# Patient Record
Sex: MALE | Race: WHITE
[De-identification: names, ages, dates, MRNs, and addresses within clinical notes are randomized per-mention and may not be internally consistent; named-entity substitution may affect disease eponyms.]

---

## 2019-02-05 ENCOUNTER — HOSPITAL ENCOUNTER (EMERGENCY)
Dept: HOSPITAL 53 - M ED | Age: 29
Discharge: HOME | End: 2019-02-05
Payer: COMMERCIAL

## 2019-02-05 VITALS — DIASTOLIC BLOOD PRESSURE: 89 MMHG | SYSTOLIC BLOOD PRESSURE: 137 MMHG

## 2019-02-05 VITALS — WEIGHT: 290.61 LBS | BODY MASS INDEX: 44.04 KG/M2 | HEIGHT: 68 IN

## 2019-02-05 DIAGNOSIS — R10.13: ICD-10-CM

## 2019-02-05 DIAGNOSIS — K29.70: Primary | ICD-10-CM

## 2019-02-05 LAB
ALBUMIN SERPL BCG-MCNC: 4 GM/DL (ref 3.2–5.2)
ALT SERPL W P-5'-P-CCNC: 23 U/L (ref 12–78)
BASOPHILS # BLD AUTO: 0.1 10^3/UL (ref 0–0.2)
BASOPHILS NFR BLD AUTO: 0.6 % (ref 0–1)
BILIRUB CONJ SERPL-MCNC: < 0.1 MG/DL (ref 0–0.2)
BILIRUB SERPL-MCNC: 0.2 MG/DL (ref 0.2–1)
BUN SERPL-MCNC: 9 MG/DL (ref 7–18)
CALCIUM SERPL-MCNC: 8.5 MG/DL (ref 8.5–10.1)
CHLORIDE SERPL-SCNC: 103 MEQ/L (ref 98–107)
CO2 SERPL-SCNC: 29 MEQ/L (ref 21–32)
CREAT SERPL-MCNC: 0.7 MG/DL (ref 0.7–1.3)
EOSINOPHIL # BLD AUTO: 0.7 10^3/UL (ref 0–0.5)
EOSINOPHIL NFR BLD AUTO: 8.2 % (ref 0–3)
GFR SERPL CREATININE-BSD FRML MDRD: > 60 ML/MIN/{1.73_M2} (ref 60–?)
GLUCOSE SERPL-MCNC: 87 MG/DL (ref 70–100)
HCT VFR BLD AUTO: 42.5 % (ref 42–52)
HGB BLD-MCNC: 14.3 G/DL (ref 13.5–17.5)
LIPASE SERPL-CCNC: 146 U/L (ref 73–393)
LYMPHOCYTES # BLD AUTO: 2.9 10^3/UL (ref 1.5–6.5)
LYMPHOCYTES NFR BLD AUTO: 34.4 % (ref 24–44)
MCH RBC QN AUTO: 28.7 PG (ref 27–33)
MCHC RBC AUTO-ENTMCNC: 33.6 G/DL (ref 32–36.5)
MCV RBC AUTO: 85.2 FL (ref 80–96)
MONOCYTES # BLD AUTO: 0.5 10^3/UL (ref 0–0.8)
MONOCYTES NFR BLD AUTO: 6.4 % (ref 0–5)
NEUTROPHILS # BLD AUTO: 4.2 10^3/UL (ref 1.8–7.7)
NEUTROPHILS NFR BLD AUTO: 50.3 % (ref 36–66)
PLATELET # BLD AUTO: 281 10^3/UL (ref 150–450)
POTASSIUM SERPL-SCNC: 4 MEQ/L (ref 3.5–5.1)
PROT SERPL-MCNC: 7.4 GM/DL (ref 6.4–8.2)
RBC # BLD AUTO: 4.99 10^6/UL (ref 4.3–6.1)
SODIUM SERPL-SCNC: 138 MEQ/L (ref 136–145)
WBC # BLD AUTO: 8.3 10^3/UL (ref 4–10)

## 2019-02-05 PROCEDURE — 96374 THER/PROPH/DIAG INJ IV PUSH: CPT

## 2019-02-05 PROCEDURE — 80048 BASIC METABOLIC PNL TOTAL CA: CPT

## 2019-02-05 PROCEDURE — 85025 COMPLETE CBC W/AUTO DIFF WBC: CPT

## 2019-02-05 PROCEDURE — 80076 HEPATIC FUNCTION PANEL: CPT

## 2019-02-05 PROCEDURE — 76705 ECHO EXAM OF ABDOMEN: CPT

## 2019-02-05 PROCEDURE — 83690 ASSAY OF LIPASE: CPT

## 2019-02-05 PROCEDURE — 99284 EMERGENCY DEPT VISIT MOD MDM: CPT

## 2019-02-05 NOTE — REPVR
EXAM: 

 US Abdomen Limited, Right Upper Quadrant 



EXAM DATE/TIME: 

 2/5/2019 8:15 PM 



CLINICAL HISTORY: 

 28 years old, male; Pain; Abdominal pain; Acute; Additional info: Ruq pain, 

tender 



TECHNIQUE: 

 Real-time ultrasound of the abdomen with image documentation. Examination was 

focused on the right upper quadrant. 



COMPARISON: 

 No relevant prior studies available. 



FINDINGS: 

 Liver: Normal. No masses. 

 Gallbladder: Normal. No gallstones. There is no gallbladder wall thickening. 

 Common bile duct:  Common bile duct measures 4.1 mm. 

 Pancreas:  Pancreatic body and tail obscured by overlying bowel gas. Remainder 

the pancreas unremarkable. 

 Right kidney:  Right kidney measures 11 x 5.8 x 4.9 cm. 



IMPRESSION: 

No acute findings.



Electronically signed by: Marciano Caruso On 02/05/2019  20:35:13 PM

## 2019-02-07 ENCOUNTER — HOSPITAL ENCOUNTER (OUTPATIENT)
Dept: HOSPITAL 53 - M LAB REF | Age: 29
End: 2019-02-07
Attending: NURSE PRACTITIONER
Payer: COMMERCIAL

## 2019-02-07 DIAGNOSIS — R31.9: Primary | ICD-10-CM

## 2019-02-07 LAB
APPEARANCE UR: CLEAR
BACTERIA UR QL AUTO: NEGATIVE
BILIRUB UR QL STRIP.AUTO: NEGATIVE
GLUCOSE UR QL STRIP.AUTO: NEGATIVE MG/DL
HGB UR QL STRIP.AUTO: NEGATIVE
KETONES UR QL STRIP.AUTO: NEGATIVE MG/DL
LEUKOCYTE ESTERASE UR QL STRIP.AUTO: NEGATIVE
MUCOUS THREADS URNS QL MICRO: (no result)
NITRITE UR QL STRIP.AUTO: NEGATIVE
PH UR STRIP.AUTO: 8 UNITS (ref 5–9)
PROT UR QL STRIP.AUTO: (no result) MG/DL
RBC # UR AUTO: 4 /HPF (ref 0–3)
SP GR UR STRIP.AUTO: 1.02 (ref 1–1.03)
SQUAMOUS #/AREA URNS AUTO: 0 /HPF (ref 0–6)
UROBILINOGEN UR QL STRIP.AUTO: 0.2 MG/DL (ref 0–2)
WBC #/AREA URNS AUTO: 1 /HPF (ref 0–3)

## 2019-02-22 ENCOUNTER — HOSPITAL ENCOUNTER (OUTPATIENT)
Dept: HOSPITAL 53 - M LAB REF | Age: 29
End: 2019-02-22
Attending: NURSE PRACTITIONER
Payer: COMMERCIAL

## 2019-02-22 DIAGNOSIS — R31.9: Primary | ICD-10-CM

## 2019-02-22 LAB
APPEARANCE UR: CLEAR
BACTERIA UR QL AUTO: NEGATIVE
BILIRUB UR QL STRIP.AUTO: NEGATIVE
GLUCOSE UR QL STRIP.AUTO: NEGATIVE MG/DL
HGB UR QL STRIP.AUTO: NEGATIVE
KETONES UR QL STRIP.AUTO: NEGATIVE MG/DL
LEUKOCYTE ESTERASE UR QL STRIP.AUTO: NEGATIVE
MUCOUS THREADS URNS QL MICRO: (no result)
NITRITE UR QL STRIP.AUTO: NEGATIVE
PH UR STRIP.AUTO: 6 UNITS (ref 5–9)
PROT UR QL STRIP.AUTO: NEGATIVE MG/DL
RBC # UR AUTO: 4 /HPF (ref 0–3)
SP GR UR STRIP.AUTO: 1.03 (ref 1–1.03)
SQUAMOUS #/AREA URNS AUTO: 0 /HPF (ref 0–6)
UROBILINOGEN UR QL STRIP.AUTO: 2 MG/DL (ref 0–2)
WBC #/AREA URNS AUTO: 2 /HPF (ref 0–3)

## 2019-11-27 ENCOUNTER — HOSPITAL ENCOUNTER (OUTPATIENT)
Dept: HOSPITAL 53 - M LAB REF | Age: 29
End: 2019-11-27
Attending: PHYSICIAN ASSISTANT
Payer: COMMERCIAL

## 2019-11-27 DIAGNOSIS — R50.9: Primary | ICD-10-CM

## 2020-03-30 ENCOUNTER — HOSPITAL ENCOUNTER (EMERGENCY)
Dept: HOSPITAL 53 - M ED | Age: 30
Discharge: HOME | End: 2020-03-30
Payer: COMMERCIAL

## 2020-03-30 VITALS — SYSTOLIC BLOOD PRESSURE: 132 MMHG | DIASTOLIC BLOOD PRESSURE: 67 MMHG

## 2020-03-30 VITALS — HEIGHT: 68 IN | WEIGHT: 300.27 LBS | BODY MASS INDEX: 45.51 KG/M2

## 2020-03-30 DIAGNOSIS — R39.198: Primary | ICD-10-CM

## 2020-03-30 DIAGNOSIS — K21.9: ICD-10-CM

## 2020-03-30 DIAGNOSIS — Z88.0: ICD-10-CM

## 2020-03-30 DIAGNOSIS — Z79.899: ICD-10-CM

## 2020-03-30 DIAGNOSIS — J45.909: ICD-10-CM

## 2020-03-30 DIAGNOSIS — E66.9: ICD-10-CM

## 2020-03-30 LAB
ALBUMIN SERPL BCG-MCNC: 4 GM/DL (ref 3.2–5.2)
ALT SERPL W P-5'-P-CCNC: 29 U/L (ref 12–78)
BASOPHILS # BLD AUTO: 0 10^3/UL (ref 0–0.2)
BASOPHILS NFR BLD AUTO: 0.5 % (ref 0–1)
BILIRUB CONJ SERPL-MCNC: 0.1 MG/DL (ref 0–0.2)
BILIRUB SERPL-MCNC: 0.3 MG/DL (ref 0.2–1)
EOSINOPHIL # BLD AUTO: 0.1 10^3/UL (ref 0–0.5)
EOSINOPHIL NFR BLD AUTO: 1.7 % (ref 0–3)
HCT VFR BLD AUTO: 43.8 % (ref 42–52)
HGB BLD-MCNC: 14.8 G/DL (ref 13.5–17.5)
LIPASE SERPL-CCNC: 135 U/L (ref 73–393)
LYMPHOCYTES # BLD AUTO: 2.6 10^3/UL (ref 1.5–5)
LYMPHOCYTES NFR BLD AUTO: 32.7 % (ref 24–44)
MCH RBC QN AUTO: 29 PG (ref 27–33)
MCHC RBC AUTO-ENTMCNC: 33.8 G/DL (ref 32–36.5)
MCV RBC AUTO: 85.9 FL (ref 80–96)
MONOCYTES # BLD AUTO: 0.6 10^3/UL (ref 0–0.8)
MONOCYTES NFR BLD AUTO: 7.3 % (ref 0–5)
NEUTROPHILS # BLD AUTO: 4.5 10^3/UL (ref 1.5–8.5)
NEUTROPHILS NFR BLD AUTO: 57.5 % (ref 36–66)
PLATELET # BLD AUTO: 309 10^3/UL (ref 150–450)
PROT SERPL-MCNC: 8 GM/DL (ref 6.4–8.2)
RBC # BLD AUTO: 5.1 10^6/UL (ref 4.3–6.1)
WBC # BLD AUTO: 7.8 10^3/UL (ref 4–10)

## 2020-03-30 PROCEDURE — 80076 HEPATIC FUNCTION PANEL: CPT

## 2020-03-30 PROCEDURE — 83690 ASSAY OF LIPASE: CPT

## 2020-03-30 PROCEDURE — 80047 BASIC METABLC PNL IONIZED CA: CPT

## 2020-03-30 PROCEDURE — 81001 URINALYSIS AUTO W/SCOPE: CPT

## 2020-03-30 PROCEDURE — 99284 EMERGENCY DEPT VISIT MOD MDM: CPT

## 2020-03-30 PROCEDURE — 96360 HYDRATION IV INFUSION INIT: CPT

## 2020-03-30 PROCEDURE — 85025 COMPLETE CBC W/AUTO DIFF WBC: CPT

## 2020-03-30 PROCEDURE — 74177 CT ABD & PELVIS W/CONTRAST: CPT

## 2020-03-30 NOTE — REP
CT ABDOMEN AND PELVIS WITH IV CONTRAST:

 

TECHNIQUE:  Axial contrast enhanced images from the lung bases to the pubic

symphysis using 100 mL Isovue 370 intravenous contrast material with multiplanar

reformations.

 

 

 

Visualized lung bases are clear. The liver, spleen, adrenals, pancreas and

kidneys are unremarkable. No hydronephrosis is seen bilaterally. There is no

abdominal aortic aneurysm. I see no adenopathy. There is no free air or free

fluid. No bowel wall thickening is seen. The appendix is normal. No anterior

abdominal wall defect is seen. No pelvic mass is seen. Urinary bladder is

minimally distended and grossly unremarkable.

 

IMPRESSION:

 

Essentially negative CT abdomen and pelvis, with no acute abnormalities

identified.

 

 

Electronically Signed by

Derrick Barrow MD 03/31/2020 10:28 A

## 2020-04-27 ENCOUNTER — HOSPITAL ENCOUNTER (OUTPATIENT)
Dept: HOSPITAL 53 - M SLEEP | Age: 30
End: 2020-04-27
Attending: NURSE PRACTITIONER
Payer: COMMERCIAL

## 2020-04-27 DIAGNOSIS — G47.33: Primary | ICD-10-CM

## 2020-05-13 ENCOUNTER — HOSPITAL ENCOUNTER (OUTPATIENT)
Dept: HOSPITAL 53 - M SLEEP | Age: 30
End: 2020-05-13
Attending: NURSE PRACTITIONER
Payer: COMMERCIAL

## 2020-05-13 DIAGNOSIS — G47.33: Primary | ICD-10-CM

## 2020-05-19 NOTE — SLEEPCENT
DATE OF STUDY:  05/13/2020

 

ORDERED BY:  Mini De Jesus

 

Nocturnal polysomnography was performed for the titration of pressure therapy in

this patient with obstructive sleep apnea syndrome and apnea-hypopnea index of

8.8.

 

For testing, a [employer] View full face mask of medium size was used, 4

cm of water pressure were applied to the circuit and the lights were

extinguished.

 

7 hours and 27 minutes of data were reviewed.  There were 404 minutes of sleep

identified.  Sleep latency was normal at 17.5 minutes.  Rapid eye movement (REM)

latency was normal at 86 minute.  Sleep architecture was good with 4 REM cycles.

Overall sleep efficiency was 91.3%.  The patient's electrocardiogram showed a

sinus rhythm with an average heart rate of 56 beats per minute.

Electroencephalogram (EEG) showed normal waveforms for awake and sleep.

Respiratory events were fully palliated with C-PAP at a  pressure 10 and

remaining measures of sleep physiology were normal.

 

IMPRESSION:  Obstructive sleep apnea syndrome (G47.33).

 

RECOMMENDATION:  Nightly use of pressure therapy at 10 cm of water.

## 2021-02-11 ENCOUNTER — HOSPITAL ENCOUNTER (EMERGENCY)
Dept: HOSPITAL 53 - M ED | Age: 31
Discharge: HOME | End: 2021-02-11
Payer: COMMERCIAL

## 2021-02-11 VITALS — BODY MASS INDEX: 44.82 KG/M2 | HEIGHT: 70 IN | WEIGHT: 313.06 LBS

## 2021-02-11 VITALS — SYSTOLIC BLOOD PRESSURE: 127 MMHG | DIASTOLIC BLOOD PRESSURE: 72 MMHG

## 2021-02-11 DIAGNOSIS — J45.909: ICD-10-CM
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
## 2021-02-11 DIAGNOSIS — S61.216A: Primary | ICD-10-CM
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
## 2021-02-11 DIAGNOSIS — Z88.0: ICD-10-CM
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
## 2021-02-11 DIAGNOSIS — K21.9: ICD-10-CM

## 2021-02-11 NOTE — CCD
Summarization Of Episode

                             Created on: 2021



ADAM HERNANDEZ

External Reference #: [id number]

: 1990

Sex: Male



Demographics





                          Address                   [address]

[place]  [address]

 

                          Home Phone                [phone]

 

                          Preferred Language        English

 

                          Marital Status            

 

                          Faith Affiliation     NONE

 

                          Race                      White

 

                          Ethnic Group              Not  or 





Author





                          Author                    [facility]

 

                          Organization              [facility]

 

                          Address                   Unknown

 

                          Phone                     Unavailable







Support





                Name            Relationship    Address         Phone

 

                    ROJASLELO NGUYEN Next Of Kin         [address]

[place]  [address]                    [phone]

 

                    BOLIVAR GARZA Next Of Kin         [address]

[place] [address]                    [phone]

 

                    JOYA HERNANDEZ   Next Of Kin         [address] [place] [address]                    [phone]

 

                ST              Next Of Kin     Unknown         Unavailable

 

                    CRYSTAL FISHER Next Of Kin         [address] [place] [address]                    [phone]

 

                    JOYA COY  Next Of Kin         [address] [place] [address]                    [phone]

 

                    TAWANNA HERNANDEZ    Next Of Kin         [address]

[place] [address]                    [phone]

 

                Aracelis Hernandez ECON            Unknown         Unavailable







Care Team Providers





                    Care Team Member Name Role                Phone

 

                    Elba Marie MD (Jack) Unavailable         Unavailable

 

                    Elba Marie MD (Jack) Unavailable         Unavailable

 

                    Elba Marie MD (Jack) Unavailable         Unavailable

 

                    Elba Marie MD (Jack) Unavailable         Unavailable

 

                    Elba Marie MD (Jack) Unavailable         Unavailable

 

                    Elba Marie MD (Jack) Unavailable         Unavailable

 

                    Elba Marie MD (Jack) Unavailable         Unavailable

 

                    Elba Marie MD (Jack) Unavailable         Unavailable

 

                    Elba Marie MD (Jack) Unavailable         Unavailable

 

                    Elba Marie MD (Jack) Unavailable         Unavailable

 

                    Elba Marie MD (Jack) Unavailable         Unavailable

 

                    Elba Marie MD (Jack) Unavailable         Unavailable

 

                    Elba Marie MD (Jack) Unavailable         Unavailable

 

                    Elba Marie MD (Jack) Unavailable         Unavailable

 

                    Elba Marie MD (Jack) Unavailable         Unavailable

 

                    Tin,  Elba Sonya (Elton) MD Unavailable         Unavailable

 

                    Tin,  Elba Sonya (Elton) MD Unavailable         Unavailable

 

                    Tin,  Elba Sonya (Elton) MD Unavailable         Unavailable

 

                    Tin,  Elba Sonya (Elton) MD Unavailable         Unavailable

 

                    Tin,  Elba Sonya (Elton) MD Unavailable         Unavailable

 

                    Tin,  Elba Sonya (Elton) MD Unavailable         Unavailable

 

                    Tin,  Elba Sonya (Elton) MD Unavailable         Unavailable

 

                    Tin,  Elba Sonya (Elton) MD Unavailable         Unavailable

 

                    Tin,  Elba Sonya (Elton) MD Unavailable         Unavailable

 

                    Tin,  Elba Sonya (Elton) MD Unavailable         Unavailable

 

                    Tin,  Elba Sonya (Elton) MD Unavailable         Unavailable

 

                    Tin,  Elba Sonya (Elton) MD Unavailable         Unavailable

 

                    Tin,  Elba Sonya (Elton) MD Unavailable         Unavailable

 

                    Tin,  Elba Sonya (Elton) MD Unavailable         Unavailable

 

                    Tin,  Elba Sonya (Elton) MD Unavailable         Unavailable

 

                    Tin,  Elba Sonya (Elton) MD Unavailable         Unavailable

 

                    Tin,  Elba Sonya (Elton) MD Unavailable         Unavailable

 

                    Tin,  Elba Sonya (Elton) MD Unavailable         Unavailable

 

                    Tin,  Elba Sonya (Elton) MD Unavailable         Unavailable

 

                    Tin,  Elba Sonya (Elton) MD Unavailable         Unavailable

 

                    Tin,  Elba Sonya (Elton) MD Unavailable         Unavailable

 

                    Tin,  Elba Sonya (Elton) MD Unavailable         Unavailable

 

                    Tin,  Elba Sonya (Elton) MD Unavailable         Unavailable

 

                    Tin,  Elba Sonya (Elton) MD Unavailable         Unavailable

 

                    Tin,  Elba Sonya (Elton) MD Unavailable         Unavailable

 

                    Tin,  Elba Sonya (Elton) MD Unavailable         Unavailable

 

                    Tin,  Elba Sonya (Elton) MD Unavailable         Unavailable

 

                    Tin,  Elba Sonya (Elton) MD Unavailable         Unavailable

 

                    Tin,  Elba Sonya (Elton) MD Unavailable         Unavailable

 

                    Tin,  Elba Sonya (Elton) MD Unavailable         Unavailable

 

                    Tin,  Elba Sonya (Elton) MD Unavailable         Unavailable

 

                    Tin,  Elba Sonya (Elton) MD Unavailable         Unavailable

 

                    Tin,  Elba Sonya (Elton) MD Unavailable         Unavailable

 

                    Tin,  Elba Sonya (Elton) MD Unavailable         Unavailable

 

                    Tin,  Elba Sonya (Elton) MD Unavailable         Unavailable

 

                    Tin,  Elba Sonya (Elton) MD Unavailable         Unavailable

 

                    Tin,  Elba Sonya (Elton) MD Unavailable         Unavailable

 

                    Tin,  Elba Sonya (Elton) MD Unavailable         Unavailable

 

                    Tin,  Elba Sonya (Elton) MD Unavailable         Unavailable

 

                    CONCETTA MINA JR, PA-C Unavailable         Unavailable

 

                    CONCETTA MINA JR, PA-C Unavailable         Unavailable

 

                    CONCETTA MINA JRC Unavailable         Unavailable

 

                    PICKERAL JR, J ODILIA PA-C Unavailable         Unavailable

 

                    PICKERAL JR, J ODILIA PA-C Unavailable         Unavailable

 

                    PICKERAL JR, J ODILIA PA-C Unavailable         Unavailable

 

                    PICKERAL JR, J ODILIA PA-C Unavailable         Unavailable

 

                    PICKERAL JR, J ODILIA PA-C Unavailable         Unavailable

 

                    PICKERAL JR, J ODILIA PA-C Unavailable         Unavailable

 

                    PICKERAL JR, J ODILIA PA-C Unavailable         Unavailable

 

                    PICKERAL JR, J ODILIA PA-C Unavailable         Unavailable

 

                    PICKERAL JR, J ODILIA PA-C Unavailable         Unavailable

 

                    PICKERAL JR, J ODILIA PA-C Unavailable         Unavailable

 

                    PICKERAL JR, J ODILIA PA-C Unavailable         Unavailable

 

                    PICKERAL JR, J ODILIA PA-C Unavailable         Unavailable

 

                    PICKERAL JR, J ODILIA PA-C Unavailable         Unavailable

 

                    PICKERAL JR, J ODILIA PA-C Unavailable         Unavailable

 

                    PICKERAL JR, J ODILIA PA-C Unavailable         Unavailable

 

                    PICKERAL JR, J ODILIA PA-C Unavailable         Unavailable

 

                    PICKERAL JR, J ODILIA PA-C Unavailable         Unavailable

 

                    PICKERAL JR, J ODILIA PA-C Unavailable         Unavailable

 

                    Eusebio,  Serene FNP Unavailable         Unavailable

 

                    Eusebio,  Serene FNP Unavailable         Unavailable

 

                    Eusebio,  Serene FNP Unavailable         Unavailable

 

                    Eusebio,  Serene FNP Unavailable         Unavailable

 

                    Eusebio,  Serene FNP Unavailable         Unavailable

 

                    Eusebio,  Serene FNP Unavailable         Unavailable

 

                    Eusebio,  Serene FNP Unavailable         Unavailable

 

                    Eusebio,  Serene FNP Unavailable         Unavailable

 

                    Eusebio,  Serene FNP Unavailable         Unavailable

 

                    Eusebio,  Serene FNP Unavailable         Unavailable

 

                    Eusebio,  Serene FNP Unavailable         Unavailable

 

                    Eusebio,  Serene FNP Unavailable         Unavailable

 

                    Eusebio,  Serene FNP Unavailable         Unavailable

 

                    Eusebio,  Serene FNP Unavailable         Unavailable

 

                    Eusebio,  Serene FNP Unavailable         Unavailable

 

                    Eusebio,  Serene FNP Unavailable         Unavailable

 

                    Eusebio,  Serene FNP Unavailable         Unavailable

 

                    Eusebio,  Serene FNP Unavailable         Unavailable

 

                    Eusebio,  Serene FNP Unavailable         Unavailable

 

                    Eusebio,  Serene FNP Unavailable         Unavailable

 

                    Eusebio,  Serene FNP Unavailable         Unavailable

 

                    Eusebio,  Serene FNP Unavailable         Unavailable

 

                    Eusebio,  Serene FNP Unavailable         Unavailable

 

                    Eusebio,  Serene FNP Unavailable         Unavailable

 

                    Eusebio,  Serene FNP Unavailable         Unavailable

 

                    Eusebio,  Serene FNP Unavailable         Unavailable

 

                    Eusebio,  Serene FNP Unavailable         Unavailable

 

                    Eusebio,  Serene FNP Unavailable         Unavailable

 

                    Eusebio,  Serene FNP Unavailable         Unavailable

 

                    Eusebio,  Serene FNP Unavailable         Unavailable

 

                    Eusebio,  Serene FNP Unavailable         Unavailable

 

                    Eusebio,  Serene FNP Unavailable         Unavailable

 

                    Eusebio,  Serene FNP Unavailable         Unavailable

 

                    Eusebio,  Serene FNP Unavailable         Unavailable

 

                    Eusebio,  Serene FNP Unavailable         Unavailable

 

                    Eusebio,  Serene FNP Unavailable         Unavailable

 

                    Eusebio,  Serene FNP Unavailable         Unavailable

 

                    Eusebio,  Serene FNP Unavailable         Unavailable

 

                    Eusebio,  Serene FNP Unavailable         Unavailable

 

                    Eusebio,  Serene FNP Unavailable         Unavailable

 

                    Eusebio,  Serene FNP Unavailable         Unavailable

 

                    Eusebio,  Serene FNP Unavailable         Unavailable

 

                    Eusebio,  Serene FNP Unavailable         Unavailable

 

                    Eusebio,  Serene FNP Unavailable         Unavailable

 

                    Eusebio,  Serene FNP Unavailable         Unavailable

 

                    Eusebio,  Serene FNP Unavailable         Unavailable

 

                    Eusebio,  Serene FNP Unavailable         Unavailable

 

                    Eusebio,  Serene FNP Unavailable         Unavailable

 

                    Eusebio,  Serene FNP Unavailable         Unavailable

 

                    Eusebio,  Serene FNP Unavailable         Unavailable

 

                    Eusebio,  Serene FNP Unavailable         Unavailable

 

                    Eusebio,  Serene FNP Unavailable         Unavailable

 

                    Eusebio,  Serene FNP Unavailable         Unavailable

 

                    Eusebio,  Serene FNP Unavailable         Unavailable

 

                    Eusebio,  Serene FNP Unavailable         Unavailable

 

                    Eusebio,  Serene FNP Unavailable         Unavailable

 

                    Eusebio,  Serene FNP Unavailable         Unavailable

 

                    Eusebio,  Serene FNP Unavailable         Unavailable

 

                    Eusebio,  Serene FNP Unavailable         Unavailable

 

                    Eusebio,  Serene FNP Unavailable         Unavailable

 

                    Eusebio,  Serene FNP Unavailable         Unavailable

 

                    Eusebio,  Serene FNP Unavailable         Unavailable

 

                    Eusebio,  Serene FNP Unavailable         Unavailable

 

                    Eusebio,  Serene FNP Unavailable         Unavailable

 

                    Eusebio,  Serene FNP Unavailable         Unavailable

 

                    Eusebio,  Serene FNP Unavailable         Unavailable

 

                    Eusebio,  Serene FNP Unavailable         Unavailable

 

                    Eusebio,  Serene FNP Unavailable         Unavailable

 

                    Eusebio,  Serene FNP Unavailable         Unavailable

 

                    Eusebio,  Seerne FNP Unavailable         Unavailable

 

                    Eusebio,  Sereen FNP Unavailable         Unavailable

 

                    Eusebio,  Serene FNP Unavailable         Unavailable

 

                    Eusebio,  Serene FNP Unavailable         Unavailable

 

                    Eusebio,  Serene FNP Unavailable         Unavailable

 

                    Eusebio,  Serene FNP Unavailable         Unavailable

 

                    Eusebio,  Serene FNP Unavailable         Unavailable

 

                    Eusebio,  Serene FNP Unavailable         Unavailable

 

                    Eusebio,  Serene FNP Unavailable         Unavailable

 

                    Eusebio,  Serene FNP Unavailable         Unavailable

 

                    Eusebio,  Serene FNP Unavailable         Unavailable

 

                    Eusebio,  Serene FNP Unavailable         Unavailable

 

                    Eusebio,  Serene FNP Unavailable         Unavailable

 

                    Eusebio,  Serene FNP Unavailable         Unavailable

 

                    Eusebio,  Serene FNP Unavailable         Unavailable

 

                    Eusebio,  Serene FNP Unavailable         Unavailable

 

                    Eusebio,  Serene FNP Unavailable         Unavailable

 

                    Eusebio,  Serene FNP Unavailable         Unavailable

 

                    Eusebio,  Serene FNP Unavailable         Unavailable

 

                    Eusebio,  Serene FNP Unavailable         Unavailable

 

                    Eusebio,  Serene FNP Unavailable         Unavailable

 

                    Eusebio,  Serene FNP Unavailable         Unavailable

 

                    Eusebio,  Serene FNP Unavailable         Unavailable

 

                    Eusebio,  Serene FNP Unavailable         Unavailable

 

                    Eusebio,  Serene FNP Unavailable         Unavailable

 

                    Eusebio,  Serene FNP Unavailable         Unavailable

 

                    Eusebio,  Serene FNP Unavailable         Unavailable

 

                    Eusebio,  Serene FNP Unavailable         Unavailable

 

                    Eusebio,  Serene FNP Unavailable         Unavailable

 

                    Eusebio,  Serene FNP Unavailable         Unavailable

 

                    Eusebio,  Serene FNP Unavailable         Unavailable

 

                    Eusebio,  Serene FNP Unavailable         Unavailable

 

                    Eusebio,  Serene FNP Unavailable         Unavailable

 

                    Eusebio,  Serene FNP Unavailable         Unavailable

 

                    Eusebio,  Serene FNP Unavailable         Unavailable

 

                    Eusebio,  Serene FNP Unavailable         Unavailable

 

                    Eusebio,  Serene FNP Unavailable         Unavailable

 

                    Eusebio,  Serene FNP Unavailable         Unavailable

 

                    Eusebio,  Serene FNP Unavailable         Unavailable

 

                    Eusebio,  Serene FNP Unavailable         Unavailable

 

                    Eusebio,  Serene FNP Unavailable         Unavailable

 

                    Eusebio,  Serene FNP Unavailable         Unavailable

 

                    Eusebio,  Serene FNP Unavailable         Unavailable

 

                    Eusebio,  Serene FNP Unavailable         Unavailable

 

                    Eusebio,  Serene FNP Unavailable         Unavailable

 

                    Eusebio,  Serene FNP Unavailable         Unavailable

 

                    Eusebio,  Serene FNP Unavailable         Unavailable

 

                    Eusebio,  Serene FNP Unavailable         Unavailable

 

                    Eusebio,  Serene FNP Unavailable         Unavailable

 

                    Eusebio,  Serene FNP Unavailable         Unavailable

 

                    Eusebio,  Serene FNP Unavailable         Unavailable

 

                    Eusebio,  Serene FNP Unavailable         Unavailable

 

                    Eusebio,  Serene FNP Unavailable         Unavailable

 

                    Eusebio,  Serene FNP Unavailable         Unavailable

 

                    Eusebio,  Serene FNP Unavailable         Unavailable

 

                    Eusebio,  Serene FNP Unavailable         Unavailable

 

                    Eusebio,  Serene FNP Unavailable         Unavailable

 

                    Eusebio,  Serene FNP Unavailable         Unavailable

 

                    Eusebio,  Serene FNP Unavailable         Unavailable

 

                    Eusebio,  Serene FNP Unavailable         Unavailable

 

                    Eusebio,  Serene FNP Unavailable         Unavailable

 

                    Eusebio,  Serene FNP Unavailable         Unavailable

 

                    Eusebio,  Serene FNP Unavailable         Unavailable

 

                    Eusebio,  Serene FNP Unavailable         Unavailable

 

                    Eusebio,  Serene FNP Unavailable         Unavailable

 

                    Eusebio,  Serene FNP Unavailable         Unavailable

 

                    Eusebio,  Serene FNP Unavailable         Unavailable

 

                    Eusebio,  Serene FNP Unavailable         Unavailable

 

                    Eusebio,  Serene FNP Unavailable         Unavailable

 

                    Eusebio,  Serene FNP Unavailable         Unavailable

 

                    Eusebio,  Serene FNP Unavailable         Unavailable

 

                    Eusebio,  Serene FNP Unavailable         Unavailable

 

                    Eusebio,  Esrene FNP Unavailable         Unavailable

 

                    Eusebio,  Serene FNP Unavailable         Unavailable

 

                    Eusebio,  Serene FNP Unavailable         Unavailable

 

                    Eusebio,  Serene FNP Unavailable         Unavailable

 

                    Eusebio,  Serene FNP Unavailable         Unavailable

 

                    Eusebio,  Serene FNP Unavailable         Unavailable

 

                    Eusebio,  Serene FNP Unavailable         Unavailable

 

                    Eusebio,  Serene FNP Unavailable         Unavailable

 

                    Eusebio,  Serene FNP Unavailable         Unavailable

 

                    Eusebio,  Serene FNP Unavailable         Unavailable

 

                    Eusebio,  Serene FNP Unavailable         Unavailable

 

                    Eusebio,  Serene FNP Unavailable         Unavailable

 

                    Eusebio,  Serene FNP Unavailable         Unavailable

 

                    Eusebio,  Serene FNP Unavailable         Unavailable

 

                    Eusebio,  Serene FNP Unavailable         Unavailable

 

                    Eusebio,  Serene FNP Unavailable         Unavailable

 

                    Eusebio,  Serene FNP Unavailable         Unavailable

 

                    Eusebio,  Serene FNP Unavailable         Unavailable



                                  



Re-disclosure Warning

          The records that you are about to access may contain information from 
federally-assisted alcohol or drug abuse programs. If such information is 
present, then the following federally mandated warning applies: This information
has been disclosed to you from records protected by federal confidentiality 
rules (42 CFR part 2). The federal rules prohibit you from making any further 
disclosure of this information unless further disclosure is expressly permitted 
by the written consent of the person to whom it pertains or as otherwise 
permitted by 42 CFR part 2. A general authorization for the release of medical 
or other information is NOT sufficient for this purpose. The Federal rules 
restrict any use of the information to criminally investigate or prosecute any 
alcohol or drug abuse patient.The records that you are about to access may 
contain highly sensitive health information, the redisclosure of which is 
protected by Article 27-F of the [facility] Public Health law. If you 
continue you may have access to information: Regarding HIV / AIDS; Provided by 
facilities licensed or operated by the [facility] Office of Mental Health; 
or Provided by the [facility] Office for People With Developmental 
Disabilities. If such information is present, then the following [place] 
mandated warning applies: This information has been disclosed to you from 
confidential records which are protected by state law. State law prohibits you 
from making any further disclosure of this information without the specific 
written consent of the person to whom it pertains, or as otherwise permitted by 
law. Any unauthorized further disclosure in violation of state law may result in
a fine or nursing home sentence or both. A general authorization for the release of 
medical or other information is NOT sufficient authorization for further disc
losure.                                                                         
    



Family History

          



             Family Member Name Family Member Gender Family Member Status Date o

f Status 

Description                             Data Source(s)

 

           Unknown    Unknown    Problem                          [facility]



                                                                                
       



Encounters

          



           Encounter  Providers  Location   Date       Indications Data Source(s

)

 

                                Attender: Elba Marie (Jack) MDReferrer: Ant [facility]                 2020 

08:20:12 PM EST                                     [facility]

 

                                Attender: Elba Marie (Jack) MDReferrer: Ant [facility]                 2020 

08:20:12 PM EST                                     [facility]

 

                                Attender: Elba Marie (Jack) MDReferrer: Ant [facility]                 2020 

08:20:12 PM EST                                     [facility]

 

                                Attender: Elba Marie (Jack) MDReferrer: Ant [facility]                 2020 

08:20:12 PM EST                                     [facility]

[facility]

 

                                Attender: Elba Marie (Jack) MDReferrer: Ant [facility]                 2020 

08:20:12 PM EST                                     [facility]

[facility]

 

                                Attender: Elba Marie (Jack) MDReferrer: Ant [facility]                 2020 

08:20:12 PM EST                                     [facility]

[facility]

 

                                Attender: Elba Marie (Jack) MDReferrer: Ant [facility]                 2020 

08:20:12 PM EST                                     [facility]

[facility]

 

                Outpatient      Attender: ODILIA Garciasg Gabriella 1

 08:00:00 AM

EDT                                                 MED[facility] ([facility]

)

 

                                Attender: Elba Marie (Jack) MDReferrer: Ant [facility]                 2020 

08:20:09 PM EDT                                     Gastroenterology [facility] Hepatol

ogy of [facility]

 

                                Attender: Elba Marie (Jack) MDReferrer: Ant [facility]                 2020 

08:20:09 PM EDT                                     Gastroenterology [facility] Hepatol

ogy of [facility]

 

                                Attender: Elba Ray) Frank MDReferrer: Ant [facility]                 2020 

08:20:09 PM EDT                                     Gastroenterology and Hepatol

ogy [facility]

 

           Outpatient Referrer: Serene [facility]            2020 05:02:0

0 AM EDT            [facility]

 

                Outpatient      Attender: Serene [facility] Joce Quiroz 0

2020 11:15:00 AM

EDT                                                 [facility] ([facility]

)



                                                                                
                                                                                
                                              



Medications

          



          Medication Brand Name Start Date Product Form Dose      Route     Admi

nistrative 

Instructions Pharmacy Instructions Status     Indications Reaction   Description

 Data 

Source(s)

 

          10 mg-3.5 gram -12 gram/160 mL           2020 12:00:00 AM EST so

lution  320                 AS 

DIRECTED BY [facility] AS DIRECTED BY [facility]

[facility] SOLD: 2020                                        [facility]

 

                montelukast 10 MG Oral Tablet MONTELUKAST SODIUM 2020 12:0

0:00 AM EDT 

tablet          30                              TAKE ONE TABLET BY MOUTH AT BEDT

JACKY TAKE ONE TABLET BY MOUTH AT 

BEDTIME      SOLD: 2020                                        [facility]

s

 

          500 mg              2020 12:00:00 AM EDT tablet    20           

       TAKE ONE TABLET BY MOUTH TWICE A

DAY        TAKE ONE TABLET BY MOUTH TWICE A DAY SOLD: 2020                

                  [facility]

 

          500 mg              2020 12:00:00 AM EDT tablet    30           

       TAKE ONE TABLET BY MOUTH THREE 

TIMES A DAY TAKE ONE TABLET BY MOUTH THREE TIMES A DAY SOLD: 2020         

                         

[facility]

 

          40 mg               2020 12:00:00 AM EDT capsule,delayed release

(DR/EC) 30                  TAKE ONE 

CAPSULE BY MOUTH EVERY DAY TAKE ONE CAPSULE BY MOUTH EVERY DAY SOLD: 2020 

   

                                                            [facility]

 

                    Esomeprazole 40 MG Delayed Release Oral Capsule [Nexium] Nex

ium              2020 

12:00:00 AM EDT               ORAL                 active                      M

[facility] ([facility])

 

          75 mg               2020 12:00:00 AM EST capsule   10           

       TAKE ONE CAPSULE BY MOUTH EVERY 

DAY FOR 10 DAYS           TAKE ONE CAPSULE BY MOUTH EVERY DAY FOR 10 DAYS SOLD: 

2020

                                                                [facility]

 

           Oseltamivir 75 MG Oral Capsule [Tamiflu] Tamiflu    2019 12:00:

00 AM EST                       

                              completed                               [facility] ([facility])

 

                          montelukast 10 MG Oral Tablet Singulair 10 mg oral tab

let Singulair 10 mg oral 

tablet 08/15/2019 09:06:21 AM EDT       1 tablet                   completed    

         Singulair 

[place] ([facility])



                                                                                
                                                                    



Insurance Providers

          



             Payer name   Policy type / Coverage type Policy ID    Covered party

 ID Covered 

party's relationship to cantrell Policy Cantrell             Plan Information

 

          [facility]           [id number]            WI2              

   [id number]

 

          [facility] Care Management           [id number]            1                   

[id number]

 

          [facility] COMMUNITY PLAN           [id number]           0       

            [id number]

 

          [facility] Care Management           [id number]            1                   

[id number]

 

          [facility]       O         [id number]            S                   [id number]

 

          [facility]           [id number]            SP               

   [id number]

 

          SELF PAY ONLY                               SP                   

 

          BCBS BOWEN HMO           [id number]           SP                  [id number]

[id number]

 

          Blue Cross Blue Shield P         [id number]           SELF          

      [id number]

 

          BCBS                [id number]           Blue Cross/Blue 

Shield           

[id number]

 

          Blue Cross Blue Shield P         [id number]           SELF          

      [id number]

 

          Medicare  C         [id number]           SELF                [id number]

[id number]

 

          Blue Cross Blue Shield P         [id number]           SELF             

   [id number]

 

          [employer] (Old) Medigap Part B [id number]           Self               

 [id number]

 

          [facility] Trad/MX Medigap Part B [id number]           Self       

         [id number]

 

          [facility] Community Bowen/Ess PLS Commercial [id number]           Self         

       [id number]

 

          [facility] Trad/MX Commercial [id number]           Self           

     [id number]

 

          [employer] Medigap Part B [id number]           Family Dependent 

          [id number]

 

          [employer] (Old) Medigap Part B [id number]           Self               

 [id number]

 

          [employer] Trad/MX Medigap Part B [id number]           Self       

         [id number]

 

          BCBS BOWEN O           [id number]           SP                  [id number]

[id number]

 

          [employer] (Old) Medigap Part B [id number]           Self               

 [id number]

 

          [employer] Trad/MX Medigap Part B [id number]           Self       

         [id number]

 

          [employer] BCBS B         [id number]           S                   [id number]

[id number]

 

          [employer]BS [place] [address] [address]           [id number]           SP       

           [id number]

 

          [employer] [place] WATN /307           [id number]           SP       

           [id number]

 

          [facility] COMMUNITY PLAN MCDO           [id number]           SP           

       [id number]

 

          [employer] BCBS Health Maintenance Organization (HMO) [id number]      

     Self                

[id number]

 

          [employer] (Old) Medigap Part B [id number]           Self               

 [id number]

 

          [employer] Trad/MX Medigap Part B [id number]           Self       

         [id number]

 

          [employer] Ppo Medigap Part B [id number]           Self                [id number]

[id number]

 

          [employer] Trad/MX Commercial [id number]           Self           

     [id number]

 

          [facility] Community Bowen/Ess PLS Commercial [id number]           Self         

       [id number]

 

          [employer] Ppo Medigap Part B [id number]           Self                [id number]

[id number]

 

          [employer] Trad/MX Commercial [id number]           Self           

     [id number]

 

          [facility] O         [id number]           S              

     [id number]

 

          [facility] COMMUNITY PLAN           [id number]           0                   1

[id number]

 

          [employer] Ppo Medigap Part B [id number]           Self                [id number]

[id number]

 

          [employer] Trad/MX Commercial [id number]           Self           

     [id number]

 

          [facility] Community Bowen/Ess PLS Commercial [id number]           Self      

          [phone]

 

          [employer] Trad/MX Commercial                     Self              

   

 

          [employer] Medigap Part B Ppo                 Family Dependent 

          Ppo

 

          [employer] Ppo Commercial [id number]                 Self                [id number]

 

          UNHC COMMUNITY PLAN MCDHMO           [id number]           SP           

       [id number]

 

          MEDICAID            [id number]            SP                  [id number]

 

          [employer] BC B         [id number]           S                   [id number]

[id number]

 

          [place] [employer]           [id number]           SP            

      [id number]

 

          [place]           [id number]           SP  

                [id number]

 

          [place]           UNAVAILABLE           SP      

            [id number]

 

          [employer] [facility] P         [id number]           S                 

  [id number]

 

          W CATES              [id number]           SP                  [id number]

 

          [employer]           [id number]           [facility]              

   [id number]



                                                                                
                                                                                
                                                                                
                                                                                
                                                                                
                                                                                
                                                                                
                                      



Results

          



                    ID                  Date                Data Source

 

                    [id number] 2020 02:30:00 PM 

EST [facility]









          Name      Value     Range     Interpretation Code Description Data Felicita

rce(s) Supporting 

Document(s)

 

          Follow Up                                         [facility]

 [facility] 

LPEMNr9sKtRLFtYnGYLuQelOSBqwBGegJHSeL4A2WXkfVi8KSUkezhNlIYLlXm0+WZGnAC3dfn1bCWOa

gMy
9uQUHrKesqA7BfJBIug31SHCGeRKoLZhZaGiLdJbN2NDQ1JfFnHYJ6SoIsAmydTH7eLJM9CZEdKIsuBX

QpNYHaAQI7UVKcLj2jLVlsUDasNh7QJQ0hq3IiEKSfEHEhKuvZXXbkBQgfESYiFGPkWEEyU647twSoIN

6GpBCzJKs6LWTuVuO0RHSfCkO5LYLuWnMkNiMrKBKc
K9Rrv504epYqkgD9AA2YZ4HrGHY3ICa2W6zcHqRcVLNcVOVdHM8rPrC1GWBzDb0GyFuoFIPcCZMcZu4T

vRi1EOH0KDGuKf0+Pj4+Gf0sjkPsDlcGRGUuVN3nhz63PQ2VrOIkSF8NTYclH41rURjmGx25YUywUUZg

BcTbKSl0Fa6cHhWqp0GdI9YuMBt4D0gNSecwB1NnOG
zjQU3hFQN3YUHwTu3+Xg3lYPLwYO03JGJeVPBZU6OqhiAsziIgOUh7LKEbWa0+Tp0whyLwPqoXFRIhJR

5dsr84CE3IUC3uvKgwEar9QbGmT58paOMzS0meEeGpE1DgeBfmXGPqDH6lE5ChSPhwTMVbHN9vhxXuuI

7LyOx3LVAeXm5DiCG1RAPkG33uXRHkYMRITADye1Ey
VN6Jm2pirhHvWFMmFP9NHFRsP3TRN0HiH3icvLfrBDIcIY4KRIoooEKtSBf3GH7GlHUeNJUiN76zjG3e

TI70DCd+YhO3arRkqV8FoSnbg1GZRX4YwvlcjoS3ZLwvyR2OS3p2WHavYPJ9XEYOl4t8k5k4ihr88W95

4A4rmQIRzVoG34iR/An7blzYg8AeQ6+77fux1t860g
JIrLBpOnDrKYIvJeGxpvUjg3YqRxHlyiLPyZiSaBqUBJIDGjvDBrjCGnk4tGcWyLXnw8AZ8PTlFotEAR

6EI8fwAuez+o0IHNDBVHES8WjmUIyKH9Q+YP9/VL11SVehsB7KZcrTsfXXUFs7VEp8OUANUITDOCNcXl

jvAQMLB4+YbXHYhrl2v3YeDuXJVp9TVm5ELVIf/m+p
523aGByCgsdQh2HJ47kOPY44bhF/Hp+ZDQZp51sqMghYgSIvELRXjx1HNHUTY7tUp8NPuoEVk3vFK/FZ

ArfvRcCYYV8WH5hfM8nyt/e8K3alecnCK5zAUg/q9B1rrLJhTHUVet+SklN+q4fu1NaUQaWMgTlV4Js2

FsH3aSc52P5YAj9AkH2IsI6sWi7gwN4jvhn5Ow1Mcq
/GaUar57n2V+dlf087qTLyVD2B/Bz1bE5qweIQy2SB+rrZLSvHHpNd5MK+jVL5M6uFTPW1/mV6c7Sr5d

wx6y0BHl8FYduM19yDoLlXVMaII8W+Q9n/nrCg/1fK/qew/2D7AgPJd/ktdFY7DSTIixI8WfJex/zLv/

zLv/zLv/z/C+DH00F+vNgy1/KBtoU7E/oARgtkGSFk
ioUhvGFCTLDUcXsV6ms8uA0GjsdYAbYAwg67LDZ3vyHIQrLkULsGSVPw5+a7ytiPFnw04595sIKO+Bx9

6jKpmG05t5+fwG53wTaOTwJDOT3//E2CPOsCgjPp1+urID1q62kisZ27B8UmL52JasSGAblsw18YXYkV

g+1Bn14CQgW/utJ4AK8ehFGaqDTlS50n98teMBrk3/
kF5Gg2autope0bRb/FockelTtkI6meFPrkCntsYfqIimGzIlg4o3lA0I4kGV6nf4/1yzRaMyFXUS+n9V

FcsIjIMjnwTMDP3mh2/WrMp8MG+9Op0apZiG+V0y2FhI5lL7+QRU3M4q50KLlIwkteJG4/qmoPPMOUqG

7e8WLoe686i9osYxkxQj6QPjdxNdm7LZj4tBioeWwS
vb8tn8dcg1HRbvlzrKOfPANv7Ta1aIE3FO587rbCJroQ7tRxqfrap9kUh6jZrG1KAKSSpCgoJjRcm5qI

wkn8Y0e6UOsjmE9k3bRcGwcXvjl9olDNBz+832q3P6WBpIeFWAGd+2FStWjYKI6ov+t9G3mDbJgo0gPC

ylvHCwi4qEn2UQtI/uOJ7o7WnBV2WauOrslyYId8jt
ZDLsXjpfWIEw9t91SsjByRglAFH4YUIXhCiigoF8txd8hAHxgcB/25+n3jd/D1ar9xKMR8QA2rr2DXJR

aSE5QU2tp+sjTyk+3Qubfb4xA0huRvO/GAEJccFuOy2/iG16QhiynS/XLTeiM1ECdJ+R1iUduCZZRH43

yJi81uahkZ0PvcDdraRc9hGqe47vG+udfgGcyjRq+V
ofWxAJlzpxACVTX3kfBjbYnLowaiEmgl5Eq2TDooy5GyEkgc3QCRWtgwdVu4NV+bJWhvEbfqGKn7KoHx

fdteqvSo7P8BHCanerwivt4oGmoK0dd6TeTYUhIIuSaJu1MyvWqBPFfuK7RNszx/14hguD/wfEa4JdN5

gYAzmtpvVLh20/sd+NIGhzXpTrVrPEm85XaI0Wq1wJ
fN5xwSqi/vYf8ov7ylmQ9nmVQX6FLYNfDmODY2Owz1ynPwiAzUrUetymCp6KG1MT2F3sYbVQPj4L04Qx

PAat4JyzGI4XVbubARbdymT0HQmr/ZoLnQsehofWi2IM0vMcpzxfyatZJsZdLvChmw3PzWv2lekviiY+

0SONIgZ8bh8B++n2MJ2O43+G3St4viYrJSx2BmjdgO
Cp2hWcFdNJP7/CqLffvVWPqQ/u+Yk3HguSD5x+/M4Bj5XO39s1mrFaCdrug/iaKBIDWb6NPe/sBmMHyo

k5Z3gnIpcWist46Nf5fK527au0zzOw4JRo6QCip7uGpt2fSbWkrmTOVNksqxef7AFZVKwODlh6QHjJ3z

rQUxumfGoCT7xq6lLiZoZFkaYZHwSAt0UbfLMhOBFx
f8ehiQ0v+5eTV9qiBUJ9dpOqxaCD+riCbI2DY9SeehqKjj/Cd3/0UQJB0wDx9f/0W+W/GJdwaBFnc+e8

JOIIW8rngdIPeJERuimklzWyLIyfcLwkPKXHfiBiureDCR3o/yS3Vuq7R4NG6x1OC38QcbbDqAl/TZTE

7gB4nTglWKOK1eJLSUsOxH8pySpE/+qSDJ2u/ahL37
yVviviPCRCTqYCXUPAuGGM9bhAHIPr4n0cjBwVXx5w3DeYU7JXdpZoUFsx7wtRV4apa9TOpmn6imvFjH

lM9Qhb3Qk7+2ehdmuzoHtu2lDIcbjC1sE2bnWziIlUihLdLVfXYo1+l0SelnHnX2YeGyI0Fy8xdXT+M7

jPs3vZZgIdZBuucf0oHRv3kXkgstHF/w/OrHc5iVpy
xMYiL3z4tAorsVTqZCYm+5g1eG/1MfCkPWagU06s5Hyg6vzX4kI6KET04Kz1zY/b/3O0Hg10663nKDMT

6ccsjH5AXZRSWdqrK2CDghoMx2nH3/+7TKPMrbJl8uS7SNHTfHkRbO2dtanLsekKRAvxam5Qa5LgGJA8

DPMMKpkCPCLTytmgVNMd8CerVIBEz2CToCqymICKgt
fjmA97xfwGxD9R7kecJuI8IQbf76gXa1xCarftos6Cc0O9KMGzJ4ypO7ZmSKiL7mbrWhvF+ByVXXkcLV

2UjOCylwow8wrWl7RYBBAEZLvHSBM+0ZffbsPMsn+46HRPaO2058mSEDQJAW/xaCm8/A/KPzuZXf5t42

EGzcNGe+UJrPQb6bTfVQ8U6AOVFuUit6aCIj2irBF3
Sbu6XwwisxY3wCaNKPexi/mgiWAsjOZYLYWnZ/tH6x6hZf6IU6aC9m0suu8mRqx6dYXGDW54iv+EzmNE

5cG19E/tPE0JiUikiv0Cb/lfMabKauZUm5iL/HFYHL5cLalJAV6dvB170H5vc8Sm6fbI95ZFeTmHbBZm

NUlCI/39p/A/EHEg1ueP2DRQNx0lz3c21mXsyg8k9d
Pfi1DjukiSnytCdoO763tu13lItxOdQZ8LnX+49m02Qdn405snTg7DlQQGiA+RcftMXR0CfICSs558pd

XY4xgtBoMBUpDTYTCWgAA2fATWokoG0aSBObk20JIclRcXYiKmGc1O6YUcpUdP4QFGvaqIop+tz/S4Dl

8pJLo0s5W4ql3qWC1qC4H1oTkKsoDt+FHZp4gGXvFi
WjMkcIhyBfxg+R67bwKcoVEvmHKuuzdpgFD8tSM/Ha47rJBXXcShbEeVYYQoFHRlfPlBcJsde5gKX+hw

GNY95ivztnRd1uzu7SDOSB4ZGTdGqbZ1YShDtVHV7OEcQXhPKF+kYUzaoGpFadd54MMJp4HCVgRNx6Cf

rPEjP2cprcKEXEeVJa8BilB4aMecHtUSa8z/k2hThe
TXr2OXntL7jinukPMnERbQXKS3FRSbwQBQ459pH96ckvBtCy9ntcCQgtCeIEJHzgwCQfWCtNpU8ADocm

MeyNAvROP6BRZPJ6xWW7uVAccv6xWqPyumt6ftgZVunMDrv0Z0KwCFlH7hYrWdBD4NBN1vIVVtmiK2r+

aRXPHlPCH0sQ7JbA2tFRGM74lIr6a0fYBPu6NqTTr6
MpcwZ9m60wThrakoxzDulAbsaugc9vijcAHRmgsWFXyOheYgUb6k0z7U48ftkJWgGqT53NS5CsgJ0dWL

9K57KFVdqPzlbB88iQHfdVcbJp2ALWWM3mjaNi6pQlFYfvX2W7eMfzIWsevwKycxiiEWn+JVwkcCRtBP

/F7xfu9Ys3NTLOiyPFHPVSvicKk1iMo4t64nfI6xqk
+xFkFNmxr2vyVZKKz3OzKQzxZMFrjNSTCZEz0h6D9pL205b1xOw5XH033sYcTJh9kM1jar5tGP9IqNVR

HeZPzaDt6MbqH0JIdxRNjxamk8bRxYIqMNhyLlOOu5pMmiVelZW5A23/SBiZk9pkZhjc1d/56x095g0T

tuDyy1DsVtl4Ytw0sAKet+VSZpjfaouqfSe0yCITh3
xdJYwXv0dP+/YV0hjV4GSoSnGmC8wdJKVEI9DQM3AKzXTwicdfZvMh+4fleMLhSnwE7QFK63MQpH1YAb

K71g3NMM1r5fuxWdie/JXxQczrCWiF0V4J+d5Rsf5ltevbEVrYSFLJGsC5BrrNRn8mTfm6ZOsT8m2VgQ

9BMSCTZ/dbig/z1JZUXF60lO0ucOgXRizrqXu2OVJ4
kEtd5fd5ezfWRbkQHfptj11XJLdHs1V3ojRhEwn8jZhVk3xHdPuC43kdHRNbRWlvkDz6uigRTKYytgve

XFl5zFJHcBdogdOdQ4u9e964EZy63K/jmfkOYeP7TZr2hQlC2/xfEfNEHdHHMDNmfDl2vId/qu4AgQds

HRnreXj9WUISuN1YB9sNve7pNd97BcH049Rt5Vjp5Z
TZ6Q6AhnsNYcpEAX/6N/HxoOu+jDYLUgIzNIABjDLam9ZG8j3hldvLeqKmT2p4rkHQckomS2mUwyN8BW

37FJ2/hqkAiZMcOyE/uYYY0GXxkxFeF++y0miaiN6DUpmFlmHmait7CrCZ4FYWyxr23Hpc8gltL2Pk+7

wJ1YcTUjPctLxweXifG8L+UsV+Wj1omP4eMBiHK+96
4E5XiYB7UbSowTz+jv9A4d8qnc5g9mARtJOR0q9nU7pL0URMqWf7TLM8i+LOtBkTUj3Pwq1L/ghybkiT

K+GzRxHjPTnA/yuZXewIPosT/PD6vvQ4h2m6EgFJrr3xeGPpmpODrP/OU7q/aawwncIrOm4SyG7wRweR

mThRQNrwI+fUkoceqdd4aKqf9dSOI6UteawCI90RWp
rCFTbb9CvBAY9RkFLaw6V3jPWfUzZdTmN3p3IRrgbhC8c2CMhPDWBqXPhP7jwp3KwICEbJpRRsgZUbEr

K76tPtncrIzAfesFlMve85SUIy4bc/llT6ggkLmzetv4E4DtzyyZFeZPFZZXR+QebgsEhnhOxg3BhJOa

9zVa5Y96tGirmVrxwkOpD+pkq+kQXRs5TmpdMVHCfa
GOst86QXOZ7X+phV/UxKIAUvnrCqyctzMj/mcfM5+kT5tohnGGgSTe+I03EkISDp0N5noN3Q+Yr9iu80

H94xTU9JTjO+iBEcdG10hDo4R9pyoFezm+omRqORc5tdUBwEHzk1J6M33IBZ5sEiAIvkLtlf4SgzWNaD

jVHwBFv+ljhY9n1uXCLmVprHd2fHSuCREkC1KfKOyJ
JZ1BHnJzGCMjRtbbu+lKhoX9WOEkNatDfUwPL1+j0mYmo8q/zWahYzQSj9AL6I7IPRgNd6dQoskgRj72

LfHG28v4bK+lNhtDXPi4Ko1QoYHoxCCG3z/PSaJNTCScPy90wUIVb7odCQw7vDklNv/Zs9nQk6bvWsnq

5SJLk1RqmxDzES8EQUWp/7nRgwiv5EMz6NdonzUJTS
QcQk00NOnIhz2fOyUzYksvofTTKZZxEriEjtI1A3QT63vzSevitphHu2l/QjyNhtRD+PlgJFcQPFgI6g

27rK6Cn+jbx7JTt0Ht6ly5mm7q0HmHixhb5KgXV0IgfqGuLxsXax+TZyhsXRcllR3AvUDn6M+6OHZlWP

ADlIVGK82nHw4kAEhKe4qDyZUwURCxVwLuypcNuKSo
Ee7VhUneHGb+8Xde67qa1ybsyRngdK3jLtMTKfZ5g8yqjhuP5LI2mSf5YYM1oOp4g1z2Q0Dr6I5Puo3Q

KgirO1v6fzoDe+MOQodS2C9esg+GkFxZzb270MiNJOGm0qW1XxcaKWbWSr9hK/0TDMFicYaF8xYenBt3

7a6lI2C3im8JAO0DewMgeE57Sd7sXBz1WoekgG53K3
VQN3fz+XZVxlcxhe4L2u3jIXe3sK43k4NLkVs2moRv5FqIaa4u6jSOIN1L1yU76YYj4w4FYfZZGOCTqr

ZBRK+idQcnMz5xeLsmO7W/4LSGv5qKOGhLp+qAZpeRseRbVqYsThk7YwZWXiW7J/TRKlgITzBUV9EWqv

Qlb+8yu0kyIiEfznGOpEPCDyzlle8Mm4Zilp1QKCm+
K8qvDwfuvF3I5ozLjSTLCOVIMP0E7apWWpbrrk7bfT9zkLXCkSDj8uP1EtUroFkUTws+LySbVgK6xtBu

kowsYYrZFBjEC8cmKhVQaNoGTTrZJ+vvy1BZk2uvTJHEERBm6nYI5ioCZTAl1BBGPwX9xSgDHZ3t8uax

AOKvCgtcD2MEkOWRk0wS+Bhga7vJJ8IThOvjhKFqU6
l08Cm6nb8gaoVfZSqss15gQ7aazcJ+Z73Mfd2gN0alXGGfXKwSocphsURRF99WNFQ9vCqv0ls0ElFfck

/O3T3z2yIHrs2s6g4TpEajbIwhMXa6QPvXNaSdbVxFcKf46+LCLEBBX3RrIZvK6RFbAgDwY15/FuRQYD

dCFlRVPq8A9SWrg/ToRynTwGde5T43lWUeEkCIQQCK
CtmIKfFRsY3YoQ2VqpkIogyrobluhAuRiBVZlT048HA+bJ8WBMA35a4KHaSQ7BB3O6cb8KI+JhOi2Qhv

3FhEisi6AeDBpfctWUBB4eowOPrCjsvHPaQq8D5H7d5Iq+t6Nd221674Jg3paUTOEXGDV2ux4Fe+KhOR

nz+NqWMqwBCaPb2ueaHRQpi+VztjtLM6/Cqf8l0qe6
IUp8SpmGFDZ5UVSnKA9J/33AGjnNXAUkm/+VNxHmJ3aH28pPY71AJq/dmc1nETvc9k/ZjdkyejrAcOAZ

K2f1oTlluGjF8PDOJxcpbphL2Kr0Gew1AMXgMo+pIH6s5g7vuHHEsx2En76zqaVYv1BZ9UUrlRbEhUAF

9Hf1mwT2H+aX9LUjiM4sv2FhlKVsIk7k4Njosesnx/
gvMyUgCh+ye1sBgoWdGcxQjqRsnRJbemQ5EyiEN8p5FSyrL4fbRMK0HNqUScmYIVI/wkt3oXSpKjq9r4

9GdzAD4Dqc/I+V+QaWfwT8M2ryG4/H5XhqyUrW30alfzzhZ7T/DFINGJXgnwa1M/kW3U9Cw/uPBoXfL3

C6/MTl4eUmWBzN+o9jzj2s17bOZGYZ7BJtuyXyzNfe
74JpqQkrlXpamTRQRcnXP+kMz/D+fluxNuv+FiLmZ36b9lpEOJ2PJ0hu6YTnii0sH30NPpzLkmQOJYMl

3zufmgtbqArcRJVEqlwh0qo7e5ZFN9xB/oVO+4gaUGFc2mwMqrq7iA7y9cxVobCp8OOg2ez6tlhAbQJf

x5fxzMTJdVwuwrvSoCY5600/ghUP701w2Az+aYkWUl
S98+KuXXVhWzezJUmRckg/hR3rhix9p9bDweLsURv6GOzPQWu4dNV+mBEth9U2b6pcBob0MY+SgAcbjJ

R/Ex6qPNSCchemTdGhzeBVT53GfWON+3ia6U4eCrQnUvoYwyDtMCqaYTTEdgeyBHVekYVoUMuouEuy/T

ugphULoxDMYTiWQVd7RMGy4FhpuMOexJ5F3Lc7M5wM
zaLtIEZAIcUZbRZMdMEg3YSxjhR+mJSDEB/QkdkP0yylCFGpLVJoQTkTD73MYctJWyah9F084guX1YNv

z9l8Vcquij7CQg2jR590febF3ziacsOdvK1yi/GFHQWcMDTlwiAXMvtvyNny3FDmq8aGpyB3D1CFn6mP

6avoYnuabIkCEikUUPEHBEf2ATKi/z3+A3gMkJCZin
DIEaQsAFvQnCCog/thXVOvVW+mKIESYKh4D/ximh7E9RN0lPEQnK+8kQZkG+oseCL9619WbwQMjUtN0T

cmzwB4nE9PRefEHj6Xh8puZGz66Q8LW8iqm7ZzvgriSoYKGkzrdRvXjk4wi7muJ/8sgnzGyhpuZpj8SE

I5GeyxRuFZe0NytGbmwjs60b/L5yZrnZHlD8TZ2eF8
/gVN1NH7+w73Q3JbAlJdU3XgGFCIqMKB8fXK+ksLdJcKyV2wwtFISsfkGSQMRsUE/Y9EI+t7dO9ct61Z

lVD9M/AJFUfWaVRJ8PjrxI++bnstfIZLYtKEMXGYXPBFZzuK1nhvIXer+U1cZQl1UNXPBA+CA5RINEqj

78IyeOP567CEgwcfVgUL1b/UibM2An4CYtp8GsJNgb
vfQRC8w093qYzmUZ/1Xwe4uJL0ZIA2WmNlj8BqAUqdgrJ3vTUY3mS87SGT97vnEEuZqehYou3/AlHoAB

5cchW/YyZZtVjvLjHOwDIV5FCDdimQVqIle6sRZEiuO89sCJ8AbDDJn+0DVKq6xx29/bBPlDhSYxY8pi

VQ1ygeswezTEy2UHfYEngiBDbQZwp13OLj2iZh4M2f
k2MOiR/gbDqXNPN4yO9i6IXpEU0jhz7nGpb6O+NN+vke9sI1gN2H8Ksa13qjPwPoFmJku2h44Q5jC67i

Jcxc+3qxRoue0OycplBqsWfY/0ceUyIZzAPMWH9UDyAt9KCCRmjplogxUU5gs6xIZ9CiWaWPvesOrt06

P0MeN9wPjDAQ0Au5LIKD8xzav+OIMqTaltrZgc8snl
55VPXMV+rXQWQzj3S2X9dEtxKbePzoIpZyRUU6qN12rhxmarRDROWpcxbjPQXrQ47Pt9s9m1TFdT06do

CiufwmR1/0lXCxEYnhAvqMMP+ZCj89b7E2q4kyHJLQYELuOeYe/dMhv/V8Vjo1vNzLUHg9/3GY9BYuFC

rUKhRg7lAxUPJKSm5EMPdfds5MRizhEhHX6wamCBV+
MiFWuW6Cd0A27Mv3czD8zXLMzdDynW69OZ8Fo5FIP+7EUmDh9r5wuisMN8Ipl81ASgzyehzECwHEhVws

TfUqEsuhwLDrh1HTtvW5+IDo1196fRzscMRWlgNNSRGyT+GwFckAHU5Mdtspa24T7AaFaRAtz503bVvL

IIJudJQN69KvGAwAtAAlPDsCkhHpqTEZbNESMPxR/4
Mas3khOGXqMpg4/SA/JgKUM6oEAoemoPnIuTwx47mGt0uvMFD+ctRrZ4e2pT3TyTMhyJ4nXHOFvEg6ok

bKW+FHMj/ToSjwg0BJqD+ZzHWZ4kVicfoNqNnHCY1RzsMMMd3ebBSnLxs1MFWHQIxYHuUElrZtDMT80j

zptMtF28IA+rknYIO110sTyF+qKaaQz+BzRR3R+wqc
hPwJ4d+3pZlxoAo0ZzRWfTTflwNfHa/UngqUWl58LVid1rhtbhBl6mecn1pYW69IUknjbgYhVtyoMPFn

gU3u7KpdwkTPNz1ZDMly8vzKsR0IDs7Fj5b0M2w3V22/gp7V3BF815js/8ri3Nedx70rVTyi5dSX8VZV

IdrtE2mNSXFmF7FK2+fX9eWSyvO81weu7rrbxaFtbB
4zGODcK/EkuQMzNfpuRaHIE0SiIUAo6YqDg0pjUtNJrsrv/CBSBX4hApSfZt7YJB1v2u0O7RMH5i5F69

xaFRoHYQQfDmhD/uZYMgfZpOz6q5U7M1rWaf7a9A2Yra4nj8IePvC7f7fkvjpudbawXupnK/V1GWXvR0

6XN1r8GCjLX2h5mT18GtWfueMjqOvbxZM1xEpoLXZK
x3fsm+lN5s3QwuvVq0raGNWQVwkoxpkYWbdGlC+1Xrgd+QkO8376q0/BxGJW0iyfCm04jW6fHLrIbL8L

D0lhmYYYd4pugjjEmZ/Ijz+0EMjMHMobGDDwUNbL1OM4C8ZjTi7BEsZsKQcvNp0/c0/Yd9P3gXErJOeX

ZB1+82Ltdc4IbROT6ZIWiYh5LclIY4UxdHGeDnHNgB
HYUfxB3D1fUzY5wcgzED/ibwVqGvDqQ2fpQGcn7cU81xSeoYDYIZ+0J4ui/Spw3gVV1SsK3RdfjMK1Dy

brbAfdsOK5ZNR0uDfG2qpiZBDUbALekSa/6NlB/y966b4rt+FaS3Eqz9eltC71bM5oqV7Qc9TO9lCB12

azINfxqZCSN2TutAXI/FnYkbM21s4o88bN4pH8zBQa
x3b657fDb4Sk3gFdOdEatSMnSHpW9gkxhytC6SE7eK9dQ6YD/w0Q/gAANFSEbwmY7d9AUk2CiP+JMz29

fVI4RWsXX3uvCMTATKsk5tlqPGrzO03AJKo+TA9deFfbQ4GONMNg47VR44TTN0uYODIU9Fw6UxJfcjAY

brm8I6+drVlVeS1pALQPrgUrnjzglF01pMquc8C1x5
GpyVx94oSXY5Y3jD/S/TH2H9g/ZxJup2lmg3Y878RKSeZGc7pJ+/sH2tnBHB1WW0JCEh2l6Tz9wiZa3n

QpLK+hXbO+JqBSxFwnx/P6bYXF6JpayHqamwMv2TCzeeu+0Yi0lE2t+R49eFjetCqhSFXg1sOYcPA0J6

6lddXqhbCj7W/Z/sDiZ2RcSuo401nKp1rDDvSJROsv
Yf23bNMhymeuT5OsF5MIhXNJ/VS+fTUV26WDlglehtMW7uvE/ZWrEWLW9+2Qrc85wzDhlUuCtcBouJTI

KyQ4mEe4O9B0hET/EBv6CscPdOixdMQK8xx9Xtj4gZKd4PFRl1KHSs2nBXSPQrS3bYE+W1xIZFqQlgXg

2mNGt6B1V8NjzeSl21+/HNHuChNU2VEp4ULZvSvb66
X0Z5nnXuNi9YTQfzRCNtwqb2OPDs4FAgmjImPYySQUpU24Ux1wCOo2rmCOAR2AvWxlEG48hdeGa/3M7f

JIl6OMDrjtsNZFtpfJkpg717GTL+hbLgCtG9Nnno9tOqWJv8M1jhavOowsG89qsjdanqX4RUh9AJ28uO

W5awstBGHQGEIGYqTttks+Dm7R3FFSNd8Nv08kRxCx
omViT7rKHdM6Pj0BUp0kZTC+WrMjI475Wjoh5h6Ig8bThxEHFTqD+PlOa16BBHHS7Lk3fzszbJzFt0BN

/VUSho9Wx1o8YweunJ2DBweAf9HnxwVcjyMiOTAfZ3oWuR02oDoUugsyYuFs+43xF7RQw/cdrOBKPJVY

3pmQmmU4QIdgbd40zVxQYsV0SYzZkaZIVCLkoruivV
ZtvOTPvrJuhbN1/HecwAn0KCvsEvS+HiUhH8prM8f9CukvlwaOMH2vG20RWqShCwyYODrqEDw2AH/jE+

64URUMueXT9vfa8lLOarml0rqsM9BUZ6V/LRnc8btYYr7IGJBibPRO+xczybBUZSebIsJifTYMxKRPCA

klg7A5yGBRAmwQOWNAvC7f6/u4QW96WfSZ/E4cMNKr
oclGrMDPbBj1HSyoZH2pq6VCtsngbggXad0SzlUSLN8oi4PEjf4jUKIHmW0UvDCXV+hHvC2slHjoazX7

oXEw2nK6+hI7mNq3FV292SJC0fTDGxSQfkSBCjPjMfNi2gnBuEWNhgP7ga9pGSW140Fqeg/wzy3pDFrS

RHVs/LJQJICXPRoxMDRVM2Jy+PJt+LlnPwSdPa6xQU
YpZ8Pyg4q6Dems+3LteETdA+tghfKjoqIe1kGWW2Z4iskIQxh3zi0ZD/vZWFjSJbgzOrC4GF3ExghWqN

eJkoWvWs4weSiA3+AFw7jN0EN3pYc+kDdvFQAmYBuqtFwhbJIotHQMv9uvNag+khVHnyLpVYaI68Ueyx

RxD9OOoIN7TOa6FGow6IJo3XHoYN7qyy0zACf63pJ7
oaYO1SFDw0+yVJTKYBabkbwQIDSSV66acLOgd/uLtGrRQy9RXm9REzM5SSkNrZ+Ggeviw+3g4AzwujQU

d+V9U2NaD93tbTJB4i/O/Fv6B9Wtqkv0j0LqhLu0INJfDqICMnq68prhcN6dTNg7qlAQMjIPZcZ+BiAT

HZJoo978cL3gnM6xc949UC93688DbSnv2F9hfYDcn9
pddGgbumdRj9CA+3BCSJrtOBGLTzjj8VCfSCjydf1xoh/BPNOCuLl98Qcz3fXoQu6G1TNimNR7WLDiqW

LE61C/cL3aBLTiHrkhbLu5jKyV2B9Q0z2MiGHn30QOVXpkGz7zZKdo7lHRXhF4GkFoAOPrntFwjgA8Ix

caF4tWRgaENXFAYt6SlRBPqc06CIdJJCPDTzO7lTXi
jMDWiOCAnd5dnvvx8pTiVRlAG7gAlEf/E/75CwIRrqrcbvV6KozB3u8TY2ECXgX4hzBU4uj1nXxfbSy6

q1oh7VPCX8X2FSRt0HlHG3lqGZcDCbsv2zCSVvpYV+4Nsyh7xQ7jAn6mXv6cCC6FR9blqM5/z1dQQPzm

Ohm9RwAIiRsQ3Lv0288dJgt0LXL671MvZJPUh0B862
MyBic3mN63GNfh6D6NsCajCDaxyT0j283igZEl8cFd2rR3C+13I8c1/1OG2/O1sSO9RUgx/z1TaE7r6R

StCqRETPdaIsnd6dcbV1TuvZg4Ja06nKB4efzIGk6xOh/0U+6KuQT/4UWCVJUPaFpVPJtdDbYBFFNsuG

8Z1TNBOBC39GfU3LPLfL4jVzOMcpS8UJkIsodc0ed3
YXNFwvGEXtPNyvT+ENj1MyunfZIe2Rc6jAsW0j00Fgsi5iZsDdMXMVQ5XPqJnkRodxckmVHh/sv/BUcR

pjMgFbA89ar3TYz3oYU7F8Hbjor83ROAiZVnGVzudvJlIRy4iqEH4tvMK/zSZBGiIi/wa794MR7ywJkc

J0Gzkzj+eVk1/Dk5qq7u8Ma9XahPf0PppVf26wQAsk
q4W3YBjxhVtRNp1jEhn1l1ZUsxpnmHVz4M7K1jKipfbx0eoh8u7GTeubBhnB47B5pr2+WGgjPqsT7nqF

eh5bCkLblkgmnka3rdW6WBilkRxa/r3NRmUZgswZ5iPyZAprdk/gAyw9dLFeFqrZOe/qQIhManfGrMlp

gqitvV+ZZ0sAx7J0cxWhKpcL6g970DwiS3EJktJOQ0
zQbPbeXo5NhDTQBTs9Ljp/quRI51DxMZA0d+L5OWwRNdXhiEvvPXwvFp8voH4nfLe+1FRfhSNSuRIlDw

vXrlpdqgWQr9JFvcC9z+6ckg8Db6DIyxBedpIe3Z4ISuO07RJM0VkCU+HxErAxY8SvOTUhHs7zkHHn8/

CKtR6ESZzjJiivayysGocS3FKYeDO5sHfaZ879QCzq
7P3wgUv/8xXSuSZCMHtlPwWbpo+kPFMGneQXueOmwBbUFzB95v+UIYDXSVbK/Hgiwgxl4jrFob1ozITp

1ylB9giX4jNCkeucZ5E194c99bgUsY1+siEF2oW/QiDAjhc53BXZ5IMdeyePKYKEs/LZsJHGSoHy3sjK

MTN80Nj+J7f6YyROach0jHSaKTLtPDy6bhroO5PCUU
vdtrq8WdoQ5ybE/RkrbFTc4pEHzSI2fsqHA/hv+CWUynQ+6R7ze89PESilvynRlMl9AML6tMi803RnoW

B+8GnC/4otpOb7pI1xLcJmC/YWrvV6fLaccM0EpAID7ee7A0dk7pxXtlKS+7tDbFADj3Opr2GhL2AT5o

R9JXox//69B61uWwFA58v/YFecbe8G682IOkyOsLBi
xocFyHKXnoS39EFuiG/kDS0vgabzps7KBXjR0EV5QmPFXZ32Q9UrfxmzBWbinUro0UhlwuwMuP/JyWIZ

MmGOXweXC16d7MJmxuQIBTGEPp5rBhpt8nBjCHv2zuW1Qke8bUEg0yJpRF+eSPmIS5dQYukgzxlTFYZp

dX1+L7+FphF41jDewzV7rYmCyShSn7naEhC95/AuA/
/aAo/ODgmJU0Timh6NHrUAnMuBuo/M58d/dsVRZ/pS1vblzTmwmIGAXpkgeME3sdC/wm//sPtdnRs7Wt

ai1objVuXRoBwBQ7i99e7mnbzlNc5+jTcUwfgOVttmkgiJaosmthmrvBkOvLhKMujQfwDc6XYnSB3npk

MPHWypjAnVLwkoYs3CQBs+RqYdPATkvXfMqsYs2eEd
GeCSstssSDkusnQ7GkM3GM2JTE74rkqNtdZgVb4c6B6C6so8p1FRZ2itCjynPuqa499I4n6hFbZ1eZN/

gbXLnsHHXEOogSXHgvagPAohVTsQApBpzNtSbv8Kyw1JVfgnPuW+j2NJmm774vF2yYg0IruRwa3VRWcz

lBMwwk7T/tHP3h5shuF5cK3SeSxr9tQdawPjZ65+hR
ItDqnR5TBLxiagJClDVqsP7FwrYOKtBB9J0tqs3fGxNc0M9iFrUJzcVmHf6tEHpoViU/Bh2B1ByHy5Eq

OUKlBvPnVXpbFi/wkCDELyzFQlu61eFtXX/zOCFUJd5W263znNJW6OfSbl7/owZE7LN1COecVJTBANSI

mbFYlb2+KU9u+Af0SHv+oovPyqrMgJrYPXkWOPIGcO
SG1o7Nv75UKxTpPTl4kbFrlI4EiG3HjW3NRVd176kqoffaDYDxiCP4oEqo39lDshP0kpcv4DN0tJUKU2

Ca6Xpmc9HW+9YsnRBYIwf5bcPOTrZggjyz1kxMAZkvo11hro3HFiueQrzqOIfFZ6BD8SVusqPLC9eQ51

/LiAFbDb79BK+1lKrKwg51zseds2Mr1C/aO6cfjpx0
MW26EFQU+akv+dW4KebG0pEY0fDJZsl2U4pA9tv5gtlkwR2oAW+gtq1kNhrjV9Bdl8odOPnK1hrqTU95

KfrS6ClVK9rKtXBcrjjxzx1Tk2jdoZf8GR/a5VeapnsNBpbxxupyFjwBFmlqUJYgNGe347txmEcbInD4

Wn3qLWbaY9sW3ppyKK1fo2PPlEF4XzAQh6qbo+z+Na
8vrbqWK3SXAsGuACf9VrJ8b1LZ1rFAl0PzNEMi0Bmak/vNuFZ1pSDCOI/A3/lAWeKyF3JhMzIHsohJh/

dPlthSy3k86qmvWMsxoQDLjTFL7VLunKb6uWEh3ykwV3dTrr42+oYRyWoDGd+T8VNYDxwjaVFMwzsNZh

QVwpIW1w9JI8MHp4RKGO3ZRrGEq4fNy03j8RoG+g76
L3zGrgVPjYdW4HQIwX9aCJKgI10oWRd9OJqNswM89j51f8110i4uxYynzwYxatYXlcZYxyePbWbDZzsM

i47wCUbMI8owNdFdR0YDn0xPM5jK2H2TpHTS4KR6hzevdx27ojpSfqj/Ne+a1eJOaVanj1k6JWMDw6e8

jBVP3aejhMFLF/FBGuaoCIJiBKSAxDG9z6HxkF6WaV
bHIiE1/KO0DLLfklOXVYrvt+A8gsRkFM72iLUKU4zOC1O0UDhPkdbPWx33KZvmpSrawG40U4n2GXJsNe

4YXb8IfSQzemR1Y5iK7B93qCJz9saJMW02MOnkEnpf96YOn3HoJJgOVCYhE7R4cNmVXzIZm3iEhIxrzs

Ks4kYaEkF/6rcUeWgq/Nt9mkR+3/2dbp78/c1Ah0vC
JwRyBzEAgpVzW2NLgR47u+MsM4W9CjS2hmCjSs+LEIJhDHxm+855HwYInzy42/KOXpr61AKqEFmdOq00

P9Tnd8NdzUmhJFgrv4VUl6WINSR02Slykn+ghWp28TD9goj7+89QoDYLx0xm452kh+iDv+YbYFXBLQvQ

qSVlSZ1KKEiEmpSSskd6BAUG8djO/IlbqvWVNKcYiC
NZPcF+cVBBDC3t+SJ8Vk/+s/MV4QouweTlbvbKwYAJtpTBJeEI2ntYLqvqXgUi6PB+b9wqe2Gq1FvqO0

v6QE5WQzk+RbodG+ma6/zuxmFQ/p2Y2HnbcdwsUCkQU2FXLc+aGgxtTgGmmHgVYv7LBtP47bY57zgAGP

vZnT3Fch+rbW+iOdvZqIfd2LlJAWRQWwGeQ/qRzdyN
c2DdyXwhQVjC0wvKxY4SeR36zkV/ijM/N/2b6PXnat5ZwsDpChDttIAfgaC7HPWrtUO6Ody3xhc+y+Fr

vNJ8X/a53wLHFq14/ZF8cr+O72g+PZVlfDKSa19VKBHb+y0SEyHhIaYzZtNAYEHC+uQ2xjhBYtx1QlFP

K7kAkvxezxgTMKQT/vIjbjqt/Z1Xsxb8DhlEDWpZZH
JWbo//NIo2e4s0jply5t298Cz9R8ZlSRju2UKL9NlQkwD9haUn4Ozh4Ht3Gm/D01r3Ikp71Dk+HW1cuM

DJkzTnJOO4H063HTmV3Pgpzj0P/CU4DvUT4qNbCSa6oeCYc+AVWAIamqJmvJdamGtSNsMzVNKuUNc305

sHMbHxcABFRXumLpWAC5q75p5RVWK4BDliiszBTQns
dNtEjS2Py+GhFzkXDRtbu+xTK/WdjWNKHk2WP9c6kNl57x8WOL9L8pgEGIf0hQkxLN8RqBUi4vio2xNr

0H9Fl7wlEKLuNGWbve1hP91wRkE0AhEiOhJxBM8rgoOPjNw8dznM7FBXoge5b7If5cG6l5bZHnNx18/5

CrMoZDYeckXAjiOCcxd5++d7iHmHN5YfdwD7Ye9bXM
KzQwLkU/3eoHPxPDkbrz++Mn1Ewz4BSrAjRRlTr4kBdnxXLcPzDGxwogs716iOtThfvzLeOxH/qb6v5E

Ca+dzWeShXn8mTixJuRwb9H8JCyttd6+OCYbnlXp9py5TF9FB9tKN7GJj+JSr7faBGDKE4w7U4OgcTev

LPfJWl/DO4OhrYzu1bwz7kZ/ZHCJlwg4RbgGPtatDL
5MUlZr5ujxTl0cMuRYQIhqTHKhsuIKNtHIWwboIXUhB2Va6YC5zt1jzODsJR+5XfCdRBXnAcinqAIKfo

rUoLO6MJ50J/9wWcL3DPPGulochimW3X2U0PRzYRtLIEBCmcrRlXwF84I/5a+fyY6eb03eky099bzB8P

hCf+6EHXLVftANpUUu+2RdEQeI8sv7lg8rbNQRvM5p
ZhreRma5JhgsOcPgHprqlcLt7vEN3QtpzaYT2BUmUnyigXt1MqV86Gfe7nVYoZhf777gWmezDgy9NL5j

cA2DWCDLWu3IFj2EZdrvBwyxWc1swhXhf7CTv23a1r2+ofXDWJQlrpdsxAF0PnV6XQBN3J/A7IlD72Dk

MIVjVKzw2FSMNXfq5D+tSwTrTFpiEU/M4Ljg/3gI7O
flcJ4OFjirEoqWzxLytasuN5nfd1O9h0DjbD5TYN4sbvdb1DMEOInmOsuPZW1xvG3xVOZFW+7gez7OnO

mnwCv+XqKw5xXq9vzKW5VFTAki/JycCSuU/RZpQuvttpoBfoT25q7N8+xlPjUsOIFY9mF8Ivo2K2vs8q

kAUdguq9y6I4z06VLC1ljHyaqr79eDKh0+iOp3RVNP
u7UuompGM8+6BCp7HN2sG3d2dCdN/Wo4yZz0zxe9Bu4zs8VvJaqivx11QY3QYrIpqkVJ9bV6nr4M70Np

Ga9UYbGsfQl1UMZ++fVn3l8QS83ivo6ZBIi7mdS1DzR8UXw/jhHgUet9J2oiw3hLX19cRPliTIem+1Fj

1so0lVun358LI5viZ9qP5Ix/12XEv1thyVjAJJilgu
1Jb8/kdESQ4VAi4/eveBkFty0Lev50s2lCGU7IJKLN3K4BtQdlF0BJdCijSE/B+4vNDwaNTsFmJynHB1

KKoA9LACpIZcvqQWJk0TDOaWfJEkxOcRCBWOT4IWDOXkcQwWVQJMctz/g0NMIoE46lMbM33hNLTLJS1c

8JZLQqp6I1oBz1pMRnj10+xXPOPFeGHLpovpp3burW
r4cD0Q8U25DiL3qWOPnW3hVS0G/jZwKl+JRKIbn501yU/OShgHGlUidm+SJ71WV7n+R/XoHYE5w1LPSl

fLR03dv149Wpc/pBYtSG6HG5BvtvjyJyN17sMRR0HjaWMA7/e/Z8P+51Lhq8TmBfnJi22O/BFx9PrthB

6Opj7y5E/+j6z7UMVSjUzcmm3Kc8eKhHuDIjzkoI4A
QeUqiJSmUheIs7woE9z8e1UVeaKfSjP68dUmB1TUism7ay3m+fyGYKoyLHf3c670uU3YROnjUKnedV78

6qAZK8j6Teb04Z/eew1BWF6K9jjCqlMUWdP5Wt4v3V27B18PTz4A8X0YFMIeVFkseyKAErFwZuLIhI1B

l+0s3Rdqv+D/dyHOmHiYj26Qgw+UlFyt7EerdpVTlM
uGAbUvL4qpYoA2vCxBr0qfRMEh5BJndhntD7NhY0g5EIeVKpfJlR7f/p/cthQTGx1i1FQ3O4eYkT4Uv6

i0eFgr6LQjSJqHWt9WqhgYwm7qGeh1bbOgQLoQcc4TrJcyWjo1RGnKerlkmwng5Jg8OCB2IXD9kco6By

s2VnfI5iQNRYssDxHdIzpwHa/K9qSnAGPunwm4YrN2
Lu4owGcHQxBB/sjbTwUQGwYEYcFrUBCfpsV+x8Y3oRcxOyTUB1c10Sx55eKSb1M3LJ6nkwrgU02okceB

6NiXLdNNY5LvN4VXb+WDmoorS7L20Tx9kHO06IhaYVycQMRuGVprX54uLDK7d0QFCjCKV4JZDK8Kv00n

Z8OVWt3/L57W6bxB+5Wy/mrgZQrUv2lLd1kH1gV7kO
o0X338IepDCZCmCu+LrZWGLjm9wMZ1L5r3vxH9nvVAdACqE6gg9ONfjVTtAqJmLftGjyCOM1x93kvrK/

yHjPS0g3/DrNvOsqk7TvniJ9YALOPFyBA9IIcOhgaCk0Y3vLfoGatHV+oRZtJfoKhlKSntji3L3HHejc

lDd/tBeQQZAtgbvMhcGJNV4Dgb3oTAzRE1zrLLH9xg
xdSAewIQI7JyoMxPF7hzlQnrCKqGgEjtWKe4K1lOQiVk7jREYmsXUEGU8ht75r+YOHFVybBfS7Hk/MLo

p7/wqmflsDry6T9jP9HuBW2l8RligHJ9uOlIfzPVj5BnFx0L2lyLkOA8be1jxG/jXjD2QDNP+OmOkPuk

3FFvxbXI60avZe5j518z1PZyc7l5gIBMgFJV5qelQi
RuTsP2HIvIVBdoqDOZrM/7oSBzr33it/1BczLPD+luyArrxFFexuhGi7hqS21d8PQh1fNZ0FHOEjYrK3

5nzfkDxjdN2RGeHEAQelgTTojo7ZYRNy3Ip7UiQU1G3Kso3o7x/s+1FHVRMUuIgGybMsin9rdn9WccgM

+4NjlsaCCcXUjDmNZViUPXzGfWtbF932DEVtJwIr8z
Q2Wthekmw0n6c5Pk5CJB9X35oXbkx/IU35+ujQ2U6ijy5G1ba1abfJQSRPfA9rkcLC6PU9CiUnJBh822

a3l6EokGulJn1aMmQu5Xi3IxuSkRMXiBu7CcfHzj/YVVf69tw6Ms2pdiG0UjDMegCbVrVGShkEYwoSAo

1YPqOEYdQZve+vEZuWf+9u1EX65aRO36bzXzhLBfCx
KKoTTg1NI88/ev7tq/nVEsLDJRXD4bAZFPkOozczDlq8cViHiS2sU9L8vYZAu1B+c7tVQG44/TkvWYzT

K1wNywyi4CSTsHUNdOvIQ87rGuH6i2nQO9hN5rGCKxY6jtDueekZQNQ21e9cH6VZMbVS5blqlRZUQulQ

i2gwvnmAw9G90gM8Na1fkpb9nM02lK4bOqexrz6uuz
Kwd8J0efMEtMkCKxCUmi7dVcsvAMk7CvUAq/Q535DQh0Det8S+uOkgzWGUTDmlm/axmDzHeqLHVIuLYZ

RSeEFV8caxMd1UimqxhireNBXfTI02B96Cuc3qyo7ePi+JgQ6BZHO5jUIf67PHMxPa86NrEBk+LC5HUR

qwAc5ICQdKgQyZvDEsGdVwH543JhTrmgxreW9SQo6F
t7s1hFAieZISfUP585I2EY8FzXu87slMPY0VlFH3bHevuiaGDSjxBmL0YtT22Hmu+fywi2HIdAflE8Y3

xyIm+6E4QsNJEc7LRLFSTPK/FZ0YPdDtIyv+tvjwgzxScIChSI/Brwrn0Nrmj7/0BCZ876JwQ5+l++Ob

3ROAwrpjJUlBhVkGdRbyyos51xukZjyzQkobvNauXv
IFg9FsSIzrwe8dvDdP5v1jVkH98pYM3RvmKY8pEB0lE5hBydYA3XydCZvIuA9SZ6wxmTD2lppb9C+SPk

eQrBdUkE+AtTM1zwnxIq11OAmHCtvJz7b2muTiguCFEKnKu87Ab1tdbr9aPNj0f7Nid3iRJccWRJdjyc

W2P1kjyD7mQ8ZajeqPoYlR4NDXoDwZIT1Gu19ql5kO
vZ+Cd9ED3ll8QNXZMPTfQsweRscHhhvsneajZWx7uc5JFmTrshSKlCFCiQA8M7SPUDuvF5y7cgZ7yc1j

ye1pV1jKEr6Ka3Zij84RGPTam/0oGfFw5mi452pZNjg9GFVau1y+Axwf3sYEk1uemCoBLhb0h5Ko6Ukn

7gtoiegaNe49xNaa9Y24MtLVVB6PvLMd4Hzn6slB2n
w570IzQAlTkjPxki4g4dvmwL3zwd6PvyH9/Pj4aQSu4UlDnce0FrowU4RWplkeRncoMZQ0JKq+BQHz2f

ydI4PjxQ02vhAr/TQhNnJ8eoJkZ+yT8UeEyNGZcyIinRsFpWyoA1kOPL0IHSHl5u/EmhqvfmGSfYvfw1

eskwu09wD0BUE+aKFDnbgT4nPVT+hPF17Sn64aZb9P
z8P+te4vYxQm0omX3y/mHCIfZjUljHwqV1SM18Cze61wlXLbGfc7ckIVXEgrqolUXz3g2XoAjDFZFjez

qkSZD0UFz/oSb1cNbfd7OEN6+azoHJG5Ee7Zkv5xSNebUoxqyGkE3dbWlTKJFPv6CZXjlKRU08vHS/Ij

dMDVDZgUtJ0kkKhj36OVQ2GrAYnCCuItjrkvmk4rsd
FuIrK9sG3zRaIsZtJ01GOGC4JExIym1UGu0VmH4/T63ch/QoKkrxXRogppvQhGYxf+1M3o/+TlVWClcD

RpyN2JNGn6iWNuiy5v5KU3Oy+SEnEe2KmeAc6ZFpAubxbt7C8WtxzbJ6SisJk8FRfu1fqTYtxi54CwgR

37iK2/jxqOo/ynjiiUXr1W9pKqFci2czUyX6oKnllG
f92g7gRtfUrN4OJ2OlwzJFtPwf6HkpCDSgAlhnL/xosDgER0kqCf8thPrqq9zJNvu6/dT/MSDcCzZ27l

mf7A17cwwsuoXLT6O2cls+vpg4oCjaKhmsXQpuAXWQ8Y4NZXStYqPzSedpouvgIctTYWCOZ31W4XcDOn

K7ORswNljlji0hku5yCEY4veeEzU7gRuKY3E7n1Mv1
pPCrBDvsbBCjOV9S9cdp8eCTQ4QhttMBklqm5tpkkRsiOt4KjjdnrOdrY59JjwK6pVVfYAx6uDKVH7tc

EXEXe7d/Jt9OUT/v73HQP3x1zv4uTCz203vM1GS0gv8OdrvFAYCm0vDqs2UwXb0+7AyDFsgmrGKAgAvz

zTbud9FjoTqGjYC/I0Y+adlsIVPQzNyPRbRgVrdF3Z
dsruda5ycZnY0TXNcLle417eLHsPabsssZFtBLYvQE+hk7hJenKFUESLiXT2+AZJ+hy/NLnKbrFdrT1e

yAsz6afQtei+g9L7cHyoO3/lsBsgzR66JtXWIKoT4bz2nP5ZLGW39qADNgN+u6wLRXNnq2mkk0jkuIYW

lU9cGjCK6slb0YTTCa44myKRXV4B+81ptHgYDEF0Dp
9mKeEkDVt0tgnDrl0jDV1M7CQGfOvHGrq8Dzv3qYh00DlZU3mFr2+zkmLf7/MHB5HxkknG0Hb1oo+c0i

utBHcef3UthsULf1I0ArEFoXvdZaKRJ6Jy1FAhubNJ0/FuQh9SAhVDahbgYvyudUNIaXx/55x7u777lL

MhUdgmbx8+cSaGTGepVR2fmHi8sezw0VeiBRrxbN9X
yT1Miz6HVtmT7SmOedW5uS0jEE7toVZtltsPnqcxQkweeqkZYiRpRq1o7Tcfhr3PunDjyVgXuUA7BqA/

72wKboigy7Pnw8gEPtesZ7mVTgOu+eAs0f2UDn1iDVHJqTDgFDQJwt3+3vG8pY2kq42RMVdA+/1vZ//N

pP+BIEKXmKgikAcUUTTptAMBMwTMQP1AD7/Ms9sIdn
7DGlC2GzpIaW62hf603/o5qHLIiMdCFHcTCs+ggpRhzwwvtP1ha2n55cFsDBh2DS8kAG7bGarVN9/3Ls

nU+uw2c4ji2ch9ti7Ggo+f0isKfrq377aolp4bhIl2LWyJUSqIIms9MG/Ukgwul8WARpOipICF2EdN2c

VtmfphUk7IkbUgjrsa7MmtCoFdF7FwZW37iTAFo4Jr
fTOlg96cRWQ4yoQ5RUYNz03/PXWudXWmJxTH4015aDn1UmUVZxMC1N7reBatqR2oZxfJi+U1PZyWBI/k

svpHyoKMHIyBY9DOcpXIIePdQhGoaqnAF/dOoeBcUMPFmfnPDgipbLQ2WyiYh4usLtA2UK+H6qz02VUq

b8kNxP3wCvbjKZFYaEJNgM2GZQpNE19C3JNCZMFDUM
EBJ8yJTXNRrhuUYhAZE4SQYoDs7sM/340sG6002bAq2K88Jw0D8W+vZ7dvkmsn/Y82g6gwoENVwKTECk

HLkT8B0JpVtLyhabSlTsRDfmYDyCObxLTvwPOIIOcHe4kkDHmanaY5A2Dqm9hpo6ZGNDWfnwSfXrlpLs

4dUSlagbh9k9FyRCuAbnx1nWWvXD5EMNHcM/0zLefz
eXO+OQdkeptRhqFJqeM61MMYFVUuDtd3LVRMlRHSwvLzAEtiR08MrFf/wGZGLe443hSq/dYY53TuBIR5

voWOp+sJOmNUBxeWdqBmVdTd3TlR+plWo+4YWqAYVc0Lt/iLVcd+QqU14mybOzDtGY+LvIXI70e7zh+k

05rbOAW6XhKBEG84jI3w10oUMN9PzWugABjVSHZFlF
AtZ8uGID9IRP7yPuRzhNgApNrdRS1wUktFBVTyFS6633C4UEag0vWa/pXjThn9ciUU/K+jLeJt24v07/

JEeqAAzKqB3yptl/A1/4u3IU4eYBtVNvVmxFCe1xIUktsrJb8rlkUwM6Wt7et0W1UMLwZngQGAgSPtHR

222xVNB5uCQaycGIrn9CiY+hcbMw/yHn8bD8udUX0v
BTfwu5AGrb7d4vyCHcoUPgaJTohQbpAfDuAAYN3Na15dnzQ/8y86Q0yegf4sLk15h4NoaL4n3AMXCvkY

gqsadbIRDyFg4acx+xBb/D9lAcBAgHCr2nGsb8RPRpmXG9OBpXLfNRamUC80ctgJhEq8JzyPfEEXgv4C

+nzlquPp+CrT56WyeeR2+XKllcBzJ1uBCZlWQ1/Qm2
X3K+YqmIkTf/Ko/nURjDBHUbwnH4euuukirqkRJx9+P9W3N5NgPzOThl5GBjOAyRFLnBIAKM5fLb0bYC

sX/I0dsvmW5/cYQHrvjh0ojKCS0E1xXTBNnKarPLhEg11GON6qt7aepI0q60weggOqDZ8KHPD+LrrWcA

C9MpaYCzhA7kvcQOr01BIxyqq4oMdQXpx4ruQNq1s0
LoCGx8/4cFk7gwzAfRUzwJwhCR1CKTCPA7GTsrATJRwpPFHsFlZ9827NGe7WSOg5qF9w9Pkmd0I4gECF

7JBvcyPNmso4N/5dmaW0nVSLZlxyLg6k87QEM7KcoYdDL1WLQZtGoa4N6HcdzxKK8bkaAjwmr72Cywtr

qyuMx9oyJquPh0EA5wreB4xTog7bWrahZsdAKRYLQ7
5/p2MPSnrkcgohTvBZTHAdZN2SwTrdCLl5S2o+vs+G+JxIbhbg3os+SvsNC1/ehlruGhzoggg0BlAwwE

03e2OiJVKQ1O4UQhN/U/x1UOpx1X31lT+9LyPZ/YlK0Dw7Sl5yt5MNLhbmntwvKOJPqqXZFY1qhMHM/K

3Xhoua6h71LbW28hbsp3ci6JvdcCZUi3G+kFS5fUn2
trN8oN/z458ECAF7fHk65XrNP0/Zeg5PUf+KX17iJ1/exhWcusQGIH/vCJ2zdr+Qr06uHXWKGuNx8DCV

2Ek1NndbJW7FGjYWbpoPgQOy3G7UslkmbHnuvhfeo3vDzSFob3t3MhvGypVEvZgeR4USDCpNPDr4brf7

leRZeoQzMff9S3YtiJuwjtXUR9P1yqpIPKdgkRX08u
vTHVjh16tyRZ5ELg0t4NE1dBfPS38jufuy8mFu0Mf1eCyxpjfwh0IBUZ99K58EDFG86Oc0E8522qPSZK

BitwSM2pEWnj9ZW6AkIpbr32y82+w1+PavElnPVA8lrk5lcXezisJ1Kc4N4URh6GHdK/gaeWu0sCz1j1

o5+0R/pGhw67PJm0DdrqoRaF09RkPyeKJ4oZhvtYE5
xaxJopuwSyeg+lflAQKTjDHEavCbR220QnMCde5aD3DQL/IVqTJfBa6NabTTiHkGYEvnc+9+zZF93eWw

5Rm/WBImvX8uKG6C+JcVMck31aN4YFDB+GxWUcxXUrWINoRhoApvormryHaQpc7WHt5sGecBHkdM02Yg

nVxWKIvMUdD8q0bpx5O/nl+2s0zVlDiWTg3DOTjMiW
3JWlDheFSb2r6hd8S9tb6UhOXABV0A/pObKelrYTdjVXiuHWKpePhKgGtcZoztIcjhc/BktBkVfqIE5M

oqCtfrIgObbvsM6ln9G615omTpVRU0ASukHrsx9qjE0PRpO2iUVx5yuf19LVttt61XktFTTnmZYWzTMI

E7FXrOCZ3YLrqikIwb17OdrO/lsONLU+8gYK7hBeaj
8j/tXQxnNo/6ZwfWodNasrR8TlgrVAzlj8A8u8hs/xhIRTJpQcDI77MrMwdXrNof40GAzEfybhR8EEZ7

c4WAYLnTleVWq+WgvR6WV/pMX46j0Zz4G98DkOKasXVU/4fgK1atvyAWDyY931roRkzwBVR2/5ZJ5L3T

1rOSeQ0PDd+Yhs7oiJqMWkvWTzlVrBQ/FQiANQzZ0c
f5EZijka7ISB+SiSHTfxcloX7x+IXoN/1279jH6kmd7hBdabaGPTD8pJ0mgMUWx8eD1fTePJieXe2g83

alQ5qZKBFn3eGywy5O1sGIAhaJgm7mWg6oNzq//nH8j/bQkW77zn9db6fN6k8iJdZxtLMNrtYtwK9rhS

8Kz4K7h626RSiMcdxuL7MnhUr71jf/lZfa5TGm0IEo
z4Qcx+oQnJUqwjyS49ne8M0Frqm+YtoQ2PNtmBhwgep2AE+7mN5q2ezS6KTZgXuWGh+sy/R4hZ/9X6te

sf4AAbq3554vX4hyQRM90Jp2YAYXRFle5CD+iteq+TAY6BfoGjO+6mvR+t4IvlmrcYqzow8XX6/oWfCy

TRi54MPcahK4dLvPJ4NlN0SLkGtH1+9AbIrwgfAaqn
uCFquNX/pPYBw67ODUGdFT749xlN7mfNdjXnHfWJxe3znmlChBG6FTz40qDjLkK1WaOrDx5zmf14Kycc

B/vbN1i9sNDv+a1h5HkzQJYk+1M6b7v8BUeB0Zi56lHtrGGnjpmuDyiyH2HwxgBEou/J+jZu21+mTm7U

7HM2IAZdxxE5xj9GLHwZ04s+S93UjMUDDHfeYK2Mas
JNmeN0h1W+bGB3YWZpNhCUtgu6lSuAdqQSlDRXkOoxTqFPPHGSt6Kk5QrEbW5bIxxZGLPdJL1mVABU+J

OFXRP/z6xOF/TPZa191QMsazaoHZROZG8PjvRyz2+QV/kYlXdp50qGmAb/+71quPrpJX9EiPDxtiY9JU

nrQc3o0qLIfBSppgn0EhILhUgkDW42ywfe2oI/6iFm
QyNG7ofw0pU3UdV+gHoJFRnuYTVw6/7MNzFNxP2zYt5a8KCuFbrmxTEvwlG8MOt2ofLxAm/h1QLp1T78

XX69sKoR406GNWw57X+fXA2M1Ufgl132n+FlAlHMlegZkSBXMBwwU6b9erJN3mo5xI+k8MwgA3MuQ7Cv

jepxN0Nk2b+M7geh9uU+LIK/lQp0dfAYdVvFkDrXiC
DuCMyChSKQeyAsBcV3ZvAo3DLUIKJ3zjjMBJj/drGl1f2FewEJVjzsjM+vlk4MCCdNFAuP/Ou8qBNRE8

pcksGUiZhcbz8YExcPcRsugEp+En0d0/4YHOFQXxdvF3F+LFWUpVlrB0tcWzLBmQYBHUIfCEzVGvMFry

3c2NhPbZsHlHJuhQXl9abpurGfIkIvB83+BXT1Mmpr
Vfo/ljbat6iXniyfAqdc0i3dLG6BBdmEg//v4obyi6FoyGvg7hJnJMM0XqY/I2+uEfqIGrrufwfyvpUc

O6fduwBJ7hcS5gZqg8T5UViSPt5X0xM3jpjh9HBig0Rt+G+R0sC/qIVnKxHRkDGLj8Y0qQpnAhuV6j7W

c5t/BUNO+ueoYtW2XqIL9O/kS/qdb+OuP61ZJqOKFl
vEjlbTbUT0xhSnsrxiYhSMD87acp2FQ/GHuJOUpchLbKnO/1C20s5Jzgr+hIq8zCXU5HAy3VaqSxWoXp

SZKTEwIoUjT/9SzDd44XEb17beDzzATehl5XPV1zA4ZVc4i6IOFjcstcbuGXxV/2ue+VUAHq8fF72i3O

sAyZiNH8/lyBffVL5lYF9qcn7UIhx4/E/rDvY6PK+w
gbiqQqk2DBAwmKh4RiARnoeiml6lpWc2RjciHJmVvWGu6rtsF0bhvpI5DUWg6B14LTBaUVrlpRHwrcJ6

T/ee0S5v+wuHkhuV9zmS9ZXXf1pLuO66GXIeNW+KhPSZQr0bLNuJ6F5dSmPBW7Vb870YHWY1x1xu9vXd

FO9WjiGZdW1//a/XLfLqboRVIDu1mvoA0YUp1WbRkZ
/Dkd+bXJ/ZbL0HGOwJwfsR/T6DodcQ+l1ugbKZe73UJ+Ep2vpUQjxorBc61AIZSbiXRqc5OhVxiIzNvK

YpQJwEswOCySbi473rzQAMDusKlt+XKiZ0B4QZCLYXWZWutKrnEvk+Q6/y5W/g3xPs0Ep7rQaUB1XF+e

n3yqYqcUY181zn3DdpZYxphbyHWZZMlBT7TLjndzZe
4DF0y8nbkScAVJoJvBzlwMkovMF9S+oF4KFSLQP3u1PGxivhEtVnVUWPNtB8XXWc6ogz5I/WCk59fRXu

i+RAFKvVUI9yVVw7xM8ccWic54JqvumO87NliXSaB5U1Sos09zQ1g3uu8hK4oUKwcmQ3+3d/OjZjI54A

SgZi6uc3PIcfrvX8jsU1MgE+HlxchswrU7ybmtLyJ1
NE6m0ov9nfVJ7qKhVdPS803MhvzAohu93kcfwRl2R6vz6scvJHRuz+m+u4OUlQaPZjf3Vw2hjxsqjet5

l14uPCW4Z6PoAat/iGYyOmbl8kmQl3Y91126i0nSu9ySvUKYa6h7H0eVjU+glMl+UEZmcr1WRHPSI8ke

MmDRNmZ6dzXLfTVKJQN0EtchMUfWpYHHPpH3p5HjtL
sYiCLILXmBA8GTBtUHWtHFGT5lUK8aqVVHnrNKWf5Y99zfPWAo5KFP9B/BlcmTRy8MU5uVzsvm8TliZU

CsuoJxSmakYd+Y7qBlhru6tvgYRjtzTwANcWWDOLZ7zeTQ9wCxo/ZhxqwO2Zr79RJ8cp1CP+1lnnt8C6

7vaFGQnZxXPd6FXt5wP2i0yZOw8R8oJ4DfBvi3vdnV
xR6nGpDz1MuKTSX6oUkbUfhzCH1rcZshDI+/Y59sJa20APbRN0RmqJ1TuOWE9KF1cTLCuXd2OGHqZBf2

KwXe5hVPaOZiaB1U79raR+xufJU+9+jszyl/w12PR/Leena+y/NC9sNgT9pYb2+hsvRoOOEBkYjpy7nriY

YDYATS6PPvj0xdk3teI94jEGgFo36fPnDrTBA6SeaG
uQeTVGZJSPq0hb444aL+k0j5i8pDH3LFaqGLrUxGaEl8ZIZNEM1LMbN7Z2Fx27fGjGw6O1svxfJIWefW

UcNLBUa5WeP8VEm31mn1Ad+HmKDP09g0bKhycBt587Bv5oUjhYXwfN7qqlZzf4YtToBPX5ID+f7e6Q9Q

DqHS3YPB1gmuastyrv2EKGrhkPZrxY0lCY5lP2/lID
0OygfBTu3hqFke17leb90YiQ/hIONPeJ44l+ItM3Ccea0RXCuF33qrWEKL+rcL2Qna+DZzSeAbp+/OV2

4i8Rzgh+FvfoQGLkzXJGSq3s9xBi2MsOMy/aZ2LOkaUiefqoXTv9Fe5EZzkhUUuo5OIM4Q5HS3P8tAAk

GqFbf+NPNWtKPu2WuHHL7wEFQFylBWCAtRNmLzKUO+
mNVwve/S7b03XRZKzKoFZ6etsXPY1SRc/MzxFTKu7F4gTT/iO8mgB794b9ddsDjbj6agAfouh9Si4qFP

sMTmPxwRRZVtT6AYKidPswXuyXR7zOISRvwz+M0M5Yvt34o3+LRL8v9f2PL+JiaVB0yZhU/a/B4F56YD

kjLbG1mhSN5HFAwIXGkuEmWyIHp1J003cakADVE0kA
CBMjnbe1owgm8K33IcafXH6TnuMHviJCIKPDgsHJX2BrTydpVtc5yy1Ao4IQKhr3xcU6fGHRziQb0dj3

/N6xSQLh+1PZZeYHRVaPmS4PMbb8s/WC9xWGk1i+Rhl2rZPeJOa31I7tU8mB8Zd0eFUeA8MYyFbHcEm7

UR++2v19DaJ9dD3hRxInzu+7YQ02f9WhyjjLRBWSOn
uSNQdWzzWTSllWjHSOufdv5j1aQIGynsl3X1cktI1sbWBrcBkH05Vl2A+WgpbeFwAhGf8LqYA9hOdcRM

DrEJGlO4U3rvvok8QxhaEbxmde0JR6wnJAu+kUDhVxi+SM2Ta16FaRO07oXoOxfcTqg/9GIT9bc9CZ/Z

TafAzSx7pIU3R5C+aelTEYW09NwR8MlX2gtif7DIci
PMPDEoL6OSnJhVnnFwlgqyZdqZR22ggzyvCtpgjNa2v/PR2rYrsZV8Vqz1i3Dsb36dYFRDgGvx7ztdiy

QpWtETZxzFFcvcV6OXty61H+m+r1zdFtGSgm4t7CbOMC8qpxQvBn7XMsKOOA43ZN+7BxBlcdnsptMhIb

ndylnMhAJ0KgO3ozc0GzF4DYF95LzLMtVfEZymvGmm
6R2eRId43J6o9NG6QUeLsZj3b68GgbU+AX8KT0SouEKzB6IJq6VEqJd352pDjIH6jBr/1JZhizBPep2C

OKp45LDqZF+4TsRAkIXuCg8qkABk9MZvy3J/6YHM68T0zfzMdeX6nnJV5eRNu0sHzl5o2F3O9vBztMzN

OMntee8H2K/xuiXH+C6H3eTNSIHrzGOa5pdbcbOJts
VV2BzHPXOu4L2h4YxNWItaSO4E3h2phVk1dKixrwyllC9li5OZdd9af7OZxy9W6XOlhRnrxGt+hKen2E

i+6Ge0vklwqidG0JHgfs+6fDUM298mNlnBna3rhEJ4H26gndIW/MCbe4sfaWMXCUtwB15Iry5dJs5gUj

zyKz2hhqW3AwKKWzcO7UEbRgU/B7SrqNaftmbAEbFh
LQcdEj1q4H6ZKR9NG9ea1BTgNYpjqceZh6u+mbsnNzDgjodIkOy3wJQBjaUACUPUoVl18n7ThILK9Tlj

2oXK7bwYM0ct8pKf++nl8hiwC4FxKzVAoIX31x+4E0A9yvcNcz30drQmXWqG+YIexKbgNrLok1+ahPnc

TmvwUm6XG0ohZ0L6raWFnESr1zegxAQCYCV6Z6LWs5
0kox25TT1dVsqHAtbBiEYhLCS0GWrnfQwu0QUlNxASwctA3wb7wDxbaJBljjdlDD0lNYbRMal0dLlwUh

7eZrFTw/8TwgYAkMadctJhlslxDdAE5tQ2+UEYYFP+uO8JwoLOEmq90mGM1hbOuofyHrIctgP8NTF75h

4y1ny+45r5BN7DmWa0ilKREvopgVSCaYGtDJX7PY3c
qohznLh8gW/G5714Zba6J6fkZ50QOjb3HPNaRYt+V1ev0YD8fxnKPcafy9DUD4Ohpc/++91kf3U7yCdw

cBHKb2HA0jTBHpcnyoAFf9xOPDVs1vtdINLsuBsja74h9Bn9rgqWgo+BjhhpLWpx6cD3y8OYA2mrsWg+

lu5Yeu/9q/Cb2f2PlqrpkFU56+dOGkgmw7AvDO5ZJb
4dvv5sTrMNTUI54RN0qJGjJElqtae48L47ahi9w+lCCCPFLVcYaMW0hksWPyAo5sqWpVB9tbOVmJ4KCk

iQ24L3g+/xQH1An0oTjBx9wxpwl36Ome0bra2MkhJfNzTJkc0uOX63FLhUnRRKd+0Lk73Iem4z2PO5Pf

lRYi6qkr+DCWa7be7L2zYfkFv1dGrEm1RplcR9hv/d
Y6JdDgkeUvELVomdUjYdvTriYH7mepKUhfaqW1vzsszDRp5QZF8lC14o/vtKmMP1Vrn0P4aYs4nyvPG5

dNvRR6mY/xZy/a03/GaAgHz3Pmhk1DfRnKNU07vcRLMTPvEe+kbEQl3jioL6UR5YchVtlx/Ck2iVNmT6

5cqVD36irSwLxlUwflw/T2GcNrX4A9DvadVo8dxzgw
oRPPwOFU/eBFQWHP2Bjlcj3TX+QOTkibsD2JqKKl723tG8KxYB1Y+eAMlVc2jOfeEIypBBIht4bHmRDi

xnrJ4SfIrppmXf5ymslbh1r47U8EszTJYgSVDiZ0hVZrYgSYqclI87Jc63EPkry0Xt5aZRRWPbRWxYJ6

9cR3C1+Pthqou+pQCHdb/5gpUm9RhTA0s5pAEUbRwC
y5DeLReetv1+aT6ws7i5V4UvnJXHiIGp64bHhhJqM8k/ZvFVBfWiBKI/qCrhfp9Gavi30gN+Id6tQ80B

mqyF7ULN0jhGi2G1XIOGqcCvaIt0rW1byswLqKx+hkcIWgC4D5HViyGRRa5QclwZwSMM1NtBBouCb6/Y

QmJOek60rThrzr53Gem/EFu/CkZKiY0903bga+XYTf
OheCJ6dOJpqAzw1Vay4bFzM/7lmj72fCW/A+rAWUuw2ZHacxGpupK3YgnBdDb3kIhVTtdy0ccuPraaU/

Y+2v99FtfP/W49iutk0X89uTJSqsX24sReWeBGpxP0Xj2NJU1VtwSoQigFyVZQ9pte3DsXhiCfpo8LEq

/+FrNTgW9YETkOc34mRTGRRluUIs8jOL5FYfSXIfhE
cW4wvSo7M066Qruwk27SSC7E5M2V53znVblKJoOEC6OXRw/U03tKHnHyKGojNAlbJ4/N809IVUmr8pHM

FG04SAt8KedP1x1rlF2dFYEkp7va1KFdsvcIg4zKTJru7dbmB/xxq98WXk6EbAEoiDpIhcwuR8d/RP5h

zeFqENhi4oGaJxaO3nJmMXcYokcbF7XzedqfYqOyps
vrBOMjf+qVzL0jHI502VZ2B9QqZ0UfHMrqa8tw8hn+u8Dtk2JtA1om3O6ouKNEMMQuvoWcZ4ya53HPdm

mxTWiVsqdscWqx7ymiex2WzmuYZZ//yvJixQ+uhddSDPJReVZBneQZStXi5oW+ralg9QYETuZfodMI+x

Mxk3rgcSp2+LMx/bAcCiUAIBKwLSX4x21mCz/rBusy
ttoMHM3R83xChQJcLa534mgvZJX1KlP8BvkxfSOztI6zI+E8UP2I0zs0zFsxDhDbzryTxXBEHB88q74J

bprX23uiRxO69DdPwbUZIl2eEqdgK7HYO94Q11llqiRwedWW7g87cNEMIzqJ1GZpqpbU/mVelBsjuIw7

xbWljXhqAxZxyY3Bd8iluOc7Yk+J+mA81tlZu5oT+R
pReS4O0SKxjvE582pIToTKgINLHuSygBoOwS93zVaayubKZtYmwc8Y1Odn68ygML1Rc7fKAhLau6M8VY

iuKV8VICsII0qfdZgksaYRg33XBXVgpoPnQ82VlDEliLo2Wc53FguBHP4yJN741oB1J4+Hv/Wz4VFBLU

tPc80KjFocIlVMPMhveo4vkL17MhU6+SQu7HZsxRYB
z4bkCjw9ch4GTozFk5yohxEk784kdIId6XHpngeWcl3fUAupw5J8ovjiT6D2jEIFE8oVyZMHGz3AxqYi

wMFjaj+hJzVY5MUCL0tkYd4NCx/fZQPPRnITzfxZUD9XygvloeaOw0iwxfOJJLYm2+2qfpErCeXFJleB

KtJRElGspDlQDkNCPwpc4geSPntXjcC5P1fJzXRxnk
DvgVnCH59/++Ud8z8IRK9q7wwXRZ8lpSnpzWrYeN3n3rA4Cwx0S0euy/xKkh9TjhfnA66UTqPt0YAcsm

y474oGYXPNRBaaygd+SDXoffXaFpcbRIxwZvl89D+VzwGhRGEgOL82jFt3tArC73mLDntZn5sYv6xY/d

PktZbu3dg1tFHDw4Uj/TtRaRDeDy54TC0cK3VwvHmR
zKwZ79ggMkEmIK9b8GdtUJzMnAfcgPjYCiT89MTxJffSxKK7LdZvjRRjbCMA0X1cNV5ifqYVIzSIjcMN

9ixmYf6VTi3ctha1RZw+NciGYecwfKhvuAgBbUtP4v4VBoGwDyv2qWSj+ohwQI2m1+axIQwpxY8xHadU

rwPtMGJpB3v2VCpxyemBtBrL3xxtNztQ+w+dkTQsgD
hJYh2C/+Zz+oFdWn4RlfYrepPu05R32wzb2M+Ta50daJ0Q5OUlN4Yj4AiU4v+ZD1E5cd9Y0ncYoqOim2

eIVpWsjY8fFR4EJR+2BGyvl0Dho8OQjF6A1gdMj2R5gVnOY2D0/sywOXqtvWmRKZdmFRDmEOblRJvpH9

s0PATtSjyelVbxQ39QTWbQuREVmTM/Q7yatxhEHncg
oKM8fTq+NqfodoQdHJidiVqyYg5eRZ3CowZONrQ0qAa5ABJv3K5NUCgc5Qy9+iBcHVrPvFnYpiIun8lL

NmyQL3THeUS64r1p2tzLPZlhx4uEjA/bh4TCan8OcvL6UwJKJg0emTlzgLrSDtLr7cgpTjJpX7r9+tfu

Tdnra8ie3Ho0T/gJdlgLpQeCnDn0KDKKd8KFhWV4nL
ugKh2NMf4eI2S1bO9pk8VAMdwIsAXooUvfnR+o1RrvkfaRZXVDl2vPj22rxoEO2+MkWKkOqficcbyfi+

tINb4cxUh0Qg5ETmVppEFcJRl/6zGf9jSfKwoPOGNrox5lzEwh9TxHnkJXqMy60Xgc3hI8Vt7CkCtw2T

MEdEqDM0KbOMKTgOEqVu6ZENTyORUHMxJBwLmWJXIm
X1p/ScKLk2dOQZ3rmWRN7efXNGFd4lWFCN5daJ8U1YoDVRBz0DsY48pML4sylKupWRgolq+4qsTRFib8

/LIsvxC6GvNUKsXupvC+iyCQqRbL+60mBibKssZqwSYQ+Wscu3BFPTlVzfdfcCJcWonOeo23jCv/ESvo

HBG1JEJBZkAdM4Q9o42LhNKDCOIH29BTUhCESN9fiV
lf5buxBVv/EU7TVlfEcR/m5HY/GWXVtv0Bf61ybEJR9vH0tmzCQf3GVds0FesYs8/CnpiXeCJOYVdQAG

O6IZNzJ082c8zAY77mPKULfqbEqIoc0JumcAKfkOHkLXOWb8jp0Qe9mWFwOP+Gy5/k2VMT7aEPMBrgm/

LsrvtVZLHWGT6KMCLmHhYrNdOnnTSj2TnLAi/R0WGB
TytoAyY0EVS9X0u7u95iYSIqCU49pNmK5uKInre8hpzI4WctMVTkBMi15uBUOiMHJZCVpkUmJbr4gqOI

KdlC2/389P5f8wO/vPBblpnMP0DaZFPfhN6lFYtTRd1ce9heW/6GkKx/7aBD+RTmTDQPTnckHNP/iB5U

WJ8L4VHVrH1N+8fW0Ey1ookHDi3Ooh1RaMQsUIo2Nl
tAmWtC4DLwtkHyoRGxJNb4EEB6WYLJMo49fa5tb0W2/LQMLP2G/sda3ibFPIDwvH3EVG/8VtYoi+lILP

8utmXBICVRypLuTIcps0SFZyBOP9bmwSnMxFMGnk4w0lNj4t5u/TZjbDosFJGBpIBAnNHd4al7xHcfWP

B3d666Ilz5o91VGoA0pz0iHw9DXbAeFiLUckz6mJ7N
j5r07m6tJtf4uD7bhW+GlCRp1KZP7BVh5c1R3qKVjViEGEd79mffddgWmdsVkRd00PE9H0wKotis4c4X

w7OP8lUphkFXXaNaTmYKUBjfLWJRTdaQD1MUZan5Ds7wB0bMQy5lngEvfYHQyvnAWpcur0f2uZwf/1/Y

NzqKVcs2uQE3cO5eL2Au3kJMxXZt/k9q7nhSkzd30O
6blozeZ9t7ny3+myPpfMvjYqV9NFIp7uBT2N3EXZCXFLODkCA99ipZ5gDVkLwp+g53H/OKgj1rZazAbR

AQuY0x4Od23Yf44hMG6y3EZOsAQpJnj9V9u6rVMtawCBACiMukbc4NSi+Delilah/GjISLYHeTi7ZW9U1qz6

W+0Rrqsf5aA8dIX3eBpNwNRbu+GesAnH0Y56xMW5ti
Uiln9Epjq1Z01Gfpey2Ezl5QlMzOxO/CxEjezEVGCiTwUv7DZfzRcmlw7coZngTRCJEX9iIVUZbs5LY6

r0I3EemE2sL/vVXTexp8vp7Fc5zcnFJzMh6cXAx2Zu8HKXRtcAl8xQvwxW8KnkXB59RZJOZKh/5lx9IF

6qzjbuZrM5+KT5fyy29UxtfSefPivL20Yl6pqc1Xdh
oomQ/wUiqJPG8GXdO+c4KJePoql/UnsYzhjUB7uqCjrKAx8ikEaslLw7biISSz1whrj6Qhq3jdeeVHhl

EXrnGR9VXJD+MV3NNa/FwoZcjJuPz9P/w+8BTYMWzqBWayholza3vWMymuGc9sIsnyqLt5897D2/Zg/i

a8ZNJlEBEX4uifpR103mZX8CRYXtaPfmga1f1ntlgf
f4khhrdujdhwlQ55iwpjB/oXpctbX4XCU5v3ls48D2j8o5Lm67AUy5uNFG0RKAr+DLVvDGI+pVBhaUzp

13uakeyCS+tAlTpKWR7xZm+Vf0NyjS95oEdtEMw6D3g0trmsLLl1d8+fOdNj+ePk4CcE8x3JvrWP+zMN

577etklJ2amP7Zv2OAsPHSEEfRunWFKHmeOoEvTfrM
zSFxBK+NZCTZEYsSfXSGIsjN2NKCZeXXxkx6gX7gzTk4TqvnQmN2yAGqUOlrHjnSBlw4HiUCuFQSF35h

/TZYvooNmaFL1tb7HHfe04C0hUPbCkETAYLUzTdw1MNoVrjh2TRbe6YU+onCT/iWbPy+JU+TnIZtMQfk

qAJjChJtOFwDaNbyDct79DpuMR45rZ0STMeWibiNR7
eHLAua8xweSDjV1T4ShPCrCrJOlUql7DqwWttfxpIGPyrzRT07oLeNY5vunRk2vk/hkfG4axKzx5Gyax

Rmk46jKnDZPis9TUSyjnhIOLhXaYldNUfIwAfE4A54Lm++Sj85+ku3L33fDFtHSje9JHu1mK/8TpSRLJ

JnwrOqY9pVRco+KGTDc5dUiP4dd9HvOR6+Zh8TmDSX
y5occljn85QWD9YgmQm+h5E/Nh2KZCzrnIl55j3Ai3sTYdkMfUWcjYYOlTHM4IG71P+Xs+jwzxaEgnyh

z2PPgB+Etb88QDxe1KUofitGkWmisJEtVkmKlRPeRcNyRvXshXr05kTF5xrXh7+pmgpaAI6wiFkHk26j

Sjg158L+VuNqZ+cqRUdjCgEhsySlLO6SGF/xto4P8+
lHQP0nQNp2csAkPkJbvRo10gXGwh226aXYIZAMnm9wu817dD3coQnZ40FbT8YndsmLD1E/jCLctSJmD7

SF7lmNYtMtr4ZJudTztlY3gqkLI09mAnvX+0Hig/h2bqPCb4igzbOVqmE3pb8k+CdeLBOAsEFY/80FUE

VNxw/g01stb5V3AxfvHXAIJuzi6an6lEJTWV16X182
U8rfYMx66Twh6ygiMpc9MFMa5E0Z4ra6+OaP9uQEc0P4F0DKP3p9kM/VqEDgDK/LmcP1i5r8noPT+cdC

8LPsUVvkc0GpseYtVPl2Mz39WZjPruXncaqchem8TbDVpM3JrAkqr7DikITyV6IYmg0y6VOElZcAdPLe

1TZqR5sFST+8ZNe3w4cQ4T0Ql21WmHhNe/la5Z4atP
uioSljsWIFSLzi61IZfKuNeQ/sRxOrDVZwOO+HUott4WJcVi9591jZdX9WLhIBAfrBRMutRLBjHH4DTC

bc3n479UULGto0w1g2aoRtH4lrs54Jl8mMO/7nLuO5fhwxxZsAhaE/bicPWSNo7dnJLl4+XYz3dJsjUK

6jNGmOcLRUSHRgYFhSfG2PTBDp0F0gop3dmX7oLpEO
Hx1i2awtyt0oNjY8f6YXieCZnq7bN5Wajto3Sp4uDNaGTZ0owADgx36Yvziw7GRtV8qp+gQevmBee75h

v3kAVNuipoc6jFyuoNr7VqZrlXGb0c0sSbAVIBRqhF/nq/3OWDROrcWrfNjjFjqJ1XKwHNOX8FA9eqPN

TuLsm2dEAokn6fQVgF797FvYa6fXj4wWBdDoVXQhz1
1Gxw/VGCsbb9sf4/pDTsU1m+X8EARyxOUky7zNO2Vhgv00XsOMoKQvuBzkKS71c3E3EdCJduHpIeb4Iu

9HhnRNtr7Lp1mgrFUQfvnwjnwcHBDQKZAJKZrXJBQfMr1i4uNrRXFKg8n9wxdMHQkie7eFyD0l/oFRV/

EAX8qX8yjkmynegeElUDATsIS8P2EVcW33VCVN1PTN
RXlzP7rk2qb7tgbXbUx3KE12kTClGMxmwkst5ZwdVfSd5L40QKHSTcH7/8z4RdAW7xuOj8aIynQ73jz1

/e2FeSjxgAOoz+sj+rPfnX3LebStWC3mp5T2GQQS7g5eGxtx/19EAFNLW+m040BUcX/+5OrO/mH0x+c4

EIEMPzR8peXU6HW2Qdgeea+kJej0PHH9bSspxyXBd7
Nx9Ab0mirKp51czmNKdX3YwhXVy9KLARN9ioYoA9GdEEGHnMTVpsCgM+mcJsw8X7WufMIpyfmG2xWuMV

Zspm/v2zOapYS+xGWSdMbr8xJ8106RmWW1mI9YDuaz1gnQytMNuOwIONyOCh8oO5uF+Yt5I9+68788v3

wIRc3t+WvQ1eJICglSRNGlK6cLoJ13D+34Frhl43LJ
sKUjqQlu+J10TwoRDgbcKKqpklZAOMePe8B0WVicgrESlB8THSarG/kQkoRttVQo9SBqIrKiTQbLMCf8

YV3sDSWd2ZrpkmhbVJi0zT8XN2FVEFScPUp01oXQVfCd2yv4aC2VxvzLsCpH+4uCi52DAwhjHbD1TKQ1

5GkfQXwL3XSkXFBb5rpePK4j4OKSvYZodf107ilyyH
VNQVXlwKH+CiyxW6G+4wc4CQIqBZ9a8kCdmJc+zY6u1zuBQTB74bfL7HGut6ZgpF45cs3vv7unoVkm2E

jSF50pnzDPAGJYXKTeV1goYkEv6haXZIG+aQzyY5K84qn+1Q37njKg+3otcs3BNfef0HSM9dTt+pQ0nM

QcVXwD1PBklAXqJCnl7lWhtQss+p7zfSMNVl1GOrBx
oO7gu2dKD880fqweRZZLi979t3cR4+eoV3qk+1tF9k6fAZmJlU4WRJzXGsYHC3Z5QaCRtyAEkvcMjlIK

x+2ONmGWH49QgXGPgdCdtwMgTji/M4M7W82IyAF42IRh/spNtQTsOBLVQ4E5YtB8hG8brmdnO3yJXcWO

HGFvlemKHsxcMhZI7GaoH1N98VwrTRhedTTBVl5B8e
QdE6dx8J6d9/Cx25kZv/u8+Qxd5IO2dyqgQ8Qqq3kUYibWvp9PpWpttuLMz6eudCjc8qnQwjCpnIsCJ6

XbRFIFhdKTfi/4Pf4j2e+iYPn5vljZ6nEDEGKqWrlJ6IYbZCLOaXXf/giPgA3qabLUvx1u/he35Bv5HI

GVbKIjiDn1r4eJO3qsjTA81OlDxKccUwD5MTsOqLsq
+4+8NEGkWLKm0Pbbd/yVRYPWig25Tr4WFAGwRTpTs6jont4C9GaDdyp9hiOHUaa4V1orLV60aAfe+E5p

N2gBhM88X/+WOnQCS9prA3/Vn4LcTLLIlSUP38QtdOnMKOmkQClYZI+hH/sFoFVmwpgDoCn7OKrQoIjn

DW8cuq3muingaT+GVy98HngRMgHPO6w/6ihp+wMb2L
4A47nRw2WAuDjbkuGy+hjFl8aBF8kWG/JZlKAeiPCHuyz3EsN/NSQjppLvv27+jN/jyf0lhPLnqq0x63

35vrKhYoeyvvL5o/Yat2DcC+mbpR+ALvIZY53lMo7xOS+xPBs7syM54khHwvvMOxuzE6qGovntmG98g2

Lgn7CTFzRiS7cRCFXTriJ8Nlin/MsAI/2SiirR0WpW
d+x/BkiJ7m6inDv6pvaOueFnocgx++TiQgvtZQrmcw3nZSNMrH12CS6gYgnRwAWKTg7EtB+I0G44ukPc

hEvZYSF9hls4mdm6+QkI9RJumfueIyELsOxapDw7isTKail4/Xy5TWdhGjWEvS1eXwbpmiylVPNXCk+9

lK4TG+9brj8oC43kPXmZgLTc+qUxsaX7h09tPvDze2
HGt3+6QYeVTsL+QdglLAsJPC3VaCg3EQwa7pnxRNcTwlpZSm7ad11/2pguBPIILgcTPfQ5mYAmFGbYFr

sVDPVLfss8LJRcWC5ms9g9Vw2GTs0gvhvKtq+sGu42YQEhOPQPcpcKrX8VOI1+pjGNebN/+x2hmF194C

kX60GrH1oBNl+QP3ajTxL4O7en7ojFqKjx0GlwurpW
xYpJDEg280eplUfBq620KrIvdvNSj6hvBLmgJqnlLAwAvtmoWM0mKCRI/o87T76w8O81N8Asgjk6uknE

aICGzVea3+Cu2V4BdsXbzoZzqaks9c5B/0i4pTbmRl24lCwMk47lU8fmoE08e+HgDYV+ubjA/6AOk595

BV3Dle2Ck8zqysguft/fg21++e9DsYVPiazdsJ2zlZ
nFnXp53b8KJyOzKFhly55MZc/ra9aecOMkTWs80j7z98/X7CEYU6rCMDik8fpkCXsJpBiI/uuIscTHl3

ybYWb5FtBEwkcGbJFaMSKq/4BshRoW/XRdTe7SJlLaUs5mq4V6LJFP5c/3K+LbUVv+1q8eer1iHx3+ek

l6B9NGrWjgYPkerdh+ynsNiUYwG7LRlYG2tI2t/Ujg
jEEiycE25k4jpgk02KH4xGEGs+VpqnzuGdkblI8dbxhxSMwQyfa5HmVg9rFMCaI84Y0QQDFTPnNk8c4T

ecRmp+AZxu+belPWZTUR847jxa3lsO3Qc64tJFUq5+F0QjP8OLD5FIyW03s6umdUCdOkG1mTWh3D09LK

qRl7Ytv0FDLzDhgRbS//CB8bWrjtz2NAkNPL0Hcwa6
9V+1Th8JV3+aLIJ26Em8+m7o53jBKUg0BL+qvJn4ii3nteRsiqzOHqX/vxpV+lHWPfBZX/tnDzk+8OLW

umGIYSSrN7Xoppi0UkT8XhRGkGzEuE0NYL4LLX/V5rhPLanP3KQbvmILuFEQ9l3FypG6uoDV2wYtOI0J

5aL8s6XJgSjiaVNdFTC7jLXJ6d6Y/5bVkhrVG4p7v/
E5cHYS7totJltaV80AcONYOzQl1k6C/U4WtKpHP8WxIimqlf499jPBFSzw+K5QucQptrnhdlmv8Z+yZt

CJAh7b/lzDMwJsAFzl+WoSC9w7+K8si99Ov5lgMuj4gQ44FjJbAo+4fpJWyeZswZslTkTpymBYEN5BG+

2194EuR6kosj/jQatWZ8aDFV1mtYmkV96t/vaXLQH7
q5+OKXSuqevIbrpeD5uTWZuL03gzz0JUgFX8h1dLHPTlIFPINP6wUvdjpMK4EOAGLruA2OGQfqcK/GbK

e93UPDvzBFvY8WQWphKlnPkxFbKiMq8N1Zt/oY2OUTkuZ5wpFIaJftcJ2Hx6nI/dRYGfNrpvKUa2QREK

8qT68B4P7f6CGMNqKjOjQfce/8xyAOXi8gSyLtFIQ7
T7CYlG6FrOoTVIZSX3+qe4C673SCpU18yrkwkkd11/m/ye/emOq6jhGDOmwAZV5BeoR7IXoltfo25xIO

k8jOMMgdFjkCugtMd+lKNauzb35GPGNfr1wXnNOnNcSrpP1ii7LDQx1xR7REY47u4N/0lPqj1Flxy5si

W/CE1S3NJRLctKR4wmSspECWq0X09JNUhqV8OqsxPc
SMFpetLRNcUIP/+3D1NK/CTZCPFwtPaO+VsQri9UJK0f5z5q7hSyxCaw8iVtyqjI756Cht2eUeQlNksI

tVkjnQLCL1cQssIjceegJWsl8ekR9aPjg2DU8GaF+0EdgdLXPc808vuIgXkujx2L702elB6Q4Gx9pW0H

LNfcCdICzMLJEOPT0bBsmDFXSSrnCDHdYD0Web12o+
eE392yF7J8QBrKMGOR8GrI9l7uQdKBHYaf5ndOxN/iXtU0G3uy2DV0xSfypAUmOq6yDYmFSJ2ovcB5Xe

6XIjaA7FNVd/iyFIc6HNE7Sgk4jjg+1mH12wWOM2elqRlKEmnzNxGzR5tZ3ylARBQi75XF7Bpw9cnI0L

00DYzGvSqGuQFPqHutsEdXyanmkq7R/BenzFPYm3XX
4esQ5AXml7UFLMIZB5fYUO1MIkaZhv9mfE+bvwFJ40GEfcq1xd0zQmgfe1nPp7sJ41ylr8wxgBUztNpd

3gx/2l3MWqafIMXqS0FccC2s7qPaANaizhc6NbSJSD/e7VTUOyOHX3MS4/1qNcU/057EtdKk8hOpDHR/

hV/+yATxxwI1FC5M92I9veImNC3YpWXUslcmqXzwHs
/6Ntkc4a8a3+AygktOjDJ2mPR0qhitpb2i5x8HLB1w2D7Lz6HyfFqpLlR5VGl1pvIHsE4Pl+G30Ke5E1

SvGCpztIeEC5qP2CtLGxNFsvlrflX1i/hZ0X33uNNyFm/NHiyVK/sf7XYhRIEKkKVVMyqk86h/S9bLzo

T+UWbOodLY+DkCNGc+ycu9muM1OHVB1wvIqiJb17ox
gRbb3GFuNSjl+/r3IRPp+hWoVIGTJlTsehryKs48cQP1TyGEOtPeYwfcww3w8argmYM6s1TCcPyYryTm

pswmzLKpRrstVJ/3gvpbML2TLLkMSCVD0VEoq+/yAm7CcE50KpOP2JlJt+cKS4pEuO2i2MOZQoULL+

gYUrrz/wSTWhN2aHECt/wo7cNisqgponhHCyMg6zmK
Dn3+PnZzEXH+VW9IoPWamp8z5QR+cpl9FvfIOTxNNJJOSUJa+AElKFKWnQkl8i6TT9EjUBMS30g2S5GB

LLA9nVh1sKQXhvwETQAMVPPiQKMwbM/i2ywOT4ARhzE0epsbpf/Hn9b5gxixJNJ8YQoU31jL58yx0LDf

blGAlbBMDdtaNsvmT6dRkv6WwC4jpK7Fp89HVpDi64
oorln/ME208GcQ/e/Rap8l1jXllh4bTByK0GtTtArKlxSKTPP6CHv4D+LXibKQTwSWrXV3i2888Jfeui

V0BkRN7f/+gxqZpYniJFPjLPEd06hb0hlXUFwAtFFQP2u8dyH6b6suooHW5LayPS/8jnCnn7dOQO9Sun

VVlhdr+eGl7pIoA4I9/uxEXDPEkxtz3lx43C1rL8bP
G8uVf8lIBfgYG449Ktz2ATcUEkBb1Cs35PFpAqHt2Bk5uFWl0QkWJ2UNiseDCesu+wUl7t2roWu/+cOq

ugRbmBGRbLHMelFTXpX50whq2FmynnO31wY1mr6MNAF5N6hNa5LloMx6ssN3imu2U9vUv5L8GePNgxfm

tmNDva+8YAq8phDAMhUzaaWSqIcCTuLudEP60EXA7u
bQM2VGbXsT8d6lF7IfWkgQKDkQD3dj6lrrjoWsJ5+kbpnueK1W3nPOmG3gXQv65WMYLSMSjDaRlziRrx

gv8Dh6g9rN2KXOzdAj8eqJggwWPrADyPhG7Dm/Y83p+4G/Q6dT8Z+QMj50fkvrwz4mGBYmuY5JirmAdu

Wr3weTKoi0KtqXa+anhcb0UQ13pderQ9X7ZowaqxTd
TdugQMeVTcvRAvi1s8u2pPU4/QjET8jy0Sto4Pd/x3iiiSFxZCFTsV3YZUq+lH7NeeaEuyQ7CQ2tOcSA

pSWXa9EPW/xySedpJTbUwEhvyrOS9xm482mvnwqyFQxhD5Hrfx/Nn+80VeZs6FlyuNbrziWyAvfukfzy

ZejuarpgginHAyuJeNb97LFccresAzt0CJW/H+Vh5B
jFHQ09zzBZredtvzYuA1TSlQiBsk99IVb19n9lSgvtpqCvtyh61w0w5Txqb4Y8N82ZwRkkkFEx4RVJkF

U1Sz+/00cmuOuH3acXy6ca+caIDfBh9FD8UxSeLg5+9aQJt93i601AspyA0CDnstmFO4yaHt9iNbb5bW

iCDPjjJOoJndnzQeJJgHeAop09jZbr1TjPtjN84RLR
CfRtlNACfHeGtnAGrig+5PzQKgh8wLAUsiQ6Oyc4nm48sfMnD/2rv5gxhp8yY8J/NLE8A5Vq8QYeh/I/

avHfGLGLIMSQUCP0oWZVs/RS4xII5iL7PIC7yfzGEqIWn+HTEpiG+eLgbCQDUQFpHn0Cr3H8iyfnEC2b

+OO9f6eQHaIcbuuSjcPzQuSUZEMysjvyC/ORgp+UR7
OslAheTH4hcP7iSigtHk3dVpNCwG4C5BCobtG254hI/jJm1/4Of7mBQbekGff1eM8fo0ivUEtttCkD6C

9z9P9P8hw3jIQOGAlsFv5mPLxV2OEIm3trqu5WZ6W/sa7TqAUl+lD331N2D3+pVU5qfoyryPnk1y/3XW

0EUIRzuXGDmL9vSJry6ZwBp6HmW2bwMoCWkgUSJHpa
UaOkn1PzyMXEoLoM8McodNxnZyyGIKqwrUHq9vLfjuAvvTV26dWCpHoeHvZlZ9dEHYl7NjxXg/H7+1Eh

DbK0ljpeBQCeUjvcNmWtLMOO7riuzwC6q74rAs2k62NV9jQOZ80D/xioRoE9qUZE+Fuz+JQNJg6PuQcl

U4KvWqyO9l/5OqFJXa05hauKFSfr+ytRPvOJxFnmvK
YBy5wsdZxq7z4BxarHO5qkKgNESVY8szm2ubNHHIg45GVbCypnl8W4ulT36dERGaUjVts48QBRPTdIv6

0ORZTIrXWUH58+rx3JrWi9c4kF2kjV+2Cez+afJEBFxJutGtkdO9YSSaspzMckjNkmZD10nEaJd2BAdR

GuV1R/mJ4mzP/+KBeQAV6s6KwGisyY/KzbnUmQ5Q2Z
l7wCdC0mMA09d8IBqYHmOXLb4HDT0ufj5u/CkOH7uBNwQ19Xs04BMef3L/VUFbnJdWODLLJ/lqKsGchD

iLwWPhGvgnhIb20Gr+6iZqpkkyiZdlfugQ2RfXC0W+PVqxzegk07QwxWmk7vcGkBgVK1yuQfT49+/aFj

bYTbK2/U8czAseOgFrab9eGD/TY7E1B0oyfweEbN2M
AY4aqTh3y/b0ugIk4DGgPOQbJaXu/W0DAy9DA9ltgrkhkFw47f4OjHPxYtAfc2ZtTAzLwm1zezX2pxvA

gSGl2PK3P86Pdfa7dVcg6fVEtERFaRnXzK7K90IZl1/ue8VMDzpiCbHZRxrr4x1XRfrh5Wcl/R8ld/h5

TCtnOvxfSzF7Pemvg7hQmY5uZ5Vm1TbWf8vbP2fvgY
9FbMQ84bEfQFTpceKkd5DaOiBuQEN2QBiXEnMaB6f7diBOdJvPh3dsrDcq2e9Cmlx91J+3UP3/sdU1Fl

DvdLE/wVF432oU9yT3+mpfx1Mf/jCvVPTW50HzDhP+z8xGWYy9guM4MI9ReS9wC/rxaKS4CZ4lQBvv6y

vevEJVWyx6r4b3MVU6H2wxRJqQust2tYZ+ahtJ6ISA
oZPDpaxYZ1bbZUtBKpyoXqJ6CzMHUeQWo7qd6uZsoFYdwB9k0OhRqVmsNh5jI3YnggOyB778nSnI7jSA

n+0YgTf3UwpDicw0NWCGJLx5vG3DZjwcryqRXqJsngxHyY88UIUNkxz/fUt96Z0kYfnTa2ruA76VYi24

5JHjd/UBEjWsJQyJt68VntLIqtW0VXmCGdaJBOzt6W
3CMKz6PaBP+Rq22K7zij/RWxSHH698mpnhK499LFIzs5wcX5EjgIoa10gi7rN+6C8LMld3BEjRIi5NQw

WMIp7i9/uh7ihdkt08lqwIN1MkOU2ddP9X4G0cJfgQB2I3My6JllsaDB/eJ82BTD5Vdig//rQ906jjF7

b9UKsKOFatq5kuqRwShiqK+k441Dzpg20TIpAYIOF2
HDz9dX4w2fR+UPUixXZ24sUO7im04NAEUw2USLov16cA40znM8pizUquuupbm4Y4U/7PsR2dC18gZsJw

AGXBRBEeU8iVuiVPHuwxoLxdQhL93YjK2y6+Pozu1t7xJk5mlQSCjsopjxY7T0Iw1Rm5zHy53Crc5uIe

+ysbHGr50kmPxL+CV+bLGi6GOESXIY62hdXORJ+e+P
4VZeUUID6T8HxTu4936Hd3vfQn5uCAQS6UPEkAP6zBMy6mtaQub4dgnxpIVJgyU7Cepv12xrH+zGB3za

INEiJB95po1aXX/j9KX61G3CShHKuGsIKvhP4/yudTQ13vTGLqjASkd7d8L7nviHyNcYgCP4Zuk+rrf7

PE6ZEwAHdjQMekEzC5KNeh5suKYfsSnb6if+y2sEcf
GLzgbpHq7VeWHUYUGwKo6UYJKOox+SPiCA85Q9jsp5GVlXkyNz0iXlucuzcSbJmgbvuxnbi6jL9pXIGZ

BfsmlqvoFHXRxvi+dJTidDhfIZDZB1YGlNzS6esXFQMwwvd2XIEZSJt6EMurt4crz2x0GMYlf3GWOX2l

NkQP1Wm2aZ8pHR4v608qHPb5o2i61SJYTOfM1a/vc9
td/s38HmfF5G6Y58p/6kDyZNRt/Shz8JOEcbBMbsDy9gzzzfQONeNXCTehyOyXkhOwk+D9H+n6K3LN38

KrNslUDERnSMHsT79zyzSbVVSCZpjlDAKYfsFGYEUABnQyiBwWmhMyOkBzIyzkSRZ94b4v8hy2seO3+5

w6t+gJi506UvL0OF+/612rn+9j9F8ujnVyqBsRwDnI
WGnx5OZsYc422ttlCh90zZij4q0G0VLcBgDCZ3cwpjEdke/MzBL5WWA12TUHb0xLqNQFqiBtjS3NU8xy

q2/Tv0ESdn64w7gumt6sLXlNiH7cHakoQGJ3jUAA7WijynjZrYWyun+PyavDyeQW90ElV/C6fzcKwgBF

NwGjpE0sFzdry/jt3VSv2sEqMYAXblvi7AeQ5rO4mz
hviP8vtk6VjuenomN7cOFRzN4Fp3a/+KBdvYodZ4R0IL85gnRZJUQVICZ48IHLFg4OK6KDK7I2X39WBp

HWh8Qk+jbMeUm0AhAnWTJBzpxDfb53FcWmd/q/pcvEIsvP8KrGRVUgvwzvqFlaBoWj3nUab1GwGR7yiQ

dRUiqI44VU8+Yjw22kn4qxuPMFe/1+pw2lRKKi196O
M8A5rdnBSZgOUpdDXuHOTzWMr3c/ffmc+PgiZwU47F7fZByrjQqpMvVho/jfLN5ZpGsUh2FOL2sl7F08

ORRT3R5HuvMiAvEJbWy+uPskE+gyQ7/W2bpg7/mgGMB0Xqdu9h7cG8Ix0uv9Hb8g67igCANTRwyyTpmQ

RjyldM/BocrN7caxjt7E+yre4OEOCfVrYZYyRpLFeN
yTceZj2NJUIpEtcUFFMl1dzyaU883pyUXPC7woM5JHI/17TKksCaTyLcc4g2cPKVcQV0Uhka0aVYSvPC

s3g3x5ilN5t0NDSjmARZ1ybxf48zsD/YBNRjzY3/jLBSTgTA/9/kYygk9OiycwQaWrjlVd5eJyWdVLuP

SZBPitSPfjIW7tidtdU+91jiXHjhfEFy5uaXrFYM14
PK4Eh3zPm3y6V648r/dPws7T0udtDi4W+iKwbghtxFPG7Torg1AfGvKlURRdxdYyHWBiWlgoxC+hAHFG

CdfHWG4mwK9QEyUp79cz09IYhPTaBUCfrP90qiTuLA5lqDqwC120g6ZYo0Vi0pv7QWdRo5kdjiL/7HSC

tsaIf2JcOOwAN38020+TNuq+peY6srazv0AhCScrjM
1cgbdHDAQwpfSbP/MUUx0w9X/r2VwdvtDwJRkhdiT9XXHjZzRqiHvBzEh7/Ij5/rOPmR8bqnDcDJpaXr

Rs8hEs6sfgMvywt04Zf7ojp4Mims0xSqUWb8UsbqXUYF9JnTPxroTM7+b664YClEKGPiy+/bDQoE5IMQ

lCRfRqVYgV/LqNP/Yqb1MbvTuMIo/0xtlMF9pmTuwI
eTnQlsbErQSuXc11OvVRLfJPcsZrxtw3ut2v/E0K0R8GP55xrFG8pnb9GAg6Wr3XpB/Ri0yVTs1eb4YY

tpEPn9IOeuKQGefCGYEDhImg8DefV4QAH29XUFsOAiMQoWt8DqT5mHEwEz4/Bfm4Bf6aKLZGFqyljw+G

3RHBPbKN8Kct9pwFCWv6Uq396GD1gS418hyJCm0//s
WOJlLJHnW7BQkulOyhYVstU6vRNYjpoSq99b24taMAiRtd+aGp5PI4+M0EgVy2zuzgS+KZLCr8tBjeJr

0bcEe2PJzU+Iwn8uswTXMMdrMU6tpJM5p3PGpOJXL2I8qZ4IX+NeXWz+WU96BEoBmbUlW0xXjHWKddeE

UptwhdrDXWuG7LSc2x3SViq/rnHoBzbINe8aTrn2l3
QzHZL7JItEXA+r1M62rw/W4dHxVxwR87D78zICBr9pXEEwnoHQ9VgAbkzicwIiW4T4ibDgeIF741YQJh

Yf68fYh41FVg/zj0oYmagXITVd1cAJnfABcrMaJwChr3c1f68QRuZBLk5VRmpOpiD+1J8/gr/jHrRXQL

5sPQ0h9FjCRHim1cZYh5mNLOxiw6pf6ugcT+xFSw/4
md0XFVCNGg2b/O0/1dbBlAOH0qMWydyUDzJs3s1wjTtFrVCjnlhCtLYmEvABXEfprMLRtbJ+wSUsuq6e

6zJ/YT0jKS5pSWDqQgSbMgR/J+6sVMuJmd4zKQjz7U6OZ/NPPj/zA//d8D/xtas/bQW01q5HDnztU28J

79B8cL6bJi4JA9tKIcQBTUz3G4mfbyKZjNBDJS76H8
wy6kEcUuxT7cXbC+FKbIK5OJz4kftbiIDtO2jICnD7Zpakoaghezs89WvCAMaFIUms+e4i5RJEHoOocp

C4fRmIsjW0+cVR7dBlBxLvjhcC3vbxCzK0OS4T6qrs34JEld8Rj0xjycP/1lyzCRWtED+aXYdPSROaXt

8sU5oDwXWTY0nx3mrziL9Nv92wkrqYE/OboVVPaPbZ
Ov/LCF36YDTDyIapcX8Y8c59RumLc6JWisuPpqm1fzlF4bJqZrulWWK90UuXAc5lUbPaTYaOikcVkhME

pChm1px+RcihvIc1jC+HKS2x0/o/G8r/pO0C+A7tQZsJBOPhHfDPmQh/pmxWdrl2DeB8TIS4yXvrywfT

RQVWF9r6PiKcyxPfrsam6gC06hUNEwBt2oPP9nbNL6
0YnBy6JwAoyl7zqU/8abXthoyO6AU3c5gIypIxCp/ATSvrQs5dSQ9nL10ZNXz8QLTlfk11AFI4TPPjAL

oMRpKyvD5y27Fmc22509mRAgX2iNyD2o6YxUKF9yrCk8UkJMpQUR2xvzMjpwc5k5N28WD0Qp6WkcdySY

hT18SVZrk+mnqf3oyqs8ryq+iaPux2SPYGPr690761
LarAeEkqkZNOg8gaIqopVWn8Dd0SHhUIQlF8hGd0HSAcOwsvlGTaTU9vVIC1T/U+7OBrf3TJYU94HAIB

H9mNgsgKmcRJbrmNTXgFIRIDXdYQUBzI2opzXfU+RK8rTIu46NWvvp6ioFwQoSIbs68fyfRjLWXBalYy

wxgJ6G9nwM/lOqn2+mgWW5iC6UZKhVs38eDpVr7F5a
NE7ZQnGZQcgbQxQBPLQpk94u6p/IJklkpmTpHdtdNPJYS0m3J4uwPjxkjI/pfua+XWqRU+s6YYJZN0lX

meacilopgky0eS9MRij/tyRWOhmX+ZyRC63uX6JS2A9mym0yAF3wnxRehuQIF7I7SxQf2cByUQRbSXMw

snobnTQlaiSHmbeZ0qFP9s8Ptrm15upzup+lTUT+im
qBGvVHop5xZhI8lCoGwWox6g0az58BKVT8P6nAkGLJVi7IRNpEiJD3frug0euDGdjkL90yIsnsVimvnP

EbPRHF/TWqAu1cnR5irXRWjklB++vXD+va1EtIOpLdyDjkZ3hNbslcxl5sA8O/lQ0QcdrsqBPdbKKKGC

eYj1FUEC8rJH88LBqqG3cRHR+LyBaTZGaQ6o3Y/sUx
z0LnyoT8gpxhYo8/Pa0+OC0Qt8I0vksiwiWG7JLa+Nwm9lERoMMmpcl3+u2L/+Np/Z9UPL57UKBcCsQJ

M3Z3j+jg96FftUKhn0hxEyynfwkFxHze91GUo+4ld0lmIPMdl6ZeHtWiT9+kahEnjCxRt7a5vpG6zOOn

jw3nJPcCqIgBPkc+dEXp+pY5KrgQWr8coRIF1ZGqdT
GFMmVzB8Xs0Xk5wR6IprvmWFjLL/NJYfmmhG/4pmaH8931r/GzcopyAtytjAQMWn3ADd9Q1sNFpqwkFy

IPhn8z2M99CjIfA0GD/G3nH2JErObeQQte9O2DjBrjniPLOYkibFY1gmJgU4thh13zVN9Wm9sF07Lbw8

m7wKNRh1qExYGP4kvOfrzjK/rS2GSa2QgRY6QNVt9v
S0le3zK5XeyjFm2t0Zx2OD9SPMA3g4V95MFb91lchq4khECHq9X1rCtdwApBDcscVseOZRuXQClNN7bF

nHDBpetVkcbaPq25i9lVKTOb0s38aqT4pZNN8kn2GwPhgAI6B7IV21wbX7Yra90xrEN5xj4WIwdcHI8B

ErupKTxN3ozwpksQylzqbN3pflCY5AdJCd8cW0UtNs
cQWTmk2OsFH6s8E3172NF8Wia+0B78fh4E60cvVrj/QdRA9rPNGkJm1YaM6ST0bnSX61iI57HH7+VOLh

vrzfbOJgbi3/6ReVS5W7nMLIZKqc4Ch/vRZ8RB4wFo9StbeQp8JMiLgzPtg8mAEnAC6jnHN7hgITDMYk

NUcWcvtQHBsK48AognbF/ue+c6G2ve5TILbHgazdZq
hCKXWVrw5vPXHCAyDRsf+Yz07qih0ktjcQeGkKpZNckM75tej6aEQ3CiM5pKIcZEsR0siaXG7RtXAsNE

16R8nHg56byBX488JH5jRRfk1Zj2yd0PObSSB+Hy06yMg1XWIDD8LgdxRFm5lEQgYbJJt7bF8evkRI9d

AfwQ29ycUU2mIhIa/JW1lCilENA26yG7ZiK52cH7rL
dInEAqwc1GWytZM34+/W3XwcPVTdevlNwadhzOnhcqJl9TimfksdIA87ytDH1eJF25s+eHQ66tDu1wP5

PtBiJ9tdabL6WS82FhHsghys496Mvr9AQ/mOQoKyhXkqlBNGSI17JZVZrPKMKZXqyTffVob6uUdWWQgc

MDZ8ee8WKKxWfQmDhT4hOVdxwwLCYgo6UpLKX8YV8v
D21qYO+KBYfS01wp+m9pvIdFhvdsfLBKCkmXQr1B5NRjMTlmnfM7PRmn6tBVzLzGVsCRvxZxH6vqGoSD

DjS48ubjE1+AgykIb6Y7PjKkD+BoT0JmgLVA1zdyCDrfzHi02BSVlVMwIE9/QgRDltG+cqlDsjFjy+Ov

3h/ddhHChMMGEnyqY2Cp66gc94oTw10qs0I+nC35UG
e04YtVlFvR2eYcgOT2tzpF7DSuc4cac42ULeOpNwrtbMucuuntwRWwQccznp0CEiGfipjJVQgUwFcI7y

69ogP1TU+0+c/6l5QRa1tDrCTTuSfVyMWRQ0vVGc8Dc/2wWTMbSbUxzE8tmg4kaIpa6cGBqtwssNBo23

Hr+XPf5TB+PIq3gOaZdkxFfy9FXh6cENSHR90VbHUi
3q7oWsjlw6n6wvxTSVBIwhOOWeYOmaD4XK/L4OEwiNgUoBsffI3pEb9CR5poMYhcW+8B/3vcAR16D+mi

/DPVf8K4r35KkWivepf42VBxm8DAoeNkL6MLA9PPo0sVl+ICqxsh9m3ZyZc0PgnjbBodMveeKetmL3a4

gGFW+Pqt31/vczdj1/3uwXhsxutIA6705LqwgFI4cn
phVqqdvoT0eujWQf8n0Mu9ltx7PGl64e6LUpXtMTScRU67afMeyPuSlcUduhBMCKGmRyGtjn3WaML50m

4p0lvMbUPC3SoiXKyi2O+seq5tjrxbBa68uP2yd8/6E+BG8bjcl4Te2bt/4Hs3OW5LIGHsbpt4QPve5Z

s7Pn0x5WI67sqchAFM7qPJWBPwzX/7bOUe0K8hA3n1
gG0gsTPTLP52v/r46wA7Cs0t2jla4VGKzg1ftnVAP0i6XCe19Nm5KuhNl6L/LvetmCbXI832aNOJWCC1

WYOYvZMBXyP7F3I7RkyJgjLdi0X7b9TqNARGp06ZiAuVa8tPww+ywgTQhF9SgZiT3EHA41Tulg7LyKsQ

f3cGQO4ngEbNRjNoAWzNgoxCvC4OXWfB9PFR9DVb9s
daPi5585/gaccrTZkZU3sB1itFlZrOnKPrYCzGMvjRnQn0XGXthudhLrmFJWP7QYHnBHGtofNOsxaxmf

ciKM7N8gc/r6n0/1bfFizMugSbu1VEm0uUJziNBaZxaBuzOyVbWSYOmzJnsF2S2sEwslE2batsOyLchB

Cd4tMXcZxmXxsImeeMRP4yyR0Q9117aczo0UVsS3qW
t76LTSDKRoLytr8pk1FfbP+fa/aWmWWD+qCwGK3au88MEx4TYUqE596GEyebdxYQs4REoY1zGdquRLwA

angD6AW3tdBNX724fspvsCNn6+OaR6m9XVovFs7v8EbzrvpFb9+of1BKcmLwoIU2eBRhEQLlDWxVo1/1

k7LBUrNGu+KSkxFPoW9LeVMr2vnHkrHFA3haf+n8lF
YNgbbYH06C/OrlrDuzau/g7tun0ugAwoRUjDUsXkIXuJPTHJQTTZ230vh6jwICXCDyQ2q8pkSMepYot8

ZNikWgExz+wiDKPpddoXRR3XgR7G4yhthjSM51h4Rb3jKX1g+CeO4/R3Sp3bBaxXrHsgmd4vmbPsA1Y9

Q275vlaFG6Mt7ZXI38dbKlkGmLmg9wSYTBPy3cNMog
FsOpq7aWKDnyyrnzlkFX+YCT8bLWb19GuzxARwk1dkWtyeX1F7mXCZmZzJzHoGG2YmDgfdKN2pQc5km0

dtJPG/FJGPbK068LBpw5XhIcXY7olE4QcIbogT9wY3C8ljKXq8nMw6aXJSkf3ceLddy5/WovfoypMVcU

U6ikIcWhqREPJCeDt0CmpKst/ki9dfsOyJB29CP5oI
T28lako3gzE/98QOWfI04QBwyTuIuslO+Yu3sKvwkJVjE0dJ1Yh3STlX1AiswjeceX344TRXOb1Y8uNn

Ei5AW3pylBWmq2/S2JuWTsI38ifN5rftrPPc/IvRlh352UWhvGuLizisRhGhh++t5Jv723j4KBfIFR3T

xF/5c9S1xdkp6IqI+DahlYs89NZv0ljsQYLZZVNlK/
+/g5lyvvC+7bqLpiQPGKhkqDl1HADFcyvp/M205QFWXbzBMPhYi0/i5WrnX+GnMW5S3kgX39pqxirB16

07lAOqhT9y2bwW5tgZJLqrfHPyxJHfQSxR6nLunuXl6xOgsB2eHXpGnoaKeDSUDiPrURwbF4gUA84ot8

+v3MXH3H/4X/IFVOqnCLQdRHyPF0ulfpWWkDdAdB/O
ANoT3osiOKG7lxNtWj3FEtms8JlsuiJgVv/BrTxAT61hkFbVAEtdxkCt5vxfZGIy+9p5tcA9E2X9G2IK

6p83x0w4F995/jP5xgOTUyxjpwCxT1HYiKiLTkH+y/iCgRMnet8fBmYteB1xyU3kDorlk0mLONr/x3M2

HACq+C0IW8TTOam5iKfpbMvyXV6vXJZ6IIPyWVyuZ5
jww+m7ipd6IJn/bqlbjHrO97FiiDfwthjDwora79uzAX1k/KW7sYKF4RFblTQ2lUV+yA0LqODnclE/aB

gPXVXtl/IQfC0qCybQZF1oP6w0XxyM5U5CTs6bcII4+PnIe30S/5Oq2f+fuXiTTh5oEV5jpd+Z2iojo6

DdKZhQhZXcxgqrW1dbIRHgMFV6YqK6BpGdZj/f5h+d
KQ8MT+qrQECRVIeVGeCQinAUCZpp8FWMVtHFt69QGnSGGG6qvP3CiN0UVlMUvaqt5vXn4/RodLkr3yst

sHvE0cc9uw5UBCi6IppgYXoBeuYsm/ekDJ+mHUBQ+lhGDqWYG8YXvq2ZdgQVR74Azr4hc2qECl8PIXkF

Jlqfs3flC92wOmF1LHM072a7c6A5jFZSfjLe4GgX0H
pte6Pjiz0v2og7S/Kr8RcK5uMfU8smxgPV44nPI05peVD/N53DM7k8CEt1U0taXk0tfEER9UEphPM9/d

oIHGNQisWBz0E6Cer4D8IvIdqDr/iDO0GxtrCB5fV48tyjEXNEvPLDFZ0gaB+V/3bkn8dj4og3O97uSH

4oeL/VbESSFDxQDbaRXXKxbxKX4/X2Qznv2qjir2hz
p4xgEgBxc5OLdX3w6MpYDgA3hLT18dIv1ElwgAYDfybv5ggegPgKFOKl1xXvOpcpvwUc/emR4tTMYnBo

L0k498g++BxujK0tgee+zhvRQwvySaMVfS6o744vOqJmyJqHtjLT6oymQbvTyHHDfky/0ohm88syh+K9

c0zK5RTHf7Vy9UN2+vFZO3eit0D72+eMhoWxQYdUeN
qnghUF0QL0Ji8lOHdKpGhOJPHDYqvQC7Ou7NbpgRtlsB+iQxFUL6ZRmZGwz6nI0O4ZpXWY+n0Wz3Cfbq

V0ifj8nt3mywoKGk9/w/rNljRVTEictPdhDIcdBuMvzWbK1fgdH0XAM8gupiBR2Sg+kwFuEHth6Iq5mZ

xMjbVprjGtln+JkYsBGxuXLrM4VYt01m+L6h48jpYA
vWZ2hEvgSy1iZcEghZdxtwc53KrJLwyppDHVjG6ZGZVHD1LcO8hTc4DbHSP60W7+wXnTGNTw4VLraPT6

1uxfAWyK7x2/dk8Okyk49GHmLcml4v8rVohkuGATCkc/xouhpda7KIiJI6HS0/SXzJWUI5lyJOlTPLVs

JJpKFNfRYRVfc9SQmoI79MVjv3ocY/1/lpQLjvpPoC
Uo9ip5iU3OmNpaSkRQYrvVHKMv8J73SX3wD6R7o3/Ld5UH7/Pg/X6IrYGhXgCylNYeA5Gh06j8lB/9tP

wfRHhL3KzQrPV3TmhmQDJ9vcHbmFcW+XFsa35qWsVXNj5n/D50PCgwknlA8N1oRbEwUtm8Qif6OtNHvE

bBApLJ3grFnojoFialuV6z5mv+PVUeZEWyiycNFboF
oj+mNrgKcq8scfV7EPcGpyB5O4M48AKC7jXTdwIpgt4vqmZ0B9DhGT2L0x/cC1gOBXxbM5/SSTLwpYXc

4vG5zszAYvCv+yL0rA8pPBzUqwyYsFeUfXImzYCSCFqvtiXBV1VO4T02fZ8Om0z0T0By3n6lod1h32WB

yAd7ztGd89b15hCnO6XXqURThP5trk6dfrv3MOcVFB
mCjKsUw7Wowj5nO4jkg+fvDD8dAMwGyD7XTriBjTriSLr7eE8LctnxbEBiWSWbFNN9dmAXx4WdARocwL

Iu3IqiWvc7F/26Gz/MMwdqJJR2pazxffTPuXamzwTgB3h0AZpE5LQOMzOSeZZH0f5hAZX65mkO7LEC2V

Do7bnF8JsogU5HCJLVwUKmVCRbtrcBkN5wcvNDo2iM
+RYWaFUyUf2ChledDR+0oxilLVq9YrEbNweSWmv4zYXUw6PFRhfj22ABGpEBEuP9cJZn2k4gdVhMHBBD

7lBdhYc+9bcyT8c0EJ10jxgOEe5Efj8ZVfTalr9XuG8i4fbnAosabNFqMTSdNPnObgpVt9CqAqWWZD4g

b3EYO6iTVs+HbhaN3xALv6nB/K/VjDwzpFIIAo2A2t
bTbtCLo9RDBkidwHRBLqxunzR4vFqYMkAXamN4BBDmXrfHZyObnUAS52WpkUEH+536NSO64bEJ0lEwtH

hTE9hxGNuuNSDXhBfVg/GTYWsMJQDOidCz1VD8g88zazcbcZhrzQ3mFJ4ITGV3J6T0DRi+p0QzGxHGcZ

kv+7kIGbPctscBS4dO3vl3juLbLHfpyipahyfzIb8A
AyWj7ndiC9A7E1z01CpoyP41FySmEjnfKt74PxTKPuckXlU/kZ2t+42hr42iFiGIlBiWuVaWk6upmc33

FPNqF7S8Xb7zE/ecc8Xg3O2FHArzEJ7Cd+t9pb06LbnAV29MzJt81RgbG6OvsQqUQ8O+g2qkySpbfLfz

FOsqJheb0sZzjUdpbiah7fx5ff26EMZ3CIPbCa9zSy
NlTm8EFnyFaujuLjvd5N2iXqazmSOW6fjDWHbzWPzKQvBO9b/4FbN4LSCitfH+uzGSKgBEhU/2nZCJa3

7Niqtj8CsGfoKSlu5Oqu4gR4e24e3AQwavHaHpLnKWEe3rV3ONx1P/EOjz2MUjOze/jrw97Rms8jdIk8

XaFOg/z//m1HksavGNMIJ9dqmuBXkbU+QVQ0Apgz
TTt5VoK/R/XjdbqVWzElfrPLuauC7NOL+8rhH+KbUpX+0tZJVwFud1DazCAXqEOL+2EBglb8R4yxuFSQ

FhKGFibVeQ5HBct2w7BM7obZKXgcQDwXpKIIaTlO5Q+rKZwY94d1xaripoHZZL3u9uHg7LPiZ0EDr6Ea

4kYQ2iT+F4WUx6Lp0OdRzFeegm0DDq06D3npbZZG3C
ZbKmrddByausqNiHEqu6rPuy5H69Gg285czuYCpIPuQhgrViadH0e7ynSVfUk1dNXY26tAQ6yCGLXvIj

JHzWI/NGqh1d+Dw03HUzwru1zYmH+8LIXLDjl5dk7JAHbIR36Ps+8LxlOk5jLl9aGHv/6ZTmqwHBsc+5

pG8o3XrICnV0WHS45/PgKWf83KxkCT9ncv0t+p6+hG
324P1tzTeShutsNzQHz+KejakQHlPQTmPQlADcoyGNoX4ilrsgk7AdlcMqE1/6gcCZfmALojEAsw1OQl

9yYFtAgl2U6MI3D3Gor9hNC4dHzWlb+2sA31piis42br+Vbjk6YwtWEgk5YAE/d6mHLL8bf9562+3g7Z

cDMJrIV1YQ1iUbSqLAvoEW/KxwnQnj35bVl9y0PLNm
S2/ZhCLdVpYnXdmdiJeuKQo/KwBgXkGJVgaSwlQRHX4s9N3L3jiPexAEAHP+vtR2NAHAPBkiNVdq78a2

xd+ptp0Hn5pXAW8X7Bl4TKur8yrjx3WCPjytCdSJgfUF1iqGn1VaFvKkT0Yqpt+zZVIIvuJ6b1flKJr3

PWbb7PA4uTemaqiW8tXhhKDigHkmIhjBpTqFlSj2xo
duFSxFZJ0zzvwp8RpuGNXZxGYL8vOPC1IisE7wFt5sjV6i+pBeMcCWPxC+Y7Jt1hjla/b4oEulXPFBr3

8MvPpKkb4+F3Zn24pPL3n8q9HbWZlwLTSVdJ8h3NnGiwB+26WdqKuET+f3K9mgIR/jMoRA0+BVg9rqZK

AxHx4i/c8GXGRuw9+ZV/Ag9UBQcjAxgsBuR88xLAhm
ICv/PkUQDHvYtWjarqm7Mvkm6D3LACwcfIUeVq7A45G4fhiG6FEJIMMceIWcEhvjCN3nbG+cmqj6NT+I

JAIH9+Lb01KTYyAtYeJ1Q/TZVPa5ewMZxS38on6r4Yv7TjziLUiy5UXlF/GltHpPW7QzOWJNjl0Q8ry1

bwaE6S6ie060YD7FZc3adw/EH67SWw8w0pbc/69Gu2
ynrWYl0zVo5Z1Tq5v+N8uBsCNCSbxcTfGv1NNOmYI4BX/31Ny96+WV4j4bL8tb6yxWATpp32qBOoguYx

Msv3a48eb8qmPiMqwd0nQh3EYzazpnb6PsJSnLRt95ZxNAgFOEZ/fxJRvYXWtkzsf0Ty2oWOm9G+vFVR

q02a255/rM9DfwgWwZvO4HIA4OLP8i2UsP/uGZUIlR
CZBY4jJn33AUX9nZ+M/S8Lapc6Dq/WQJe18DQb3WtXP4Ejs7tBV1DhpezmTCQxExHK9z93Rkcx1p1d88

5XyB7rrMfwrL9t0neDbOnlZ3Wem3vj8W6tovS1kt5zU18VVtmEaNA+VxfwZpfb8mG3OQLq1lRan+lBca

yFTWnS9GHie6nMs6i1gl6SiO4d55srwl69/Vu5ARcf
LOvxmlkZzHm5o/A7IE5hp5nQC1V0njZwp6dzTR/HMnMj6/GXkNmiKgJC9gg7LYeui66lKcsIoaE8Np5w

0QHQ77s/QHunOkG7fXEJlSvGmaR7PYChP857lZXznt1VwyS+ikeu9ZXSp2nyu0qR62hnkywivdCDp2MA

9Om4W6Hybj13kuf1UC8CSnpVtF0ath0dqHSuLZmq2o
a4d6+P4OyInsgUe5esxdWSZvq3ndo6SKqCJHmT5TnnZc/Jlw2hYsxviKW/PBVyxJAwSB5C+tfQc2AVA5

5nRuJ/EI9Zl5YS/MTFtaRnDsdCLqYkKCp/ynPptwEX7M1hYxpdbqhx89jb0BdsCoCLMKKq0GpByiZ6vM

OtlMR4GkZptZ1dCK4CTaCt8mc79qxdg5BcQ57eqZCo
zvUaCqBMJJM1Q1VXWMynRGxTQZ5kpjRJjrzxY9lfbCecueDZiQK03L0p7loi6keqTZtj9MtQ42a1dYVA

aI35B2aXaJLf6e6Fr12084QOoJy3zjYvp9snVk5Pv1XPXiZZSpnNLqiuvvvZEF4ChIMWv6FfV8k6dGgq

A0RiiXBANFFll3kX4ZEtqBU99hGPC03NZukJXYOiIn
FCxOVgS+updyRB9mN6j64F8XR5rBac0yXEC0YlbWyQe6/iI1gNp02T1RXWNC+2n0J6Ahe9QLq9P+ZQzy

hDq4cyDaMRybog4Fw7leyuqUO5qHKfYr0lXbVCSKRnmoOD183nqnbl2ekFi7TSsBfSEpdBF5x4gLbZg5

k42l+G/Orpe9DUdqTCzBNRAlEPBjhzB8MWjUkRaDmu
CHEW10xKOASCXmi66SpO1lToM+GOSkgeulFbW6CL3BhbXK8vJbxg4h88NkBYgqOp9megStrTBHkHG989

c93jdkJNm6GgFcq2p8hOPNbkasBPkamYoUApDKm5rBaUNjxpX8GOQaSB25JS/uAuZH51GGqzU25q1qgl

VObvlYOuqBrc7alh5Rj4P2jrD2OsAF8lZfy6vJ25QP
KOof4trdZlqqoNfDLpqXrNoxPJKyFsNqbHqb6pe/OBH2X84zqVKFpQC+IAAC0mdPK/UBjQe7vblN6+4P

M1S2okCP9kIRsStHHQOqalMd0GZfjKv5uHDEUdbFYHiM3bNL+8BHYpCZ/9LTMPny8jk2/tJ37j91EL2X

nyaHB1CxyZTykqnrxMtKoZVOtyngMDxom1X2jXq1vK
V5ApGdhQha6R1gkQbmhsv0pmoyhM773QE590tCd8+cbXXsZz7BFAmB8yw4LAXzVsJDqmuIz3KlKpYnj3

rfrMR+vkT0HkQNJm4a6l1JFZFUyJuydL3lX5GWG897vnxc+TLKG3m7evttz0hSmxGd6f064X59k5Da7K

ngj6nYlwFucJmYINFv0J0Z2aOUCb7L8k0zftnjkXxf
rzGzaA+E/zf2RAonuNM7O2umXyjCPqgt9RL0npJiGNtAhOLzeqngnoB8QeOT7SdIcbZBTTaD5bKKNF6X

A9DxBVgMq6J/60ciRTwA6A7n3SwfWhIftrLqyLvqw81v3Rbk+lBhkgg7L3DASqDAphqqHPO7zGg9O98Y

wz4a1COnb2/wKWcBJIATQh14M5ZuZLbcBOYzrzR8kB
jlrVC3lE8oqNE5fumerXCk7qJKxCWhBpqljoZVu0x/qVlUNV6rkL6yH8TTtGcHjGrrT30OeEETUPUarU

Cu4l0E2T0/Q+iFuBskuAck/siRroMiYQmyT+D8Ak+ukF7tuE/j26aFAgWduiIP6g8da6FnPUkeiUMCxb

tBZyyM3OmP5poA0mrYEtdUNpStKCKFEqowLWUIRdsG
LibnlzVLjzjNOe0C3DfWfQTymBkOOQFbChKPFacMOtLGQ1Qw/st0//jbkDEa8UoftMnCiyH2MbnK5jTy

+dVvdwmHLDZDtI5VKoBBRaPK3hBT1zkmTtev69VuqnIuL8eVv7QlNMFKQfh/s1Ew1xzttjGExDY30I/j

UHe0eT0KI42iDcbECKdvKaZTeDvwp3eYbALqJdWEEM
X8sEXQL4e/wqI2J5Zk56xRXb/JYAq7vSo97LODdmRY5sKpEoM/4Mq544UG4L2OsbWTFN7xnuDys+3zhj

jxZ8B8DyorFaOzjmnZANAsFImh18FYANCONqVK+TQcGvOWDoIOAg5hiFmZ3fK57NFBoki8If4r+aDvt4

bsGHcv3pMi+7QOKzsvfnvmb08OxQ2xEmi1+mafRSlE
0shORsFR8TOwDnMzgJBBuGOx26u9c+d57kvy7XVuRbVAh1mg4FwsVaaLXi6PUVdNkUoo5JIEZp5KeLet

QrC8Kmh+LHCs06nj6qJSByKL9GLcfBlnPUWfNdwhbb7JceLXkkw2arVEY64pGWJGftPLGccb45nut+Ft

[id number]ER
dqjNARqIZ7Da3Qdmlx5+8BUzkRhYHC/Dp/uW4sF2mSuX5mXpvNhY5cqqsW59cK4ZQCFs1+0ElIMCO+67

6kbK6fjkD9Xs5W0eKloOT29WHO8JGqvO05YRXFR3jPHlptxFdNbfttNhytlYcng4Lvr4Og6UewYSz8yH

Rh+gKzQRtDsOy1T4rt5SmacsiM6xgFMyXuoLt+0yfv
u02liCLA0NKcBBGJU14lViMS+2TUuoWG6VWUQQx7BgCqJTzUEmrf4sUmMKdsW+MlFZkXtWpMzwUs/5Q8

liLSdkoN49y7h63OnRRXZlSe4ytkPuBQNrciElj4zxC7dAPe4aiwN6y8d25V3vKaSb9iDHV97tUyho+o

Knq/A5F3tDsonYUN1SMRAuANJU/H1zFHxf+1zZlaxl
GIeRh8cokucfLP0qbilfi8M3Z5N7Vn11tUqrfXuJEJ2eC7CVnl3CVR05MM3QB76q0NkjXqQLg7KCFS08

wBFOioAq+J0+cHFep3Zo3qx1N49tFPPDJBB4UFMoKW/owPKJNqwwmaO1sqxmo7mo6eDPQRZTdcHfaBJp

YP15mWXFX7kPeeaJrZFDy24PUWISG7FhLH/5rhqBML
7Xx+NzQkWVBezl3ZKLUfx/6BFpVCBJ7xWI5X4W6MYHe+EAp0cfTqzQmHididoi7TUnbWEKF5CX0UPXv4

Zy53pYh+2nlNVpN9Cm0XJg5vmWHcGVEKsf335IN8F6cvPqu+hHuAKQhzZfyOdOZtAi95r/Wm6DCXX1cC

tkcJ9yAkQjmtn1ibqgYx4DtgB6E9qhNSbJuvvN3JMM
bGdH9Z7iHNczmwokiBmlSNWagbP/ms81GWhFdSrk4HKOfBxRl0Zuah6Xl2fTWrqJ2b4/6QMdQ9fNqwvb

RiR/UrYi9927TZzid/eKrhykrAVanf5xtBO8GOggpnnNcEukRiuObgARP5JXksVqbPiG6ZxwYeNkhjN/

vvmKKBGmTBGfWH4rBXPtNQHTvc/RlDQd68wL5m4thQ
qPFzkyZQlgVlv7zOaQeMmdoyaPY/tLpIxI/CvKb5+AxVYSjrZaS6HpxO2uFaGtIB2VEFq9e9fRAA1C1K

9YOAt8CIrDED6u+cky1G/50KECuVrFi+Hru3vSV0YvYs/2do5K3QmGKYR4sxxdMGsvydqFm1kJMs6ru2

hBH7iSi8pNKUz8Mc6oDgrMlEYrcHO6z9m72mTOx4Xk
/rswEwV0GLSpRRgJtz49E1KbhVzDhMljLIH/ml+ne9S8VsQekMVe+IaNlYo2lQ46VUVPBk3+eXJY34+e

jodfySs43/eFcWAhO9Jn3yeWO+7qMkeINaZVf1WwU3qg1DjqIZI26A5zJAZTFHPs5xgsCRaDW3EWWqIh

vLaULlEsrA8YKEPadKNaTguC9mznBcg81FWxU7mokR
jYjxxkX5wHEMaldf9LGjm7Fm05csLq8GyoblXiCBkoxyrNNVBRHMjBvH+c7xmLw2bJs5a833yjDPXuSR

d7QUj2dUmuIy5wIAp6FH1V4Pfu0C8RDtSSYowVa8zeZl7T1or/ikRaAk+lge5+kFJo3atu5flqM+61oZ

T922flCLT6EUtCwfm55m5KkPlPl+YW4MlsIpuTd0CO
HKuxNcfos3lujHorPBrU1GGVVPE09LD6Ben1mYVxTV0laKGNer+BlL6N6bdgSLCTwQEnCnuFVdzfWPfk

kXkd3ZKPT3hDzPSSjvy/TQ+NjiiC261tryye4wOyxiclanfhZS5CgRVpG+ynR0rWsYUytHCSiZvem1Fm

nEs28xsXqqPPsz/LvW7qeKCzypfyoFLpfum20hIk+P
yqe55cd1gtt72TJjMMBaf6eo6+sFFu92SZ2dtYmsbAKw5/2WQEu10b4tWlwNp8yrm6cMkEwQn7jsrwYV

9oGt1y0CJmtiwd4JP3KAoA6t7lxApQreNJr2U61cSTTWJvucCrzoYecKF+Q7TwzsP+AmBImz0vdLO/IG

7VhoaMymzn/KRP8IwCtiuJ/Jn8onEufykLdPqsHvID
DrpvS68ybhu9zvWIic+xpvCFDGcIdMRALhYp+fKjfhQHUPAzKrfcXjkElPIsBn6wNNKkyeN4YxuMep9+

bhaPCpbk5jhjgPeVH09VSgcD0eKmyYDHhuUY9pudpwg9eSbkEiowe5SaI0r7axDtjSdKv99XwhDKIbyn

RMsrW+EsRdh90RaO4E/zFozcU74R2PruMAoIBWsLd8
Yke7mztnALLbvWOXvQ8ttKjVK29lsxj4X0hD+DB3ZBpefkZGKQ4T5cRsa/e3g74DOi//a3R/fH33vAGw

DHR3DQRij3CE5LuXcaIeRpTcvv02stwrXTYh3SWkDz5YtI/Jl0FNHM67tl3FVM60emHuMguejgwC9qc1

BZbFF8eZOp3wZYgcATSvs8rW3GJ8ov8vvL0EQdeu7u
1Ujq3iu+zP4fdOYmaU+Xs7aEjhiLm0nVoQh4nHlJ76BNuTxS8B+XKbHzNw6pA84Iu78E10FvWYsALYhB

BexYGM9r2KW4rsvugQrSuSJsLmkB/AsABODsp1w3QSY/0pNFnjIa9aWgkbTnSn0U/73LYlldhaNdG5lH

cXn0KP3XB9NDYs4mkRHwEwq9Fmju960PLaVG/IcuxR
n6Ae/RL6/YXSeXSf1FIaDcVD9/3kKgXYpY5GD8Z6q2VKltBkq/TtxUClR4EkcBQGqCzBf+gos2oO7dDg

t1KCT6OZRIjLZRk9pIa612anrpNirnI6hB9+1cngQ9urvnsCdzq+pO3/2K6WAnkZnKqLxbWcQGA5ZOYt

mqOLAId6XBMli1gZMcMas94215jl39KG7hLdAQJfeW
mvGpRmIYOVu/MGFZvUh/LsBvL+YDM+gHqX7R7v19GapjWU0bFbedEMtMlou/6sGgAwzIFOCKQfRxW8EO

fvRrYRH+jG5hUH1iz5ZlWjcZNpq/Fdu/DITYCYfgEUbl98Lnpc1z7Xv2nYJdAlbAnDsSOXemTN4nWNOm

TuzAJn/PokeJF9mTWRmOe97H2ZUPqXrAVxdGohMOoB
5bPpgZZ3f3vr4Xe51yDt91Zl0HA3Uycbt+btIyVYgMT+7CHvX1nmgll2Drt3d5WAoVVTsWVMIwlGPwj7

2LVB3pAtuFZYrovoUczhY4Eea427uo4zZhuzBaNxdOcYkCPMJ55dUEtbiqw/DveNUXQ2NUXkq7M4Ths/

rGpcDtTIB0mhQ8IJyVM++9YzZBs6ENP5h7jLfixbkx
MD+77SJvozIfqKrZknlb5Vq9rfmfYe9f7bq8WZHg/3jTXmOUFauGOon/ODZxUQ8ZWsXmm03DDGZk3hIl

vkr8jSlRktrZxvSAZPYNHS5P/l355Burzsy4Koh2AY8aGnvNoViesbyW5YNMnLdSIs8F7psO+/CJny52

6slqJVmXz2Fbj6abcwIYILGF1FKBzrbWkbWNMtg8e5
c1KooAZuTheuM6EWC3iGg7fXgxrmAar16GXRxjJHVn/bihk/MDzTH7/NATHRzkuiT1OPvutZ50y6kNIi

FhRBWw1SKkungTFvRe1IdWC3FXVtEFYmfVEJQSzTKO1sFByQ6sMcx4aPA+iBnmC3qjQvwKn88SYJhh5q

puvTl2so68yjwbMDe9y7hnfKIT7Ha41Y87nxXU/9yU
tMprpB+Jxgxr4Xrned2/igUgtkmK5/i4bkrC0RwT0jlKJ93W0IZCX7NHRyGAoNDZtnH3YuU6jOSO8yOt

fIlv3Fx85mcdgEM9Ukl2jqaI1iR9G8X/pZYUahymROPzOQfypKy1Wj/ZiJtk9DcVBRJ7aeCnp2gWwHO2

hpLGT93k2ibh9+TVFZLqemsHnlze4s/oxpDu7otWMA
Ike8KsJeBS2P4p7f2uDHigXmD0TehXZ2AT69j3kMXKI6sx5BQ76znJG+Y8t4csaISZt5kA9xROkmCYYb

8GcydcvOa2QzUJPFYQFIHwHHosesadtJLEtENOmWq8RDRQtd/fMMotn9WZsDjcZV9gFa+qrRsPdzWzkw

z9Rei5BPw67VKh5HEX18P1Gu89/2abxpTFKmy+PXv0
yuoc7skvBHlbJC8ME+BojnqaOEtsnwu0hyHfCOyLbD+uOewbmJ5p9rGPCWp0U7NVw/XelpxSDOw8RWa/

Bda5u1pgPTPDoIOR7cYv859h/m1g03w/4dLQd6B/KaAXuI0uK580+r+wPxvdR0+h8lpkpBzW6ymofDpx

CRLjtiscRfRkhyieF8H0zTYPvaJifDQJZmO+DZcfV1
XJ0lHvkIvCEJSgZ4zwnZ9VxIBmo4F3TZMNh1Utdh1HETaZ2wCYdxBVBM1NCCApNg82xa2yqM65/vAY/d

c2+6xY0cjSZb8dXy2I8mV0gONIW5w1wwT2USX92wwUjnAvkVF1wOBpcDeXZPDQTPi4WBOWaIfYczd16S

8EqkC8lO/xvsHy5vozDJHiU2ElXqdJA2SWjfoIYmbt
5l2Q4v6Yel/HUzvNx7c6FBJizjil2OYkYP49xo19D8Y4iY7cWSLWAuLIqqq229b/5AwsNAxNhENekwjZ

on8DaOZ5LcGmz5nEtHE1ZcysLZB7Y1b3RCvH7k3Gczu5YIluZU0fCcfNt2IQpGY7ECGLv181Q3LgGvRk

0xy3oo26gvw0Hnv6kGp26p52g8fA38BZx0n8W64RCk
x1auQgAqIx++7butfhN/u5mxS3yVhCvsOfXH8l944JzCMCTO3VzuE4+XoxUZIkdfU1ziYIWZuo0BMD0M

EQoejX8YnCjSR8ygqDP8Zr+kudxmZQ7OuWvbwd4edODa+jSFXb7zwNNLFaHWAbTfWrPaYPQ8ogU6pI9k

mSVU7s+VoKwx6RODrcjJc9kycVoX7bDhdkv/oq09wY
4VXitAZ+RQV1zIeH52p66zUxxmuwB+nptVZMp/GYiMVaQTb3pUsXK67npJNUTyCPYtGfRzakt872/t4A

eq/8vq7R3wrU7h9kK2bLFZFgzXTjAZoRu1n82XrHOwd/ggMFeLzOTMQtSI36DB7CN/A6+FScSgDh6a7i

Plm2LC6vRCXdpNBxJY2V06QqZiMay6mPfu1KaREt4t
PpItXhJWUEYHCKBOL/CZS7SaPP/0gC7Mo64JG7ePWhYueRcaMm6qczwDnBEOCUbozTB+01yU346pWL6f

+sWufIldfI60/I0TYI1V62BqMMZfrnp/HeCnFKH7FrGqTLjpmLNccfaJpsFBGow1CJCXDGjLUfV0iLCU

cK3BEzD0JZkFTinoZ1aOjFWiBTdFmhqI6pUsQfp13v
StPqGmSS25omCj6b/xJEt2TJnm4h3qXr/mrodNA1Fjqn0qVcWYKFtaiY7jBi3zmwyZjYspfsF8n1wQMt

kdgNnpWA4MiCjNEuth0IH0xa0QaD1teNW207eF4cjhtqnrgRNZxg37DAaOq6ZXr0ZSx+qqNApB8PQnsX

VBUpEvM4JoK1Vv+NzlaCYbkpAVKUkEsoAnYa1SwHKG
InLJMPlYmWv5BUkA4nyJmsP8ijpOC5OjjVkNkcK6lsFai7khXyNe89MdQWB6sfL6EKI26ogwgBSiJlyD

2z5zu0XAiCDlpAUgjrxwNqdwl+jOXnv/UMYRb91rtQRlLXvAGSGfMdhUGgqHrG0bjoEwV18O7miSYK31

tgVhmU9cL2rreJ6MsiiEcotuzz8klITbKN9tLjrF0K
AOMhRrWADDV9p2GOqoSy5U1VqnAPodPMD7yZyHmcsjjoeRVl9C6exrLTwK9PqmyXcntHOny6eUmn64c6

/14w18BtWrVhK/NV4PohvjMCKk+MVydFHdY+ac6c6GJvWmMFRkrx3jhD37WZCNcxSfIKgT4FBt3cGukJ

1fqlD0AgCJcV2vL/cHE2s+SXmDejCWxN7D1bp3sjYb
yy1lhwEhdwEI+9wjhMEs2NkW5C7tCHysKgfBpDjwVd/z1/rtuprIb7D5rsmduQcJcpHZiUHNJwMordWJ

5WDwek0dfRzr771sq606sw7z/DVcoykRhgiZO3Zcrz1Ayc2yQBwxYQGpK9aAkDVtxhGtb8CXh1xCCwNG

i6t7pYWJjyWP1RdUAlackXIy1ezZVktxzVSTHKxGKR
yR0tCTy8rLol3PTzd1BiW1/VvX72LbdY29xm6XkCjF2BJ5K5I37RpyZFNngdjB03ccm2OdFrfrx3arDm

oHbZ5UQkhchn4w+H/Ebrgq9T7Ovyj7XP2ligyBOPvsjMiCLNxJ+jBtnh8quhq/fdEZJYeKOIMXb5AtYA

kXFoR1vISnwcXjepXtg9OMLGe9izqjVlqnx7WTdHTe
bXyJzXUsL+qptQWrtb8amXlodpnW2fboMVh31sgD9N8xJquEktCX6uD8DIVDZ//4NtKGY0+/2C4CQKFB

phT4MW+eds8pFLquSXO+bcGOflfVqGacTou23vjhW50k+uz34TuKnfWoe9fspkfcOvSleeCaQzaX72vp

kf+2aq3j4TEzRw8Xv6JEdj5qeENO+Fdtm7wSe8LFyh
3dEO7+VbzsLyMO+A19ykKiCflmGu3B61clYMx+If3dE3vEwQA3DFQ9tYXYOzWOmqfvLo0o9uLpjz06DV

81XXOwUkDetBEjl92FiC5wVEw7mL69Or5QRlOwixS2vTXB4NSJ6VFq528B0Y/Tmk9TTXSf05H0E/rFgL

fnqJgxsAvJQCYKkItFwFSED2y/AT7WqIM3NJiIctQs
WbLkbN3OMAXRr0BU8KeDAjG19mp3hSdPTejrKsOzSwcp8eN5pD+908Ox89x1s+8AAJqC6s4vWMgX7XFE

ovCOxwnPOHuMwsWkx/pMmw/Il1rwfSgzsOaU/jnmXwuJoHSD+ZUOHfZSZKTyWAjLsU8jy8UrjzYuk23r

WdHhsjrivaSkgNv8BFPDEHBHVn5Z/YtY4Cy3ucBijc
QwR1frxdIY5/XWtZ9g+IOAFRF8y2z95x4OmbKf67vYKeswjTV8qWhfWEYeo7Y1aJJ1kh/SGloyh1KTGx

n5AQ2WNzDrIoJi0Lc880ROstMY+KKwa1R/BeiaNwblHybDZuDixYwimhDvd7dpg210ws8YKrjcqY2B8U

LGPJFCWDRmdRg7zv+LSbobv2TR0kq7Krq4UZ4ZioCV
IYwhbwq451F7ocNsZ6zO7Biz9fJyg3+xALML0kPZcC3Honj9xxKpwv9bU33SCVPbZ4ZTotl2CkfW/qdi

JHunTwHPjrZ+rIVtdQDtvsShv633vnPRW4aF3eAS+HrqSH60+Y6wvLJFFmIRUP0tjUac6n7CVZikN47n

Vc6A2Vjd7XM4rOpTdAj94o4LkGB+jdtNazIn8BC8nx
+ycy29eiyND8VH/ftvht4WR/cAvxn/4OhSbgYeV0HQNDcVzEv0623Ar5FWm/PXOiE+UIa3A70Ik0V7Dr

JsiZtYayWW9suQCI3TjGAAZytcfH8wa0gINSKEtNjnU4Sd6kvasmIRCETZ4AiTYY5IZ9KdEqHb2jtM6P

a36YVsKu/GinB+PrQfZTPu0coZFKLbdqZCPAX+UfNH
d1c3yjKQq3tLMJ6dWv4+XFLjIzodUmfnrTdAXTmtzAMMuaK3jDuIaVVp62qCMe0RVWsMc0FNJP0Q7G3+

RvN+rmj7lxvEyd36FvrioHTzNS33C5/0NsIqn5+BtnAnvhc69pAYlEccf+MxAT7+u98OSMbAP3bg5Cun

teBQW3iLAmrTe54H2tH9MfeYW8URMHMDtpHwCLSrES
7vHMqoBcA4FlYJ8p7WKUHFY/xzYDtuSRpbHrt80mj52bCiIy+C4nupI3/zIv6in2v1A7GNzZTJtY75/n

L+9X7w816sh3AdRnpQOPb651x+v2nbFjM/zKapSf5UIotCLYoBp/+EPdKv8/yeiBf2xp2RNOvgiwMXgA

8/0l4kfixHxLpI42guAJe569W1ntMJt88oBnDUqlSx
8P7fIMcgkFeD53Za3ntgwygZYWmOtm2fELEmo7plZsVAR1FI5S+qesiw4V6ez5uAE7WYfa9tHcM/RPMM

Zs5WDdMZ9U7i8mDwcdNRtpgbQulcF3hVwIhKCeXUyc5MQHmDSZdD8CZzTruLPsB7PG/1QbbHqOrgY/cn

1Rp7iVkE33dqrPP3u9iejy4W4724pwIp3Vz7EEJ71z
SqUJWn9kzALR9NPm+8JHy6dvyHNblS+yCDqPcksBs2TsqlHeQan6khtlFz7wSDerjTrJyXVIZvHo7f1O

ltABKOGUA16UIqZJ0QrOOVJyQJHR2ZvTcPJAJwALpQmQQx0dSnZ/RkjrN/uTjRxdjVNTagUzYlmqDRNI

FXtoMRrtgxdKoTiUDFSpIKHeSJ1lRaJZvqOdDL2ksl
kgTBF6hGJGW9Kptkcls2UxQ+5iTjyohtyYisRGTYeOOY7/yfLl9Dch5sV3v+VegTs/vVh377l1RLVQ3w

6QIgP5TkG50XEzPOJAZocJtbbweGC7EAx41IkqxYw3Yf85l/4bPK8d+nimbSqKFiTnEw+bl58kUIbznZ

WY0p8n32TDcv7TX/Y+8to+HagaZTTiRsEkI1TovJr0
euNDR7J6PFMOZEbdXNXMCVk1CamllONmXS43bYieRSvGjeiiiF+317x8OXfKzKZ2+ms15m9jjYFa79T3

Wz20/InN5K0m6wrq3AMgs6QOA5bUD3u934TbeeoDS3pB1neW9ARwDFx2uG/qAL0yuHXwdAL7PUpgSt1k

oTJsQwq/JSmzQTpik+OQR21EzjtW56xA1NfvwN5W+M
C9oZxNN9INT7K3biC+rRsDSvg2WDveVxEIU7be3nZf7yX6dG7JSVVX5SSypV8Qn0TNZPyYBqM3GRLA6d

5Zjes688f0gskw7xwQSg/+VBenGwk6+SDJIJQXTMQ2WkQ5IATzRRlviabH2NQkXt3eaKFt+97dQ8npXa

sKMe84e43Ty26d9eLK/9HNdxYCbKWgogQfa6nx2Ojs
sEBmraF4PHwnPugAfU6DtljNf1d/gqCLKWgA7Se0uoqmknaO3HiTu53CHhudlwcs6Eo0oxO6/szJpQtc

CKV2Jq6G+9CFICN7VMt2f0wKFFlq6NmSP6jmE/PCPvxZ0scn9O9q7Rj65T7NTg77NDMX8fh+a+oEvq8P

dguYDoqN9uuN+NgWUumfwOpZcUIlz0aNH48PnsdYE6
VOlOuB1Jz1v7spZqLs7kqPCNv6ckOROMATf+RUMPyUA4h3zcJMxp5lWCT7LnPmXRUUUHXKCprjdswSTY

ZeltscXhKfOuilGn5jrkvpfy9l/4ne1ea0nh/iVio6PeAzvt+r3L0sB9K6ZykKbYSuJOb9+OCEgOIRkB

tFmqM0JSrEusHjC1G8EF8HPYTmYwNQQccDjsZ4LTF5
fAxhwD1YcB/vKT/4UHn85ykx9BuEG/2dtVsdhioWaKL6u4w5q1dJH+Re5Tj98y0ZwtuVf6X0b+BJ5aWI

gG9RvRfGBjjXQE18i5d7pbzKj2R9gCmc20cH7Cqu8KmiR5BSKzXrY0WhLJD8itW45uhPdXMbGI+XG1Nd

cakO5x4YllEGEyR0BroAVCA8YA/2v6na078DtQ5CDN
UyZaMn9vCQ//5JTzTcaAAng6IPBVgkVY0dqNIO7x4grYg7ud2gAp7f8QtKiuas8hsxLXlFoMMj4RdXjE

zYQTzqB3JG8WtTlc0sLPkDxNkevJ23xdbIWQnP2Hb+iB1lCaM1CB+hz2x7hdF7bqkoMavxt7c5Gswo7I

lRBz12V+KyyUN4O+l6MF/C3nUDpE406fPtTTUbmD6d
3m96oLNIB8ZVEJb253GhiztaEaNLPxUOYlaDTXzRA2l5nOY2zOCNPfrwp6Za8g/HXJqzbV6vOsUAzvrL

26vgEugsw8jc5qQCDmadTtVbXOF/wHNowhD7TDQkAJwktkhJs+r1ZkjNpv668wm7H24Jhh3qTXDV9duX

1SvNx457yyqGwixbTTqTVu9QeCu7IOI+Yg9PG7hhWp
xrp+uGIb4gZuvyVXN24OsBf6ihlYFH1RbRrwuQqGqUsaMFMQF1o73JsAILSBomHenSmwHUkZKJPTdNLL

g4zuD4exLtn9wBK6Kmy2Hh01NSGBy9k0rsOHEkNBnjPe1YpFp63jt3dqC6YVOVh1caD+Ii3h8HH13dEj

7Aeh9Yw1JvFD2gm+1Mwiy52ci2nc6NTj5AUDz1w233
mHkD0NX0z0CYPK/RdNAKEcJhjpNiicSxy3IJJ3dw1/P52biv3XpOLcBP2B4icS5GjBRXiR6L+ArlibW4

sJq5clG5QPkn0BTTf15rWbrxqi8gEI83K29P7zEbZuDjMnoLlb80b/uCFzT7pWrhegRsw3nUKO62l9cZ

dIR4r6p3wYJYUbd5e6389belzgIBrgzoI7FeQ97jX0
q5GcgPEqf8hKcsU1TlLCg4DEJqlmy/tnFy2097xwJw/EO+nhOesE1n+sftI6A7j+vtCGbHimBVJzX0PN

mOiWebpq/7GO2fYgHso0aakTkL41kHCxyOV4LoT31BT1dv9738aXzstlW3B8aHYbJdEGunDS4BWrPPbj

a0tOz5U58EjId1OH10cePKtWcOk5cWBJH09z9eGsid
9MZoF2fg9xOyoh3mAKiqUHoGDiiGp3o0qbzrJHegE5qKiw9bjUrqfSLrVehJZySHSnYPpHXiFbkJcmAt

yrZdWddN4UYPk5SdnE8wIakwb6A+BLbQbZ6Nh7Yx3bwwnLdjQVLIG5fcoy+uZ02CumCl8VDeNMkENapP

023pN9la1t/ep2503rmu33XKBzHJDw4I6X9abIrZKV
PZpSLzBJHM1pbckhCbfjnJb4F3mNm6zwnU851TXyxErbnIZilq5nbMupg7lx45x5Fi2mSyXKb0Qg/CV3

dkGpFCkXSMu5+b301xE76x5N2Mlba8YzjjVVD9mYBJKFPEA+giIdGTyK1BBZObuND3K+1aRnUFqTAIor

wCqImzdPSmOHdNqAON7YHyiWajhwNyBN4uGP58g0X2
NZgNhI0qTl2JVt2kl3yfAyGGYbOt/olHWbbHhGSUu7xKKJ8KhZFknh7vjFy6IM1cctSGi6bWEXB847a/

/fA0VUtvzz2RVh/G4HJbUXVgsSqLMRC41/aKRkB8psisSiXvprminSg8KGsugD9bJZFrgDk5iPFN/WtU

Tjiar6RtiRVNXwEYF54196xvjkH3o0B39r4rVwU7iQ
ReQOyJglAIdZoHz3oH31d5tgDghsmlFFUghtodgvbZnRI6/Qhm3XnNAJ+7ydXbbo7oFPoJj25mf8CdeL

DAZFKOzG6jDQx76HExXvrZfjqc5gOU+h877a7AhIhrT+zV/8t9gPFIaBSSE/g9wUw2F7OKhEhbPPnNyw

doPpU+ygo1xvC1JRT95Dt4e9XgWputYIqXbx0dq4+9
3HBOoLouUmpxyhu/WZtyHEsEr16wn0l8SKIlTPn+MXc9aQKSvD1/rga5oBUesaFVG4YaROlmSIabjpzl

YEEMDY4UUQ5KsdHfgcmVKK38M7ft85vBlqHJmC7hnujdN4yg7rOHDd8u8+GKv+Ur9u+sHAko9cRY9Z2B

HRBznH3rImU99HjRdK9dpfuVAst0w5M2yFDMjmxruT
SCy5amuiRVd1IK4BJH6cdD6o4ywxT7T3UXnWye5iFg5zcimh18dMxUPnSOxm8NIlrp9/ew6+DL0DkdTr

xyFTiZGqAGASr54vIYglU8jrncnK+eDDJMctMCldwHorWmrdaa6GzBnb85mttrQfNQdJA4EU2rqU6FBG

SqJbUMXbAfaQHzbiCTx/PUppmneg5k7rcTrtD/IO1A
xJA7poF7I0VwJXqH+SVEaSu8w45u3HECiGcBew7KoMYY6a8X30cKG1MMdjaI7bIFElZvG6vZlBa5rDwW

pJenNEb8kgy7S72ssr7IXfkB78gmJMBE/KCvkHfaWw88eJ5RSB5Ty1s3xwnHIgpHL2OlzSYEJ36znwhb

FvoGM+7UFNzW6aULwzQtKwlunT5/kjwIq5h/qjaH0+
OHynQUixVIZ8QZEEoPCjK4b6T8e6GSp9CFw8Ye3++eyldk+JSIPc4ExpXCZaIbFiOX7Ohr8bcb2/9wtS

L45wlwlTLCbwSW0QyN7ac/DE7YAQMdhJB1BrCp3kuSG/7o75cPCWns12n3ASEnCnmiFMNikNEi3KfZVd

U9AKsHIB6fcmM2x7HE/7TzhIk2+MILLS+LnLbeWL4J0Vd
0tITfkvn5iLS+I4WIJuutWXm9zFrjnYX9sbRRnDBXJBlioHbgXGIF+8EwLx9OouXrCvFJTQNj1iPPKwd

FqcLyBTmBRgUHhwBn/bxmhnblJAQdj2WLhXQNJZK+J6fgydxKTGCrsrqtATJWxNamAmiiuOAgwBMGilm

ThJHGozYT4amrKXZntXw5p5NrTj7AyYLdrdk9Wh6Ue
luqjVDQPx5ViMHpdkOuprvsoR5Z20ZigAQwdzBQDpCrbCJW9pzg/U88gv5NlzUddGiG83YVoRFPdsGQV

E5ea8RJM/36V1pQkccvM4371jqtjbcgj5QOjAaaM9jDMPJScuywgB2FKqoVgvi7vT9C176WAfx3tfLYH

DAt5SYXook7KkE+sK7za42U15JFAH46CeFFOLUecVz
/kfJW6rnZs3xYnJZxRqVCzpuSvIANfrz3wlFoXRTQKxXFfRmIGZjCa0iDfSdLY68eADO0pcPiPSdvUhQ

RRUXahQWVDHg/9PNOKbiOFsqlWtOx3MOygytLQcY2huM93kM4RPp6cdxuiJDUZ9Lw/Q04udaEzap+CvP

B0LvobN+TxygnDF1VND/xQk1kJJPKuJ5chY1A8vBOP
vk4G9l4EPhHVRCmS+5JU/6tIRQEZFyXaElOUsHsKfCHzohFlN8hwcaVQIfzdo+mFaIc1+FONLIB6z1L+

OHNavXknSPUiaKamUpgJUHFPLzPjxqLPVj+x2XRqkVTk/lvgPAWqeTXoJ5R3PrxMQPPdHD5hRAtkHTSG

0BW4rCmd34UjIndsIUPfP7p0gF57/HN8DN4iGY6Ygc
rQY3tO9ehhAS4xS4llPKi1e7BHYTw8wThJD5rC0jelm/2URgv9caqFrpdpBwic3t0TGDXLPv71rdmcpf

GdJKR4DvrtmW/vqOZDYI+VmtRuHjnU9LK9Owkqza1z5a4sXnUFQhauo6WCcHO1/ZGtYJf51ZiNOR7yCU

yb2CWoWN/VqZZRjxKcdMA9QwtY1rWJPgHrQyZsjW5c
YiAGW7KxoVWhTDw/a3u2VdX53cbjstdmoVuoXrCn1BkyHbW8wjHQQ1Ol93mo7RlKUj6od+URhtX1dOXu

wu80yNUdoV+CjlR3vdap2tlog+ctzMVjWSZmNq95QFM6N7BKHoAQi90UKf0y7hnAgQs9ypjDSu2rlqWK

MBFiIDHgFhwBudIhGESj6098IYTCbcvxXrq7+VEHlR
aMdDnRX7kQHQ+LSZIY7n5A9L5K7RFgqVO9OGiqaxRRv9muCdsMcAsULWZapthlF4warNh4HlQyT2Jcmc

F8Ze3gUcYRJ5I/BtdfVcb/HswI7jy7fnj22OvIdUmq88eABQ0PRc1z1f0ANNznou7mFyJRe984TQgfGq

DTeM4EegMFM4aeH1lT5APLy4ebJnkE6aKgaWqH5+Ae
2z5eHq7c03vC2bJktALdfy2AKD2TAT/gvumvLhM1lIrjDZ0YKhOcrEfMtpPrd84G5TOVIV5mJp7WyvSr

zKPaPf48AVOde3y2VAmA8JJ/D32J5sIv8u4R8u6QZCruOnPoEcbqqU2GC26lSVmOyzAOVvKGArIQ/sQD

YI9LbAG/rLwZTG49Csk56KxI+a7qnF4L/DtOADXLPh
2Es3W5mEu0S/nbJrBHTsikVbJkTr4xlFtV0RsQjwtoN6QgstCm5tTQ/ZHS+yQiBYNiMR9FJR5lbgf7gZ

O37rVSU+Rg5fvtOZsW4Kg+X5VBVkyP4HBibIsHO7qegAEdTsodMif6YSI4rQg2qo83ZLEc6iWkrHfOrD

lK2Nl1RtGueccEc+DZrwwhYIP8DGUmw+mzKej75ykO
eqpKpGZQ9my9I9JfLmUdMnmbGSEwifx8Um7fMQrQo1babBLk3iV/vAV6xKWQ9Gkssu19xInJabjv9lgv

GgSxLuarjcxziIOa2IylggDqo46A9b1luPEN94S5OqyJPHj9fT08dCcr25LMKZkQ+Qcz7mtdUaQzc9fx

TmxtVlHTmAHm3kYhussnc+XXQ6x81y+mhonQdJQ0z+
8GJ6SJ3ECyluyOLTvV0O0SE9NPAtxNFr2zWHyvcU+H0EyWk7hSkwU39eAL1lWbj6/dJtDTw/J5ud+H49

TGXGBBnvHAtyZNxxYcSTzaJuqs9OeQxd3Z0fdDylKmwVlsyP8KaFbEco3Wbv9ua/EPd2LKDMAKEMD2YP

7rDlKz9WIXXQ10dVo0FXDv+LkcxVkD24+/+4mPp97A
HhfYzWhi1HLzskkrQU3ogWDyV44fk2xHJ4RHQciVSl4iW7gX+1mDu+cjpzLF1nTFyETKS4Ph+C1omLwR

1ymHDAwM3+tsSMiRhf8yW1RjcOxumricqse8IuTBiQLFqNscDI05K+KQ5rOfSEmpsv5rQc3w6yawkLBH

/Bg1ih72lr//mCvHJwJmlDeVLzMVIp7PJP8osf98uM
bx+K6nxlWrKY+5HrYZp+keDCiyJwDTTyRpj0fhvP47sY6zFrfJr1HjZP6wRRaPdnj7njZQ/hLr2klhw4

E8Aohx21PiQdOagj6A9J67lhXlrK5u3KIgjV9JdVLpIu/rUnZl1zrs0FuPZxexVwD6dtEt329AKdUcRJ

LLSzpLzdSTE93oS89ABtmzkQuuA7wB7pKwrTRuwBG5
JlDrqvD227tC5x8J0TZXXxnsY7JTVLZMW1gt/pg0SSa5gruco9EthrF/9Ni2dKH9848iQyF6Hry+xV/8

wH593mOlhNnrZcpT95YI60wLCpxp8TuVEU1Cmqr9pHIn/R4nATAyloGdbMT5YPxXq7eG9kZpo86AcZVO

BcsiiGRp4qQNS+n2FELcDSqPbXhWkHkmADylcGJVT7
geFJgRrJslpbHqSPnM59/ihZvdtFEVYFSi0oHoE87MuwtJaDJQh5B2pV//qMV0J0WjU/cW4lVXH9/1S/

gckSagxDUoDQKvxPPFsTwXy2ugy+NdCuVgfDBxhrNsGS+WWEDZnZ1k6gRPYlHUBWsJJHeaaxXtZaALCF

dvwEOojg8hgEYf0g+Ue3vDpIidgrC9/4eactr1QE1p
gidmEqK81I76SGnE+0Feaw1Cw7M8icyGjptZkA/mi171Qv+LLhAT+QyToaPhfC8OfKc4+c18sUd2JpN9

NpAOqG9Z3HVvTdMa5+5hkf6mXElZ2ljI/YGmDAK5DKcbcTFogrUTi5TVLmUuPSAdaE4u4QWSCxuXANv3

sA5TISwB991axGIEP7aHXkV4axjdQSK7pRM1e8BIVq
HbcvcJgoVmE6vFzaN2CqCRWvQk/Nn3BSPX34YcxX8R64uhsWkVNirSgo9RHkufNOkWQgxmwZQbkIY0hq

u0QQEaemR+5LATG5OY5p9ifG7bz91/zjsYTNcLk5meRX/o5HO194KTwxjnrwO19GtDbz2ut2R8+MRcFa

c+QVcWIa3kviIglJ79fKIu99ODEL7dS0P3RUD6LxIa
jdt3t7foBk+sSpov0vf/rrGtJpL4gjmHbQcMw0SjrvLCb1r10Nyn0Wsk+WEkF9isiE/pDEdGHbYPN5jX

YzEiCsCqzo4dZ4XnF5Q14U28zHIgai27x28+/KhZZHvgymAO+aFfCaoBCzfBs/6k7eCLVyclesT1Xnnu

v2zaFhCzoawqIOZlnIxqkL5ep0IXCscMua1AJkVwxA
hmPorGxe8siZ08cqJH0coSkLO6tuIvIdHXh48lG/Xyp4nm4m2WUatK9YG56GrRTX2tLwuALupUSRG0W9

RoOeiZ56gPsi3vC5xsCuwufZvp7bCyO2fKc8SkYKw8XPqvBqq88wbfRO9TudAsTL9/cZtOqzN3qN2Kez

5wChd6ggmuAR3PdDtHGVWaPTbl9GOE3cY76MorHP3G
w09i5CcprdXIEaqDp1oMpdrJwv1m/g0t4L7ryTj8AB3VSeft9s75G2YeILDN+fdnQYrXa0cZlTIlLHWG

nlVEBdMhtqTG7oFSk5xr5zpGgg3xYnUTy0BeL0dKuXdfvgT0Om/iWDLxpAX6G9cm1h8MqQ62hSlIsO/u

ZLiS+x9owna+6XkdTq7DPaq/L0nBT6xBMONdO9cvsf
QPr4KFsd4QTQuOOpbaWLJIl4Yd7BAGioOkcyfIkRH4lRNrHG/ELqwwMme8CqMfKJv40/nVKGpwTDz0Mt

qlFKEfzhUnscfrEAFw/0IoK1KEuZUaOx9y6ThPyyPCBWSKrRY39wFGHPDeUtG8jmX4gcVUdLBcT4ixPa

iByeETyhk7+0d6jzQknFZoFmaRdZnPZddN7YZ8m0Zo
PZCeuteAUVRRdc9EAN72MSU7Mj4GXvHuge/wrcsazMrpBm3vC0tMluWFGfvs+iB8ceSEyBqO5koNfDu3

YPb39kZytgCQrLxXBNA+cM6qoXBfoSNxhD9chLOOYGP/XLeI+J8+VdrnMakV5Qx0TYSJLay9IY+I3rQ5

PWOrP/gYB+y14mHN/HvFhdkdmTR0L5ur/ZaJpy/Xlj
SPZtnXncE0yRgewoOG5C5coEdrWLx9+xGVwlHoXRpvLbkZkYhwhge438zN/YGGAyJFGDB9NEssLLvBUf

NZt6cmd5CYev/RpDeIrt8yf+hgEuMvnkVJ15Vk+t06wg24NEszUuhXLzAIrpY3x9h/HSX/qdeNcodZyh

lv7oyfuu8i5bo2YG7IE6M2za1Bh1e+0yTi+Eavx9N9
1NmEH+MS6au4RNE6pMC2zNA9xdX0Iil6A5TqSTbuanih0jVXB8oUy7nMXopjaEuX9+UXA4oNZmI+koLD

Ps6UrlBrl6HkqyKx5dwDMvmkp7UEqNT7YmpgFtxk/uLIcwrOHyLtmjXGC58y68oXTwKcxdOIR/s0uSY4

tQWLPa2lT7y8aNPi4gNlj6w2Q88JnW8ZWPllE1MuDG
3F1QuHUe2kuAegOq04ry9sqIXytIofUHW1m8oxwdXnLl85xYIinglOyQUnvAwk6Z/8DWOMDam9ka3b2o

tepB7DN3kTz3bfUWd67Ti6Rg5zughj2oqRbIQtcCNReAkgTMYi1dxHcpyg1usuBOh/5z0p+AxGlZtqhg

tO779CriMmSFu39RO3aT4TmvJ96mm1c1caalxf7T7r
PNIQiC6VHUfhha1N7sbWpTVfT1r1pClszZN2ZZZgDxBnesoEdNw8jXWbsj1JQ1QKLkT5pLUTBYVSRO5X

izwLGXYrtqKiAOch6lfy+fPBTuQ7b+OLHr65k0j1Ro1HzBsa2/gl3a8UsbuVuyRMSJVR1Ex7yFdZhcEK

DYqmAzG0frQf18MwtZ92S5x3RK5EWLI1YW5ZXt1/ay
rwkbXe4Am0QkuXWJBEVYNQaOKTvTiQm4qBbkx8LGN14F5WNZ2ZXDOTcmoSkcf5UoPv5Xu/Bb3Embb++r

5wnyakbvQvuo04ftgCqEC4gwLC+gbCF11VbMo8ou0ZyRTqP3pU2aWtLTtcbBCU0VcqP+IhDdvH4nG+sx

wZzfr8W8fsldVj4DnSWr9Zu3Uj1QJ+dwktx+b8cV6y
ZM6p508XdpGQZh5Dmi9nx/ESxc1r6kmONMANRpfTPCyJgQ93npDKIsKHWcvHsy9NOF1Q6J1Oyp9Y8jc0

1xoAMEi9SM5IAg3dWF9G6TDilBR3c8m1CODYlTwr65fkkb+DMj03Bfyo9gMoHrrgy2iUhXwY+fe5IgPv

qYVPAet+qSQUoi0PsGJ1KLDjXujFL0VvS2B3bhhX1e
C7Js+wiaXFB9fkcNoThn+D5v9ih4hlm5ws8DAA7RbHYCHEv9GRAlTcpy4+oZJooZC7VP2zgoPJg8KuTJ

ePEk4RlK49+skbEFgUNrP48UDouofBG6o6dvHqheCrlI++N0flXzYpMsDqM8HdWPt8QUbXnyp6AL6Vd1

YO9Sk56+PqHkKJe58T9ij4cF6jrjUHCPkgddTeH1q0
LRKrrdmaxN1XGc7u2yTOKtwYsGmE2MS14KeoJRa+l1cRqVvdOkejAzL0gp0Mg8kXHOdJC+fFV9h5KnwJ

g7S4Vr8AktEG6qV1qUe4DcisK1i/LxF0qll05+12Y4098wIf1/EZecnj0+V7st7LjkrdCUYXeDJYR0iM

8sWrCq10Eob7eAKp6SuGm6uIu7stRJn43iVZRHRHn2
sf+W7AaWrCUbIIC1TGykP3//DbzLwHRmr7k/QwNkyoZvPnrlSAJnNgvFLPpEOfpZdxTm+EPgYzCOpNGc

Hk/DuQJCVAQiXC8B8ak10JO/uQU0EDHtb3DCNmZP688uyIhDd2lG/xBrE5kZuhSlye67H6dTzoC35vfv

xNieJuYqx7eIaN0No2zLLASEGC8KRr/Yv+Hciyntwy
00MFx8HCDT065HKKSyfWlFEjyUIkQdynkDov5rSJvCg5YPcfgvjQ57kecTiQb+mOHNB8OXiytwHPIvJ/

nBOBGMRvMpaS7i3Ylt9SaSAOvanhGGfoRm0gRgW9LLh6E/rsC4VxiCYroZ2lX3j+rkKecwEADa3vp7fL

9R34hr1K0Eppy5jRiGVznWINR6j8zbWkbS5Ev/Db/K
TTUZ7jQlPg7HqIgW4qgQRuK1Jt97ioafFwtYTHlndvjRqRotEool6mAywvjo1k02fFR0OgfrNqgr8EnI

gFYKnMGVh35cDHQN+QchDSks2QVWwmiAppFBGJe34IqAx2aXWAdxGXZ+2FN8Hx0cy/NnC/tZAq6/ZAGa

sSwUp4eTXk/vdOYeZPxYwl7chX76K5nBP2gI352yfG
k5fZIrhQlgylv4moLaql/0APt+71BJFFS/rs1U+Vp4x/ktNNJrr5I8tBpK7Zhh10C/hCneYjrzi1t9C5

wnDG9iNtCYS8zmR9MrzwkB8LTBBPlBZFYmqy0rW443SBdQVgDYqyzAoawrRzTWH+/+mw7NtHTHezC3di

yvYiaXACJM2/MLqPNsFSFXJozu1Rze77T1UhMIajTy
XgPN4swLL3/ZbVtHj+Aqn8+0PGrQdNXpMvaNOwmlj/yWRoHJ6IZ8uMaxB//Yz0ESt7L8R0R77hMh15i0

ETAzCgJvthfKceC6Yfr9IjR9YP9gdtirJIJgOR2EOsXpkLuLycmu5wb4HE6+yLqP6Zo4Wse4NuSsJXcU

XPY5vK22nEh1m5v89wOIl56vBX1fXWHCxj14kq9eW4
WJF8+HyQiuoj6gMyBpQJd4N3jZCax1bQ3rPFY2UlwbyMJ9p6IcCvdMhrJBm0dwzsJ78iJ1E+VrGw/ABs

swuY5buuf9ex7vz4uv6ALdAuo5n2DpSiEjmdt1eamrU2IZwJEhiOHr+tEEO2PpysP2Y6Un3Fbupp0W9r

cOEIIe4kzlDIae3WoX/kbiWCm+XoERBLenNDMWswYm
O4bvUWdYxPHHOuI7u0TBBGN3Hln/zA0klcFG2xMAC17Cq2Ow/Z3lCrO2Ud6uGPBSMkiCDIr5PRnd5SNx

Y3zoQXSP2Lh7baRdWI/16vKyAlseZIF+y4tuI7FZak2okd1nWyDnkfFDorRZwCP+IMENWJD+F1kbF1Cu

XFvL9lrjRfqNxCGx0tXHtc0I0ZrttcbZqDqGR3rNuM
SMLZ/lrpK6glH4gkISu3Zr7JreTbabJkwMH/GnJSJArXtJu1WhC1nv1yDV08ioygIs/ylt6hadxLxBDn

m8HBVnLEDj8hEqiBoiOWuWd8Z1dIS7vxfd0YKD3g3L4Fjfi1DjLzRYivv2DuULlM5Pm7RKrGbY9jlJH6

a8U0Y2GdsGhB757qoMAKvmTkIHrVKV2jstA/Jgr/DZ
QJC8hgonvkh02JLBd9wGVfwi68C6zQwviEbywHXRxEP5nADpQV5D2r6HE31QKxTxYC0e7nd/8lEE3Rgo

YWKKHU5oA48hWmAWtdRpLts2C+3lsoc7lYB5F9MKC/njwYV2N+sDa+AyR1kokNe0Ni7ya84k9YBO/x/y

dSE5PhGpK32EOjc46mSGjn8GBwSLvIuYVoprDxnNgi
K69FunHPnUPXuKsYa2DbA6XJaTrJxQYLGWmHrlPBK57A9RrHvP2Z/TMUeppm+osc/MLaDltztI55+YFa

mq1Ko2OQNPk2hUqekEXgwuof9DQceIFycNlPL1zdAdiWr1wQ7wd4N6/5BxGZ7tXBiCi28Xe5b8z4rLKO

bZiFlc8KTKZXGzdqY4zl8c5By7sSmywKgxWFRQQX0D
gCRlhT4eQmpFVDA35VWb5uuTYM2l9q2r7zu6EBtffH9PaYYP8potmmQc35gnSlrfHiuV5SUsdXo1NZ7e

eqbcGnSw/PUl2imJghKP1/9aylts/BeLo2vCMoWUeWFw1F56RQYS8I1TbPe5LwPKCFoHBh7a3kJdxLxr

dSr6K7lf/HN8rT7TNA4OyGbnZHmeNHjT12O4Axd05q
spIiGwBKZ5vTYIHAEwbcH0gFtmKljXHRuyOqMwLzBcQL5RT8vXwdh9mlDeIVJW5LDc1ndmrVXJJGJUS8

+O6AtYZ04JOIKe0s33S/X/EVXu3//ApPeJ/rpqKrF/BWsufNs5g36x02+bEcE9xbBhvRpR0eMYBICkC2

4eHa0/bJ/RkT9U3A9WKZhi9BkFQasHrZKL3PJEWB6M
cuEMdzIzG9gpk+mjjrHdpdqhyRhGA7HhXx5ilLrvWlv6ZJ07mWE3ha4tBDp+CLLm+aSlsYad+j12vjY6

3gHAJbRqhpTg7AP6qHd0up2wyshH+yfG/5vnOdoEsGgbj1R0wInM5EWv/KXXcuY2hSG+xxOxD9RZadjF

PZpmswTg1Jcyl9vnqh5SgYIiIOxhvqkCpk65l1vvY6
MZ7k2ifXCqXpz+FSdXPBRExOWGxImjOSUDa9IIuySHH1MLzMw/hrIc3NVa8CsgOdxzhozFNMJh5zdMCg

FyKu+QjDj5O55l6baJOaR0y2fpXvz02CpJHQXJ+ZqMCTlQRpC814NKDQufloeHoam/mg096axGj8xhLd

EoF7FGGja3KqJetUlEmax0ZicYO3A7GbVbXsPFy6iF
PpbauuJ4OrvTRBrPTtwkTLkd1T74i0UkitSwJvPdd4uIfYFdN1jRSyR8MpPEtR2T92xQQU629UZZrLGi

MjEdEkHX7UF7bv6SoEDYG3poT+g/WUBjgAT3yWMT+oEIAKz66F2t6deD0uSFX7qCtaJ9o9ZIspgAxH6V

yMy4lzbBcTuqWdDgULFIygNPGU8mtb5FFwrY0C4HyT
8c2lWicpUH1YURj9uYrm1aO+iLboDYCSuaB2xVrLy+KoclYG4MtY9tA/SGMo/SKruwnMqXp9r1yVn4jA

Q1AdwsPPles1RdZlUf5p0A2gdUjVo68bcEWjA6ehq+Sen1h1z1vsR5DCKjxSykJuGcmWQbXN+MrNgcUK

fpJAjvoOUN0+s8EhHzBar0JV+GRQMc2RwIiqaaFH+E
i6RnLw2VK505tRdc2qo25QcwkJjTxatSzFN4KzpZy/tGRHm0UuPeIjM5D8dAleu5ctn/MT4oaw5RXH1g

Huu2JPFYsyrG03S1+RlomJab5PlchbT5bmqhjxFy5IK5gF2kIpBeGbX+V2jPHxnS+zLAli/NkOxUl+DK

Z3DxmnZAV3oW6GPdED7MF65Fc5Kh6NEbKGgitA+TrB
qScXbViCmLDPjvnj7U7gTpIw36AnOrAJUMr2Rao69HQmisNtxYmPKg+AuEflTB6ZOKL0ElLEwJR2bGIi

VFPFvp6E0tqOx28p0TGvdH08RUGfhNHNFqzBf8KHM/Zg3tXvvCkHju4BZFTNZ10nIo/IPxOqbYnFgJCP

jsKGLjr5gpobbLQge8ScEcdBH95zf8M+iuM8TIgxy2
i/ndJMWkVj4Ggm4JSfogsxIL2THEUyjk87qK/u37vEGHW4e+DrWu6agzSEIrJ6CLFtQeu2nwJAct7EYO

LIRFlKIrOH+7RpKn7TNCO22jWnA6YIl6Wh4Kb0bIiSJsouj9bAk6RzqXRvLNFgQc7MojftITg0jfnoK2

kiUDtGHLBJvz16j3pGdwEMmWhen+uGzLOx6XhCusgn
3qDasT8l4PkIkDJjHj5294ApkkXjLjc+wuYCC65lOqCVZrL3XAnRCsv+KxdAo2fQK8sJWffyiboHaoE+

HvtAXV9+b6BxYl8lLySLsiSwIQ4CvpgePB7hwh/z7lbkIGDKsYKQt8cxQC6lu4j9t56YlYJw8RtPIJdy

nVRE8VGOpQ09jvYOFnIpjyb9qr1fTE2KgK3DBZb1ob
vV9meK28Tpz6OCjCZFY3TqkW7Yy0TDVhcnauKn52snqdBoLA1DVvvnWvpvWwMYJs8OpRHQ5fCrmEEfjj

Cxv4rT9B2neEY7JGzZHnwflixcIapt1NkB38M8wK0aev7/A0W2Iyol7O0zXm4Wu8ouVsdIqCsfURgqpe

mQawe7GDYZOMxe7twBwUqDvRUYZ5nh3nIj9cfE23Qw
IKk/n5/2acta730FfRIGpdfQu8Zimjb0QugGMgK4QRf/sYe2TOBw61xtL8+BdrZ53ywBbPceksLn2/J8

upvWQaDlMdp+uAwIq0YmVUPPesjcYTpm6VVdY/vN8X22MEQcf6BWT1n812dNoXoGjdAVQPwGt6xSlUB6

kIsgS684HD05wV7LWbZkiGdKd6M60tggej/Clov/Cl
mP6M7MsYcEM2uN2nK0yuhn6kiJvF5AVwUt3Led8b+I/WJO+4uSqMfqzqC9yOVBZYbo1xOYq52QLG/WC5

9cZEu8ajkjsCUZ93LmmIZT259+Agjc/EUbNeRhlvxVUt1sCaX5FIsTWJDt5evaWsPHr99mpzyrG+eHY8

Q+PFkxvc+bweFErC7ru4CLcBDEkZCrpvZ1zveT0zEc
3JEqxA/YEFS6cw5mfmvojGnTDPo8kqYXVTpA76YF5ki74GvilxqVgFppphEX0OOTEuH6EndybduvEagm

oQUOJRiRHyc6E/KVp2+HtR5wpcVaxJjNft58ZLHCBKfad19j59PsXwtnD+ZuYc5ouSEd3VOmaAcTX4IO

qn+La0bhTibowxrNrN8e1+mbRExmYC02ZygIsHnkcw
lh4TFcmDAW0OtEkj750GcEeDyHc1vjsTjOJBSiGe0B1nxy/PEOkVLqWQomqBxNG+Op3ekGzD3+XvexFh

glhI8HVmMD+uWvk4jz7jYtEeUQRpDPmv/YRxfRblCEr0jodoUduNWM+Rnuys6TnlHofBeg84YDpY5Rkr

qrEnKRRPCpxoZMZsw1D9d50YGuC8x0w28DcF3w8V5H
PY0Xz3LXRmFMLQdRuKi1GErtrbwUOZpMo4y7B6ovQps6DnwVLwg8l5wpUPitd17Q8/erfPtkNSmZTw/B

kKzP1s8JwfKyCGopfIC4NtIY7+7cXnfsJk+pWxfH9YVupczNOZp4Z0XgFW+myRu0XcCDHSViLfdcmbOO

WtW96VsKq7F5F1F/Cv9W4R3zA/chqt+tljWMpWQJBI
r5og6NGqNLk1ah8YmWKjoRTZBHctbhIGEHgex86+1KWnMz04nVkSHpmkMhmf8pGesnkshUNH+pZu88fh

OBjpg7naCMR8FSso4z2Z+7gyEk0UXBV/lZXZSXrmO8NN5fzrNE4w58I24OVfAWjEPhXwuz5oc71BJUzV

HX1xJeCr0PokNr3L+kbAhaTRnEWEI+SH6ZRJ5sMLvE
62lI4GIlnG42bl6qwxNNdoUPjaLswfKbLA14cq/tM9Xmer2tVDzLYq2+odGJDkLw501zF4vnQfyeaLez

SGTMJxkChQ9Xl2w5cPuobqK3IkZjT6f8iHgQxc63Lp3loavNx65QsA1jvyG78jnpvbfunqzTaBMfEsUM

bdgvmT+vvhwWI10yUnu9v8ykXMX/BtxpCesrGZWmDf
dlCWabazG9pQpl1gcBPFBfm5opoIyo6ER08e7pCi4n/nV2PqoM3ig/086rht140uZkLfXwYO3YYrWQ2D

CuFYsUNsvEd8jq40EPa8cxL/+6mOCZXtnCeIc2AXhI6MdX9B1H9g3f/pfl4Tb4rB2214WowCa6yI+rQu

QIwnKG29nRJUTj3bNtv2AZwuitW4+ML0X3lVWTaOyk
zfqWMPBGwtyui6p+d/9AuYnmhmw5UpuTxD5/cQ/t/3PUjv+Nfadn9tqU0wha+tHZi+b7xtJBZDnrFK68

R+spVBzw0LFM/2tG/n5BiGygxbo7a9NU8qzYeRvzVcTWJjiNG4ZkjJqaGf4VG/UhshfK5AUXnNuKB3No

KS+HP5mCZGBw6LEQ5Z+OtDlH/bp2OVuy4l3L5ZNA88
KIO9NsIrO/xGTAo6BAz8rTBoxuFpU4c7zHcsAdo+Uv9y/3L/cv9y/3L/cv9/1yYVUNa2FXHuwAxrxwci

8nsa/100K/0gJ+gKwwXdCwdT7fUWgi4f8c8BMwbuVrOHH2N+EOmGMOyRupeS8NfNg6tbdRckkPVw5G0o

O9+bUXdXDd/slysfjiHXeDc9QtddA6FM9iqUMoWP0+
eSiAZQ3xmAww2ap7qDAgn9YV+JmPadz/y5xf7DIGc6JSu8BoN2yek8c4FS3PrppjN/oBfstOHue1917F

U6vz+G0Uj0Bv6AocW/VnkM2ba0jGFGjMTMu8OCSZpqGC4QDFpWASH2KYolLreq3IchI3YhcgU3lPWnAk

dsdt2ZsYgYivnijzoSCK5/cwgy7KtfKOW/MwL7iEni
3ouZbfdxHorGruwvOvJ2wYyksJ8XqFtV9Kdr3qdncrVU3MiIc8KhxJ3xjF3qxK62URnHlr1dcyr3EEfS

PouFmyYJrE7KtCAKrVqMSUP3rMYIys96W5kL/EEx5pEeG4ZTdsytfuANIgwyLzbJ2gpTjWcgGRFCf2Lp

fWIIf9MZze13QopMHID51L47nrM2UG46lYz3iwjZ1r
snekmm0wvuiM5LhjfVjPYxRCIDLZHFr+oWcGFIUod7W6lyS4rjVnqA+svNW6wyGUKFQrfAxhHT2zFL57

KQuNkxUyj+6ZcvEOSeE7j/Z8A1raeTrO8YduJmX/djGOvCLi66hIXL0wmWBcwFn110DzsVjbBA0EeOW0

LtVzWNpAzqksHk32XWryvLiJm0QNd407TPmbQHI9Q9
qq3/keJrK1TDPSEcgHxQeveTQ99QisZr1GayTl2qgShOInJuKdXuPJ+PMfxaJSKL7AmNDegMl2UDDkU+

nu9ouDL3gtVvXN6FQEj0HufkuRk+tjpukGR5cgD88fTmViC3730uTL5KHQ2rYcs3+cakheLIpR7as7cU

tK3Du90KVJ+wEh2INMQB4/i5N0rGiBUFQtQk9IsCh0
usRps8x4CzBQGvhMNyJujCFlMBJrg29sl9Kll7S7up8bsVlqYc2wdeLQmn9uqRMKHm832I502wryLQXU

k0WSp9Bjwx5czjpfkS9PZocUBD7Ogh1y/ByDrwvjvNA95vUy0pUwt8t4EhfBl8cUrXQJeK3UmJJV4h/E

hU+9rcBS4n8/JDqtz5palh0ta/zaOzdDDhXcOzKa+O
VnnInEm9F5a9SqXP+bVHdsxyc6RdhgjiEMAxLu0btQAMUk+YIlhRv0bOqwuJw0JcHIbmaJXIx+JNK2nR

5UwHxcAo632DuVewQC/qZOrHCIsvphpHHtzXJSYeQeRrCcnE/2JskoGcn7rgJmasw3brs5gk9GNMXjE8

pzPnN0a/YpjAmdFUXK2apAGqyLxvZotczFvx5EyVfZ
RcRuGhPiKRmQeCcZ/LeQdtSMP8Et4MLJIu2cJAQANQtnAekIu2uSCQJkSYy8NU617ABvePVfPtN0FHVR

mPxgUfTQ0vjPPAoc16dm62H4R6zybiH1C6WlAYhH58nm3LVRl0qgqZQXlyIQsRsyBVLwVD2J0NpeL2sZ

mulUEhla56F9MjVnDwAx03w/02DVtri20P/dWclSKU
ppnOh2tKDo0GDn4eELOz6Os3QvCnj/6Ec8hzgaxIBu9ssvgdUjhK/P37VZHFbHPzPSZlIWo2ZOVpF7fh

HoCEbooUgySZQeI7I/y7Lqa28X2eM63pTwkpic8+F5mzk1/c78+Yy3gGo8kb5SA9i2F18d9sL6d8HIB0

MP40YCxcDvxLh9IaAuWWas/jneu+fSDHT3vuVh5KoF
An5Stf6Kk6b9YyQzqKtuUyaEhNIycQMlPqFMY0CtOrbaOJWqVrnwUWvHFyBRIGu51KrmXGDIsudubPyq

9HZZFZUlpT6kPdlxTE28H4X+68qRWqDwZeICEtXzU0f0pj7oPPKcOxbGLcEErz+TXK4jLbeGgTkSdouZ

Jh4C2mAq+bVvdmx05HpMBWGWOGaf8yP3BjnmDBT0eb
Ct3v3jsxtwsKtBU/khddeVA46qx8c8monZAgHcLxk1niwefNdfE1rYfy6WTOSXnshUVNdBBo9qLcsj0B

7GEWc2kp7du6SVGedScwAz00Gr3nqHqPnRvw5j09siEG6q4Kjf2ayjSfKlRP2EW3P0Myj87ACIzgv8OF

b1s8NoBQ9W1FAnzCmBmSiGvr0N+OWCtUVVXaGpb8I4
hUVNmDcMzs155osKX7Nq8I6Q5vAYuS/7stU6NtSpSzAa6J2ElppvrcVwPxTgldlHhVQE6ZMaoK8okLwI

doivCUV1Gd4DUL4PZ2gQmkTFz4LjqNcbBTqxUlaULEHeBMU8L/pOZQOF2LOK25Y4SLyhljMNmFmprcw2

yUAt943prRGg0IqhXLvIHSDLnES37dp0x759JP6oaI
PPcqXkOp8I7Z7ezodNrPB17kblsv3a57YT1KU84p0uK/Ys0+jJyPggaLd9viMMN3dWGtlcrD6fy4dzJP

DBiMXUWG/2QZO3euoMbEU/2g4/wWR4w8+Z9/cm1gNY5sFyxOcaMjwJbJZH1dMZY7uIPrnODdPihHPrr+

YTV0zetdIqH1h+fu4HCLRhKmW/QLLgsB3fdVXdznTX
FVQm4YkmNPNGSD1icO+neAh/yEY3ZFqG0tKpo0+GbXsnf5r/i4Tf1/bf52yWKWvjBfsLjtRKy/PKS8Kp

44dW8jXaqUVnw0eagW4R+LqGKBVU0A/vesUO9XarxfH9vPJ6xfWNaj1mF4McLKt0dtA9RNGin7pkCudF

JI+UeI4fIA6pYl7ZMWk74BilSYfv9dA10p1ysz+kx5
M7Ys80xTvPrQ0MnHljXM1nfBqM1Ykwv8ryl8b8xYsivLMZ6nwhavJkAulT1iETwoZ0k/VQxb92/T038e

6OAl4Ut4DEhLFMtFmZqFWwwcaaQGh2KvvhAHNFnqlFHz9LmLElav87koxEjN3VVyq2Dd2wYOA12gTWS5

A/Wehu2pOCawptiS4WCmFsl7vS7sCEs4NmMR5YdfAV
p3Joikd+LvQW+zz4cgwhel57JspDVLwxb9WanfCk85POxMLZoU6m+FAqC+UGtSF/tm94Vj24a9xJdcwP

2fje6c4TW8OHDdj/1bccfx07S6oTGBJAZNPXfemkjKJBhnajsuuB6txcC5rPk1efdmKWQheQStH2Ky70

CUEtS2OSy3AY/HQNF5B11R01kJhQQDqezcU3BodaUv
5nzP/NHQnac6NBewTTWGG1B5R6JKMXXEmWtKyQdFwdu15Rpqm7j6D6bC3TPV5MQ2h4pHJI+LANjHOY2k

8nMDs//dwXHpG+QXdBjjI+eyarMxcIbEZV0iSl21Az9cpRm3RmYNK4Bgxdbl5VhLjuX9v7SpuQDqnXn+

mCLH1DLzZVxqosDqdSdbcD7OQlVoj5CdmR2DT+J+SV
FSf1MdOq3CzQVYJU5XPy5J6ELJaCXmF2qGGwOmUAv+51EJOTVfSwdgiwZSvcAeuSnFctvUpBYyGQIjDI

huX2HWh+3KiMCqnvU3bFOBopkewNmhFkZJHKCUtb6xF/oJGUioz1uVpcN8W1uADlbq2s71+YX6eppo1A

77caa8A3goo8O8ievkuyopdXOn66zVapfCs7BW24zg
igLcHqj5rM/CZ3gQwMioaQ6TDOU2AUKws9kw3+E+aulpJfhOmU38zFb2aYujCVokm0t7dazlL0X/Kkfg

qvNbWqvz3I7beQGhkMf5yR5wD8ZXyk756MPt2P0nDXZDrbZOe7yKV6h3up/xAS4b8j8WwaMmUUrCIOPd

TN/ac21WpLpUCMfFpH2xRfnLj0aGcPSM3lWjG/bDgh
IzerLuEubh3oDSfXAoa+p9CD1ufiKAKGh53pYwS7/uruTjJlaNCHqfDxXVmLkCDaytyf+HM285KB3utg

e51Hh0l57ozOCeQxs4Y3fMeRr97NHazGZ9AADMeX2qNFG2fni0GBVpfow2FiKRLrOXSKpORN26rRr5Wy

xgDJY6MIzn96J+kl0ykY9ZBjjsHrvtVN0I+UGvIaZA
TKMVT/XPbU2JK7DZI7Jj0nJ7sZ0RrDdaM6eQtBxuLL3Vu5FavZAvSvUYZ+70os/8Vf/MVf/BeD+evteJ

dPX6IGJIC89lx1VCQozqXwd5WZIw68/+XGGOHxNGf/ic2TGSb5Bu9XQEvESdOpFHz8DUN3yVMzE13Bpc

dxXgvwYWetOaIfcVRtI15nlcmkafoMHlPvKHlCfw2M
IVsY6lF3/2PNpxEoc4ny2rb8ltMye8gRRPLenBVUQfMohJ5yHciktStt1+CuzQgmkMIT/90Si1h1WobJ

9ifMulhdSHMQN6Zi96XcBBtifOXGrp4D2H+H7uM0zDb4gfqf3IjCiU9v4emcSWSNJvHhb2dySwqgDTxa

POhzgKY8WHmAiDJ/lLAi/rPcNHGrULePcoPZarVje8
mWpwL3/WIBav/PvBhbfPoASiTKDsly7MR6BFLKC75lRL+uFYRXURV5tqkHJVmy1z6pi0LpmEcnXaV372

D+caEG1hbozcyWgqBv04rFvYANATdgESp9RyJl8iVfopD2R/XNYAQW1v2/6lScKkyzfPUyjgFp0II0Na

kQ2z9yKmAdKTA1q/lvbB+MJhk9QDybnLl4u5R0OW+/
SezjooYa+TBmRn3O+y6EyJF26e1zuokwA1APoXvYsRremRHxNJ7kyasGs/TSTg0uH2MMpOO5+oubEynS

DC5opMmspxDaS6UxRgSvlIN6K+D8C1shVJTVmcu5IQexHis21BnXZAwtlj/+crBw/DpiAk0ivLgVrxru

mfdnRHRDQa3gWYeOiWupzk+qUe4U7T3t1zhXVNhv+A
hDBVhmCMTm+hOx+an6Yvl4QmhUbngEKV9ptgovXXZ/fk1TWYszjWQche54TLtiCRAiRlzre2Pg7DoXCa

D7+fZNaKkfRucbrQVXBN0DYYVdE5TCHFBfAcF58rXQ00FfAar+qur7gn1lsV/aoNRh7d74Jg1Lp6Dy0f

04RPngc3aSIavwePQPfUZdz1sL21KUulNUhr7LU36l
NzHza4JGli3U1wdiZGPtQoddtwhqDFUyeX7x6g4MIW168H2vjvSy1WliDWI7n6Sz9EM3g1lYelLLnWtO

3ioO9LpeJxdhw2IguQSq1bEks6opbthOnV109HGlTiCzIDHlrS4kd2U+/xKXFEoyku0Kc4tvFKP7fylK

JjI6PwrhyZJp4V5bYVgZsDIS1LHmCw0/IfSICaDokV
MYpM+JvRqLv2P7O+DaKcAovk9Wzc50RuXOF8T6Rf7n3n5ElTp6NnTYP6gpVKrSA1KtyB94VBtwFs7wdv

ASRgHn97uCGcx6JnC0K3sEjG4ym35Zw6+m7oQ9fx7JwqrR4Wqif9UB4aXCEOnxE+/tDuzO+N2lp3gpuj

/4WswtTADZl5gM0KlDyLafGk5+9WiziH8kO84WvyIQ
1xx+DL5lDg9EruEPo/v1pcjeq/cImGULuCSuCIjIiQ+jySf8Kefrk/nS02Xml/Eqd//hk+eCvplF0RZO

2KXxDahSn2xrfpro27duoNKO3ZCNW6XOfV/6vS7mGNebWqHmjeM4Tjs89ehGW+k/7Uac0w93vp5lkX9v

kB1CiUl7XbgBUKJQFD+gjVw/H1PKJoJO7HC1+VpCJf
szWWbZUjNksH3aVwP6QJ4nEr0jGTdlUiUaO8ZUB4PruQlj7Ot6gtFxLAk4eVpFJL7xzS9FzYUpm2iKud

3YghmMeZYkYPklFtipDVeFmvLa1ALCRPSsip0kCLKqOtvPaiNEzePIIU312dTE4DYwTF1nqj4oZwd2PF

/dGi56LDvBPp2zTfbmmy2u9vZpJNjjlL9r6k7WI/EA
yCkMHUB+qvX5+LNV1jw48DlagvlFsq+qphg9B+EPYBVoUyEomqLmAWI7xQ2fU8f+nudNkprGAo8LygPf

ZNJzoGkk1vLlDF2vnnWi14FG9WjJdbzMj77iQSm3XVn9HaiDeSe2qChj4MNwGNkeZ9HnfPxLNdIXgJiw

VvErNQrsqvR9w+SPr2xhAZo6kOjP7kNaguoZuob/a6
VZuYek2GsKvBRp2v2t+7p++vwTXwYcJKD2CrS8TJyamttxdJD7NMC4PYkqC/58/nOMa7gDUHGXMJK2TJ

9mK7kOYqJRbEZ9j3F8nbIRV3480/AUIQvdoKFtLdZM8jPOj0c6FR6200Y+7oOkLucvR4MXM4cFKqGB+s

pjUBiD9P0ehzi1hEYZzp00YRaTHpCjXBEeC/8Ztb41
fxoIUlcSYI4uJnvsKiQ45mKOgucRd+A1P1AIMU+dIgKti8XIC3BnsJWEhjnjGW0LaArMSi11WmjfrN0l

NBuxwffPljqrPK+EzcvtY3mqNrRN12UE4IQ/HCiYe6WII76NXovXxajvlnYHRN2/X2eh5rCVV436f07n

DJQ1SqDnDr2ShrMGKXGloePHeqyVOzu4GNAfFM0WIA
KJtCdVyeQMGcj5O6OD5npka+8/7F5c49NAOwUGN5c22Zb62qZRkOdFhxkrwAyOl5zcru1ebrrRIN/WAM

pqRJ8PlvfmExcVsid1zLZ/yPFxdeDdJcjyF2Y6eQ4GrgGHmZABPJ1763L1rv2/6HQ1YBGkz5XF7UavrC

08j6BxzpbByr3tilJ5h/IkFK8TT6e+wtEbMxLlgmqL
4T6ZgAQ1zHJ4z6jmKy/zposwCcVrnKpNdEQnJudPvy3Q8ThZwqqfgy+2DFLRU7/oSG337X02cNzbpeVe

vJxFAlRbcW1ssG0dWjLdfyp81d+Ba6A4zjI1cKjtzBWFxd6YMAFNQWBNwlp8SRMABizqYe6ytV4Iq5na

5HLXPHh647WWZADLUvZPUxs2Sg0BRYqxe35UeFMiPO
xljP9uK7ZVbQ6Edf+U6VpJQO+YAlLemO6a6d7e/mkc7TywGJlv5rMQ+SeIaTgu2Pjyjb3YHq5+Mi/w6P

AvbqcVyMAbeMcrjF1DHVijIGODnIzkDdqivjqjNkuBlWZjv8IetZRFH6emlAkSB9vnmdJqQ4kAn0CLgN

aHTC5tI3fYd7dFpdTzyX3Z5JLmIWn5nTf24BJ2UFNw
gpVDzwPakXIgF6vMYrVvU5ClmIsqIxa9JOyGLwQHYDxOwisHjHjt6+IDVNhYAx9JW1hdBmb9l+0qrxDe

BsbXvrbFIQJZPOHaCD45CaDiec2f+wjFaRX2+XI+9QFswwxtR1egOkdU4r1UyAbBks1HET/vTaiiFDaH

aizgFnD/yJQ9pT0q+977Bi4rOusCAC4Xp+jpMB2FCn
R6NI+WIGS4c8kCCrw8A5ueTubHiLEOzdRwqhbU1gO8/HeDs9soXNagxnibM5qfONqjCFJo1vIkjSf/s6

vUZWchiBt0e5CP1IA8rm5wJNmvQRfyLzGR4pddzJuMPN0fz4JvdhgLZqpluTOqNIykc4HyOEi4fjU3YN

fw8Y3W+yuRECzHA2WSEMeclXxZRFP6WCN3gr+rZseC
xyKHaNORs7nJX87DczYWkZtCbJ5AIjV7IO+kyUzA1LxEXw/IR/39aQd0bhD7Jg02drApLxoBJY7hEi9/

e2x+EmslpGsilmnygr4bnuEBDxh1J7y0drqB9e1CfexAA8eHzX6zqpB6dIYYue0RGpNuYPbSy/ufFbc+

iecHgR5jdJG6nymapYlGIXIXxqTiJma20gANmyzEkI
ashVM1uXrXdZriXb9mDbf5g/dhuE5GX4ayobTsyDqVqGXNbHauVUaD7AT5Nl85kAtxy2vpIfqE3nDv9+

lVt9k6FG33g44BGUj3NUoCNjj2PeN/b8DJYgkjB0sNNEc7NzSe9vOIt3CMJMLfbNs4tQL3+XvaGdvGId

ZmLX85I4U13JYKV4shzYUKGFwBSkOiN/nI/ShjvJtW
5L2JjPkIZJaNEH01Cqt+s6s252LthOmXWZNoAZaK0wrjHERu3ca4NN65DPWi9UDRfUOXUr8FWb2HDZl0

d+LD2BYiLwxT5LYgOoxHjtcMagBrGwXRLLMDDmtVVKb5oRViUsbLDf8C3q0Rwx2SfG1906Puhcg814hz

2flY8K7qJD5MfSpv36zkk5VUwnOXEgUi+9123jjVmR
Kwic34y5chMzTt68X5Hm+ANF2eCoQwf8guU8px4jBo1NUoh/ziex/yKs8m9w6z1oCjlQFQm1JQ/zAZdD

sToKV3o7e5xqIFogX36pIWX32Dj0lBwUJJWYzos/BJJJUNpPa558ExGgbiNxeMZbNsNnDYsCRLabP7fx

D5Yl/BHP1y3whT+hBMkflEa0yX3sv4Qllf7lseJ8kp
8MwgusYOFJ5dixJT3Zf9xUK6l9v8p+/ZBUlMoMyTGF//+NJBYoooSDMd4xsDSFEoaOX1NbZBADB+HVaj

W0yRTVC9L+jG5vtlo1WoFnAboPtNrkBSXaBYBTgM0sJL52UpMvm+TLEovuw+/HOOCJ0DNcLp7a6l5jjl

SA1KmenZjh+JntXGAPorURukQo/dPPUI9EEpQhbYlc
TfJddedYhPuRhCbOsj/oHj4PRCHpnnUhDyaPPkwMJ+Nyv/gfTnK3H75FU/8Ie/98soMdzmGmjE1tK34L

9Ft4JqIMVVGQ85zq0RkviChwzWlfLwJnOSVnGot1rlWL7x2QE6oEr9Jn8Z4Bh3LdZz3FZjGrhNI63CLx

j4XtGyqh4ohrlTPxpfIiUG/qxEuHi1Jm8PCHH6iLRY
fAE43fkEo1nW63W0U37vwLxOJyPRpKbt4Osq4Lh1o0pVv4ZCFMAHr1rEBSSZuy0JbCeNlpEq9Tf0iqbA

5pb1wZgBG2RPxiyz+TvYQfRZNd+tTVu8K6u7Es+1ZyZSpdX6xeIDdP/hyW8GhzUA9UR3o5URXj8T71oZ

MFhyzo5IHffSc/gungR9SX0W1+Ywq1ldHHhg7R0nHc
0jABp4XFEARq3Zz/q0esbDLS7zGmGBjfFIXUmcZzxzAmm6qA0sDPgU5owNiOEJ7U9Yyt6c44Nvpif9T9

RXj875IXVegzMat3QB4udxJHRo9CiGsAki5Yps+mo8ZImOj8NMBJCuVAP++acfVaeJOBbvbMA57TnS5Z

ZPEtltFor4BSlxLUEfNKMAdxX4pEjz46LMQmwL451p
r/Ysy5S3sWCV6O/Tw4/vl6hI0Wk1cdfPscPtecThbuxgqUALQJKGgrDh2Y6jsbGMx1Boo6U6/Xff1vY9

RZMypHnsjm5wj87X4FuwnQIrREPo5OttLQZCcUkirsNgr8owri9/uFt6jcRJROJqQ5bEhKfLb3y7bGAH

kFY5ZLIgGF8WjrdW5v9DT5si6IVEw+6SMjsJbw64YE
npsBmiYkrykJ8Rd08VkiDjbe9IBOHgD777BDe56RilZ3lo6iiUc6Sfky4wJQ4nSzpT2eY2qpZ76zBf1l

V5IEgErAHeXjp73KLj3KT56iYofSimLYdpt5fStvdZQd7PRKqOOLOPmAx8Y+oT7oZ4cGMhEvN1/ekOpF

C2ihiuQWhc+1FaTGfPYOql123csoUTbmTj2OgVHEyy
3/WWqFmzMJkCkA6UHsl6PQr9LrmG2ORhyYGVFIQfwk6m0njTbjuEjz/2+OT0zRSdmMio4SSEofDRW6x6

g0czun+itJd1KeVk7ImnoxFyiUQGE+Y74vAzLWHQAUFszpqokmV5OeH0IE8WJHNe3AOYpUl1em2X1aud

PyfVoO53Zi5Vix6ZeuZLoeD/5f9Vp3tRdaqsWOr1Q+
LbVDuk5K1osnl8Zhxt9W1O5tzC3BrCyiNs5/oyuSTjvWptqkNs9TtDzVcx0t75G9X59O+2NMMB1AQY0q

f+Gbx06s3WblxyxJ/Ke7eUiItIlaRWZKEpzrcGC9eOOSnVpMBfbikopkqyYsHZtbQPylaB32OiRLEplm

vLnYzISZUqPHX8/eYW/iaqDSEdn6zqQtH8XP9/b8tZ
7fxYHveD1fw8e5omRaScHqF8jRwvbmi5aaccLZP55hX3wRSoRWcFxhPvXVlwRWa5ijouzO92UIC6E9Ul

oqDBx/dU0awIwPiJUs8FgrtfZZ8kPWMR+ZPEoCMWQXT8yxr5PiNsUdy+CljhovOluE16YQU1/auV0ntV

ARnYQBQxfd8ubIKy7LiJ7Eztiqcx+K+iq9zA0ID5+x
lUK9tKrwmJFVs7FbzolvUkg2BWkDOEtbiR4b2QsMycDGWPzN/Rj/a5EUYv0Ru7xme3y6hMHatBa0dADg

nrA9HKPcd1eMgWiAKxvyR1051Yy4ghg5P+ZP5MYV2eDPATWDbGjbYxcbFpNiip6WnpRw86jbKrLzrJl1

JVp824xXwS2elQSTbIkxQlPC/+cFn0C7VgHSrIwnaT
8RaQ9+TnUZcInpmMxPhzQaVzb8aItFQmXBSWGknkb5Etk5SDddE2iEbl2V9VXMX9jqOhPHiryHrHEttY

uW4gcr3VWGWKtvRiRbzBRK5EgrxzcFT39QA0reax0PKe6QJjvvu7C5f7U2XOBS+xc9T+wdcv6Q4zxRcY

d6HlbXuFd/MmslOmol5pbFSGFrITjB7FteVCdGM3HD
hvP0KAHzwivRS5lTcM8IQUU869GvrlycJlYWtdIZLwBf/8wrGE6F70ln302jrXa1GyCI4n1RJS6sOdq+

DBaIbN3z9zqM0k//Kzz9MmZT6l1pfIn0oGgZXXbRPywLUcLGyq1fdeyY08iGmhJpGHG+p2yFZf7EvCI8

eXrWKw3cd5hQTFPNGwChSoCfd77r4Hs1A5JNZubkW2
VuJYdd+p0evTv+V80veV/tGODWhhCok7W24YZ9S2gxizoguKPG0Ul0h2SypLlhtNnia0lpMjGyef0zjp

J50UsB0ViM3NIvKV3VZ02ydZFLLOdfC3MAENKY5taILqxGIft5Yh10/YE4qqlXqIX+ofHs2R8LZv6uDC

xbuPKpYf23TdlJrIILDq5jTJrqA6TY/ak9X1dfXr2J
LOPnXPwxmfJlG/9Bx0/hxHz2foiink+Xsx0d4tNLjL9sh/IQFdqCTrVQ5gq7KJjnSZFdmxP8oukkQUBe

Um+wRn9me+RxwCit5CTq60FQooy6DhfwZDOuC9nB6AwVRZ1CP1v9afvubSTtBEhCmaNNT2S7ZiEDdFou

PPIe10DqkU9qe+fpjCq0bYyRAb0QDR3eTPWBkbxRZZ
wi27oW9dL9xfujaVxr8QbacZ3VDabf+8d8BbWsHi1DY7VG3rmkyomZZZr3wildej1g7w9M3mxybj0647

nBZUainyWbLElMJI3ILy4tQ1PWmWgrm/LSyhoDDq0aruQ4E9UQ8mD1aP1VvniTcn2Xrq/Tl9tqjVZaX9

71oa/HcRoE8C7pCIwfY8jzvamHZHZvmmvGss84E3eG
PkN9uCacMO+pzGyDdyqRVpYkMWwF0J9/FBmXPGSTfhG5vCDp/j7QpNJy1aHKWuaZZC5YRalCfuZkiJ0g

Ve0mt//2e622MPhYasts/HUREVn7FgsK1CCBDRvn9DVF2AuVn6wxaVW5l8CvSp16/WofiS0HuBp+PUnT

YNHQP55xa3950BXl+bZnlSRH+/U1ePpUUwtXpQ4CIy
u9vJZITDC6h36uLZ5iOC11xzRhA2Alm4vPJ2gTjSMGMZKZdMFQ5yD0ddogGvfXQNtlrU1FiEQdvIs8cD

LrXXTFJvcj/sb1X+W+BGPhmBWDJNwLb5jGU1xDJaMoF9AeySFA/Coon/4r5D3ICWnEskcpW/AZKGav9BuN

+LP916UoiH74g13jHMKGpZnvwWPlhtOs8yRcvZZLnB
iUBIBrp0cRgNSo028v4tDu2UqAbWV0720N77PcZ0qlHR2Vmpqk6dxSztQSG0GBSdfst0Tkc01UT16lMv

PFLe4PH4NuO/m8CL3HgVv0dvKOj0Rtc3w1Tb7IBCbQkGRAkwuWJLhpoXtuHGZL2aLx6RC0FiXxWhgRY3

m7xMP9056JPSPRr0k7UjrYAQpWH8pOOBelbR3sviMD
qJvAW7jwm5Z/2yI6iMzT4wLKf89wbMOViJj7UbvsxoMgmBjDRtd7v4e6LaqDofK/DKKAo/0stbASWSME

Q/LRFnv6AyGXzntKV06rA7IV+nYRbA1gbQ9ivTfTd9Z65IWc04qkp8yv6GJ3lfj9dBw+7ea84A81glRU

ec5f3YyaBYVFlIUKjYJ31ZQxd2KfrzDpARRolZOoiH
M/eVCU1nWWMpXPjrwymMmVtiYX2MDftf8W+Fbe4/auO0WZJv5VakJQP3AC+2+5Op4prLfn+khypozh9B

uKUKoop29hAgyYMBNiZZT38jmANEQ24zHyi3dVHNXX5dy9WinMhquWglTbH3g8amcOjg7UQWaCdcguNP

j+eYfNY080ZxLHQVSayOHC67vA68pqW3hnyPW/5DFQ
EuR4f2DSLb23XSJrj+W51rsCpTKRZCkH/OVjf0DNsJZbW4jhzq0WppRBEANSJ+lTsQ92CvRfr0YA2mro

7MWmD6mN0+8zCgXtvsWbGuLxqtA945pET3btg99jCFaOeVli6t0tv7hIt58agN6jGuc2EkD7uMYDdGtl

KTpmM+nfAJ8+iovr3L6+/+iovvqqRsOJRfG+KDjl3P
76G513DroQ51/zqk6wSAgl+Jjb+SNCE/y6OhcEdyZjwSs7HNXJ2QtsOA+7RV/JWc6m9pg1ffE5y0q239

fwpsUOWSwm6eOghLsR60OTN/2OqRKqVsZJvg/xM32dJ1PrUiStveLQz6nPEkzmyzinKBosZAjXUL9A3r

9LMkCtArSxeDcuROFHwy5+Xgp7A6yjhiHjh5LorKg/
o11lg3pBuGnFnz+HtZgoD8y4vlh1tJy5gyJ/tzYk9+s74X6oTlPmwkxZH7Su5XAdAWxNr+6ABaSHCm0R

A0GQxcpp3UUaZXhT8NR6aVjP7rTUk8de4H5jwzBQZLAeuwggrzNn0Pp+HX6S1TViuMg87J+CvRloodbi

Iv/IkdSBerrFFeJJKVVo4ysSH4mnx8PY/TZjt+6CTq
mGfdy8Dcc8q3b2eJq00uYSuJ4arcGh/JGs1YM4Cq28X4N9XyCq8ePqX6Z3VD4pHn3zxzzps8HSN1mH1o

hLbKCsVW5kykATE6tVcquNDImN+1LQLt4NsbdJzSRulCKE9+KVHiCeJWSpT+NPFWYi+LYb3QxllgohEQ

onMEteJRCL0GOq5LuU6irerMuXUJKde5bveNQZodDE
VcFKQqIfi329zcAdM9c3vSC6S70Oqw4hUehh4yoVUtCl0waHNIXPKo4uAJamlSnpxtGP4U21HMhCoGgz

j8yThAlApinAwvmNaE7MUOrmOBQ4pwnBocYwmFTNojApGrfPeZq++YgRbS55QyRHz7Cx7gKFeUhXafIl

Lo5+eOgDWMt/Fhaqt28OxjIm1QBYI8tPZxIMP/zFbK
m4/0hpUKqQxcIErDrqs8CvFgddNz2u00JyyOeheZH7PxUOc2K4kWRRON7OTw0jJGUEuFGTyfS8ho5esQ

D8sKBtDX8E8Lqvv9oQmT7mDbjeZwf71Tci/ub7wScFj3p6wePT8bHSkrUH+x9trDxVDRMneONL0MCvnZ

yDrXGeDm07qbKGTrqI8WQT4+eLIvGEK1GSeZuSMqPl
FCWH/Si974uXqQgGrh0HsJaT6n2nJ+pg857OcaMrcCmRmxINJHA8D+VBbHMi3sF2ENA6Yd3PGA9cBpBb

N5gfGR9ctJaykOOzGd85Yq4HzWzKeK7JtSgp04X/FrqJVbbwtIWAT30f/oC+59KFFhZE8IbfOZGimWkC

htEaJyD0hwlm6RRg2lL1EFNZ0y5wyiry5BNYaTofbD
exNk8WTQ7am8z4oFF0zCUt3KtN25nAllOo+yT+JqPTl4OBhsg7PPKfGTmBlMTms9xYc6Ec7yWDaVvh7E

MMkGQjkq5g9uyD3zAfH4+K9L1ve/8IXUUeu2YOyAjdBRQ2f5Sa2B+SDzAQ3Osaon8ZboTu9EExDg3gJH

gOaGvM12GpfKne4gwV9pDsVSvY8EZhaMlAJ5IsNxcA
2AjpEw8G0PVKjgUcexKaOI+r+7sPhoQ4tBaI+G+C/7q1zw3oQgNM71XdCaaiuQcrW/piFFOoxZ6+rmI7

8xQySBeA2ZQApMX4VQJ1KXz+d5V1kULEH8CerEordaKxcQf546kjrXq2mlpTT1g5Z4vebCVD8ZbO7IEy

uIcwudA4G6Q/Exj8wE0TCzqJrLpKyQygeVAY3AJ8ZU
DJl7+0LD9wASFiMGINHp22gCs5JGufIVAslFWHqLApxukjvc486o/eo+GQD1dCdKsEOlsEjhQPxwMzBo

g15uiFFu/xmrLWRrX/szTM9QLhRioz4GYgAOliTWfbW9w/ZATzxg0XkJS15w0lg8RJ+sZIi8GWcd6vPI

grKfPld9U+ZCN45tPC477lzfwAfm1qtoZj25+Yr/eY
36Z8hqxTVqd1GseFhf+xqL4v/rRywTZhfyOZB5ffCKPmlvX0wYDtj+ACaExRWXQ5Naf0h6YQPCyGkzSS

Cr2z/hZTUZGH2wtaZeIklo/UWxeN49ciAn0qOShZr6Ao4CfuuLFWsJq9EpEf9cD9ca5p75hskt2rabVs

Muf2MUM+tbO+jrrZ34UWhBRqnvSjGEXKpzndzCzysY
121T8vnRofUydThkex3KJ2xWiyyjIOmiemm2E4m4oDZi+y1vmXdE1zUhe33ZvHRVVof0x04/gfCPk3XQ

WiPDq4WI37DX6vl7cfXOxgtRiLyXehw885UAxXpUtZ8OR/YRb5Fg2+8hS2HOWWn3krlrNcuF7EfycyAD

V3/C0mP46QsNpniIVGzrjkIKhqpxG7EevShhTyTgzC
oOc5rVRO8Fgh5rLOIwd587ci6WO2lSqx3nOPquDy2orLnVGtvfAqztdxKQttHszq/6E6UaxjL/hBJFXc

ZwQSUZRHEQC1duACyluab2gk3UugGvnp2ndjstiVzk4u6GQzfepLcjzI9jVyZ7i2NKa5JvWv3U9vNEAr

/b6tbFR+GfhVGT2GFYCUms4etZ9HRfGqaltgATpMBP
oMWNdzfeS86ND3PYMxVq4twt8MaIzltLmKUayvl0emtphDU/rP6S+GzANCBxfE6vMCeStGvezvafVzyD

3Etzrnr9Nh0Sjd90PEpK1x9OzzlTPyMq4BoLn1BPtHv2DFD9fGMtgnvdwXJy09pxQbO4kXnRSj29rUzn

wj1lSen69s/wXPpq5MTeyErpirizC/IZ2aS0usrvZ/
vCw2udrgDfvaz2Q0nI09Zns30+KYXIHabeFvmrJUCbU/AKBBn35+xNNucfBhi8p0qb8dSAbrsnKSBWDP

UYxxXotAZMVnLzcpRpo8FeSBhIOnkjWx5V2oeAaTqGzDlPKcal+y4EeMJd1DoXEQum4KAa7PuSmV3ekN

WoXJU4pcPNNmH0ITBo8F0YnBjO94sorA4bAddC9nqE
WDaZv9TE2J3w60DQK89RdvByUSjlKX4TOJEkgncGm/WsaysQzst5G/eaackaJxtJyU5PG4jgvnltNMjV

xTKt9HZlc+VaO/Grftv1jWmmiN+Zu3tdeN1rlIzu4ZMoj+f1MYv6o5kXTMC1paQ4zuU2SJwcsxPxTsfu

STAN/tbtT4o3heKYCnZ1kdgEzIl7+VJrkoddt7L0Mex
cMag9aXYX0OjTqLCCo0G28nItItjhwqLIxp3VHCz+6Zr0ARtxWybWrTCKco3xK0yHwEk78iZA46S2Uu1

5KvqoxlZlkzkRxe7P6TjxEFLb6e1gtXhU8K2rH1j1GJWvolRW0Hoig/dRv5/Ril82TKIAoPnm3NHLitq

ykLsTJlbSpgTLikUK95FJeIj5xs+nmXAJKft7Zo27a
s9Y2J7XUHM86IwUn8rhwSlWeE9ITPKU1YtTPCNXFUCxQd/XSsOgGBmiqcBc8kY011XdNmqMNx+XEjdUI

a2B02x+od9WudykZqvWOQDJfkSGAEUj0X6haHN5wWSA+rmMnoZtN4XCjk2/hZwupvQWo+5zi72AI4gss

Lom0QEgg2+Zk9S0lN+5rApGvDmEXFL+lvGwOwUkysR
W/9FOOJ10Ft8FpP1APuHLEdC3aPwqd6WGtKuyL5WewxkDUuaiaVYy+D9q2Fx/TDdJe+H7PGnQ3GIvWmI

RFkle3fkaVhUuZnIqisELToa6mj2uwgVh1HpS04zSAgsbUFIVi4j1RB5TBbm+uPUNE9khPEpKbkmO0kw

rXx93F1jNqrvM87kMP9uaff5Y2FtToIAu4weEWfcGO
+Mm6cO3Yi9hZPXMt4UpX4ZUk/zbtVceotbwM/VdXrWb6cb/1553/5Bj89otE6R0mXdvkqobp56ViFHQL

wQYdOMfTy6BjH2fqft7eGHxPD4WjFiJMTd+eVUG0sBDRLwNQjbbtDNi1gwe85HUpgik4LF/xRQTs/Kre

OJajKl4SINN3ueN7BimxvO8QyO7tC3UMqd3uTFlQar
L2OSsx4q+ufHAt8xUL/rI1MHP+HLAq7Gw96GkFWLxKyAwirWM+igqpApCMevWpsjnOTCWrGS5Z88srmY

RZr6Zg+mZmYPXc7Ftrhp/IA3dZYxAjqsw58rKsYI+tpMz9hlSVOc+R7ICtjAF3u/GiYIMNkvmSlUpVo4

qonoiWKe06QQ+uhzelc3CAMURpAe6htIki9LqtfL8Z
T5BOopgAFOyq/xbwjIi2DKhhnbUtoIav+Q38j6FluibjX5rIcuvR+G6xVPJiTY987n08XJXmh0y5KE3m

bpKUTBWiseo/5gwMjyvVeQFLIk4GkE7MfIs9SntB+krsfgblo0AJpshUxNgkbfFj0fd10DhjnF5BNFEn

gR5KuOhrDFgb30GjnYTKIM9cJ5BmQlRXwMWtnF8Qb5
/QOmZit8vz31T/jutGhklD0ifCyRKZi7aPAhPa4V4hoBF545GL9GY9IaU8kmMEe8x4clYWpLhek3CEUu

+oYca6BY9M9vQ1w+ku6OEUxSkW2I64mLKlPZ4A5/AtYNqQl8BkR6A/Rukf1zl9zGMG/+zc302OhOCIpl

DpPN36lzzNNNqp5nDS+Xo0yAf4d0ro2AOxFarhjBxC
ZpvBPYYq76CynB9JqMFP9mJVfi9qHL+o54h4rvnUBxd3ps6Y8G06hgM2Ck1f5gtr+CksybLlSsewK43s

2CYmoJoM2tGlNskDmW4VrGDKXArqMg9rWcBo5NTEHy9Yk+/27dq9DEm4lkhCf2ZX/rSsrPGwCcRDoX/X

CZgq645BGiavASFBRbqNg3tNXbWf7XE7jW1/6Y2lcv
YAQegoLQYebWi2aR16hGsCjRm330a79kNv6btMLGK9szWWrkPxn08hOJs4wkj0PuyvTM4Sa9NaNSX8gL

bKe8duc9Rby2PTnY0PaK/4ovt80zRfpUVqwCSYFxpSHRkZJZXCfkp+taMPV3DyP/tiOfwrpuzor9RDw5

LaljG96uRdUyDGqMtAdwNJSInTfm3MqoTBE/0uxEvC
dWfipG9kvim3X8/C7eHGLDm0WY0ujWefLD4LxAQjMGW7Wi4LHY7TkKwthB6g5MdIGxsgVoZkXRtqfJIS

sGCvTVcGHlhjb8Jp9Z+vTetOPHgZ+CKL+gheV1CE7sO3cOdb/eV9FzC3YFSMf/qClupi0x7k6v67/UFq

6VkUVUKg1/2BBJXrhux/7GLB0U+3gRYE3ILX2ZmS+l
wjl3DSHxQtb3DRpgjFsrmalYpm4Bf+h7M3m5r7//31Rq50WTON/M/AuxMObsag+N1fYxL+UG5hn7vFci

W1xoBRWjtMHvj48LIMGzBAaH0kybJdm0iKCNhcx6bdznoMxdX7FVC1VvwhONsYcvougBcOWQfvh6lW8T

/kospIXSIrcM/ywZtc+xwXQyMiqk8V1Ml+ZJGv9mzi
54Gwqg3vx1l63rdXYp2TiyaYDmDUlNwlVsbSIWpaYojRmxkzan3wbt1LVx7CtnJtlRxpwREvEvuI8AlH

nMqEqR8HJXw3debVKiUKOvXdI+VyY8sAIfkWhHPe1xwvH7gxoAZujuNLu6kCMeZWd18fvbY8A2/rpWqw

BuTx7u6O34+S+4BnE1VWo/m5EF2v+JqVbd9K+s243c
UcE0RMp1aWfLEiKOZAIHl6YIwtj7wbzX4g6jEb0iw+Jt/l53ZZCj+nS2anQuAGuL7K5oUW3oljse3Ajk

2cIov5dkkWJ7JGCwS1jJPur/ts/nlm7EBxT0ZU/T/wZ/HQgdd+7LeQpxao7pydwqFmdCSFP94m8V8Q68

0unIzXVV7iiAiTee7UJ7jUgA9P0FpRywNFvLvQ+UgS
apipbs09qSbrF6CQn22wDbzKcwheyT0vpQFO0fqXkI/rN+gz9LblJstlJ8gPgLOa2dAzNjCAvSEjUSTB

FQkCkCUHXTySDtCnHkgYBHJmOwbqX1qlxsr2er0hnk9IBwuRfPNITeMbgA8uS2MJ1mMTzuVxe+9JxQ90

/yTwubODpNgugqmOEyxMsL92KONpbiHCFSyKaBgDz3
bWn3wrR0p8e67kv/+Ci6OjVurOl5HjbIwJa+7LhuwvYOZ435eAAVBeJy5fVH0DSPf7j/Hb5bOXx9l5jS

SLVyIiL5SOILDZlMw5LyzDzHJVtB0wjvga4z34n+JU+zshX3b3yV1tfPRtf/aO69UQ4VRrqRn7YVzYwm

SshLs+E5+X0HMz2pMC/esE5mUOEycSGzGAY5CJh5BM
FQAmo0khIQrRP4Zn9BEH9Dg1dnVWriZBjjVIpQAbnl+/IvvNIeoI1aMj46611kgM7fexipG3eYKaszZ3

IE6rR+GQVJm3PbKckTLNvK55bgE8IEOFGq+CyAFEDa1L48QkKyVI24AIFoXM8kZolom4WwMC4xGkswL0

rd+3G5uaO/25yA5tiHGJGtam6YMVWtAck8eRVIMwd6
ouxCBxdTHjwbdAnLL9K9ZU0kkqobMn/G3Kjo1JE3dEGMdmaZMwHKEwg97B1V0gLLzR5/1H/TUWCGK5M/

2YKxUIvT+0xBby3rBmqiTnGLJHJeIuxZfjdnT7h83as+cck6o3ykzBNaA1gcJEKo/QxZP5fn9GbVeU/A

TPNprhAcDQpIUIc//S81vd9dsJWehtknLXsFbIpiNW
XJ2sM9qQZmm7pnuAJ/1DYT/OUIsCzjXEfpvr1FIFIfgAW1U8H8wveW1dcNlQgy9Cx4H7jBbscOIjM6g0

wE+lpn3qDjTazdIy2LbEbMpHLhSrWYKT7eafuA9aeRNcNsm0PUts2U6GI5U2Zheq2podlBm7sk1/jdPi

yDq3n3MiMj6e7HGzzv1U4VEWRhB3D9llxphIh+v2f0
qplynmJ6R9471TpkAQcziq/sT+6NpaWiIdKTBuCNGxqURlzJWZWzqk+cFkK7iknebskwQy9+ZnnXsvz5

mLGBUQGIMfkIVoPaNyW5DLocKPsfXy9BViEpuTqrqWadH9rGaAKhTd8VhdCtaJxmDfn1S1XyeDIfgR/A

l0wJ3t9fWBXmBLMtkgRkr+uV2tCXJb9+QcOJF8ZDXL
HyySWXrLLhv8xME1dwFlFXZHPaLfWD1RtwP+2iKYurt8rM3h7Z+3ZfRjnKSiqWrtL1TikKjQB5KD0zqM

F5Gw6qkCEWReI6tjVQQuDHujnzaw22N94zwAgpeOmFCmF7Wd0JPhNCl5kvHkhOGT9MhJcn1VjzFqWySi

3FLULpcP3gvgtz0qXwap6JRpVd2NRWAzR2PqTwkwli
zi/wLCxVN/D9vvm4bKQ0VZZX2CQlKSAnEvZ4/RnH5NRA3pzCEC+iuzl7Ucn8K3q+xGthKbibrHPBlPeb

lD5lP5yjY76PGVaWYHY5xAv8ypNpi6Q/KI82t5yUSp4LWt1N6nPWpbPBRbke03g1xL4oIuGKhf0Ir9fE

f/OReUxGhKofg9BaF3o9mIL2ODuSqkL8HN/Zb3rFeq
kkGgkuJ68028ToPjJLTXEyvInLVsuRpdjiOF3058awK9MslLIz/rLNHtsWVLC+kNcNtf6JoScIuHdBRr

fX8U+Rc9W8zdc1rwG2hsr5y+TUWmF/nI3gHR2GeIf2M9Kn15QZ+BRihcc1Tt/ziLqiPYKOgwOBpe8DdS

KEtfY0IsAK73wQT9NSG2fMmy5Ogvu+ska5hPQWVBxO
MFQoWR1Mg/1dNU2QNACI258eUJME457c6vUrsdUhgG65AVnHXW6bcgm5ZWGMT7lUMDh/UKJKrzZiM2Q4

MBj6OCmYoHzC8yW0KPOhun3fpGo0gemlm85KF/3FNAOYTbJV0RHhLls+h/jnTRFaKx2gYukgl10B9N48

rxBruBaUEnnUqQ2ug/bevpsFfVNjlFEktHeSwzphp3
krqE+jbNs/kUsL8ApmD1CXpsZB1GIN7DNW2IPLumo79rrxEEHG05RLnVW6hY2btssSIyvYwN0c4WNgC7

q+v77kpVRI3F42er56oujkQTA3SO4Ry1g8SrsvjRiqv4tFOOr8d/GFX/+l7FHYagKIUmBGufjCL+THLc

LI9tNM6bm6v2jQZCecvP3fDu76Bf7Q5HNS/n9xjm1T
oct/+nE3eP2hjIcVc283LDtXyUnQF3jLLKMOOnKfcvSDBt+Lo0DJRzX8QU2AUyisnFiVS0d3DN1wjk8F

w6ke/Emg9tLDj7UszjqGfYG96G0I9iNf40wdzwnfgX3u31edldnjgzxJ9BEBrtPtJdpVnoKRPW+XN9Rk

NnylBE4fP4JIvJrV6DqC5WatLzrB18xYel64jrwuU0
7cLLSbpXcmvt2vOaU+0/6+aNOhJyydsCombFa6TmPGMP7dXz0VJdn8DrBAlZtbZnM48XALpnDwrcvdaX

rnBZ3zMLm9XyHDeWZQDuO5zU9bvbv9JFNx0t/s+pdjexkq3H6FZxNbZCdG9Kt76oA+7TjObYhNQAg50e

rdMRXEzIygIZrTbmRCc8rL+2aqVEE01aeRvGhPF/85
6Di4/c0uqY7qAzNlWCJCDeZLOcH3IisfwJyPxemKIThl/mfAZKJ72J0FXMV3ZZT5tWpjnWZ7LokJYVnp

DUCuSe556rswNxjRQvjbvaqpn+Kq5VftcP545GlIPgSWH2CZD+KwA+OWdNhi8joCwRGnGXl2ajQmANWx

bHQ3gjwa75qKFQcosaLDnI8rg8egZHRi8kZs+RNH0g
vTinmmDY0HH9YQUSaQqyr+BjpKsqxm27bnGMTBagJFGe8BpndoNy8kS5wYMxcr4P+yJ/77CupBlU+Ou/

PP5mQhrq/cGgZtvx2Gj8AHA5FFupAwOcZSO/dDjWmAqYLzE+279wC5+6YC2t96EHTZPM8lauoMbupzed

47QkUXt/6U1bFBT5dyuU0EHpyCh49jAUdC06OYOO/o
kTPptE644Log4/LJypO1GEF7AeoyidVrnXjouGSH+dm3cldX1xRnhUSP2MnwVXoQYlYwiSqhyUJyB24q

+7v4w5197qqtgTIzV6WsocYdN47sKILRn/HdqI1W2vqJlZj4XOeNHMEqvof8mxpLrYsuXgK+Yq2vBEXv

uXqgAE5iG1erMNZbIVuLwb99S1ClOaXlYN3AUxByGn
E7RC8zhCtxJBlOE0bluNus7/uJ0fN6HEJ2LIOkLzjqQw/tmp37LtqKdfP0iRYs3ocERjSV2y6T2Aw9/T

b4sm4xTgDen4Cv+gYLtmfzUFM4Nvylfl6Vlb2UAEgaq8iPv09fKkM2dqXK6Gg2i4whFSKVjssCrTuBSS

35O1Qb+g6deABvUYe7RQ5ozJWMutnNpRkJACkloJek
b8mcdihUwthFLDngU8Ya4MEcvpT4HW7EK7qxPLIUPR5D0EGAm8A88+Ir2LBElL+RSQiQP0r4rLGN2W4P

ESv1v06owPQh3L+dPyhnVoxFsmy1ce2uqw1O+1hA/4r4I6hzgcKIBHyzhb6T1c/EihczTEoCu4c3dArw

rSHd9H8o3bwO6CEo4B1gkKrTfWWA6kb8C0YC+Q4Z66
7/euCpxnAVd88a+lHV/l5paAXwLTimhpOsnZp9bBG5FzBexHbonPRTkiN6RSnxErg/H/LxUs3yv1lm26

5JuIYvS9OHwc9XdlLPtCn6yFUhFbfGAk+pD8xIykG/i863qH+bx5SWUGR7R+ufFZvSubNiwzLk/D8Ak1

ThmdpvEZk3gb3p3ajo4bjDSxD8QyaVRy8VBq/Xe6Dt
xVRLyP1lbORm0P1rK9u03MwU1gNufPA1U35BjaQZEdBt/miPAg0JKRSssx5MQ+XRTbf0SeQODuX6hmCi

84qLkrjiXR/1fkCIent5cRImXop0Q2Idr91QN/x7WiCh6rrA/D9lgwrsbsXnmQmRFWiFZW0pRqSYdBk6

7pEWo//c9ivMviu3hBj+Fa6SU5BhgIjxng53l+m+Rm
1jWD8gPP1I0pt/3yzm3N/4TjRPhDaLwp9Y++8OuLDD40ag+11hNCVWbc8N2iANy+Qo5ofN75uVOyt/CH

cTb4pbudDDj1Vk/tswZKbMzQB4cMDKb0I6ARvYrDHJ61EFyusPbdFmHvT9z6Bqye+z6go+rSHyRfe7AP

9AKp8cuUqTPty9O+lnNUejE63kj4il0p22+gfalfXM
lmaA9BrQbIaNXqqsVWNE+2tKSr3WRRJjSL845U2xIvCrpzvbFawudjuJEkOcOLlH3CfwpjxC72MJW8pJ

mAIjTf58Sybc3//hVwhBMs22fKmzyJsQlmVFkU6KSWML8ldyQ9QCH+MkpVX4Et5ILl1a6JXg5zwOTs2D

yphbTQ+mCSntee3H42olvRnbP8Md3qMwWhYQcvhk88
Mnd+RbMAWavHgM+Tv9lW79kK/28lJPL04kbNj238DqAzLc+JUFTM9PCobDatIGg+H/2ILuHzcJ+mzaKS

uqzsPyb78/xwJEQf9w8MDq2++KlmyV7+AumIcyLEBLRBD/GvOSBzqvOHgGbh+yIk4bCs/9WCv0d4FtCc

bQWUjdm0jrVP7synwH8D45A/KchIAs/oEBgg8/Ku2H
cf0nQ0LzEFhC2115DngmKj7cSrTSNFdXEkzazGTXAWrB9Jk5ETkopH7u1i3wKTHRO07sFSiKhzP/6/v1

GrLipE5h8kwx5yC0CdOjlBzO22zBZl3f4KSO8/28uXKc23Qk8RjONQhMddnrgSdy/ZdnrWoM+i9dQD2U

iSjpC4nMpA0j5nKcGResC4d3qidv6KhyhIHwe+/ClQ
30dDpTdadqnF//r6Y12lH/9KVe1ZxOJw6PZWjRAo22qyg19TkjD+CknK/oQM4VF9C/oq1KnATtorn3BF

Jo0bVvLdMf5EmLe1RcsXUR0tAkW+s9QJZafZqX7SDr4m7ievgI+3iLKExnAa9fE+C1uCLwdjBO3bzoHD

637rOrodohyLfab5xDxV3M3CdVzx5Qkm+xvLtwSyvd
Mhwq6yoJqBeZHYoNYUqhpIjDxtSR09r9dxqC5LINTu6mekO/HwcrnT83mpqlKJPgOdwLrNuxiitsZWVg

/se/bkA/VHBtj0mBYj0axMtv3pM2yaWYlsm+UIikaQsy7RgsSst+cNONaxDyPa/rDzXQ+Byly2RKBcxc

7N6+1I901GJ/vRkvJiojR0vMX9VNmyVwLn2FzEYPvE
zO44M02Ar+yal1ApMRUtlTHeaK0nqEjZ20/hV3OszFrrtM+PGJYUJ9/FuWhEgaQi+Y1MKd1AtXSFu1Hr

Fb6mc5w3Bv3a7jJ2YQdADn7ea4B7zksptiQLSkiS/P8VqLNx6VNCkeDfHvxEp+0qz2cpNzN0OsjeLgg5

0QvSeVp8EsG7+4o98fIcSni9VMFGCUiJtBKllYGli4
vOhtw/+VutarJ017yPNxv1AQD/943jB7df0iQ0kce+Amlq15pyO6z05kBS6sCNMTSunRuZHqpGHloALp

83HAz8c+D8DckuA9K1PFDFDxwlXBukacjeF5YfTN0X7s/uWU0ieNGW3h9hee79dYnuUrxfbd5ykHGQi6

UDKLPKr+MVkBkoETo0CZBV/ozUQNY8lfD34x8KSlLZ
cyiI2Wh+U6eTDoc1MnUWamgWDCd4Fv168W8YiKsdOJc4ky8C/6bdCUniNs2hiHVjkq5ZIUIj7BMYHBR4

VcFWrZpFRZnXNUa2bcwio7HP8JuCUhzhCx8p90eZKMIdyOqDnMjxpC03kvVKiAJuiuvyg7RdgNrWl9+F

uXF7Hi+RYniWRVQnckuuxzGbvGzo+vl1TPD15MJRWt
CzLz1ztaW6+8x1rZacEUWj6vqgaxvB+Xv4HGH+wxEK1eLw6U6xfgEygP7LTD7JN7XlSb0Cl1U66knY73

gL3JX5itKU2ZxCxX+of9KwpiWN1WEZjagWTSLVTEsRiUI7rdJzPNs5pbRzoX6rLQO9cb5ZDQ4tfstmQr

EQHek2Vt+YBOOaEaQ8TwPFsx6f4JurVEYqMAv9k42M
xx6A2EK5exvkOzNhb5lEQdtl71EHFuYwmS0Qps2x4pERn6UIYwYqJXPkhpixzDmUab1L2ig/yZKvl4O0

ww0Tl2z/RvWCNkDR8pAPmHTFm5pQ9gRY3fmz+td3/SG0TvI2lk/qeO6DXuZQobwk47yewKFk7ygk7pf+

AzFRnWXTFdDNEUSNI0//BHZoWCMsmn4GLBbqu+ZQak
IzAvNn7S8hW3SLN3gIHc75rNaPVVe/aLb1vgsUVvr6lJzNvRRo8gffLg5dU0prpuE6xsxW4WTgG6ty14

k07CMrIS+IfoWD3u9kQzqvfHS1xdQdIKxhBGiWUfurbxFEE047nYXwdnYXrUx8Qwc139RWRj26ua9Nzy

EPt52TXbbFDJICTpB/Zk1Ta1YKNMztJNqC9KpIvu/G
k/UKbWYpe/zfmgVFSN0PhJtlNzJcgFAuWtM/yqrzAjjRlocwezWL+0OTJBK7pWA39Mabelj9awksPQEV

4xmNriuDH8CS1BPT8EeUYWVirmU1Ku+ZxFkJH1QL8EBT9JZ02KmOd9taN120luqkcAixE/TzEvsWUaEu

MzxKgbz7Ks+Wdqx+n+ILONM+l02UK/35g/DvZrRC2i
RUdHiau6/PVBaBOgp6guefGHg4LXdE3be2HgBSxbnrn+JHxSiJ041JM5m8kgq5n5jIo2r6uZT7jlcvWo

LHBvbYWR1w8nhgehisuRdWekOEjC+0z1sTSpO3fafcyQvZ6uamG1uxOamiPr4g0nJZnAwJOby9hz6h/T

+b/g8v1DPghny1uplBLOlt+N3iOQG9/kMc+nVeuXdm
TRXW9z00Hwdfr++Rt7BAuKhedfEsZjhxHZZQDuh0K/vqH+s/Z1vhsyOcQqsD4PZXy6MKeGbiyJGDs9NU

056DOc4QfwmaGea0aIhL/sjxqGKUmvhGeKMAeZoNIh7IDb0Z0A6kiffNZo/MQp8cqBTFCkGrSARxqWZo

oVMcOZPnyGE4ggVvcxRKX612eF93nt1Ro/eHEJwvnC
pJ739I5YdNRg2IolWjAgQ4DeCr/F2RgAPm54xwzwhrbaNAuobo3MMbzO+uI5Hzh4To97d7S7OHn5upjx

cSdeqPgri94+NCH9ptaSw1jsWFhASOglTqKbO0h6NRaRmtRUBdYyzb8FJ0BcJC3mFOGgFgCJv3RBajlA

aWLjaWpBRrm9V7aRliTdWRSmc86iYMR0Nv8x2D0ShE
NQdB+scNcsSToy00lkV8cYDa7Yocis3UG4RVWNA7/I7fx3vBV6w/o2DtZ9I0XOxKxr7424NVs23pXcpx

MguTiemHUbOpgxrjaT7ZRUeMlTG1u1CwAnvRtw+z2VraVj5B5uQMafF6zvK5+y2Elb3nZokay9KLPJp6

U4zVE9Wh3Pp92wKMN5qmw25CAhZ/FC6X4Mk2jJgqet
hEILza/qcWFRorrUSeubpQEsZmNoU4JqYVlHA6w++X3+9shvsjHA36Z0e2DYtpswXYgXQFho7RfYAmyX

tFjJXAwc5WOkxg7q2DIu2DHJWj6n/olsIvR6H22jj6Ew3HPJAzcOVC1MA1gs+5x0nXhpiHgx2LtgEsKy

29e+AEFTrj9JQ3d7pjb0rl52jT52YLogwmr2WywMwN
u0dMUEbX3ZfqdRMrlHzuNZ3VXm6xnh3m3yPOjq4S+05FGo+NcdMISk9yGc25fNhLEkTcGGwxFsky0T7a

Cz82kNK8KjrdgcEh+0lVKidWuREqftttqE4gWvocaUsBQmWyntZ74nTwiXmG0Z1QVFNwfPBwVeK35P1d

saTSou5hJhPOcfDR4UOWldsUrPAuX340vMG48ZPfKN
8ttzVuXd/qlpbNZCJDcJjRbT61XjsW832oHUXZ6V4nIKOrRqRUtC+N6+XX1o8YdgVhcB6+G8xsZOF2+Q

lL+huWCzk3Q1Md27RlJflJ7BkwphWs3RhctuPjg3ZU9+Avd0PZbZqjCXOctezwRncld+raCldq1ctqdZ

cT36HbMy0vVAvHO8eS7Q3c304w3HEO+V74NoiSCUWa
RfeGSA7ZOiZ2KivizChB1dNtHf5YGVwWvIRmr6j13QaGiwe7lEGZ0inDtStBXJVg3Sdmh7+REP9Chh+G

F7g0LSLqvWnTLA0enlXGVK87vzP2dIyexKiuRwV210K0gKinQo/r2ESVIJz2tWCvIG0tXN26IUEU0bpb

dBcJr4bMBt1aLutc1oyOLjbbK0U75K6akuix908TPb
cffwJDwbpEnpZ+hnEvLIy+zjCKQU34c7qWUhgtGXFkY63Cb4pLbkPP2OD840hEJ9XKZT49rgh7cXmra1

SQGJT77klQbvBP8JFyAtygY1IVZ4J94GbcLO6//N4VrE7U6jcbKIB8zKXMCutwQVsGUSqPhmYgo8G3wf

Q3s7ig/QFo4U5rdCVHchCpLiiC5uCY2PSiQQHHl8Sl
ozRZogVlX+uHuzz7PXkO9chXhremm5DNMN5Eezeoeau6/zPfPu0IyEsdMOzJNChuH/xbSjzoxCtf1t9O

z4bNgqfBvtkSq7MFaXNleWaMTcYaZ3euH37Dq/8wKOH6+ELaxl3uIo74h18iIg7YAKmm12cgmWv0lPTs

Pk48dpUUECDuaMxawRh0NvxAGnpjZd+J4KdeJpZfIM
YS2KkdJEQYnJ2+hxKpU6gkNgEvUPNx8+IXCBfaJHDf7x+/3idZeIrfI92ienA5LWIgjvt4uHuDDL0HdH

RnCp5Qnf180++0UAlvYfXWErErcGlYczx3LfAFVn+OKAod4f4Tp9tMspqK3TqySTFF2bf+oSb0pIiu/F

H/8UC/cfIKsXFwthvQDArgz5cOYNVxZWuZ/zOq/AGp
4RJocwozWPtww8o4qRRj0xuzQ+11/exim4b2UdFP0tUXlgGKp8+xrPWMEc2XvqLIHfxGJbS9qiHdGLd/

2mWDMGQBD8KRLogWVz4JIeEc92VjVK/Cs5JjbcHSF4bk9vwAI+0NDtF2XPbC0c+ixGSMhakBfODd+MvC

JUILZxvWWHeasI8kdJ9Rtz7L02CrN9uRsGUA4uk3mu
+QPVMDZycqg5UMeou22929d1TgxTL4jKtDJJdI/ytKOYZeB7zQFIf6jn9v0d40EkyXDx4UfW8gaAsJHj

69kDVKIIXdUZgoGTJaU0y0v3zE6WygfmV6o9CDjZsexJPAbZUUPAw+CV+nvZb+91PMS9KTMnoLCLd9Xy

aqQs2wZ9ZlzBFHP0xeNfsorUkcWqr4zsWPzlVqL/OZ
aVgrI22X7m9aNP2b8j8p6RA7Fs1Oo9xrFYRJJWC5w8h5XYCpKuwdpiJxOgM9V9sfgaQtLgNIjZOXc3MJ

5D3GkoZElsLwlG2KAZ2dG1lpqlH7yg1B04tAgW8HspZ+KthX81Zf2RUupIXDaLbsqd+rAvLj0ZkiVnwT

dkzA6WMRGBgBrj9N2RewbUWTx0FIAiffAlQGwNSRsC
OyDeJnByFyS8aYB5FcYBkWODz6DsADW0iuuucMlvs8UYpnBuNxs3l+9cGpoCUuZl7NzeU6LeZ1glRVg4

7zTzcjsATVx4nFsTtadOgUKVBEidFvhdcvn+MdfTl2SehiV5UaCv9//Ld0L/f/hn9LuKcx/oczhqy6te

Oet+sNfodwuw+lZq1peh3Xuw+kQipKddaVxe2/+Mrl
7Eaxto5MbdI0IS3nM4moz+9gIBCztnD68NxCkbIc7gtZUfKQqFqCR9hIlhByYwcYhx0A4EP0XTT3OwPF

ICxdKSCQuFVUum+qBL3OjM6+WjtKuCPLwGydleKdYGM9j2udDaUuh9gyNVsIoO7CwsSg8cP2dnUU4J8C

xq6b9cZOz2FFudufQQmFPbjirUopE+te99ZGvi03Y7
+tgauYcNatb1fmleUYJYa7DLeg8PqPuCmIn/HtNqlHhGjebvq8aWgridk8mmX/eLdAN0qbK+rXcpVUvY

hHGPB8Ut6RyR4+a0gKbmMvwET/gk8+gIVedwkcoaIKQ1LDWpMvwFMWEn5WqHCyVoRmoFHj3AhmwQYQM3

tqngEFJ0sA8hEaZs4ot7D+g3WOqA4pM/PPgbrJuRUI
TN70+cstk8jM9kg5lTmdkd3T/i2Gq2wLOmSWxDggqiimwyNtp4XqnaCgiQUa6Bkd5jD4Gujgvrin/x+S

7OIZdiYGtdN/nHyOohtWlGUHhJ4fG5Aste8f1UZeLEt5M3DxNCc0QLr9BFWAHF0UO4ZQ9cEstnXot2rp

LgTw375okYzaB0OwXeQTSH+qZbo5V1tn83xNWlALMD
7rdrQiiJYn6Xsd5kdrIBmWZe65XuAkqVHowdm1zienOktn81blrAzyL97nkr1gdwHGAd3kj1A/8Xc28Z

FFfwv/pEUYdfHCZ1HSAGQtlA0NUR6ZGMRdCBfJJZ3jZvrm2qcCWTREYqLbvLGZ4c1CH7/O7+g4w21ayc

W1u1L/cQUbB2odyjEs+h2t9bqW/BMXfrtDuDna5dCp
+knbJx0vhOkl/5URbkvvWCpM90YQlXwFcyBaNECLaiaO6mksIjZAg1ORZmKdJ+bKOloDQTltmGDiGOFq

4DidVjKARH7rytQ02TM25hftddtWcmEWr21yVRUi6fs3Jgz5p/csT/aX3dXNnBvr1noZrkHqckXHyylg

X6zbO6wB+OZawmwSVoayrvVSpPt3bXWF/EiqlqWwcX
VxYk5A/S1JJog7yf59TQ4Pyx4j6P4bkpVMFJBlctFF/ypplmvGGQnl2YV5qEBvY+/s8VjDd/P3mHarVc

15xwUfRAkL2jDdb2HHD/Rn7ciH+Q0To5qYBVlvQ3fk/xX3cuIcr785gi6hPXxCgmxd/V3/Kims/oHvBs

m27199F8OQEsGPFPjwmg3AT9+ixbvrWQh6EPhkdScd
L9yovH1sp/ufOWWKLJiWJVaH3L07hcvRce//zYrpNZZK7xH22POavgf3by16iiDYRBQQvRNLaLRPt43p

6bt6JITb2CUtqx/v1GkNujrTcVnAitRZPFsaR1WQFi7ljzsvC9a+Da4w5a9/Iy83EKXiIRe+S3e/Z7Mt

C3bIhJ8oNZs+To/vK2SQYpp5KPFbN+4aDaE0E8ayvJ
IDYFx/dAfMB2gKpIG4Nce1fcgT3TrScqRVc48dQOsKqYT6MKUVxzp1STJBVopGdLOpDRwU297xVX42gl

R9e1tJjYsf0uTMFQdlnl5dEJ+M23TQRhqvPP/m0pDxleF4YvIblyc8Gx4f8zHtSAYFC6WoURO0m1xlks

WrG6R/VuzsmbrfB41J6L+8SKTsrWY8GZ7eg/6+9WVF
QQ6OBrcz+PUWzmxRz9RYwnw4vC0OCj1+uk/QAP7UQ/OY9e5ZyhNmTFK0qKWMqWTwdYqYC+f1npVK6s3m

6M7pYzeSxxNaau0InM7lpWSMqDyec47h3mOrH7eilkQ5aDOov1svYNwE8cpddCFk0Zq7hPXhyFwthF2M

+NroMpzFpy4RWslZysxot/kZLhzcxBQxttn+J6rUWc
igZT8ly0G7nNLKXdZ2YZqHJr1HCUlWfYUDxD78pPnnMZqHdl+lCyN0dCk8xqo4KLRQUUne2t4LxUqTkh

dprQJl5Z2DBeoNkjRZGl6TmnKICFYWCwQWoSbVKLQhwR6edF0rI2WJyjJDFGprpFHUhHl89Tl0K17vMB

Z6mp5Ci3iZ4gxBj2o3v6Hjzvwv/dYN74eKnxDPsP0y
/lnUK+hqg7epcB2Q19RgWM+YdGfWoKXGjKbHgP9Kj2Mhw08YiRpEynooUh0K8tLD/dW9fvwyOBrjWS23

hqPdYiapYLivWm29kt+GFRoR13UXoqbxkogwC1aflII43tjEcHeqDAKl2a/of8D4ayCZcFJXn0B8H35e

1uA0qRaSq31gk4KemOosW73tmZz2KpPLFqcyNUMp9a
i+ccI/kNM+8ziIlaYLcX0rlFwuNhqny773bWwRDnZpZf57JPoUPNTRtVPpY+2fKxzGFyMKcr7q4yxtwf

kYgJu3AujLKC1zqXwgewGV4tJeGw2+C3S5CfPpZsuPs5C/AtBLilJEGriVbbMlrKle8vNjMk3r6KNnNX

LAXGJuIBidVMXN6DrFArYzCa8h3WhD1r9iD5CqcEOa
PjJYp3RkwN9/xy1z+lNehys8DtAhunjqXYMQ2G+kZyQbZOlKvbK6VABd3TN4FFkIJz/iYab4iny8G5yx

Dr0O0Inhaad5GWDiwJww0N8BMN0eiJqP8cacmstZ/qeLqwx8jzjDTqSP4gQh1Qa2upy2LnbkpxMtXmxO

5kckMAZ5jkVwAUfHtUQwzzsSM+abmGT4oVtgPK4lMF
iq7wC2y6sl79A1u/AO1CUbvApGWGUdDRuISkZqvR6hlcNa3FAiKtH1j4IfADDs41jwFqtx30avl210va

ZxdkAeInEnzjBBJg8Ay2dfNP5N9iHjFAruZjEm/cLdBEqfXFCvceV+5EJVnNWp1CHsfqPft0mlLG2eWq

LiVpLspfqkV6jYiv2TitBBLEErOKh+Ri7JIQDKAqaT
Hzko+LkhKeignVpiifEWQG6hqczoTxDIjHmhBLXKP8yU/ilU9w9vI/dyw5/JyY6zlwdPo20XS1N88UgT

dLSn0byehf429+QKl8Of9D8taB+UXYOEYCkMXOiihbUfSdkfKVYYrppRxkTwsO0U9+N1rbKAkys4WIbI

UEwx0c1K3bODAJGeDKLmx2Dc975VNFqgDZVQDt+HpL
3IddLFa/cLU3PP6KAT8LdO+/qryvCfSM/MoH1+XHeE07baz4amQmBoEBT0Yx9qdn33z3LbK0YWBCVojr

9L2an65TY17l7rQlXAQ/IB5/hGhdaGRpYqp+5esUyYkimOGUmUWuEuCREO7iqtx/4CsBkr9bTI4AWN1Q

O1xbSxeWY5U7t/YVpFTY9s5VBhkDYMRkPUmfjI4x2p
EcZb+pVZlx6dAQFz1xrTh3F8U/U5WxHEKnt7wiiXjtaQrZNjeAqlsts6b/oXRGlMOSxpVOfIrE1uhiQH

uqQ+k4hikN8f7tuOJ4ewid6Auu4Ilefj+3tGe7k69qq/CntV6peZPcgkVaSJSx0ldIe3evUM+0V2TosB

Hq4utT0wCG1t3J2O6VB8ztJeB/dDbs8UfKHCNrwZG3
6ixoQW+YMhVgFTRzNetIrwK/DiAeds+ByTqAbo1L0MF+oFCaUffC9YpnLj1r0I05mPAaLMmpA8Wbx5tj

4isP0Iik6zJcC/WmKlLhLw7SMk+8ZTrT8a4MvruRXsR93TYAUHX4r5vyOs+Eo3Gb5FzAB5bBC7lJcjXe

WjX0r+g7+sumnZ3nSwpN6QwkM1fjd63XLXx+/S3BAo
573g7njd5f3o3gsorSkTsuV/HvPQWzAmIOf9CY8xdNA34TGAS2FamwrtPIsEIdspQrg6N747QtDddopn

c5H9vFi+s85DQ6ehPONNKpm6ybD53KdRsNEGc1zIcJjmi2j3OnbDUs3CqKtfF8dV0M+0Y7PIaxpu3oph

Y9HhM/WyKiHZrGNC/J5OQN3pg66fhrL9G0rkUaht/O
nwAsHyEZr7ndy2wnqjmKF3lab4AgAl5mkuyQ3P82jQEZKQ4fxSMf5mwdpkDCFxjMM8O/qhgMzxg/i367

yJd/3ZOeEIatyNVOZ0pPfd1AX3kuex6a0Ki01q7EXhueg7D1KgL2LVIUbi396POUL/8FtNxHDodB43wd

2U7T6+yefXUIuRitFclSCozyGBvKIuiwbv3jmJL7dr
xE3tnGrewxAL0gTSJKVNmEyyfcL7eeiH+jTQPHb+Sh5lXxlLkbgWgy0ZFdw1FYMpxNjlhdWPQj5hnex8

yLKOev1uxyPJ5v6ohhVht2eRAy1TjUi1xROI/5/o5/f7JxQp2TEVT5CSuFsZt/d1+q+tLGSogg0Lo9Zj

CUSJp3sGNgeKvwl10DirYSZ3S8Pn8tgiGniKGkaIe+
ryWwllFu2FuakoZr5bvKaMhuL2nozgZyHmiOpR+PWDXs+cSeMH/FxoXFWJFPgU+9j9Vtpk0WpIqNuH0v

GA4jbFc+JP8BIIK65fLo+2F4BTlCRdn8HSIfkcym7R7bvbMl1gMGJVvQVjybWVrwW9jxirkLvRGkn5uG

r3vggg37FmXpgarFlF2EiyGkuPWu9C5Jveus0hcDRy
num1kSKgkg0frbO93pOPBL1vC0jAVOyZBuMMuB5D8urXpozUZLX19QWSTExFp5R6swHzM6Og1FqissYN

dlh/7vH+t3j5WFVDAPEAnN4GuDt5jBDzgFALh/M3oPrnvmTn/eoD6u5D6+p918w4+IpwIZ+P7RZqoSxa

9Nb1G+pYKOtf1ZMO874FPgK/2n+k55RC+zNE3eQXe9
UOf141tFUTl3hz2nEInmBT+4rfaNSBEOy8BDx2r9Kv6y5qkB5pA/hJ6wWkIexodgeLtNhUp3rjxCsmqp

p51XimWzI29w/t8yOOIgNl8HJY+i0f3ft0AmvqfQbVnUC9V5bTdvEh/NY8srAb63iu15Et75AziVP5Hj

BJcu+MtqFXnUonrG5IGLudZFxw2x40wToGcwhR1hJu
Hjxkoh8OPDteMq7gm5J6fwoUU0QX2MW/fM7dHz7ggbClE6RMtBRY+7eddM+/F4aU2tXO9xIzcDJs9kwg

QkDTB/iatqgDdmrl/R+DG9r/p42i/eL4POZNfGWrEv1lCrI8wRSK7iux+KEP6gthUFo5jE+qohuUg/3w

NChBjxizOcLyKPafT+DdAwGF1UxPS9KoeZ0aod1Zn0
P6VX+CG4bD4g09CGm9HIIe4YLj9BSMLCIQZryWfC5Z0BRy44olugYWEeN/kY0kftR3vK1DhENpTea7fY

0eygQHfrHq3ccveIJQ8NLx6QBoR8+BKi13tFTg058fB/uX1TxCD4spM6+ClZuB7VI2jyeT4g9qEp1re5

99EwatCUL3mfvlnkQ9QHRLmxNDrcAM7h/XfMkDjoyQ
fXCLMr9PenDqy8vrehc56FgvUWyjrw1/U35/O8uekYvKlHwdamdXisE3mXFg+HlLBd/xyJ78+UFuNgrx

Zf86p6uNPb2MoPzDJP6/yAwXjVcddjYwjQ9aTtBguXFO27RVHkUjDu0I5vwGMmy44hXPvpwk43Kqixc6

bt2ievjoO4LLg1mFAFWZvG3RKVOoOVQ8kObu+pSiBw
45GPG/HRqZWiIDvy2s8aEFAtiy9C76n6iTawiX8KELJXpcrnv3yMbmbZacunzvH/dmaIRR4SuMR4WjGh

i0f8lUeNeJuRm8ZMkFHwPw80T9XbQkdlV2Vz9vBdhxaBGgZeQVSqDdTM+pj9mEZLN/hLLZXqBe/4KAzi

XUT6UxLraH/taLabCNBgwQtLecB3xytHPxZQScW3/9
23MxQZ4otTZPOjcsK77QhCGHDWLxbvCaRe8Jmpp+c8tCXzxWrLrqRXz/DMr9Bt9ykZPUpE4m4NQNWhyr

cgaPM10zwLLTqFJRAHlK5Rn/VXy+GaeleWSbzXvpiKPk2jKP6ARbkVxodI8lnrlcN97xhxrUWgRyF+14

eeH07ZZY/F6iIe+04HevfCwRzh2q+v1BDJWBAnKKfb
csNZnYUoqD14/oV1VI/ErYl5Mngd2/yaemxDfgAHOQaPrMX3SsxHIDqsvv67NY2oWECWQvSG/EbVl9eC

Hf57B0wt52AVgf0IxHTArFOY3t71ngJiGnuyj9Fa+QXPh74Ew761imdwKjl3VnIlitl5NAvWEeKBywlx

189Nkd5tKcaffCE5iTYt8U5yzWkCx27hi5ThwSwRDr
pCGny9kIUoqjxC0LvMHhbAmm0KhFDPdgFWb2n3BDjwS47SvFNJyLcGN4mvfdAm4HBsG9JyFBAHHZfDyr

EdMl+CoQK2oCOTvmO6/u/BGplnn7jrT6bdW//HydyKqw0AIm9VqJBLllydcYBBB0b5puszIbXpYkwZls

Bell/G8SJcjH78CU0IBa++UEIPiNM+G6StXe0WqjNvk
ZaTe3D4gvKc3uATqkIPKiOWCKDOsyLEh6ADh73zvvspNWfwPyu1u+dFQGP4PuiZPno20CYqf/Z2Z0X2W

+q0DIpXajgd3VvUJjyph15UQXn+kK5SnCE8lS7FJDD3FHnGrZYP1TV4BBrcBUXTbgunoccdhqJDdlGPr

icCGxJiPfK0TCr74kxYYkyez+EWf0TjW2PZvlhOvtW
b7DmMxlqN+pbsje8KXq9cOZ5FM/dstGBRfJVfAdYO7GuoAPQQQTTaZHfXEiVEFTBWo7g0wJfa2YoBsCn

MSron/HTtaPR6JxZkmJgOUvjA8AlWPAaRUMeZXYeD0qLyt+6+QgixWd3H03DBHcRh+R7EDfP4YycZVFm

Vu+Kzj5twSm/uylC/DBUcPwjn5QTfTLUWyUql51mmQ
fKnanC5F8dNvzhXr3jl9UCjBIjfv+kcrsT6pEpbYmL3zcN8x+37halI6s10rdZ1SoNv0brmjqzxLz7P4

MCy0lXLQPDzUjmWhY9UJ1Qr0nT4BBEBjRng1sVT+gAy1uWrEfDIUA9KRsxgnZoCIbc2qHL5te0IQvMqE

fjVkLUcrDpR7WX7mdgjtVACPUtXtL5knNvy95CnyjP
/FvEx5bXV8LqsE/n638wjgtYYnxaFImuZl0ogtzFboqx8r4+YSD9zifu/g+N7Ilty73j4kk5slxwlnQK

ycy9Te4nivHs4/KUZTLLfNp9kupa37k3LG58/p8QH1Ou3/B7HU7SBwxG9d8AbyGyV8y+5hNWYNAIO4St

RAys7JsiJwrzyH9iFV55djUd1sIPSwxynpi+0Jjk8v
VyjfNROjt4hoyqTv4oMYtMSmeH0dw56Vng+x+VHms+S5aRMNI8buprpPblPwnJVo+lUOWoiRzEaUatae

HmlzhFXhbozABUAjILI2Vzd7Lwr1CquCBiuVLC1Gkm5CM96IiCESMVN/rpPO1lmwV58gIstzC50GOfN0

3zGkFpGlJ2YMn3aIVm4vFG7QyKG7VQSvTnrxaf68u8
fYSU4bNygbCs4MTGWirqsxt9M55nThP4cnnVInp2NhyISZUE0UFClmLLup1JFw7rnmaPBazI46Fb/tlL

/XvX5jkKmwQnVtTxYvdGgFgnZBIh7S9b935bVEseFN++ZWfRo8eistWO1wz0L7tG23wRchbA+B/t90fs

RTx7S/SnQRT9YHqQaVSs2F81OOBdCn5KGs5gWaZxde
3m+flcqgecOZ5FGfHkU+6ODGcRlWiReIS72zAg+7J9amHTpjuajlPigrcstKll4Ay65CLuL0fksAWasS

l1EenB9RLk/PFNNx9c9MrNy9CtnefR05ymmeYHv+8mxeRz/elhHBh6h3n9/n3SBn7XB+AvEhBspeFQ55

nIr2wwSiucrh8sM1znwWY5AZ495hlDTSjRPDTCnYp8
gukOVbZ+OmCysAAqUZ+bdpu0LN30YX4gtbxHE5q6C4pZT4iU1K8pfH1H/19QBKa/rsUcg7Agl6J6bhrk

yPFPhVD4cb97WWuZ1BAneUrkLlthRkRDiTSO1xx9pxSLaRBCmM4M0dd/yYSLj4Bb/GfQDx4p9ZHHcjbs

ViqkeOww9UDXQchvvkwzaIBV6dHWl8pq2nd70ADuqx
DvvdQ1kXBdIAJBVbpAKqWvS3xyPm/RnTNan95S8ud32PkriHCJ8FOrYERE7bN4sSbqu7R3NA+4qMhJF8

mT3et8iDJ2oQ1x42tgJ0dCHQWiTbf5gQVvvJ9mH1vqSmJlRdw1pUH+tqhzYIlLhPq7R4OGdhMTxu6vCm

q7VEBf8CmtGy4B+bvnkt+L8rRgpxEqn+ilMLcAA5/I
yj0ggwaOZCPpDsmRWX4H+8Js59qumgKpBSOvzHI3+5TRUb5Fgj0AffEqOqoezySic+HB77pRTbCIoCh3

2f7KPT2rmf/d5g8G51PhySqdgrDBH68gjceieu4Xphe4Bac1M5cy4+B/QVK6sLLVOagmESkQKJAS8oBT

Djl3HqlILOvlelik3SYuWtq1oh84Is84b1o/WUb6x/
l1J8JfcBYUMFh7f33NviWrORoCUwaG0mgRy2eqNee4PeuronEMD5S3ikvyzSSkM5JtJMDMegbyQOMVcZ

EVJjlHYa2+mEdmjwA1iAluhwd1QhiGrUVAZaDpjin+4x1OswdlIyaGU1xIbsS6sPZvdzzHB3pqHcwCdo

8uSShRHlPXL7DszCnr3iAn3GwTaHxePBG1veindCBR
11ryMgV/1tZy50WQwPTQH+1l0MCV3D6pIPmvCF00MW0iyiq4FSH+9iP+lOhaVcA7nB2N075KeDu+Pf1Z

0UX7MBiG2NLN9koGboQtoju0AdIm8STYTZqE/OMLCuSOgqwwkdsDTgWnJs1w2aWADTuPs05H8OaDCLil

APUSxd6zhvDlG+yx9Rq/2lMQPMdMZWmXIwOq1P4R7q
i3Jt1nl1B9z8oZ4bw4cgzXHProt36af1BlQgX0YEJwKJDMtJ5dAx7G3hrPd2ajsePuPNU0ee+eANEPTw

TDRNsWuBO25DfRvjxmj8mkRFH0qxIDem534/eXG2P6LTvSQEURBxpfB3buognVWN8U82VtvdX7DtrJfi

tunqqC6CKmWUr21dzWOAU8Mua9w5IN69wibFVUZ3LI
n0+CMZJQVc6OQnQV4wEdWEp2eG4JHuueRZUjJOqpJBpFN9f1CUWJC3qEQcFjYFjl90SqkfnvJZStOyBT

m6FNNaAfeqt3/urb/LCTQfS7679XcVfF9x3EHQeYGSKGvwLotlLMDGW8AWWRsLC3exv9RWv51OZ71IZx

amWXbiTmUzPehlWPmbI+Z5Esa8K1QNAtO2/b+kX3S1
z6jtr72vrEoAfs+x/scdCFosqe8/NzhUY/7410Wl0v+tn31VXlp5uhOrXXKmC4VPtU9M1RJPDF+GjqUo

rctb/JxhfptSwFaT4CvTyziX6XJ1/hu7a9VdPcrGV3m9+itcBhMpLNJlJOl9FoOqBzjsDt7fxF7Wt5ix

ijfTJjeB4iGWznoa8arCgSW/JogjFba34ixJllXOcY
CPBE6ILb7I1/E3b/Unrf/v3mWALAijjvhSlW5lAPYEZkXtRzVITBpN4CTUbghLg0K/buowKj7p/z+Jtv

B4d3OZ8Hxb8AL7RhzofpvJ7FaHFWfHUuQl+0H/AsejUCxOCMohL6sTvMpaKg4OG7Yp9TUEdtdBnx7Rpo

p4AudXVKZlVsqRW9KQXUL4Rmy6uWgS63t/FRPP62Nb
g0xMp1MfeLqEb/juhOI3NWs+Xt3O4q8B8U5Hhi8hDvcy8dXz0b3CQxXDMRIpn8d6yteEZ3Y4eJ4QdcAJ

enmcK6u+9YiJDsHQ7sWUYp8dIp2gNacHeqgK9rV3kNdTAuqnb0JBhfWQqVovNgppTaQoWGSRAUHzB0Ps

UtCaGcSxBCr+/jHafYMDHCdCm7KXEkcscHisJW30Qv
+adxIpb5YBZm+3nOzukBixlitHIziI5m3Qq8zuWamy9KC9YmSWxH3YCA/PL3h9tojSRKwONTg5VkPXws

yuPr+t8q951mG5oV1ZKOfVmcuAU26XPgnmAcOwXlAjG5SOWg+yBJKCeBKT8jwlyBMhkWm7M6lENuV9H+

/flW94XQt6swhY806kk5WPyf/bYdfny4iAUgo0578c
2dCRQtES8yWQpq8yk02UG7jqCbsmBr5P50acsZmDTFna8WmCgx9pmv68cER87q+7NOd+hEAfl1xMdPtu

oUW6kAScZucrloWMcKE24ml2/hf3ooDqmLmSFIob8BkaPdg5Sy7LCpZJ2m5Nheg7SuriJ/pMi3/o8qka

4vYuu2BsNBxEUgWKTpGee25HeW/lbOWzReuP+2QmTs
UxsbFhAZ6hJBQwH5DJucSAzpbCuWwGS45g5QiAZlUIfGUY/Xr1Z21Gd8qR58fFewfVQGuLuMe/Z1u0nj

Xof1W321iHtXM6k87kosenIkeV0KknBNhvW7d6Ds8up2CItEmjVL+BVL7rlp8pQVzgK9aiuEiR6TQH3w

Jn7XSfo6bnfiZmIRN4ieztHui4ZBaJbtwS0Febtb8l
+m8sy3VoSYzIz+JaUNH+gQjuBjXcndo9/gsZe5hAa1vlW1Mcs+U9evltFnDnwcyXIJvv0PUJ39IJ8Z+5

tHmunET5t3Zr0iT2Su2uom5wGbXt5jpp5yqnv+YzZ49Tns7LsOP/Qn7yCM0NHavCcEyZ+b5AhjCYBpvi

FDeHPSf/FWAGuciF2oEOxTJCm4w01CC+vhL7HFSBUb
CUJhFWCn4+/cn3LCrneLb5EhpZy8pcEG3FhuYerEe0+AfIEAKjLjEGe/kV3WDGRaZsjL+1IH7nP2WskB

UqobXQg73U084dmquI8jsxHJ/d99peeffGvJupi1+1uTJk9xXJeUb1eFftICaQPUPTnhC9xz26hEQeaR

MBHK5LFWFkyDRZ9jKPDQrwD0l/KbitQEblIF9Fo17h
MS5/3jvIX3qb26s81YoqC9MUWL3NJPBqIkPPCe93LwB4LBbquuXvJ1fLV58A7aJ1MKKsYFP55YizffQE

gncNOl/PCOIxxlwBF/2TX+G9GnAEnE46aaO2sglKzh2W8h/gaPVptj7FtTJCqR2d8vcnDVv8xklAz/hM

mOTcFIV1m/kxR33iXi9z5agpqFpUZZd96qkUR9Xcm/
f4rDou1flQnC2oPWre515EZLBy8hXwtM0IB8NIqJlFWssyg/Xasowvw6qb5BwQKoUrGZo3lA2OkMAxA/

irAb+u3q5O5QfrrnH72nVYYLipFAbRtkxzPGQotuNPpHbigYqR96I1T13GJcYefZ3tmah/Sov1PqlE/x

4oFs39zVaZvgCoovsIpZzb0so+rZ0fpDQWUVBe9rH7
T4hr8QdBaEcp6s9cqzqFvmy9p5PDrFYZ26CSKNk0Xe7MOc9NE+XyUwV9QDWaMoARFg+bx6kw1sFrB4ux

FfyK/2fVLbTU5iwFkl9zDAokpYM0JViOLzE2JNjVesk6LEjAm8XA40xCkB76jR9ce2XCfI335BsRTCP5

6NoESk2llie5XbHaTfKNsZZtJQZRFuoApLQzzEioW2
DIhW0MWgihh+Qh+BlP9+ShPyB9XJClW3Bxkzeu3InLFBg+5Cdcz3QoMTWtn+2YmZj5i6BtQYppHq4Fjn

EaW98BgtoBoXCcmwUqp+RPcnn/9ZwBpGb1sUts1TOTTvxUCQU5qlA+ACQsLNXx5Ou0u7SYAjOTomAX6T

rdh08Cz9xP8wjf//nSZa/yqwr/z/sR3yR1uUMaKSuh
FvpV/EjwbvIYGD3VFQL86rsdVllTCtiW6aTB+qR16I3AeFGtC44caSBw9rnKWLNrw9AEzfV6WLh2nI96

n3VUCfgZWMAYRUsJYsBYLvy8pLpgCGhCTpDSdqevwnViGuaGMiSIwYPQNPLibrPyM4c8d2iMoHJ6j/TK

Pz7CLw8uSreoxpKpWEiXfOdLl43Cai1aX7oU0Vp3fv
3vX4RALiH3zzT7bSyzxj1PF959h7Ke8uPU1uNI2JIuaA4S9yU6TRbREdfi9A14LghzkIQkji2w4HQ9P4

es4OnnZgUG2h86JjGlbfMqd7cksqsiGlCtngZVEFx8NvnGYqcrdvTFa2NtosPpighOID+zJ/Aa+ceXSc

Dzv9bELzWgIu4w7mLxYc/u7IBBjdysmNIybP+HN6cG
1tABLedVRuayx8Odlln/x789G7h4nb6D+CeciEw/+aa3RqhLNa/EfMsfzYgEz5O0ndV/dsJPzUZPs5kz

cBGBcTSl6vTJ2DLRSWnvsrCzMzYoxrxGZOSmjfCpxRnHOkoL5w3EH2D55yiTSwhnjWsgQPC3SI/zvMUx

kG9OMtP/+pvWLVxi4NUi+/l7AOcHOFn03AV/p5sZ6/
0Rb9r5yZddrQJceLyE0m/msh600KFGR3N+xk7+cGgdr1C3F3Zsfg1+vihaNlAIPWCAUiyRwXSXDAKccn

6BolPGWLRSLqF7p6JSTr8cwPmHNar4Xgh7HL5uEfAqdzkMd3p8wWsOKjR1guqldQsF9e0NyHqqGD1YsU

M3Bd74zBJFtoqJX8B4/CMTBz13PHXchVH+bX/jjjB9
Pe8pPxhxSLy3pbfiBYw00PgDaE3q1ZmeL2hTilyfG1FJ5sll9YNi/UQGU+Dooqh1mdZ621cmMY5qT+2P

QyPoVHZjaZFTPuam26+oK4xE40EbyvG0l5k3+x5WUL20eTlyMb0dFXoIWfj7qAEdZ7rkYbh00OVN6ajE

dtHVf9eLL7pbhaDB5/IFNDZTavug3TVKzpVJotpewB
xszEsXvF0NW5oyAZEWV5oFiO29++pms+w+E5TJngTO5zWnnfPzhjFTaksuXbrZNmeY0RdfWl+OLvusax

3nhrRJjQaTJj/JWXLHEkhpg1qHyDO228bpseL+0X+zEVPWBgNerZoT3VJT3QXGpo2RulUkMnrc5zoVmI

9ozo/pBfyXkYtgGtSPPfB2qRIjvWnr5iUpY0tpAqS/
lK/tzstaiAMAEs/9O5GPe5JcCfcEzD5puaYaMHoJk+9zAHevtPQYy4cqUq74PECgXZ1XrQ27it2LU9cs

hwoF7iewFHgJjg/wtp3Z6RXsRBdD8ecT8ThDqRoUdWZ5BKLiOFX/9pScmNqTCJsLQop/QYUX0BRW+L0U

00wDIrUg2VNAOVqnavx7fIX2v6OzhYalMqVCDIXXGo
PkBm40ZClEqWDW2btHmcjb1oG8s9zET6IVg5pKwSH3PkbclwIyfhSaRjlG+kyECNoXEtbFufjKyPzO7I

HlqjPM3M23FZQnmryux+DDLK34QefJZT/JwmoYQPmSUrJq2ez7BlYho9QA8jK3nglAYdblKSFgHiTj0J

QBZ9FIecAuvf3ogBm3UBmDrixlIzGl/FQ7QnueYc24
J+UrTk7So1kZbYSVy6Y2qPohOvKwrE0ER4W7vOLF3nUETfdXKnxb98+CIeGYR9BH7mfCjuOOIbUmUBpg

vOag3nZq9ewfaxU0SzHPV6odZvDwcz5m1aCy0q/D1TpjklRMM3GHY5ZuO8/6giXrCmv1F1WdSadWB50n

NBN06RESHIAK2iKMC5TZIR11s4LV3H0vOaQg8Wn9xp
3O8xU84W1FjiU/ks+D67ju9Y6MG2pVi8HUr7MktpFAfi9OYfcz1bjfuAzqcxBq/oWHa4g8GNk+x/x6gc

bT2NueXZPf7pX/9ht8FHGniNbdBkZ6N558DY6I85Rw9HjY3i5k8f8fQPkPXbxzLZgfIG3WEbFsrexKdi

aOoQU7BJAFjNHSQnJNM0mVUyWBBaL/GkoJACv0WC9V
a4nhi89/PsqkMnh9PfGRSVZvHYDotO4a4vzLKrHWqcMxn+bmau5MZFG1zsf/tEalqS5rsmrdJUoeg3t3

mS5omd3d9zw5AYND+e1aOucbtXgIZBLRUFbbEbum8xx5lIA16vd6JCNFMDIHqv4eoQ9CoEnxZmPXAkPG

kxpitoP37agkABavXJ/oBL4Av10q+kjr90p5d2hSmt
X5heEOkuwvwNIV1G1VphFjTg+FW6kBVqcWtnQdzoA7Z/xyb6g/+wO2i+N74pS8eX1IT2dI1s9GT1llYY

yVEv28vnJD5UWztN01eLKfFuFxYLToZe6HgAUoa9MVm7YWnX4AqFJk7ZMIBMNtoem+Xhvt7GMDe6FoHu

RBhvYAyQN0nSkEPcCJnNgyZs4n2JTiSey6O+Cv30jP
kZfWIdyJSaq5Tyr8Y9x409H5rPT60+wKWw2XkWC+JA+JabA9N0flcHUOQb8RH2fyiWcvPdmrr07T9Uf3

WZijEUkAN93A3t76F6T2+tNPyZdVtkta/2BzVYAm3++1cpK1rbUkfRJTf/N8SOUysY77rnCadznaKZV3

BGyIQ5C68oSUPteDawWg367hLl8UuZ23Dqweh9/rrK
PImk2a8PdCOwM2hClwpvtMSDIoPDEVTLqyRva7cnzy9ocVT5tXhYRgeAYu98dEOrzU52UzUJJekt2efW

x+nL2lA0/joEo3KheM9/7J9/of5lsJpTqqNsV0PYxKcY94cEauSf6q9XPNztG/u+/fCnnO12SdPXQ5FG

ApaAqkWvRoR/VwBfQR6MVm7scG+n/9zqnbfwyavqtW
KNIP24g1d+GBVe5ih8eaT+ioaN72kLA3G3K9BQB41dz+7jnEKhp5vlpyHkB0o7s7eBnUsldzCZ1oZZZD

dc55/vVtXuJxOcBru2rRyZALZlac0JrTxXdAaO17+qslODp7Lppo2RuaAXdP3rWxioQVKNr7sE969+cZ

43D09kIIaUq1ocud8ugxPf+gI3hqDS6A9DEeNYbtxp
Mp2U/I2GBGA9T4COmmoYcA/oHtWv/kv1AdGD4V6WdyA2WB2miZpRAPoFwFC2rkjAv6pvJnj+xPcD3b2f

nClDG9fyYze2F7fb0/lgnMWkRuF3eojVCrNeDCEWbYDfO15rmcIiwR8+1vhO49PMe2FNt7umlo7Q2IeS

kWGSxnDoa8Ru9/i/eozdzVWkf8vbwxOGGxKYJzDPyJ
uhEpaUUHFtY4T4jDyzAmCQcZJ0RKsKAKFqUfHZUAszqDeEsAVbkEuouOMBFnThCYpX1Ou6rZgok9X2as

tDCDonkfXiPplSfDz6ujGdhvq2Qrn5ajfG3LR/r8qBGQ2lslZcHawIw8YtziY0AHo34nENNpG04ASvk3

CLtfXQNTnaxynP9S8pKkEwgxe30rO9uB2e5aZ5uXm+
1yEOE1tx4fTbfvZA0ffIFlN9p0wdfRBh5dIfmG+wO3f2wtStDYu7a7pezTQ9tROGLaC+QV9iOE7S6GYO

BeGd/8Twdm5M3ea/YwcV/VvYZe0l7Pq0gE671ARrBdGMk22u97qYC6Nh7GiS5PJNi8lyETfDEDYrtYu4

H3JVATq4ZHon5HwG02aK3R+nAC8T68TS8sa0rCtRyx
8lsDgTQZhNVUddCLOr0VXYw3QSodEZ/njPgCV848o+GtYyXMcAJi7RWImcjPtOjLG293TPtvRTXRILy9

QwT6ysxWHPuLdqcP8K/UuynDsAQvVNVj9QxxLSM/a+8S8F6e8c7eymUjTdza5roGEn2G+fD0tbaTiuzt

fCsB4Xen57PqqRe0HrcV/lVN27I1fgeRf2WYi94NXE
+Fr4USSgEwf4CHC/Jdl8DfI5ODu7UL7+ommflNt20KEx2nwv9IlvXo9jtuklKItcjtza1C8jIxiBAwnm

Kjba+pJ3pYl1gzG2lsAMQbCDXQ3uZk39LpOoUA/Ur3dY9M/1tu1u/HmFXYLbboBTExjZ24fxxmRD7tPs

uyfvc8t1dUzMs7Wz5ctho1UfkuVgUf8wm9eRcHAk5B
vqQ5Af+gy8ufnOKOayuMUVkwlrrsO5k81P4VbuaC6Sa4F42vnHFlWRz5rpy4jq/KgRNSD3jBY2zEJQn5

19jOxAGJG8+fGkQoJybKcFc4cijvYW2A9USl2iQMeFK2ZZ9wj7yh6DPZi4nuhEu1VmexIpUZSCuy+Leung

JtZl7jzpFc3TC7UhZp+U4qBLxFOeiOBH3nhCRx4wmC
dhzQEq3OolEvCruG6qHAFXOde03URVE7Hb37Z2s+8rItRDA7NT+6W41Nz34qzIS5Dsff9qXw6GeYBqwg

1Rx0G6zpyKEy8mEge0s2k8fD8wqx95uGoPUPUuSFy4InMgKHKs11396UaRG8UA0ZIrP+eGEYecllPFTY

iThhQ0HNhqyosvv4qSnKt8zAquHIhBpyQflL5zT6Ye
7BNkpQGK9smGkyNV8oUKpdV+uzDSymgE4ygrL4kv7rut9mnJOUNgUFr6I2VBYOMFqqD3OQNCsY2FXYzi

4xls0o3S8jnlYjC//UsNm7oUEOIQljnudFcRJErhdkHGjzcNblNkj2enQypZyfNh9Z7ydA+Mj9ar5XC1

3svJV1scm2mD2rQ5Uy8zraZkshv/M+yt2Gzrehx2Hk
x12LpfkC2XBTT0YwZapmJBm1gEsdkOHKCF54PMjx71KM2MTpsVanC8miGnCNalC0lzuoyGS57hn6AhAV

pggw5oi84nZTor2WCObnO0sSaZz+6Wk9lfddmSbjzXnRexlFJLrRyPljIpUuFy6ZE7jbvDmiEAyr1HDp

5kSUOJa46gSgvH4XW/X+tEvv5xgTqAJb5Ty8P0yRrp
+HrBjR9J7lMNbqI4b/nkYxQ+bIatQ0M03wgnZ/PFDMPK2yjZ31b5iuoJtFxWgtOhFyI0sO/tU9mvN+Va

KE7oA8Bv0CpaAHcpAkJoSsTiXjjmm5QlF9tb8n8Di2FTllAstLVreN9X+06ee4Z6zWCQrTNd0I5KjJSg

8elig7+eyAWGxUgV8CIxupcORkykh+59lOgFR1QGlm
l+yQdrZjOWtQO/ugJSFUYnzN/ktmOXxmfrFEIDjINJTVxv4xMA52u6uJ4FZixB6GFRCyEensncfRKzOi

bXEctVj6i9xV0sxBhZB+jYcbZrsRSf4uxDjDu47llL8u8jfmeoadtWahay/5ZJTPDCmZ/eXiX8BfpFCD

8RRlkh009IaGf/t3iWAauyv5gf3OYXJx2lsp19Izpc
9x2t5qBgb2yMbxivrne5kRLm7zceqb6IjLeCxxr0D71O/432lIHfO6Lj/C045G3bKbvH8nQKLgYQKMeM

+IAWnJw+VbBSi3Kxn39goss19W0Pm1KBq641VG5BFQj/a0PfIJcOctej/ehGYJAvlgffXD67NtJrx334

5ZZ4S57wTNlK4Xq5tJqDfGI4Tj1Y6PA/QoajY6SpET
7v0aC5YHT1eVXyPF4zCrlM+pgcaMRq3Q1SbR8RLvbb0e137zJjIIOz1BJy2i3V12NwrxLD+hg5j9CF05

7Y51svwZ/DsL4A1f08rjua59OmI1+U+ZueQKGgxMTkUav/4S1W/4ihYjF1rwMvHimNNuuZOE4RS6MSSm

SbG29Z2lIEt/bB9Tqm0fq/RVBJQLb+y7BGvTqkHzjJ
2AghP6DlGW1AGyc+31Q8/cK3ePQAO09pmt0wWSprdi9G6GsR3X7mx5gTlrPtvsUa+aBCwkc/qb4kaj4+

XM5S951FtDWMmlYBBinitrel4yWK/DpY+uytavfSnv0qgO9qtGeIma723aYd78R1AearDSYzP85i5oFU

W+d8Dz5ZCPinPxdRk6BTLmpQC26wfyRSDKpqzbFsHt
3xHC9A3zQ8FjmT6g5GV3+Kmig7cj62s6dJnTa/AToOYCUN3OOcQZtxfR82PoYsxuybOZ3SQ3pkZgo+FM

FLZOLgmKh/KgHQ9T4X1vdEUTqQ8ID8RkxSLuRmLRgSTQQetR+LMzc0WlLQfK1Py3P4HZetj9H3s2wsZa

XMfS1tk04MNGHCN3UGZ+ut42aRcUpeN2sbaegVIQxY
2ljh7ZK0ANe9147/GGx9XODL0ZfKoKboQ0n6UIRMLc7bXKfKyiULCMlW2E5d7n3mbomK4+7dJiKu2fGo

ksqJbocXmd8iE8R/ZpJeaY/TLJ83jmY/7ftfj3N6n2DNw5tfaOmh0q3DsqImoemuNg0JdfHGNh8TSXoL

y1CNVE/nTRB5BvGgcvpwDzMpl4rNa5y515z4AhCJq+
bcJWxoj2A49qFEDXJNmGZz4QCI5aI8lWkhutfb1aMoj24VXN8wC+F57m3wvL8Es0Le3tMRptfDPxj7J5

H/qdGRENrUs7UfiOxVTlMj9FUY5JAYKYbD1d5XfgFaBt3wOvI35E7eSKtbvtuOiQGnGOhtihSJN0KTf9

HJeBQcDz0RqoZtyTvyVJQWbA+1LfkU/sklaKM+pEzP
8EX3jKGU14nzAWZ5rJfzNrrg1xF4IsXDazAr5rDDCH+i+INnKPT0ndfQl2VThJtO+N2NGrPeydqTYocd

wZaj2T93Fq+n8qm7zRMffwUxKxCfzcp2qS7O7B0kA38AY9s102Cr+wf6LO+GL/6bsTimQRu0RA3aOstd

r2ZzufE/KPN+aXUnpYwIjqgXbyeXWcUg9DEvSx6u7X
H0XRiLUIvPRqGtoGmIWDQjFPcWFaaE0KXJA719jDgpyRGKB/hvwXUGG+q3W/VhhA+Q8ITGFdzKgTCOBB

bNlEdfRjVpThNB6xi0UFTIn97RNdl6yDY9vGJZpsMYFhxmGO0Vn5wxzaq6O3awCLdeQVuL69VHJQgyPq

CufZ6o3bQL3VOl6OKFbehhi7HS9QJ9uCCom6J+34OB
k32pVnWdjXtBVLGDfHiSNk+Tx+NOXCCOyX0yiVSPD432vLKYZ/a+GY0nQJcabDJPX/fXVX85pqTwrsi3

XvMIjY6pO62mBu+9WE8Zc/Gz5wyaZNBDx1hMcQ+e47QRqbdsYWcw+0S/AWeAXS/XHJYHy5uSGzRyRbxx

FS+4RGvScJnQz5N+/yvTHAT0Yo9Dc+CisB6nVwRV8w
o9Z9v1ToQvI13B74PlHrEysFwh1RfrsQ5HQqjWQuHuuNfA1qqssLuKuf0KN6hgmQ71rdu4kXv7TuQdsX

52R5lG+SBhip5jYLPsUVggEoBbfU0RA+BjQ15FYzhYk6aUK51MQsfOqx8Eng+LfMwEzMskweGc2xMnqT

eN74pLZ414ULQr6mbAHWW9JG9eXfA3/wdl0wR+B6SN
aRk5/Ql1T6nQUIdalFcFHOEGjXsYHG2jnBuS6GqMcoQpZ/5Sc6ASOF92BCfgEOyGWbs2MFBAIPrDs2xK

i+wC+znh3R2OhQ8KrVdPQfe/y2rUxpRoX+oPfWktxy5wnLvr0n6qH5Zuu9pY+kxYlcQTcX81EiM8usyc

FB40m+1VrRK4STh/eHQeG/1jLA3fG7wI9jn1pVtF1U
7wLwStucX39/vd1pSuC5ADFaX8FSUaIV5gMrn58SxSfZalO+9D+6mMs+g2jyb7gwR/jDupqjDc8FMwrA

CIjjaFiUKedZoT6e755k9Zl2wwv8gV+VeXejcO6S88Y3xdpzKST84lPX1/coAsMg2dApSOcFVijWU8f4

5M96Jw5EFMasooaoFGfjXrnLW6cnc5aKTDLDFVQ7J8
P6zvsjbZbC/aOL6C8ZmGb/344+ut9jz6bJWiJUfLH36URYzO18FR5Gruv9zT5rTsk2zsFgOY3RUN5wc8

rz+vfvvyTb29HsumFxNic6NR7519FcYqC/ZaX+ZudFfCpAuB7MnNysjJ8W64+ag0U117juZI0wEcrrqZ

xmefqmAAI50zyzeP7Jygj0jGFLvmE+xpOd3WtFDTIw
rD9gUbkvxXq1tBRAac3BwE8NA5c5LeUZEmsGhpOHI62RlRYw+Q6jA2o500oyK/qXBf6DcL5a51yR1kKl

a4X7h+l5noro5nBZb8nVDm55iEgpFdz50FfsRqRS3lpO5ukLtt+uYBXWep1UnSefxvpzRJ77bwD+wPQR

eqolJogydLgh/GANJVgddQLICLznl+Q5b9kcgWbOK8
K5vSjt1b6VGFoCFU9JyN4oS09OvZ2ii3jkU4czVKU0V2jA/smTNGg/DxXF21nUHLDG2BLV5eNKbfkQmb

r20VWBwHlujhS598wioPjKGj6+nBOLYbqVe7G0QbpdoRmns5nHWVrylFszbwDvmVGloy3JE7QgiCfWug

ycqB5k5Du6fdhxqdhzFEjcRqYx5+/TP5ws6FXHbpcJ
4v+iNoV10QJ4tkhuilsy+rXOJza5TEbk1Uc4wvcabdiL3linmfaJysmjVqX4HYxUMHUHjtjGLrqtmqD3

B1lWabGPuTUfivcrL40jYHBLG9amm2vwSwJ44OfOzt13b52kyheXBlYTQ4H+FM0IULw4hL1bd0iUB1b7

+T+35uS24VBEYhypb96mzEG9EBLXoGC9uS1QzVGx9V
LMz+9CGA8L1F2KPGknMRa2IV6ZugDrTJKGyGpfwQMijvVmyhliWYMbovBOZKW6P4L+JYfzoNpaAP4ga6

HlCrLyfeU57io8YTRLValxAP5O2cBxANrTYfl9v1eSW3aza//H5p1kSksFMUM1mc8flIZHdRm4dVcgWH

M79MH/pNXPk7Nh0FDwbhKI+XgcKYii2WdVBzVTwe4v
E1B8S8Yb1DY0xNqn2XggJ2Ni8QGL3A9Lt2bEvWjFgNnBQBXB9c0eh3/Bnnjce8rybDXixBO6XdE07e55

r4sKL9qJexioM+9P6/Ah9uDmCX+T+ic7VJJpNONZ/Pdn+7s03XLOn4xi7Zc2k4/74JTOP4/Ns090hsPi

aP510EK+qxDYLcGGFmsLe9LNLMkkQDbW07K/wM/7Hx
6Nm+lNdUHRZCEnU8W8+LLfS4ciwivWfU/dtWAjjKM0Z3Fhz7Sz90TcjUkv5v4l/puK6Te6cMPl753uUO

U26CUdmk8VN3UAXxz//JFdUfdB9Vz9OUBUgHPgMsTBpkXznfu6qvDUhTJ4ohCEN1r90KZlfCO8W74X7m

EC4eqR+VeljjWIJPx4Ver0TvK4EvpkRyf5Q+k0JUA8
3YhG33bJZtreCLmeNSH1WLSlaxGTabz3wSo6aLHYlU131yGSA1h4nz7flZp82UL5u3yS4W9KXyrMk5qM

Rbtj251N3bO5rx4tU346p35+pc9VPd5vS10HeHTwe+PsxvstrCoaJim5aUcHGpShtn9weKG7OM8j7M0c

I8/U0RpsruawrC2iP21aPzeO6n09dZZvi8MCiM34u0
9ZdVS/UM6+OGllBPph9KUBMgrLM7MlNPoJmRH2eu7zZn8Frbw601SWYTOKYqqAm74Kl31a0oxluPHbDW

uIRjFTaqZVg31a1bkRWz+BTgxgsgPRDV/a21w50/KUeG//r/sz/n/1+zEFsPvNPddmWhh8yTwEIB/CJm

hPynDu2J+vzl+qiXSK2lRSEMZgoiWvbw2j5kf+nCMM
c8a3/97OmDJ8TvrjaCVGQQf3MrAwIUq93+0Pc6N8v8bQAKYKJaGoccEP9l6kum2l2CCV5apDNQZeSayf

t8Ygh1EIg+RqKuTRzNaczu1tDTwY//fZDvu9W+f6jnnrd1Npixx/nsKwUUA7TeIFwGYNuRXDtuJX36rw

Fiy2kfwiU09Ryy6tiHocRmj8XecQOa1oh5PDL8Pkav
y9u6lv5aCN/u5UDT0N2d/+Kw0/FVjHFXu7Pq53wqJmBtue5cx+tl9H6R/P1XTCsVZYvRknD2pZrIaTAn

TGpNO0T16wVcZRfSX22bkd2UVEqL6KHK8AKuCrWx15LOnPXpaZsHGEDLUnArw73Lo1Hyn2dg00aHqOv5

MwQmqCJfejFOugjJPNCL0jGp5Ebsl1rqrjf8SfY/LN
p3o/aYmWSXQc9KOzQx4zyrk/68VwCcNSVwAU40p9Irux/u+IU+poxMtfU9zkd6RSMUHp6Bl2ER9qis2v

XvDRK1oc7zw/60w4EJE63WJJgKX/lBmuk98rXNtCTTBxbXHTGwZ9mHPuvh/3LG0LDvly+Grm3mh3+1eF

Y8tJm4nNiqtia6f9zzy0W7oWaN8Kcwyf4qkNZc+Jn2
30db4eqFCAwDD/9AW+RMM80kt3nXN79kjj5E8D6h1sD9hBVABFQzYVP38wsTXZ7LrlZwC3rrc6sx3s7S

naYSP6HSGSJzFBRxTMDnjSyBrnYALtroTBC/LV9WIJGBnL+q6s6xxHXs+Ak9BS2C2lQ0bWB38OsBTYIm

pHMyb0kggiIeMgM2YelL5Eh7fw5nhF85EBiyyuvXE9
TmPcxiC4IQvGb9Ekm3VR5Khj6DMEKJsy7vkK2b17/Hebg6m6VlzHpwCACfZ2nKG4cu9wcuaG1ELU4T8z

h2SClZ9jJFmf/CuQAv3PYHZnxYmJ6iSlU/5B2Q65RSEwQnFAbBRUDTuV7mwg/kA1gNdgADaVxRHB2VNA

TXWpa6TyVJXqz8JnTg4YV9d0B90DSfwPJO2P1diLbh
tPiHr+3Mi8GrsGCXr1gg3NOYBGYFJfLoaq0gzaEmzvYJs3o+nn3++nnwAQMTo82Oz+QoLWzEzrAEw0Ub

ErCzd18EGyJCTPYjWrm/vSiEPC5+b//G5NrT8J/MAwkaR1m4gvuZ9UutG08658XD1xaG00X9IN8aOSR0

pbNrZ6Or6zG/ocqLYUAxQHuEGdcn3qlHq1qxJft21B
iCWkAW3yFpxft+P5T7TdEpnqxrWpT0gCmIAamVUr3Ce1X0THndi3jDzgZjQaSkL5ud5O3U5htUKy+iv/

DxeT+BXKxukIPBeOyn4WjzBsLquAtbfZvJq5l32Ax8/72UIoUN0XwxnzQvJAPmIVyqG0GgOkgRVcFLd6

x6kOEfoQKGznRkw2T3YfUglnDqDNVCZ2HkjC0a62Uk
CWAFmcHdIejiCoT+1VmMoC4eXaj8NqhB+Y/qbBAGO25QC0W+rI/BqgtQmhAoaOhywvrAYR1WYGHE4YRj

HCHSry6ewXRmrpAwcMoEPirquEPb1Z+8Fv1jnysvnbTz0gQdXkRhJEzcZgK6oQLfZ7HhUo20xe2G/RtR

brQZQbXspyIbUKq/s4R3dliopp72maKn37pg40hHEc
sDFJci3g8OWBpLZdhMr8xksrVz1/Z99l75vnb1ZYxvlqBhzCKG4pHGOp5M9vPkTQZPRX7o/C+ca4Q3iT

8floR3enOC6KQfsBjUtVzmOmFGvNIbDxt8W8JLMZs8O1bX4shrXcM7uwlLiz85HvniUA2YoE2Je+9eyD

vns12ZWufpNCD0/0h3eqlifcwebrQNEwsA7wPWQIqc
JbDCQYAtMEUtPFDQoxEQC5ZCtdpF6N23vMTTM2KwDRVPlBH4+b/nB8mM6sXrfhd1jq/bjMLjsw1nu+3Z

a9+if+VtZwS3vUKTED2sC7p9B8z7XrO4LtzmIcB93ehAEcINGT3pKHPHF7kPOTuIz4KyRmuKYXsyLzFn

XIVfTYks9LduK/T6TlztpM6hGnE3PZHPLMWNiwcQ/e
Dd5nJ2pEXlh4t0gsrkm57uWDFrWqS/wIgmA4zPB2mEGCxVduK+f2nA8gmfTaaceGa5uKC53AkHIxP0bE

aSskVK7dwe3qbvB9FAE4fDZPpJi6xlzT+0aEIESMXiKDL/vTXXAwEiUEG2cSrtHu6ECr073ha6mmJiia

BwuSK4yfeXe8wG4v1/bnEtfGknx49AXgp6/RMgz6Xl
PuB5fv26zZCN8oOyo6Klhg5UOywU9bA66C52XHVg5wvxU+n7oUc0+/eXHTK5JruVV7s86N1OmzkZWhCf

iUjY+1bIrnDCHF+1rmYnfd/91tImP7czHivJirVhHme384WGV8RcoQSQFhi+upN09LoqTWue57dd5sCT

UCdf7Jdjm5qWx0ZBcjDz3fZZ3hfateJUv9yvaxRFrv
sqCoHfTOGj+xYLeQMzb8ab6GCvO5VUPBYELr46rO9XhslOOCYdfhq4VoBHhBFQNf3BGVLlwvDH5XvOeY

t5zUGOhKFSfZnxR68tSaVYcrw4nkGMkkU+0Ly80yhEtYsHEOTSY1n/5JmWY3eB7DqR2oItnpXQXNnVKs

gbFGsUqC/rr+vb7aUwjJrKd71csASGH0ta81Z/jNxb
2jhfPznNj/H4kf07uycabSE2AZSKWkv+anuz6oIHy+uLikuiX/KMFa/EkIUkejOCRfISZsQItqXXaGls

Z+k0eJIU+X5X7Xra0+KnbByjBr/5cYDaV5D1ZO5zsO/c2UvyE/GBcDzlUznUeVTTf5gUUVl0ZYob3h4A

zVwC0Q0+wBF/If/Me1Hft9I81yPMif+OrINlxDHFOj
jV66EsBejjPM3tlrfv0Jtoanpy5/mSpeLX3ma3cfyjX2xnBC5sCXsAKvcKne+gG47qg4n0Ou1WxWHggP

nDTe7yL3ynESnMSacSF35ehzQNH0m0G7r38N2zJREpI8UnTJDvu9jsG0Azk6s9mIVfmhlHhriw1qgFH4

xUlxg/SBSMvnHfMTUJxbSXe2SOAQOoEUmH4cgxCTYP
FRrnm7v/xj4iqcxDew5jqXt5rMYZTxrjqcNEpxavY9n98aUY0BZYDBCrG/hIxWVnvriwn9FkxjkNftnz

JjkXlrfw2isGaaeh5mAb5/rNzC1ETEXPYL/doDJuX5a7hQnHhc7ZFRGwDWPCVzhMGtPnfXT41Ck1ozxD

GpRKtBBAWZRpqEJmvHE7Zsej9fPykd3WudsECIiVv+
QhhOLbvpDqxwmctYIvgFotuZngqCdSITD3DDl+aIRRn9fr395kQiH1YDjJCjhwmevmHk8wY/3aod505J

0WoFNZAWLEerf5CI9ADJZ6Gq53an1rGC2NWEdp/Cg7WdyI/bTbeCbR5INiyj2QToWvH0gSCXfmQ1B0U3

WyPSN8MoLcBARcK1UA/QfbGzP8I2voZIGdw1VsDvHj
W7bhG6sLB7T6N3Dj9bKGhv+wkItFVNeSWD2HaAZu37bmcfF4EaFIx3gWNEzFcqgaxmkhBRIT5m7WnT97

ZR09F/rUx8ONYOgQpsRnZmomkte7ob+gfAidSCUmIdLqzyyVLkIB3iNdThGE4nePGiN+Y4PzhmT7PxLv

vJ4fjhM8Nadz09JyoXlppH5fq9D77jvF26LFOMlNzK
iyp6VRyjR3Kzp6XUnj2jf01D2QxeOkG2TY1T/Bp/QaCUjtuY2IvSfDSOnxLOkhiXB9WSr7qmLnElN3Hh

xUiA2H8s7rBaTNK596Y29bvsy6ELKjF4lI52LoY6DtY3xl++QrKCHbOTjhhruD8OCt+PFG9L1ZojAX61

zWxXhXSTR6152EFpKjCwxUYoDACCxJHbnalwVnx+wr
YSBOahI1bl2CmnjA26o9ttcWArlFh7AVRF5MBZqi6O88k4T510wp6eVjzQfZneXAe8LualUFAcMQzHY4

38JjJyR6vgbuRK7f30ahNxNKtCZOZoqQvp/xg2GPgIfx6O12uF+l9z8LcLC8El2Bf88WAYeUQmoqfiKh

Aa5khe9YtbzmaAOYRxkrJCXJ5XoX0SmYDvHpXe8/eh
pz6TiCBq1m6rDbLCs/Itgzu5F7DA7Myb1x7fdAnZlyuO2t4fqhtl4dZ5EXSzFpZ8/GGZWDFOQTyOqNTc

ecMT7nOROptFVe3Ykv8ToScErJ+iUXpxfIoMk3SOZ4vZ/yERl3jnfdgUzHaIS5vyqD2NrEcpbDd8l1ys

sUqvxDZ5SRCqaF63x88hAu4e7DLnmMeaQ8XcHKNAyG
d7DZGrhXRAYfLmMbt6N+BB20JQdlpYtNAQv2lAp5Z4iYIuxFYAHchDEAa7U76EuoTPxzrvn6y1YkVy0a

iHNmtoffJJNE9oOHsPQGZzQ8i3kQvhEVdFsUGJQxe8ibilZMgvvlA89f625St93JZCwNZ+dshPyAf5+p

EtQfN7sokinA1YQs5wFBFGXUCaYkmb8h0i9W1Sjo8V
017W7KhclcZuwkexh3XOeMfy5d3yNj699M1xBu69ZeVhh1Tp7o2qX5LZCU/vtSitpoWQPOZyWEAC+lYW

bNwLZeMlaIRO1B06T8eCYVYHFdyLg9JQgoL0gQIKjXN3K9EOLEx3st6NibzB94X/xThhOpYn50yACUk9

FYxZmSY6oyFf5N3dmeUIlR1E8XmUGDLq/Oi/WxXFIs
tLOXekEKOHYGw725ftAY+QdzNGsEwL/86wTu1lx49ej4Dfx7tkEPSeeSpMtJ6sc5yMtt8w75hrSN3Pdx

S0L2MMqvsLcOquMqdvdwxkM6jnUYYsNQJu/KrCi+x94Lrz0tmfWP1g/nkNcTP09AKAFzi+g45Daw8S8/

buaWjTuSCShvzEkdqmm/3CkMo97VLF9pNVf68fp1LX
Cdi19QTzbnycSEhKfXMbrCl0G/de4Va2HzAu4eKsFCObRAvBaUaW3qniVd2BBGl6IPvKsPAD0WgmeOYT

EXJkoNVMUwf1/gXDSEfz85kek6eRSJE1LJQwECjAjD561KoPXuc6b6GrVhoU10syjr1hW78gfs2Q+sxz

WpNc07MriaS3k2SVVU2dl3gUTv9B3hhkgnFeKjiS0+
26UX81eNBGWEO/I5wWg3SAHmYQrW9/GCQya2nElqKCAYxhi3bDEKMKbdYdUejbdYEyBdFf25s6lHJW8O

p+2oh2jm8ttyMzgw3njed+K25TBPN59fKdM5gL/G+g6gyVMc6YCO5DwzpaTc92k6GYQB34tXYreNUve+

gMtpDnOiMO2lQigj+XrpMnGZ7XARYQLeW6tFFSBH3b
zJ3M1pzfB+qs0ojmqaqbc08f2dXLvBn2nj4IGAt0tyF2NfQFVjYTinrSGBsSeOcLQBZpMEb8LfopoJHr

a8snM4kkFfEoWSJ889gTNIO67TiOz6om1dh6Y/tyKgtX7adu8HD8mLmzHhh6alcDwCEEcJ8ufs5V2/u+

GSVZtKYrWjmApCFlbCT04H+sVFjvjhucxkgvwSqPx+
Z165+Jude/f/NtqGDJnEnOyAZSf916zOfO6X+lVbmuJv3i/XscnRCK4GGDtC7+tHDGslV1A4cHpy2Tsie

ZsKwJFqXayRHL9tfwxAsZMsrvVfR0raIGGK5C2j4rLnYXFp9JmI5mvqbCAmqb2WDEDs2SHyyam/3wk9t

9jcofTE9TFwC7gUprYXQfSpioIOrKdgcthLjIKpHNn
qMNH9/E8aV/+pnFMgQ8K80GllpqWlry9NWZy3eO+gWeiu3WZZqTCI9zmlbkbozJooJhe/rvrOJVmSI8P

ZOeWSikNXbDAGXkoTvKRM/2XMD2TJHKzIqJV5xCcY7Y52WrsBerrg3aWtt7PHsPKS/Pq/dszj5vOAi52

DsPbVqVg0LycDxXg50wfllW88UopR99inyyLn7/Xse
n+zHUdqItrt/yNDZQ+XM4Cw/RFXDejkiz7q0e99oL9ZEtll/E0ef5xMkXJAsGMjztQh6OmVWZxMIkG6A

AZY7VAHQn4/sqq+FgeNNnAi64vcn6+f6vIc5vN3NJSmt2uO15UnFTAND82aqrqR7roSRKoEYq59+SPUx

HgMvPlbYsBWafH7zqwDdJS0GaKa9T1bjCj2iO+zZOB
ydStcOLgQ3VgQE86TCS7Op0eeLWr0220iyPBag5dNyUY/XvcFe/GE3d1hxHFCFFshZk7riv7ikDhTf/4

eM90sJGXTeMR1scX5VRyZpCMqQAf9sgMRrwOlRrhvMw/BpV3szWIXC6UtVypAfufAcYMcNn84iw6Wwt5

7mhtzu6hwrJasTbrESQ/o+6uRRRD7fMDhmxx8zZ0Q5
+3iI968PdxNGOj1xetwko4mx8kaS/L/a/oyg9+B4DHYOLM0te0ANwSGEXk9j6C1aZjeqyEzmdMb86+HP

vE1YRKy2q3+BWCzyNlLBQ5Q+4A0xVbmjvXd85SBunBylVs6f9b3GqFIfWEhU3eKBUByWKmp/rETk4D5e

TOebfuvrQABapUHGWCAMePFmf5QUoxpsra3G+iiICu
x2xFdwfsvb3ALTglNcxZ5kdDvljtbyvbEkNRbSGD57VDXkhql8J7JMgN4m7kua4hwsUzsKfZXuwdYI0I

fd1OwOGTsElJ/G1Ngj+0r65aXtKoVpyo76oMkMfcoz36Kv/bibIlx4ElK5OZq5pkybDu+ovIxU+o3oCX

ANbmawGyEuE2CPxzMI1/3fJ2/QhgPGK1l+9/72Q0lf
TvhAX0+RCuaZIDKCi1+tR9owuQ4/i9DT/FaxfRAfi4HNWgkCifFB4kc/MWKNhubA1D/Ud8QeRpBlhBT+

Z/mq/4I71jnFOfVLfki7lYYr/h5hW1BXF/uvIYWpCWJFkf/4cOZ7bc+qfWgyIPh92uU2mIuGnQJvd3ha

YaUs2O17eeYCIZC+gYobomuj7B+jKODpKCW+3gVubK
wzrvXMOr8nkXQ//8rd2E2JriXHbYPf44TL9LSi9GKpGW2l4w121FiQ066sIBvialVfAtt05Lb3Y44zQT

T9w5ikzC39Wzs4wDf2/rKg5kAY7rHsn8UuKPCKotZLMmyL4QVTQFw2/05hrQtPbIGGVekMrVeDMOU2ud

7qpeALXVzO/84b385qzLq/Bp96OJ/oWKac/AhiXlzK
rjXGBu/zA4WUXEqSES/O7D4HLdEDZEGKa/vqtxrU4utXbsOgs8br1uAR34axCCgoeuH23pgxTRWcWrQd

3+PFAjVkAsEge8KqpQVI/9TBJ+uKWXp+zndatJYPziF79LOJnk4Cs1zL9gzJ+1s70cWreBkM2OlPHCM3

smXjYT8GU/7vTnI8R0FQLDdC/dUA8awke9s/1ztxT1
YGAHC5z2TGtqmW94VjMG8/AMqDm+OExuusy28wS+dIaHN6uInfk/1OtvdfAFfP/8vvef/P+BVC+Bcw/h

7zFzA/+d+puCQUX/0Go2tIENeYbVNB6+Q4R0KqBjcIx4aY7+08fDyfpv11ETaQ36/bLvsnUF97GTUECd

vDOqneR91aL0a62PLvbqx0rPgGpZv/oIVsWny3//Yv
HOmqEOTRc8OI5W6HdOq1xM5EMIb8HkNHhsaFRO+Ns9v3MgkF7Tu6Xqb5LW4nMhqbsfdFzwDioUfg2Lg5

r2SZwN+mKNtB+79+X1tLfFr1oDfY/Rnwbx2xDvMkeuyAOcyTn03+fr7zI/uUkbwnJtGVCHqn5JGoiI5/

PHesHuaoQ0okb19aBWEKzWGrhNupkZ/q8UeR4we7RX
eW7FckxDj/1RKAlCh6Zb8ehWTTeG+JAd0eRvPO+ZTQ75UzRSvP49QdOd/MXfK2O/szznlcvpSkfZYd0V

2G6cWkJlat4EYMNorsWJp/gO/SW8RLLJ22Umg1WB3uXkGC4fZl/diY3b8rRuYs9w7p57eLGg7/0ixP2n

W/XvCifytmzBWdFVtMtzrYWNdSktdYWOWJ3YnTMsvq
esxpv5fZdizm/hGszOJBZfLtbHvQ8ALxqKf4fAAP3qLANcr0Ds0Om1ogy/fwhEtJWHX6Xbr4lg0hNVGL

qKynf/IjjKPxpq5lMOymh6unI3QO103XWUcMrkBzhr+9bxvizrbixtBRVq/SBBuuCu+ZkFCf4ccnw7nP

AeLjRjkoxaBRbjBPJ9k3Vq/SdeyS75SnOXZsjZHYAQ
fFEZSbRSttc9H+wQ+n/PjS95TqgDT7hpqZzsxoYIecb0oWfl5WTHIqJFgYsk1xsopN06/aTKxC0XsdEP

m6e5AOD8B/+APF4e1v226sN49i8QtkFnxC+vf1mPfdDfQtSxK9c0/0JjqJYpZIi9nnH8tebLQpmuuqNI

bdDuKKnhzyEVpVYfo1/OpdkNZSR941UIVa13pByj4T
Ck+Y/KPSqaBvsp6hJK72xIyRrxXB6U0SPgh7jwHeNH+ul08u9mBoHiNt/e6e7ww9XOOHrbHZkmWoKO//

GQv0+kiEN5y/CFH9lcRXUsifHOstylS99e19ZdnltnV0VPvEPvaotV8+Cjc//q1v4491RoAocUhUD9c+

j58zKNuTbEqVqvf9BY4rsZs1TlnFDMpBPcViuwjbZk
yeor1zkHI0q2/iMnTEdvEfG6xm6e31b8O7FfG6ZUEx1QfMev8PLaOMEmkeKmSSN2tHqcJhycLKbkqFkz

zpaw9j68MaFwA0F/ryG4GfR4IDkeDhqsEC/3d38tvzlkqK6vrOYJOSaiknW7LMqJKU7Rmm55295vOPsr

/0GjXTdFDq4BccCaGrZfuGKEHQozOC6cVjGJr0mmEc
AH73de3sziJSDH5vkPkBTjm6f9R2irAdMmw793z4mSHbdkF4nBJzALRHtZbHo8aJWLlN8jgcEVV0QBYK

QPUvTOYKog8QjTY4y9GSj6g1YdhBm7+izkD31E3o75Hmm56poN7nz6F724RP7KfNtt5sykFpBG1REZ2q

XlaV/suVM+XDDi7VqYH+imM1t1Y87v8DQxmyaCjqK9
Rb8v3ANFzwWpBi3euFsuQOd7F3azv753IcTkOlqIFHU9eTBIIeejvKRR698nfDm6eI1+nc1d/sl7hi0/

bwVXp7LvmyU5/fLMKWbYFLIr/qwjz2Q0Fox5E4pWnWPX+zBOEAY2pjfwJbj/3rdrMpcBps5RVQiTWWit

ZfgOaU/1CV7xrCifQhdNc8qnuXuYK0Y+T0GqGVrqof
2REcmggaNDXKlcdTfdzwUR+yLl8NWLi0YgSfd+veQbRSEu11w9zE6FMRZ5XpcUD1YmkHQ+JPUeBFG6aX

DYnLxwUZkX+mtY8NShP0MrcHnTWaENetaWFC/1ENe/yY+iT7MdOglY3bHEU716Un2YYOVbd/HayQNvpK

WqDyfOWWTFAs01J7Kg/cv6OwMD33cKLE9Z1Tcl7NjH
ZgnkFxRmOI7BfGmuJoR8A+ceaU8JlRw5CFbfPLRaTjI+/soUhsHcrkp6xIwpHyC9PyxV3qlqGmqdBvsH

MCnCJhSCaEwAbjpZLCmD2rIZepdtid4T9XgHQCHCsKFrQA4wXapoTFfPd7n+TohPus9haKRKFAE99MeC

OCqEukwkwTuYfDQMNOH8e9TgmP1fE90nj8vQUgno7C
2KkhM4tzm1op/7ffRBSZKiHCkFCm9yfG1XjNLcwaOcaMLGaXBgRWTyeXUzSkRu0ddCPdgA+sCkfgXm8Y

dJme50au7sSDCv1i4uITBRVLaXsln9JALtDoWf6MtDHxRNKtNOAD8wQZ4Q2y9bCjkm4QNuS/Ics237VQ

k0kWLVJK+A8xSWsX8GEFDrlEgjoGV9dJEY7DKKWlPe
+1hzC2iQQBz++JHmKY7LCgYkf6pm12+LfL63m75ebfV0qtgQcPicoJY/aTKr4YMY5K8oMInpJsaJjUyr

Xzc8vmnetyKTNOC1mpMgOqkAYsuVQ0DbLa6iIy1/qCOcDkA7l0TV2cCdg/KjAtbMm8A9FJs7so2iFBNb

0ocjMDg4pm2rK/WkEYGVK8u2jLy4L+xqOHfchg/AvK
bt+DoUTPa3iYhY5qOkDj8tjz66XzQRJuqRLuinPPp6rChEWXgKWb7D3qR6h86xfwfsyPOLfVfwxrxczp

9o09WkLWXrH8Eo+QeKnSc8P8NiWRjLZAjwcjOOq6gP7PnvlyH3T5YbNzLai42WeJegIA8AgA+IIrgePL

LmN5j40BzO30DpCjDMMWnK0PIuIgbeCBLabCeqG/Sk
iQ6H4W2+jf5iTrSTHuh8FcUsaqdiKRk0V8zt2/rJ6WLVEsjckjkY0+eemCH5tfSeXK7tiP3y8M78BUf4

wserGRI60m3Lqs7O2eZA1QEhIQnDiLbyY+sT4lZD2wrUjFskgZDzcdprA8HsDCJDxJRvT+hq5l4HTCyk

9n8SWRqJ/U/qz9k12VGhuZzGfp/XCBU+n9A7yI0kbi
hm339R8Cr15KhRRMHnhfe+pw9xo/JeVJcCoGfSDQcVNueQwljS3rwNPBK2pJgVl8eYqiwDiDU6OZSgYV

hR3vgZt9Om0/mAtbcVrv7zPXo5TeWoNBOcDihItLYHEVp4lBoCDxDHIlq9SPssrQN8TiINblGEYtBZy7

Dt9wGHiTtilWwXd/tA8l4kxasRViwEY927MX21IOQB
2aBJUkZ9lXRpdJr0RmhRbSKGRipzU62kwt7K00ia1XbX1VnbsCMRJWF56qZ1g/O7OP5SNW7gmXSoNUqY

h2ytY85aLhBwHhSntWen8Yl18HDF9Mz7KATw9/50WbI3iW2GLKyX800iUK2RBf2WGWclb735DrFCXc1v

XvRWcSm4gTY3VvahMaSeEVhV0Pj2Wy+uDk3Ue4/OGH
+IXNxI1UufmNu292HJV7HyPynDcKAdbzBCDVXBnkrW0pqTcHse8FFXQzo2z9aNQPWJWDum7B/GCr+cmH

B00nW0FXiZGgb2YRixVDPcv87hBRX+IfUwKtj7x4JJr2UZIukzIx5WyfTT0M9We/8h/Rlvc8sLqG4kj+

W1Sh+Ulhh9RNT+WymuZzW1oaR69/4C2tUytblhYeR3
kzy645cD9bqEBusDdvhnegZaa6YJp8XS84y8Ld8TY8GIpYMjqhJ7lxvpgg41+refGyjRvN22Zj1HudRQ

OGtu1uuVWKOF8TKkPtZGSmtTgy77c0bUqt4IWvZf7eW5hRUu9QEPQ8fnSUuF5LVKK+F4LNHK2qpC7X/t

IvXW/k7fSzbbeT0q8VF1TS4wiEAZfrjdqEFauf2i4+
ukvbQ7rVwqhsgmE7kkKc09w3d90udw/fUoLLyCKFauLNS7af6MPM3o+VvB8ld2XboEp0qPyyIj6Pun2J

gvh17xtBRq/rIbIe0xAJVgHmNSlK7tKSvdwdKEC6rUH2qlnFNSIq1JXPwTYI9RTWvMFFrLPpq5EwXeoY

QdoD/3OinZz6SCpipUZ3uf/rPMxoAKVCy41dfLMjyg
S7X6JPP776VUluHuPsXH+3aZBKTuDMCOeMJ9R5e4cLVLvN+96bK2zvJhnTFJefwm4iSPdwgDYjM2fY/w

KELLY+6OfID/tSNzb0MTkwXx3uQIE/YYJy5O6lwsE7gtm0T0b1HoDhQy037amleEnvVCGja38b++wlMX39

8ISytBdhn8GQZ1uvcZl+pgam15/rUAg/ViR2HYUNah
9JJuo8d/GuhNkCEyYIPk2ad2msgeui3apRVNFQkS68XcbJ51rWrhjlQ9iexGMjzxD7LlMW/lRf0H6hf6

sdqZ2x+T04IdYpgag6uR+lB59Xjm4t/Bi02chn16Qv3oVdscbftph4D4aRzJGLvw5nvz3beM7FIjtZD9

MS6ax/0sqzzh0iM0AV0YF94Guz4LIZ/xoaWkqluAlp
qEVM1R9ELM1jceU4EKiYD9VwZHJoPXRRP0pWbbNrIJ8GrInfmENY8vHjAnPaH0NEEMzKa0ufVkB/c7UG

fzzI/mkc7HVJm7TfFerGIgELx5F7T+OjskwuixZKY4CYMTTtbOnITlftOJ1AILQpMGqpTxrQHIutB03N

RPHiec95Kq2V2Kr5ehVPQs+0j1kNZPllQZ1fepkfQZ
PaJ+XNvM8yPyNS/N+LVzzMQc8XimGSqxKx37k3EF9iSJa0vrCaa8b4Yyyc6qal+l/y3lBrL9Q3opBEVX

McEBwTeBVghQQpS2J8y0Pe/EaS762aBL0qc8YH2mvYZ01jdr1baeEj3DB8Mdnq7PTarquZ2KlRmOse5M

+VGKEje9aYLnOGv3fd1Zy5IURcwuHKcMxn0ZQ1edj8
v6+HsOKq/zydGcqxeD0ITECQa1Rd3eux3UUckki3ZjRK8I/KbaOPX6uIUf1ObLtqPFA6OoSqocWgbC/o

y7NNDK/UMK0iMvjL2ravJpXkefQ7luVb5pysWrDHpY/IMNuFsMsaWBNgsR286U2OCxZR6JGQdhLv8me1

C7DGCD14H7LbpEBAGmg7IgE1FcSBNleBLUT9n+CJNV
qZgC8WeGj+OJxw9q2UaNzjXo8/j4cNQAZaghmX8XyugQbfZc27anJTvMwMtZqDVQaraYr60CbIT7VXO0

WXRC2V25a8Ur0GtByNNyZw1YBpz3u5EQlLS7l4f6HHKPEbNOtFMCQstyXx8DNuwtoUAPl7yuR9GIVO1a

pwzCkJsFOzBfgBClMNe131p5kMdILdIg0BjOSEGUtx
k3OFRbR9E+pW+EIfCCpXy7xxVcAQ1ctQKt6YbkUxyLlIgRvCx0fNO8aWeNM1Aw6WySRfKh/OLGQIHIur

DeS8NO6969Nmx63DpabKmUK7rgevyM5PckzgdUaCdHOMtROo5kyaXggGH22dP6ggcE40SBjqJ9avGJs0

9O6VssqaghsP6oXewL50ht04JBTY4s/sv0/NicvwBr
gKdocTQGRTZdSqmVE2LCCQpg+BBvHv/u8lU8aZJ5EOwxHI/zf+1ouveZo+WZaWXKu0m0R6SKzO67UKQK

uKU7j0aMljl6ExOR8bOzWep7UUgSX07s3EzkI3CY8SlY9FqdqZno1/covlCu1SZWZrTHPUU6u4+SxM+J

g1ddbdlOM226LLXrN5TIwtZK5INxxoRTOnw1EvyZF4
S8aF1Cez/eckz5UIV5Bw5Qmr/xum+m6+RTZ+Y51xMUzlmdZOTgJaTyYpU4Un301+gSt13x+SqYBsgQfz

6U9L0XC5AwdODZiX7yi5MUPJV55dYPvDhAjecKBTRHAsfCUtie/9Tqd1UdNfzncgxAXPhgBWJ+DiXFn/

SHUbozIXVDH4AOVlyGBfW86ej2Q+bemL0P0lGs7CuP
OSO96bfq2BYIFA72Uhxja3eQxJUKN8k9jbzsglwPvv+bO1qBk5ynQMMd8qQsSLtbkDVbz/HikC1IylF7

hh3+MEcgdVVco0LgSSTTx7uj5iXYLhcfmPZ+MJF5qxykWVmM8umv5m6N1Y8sgHKNAh0xF+kK1jAtTPGC

Vwt+YSVDpNEhZX0bnoLROvWnZJc0ep6YJhr5nU5TX5
8GwrkbuhLPBV+Efo/IclHd2KWo6xKFcGiLOZ6cKBlteiJ4pSfHFOr/ZdCMeCTEs5G4tR5NFz+RkdNEqP

aOxJDqM3ObNSkh/ne5c7r5AlrOqxioRkyuDjjaIkjTY14xlUGfO+xwqeyAKfV6FEpVOdOO3IM8HAQfaW

I0s5Fk+dNAbbVGph4ibSDF6a5Qfq+g6fHq83iZHaHh
clZgwzljvuNSTvUV1a1X/OkpNFDYVazBqWabqkViLMu/V+mipvEtej/+dHyDSCj5bq+S6BAVhDLDh26o

8j8okRz4cX4FIWrogiz2eDDcKv0JYpkj3qL0VuVnMaOuSruU4UWz77kdjZeogs28lgwBqttjxTpXgE2U

KMLllDAtA+7KyNDbTi61cUG6ycRoIQcE8FyN/s7GKz
ju+PX8o96ZAUnsS6LQRgUvdl/dXwkxDYWyM7DpyR7PjplR0jE5D7C1MSoYCMC65V+LT3KycDBnMH3gU8

XOcdgPE8KyxsqEsCDpdodZn6HuopawVuy85JdgMaG3xE7nkzKm5q5KtdpsOwf05BvJ733O0ariajke6k

TlclY7EkEvvWEeLe7sj7Gnd37wYVYSMr9gywjaxWj4
pyJZwmTsrzcV6X+jBEwjMuTOmDqJz8qisRoMIutUmNJlhd1omsvCb+yqGuzGWHs4a3H9nPtxU4o8L/LQ

lMYoaO5L8i/01BCAJcGafQ5dJnuMDPLzx/fMDrWG5qjxBy2AE0fuxZb2v5IolkcUPff7lbRWl6wZq4H8

I6v9vSgwLlcWugYe+Gq6U4/QPpTJopWMl740f8vt9Y
OYloqbgpzg4pnT8wr6AUaWyaQmtg6by154N9vSRkY+Pr305k8ul/1b1H0BWmf7/+d5oWe5nnVlTajFxQ

q4rDVYZ3tc6oI7biHiUxobsArh8B1n1JmV+lWO2Hcl8qAgDG1SBmIPQglFtF78UcpKPdpx+YJHPZ6yiN

NgM9LWdLph9p+QMrV2v8oznrG87H0k9mRlCCpZ1mB6
615JzA3SqGneG+XNLXhPlHHmv1SIvv/QVSgOKayZmN04HgUr+/9wqIxfWZqpprYE/AL9/hChpeULpdUQ

mRxUAXcOfCGVcBsf0zuZX9qf2GuVtCCjm3bpQ83YzY3X3wGhFf4haoHZnz9T+V+uBhNiw99V0v/VWcSO

Ow7roTIWbZ0/YnvjCdO3feU/NpYvLkLs3YD3eFmm2R
MPE6Dq9NSfB2rO5LMox6P/NTt4WafqBhEtMAXHHoVl6wihe2OZ4vn+g+Ub3y2gGwNofCvaPeFrqq5XT2

Yla5vXUmWaL9ua8wYLfddyXuBFetl5ewgkqaLE/nSlWwDNxaTnGlfpy7xCEq+AbUy0j1zOBC1mrdrj7p

herE2ketxjc2PuZcvm14093297rPWaLkqG1dhQJOg/
U9blDfPTmgCvTq93hSSWkf/5sue7pMw0EWj6YNdpBmKlzHKJhx96HB1PnfCOB5iv8dLvcFYC8qbwDTCt

n7+s449cf2jpva6c9VnQXa5AIqjH6SNlB7EgHiECUtFnTDXMQefVqwJ7wfolez9tYopyjfy7E4HJCEnX

Ly2edorLEnT0mtF0tfBsK+jpPu0HnvGGRYgjgZt8C8
EI71/PjoeJtBPHUKoPhRdIRSQ0GYfBtMCSyD4/TbZXMPj+50BHaymD0bc77cMLK+oHd9XVAKEbryzLwG

kZfmMBv6PU1Nq1iMyVA7z2bvtdSwA49zaSu1VwScDNf9dbj2l9n/OiCmkWsctB/W34DD0gFpiJD88B03

Z2M2k7ndyTODiitVJl3yT+mju2qnlHZJxJ3L7+XPj/
4/TxR6xzkJetEHLyi1fOeINH5E7KqDPFMovZu+7k4se/gEuGzu2/gGh/wm8V9overp7wfrNvChL068M3

AaMNboNabzlf9J6ZB/XY/2TYZR8OoNH+6O9S/nhc6WfJj0Nu5WDKi9sZrs4Trnkfbp5sQCksQIN44Jgd

OYUXMkgIZGT8GnMje5CUsOnb3uK0AixnrD9muKGy8V
lowmWsZWSdB7pPpwTWDdevKr60r7ZcGj9M+u4wUU9SDyBZBO0JfqvSSar2Dh9uztOjQvut0Pjv/dBobG

9BdcRtYx58ZDLWFmG5sf6EOs0iBIprIDIWv63rwYTvBtDncwsLZMaaViRMJU6QJO+TZACexi3aAirweC

6SPoM7GRxI2aXeZht0Y01iQx6yeUJLalnRGG3Kh14u
rdud03PDIFGnmm78gjE/pe0d0Js9ui0ihK8dRqfdGq7xVnHGEvO12CR7lV6D2FXHMWLcbqTqc/QsHJAh

FJDrPCLRE/t+cwoEgdEMEaE9MkmJqBxkhzSOo1BHjNlVrz4GNZh2c9VFlNouPzsaXRisg1mrWSSyJbIB

gtsbmFvwqUJuOVWEP2B1dBw2wb4C7IBTlaZJp2AGU5
dRXD2pzIXtQy7JTeZSrS6DM2wFoc2CcVtyt7bAMcpUL/fCsfhx83xeVvRQ2Qh/Val6jyXreuPxkAJwu8

U207eOykzom1I8dEanyIC27VuApZTVet9M0guV6xJ6gP4VR2x2tUES3sTAuD4CvqFOS5/K1H2Mh4kxGT

5M+xfAGwi22ZrhHjCwR5/i3KDL8ql07EZimDk66t6G
DE0jEfJT1id0Qqn7MRwblldfdrUuvn88uZftcmh9ARnU6zJA78ogtOFmhYRyaSZsBHfEye8ftZbJm5s6

kLryenovKMequLyVA7CkLjNgjXw8K3kZqwkYMyzFpJolzXQuKJsOGtmxQ7u+el2opJHCnh+6PCBZpl3f

xOVJgHXa3DGrDCrE/vS79ohDpwi9jShglVI8bXf15g
ZBxxxauLA7zb8+xr/44prRfP5veR6HaPF0ELZP02v4xLmEFLce0kXCTo9SsLlLt5rsS5mB7kyf8+stNr

jTjG1DE7UQXBAlNc60DRQflbE8l8eTIHfPfV/z5PyoBxkG6YAfakwLnsgvYoNZqTnbxMhEDLL5BiNdWX

dKxw3e+C9w5eRtf/WPjAUJW5b4n3m8Aqm/TaMW6rBX
VzfFj4S+Si3nMSjNWnTG01NtgGGISx/yWelBf2P8wE74aL9N+SBQtykS+EiH7erRnAW8fnOyvMS/Prkd

HwRmBDfwtCCeLrWL71d1Fv+vcL5Bd/7afYUtYkPn3wCYvf3aS1Au/gAnsnNgFCvkZTACs5U5IiIET/pA

3JVGXDrlUH39sBIu9T6BnjQd1bAEonexVo6//YUIxw
W+HTH/TlI0SG3KjAwjWT/QYclDMZ2jCDXGnNSV8LSCsHt8mYNq9tc0Bi+4GsXI49LBsFfROn8iVR1i0h

TGHLtb7PR8iCmWb0syUVnjC2KuCnaZiI8bTphqqaGmCvpA8ff0Pq1E1CFhXpe4UMooG006ST0lu3670q

hldPbP0FUcNlMqq3WY8DKxgAzle6fgXwo0LNbW21oA
A3uGDIBWsjOJS9t1QDlWpJSReuvDJY6pjXJox2jtDxznhlJF6+Xq/khqpfT63rl0qVP79HjLUmxBwEs1

6TgVrei4Qowt4y0IOXO/4XxId/dL+gewnkNQEiuMQjYJlz1UxfPLyT5FPKi0AIUMZWYhhjzPB/RxxGfK

diS8l2XGUFacX+/tk4QAFNIw/+31jKIPuy6qyxP4wf
gyqAKPtXFpZ3z8AEOh3/UWoLl3zy5Csk7EnNUcM3bBn/GgPy2o+FZGFr3V1z2iRrYrnT3B0pUJHHltwJ

9Eox8lYRiWZZlrJLosxw+peLFN0Ut2s76HojKv0NclV02SdElLaEMJ2FkSl5LW1NonW03IgLFtCJ/ZkZ

p88nJAHRDI2ao553PRcIK6CKnUu6yZkP84XVvp6mJ7
DKjvxBjroa6P/enu7eoxn0Y+gHHVBaQMA2GlWAZ0M8pWw2U7lnB+tukYgu0ZA4CTcCKIfTeA+72zxe0z

0hetyUEtGMaCnQwx9kTfDFD/V386sGWW7gcrXkXuVmQr3a1Q1oVd1+GLZIUMSnnhdqVh+VCpPAbsPLyf

AQrbXf+1xPQIHoBFWJx4Eqw3N+P+CJfo3PtbdPYD12
JsVx38t3Nqf7EIIGa6RZ8PxxvaFBktQ6/fgbQgLHz3bxcYfpyCLI96ewD7py1Hxtr61A83YRPT+o38Te

7Twb2BsgHfDGu2UAa8NPp/BQVrLHT2NYAnYuQ0jOruTJbRzXO1D/5SWELrOzjuBtPkv0dQdXrY9u5aY+

OaTeU8IUcApt/FxiTbpC6VHXnWLqbxxbqve9MqsJFI
Ybr9yz0lhzFSg2nWz21Y3ifP1TuumA0wcr8qE378NyVir3KP0fBlv6RYCNDIThj9YKecCMY7YPyyotCc

+h8p5uoIcEHRyB0KYfVVu2ob9EEcoj9daXaY0rSAoKofRXNB3Lw1do7+8JnpHEkM3OwshN7s5HVOydZC

CwkYpoF9eLhTTI1u0uRHKvNkoAweBpbgeXe7S46+jm
a/2D5ef8dFhJePuj+wAgjcKjvHuXX3GRS3WZHQm0LDqTradMXbwprb36jbMAwBAFXFFUOp8THqOJVtQP

4rTxbP+qo3WGfepEMaBlPZcTtxf0lNExME2P20ELx5/+0Y2eecHOcUp9a0vEUJIk4ETMZXE6OEbrUWin

yAkzK1u7FjeHvXkE/YmB1doIZAnP1ACjerN8jnfU89
PD6lTiQfgiSTrjf0BmT3pO9rrLKi3MmekmhoKueeCvDCZTZdeHZECDC1TWqWuI7Um76d0EyuyVBsxfo6

ZnFLsmvoblxaKLHkBpc8p3wuSiEVcwbTO93W3FDlltXIaCHYpvv4k6XgIwX0+Qc4FdeMP5quxFwgeQg5

K9ogJKMyeqNl/wIGNGsgvcSycsUHy/LWcMYa29u/75
75SomC6RrGKEWL12eeMaxapNiq3YAm9JsdocNHcdc7aToH04hYjjnWJ8OhN4fqsxMuY9gvhDEhwKUCfe

0q9n21czhBJSm+3akY18DsZ5M6g4oH6bfU97cMAfjYK7wol+nyMmHgZPVs2gE86oMZCJ9ORJrhEYKsGv

15i9AAW8T7JsjQCweTl1Q2+2V57S1DvdMf5vZy73KK
fN1Ru4kyp6hIUAeqfLrpwgRPMC4AZNQUGl1iOmMlj27/IDP72CMjQcoOwcdNaWi5Y2lGTIzwgJfO4Q/G

l6xwxLf3gEgMk/GHI3M5FyHiFQllrhvucLDkbivICn3n9Ajk/ULSUUOoDvYdpd8a0/qgF+o/jhfNeQ7N

lnn9XH3hxuCea4gFG3MRSAyCYG/wFZLYGNdmWv5yYn
8AYoxnm0pR9lLaDr/gUoogAnMIWNheUrdiRs15rEbvpZaHwXcBV5g41DZlQnxmjbI4vCc5OtD3rQo7d2

jczmU9LqgfRXiyAnmIYGAzqIwoTaFkXGgqyjst0hSHKL0z6qg/u+xubFJuSCPv33bO39u9Kh/TLscZ8T

k/Rug6emcqnIzhP+v4Iiia/DLinQ6DRpPDYzrk7T/E
Cbo4nDILsykIKuxs+Xigxc6zN+6BGSXOXkGa8xilwsqMW9u86K0vB4BcaLvPwrL2leSe8QAwjD91kxLJ

Ln7lKyAfL9l2wir6X5DvXeCWzVN18OHT+6RZLKwCEYrPaRCSt/ls+xCQcuc7XA6g2cd+qQ0VDpnG5UQI

TWeVRbO+hGpwrO/t+xgHjW6zXztXYSYfxtb32oaiG+
cAHla0qUcbF9I3+C/T2NNc7qyAV74yyx6I8+V1HFDgvGtO5igReh+ZSqj5rA8iDIMvq8b+Jf352Z41Cg

eMgnnRfwFx+KZsAAqxAYnGy8m/+hFv67kzEGMuDuDsscNY/p0sKRMwVv+jfnsSe+vy96HRYG4RohX2al

r6C4AqmxunVEuoqMreXpo61klztAyCLO9uaGD7SMoW
62lAZ7vgIawKtqL3sMcHsji4x1U+lZHJ8fIMXY8+6YfEJIPmpVOrn3NwKLohSjvt7z7JJzuq3862MnJ9

h5wjnflAdM88T/IJ8sQcy6bByfOcuFAZbgBp2Yi9bxz/musQJrs2BWt75ghwkbRcuQ4MzV1r+k9R71k+

rlAWlhQSd29dFng35Q71MKZmau0oB3h6FTvbwGiOq2
K43zM9VgbekxFNZw5RietWtSn+NlOxorVbopToMW+OHPWU71/AvL6X9VzYMk3KlqpHMG8cSzQvdgDldc

8370h0gDS+gD0E2wHxHEeJOb/o0r3lN6beO8U3j1UaC/rXxGsQ8vfYn0V1MZ7uePiVtQ0rJ+CUeWKtF9

/zpTrrT4vjd9T+J5cDw5TI+m5P9RAZUhtdP0KQ2Jk8
NX+WYZEo05QsaTZ/DnNb6bc34nN4RJViwR/57Zxk9zQn9rtNHextU4wrRpa2YA/usPS8IYTHShWLiz3v

F3WxDBH2z8ll1OtWXsiN6sL0zXvs7WEANb3ayKHVLBRzxaC8oPAC6uvi7qnHjycgRr+j0WCVbuA/LmcS

ERBXCob2Wf9bqui8Hbt1Pz8gkQgDz8njU5z9Gkjn8n
6wHAmYEvZAPb0cFQ7dKDS0u1kf0S6LOlT+DEGhF3+Rqf9HW3w7dgITvmuXuqUto85vaCF3UhgRdgmsRA

FnC8gSvuwaQ48TnDbePQ0jr5YkHodDOtsBx/0zj8JmRSMOIKPm2Lz5z869eYgQYlzh9z6zipjMysIL68

tAKK4qUzy1YJAHqe19nzt9w97g0NrfrTOrYx0HA1oI
qpulxHLEndwFt6foHfvpD6etkHbClOCDyF/M20qVSH70N7ADPGpOThBexH/XBVSO5cc2zDfmI9w/QoU9

pmEL8J/H7az2+7fb7ntQG0iV87Oyi6AB6iBioV9fRsHeZXqcX1GEYuO0WwnzZwzhtjT55Hco84Uuefke

N+WI7JOagyUCJ6l+7Xqik+f3y9RyncnipYD9ol0fTT
y8JcbQsysCPjmAgLb5dwbSRrdJmkLRPXQRXrf279zqk3KSPozmlwq2YoZJqDE4g97zvB0Bb2kByw2OyR

UhccVxIfZVcXLboYEOYl8SOfxSPdcaYMClO1t+lMYViuqqQ+cyAb8fAiu6RR+PKirc/O2mtiz3u1pEVy

Vz68fIaPar8GJsbzfdkcuh6anxiv8A7kkjILfZ3JMg
XZio+FrtqAVzVHTxQFgQLdKOyoeeJrzP3iL/ByBriwb2Bsf55l9oRgQUk2KMhyxbR9EBmuBvCl2ykss9

BF5Durc9oosGsYBjgrXUa0SSpO4022+s7nBsan3U6QhkpTrFEf7dkk0QyOgcGdE6xuBwse5iJ6YUkKYm

[id number]/ENqcFPatrz+GZ
6buP88tYjiW4Cm/UnXfUFz3anVRNf/14nWf+GK3PVXzjxX5NGO7x4MZ7ufuVwxfYVtB4X/PcQodSwv2U

jFzT4fHV+kkUvvYDSyqeGz2SydSko7u37owk+K80ugJUhc5goXTBuq5+kSjSifAdqsL6vmUPm5vCCNlT

C5S669p4G5K+aJEu9qO0lYq1a6xsCum9LiDy3tviIz
OXNXmMWlp12Vx2PW69Q1aASPCE9UxlVgGbJjvMLDnR/mlHYYcI0g4oVc2LhsgFPqiKuF+0fSuwJkrJqz

n4heaxzeudveu20TAovuQ5C2P3DBBLAnXIrxssnXcdhJ97R0QpPSu2Mg1HxdLv71ZOekRWXpmcum9HxA

dYK7QT1jUiqw3rD5y/rd/QtyH3pCQsNFkTCJgiDVko
hSu6wZgSc/wazbAaa21ojm+p0nPrSH186SCQ1uwZ6FiWEtRaY+imARlZdKFl0Ejbj8FOi8tafXVDRRyM

Ow/PAv2yFu1BO5+JXJSdAb1fIciiZmv3XG3lyXOOIla4ytHoImnyQ3yAxSEipg9BP/Xg89AbfCdd5L7a

Joaquin+X+V0iK6pV19F58AccUoLP09sCe3YDi0T99kEYA
2UWrHHx6zv11GcWYSxG+LX9tD1gXwWqBi/c5YxnXAo/7QypmBy4GJHz5Hfp32oCGIWVSi5F1PuH1kHKD

dgZfDpAonhhDZdrLUAWNEHlnaz0f6Ig0fX9ycQh2p1iN/w2MUcalq5nRDKNhVuxJ5Fv9lmCo4oCWOwx4

Jxsj0pljhW19FjAAbaxRrNXnDUCLbxjSbvmWkMyF5x
W5Dzk3p880bXrE/HdkUAshnSbweBzKql04olP4HBu9tMrFhXqRfAOkIVYU89b3crbc//uZkaQt/0L8xU

0TpjeUWF2cs2uFumOMe8Gg4y67Z23sO7fkGr7ecLRN8lUKTFzkKOg/MNboCVkrZr7scHyvj1QgUvBWZd

scyKykij5Og8PMcIGI9ic4adzidfoeUNqP/a+E4cnV
3/h0CszSeULFKCoKmeZOjjIu0cqFpQxwBc1/AHeTRul8e6Gwz2H6YFDQm7i6Mp+KW/6WTCUe0HEKtOfA

BiZJHdplMOQizfaWG/FRda4B25csbGQ+1FXLJ8jf5638s6wq7lCq4eObSOOSwU8+IjzCQ7tUiDnwDMW7

yZCQei07CpvcAtUIgQVXqGaO491Pi1zpvbCX3Vt+fz
9XoT3FP8pxMqBipPmMEowxk0jg7q5jy1GJW5H4OWrw03IdUrYW0zkKZPjhNOs6l20YJ7p63icXU9liU/

p962RBojdR8QPkN6Okcl6vSU6bITF6Y/MJM3iSHqLJe0bub5LlqOiQZ/Q0UeOoRp4r1Gjadn4dHVi7oS

dRWBW3EKDAG6sgjJx+3fuyzcyaz+V9v2uZGrqoYA5K
n1nFXfzEyuBr2uIXu5MV0AhpS6loPpL7EgX7y0VL2zwovO4gXKm9A89Y1TLi4Peb+Moreno/3QxxtC6W9J0

2wS77tAgaGDYX00n/2jsPkCcATfAdx8Or3jiSP4p5jL0EnBec4lz795LNKevfgCDYmdKCi603/HqTGH5

hGdmpqOX2IjT82TbDSHAUXSlz0OVZjBASH7cR8k8ln
2cyjKUjWIWpF0l6pUE9xvWEuUQBLaSnoLwoG+qHchZcjzO8zq5EoEAoPt/Ylrm+gsg+guICTRBTtEaLN

0XwMXU8HpZJ0FdVW6Ivhbcw2W0p5MJzkyrrnCbNlnxUIP54ULgZaF3BuEh0369QDvOeX/3fLjPYIZL4c

74uNSTr09Uo+GO9lfx5SCBNR7qMYUsSBCPsk30BlN7
prk3o6sT5ojgfhIU0yJBzuM++Rbo80aolbLLI2mQQSwV1EZDsbm26O5FK5g69htflYw0Oo2NAwU2dvL0

nRJVuP8Ts5dGblhUmsLn0fTC0QfU6KFR1a5c8ezupk3fAdj761BrwprmE4UhLoAOQ5772gdf96splkZT

/AtIoveWlkiSlUAKUqwdMLgk+QRcFvOlvEkt4AmlEK
gW+ROxE6hq5No+KYPX5YPcl3hFHe09WdTDTHgwKkbj4WILx+ASVL5uqY3cF2fBXxwmMuJiZTa5Bl8O0J

WEopD1sYPaePqxitb0pbnk5xuRh0vYEH3rL6uBiHSRJtg0vfWSmTRKLzqiwEf1eSffGvGvAv0KBN7AeX

OSA9GCh/w6IcKFr3WL5X99p41ZEa5Rkyrx4MOFhkN+
IgBv6nly1fz2JAh9AhE58l3Okya66HPHWN8XJXs7yp6HZg+ic/LD1EnnW+CyR2lmmkF7hz1zBEaWvMs2

iFssXZXD1vaxaPNId2rp2fMf30C9ZJgSvMJp/KfpuQxDDOJ6p8UscMl2rmvvH+X5HlRjUfD1i11pt7or

6dMqAPzWWWO6WU+58VbWJjlI9X8J+e6gwh3uf+MtPF
DarNxQJxqk0e9LontdqPnLhjvUypYyI3bdcLpHooMqwrRXrwnszQb2YG+maVnXUZkB5Itfn938lltmVl

jSZDd+TuCemUvLmQlgBCKQrsg6nTGuPodoA832JJgQbLDvN89aVHS7gnToTHK/F1+00BbRt4KLkhn1ES

QMAPfZ3sH7Udi4Y3/H48CVGnrEcfeHQOGsn/fQl/p1
sYuKId+1CwslErWSpj7Ms/py0NaxJnEvH9ogrFnU/5ay03qswazVuU3fAvmyfNY9sr9j5wl5Xt0GTFac

ifeHLTwZSUY0+mnQaygan+t3nIlMcbI8rysH/XziZ+mSpC0+GrWRUGofZSBKZkl40k0gdsXOeS7kFCFp

0wuq6m9sR3PntJlJBfXMN5ZbDXMjhQbSvbzfxgJto4
7q6dIdb3piRzs0L3B2SoD+Q08Gezly8lJXdxGkNuDZvC/G+sqQAKy5JzRJmMAN6bQG0ssd8FnSefiisE

RId6M/6s7tVsdtPAdb6mKVR8pjS9MF1eb4h20krh5eiOG/bGyzrhrllD6dwnnhud9KHkYWWLzvJLmJxU

rb64ayiwzhQ2lCq/eCdks1nK2ypagSy/M4vnHJFWZf
ascbYjMVKAxszxsH/rjOMEPPYYsqwEsaqTxEOCR2+WXXhtyqy8Fod6qe35ZEpd8nTimOppBQkAyUDioN

xRng5tjgCTCErEVWp5BOtPdEZA5iHKl4RF6Xv47y8H5qqKpQWK5TclNPne0pGkelQKIqpZh/biDTqLV0

qR4dK2ff+zcvNEAojWt5SRF1J4Tt7zMXDz8u2kkXiQ
Jv5BlI0DlQjqbjXpGGdFMBsOpElg7ijDsosJ9xB+13A/5nA6tVeo3r7uOcRcI+L/tbGrhm3AoWeETMDG

tagqMAZNyeJPWxlOTrXbFV72a5UEciTSXISNqLEg3KRRVdqc3SyKowxYbn1APQqvbSE/aaWk5Xud/he1

oPZ++19lrr+9amg08EwkAqOPGE0ygOHEBm0UZT9MEo
/xklbte35JHXDXgtTBPTd0ZugECSxuwFXpP2E/4qSEBR2HqB+NJwHV4mo6NE3xQLTdrv/MxGlCiSgdx9

oMoc8G4uak7ApBUZO4I01pt9ipI3Isy7dN1WhmlQf4KW5fW1+d9Ul6PFbV8nr/LLl/UiAHC7XsVCytyK

xYUDuddN4M355JdWdDJwIYh6ifqo/B/74moKs/lr9f
nQD+K2SLlMKFBLrT8I6s9PPlIH5q72BIFMsptrs1ITqOpJni9dr9d/3aQQVlH8E0rpy51ThA/53Z9kqI

hgAdH2/a1p/GFm6DH/sdbSZ+cvnhQdLJmEEp9FCEFtZ43pgTwQh1HPKii9OpneT3HqGvV0SZv5aIIzkF

B7Mc91284F+qrTnKcm2LSe4GLRpOsFKIomEgAa9uaE
1brcyeuTawg7SgyESOzAOpq3TLSVxETgUmxYKTyYYi3v9W6HaO1LOZXApiCF35AVqOWCUVt2YYodxc2z

LfEX5lnZzAj+k53E1Xvo/kvfFSU51YXYwO5PnAKCrV/qiAt4kj+571rSxGYFaZQsNjJDnquMNPv2nn4P

DxpJUqzG9BdiQRrMl1bMrq66SZOjbrYNvUKV838WIK
Girh2jxxTn1qeLt04trugUtQdGQyftQLux+WwFFfb1rdtcv5ewfjusA7yH/ggs5BdlIddcaFw/uyaULk

YdKJs/z2JIJ+Q/95a1yO7/G61JUrjMRScGd9Oh80z4hr91JX5a7MB2n5GH8O1bBjOjcNw+uwK/oleKDU

2FXPCO4xD6RFkpb37S9MiQ21C91HKH935S1GZdJzhU
es59uTZHUbM7hnj/JboOFDzrtWdqXhbGa/O0MCQV2Pwrzj7Vyf9NZ34LQaHG6mkXMP4W+sswg72jQxku

e4Qn7SfQsxz3aLvRKa74PLEEvUiTau9GFQ/hYL0M1q6ZL9PjzT7SVqqqvUSN9ocZwidhKmBpYTK0fX6y

6LnQx8bET4DAAYgz/4iXcAhp6kYTZSg2vwEHlDZNlY
p6SWSLrcMiV79u9sk6Dgu9cXcLheAbso510HT5MsraDu+TLcrBvppi9FKNd6UY7FKwZ6YuBx3Brvk03I

LZbNwcVVm/E5nQfk/CG80r5pOH6YeLYEBKDp7pa6h2k2zrSxHvf7VK1rEcIzJqjCNT3NpkK77CuBbcYN

/KAOmYIkcDpWV6y95+1oUczcc2jgl5Mi/pSGjGqwTP
VuMJdjvHoonM82aeE15IIHKNNgUvwB7xaMW8/k4a0cc55E3H8vtkmnXudegyReGLdli7Ap23cYgG9Vkz

lz0Ttxvz46p+8Xf8oPkJaK4uhRijJ2MrxHzqvRdUu/7RmMg1D8nIGfwTT2tyD7Yc5X8j2DXtYtdvNKKi

VqudK+0IMgW2WLBfmimhN6at5MYmDr8wI84jK5yGAi
YpDJx3gS3UuYanUi2ZGEuwQGkXLrayxCxZy89J0rPHhdW1DWzlBcz4K17DZ05VtyIr8iqYoQwdqT5vDO

/wHfP+lXR/GLcYDJ8POhIbeWQlK/6UepWVuUNRQ/hQ0tQBAm00pE850WZS9f2OyfPD45psikgYuMEXNB

gnDkkyW17gNSs84dNqg+EYNLaSxyQzB1n41njUzC1f
DIhIIg21Nzolip0qddpr6TROj4T2l9vQVYngsqHJTs4Tjtus/HpyIs4up+0uzbi39x5B6qEq7k3Ldu6L

0yfIWekflMJbYEj7vQ7yj2r7NAViMfnlOg0knrOmnCqk5iIpCAAgPPDR2q2ykIzTv8ii/uZLAaZOrqZx

ovzP3viXooGowUlyogFM1TsmxFsjPpdEiPrSv0vqmE
5ji6pDnmXgFOQDbAS/NzGrZz5WJPiVi8oVruB2KizeDwIDIAiqmyC4V+1y1JgkEBFsGNt/Thhs6QZfz7

wnHjiC16osY0ghS1N6Z3I03dTLPF+clTpVeiMLAZ99UI0mUj9BTxuYsAsPmrFTwkKSBsGETCJ3h26itz

1hSnR14C35fAkTJ2Z2OJB5CW/86L8ywdnhTmDjjEiD
sMiuMwE5dnr7nLPrCrBISyKcFOGvQF1DFRxtwbxR4RT8pyEumK5cstIa3nvyZzsNhUDJ+13VICFC2wvu

5JiWC3o2mlGJKxudvoCO6nmaHOQ8s7raD9qpw9EcB4jyLdBOQppRPiq4SYIw8BUhAjZ7jqGEdf8NBV02

9VxWhqagkIYwyHhbyajeAwCq0GvqgNKcRXR0SpI00Y
9YP+ONW+GR4oHJjN97jF8dFIX/BFQFsYu1nGJ9c6drkYzGfrFtrfZiCeQ0mhgftKrpYZ3DYPCaKF+GPG

SMqLdjtUYI3jnUohFkizVsoNJI2ABHirqlIZyFd+RpcxbjqpcN2V9INuijUzbvrzxRrXpFggwSUQqKuR

/IKRs2TFkl/pXeJjNbwKxOAV/uW/0wT82xUcnBm3H3
DhRy/p/N4WnMwfXJacayOlPHn+2rb6rA+Uncaml7ZAWath/Pu0C4H/q1AdPoos4OTcOCIk7x5Y9VbT+a

A36tvs0a0WQf7Z5acwqosl1KcIpLHUNvFHq2Hm7BzEJ+UCXtrs/pzhSDcxm0HhzW9uK77GO+iyqsJ/sm

HL2v/UF+eQ8t+3ThOZCl27elT+n7yduBko4rIMnLL5
47I3HBsknj5bELEYzrAioVAQFpIRxbpibPFxzFOeWEm6R8iuCxWSpUHaQJ9ShM/losU8z3sJ3cI2IiJO

GvrEjNdNaE7zlKyyoJ4ylj0jYCBkuX27jIT9EJ5lYmPoCByi+e22Y9ta6Hd7i/SpXcLvKOR3+NvbM7FD

cRITaPL3niyf+fS/Y6ZhKV11PqdPKVLYsRiUzvte5T
qIUkgAmB+7LPy6gz4G36OP+oYVKYR/Mo9UQ3BWd1pON4ddW5dtsLnD2g8dw3xVcrY2b1zZkZhrl29awD

XjwE8IN08M5naiWjiXxlP0hdlE7ZSn0jhNi7rK0kZ8lO1C6ChWD17DBJiXZFJLirAxauFFb5V5+22SFM

oKLxxV4J1juOmlhmY2uJ62/rxn8vBQXjAOWxzrr5us
fpZuCz/1xdmc9maKhksUw1s1eA58ziacGOOjN53aHCLcM0N4mqhgb67Bd0usAXccSppkmqyJ8e8w1RdH

A9QJkhqz2sOL9kmeMylmGxJOOUFrQ0yuRPDlvfubloSAJYmZ/3fPL7rCiNl2gWOvwjIV5/pWTP8m+cXj

w3E+0pYdz7kEavW+mI74VAwtDEleqIdUw9AVG83yep
KP3D5X9xnxmf8v/Id9WTy4vcrFBN7qTXIO+KzmZgPZ1q4kEaEOr/gw+9J/iy7iYPQq9/LiJSIG3MRQYi

0IiJXqjIKMX9iYB0qlC9zkASvCafpmFcrkvvJYqzgrUnp8tm4N3mtiCMajc3nn9hWFBseR61eYIJqEtJ

fs2Len6fvoZHadEKCTiCUK7spSMKof4nTf8Oyborsa
keaac9IZlBIxNcTyF2277JvjMBmF7HpcvGgih+J9aMqR2cFbvSUswIcypExGxpVaR/DV+nn4v86bHnv8

eRwf0Q8bULAWPUUC7zu1zUkd2h187CRHrUIQVJ8Bt/fhfHKXAlk0Tih3ZEziaNFOpV9xxMGKd4/NR75/

zTDfcxAVkieP/axWqn6qF2rEliGJkTk/PN4SqLeL7b
oU20yCITA8Q6PofZ72bIlyMOGIC4JfSUxodeibSpjhYeO2X+1mKUCYoQxcUi0b7Ngf0uMUabKTpJB50s

PQ5fcfPzAbFM8OF4mTld1/nLVXZUE0wsXLnXQlx7foIbhjbFPPDCPXqIl2KVB9sFtbpmtuyH28+rHEqN

bfNeb83A5aXeRDAA3KZhB87cYwQtQKw/rGUh04ivyy
wHwJlrUUeJLdoJY/H8cvEBLgfHjGfPjSjlzIytHg9q6zBzGxzIoHaVmrhBV6kaGPKGtb2NXOPLvSHjnZ

UvnGm+xuDUiQg2KHA/8uktJe0uh1oI/vyot7ZCwX2VuuRH/0K3ozMG5hNKIVy8GYz6V8m6S3T+20X5Kv

or3NIDEn1WYgtOSEteeaT9IrtOybU4gnMrjHwJVARS
xazG5yrKXIwg9he7J1lu/k6al5H/3O+RHGnIQ92lahB6vQy4s33mCZRALl+l2MnF2BqCwB8lMkn0n453

5y/+1jjix+Wm9CCNQBll5N2BUisXqj9p7l6ImuiJQG6ISq/+4sY1X7Fd/Oi14QxhTbiRRboCYch/yqF+

00n5eJbaWoaovgYL/9o4IGsoiNhL0V1qXQd7JV1lcl
L810SZ1LL67h3s9YjkA6J15awFvMbDh9+SCM9w3QJB3m7qLHrvBP6kWjUVYqS9e8AO0zMjJqJyTsqpc1

q2e/JLccKdS8K8pMjZastXw6m5fMNUCgSn5DtOpxUsxki3jHV51AUMabdwptatULCbBDHGaIsrR0WxjD

FpzjKLBsKz0LBRe5EGBoJiEnb/DP28zfbn21DhcQ6E
SZmBxYPBd1s1a1u/WVmWBc4IKX5If4Ly/muVS85mS8M7lKRsgx8agMjSIwBK0JmY2ENDQlxvNleCjCiw

A2iT9qcBwHMEkVhx4U0BGzxqg69wq/5C7dDCLhEMEVwHFOxcKbnuT57Jh094hldXk6wVkwph33H4F6Gp

v9lija6YhhXbzp+iUYT7PAFbFZEJrwAcFPF4OFeWs1
Hq927tpvhQHpIYOAb9FGdru8vtRqqAUocAhRSahgi7Fqz3c6BZHCGidBmm6T0IsiBsxfSOehAP7VsWew

1lZqeqa+4KhRCoJ1bUQMHo/iVsWUSE3FvztTh4+NLpJwQxBFSiD1N+jNR9Tc6ZZZ24k+qPAWrm+I3oN5

1BEYxq6IWbJ8X84ewKfeSejWmouEnU3WVYSHv1ZLwo
s+tCZmyNHOeaqup5rfCMutpnQTJAlWrrU2bPrElFQKGdYc2neQT+3/W1f0kqTG1MiHPsRhApl2gj91CV

XXHtcqb8R7Q8sF7VoTUqwWWoqHXXCrXVuVgR2DrDzOXZPm4uJ0xzoCPPIuIDivkVuce5uBMlpqKNLaBW

ZBodhy0kaB69ukFi0eKwfG3OrKV/0cM23tuWNIz/cG
mrRKn5jDWQ/LTks7uQsGnu7M45kwyjgkIfBKRdi30pzP2I4i6ifm4DFZpM4FCt2B1IiSLK5mNIgGxRiQ

U32bBKb3nkGPYDxiLTaYaue6Hjzh7m1LOLCJVzjJjPDPmsb3jk4wtuoLhTnsHJXt79B02LAR7G1znLHT

nwwj/jJHAkruwsbSa1IHUk8OFVpwJOsVpbzQu0nQtI
mzO3NW5pwAH4maZoEw67tDPdn01UHHITqxXkXgh70FuTFGTCR+pyNvs8H5B4gQMksdMiqabDEyZaHF1o

zl7is8WFGk7L25WL+n0q57+QydHnPmeQ/7EaSkIUZCpcc0y+Bfgv/F4RW6vKawb0QjLqtEBJFQiuA4o/

UNWzwbgsmXpUtF1wW7OcZ7L/+pnCSuy1hKm3PBFCJ7
42tj1/kk906ZpBkD7P/aGSde2cwaGliN8oil6wd7xXUkWrK2hb5psO1ckumyHpdKkaLf4LRAcBxyttoA

N/fHE30/ppcCh5Fb9gV02m3GtRaqBCSzk1m2LpYnb0w8+mZPTLr0jI6iuC/iIiKMhzlOA8PmMHG8ZK0P

0diUbDjtBsjbnVV7MsTs/0+aNQYjkQ0C/4uiPpxVbP
dB0AvlBreB01apsz0SFr96/PCbleFIpU2exhw7jbg5iu4EnTsGYJxnGL9HLjlV3QoHq8erXySz4YdFDP

StAuN4icFTzT69FRyLz/QsLN5fItS1T/eVLXoGVwbBI0ohPHxv3KIeta9eLxecBnMNng+ZLMGMfCZdDc

a9d4is7k3R0LyB2TpfMstvpDR32/YpJoMOZaF7KqNB
1g9HuvJW2vF7N4Qtk8L5IR+AbnV9A2FyKpa+GEA3wiBNCxhyyBnsCmjEdgUuD90Rvq4VlEamhHArAA+F

+5WcB1Hl6lzRN3+bWE06cn+bXZwrkR9mBDdQFTIMckLZLy47j+EqP3D5BPg1KMnZQbWmtJD/cfxwmnyB

ZpF98f+H7VoZfBzB+p48pEVGF0ufCoGtFTmyv4SVI/
M04JwjudYbsmnQH3rEo6KcMJpjSbxmPfLUd/SP0sH/AMhlqLrg+nNNTcYMP8nxrrIFBbduoN4HXXri6c

+LcVCtrqc2G/uiMaUHYMbaZUkIF7nMzETldOsXnb1opLpSb5hPLZdamJo8Pd2cXkWNayBUNId5qLkj4d

7cGtRBHMS7pD/uNjoRlJzGcaPnHU8Lf8EespWLxOHV
buAQ31+5iiGjqI+jD44xABq3wZp/ZIGA7Qo95tv4E+6vVyU15SyizkrIFU0GuhHrg6LFJhU3iXCcTvb0

0f2RbcEpSjBkVhEKgszzeCBYL4XpW8wdGwHQkp8eOKlQoDFQJ5nM4v9cEtKyIzbESUgF0ooKLrfNOkuc

wl1S02gv3D7aUEc93yWnm1bNIZn6p864Fxp5hkYz0f
B4rXQ0bUGwLdJcMCJpuA5047E5kipq174bRp2K/c/R1O00DwUXKYHGkXZG5x4HiNb4ZnZ/gRXJ6qV7LL

UPt5mTVA35MCnLDNwTnnMtlxvf7HVy4pwIJhALpATxfo7Fp3eIu2gYZp3RvxsflObrTOG6Yz4C3kWkPq

tVrJm0km8az1nNteamcOkclgmBntLr2PLxO0PDfyo0
8WR3YU8AMaq6SGITgg3BBZjAQFt7TB9KYWq7wB2waz+gN3zpX4gt4SCBlVYN7jb9fkQ1AeTLqtchHjvK

qTdqIaw4AizxJh4laa/Az0/AEXOVjeKUnzRXaJFC2Q7F7NIhDj34JYJqKcfTi+YNMfq1MrDI+sLEg9sW

+9G0Kif5d+fEh7isO5w7P5y5gwBYai19TI04vS1hFn
/cGdku5GmycX27bflZWwtFk2p/nywgK1CA+Z17vERG2hfLh5dJ5DV94hM+koiFOyq7PUkYLR5XsVTYhT

CSEd63dJ3Tap5wlzSWXYcyXGs9XbBvkRQ6/RQRcc2MYehngk30UDgPni/7HgAI1bGkCzvsWlx2JjaCC8

pKdyfnbCkauUKjFnynoWiS8e6wJBW34bxw4SYoo+47
sUPu122VxkZM81XsU/QOEZYsLs0HT8dqyPNvRmtWnafFnL+2qmaED9knFcDGKCn7SNYuU6XRrNurY6c1

aeHUetUsyI/MFkGf2H9xpnuauhs3O6RnGkUd17z9RD8mygYXfv/gBYrngWygRhYaJhg9ybwN9JKxVA2U

YLB5/5/udvoowpiDjxPVqR+ZYGUuq/OJGzc8Sj+XGb
rhZHckQRpOheEt1mv4t0eHFxylLb6S3UByMGsPwfofduxAnHrGjzMLkgdo5fx6MOwk7VHvksPlqqwloi

bA5FwsRAueGX0vSt87WHyp3SDctIwiqM13LjwALdp8xg3Agecm4MQ/0LogXApxglA25rCzKyndTcMwD8

1jZBhht0hNBoOIyB4JuS4lAXhjiHCP8f/sHuLhv7vw
vuATi/rlGSXKtJMcwQqVVIfIawblWsrbnOBfMvqIO2s+Pp+nIzHmJJGAIstHoz8X8P7qhCjSkJ87nR82

eVbGKy08/dOuBQdKybafICAL4d0EFR+ZVLCqJil4anHoDNIGFo5Yfn8w9Et3EMPNCcZz6wvmJaipXH/T

BmuLj8osOQKaq1CGS3CWktSWQ+x3V9D13q5FQIwxdW
kQtw0/xT4YiL4IgEKpQSSo5u2VYbehdQ6R3jK+dLigB23hlCGmntdtU3Efcszmgg/db3UuXiY5a5VII/

Pvaxz49/Blt25c5CJYpxDFmMNgEMG22hIzih65qhMwZl69Dgv+EnylHFyKszc2zAxZ/ToWyJMseu4IiH

fTNyWKRep336hlVf3UkZCIE4VCoFQT6LWSC2/ilXu3
K+DgMt0nln6HZDF+1y1mn1Op7RTKQNrBec/SluB2rKFHCQ80XH/w2znOUkTJlQnFK+a3FINv1lL7tNZh

+o6rMy4bdKQze1u1f85rxQW48NmdWUMufDUDWVJtGM0BEGW98CoQkKKeHg6FDvLooAFtJPm1k/50YZvR

WJcNfq9I2Mvs3h/WfdI0/i+R06GBIKZ7iooxIP6JGA
bUchM+39I6fqJ20it6jeVAB3OnqDYzMT+nKFasaaEXrrOPMmL/yyX2n/Wx58IEiX90d+5cRUMWH4pUpq

iTDelhJVpyD23Xmcl/RXx+/P5m5OhigvPYvBcVruezTS49Uzm/SrmCk2IAiixqbFAzT+64eiu8fXVTvz

onLhVMSB7zYxlLKii7dWeQWVPZ//DHY/xNdKADzW5i
sGU7rNJXtTqXoa3d9qxE14riqJ8frUu5gNiNZ5/rPU0A2hKJIdd9H6DTgniOSm0aGjDUVijqIT5IOgR/

z/GKpeq7L7u6BJOTGB5IaJGyC0993k3RUlr3B+AoM98Ka9iYjSvS1SFKKfHFgbjqxvQK2WHiRSSh7zGB

qLX/XT/iu5PY8dUXue/NAMG1Jj340CBoXNwuewYpo6
7G7t0DwDP1jDRMeiF9tVipvo4f5i0yQq0DoA260MEiNvC6EXVi7HvS2/e+X1mdOu7hJYWrAQCUfubA6F

TTwkaL942IdMxKVmJxm/YPSejVa2m9lP0b3je8zF+CpjdW//ZLXzGcfl3AE304vZMShfo49swAas+jVi

n3aQxGU68W7SJ+H9+eApwd/CkIt1w0vr3Vmn6wfYyj
GMUj/u9Lp8yhmP0x9Cf8+JCat5IH7eRIxQClfp27UJZLPok9QnSfme3B21zJxpqm3cGeNB8QDzl7wbqk

Dzk8MQnlJm0ncnv9fUs1ZXxEaTdATLeNyuSaapDvuWoN9KnxSfoNhieB3FoPL/Txwnsq86/9R/vgegl7

cXW6jr+IbkU04fN+H58kGymGcegTcPlzw/H+6mR4pv
VZrteo4B3BoO9YQ+uaSamtbawy4DIu7EG0Yrp0Rjij4YG8iTkuJUL+7e4VJH6FGzqR9e+W7N1PBHK9nr

1jaiR1GkmaqjhBCxwoP7QP29G9fKx+0LDIjPRhNFLNJbFRiJF3Cf4io9Jm6ekOS1QPrw5zT8eH8KM0JS

i6f3mu5CeLpVU+mX1W0Ie8orVY2QsW8XCZBU6bpAtQ
9MkljD57vli48FLMLiEoe2jrWq0LjMJz6duDg9UI6uePZsHOkmyIXOeiUnhWXc6IGdvAmHK3Bz4gHl5p

3yrELBm61iAZF67sE9yCaVRuk6W63k/uE/6obbvtD3mRoQHdnCchaFgIMwmgVuwjvchvnCVi9C9RRSRi

fao1HAkpRaFbmeSxVcQhMn4REGAbB7zoO2DcoMGvBA
8ceZBOPtzeMBC6oxJE25JvjCRE1lvAc7nUxUe8+wB9AfgWSWpYoGjqNr538AcIawzhkcpS02a9lVzOZF

IjYGdyderwjsYNLuTSPr1Dx5y+mcn455vmjINOnTwd1cIWSDaj1IfuTVyGmxiDLS93wV95KmdOnZvIpR

iYvYEOOYv9fS1tLQvlpaYD4KyA7gPeiyhl1G4Mma6M
hbBWKhRqvhC+xWyi2GuqshkTZwWdAPxcAI/DDCKXGctqm+/sddmnN2LrbRJKoziNmMRrMA0LKQI2HqyS

50hM3GSQ9JcGg8Px91jBkH6C4OE8C3FOvbg+61FLeUIJ+g+guQfD7TrHPTj5W2NbZ1189Q01Tarvk+wc

0hIj265J40Z503KXDzD4K+hvgsGVzdvac35Y1KjdeW
dCgXG+OzNNzr2YYNcfqrmHYsqmMXo3VRKkFWSxaFZof7cqNO4sTftUWP7dvuTgU4AfiI/NuEycZDGSrb

BNflYGp5Sl6XE/NExbQ06SilD//GH75ycdLAj/Aqb9FEZnFdHO+QgHk7q8V2v6GjK5VWOaximTjy3PAa

DLB0VgUU642HrzwyHl9isZWQMqtPCGbnqkU2rZnjB5
PQtjeK4qht+8ilPP9VL+YdzZLtws/7skQeWeYmKQbxlx+n+Kw/QnzXKu9cstOoolVFX4L78iPacHXpaW

kIeyx3E3oyVayGER2iRT65/H7OegZqq77K+Pr1tK/N0V2gcbsF4V3c4/TOpQ3VfFaxEoMc3NahTo0g0e

Hn0Dqvvbmg2zRvXOz3X9Tiam3fUMykksnixAwhqkCD
364S2+rr884Odg8IVRDJ8JZf3vVhP7LnEzUMRT+DqX+RqgwADYqQ0Fs8LekBxjyXw/gvaia3iR5cupRu

l9Kn5k4pAQUGW2kodrZirOdbQK5/6GzHEBQ5+ijPkwT5CMLNKHH2XjpJs/pUTKmruU9RedP2IKrMJ/uL

rJRIOA6vhXCs1dw/zRCSktDVTxR4/0L6fTtNrad801
HsARZinOpZHTU36MxMu4RCiOV2vSaYllLgzli9T9xJGEKxrK5ooHB5bopnjYcnaOe4FrU1PxGGdwH3Sx

chPrZMGiQVIEbkd/a4McxyCHVcM5GPje/XntRS7xeR2c99dhgCL2qmO4/xerrLE7gKz5otW3WLV0j68v

Kilo+MQqgds5W8uxRj8cuV5T8nAwd+EptRmpw5dLQ641
I4P/yZxYwNFNXa0r5DR+Nr3/7oZtNdsjXeUfNSoJL53i3v6y3uLP3I+HiwgxtjsK39o7vk3hfFL28ReL

vXSyE9kymGoMkU4xjCxITvH775TIB0Mr5+McCXO19KuhLiKl/QPPwY7fbOzzq3RflBN4KAmgfY3MRmmc

Ov/KBFn7lKIU/27Ya6tKBzPCWo3j5duwhC2uj/O6h2
uHjAWm/hBOWlLI5Pyt33ZlE7Xn/dZ3keXo+LcrqlRduYAoGmIPtQ7G9XbqWKqTj34JDYTPGJCepAwQ8G

62LA0rJhCLoecct3ykW2awnJ8FnqX6powZ6eGOpdWKKinlLSWF4FnLVxkDPwXcsfYQ2P5yp1txbTxchE

liMa1PKY1A8Ln3jRSkayhkHz6lK9bNRspyqWcclHUA
uAdUdvVhRj/FwGV+75GRTI4o2X5mIkkAOwt8GhlNIptZc2IYbUY5K0eqcGpCiKrFMFNHGf/i3nmVA0/y

SZVvMxH5JsaNg4mW6KSCK9/1jzu7ZCZu+HFAS+e8DZTG7+5lgRGED9j64tN5hCvHmymY76HM9tuvhURe

UCU3NJJUCkEZesbeQnFoi7TavYrjXz3Ndp5KJ5Gkxu
5hdQ+RIsGBw6LcK9GSQ6jDzNO4Jv75lWhbtgu+lanCKpkPYhKZUEs3zBipx+Jyv6s/sni5fecA5bY3SB

n6FicPUPhNCBMePmKdssTS+PWEaQzwzd0CO70eRPqK3S5KzpOYSCs8963sa4uJo+VA8YsavnyA/FbJ3D

sUhoqoKJb4MTW1sXw/6G5epQlGMtl8lwG3YaErOQk8
rUBQgiCYGHwZ5Arqz/Yu76LN1PGP4gV9aj5/iGp4fbk78MWPAsFLUg+ncrMm13KLqeKZhTy8FRbj3pK7

uhA+YeWZnyTOy1U+Li+neQ045gCfGawfiIGbWVkWvcacz3PMLWE2e95UI+BXBiWHmawzxqppjUmoM4m0

TUhGWPG+ftiz7yfi2CEPx+Z6X8qXr63tfrb3T56/wF
+8Lqf8/swk7FXlL+/GekpuxxW6w6pqJpIMCBrQeEiLYFoCmXdGofEAlDaMhvvjOzpbDXVWm0Ub/8oLpw

dxCF1KL4ho6dPcPSNTsM2J0hG9ql/WYoyOeASGbo95+21BaWITCv6te944bQ3kfNfTyVduU1leX3wue7

L0OSiGESSaA4wjL6CkUIRRSxXarniNm+n6sl2RJEIQ
NSAGI2LoPuoUPIiSaY6m+RbEA0XKceXh28kWi544jWIzMvvYCh/8xi5O8LwtKDI0Ral46eiGLqYZ5PSW

qm+cBtMvSvXc1gxjmhnREJfdiQgJ9Uyfph056RlueMY9r5/KSroAHo/S2cMjm4bUMd1rbBqRA+8mVHPX

T6ndlMnds4N/Z5MyJQBGCjL+AjljDCzak14pxIu7/h
Rj5m8Lo5wiFoLAxqz5UtdQqmwgoDW4K9rH2iYC3JeqetY38j21HBIoW3xyCsBz3FpntjSEFO50401Sxh

npxu3dBD75iKUXLkBlaoDxpJUMo/gqr76BJ0XRVDqv9u0TBhbmc2kOCrgR7qTpazi5bOXI4PcJhoWgj/

tNu6k9n5qSzuWLCcKHpJHdlgUflS9Y5KXAYPYuquKu
A3K5oBIrL3I3SiCjGPc0iqwPI313GkHXSMDV1XXryvsWWE6SGncwRhqkCZgN7OAFKuNBY0zDNSRGqAhT

suQ6qSBPLiQYsggqy+Q2HoRHx0m3El9+x9yyDt77VRLVU6cpn3YO65Eui/HoYE5B19fp6IGLxkm/tPFK

n9oh/opBmG7P0F57tT6KWEYvhAYAK1X+tLLMT33XxN
V53Ag6h6HZ9PGQePz6Wa/YdKqqb22maCB716cBIdT6QdZjwXd8ukGuvL7VBXy0OhhH4v1nq3kd0OyOy3

armu067d/CZIz8NcVJXvQlzJs68AF2MFT/aS6SptZe4LzXHkFe4yqvdqjOV2b0VfqxpuH/CKciKSr2/x

jinzFM8JN0mwsR2OM9GF5lyv9H++94hCOr6JDGUoZ+
DPNh9gLqaqBhsGiPIpef+Iv0ZzywvS8tlei4F8XOZVAp63GdukfK/okROWzgZb8OL7DIzCpTd24IqorK

qzL0mFFygZK0iYSRPgMJFe0La/5gs7LxfsHBNChFwyh3x/MGYuMFK/CDS1cHBY3rpzDBebkLYgQxNC4M

xqpTq6NrhDpULkCBUbFEZCLO5Zr+3BbTUy9alLBLvQ
tjOaKZHi++2+cm9TEnNK6bhJx5q/YmgADcC0LCWXQFgJinH1tjqoxLbCR3AQsp+Jf3xBny9oLkIJ3jgM

Ou1n6Hfg2iT2kJyukeVue/XyeHDzG0BpvXhypZVSqrOvjVPJVmEcbNgv5Ta7mnpUDPR1RwfRFCA16gk2

AC/hMby+D1ocxivAQ53LrFzi5cugfiuXI51PrlQoXI
j+GsEFOUhOBiETQ7Y2ec/lunpPMU5oQ4r8Lr6sTB9+u6XY1zs1N7gw3kvFDPlDZaX/McI5f+wRtBnmig

dNBOfcFFDOuEJtosiYxV9Gzf3Ih3m7HXysCGfi5mLBVkD9qX8pktQ/G7GlEtjxwiQMuv5/lF6okObonK

OBmF4Nlsgh5+PFyzdEvXCxeAg8PVsiB4oka9Elzf1P
ba3ZcoXRotoPMIzsTgdI65u3Bu1xA/ee1F5Sjqr/Ue1t4m0vM/vbYP1S9d2c5XFH7MIxdP7yqBJEkke+

5WtHmj8Gum0DHBTwpceJjVew3P5Z4xlmFmowHgjaklI/Y2tM/DO5w11puGZxhFh/CQjcxwiinvKxssBU

wyKCNQW7Kd9oba8vDFRJL/pd0Sw8hf+X9o2vj/l7Hu
3MFi/rxRrAk00e/Px9ep6N+lJ09Ek1OgYZVuRwsr+jeTt7ssyzUun6BI22sbl8XPGGjRpyp2A3jCpTW9

OXOGetzJBH6pqiJ3OAEhhH4bmgwuzhV0r7Pu8FJaq0dHlwKy/4xA3/HIbkDVr6NFzyuC8/HdkNzsjymS

u5tlBGV9x0ABR6TpVAlLJ5nJwJIbTJlGDJwx+dq5yh
jvM0wyU17xRacrjT/Xsf/IB0rm24wfknzWSFs+gANlKW+81cjf0FO09tSVSGc5FKkY9b/dHE0QxYJJbr

luO0K57iE6QMKeYL/CAlxa1wszb0gDaPsfVOPEmdm+sO44VbGnSvEJcEGLcryoAjcy8F2vNX/VZdXCGd

sJAF3NqI8EjekB2I3No5LuTYgjGwRkXxfg2oeTNIQ0
1r6fksAsdhE4DN5AP5RJXNfJGCsfIfIDF84P/DP2IkuzbIsLlFy/+5uLy7cG3P9MhaV4lvErJttRCcwX

rg+jVCgCwiVBRwm1TaTNVlELCxtvFQV35atfsDmi73N+0PsIZOizlsHElbEhu/M/LSYXkkh/QoBA9ICl

AkBqqji4cLI8JQ5s8DqvM5BL6zW8P8LRY30ghbuWWA
Pncn/JmjrGH401rsnR8S86tnZfsbdr0rpxvulfu4Us/QrY1lHdE+sxuh7ijj0GT4r76CE6okXjuRhNVm

XqqvTeIbtBUe02okRnNu4Sw7HW75w6y8KjVoBB3BHdFlTArJ948c1aumUtqq1Q0Q2XEUfb7AmBHqtle1

21ZEjyS/k0P4gpWuh+mn1y/SywplYKNJsHrRTRwNVM
u0p2W/0A7GRBGxTH4/j06+bE3O1TL0Vpvav6NswVAbgzc3rzIMX7uVLuARZblkOVNwccTeSgOxkq8R+E

jvECOZDougvQ5g/w4bjI8GYTh5MDhaoopM80dN21FOgFdbHi3fYaaSpHNbwMyjJddddcZsd3vK93BdMZ

UENJiEP5URtnAQ+8UQrCAR3oH9fb+MJMtJc/5kBKP3
tHG/byj5J6yS7/pjOMndTu61XnR3wbu+yv2w+0Pbb750NOkfYjbMShzTUc+uZ9qg7Ih2DreJVcykm7ww

7Cry3UUyez/AcNeF7+K6V/FuTe5wSQZYKr2uAnaIEKe2fgSlrDsII/WLL3s5sKVPkJrj0MpCTbRwjI6n

NThOy0WVd5utLSOeZiRtYjqG4THw2zS5Tdm0+V39H4
VE6pn4HAduUR8Bz/lVg4D7Kfm3AXPZo0CVy8IWl9td6cmwkw1HK/GWah1uO8jzJdU8JYjamAccmKGv+H

T1IUhiOLPkxg1IUtHUWNCkNR+Lk0O/THFCGAd1wx6+Avammo1B87CI1BSV2A8hXeTlgf5txp1PlcVK8y

TcnBxH9DhTIDK+fG4XsYLibt8FFvl1j1G8p5eK49B7
3TODUFP+6tJFaZdOINqvEcKFaszbBO/OxoywlVV7ccGqR2LsVzbu/0Rn6c/d6ylf/RTlpAxi1ow7ZmCq

d+CvZHcZTmsiYZKb3sv50gjq378FdJPh+RI5mg2DyC4+C41UDomUSVjes03Yq3/PoR2s5D1SOhW6ilhU

DUkbUGjX3HHM9QwgLzUgSge885gvduucNEZHqoHQqg
MsKRoDzWeRXj5h66UVpQiRCx3W8703oQCSNlk5mVw+IrqcRAou0BErIxnr8JZZCU1QfkX5Ec80PWmSek

yewv3OKkjJS3arW2cz7y9Vtof+y2Ry7zK//EQAWTYJNf3FCdNbqmvr/PFkaIORYp5WBjXztbecWRjiR5

T1ibTAoIL9zjDjEl31GVkkugWOVEpfrPn67TFkeHjQ
jCzYa+5memJMUifecB1MvWirE8sRPBqsOVYIzesRcIxT2ieafdwB1A9GPKdZt52DQRggRgyc5t0uO5eC

vCF40n27oKbzejcSKG6I521w+gN6/P7vtXLTwgPcYEjNZkuvYqIZG1x8Fn4NxchEKlRU4UcoETuG4ck4

rFxl+ERIpTU6NvnmF8aP4FLMo+EfmF/opgFHJGc38D
9km/T7Fp/GGZtuZekgOB1Oic21uTm7UO84HJ1gyGPor/wg8fyQNSbRdJEN+XSl+1j6fmgtUahUYDCw1y

QK6F9V/3iiXuvmd2eY0xVHJp5LdwXPbXesoj+wRYTvoxfeoWuSm6kA8p6/WsA719iyMi6h6HfZuN8M0J

UM1R0LUNf/226kxioA7tacvep4OiMfU44j2OmAlY78
fYUo+hgEkGM14xsgH+g4EgmWkKKCa3l8XYran3q4Ind3+3LA5Sns7urnwXkR4kvnPyf4tx8v7usEjvOw

ZND8u7WtibnflMLjpaiq7YsqhraVYbq5fttCYOZlEu7+dAFwOYsWFG+w54EaO0pKLDvdx+p4DD3NuKYA

ULD2utE4/q3SagZYkxbFEwrZK8yK1QSkjtvO8W8uso
UpYHO3pTCEpM2JQmtuu78Zs2AvokjCnwnEd/6ZgVhV5nCzbm44Bi6k0bCsziCi5jsPQWbKHJQcEFlZzg

0qp2BW+TfBd1aAk+8/0no4rYSBmbZ6NENuh2MZUkmFDeC1z/h+FNg8ylwXmWX4Ct8V0IM7RkYYzkIO9S

RYEEs30O4+0PfkR5kHuWs3a1XtFA8bjZp5lniaJk/T
vC2hRKTDHs0w+csKAwjcRMC45acPNbdZnLtXxHA7b77RPDe90u7Jm+gEFjuzd2uoCm8WQiveC8gLvhTk

stkCa0Pp0uhWpBlbLPwuek5Gkyc6/n+jrrpMsN/7/b8T/Luyq0pzja6jBVgNwUPqDTS3kvzCiVB1sMDP

xCNF2X/h1PRs+2cmd8/k0jvtAazaYR0FY/owmI3AZ8
TGTS/j5d9hvWKp2cyBpWqwWzwFGQl+qEcgITHdom3kmvhbdpPX9sPWf5c7Pp5KmC9GaPOD7X6woYNteN

J/Njnr/lZBxx0VKB8iaD46nRcbGIG88M74nne5710QXdBAmt5DYDWQt7Cg7uO6HzDL8xsJCp+M7RdjBY

v0pYkdDvtIzVi5ZWB9mFSVwU+v0tS41SeQQO3wqgsm
h1Em0111XKo/jNFQ2K8Lx9cWXDPcdl/wLWUNflsxHfOHIsqL/K7TjQK7JZ1alEYbQ3eEikqwqGSJtbEl

D5pdroFR+iMhez6UX1ja+66HMIhkX2YMMMBn10uqJ90bfjsdMe4ZzHWqAWIv0oUFJw03Ib0+PaGPiiZL

jZ3HQWKu04jm0BK88y4uu+Mr6yYurwLFEb1hZ0ZBeb
R25uNg6J0SwJOEh2KpK2AdS9FdVvdnSWU68qBlG7INRqZ/8RgVDklUiFNF1ayULc8uv88hc9qwwGf1ed

3DtbYaNk3bVoIQoUdLv/OCC31FgGm0g5u5qz8xmIJ6bGY7HdGbv4pxE88ApBh367dMYtdMNaCHi7QOr4

HBuEfCdYLJI67ajNVuPClSzsZGECzTqVPZj5jt2sXy
QnEifc2uRvyE0qgpCzXQNQYY30FFaD2lFjd+KGZd42zOXVgfpBOtUhhZVHuTVrcFuH0BcHVM51XWuCz9

KztUlJs6S571TpBXQinDZDqROrMaax17XEG6SzGhiSI1l0KgJ5u/R+lZ7EUEjySnEihydrrZue2GQ2wR

CKY+e4ZoCSRVCfr7cF+DC1aQGDsoowMU3okkoY8azZ
t6Nw5CfPbsyzDHpjQQT6cDyBtvBtYe0AyqirldLsvqzVsHs/GDhhHPRpJFpAs5BxeyzVnSHJ4KzF2otG

Rruu0Kmd/mx/JSzA2MuIovepz6+MjvrDQziqFHk5xAJyFrwEjD3F699naQkqRgcj8yDQdyYi+WnsBFXS

loe56IPj9pzF1ucQuvTBXx3EagZyWm/SMiJTChiF9E
p+35YpiLhPax3dMu+GBySLnktvUdZtbYE3Hg1YhIxD5OlCD+F4k6V7QbiKQvNs0TnzTb2mQY/fqUgNBB

UA4nvIgCGhlwek5Qev0rLIeZCcMlbSeBaLCyFOrDEKPtyp8aHSmdFojXrH8L+1u1DlzURdHLSv5A74qe

9wTRxysyO9RHoFts7HXOoY/1fX+r/yHgU5wpPhooed
7lV2ag/SiqPSU84kVmCyruSgBiJ0KkIIN08rfXXzIKHNR+MLtvY826cn8Lo8ijESp+ziiTQWBpzJUq4q

IIjvEcNJbYo8h/LPC6SMTQejbith26QYquWYU8BaJlbL208nULfrsuYhZsZeFZTM0Nh59xiUE5YgyBqq

D3OWAGS3dAzr9UkjjHEG0VaabSNXvwJKwMijfvRMf0
BFxDJi3JpftBzam5Tyx4XDeb/2swbSa8jdMEXVHYBYl5adVNfoyM5Ncad8NgSpPZ6lHdhEOTY7u6zD/a

DUMvdIwD0oz78/iEpKRE6jKjlj3Co2EK3v2pI7pykR6wcw7pt335JfPJuQC6g0PlIMY1olu1q+CTsSK0

9D9AOwyicp/Oi15tNLpvUmWBEO2z8y7fQLSanfdrsi
rhc4PcKvu7H8yz1CLOush/doL/X6ou1sATOyFw1vq82gM3jIOGXizHQKOp38/qsyKrREc/ELms0+9wlt

CTbv3f/C89uai3/tVuymoNmE1WFIhaH//BdNXLTiwxYirXV4ln049m2k0Yue5lhTbrBjPNKl+axCJbOg

MTblmj06jx4JJPpfXDNWTdQfJqQSNg78SGCaHDU35c
geiETfm9baQ3b3uTmPZluk/PHcbI+q++j2EHKKOtOrQy92kLGpE8p06hTbc6w8ycuBRf2LL6wth8Sgaw

3eTK+lmjXccwotjfe1/LWLT8XfgDUrzIzRcGpsyuKlYvK7dgdV30nyzN76BX5U4ZbQTLQ+xCkusNPPCM

9OKVWSWhome+f8G//803j1uQld7IXjC+zSwIfYrIqh
NsTENz7C3nRT+k+rNTlgVzQOHQC+zQp1JU8+cReCx6WhaaAHtYsI4IeqbePqKoR802NwIqNclZuNGErf

jjjSO6HOXDmQn2tYI096KD4SqcZ98GHCon+CnOypDedi1PBMvyVzlfzc+aCX0rlUiUdsekZcukAvZPQn

QSGhN1+otuxHW5j0KjJSzZSPBHNPCaOOhTsJGrWeZg
nv+CkDJ8/sQwWU91nMqieU/yz/yzjhQN416PC+S5GoE76daXCIdIr5pe2SBsgKHvKpORaw2cstA8F+Or

Ro48zWnN0w8iRQsEk2d1zuNGDUl04j192/vM+eDh84PQ6QK7H1mpFEfYUYc5oySi64dfzVOOoxl8tnIv

Isae8wEnryIx7tZuEmq1NUsu44xGRAY2lnViF4T5Dy
the1UrNABkxwftI+OZhuFEBs70RbiJrvgm1NkP4F7O2XZk1yV3iq1PkP/zh462mhancG9GpvEVrAI7/V

+fdhW1HhPadplt5nO3i4pyaRPnvEGsEvf+3pQWQnHO9FndNER9zrDPg1+T/p6HM+zw29iVoFT352x6eN

SBgTaepwBobQb0kFrIvR3wVceSZDGqVWTgHeL9SpX7
LhWwDOU22swghmuJuupe7+90x5n8AtV07Z2r0hMfDJ6Jtg0u8LXMrw7DTYyy0018oTu2QXIX2OBAdQrc

UfsTe3VgutkWrUT8/BAHZImK9jEnxg8yAwAwKWdEfwjrjONIDF32992u/NN4XnvpSNu5fwbD6YLeUpgv

ni7PPIUgz5XsR06gZzcJCX3qbVB6yLIPRWG+QSwaDE
bXLKmDvCTD9Zu2HVjtzodo9Xqq+nwkPl4kIxsXpKjtVIjAq0HqypA3QuyjYAU1To/V+ewFNU7AUgenJq

HRsH0B4XKH2I3wt4xPV/Ev7o42gIVD+xA727n4L1iYG4o2fDXeOFt3eIiUV+5k6w/oZSQFRy1pvqdImf

+vRiYO/1OckyWL/Pv6PI8EJ+C7kCMSxlkYvhHvc1SQ
dMn5iLELGo3vzS6yAtSfj15ld9LhItX52xBslJraqqPT+EygYW5a5ue/M7c5pyrRZcomxK1K4l7BrvSx

k9FqMQ7CZt6tac8xJxH9FmsEkzblj9U2151tcY1ZnO8oVdYRt8vNFkGKo3r4wi9bO/7O7pru+EeDJ80R

yhI2o41UbVEp/7mmjXKs6XwxqC93erXwOefTao7n2N
8XHm+LnXp9nA4epWV0v43iR/ZxAa5LqOZn0H7VcbWi5MWE4aNO9Kdp9R1DXpVPS/tQa/cWxVBGf98f6W

oY72u/HrR2aMwK3IIotJqc/jDV+UzCeJx3Ia939yJ3+KUILdNhOx4CiGTGhTNBdAgctBYBv9MwcopwA/

2MTPYg5JhA5AvijXYUPjOm3cjyFbR4PbVVYz8nz00C
ioLz7JJgXW06HgU07Q3cJ2/HiMZBcs9K6hHB5Vvbp3Hp6DkX5y+3vEThnoq1M6yGBB2Ldv2h80i+Nduq

YIf7yd2f5hRTKwHNaXJIA9mubZuT88PahjPco8WR1CQxVK0baO2P+UybYFRILeEng+7fVia87EN5wD0O

zwjXZbCV4DLr8//UgcYfgoKRG4TIpxqbABEKMoZHG3
kFkyeCjX1FB1J/1Tp7/ACsVuHG7rEw+y5S15gGjGrgCfLcObS+gm6RPZgtAqwkcc1J4mMtslXYGJPAHX

Pca7d/QVjWoOszrq+PlB9JtB2dg/us5V+bdhU1vgZG4or37Bwc+duK7BcBJndL/68IxuDisfiHMknWyn

TLZzIMx++FTbkIbFNrucX4102HMUJ+6wpg1IeNFgSb
57+rNcJUBSGq1cSD4dkQMdhvJiX2WqsGeTR0zfKQwV6cw93PWgD+uQ77FjydWBBosWRQNXai19lV1y9S

TmH5d4+Y3u+ypRqQsP1RmJ5LuAYJyBPwcBLGJwWi0s9sHAepfVNkLSmgns2M3PLNVmFeD/Gu9aW4dtVO

8YmPe7KJiwYaQRkNcmCsYUdZNgdx3cO9ZDMybFXHZr
1IPr9lkDnTK9Zb/YVuktoqVYWmrHrft0oBgee4clpMWU6aGO+azuIuiX4q8vb3z8hJzwanC/cjeBfQOq

fEGLXr+dAPJDqFygJQp+AV6Oq+SYxRBWT16pv+3sTQ8Xf77MyP93YnpXvJyQIxOhg3mhZIRql+6BoJQm

EPOwqf0GrEirC/FvhHeKLln7wWh8vRZQDOSChAvPuK
ebf2ZdvSnugLK57gcDqvat9ncJWG+jiZ79XB5iLNoOj76HsNXsSS5VXEYdR3EVcMR39bpyEQz087zlBU

gQoh5rkN76rAuPhGf+zUScnhIh5jmW7vQu2e/T50+BuOCmMqOUMHoUR/tQ4ShX6TZtD4AS4S4BTkyEY6

aeQhZewI9e44HijEfbn97fLCK3/gfcMFrG6Cp2Dfcq
84jcRKek1PTnUhfNibmD3dezKpThGch4tu+BBcTPwQ18bmDN8x2hBeqQG7aQE2rOqrgFKJz5hZYOpvIO

zrR1jkuJlZcxckd3FMewQWKTVT3Bp6l2+CYonXBw4CD9/Eb22AwOBzQ3UkS++uzNpTgDqPfuxTTQWTbc

X/btB6Th288rw7aHniP4Y9bg15b16L0Z5pvuU2cJ2Q
NMvS6QVCGoJCNXmeL+3pNzuXTEVUamDeMn+059pT8CDP68jE+QQDOa0xixFBiyrzJrfy9vVzNxH+8B9Y

MMx4EhRbAwR28yHmRV5yxyi6agyO7sW08utoDR0cRObjRLON9pcQNtoi+O6oVDs03YxvVAhet8pNR57f

Y6Y8ArQW1g9jgB3NgVDf3ywU5YqHE2grdYARPntrLI
iHOt1HzupIlMaEvZJakiAz0bVwv/Rirmt6eyWNId3ubkYPvRSpQ5wS6jiZh3aislpNmeRrkAJeluhrzJ

KdlfItMKDgvq8mQoJL1HkwYwoih1m4vee9ayJbWSmZsReYsGZedH1ENn7NWPxOWDjd+7dRBl5RnOiuFK

74CRpK28M1aUytiKbZVkpQelqGyIrAi0LHuC7BrHuV
2jIol4z/Khj3d5yewbxqaF3sWWGLWe/7EDDxzyi16Z7U+M9IHq/N4NPmL/9y8j4cpvyZUkkTegwkKyfL

IAwf/GVHJvpcNmsEbNuUMQN8fRws810JAFtTsb4P74bKEJBfnkmZrS51BIEA7qSlL23epMgtMY7Uct3p

crxr+n/NYSLAejoTncNbGOKvYXG/fonbzxgf/jFhk2
dBC0vXHgjjWrdtapI4l/lybzGt2B28HSJQADlbw5CncV3c7gyPt1d9bbFylsGklaOPEcZL9dfpitP7Nn

pno9Er1PsDV7LS7tOpRXAmthUr4PQnWj6K4cg5Ey0Ye6DXUBWviHeilRmGluby3yOqn5jWNecyM4JDlw

4fC78Y04WC+sgSm7aAogiKrhBIMzvDfKdzwMT9QOGB
YzYpgqzJaxa495gr7aETxY8b1f5meAhBjnrAWOfcBvxHOqzAC88GvN2BrFmL2JKvgFj+DtqSopvcdmlN

Oex+FmnFOHh/Mlyd0YPXq2xA7ET2e/XyP9qE8lc4itKbfigJMYVibPIRQIhI7TP1MakFwHw7/eA/gGie

+Dt3rCYo0kAnYixpsoZoiuh2J5MlPEDbbKmgo4Tv2m
AJAfUvu/eVj2VWRFena4pQv1beZfEdKEMcAfK4bvVAKi+86ooxuRYgzH+7Q4WspdVFiFUHHMChNkt0q6

K7k6r1bPTcgBjEc7iD7Gr6PbkCfUpJZs6GaJM5ERwm+zUZwOId5S5v3oAyAfvK7stgk6j+gdQ9FTj5lj

T7NNHLqRDFCBE6rSMyDKSQAGDkOzS1KystY1CMmmuY
KSJCxLWEBykgyLknfJmSWuxMv/QHX93e25eabny34lXmRWLgUxODDg76qDN/eZ0Ij/AonY/soJQ5MCwX

p371+NOZTe2YP4YRS0kZOKB700iiP6bqCVwktQNTKI/wz7cRI+k8ZhvjpYDNdRqj51Z1HncDUptkJfdj

52z5g3ob94fNzGHyFcuJ8pqWWIyi/pZ9S4tpciCDTk
44N9OxuE96aTBs3R//HjCvpRk//SYA7mlK3dQqtePFTs092WfC4JSLBBe6X8viHhTdu1D6vU8WMNFCnc

YPdxwp+m4ItVwkW2xhDIu6AkG/K8kiGsV/rmqbPTcB7vppJqB3fmrjg/b4Mr+aqmRBp0esLKwXL61u//

fa3+h//aHA/cPI3z3KAFxIIZZF+t4vFSIG/QJF6RIU
+gZFb3wvesxbcE6Hpyan4kqwJGoqO+HIorC34hsOwwEK7znIjvWkt1Zn9gw0LSg+1gjU2D3U5tYiB6DS

1ASc3BXdnGz6Qj3+ucSin9bYuNywqz+y6H0DHb9WT+oaB6LQetIvSP6CebYKmuWFVSeUjKN5+PFUwW0d

visu/kKzZk8iVN3MMg9ra3bUIY4RP8uwBn93z8g+6P
B0wgoFMiToi7Xg5YErVmtO7np1rYk75ZxuqFYx8u9hUpHfRXTqiogsaWSiIRpZPoEszH+DdpyDgFsa07

EdwwJUyz7sVtLvsIVcWIfy3NYLH8UsxFb4/eg/3Lxwq2OTSTRIYSdPeTa+Vi3cIj9SQGML9XPRX2wHhR

2zOxsHdGPEKc0hiqNfacx7c56F2UaXgkiuHa0GVd+b
y1MzQnbDdsbz+1HVAlRIfk3gUhjhXYsnz48W0AlbA5ioKBolY1lDFp2r8FZkWIRsWR1Se0P6In4UMP8h

jEerqdWr0An3OT6w5kIuQmc6VOhBwbEtcr7tfOSwiwjsDpLUSgnJkYymWMGlVuNRm521Olsna6xl1SpO

Yr/mXr4q3HvccLNl3TGu/eNBH8wf5ZOF7tR95fWJlR
aHCl0GPBCtVcd+48A01w9qP+cZk13s5TijTrrTsd4mUd+yn//BkOdEs4RMF09/MBvsCL+c1XCeyHbC82

4FWazOm0zL4JGBNOAylC+rfsEOJwgIa26GUp0qHtLOfGErpqYQ4+OhvNML3kplTwj0HbsW68lUwNK4lo

eqod9MzTIhd1+VXP27DTd+a+UpS+Vsyds1d1U04v6K
8ykcGsvNPC/rFA3p+to9u9nAxWrgi7WgfKDXyuxfhAMYC+Q0FfpKf8NfVWwpXJZd0qQ+CrOUus+puJ/K

WkaRTvT8t+vqEIiYHRLdPmEkaNddBG+S1tmqpPi/B+eA91rgGTAeg5GFPcT01zvu7shI19kpjkWi0iKR

MaQj5E9bhPdOu81fftZqjI3Oyxt2DZAzRpSF5roj6+
fxLx7erKv6b/9wMMnEhCxktfCEfvL1B4Ohyn317X5iTei2SFilfV97GfWT8sbUFHbvsJqFuF69oce7vf

JKG5qljs+1cjiXJgjqu4Zhe5mr+k+g/XrwRrVH35Dqh28OnKq9gg7VilRXUw03Dbx022eO8LplEdvyTQ

54offHsK0KJyqTqM8VF+yIt9DnfcLKGeAWzrXP+GOz
vM/q3wooyY2kAaL/rdCaALk5Fb8AQNGtrMT/93KOj27K1qxWti5OSaJkfoHCOz39BvGDBQbvsIp6Pgv4

AM9s1xDQeTPenmWG+xANV371iLMYpDNhIMzkbq0/4/u9X/f8D9RnlxyTtsZpjqU6Xxr4FrDd3i+7uC8l

q+zrgPALIJNeMnpnR3HzVe00mIiVMBWSjIdG0vAwvS
FB+UZKfU4CzFy1mgJVo0C3EbCUuOcE5gHI1ZST8aM+cZv22wpQ8gJvNARhrHq5bnX4Gkw7Ar6vou+tSk

w58XASueY7XCRyRr8NISnFjSwXXvGd7FXI5UJV+ulLoiDzihpmyZcppf7JPs0Fz/Abhishek+B1hyLbaFtIEjG

niOJgHgwSZ0PSvPPdvaOBq0hi3BQJ+SALNP5nxGEFQ
OH2JCMr23lQrQW+iHRql/m22ECUFSgdKTB4Pb6cuOZzT1Tj8NhhnYVKo5cRhnoRaI/ovSyILM5u+5F1p

vHqtZ499R3Ph9taaLaiRTbQybtgKLK7G9nmVUKl2dk4Qt43Vn2ykVVzS9COOIzvNx82jbeuzkB2omhie

EBozXEeP1IHHHzoz43WmWSKrXoBOIyEKD1A9x450+l
1Ew5B6k5IWRJmwHKdJlAq/vtV9H7NEAGBxTktDsi7a+83cy2Vf6Bhc8iereR9l/mfcINbn/FCKO+LF2W

JLzQ68oTknFCBqgwXWd0WoZ/vZKexrc9ue0iKNcxSIMJlO5aElRNltXXW1w/y6HF2IY7zGJW5z9eW5UH

V6TG+nchlLEas37s5M6xDJVOafV2NjkvFRXv46vgSy
VqmFR5zBAt/vDxUMRVC1q6IWr4rOpNtC8jA+D+QEUacTJD5Dap5Dak8SEVuBI/k5uN01EeiBLuc5ee5X

oMFBVAap+e3he1rxvW+frmYVFKX/4F0it++qjmES76aKdl8xXH9wwCO8jFd84Q/mN2w5n8g82BzQmZZv

J9iIkJa1IWMmvP4gd6eIxxxGuBS7dW1tWRxtbRtBWi
OsST7f2cJ2uSXc2sH4N44stkCbXtPCK/IlSWTqO25/UPBHmSUNW8XJilz/UmS6wlteyJo6BPvwq71WIe

i8CnutMhAgRMCvEbe68+6qAHMXSyet+yRlo4U7en4s5SKOfzJ+vh3R7Cqm89YhoZQZ5mYJnGu7boZyxh

B6kdvP+vkfbp/zWkTYB+M6+d8PQPrM+/7tRNwTX3m/
oGmnE9Rm/oUDZmJ+Bt9FFnkMVzpO1Uxzfb5szh5jVVyDXukP8GaiEweHtIhPSh07I8pAzxZ9ACpDP3Ww

lG1idZsDqOXhYO6DRWxbJGB/ZKJxvHlrdhr8XZc7LdZPK+i7KgXmTtbApz/+DOr8ny/P7TeUN+VAmc9I

2KcWT5U4RlT2Umtv7Gxt4aGOqmFVSBqy2IGvis+COd
piwXhRDTLc5xOfNUtPfdRrGG6UrL50g8Jssk2f2iNmdBTxgjtIwttp3uC84z65GG75pvOns9Wdv1IhW5

Cu56kw2oguQbHRV0HUhyuRhuwRMlFycpfeHR8vWuHVSjyJiCtA+8sgXOaGeEKEDdjIUMSzGxJCFW6zgT

Z65KhW8rEegYXWsbQQY3uGdhzWAerHxzvJerXZwqIr
yDhU26YQ3wkUw9DdeGnCdAGvqIAOoBrlXGHPbhaN7zXH1AubsU1UzKRc6bcGf05bKTqHuAFTWJt2YYp8

0FLBhnBWXZb/ZS7JJ6LUnxHGipbMhyJ6q1Za11kqmoqOr1SV+60DagjoDMqdGUI1VprJvXx4tswr1Eav

/NwhNBUvclPkMRtEtNKaCohiRITSqMWIsxul6C0+L8
asPJzik+u6GudYvHiiu2I30kRObVKFzZvw6F+OIazBblIR9u3Jc8Xum0DGFBwxWpnVcK2t6iBVG2uwFM

TP/uEtTLv0616uVtRqCmlXKXlMn42/dxVT14GdUyDefVO/LmLUe9QI4BOZxkldXbh2STJW7q5yzidOTX

IwsNL8WMpSDHTwynmtDF9C3+PjSCgMToOy+gMNbQSp
6qGby78BL33umL9GQ0/8QWsmEClp1cbVOpi0OzPfABGnGnPoKPhteG1nP8Ve5MY3RF6WK59J1hHmaUyw

Bdwa85noBVgDsYPyjl2FjUAuTZg+KqdpwwBfv7vNVQwiiz2TkFBypfGxf6xjIIZ2q2SjtxQ6CnX69wqQ

Xdm3JiLXJFC0NOQ9Kcps1wMG/xaSn1rbOdwhiaHBM3
SgYkaOt8d2DqjLYEF7gdlA04ZstN2k3bV/mlJGZ/Km9rSW4IoK2iO/om+NCgdZyGFuVnSfyn105vSrCk

+g7c5TuZRcendciBlC1R0cSgNVDQ59Q1XdRfMEQ9mtRaOWM3VYytPMJ7sp37z+kBlZRWEygNjCXuNmko

2vGS8poiNYtIqg2UZj//w08bxw2JjzK5Vassp3+ml1
B5P777/8fcFG2ak26I+wGYYbsmpKib71QgvmLQTnEAsUA85cWImFO+Oe9EwlbVnzymaLVAsm6We59leL

/RHr/SKNhkNFHDzCp/+UD5d3Iw4qvQcSWdqozHl6l5pHJ671Md3RVRyjvYxZ0m7m08S/5qv/Cd1R5EL9

zUNzK+0Me+a6gnaPxfm8UJM3/4OHBRO1vUb84efcA0
DmSnk7zuxHIrxrwpNwR+hrFxrJLVvVZi+dqx7Sz0+iFUxmpvFUjjy1WP/V0yVJcBs5s0dlKtPHW9AAoj

DoApkr1iVNLgtcvYSkEsZzp/jWxfB7O310bV0hRAGfohCxCQDIEaP7kgjppHmnPHhRBtrbGz+AslnBm4

fOzNcWUgmmFNX5BQ5QuYzsGIMkMYD/YPROzqetqIev
qhSgvdlCv9xaGjOoEDparchuJKEjr8L518/MGxiLaaO8Hx+8kE4dHoc9j32lyOO0CinJATl2yqfUDffF

Qn0tcBUBImf3pqbWroalw0FZ0TQbYVKU6YYQHOOOQLi/+mGtOMsUCRPQIieTCKCH51xFn3JH8jta9beu

KIhP6Mxdjyur38ngor7WIUSdDRJQH5/pul1U8YxWBk
qLRpzToUPJ4eUqNjvzAnorumP9huQc1hQYAIMCrGGOIrXojULTwPazCExZXmq20qK4Ni30akhY1yi//b

bOUt6E7UxNUJoJ7IfEz98Nr9cfo3yHC8tmBhHQkBD/F+CeKM1dtle33FtzMQYLBgEy7a/2myuDVbXEoq

Ya8zBfs0hRAye4gwiIEbC/TjJeiI2pPnlKlhNgFXj/
gXdPb368ourOeOn163QqDM5VxsNGJPfdqKcRKEyebIA+lR9BfmE06Gm57+Tb1JEkMLXaN+BUDrcZDjZt

YZF330sV6jkqM+H825I9U0FIeSdn9p5pd1jkR+sH1qkDsKhY9hz60K9VQXtxZpavskzB2qqe6g/q4iwE

XDdQJ88DQZpQt2BhNE9fDVgXFmmTP7GoQhBhh19B1U
rT1u1IC32BNo1LUezUMf4b6tthz+AL3Gp5ZJ6+DEX2YcLdejrnVprKMg0vSCnuyEACrt8/iyNOGLSfBz

iXwrH3mDMMLMqe84c1MWFQL3egd9OD2dCOxJ4qZgHLHHcuvoWC/bnWDlON62VhhR84utlWe2dITlC3Pz

Rd+8D1bJLT5HtQDNeOqGp1VvfMArKL3MrnsLfeUjjS
65DEYl8va3Z9aOjG2gMwV46rvwEg6/3+RL6Ou4yVBJiAcWP+HVJpsTP/Ma2vsp9ZolCmGkZKcyqiiK1G

DC5hUGz65zjvXoQO/igJc1dKlu9NSA9DGz0aUHQD+r2aZbPYsGZYIxTrC1OiYTkKEtpf6RZPq1+i7LgY

OTUFqxuZaKt64XTD9mq//2Whbig7wwz4ttptd89yRb
oRQFlP4O5pYbqROMF1V0//Fd12GQoCIT6/WBCmiXqJMF+gaip9JtpzfHJude2jYbPyo2R+8wdSU1ZFIB

KJ18uDp+NxqGr+PhRujXfHv5n9y6ipjQreCDqi2W3O/SfvTsFYsxZQEB0jHypXpyNwnPvvLpjECozU2/

45KAaaZvM9r9UAH9pUVxn10Gix2ZjVPz5eDcPET5/B
tiA9SxYT3ST/MuNdVYtDNR1h9C567mV3hAneVlTi8qqgx1GZF5A3FxTYxK1yh6RXHHEjZpmuxItc5aAD

7QCOfpwiPdCgkmMbTIfXBFxVp+RlOGndfgNRUg2cm767MjqpArAO9KEo9iZYxNa2R7YzGC4nY0Dh8LAw

tN6jcjDa155RS96ejLLqqzoKz73AJPxWmuugm/EEG8
4P3QovwA2avMMWItX8UR8uvTt4E0jA3mRKnBN8MGFvddkZIWiD+XdI7e/68rOfv9+mKB10Rs7h2qEBo9

0RLAOCFf8IPsqz5HXD7uLYFiY8uqKDWUx9Ntk/mO5kudI+WQjK3xOXMo0yyrRSr+u4lN54pLZOFQS0Jp

x8T29+RGmzio2L078Ifxb+ezsTwOP+u7e5O2KLFDeW
TmmLwGavtQHP6Uoc5stYcB/1z0erGOfkQfybnK1XFgjIBsKLEWrrEtk6qOFM8ooKoWgxkvbYxCtVC/lE

Shz02Nfml2UqiOT39/IWB8Iebq6/J4jbxFgzq5jj8oIwbTv3ffvIhMgH73TJszIiN6PHuG+tW01adEtG

XX1Kqh2uqIxf3c4cKnlWkzgvkWkhXR5B/F7jQ60ytL
4lNVzAW822dWOr2rQNAD9vsdJNEQ4OXs+qvfdconS87/D7BIq25dqcMhD0lB7LhkZwMPYtMfy8mDJtKu

JY7Ga8pvbbijwrYpNyg8DGuxX4XkLlEIjL4m7DCwBVKjhjxmTvkziLWyZhkwhIfRtPszDvkxyMjtLuBk

qqI8KtcElABRidTHMeu+p7NochAZrnhBipD5jTVVba
SOGWRExz7NeI2Q3w4GG76kH5Uj3vGXN+kdFR4LqJ8dsNeCDnPeXrPy9EO9hqaA4O4/YcDW3SAscpYsZa

UrQ+1KgWneT1AL0CiJvyZBypN7PLXTeIDkR4uyi0+XhmAUa47/+axSrmyly/chx/zDl8iwlIgGmEWzmF

Q53ocg+cFqcljZjVEUA4Fxcm2b+qWOFJqXZEtvyIQS
2OZ62Cqkt02fKZ7iis0nsB8Gyk2emAmInNUwcMXNdE8nG9NlCHJP3mBLnO68r//KIb/JoCJ+Yuv7BsKO

zD3utGghZiaBBGMKqyoLY83tCWXfZYzqAgQ9GgXHNem1lCMMisT5gRHQDtl1KVVmvD7JKaTnvcRqjrxj

kJTgVlBizCa4Lff530csZjnTxiWEFnb0ZwImbFV4qn
iTrB5D3wgxZ6ELQYgLMFzPoRvomdz0M4q5EQOdU0hwc/KMx5UaurR8465sn1Rd6ChJiqlhgWkl+SsF1j

pPb9+NBcmXyX/swIvt7iPX6dn2F1DtXbS4zjGiSiuFAQU7qg8FNw02R2z+EyaIRMSGx3cpGZz34c7xf9

aC4+Dpx7U5+sESB5ulT/fQufDeODre817Ih1X65Umj
MJxgpk17J6836euL8kvZUnZiWrDvFR8MsYX4MDi4ARSEzC4rpRlcL99bmA721OlwQr0N2P/CTfIP+AQb

7tOS6YUyakHgVbzU2410j19/jnsY/CZO6NyQbKKVwhEbCs5a9FO0FNPK8kVtIb8twk3aRgQvP4LXD4Ro

qlbWovCyvXGrURZd+KnWkCK+ROXhrzXm5LwFRbZ8gR
MdSGwnUcsRIw1MdgzPI4zv7J3phRrsQVAn6P3ca+YEU8bns0DdWlJM/ca48/LjpUueBZv3/KqY1aenV7

mChU1BwHqU+BEuHnzFMYOgWnN+woR9eEJ1oiRK3vwJaulIUOHUWIr1GJRwqEpw8XGaZAIYq6U8cDURT6

4HBcY+wdsXRaA1kcOFrgZ2MtjLqkhqQm/yrlRxoEu+
UDGUKcje4vTuS4et5+dAOVT2p4rbQglfXFvQnwzNnqMc4dJqWqVraVocq1I59QajXXb8XDp+2UuXRvO1

m2XoGLqx/28ykw1kSXY98Jls295rvcOOQomOy9uvMb+iA8vS7RL7MbD7mrli/ekLwvrn4BaFIegbvZoD

wpOBlgrSpe4BH9xciGCoPQrhCqWyqzXTtJFScvAzTf
eF8rjRzEChYOlVXa8psSFgx0gBPXdmKAjv5tuitxatdbPbOeWS0ODqZG2P5QcrcEn5rxo35G/0H51xt8

5j/of/EnvdxtR5yxlWBaIsesgmwi4/T7r0IQmmDeKts6D+hscElaawvQ9OJQamkXCo3e921PfDQQyS/p

Hv8pRSPkjEyl6Pj29TowMYY9DS2D8dCT44r3TIxXMK
8JuDjv9G3pywBe+/HcvQ6cu0fPceG5PDyVhBuBvFVn0tWznN7hk0A2AHFlUqLaQS2S3rMth9S3UF9Y8B

6zjXm47CTku7f2vN0cozwe5AUDHrzJXDxwEfjrgUp/gXWiMGuo0LoYcbWQNEHmn1J+EpfjJNmvFnWpS8

jLTQmJHX8ZM6NKRN2xYh+58rF50VdlZgRY/pMtsloc
BhS1mzyKZEmOhG84TlsoZuGrx0+VQ7fS4O7w35AAYHFuRgnemwlRcp10cMnTiOhhVxOwsLuNlbff+Ljq

MHmloGBfnkgie7Jao27HQnmK2wk4O6SoPJMg70ZQHV3jxiZGK2ahbaraTE6cCf4jv7SFjGAV9jircxDn

xsoiuJ+rszzKfhPP2/Z2CmWCmj1FjssDpi5mfF62jr
l22NVYvRuQgkPK2IGMbsEOTRFrYEsEXLV6WXU8TetRbI6KXXrLkpu1b6Lsyto4BwNMTaLmN06/3eJ+dg

SXWpVHsK2GW7Hbz2N98fJaHHG9wfg4+NTYvg3x8UNdnF9HC7w3ajEgVe2pXSlcZgBYwobbcpAlV79izx

mNnlrUnfm8ZcgMQk2VHMru82kjDpnTPrTcJaORXyvl
N4RD53DFkhsorwr/7rsIupsh7l82eUTNURyjKdk517oqT8fU0Q8MrIlEV4CvbGngcdyCcjPqVDWtGI0N

Fl7qiFRANpN6a58f7QthTBGYYJIKUnZn2BICtjXtSnuCpHJ57+Vv0ImMoqmMgHYj533z8Y7mAjTMG8iC

AsTG9SxHWuihDlrgprixdVogVjsaUnYIalZVdNfqZ4
Lh2laeA93169LMC1mfxSVR7UHOFku+HiAqxcVVu7+q4rb6I17KXhlcZgOfcIEJgnEICNndifoP6BlseY

FMN0ohHvn6qe13yhww1zYBzc59U7rCd0cDhIGaJjg9r7bmmejAFXrXcKN+LGD3GCIuzJFzDEHV81MCf0

ev+1pV4PhbDxH649sNJqj59u+T3un9slSAVokkOe+c
mL//Heln/8A67XbJPZbyZjG5mEipaH8RrWeh+ZBuENqA2CZskDZwaniV8NjgVOWcK3iHPhQTf6DNaWXN

cE1IVcAE5+n+S7REq2effj9FuAkWUJcbOtVdV6IciE417jS3PM/R32Vr1maKpmjJSg0ySp5wOWn2zuwf

uz8vBjgzGnq1i9bmwvdo56MszxbK7Xw9gdWMz9sI6k
j9PEq30PTEkb7/zJyYpPWd9rcnAHHzYcekGdZ+Tr8uwwAN99yhSKwganRS+A9CuKkNS3SX9W/soS0jp7

O53mgZopuogV8bQnJAiEpiR9pPiT7oGWQ1EfX+yd6leyEB/EXUmPsP2/7Prw+GSpDq45fq6iwhzKy2np

NEtFHyz2xGbgbILtbDpoIm6lKkpVbPO6aJOzH0Jklh
NUxFAA5aRhgW0iZs/LTYa57AljEVLGR9bbktlEPh7tSBKonK63F2lI2MRkTcSJJL13vJWHaPHXdM5rQb

g36Y83j0LjvLDMxPhKCytdbg6o41zFXTEJDyuxLCgBwwsTUqn3plAKYU9QrJsa3voXzC5SqzEPKhje9K

0ekQWAosedVxTPglyFUwxgZcrQQ2NmVQsJxK2wKpfg
ySs2+yIswxW/TEsCQ6IDteKwF506vdfGTcuuRntz/uCeAqrQg+Z2nhzQcKhZL5uiHlA0hIb5qvQTHbqY

pLs7RROms6XSD87igKcg15T/CttVwZEEbHKvFQEsIKJOLEcc6vS5KJmcmSpI3UhjJegd6xHkPSvI9W+/

Jny+eBDyb0hu7//sKuk5K1unAwgFo41/qZN/Fuj9wz
/8wz/8wz/8wz/8wz/8wz/487TO4Y8LeaFT4ca7K98q8blVg4vRpzjFBuUY+feikTCShn0s1bVM3RBQyA

s9FmERyt5Gt4StEAAH8LwHUhh6X2lMnHYlD1d1q8+UbEdvpXOvDbXhR8KE5RvJaPsJ0ucpYnvouwuDWe

FCI/eaR4kiVT4Xz4FRTnsyRgeage4QgMaK/NacuQUo
3TfeTUYlxxph8y2kglJAhYXAF2/A8fjqP4Tb8X3xIPuKSEiwoiX9SuPQ6SzE8XDGO1H+I3viIxQTdRVr

BNcFIkloXCzIONwdpuuMhshS1EJDIOIIiG/zjEG1u+TXgtFrv/2FUv/x6CVSFv+D2/cVbwGFOqm/we71

sK/QKKM1whotwcHDxYcKltdJuKtR1tyqDVdLt+IEpX
3MiARQ+i/tw1ciJ55OKssyaHWH1YTXr0S9hz63L+XbMk6FrGgiwWkhcq6UP29FY6Zf6bowVavKjZ5Chg

3PIHJaGU00ZGyvTMeS0PG5mUYRf46JtQ21Bn3v8CPdnaPzD5piJhxOz6LyMzdQxiVRjTh7hKr/B8AJKW

TzWGUmeEsnOx1+GM/CILLwJ3Ydqwl2yu2I9gNWK9KR
uJdpwB7XjI4Y+oGcZjLFETEWJ8rB/ViLRZLWXP67ukP+S8Oob3LAqpg5zppcafWXSPT2e3f7YVuJ2gg6

DbaaP5qLWFjGQ4IA3azf+MTi5tDQ1OxeYu3+/vrf1uvHv/4H6kHxtxomZznHk+SIeFsfxeCnV28Ii5Hc

95Qrte7WArSgL0xlJnNb6htQVZKF9NjNJoGkUtU771
sras7mpJgUhBG/g58fnz1rDU4jsPw/EipINj3IH/oqOu4e9uJIzXvyyn29zqxdYx/dzclDEbq+aPMHB3

qCyXBMj1yu6EN09BtexgBceWLNNEIDbWD4PBAefx1mHbk8NzPk+6H03t0zJwzTBjXf0vA9IBCLWcAkm1

JEechxceXSa9Dtp4/xOck+7SkZKoN/Yc6KjNTVmbtz
xRDxQwj+v2d8xGGWZfLiAgvZY820J1K6rRRYXQ16ztP1M2HnqxVpU5a3P15cM9anIyipljtc8EOqWfWt

A9brIkhEiuebaxYS3qiEDlVFQO5cGk5HyUbIkiAGu4roA1NWjeMFDcW2V6ohlAWf2oSSqcxeC17ZB9u8

QpgRZKc2m1oQvI8rdtm2X+snTu0X3oC1tj1595AuMm
7n/q7eCWHb22hSiTgkZa4ULuGN85NH4CoALj7Ch8qPsVc6aa5rNq4P/04cm/Dk8qcoDk/R1rUZ4SPUZs

P07nH1GDWiceP0OcUQVW//xPwDFCqlTpJ7tSj4gR/650tV+k/2P2RIZDL1baHUB0pSPqoOIlP3c2O9OJ

C8Z5wT3NaUp8lJX4NOOx/dWrUTWO1tw3+46W1lJVZo
tfd5aNNc8F2HpcDtNmQ5eQL3xYMuKJCVUeYLBBRfoW6528KNr0q3zxvMwz2iGKFRUZnXY7pitQxhI3u7

UgcRIX2D86XvAo9Rj3nwNh7M2Hc0iTZ6jFD8dCuYP8ipEM8EGAG9+fNaX3r6/+q+2G+EWbkt2pkC256m

TPcKp28lUp9ux99u3umNIpmN1M1k4SpWS4vnXGqWuB
RlTIQYMirpVMgUx5haEexB3azQrgT/T3ZM/pOlhKL9dcUWfe1y9yNH2xvhv1IXkcnOY08akZAEHE78yg

tCRwXSkvHLO46u7B9mbi7mVkkgu5PWZXKVPhIxUCEcSZGwAZ+KJ3jx2Z9LRb5Gn/uu4sGPaNSCgywIBP

ACb2udf5gcz72Nk1PRqIcT8hE0VELGAufxUiKCVzvu
B+R3hhB+8wvMSGCufhbSlPmz/FtOQTQ/bn9VqlaE8Z9XUG4JqdW8m7hz8pFggCWzv2ET5xRoIqC0kXFm

W+sPwVMvqnmbl5cgJoWm5OuS6sXGdH0R8ayWdSIo0OC//bI82b3L9rFxE07kzShH7DSOs2xSiJY4U6Ok

hTV/tkNfi5F9wmIymIfg1G8IvZXryMbuBxk0+pVylU
oaZgvX9m9FE6mBLGf7VWrRHIH3/zYPcMFzHJPXmvLupFp1a+yKV/Ytfk4Wu7zg6JJVSOQRj3gCK0r1DK

ylaX2kNO2ZFKl2cn3NHYgAnNXcqMn2t4Ey3XeC+nZEsI0jdK7fL6bZeok/CPt0WWu7auCctIqP8wBxmc

GZk/hHdpgCl+SvWj9TNdFCiUTzx9cc04Z44xuNCUUM
jrMYkREcJ24b9J6RA6DG2hqdUBEOZlju7M7+qGAH4C2tlYElU/bbZZTjl75ZuGe6M7cnL5Y+5lAebDvv

E48ZgLZsBfKl1uNVE2+6J5h9GQ7aT1ExWYUZpnUaLW0x4j11FVzSdCnlRlRlztcp2fclzSKNKBfh1ZK5

r37hgcBscSbS+NODQqO3AldX0O3nDuNE8EpMypz0e4
nwpKSl67pX2t4sdOf6WP4scJ2ywbq6v33pNLxA/JvomozLx5Oln9+OKJtsuif3+Ro3FWaztM8ctUIT+b

Fysr28HrAC/zCAm5u43O19R4loS3ODEwoK3cHQY7yxi6TiTDgX316UZGk+TAhv0BrJN3iG1kIOdvXqab

IkUNzkqk5ydiPPpXMaaKVXMoXIgjHxEIKci480Tb0i
EkMVVup3fTyUx/1hiKq0j4lH76ejnS5Mtkh9mP7SbNkqf/5IoBPEqfL+GbxMfdXYZH+4RwXYrh7Ta8ER

f3NYvNP8fv7xt6B+BSyrBWNEFHp+XYa2lzLsIzFoOVqRSrC5ZPa/AFElPftL/At+KEqA7mBvRgDvdZoq

j1fwni7i3WR5m45i+gZhDlwz3sn1HZDepy90xNJvZo
vFIjlOOtV2SygFou3niAYlry03Y57SeJd/bUr7bOXhmZyEv3F3h9N3sH3pbqdOf2lsZaHWKrKRHw3tKU

L9adIOA40E1JeThj2CTv7abJg9onNEB9hUS96+zvIlhTUwqvS418FhIHKn9CUwPeQsL1ez/XwaPUS/Hi

JzWmb72i+48pfClG+H2Wm/Q1vJ+Djwdd5I31XLyZS8
1QQnXvIWYJJparDJony/reP9LINH50UrpeJUifgCqPbRMlFaWzgkzcb4Azx/1Rfwy09DXZwvEPJTfBZg

FJBQJAt5p8nR0EZSDXY4UG7F+lBeGPL6tV3a6HrJKEgnVmtX9X52tib3Y0GyP/niry0dczMGL6cfCtnv

A2x4PBneFkAXplDm43Oknw1x3aTBjaa14/TmSGQ4Oc
fh8UCf0I6dTPdk58kbe1wCaZBU1Kk0L/q5SKgxx6GjEpBR4168bHmKPdthZGH279Pl1N1335A/47khH8

7x4iMInN4II0+ObyK/1MJ6Khwwx69gXazX00kAkD1utjmFuuEmqaxMnkzke9+/4Gsc3ziqbohvn219A7

Ap08D4ja5ZI7xv+sAh8+uki+aseJpz5O0G+rNsnN+z
C+E0mKBMx4vanar4LFhF5ml+BN8SQCjRVvYfOKiD3nBbzSKVTlv0BsQjwbECZ+VmhZxH5WxIe6a5HdWV

gzSklr+2jWcdSi4TQ0fVLYEds4l0Z7bGrxwRaHXKXll4LDB1FS7BhtzMa89ATbHXdUCKrIGH6Q6BABdq

OuRNjkYMpoSMM8mPDuaYNzjA8beqARVhR1VPnGdnzD
qrRdF5y8cENAW7jhGZU/ggfS+u3IX05XQyyi5bi9JFmM5f8ZOF/FtkJ3jx2/iptm0UXocC6p47Z2l2Y8

oumSqyGODkvLNIP0DcQr85F//vfUkaTZFug1rWtK8eVH5O0EjRLO5QNy76j2XQLGFwEp3ZgGPoT31Mnz

ciQY9ghp/DeTUlC+kd8AH0FObxUbkAFJ1hgtEQzRNW
L3DgqoffJFGzS1V9qOsHDAuIdtl3WifK/5zLVclIJQU6O5VPmu8DtM81mH82VT2PeJlgGuawN2ERxRb2

QY8whYvaBP+6rgSFJRQY9//2ZcuIV6HR4xYZRXV5YHmapf+Hw9hWds9ijDrbI7pBc9H5izaeY6vx/uSI

OEU4toXrN4MNsDf/pqizYs4PUY7XjGV1L82UHKTp6V
VLPYskpJSFFE9UgqBA5W5PTusma5Y5kzRoTqr50+zPRMvBfuOBp5+YNfPYdj87OyGp9WbyY5keDkYgsA

rlLyNSBdl1/uxj7T43MP3N/t56TORTp2JqBGCbsoFEqkuBaQiEuJ1zhm8feqTu+5TBZAQQCu/Et4UrPm

rnbOFtazJbD2KbwKJh8IzJXwITUfcSAlFbCtdnhG6u
AVYYjgYo2+TpV4/ILGTlnYjV8T/wv6y3B6rq4vfJGyoUlqTqn6UvBzc90IdQ+BgBTmRurCoxsEvIS4qf

gQD24dES1fxvnmsCO7/T9o498CbrP555XGWGA9zbW4H14a69Mkk1YB792j6oS8Z5JX4l3j9inpOORnvQ

Cdi6UNvyrYzU3se/EtuUQdyqpGsC7e6gLQ2WMwpPXt
5IFkK+66B8syowzmibyrKcF9+7EktMF4QoTUYBMHJvU5TAMsqE0apumVrgLd+Zixl+7j4p+2xDcTJCm4

P/w2RL51H2dNem7+2fhYwC5dDDpu8nc//jQ1r5IbR9djtOITa1B+kRPb3QyZSpV8VvEmVP6neYqyiCuO

VB8Y3bgqj3zyHDIypS7ToDtgkurcpDZVfAMUhLvCET
JhmZ0v5mFKWT+uD3SkiK/bfAuszlAHl/u6OdRROrneq/smGFfELZnSME1PzXmnqom2R2BKuLUk6DlWzU

+ODQ6/D9yNyTWK9X7T9y1273K7Eg6reparpmbOKcxDsQOLEFUaiuCG6P4XQ/cT/bdZEp+03XY+CRLrDw

GqPsIyJARLRUTUmGeWwSz1sZtlrfCPjtmgGtYqXQyE
1mKZ7ZG19EPwtQpXxg8XKQqBtKuOBfn9faxAjZztwxOH4YY/4VvRPDBG8CMwNprP/V+2JbbMhlD2mqxL

HR+plkTn/0V4yh6zqrU/l7KN/h+SfWV+On/uoD7Aw+6LMx4EVz5v6UujNQDY+fbiaW8J5Emfrd+YQzg2

RASq99eCuxoV/SeLnKJrt09miZ2TTftin6rAC8JfvA
4frLL/DLkLJCWPKkMqrDh2d4N74SR28zRuSr/bawkT+xDEmq2zmw41IG5qIPIsoWFsZ3nOqBA4tZDm7D

sZlHhzJt02dseEyn8vfltCOAoTOTIh2hXycwr6xf/QrFDzbBNdKE63LFvfy0TKhe3gASx15JMmSOcIkR

JydpLUX4VGwpPGVMOWunCOLsGdlVDBYC+TMxe240A5
iFX3xH0553sWVX7MdoLzYPkpHcLplu24HZ4MVZNhZTD3BIAgBE19kFV7NA3RL7gPOnglZGJN+hslkkvr

V3Xag1RXBHMgwz1tY+BlfPvvX0/hbqzVx5rPwYOej7Zk0BFuLaa+XDxfiZmd1T6nNjtQrwiUPaqjhrWP

OiAsVmoba2o+iVygm4XMqMb2WaRj6lY0bh8nkxjJlZ
PR3xWEVUDGpLBVIPnGfEWZwSMzS/APD0eh9CDLGpVWx3kVYXAIpe/CFtiTsI3hjL0h1v7k+Ci3d3Lgkd

NN9AO1ScXGg/upzW4Pq08QM2S3yL16yNDJ2J78/NqUOvDhd3seRLrzFbcgLjfrBgDET29DRV9K4bxmR/

4aGU8Vq6vpSpNtSw0PsfIvib7TYRlalPJRqxWJp3uo
3Swvqq6i4fFlZ1h3JvOmuhrlPBYNyHMWqUGy0u8A4t2Q4HcCO7GzmcIQX5Iv26qZb5L7Xl7thbH/7R0O

WHjb1x0iozYczAUgIcaBdogxV5BSG37eutAykdrgl2mbVdDLlEL1W1zkSDR7B2ba/qk1SWCDrC+cegpw

4a317LSfihNPBQRLAceBkDz0uyX15e54Sjl/glkHpP
LOyiWy64W0KFGaMcR+cg75qPtdniJ3/iGj/0e1lWXvlWUDdWmkOgLC+WPhb28Gjf0rzKUWd5kBcnT7wl

0XRBw/eaZaS18i7UJ9ccz8WMs4W4hj3tFXt3nbMPbWb9IT6oE2q13ENYKwhY/qu63hEgdbDAGeT2oM8i

RmCslZRAmE78R1kj0psEJTCg45Duxt7IlmkasNa0LD
u04ef5jZzXDv0iiI31IbILBt8dThZvSPQz/nApnH5GXiaei+BVMjsKmlMVMH/6A4+gYpfQPzVP0g+DwR

N5Ms9tgFcpba8ieBHUZ8Vdk/v01HfyTZ7E1XzH5+0D+a5VnAccB0rRUJ6v5+ROlpOYiJe7zNx6/76FEk

xCs23yzuTIprQLM1bXk/rdd7+iAgxtb59iEpGyl55s
FyzjRtv8gkx4zu2X98INstzy4/hb5Iub7K4VgUQ6ryT4+dYi9EWXffGqwSsCW7ZhYC2Q6i0fM5fwwIll

Tot5hnHpde9q5MImkJ7CP8CXJGFjo8EO4bn6MmVjrHX5iidYMjMyjA1HeeyYSN6Rg+1/4TAwTes1HZ6U

OZEVhOslCs854GKdp1I6x89A4GSCMRwGzbOxBzghcE
GJQi3A8ZxWhh6w75kiIHGkI7N1uYxHFb5QzqgJT1hrjG9oi8s+lpeFUanf8BMbgKfi5bs2C2ejhEtK0o

45aVa4JtSFTWcKdrJDvyblAfIQZo14ofIOby5Rn6nwAttvN4D4e/Kwj4j1cLQ1RS5JeygLxpSnYYkiH+

HBoxo83SFP0HT5LC+3QjSoOjQNcpggxEBpdMzD9AUp
kJQvdyap+DWyCr9MOnwvOjDkhD6U8IbNL0J05f3H6Z8je8NbeX2kb+gc74t8LE27vU6iZBwX6weAx3bH

SmmlsfYtzK213YIbxRXWWj4U4jwpj7oZxOQoRcBNXj3hfJVKdyPOdYeZ75bI2N1Ow5/ITkw8vxJcZlez

ybTyMoCKHvac/fCINffqBR+q/C33iWN88w8+axP4zJ
zdrQJKpfoNxDobadIyFY6mQxQZIjg6tvY9p6VR7DuyxVVKHr6+9crlBTRhqXFjAaDYxG83rqTkbmBl15

war315K6W1ZuEysIKpf63fEicWqGDG3zgJyFeeVsbYjJpjhEMNLyRPbcwUx0zSFTHTSkvhDcOQZeUXcJ

WtGkqnCPZcPNxvWClAS2DEIoxZdE3L0KdLaWRXhsFe
ZKrrsOx3lGLY4BEIUxQxKOOjRCYgzOBsWhUi3LcT+CufUdDS+sxDz0rVEtM4AY3FvdXytkQxw+jG+KkU

oOou7zbdIx0v2xFYeCfNKYivBjOoVHbSUnJVYtHjJ6TfOyIWMGSv8JSsCFaXBE4Ae2UpeymkdAtcc8ct

7FTc7DJKrLo+dHywNopFCyLGu2uQJnWBZv9wDkmst9
12/ormVaXTnjrJAp+1piwu/czA329TfFtX0U6t2zv1p38o1inhTdxWmBS/DGREPjlMprMCnQlMnGI2Oz

5Wrjjv1//F0z+UajcvJlBr99QHDA+XkAYKrYCIJDJhZ/Mt5nCS1hVsek3BfoHu0flUB0IYrKJJi3/+vo

bebRiSVu82hHfQUnGh+5wuPrv6PwxEkj92nevQWbdD
M553BxByjR6/HF6ZhgDpY511JfK0KRmyIZb6adElkydexKRi5i2gypcufp2ckNjbPmULZ1/vcjkvSSRB

KftErgZg+YoJI6U+l2GGCTdcMBYirGnhelWKWghUAKx/2p6ZohDuO85PuFuwa1Q0tvc+CINZxTja+Hsj

g9/ndzApfwwGqrDSnVU7NpwhtP2PhashFST/Levkyq
M3o1A4H31OTS0xDJkk8K6w9vkD050VPIOs74VX1q24WD8LNkPi8k6LibqNjM8jVVjRe2f1LWgSl6kIBW

09oBzWUE2/ZV1m2iBIvtpn5TsTus52UwAl3x1DmCNjREB/o2MkLH3HspQo4yG1ik6LvudqDMxnzzOwpt

0WrIdrdD16ZhEcjUPCoRYto8F5q5xBqJOJxVnXqxAO
+0KNtJJ2OmbSg/12tgq6011Tpn72VYzsR70185QqhjXBRK5qj1hWSW4DSBWRs/zGecqzuGu5Av3HzTfO

OFS+uJEa2Xrt3P9GwdpfUciJB/6lywtCrxhcYk1xeyofWAT+wEfXR/kcfMtCGAsWDQX1k5tnkg2LDZxI

1zf6UCeMLTkDVomJzmuD8V548FDOT3+HybPhbet5yn
MMksBWa14NHZNIyRW64GwRci0lmQ2pFybDimV6H1xMPHLplV9ZyQEjW/pwEFVRUkfS7t+Oda6DIOYyzV

9MLUz/hJjaXUpYSkHg9YuQE1rdJ9fKFCGMUc720o2B0cS8Af8laAwGw0Bg/7BoHPVbvuGKaphAyKDdsj

jzTYz5Otl8N3XgLN+lozBi8FIB+eFol6akRhE2e62z
kQ+nDDDlG5x1L4NIriO3fhzc1nfDULm9Foux147QhT7/tkm0ZgfblBPuT0nQhAeP1kmkVKuw4JXuBzqd

nqzxvMoeNW/gPexSQA4Maco0KGfL6s5SB6Zc4I/N6VrlnZLJGdjG44rFZrt5/HZdRlNdS/Stp22N2BeE

J+jY6mO65ZRYb09jVSi1ei/i/Llo4pR29m76pu1l6R
WS5c5xTAbRfN22JCraujls4fe4KwNnJ29JhNFcmzEM4oNQvruvVE0lDgWRyT+i8QQwspq3zAui1HBDXF

HiOueBAx3XEo9hl4cDqQgvK3kmebursq8em97FtTwAy/VLE4JHh+cHlvLhoRiN3bfpRYeVzwMvsAGmfl

PxyFrHmEzaFzO03C7aDuBguZJKhFHSB5W17iUf0Dcj
B+NNBz0h8ni3PyoMMnnMbV9qIhLZsNnSiH6zEzbsAY6n9XyMYpS7IDSvJeUyPN2oYNDRf5apu6KDUv0C

1nnO625F7tXlg6WprTILlAM7x9ZxrkYoeemg4uNrvVA6ZA8JNYBni4uPKIW94d2KML567G9mFHA02Dxz

U9JR8uuc6K6CCDQCbinZa35lMLhar6wmN8EgvjxBNu
uJxhbGc5IJ68ehdME/RIB9t9usnbE/JMF/5qWEBy4xLTo1tqI5KUvdPuXKvAW53ebLrNUGT1hTGA8xHz

h2Jya0u/uz7gZ0Eb7hZ1OSYiYop8h3nlYhxRPowXkg8VDGaDZHI10/xnhk92SsOo2fKDcu9b4teTGiVe

6XT85K7DzgxzM5VLEf8zoqNv5fgm0um/9uIjdCERqg
5SUD2YlCZx/HkAOkJzmu/Ow3OkUhgxN5KD2W622AiaQv1PcgV2baftqm20SO0B83ZiKQ9FV76o8L1Zv5

Es1Ezs0qeHA+zx5J0wSbY3akUbLhWCZ3zwSfUF1rqVYF62H6Ik7M5YwcTfbiYeO9b1O000bP21uw6Abn

5HYvW4hwmQDV514tNyW3eE/CNYbq9Np6cClgCKqleA
0oDJ/UovdK+xVH+XYqWlmZGCFZjMxMQfKq0ysQ9fEyEMBImruB2Rq4yGZGCDF4m/b4IxgfFiU3Lmq0fR

bdGqURYwGDLEccO5dq4Rhf8lh6cz64KeW5ZG6Qzg5282ew1AGlVMwqLnROGhfQolJ7lxpm8MCi8SWyGi

1hvnZihDh0tWfz7zcOaNiQJiT6FYM0G/zURNQCK1s4
Ln3n+nI3XIwcCWsgjhCirxiSYF5vf7sYTqmRaG6UJK0KIG1lFrb/pe94qufEGWgQX5UUhlQLc80J/XDF

uerLpAPv9WfLfUujrVQz4Zj/oKXrpy9HPCYXwDU6a3Ps6cy5QGlOdXpLeqUcqPQHVXIThzFyWmUDg+DV

W3vHwQq7K5C2pFVdeEud3rOTlQt9dhtZET3kaBs6yh
r18UIbGiO0hsKFezdhu2A5JecQzEDa+4TUaf7vIq1ZOhSodLeSw/UpixlPVXM0a5ZvYjd2kZkYrMuzWD

u2teA1+WBfjfGa4VncSwDjOgpIZ+sd76DgwfjCeN7O1U5lVGu5h0zbg2DrlPZrrqBAxhFpFZvtuxUKKG

i5fUdYA/32J1llVNl7A/QM3km61G2CNC0eSdpnuwyJ
Jnw5JQOo9UHpgL9M7H2/Goz6Ado+Anjge3sp2/2pTYbGz0Y5TpIxT8XqSbex6o6no7PfuBggzoeniTDw

2KIFsUUXi6TzMvA/HVfebwSaLCTgzLbb7W2W7RcfRcoU+Yl98sCgNXkeuwDMm8Ou3VGBpvg6raRlbmXa

M4AqVMq3PzV0P6raeM+aHnKocariYjaFFwnSKD8UeH
CE/bMsmTtSqZLAhrnAYjUz6M0BxT4WsCPTLtQoNESH5TXamJWSqwE1HCPmFWYJx9vdyAQRgbem5ojbX9

xAAx6hkA/DHkY4EaCmJ0mq8T1NnYaO6N422KjM55qx2P1PpvUs/01v53ZKZVg8f/ddMshctT3ZSH8kGE

trufTkxHA8ScT014pYgb4Ul+D1olyqwTbOaV6p2u+R
GE6aldMss10EI3Qr1381gTrw0WoC+hNN5Ba3bSnN0mwaoBNjBEgv74K3wFXgp9tn6yGf4vIhlcCsQtLH

JXWpjRXU1LpzgZC3fe9Aoq12yC4PA0pEQpZi++/5gNd1FmkKt0/UGSrijkSeL3lfACh5F2Hssu96RBR5

FDqEdWl0oz2a4+0iUCZNBhYkyaw3kVTRGt41ntd8v5
AQroodR/uTiopEDoLp+lenK8a1/vL9QUlsNDwKQfkEjI+X9xJ5DYEZtzetnZySunz5UM+PeC+HZq2Twy

9ByfJofW6dlGXI7Zgs2PKejhnLP+tTmvVbAImwBt/9Fr0oPP2fMRHNHHM+aQAgoGVtJd80rRMohqC4Qb

mdnzRgsULhaO127DPsJ/UhBoNlwKq9w78a/Y6GF7vY
MdwdrhZZyhi+aZ4IV2rPFhehiWDvKteD4KVAgFDLF04c7wGyUtEKLhEWIz0p/fzCKnTT5xWjEgn0yg5Z

zf8d+KmyLwE1h8VWWGnU6SaQOo9OmD9hdrcS6owEoyLz5adr2CpXyqF3/sQrdvyiC4/LW3S8dW5E1PNq

luXWR4bd4hNtgQX84E8iwByPSE3bK2AZjuuoTm1uTu
nYIj14+hKfKUSofeh1CLnA9yzsug7zZ82WIb/RiR/Vd1XT60X3TB7rTRacMgPzaU2vu1K0YpB8O8KI+Z

UapZ2l0LOzbIQMKasQBxMBE8zFlW2JidGmNQHo7/J0x/maFUqMagPnoZsy1QPdUees4UDkaM65lr78IA

rHYU2YXk94ef2nO78tWP4umnZZ9NmC7gEgbRRJkuoK
HnN/2F2ukfTW7mGBIblMF4jiDh4E7fw9yXM6LVU8NXtKCld/9aF5oqwd3j0u00+H+/sCtP36oIfu7/Pa

SLPd4qbuOAtJxpDKVcGEPW8JtGB8MoHDYTLTFBe0OBLIoUPJ4dDgDlCrSOctxU6TGDMjuLHpd3hMbh0c

l6s3O6bQt7DtRD8gjk5OJ/dn9hTa5rkIEAmExsfc69
fdq6IgHFQokq6ACPIySvmqnaWD7/D8jXBcm668zr8lqgYO7WZY+Tey0yqwRMJYh603P9+jJTf4sUYOcy

idjrOkcRr9Dgu0YIAu0SU6OtWPjFYMsy0Rok3PnnE5zO1jnChth1Aed+8v1tOq/6ePd6tCJiJl/MSRHS

d3fX/XCVuiZ96U5AphA8e5Atcq2mkTRgdEe8i24oZ7
G6xEr5wKFUNFS4VZtjIRv8igL0OGpx7OYrZhbcf6zIfqoadIpUFfWfWaaT9ex/znAzLFtvC6WhD6xEcO

AhigeexM07YG4DMq+zhcm1H4B2rMNiV2dripxV6Z7NrRUanEYjchUmyIhMek47X4L02cU+KDEe5j/idV

1N1WpXdiEENNf7NiA3dXS+QciZqfxbjlray3v3Eop4
xhVL1/brodJuE95ER+kFC4uQ7voFUohu7X+A/auKpbrmIfK0GLl6phKVx5Tr32VjcIpTIp3paXwj9EVU

dz446sqxIf7dS9ynvo4q25EKz3sXaBtQtb/CMK9kOSNKzQ962kcmh0SLTZcr8a0DQODJ2sEZ/4/Wr0gi

Y7fwSB9DQMYvhHh2Hy33AAn0HI40YS3a1SSs6vHJ/b
p1zAkm654SYGMtk6JBKqkygMj4RDoC4aqi7xQ581JrY1qhT3BYvasKzcu4vrqsyDOy9vAAnRCL0EelrC

XusQKN7CP71oMQ6HOZXfn1Kj6H6tZkZtwEyLhS1e3E+Vqb6HDuLN5PcHIHcoGVuCqsaYUL2GjM7Rx36T

7kgJ8TNeNJUC0qphUoP8R/yd/GGA1AzRMMrDZSJnki
Ckr11JpKapcVMKJCRIff2CCF5eUuuTqg6M6RBFAkAvaYR6zSh/1EIi+Oa7LIOwd9kot6w8gZkMRUY5Al

1ysRNUDj0ooFfpyIueySA9XyDFKL730HM2aW9P4pCKCIPYRv1hMUWknE2/BPd6ApeEx3lFHb+E03Jm1c

gbedeinGr6Pudcozb1pMgbDXOMzlvsZv5pnM7t/P7p
pt7DTMTBbMS0DFJzXH/KBThuEPH0sClX3V1KK7T2mlmBNRulGmXtUY0tSyWDVlJGJC0XQsTjHk9aSB8D

FfUX/crQkpNx6w4KjtQSt2k6dSPXIHCUR6KOMhAVuorO8k1UjJe5Ok10ezO0aNZHF9+/e++T//o/uY/P

Wmedc/beZ7/en/3Z6/hyFjVnml/TEIf13cRcCTBJaT
jGctmWacFrmxSxkUgFPoIiTiCzqJGmJ444iieIQa+pg6VrR1CjEa1gGplnb7dXMroZHvf0zYt07DSjxW

+9dBNUAa8WB6DYzq/OA6OX5i19gbnq6J2KO+Kilo/eK8yJqOzJ3i1C6ZzLh8dh6q+8Ld4JO3mmX58nsr+Y

Ro3ektqnoPQKfh+yDXUFVSaffOqkfWyjOg/Zk6FTZc
oMO6mfQ91wT5zxoXylbWKfbfPLbCB4K40iqMism3euNECPpdmz5z1HfpBuF8awy0f+JEyKZJT2O55LTl

C5lNObnbPBsWMh+O7f5p7Nq0Vb0oNZlKaEisoSUbPrlt6dR/27N0/4jxr+je7FqjHr9/zyVr+sJf3Xjf

0QC2ml4N5ghuRnKZEX3QeYKp3JoDfD9yjHvOOP8HYI
xUjZDT1tnGxBhPVTl8CNX3umuykGG7JDhq4ynhwzkkbPYO2Hz6uQZRYhBzi0F3yDKiQodF6AaMJZ4LG+

ZdOyW3va1NvfxC0kOZT1tcoTxv8P57ihz+mQPUPiRtHYErWCq8nIqMYo6/hzScmNyBl6T5zcWMj8ORJw

nfUCu/dHQs7Nf/C+6JU2NvOV/nW9r+TbkgmeKmTJgD
gXZJebt0vDFROdfZ4/B4TFzfqj+qd6PVcoCxsw3P25/5K3fg6c8gULh5V6nhW6e50eeV4xXkaS8Ex42+

+hzLPuMGlaqPSyXJvk1VBH/exVOKmlNqUsWKc7du6duXQaAZh7Id0KvMAqenJnBTBosnYgsTUGpTF1il

SXEbQVCDrnGlItZPPlM0lCKbEOCfRh6fjYZU1vaoV6
WGeiA+YAAV2XlGsogSH17qbIVwRGhzVge6QN4k8lngScgt9rIw5Sxn0oCPv4O+/ZYJj++aB27mnfNKd7

PEVOCjmkx55wQMPnbrTKsWHewc8TgRD0CNS+MA3ZrqzirtP/e5MKSRptUx7RLZ7Vv6TebvFw7msqH/q3

qDkvnWxJQY7wjiLxFLfs8btuiMhKz668moZpgVktHe
lpkCL08GIkhXam61OULrOxOtoeV89eY93QFGp5xDp/SqcppG9+YGkZMHxAlTcLr39JSZhC+MTb/+p4+u

zI1kRSxXm9vJNO1tZc2QkEkqyNWguZF6IL6dW4xbzalYiCcxHkaneIb4dmv+RytriZ7lzE/q2MDMEcso

xyOzTMl0fq/r39/g79x/m/t2q+s1BJS6qXGRS5yOjP
r9u9NW4+6fCUUBSZYwyf8YhVEOrCFbSWAaV8Y6aW0AlgqQ6gBz2tlMJpc2pCrxVMtTtRLGqeQGSJUqBb

txdw/ohsmMusuo2sPAzxVAjn/1G1wKqr08emRso+0+JGfBVUeR1IHxCV/2/D0xphdFKtLZHvznRUIPeQ

MupxV+61kgHH8b7VaDpdqKq2Z/5vWE6hxITZ+wBWtV
pA5zA89s9CfkkHCXswzYQZp5gEFYERP8zp2VgJ2vC86H1yVLEM18BlzWfbiV8QLwcI/wPMjDoJHW0fki

V1Au/VEcMxTUZX7RPNQnyPkAAK3ceMoseMPoFFsi3UdEskEX0R3QubSMvRmyJG+WEeGY5qa/ptukZGMw

3+AbXgpaS4nBRVvQr6Xmtv1IiDgUPNvyE1HWUqroz1
Td5GZxfwvyWdsfEU+4+/EYTAlPcsS1t5z9FnFmYOsUqYfCot+GJ0KEsB+yfGjhqBhr2V9ZOEaaEgTK2z

QrWA6AcklbPS70/YgwmYYrGCvi9/cMqzg8i7Sx/RD0gitLgqxsaiu7AvVTN9XZ6fQYFUyE1jQd6TgVzO

QT0rGVAPmDu63FQ9RPavBs5OFya6vPpt6kPioI7OZN
qQm0pZjKDOGW+trTA+JZ+PCa+ox4PwpxPqqFEKa5Fc21PgCsItxGs4PEFe+v4r6CQ6OxO1P7mK9HFGG2

QzZwb/atNOfkf4RTiUZGpFDvdTO3Du0jrW+TeYykI+fwhQbkFQkeQlYuIWtYLuhxuqcQLUhDacPt5It9

EMWWXV8g8iUF4AvGL8CNkeRVbaI+e7Y2421RRE58Sr
wKA8qUITe+6vvEDrEgw8GYEXts/2AiEoADkp5iawCUj1MjDO5TkoJ/4if/35RBtrST2HUFx4hiwZTy1c

dC6wZQRLQiiKUHtfgdz5pXx4g1IJg8ZQ9PEn8LpjIKXKg36W2LTy0fP1bHGE03H1013ytiKD7U7giVH8

57HrVzEPJ0ph/Odws2API+lEOyRnUTi6e3oni5lgXP
y3kho7GftiZYGbNg10RExLIzeWhZZ8/wRfsHoymMq+iCWAjqWi89hCpWUyyTHuPaDd54XvcAiboZ3hIb

gk1kNjc1+LCqXw3+X7gQe5T/CJIacd8kDAbSI+CwmjSz9mEp/oBb48NbILRis+szz3G8bQiKDo53AL+n

/O0AoWlpZb2y+QijMQ3LcyKloz/pqOl6Mz+OnYbTqa
WNzlOguwKyvrTsvsBNQR+SmaUw2GDzI/bchkxB0e5I3Tjq2u0USQ2phveXqBqM2WbII7nMFqlRKijC0Q

JrYgPq7zFnhKmFSmIrEAcds0vsUQkCFyJrEAbD/suqYgvmtvM4mhJrhfIpRmylofeAirz5gy+YKMSEVX

BULcNDT2PhXdzSMdcqOeG857D6UZYXTNnf2ng+6qus
T1nCoFlf4B8Q3J1ZDGAqUhE8+GJVHOG9HNdpsmkmyJxu1zgIsjKt53MedUngue9ybHgXaypux5o3M5JQ

H86zorGtt2VG4lv/Yv64HmUlMrxKB7A0EErnHZqh6GHNj0fJK4cSTGvEGxDxP7FmAhlEO6dhihrPxQzC

4UWjkH5y41xDXVh7LPTn6puyZ+eW6NKWvYKbyV9LMN
ikeg591MLezewViFfAi24IMs1NlFTg8hkz9fRRifK02u3DwtdbDYn+6KcKG0wCVI3cZ0Jz9fGVV/oqFO

5dVUWcaejq/HKxglpsiBd77CXP3zCurGHPETUJN7+3l/6u8qUgoxtDJ9VM8vn2aLS3by5EMnNgLqjQ48

LB5pKOFCjTM8wcTA4JtaVBHKrLoJ3T3w2ZUkCdnVnX
MhpNPa9OZRM5n18EM6P2qgLWfpB2cZhAy7B6a5oY6pbgg8rcaLxCvu4oMQSG0rPdtpdyg4HfuX/1hUkI

OlhTYoRGXXjQNx2rDCYOXbe7ES5xLSvwzYsV0Rvg3iApmvvpavBIg040KsgN6GrqxIHdHaDQdAYXUt/f

5rdQ0w8ge9csJy8xxi9sNLb93mDgkv4uyNiPb0U1yy
G9AwbIhSIzJPgh1vDVFDgvmAeGVSd/Z4sKJ9gbAJn7NFnorWV1L4aof2VwdLZw+9dI54chQNtn2lWpaY

Jq2eXnLRheYDSEvEfYz5r5EVMu1GJXlssLoe9Yz+6pv2V45x+4M/87rs8RqTharVb9lXOYC1BwfM0wZP

6Z5VZvkpx/iHEN56EqFL2JvEa5P7gozTEqEoTcOcJl
q/IsiCavVdlueFVXWGu1o6lgtdRX1W+i3o6II4G/kK23Obg6pJTPe++G9Pzjq3TksP7GDw11nBVOce0o

Qu7fg7r/xitBUP2g49LrTWmp9320IJnJ29rhs2+wFQFQxsvTgF6Mj93lRVNe8xnjkfqflJHKTpggxAnQ

jmhm28vqGJhkFxfxTvoo+ygch0zylYv/XRNdTY8Hi1
fu4Opo97in3jZOyz7AHUhjSfaxWH/ZzK/FSJrApnTQc/JWPthn6ljDv8527IGZiLRWkVmp+Ywtx3cnvK

g9uXpKv0dNQdLOECReQ2nwUs6RlzKbi2aOcQx0OjQy1q61sD6x0V06YA0GwmTnYwLGYOtmx4kopFHq2v

0TUSMPy0uNtZ0Jsxqjkdrji5F6V0MFtGyph48xFi27
Ccr+QJZnvhRZVoP8+LK/MNKTKgJ1y8DMH0WA0GpHMQ+keDHjRR1ByCkv9EnnRbVl4hb9rwetcERaVbax

GkaRyvI/UciRjhMhqhz5/waobT0wb4VmdFUuAj17FwIHUGLrE/Jl59tbrGS0eE/yAPyaD0B0QbhZ5k0p

eeBYtLZSJp4pQsp+jEQc2jTqCqCoc48QkQv//eOMq5
l9tvJgrYuI0etQmOo1eObf++ibnEYmRGi2TWbwvvSLaJRlBceoPoLg1J/Dy98fD7ueRYbVAFHcocxOK5

Ajxi56TDSl+SBBuw4bw4Q4+KYmQRn47rMmjNpN6aolt8vmtnxhebHxN/O5VTraZevwkECv4nYac8hXC1

0fXWVyF00B3fSzJgqEmh8nGRKH9Y7sVsGkXG+x45o+
JRBWBqDEj3r6Qp9yvUrWCC2xUrZirmuCPpgSTQy87AizWyOFchwS/sBSZMH31Cz0RYVSNf0rCu2hKe8J

mt9DMB19Y5PlBC7hLcWy3+r06i4EU8h35Kw49dOrnuI0HVb+Vw4YVgfl88bQMOF1JEQbo5vTm+zlzRJm

RDrkKyDuQe6kzARhhrNecjFkN3AnclWEr/PFM2dX0Z
CJieLJ72y2hoHEG1i0Zz91cVuxyKbhmhV+EY4Cdla2MtYpInnatwpktAo2TitR55qXOZWyWxb4M9/Oc9

3RllCjFme5bot7gYg7lyhPBC48t/3SVmXkg0lIbDUb3wsbBP7FlosFlE7BSTsRek4cT1z/WsT6nDRHbH

LND2satRUVRVr8OfJHkoFE2r/ISRxzSwCLUPo0fSs2
HVDi0rXai2qf8I8oS7/N0HAGpqwUtuFo4dCqRiG6YvM+arvhcIQlR2CIvHKIt5FQcT/1kw3UM5wQV2NA

ucS8e0qLQ7CCgdEZrYAwQLWah9nTyo4QSs2NjBUY2vjtLkz57AhXhp0YHHEB9vEbSjW6Ds3EigOdriY8

+ZBn4cq7u/OjERFG/jsJcZYDnWdVb/PHT5jo8mj3Il
oNYw6iblUL9Z57euU4viTBOPB1+gnjft+PXl21risMnzui38iYXYIifIa1PcWwSrTJAew5X4JbUJInGB

p0F4GDAziOQ+k28XZoiynBMs3zfD+LYSX0YYZDSsn4ifRua9HhGYLMNeXH0wS5gyg4VEare/EFwIh4Xt

VGDKMK5tdAnLBckNr++yg2YdUHDmxx8uH85JzJgC1j
Tm6dEPqv+uxo1EdvbYibF/rtKFP8yfEHMJU62QQZqyY73zgx/dLSIHgm5s5/CL+gby4ioFo0Qmk6TF5v

tPQNsKLOn4I/YWUf7CeKDNkxHFrn+xrFUAyddGsNteHNr6uID6u0wEWiZKnhUzoj7VlD0n9g+EKTMRc3

0e9cojSF2MzeqwkvCdqkpfvb5Yc2dQaNJkgkjAw97P
wGPAhgPBMWwzbfYBTBGewVojJjlmH1OuQVwai4omLzrTOJDd6p8fVyOlqBGKcVaLX3kZTOE3YLfM/yh6

88eWxWTobHuy57ltBgv4cTO+G6KH99Hh0PNnSbBu/gRIMiRdVzOHb+M483zv+BRZyar1dckNRnoX3Lq/

b2721B05v6NI6ZjJHA4pzB5a5f3gBkEgzMK8iThgiN
Wl01US+tp73fIxYp/E04uv/NOMjT4f+MIax88SoWQ+Nn9JrZHinHz65UesMLloKUsT9AZw+3jdjn3cTX

pFXQ7S2A0CxFxmzNTKaG2MVESYk74XNie2YpI2Qv3ITAumdimaaYAp3PuzkO501bt5DC28XgOnjJrm+l

gkx88BmqjQTctl+I9E6EbvH/JrdQY4JfWbXLK4Cz76
c+26VrxJB5qal7Ivfv2J0nyFTdPNmbC7w1nMar2WDk71kQR4YFeAGNrAGRf5lSzg/amScKOV6gBqU8ca

t75dOF1byuHy3U1+wgcJWto8whzUBK1r0E46wmTxHbXylHviow9V82bb9iRDoX+Luxg9rGMOj0MJ6J5i

b+pNqWo/AJmXf8gYWt/Hx4vbbBHWuU9hG1NdVXdxD0
JQdHkjrLTTKQSvdBWH2+MUs3brBwy1M1608GcjMlsDcLG3eDSs7fM2rFg6wcOJJJUWedvtOw8tvHGkp0

w2hTz9w/WDJQcuSyuXQvMwz31K97kIeQYDMfZHrKNVkgZG3iXoiEjE8NbYVM+njWl69aGvyoskAcD+Xs

XKpdEPeKln0D/hbVJ68eYrN5ARY4eH4i59GhletYr1
6G/kVsiVWHD0htiE3nx9UhVzlqZ1GAoK6bsHxJNMn8i+1JpZsz5Tk9VVW1W4hsGJ44FqYzELWo9u6bDt

hev3iwXqIeGVRpsp/O9X4aIWFI4zK0AN6T0cT2/SH03cbLg3OStv/jYlb3N9+cvnuO/Xva+hyKseYX2s

qtC1rfD+b4Rc782SfHc0OBtWNES7d7zoVIh+iDt+fy
vH/Kew6xK6i6GyjKYsEvUHGLuyik/szOPk5Fz0MPZ+tO1M+ys6CHJ7OVeijqMf6nqMl4otb9wts7EGre

qLaZzUKSGRq6L8fvndwR/OT4zVZEs9RW1BWL9WvFagIkYHDyihfc8yez1maWzHIvJBjEXLTDZLy9NxR4

smliKI+O8OC8uTM4XHvUyDSd+WWAcEiMcuJcGW414J
s+NcmZEEawuEsx0qAC+XnqhdTs13eFvuHng6/8ZI6Z5Cso7scFC61racur1f1EzZiLICGBHAznXJ/ozy

bIEzyX3wGg2PS/SPUl5Me9MPjmYxpXOkngt+m7cy0YNZw1YNzfCzv2zxl+Lrf1YJG4UTFHeA2TQXLE7E

gTAHJRyPxwd0EMmRGdOkEAlfYjwDNl05m+ef4SQ3zl
tmqlPepo7jpy9FSvWQs4+7ZogvdwWVuWZJJntKeFt4HJW0wcOqQUmjW1KpHBtB6pUKH8/FMJAB33OIVX

tZhqfrGD5As9dZ6ga+91j+PoHyAhlGoa+9wPws7Y10n/dizGlhvMrmdZZ+df8UtMSULPOjKuKvxOpF2i

zCAzGZvdhWzp9sESJhxt3005Zv/DACQuw5YZd2bFot
yRB5kfIu23o6LalmYADzDR873DUCAOwA7tUhDK84AuqW8sVCDhkrGKyJ5TGIn640jqIqV7sqyZbb+Byrne

9r9LjqHjuCuFzu/m56ldU0GYEO7J2h44LElidUhrr1y8coXRPiMGEQIBeRirDRvqPkGxJzcZEeb0/X1l

qYJklDZs23Qah0xmodD64s41DWsOMcSXgE+ft3CVFc
JWFOHEZ8shxWpMfsSh7KVF0jsHD6YZTTwGsJ1HJn0IWnNbofuOhXDl7JvmB8UsUV9bqV/r8GN2uWsr6a

YQugGewyfXys34Q6Y8th/+rAcZ5+N0o0IMCQnWIUpHaR40l27tBpLmoPKRwh4coCFMfuj4tPh7d1v6So

oHZSpGmfuv5vl68u9K9zeMvb4zc/R3ZkwRX5oinRe2
EwW9P14wJ0ucuTdm+q+MSYkrwGmPeA/+0pSY0Lw/t91FPHDdw/8UeecZM+57r78NFfv6NZT2aphvn5B0

EbeW4RT2QTR9oJCFaa3TRuM3MPtqptActthWAZSAM9YJcIQGe7awwQC9n0kr/f2adW9wncViJaGfP/Nh

zYI3/iASPgDYONZTbmInv2kT2zfxl4n7VCkj06DTyo
pHR85avTZNI+6khooWsohuyUSvgfMGiyuu2/9w1et2rSxeZkIG/tlSw8q+iEf7kePy2dCRwvSMs+h9VM

vjP9xzuUhsl0fSwYqnmPvEdbMKem+g3keoeLquP3EdcyjH8eZHgAuFMDPXKrZwWClJoOY7LQfdyVMlwq

VtTHLBmfEA4Bw3jml7ZqqEQZ9FdR1aw/diS9BSDQtd
Z6sUSj+mYd5Ll42fsFblkEUow9w774hQAlbVQsNx14IhGFh6eFwKjILepL46h1qvYhRTBYRfHRSkQX48

AfGpob+Fy/wP5NHmZJUBPTbrti+DWZrFI0C+kB7aeRvl+XZgcxZbgnVMTDlKNyQD/ChWRz6T4I32qj7R

k+X/Fkiq9eRHv9rsIGxw5mdve1a4Irr6otQ2DP9Svs
DRG1htIumZpXN/Bw1/WiS5UzTmndv8T3nhMWXSbiInCmCYrzJaf7m5OmM7Epi4DwownpxXtG2RE4tSsg

3rzWSWZ/xZJcYU+7uVx/G1AfD6Ig/2AftfG5UQNcQvFSXd451v/4reLNKu7WGWJLumCnbk7LcVVLeDCj

SMET7hVN3x4QCM7/rn7X/9t2f6Z3/OiC701m3Vlg5m
96ptjBYTNWxvPp9o0znGttjLb7uhBRRJwqGBxpPTLMwzHyD2A2DwehmC8ZC5c2QUBDdYyxMMZP/81MGi

emgaIGgNXXHFZE82Qu1LwG17FknRoVkA0Yg79Ay/cX89bNrV+fjmLX6QgDp6KyWI5Dbv0rpFE8QtNzsf

3XPRYhkN9G2lmk8VsTEq079hsDWubmgSFfU6ZXOH+4
Oiy3hxjG6JXhLHNtufg+Tusk9eSSQ76uiRt28Us6JrrRIGqNzjHJwF7Wh3+0FVD2j6hUdyS4PiXH+VN+

VlmnVyBVyBfOEJe4iqxRvozofmtbHHN86W8WH3q8n+dXiDf6huuCavLqhQF9BZQIeIoyxv7Tj/AfPrch

K6VTOMtiIyhvE67K7quDzxa+VYUZRPppU9tFarfwci
rA0wBtSOPBYvxAt7i9bFiUqBJsLiZBmW/JGLMUajNHF6Zo4ynRoOjoZ34NnwLdUrCDteeciJlZqHLPav

koxTYTSKMNfo3x0yzlgltw2Bdjb4hGICIc/5ryMP1PDNNqSYCk4mDwyDgd/qPpDjD8Lq691jkNd3NKHo

7F/RvtlExEd2TKNeUM5E+PUXwtas+ar3kq7up0IJwW
NHh6Bjm4cR9TPqIN3i0a/AgUkv1Q60YobCz87Ysf9HTlItsPA94iO05hSlXnagfICcws+dlAqQAwhedd

E1hZfocATViCBi5CaXfCyScODE8iX4R+lKb/SyHr1FcC4zApr9C72oEtgD8rm9+zAFyWac+MSx+jpFaI

I8YFl2scV9K/9eOXR/Z4oRWMjOgUFYIczPxkerpxNr
7leYCsPWw75mbtdt1mCebEGbQ6lwjnSalFMWbUhIdWby1eKgdASB14Dx9vHBLHq8rmHJ8XtXQI0zMUr0

HIP4wKN52BvCdXiXvyS1rRtaEEbjnU8ftwXZkA+hgkIEBkoxMsLomy7wLpK4MpTFxt8rNlS0T7kHQFVz

kUzDdYGe98nVm719b7Okfdo2iPYhHU1At3fuou1U/s
vdISPlaoaJVD/egpCWH5arASL21WPV0KyeSuVmWCY1VtD7mSE4F8TAOpSVzZfPvFnGEmzY20MUoNlopq

oxu5R1fuE6FWJAXdmUaxd6nUDD4RRKdm/BbnkRN3giPhG64vYQMu6TlFmCC7aa6AHuSDxxj6qgDcXCm+

hGOEegZL7nFbtuJ0PCpHpadxV69x8X0hTFeUJfS0sP
L0MC+KInSevGjksAvsDUIzEr6LyNuBvL2TyGGuD1Yoge9Wg5bmoiU3UWLlwYNAvSeRjQO0vjmls9oTqN

sZnvEVCjnk6NG7Pjba6PnU/OuessKr0Oe07tnTjwUQU2F59LbSYVOQTc9fhcZG6BQ3g5kbtCrF1kiYF2

xyHHIP7jXwQCTcivHG5aufN4NuZ2JUQswjZlcNpGcR
AcPdtm2BoaXffj7gVNwBIfA4PuBD/90sZusM/mX2A4Y/sfCj5Fb5tg8KIa4riuUQyKOzJ57NOZZ3SgnT

++H2cGQTJ8l99Mz9I3AhXgAvJzAu3eZ6h7TAaQ1WSfSOv8tuA+sQ5oTxR6uiV7Kmv7AFWpdn2XwQ7Wz3

lgwz8NoPlWTLdQxOAyIu2A9l8MnFFjBBn9oYZ0cQ18
meJDNIp74xnje03FFcl82oUAFOxztbU9R0aaPdB+xLwBOhC80GNni+ymdno14FKpH3L/yap799/TIeNp

D217H/HCPysxfTAufi9aBZRbU8R5DOrPNfedn8sFAVwrXb2127pPgGsyRlaiA9/Xorrvby6kK3z90CW0

fjsaR/hAbPGdAJscyhYoZYzzQ634M0BgY4b/ACNLxH
RVfexRuLfVZWu11izRtCpEhi6+PuE/P4a5fUefWKFczK+kO0f6yf6tiVAXyqPTdUmzLKiFsqf1Z8XqUd

oEd2sQTxjfsjvPNwxXhCQjG16cbHOeoMVnCa+GW+JA6L8RpZyDjU5gdZe4ZDXXGgG8A/0zO525IVTwc5

XFlSWo+G8B9NsbgpYrc42ywtud4WKBjp+YHt+BS3yj
+ta3mQInS3TTqX0m9Nd9K/leqiXAJc/u3d0zZsM1SEkHa6xZd5uHe0uoQD2wG5MC/nynWj7MM1nxU8cM

ReBGCjWWcO9+6dPmgzwDwCybB+QM+Y02UlQb89uB9hKcirw8RPc36x/GFHe0Buj4HkFJcIYYqsvPTLlR

4FbWG9w9EkoKAvS4YDoQh7LCefPVl660HKgwEJvkDN
oQZBGXQRR0Zrv34MuvRHg/v7QSWS0EaCdsxhaAQLgHdUkrL052C9mLbK+TMHmOx7jK5/SZJy53lvHurz

zEhuzEF8VG3mVdeJHz4iJ/zva+FvQ2uhF+e6lpkNMXmCyjVarrtrAe1iu9BEWMlq32vhnNWbbx9F3bvQ

amD1alNGcwb84tGgODfGdWscINz84P3oMlwK/b1buD
N9f2HCENw9UvotCwJXU3614kNZ0WGC6+byoAD+QRA9tlihsv/K+WfXL4mFCVmgr+dSVAT8DjjB6Uudww

jhCvSqXCWzTy+cxJR3odwqhf50r5NsoMTvFqgCT4FA8QqTJ52Odl9e3W/2OEQlIsYO3IfNjyjj8rRmyl

P2LneUEGhkcp+ntDIQugSRPYq2R28Qg2TVNQGl3dnx
PUWpZcdDUp4n5iSoBBAjjyLTTXIG49dytL5LswjffjxQZ7IUEAKK3PAJ6U7GlcDN/mfxgHVvmnR3zlmU

ctI0ngmr7lt+Vl4Y8KTk2N1d7LtNFKgMY8S65ft6Mn8QpOkZAJZ8KxtIFJT96IeuSXdu4nSW95HkA0C6

Lrx6t2hjddqaXIJlVLgVPbr2Yxmy1msIHvISnQQHIq
92H0S+Qmg8M96qEZcqPyPyHN4mYkY2rryFdIIaxH+bJeTP3PHiUNU7ZQQZ0jXmbTN8WJs2FLLZktIfXA

gdH5uqrlgSmcRS6NN9mQHdVVl8fX4ZSgQXpkFQiTmrjUjN/kRXvVXmvlgOLH1nfsM7ZfeFs0pGiihjMG

JCnLJa7q6RGlJo/mdfkjxZXXt8xBsquUK3gqRps3YX
1vl+2zaUEcQ69gjyCD3b3YgGWl7q4R4Vtu65mjyxvfufuVsSen3S0rbN8n1+mdnevE05TcFrF9mWsgXu

NgSS/9MHCStbeAG6SuP/tMXJoVpQWGLz3cvs3DYOC4TKzDgriY/+XqjF6xZa/Q8UuwEoPhJjDJ2NaJi6

S/14Vm+hjT4e9VCsFZRmJ0nFt97QoKwJihPd4hk1zf
Nd367TG94yGbA/jv59MINe+BI7FSRxLsHHcmbb6cD4X8Snawe0/WmMDQ//dy5A996z4eKcpi86DoY1ue

1jxd+5Kuz/PdQriCS76Kw3D/KxkxE4mOicmyRk5SOhVyuVsb4zTLx1Bsus8Db/lAUCbJcQVifSYh1hFw

pn+aqotrt4k14qonvoCt2326EVZdF0/k1NTN1dI5of
L8Hx3pZVQNA3m+o22NW3ymLhpWZMJ2A1JX1zCj94gP2wJ1O/aw3eu/0iDpqdd1zdqVRgiaPkClWtOPMN

I/ix8NMNSrajTzb1kiFSKH8N0r6dGFSyLsvm7oVHWXJ2KfzOKcLPwOUH8Ain8O6nyiZ4sbLkgrdzrtUu

bY8cY6EBoRHiGEyAm/FfQzBhNvRapMMcKA7IkG271m
auZ5NATtU2QY/IaY5nE2cj2Z6Vg5nH4L+HvS33UsC9DMCOa32LRJvLe08ukVYhfYEMjket2SC6LwggcN

q5xzthR8cOU5zFDnQHdpiW8vkwKZcnWugCEP0VmyCD0bNHvj6uEqQlyVLb+KdnC0/cqhWu7CzuJQuBDS

TXTbea0XVSSUrOgIR50cYiVYTEV13VPZyheiOHG1xU
V8Hzn92C1ludbOpIDaURaRjHHHv7/9YJWMIxcvSoWRMNsjxGvMj8mjZy2mH/ztiQt5H1SfjdtGI65MII

GSc+0O64ysynmkkMjl6SQ1PgPl0/atJlhet4NtMxjlehpRD5O/eFuHHLdWlGPtjIJmC3gf138WLWoSS8

unYrGZPmmLkG6f+VDyRG9ZLmUSN3KVGV9fqQBxSuu+
j4qZR6vElQmzBR0djq7UZrdHqEpMXIlNP8jSoM1HH5+qCci/Zqes0QitNYH9Hoo4MZD3qHJ9DBHIsTAV

rulJVFvAx1UUMyaT61wRHJdR89tKt1pAN2Z+/fuTP1U2dkkDxIscfq70QxMbBTtmxh+YFU3I3LxKnnEk

pQt6tGGxiOgbdxmENATIPmBeQi/5W9NmBUYy7TOlsD
U9EvszYPYZApvKjVysAPvKBlAD6AhW4FpuRIOB+/HwW7GlPEVozZS0KMA4alBCKE7IFzaTsW3Mk+EPZ7

priPWXZAnKmeI2Drlnyp76kdFY+YqWthSChCaR0aTiTKzl4aEaA+2zecn7KMCDnI00k+vmKa1h/Gv5s1

tHGN3ubqAcCUo4H36c05DSZb36hMfJyBcjG+ld8Dug
QP2FeSBHocEnKni7oO4b1GSY4hKmssy3922WirSPfa7OurOGov+rwGuEmHjy6IOj8n6SVeSVIq3Gaac3

tcSRnvFqdHt4FU15YtYSzOnk1WHa4ajMPJv7RGwAV2EpQHuzP3QLm4M7qigbemqLCrPM9or4SnRpBR4x

Pv+SPSFlh9tXqoJH/bUtBANq6yh+LYx78PX+mDsusV
ld9021b8mVXld3n6hexn4NxClsr01mXUUuTbQomDPTL26cuH8t/cINl/MPm1t8Xb1HHQcakdMGWBqTYw

/USrAriiVnT7cUkhgLP+5OZ8TaZS+tUD2fEr4Qptbx4A6I/BR/Hl+pnI0WYl/smBbemVG3fMoW4s8dvM

9k9NPtUqsDVfLp0GMYt8WrglT8FISnXGd5KI3rioNA
uJBJ2+3rq+uoXYvEWUXnikO+8NBk4605q3p8/pVEGxhJKT8jaLr3iS//oOXTe8neW9AVqKrG9zSuwDVh

twKQkSWUJlXC9u03GxJidDMxQ4x3EhBlUA4abMbcyVCe9nTCG2f56Yn7TKu1S87fkRJY5ae31TaFh2XD

iQ87i7oOxMCFwz6j1ZidnK1bxFO66hocuHqamakayC
6qouj7f8upCXKNNJNCL7pUgXOrgDm2moxB8SbKWYJn1yEUDRbzpFYtDHevY1Lv9goTDnO8OD+445IbGQ

aBEM8XAf732i+tww2bPuNLSl74LAZ9VmT8uWOVZ8tlTB/xAt//8P0XL/C2U0ilh652/rfIIvpVYq2Kr3

Rn7h/HyV33/lilb4yquMicaBBCmp6cBsCDcajcXcs7
EcfBg2xGEzaV9vpdGPaBM7nQ3qJNSHyg4ijB5UqbEEpTQcZvk/bQYx7Z2e7nloql+q1rQa/Mqyw+s7GY

sFJ5iAysQOtcp9cX5rzxid09cKKZ9us0TTDI6/CLx+TzIOM3qoWUhlMTFiBxKQKZECQ+ShMuR6Nf4DAK

LiA5nJvxzbvENQwtAojH4tIx38gi5yD/ul1GUBdajB
2y3x78+RWrjP7WAm0kyh0Rsbj+haHSi757eLEKPtFtooMZSthLuQ71L0DxEkxqlLat9PbZgZ3TDcsOfS

0tRply/0J1+kZRcOQHoC8r6lZaPh35TXfv46qWFtRhzsUlRCtr4B4WBl3ulzaEoxkVY1CbEtkCiKVNh7

UxSa8Fgd3rV6NmpWVw1Zco2Gk8YVwo4z0lZ1stA/s6
3H6RYt44j0GfT4WdjDn5K4PooRwt+wQs+XGgp9R/f2B8qpFEnJrbHQkPsCLFQ6S0zixKkT6c1GprQDqR

ruv1dJ3jrqw6KWbzmwfmH5ia8XkH7w7Xl6wWdk4Uc/DEPXqN8zW0yRAcaAqx1kIB3vdnlAT7Qz9sk3ft

zw0u7BoB5/6Z3XkU9HSciPF31cl0147addxMsKmRyM
r912ttSKi0kQWWyGIrGse5v7JjnbAhl9mVHtKmL/Wj29zmyI5Avih3sCf2rDMpAeOFNb/LJ72m0EnJg4

KbW7eXYZYeSZnoI0w3i2BCfntoqIrSSD8d7BAXlvAoaYxyVSXXSFEJ3++7L8+npB7hXIeIMdRzkR0lUP

G8l/gAMHZ5JhM4r8nKXBins9SOueY4iE+VIWKCOrkd
qVypDv+1WdzZtU45OBe/h/NyagGxONPT1Yd5AdCc2d5G2X5Gkpw+ISHljXP5JAk2uXASEaYeV1hpUibN

N+pbGgARI3eAjsCEYxkkmdBDxfn9Oi6+S9tEmZ+cgYRPabSJ+u3vUpendEb5cJ1lXRGcZ4VFWhRiFAFf

4Y+Mdhyqs4ZiXphVdZhqD0Dsh/jCZXhJ5xaO4Mqkpk
vebh8UE4ftr5mdC3JuKfD+kzQi6d4wvhrpaKQ08tq3Qq+Dt8jV2NIVFDRUpU6UQWSRG2BniguCQHt1kT

4LIddB/9JkiEN0ZjWt5JWc5w7Jh6NnYlExh7bx7JT7FBORMl/w/eKmetVR4pbxzMjBwVhhyko+hEm16x

9P1jtmwe/NgwTY+YyDuR4wQ+8jnfhgy357ESHXzdL2
5UkH8PQetWklhEV79M++sBILHYAQrfeMaYXp1aeXQrYbcwc/PErPpzjPZeCKLc7e/9sJQG5y2kadaIbV

fi8LduaUKbEk5RK/Ie5OrHUgOxIiqJWWYy2yIDuu77Lp6WS4UHe5bv/Vh48kRlOaDeDDJUkEjB36yntq

Slu5udZsrejbN3fLQALXp2CqsAstIAeouII3U21n8N
lD9LKqBRP1nr3+hOWUONJkXACprY/TJul50imv/0Qb038I7RSKILFNR50rc2RMbc6mEaMcOw9EUDRB4l

fbzK8wB9qa6SklmRG122IbouoUrVAOOG3KNeQAtyGSQnOU2Dj0ki0/FqLzloO+OTowJpo44TsOpUkOWM

h7IdNsYYqwZFa2FdL1l46w3ySGRB42u1C1Cfz5mj5V
yt4xVRn+8zoV03bjLm/2DVcILAvzzZWsuwJPPoj3QZ0851S3X9yIXsqfv/Vw9fPKqi/jqZJ8gNofYW/r

YJ/QkSYCFPWn9biXCpKBEH7y6V4tLn9UjGh8tyNGFMg9wP9I/IUkxuBd2fZAp11GBByskVqBfupGMXlT

9Xdn++Xeb0TZNv1bgz+tBDTj5ZIugJf27ZIcpJYrd0
1AUvmPamnJ2v9YRG8OcfKJqVyrPBEczj5DOuapUcbzypMYNOFdvHqiHhCLhO3/qq0FbazfUuXkiksiJR

CRWmrfr2F8cz1GBv4mn4uri3NcGT3bOgpRX4P/dOmyIFd2q8VzCIe1WqHeH6F1lPbROxc/qzgPW3v2sD

8YRKNszH1ybozNcDIv/uGgq8oqwXvdjE5fQsYsLYfp
/tFwcJ0zT3XsEpYKA1Ab16UJeV4oAOX47vgLz6Uu52UX6bPoHxeuH6PvYV0sg6PqXVBWF456FHyJGu4V

bgQ95LqbrwzGHzO6rLd4Z5OKkMR0AV2R6hKb0ZfNt/nXPWD+SBLhhzmFjFSiSPhAmvCoxw/bUI1f92HO

aJmy4wFxC7I57goRxzcdt1CNLrKj1jtyKsllgscTvt
UyEevL/t8vKdoHD8BEX6t3uxIW0gjwchq0I6xcjfY5aymeaaLjrXZWXoxfYxEcpeZE2GlRJTwAFT3J1m

8kRsg83rcft+Gs7SDyZ3eCgnaLtdZB825cemdbCZFKBwNw9/+e5jfaCue25daXsm2dDGz4C1LJOrjxJd

tk8YsGgSQhwLTERUIGEjV7ndQWhoeqDic2D7BFBqzK
vbhCotnbeExzqRQ0o4SXS/8wlje0q0GCmwZhnBjBkBjLC24VZzC5Ms5zGo+N76oPcfIfb9v+Ny1zAjIN

yvVb+4xPjmM++Oi8NXX04w4itYfGcRr4b5ppWlOpnq+aeNQZr+7qp6BRqRZmqj+LGtgH1Jzc2Wv0yg27

s4c6hZJR5q9VbSCIWZ+TrZXvm+RbXBGKrQgN2tS1sG
s+okjUqV+a9kRs57OGM+q2NO9cCBxWdh1Oq1lydPAOlfr/BiEr02nBKfDdq1B/BqmLRIqeHY+lPMG2t3

F5ZgODbyyCgXPOJh9cd5L3pXxJv7gaUFYQpQAhf6oBGMKL/cmsmQun1r2XwBcqGtCchLuQlDOaa3OMMK

USOK+fSZy45L73oGgJIbsfpwgSdoL42Ltj4J7niys/
oae8bxQe+tFYumrvidMvGgtdvrHYWz+TcMWQcZ38brTiRMEExbfEDdVEqG+Ui45cfMGnQ3yRXrsic+7U

syqp69bl0Ln3W1Ieh6JUatPfKjlDJaotOVzwzozoZ/Edq6am7Uwfx6g+ysuwfU/tv1Ytvs6NEpT1iN/S

VnsQApK5hdA5qetVs4/afIQLhyR6NtnpjLwkrmp2ya
wuLWyd/9Y+M0Qzp+AlmPRlt0jAClD6iySV4GvGU2c7T2ncTd4HxCzjk4Rr21qOnO7bu01ml8oTPlatA6

qEDquvdGMuWxJcwicuVuPioMEHFEfoljgj4x/zBzX4DW/Z20Ze8X3ngApc4OqzFxht4vxK4uU8ZRqodN

fnjUSVuyT0gfr8UBgGDyZISOaxYOccNHcO2xDJXjMR
kX+b0AC/vKpQ3KIpozVPiLBItDS0hwSnstlu+rOOtdjx4R6sCjw7/CbipseH2U5XVsc1OON1Hcdbg8T8

1ArrAr97ecppUZ701EjzldzcoddsCbt8MaT6sypwmUmD0LacUtc5Khvub6uON4YZeUei6XVywHZeO+wS

eOQYhf6ySuwpnN8HC0ZoMgZoUjJ3qFT56ONWVaH4av
vBXJFizW/WyCF/5h4rNzndrYLA5OiXusjRwSFbhJ8DzI6jYOP6+F//UBPgp8mEimp1Qe8obpe3kM1ePk

Bs09xigjajRkxREHtR0cBXkC5CfgFiJUZJD/QdIYbEcKKhUqCPXSaCcFI6R817+N1KU8/dZgTHn0Y3WN

3KSRQikJk5A44r3owm7iEfeXvLVw/c7ArwnwqZlRHd
tKpuOGOoB6Xr97fdVvoe+FwID50tq0GQqY1t+OL+ojZkmJ3fs+J/DKChdn9FJm4mOBpV5tncQQ01HMa1

rXZ3UjtHlfrJgyFbM1914kFp3HXinp33AYJeGycTHJOOxvg+WO1eJY27tGx/jhMvDIiVmkjWKrVD2YZy

A7aaRDd1M4IQOLrj3zzvm2yKz/xg/LcQAAIx3XQuro
s9hnbLiPr5sIkOfMv/BxacmJy0rj8h6CLi99QUtBLeCTh7dB3AN5ysTHlMU5agZsTcLGCye7dQpfSH4i

z0ZysyB9+ldifO8qc9QOK8PEhDaYEngmG0c6ZNn93lZ1enc1+H87UwB8fYxJIy+wiEJ8lWviP/126JY+

ni7OQdi9Yo8RZoJENhfvzBUVN+0aRZ/JoYc/XboS51
5i4XIbBywNtyeitwStHhrHfyjsSGNOPUg5k73uumLoWeV2Q6Jde8lK4W3QcF1dCMKPNu0ooy+NNKMz7v

UauXLLAwNSokDn23KkAn8W1pcXO/FHabCLzwtuVB6ZJB6dOG0czu7pGxChpp7rJYWl2j4cS4/RV/kqPI

+lgSHFsHdbFqJrQI78+Nk8pMu9qupP6cs92f7zD+wR
bNRZ6jLW1jULAPXHxMboVCDf6Q5obTy6IDnFfLqN+VkXdbyllrWyqyFr/sloPq4KNtgsnPrSswN/U8Ab

L/6/BTzL/49W94JI8B9w7FfA8sdZo6lbzbp8LUWfIYtzoF2X9FsOyi3QYttMb903NDGWfqHA9LIca36w

pXlQHgnb5sY9xWjlDPADNmZ4IdUtPuZkcNk1InGZkz
TabtEc8cLtZ3fHeBNtRSba+CGdx+RIuWIWcCOZVV8FYBlFgqNQUs42TbhI0ihr1t7l4ggKO1dUF6w7u7

G4nfHCMm1bVQ/Bd3F7Gafqt+z64h71nWbDHR2o8Yx308TMu2FcFAhohj4K7sZHwqyInyQJJNahcsQpm6

SrIdeNFoW1uumB1sxOpg1+4awz0MfmvyZeFlcwwPm8
kctvaZ2814KwN/lHo/Ue12OOw2+jB8grCxFfknRQwkMojJbNWz3BjGEkKXVpAMsl2PIUyPiy+odZaAaz

2t6fRMQqDI8jXTv5Z/cgQkRdQ/e/Ge97SEm29nG06/YhwwAwkxRLBkyVO5HvcdSi9waFU9EM24Tfh2K7

zVp19qoprYsvH5P0A8rR1loWI9aYaTS+ECU1FJUhfS
P64mVUenh5Fr/FDQwcY4ahtmm7HY3UiqGvz0mqytI5ewLtngkwKquVRfsCAr8YQaNO7sjoYUyOkTgNsC

1Vilk8pTUQLyYkRWMTT/SrsVTxhNcv8PjL1iVwbln2JkFkQzMbABjH21Rrd1Pa1MsVQ+sdGgLFmzEgr3

Ydq3OhL4aIv5lWLDXla+dumbkZBOGtKmjRAWS1z6gT
veEMC5OKkQ7l6XxxXjg/U8q/8bSl6oABhU5umtEMkvW9c51n0/2TJTPgPprMn6mgRfelbcd4yhEQ2jXz

fw/tdbz7TnX4lhuFOz73QZ80DViym+GEF/D3JKVWcXB2nIpXyIHdleff/cllCM/HYKkxO2nJQeESL8XE

pX6roqyQ7OQ2FuVv6DHj9nZl/SAD9ewXnpdTAruH0F
4RKf5JAGH3qTwNUOvl+WmzU8R2pAsMZF2P8jFUwHM4I91QTqfdS8Ff0z1PfnAsJCD+addLActu15phS6

xvOuMM3oO8CuVlNAmddeX6E/i5gMKj7VZpHIrh5YlFtljw0ejnZJEkzYQu/fvjI9PbiIRbx1xPmbgfVe

dYYupDlJOgGfzBpR9zGn/h9B1toTkziL4tin45It88
XaFDnOPPSPndzcFU2fLEEEJhjrxPqe4YYy+TD6zAwMmkiEByTz3S+zQtnmC7nCkQesI81OE39ikyHifh

P0IS8aogneFIkhAECsm2HfpKuP6d0TI9CPlhrCkSa2YeubJiPYKgwx5fJ8tG/jmzBu7mg6vH1f6RTZOU

N3PPkzO3nqiBMwKvUWxmb6LzwZidYtN2zR5tC2fo7o
wGQ7EZpdvcUC1lIQypSSe/sg7ZUbpne/xV2WdSrhK0/w+Q9KQpfELcnnMywbz9blcaA9tN67xJfF/lZ5

bJ7dSHXyG1l5bOziA0Rv4knavYB5frhrMzw9aE2DnI2nFTnXhvPwk9t47/F+vzbQHR8QnYZttVfCEd37

yvyQaysWCHNp8mymgE2kN8siq981glfNPxdYrC/aPg
TfWwO0ealP7xIJvKP0byNYYkgruc4LwXHvrAlFJCZxqpRHZ0YnkpZTYPX7mHv3aSPKEtyZVuFDNnR4Bx

Itw2xB4YWWyaU2Eb+xwSzPh7eDe2xR5S0oZaD5iI+9ZaHcR0BB0kzOUtPXY+//G5O9ipnak5Yfzl6zkN

x72sqwEmIS/xaxL5GkAikwLLloYorK44zVp7nM61rC
gUK+0Y/P2TP0VpnXWGaSearrq/IZPg3eReeAHNFKfHLSWaFivNkaP3N+g6Z4A1950h3IfiHhByxBbolZ

k7M+cFuejTdtZ+SeaqcF/YIvw2a3qJcXjqAhgKazmJ0PY3mdQx7KS7KEJC/l8MRZWGElSBlGS/0CVvIZ

bKsXW9+xG0zE8dQUCqt9BOVw+u0onE1+BDqJCOjCwv
OehJ7X/dIK6HosiQh0dajhoq2snh6127zmtMdeLbzni3hK7U5Gm6G7qlN1btDMlJGGvk0s5RLJ7zw3bC

wxFP1IFyC5Ybkls7/OjmgY5yjGF5q79Dim4pA2N7FI+p0+VnY5bHCZM5Ld7xD3fw2wBPUaGosw1x0jOe

Yhk0ph8KwNNTe/M6e6oMsZWQAG6nipjuyIkJyNetxZ
PHSpL/1em+N3duSas6Vb8G1eXSRM42phy3RAxsqeO54WjulR2TxXDcE4RfT5b3RL4ssk0t8jamrm3iDk

nqDHiUo3joajNXWMGiWZv2w4HT9xgv1fJs+ugHq9jxswwp6whic+qKSIAwf9BJzfZ3tmydBio7f6zDgV

m6HtoQa0jAj9O/SlV5F4gOHsbLVrkvPf4aDq8sevGL
7KT2YGkUpXqF8HZzyhd1Pb20OpWmILr5Frlq+e55DACrpgo87pcF/k7TtCkFuudC5g/pHDCJjIUChrQx

OX3RC9ST6j3nDKJK1pgBC7NHhaFT4ZxEKN/neEljNSLLGQOwuvoG9TRpVejWoP3zc8+5DaYsQ61lg16H

L9D6S8DiDGgXl7PYlQbLFNTjVfQ+KlEc2i3A/jtw/F
5Ces9WJu2PKS4/tEzBFn463MHc1j7Djd9I/XEeh6Uv3dD0IbgvjQvm92AenN3U1lo65RCUc+ib8lWIj9

kh57/O7JNWy0NfSMr5xL/CMKkxW0fX4IPpVt28HRkyCwJjYKn1HO0l68+Vd1uDnCEOeMHlDZreZt10mc

g8iOam8aPnFyOIomUPVUw+4I1+CiYwIDn69W9uMLRq
ShfRgUtA+i+Rpi9xGKNMXa8TcjhWmN0RaE0OKNFwtPWigzwhFL1K9L8AxlZZkVMouzifKQhsFjVz+JG6

pg2suSJ1kivZ59B0/ifFEntgDn+T8sOWF5MU0dwWxo96AMSVwdQY1tBnnshHDJjvqYLbHwStDYhRMMQu

HRGf5eCH26osmapBHGG6hF37/R5Qpfq/e23mabTitZ
Mrv1R218o0zHi3mpgrFZE6wKThOAGvr4fAiAv9hgxvGjWvDoEG80Tcon26o5lisySsjdRCaNohm0pVFF

MeQXImPNgQGNgDRx92RRXQlBbDZmZivUflwmESSIiMeAhKFnbs5V94U86mjuL8Os8m/9PLWU0qzpMhF+

2I5J060+Qrv+bI+mYqB7CZO83FNt9DD6A7cy0TNyP8
mkY8GK9Q7L4k32tIZk8RGtKWvxJ0+r84qSsp8TLmOXBHQ0qCKrP9KJVH3VperiUJy9Q6Mh9rEQj0cJ7u

Km0V8//HN+hodepmAE60nQNoVrCDTAfRn++ARnqeZ99kxxn2yrexzGsLtJ5Hf4F2vHLqsI2TAAuI/TOR

0e7f3/zvfV3GIfp5Nft2HZtPoQ042eYZcbgyPG74vd
2E9FlhVxP0piU0s9JfqDUNaUsBmrfeOap5v/pepLx+HoHAqQEyvS2ltoPBVBsiTRQXngmkmp0xJEye2S

sx4LuPY87krH5BddHF0V/mNk5XTKjSDv2uf+A/Kcl0lRgDuQ+xSULH0ywh+R7E+HatQfp6HFKeqyvC7n

lG6KOQ/WcycAajDP8MKr6SWFvrRYL3hPQ2t/FPjF9K
+XjdJqsJlRWvMiap7BCSQWuJiPZ5vujS8jVBvKIm1K1RejplIV03hJbvwe7ae2qCMOOZzPZP/1RyS72n

aRXD/151vcOIu6rHlkIT6ppdSC/jKI8thv8KoOLK2J7mmH2aIoP5iHOtYAzCsl7I0RbJgDtWmcF/bTTg

3W7h+DT69qpliahksEfcYbC0/fVkxdrl6FzdXsnUs/
fWAHMka464aLv8A7pQDhVJt+5ZuZf1wZEBX1Pmfh5hlIaBmbq2QLwo3qcG/j71+aDvxP4nWYNzLh3P3P

MylbGU1FqUWCE4Ovi3wvTa+YGPXBZIA7Yi4aJ7lsHl2zNDZkaxncQAnZOFQ4M3rc8OhjOOpH/QPSCT33

rsFteXI51VNc00VbKdCn3VqP+0YKlXqiF1oc06X5Bl
H5cVFl5idUgh59uDACFlsLEY4B8sPUIF4YbLoc6qYDP9BKDVJqYSg3dHQYKR1+UDWZkY5Ge/Uh1Y9VMf

ipsBug+OwMKW8fHsidYwFMS0MrWy8N8l9IGF8W2kCoJ1T3qnfyFyrxUfjowpHFr2SEt2ZOGbL545+4b9

U2cfJjamVCB8WsvC5jl6R4IgoVHiLJWuEvonjLHjns
mzm5Xv460WEZs4rZqO5YMa78iQbhojK1/tY4ZGQuO8nPiG18u1cQFrvvqIXpprDwCCE7/Fmo5ey7tMLg

3S7IENJuj+Eo68rO0HEoIAkZH3iQzuRe1m3c5tQmppMNKzAJBNLdDERpgUvblLFvonyTRlGGPchkclDy

ZtUXEVSHpoRB9uoUeMNds9d8Dpy5gi0jz/c+ecWXdm
zsy69+N8qK/dl5cksdndm2fuWSRwZYzP1HxfsCqF8ZnWfh1dO8K6Kh+pEJrmrBTLRrQ/U3OjTsSfsXay

SYxlfZ6d25TwCl6lE9pWnm64LRbZNN6f1FFnxAmkmNahtdfxjU5mqf1SkyO1fp6j7ig5i+tJo0gp/hLe

3lRsXphMkAzVKc7B9EU1ZXlFyXrdH2HYYZW1B9l8xB
NKvZKdUAVIhyNivnWlMGWszmL7V+orz7cayVzk7EON6lmxEDYTBu/6w/PodBKhKmsHTLBiCo9NdIgSMP

J9XucYpiz03xKcdNncDiAjpl7TEw1ekFisJELtyF9Oy3EOaV84DKwVhhQu/R31mpAycT49+KQGWdc0Ci

42Qs0vyANMe/d4WjBex2gRit6rwPLeMuXg+WuhfsaL
EHVA/oi5tXqyvvgAWbzT3sPkDVXCrDwiGSRevWU1rATSnOSLMTHKb0xBsYLBlRZBl2YOslmabf3Y1goL

f0zlQWaARcG70L4avvBcRf0J6FX4Kfas1AuiqyjQBpVcxUxublTF8jGyYiOnk8y+suRebNGLbL09JPU/

jZ+6rN6XK9ibhQuYDl8LrSv6NRiBfVhSPAu9A28PAV
WlD6QDUts9PdIXx6Hq5kMOK1rmBvMuKq8qyyipd0mhd8Bw7zxQz+Jobyo6cF1pWnfY9uEsFPcmUP3G2h

VbQ5s734WPN/HRegyLPUsc6VBA4kruJ/cvq5RTWb5X6KHb2yT9GlFAHuCdrI4JmhIg6Ciob/Lp4sEy9v

iY7Dtwed3vJe0Dgd/QFzwF2zySb3PZuJ2KjtNMDb0y
uKlYtnipRf1K+gCknvJAK/D+xM1AGh0V91kjLCGRdR8qnx8g4C0RzdCPdUv0R1TtYmz3OqoYTcqBuckr

ff0e9ynN8X9O7oV0/yVwH9D9eMFNX0c10CjiDJxtv4Bs448JjUaxWO73dJ1TX2+r09YNWvPUudqc3oQQ

lW8Bn+M0eLyga5Y4nmiRNWBdNTr+Tul1rdAJQNtLJv
AmZPfdmNmlMWzvIZr/i0Z6RZGLytP0c+kO7vkc1N3eyMgLThJKCbJopjRHGjPqFR2b+9HYPcck6Qp6+D

x4MG4nKsJ4d2XYO0NhK5ZMe89bH1iRSNRW00VuU90FnofIEddz8uCxEzS5RvZWev82L0wgaOFq+V9+Xo

vzkejQeEHaVcr99XhPPA2vWP2brqou6+EeYDrPe/71
i2HwC9pPqa3wCJBKERIOC8Vr7Ia5dwcGrjvpDbdHpTMhynKcnj8/KZ5//AlkRuyeAKiPPOxFuxD1bQUg

BWXIMoVYHidfIDVJ4P6AfrvdOmIcZAHXvtd4fX+Gs1vHG7OU4l6WimHxipvUODV2KpIlh/pMiw/9J2IP

78iA/SyfzjP7C2XIJ+SKPj4EEqx6q+99ygFkBJGD25
8n0RpIPNw5LFpZLOyPfO9z2Ij5hkE3XGdrhjY8W/PguOGjaBMzigxiePwwsQ/lKY9LJ0nrWYAQ6BDNn8

zB67onAdALDn6oEO2e4x+vNXd5pkrCzOl/glFN37CvkB3tq786ZL75NLvd/UlXwOHxPbKZq+jDdpZpkC

oY2z1/DBTtpDDKB0KjaC5/snD7M3aVtAofPZmTCoWG
on5jCIyc8wiQ9/yNHIALqJQbqfwES/WuPAboqfzjUmLueHmR80yb8JTBik6UIgAeWEs/jde0E7vCDsLo

AYi7Q2BpCLU+Cz1P5p6lH7cTESAjafJZo9rxU5T8ZZIpa+Bo1ZcFCC4I6Xbs7M4M8W8y5dS368ejCI+k

MKCXJPGFSXg9vpU4DffQXLEq83Q9LsZyjWh8AivIcb
xeA9Q01px75RTuEZgOPPSWOe6bBBbAn7zuquSJ0iGgRBpkcojiR/rxgu0806SjBk6uwjkp5AXLyOLLI0

g+PhQCmCgNd7rjoaA3FWUTVywhVPg4fLpLMfv/ci3Ld2rM/PTsQqamMPAjWPeOwQddDUEJFqcWZjFnl4

k6vL/HBuYJwxOFhkMbeuGP3ZWdr3QtyF7p61hnla/Y
74SDGt3T3DRxTRxPQNVakLLCcX/mUbAOqe31gX4h/RHqzr2S+isJeWR0hQjxq1Skm9I2qkABpwkpMlnE

Q1NerwtbTYjtl6QzCKFFars5zIxueAhEq8IdzZlFj8XA/wiOhDN3Fi+5wFpdC1qBrjntrkpU1+o3/Yr5

Fu2lUCX4x03T6P5tDCFpgKa6lJCaJvRb/cyBH4+Nelson
7YATV5VoVtBja5VQ7oq1LKUN08pcybMCOqU7bvV+8Gw5X6XRkjsWmIieX7B/34Y5+s1b3zKEJBVT1WA2

q5HL2/w/yubRHvgY2z17m6fm3NKe/4crS/pfNeZtb9M6mkwBKpm9nllAxawkimCp9Qf7Om6KEkZG0i+D

QtFIKsUdg8z1D694EkX8YLMuG49mzn9ejqhjBfN8w4
agoy99Fycop5bLDzork9jhTKfgk5JCt+8JOq7z8793UY52tN2Lrcc55ABTpk4cvyJvnirdtJM4NixIQz

ZGK5MiwRsd0/mmpx2fbYFOlvZprUjthjHqojsl87IWN9/9ncpgOzqaG8cwm1+HfWbF1GtfyHL3kOmJm3

5LYARWMKgCda/2PTmXS/pGfaCF7xvYhkNNnxOts0FK
7cE6GwoGJidyfBxuikjXvNkAvGmtDQ2DZYrNlv7h4hTJGqU/+N4L0JhPefGwzHKzwGMuGxUtBbiBhAp4

gaywEYSgG8wnz6aQARxQV87XgWagQ601t/lWHrmzzDnGYj/ufWH5QrmXoixsvbuC855i7hEZA8EaQzmF

xtFbfcE05lOjmoP1hES3hfz6nxwLqTO32zvOKGw2jS
1I8lDS+/So8tKLpB5LZ+xapX/85yZYyapXZCob5O22v7mcB4fx74Gt87+LOlyStpRPcjKNHw/0wJ/GeG

n4XT/8vvr6jmx1lAdbIhSUfa3pZ0PR1GPmdbXreqQsWQ2oqinCwbWMbc1JMX5BiMJZN5JY9i+eU2Qz1j

AQoJLgL8z0lec7byGIc2+G/DLoxhLKsctSag0W44Zt
Vzf61NknFyMa7xeSDYtgUD0YOUWyIm4993jW6O/NmGGJZc5IfON7ztT4Gz8ZQY/X30j5TqtjSCXM1A0n

59GjleaYwZb48drQlwrWY821e54HZToUYsu+TS58kGsLYp6rvsfoz1mZS9s2L9ZHdfNaGm4Ry1gNrLoS

Dick/GmknCCvbG6hzhChmWwRC76IQFWPm22TktMFj0N
2wT04gQYUVaNeskVFAVcP3A5Gj7wC78cXC6OGO0M9/f9yQ/Rk4Nf20U9X/E3K72Fhpt4ADb5D8cMc4mT

4BtoDGIpSlyNw5J0YpzO55mf3VNFaLNSWAKDbLGSkzqEEsh113xbrtkYd995RWjRdTybUDr+2cVMSb8+

vdwkqGwsFPs+afT2scZPoOZzvGPIa7UkboWdHwlMlc
1Mx39z70y9kPNf658QnKkgTUcuAu/TpcHHlPq/dsQOp6RwdJSTL6AfQerWwLqT57ffB1aAt61ET1sGyu

OFGfO9Ed397e3SXlSEhg7zUNv02M2NPdF+BAr3eYnMXtLYnsKWPtDMnM3SoiECHxFudX6+XUT0sE4AJs

ZTurbvQ37q4azQ2YqGrp5JhmBbHk/9m7R7VE8xyMEg
S92vnMpR+Zy2JvQQn0iwlAmmtmB3lgLx2DoxCrcSz+9SlSPvyXbjy1l+0vHdi+NG4+8OBn3syx1S5SY5

0F+L7ccGy1rWna0gTciRt3z6PDIyB1JFGD3vlvMWcPXCk5Lffh/QQpjxosC7e9f6SXq05IZ2omW56kHu

RZ+ZXAOOEu5ESs4x0VvGVLUYRimdcMKCcBDdlb6ggH
GD9dtbPBgl5KId0dWcB3KAcFDYkLtbT8DIcmRgV6WgKRH5uCignS5TpywhOGxu4y2ntYxqo02ww9jTG7

ESjgT/MjeklgvHeIdYC4I9nRsYzJL5vfy+rtyvAOOyPmy0ngIDFkqCBtmnu5WvJiV5lSt9yblgPIXTpr

3MU/b+hKfnhCZaGLG3R9zngTtRNy9K+XbhmrTBEzDi
rVtASujd8+gabLEhkIubPWfu7jyuU0Dee1hrAffLzBuNuCcb9QRBOQBhFwzeukO0fVh24m3UucZ1WbId

p48ooKjU1zqUaiQ0vM5KlmGIKjHU/nuGPxc01z7f3Bh2CrgOPQz4fzFiohj9TfeRiZpBBYjqqalUQSNq

5j0L3leUHGCUNl0ST9Q7TD1gW9z+y86oafR66UMDNr
q01/MgVadi31qtVqCoX4qrmggzYzjwXdOByfKOQxYSPBWdjJp35hxzyaUVfy80e/oVPacsd8xaC8h9dJ

7Up4h1k5HX3LBtORlUgIP1ONLGtZ+EfR8FYXIcPQ3Nzw0EI84U2M3CQ0nWVnyG0YRnizBNWR/WAs5Qtp

7MbKdX/78nJwK6EgrmcSqFyQgGwFJ+ez5l2hdl0NhW
szRdc83hHvmTPWx9gbnIVj35l/LGPVfBb4RvjN7M11VxlEm3UNgoNheXuBQppEBLWyusGrJU5gstd3zQ

+RyP5/ko7N/ZeXWZGNLNrvPX+lCQhHg5RFcYSjM3N/0JbldkYNgzSJ/PX/rcfBngUcInfMmjyMh+MU5Y

4HsNyk49KrLKmDV6u6mjVTGeJuKSFw2vcQEIjKpQyW
k6/6yFMPg95szZ8cLRF2HLF0UkpLVYETKgP1p3EJeb2kc55NhBZG02jYYdOHyf2+JUZn/qKdhfmkrmyP

eb5WIZDXtgcYeZxuUiiJMq4yFdf6MfpcQc1zclf/TP5Cii4UMp5Sr0FHYSGHKC7sXJ3sGZjZ2jzxiAQ+

+ZDa4O7i0ZKh3TyQqL/lPsq2UGK4P1A4CWWjTxsJTk
k2vTnycZ8+8AjVGRE4fA77K1YMhtenLZMFgFU0g9T/yUUV11aRa7laZzMdD+t2KoDPjVk8Cxz8PYy1bQ

opfimAfdmODeyXHc8cO8ububCHkPkIFs/knlIviCT0dzsRTm+AVtv5nkXkjuBndzQK9JAgEMP0T5bGOY

Q5KeEDYB1U81Gq/br1BqOcTWJYziz0g4gGRzZ7oljn
n2nD75Dt+8XSP40UG+nqJnusaxl4z+ZqhM4zVsxSl2qMYsz4E5+D3b65uLgnnPwuvjP/h9mVb7Evgzsi

utlGxN1K3uJf2crJkFClBvVYor+WjfPG21fQ3CasaLmiUnXTeJZo5+Y01W/DX6aWc/MYWA2JBNGR4pdq

FVu/iN0VLG6edW8A25YkENxBrPZkg4MgzB7Vs1TImo
Do1xIzzLrnfEXyrbupXIZBCuGnDT4ov88MNdpsPEzuJWxz1u7mcgxYm0UTlRfnUHs/ocg/1lfo9z/qiv

zVcXd3hEs7vddgIBLJ6uaboiAMnPpcv/AQm05wVA2qp/AEYP1sEdDReHOtKRX+FUtuF4+cdYKQadUTzR

2flCxL75yPkyN7QGzJThCG6NclOamzkgC/nPsSIEpd
SFipiteOLCfj1aDrUTxmUP6K19gFqaebbPgN/E3l1Gry7ITwOuom8PRauriiX0XlpFWV28HbRAl1YkVp

zdeXe/IGOf/CF/kRzA5Rz/8Ut33BJkJr6YVzbWIXT5cSi1v8ohHM2SG/Bi7dYdpeMZD7yDQrZdlJxTaY

c9Vvj1qgC607g+12UnBuNhwGT9Lte1yjK+L1u/KWt7
+NWiXaWkpoXrjc3nIMEdcnZlCNWkjrX5RbdfxKU/6P2saXQKyJ5w6qjsDCL9Y3m817LgPMKx2O11EkWX

obHUF6M/X7H8NzhQChj0BaTU/xnDD7v8n7EeJet8Q31BruA+1aEc6x8A3RE7s1lm9xLnBPUxoqABt25T

0coMeyevRFNIZ9d8WQgWSxPoV9JyEb59xZZmq770wE
LVtxpQ+H0BO4n3lx8JmOKcDa+FdpWwbZrNHRzIpP5eF/d/yl2fzZLmH2GOwPfNcL+UdvL+jnyl5mQwoj

N2iP67ZSHHvQNvz5KE2uapjOwsvctwn3mvRk7a/rk4gICLkQoCnAW8uP5A3FpCf98+ikNM6g4WenUAs+

LegxqNnZWhPlqFq8GjbhebCglqxsVPq/aZalw0bSSJ
zk0MvD/W45u7QLu8+RLPpDymhX/mtUgF4e58ea1R5hW24iwl6fr0Rjh+9+jrx9PZS4fe5X/PzeotIFl1

+7jMVYF+lZ5C4ZwNtRg4YaaVHU4NzHXPb0sBfFusp72ZrVFyqvCuN4ioPb/L5YGusPnt22b53M1eivv9

fsBiJ/iJAv58HXoaD0qGkHoW6QcrcPV7hLC88sW6gk
Hnhj+LYP03i57a18qAtLXzsDQrgUKFyUpty2UrDcvgZCpY96f/9k1aXDTI6gnvuP0MqrAAlt8cqJHZf5

ssxyxpl3DJLnqH6HsKugROwsbNi8dgX9lxu3OzBW0/XH9DKLWTcoiZSPNSIbZskMj///psSy5biT+xWz

PeIJlBdlM3ufY8dDaSwK6x32tqaD1lVbYU0++GtIJv
2S66nfNmvM3UYQWlKo9TY4LKy4ajAX71S40TwYqjsLZC9Uxuh23K2Q5yVq1mIEF0TBI8V2usrK0/5Z1z

PINvLPEmHAVUnYo+/QOVf926IgxdEj0l1+Q+2gfQa+ITI+VAgR5y9xKu9YSaYJCyoH168gRp701sD2Nh

pFD6As5MKuIpMQWj9DaLkZvehc2kvD5iVgu4VbjS20
KZwibjKOWr1OLtQy2ij6opnOF97OxfvtGBEcFMqvP1PGU177gcySrz4VVF2DlLpXt6bwIbHf+eDjJDXJ

n+rYL9L8wykcM8TvsE9vOKn2l0yXz62YabD1w49RorcMGb1fRRx3uRbmue48lz3OvMX5ePu8YWOUvrc7

tbJ/ibJAQna1l+qJWUxV6WqBfTGdcYVi/LYphR03hl
rMg+gi7jeZah382APoywz45P+D5/N3mtTf7ilyhoPkeBdoaJSLjMDwWi+7WbGpL8vsvVyzVpc8lD2Cz5

x9cmKQJHzScJvxF1QCBbjAddjPNkhlz4VkdJbpoe1dvZhPeVz0MaRgEUb0Fkj3sc6mGB7lnHn/T1ytSY

DwpnuAQSYbjDR1I/kG4VXz3phuzr0EFmF/rdmytsQL
lgFX5mtwC0Uc2BMtBTji3GTjBzEhDHRg2NyoH66TRvJ8bbKreIkVN9lbK5Q+gbzx6yFfGzz+KreDxIt6

HsaSJ/8/EMe9qGOp3QyBEUupzGK8aItlmmC/NxCpeVLWzLttW6tl5PDgsPCVPMBRSZQKwUzIAuPBNVjT

1032UnXr923gSb9ox/U6v/1/6v/X+2dZAKqkUaXztc
ia84NierMATTtG5rcoCj9q2A8HyeB7Ll0xkCK7e2ISqr9ATr99M9Av9PlrBahluLqZ02bg/6S7XkshYH

sfweG/7NbPSNmSjwV6/321S/Jv2WecouIyKamkWwbNUfCeA2oswNtLfVM1RsAJNchB00qrKjNk0wk7MO

ZLTsb4UyLwXwMoa8BpCBZ+7kMDn0m6cWOm7VM+VSVv
+dR3fUCphy9mcxNeYKmF9j9pNBpQFbMcP6PakVl5Owi6OmGwdOvD2wlr/+4WOLV2ueLI0EVzHrQryhHn

fZyl6e2V2VHIJC2palt4uT/hx6+0fqoLnk1iBy/u97fYHETHtZ2z85nDo7SjPUJniPFmuTVf7/t+h6n7

HJWiHpRZmLIqWahfLJ+gemayxA3sUyRYv/ZjlrFTxK
nzqdnp0EfQxtku4Pscqk9MUYrcBD/pS6EwDqtxhn9jDy/XuEIVxRL8/cAMfHBfPwIkpOnUL58OeJldA2

l9C2UnFj5z/QQsnDPLOel1Flt/rPop0ydO+QNll9ThoZCThz1g+n2mIDAw/IjGWLC+M2a+zefT7vtE5f

J6Fb3uM9Lj0RD/HmHXLe7YQv00KOmn6Utmm6LOWIdU
rB0iZHIrNkLiCuK0p1lVMGufz43SzlaE5+RilVqWLe32r6UWL3rdBrulXHqDtCsk+uwWzH1hS9ysuj4+

x0kcYXJ/3xdi/S4U3Qyq1W7N/NNy/x52Ntm4G+wCwW+xcXpi05J4Z0Nigt271y20kPdjqq4MStsNO1HQ

S43ea1oNzn+LJ40q9t08SdY4it0lUb7aPqmrBZWf6q
1HiwzZfKcjo3VnDwEhwdVllLjEgSoWLX1LqeT6rtkYhjN5R+JCAJ688y/Hs77/2dEeY1sC6laeVcm96h

l3R0emJ4NNZRK7njZz2rTzND/9DuBSeeZ4eCKxoYQrvw0NYzzOY57aC//2E0VhtwHh/vv812/+mniuTi

I9gSo+19vBBxNBe1Jwl5hD1PAO9OJlsKkhy8AAhOVz
Micki/gYqUkrKXols0teSXMbHu++aWP5bLLdZapfiaOkaUU1eKNHQ/8OhoJ89jdXP5UxJhXu8sWnX6uqsg

/eM7kaKt2t66lwMOz45eSqi+GRJZH2XvJNv2byX0WOywtDW00OqNDOntUhuJLt56k2wuH9ekTIc/21WE

5ie+5/N7m+dKUKh2xwxPPZiwT0Vu01h3nwb5eogkt6
blJmiom5XZG00zIWZH3ck0iK9CCCwo1G6mbtZlL1cU2ll4idFaiYP3aJgNTsmD5JUIBm6xq2IwL4hsGI

/qR/iPG7NPsKT9vvIEJYpR70RE3fyJjZ53z0L3+mRzFw/gcv/o+sKfsRDnalSvUT/ZHpZnjyY/Ud08Dv

AVgiEaAYCwmpDxghNmPhPtVN4S43Y1EvC0nw8YpnC2
y2xTP4/zDtIYXm4gVyfZoS5G7ifVJnloJknG116fLjErjHxHbNBhF5hZZYGpfT8vK7vaqSs3iMBBakIT

hf/CLmlWpTII+1Ch7yhLnetPKLn3nKK2UTh3IAvp1ODPL6zX0ikhK7qwMUZu3yEiREVj7w0m8CcYpTzC

1DSEgB1X0/9dMRhTGmT/8Q6cyWC0MjR3lDc9obKkRh
4ewO/MpkqNR53MffiPTAi/7tvxZ/OQLvRDXcTO4eYG561mygyekJCo0CzqBk/SDnOSNZFAU3rLndUhTB

bhLzJAbGq2JvVD66z0sZxi1TII3qWMy8DpHDKInAafXdwdi+xkdry14En4snQuXOLb4uBoF17COJQXc7

hSMh0DbqPsuzFUpXskPfRonnw9qYzJcC04zAuGlPe6
r0Mnop1j0Blo5Es/w305HkcuAqBaNWTRvfJYcCO1Cv8FeelrP5UxcYZFCa2w3XoN12HwgojHACmPsHd2

hVWW55FBv1QsiZAorRBlB8sge3/oyrh3YZNB/7eWmMI/NpMEUpbb07oAHYHRBa0gr/k364Spor5ywEcA

sKYrwz3GCFZqiHaf394Qb58Kk/wsMnMDHM5flckRwO
OPDqGV+NV9ieUugIrfCJzEPA4pponTVQZJtabvGATeklq6vHr/rgqpl2vpa8ZBFscT7V9lee2GLP1bNJ

m1dji6sZR/dI1R9z0w3itL63Ft7u2jZZ8x0im3uEdCVmeax44RaeakEF9ZN6aYkUdML5ysyAcWhYk04l

I3HP/DbjX9sU7iF2mW7+/KBRFaLbTegVZubFrEEeze
jyVdH/SJ+8SSO5Y9Sk3j3gjNPvZtt+H3LO1jNr+YGrXh0mxUaLiI+Fw14xOLJ8znjOHTe2R2pueRH+Oa

/D5JvbGFUbPSACHEZ3rZ9uVrq6D/RpkBmm/POKR0JgNkNn31TtrwGHeE9ljxbuUBQz4jpJPGeJgEiQ4D

+ne8xbYsTWGSIUHLLj6w7r0x54+E6pJfJauL1dW2r+
bbhHu5iRv0OfJUhCCyWtQM2TJn/0eaup7L/NcSYZt0QUmT5PLJSZh2T3DiSyklyrxbc+0bV3aWAwk+EZ

JEH+3lokZMD6xArZPN+OHNU6TChgn48CVJZvtrzh+ULjAwYhE3bkLN+EmLTnhhabnIckJfxjsJOcVprb

OrHR+Mwpf7cso5rUwWtGXG0KNDGu5Y35QtiBQWq+ZH
8y12sA5ZszIAmmpJyplC5DgKvE/36nV8wrjkDgN0njt49x9viGeNKW29KMMSyMzBjiA6zlezInWDTinA

tGyGanv9Ajucc39Imfr1dCeXcwTbDu1/QOppQqR9MAPuA9TF+eF8BtsRY769capPl/P19ngf13unt40g

YUOXYVDP1xpdde4DtSrbZ/zrl235qIZKtdsnoNd0Wf
DqNp4ud4LzcWSb6YKjYOOnc9wC1CvC2cHOaCL+qyS8aRcBqFaj+yYqhLusGu7MWQrW1qezyCV7xBnGkS

8dnf1+ZW6hoblaHBbdzjBOEWA5fEkzfjQfyMFp6gD7FXScdJxz8/KXTSvc+kEm7ELngbnZb/MLkCTSMn

26bsNJXNlFnY3gZaqWruHj+usP0+q+bP+71Mo2sK0L
Mv5DPOeGImp/gMMCz8W0nHuLkPFNfXX2gl+Jkm6fRP8sGMfPJTkLDP5Olhv35nHgmGZxSD8vsfZP/Ndt

Ep6/gt5NySFjgppGbKQk75TCNkj6AsuaQts1e01ia4iZhkX4GJCmLe4QjA5qFv8CLOM2ouSmJl+4sZeI

dsnxQgB0r/8kXtrOHc3RAowzJzROvY8OW/3BJFgwpF
vDGWTwU+Vuwx9iUirA+LiBN/7t65aO8hC9GhEtE6Sw5u3pK+VHPDsGWA+QS3VSb6zWPmjMt3V48+fpBF

kWAzQp1QJcMtcSLyeEgRv2mCNyeObv3zmCu0/x5BD+JYwVgkU99ypZOa+nXpnKg+nW3ydVdWs2zfgcPx

ayhI4tIpS18xx41j57ma200Jdpjo6f5FbO0epjH+Kd
6/Gbe+eMUxU+NxdxDDbLaMrvqwy8RPyg7haYLXPPVo92pyq+5wDq33qOCGRs2bOL3cSXk0k8xGshOtdX

Mkyv9QppiyWtHelm+bh9DTKSOQX4DDddVt143DCLV48Rey827uav9MVX31chQlbSngZCvFvT8+NMbytv

DybsY+/SGwAY6uQxWjGx4UQT+OqwRnWmYvlqZeVrCT
riNsxQbyKNcznFD2pum4pzgJfpyeODahIYvcfF9G8oa3kjzvXDvz0W9iDuw1/IKV2fkYrjwDWf1bBSax

K5zqmjZh9Z+UebcFFj6ouE2oZ6k+aZkFIifpXn5R9ur5PfAUX4T7M1Bzftsi5nE861UzDiI+Uo5pEN8r

FyejJ3EEqjbHSmauZWcQ3gZ+qjyRUPszDbrHni+zBe
l2NbtdXfOWOBB/ywNC+CBmXInLGrbImo6w7iqt4Mtv2JRvOzAf39wpJ5SGAoLH1kA9csrGP4qzzcl0yJ

tDxN+Qtn8hBWtjoX4OrFdeqqDwTZ2ZNNSgNO3Dd0mw30afj+GxsR6j0Jm27v58lHZdytUwWKoQuEJ23K

lZsjCiM8utmnZ8GcQscaNxh5Cpx6CN3Si5+dF3IYGq
NPotJYRrlg5S9llL3hQa+C8T10eD6IC+5TLlFAqMtqAex0cv11cmyzOSGfMXx8dMJpxu/e5JTy3gMPuw

vnX90ajePD77wnmM50GkdPFoLUEe8ymdUujojIargOxQyhF26tPj2fsr8xpjqtUIu3iz3cp/TejmgWwK

cxQyj03Jzi4CjgdmEVPpJmtnuf3BSQonwwIWTV+rzY
d7I6zU8o3PzuSN8nhl9ViOkZ/9u5mBD9Wn2nd5/Nz/lGAp4Vttqnpffmric6h6gYfxbQSt04RibLS8pk

X88Xmut7fVKBFmjkP27+O51XRpDzABVv29R8PlZ1n69ACji+AXnGw97fRjcQQA50F0GwLIdBkNDwuuMr

uT68Y1AfHCPzdrgFIxkv2HYNx8cLdD/Y39qK6kENts
yw/fY0i6jNPeaQRjUnD+gWT2rei+crESr9+jvl7jFCUUqNXOBoESzRGobayVRpzzH8xiEnCCTVxkRZdO

6AL6Fv0i1xwDklAkJ2hZRyhc20wH00JD3ELvSaD4rAE7OQj+kIjew2hMdYnxIB9iklcjLAJx6IQ3EgX1

8c+V+JRkqVzMLtTwihCJcWC7bqt6n0r6/Ksxx4gKtP
4pvZb59b39/BJ3CyqdrWLeZhvrgcwvKEXjjWvUYh1/8ButVyoFVgFGJ3nU5yN6D29DeivexxK27+pDB7

KEGhVAZmZxWvFxuYUXp/bBs8mKAdTu2yvxV7VwPOJQJqcg+Qhy1e+lsdgRIeekR05xxh/8zZeZnrSMLE

pBNcjZ8FpAsPLZYlEJOdlsT2t5Bef89ERwgrTTG3Ka
69VWIc/6Z4DIHUUKk5Lqu2lBkd6yOHYz+6GYwHXZMkDFshqPHzjt241hRfyhJoPJNZSCrGne+0bjRL1E

Hw9cM/mBPftvUcQqVA8kWd/eAfxWynbGhxKA3r36AfvX+WBik0dU4SJlkcQNp6J9mSuUKBV7ugUWi4Wb

JlvQ3n8xQ0Cj31OdAy+l6eTuVm5/8FiPoFTiRGQeeg
eES3BJY4bP8OJG5znWkoYGZgwI2fDUCk/TAfnOanYsFq2yHankD2v4X0uuEDjM8gmft6JU29P50jl85e

Pd4C5CPfI5xxsKan6atGsj1jWsJvcvXCo+/rs6bOYkOhoLjpfUIS+/3Mpsi2EdeB0rY3Bpmis7sjP77O

8TAMbeS3lLHCWPPXfUhwoel4iOdK2h5tQ76iS0Z1j2
9ZjSr9nF6r1kA/URoE73c7a+fmDempwuH1MaiP3wcI56LjhWbSfzz1g6XOXK9uVTGbDlp2Ic/rY4wxe0

lOFxSX6EGOssrGpwhvbpttR5++sQRppDS1uaSZk3rmnReE/ReGCi9c5RLfucTarAOJg1u3+azsJtJN0Z

gdz6V3zPVcYUtyddznXR1Wjracl+OUqOXwY1uC+ZRe
ZXL43O4zZa1i6d5S9vPw/TGQm680ThtbjF1BK8vj4BK8ANxnXGg9oOTaT1szapXBCy3ONEVFzOWg4dak

pAyJzC+WxuNEdv8MLPCneJv3nmtbAAHU3L8+wqlmrH/Pm2zb1JEoKTcMWlYGx6IIWmzZZa3FJkQ8gQ9Q

x49LjxVTr2/mOmlWt/21e+DGXBz3VszhupKeYjbIzy
ZStUlTO9tR6ow6L2voxodJRtr5fec4WO5IXadPQTtdDnldyUaaHVe3Pfqo3JZEYrByIH+TJaauQLrXpe

6Q7V+Ofz2y5zH3qsBi5qzHbw+IAjESdba3TdP+WH2JwfVxyTv/Og9pV5qGIjB1RNj0toSM+pmdrseqjp

u0q9K0UHF5BO+K85gRmaRNIx8h4KWWWPWlVU1D8Fmt
M1cwLqrakbrClxpP5EbaOdK3NOAEVv979SJULmFU9wBpePjjobrVxQ6VhMXCTG2GNrie+Djl/4zpEiT9

Ret2cTdchISfVuH/4wHMlXeGeuUenmVB46jzGgXnfAq3h0t39THtU6XgEr17/t3Gu3/QzpXGgL07vyG2

x2GoWmOOd+rE8yOxn8YOvbTZ4DUnDa5ze12R2JRuir
EWnTETi04ov8bl9DCfjD8h1GhPCPpOq728iTPY/hlYNmSnUPVJsudfDCKHAm54fwZNIg26NL9fLz+M0r

+ynlOFwZ4fx87VRA+4iFHLVI+H6RZeW4tHimB9ntNFfKWBkgge6oAj0kKlNVEvR7Nsw8gItkkKKHoWLA

KZon6T0lYSEK4rlz+qKhMb7y3M8kQq39CYLIsPjlbj
00ItAQRBtV3P+oWZIpGeFO0E4HwmOWvOCSpgc3Z7RaeTlD1qqIeYUJF4+bHXjlaCO9ERFZ7wvXx2lsDX

988ElZfvgkcJb4BIbwtLLfLESj5fFcYrtwKpBcJoLrBNFEFYmRm9jfBF+z7841VOijzAd3SPEwwO7vYf

kJuHHYL8bDc3V+2EFkBOtDR1TNfdvrXa24wl8SjvJU
Iluw+/5tBYQIAsgvbbXNIJ4sXOeKKvGOH2mPTvNuXxtO1zT6+zr+YNvd0CyBmb/9rjiPHxAAlE3XPyXi

0l/V1k/raaq8hHhjHhRY9LkyAl1RcizvBf7HSuK52xw0hPlXq4CK+qs1NN1j+S8u8qZXC/IATutN2DjW

GDH2b140LHvq9GSXXhuysOzf2HkGewpulQE1phEVKt
ZM9sX1nT9Mcpuvyo/GXMVe2OfAWS/St/z8tc1iJjC9zP38va9oDfxdgKXSrQD4t1xRmvVHBJeSEfB7R2

fxU6LH2sMHJuDTJKmdt4rTVyq5aE29A+8yfxer5zQPirw2GQ4y/Z9uMgb15odNvBNRThQj3Wg415jpCI

Ss4y1LBH7L2FgqT2DkTKxg0iBKtsB+B6y1bCHQUMqG
Qn7NrnHjcD5VYuV7R/mZLOqiCNuM25OE17CVpsuDkYZSIObIBpnn5Tv21QLRlRqykKmj7xFvcko309mj

xqF7ukRJ7SVy6uS7okE/5i1TedIZNkJdMsCz22yenAX61cGQ4fBGLj4VtuL51BVZu3GcZVwyB9aNGynP

UkyF52fTrDrXKpocrtptCkcaZ/0yaIprKM3ScF6HRH
kvsRZPCgpXl8CVOZletQHU4YAhE19cRbWfntVW8TeBOWKQ6aDinNzjMjdrwmahqDcdWjvb7NnD+Z7ay5

J0g5FbneZ7PQKa1ezjKA2XRcRvNcd3nNHpGNNM8FtyvXMtGJyElzN27JkLgTdSYqor4V2ZEk9KMrqPYW

5TKTUTCykuvB7kb/KhMjhRDeJcM7Bsqqw/4SS9Fz+k
HTFaJ7FnaumaFzN6aXYVOJm4lwp7d15iTE2+2oTQ1kvz8UE/4mVhbkMYKbRhgYWXYG3+eX4FZ/9XtJFU

gzCfPpZTl5rlGktgM/Fm8i+OA5VBO0wuoCOSVfITCNzCfyNKC70fNU7F0hkt/9yhv2uhHlU5I9h0xcIS

Wm9HpLC1Ez6bOBI1Yx0CNLc88c7uMdS4BtDblZCUFB
RI/NbEy75KnyjU8m5UUVB8jADPI7NJpOi/S+8k1sTo2TUfKWg5sM/Gz7QL0d7gXGnk/Zu521N3mXTSOp

ybnjY7fsTQ6ePEaj3yqcnbtDUs9lKwfVWNr9CVx0YDoPMSpaCmCBnH/eURxBKzD39V4JqwrslNykG1vc

27vedQnyZz6FxRkCqtHr7L5LkQwYY5lD3mzjgb+bpq
LstCwUpmLRcc3Lcfgw2pZFE+v44trsiVb3aeEUw+PvcKjELLddoc8gu+fKOTuu4dr817Dg5naT+UiQ38

dILVmb0YxVQRbWbC/Vr9Lhw6sJVHkV4hC/3G+gf5IRQv20yJRocJkP6OTwr2xonmOXREM/o3j4lOHYPB

aM+nn3GMhN4jFtT3PRKY/Q01Sq5dvVNenI/njaguzR
SKTMKQNoyUKqndzz0kzVhTgAthf8O++RdAW9+ocquZN1zQqT6rL1I34WN6i+WAFLQk8kaWSE/EOJ7JmV

y8/i2A3O+G58cqzoAmm8RM51jv3106M0GC4tOP8jHYqiNEHq1+y4bOvJvVsKkWlvEiGihJbB4MfQMWSn

58AEfDHErBNsejW4JP/ZetQoGuCs++NPkMsHtH7Rq0
hF2jo9pJiDGXmMpHbEOwLSBkrfA6EzygEiW9OYSuwhmT65+qamvkv6vx1tJqLakAGzeospiVp13iCjpZ

mM+Xu7DoFxP74nmCyCxSZv+X+jDHhj3Tow3RF62lWExTbScK4wcYixOAZWSozZsQ/GeDqGHLkU9IE8Wc

rAchE6swVfdQif4swkK3VNhv+NRn9yFAJJgUe7bnEH
G+NeqxyfVHauoFKl7wSTUPeadBqey1xSv+jrAczDQkb4aYrvo1O7EbuCLrVCgGxSeG9eLlryPjxGrw+Z

Gl3bFQG4kOGhIeMpfYSxglCyL3jJ8+7bYvPkmhAUgWnxVblyIdyg7UU4qMH/JkGiwYYaPz0vsBMieA7D

u0qNxb9BlqXMCrKZAmXsJLyw50TptZlt74V1egAdBA
S9cOtt2p1jP3lDpr4jzcvq2Wk0/EvUHSd/EAuGc2Vu8ez6+6gZYfzTHlM9MGfhSUaKInyy69ToWIj+mF

1YnEr87ZvBqYbMAhjhyA/HK/f1LRr1PjmkH2cutmKk8ni73Cbj3hP61qNTK3uvev3noEFlMGut0cxGB5

C4TGY2NpHBOJzCPFatwV4KUe/ROj87iOn+uRcyRSfK
RWxZ7L6kLmIDd22SbZMNBp88dHHw7K9vIvuv7gy+gkGs4aFtuK+4ouk7t8PUMime84E3cnhYk16fg21n

JXKmAORC7Ru0zDdWa2zWja9qW2odOTgYWYS2oFpFGQeu2dbgbSZYZE5p1A3KKncLRWkwOPdom25NzHao

pDYkzfB2jADHRLUj+a5xP6b3tkeLiP+VqwuWl4uKLN
CNzLOdA8WvnCH/mLEs1jz5wHIRbDgh5rsfm3Q7emeSlu/60yVQqIxi+fNkzzK5ONad2B55FhAo9+/qvw

pN8lTZJxLY+5PzqRmpd++knDisH02za2sG6ZPo1hBJgPhPP43xU1LYQB2OIpPgblNjQD/PSSxYsjlfWe

qMRsn6taR7V2ogkR4tX1DAY7R5LRLIzyt4c4G0uooL
dIiwq2dv5NkHkQztW5UvG0CyUeALxebsPv71R3wR+Gx/vxusgvqAoMAdgmKj9ufHpgQXdAiSNStyl3Gh

/OjSx4QSSRdm8JPOeVMdi77Egr+hR6M7zk8w7MwQ61yh8QHbHNeh7QyQf/Uwv4VH7e9AL6mNKmT/iDgX

ZAJhwUKPuB7uN6I/smKgvb/OxxtCdIyrTGf3yTd0NB
lQ+hXh+KZINfoyweekVBnjiCdFTcC2Id2GAoKeQx0yLl8jcqQ1ei2fueNbGxJB86hq2SGAmXP7V8nc13

c1o53HMB+VrIZoiaeyDhlaq3/qiu5eO1Ssr29rU1mIqTlXXqRvYZkAyyFhxdu/HjHtUeJTidMnlqko0b

f+Kr2XhitCmyAJVnSVYscMVfPmMjbry6vQHnJKwVQf
JeDaPatvdskE5Qu/toaEaYJnYHGZx3qsiOwcElUc2U3FavoXpiQXR/W20ta+K2OyJI42I/DalYCJRQe9

QbfWgec3xv69JGacgMhVhaGV6oHlT5zQpdSX5ZhGLlKqZ3aJ6CNklKNuFDfiGJvHLcvTtUGpodtVY8+o

8jyhEmjkCKFy8UojHVbgeRWUj3v7rjW1kV7fm5IDt2
5dze6b9rwwNKf3xVB0n9f9TT66qVmgKzqJL2WhmE9hWnRUXp+wCi2nLYU3daYdP3ZupmgamXEgLfsjQP

OpZCDdOI+eSjnlDVRTiPKBvwfCNsI/55jgVQD7Huf4oPgvamQB6wzE69OmCO5HUzMJBJb+5CuaTNPQJG

ZqSjeFxpriFHiQunAisQ9iSjGz/px00IDNZkwazbRJ
1nSpjkK8OuakA2WCY9ZBFUIwctmhKOgz/JLlCXKs+P7WAVk1xPRezAFSUzHk92NCl7BPHAH31MnO8Q7c

zsYGDtdeK2sx7EN/q85qmr8JNHM941kSixSewXF/IHdIK7yziNKztjg7s6OrWLw3QlY6qJCLDfaq8dQp

masYnOuMTz91ObiGAR9Kymp/O+qZHGK88sxa/x42eK
/3BcV5l2rnDWG0ah7OZ0eVBkZ46Oq5v3lSTjj0eNHYFrgS7fu2FyJyBGC0vVuWEo0CFuXeLbQ9by77Gd

HYNOk3flxN6AD1RYLuiM/lPMLcD2o2Us8TX2lZusKTZIh95kjpXsasCH5dfcaIIpcfcVaqHsVZ6tzY3H

4rlMHbRR4TQU5W7/H64IR5VMiaGMMyJ0cH5mHPtPW3
VHghdwPuiselUhKwVP3Sie7zGi5XDbCeaOgR0iq8fXpXi/iSF5kSpNxsVqkveycrSDY+lXjvPrTWecfT

f9viHi0zQDKKPtJnIP168feA4Zm2H4NEFo/s4LC7pN+UfHbMvKPc77MOd/Gv7ItHKVHk30MsWAgg27+1

pT/Pz6O/zQWXkRAvELoSno60CXI7GoQc9qrbrtKlgh
p8HeBJjc6KIz3eLy2VSLlEzxPlvGtHoLUeU7B0/h+m3mdasEnS8F0+gDNR3KQU4a5bZVYmaOnZLVTpES

vSEfZP9ePlZs+ISO1UWpi4fNlSvK15uvzjCp1B3YFrEcWEe18Cl1L2JhJILZtLFC4BgjLe5eYp7SgVfl

FqJkTAtY3I5Q4Z0Fp2XCa0tkqLKD0Nql08p77VxSmg
ZFmryQOqwonlcVG/XOM+5be5dYmKYLYlmFxXD5owiRoMaQXZ1+byCFwYw070dAQ4WFU9tWV6T5bRL9JB

QheHYyhUZ35w50Cbj/bMIEGEiPj0TXDbJ8/upiWITvuv/Ts5Sov2jmpGEw9B62lmAyJhIWQLwdgCZ1z+

dJn5q/183+FB/yVq9lCiBnIfxjiGWiCk7yHx/u0V3i
gPay2ClqG6ZIj+L7Ra6J2PRAJqlKXSHVhE332M8NejQlqZeXWJeoWlTvin/E38y/ncFQWOOIbmfbaQc6

ESzY+Dr4BfeizsV2xb1T7HZJ6eQyYLNBUaqFghcr/4ecaqBIUCUP7qHUDRX9MmDoS9T6IM0HtYnpQrX+

hmqv5Vbrux4+EpV71D4hpmWIFmuXpllMsYV6upe6ON
8Hu3qhbn0Lk0I1vbnQuoV4zu4FGfJU0tloGrRa+EGKQ9kc1KP1aHWRBBsQoPrWkY7CaHTY8xl0A8t4zZ

5xj4DbYD74mrngCOj0L0JW8gl/BAyk2niw2D42L377HR2Mmsck1x7t8BkNux9/vMRRncNtapc3SIFzr/

YqFyYgoQX50JKNLc5KmdkLb61FEsY8pgv8xbNRrqCB
X6vhqU//cYYwmx1yVIPnaUpuBc9D4NHkk24NUAQBik1naa96t9AlwaXhaU9Zy5XKB7vEIOSJ/Iodv7rA

9Lou7P6OiY2ItcgP7HqDKZ5l3xdO2VBf+n5yhnfmjYpCECmF+Eo6oyNdhM804OeYFqO83SzuvhSzxR2i

soU5szmTBEkjkK0G2M006dtiY8rh9fRCKskxrrfuAF
p3G0H3Xv0r2AOujuubGBE2zlhEuCJtjLDKr0qwyK0/JE77xn/urFpH5+qGZAbRCSiNNgUssGGCPb4G38

CJaod0vSLKcd4Cnrp2vqQaoUaIoSysZ4tCyrDnayIAVrxuLAM6GAUN0tf2lS8f+vBa1WhteAtUX4nq46

0xsTXw2gfvXpQmGrBv489CTNKUFgDitxFr1/ptmPc1
b1qPbbgkTAme1oGke81+EspvyMYk1peGn9W2bkPAlSOb0eWrGh/fm4MUtUdX7n9Nz5SiGuQS5Ew3aani

zJViO1hqSjfdsYcSZOvw/PA/1jxNM4a0sXY6Q6ir5Gkkcjt4UpDWP5jGxa4ftNv/S/aBrbr43We3YW/6

/BaXAlRI9fd5WiTCMp/DOwlSKbVvmJ5+pwzJkmfIdX
L60a85QjIPpYqY9MHV7zhZE3NwkAw3jphANMrPCkpagE07z+f8GmPNhkQHgg+uadaT0huo6g94gotkh2

SFdxTHuCh3VWjFZnyW42M/7dra0xRNTNZwswBh7G3Gqw1o1Op6lu6GId434qWsNa0vd6kmf1bEvtV4uR

65MO6ZuzzLdA1tQXRTtRIwRw2Ym7yD9n/WeVO5s4Q/
wdq0DitdYV+6OPmALxyDysgnNVuSmB6jjgSgmjtLYf0gmZ1TEXJjVh+oGmOqWPFTrJqH6cQqI7OB1JWI

kd+CaBcsnj9lvjejIv+mxwF4lkXMOi3gdQ0Oy7+atx0W4Lll7Eu+nv1CTwqJfVyYKZyKmwLRuLOyIjPM

k/Fg0c3ya+64YgkKR+mX7mkVDi3W9WSXjLOuaYGlaz
GpqkY7xGdha1sETHz8SZF/0a+xYFAgZBjGamcsALwvEKiyBwZUX3TDYvbndBobDarVWACLIBdUc+dRe1

zk6hp6xRMI8Nx00+Y4RMTlzDexps4ef1s6vT4AtDnpDM+33KAU+dno5mKlgPqmixBwEtBDQ88nn0+JEq

cLF7QhJp5icXy542vAIzoqAaU0VXy01QyFPY/UYfG1
o/UHSJCoN6leKT9xqt8eKw2XccSjrC1cMN7kuFqTykst4hdMwcTyC+MSWAFdVsJYV+K0VK8DWPl41Sfs

xrGjZ4Relw5VUzIM7FPmcgThOup4BESrVILKDlk1SDkdGv79l494R4kE0EHkH2RkYDuvp/NpCjKbxJvc

Sh6IzQt0hmEyaYvsdhEMpY7d8zJ7F/ZaGdc3aRBaGD
sPqyE1TwYF2rmddI+1ifbqjA2ACzh2YE2U+xcL23Haq4+5CR0fp0Wlt1keb174x6kGAh4F0LXwTO/46a

/Oc/krfoAAfyN/6/ewfa1f+N7Clw0SO2rRnyJ9fnEF3GSq1rCNukn+hAJJLqZvjT7YAF7afFqL3cjYSg

LbHyuOVePx3Beg/qFSKJB37hz1hRKxY7E+6DdT8JrF
MQXibf2FNY6qyXHticvvQOnm4Eyp+1GQ9VPYfJPks/RkMc3QMcSrV+BuZyxFHl30l1oJ0/tDuozWh2oM

+yZ4rZoqaJljzU8eAJx5q5GSV+Ox1WPTjMorjRCvZjjogz32rnGToZzjGrm234J40rXLgwAQ1kdqffp4

5uF0LLAEQu5xrXVKFffaaQnE4oIyzl8w6/U8Wj2KAR
MrlnedWaxwNkU7xWgDIbimW5c52jZAtwl+Jshy/DNwO6WETRZGm8CwVKO2IMkaUXy+1nG4on7I9mwk+G

pMqHO77XInS3F+2VWI2okk1K/a2+blDIRbLxwU7WAl5mATAK8Uu+NljVQJRBpwFki6FMTPXsaCHrWIEr

OcY/DJqF1l92gAO1T4BUmnpFO0ifzpvD2BxrftNXbs
4hkXJ2GXEpGJhjl7JPx99WnT8a2QFYAZwC89++oKcOlw1/qSWu32jNqeObT8Xe+SVz0NvsR7ixFnewjI

wlIANNRN8yJkd2+vCpcuRjRVUp+yrMXrI/D/lr+44pS0tzeBv1oKuFMOkPK8qWeGvF9D5ry/g//pIQBf

fNcYJgC0xpKbEhD7LHocPAarOJsRkiQVqnGAEltLid
qG97ZGr6ftYyiIUf68TlRgRfwPpQFCW65QHS7fiUxFLYjQQmtyRmipoWKQoqy2/2RUMOvhRqhcA2UigJ

tTR90ekfv2azPVrD9KEMFWge+aqTGIHo8UrDrQdmj5vfwYvQSCc0gAHhhcLnfLuYZ+1hVvJ+Y8ArjkQ1

Ti5h5CyAJycqQiIBH4iaF8KTJuKtmiRrBNjQZ2t0Ux
FWHRbkipftTy8LZV45w3Drz+QxpjZUqtaYHMOt31fV+ND3FGUizfDyW4BK+ssVbvbfV7cceq3UYIHEeF

6HE7OUVCCztlNfNTWHniZY6uatbhA16tyMXyodgWGjPIEeIlJI6CG+6ISDud9mM0b07/R5KkcQlMH4Kb

+tFMMnhtN3Yoxw2sRJO+4iYDLQhgCPPbob7IA+D+R+
HUlxI7klDk/p8R7lupdVABivj27TXLGy1U8YEKZuP3ic+JuEAfFtQBwy+B35QwhVDPzYEKI6Q1XA46nU

4NKEVOSPj7nwXtzmnxdqRQbfdgeFU6wi+/KIQ3uTSaig+yBaDV18JNtUvL3s2gl91suoY2toNOg4jcf/

HQXpWB/ax3+XVC2xcRuQTI7TU04X4+aCQ4MGVjbH9q
qg5GtIK54iWZYf2JzbX/yFlsHcnH8oDIX+zmSGr+zy8jW1gYIc8ScNhX6AzkWjvv1jinx/ahUjW8+T2/

twHZUKy9i4CcyR7QsRbeYsU1dfsdRoPm9C7UGG2FnpibV+WW8xdrccS38gTSzRBtvf2VbFdOAboOxtY/

FyC2N4Bp+ZFtXQqq9XB1/6rHPDjGwtVVju53EKPcqX
qsBYtpnxnwi0+GmBhjyPdrwJV92UyKlsjLdGIN5HxCU0ygvuCN+i/53TJ/5o4sCt2YpNa9nzeEZM8mwd

S5Oi/g1574ybK3s0o98sh4uAR/Mxb4Z0zoc+DzdepAMX+oOTQiHlVK3F7n0h4/VOkKxAXvfTjqEe7M7p

gkq6aoB6byIPrVLc70fw7bgs/caspWOVgm77R0kKEK
EPSh0bameili2zr4Q2zsSbhNlg6BXqvRFDcny8YWFeBP1LrMAxisqyePUf301cESGBxu2d+5ORj1ocwT

9gCVTsyAXu8Wl8dAN2mcwv5edMZ3+TqecLZkm+7zUBOxwEeH1z50Hmx3fIByO8y7Sbn3Rgmn/IPU8WkM

p/mJBJx1inURDhbK0q3Dhdmo1R8yvQuxZHSPDew68O
oK+E3cFp9CR0xS3jXtrBvYfj4FRYwu+PvHDm+MKXXIZyAL4KQN8l3r6p6p9b4TxEAf3+xY0AbI1pHYoa

lOm6HJQnuzpI5I1GIHKN6cFdTgdvRkvuU1BXde0hrWpsJmnSiKGT3n1/+dq26yJurqwfJNow9iW/d+9r

Zc98ow07sYwna9rwU76PXkm4VdJrnGA6dOeH7hvluI
um0CCtSKkvVBRc/g4BiFecYTgO+JQt1AGtn/N2LH7kxfI04Bj2V9WNYzbL/k/5INxo17ngjsCjV5KS1N

MI54zbs/OKp4uapu/QZZja+pmno6QLswBjKmwlPWxIJic+iwerBSE0AFt4L+WKJO/i6ME1rYVuH/50RL

X2/sRWCL1CTm26cfLgK/GaoUl/9qzB2zuvP2IipaX1
WrOKYpMRkD5no9vmkY05+aUgAkmZ4nShB7QDSbDOxljsBDiNFsr46jzoRs9At0aJLsBqhV/mrCP0L70L

d74b2mw3x6xUFwf5HB2b76LLFlrb5E+BIdq2ayHWYu1+jNWoNXft5aCBUovBwTMQ4LpSiWUOm8e2whot

hm38yMuTh02dKRKqgghjSkv6NecbyuQxbBuu26rsOK
4mk27AdO21CoQ+urb0bba2BIfxGnYGND0+HvL/EnfQQp1ombnWwtgo1Wdn9q4Qx3l7JAM6ntn78VguKj

6o515sxc/tuzDcQ8Rs2l80t9sQ0EX11mU2UDgnqwynDgVFnmT8C/HNB5yFVQIU1/7bBnkj0fkPT/gbPc

1JHYQNeJ4BJAQWprQJ4YdHgyF3TGFBNSRV8PglwTmx
ubNsWO7MYcWzsJlTt9a5L34RYX3RB+qIHBq8lC88PqiPftsislTNVFliYiJOevjZYeb/N6sq+lj75E9j

k6+8Iu01t4A05X5A0wwRlhlku56gq9K/ie9LT1K2p4k+Egyx+ZAozto7nK/O8/Kj68kIg9pcTkU97c0F

auIq6STTsSO/jgZnkkaX6bHWKga54eQWdXZC4vrqzZ
+6HRiaUMGwdcrE10I7vj5lH2oRvLggXkeSsR7muoMpdKjJiUGkTTfobs+yyqqgvLsiwFGV6frZ2GtCTP

yt30QNeFK6uiZJEDrdqOIqC0zwY+e/YP8vHWkhIbOG+WEFDcqE788sCy3Xdged6RBeme2JdN6aebTjqS

07GI/HBt/8bIKKuC0T1+ESi7VorH8KGJ8turf5wgm6
fjLhErZvnB8EmllrQsRu7zkNGN9jg+P0DyTJiIr7emFYb3JOekJzLIztxg6FiNZOtajdoX5yoOOgKEpI

YyOksTLPySo4bAPPLbD2a4YnrG78KZwxkMFY2QuF1HCN40zK2j7u27miiuy+kQAJXmjtCWqVpV6HVpYE

Ev6cwckwRgbWFMXwJ1BrVR10K6NKBDfvhdRfGYtMka
I3Qgvn4OBPK4MEVu5umKUlauZjkvDe6s3ABqbVMATu655qlrHA9Wm473a8x1snLdYeO//YZweB80h9wC

5FAijcd0feiAs11pItepTRBsFsqu+lwPqfYnE6Rw49zJjfzDiqVgaz9p7Kxx/w6UlBU8rXoFXDtNReI1

S11Fq6MOGf4QbPJfVyM6JyBbDqksgYyyDQ5NnDqTr+
Sjjn8PqQSygPPeqIdrnWiuVPysJGfy/CcLriXxUcT/5miHQTynvQE2vhllhgobz/799XQp//PvD9gKeI

SBTIcP5dNY+oSrYC3C9HuaASehkJkgbXvoa9UDP+5KiEO+L0KwO/HuPc9iGCJ+AC8+ONjv8YEcsEQF+x

DFHsaWzmD4y5ZpZOFljcmB8o00VxWwM+r8M7PLGovj
/i2B5Wmv7FPktv58z2BX46OrAfamYyazhGNckIail1L/myUnSjH4p4ZbEqssGIjRoXbCIRzcGlbnRSgr

NG3wBH2YExp9PcQ//ZV900VIItYB6YEqAM/5GeBuPG4+5YLcPR7bD6xd4Tqr0Z/rIV2mVzHPLg45mQMe

lAc0A4TTlVOw53LDeuth5chLg0SWG8aEbC4KbU+qp7
fazi+jo9zdFZRMV1hWabCFRVUPslsapnsYj86n9At4qj7dcGu741Z6nA10JISkScBMPXaveLoW+NntWh

5XD/UIkEaJUlw8vo7MKpMbg/RbVv/4jFzn/ljx2xkl5OuL1+oRYplJyRMeKyNVVuALtStb6aSEttAWE0

g3/yFaH8UNtsfaCGPxpOM0arn0WLMDmcdpMCJra7U0
PdzjO8Kyd6r+ePcl6Ea4AJpzbHZASQ8fx1yJsV0uahWpBgr7vn3QAmSqyi8ahcseRzMZ8FqRFdG0z+tv

Bv+ki/zqkSaeAB+6q9DQAWZn/mmC27GvewIl8sTndnTtc9fnIn4WqVEDUIVl14BgsjuixSPUzh7aIT+Y

oGnMYdbmnZjZnLckEfzmZ6e7vTRG0W2kw18UoQcYCp
cLzPLWj4+MELkAvXMvOWf8nm+ah2FdhRY41JxqHYXGhNB7T/24sramXcJJ1MEikogU7wzI6gvjaSPAAG

YPykRk8mcLR0ykEOMCTyIkkc4CWPBfh3lBDSMoohq86qOXzdcllIiwzH4o2KS3d8j1BnDrW0i+T3bji2

06pQ4OWxu4f47CkUFwhwyeyYc7HSCyxPiVTQYAaYXi
eVrhhC9uouoLoh9v8iHqQ99yaX7LL9mwdpxHmAuF+ty2gBUmMy17mMj1fUpjQGhj0gxvJulg+8RCeNWe

wQZf+GmYXirB8XXF6EH/iCcFwX+JEHgu1nV9yDlEfyZf6LNzaZnvv0AhrvRr/toW46aTOfwauVMbbkPw

NnIjkEdMNr4jzWyJfOj5uHi8qzfvda0m9EhcSDkSHQ
uAHOl3GdHKxIhLYk4jy2SC2vX16q+g9FXb8azG8ylhE6ZHUUCuT+o8L7P92Cb7xSLybevOnavVyicVp3

e+5YihH1ecQaN+7qjPPDeZtTY/URXvM4MC0ss9KVsAhIETBS54EA0upXZ9FJddggRvQY4IHBf4r8TZ56

p036WfWEgVhh7HKWZIO9pwckS9IIa42bbHl/s0DG5x
rvH7yX7LwJ0cQE1Ugb7Wz/onPfinPZFy0XRJsQvz/8AVH+NLLU8Ik2lhgkgsSuOk1NC2aiWyOZwzUEp5

4/6Q6vcoTRfWP/DOu1vgaJjoSU/2BSZBtvawbOR6TjBg9YWzYh1VNpOQphXn6TSq0FqAddyjOiA+syJo

j7XzHE8YArQMrI44A33VrU1BVjwnOLFIkQCxZ/iUiE
vG/PM7IyPDJREyaHN70zudHONkXS0U3A3HEgzaMP+3KR3n6+sb/G9iLrF3954qGJCdi2gJfShFTqWokV

ZB16u7qjv1dp+P0yNaZ1SMjH0tZkVGA3G/EAMkmaeJImcGoK291PX+6uAHNuDaPh9qkss7/3Y2ZV05pA

LhTCdwpKZPM3BV8Ey7MCL/35L6pdDneXDQgHmfn9kj
psEij9kwmevECUF+ShJLvo7J3XpQ/zbtjusQ5R9a+fMdfce8wgJ1Oyi47Z1737I5IG8hb38blnBdGH1y

iLkiwylrALG2XmZYxO3vqxt11O1xrDukQoiXivbtWQNlwscoasekd35UrHZ0gFPH19oXEL8ZzR68ffqH

GRC+HGdMj73i6+92G0OFFWcSIlroivPP35qHVKFYdc
fnjkgFejUuolFY8ApYge2UKqgemWVmLVMifsNRIFWhNXeoaC+/Yqz5IHmxNLy4eJdB8QF3ivZz9bClns

1Hnr166xOPBX5VD0u9pLPY3ufpGQauSPNVDswy1YfzdTo558ByLwOgo3/Diq3jA4i9vZqtlCRzubaEG4

UxlR+XqhRAGQtBDIFVMzSm7XtJDaam+yK66KzgLjjG
6bM08q1WzBrYCuCMEdDVknotTUe3Pwm20TpSQQz4zAwFfdckI+OcebMUf2Ifvy4k/mp5/f0II/t+MDaO

mLOSvbDmhyRPUS41y45n746szz37Z9ERTYDoa9fAak93QinEQxwPR+AF6r/rdxGj592Te3rcnQS/85cm

krdZMCDy/veSnqUT0b3AJdh56lTXtzRTSCKsfiSCA3
Owii12jhETWC34tLgflxPNhx/J+VE68Dxwxo6iY7O4xl/o4ZkhHQZg1XzpG2es19o9fwoQDbx8n/PX6L

C6lh57khsTFLnkKLWeVQ2dBtnpmF8NyODmSnEeq3cG4UQj9gJTaKS1OK0o7LtSTAVV5o9HL9Wh+IST8G

dsP/nPYB4tG9J1+t1eVO8TmYIUg9u/9SndpIv2IChf
YsTm8jiuPy5W3rSHSnXvsFikW7PlilvS4lO03yTmkHAH8jEDGhPrS0jEx0/5FoMdMXNVNNxN3pDHA4Dg

y0fUqVx6G3NmNMgWJ7QQipu1bnzuviixkoDX+AbhU6G9b587FkEVD2Hwno2CWTRsGWTfJzZIn+aJg/3b

KsmqEXzqlIicnuoEr07vqMyYhYz57x4uONn3Gu/KhW
UN2hY+HWDMQWjmYw0J6AQBHjTwxS69zfvmfARp5X3etlgutW9EXMWeltrsqFIVS9ci6g+8NbklheyDJ1

4EbO3WVtFX1cLNAew/gtXxoM6xABO5uuxGC2qomRsgt28vsyTZvWZkN0YscRzqj1y8ZG5TKfYIDonisS

i3pmZB7/d/46iHYHf+TpS+6rAAoPnwdj2WTdr9Kgyw
8OpT7v8/oNckbQ79CTPomMR09a87T9r/d9Unz5dZzbBcHDwe4dnFqVg4yG4mOiOQdHQGrzF6aPXq/btj

9lL/1SpFpwvmcbSKwwTH+rc/79kK0n8qU6tK9TNdyKqwHqJ0tevhr3vEBnPMZzGp62fUFe/GBqswKXxX

l1WJDrs2JmUoStp77M4VLNcPKtIp/nNkiL1xbO/lZi
boX3F58F+c8I8gqz0nWdR6mw9t9fyGRydGsXP3rWetg2cYJZZ4iC6Pp00CYUle9pceQ2hGgl2+Uqu03s

Y8d5D0ZtMaP5/CyA3JqQ5Nit7hrY46QvkbfmxA2Dc81nB/zWmeDjZ85jmmAEz/serrkH5pj3m6eTAH0I

Q/OPwLn/ypTzb/ar/VcK6cZj9auRgohI6+wMfMe96M
xXu1fMzy/virjKDhhP6NCV7+Jj33lV/dBqK5LgSxyAdpxmtw9gGCuy7ge/OW4vas9DLJRarEmmyrOcMX

0zMzmLrmpHdioYyWD1JB+eX3TF+yWiC21YZPnqhEw1jWz1UmgwGbNN9Bs2qyfcUutDjcCBtlvbtgKYHK

eL2LMMXNdvc1GOu4th06ueoP5BbXBK3IN5SO1YSgfe
/rcTSfqJ8Mcmz3GkUdzW116xlslWSq0BYtmr0kvAhV7a0lhfpfCFgRuA0lwHF1PI080cJ+JZmyKg5zVm

+3dkUz7ZI0x0C6dzvdTGCz6B+KRIV1dbibQ8WB38aMDeSFyj0W/7duSc7RsOURzXQ4gQLOK9/2XjiFUv

ueuqp4hydSoy31b/bvfmt6Bk3H10hzGsSgGf76fgoa
2fata6jLLIfYxLo1fYWEV/CCqt04pGZpsPd4GRzRFDdeyqq+u2BeJcih6VXOqDdwWOzXs6R57azLLY2d

yRzN0pwmzsSmpY2i5KSm0jFkRwHsgwk8SGpPLiF7wt11e/bO6fIEppLpPHeOA94ptxWcNKFnPu/Vi0xc

+aQJCATqxx5r/7SFVKgpOle8/QwIx512sVind7V7Z8
DznWQVEJyAEP5C4zE18wQmcMOJa0pqDfSTU3jYiuUyfb1BW9kbaaerxY97654XJ4ezmz/Fz29SusdIUP

Mved+71f5B5xFtiuGG82CZKjyY+IvZb2se/S9sBpOkdklkpMWxdn8hy7FAA5pDsasXLbVu3Lq/Y3MwJu

sMo3CwAx77c6Y3/k/8dJ1TXHPqldw8HthqDR0s9vBW
O0+8ApI2kT2zo8+NaAS9DLwhdOSBsKJHjgSeA+tqQvb2ouTS2rfknj9QNo/uY1yC/uE0nLP5EZfNNM/e

78/+ndG9U4dT9i5PNpuWgnfFyJkaVqL/3RFu0W8kvVCQmAKuB3g2OHR/XX+Rsy9PR+G0TexTiG25akDN

96PmYxml5BWP0bq3QVQkWZzdx2lAeCZRUjaVtpuTKt
mtwGYt7Nh8w+w0rnmcTLvpW7FqG/+WS4VWtYyvoC9acD+1l1xdhdqXg4oC5+GUG8k2QBuRmCcbZzu37Q

v2ElBi+8bi6uRn0UPDNZXp0aCHHwFNSKFGtbTdXPcJKteHzViB0I8HEozIne7mFI27XKWHXlM8jXW2oa

LP2bqe4TrYwCi2/4QLbG9x1z6JwHfhLgwOVT43K9oL
VUzCLmx9IZm8SYjXCLCICM/ZwvyCpAc9WLUQX18BYsDikS/pA7Mwu9ZFdwkAv0JmtBfL0elzsceIo8EM

gQqdgvjrzWooI2Po2paxVxrj40eleL7Ao8dzFAeO/l2iInZyCKaQmPdK24uMxrSZwR4dYZtgP95+8sEY

HFl3v7sHtx2qxlHwDQ4DYbu3qV5ptSJ5hbFl/YdfVO
qgGxBJjKeX//qibzl04HWL+wYcXAmZzkIgQ4vVHnnOQnHhacEs5d6EHbdSS9V3MfGl1sr4U/5VR3oGtn

xWo1KoWAV2pLID4O7wdM/79t48vNg2coRJ0u+yIIpgxBfO4N0qkeQBnYdrUcImQkrDRbG5UixJjeZFnZ

K7aFV0a6/XWfVm9rn56HkHwC6kyAXFg1v2Uieir+W8
s7wZQwXcizQ8hK0/1a4GCM0K95bgQaWIv0RdY34wEJTo/IxBCUBOh0E+NwBXQ+5ii7kTRb1pzP0HLiKw

/QdlJ0CKhQWE1IS5dL2BBC+JQtqYowfgoeQkAJGPUgCxIkInwhuMrOKaG4clpeTe949sTEt7DqRtzJ+y

063ZdvHrO892qO2v0aXdjlV9CrWza39qNFivxHdfih
2Hb+f4siJX7nU2vxksKqlq63sviFF4c6btxeCX2YOEoI/tKIOx/M6Ipg9ex13+YAiXUyh4ED6+Vs7UK2

isYZ+qjqdGTmZ8t+DcEJl4OdZya5VeLbJgrG2FtlX/fAzKDmbjwMTF97dJNo0UvOVZNeFEqxEGIEB7bd

jmGWvv0OetEnhPyzrTljZpC02oXwrod8zj8nYdRCf6
Et0tR9zft45Xm/UZ+v+8b05p/UmQrpFC0yQPUvDPTOFlOZv8oqTdxTwEyTke0APeRMYdDPPQsxQcBc5g

O4MBIjxfiUn4cNsZdnEx3OaeZ0nxQUdpsdyW6FV6y3qUywghcYA3c4B0Q525HU4lxixpl/9Hjm8IH+6R

dkjUAzPTdhSmmrLhGMYFk2L984Ogw0LOsOlei9AUp/
aRPDOs3WIonW4ZhnMAHqm0L8xRevmOOW/48NyZuZT0gLvTda+ZJ2uGc2XJ7HEIKyPndJtPmDCqo8etBG

m2BjjQepSrGQd+2swiwFMPzgfA8//r4CYOcWvg/htcxm49B9Icxc0/usxQdygEuJqA9WKhRbFLpNImtd

5vnHEt34u0bIJ9TeZMcfZD+xrHaDutPEkNsud9dRNB
iqozXb+yGOeNfTQHYqeMoDbP7gcE5Q6JkX8lcv2/G8LW9c6cZF+3ketVEWpBIEwifl4ncX8r94oo6VGb

5dLyvLUtX+OcHm+t4ipoeuJNgYQhKjdECLAFgTb3eYC3D3QZKGsV9ol+nNKmzwLE/D0WEVtyOpdJ1Gc2

4PyZx8ohS7MiwjuykzhT9L2wG7xDBNFryNVwa0h+bG
+o/GlYpipRcwcHIBuzk6Anb0FC1hsXibAc2eF943Aoob42S9Pg1eCksOsG89B6TYbfvkI4zQtAT42BHu

xBulQreS4oXJ7qBrFJ9VRXCMCUSldf82lBy9tkkK7JD+n4xv7CcSUz63SI6ZJlioH7VwkgIiUfnHrjEn

rKuYc0PHJPfkPsq04okfWn0WGT4/vWDoUxnHc5GjtF
ghb6q8XTQuQvgf7Jv1Oi/3cxDKo18PgcBOLdBcPOuELVvx8AoPn/YCt2tUb9dKqUWUkhMrq2+8Jys/1d

PgMPJGpIxvWjSL52PWpnbxLVd0g27/cHUghjnCXD43XvuNgXpj99buzQXdVCZ3apZvtJy/F84o8rYCDU

izk3po6i02Gl10q6wdCKQY3gupl3DcmpTmGR4nn2wm
WRPPfuIe2Zsx4AQgN26j8OEMvxxYVWOiFt6+TCrFXTH28GXOUky7cWTi1artIulsTYXCIQ3TsTi564xL

6rGZ6eP/xP94T/koLW50x7KUq060wUnAPfOY54DWAh2DnvdbGmt8VAVyA4B3NhdXPgfO1cpKx0PZTz2K

SxdQbL9HHsw5TPVUMHKq1M/lI8S1YYcv7yh+jAjlka
fdkDdBmzBj4WCQ/AKmpZsHOONV0TjFVqulkm8Eq0xEfCHXivWVdAmU/qz4yvDlHA12suuttWJBpW1PbF

RRbKixoDoS4KSn+xrOV1t+qDmeC29NxgOkyzjgyRonNZhQHyFpNKIY70TxO2dn1vyc4YnGFvTnn8XK+m

6dk3LaIfyT/U4VHkfakrclZ+Gtr3I6fi/NlW2qLK9F
9VTt+ZmY2sZ5W4FM6QWLIxcp93AnN6DzZOYM7xZuUpbWc1VsfoIRfkxu7mE110fwGxd5tz9zF7FsMdNy

tmJ21ufrifwwstJmrxb8KvHLzxxwE4Qq04ylek2Gi4GHgz902fzRrnIe39tbj5lNiD5wU51fRyGbRBeI

z+RfXsANehy8Q7sk2Et8Z45HFFOgc+abAKk7cmW4/l
ub1wASfQDmp82x6nXvhV1r7/Dtyma4yJ900Vr+/IfgOorDuGPqy9/XDfhUa6kiq+YxNG08ljNtnxsNVb

uPOVyEFZy3i6oZE7QO+OY+d116A0Ok29jgvpNs70fhx4NouTTnt9QHkjKCWr7zuPs9+r2/RS+uYoOkCJ

MUcw+CZxkjZfzqnitp4EgT9eKNaJQFSG/ZMl+55lIU
aUjdVtoPBvDra/0pbjc57i5JC5PzH260HTxxOLGlvL9zMQXiwMK+RQO3Pko+23AWjtbG54oneMYgF41T

ObArEVuVegR8XLdbaJsCjkowqYHkV9+JcDiE1S0SfDoU+QA1xbdEAvG0Mp4NQK7k7urIka4nmPPmc5PH

yTWs2ugh5a1tIZTed3ZRelFcgwk02E7unjgjfmscan
L4o/9YPaqOkjjDhisZQlDmxik30KMvypoVyKjxV+xd+Tf6MsZ1YHE5n6kpMYjdBwHq8jElFpRM+MEtcn

z1EKX5CY424iT0ggUbB7pNSlEIVBRLmGnNmEc4XC0V0LXGUkx8je7Fuo96Vs+XPtcvr75I3/KXQa0C+D

+WunVgNtMW3jb8u8e4VIm8DUj3V/JWOy3WTjPPgZ0s
pRj30Le3E+agWKUpMzfSLEdJ5mST62WKM8RmhD5Bcki284SSRRyE/YRya2SjKyzboiKHy7+ZTgvHkAea

lgnmFcniCF9f0qcn3rnVcAHeX7dKGjAcHaOZQGo3hE6QHTYPzbPY1klBEiIH8/if7CSYnBzWqKOvAHF2

Qqcd3ULZdauKPd5ONKY7QeYmXUX1hyozZt3KFeBr7e
xxceJuCAMa1jU1dEloY8NVTuJ/sxdr8AShyqFVqfIVIuxwKZYOKmkUFDkjq38O0KbTgEgAXIQxQbAMnu

a2Zxyv0V/Il4DFF586AScLfLNjDvwvoaXGQ4qt67pkf85Nth+irMlr9s/3a1QrWRjCtxGyzx2XJgyaSU

2tw++sIhi/uqi1n963iKzSjMUx7tOw1StPWU0TOELU
J6Fg0J8tL2sBi161yiRYHAJ4wg6ov1osBfulmsC5S/FlNtVnLWGmiBlw7zLBdQ3yBPAHKtL5ZtlCifab

rE8Yli4doTV/TrwT1Xmbh7CJ0GRZlI9iSFIySwFA4M1S3dOTRJaIdcXrwQYo0CmkYc4/iOrErux9yG7D

Q4CaduB+gY48TsPNzATZpHAj2kFOBXxHEyt07s4sOX
+GG/9EJam/O43Kr2dlbBgdRzeUFssJPYNfI7YFBb7hwcHxkqamiXorlFLfDGNFXqCIeuXVq4gO3UNyBq

4iT24maR6uczqz9Bsa3UugWQGs/gsnPigDXHBlD0pKvNGzVv91l8tiUGUKXaU3EJ8bEaFRV1Rd8EvH2B

szed1Ng/8RlXiNSM3e5tnd9/dgog7DKSATYLpDry0M
JQCi1Q2DNmTP/RlsEWefHZbjqPMA2nESS6ky+gYvdCD+wMB3+AtziYIaP+FXMFfHRSRt7i+lapbt5Tfl

TvqwD6X6sHBDZhSExDtBnF3Bd/KoCPQaMPdu3z4KpN7t+2cz+RhG7dNPbiVFrLMmrG4qWwVpdvbK7x8o

FEoGxf5qUvMtClvNrTKW2Oum0/Qjy2Tf8qZGtiBsWO
6v6G69pSLxO9bCellhSq6R6QIxmq+M0IhYPTNVm1rU89wgFTwcOnENLGiRPeBhhlg/olhjQ0lja0txDZ

DrMDpIjcqH8eKQ5Tg2RlN4Km7EuPWu1qs/e/0TDmPl+vHgRio1yt5AULgJWn4yyDQwD4BC6P+uL7qMK/

zjV7C4IN5CztELoLOnuY2EsFKUZUxXJuJG20VYH3Ws
8vh4Olh+XZ+YkyTS+yCkPeuFwCaboh7rVd4Cd4eScIY6lk7cwlYa3A9+/9jJn382fUIdyKOrhowPeS4f

vR6NRGIit4BbkT5zsDx7NH7SFRipVLkq/PfQoLOhCLlcg4xJjJYyDNnBu7XEZEd3dLM2TZbRPjz4Ll/S

q6pNOxKHGHgvTXblO4dYEhnz5LvTbmm2n7yoBnGe+D
OGDEGSAqPkR9+rgQ9fZLHnaBxKlp+f7z1GiGtPpwBAF7nH6Ch9t1A4muTqiDWMXwmEA8W+Z67dFjU22Z

+9ThHcHFTAKB+o0vYsirVvb+ymUT4v2KR+R/eath51Xrgn6gtGt1qMSd4+VWbkRyCL/Soiki5lOBcypt

n65q2pj9Ue3GEZLr/IXEfGCaH7ZQIix5AG8pLIpI7j
D7tRKq/wncXhuoZpHHcSSbUoyn342mHGZoTOMiyl2Ec2VME+gcNO417Tk0/NIK8xJvbNkZAC1+6TrFah

/zYHqv7AxJ9UQmfbXamIRqqViFtOFXUAWifC4z+Wcs/roeC6svWj8Rx1BIptLP7JBzAAVmGu7bCrDWsO

KiJMgn/xLPf91HIsUBnCXxIaxVxMkjb8o0oMkc1sd/
Cl6P7mNxP8qmgQAL3u2gUz9O9ZMrnpvvLpa1CnYEve70+TirYlnaEUFEE3nIPeIUybuE9rkvCK46kMW2

RyUfS0zrMT5AaxmmVBBwq+ByO9IVOHGHsdOJvD0gUtiDsl29JNCFLhD0caEGphDsB83BiVJltVYfCvbl

66l2TU/fWl7oSwj4Rs08JyS8AfGnlmTMHSNI3X1K1a
nADaBQP/JD4S3QJs1PySzr1DtAfAcUyvRu0I8haXLJMtbRNuTWnYzF72rqSkATHtZ3VZet6hLjfws5QH

PbGpgxLv+E2OSw83bGQVqd/51oIH8UuyMOHWxuMLwiZTV9xEaQUmibqkcFreti++XH0yZ/96OHtsuJ4l

4W44MvVH3nBWoql5LbAxFrfXWuANNw7iU4wt9CMez1
2nBZ2dS/69nPF/o1Ger6GCynybmhjx93hMBJDcQ2Qtc8h5jE/i182D1kh0OJ/5jkVAfhyc+I7KRcBTpu

3G5kUqIJoXv22EKj3iwspDCviIeD/nBdcLVqrXNVgGlXE0Hr01gPX9+wrrziO3YUuHtk2okC93XBHxLo

uQp1cp4JV8pNrMX8MiQsFLsx2X79ZAcWtWX0Eogzba
u2zNuQqaiKmXYzqkBE1mtoSvd6P/XXC7ZI9Yo9MglYJ34suPUZMV1xWvnu23fc/LevWUMylhbR0zfsqK

+ExoixufAsTm6HGi3Cj6eXCjSMT4/KCm/ZYlL1qoNvklc7sR8/AS+BmVNKTfcMuXxb7vm31bNQwskAZG

/Edx3LO6s24MH8k3g1AEvMOtBngiZNefe+rW8EmoTk
fld917XpeCZfA2BaSoEAZ3qo7qsjWToGdzgx+ssEVFlp6p7ff73m8cQCDDHhofKxu4mH2aG8nHsmVfUz

9YdRzLozrVoPu5TbFby3hrcfvifdDbqca/GAXTJ76tZpibeUSPcp+7udT28fKWlOVctNG1Vtou5a7Apz

LEEM2Wdizt+Yh6EXojZFg0ThC6I8/RLnLQ6Pa1cAIb
Fb3+8qHjOpkTQpXSLI+g5CkMl9Hzwfn8kfMsQLjE8DfmDfm536ev3JB/kIGAAhmnAZua4HiXlilIZ8+V

AcipDj5TzI2bGz5FJcyMC8D+mE4OIdPSx0G71QpLwNaCEBtymkNMlRw+y0F7bW9T3BK4U9g78GK2kTUr

g9PgR4JfYy5vPhalHPpHm852GQTZ/H+BR5T8RVXj65
6HpXe85yYy32vQLEL8ghhofou4cX8QMoJzQXxJl0Se60w+Rkirtk17fXXCghbCJjvRs37QaDQbeWoVyR

5kHPDHlzUIOKpEuVGVt3d0vbaqADLJo3R8tt7WpY2ggoj6hasKYuWzMQx1ja3FHak+80TlcL5JxQlkIM

GEIb0s5nXkNPnOmv2B3/TbTAbKS7xFDt8mNWohcjkZ
E1JxTbarjQiAP0ZkRpbztuE8f1xjhIya1cl3h/C9DasYwwwBrjsPH1Lu29S8aGFWyrM7L8pPvn+SXUAf

gP/kBLkq6hGiXwtWUkFZqUK5IJnMpqsI2+iPeQjLMFPprQMxZxbANFj4zD2xz3L8pi3xOqMccUzE22Ee

veyKdffBqhVHyqK2IJbVtGT0zDKj+Giv24ld+m8/5A
JN4eyqlptzGGkYmxfiMHFqMX8+cj/ZjEOGoGbtCL9UdzrG1bzyuvEMcwP6mQoxHqmiZkjtNxkUCVdtsq

gpg7is6bBbd7uLCn+e+nCAAVIBm30mxCFedFR2KAPf2EWbC+a59GEQcUh6OhakLz3RyLsiD25P+vvxPe

7ocYUOX7OAfnga+Gzg7aJef8tssSYfdvMj7GhcM4d8
bZU+lNbbzYYhr+pCnRVNTLXdLSxGViJHxI5/HRddInotIq1pdBXseT9eKUY232j1V46P4/A3GuTp2hP4

n1FuOciTsvo5NeOIQSl7t53VUh+a6AG8Snpch65G32Z/5g8KOTXNuw/wMuHPg7cvFt+QBFKEjy+pI29a

/HydF/AhliV6ds0cLykXa5eb0mmgD+R35MMptJJ+JU
qLwxyKqu9FvQD59qtreb/Lho4wm8io7KntHZ8yZGuCADBIbQtR2q/5wTLG5GezeY/TQIz5pPQwzaxu8p

cN3kWrlTuXp9ItBdZp9sbsttf5oQdGaINiBW8epxxC8VBDole8AcCz/+PpqePCOyo4ZLEQRb9kebYNX1

okk+VfckMcQVgrpfqrhypK9OjaDslqo2KDPaXboqas
e+47GzhSas+E0iXF1Z+hIn7EmTKW32O3/+r4TLIfY6IvRNKUe7KQQLlfDIaYcscqQAZl4A5ZxN4xkHCo

cq+QuTFS5gil1b+OD1iGa0rKLWao6NuVKIl47ievld/q6SI/RErVHjLBUeQhvyG/nxq/twzhN9s5aADL

8TURqyxtZJ+BidmzFEEadaUQk/E6dQQz39Luec7s9N
jv/5ZhipZNau6cVJfj24Sm4cgQbsnJiYdZkaVMEbGZgfIg8YymO9aeAlGic6zp02JiEuGiU0IiVYE7n8

w8lW7Scq2yR4CS6sWbNsec4vvFcdv+VoxaHR5OHfX+4drUT+htsRjkaBBVlT7jzK/r/QMRAGpKWo1why

NmgcXu9C07z+rYDpVFsdariU/PhJmqVRvAg4BOyUyY
4G3niVZZ8gezlRoeB/p5k/RZReVK0JN+H68y/iowIXDNx+OK93Ku57vWc6xRX5QPipFmL+ZtU78mHOLp

fVdbuCshSo5C7rA9ChHuh4qB9IMsTEviAACHD14Skxyh9O/U+4DtjMfyGV+P8gaMsdU23oExoQh6jkyf

utN9FgoyZtxVG3rFXSZt0X1CDA6ridL/SksoHNH05i
L3KC9APu2jX3gp6HpI5MGzDSA1TYz3QxaWEM4GF3jnMfcNyu2mpuhI44JlTiXQGQUKVNEN/bcmpbRP04

FxQLwVY86EbuoSqrx433X5Ztapg2E4n/JN9cx5SMoacOM6f1YzcCokcWa/C+dD5zig3yi9Y9ayo+dHat

BVulyvTnVNR+MFJfpbOJpZehw2CYDzrhRmFsZDJmPs
DeIZC/EbB5Qehn4otXnk/DstLPF8YnQ3H2ydFyMIfI12IssmrU9/zdVYp9mLUsvPw6gYPf0avFcEVRsk

UQQM+j4VdKr3ducVhYCMbKdImZ2nS6S6Pua2aJm2GTqeBbhKJwV/Ir2qQyMdv0ht9vLSpIrBjzySqgG6

wi4Z4DnIXKHwcadapwKLM9T55I3TpuGD+nj6HRPBMs
CtaxmfCDwNup0sd+Fp1YHYntdAWE9x2i3RMunWuiirlH1EAWd+rVd6z94LresDj0ZedtNi1frdW2dO0U

VDmp7ravQQicb+gW9rw+TFIoSMcZMbhh7BIdIhqy/1QRGpzQd65PA8Lg9TugaQo0ewFrW84UWN5H3A3c

EfD7twNBjYuo1oYwydrUknw87zZgymF9UclEAoBk2t
0IBhGMAOCIAg31qBXFA3bnmbjK44ZLQ2zhyumBdLp4giKWTI1kFA+uLlSVB/smrKezR20+zPThkGaPyI

iqrY1Hc6oW3Nx0mBuBLs7YO3B9FaZw/39zpOzQdyOpJp32ouKDRhhiLVZ+hP0etnhv4/u9tnivN8oJI4

eWjI/5o3QCEr0HBCpzod8cgXn59iQ4e6PQwTip+kKL
UHxHoUPSSxvU2FXrsE8CavAcWGgGP5u0Jb4TJfV6ERmc1KBlG2wEdLCTqBzIoAkQhCallQN76icrf97i

VnQMI4ozAGamowDCqWDp7NoVFuI1gmzPTr/W3BaeMjgCB56pzqWgV5JRn1S/0HoGiZ7LXgacPonnGHqF

+imTpolqkQCDddq5mFiPmI4xz+LIrrJu91v45PMlSR
V58uu71Ro6eh7wXlcm7EYjqILvN8sRwUxcBwQ5KzToflIvE3klGkX1F1hA4x/e+/Y9vv0QYRCJnQkxzf

RU9f6rnmnmT4Gwgd3L5k/gX8vtaaZQyZofAq34OnC+AT53Wx8BVenFDYqT3cgOM8afBzoAXlsQSFvjEH

OFcOqBsjqB5HVBUMo6vCHFcY18Uc0TnZhnnABu2i8y
aTWokKHv7EsS2sk4kUjyISoC6mhU/1Qil46T9nXyFXnnPvYIhS8/abZC/sntL8A+Hw2q2CiGbxv75Jm+

b13tOuvTSKEjX33dYIGhitKP9YaYEI2c4NDIS6Qk/8ms8s+fBrAyxlOayBCgHp0nN+BL9QHbE9JPcxMp

s96GyHs+c01A55yyC6Xobp2fYz1aW0d0JaeY3Oysrn
fsm2urGSifVpGmCZaT5im+gUhvyN2HBHe7L2HKnlnky1ehR/SEZ5egBuOeiZ+84wm/6tzT2n09CiCZtj

gRp0Gf/t/YuCUgowCABfqqdEGsweVRviOJ1N+cQdKUInYzkUPIATvCB/tDtghFMXqZxlVn0QYi5HYEKP

atJrDbPLj1YWZ1bocQ3WAjgj0MVsWL1zJCr8N5HN09
H+I3CFuLdR2Vhd4jZT++coZpFQTREe+HwoSnN+BXQGEcwZphR6utzRvtRCnxeVKFm0eR696mxUDu/7Hj

9P/18NTwYJ9SJTNCqrZucFXmV3X2YHom1W/pFv/6HilwWW/acjk3kMMEx9Ee47DTC+rdzIozHRMF8++F

0RsmSR0yhzO2bE+zMpUYZhjAy3opPs2vuqnKjirApF
1jq2dVFjMvcJkUtZHY53kB1UdGZT65aJLk50XmXpPbs+bOrLy/KEtxlmlvPsIfsyPvdPPCcHdyksd4c1

r4taSRtn/r3KPFyFAUogQFylfse4A5FeX4JslZDQrjDfT4sLxim5qnQb5AIOI2ySTm5pBVvsY6gnj1lr

nrXrtsWXhgtRxkq+pOu6itkimb4yqzTxxU56saOFaK
tzuD1hsXpnyvhCqVPUMKDxS1vO40RgP+x7SVjSnMvnkIG75+NjctTjtZiTP6JFssdv20FKd0naeoC8KS

AREBS67l/Ln+qcexqcZBr5fkb42FHWSvDzHqwQjvk4j6QWc6Amm9Gs96sqU1Fe3sJWtb8Hifgbe1FueB

FvnOgaCQswadxvO/dcxpesfuduABigyk92YRPiX2rz
LAoOYj+jomj9bcChiO/mFIvRiJrjoOq5QKgmUy9293Zta5tQsMZ9ytMQW0jd7oLTgK7qE46CAp+IMZRk

XyAKoexvmxZmd+DNigcbVW3u8haY2dmKYuUwHZUnojjcBDBi5v+AF40tFCcT+4osefz0eQp2kiP8ie9B

J2t01Ev2dADqAD/o58OcF+ecj7NaIt2VBjrvSTQtQi
s6tmt31YfBohrPOz+VIET/2ZjerRqHr4pFHbvIumga20MFxmqESBqjx63nq/NQ//1luW/+3Sp6Mdu7hws8

2cWNrWvgVfkmIwGauAry4GgzZmWmBhzvuO/yeZ3/09xNrjm/eexMrLgLS5zqiE2xM/X6ulf5p2xnSgu6

pW8RPHqwivZ8OCFzRrvjYo2vRyhfmrkLfUxiZnbGBX
onl+B0Vwa0cWsQcbISh34VKyaXYae+jeOm8KxAImHC123836yGkisCTX2aK/gdZFTUlO/BXjZjX8U7hB

G2+L+04frTbRurNLYStJK2nE+Gy7Dq6JstFyzyLFlQ4k+za/5SU8PjQRZujtMrZp5t0ie+h5/7fPhUNT

fjq9YbH7dQdbNigDYxcTBdmyQ38N816aqg/8YMQvc2
kzfwT3YfAGzwLmLJSihblDFyhiqLkkn/0gPfx44+d5WrcvwuQ+hffDahII9wKMbvzhkMOytCs2fPbgNS

ZXac7N+tEV3Q0biRqzaECByan3nY0nj7tRpcUTF+MWoR4fFNXEfSQ9db2f01qKZpoWypkBrz+r8j+HD9

tkX4TspWyFfwpnwXhuOBqmgcddD3uXgpbQn5ZOWaLC
iXkN1sEhdzDLjfUUsxQR32pGsrTcA2lpK3/KU4rQZgDXoP/w+F188z6oVak8VYG8f6C4sWvre3EJ5pa5

0yTo5TWI/Xa1HfRdAX9zy3mgm/zQfKLCiIkPo7Ck3CxAmzOc4UM0VA4Y0vMT1Ltf0CfrWyH3793PZviy

P+80nN1AyEESB/5hAvnQeEqwbgphl9feOgnRhhzogG
aX+hDnh09es7KLxSBB4WFnpH/8c5bvKIF9vg5h7FVOaiMANje0lB04ZR4Dq8rI9L2CK3IRusDrsncNAL

30gReMEC3QcNz98uhtS3dWKgvowZtdJXn4VZOafr2/5dwkSuyV0XoD3D1RnausgqSkNfEq2H/TkN52Nq

y+/gIe4s8v3LyGPalH1QkIljC33ziCbEpr/C0XqLmH
EO0ADHAvOJSEAUb3cCEKZ5IKorxFUTkO5lTdyh0/gzB6yIrzbeb2e+g5N8korVo5Uccyw7kLTtMCB9i9

+xnKKcg0tAst7jtlCqwF4Jlna4oQvNcO/yuayJngkU4d00LYST+BPjPH0lh7MjXevpytByC2E+55G4qu

vc+wNhjZX4lONyCT10tB0E0T7TBqSgs6iEMNRsO/YE
wnTd3TRE7/J9b5YxjlPc1ONvKNVPPBwI6kXIiql4t4anx1Xoy9gFn5mReP44DF+Xsyb+XpVxm/OZ3F8S

qljuy4O1PVEpa7UNhB7MU4LeDXGFsgTMnHpRcWdXSyuCRHrmfYxBwS1555J0LwdeM+sdmEoHjrTqTDX2

9lXcMK8r6JkQPLNdiiNy2/xmkN3N0XBBqolmasAuKT
lKFcPNk9z0Nnqj0+aZMKxeAhv6mckuTg/odtWtx8axxT3I7NHE6cyfiuq8obHEp25/vY7EUHQaSa8/h2

tItvGKBAhTMyVxJNIaBjwgh2Hyut24FksnJai3Ej1g5GN2J6Ya6XDr5p/WZbROGh12yTOgD6HcoFQ7Nj

TfXmi3j4e8/yTozZhEnCIfibsfGSBNYZXTiAEJcYPN
fhZlar9YZcs5vHGyxddwBVPDr5uhehLti2ae8VrxEDCwd8N/zJSXelJ5lLrFdTgU/Z/lK6bOPHqpMz4v

sMHu7XWdBAZG/g3g6y+R8oBvcOwkrDxLSIvWelDfLn6G6psfHaxfyIX6QqdUhaOYgmygP8zf2tTiWKt9

aeKnB6i5igShUcgs07aF5knAZOZIe0/6m0ZUOPjJCk
1x2yrEJoxbU93V0C+lRekdC5t7B1E03qxfwNjMeG29Ycm0uj85467EqcqDqWUSRJkrYVlcxnjVzuYGvb

R989khBUnzM3rtrdYdWGzSVGGA5RfsBfFoig9VFTruP6HbdET/7+xrN5q0mGuIkjUN2ukmKILpfFsUQi

psitp25Tg9Bw0NvlX7hFn9fEvGhPIHW68Fhh9SKEje
hRF0Nm5AhmtksK/4rreQA40b61R7DWuO6iRK82DlVmdys0cEG6cj/MPmwNx8Bqd9qYVUXw4YBRhk5vZf

BMOKpVpUBPFDWrbdisKTlJTxxhOK4CiBJediOlCE2ONI7WJXl4eSZRdKzymYdTaYDY10cnpTRvYj9PUQ

JTZro/Nn7C+/v1LV+6+mQSQx+t5HdM71c+nIvVGfK8
Bkdi+pdJUrTh6pu09sUprCxQqbXQP58DNK6TCaI8TrjE0fQ0K7Wd7fPCHSY5PQtfurxVy7acGLM3ngss

ul973p/AftRgaDyhKiz925PvYC5flizQ1nj5/R/HCViDtdfustm7sP6HRTm1B0k117P4hivOLAR4xtLT

X5Z27jhnH617p8Kkc5w4ApRI/SS5BhsxRWpmhQImhY
+3uiD8WOjni6oM1pebTmdC3ohp11B/p0RB5obOgauO8qdZOuS/TvPmxbSWisgM468paBhg7h4rIhR/QV

/eJjl7B27mXEeU8GOui71qas9I4OkhmuJOBo440Z1f7IzhqefVQA7Sep95uG7CWhT1W5/9QOkuLLajqj

bRXJ4P5u1bwbA32Fa+0ezJxldL2IgpsjP8tm2Z7BYP
2IncP2cbnIa4Movz9o2mJ03hYICQcYEJwNbj7JaR++yCTiP1RLz1MrkR7D8CGz9ppFJ2Eysp1IZHg4u+

Z/fh0ycGA2V6ADctum4f0i6lnfeokJWxBUOILwjcOtsf22UlujtAsGvYC3vhTwYybtAj5sSkO10PwzbO

x8UlVPhkbj9RHsKyzC6/npm2jy2bmTMdcRG3ughytc
dZpkFuux0RO62AOxMax6SYl5dWG9lF8XKUlEFzdBY/O+2K/MWdkkVnxN0/J0TyznF/lxA4DJ4xTf0UsS

1l/6eLDAEluzc5Kry2Drwax+l0+vyDGQKyG7VgcN7jh5BFBwME9QAloll+2G7CaTUFbMlYpotWl2tZ8z

jzlshKw1n/2s2cH+b4sC+g5VGhNapo3Kh2dsagRa1o
RXxoWClFAv0opVHqKkDupqrRo/BA3VlGDaSEnUv7q8g+GVEvOH+gPHtw8Alt6K6J6oaLUxCdvYoAiX1d

weMhlbq7VajRrOeUaCJjf8mDlep2HVVen6mkHMB6oZ7PiuP7CqiJv1UQVJNufvl9exWvmZ+UVmxxAGpj

c2hy+4j7+/uaWPa/9kF71clEkdS8JTgBMaXWZRkuey
pER/fv+rxs8qvs/GQvoUjlbOLzyyXekK6HSpr4pxXCa+uM2m0b/Kod6mh2F2pzxdg/bjpsAGKXFwxIst

UlN41p8FSQHTzWbkfzkhNihSW2/hVohviK2mv2oZ3S1Tevxg+fRau/o0Ntg7PQbb4myJReju4Tq5cIlf

zSKeO5bqKoePzcByVcR6vWVBBomH0AJCo+ra3J7Rs+
sNsPnT6QtwsPet8onVr3ga8Gw+7LBikCAr0Lh96ld0vRl/uk/gRs1DDna58HUQ0PxHk/wyUzGguOx1hy

cfo8ZHzv+c9/b7guu5lpcaYeDH2e4CqHLflYKR29bQKfMLCdKDARBg4kuBCJ5NKsIUWDSbhaqoLELSG9

Zmmq7whIHlZOd6QeG2VPm5+H8ULNo9TeF+kQV6Y/9+
8m0iMDNH+9DtseGepqIR2dJmf2ZEzeMhWLpfINJbCRGOG1hhYbxMZ6CdsNxI5woMDJcrmg5IMqbhowJg

yMF/tpyUessGZxe/1jmnGwFZKDVx5H/7nl3uR/MMSwpvwN2TsGz0cMcEwic6DJluY3m1CJwDAniFeQ5S

vS+uh2oHiN/36zvT0yC48t5CdT08R/IympqmGICChH
jU1MTrmjk+rwU7jj7ekfuNxtsiu9Fk7EsflThOvGGZsLSioIp7Q9Y8KY0xR6BR/xvK0EkEZ7Oe/T7kO2

2x0+8bl/Fzey985SEAcLY5uQ99cdInNt1uh+moIv7B2P/glncQFpJ5eQRjjtXYfNpaT4vmClTgGS3URx

AGNTl2abAJBELSGHq/C8SkCElD3sO5sZKKuNu7ln4B
4sftdg6Sg1b1aeIL/9TviKOVpDtY+je6jejLlu60sWnJ/EMlzwd8aJfb520jXHb0o4n9JB5MWtqRUTx4

G5BxvP3yZt6ihg9A17lkDGVJYNaVxKdQ73+XLafmF/sapZW9/qr/1F50ApBYUjd4HJZfcvj8nUcGSyAE

QcOuSAAqnBnZpvz3N13m/1g/3i6YQoOIwue5yxrkvY
hsjvz9UAIwr9r3HAKTWsWlhPCsqiRni1xnwBmq0hWYw6brnHSynFCsfCkBRGBHGJNi7pZWUlygl9n+cJ

Tnv9hBdXVEq4GyUZRxDfTzoap440OI6MeXjg14oVmzH7+FMsMHMjauHVhr+FeZOfmq5uPlgIdW/WOSAw

8ScNp2lUqPAYmf+chi/gHGNIaClGUXyxirmxW04XrZp
ufh5ynNzac0ZUuErTd6YfE/WChArUsQgilzh0MbWfaszqi/+0Pvu/FJPsbzdU8iI1Ub1jDTfsqr7C2RX

nnSk/+AlGAFOmHhcrXRFM5bFLQuAcq3StVokD/YM4tjp75wd4MwqL6sYFg4WC4P5h4/lj2sbClXcRTgb

frC9Jdcrd6W+XvXCsEQN28P12fiuxeiItTdoRyuxlD
s+14AfF+VjdZmP+bWe85Inf0CdL76eqZpeKozLPAQCOn7F/DqSwKWbCmkvzdryUHrk1IcDHnr9VqH11N

CNiBVW5OIYckkQ3LUPkq7qO/+zRgSAivhPEr55y+r0wnYZSQ/Rx1h17Ze+w99qLrFaUbRb6a9MTTKg5Y

l+6fcJl+iz7ObSbfhZQitFJqEuRS/BS/bqMSoY73lu
t6JB71B0Yd1YmsaYiVSlmmbjz3ikI7tdzkCXZER+NGf4+NKHbghxNzQh+Lg2vkkDEV16S+HELIYmOkla

tVwmaDUNIe7+EXUlrxPxn3OdPlOBF25JgSVNyqqOwNeFdK5+zLlRof1OQB+mRhpBzOCoSEVfwdhRRl68

JWyMIDLDhXG7gZ4i/ANjAuNmPGzHGq4auAxZpAVGyt
RQwT6io+W703J/o19d9xJmY+stLXtWPhq7Ebq62PODpDPKOzG9TobAbKWdWBXSa3f9l466NqFAUVMiZO

Ln1nAbbfz0y/rGctm7GHxQfFfgMeWsm48ncbNyx5F32UfEn+4W2BepZKGHasLIM94ycLfnM1BKk9HCvA

/JyUCwz1RQ4o+JZN2IUcoCSBTQ3xBoJ6j4S99VnMXZ
fCnxuBOjUa4trSDhUZOI+k279KTnf0qpPan6T0ee0dEZSu81fgFbTpCFVvb5OsgDLOgnEVrlWNUhiK4T

//zYtlevIRL4d56EmxkzoAkgDVngSJ84/I/9qtKEGVzK7pbpWJ60c8JGdE15T+znC24gTEbQViG6VaQi

q5G6Dqz+//kRehslUJ1vhTc8aseSX4104ZXpHjSXIN
bj1tCrv6FP7CkSXPvB4xK1szYBHXbLSDc9ScG84v+quJ5mnCkZDz0pu6eFL5tdubucdLFfLB8bOWEI+r

X/wgpBHXAnPhnvCTVkagrTsGcisDau2hxUxK3GwIVtoVvYSNmOLCOCW1r2Y4hwnbtr8E3yU5ee/zmRV3

k/+f76XalokpLaoTajz3kqDoH5rOhv1GRXRNYvbZSb
xx7h96LIHj/3Ujo+vIWrAM7iliWBdofVR+qvWWoGadbOd4B4CgfRPNZ2iWZUw3zkfIpr+Y7rpm5kQHK8

Tpd8Lqxfsna6sPqDIJTDGAgyHHxdod7o88t2YAbyAPdiSiDwPfUpICLnmYohVzPfEZWbAmATLsKlQXgJ

zt7/cy6dGaJPme+HnQIyd8oreKUDC0odpWvp1fHMYT
gG3IM6AAIu3oFOwz71KVf7FpXafPPywFSHUh6vr04srFveCiObagxyIP9II+9+ng34Yw98nz+FSBKFGE

eQLgdTYruscx3wxSS1hUZwSS2wRoNreCLJH3bCLmFyL3paq1V1RqPlbpH6U3Ahn0IyBEGw4TN2TInPT+

T/ZKMQYSgmT6VnVXcrhyNHwzvfyljhdfiAhV1VeUrM
RS8rJKyFh7JZaK9sFVAW1pEYwVS1QfuMxSYYDYQG5RM8Pleiont6WOMmNabwF7IJPExXsSzhbjyPdZhN

Z6XZK4DzAF/U6btFDVNYwjDgldTyBdc1K4IFmPtL30wKbeELGAnLKB1Jf/OK/sOe8SAc5ba1zN6avTfw

qxOfyd7i0hFsuDHVPBfP1clnyTkvv3StvXmKt0zIO3
rmDvjGFS/GXx4ZLKYb73mwUYELso87JdIuCxtV1waA8T06sULnEeL7EBOydDFAP5HlCYLywd/Fy1q0t5

RSX8iclCazOS4NpBrryhlvX4quB+GRChrTkQnDPo1SjfbmB9tD+a73hle7e7dJL9TYpMbn03/2teEWLU

uq6kZGhiQS+DmPJGK2rTjEoQc5yavQNf8gzm/mvayE
ybNO2r6DRPlqPEhxgjoBc1U6/A/NApoZGZP3QN1dLcZL5xq5HEbUVm2Aji1N+TCRrvLrHu/n5Dcuaf5H

v1X6yURCffVhklrppmRff6amQJKOmmGLA9HwmlHVH0invG1OnAui1fsfLJwwU+SzuzZ+AWkKu3L7Pruv

BdJQgiQ3QkA9gn5R+pdwHTPBUxK348Y2jzQk8629zO
yisHwRtoCb906zouTL6t49BxhTuj65SLdEgMKD/5QGXYycAb6/Q5qriqUQD+TagOtf4YgZe844qExXkU

SoY3P3QtjW8F36SOk8ztE6fR1RE8r+5uFk/WIdTGN/TcS3ffJOUmTLNPWgM7celeOI6vez1867LGXD+c

FvqdtvPxLOVEyO8QD3/gvBrfzKlerbGL8ATZEkyLTk
WfTkjoqldRZPBa2GuXGW7IYD5LS41GQHoNh2Weba5/RmRs4FUkb/xM+KkshwIyZzwj9kitq/7LQFpnPC

B2mgeaAdZYMUkTUAsdWfQ7CvEanOuNTSFu2C0Q5CyyL2iWUCbbIsfCJndd1JT69G+/wyjYYSRZNZlvVl

jO1Dlz4Womxh61OovHsComTm787Y05T/nQfSs7vUvU
ss7yAgiy5S8++JTdk5mzW6sSTC3PTMCPiZCqh7ro5EsUI7DmFLwu0dza35aKDKBgv0RKj592Bvgh2WOq

2Y5kYV+VekZPjmpTN6BFhXtfmIrRWw6Cr5cpgzXHUBfQCrCmy6o6B/sNt4iFubgxwq5M5S9ZJA0NhvXC

eYs3sPzuEAZqqqwaeVj9opbvs3vD1iGQ5ipoR10lq/
PHv/d1Hxr7WOOAvUwu+Nzto2AvET2gwqnmhNsvpqXpw5nns7vaLPWyxcWGW5ffpcu667RMhFd+j4Axnp

qMYEZ5YeODs3SHLFBFUYX+JwspPSkFYR7+m10cc5aMX4JF4JUFs+RY4AG7m+Iqu9HcOaLpvy6M1/bHaf

A6qUhZ9XxV9SVlJbEYy6ufxFhEDDxj1Fhgf/0yM9d0
BUwwJnIsz5zu73kgTT8BB7t47AnsLdV+biBreuY7papiC+hA84hd3cYfyiVXGlnqvuk+kE5TrSlcRa6z

cFl3oFGz0hguxm8p0sclPPn8y0fVqU6hHmmd5l2d+gFpSs+pXdPo9VNPmFB6fhy3OgnT3EvEHd0B4DM5

1RfO8ZGwUMCT7secP8CDaXHO+gRR1N7ttnwd3SXdxB
LNgI0JujiUsERk7dNbj6Bs3WzQ3r/k/dfkXh8+/tzhgzgZqLJ0WsOjd/ZwpwzeRdr1VJ7vwp3gGHbMiy

ygerxCXO+hmnUMiCC0tuO4v6NYCIizf+Rrs3VVXMWoJxHf2gKt1amqJqb6p2VAPwV4B+fBBGFQP33gVe

+yo5azvGWEP4YONKQnRF8YSFaclL8SDH6xF1R8XwS2
TSVk0YUvkOQAMbveqq+/vD7Ezy83/55sbD1I+Lea6vg6/5nIriydF5CjuA14/VVHqIJC0wqsQwql2S8l

x6VGFmkvZ6kWC4yRfXbjIHNhODNSQyWEj7Fzp0VJskEhq6WhRjTC6w92cYZA30oW1DAKsEkL7vD8XfVH

NT07YjSAprtLFQMkPFmWy5XBz/8dD3/56t6qbd4n89
m8rn7qmWtNCKacvUm694THJQ4Ssxk2i0TE5r2K1Jg4RldTBY1Ga0WZ2AMvXn5fxW7HyfL7YdtIG3gT4t

jVN+nLWHE7fTal7dUOZFQSD3heMICPH8e2G8wQsIT0197KXN+FAGBY4x3KuBrhEdHKv+3rJWTRbMclar

5z8SbG8HrLgMfLrzHh4XRFrY7PpuDwfYYhYntckf/e
6y40/lIGAJjftWcg+Sk4oC33kVxyJSmYnESPsU/Q234mBcsFOFjZeud0SP7HbZWGIP2QVGPDA0uyR9kc

M863qau7h3s9K2D0ErBsNMOd4rkSbm7UZ+0Q49z7zEjzTCc01cmYl/ASUZdcvK1JaosDH5c+vbM6ZD3n

bGU7ZcYtskej7nJnEv13jLN/hTRBu5J6qY1aA1k8fC
Zw7dV23n2tdAzsmZnatbzi9P5YDapTIZatuKVGsETyzcWktkcDAzzC7wRMXI8/JmmsOP2X3oSbkCVd/2

S7B+ZlTp9RmwEB1NGGqwEIkZJuy1+JcjxzUk9V0nqlQwFI7hfmdLLfr0Cp0idGfOYlZciuOHHX2OStaD

TI64g+wYPVE6xoU9HatSo7q/QjhOEPLdM7nPAVFhrw
2B1ehigeOtcqR5lC1Eknn/fqfsp91UsDoxrsDt06gfWFo3xuXnh7Wdh7tvSNz1u0ePQQtDMWrEBqK5n0

ak4/lWFLfcXqwU9BCXdKX8txx14YiQcrHHx9HnlC7lgkzx6EB2nbZtl2ATBcaeHTXjpMuZh4gZZlT665

/PFQWK38gq2Cu9rrKcsMb1KNOSI9Ifypz8BQv8deK/
Lz02XcVeaE/XVps5Z/S5iitIJsGSyO3ZqJacT4d+UnN1dzcg5VKfiG2iAcFxm3fv3IIm8ZhUlcOwNH9c

c629yekB/HaanPGE3mKqJxF0CXQKNxsPG5hzEfLWmBuAd38C2V5ZmjlugLvxZwG4NkUw69Sf839BxbT9

fIny5uJosJGbEh1bCKnlI/c5g1ty8Z5xYK4rzf6MUA
d3wGETkzTh3JL/nrlP0PJAzIhc4Il52UsM8vtTqGMtWayyZj9NTLo2vWtoyGkx8nmCT1+xDrHaK6UAJC

bpR5/gAGB3Ew20xPXHr4zupjfsT0Kkv/6Llb285FLWbTQQfrT6+V+jaXPCNHx7Kn9YhN0U+OqDDzQ4QP

cKv+p6cLRei9Bp278YRpfnEO6tzqnJIw5ZaRukoQby
jIZxxvZ//HqGZ/BEnsw+HokYo83sWcsU61pH/GccuoJhd81DbJFwTPmu/SLg0S2EoQf8fW8bxXQK8Nja

NTXUerRC3kSt83LI5+v7J7rWlJ14Lh8vIUI0IiTCFfYUwTdYzNvcJuSnwmy+YrgRi1ME3PH8E/2Tb7hC

0AhtAUNTDxun/uj8yBDLfiaRTdjVJ1ZaCK2w+hRXmT
ViGwZtSrBZXDtuyWc7R0KZQVglEE70GxCR7LM3XaqF1mhEbmnS7FaJ+IEjcA8VwLbd43xwRQhJYMjN2F

Dh2cwbGyqlG0q9mP4bDr0ILYoVsrdgTkA3YQAyxiuMd83oJyJb2LH91jcQjUsVSBcAqRU4Hpsny7/twJ

YVig/ujgacm8cQOR/l536Kdi+n4cUzmwh52iqDdk3Y
SFjcjy9FgxnQk335rddjJsnAEQpDY5f1NYA/UEq9NmYXcZeM1fgMSE9hCS/HeFRqwm0mI0sQO4eTwK/I

bddGstUP5dBBQ730oHZ0F546camgUPpchmG8C+VaLzxBK2STvuqBV0xfNDqcN0RI8W8WChsMb9NbCsI9

h3hv28VVPcx4BXtwqC8AEFewj4Z6pLG7lJmO1N6psi
T56M+7Uymm/luyOmrL4ojKWDaCYV/uJOkYeYzVqXz8frav6nulYDhPyScZwevnCty1bEd5gc1a6W29iG

xgrOJaJCP3RxmdwIkcCDJiynj61ephj03L+KfW7DHYWn3Y+KsOvsEXWZkCbJYmREYfTrmURO/zYrAnj5

vmt25dXGim/gg/g4/UDSyxsRBrniGFGbR0aMezkgvR
mR8xB/S42p1z0+mIOAJn1OjU/ZEBoA88sQH93JHo/bkp9gDyh/y1moBS0U7O/1zXQajkUv/Wa03T1pZW

QO+CbTDFS24J3gAsmZVYClxc2+VYW0Uq/52eaJkuFWUnE8XshDxgHwK3ZepYBdMW2j06X8b/7S+11kEx

ZHxPm3GlxJy7txC1g8ZlF58hHBJYym37FqJ8BQh5cb
ocRM6Vt5mjzA7sgbQ9fQFN/n7Wygb2LPspgezAzodZ04zpuYTP92xZ7s97yC2/MhIXGKNIz3YG/dBxPO

8S7PedDl8MK4q5NRL/rSpr/Rp4evRXgJVlfq0uya+KGxqhlYSScnc48poNMCdyO4Vdnc5QjznBdvKoKe

TIgZJkHl3sMD3IPMsUKM7WDBJmOfGZN9PqnV2eO051
H/oKSyPcOn6YU2EJR4bJYQcM8Xi5/yQom3t1u4G6wb/lfW1ikKnwr+Noqk+Pw7l9pF5vp05UFyx5IRg2

8jiTmHTzt6eJxMk0YWOGqewtBvgwG1VmCTIMH3gkKiCHwhDBxnx8nw/Ybzs8pDXkSuqdUF74KS19E0vJ

QmqyQtVrcNsB1LNppaHmgQFatqKSIZJtAOsnvdn6W8
+FgQdXUBFz+qGJgKJf0nqcdOBdJ2NgXjUBmDCyyqK6lbvZpsDq4yk9+uPCJ32oCePR1UELn+XzreNyBA

/p5hefbszphtPILBFRUnPL8Td9NqvhVQO68MJtJ3N4mvsERZaHnkHw4QvwHyqNhxjyMfo42/pteJ64mh

aKP8ylIo8JYmtHV8Ir/+yFQ1dXBodJqcH7Z8Ziu+cl
deCOMEx3b5WEEMKteB/qJogROnFdj3qi15xVRIeZ2KJe6gzgbkP4F1FJVQLlLSRt2iWHXC4ywaq8koKD

XFDsXls2W/xYZGKj/e+uPVKEnbz2CbrWPZC69dG31sufgdNSIcggBvlpSaS6prXpkfLJ0eWXUeJkv/Nk

c34e7wIYbLBBdM9/bI0ckeIyDOEq/qJVicg7Xqo9li
YyD6CjUpKS0uIv56W8Hg6d/KpnMzsd7AmHX2ZOIQS6pV6MTxnc4riwTtbXc0ldBCKb1J08VFh8jFbnNa

1OUDcBk1dmjpuB7f/chRbxqyQ2j3QyPQ6N2v+SeuqU/+TvJIVt6e3hDblNHL+HaHRFQx4+WtnNTMbTEG

IYCyVNAz9qH9FRw1Ru3O0y/l6vcz7j9WNHc9k37cFz
luGofa0dTt2troFdRJXRmfyrTDoesP2jsl84J9h8UpcirqjHtVPUs28KTaPKUy4/1hyEDW+54AoI0DxX

dLsGshc2ozfK+ZdaSy6rJVs/7gvLGZUghYun3aZlgqblG6uqvS5FoK95lCuTXTdwDfSYPAj+AvxfvlL9

/7f/nvrnonxRdtt04UU46boJqnxbywsiJFmo7CFtIh
AI0bhty5GqqMrIc1Wv+BmN8R8Ln3gYL74tlc0xoqHXjGcXSgPYw3TBxPgt1HUGKRhahGM2G6SJukAKph

ZCnnbpDvBoIYrMP/6XJFS769h278hrIqFxpY98aOmQpoUHzgA7WvaEda7UbyMBFncpFaOnMmaQEcHuUo

Vj0K8lRqpfoKlo7RljZng36MwgADdhT0evuWtPHg/p
2fDwfmhntrqFHfAO843yTNeSCpunyF3oKqUOxLg+q+QgtSf6ipEAMfnqt7OTgsoV+qQk6kwQpEvqyETK

PnkHqAqckfg+Va9CeJGw1NwhdBzTnL2t6gXG3ihhS7gj480Gx7ql/rb9gJor+aS46k5Zvcj/i20Pykb9

x3ai8W124k6G2QuqDMrSeM/0qD2NL2AdKUeumSYUzt
oKLdMUpMCu9/N6/oKyeqnrpJv29fPaCA9VIMcf2Q73js1Xttf9HbLEL7+A+zCwhvcKAiJ1xidayhBFru

73/+mrcLxRwjsQQ1aJNue+VwKfTVtX0hAzlNAwvrJsXmzsW7S8LD7lWAlRZQ90l70cZSHVlpaNw7S9m0

MgG+wEBudXyl8kTErWq6utZMUpUY+0NiNUquyMlVog
3JlyYxFQnx1D15DoqEi/VR83F7+0B5Qcy2OpgsS68NBHVtQeJYcQHBvbvISZLFeOnV+2xWmmJ6Rv50JK

XcyvsM9Ll3gN+UF+uX7ML4kTiEuPU1Q6M+fsZ7fSb493EfgZNgI8Ox6dIMNqkFZ+QSRHLj+/Ku19tDf5

Gieqlszml9VzgosvqOdr+cCMS0NhTyz97tgrQuFod8
3gik1KNr6/4nqCaWkjcBW8JE2XSJGgW7WG6dmFQm+q2JhuwaFSY4tjgNhMhWVrac4yqsemJhfoXPAGXP

EVb6rMkJmOd2R6WtH6xnMWrAHokM+DsZRdhYk3xY7C2+VlkHRs+L3fO2mr9r5Ib7sUKUNueS9QKVrFaV

1G0OcI95we1urm+BUAfopXWJdN1dTV+HvVkUIvENZN
au7qK5HgiCXcZ1i8f9Hsi1TYENsw7Sjswz1YbnPhoEd1XWLgYW+evfr2YwbI6XGYo+REti02ijv41bhb

FqnAJPB5qqK01oIgT/pGhSeafcUDF9QHsyef/V8Uj5S0NCCzmzeErffMky4Wwws9JebmpuzX0TkzfM7X

qojg+53WkF772fkQ800OLFsVJAtECzPBJqoodarDsn
mYegQss2WGS+k/NqgKY3MwkRaCPGx7sOUlVVRbJxNoD/zYjTyph7Onz0sZFTrRPTh1opjnB1UGla3qXq

dhG6yHAYKxjUYYy8GZ+nr1dVrMkE+/9N9bOC89R+9T1j9+m8ygCRMtopuULLb7+i8SgkjQJPZ8oeW/rj

Mgq/5omRu6pE+JHr+i4xRPXS2HgXWX07fibj+ws02q
Z15P4wmsCB/9DhphZiQkKm5zr/UjyVobkzGZKddaGS/4EC9gk33679vLJwxzg4yZUWAeyh2/ADX/PwDq

269X6AbeZvBHzT5FS3KzLTu/ccYUzDSHxTpmS9yosNcTYnQM5bxCFxRW6pzjR7jxq92K1Srlk74Ez+CO

L4OcM7LKR/PaUEd0m5vvaPsZyP7J7oOcfHYU+eberi
G+tsH4PrX6pyFuIKv4LYiDXt5SB+bZoVSny/XUC1dq3ulGe85+lT/GsG6DT2xJ7O7VdzEObB65DxeX7c

D8Ui9HZVzGGL1/Id38Ub3TVTxjcY13DWm8cF1iR4lFRtcxZhkclIDEGplWxaKP52dHhzSQLLaIJO4Qdn

d013i7uZyTApIEsV+zBb4NEJSepVEX+Q5qaEf7d2+G
lRnrA+83dBjisOqeuO0A7vamduJca8jqtTkGMy4TPQkzt8PV4504csgfBS1Zjj8IWCYnZ04iHieaZnfn

+wH9rgNSvqDKx3EhTw1q7YDNRqoZZpzVd5JMkq0isz64DbismEUXgtLOAIuUf7KYDwwKnX34IbPrimPt

u0CFXVztwVrZ9EiSzD/b+8F0kHVp8T/ovrV7AK8rMi
dz74AkqcFKM6TXEE9KiQgUzeueMN5dx5klDJ0+bkxn0bzWtuR2Bj0grN9080xCNDPHx4FOA9Tp+hRUGa

uZyjJ3hH2AIZ2vLLDNV2jmR+aRJDib78RVNQDchk9Y6F9+LUXntJqYfb+sk3W4Rp1Zx40kjWuoFQU7F0

lbyFQZ+e/yP/2K/+JHSbVPxuOjZd+XKqEJWQmxtkgE
8VoFsvWPsXjodciCa74mwATE4gQ4phD6Aj1YkY0baZqQSwJ7kT4wrgBf6twK120GY86hRQZGe9oH3T5u

GGjAOEP2eBdqVYtJE2dIpsvcwEgkUWr4uKPTsiS00tVni/632GelluU5sac1bQ94oJoI+Rfy95+R7kJM

SIlSi1Wa7wfiS/19b+sXrWEEBWz5bsUttKh6F5c6Fd
AvocmT+bXrSH0dzLLinDzzEKVo6GygSZXOx2sTICWMLpmHlCTbD1A9o9pBiUaaT297a6qJnkb6r3Tu0s

ZrOeIqnwGw+1ancLFoY6N0jxrLxFINjAgI63oTcaXtfh9uILqXIURPGplhW2Aj5cU1vOYX2j4+4JeAYb

9S98spJe6nzOnzNqb+t0IydLEA+L750ejrhXhUv/0B
kac3QH100SX2IoaySmYl1zyGqyWi3A3fitytv3aZzuPpG5Q6/xyOInLyWWe8MGWcd/BhPRXovjeTb6Qy

OkT9mJnNGoxBA6IwSOO9+XsZ4knPN3ptrBJjo++F2/0tkc2ZxU3Hdhay6fLluXpA0t08PcH6uBTKDoo/

HiBcwgxH3g4q76t6H3ya7el0nrKufoCf609xNeWXe+
l+V8K579PMZ2p14Zd+iZeFuWJw58G4m0QDt+ASjLAmW63dqY2cpbpaEYD9tndgg4hO+c9e1/ZhQAvFnS

nV1tpSNjk0cHLeT87258+2YjhbwRzfjbk8lis0PSnAxT8sF5WKvbrDHo/A9S4yzxI5baVuyfaY/mB0hj

/sNr2kkT6LW72yiahV+6Xf3mnPU5vW3nMshfarqSPS
N8mVeXjHnuIzmlVW7Bw4bMAywanr3ME+PEMmYQmdezH47yTYcQiFj4lzeZALX+hhv98o9/veW/bf11oB

Odj3V+JqvulfrwAnV5zTSqlOI1UFIHq+ZDaQvmU6We1gKnPqTpK43B/Vr3/Z2DBVO7iN8MS71J17UxLt

Dle1dMOTgaryW00fUOCZZz7SmDeZR2l38GAUEa/RB+
mJV8BY/+PsoAsE7gnze4oEjrcD/+zb/cC9Zy2f4xCN019jyqO1cug7GHwQip+t5l+Ji6v2Hk0TJ+Lenny+

l9z59vThbV23DSo8MVqCfTMO0JwIPmt3/RG7ZNIK5q4LadOdKAS/dWryVS+9J6dV+kLkMYMmTTG4sgJc

M61xEOd7AAD4TfGl698hZ7RaRXfXWvFKIXK5mQFyCZ
Lt0Lhwg2U12n3Mafsj/F+tdJ13IY/7xHDYS/el7MjMsTxayLEJXAc1SrCo4u0ZIcwwlT+Tu+C6RjayuI

Y3csjTco10i6/pSPlE0YZQJxeWijvR6z2Dun/ZWJPOXH3O8nlvKD2ZP6hmQOu0cDQxBMi/RPxDwWA3So

aBbd3cOx/jsJf9Vnm0eBoHw/HXR0ZqcNJ+vWCiF1yB
udUZLsc/7KXGF++f+HF29RAbql6nJZf9qYy9AH/7TJ0hGMyKYpB++8o9XZlL24CM2u8xg8bVv/cZQdD4

kSB/VP6mhjkDlaa5c7WcD1XeL+N0+ypYpvr3q/SRcx3ToSzDA/TaI8Y69d6eBwr7h7HjVKanGkkrxYEP

h8qvVQX2plS0UzhgLe/CfpBp0q81ZI84nERZDbc/5F
FJxbs3F3Q/tTw0/5SOcLZs8rGwfdBfPaxZSY8POgmoKNZEgC30brNkuTFE4jEPvIw5nOq6a7cUglNXka

Iqsl+KbZIcQ7rh762Ra9gzrbGJjh+l//+O9cHicuHjwZ2fcYMi2iLQZWlYhm6uZFec2BDWECCj3LE4G5

t24kD0vxgC4e1iJTc+4klK3caB1u63k8Qv8URn7V5h
AcC4sRgRGJIcXuY/nyT/cfbFddQbp3s2FBYFAwxXjtSAQYs4mDFSbmZXRWhwFHCXLZyTcWoRhmATKUJB

+WvHjUuOWxlltUaFIPjiZO5/9ovQaA8ttjVnNrsXKklACaABYJ/6/CFwgCuy43Wi4YVnbRzYN1QZvyR/

Xh46Qw7HK9PPPBNEZP+1J8yDDHLjCRJeD8UL/9/dfH
xU8Hmdhk4rUKXL5S5B9qkmyAuM3K2BT9KtQ6lNl/sZVo4SNApYdrosU5+j7R3fjIq0Y1sHGsTix9ACMh

WfbH1mZdCCjT44+Q8Iha2Fua3qnoSvDRKPE8Na9FVdFyAUYZXIRJpcpzB3ayOdkkrVb0J20WY4qSLSJP

jaKjwFy81j5dxxXEi/zM/vP7uD+/0nGqJeTRTbU9Ij
oYYV9yC2WYHIqO3fyuGRjnVQm5u2e+aym8nT7fy4eI78oCxUf8OCaWRpcx92/27Qnq0J2mhbWItUIKAd

huTIWvSXqXsBwZmyMjndP0XVzMn8PRK/VvN7+0RG4RuywtBi9HIJvvnzXYRZhLutw/G/WX1/ybxE8xCV

fC8BXHGqY4jX01GbfagmFA+J7Mk/GgJTgSZ0/lT3u1
oc4GdL4uIi0dUGFNjcRva1QEdw7n7dgpSHuPwQ8320llbEl4jMxZtZkS7zJlXm8cayi3tauB37ZfpRxc

t+YjjIAVq+psZ2V7KGHvaqbwhTqhSQNasKOlC3tlM+2inc4CznoBboAqVzHdeniosrXtEwEis909k9xd

LWhqFOKVK6iRbVEug8R7NEbf+zhjAOZBoAUmfJT6k6
XucTVmJU1F4oEyg9MePiCMp2JfiKjJrLxGHKdtn6/WfMOo37+d5T+WIMgu1uyOLipGOWQ7LEv6/s3L/S

/42Comt8kvV+IjPE5iHp33FB5Vb0qtfVGww6nPXQW/F35mANM6ec9A75qlsCdJiJpGYGYUfuSK1wRGxd

kxOx2Rv1FPqXtU9eNdAVaaeXEYQt+LxxKxYfE8vfXt
V25XYH66IhcxPTmgh2vClukaqmN51v+uFJuRVVuPp+qKnZODaUXQ9eaeIdXqaiNtrMyES05+17k3OPFi

M/2WojNJ1x0wFGUWvtF6XCRWCD4ubPH+4sSSud7tSVSN5PrL48cXtsP0DN3H7AjsnRz7ycupXxi0Zt2a

+ituP62xCL5/6/E0J9j628HGf/U/0dXb15C+MvJFNB
Ah3I6Fs5q2FnSH/9Ttw4NdJqm8+MdpqhGhQhuNB2G/THw9gJ5FipRS7Bh9MQ3UQpkuVa1qV4WO2Zl33I

seefbj/mmfqwBhtHiH54qkNhfc6O5efXmzu9MxIgGGzYmvqsunCZuEQMITVk7kAMNJGzNQSlVI+aT/rH

TV9wnxYB9mFfKn0ZLocd2p0A+gjAsZ2LAgl8K03IK9
viet+EbYPmQWzzolbUUklt9V4xwQmvLHMCBpXaDD0Z9uwub/+Mroeqh5/yp0memwx2WbJpWboebPZ/IIVL

YHMlZ+WuKWBl6YxHFq+NgdP25rwoYYsFQ64/Y553Ybd+frbiYcW+5Cu61Ru55VWArEvSQgY1jBgVcbka

lJCYoXvuPEDPh/25K/xakN4RxeHvHipYbGBezsG/3W
cxWYrCwHmcSacoZ7gcH3SSXxBHEur8UqfwbneIa+MnARoOWAMJOCzPiGaLXw8Jgr3yy95nV4afz9Y3q6

RamIrEl9OJ66NOK41bSf+6xiu0wrME5UDgLguKCrKrXXPj18V1BiC+WJFtrDsd+FpV+y7DfLdQ0yRFig

pC37RNlBdlFy7SN+N/HT2YVD1kJ/9T7id+SOGCbk22
/aLiVpKZQL2KvrHsaY9e0IfqJmVJHcrvUoHs7b4NzeYWcjZhjXBiIGc92WZrHGh20wDEr8yfhAEwA8kn

k/P33UaboQV7mIk/TVO/6zzLWRfUoN+8D4S7shvqO5C0E+5eAX7rMXUPu4/EPjVDCFG1rAjeUb961+qQ

jgpDZAlTKDBEByB9homLkGkL7ahKFBV3W/OXBxmFDg
sAHkq7gPDaNeSD2PjAEILLhx05qTesvE+PeANJA+LEs264oQ6ywCr6Rc/jW7VOqvZ8P+cRXvi02a+t5t

nQDvUZ/8rqsya4baH6iJyZhQI+KULw6twL0uHx0MM6VQy/lFVu0K8U4ZuG4jtM//ezBglvWokfwibKGd

dcubIeUuuujLds9IhNzVQtcOlJvPNGhDmiu8jfVKcz
lW2xGjnzjCeRkF+xmXsCShUJeyr8Pa69HvPIqcBCgjx1VpXjhTUL8hFNTx/UWQ6OCvXymMlgDtmgcaBA

7JFQF5hdTckIRiCU7chzgaZynQdXDdKBj8+63mqxyUlH/DPtXUlwThKe831Tt+FivYQ9Fi2pYz/aAi5d

y5cCJlVr7ZU+3rLmA1OVUNLtyvs+lhsnyJjJmsZzTZ
bg1YB8Tjypoxc1fnjZF8orOACX8dpiF/OfwrR8TrscmIEaDTgWR0QkT9ucKhJjHvzXZ3D6EZqybzccFR

1WtGiqwR+q/Sz/udQC4asdh/J2J9E570pzLjPri1YCYXoJw0s3GTRMKQGv4641+XI6nRFWySeojkyWw3

M7uwst7rCq+G3uc5djL+E6EGft0bhSrzoRDiJ6qPBM
er6Vo91Xx7ygHkejXl8QFV49NvlB/4R8mwVrMMox94Z1xdVawXeLbilDg75qetUvmDautLXAnMjoB9Ce

J4qgJyyQ1EQT8X+Gi7UZPNWEAyi51noGNQqPfG9kSyOPlTkhfn07VifonYQ/DIVtqE/H0cpq5lT5o1Cx

9v4/Ip3n1MC6SUYzV8azNxyWRyPcIHc63EX7PiudCP
2jKwnwg3L2zPJs3GrpyJH/Xe5QwoYuuldeICdn+Mwus96YKJIIhoi/ucOgKJ7V0hIgKoRYTy3zpqv807

8rn1Q2I9q2D53ZPYKI/ky0DuyL9tmUY0OYuqrmE8LIs70QBGGGw4v8XICr7wdNBRw1jxOdEHpZ03tXRR

DDvOWINZj/iR7RVc5aMCpDTt9jo8mUs+o5v8VY7tyL
FBfp5kxucuw1AJcU+fG5cTMdC9dhnQn3MNeMx6whxHYaBXDndYm5JMqF70mjjadYqk2GGE8mMrT4QLR7

uof+f7cMvDnvXq3ME7Zxx+M/Nd+LlxdtqmoQmHkV1+W+pAOpaP5Ph+t1P2RvcvsY+ws0ikwGsZDfgxOC

Bmry3FtxRHhXzYqJPP4GgLId5NXtKR8B97zbB6TokB
Zu+WllNteTTpHNqN7iaVA8a2wUc9W+OXODyuLk8EQpaTgU9IXdFR3aT0/r70DJMnIZEPKrIbzihb2eYc

i8t9P+OpfA/GirH4pozLw6VwmlaEv3iHeF4Ux6+FxipwNcJmo8F7mUyrfrXHJENQjzohllNP09yAOe6G

G9MpSMwy0csoGdeS3wmqx+7XlMI2MlGc9yXS+h3IMK
oM/AkW2ZWfGKtYqBx/A/d9aihcPtJn+/sTSlYzY3YQILsso/2NESTZwN2+yLF/iLwse1aAXqD0N63Str

+xwqSdycEE7MV94LAXHiAd/5kOcVrqY6bz+13OYfB5hAtHIqTmp+nlGLIo4KXsW8i5EtXju11a0PN0Fi

58FcLQ28ii7OpyU72eaLvw1GIBYhdHabmU8oiGMc8K
pgz7eJw+cckGTRtiPdZ59ozPCTTzZPZiN8hG2NoLeZ9zecoSRPUcy3v9FQYovR4WcEIeO4iqZyrMfAje

R3RSD/CNFz0bIoI7bDQOc7daz3oTchEflvdyqkuXom2F6Z7RJc5Cttab8ADJQuJ5pM76BwxTFZYXG5VJ

pjkxzZNQSr6662n3x20IJ8KUcxIeK0pFZqEMZ+zpju
BUMEgncB+HKmdu67iXmDTReNvTRNmenJNFpPfRxurJylkyS5KflLDAhkxh0c4H9XLGWG6B6QAaWOM6OT

YIuI/HNdH6N/twBxh5kGbkeIb2IcrCbAd9it+m/N0QruDOkHHz6017eZJgp0bTbF1mtx6pDifUSgJf46

EMShstNZLQ4j+1VGN9JrQ8SLZP7YuBzQVANJxdLMnB
HjK1rqjPw3HIUmRUho2sYDsNABevUaLX7oc2LysjgOc+ir1mfiFVHet6fsQx9ORAlWQ8AlCa+zEcr7KJ

VlslatfyShXLwxnFKl0qHPfDG6/dYuNr4/U2d6ErQNfZqGRCo0zuNy0yIs+YUDEtAokuqexY4M78yO8+

oQnU9SW1Zbnk07MMI+J9rgYgdrv3RZbWTBpU+KzwaS
83t3lfNyYlqYlnPce/danJzp4Me815u2iXFG+39xsXYkj8WfSguS53fckhjyhtCeM+C05PxaRVe0beQo

VKGi/Ib76sJjD/7l91u0oBijB4w1KWV3n5zRKoACl3AOMBRSQHe1AU52VKucGoCC5cVWTDoh6QI1EjPl

s5mX6L0YgGEd/0CIsw3ivQoO/d+7Ren384aj6hQaet
Go/Y43oXs8De/UpkUkcgDiRiHU3gauOFFq4rdqXS6RaB9tnSX622sFZ05ib2e8jMzlSzXoWuLv684+sj

4Kom2sWgCfmDbyQGLLhMPqH7+I338afs8RHoHVi7i6s5Y87dmteIe/3fR96fpoWj0zfWM2t66Bn3cgOT

Dki0Y9kAvKwoetbrAtAV3I1PCEsDTBb3gWfB/ZEtER
Bwb5aS8SVY8RLxnLgLncgSAMGmCzV84QRZbhU+OMANc2zwwcGtKxiI+YTOufQ4Sy3DF+Fsu8aagLrl24

JyDaZdZ274oWv7SidCfW02SkwfxFka0lQjC9+mReD2QtM/vhEBJvq5+dgFlkK4KWlS40BCLv4HE45c4d

DB14DGf/UOF4aEUM7qpltgXoQHrX7vvVsWtKZSPDdP
j62ldubk2YlA/aaO+pWqxNQOfzLUm2+PKz4JlQPy7SpNVPpe23MFZDfN4c0jPgcr4eMe+/nZtgWOyvoP

0eYoA55jjrigm5bA066MztamP0NpnY2n2jpLDDsGIwpntYetLbzcG3cSHL2OvNoz5QrK/G25h0ynXxA6

1RjM65EZrcDXQEZJSEi6XrG0Qwyr/Rx/VAV1s3cnTa
eoiG6vn2BY7VGLI2GkiiFhLJW6aDt32MPvpIP6wF1EQx2ZfsrDeTPWfYwn/hf7J1ZEl+FmsWEDhieKT9

Dn8bXYDdkN8GU8qdG3c/rSETTd6W4Tvu4gcw52YRVtdJtYTidV3mwsbR6TK7CUHCCSeYdixF5FFEw160

pvYZwWvzktcUdwDzrAnC+ACvBXNWRfaf4cbLDsuNZn
SMMvMBkI6CiMbOVvw8ue6TQzh+pbpUJJJ5Smr5wkCKv2jhFb//BcUJesa3kYZIgamwwCQrkCkWtOM6dM

dr3+IZMpeNIAn6Sg9IGHY2qr4qd+wSYei1Aj87Mpja5cQ0GxPo5Ts0qJkY12qgZ9gL/Ely78hBa8J4nk

xNECLvhqQMVxDceuGM9Bl+cE4ScxBMAYWRHchgVyzm
3aIeZPJUInT6A6XGbB7OJALYd7jed28rMsM99ahTMIsSfc0+fl2vv6OGXj+/XSloY6k5f67XPAsrBssC

Vw9r8l3dtj9k0huDrtjLe7rNfbPvw8ASy4zUK8NOkV2qdjP8N/+StbbulL91F/Br8/ATz7KmS9ovDs24

FAi8yPWtiYVv5BCisMSnqSf9UlHVlNXg/aWoiCrn/J
kTHVu2KS0fBn0l6h9q8RO+3HdPp/0ScK77YCPWn8PfEmiJFW7HVobPnBGQYudvcogEGyPqS2KDw8eZkM

LOpcinFsVtGgAA+oA4mMeQKNHSOGcKdps7+H0THmwsgF7zvuQ/pD1NMgFkZ3udnC639niM8Q0vhSOnRS

La4z/pDqlQrauBMjbGvjyizAR0PIq4D6KEX5MG22yB
FCgnsOqU8CONSRmYki2hR3bOsFpTzlXWDbtY/EPYKbB0mmuVfDK5RXqDF/jUMtdj56z3nqgMvdOr3RV3

Q95M+r1o830m8F1JxG3/mtN2MKGSrbgaAUADRtZRdkjV8TrVhW4T9iUcZ7KVkzqxi0MqCLHbTF74N6QA

DXfSuezzY/7lK7OGioF6H0lvHSJolaZJNUstBgaz5n
v/FP667G0+O1K5p65cxxwupCog06LW1RdM9/qM8MQMA9D0ZEVeMssMPaDecxHiM78BImYlq+4CDUKvlS

Si/F+HmeiGwQQzP13NQHT31ELcu+ulV4Jf6QNcWaVOvDD9OBKLWcCkqbRm2xAqqnfSlBxspbVxjcNWV1

mbksf2auAbESQQSDkiIs61eTPHGyI+yG6BwJZ791FP
fNgyaJidjYK5/balwkjnNSTnuAx1eOUMTRRpDOz269gwFje/N4O66M5+Jhmzn7NyzRet1YTgFZoCi6tZ

+Eucr0dFXI5B3muMqzgy/nJ61i9bt7iBou9/dDWTEFQUnUHIjQ2DvHuSkebJV02dD1FZG9EOL59yYtr+

aCAlurB209SuMmlMiVhG/CtlhoAD6g6zUJ/yGhtmPE
1mYHbFnKScjob+j75CnF6Dx96V/N+XtoPbxdK3wCSyc1voiqmFJDPqPH7osOURDUJBiESEVTl432/OpS

RrKYV4qDr602IZDsgNjYDIeX6QVab88rBnwX0ExXlSvdBy32P2SiKG7AbcOqf7Moer4uNbTb/XnDwLPp

J/ooa3s7Y+hHWjn1uq0qCwvfnEaABdbg5hKgrsvh16
pTTC1Riet3Fhqyno6B//NvaAJpjk5k1lSesboPvLoZnTqp8s3dPzOKQe1Mz7sgVBSiZle7fEpqyXseLP

o77nz6+fBXH6/dVkBV3UqvOF4+ajXByANZJC4Gdc1YGiU7oPm6KVFY6fjc83Vm4VHdRPLpU9ikvc7KzP

+gQzOZ5maxVkppDZWj4slmLOubQaKRt7cdnmI4kfZc
2CNURqnwcYjZMVxmcEbe8K/GYYsH+CIOyanzbac+lRXro3APzqe/Hn1U7wVdZfWvPr+Hwzk736seoEbd

cNfBNIIIxA5M/hzFXJoQSWlTdRY/l1W0NcW4oV/DOf2zmkKI9ImTL2KIRYHp+Mw1oOrxIyTDd6BCPdhH

4O2btT4t7V/ScgVr+Tbi4KXaTBHjwRRv6NL/6dVhrm
d8qBDB+EY7V8Rxr/6wFeLqS39/zbZjLqfwvrgjFAEBh8hWnDTlomhOyyZsh/SanIQ5XVf0hTiFX8+uGu

WKfYfXlAVkoZSVQGnSRQeMuAeGjCd43sSHUu76cvb2QNSHERadtACUdXp0e30eDkNb8GDLS4Cqlvl4px

9OyxqlMS93IMozI6/G9JdKdDRp8H3njC/BWzvKhef/
o6yJPz4I1Wiqsg+edCpLqaxUykyS/7sjB6cAAA/PZYHuk3Ao8CWt0GLmBZz1AYnVerpDjPZoCbWS1HCN

5vZ/07i3oJxjEWXtZpcpuP6pD4S8wjOKvHMdrjbngLqb0RPNVt2j43v3WvY8WE5/4y5g/tW11KLf5Y8a

4f9ubrCPCZUBIpj1sx4UTdajf6OVlwVOwfx/epSf2s
bhZlLB97B3kncdl1jfrm5kVVXH/mdDRls78sJ9rh2F+3lxr3W/+3vgASVieVwKqnUmxncqWhhzfHKjxb

fugzfh+WDn+iyiY7PMTNyp28DtYWcTDnb1sQvoDGtX4IT2EuN/0HR5+Wdq5nmzQHCs53fAv4X6nWEB2v

kTlREInBlMx3tItyIv1J+creOmdDICfi+x20ao3lFw
YMTHldYamvGvJ6iOTRLxbuygGYi9xi82JEDN8yMBNH5QU7bEcK+7ZWM72Bv1TbQwXyVv5D7VKzdFqfpS

VrjNuENupNYVGimotDqQzKZEZ2S98FTF1ScUyulgvakqAvCUDgtS6F6s/P49fxQGwMqJXTIe8DEQdi0I

kevD4Zo8un0QEB/SgQBx040GesNNfq3J/BjFqc8BPa
UCbCR+UH/jeWb1IQ/+Or8Yv5mxrYfssFHX50OIix/zQwCvf6gabfu4im+xA+0kDRSHPlrAZFLm8mCcy5

vdP1JQ9HWILeJtGw2b6JaBIpRa+afVx9lkSvME8e/2cTE2va6sRQQD2912BkPvfzK8odGCyFOdrIed7B

qW8U8pvtvI1p1bbNmZdyklDTzoAnkUts9+G1CCstEL
5X5q8bKL5S25wQlKI0oeG+ypYd/ZdLr7y6a8GNbkG4ztarSFO3UEReXKsU6o3Y5jHUPS5Pp/W8hcYBru

8HOaquTbSa7X3i8jhD1pb1qa/Ycr1F6bVcDnBXDcN5cDHqKd57MBVqtTKXJie+VwYaL+eFYQh4UaFFzL

cIwFIlTOkCNe1E6NrPEmxRykczh0bSCo5jeB5tN8KG
RKTEI5aYO0ecaJZhkd+Pp6pBAXF7Gu2Gxcne0NrarRq31m+NbQVbdyFO3pt5iC2jVpk6dY0Okk/2qL7T

0p4CCJpac45ScD48/t4+6fWx+oCx6DVDXvL8Qua0LoaK9nMuMjFWxinJBbP7Ie4UqdRiCLBGFS0vCud/

98XmQ1FOLMk0M6d+X/3zP+ZeOLn6vLYAZpIWiMK5Ya
yDmVZMoc0l7H9iuPtfCggsLgYtiOv8XaHODHI4dT1s/OuIH7hFKFR2JGAPp0f5zP8rcCk0BZpuawOn2G

eW86fKq0JgL+iId2eNha9xUDIO8E/V14icz3Qqoa3FUdKeVc//+j8s4clo2N9bB7BZ52czqYJPf08Btm

7p7uA5ZqlFpIlP+DfgFsAoDZWFWoOy5eSolVWFgu24
Ew1kcxzrX56hSe+Z4Xq2cqP+59/XGC3kx75CUiBnynpTY5XIWkyKRof94xbKr82yfnsNiVBGCNQpbeok

97rgMbtW4vnX8+ED2wTADOp+lTfLt1jnf0bWjs6Wg9pGBi4QWo475sX5/gCeXVcPUX+P9jEw5KJ+noyU

ISjTQK96RB284KF9evMooaOf4vYTP+5j4VwkIsI6ly
o+VjQ6nGnMkGWb8D8zVx8RU/zoezyJDTbyKp52vcmWxlGrnA3xXRrBdX9x80g2gR1Dlc3f55/wzd+bHg

PQcT7HIGTZCCvfeHcKZX+WvHyhID/t8jHr7IKIE+kMReQnJeyygO5kCo+d03G3tmL5j5p73FRXxr6TSm

c1IGEQiCrRGqU96U09i13mHipg1XqLWZR8hh+UjdXW
hT31jOFNJ1S4TaG1UY3X042iMjIJx3yRHclqFrAWJ8UvNysaZ/+q02PaJhKQm5aQmm2b3jKkeJbW5Gn2

y/OvpJ9AmvXvzndjrxAEgHmahLx9j+I5twETt0vLzj1kTvH44y7B4rtaeof5W+Nmc5+EBxxO+CTe1gCx

NwDJBnrO1eCU+lF02qty2t/9qNNlQLZUerWcof2Ozi
vYnVnMCXEIF9EN5JDKpXpnv5kV2Uye7HIBUpkW003IxcvQ0iHPHpPmoNvtkoNlcpd0SpJgSBrtnpU+72

X5smv+yOOTdXcKL6wZt4UQoCXVVp1/DQQcd9J5bHXLqLSV+wbw6RLX/Y+RWR3gNu3akQJB2JlCdH8f9l

S+AOTaRYPvTFgdBe23wxusJb71+z9l/OpUB6KwYXTi
zvoTSuKVnK+Cyc9cBCFac9Lkum8U62N/8rTgelGmmEgF7sbxYTcMDz4Ek9hrEmyKdzcJEtiJ/Z3ccDap

gqIDn43gdYts2zynrZgZDWIFQN14sHS2n7O6fPtJeMPZHPFfnStGVN754wTl1C0KpTNvFnIi9sMDUkRU

ca5ATfOEQUOwhPczEXBRpvACi2rpp7qjV67Lh7fl+n
lhzm6Mwo5K5onb6a0HV2U0H9FNzTTQHV975h0x5uiCxi1jcYR5rCbFSKZXGR3Wjm5D7xtbJX8s5R8sQG

An0rtELIzW4l58aufr1JXbKHy4q/FnbE3Nud3YTtLrdUdrtwnd9CEakpgPkf8uTJ++ou0Q4OxWjp92Rz

qhAST+IJDoWex6MKlY/5CQ81Adx0fVxsfTw5mrdPhL
a6r8WTuYPTlPvrnHNnU/Kpis4UGMZoHQQ8XyYFMxQX0rqdtU5wpxH0gd1KH1zESA3D0OB6+6mscMSrQ9

tLE4hpHr/U2UEowL9uv8bc3zK4kKt/lSLa/uQh4Zxp3w2H34OMEYOEgxx/3qHj3jUl9Bson2dThc+iZ2

JleEN3lJ7+sOV4i6dVr4C4P1mrZM2Hv1D+/7XuPyyt
B2FhVNFCKLse5B+PPXzYZGUzYUBwivEF+J9Pd0aoPE6WtMtknmft9oxJnlt6Nwwl8mFTLUh+BEpjR4/k

0XpWayISB/4gC4ZYxwnzwTfI+Pn8i25/7uBJT8Wr01gFwg4zjWQVJmCyu263q26ed66Qg803mOV9ZOdv

rLodLz7knSOepcc7y0+GIHoVSB/mbi6hRbpIMKpHnr
atGc6Ut9aLfFz13eoZvqoiN94YkN5h82vHP9T2gFs8Fed4mFnQLkZPGRhdZFnVqEhBjH5a1I2PeCWq+d

vIw6KM6te7seBiFZogH3q4ndjhA0u0w6BBkHwwvvmFjn43zP8mzbFgFpk9TawXJTzD8rtRslmHtzzZSd

LHU3mgFybFqUyHddGb/9Fg9yZ//6xnRF5uVNZqvuST
l/uy3GOvuMtK25TJTeOq65L9vFF3cnWJ0gLHx10jbesDNAZeTkD7xbuMnXDjO2VqjkN6Lb6aBkgLj8dK

9NbwUJ42OBnuawLPP7XdmHbuMfyzqaPu2AIbvpldocryCWJHNemuajCYu5Qz2t6ic6biY2eroR90PPbv

TJk3/p7LqEXx8CHJwRhS/xnGNvUX8g8H9SIx+ufxl1
/xwadXI1bB1GXR7d6hnDMItSWVYRk8EwNRcPF2bVVrlXGmVCl7a+vViwwVChnQE5PJhlkIhjkAWmBjLO

lIZ7AP6isLW3qKQwLA35WoPSm5doEWB6MiHYNh7ZZDJtjVJ9e5qO4YAR+wvXewLlp/lk92LVf79Uw8o8

LxdLDK6e3Ff8dWvrD3YRcJhr06z+2t+iN4/MzHA+vW
wbRjS/hfj4UZL0RJ2PMHRSZwS/8XV2KbfxjWY7xyUBHuGk7eI6eAUAnyyeDJSUAN/cLJoIblcNW5vX0t

CrQBStY9+MUo7+X25xQmdCqXt3bG0rFIe8MAv6xVzr5MDJT2YMYtezBR8upyTpGGYhDzNO+8Q7M9467A

jNf05rpYoSV5TG3dKc2Pj//HbfoaCVQZ68PwHhTlwB
97KpyujDzZCUxsJX2FCD3a4gbBb2AXNwz3jGgzWGj6p4/TatrEGTcP42p/NljHKYWvfrSUGkWX5kWbIE

e3ezxaOFX1mcwe2jxNoVzB4IultDqO6YjM3T61XRCv0NGkOCi5cP9Z1Xgtaq16SRvS2JJAwtxhmjv+3c

VgsXWR49clcauWsNcpn5UIEQoA7UApQZ9hkyU6giWX
4m5/DCcbSrB1ho+8w4b+hyI0mzGMRKv+Qwdks5MQiPcnhiF84H4GY5jsi9D9+1ikgJ/6bSGqAtRmTJlv

Wj3MesMwRU31w6yoMn8FAPwhQ8YYfowiOHnnuEifxbNUVkNQ7jkis0LnoXyQ1UWfVEKuvKZy9kOA+Cheli

i2NINQjLUZGqjEhXxzzUz1gg+h0nXkSUJl3/cCHaW7
TXChb8aKqj5hpGDNQa7bgUTw219bdaPWlK2FjZTpYQ1nWmMa/ZqfYJr5czkD9tLKn1sPZhptT602D3kN

IZTbqe3Gbn3ZXkZWT7st/2BeKoYRadtN7NpNizOoYoOBwh86nq59omUPWvEH+ek/d9FlLtbmQaJYhzA3

spszBNwqUmK6oJtGn6XrSZK1Zwy1ghH38vkKoRSmmc
qg4rsdMYLyt86+sgn+ogoPWWYY5x2qY0O4U6GwIjUfE4AiCZLy70/xzPkqr+MSa/eLwTS50sjTEtPXk8

3dBm4RT56zmtrNZU4yDSDh0cslVEIHZEPwMvb0cZ6nHOO+D6vpjwKRnvsvb5XmdtzSO1rLi+vTG42pDa

8XSRe9gNtcggG4cS5Sat/MmuO1YUccO2ONKYgDntVV
9ndaFSwfKKr5JBxOQXclZxn54/t7rD+0e1t7fkdp61jScgpGEJLrSYBaHRcjPKagLBN7XC4hjfBoiwNG

8mDid9G1mHC+aLFYQ6fofylTWVG1VhnB+2A3hfOQoFfN+NcjuBQNjix70Wq5uQB+tdzyoK9sx/L87S0V

OV6YT6gdg9cNIIGda04207pSQwOFyx1xzT/9EK9o17
D04oWz2/3K62T2bCRmgU1xdJdK099w5F3s/g5AAx69LbGgHHXiGmHUk7Fquk0Vo59Yoxo82CGXgcLSKb

/MvrQwqO0B65K66I2/gfUO4z6coGcDlkFHHwYYK906ABL76dAXzJsS1VGgrR+zo/vFRMXVFqtq5bSJFy

cVqFD2kekZGhwSswsU40EysM14d6imbv81/+k43K4g
2IC1vuEixzCnK493JmDV9oQyHmVZtqg2AdQygRb79y3S3KkkpTOI9QzNtV4p4GT/gyL2gW54jl6hSqt3

krcTA6CCMtXPHXMuyvOBIJSOsCgVsf8rhZ5znPBrQCuLngdHmGwmcjqlF/Lv93f/1/Rp9fv2rat9wM7w

90p9dd9inud25wlkCiXWOn4kz5lrcjir2jY6qUyKxj
7fL70/XKYw55tydPskD1ay6w4NfMSG8XnC1cGA5UxP3oSRev5Uxnxry1vfVCLDtXjAjAz/trloaKRgmU

G4Cb0ousY/2RiE++ukZUjGoJNv7aDNQlwMeNHIWQ8NM8Y9I3wrJuQgnS/meo/osbJt1H6f9Fc5o77v8S

24r9jWM2ADhY4G9nxOrDQRtzi2PCPMfmGreoioaT+n
SniWDlXo8nx9Xrmals0iTe35ziQFIB/7GNlUmznVWi4ZOYFmVoHZ8O1+mY88gp3lkkPuArwlBg75C+9u

EQV3loRE4QkjYU0VCfpw5u7qwolzhtGlJnR1+HbFs1ZLYp5fm9rW1l7nTdWeZCnP5dysu7Q4+JrQfPNq

EVFNcZAPy5PZZqB/x6XK5lLHmxe8GaojxrFN50PxqY
7T4RKj+8KpIO85knkuAFD76xZAZnhAy3QTM3oW8/nwdEY2Lh/mxJtBI54g9K+Qanvz786F+CfmcqzJYj

ceZZonze52KFzaR5o3/bzUSI700vK/5Y/vegEl6flWkOblpcHBYztBC1qgHbKBDyQVofmliyUl1ynNb/

Y3/mog6ARpnpytlEjfDBZnurNgFX1EZgqwmLKGTSrV
C1UoKaNViLya9sMh1zAmBbRPU+ZZ9ebu1V/YWlfLHWiMf//xUvHqs9qtsRL4sylP2cmcMwNBBbJT80cK

Aub4lRAMJF5HtI5QG850gUH5FV9aTEmsI5T5RYWTt91xP6y6yFMZltR8WL8iKTvOMxH7hROXDUQGPvhZ

hha4a9iFqg2uyNM9YDuiv2vo9Vv0hqLe8DH47H/OIr
fxKFuq1LUqxtMN85/OPPR7B92ha4KVo3sFQi01owgai+E30iKf86HzUaYpwa+blLDq22gMjzZwoCWvIl

c4eZkSidCOpD7T5d5Q/OUC5MJQp0oVZMbUSJ8nQ1IQfCAu6F5Dv0Bfus/sSfdtDeEIyDIBWIfGHg7/e8

CRbCVgpjiwpjtJsK7KScJYCaoP/rXxjTd1GI9QHLcV
dbQHg8SH2oT82iobS6ag2qSS5bZCmJtiHlC4/Qi+ErXObejuT73ihaZ71pzbbZEihNEZkNOvIx8kjrA5

m/XvxG/RR3mcswuRNR8BYX4ZxbLSFw40RVnW63liESlTsV3+ms4g5UHor2XEnPqDx/qQi4pMfJtaz8xz

ER1GLW9vQsiOMmM2d9038ncizqgEX6LoycxQ6cIeVk
mEoZlw6TGHMnq+yiVE9Yqh6RqE0OQBXmuPhqtA9NtLW5/N/lwg1d+pT628dIedljwjK5bG3EVS48VCr9

HQqwIkPwEEuPfKeqNiKvFvWI+VkzFrKPuOVJ6dtxxNJAZ5xF81rNKeepRWptf3FFLsrUpUAxD6u5iMbH

ZGQ1uUxi9gN6tc4SrMsAwbM5ENBZgJdSDyk9ufoPlH
fVPbR4b63H2N2NfKrcRoYE+01mzYGtRbZVzWBn/DSLHsXiGFT11ryvHqeWm2BSPS6fDI8mvotUvzeUSx

4E6nV7IlIwFxulRwXsIiKj9Pw3KKls8cZyTb2j/6319DaRe1q0CCreE7no7Vi6JxvdrF4AqxqM0IMrB7

q53q5h5cVntmf81CXJ2nSkZYugvkc8sGi1KE9D6ilp
INzIMZNHdT5Z4BNWC2g3icDPvTdI2sbGlur/gOBPcsgYklNmaiOJj/fXh85o5+W/laFcK6714u0UZZyY

eioJXD8CTEcRKNEB7yyytItlhJbIAnNrR3dn+lWhfpIvQ5oxhTq6G6W/ecD2TdpB5C70w5MPx1bcKAKf

brX4CCjc027X9sUVJ9GDUK6KRX1hZMEGwT+qoi0fu2
oCZRBuUkgnYCdKcRhpcI8HAvAUkSCPRchgMH4W4b+TKb2Gn6KD822qwO2iFcF3d1LRLOErQv/ht+97Mj

+LcVz1Wo71+d9lalM8VFfheERp3tfW1XrvSow+38e/0V/xbcJl567CFZ1+x02LlpOfI9xwZFqVQ0gFok

3j2Dk13e1u3fZuy49i0ZbjwKCjtpzN8U+oryg24b5r
7KRuJwsV8dTlZB2h7esXtX1WaN3ZRhaNBOwT/tTYgpPJKb67Yxz/Eb4AMAMaGQbGF9OgrS+dRumJosCA

kghjqmlh9m4/jHgi4FeyK2UdlPbvrH1Y690hKo9ZHGTxyzciz9bSbnz8fOQIghL6K+Sfvz0IBlBkQOCX

msmL0j5M2mh8M08k0ZoZHJlzQRO3qTDUcZtSc+XlAE
cwo55NeUn+Strvl2LvbcLLFbCjEodDF3tdJdCoEmwkn+z7S827s0oWF0p0eYAF2GVo6hq7HBqkIemX+o

LOCGLn2XcwHLdKWtVogeZHS18wLPVcfyGksOhoqcBoETbzVKMMWZ8eW80e99/p/jlOoRjHM/lDwS/Sgp

9oiUtdygTEiR8xe1cnPZNIyvn1prWxwC7fR1iT/msi
MAGDALENA/Rlbf34+ZJIj+8/5L17o4Wf3v2E/ilAV0DzFXP85N2NLlNG6l4F0WITy0bxdECKEiZeFLjrVzCJXP

ox9qH3+HvFOgubeZRZgLyH3k2XrCO4/LaMrRT6ZTbEuRBK/+1gPZaw1liCS6nlmH+s8lvV9reBduEPVW

py8hfEfZA7tYbSxnMZ0WWPHfnyidPDVw4pSZ1lIf3E
pA0AEBeqic5biGVbC24MrboOpXTjUBYC2Azv7bWN03uFAGRy4sM5LDchOfOJm1XqdoJEdc8jFX/dBsY3

qD7hxQBU/lXTvJ1Pocq33fSuQBIQyVtpo3wVqKZbEmkExI8J7gr7ghxcJYj/c6WvPanLSnYvhGI0TjTX

8B0uv5pb2n0cHv9eJnd/icySIMEdA5X4axEmY2KWVp
/vgSKWCxVq4ur5SrNkBblCFrU+FdfTz4geWDbU/FssGnY4Cah7Tb57vSSady9cmQvZ/osozV4/lJXwCC

jsmDJ4mZyx+4lannmdphmtF/YpyUUco+0xFYpuwbdBMZvR88tdgeweZ9VgxVYkp/ZgSNGF2mZrs1VElu

AKz8JbdrPw+oxU0RJl1lYFB8r52AssgRcejTX6/2n8
b2hQ0eB0yFebxTu4XmvTPMYbs+kQ5GWJ1oVx1JirUquHRUJN2Bmhb8KkNeyBF0OO3lUloMtSFidgesRO

L1JaDWtomDTL5AIsNTL29TDqZFJzapU2vYsll6gYv9tKFhBPkl2My+rMzJxP193woXqycBm447Ax+Mtj

nTzmLHYamYoAerzHAvoNXdhkHIpbbe3UL8weEOFMDt
wOhy3B7GtQkmUcrPSjXTndiOQhGG8N/pCZ3YXnJMLXTq9PqM28sK+Ae/3XCiJTwBJb3/7EScPVGqKgKF

npejfc7hgvi+sl7C+QtKRWNGW8kytw1q1WCMUymCtLZytBuwwotk4Ys6dzXhphkIO35U+EAoYmwQEysi

An7tkyHngXgCjCk7G35e145i7ku7ct1RTpkK8HTZjJ
r6C6yWiHn0kuJHMq9ZCszyo+acLEys6thzGRpmWs0xG3r7QnB1Pf49kuWOgMVLpLSDAYNLYZlgztiSGn

TooKChbOnHB2sdOjbjSh5bKz8DSqRJqZSvR0VUYM5/Pkla1iqlR7PauqPmuET1Y92Ef5jmbC+r/gRlQE

oRb65guvw1L9HRiY/QzHqqeJo2pSoVHedmhnM5Dd5u
2iJpnZRQwEQ2aZETfj6Z3kPYpZKjntCDeiwB2aQ6i2rg9y912aHnoi26+79d998mHfGlsROHVBvUmnGH

S3pyT/qYU8vmk6jGmTNsEzSNocD6Bv/IVxYVd1MQhRFXQx/PqpUGBv5vYE5KmsG0vgDg2NP9AN3Z2zgH

7CsUdXMip7sdAxgKyuyyS1kQl10lHmoR7tbTZS/YZl
gLLPMizZxji2HPs+nyxH2DbUVKlEf7pCl3+OMd9+pMzQle4jqhH81mnxVkjIcN+KWnRBL49+Kd+aXaig

X9Rv/Sb7J8A5Ar8nI19CYSKqlUeIYhPqooDnpEph/388zdvQ6gJzf/z7io8WayeOz76zVeWfKcW0uFu/

hLOhuaNktWOIPgXKBNaaNya4HcD6az5ki3PVZ3tRPg
fiAKvYYRvOcYg9wCY1mGsvGFaXnm2kiLJjgeGHhvIwON6mZdxD6OZxZ015xkBnYXTAo2EgNtywHtDuxW

Pe83ev6okipdP6ElnWwujvMXTSFu2ns/3NiyC/CBBxEXXIMqTtkQcbdRuo5CVRWeMh2vwOn1xB0IZjpw

80Owz7HX4To1vS84QWNVYJv+QBdExKPTZ7Nsi59asx
R7iExcrU7fuTFRbtcpXkaTPZGQz1SSsrtga768XSW0R1zwyV3/ktxsD3MRXnZcYf5Bhsjc3wmRjb47KX

QUbNVcROpkoeONvSkfYf7Qfcb1KYE6QaG06R0fzta8srKyt9YnqxSTmydGZW9qrGSdIqIQcIZLJkKOu1

aVUCU3qLl1UrsYUOmHEbgw+1q3ZtZrLe/nqShzewBy
FObbu+mapHsXq+8MIpuG21R7pD134qEix/imOa4ltTGgIwrfYJs/FxSMEi93X5HnXtlZL4vE79AoFuJP

GbYQ/W3GkZU6e3AgR3IUPGA/NCC5Bic7JUDNksAbSLZC23b0QfnL4DNuJVFBiGW2n6/q+NwkjMvcpztg

GorwwBo+0lC+ZCWiQV0gM6UnmgM1lrzRb1q4w1v3HJ
GHUbiIQ72oOE0tDzq0WpqIJtGH0HpXY2bvxwwfXUs559jqDYfmypRiLAq6UiM18UPbfY4VNXckB5t6vH

t9ZOoIrLtH5SR64oJ47lYKX4r9eIct1x3jGXH0NxzQRth5+TNmKbqGIpqU63MCgJuYcnxl74towAW7zx

pCbu8Kagb7bZaar6W7Zyz5BTBN3vS7k3dxeuzLIe40
dW6f0wieviZtzW74rZBZoZfJ1epiIawMXIpFlNTh8W/jftvxXKw6y7XTYeXaJfcDq4MElH7a7I+MkZ4x

8ReK28qWoMQjIisS1syUb37UBohOJD2da0rCWttxy0n0q4Wto8ebyrRVC+rEG6wIvQVpijM6tBg79Lip

z/CNLhPDZWE4CGsfFb0avF3T8WexmOxsi4Svs2Hi/y
m2/wZctvSiwliIWv+11kU5ZFdL9KD6G+1eWee8gYLhh0it+2NJR3Q/gbH+51l30jXTa7RtRByUU4rYYw

FQ9v+aUvjBO58X/C8XwOARMSasG2gq9q89gFXjOg8vmBWIZQ+N+5B1Yxc1K8KgJe0Y9CmkrsfBV0XCEW

FVL94J7+/f5l+TJoXz71PkUI1W+DEMCYtabTvVV9VW
bVcf1iHlMxKbJ8OL/2dBCqURltMSFXoKmvpxEq6PeQtyBpcEfuqmgUus6uEAKnkn5VTS0d7gGs9bBu89

Yt63Hkw3XrO8qlrhxwEwuIor9qpaNjFNXr5QNnM+ISNf9FY4coZy9IolYHqGbchCH44UJHkXxtj28LyJ

jaL8IDfNqAGSlyh8HZg3ZZ2W1vuQ839kZLAYrd7sbx
oSItFc/85wcwbm4v9MJlg3nlgHt17O8ZxZeXbIFK+SngD7Y8IVt06p+/dD3jHnaLmJgCS/2Q5CoyZKeC

iMa0v389l+4Kmgg5g0fPQLrPPnZ7bLXV+ncXp6OW0bzwj1THQ68+Apw1JUGnE9+reCdlz4iQjIFA69Cp

n9lVHuSayctGRFM4f0r//KVB/7lJUOyMnGCKpev4rP
+kV+JUHsG2LFqn2AWmXEykv480pvIxxXdJ1IwhA1ahed6thhwkiXyk6Wz/pHgiG1ZgBLYmsx5C3/kv/0

/aDlriN01hOtRpM8THk19CfgkAjQxVVSbTjCyoIwMwm2gn58ZJcXToZ/iHwmCqHDbIlh3h9UbJvMQofb

JLdJe78RA6DQKbUFKgTPBaGOLsySho/bjZX1o4a7m4
Wny7A7OrzHvYPlvwSfKGacaaGSc38Siw6pRF5VRzb52qi9YTCF6rXn5CYbweD5AU897F01SlCmFV//oH

oDh36iGPMYPpJnm2Rs3zQqoWrWjt2na05954mdsheGAToynBz6xcrrU7Hk1i3HeEXPQViOuRPpplB0s2

cU/Nmn/7F3s5VWJa29CZ4hp2INXCkd97V16GiR2odE
5KTSIugXEcHrGKtvqkCgwtQ8f4DaGUV3+aaL2DJQXjRo0BlnUsFiTb+ai+wKoVRzeXu6TKFdtJYYo6so

VP5DNoCXLaY8OuZDGkAh7mCD+HeVL138ABe59ZmV5gpxNaMli+c52MRHnDodwg+wYkbLEbLSB0bTPrbI

6My/t5BgmbyXaqFCzHdoOQLwDePYS4yDlemEoF5/2Y
Okb3vx7Crdnf+r68memwjnPN0jH56e0sGt3j4QxBd63MHxoofBn0LrgwVjcA8xC0anvOj4h18qun8RC+

CySMEKSAGCukU3Hg91XJA0WcdQou7BRQaNE6+Tm5QdOPl1rmS+uB2Ae90Fh4PddtV9UnmxSYuxPz1i6w

aBKFPtgI9aVmaD0CugCFVjtagj62TY4FQpc1auw7YN
isUiq2n1Xx9RnhSE2shRhm97ejsZDHvJxv4vnKD681/MZkBcp9UsRa6+7ASLrm20HMvhWS+2hSmdcmVf

qhNbY2135p/v7Zi/KUPI33dxxLcCjnYIsw4qzVdagZQXTBvjQxtlYrfBB7EnebnhC2orX+6XDLzAFsgu

6mQyiUtGPvJYilr5NzIGXBuchHoOsLp4SqvdvKkrEN
vtQZxvE0mi4euRql14/8X/+cN8SUYxV/jJVmOg0QT7038Qi6WCecSiEpt4AwxHcqcuPalnUhoYWcid9K

6cjf0Qj9jWYxLE4zuq3ctJb1TZuWiUKrTf5XX2ORlDgvCAXpQz8UbqmjzuXiyzgyRigDnwNBg7cSXO6T

bGKe7r4UZO+tnD1ALyRW5tfCLt/iCQkE6EmIwR8YN9
68KA6jfW1JmQY2fncQZxEdbPoGPHFXemxs/f7uwRF74IbbbyJjMOzA9HE+1jt+F2r/fiTZeZRxxSj6QK

iqAnrPA6fH8Z/LRM5Hai+RL2CLqoUDaqOmG59+id492hUChzRsrJE62GLMMB/wQWRgvYhZPk8oBKbKgT

EoJrHn9fUDnBbopd39+gmQ7wqYOwexWgGHHVBzIrxj
n1dbpukaH11/kRPR/nHyRkFj+p8ckmxRm1ehyTeUJLqJVWqQrZqdyRKlBKqBBADfgbGEUB0y/+XTPSyq

TetdQFUOSJweRIKZA6RY+mnD6yrCXMXUcdcWrTxTaVicV79uOz09bWrTBELQmB/2oGDvZ/vUAdDeY88J

/t1ZzjfkVFKHjLod3Fq1bh7ibsF9pqOhRiCUqtz3Wf
9hZ9aApVdlfvHo3psI5JH/g3JEi/q3SVt2R3PKOsJAAhSSdvsRhGoi0zwRKDCzcP3n/daCHE9D2+ztiK

Pv0/MHj0Q+mpddj3rE9U3pnyyQ7iEE5ltN4h5XdF77ltIwFjkcK/pU26yZEbpxKhVBmF1ATg68vh6n2L

ATS8IsEOvIXbxq4itvWOMNi//Ful3l+X+tArPqNV2j
Sd6sSi8E2GVxJS0Qev7QSDp5vqD6CZV1jT0NbI0CmStv1aLDVmFBMib/ifaC/eRr27Npcs9gJqRuCRFL

f7FTzGo/1HOymyhT6mJACH6CLKYnql9P2VK05+dM5P+6TKaA339p51IbnDF9KdeMMqr4Q9uIvq8R2MD0

IrUvX42qb7YEa47Bv+LiAuCueIvnHCabH40XzML9Wn
PcR/76+E+oQ6eZaYf+59+FGCL/3JM7Mc6qtw0JmhvlYsebNy4xCfXxfjxF/oNC5oTVNmDHt4NfGlToaS

vCczm3zrk4lqODNyCu3X3RrnCb3DCn9hnZ6AxGVEiQ7OCX/FgvUFsIK1wGRS3eQ2cTAko4e8BsrOf1Rc

2Y4iMIGPfPC/Jje94xGfnK1Mlsm/Elk8MjF7su2Jk8
OOERBiRadGeJ0sGzmbkfyfbGwjq/YmzFZMuEv/b1EhDbOtKlf7+PXWgli2n7YHT1ItLKCO2RssUxm03P

5bCyMI20FZ8bDAOotk1S+PjjbIidv1X7OcEXf2RWeXaYHVC9ErQcxEO0gL5tdVFn+bhvqQn7Ni9xlOn9

xbOxOJVci9lwhfPAmh7L6s6GJyDJPEW5THDbX8f4P0
wB7dU2opTFhT11ft6dz3zKZN++hMioPsWYA0JIefpnJIYQYyY53x2lf//ZhZJ5cMATzwD4UxpeHGslXD

T7H5C2nNnMteIFFmBO096xiI+k8o/0diUH71eCefD0EGpQpeB7WILVoLk2FRet1Ox9lxna5S8iEQxugv

MZjYdvqXXZuQ1MTAVj8t4APyP73ANROk7256HgLMJW
+dJocJGSkNIfhkfrctukbsVSMSbvs7Tm03ftFVGyy9s/yhDKx5NKZo/NJfzNMW/ernYOXz8RLvs9od0Z

RGanTEzCi+8IxGNH94mXwd1FSEFJHOkLhHJ6FWPsGKjezaY5cp1wB7OlIlHcoJmHlxbXicxM5P7vST7o

gF24mDpPTUo2XuMqtaA0BaroSw8zXJsKis4Mmcnbrt
Ndjo4/pnYWQ3tk+//eLohtQ3jnK6DfY74nLD/1cWuj/NNayxMngIigN46W+z5DYqByOUYd2mcNvIm

R3qCfYP6LTeTBcCoOSgYS/tiwewOP3r0bg74/m1qarmN0VCYbD76LsEoUnM/NpKdB24ZEMfm1X7rnclT

scEyjPETQXb34RcgKUC6sOCUJQ/lSrldY9beJwCgOp
Jacky+3BCLp8LS4HBO+n/oCSahAQvrq8DsQmPqCJ4buugG7B5Bx1ko+SzLABXNEDg8MSF26OdN7nHXM3OJ

L8bWN0h9beLKWArTwvf+F3giADkPnj/mQ163d7hhbqMoar8/XlVhHEF0krXdR9DTc7Cs7zv9SESr6A/n

UaXvnWy7Femjc9WFdbyUpWehOJjNVAn8dg8k3iVZ+b
oZWtJHzNJWOt0z197jd7/mRJ2KmN463PIvN13FKcb2VC8s+tKkT5bJ0yIyUQWaa18g/RQLwloFefDh/e

ylzLqr4aM4b5cHtIfNyjkF57Z7aECcU3jcEtPxK48cAZH7IHr6X3xx2wGVGKy7yz8UP0q7vmutLSxpRZ

g/57kHtDhe+H+/zWUk64j7otmbMz26FmrfcD45jIAd
shpd8K8uMQdH3wmQEJm6dsiX2eT6PWXHMNAFU4HJnKYq228Jp9dhezxKFDD0JP9xyB703vnj9mxFD1Q0

NjJvcULGp84EVfmn6lTh/SSlZDeQ7dkX6/mCQy5NDYaSgSFHP38+nOR1eIdUmSy3QlfvQQ7rkrZAin8p

0NgyAh1DBS0iGceglvfHjoAAn1bfiVtblaa1cLoh4g
fW2xraIk6xkoJRKbkt2QfVMg4OPdIe8U1KD01p5u6vh/qvQ93tcvUGEufWV1cUrbiq0JmApZFcIsbFMy

eCMrK14K8tOaPlcwvQlYN1Jxf5pZChJiBQPebqELshnoj6PD1iBASTR4waNwnErSbVo+2ZgaAqyXkVi3

g6V9G7tiMX5IJ64Ibdy0IYJGj6BdGXe03s0k9PB+o+
EmwbOM6MjgMJUB3O46u4L0ntwbHuI0y14sSncoyvdFasijGfk/wH6DKmnqBYkgqlv0tTQgK3dJY3zuE7

CqgcMIbGRLyzebc8UvmiObrCKDzuD3xuoL60zfVaQsytm57zc9o33HfFRb7ImP/M4r4Mh3qxihQIhXcU

kONRTGIjpcSuDgqxdmDYlj8TTCwYDIxVSHTlVb4jrI
t8S4QGeooXPMf6c2zuF2u44oF9QAIu9IX1/mEYRpLdhqIzLfDnvWGboRp0ZkJMnDrF6MGUw0ViqsIQXH

36Mcznt+W9M7l5snWLnDtI5MCNSQjqM5x4FkTiejKwsbPu43K48TGiq4euwikl5JBT765lIvxBjfOukD

uF3gOTjrilQ+U0ov6ScTHLaxkVzKRVWe3/FWOvSEfP
7hGRV38cI5piWCgjt7JwO2k9Rgf0Pn95fDoXP9fpJQwdmAhbIgmWxAwBqhwxhkqJCsAoReVkKb1aI8Pt

EiCHr7oW53ic0kkW3poOn3IJzFGr02RvXZ5JDTpc8iBIZ4umhkb53J1q025rIKwwvuthUs4UoJVvC8X6

SWzHF8CgCqG9rbgwT2Ynrav3PWUbDTne49kq1F1mAI
pOhLnwo9So0XH+YvBrQ7AK384vPNZcWaOgvlDZ438Uhf940HFoNWBlueRDCOqp3Lo0IKAqYf+9DAz5xF

gSf1DQ3j7i+2ZW0ZzykYGTAZ2sCsAQpLA/jZXgtWVtN40DBTRw7udb3V0AsqRvDmt+VkXZr5bGg1Dkxv

dsAKGRK3SB6x0SwfNEUxBnBilhitt0XHsre9yQjR1Z
gBUB/4y2e9RW2UiBmsoXlYC9UdlZs8bJwzeUhbp+rjoc/GWD5tmWhpG88e4E24yr7hqVTk0qGjsppD/A

b6GNN+dQY4Db8E6z4S21xswSRJcHsnVYRZt634LZoPv3nSB5sy+O4KQ/NnsxJOMagx5He4ro6jjKZiF3

Eo11k1oHQsKBukhzLL57Gp2pNm6SiqH3A9fuvMM1WL
XV3eNfk/o0Uit4n/lQwJ4L64rDIXjD4v76nfpn/WxEk74O5hcuwxtvjvpaRToh9gjvwsQSeIPr6e2rXf

4p4ctpLKfwJqSW3UvFIA7UyI8SlM7wWNGDavhCSL9hnQps9/x7DSMU7FVeBNjeDe28iJOL4yGkQX4Nj5

jn707QCRa5xUbmMhBFI5VKjamZsigDPAGjx6Sjz+MU
P1WiqQzdgMz5cRcm8qBVLGdlD/FFbqhtUcvaDZCdQW61iFGz6eP4AtW7NNDzyCBD7kyuOdmklxi1EGLh

EPT4sUimr2sFSTskcPqTi2zKfdcMEV7H2zlCQmYSHSQqV6flaEGV6pogKARwUQXGDcOwJubXc9j520WX

/Bgz9jLfZSEvDqZbJ8MMM7KykY49Z2CBBrdg/2gNn7
0RkA3nuFG3wIVQwl+vvrw6Y0R3oNt93JPO+nSRPAdKVJTeLt+MEyOZ1HzT1X0ct6rtOHkV9Rg5lp9pSv

n7DhmfMYcS8EBRjTjYynNfaLSYiOOOTICPqDDbafFjROuc6YjjGxk117w6RepeJifC1SS7nz8whWxF6d

JIQPGUU7/aOQfG415jF9IKJjQ8tZs2LGuVbASsBaOv
Z+GoZxmHhB7K/TxL3Qlk8nhBItN3/uomiFTIMsxVZMm/eZbgwmcGZs103wtIbOroki0DAsT+KDboOMzo

a5IybOy1ZSNQ1yRP9NLhHwmqpZvjSU1o3c6VfNyO02o794kbV0zHqRTSfDLCpzb2l6uRBEinzNl5IQjq

NQh21mRcorfHMQxhyvT5Bq3aVHtijQjieflWCQ4NPW
N5icw8znKJyxUyZja4WpJmswjAGZnVkjPluu/Jq1GEeorkla9CuYzKzBdTfB4HvfqTMCs1TbLEEb/3co

0sl5sc/BsUFMUa6RM1SN4id0CJy0f4qzLjR5e2uWxU1ZacEmH5zRmvHiJR0juunp7fITm4AQ2hKrk+Mills

fjd+iCcbzIrnnHwUEx0VbNaKBqkjUMbxMj/s23w7wv
q/VyI9kKqU2s63HXhetP4udTkSSOsTTvVwg6/MkT73rghNux6JZLR3+XKHcBXdu1SFnNPO65CXjsCFy4

xq0RIVtCT4/yANx1xSLXZI7t75t0pT2/64bigingq1HK1l0xmqw2TgcH4dyZhyJbio0LruxUxjRFuLzV

HV+G1+FErBoPY0lxW7Bj0zr8JZjXuKTu43nqNODwKn
QDQIOxdCYBWFNZVBL1U9KqLmrdEmEQENPgMzSAjHOuKY94xrsTUJQclp780AjTqneXI2+AWEiASaB4BU

2LUjGqhJ6qPMYclaOqd8KlhGz3CQ2JGszZzGMypWLGQrXXR+JwLooRcyu334AeyRHJTMfMySm9/pbUyV

/6LqaGebMD7KWOhA2oyA2FSGRQVURxzgy19nnwzbeu
MYo0jqZ2FlJD14WuUuRE2nHxMKaoamaxGKn877Xa56LxhHwSN2+tHdhY9dvANHv2feKW2a5cUgES4lVa

edZWnxKP2LqnakWZFls2jATJ8AGE5C2yl02bBPrSWHMC0h+mhzsRBhS7jEzfcG7X2vkH9KG07uXMfc10

qSLEbZKXFlDTsaM4CVshlD1SyoaM/YdjAK7pZXQZyU
klEyDeLjVKqz4pt2w9uqs/MB7sMzGeIKYovLo4uz9p1/qRYhyS0SfLX4FvclnsgZOudhKGFwW3x6XxTH

TxD5gGht1cOToEu4g1qjQ5BlaEHs4Pa5BVNBSuS88uLxyfZ1+hH7z6L1EwDIk8rrF113mEK80WIWCA3v

fC6b7YUFwO/a9EmRPaJFSUGONHyf9pFCzuLuE+SaPu
loqAeGR8NEpeB8PbvQiVzD59eGDN8/0fz+aUqbaJt92Nvs1Ud2jW6ao+2Or7rQjtT+o/2YwtOM2Ahwn7

ckjbQ7A+EbjkhWXoEQbU2DIdi4LpcCiDAXQN0l/xvXbpzUJCLPvChw5LQLEQkoSV3jmeZr35qH7CvPw2

LP33R2TALxhVTDDCSBDQs8HtroNxMrkMhIiSZsrJ0g
bjk66yZJ+ruTgJ+H9jvwRFe0vQ187FQA4S4tF5Q3Pgu+B28uqAa1QWn0wv+kThi5G+dHfWWvi04sYNKf

SX7iaOfYAT+o5UNxcFZg/Zb2rQZoyQ4ysa6Mh3Koq8I8qUs1DBNGZc5KjySV359DjqmuW/J5oGAuT6xn

DiXEY0dc/tjOc/AVc7RwUwPHs+R6S0mFoguYiFg48H
7+ZSyyO6P8Eea8cdoia60LHRBpBf6MEyaaIdHcITIffdJkSZMHWDYLBzrQ0IRGpObB26UevPf4+zIviL

BR2yHh2Xg49qX2bxuMdiqqCLM2zRt/0s4PUNLYXL0PzeS5i9fmGsxhnACPHGHsW/9n1r+TZy3Q0+DSTl

MNrDKDQ8ssJmZSjdWjktZlikndGc00TiZ8yLfL+Uzf
CW+tDQPDghmjqBnhMn5qN8WPIczx8hMAOFCSt1nAuStd16dB59s66cxPsVaf6SP+gn9AxTH9xHGuK9JK

2Bzzs6+h4/cqMViQYG/QbFoEQFHV+bXKE9LNwco/YiK4j/RV08iLJXRZy2eqkNwgZPfW3RPHlMj5LQb6

Wgi8uvPspZ0JCZPzEajzNNteEJQ88lhORX+yL1cUCg
4a19wBO6SicT4MJ3w0POh05IRgNhEE8UCZsTm8ZUPdjnkGkzXA0pK5GCulBF6kXe+58OcjXmgqkzG2oy

GYuct1/Urky7KRvDStG5HWU/bb3cSNmRAn9jy1Y0c/5ep0V/4Dp28amXl79y1sSiPff/szj3LYxfnYe3

leOTl9DJQEHW7EAq91O+QYKKuCqCoGjcM5JqZzQ0pu
U25UM0Xke0w58XiUIblZt2DyrGVeT+ez0QGR6ZIcQlD8WxKYprA8a0yFX/zmVH9Tgbkg0CYGB5st+1wj

oFGjFPCGFPcVF7lOm12d6s4+PSDyRiFAn5SYuoFGgb6HFsDw4UmFjTcd69fX1FKbTUh2nWm/7umKOiEh

ExxhhPvjpr/AMdP3sueAdj9yYKXemtwFjRlKZ4W3Q/
nCKSTymIcUnl70Rz8CYTP7O+3Iht20CD1YsGXR+jpw/D6gNmmsEe0ww0Cx1jcbtskHlDKzv+WYVaffti

N28L8yKeCjPXS02/HuDRrrGo1Wrs51rLhhwhY0L1iICekkLZBh3C4byfIzZmjUVCNcjrjtLNSfyskmpz

e5VQ5ZAN2XCh9ZH6RrjKmxIDookJ1z9dueOFlImhcU
CIkXpaiNNTIWNyUn/ChIyoNXwc32pmGYx8wFdnDuwX6AuZNXjbMUsyypVUOjf/MrgMcCIds8CvCV8+p0

6jODgCeqrKlKku6Dapd0I3L4tooZZS0FwVQ+zc48tZHCyCUVubsK2R8/+YqfKuV52J5W3NdpiBGMaFW4

ZGNR8NA2+NFT/U9TuLlaWuZVMucZ9TTaiJZ2WlYe3P
8qEVTKWZPXpjpnQVvdXijVIZIwQJi+z27hgDH4G79jQQvo1PNiig9WFyeFY+0uPA/h6gV/hmWrhR5mUJ

CwrXTQHzNRg5Dp7C/pUydCls+sZ4Qxt8oeXS7nlrC5gxts5M1CFXSqC2ToiuO/ow1ON/8aVzZJI0vSR/

9YuXE5jBDDDJ1VeheMg2QfPeIHpy/KKtPULOsQmT8s
kkfA/V94nRQkR67PTZqfiVsRd4cxdkEDTIjHzwyCX+ztf6+kE1XXMjU2EEvI1SFGeEwh8nXE7ec2xJGZ

riX82Mz3U0GiJfYf2WBUXGvV8gc4qZQM8cU4jUMfTFHMV34qLvba2G5kkMLKWrNLCVE0lr7f5MMgvrMY

rVqn9kVUSM8b6fgwIaBBTLT1iUMb/Ct0iFjOwt6zAS
1F73jTT47oGPo3FoG4ngIjkGLgtsM0Auj+wJASBYW1OG9SBFEWpjr+RF37YDbq4V8dxvIBx7XRM7NjUN

ULSJxfFS03e062Ne1gV1qW291/8bj61vm0GX89tjOL3v/0LSkImQvd/FSNBmzQPsL5Y09d/dHxg6QfvV

z9uadGCkBsuNV5vLFpp5b3FDjJpdk6oK7h2qAOAe4l
mbzn3H4XrhV2L3Hjg99EY1ol5jjMdidzwlEWu9iWXwlpSj50GvD2on2yjCzLfNUd4md4vV0+O/y+oZOc

8MIvTpnz40srPAgdQGVDhqj3eqtoOunYuBM1Tk6MtXboYxB18j1O/lEDBuFfUpnoObYajk/IGpD6O+eb

YUQPpeA5JlSg6jhw6osgpFgDqOqsj6jo5OovWUF646
PKZWXokfGndSzTyIHmyLHIrxSQtThhvZWQdhYB5WZrspmYqjrNXZSpgEdca2cMUHm4GhCKcXOtLdJERu

jIs2CGS5cqJ8fsaXvHr7PKLCdKe6ZFCaiQFkwtbjf1GjiAOSiSsoDxFjX1XV2aVio1/rTzBPIXoUP1Ig

qNID7iDGcMFoZ/DwGleAIo7YaWFGp+diBQRAglJA94
3uDGpzeIyodT+xn/P5IAeJ/2BrI74Fcc9QVchMyY/dLwkIE8/jgkqlPZ7bUTbgJTdcgMtDr45oealeUt

UpMBM1MEf7WkQhOtxpzVIr7Kw82IMAF8vF275aUwB4Ie9BwhVJk82a3YaeZL514ao1Iot2xr642oGpfg

bxQdXhzNJHnfEZtynzq7GSpJ3bG1zIQ12QKrnpLaMX
VHOq+kCtj3mEgMUPeWIv+Wpa240N5uW8Z8Q86wMQHWlwKVuTt/YAKahjU0Z4I8/4EnxYLDYCNVX9rguq

IwhIVdxo5K7R6OgRiqOtbYC395LhS6RpCfqDBqC/erX60Uvv55Tex8Xw6PRCQQYBoSHfctm7s+ul1sJk

4MXegh7dQgpZYTzbE+olkyOEvnkWWy328a6BqSJgkk
tuoOMY9LK2i1bkcfobBM7eGYiQQlBtaeeymdvS4sdejhCKFcQ1/XuCZj23QSb2ekHHSnj8Jntf+vRvE/

09n6fupkboz5HnANUhv/Xem2rN1Gd8b99TRPIj/cK1Fqu7fB4Cy02YMK4sx3fxeY3xIUsElxEyF90paq

eckCgsuPgS+5LWsYf7nrpEL6qmyUX+jrB3ZwDkp37N
ciMCmO+JFW7uQKSfeQMdfVJ5r79dUeyLPlWlSzdKOSJAFz5SJqUDH9BOlUcZvEl+7CGSgVs+IXeEg5tP

MWo1Ai6fM7FP6IS1f+vuoOoOLWkCKgH0S1nY6Fw/bgOF06QRZwPlOpamF767yHlMSnl51lqHpv3gFsKD

/W4jNFA7Uvv0tshzkakNLZI7z6rqChulu2YLcBKc6t
8grXd5/zKeWQXwxYzYB+h8vEsE6tvvlfOrFkIKgMN/G/vbp8ibi3Tmdw5YMxQCLmrH1xCt5AyQ/M+eiX

L/8f+L/4hreabuS22WXmHrulDpw5qYdWtVss4uWQuvyZXJD+esvhRYwiqx5d6bWGIbYDTwhdAk9y+Yxj

ZPl083N3YpOB+SrVkAapmFuEZ04DSmcog8QiBXdWRp
x2McgBGbOkW6YSso9U0bT3xXyk1R+6/OCIqol6RL9AQDxlPjpwHXZbZZ4Vf83e9SjeRYv+F5BOcSjL8x

DEfXB0l4ti+okplZaF32rpk8TqrS2SpcN+v11CGkpdeGIOsIhqA5h80EIesZZne8FVN6fu0r3HU4o+j3

2NV5pQcRMcTL0XZoOfee8XlZRBwkqZwu3VJ+b/8P5S
YHprXMF3WuXUd9n/sw/DZ5o5TfkJnx3qd0ky+Mk+wQgrTcx9wSVw9lUCv/MpzVVcqU3md+zfMgZ5rarr

Zb/OkrOUoGN/tYoLBIpVM9rExokG2Vc7U5bhOb68zVaMCMhDO1bh665NY7L7FfKJcE1w5bFcYknGwx8J

ulGo8g7XLjfBINzhPjhGR9l2TQzunj+C3J2/vMPodr
EbYBjtVfnCfQ61wOHv1dOSRcqybuJTmWWEYWJZEo1wcNqRuNO3leMxsR6jbzOaNy305B1rcNnelIkJN9

0tBcyMW6iDYZP47a2aIsywN0YovS/03l1M//auPI0YyayHNmtaaT0OkO1KIJHVoFRWJLwqgGnLR5lfVG

T7Ka8In3w9Y6hIOFTBJ8jB3UPmpqRJ3ssZdn4BjAtW
2YjH+znbTZ0oCjwoiKztTrojOY5YybIdO/gpvDgbHep1NoFyeg6wKFR+wXKtc+gpdcjRaAgwl309dNnP

nv/95hxeNnhdE+srswywVbh44p0WGGvxqD47/n0GHmhOV2djeXdHQRpSpZTdvKqcZh8TKTXtlCrN2GBT

sheXC0fpP3io9WrjhF60zW9SVjPu+3Q9RPrnI3jAH3
2XSwAMZ29hejdt9iahXnI/SDopthjWFtcE7aFR2jZEp5B4eJ0cGZwRn9P4DeCBZVU3QPTOoEnd0LxQSy

AAqATgOh0AWBOseCgBG0rX5d7NxrB+v9504HlQyx6z/xPw4TiZX7oy/wNx3KOJxUTtkHUwerKrATip3l

/WfIJEHhFCGx30U1sWnDPK52/tW2iM9ZH4kLx7RQ83
Rossi+wT12QbaItTabXkJfDIeZGDnvEXxxtwObFArGpWVndZ511RzfM//qcueNz5/iUJymId2lbGQLgu7u

TiICpci5NPw0+bCZi4FP2+BK6UQArKmWFFzN9KCJa5GmgjuMnbr3oG3fa/gQ4n+3/lSmz2bUNg8PAK7b

ju9DCTXQvcwaNmR3mn+vW53Qhyt+jjXTVHb/5fGE/p
XKgFVf7vpg0rQt6rXpN/nj3MKQSJEGKZmIbvhMfAmgw+SLLtWLKcASEIFaab2A4JLVBerx2nvTvbrsau

zX3I4SY0VPhskSmsLI1/gV+nxDYnD1VLaSBWLZojhpba9yUHraAdWEw/NmZtSyclZq049zOqGhFMDmfj

kravkrMfb+T1FQOt60ZHkDJI+EcvBKRosRbbHxpnzm
OymblpPNmZDQPE+MmBOaTbr7c4uQGOy/0V5xnD1aYMdfT6hz/GwqtMC0BHDLftdwaxWPl2pvIFgvaii3

vYSgi16cE0AJEWtyznZn0uzAy7PAJTgi0ZoDa0wcgWmGrQbp1bz5+NiT1kRMONJqU/By9evSfcpM4Ew7

lHuW0c3GBJPvJOv2b8VT42kNawz2JV7WFiiFtW2xEu
ppXkhOlQMR6QdvxSQOz+eQ/wsA3XxRpn527teUYBjMbp1iB7B+Wnp0Dq8kKyKikIt9LmpFj1tfKbE+qz

f5ks/OFvxQnKDSZPrGmOkWvJ6vnIx5TJflTp0zo/am9kMgF7S7LDWnwsnFbpu2yEGGagdmT0ogyZtfZx

E4D2d46eJRGmXoaZ/Fw1/M94NGl3VkOpeERhXLGEzL
5o+9GyQ83iteSvGNTB1m8KxV9x/OtsLszAbryD2XEpc8I60c2ZsUqXwzU3d8txe6DW6LB1pa3GJvReiu

1VcSP6JkK9QL5Gx0P7lKqNkcq0ShbpMc2k1dxRtpUOi0H+sI4nX+HAwZ1kdhz3i3sE48PQ/Nf0Gc6zSS

7ZOvVPv4gNDJ9onYVjOhrnHeE06PcAmjFaoX1MVDc6
dLKezuEXEPYKTo3nPxczCrO/7qrWTKY8Y6f0CFaGCT180LhIh9AIy/G9oNA7dMmYSz/gHpszm4AsMl4r

4Vs5ptn5H7hqbv8aFcRrgMjlemlx/Rvh0o+WOIzTbtHiesA/4ilC7juQdPO/FZGjI7XbRjSvIxX1Fyqj

MOfniVeT/e50llYAzmyO0K/1n0e4XyqgErwkjJqoHu
IK0iBm/ZlcrttfW7+MbSujCeTYugKZIrruKBFndnwxdu+yVmmak1JCXOpHPNnfxcIyHooXHQmldL02JX

Rx/5PDFA2s1LFZT6kBQXh08sCaTK6iGoxrvN0+wlykbxZW8xpCMP0Y/lmXtpygAq3lttC2IaLeMib0Ne

OYNGMkoM7deY/R1OK+wLJXAzv6Qz6nPIA+5iZ76Mfk
mvDXS+qUigyVgTmuRhe0QYmej4top3jxeLgWF3NTJJwsQRzPNTQeDbkCyup4BBK+iFCBX+d/R2JDM7hl

bmjH7ezh6z68ILKTsLDs/Y6vvP0m+vvdGpjfgBQLLUoNdh2vzicOZyXYvxpT067aQ+OLAIb3xscSEawX

6XE9IwNYd3XHwcJ3MmB/gCnIg/DawOLgiiChfSj+ej
JKAF1mO9amKrpsaOeEiaDdCkugLanZEWUT0xmeGXiR54cXHNTPRLzwEp84wAVo5qmGqAy+tgEIwaPU7v

vCFEpaBloIpgNOXZC0Za0GM9qODCfDpwDUX/pVdROfD1IErHjel0M7rNvd5VtX3NMKSeR4t/NgPG4X0z

A1CGfSyoPqt0Nt1C+9H06wBbqUme7y5iOob8KokjuW
SF24Tn8qb5YX4oOiY70rGB+wTRgSqv5QUV+FT0JcY0waye5+lpLqwcjITsqb+otOdwvG4E7HHFYnOo2E

GlEQGCEDSn5sIPBo6zAwTHO6Q1SpPTOo6jhT7ujcWgm7ZddxJrkEa2WpdnTs6Nk0aoVL50Rs0ZRrO+qm

hgosr4Oxj0xhjLtyyGzkf0WoHc46SYXwOtqCBz3YQr
S/Fct4nRf39MvehLkWAegIryGJLIM320Taapueh7cYjpMkFlAYP/Tiyh0OEjvZ8pRrrveQorNqopHQCQ

371tdif+aa08knDQp1Ba78fd9PStz7skPDPdNexhFjjdw/OduEEzNBmfx5frC8jIMHRWJ3AVOUHrxz3m

AUTbQONDYGk6YhOhqzWtX2oWWBEq6gvaeaZfYsRf2f
RjDxhmmhN0p1kLCfkXoQHt3oYWoZbjXKalyn0S+xCN/b3u74cXogOgiKa8EJ2eAMBVq+VMaVi3++9deX

NISUqA3clktkvRFoXHCrN+Hr1dxuRz0byPHY+bT7HKM8cs7alvXCvqNuGwdG3Cvp7XwdMJXv0X5Smou/

GdzCMmjl6Jz0jiXxBsV3HKhpsyKB5UWFXfyIszgrnn
eleaO4VF1JHlg0SOrNauyvAHFqw5r9z4AWqJF5CD2n/Kvmq1FvjJoNKdN+v/9JwgqNuKwXwwBWSRDyxO

H/iEzk9faR4A5QEr4PhO1ZB9umwUDXlyvinkNXEc1CORCo3iOAnWo/j/NO5U8sIMPrfrHN+qRH0PPB3A

unbfyZc4kf8IbRng9LU9UP/4zEN/tzUqtM6xxqLfK1
qpyPKLtuOmipn1d7kC6WiF2YOyFMvsMMjKQDth00yHujIpNbS9/GySFtpQnD0eFzwDIILI6gAj5zG4w2

cI3sG1gitE7Fx/b1VcPXizTlQma6110pB0hMTiky7fEQqLUqrpENGN0HzyGoK4r0Tl6BQVdJwY2oioGA

RIMzKfNrmGk8sRn37bf3ey7MboPL5lGDuiK6lm2ZB9
lPqblaYHNd7Lg+dGjgruaMdIuyTfQ9hOjpUUmFQ0vd6wIKh6YM1GqwZcEwkWzKO+nMMydKaTaSKgDXUf

WPnhWZHybqDp5/iPlHuizwmqgPvzlEO/vqaieyBg4Mb+WfPNZ/9kA43GiPxShEs2Q8g/5PzQg+6FE+Du

z6Rxxp6yurF3ebToJ1t5TToCibiaPftb6O48XEEzSo
K9HqdGugyMuSLBXdqgqod2fer605g6Jo+08LhXY7OM5X2AI3vbmu9RbnMJh5JXkwzkATOvwzaE7Oc51x

TS02Qh62P8/8NQJjshFV43HPQ+haGrrJzzuPye8Nt+QEBLf2Mmb/vG4rn6KHgY2MOCrAoNOXMk9hlqDZ

q70DqH2xk/0sNUa4snzCpYlBWLK9qpLsUmtpEkvhpZ
/JSMSsfRvw3bJX7+Fp9wt5A0W2r0CTw/NtZahBQU9PiosuH2f+YGNy5bIbZkxwNboPZto/8Q6Hzf9i31

VMGZp56JNKKzDL1SoUUTBXAZXY5dO1bV1U+GcQlPtVNkUnh/5svXvbTQ4iZ/8Zb3ztfVBbAmi4PEUTZQ

LD46R7vnuRm+QYd19xkqFxeTjpo6BBzanGVHJDAe8T
2TXzAzGSUsRl643oL34uaM7nsZtLXyQwqq0wGQd9RsUyBiwaCsA+AFKm6I46rMgBYKOo1qtIBXuy0sgx

osDwUfrxB60FgKz3qa3DAZhxrTzCqKBBxJnf3FFgIhbFWK+y0n2ioWkm6Qip9Csf765MWFEz+j2N9L5F

lPt5+6UmyDpEVAme2y8hbUMY3IbbrV9mB+UQkdNd3h
ohq4+z8UiPv17X4uhaWlnkJIv56qgyJCCCzt6vW+JnhSizVneFbXrKM/X0P/dtDuHyy1xw6LrQT2qlN3

HZFcjaMuvpTLZgrIivWGz/zXU30AwoPjhcMX/t+ey7MaGs9QIZ//fHg/Jj6xG6BVYsS7gPTpiDawSvAn

8W/e8vMaKbN+QJoWh6rrvxSBootE2h9kPRNpLHAw4N
uNCrPDiYHybwsoSlug/sppeE29lqN/lESWwcQYL3j/tMB34hP1rVxiuHE1ofvaOTJhqHr1fYSwbhYYrT

caK5Ey6wsrch/t/y9X7KU+vDX8iGmphS/cqQiqNdOXKWRzjYQlJw8WBosznst/EdeRglfHzoemTIyIK1

gmgElXm96tjtsJg1h6YEoCWbRqfSbC7Sgxr5imFGW3
c8KmFzhqMOXr5/N+kvy3LgLsEluJPqWbAbMBLxSAMyr0uAcyrMuiUKGhtvvChE/8VeIaafSVlUOl0+uP

mdxGi1ZTV8zIuZQsF6aT+3xk03oAE2mJJTzb3K6MoKsAh7YK2VW8W2WhxFbO9g1U3rDnPrYo+hRrEO2F

6ZebZw80AUVxDMmb6serKtmRmHo5yYZPn1SUf5M4/j
/IBP0EJBftussHRYFJrLXVJnuCo5XcnST79488lktE95/Tj+8uDF0GHoJX6aAhYLPjieocAwr1zrfY+0

JG0v4XRgTsfsJ4l7sYfpSuMm2vqXLWw22cFPV9CfMjmz/bq/B1lozAepKq6EhqrPsphEqKeVRRT9tGUg

tBJsP5WNoq/ebt1G45NQdo13+o0rkr6Jbm0x31G52a
ibEIHTtjidYMdillCNfxT+Tsv62zbfDizq4LW+fY0MW/dFjZxQT7B1Vlls5a2vlUXdaqFq6KhOouYz3m

BrqF191K7S3DeyFKJNO6/5VpYr2cLYo7k1t2zHmUl2SSfDkc38QmY6/nAjt59RR32iYcfGIuwO/8xYQB

0+8NLmTH8slDhUM6FYWw6iTMnApu/4MtTy+E56Ff0P
WHzct3sIUPfnnYne6j1ca68pgJokRffTIGjbvi96fUjxbojKiP5Bgkm6eg2sA38cXiGZ2qS9hawuFD7v

qCvi6m5zzAjtrWVp22m1d0FwtSgzbuhY/fYiQqmNG9g4ZLK47G5ALCXzpS615f2+qqlDTX17OSD07Pqo

WyDFjy6s+fRwqq/wke6LL+8PMgRdwvLg7rf+y556eN
wkYyAHk0iQ/fhOpeOIl7s91IgxrT8/Tsy6hVnFjVbJmmmLmydiuUsQC/aac/hYHNDlE8oQzFNb/35gxg

zWmXXBpBWV+kWhTwiXqSe6sdcL9fvFN1D9JXw+EKoL5PFFxGuRPMIAVg54SMpJ5N1ZC8qdM8dNpa4yd4

C8WmW1Sz9/uX7eVPQp7Y5BZHMb9Q2QWLE2AFXPHPWW
IVkPMIpfUldkYctt8rWfV+mCSXtNhiZ7QdyJTB0/nHOdr+/7YX9f+2egvV11ar5709jX5dcprlrJZ6Gx

MP28mNu4u2JEFEDyku8W+xtpn9xhd5UZsVDgufGVJ4YgNxb/IGRuvImmk/t27YzIstpKAQnwK4d/qvJX

VUr7oaQ8roh4ozjDGWsS4a4rZk4Kn07mZQpHgyxqu5
qeI1VbOQwKLlmhoCFYKmv4dfk0vCLkttVqMwBfc5v5JpoP1DWwJaYm8RHwoycrZ1Ft2vxmSXzjd074+K

U5PSGFqpa/4Qgr2Dcs09gRNGoBbhvwCnS3b++X3MTmFVj40M2Ws2vl4fVJnh9Pdc6pl4G1u2aotxUMRV

P2/36Qaa8efLwNcSAV3jPRW2WXD8UEs+zWHH0Jcj4i
anuyEoa3x+tBq7Xf+q0U6/TYRFH3IBZzLACeCXEwHb/r6p/hD07RS7cOUcsRGIjynoiz5a0Cpst+WW1D

lFHQ9SIX76dZtvp7h370xLU7nmdLG8/5KrKCV87JwZciou188WJNJMIl4IrKPS3cwvI4BjWrZCBZJ9Di

Clq36wlhED/TzZ60tibgu3M8gN9LFzLuICN7jSTPUN
D9PCe2lh1hCVmpiDsM8NC29T2xxSqGtLzYlDWKN8VFoRNA0kMbWnRr7oVRQ/13CItvDmeOU8Pmv2ZzGo

g9VDXwfpqPmBck2CeFQQUfQchNTuNiLlSryzM5NSy2Meyv9v0wt6Mtf8NY3HqLXC48zTdJG4mJF/rC6S

X83ti3b/eFJtizXSzFR9KUAnoyrshSglIQl7Xbzhrh
FgG6bveAx2aswmpYqTJjPum20zdAI3uiWJKiCuzdcURyYR+xBPN3bj3Db8USpQiyWGt0Ox7n8WUZJxQ5

rZJavEZ1Aw83jbI75wzVdpQUTEWoTkvCzKy0pCrNogxszvmG7MxuhOFAVv6eLf197C3BYHja114ScEMk

1OIJbTcUcvyHS2f6lQ8r71otfwoEsybXPKWA32+wp0
YLTbNySrj1IEqrJWeljWzNcEeVGWukbuY+9aIIh730ycWFA5mdZpNwBS1MRlRD8J35B9xgEBUsouzO+D

zMk8Rrli6Zcqvxtm+q/00P+GMiO2e9VOFlVJCwTkYSswf2WqloM0oZhz0DZt7EgNotb0wdpJqTGcqyx2

mzMXhHsNI60v0ISWjKKSbmtYqyoS+FRDc5WjKVhckl
wD2mEJFm5pjMp9Gamt8+EpbVIq/bFq+WaF+D88ckSPTgqEjo6BOtZj+eKG7kUGfDzJnV538ZovofQspn

vR6b7QLp9kaJktdT7aIRBd0PW7ZkQ7Mpln/EmeNmdoWWr+4HN8La8ulrUaE+eHCYvxCNgsWXhez8cbeF

RxmdQewtvnwMrXqdYxXzQkL+HV1u2fjl/nEnVoGaUp
BmVy2NQatSFaAwjo3ZbrTCWdP/bIrsbIlfEvUrmIU2EoE4LUdD/HGw/ykDvfK1hDlQFOsbk/w39xZWpo

aRvwkBJCtMBGXFMlmB2gpIl7dieAvmmMdaJwbdd4RHWjIMvUSgJqzkAPx3VBiLDhMQZZcGuobS3NfJAW

Cfsovttr9I3axHBiDufqXR7dJTMrczxUgPa3X2uODe
WiYBrkEz8a5+3x/vYgorbmrHO44zyBRqAOwXYrKTbsJ8sR/AFHYXC3LBNr4l8tSyVrgbLp7oMuTscGJ6

rhxm1ZT05vs9frId56kskXgxGs7O3l+qmttVNz+qHY/RfX9hRwpiQZfwHS82meez/gFI/CoBWEPLnMHt

HN4HXTVCxUZx8JJYZAt531qdwJE5sHEkZlNk6djij7
kNBQ+FcjLpid1Nc/qH4Eg0NtgOjYakTKa62wICklpd2suzD5LkqsrPagMl8L/GIbPG8zdPsonBp+xAxA

sPnGeYtfZpTeNIXBl3n3xNYLPEodH6kJWU9MGiSDCAcGisia5qTm7JXuzlN4nRmgYL9HHHU1WtiwvMmt

3Wb2/Hme7qDYf37op9iboZMuHxAVvMZ/dL/KJ/PfY4
OrlwGDLRI/ZKk16S6VkJV0fg0fYGtsmPNfOvatQDRv6H6HpZDezU2hUS0H7dOaTY9/ZjA/pGxOLqi5+1

xJ1/+niSoLL+BILNAzmuAqYglVV4ppxrHRIf7I3SGCJALRcJTS5jW7w3ynnwjG6314t0b3EA9ihttxwF

hvT854Ly3l1+y3UkLpIxmU2GHeOG+rTqM/pkVkiX9j
x1VOZ/stBg10u2IFPe1zy38+1JyzdvaRTZNjnLeij+GOKvc+9a6teyA2n0HuPaEQtdv4yGZeLnpOvEfb

b+fwaca86iF9LZS6gznG7LA2BMkWwxLxlV+vK01ZEA/FoOOizmMg95nMQfauSIIbXVDG/zt49cP7RuEq

+X2jTPacaXGW4poPRpEoNCerpdx9PjfwCwhBwkyXzI
3/ztbMRh5S5VvBhYvDPaIQ5rxb2QzLlC/8l00mQ8urgcPQAgTbaSUJF8qrWM+zobdLlS0ewxCJFKuCzp

O4ixdMXAtqyICKCBGhYbLjmvIOkNJejal+j5P4z+K3NtzRA7fBs05SptyUgX9azX5HCbp3ncIB8jWmVs

8wxFK3Xk/8HDvu7yUqRaIgnOnLb58Rl+HwQ7PrWnrS
Y7HHOVUDMMiJT53a3vIrrm+GNfMyLFSbWteR0wECouyPVoency3+CGkdEa2+tQxtojlq40xEixYvbdsv

iOfUmSh2H/EjyIRPQk5xh1sJxGoUeNK2mCLrzc4uffj51TGWPLnVyzk4fX64hVQ0LuSbBgaHjFDoGcOw

2l4rhze9UcK1dejH9e3fVOi3GQrgE9itGwYHYNTjg8
N/azqboFqRY+6TinNXRpsC9ovfHR3Xjx3knkqb2vt6mZ37fAo9THs+68zpJAOBv6F3QN8jb18y1Qi3DO

QU8xrLvEjpZCgW0VyiUFYf2X2uzwYdr/zDc+43CQbL5wdfGlW6XVLBb7gaC4GWI5sIIfUdvtqVXfX2cV

zXNHEuwnvyfm/ugC7P1JIqzCZJlDTNxt3yZrEVblgP
kup3v9UK/NicSmPWnod1xMsTgOq3BJZKyaKqsgRJcO5g+NdfQjv+idfmxDoRWG2Qm8+CZ4XGzrfTaEFd

Drw8ZOs2Y03kqj4DbFs6/BXeNaRRdd4qMgaNamOi7P8Jb389Qf6FXRSYf0RfWuqmHx30Pg04Q0HsU+J/

OwfVMMztIgeu/m9nEjuNS1Xd3eL0syktN49tr/qDa6
X8immlW4EMQf/hZXuJglp2ay9xVbTvmFQRaleNM5bS4+hK0dwN44UUDLuJYjiN/kYWcy2xeBQpE2q2PX

FwPXKNf7kPO2bvReFc+iILxluszv2WRUT0cXewtGFz9MS1ngkIz0RFVQWo6B3ZN/7pLt0RTea2UNVm3y

SySqzD644Z3osjHjuJ6dDyP77RRXev+Zf0LIIUCrjd
o43nL5hLnELtfEI+dJSFkIwlufnrWTzyg8UQQb4xEyCPh1uc1yBreDGv++mDIICWS9fY+BEvpS1IWKN7

pbblUsBhvTwsqaHyg7PBujotPsdsN76uEzWh5bV8JlHgRI3/Xs49WkqqtYWvp3Yv58sIjfwcNoufrhDL

NCjWLZ8ZueDxsgP0sWX870n394dbom858q9Zc0DSUX
ZRVN3WBcKuywkHqhsgoGCQDB+9eoBE9c2RxWI+TXV9Edyk3lPra31K5s54UBQe6k2gP6McTj+keYHL3+

eHeOS59io+oajhGtaC0EZFu53EUvw4ZBDnoy6lQpdXBjxlcsxbleoCuHto9dzM3FiNn49CITkGAaWbCq

1tHRXSC4ezp93Qw+EwCO6GNnmnL6iej4B4h199hjls
KJqpJtpnsWfYH2X+SuApVou+MSjBc1a59/+8K2qn9fVvpC+PqM3NKfnwGpZ6eu0LdG+9aCbWc68ivCpr

vjbXcBxdVG2DUSfbiY3sarqSxKRP9mT2bByYl78mgFXnC+bzKAHcxU6yqWHYlyy6idPe3+B+hM+b7zFb

PG+sTvYVaM8uVkZ+w5yQ8D6C89bxtTqb9Ld0rlvuM2
HWRQaJLvfQbyoad6duvDlrYNmDJWwl9Dyyvq17iUIwA7ntv+te3rn7vqSQbHsZkKEYl67BM1xKR9SYgb

2bO/w4lDR7l2wDFYDBbKm5qROfpvLJyRmR+gKjIirY1ZH6gA3zH3v1/74SvOgKV/qxBjBxQ6TyQ4E0bE

trsV4v16+jn0Oav/pz0KpDFyp07UvtP/kcSuYM4IX2
0nbYibSAx0Uli/h3JRpe4+TkmX2I1DNzX3M0G/UiNqDtNgv9NBa5cj9ZlcAuEYVnckWcvl4iBOFIXbyV

iu3D0no1JbmqoAWd6z34xUiSpwqE1fObzyCarTCptI1xBF5KQuu8e0xHuv4F/Sflvww0gEZA4o0Xv2Ld

9qLV2xRsYCd3EcXmsmRu77NTegISskXAcC9DU4zVRt
t7kciBoqO6oje1rL3Ra39Fn7u1InIhHVqPXfhSGv5j9WevJf0i4Srvr41WawTc5dQ9jboLyvjPmguG5v

7LZRfCnQT2SqODsqrfnhp7tjCJMTpcXiveQ6PhziY3WfLa1Aqi7CSsbMNOlxZz8XZWkqQtWurzdDElCS

d/AXbDFUcHsTYMWH0of1fSLXLm2v/QB+bKsq3ItoEB
w4VD0voL+RDQyBu7w9ltmX1AweNJcRjnpqyXOed+aXVi6WFz2mRFhkAUK8His+zb3egSAj0V50/Y+p/m

31xnudxw+FgnPO+l7mvvSFDCON9NpRVhDk7gmnDrt2pjy9JN7cDm//BkdJVqj1X92OxmjyuR5nvGxlR2

8bTwOSsDujdDuQ4L+zpMchhksYjJbeTgGg52exhNO+
gYV49F28VZ1cD6HMAI0gZYs2LNp587tldV5VR92easVRIvyidN42v7mVHXnlkI+PL/WIBobAJBH8BnWu

IBtYrn8ALI3ffS3pxxTqjag6Mbvkw9wtYvCDbL1P5tTII2UbSY+x0DDWtwSE7Zzh7nOkEFdv/scnJYaJ

YOwK7Sf7Jyu3mjT1YKyGsWerrOYREreXX/M1CElxCa
lPp6tK/AdT4FdOGwvnY63QTSy4B0rfNmXcWG8i04mCJv9OD49fqWBlkctvvM/NJgEFFrVAmTCJJHYIx8

tV7vkai0+oXQQuwv39Qwov4d9uUhBeccSkzO9Kd2R5KxCEQkYBuOt8Q2r85rE8qQt1VXbL+SarX+/xnw

1OjHihYn2xZgdyoChEsWPndq/89mi+80gbS4fx+KZL
W+2x0FiTvuZmcSnIQe+Trz3uJhLyIr7OgT3GsAqkzn69c1k/qbm2jtviqSCZcRWR8lC8dB0rqUJk6zf1

MPMSifWlPe/8RCCDJ+jiuQhJ6Tvb37IG2w5sJEcDMsd8kCqtaBYZgfu83BfbbjdO0RwN0qCKkjDAqdGN

g7+FxO5JHTxbb9dFXcWSQGoLieo8BH81RPFHkj03CP
FqoKkt6Odqqsq8suiOsEI27EHoLW+s+4Udwk0zCtl54IxWI19kEaxzL7Vuh6NH8TQC2WAd1sseud/jXT

cf84gn+qPVLOy15M5jbEVWdB5vR3M9SOWbFPTG4CqNYpuSeldyoXE6aZ/YNObvGlL97hseOO7ZgJ2OXR

E1SsfJpEJU+fvMCmr//FgVPNhI+oa/qBXtOB7WGjkk
Xt49FqVUZCeQgSkhpCgHwggUnKsMm/uxvoY4t74WjMRceJQuW440I1Ozwfc83a9x2Txh+TV0jXbzyNtU

MkzwKBJBpIUTSn1lyuMTKNE6GiPQGuBbaln+2j4Zwy75oXp/FVFxrd547OigkRlm+JBwgmHBJm4JkrRz

Bp6biQV6hbVhbh4+KFh8rfcsYaS+64F9DM4dWXkF67
PrA4opnHSGXhItR+c3v+hstDeidhTbGq7zDFX74ccXXh4j8uZUEEZrfjPLgTv96cTkepikshfTwSp/WG

QVaGhFcjDe7JUiC0dlMWg33t8rTYMWSed3tQgI4KUe/Ko5tJN/D/l6cvISpj3cBlVrsuFC+Xl82d/OoC

D6IeSwfxmXiQpRSOoBNNpUESpdj65s/nWDGceZzceG
u2YbSMQbGDH8EM0KCvY+A10xaLFvjCxnac7n5uMfmuUtAYjYfGqs6+6KovpsyaFNkW/QqTsDoO43DOGZ

LcVHcGULjunul4LwBY67ALz0j5kXPjvj0w9nic74luivuqlGQVSkMuZuJA3blAXa2dRKYsyE9G9Pv0Zs

rkzamN+iZTDRz12QnCU9RpO2l4I6xr/iR6R3mubUWM
bqYlxilgkNg6bK40J7mcAWbOkrt+7tgbTRrDb87W7qlW/kfm/qpP17FSqF1jS/hPsMwFQXr2dgexYvNz

lUN9KfVtSnzS2AbJafSmukGcw4iWlRyRHb7Sh2CY/A5u3iKlvVET1mGZb5NuUMm8zNoIzRnkd1fHCEG5

3OBQHrYVc0UwWqDKq1E9t76TNAB3TI5gkkTzL3lOPS
QhyEfL0clNgWrHO6ccGQvNtQqAoFeF01qcqZ72FTzvJkHBZkwglkEdykxskm76Kwh7qvvMW8aoeMpTqA

ZK0oVa/kyeIX5kD0XrSZAoKxL9AvLxJhjCiiOKkDKO2lz7ctoPkx83q/NFwOzbFPw9Txr0gRY/N73m6D

EenWXGErb4J0TJ5RY8mvYdELiPTCV0LUC89WLzTH8w
UVKuLhEkTNRGUQltsZXhnV1kuabGLpclVqFrlKIMonlCPK74Dh6+SqL5NBNgLlGDJRVxD+FnJWsoEp6g

xoUyS6cIMTCR/exv4JzQpCAgyv3Fi5SnawYhrCEAScQBRtJoIdNtdPne3ZhNoF4qoIgkW3y+VktIJBlF

bz6unOl2Y/4HEFtBE222lIlvhUeDHqcOyydE9SFM48
ht7K/4Y90Dm5cDNxLkkJtJFed6jPMb6vqy0py+rmt75vMJLCOe4twGyzXeF0xydsD8o+dsn5Lom5PR50

YfQqiRniT+STpgiqesllgIOh/wFoP5y5QKNEqMxtbb5INF31RzHYcBUzRzwTZfVnapbRM+Z7V7aEOb/U

mvNmLd7mn5QVtzsTmBCJRjDv9V04FCEQDFQl5dWg26
GNbPDaXLw3Oal0zeOaAMz0CXzDdaWLJQveOCBScjpfnPTosG5RJYJl9quxMNWAZocurMZh7x2m3GD86z

hJGE6gKTpeW6LNQsF6bcfJGa1Lmsvqs+5JCW1tPU0PSA3fsJk2/4m21u0ZNRAkRfqvf9PvNwZgKojyQF

Iem3Gc62Juqe3P7ZvbYrC4Gvik6bV+n9DeCz8kZUaK
tb4PAIgpDMM4bkxZ9DTu0hdCA6uoblNth70Vy2vij+ojCkfYs/WbY6mcFg16VKQkN/jhDE0GBS5I+vK4

P70CVNQnESfK35rc720yb65b/uoBkhRzh9YnuY8I15HqdAgXcl2UjfF+uUqkc/bphaW8FRbarHDKEtzd

YSCyKlox45x5xw/+T3ahqTTVFdvXHUQRXI6cMRVWRk
ciqOK2ME8w3RdriG/ZhSNpHRAapBFA7FwTjwM6e1TTQ3xL/Mk8J9kgAott36RJMzW09/yl3kolzcaG0s

ACS5Q1o5Xf2Z0I09CCVVMwkZvpOD47fYLTXviU0tO9e2mZ3ajp+RHThbrI5TdwYo/esKxyhTZH7b46ph

b5zS/DLO3za49I0ChA0bGfNf5CYYNgkWYIm54fcUJx
qYXN5+M+G//Gj5pT60WOwwHb1zuWOeI2tnYMJZV0bfS8ZX1wSGwY2dzhqhfcpGSx/J3ltmDW83ze8MLz

P5mbjWJcmsUcgMqSvyVQc07rKtbMd0hmUM1gqYifYgoJckhZqknxU+1Rd8YFFS59UGYkgPTTskTaN8qz

cf3G8Dtj60EZeCdJYa2zvelbTRtF5p0bCXAhmYNcN6
dSOW2iQDuLdNb96Fu7xqco9Jl5avZeKIkLRA2SWjWsuLB5ih1PNHtqIyGiHKIudKpLMjZQ4/ZbLIY723

emBuqhf/V2gQdxSfgZNI7Ez/tc5PwmcO3NX48oGixIi4IuXFvvl9lF0/RUYyhD0LfCpEHT02zYYC2dfT

ZkJ7BhjcJelstNJuD33559cBQ3sSxwNoaDgby9BG00
Y+Ymk+Y/94pWNXF0NJJCm9l70UOQYAIN9foTXAuF/CoeHs6lM1GrIjmBBqevWp9Uj7Kmq8v7SAXnloSB

3VNr5cVQ8YjYyGPaK3ArOfGnz0CRBP4K4Gb2LhNI8tdHO9NhRvpWNvlL4RAF06ZYB6tk4fYbRfZIAk1J

m81N1EiviDmyPOXms5A71BU4rBrg5uAE0grCCmPZd+
atPb483AWgT3Z6+1jok9/Qiab2We6batag4AvxXocReVjqJ4O3aftblC9gBw4Nju/KRXwSLotQ+19j3Q

hMDEdhP6UaCDtpCxWcttta2HTNF5qcsXgjsrhO2shhRo6jpttho4tER4L2zq9rjRXKEicBp2nkBJo+Wj

GVsP85dXfjsmxBNj/LLMw03jGJ/zBqHzVcqeoE5yHU
rM5oXaoFT6+tO4bl44jPbtji4M11Ir7IniWH9n3neZ6Ppicoosy96z2/Qi70WXAJ+KMiLs0FzlpEvpb7

lXEXAg2/JA1MM79GLA3qklwB8rqqB72ZynT+Pu3tSx5qLkWbDr3lcAnSnpr+7i1NiK3SFaUZrovTNeKa

a3pvljQ51LuC5XPJ3cFbe4mUa/oyBwNPC5/EMUVULt
fSfbEGQ5b+4VLf+FNzAH/tmGFf3hc58Pq7QVoRb6QcGSlhrp0opy8XZot9uQPCgHeXhIo8qZLvhpRH68

Z5aV4Vf4ipIN/FwUt+BCdlghJp17AR89W5kpPjgbo0g7hta4U1gl/A7WnCEU3rZfNJOZNDvoYNLoFhJ0

FZvILUcGEFqs0qcpJgBCt0MFzG49nNojDoy1ziXiM/
WatumGzrYSFDJy25BFR1ghhQLPU/rQMwlIp9tGWqdgfHpCO5K45Ri0IR7ib8g0gSOYHsfRWb0fESslGH

OBcLXsl7geaeUroJNwS+W8dHH+PJfufPP4qR9hkLodj/0lHk+MtxbMW3K8Fknw5L1nTxzJm0wpfX3pvA

/7ycs0WNiZH7KiWdUjObjpaeerjxP3e68IF3fjbv2z
kYagAPSrjkX7K35oQis1Q7eGP5jVVdPlk2YLW8gj1tCUN/lXKP9iwgC9pCTJnKlnS34vdOL1HqL5FVac

tljKIOUZONImscno8aRfdyHrLYE5ar11+5CxjK0Ez3CzZoxP4KMRd00sS5TebDv2mZxW1NBzAANhm1UT

kXbMsMPTnHvS13K1y7nElUkbCVclw1GD0W7sdn8ZpW
O1oy2hTukVkRsDsgwVSvCBInBghEMxeKda0WX6rNnWfqZZtjUpF6Gq7cE0vxfINr/Jf8enpaub6uByBO

T1uZiwBbPZ+jiWK3Ubn13NZl7UN/6RU7oBnMvNx9kj2QRgm5gl/m47vJG22bddGhdVn3CDKp9LsResdO

t/HM8LlhHnIfkrgCpUk5Ag9LVUFUgBQRyE1NL948QV
zBuZOlOBocMX3aBBgMImyL/QWZPNTGK5NAxDXzHUEteLsWdvzOi06AqarG6rqh35ln+IHvAkn90KoG6J

ppliwCtJaEsIG1ZUKtK9hywn6h6MZcza5s8bIw5Ke/7+4AthF5+e0qUIcW/nWJOQ4rEwEHh6bFG7qlQz

0gies4DloFnoY+PIBMVgHlGOZhHNZIPXC+hPlRznUp
4P8Lp+AxUUww29sibMtKYvyl6WVkdZelxXOdJlAxMZ2Njihcw1bTT7iN11CWz/SxGxudl2plNTiibcRo

OiZGZaoCB8vok8i9NGb1g7H0PQWwcRka9FZ8vG+2032nLsQ46Q3XarnUtzTsA11+nJXeiAbZZL44ORrY

sb8juG4ZMQH0ZYnwYfmg0PfzNR1/EK5C3bRr6M8+Xa
XCqPO/ruivPkUqu3UbmEsWOIx+LmtiVX8YkX+0JozitVB/1B/olPnlrwp+prabha/qPvHHa+jvUHH4C80Yl

8FsrKUnW2LiPO5L7PFTD5yPtbGM8qw+G58450EUuqdIXu3xZSXku73MG0+GKW85CZSuRMkdEWs8ZFOOp

mt6gPr6090N9D5DrIjDtFOAMBi0h8OCKjzJwaJr1vN
DmzjyiX49241GMN6aKjS6nm+/C6gvP/z9QdLXQvQEUvcgEn9ICL+T65XCkMFcKvKURvZGVkB/+256CqB

doSwQf1PsW00ulrOeX4F1TaGPw987OnhnJ7SvUUHa0Xy6RSeyrnutOk/QbmiVCbASdagZSwk7RHg7buI

wmQw2wMxwDxGQT7qNxKLWMC4M6BBG+pSEQRWP0NeVX
CLdToV3XxXz3qBWMqv479N3REK5kWFbwU/xU1vscbvqjEElCvkAQlduwgeGmkVPYbGoivhi+M4hoWGfk

4cdHJqlx1mWf5dpRwrAT2ifJ85jM+7Zyn/ZCuFBVQkaznRBNLDKCys0BD5V8glz/GvfBjK6tcnGQIVn1

KocWyQCyvb6PrII1735zuxQYKaZ8WRA8f8oL/51lmr
RzTmTSDVl9rzU2iz7Jzxalu4HhmInioYXzL4+Snkobsr0Pi04dFC4Qp5XYAOdnqhxCbEXjNE5Jn7IhME

CDJDoOcizpyL9/YJsjhivWK8Csb7KTLHUohOgpBm9mLLBDVj0s7HGXpk1arF7wie6iH5Tle3Ii+HN2fj

m5NJv0KH9jmiXH3xfCHpW0CF37YgbA1TA2NRszsgXt
iHc4UOI5NyA6U1Tf06wW1d4sh5xJF6qd/J0mVBnx0VU/N12KX8x/NBmkV47v87m4xt1qTPTu7+bW1xYY

ulKwgBBEoCtpMzjm/w3wSnYHglotjYli7js3qkpBYufVolZa3KrirD1bvR03GxcwHDgQ4bMpDpsmIHNJ

auKbGdrOAvB5VDN7ZoBYun2LwED8fKEkZJDSEIF3VV
7N9Q3HUCUPbcx6I05rcgM7X8AtrhApyxrrzndC2cS8jcUzFlS3iRV+Gm3mLK4mE8AL9MTxaOysW6bZzw

WnY3da1ouSAcZewETEELx/+7m2IDxbzE8Reku0Z+yUYztER6NkwF/DHWAjqC9hJmnHw3XHi0BvF/csGN

rl8OCR3/A3eMhf1FIseVHdbZmRdD+Le+mPwYMlM37a
cHZnVwqe08a7tuvPp3BodfdNGi+x+/7YqVc4HbolISmeWQe/39Vh4JDJfOAuyDx6EPZt/taxrDB87yQ2

lBCg812378L6n8kbpuTVlNp1GTP97LG79E9Dus+j/G86MRzUAUxy7soWmeoC/M688FvsZ6wHnT9dwqa+

HBMB0h2qJcER0hTU4iZqhqFjUm1NzN+/XFAKKeKluK
3AzIetBeie6jPU9yYcMt7uT2T2xUhEU+y8wBj6DQgZC6wdRu4zOJou5652Q4bUPEsahXgRpuJveznLjP

p4RzwXDA5VUR6dFDfHj8eXKAxgpf7hzP1EQO07IoyzhEQkJ37YXXft1wA5vsJ00rsXY1rDQrgpbZfx3n

FWrFb3XkSoA/Xvhym7vG/cZdoWDR2yBHMg7IKNcbVW
fd/sRqul0ytbBIqVkIXjJ/TdvAQeme6pQwx8HmNaL68xilOh1PPKPMWrtsM9q3A+TCJk3YcPXAen+okc

kllSXh+URYQHz3ywerbP93v5P7BVwwYYF60RvUQvsxyzj3YVvvgsyrglKdOaEizfjGS/uMVJHbS5+lcB

NZA1ffytzY32Z1dhfCCFxF7aqu6sZUHyYA3z9sa2e+
lPG+pgLk3NqarbIc6A+wsldIAt9GjAUWFdK9SkBNzVCvRh/CTHK+FWBK4sB7dn2XRjbxIUCTS5pO29zW

tr7Qy2rMyoJaljo2zgXmQp6fZQCmGnZP5+OF8kcWWntB4sOesThNUgIFpre/yxjv2y9+KfRxI+ox8t9t

hUNh1wgu7YV0Zg2WfBupkYP/MQqzzTHO3aCFvJ0KJh
mouSaRuAvkc+3w9ASwGU2P7p4zEaZ/OGBT2t/DKfUnuvmnptDQm+VKJmi4LHXi7LqHHTenOeDbn/fk2k

p+VkwlULyNus3C6He8fnUvketb6hUfqL3g8cD+GlNeR+Af+n/b8lIZQzynUS+VqD98IvCt//FoLWYG0r

m9pgsI1gVigTIKZJmgtNPXmoh4nZM2DMfBvGFCG86f
u597Tsn+gez9ml0L4UNp294mBEyRFvhlZ9cFm0aFl2wpWPHIknhCPwaO8fNorlBnkC+EE8oFFi6iddUo

FOcrPsL4mnMErH5iDpWGOmTpxfxGfgHNJ1Z005dHywhO23Q5+GzxWiOCvtqo/WcU804bVn59zSSiuaX8

it05yJngRJJ/Mo7i6RGOpiFylTuSV942kTYGeGrxQa
2M6JCYWhnphx4rvVQVSrdvLroMnxXABb5qCAHQ8CdvxB0Pi1X8yY8SkGr8aDljOPWnHFR9n9OuR/os8/

d5zcbZoZXV7jX0XxBvEKHrpi6VugtEZ1x7Qss6toB4akGTs86S795dq7pJ8WQkE72/qubT7U6U6VF1dh

NKJmPNgeP99sUXrIChbGgpiVetWnwJlIQ8S/+6Ko0O
eFZtwLhwsMVPfbjwlv7Oveklon4++4wP07Epp4IA76XfpsmiTU9avxXYNeKdJmQHIi4QuzFE6TtwEQ3N

725HtPdwyie7COEP8PY1LWJRfxUlWD4udA3H2Ny5rJvAdS/vjf2exFxSG12f1zyUPie6VJBHdYBSwV+C

2SS3AzTX4ePWYOMjvdFL5cTTC/tqrNqtYLBfBFiYRc
PFnle7Yh3XvS837vPR5UFWktnj5EuLH594oECZWQnXkCS3jPhb9zDDSv/ugGFo9+oFS/Q6WTEnKW6pPF

I2GJRx+q4+7WZIrSADaylvx5oAZHR9B+HSa/ymvh6D0Yp+mzvq1COANLZDGMFUm7+yiSynTNdZx12Ma7

b/bmxXoLBtQjbl/TvNh75elN+wRinF+p2llcjj1+RL
7g099WaIjiIPRLZ6h5A6pSQ0q08l57UywXRgeNcZy0qjX29q9+aASDnH43hfMxRjTHd2Sty6CF2Vymv2

7XpxNZ+ZGyrlpvSMW8B0dXqxKqH5eMibGFWsgL0/kjGbupzLIXzni6W5Wz3kDQZyuUsOts0YAH/KzlT/

08V5V5Ghkve5h24d64c9DoUlFkSn8PBJwmZpA5Gnwr
HCkH1XZ9PcIfIBKtCkZVjYTaSl0Vs7qsGzWqmSs5wM58Gyc5+Cs0cZQDmKlqoSAaWrYw8OobQjRz1dez

4bTQGZowlMhdPtq2q16pbqEMMuD3VPgfOWVVX+6EMCtum94udSW8r+33rrDNqWR47ZPskl1cKoflpiso

wtWXyx76Onbm9e25a/igrWU06ejuIp+rveurCrkk2d
E5mOzyV8y34iISrolhzYVJeby1JDt+5WoSrdL5f/KuDbSFcCw6kylYfa2P5XFNu0j6x7/t9rOaMnTom9

3Is3XIt2LidXSi6O+xOvn4m048T2rxpupgjcNp8/fPMMRhPM7A6OFqSmM5ZEa7PFhfkuasesvFk1jVEa

c/6dlnN3khUtPsWrPpFx6GiUkvApG/ttIn9RcqddYw
zxKh/8Sg98iVYs7DaotZJs87Fd2lDnWdjJmJfIDJFz+8OltcCMB4JweH7ZbEhHx0wPplcGDX6t9OCBjm

4of+f0/qt8hlaUKGJBwATvj1H+V5FQtZBXxkFwxvq+PAZ4+pbKEmNWp9+AzWByrrhNQPJFmWEvGN08St

qipuFdtSW1osANYEnYjVWYz7mz+95k7LDj2TFDqwyp
BPdAkMnYHwLHPAlJ7BWAOc3yWVxqZblQYuOqjTgcl9OW99ZZNFro1e7aVHgNBBBgHFHlsPIy1Z76wI8T

+ckxfGMOdiWK3LrBGugUzTCqI4Z5nrcdQc9fApklpG5Jc+Ma6J+VoatRaZa4C2Oe9xDyAX/3JdPmK5z0

NCKH4hCKyoiUVjfAG5fB3cX6Z685Sv/ytNydpOtiea
umDcUm86jltX+7lyhAJijlRCyy6jFjO37LC7gb84eD7tuFUQtmqR91rdItl7oif0ckFBZgeQvp9WAsLH

kd0zfVXiLyhyEv7SeAgWi6vV8kh/l9i1uVrHQh+YkMO8TOgqirAvc/JwroEFilZkN3/RvWFLBmP2Lko/

c44f8VF5YIde48MVvRXkh3rriDg1HpZKyTIt4bml6a
yH6VCbvdP10s6GKTegVAlTHw3jDw0mIFzgeO49f6QrFdS9+39AfNZzgK67yj/xmaKPtovGjGQOFtmag7

+iXuIxQ2fw4vFwc7SRsHOTXMtXkq03SeyDFo4SQPCuOXzEF8uFepntj4KMN7EDaBgEz0M491HpC4EtHo

0yHVSmCjdEW+VLCVQ8fwmNGNua3a9UujDgfilicmp8
BJpwLmEaUhzqV8hbCrXUlnfr4Q69qnSOq1Hq1jnFM7nZdw+j53d95ZbkWXz/Ojzyfjfjkoj2OPRwJYqP

6EDLm7NPW8r35QL2J2Khl4dHIJ+CQWtB8aMW9o4xxfpOQFQwng6iRDfWRdBD8O24l9uzeTqRl6VxHRW8

HW1NziQos0EpZ0U6sthTMmcP1HSEC8vFgqqSC5zg8D
tJaLqosPVKxc0pg763CLdCD9r7BwcZY5KHdKyEqQyMk9yp5s5dP7KkxmCNu/uER2NWNVaFEwdDt6iUt7

QO8kdrZSB85xmBqVvyC79+8BZ+p+iHvGaAsXRli7o+0eA6eJMbm8WiaPKGrnkACW93PJ5dRkrN3TiTiv

jtC+szw6ryehVJpG5gUIG/8ILp+Pw+7k3qjOA9GjCy
y6oqrP/xfWwDUG7M2Dyu6gqHPK+9mlCq/fb3jT0qj8XYsdxLnbTOCirzLQQZQ6ad9uyXgL1yG+3YhCvq

jaoEMu0FUXktd+ZKACxLXOHAKEEqQzx/h6taansGvyQg0vW1YGvUFpqXuqpAqtE0foFOEKNF9Av4CFs9

7WUexxaiNdjXKOEa35+HzFwlzIac9LsITD6Q997A7X
GwUfx9be3Cpl3oUTaFbxbYFQtbAnf9n39hJY55bC7/1DxqlqBsdDoKQmKhy2cASbZz/mtyDXT6EN3nmk

nRBKgF8pe09M8J5T1LQcnirk0AzZFqIsIuoagfCtBkN3LpYDIQk1vjo0FUlEPvN6ScUFjlQ2oSMdE0wv

dlVG+B/h73otdhwjBII2oHHF667cf+hpGtFw8Edx2c
Bbgx9KVVFKeu+TB/mO0aogtU8Qwuhq/vysGfpplerGMUmTdJjqnh4dnAV75EZ3rU/rTrW6mb0/BalbY6

5qgnFrieYVjTC9v8I7omcLX6Mu1rTKoeMK2c1+3WU+zO4ojz/V3ZBMBFnV424fgQbeT2T8a+kYlWYabo

QNs2T5A7SRyr4/Yb/4cHURtsH3QK21z/sTvBNnhudG
fonrEyu5ZsvrgF97lmG3FhUWc+IHWYWg9Bu8JYFjhD+ofFU8rxVghrHvUsCndUafg5wsxYIM/e7C7QKZ

McWRUsUt/raFgsR7FKSFznV2gYyulun1Tff7Prj5MEShZf8Tx6Txl3nGilprT9MTr6GrwP9t2q04cMog

5YPUCkX7uEBm5Fzr3opPf7fKcd3EYk4i3v1/G7HO/x
svRbfzfH8D7Y6J1JvCkQlUltFHTEweXODfA64LbFr0/oBU7R+t6roH5fBPU1+vch9/grUZuHIIRUWPam

aL91B8luCRU3vvGLShV+QEwjURoB7BfpPAUxOlrU9x80fss2ycODKxq1ai12vn1Dyo1YSm6wC+DRi9Bx

TyWSwOkQnUdKh62XzwO9r5rIyL63vCgS+viD7FFJhj
tEueYjovyu4+l5TxTWsK8T3jc1o/ht2r7P4cLx8tb9asQYEfQ5KAFU7FY2PYT1CGT0xkBtjVOu502lD6

Kp3Y4pLUlbPEnlB90RyYWIm1ygzrUPJWezxkJSf+/ib8n41eGgBnS4K5spjUaOLxgFfERsalwZPSEg1p

XZtOCtqvKF+bpjcI/wJUPjwJZpQPH1CJz62UJou+rj
7EqUbHbDaifU8lO8yQXhaSPgf9yYHL1Lz9+0bMJ29/7ImUeSIsesucCpD4RJAwi5fwkCzP6QU9vanrQB

rA32WLHJW0kKRSGzbJwNrZIr82kJeZJZySwbm1/K53dffJwbJX5Ui79rgTlbVUip160aZmd2S8fUmpCW

KRlNxOEHza2r7asum+0ZsYriFe1rGNRRVRbQbreYpB
74E/vPv/jVUBeVYiUjDxbxJQjzrFSTiJ+Bf9t2HXaPcdBo98fJktQhSf5VrSoIlqmhG8OlwOZAGGW3qI

FM/I6D56O332oE1qQzttF0y4kxwirHcXzku3Tj4CcR2+wLVA4wnEbgWrDX7HvRuc+cr2hLNwvGF203hZ

B1VZkeFhlItvhdx8tf+JQ1Z+WhN6ALlKb+rsLdfjb7
qemCZSoEAkR0R/QpPwxG75kY3EIMiw/u4rzpaUY6bcLSE/pF5mypDduGN/I06wmdy2rKB32SbvpHefM/

k8gPeyciom6FrMYYtXQTGfuaOBJ4gUeERYDex8N77mzEio3DvgKcF8ODPkJnBLoq8pyZ8AoAnFfNbkbm

4irk5/GAT4FckuwRHym41VXd6hIaXPpYvYBwJpiQQr
qXbVhkbvdvH7MwxzhxhY+mmsI07isJewqjWyisnnRDnWpjNcWJcMXktBao8s8XJyCrMI9bYuRMuZIKrj

m9y5EXl5OcR/4eHGrf2DjB9QsHiHnAjkPPS8ud0ov54UufL4qGjZs9jQk6+Wv46K2jAAppfvXzaKXw7V

mLLffW8ygVhkEOXp9Uig+I/PlB/F/sQkARiKS6Dl73
lkzETzZk0xY33gVnTF4X9e+7lkSrq8lHkWShdtd7wHSACvCXo0B3XFMAyiFlSArCFyS3WXHajJRvDrTH

DMV0cgDl9MyiaQRb6DuVNOrjWb7MygmTivpFD2nR54PVtnIBUoxUKxfDR4+TqH15lFq5yRS/g6Pr/x8/

AYreWJYPcNKx58ZRL3249kWuLKq1086pvXXOCBf0QL
CodGbudAG7n/Mu3w5Fz52ZkcggWFCD+CW5VfbCuuYLA/xo3C57O7Lreuz6cu/ep+yUmFdts3zpDYWgtv

7J2LucttS6HU7GYyhyX3QhWTMBYBGIlnRSP+tDwQHq2yEWdT38z+CFJpioLpuy/bkg2GPRT344n4FQTM

VfYyjxALiPDGC/LXysJOuVJ5nZRQQG/d2tREyPyAQx
2phh2NIM7CJ46N8i5y2gPsYKUfckBrl2OxA7KuEBK1QsKo8pwy7jZjOLjQLoGp9+EOsY6HHIZkk3+LtF

xEDK6ybstYZlTsTZ24y/iCLER5ygTu7X6dPmfzlq00hQhrXy5L4YssOFPgtEceGNgnGq3ln9gcJByRwr

Vqo0F7pKEnxD9kRuip3WsQi7dD376GWub5GSSgvSi7
8GO1RQRvlXNbIth9vfpsrrkY7ygkgdfauiXObzIVE8VWViaM4UuoIiELupYWeQkZ5xByHsbs8BZ8Q3ow

HcaaK5FL15U/HDHsTSFZlFYmeBCbAUhjVhL5e9QeyOefzf6Lb8K5KX++LI9DsarfBnoeXi445ydBXKTt

i40qMsBmq+/+LAzH9cB7/0Ws83oEzwq3Uix2r6o/0o
34wMATvaGOZxtgaiKp3xBVJCh+EEC4feMcTZH6qt3nQSLqx+YcoJQQaHgSFB4hLAgO9Ge8eZueuvwaCb

I1lcGnzopduwr1wH2QkC6PAZRktxwdre/E3prVso2sjZLzv0yNU3xRXv5TUP2sQnLsSUBCgK5FwI4W7D

okKK3aYYt7GmQDYJtuB+7qqK1+aMiY+gVJVGcs+hzU
qxs5R3XDC8qBtSEK/YfxI3ECf0Jo0VoVr9230wQg2KrUxXwaZo9GvziFXOBDBEnOw1MxT05tNXC+gAxn

7P1Lhc78LN/AYMOfSVSm/S56sePThgfMnNliFF9kqz3dMWA3/ERYeLM24pt3P0otEnTozXjyBn47efFd

XhWXU2ZHJxnxLE4BcKs7MlbFbbY6gCtccdPHVfjuOA
9mzuESirXQQ/4sMkIbORjASWItt8J5WFkgHNzOAXWjri8J8Q0c2c+YFrY0KbtBwgR5xdii2lT1vua405

dQbjseQa3CwB6Q6MYC9kjk3hczC4RHJQLkF2jvt99X2FM+uAG46pKI3U+kTrMIfqiaygsEnvk92W8MOF

eIsS4/ZumH+Ygir2LhF14XgZYgxTc/mt/FU/Zrcb0y
le2anvrFhyGMlkxdbVFUbEkr+YCFG09bsKSwKKxQizu+Q4lq50guoul571AErT4mPdUT2MZ6t9VJ3UUN

8EKssL0ISazCjuj8FSWxyb1MmRGCRJpeLi9DAp1X6VOaWoJjdL74LR+j3O1P4he96tbVxT6F75p/7o/h

NzbGc6wnOO9pm7Vjupq6Cobh8RYYpKkxy51Uyd2CZs
mJnIUmkbs7eDS4L2jAO3s2RuJQj2niK5XVTo2AUJ4Wg4ovI6jS9GUXPLAmp8xMBaraKL4fE/HYbhfYL5

/cgXmNxUY8BEaRgye3whkVthlNdl9Xietg6sXcd71+lRUFDePgsvgCafWRW62puQQg37aUqZkFDb+Ed4

w9YIwkH6t5yW58lHncl/Qqkc1j5Ph7HmmbIxx4SrMz
N5f0hjA5O14qIq0B1016HvBrixGaVEO/b9tln8ky7kmidiXEOQhVd8MJb19Srwvnfti41jYK0fg0XZf5

541hvDoBN82lrbNlNWZHRejKgRS8JNxbDpksQtzIg6ofce7NnePPrRJf6I+bqiizrSPA9RASP6rszz9M

oDaHeTA/wlT2Nlsl9vwQvX+7zoRlS7SbM2+75ZHOlr
1M8uZduH1jTc0jpTQPywJE/la6MU/+uf0vILIMIbrq1NI8Yun+Ax9IQb7SVg6SmTUzLwxrOgv8yqzcyk

i2pDaibawVlTZgYZ8aal2Reo6EeFLcPhKIsYk6dwXbSUDKQjYlzhbFG+pcTkJtqoR/6yYMdp1oZzrgcD

xi7ptSUm91JRm3UogmwHIVqXcIJGR43BWZN1MgjL18
9NeIydEoUrH5+O35KBvPyWs8fjeETfhBPlbVyb7jdpWmzuadBHvtrQPnH4rNLdvydnV+WZbWPpOV6Ar4

tNH9OLn8xZDxirlplVjjpSzb54fI/SjCHc1p+2dJ772Brcgf5K1I3D9dvqaw7izEQnc+VltNft3kuc0f

69CeAlOGcsfiFaw4GFfoIWkaVno/6r10YvFq1i5+16
MM5DLDvpBliNba7ZgLdzNC/Oqiw8Tg+lC1P1+vjj1W7rhzEQrN4knLNErZVtsJAlcnlR3TbLm2WXQ0Ls

i9Dw0MBLzd0rVfXHthu3w+zAL9C3oDXt/+26CrjRtZL71GKawfDGmOMvD0S501jnmCnFRHScVyYY8VGJ

I7h0O45twaxWGEHqoOJ7L+f3+3Txyh+Nzv/ys7RB75
WxIMiyWVbC0HSnKc5gV30hefI9KQ18nuIDCbR/TgEADHW3qUc3GM8iblRDPrlxHooxz4Ib5vERI2xlea

kRok33Bt6yhSB5sBlh49ZMKi/FoGtokCXniFisQuem/IfkLs9/qLTI/RBxOXe5YJZDRxJaPpwy7kwcn2

HN6YkTa7aLRgThPM9vUQ5TdR0YcbON8oLCYUvqI7jF
Qs7G12OxG4evWgc30hLAmatQG8lgeChGnU7vSHM+38bktTDXJi3J+5F6GOhmjJ77wUOXQmAvkMvu10Qh

I/nuDYxd8mm/+yNGH2+Wou+cFi7vjTMpRee3hkYxxji9WV5iliPfOtxsOrk4uI9aYGcaGPcpsVFF96se

k5vFpywyjC5krnEL73j6EuBT6AxVfOirnSMZj0kv7f
q9cWpLaqF1RgDLgG/CNM9l0+yeOwIWEI6SUM/gn10PbIdMIzTTi2LhKskGLlArZ0Rk7b2T3Um3QMJw8a

ojrs5nms1tK8cSQAe4ZwvhbKt6f6akSKXdfCDuCP8pA7yq8AjSAHoMMkMeoyrWa2mFq1m1bMlORBMbgx

vpyxBZPRYm8W/tvl2F8VQxG9p0RpZgdpeYYMJiG2Hw
vW1wNjnJ+6PX0g0N+RXqrBVFvd6DIEPBQxGsmW8RRVEPUeX8Crqp3uBUG6i6jeLSRld7pkX9svnjocpH

M50/5nJWhvFaswMdXQnJ0nO9QVYWRzaJg5+xHWg+aAiunKDS4KOiRViUn8JQwmxCiXl9IVB6B0L/pg0A

C/g77mN4mhemhL+sbHz+vOx3DbeS/CSm706mcRRrr9
yqaACZtDb0Q297Q6uJryNU7d0wcg8m7ulT33mjtrUNGBBQny8UVYqCy/q6wksH5g6nhEPy+Lz0Jp2ZS2

dwZp0k34QCtjdFb18dw8izifYxEp6dG9AU5f1v/85etyQeF9DJFc1w42mjDelHi6CltcOiEbVqjq7z6H

SFpADeecTS+4ZL6C3zRqpq5Jr8hDyfTiCUEldv4x3r
tXJtv1sbPMNv7zAtn0/hwQ+qaNCwnDiadj2eUJt6ywK08dY2uwAyT+JeDI3+kZwCiHy8OWIBhiTMAGoU

+YvrrOQgcrE1VcK39Q1CA7vWoIo8LC52IuD5eid2L51P2Qh162kCG6MpbtHuShAaGdhcQTXlst+Wo33d

PL7cptH8H2VqUswUlnZhySQxlXgcRh3ZYvzPIagu2Z
8GM2dJhP8W55/2G1z9w4boDYxezr9MQvudm2klI8zTXbL+hAfAWPi2VXDbGOPw8/ZcH7vFCA2nbxC7m0

H5e3fqGfeKujM362yiOOoX54W/TW6EOmjaE/grYyYnZxzuTT3VpUY+7ZJBZ4KKJ0B8rkROqTzkP/Oy4B

36IgckT9ORMEwhroUYHJxUmpVB57N9YchwL5XDc9Hj
WCohtIfz4lrtrogCvsTLF6d1GzZFr8Z3kiXHBGEMrN4jFoIUSzLdbiuPMKBgR6UazIwUOPWJ6EEe0bSN

UMPXZGIMBREIw2LxmdWj1sNFR6m7tCDs4K9KsXX137vSfZ9NYcd2O7L4BRnFTb9+qahJtXa4oyOP/Gpa

8zfav0FJVZ7H4gJchx0oB+CGRO8hkidYOwUgOPtYoe
m5MOxp+mmIeXV9DMZNR3L1MVLAqvhaz5NO3sBo5uSjM1a8mZF+eLoF6VJuo7UMIUcvG9QWb0rdQV9FIK

3S9gdJfoLfudy9urH/X9RTJnKdtsZ/Aos7cH20s+E9r4LvBY1OPKghL4zYzr9CCzqOACQVzjr+NNlKOB

N3n1Yq9NS6AZUAGOXK/14IWK8RaOj0jWUNIVnY1lsL
Lb1vAFLr1Wff/n49tKgC7OsLp487aofhoWXw1AiusHl1XTv1YmHSYTA+G4Le1nV9ooDwu/3MO2GD/gxt

SE0FQAcqyeLGZU1zRrRSbO1Eshu7q8s5KqkUTJg8Fry5ia9lQeD8ExnPNPM7RzIFbrVoUXICYimOOJZ4

X0AxAR98vYjOCvPyyL3j6gEaaVg8ZkZS4WLyT5oU3l
/eVU0fPqmOS79901azZvxxQegWz6dbEtapqn5lcucgBRmbkNklGsMEjTEniY/+ZSoAoXkHl69WNSfhYl

sP8aKwjgp5myJbJI3vH99d7+H2f0f6UcZ08HGdVz+3yqMtp64zukqRH+ZnEZ5iqsUb39ptp7fZjMYEmc

hJL7xI1BXEUpsXdAVZwzoEo7+i5cLLTz2ST0kx94EQ
tVqo0087vuR6N5sdO3+Qu0x5AFa7jH92TyXiqCE2Ee72av+m1QhSpK5LJgnFksxUP9xK8Ubq83MQuA16

upHYDhAomQgisKE6b/1XiK3ZRkjRqnLGB0BHW6v7V2hPpclh0iHIT36fGsEwIJ89NQSP1LYElwAxEEmt

Drgx6hs49fzRliYjdsbl8BSNlh8vAHXN4Nn7sdD7Gz
M+OnqjkjCjV/OOnuKo09qcyxR51tBYKL5DgGaqg4zUgEiyeZwsfS+ihb8Y+jAyL/hXjAoJkdwJJSbAa/

MF4lkjpfJ/M0Vp5uqjD0tuovxJjSzmMKK8m3bqglsnsVTdtCchMd38tViJy3grzu9LD3+etWXZiSRJSI

rnRke1tsNmFAlbUtiC0Vg//TsgcXbj4R/5+hWClain
jUUXiD9y7FYdmDRrjUaEELB7VdOykRaDn2Y3s7JHvSsXPKWAV2HI5TtjVf/e/Qjayn6v3uKhgjunusG0

50slu4E2qFi29ElGB/fauCGfziWgqZ/94XxczsCwr4GCYyc0dgPFzEnAy0cdhGZI9xyexZGNd5x8u3jp

3Mf/P4zMpghI0XH+x4hrVsMcokS5TBram0WT4gkzhe
hzPLWbapP+23zWoeGKmqQyxfqqysEMxm8Xp1EjltSgNFFU2n9Wbhsnq+MjP7byJXOT/oDpnCd+apj6kn

4kiluGI5+4jxUP4PgdxknHxMN2r1SGR4xIqvHaIenskN0HL5JQ45DR7u6QelmAwrSjJlkBB8zrDg1pA0

iBak6nlDKBjMqF8lGqD1wp5ydTB3byiSB5yIhJT31d
EQX1qPLCDzPHBT1V6RxHLCezNEMnXii+TJhrM9YC10FQVhRXM2mkBaujkSmkkUn71So6L+8Fu9WBgelH

YOrlzhUlL2gCmGOBzvEKNFEHCLMVxm27Aon021VgAfMAelVS+CjZGoogN1M52v2y4TfDpS0TFqZwaqS9

zp1fVxLageIo/D1brYavQ/ovHk4p8IVkk2QQprXW3O
qHeaezTLMAtxkM6ArtYxPkGbRkOoyv/P9NUrMfMcV2xgDSfr4K1ZaaaOJK3EMIzrJWVUBhbyUrEgrxCs

StWb7a+mKnfjwRT03vL5Z7IKiDqQrFcc6v9F0us1jPqNcDeBSK765YNSu7q0nCjmB4Q3xb/J0ELEe5Lo

d+1ZmDvQg/Yalq69Jz4Q2b5MF9yFohyUuTCAMjLJdy
amqlr28Ap4cOEG9BkX/7yBVTUQJpPzDlPpi2NLTDhF0guRc+vjxnvvIb8QLGXnvt7do8Bml/grudRmVA

KpkbeBmqnUSHEiLeRzjtcKx/BzPHdSP8UHrsAwjZFSHIrj7OUIpVBmeevcSL//RO9GQq80KpKPe0HAvU

TGOHwgtV+hItGyk2V+3lyr7aMpyRN4aFB8JpzBrgvq
7nWmPwvaNHGmenyWIbxwHBDePJg/BYPFWfad8NupIbqi5d+R+pXURnmJi5k+G/q/CmMuOBppeLrdmYva

EAo5CNgmjvw4GtIYjcbMtMkwLkFEbq2ShsY9akuoGcmnFPSQr3FYblbX68NmA5+uqlq3wL9IOuMfVuZb

o0hq5FcLPcRs2S9IIlvnx96KlbpTa+tL9LOt1/T1wF
aZ0/3E5Mw8enl2UJPGY98iKReQOIr24T5QPmcrDfL8reeRt1UFUWy96ZOKZAGzPAKEsRUKLLBvgpvzNT

TBhonPQ79i3rzPwXYnI+yrP+tPwu78p8Oo0leZJSAWpitCt5vwM7N1nz+29NtpifS+MS5QBymA7yQlI5

o4bjGX/585i3BNq5OVGhJFvGZF57bp/dUFbkHTarb6
72BlBNt7W85b1kh6m8FmN6uW1dqplhjstSlXHbo1ypJYFJRC+jolAwUFPF43nn3XgEAMaPWueIEx2yTl

F/mVt0ZgE18wV808eJBXmNN/N3tseJeUnBWk4/sptK0R01hl6EODMKj9PF1GxFa/9ArX9JFr7QElq8Zv

8ta+B/T5QN1yNEnaeWUPKMc36Hj/37lOA4BWv0M1JE
vW2sZE0urNq/R/H6p9NkbH4AHBUaAlEeftF17wjWNuzS6zKOZMkIY9h/GDwONTU4btGqbip37GzfooZv

jf0XkwL65x3JNPbXtZcIg7H6nYL14Q3BxyuSLyig9UYixu5sI5zycysTL18Z0xLEvcPqLkg2D37tN4WI

6u66fy9muQWkmgA+NdYD7mlzc/miOF/nmXBFjZV7cS
p+Xub3URZuMl8ZTHHXvTZ68SrqcZw9MDs7YwBwHmjjPEuWTeLbmB1TK2CiM7Fm1vOL48rEYXr+tiMTLx

DxpjVBLjc7m0X4Um/B+pl8Yt/LpklCMIDw4SPBmyL58xTX4nm9gtUV/v0bM1ov9OI56IfL5KcLn19TPu

BqWiqGw2omOuHOK3MwrxmyLGJzj7rG11nxIHvTnhIY
KcVI9N5MD25a+Ia48+/bkZffOwl0Jnz3fHm4lmdlPBwtU3ztJ5CD0RwhX2Yt9zqKujojjoPY2hCMre6I

Qw1T+ZboCEo/LRQo2gq6hJEp1tqodK/TIe/ZCLOXLis7VWGNHKEuG5SSOpNJrWkT8ruXXG4R3yhUeC7C

sbOIzrz+dGI8HUvCP+BK+oVCKXqRAXmZRvwQ1yC9NY
VBx43evMJq8LTDaIiGp8zwmwZ845Sgw73FtxEF4UmGlajRd65JG/75undqX2NUYfW7xN4P6+7wRpNIkb

fUovQ97lVCwwxybveHHAYGYZ0cZDup4jIwO4n7fgMmbjFifHJvKIb1xHaN7sII4a1QQjOwfJEuNbtSjD

8OuXrbnJTDfaB4qHKRZeuQwh203Q+u22rLv4ULGlPP
tlSi6LjVd3PAMDaa/7ESoKPty781ZEdE2QoUFZXcMFZsgR2nWr54p7nNSlWwYtoaxpMjcVzkFVnwkvv7

GEfKt7Pn2j7UXc7zd30UQm9bHlCQ4E0BkClNhthX5mHAPTR2DcvcKSp0dxU/KedjCPYwmwtvC/XyRhEs

58ce2+L4AWfIKQ2UJZrnvKe7VloXs8AaNpQXn6g4AG
DYHQu3UP+FHkHYO62ssva8czVtufuhvKSF+aJoM2idO7De+tg8Aec9Kn6ANSlBUqdLV+jDjSnNYokz58

mQAMEfsF5ktaMpCEdVmPnw9+AzdvDPop/WwvwdkLIeTRqvJOJkiXfE8/S5NSWa5X6y+iYfflLL9c07Wb

mtroDLmwrMu1NNd5PgAtZIU5Aj06PbZFnJDAZoNSpU
i1lfFqtX/NF/ky69ovMi/hLG+1C8Yb6RIplhTG2EArz+FC31lrzV4Y/IEoJT5wIrvDB/aOv9ekGJ8TN5

H+X4DI0a46z6oXUMweWbH3UY1CP4Z2rsJHzdp1pSprforf3Fyygz8SSm6SbEppR2zYWIQVPD/5ClC1vZ

JYDc+XbQnGhiw8fax7f1I+q92MBiM21EfLowcVfwI8
X7xaJo64HsO0aWDGlo52isPTnXCyA41bf6lixe/W4X4btp3WyH8fQuXw/DnUaalSBkTvDK4+D2rmwOsP

ojunyTv0Or4jVg6fhwFWyhJ8BTDy7dGMNdFsL7tmqRaaP9g1Jx8n0iveWUoE54h7oeP2SSl5bV0iUuPb

rbAU5InuoN66qmifLZC5g2Iuy8JAsZDZPJtznc86ta
L5CJOX/WPo4oQmVjcGsf8AsndgQIZlK6TsQ7mudk50LJv1k/mZDJQEN5m4+Wndcetx2Vb1jbkzXkoBS6

Uyen+6Y5+FtyBKDREQZTAML4UiyEo9VTwGk5M9ld24Pz/iYhHab10TCQd7OOEG8AJ7H2qFbS4S5tCARDh

aAhJ1hMP8B5+5EHQg+XUIurX1/W5g5afInYTUaVKHy
mqtiPgzglhH0mBZJyjcnJZi0akwEniq7+hPeotpv6+TnID02rbogY6Eczjdgo0FkhQXXFfZtdd///ikw

s2OjM9Ybg3a7yRMv79FSfcug0MS94OK5T4/tK8fdCU0kDj1oWL22W2/oSwi2hQY/apaFvmvuAhTVRKnI

98IsBBtKpwaxAQxwJdLZX4gyLlDeqHJgQIWTKCh9mV
YNyPgQJLDzdtwc8f3oDekT3XDZS/ZXQsMTICkTXHRDMra/P2QEFI+iVZ0gNT8UIdxs4I3cqBYQA0f/Jn

wOrNm1gI4ZlbePgvEbr2vp49n9h5CwbEO/FLrYyb+uRo5iZhNrAAp24XpJMi/Wvo2Z+lBUs6VurnBHdN

iPUxzV4UiZAPFXvqcNXecjL2xHyY7SWddzOySQi5Gv
6bwSBtMtXg5vvn0LLX3LWbIGnm/fuxVXesmdYnOu7qjALlSP5TNywLHxuaaDh0zRq8RTtaXm+fh6w8yb

jdCLwyOu28DO6cV309niE46y6Xf4OWsMeS+Wh5ayXyHA3bznU3YpHEjbVY4h5BSLrnvra0uCzPUxhCWN

nFnQfNNBX0iiGbngVztkOsDFWodF6jP0HucUcNwyGM
dnbWxku42V0pNXtVRl4bjb/RIQrlGIiJGPq0Qe6/eEJcuMmzgLycUKF95JAs7WaZUI/pXGs7LBbC+Sip

3o+iZ48yNLBWrhzVlUgYMaSHP27tDDm0t1DheA7HsXSExT1cxNAqZuJYrAWJelZivVnGB5woNCNeSQ9N

MPLpFmf9S2QOI5QqLrVuN8ocE/GeiK/6WRx7S4F+4B
YC9YWFrolcZLIKGyf/OrJp60xfpGw2+9a1qjjCvB+262bTswIRTFq8FS42Jbyc4urD6lEIYpDSXTgY3O

JhJKHGA/TlJkdj++0BuNuZwsoCUjB8nb4lJceXtT+0upo/JoUFyJXVDnnl9xz79pj0xgzOgx4yQSfX9J

KREoRJfnGPVGtH+atPT/qPFHUrV+/MNQ0VkJ7ZuyXT
o6U9CbWazukLD2akm/J8uvQB4upz2hBtFn2qC1DxJse22DEKKQxF14bKVp9+XpmOMdY802gxMQ436/I2

UiP7J0xMJxzATy3mondo3V8RhUktG6oLkQk+OlO8TCOsFE0tnzMr2IRI4Bnw/G6KGiDLkMt3npRhxRRp

0JCqjTWkD1yVVIVIdlKIk6fgVJqIhzmv4TiF1QID4/
QRaYXNFLzLj6AableyCyXJScoBtvv4VlO23tWpsT1D6p0og36RclT5BsXpDoa0LCSTwsr8FYahlVtZ1b

IRVgd+QzDBh97Hn7FO5fZBtxk8iTFoJqGJHXQb/qSXp81DpN1ANHCEB1hBk3FOP9F53cayfMeKU/OXyg

HPRNuD3GgWBbz5nAD3BEVfBER3Xnhk0buoV3VwoIQl
TteEG4Cvez2Xbz33qkxaI0YqSkrFVwXiTnSHrXnppBeJAEfp67jaqFfXn0/Xx0ICH4SiN9oa/j03CNeG

+vyjbJab6WzzGhbOFfDP3xRHVgemUtfsnwQ4d5k/0CpMSiI8K6ih+tr98R6WIrQ1Kae5fhMRd08PCvqe

IZHP5WjVt2uyt/6lMoglcWqUJb7lJH6bdVBGzkl43u
C1E2n1kVpfjNTGlbiogWWJQk/RD5lW3gp5BEfyqg9VR4Dqe0C8G/PLACbgWy6I+bBanuvRQh85ddDJv5

Rbl03kTkIHxnaTI2dogPNjd6d88vLJc6JlBxxVJ2MJM/w9P3s6BgGl01G+pZma0/OvciM90iKfEC9gLl

jG7dYM3GcUpns+xMtKUysj8WeU6DPV9QjPXPBINw6Q
e2qsQmNoH8cIkrxc8wpB+bKSVBZ4CdG/G6xId5VlZVyNked0bSo0UZi1Tk8kI5uDrMsxq+1GflG3i/U/

1zsnQA7xt8dwA/0hUGvAI81vVDElJ8dgcMh/vU5mP36v//hne6gHqt1gDdBE4mfou8pBhYaL4ULeV4/B

9IxnDBlF24G94VUkKi4xnb3Y8D1HxVvDYkFIgP263w
vt33BryhlH2ODwlOjaQi5favqaUgPWqmhomolGr/DnNHBYJdCWjcYXg1JASrXlGL2X13LI2hCEzyYTtV

XEh+bnKFuNoK7DiFl8Q8H3peT9sM3NeJQIr7BLS19gmDDJf78iQGLfJAZiPabMJuynmVvGJByotqvCal

GgoQT5n8Ex2wqJ36aWPdehh2OykeyPsFZ0FhamxulG
jyJq/3dkGKktnQUHGexnsvEP8x+htMNfI/98IRi3vql3L+7XfetLn7SNG9v6uZ8QGZpv7ro2x4it432x

pnTvEZnlDHGYeOTA8jDP84XP/NslVe8+wAnmv5fEdPLAFmSYXr7+LHaCR7vK+ByQJUEaEdIis4LY4EcT

IGdD2Oet58NsQ/6fShZfqqEMJ8f8hCnQEMPhyLwrdF
7Ih2c3LtKYNqyJhwa7O5t4LCPUdugOnReOpnVYYT4sVbpVfrZh8hUl/h7c0NDd7W91c4Iz/MIZjud8Hm

pDAR1nCjzZK09GbKioFLynb7GSBSh/80wCp5jmNlMfzxEDTQfHageZbX1gYRBuftzZAq83NhewjX9+3o

4UBGgcq9Pfp19wKUwss4SDWYZtBgX4fpB4mYC/B2TZ
u3IwA9r19fqtpRPFxfXVmSqa52s+wRl+WV1jSueypdFC1QP9vC3bWNl41YZOjT9QqkXmxJTJ9rD+jn1X

PJeI3qjfPO3L//ECP5LY15+McLOMqI1x/qmIMC1tJdCzbVRBJmlCY2Z4RuboQja8S3PHx8tzb4wUopHj

KUgkbyvoFkL1Ck/lrrnSEkjP57N9DA3suS4tK1i+vH
D15MKXvqLX+PMgJwakT6m5dgIYFC4h89as1bn0Ted50KqxNl9XiTw6rAIozT71u+RAl2Cd1YG0yHAyDQ

z4o8uUkoEHeLENT+PRvg7HuDSVW1QvccEyDOL7P5ZqILB/7O2MQ1XdxLQAxXm+HMaD/PSi5K+iKIAOqy

/3xtPeFUWoA4sFHNRU6/SDvQAgRVl7jb2DcmabyBLm
c0PJxFceUZeZYm2LhTiZGyHsvJKgBlB8KKYGXmFAoAWOXN7Xky2Jqxtvm/cDG2/7x/KQ5bqw/dRO5dTH

ohiFf+3tswvwyWODkScdaUXWVQVDE0SSLQFl9vR0qf7f7vM5khlacEvqI3zd9Fki1oMe/AAImaE5Yx5M

JfvDj/yDkg7v1ysDJZAmvhg6GkO8RFTXQEuHBWMKv3
krKofCekyyWPFVQi2X/gVWj2X7ISJlOcT5edlQJKIAn0IM3LGik4/j89GqX5uA79vw36pd7pq51UYiLt

5Cfu6lsmTsbe8jzMd+EQ+wgp75MLL2xJSgtz5Ql4oqB9DV/7LtJSQ3/32EKHsciLaQv5RjWufENa74ol

UOng/Z9UFtHhs0Uh2yvKgYeWpUSZoFPNEH+xfznv4p
dNMfUR5HYyt4LUfTJyl+clLdmiX8Jk34CGrZlj8xP4AmwvfVWqJ+rduiY2g+rEjjBhql3bXLUn9ZFd6c

9YjFgqY/9VwmWaestUU7277OGpxFQrt4gCeEuLOjIItVQRgwyJsmi/wbfxMGHN5YDYC4nSeQtwartm3i

T4UPxpyBG51/YsIa4KJt5ldeYD6vYXeFWiv4Q107/z
ZLoSfM/PGNnLZWLJfbfvBLilWMLU/0CbrFlB/45kgOtd6Y9Sp/YfRZ51jKolVztvLZPOHwkE8Z237sv0

V/zp4fez3kOW2d3L4/SApmqzSGE+/euw5zJ/eZBi7NOGZ8udHUbE0msQAoz+BzAfpsPei/cSc0QeQyj1

wZxtQZH0Zr5tJvgRoUo0PD2AGxPtcY4Vv2WW1YMDWN
gwT0mciFSPwfJ/4rSGm2RxO/gmQbtKuRYwUX/osYYdrMT4Qqi2QK+FxylThhCb02sTFUVRCGIq5slmcA

3Yi3L5eM1hDmIsZmJI9UrKpTQUrjNAVjenB0AXE4rVzI3ScuCAMXL8ROE7QFF7zxTQe/m0pUfZrHmfGK

KgLk3uvcVJIuoD0I8W7welSXxMv0ndH/Zlq1hn7Z0s
SeR3Y1RwEXZ7pVPTI2M5OHxM3lXJOmY2H3FGtii48uAt92dQcKK/nTRki2uGid3szqsog67uFT+qgVYw

5pzFVdHZJKOiOBQChxCDlR46Gp4VeJix1ro3Aiu+wK60Oxq5Hhzfp+sVKl53Iel9bc1j5wkjNBflGQzV

jKBoOFfalAD/MX/RaTVcoPB0+apdWn18xZr3SPdtFz
ov2iM7lu2QXGW16AXyfBhi6E84HBaBWqG11joL04BXacALWqiieoUPmRXWpM5SU8jtnQdU/Hb3wLfpH/

fY0vEqNwCl8SreSHJB0JbQ0Dsf41xDcs4nP1Es/pBYQ74LgOIvQL8PenciLiKgf28J3DFZkn3I1fj0p3

S1feMpiLNttRiTPtxI2XaIOMsHXBZcDX0//YnMcqMD
Bg+X2ZQH8dPMYc6fOkivu3Gt7jcdd4dM1/el0WEJjs9e8bXAHx0BhL+FlWA/1OXAXRiBUa58z4F8Nh+k

XFdoTACo8bfvCPCsdD1+wnOhju2kXBzz33w6pn7Dju+SJ4rB7CLBnpnmOq9axKfYidufgJl2QNp/RReK

vdHd++dFdw+E9T6wnDiVZDIpToxKySBxPi6H+6mSN1
7kBrrVy4ff+5J2gehl7l/qgVxw8E11fDPU6oVvO0kCX2WBo1I2awPzO+amoMvgpN7pXKRXGhLkRuNHbI

4JuoMIhoFVjFw014du0FA9doMXDRswtevMKEE/2qxpNSkfTfKSJM9DcolYANMCs8KhD8GrVSiiHcgrxh

n1pWuca208fyH7h6uPJswPhmJ/cPqv5MOw1yDSkijc
iIMNsrbs4MxE+Hs409hkl2ptpd8PDhkBtoTSwNzdLwaNKPNZW7pYtwii854XeM/hzjWO/p148g19J5iq

QK33plbgBXQ4ZG7QO7oapjdHzBn49mHxCoHWjkH/BJRiF2oI33La4y6oCyyDMn/EA8FLY59n69gh5Egq

8Mq3o2D4SvCheUfDzn7XD249C1iJgU66LhOYrnYhwm
49xxt5uURwtY/tbvRlW0fd8epuq08RVrLIiAVZg5RVKBYWj8qohtoQgwpeBF4uY0LchlBNG0BZPoRR24

W5x+8iet0SljaQV6YC68zeJwsZeB8SX/RIfWtk7CUnVktQW48Yfom2s92vXUgNwCXTNS3MTmNp2Ei1io

1pi53Jx9xMATDRuaCe6PnHmEhmUWQN1BXBcJU1mGvP
1cctlm7WGytY+j75GUREc7Hko/wbez3ynJos3oUWylHJqZbnsyKGMiUQr8//t0dw3nAQjmlYA39eJnch

kXNpuQ+wT8rNyVbfiooB/3X99e+smrENIJvi1udL/Po8FX8OKhAtrxI8aoywskP8pKsaHSkGYZfUPnp+

tQy46qp7LDJpJHcx6E53npT/m6J7Kn3pthhU5ga/Le
2ke2QKxUdJ6OtCtM6bx+3nbsNMliiFIylPqFjUp4LrXrwrBYnJOxPZU0auKMOPfFgzjUtO3HT5IIogVy

3CM3M1Spo6+JpLoEi94vT5qFfnT3ntR1yK6mjiIT1slfvF9UaHHuTJNMK6hRsh8c+Llb5pdehNx3UVsJ

lsSF/CZBpfo3U9Z9EDcod19UbbaCNCtzmBd5ed+TAg
3ocpGkXbJ/s6ecNr4oK2wyVAhTHjKrHK3mimUZ0w4HPpm87Q2eker/L5qGS70Me/qfqhJL15j17JJeF9

awy3NWiqcE+GO257euKVxhab2S0h5ec8vdANB1L33ota3L2LrQ+rS0gTgqDitmhCR+0YoIjuwlY7XWXD

X+0jZZWcQ+/io9mMLlV96rTb3T+b/vgF/o8MCh4wWM
U1mPv6chBZq1V4Xbee+10VMna4d2wRl8f8E9JUjWZCINQ294jkePYuRkIVXZNRDhF1AqA/T4ZVwBVygB

NDs9ap+grgrvQH8/7eU16jOuotMp4txvxKS/tczhp8jyzDv64/Wr+pdY/Fiq/f5bzGddIRc+RMigz2cg

zhZUSEVu+5DbyTpwxet5dq5cYqvtvAzJeE8xqql4pz
srxoYTUqtVHjHYVm+J05IsIa0SviB0Fstp9rXUo4CT/OY5AD4ri3mfFiYRaqMNbvKONlmUvT2pzSsB1t

VqgxsNEvm+3qaFxCatb00G7pGltFM/LrH1a/dA0ed2+SNehnPEq0JV9qVyEYX2/w83kX85OmumsXJ4am

hPyyX62fG+Nb+yQh6kBJym9B8VkTCImtv+vk7Dw/Ks
0PAcwSocaecN2vEIrRuZfJ/7Bvm+Rwh8GIlvm8Hiqj824eh//rQkNEwP2a8NwMn6UoLA4F18yMaJOT+u

pqy+lzWXf9+oJ8dniHFN43b03txYJdP92YzKmL63kgzy3DPA5kRypwZ5AKR8qdfb8/EqPfZ71Y85m7hk

kJoeM/WnSiz+AD03Qsxj/P1lhmQbpQPZQIBTlT07nk
EBnwKync2SFtXZkcN77TxBNwW4ZpiL0fwKiJlVklM3LmxKDFCG8+3jbvkfdL92dEykJOTM23Yw3FwGDS

Pc4WIJiISdFQY8zPnvTRzGEEUxvwTF3Jl2QuxFtQ7we8kJuCxDdN740WL2ssVhNi3/Bz+jAVL62H8NV3

Iau/RpjHbLoHzQ3oZxlaI1OHl6TUxeCbCi+IqPemLv
jUQjC7oJ6p50+sJRPR/xxmfTdvWnVw/flLgVaVvTiQ+psmXcuOly8+uktqCjCa2WEtOm5Qecg1O6oQUZ

DyoXc/vJV/katSmDK5gaGKhYZciiyBRv7e+mfnt/RLY8+2mEzAMtZn8Wk5GRgwH/QSb3PhspmoH2W2Qx

PxrFnu/Re6AKpwXxymRjSKrXdKubiTgOOeJs0ivvEL
0viidTs9S7oWDqwtZlS1fRlIaUhi++v2L6vGRa6qyKTNLF4t+3iZYJjlc1Zjwf8Gtqt9Ook+YzRWLTvz

7XMa4vIOejkT8SFru9dqc67cuxcvFjHUR1UipfyeRhmAIIsQ/0ZlHesTb3PrdJtDVk8ErxicIqSl7bbB

EtaiRrgRVpwG1CvN4F3DU9KtZih5/4ggnOa1t2uv5f
nkyC/Tgu5MCHLHkqoZ18JBaL7AzJuRGwGT86pNYj0tCyT/TGgmmOgPxfh/hCwJsqtna1xBnCpEmSNDjF

MYVTtkAJLvrwO4JqX35KA2oXMl1lgBW7hm+SggpEqd+no/1VgYehe1fGCZ8v41UqyUg5cIk2avx45nik

M29b/N8imnW5IuOJ3ww7Zr0XbYHZqUPsV8WsMW/0Rx
WS3OV6RoZm0ez/t81jEO0hwDuCbAASQdQqG6e2mLOC/tBqbterxZS6lDqxuh0t+7SvtuAO8zo+9ijIsx

lW5tCmCwKTAWf56N8+bJabqld1Itn4u/x6q1kmf55zL48TLGZchvpcoAwWu9Ej2O4MeP7ycYEiBQIG1o

UXARfOjxdk5amZuR7x7T46ZrfbM+Xv5XWRCao/kQ9m
c/H1zNfNRATJ4UVoWQRV5giu2mMYGrm6h55f7jElMMk0BKIpWLhYO747jGCEoADmueT/42BL2WQCVC1v

srAiY6tJAFG0tuNy3Ga5RdG7CPDK1YxPjQ4iBjvYOJ6G+Jy8yYheQNZ6v4SzAhrdu422MhCYrkb/aE9u

buYt+kDqWUUXKdw0cEwM1wNbKZHfZAoHK5PutCB46i
ofpR2W+HxCTGGxsajJ6CoRdG4UwZg2TtUvAW5rwLCkYhi51MOx+FmLqBkbo42tfRKcWhSHBeXM8XCTxv

oZyJhOnESQXerhR39aw+6Ajj8OvV6R6giwf9P20mjPvpv/cXIzFbrOiBi93bLxeRVn3+AaF0zUJMQr6k

hqAjl1wC37h6QriKaN6AtXT4j95Iv5QRA0VofWb7xK
zKlxkl6uuMKcz//aV0URO4RTHSjv2CAR31dJ6cE8M4uwaCpvSwGy+P9Spbu8IKIC8qq1buXrkpmyZl/X

VAceFE1Vala/CXuJj10HeUn2xdvS/X94D+uAfS6tseA7WhH1zqJ9zBJxPqT5GxI8LzO75woZinv7/JLN

+3BkObGW0urXWAp2zhMZyMGR1F/zY0Ab8YlDxtvyth
kmNSUArpgeyPInB3ZykCuYK1cxouLXU9TVdO9pW2oJrwVprxf44X2wwZoF1fx/k5HqhpowzQ2TpFuU8f

+1IjF+N/4iPvDKOKcJAOzlRLfdRQVTp3I1Or7L4clfsi1e2OMNSe6eKdgysui/fPJgjxm8GxU1+a61Cx

5lF6PuMmoVYKTQ/elaUTRypTQ3dtwXLGMFoArso18l
3rl9YYBjBHG6vmXvPsAza707rmB6VSxOmQISIuDXIt+aQ3FsAosPaLTX3+XMG6btUCvbp7yzf7oou+7G

WEZMYaGk1T2OfQ/8uyrAg61/rgBj8rrx+2xCf7OTsE9y5orB6XQjW39Pp+eRHkOy1fVcWNbVuSDXwqJJ

f41kVj/uteBZyuz+mi3mnxvnL47sRYJ3pTBy/PTMYc
EqfqZVOUNxYIqHMMbTRy9nsxjHH/mESeCjNqB6WSJwRdKuepa3Mv7G5TE43Qq2W43cRSze1iFleXE4h8

wsNNeG+ZCRLzr3qCMaANaYg7rcHSBTr9sAZzWJReNU92SfOc3fPjC8wLnWMA/Hhapcv2ZtEY6h9R4hN+

UKr+prGdteH0N0F/ALeQTsE/xGC6en/wdE/A5uOP0x
ZpB9IjWy4MusWf8uHwRhA7o/wh9GXT6FLLXweUR9M+RUVcZzy1XoMZQtjNKduvQFkt5nFYbkUm8wS6FF

OncyvWm0shUcydR+X4/q8UHHvNGz+H/18fdlNyRd1HK/o6+Z/NkULGD5WaQpGbTdCsZlZVm/eSG7e5CW

AVDSWvBfzsS8099Kw9hv8x0fJEiYCnua9n0/bTEWmC
FQg35mlyb7GUDmZd6El/R0aG1cpeYOJ1Ra9dVt9poC06lkOFYNkHCuxq7Gpb4Dt4bYsefBRkOksRIrrR

Id6TI7jnw1qZbXbx3Guxmm7vcUwKhh55ll9QtCFfdS2c2ND1Bk4WeXmMSjVKChdJFFSgfvz/ANairmVS

bX3E9NzgQ3f4j95WJ/jeXedm4YzKj/WijTK/ds3qDD
9fFT/Kl0ZlJK5vv157f1JMk6ZDVjiCmWJbazr5WyVWJigPVLU+3AWC+Kk8zAkfeB/t6z9exXs70J5lNL

5NdhtNCKdFcGvUQZgVXsBDxG1t2p3PAtpD+T3557zzZWV18GqtGmGwyE57eiILGsd9+XNSMf6/oohf8K

CzR1WtgzQ/Bgs6SCCSueWarxEd6/0RlMr1ANomW24M
JZouxKN0dteBCw6zxiku0z20xnfGSbwW5I4Dnd502DU9tn+wBpQ54MbaiLzXA+EDJkHxsLruHx9T6rcM

+GEAX2Y97e4prgU0qJMn1iY0Oe6FUD9Q2DqBIzPsHx5ZWY0dnKJfrwACLN+Ug6FwYM3FE+UpsQhHYiCa

WhRPsLTPmzW6MrTr/YaLhKUkDeHg9WVKkxtOGOtKaJ
9jW+8GC1Rjpz5MHSdE05egn7LV5qzur+5OWotZT/8i3vHtrp3v9qXPMV99lcXciJV3NXWGhq0eTkcNz1

jyaXEUWNt3mEvPtanbGyBzt9zGoWWUx0Atq9tFODbII4hBDCoaDUIA9qKy7V/9T88ujennJIutN3BQD3

O7HHZw3Lxa5y3ZVGXhRQR0YWsL6ReIbg5sBjjvHmBp
wpYTkVZrU5fK0xABKPHiHrUGl6ywDvo+OqLPha+ttv78sejAahK27jC7yDAk38a/tc8eFrvAo0Zm2uHP

ocdhqQsaNdIpT61jDm7SAXcyBbv8gCquLg35KzTQTb9e3V2cojFd3MhHN3MPLPw12jrKvKy9ODgICVM4

z0ViFNt7Nn4Dr8MqWpWMT6vfLCef06cUoMvWpOCvnJ
0dDvtsJ60x3fAHqnM3NPWQfZh7/nEqk0MCYvYNP7hZ+JBmBKCiG1IE/7sHfO/DZJ3WxjKP73oI/wxBMK

rHtP5J/iEA1SzX7ZhnuFKqBc9MhGMKfRaFcMZY/iq7qh5Kp9dAGtDFrpI1+3ldOhdryeZCnNx4B+dF4b

Kmz2KbWVEh9RqgsjXhjWGI0vH5CLf2s96u4ho6fSOy
bxsghKccQq/f5poBJHcciM4aCmZadDROyZDFGNBQfTdWy3Hjb7E6ypR0NIUh+wBhWIsldZujNSD8ccKS

scBquQ6z7L6w307GVhYlKA0aclrdL+SmGuOLpbDD77q9jMRFZV4fKF9TJa27VDrQrsY6VDIWl1qTqq6H

J5h3Adf7mrpUy9Ub/U7cOYjgMc+CWGZGhjRnMFxfHR
sTzarnZjpB9mYx9w5992k2/62O39S0XE9v0hOUVw1tqJ8iDRqbSDYNbNBjTTZNVUdhIUzP6kTq/Ck0zv

42k7y8UqnTrR60lgcoTL7iRj5n332Y7uO12Tw1ScjWv8TYxjuG/33JFABoBkbABlxihg3jdOQiEBbplN

MiGE6HyAszIHqclBQqzz4hlEbR0/5QBGjRmp+ai9wD
bmj/COs0TxLlgM3wsUgfJWpa+LC0kt4IhU7Oa61X8O6yufmFGbKyfawJ4b0tor8/8FtL+8xRc3+sVHB/

VV40jqOoLZdv0ItbB4TcNpsbIH10+Ctbb6erbK88d7dJeX+XuI4uSoACAvVsflmv9RFS8jWKTg63LQAI

SHLkuAVAvbGe5/JhZrw/fiDSDVu+ja1682WbSr4Zl6
CvQmbTE21Y/mPAN8XgYkS/e+4IuhFXnK/pmKJ2Ogn1SrG0pTK87z2PAhFl4F62h7JRrSqdgF43rGe00U

JvIaUYVW1uR/dQF+khfA78QoByklSiAu2JuDEIF2wUphZZ/BA7H0biaegQs3V0UuwfUWhhN6azpmQtXg

bqLxpquwA4M8hXEcPxWKVOiZJ4bLw9XrF+Z7Hg4agg
/78omNa+yvSaxmbWXRughymeT0kFESA08zskCjXj/u7i1JSOBeyLxLtV403poY6tINXw7STWvcjWFlkY

2ITQ5N8if3ixTlP9Be3cadcM9574Xg2z3ysaCVsC6QFcHS0cUwWfZVxfDq5oJVOD40R30GNAcppmeIe5

0OThDZJHTiJHNKuMMbqDPH+FHvWRAJ+kEJ2pMLbuUT
DWgrgwmRAaf13g7jlKbtex17iPNUIZTDXQ1bHJqgdgkyUvVY/gqAH9cjiatcTrd1sp2uP/j5d6Ob2gLG

3m2E3u/rokfeUy6jHTfb2Mq0MJmWWxZJyxP9LH9Bz0UpXaKI8qM5/y0n/b49zAahVH9W9X+LWDGNVNDW

ZQTlAinI5ICB5EMoZ4AuoVcBENHKjLd1DDpgoXfueQ
kNkQKo7zWtqJqzl8CX2YLnVx17MIjnV+Hv6mdwq6LrO4kb8MudVbtWF3q8jLKPvVJVOoNEluXxXnGKjy

/GkAWHhZMbpIkbgCS/gKTmv5pCNkxWfMuJ6F2QWTno1B3KnRxHS2siI9efSq/6NR+cZ+LaWJkjdch8Ga

+J9XGIOmNpols7bvKbijyYBzlTYvGUlk4ND3GA2c9e
oDFV7CObhMTbQN4QaR+exPC/4C6M/fK18WQ/V5wd2syg2it4vqdBXw4ADRPr/y2i2qFK+Wwt/52hDwYg

LRv/rC/dC1EV5GTxryD7E3cdBPJqMVT8nYCCnZzIwN4JO6adkzCxLMMNESkv4YLz+e+42Ih8OJBcZOOO

CPV0Qs4vLdq3cldftmGNLMvUi+maSMss4GMIzGr03m
NaMOhBrFLTuU16pvur/FMtWeEDnWq0zQxxqcXVp/huKa1vVLefhwalUBIrwCtJmM5bLvz6H5IkuvsM44

kghthGAridDF4yTNB/Y0tcv/85AN89+/VDMdxcxgt70gTGiuk3AhMUzyU6bxFa3lkoBE9y4mQ9aGHMjA

QPtvy8P6TxscDC86ngs3hoGi0kEW3fmyAWuXEdMl74
RYPTKBt8KZX2p4ki4Br1wMdJKZ7WyCZjTzAdNcjrTU6n16C1i4PPU6HNIb49mRDKfEqQ4dm681kzzuDn

xlpcooCb9R5HiHXFd4gpvjEhM3lz8G5/cVLBG5kaRG2dDrjkn3rD0ld05X+oUbV9BAnFwNVm2mun0q7Z

Ot+wJLdaCqowlFKgAN641RA5cpMKJ+JqAA763lRtfQ
v7a11Nmf0V0//mOxeqSE4R4/Ytzs7cmUU6av/uS+A0dkD1f4vRRPmiOQQ0xQzll/5S9vdKhHUs+FqUJI

GgaOsSnjfo7LVci7W2ZZFZZrMQ4rg+RJSWd01fBHAT9uxKmeTuV6SuVM16o4xIbvjOYqKrC6HaBXKTUL

S7xb6aqMfiDcwGnCD6Ua69rd42J31K47D9cZyNj+Vb
N5tND72A0c8qEInNhWfMAebLZdJHUMFtYKt6K3Xcs1kT6D0UWyT2z/Tl7+5eXnitVpWFxlSKk7lt01tc

N2eqTZ2JadE4NU2dIiV+7W37Kyn7hpdrPMYk8520aiK04Eas6Lozdwdb7xfOeK5e3qKZxRb7L7GNJ4qD

tY25fy1rN3MsbJ+XSVY3wUrglo+gIRKnvEnPEiC4cw
YXdO65fA+keN395tm9Tx4e2Wd6bGXP3RtpP+EFXCsx/3ALt8+nmaLGGwYFIh8u8k9N6dH6Gb/BPj5iCp

gKa7M8iW6ejLAX185FixI4fuHu4y28rMpaBq2NmXcVts4EgIbftBvdBi42f4JCx2TBOgREfIYz+Od8jB

Se6a1cJj2cA5IDtu7UdL+HqcVbTobgUBgDVcpQKiL4
CDHsziQc2ee3xyD9Zvto3TtNe8dwHjSOpDcnjB/DxtiZL5glHY4Ip4lrW7SyDJtl1C8SI5UeQa9oLkJK

I24K8q5ci4mjji+MOKiIaJKeCfOinbGtje9mcz/g7hU7yjxe9/M95aSWeV16sW/WCMdJOu379OWFsZHT

06+mIytAZc5OCuEAGiB9QhdO+Ke7auXdaIomA0Foon
tm2ybEhEmPHP8+dKqBKFt1OPnFflauFxNHsB8J/2ug/cS1bCDPeQhvKvCchQMa5jWq7rTQvDCkdYBnvB

HBq6dmthMzn82thS3Kni9ktXWX1zjbZCZlwZ6+9L7IYu5LL+Y/tmZhPzaztn0TerjfOH3k1i8d/+kULf

O2LWMiLcxmEjqutevSBe3/9Zh39f+AbwdqGPSj2n6y
yMvXgARffdXGl8wxq1jhTrZfuuXQlE7lFlADraF2sx6SfJE8017jRr/+F5cMw3fBR+bNnVRqpz8ePY+D

7QH60Nvt4yQ8bjm7BXauBkWAmGF4EPVV26QgNdYHpF0iri9pWcqz4G/NkaoD8K9yvaAqkjknuINp0J2o

a085w10E0Yiy4fB3BdxfJA/8UqTqki/jtDsyz2POAn
gGKxmOHIt9r5Nr9s2CoIPeDoqpfIoNx8q+EdduFyrnvVTISTAwpeXfi2cqLksi74HUW/zfa7NCKeIOF0

eK39ZvORPkhnR92lzx8hiT3Nq0ITnzIIgWpdqESoO1lPHzsMcfHV9EZLtuOeR1gSeztFU3rv0AcJvZ1o

CwgR3WrET+BWj9vd5yAxDqwJQjAnwDePA6XvG02Ydl
GQiIK2j+aRiDJNG56MOiOE5KB6WcBVl3+15PSQx99+v7H7/fxM/EY3ghd1/rTvkOsoDJGAkC10rCzDhT

HHaFTtRclMkZYLtauxsWR+C02w/o8baieEm+B5s63pXJ2c5K6obpf0il/TMrZLFpumv7wtrm+V53yMjo

W/5kzbY0DPtP2blRGtFR/pok/eeH04eTNwq1Flmf8Q
RExj1HLeeJNujUxXXaLWEFRq+ObZ5lRqpci0Ajt37b0np7H4hQOgdmTTwqJp5ra/ydORKWQo8gPgGbA3

CysiE54uordL8ltnOuqct89w8PQ//+tKIg8br3vcuLyg37H5h+P1l5aIPNLq3CnD+eJ01Yf6wKucbI0d

B6bSv98gSNg5rQmMYU9mJu9AYVoU0rxdl7QI9e7eNE
BNhynuKB6Kn5o6Qxv0HGIaAZLiky/rC3PMsPfQdwoa9F4Nsa9c7unvbrQePsR1eftGAqahRnXnWe2il6

1G/tBaKiM92a5H1RXwZmn+itnscd5J9WP8Xa3ncleNOcAlbrquks5iyDL+3s39ijw+xWBNx2ZBDag3+q

oWLGs/COAlwihrznmua1ehTWigMsusZ89l22FqQouM
SOwz0O+qfohNZIoYkeTiaoVfcz+GxDG6CbZPHxLfEs9XY0MUND3RDOxzQH4Fh9kTcQ5bq4FaVGISygaj

sUkSn4+vRzYQZpZD1ONcvwAc/+7iI/w8nrSgpD41k6KJCUhEc6AdiEhAm2eC1rb/U6ereFlOlkfY7njo

fL8v06tDQhNGSvMWiPK/t+aMFhZ+S7T/1vqIcFeiOn
gKZPAv8o1rT1TC7XUkDot1RkUc/qdlXhDMs+t9NH9qRZR6v1xUMIVl40EZ6Hb7GJq+6//ZXyf+//j/bu

EOSzjsaOJThEOSTAdyiSsZyu7UbVrHZX+qoaVsVn1BsRILPpnIQMQV7u6oLyIHjq0HuMgnINKCiqynjZ

c++7PGtPUltbJlW6+8h6fXwDz+QMr/s7TwVI5o8keY
L78q1jGTvmy8/Jxt1Y0VYLtOVRdQIWdiZVM1DeFO5rfbQdeUCGXUu0lWEA7weeYkDYHFtP4VMpjydr/r

P2S1VHlMJMzwcf3jb/TnRfDc5PqQf4HopGblNm2zf82v4ajEQb2oIaoOjROoRWsJKOZ6ZeJ8B1OGrv4r

pz8c9gvfi6K/eP0NbaI2J6NCfWl7lK9r/JZRlnJ+Du
foTHfkN18DTcrp07vd58XR0xsWw33WaLDOHmmRvKarayKQrIqSYuOmBLNJOYv+EMpwks5kQdhGHOFNhC

h/2TD8UdLSSayOO8mtaEAYVD0GJhXxJT34Q/RjJ8JpqlpwlpZWwbMARBelCw1Ot7tB3UGOM3eUGNO+oc

83HIsI1unRqo699pck0bsNNk+mbI3wDYcU4FQBqVcS
/535SSBEAnhQ9+fIWyuxNVvuhmhFv32V8bQ3J0wMfoa8XSn5z2aazVKkH4G7w6lytpGkc8V8K4NYjP+R

ay4fVTu7ZJxW0mfTdEd/xGG7jsgO1qyiYKCfUnQmCE48eRqcsie6Gqavx8L9h7O7H0Uk7hauVKqzuQYR

cSGWBElXVCP5CT9wFIxjLaE2YnVsw67PI59+htKqmi
T86q/qaIwYM+8PTlu6YCU+OEokQg96ok/MeYol1grNylxb4rw/aOugGD8IXY6o/0N+SAy2GNuluPf5t2

0ILSa1Uf9MW+Q5ZcCH4LtVsmlGUOnhTjyaYnrps3cpzKBVwdowSgOeoeyEcoHzjd5f7Xlz+rqq7krjwt

Ot3HWhMuxsxaH43Z+JtnMb8/I9lFmnzxXIoj8oIPnf
eSsivutNMhrtFbQoHIQLjE9QLTRJPpGbsMcnTMa52zqD2siGTahCDDF2kdNpNUg4jAZXU91RcJSE8GkR

hlfFc0Nsxr5o0N+lGkLJ01lWt1bkOu77VE7XCzExMm+U9AApo+IFi+ImiaPoswkrs7iqmryc0O6UGPMP

tug/Lpbhf4anWxdtebDi4Ml6LNTMF/ImWTuIfCzYxz
0KXxs5d0u5bApL8Q9Hbu//Qei4ah5yzK16lom7KWO4yrqtSOcW/+WepBsrqsttFlq15puDvzGwN+11OD

l25V1+o5ucfrAEJeaUJzuZRm7dP+YsTfYetWfPagoVxUp0eoKizu0ZEk1L7jNIbK6i5AfsT3cFmLmN7O

BOx536GKWf99QD8sV5lFUexqXwxL/q88AhMVFXqSip
LJY6jEvkQVghH8TWSGWqCn4Vja/Tjho+fEnnvB4FPvxnAzuTVYZicES9qAHu03IeYqtV7GwoSikfnEye

t3O+AdzL7EVVcTSbxxcfWid9tplVu2P5RVmiZSbOW621IQftkm79Ba1wzCRCKDFQ8D5pQXKHjKjoUCtd

kWY70zz3gDF9J/TchC3+z5m3K4bVSS4hAqi542/hHk
U6QNg4f3JdsGCYnKRxJDX2EA58UJTuSkEnX2qzEpgP4M4fiZVZuoZmD2pNMMm0SqW3INtWl78Nbe9zGy

9ICPyb/IC7y4pe8oXfncgkvCK3BSdmBeMETla5zrPfxhiek31p9HhCfw9jqGwFS7KnihOtFCtGHNKwZJ

kacbUmH3/8jzVMiHTTZTf41VBJjvgs0eK0P/E0DS4i
/pVvtYWsV1+vixrMZpRUBTIipB2eAD9FBy/mvhWd6aTPADN0jmRxYGYC/jz3qYT7hU73dDhh/JsZqp/5

gElipoABSukzgIFZr02DWXjnk7ClMfQhlkSPV4xY1YKm+6jOpyXvpySdEppjrq8C7wCz/VxEl78DG9sS

sAuwChpd5S/Mmios/lJySfh9A+7WaoFXUghNm14MGW
OoPx02OoOLj6rV6qM76m5XWDMfMri1BPobTs5UhQgAgGjyd4sX3uSt+kxcXnpCsmsD1AT1yFbdHllVBh

0rgRbd5fhLFjEet9BmAzDboVLBNIoUYDzAPAoShwmgUMzPxV518pXcrPK1kCT7JMg4yJI9FHY322aUNB

T3NMWiRY/3qNJeXIkXf+i7xVL3lZYoNuOO+z5iu08H
sqjHp+Dv2Eh48NrYIAqjCUkkNkek5ME/mUWyQguQQK9nJGdA3ekt0bihKuAayuXy93RoKtM9G3tYGsu9

ARWEDDyzsPSdlCMdETnmTgc93sIp77YC8yCS37YBxXG4X8XJMB32l8hX1jY7LFuh4wm1DcM3SJBdqf2G

z9atoRJ4j9GFC6zR/jj9R/3k+Zu/Eie7LVKQ9oB8//
VqyeMANF2OSHfOkox/l2YZG4/VwunRi551bUqNWnt1aVjIvMUTPG05SSgVAxAjegkEOU6AL0jXkmRi+1

OI3/dCIVC8UY9RnlblZjA5Pq0tJJZuIqKwD8Gbd0ieYMzxz+sddR/XQADl0zEEURbAYxWkwKTsrQCcUR

1gaEeJJmL1pUWUrO3hQRGS5Azwx97KAeMHSJsaN4b2
4oevmam8TOjoUKRQ5JQjp4uREErYllB//4F3omvhC8ff0OEIUYdmSSJqXUNvtlpyzKJfr/SPHwVLPynK

0Sh9QkajKn03R4s0pGPT/PgRKj7BnAkvunZ8hcu7kkoRJSx/35+Wt3XoioPu92NcqAqeAPu8mtD9eeyw

cwoB2mmLADGm7Jyr3jwWOJZ9xZ1jp3jdKkY+3/E5zn
76Ktv0+CxJIO29VZJjq7UE1zeBXrkM+0NCgrtGXWrCUlu+v1Jvp7itkNvpetihLIe+GHrfeU5SizN9Li

XfxTnO8TjKYAt1BCUe4Q4JPdVbnWugXouaGQgyoBW9hV5O74ri+Oafno314QvBYwZMwlxp3Ov9QIB/+o

8NwBX5nv2jZ7/OwLTjp2NpK1q9W+idQN/6BsdMmtK/
IBi20W0x3MxhXYtZMra8P+/lImb6A+lmPMMSxmhd+TzOn6vP6CFY1JYdWS1sujsYbX0A/x//i1YtEOC8

997HZFaZoYw0tm4T1Ksrw+BaOTaQoAuRikHxcM0LY2MzY0WnNykdA52uF82enW01S4UGEUdDpIes7kjS

NQCpACZfYeE+VkFAkTppZtuVR99fIYtLByk4aeV//0
zjCdbQvyFHj7C/jbpQYj70aKt4hLl2soU++HSyi4U6lscoqRZr6wuWbVPJJepfEtcFF5NEh9xR9HVTKQ

awNKsyM1V9cZzeyLgtinInll1xWgshmc6C6z8q4eODOWKsDgzrDZec2TYtnAAOC0CXbyk8q3rmCzyA+9

GwHENAQvpDO6ZZq0pFeUIBwMdgeCn4GLNTmHqz4r6Z
dGcONNCjdTO84s67hI1E4Ssw8QqGhJfTPTGT7QbHBUYrNJz9Rf8Frlrjg1eZmiy4fGFhsI8bpCFybra/

LKPaj4n289zZRFmM7a4klxi0nUHm2+8r4OXoPTB7LdMMksCrnsLzU4lvyt5Xn5tc4p4cdlXZ3amcAJ0E

1OTAFBV4tgAf7CDpIOEn0E6mI70IcCKsBL01bcSJTC
KwBzB9CVXrThazYd/PHHsT2yvfz3sdg/ntBqOPb65EhtwBh8eC7NGwkdyPoIeM+9ONl+pO7FC3GR0/V/

hYY/WA0Ov0N1X1nQCxPsPhLhxSXZNJ8j276lx9QTj8oTYkcwSDReQuuNbWIcvokzE6M1p+4bFdpi2R+A

KHSqUmmYI/hzHuPZku9v5IudbhCC2JlqJ49EoicKBI
t8k88mJPRcq5nSKwvjBKuuO7jslB10+IaYnKceDSygKuc3ij8jdoYjpIHgLA/qKw36joNGSUjs5lXH7E

3IEMrT0GQgPDgmbYSVdOw2+SQBUWqsD6aC3dQPcMeBuOfQVOoe3mM43QI1CD0E/PltYwAcpm/9Bfmira

C8so1hVu5qeRiID/oeLxqXHQhtlc4mkp06x7afMe65
A+Oxm/061HdC2mnyKDqaEqAc71rB8pxB6Si4fVoAnTgcUIi1Tclin8Qz6+ngUA1dXO/qGJkOobbL3ZpT

pkpJMmzAw3p7IA77f7q05h2KGEv22zHoVD2JHcPQ6gOkGqyFg8MRQFYsTQAlu0b4+uHDLh5QijCjUX0R

mkL882AuSFB+t2mwDvGZyUeM+9fCBQyYylU4qqgYrb
q6SkY548pqrfEPo/cpjVkwDXjEujcH1wt7BaRS4B+ujk+7+j927FGHJmbk3zFDwXbBXzahIxb0PcEST6

IJR2XEMRYNFasVFoGVu6LEtrjycULM8fsqlPKrNv35rig/b689F6T5nY+uiEZBY86/u36djWCb/Gfwlq

xpNu7fkD5DGnHtqE1DYUTKdJkqBpvfHNy0BGq42S0L
XJS2T0+s8s5EZVT8vHffbbwaR8HgSzN4HoTuJcx5jLpWkbu4P6UogLyabCrju6SclPVkO0Eh1uywsTf8

n2UWY+RcNYyvB6oZz5fQe92kT/66hy80UNK2ofoHp4Afrwe/82VOis5kQwnreZJXy7qWfkYa369BnP6t

aYjQpnGupLT/Dzpj813rTo2FjiNnrXr6RiK8ICLv2L
EiVGZvKsOeN9zJ9fyRKub5COjZ0ME+/lcKD4UjnRG9M8Fv9wknGhLpZk0k3nk/7jsn38irKf9so7hzQA

QDV8U4B+6hZn/OeOG/bxls+rps+6zbRT4KAl4t0odHWtr5dhF5WWsZgnl/mrLCJOPG24jiPvLll0Oad1

wGZixSw3qLGccFHLjPQpWxMFsplXgr6qpWJFvJ1bUw
mwEIucK+LvSEl5FLWh16ZSw83XiSpB9elIb3ghf4CW26R7UA5qCNeZcufub+1XbifG+zgmsXbs3k2F+U

MpRhC2i5/BYyvmrC+VroBsZYhc7a63JJxF1zfnT65B63o+FL7oFks5/rhPjre/DaBLijveT6YC5iRLpm

MLHuznLEx4l1Nc1P/dY2Y5LYe0bB/GjWmC5UOfa9De
QQ01hzQVmC0em4vuXrf83af2ful9RBWO7AFWSsPz5ADIG4MGtCsNzy2kcxr+g1sywNZDOrOvdRTRZWpG

4xK1I4u9k44jFYvA8UVz0oPy1uj5pdkg7pwQ2Xo33tzOT17SPQIeBS0bfdafm805yP/HQ4W5k53cGL3T

OFJlUIqr89uC7ubdCMIJJFlJpWz45e8LQWcwN+5Gcu
RsB7M38LY6ljFEIbuvhebmnjEw8hX1qP+Sj2qjC8AJ/kBW121zYfkWJWyjgqv//kDbMlTPv61KqqnBoO

vmHEjmgc+Derrick+ZAh4mqvIFnryR1JlFNzqDVeSVFlj75V+Nn/V4yKha4xPLx3anqMFIZOkZa9LssvfuUQ

DKXIIr11mBF6Twg16rkE1huV7g03UYmVN4+z7Us3hH
AHOW5+5yMsLsvFb6O4QmXhTQ6I3WTkAGN/ys5xfAvPk/+ox4eqiTV41hUEL6TPA4pc9+f+P/d0UWQHWI

DQrv8n19VE82RUA4Rp8Ka0nsyt2p7c+rVzbZuKofQmFK96KAcGZ2UQnd/zHwi9BvUBet1hBILd4QmzeM

TpTc071u4KLmYTXB4BduOlvalnWG1ChwTqEhxSH3zk
Jf9zagVzLDuhywvhn5csTW6EbeuIhihwrGBHNVHJIaBoroeRQzuWfQd5Pxra2tBK/HgJAMi0lFRRdtyy

8Hgktme1EuXBhFtdi7TIB5wK9OjkT41ZZpeKJzU+M4hh8NXyx4yIqBjJXVHvn2x69aRgHoMCMXAIoEb3

Cafi5rDacCVyZ10/Y3hn1QroL9z/dznuZ2VXbneNxp
W1BMF8trqZLbI8mwRwa9DZ35KpyzISuoHBj2pC3qDt3/wtXZmkUC1eSYoWHvNoRhCIS51hba7shsGg/O

188tyVhcJ0VscekoNqmdno46JHONNivM/amFFsqmAp5bGZND63jnoV9N8sBZPCR0XpniICs6WkFeX/0b

YLrtMrWCcQ4wcXL6mJwFBeZRsmHmGSEtz0WbpROo7E
kNcUj8lyczbKPh0m9sj91/h+0P+Z3jIEUdtJQpaNn4NSy0reHwi08V/5btGk+7HmqdVGrgj56mRiPjNo

WAlJRgrFf2cMyYWXuGefFPdapJSHdubcH0hx5BwdUtqugU66q9Yf1YzLvIgS2ekCXNEatXld4kDCF6gx

gg8ceHSNG9bTPZhapUTxCdzlS6E1K+0JeJvydeZGvw
z9UPcr2rtSRm2vuP1pBJMQ3aJmNAhxeIrsZPf9xSu3H2ARwub8K4qCv1st2nqubM7qBByxQh8PpaA/6H

y9HaFT5MKaL454AWPoGpb5dtbm49GlDsW2Yxwz78EecwKtFS766q3XbZqIOFq3DqxuzZnY0EKvnOkxjp

lOiXV65Ik1DVzy0S848ojlJ5wnL9nb+LrSaYD6DG+M
GRP/zWseJVdyVEV2/9gDOsbQh/TSEoe/7FVs8AL5kRm+ihfzkd5dlAYWkcj/LqAfDzqRn2iuzM5wm8Kq

aHzfm8RrQiEMb4uHZanHjVqYnve4jlAEPo5I4sxBZWE9Hf36/6sA4vTiik9Eugf98o9gVeU04XZQarfW

lktFq5Qt2Tjx07KjF3lUOuOIiyaxFw4CVw8D/eWvCB
Pqj+m2jXUgl4/s0AmhmV6QEymdasuJsjA41XmBd60G7Y5vxMScsxKPPKm0dLO7/N84V0LLnM/sni/KhY

F7buizhDcKm1Xqibtar926Su0HtcS72QKJ+J82pvszHevFGNnQhAhAMyHiFK7fIcRxuzvLnROzbh/S7K

9dfWIo2pZxHX0QqZhmK16yRAyj+otirD7vjg6PH2j/
3N9PqIbPznL/Q9gCu1/UmX2s3h54BRsWu58XBQaFclOMaC32Le/LDjuIj/+P8xj3wfXOSJvNF5Cimgee

KSCvWVk2XWU3ZUtA7bqO6ZcJa8+CoIVKjkz6pW6N/nn077TCgbOmizBiUq3FUDqWfQ4j/b0WSAPHYR/w

b0+Dg0Bx6xrI9BnvibDVhwrCdCF4r0IkxhL8Zak6BS
pj2FEGq/WK8TuhedYVt+2+1ndacJQFtzYPaP19y7GvbziyS2fOlHpWDd6JXcPEeiM6/T1RCHciK2XAnM

qdclabgMoUQUia01BdaJ+xMeNBeR6rOFK3iNDx0oktQHkGUce2jJa9VuhbcXI1wy5wn6Dti+5b3Wf3h+

5RpDK8IrJMWE4iHeMhMwVmKb8uw+mUshlyPz4MHL/C
pzWWjknIMnUd1ElM/x8Ft0Sn+2RwO2GdaI1Flju4aPr9+G05xfvuWuagnjvmLLy6NJgrzEmbDnxPc8G5

H/oBfe/43/D30DB/xKIXbEcW3LdvI4iwNsRifNaFqmkzyPxSa8hR1R2QFfVyRbNQoZxRdEV6JM7eoLAY

L3qKVfi6jbv3yyummkon8R0zTms6Fg25MHjAvdJCcz
PMPCaWDp0fhUw+x6AH1mLXc3VJQmLfVVtka3ocIiEAPIryrVET79Fw/G+NIOM08DfjAWPNlas6+z/yzM

oPhiy7RZMQk+f6IuFmvhq8S+9wRMDwtaJ8fq4uOnj1aF5msSqzYgJXnBBJiOk4MvVKmry0Yt6i71+Vd+

pgi564oxBp8IRKpb8/5LZmCpsBswMJWbApHQ8BiAgU
wh6LGkaeGqA4ttooQn3XFsS9euC/hgIgb7eShD+EdeHH6DWdkYqbTtGXMeOapBifRK/XDHi5C5rsEC75

e/JbI2VUgqpCuWwb7FEFKJVwG/6dMbwnLqBgsuV0msGNTspJBrUK3T1YpZUHLOs0uY7siAQ3UavuyEog

Y3JAK4iSLdqwfa9iaSjGWeL5XOIHP6CZRaF+H5iFv+
s6AHwfZHyDfC/btudBN9XzQRAgGEuqC+Z8EJjSJKbU68/QU9BMnPrE/O/rV6PLuEubVSoLahJD2WRlXU

aP/H6BD0eNM9DToIsNyrYfuIWmaU/tjfVBQCl2yCVEesgoVS4bMz4yi9huxEp/bwX2pfs7+fy+yF0Xkt

f+rzgggbDHOUl5Sr11EwCQzCHnzH1nfXhkAVOFvB4S
0vdh75IY49linQDGvCUkFE08yGi4FndNTzyPHgHjYrW9lV/xXrzc55f4gbkvzVL1m9jxo8MMNpkGWJNm

NEQv+sYDSl2B2lpXU5mb08gp2LSIbittrH6SeCEtsyzlvmkW62CmEAwFCUmyAhjHkuaIRU231BX/VfM4

AmWN1YFTQeltwrRYJiNY6drKVNsTSRY1ISw9BflASm
q//MUsQNE/Qz4JGpxq/ZzzUa2UdolH9bRDCCimcuHGe0VMvXXXzf8Y5nJjRkHqJT0n6UgQyKLrkt+FUM

pF+MHcqkc/Adao63S8Mrcq6BZF/DEwElHWmdx94LHT3zgO3v47v+H6N6jxONiaxC/QlwzRmJPxuHp3kO

zGa9tOW86ol4FoDNpOD5NZ6tLFNC1Z+4FCpl2IzZct
wwxxuFOunrzUY+Nfbnfe8Ap+DADfUUx+JjF5E5aJSs9mg7+bXQxMPkLO365CbCUZfkz9dToVlZDwlC3f

V5+Z2z0TX7VzQH11Aok/LIc/eKtHTuouuT5bibTN3izdp3Lz2GYYgVqGABiSnUd/wjeB7xrmTOxoCeVI

d54ZjUBgHmGDZelKDOMjvvpJuSdNvi7d9IgYrUzxB3
LMfLDONsvx1C1Mr9X7zQCEkxKFlX2dp4J4GRVZECj7s+83T8gnQB2vjBtGfWTpAFR6P8DXfwoOhqwwxy

pjs8sELY6l1UtNOj440DPoUvSg9JEzn0F7Lq76Gk03w+Iw45ktnbaRZQYS1KRhhNkxAQXT1mNvmrkHIZ

6Cf8jE/QeccrdRuLqTaTfVnGdXhWcVpCm2D3L2Vd5X
7bj5XQYjSKVFe9NaFoNrbO9YEeKM2zy2UFgs8XJbVjy+aIiTAN19nPMCUaltrm1aXGO14H/Z5fxmj9nB

R47QnpD6t9Houc92/wpA/glF9uxASnz0AItikdP17IZES+EIrjfQNW/7b2hcBIUpU0mjuI2iTEDSyuX5

GVA8pCur+GhaPQsz//1Mj7jTbCn1j5KA9/R8tcQF1G
RkNQtC7NM7Xx2HzBqERl0NbatheGECxWiRo+s/dvTyny0tv2PWvHF0bd8aKVGY1TLwNxGFqyJJg+JvdH

s7u6VgnjCeO0pmOixOUWXxadbttIRmCNHmS6kQTXxJzEU/CdfzlKnfJTvOHEvqe+MGZWUdh2mo/pYwcJ

wxdzgrXp9ZCi+jWimMS2hJ/BBOUOZSCIT6X4at6J+F
W/Wmd5cCKvSfFxOQKmJAvDupG3ztoiwOMLzY9p/7CU4a6fz1869i4UEqmxhRec5Fz5PLQQG86tonWzrp

oIkKRpv55fEKhoW/U2gewanQ6M+FRW1dksospsLEYC+y13ly8nSsq2sB+8B+XBr6sDC4nTdIphOo1sw+

K6FhkiFHyEo/UZfjHXB4QL0DmVjJzOGIh6fbXij0EF
cp7aRaUpXDmGlbX0OkvKZVf1t1syTQuv4IR5aCpdagDPzt169UsKWZ9iCAZB1X2PWVlSuLeggo96gPEo

iLsDnfzkMd0qUBgah+uKe3BRFqp2+loLjL6m2HaYcJ25OhhHYBNjt21B5CaHSXdmqdiYD1cY8ssO7uyx

WoSsV+E1lTREzovJy/6wjkEdCYr+D2hdgX+Cy3j+ql
Th0X8Nl/vUoDp3rASxOJ8+PGUYVoyn4bdjfRVEw6E5PYCpBfmmNkXcMxFewWAD2BvxfwFm0y7DUiBUEL

8mvhoRvTnbIl++GE2i5N9A3RkWn00AwfbGwyK8QZu8g8eG6tAUygGX2OMhfKfS9E8lsVy2m2UO4oq/RC

Gh9p/N4Uvu/u9tRJDbg3yQBQ6TbAK+6LztaXyjudJ/
sFwCZMbaR6oXZndhnD5fgMclgGJd6gGPCn+G4kRvKVgTAgC5ciM/8/AvU/+fG3nZ6y02f2H56sAgWPcL

txAw6KC8ta3l5hNANKEShIAeS5l1IggHMU6wcV0p7bENShQ90iBlkhIG1TXdNKZkPv7m67l6nR/USi89

xI1t6N77An8UzgaX4EytZPClTtc75i98B/p5cyYXtd
J0EQLUR3r7EI8Cljhe66MjkJYnJ/Yo9JjKeAfAoZD4tlPMQUpn6WgSqhUrd0AKHqlDBH7U6Vl9qDB600

YsHUlWTnlQqOJCTUZS/E09TW43JCZx9gxcYXR170tVTVw5z5jtxoyWfCrC7T1C+2H4J0ws5ovR9j8Dtc

ijBJHewF7KWc8aW5LMAa4eCUwPohnQf7p2a8lD6dkk
n1ldDeajJblxGH8wUa0CWg64KPXloaAIabNl5hQKS++SF29P6vfvkSOt0gMpAgbkH+lGEDcQXWBc2FT7

x8R3Z6h0U8RRo+WvJfR30731aiTB/slhOxHXuda4rg/A9xGNkIvbcaX3tOjFZMb3vET34K2GxUTSYekv

qqZEfUArxYsmC5lhPuKo5ff9PhPb1IJ1qfMC/G1v/C
UTIM1rVt0BnfJ+F7GJQHcWAC0VIpQTgRAQQb7F+85XMhwvRZfvmbxDl+jfuDhGgoS1usysmLagEwDIj9

QWaiZ0YSrVzETz01LHVd3BhnL29sgZh8KiUx9er/DCFKF9uxdQlHvR8UFH67OCHNDiTrF/VRyLu31q/Y

OvxSsXeHaIfpBG5Q9Lf14r+BJFdMZ65qne01uSWz21
eYSUvvqXS/h5b1M3gbBQSuJudNdFSfvicI/wIOfm7L806nblfPmz6peYrCr2Hcdik2qJiTJ67HA/YMmb

Ixmenh0eVh44QaFz3n8so5AKkEg2AiksHdGzY4E6zUIf0vIXU6lcOOzpbyp7gnHnxCcsJMg3FCgz/fDE

yUUnaIYUKbLWtxhx951F+tHD9kA4l1YA2MSF+f1dvS
Rl34E3FGd1i17bdTsNzGSI3607zJ9SqRrjJ2HOigrGhBzuw1Epyy834WvqYL3itb2xRcin6GvjWtlxVS

s0fef4t4h9rsCiiLHLSwfiHihQHOU4h/dKLgq4RcfIheCBf0U/ALNDJ+NqvOvrhdvsBK9HmOnqQ1fCQD

qI3aryDTNQk+3cbJXTtQxbJGl+j6fD6Kle2UyF7EDp
1nAytzlfxBW311FjLWNoXvvW6Pro+EhHCIKPaAwFD/f76uEqMZqpxEMCzQIJsaRDgJx6cCxrf8wkWCZv

p6B0DHy1b+U3s+Yi787V5TMz2rwdnKlreSDJMeLIwYVKNoYPqda/oAlIN4brkEOaCKbLAZRn2UM5TFGG

fQlLZq0jfV3xXVhLwKbsljs/qL+quaMZf7LSqX62YI
uxryaC3I1af2KLpAfefe4ibilXAUF2yeb8h5VdrHavDqh+vgNJNMthsafyvQcebh2lo6qI9ZJWbQhbgd

RnBXMKuq6yK+9K+aEYUWMaS1wCL8x+607ugtXE2JS++EU8wcV8MSRf684ZIuZwQCCIhKX7hYz4CSIWYF

tCMXjanldf+wYqGEgw6IPCbQMvdF4/GC2k8G2fxvC8
WEXNc1fD5DyXqfun6r7P63SZP3x9rZESCkDh57srep1VKX7ehepYm1z53w3X4L9zebkLmgMjtosmh63R

5op9rb/fCK3lU7HP3U6lgmoYfUxsZa+886HFumZOelWDa6OTM3KWAxhIqcE397M0d4FBO6CzZCDt8ryf

DL/xuWXUh6KJX9qiJUMyVl6OG9QfOrXW77WHvBJhRT
4x1wZYZ71ifyCJ+HOSuIKyWlexYz7Mn2xTsAMR+QburvLzoR32VDVRp5nMnBiNw8EHQ6kphnWO1QfCZI

u4yhdIMvx07d3jZaaSurWQ2eOZ2OD5jsKPn6k7BfJOLl9lVC/qlU1Gud7f8ntl4gBYBYqrEIx5++Nx0D

szLw+2s5iLCwgodXVkKrtRjLq4FkKTN8u6aoEX0ZMN
2emqwRoSYIr0OKz+I1gTo121oHXg3sPaRhlTs/9moDwz3ahQVRIxEyurmUXfZ0XYHop8qO3ziBLVb5+w

mu1wufMp5VNFuY8m4ASNrEpjgJs0X8bbS61F3yooY/5GeYN2gfrHK7SNxVgo37uBk/ZYmqtRqYy3wULb

yrRqku0pL/u7BlssqpS3x6p7hbLxdMT6+iRhzkXsQO
qTWe962upms/tQi3j1zp0Xsud2lzb6qk4l6DaAU/dcmBwmnqU8JVlPKj/rxqpqhovLn/WiqQapeNVDUg

BUuiqUFbye5TQ8ddUb0yZgg8SE5P7CKptLht4cZQIsgTdwFKqesF/wOjjIVfYgzm19JhcK7mFFT/2l2S

S1hbmM0DgVXdekD8kJGO87Z2I1fx170+VkDQZX1rWg
R0JveJ/syBpEeeS308DDKqjtLxgvNTAJMQSyJRCixp5do4VMkKOMKiU4Sgfi6W5X76dVmBL/WTiIcHyv

/oZF/cCzR16zXiViP3gqp6WGj3GYOW0ozkoykGCiksChrbXgkp9LHcJERxF80BX9n58+eCS8nb7XGw+j

W9z2vd248Y/SFLcGFyjqwx4h0eMTfPZA+uDmO/0s+W
CHmX3QPsmdOgPjVdgeGXgRuosOXLMa7i2DM7eAHbfurSRDXLyMeZgIwsKNlpp+uIOoDKf4PA66qwiXlj

uLU2j5XvWdd6t+bUQo93tvk5V58MMFPG7ASq+ouhwLEiPbEO4NyQcv+oMZLbs9GkKb1BwpURUyGfkL03

KOLQl+K6QiyGK5oCjEF+WvcvD4oej2nPI/QIHDhb+j
pjvBJuITXWRCJzJO3lTPfvhsBIJj0KU7MpdAmOx7Rp/VFfGeP6V3+K6/v1Y5PZFpxr6vsWhT57O2Mr1c

qTHfGipNDgQ2gR6k+NIjF+sz82w3cSdyVVVp36P+0frWto1FU77m9fZ6FfOeVmYOScYRjTY4yBEOqLzt

MaCcMGReZogkY9leJ4Ko+H4cwveSfUWoKjLlREvfZd
LHd5Csi734BXdN3cEivyHquFAIBeWW8UPNt1zU8Uk/KHbSlUrSmlGqJxSbx1CnMYs/GsGLzPO5TY0eAj

XTr7SmsWt++OvksJLhfOnNRxcIW/iwOXP7k1zRxRM4B43oV4Uaxa9RBmmzBBdLPQQmmZe9gNTqNGst90

z6D/ETaFMvaZroHY/3pp8ri394wKcSX6i0hCssjmE2
8/fcheqZTdgIP6QP+lx3DSwyMjcS0qI9fBtv6tje/MG97r3B/A/AHmfAYZuXb8hpN17keKVdC3BWMs6e

yQVRMRJxuOq3KgH/ergp3xTW3kClEeXiQCJ4pJv1wzJsVg+ABDljmeDAq561aM67sJ6jQEdc1sctbOej

oOmI9RUwd9aV7ttwrHXWGAgC2PsLHixCQCfeIbWO3e
q+OqY7dDh2irqZVziaYay4uJ0BxzJYnEHF2j+KOer1sGg2zyTV95A7R7zxhg6bYqx5aHci+rAQ/ddpWe

/a6eoYaPABpu4BKQe4GWOc4lEs94pZO58gYIfTEQArkpL0oQvKgavSn0s7d5C/1dvkEeKckcxfPOTqwj

Gah03JNYPPyHmafYZhPPolN/dxvEhKS2IVVS3bRpOf
GryUjV1MnpQDIlOqorfXg8m/jx6RbEsd6Jfa6/NpGLgXeEKccChOfGK4TSiLNCiuhZaz47yn0X0zaUKB

6cZmoYbIjwfgWlRG1DvjboscLVcFO/TirRmF6tWAprDclOBNLCwJs9f2MCxo7DBnhQjks12oTkYqiPEm

G4c/KpF9juv54KqDaKWm4iKnl/wlVtb0xNqW1O6G5w
E56XzwlIW1MYCIiqiMV8G6QCArg10ZODho704e/FH//Mq0cW5/pTAZZX8D4hCgWhqF9bvvSjCnAechLj

Kcu22JJTQNpFKGCXUwiWVYk9FJYlzUcJoiC96sRmR8VNN39/+5g0Q49gGSpRd/UZeoMrTCY/b00+lSjd

8xlevWYAuynCAadpb1ntJxl9E+1qTK8TeE2n5C4nP/
MSFFEA//XXFeVZ9AkMhgwsA28mDzQkGYro0c90eyCFZLovlLcLg7BbHtz+c6xGXRX0teJ6V8iucR/N7s

609AyDo3CLaMdEaL8zfotjf4epBZF7/IegUaprhlmbEbetup1vEspevft2FzShXYw9S2QGg4r0rbtWdy

d8KXaJOQIGcgJSNA8ESFiCfIaLyBA5snq6MCSLTA89
A9Llb9BGNpbG/j5GzSe73/m/nsYkAUKoIUHqQpnSJ7fHrdBOLglO9aS0YdvpUlcUG6/veX75DkEcNJDK

K9dumq4XyWX+QuD2ypcs+Pbzf0sCT5txYjGnt0Tx8Dfd4+4x13psFcKDrE9U3LTpXfgC2hqGW70QPiyr

Eqqk9Knf5tyH5e2DpDr5EW6Y7mcAayVJ3eCGUn5Wm6
yhRL4cbeAMuRYKDy0sOO8bocdzZ1CrBBrWfY5THrCLcLavb03HxHBlFAn0o3vHrB+OZfoGGqhzG2Gizo

psiqP/TdBh2jh9uxKKWdPDV17+gXgbe9EvgieH02usBJNpGjAxnrCcxTQLVR0dDSNb2Q3h6qYgS16S54

/NfbuODm0gKktDpWSE3EvoIGT3birTJRAJtbRk0RR8
cFtkPO2OL/0qwYAnWePaUyluUwHxJrsPvGLEaRfWARE5KgX3Byk28eriI6HjtAtG4owS36lZiiqfRx+v

wSzluc1AZaOvpzXvgNbWry9QIXwd8Y0viaS5b8fAMqVCDLJujwGDjuYXrp2IjyFxIEhxvzu3546UZ4nL

mlFSzbLtN5XIMPnLZ0x6LZFTiH3niY8h8m5jptsbEh
kBACIkl50C1T31lStzfMPVyA9l4oUSAVQ5LichcezHjjTfuxuyJBbS2Rer38mF73Ipd6fb9RnEvxeLbk

5VaZpTZ3id+7WYH3mJX/vbMkPo7ckIOaVqPpNnU/4LpIlA41yxaAPO1Unlc2tdme1hso0jC1MnrfPuIG

hw3eECNS89V47wKTj7B8JMTeg7SG2LONPctyZnLc8J
qn2f7vYh9j7Y0bNR/8REjaY2tKCq+GwC+4uhU47VZ9I32S7yop/f94YgIf+C6l+dSSbkHsUo/S9XK28m

OIrSHnmOc8IC4a2PYboWtsdRyFqnzD0K7pgI9R3r52FCE8MDRWRUitEZv5VLiF4lDZ2IutIGZYeg9ics

4UprtnEVjck1Lh+urb7MIAtL97+O1HmUegf5eEIjqI
N+k/zZLVtGonz+ZjFe8+WgU4IzA6/y3+wbtho3XfNvncoQcX+/RlOi+Gz6Flg5B2EiIsw3nSZrk+AnBC

8X6n1g9+tW/aJhJVxocPjLDCKA3VQnvHxRdfL8d1f43sq48q+cxwBVk3Y2V/K/DfG+aF3Hpe5iavp46F

Ir1C8CdQiCn+jKYk2UFIAXaL8q/c8jobizDYrQ35Lr
yWRAnL4WYr+pIdEoEX+bfdT6720qZI2uuQYMYUyeNV9NnSmsJ009sB5ltIb2noT6R1sZnPo+Ky+GTGZy

a6f2//0Jyfym6aZl6EJtvc9H40xy/9o4/7Yv/faSDLlSmPigfjoKmfDRH3shIH7nX+foetyWzQXUJa8f

rsov8Sh0CsBJHpQjAVaVl1OXkaIFhlrc0Jw0PC3hHi
xKGkJzgErmO4/GNxonmLcebketOpuMZzxWKxjv85ZgI7fABQFWRtzE20W8VvXC297rl7EOALFZSAAdJM

q9kltzJSu5hruHVKRmifzTRdHRVEwrX0iTjfcq8T9+FI/ywkoFaCRfIkzsZ8g9N2JT2r07hiyQvhoEKQ

gI4WgahFxAzldWUlUhY1rhviGb1YxVtpyq10ETp+no
dDZo861S+P8wSoIb+GLPqeFz8eR4LOY32HuYR1LhYe4z0wpCh8bx0Yo1YPQdSwwMf+7BHIvImKS8bZIf

uzZqC53tGhgKyeeP1Hvv8gDGTpIjQ/v+G42rP9+c4SA/jJDIOCMIId/QIn2Btiy0bK1YpN6f+MwCInJ0

qB4Enf6Qw8o4eb/OtUvrhCha8XlcfDGYEQVVEdLGeU
Q7S907jCS9zINaKdAurJ39z4qLYUE5TSxOxpo9gQeJ2wHBKbuyfmCucWz+t8SDyXRl96hnPd+/gXUwGm

exxNfwSxGPgLI7riZSBLrs5VQDnHQvowIK+ObYihbQW98WI+XV5QALa7HeHrnUUX/su57e0I//R4FQ4L

vcg0O9o2CC0GJnAyxKFM9p4jqaBTezkYd3NcWdikWk
Yrz5KOS8CUQcKfK5Igp9rXz1TybJ8SiQ9knvim3RzvxZth6nkdF8yWO/+K05/Gv1nPwlzljNtvGe0cgN

/LFZL2jWwJKQgxS7p/9GgJuoAryyEderuoI8f/Fr998dVhXdbwIcHHyDSgF91YeuBA/ybGNN8Yum0jTl

kCpRdhE05yGVAt55RQV2iAGogEN0jidAKW/GsvE2E8
phbKeODNRhLLJXckkOh04P0I+aZo6+Pakoj0e1jMII889l/vQRQmLQHuk52qeklkse3mCEgog0hsYxBF

yTDZ+hfqRlDTdk2CXg5qoHj/R30i2bl05FIRg8ECFCLWo8wf0UZ2W5NSgdn9JYFsWGObphjsRisp99oH

7Z08joZ1feI5kN6blk+Oi9GGBtl8ViCqh182nIjLWV
E7nzakm8MH8L68DcuvJ7JkWFk5voe9mhYr/UsZJ4flQwYWURP252hSSIQ5awQnJcPtnon5u+c3fr/d0I

IM6rw9Iysn99HO7onOisBV5yoIfZ+kOUqv8N+5bsX0XElXDRupyDL8HIsCWP/DjikhVm8VMK2D5iRKw3

GyIn7cJDXFcUZy0da42OAmO2w+kYGUM/P6L2VhEnRw
e58MSInUyDFv8Wc1xad6gGT2zrKKvMhpqQbRMFmdalAZF6wLWzIqOn1udi8YA6KxEONKknXB+FD9564L

m5U+OHMJuZG836a7UYGCxVxHcofSRVjQGblTqB6kjYtvjUMyt1bqSlH/6IH34S6+bjpHvom8ibq776BE

JtzMLTsbACmXc9eKFJ2Ogt8HCr9m0UJbEhbeZcMfAA
t/0yJXtEvajWShJonP9+TLyErF3/rEN0x2oW/iDhLHh+4AAs3bLW0LjkyFAM8319SV23kx0kKP2QdXM7

QasAzE4vdeWVhJroHiL8P31UV8uIgjJpNA2k60t/B2nwhgIUAEz75btKVXbcnMSVd58r4Q8Np/jjv1Bp

++sKVeQWMQ+fzg3UoUMEbLlrfqo7a9hS83f9sZNfFk
abvc0YC/zdGniHc7XeErnmcMUYgJa0ds+XggW/TaLwECG9aLBxV4Kjz8+mEvTHu9RJewby86pRoZfnWg

hA0A6eE5neUX0SyIbZgAb/krMCtgPCve753QJglvE4vjYbEc7Yxnpxp+oqJFyXXVL/65vjFSibCxXiFR

TdEkH+uWW8EJWp+JnZEVS3K1tyL0dUtXfvFlVAgAGW
qzE+hfcGub1n1S5ZLnmfZjREYW+WrbggPiVVJ79pLEzSn6PxR///BKuTEnmEDJkcT6ZfS1+ZkOaO8tWf

YyD5/fIsjqRdiP/bn0AuohHNO1I9nOD5M6fS/kDJt/msX8+NcQrMb4NPqp9Hg0lq4sOuZmAvi31I/Wr9

egjMlKkgW2h5hG/+2jxzbBg+q9yu78Zbq4h5EH0Me/
9wORdreSMubU7i/yZ7qv9Q0Ot0nXUTOZmLPhWWlff0FBp/5ysj7erOkuzRuA/cBIho8rVJVze45kQfj8

M5loeQGmLZcHhcBAOmV3/okRvtLW0jSqcP8Ds7/WJIzQb/V6AjvvUsY0CEYOy3Ht0CRGuiXUfTPJLU14

DXyQCn4iLsx+wH2aF1mqcGnmrDoQ4LD2CqvxDQu0zC
07XJpn5vD09jmZKtnW/RJeTEvLSX2AlzVRD9trwQ/fJFXTaKHTxumSUrsf+y4jnfhonjHyNQ8u3AP/Pk

5UMdYoqGHaVpBqtliaAncTDKiZs39hO0h8+Q7mfTEYC3WscjCtzH7hoN5/hvx/76h62q65ZIvcA8x1e3

C0sSuJQQ9OZJOwqx7Ixrcz58UUpE2Dr7uadzYw1kA1
SmKlNxsAvEaMkwm4UfAYNDST7d00KGlkA8DJkJ9m5L4wD/ntFWobjAw8w88+NNUfejbwPnIC6xcWo5Ru

TXU2V2ozr9kbBI2eshvckzqrrWLByFTzWS6Kvj5vEX1lASJ/i9+fnQmrCucVG+EgomOz49hvo40jxQJN

vVK79JpGfZpgSGqaO+S0alvapf+KzetkWGsJCV0N8Z
kWNVlYiQsoQ+7RdrdRVx/9kv7NkPcqsycqaNzmY8y8MqP30nt3ftIFmxotN2XXXvjWJJe3AvSkiFMtha

EthFkYd/347LJ4tmjnE1PQMKpFOsuL8fR70ZNhJlH7yfidfeemek39dX/j9oBrNxqRJaL2hlXrjRmhYc

R8zdbfp04CzcZyVEHx4ccIOF7weACu2iqPSi811xxW
R2KfjnC2ayzoLd4ryRIriYVdjldkWxRPfbsl4z4QTLbWs65uHM/qlmL7f6sXXKLPqvgdmVfBaQZoCuZf

XT3UCX/8IJqlGt3wsv0Zqz4Pki8+iOVkQMLYuhN/zxA5d87KYvgx0gPDNfLJO1fijrL4koQTysGAqNvg

nojIof2frd3XQi+ADkg0EMvsyx96XdycBKSctCuCB7
0gLVs3keaueh042EyzyH8qQU17DZT1izqXxBDmqiINLYzfnc3Qu09GTQU6ozeAwv2nrO0vkv6/uWyBxb

Oq9RUHbytoBM9cUvx4tZ+jS0HvSsufVrGnManB96Uj/am5BadBEzZcWTKNwmh6t+gxNkZXJf64vgBKRS

ob5oqnKi1IdbO9Z0I+gLRGCsnpq64MxK4v3xN3siHX
UEGU4PPU8hjE5fvg8D+fL6W9FWBusKJV80Zv3K6I/p9db/3W21U7b+/EQkoUEs5F8ku3PZcnWx4TcLKK

WHyDtaexopLetwfFsJfTanmM7/Gl6wvwzsJ6J/a5T2f/rAAqTR/zAYLVtifIPGMmXNvzQk9AIgLPoPJE

DlF/X8Zkg//s0fA4rlQpZgyn1Uef6UBi/MjV05La0l
MUqS+On4mu/lXK2e1pokco6xmbrpFKwwHNg3ITsVwqTqsQTSsQcCvqsqfsGqmjBBsrRPFWCXuia1O/sF

Iq3KsXrOS0zknrIMs5po6gGH9HvxJGQWlwUj5Z/PKQpwSRS56a2DTF80vRLT3JLfEmhjdjN/iQG3f4mj

sUcmaqFS5sbPrlUz5SefPD1Z8o1kn8isn6+8LG65jF
cWb+V5sHXyWV+J5RavMj38kAaDlqTZp8vZo5JDvGmCpLAF62/ZX0I17MId789oDddwc/H6yRFpOZZb0Q

ApkCfQIRGjw9zD4uHHOybZo+PpdeOazLtzAN18YoMen2C8zZ+Pk3tvAFP5FDS9SpMjXTj8s+0cq/dG7B

0iMLQnPSAUxmzcQxJ5nf7XCGnn37Kwv0VXBu1/9F+Z
68YdLT3+FRa6nzoM9sq/D22X4oc/ZVWC0x+d/rBfH84jbkj+XhksyRL26NBmMt5hthX7APKHcP2wFscv

TvfH0azPvUmqQa/MfwLfvxnr775lMb13Wbi4l7fkdoKzwhlgmPoRG+nazPyy1DcUNEtXenFInmKwEO9V

jtPjpYlhEBX5evP/Q1QJrogSEafwe7aj/jaG13W2uh
+S/OP8P6h1euyvhQe+dLoYEvO9Qv8rPEdBs4HhpA57NrLo4FcZGI/C27fchQWjH20GOcWW4xBol19d2z

pTAjIt4uMEyR4kxJgHQIWIdFsT6aqXamu1c9hvxdmNEDClaeThh3k04oLAsU7UWP+++8lzs31pNkWXTv

on4eitbK+syMEi9pfh/XxCqKx725FFPjuhd7Af3UQs
LRc/hceHP41ENVH0K05eSidcJrgtf+hisisHs/KmwtMdsS6fB+hJX2fCz26r0vWAzEUiwu2ELofGfWSI

BqEkZvctRV+dl+GpoNezLaSYCTShM+7ObT5+f+IB2U87lycH52Nbmf5sstKoDaoVP8UfbeeME5p4Y99a

wU0QUF6sHvqRJ1zu62i/EaY9h6p6vsEEk5KnyHFShV
SyWP+dtMisSFheSqLkhpUv6E6u7Vth33dlf41EH0ScCk/A3Uce34hZPdQ2Jt+fEwXbiPZr2XYVPe5m1B

dDnwDsQ4RJlqYN9gT31saYC57OIM9roLekUhxhRHXgJ5nM4JTa44sPP3hr+J7EX4Bqq1HlzwqJ4CPq8f

Rc72Gw/AONRo+y1X1Is1pT0IlrMEaDLK7TkA83UbAC
d8l0BqvRr3fNxTabYqJokWXpG4leWcHg742SyON0RVjtZfrideqDoDt+eCPyZ7XVG3jAFAE6p2dLaqOk

u6apaF3y2Aony43qJZ+RV8PXXp4yMQLymTY17wJIK2objqf5Ai+WhiOZOdb13sgBrPDrSMNx+5ikt5Cc

TGbHSsmQehoMntyHgyQYOYZTiV50YCh7wiBHNzsurO
LFFlTP7PJGXevEOlgK7MnnrkOyZg01eWh4T9oVymuZfblHwnhb2j/BApuh/Fd7TAV/2xcU8XlktdHzPT

zuSRtCkI1obxLSkGGz8iWQqr/m/t8pSQ7UQ1IsCVa4PHSaNYKCJ6qvhCOjgcxPEadwk9zndbZxRa2GHc

luV4FEvfLVpRGsLcXvLYwhrAUvxCg91f51+ps294qy
roEsMOWG6Kkkm5Jjbx84QvgdoFF3y7Art3tIzYKY2en/Jm1MQWudXD0/5JT9OZtfZS1pVly6NW3QoGnl

t44Dh9wzgkVsKVTMOku8XzWb4tTokn1WQ/GgAuqXrJvodupMd1MC4ZCZIJEZmMakZFOTbSh/brent/iPen

kTqkLrpUWOL7tRh83w3ZNZUQ1qMSeuSu7qOWr5O6vb
/pTC7QwaEdxzEN8R6R1pJd99zcALKd7X1TCTCTmIdzfXodCW2hxiHuRistYnZtPj2gauXkrSUpQ7k6yg

3c3TaPNjVjUOhagnup74p+BnivmFGJ8jRAZ87SI8bqargTK51Gj1aBPS9omFm+ovhCP6wTc9RB2ZpKpm

vC1TqifEYBR21UHXBYTvO3vkRgXK01nNBG9VZU4l/g
d9N/U4o+vGtD0ppMhNBh/nCEfhfgennA33jDCjXuUJudsnB3yEgsan/3M4JwL0M+kkIN0ewVTDEpibZm

7BWaSG3rR1Ny70W64bq7b+8dJXuyR/gIh/1Su+gNqyao50frh7GoHPzWDgP4Ly3D97Z/O45CeYTSJnJL

5KyU5USWRRz7Ezt1b5U/VFmRnQsYauBFp/0hrp/ESQ
gDIcE1dCmi41RZMWzY4ScbHwxCkuBd/1WSz86p2+AOSzZFATscs3KrMqjHi+FiU107CZVWUYDR6g113I

B6QZefH/Xm4Qfpx+0znjrbIzJ9qWUyqTGISn0Ni0wlgYDcnYrTAnzvpFwHousMuN+fpjwXPY/p+wxsRN

S+MFcYnc5W0oXoA8cr1ZGadqI0LbxEKlSIXr5NCjrl
csVuM8uOajNIGWPcPR5DiL5ZUGlr+E5xQ73i8XdXkDByzyx4/wZd0/UQwJ6FpOpGp2Wg702CoNIRAq3Q

ewy+6Xs0WGr0K6SYhT+JxU8Dt2rm/fd9V1JpgL1WsfquCcdc6I7h8NE4LVSciatf8PvWQC/xGQfPGwsl

BUFshPVw1/2M/uvNWxRtha2FPjHPIcLsXxbOFjwlEC
imnb7h7UMuCHYqoCV544bXCRcXWcnTwOwo7VyVNmQAcZ9SaOC3HbQUsVzsGwTcpnNONTQHjSUv4Fd/A9

ctiir/joYdflpk3CThCGF4HJqhrzXkyx/Mw+9gtYRRr/f57G2W02800N11TKsGZ/h47BbYW5au6xiSyp

I//+gv5ZPPK4AWzlQ291hziIDcGIYhh85ViaA37I+G
i9qHRxqi0tMF+bpc2gf+by7XfuxA9IhUiKogyy4R1QFR1AHjaaxpXjCQplp08OYb5Yifzgh3CfmzOnes

h63YQLwkRpukh/X255qBcSxX/xFtR2iEsUZg+/Cy5ogBk2jj3t6fiqFEEID7L5LjpY9O4VzkP8xST8ay

mZOcNvHI0+gqGPKPnT3UYIYZ5ZbjzqAmsfhdrqzktr
c/svUHKuT9rqJ2FKWeUqAG4TSdUdc5GHpHJPGoRIEM3E50LuFvQlzuVerxDwW2w6LZ//IsQzEsUsdSPi

hdLsh39kwWqTJ1OfDug1IuuRccFRJtD764qkH/63H3u+7TH+fpzbKbeGCS8WeNGihr7+bnsXtkmYOSq6

NkUXnEp/2dX6U36b0eP1hEIVe1AGLtEJFv3W/4a/li
cy2+zNuY9lF1rDTVAnoQZJ27hdfnHvVDSsavSvxUDqlcW9S4rx5w+3fM+Vzgtfe8EbRuCPX3w9bqgkUy

dLM/EgjfuFGyFhcqTrNew3vPFyqE18Y+odQ+ePrn2QhArYSlL6jo5zTPwXyCJC46tut3J+zRYWn1GFu4

7O0bsZQbMfgVzqtSiSo723uhKa6H3Ldnj6/8uK61pq
PoA26riy232CRnbkyW+QiymY7J+sza4a8IAUgJpTLCTp8Jd8vheJPKsCsoOujxujqUs2052O2bRTG62X

ixGrd8k/G7oU/t/Q9bp+JjjEf338szcDxHTrlGxrRwmGr/OzSGOj/lq60sA/Yl2FBdrGN+WGQWmu3Cid

s/ghjnqlFbSUs1ic99GN2d7iM1R4HAnW4SGbvov7Jm
0tLM/KXukBIZEWgej5gI8y5UGs3LZsHjwMgGaYdHPbyfSBgbLG1/gLOo28xxBh0hegebzUa8Qs6sbpl0

o4h8RdtuaBwxrP8Z/tPyx9GPxkOjxi9/IzMuTKXQ1N6HCsa3APt65mFfHkjWye1QKgUD/wTaGZyHgYUu

kUSyckUWGxDD67lAiWCoWyQPa8/NUlP8zX6Y4493fY
OWOioK2YVL0AgloagMtQDehxqGGJ4Fw8bbwbNToh21WQtaY5V/djlLrsw2qpnvNNucorrJH7sDApr9jI

e2xbFFVoHC2DOr+zBO20vruWy1EBrFyH5x5IHHJ6i5Kmwl7I5c6c79l88pwi57MPSHimr4i1HHYYKHkV

Bjh6G6uLmT47Dhto5mjyL3E7p8UJpGLmxO9ipAbUX4
jC1cdRgzv06470Zm5FQXfK0tsDqW3OW8hL+ExzBkKg11fDbCgufUOpWXA8FNsRo3j91lQrBqWqWCnTxO

yxQCqRhGKAoaDgbi+XXQYKdYa/pdQJuuQYOEMGLa6e2odFt6akhyHsP5R38M6A+siQ3pp39eLe6FjVBK

/h8zG0Qzxyxxg3xHTM7/L9fu+BRi5i2/0fo4TxszU2
lH3hPx/3wJEX8wBz8uv+Ceq007Hoc/8aN464JArYknoQo1KJ2si+GvqiGTRfW9E9i7U/pAE5w1of4vJc

UzVQ80AUJfGBzRWBvLDB8ltmpWH37t8vDOUKRbvqnl0dFzM8YJtenmgiX9ZQ/0aXGtMu6mr9QMwgDnB8

/MMMoHsss5OjutX2chsZV8UWTpTjMB2t7rS06c9D+9
d7ykqdFJuZIIuS66N/wMneQKVkp6Qy4NB3ocT4LSg9ywcDLXaJ7y+J76ueUwiZ/Ty0Cvtjhbw2MmelE6

xdhhJFmNr/RCGKC3i0MPucgQWNbfp7Ki6YBK5FmSVedCtwGItTQt2/dslXkXUjOc7z1h85o0Y8W31iTt

LsE57dVdr17ujU+2Q1dxWYbbRmW9x4A8PxCH4JR+0L
h4S+cp9mJu+HNwTRyrtaoLE+Hd+mD+N86M8GMNl523mZDq2Sm67nEpLDS+2gIoIKJOh0QcgHBwUtcvsb

i8C9kF8j7phqJQWjX+mGYcB9OWQ73ESFu5ir+qd8K1clAycZQik6rLvxgzk9treXqaTvkc/onEy3yiee

vuC/ROpmc22ALfEGz/KDjGC8qXAop/fvHTO+quq/9f
ocNPc9eIm8e0cbQImjXr1MYY2CWpyytU5/5KvYLCCKgbUM1hJic1Mfiq5YnjUYEdzIg+6UrZoffLy+tj

5Otij4ndcWIU26fuP9t/1X1QNfRE72NsNsmlT3DcKWBznfmXWiwN85UgEBIIILPFoS9L/YoVp4hB+XaX

cYbjGSt3CSfJkFaVAtrEQcj4ABuNJuN5MfTYNSHTQs
yWjnfabbMfNLtupZe8quni5yG2K+QyBuD979iJOJIB3Lk2RlSwiWUeGro/fMlmytIvo8Ccv7NEGPByyo

56gd5Dz1APNlwnhKqVzcoolfmkiizTHThtclXwPwipNvSw4lH7ABhi8XxWrqHSVEh+uU5tMxhWTKXbXp

EnZNBrBYjcvBKo7hqF+Nkisxh/RDL383TbjycT1pMJ
Qrb9uZze24Z8tqBtPVh7vFHaK192T5dh7mU6D3unrwLdZAUVtWrnaAd2xi3F8FGG0FCRGw8NwUrA9wGt

fawB/hypzjL6b9lyK9nbj9Ll9pqG5HrgJkTNd/R9NdL6/qulJjLZ/VO3MOU1Oo2sHEuh34+TckWkCQ6H

KhkWBr+TlQO8ETQeFeR8liMPyyenmCarPf6LZYyL/I
a+ZLjVScq/4Rhwe0L4RjBcMY15mEyem/xgLxDBOV1xeaySx3XmrHR/UeD3ZW5i1zID1arBpuuOizlsK8

OTsiUDJsQp9Zri4kfii75ZVSilhbpLJiuUmFlZ1XsE5cv6518GeGRM3c5F1CbsmqFzNgUJ3QPCH9tna6

M9NvAIxcU2n2HtcYB8DjGZioXDVNMkK1YF2MkdFLcC
OjyIpI1x25T4EVcC6ZluJihNpzSz53f6c2YuZg+Fxa08S6PHvgsfFnIo1Ir/HqDKmRcf7nrrU4X/35wp

QD6Bs1NcJdUoNmOTTnpM2FHFJdZClWWMTPWstPwDdiKqGcj2SWKNpLqZWDYTAwZOzE1zIcm8Fvame5Gk

na1tL1oijod63erTRberd+a5ivinQgu1R5GALO+NR9
+cop8v6yfM/P1YiJ/d+dBnI02l+XK9kydU5dO5X6QSOlFWFZrbUw3lniBpIi8QqRHPJ7v6tO/7QTEjmh

2aPOUunhKUS9HghVlnAmIabEX+o8fUWszH/f4YsG4zfVUsFqM6xZgkJfNdyT/EMwsYzP5mUaYrBKbXnY

vxFfxW+S5cF7UQW3EmadPfrQPBxtLo7iEh0bmHFzua
8FzSuymtMmb0XgSwt+3bqbWLXPZmPqlkQS3iTRKF+K7kyw4eVbbS2ZsnpDpI9Io081xroCcFlPc5OUe/

QLWbWnT5c+vT+5PKmIluyuZB6y273fqgWSC5eu5cIxF658djnM8hI1OglSaWHaHARrgfHnZX0oG65Jmt

kDBvcXvuJsucTpZPvcCJltBPw5jVpaYQ229X8KYDa6
rl/J4JHfbgHfwzRV+u0n74zKpsicykb2dAxXGSs6aXPBxF4D6Pk++YqM+27jqI7YOBwOCsFQeA6focLk

3kBHZk0xvehfafwVgrOD7SyJYy81DuubcgP/ioYxJvAGrH0E1jr1bwwNkwZjjnagVCxvrJJPi9HlUmTT

MJ3eKSA8oJTYbf1FsLNs6LD2eKDvYjg3YJdjwj4hpE
9DaN5Q125g65cMatxMpMwRPTb7JHh1ASFWgaGBnp3UFfIXrt+r18E32JNLdeb7EoO1a1xeHnnYTv2WSE

cLiJXr6/xuYftB9tX1iPSbiSOOSrCfUOH4OiOg32o1qA33dzyQLJV5gIIG0yyZQadsplIFOcCuvWuSoC

u56EfwMjEbt2T47nLvERPpAmpYQfui/As1AiNmus1W
8n5l5AmseCH+x6AxxSpOCpjOdrNdwN3Abbs2RQYQ7DkS2Q5TqBYtoFaBhc2mt8fHe3fnxUbj0Ge3l2FT

k871qZMJ7hqR4v1JTR83qUOC+zAU2GF79gOlf4nrJ7L16jMZ4gRN6W9+6aFYlAAz3S16BU4Wq9+MnhgB

pyJzz2dUcqJIoO7Uxfg27WAgtYfMsBBzT3rPGCcAwz
XXa3pJIl5uB5oNpeudGtCbNlkeqFia+FIRyRmHlqP6h/zfQdjzx64f3No0RDTAjVPrUtCqPzcZiiDufM

Ec1g+DDw6Lnu6cofjgnMTcs9KXq8vNVVhHQqOHNiZXR6b9TFin03Z5gP10CVUk5FCRtHK+ZsAbe68C4A

W6FHbUc9g29zhbpzvCHZ2v3s9mf+uPUo+6hcLxbUqh
fTx1sfbOF6sdA3bU4o+Bn8cbBrgEnlys+mcBa7Lzkm3OeK6NAzr8A+b3JKQ3K1F+aO8XX/p+ktZXE4BS

6bv5xCVo44ezpJGUMEtGl+OCFNY31un4NiUuBPIc723utLdTn+m2GfgLN7oQQT9zOuapNbAovt52cUR6

1IkpxTZtbXobY1T7w5MqH5mS4QYMy1wereLPSX1cOd
8SAlaoTSPoLqRBAaBTUm+jMvToB4AQ/Pxww6NosL+veD0mtcnuYbywKRYUvD1RAcg0CN3GJ2KfmtG2ZR

Hso6TNHbBXqlmL6w0B4uJwBXk18A/pIgfn+dt0CNeiDjleFPEBLte3orUQtDKiDNr+3w6/9vp/sfdVQW

5HL3qgDKoW7Xgs6PTfqNpaPxTZQY8jPfOUkxA7MNCv
3bLcyOFeXtqJ0L5DSobhy3/mtB6qlh7xdu6x9+pHYH183pvOp+b3/hKD0UXFg29M/8094P/EUV3f4WnC

xZmni3ZbDnmCA1m2lfcuX3oK3mmlbdtPF33IXuMngelIf825K2rbdoKpNST/P4vYTEUW+JJd8Fk4xDWH

UofsKHvJDEM9srYdGM6km2Jk1t/heBMSECM2l6+v52
HJVkmlRdFpDzOwqX2BntOlMo5Pt67Dwin1G1y9iT0CRTgsKNYrSHIfr7hrwQYfD+KstfG5uPrq2BPqox

5l4ji11kVZSw1tzLgbug0HANWfig/tSUODE3sJtmjm8+p0MI+B19gAmolOwb0if9S4ogTWmNe61K1gDi

XImoTQM57tWh1o6SQyge7Qbfx06GhAUgRJsjjhAfOX
VIMzgjzXGzjXjQiy++w37qHHkTdGkv48joOrgqfH7T+GO7nPM9I5uR0wqoj9cOLDe9taI+m0gj8ntbty

J29CqKWvR6+VcyWajHzHKDOQPeL3yNtWr8IyvZjD8QDwQLoVEm08YlPzqch+e4qMliY2XvaFcPwGDRcM

Bn1OC+yt47TMPAmwyqg55cAHmJFxZz0UJZJ1vFZdeo
UFANbonIcUQmOuEYoXbCRvXRFRET0fSRT26byOJEN9VyLD9/oMZOO3+o8Ktsb3XXeFuOYzBsWOGbxXUf

M+0x6oz608hiRJGhvD0N+mSVqDbxMTcOOTg9y/vYMDfNeFtnvwcEJHhNsaX0Mw9jN30Rcvf2br3MSgEF

mTVFx73ZNp9BtJqVUmVQmov5r96YHZXLBkncj6vO4I
b+MU5n2PCYF/MtRTwi4BW70MeQL6pA39TCRfx+X4C+K06GxavsF7CC+kzDRoRpkaHMEv3hqWi+/+yzHI

8okMPuvxpazbXyL2HjtW2+fMC4LOEU/DTrr2S9cZCGoxt0Mkyw2fLyPJGIR8rW0aYl3HgH2zG+fcQwG0

wBN9jTwKH8gT5wuj+vyeO7La77e6VZhzaXfuFeCxcZ
9VYgMXsCYU8ev5QBmQ8ZyH3nhCbQ089Su1vMFAQWB/nS9pd/6/Yc4vxZMw3ky9R9yrqXKSUx5Rm+BEhY

CvlfCXUyanhJ4hkV/6Ub4SHLyuxYUrd78b7eTjimzrt+/2eOo7YKp6tZKQo65q1wv9bR2an7BLy/B/V+

4FWSBZ5HK0tyfBGw9i6XTsf2iXt44tWNZWlmq5tfjy
jpYGQSJIjfoz/QC8bJH64WXviSF5W1AByxw78QCnAxHD8TJ0Sy3d0nqyeKTvGD3NafjJ6SokUhpOk/QB

YFZUjV6Kanuo8M5Wjo52wFiUxPAbxjP1vMWJ+Mz9p5lPN000ziV/h/5gUi1YXUtvNE8R0srfMplCw+x7

4Feq4srTzeH+CTkPzEADuPG58c0em9yCL9ym+iYujF
+JefSBQ0iSWTrFlkQ4Sxm1F+cSPBdAQz7M0d7sMeZJCy0rHUhaGgFzz/rE7mYFp1s+s8M2QVGDrrkW7v

WGQ9NKQqftMIGgiXfCQ0nxJSPg4NQJez9Z7+UjOc+HrbehIf7BNQtcw2OPfVUz57wvShTjkdB06mgp67

F70mORf1LJL8On3bAOi9y56jtq7/P6coN9zOfH/W7w
F8j+6WNCS8IWo0RuICaF6PoWwzyNw7EW5yYo8i+6zmzEZWRNEhDBpIcizhTw08Z5V2GkqeVw+mY7Lmau

3tLfOAHiZ1x5R7FwTgYIlz4tIfvqP/AVw3lI9tBEtHA9dDFFFsrt5fXpObPgVXdCKv3JMnaYBbSu/d/H

MLZmIaVZZR1jr3iHcKsP+yRp+FVNlJKtpitcCmpStp
h+aYd674gnQJN+YyJWte5gZkDQ8ym3se4WdP5aFtG1Ofno96LTigq03GrrHzW7gE8YBUxVWaEFmTPgyc

FH/HR+3ha/DptEieYmATu7O38eL6VeHA9u+PdqPQDICGOvHgA/FoHyBuMhR1nGWMNxb1jkyNnYmt/uI/

wN28z3cf/S7zGnbryWqdSvZON0fFWoa4pRdGciKsYm
KhMbhfAIZMRbh2W/r1YFEdQgrCP169yoq1vGRC12y9OXI0b9QVFFy8bwW8Q225Hx6fVBGXboM/Riz2V6

rmiLYJCNwC1lJi689utOlpMMA+qlDy2MOFj6XeVHyeqX74v9dmk77232mVHZOJ6MK46cEblipbgGcLZF

9SgimRDjhO46MNHnkdv+v8jLRxHB5ilXB23Xs9dWlu
1DQtDz1BXRZtseNs0S7+FD9A+5sMoEm2i2KuAC9ArxSn32/BzNH4EqInuYrDFBwOLYEskbxdcBGPzbRw

NNtmXvf+beW2Dib4eEZnmXAaQ0M34m9hZ8zIkjZNTjKYZBmcsVYMp/Xwo/fk2Izh/1dL1wjpfGwR7NSv

56A09yYA4iqnSsXvT8Rb5nj9NoiGnNX1mlrNyL7JeE
41BotTdz6b7gCRnIKw+oPvbiArJcio0WGT+ZL9g7/G36M+POhU5AHyTIZpv1WNNu8f9qUMGr1lw1qy0p

EX3+NdYlS/EUbsh9kbRValpVnEyJ6ZUYWXP93/6ti8RMJ0vdsALtxPgQzb4biff34zkJX7JGzBLG7gbW

wHBRzooeTtU5V97S1KcVtHBG4qTz0MqE1f343gDUjv
OvkVR03y8osYK0dogeV8FqHQgW6qHLNU6MbVkmrWwJ2Mp9XbKYE908QxXHaF740n/nscitZ0+ay0lR+3

vc2/LiEZlnt20D9Tf+c1/r9pX8di/5GM0XtfILRYFhcfU/QYnqzli77tiJqIuiLXnDuYUjRxkjXuTzcL

e7DHk7E+1Oyq0cUD/Kh0qpL1XJ03GGcIkPImMwPOql
e1LzuaqraSbhvVgGwoAtgJ6O6x8Z12uQFzRr3SIDwgyodEDWfo/ZQT+e31pQG10RNJ1RgEoTZ9+bFX7V

IFM5jtxHoB2uFMoE+iBVb4UhHeCzmF5ZMVOYUC1rJEQLZixpun4YluYmItNXhqD2xqs1DBCDIjLNEmqw

PEYNbfAbosFgPYJKNMvF2O809qbqAlOwR976RujBqt
vmDdm2bkbQ84POM501rYtuLYpidFZixmdjJkGkXw5i4vbF4qIHzpI5931IlvWV7nA9d/0bVYwyOuaYYH

y1qQRPvTXnAExjHxBpDDJgtdbTuPdmdzCC832xkEkc0mzcM5eWaS58bxGolnBuO+GC+mcjlu+5Tjui6t

9Suvide+Wwjp6gt4ivHK6baDSk91uUnhltkvZNJpEF
Vg2Ll67/hkOIS4bmcw7oNL/2I2hPbgECM2DmyfwgeRuux2AiyqF5C71EaeC9/yFTuBjhqWLa9Va/NwYv

jX9PUWzYBk6gAfWSKUoAkh7rFo4hiHivf+2PeJyBY4+DZ6uMrSMBqxRCv8I8T5PmtobKvLoTxMljhFl4

vDb/LqBEBozwwjJ//pOoiyUvtdQBnwSqe0fS+L5Nst
OgA7EWpwCUVuhsVGT/j42hMSArncZcuAR8S03265KcID8rIEJRt50kTM1dwEsdVcGmwc9BZj+GbmKk94

YiS/EokR5N+UE4NzOlFqB61mlgChax3sq5rG2cnYHyVFJa62Av/SKZaEbGA+XC0aa3tLcjtqOUkmnrh+

Vri2gw29fMgmF+gakdmd6OZBDnTKFUcLkziUrNIVGF
YqVOSDxMsY1GkuDFyv6eLrg2xJsX+7uN3okn/HF5jJGGd6NIgzqCY95ilSLNaNKzA3fZ40JoWt/YAgR0

kSjTrCHmzH3meyctbD4IJ07B4qs6nkIBNfgLwyslXDZCKrBMizDfmKFPFLu0cPTXWVLjeGnRwF+H9MyV

x/gl2a5VhGYJKcM9h4WbIc4Baq2n6/3b1zCq/YmAj3
3gxNP9fmr9GXYcOPSa7e7mXHpu6/etJMCj7cEMiGp5no/rqpvPSaN1YBwi/aS0SM5u3yKb3u3E9Hv9Dy

eDsxQ+EhmKYOQB8Tz4uTmVQw3qZuYqOekbbuKBPnFv+BAF0Lte2wac+di/J2scNIHmyQTVuLnIJB4IE4

3NbX1KW1mW0GnkBtjvKL7RywlKEh0qcU9jV233/AGA
01jxeu7Dp8xurG3JiRmFxJA95u1+9cFSfEcDDr2uxnOm1sh7/MjoFt1sxO2j3EdpC11Kb/0orgN6uARB

0UeBow+yH0AsUjxh74Reqbwzxs4b749yblTQJQlgyh0nBLRrskHhn76vmxRmiJOAVXswm7xT7BPCisp/

dnp4SkvDKweZSh1b1OmUyAQdsm4F/MQJHvsg9u4e3F
DPATl9sEM1QmXbE3gc/ZIxVbfc/GNRmutOkLG2lH+aiLOeaSxgR+i3uEACbucmNgrdJfucVBeBn5S4wG

/9PjGVfhG+8gDLCqIUGW8c7W1OvnM+7Opm1Ikthl3C2BnO+9rk80nnLhjeigNoQc7M3/Ua/pl3UG315q

gcPu1qVZzxYOE5Ahi/yCNl/5Cq4NqHRp/WdvBdNL9Q
L3UuLjwAYfnRKuGKy2BroCdZaoJGlyv4W64jJTrvakEYQACiQUWyfqL97h3A5ER/dugjzKr0VWYZoBUx

7ad1Qan2ObG45DQ2JuVP1yzAgqc2cQbjfA7gqBlIolIViZCEQJnI5lA6ZHaBDrydp487fEUu9mrrz72t

PCZCII3mtemNGuS7hwkw4v3lWIhVwYm+o/abANf3SN
pnCtVM9BVxrc4+mxIg7x51ll7Lr9ycvIt6CMNMv+9ELB1+h8TZq4B5yr/T4jjrW2wYWVrDp1LALa6J1p

efx3fMytxK5DKnq65jVlmIcAzs5TwYfFqHqX540rTnMA00UgsyRQKdavjMyL+WSbDYW4p9Hiupz6+Pcc

viNKXzik1+wXQhmMluB22xFly7rWeDnFuaMU4Kbm00
yjXk9AZflL12RnLjIx0XaGFLloXkmXdj898qjobQhTh0LsNsCx4PBOeS+gSO4UMllO3idsfZmUrl4ZF4

2HxSohWltd+scc30nndb1bRPYyUyfK/033F8DTM9k0t5UBCR2CEsvqS2fwFHMLdGvtvBo6niId7T3ZI+

0pxFmM8HNnp988T9W7GYB+3ddcLVjW4aDKtHRsxrJt
OCufVNywe/W40kpTZpLM/XM80SGY22Eh+YB7G32oW+6A2Ix2e4W8MeDNjHOEbyQd01zwqWC7qF0SIdT4

UtvOP5gg/pXgedg99tlx4rYAicygKFCVkTlpI1iGDdlXj0lsL3cHvc88mfeqboXx1Ck/DSTRX//m6fHr

vx0jzDITQwOOqPMBZI+s8AHpgUkp7RJ/CK8i62aJsF
EvGde8pY7gm11Gu2ndM7VsSaZT0GJ12ofd4EyxZ9jFScZERWKxNKxSnAEgrgY7n5mq05W2XOwXVubZRV

cErGTBf+IjzAbtZLDc+J4KkMbuTb12b9DVQNPfo2tlDvFy9mKdTAMf6Z96Y6/or6q5CV2CGuVmTIrPQ/

iTPCh9SnELp6YlBbXPaZC2DW4vD5xzYyt+jwI42YKu
xvzLq92VVMlHoFF8jWcHy3hnLwSUvi1pwsZfm4ckB375ZvCVzr35y4aYcBxBVyeIP1iRut3zYhP8h1dI

sf2a8/qvygQwivtTt/vfA10c97D/40zXh3ZY31KHI/BX3p3uA1zzErNHaCD3/QGCUiSUoVLQIySA+1wW

eNOfy3lfaUdi2ueLDpP/5LZ9+A68PDb8pZxv/KkMU4
2YEUXxl3akx8cwuipiR10JLS1vVAdFtBWlISyXpsY1XuKZMOaLh7kyiwP2dghpar5YHvOe/Y9nLlLrpE

UnlaXz8Hc36SlSJQobbMxIx3JxEpDu5LD0eUscL6L9yxk03jy5C6TCLzMjt+/kmfwgc8ylCNTnNsHyDc

rYq/FXh9triaIosV3Ffkm78kkAdcZM9jzm94KAS5+/
1TDiv6U/SIy4jFxHO3hlvhorFGTrcBrVOyxhw0ddCs2/iV2ZfIiF1oVtOjCzKoC4FeSpsgwSpsd0f+GA

CuJ2v1M4734wMIqvp34RUHNEwQ+oEajUPpTlkxIG/fFwO1DskrINoGiS+DdjJ/vhjOuOTlzWK12aCfSB

ugEkpEYcSA//VZqNHUrXsZa/pArAGJitkKgc7Eqb6J
urX0Bc7dgzN6viUPICMijGTld5n5Fyz/76ZdnuILyxIBU6MACuVv5U9YPuCHpdawg7iJKnz3O1h2jBgI

WmrsZVum4vl4pSuMDFtrtQMBYl85VUxSh9Ak3t6+taWQV5l46YFRIwiTqmiDSwYdPTEp7+Nj31vO5587

cEL4O/ENrS/fval+JSA5Xcc3iIG1+Ogf9f4GSqvfQD
WGyFepyznDohyfnk5A2ZnDp+juXLKOfaUBSPJ7aHYxfmW4qY/4oJoufdXmUIfl2el35CMm+NsPc+cey+

aR8cOzlAIyvNQV1JOUtY7QXJs7pZCBCrMrFr/yVC3SmjugIxMt8cRcR2b6ZqS4/Vj1sxtJTBgNHGQJ2W

ESzB+Z6iEdMWQD7jfoqE+BJtgk2dAoeeSYT5nr2VBc
oPEPAOnXCysYKenYcsyB4UplghZN7u7KYvqPBijFSfNqIveeLBTar7irSNE551po75EuvfqqGPSAgas5

UkFtE6TFP5Qrvv+MHFXy8LxZtqr9IYqeTvMsg+7E5glXV85iQlo0wtg0EzGM4apLQeW0kqJEdO8tCnZd

zU3AeCzd/Nx8XX4Ie46Q1WD9cxEdbXtidbbri4/2AK
fwyQzs6jW8GHNJXi3TS3Hr0L3P0G4t8c7E6jV67UdHAwoLcZlb1/uBlXxAiCKUSkDA31g7Fz0bgvNzP3

mSdi9k8wURBYk5ze9hCqUcZlo+12JZtkPR8DIQalW82tBXFqQq3h+9YVv4TTj14H1jOCnqN7baHker75

YUgVq2hHE02opxNl2e4Y8ZbRzKPDtVQ9ejv/aeVIDO
z7J/uY+O8IDVEDlHIsvqWZB4OHuJmosEc7CIkDES71B8d07dS/kzzptz665Gv3ALdHgIWpkmQ8PfXt+M

ZTb0x0x4+3qsuSj97lqin+I9jWJZMF/RBM8VVc5d9dzuog9P0j5oufjGYioVNjPyvuFjpdmj5vO+bRdb

xoOT8vbAJd1sflCmly1Z0j5AzFFzksS+jaDOyqDLZc
7ESpzQ9w7GVfLR6oOtmXKGmKeV/wDI+cwdywzbRydnZ2mHl6M7dGTZmmPB37gjixU5xCcV9vlSD0r0Ip

DB+5BIdxEbHAQJgrvi0PtsC7F5sSn9h6uIqCjphcg4Bmirbh/tvohPb2sjTCBhi5zUKV+wHkGgTownTy

/HlwZhHE3a1pqC12jbIiRHBwdetfFpsycGDY74m+3R
pFTW6VsXsTN7fomNOqU5nZzmo+ynEJ6tQcC3SJzg+oIHqJ1jee0RQOx/Cwy9WaubRlYMTXITsZfBJdR+

SD2w5IWDmd3sCUU3NlEkzVMe444xV2DmXv2b9Ng5domDVseMtqv8c7RUGL0WZr8XfIhe/7Jl+TV/ApRr

on1sKfqBHC11pqoYjR6i+Ys1P2Uj1rup5LwtEyO5sj
wePx6VyU/YAJGykjAa20rp6eQV76Vx9EPeqRnwfR4JcLf2Bh+egLvDAO4R8cHtFL9ABakOmOz+Ci2NC2

8pyi+gvJLRn2YeJBrvzFYzf3tWdSQPdQfg+whmQlWu8ZuI5/ld3A1zqk/O+6TViLHcdTv/xG7VO8Qw0D

OA+oWHBavZxl9oJ0MwkxhpiAf9cLwBBANZ+WqaIPw5
Njyb1Tv0SJE5bV8S+lFtcyXhTrDogqkH0YI3YiREabEDegUI5QaHHUiolCCA6HYkLV9ejdWN/YtxBpU5

DnfyzFU1/MW9+3xy1p7uC1w6+QOv3ycu2onZuFI5XsKJsMCT7I0N3HnijU8c7QdRNLH5Jmn6h+LxmFth

uDH+Ygg/xYZ1m918usLet9MzGVVNYBhmQoFgOW5CMs
rTalpI9pMhP/p6gi+319VsT5Xexum9scVrpyx94NNARTamo7nY/7CtX1rcMTrd4/d6Vt6BD40+mNVyBk

I9hkvzbSTkH7d0AOf0gAZcCNBs2LRQlZwaqXC3vifHgWtkFE/SS1n/zVvxh/nD+lblaAJUfBxqY5F8Jr

KfBma1rQO4t2nCFct15o1SpohzYcc/b1YL0rJAKumD
ETGG0Bb1rv4vqcKX6A7oqCwA+yr3rRhtgHbmhBqXPXzGRADt/U7fIrH704ARrsf9kQm0huU/Ns+p9mc0

ShEmAPvRVt1szJyrmwRvEyq7I5HqPJIQ4KkCh/uMPFuLcPs1jhmV5FRzi8eLid00qaWzyHzmD3Dodp6j

zrXgdITxVHrNocDYEjQ1W+RI5Ixsh8+Y2Mpqmmnd5N
FckiDvlewxy86mahntStIlQk7ong7sUotr00XO9d+fiw64eh/QrsLxaG+8BJ6FqRWqcOyG3OFzhdF6IP

Pbi1zwBVs0a4pwue2LGCLsWAqlJxiWMv7Qa2NJd2Hoxqr9tkG90YgolAe/HTx5cVveTrM2/zwwJoO7Xb

hWWq+8ryH3xIjD7bM/OlN1hvz3g1xj/nSropqdK0k/
l0KM6iY63AMrXJx7dMoshiO7DvQ2Y7jwiMoze7ucXgyw0iOUlqtLL0o1Vl4/jlh5NzosFgWVDh/vBT/G

9ZsNMdeumpZj125SFW9NVBazKFb3pvpzhG5pvv/bxypQ4/ihFWZi6xTeyVTs0J3clm2fY/MToZ+pSVkK

co7Fm49cYSLNWfKbkGobo3//GQ2enIko8XtYB+804i
v31tkvBYSRiyLdxKEcNuJwi3SjLHRzrxy0TMHDVb1c2GVyUIP19cru1QIUP17XpE/p2h4M4jWqzKwLG5

ZI/V2mdYxn0fNbwNxm8Ht0cs1cuZkU8005D0h7N0n8vUFZBhmA1LR07fR47vmfwlOSCmt8Wgrgo5yTSw

/E3005jJ6C0H4vU3YzLHofXMN2XutjqHk0uOCziIVY
5zlKn3ALWJ/zz2J8s2ywobnKa9vkfGHFICjmhglip5WCnkutJVpOlxbphX0MpLBaL4vAQ3mbRHTRJvmg

2pgWDrKgTlOHifexoPeLacog9jKZPklC+Uv5OfWuIHw8zk8kGpKtfxFF2taJMEkkS9kDl35id9PTD5w4

07+8+8v8mppT/x0M6gsZCToBkdTvwzVbZTkLsL70HB
+iXbVM4ExWL69rzWfs137k4BItOl089LWpAMA8a6KMPlPDUX7HYyqrrOqbGOhGv3gIWWCAxMjll/BmlF

HfI2j9o5S+TRxn3RxTgrvHBuzmUPrP+iccAcR1VUof015yYFevF7EHf3WHVzJ1sAld3HVVWb9qC6ckG3

13WQbGNv9KzSoLAmUjDZFYhexvXBWC5WvB8Lo9Arv3
9U91z5Z5NozBF9v+etz/G3+Ic4lOWFI3GTBOWzRRktMmqXh7WvxVxWM0SkJ795QgPjV6yxYI6Bv4jLm9

3L+hb0DtcrVfxymDAg0w4WrMnApONeJ9L8yn9yR0RJ7hGqjThvmwvORjorWcsfS/su8zd6NgKffZ7Hej

2+JPI6kUYHuZS2zKvGMEBkDyVr82EMrY5gFU9V12vq
mI0lBQeWUZVUgI6XBKyjMdOX2t8Fz7N4vNQcltbRC34hnJ9+b7YI79gDcHIV3wZVaiba5QnzUDGNWg3J

7pfyP9sHAk1Il9dmMFieecXUZYuHkUJ+3xbSpsyKlWsQJO3GGfi2js1RvKBfnTfGChvQVIeWnOMU/Bbk

sJ6fGeq+qnRV+SESFbSU0GU4Jyfd55HjzbkmzN0XGy
+6qm3YR+PfeW1yvyJhBlA56/8JdNh7OS8j95LBxC675fsDjV831FvfnP+NcHF01izG47uah8q6jr+HN3

MJBLtmzLXEW3oyHFj/vwLiBANkAJ+gKcdzifihVZLTMg8L3cMWSWqK+0cVzjG2fAjlU0BMb6gN0HEtlk

P97AK4UbLyoCqd9vUGoJWyL5WceWwW2lFVcTHxaFQC
PhNtPj/wPTj7D47SZr2p7am2+EGYzbQuukSVMOWlU+BuTS/cC1PiYNdW2hSwGd1UkA0709ZFlc2IDh0r

STQJiwN2Gfsq4uLYH2sEZ68jQTbwVSj7x5cKhIpEDS+Tr5eO7ZKe+IRrhl3fjO2Hc9UxQCdvlEyvhDoi

hUEtsPgVsw5onMmmD38BSTwGT++E4wq6MPpwG3nixt
8MZo4Z27aAgTcBVOAK86y/S0icLOn488gwktnt74xAjIM72YhivifLhfz4iYSTVQfbfxEkpxILX9e4Xy

8TVPKgZ3Q1I1CfFT8qNgWJj27/BNc7Mc6MX8h3MXFa7/o0y244g99pZaqJLz8GJygxrPBwtvGaNdHV4u

JCxqC6KyV/CVIedfnpmFxuFhxdV9HgFI0+OgS3wZhh
7ikqa8RMQ5MqidabZxTG6ZyAi9sLRPNzfHw5G53HtwG9VarHBKkdmtB0+7tVTFcnvA1vKj8idl8ia1/a

VTCIOIp5EEB2hPKnAL+dsFP7IW51OYpKJ9w9dI9rUqNkPaSjK3VVEQ1e64sWxjbQt2++iNLRugTKQopg

yv5lHqBV5NDVnnoKplSFGk49KoElya+tus9YInbv+q
TO1taoDI6L8tbRa1QzrqmN4ngQ3W4wROpBmgppvzibx7XiCe82RPcxnkIVFo9B2ECfj6I2vnwnNfWu4r

TM80IQIfwpptlGbDZfx6/OZU47Ui/VEm5Jet6loIWodCyySEy6PZ26fJjRgP5ol/3JHWxg0lFEJXfsjv

XFXfi79/AMjV/XfAm2HLwiOq7Vfzj7nsHdaG+bpkOu
uybzD/nIAx6pWzMeu2KboesIFUX2MYi6xvTobM2MbNQri2VLUI1bVBnyZSapEvS13H4XvAWpt9italpp

4vZtRHA4LiEmceHIMYFpJ+MOVrTyY1T2YF6kXDFVdzQm1ljU6ZDuZMD92TgOcyR9ex8q9Y5d7JkE5CsS

KNwnDU0ZgB6hRP80uL9Wug7BZFKsGgmnDAZoFoAoTL
vQAiOhbFIWc0+BrnouLUA/Dp64Q8VgDZ0nvS3jTdIwP4RSlZjf8eoXlry7XvskKPoxbgpZqj/A6QPeUq

yo2o0qHs3cHAx+wbnvE/9VCKUc1S41ZXIREy7kvfGasYy4PN5VuAHZchndk/7TXoNO3+61AnYZ3MWsqv

s9mZWkY5lEUzRhXa47CTH3svo9GVfYkiP32SU3Z6RP
HlnRhbFlNgxYSDQevZHr32YkiyjEIgJ4QkMy3gQeORd/NHxZjhWjeCWxVPXNNiRZ0jrOXH4e2VLMebno

fxXtGt081dGGOy3Mfy6/AEg9zfi19nHh9Z7eFPyIzZKwP3B8Lr1B3YFsvUua8GyocgXzdAx8kQNQdGxf

ZAA+Rs594E9wfthpcyKxS1uquVWagdHmlk7zAOCRlB
uj6iWMP4bH8kTSVwYG4xB1I2wAoW5jQ8ed5pwfiWrZ+lYYRNHMW0b9TCzKFU4q3k0TLUAzYWKDFoKvq1

tcR3dRKB/g/jHY04XBt0f5rCgaCnhqb1+KxEGL51xKHKKvYgYh58Fzz72ch/QiPke/fXQ8JnGhDM4yGy

jsVH/uff7Fl4IvamrHrg96Z1SpXyiy2ahONvtpSTLA
maXFv8PA5VWQCUQk6A4wcg31zlLkYTsjelKXCau0hoJBRWKpMWCvzBdZFenJXKQHyxa5LFBO1PvxrzrV

Uh8LNjb8i53rg/AYEkN6gQbQ2uKy0Z/Yznk0O4OFuBpYRxdmFe/4aDPY2LjY5J6qskxiaVrEJUHOx3Jj

DB2c78jxn/6C8Zyd/gfkRlpJ0O8jo/6sheFskcRBrJ
/YcxPDm2VNVxXlukNdfbM4bMiJfKKq7uz98tu3QV8HeQ9R0jIcmJAz8HGtzizZ2/xUteYt6eOUtaIC47

LpMa2Er16j6sohIvlru6kE/xFFbfX6QKXm3rD85FL15Boj2JMBVjBCJ0opTNz4HaXNc7hjq5GRVZVDBo

5GEyEKIk94nD0zXlGZn02YHjDv96oSNQ2A1eg6vXkE
jhLaTP/G5X3YB8skTM9lLSIlpfG+ClQMrA+TZsbZoSW6sktT/uFNPqZUZXcsPSNH//58KjY/Rx2O2/h2

n/5nWeBPoiOpz2NqcBfE9uTcMYtkZ7HCRHQT5HXypACZx6YRQ5JiHvgLACWTewuQtpnGeIasXJ0iwagT

4Y0FUmgXiuxOpPm6KY0zcKoJyQPY5VAAGdG+RwnVIh
K8yaf78E7yEDjNzo52UHHYMDe6eyHTB5PBdIPXzNGEk33iCZnWDGJr9pH/nTj5VBVbF/3y5qM6M0aPJ8

4SjyhX0ZG1LOL9zXGwqH0x7gvd6tXVR7tIvPUD8W0BvXOejKVCwKd9AL/gkYVzQs3dRV8HrmwfqK86zp

9qelKdKjluCPOs/FEgM14ZOjC+m6lHNetlLRAgqfh5
e5QE3Vc/BLccgUPukV3kUiyqUYxv1BI8AbyY2GNjUBO4Z4MLDlUvw6n1AexCRVsliHn90LZAGDusObI0

pci+cPT3cJPm88n1b+0rS3qYYvRVpXNwdZaBXycs19bl3oedVVXV6Rc5mVguxqM6A6lYsSO2XfovH6tV

Do+2VzQkFTp0kgYAz1UNGkoKJJcK9cgoPH+pO3o4ns
c8y4bSh0jLKINK0DKGaDi6maNAxlhZ7rqoj7K2dZRbq5wkM0fjQA0mppR0kdr4yXmzzzHasrZOdZwgKL

t3hPvNGGapo+KrVQ10MH/atxmr7b9fTpElTBcGrpLbLnULn7aV3VvoHHnbC+8+Pv3AtTyXbjoPftNA4l

obrHjvNUQ+VHqSLBTFGLexf6upyxwpUWd38MDzKUFW
jt547B8qSjF5xMgXo1r9mPvLXzVPoXcEtvx+unJ6ptCPMoL3EpUKK5ir9dB7H2Fh3ma5fHkExKasqQsF

3dS6kcmhNm4EPpyGj6UVcfSNW6b7OtMyBUQqZkegxwLS1tvDIQrLNpkgr8ByEHP7KdDo6iXRT5C5QuX1

L0ogyqorIgYJbICzFkS/y5zkOjjBp2E5r8UHBG+JQR
o9udL6O5AxeKNlGjomh2FOTPkbyQjsL+BUFZMfA0If83Klk5UeT7yMhv8rL2j85m309pH/FZ5Sge2s42

Ln7i7iOU7DeKNSP0XUh3nd1Sqw7jUp3zMbR6K+0DxsWF/xrjomRGAim7Tw+lmjn3x3fJwEgb3Wly6s/X

ol8j2PizS1my6yS/5duCxN/SzAupCm6ogU/h/Tcstv
YYlDh/Po84kvE6nEw4gLZqOjNGtYoqeS8o7wXQDDwK4EEbfLXww04/Fs6IL837cA04mwCAU/WgQrZS31

a/CdrVxAypv81Alkyi5DafOoIvwTVhGtg1/W3Ea5UU3ttven2NsENet8wk+iIxfwSQMV9CFkEyXJPS6R

xiKcBaGsqVFRwTZFM0iAQ91wtljm65u/SJZYKBAnqv
s0DRyAP4yZhvL4VCwjZzRPdr3LsdBwRcnbgjyy+tFk0ghEVWN5ANhIEczOZ3cz3Puoi8BYUYl8oPHRpn

j1IgZdrzyItfNHSbuM+zK4tmX1ytUA6CHA6L+tQbB6Ov/7H/2H/sP/Yf+4/3S1qmpbt/rNSiJZMlgWFv

1gG4zrvP1v+LJczAHNrY7PREMuZ0K9SI018DG+qq5R
8NAXY6qae7M0UJG1/tCue2GwsgdkzyvD2Ly4Rkfi1Io92ciATqQPMoXSpD8u+Ze75rT9o3liyzgvh/XA

BHOa9xidG//xDFKxTJf4nBx7d5F3frGu8Y8S+pTteXeZUblplxpavw9Nm9mgTh4xtyBc/AT7H8htcTkk

GgIgyopWOOjruDPVMzmXLtAyiKmgwrdve7cfankMNw
kaKxrNozhorGVAnGsNYXHwLjyuhTrwhfqJBStZoR3zU6BCMKlAT9wjnFE6nQFB37h8NrT1rwPAh5q35N

v1S4zGnn59i5Hi95MQkcsA74eRCGx++FVD4FdgSEuA6X/KwmuayLCq8U3krA9iQpEltcGghCO2GMgNAu

Sl9tcLB79D01FFwCzN4th3DFf8VP+7SSGtYB8g3JpX
UaKE+8SR+HJc4GjqW447giTnoSUWRUaJtDw6aaE0TdlDnCcxkAHDZAFGy127nf5egP22Oo5jtrDotTzT

VtnEqtSdbEhgItBaGM4USvkBnPNPLNgL5Cil1qf3j/u8s0KJo1bEiTmHSWQXuwv/9QqgP+AqRaYF4liC

USviwPWvIRXwWHdbyk/qZbvZsUpV/QrHtWsgfP4Oix
1mcYSl5Lh3y7Xs4wGHIPYPzyGAL409Fr0BNy0zmqqwkhTghzDWCnfowPONHfGaqnZBVkaI+ekRyjJYzx

epwBq0rdB3JKckn7GuOz9sNnrscf7ZMftJ4ROESlp7O4ytOfSsA3sbrnvAprcHbHOoK/5ja+Nn/LRiMS

ueZHvw+MrXJdK+ajcSAOG9Bsb+/Tq7QlTfzf8rIqbA
6HsCjrvajRdgA210LLkWd3VEEwHM7sJhZRwRlf1+Ia3p5el/CNDDmKvWqWXNoij8isrmU4lSknzW8LRk

b0omwqtPtS3Ae5V1KMDOuWKdjE9aWdZIW2CCaqF+QESFbNjfnupsVDV5DFsDBJ2KZ84T4SZ5KrtOFZbc

EOvpHuiqVBgVYYs5x5zTCkkjYWRP7okex/yKL8oK1k
w8AdjPjP69YX0+1WefMeaTBpl1lkF0cu8/uZKMNgrJw2ZLxUlRl81xfwE6wYSvHGNgvqvY9WOeKawqSo

DhMvb9nr1oh5yJpQrTer0/qr5EV5ZX9P8Cr73tJ0Sd6TVYnZaAKXL1XUzF1WmUa/mqwFFETO3bkQIFHw

5YHUqbqtb98eZa9q+eFzPebu2BnGKhvHLy70gHIC41
nQNsYvRXalxI20wfS029bk37yW80TNbP+NG02y58NOJ7eNqOCBQUQic+fe1IADdlw1dFOIQOex4h0RAj

tR60QYQlDDErj4d7y1frznkJx3XZEDx3gKt7xhJLFMbGcQtqP98/u8BQ2H4nrBKRWaZtWQgpboJUGGVw

URdRD2xFdBceiWhEjHUTNJxt8AIifDK180MY7WXg0T
eSgNpdl6yqiZVDBVo/eEK2k7s7EoUZm3ujEcdp1iQvVjRU8DjMpYan0cexIZlsvGAXYcQwQFPBQM3dhs

+qA8XQGQeAmOci7fIRuAeb6RPvFcJGCcfZVjm6E7QsF2OL78/cP1TDCKlmldNstcO2TmREoHzX0nON+O

mTx/V16LvaTyC0qFj8tynGfoa8Izblb4aqZ4y+OvBH
Oh8hOiRY5JLifKXFUxGrCBTKa9q/wx+bcqdTMdbHt+dILlF+tiKsshL9OO7jWTOkS2vN5P4/ySfXoToC

wsxxye/JRKGa6SDJckNSw1YR+ZhDy3mndBgpoPAKYv+Jn2hs9bZQ3ict2UFd48nr3Z+J5MI9H/peF4aE

B33SymUGvUeLVcPj8V/icYempx8PuIceioVIaASIjP
6jTMTgMkGif4Duqu5JFJbYaAPu6M9R36f6Hc+J9xGVt9T0UZJrTZNmzX6EhwyQVCMqx1bEYnYJcGITLB

4fgRIqsrrkv8V5ucnGWDzbcnHZY7asTDnRQGI0F3oQwYpPrIdC9BZuwtTaZNpAk/XNAYh8qsPx7Eijhz

w8EyXQZdm2vrqc2XZJg9a8I/OU4IIDloAjDFbRgqFe
FwrT7WgdE3ky1Uth4RumCEKDSCga7IfTzRC/bX3C+93toLwLs9rggTDz2rG8J+IM3vmUfhhJ/jNP6yQP

h9UODU8hrL0tTjiFyEZhfvNjrlRNVPUBPRejRsNdaXU5t64DTpsprGF7s7lBaHPK0DKqucxq+bWiIW0h

oN446Mx0Q8Sdfn3ohXoUh7P8FY5qmoEM8kBh/QCfOb
CWgyQEAmcN3K79a92ftyZcXQGm2IMz7gwFKF+0kDxiD+ZWhUlW9G8dZGlKO4anw1XvQ3D7ae3yOIhxfg

gMYFLV1cZrviGIwEyq6HzKOOSuaIF1R5MjlJqccE9epEwpLXY9kClrwNoCU7vMGX/xNiH9vGyH59836J

8agBtFpF8mlKHr/W/ZYCIkVpXBu2nuBbshIzL5uNW4
OrtEpmtYrcBCSBB/Yl2RsSLmCQdog7SCMgqM2/OOJY6oizzorQmrVhw6xKM0Mc4k2AI8Qqu5UOcDKWDB

app5WY5gdH8CL7SU/yvWhuQugU2BeLattLjvZQGwxBsbC0O0Cpvvc6LzApOwUnxXeEhR3e9v48MKJrXg

bq7G4BgmG7JGHzpa6ra/LFq106n5S2o2vccrhoZuOY
6lZ71dPi/O6h6I0GoVGJWfjL1QeYbgXktVgTSWOWdmU7CeQIIfCTHyoqrDxdw7iWapQ/DiK9Lo+N1PrZ

1wi5wNBImvPCu7z+WOVb4lzqOAMbyvaegFhQTaMpTlsWNptkEmxrnEqgzhXFFt0Qb0zfVEJRFaVm4kv+

AYlkJWWCli0fwKafQPIayENwHmfhtcUd80ZleR1Jcp
hg2lDA738VXEfJNpQgPCqL8jvkDrjFbNT03BD/SkLrhLzDB9lcad1Sovubtabv+Gi/o5zEyxyH92/+K/

cH0ZLsrfH/Ih8F+Yt00ntVbRIMS6/RYiXUbBhvTXqzfEH7rbNgmfaQZv2XLpNvWZ9k+qavmNRK5v03p/

zmDsUzhAlFOFRONniQdFEXnQzYLoctYMSm9J0lRxzu
AnWZ1dYcp4gAvffgUDDST7EzPT3mEkWKxQmgnekBwp4Os+Dm3ZsfGNun9GPDC1p335pr986tn1/noECq

D0cS5W9ICIBgRVmdG/1rqm54KukMUlEyexUKrGKT5mB5dHjly1hzfP3SojVDlYwRAxiSj79dXCC83Ryi

03kXUmAjyGEeSmOeorilLki+NCnnhdO+UnXFZizxmJ
utJnck+pK1pDDwH1ek8K4cYZvJSA7cQh8wQ3/FdPfFZU/5ZLIkJVcaeLbcvjSOJnGcG0lAz2S8s/wCZi

JGRY3YzQxkBc4fih1umKKLsgiPsuPqnpa2BhMpo3NHIGvFZ8aOMKAcb/0tG8kC6iSXziFJl/syK9zOVg

D5PtwO33Ij3olXatL9I2YXmLYwhUOFmTSSEaM8Iv2F
0vDB/EeRD6wtrwVXztG5aSlhrPXkvN6kQjKDEsuQRUJhKc9ppV9n1Nk1n0ctn1UxrgujMFlXyEhKFbSM

MOdGAOhLfNXQVBif927xJoZKMp9PIYELsu/cMD5Z3gmgp704mEf3guyzuEicl/4g07Gqdt7ZB6fz1hgY

KgX6wvQQDOV+YKmNT64JrNkPpRwrgKkUjKsMw2R8ky
9xJDUcokj9LclyS3LRbVt0hpGabtPU04mdK3MQ4UpjrlNMT9nKgj2Aq2bf9Rap/LsUfKePxlrO2M2stQ

SU8UhdUMYHNsflnmTpsHr2etyo2wGWw6ZBdVhiz6drnmbbe/v1wRrsgA/j7VDr/JJIxr/E7cQC22sBsW

UT+nx+u1+JOIT/cjdj2r+26Mj+absQNVv9SBZqDWj5
lrB4ySvsLOPNu1keCCl1UXcONKIgTXGLEkQl5fmkopcE9Ur4iEiE/qvoBRLMtmEg1gN1CNaGlNAPP+i7

Eu8QBAQ9tKB1GNuqkR1yR+hld9Gy0tHBlcmnenYojMjhUl01gBXwwJkzr9R/XiDsPMcY0F0UsWpzC3vl

XAH+AvR5hW1sJdYcOPMJ9DG6o0Eb9s1NZ+dfN3tD87
A9k5HpTl5UD7k8I0kf7Bb1MXzgqWdNKFg4pjT/jLTf2/nsdh2F9cwZo7QWOMgR3jcGOVdjfkBdHJNDBd

3e+s3b/m4sHu9watoj9dqQcYSFxdW/9wTiT3kedA/TIM5TIvVMxEBc4DQ7s8xS606BIBWvjIcsNcha5O

Jjqa/xx1TzOPzwuC/Ul06T2TvSwLRC+JYtJwjUbllm
6qiouJBFs/il0sdYiKr3E/Wr/qAhcvb1i+iUocuih/uCRZgstzcJo1PtV87W5saUmn3zPTDyUH+iftV8

4BcnfBEsPC878YCi+6btm/cbVJQS+LlkWAgsU+CGxZhOoCVdDuPZ62EIz7ksxr880ixfkqP8+cXzt+4v

EyECglCBG0JBoDop/3JBY/K8G/aKt8dSzt0/WIVwyO
626jXNbgePP/HzDaPGyAEZzpIFivHfFCBFyL2zIcYGkbzIMpkdL3iNYwwDmSsd18TWjzhrSu3SmuADoe

yB7RpL4DzND9uUY6JdlBvUTtSRgCugXDvnjKz6sH+k7AFkzuhZojNmxEsHIUL5t9tV3BON7vcsX3srp7

/EHVkl/dwUiTH0bb5kbXiV0iU+oYlKwA+L5Nxn7urL
fOUao4pB/t7DRhxM+1FSPfcJQQp8S0KaDY9cMQLZqxjF3JKzIBW5ngQ4OvpWayQxwqrut17OvrD7B6c2

kATE9J7fn6g0YJC8iJ/EzdGZ0PoF3X3pmN6zVJtM5teJ2KGh/uN5kvsh7GykcyY87PodH8gdZmVQCvoP

IIudZR8Sg6DvFAy2yB6SWP1RLySe5s/8Hh9xwS4KQo
nZz94q04VNxzpt2chIULEttRNfVvLba+h6TEB479wJ9DZCs4Jd6objbg0bPaj4cdH18r3ymcJ42tFy+A

R38sN3LPhcrw78/2DLwVpmFNNBx5cShIgLQuT7gB+1c1XxXwQRi8AckTObOMj5MasXXHhLEpI9Z16Zws

+LIFuL9ldd82AggLcCkdSgWr3981cnb9rEX3cddpVe
u3fVtsoFeYnRA1QU2WGaiL8fz4/PAa5WhjI60AvqWv7hd7T0O5ewr8rXUqk3ztGWXnnRyaoBZBu01CR7

2tFVZs/wq0vjk8sip9YHiQ7AcUjQmvqNPyc0Y3KCDeqAxS3224JJLUcjm0Ca6a6dLhwNslOhuvXpMre0

kgUAs1aWkbkfoy/54+/TnI4EXYcDHrdxqnrnIgnsXS
esj06u249EILnqH7ndcGfToa9nUHO6uZNdnW6N93LStF3TOQEfmImpfcr3GlEtFSp5iZpqjgaiNhV/SO

v4LTXKKuMdaKeNGWGpK7WaEY6mhEx7Zqq5NfSAzX9ewUsiiTztzXBXR4FAlwaow3hH6rpFbcu+aHlgM0

eLUdRbTXNc7xHMqO6jAs3QFYlADEki484HMBGtWJZ5
Y6SNztdb08wfdxslj4tiK7pbxf6TofBEzJMhZ9t3I8f240z3DtYcmIuevEpRf1sZ1oWhfsnV4VJ+ayrY

z60XjYJECY4HXOLRGOdog7JBKoBEP7wAd8SRQDLpo3dH5XEsWu/iUxTYQwwZnLyj9tiAu4WOERjSfV55

eReGSEmCFQM8VHG0dA9IVE3aCuJjtHY+COs6d9sWaz
YJIkuDg3CpC2FAUk9eBO/sQvjHiDpqm5md+E9jEW7EtCTlLmJIpCEKjYr4kS12hXoeqsWx+tUZu3ScwM

YcMJAfYcDeBk0oJuBrj9bkQfH4d9Ak0/JvB4tU1ppsR8npM2qYprsXOWt/2c3oFRAGzDQWzWdomv/qxd

wheqMIy+sQ8EmRQhmmYl0nUJZGjDgaRO5gAcGhoxH6
kFusnT6uwaUVzyTDpdATOJb9BMsR6APvlv6pljtXhRMpM5ceB17O0hY/31qXMe1/0f6plvKcrbfVoJXG

lYq7J5pHuyZeUX2T+mVtbM8CwARAPA7HEXAZSGO7nRAoKVcl05KqRsHw/F3XxxvXUf5F7JjIvMJ96B6Z

M/7Jx/WHxHj6iYsxCuFB3gqzUArhVz7++Km0yQ/nW8
HuLDD5KnRRSRgIpqlwsLcRJbx/gbInDKKR0uP0GarIwTFCZHQPaG1R8DYanVWjiN7/hicJBkKdpOAM3e

zjH8tXyFZ/k9wCz0h/Bi0OatOOscn8A/Wj6NV+9SnOLph5i3/a6nwZLEcllpnixm09Awq3rmiBvJXtHT

mljbj/MfBtpZz0Ddxwj1j4a0xMPARODUspfjakQGcJ
L+MNit2gzbNytAZyY/xiK4Q5REgdKCJPS/74U6r0C5PZta7UZNMMLtJDyLPG5AYq3or1gdPGXiBz2W7d

DENwhlZPf5G4ZfYIITN3gU6XRpN0dlQTJQuh7LDMPiNhTPzuX6ifBhjT9SNzgpDBP34MDjbDK//YXECf

zZDLYNUInIIV3b2Y78xTVBWMZ+Rhd9UN5AmgSQGy3V
KDzpOZOoMvgnj24aVb5GZaMlFQbemD3Cw8qvBaD7FB7o3YT/qHweLGpldgW3RhMdQhgdor9RghAeQ/QS

L5yebJeo/4ZSSVstlmX1AE90942t0ffgCUI3R07A12W+ZsybxLh9mvWjA8W+NoYS1cBwiPYqTj/SzZEa

YjgF3fCqlRzKpVdDih3qC8lOox7/xvRySKSOdQzE2d
cP+XAq0GylZYUIn3hUzpwpAhcippNJQZstAbm5efQPQKMuE0GA95DJrAyLL7dtTwm84OU5MH18vBsoZG

l/WPnof8gi861LBx3F2YuwwG33uT86oglEA4EeG+fSQ7+5hHtFJeAbZ8bHl/SaiCQtxnEjwxeT34OudA

PUAWN9hJM6rp9MLMhRPtu8wBPyS0k494mrzg7AyQ8q
gmpKb36hTmJbiXCVDKx4cQrCZqOS0iy/j2did/P3TMSNx4oBVcUP4LryJ964wyC6BlOxyHjqMT1aUuz7

1IiJUU8r1eKMGmx1sLEUkr9qswP9sF5AXawGDtBBLbxryj8JF5h5Ye0qo6gHjypf7bJU346NOCI+//XK

xl6SOjGb4f9un3No5av7pypmKQ3QSXs3X23uGkU+cG
PgjuEYmxv7Yd3si9EfLATQlzaPADXW0dhX+LW2oQvWwjDWa43wCF7iEilwsAVkhjOGsBMJ7lQtJByEK7

TSYqz0nk9AhkwIdjQGpc3vU3HaBNNfjFIZYpjTOPpvO2YYBCP1QvDU4c0ZSP6TLlVhXgISiYr/obzQSt

VoQ++t5uwFkABqArpXYG/IJxhUR8LGiCOuFHR8Tmkf
Q1XLqyUhGWqYTnQVbQBZatU2sbPv3x0agJnHa3xOPseIMmJaJYSXnnC7j6VCeQr3ojjIDKzmOIPv1CpH

ANZ9qZCJ3mhX2qYUI7D320izx/0KOOZ602BWr4o/K6Q/RFOx2xHtGPtOHSxOzDqw3h7gv+Sw58IcY67x

uIg+ZuS0uazVa4rw7DOM1xs4OUySzYnGbuy/Vs0rIK
E4DDw84aBoDDMGny6qH0vu48cttfAC3sAy/2DFLp5zhkjNPzPph/BSHuWu7oxO4s7NHa6B9Ii7pLI1iX

YUY/YbfjOpL7etAsMz93nnTtCns0Dw/D4FxjhGYAn3Hs4QREDG9W/ikg+2j1RpKroqDAKip2BVe+RyWU

kv7wAlBYUjX5ySAG+qy+FxLqllq7KMxtKQkPpa4UGc
WWykE/LIeycucA1EX4Mb0xxyvNxGR2Zqry/Ub1QlGqUzjVylZw6jpxwWpztLv7bB9OH4KFHi4c6u/0V4

Pbr7sqSOIypRx4DSLP/m6wqd5krrBhdiCZhNnKdKgMYwQIooMCwjyZt49ft9+cc4k8QuZ7hx6NvnlCU7

QclwE/mTNQpmtaoOv5DZXvI+W2TrXqcwoALdfiwOS6
28Ddt1I8eKmGOKRk9N/f+0CXx+WV+SVfIzyIEXpTe7cNAjGWyEaxiYj6IpkOJH2OCwqnv/XFF0D6ce1h

LtzwNJWzoW4Mlb3gbf5yxJIB7tM53gUUjm0+r6hJAUDPBuYiHoqQNjs98b+C6sY46vfYS4suapkLxC2k

JOeQmCOUEKgkl0Um2Gb70QHEgU7ckfHZJr2rzRjZU4
2f9wsE/prA247u7D5ERDqlKW7e04x2fPtUVJDVMM5y0/03hzuy3VgSGu+ZlRvP+dj9wpgQpOXIQklsGX

gkFlmF37ItVkNH2x40E5nvpq5rT3CQCNYciVsu3aYXD+uqjzvml244YelBwKWX7y0bA+ACkdp0TnijH7

Bhd1hVUq8XZpdaS0TWpI8ps6ZHvG+CBvfmh+YzBs9u
NocV1XREk7hyhkY+4kPkvYaqikcqP0q617oVOgI2J1dLTuC0WSV9n4zJf+SMrAVYf7AFIxEgNS/mwLYm

gvykrt/p1SWPOnZt7Ugt+kHILFI3mMBalYKz8L4tewK1AIL/Os2GyYThX7nHOu2fq9si/tErTyOKmzK9

zPxdYCM1fuxPK3eQbWyEO1/dCPzz2/GXwwJecbuB//
nU5Byv5hJJvd/EaUa++0rG/+oCe5G8OVPzPek1mjI0PoXb+5EiCCgHvNHAHvmVuc24N0mmyiKd0R7c2G

MkucaalE9BDgGGf7vbnOB7iI+Nloa6wZ80U7u/5Z/3ptv8lufUw/PWqxiduXjfY/fF45eLP5Vqj7QHcH

DD9+Qp06zDe/S73Lb0u/SO7bCHi/FW7PlTPiQKDeHe
R+RDXvuw/sUunf2fXPXyQTVI3hd0731Yx1gD/F2ItRz0G4JbhOSAQVz1/aQKvSUvTB7VHK2yy3PfVKLm

YDXmJD3giu15AHVgb4MtTBr7C3I8dOVcH2MeN7IsBPAsvQXWf8umVaScCUK3HSDsNnhyNS9MCXYqkNHd

GXDwYBgdEJ2vmqDxeSM3GY6GcTwhOZouGUSDEs0+Pi
3NHRAkdtKcIjTbQDVbK62zsSAsvLFfOqUmAIVpHh1+Ge8vomCyBgmORvUhSD4egf59UB7PUT1jiLtdZv

E+ZgG5mnDvqX9FkCp8YAEtELOtNNg3UbEkAOEgN40OB4djUyYbPJ0QWG3GWJ7hr8RkUDDpFNXhRN5vur

26YOUbb7BmGBe4N8lmuhc0zZHsUfAtSXJhZsSzIXNs
WA7gMw8GtDQ0pOHqTV1UlDOwLO1OoYVeVT8MS1YfSMT1Q1YxtGCblvYsF9QvSPDpVNXkw3OlRX0MZ0XO

SDOaGLGiG2GlsZ4zI6ZbU7RkZ7EehbqrCHBLZp0SjRH5cBYkLQClK0fsmAahvFCaWEemG2BzvDVSJQFN

t25me42lcbZdDP9+m3HhCOJuDNd97fy6COzSNBz3Up
UQhyQoMYwVL0bGcHHI4t4LHjHa6GTtPSLbuRFUX2hxhEISMnME7CWEJaU/fjsP/oO99/Fq5BIqWgb5zK

a13nW/z7Oe+37W/c/7HPRLcHWvotPta7ILwPZTD5VbPmuDWjPqWayCZ+RUzAysLMwMTExsnALcbI/5OJ

gWiug740puVYobdagiMNudGGznQvi7xaVnVm9pvWMO
9OAR2ovpW8AN/99h978HLtUYVtznKZlSBAkXHGcUbSpRKFxIhEJZ6V1Z+G9GHgVHSx6+AeEDIuK/C1oe

ThNzdOKxgYVe6EEw/84G/q0C2BRvUgJWEdA/ievB3AmDWvl6qKaZ99YzR5dvhPYkgTx6qAkMJYX20Xav

K5plSfYDVBNAeQMVEjhoyUJ5Wn4Wxw4lnmNkknBU7V
xzSpz9Fk6ST48fzS8g+MeIyKjomNjUtE/tZVeal3DVH8gEuhjaz1w/HUA8cUjt3sa4ZdmKkhv+jX6fmZ

2bhy/3KSuzNV8ghr/f9u2fyN/WRo5rycV+X//jhQXAwfo/xv+HK7HeTgi3rKy8AE34OOW1/FtAgYvHLo

WPdnbGbSQy5ZMqGOOjfzdBtW3xoJhzm8pln69A4XUC
bnCh/1H7D7P/Z8TC/j8x+z+J/V+8lgAkOFh/x8xNTXSP/Lp+WhxC9F/8F//Ff/Ff/Bf/xf8/aDRy6XD7

6dmE2gKxCd/IJ4EuWXOddCUkykzZlNJn6jYnl58nQOu5JNgOgwtketD4YQc4yZbgtTSsAbH/0O8cOLnL

pY6uCLwd/ofvqeR0AB1GyTMaM29SYmqaKkxQIcvWrn
k651W7dCCwQK5w3gf+Nz02HQT/xnb7i8ddfQ5Sv3ohOaisznrD8tsyRo5SGx05U80WHJY/AA1jBqop2m

pAACFgKWFTcPncCosdApY46oWuDkabSSeU8sPuuAaSF9WiPcKUwRIvN7F4RI4ucHEqduWqM7yGUJighx

GZ710UeLAUzBpZ9Tv2C7HIBzP8Do1ddNNZSVDPxebu
MAIbWOm5S6Ncz/9X7WUuTIQ1Ef50h0upXFZrpLMXcdslAwK2VdVLGLVED/qkAbaic5sSvYCtijMt6+XL

r5KFxJ/krfOkeYFWl8l6dQmjsNM94QLFowQn826mUnXlMdnOC7cbYSewwhOyeJFUMbIw7cLY51zxd3ij

4stq4A2wjP6yPT1R6BoJS7IU74FqlkbeHx1ysBjSPX
cX90L8WP/BVjTw9yDZdaoK+RefkfLNr4mv6UZTGsqh+2QFakC2roDGoVjSH/Xo2kH1Tpn4N0Qh9RMvhL

7uBthpp9/3gM7c+ZuQ70ZWwqJu1azwcOqPo1U0e/Itfvo8Zud14FB+eJyREcPh7WDakmw4bJ2X3Ysgjq

aTfhXu9KXOo49VRsIp+oxuKFIdJrKnVtBGuI3DopEP
wZ5jOeyyrxJOCokcZs3bvAg+50gtteh6JnON48gB19C+Kyo4LC6eQa1rcx5/xg4cSX/888OHmIFnLINu

prsRsmAXpFINXxxeVePchy+IOdVLT6QDAo30/Wq0vUZ25bxAZRzsQ9P84UoPrhdc5bm9LQ6TlaowWTD8

u1r0X5HZPBuuba99jK6goGDI0zuLv99tDDwfbfaajj
FiVwcVq34cKbqxWWRxRNHAfOCo3g/6wmTR2mFZcaFiNIDzkii63Qo9J8At1FPvq7u6Gb36Qe9cM+Ornp

b6xxe6NOprVsB8xvO2EKUozdYH/E4MGrUYPslaR1dXyO9BwG0t21xAR5C26Onyu3bOWM4LTw9j5y2vyh

p5cJAeZ9Qg9Kx4W+BGv4TfS/n9L2D3IHJhIx9fDvHQ
T24lrfqX+VOuB1StgHNEm613NadnNrgfixdDAKFvQ5spITqJsEy8iXwP+CH4apk0anZrRuYE9b3yZJRl

0JBBf0JrwVPfgVF4ghU0j+8n64xh20aHcLzf0EuSRuUQLsFfYo1ZlUF9VmvJrEYCoghpCyeKkb/+JaUe

mP/rkGcJKGJDe4ku8Phl5ENClkxj1JWG/MqK+aqSE6
SrzDfmLpmETzQ0pW1MIoRjcPk6xOkto7/2JaFekvlA4/nviB9T0CHnk7EBoRR/Y2Z12vTm4xo66Lbiv9

zU2OXyME9qMFOeT+pj2Jp904aTz6qvijFiQwlIbxadH+SvaCD9ICOb5oMfVGK1Z/7KihT6A4CuGZXOWA

lVFdKW8Bz0PONZ3nkrkzFz/+UqR2lgTah1fRZoUSc1
Cp8xx2yHpGL/jdnHs7ABSdpuUrHUWObykxJaUo1XOJ0aIEKz6tGbH7LoQsMMI4ui2OgCCm2xIIJLxLZD

YvzvBQdSAsoxnfdSmVKJJ1EqqWJH0ok3ueOwxdaA1rUtZxGVAZwM2g2umAm8rOHwHQTTG+oeRh7uwGHK

/bNZc4Sfa4QIqy69UyDHaqKqAqqIIfQoJmaQsSchN1
So8FRJms/rYkcfIHUd9yLUf49D2ma3AFRZFZnL9LaPo+IOK/eHpZO0a8G9EY4WQLzsaAqqZo945lDzrl

mnOmjFjEIj/No8jzRKJWvoe3OAxiToby8U1ZJIpkVzlJjH3jqR5CHeyGnqs9VGHBcb5/pGnFr6XNS68n

loYhR3EUtncDsaCx1loVxKjIR4hZp+qw+095eLTcf5
5/VIJe4YAOTIWE5sIEQ472OwEbM97xEZNijiLwM5Ms0LgrCGvlRrS4oXWvVW1SPEUVzAbNed0h0cmyr2

i9Y3dOhf3dnMKHqmTCEYFNXImj69R6pW3ZJ7cq/jlipURy3urwCLqpYSjUQ9ab7ymoIOB9rt2OAFKJ9Z

GyeDcaoOclecGBdCiKdufrHUfyhlQTR3cGosEDVNnP
rW+kc3AmIhfl51uuTJykxKWAfHWd8KXHz7HAioYLHvPBE0GGOALcP5IKAcvI/cPQOhoExv2Xibz+mP0t

uf1xPjIeCLX662vGG1fkt13YtWJDeWqehm2/igZRbdXI1Y56As9dC1T3EcJxAC3kiKRqW65WVqbfJnVZ

CQaiRDclFvS0/AtShROJAAquRf+XO/3zg0L7NWO24U
JE/css6ZIfDz6A2penFpsFXvzMjLNsS4pq66FhYKUH/B+jXFe1Lg76/honF2iQauuJK25PrAOq7kTLc/

y6AEDv717iCAotAtbzek7dQNInTy58yBM205Aw0JB6PV45Xps5zVo0piAX8DgDEBWk2w+Nvc+xDN8DqM

UhbMYwiKZpFXSJBXaV7WxDcIWv0QBiV5PLCXNRjxeF
L8EcLqF52JAHE13+WuLKp+ABg9Uee8QeubEK8R+p0c0tmt0W3+Y33IzRzF2helbr1hvlOwprh7VrTJeS

Uhm3YeFC3fMz0DpOcu+tkVYehxgl5xrywAHkFrvdYSDjvUmxoOeRC/T/LGjinhvjZaxUUh0PN8imDErR

4rdtphjT1v8uNV/1EcLWk/F0uUk26kt0+tWVFMO+1r
KLVkyYhfOYCMl6T4z3M39WxZczH8lVQmLgQvHm4z8HsRTI/yn2xlae9b+C/pjYF8NnAKvrnVh73f7SJs

bWV54HGnvfd13ODRbCK9vq3vQzTsfQy41GbVQO2IEXqfRS8v2QclRHbkaE7JOvFBdgf6pFtUZNrtfSNI

acaDD5VehpwPYdomRN5o/KpIzXetjmhnSk6cvdt+0E
caWPruhBNcLQC2G+dxlVpHikfgANDkkDMVmpCnK2UPW2Kj0BRBBJ9lv1SuPUOOU+viJl+9jk94VWYv/N

N0+L+v/LZDlu0j6k+VQTadOju320sD6Isi8xuC91fCDbyZQ03c3x2DY6H/3gUj4bclUfA4omXjeUqPw9

Yg7XyMXixUa0d4qqrXkXpSMmqGIt/0qsyy/LCAF5uC
FJJraa5D/cmwXD9wRM/6l9Bgi2g8y46h/H1gjgIewllcteaw5J1RuRRZBLarcy1HfoiXmOW0qR3i83j1

UiyxLQT3NcY1gbcBNHW3uHqxUc1e4AVfErZj3anBVatFMOe9xY2VbtvcPjPbQ+4nZMZxuTK16LVcL9WI

4zGObKmF4oJrDyNmm1YHgfaQJrfnKnZEYfVXKKl2ho
ByInC/I9oUF7xxmJnmALifOCvdu2ZjBf6xXOI3h1IxApu47fQcc+ULsjr6DIJrlk1z2AHWEDzkQV/qb4

eigIK1b1fYh2zHxxOXZEPJ6N4xnSLZ05hjR8dwr4ulSE9+tC3zHJm23c7/JQQwV81xmU2zm44IVFP30j

bhwfGaedotA12PDjTGwhj2amqsckIf7yjCCM7gdKxP
roClzcqQRisO+s2f6jBBgKl9c/sR3E+lw5rxNmTy3j+9/khZg3CEobQd2UnEJR9uIiKf47cqJ+lLDcyr

kysrtzGC5GwbcRVfsa9BnJPaDoyLVtv1RcJjf74i9jbbcBWtnMxoiHOWblbcBpZDuRCXsEn3Jq8U9+DQ

+OAa3Mo7rIGptFbsHKrMNGQRecoqznPhziB6Zrum/Z
a1IGI7RRYsGZEZULGhVcwbK/iN1z9iGthmGF4KTbVUWA7yHggkC0dwlmqzPFeHKfyOwDG+09xlIpOnOc

HVRevQL74yLr883H10FnizX2Yx5GLXQj8IRH/E6hTYpsbTkRwtz6v180CSv4b7xsmfuSJl0QUZKuqQqZ

eGA6cQQwgAm75TfjbZXr+MUo30RlHqO029p03jFWio
XZrAUlBtZNYR/8D5ydVwiwrIx4Wa54B7rrBP8h2LQJVpiwzj/+OgU1NNDam9L4lNTu+fE8EXgM4XG+Tb

dxFLhUY7lUUsjoKzTi8VDGewGZFoS64JM0KZBJV62h1Y5dHb9I1+LhOuRYxVVBoY0C4CqL1OBryswgCl

xnlQf86r91WjUHSZtGAfIqiWpc5Zlw91I/Sage+8B1h
3UqcZv1yG1jfKT8R0FRuXRhz9KWMHrioIH0v3QSzDgXnzyh9Y8j+aMuioXh/WoDTIkT/WbzZRGGsANhp

ioNzMVjZ/DDD0GkLjwSMF+3ZxBxgnJJmKf/r33/2tA/hi1nvoGGN89L2oetWhlkWdTbftMehIs+Vw4TC

VRofMWERoY6lKNY/GJr5jOHoIJuQiJ7Yvw1Nc6yNDR
0ej9UfOh0WF37O6q7neNLl34f2wIx0qVeVJP1mdBk5Jk6OmW6DHsy6l2S0BvCZGVDuPg3+njLJvOFEWb

VC0f+emRoPxV/8vpaSQXGf4qpjxFRpK/S3obLzIBDNW3xP8PwH883OQym4Gj+XN4FsclV6T2W3G0LWBb

iL4bnucFd6NlBCoDiuxph/WgMbkPR9E+3uK2PNH+Nc
EIT6OLB+dOTkX6E0Te9p1MUyOyRYBzQxwA/LYaDt9P5rCwPnC9vTmjmMXjuc+Qx9pg2DrC7/URvcJ+Af

GG8b40dMDKpZeZ1441gl/2HI1/X+pSHY8WcMtWwg/tiPIPEQkfbYnZMfWptacpbMfE05KzOWFyhj4H65

jDtwktOBupwWpsWveiq4xBf6xFyV0YRGDzGdcXTXvm
5xH46JlDOYUQBd3dM8mocK3ukHqUTzTuSw+6q+kT0H1OXI+VBTmlLcC8KDu267hGIqKyHXjZzvROx0nj

IRJmIxA3NVUUg6mTSw20CG0v/ZKNE4pFeIfI7YEGrgUJ9fQL3XO+55ToEfxw5Tdn6UCz2x2z9pq9UswB

YaiOa5zx5IXjVl28mIrKXLRpc7VFsLHvU/wr1E0EW4
2ENjGI5U42cnNKUhzDYeoNwwDFzlgHaj4tMj1MZzyXWbzhx+n9qJRDrwpsaKzuKHzNXyzuJRNK3/R9Ym

/wC1/E8agW1W9NDY6YRRjIf3+mJAjfPO11SPJ1eXMG6qRC5l/updgKpulvZFFpy/gnomGVr4Gr7E1Sxk

9DXfKlZHwiOjDuiFA5Z5dRpeqacpeb30ouMTE+izCa
PRBPO5uL578hd6EUNxn4GIyP2eGvWOG+fRWuCoz78fVJ4v+NL1VX0YrW+GfKjz4khvgADtS+aOVeAblz

gMzWiq/PlyG+z0N8a6Hbta0qTCMisy3XbAYhjNgupLozdo18lMRfkTq1YqY+fRvgfbbxKbHkHlCfzRAU

kOkmGtRDDnRvhKlhi2PdzLIu8Mx8EaXIXxu8ZUzk6U
Oow1qWjgLPJjXrvQzTI6jzWv1ObKah14t7E9RmFH0MWlHOYr6zhIY8cDPTV5x5dBhJfcRLURn9411DEU

6eG3PwLMYxuxU1z/tptqOu+lnB9DzWzQtLoHCbD63P+f4KWoPT8JNcMr0tLNvqHKQX/VMa/fuUY6RgQA

CP+IxKiOUmwKkGVUaRFU9BI0/b/qhPMvfQeLPFYxV8
8jjVvzglDrbFLBuhWZd+7AQbhMdXVNPBSTkvTz8ZVulG7plxye+lzx6Bd7YPMKGBZDbS3/nw1Ir2d5zp

byctzhioi/z3DxNLY+vtkuBc0q6szEaDKEi65Sst9SPMA81n5h7hvj17X3k749GjMeB8KkHEOzNxO69/

QBnqt0a1+SWFMifI6e3a6vF4AnVhzct80D9m/HF6/1
1Git/tiuMSA4cvmW6RSuUDhqvPC3hv5OZSYquv/3vFsZNSP4DwZSx3172ZwA7QxZEvFyMjQpcPONjO7W

Jm4kkUIFtHcB8W4aernzhT8su5J2LOYEfr/wTBQezhLRkUouDSDKgMbjnsbkoh0Uo7zTb+oB9P3bfZ9k

nwyOT3V8UD/cQJwI1SSKufZIv+cYQ81yhBvuhV6bWy
FtVlluWd3S4QiAw6GoCfOUy2mzNJRxyHNrk3MhO5eK4u35RI9SIoarjazxoHaiVAnsylHvL0eH+D8zzM

NRUP0MBwqHtKAn9BP707gkV1IfEtbr9l+nCHi3GVi5rn5jIXkZw1nfsZGD5S7/CCK7oJtlXM5Gj3lZX7

dZUCmGhjnV3312QuYYOjjixzwSnr3xhEYrzs4mQ3lw
lt6umqOnrR+KrrwWR5/J6PS8yP8vmwBdVauRpUPxSx3ztw6iAy662jyjked4KKhUkJg1cp1MeLfGLc4p

KLV1tSrKC+iK2wKWmmx5kvnUAcNP+NeP19QurjMHmCdMBX0VVVPQ/1qDl2yuvQENBru5FVizr8VmtHwu

iNHPC12IUahFnHnf5atVxJvuUK0oPa7U+D8hyxl+oq
kkfHTq/esn+EKN9jg9eCHEaAZ35eCygRFiJA/roz8Yaslwumr4/qqehg0bgmeagzCZKAUQEkHKZa+Syw

HT76KRAC7uuj5Xu6xp3n4J2h5fBRWLkH1F3n5/rtijIFF3P/LRNdql6Iu4y27B7t1ECE8T/pxNQMMqwt

0jrpl5c2keiEkaznfhjKsvVh2h4UIIQ4G4Af5WgvYi
sHR7besJ3kmt/G3ZlDfZ3qGZ2q+NCQcv4d1ho2b3MlsSDfILhlOxsRGPW/8Ofdm1jX7ChC65seb2uv7+

RuxhQgmFm2lzqvNgrG9V3E1fgrhGNm3+/IzS7MK7g5Z+Tz4VpToEIZpSjbxqgWJKGby/O01oaDLbtV+d

HmhBPS2EK3QQ4Q2N4SfvNYCdCoL9PaGMQsqvb6FaI8
QvROx92f+Je2AM9zcQO3b4EUDZw1U5XyT11eglvhSoVlTtO+dnbP0QQnBAUUk/3MZ5arZAxzIdbF9iAB

7nBI50mADM4t48o0661GAR7cktq7osEp44jikwO5ft+QRPB1dLx/EgtuzD6721vGJ7FANN8zwxSH+S7q

KgXauU5dXbv0YiOsWaz99R5k+1TS75m6aBizYJZh90
t+1zUOIwECRAB4w7gdXmYuH18tv53fIQw8lVsqjCW36OPj97qOMwlMjmh9fnMU+V9+s5do0icpGS3uP/

J7nXpxU4l6t4Bhi8/CywQHZoPPDqkJE061BSJQAEQQn1jS/RGdSbHWTYW/Og9lU2nuH+HKdJyqxzaz9+

J1apZL3xK1yNmCBimC40QutLJMA+EY46MVZqvSPdbI
J1LWvTZ40sYnDvKuSL7AcxtdOYsyKshgPyy29SSmOFPsk5jlNuA3Aj127dxpKNuidgF2IzAeiDzPnYm9

E0otNTZ92rA7qHvYF5htB1f40UNc2RpO/nalD6oDJyc35h4BWx65kquVrA5atQmR9StY+cuicfcrTA3A

/pKoIU4uRXAGUnungpj0TkoeQT5qGKs7yOlCVdRXy8
2QrhvdLnFcMqHRjNyj3oFlDJFIvuH52CboMCvhdqqh9F9fHymjft9orWkDIRtnOhNbuNMJGICPdseDZ1

QhbSuj9Iv4XzTtYUkapBdqjOvqXcUL+ZqtoHXHxiKtVbB3MynYafRIyH9JtqfweSDyWDZwzQQE1kPQBI

eg0vmn9sJPnmHw3zvX3z8HqJEv4fT3ComN4aOoWTbn
uDTeDeV9EkDOa6VavY23GUhNyQP6YSY4DoCgoFHwNavKdQ2r8sUnHwn5PXUUyMWCG9Gal47ZY73WNvup

vf7Y+157CRj6V+uSz5heoCdaxNp+inyw4i0rH4uQA6ayqCzN9KNeMxN96Si6wsbWtEZ96IcqYsqKPnVu

UiSW+RDxphiBOVXSiTqh59WeNwQu6cALIU+CpZS+/P
8Ijqmtt+/ulS6RjVgymOaicL6YaCAwWVx5Ogspuk/A3hcppGaQwWsMHgAK9V+2JEV9sS5jXlWPGItfmh

EAFCwi4rv5rKlp5Z6MXx6K7AwTwGqx9hC2UB3lQpGSNYyJ/dkpIOzwGzVCPKVzLf4q9koArst/5Y+f/k

VZpdD98dZgxSRP/odJXbxlhcibpoBHu2YhOCrYykc/
rYzOcf849cCcWNJD9WBu1KBMHq7/qAqY2CqQfENrvVZdTyC5+87NBASVvCyD9QHiOnKrbsGqVaagdkwl

YkFyLjIDWWBbx2SlWtcCISrTP3WobknTLb0Eb5WqjM4jIKcBbNFV2NthTr+yH6O9mNJZ4wt859LErBOf

qKEq7UAMucdmpgUaragmkOoTWKonWXlKLh+0rbYfXj
ftauQuIKGixxDHY2Q+YO9eQwzhXTu0VlLC5qIKvssT5qL57/lyMWteJR9Nl21GKNvAw8Ri9r+GV8TF5g

bE76DIoNieCMydHGozlujGT/pG97M3ECRRHHoRuHwnjlmRUKEutSl0gCCIB5f1FXBpgGjhcZQtfo/v8E

/NH3lB7MUGsIxoJKyGnhQC3Iaw3eXaQPbOWH+2ZgMd
fDqb0fIGaWNhE2Yhgu6+tXYJei0XUtktmW1L6T+gKcJTQQPq0yLnlIBbvCiHOtIZROj2HeJSuW/8LLRl

5tXgTn6ptniquRnDXL32fryu6egBI5s80kwPcWJWPpWni/VnKQ04xSViI5mHAJ9azHJz+xF3cBQiC9Hm

yAHV/m27zB7D50k8UAEHcxOl3xk+8qV71NPKH20Ca8
kzCxz7YLdWm5cxClbcGCjmNbr2WIllepou+5BwWLWtjjqAYJMxAetGxn/zaeYbEw/KWVo7eULgrK39cN

I6aPQBhfD+c7/4Mpkhzbjp5pm73vZV6t2k1FgNnrqsaX/g+u2hWvEdRWhvkBJIpGfS2brGCVdp1KC2f6

BAC3Fmz7TW5GdDNsccHZouCzJjBJ5b9NsVFhhAHf9N
LqDI8v9uEEGuhqqGud7FjcJac++I7DRY6p4XKs9qEbrwmy2bivLPYWdA61FndkuaCMMcyxHv2g/j3KBX

8+Sx+AEe3DhFGJ8xNTHNGrZAvNFlIVZ7085nf4YQuc8y4eYq2GVn6b9h8/AxmStXWgj9LinS4WdG0riq

QIr7NQpIBr/TEpksbyRqPWeIXt9OvTdWwwbj+aYb3q
Gem5+Hj+97VVL/HvcfUN6Oi3qcQNBezMkP4AldB1RkxBtjCBTJ3ZCC7TD7Ym9EvbY60p/i4x99mTi6wb

fG03X6varOOV1ZcLFzQfwRMIUc5IC8fDWBtFPbHyLnSNpGzu0+giOFX2Nwtjb7S9UuhoFLXVDwRibhVM

ozdKMAewxqJ/5wPzs/6MlatkVf6I67nL3KNIqFyzdi
IgE0JBt3j8bfHA2N6gnewGvU0jbfj2gLJDDu1htqq4YMpSGCLus+K+YiK8HZx/YdFuf9JK+7MBbYXcHD

9KvGWNLdF2tYbeh1HCDpABQJ5UKyWMpxgh5bUDwSeZzNt+oTnco40Tyov8ECXVWx7ddL374b/4Bh89oz

VSb3rb9QF4zHMlOO1QdD1t9M9E8G1ZiOpRs8wZ/5Zz
0x4wORFHBBuVblXimoYa8Uzheh0RoISm+X0VsL3prCTWPidHL3IKnDKEQ32L9w4QDnYMbkDDgw1mE1Vm

AYM3527pP4Qa5Qi933rCcVX4XSkMqP3ASGpJVzF0VUOr07T1FS0wSbEX4QtUgE3nNFItoEn9X3ztaMhr

FD7O/CLcEKznH0fA5AoCZx6Ynh3PzAjOOm/SPIskoT
i2wSuRKmte/5GAvmrM+HaCZkzapLrp2lQF05e4if9DSQ8v8x+7d/BXXlcW76bs7f3g20d3zSXrn3N2N8

Wy/q/jvonz07/mvPgomAFzg/lizE1UkByPRdbUfC839MYeKVx48uzACvQej3kdEkczTMmAE6D9Oe+YAH

UfzPyjDQyxr1j34qrQChoY7cFrwytt57bhdnj0dXjl
0wBKUjn/WjTmf3DgV8do93cKcfzrRjwiD2r92FxEodyxudA8SB7gwVtuI/4b8ZqwqU2quhJtO2nXXlfb

sgE1R2M8wB4L5Par90W7Mj6ugYDfGcPBSuPqUAqgeFpn+mcJnsTNpJ4qYdiqbeU/6NWv55lyfyQEr/Dr

65T0BGXw5Y1q9NCYRUAfYf5UpnVQkM8cGFEbDr7EkI
LBRMHW/cZSD+KwN++AARJAZfiroYa2tJf9Dp27lB+0Sz/OqnZXkSDhRaWlr+Xcneiuf+6n8Ih0L7eI07

8R30cudQkgICla0/ieJCWd7NZCstS2it5fIECebueqqw8C7EeN6TKHVeKBaVAZBxAwb7L+7Rnsl1DLPL

blYXfo4GEaFcTvcU4D7rRJTOlRIQpxFBdYZbjth1TH
DIi8ouCq9765i4DtGZVXfWLkoTLArSao4zzUPAMevv2fTs6phGosWz9GipRhuxgP6QrDXnlA0lbn19n0

LLDpxyMzNuIjTITPWTGGdYf3QGj+tLuWbUA5R7bnbX8A6Fy7YNnmR8GF4wAtPG5YhFG+ItVI5WmWuW0K

1mL+47K7i0H8jD3hA0rbmdC0EtKuFKQQKp3S+QEaYl
EqysUTkR94lS+BjgVHKtTHgx9mraVVs57bRmf4T79GCaeuLvONfU9GNgcevT/twaJ4es48iL1b4lnXX3

1+oxCab5DcKwcauGXLpMfbkI5EpyUM/qRJXthyYxWqN62f3w54iPqtzugqdLkfXzebgFMmQpGsZ+set2

OxLBydQI81dg3N6974x6MqHaXBZ+RbwHFMMk8Eli36
MZpP72dVZBIGirika00Re2xQ5ianw0Kqq716wYVvdJlVJC4D2SmfBEVjxROvibiyBG56lUuUwVTXmc/M

FGj7cL7W2DrlfiHLn+BysNjGNPl7hlDq8nMfn2n7sbBrSW8ahx99EhmoNUYiLUTsF+xStNqh9tESUf4o

kB0Wc/REqmQ4ojO/lft6nsjfuSCSdclDZNgC5ZF7s+
jMYKj2IxdI+EW+0b1y5BnFC0MlXM5Wk5sEfRo1QIrm7UVrECJmI1Nm8FDJJm94gjPa2RYRQe5LEo6fiY

SB42gDy20HvJedbDciWrM0bbYR+v0+0F6Yw6vqtN+Lowell+u0jU69F6HvOS5ps3oMu0P2VRQ3zHJU7xF2y

8stiKkhSbg4de9hyh+oTGdCvltD8P1a68fG5IrYroQ
3v9r1I6uH/d3opVQqdJo5W8Hq7FWi5qDEKjDz0tcDIpqE/aVjcnBseoOJyytaa+lb9tjktmoGjtTZSm6

bJseBTzJMQSFEymSoQEQz000x/rk6qNkfhsM7pAQ1wCePGp2EME3O7dm/GHXFUNjuaG/bbZWboJgx89Z

hvMIm3BqfdfMiuuZXTbsJDG614aMiegA6gQ+XrW2Ub
iFK/W8GlyJG1+OYTO7dML6zIAaz9Ra5UM85SNY54nWxltsQwDNGc/UYnpT5vqAFx6n9F04zcF+pwwHht

kXFRmn4OY79xA5WYtEm3wj5tSYEVAunk7xkzAObj6nmyo1tzGitg8VhI3MI7/theHsWrnldtFF3v9O3J

+C0AhyAvbA2+XEEKg8yHFpWOLoJpjbWbeeanRX3iZT
SMTTcsuUYsvR3nMkbBjojjxDlLfRg6FcA5Q2Ae6iinmWamgOEE8EgtKiKpwAJki6Fzx5guhUgn6dkPka

Qle/iSyjG2t0go1rioNYrcjTwPoROhnbDjtzphTh0QlaYIjUUzVb6Ojr/WTIKT+Zdv73ojH0gTpMAJcp

8vdOk4tLLtiZb3bjk5gZJYyJldLcUyBzQd7FeIRCGl
gGvQoZmOV/bj6uDLKIt7X4jb6GUF5B6g2zEdgAd1EFfX3mHUF2o7KEW3BYoU9BehMlKQUN5f0lft9pXG

K0XP8JGb7WAFAfGgX8nQ3a6mQhjmaVgwvd/aAvKBoMX4C1lVgf+6DN0894IafYXkEhS7US6LCEwmfYGo

Ja16kay0eth9q/TfCz5oNkBzsl4aMwnARZNiq1BFv6
dHFcDP1Jqe0HFPNanFLU6zirDhRpUIH+3TZ2fb5CKcrbV4l05ef+rvvCUd4E1DidR8I6XENIfY/Oh4rf

SLkGktpsYLtxK0y3+EqFaBB23S8IBb9bKS+W/fGZMnlcmk8EdoNCL5ARfyJrgP2UQZypv9oi83ZppV3Q

RwFZRp/6H3xx/EThD5Lp0Y+A1EGxH6YA2VmPuO73Cf
CsTlr0GrDg1/uMY369Tkdzem6RfJ7tO7q4dlLSSxtw5D4G12/C3C1bhiqYyMn7aAK8FW1BscQTaDKtfR

d1OaHZCRdMV2UL+dEJ2wZWIz/6S1c/p9irU/2ESI+D5yI4yW+74S9axVKGFE+gcsuqYqTOqbEnhuBbGj

f/xjZEG2xEtaydCtUOV/GeOXbcqMAcmx/6uwa6QikA
DL7HHcZNC2wrc/dZwtiCdcIQLHb8gMVT6NAHOScbfLN83vWIGA+6YEcPRk0IqqMAS53PUnVGFMrzLbRf

JQTDEFOSlkmPthwS06/r2juxS06Hng8SxwjuGzAggXAJ1GYfo6830Vzje7jC+XHg+Xjc9pQSYpRPSZds

eusuiVgnIUWewQkSxxM4Lu3lgd7JnGbc6pOFbOeaD+
ltTW5u/eZcVbYil7NJ+DdoegPFVzF4p6f/WvXMxlkQPVtaeL1rLaqy2Ppud28hDhuQxKjaQMVhEgdNbM

nY/qjm9+yqwzkI4PyCmlxIlhToKyYH38RLdcQGnSg7Yrrwmu3s17winu+CKHmiPQp8rkGNhjU5vwKvXk

Za9Rjj5zHJWODUl9ZUj9IlEZAzBSjjptZ1TF4n+M8R
49Y9mwB9vtCY7AiT/6cizpDenZe093pI+HgJyQHLeSY4MWhh6rT0pk+KDuh6rFnCCLXnjPYGGoTMWhD2

OH1sE8tY6jSRgBenuqfeB9dIAVA51ynMFUcrci4tzdZ1MGq/Wm5M8we4hxK/pfTAILxiZuV3/Vtns4h3

ccz7QBYnjBLMyXk4SctvShqIhR43YwUelCx9MOkBnL
fcIGkLUAmpt7I3c7NdPreqhWqY28cDqJrSHkfrIZVUwyXxqwKzRpUhLRq0bXranqftdgjed0v3S7Vpqq

9swzphM0KBvK/4LuHT2qLxyAxy+tA2y19LI8aVFl7+/kMC10Pf5Ci5oFHhE/t4dsgf/5Q/ZVLNLzlnhE

76U0VAcW3bkwnRtx13/IFrnJnBXzjVWzZVlLoCJzxR
7DD1WPmUUVpiLFFB22rAMWvw25DnBLfnatItX5igiNnJOwPnVKB7tHW/xVwmr3kmxSH1K4rcCqxE+ZN3

qXsKUdMq7i/eFBc9ZzPDxy7aCWlUh/b0uSov+rRr+xzCiGiHv64KDcOA6UAL740cQI0j9zQx06uwctb0

/IiYSmtnLTsoEVlysuVVFFdh0+CfLskC9/XpNfGt0Y
NitcCXkt/cqYbC6zkAIdzoplip7wzhNuIAcCHPZHIy3JD0LfnptxdBvnXF+DaM3hHbeRFs2bsHOvCovA

sOGXPvit5+Z41w2q8qRmw97qQTCxbO5JGDDgJPBXzLB048L/9yCJu749LiGypqgd/HSv+WFF5BgIo0ft

kPqnUqUnTsu89s9PUIXpaWSCFJNSR41CWemRtIbBsY
ZlQqelSkXp3nKoy60+r+6kyxlmHAqr79TUIlm8UfN34+Eo29w4tq9rklCbXqqu8QyeBzBlMB5+3ZAFMq

LBM0ueZl4MzMiqZLa5Mjc3BF2JInj040+NHMGsZnZ3W45uGRFjU082brDoWe1ELrHiaz4chQnh/PBuX3

h71pD250Mirhowoto8CI3MG+66/PnUWqWErR7D6RPg
1PrsueJpAWMiU8EV5ltdOMRTk0M6kOHTcGy6degZudaUin+ECvL7RAD1wQxRuZ1mQAhX9DZ6S/ho2DsB

GvdJ136FZlf5YzlLMFZE2JYNVMXu00jLBwN3bcaEuT+flTyBW6CXagF3qTKtnAGbaiHbDrtiLTugDJbp

cvoVZV0UApxddqRTYj6vXryakrMAENtxLvWhM0fu/O
E8W/o1rjdzgYPc9MuJjbt+3vDCo6eOgHdmZ09ttxWW1IFCLI6gdOBV3g48+IO18SkN+JdQ9fZT8z24dM

Lzp5jaM4MSYxouhy+lQGghUHLLdTwHkeYLQjOO11+SHb98Fs1hrDOaqBzIeFMf1F7JtDA9cdOeajcttq

HurWozWU8rc7b7BmtdHE0A4i31iuCXPJVoh5wl9rpw
aPbcY1OerJu8DevqvEet5ACqGJTl0WRX6w9Xa8wG2uKgI+xTVEetS+8JNRgVFpbjKb3pmApiWih1dkQW

lvmyWKkAMAnO5c2i23BvL2S5C3UNTJTU6bhvEGnh5GyX46Z5/Nr+MP/9e9qJd6Ecb/WdjySbf/tvhYHr

2w4G01pkYhD4GpULYHrxlNy4fw9tVt4CXNsV/TtWU/
ecEOPMbXT725CEqxzbR9RbU5x+F5lFgzv5ifiNqLfr4AyTKM5tQKxNcjATN/0DujG//HI8xr4yUHKHdn

zLYcAU7+u1Sf5X6aP3P+vYeZp+Igg9crIHcO29LjGVmUCQYFUSKc8eLnwj/pml8UKtmJL6hIPlbV9JGi

ReXge3CpQ9jkAN1J99YhEY2of+r/GwopocDfwYvSyH
YvfFomNAMMmPvwIGAVxtWp8JuCQQcQu95vuIr6mZTg+XskeO8MA0nJIWAMPcVzjK4wNaM/vdeaOUjfxW

xi5agjnRaEXa+jAUvVpOvh3UYv5m80d/85SgFf/z93sCd4P3hW23hgq/ep4um8o/QUoRcmL91lUbNqXp

GeNZXVM/J3VYeiMNPRChHn5yGafRsiFaZyLp825k51
TC8WVsz6Yg9Foab5J67cDOh5obozlrmpsvcQdzkBJgciNhtpGoKtRXemlJWxDHH99Ir4ZRZnN6Rq7UYq

97wACrMxqWghRsObqbCLPrShKUt/0MmDhCx/zpUsBTg7ZZZ27SYUO0sYg32Co6WKUC/QU6w3IKsOC+9e

/r/Eka6A684N20Yv/prmdWHP+9U3VCJwV4nQDWB1CJ
N7Ulwfsd2BEp4fyBMyL1Zt8OJJuDP7CIw2WpkBrPe+YQD+ZOPAU/mj/FKVfdVPT3UoeNvZElF80aiC5T

2onzZuxQ8+FWej8db9bKGRsIXmLTf187W2XZGkIHE4D4NniWro9Llr0n+sgD86nLMb7+Tp4iCbcgBAMg

q2Xuh6ufEeyKGEpUka57Q3RVVyrLCP6AZApIYc/3Po
Oj7hygoduRaFE4MG6pzPAkEE2hQIE9dfbSQdVVaO4qg3MTwQiWc4txh0xNH/b0uKLkhi4eFz6JONOG/j

ZZrJ/AEjkdKPdDYurArP2KrIH9Uc3Snu1R7TQEBncVPCMEZUlhFM/cbF3x1wMafqp2Z7cxqBzAHLlnYK

riRxwyJm7WZspQ4Y+lelvkGcNHh7xIxD1OY2zTX0qt
IMTfDg93Heyqd6Qki//4Ekh+tmFOo7quOo0RquwyLp8dyNdelS9EUojwGJwZvzTnIounoQFonmdfGmGu

zbRBuPuBvR/aXCAmfW26qdd8AjmRg7LmYTSkA9VZ/WTF8MUOLBnXKXFDn74QaMjj/3ojkUEigeClyOiR

3OFcl+w5JChMldhe+YDfvnJfkAruE/o802fP6se4c4
jxBbmN8OE0KmlL+fosR6ZV75o3LvxaeuwUuPC+f/Eo1URupUw29i6cEiEQD2wOJmqWjylc3AIIcgS2PM

SacOHHKPl2MLM3Zgw50//sk9ijEN94ySJQY6paDJfATyR47L9ZdjAvxzMSasLyH3PW8JWMP9C8LkWu9/

2e5rQWBU/MMnTVECJaz911JrnRksj5nwLiUIixjFCW
KNazlUwg1dSCRFlwnzy4eTj26K5LdWrvrlUt7czddse5g3Z04v2cIPFyNXdehsslTFFURGFCwzEkc0t/

zHkV1NbeZR5X1NMAzolt+GT0GvM9ASc7XknaDOWbSCc+TDrKqT+ydWm+xkH7KNRPikYvjitBxuk6ZOLz

MdNlUg9+IRtBOPlPugv7wHFnAYxEc/EYJlMBgdzgkJ
kcDGWwimxARGkqUCRG9I1n5Yp8FAxGI+9q69Tv3DE3qTo49SkVmuNUdNEUXeyw7J0BYxRd7Bs71sEYem

d+U3JsGCICaBzeiKP+jedDrK641iTMIhBkr3Z/6EE3CbcrrknGGn7OnCrVah42lm+9+A3ka4NnF+YbPv

Oi5/m+OdI699LNd3VnFN7mb9SaQpVhNXzftJNX0h+9
IKQQPjMZbQ6PAkU0fiXw23Xq42BB7JABwcZpudAiwrZS3fLFsx8auLkwBUcZbortuq9kItHY2cNE044/

wPX7Mf6lNFpmZluhvVlWwf5prkGcpy5Ky8Xe/kxCwtELKgwWBfQ4dO3l+htlssyXXlakFU1eo5pS1eVO

zcd0SYRS1owZAv2DXzhRd/IIGMtf6FrbWeWrxkWIVD
gm1jinVz9x5BystVcWivBZJHA2iLPO3IabwJ5MJQYIm7oGvwg+kbKHvSs+PaNfmMiex+pS6C7AOlgIO2

7qfkjrkHGtEMT/0ZFB74EC396j5sC9q11pywJObgZ4oqANn/jukGMjTW1M2zsYbAa2eu8E8PiIR4ikZM

zPbZKDP+EoCq4T5SB6ZyK9HU3IKwJPUkFV2tIcvec4
PvPOom9/91Q136Pjozk8cvIteN4UVKvm0ouFH/dOo4OD68HG6bkSWrXQggbWyZ1Ysq5zT7vnWgPMhUSC

NibLF5oq2YpulCHfAtfLI8gC6fxfVB9h1+JOqZpHNToqVPC7uTIRIIvY2za9wFKzkn6wmV6Fb6ArICzo

RLUF8wY5npicuv0ghEiNLq6UrhT/8f4r58+xXMqKSM
IaeaTcjHj0KkhKL81XHMBPm1DobS6TLxjoNjvntyqnVl1g9BPrbbTlX3Q+qyXlluWp5cRc3L97AApL7J

pv2PJFzAIUScnmHwl2qchxjHT3xQchkv71M9uo5iVNDkQfdhHDjN5KRZJsq05+le2xabMxXtXoyelo4u

yxcmPBfP+K689c2PKPtD/u6r/hKKoeAGTMwq3UyyGZ
YTb+4icYr2a+RECF6lObltHHdv+grLw00ZOozqjjWk1gXyKEvTvQ1p8Meqf4goJ8+7S8xzEq1XBMlGQp

THwPW2cn1F91igdWaxahawwH92aY7B1o+53lLSfVoAOFZHazhO0NVfRHPVngOtS6fmO1mvNoA5x7D0Co

ztNrL8My9gyVpPYY6fvYJYEzN20EJXn5EGIKizN9d5
svJSjDMn4hcoGM9vfSTsxG3KnPD2vPKc1uORHflE32Esuh21FampCSRuq2zvnU9ruTTgl8i16bfeZSw4

wg1gBKNGzzZ5hKXEd5MQgJYoDRXmQu6V7e+PQPF+1hlYzdgXdfbs0ybjA7qTF6Ns6SJ+82h3QEaRe6Hd

HGCc0+E6p9GvOc4pRdXj8dlFFc6HdN7Tr4t6+xb1e6
oNqJpdiGsEPJ6crHqbkrke5ikuRdU+ESucT3rdBf5AhK+DkX2sRGg8RmTNOP67i5Bk3yBplViYxLo0+k

Go5vhGkrKTnRgEptLhXsrI354NEhLrPODUcAVeNMSqYf/C/U3GIjv2kbh59so3fDVfdL7xzmLT6PQgW5

gSTIGYqMP5J5hoxlZo5HOtNilrhnvHboOPOUYwZ791
YdJNLQngRc4/vD6iywR4dJZFttUSxMRvNLt5iqfwjsgMWMh4J18EGGv3XPlU6bRXGf6KWBASVlE8JLXW

vkIVCYch2P7UksedpeySFgkYbS5uyaGcY7A+6V1disH1zBTYlc26/dh+Oy8Dy7BzzKH+OFI2o8mJ/Yeq

dtIGXZfVqOG1/nhnGEBMyksd96T9C3BEOZJBu2I0rW
eakdNq4qjT51aDq5NyJLbvlY5Q2HJP1A+Y65dnZm9FKJA2CAq3yhjB3Kx3+LZ3SkoSCIRnaF3r5CWCKu

X7UX//K92nTaXJx5qToCh/OiQgvazE3Mb6oNvbeBmIjLqx7+XCPAcZYfwFNwq31qyY6pTIA13Kdza3Ov

kXb2iJlxEkQPp5+udU17o85wS8V+7yebbgThxWzmfy
PkBq9444ZgrkR8h1kQswF4GBHIUEVvssM31o5JXsQAW3TWhgJT1b4LmJ1jNeKlCLQGo9VXKimjdKbNOx

wJamnmmluX3swYhKQIssDs+YXtDjgO7ZsXS2X4CimSkucKJqpdQz0HxbHQ0jVrR5PumYRq7eeQM4RJej

LDl6mAWfsQvu2Zk7eeFy3YxjAQ4MhtQrEbW4UFH2Fh
pTNDZowXe8l9y7WoRIloVrZi3AZCbxsV9LaHKOt5D6rnM6KRGnaG8clRQu/ZoKzHs5tW87dtHRiaf1xb

4TgEVycycFHP6HQsLZdvhctThykm4pQpbIV9FS7U/ezOVwF4mYa+ffCkhQ9gNaCgUQggLbkz+rZm8zGr

OvyJgFX6nSXppJ9KX0V4DmqtUA4wvLrdFbYmxp+Qnf
PHRBTbhXbVlkb2xLMw1ZX+vYMnJ58A6fjvYB64vPCKdldIY1/ExLiN6nheL9bnjKDyW04Ju3wVji0FT0

q+xGsmktF0Nb6/sj3RRkqgkUfrinMXkijOZD0gOhUv3ozks1Vr9veBGerbic2hMHKHYva2Rfi7SpPJiE

xhS8LGb6DMwHCXcukBweXoDOiKMcVK2dLWSeZLTMD/
xMfa19evuVI2zjbz3D6tALhcpY8vikguDniTNCSGxa8FKl26jKyu2vUYEOuGPKo2WGMow1O6deeLerle

VUAeco8EfvdU1nY3PUIhyvezPf6iNfIX0lpDOFzG15uS4f0lGgAMtQwIGReDu12PGVQdkicAbj3Izlpt

LPV8XWpemY6M9NDAPTTHn1vVeTCrK7yZZIz/nLRrfJ
FTRkVyrBnQ6mvxk7bsMVnKQRFgUB/cOSMWt/tpmwqVG6v6sOLk7DSAZkYsCVy5JJz1/zloMPIeQBGghB

xZovk0QqySwQ4cl+QvHhU+Ts10eEySs3iqSeiVj2r8U5ZaqCGUDMIPPExnJkfHfki3Lrw0kHZlwMKEwn

Y6Bhpi2+Na945Zrn9TzT9RVkliVo4ifVoSWhtxiZwj
iKZtlqKXadT5vR+WGoJc6G9Rw8oTDX2iiaBTOkBKwjjJxfdsBqEn9iGtH4ZN5Y3Jb6gbwhaTkuuHi4gO

NT6ISs+p6D40jDgrXWGpBWnv71Cpp8WYSATdsErwwV+OaD1X5t1K1Wh0slZ1u0ek92fYVz7z+dcY6xif

XRoEJ+askLlsR5hvoX2yed5SFnQ5M/rID3vBFtxERj
41d9gqjY1uLsezu5at0xs9mhyQZff0oXZeXphuQHiL76U0nfcp1USNCl/XCuTFS5qz/4naJfiM52NXOB

I63QIs+2OO4yf7sCKfGaH1K3bp4O+HYv+CXXQi6JVTVP9oW5G2E5bAX4bWc7SBHqnZSIP2TMBvVKguJQ

5SJwT+AFAanOd73RkHp2YnozUjntpWeRcUgacDBGL8
1Ujec6BOFRBkHf0E25CXhopx9+gTjVBJgJpmznTYuGPfbd8wxA7u9PqQ9mYbP27lIETfgpzDcv8OmoUT

FU8ufln/ogocTbXTy5vF1gXyjaIabtRWmUAi5+DVb1gQ/yxIVuwq5QS1ByrT4u02NXidc0nLKIQQUQxW

SnC4M09S2cr4PRPTd9heAvDhU0jfe1s7B4yxj3/NEx
ya88QcsUDBJda3Rumk+Ycog+BDb5lB6yXqKRzuwxKIu6C27QCiWmbcmOMzP8DiOSwomNewIip228J5De

dcMw1zqPMRwk0jf+A7OMKOQ69c5VgZQxZxAawMoLdbccU6wPxReHpHDATQqLlgDiG200oW023DnQXtHO

HuBuC4ZIYi5fkaAJ3Tg5e6PHcYwzAeLSmOe+CxaCri
RhJypYCj5DNpdqBxNoblPPRE6ShrHt4tsQl8+xLlpzv8WkcJAlBiEPZA5d6HNwlL2cREOP8g8N4t1owA

4F2O4lgoxxSqkC461eXweSMBxgMyNL0fCYHwKP0ioDqZFrRzJVZBMjkyTCS90KKaU+d7tAJDJbbFabdb

LzYgu1rjIDZCaIuiPXRh1JUbMdxzjWPxROkk++wUjU
dGzJT2f37OocwiYbgCdHp8wIV6i8gPcdVFbrnJT44oytyOYYMp/qQemnFaswLaOzOmR+uU5f3NEuQxKA

5d5qeq4odeM3J6l7jEi8D2wZ9dUTvIqzc3+d1e/PqlSjKbQDAgnDJjMr8yCGuaPlSizd3pig3/2veP1T

w9DIqc82V7qPQjfZJkeazj2hjpytu0g3znji/13BIH
u616u59tzz32qtv6dEpgQn+D7010wr+vasmOg3be36EnXVwTLRDWJ/kglY0T0PiDSw09V79hFp6ff9+j

ef6PUx8ZlXTLZXmR9GxWVjoi0MVrte6Pkqkl1xRx8t2A9SxHqkwrullAN0PXiFyOM2bsJF6I16k4eg7d

1jiSqKqXuVUX+N2Un+uQMbNOu7PrCOSaeIsHWFGFnK
wDqIWYnVcbive9MHsnMWCWFto/VtQs45slw2YR2ce46+8RLlpJ/pgeAug8sHKHtL+GvkN5wFrvIimvvl

1bdfLi5GVKSf5DT2hOePpyHkeOMvZ8jUHN175G9JcFBfRG5p0VPvFbEhUVyd85/HJmI7cFUYc4KIxmdF

NVrpJbtkvisOt9Vum6u3ITprEiKTXMjtU3myY24MB4
19nnKT80lVPc9Ugz1H6fBWuLdatFTRBJwWkz0OjgSW6pdtTE9qtnmVjyElTojKpNC1M9XU4fRT87qDEt

wHpPSzqZ417ORrcye7RCPEXS9eEFijPMdkDtqSoVgMps98dKhbFFZ9W8VpJry4GyZ8KvVZuV53yOT3pi

9PRanh4ex8H4i3oEqpDSc6e8mqn61Ibcpttex+NSXl
VBT+62iHxFog5OGLDD50lHncL95H0iM9ZKV3PK2/dVPS5nwTmaLsTm/IU4wAvqI64GGEKsUec+DP5Ya9

Tbmf9WDGNVBv1qjOQu9VuFeUmjSkhZH6JERuuV51Tx43aPVLRtDpqSnupaFOnOhlE3QGKVDJAigsjhBV

Ju+eZdBMET9gfMGozmo4DiMjYnwETGMgN937v4az3y
do+3X0s+ap07g6VwT6/HM7zD1ZbNkTJcsr/bywAklM795C89iB3qS7dYy4lI/C42PncY1Z3qFnz6ftjb

xqcvnSe5gP4KxdsjuofYKmcvIZZzc+dzd4j7Mmf86T+53z7z0LogjYLmE18cBy7IiA+caeCqGPOaS2qM

3EAVQ3eU++CclTHSj83RZiityCxxImANC21idVSOth
Vj5EFTWvfobr9FajPD70+QThFYmmlkLKyjvNdbG7tdVAdhO/uIbZZA6pEv1I2bq2Ph1DSUARZDSaXd+c

exV7O3TmQMNyjOp2U45plbVABIR9wga7m1doEuzP7BzFpocDTM6K1u+5Pz8lrqsiRnXGM28loYiUpsuc

jr35/VQTfy+SIXefDpmcnI/ZBOXSC5+iRZW+pN1du8
QiOQ43V5Gqy3G+AfWozZCtnOMC4SYZyTZdrDBJCOAaQA+0DHpP7wbcbeDiiyAzWDFbql0WhhQa+Ex/+D

k/cKa/BEbg2EaQcHeJxfigTbRWRjHecUW1DPlRuDQaSHqZFPJcJ9IMUbSxzNqUAQLXky6Flj/uecmWdm

cbsUdNv2sL/2/tZe6/1+7/xSkP7c2U40jDrYmlssuG
15UlV8wUSclh2OdLGwxlxEYPm+UKZyZSqTclTDR5dLq2XRMP0c3Xmir7bFCSAuTu1VFZ+CEDABykFSPf

4NDVAcKLlYhNsUmpYDm2Uyy9b6pwziKmN3MmWDuY0VKSdrcGfU/QR2MjOOJpeEFyCpFDiC9rLUAxruxO

/wqg7V7I16uAIz5VapbvBya99g/+ayN/wnzbOrTt6L
f3tn/v5DDp2c+oJeay/5aHqoAlpzAIrXNA68DQ25KeRnNNuOIVR+FUU9ralGQMFTI2kk1sdWaPTE8qHa

OUdqt+jGiOdqWBaT0w4Rk0F/+Pbh8+ws9SRNOjeT4fqUBDJYw5j6+tGyoP2tsEdUE8DzsG1r5ekOSlRc

ca6PWChgArdLO4M+fTVZaIeR//g2RjVkkf5zIipO28
eMvgCklTuHhcYMDGXI+0iyD3qbbvudzc/ZlKTPvf4sJ+lZmdPr5dAlVRlcxLWgivKh55wpH/1+82hYVS

GTFFsZAV85tSopnM1kG+65Tov0id9KfWgDfuy5v5jhkmaPH2Qmc7SMhfblCMeazNaTa6WR8gwXh1dX/t

DLLSyHk4lVY8AZrbs+5cu4z21hblDL69kvExJNVsg/
J/S8USFGlbSLYUfjumcYpEPjLvuhYwx3riXpq49OpAreFm+deG8Dgk66t3OtTjmilfK1DhHcGF1vG0K/

n1kU4OPNsRlLKhqmmAET3S7Ia7BqcV5wSp+m0ej+ypFrEcgzlaex9hP7O5Rz8IYpwbdSo0+bKaoOfBv+

QIhVqDRCmAImukczESaqMVNoYNpytUB4iLe5c4vrYb
yfY1Ebq9Llx/jaS9l0W6kIbw38KvcnEUQQlnwbSNBHr9pzJN6U7FfncXyblgdPUg0aFFYI2OB27NqmJx

FWHknsY7KlLs/qDJnN1MhtIRod/y0Y8hkkvlBGEJdee51xDAQZrKTJtdNSEaG9G5JUgK6Hs3a8F/7rVx

epzHodGClBAvT02vUibvOqJTe/Or+sle/f1LGobOO6
/gltLd/qiQz6HsTuLG+d2shhgEFIpEAXf96OKnb3snjApZRrxQxlzP7Y5xLzc/2hOb8G+KWzwN9jteec

nxVauTUEJ0WewgShNnmu19gJU1YLKZodKZbaLvuZ21ZS6KyksnH156SDtCXzrjIQ0JaItzjIt76Fv3a/

0hmp6qOAjDp/JPIHSq+s6D4m1cl5s+JRqjJqVmcCcI
RAuCxcINAgrP7SFh29wwJcEnD+8EYTafNGPooxTvhFNeDQ/zh0pm7oA2rdl0iZV4UcvcKD/+Y67cMiS9

hT15GFes94+eNFWfz4TdL9Mlh2enyIkf+gB8MKXmGckqulTlW0AMG+A40s1ti/TAUK4rK0VmFvvDR6Yu

3KNM3R8o4Cc/GyX+FHOsPrzlWS/xfXlvz/6NrcgqSx
1CUfffLAwa/trWZOf/bukhko5cB/Js3KCGMMi7uv3Os7rhAXf6j7yuAonk9/oFxXKRMgN4mpQ40UNQVX

Ee/2QCCMsoVEDzKxyFIoSg4BggRZ72L6LJW/8SU7B+GQypCyNl1i46w7FAoJvc7VJj1ctKmeZUh+VwQl

ZD2yC/bGXU4wj11LpDuPcpXEIIV79pG0l9xW+rR394
QkDVhhOI/+MYljjNeCFQNGboYCCASmIjuFzKfwYSnsMa0ALKJ1xLlske5E3QEBpLDFnjHyQ4HocYPiWb

xyV1eAib5zjvw3CXxKK0B+D70ITOvL1kpzHkE8ADPBOQjm6vOI1TltMKqh+q6iwp1yTPqfK6oJi2bS32

PNMUmvJ5TN9Nvg+FhwdSUtpb2jmRStMGV7sbELNeFT
F/oL75LnZHIyBYN8kaTH6aS/NI6G4OnHxOqUpC8+bd0lsFIrSXbd7B0NdDem9w+uloqJiPnpuuBlRL1O

7M7twZQy+dRgsOasltpYKFCg1b6Lo+Lnx1pK28Nddrc2gd4J+/16NovUxhK1dzCCkD1jh6LG9btLe4ol

a9orfu0EcHZAgRz9EN8eLuGyPLNb+ku0dTFB5lYVBF
ZoII/F4o0IGTzDy/zZEo6LzL7tF+zyoPhTUvPn8PDlEFj5IOB+f8JVmTeaKPg9QQaeRUR+67Bbak7w5d

/6pyXsTIClbYWjWEIaVKSm7N2a0kkOKI31or/vAEQ845wm96ckZ3crtm+OvGr8KXuMGT2M8gWuJGcZks

+rKX5sIcGd7UG6geVxYhn0Hxz+VIiPYCtFL7lE7nnj
2f6UlkN7dn/MMQxHYGVx960ZKR1DL1bYsUjzGs476dj7kj479A/7dIg1ZNYFBUxogZ8jb+LIrf/BE38C

uZRsxVaBetP4ksz/B29hp407r19TP8FLZIubN8yre2zFUXFVWNJE3Meqn2bFdmoX/Ja9Gdm9EF5ORH1N

D4VwuX8dHB+ktKdXzvtoZ/Ugw1VJzJq2LTWPqQOxzb
rBQJz/3Q4Tpqp0mdYBtcNYjQe9ySGtuORm7hvAsqbcVH7P7oJa/pz8Fpo15/43jS2c9OpyYhm4vkVeqI

L/M66xtXomJJTjouCNLfVpLix0ir+Ovj/pa4vliYHS+Rg5wjIOHNgKQSl/WsmikrJqSsbYDevTV+1wwx

0xtvmpwZcTJMlOSnc/C9ETqks3Anvlzx+Hjl6QUeOc
KE/l7lHTgA+h2cLO5S5vlPbL/DGpfnJSO3sjdTZVCwzaTdlrfDw/qL4PN7nCBeA9mLWBTplV6AI0k6Fd

EOiLhCYLq0ZX+Yrxh9RtidV0Uyf13/gfU8sHPVdDSvpal7e6j+SxOTS9ZmqP4esSb6cCbOdTpZSZzjg+

eJpQnnblfKD0y8MxAyARvMoP+ySyvj64f/R+Q7bptE
tG/2RANWOQlfrv/NLVDw/J99b9razsJK4qeB6PexHwdOQHe5dktke+vhzWYxr5tkU8YWuU1pFW0iDa9h

KZ9BgxpVixLoAG57A5cQ2k+LLybEw6pGZpHh4g5mIq0dTmDK/Ed4qP7JmG6vD0bD2SvoMQ6dc8Rdd0uI

Koqiv/IggbHpt5BxNsj4KSzm/a+YJOpFj7X2dRZDyY
rFa4jOZNsfWLwhW+IpWF3lpxNkW2KCZ8vvJbQbPvz9BjLqicnkxSx5U9jQ3c5UcSVvT8SR9Verzr85pd

KqbKnTw4XLV2li9O6RiGYL/HRyKWbNJQH+BT8tZyqDrJrNddVFAL5MmHGIkuP93X0oR174GNKI+Dwupc

JQbUyaCRxbpKlEQvcmMA/1/HMU+alvkyjGIKI4d3hb
ELKqXTKQmhoPgXknvR4jIhoVdBvoeXZotjYoVqrdYXNfmqMzQDFfs230ZgHIQSXlL2/8GVLHUJLHvvIC

M0zO42zDBEhzdj9LRpN2n+KwHx+bKMowZJtoMzBXq8zjrm+env1FI8zuLeX822HtIf+gKrxvI8Jdz+eE

8Blae33oIdVsxC4q05XhbdA5bE3PH4qrPAG/B/B4ij
IX396EfN9SCg6BjfdN3UxTXlQHJ4Y7+Q7dLqUnebu2/sQct2HoW/vjZTNsq1dknZQ0RODXv08V579Tv2

9JKDQBCaFVMdoNP0Z4etUgC5kkYWq0E2ytEk8gCff5WN5R4tHOZU4xk3BhOIvGZXjOoNfwF4FRBzV1tR

g50oLFuIzAFioCt9oyxZ5CvsGASoYa71VylPrjqmOw
DE3aJpccF3WjyLLqdAuUS6IQBG14UsnNs81okn4p6X21YVa88Px/5Qv7bE+GWKq4ipKvvPAtn3SPuXqq

Q76tA17dojeo45fiuyHaWbj6H7EQgxAO9QSA7qE6viztzmC/0+ket6b0ukFhtcgbVvK8sukZRCdT6Esk

G04xcjsS9i2W+3sut9N6+Ayd3i91eP0jXvgxkRJrTz
ktq1wg38OVFjVsJ/j2mczKLeObbuhNBmXe/22YyLwglF23YhL3Eq7eMhDbJuAH5iz+dprXWOkR3zU6PG

6C7nZXlwHfk925rJFL8bsPdkI99QrCx6phHe4CPqCT/bx7NBcqfUE+G4orpd82C0C6xVgspoNtxOwGHy

Kv2WWZlSVTxFR1qR8BYeV/IedFGSJOtkBH7FVC0al6
1szNBTVF7zO+f2lcS6R+3HN8F1rf0knkNaxE9RHwWkW2p5Vp6hkTIS4lKdm1J2LBlNnAt2YPeSfglfse

VmMmwmjWmIAZjDMPl1+Y2JME1gn2+ZKG3W5rr/e45avPfGGsPY/Sik5lkk5YxaKtNJ8H+xggV7imxMv/

FAC2wJRhrEiicddlakiVg1IyOxh06KrpKC/0j/0Ay+
oPiaLKHJSabh1aQYNyL4gb2/TEPXa+YeOBICVUrUU0XG0M6uKeyAuScsm7pwRFiVEqemwee10vvmujTW

jkAqatn7J7GPSSCX4m15NwSewSAynCbeEGeZvte4GTSdXdmhVnd9JCfP+PhKX2yqlSAeauil8SooWvFA

X1Y2ZBYLk9RIMPgw+fi8Qtt8bGam0yjPOqA7uq+WU9
GzzRJvtD7j75kgEI/uV4yXdiVSeAr6rQtDNabINDbNdf3CKEeq+KNDtOMaEsQByTp6qLyg3tvNaskzFR

A0SnVCY18cZ6vWnfvZvORfYrszUAB4e3E1k7csvLRWWlQzrM2DrRl6DORm2gSCRLnLadKA8bWe48Hx3q

G2x0vytzKW1Z39IOTritYEYWwzl4FGDYoGybFOqLI4
lOx15aCXGiRPnW8id3nEfRuLyHdgywH1Ed7TCgkG/0b4yot0/KxlS9iAhYu7V1+YZPcnX/ee9PFLUuy5

Nx+D91Af8nqxafx8CePX+657cPFBw46TqRVLXrlI/ONqbfnBDn6tFDJ3N6bOvp3P+uvb2GrwdXk+yYPD

eoCIvy7tAZfJHGNtbItRkgLMHT+3dGfzrbu8OVLWK9
8lrw4IIv8LddMmvUIM47/NhOdHapsT/xKn/ZkbXd5g5BpVdnMATnGqaqTzGvp+ek3xjw6Y8SK0g5lZ1u

wOMw/Nac/4X4gnonH/kNYBL2gzH056/fdx6U79DrDIkj0qwhr+wO7DAEUp3bCzFBMHxwrmglE+4amu64

ogRfGibdC4LaLaTKICeLUW1kjeHe9Bmq2ePsuFKaCV
eAYSxX9fEfi2l5MG3yg3L2FR8ni5omsrmO/mfy+/3Q/DGiruTmk4WVOJEsrWbNTJU5eSAiqnoFjner29

lnkpRTEoNWntckN2Yh29bMOSJMMENGojsHLz2gDcI/NOWn4S/D3aDy7sagf6AlGyCRULvmWaeQTCiXz3

QwJsE3RcnUsPv1yZHeC/eMSXfb9oMp5NvNchgMiHhG
SlQ7/J3oXv4MTEOy7XAjNudXPa6SISvBZR90SChHF4Y3HYnR14w5ohUXmYnltayIOqGhUCR2U1b7M24j

Y/LA6mTa3jPXZLD8VNajIW+nkX3s6CsLckdL4wIEb0tfF1xp9/S+V4l9RE+4rgy5HisVr+JKL6egEByo

V0lJ7eoezmVjcNWuyknY1UO/HQHyAdvrA7aPfcOiko
hDMkvf0frYoLkr4mViRRivkmnN/G/Vqn8Dlz/DZN9uYZr4Z1j5Oyl5wxPJg+AeQGobhlpLEe/QhiUoNp

BQEmxRNnIcIuwMmwI0pGXPJJi+KPDlCaeoSMbvn+pLbweGnU2+j1uxH0ChIZqoWlWT8+zihOrsdgVsau

QQt5ubhIWQM/M9Dd1udRmLdqVnf/xKqJ8zM/hovYf4
ifG1uLqkqtBOFaBA8XMQtg130D8RHvXbr9FtdK8GdGk/YPR6fOei+d5j5wPbHoDCUAbdSe0Ea+/t8unZ

mjwB9XWdmiiqdrOw1D/HTCZb3t+a9OvvMied3Zqbhy3xdcxq7AtT00khAvRuNOZBXqA6iEvyM19vscvF

TQ3kotjnX+nx+9AL2TI77rQtMJDYwA8VdOvVNH0zP8
Jn3T6kV3P+OR7SUdUggmemuTBf+c6qIL4slntc0JRFq9LMfFHyy+gp9fkvn6jq/xD+yjU6FDY3JC1wnO

Wu0rD9ShRXn1BDboyr1qtfBhr0+yjaYjk9tvTUgoifsaOFrnMTWUlweCPiyzRNoAbVlJVtUN4+ZgXT9y

uU4uudp2VqOFG1ZFN0+4WJ5Vt0qWsG0xg/HOc/kvN5
28+St7zgqhX4MK64fN52A5ZFF4xGs+1Srdb287qVv/m+s9A1Jqi8M1oKQanRoGw9CVJ8cXx4ttT7eloe

l7dHHDJl1UmxvahPCP8YeBFas+35bdF0fG0qo1lN57zliDhkbW/uRq67ostqsY5Lm1BeENwpv+yu2439

XbEbEADuEZW2w7riW9/+hzLwVI4gdmhDf8fRX3UoIK
GtpkLz7A9XrD4i1CYdMZWe8KJq0KB7I2erqOqe4dbGDhXeEJ9aRuvDXV881bAqI/mnYmCqVbR8KUt7h0

LZsdRdGVvoj2kqIMoh3D2gqOaFKzihcBYW0htYvNY3+YG7VDgnYGxJ471g5HOuu+HZ5CdWTLBulRPdCe

jKEL4GQP9k6cLMykWfYRE/Gl8otU+hzamTJgiWSeUR
Bs71vmbJ7jtTj6fMzvqiCKIXnHFZm1EmzKLMmBrhM3eqEiccTg6ARWgIBJxDix74ADmNRFSwJQdcb5PR

ZGyq7B/SQzkC3wpFH0US2CKnRts7azY09Gfz9KE30uXBLb2s+P1YOACczm87Z4Z0psOFP+OusJa+dGFv

BO0eEijucM8AW/X0qAkFXVgza+c9hQuvmYAgtwC8Xn
K7QVK/sWcUohwkAViGlQAOsf59kvebXEoMVo/w1UcnuTQVqZRjTBQns4tC+pMUNAr2d9/iZ0ltQLQK0+

AlPmAR5d2hRlGpghUvfX2QLkkIHlbSQi0Qq+j/w3n732KsfnZ1wJO+UMo5A6J/y4SOKZosvIpAZ64Cfg

hFLrx8jnqy8v8+pvzgJXhqmLEviSPs3C0zo1C22B8V
v7I0Cj/KYEwmLkGiBCzfjrsgI34yYS+homWRFgCOw/MtfwYA9ENrIB+WhO0tpCl5V335vkcOBXEjjuxh

kVXdREquD4U4SLZsTE9uHOZ27BHrNlOKGVWuH/YkUVcmPqE/u7s13gNc8eb46b54FFcaJozI9rJ3yphw

rEuIb+YxcRqRmWh7o8q1PE2Mj9qysaQpfY0qdBBNn2
xenq96LpLIjHIKWVHhRec7qffPBESjctG4fqfSvRUwcYDF7SWgqHGmZG9pcz0LrIrV2oV50E3Wjms4A4

648MaxcHn9bRT3TF7KDCzQWBLp5VntwajaHKXq3ohPKyA34xvW06n+HXkI3aaBqR7pqEqUk+gd+l0Abj

0jSyW2HqoaIaiidT7OlwKlwgofwX/S16C9kTAsRP13
hpgVSZbjJ7dLl3VC8peRvRRKHrK7upAKAnPAf9gsve2LZbf7Mes4ObtMY7301ub93sJf8j7Y/1wru6Kw

9F8b/A8nPhyoeCU/Z8wC33EiQTKUWhrPFlrlooyKWH0cWu+4z60niH9wfUQwsotldPoJGcjZIZoU0FpO

WftVZpFswEtIr/Yn8xz5ffc/AiZ9+Qypusih2YHn2h
wD3URiVym73AVsVzt2czrnEA/Y2NpuxNiakw/XC0buq0RYHTPYaC8YUhIV5Gg0Z66zf/qsvD5m4nqDKK

WfXHghW71YNcxO3OWMSRq9g0p6ZymBEtY4zepAgVMCDTGazFr+Ia9jg93qiiDvZMeA0U1NvhTUf4QAQw

61bQHLIGFR1KCXhZXE5hJCfXvp9g8qvo+G6D+HAcI+
R4tlrafrxKDwJgaqXMsuxaM8hGy7GmFLSlzyeHbC/Gv7ZM9bo5amcZzAAuWusvQavrPu5l/qLvUNg0/u

V1u/I5F7gEplCNSFz/b1JEwFi396+vIlO8srQGN9+mAu0OkIFYH4j8sAfnkMg47ZC/ypdIDbDpkQHCNB

IyXS6QwYBqAS52RhU4Otb1Gt9bGdVOnnA34Cyi2G0l
HqbPED6RDsNrnZAY76okKoMIwiVtuCMp6sOpdOj/mIVUDtSeE8p5OusSYUdA8oqlb5bT7KvFsSZ4LktC

ReiLfgtEw6g+Vlgy5xiHqF3b9w518Bnp56ICo5sQpt8Q04V/7YOLrR92v7+UB2IluoyKMDmKSD/3SBXm

YKjlSQtqOQtFBlMGA/DjoVcT9c84VlT+xBUGOlLWNG
DIebtPtC60m6rZ2hsS7GV4l/cA1dK0U1kZec6rpdAu1Qqtzxd859tQoEjorNMGgbB5m62/1FjKaF0p8L

/DHLLdFk82GoenzTSZpgfu1mBTC/F1CrTdljmDvkm8vwvaiaKTgAzyEa3nj9k5V99D5e/kslJo7nexhp

V5BLQd4BUWHiUbI6py5Eh7LtlFqEBxm65DZl2Pj4y9
Tc/5e71pPdO3yF46AWPfN8FySk4v+hjncVgF8hUqx4VUGFBN94P5N6FIQDTVUgtksvNpxdD5N/rfHbnP

Zc8/3z1sFLmDPHO1DAUdixugnQNrZ/NkYy23dfoKykzHnXRoVaqS8ymT9yXAr5UccIVFsAzeTpkE8YMO

g2RkT/TAgo5aEICxapmsodMiW1vBbt9XWjCGrpsbS7
oA+eCfmD7GVa8GCc582I/RHudmVCMGhiUnbAZdxghXF7OlZS2ebptNF/opTTRG1MjgZlJtFhc0lZ8TVU

zMW0+SB6GNdFSIi7FNv2iSMaE1uxd3x3C0J1sFfHyfKN7B23QYmb9VpwEWL7ICzppWx7AM21WHhXksBD

7oH/kZdMpQvtfjAH4eYaZ0hGB2S/B5yd1qF5O2VoiN
L8CIqouPXQg2Bh3bUb85FaeuYNUxqzx8mUzliug0f6VdFvBzPJbUfcP4is0EGwJsCl0f7Dhe72vU2Jx3

iPwHa6RyOYntmgpMPDzEs8s85KuWuMzz0c8BV4nXv/k/R72gCFF7/BnwLeiIZuO0ukKV1irGEpKuvups

yejnKwqHrjvZnMbpdUuqRfTp9HZ788tgA1IS/7kPaw
Sp9rQgOukjkeNDqvnljB67CCz+qskwRJhf1Y4MeauOcXypvzreGCn7oy+8dFvszAiujp1Y5RAnFLARtk

YJrPvcebbKJWvTWMzCBKdsNP1DmFW/EnB2HaZ+vwwXjbNNUpBHWKbOxVdF7zkDfucsiFUardWlZASA+b

tcjjK4hT0o/4dIk6C8E8A1wFUqozb3e1tE8oNwr3ad
hyjK+nte/nZ/aJCjQDcOV877SVaGAxgEeCfTjMxsO7N5xX+eXvT0cRHvZdA/XOyiJASkI8S+Cx2ChuUb

dAyCHvuWSPjX5SYiT/E4fDiW+NakOJNWqpZGiG67t35z4qBE+0Bng9LalDMJfhHY3UX4gE6Mi9KBBCf8

houalmEIbdHdn6SieGnm/2wAqLwFppWIXQ4YvBs8Qg
jUnhLTwHnu52RCxCxp1E3Y6LlT1ttalX68+jOXp9vrXbuBlWjMagAqt6dlnjLklJXKHILaCyAuAp1Mf+

FQ6RMbzqsQPbnjwnANl0fP76uGzBiebXoMIq9yECbqFwpMsDw74mfIz/DfAYrc2n1ALbyb913ZDkcjb+

d/rqIvyiUthRwBTdeu6FuGGR2emKQbhy62PzMrCk1J
pkhBoK1rJzyMgPHZ3xg95RtY6VzLdJmqDtd1k3/x83k4ueQIhU84Obx8cZ7UoYk9roOeedl+wPQXlBov

V7wOoWd36K/vQV1X0TWPEv5zE9JcLCaenqzBJw7W8wuu9TpmtcTFjiFBfEno6xWfPcchaP8bfNcag5qf

YVvU+OYhCkczEfBl8YnAL6mGiKcxpBRt3L3tTZMSPX
7MjbPdKDkedfGUoy619bax9VrLDxOrXy9kUC3c5qB7lB7q0iisCYvxvDrgOb0TRtxTcLwpfNfMcl3X46

ZVz8qI/D7W4oA+NlS7eLCMBQfHw8Fx9CfcQf5rVUR7THe9kLaCE5qf0dZaX0EVWvpfVS8HraplKHbBf9

pPKLfoX7iCcbGGUUqVJelIeB9WNVb1zqxAvo94/bFe
U9oyI/JYz3XlUpZpcFW89qFd4iQx9wxiDudjha/buFgh4m7AJkyQtVTEkCpgknle/N9olQoOz3V7Ar4Z

iOmkZoeVyu04kOwNM51OE2Ye4WTNq0LHY/mnDxic4kAcQdaWg+NprvgewX/PCaOIpSe1nFHR0Pa6/gxy

KhoK3RYVKocjEU3Cxux4f6h1P+YBz5hBCcKaCeLS1B
Lr4mAJK5cCVqPwI4aRmQ6TC+vvBBKraT7+KR5+dqJbOTLVQgvhP9jEJEiTV0pmeH44Ad69XS3egZdnH3

6kMuBcOP7vMqJ+rzfD/pXOjt82UuNFXq+1nj0HZ3+Z+VRuuXallhra7gzYEVGhMXxfm/uwMX1vrztvaX

sYwe+3I46tOviv5D6vMQiZ2xUQCOZe1OtLlUw25buU
8/zkbbQc1xqqDm18mbAZ+H1w3PIFJQK+SllWrhvUg50w2MB0AuEewCAzbx/ZrrrQ7hvi9phDUXr7+qhh

mRJKq/8p9F+L78JNiEpP0al2ZftCEjpGE/AK/yVIIQnD6K7k6S2OBlUG0sDk6naU0VM7CMP1uxi6nzir

TPMF8DlR60nDFYzjNvAael7HSqWvtG2pixhVvH9jxC
G63b8rvqhXZvRQCLH5gVzufMxXP162rKNwOk0FjgMrUpQRPV5jBM/gKdudYMcw/DilJPvqIuhaLKRxvA

C08LVzsRNO4MFqalrG6/PyRrnk8jAVQWyvDQ8BH83Fz1vsuL6phBLfdX8lyEgX2NQK0haTby6fd6fXmV

E4Y72LzOFvcfEmC4kwv0hq4xnEmjOjgaGvC5DV19jg
bBeo9DHdVyjVjN5CGV84eK3jkJjB2DvxBfcq5p1HlZJUyvtoW0iFgu3fTjV6yjmm8BI01Cv+OWsg1/qy

WqdFi7/pta2rcZ68Z5nPN+cwaoB0HXoHDQtmYf30TBqb5d4O/8StTdP4Q0EeyKWxkLMLxuUa5mYIw5u0

4DHuwRCfM0ViFBNX+lGRv9lbt69ATkN/v+f//zHcTA
MK3rtCFBRurCfQnOxdqFQen9d1HCzQQArhK2vaXmi38B6EWBdUp77l5XgInEoo5/+SdW05RVkG94G/vW

Rxl1feijRQwOCJK9unn8UvqJi33FuPH858T/hfbzxB1k0uop3lobEy/R9c6EyP00ITTnpyB1p+iREQdD

hc5LEGLGDQINnsJyeTbNJApEk2KWu7wTDfUflijVNc
CFXjTWrTPiwbxwroT+DdlHFQyzGWCgC7dRxvYIWFxk3Qei/yoMEvN+1FSJie1CELDU5Fk4zyFtFDJKz6

6XR7sXPdpDk2Bzf+dIsjitES5rXgtQtWCNkEfsbBwHUbJpI/9dtpR7oR4feducdh/MYp9V2bQ1H6+2uA

9KKOqd8aF2dJQ8fingbGDNnBpKyvWHVHPtQlobIU1S
SOyzgZdzLi7ebn0Aldpq0617MgpoutYcfvdNhpcmfMtkpKuOfgtQPASvZVSKM42gHS1t0qfsKtJOaRoa

xBuDvVdZUCQ8NQ39FL74zAcHsozrNxJ5uhWQUeOqQzjZ1oSZXkfgAjLeR70OvyVg+NMQKINzxovMwTXb

mTx2sIsj8gObAzs8N+W/zvxe3RQ+bWuxXDxfOCkbqe
KwzbXxMI4AzZ5wMm9A5V8dMnUb24fEQfnqlEojm07/BlG51D2XyhfsGvdrmafxduu5nDzV/6EVL1fY9I

K42DW+ThOema0InvhtE+gv2hHBT87dWAZ0Q5gci54Y+QHJCT2e+ykKmp7ivTKclLV6SEc6rGYjQXcgWT

9dkbaSy3Z+YBE3pi6BdPTqT3cd1XmCaz2Mt7uQJTHL
rf68W9ndva8W2vtFZ58il613GRhx0aiCMkTdJpEdA95ZmFnZs0WdOmy8EojCDPL5SmyaBrlIhswc4txL

lPOfSxhmf786li2QB/lCfF8hHtWGDNOFBnzgyvRhm12/eVzXEsCRRNbmFUl1LOHVSETOhsRKaDZpc91f

ZmUWFLsM0OZbai74z+OVtzFdP8I0XNr/RiJtzvKy5Z
MrDAU0Qzg7UeJiU8rdwcQh1m5WV+N+znUAH3thzIUtiuhRc7INf3WDGV2QsAlxNa1FmmCAbkukT2dNd6

bX+CILzBGfaR3C6jK9wDszPuhzajuMKJLZM5v+2kxct2eAM2t8Pd/IDz6OkAx4/JKiyvn1fdruGTydpz

ovZ5kU8YPIfc2JRSW3ErYcR36Hi2fXcjmNbC8uXjsF
pvRlX+rCJUw9WuMeuEdfU2wOLfsLvq9MPOfSlQJRU8xKCb2Cp3PLStM476awoIPGUxGWLZJV9wuls0/7

FV++235YWxIO92zF8veolz7LfAiatQbgLf5wDqmFadEXoSD+dyPGe7j8Bw2gm04022pMmF+MtpqyeCp3

mills+zHsH4XIMrdTKozFTn2HLHZCa3sBnh3//eLcZfv8
gQwXI10SwU7VaCYS7ZcArjBvemIvHsy0Po9W6kW2q1GIXw7pvALEExYS8IYAIYmGyGRKEmvR8smhp9OG

aL+Ib8I6XGO76SoUd60+kY/hz3FAk4RFuJL0GLiKcnjJnOqcT5ontpSgpdJtxwpEOWqFWDzJilrizZBK

7g0XvmLkDvf93Li+vZV1nHvAJ/2gcl5/OTkKox8PVW
3J+AwbU8zKCw6PA5zp8RuB1w1g3DNe4m25MmjmFX47C5i0K5k5HLB/F65GOimUicB+leZzq+ijtY6kv/

nnikEM4zJ0dOUD9BaMzYx+C+h9pvohihKbCFmBQZdwRt56kzpHUogRY5MPRpVT1OhcnlJ7RzJ/mvORLD

zz89oR0t2YFWq/NVLrhQ/soksjyphBp9PTVfxkJXjr
vfs/cW/gy/uD8nFmOmC/rgXpnqvKcrRuzZRaQD4Wbri0fsF5Igoz/MqSYe3t2ixnzXE8/NrfJEHBfJ4b

S2/bHlJ8IPT7z1/3IjDxXi0wyX10lLcNE+wM4gPvv+DLih+DhgrY6y0m6SvjEGh47x0FUrtPVEM9Jpc4

ooP0WeYKZDFIgHiKLRPkQVfR/BS6K2VHP3YbxZzTsa
wQoXIfWkXar47SXBJDNXukDH60W+1alqj8vho3GNvZUxczgAoyhRV9+AWCbo57ufmirl1/HnP1n8hp0h

zZkK0WoDqKLPwdmAO7FVP2uwMrppejtNcRqKGn3E8rWzXZuIFu02hFsgfnVV2s9qvIFzm7WoYCOhhCvF

+zKTb+o7Dnigq0cOHL52eVJnH5wnaNBL/ow2tld6I5
Ho8+O9JkLaVS1JT/J6s9YF1zxwV3DyVzcETlCHHJwM/8IIJNR5E0VLF4YTzRyWxuKoQtkL0iY7u25V/R

WlkrNUVH/w5k5T50WHMM9l/1U7uTWoEVnPxG48LIUsTlTkINe0xaKohfhK/ew/8UceujL066vMF1yoPW

WPIpBnxC6krHBPVq1jzBewElCBQErVQJU6qYhqi4KD
dX8SPmzwJ7aPTyZjIOKJ7jjBaLF9eeWK6Ac7auQ93kPLUjm+xOb2KR1n50SOu99+FnZU/qfNKseTUcf7

daxFadxjDhrcgIIZWFidxaNvi6djxlG+cx6qcvoR0Zixf5PvNpfCpGXTmyXT0KHbBIv16DfaeVhPGDXi

AaP9xHysFRjp8zZ6UzVgRK0SRjx83RrUuVL8OjeQZb
HjkXqKd9d9073dxGnauwnn0K2m025GIDbg5k3HMctgHzvXc3VWQ5iVLDio72U97ohJ6qkwa6IqSnb42I

PYw5yo5WNYr2+UMwkf2eszy3o/D8zU0U3E7aTS2VtpvcySoeeCikBPdgK/7LmTiSxU2zh2ypBg6w5xh2

tDWx0Tkc0s+ds5+rWDUz9CUl3mA61ZRLcuH4IXaf3D
WSSD8Yd+USSDPhVZdApu7deoPAKo2RpH95n2lTtv64iNx8cCoeHY/rPkrIdvfWPvVoQHr5zza0Y7ExEC

AAH5KDKD369jIysCUFpQD1KPKV7IL7eYrRLMjH5YyOCfjexjad8GvuwjItGlA5U/M3vlH4f0ZZJrB/k8

/t4obPFejvssRp0+tkeZSbZ+KPyFoeUM2mc1khU5CG
UXyDKCeMVlSeAeZ3O4xbHQjW/RdCbH9UBWTpSUxQBGmpAePSZvUQzy1veLXHGmJJX5GH+ntBtPkm/iVR

NDrHVzfayc9+I9FNaNVfpX0PaYQG6vkJ7PfJEoiGODvPO+vVWUxezsGfgrPEIPLinJ87y/Bjv7rNLE61

KDtpDrsj2ugJGiZCkyOj+sAdtYTAMTqI9z1cSr9Ddu
2vjYv8xsT+6V7DF9/oaL+IgqINQztFkALxEC6p2CjlUp0mvAZD+d/aVgaxhBzH5ULtIQRwnuVHnxv/yO

0u7T9bMLs8kz6G1gIamplaqUHd+81PFjChq0gZwP5eVwqs7C7VI4iYp2Qqe10J0BCcEIA7XsFLlurvmP

pWs3Lypj97pTTa56rBoSI49A01pWduy4V8JxsEhwfT
i/SlAM2DkJ3UKHoFfpbifn3489mezC7ZKBcwv859dJsPzZoFNZVLG9jIC4nUT4SW2enxFD/STFpCv75o

Rec4+69VPXFaaEBzQLcJ4yM6fqPTLEoYyklHvl+vBkfLjUPAhRTJLdhw1w5deSol4e0x43VhsI8SKOJq

Vt+xkzfJfY+PIvB1KRu8YMzbC+7R3p9QGN7Xi2m85b
66TaB7Ygn0tZrDiqYm+2Kp06NNFS95+TZC+E9+wOhW6C4pgsT9A4rbnsM+QlWAiuBwMYWKplZsU8B6cO

nTJMlnueUlMJp+WKqqTXVM2mgyNVrSH/Bo3OSy3y3ZYz9GNVkXpiqx1X6qbsL2UhWQ6yWrgF5KhdsgzX

EN5J4HyYrwUIE4NhLlrYU8lDctbOtWvGVRAA829EsF
OX0EDdZ++XFZpym1luWqP+Dq7m5fi73chMbwqDN2xL4jOQL4x4HL9aSD8vVBLEckyDriWX8O6Y4ehdwm

E3/Daat51zEZpsOas2rgpIeuenJJ1MVsYvqFICzXwwvO1vcdUkKYdv7tnvGel+iXTIwtFpYXHaHvDXW6

nnn/UI2rmV09tLYtj4LX4egyrfGKkzo3DR0rUhC97f
0vt8ZQpuVcoGxyvIRw7xO+5leKbuzeJcOkB9FXSrlL3eTR6kHxrpGJdrBpSxLQ+zyglAtxmVy6Z4xsFK

jiWl+DMhw4Ckh4W+WMJkH8CJSdlKnB+mZBBVij1iuDOOgxcg4mUehZn0ZHjN/AFXcqiweV68+nv8Sgpi

AvjLJ/8qSVEQzO9Dcv46935Ffb1sPFAjJ53XGrgCov
vXZ+3crvIuu3PdQQ6Y3uzH4+zNDtp2SQKEKoCZDzrMyBaA1cxkqdmqmcoVPlf9v5VHDd1jbxA129tIxb

EueZSe5Bo/NITbo1P/oXvJAo0waiJjf+NKOZzK93WMOgqsJSHnngzsfF8L4Uc2sprJu0qLpcsBs6RWvW

8ho6QSCCn2khBGwjpYl42JgT/RwbgmqqrWZWR/r+vI
AKT21e8MwNY4iWMB0683sSZplhrMQOd+dndjxZ8UBbC35C/Azg9dH+/oJKjc3Df+vYGQvjg2WM8r+J1j

tEnXqgLztm+Q/N2ePHtyJEl+TRr7+GBq6gXo0LIvCOy8cf8x4xoSoG0sIJVRQdGF26wlmQhNMJBsPZoC

HZ2PaT2cbCkHwVF3+8uWs4MHHWM0tYjkQunjRVR3DR
vgT+3q64xt3Fufzq+CCT9mxvm+kv1MakOWSsDJfIMfycLksrJgyPS2siMrcQwrf5ggpFJO/kjoMKaG34

MEvWmfQnmuH//hiF6mdac9Hs/PXoBn9wixkfoXT+fzpL6aQsEWfcl7Frbnes59bEAwu4nrsI+puq76lL

VPz3+Hd3YsPROARZKJaNOqhp9DY/zIM2BNi8C7V9PP
r3WK79wYphrIUmL/s6AIaKnxa9CV2A6mgISSHKtjgrD0k01DLu63RORVPRkGleuRQSnSgd8L81i2AzcP

dZiyAtzwSiLMf49dtaXI+oyhpo8m7UTSK6AAZYijMxSxcf5+SI5rAqiGujn64wMWq81u6lY8KrgB/Zda

W4K0OtpstT1Q1iMTq8LZSJP3DMAS15V+tajQhRTSkl
tAYFX0VcFU2VFV1749jV8TqNdtwmYXiaTFxJSt9Fuozgc1xtMSDar/dTY31Wq5Wmw+ATdVa6zhzCusnN

EJKiNnWcqeGoH08QQ2sCNuQ1Wcg17JusKqEVRNO4gJD9AeLRVXT/1Q4RbEn1A6M3KkcrWiIf99k72k7W

3uKNwlGItZD5K77wzy071fixSqm/lgP4p+nnb+a3L5
UXftfFcBEdtay+FRDdkSuhzLSshkV8JRGqrwSbhYOHgqfdRc1BFVgIiMDri6zE8iFSnrRfevO/MuxHJn

gcdF5Jz8X46XYjTxHIm6b9Uj1LqVL181btte77a0/BBbzjC8Rvw5fw0BS/mePwU0Ls8wkWhF/OQi66zr

gWQ926Gpl+MgYdJP76i9KZ3Y8vEMZ8F881jnXy6rHl
tyfYN9tFqtptFGQNmqAIr0225zVlyEh959O/h6nAM0775gQNRAAobPRFO4wfnzrCPhAbT0SelS4CYkV9

iYHfX3r7Yg4OL7Ctdl1naJm/XhfSmsgW/bUYIDM2g8/+V6UzdH4YJ1iD4QLYRlVN/9yR6n4ftipnc2Nw

dLeq1cPNgwcqbOf1WhiHxx/g5hj7nFGQzUK8zeGAXG
whvKrKt6xi6hMOourgQx/vshKcvOGPzWlPNbE+6momXtvtl0DLBVqOuwtphGTiHoosrExQGab7TZL0bv

1OIjM8AehM/RUDfpAzOtDhSSD9V5Y0goTgBEtPKOevQe4g2R+sDQAZBVqevoR2LmyP/ccqDxbMNOghJh

iWTE7yrKuOb971gqpqkzI0nCtqrElmKojBA6KsrAJ0
uRNvRwltKBaIbkwpdCHI7Xc+8tOUeTC5Cxuo9Benf249M003rStsdNu0161yJDOVw3veHJo/68yp/uC8

Z/2GdI6UJ3xx10clcITXh9PvBmWT+wJsf/H2YcQgwOwM8aVQhClGdt4cRKb89BBDtGtsXB2mwNAeri+y

+I8+0av9Z0hrSvRFbu4URSwM7U+7+pgmIWJJOsoby+
l9Psp8iG00tobSr0xRHrAzgi9jLUHC4ccJLL6G+B0MT5LWI6wqcGUI3WONs4FumhZgvzsQQ0UI3b5bOk

DAWu3agDXi3dCDqHsBocHrVwblpcj/glAliAS+O760W+Twjy8dLv86p6UBPSLPWhuJ8MGMDawkaGzn7Z

82LHKjgp/DH9sZohnV+WOsPJV/in8y+iSuDkYR9Yxu
VdsN5a/JIwXQ171l3WpoKUR6dSwkiHCuqiF++s/dyhinRqn4M4GNoQ7fxcaNQ4sRMQhP8LA9JtvAwc7b

8PSxoV8ypW293M3bWPv4C6XapAa756bQCG8DJhR21VPbCRqWe/AThl3dEB6bxYm2rgTAp63lk0OcB8XF

MV714UJekj/aSQo8clkgOYNDfOJFvdykYs1TZ11QBy
qTcT93IK5DYfwiXNtR+/hOhNgdR+AC93o5hBiuXR3iJOhAj1U2ulhn44r6P3Sh9iy7sFU9SpkChead+7

YKZ/2HcYvPtnmCm7pr0RCMBLoWyqY4MmjEe7noeL/sMWEfspN3fFL3LALkMVZAyubsER3fJKuSSP/r3e

s6bD/UoAnV/djhP2qiCCRDIZTRkUAnHA9Rff7WXRhl
K8UqMFsY4RQcJR18FpZEgpSuqXe3s2lf9DmFcxqwQdLkVitf7dkqUcyIzk67ERMmA0fuft2U51NhaC4D

siK3riOQsjY6PPkmo9wzyuWaTznrLjYJj4FVD8sd+EYp+Ir43qNvoWfV1pStEqsS5XfU2F1Oq3sWi5YH

q2HZJSsHu1CZyRNihRXUTiWtADa3bvk85dRL3D15qX
EV8iPc/+/775xdk+f7F0eKdC7LKY4G8+ncySOPOOWi3nh5rCWX7ex8wpvTa4Fuy+ZXIqS9BvE5qnhR+E

4QToBrd1KBX8wJeOzntx1330FEP4YRmPeSOCkye410Q1+4QxtaGe6levyR22rHVejUZ02W5/3fzFSiRa

4Pcp2zgqSIS13ZDrOIwTYKXS5SBGgOOX6pz3z7b9kx
AsD35Osklb3hwXWVTXgJsjmIHLSn/lBdF6rUqfpwhEiE7/BgJKnOJ/C03K8buWfXOiWXznhls1L9UqN7

iXk2K7zY1zMwdsqCarySVvwRQvNMGlwj3F0FyLrmKnLsVn+aqQd6O7jyUMTsZC9JalUnKPqgSL8Qlqtv

S5KO34wyab07kqiUzueIY8uwqKdXlzB6KSgPevVylK
cnhVDYAgm3RD7d6GrU1LLHg9zzEolTTRuZB+AxqnuYzadvXWDC5VsGRu0Oioto/jUFEP42CGGqKGkRWz

IYngEGd3LRsluKwq3ywMKT3+MJEDjea1yme5JRwOsnj/RuuIy1SbO234sqGHoLL9CjXPRxHXrJFjXDZ+

fOyAI8C8BNBldkkoE/hOPq9oWA5VmpbDDyasraP2Dj
gm+RjH7Mn5/GmdW0aoUovEi6Xx9zjSR8FaobIWTX2M4N/SlnIQq9jz+wdZu4Cayey+MwWzCfSyDDAXUn

G9aAphq9Cag5bw2VcM2JTQeZgBrMiVfVyr9Yu8QT+uYhQuOjAWp/fx0zGLmY8yrwBcf1r4ie66w98JML

aIx9g6Mu80oYdBBro5Nz8cNA5a9UPmkulwf7oOUXde
vWSQ1S+2e/svUjim4+3RCCqsYs4OfRHbX684ElPgaRImfb1m/CjOCDtYEBtXMNHPVGTBM9aWuab96pno

FmBP2V/zikBmZZrum//vSnzKPPPmo3HRj/h5LPoK/rt3hi1uY9LmNGEHctKwCrQu3vGlwpXGsp+0i5a/

K5rpOTChCdtMssxKZcSJfetF+52FsopBV9i63+hu5t
tXujGt7nuoRvXlIpvdcyuMz7MH5WZ5EoxWmPNzCEvPU+mKL5A52sOvtGStdNipuB4h3ZsbxPnppuC4mf

RvtmlCmkMfIlX5Yvz4X3xQwIZt2qGJyCXxy5uJWv3+UYCkSzeCHl8hrcwP00X4Qk1i2hXZXB6D0K+Wsh

XvEznk6EtzYhf3+6r6cd5rkAGqBoonlKW1YUNiShJR
tHfz/q+9y8dQHjUSWDozV8kwkdRqsHJkOvT53ybm8BAe0KVgtVjHVPgVT/Ig5xM+mGgX6ZsgO5Owjvun

u7Pq+ugZt6ETieKVkH2nUmlCjEi6qoDaFWzIGJHsdg/Rq940jFQRQ62S1qXeFcKzV6py0KKDC2cYAQJz

j+r/+Kilo/PGGx5gCOkYf+muzxi9+aKHorQGH47VIEAA
JrpAv3UjeW/ydIwwCarOG4uyGfmTjSN6NqWeWw05K9LgmBjfPFOsoDriCXZDHDjs7VQipCysYqMbygsp

GX87FbE447VR+9fFT+Yo0xoIcHFVPgcP+zfP1vehopjgK8d7CGJoALz2qu+sETqckv6fuR4YOHgZybkR

NO09A9xwnL1GceWrNK7zrGg8U6yT2X3iJ5dBGesTJZ
6gzUoZhkbQvK2mXq1D2jQftdAa6YXkMm5t93Z6HveKSuw0Kzpr7dbGmqSoQFE5XKN+VXDF6+SpjzNfJ9

qHxmIptKUCmcKMtRDUHRcJ3o+d0FwCaT6dWmcIy9hML/Q8LpivqtkU/ZA9yhu63Z9N4I7Q22qGzLYCWC

ihmQiOXHCLEpbO2CuvkY6S4tCwB1B0Mb/+oNQfmHQD
uVk7ePM2VthGiANv160HrV45Yj7zH+qe7zIPp4E/1KwzWIB3c5XImtbmECBp7AMEGbbthNSxfKsSi+UF

3JU4zZNdWF19RlRSP0/ONOSSnOLtKEpZnzHNZ8dREPkOGs0spQJtF6EintCjC770F6bZQz8ijdxT6snl

T6Tlw8/g0d9YY5r4wNsr3S5UoGrO50Q+mLZlwBRdW4
kMxcxiXNu2O8Pc72alY6kFxhMN2JZeQfLfTObZJbe/nO1Md/DlwWPYhzNO0QUSNw+iMzUsO9nfMQai+x

bonds/epTq//rqXUKi9kV5jj7OXN/ScvTa2eFqdp3vM5vTaX8vULMdfnEIf8fHJOfPoEb6kn+VYZ4qQF597

Gqgp+flBe/MIIG2X+GXO3/lwuQ2KJyA4CtwYzOxzHN
R4TlNvcNxSuu0yUyjx6X/26U1rm0Veh5P7oWiQelvUe+PNNsUxUxvA3hcwsQHqPXh6gWmia1Ty3j8DZG

Q8ZMqUQStM81hx3jsRgZji5tzmPg00/jf1WzWnEnGlwBKYpgD1t4euEYpnl93kOn28L5eAfpWV/YyMgv

tYJsMfBH78s/TyBrKyUEey32ChwziRdGHfKsWR8nD1
1WngxXsJjUK/HWoAOb9XMaMEPGPfu0tI9nSh2jWbPCrZ2divWuEPnI/lYl/f6Iy2uVlPxYkcPGCnB8V8

GEjS4V3240aa3iKDXMubt5M94fWStShXWD0UkRT8cm81JlnRHA/fEe1tNcpcS/GCPQtoxVmzszfTscEc

EK2hvvGQLY/6N2lQz7+1CB/U0xtUuwAxapeTgPUpRB
kLI8YFVs0P5hvT6rvUhS2SwonQofIJkYZK6ufU72pWVBwLKPuJwurzk5xCBO0yD8NvR8Y6K6ieK005aM

En4EoaXYN+DxreBq+SMEs9LqUgyMdNvGqze++78jOfV64TB42IwPBejAMFW4Nzh/jPt2bjCNkj6ALOaP

AGiZyISu9BK2i/D77DTn1sM3kCOndXKJQHAt+lrSts
0IyQB/G9saIKDCo7gFQaj9ToXdLIZ6EtJ2N66uaSJ0ikLAwHCwz+KIENW508o5VJfYeVrAqnbo98Yu4Y

B4ngPNbSd2fDzMgAoy2F/wZuIs4c69Rb+0cCwVNUraI/+tanJZY05zw/smHQ0C5k2TpcP+4NxvHT5njX

/LtZQpBzKArtShesVsAHS//bMe/1KWY6FGNtlJ3zvC
FblTyQD+CktMGWvBjkd4OBkkLXQ1F5smUb7SqYFo4jCcf7D6X2cvEWBP2qsq3DOxOlwkU8BrA6ZeTgb/

y3JtE1LwQ8V8rgbBhBX28dRg1+/Wifgz47lDiFFfy2JTm/8jSZnfeBEgcz0mqGCQeL+ZqGIGIXUVUa3O

AqE6Svr0s+grHIAOEZtGe2E2rd5Hj6gf76WfWomm8L
ur74xyYUhFoz/7hMFYmZ4loihLAdVN+kArMMmheINWa4nXDWGYrKBxVkTBK0CSx13/lw6pz//qnNJ6GS

h2f8fzXz2OqUXkbcDAAsxa7BzJvxqQbQycCrHy4n+iwtUcP4oTvNvnQvXpmmJcF1yGpWnaEKTw0IqiCJ

Ox2xnmaIyu/cIvEjOMUGac6JWomtMNsMH+A0mbvD+N
wvMeAdDUs5H8ji5oEmsEagr/209pDF2B9/hfVuZygnrAYR5iDDYtmOSWVIgycFE4klYQGx2EdoUHpTlC

EAC4nZfjYKyLhvYudzb0dgsko8ib0KutBhAap91LRboIOx05Z+ax1XslfPVrx72SS1benotTDl/oJMM9

4vNjV0dueqzTJRJO+fZOpmBF+TjTfnIwxHSB+y+/56
lyb6RgsZVYNAEDpv1a17PIbSOsRwdzQ2mwc1jJc3iKXcl23Q9a1K7aPe0kUEn20DsvkoM1DVpEneLE+4

76F0cjarCWcTFuIP/p9SrcwbVCQsB94tNrG7C3UfGoAsYn9eAWE9CzOMO04hJbhg1Z88S4Q8e4oJmjcw

43ZawtpMoZFXgelpgu50qitWm0zsIOmsCoonqbsinR
RCmZg3w+NAjtKMV7D87fYQrkrzD88C+bpiCvOXv081CMPAGYd65DM/J0mTDo0+a4oqvQbCIdweKK139l

j/hvjlnHwT3YEfcijUqXddnjH1vX2CjqPGws8ZVzHY34VDHH7kUDTCsAejG0FeanUSCAYeg0pcs7kCJ2

J06AXhxmyjlRllEET29NoyvWwO9qjhsCDH9q0UbohS
rAqMIoDk6tPF9iZPlBzf8zclzlgsij2nCezYUrYRcQBXF5jG+3lh2YDpoNvr+8BtILIVtqpwGmI5B0NX

mrevb8zgta7QAIOpbgD0mqo4YQN4pnQLAjmgx6o9dl8wI8V84365Lixl+bxjZArozsUSPxopb0bA/9vx

C8arYgBkj3gWMrLdkQDFss2W0VKfmKsKJvVKUDnaJi
r8wUQFKZo8r0pqnQeqDUsrBh0VDc5FU90w6ybqwgyYRmN69224p4n2MHfkvnmZS/17bFx7Pd/kUVIeLr

eA/op5vLPmwzsbb6ZpC89aKBxlqa4q/ctxXq4spCfP6msqwdSUQgUr5UZlOKiCvelxd/xvjaDvqxQlr0

Dx16SrBAEWYxaKl8Kw4KbKdpIvBYHs6YSIt/mnew6I
2Kvt00pQw21R7hdqU47+lAdNXcAerF4St0A9VFrNVCxc344BMFItwAVicCxQ0GwtyQSUYeD7UnGMYB6z

E46EjW7NyIRcM8Tfa71UBEZ9QxK3V4Z0Ic5nyooycD0ASzNd5QXVHvH3P+Y9B4W9/XvPYqvPV37FEEY2

hFW7xowywAqqwDol7w9HeZbcwkhzuxgxNPy05jII1T
GoTX1wFjoMOKCu3jeUumkiBgo1iaZEabQH+5iYFIB6GnhMGxr/Aho2TBP8LeOv//tk+OFZ1HhG6DTVd9

5Y9jE2mjH8qX6VwYVpFPI7UvDBkj+08gsksvbXVeauC1UYM8kmbRgT3mstJkqarIIPq9dFBH2zrsAgnT

R4/yePCQ9ydCeH2/g6rLECaANgpH9Jw8B2O+11Twyj
ZGI3pPhYl1gDe43gw+7GHn6odo1oIPqyyoAQPzQ+NjMr4u+3jqqBwZ8bktM01Ddhew8JmjrwpFM8xbdw

FbYPnBQQAczjYvAMwSLsNlZaMH03xm8laZMa8htMd9+1H+vTl/mEFIuc3IB+RR4mgRe57i8AnGmdvZQR

hty1J40ScW0btX/DcUY8vAhEi6n/c5Q0ubftdnA4hv
EBKre8ZHqdr+AqzxLFyU6C45tezqe59+hDqRvyJybd15GfBFLuR6zMKeWBM0jdj3bqbWC9ZBFBdnmUIj

dRWA394ADljClouYhM2KiLd7ItZ4mYvoieO+dfrF+Q1h/27BaFI61fAozDiEV7eJDaJeJI5T154+UFwc

Mm4qQT0fjcSxZoWgawdigWRyzRKER0PDbDedFBSEIX
q1vinLaTkaeyGSabISBXWZcaYOzBwvib+nhsVWt/x7tLAeIh5bJ1I66altcBxMg1Im8U0TAh1AShVcn1

ze+CfMzxuWT80X6njMtS1dk0DpkpygSPdvDqtVb+bn/lI5Fb4ePpxC8PmtyRJdUUgojFpRA584biLTMj

7LJ9WjFEmKZdShUjLfvbwLfuTvw2jx9w5M7MfV8a2w
Zxk31rgnSkfr+VCzm17DFQX+Y8qufbhpocFj1WOTfFMuHWbtxo2YeQnXYPUUDrkcphm3gssqtvVCmrHB

12khLnE3MXK5UdRrhm4sH3Vqmlz4Bz7Io6D2EuiCM9M0QBlXoVs/qo3N0bn/erxaJbYkAKS6XBP1gtlE

cwSRs8bFuWZUi/f1juQDZatuv0Fo45zOiXlDK9u3US
eZf0O8E/QoAqYu/CCpnzNYfZYS36NApl74P4Ib6kUp8pVRfCxluWJ7qRn+6GKPl4AcKvIChWOYU6DCV2

F3nyAvncNEDYxS+MtPTECIBKve7YIssX97nJU4jfgN3w4pprDDPLlZ1xYTXpkNnbEIkFC48HOCf3MkqN

cdgIn1HUDS4TzfNJc3eoVvMBwJS29NwI1loMSyaejG
uuKeFEobWTpC643XHNbxhrHgXBHQIzmaSrntyclc+SHNc/yzv/H1aspoCSA6yDBv4havohgaQqkS7A8G

/+R4aOoE1XzBetFzgB+KEqhJeQRGn2AkVX8DHsbqzc1mBmsQJHvlg2IhlMIZdBiQRnFHf/dmdgsTSyxA

MMOYjm0fwfZV4Mq+y7pGVo9F5mHWTzFwWLiyFR37vk
A88KDhBzxzDF/O07ta6qRP7CNfYTb/CSYpnKRkF+2k2RzH+eJ13PEq5VD2GF1TTyt6JCEJ6G6VcO5nCB

yHwDGP3gvcsO4F6UzPL2rlsAgFqc06bAzaPrgMTAsPNC5FI4IA9owOj+08B41VDru0bl7hjlT7fzB2os

9CG58AeL8rsSKVnujz90iIrwTnJf7aSx+ER336B74A
5vzwnCdS20cv56RObqgOXgoIxDkfVE7HxR9Ice6D+5aB7D55sB2/QDisKkrfN80Ps9nHs7lJ90A/bSQZ

qnvFKrtA7/abV4GvOaueV0BG8SIfm+cvhQJHUb1S/UgiXuGQM+hEptdXuSeHjPsskhucKU/Sw/qzC/tH

iMoW3/yocNhCtgRirNeBehGlJh+nzr/MCdX7+b0h7E
5WMiiOlUiEnFrHma8JCjsLsyVzaDqmYEo/6kuR2fQmk6ycfVxly/oMT69XOHCtvTLdDWyPnHmwtcGjIt

Es0VUJfYOXYvfHLroTAiruXrPvGfZwiVeamjUOy3KEnbGDafrvkuVV9+seAPOH+Q0TL/hUsR/QYaf/3J

logAt75AgAlDVNr0OmycrNjOriZamCxeZ4SKg0Kvlb
DFByKwnd0NC/ZiZj3r3xtWd+PiIPqNzGddNqQ23AdQk5EowU7/vVuOh2+tjCoX7LoBoa7dT2Jod8Sj6L

RRY1SF8QA0B0bg4Ov0nB9WAVvMhePQMXGmMrDZ00l8hd3N9i7maxN6VXBYp1ntRXkKcMmG9MZfVD7giv

wokM2dZ30Mxt92viHXpSJ9zenTWCk+nvG+GjBbr6pe
o2AxmuLKPVkQSRCXe+pq/bkuCXhX1iqVS6R6eeLOK3j0HepWnhHknN/hxB7opgHL/tJHBUMfm6hMoSEA

ftn93zn0XrDP4yCj31yXdQugxe4dPWHr5XD3yK26ZUeCz54U9UDwookToI+M5b5mOiWqRA+ExOOwh0ON

vEq0il0I18FK6yHVWx1LdmRJCw/x9xAnyCB+RCkGy0
ATjdSwDD0SLzV1CO2SQTPTAyYLlXqaDNlfEfxU7VYRyqqqV4dIC4o+6JDiGhtkQq2E/FuoxbvOqs/D+1

bbjnDxW7+cQw6H3bpqbwsJO7c3qdy3jXXPfaVxUKbwMpFb+T/BBMlZ2yf2+rXfYBZ1Q0+OOEUYT9zsoi

h92uTniT6lILUjIyiEThnQhRN2QUM+1bqKWXzhG+58
dBuls8cfwhrDye8XeHTcqX9ULBHApt5uEod2qRD0wjrkQk3VADVHQP5JBhig97R7/HhiBGt57NAubkGo

ttgBcdHzx9i4BdxYssepOZhYwQziRPUVFJJ8WVyEO7do57gOXiSh9WVMbT1gQ1uK7Fb/fEOe66YzEzVe

EcJx3QpiuDUaJqvZCfzHGhqrP+37w6/prREeiWJSE3
lrF4n7C1pHV+sFXtxpoex+6XhSAWGuctkCnsRSHNirbPVyYWd4hPXn3x5ndEy6v07tG5cXHqA3nTnHMP

3rEKHyxYwvxq5Ps0mGe4FKpFYRUYlrA8ZITl5g0Nb2Il1Tc6kBG/ryiToKuUOOinNC+2LFv9yCgBJCq9

r2M24aqtw+rH/uGuseLbGjUTB0/uWvK4BWHya+XGEz
6x7xdQ/CxejdapjrpDO+tEXuKqk3QlPkr5ZAIcuarKaD254H7C9KgOihXtzWY9LF9U3W6Je8R6QZ7c8x

YlyiUiKrl/8yPJgWHpEfqcs2v7woVxcNSyqpf/TAEsdwCDjAU7EGgZlEb1ltQq5wabBedV3r6TGDAkVQ

6T5nmNHhXuj21aOKRnrXunnhs3LvaIXYlcUJZVhfwb
jVfxLgp7eXY3xdNY1sbg5wbOgRHVrczL0/fDatBFtXthxFj4L72Iy5ZFnv5ivNxhJ39OcNZTRbFmPPHZ

wC20COZZ6tkj7Y6M7EcmpGEvN8G400cT4YQQctmzhkTdWCJE6zD99eUEHcVReu2SanXnqaS9jpk+HmBV

Cs8PTxN2Seo+2/kbTZlBBk2F+1MVNy7Ls5IEP8Hlls
vD5zRiLxYDP6/rG0VRsAV5SunxC2WWrGjAAiMSupWiBP7ED2BZ+ONTqXNtAaiI3Zv3XYcSJ4663njhca

XxEgu0Oh/Gjmo2UkGKsmnyHazdHNoK5SZaaZ3+7e9Df4vFe134NFDStvsBe0cIPbEajBhQpSDJI8/VTG

Qny+f9Srq62dZtpTjiWqneBgpmbFRPhrUwCbDBtBO8
ZUQXrbIYtQ7IjMSkaJETeWb5Qc7gpnDffcZEo0OoJdKbWLpeGD6BckSvFiLIGaqOLYvhArWACmHKYSYa

ya5nzbrn9oHPkFxFMr10rjFdRFtsG1HzsurBFQiTDrXH1l24/MCv+/YhKEdQv7XPEkskYbKZd+XU0RGy

8iQqkL1dMyQxwbD8C/h1AQ+Z/6KeLD8NkEffceSSoF
3zfjFRcJQxeSLffbc3MRMu+U7he2qyy+j6E3HQ1lj0PUYCwkpYqRPZKw8kSKHDrHSUiqPqVslRhrQGn+

3Hm247SlQ+LG8K9Y2UosgTOiHMsgmiRxX9zFVbV6xhCAOI/4uodZ1ROk/i+Z37lvOvv0CXGv1/v+EMYZ

++DIq2VpX/ch4jdz0RjT9wwW9cswuMRA3DztT3YGhG
kUcmGkvwgCaM/6ZDOFcRPh6BWPulwQAp9XX6ec4xWPTfMOLVsuGpK7rl2Akcb5qJ8oSJi6zEslIKxnWN

jRfFSEi+YTaQkanfrA35+0kPawj0q76fD2CdHD5ivsOr6DnWuUgt7TSHl58R59Z4VlP2tOVHVaruvW/z

2+w175mCUZ4wLd//6Y81Cr+HBKD7MaCPU/2uODTrYq
ggt5ol0rsjWul9DzDLOdApDvmVzz/5ROXRCRhoBtCwxur1OEiz5l2S97CgDYbz+MgfL17EBxse0P5QcQ

UDPE4d5CKaTThjvvVW/IZZxqrrH8FCw/q9rvUK+85Eiy8C/UnjiVXwWkQI8AkVgOMCxesi1EP4S21He7

tXgFz+6e4rQKTohBMUBzRwuepYYePCQrsiBSejL/yo
ww8ORDWbhnACFPkNOtHDUcKuCv4r80X2AbDMLm+N4PKKV6lCUFxqpXId/5KlHHPMO/DjbNDVlIDvq3NE

Gk+B6VY7EdjMH0rU4ul+NycnJV0RqHcTI+dCEYOGuI1Zu4H+/U+3C+wWjtTyBu9KlnTW6t11XfmFfevJ

9rxxfFvLNjJtnMOoikPRSDkIfBtQ6pDRe1c4uLXetk
595gnvFxMpHSu4z2ETigzG3Ut9nX0EM7Le3FP8XcY3y65q8lOrlflv3etPS6ENRqQQ+x9qYApg1G2rGJ

RErg4YOzmAXXU1N9h+i4+O64FP3bO1JN7TCpbzpSBTYWuR5HZPx++DmT8SBlh7ZRVTxlov7SB0yEE21s

jPRQ/tOEpIkZhJPVoHRmM2bBGIgW6tfdkTi1Nv/kBO
v3tcr8rEn5cpgQ8HroNrwiGP2iMoMUR6BiM0iTXuiyit03y4TEkUNswxcYsHBtgLaWJpVnkegh5P+S2n

O+23TvPohuTob5hdVAIw0sPSAx/3DD8jRCohI5ac3JW08Q94YGfkiB46pZ8QXMInDOVD8feHtGMizIZ9

GVyqtbhRlkWy0Vrhi/lHIddZsYsVO9Oa0xxatDT3AY
gmzCJLu5nt1qtubV8cpz92oQJtpw+htNtvsQgcPms1Msct7p2EbigymKDHql195K4ZZIFCuprRoqZAne

UOjWYRBsIRecCJd5dwLdYaY2hB8rCI0JpDrYGm5k1JPrtUPSXyDRcK/Xzl2RSRksRlmrWYf4ZV3jwAJi

ZUMYjtF54gfJnRPvWvdX2snLmwn5vCe6eU6oxdVIY+
+X6a4KjgM+jSQbP6722pFg+J8sB68fihkQm6nyM37yMzTF+Y2mXtOWV2ALh/5hCxp9vCOliibSqAdCf7

uvpbZpz62QQsZZxX1FHW56U9ysjGZM23GVP3eBY0W8BClqvgAHsZAKVoqwRxu1PXnasIyr+00e7ZLIei

JxDMfv1tgEWoMto0q4BzK1fgyVZwkHIZf1Ou2W0gVI
6jUMzwCcEwMUA/7B6xLJNu8/E5dOHELqorn4U//dMSV/3O4Vy/plU3VJMr8taTsStbJvBXJhxvRdzRnA

jmLgh4Bxp8OKJoA32mvYdaHe3GSuf35rEgMm9RfqgAKVYpYm+9paZAZxwnjTl8Eh2bkhrRhLBgSG7Md4

wjyDLGlmK4nGV0WYJXkoGrVEIobkvKv4JQDx/f7JqV
qZIf9GjAtKe9JPlMOtUxwGWarDCCQHK6jFi+KNq7FgHjapotQgi9ymYXhApb+ONlvTaQpaBsvonQojTb

gz748e3Y1SbPENpmcl5AwQxxBZCmwM/ySh5L0GqvZdB4kDx9EDcplmdIPKl7DIEofMpP/YzM7lwclZAc

ZmeThhzkmO4rsmYw+JEh85+fZCL4uGyG6/QUfs1bPS
NMwOT7Qs3JKy9qwGVyN1jXDLFVJjk6rudPWEx8JnY1Aq+PVWdC9IMtsfGzdtuSVmoHaVXQ7cQkI5Nsna

uxp5DqqnRYA1j7oiyG6658w/TeWSdk3WXaF7FSOVo9YitfHR76pdXdl+VlIIW94K/6YW5QJmCmUkRc9x

IbX4X7/cXZfLsbfQKnC4zM3NGiRmhZ5aZkDG4+UlVm
w0SxvCioeyWEu48oyfZHl/sTdk1vFpLAGHS/pdBrK5c65tn5GoJNs3nuf8MxJc1eUfB/GovYNW5OVYkF

OJrNtWiV2z8CQmJFSyzPknCi81zNWlIt6zXQmfTgbIIIs6kbon7k6sBnldex0wJwA7uy/cOnSliac2MP

Z3K89nMA9UMwnYJ5vEfewJ0iMXs5WEMADNuaRZAmcz
fye5CDME43/KoMJ5z8VBoW6TQdx/yOJKBTpUKW4ecBNswDIRSGWXlPIyZA0wbfPbv5omQBhaqW47KYRV

c/bD0tV6RlbdAgLmI1v19q4+x0Suobf0nImzqX9meT4T8FU2iGDlXpvati40WtbdT/6G6EipA5nCdPwQ

oiZLE6QdJeN7WMLsdDikFRwTC7JR386rIK9aAClNor
9a7yi/E7HuWcj0IKUViZ4RUw7Tol9rD9os3RsVfjP4/LjEPpOdS1vAfKHGkubPxc8abLBdB98T1oxOd3

y0Pzh/slSYlxya8QeqfgiLKRFJKjxh6vwNX7sIhM+Y9oAYRzw056Vu9UrEEzkGO0vUE2HxxQQi6WbZbI

xV9Yil+PEZzIzgVMeKpKXMWw5hPzorKBQiGbc/0o0o
H9V1carIqVsJiFsf0G7Pq7Bk7CZrP3bPdAqJurpO/S0zS5swyfitV7EEGpVgDvzkovFD2qbUtCoK77OA

NgNMUOUCNrQSYiyINPfRR9Izc1d/Se39jbr77MMNYANq15ApZtQrEtf9C9LgheradO+GYcDdYxmjpg7/

hvxdYdAE1KR2Fv0KDVPp0I10NYnfX9A2v9cAWDjlBu
Uf9diSJ7esXAZ95Z4JbOfkh5hkg3/8zHHbl1pBqp5FS0mg3gmfabl+HZj2vsL1FCcsCXwp5vUhKxpRWg

T+dOxiJeGCA9asAnSWRI3mPL37e/yZ2AKB7TxVH2ZJM+c6wdl3QpumPgDT0mAPPJ1w7gt89vKqzi3LGx

683MnR4T726ltExXofzdEDsntofjXHbTVxr3QQD59D
uyiRNeo6thP4v9uW7C8ZoF8oERxiY15BoN988UWjE9DGJueIJVsYZPplELL9Mo8aNjR8jeS9oFlF+vCD

CTOj8XjRMrIzM0shfKMLszqk9ewuR0j1IK70dGlupSBVH9EiiBjmfJKtDUV+qptPmJQImEV3AX6ebnMq

1dajjmDhKOjPZyKBJrHlFJBcJXODYAe8Ba3lB3v856
rBH+qHvZjXq38M3pe7uap+paVMmDo4O7l5O08X0MVYzf2WL5p9+kL58CxeZlFyAsJeODOm2ZcV39hm43

L0TdwiY4vdSznTJJoW74ZtRMtTKeIgP/QRW6rzqKdL0U1BsvPP3x69gPlJIjUwXttYwmkwQ7b9xniiAu

hIWn0Fa3r/6LkIDs6rrcimPr2wfZH1hApmO6i9TF9p
rpIjInI0pftRksDQGKly+m+SVhbR84lbSiUddToYGKvwA+87K5wyzDaG3u4Zgl25aN3ZjCavI0gYtZdk

2+bDhW04/16iIDmSuiWiOSieg7ihp8rNd7UDqc17IBSwbwl/iELB3wMPnjt8Gn+ww9VKT1+SHT0+ycXg

QdCqyqJ4txTnbarVxjUokjU0hpw0+WvF2t1IZuJ6Xl
Pr1HWCQc9kPBJ8iDCz+wCVO3kE49KSb806vS/AJ/apA8v9DmyXg5pI4hXdoTp3SVahxUhej2VZ0w363H

5D3QrNkKOLZraD7jWKalOO4ShzyKNH818lgY5NALyWyHSrmUGGQNW+fFc3EJUx2x+vE9PwyHHWS2k+1D

qYbdiA1G0fQtZlzkvC0CZCVDpwFL3YBpHYY78MZacV
A177cQLbFOIPSyOY2wt6T0K7dJ2jW07ocerGg142wCNvgPM9sLx9XO9NekmlQeMjusV6BzrBZllvN7tD

GdGzj8RcxPQ1/Nkosb5DyrFEcm6BoqscCemo5PyW26zdiTsMzeZXjiL+xOmGW/w5pJOKGr1O/cjZsZRl

K89GjBxnGiv66Ds3TXzGsfLHK2oAbNjYAPAtJdw/pS
MnI6u8xb5bDnuLSYtTK+fZh2MDci++KNxWoDDYVHv5/GJVyRnxpiEriiUSGaZufxKSvAls5iQoqQo1nq

mY3ffzGIFUYB6J/VoDdvmYuRWmTTm3gXn3osohOdn2/EZ40nXNI/jp0bClV1Cz8ocasBl4U0sefjSzVE

DRdlOaXzSTI3jTVI858iddjGx/ENDPAHpNaf3NShAY
RQyEOOFc8vGS8YLwJ7uC56YWTij/4hkxY3R7gEbwnfBJujapM+2BPNv8gF2RK/8mdtDRrUR+9Qt8zN4G

dzfxJazskEdzxS8EoJaa7oXDItKIcitb3hvRUx0qHPZjyN48Sbo9F2EyinbGmjYG+ilXntiFSD/Ko/Hu

NKhVGcRjGLFdV6WZFbEK+itc/uEjwpjefrsR8NIa5o
Rqrhu9qhTab1YWJOJOGN0IvEEmcT/TSwkoX/t3wVuvZoihh63zQNUm+so1uaJVAF+qKKZXq+bJHy3e+/

cjxyrdJmRR3PNJ8Wd/q2kCJGf0Nxz/YbAV5HISr3zn7q9tuJaphzrhMMa/g5y5HiUpfoy+Nm2K3JtVnx

5Wsbmu1Kw+43c6ydKNclq1rYK4BroWsdy5Fe1WivY0
1zfjKfavxzhhnLlWYTx2vvi0NdLFly63p7Mr+baU1L2FEwSqsb16d0yVFTjdynoiIFP0fy72H4PRKv8n

4/vzZegKXS1MrVfkbos4yzLfsfT1vaYGCscRHHNM76edgmdNplgYEkE9t/rWToHvwbXt//J79xgsb0sH

UJ3RhxSLpXHLBT17k2gEfV+lRYMpnOZ+zTcWLSKVbA
G1SfncfBmJafcR7myAn5P+dFdUEkZTpP5GJ2UDd8E2D132OVfv/9sxixe0I3e0wfsWdDGI+P69Dini0t

9jQcRY8IIGQRd706F3A/yALLhY2JNIHLrzCaRXHjyHSY5gVe46d2DIgjFQWqurj7YifXeO8LPXZCalrJ

/UbTYa1grFEUbZ/c6u20Gz/lsO6Ct/NXRUv9vUW5l2
eTIGBoBYssL8hDll5X7DmNaIGPHZ9ojtCOfr0Xm0tZk574wz56LzTkxDP7Gcs9JxOz3xlw7JC32JAfd7

ahNGC7F/yBpAKFFJ87z/D9RYUIvLYAoW1V3FFzEIbdCQpuf7gFM9fgaoXvOMC6glHSi3PuyOb2Sz3ilO

+Eo+xbqOajwADA7ttvARx74uC4KkvW1PwHmQHL8XqR
wJ/UXoVR3Q0XCYjrC4iYtWHaG2IJfql+PnDY+ue5eA+TRbVEuUSqgNCTjHpyNfcBpwOWZC/KBbVkKSph

3vMd7OeA8NNTnuB6kXRUNYiqo5XSprgN8EFID09b0dLIFGOcnmrnES4BjxI63NoHwEdqSJ7Tnhws2qA4

2h9hEfaGjKaltQN5vifSlNqC/EvFReg1Y6+8YiqcZ9
g9FtNbu6U6xajZP4zumrh0sXr8AljxpN37mLPkU9hD5mWljC/BL1b/1grDTuuNht4OQqr4hXEEi0QCsA

SuUnKuDeJhpcw27Q5Ko5Z1mssr2h3MaStP8gIWlu1GC0yz2TWb8rPgXcsZrMDZ2cOUSnWUfw9gFR1YHc

+QLfLjTymuFgpHCn2SC3g9AvNV+JBlP+6YOZKBOwjh
zxRsj0m2QdIgooh8lljZepb4xId66RebSv0voW2qt6vyyqI9V7rvbfCdWUMBH+0R8dUzWSqDzx8B6VnA

+5cKx8IdK/DcTNd3BylX8JdgkuwHbLUkgjq0z81yjdeG001uwJYA7RwtTT4vmL+iS+lyFyvwpw1jTB9Q

BhLLhp3aaO04qtr+l/SDNuaQLdOX8izWI/+FCC2/+v
oYGE97WIcMC9Ua/L1MV65viL/nYuKEXsce+Tp+3xb6jvQJJY+zdPkJ+5DSu0es1ZEFlhPGHaFH70Pwb9

w3ut6kIfuh3NYcqR9gZhJhE6Yqum+WUo6tePJioDEuLhG16uAECrqYIXxmGsz/Tnbm6nmvoKvAVRXIua

wSzdobefz5R7t19JRc8ThAZv+M+s0GR2h8qt0LT0fg
+gwnSJt73dsPTuY04idUfHidPopsCd4e3tLVDERXsxdzCIE031VtNjjQc4CMnH7sBF6Cp7zrUl/2TE9f

vvDzAVS93a9MlW4dpAzOsWlcVzKx1oGn9xd06vklxoVMMn+k8TuBErSI7zXy80+6S/5+z4wd9saaAPVF

ndm9YJSZ4t82IqNdfufylxP1Oz+Z1oxhZ5hG9kMV/E
RAPya/CzN6lbKIbeHOwYIWXkq/GyHtC4HdBzLgbLJrxP9FyxsUpu5Tdp6I7HcCLA6awquV3ckwDBXBfB

Khrg0Qo0N2M/OSUT95Vp/F6K9+a/hXZRUyjUf1Ryb7EzihziZCo8v51/aG8mBakz/UGt+UE7HN3jCvWS

tBjaiYgZxVeY25khNxude1U0fIPws8HsKRU6/poH6b
jKSF+qgguW/khaGjKknCXVXHM8wT2aRQ5tFNt5MhcFV276gSibYBfG8sAp7ro6/1qGKR9ApM0ahWdAcH

a7EqQWifQ06rzahNtQgwwKg3rM+X38E6zf1aE4cGhuCzUz7Jp8Fs6IBiqKtMR+aSmPHDlUk0sMRZGM6O

syU8Pi9Kv7UZuGLgqnKSWCr8uwk3sB1DhRpqV+qyPt
jA6VaymPLvpE4gZe86ltltnTihih+QcNMpeKaU+/af3zyhhW2AJ65PUjT5h2v4yiTmj/W/PEa8G6uEaX

AgAygbLjnrgPxQ/tGyYd1TrpYLAp0+XB5mRNNpdddWSAzB0K/5w507NeQ0bX4tDcvobyInaXTmGqdx4V

hp54F+3fcO/Vk0ABIRJMCqNZCk9Bl5xCPwu3DHorCo
LR+JAto6ah8tH143E+f2o8D83gQWXvUbBAF/PJWwSPz9N/Wg+LyR+/yunu+Skv9S0QemPdF1N6kZ5e1h

Lduauh4sMbYBrEePS9Qs1bYScELkPj0uY6xU4oS/UuYuVxdMHHl8chWEOH1EGwPX2nu0fuoZdnV/m5MW

PFm3qYjHEA8VlGp4vpZof0wGJ3J3YSmP0uH+ntVvOU
rzE3ux5KQslTfPz2hIz5A7qM7AEYk3MnVAoBB2IOueiAaydt387r2SHvJhBQertaSNQDYbYP7wgzUyUS

prt7Dcw5u+ZbG57rmOAUaE8oCqNA1qMA7hRwU0lbgQ99H3bfgugRZ9aQ0gfIKYthHJoTKNSQRRHn1NV+

JbQJQz59DAY9C6naWLe+oMTKKvytyV1Z6iE6lMq/47
2k3c5sHr0MVcaGB2+MC3lFWxZ3Sgxk824DxIMAac0mCOFNFYBjnCgMK9sAR6Iym5EtVlEfQtEVItI/cA

lfHfuphj9RsxnmkkQrV/t8YhuRKm8vn5YLCLvnseExUVnx9fXke+KUsYGY9mmpk+b50kxH7e7flCh9mr

ZvQGsNucFpxGSTNAy070I4iCaSJWxDsB1uTliIz2Ud
pZ5FLTqBJGz2owKN+X9rkXsvui6ZPEjFaMaGy95N4cPHJ+BJpczIhw2nx0lVtteOrsBjIZzozVN4kP6X

sMkozAV1/eHfYHGm92rICDc8ixpFvoqoqVmtuqbq6Z4AKNpd5fsdJiCEKWANDszxPmqWdDXArQsKPv+P

c08nif8jPVNxtQa8f6z7S10Ac5e1IGK73yqpLGlMRy
PXevwJydVaz5hVsyQOIsrAdhZJ7HRzI2ywcvqF2awjKN4nVBIIrqUxW4zUuC69O87lFWNa2GQ/aSMmVz

mxx7fXrSwK19n18uAU+sT1OsmF05hf3Pz90ND2N6Kz/YMKrwHoxRonhKZg3AGeeVgWiXvl1wHsjhRi6b

nrCOxrvY0/rIhVxHCmP2z2j7l5zdhmDcDkHiMZGY0E
SOomPe8N+i8C1BNkHYfT1E9Flac0VKhTlb4UkQusIilgMw5EzrJNHiE/caS1TovV2x8jtgitTMSWGSO7

cy2mCpz5WbCjvR/ro/LeJhMA5ySUp9lSiU5uEPDoZ+VI7eRd/Nv1ep1I8SfGnhJ0CtmOA+tdg3NMogWn

Q6biEnMzpOkSRQZ0vDI8YjvZkSkjSFhapUmh+yTD6/
5z+UedFn79/N++NYCBxgetZczdi5yPjOIgkE7Roiw4et3q5N3HdWDxj+uQX3BygEjNHeaCtGqLrw7PNS

Y4elvvBW10t7jLatfepW4S1sLc9foQ8pGNzRLu0wFksFrjgRipvTj5QL8nLL9RtvrPyreTCLfOnrSMcA

wsVf3GEbkEe0JFvZHeGxlnhDWSkFEIoS/UylyOAMEM
QljIwPvukuBxmlAyBjn8x7RMKpEdGlLx/kFoo47YQ4fm9SiqrYwB9Rxk8Nquohz/sGCx1nq8hkIX1E45

fEngIRx9kAqRzsW4lpTAz5RfWzQ3/4xBAfg3E6CUoxoYfXFI7yGGoLoU+rvo9RSD4wzgvRh0ObBdRRT/

AMBFEcCxVsUh9fSHFyX3Ebi7yeW1ihdSuwtwG5Unln
FRWNg+uTeAS9Gbot0s/181hf3wv22oXhYOb+imzwGQdktWMmjVnTld2yl464bTOkG2bGQ7+EXfoVf/2o

SQyy18Hz0tTxBD/4FIuUaJN/Ymcjzy0va7eiOwrdYWo0Lnh4tttW+I8tmdEauku1KBbE1hzG1044YCxj

+uK5RjdiJFY85SMjiqAY9lWyz+jBTn+AngpGq2nqFu
4sZH4bljq5dKVa0ID4bbK1qHIegsXfh7llOM/WK6myloXYkmlzwiv8ZR1OTt+TZeAJ2WUJpaItuIPwIi

BdhZ81WhzTMFejN/9qBcmboQ8XRCj4djHJAbVIjVsDkeegK6TJZYsH5ONnnFznlsCPdvv+HEDc/AgIld

3VYgaIqdweAUJdLtU2yIYp6RFcc+eWmzFGPVRJ0xv/
rC2T8Qie5ReT6KaV/fipTuSt4SuiPbKMu9+jtcEXoj8UiAl+UCnwDByJ0MEyOIv4rLDM64WnYMfQEwwG

NW1zFHa+Vny+4AFVfyQWHbOvSyRP8OTS2i9MmYjRsJFxNBhRr3ffBGGLlGqCdSZxckSxTLnhJI1UrfI/

B+H84zDWoxamVqdtSMiESF7LxJlx9Bl03uSJi+b00t
853CVlXU9e4oT6PAtTVZ4YsHSQzJa/l7IsPfnSiN4hX0cwNipqPcHJ06rLL8CpXK3JMb9OcQrqP+V5rD

kkMMik7aTk2FVBpKnsZ0j6GrqpUGuQPO5Xq9LXs6/tKGMC4QJ2+qQa2kzKhT/MT7dpvpfyL541+Z7Ljs

p0+MOdL72DCfvEMbSF7YlaP2xEMF1tuGG99DOLp6jb
AViATopuG+oGV1r5f8xyTWzbbOZ0/8HoSoVn2ImGCTBr99irqMV0deoEbii4hvMl5Hg0XQGlMMf9R0tR

/p8tfHIJY9GFvQg+H3ssPufdfQB3Af9RiKaMmvIYcfUvRrYkQKh5HisDb8M4Mz/oLa8pi0lDTARxK8Uc

4ZL5h2Y+UN9HvyayMSTEbSB5bpFz5AxnRcAauIm19Y
T7Z7rmGq2gqdQrbAU5qaY0EqH8J9WdvQv5Gu5tiDNRoCJLob7iKFq2Fz2aOXLGrQbvPbL1Cfa9g9hhq8

LxH10pgyyehpqUjmIInQhe871bZXHWJ3M3o0yRr2GJ83dTalpdeZFcEuYd2zXD1eTUWG+u2r3F1afvfV

rcjdmoLg4dCp7o6XsoENWpcM1RKiUun9PZZ2BdG+Xj
i/y9G7oEgbtF5J5POC877MfTcfJR8EQYYtgUlvZfDYMu12rNO/cJ6+zIHtE9dwycvncgU2ASPcC6RylD

yO0cTxOWY3Kr82vEcSi3gMt4H7bCOXt3mXDwFWgo3LzALXC5MFZ14lfReZTmXYFjzRPmuEkEYqeH3vsX

/A9m6xht+hepcZ9Q54A/1CQUHq79qyUhBTjzyH9BXv
vFHk0gwOFpb7oHEJKZWW0kvDlRV63Hx0yfk/LlFYq2YR8k2K8zU1DTsvnx9RsCaUq+6YTPz0XMbSVThG

7UgCzUc66LS5gZ5fy0zcyVxLvzgHNug28PTuGSiTpfTxbaV9rIUFZ8U6JjVSK0qKJRErSfgeRByTmHz6

ur/yaftk8MwavpFSa6ULK7lxlcaweWiOzq13dQ7jBi
zXX7+QhWUd82IKhVNcw7yO6lkpzhsk+6VexAIaaSkwfDqrbGlEZz1F3l82f9WhHMx/bUEyCr3FGbjwP5

o73BBuWfGmii8tE5dKsYgYysX3ZLFpGzLRgxSsSnF6u/aTaEDG3ULTZ7w43snSSyeBzgzjZRYRUmzr2h

TNK96ttptHU33wIYu92Ozo9L+acyakxkL7FIvM5MYf
9oHQ3zkKP/vuPZhd2uHzzYjEPFzBAGpWu0ursZ24WHaTOqPA+7FXVh6AdOX/Dje5vwpI8VgfCp4gEMj0

ynfV4EcsX2+ZNh/MDFl6KO2YI1Vv+BD885LoDQmboTauEQ8EE/hjsDXAJGFG94dixA3uI/znHClG7oWK

1tNJ3sGLzMCXxkfmYVA1+sFuGCxb1JTfdoHzHBzSuG
Xe1Z4vBKmgZgixQjoAb4QpNZo/cLeAIWC2oBUS5zburn3trh5F1mLEQ2WlnwVyVOcTkqGnara2IWU4UW

ejTi5BUL/RBKfkB0s99oeLcN0WEzKaJjEmf5Xii6RWSSHOSqYE+4B1YshW6HLCe+4Vfti0aaTnESHpdf

Gr8h+1IzeiIjAZ7lAHof6vkScx95A9SSY1+apiWOsX
hwyjx5oY1CtOMc9A/cPg0o20wllp3fkDM5LzQwr+I31pHOsH7SWVQcO2unq7Wc7X86luERjmVwrg7fFA

Z2MgaiWwaemD66TMXeUU9jkx+NpX2l8uZcKrRLjrsxATY55Z7gEp4yUqYIm4VvBbrW0Wkh0zjYhX20Nz

B5AaXf/3hX/W6oWExsa2dAnCNNwHFYUpUCYdBEIQSK
LlBW9kU3hMjsVeqbao5TJZKucjLmwBIBwlRrqYH/EqZcRFEQBjlDtU93+ldAQ9sfNXwuo6ujeH68+Cqz

18jwrtcZs+HvBvminULn29FZqGdUsDZG5dpffv4YKM/lQtcGlG4pOsEl32nIwazByQzj0+8uAcs1Bqdq

v1qQsQ0DQblYQugmnQtgpQcErb1NiFwb359MDd5Yne
qVoHRiTF8za1L4D+OfKSU5Of6h9eJLyIN8XMEUjiJg7Z6D5LMjRBTLN54UXjDKozJrqllWGX9sJWNG17

ryClTod6KyFZ9l6jGhQJXf4krsP0FTKwYil7d0W7NcRM091av/gORzR+dOKKO5Sd/SnnJ1E2Rck/P4ql

Mb7ZY23hlhZNv8t8EUerx8rTY/WUANYZ5Ml8gz0JiG
ftAPS4rrCdhtvkh0LUbBhS5Bitc8zZNmDxnGWcb4lFXbJMyTwWwQH1+TL8p2yjiy5veuWh2F1coP2wYn

NGFrbqiHJ/caUSJ6biatrwPrF4jh3HE7ls9B9FjADNm5efgSeeVydN0fXR9qmVModgveE9tOsevXfOCB

dGqkQsOGGqYDVi53Ip5t8u/hw6NAttNbym35GDoq+4
MzGNeX3xSe1Zzf6eoLylvkr47j6csUYN/4a7PQIcpOfLA8lF+3t/3L7PX7jqQLWPU7RWLpeCq7Q69kTm

H9ZeAA/W/TjXfgpzypH17zdQ1CHcDEfj0LWFQvlbha8Xc/+UgIjXx9/NiwQD1jR/zVmrlfuc8hoLvUcx

qWSlqrwInfZtcLs3OdpoIJMDBRDjHdGvx28oFQzGvd
oweNbiZw43KxC222TR22RwE71C/AUcLa6dD+L5B7IAxBnH8VZy8X/OQ6mS+Pfz7KAvhtURWDq19c+Xne

pH6nV6m3/DQppRXO36E6UAcA929jXf2C0kt7xP3EctVZzG7OlqXQPEDs6PskF7rrlOcB8Boxjzc4T9GH

2eEFbxwdCcQ2qWtoSiz1+Td93OR5B1fYrGKPZKE8dr
J4O+XLYenu2RCxMLUOuFoY/LhAUXemsajO6vNcOII9rs4jq1eT6e24ZrY3nbzgMTJBSG2z/WyZcrW3ZV

2ecxkVh1arIPO3O10budzure6yDzQv0+99r62qG35oO838Ojb+8CTgrSerRxdRQ9XC6gUOv7L3uBGu0S

XtIGR75ULECPYioSwVts5jpzSfpnaFL9r5V1Jh8pYT
igz91iYFrC8KkilBzPEgPPhYu4+YSNj7dg6QUvS5b3PxXFgA94nnzpZa3qcu/rg/l/hk8p36Dud7T29e

m/qJRDSlXrv2XjaB4mkbOWc3znxomHdJl975sGEF5q++kHaHKAWCzajtIU8uNacQLu2emc/V8cQN/Erk

zMDccjVRZLCbYv3mpwhw+mRG/4ealNISPmGrvfJqEB
MJd8deWxbCVoHSlAfXAUIIwe0jNyM9/zRmorvp7B4B2mUSA84v/ufuIcM3Ce3Gy7V0Xbovdgtn8YHDLZ

jYDUO6NFyNKtE9crTflr95K2i6ledRRCrllSnZUOrVbysQNq5fXzoLG9D69prwINa9KXpoB5RR5dkiwv

C25CLPyNCzBlpJYzXJTq5N+eV/OgEa4qmkqd4/aTAN
U6VK3l050ScSXM2nhi7Y05062qiqR1VbTae3MWLUld8wnGdY0D9PbuJmyMu/DwDuOpFTY9tDE/3g0z6S

Q33IQr3RUfEh6zhjbv1/41R+rbfEWRiGEwxRHW6+t4nMU8spCAlOhCKhNSA9jOz4h0MTujuC54MouceE

0K0zjCuyf9BeTeI78KqdNvQsbD/ExTwE4Hwy1tPWlN
lzPruaBX3T1KqSrLmpwrX8xoTwXgWCKUHoixBNHGrf4eHJLZxJlRTHvG/1y1RnkQm/rMeaurQ/cdHIi4

8J1iwvavipW05BXDFxE9Bge4+BKvnUtmcBmDPH4ar6tah7JgvnJtbTLS/52uPRYFD+1h83buz69YmnP3

xFNypMZy1fasJOIkw7Y8WPDCYAGeL1zFzesPNLPvxK
BjaRl1kk+NEdbtDtu2nXMzDmqSz1QGrHMDBFVyj6w4yro3M88HdbeM2R+fUknv0BOoqKRQ0FGtpE5vvm

weHH6/NtYl8p3G2aL+kAIBR69TQT+x7EQIviMGJpqkYZ1Le575kMKvNUlvzWtXavC83o0hphopeNET58

kIw5a0qFsFjY16UtvIqZFQZxMh75g/HXm7OV+0hJRq
FyE6m8AnmbCle+Os0z4hF4TEflEhyLm+bEFJTY7BonzY1EYf5hq5HYGrb5LYXxf1+BkoymKaTT97ggKC

E9HuY+pVNxbaaZE2Rqy2xuT4UpExCYWiIauPo79qEqxkQG8V+8/PTnC3AGuKFRClhiRQvr3QSc86ouVh

5ZSyjuLt3QGqSrHNeA1fzGVkmQ08SUoc1f2wxFggOs
Ta7evq/NxE/j9woGB0fmRs9G1sCkgxJ7fVgYNzvERO2wS2LHuTO+sRcSVtwEXINJj+bQpM9mpBVfLXGm

qEg9mJCswAqNiDm5DUW15C56YQkmXHTDjwWOa7WiOFiw4f3ZtDIDqwTGERBKwvrvtlVdyHXFkH3rhujB

ABtXO8X9g7nz4srcNuBfwcxcZ726LVePpeaW1utnq8
NJ4/IquUkpIu/WHxgGbs3IWRMFy9pxTjBeElSoX5/DWM5Co2VJzEC2mXxK1xN6LPsXNG6JH8hiG1qPcV

vGrz+WiFXIGoua1s/az/iBz5xP1bQ8458XLcvNCn6gsBB9lZ0EynLAw5SN6r6w6njXPLDEsUkW8mQ2kQ

Xqhqcil9sK1yk3bIKCvzO5KsVv++xi0bXeorPLvRFK
QZCnZwocqmRyIHrSn2+z1sIbibIWS1Zj1F5rHCN3grawvBpQ1ClVMLJhP7UX1aiYv5FDRTqkKD0bIlm+

R+fYTi+AXerCn0ZH304LMdilRSMHMZUn5afZfb8mUIRJnvtvPfdhsriza8bI045U7trA2iYZKYSwXiT6

SvILqcOr5Dd8PPuwPAFY48iaKjCuH/0Ic2shgKQ3FC
xkf7T4D1LM9FYEuPF1X4v7Rk0ftmKfqQCLgyLckEPNb682Ui8pqKNWAdmGNMHDPT0NeQF2iWsQFHU5am

DCWgDBW6/fI/Y+93PXPanUNEP5JpE2zmVHhPbTql0/re5pd/xJlgrV57YMKRKAyjGIFSRX3+RD/AGa09

JxbJmCYOOYol7qP0LNuVJpIs2ekVLaeeVArCeYJ5Kp
52kYmwmWMZZ0TFWXwBO69Apyz5loDV/++DKuaezHduom+cC8xYJaLCqnf/T7hJ8o72XWJ+fNc0aTvV22

Sca8PBd7KbSZFN1qOu2P6g0//H577jUtb/07LbnANpm321klmRXm8aUbl8+zz5L7O8qzABUctX2BvLdA

TZ/3k8ocVPGyY8c1s9nv8JwdvMISyGdjc5HL129R+x
g+G29vm6eX2zjfMBvmt86hfF13I8dgu4Pc+KrQ4/liWKLsj/rMwZ4dxTvCBsI6TcgwI3rno6XvIKi8G7

2M4Knff4PLOrq2SWBugohjIMS6cYz3dgnbPu/CsTN+uUkwl+b00Tn3RHHzZBOoH2eqnJDu+3zBDHVsYE

Q7Ad+Kc3zWcsdGTbLflsZv7vNrokF9/MHZLruqsQxP
At2+WQ8PRPIeTD8uTUa67OPoaV/Smtgcby9jNkFzy4zsTQx6mjhMnge3oZsBc3HKxjkX5M2aCZ3VrZ/a

nz4KOD+XPs/k2MZP0JBbUBAXGp4DlYGwciJ/rNKFVTyh4zCXf5cxXZ7spEjBpTUOQ3sQvUrwlwsz3gLj

/a6PH6Q/wpU7wza4GvxN47fUD/5hHJhZ2PZ6gjgIla
NJfBXFxDimwJadUnYRuGROilEk8cknr5tzdLYukyO189cIGpFT6m8nsgI7rneFf3PinsBkpi94yq41+l

G9IIfnpjZDvqvs0VM8nMv41C+csM7ZOlVGh+Kb7FUGiAsA0QB7ppPf1NHdElbjB5u4p3GCBdmrMxyGY/

0XmpheRFYEklI7eVASse6vbKtLUWaLx9uUzdGDaSB8
9U/bexC0y6XIts4GVmryp17JEDJpB2V6ilR4qpJoCbxmsvqrbroLd13zSO9ETdzQksuefP5W2c+URoYz

VYo+sHWBG+hM3BIjaDvVswVlBP4p4NhkeY6Zz5MCKBw4RMxCplbQVeyUR1Nkaoa2X2uCjXXDwDECQRMi

00l6YJPOOLByh6E9srX8gtqPBl3CFa+oy2KSa/3/by
+fxdLMM+fukJ9Fyuk4JX7f/k++/kzdEF69ObEY10tJCvZTrn+jtHjif/GH7Z5K9fj6uEPmXteG4m5hVH

C37OR8sP7NzZlWRPlLusW50/+ZuB21seplKA3SwGpMLuwD5/9NV//fxclMbbuwGGkl8euWfg3NodwhvK

0MST3UowZgOOfb1pNBHdeH357nhWZRsWNKctBWdw8I
sGE3QovnPEfOPkveu5euSn8EHwId/B7M7q2OdpPPOSr/1gvEgU0FVFR44gAvM+aSWAO+RT3/S6xX6nqr

wsq0BB1qE1azeQeJqTIdZS6spO1KtuF8coDLngKps3LJdHKXtqt3wlyIEM3pjNRyfoEkZoXcP3G01Urt

i5hbe3PHu00DNRU9oQJXbGqU4UK9qqFB48CBb2zQRW
DLJPvRfsGX3GGsVyqP3IJwrftN07k6/9i0sFwcgjMUgiBWBfqMP3FP0CadnDkd3m/7/pfYdwCUPkDr4Y

ic3eu8DLEEEpASdkQIdHqzQzRBlBjweiWU4krpGnoYhpqnh9yIiSTpdSjkVPBuqajVfWF5RrM7XxU181

wEdehKAiGiYaXD0hmcCw5zQnZU74TGImtHHW5uJ5mA
CKzfoOFvPH0BfNEFIm6nDqZQm+XHjoC4VyF2vcuWNBEzhBncBtvAiNqzkQq91tHUf7Jr9acxi8XHdfRR

/4uP/1qAy+T/gytxPO8IIm616jJW7pcAOvC84RhDyoedo/etDXHrSOutILkmltiev/hhACRjAS4DVF7f

sS27iGTl+5jFNtf8KYn/T2wkxByt/V1DmxvNtTDZlf
BJncuY05wn9lUlChnPVY83k5tI4kUV1FDTEYje/k1PfHMVUy3Ag1rD+r/51Q2FVyhMLrfDJtzFHMHK9J

FXQR+PjqjbtjKN5nhBP06n1bBHgeGv4Owl5U7p6DruyS9HxxMZVt5O0m+mXcpDJfr1coeH6usWE7WSrz

VyUmHnCSx9h5pQAMTLUxOpJ+g3wcf0U6M/mpXrN1B/
ry++cy7xwUi4nqba4sHYDyJb4jKyLuzH7XNxg0VzVT38RDGE+EmwWkFlNxmV0ld2iH41ZuJ/zkm0BCTI

Pec2WtD/BXBp/T/Uwg06J0cO1Xi15Na+MTsZRLipebN+Hpcwi2Jjojs+isKmU37KFHlHVEJS86FbbB9O

BlYH5ncZoCEoIPelw4cKUDbG0ViCwjqeeyTcTuEpzO
LWt52j3+tpA+E6DlWksO9y2BNauE87Uh06Ss0jVMN1mrNeOzLqJ89TB8jOeIRSGsjvGAQGrOil6eA842

tcfwRYTLzo5efqTTr+5RjFz4aVV4OqXeKAzGZVZI19pGZ/mdMsdpCEfs4+0rKwo5/BYD+nmSjT18f9uV

7dTXajMxn37qXxpwn5vTAqXhRLRc9aJWMI2qHYLH3B
1pp1+vbyi9Agq1X8cfdp0KUKSrEtFz2XFrcsIt93nwXQr1XT45R7aRUQY7LaBq1PnJ1zUom3saUzirp/

MbLN5dA/4vANiNnjwPqI32//CBvu2GcEE9oZljQWjrRgEIca3U8d4MvYQ6AJs4iiPu0dTery8/VwtfPB

mkeFYtZQHdnrLcQYzPo5xer2essvKJQkA+i6IxsT8+
ZXKAYQ76UvZFWug4W5tcVwa5WUO57VVYlMTD84MD8xNHj81qsNEs4/s7hIQiURjthPzpGH4MJ11G3OUD

2jSfEZZOnLxFNYTZn+FUhh8sHa6ujQ1Mk3CGZmb9p/LHXaMglsdUjwa5zt3sSP+XHHzGo3eKQ36691o/

9qQOP2o6+sv+qY2Xgck9NH6LgKP4ulgO+msUinkZ1Z
g/zIfxMWjQ30g7I12CG3CcGFABnxNZyoghV0qTtdsP4Bm3x0iDQnRUDv1/KTk3sNsBMzEWhxgq0f2mHO

vff1Nnr4/QSWzWx7qyMYMaY3ILwAKcjCWqGp8Kg4NXSNYeCjlu5HSarFPZ1RVDJcLKxF04TZ8K/eA8Jb

PhDqnuHUyp10h2P+zfwDLhSTY/duV7YGYHBMc7yQQn
3VjKyRzddOkm6Ad1BB1ZIx3yanQvRocuEqwe5id0o9t5kQenCJYIY9Snf3iINRrG20PmB9eYenYT9pvg

GMoGJN2kbmJA+QCmGW8fq4atvd+hmwOMRMxtEvx/QVPch6/zJc+OR4m3rmpUKDGSQnSao+rvrjq50ADh

icg5LdR2hcmieQUZo8bBCl4WTTyQImFFrYlpJYV9gN
I8V10UPtkuoA56QRPtB42/d0wVpk+JfBmqLnXLjyAlP6RdlcrBaKAdJxrz+zWR91Niid2V0O2wIBTIUv

uRyRK5dBq4e7qbMxqPMPq0vR3U7rQRySii677oXH5SsO6sHHvNWTb1/yo9TGr+4B/p8nYMGVE8VFiftV

hc8jNb8UeljAUW4amFLwD/nSJTodJomfN88NpnPZ9I
8EPi8VjKRkLi7noqyLdxVr2dWnTtj3jjvtRho2PVI6GLMPBxB3HmH81dwS4vsBC+O+pGOF5CN1kpuaM4

Bjn/BBD44AF4MhpzcgK7aM2uiqzGtvAggYwF6Sj4vJvbc6/yykc10gR89FGFmaeTgJA2x7ZG2LbZnGLJ

qoAplx8P7bnsuelzHCY4cajG7BaR1YfSmAwa3CnNsE
A+I3/oHA0Zz/l7Q5IXhkL32Nc6MHchZhVkhcmOEL3AzNuTlpP9SdPEFj4JlUswxPpLWHdhwas0TyFTEQ

7/+Cabazon/l7BuIx9121qVS/au67ua2D244oU62OigA9HRJr4Ukhb8KCrJKzpI/kHvKP+M0F7DMgP7Wlgff

sRpEVsFTFKTR4U1o5oEhvtp32nZK9jZ6yBSpADT5Ar
v4YGC4UMOn+fMuS8YoqozrRIV53wU8fjpuj0Xv7mKNgf0qXlAU+xAe1ctQZ6+5LZgeAPU+KQbT2kIXJJ

7xt4a4zRfLA7y5TOTGQok1mR9YhxCsDBtctyQ6LcQsTKNTgfxoADtkhmaHFdyg2mIRKS7L52RUWpbMYJ

v5y6BM9c/u4WND7mkhv4WLe1nbtTQFUqwz6ohUD+2l
xMA7dW2eH21311rirvM6eMAJr88kGa3kO+2I7mOLaLXTHuy6TdffdK+rPcUXHsE3yDUMIuBesT+4nps1

lBlcZRsyzP7DvBZmdv82Y6Z/2hQI2N5AjiZTVkl4e83Tt1RlDA4+RHbCp7XiiPlqxl8bjz49pPFR83V9

F7c3006LpRCysn/FFtndf1wsHJCGel/tnuVMI7S53i
7P7b5P82sMhjK3SYg/JLEyXc0CZDKKCKrkfxYOuAii1Z/q5/cIn7iQeHLYdXMhig3RK2YQX7Y+DRf0Xi

TNtu2TocjdSMGvnBjA9n704c/uDHsJVxvp8hTE+23ccBp2WuqIgpmc209SFmkXqPX8Dt+IOFpwtlAvqu

sufomv0DiQICso4IYJ93ENrOxcW5lmnP/75Nq+XLMw
jrLg7qvl2y8MdMp4v/QLGVG4zm41cmyYDvaHhtCtXbEWYtVko+rhgOeNEzxZcrw/A3F67SsdTRWbrXOI

QtjwbI2WPRmq7kmEAxEWQGIlPtxRWXhfCiZWE7spVeT6KkUEBWm7nx4vz3YeZpcmYfw/aF3+YmbMfyqQ

rgcBGQmg5WY1RySJvXI3Alh3p3GQKbcB+I1AIxwquC
tkbStUq3jXvwjNigORFX9aOHTvnwfxZ/HL9pOaJ5wDj8aO9aFq9bL1PDoa0cCo2Q8fuimKkQ2DUBdHWh

zYmaN/laY0E+DvhaSXyCxMjk1Q8ybnSV2t0syM1Xtx8txbqJ+etHD9hTBrrzUDzh/T022Q4Q2qtVNJ8V

/6PfRhEc6FxImoz/5p/N9s27jod6nBAVjDIWKUp70u
RV9OrO6hCA4uhFwg00GcMy86yedGADzaW473sIsS2Pj4oirZv5murBsG816O4bjNuD7iB1hX/z7yKVCT

tuJQg2gDhUjS2a8ebGWPwtG2QC5k/FyTbyr8uOHMSUyeRlxy6aoqlRJl/JH8ol3ue0BFo+r236i1hioG

L644Mz9d5AnbE0g9dk0B4DYvPr00m5W2IaJudlkfTl
DLWvJVJXAwXb3ebwCysyoE8epS57aN1xh2oUD/n3OpMSpyZEuyQFiaYGz3I8Rz3lNU3iuJtRpSzNwEuh

3vbHKS8aq4CrqklXW6qPiT1mzz2bKplp3781e7/bjnWddxFLknLsBi36sz74V/3PvE+4yWvZd+4e6dXy

WJ9elE+8dXd9MIcLhYe53KQQH3fT7mCccZjI8S7Oer
tVBrcFcHVuU2NHB3Cm/ifSq5Sz4s/16+DNKEjMfy/K8ePUL0D+htfanpnYQPdB8OB7VPmSjuu6AMtkWa

tYs/JSvSc1C2LVU1/zjMl4tmX4S1B/QJ42SlXBComIszwwWLHOxnWeE9TQ7r2p01J2Fp4i2+Zu0sccf8

3g5Vz6Z9r0VSEUMDA7mP2T7NCRgZL/vpJ9B0XgyVEp
/t8rUd67hLT3BebKsudwGis60X2+d6jTco76qQd+ZLDYRgMTA5klDQxz7XiQUNQ6Fy4WRJC96ADhk+5r

Cdgn+wMa9SnXSVqAAQfGNoBs/rMmn+AjidCL0bO6LYguPHdjDKV3s0B7Jt0l6ynjQnKNTklbU6Q/g9AO

OVmj8jKzguh2UWFc/rfWEwSGJUpeC7oCRyMZEy3pKD
s3UxhWQtO4URJBWeKIKgbikv/mrgxa073ZCT3+xfeKVGaDFSw/eEY14BcopnVbuuowx+bzvSSWX49Wiq

v+l8Q4NZdiHSRKp7BiH40gVGvGpx6bC280LDLoOC5QKbkI1Zwy6UKeXWHXRom0NBe1g8ToEl5m57Ncgd

mQMW1SnzHoIs76gn3yPyiSLSb1PUBzVYO5eGELgTYa
5xJwx+ME0bEGgE3y4bWoBkmkUvfczYK23ewND2xOQSd7pNwEiJqm7oDjR7Ea+aCkINfuYbJUfVm714G7

VvJiyB0iBAdC4lA/kaEYuNwQHKpToESmPLD7EZRf57xPYoKdrUeN8WQ8L0/fwZVrwC/K1QU9Qk05lUhp

ytvr4RP4HRmlGsT3CbRead8Hw/leya71Hm7Bj+SoR1
lvOu+on0w42qCRYzK58XXTopWAbhccmxbzbZz5AW0PJKwfaJADdbK5eUI04stukk1AconiVqdgUYCR0s

rnX/D770w0lhcos28TcFbPyHAHCChsV1Xk993iQOBpk4EUmIQYfEPkhYKKqwXYN39E2qn5Ppyj1UHT3y

t/C2qwE9jFosj1E9Tm8ZsNArBm2kPA/F7628G6p2Rq
UxyHwpnYi4BymBRVVIOZgZSqla0C6gHp7HnLrQ+BIHzaK3qg506nxlUB7yGeaPVUpjDppvX/nk2Ou2Wn

MHmr7JW/xVnMgvrbJ+vs6RSzDSIH1IjkuCwPPBemzQF2EWQ0ctyUvdnt03IV+EpnZ3Qvz+47sVdWVkyP

5d8vc8biaHPftWj3q3jVmM3Hlf5j98gig6IodrlmBl
Cs2IW4R6y09sTm38ZS2tBy+/BTKcuDwTTpca01PEqC/EFQ02jEhmRlOV/o3WjkyStwogaDpW3xcf4Lpf

E1aVsNl5Xdpwq2/qCbfSjUDuRoVioksicNUU6VcXJOyDN1W8LwlXVHpQJQthO3fsDFonbIIJbhOqp3jp

ebHjTuoezqsBeZlvfIv5WMjHrTjM7URxAlfOk2+P5W
4imI5cwXDkIwiw6kEdZkJfuvVNYNr9+ko/mC8QdbmAans/0kjCz7mIazkUAUhQju/20BgVS/0wu0a1Gm

1OXaOH1lsxpTXSJXavtFvRyoWbnNBAfdo/1bJsgovgmp6Fnxt9huBbsyBfu8kQ946Dz7Y9T5PDNzc2dw

JM6Jvo0CFr15qDA4PAgmdYYeDCUAKmCymai9Q4WCmv
b/jLYM323i/2ebGA9ci5I7mm62z5xu2hakj5TZ9EVX+ignmRdVjAss8fLxgWUtGIqjX6P6Q/chhIuPqk

mQ1QhsFv33KX7eiO4mVHaLJ/6BaVekdG02OeZAC7ID3Qu+Wkjmuo/dBgYtCza8SDZrWMk2t98YN0RhTb

pSuwc7rv1T+e8pPbsw9qjgoJ0n0+iwNzhxskbwuxKC
nRDYsz5dW6BYCLDmkxCJkVg4SIkyZ2OmAOOaJkpl88fjEQw0vXi+lHs7WHtp+tbVQU6P3AZp/5IrSmGN

o9DxEvntX8p3eXIePl5QkKVCVa4q+lg/BH1rn2XuZ/kRLSJuhx859nSyIhASbZlg9Coz9Se5TGik5SDT

M9In3irn7c611QLERYsOZSK8u/GSBU2a4butG5hSJ+
1MHRlrrCFzeaRGI65YOwKRsMTe88r2lNXN+f17yIOYzGF0qG83nekoDz6kyce+b7vPJAjl8ltEs6T9xt

a1AjeCXYYrRov9Ck3vR3f+cOWklSVa9t3M3urztgqVvXgIH2Wf+dscqNs3npzdp1Gvpre2XLz0/D3eGm

z60ZHdPvD+n2cvYyRdM94jpfT3WjpvpKhxRjIHQ+L6
SM7DgDmEkFKF4ZX9ADxUQIejO8d4E4O6vby0tzyJrYFSGcrDZnvfR3RZID+WVzx8608QIXNuKeLzyOuD

kn/NIyLgesKbwh1ieack0nU1lVQEy3/8ARHZtt2IpfZys+4lyZ99e+XWhLGK0x5qwFcVhqSK2OG1NYpw

Rk+MLSnjoqdMxnek/NL585kDYbhpOuuWXUC5yoVhf6
S6V5QvHjEJ35s754kKr5isuDF54qsTP1TvAHTFnjd0qo5qIiaZBQvdrfvsdZSCS6vGYJqFrIj6YeMQUn

H1gb5BOtNkAbYMk+YLbNHaU8xzla1i5QAg5lL8vcTBK7RjX78Fg6+o6FZZkwOhYmZLU84lplKqeKHrey

1s8aZtvPFW+Ac2Sifn4Q/nll1BeEJAYFtTv9MN3Sk1
4aFNyVzVthO9b9H7zQaznCXEyP+sdL4dsrCY+P9w2GZrmSjus/ICdByNwYbXzuMiSN9/d9d1jSBx3WuN

9yo1sVFUhqQiMJUM6sRAEso8boicyWeIetbaev/80Rm3ryRQ9jrXFWABGrZTQwOr4RZHKW6wN9JiQGEy

T/lWdzMD2qrimXk+HVYWyHDXKnrcUmaJM0qc0i/43c
I94kqPXdVvCP6RF08REeC1gOOZehYvk7adzN1TB+pNPyFnwtlHXcVOg3yfkJGV/yAK4ZlTrG/TLpF6Wk

X2FrNfbQgT+kTBds0b2KCwsDtpEfoFcJqVBVvtz6+UF+X/yOYHAIYo1+otncuS/ZuKALCeH63ZywX4hh

SlEKEupMMQDsvo+2uifoL9CFPmv/pVfI/rixUc+fjK
LZqHOs400XmhXFTLpZ8GpJJVZYO59/AWnh5FjBlTjidZrsWVyDidTaKAZiHVPeVvL/hS5F02GLVXRjD4

zlSZpjF+ydP6tMEq6X4II7LHlcUqgD0lCt6kHM+M/TnFdMfHSfhO9r2bYK+kjAOGIWSn2kkZfumORuBv

/tiCgamjmnHWMd2cjlh5Ln/tZD9yw/W1K23pvwcdtM
jk1/7tA4g6qUsq1oj2XR4v1RkQJDymt6jPgHCYwCXpQ/kfCYOR57eIfFTdp4Hp8Ds75VfoVBuRO9ZlLT

tZNdh/kWOKy8A4TNeUIJ4G6MYoU+eYPT2yjTCIfuEpdlB1WjX7TTe0lk5uohAqlR875NqQZgk7m1GTv8

RrMr3ZctPqmvX6hbsP3De1g2u0USW529wCpJEkS9iy
xp+5M2KjHIZF8j3fjmEAH17Wse3jfQO+kII35sDCysiXR+rtrC0cgNTw4aLnh+522u7TeOOqh8J76Rsa

2EznfFrAboTzMOI86jdJCzCBtz1gsO76qnw3/tYuqEypzOuna1ZP92CeUneUn31YenEPWHJ3sN3/ZpYI

QwJp3s15dIL+jZm15PjgrqXxKxFzKMjxf2aBB+AODP
574aSo7vaxyCTqz4Z1bnLuuu/evMbJnUIYsEsEnYGkt9lWxq+Wn9taoComGvUv+5DetaEbgpUagiAZA5

lmxDdLPku6KQg4LHsW6S1qZ4eTwvNk2W5Zd34zSJ0wlV6PfG6lxwZ0hU1kXZOxI3q16dagD12MKoDhQ1

p+pOGLiF3dy6SMvevWmYvVj568Mp/DzYV76fYBdvlx
7vqXnuXB0WhouCd9iJh4prp3HWHXa4O3y2eS93WLN2rUvS9kndnOcLe3H05cMemdoCArlYnmUBl/684a

J1+GgfWH+JTf+HBduxg/KePnpDD/7ELLxu4eQJMIe4zivrOYkj67ZoDqlZr4auDIfC1i+kNy062YT19F

8/Tq4rwWPIxP5O7kY+a4wv0GoLConcZrI8kkcgfxML
lzD6AmYJ/mCjWWstG0k0v5PVWuUFaphoAxt+MyOoI8f4vuYsr2rEweinoa22a7opl/RGoxXk/pLOSIfX

pTHla4E/RTlzy2c/ppc9f6X/Fqsip2WEa7lA2Erwx7VUlxqYbpCAdCTGBo8YSstPDS/3rQcAmUMrtTui

Dj/M9iLEc9/FubVHY+gtBCsw/QSZrc0h/cIXW72Et8
y+hyvRoidoyp/Cb/tCpQHF7juqHLulOPaYafFDTvX0MwOjawDucV1IJsZrmPk5cj9b9Uyiy43HECRD5J

Bj5XsvyqEkJdGWmy0P+MmWApbgq4YCZQoKrlGsjsnBgQOvsqWAaBPxRqpjdztWd77FNAuQCXek1IHgPd

yRjU6tndtgX9vW2KQil47YeRMQ7EkSIiww7YiPLfjj
O8+c1AMb0HPCr6kHY00mc9mV6Knry4dwFRSnGKWsgesyGq2V97z33Qa4HQYXfGa5tsIEciJRfGzDQP8H

qn9gfdosmMsWIhrt31joeQC8om9Z7302WSMc2de4IAVZacaFNCM7p67XPi1YvLdcF4oQ/xTlEAaDLHax

tsubACGp900o551aIyyuHM21VRQkzSsBaokHvuKaGi
J8vmM2LBQaIFVKrj+X0HT+vYuEKF1cHis801d2+GQw6ItIcdRGyjBj1TEpgbnwx882d+znB9Ix2SfUHT

gyrm6WoxEz2wWtSplRdPGE33jsK8Kj9zTomqb4rMsa9EudlMU76KP0Uo/R88dicAZ572tbN8N+xo6nwb

qfGiCzImsxS1gHKTuEv62/dpt7hqwGjLH2kF8WJVGC
JJslvRVJICKn9voiIYkKY0FdmgC/xiZ90qfX+YOGdSgMkSH4nwva9CVPz6gDb5drYgnDGy2Pg6iC4ESy

pejNp+MeGhRTeXwTj6+YpOxq0xyCcLKyx+eG2YS6OtheenN0ZLhEaB7Ewo/kpQXuNQQzUBmCl4HUERTw

sN0py52/4l9kKcxqI6gaRMMxd3lZLfBDE9cuS2sG2R
JkMGUnGay+KcrC6B+kgm2sAMphqAy03AqnX07e0x0btJ5HGVIa+GcuxnsSlOxo8HwRP84oEmcGvRdvSS

gA75znTEYdEhjiOUuSyNCia0Vff34wcaUL350E7wf0jwamnH+9qIjU6PxwskvHwqDty25cNu9g05IQEl

1n/pSmu3+GTtFqMQcBkdrDWZ/VzwVwYBqiPG+xuhRt
QMIUeBpOYYmlcowqZXbrzY506uNsIkeN2bDMj7eJ/UswtYrHDp6Ob8INIrxLqFP8p8fluHkDM1hEQiNk

05CChkyHeROW1D5nvMEcl2mx1OtBpT7/f3pN1N0keBtrVg5Bjz9+dWPBNj9kMAOZjbgS91CoM83AsOr/

gR0GdyObdGXcpwcSasGE+cb37caug4/o0FWojqhv5/
+pXiGPHhy0Sb6B2p3nOjV2VY0ZcpdaHhZvw5+HHqRU/EcyoZdMFJLDP8T7YWVhD+FqaYNmmgq/HB01lk

XbpmcfQ9isbicNJ5ufKeY5arFlYsuv2WObTljQiknd4g9ueAjrGy0Ve43jZhbTsBsXB71mH3Z4dyipnW

aD9eHMLY6D28dgHMQ4X73aOXvgxyFINbx4q2IUWZ92
k6OSJikxGnSt6IpbFL5Tq7L4x/8fUDpUIQKkGHrKBDcpvcwtZCIEPtAWwsGs5uJ5MVSRMDxIOqJUxkd2

GCbK7FqLMuGad3nkg5M6iTu1x2SNUy+2VC18y7RgoqSh3vnSBkDaz3mrgFTvMpW4+cRook86zl5q86Lj

qsfcXKEsYxLJb2OeT6Wd5Qu7B+9KL42VGJEHPUTVbT
Ko3wAzQ1qDNUN6fIWpZNTtdE2jV3YcqKDhJnh08ORy1eAAMjVFYL4uV1ciKOwF0oe6VZoILKhA4stIcU

8AlKl6/bwGRrq6B8J9hDRfB0+Okyp8MksteQyYqQWmJ4m8t69BoxDfl9fa7drbwss2E8l9lIUXHLcXC9

6rMFRAOcmnH4oBPn2L/uRbDtGrNqOq++wDHuC7f9Wf
U8qsGnp+m/GQIHmyiSSlOpy/eeYr+9GSwj9+akv+Z0sBQ0/Pk/DoS8uO9XWawGqci8hhx6n3JeDhlgrQ

HuZDrwManY+d4oQzGMfvFU/7JkHw9B+8EzGdxg+NpLkhQQe+Wb40Dztp3Q8nwQNBJvCWrQAKaPSX3O4S

GucH0TChiYcxJDUaU69Esflf1zBj+P8+m3K8ZAeLO7
SfpSq86kCokRE0IXwVEZQJeXN7+HCSwvHS7YwLs1tB6cPo/FtgRmIFzo1ntylMCYaSpEn2M26cPKztNX

hqb388Eqqrx/Ek1YJeR+ODeVz88HbWrF/UDSZrw5eVAI47Vs35DJdkbB+j/GrTeGk4uwtzdktGO1BaTk

49ew9ZtercAX+b07Jj2QgMWIGmHbdCjXicYOQ4ZZHO
fsSw1H3pQ93c23njUgUEAUbALD/3mmwPCF0QdvKmyRXOkA9R/Uqt316guXKwzFFg9xeS51C4lm6ftYF0

xVZDYvFkBzNOXYusmQA10ipJKOrKM9hMNWfJ7dS5PN0Lkb4J3JbjdA+juPn+vHKw88cz23r3wQbVrqeB

3dQW5seH4FHJZoer3cay+cy6ekgTALnHZx5RbLzKOV
yVx0hY1uYxH6vBP9UXNYxVrpkKA0Im0i9NBtIwLVLOW7M40O1gT4VWOOJH2o+5BVzWtYoJ32iLq8PYbB

f68QH5KizMG9/NRhLLfXQchv7F8kTcOrgp2vUwOKYO9Ou3nAmhGMftUWUrC6ksMHow+B/Q0w+T2pSfZU

b5XWR44B+cWabNPfEg0eq0SkaAlSaXCfgfzCu9uxr5
DPq7XSeK4pJyU3TM+JlptsJT3+odojZ11LzDrh2ER7QnDRWLYMQu33G2WD4sVjHHD6TkyG0iGmLwiCVm

dD9RJCob4cLPj2sguxBlOOxxCf5GmcinZ9ZMGAzHB+zZe14RIuTpatHm7830Y7BIOxR6ELBO/rpb2tK2

SBMlhCRVNoDCfFo2PvL9ilSpdzoihlWdlFcz/JKO4M
frylzTn21sKnw75JNqvQxjJ8/t3gCCudd04qbLoYTi0ri5cTY1kMEpY83LQlrvBWaLqFFDCju+WWzWdq

qpGnOjUjCQSX6PFEgo/CxFWYaEM0tYK8jS3gdC9Mm/eG7hvC4o1R4wGda69GxcrqxDdkp2gfmce28gvo

XGxjY/KLrxsz0i55VSQIJeFz2qZKMOKr/eEB8f1u9Y
7kvXiz7FR/oRcqIpYmnUUuOY6XvtdnyyLWNzuLaUl+ddY+kIjPGOjxj5Guw9HkOvmRZgwrIOpejw3DR2

w5a1DFiEaXxeotL76xX2SDqCGMYt0E7oj1ks5eaClgwFy1gbW4UpyRBHtktiEmR076sNoZf9S5dwS/yr

MNpBpdrrFWkuTPbqfjay1b1m0YyAn6LDskP/w6rCvt
skvuur7sI9AlhDivxgEgw8ecLqmgQcuedYZSqeHpkoMximXPTpda6muIa2DHxPZIIyhHsbRZ9SkhB8lq

yAvbwmk4eNhN1pO9/4b1MTZSsAEqQNXl0AwQfE9Mlku9e2w4zQ4tTf/PlkhYBsvaL62lUvDaeAG8Jsid

CxchYlYJ1Q3YaNiDbYN6Hd65D3TscCN6/QjBiB0JvQ
tAqzjIOaoc+E224Wt0Pu8I2A/ppSB+8vhAAPHJslamJfiiR6Mjy/79KjwAeX9q/MBKV9K3hyzXGDQh17

3gdWc6VG27FiNLfX1z9n+mJnzjq2TmxV6/23Gg9fgcDfqh6F9oM0X6sAeJtA0sp8VjN2e6PP+Peja/qf

JvS6G47LO63aKYXj2oO6T8oQBb0S9eZ6IThBgcPuNN
rRogQpZ14mTAA+ihsh9DRn4ps2YcIY12HUl88zQk4BpY25K6d0YeUOC7JGeFRv4NGE71pwk1Vi+kfrbI

WIt9+RJq4wTMdSqWsEIfqMpZD8TQG/75uEN82BaYjGvPN3bu56eUExa3eM6kBqsUCCqYnrodUpJqotsc

rnKBD3dVtAiGKtzKh9U2azomZRUOqvN07WOaveY8dw
0S6w12Uz1oQAeMYqtuwcCGuiiIDuiDtMKGxXegSZTQryK9Djg43oa53O7QRvy/3IWoHtmJM9Ato3wkX0

u+LFnTC1n2BefiYucSRGQY5MioYALSUIf3+0LHcd9qzXsrs/OKM4SVqmiV4eIH/pme8cAJpfzwW2j2+B

QQuWTXaF1X71rO6hYQScEmIiH8/mHwjA/4brC0FHwQ
aKaCwY6aaxbVD8LMei3lO1kTpu2ruZK8p/YgOA5mX56D4goe2prjMhmC0E7XvVhGnhaSmpNJre16+rRj

ghnH5Io8xObDMzn1fyjko16+7PD07dZ6L2RE1KvR74ytqqynY2nAPZ1j+bTliPo3Q1Q6sxtn4Sq9YAJ1

AexmlXTviczKQIsdNIgK67dAASRBisHv0iqdbRcmee
RaKo6LL61Ou1NbDEF3VzRQcNihTm6sS2dXKyRliYHlLMHP/gycig6Kdx2dW2oOG1BIqiDooZGII5gx/8

UUJUUek4eSy8pe2QoEwajL6leR7KFPd8+dtl7NcKdJA+e9ptoj+732KMcB8JzRNxdKSHCddTk1WoTs3X

zzY6CmkYLmm5ErxQbLEYavowsPDtS+rj1XfYdFC2/f
CyntZmaTyPRPGq+C57m/DS/eeTuEdBshbY7vPbq6XSxF3JtQ0/U2Rl7W4EabQQxR2n8KD8mfTiXFTQgZ

anFgGzSdziOsmfFQuKHZ59btkKhSrV0LZ22j+e+jYa3XoNzoAjj1jw7Se+y231SXCw2o6+MiICirOI1H

TItE/xt+85/ROGgeWD+MhIXhPmEog4E+67K5w0kOdl
jC0t1ykvofPJnxD1tnfh4NitDuDciSjng6/KEtk/9ZZnPsL68qbpAm1rc2Uzn6MgMI8eZ2rV9bOf+CfK

QHczI1KD/B4sXFr9bxJgJCqOGyFk2i/4lrelLektFeRkCX5+/d9XuylVv+ZM/pwJ+WGlv9zCS4Nkm2p6

HLEETYtpau0ysxRwssuA3bF4wL3RG5PYqrtNzoKJlH
Wt/0M1u42CCetQZnnGa+qKmjnDppasyaAY/ITQom8s2P4mn9rcbEoTv7ego9jszofnllXeNErwvnTiiN

WCJFm8rbtUIOuO/7G0O98ZXEE/5Q6WqXJFoFaLl+bhjvT054ddJy6kz8mr4+pEubVmxsdkkuEPhlkF1S

/eskzaXxltjuIEAQoMX4eGOscvjLKbvY96G5WkjDgZ
dmr3vMsSWyzkiJjmoX5ctCmmSc/ulwXUzgxwO3KpY1hEI0H8X7uc6o7nqKWpgT5dhxTM8+ykX7x4lTTd

bLmHMblaMkZAGPuzwc5wRNMS/kqhKXc9Az/3y2+oE36GWyoQCd3ZfpJ3Ypr1c3DwVTUzCT12a7s/aZWu

u5rrS4bZ4X1gA68rMTuWufBJrgBmGwNs1NVXZLGx8J
CRpqPZ5Lf+gBsTziAmuXKtSJGcYJQBOhXaRSyjonzckPfdgfmwHU4olZ2jsmNaDC896F0IqnNK8IpIOh

Q1YKqmENbYp8ZZcAq3DslIhZ3fN5iMaCtFSRHquwgcu/ZAf658cvg4jhg//OaJYuiHdqTNlaOoIsyqXW

1WsgYZsUwkXZ6VyMCQZMjYbUuN8da20VUWMQecO1Uj
eaftnGZ2tYUet9Ntl/WE7lpWzcVH89bj/fIIBpZYKWdcebI2kU0hWcV90UKa6RRdLJknDiYDm99REN+p

UAoCyJj+cDmR/+nRqhM6JYajb4rU+udSnyoUnhrtJU2btnEc7FkfkO01gfmpFl2zmsS2QhSDpWIJtPxn

dq047/bDN8+tOkLg/rTTuXOLlrUU2/Hb09cvTTGw8Y
UzHAFxwc6QPSEgL6SjbWIJ7+tO+jpVIsXZpwmVKn+/14vMEgs+BXp5yJc1rsleHVfw5Ztb04i2amq1s7

malW9UU+UYXWmPCt/1EciooKzpJ9LWFRKAtnzm0jPJ4gjxwf/2T8dXhuIUQsrW1Ns1JSsSdzG3mLoFBi

RlJjocTBWoq0M75rhzBargu01/hGFyp1gG5ttyRnED
y7Bih68SdrdvO6wsfdcof9XPBRl0CgCmm0xFHaDZABSM6/Qy1xzYufFQp3xHkUIXW4PMYw/cp3RYfHAN

B7/rMMqk+eIlnR0EkiNZJ4e+/nryOt0Kf4RK6qBdLbQjmj6ReX0bLY+q/317ADyhioFdHy/vojrn/Nam

3JrnpnHli+F6sX5Jr796bz+YWx21RCvvO7VjzafRF9
kHmLO6iKTUJqmAVrrcwYaRvlS9t+77BLhkbdNReUveBmjEXCi/FnOdhsvdeg9zOZAyqtmd9bpFO7ZS+z

sT21An0ta0ky9KSVZqshBqR+1W1JucTA6KPm6Ji403nZJ64yT9bbOHtc5OCsUWmL1cWwxJBcHdhqsw4e

br3eNdfjID8CxUsgZbK2CaYK8vzR/+B3Lv2L1H9lrD
7v43whwjzIx8ossJnNWI+8/dIww0xTiLarx/EnmqSkLw9s4T9kFvwab044sN/YKpmfIegQHYfebEt6lu

A+xMjrAasY328sYJeTRc07Udz6KvqGQSZ+wOHeDms+czxGmp5KPmTJJ9l+UE3no6LTv6jDfBDextxsJS

YxGf54sq25x1Y6Nl6X8uEPBXOjRrHkTkOBkQkpiJPK
4ZS7BSdwIRgEThTNzhNnRa1P/lEgvZRf532HuNaWx37CZanTZWwYYr4lLyoRzgNUFSHmi0Fhx9aFd2OH

0cAbMJLmD83qmWx3duNV7JKDX8FEP+AMBNDjumoi6/2qQ/YU9yc+WnCWnZYbOUiHD1XOlHCjAhC2RVTu

o/+AKNIPtZ7MkCxG830FwxbeJVkBrKb8XqEoyZO5di
qfnpS+H7gqqq2UYxq6bHy0GeUZov6UykZ3kfgn5yav/s0pgx8HupEQT9rYpEafExV5wsb/R2vPEgkLxc

rFx2zJtr3ZjzyaB4D3pTbvSEaQNlKRjL9g5aAsyIpOqSYfiwFDINRd0uGUIBlB/adyQw9svu3/zMggz8

dSqJjMHUkKeaT0t8WAFMtztSSzebpKHrHo4IEZNhUM
qRKsvu9j7aK1z/iJ1gb53FvkT3H/jCaFU8rMls0zijPuc/qq6as5EknHjLnHHbkFRaTC8WiSPRGdAul8

pa21fyJ2Ckcf359MxZ/KvWwWhakb3PIc6Y+TB/u5p0/lFElOtOuE/vI1XUexwcZDQYvJXBGoSLLFjiKB

R8iVwDyvPO9US0PxtQZOk7jI5KeQFSCQVZc89kiSlh
iQ8Xenl3FMu9UPQehGnlDJxrXPq/ug3iobZQOEg+C/dURmjKHfzp6hIexzG8KcadB/DLohq+biv+CsMa

2hqRCegbiSkyui6USpCF1whxEF+iFZKplT1d8e9xu+5wCgeT3zp0wOCbjc+ocFTXVk01wJTxUqOvTau8

3VVgr4eUutgiNzwHgVdP1TEyJIZdSIZyVPJtZYK3DN
0ts9EBDLl1ADDeWYRMdjrb3ZzXFUq1AlvAzt6wjqCUb2b3u4fmNZ/VHqGe26eeZ5t3/ep8fTHzbeTBS7

0yxs6sZsPRYQPJz4BWIh6h3DPsTuUeNz8hWSysow5yBNVf64KnEY9q7f5rymiYJkGtLF9WwZeAnXX8mC

vRlpV4QQ7v/6Ub8Z/Dy3QsTvvYy9XBeNlXyQeIBNIU
UDoykqtFtRuqJSj22tJFqsfdUGO3OcXDjQ9QTa06Qpw1LH/KWEoXFFyt3OVsgBh3F4lsMbC8uvdhRhZ4

C0gxiuBiHQhYSQLSxYfiNRlYDaRZJESsYIO0Uwe0vQWe0/81VO+jQ+hIgUwDG29Q1eY4TFQl4dln0TZI

6z+ayU7ZyjOVbtXTzWKUF4oeApoRwoMxZ8eM37fRl9
9/DgTEh2MWh4aW/jBPeEzP8QaI0YOF39sp5uBF4pmXyDOmQ60FR6TU/XXOXspGu6esTHniQ3mWQ+4942

D4PFqyRVtnzZsSb8Wt8pI6T7Hg5n14h7QZf+/Ysh0EF8f/Knke00dwI8NxZpHfIyhvpgyTarAi8namFU

6YOU2h5j1eDR3adZdil3k9lBL91yzlXZduo4X745DY
3/0QD7a20aQbDV7yp4aGlvADUBFt8hAH3fNrEJgIICWZ5cgnxsnki7NkzghRf4dA+vO1vpE3G5JkysGm

uqACp9pXdoshZ7AdhF9fXyaJRntawm9XyspcJlhWjNlz97yPoV/QsrTymLcSe2VlV+61DMsLlXW4lVLb

VmFUj61J0n5Ssk29J+tkmZS7SoVqjY9myq7/EuUPTx
Yk3a/X/KlcYqLGlldd+hNT/jNBL0/aECLhb8vfV8gSDpeSaLXTe8/Hx++cSRuxLsXtSVfSbtzWWBFPsA

NJm4Hx1gNaF/C7w3X/raZ8g0AAcwdJErTxWiSM4zHlBcVOBfWosKEp8idowu/23NY7y/3cVkD4B4skbD

tPjjzjmCvVa0mqjlk6ke8u/HfUb6Add0K4d8AY/zXR
loKAhzD+9TI6m7B3VmLEaKjd+NSRbYcRZNtWfj3tFNrIZRDfwl05G+BP2rF2i1e+tD1RyabMpeNkjHKJ

SEA3pKUWy5rnG4elZLbP0yeLkadMgjGffTIUrV/yIb8l9n/ubbygyUkAVfjoQcqC1RfbF+rB8RlSlkBy

IGPTmyPkFagjim5zADAAaXoPJjFX20FkUuGS9JAqgj
JYR5OMs3/OupmG4msdpSyAiy6D5H/OBDJ7t4tUBmcoweAKTZWBGd9uaFYJFeIO9x8JkR88vatYbxGTDe

Y03WnKhAO8+wa2adqcZpGJPEwKQXPiYEbbUtnLmpOsaXOhYylHeQE7jENcvTHyVLW/t4STh6zktwJWO3

R9sjn3h62PH/6stjZea09r2+w5d/48GxnDQGM7Aqks
zXamx5ufuYUVsGCxpCuWDt7RWODnvAwR4uvXod/JdJiVll/i7sFOAUksx7knV+TyhGwClK3yb4p/1VVI

AU23ylgwMZkfk0c+9POID5Xd2woEtHnDhlWpMWAY2Ssjf8qzT4YIsILF6tp8VaCxxsA2ZWpDywV/rIOv

oC9UEg/uxId5MstimDPjhX+lkcj78b7yOTDcr7tktU
oQq39WtJlCoV6bAuq+1rVApefjOh2pvon6QcNHkXGsHlQFPoEvHFESfxqABervrP/gcfBZ2oXLjzs7WR

Qvi9oMJ8FJt0IrLxA/TKg53o2soknVk1udix1NSWWcZWM+dlkORfFSdqyjh+NhK6V7XQXzpBkv6ii9F3

7dnCGxIwHDKPOPqOcpv1fU/ycfvaYF1kU7xPaU1nMH
HmCr8uqtduK7+TRx6pBORWB1Qq0g1rUNYZ/ewTQl9cEz8VXqrAmRYUUJzM7YP6HzpuiZok5UjSCdCFJI

HYI9hOQWjZeqc5R7k1iD6nx15IMciWLrtaBS0MQE/iaeDkFM82LqLQSXqRof3MfOUSbwJCm0ov90Nk0Q

qtnE/8+e1lzU1cWxPU1DN5gDz/kSim079jqvSrQv73
HDJZhRU5X6CZ8aCGfKkKuOolEmX/2WqpuuA+iFTRLFAoCWggkH4UFHPndE/AEgBTlKu8QecCrf7ainA5

aB1byztPqTxDg8mN/D8d1zg5b9HCR9+hD0xVDzywdIwuGPW5DkfHgr3XLAC2LzzoFSjqDiJioYhmz1OM

SVZ+tRj9p5EBNX+2xLZUi2GszbjZHCVwMUXEaLGtzW
wrYxlcVCXsP7ytBseTFOinzkeZxpZEsiUg8aR1hhGLWMQAolobQ5qhxC93jzsgYxSl2zecvEB4ngxXIR

+Nnz/Fr7N+BffpQN68s5rmLR95u9ouknoiZq9If1CZXsYvwujLZ9+ZGmSqKPLrbMIVucbsfBPJj7Evlf

SWfxOE26Sr1Dj3iLQqXmWYkxJcY00RZ4nbNBzdDRcj
kbyUdVuYSAWBcTAzp2m7bVHmiXFTmnKRxmN95PYXdxGrvDxSpdRlk9dBI9050KFoaeVL23KaKTqXw4Z1

xQ3KYyIIF4LYTC4qQXF+kcGMDsbe6VL4V34NxqeHuXMQ/OeMvEudSj3HAs4i36krSm+LGis//wkcfB/l

dWRLmQ36OtO7k0UFSy8MiA+zitMPmsMPtJWK48gEpe
Nl3VlU5KcRD/23M8LY/2TQ0AvxQPwka/dSVWuPtXaSJHf2hLNFaAxdLMkMCMFvqMQZocERlthnj/TBuh

EmaQzWJ8XtRgu0ypIkU9HHzTxwlkBa+gIUcgNJa9Ocj06bHnc14LNny+PIWUb0I188Gu0QwAECOxJgkM

ZMNAE5NM7yvkHeOFq4TWADmmEwA0C7jnhVrrm/S+o3
Ssu3e1qaSY3P9N2n1iTbZbbXPaL1P5folMf5OFaUD32eXO7ICANx5WX5ckXMyf3fBjit3fZrY++/06jd

XX4KSyymcECk9f+fX0tpA0S0yTU/iyfIeVs92NA0YJlYOdyQknvXKZdyeF1Uh0W6raIY8sAkGNcdQb4f

7Af4HSuwf5BpqjigxtD8NPbvLcP5Ka79ZdwhuUpqYZ
b2lGaI7seCXq4+UWpCQyl4NrYsq65/mJNMhm3gbEi8PKu8GwYE9td9vFr3w50HbJMRGyb8UBUgxhE8Sp

cGZ8RdmJrxv4V1mb3lMV1PlJD7nB1V1NTFR1LAOWBicAOW7xsRACAmt7KM6XrVo9FCtfoKpj9zzDYdyR

3kbz51mlOecQMczSHAPa8amYvUo7Kz096aghyhNti/
IWJnHT26t10k/vCv5vCxqZD93QuaB/Y9jnA5ibM8QvOpN83Qo/bw0oSjyxG4QA9R3AxqhaBUvbGex4j8

dUvL93DBhFPxdSMqQoVB1rG+fW7bHwtl9fm0+/ASriEalF6RZBH9DtdHP6aPcElDxMhjTXRtm22izNR4

OjMJn2LlWeIoRAxgJe35KNmEGm0/5wr57xJXQPqWCY
/2jDWi1ySGX/goqNvb6886y/KQuEQfz/j6NxtxVhsu2iO3tFrlG/Ax6raTJoyc07OSizDAMbha7QNb08

YrtPd8WLk8zeZk6h2W9/HBo9KF3zIx+xV250AUuuUJOF7++85y8OsH4Wt1SAwh5wngTdphfD/wPv7Afn

hJHYgCeGXE6UWh5T6aL/67pUKsqA3FFPHSV8bnYS91
0sNz2wpQOZxl28YyzWFFght/sw3Jvks2IEf18xZ+PK+iXVSjlQBZTshLRbEk5fl7Th3ZyOhAw/GK9ac0

0f0HP0Wij1VTx3Xd++QQWgNydq/v+W9jntMxR+RvQSmMQwtf23nBC3TwyDCw4Lic384GMj/mA9JqimUP

H7Zke1kowUi9ZB7mOUTAd6ZaYh0VhHamAaaCbZkLPZ
ZdVaKt5WCSMD2P24CdmfFmYSp2zZfOg/euqk8+aAIIExfagM05SEagxVbG2Rh9CR10wivuUXQUwUTUq7

C7RhWQe8DC5VcXlgmPJhvPt3SxpqVOrmuhEVEVqfVcXtDZf82+oM/d9WCfZ++Vev1wq+SOiMr88vMtFe

Tz1jTlVinX1EWCSnI0km4qiecRs7Ius0Sl8ni9Bzk+
NflkmccoiKu+PEng7QIXxWYUf+imUsPKbJiEm9r92Y5FStXtbCK7cCBYKzYhXx6JAt8aXYDJTnT0M0Nd

uNKh9eZQ9GgFpslmGhV+/OpGw9og6gk6Rh57gvUmaFJ8JsBtK+1uGC03vym2kbi9RDn5SDh7yb5mMD0O

45NQpVslhbGuKtn9YEjze6MXL610PRSE1j6v0SNfSS
MQtom9+eepyRqhn2o2y0LbMsSy0vVIBW/4/oPCO1vdqc/lskvzlrrzO5BuFWzMVo8yK5tVt3stVhKTH9

y8IlGuIgKWHcvceHy1VOQoKK1wewy95v+Imp+ofRm9YW0ByRkfaMv4/8cuVMRdZq/w18QjvWMrtvpzl5

jhCzSu0T50t/Sl+EbciwO7Hn9FqCtK2kuT5zLwSffB
67FX46t0w5plBMPCCcyi1Bod2NYAINBuiRBHprMbmGFMmg1EIO5f9B9DrDIj3T8OnVmObGMlBXCQGAQn

ywnF1t2hz/Mb5hEIxY6g6/2G4mf9krP6cGbQFYhA3qxOtHDECwx8Xcu8CYJckyhUK4EIvR0n7fJ+j5+f

+mH/8CSui59U1RRMX7LbXbW9cW3PoVxqAm6CExqjKX
QN5RtyKO0aaMgfvXrnJ48pkCydpwnPJdjFMO2OXPJOu+EhmiCTohpOplHA9Hr0dHJzNSyUcljoeCxBpS

Lpx24XjHOTrVlCxkiREKPnRGRmeHXG8n8JBo/m+XGYu0/seHDK+NDRJ01rsjrq9SBybL5t++86XbU73G

BzOXKbPE+X89wgWAlgBRKvSatpebdBrjIir1Tm/v14
c4OJljxN8h1dlmmyIv26nP/Fnd2iE2UvnnU7tGlt6Kbtm6rT2xiA/TFQIMoPO0cP7KJmJMxRw1FvD9Zf

cb5owzpn3mfUWSuRy2uP+oCMD/NoUSbxd+4JCczhr9Jw0V8ruGS6pEss0Iu8QO6eBjCOGs7co6zdyXK6

MMvVVOuiTOY2y6g789aLj+dbKC9BmE+o8CEVQs6XYq
STIJB+osiuTmCik2Hd55paJ+t1rmhKpKLmA2qb4P3UK8SGWLiKxhMda3jIwdjMhHDg+lyIY+GYRiPuYH

g1H5I2PMArj3BjyaSKpyf8kG1byOBvjlhnwINJuqPA62EUzVYoHDlSC0z9SXhvbulWmWhafxypyi2TGn

kj66xlwLCxpqUobKSYM8OkwgeX6AoNMcVZFb8GMllE
y5nWSJfamw2R3hoJtAJ/9pWU9PqvjmtO3CBafJFR4/xOy7KjFvV4BbDWNtBrvta8lyvjVwfXNjCp4KB2

Gflpfks/015tYsOndilmtPbZifxJBvc1RvdoqXJaLy94h5VoPPxKrVQmqjGfH3dbBgcVnXm+iNRb0FMd

3//kmHL5Q3EtUImsBrakeNQPtzft+cpD/nUBzGUpkU
xJGmapb1HQgNKWs6OsC4OCJDWS13eKCqmCvKGgTP7SQy7cVd5sLqK0hbkamkbGasw9WjJowTMxZA0kKs

yInaTAS/DzOwxECLRLtUns5miFiNuQ1SnjI39F9wo9T6ssPkcVP/HPC/JkeynU5PKl0023Ssas9tsU0L

ec7l+A0y+DDNGcLa3LyRrN2E2XyBPj1WhZjZfrDRx0
ZTp0USRZcizbgBCZgbNmzpriV4ZcuRFVpVSVwYG1VDpeU5YW0mXXEJzqbA3Gi0WoD6gr7VZMKhIdGd8n

AmY0ZkaKLlYZYmPK7gn1jpGx5Uprp4rkwrXNtweaI7x8YtUxI/zVSEc+BOp3jbmACa7MTPs9KTHP49cw

GKPMRq1zrEj2BcsTN66gv2qMWX+ih2umXoLEXK/HTu
1V2xPQcRP3u539wENt6Pv6JjKR+z9nQtLkB0L0+y8O+PYW39DVp5+NN43UkzXiLDzVRFqykCHn2nDAf0

6W2ohH71v3L4tNb5eZPJ3qIrV/gk/O3EzvyR51nFl8ZcBP18golHW81z4lz/s8aDaQi+9TTLT3GIAYt7

trvt2vMaC3zjztT6ZgMoZNiAxUtfR0G5w9+TlpzAVs
QeL7Ij0Uc4JhLmz+2OIjgzn/wEfwHe3tv8Po/9a8s/z9QGgJPeOrjblNIWK71uiFQShtIlidQmquFO0S

V1LmWeLS3uWmZOBujAswZjMJHPnv2QilpcwErJJIuwqxlUeesbOkxBxzB+GiQ4s5ybzvHHB+1Vn7W5kk

2zjkpU/Uynqyy5lYEkmemrQhe61Wm88r+7N8yDp5IV
1OwEBha8X1y+N4qTjWFAVhDxZSxeHfH4g3AFv6xtlekeRNdn3fSX1V70+IOhoekBGQ6nnHQA2G4zasra

oV0O3Jw2I8FZ2ab04TT3F+hxmP35e80nY4xBkj3QXmbq8BO0ZujWfWpSLZtMqLatz5W4dd+U412RmLxJ

P/B4quj4P8vA7puKnZumfNCSeLOxnBGByY7fBSK8Pf
1j4qgx4sevewBnX9+zOoEusNRH+0AaNfXHb5qF2+GSUu/6dPT4leJKaE+x4ZeGeDz88fbIHb0bM7pvcq

raEK8Rge3iymUdHPFBSmjjUX4FLxpIWQh0dxiu4xycjuKkMiB1M++7q5GVd/UFv/8T8tY5g1tMfKKo58

MSLfK2iBnV+Ikj7upk3jgqIdcSc0l+iUPaIxvWLY93
b9Vf2ZtINMh6ght0pVP8bBq+xvNz3z37i0+Ahht014lgxb2hI6tDZ1SKXk8lCrimeyF/6ps5YB6z56Fq

4xy6QA26/sn9EUp683DOWp+tSXDINBKS4dpSbGbuer2Qj3hfD5l3M4K1s69KUyJKPWmNII5Z6BwFhxmK

oT9Ur30t9u/4H+BoU2zCP8+QeoWQhjYMUbIlG5T7oJ
eRxsGBRnLOV0VXHWDYGATghzRrtnsxSmUh2jhU6nbDw1vc8RUMZtcnsWP08/F8lsX8ozkLVjLwPKnxrc

Zys2T88V/Z3MAzi2BDW/+ek4pDPXT50+dKcHRxshyY1KYOiFJu5B2RBebNr0BHh9w9BMCiiO0R7CEpje

BdKnJS169r4PTiFxgccWM+cJyRSi2kJjVXCRiBkJUF
4sV1SC5tqWE+JrAUxpyI2OU6hGCjxLjQuAZypuVfzWbfFuVo2T7jcskorDLaFyteHPQkLQEBSB2znaqq

jiAkYorXMlIFvYNxWuMc5ymxXmT1DK+VyWAjS4gW4q0lki1dSzSit4j2ISYsD/O+8OgNGbpIw1paM9Y8

B72KDEZhrDJRfDZu2ad5m69O41wvJB/sduNpcD2RXt
B4o8UtsuiRg/2IpJ9++/EBCsVnMmbH1uDx2u2y1hQZyDhSczLz6BgtjHfx1Vn8A2KoEdVGYvuozachpA

Dr5icJgN6iwniWqrAGXYaXMFucdXl35gm4MD1a4sj4+JI9iz5iKE85ib3lQ1ThUFxH6/Wcv4llzDa90m

QOV3A3ZBw7RxvBPXTn7t+qaiuOXAaHm8OJlp6hhVGE
DhlnZI6Wt2MHch/kAjyluzw05Wb/ythoA9I2xksYx7O2Q7oUL9OOoiy+5Yu4dxbT9utyYUrlkesx6okT

xZ4B343xgCJR7kLVYIO97ca0bhKpL39xTDkTDVLoEI4qzyif7LqJZM5rjJEhVy03A9xPqyLu0sbMwTT6

tEPYG2FKbnK//CPqUMLcEwdpqnzDka2yG6fEJppTr2
1lx/rFkdb4JKTd1uBTR6kAj/zhzN9+W5azkqS9xTQZ04fpBsFT2wHODebQ2OXX1iPvbLBtx/bHc9KMD/

p5FDVDqLg6HHv/qeWQ5E5QCfd5lzkIHmCXGHXGlixNXSHSHHgm7nG9tViNCjkJ5V4192h6+66c6yhW9n

W7c7Z5+NfLWL63DUJpPYr+ytDoynGwCiB/fYed9AaC
4DJT1oij8Zs3zPcAc5bHLG8WZ7jjYyfTxH1HqR0S0SD0ZifxUt8Wa9ARu+OzIiX9sF+BXKhLCJaTTwoQ

tb3KNBkHTOZuqkDwVMk2+tU1tHKsmMu5ZCE9FkV82vQLuFCEYT5SDzwexKym0NHnZTxoYyTwY26Qv0eL

WHa7LJ+yUo3Ug8fuzNE/ItDO4PFIgaPebXvPnkeiBH
1W0rclImQMbfrin8BoxuQcwhDqlw492pXpCaC9/WlXSz6FJ92g47F6UJhnPXnH7EKpiTNo1ydj/DVOjR

DWdVQHO9HQHHnBsbn9IVuqeDOPrkhEKeryjbPqkVGzG/dXzwWKUOFL5kjCwwED/3douu620nsVQmIzL9

w50hl8QAXstPk5YDvHrA7HoH+vaXKwE/tAmFEtn5hk
jaoxIf/k1vm590R+wally/ht0ipWT76WJPZWD5IyMrfjtLPvMZr37HPN4C5Txw99a1A/a8f1gTV1Z9fZDe

m1sqkjDBdpOU/mECftFkEC3ges4SHpW1qJs/q4K0L/nieXpHlTkk7AjbtRFQl8WaTEoVCAu33od1bcBR

4p+UsiJNZm8pQp061IuG+l3og+ZG0X1k7+ofPFPDb7
G8DsNXvAlx5C6vCkMGHuk/rXdbb7uQ/Sw6jj/+h4NU60gNPvBZcvUmuAdNtZ0X78diFu1qRFFdjZYWwo

euWMBO48qG8eORcrYygHJKsTw8nH1WeR2Twy1Vgt/8M5+KQwaSsLXLkyLVbYeAxY82oNwFNnE9xtubUL

CeJDo0sD7uEVJq8bnj/11mEcxJfNeAn9DwzgVjXqKG
+ai/H4wPvYv5UR4MRCu92nkPhnfwUjgCTENWNDPH0RkIL+WFhJv4jbHxZcF0UuwXb2R5uyJ032O5awaA

CBKXFd+x63rmfsDVMs8gW08TDwLrBJeCbaQk5hzThOPrlJs7ncakF3hFE/nPBAh6vn+YPrrcJusBYN/a

kk/8ZayOINE4FY4vyJZMVSJHV1W0Ojajx7AJeh834z
tg2c68aWVyBxR4egMKIDzI6QhAi0J3FB1NMC9lo9UoI0+crRPATQuRt7g2U1WQ+vPvyq08mJuBf4tpG/

pegkJbZweLrv9g0WEurVfoqFDZvi2Mr+l5ek06di+jt7C2evuiXf1qEUlzywMnN1a9A1k3E7UB2Ud1T3

FrjbrwcOFxb8f9zh53phO8FQJVvvM9URWLgT8PTtyh
wb6cKQtMYa+hXeTAPY06/6xyxB4Fc/PskZ8oF8xBP977s53fnr4wLdvdqqiRqOJY3aTo7uxXWCZTeOT8

q8evygXwKF7MWwVsCfaiNqATQ4qpQCEbhviU2Oueslu2MKD4knHhoWBvNSkl2ZgJYybPaEtTA0tubY8K

8BFXS+zOonfUZdRNLghfxDKOWBCMMqtQgpkv6HgfdI
oxX52LUa/Au4TKLdmxLUJRuk0lWSTHvrz0s7e6aBuVTUEWbM+Q1o5dT3Dm4flHcOk/H4uhNLv0NMP//N

cfZYpImkkiZ/Mc2Dn1OMV7L6qriYOsbD2Ct1tkzyUsY8O0v7J9sb9wb2CoqpVr0Uy4+r0iOvDWiNe6LG

IS6SooLAlknYoZCqymTctVl7qWPP0yE2DQwX+/eZX3
Ey+bAHbH1n2gOi4zGsdsz+f2N6lkylu+i4mU0Q72qQtRXdThZa/xtLqTPYs94yBUtNNxDqfE2cBUWJVx

bbXhX2A5BTQwTK267rG8PgE8XLGjDyd4/EztCROUuwYSJiaiXHBNAAe30GoKznc1KKgTRkTQp+WZvtbX

j8jEZ5xicqW1FIH+enMMAldtmMh7ppoh18DiCZpP3U
jifs+GfjrM7c9y1TChDgtOywbPOKnSKygutfoqRw0uHG6Y2nOxa2nuQmtX93MfT4pvlvGBJTiJtpP0nX

9tbEdBgJGS9iADdV3YBLfhZ8YqybWko+yPpmFI6rRiVOfSp7tCisG/iR+2ilg3cdjCHfVpgw2gFRzjTP

gFeoZYDyKag8jfOIAlFp9anlUnPYjqnZ3OrMIF4ZQi
FnWo9BPzIzWY5LyU8yHU/oh6g4p3MgUwe/IcWcdt79CBeu5XO1BeIiwRbLKnkoDO9DrthB/Vqkc3TNt7

LNr9p+Pe5N972dBBL0SOvbgdosmlLHsExE+Wn6+XeO7sULeToJ+o3f2BAydJMAYrf249UNuCZxb560yn

/3HcVCd69C3YfODkA65q2JuERwekFMJ5MNLsdJCqC3
0YFR89XMbeWQmkIgVkR9lTyn2wVTVGgML1ZEpPuq5jwbQTST/1VWPTm1UmIMvWXtD34l+KWFn267VOb9

lAFeG1HHjEyXS+UU8iRCJZtQhtrtLOSOwpWC7+9KAWVFvg+MtqkavZRG57yZfE1yWXMDcUakp9tF8NVq

VwX4m3zF8ehmxjoRWV4ygnQmuMzowvaqo4xEcQf8sR
bZB9/YNAELvfa7Pk0q0Ut5VlTw/SJudH43N0O2+Sqa+eZaN1LccZY559jk9MXvUPtCbcfLEtpFs2dOl6

1toOPiZZtKZ/GZBamS6281eVHr7YRnzpKHflls0aPPKgrNxbAHEINheXnKpTbyoxPTxpJ3ngSyCigGE5

5cjctz2dy3P9PICk7uH4TKU0omVQmBL0bGcxM1oCTW
0LarHmiRKFECcpy5yqybMwh0c50W01m0T6u/FvM60w5iL4cz9VokRT9I3Pcc1E4xqMvJEjGukuPPKd7X

yxMR6+twBaN+QMk9bXc19hgZf/eyPmNQ+51xaMJCKR29Jb/Q0RKN+7deQDmuhtdI1763cVsZRKJQXTX3

LEqEfikhmiqKfk1cqPaMewfWwLB1+1Kd69wyuTOZkC
gGrRyYl1HIWJkBWGM3rkJPMmIIDnF/xErJELz0D+h/5LxvRatSkZmmRQvaoo6pGEWv3JiBL+KAatD3c5

jUHmwnvUWeKgRSs6PPkZG+EFIh2nsXuULMhi0VEptDxwOqG/b7HWW3c8xyO1PugI+uT1FlVk6Y/vIVS3

eisAYYwr4VFMp1xWBnhjgqhsHmrlhW/Ten5uQIM+af
VizCFojxeXWoGBxx8uT+AQYngnSuhKTxoecDIGxhwW15eyLRaSRFN4N77oOT3zTlKP8yU3D6esE6LntZ

iFdRgzJ9IJ460yAFWtvoA30p0dUNMk2sD3kw8rBH3dIfpKyGn+mt5bV4vX/lcLAWZJ68uPrYbmf6bLAk

riSbfRMzXpnYJnJP2HU+H3jUmLbWodhrs47M+ojSj5
0ljERlLCa+ehSP7rO58hzuD2DtVDE65uKOeJMPmLpJ/RiikMukjhPFniwCdH83frm1n5wGbVaRVucNYj

4K/Zgb4mu94T1AWxQjdDoj9Um7Ob7O83ueN4IeqazJhzPieRIV+gzMPcorygwspC8/fUYn+eeHrujT0G

7INIbGFoUlkf4ZSRUZ9IYadTw4hxDz1BxFetNDZCyT
j31yYJ4CygfSIEig58Kjq9tcQIdXUaHBLp3VYe/oYcaaAXVAIFFOUQ9aZ4yaGFvzd6UqsR+sLvWbR/mA

9ojANqr2ER1bGLI2EZU613hTq7f9chlydOYIfhkAxL+yGUFbCAlzFLOtf/rh7Xg+HHKYn4mG//1i5Jip

UThT5u63UYbhA5oa/WW+sVwIu2IL+ETKuYvXcUAaBT
Fm5l8hOqMZyWpv++RXkvy3b4tbqh6XEDpVDJ1tuZOAyGs6+kzGxHG4kTXeq+LA2VPzZwq7lcJ96TFmV+

v6lJPJyNFTqlgZ90eRRPmgYZsclKquZjOijqolmKdMt+V0t3RBc883YHTS0Xs63PeNNWtHlEzozlVIps

G+KpzdXzy+fkp6OhJvI7h1NRdvbjjQWqi0FfcO8TGq
kvDdpogqgHvZY56EGeSn2cC4ToMKmy0SId+t6L9LleOX2ugLnpYpPp/okiiFJkobYQOt8Rk7lKiM/R6y

H39n0TczawEz1IJBruW1hqPon/16GxTUj8j1RNowUIA/62NflsU3CxBvKHlnqtyl9BVV6qvipM/b56ew

cYJsLaSpPsa8CUn9LeA0csMUWx4Euaupo2cRkGlsCw
97IohAdX6nkrZGmyeyQ/37fuYHJbl+jSf1HOZ+iQzfyz6u39ElBabvWWtb9v3m92dkDvp5IMgGriNHEp

8nISV0552RXtWeh+Jsr6orP52tPvxClUB7DYJ2xqSBX4WPCBYGxGHMb8UjYCcSL1i27pHDYkC1N5jqgj

qIOAvJfsdDT8nJ0pT9CBSB+XyvBM9e5nlwnWuact3H
RU/9NkPBCJ+tkbW/wzfoE3VuqULIUGfSMIF+qbZ3//zMoW+tBpBGiSE/aTGPL83MWejDyyLJJXVZWLQj

BTXY1il3usoArPNxIK0clpalbpq+pykfyV4ERJAoROEADp+k/nfRkGk9QOaop2fLBY5aW1eXyO4FQy5k

ow2098ySW7qbRx/gIAClZ1RldVBoNnYtHka3o/Kps8
wC3UZYBpQXDTfe5r9XPbRs/z/qK9UmbMsvPKCj2xIVfG2g1csKYCFUyvLEoGszkLL74NCr+pOfhTGduJ

STFJZvbi07a52mH6x7zhBQF2WoCPmenSSux+exHJPsbJjXRmIkIW1dbyeQ4WrUvq4Rr4/GjrRv28+QNz

f4+jW5Kc8+WKdr/KfespGHKIrmLXUVKfyzW3cGbsXP
3CeJn5WxNsE8gRBWcwfA/C/y29DGaheSOG8nm+YL7c6A9H80BUVJakrGdyGPF5pXjh/dRll8uRM7SRdV

oNf+6x8N3Qvo/+ySS/kI20pyLjYHPlzAIkC5OTs4/U7lPd0F3p5uk2Ff2RELSmKtkbVNi3gRKkbIB86C

R7um/CthAfgVpmJNcMIGd3/2yCaII9osLL29PONN60
R8WyXAaUbgRzASURI+I6O4N8nk/uQMsQ+MVDAb6J91FGkosm+Ex8MI3leQklKAAIYEJt82WMdd2VSc+P

K40ukSWxCP9C0iA69PgAfHjjJCHXZMWn1FvbPqqpojWo+A63JgUFP2qpYi3uHT64aylaQ6sBUaltPt/D

nQR13zUx66lmy+L3Z1gfRm3aH2A3dq3E8VhRlrqpx/
GDcTaMhDig2GHpRvFc4vfunuI4UjsldPd45O1n5uL3wc6+VthS/e8Kz2UTemeJ717g6keqZ72eukInr1

LkcCG8bwen1wwOEv4dA9b8FrIJFTvlFjxUxtPwpTi3Q/b6bflm/Yb+FlUqOc0MH1Ck0P6QI3ChY+aGq+

8SdzH62k5M8Eyl8IVnry/WtdwQm7H2fEgExvgjDNFy
0xYX3Tfc0Q5G/Uy7ym8frheKhpglDxXIhfpIjFJPrNg6LdJsZITuyvwnmd5Ta3jyoQpjuoVLIb1dGCDS

d7BbgnWyAxBtfrprutffAVgKs0AOtoYLco04zyBx9gwjXJqjTX3/2er53pP7/xxg+3/wDeu4wLpiBpOt

O+Kg1z3dX9LXIngK/U5c6eyRpuR9tOPNI7iedOl9Pw
pM2Wq4ej5TT3h2zqe2GTWto//76mTn8MRNp992TNl7zM3nemsB+ADa2AB6OPCqRjItRiit9j0wJsHXYN

oyp1j7lX3BQDlzK2ac+qlCTSl2tOyaGfEuhbZIv/aA9ZJns218/uBKtdRcnUX7/et+oLVDxhNi7l/Kwd

mCEa/EHbUXgvdcP2JxUgT1bujd8sl0mbc253Z5jocx
oTmlz/1EuwF9WvPsT6gIxmhCdI3o6Swpw52AEyZa1ApoDqOGLwog59XX/ety39S4/5QtPxKVifDrCNaz

7xi8g7Gy55ct+7pG26g57V1WTHF3F2n3XKDbgKYlGVWTqSJSnGHXyieoqmf+6C6nxrQ+IngWp3T3gvYl

l1bFia4dBqNS+l5KWr3S1w2ZCL+Nm/VT2cv9MArTpn
04r11b/dhvVFSnCxqY1qE5qVASdNWKQFU/nmMWiigQlXpgm1hMMvuLM+0uLrMzKYNei0jg1KFis4mP1v

xojsuJZyTFzmdKiZQ7kBVG+Y3GZNkrHM6b+pVlC6BNTq9Ry1ZsUybTZE2y3j83I7ocqG16+457SUYbzd

Y4a34ee7l+FTIcuSTNLsgXrpqvzWbB0MC2XkpVwmn1
WkXjgpdR7lqdXW1Nct9g5HPPvda5T1UNitQV2EwGhMqBfeqYf42uLK8tvulzdyeO2iyLj4cVtzFjd67k

JtHhKI1wKU7/d8W1sz+qZrSuRo3PBLl1B05FunSy+eA3PGUJJct1C9X0OWGK0gx7RDqawgy3ncst012z

B6gwtKLmoiNR8DlzoEa3IZVm4nPJ6jCzpA1R9iVMS5
v/08R4w3SuPH4Gjy+hf7GjYhulF77tAn46iV7rGueoYEmFCWpQo1CB4Br2wKkADF0PTrjlx+UhDl18wN

P0E/0RT4PqfxD9PIm0K2h865AxhcKWgMiwkEPrtaoUJ0JFvwAg+m08JAessXR8VLPEFD0UMpcEi29Fzg

PmCBnixq/XWyZodt7LVXSUnlloROqxdpIVD6QlNyvd
nVQWO2CwqRfAsw9QLhBisT9020Wdbwo2Tf3YLlkyEtizudh9d7Tjk17BxgjTGwFKIMvRnRfWMvGbjtvI

MEhkmdAZprgj94XqFF9d75kiItVi4T94eoPN6grdO7rvjaRz9cpYefw/5MhW6loBYNrWFXdencT4t31w

X4gcnFNvR8ay8+DYqM6Oi/aasE5g4aNEP9sBVLHWMs
D05vEa1RINYeUqTz0c4HPC1/Bz7/E6Vdnaz2IH2q20xuzIXsDgJSyksnbeF5j1QC32motmVmcp7BmFdG

53AjJYH/wEBqOw+Ef0mpePaytVbSNj81wVmTTF+opN+dHAg1ppD8b03hWCvsmfk7uD0d9bP+CZ/KlqI7

izQGGBNYp31koaesXoWV+GUSnPVqXja0Qvuqub2poP
z5MxmCtp8gd+k/CqlD6mS6VqGdkbDBNa8nYKLVsur5qfZOvEwOIISc/i9c5hPvfaVsZg0oi3bpYmh1VF

RyQUC8j21a4taOVmuWNt0In7dHtxrQi2sOVEiTfGAaOi8icm79dZ0wAYpaUkL4n/pwc5Q1dGPB5pN4NL

aqrR6OXV0K5uMP+kAWFBsP5OTanwbUi/VkSA0GrMcS
9C6t5CdRXU2j+XnK9KVmri0Py654+VhTUvYe80u7WZjGQkiWKfNfTR1ugehUJSEkYOasDwgzc00avZQn

FOE3GmzpMQO4mnKLhGuQ3uo8FVuJV+v2nWd916PCUtH8LejW6KIbQ1eSCHMLiCVMeay5oIPohi9lRhvR

acD22o2rRN83s9UXqXqmkna5lk8ZB2W8479RY4gm/4
pHFuwXcUkaZ4phvOirXTVTnVorNMtfkQSXTSZt0nM0Glw2PnthjZwAiIAkctfFoyK2zNsh0YkzLV0hr1

pcKqgWI0t/yPwA3iRBaXd5nrYJ3IE4Lw5ek0noETqbkIAPiYsJkkRTaH71+NxSazGKy9MoJstw/KrKSw

KFV73qzW+FztCtxdg3WuN8Cp1lm/56EqffJ6Bj5dRb
NN1gJe2D3v810zj4pxhC8t7SmIR1Dw9hOpQ/sz/RqOy1Y0URw4ImJJ2/SicGXnhiJOXGvG+u6HnQRKV4

qjcr+a4zpWHQ/YevCdb7spSViaQzCO+3T0IkMXHNOnDCp/2bRS/nCm5xXr6/jfeMzm/UqJqqdsVddrua

iHKItuQtlRjWoO2lNMyZsFw/fWt8kLRN3FfCGcIzaO
csgivFdilcb6vr6n7Mlrg4/6xPKoSyCuupW3bnQ8H9TjlVeS1HAdIaR1TJytXHztW1AIi9AjX6aDHxIm

Gt06N0FBvrtBMw9IqXUGtCgHB9n5cSDFFHlUMTJ0tw1PEF3Wm08Ld9+K+oo+Y+YtzIogQBFGBX042QlS

yIbvHnEv0Ektof6yttz4QdRWtQUcDfy+xYIN9wB8Wc
A8dlQPAOnGQqc+ZfKtbwQf3kslIeEL3i0CKiMl66zBpkbPFC7GWOuswurgObsxFchCzeve9H3dl2ypPl

32HzvgwFIvLhvjNmrINemuUwtR5BafwYbGrCFyqOlU1YOPYk5KwVK304TgpMG2MqfBN3OL1LW/1C2A0J

JOw2W4kFuH61Ibq9n3IXwyxuyy5paqpwvF02fcBA65
B0YTHvfkrfkjWx5c7qeYX8n1kWDnelfvgA23hFciHUTS799M0LJN8qTn9+rSK2UtZgop+NdN+J+Jbowp

fC5Lnq9gPRxIGOq/Qtkbx7in2lzFngK4q9lMqIBvJvHItvC/T5/ahrqJzCifC0CkQ8GUDrZ9yTZRi0xn

ygw4hZEJMD70TYTy4Gv8nS8rhLN0+1yyGqfyUA8HEs
vszbtWe0B0T3GuyDZpHVe+L6h3TFUfzxMaFFxZELVn4ZEKufMXC2F1yikybv3FHlYbNbEHWbBFzUI+wk

Tc2Agrh3UWzFVOW6Ka2CoNlAnscAAWirypU4+yNBGNkZH0Za/HPhei5eX/PIx/mU/pFscEMIoxYJ4Fhw

+G0++luZllle+8xZkhJieJGzaVJ4Cpvjag3xqAT7/m
zJIkU1oRg/a5xg/SJNO7b/TN3dvgh13zQh6/t3ayp8qjV88btfZY1VgXuBqrfRVR7/tRP6Ll91SS0bz1

+w1aMGGHXTR4hD9ALQFbhvsRXCiOmPfsQcIvmkO2HqN3/WISAM/ZQnFNXzF9RjvlekP8pQo4ZH9hUDn4XB

K35U9s4l6sWuNobik3taHdcn0+PXLNdwVT04lWTk8x
lpO5vKUW8y0ujvoi7IZ+kzAAuRiPSm3pMeo01O+WRgfVZevaY08y2D0kKXAurwzK6DX5zc8EAH48NQpz

SxqE/glDY/HkZgYmRWZldgMRlO7gMh2CScDtVbJAhx/r8+hvvn7Cws8yCETt2q91jewnsGiSqndduc1N

qWyAjAj5zNXnwov78ZISkbkt/ffFcE43gZ1GSyi4N9
Hc6nz0wdqL405jvHlDnniC1yrsNUdTihJcngBBf/uLl1+q/q71UAqXWS8pcNJGIYNqueu1j9Vl3wE4YL

sIFjw4BLgqCi+EF4xYkHDBqop6vwuFYp7jNba3hZpfU8CMBwmoOOKeyxnK8+cdsFPaux28M2nPgRL2Rb

Lyy44j9dFFO6V/Gw2deeESTaLLKkHwt35GKpdfMwE9
EoH3kiXkCAho5yitze6Y9c4TE8Z7dGXrlXP0SkrU3FHlfMFLw1p5KKYPoCUA3bMtBiF083dEBo7ZtNXS

qFGZN8egvo52BeHEQte07VVjQvS1HzuzZraq8OYk4Y920RzF+QSqskLpSVlV1yxJHKilROqAlaM9uSzt

ypZzVLisQWPrG6VKsqDFNvQOgMGDeE5VhxeflX+wXn
JYwVBsoANvZO8Mp/u55k5zX1LLNFXpWJjDC4Vk2+tSRMB1sG8vKNW+fNBwzlXXdggs1ERqthEDqwrwN0

PCNn6jNIDPmS7F/EZqXYZPVy/8LP2gK+kielwYbhCXAoMDrulvly3OGHtt6W55/qNwvjVV2UXuJP+yO/

sun5qXe7P3QNO3DdKg76jGdzOa/pXVf3FTdYl13b4j
4j7vXdOen0CMNUX4PSKq8OgZBeUwKccdBS95dnO3j8BL6ubS5cigGSVqFZvpMrn/BiSjRtxFmCUAzOrt

pNITbmcB+tE4yjdOSk+wgiy9mPUegfRLj3ZPn3p78G4bULxuisdsYbGWqfIR5f7it4JGghsr7a41IcS8

yGHrwUSULMjWqY6RMQYQrDLhcb6t03RyD2rjRlXQxc
04wMEsZfeG3Bh6Yv7hY8y9v4Kp2TG8cScMOgm2uD4NFq1O03Uod6flpryyeieMVeJUPd+IcKYl5HXUbu

cluTQ9udalyU9i3vij8hoAEj3dy+DDNj8KRLE5/N8VAarelNTjUyOtqTTZDNL1WTqSOfJ4MqNndf81yD

M0AX8PwVzEeAa3sqzWXVV6tq7Os1Mv+KryZnz7ZnNI
NNTTvnLD6BaN++CwvspsP/ub5kkh2+3W81nCaSeX4KnmunbIV0Xuy7aAxCwdZ6ny2n4gQE3oUFAzjfnH

dqxKKQir9bUANWqcMtNpjOFek9joYNVJZ/glW6SY2RRrIkSBOXqWlIQJK6oztQSs8fI1WUsDcek277OD

3vRkmomQl54ybjqjImwb8pV2QfKkN9im1wy3tfv0E/
RpVGmN/74ZiPXgpInAh2VAfIovhs5na5YUxguTG2YT9FNVrVUH3LLnKf2+RffQMMnk4UeGwJBX4t6ApR

l0e5B31B5YuiNbDVV96dWvOtidxrq2C9LYnY86Z6qCkoxl+0ZvE9d+ZXfqTH9unWktCSvzdtOqzuQH2c

M5e4mm0hls7hUmyGiqyevfly+rnLtdfMGL6ZX7p18h
cVeGlRlSpZfszpRNc2dTpEKUnNKTz5N5wxJeSB26gf1+7QtsICP93T/PJkJa+ikKaF0q9l5eG/2G07HY

o79CpdjXaW7kayosr9Jq+xMLQbJYfOfi55FWnPF14aJCX7MLzWXrr/pcdlCrHV7gjjUXZapwcTpB26X8

gcCAZO+WDwj8BYcOo/p2Ov0Bn/Ller6tz187aQ7hHT
/bx/cnE9CIcrgVF/CwhtJ/47vCsyxSI7nDQ83uCXNhSCwA6RkT/c/bbfrLqGqykttRp5YjVPc0OrxGG2

PjQBnLn4tGvMEqrM/nT8bYj5+rK2/iVkMCjqSRxvZMIv42nSg7UhxQ8XTiWgu7ZM0bVAbXmCgXqKFXF4

dVIurikm7nXs2PYUWdCe1kE9bQflE0I67JGv99TMAs
5IjyDNn44OssppT18OZa+cQexR6UXTXjPSH+ToQsQKhKpS4lS894rS45ALTBSg60Te68a5xXy2atYCJ7

S7fJztAFFnzMv2sGdqih1acvFFekvojQxz7U4X1CDxkBtB00v3ya9uOdcjjpZMNC8p5bkw5xEJouo+U9

AgHbOyvsTknGxg42nm9CQK1PDpzro7Aw9sX+jncOvD
qa02xSbfge1uRhLY1rch1hwRUi3QYuteOHKOEGOOZqIZR+GimymhyF47wPrVbiECVQpUQ8PbFxdnq6Of

TiLSr/MeHUnUU5c/u+FHPoxxYSOdp1WEENx7FoRTE0ns+aR1gzXwxLL31tPxdPhDAprmcjvgK4LXkTKR

D9Uqv357ecMGX+yYqQop3WLgaYz6JT815sk6+OGO6q
kSMs/7krV2KPpWqDDTXbPI97RkG/fyZwUVkPPUyrPC3vvG3dlWt8v46trEAVweRWTzmjuXCEAdWVLL8L

/5g8a7czvfFUKraKl2NIL3Dwna/aZDwCyTmmgVid4NweCJWR8sLMGfMcRAjE0Xq5GZA2bHCZgT/f4rgn

NjzIQiHUhM6wixk9TQyygnOBP2ilQrZsOeCYBscC95
v00/UxNo41LZVNBMKH9PdTpgzqriA/YekRmiUWDchCtZ++6byA/8giUhWHiz6rquZvSndgeZII6ukSeh

PHDVhnPAkjrhAdCe6UIycsyw+WzX54GDfwkj68QE3PIqQu339jmUKvnrleAlINK46hbUL0ZrvfxcF1gt

y9DsfI/hppmm6dQPkmV91TdozfDkY8R3Ybhl6FA5oi
VpVOCZTJyYQfJQWdcefR32DXZX7yqOUD9TdvHrJe+vQfwOEwQJz6omRbEkWZWGQdIKkDO442QVUEtKBE

gMVaQfyg+G5RyXF0QnCYf0vzVmjtpKXe6w+JyCoqANMHqGnAoY2/0hJQRwgLAmXNdur5WjQW+GbBYeGo

Nnmlnb7Q9HXUlTtPOk7ZGsMc08Z3IvVjwldhUVNuPI
B+qKKmZ0xVQMbnPyTVSBRX01CTraKe22b6TufaZ7R22ELFTGW5KPWDf9Zm7SdiSrvNblwDYtcL8SVkf2

wj+T9UPmfg0dYmt8NECUmrjyL9Acrrq8kumohcgqHR+iTgsqd36UPqxaiBHHhPirQi3eKpCdQP+Y7xZQ

v5vANAwnlFfuoKzUYHkAFrsEw0wHEhAvK1SNVKPTPi
F4r/CAW5KJEKkloe10iXc8WgnicValbQ7GbC8AOMnQd6N4K/s5FXdSc/auoTD1goQ7/+E/XttHtwAwH0

qwlNU9TxEH8kSfDcB4+KtHFtwUV2v/9x+hFxvl+YkG9W9A21mSFrH2zBpu1ffTHmRoPBOQGrhz4nApY5

w983o3AXTGHdVu6EtnNvWFozVzcOcH1dd40jyg92fQ
6PbhWv02Qw/7tpwcWrutns6Y1PilVVDoelfTz8zWaC7KVdDh4GbTzsNXMuUtSWLY8EIiAFYslepOKmm9

2KQki4gFS5AS3JBCouYadMQcMge+2FaLYD+leFq7/wpybKf0fZdSGE5oyewjae8r1xLa/A5CZ0xyst93

3/dJzYa8Nj14RpyG661OoanpaVZ+6efzu+yLpIw8Kz
wWcKTHSbaZKJkdSNR1nfmwL37mBDHPQHjmBTaEqlYNL5c7yU/pIfPyvAPv8Dy+0bnH+DE4Zqc0DIFjYp

6lBAu/YMZd3vL75H6FyO93VVi48HFWf1SASrbdMEhxeMLjRsD4xUJFA00uXig1Z2d7A7JCK3sHeEm3Sh

YyjALcIYJxxNg0EYHlokwsw0Fh2dvi0k2ckHDCbTaK
Zh+equhL3Qellk8YrnLIuA6Ep3BXKutpNsrzje+1qzQoHofiGZr9EG57nPTCKWdFrIRsma4/dwNICMo6

E76BQ6s+1O5lhiozb2/Fxv5DsiTYSvPmail+2FlOa7E/yXCN6K3Fy4vW1VrXdO63AkL0CHZEqY6dRVqC

v+bWYH+vZhi248RB/gibINGP0vMDa2FaDg+o15jXhC
Evtjd2PLH3SwXtPLoSWVIffBWNA4Y5+ANmFVoFf7U2Gn1GOY/j+1ZiaIE/kUn2+n11UOzEm5vUcqef3h

W/rQ9o5qBqvcE+twaJedv+id1+IQ4VovEwncBzuUIc3XlR8oGJQXrpGN2k34AyRe2/75vJ7Cgbkb5AlA

U7w24tZsSIaSkbM+Y/5CmLopNyJ9VodMrd8KetZBxo
8BqE6kfyP7saWYRPWiu40lFwbk2fO81U3kOZ+iOebHTlPtkTEzKbK9Z6HIov9b0v84lgJjESZA38a9Nv

vJXsZ/caIDRu2wvUNQeQSHzA+fGinG2tcNppAmjvltUmxF4McRu6EsougiKdvAFlvUqCDaXaURdEaZVz

sQl+GOw1bZAAUDwKzeYUumk/VtBlySrPub887e8fdE
exptwAiaEykREWn/jde4ycY1iWGXLnk75w0p6izfU6SY9TO5zwNt1vqdKpvDyIbOGgEcnotU1cfLzFZg

9x6BwzUMKtWvaMxlwAAdahWDAOis91sPotmT2+8X5FV+jsMu9j6/Zu7k8Iy1YMhMsH1elP2Z3k5+iSks

Wz6R8gu+rQ1hUGFFt9F+0ueUJZzOfeNXdCINPlwArJ
epia791l8tyJCoS/vVVaAJbUx6BTHLc9XI9IuRL4pSdYwz0sqpSd6WJpmauzqtaINHCBYWdxHBXh9r1b

ETA1jXsWi9F75KQuBxXvgEUb9RyCw02JyKG1ko22KpqX8BsYl25KNmzz2AZe426C1rc687e3jzxXBwpO

pEsBnuQr4Trk0pUsppxK4w38iSfuhby9utW/ca1MY5
kISZiY/SH9eugF++CguqJySeDzswfF4G4IDz7LpjdGkYF1v8v0Dq/qciqEia2v/jeHJ14SSRfjqaLRaI

pW3GRm+c9b0tsDz2i+hJeeFHuwFSW5kbHldd4HGYcCWrclGF28BQn1Ei/8UPpO1Ee7E0/dlk2ZVHmmZ9

eY38OXDgrmJ9Q0f/kZHRUE0GuHuyBn/egpXTG06WII
qkSD7zLkoGRxmUG/4/+Pe7r+x2vPKZ3ipf0aJXau2Fv4Q2cC2uzVdEVgfhX/d8kCcXEVRRAJHRz3qyFI

XS3J6XQ+YiT0Xjj8iEqbtFPhcYxq/0uUvxckGidFwWKNq+dTZYViCttUSUQkLe9ANIQ4oR2I7nJ+FmAr

GCM0sUI/vdfNABDIW6XaYelMRgayCnXSlTOsIJRqyH
hCycw1wcW3OscITQfGQOiu69Ihdj+uu12RnPyJ/gkZnIg6fBFp6r4nkNFKN4+An+Tp7x/pP4K2KTc6JU

uhyw1L/xIq4wlDuPjjXWMSbyd2S0PluV7VAlRVlgsxLlNnLp0bQ84sCkKxUOtREL+B0LRXP48132gXyc

qMWZkWkNb9+n7xXQrmqfTjKrvY/O6BtfY89UcEqkA7
RmJwJnXo4Jln4BxaIlgxFw/ekVbTAoWCDAD1qYDgDdwfTO04u6mzKHPhG5dwR52c4ojdkDa74iZsxBgQ

FqxzRnu7rxCw99ARSdWxmPCN8rkiBNlf4EvsevjUfxH88cT7tn2yS5miRvyDxmOQ93vI4mzQ8fy/1wbY

AVzgdr4vX6w44UnuyyIZd+PlFW6/2Qk/9DicUZnrdW
+QeityditXiDP5CMKqNsEwcib9XpsjvLNnx0pr6BnIyd3J9VlKuu3XRFPDMxFLgT1D5GmMmbvn/JXlmo

O/ffTyxdWo4wDb24r1AasUQUnQbfxM6/z3USwy4wa8y+f/Tv2moGKbKSH99X5GnYzKg2sMxq/Td561Mo

GsnEkWHGRoMeZ7U6UnNqdFUJg/hEUEXxNVY9khbeCN
DYJYcfblzUiCmkfIeOvgJ00SQfvoADG6tgm1gz7XzAjWd5LbtCr2v9cC4w+I8FYgDtOU8u8of0CnM/aV

9Vber6/Hq16bX0PxhG4g65Mwnsy49iXAX2Qz+nrEQwPer9lQdgzs0eTfeKvpx7+cn+0jY0tIXewirM9f

VfmZ1ehw2YB2/r09s0OaxGw+0f6rWaM5FglIlcnpIh
2N/Fuaayq9K3RJzcaf6yhY0q1wiMvffvKxOWS1D9zlhGjKhuEiknNlABM4MEu81kV10Xa/HgWvc2HuUD

/V2OXw78mY6EOBwN+uvidT6Bion+gaoNYRa6J2PotHe90sLh1UXc3OMdEzx60Ui/XkpRwUkZbEs8IBuH

V9Ls+sbqjUO+765fI0lRMFO7LSuvkpCHX0yRej45Aw
v4T4c1vEeWj+HTP8DIcgelkmnrmN1BSK7QjV+uGXUMT6vm2j7chG9gF2HqvYnUTBZ6M1mciv9w0voLvJ

3Y0EQ+s3t+tLYn5/dFpVYlZTACXiY9LHCsim3iVKb7KFRki4q3LsmWaYRlk9F9+53lvDMUixutf7yjIE

BrIw5fWAVQKz7PEuqUPGz35pzj+29YMhljdkXh1ZN4
DGcp8sT2KT3YmlwF8Ut2+SJBPGaSGPL0hW2paXixN1R54/4QHPcxH/wzb81ngdyl23tIonccyz2clmiM

5iQhqtjnW470A4fW/7a/EMsXzRfDO+Q+f7LyeKA3hy9jiyBwFLx4M/IrlcxEIQHKBNN5YkHyLwm6ZfZu

xGPdgw/FkqgL8L5d9h+HeY/3g/+tX0K+eBdQu+Ijuh
Nv47Jd9iW39xpS6jxJzo+FjW4GgPtlAyw8eDDuR9sc8LEULaIeKRRZUFhadFpnkv5DbFySB6M0TAswFt

RZ0TcGtilHBW5Y8RMHKQ6bQmAa1B/p3/DPAhPl7K0cGsnxW7OZ+4AXZzjxqKrpB7Dn26UHiABNb2ufeb

+4Q7q/9ZlknZ/LGa+BXrUDdz7j/FR41fsUasEl4BHH
Kv/x74mosnYNZ2dvx59/yUEnZET/PJnfCdMgIsm0zuEf3dz+aSPnGFazzDrt3i1RtkpOa8/ffzOoWhFu

G3mg+NA2gnHbbGYeDHr6vWUl1bAEisb8DqwcEnvWpTWU9JoMfA7pAp0YScuhl0JXzbkSZ0hFPUb556W4

7mS24FwZeMdg+e0H3MT2dUZy6AXbLNgMJ3ttnGW5uN
04vNRwDLKJ1K3wrJvbxwsCpC4VJmup2ycp6IUZ8aoIDBNN1KxyK3wS3gtRDfVbK7kBmPEFk3AQ7y0QkF

ty6QIC4XJ3abS3abKrbBP4/AJ5rwtC2/wvqv4w6NRK+T8cIiRTmzFzMCMHGD2JtF+g27ovg0h21omjh5

KdtydQXtxsWPZsY4Lqb8/CHzhUwV8Wyhe0ZN2/evbX
zpRaNE27S8E5LrHNNBzW44/tVujV4VPYDCWQ07LRxWTVuNrZQBy9L4FlVCNJuJxU7gyRs+byLVRio5Tq

LcSTq/e2E9G+mlgPnjWpNeywFEN+6XeCOg69K8f90thikDoaCxyvgPxdKT+zbl4U7EMnnTz9UUzzdh8d

oatH50Gu8A8R+sgKOasuT+luBUhDBfDfyW9AQKRYM0
4enBlR9jFxSCyzQwErFVBQcclQRBaSuatT5t9tbnh19QGfePWPtt4pN8w5PkzOyYM3xYmpIbCkr12IV/

2ssbR5TLwlCzBBZUWnhbGpxVJx2EmnLTSGTSCpX8DlJgVrOHdco3J3X4ymyOThg4dH8+dP/zFTvcYE9z

8Vk6ZRJB8/xbH70/04S6uqqFk44SOQF9ddmyLg5KJv
Z1ikYPNr3ls7gvwhdJqpaqxGnxG5RJMG3mXhqrgx0ev5L+odquAortRGT00QjfwjGg7IA+5k1YtWcevl

v3wwgeNvyv0Itk3s8M4Ii70bdivfCiL38tGVp3E/WeL3B960HzztSvXxDw88FB+SP9Dw1WyUWtHT2mdd

IY/AS0ywUIioPWsWJgAUzI5aXlUsWnDvhf+4fgY2CB
zvBxjkn08jFfNNi7nfnYbvIvenYZSJv9dyLlvPP88EadDht8U429QoZlhcfjopAZVozyjcn9tLVxUOHQ

LAFRbEnb4i07HAev1Nozj8AmUeGOk7BIvJ5mxe4z5xVwOTgaTaRok1GMqkehux4pB/xl/8CfK+2vhhaW

bw2oGrLvD/2tI6OHkb8SL+jVrYt+7+ZPXjBDJ1YqUD
Zblev7vqqUjGVoM38ZP8PdF0VuZYaz3wBT0lGNU05QMFBU7S2EP75xDjKnNhpaH3SN0zx/sqornS6R7l

4/4zDaJllNeb+7auLmHVxCk8JS5jmxJUTIsfVVh4yz1SpqLAnnUhiD5EcWLfwgo8VpBogSFUNfh+oOrx

7W6Pq0NblwWsYoA82H5lxalBqMZODF9D3J4FXTrfN7
jqmnF5sTL1L90bJVMVwItLBfoz9U/Tg4UrA6UrM1hdyYVtTDn3S5eBUyCuE1audCidyANz1ZRk04/msL

fwBW+5oWOjKwRk7wIsc96IOIU0o+Uy66fGd7Gb3uhZM1VIQ8S7p+zxViVG3Jc2hlD8jahOb8AyKekwgX

Y/yh9FmSCMfDDVZCAr3fiNPUHQfF1PA34syBwPvzV7
ij93xs3ik9fW2SHlBbH7Cq0dqahhTbhey3jTtyEpb8BMd9UwaQEoLv2bF2yF31qQmQbtxRm/x5Q2zNF5

q6b5vAEzdxag/PAAeKXSg1h9xsJ11ZzFfvFdpsSNVG9FYZT8NkgBQ9DgtntNf122i5nw7N5qpywEtL68

2Vtgt+vs9hDSsHy38mZeGW0lc98Y8nUzX6kDzmnT7N
AjL2FBlLVvQoOUYCdwksTu6j4F/oSDhS2CxwFI8U7jXp6Vf+bgZTlBVGPtEUkQoCCdQc12FEgn46caIK

4wN2uotX16TrK8mBTm0yu/Bf0BEwyLby3PGyWwJFa9sHfRxupjCbVhMLzz5QhV7XVtj0p0CQkY9n1J1G

/C9b2qghHhYT+ukBxQW3X3hBW+9JxN8RYLS+h9QSrm
i0Bxuded78I1hy7izPjYmbaNfGAsYNwkgtXsG5hjenmDX2E3IhYSoLOCGw6ceigKoTVXschdReUWZXUM

Ox6NCqHJlxuiWa03qk7J83KLUw4JFYnd25tGPwsgICiIFM7L36zzNlOpfNV+ysU1x3wGl4RHF1/miRxW

4QpKfwG218W5mGQfsCChgYrZjvV+e9MLm7ojDWW+S4
ziGSHnlIF9K1vCUfVyQsY4ObTXcjHSlY0DJuwcc9f/s2qWRz88I3v8mwpgTMxhbnXLUiCnoGEvQpxCQA

N33+kG83TfK7D9DKThrpTV/v3IpeUoc0tcAWmrkBlVKMTdgfIQ3ucDvcH4A5NqV3Me4ZT+QPwuJY121m

KveNJI43Vh8/Aoeh4W1S34G52o/bAxzOD9vVSD7gKv
/O+cHp4WGKa+KZJ/Rk7Vw/lxPiEf4mWjv125PmBdnEMf+Qzz+jdZBawzXe50eraAawgDLauYFeNybAnq

LGNz4MACfInboEqabxaO1y+pEc0SPhmR6sJUmiHF2sPV4MaI8931idWYMLZFU3X2UZC06EyueOm/JMq/

Teresa+6tQh8wc32MoEaSBCeU6KWdtMcbC+KkbwGUFkgr
Wq4Cb7pc8fO0h/E9uNf+iXyx9A1QV7cWGPX72js54iY5hvrP0TzXiq8TXzkEFxhxLJeI7dIyqGtxEhnS

/sQZ0PV26oVOLVU+z+W7IX6V0IE5Ti/ecDkuoNZm++aGfklCYBUYpU2SmkMFYWw7lN0ZZeFv74DWQ481

+xZzULY+rBAlfZO9M8GW8txMqegZMrd+SSnv0L/bXs
yhzaW7sGSy+LvepJwwMMFszw+R+Iz9YmPmh8mCEpLjdV55D+hpwVCOHhh/mtuPUqV7sM1+DBSTADj+Bl

GJnrH94ij3Ed1tQk80IbOA6neCU+3pbrtnfSwwNizcvO1i3Rhh+a/3yV+E4ViOQDk2ne5MvcYYU6Ao6H

P89x4NP2t9vnE4mWJOBmPwpDH1/oeL7kvw0j1zk41G
U10R95al5mIdZGI6xW0G4/Qrf3dAgBReZ+71kgXWU5rIq/HM+M8xe4fNMTEqoVDpqwX0pYH4VOUAAcn/

Tq53egCmNZ9LWpjk1WjKpy5BeI/kG7CIg7U8Bioa0F64kyglhwPRT78pgIheXL19uFckYyDvSqvtzFPr

zTAHSmWoWPWJNSStCB3zvDwSdOe5gjSIUz4etzhXty
2wWOnzoODa1u2wLS9vPKwPEE1WyZABKivdqVEhOLwfslpaM+bMKo2PY1aTksOnhg8J1eg82tI56+/M/9

hUuLNLiK+0/cW1i2IjOfJlu2qMKP0mPlMSLLnG93+7w3vmEwWsf/qkZJrZGrAAdwdvqNrBZLyxbxQrOz

mZM1AKp+8/d4P+QVeBDYu/jevwgwqV6oponLGj94To
y2kEGYlnoPDpUN6Ayv1BqCHsVrVGeDhA1FhMmHuwnMPyOBAm43UveARe6b/SjU67r/dL9XI5Lkf9OOlw

15fmEJkujQO29QET0B5Ku5VzSYufCixwccjsITTNLY/TMcPbgyd3di93YWGFHmY0n53L1DSf/lKkgn1L

2CnEBJ6EkJLn3VXAOmCCK4Tn3H/M314f5AjknkycyL
u9a5cYjs3J/bwh2B9bo/+7Pvo0VStoqcykLVomyvzDNenBAt/qjyp74e5gQ+6Fl6MHsJqLyvDDZ9ajvF

/PsqfZY2pPPF8rSgADRbx2/R/dkSaQ3iCLY/X61xswobf+cc7U6TEym87bF+gX6r+ppFt8saU/cxu25J

9VAqPnxmlEzo2qSLNUgSrleh+0IwVI1/9uspMFi94F
L80bha8oRoKIvyYxPe2HadZ0J03vNSGfqJOXtysje99kE6zW1eXsv5wp81iargbx5F47nJlYXzoEeR9F

qjQnwh/5J+bfFfY7DHWBZORj1SiNE5RWgLQs9ivJbnsywQ23fGadKKcekW+0+mvwVEuPMSCVUiicQ2di

yhNA0fZ+7Q5T90rkJ3M9dudjsOXzrY5DqO8hSBd3Ix
Hdco7FrWmMqu7MuhAHCM7tjk5rXXxvm9gWDyML5EbWH5Lb7c+BQBRJ5HQl1TfHk2FlquSZK7Uk86wElN

7tcZpDOosdjvhVMEkcfKIPTLqMM+ea1TjFm+N0nu0YgYlSqrCzr1v20O8dFoGo+9zITYNOu/VbbE0IfV

sfF0J0roWUtQ/zMweGRfuZrkQZPj/QdDBrB93owDRh
SNje56mpZhM9p65J8zcH6yENSINDxEboWFhPkgo0rRU9/AOZcJN8WRDSFsxBCY+4b1xiquzUhePsQXmT

QQ528GTIyKBdAP+b1gq0CPPn6XOstjSqvaS3pdDDLMpGMEu2cdec26Axr1f+z5bDj4EOJR8vDzHm3h8O

BMQoIVcd4OsiDa+ADYstogJTUrktN0bKwHP1QkC03C
YyMTosTnco2CW4X2dejOu7Tb8N8+qJRVfxTp30BmS6aoNg4xhZecqkYiXAy43mrUGxJsQpysrX3trM5z

o7gW1B3DnoTFitvwCRz+RHPSLFpsP1DXpIkrvYK9TK0nDIB+Q8KGu59cSW4/vQw/2xfpqu0McozEelB6

V51ekTSh2VQl5Hlx28ulEuQOXcyP/9RFqXzNCahaUF
RAAdw1+cxhBLgTMpTcbzBqEe07FT9U6qRVga4bzcQ48Wv+vUyobsczsOCfl/uqI+i5BgwTdYXD91qh59

kMsoUrBtbOCuGYG98h7tNHzMJI4wgnI5Iu1y4qZYXl5wpeGyvQQ6YhojLbpteSgKSyTUQaGfb0uzlKbn

fT2cYi+wMHH03QXpI8B/P/tf8qvvLeiDFOXbr/0l57
559GJgn4k3FqVpisqfO5Rn6WWPEv7OjzNW7M9ZH0C6hkQZlHTFx/jPNBToEaq6rLTEVkq2A0gmuxRQ6/

Usg9fTlia+jHCuaSTXKxR1uaSDBVic3yRHllQnS0PPuNLohuhbavqwTykFCnoyK0r2o8Gaqm8mp2HZgt

VaH9/srUjsIfM8W0mli2C51ZYaQycNlj2DW7zhTo4x
aVA3uPdH4jgycB3KB0kVj696B5QUXAefAcSg9EiF9XEdqfw5wuZBunLe+t1cKkhiczwhdWTbMo5pepkO

tMKglbRVfXYYbPvw4wC/csl24e0v6jNZCc5FwxvhNkzgxICFoqQZLRo8vo9oadQBSsOSqrX5QMlxNucI

nyEz2/rgB0wH/rSgftWblCWLVpQqwhWXt4e8HQ0JRb
k7u9UAetInxgGvGcTuTp/1RIdGRi7lcjI0Sl8rm1KJC7r1dBNMaYS8H0aHS+W46Dfk81SsimlumJhK5J

zYMGd74Ysv90qbxAq1DKxTVatLmLCM14VE1POe8ei2KgyBegtPxbcW6EAJzrhViuMQBIi24zqqsoUlDa

Jtq2guv0hKHihzHTLyq75eHq9QOau6yWAkIQEIdXaz
C5i9cDUaFc3B+y3cZu+z6U36GxBuuyzkG2oS2qkQnidjnZL+D2u2opf+ne2DqzdR9/kG7CtZBVu9Kphy

3SNjAF7UexiMZ1w095VZadMWXnbeulJgzN2WN7KEG+SANvf4KCLbMQ6g9RxJK2P+3umP62oU5Mcdi2PL

/aANWr+u044BD+Frf3mK+CDyIpTd/ZWdIEcdniUwlU
xTU0VBjgR+lFakNOGnuz7cjyQ0UHnCSIBcdJi4B0ekHEhWFfbanp3JhdzzCCwWVLIdTyLl7t0DiPhevC

GPhtxcRzm0f148CamI5egNLmnGMEa/G1tinUMhxa35KpblPoP5egpTrpJaYunq1v0TJMV4JHZ5KNl2Bm

CpzbXYW5F5l6B/CxtsLq5g7cbv1apiF0sLu9zkXMV2
LynWNLR0zpoRoDDK8RkuHQaYP9tYj3OXGJfFO4KtheTfkTUZPNQJxJNEivz4u7ogE7Z4uxUQDTm49EgP

T+Wncq7VZI4xEqtapotJ9vs6gRS/EGnQ4nk8AwRB7YCUBKbW8QsxZhMZPjApsnTaeoVmhzchx/lSaVJC

rtzcFoQ2WDW6B+S0B8Htf1ufyIdS4tR1+nCHFNGUMD
ou9rWNVQWgfL+7jH1s7zflV+x2VnBq0P9WzxatDe5wITjW0QMYcbnPiAzKkE0+FsDOhpmIdII/xlLEUP

x2s6mAcR7aVaev3Kr4o3LCXtyFPXf/MWmb3EoZTK3WivFZDGTxrDI7C0sJ8nWnk8H3CUUNvc1BkpbyiM

PDtQymEqK78VRDR9zNkDCfyntEfbo+uoIiJpyLWy/t
4FB4uEquVrij901TXWDtQWqNxH73SSp3TOEMNPsfJVTaHIsTsRbB2YrWBvPFu4R8Py4N+KT8fncmlBGS

p952eI7aE0P7uWYtXwfmm/M2dVaib4NB75x0ljOgTLiM8JC/lLnEDUMSW9zA8VZ1RRXuwzP9KDIs2tse

YCg6dGNm/InUZerTkUhY8Kca4osaXsYharl3FQi6v6
r2ZxwhUfpd09Aa/TOhm9lPu2Yw2FNUSvlipesMpCvc/lYvs6WCylNbfNCjJltWSvHCHSSFBIR2dRrmD5

UlUCbkQYrFUFboQZSZJzMTZ6e85GVcjIIq6muap+Nv2SEkmJusLU377nqTS8dY+q86nc88XLw/7Nf++P

7BFdEqpxD59kpoxNkZ0v9t4IMu6MtDz2CMTs5n4PS/
U7zni2KHQSlt+f3DymmoXOaMQ5f9fFXP3Fk6eghvjOlGmkId5Wf2Amd1j8YpK6Ayv/Fnp3dKf01Kktkh

E9Oc8w17YA1W20qkI8YD5KPCNTtU3YzeCtVXeb5I1MxyQV/TfzYT+JBNpg028WdGim7CLE7P6JNR925c

zfOUwE74RQbuLTNJS/Fbc/3ADBpA829Bq8X3GCF8b6
mk0M4WATJdmgSm4UXsQ583cZL7JsJ7dHdSQt8jimkGkseFQaNTw8Ak82cIXlTPVzCKEavqm7lcqi02Gi

kiAZaD9G+Cap4FKJFh2Lgm45tVYMNyf4MCE4stajKZE9EEuSnn3BG2zvUN4rRMnb1k1j6atmL1qH35Yj

JWInVEdA/UDRpf49NdKLoecGwz155Gy70Goh79aEKY
Uhfd8NTNqhF0Xf9wi13CU6v61IHzuqP2R2+0tnB8d6w8ca5Dp7j3Po7ZCSprB9/ZidAIIKX3Om3PRlqf

hEkolsXcPglAbTfoO2Ux1lTZp5Jat/WWOj0DVs7gMrh6Taa209aRqVeb3dbpn3yupTzOX2mLe+pn5sFE

+o2wXTt67HPMMgRvI2cNggm5eDzDZ8dyOb4E7XwPcF
EJ1iy+NFZ46Zman912V0ZhDPaAXANgihkTRWmTKy/jedOcFJmJYdTMa+fe4lBDCvKBt/e1aL9Sapdgan

rgMWrY4V1TLgvM6M/D0PrraV13Y83C+SkBJD1WMPVf9boszp/SbqRxrmVJWo+mtkQiGxr/BIx5IGi5p8

f87eZHlx4vD13pkXJpMaq2lGcAGRyf63pts9QMPbLK
EaSMEOW2tPk73M9eeK6b8y6mGQGcyoJG/bu1BXRgURYfB5gy7b/t9H1IUfvRJPvz7a0pdyPd9YE6hKqF

nAoRzRtXNm3bwqlVViy6D5wTp3bFMOjqTToJL9Dir27OZpazqfjAJcOvVwvcp2KAyMya/hMCc0gSz9Bd

5Qx62sF5qd8p38UygX+LxevXmlCD2wgRWWWCQOqA4D
9RF0QZKq7PFYfwnOAnwY1FJCbyDiQTnOFrioibgthMXzSXM+/0Srw114fuWdxc3LJvaOf3w69D3Sllrs

aELjpJgBA6hfPrD1gTnjhv78IP+z0gIF87qsq4RzwGmtI5mefEV5XEY9A4aFFTehlf6dPY0PH+qhETsL

L71K3sFvgd2CTJ6FyDmrHl0DS/81gw8guYJMFA+lCv
ytTFEhLDWb/Gy/0vgSNhyDABzAOG5UgwFR/ZJBa5hAnEPOROX6w1rWK6ah0neMFGIP4eWAdM9czsHy3O

bsxuKeHMTGfTHSYQyD6jD353BbzS20vxXdr5LCSjtRC86NyKVuFlYNphdr+r+jsQrjwTk9p/slGKDodd

FHSEbj14djlwrdXtDtTj81raHb5Var8QLvMF+bDvdZ
/leKV8o6/Eu1yKMl6vjhU5917TE5xFWC2XhV8a8CEu59Mv5aiS1AsN4+LqWTKmIYqbpg9qKixDQa15Y4

94JeGb7nrwAXENnuBGf6O+E9Nd7G4K5yTpxIeuM16SgZmMy2SudsBjjw3FI5wBVnhAX8mG9CKz21A88J

NBhdrGCrOZt7r4Xu36CiLo47NeoV29aJVh86B7vmJV
mWgIBzULE77MRxu9Ipum5P+U4LfgPCLFvtpM++bW7NCQhtNP6s2tfN4R5Ll+HaHnOA4DKVzv2L9nZIhU

0WIZFsCnDGZoQ0cmMvc/MML+JJwFfzRsRAgXV9GcEpGM/R6L6PYwdA8UNKJihWmroBptK5v1uhwBZepF

sf34+vz0+z+ls0VQ65X9WK593M+Opv+BGk1FifIEbR
TcjS4G/fvLU+GlN26tqS12jG4AyP0WWFYwxMZZJJ7d3J49e+i6LL6/FoI+ghstUTVlmefN+hCtS9enRg

4+4pyTOeD4ftl4YF6+gZQjnmNodKJl1DAgf97OX+KLJU/5ghAiF0XUwXGvvBWI4xPQvonjqlA9ONna1N

k5Zfpye78D5q8FTqA/RTiRvN3r+3xC00IhnZyX/8IA
kjGnnpWICTx25hQGtl6HQMBMZRGaYQJtem6JN9jfSCpXkNPV7Q+G+v4pryXq5rlDn8ASPF4MSEiNIJ42

5/3VBX/jVhZDK1+NIXMpLegP1LaxrpGoIvDoKsvhAd5XyrX1DXF+RUZrOhs9cEB7fa0vi8O/kws18V6q

Dnx/LJHM2HQizGiTLyFOtUQxi3JsBnEdOXpKyBdYi+
8qkdcpqK4AcWfkarHUpGhON7JhSFkHqiUS05v+TKb6PO0Hi4QWZJl7xTDS4OJWg4eX1lI0nVs564eB2s

XOXxsKN+4h27If5vqMFT9yXpkpQJvgM9YiQiKtAEbEcpX3cBVflykttLnHp64d+aoyiMAqZg/2KRxjiI

3Ro4WL1q9KS+muJw8LO4xReBAMdeC+0c427RULRJcU
pTAmPgNDgNf1rYPV07L74uDOkYIINic3mr2n73TG4/guDnRwVwq7MX4yDnoUg4x6m+Mp3liKTvFxaKmc

oCI9Xc99h3S/PVPs8nZywnIvS138h+J796Vaw34n8yrI5ty4uqWxzOM301bmUkisD/IPGm03GPZ2Dvda

g4KiSBsmdNyF4xyjN509M2a3qtetxXi5OVJDoO/4bc
7m/KqnUnk+nOVSYx8bFJyy4CJdhrmkiFMUJnOgkcYkmah2tRPZf05p/5OFG6UGi3UGJskRC3VYkL/lEB

nqr/bOD7XyX+xVx0kg2AdvpEFwdL5WmXDHx3au3ECM7lb0ZKsCZuCqPs0vcVSF5wUi7YWaHtCf2/08OD

VwHRWp1sUXElFTAt2W3oRAv52G0Mr5YAICUXZen5CZ
gCXp74Wy7XGnGnExYmuABs/aGtJ8gvpl2iierTJI6YGo0ThwHd2MSwqWHlk2OK3ZYPScgVSOyRuW/Skp

7or4MsXtKk5Cm1v0cMfgIlHbpM61b/L6Hk8U9jDxptkSBI/7017ZBsk5bQuE47XYgd4KMcCXrL9uS1oc

v2O0/hjqj8y95oZVG1MuIvwCNtQbV/RvK5NRIcWxTj
TYmADuqkAhjVP9IREkgTzJYBfrZyebxCVGuTYTbWwFciifIQRfgG5YFLFPshyAx7IYFahzKR3oBrXQBg

/8OqrvI21AY0clUlyLWMLkQSCubazcHyE1EsWYr70r6PGwHAhaI5zzDsUCRTFBCfk/zTV5f3Cvkp0emv

lu6PPA5AAQyaNU+sKYE1n6FKa8EtrgKXCwF0vI0cA0
BkugeCj2RI91Y8FAw1upNRIJA7S9VsKtZRE1AU8bYjeezezSwssyhxp6lFS/nVb2Zyuxjs9zXnrWwIYY

zIVzXAfRmr+JBcoHBfuLH0zuI3QOyAuXvylQJvihcv2TJvudHCQrMmg0Mvo/cS/3xyLs9J6PsEfuhb8M

/Eq+HL+WtpzhEBIN72DBVSZ60qqtCUMDR/Vk3soHzs
PY2ZUHtH7NwWltrUNEy9Yj8NH2GZAZAVgAkMms8p06zSDNLw97+58Rc3aizobkhytM6nk4vOqBVAWh7K

1FE2CyvUBWLzDQG4aX/yXJ1R484aDHgf17loIl+g5wSblYROEQ7Jmw+pBxI0ARNBxogCJA6FiKOXA75Y

IId8BGG4BhYhDgqN/Wou3V3nWQmRkQdgqwBs22p4Ej
3wjMqvC1C/2nipH08SL4ne1uRh0TBce9EPvrPn6G3fx4mTEzpopNxOSpVoV71UXV8CsErjx2Hl49PolU

JsA/KEY2pZSFJwAN60VMnIDwY59H+EtHfaN+4s2ILpbQy1u9GSPQBaeG6uR6h6Ll78uBEBOi1zuj7PKT

Gg+OM9sUHa6duNerWrZUm+12mzUQ5hgCHzKPVuTRX7
yPu1Iqmhc6XiCrl0ixKcd7G1Bgb+5+Ex+UdCHS+u0evT9NBwiRuzAftwUFn9GvdN9Ba6txne18cHB+kF

ao50IT+0IiPTK8EvUC4OMmGGqNlxP4uoDpFOC/Qzz/+gQATW/WjyNF94coF75EWJjFgeCJGiVm4uW5gX

wgqjTIYW+D3lQDWlFWZeApoYvGB8XmfmEPgeh9Yhzh
tYJqf/3wUMjhntDUakrU6MrsL1Ay8SGVFXhrJImDxXBRcV2d9k6npoM6/Ptj6Mb8q00qdShCEtMEQDZm

0UXQ69c0iLAOdPDusaGw1TSTOiuSHMi2UF/DPHc9k5gTs45QbLMsjZCPff0xyQs6PHoYs/XE6AfkZzk6

9M1TiiGaTEzLdYwAI6QAHK5jCLQs2dtrIBY1aqpo8A
SAczfuSKSjkOcgIR6j8kg/3tZqwnmBoq3prMa0n6OFsnF+I4g8S4qTNmwxplVmv9SCv/i4ai3M5/43tD

yAfqDNPBtWh7+xVHHLPa8AI0tgTkDcMWQRS/tF+5om5IVJS9MHpsJLADoBXArj/62uZDDLLGBkxIRxiU

S83VHoo+2MeJhdMX9crf03cNA0/I3CAZcjE5j1l48S
7HXM8XfcFjEto4NgIvJb8OzNRmWdFVviF514pecFPju1CfQ2J54hij5WBkpNrwFxiu20JScfDX2/q9Mp

iqaXzNsw9dt/2QviGp+VEk3JElYTcAoSErAAO6J5dxSgNnv29wdtgz5T/TqB3txtg8VS66zAaxXPGDy3

EwkxdKyx7dw8tEnwVxXqOBeb7anQUb16sVkm/I5Y9i
a77/moiuH5755E0sjw2lEMDleTP2bMLo6U39iw2GZQ7bRUuqwlaQrSgzuBAWGSps08wLtCMhN3HG8FaB

4GVWIitOpS7Eg7cnWcHcGHXL6wALMXZEl45qjR86i6m1YRQVZNLVZnrwx7I2eVGlsiOzTHiXjH1Nxf/Z

+qO/B4bI/OznuFiO+EFqS4lTvkrfOTXYHpH28Tc3BW
cN519fMURPtSQX9MXaCqc+jM9cw2u/Ku9z3GEDin1FC8nHaOMr6HXi6l2YntNzelluSEehLSOx/+WSWc

b22DD5Wxl7xw/LWJcPEo7vu0pz46ncFC7GfENQoZT3PP9sVEwvbZcElAuk4wyGKt5Aqp/qoe29gefqpl

RGR3nbDHlEmodMrDhjKi2H/UoClHxhiyXvyh8Mnk0N
8jYzlzzXUwpCzgbbispvf0s3Cu9XB7/u88yRdW6J8guTeoiqqiqsYJM/xhBUoZ0mMUmZEppY9v3zor1V

oIGtsdrarNN61K+9187y1gbnzPUklhr6VrlarwXl7qj9287aasrUgFNSGozBMZ+qgM4k+vBADQsen3dz

kU6F70GDvllp3VfB8GWBeT1B7I/2gcfk+DiqiWp5KY
TqxSZRpr/15eWX+Z5vLc5T/QL1AEnAerLF6jXjnYu92+V73CRJcSyuz4gwJb5oTTCqqZulHRH78BwQ+F

arOCZg0ElMGSOCZZY6/J4c2L2S5NH0etcE7jV7a10KTqe5AKPJivdSJg0uxiuBEbKbvXgUyGKyW05I9J

pC6ZB/WnLIRePnv9+vWZ8OcLAXqXVw52uVMBwPDaov
31JGWZjwNefTiGGenqrsZ79eJkPjj5MvdWSug4sC159Zo21k78ecNvXAhYQwQciYhnP+Z8C2B/dVABsl

VmuNYkpvc9wheTVLZIXUOiaMtc14mJlt8jwcW8LEqNFHHLQTcWgIVWfj8/6VsW82vL8V8wZ7KoJjGT6y

4fIzE3zg8iASj2izCr2uYmxdhlnxJ/e0o3maHp3Iff
TYbBKFK69rwW1oLkTsrn5T2/5eMJhanST1+tUFnEuzD8lx0XIYVwibM35Hmoz5wvI1pqj3BxwaWW1nch

KtP5QXUzlrvdTTlfMPUMSE1FGOS6K2BEBCqoodG7U9jufiW7oJKtPdZziNnrV3Z30sT/R79fzTS/ExSg

z3R+GighGprOdXinnZ1C/o9VZ2CtMrjkK0WXO4wKvE
chKvPD0PfG5I1wxHftE93RnK9dSI1JePGgyV0NjnGl1uCHgFJwpz1NE5gTwN6oSW9pVQ6D3KfHrJyPa6

9XGVA3fkSlAOxHZ0zVzK2tc/obkrkj6imEem4Y81SSaktnsa6fB7nhrCRpr7taQ6WwPQV2pnaVk1SWSs

6eiZ6SqbP59BjIv92ESZ1qhb4CvoL++nDSJ3BaHASs
Y5TGDZzwyKb8TwzT25Vd9mnMdMQPGk4WjGtWOs/T9G+zyNXyDkztojscy+2YHS4UDd51w0hyHlCQsxX+

4y/RkPmo+Lrek6VB7ksoYrkX0xs3GkQV0gkmjjulrx7sNHY4YKDkmM4NY6zuwzA9Yp6QLF+ayZSsC/u2

bUM0n1/bJqkpysrYpkZNjqPpSmWMneftk5fi4ddXSk
tx7mjuntW3yuibmM/dqTFti3zTumcj7dfLu6jbuLxaEQ+YqB6haPYJ7nFgFfl2khaVV52DjWPhz5bIKd

X8TAa2kwtKrmnt+oFMSVJNAloatQzASjHjI08vXSYULaEM0LnvpG4/o8R1snkz/Xkqij/eIh2PRo10tZ

0h2yXWzXpyjqoevLzmqwUBT4aRJedFfBT6FQ9wKW9F
V8SjxxBTsvge3EBdkGYz71dw3PKE634zJiOqQhmmSe0g5ECjOg/myiAGPpMD2WBROczbfLLwSMusP4LW

eoreikEwbIzg9+5BAQTJ8p9ZFr1O0PQ5kZ+T4xP1LLCE4wIpjcpaWXkV2RVF1mGMCLO39f70sQhuJUdI

j8xAQwwjKJiX2TLNhQXOwfsuEV25zOG95RtNYDOTn1
M3tL1vyltU0lfIWI5tzhA757SkDEunPLIDDo/kNYf8Qz++W/n22vgOy/VJVHDDsWw1LFIN6JlMqhd3zn

4fCY+M+eB8BVY8++luvdsL9t2qZfNdmFzKi/fIcLAPCPFCXbCfhi+O+5pYWzsWFrJcTFtcgCsi0muweM

g3zl59UfyVMDvcBNHIbO1hqDeYFs5USb+tny5Q8mtn
L6loynyHLyGpiR/lcHQQz1zoEgi86pFufUqxarIviBhP8yfruEmUxk3taNIqg1QPTdevdAXYkQl4a13e

+9kG8D1ncFvU16wZgdpGDrCxtQ7V/2A2bD+SSNfi+zOLRhA7xHDTuLX9Gr/zeA3115BVS4OrDDXsL91u

1edtlLgHCiC+ZZ099MpffJ25t34oxELM5GbhnsZkKD
08HFv8buLR//IS07+75y4gC7Nx2tDpVAjXFkXImylXtbc2Qzvkjl0REMA4OoHNwNBLzHCradEbiifbu/

YeB1XtkiQziaVkfrrBYpaRisQBpei1iScEmf+W1NnTTj+9m+p5fPt+v//go0u7UL+gmRdnSEjyEGZ+rY

L7Ou/5cAy2L1q5vKdX93GZEpI8uSMKCVC1YXE35CEr
q7HE6Ko18BXgoBd8QhA8qTgdQ/3OJwRDdVadFJe4imh0EWbHk1Uk3cPZD5+3MTm+yC6sCzP7of+P6fT5

+zyl6b1t27wLgmstAfiEQUVw/1m17ZiT+n6G7cHpPZmRWMzJNHwETNrMkvlowov+5+0kHcWqJyEla66K

ZmfGnUNUbpGwaVW3WP51AW1YaZPj+MGZIdN2HLBJtK
t/Wee6dm3C78gZdou0i43lTtPDzTK8gx+k+T47rpUL4nUxbSucO6RmAuyhnfr5fSz2aXm8J36v6jtn+J

UEUQKtyaiArI6HJnGtN/Y0UHPhKg9SmdwC3X6ih+eYSIgouBOFxwy81Dkb+6icQ0Giqy/m9bR5cJJVG8

gQcULjDo9nst7jpC3bsIpr0Rd2lgbk4sPaiyqwrJ8w
B1GLPRM0e/U1QYKTobOVMsjFtw8Sie462tOmzr5eTFMxNF3d8eXb3VvP1i1gzfK0+VWPEsmfT31F0Kcn

QLnYJJCgM7KFiflWS+3ezChgvIfLJGnpfoO7Cj8K8xRp77+F7ItzWP1k5sa+1ZmO0scbktUVizS7uxK8

m5+80x8jDVxoghV90gPmRBpF9FrO0xG2GTWspLlHbc
DSXoLduUcS3f5GzOJMxjJqRGfL85ziJ1mqcurx5ymQEmRKqOywRHkXTfzyNr0elE0AIBUcbh1o70Q7we

r8c5wRaTEXjYIEJb4ZUrLXTZ39rsiqIU7DBGmNPUCuEBXJCHO/XO5wavTIriVV5HznjPDZ5jew9yUrHa

wQqna73FF6NA14msQLqeNMvzKMl4k/qQ7L6AtsgtwZ
QA8/7N1sreFKe2zhjQzvSHLJ8oZxgf+bXNZT+L8IlQ3mrP7f6yqws5H310ZN0a4aDw8/K0a44U4SRdED

TcXO1xmcvXk1cGXm7F6jirZTW6ptKELILyPBcqdXEvL06Mprejm2uE3Glrzdmv+n184e9terwh6X3MDI

aOMBiRq3eKvOE0m37a9Yjy3+3KUFtp9lRKBaRvP6uV
ZocZg8uh85PW8UNEvY7++YaHTkQGw7T3ifYac7eR7pMMAwf9a/Y++t0//l1qJq7/AGyZF1Ri1MxvSzl+

ir9Hs06QS5F2HUZTDuiemKy1Hwl+sZKwQW5h3TN+k6e4aBQWNS4XtfFnYP9Q1QhH1XCkubQtCPQWSVz8

DnQ3RC2D0oy006qUEUYSZTcPpyotVNM6G8Nsn31nwn
hLJa8+U+ogFM4cdG8f0ZHPn22Ej7JWcBAqhTWitYBI0CUhUM7CtvJfm8H+/1E4KQaVl/OUSEeonphZ/Y

8jMo/cVoksAK+gqTfqrFgjabe+aad2J/afHULhRnBTo2qdq3nimlXdzqG1GdbCKp9wxurllg7nf+5ILM

3rbz91cfCYloj/wnvzJgMFxu2mEljrnKauv0MjLekg
y1GPKHTQ/M9/E39TkI63TWq9bjh31teSmEZSoIgU8UPd4Rk/4BcoOAnxk2u4ihPLroRG3t7PU/+Ee6Wg

9pB/vmq74U+lSVfk7q8f8g3VuX+t0r7i/pBS0qsGW+82raiEVPsFJnZbWhsZbi9zxD5BbGcFuJncvIqa

lTicSeZgGDp3Xq1THg2dLsQvLUCsZIkRhOoZWXIu29
t0L71Nvm4TQ1u1J677kKgyvZCYDa27C9AnkBj92YschX9LAkeHJ+EP/RyagJwFnxlHIxL09kJJG+QIap

hDqJnlJFhI8UyPB0UaIIrQb0l3P0wHa09f7XQ0O/kn0sUw1gZaPQzTZ+ydNhr2joeZM3+Gca1WKemIQ3

htpBo8woi9UmEowC+JaagzPoO4nj2a4Qpnkc/Ao9Bl
4Ag5rb4QswjfmDMJJOHWEWgfvdJL2WIwGRdBOKul1sJ6AzKG6SKkkkEh5/PwF+vZu8QmbF0HAi0OSg6Q

B/o05igEskygMlDTE1ODibnBqa/HHm/yHNBV34URqKfY5zEMy7fw8QlwWaZr2OmT1aK7JR8CfHk8Uaip

PL9//Ymh/QkxRq3kXD8Gx1WvwWpPrRVemZ1A9Nm5ur
gLIJjgKq1Y1S/owI19Qebcxl/B6TpyzU7tnP1l14uZgy/ELTB2OmA9+D3HvO4zBj9LDwBV/18MXUACUK

5W0PaDayAviOMqFrflZMU0esB0m2OcaBZceR/EjNrMgseiE7nt4ytgDDT64Q6+oJ1xkEp+BZh8CIT2xp

ANa0G4+j+rdBCzq+Yyre1rDUlgECNwYuq+MX8JGTjO
yURsHdFjCaP0jzI3lR+Q6THkcmtZiKSf2gUNNUI9oZnIGP7mlJfyFkVTL6xs5//we6vWkUjNgtFmzUzE

7EOkqBlS1ijdS36UtrsADwR8CU6H57UYC8ywiAId5/t6DaxuEjUg4ZTxr/uMAO2FLlLvSnH7y1aLRhlM

GlMg37ZRy/LJPZxEUuxxQ5X+eEmF5tUls8+Veh+WwZ
k1DKedw+f1BCzjsoM8YQPAZDMS7hdJc3jl1DQHJ/OBwNqh1EyO7umWb+9lwG7b+Bxsfv5MQPuziTR5AL

YWMfUbLnYqbsLs8x29DZhGjoEMWJuSWQh4DTpzI+0oR532G/M/BTFalVNaFt9CZ3abmBQCyOa9OwqMUQ

xotQkWOcqRNP5J0v5FJ+URiKW9d8q8o+AZZJdkpQoZ
Vfp0MiGH2FTaHKbFb84OSRoBO7tBzZbsiJVs6PhQf1phxaricFD8nKXD1ZuA58ARN6250neH4MvegvT0

qSjmCS2dKATXd7mcIhN8Az0/nS7x2fuyZ/i9XwxZZe4owv/wUgAWHKkWPSb+o1LuuSHUss5qRznH3hJ2

XQ9yhRyzlso7bJTsXdjcZMn/YxhYGZE2YZT4N8yu9O
QDWBCyUKw0Z2qWODOLvWiRVDa98rDAT/BNz8WO//7jQHRKyUx1jd+AmyG6Wj+D1Mz1UCgYeV3RSEqjmY

LkC8tJDjmo63E0dAdW8Wbw6MIyA6iDLwtS6xZFsgOaXghBrtMpDKjx+rEfi7auVMrNfmDTIA2STkC2Im

flmgjcTwwqzLt0yqMMyalu9x5bA2ihdsWZa/wrW0w6
64cXHs3Ncxdt8Xp25TXiTMN8Md0lBzYLzaZobvkpYvcMtLElMVOFFuk5iLB8/30loOng0vKin/8GKC0a

aP86mUjTagWZI+gMqi0CUNlfo29Ztn7Omv3dSq/iHalOtP3MrNnK3yuCoHqLytC++j4fP2feWiugv+4D

HGfmVb5Upy6wGbnoSb0vfQRuXcpPTqcqw072237Jx5
jiwBFwX2vmsMjnJdrJNyGOFX2wH7010TCpN//Sd1YsEb9mJui2QXAL7YoP+tRzyVKEf6syw8JEmz/krm

m7ywx+EsBPbBCPckpe/Sjym3hiD8UTwXTvCzEqaI+7eVI6K1OPDAq707maOGIo9IO259q0IVcQKR2w+n

87vXwyyH4k7/RN7PvLyh3VfTC1XqUBF5OZsX6us2/y
T96RxToVOnUwoiFvDW6qclbllCH2YwtuRETA+L/JXmsvyits+aRt2nce1AoeHWVe4N9KtX5BqGY0e1+5

wdExNmhUpKDb6ZMqKN+L8WqX0/j6NJy/jZ8Ffwp/2Ivl4YrgIpSyAVNlNQGDdjLMCLowHZQscjS/Mqhm

0eQtIsmKNEN+9TRNwRqwilbr41UusnF2kzpJpynxJj
+XREAYFQhAZefxXkLEJahQzyZyzP8lIhkwm5zq3Vy5lY6F2WVqYo7PNtIlmzjg9XDozCRXJYkTrqy5o6

Qip3ekyNAxGYDKwkn35pPBmqMT6PJN4J/o6fQ7xQeqSNuDQbgi44S544rJ/e2hurC/Lm/4ha66KsLgFT

S7TfO4wcKO44/lGD5wSxXK2qnGgHp5vm2bHtrhu54x
s/9d8tGb5xG+etMZ8CTMAHdx5gNSowztEK9BroeJiypOOnn2VdY0IoEoAZ+ZtM2wt9c8OVrBSYgC2b4/

26uoYcbvcVKF+hVAZabutczT/cSqFkuhRn2+ExeNcwd8r6UaXihzcMskHNv/F9VfUH4xwbBVcQ/ePGQC

VEJ2S7gihs2+p46/5H+pUVdnFqyGAqwSUvYNEfZy5j
G9/DDQkRdRzjI0mRCGNLDDQ6rJofW8NleTWq2Fiw+VlNpeeEIduSsuO3nFrCUx1UJmuIMvh4qN9h8fY+

HWY/YQBqe4L1lTA73fdCFuxdnrmmOSvfcNuB+xO2vWoCWpXlos4bOHEvVyvojKuX932aWBtwJMvlj3Dw

yhp9R4iCAZuLcUOiaGOrfI0yxE2u+hpsVgWR+3VFNz
ptlBJtkcl3wUNdgd8kvkqjTZa+jpA5WFJBzVK5gGubgSe1wCwGF3ft3r4GZxMcdEcHI3PQzNEUDrdID4

BFgqBdZU61s3rTBsU23hJwN1dq5FbiN5NqidX662pHOzLmRBT73cabv+RxFzodDtoes+Q6jG2p1AqFqT

Ie+lzhjcFMuWzGfX9MXzIyfAcKdnrzWT/L05RfdA97
X+7ldcKj+Yu7E4Ed0rKmZl4g80KLQISdhbz7Zh8bXBl5MHLITMYHX4cIa53Z1V1NQRouRS/Gj9wf/u/P

Q4P0WEloDmaXKoIyUmIlsivnPZ6+nDeYFCJcS2HoenYGFB6MeF5F9FZvPl8jZ3cJ1bfm3VLqm8Lzs678

8gwyZZjOiK5xo7PLH/30ShebpOv/Qg7dnzw6XzdzPR
K0dfoOVohV7NBJo30LBqNweqy7tCKmRrLWJDw7/9MCNJNKRwigSKUnBzSd5Yr+1ND/XAije70Wuvs8JT

f+T9/uEFN9oH4XyNCri4B4b9n1ZOjb7rAFE8UQOdx45brD09AaRlirxjNtz45/rvTTaLP/QyIqtfq2Sh

6fxzDtr/Zc7nq8ezZ5qMP2EUBmT9Hrgni1m+NINZ9d
YOl4pEgC3d+yGODN8eL8Gf+V+F0f7Q6XHyBsZ6pNw+BUQBCs8BHAWPCF42x/+6vBJ2QuPB1hn3kyvI5p

kYCTo883At8xHW556nlySBp08jV99ZjBnkdynPOhD5juJL/B1dwKSuXYLWkRbdJuVfLDqOfjy9x+blKg

aYNPQaFO0o5FkJCDjak47Q1ZFZQd71vlPTYwmFsCNj
Cz+nSaQV5wW+038NWk81OogYx624bqvql7UaE3zSMiqtZC8UKxqxjC+NkeWP/1d+Z5D1ADofnEPUBSut

7ENfed/J6Uo6ObOl7PUQxIqYg//tmPDqM12l146dUsRdHuzntMY7UH7fNQHRKYtDjRE/L6n50Rh5z9nF

BziXS2TWO9g7Y9/7j8ur+u954fvkObBBlQEai+wtsW
G7KRqMcJRRs9tunS9Q7mzRmcV1HJGKzpt856hoIlAr04fRhF0uaVqtcEXMI+Rvmj0eVJtJhBrT8eW6qD

y1xhTAb+OZyRRT3HNb85KRbjS+a/JJU5UhZO+uZ3gpGBwDg7j4DfJywRltt1HGFgcqf0D3imzZSgdouE

mL/yPlxBF2EsoU2uT9LcOtePsrcc04vqonun0YPrRD
1823qXiidLEfkQYWPlLj2+WkP1x5YKuR4dOsqJiOZc+MyfkKvINNXEIkDUexjUf9F474mnadw4d2IEoK

rL6T5siddnWEYyy4DUxtClpakS1uAazuZi47ecHoHJdEscEFU/6ZdvaQUExTxozPlBtsp91B9IoOX5R8

f0ZwIQPGIoRsGMEzLHfvmfgY99aeQah2DqyV/rJYuy
rtBerTKliIkZgiAVliu+cUQ+EKqX6eC2wX3Tk0h1acWuQliesjglakG24Vf6Cp4ZIGw82nGnf+G/huiT

y8JyXIVYcVNK9I6dNhm4Dx/z4xiwoylmuyGnzkffXA11v1UMVEfuDJAuR3L1yrl7sY8BlzObYMl6lakh

y44/jl50+1lYHzwF8lhewnuLVBH6nh0rRbOptn7onZ
XoMspPzqd5Vh180EBjxIKKHQJ3miwfVYaZVx5KX3Px+0dXOFh3qux4Wrk5F02YyzkaK3kWHEn+SvdUkf

yKWV/DJGcV5yuMENx9isKd9HJLTGj4L/wZxlB0uxN39cK8ppswYScL2N83sHh4xHFh0LQ5nMyYqxpvaV

YVacDhhjDpIYO+1THK4iz2YucqNLlm7UQsq4Nuys9U
loBUTbNBN0uMFF9OSrDpBv8ahy+UtHYWJmzWblmGTaY2TTE60yuaA7O2qZgD7u184T2F91jq6sF9kcxd

DZUXw0kk74Gvt0zH81qDfLogL+X4lshk8ctaTMQKLJfYjoXPVioUBbWW4/pUnUkPXAojfZNsEOObBzRb

BruVrKHz/E9mN7dPZ5NZk3TnE02BZ3ZG8cJESajGie
OgPQ2AqUj8wUiaT61tTYcGJL/GhQyjZJWSr8keH4i0sTaJZv30ht535N14TaNPLvWZ3Mot0EunBKZIb6

TIuA597ZZDUulBr9pBiyH5BXZePHkDYDNre5M2w+m416bQiwL+qwmAVAoXuEH0bX1eEuxgoK+m9nDlZP

jUzMCbuONl6Dvl+J0d5dRzM3otIyokdfl1zCuhLLHN
e3NJJ1SrY0pW05akYN4JPR+0bhB5H/DPqmNCg8RyA+MYy9w4HfBwMQb1c4sjaG3xAE7LIx7wHVum5/o8

ML3Uj3FcXMonFXZBIsDc69mPpw49XjVMzs8VYh3H+zgdpou6PrxJD3PAK+1p7KWsfK1yP6Z+ztJRW/9T

sQOoZc2dxtkhiddHNYs8aaeiQHUmctUKUnFRRlP9Vj
VRIPVjLPmfZHxBZzJ8CyUQOQuVpbC+srvxISWX813J/LTq0u+2Yeg9YJ4JuY85g9bdPmH9/O0NKaxtYp

3COxCoMvHzUJYI/smRG43jFPf8p4ncFUhG1CbGmre7S+QQiWI3Kmx7fP2Euqt8qPk/CPqJ8/GmUDqzXI

WjJnZrM5SbWudlwG2yXUGlMlA7dIof71w5G8/JuYuo
2OEzvrFkMEbQHPMHzoFJx1p590DBoCoyaZxdXGJ7qO3qBLj2hjWfwuw58IBVgX2x2LvGNKPAJ2Uij+kW

v3cbgualEHSKriqhHbVmCzQI1VWI+nq5XHoZvps/D8buqrHICuOjjo7jjrA6hqfXKPtFbvi1lb812Vmw

civBpr7NV7k5HKRpAlcaLIE0xiP95TdoZM4Viya7MO
s4mDInw6xa8g2lv98P0VuInmwT5TyK7NWRRHyN4+++dRFNYD8epaVQVM3XocRGyd6t4Ikq2zwgbHqdlc

HkeqBPXXpK8AJ4/NLOgNpVPGuKyuYs8/gc4g33wCwixi2b/axnusyePu5AUVrqHUiqcyE4niL+sWZp2j

5V+mNvoakesSKaBfKrPBf7abklAhw/r+4lGYSUGt8r
/BGCsU6uAidBiRBsBkIim7u4mG8AYSCwDLRqz+A0nlm8K+kwJabIVFw5WMXZUe+Bz+1KO3pA8/TGJjJd

7mlRUVMp4qdBPRTAMW9tCpm21nkj4XZKgK7DT3ZTlU2JXe/oII8bciivXN4mcX64Kb6TNL57L+H8tcI/

PZlkn3Iq3LWC9pObrogaOtr1htSE9CdLosi1keHRl6
fhiMCwr58zweI1UZe1V6iYWu/4bZL91KrV/htkZNYwsISXs6HLZN+gcrm2NSF/4NNP4kVV167aYS226E

BLOUs1ltE7cKxVmoNEU+l/sGZTUTbQqVE2GPfEgoYTwXBMnqxq2lZzcjx+QIUSm6yUNC8256cLpCaCQh

0qrKX1Gh7CpNNJApov/HmMh6de0U4Szv8h9zm/YgPf
F1KDd8VPZNk6K9ni6hHWx5+2bzFWivpHc5IMHUTb+hjCZc2jJi9j7rco7olehU2gJ4IxuT2YvcLSe0bi

b1dAuJ4zzAZ+cFmYeDDFfzmX+bnBugEwE4VTSRu9bkV40dkOrYhiqa/cGamEKQr99BeNH4SAZLAMT6Ww

Klp7u49wGa6RpxGMWJ/XzovrGB0q85y2a93n/NZcL+
yGpDwl2cHaPP4QlwJKS6a+PK71IW0hyhj8ZYMGy+dhbHzpQzwA4XlR+eDfdAkci+2mWEtwPcgaQWBGXz

wsPtTMPnyXTkp3WkvugZiGZ0ADyjjl0NdOIIBW0zh6DWqr/3rZcjKOlCx4bXWWYFygdgtLKMKQkxsyUl

XGTXFqJGa/H22fEPlF3FTMUVTwZkUbW3eBANsJrfUf
mZWSByr1gtsyanL6aGpuuXoiDyLzs264RwhIqLQaKs5c1a2EBhWrF0s19MXeEEEcSFw8huugkvQJh4u6

dNalahl4rFulijd6gYWcrQrc8qVrOnX3FPTVfnKhQufk3uCNQmzxODvfg42udBTsnDUMdF64XWMLhARo

eIX7eAEDO88R6VwVyyLIQ0ypd+oXoWI2BXDc3cU3qf
OgMJeAuMpC38NnpeaVIv9BJ46LfmKcU15bq5SluyNRH2AaQlGHE3ssxPDbrwoV5S1jE0U48NcDH4nq21

fQBNFC4cKWw31Q0Wfv2KBWcl3BuHgIDMYrjy7gVMG82Tj41cJQWdn6dZ6awliIfCE73qo9MB0nnVLPzs

Yvik+tjC6IU0tU2WPJAt/pkbZXo1pEQb/e7XcN9fkp
0S4MQeZGlxD5OG7CsfecUbDhl6Sp0eqMw2bwXf2SP2j6QeysEwE6ScRgLTk0jq5jPQQYSygIa1bjh2a2

lIBdMEqAaYzuSg6jfVt2hm1cBGFNmrlGNJAgqoFCx/SsF/HB7fzbSoBt4o5SQvCfwBw3171oD45sV4HK

i13zDiYDxAiSBkvSBHc62uouoUxICXAj9EuigfpL1P
/JMOa9ZDjnziI2ZWEOECR2MrttaAqFXJ8tuZBFmgtGG7GpdKPRpARCwbAaEb0ZLKK/KhoolTgWw5j+mO

cIU96Valtqncvt/AhyfdmuwCl6sZp71mQaEarA4QOIcwZmOG8uSKrrjaFO3VVRWc/qsas5OWM1eM7xB8

x6T0DNgx/TMpwZnVO5IfAuwuQ+WkppyMc6JN7CY0Bq
PJLHfj3IMCj+3yNYk9St0JaBVzJWCZS7aHSon8FEI94j+Jm+AfZEp9L6IQfh+LiIOQ+3rj5uX9XyGz26

xdQM0ppWvDIKQ2ontrh+lQ6NBLWnKTYeZlclkIUKydl48cpBWJZu1S3o5pdPNKJhn8hMhpiDqSfipDCq

skSe5pQ1Lwg+6w7+D4G4vwEEJidwSJJWGnY+BWTskf
peugZ1M/rgp8aJfG2rQ9mBQs3sz455EZ+Mk70rsru+HEVClfB54sOWjg/CKRlPl8K9Pjqw6JTztHfKGD

AJipz5Hje34zxuVi5yLtOoMk+DieESYI+nD1L1e8h26sgUQZZaXs2Pk/BpnnzGmyz3cNbKjsDY8uVgHd

Huhf6L2GWDFdM5CNZyMQbXzpsA1r/8xcysGAWAlsL+
jKjR+TCKkWhzvWmhNeIvPHOhk39gI7yz5YK+d4mbzHfctuvJrchy5k2r7H4THyscYoygdomemLxuf91B

ZQoMJCl1jZUcZmttp2UJ3T3ojqeqp+cbje9Qgc5kOqLbCDK02imWzjatA1u3Ty4HTXhjvn7XamoFS7cx

9YiTAwkjstOpYmOYHopYvJRXMAykygLKdVt8zA4VZn
B2ZPCb3Rv2IhGPzBR+2hKUxUfSDFtUqC4IqQDccG9FHpij7Roz7GXSOhwc0lG67P7wdsB8WAiFsvecyA

XpdkjeGqFx/6ZMn5qmrvljrrpEgEo54aCHhmCpHzrXdJ3lW3afIx7S30CjXvYC4UuXU4c6ohur1v9vn1

xfD4wBE/cwo1S1h2a1WDaUosBZM5ctMhaDhT78prRr
pog06LRrRlCCLviMV4wZ7bz16niJwgdONq7x51RIAb4hy5+tz+fS0AW8eaHCAUXS+LKmM+E6aax84y3h

7XogmYcSvH0tMaU4Y7srSO6LqNajLbrM1i3SZzo0/2iono4vHXr4Wm8T4gkpSr454AYR6GYGbhAomwXm

SKBkmraBU/DQn9PiUQRH9gov3xpiLV9Gr8hPV6doF6
eBhtg3C1yLEZxgjETG0fyB+Fm4Cqy3b4zLUtcqsLmPJ59sqK344zN3wYbTvPfXdYa0wzI8OQhWQeJJ9k

rIzzUqb0YnFBCY9wi0iwu7xb5K6NV0n9lMJ5z8bjbAciLdRucqisV+P+VckZOYqtZv/0HU6p3IYqJTED

0AsItBAWPNuI0nnOKmwtN+BF/tqBtRPCK2Ixcqq3EC
J6mEkxt9tZQb4QsWRasS24rvllreAqv3ZAXLYx4x5XV930+Bcmw/86nBkMgZDpmiZQEqoI181wjuyjZK

GIY7PQu1FbQrgvQyrzsBxm3vBiXzVEPUUp1XJjLMzb4nvfVvgTEOcEitUerMsfJ12tVL9L+SucjCp2j0

eYdMg/Q5HOLfO9L9vur/wxcMDVt65eYff7TospuEvL
LYXT7B9gxL1HuyVnnfPsYSuDiVULazYGr0M5DRsriJzFITfJSS6yIkVSUCgp+YuRdmQQskHpvfU5uE2F

4Fyh6daQerObhx2N9h1r++gzrldw2NRt/f75sik1n9HwpzemeXXYfqqzbvNvKrkidr5oxg/kCvQfR8CJ

vH4vV4cYOASjsefHeslxdJ4dBNvxDU28605FXmnJu5
K6Cw3at5PYrj5BF0Ms3oN+IEk2AkIJSt92ebgj4JWGW7jN7B8AormTlU5GP4SlvBJxsaOtRrvWLt/Lbf

BMEeEdY19Vt4e2XlDY9nrKWHYBnZwclxaS8TUZpM75YqcTm06zgVE+FubyHobOsfAEzA4xPEy9Z9jwX4

bpMxejCVRD4oS86Lmx5cqCfQ5/tZZiM+5yK1SYNW2g
DTxMLXtVNy+C75asoIoI9VQM/E5ekAVzP+7CNPYQxjntl5FvW7mlszSqSNVkO2/u+9bw0dXoNJfnKyJt

/wsBctc8Oprt6fWLe/Hm5Lh97Ui4ectBsT4LuaQOjz1DugKKeN/OsgFad9xueqhMi0Hu1WSWOG1F7ml3

ljYKr3Er3jAcoxNRmNS01RQOyjXpIF8V719CbJv/Sm
QLy6SIg4MyPtPIPiTEmBuu23AixQincuSEwM+VcnSRHVdBGaOp0314MEUvCspUiEfyOWE0ixoBnwI0aQ

+dz/bwl5/6/AwTn8zCdkYh34hJgXl9P4h+LGGIUb/yu0h4NFAvuPxgJ0kaDxMrTQF/n5+ZgigQbes8ga

D5ZeAQfuT4PmILkdsEFvQFqXeCyhqidAr8q2p8YfHZ
TWB3uy9dseLx4BeKkXKYH3Ga44Xdl/qmcWqKeQJzTsn8XhMBjIgCMgvN2FuRtuIoHbe7M2m3l5gkjApl

sGRadjryGsZzA0YA24HKruK53UYPHgpQMH5uuI6M/NCKOB+NNl6hj20fx7DBFSKXQx10+3QXAesiC4U+

iNM34/4s+ajBrNHy71+YBvy6AcqfX8NL3zLTVLbqMR
kAvQiFrIRnHFSGU/QruHLdkbQoluHNHv6f9WXxUI6oBbQxW6s/t7a6CNyPyuZwF2G94zpSe+mVogpGXw

M1f3Ac8HN4Ln8WKzfkPuwDDdJQt8pK2GXbymDgEOUpPGRYoFSOY/E2lwvThckK60tSNgVEUvZAl+rp8K

p9VJ9kcENXmvJchgNkL/By1G1jGMfvnB7S4x5cVVjB
z14Xvjbe0GWr+786TGvujZ9O+T22I/z0WgkjhW3v1WlzpbMciitA9UX6+OjospCW+gKg7Khgc5VrtA8M

ac9nmx0lMJeu0Q3foTqIuOt9L9ZxIDgE+XrEr+NkZl5d55DCfXJjtIjy9Yh0P8vycMxMePlktsqggM5M

QiGx5EfWf7F6n7xsCroJt7Y/NfLWuKrTvJxlKR651s
BHruovQyjayPc4vmo+yJ23HFOjVdN/B4Hh0f3dXpbY6oxUMm3ncKaeGutaIxVsALD0Tnm6lNHYuaC6fh

IQiel+jufxlshc4VxA0tK/tKvwyogr3HtON7lGFXP1OS5g2U7DLvEZyDvyD28E2W5SgEW3obkwP+fPPF

/08oW8QbUHRhkYz7ydU863O+iR1Vh1DtegrzYjES7S
6QRIS4Xeoe6RgJt5+rPP3g17IONAvqslf8djsX0gzlTjT2yu0uCoANJgLVcL2V6m4G1/Xxzxk9gnWdRs

g6XK+h+NDoDenn5fKdMvkntyMSQA9CL5br3tZ1gJO4TuxHXjURoCbvg61rgJIpbpRTdoFlgwaba8d8Fp

YF09W94bT9wkAiDvGSl6zMiydL52pyeqLk3JSJHJRK
xq0OQl69/La4USBCff1TU+gTeZh2iD1fZqstdwMrNf6nPW6YMGnR4h9L1rWrE88yN3yqbwRtfr4Xt9Vu

/jYA6Jaw6937iZNjkX/b57qh9rhlGLXfPxQp+Isfo0tsZpxz0g645TRkHb8+OTslS7VXOkYATa9Q/HAW

vpZ640Un8XqqAziihRpOaybNA5lw2i5OTfnBuFRqox
NAvzq7MHQzp9zT+pKLR9Fn330eR6qJKcosL9NIO7vwloifk0k82HDojYxHHsQHFR+LSVxWP3q5X2uxNr

oW1qDIc5Bj5YuRnuAOc+Nu/EAAEHApEBbQ5RgBpr0b8vY6yhj9iKaKU6+9XEEYbgz+Mw+w1rVJOeL4F3

gO4bj71evppz+UXPQin4Ig//rNIKYraxnTXXR41otz
+vawx8NnopUUDHhavDKhC7/FxXxUfPrD3Bs5DG5hGunz5KK6N7nwS0J2yXxD473+jbji9moPnwDOXE5B

nIU48+z06QbBvaeGbMYUFQ3hvkC8rpPVKc8ISytjPqcR7xr3UVgCGipdlq35oWOnyhBkc6KY3uoMWA23

elU4GA+LZcnI7Dpj+5TVgbJSBrI6QbpHWA7CkkfW54
04zb6HeWmD+am2PcnGpLeKktLikJUa1vd5HhGaOt+GtFCGxezi7EpJYgWylKTrEWUb3eYDG1zMw/qLVP

KsQbE+BhCEkoPWsYorOfsvcxfomNyCsEOud9gHQ7rs2P7U1My7BTFe0Qqadra2ZdsDDlkRnkVu/9Waa+

EZiaZavmoQgY7avxfDClCWcADEWjjA+09OW0bvtGy3
+ereu4DB+PHrqpnUaZaqoFtCg9EkgToN17EN2VYFHerUOarihygd+01NDsD22jyaCVw1RH8Xh9WYGVIA

JwCVnyFBkLkePTXqdY/na9l3DeIFi5yBsxGcMUGSIrKYJ4FjJydiHeE5OM5+LmKoK2lYIm/zUlA6YCG4

Y+37eRFjW1I22+nhdMbxKhX0AUKqFTrr1dUlUN9Pyz
XEabLCnxcEJcYlgClqBdsJiuAkp8gWZsyLgo+FSGjCTXKGdCvNW8BXbmqjjYhhJy4YWtPydCR+jIws6L

yGIaiht+1UHDAhV6GRrU90DH66bO7l1+/fG+nbLHm2bkxOmcH9XTdlGv6ujK44s6ZWhq2Fo99lJQrkex

pOlxbxFgIuPs9eFlL3UUU3mHGtJKPz00LZi0Oy/OpW
S7lE21MIL3GTd0kbSP9KQ1+Mkc7l6+bGSC/1qeX+IfKWZS9u5o8Wha9UdSETp7b4f71ihiRriqe3brEq

ZwJYCx3WPzsbh3N7pTg7pki3jRyphcT33VhSTVN+fSBgHe4RKnaM2eeUUuz2add1VeoQCYKoKl/GnfeP

Cv2JcqfnpepWX6UH9qEeyhxXT3QwG2iJrrQYimulqs
LQbBHlEESFDNQ+b1bOfR6aKY6rDgw0kH+1blGQdcG8gpvNKV9kS5pjrtyn3/x2bWhvMgP77qMQR5QSiN

z8CkQK5isMsfu8Q2+2ElHeXJBm225jdAbozrx3WsQiANuWlLUcFRYowkwR+r5rCbigJ5v6hOsmXKxNid

0W5+K4xVxQflFx7bJkQAtTSlqjPTFpZ5GhW1FVR5K1
VPIzIujsUEHm/HFsMRrofe7cNSV3+TStjF1rArFpvKXd7FfYuSWkIOUEtrPax6z65hT5y6jEo7VVwDNx

+KryByzxrI1WDsLKZ57CbRJvarL+nbCJUwWB9aWZis6uXU81KuIHFpcj2+bn1iRY55TlyMAMn+ueK6CN

w14gLf8CR935v5SH0XLNot4TMyqVFUpo2ZOMHhyq+T
4qXUvSkJNMa5KbLgkmyHuxo7nlbzIdp18ikszkoUIjqn2fB0spnXEm4BTaXWa+t1VsEoO4uaq4LjOCXr

amSpDfRHWi21tBrMN8voHjW9JiJVWna2rYTkCl0uOQFewUZmSbB3S3sQef6oqad3zqJtJ4MXW9TD8vYA

n90T1mlX0OafHruGsGUJ99MaHnpOBUPcXxathPsTk2
U6/i9SG6JTKO1FUbXJnn5ULOEjvhadZknqYdTy5jQCGzFsqc6NzIxfVvcR8o1D5B4ChHYRBVNONZgkWd

1emfUF8cczdIvREJxKvl4fOEWjrygZbezCoMO3iZIN+rTZ0jeZknZ9OxDuJ4qltCnWeltC41aOFkth/M

aAc8Mio1qoRwGSB5lU/zfV9LvQ2SiRErWcVikYquHc
Ij1nT30bIY6wchpd7TfmXepr5Vp5my1g2VIvnHwg+xdybeCOXjxqmByQfvym5gCkImYyZmkVR0DQ1i/r

DaxDOemj4Z6Hy1vqh0MFYbMOCpRPRx7PeDEB/ba2maiXC6TpAQd04F69anunNrmgs3+OrzOy2ugnzgp8

XW5SSlKp4TaS39Fqu5J8gub0d40D5sCslTzSnimK5X
qZPNWpb80qEd0+6+JsKkO5k0wJv2qv+wC3Zff7OLCodxxzVt4xbza+typ+VCj3N3XLtMYaL44r6/ALNr

D9j5VaVWOpwoRTPGbDIPtjoYFC+pc5XzThK5Dy96P5MnAMlhEAVyQaiMoN9msl4PshyDDsz1BCVQIRHW

2ytCN6Obnzs/TrO/8HFTbU7dV2JVRsIyI0D1rHpbyC
aqeUERk/LlEuSE4kWDl+h920vR+zuBaZw5m+GfY0I7w0Tdm+IR1O2aX75cUutPejqSQd4fjMuuJ8ZI22

MDFLHbfwrfGaxBRc7FPGoIIsFqXtvJDyZstaT3iJRimhxvgp7eTZ21qeQ5xLR37Oh6EnHTUp2RVZ4aRh

NaOqJk9Z/yMI18n5if3HciaaZkGHqA24ILaAYYU46n
632ciyC89t71HsybEteg8kTjWsVIVlN7WZvs4baLp75zXVUUH+gd9cti4kUYJHPYGHNyTwxJBoCaZVQl

em+74Qqu5ucwVNy0/QCoKqIbjuNml5xFoTT35ZdEkBaJNPfpve8xmHT8Ddu2KwXCeqO3YzUR7vkVogy/

I+QuHVd7KRtfP144MOSjx0G87MBH1stNW5zljQlHpc
kD4AEw//znyUEX1GPAUPXgPsm7jxmNgjeoo8r+Vp3QgoCiaFi5/lGsp0mXpc2Auk4KUXG7ne32CN/Gjg

VXwiwbLDBoBTKC3h1vBjiaw/7ei1DEnA+pkfuKSS/W60U6wLAgBlj14eYH131Pwjf97eYnWh2gij6lf0

BjXA/JnhfCsO2VrmizeCo7uKwk89VNqPyJlCc6TTqm
q0dghT9ZLAtxqYyRM2u9zGBj9nLYTdqt9RneHGppAc3hnzhdojaa8gLXN/JfkjHgG1PkX2RIE/ahN2s2

Swh35m1jgkBun/Jr15Jfx1IR9miIkH4FiikHa1vyGAOuXb58ezNsnFOmlhMzwnJA72OStWGmyH2PqjFo

78ifsfwEYGDv2pUkBzvKxL/PU3KgDaJrv3zeQ4Hvg3
F+FsRQGfknEmUW76XL8KuVoWidYrA9U64Xs2oihdvYv7dOXyhkpxxmgNNtNVGmHqDuVykaZyzznfQDpw

cGoxVX4IjmqBcq+azawx0mdmzQo5NqCUseoDh/+JWkuRoQsl+apciUeqoJTfpso4MVTrzQtJIgBEsPpa

3B6VLjPExd6Tgd+CIikK1JLtjqNEeEKvSNpUiRs8c9
tXbbDCHkdwmx9rHh/4h3kerD16r4KAnVR3KyxkvS6C2PM1lVTIqhQHDv1HZzGURpErETS1Zxda6dKRG8

UUsmiSNUsoKIp8bq5pzm/bJbu/8PW3t/A43VroN30lktf/pr3w7krztIrUdU7nh3tP9y45/fqsqXdhmI

LzWYPqyN/4D9/UJ9Yl0y27AH2NBttFFPGrKQGuKxaQ
84Qi8bzkCUP3lwnTNTWu2xE5VHhzW7E5DjAf4CKU5/1JCKotnmhHo0HkOJeWDXNyL+JG6mOY9VQLEUSP

Owrn+ufHg5Oe/5XL4n5tdIXRzLwu20BMkqiN8lpSCw4YEPXkCtkeuKEmC0RlTVl7sOP4rHrjbzpIFZqo

Hhe498B/1Ace8WgI//LIHzer/xf3Ruk8cAtaTpKIGX
nkvBZUcUlQnSeI35bAn/U3+xQUGAeLCoRjN5Qf1OutMoqEzVXEzhLaejtJ1KYcpaga80ioM253rO244l

WySLAo2KaJcyZBOI81WuT9K9yt9IBCgIlhV+vcxVCZrDcScbFB+WPFHzuO5SUyaoSLkgEIJv8M/UQODr

nUYVSXq5HhfM/Lwug3ZHPUHfkGEtU657pim5F02ibo
twwUjVlZ3cmTHT4nDl7A+TAxlM3heN7eO3sl0/lHf42kokcwqpixbR7/ms0Qu9yszsWrBqoR7kqZ6ntO

Wiyuu6r+GcxV5Ue6i0n0OxJnvjQaGmog3GvGZAieoKPpKRzeM8Y1ty/yAXO7rHhfTEyTP/OwIFr6pDrX

EZnudh/3XMuOOu1krTexohJZxdFVipjq0CPIXshSwR
hAohI7Fio3XrUqhbpKnZ3b/qJ7OFhGQIpm144Vy+RKU3DnQ9fqvv010q9yYqK6qPZLPPwjjudi2zoQWr

Yz081hEv2ALG5Chn11OrAxFGz9im5vg5YZ5NHb1QynIPW6kW9DciBepeznxH3rVyNVviPM5XXWhOftoC

icFlS8SoN6R0DF5JLm0lYERiXhCLKBjjmUHlqsZmAv
n16twEN5QR5E1qH93tUZiyQtLhcd/X+1ecPvf7dG7/TyfrxXHpCNTL+uNducFI8mjqxWyLXvVZnZ+pny

qw1L3Szn3I4NgvD6lteppcr5k3YJEF8GeIcnTKl+AzJEio1j7+AkrR5ePGL1NqNVO314/1WScqS33p9L

TV3w38fK8+HN6yyfbNNqZC7vVWH6laT5JQ8Z1C7HD3
oLuB8RMXiWoBIPCzb1wLuPbaTc5HN24sdF2WN939sPgpeAcBL9zZuMXz7kKFSTwB0ifZagsFTrfrwDsM

ev1DHRvjiyRavNNo02+M7lNnRAd7W8hVlpXJ8KTws0gEONhDDBk3we4J/2dZsXswl/J6LutrC8NsK6uJ

XlK2nMeaXR6Vbteqn2U68Gwfn+4AJvWpQ0Nrq4vScj
X64pMoXqEFSyo6HOBjwU5iv6RZBaeSmQGIVO5eyzitVE0FoxYSj5ICqIR7ZqoN33fkRCnxAtzO8dfRhl

lIgCEyTygK7J6DyuqXLN14bc8ll+e+y5nsi1XtV+dpb9ZL1l1x9dyc0dDpGCu6L8Gu5WOUh1sdDdbrjY

0mWkL+uny0hda58wJbkWDzgF9ZxrOIqj/LeHZeFcCl
pbF3fqb5HAwYxi80Cnno71sZVb3z15jKv7a52HL5eWV76BuMdeyPKV059rXx8+BTVoAW0+e6SMJ3CwdX

83PmDkgNt4ByQrek0CuncNf0MMSh8h+DiRpGKp9cYgsMdD0RVqaYU252abKOXcAJVIK0JEXWeaSEkMCn

sv62tssd65JGPfmJJkyUabctvH7hpatPGqAOR+5sdD
ovKCzv0yuyJD5IgW++zRPIZElRkNsjnjlDnn5qIa9C2Ads0xns62VYMwxrTiq+i3gWXLM7w5xpLqR9Ej

pHIy1K3vfw1q1969vCZ+Rt8bBN6cTNI6O3AY80zLTBer/3FwWgQ5a0EbfkMwOiXs2mstIosrl/e3rYUx

mi/DHNtGvJjsZvPeZX6ymLu+xEo5+63SGUnshkZHdA
XZJS0ZNuFxVROIegZBRlxhhQWCHhSpjdexMB1Kdbiafqz09sArt0nz4zAmbNmHKlBOfN8lUloi8smawx

qV0jyMwnMMclkeYFoORq8Pkbv00qJCVMsBRpMQKVaFvjqK1heaikjR12/1d3v2EPV0BD9ktqM0t3671e

yydKRJfR05rWbzCm/FKE5nvEo3A9+9mdVWfZWTwxii
pDlzoXpT2mbdViO56bikvCZ26qgQPsCUSJ2CrQMQm8MWJ1zqBzQ1vpQ3A/gW4OSorQn1lvj9KBmtC295

q647uzxHnvtFdsWYMT0zgiF0lwM88b8CHHk6Z5xjyEwFZz2Xat4ppwhQNeKwBnOm90gEJQiHregVYaoN

mpl2VE7sqtJ3Fbdh4UsaPSpg4qIVJZdRkarMpJmwac
EolHfrxk/hT7V3HcScySxNrFe8cMYntGlVAvrNix8FLftX9HvXTz3DB7jTE6fYsHb7mbzieNf4LG+NJh

/dZuQdJeE616GRrL80SbY/fU1JRiDyFSFhEQxGjcJPQa0Yrtr3DacdIrIRUGjvLza42AMHv9GdaY1OZL

cLJt8LFamU7q+78AF8zOnfmD3g2S+qIQAs0H3GK1Oi
/RBszyOI7XP1CqVadqG3yiV/dfSHOPjAMF2wy761VwXd2mGFPL9qk2jYaaeexL9PCWE14Cqo854HY16q

b+gSfkFg4rjG/WGsLUXSC4Ub7zEtq5xzdvqe+FCuLtzS4/MA6VgPxCuXziPOlc92ogxDtzGr7hp2alTA

XqigOi9u2X0/T3HLRmwamq/bLBwYviH47LS8dt/x5y
NKr+MnYvpmqqY/vvjwmWCBnS6Jn/tKy/DwysJ7XcaD5FkpKN+i71c9y/VHtngerLSPQqnSdmbnH5kU7v

7Otig56wxwIKSYQ6efoouuKWa9R02hUNPsossUf1r+FrQswzOeMFzzDZcbYYV6Q1wxuAzepBjyhzqNLd

oqULwZgsAnyMjrGWxr75CRT3tnx8SLdlpok/6OYnQq
X61OKDiSl4FPGxYKjfwngfpEMlTFPxrZfOS8PeE4iNB7jCpNTZYfUtEKhyRMyFmetY2GUQPHfZXCpQpF

3FdRnc/SLA4uGMvXpkaLsCEEU9E4P9qbl595geT7y4UKV4A078L78MHAua3VpqGk14taU4uQ74yc3ekI

mYDhkSZsXjibI4m/ImGrwZEhBzOzvzqUZ0OjYJ5Mtc
Bg/MVaLZeTEKdvlublZv/pYZVGnymtjzVmE47FMc8hEJ7xrOp0Jqey5czUcbtUuHoPMBNmpCiOvT0vlh

JlOGd9I23zRqJDFlGeuZnw1tYjN9ufcuaGg1a4J9oNiayGNve52u5wyexBls5oj0yfGvAUzP34v2fJ+x

K75UmKIS74Bb4DFStr8s6eySW7torVO7w8/KczZ9t3
5ptSyiRTCuYh6i0R3O5yloe99FS/dHyXZRs7CElBgRfkxyFe2YGhpjYVzf9rdIQvHOP7BpV87S0N72Br

j5DoXqg7O/5UkgFzar/8Go4RxU0X1cCxQMKIrBp4z9SY+CyoP4eOHiM7w3GYeI7x3NmY3/VUIA75bwWL

qyi8dUPQCsRW0MpLh8dZbOQn3f8PCMZhdgk9X3wEjo
wh7QcNIseKqR7zDOuLYxNKJgorZUaOaNFURh9ghLuZeCQ0jV1gqZvWLaG4dkeM0OgRy48K9Xev/qfmDh

8fqTQlZb9OU/I6AIcT4bPYGd8jLtiBGn3B/vkLVGcn/HPCpYXywlZ54UzGmndclGko4U9rlafA4jfvJu

iGV/w3YH0Tbtups6y65zcnL5+aGhi+mqoZr7i22a3X
7e6cY/ta3PSaSyx5ezdtA2MwiANjtQwR8Yb+5upEybzhuYrYd1+ad/Ill4jB/4sI0H2KBcQgsjg1mDMz

wTEue8eXYWA8GfFgWUjU7rdzmRCO9y75bSKSp6zGa/D3hu8jdhU+6qI30oTwB259egvAPf3TEp/1t3zg

Aiw86OutIFNZptv6DJjPxJ/rHyrCPFlR5Q1sMkE/JY
LniGpkSvIO7wwBTN8GRVFiE3C8zsK/bTdJSR9xKnGiBV5iteDU/gPI8gNZRettwwtdYv0WjUDeEU3Agu

Ng2kMFJEV9a3CoSWwkwrjAj57blfA7kAIjMuNjF4YGZereifLRDolAGIp6Guhbe7iKOThe8RJ/IA7b0h

xx3TFVkEjNkCqH9IRbAr4Eam3YWatA8BrCrINpMrq5
t5PBwqvlAHxo6541vygmIVauiUpp1JoHcmCJvj4rOg7NAXdaryvo/Q64kjl+5yVJ03e1QXwchgs1QNAs

+YzwvKU66NObAD+GI7yaQ1rT4sstxzuZZG8zfRkXe8uEJq9wabovJI0PX2bhEOMcnd1O3cJ7fXn+Yg4f

0QmwoZOaR6tUjrH6BdcY+je9XvrMHE/fNgjgUl1cGQ
dMMnaCSmaHyvjsrwJxS2JcPjdC+VZ5063exhyJc425ze76S5qdGgyK52jIYaBVzBXsBa0Ais01KJ6kck

u/T3o2O6sGYWgurpp+IGXq98tY5u8A19tJEsP3IgR+TfrTCG55TeWnqdqN5iGa+69M1hytKwKcO/zStI

AqMt7EmHygoP26tOgq7yffhHAJwHFOeUrxbCt+GCJv
UwrmrD3kpBXMy9xph41O25+ikej5pOHSgDCimonqHcjiXoKT84PfsJ0/jvnmSh0fIoz7TqnTglsANDR3

xyjBE2mMrIW6Nkpbk98xPMEM9A150ZC07ea+5qwL3JOtvQ1nfsqyOmg9Wp9VR4soMxWNHIlFPjwBx0Bx

tW11M9K4oa4UsxItN/ehdWOtTTmKshMvg2b4pS8JC6
wYpfdAOZKT+EaB/qORqteeZtK09m93WWfUHokNe4b+vIvLwTG+Qhl4Ydy2QuGvGj2NbeF/tfzhXPOUkK

LurQkIXJ4goqeG82UFUSbAN8NVpeJMiDz1xx5KIU5ti8riU9azKe15MNrg8bwqPpKATd0DdQVhlCfl93

QCV2ssUHK9f36Hvf1ISO1mAOO/Gdf1OwNfJ4jGxXht
basuUNgGpElJomGa2ijJZuRUrH/gZZd60mzT5PFs4MzLRJZqEUDMUix+Gd1nCPHA1zxTdRB9/o1OCRyq

MlW9otTjAyhJviDhdOeQsNXKWRfynMW9h0QtOiVAFyBH9i5exGEVKIDsqzrrn5YhYMq/qrSZl+OUIjeu

MYZiySmpvWojQwStg7C8RZDyBAFYo2eygOR+b1B1KE
9s+kkYzDbQNWh42N5+puObnz/AN+PHBozoXJRsmBdL5HYnBrCvFtuDdkrqKr2eSO7ad1/JdSbPMLjABv

OXKyBnNNHibZk4uXxf0uXJ0A5/GjH/6D/yWzO5UkNSCx4JF6e/d0yh55ANjDEa7VmXiwoNLno3IngNnn

JFA8pIkv+oMbxRV9qs0DHN0CMp3QT5TlTUOnBOXIAF
0IlBf0PWMWOL0nVJLNPhZL4ApDXW05K2zxWXea65TFgBbzjWgq4VKlr9GjY4Ko8tev6inysvvia6vGQV

NVU0D+LU1cFfRvAM1I6LKWdYqXjhXZ0dPtqzWpyQ87XripOHl5uPxPG0sltWeYzbYy2GvFzC6mrjzOI2

52UMB+pQb0DXnZDen2+7Aw+C6/3HCYI1pyu/NlR2LD
wU1WY6vp5pst79w2rCTwECV8VMHGXIClUrm4v59KGy6nD8Nn+/R9ZbVkeX6v4dtqqEIuFFgl28OKjtMg

dW4Wp4cN7sjDLBqCBTUbGJfay2C3E0j3lLJ7/T4GIuzFAuEiyw0cmOLai/x4Eak2XXIdLNZMgy4w7K3r

23wF0z6HASN7UPDBdSd7t4eg/nBvfXysmam4pae/vN
sLj9+Ah3C7PDbVKzWukJExtammIA5jJeu2eliUtgtmb8G+Cyefdarz062g8SVwuklbVKE002jjeyT2xM

AzscBKETnA0GUy2yq+CwQ7G2JR/akNJstPMiNeV6M4Ps+IrpIkF08Ymp/qHOO5yKV2QB0osEqmtSXFza

mkyaL8lsH3DaNptbTHuSDQPDM5eiZ5+7IEgQWiviRP
Uew5128fKmzRmu/iObaJ4ufoABx4xUYNitvyzb4FrC5AM8+QlwLkWnDtcOndJuaFGOVsoh6EILN2jcqw

9dWyTXPBFISCkmre8kyzUB1baUBYvau1IXiEMTTaRw2bT9ZUeFyaN0m0lBdwvSxdMGCqJOwvknfxkpTs

DBz4ork8k2yEq/jKPgjZGaLwpRoyYN+7lGwKc/46du
xsCRZOk0eMjv6yRCMnByiYonQFU3FP+5xzMpOuow23kZkt7gU8GKT59MopQ0EfHDOEqQda55dK1K5+AK

JOAQUIN/QISd4NrV1dehsRR9TESLF8vqsSnjrFxnMg4gQuQnR7n1RM3zsdvBVnlXtFmhZI6CFQ0T/+lGzW+7

F/+759Ow0lsxoj3JWmnN+quPxXLj39L4DU3WimZDr8
0dKsCXtWJ5pGQJ3VsnVJ7MLKFLexbtUgaXi5EExXRyuQnNZpvsYeL2wCgPy3z0Ji2PaGBkzbor52OK1x

KM4BbrVosdRlYxHaG34UyhRtps5SRuXqU9BG9Fwq2E6VTYKL0HgcWb6d7aibDE/Fmz5GfZiSLD/R0diX

Ific5vTu36BRjU5aQJ7ljqkeYY3WGA9vHORnKfofXB
C47oGSs6YT/W0oufalEuIMqnh96POJqw8OkKd8Xui1WSKxCl54GEGqkSraWssZySofmmL6UiL+Id0Txd

xqOXehh42N/Wwdb9lrN61PRWX79b9r2Qx2UFF/PYgxeXUj3lqmKHy3sF34SXpFlB+C+srB0gFsir3HHD

4Q2CnigQajbWYO4yFwyzJ2taZ8I4NIhibj3tm6kyEM
ZoJhz/Kp3FW+P2TozmX9KDHQuqh6YS1KUE4k482MTd6xzACirp0vdcO5R9mJc1X278hn9BRa1JkxW++x

l+N1C6LPBgQNQ9VZ4oraznvF/lLqtFc6gtlpQAQsb5IYHSKdYDfKarSdFCJ1ZY64+Drzx/XNrSI2cWww

AkHelpWlIwaOW9l9L6hLptoDHe+RWQWrcuzYPmjcmf
5ONPycmBmWViqSMI6X+eHEyoSxronBwN7J5d5Oz47YRtXza4LpN+WbHVtD4RNZanbj6N6xpy8clcOwT0

gU8Dg9kYfzYQb7PFONXQdrpJnfI1GfiuPj5y0nhlXtVTU6UHaSPJ46FEd0gDW63fkYh7iWZ0f8N3Odgs

B5H05Y7ywnLg1q4kMJ78TOdrNzOnG23qBUFtZa3NaW
Bo57Jh17pzQmHmbU5A6TTk48j8jvIwBH7wpQ5I8UkEcnIoF5YsHHhRmqnV2ggORp8KjmxI0UuhOgaOz4

eQL3HxVaqv6fezv5qxbVnevjycQ4cTaIMBTQm8OuOkGBVYwBLBM/pDDjerR9q7ySbUB7uWsMhZC94PXp

Wb2dCvrLTiD5qEUzgnxuuYli8Pra8R3VMQq/v4Dwfs
UUWhcp80DxFsGuAtIUjk61iuPwAUogfXqRxXIXdmz5mcPjwMf0UVChFig7uJGiUcODjDsOnPBK+6zb3w

StIuOCBSZPUwg4tefq3yV+C+HFkteCvZ6UqKogs+gLCKEG8VJmxSJa9111eM3ulH3+9TgAD6Pna6Dr3i

89vLfsr4ZQ8DtggemTRNtvirU8dnTmDJLI9WWhUC4I
VlQBxqliWAYsOTEMURLa/bunI7VhUKnYHQG+0eC6oDjappU6D7WICcntLayGv5uQCj2xzn+6sSD/gvQN

Dz1ZYN46atKmXneOm7EPNZdp9BppmezMihhO369G3UM80xs3sWPEb0xMxBm/WvO17aRrEzQRWS8w4JYc

XrooRLafDPqVtYQlyQY2MMHHUR7G15OwDSk68/zOqD
riNv0vYaa2qEeo8ejxGaM4pHRVH408nFMHAZ89MXUawahYT7DUoTNmNbf/JGLweC8+CxtpvjK2GTu6Dh

E821M3SEuOZ4FbdmAqPKXguVFLnCQozMmHYHyn4nmH3Q2oJy3OhFGkFgoBRlRh5jBL6XqnibFoxbucJk

JR04jj7MXwP9RT8HApxFqJeL41dWBljmLUXcR12xE3
rOUI68uDBRUbSKfa9DryjbRtbpBnJ9+PTygNc4sNijQ9OewBf0Zpa5pbEDCKyuRdr0vL+GazombYgonn

NzQHUvXHFlRwJ5QpyG7iqbuzlSINzksNNsp9U32WByeLX8YXOepmxtHBeMmCWYw2dGQRLIrTVka8dmfg

WEeWEh3fzX5qslAttTxxQZozm91QAUVO9wL2+hMA4w
rRENGOhx8FwEcbyiMkFXnU309knHRGnW4SvqG1ClfHfB0md4CCAkegwupMjEriYfYJd5m16klGuRFge8

d8n7ql3grQhGXQMCD0rdPfYlturURYlGcDWI/9nVBUCWW64AtQtHQGwiipy+mvUN1Uf4+L1TaKb9B5NN

afuQTnLO9j05JrhOXzgqFTBwmah727UE3fJM61i6s0
jP7EevznR+EzmDbJJluP3xMyLtfKZhBFJDHX1/BKyhlN3BIXkPSEnOe1XW0vfpm9K7cl+Enn4+RqTt3b

5+iVeIbhHu5gd9gHCgKm77yV0MZkJ52nQTk28cKd5m3DvKFnsrYF2Z6UZv5isRtlkp4pNg0lOBY08U+Y

aYT6FMe3IMepf83N+y5NAymYL/lxRjb7VxsUjgrdyp
2tHP61BgGMgchCC47omj94iEWC6GR/XOB3/Tb8u5F1HY880aLvcVZSgj9i03145oaK3fgjt62vcL20hL

xonbKP2SPC1BThgSMFq1/jZzyVTdn2TLkVnV8xaLR3+uAQCtxPLWFS4hqIip6/VAb+w/3D/cP9w/3D/c

P9w/2LuMv/zBHR/hnNf2oC8jfoPJr6BxEEUAnuWrYJ
8x/0DVQfKIBtxFBbyn3VOlfDomxOS1n/3dh4dZtwyifb+Mk4+aCJVmWygghg6w+I9gukzUGGgvbXmDc1

9cmaXAZmhtm7Kw4WqZOe/O7u7NYQJBxd71dBOtTb0LTXyEhLr0B0lZFq/Y3mc1dproLkIdcIvsgoo/w3

/qg50ZGx6xK5jzxB06dc5W5GnlUkbWCvmT0BFYzloU
pG7tawp77BZYrmd67wr1nOVs/VC2YS3+n/dqyhdRT/GW+7d6L2+Q+uVnOmzfrAiyRXBI1m+v1nxFxfQi

TCE34JwtofrD7xuhb43Epjo/U7IuG7UJNOjf7PKKa08tUavC8wjWpD/QnPJcOFFii6P0nBxKFVyzHded

2bqTOt+o3WO6lORvaETfnjUrvsdEb+pOIz51hgsltG
c0tIUjpL4RVKM4ksi9NH8aOQG0djisgodp3LiPv/mnU92D0cFKpdAgiNMeiGIk9iMGRJHcOkUU5MNzQT

oeEJ/eRxqe+tT5T6fRzNwgi5/HocZeI3GKtH/WMc72+WXc9alMDbn53nHbo7602VblCkMtPzEKpH3ca/

2zlSW+7yWOXmyWCK22gHBcWYYEofUf4AGenMGub2T7
VVf1XdSADXXSeVCnXheYXjT08EGLUCLbIEnbGp3rzn9Zu+pg+lxZt/Ggp2kW9ZieCPk8yRoO/9CnNPeI

wqDq6I6W7oyjq0pY+MCRroJOiVnjPK/S6DxxUkdYrG8qfaSwfTE0XPZVG94Z8ecXeysTX6uWF+KcOTqM

h1hhGArLrrNeqrfKUfz7FJ9uX3Zl80Kd+XCo1hTRN4
NxFyT+JVTrJCnNWPSeKE7cHI34O4Ik6pMvWwOJ/oP0iRWFyBISAanXVWdfglsaTOEpVf+nLWKVCe5Cn4

2Gbbi5OiixmbVTqXkyRVgGNGgn88JbVlM9Yz+Dm2x5e3YRdxAi8/+ZHcJZm3UqKLp3bY5504USqW3oju

MYe3u6SWIfrYpepZI/u4Bv7vvP8991xL0COhOar3cS
iNkG2hLnz+ayYmRjYYnjAVP3ucA9zkmABPrB4supoCFoRjGEVyZ4tMiwOaNyu71ox45xTT2SJXtC/0DK

UXswz1oBuN9Tu3vNAR37vDqLMwkFz9M1zpudL89uNKt3QEhtDw5a3+voZxQKLs8hmdK+vf1ZYHlmvvRb

YXJrKje9SWC+uLrjQ5FpSqwqK2Kd2X43c8NoGn86KP
xlTss40WwZ9aa9p9IyIj8RAhFlpNlnhYrEj9f/hiLMg6Ta7YciYX5Iva/B5PS8nP8iN+ClPvrvzkJrvm

W/M0QE6HXIEB0yE1EBk8bXciwjVPm497Hb73i8rLYWtFHNpXu1rjua5iP57KzaubVCiSo2p9i/ikwsRQ

ptmQ7ErusJLj7uTHqAQTcHqJqYoWJi5eUsrPyRcGA3
TpzY7otlXqHHB9/VhdnidaYWTukvsp6IQV2/mLHhmB/QHeU2b9snieBMqW1evR1QipmDxPPQmu1yNTFZ

kkcOOfPSIQcMqqEjiVIsBHMcRttxgwNpWiKP37LSC5OTgvG/eaTHvbOzsk0ypSb1hm98JG7KWvBGXivh

Zc5GxPrdnHXSNuu96eH6lebq4+sjBzkD1UBy8EuW2m
hdGU7ko+duK35wZQ1E9qVYcTfbf30bzIqwRnjqTgvD8ETZ+UIDYbD0eZb4GJz1Svi/BKzKB7s6Kwy/5B

a8+B5bryVTxJlc3rA+UDyg9hdrrk+Ioim++8tQlBwocXro/9TxM6MFER+r+8KKfs6o5Sde63gL7TA5S+

Yz/7RKJKN0MZCutP8d+M4l4v9qSe67C5h5+Yf0uWmi
vUWP/qCF6GXzGWHf45yt57L2Rgb9ldTSRZ82JrsaF8I1TIPXeCH28Lp8sR1ZAhQm2b1fpkPHL9ef1bPA

7zBN0R9rtL9H++WWF7CnUFixR332dDVyuoBgycTh4aZumab3VECnS5mTA/7OTUfalsxIL4rcRp+mD/nS

oH70s+5MWGw1LZeTgB4mHFXv2YP+T80wqaWrJuwcuj
H4aMD3b+LkaGUa7da24ky6qpeTZEe12KjI/Y6zc4DJ7b7Nx5lfYMnzkagZ2SRwejfQIrYtswh4pZIEq8

F02s6t7DfXD9bIj4TbtjcjMvfGmntnbBLhpX9m5NctCDff9mctFGJtbrfG6jzD6QVUy38T2GrRWwid2F

Hizki8bJujv/ZTPFl4Mj9cRXhHySS1Wfr1dKCoFv95
jA6zZ3gePlVTHsX/18p4a7jka1gDIamQFb5kihkKz53GeIDibs98LEutF+J3bMHmnH5FNkJ7/63QISbr

raA62kJojAa4n/kjV+gbsquiQhdxaicBmlrWtVVnBtWumiZsKG8kwyISlnjmnpHo+qaEZt36mKl3gc//

D4I9ymkRKJSmOtwAbBxiXKNyxujpH7W0WALmeyIpW5
5smbcMhjOeVWekhSU2DIqgZ/QNnAJ8W9zrs9sdbx5eSGIJCHiBqdRAjkGV7rdSVJT8eouGNawbRwwM24

IWPWw88WvJYkCZ/Dy6Cg4zOFjZrTT9ixZJROGt7weIXu1gZd8fTEmPkCfWyXwiSy/g0x+fa0LXvmykhI

nJnUaTA+fvNFCAnHsc+Maryse/HiT/tSJ+LZQ8e064FuY
pdgvEd4Ao4Ht9ZtSgOQ54cNps/ugeb1aQIlSUmYrY6cYod86ec/bQzX0pzCj6csUQx4Rad3+e3vzsHu7

dapfEU6VnCgBlh0957RbkhN1OcqbqUzsZaMd7I/lsKNiNmb0g97qa5hvPmwg0WlzxL6XyDECdG2jmzka

Vfl5BbxWWh2ShF5Vf8NuBBmSXs0IYu4apDpvumt74l
Z47+QNOcl4btwucl+sy06sNRWG8yEDmM40NgiPdakPIsWxUydghZ6bhKNUGS0XyHGNpK4sPjeBN3la8M

o+Mtvc9QDQ0rkte7TXuQ+sH8rRUHGwT6YroU/xNs1gnfc6Wejwys+nsxw027hlqoo96bDZvkbvINWxqX

lADsHWLRDdDihHHvzWQvO34UYqa3xNSu3rK8ZTB2b1
OaLZsLGGCPVyp23yt8ZD1OieWQtpJOO9lHb+BwvZ+bgTOHqQVbhddqkr3tV99ZP6GaSezwDnNz63QXih

olqZTS5q1VR2/0yAH7tKpg1LDwr7tVhDZOjSjdYEMC1cbd0VWWUCmPSI5nwjvt3m6zuuxyqPGQfIB9xx

OIUCwGGCmGiLWGkL7TPKAY6L5CyIr3Vz2p34yFrqGI
CMEes23nHmjrh1i1tx0T7O83iBiwJEMfxdDTVO24W2RA40ZsbdmzPocmuHxOCz5T0BmCN+EZGq1oSVKk

tCI4PRk3fzK1P0iBj9oriXFYCgBBBkair2j/L27Rh9J3DSbH1pN90JxKg3iaftNQdemkF5L6Pt8J58i5

LitZEB2WY6Yew949lngWUmYikP4NDLvQmzjfz9S6/2
IorzlaSs8vvcc6zllJHn4C0jnve8hRR3bflXGKpJwpwqeHEzGbp6waKGq4skX4YjjOwP/GaPWy7s9qNy

kd+6ZUgpFc4o20xE0U3EugM3qoPuILbgMMviFU5i7jaxtEtE0pQeNUKhBeuYNnDBVjnSoO1BxAOpWjs6

9jiNgPwnuuKPedH4BZ59h6rcpLQlOxYbDGumOf7Tl1
ZE3ZgB0uc6KypBOGO45Qjkf7/5fhxNdBz9rtP239sZ85FRvTlh0LgogIkrj+RVvsMGFCi6gQ4thEdIWZ

u9T1QJy2TIfpynqjdczNKiw6rwpmVU6xlxlhsTcTSh8ywPjqYR8m/kf51LQl2eaZFyc+krJxZh5gue/P

Xg05+ObgBVI4sJ+/OXA9ZU4eAdew85PbZr1J81Nn9k
J/zL7YqVyv1s5CuBwh7jghDzcpDHQTPn+SmT+mPm6KWr2pU0g+ovLoorngzdkuT2namN1CQn1Ey4Ywti

EP3JYGWPyLfap+rJw7fWOXaBzKV/mHotSsUy7HTqJ9jegNP3ofAD1LC84CyhF+5hzAmRtl6MKlRvpFmF

R7PVCwc35BVqfxO2Cf44CXdB99qGgZ5bj/uZkO6W/Y
W2cQf27WccCF+R/8lUlb5Us7YATH12JW7SbNI1aT8pp1ZOy6kCmkG1A8JvgkBUFk7wzeLILLdZiwoB8/

ncllPg9wDykAeMzvUpaigEipcCroCCJIuIizmxp/RkyarF+rv5U5S4KKIFkXy9oqra82+QsMy6oWBWcJ

/RnL0xDIsnAYqjnJUcBqLVkH/edJ2fGh7kxdWmPNru
uP7gG0faJzr9Rg1vaMYAU1HCjhCg1lZCNBKLZ90yAhC3h8v53XreNYaRqB1WPxsGbGnW2KIPXYvFp7rl

fN01h9S8pXsJAJO4SziaWt4Md3nnGCl2Xutx/GU24xEW6dHzORFBmOjJSefJ1GCL5ywecYqPziF3y1IA

gFGtlnqeniGpu7qeom6t5hZq4BaNK5tsI0xP0RA/Xf
SfkO/q5v5cS0Qcb7oR3s/ChGFdEWhLgLn+wgWEug+i0qskMhzD6apNruBdMDG0WQh6KIkUQ557mn47Pm

4ZTykqTvXR7B33JMjTT9ODi1QzxhiZBlIrrB5AvHEaRyQDy2mPtj61AKj2HXA7K6XXgyt+NpJNaN9Jf6

hR3M0W06xwjrPd/N0FCBiBozZv2RPefAEBWIEZ4bNO
PtpDfVjr/lojz5N08TVbrdysCqtPrz4m9UyCPX5KumrAWmC2SEJUuwYbmyT9WrBz8m4BZPFlt+vCb0pt

U6+cpCh/maB5c0BELgIXWFfdMWx7NjQuh4otIC2OjVVzdBQZT3MEjo8lt10miXfEBpBk7ATnaFkjyXq0

pcC9N+izpgVumPgurpQwGOC+E7SO621DFVgaYG3W3i
79lb1dyXgd3deVYsPvD9ZOsTPipmG+ubTvSyg5ppVvU977K4xN9Wrapj/b4s4g3W/vmE8JFDMtFHR66s

9MdAY7P/65KqrbunE/nVfW2Fckd7H9Sq6y+j81AcTmWxNHne6PaeJQhHx1eWtBVQTdJkThKnZoSPJjSD

eqafkq4/yieIoKpNMC+b0tw63gy7QFh7CtmqWBzkZj
EGwjUY9ZD4mC2mnV5zwYdWOgsmYsy9iCFoKj9Rvef+jBtmd9RuHAoQrtJtCirrIvBskFxquf/4PNv28O

dQADScukRlFM7QAAw0/TM8Nufg2D2XkdyfLRth8yWw+5mJHK4pz4nfAqx1jyTHw4mWSAsoBmlMe3ZRr1

e4ia985ec697bp+RHrTgvBr8QMaAm/xpwV303iuRxp
Vj3jvqGf1bnQf+IiwYDOnwcS5qfbLLxWiE/iAxxyeIFeWOIQEdUvl0zv4G6qEZM97PnCj2GfP/AuOTX6

gCgOlW4IioLAmDA0GUjwz8RLwJiKlCvZQFthHMClYYjhEAGokUvw7EXS0h26nG8UfLxj0nU2ZQMkY01N

XTZXEwly0VsEkFPaSYsOC9RcaxjZZYa4NIrIxRP7Ut
QFB5Me+g7gbsBhd8eeMgXlrthFXCGpF2UHv+fUY2x2XrNwp8okbCYg8Y7Q3u1OoJiOdnWWPotlrf593X

uZf5R79zv0zC91lv99L6Kr3i1vd/3bKNS7mb1hq/9L4XW+V7e56I3U4IAk38aIY0Q69nKk88XvoL55R2

+YOzf36T0iDjNUnfjjTLggeMD00ev1q1JQNXChnihw
wowT4p7kOzA6Z5bKrQVzJteR9um2be/oPlE/PVybKcLH8tbW/T0ZRTQKr4Sq6x85q+z/lpJPKtxvASJ/

T6VwuJ3vvcx78+8orMIVWVScsl9+pC2uc2pZz8r5C7orgi4g9mVCzisF0Yd7FmwImRqU2rPYzK2EE80v

dQM/ra9+5yxHDTsegbL8Dyddtk3fCCEg55VxZlt4Nm
DWG/oT7zbSkWPAVKPU2zuwe8Lq13y44Bh9Q6DPfxmZIUmpVSf7amabGrt3O1iTsnma9K90QwTuMMKeo3

sZJeCe6WsoEPHMg9WDJdiHnUgbEoszanGJpvmrs7XN+1XgyeJcQpwyMh+AJ9YXhbcA0eQEG1W00WpGwh

hisrw1vF4eVATR18ZV4qnrsE75a9rcAqU74ok7T32z
c1W1GQcwL83nA3Iqx8KT5bpIzNnxgM21VdYV0wLzTYTJ5LPcsv02yu3pTeCYfuo+qmj+AYjIkeGNf6Ro

a3uQ6Lt6ivtz0lbuwijivEQKww82VmwIG9lTPujUWB9M8afKjMgTm66tkZ40GzNMPlkpd3kYOiVqB6Vy

8MaIVEfhVhxvTsUgOI4t1sgsIFtu9DBSiMwWUyVLlX
FCLxpnPKnHHRBrteMm2iQsLnSYpbRRRk2Ezg4EfuaBxd4Tt8iF1EPj4ZhVcspuTfXs4giGAha1/dEDsa

Z4Zz93FA/qGb1dVNpsnTEpuly7nTGSpddHu0O7gGwhlc9w0tf6w6zG5YtIgJlXEYrgNgrclUuDV/+Freida

44WsT4sALfe98Wyl/TPC2Zhlh/8WFP/CTR4oq4Kp8U
wxTt8z9/zaO4Kf8Rwb6mK08oB6tK0N3EVhZH/j0I4KaM/B2nt0MnxwHc3SaIWcmR5F1259VABkvrhjhA

sr1m6jNtUtHqielj3jaXk4oce1izjFt5pgeBXENvTTlmThskNMV3PRE1nbBouuqckPOx4SBX63Oua4T/

N/kF351x9URy8T0T/jmkGMFhHfXa3fJJlRQAQqRhNK
4GasVgmSr1fMBUYSiLS+fm5F2vXpeKaUiXRz050tp2wQvwD2j9KQkylBswREYFKbg2cusU/2YPXW8fm7

Q9lwgf56ri+A093x335soD9yTNnNb7xa7BldSeLs1lecCeK1RzrQondT6uXLLrIheRkzq4ZN2e+5iEgm

9tEzXrPAOmML2ENh1PtAQvaPml2MynnIbOsLbmQES2
31J+GcRLOaM7jbyjeiSuFqUKImstShHibfq19A4Zk7kx2otBk+tCpX35pN5WfH09MwKu44g6C0LZY2nG

8vLHhrzico5gucgV5TRHbAf9mgAgIGkV4y0WSPk3DctolBcAFRQ6tTBG6syz5kJXlkAEku+p9vCheBWj

sJMbRMxVhHi2c0s6uOXmRVF+7qu+0b+OjMF0k7Js/K
OwapedDgD0DhKn1d+mLY9l6XvGmaTrRgoIgxboFFQN6B7WEJHts0aAFV1hPQ/AQoeyuCHiWtwnIH8Mxt

+8QZweMyBgazHCMAbLOWEb3PrzqongTuL5vQ/ePMmagDTCQPJbnxs9wZ1nFlO12OybwAjWKdfEx1Ox74

QW5rUkjgEKMhVCpyviJvLY80irMW6wpBYEg87c0sly
iXphb87j9bhnndqso36vhyrolwDMUFZHvPyInxppZ3fJCMDdXoFT3vXBpPOh84qSXluGicm2i0DEPML5

08lUksgduqopJ0gYoVIZoeJ/Keyp2haNcTaruKFgexoW+JfCM+rjqvyBt7iMbX5xo39sx1ZWJeB5d3E6

f5cUbbxraYbjxprLRfekd1UjN3TAkhc5abv1ajdwSC
M0620Hvxy1W3E1MldjwQ4KHrvMoNUPeTAl+MaW9+bYwW/UTvQQWrBqqKAKGjEI+q7uo1LxFkHO62cs76

T/IuswoboBSVQqRBZjSeXa11KRJxsY/Swr3SqqUfuz2/KMi0UmMaJiGshQtr9akXEAyErf2BjMrsKYTX

VZXTzqN7sbPxFN2IoEIfBu3CbgcIm317hyPu8NovYr
iD3RuWqxWNRm2VLO2qj4AWSQVCzQVK6m59KRB7/2DgbB87z4PraNckzFe/og44H76V391rHJf2HsH3K2

8kJCrPL95BXkKF2dpK8u0Hnkqcp/c9gIeTiN5hJfyIam+o+f//ej25Zp2LvmKudx3JdMTzgwoxUofYIY

jcuHbxWwImTX86OmWa9TscKuaHaCDPxaqci7SW6C4k
p6Qyi52E7IC+xZEmw2HS5id7OnZ+gAbXxM/jHi7mliUqgrY0xszGMSJzDCrntTDOKa7drVWw2pSPeup5

Tx+DtR9o0flega4cW4QdOgLEcN7tgVD6A3tuO8PWaCjMltECcmLnbU6B+tHiJKdUaZLrG/yZcvPCBfGm

gWQz5yRfm/j3K+pRdDBzFkXooflvxjQYPMXVKE6p5R
YQDL/zBO/IP/AxDAGqrUjLDWlWCMYyDjjBc/y/CMtFZgRLjRglfNrOHK6pZuvUWdU1nypeP0yTW0jKR/

lSHjiHmpeNylSzKJu9vfvmvnT+ka17nd0rsIYuJpk2p8inxRA2YA85QznPJwcimn7AstygjkVC+kxI1E

x+CPMVQ3qKqt5x2EiOcm3eGezCeAj7D3gGr0h1EcEy
QPTWfE3yVKfjta1/v04e7OWGgjAXxgO2Bid0wkBZQROHcXAvxuZLzw7SELMY2FRfM221JW27iXeNXFb8

YUoS3cKzzTg1D/nXeXcIIcAg2BoDHZa79Y0Du6O9QRpwRxmQPE6oZ8lEARU/Y3nB5wQzFOJ3GhqOV7vz

A+YCWZpHvlT/3DhEok6ycXqzA1YcAx4krGCtKfF8GK
9B4NUdN8CPt99XxwUOsYYSsdOB8enJOpEZ6dftQanM4zhdluk/AxIRH4IWi4i/QA+t6VuXvWXi1sIoS6

+jRqLmh/8NlJZcj6+0Nc2DfiXiar1648IPPh1KG/G4pT0J5vrUOKd4vhEyBPjlC7tTdjUJtcMbY53RTQ

5ssZjMCxqWspvWrsdgfxDag3qMQEZKeEAZtebnYF0V
8TcpLdU7KCQ+4B2EANKumqyo+vua2OgEj/l3LBF3IIMSamJGfM/cqctjqT+G1vmMdfbiF23Qr90pp5c1

1vvS1j0dGeRzmvcud2D6Zc5gL2wAlvKaZAw5PyOzs5q5QrfvaeSvalQ8KKR42jJMauMp1vyJaAd51Jog

huBgy/8qcW8i6FAlizUivutt6qSBaljroFxMcx+t4q
Ob6WFScE8IEiSvlzLo0IuRp0waEApwJIWBafoA1PTi2I4Ko3FHEylgZELswT3oMQgjwabZ9VbeE46M5s

THvuyXCk/Bd8LZJnvi44OXBMbYXoZl56InVVhNOlz2jhtnel9KaVBYmGDvJyBB14pUpOyCp+oRKFJFVQ

oJMSykEwl5qD3HQxnd1zdVctl+1rs7vBh3gfr3ZwD3
OIEZRtnTKNtJS8fhpVJJUS5tzoCirIELvXE5H0W6VSYov7ZBjSQNshDlFs2kiURhOKMcuj7chgm+Iz8A

V34VKM/N8FkhEue0DkLuvZPL7ybgOnIUTwwOywH/vG3D6tT2VziN8110yiFzgtsIO9VgeSf5NOyTd5At

4L+8y1+sv19z9la5Q+9s7Y2u2RGnudtT9f2FPU0+cb
OmIqcHRn9nd1rKcaBf9MGVtpPciR/YJqNQDeMX4bmQy7IVvpXtX1MGSsPYGPEKDXhy0JkBsq3+AkhJZ4

4QH4zEoTW3RYpKfIWFtsS2sn6/Wgur/SxgZiZR/uzOS2tlDOA5qtS8jQyR2s6BzPXTuRcPTu8ewvBXhb

PYh/4uVQLNliadYjpAvfvjoe6OMVyya1JP+7T83LIt
aNyiD/QeQ7tYcGfv+qfamtefHjfwyEU7F+29yVLWoMddQY+Pr27NauKrn5473kfRXqTCBAkWBRt8hdRF

2i6MkkFHhy0z/mMzG1JMp2GRD3gCUdO2E/0v+lZ0QRKEo1y1RWPOglZxjys6VTTlHrtw+QQ3rZEs/Gp1

fUMKhwwYtks6b5AI9h9lzN//SFkrB17AsJGPjVgK61
yF+Oo69nXh1xOrCUK9Hr0Nt+tUC66m3bUvvrSreFsdPQaZmvpvYefSCVwigf5Vo6Xdp2C45BiX5KvMmd

WuoSgWa6y1xLTV0ewXLUglfcuAStD1n9Ln15Fm1fivG0neX7Fc4fiii/qjgakExnGczq2Q6tJ3/84izH

3opaV28Y5H16GeHEM2Fun1KKOuztm95dNtV2zSTmmV
S1ByII09RIy7UsyClQJCKGKCNIwS8wf/AM9VCTQ3sR5RfW0+8CZKlPdmsAZR6jjo53kiPYrCh0dIAh94

4rFwabG2WD/ZnO1iUKn0XFiqKNHRQ05Y+hfxIV5ChC29LtEI/vBT7KBZEuAV9OoBXXGnya8cm4xD2W/M

IZE3VrGzGAB8nCe2bB9wSXTyVpf/Y44zBKC9cjAARC
i5ABSfYG5joYrku4v3Iv3if9P1FclM20MLrv01wilK1dYs/QHMPHkwJk9NbuIbG2shfGOuzQFxblT8cG

MQ9rnV65dwitM7C23A0XaGJe6z5AZISB6Fp/x8qBB0rIl1yR76RZ+8Xv329MLQnBMW/idFsyWa2mmRhR

joHj1TrSm/y90UbJ4qRVNDRN82wEBt46UVApfliIWe
3wfEXZ7Rp8PFPNwsDZNuvMKrVigcykGptT9xIPCDolvkotIK/ChXXW3Wc5lzxcqDe2iLgOh/PnkwKyzT

esAywftzhvG1UtGI4yyyG+v0L38DPQXeR8svLwCbf2QhcDRUV5D4Q01zd2pB4n/mGWWtH706yZ1td8sf

qmNd3wG6UeGYYe9rrJZi54stnNDQ+FirbVVnOMzUMH
3C0VTZ9/NMepUpGlCkSdJnnqspprUw2HX4O9rp5Plaay6AzLO+butNUA0Yn780TODp9lyekgO9I+uIpy

qTyzRmoQyWDCPUdD+M4prCdXpGn4g25NpdyencFsTxGBIWxDLLX0S0vmOhcXX5riehFs0YS7xX1KnUD2

CpRE6LXiQSRczS4QUHihdAjwsqcDg7H3LmzF4ro9x8
jA4PLnnMpSh+dt9H0RTxyHiH1tb7akDa48kKfReRMvdPnZ/Gw/dD234AcD7oJ3JPDe+EhSkg47uw9g/J

nGPP3wHJ9fvABq4jX5dh1XlVt5Xeey3eke0tsuXPamIGszsg/go9kPPldpi8bgkCGPLFG6Za6X8pTm8Y

EcC/Xg+/yEcOn+Q5GPJB+iTKRcrZCjlAxD2T2SUABv
5KVBq2jeqfvML1dU1M196Er/8no2ibNB9QDY+NOOqe6UPlY2M0MHaxAlYgcVla+841FmVinbN3IcM/NK

XAtum2Gv2MlSNZ4HYQ7WrznXsPQQoIH4/jAKMojPrInaCkdJBI8eE1w3nbpXnqw2IKK3jd4ft5+JjVj7

0pP1JlwGjMjT8gdzoaAsqur0LXeVdPK9R2cUjGQj88
b4d1rKS7CbMaCD6UM4gKcRDO1pdkPSBUUmB4j5m4kr7XPOg1haMk+uMM908vc+5zz0gJBNFvxPWALNX0

0Aj8JBL6/9yf2jguP8HYWC1PalnL5g1rHjSYJe/kbNAxYMhrP+bAVZAYy/iHJmGBguJdupcnmAEjrxLI

UmyV73YY3vNLuYsGTH2agwhyMIBD1nViv4i19hbe+m
ViEZffnxXOF5MPLChAr4FwygCPMbcIHeotQ5HcdJoYAER9WxQJtIZVsYLW7PnDCsec5tDZt7MMHilCri

DTkn//153Ob5kJ+ueymlWvMRXU91yuOf/eQL1POCzAUN8Ig3yRKNIDhHYFmMXiy7zSBybZi56AldabEO

0xger8TJoIbGEQ+NumutQLaCgvMNf1InHWBCFxpxKw
Bbmg6vA5n8PGfVmA0mWHCaIN/t009xuk5e2jTOPbe4U/gWb29oP05dnjKpnbSoxyQABOBZp4SGIRyOgG

UdNHLseiQuM5UHpfCBtgEyRMCdzZGhP0leaUDmez176DHayb7wz2ZSSEhpe6y+nrL8wZFZVMTs6MbPuc

yfsx4kHiTJbUEn2tjflbV/GUqB/h3nlKro/vzIVgXp
fsph6NecUUUM7Ewfz7WRfr4GU/5FsSva2/r2e9c+uCTOBscPsiqxtVteRwtesZhuf2UML6nuoe2lIs8e

X5b0btNl/fE4qo0iycjM8XHKfZz7i8PlA0gj0D+aYTLwJcjEJaQap9XDQnsNwjiZpdS8R6YnuECXmTQF

3dcy1Cecn+aln+itKLeDFlzRqOetGM4/UZIoEGxdf2
DVn8O1yzXzVQRUC6h4+sJsrr5Hdo8yQz8h6Z+oGW8N5GYEfBI7gim7AU1zHQ6qwJyFbYC1RPzn5yfSod

A/LGcCOfm31KplpEtjo1hxsgYg+jTl32Eeby3QK3cBPNvpdeuCQSE36HCHfvRJeReS1bRuEr92Jur0JB

jwn2gpAQdDvuB/qI2vFjg8fmk5XOVoRksYQ6lzzZiB
sjapHtuuptHEf/tugX2GfDFYMrjwDHRrnQfTFElIzvJjo9hea4E53pbbxmrgoRMg+Q0ZdjEV/27X/v8J

xvJIVzaoa9/hATQYjrxrc3PvfRKMskL1Mygsyd7SIOpJaD1WWIx/W+hxRnwIu5vYji4C1kCWfARtAa+7

9rKFUrZXxxjF3pPPXoTq3Rl30hEaoT5Hl+wkFTZJOp
OFO4mhrJBdFnbdqxZwYzjEUKUHbXX3J2m51a74VgxBLVDUUrrcxlRddHKYgMyNhI/G1/Kxj10ZlwmvOr

U5mhw4ihPnUUfBUxS5wuP059ypM7czjARZJAKhR6VgKnXCbPmE/x+7LLTn6kNJfaZN9f7qUbFm+y7A7G

8gYZ0rWcjxlNGE6p5qtl01grnZiWTAXn1Pig08ipa1
z+YvaH6rH5s9VW9d0V5wPj3CZR2dn0/pX2los4TRZ4S6v1H0cNo9tiE1awcT0PbFVJ2UhtXxpzKKbA0B

F/krnh8NhAXoZtBlq8ePfjaCCA7kz0Go/CDNxkH0O2aknU0ytKG/GIhJ6wP+6y4a8nBa3PWfTljBA0Vg

eLak2O263n5FIUERu8AWAuV2B/M7TAvSnoXprSHlei
hoCC8lGzMB8Mw352vbOY+pLrqrGhfvRqmpSGXwbCofv+3SW9hJWRucOoUJo8rWW0ZZ4oyhp67tDdk26j

9TPUr+yijynHskkZ4Lk9HoHHSTnEdZW8zEyvhypbN3FJ/egZ0TFWA7j8m+4E4BRfkj93lMCK3KIVDTNG

zC6imQghYCBE4CIoPzpa4Mpi6qmpJuFnl9Gbtt1n5c
rkDluPC/sagR0RyR7i2uT/sRj9psAuXVcKRj8PZzhhVeshvrjj+plxy2KtNWjFLw4Dec4OifiLhoZd0l

bwodybTtTAb0syaWuSMeBqUObhxEuPT//84t/yQB3qxSdwpvAT7vsGN0zJD4ch9QYTsxhtmgl0s+mwtr

ONIaf7veLtwEyXFEInoMMrDGzLTWotYreIqID/5RfT
yqgx6IGaipwa4w5unfmHB643DIkxnWiwP2mrNYrbn5ea8TCSt9ysRw8tPB0dk2ZuzBacmE/SDyHakjlp

4Sogcz6cudHyEL+QtHoF3QIvZplSJqw0S858F/wl98KPA4PO+Tc0gRADxKzZvKCRzqDPYUNrhQGkFF0e

PusDxDfZrHxNAMS03OvVaKLpDIE0zS0ozwVV9/r767
mwob2Siz5miy9V0dl06Yujf3qucdFFhtPj5v+D8DE9EVcIIx2vAQlEaxA4oe98Z+3DvHw7npshhwhydj

K+mNY7AxZOe0fGwzjpV/02o7JuvFYIhXTNr8q0NCZcp16DQ9CtyqWvNjisoZoazQwA/ZpbCwpBDpOiuc

+VShkmXGro3c3g6JiNRViVQ7KS2yj5CzO2bfokMGoU
urnPnpTEN22tm5nzKgVwrs1yo7mffMgUYSnmIuHaqSGsrxK1LVMjo32it1NMyjZxU6FMjDnPXoyc0F+a

zuH9AOtnzGVXauTfbK2427r9/fwr66hgeAZnz3b70MCy7nLGva8ptkDjQdhhsxEfXkml5BBnSDX9xm4s

DtOTch65NGnFSJWRBofcce5z9vQdcAo5Kk63nqbcw9
yY/AABoCNCYERi63vCyvTpTnrzdBzJ4HdRAU2IaXqDQDUy1D0HqKiEJBKHQ3ViuDf66HN3BTh3PS4/QV

9dnciivnjsGvN77UdBHr3mrFBnWx52H2wlN/Lq+Ghk4p0k30Rer38KyroqKi+KFT+rksAgunkGNlb94m

RyU2Mdtsn3a2+FXMY/szR8NAgk/ddw2Wzl+RLCUXVx
cH3n0df0QDvQLzBHjcS2HCBlF/MnKWcJSCVwEkOuzg9LWM6VwPi99wBhikimufmcNGOzzKdmtmu92ewM

7LvivLgZ26zb30mahY7xOVDTM9uJpe3ydVvbkUc0wS74g7Vgr4WFMD8XB/UMNqC/DZpIJSRRk+iA0F29

JqZ+Khc7ho/abVSdWITMjQfAfgePe4SDpb29yENHoc
OhEy5FF+bQIvcK48bniGX8lwHe323T01LfCNB9V4W25Z6bSK/xn7cx6ccd1p8IBIIjY2i9OsU1z4Lifj

wffblY/qS+EyzNonXL3FsfTIlY0Q9bdGFs+/gjZil+Q6hQY3JX0N8jdztk3cyXk8jgJCxarhgecxAQ9w

P3bOEf/2N5+ejT47diLqAZUVlIStZn1oss/4PR5dkK
rrJJZb4u0U5QQ5ePCGB2oOS0RoD/JcupZnwsZcS6b0JNBW0+rvHcBY/2YrnI4ASkAZPijCbx8kbhcdU3

1atJ2PzT6Xtf5eVTvbH6U5lBubn25k0cat0Zmx/YOB3z+4MD8lzqVSucH/6BZnkpUOuN2rBLXYRzaBD7

+29GsFrce2QlQMVneg4MJhzSv5h9eEQhX9pye7ZF3i
WFOzsa+uXCqpRh09KXxjDwpOq96KYZvzsoQZhtUjRInZWKUOltVJ30OM4tTA7aVKwgjY2NqsWp23Hslu

AGAWt94xv8uAV67PLHq95ls10vKeJtswboFrJmt19MoWol+XH5yCWrtTM8eP3oMWQQ40e3naknkXWXu8

/t7ww7OpM9BZGS4sNyMzFz++1CBSV7a0aA6M+bJ9Gp
1iInIX0I+D8U//XNt/w/Uhx+s/mhTvpan3YQ/42ov+fVoYP2LLRF6XlnhNC2hZUgPPxuSd9IDYwMLXgf

++PZAxL3AO4ipwhaThlvFSJ2agVYvyiA3hxNzV72ctvS1yQl/lUFLDEzoLyo/ipf06bxAUmxRTed+E8W

OiGvQGVsOG2DhINKg0M6V+TGuEaha0ZaDu70+nkBk9
0mwpIXcgbdt6RHPCYvpn6oQcmY/VQOyo/TzHweAEBLxuIT9gIGno0FeQrc3wUY1dUl/81x1gZ/5hzbPH

QLDqQmZbDsvqJWS5Rj7ht6K4qGIrF8mkSklFcjco40ICma0YmEXU0C2gt5/LVsAHLAeAR3JrPmiuwLaV

l/RD4dJ5izUQk/6LP0fLP/R1XIaIdNI32udM7bCZQS
u4+U9kRTMawvFg0xaV7zFeuXS0hzT3xjDh31hyRgvdJXfZdqIEzI0zGBUYp/aNhBawSguI74sDJdO3Yp

1dyg0MajvoGsdqHUCHJgC8B8RG4oD3XJgxac9yQ4CcFiZF9nnmeuptHOMlK8jB8P6u83pVoTm0Z0d85d

9xUYbWlKJ9XYFSnOfzSl2wxU5PeqkQ/fbreXRUUIRW
bTi+TF0SdrJKfJTJlo9eJexF7VWg2LyGL8CazgIzkVUoCIhfawx5NecMOjHEbbjMgF3ihak5w5wYBSbi

mHJtQaE9ALtXC7keBz4ZzQWK10c1Y4kJ9J7ynjxcqjFdqKwddRbtQYl8SlGYkLTIRbnFUlHY8PqQPMxe

HM5Rl02FUDcK5LHMFXfjY9k0GQr1UGS2fKp9iy+lU9
sUdWlE9faKfyRgE+ig8y2ZDwi+dKvY1h1kMbEbmxLD8wWt3W4rH7ky4Gx/wGZtQp2f6FHX46qlbOISJW

ahI6H0CoWk1NENvuPuXfpcGgb2J0LsOwEpPWX/1SNKNo4n/xccd0bgXeG16GIXvD80e9IzO2peCpMzSK

k9e5P3f0h5zktB1N3eWIHiHJBwhe82eih+/vEG9KD/
7S2pNExOH9Se2ENiK4Ti4bIpa3WAWQ3WLDHizT81rJq0KKtwX+yDQU6afiXD5b4IQivh5i9Vg369J7BA

gUG7Yhxv0xsZUvCPbW2N4cO55iHxUSYyKcbMBrUOtvz9s6t3qv9SLKWNV77N+0vy16bBUV7KAy1WzXRR

HWm6yblyz15mngSm4A5BSC8SuBu5j2jSJ4VCFJzyfP
JuNdhqGVy/MVpm4vXG7rbR1k4hXh39S7nsmSBvgdrbNDMfPLtUL+TX5s8eIYhyXFCy68X+ZFoY0+nD3s

V/ljQkH8cg0BA+ykQ/KNjh4x2KM+Pw5HPa6wuAFQtmF6J+nW8i4IKZ6oE6qNgt+hR1HXa9yscpLpFdOf

caKFqUkXONUBZ4OahZBn8bNA1ofLXApV0ebMrMVkLo
HjZGyC6VHodzs+V7BKBkkbDOCCvmqt7Dd9mb+IuFdL+dnzPEoX5V6noQZQbm9DdbbA4q0Tk5nXz46Imu

eg1j2I3NBXKMnfY2a9tQTc9zN6Td0Bat92f/aACxcNTKG4tcCBiM7vLHCTi7KA1qGq5iXsmMSzt6pyl2

2T5FpPcocRkOw4X+C4q+A4dS9uPHoL1oGfMBPycp4H
Ry30n1jtLnXL/BgK2M2Plj3nKy3MM5is6FlRQcLJbEm+XwWQgebobV1zrMvCWAND+hbIxGn25Q6S/uBx

oU2PINunJTcgvEnYItwXZquOvIKZG574P4N1dcbq2KeEEySSs56JmxkpvLlOH1/SmJmlyDEz/jZbAxkA

NB1OTYMEmWLv4M7AYEG6GngAqHwDH5GdkNNjClusdm
rurTxHAt0H1jQpAJaBXbeKQvN1jVFZQlXCZfN13JbxaaqljnrkbaexUd9FYKRW3law8pmnWA55wAkZAw

IoMvZ7b9/0NWPBMDfsOMtMVsE9LjsSUK/IM1OUMEULqQs44ke6iny+ReuxWpV0nSHc82RfjZfhvfsT49

tihld1d7XQhZg76hDeRP4Y6MClQhORlrFx5OGos57b
2GiqLpdbYS0AQmeyEXodtVRcb1a0MCe/ILRmNZ4fD14PAYV3gOls6ME5lB10wA+dzSWKtuwYznGlrGQo

Oa4jJzguzCehEKNtf77jMLA+xCV7LGMN1rF5p5kE5FNzResZeZMV2P39TKOSxjo614mNyYxoBP+xHLJo

wyMZK9//iO7PrGM4AdM+X5tbMj/ruj1C8sUaH+gdsa
8Yptnt1xMaX2hxAu1tdLBNpK65EoAaq4vmAmxggUZkrvYJQPLQHeTa3kXrMtXLAC7UGbgCI9YCxm/40D

+NDMdy7FrqX9aZ9ebGDHOu9NoaPxuBO9mxvUV2BK6I82HSpqvOFA+d3eQiaEvgQSzgNGIQ03B+LzLyxt

W/IcaRGVjBV1C2UsOC5DLp5U+fLyLO1Qa3qlVg6qdc
FAERHnVqfUx/P/ZJpDd2ERjomJi9rbsTiMs6rJG/bjp7BboFfepe2gcl7bBhr7r4L2Fab3eLhh+my6fn

FzN+oQDu5urHEwiARRCDtubSPvQReMmsliZr29w2DTLWhZ+fvvR1X1Ze4RL9XOIr/qm3YUqCKm25krA9

Nlwz19z7+/LewmESjsebKqA1RSTnaNSZO/3eIbDlij
xC3qFd0MlPrVQOaEG9GdqP7RdM/yQpKzg0ij/z56zwYnQpPVj9kMwFU5uR42x3jpF214IxY2QHN5hEs5

t1Ohgh6RVu6jPyt8ITtjwhIxwuvMwIM5uA8VUndhv83/gTVCmU2/S33oF4q3yjVba7PFg4Mn2vmxaFo7

3lwbUeivbypbc9F48cgbJWslGsSB0dtCjaLFMlPpcT
E/wM6DfPvXlOYFM2cLnR/RVqbIHtUzUl6gMYxr3oTa8heA87YeM7D+IX4rwKCX2EdMmxikJTTxtz6Ct8

1gCHQ4FSKHRiRjatOm6kTUL/Xx1e8w0v16uwGa8ARYw5IAx0Y56ozRZsvdkM3NNsSFPG/gbrUZkghYDi

44/QviftnXg/PE8x0etEQMo1uAl5QTArCmvzMnKxSU
5Cqh6SOUEzhuLEYf4H8UuoO/dL1iurd0I733imi+NnEIf/ffRCWbqq+cxr1Xwqv9m+wnmLmQmrq5tcKQ

rHBRf5PyfOX2r8H19luMsjkztl5ASqtHAbTgYlJdvmroERiT+wtTAUea6jiei1LkcK8O18x6Yi3ytUM3

LSh6TJK0l/OsOnO1XQihT46WVSb/DNHlVpBN1rJ8RX
9v4re4upnFnFTczxiKdeCNLmHwwoi3pXQPrCGcqTE3ahn2LVrVUxHad3A/hAHEUpzu617JLD5eqKZWpQ

rBmbqWL/G806bjVxP1GscYjoBU0vUcy99+w+Z1qp2jvruEeVVfMRv7dcuvKevlT6JdeJlet3dPX6H3mD

rHLtqoKjX8wXXtLuYpuqVNFa8y4DoxjeyV2lzgxRsH
ZeMJfDZvyzXKa/UG6uIfYdzSX7JhapR/YtjWnr3ctCcth82w1AyD2Q9E+j7veHQdgmaWstsVX+X5naFW

FtFSiKAVOa/GSXLPglBSwaHsvY2sGPVrFDuuPSaBdSRprRBRfk6jULtl57J9WRkQg/g1MeNICkFP9fzx

iNQ8MIxSDe1I8nJ0devcR6vM/zW4MzdBjrMLrWMnL8
2i6AnqGss2ulkNvlkTaO+GBnHJDAhU501PpGZH++p75RhnJr+yP4nemC+ExOZUmAKSu4gaMwOit8AObK

ZeocdSwH5gePqDkBonzL/h6zYzC4sa/P+3EeH/Y2J5kQSoGIoZ2B0bDN8qp09C+HyK7QPvsce6xkMlIc

ndxwfzarCtmjXAZ4+ETyEYQhqjpa5GFEzznSqixjaP
0Baw78cfutyAcO0gsPWcu/95Rvc/9v+aWnJw2gtrF4FjI7nNvcA8XkeNn/vHWLyu7+utUYTatz0nUGyo

8ghm8QfwqgrIF+YpBdVODFyiy/DLjFZ8VbAfknWb3Ws1QYH8UrYUzq9hwfV1iwhBt6rqI/EiqVWH2qk3

QKA+X8qd2AR8K4/XhabOvkaw7gxvF5FOSjxxkRsR4w
I6JrIL0b19WwkksoVEam+WGE30NSgB1rJPpZFvLKOoesbW3IXkg34O3oeXOdk2v9jcMBP7F8EkDMxtk0

NGBDiwwaIN+Ga36d9s91TNbwhUN3AE2g/v4KdjNYeOSMCqe/c6I8roP3ffltX2+hXYn8NJJoBWcaqVhJ

PwQniyul3qLqRaxkZkTglo+NgHddgG0trQOwPtUX4X
oJzGOlfBapjEdIuRVfUC+oW8C6jiNFSMdSTdgt9VWzjmgmyCaJfB5huFsvw26OCv7OpPvBPlbihLgQ9m

7e1cRlHnBA4MLMnPs0NbON6pyvawpSm9IL47ePyuRk087ZHrbVeQyKrTBPP2zyx2PVSpHyLSf9YP8y1s

57NiG2SABLgfv21scJPt1LM9qtAETYQEK2IMdbxe8L
IJfTX+4SESzrYuYRCTElXusD1fjWGj3TArev41mx/hCD/1e7GGUubfi2zeY24ADphH//o9dIh6lKa23d

K7f5w9afodGBOgarZayQvmOZ7p2ABAAycBx0bYv2u+kKX6ANtqFlzHff5fZXhyttFpFrLzPgYkX0FkD5

GsC+ket0UtrL7WaN0mMuzOj/WlfInqm4NxGUyNZLUR
bbtJabvB4sRKnl/5zFs9BRPmM65WFXgtJOXDQGj6xzdAT8sTYfZcoRI7f7Jm9jIrySLpcav/bM/rWqkU

F/1Vv8FiC83+rse7udlAsFWjqWIqYfO10ZujWyHf2TdBJ0KzyFhStveHLH9tb5teXbEXHr31fIAI//ka

jzZAg6Q4cOyadUAg49O2BqsgVs2AEaZvq1S61Xxq/Y
LEakln54AB5cN3qfaXdwN3Goi3ZeqKYiIVF1v0SeKUPF4wegXfslnkVRjysj5HbNtzy56OynacWDwF8r

JVSIDG294qMwGsMIHSKK5mifIkkCki2LSryZ+3oqSyQW2XFyirMS/DxG3w2l24CfUJ8vKJCN2UrAtBwZ

KsmAfcvHH2SCTCIGgh2EoqKjAFQzQHy4ig57y2W1Ug
xcvCNRKNGslTGIyrfWp7t7T1RNdSNWGjNH9eX7cA0mShWD0cbF0SqXzopXtKtri3qBGjGKWbO1DELgg3

Z2yPPYVZUNyOTaM4matCwSVjtf76T3BQY+aCo18c4SwbOx/WUVjnVR7B+DWjwu+1qP66nkKrr4YRgHy7

g9azym3kg1AF6QwLC/kGnluk39jM7ggmqNGvV8JZEj
ycW+HgVowecrlTK77+n/HXhM04BJZpXJLqq3x8OT3Wqow6ng/bJd0ilerN1pXmK6Yn0KwujjHJagknDH

+NoMAn8nQUQPXGHX1RQAx2WulbItWLVvu/1zmuTW42GwiBfq/nmfrftqN2Fxtjwi1+UIEgJjk5Vmu3Qw

SZkZwPEiP0DRihSbcKDiAWlbUFkUo2+43BjLMk2AIl
7Uy7ACb0l2MtscvvtLaPOV1C2PcsvnBSt1TC8CpfmEuVh7aNF9M/7Fn3+HpY6nQdm1FyV4SA5TgfoT4p

nwOn4fbKJdZr8mpzaQ1zpQ6nGngIW4t+Z2L9m1/liiDLpk/WZVGtOPuSCUGPVEVxfoede/Huber/vX9oHI

Dgm0Si6iKnde2DbB06kc7hPDvpmF1VYWbLtB4K1N8v
aeXnKR6hfkuYPBqAulp9VCQpBsyHhxmUmf5zuPAkSaTanxzzfP3Dl2ftLiANZyd5zzA0dBn1BV73Scwy

D66MZOpxJRi0cRUZEqaemBC0OUYg5sLmn3X5PHml0JHpACRFDf0qoEvR/Nx8mvSs89u/NKGafXgqi9Gp

p2QMe0NZ/n2nvINX4kejA/4GUaPpjw+QYDHEn9XmwA
lbq/ARk8fFqc0HL9PHzNDenLrV74A/x9Le+TuQQYu2JqnzJIwhdRbvXJtD2/XG870sOEBFN8VQOPIYsI

sWPDMvtKtyu4+mjlfhRienHrXbySWH8/K/eLcNBM69tNLc9giiQoHS14GIDdeAHrudXL296yFHNRpWSf

vwgAT7qWC5NQrfZCPplIk0MfyUkicRI8X+hZRbgY7o
6gwsJfiB4bcqTNWfnENVP1Ywmu12f8REy5Bx5a3zj0twEiEMjEMHTEhYHdUUnvYl8JpX3WGp6356qPyI

zhFDRNbK1qXLZ5HvRBjmeNRpI2820wK8J/3gs+AR7PpFQYeR33B3FRb2EBDdoSBbvlfItqLWwePQzFuD

lWz/0k0GG3A4UwWSBb3/bd+kVNvzh+c9cMoKIPUo0z
QTDXtbExplMRNacyQ67b01+zoZHXBPjS+iF3hQy1dVpuoEDl9GH+37XkFgaP3n0Maa1YOwdoEBl4LPKu

VDFN+UToBaVPRkB915ec2TzLhzfHOgZ7e5YTeRNeeVPXkYzhJ+ZJRN2BugULeH4yyj24089+HqER50i7

BPvhPYX3VYbUEHLU+kiaoZkmS+xvRTH12S+v7Srx2J
yS+Ph/1Cj/V1EajA7b58IOaNcJ2pDXnFO/DsQz4ZHgLVVCKmnATQMAhe0icTPqFO4C1mepj/vSPQkA7a

LpSh7QrC7dnVmgfQG9Y2hPnRkIxLoSIzn2JkMfBI/3pMl34XIxb2VBGos9UqIeGKAmO/PWpTrghaYJe8

l72gB+4KfzF4/kn5Byx1PZL1UyXguLzGCDRjAfhT/1
EDhSQDSEib6N8VV5HaPxD9ZD7a2BiUBi7/Dv4gYw/A423btbheYES5gInvOYsY6bicXTRYZ0s1otTF3D

bT2kIRg5szQW1k+6xFVhIiBqocFox1Js45lgTrDnl6W8eFUUYU2QXjCD0x4r2P1k3rGPE/zv30ZApE2g

v2m301qyWjaHPesKLeKKeDaFMB5q1bTBR30IVGizjZ
HhPibIt1rf4E6jGrULghyPKM/exaOJplTxM0HvcupS9T7Z1Sk/yHffgg8CpXO+AAez8iF6ATMk0eqwNU

H4QtaeN79r3RjedL/XF/4N5YIRcTD3jVk9evyhK/DBygWgKFpWz0NE/DHInPYs4nL3cVwgd0ESfwJIv4

kJOFQ1hsEnUDR+R03rB3lIJ7w+A+N5rguys5yzooVY
JwzCL3yts3ftJAeBQq3pf9lvB5WEahxDxwcoPSrvMo3qGiIqWf46Ymd8vyfLJaLpYP4+nipkPwM7EiGj

4Urh2n8wZyx64M4xJZpfIupAEtwxTd6doJOLKBMeyZDJYztXXhivGoQUnNUNwV/3BM6SapKEIRVTFlCZ

dDZyLq9rStIdhU0m6ahw2ec0Z/YFhE8Ykn7QgV/v2q
VaL7D3PSsJz/iIJpWC/ZQUR+NhNVGmyk9c52o1wC3SIo1mXSyAeE3LqVVi4exCuI+0prWbaR9/BYpt3N

483qFPTO6fEqKYdVIRmS7Ol+DzdptTM4zcCJ8n0KDobbpEdEVG0HgZmMrVWnSr9rLfls6VA0NlmEHTt4

2ZH5LhpJeG0WfG4g35wJ0isXh9uZv2xCaDIwn5HomY
VTwjI9/UFa/77wZskoit4hkTkB7DFV+yZESTpXkbGWlRUMJsWGctZpTnJAS9/RWZfMku2q7hDQf7Sjeg

utZdNNkqs1iacX7o3sf6ytyqaYx2e+0HqF7bC3cp7C6ZwEpVMxyuEUclKWxzrqrKYMnYglTjXgadLqf0

19lg3mw+QhReEMaig5DJBAY7kXMg1HTER7RrYjRU5C
uY3NAhv2+Mgcn6tcm3EWnEuXl8ZA/cE+5DpHnQ0D01G/rePdOv8d+XdRd4T7ed53LWn0el+QouPXQ37x

EAlZDGAePe30o+XvPagzUyt1n5lnwjfBKVW0tYWK3dgi1My/FMXperzBG9bdftJV+gFr6g7VS4GPvz09

HYbpoHnwn158qVWKhr8Wn0qMjgNxFbmgGdq/Wpr79r
ngkpz2sn8WHRGG2oC7STqns8kE1tfYSiDp6C39JhHGzx9Ljdu+J3cTEVo6+0G+5aYgmeTioSmzE4Coth

ktq+M8ZrDXz49OlsbK/SWUuDSoECjx+0mm7YDZv8UAonhIsIRx6E4iWf5g+QfjrG7EStfFDJ1Lha7x3c

x2CMVe0xG92OynB+3QL5vwNeSCnX683uyl7bzcq7xN
GKLpyKISKzrMQ/9J0Pkn3ar4BYdYO6bjLYuwrzYI6uW7rxlokrOABbGcrom9vEpqmupUSHzmfsvdYbNC

9n79DeO6tJJ/UbrLyVmV//4aByxorUIAmmwr/6EZxHrzNtTvMhHz8Wk/yUASFSJeL/H1GqkDAgN0KP3I

mOLL7Mhxc+mEf3LfDNo/sAG7PSR+Tvwe06PfFzn0Sl
QHpt0kwT1BdIpilp1aoNpUjGzNcYE84v+SBV4mz0YRtKoabyAIVzUJvFuCjsCE52Z1Kowvc3UyIzqjrX

7Z0fzuMi3kM5/1ue+s12Kz3Tmdn0Vl8MH9tiXowaDtFfNKE8D/ViIu4xx1/B+HJurfrW5ouuchx0PaI7

0x3Oevf9qG9WXnrGqJJ+99hxHxBzc5rXPtv4hbkkYI
FTS0B7OmvrsNBhipSYKVDgeBfDMNYcNQPSHQI47b031b6j921B1J23GFd8xRvBCdImvlSnYVsulH169L

7lkjbj3NXjadxFikkI8Iqfgf5DhJixQt5hpiEiHvcgMAmQgbN7AcFHiyYp/ZL+/k21Jz7UQMyKM6YXDI

7H8xKC/7k38//ekn2q2NhIvIaeQaB+SiUH8Q5mhjJZ
3LIM/VS6SzznSqfYkmXwVlVcTnMAP0Bkx+UGKJBbhzTpQ/ljdkm3wIscPL84bjZ2X/cTP5SGbmWxkMHc

qvmbnExojTAAp3klLhry9zrKzftcnLw3TfLOmNjBZRwp0ByXww9+aa7rv84k/quiQmYGReInj1JieGLt

eROMc0OChYKVCcfkKw9FhXZGjGropcrMQl8IvC2wFE
YxFEV9STyt8sYtnw0tQ/6txcSHr2GYRpBWV3IPeaNfttTaN9XAuwx5Zy2TSx2F10T4UoiCwWEdcH7IEy

DAsruvrJ5IuPqsTM31SdcNFP5F8lH6gLtRYNpQebUkYH5de0WHdoDx5AZA/l0hVz4bilcNwGj3CQSqQF

wieTinjiQkCd2z2qMBplUsjdwCfmU1BZAiN0UAGKbF
1H1e/G72sdexf1yStFTYMbalmh1hopdQbKfmHTTX2OcKuhvewxAs4NAt4O4auWLVHbLIXhgYz6T2UYlI

D+1dLFmSlrc2LN512ycNNxbnXnwAAJnPcp+a2+4Kz0eG9sIkbwUe22tW9JXdRUv6RTeACB6YI/x4xrjp

ZX2bni1eWhw7PTfmlCBdNr4l1aM7nBBxwZuJFtZBMR
LseJqxwAus2iTC1sFUx2ablpyzu8NRDPVk67Ymr1GoADV/Ani6zQRQKS6ymw75wf2hAIl9tt036cp+eb

gXsrxM1sWFNanpGIAQM876TW8oGVtwB2/3W6m/pVWI0KRTCmzMWbSplJwf+sv7n3mOBHxjCslfv01Sxk

/sO5Ug2Q7Qb29Pt2Kz1y7b9dua1cPOxr/2zP0T5Dk+
9WIV4u4N4afy3nQDnfyQAPJPDhnB7CTcVHyv+oKfAOC37ZGV3UZj0gS2SEig0mAdzCBYpi2V3opIhKVn

Rq3oXHB0QwOAIQuRtE13Hv6ftr/KnuyCDzNiksT9s9XHDlV/B5pLcEsYq6PyDhdI5FWtTkngasLuTAQe

1anYedY5ynuP+AZ/7KiaIzIspb1M/c1N/TtRXW/5Kt
8RF4pyGq4RPCoQQ4HR2OvBH1GpW1H+ljJmt96TdTokq9A5aJWCz3Rr9ZjZo4UmhRtmy0i9lMUbUlTR/H

Fhj4s75u0iB8pnEwjrmpsGm6YziiaOKqWftTVY1T7ilBKxZfiJ06oWGfATXymsK1a+Ra4q+hN+3pShiq

JcVawJGPmwkRIONTLbY+5cmaxwUfQPVIx4mn5hsiei
OEb6iz5Kmi84ESOlzFUMdFwZ+9ned8/f78tPawrlXpwuQJUqV1TIVxad2QyecxIEW8aRQWRF1copClm7

Wpx4eaK924N1nyTJRYdyANw5qWaigNhEh87qKwax7b96l5vKAv1AtxYDtZqegX80lqRPCAOgQsPL6eqC

6wCTzbIY7eXVfyUOLMK2t+g2UG87Fy2r4p3B27Xum2
3n9Eol/XWePN2BIJ7yrLn94ibZdtV1iG+HDe3ASiy57kZkzTWUwjf2x2uyFxnvgS/eqcdC0spsmi/S1S

I98xWBidew1fCH5f5tE8y9mzxZiVNzYJrNzJHiHOVnjuoqmebnFcckAXd9ZdtarPfP2ZYg4VzZfxkF44

Gita+mj4Wdl3cHx4CLztoULOhJEdtJLEVr4E+n5OraH
frIcsd/WfC7036OukAlaiqLFukf4Rpc6pz6Pk51CfEUVELPNodrjK1XZvxnCAxKbeTukTmmk2vmStZ5h

ffCiCWU0QDkH9GScUDt8hxzvaxrIPOyWLmgVi1hFb3gryHozRW1P2KoXBUnVgbPvTdw5+/R03xhJ5nVz

5y4Tf/S/d+IzlFkJK2P/9DnH4DTe/KgTyf2jLFLhHe
F5XnF2ChytT6GXEdulVaVTUfvuFuR7RNv5+IUFQX3c3CYKqN0Z5mcT4yvwZTes8FL5lFT9xzNZhEWupk

ML0lHtDqqjzfigN/g9OefzkcJw9ts1LGxp8iF2ar9Qn10kwIPeBxms3LVl6qkOhaFjBV5ir5csdE7jnU

YIaD9d3hETEEqm+XxTzER/mu0yljE3Ddxj8bfJf1kJ
rEKury0Dt7s6ShbbDn91/oEuqgLfa1IfzKkH6P4LGIeJ7qTDcOnuhQQFlHU40N7UbI6t6/KQuHKH2dfZ

Wd67tv0pEdMtDg8r9qb0rmFq/Eo/hGNb2/zTkUuAnkUHZZVSudUDAEzbooCjmibtDzugoHdcwZax7m9r

9ZCbcSH1gAcjyIMuN9sXUd47OtJzz+LvS4MJGMK1h/
ppSStNID7R0ut4V31BthqxPHgnN2Rc5xyFaNLN7RjmptH9yygUbSxZTBsxCDFAkNi2eMnG1rcWXdjlNb

AJOrt4QYEXdO30F8RStZmBletzLAMySiL8oe4WWSqQz6yHECfXEQXVxjiJZtDdnD+zB3Zmv/A0P6bfNh

ytsWUTf5LZ5WMbxlz5bvZEZ/nOzly64ghjgHv76D5m
ly/k0jaDPO/zXoWtF2ErrPWKtE3UnDxm2BN9+mJewP+YKsFhYBS8SKzaoQXNPZ6Ps441Sb75sNZ2Gd3W

0hXX+mtyumcLdmLs08dR9b7KX2pP287Lw+bdpaI0Bn1KaRS/M7wf+WWU/88AnvIEq/+3MIIcRS+kaYqC

oareXRG3xhOSv/kNKq6q0lfXIMZHF8n3bWdz1fMErK
DgN0XJDExW/zz14luTgwo5tHeJG2Xkf5xwIbKAZGsKqKa8bft07zoCMnE1OMtJEpyyB6/4r5F+WIwLok

rYI3Ufk50RlexkSaf5pa/7NliIkS5cyq5q+8UF5dUPSHeDKB0ZPIkoJsSpUN4rMiu18bmULdYD6lPFZY

Qh8IafO/VpXc/NlJIbLq7H0dQwfSSHC5uQRX7k6+kU
7gCXO+oT0gVNf8DZg8JQgRxkmXeLorHcJ204SEyiarFcq39zIXIrMAdaF4lsCELuiO2AweyyJ4vuXMhP

cRnhF1aR/Y+e0v9tG+sGuZW08Bb0onP4edMXmk81dVt6cOv0oB2WrPQhH5/1ogSUUorOIfM7YOjEW7xf

ICzDz4upTqMSvYLFxBLrEZvTZd9v9cWPogyktQLzVA
zMoC2KnOyrsBm7yVuUlQumSsvX/Nu1JQRPA70adUbkbVu0ySDl9II+641POu1cdeCMRT1IlYTck/eeK6

QMYcKrk4cGARieK0gYULXBMY7SKeDV2Uw60vqE4eDO5a3JQOGoFAKMxKgIXoKhAHoMr7kktkmjUQB9Mu

a87Efv4u90L8J3bI2x531peL09JdnuC7PrzBY8bddg
9GcS1oXlEeez2PoxaWJVWVvo4yLK9ief31tA+xbHWSVNs68k6FNo1mGML/WwfXV+sNMCwxsPIlaKcOYF

U6wk23ykMoQkPnlieNlc+Sv13QEc2XczqrKcHqT/g437PYSZpt93ZfRvjAVFLT2Dwx8UGIxzfQXJvC/2

eJG/AcZ0nzZPGuxHoCVl6e36wFK1juQfRFFmgN+mX2
h4CgeMASrzlpzvg+67HNERZ1k/b4kldlV7R6yodxavwdDKEhrgwQsZ0hairtbmzlATrF6PojTUyzFb//

QyAi6dTs/bX6FDoMvq0JibJdvxeHKv9+gRKTZylOqQ5370gxnX7VnfasTBwgJGskUkc2SLKFm0G3DUIZ

bIUjMfL4PnxF7hvHpe8n02FfPWtTUtfQC0jC9xnmsz
dKgCgsFJ23vlSXVE6mIhxOA2sq6dyQcJr3PezIcqHAE6433kyfJ23vcLWRmKMrO1bDaS/rrob03OdMzd

TRlla/Mp42+pp8/o+zpfHoUILopU6mci2EWb6moZha2ym3Ni3MAwOLfcRB5WQ786zo7x36yZ2mNT9qfw

Ddrrb7rXDEHD6S/FEjAYF5wT5DVcMqr72HjD1LqyDv
Y/hsMmtI5910Hm7cOuI9xOl2k+XBaVUtOfo42gFVWu/eoh7EMLdXuGNc6hEaXSRvgZOafgBrB9Vp9mGe

0dJiU5iwrXoiCYCDKKNJ4gWxhgqWiPqtrtThTIEHv/a2Q6G1q49ADBBhGXSfpJphHvtC9dQ6XQlaMZno

l2Lsd/RwQkmGvBM6X1pTgCbB37mYBr3Til/RxGK9Y7
FlCOEQiolEIxfTHrTL04S/CnG3j/1Oa+t+W8WzspdWaf3COjPchawRV7b/srSJpVjx6yUrwMq7zbQUQy

S5ZAhHMOV/YfnzGr/xHJ6SAy/0jD+yWcOxWii2jER2cGUXemHJxY1QzpCPy12nrxklXVoFrEGV6X1yu6

37ONr9yRB4vUyi15vlQm51o5K5L5LKjmd5t6MvXcb5
DWxmkvbgaGEQQSfbyYKYXP+WhZHscL2jJDJTU3hFrspyOaj/X7Ymrtk1ZLeeOJ3vnlmQSDfYW6d0BnvD

wurEhl4QDPXUZJMph1whQpU51tC2teQ9CQUtb5TABtz+aq7x1Bp0w+Dfza1+pa+LNeM+dAExhg9mJVFR

TYl0mSUHmUpP/p5pnpE4VNi+CTLtWA0tDz1yS+0+meng
9gw8KwGoLX/Op+k+aUMbLKFg7XFcLZNT6pMWX+Q22cj2pDi/3DKbmDLfh/5C4q9SKAD3nabIEExibHGY

/iMacz0neH0PmJI4uEuleJVOTcQXMBh3Jnrd2W0cnsoBPOwh3ogGLjsyQJNpr7HzClxk8bFMcSP9bHzu

6Wv7wlNpAzj4yNnemEnw1Y8/cgB1/an90rkFFwapP+
4I0uqOuYY48TFWUhX7Rjg1c8HHIrCLakiioP839VmxquapX2eJeUsy78jJlF6rsXOHXA+TJarG3Ucekl

WEoo3ajhQhGsrcNKjUUzcRSsHNG+92NECgLHvwxs8VQCY8ZoiMm33MxXhn+eJ6Lu1Ckev5wBDLSSdPcV

E70JTt/iKdhZuQyr1RVcYavQ78nFOq0T+Sx26L9qE2
W/YyTt+voCLq/iMqapOurwfScLX3yfY/H4Mc/8a2hLeuh7lIqviMUA6AgIX4t//UWs/X5ZV/9YjgJza7

FQ3vwbCu8T63hKgCWzerVKOpqy6Xs72pQbhJtOCYXp6iEg1foutvn6x6TBMAxApcMFkcsV6O0GFY7c3U

x9ds5gTiOOQyRXEqUJxTKRIpVmtmfMHnrl3sVeVAsL
3v2iEFWPdegp+pH6CbbNRAv42bv3NZKaSK9YuidMwLldn8rMpdJmfd72I7BfEgQAp3bB6agVj0uf2u9k

JgN9fFalRgAqfpKRJ7wO1anipOfUDSn7lPqMBxflzQeve8DFRjRmgxPdMw73eTWKeiHp+7uhNlheHYxJ

HSxkzcdoQ+vvz9uQY8b/C65WPI+Kf2gIcDlmgiOkCD
2xmcSKfYZoZ36m/+oGw4yWF2eLEQAYAuvU9vFgB14hSUFH4vxGOP6AMkP+oKxMl3yDhURHI4+9LzKAJP

scMcKP4QbG48wUqmv0xe1xFkP/SoceiLlB3XSf1Tayb9JvNwHaces54u9BG/jYXJOO6bNdOi55zC7dCq

IUKYid4z3IiFLauDFQNc9gbR62vlBihaiMg1E7Llaw
HI+/RRvI/DohTl7IihU6orTSiHqb/kEF0j28Z/JXfPfsUJD2B1k8mk0W7unWYxrETd4T2ALn70Ql+l8P

JslD7MoagVzMfQCiyfbBkbpl2YbIfZbvGVbyWNvjjhLJ+tLtV+R1wd7PD57XUq43u9fd55C4/nJMGlIN

/Yi9jFGiXbqStLegrD5M7p0ooSaLMFLGj1ux9akkzo
oaYv81c4Qa6RWrz0WV36M4tb8oEPl59wmqBY3+08HjUGZaltsnxfRIwUVaAwUXC9WsS6MvBlLBs4xKcX

EA5nGpTX1on2f9Bt/01r4nhtw7gQZ5xgpHqHpbw2hU6fk6nwrz3yem6i2WuOrvDbJ8aMs8Tj/n+3v8f3

1QM7rs1Tngcb6FTx6J9ZZOonWU/GFEUz/T9r3tSaZR
SFp67Jg4jjH/SRxy348InP7vJ4SdKTVdKVMMV372y8Mw7PBlvkw4mlWNxi9e+bCbe4RZC93K80kZ6xNY

Ht8/Wi7eZrFb47YRR/noY0BqzuAdSFXsyjMT/at304a75txAzIvn7tLI9P8yOCBgTbGIqpIBrHM3aeLj

Eg9a4cHS8hTvDuAOWUEuVrOWgNOd/qoRjCBlt3+iDr
/TI2OSaiyx6AnQO3SKvEMelbaxW10APHqSnkSDCjCSgc/SeshCMoyRjrFe+rk+RvGRSGSa5icLItwSF9

1MdcbBxTbPf9Xy6YMOD/0VWUJjW9FwZJe2aBYEtgHFwU2ZvL7s4Q28aE8hFkEMvJpy0LnOKWP+v5sHFS

fTZgnSKbakZfsMDtt5CDuucbeNq8qpi6DzOPLdRebU
Vixopp43A93HBcp0d1xpLllvqsDYKSk+bQGQS36jB/FlN+2XVVjEKWkidX2psUp3gur7q5QQFd0JNFS+

ThEJXp5hgrdIEDdgvYFahqc2Dg7HXIdL3HdjHX0GuP3KmNl8UUMmPDYbTYFNoq9dLtLXHzpqyA2jz0WI

XKiLQu4s5jxQEEdA8eaSZUU2NY9+TIkySLJFkaBHwk
JrcAZXo63lAXreD/37nSxrlra2rYvr78ilngx8uZp9c9JnLmuocirmur2qo2DbW+y0s93E3U/5h/YFMG

nCizSBhLM/sEh374sUIauOCDcseSuM0hI5R+cyCiifcGlPc33Pci+1iq38juXjZlb9ybRXVkb4qoEQEd

/tR3vN5zyOBuBQqddgJU0Brc6TeVk02bOAIPs7hKuF
g9+Uk6/taVjFoz3pA/rXwvGsy7poAhy4OOer+emSaLUYPqb730rjEaLJt5HlP55OmJ0rXtumJqv02L28

tG6/G9MewrjoBUgAeWEh22lnCtoMtvYkIwFqxsVlCocntMbO7XRKIOJfOCc4mpVpNkJVI16AVpe6+hNg

7wmjKWBFVGq/O4adQ6gG6kyrxJ/bQIacnnJSIn6tPo
IZD5ERCGq7zaIUBLMFa6w2fBZJpPh3Kt6yKyKTQlwg7yg0+c0FaFsh0JLU6YSbv5PRRSWR/AJ9JKFkRj

Xldd3Hh/ViaarZQC6LrfpyvkSslp7W2h2MOlq7lIoMFsxLMUTNciqkbDnarBBuW7goOlSeydHnJYEK0C

lFm2Y1e/gv0S2Xbo4jwZXBSTLE3GAnwNlPc/NmX6hx
6YgoKAnov12lb4sN0mniPb0XMTv05gjoNqR792/1ZUvok4Nu/Lnt0tsjsn8toi9HBMXgOOU2Ygsj6omL

adUo9ppcl6OcUWZkAdN+hYbW7yZ+p0TgYe1Jn6y+pMNzp4XmOOmmbM7Z2Xfm6wqIif29J91P7nurksyA

GMuaxEBnWFsXM0uFW7bIGtX53ugaYMVrvr/BKou0yU
d4nj9fegQOlDnAagA0PzVRG5sbMkyuzgT7l6T0kzalNY6Ew09e23oUkTTj33nh7lNh31iwV9qa/8Xr75

FQCzsPOcMmfOTexWKlup+RsKK9XySJWuJigk51dSsQtKiW4spGqry43rfs3oP3TQ/c0Krg/qTzXAC+/5

Oph1B/as3mpJVWU/NZAJJVmfCePREZBreVuaPbtJha
vvGFVYdZ7O8vjRS5tiqizsywiX/lMk1o/e+CGbJfv17w4ZwmPYc3AfOo86mMLh2/LfZPRokUbv8GCAxA

48TvbQ/aEPI9UFUp1M8KOLQ/yVlF0nciww0owPxRtL9fpKpX9ZpWtSZYENfh2N0WVN+t7ndBO/7X95tb

kqVWglxcCdLO5st+OftrHE/VbU8+nqWy5P/W9XNOsO
yfKOZYf2ZY0EgAS27aHONCtFCq7Ty5oNlbVVduYbAji6DwUK4YjcIAv4V5/u6v2EIG1FVbrOljH6ZgEl

vkK0LLZQa3sqKgSC7DdwImcBs/1u4qYkuV6KQcuiy7AiuyGI2UYRVMSuF2m1v3yjKmnald6q9sPL/Iu9

NSJ8U4rqYXzC5oggXH1NBzcNWE60MRjLA0gnOn2Aoa
5aXFW8oC31lOgTP4w06bC0Qq9/nRYPUb8o5CxjURtlTd2K3lGmrh822btl651XmYJ2cKGLv5ofV32sje

XkRl3oeggARYqh2UhIYLEPkraGt5qdQKMdiUmPPRczZiG4yoTq/JCuxqrqXYPUXqlX5hH9UVkIbEbnps

/DwFhT5GjwLTjLiYSsw4tiqOh/NI5FSO7ohd/P9hh/
Uak6T1zPc/ttC7mNLqrqpg7yjKeUg50UtIImL4MwuI5tREqvQp5G3ccKED+4w3PIAm7ORiHLM2UPi/q9

ifQm1udoJor9s6b0hcHyO3u/bJESINNyKgWmLP01diX/cUkI4uLEtQJVPWZ6X2xxIKWpx6IaHlal0g2Z

n+UlbU0N5mvAfKyCxxlzIzdy/NH9yPeMh5AHkWj+5T
bUheu0zF/g3MXmbO48F7tWAyFhVG8lGzpDmTVsYtEtCZ1YB3aDxgrUT8kCklzb6YGnZ//AQpzuwewkuN

zqRxRXVQfIaPGSG5ll7poEtGUjrjlESUKBZvCN0usQlfEl+/KzvpIggMBIsxSQt+fAVmAhvizyv83cC+

cM64ufS/O9EaruQ5f9QKGAzL1hsHBT38NgHgoh/z1q
9BPs126QIiELMtVhfzPfXDeecbgy4RjUKli39X3vxWaUDdV7+Ybj+FdYfPsO5xvjjuZMkUvUerg14/om

b/db56mvVDT/oz/6D2tXVcQBZkBLn6Bd9Rc1xDZz7AfFr0PUqN5ZbwN+i4mhshORN7nS+kFJQ+zCmJGO

5V7xVem4yKKk1ScFhcYIe86nbLrK7Mm0FvCUOXDZDK
1O1zKtCHUwHxFvKhdNscsWUI4unddVxugpjMj8iXUooUR8bfsPz0RUunj3BNyEctNza1YQPHBuQVjDUb

CXCA3zX8CTa/0+23KFI+dFvDDD2S77RF8vX4rIE/p886TA2IvFMGTGHkmttJlC41fkE5pQYDlIjS/FT2

adMxEbdOFmuHkDMszrcoZ7sHwt9zWbuVHYrA/6/MqX
FljHXBrd91iYnemcwJyyB0bWu//9YnPLzQDsBPFM9Uaj5JhsbxCvoqkSQhl9BaHeHfqAk7vrN4TPryKY

HY3r3DKbd5M0LmzdJsxOCDdJCdEAXnBtIcKSZrNml1wdSDvKzdelA3W6aGK0YzUPQfBM0su3V8gP74z9

HUofIHVWA+ozW4HcOhoU9xwNRjrdiR5WIagUjdj5Yy
6sZtJZj+bp8oAfH6TlH5OgmDhySOOSIfLk+DzBeXUVjrAcbFFUlr86knjlf05iD4tfi9laZMnT569u/2

gqg76Dv+1Sdx9mhp2gWZtUiRHk70RCpdeUItZGQlP3I81awCQcd2TvCZ6yqhsW7w9T49rzZi6AnPDzne

wFPElRHqyCrVxIdOEvr+Gyv+zLfHIuWOi2pmnyPvey
vyQhXUNXuDiNWInrInF7RWXfI6Ia6QHUaf03RZaJe9ZuOdd8VENAsxmKUNA8L7px4ucmOoXFrxHycsOj

+MI/f978HwFo+GMmzss0NqY91aJVMtB5F3vStKV3WWWfjc34OD23mBTlcph64UGq3iVdalBlECg69flO

U6OvStX9rCwC+9HhwU8ByM2s+NO85OdFUtr2SFl4xs
jwlD1g6+piCKlK1iM+9SogLfJaCw8xwT3Qd18PS9tF1MuH2Zgb5DfkeCJ1TfUZn1Czx1dh4vjU4/TZcX

jEB9eRQUrurGHIqC5BNa3rBc1WuZrpWxnUw/APfoJOC3lZ9e0ijskP69FlP2pP7b/CQ0NsBsMBKAnqer

/ZU37yUckGBfaVd6MfIXyC/IazFWy5hO05cprlb8v3
O4D3t8mrFjtcUWTNatFtKWcm7c1qYdvyZY8se1hJhlU1Q2z2d1y/xVNa4XMBV8yULA1B0+4AJaZfTItA

xeQQXR/nHBNqaoaTyTiFDOVAkDqwUcadlscUeJiabbGzC2oIsxszmvDv8sAQgk05fPW6woNyG5S0ZqVo

X2J0k3PoctuHaeuIt5bevC5VdzYYcjgUBApxNgbg2o
2ybRr+WRvQdRu01VTcgPiVcB3S2LjqecS7wI92y7jlvu5hZ/ErrErMyY8GnE3mi3UbblH8KnYcJ79CLq

c/JsCKRr4cEESZ42otz6tIs88bfYAF4C0Qoc30K6Rvv1CObTDRONkHzVO8240Ef1ANbLlTuP+y6giJyl

9rROt8yF2l8nB/+vKk5UU8UbYbhKYob3hKTQR4HIHV
L8WTXM+VVh4Dj8Fn4/nWIvo6pr+204bjH0yp7v5GGn0pd5T79bsxh3yYUbp5LMkoLW8eR9XKlxDCLlDT

FCjSvRuxCWCeX2OBWX3o+QAIRgEbow4NS/XWbWeb2WgjHrDR+aOaB08hkjIKpPKhQlmj7iwjMX5VHcWy

4FLnFqAT5SdPUwbaEHn5ki+2GJl7UhhJJjFy0aK1BE
IXVWMIc97WpDvj8rflbaQO2ciV0hAhl7bwMtREQOGyfZ2oBxKntWNCed/EaBFELIpqNOdV+/aOVNpgU4

ljrac34LgvKMwLv/u+Leg5NwSBGkOPxvkQuXadGxcNWxTXF55Bk76cuB1wS3a9qlevEsTc1aLXSY7Xnt

ylmMStHX5AURcZwA396/7gSc5uuMPMdt7ouNq1X0Wb
GCWjaYk/6oEM8x2jv+qdBvAVgDfyodFs7lDxCYlz+Npe9DFseMXaVS6gkVwznDccZ+cVIjyRVihyw90r

b7sdC5qzHPxju3Pop0udCw0JRM13CUplmB/RU1f774Wfa1xqDi+JZPDfqOlfizjr/PDYIFKrp+qWun8K

8uwDGvH7s62FQvuezjPxpR6AZLt051d0b5y6QUNzLE
kbA/t7rcN30jU15nfiPQV/SQRm3mBpXj4P7O6JE/WaWBLa1v1X6l4kXdTKfT6Qt8OhEvoEwB1Y+FZWgk

+8phOBIFFlFSt3XGkNOMSZNSwQfzr/frhASrfcQyYOisp7zyaIGwW5ABar3kMmSSq4CsEC3POxYbD5Gp

to7i7DPdstFLZt4ilcDg+v6+Vwn++lkQ3WWBWPlelY
/cm2aYh1FazMgVUuA37RIMc3W3z4d3wf5xbJzY5m8SwxxAP5APCFTr2koS+Y3KRU3eXjGBtedmqKkHa0

1EwdbNh550zevCcI4V66PPeWi07X+2VnpU00JC+XHad2r/6pMIEz0UsvZTpdwFwPNj5aIRhkFqe3srz0

nAM4fK18CE7bwhaGuvWjwr8vs23KWC+/AMpAhLK/g+
CmabdsbqvAtmbLmg/TSuPxKU8Ozqikb2hYDTxMQ1S8jzyJpHkdccugwp4WDtXR79p86ZPwpm67MNc+Mg

bA+Yv5TfAFNCXJMzKVb/CRUv5PD+DfLx+8jbw/3LGXzSgRnljrNMQgF4H5Z+KXKbYhoR6060T0IxCJNW

SpuO8/VWyrP3AvzJR1vLLCGtHWZbutnSytXKHza4MS
wOgifPBV4h5W9EB23K6B8SnMK5ST+4JQVr7sI7h87QSvNxr5zKf+J1FwWSzw7lQW+7j0HiiAHjgfmk93

TrX03l4n3gORVt875x+Z1QsICFggjUwSaMsitN9huzSq0rgM/3v/T7jhIeIk536l4E873c++xL/+j+V/

yakT6aMqwTxgBsk49UGhWGKwl6mmzGcX7EVvkYn5GU
k8fAFxKExlEy0UbwFJvm2sUd5rH0GPlvV+NpgG3ljPx0aGfiaPi7ABsQOIJDi736Ihbb24hFQ6lH6o8r

Txy5SjeaXyfYWYMlukXsOfMD0dT9khhRR9T99z97nosgzTampJBKw4L+R5uU1DILPHieVRSWJ3MBQWEW

4Fnegq1Ox2nlBEKb9HUKWZ/7HXabtDiHfjtcxRs4+N
m0ETd9Ujo2iY0HeDwTkVcWI55Pqm9ZYToB925cFL0Ms9/TiWssn27U3ZsrnX/j2X5zXyKyOw9XaPgQoW

O6nCEyuNwK+khyMUWnuqhG+sk9hMDcMAqkpK6pYqDf3X+Maqnm86yKAfCgUBCPwRtTpqBbJ5fHTOXX9R

tpm2vRlpRvZ2NkDrCrX/XBmi3AH73ot3ZQFSInm4Px
WDAByiPoH0iHQpsihKLfeOtT50AXT50OIW160W8MnKVgD0pPVzBSh3/xq97qiEz6y7g8yxufaZ3z62wU

2+m26bj5Sfwcw3VD4ISl+Qtkdj6ipdujKDIBCDr9vaSQ+SH8hc7GTRV+2E+HiIvtufbclKE+MuCpwJj+

TerLNpeYxj6cXn6FksMGKbyZTqzHYP6tnFHZYbHbRW
S8xk1xW9AoabdZBOBrOmEMSQANIOFAtZr3UwlRZRMYP5rS6VEG4SwdblXBiW93OFulyBRw9L865mzL4Z

Nz4Dul5rKm7CG8Ao+chBgHJTCv52/2bFxkStnl09DNC8RhAnli3B0I2Gqbfqk10uInbpoaATlRTVRagl

/aEo8IViP+BtT7H943jkDFfyC005wOHWRMwZgp/z9z
PsvxfUbZQ+SH+uU6QwPPuMT5e0jNeQxEv+2o5cMATj4sQyLHH38+FZd24AXr6tprpzjR6EiS1W/4f2Z8

ug9UAsQKemAeLoF7QX9gDeU3X6icAICTMdKsLpSYwe0/IQI+zbmEWzpF27BdKywOeCX9BBOjslTmcGPJ

QYloru42YNk5rln6XivjKnhb8coAXJwIURfinfKeV2
8e07RktThoz6WrFxNKT3ozAW6Xhu/mIjtnh++hNl7VORFQLQJNvJ/jN7dHiW0uBSkkrhmZ97sEHKvm6T

mJH1E5Bqu/MvO3dK0kLo9EzGZdeoV4hv1CfsntnB+NK+bS8CFqSmKrVqCfFLYvXsY0Q6tIYMQPzTxrBu

cFBeuntrTq1MujRhoE7jyRJyyST7+40+gz3x5Akza6
JIN1eqoMGPqDT8WQ5Hp3RNdqPUrCWVqW5vi5/djGImCoJ1bSQa6psFzBqkrcv2xneLl/RXAwAmlc/+sD

3LYdUXnSn9zCLm7ZB5xKa3kVc7ZoUuBSJK8x6eeL/bgNreWlxwHwp9j6rEGzjlpRx+af9JG7ZB/yjf3b

ehXxuW+PHH0cBL7Dnuv6zuZFgEhvwh+v5B79e140nS
z7KobES+7Cpbax9f9yxn5+n4dk8lUWohu9zXAcsUmdsdeT9wmk2eJz5T/gAcjClx4ifksJ7cg2QG6qDo

V/hnf/NrGKzUl7pynj+qu3leY9zQfO8VsROlWZsYRINeVrSemVY8rRK7E78X4J2JRBp7SPWwQuYMGwPB

jQCyyYebrmPiHMwd/1MsgAKDLZw+GBY6QB6xl04K5J
B5H9VK2hSXtjZ1vaAuFHRRES7B+eW4zkt6oqpnY6/B7W961+e3a+F4xOFrLXwMivDGHtQ7HyrrJvURMY

kfRqtS3JVdeH2K4DbDqUGD2ZVgAiUHnvv75NRr2/7Yrlj1FkdEGp9qAs7ocDrFb4s7MWkIJZZ4ojEuhF

jgtCBhGjDqRbSyoRacghlEu/UcQkMH8GMUxxTwvb4J
YSgD0zbwgGZatS31/VdaYpS8fH4zB38VJTYmJduryJAOq76NxJDHGrv9hDi6e5OtBZU5NmC7Z1WdRQ5F

HRuvrVK39XRMu8HX4IHqy4ovZQ40sAOsykClcsQrhYhKSAafTDhceQu505g7Xxvpynov/n3nn8FVyn4t

tRKWXke5zGmxbD/JkPY9nYuJ62zy1qhuQKmzDlQ+CX
fkp/l4XiDRbEY1xsX8PAjWFSZ15zUEmvK+NshsyaA/0I5vLjMNE98fbEzXge/i03Bbp9tX02n180tQhH

V1PVp106TTlDOQ3jgdYzjNe7IHTUs07w388cXb1oqa3DqYMCTBERfTIQj1D/g6DoBAPzY6/uQ7HoeA+4

N+u8Vjrg0cegdh07+Xlo+t19yyhoq1h0NVwoBsuNpp
DbmZiM7bUcV1Zm8dxZ9MrWEewBRJlnZ5toqrrtK2Vpr2wCT01QP1UsAj3njt85MvpwUnkr5ySM/+UpTG

GU8bgpOKwAsnxLdT5pgbeKQnKeeuQrb0amkzl3azWQaCicB6MlOjW0aIGJMaVLjtQeIKb69J37UsEpXh

7R00rgFX/Dcp1VjY2ip6QlcLxfJNu2ZoGHGgh8Hjle
3gfbssrGK0ezTkWHoyodNonqypBrXuAMKL3xnZofCbmD9uyxeRnUqkUGDmaPuc8nVbe2dLyekOoz5+Tx

Qb9nNqXVdEUI8W+hVze4Xv/kSB8d7c2I41XEbzkrzoevy552eXTtGQ53+anADHb//0u31uw+82HOP4eu

odNlG7RcQ55cGMFzrO/hZuh+BoJ6PlpLMg4tffUIkE
KjqvnByDlEG19mD1Uw8FKoieLPqWUp/IMqXUnFrH/wBFlszv6P3U1B9h0jVDPP9e4t++9aSezwcDJY/1

aZBqpq2uJzKzHDT9spmtrOudL2w7fyPqpH5ctGgAHWqwiWhWyjAUXXGzsvYQuf/a74bipLtrOWpBGdA6

haY5EyYEEyW7bKqMNPOa+FLhC3yzS5V1RX0K+/kpoQ
ty7q/dIQllgH3I2IKxHnUYJTsHxlxie/16XuvL86LjAR7ZafpbzTzPxZ525UeZWdMxQv3rVbu4OjW3Z1

Hc/pVP9O73t4p8eJQ3KrTh4WKp5m35CVC/nEdME9f9sm3zbR2XSah/JeUTThNCR3ZAiq5c8gHrucCTGo

cDd0ZpCxZTM1xxttFeV23jtp048hs/TQHkAQCCgBfK
jJBEdQO+yB9Xn+Q38u9aTtNETHYtqPn+QGmQnQaGWwODP8pg2r/+o7LzaCLDTD8QOd+dd6+NHjFkBRgv

Bmke/QjSyo8SURBLLj9Ie/uv/59F6SXE/k+wexw5kl0IMglQkkyEFCBXfwEQFWl0ak6No0tq0j5ac74v

f1F9/0Bdu/ncWuRAtO0Q2gjFLo5cf3Z7yF2mNuJ04Q
Odjwr45++e+1owQxVXZw6Js0Cv6CNas28pNdcmgU+Nuh0QVnsZ9Z//4zcF4q8KxvVf43CcZkaqjzrj

EinFUw00Y7o/h6NPdCqPzY/t8B90/oTGzUbb3xX5kJW/T4iXZZZgmnHEJ7yuRGswe2YJMSHSt7DcIgFZ

QkBdVxJAuG8R7wQ04pVTgM656xeRArP5xlEpYwv7H/
kxC9mfDKO8bL921Q1UUjOnEe7EHfcEs23f8Z7PxXcFcnKIaRcqO5tnFppMrfZgAj4G8TsQ1F/mbECsI9

FtlI7xaVNkWWbS/TC5IojIa/Uxo+3kXUOOc9XImo2L/u7RdS8I38njffLap1Jzb3xt1bTvuYwij4I7Wh

yXY1QUCyfmkaP9cj8V75qBbNxRhc/45lenndojQJ8Q
DzMXX8BOAGiAOV52N/CtnZZiA34Cc37ps+XFr5Kf1jyJjBS2/k4BZQrqrndjdaCUaxC2po+XDxoUKLKT

T6DnP4op/N9DOd1nidq6y7rrynA6KT3FnozfG9o/lLWeYp6tUOg4NzgvLZRm4n+HNKH4mfoz3TzDyty3

vHVfio3/tWP9agzYeHh9YCsUdGbXUuhu4HmrhrdP8L
bh+YoOiyk4Ap9dHz3vVEomBnRkkiRR7Bvi+ucBmhEexh3zFea5CN46sTQcNkf1zwFTBg6c4BXboL282l

Xmtu3jHFgkzBErnrOjjrkeTqjaSgyBbmkBBQmfSZs081rmyjz+maE5p3ACl/7sQ6LUunIZxmB33Jt8td

S3Z/z90ul++ZfoMgG7KNWaoeygDvTLc/8dsE1dlG8l
+wk74yU0pSQUKi7TxHpzC5x7xrj408APq8qpz1ehb1rEWk7iZ6zkepaI3ySL/v+fLtALu6P0e8FuRL+G

8wPSo54jiYB3KmUW+GXNXO0vu1dIJ0MVojEU3lIRl7TUYPchKqx3Srt9I8McNQkqU1NbllNW7QFWhW0V

nPsxA1axAzET3rm2oEvoaIDcJ+BOawsEdjzd6ca6Cv
nm6xmVPSxqurSXvutzHia6Lj9qtlVss56gMnm25aaU8WaM6gPV2Yatq8//sP5NCALomTwaPWkbU/lErU

hs4nuN6vETJuK6OXhADZPXID+Ckt4AF4+DUxfm0aRa6a3BXt2DIGCNLNFlxstdlxG1dwz+I2S104SM0Q

aceQauRu+0PFg9z/0ZqHjPiXt0g9AFn+j1zpHYuzhz
v6AdrBAohr/h2d+GXVtfULC9GyPDALWU0Fm74G57eLqefqycPPYwwatTLE5d4sF47TH1c8kfeuzWogIl

7LlKoOKYto1HQUHf+9GCVqLV5pFVw+/yUpLDeTO1DLccsRSExwKkOnohSL4K3VKJBHEW3KZph2spUIia

XVpFUTw4XE3lfJLvDXFMDBtMFyEGoGhV6pDpGorNZk
eU+5T2KHtGbR8p/V6yJ39LNHCp4GNyABcmjGKoQkZBByoIfPZHPTxg49akNhKu42/58xuVtgXKP9aJQM

uL8Wg9wyBsfCmbOQcv6PtDsflzv+16AdZLtLSV+jalyaHTofdXwNeLCNNOXGF+LzHy3kDa5sObtiyYZg

gvZ1JXvH3mR7lPp+RwQcFhRCGuFLMWDRU+ZUC7wz6M
AuV0mjEDdSb0eEk6YIVauxwztanejEku9TRGg4w0pyw5qmaMMcjVY3Cgfks45jeqXdEHS84gPhHRBtx1

hl1eLfJrxMngjSsvMUSrn2Uf7qu26pmMnoisDFkXveeLgirpqrfJjSrrjJtd3E0O2zIQZ1SDyuOOwRHD

Xx7s1nxb6vhMbpPA1J2r3KN8qp1RjNO8qiHaKArMnP
deut9+sTExSQ23yLHfKF9gXJMwdgZpefhYD85iveZSMJLjZkW6G5Tdthjse9w93EUOUmZNrjOWutWO7e

kyp8suYwEe/aS2QV0CfgtMsYA70obvEBQZIDlC0DQTy7erFBjSZfqjBLqpTrZ0jVyvrIUkys/dnPnERf

Vxm6zkyXRXiK4vAVwt9jfMsx/muuiDS8L3i9H9fAgw
fTD8rKtNyKVD7GVkIaX55oo8rdw1CIcxxBrq9Bc13LhoZJFa8fszqcTki8BXRllBDL5AcbOXTeUNJi2v

R87VaRmlbuCdZ+iE+abelZM9412Jlb6kVSdBWfPKdCq7d/0aKRwbxTe/u4cu1Bd+XnMNThStHOxBREIW

D/7jf5pF511OhiAdZH39fpmkpSMvzxI5kdZwZHt0ct
0qcQKieaqALMw1L60879Lt5r6C7Iel0PmYWrYA3i5iDJxASMrm1kotnuvhdK66LG81M/IjIfoK3R/H/w

8cii2FnrYslW7N8W1ADkqPT69xlVkKbfnfTa+ZBFkh62rVGLentgXhkrq/LyLB4VfyJFO4c+azLTzV4D

uMPRlfuU9OBR4oztR9l/U19uBRy9f0R6nt9+luG7rP
1l72HpbchvE6xt7KKpDe2AQyWa5j2vznGIgaOcua8efX++bXC6h55UKH//zj2ZypNJ0m5XKnTRGZoF1O

Ge+K1agTUUN3zVvEMmhFP4B7wREvc2P9GY2saNiMlT4XHaEfi21CwaJOlsueh3nEbG4cd1ZNYtMxYgTC

gLXX2Y6MZtsENAZ3MMAvEJ1RRgD7e+pH9q5Q8keuPR
YP7vF5cFUwNQY3Kkx/doyhlJRd1LlsioWg7pe3gwgIP4MvgC8LsjMLLL2CZe9fZ6p9WVuuldpeHoEhIL

HmvMFm28Hbc+eHwWhnw2m0pyl2GVZNqCYnUhnpt3ISeDeGMfvD0ylRejlN4cfd2HFN7T/W9Q/5E+jgfu

lxujrXHWZXvNqpREgq8NVfbz66/A+XoX9SIPMIkRVQ
gXT0YIxXXk3KkWctdDAU4XCO17mI2mE+FQ2jrvY8ospj8b8l/RZwDxT+3ToQW7i5pacdmCCu3mKFsSSa

HD1ScIBy+EPMf0FPLMxoRLhy0zJvO60o52ToHX5+lrOu7KTgPhszL/F7fypSyScLQjghqRjets+g4j9g

5MMu61d5tGHAYO+lxHXmbFxgksKCBSQR1JpqA8nuc4
clUcFGm6eW/mW3fwW+Mf20an/Y4PQ8rW8HgqjqrMze399vqtIO/Xho48ibiXXn8+s/HoAWJJ1YXwS3tM

8iwxMcDZxm3j8Lgd7oGfEFvAeCeGa+7WzrcvqzS++pTYlQ+fw2FZRzkM8axToh/r4/TwEZQJu4P/HB6C

PUucXLwKFxfml+W+HSnrClETMY7/JAl5/IpkEfO0s3
tarjF6guc2vbu0FQ6IqskJYZTUI+KRxKbqXloTmXejHFaMLBhju3tYKFJ9o/xvBisIkSIkWvTur4x/06

J9FO54XjvugrGRHBpmphYJl5CtORlczMQ/DP1WLujipiwiopDH2O+INSl3gI/5vzNcwOZJGvo53iCq+4

xZIBjv+3nwjTfN279N//ep4oOWd5dvuXkW2trHQ+Bz
aoJ7dmbp5vs1W3lOdQC7EvOZi562WgpqX5aT5GSye8ZshzNuj+EopjIPlYMsco70PtZy9Bz/l+Jludy2

UE/9OOUYKSOywe8zBCc7+JVl2QMBX7YMFVroYP9oL99fsxpCNYQ35ys8TtTt9VLCmTHjezAS0jlKC6rC

46RdZTQ2SAH1Xe/WA/a+pYW20Ei3s/TrGNP6S1wRW8
ogKvkTQCuev0hkjmEW0r7D3nLFq11zS+RM3IBWyMvOSFSHgXBiiTrrHq+OxPnDy+lVv9smGZmx23ZQxJ

01psvLv+0lxtVBg0dxMzGE3HZsicjMddI0NDK0y/dJTWfAh4zzqstOnGM988thNfNWKCNEaXwdYveF2X

xIjQE7I2ZyefrgP9P5aRlkyvSWHnFePw/DvFuOF0yz
X07dhvHOvu8U6tx9h49v7AWv0OZfJnEFVnrbBxcneUccjou8XrzCVHlK+u9l2BRmOk/VqmoLMkGHP/dr

TO9lqTc+O/trs9IS6GYgNdurZrSa5Jp4SAmWIVJlC8T0Ovz7jVGi9SbDUFIe8EgtrtMC/RTlCLmqqEEc

nmOqsSOi/aErlua8rwBvqVuPknyMfQ7EuB6bjsIhZz
fxmwHuLeSGrTNGho7fyjMhmgTId5uSsZJ5VCRNdG/3dqY7+5rGkSohEoYz9Cgv57NYzMDVVOTO59R6Zw

BKYzOTW4OgxD9oJM/v/efQc+gWuxudck2zXyhI50z5FNuBeBLymeMbcSZl39bt9TCwMMXJUxik2wcCf5

NjBwdo+b7w34thOkLgNiZL++RIpD/BhcVdO0jhncCO
LIPbg+WWg7TpfJm2zfwGsnTuvDsYhy2VhAHdvDkTCmBvJD+0cCSxEHJNKYFcugjgx5+Gelm+r8Wxn0IL

pa6mun63bm1nyDWSpPJ/Ge/1VeiBOkoUf7RXT3V+EafNRy5mgSVR2YXo8Vua9iUgVGUj8zGaw7sOM12t

JRRCXIyxNlm8xxWp/slQ8OvEKA4MY9367QHXoAIMAu
0XdjYZWZoykj4rPJ2oa2Bdta5Dig4yDLgeHS998FpZ9rX3LKXwnWdB2BEX6ec5K2Yj6Sjx3bJMR2fzc2

KqgdhX+t9LpojqSiGFlwe5+pwn5ykOVHmSfaF92Itt9mqMFGpYuOsLniIFpeV24tdCXNAgRzK57g1tkO

kppqn4yBoLSl0FbDnLW60MYVVLq1bBdmG7XTiUmY9e
9QaBnWKAxqvkYJ/vroBPkUj57EUMqQKrbWU0ztMMG+97yQSFR5sYKXb5+sRxfn7iNtTDJ7bSK0wCd3iZ

YG6ng67DT4veF5TaQiM2xlDq2dHsuIGsNp7+r5NsFsxWjqVqW9kpQhxLCP2ZuOzylCdmgkZm7HuPcMF9

hbJ7lBoNdIoJ4sYsqt3UR2DRRvkMqsRFiZc1A+IBKe
BSztUI5zJHKayhuX8BtviA1cZgbCZ+irGasA89FrkxtcUOHld8lj2ElHVxBmWsM8DHLERt5ZNW3Pzz5P

oJHR3arsIDAU5YYanHtL4Q4ySPbNz+/3k6gT33E+9FlSh2GRzcjZhAL/9t4hyVAiLhJl1Zc/guJ9yMvF

+Cz3k/e7acQBGgn4TviNhQ2op2XvKqjRo6iK4bgINL
9dp2JI3SA6mJ8weXthKLnR4Ty8mrpgjJwTlh/Zpt4E3Bx0nwosElDXL73vnsnzNe5xui3N/Uda+yFdjc

mAxN0NVfTnNuZg8fhaXMSvQpeH4D4aWDdpMQGeYYkGI06KW0yRUGdvbSO3K+ufE45s6ba3KagIuDvQR0

WDiwW5v5muVCBeD12tNZyxUD/QVbqjl1TpTjs215vN
o1zJVWJFovMhdmjtXRA/bUegDtAU+cjdDm7o90CRofnmuHrgA9XQvWLrygYrQae7UTl3ollXD9zujOJ/

To+/cz+oZNNZFLQq+Ahfp0jE38ny3KtBNI5rSwFAhCHIlgezGRoRSc0dIsQu3zPMTxP87CSOtF4JzWVs

GeHjF0z4ar2DstXXiCPUfv7KfcJYO+c2n2THEUozlo
x00BeYYpx6cWo/R/7rGGibQO6GFygknU57kypSdvYZ/rxzyJOIqUxUwwLOf3hoReTR83ZqC03J553TsG

8fxbi5r+SPRkKPRYWten3bOhyqGfapZwP/E6LRCYAMQ6/K/JcWXVongOFTzuWmvqE1MlnFC6cbQDVo6v

TJ81F2jzq5VAIwjNAWnkjyqnKc5rOEIt/rS+xrEjzG
jcxuUb5msK8fHgpf6qh9Bsomvf9L1W9O3+7jPAv+BbgUm8S4/8lC27hH+273UncBSpDHrUTg0uRZwi8p

qzYtaPgJdbpNXN5nop/AC0ME5dNoyYKLOl9J23VGhLYyWbTrOyDMUd2YLV9WndcFSkA6xTno6E6UQLM+

Ikkr3iXt8ynmHxjMZrqkIVpE3fT/6kLCAVJ0UsbMxA
D/WgSjvbzYr5wW9fWhqBtv+gFFAi3lNcrqGxI3snUmH4tUQsQPd/Fqv77WgI0NVT2GhZWJijQ0Y2PTt8

Z7dgMOewzIjnrHxD69dr6bTKh0c9U7WdQBnkMMW0p87Alhe4Bh1ha2/TCzBtZqMb2drQ79KWBrKkD0iA

emHYsrk8lywtcsOStcz3XQbT220mzw+I6mMujHzlYs
M/zbZMxScJIsKRAzEVQhK+UMrFM53+HCvnuxsxnKzOpMQ+miFON4Pi6tp2PQKqsZ3A5KMe6ubvXUYqcq

4KYJt6dqedHbDCOEAm+GOCxRtWqiuxpKQnxgYZ5ZchVdHQlP+xYM08qXyLMDiM/RRfAkrl+KN5+m+Nn9

oM7UBDuAhW54+q3YxHjdT0AM7VxX6kgUmUIYRXklMp
GaxR0wkSokORmYA5skX3Suos1Zk08hk9JfwkVJNOy1aFOYvjIHoZDvzJmybURE1+MqGH/t1aN7nHmGD7

Yos57XJKfIs4xdivXz8SLgTH8wI/kCe4Ai4D+HcXhr/zQea/o1Cg6LXdcYmjzTH8eOp69n0F45zdGrCN

xsspaTk6/2kH9Kc8rGjSSEEYw52mdsraG1fb8+81IM
Wl/3qjuy4p4X4qIFUIPGYPAkV03CtV/esESwCNGOulkqoxYRY/aLRdd51NQ98lme7AtorJwbQNRcLk0e

sGQO+yxfs5gGGME+5W1DahPLJWGHYawqEYxOLhHen1PH9JIhmFL5bknFHwn7oWR1LjZ9wLm0ltuEgz8A

oaQJeqLrZOK2axMM5M92i1Jq5wi3jO2EMaCE2HXwzK
fpiQrs5RqZ3criPiNn/1N673x+Luoy8+NqOHPfs65OKNkcbOXt/8kiBkziq4S4/Rdar913HYX3KV4cmR

hsdZcmxnHk1EcVyXuhOpWsgVVfKJwf24Y3nGGcK0vXL1OrkPwQ11i9QG/ro+2UxNoD73YjuWO50wXrUa

ZNB1xTkjHOZUULzy4g5PJBPqLW8yvJ2xhJq5jJS7K5
Qtuf3bvqioa7YKwjtZlAGg2LoZb2w2LWu/HpiF58UNEa2OK5PUVoCC8V9zq5yk0Fn6MkFqxvtH+G2onk

au8k4PIHfV637MOYg7WXIALbFCWjTkXPf2hthvf7Osam3d/MstUiZpDinp/tqGn3CLOk6lp0y057TuFO

dsIc3lc3TefEJkAb2sdoASTkF23WVReVf0yX0y3S53
78itMiU5RG0oozIR8igL7WK2hyThB/bBiQ4cveLPS9QkTMl68sqFp3mAa7ur0fB+0TN5szXqv5a4/DSd

Y/QwLKhO+5I4f2s8ynYmLk9PwBjQLq52Y4jJ1DzoJLKAOvyHtlAD6NgFfkEqn/nVR59YAYJZxNakIt+M

EgIh50d321+D/e/2v034iNaYAeX3fvof7YfISRrprU
yckogawcXlTt0GEA/Anx/A290RdWXDBxjSaN7e539oX2DTP7yXjkzn6E96c7xVUCnqle5xp5ez9EqYZW

+gzhSIITyxwZ6/YpIGKn/Etz7sHgoAZZXqbb2caEUK6e2q+XbyAhiFkq+K5XNZ7sz5u1LXAcnvf47PRf

UuIelkmbfEWn1VwF7KuD6SUYJzO6RauRR3SM6EPgQJ
qI/ykn2JxkwWKLZC5O6wFyjTYNJhRgMZlyrvyGl77ssNkkUTZQLz8VJ6E5nELIhEGbj62MOcnbTBCDdm

daBzhmSc8du5/EnyEU4uobAmaj0DNm3scChkYgc7ntzy2C/la7P/ZZfGoNxvyf1ZM1lBgmOEduZ+395t

k4VZlG8KBRm+ZXYXc+uD47zy3+vcDAP0aGA7KfKQVD
D8UExLlCunvw66B3d5RDqryX/F/CB15F7/srxE6Pujd/4Af02kioYKJlKMC8LwDI+d+vmgj8I7gc9m+n

YTECP/MPPI4jYPagze+A5wpG9Y0AIj0JHw6tEGG63fTXMOQR8h+h60FxqhDhlslvYr82HW7bZasW0Fkw

4RhcL8O6jt8wqz5awOkX+UUH5ZK+4aYGdkvNiPfBBU
RfR6yy8YIi28CoEox9ogCvdEYehUF4f8JB7+PG/QQyWc20DNxby1cxw4uvHgAb2rVY2E+7kdOwHx94oZ

4p47T7KXTumarODh76MmY0GRRnObbzFV1SuVZuK7Sm5x+k14RDkAhxUOBopKf4HoEqikPTbdu1yAqfPk

ldXL7yszjfDmoHj4dnIuL5nLIfpoUEuhcvZMgcxCP6
a5DFkMmyQ2dsFNa2UC3xA5l0oCTqqyOmoI7XirKv0ZEJYTEmvAYUfOQJB0edxw75tU5trgMWdt94NEBg

TXhfFcLxRH6waiapT9BrFUc7zLxloeKPBJT1kg7F+3Vtk+e5OiV5/oHpNM1SVMuHCp1L271dK9W10Dto

vNHQVf375NsLFZmGpq7uffvnigjn56hJmiQNq11aMA
lfWOG9TKPR/P1/3JF/OL+fUvUY+I93G8E+UNGUCNn3W7TxdZX7EcgXaSN2XnzoMhYSBEcI9bnByvVUE1

7nUdz9YFZn4gK4NSxw7SIDi2PCEnglVu57kz0ewz/csr5uMbqV9MnpO6mhb/TDTlCah6ccbnhpoJflWD

XQJTd/LC0vz7uKSfzz3fLZb8OUcE5iuZE+UYOeI9uv
x1trfi+gcZmSdSMisZGiNI81D89oB4NDbTcUoIrDzonMEclOLdgTk9wHx+srawvIN2klzZoG832Eg6qV

xJ68z+59/QjgjWoBXf8TYjEg7t00Io7kwFtXFLghhthL10Ks1gif0LXxoo/shiUWTgBaipYClIIDtVyZ

xiiWpHiB8pEo2Ba/ikf/Ommkkpr0pnfwNqv6zqL2Nt
FknYCYzSytIl9ax2tgCp9nFqhX+I/VuwFYCosZp2h2eXi/2y+/9pQ63/F4/3Km7JdNi2S+wCiVd/ulOb

msbq53lngN+yT0UEz4iAaIVmHRWU5CMW+zEiK/RFAjyJf4YNXSHYEu6Og+Tby/Uta2DEo5g9C1dTmF02

bX1RdOi2cPe4BG/w3lkKvUjhNCN1HjlumGotcQ5rpx
2SNpyEYmAMfuj7Ol8efSjao5y/99TMsGNyaKtxCvKdjuFGX1bIauOsFtj7tKYDoIfJ1uQowqfTpecEcF

orNRl/e86OSsSgDTbZcWGAY84jkFPN0nUda+CwgoUSehsQpe4bXs4i3OCyPXPASZcur5P0VvQKG3Ntik

rVhW6R19rfyNFWCPL+wBVU0xX4ElwUD19IHkD4ypR9
QPMrg9OBTXMaJPlBn59PnncBe9TnjKSYUZU3YL9plMw4ROvph+H1RFqh1h7k7alhyMhcoJQOr7PtsTSp

NmqWhmeTRargSHrmCwex/ccJ9Bl4HwT+zl2hbOW4v+Vbq1+CIV9G16IE3gxE1NHyApUSlMxsn6SONMiO

tK3Ch4l66F357PETcZcoYw8HEFU6zHwP/9dr2XDSXd
clVFq8wT7oHllmDXOGn9JuFq0L6paclYr6kGqmNvbh1Gjbz+vzjFC9J4E4LWyrvvpIGr0Git0pJa9TCh

2t9SKv/X4+Q/e62OycLAVTjNgCeNQKZA2jniS1CLUCDNQJeHtYsWSWSQc78EfJ13riIB2ucyND4NtB89

UtY/4dp67b8+Q57wjyuktslzSszZKhsu4hN4wo/FzA
ArjAvDalC09vGzgE3c6hwBzrZTmDBNdlsBU+hhx0X3NqWnZotOjUCyh8Sji/b06QGV14rQvIvCE+7XOd

lwiDY6+a2idI1nJ5RV9CmIAcVrmEyi9fRP6x4YijWSVT+7u0H6HFV3OENffBybjRviE+/8Ng6Cxu1+wB

Ld/iA/nbJ6MkwICkdm0J47snv54bFMDaTI2PgCE6oA
fr+zqpGbcCXCZfstq9JA00H9I8JY5jNG1h0zkk46uWLpIY9oQHF8YJQzCAIbX4g2hu5p0C2pe4RfhTrs

pNa+flA2+hYhWSVOYh9FnoZy6uRxojOC1gKmFJhRSPyXeq1gz3ltLlbDmbS8C95vxUBc00hZpsmUzlHv

sTYgnZ2ewUa2+t+VYmf2P20XFACj4tMBQX0L2yzA2n
gkTWfoRzPA/PTSrivnoP+At05AW+YLJB+HPaNZdH+8KF9MliGnrhwgCe2OqOpHGh3/8qdsjo5BJVvx2I

l8mbSpyRe/X8wMP0VC/YOIEyRYoDbrZT7SZeHXV+2mBPCJvzvLd462ElGOcf9GLRdcz5NLZzYpHkeS+x

xR2SzuC4Fq/wEybYNiUI+U14DFu4c8typqrYAJAvke
00eEdSj/XeqnptSl8con/ou41CWkzqtvaNFD6P4Sw3mZg7KWmL/jtVcmg5DnHQsDZidDvR0gDxxgCG2+

Pp7AD+HpzHQHSrV+0eV6mHavGMJKGQ4cLECpEkOcwebY214h7qcJmEor/riy2o2JjMFcZ1F8c3GkN9LA

Gt6n0PekMoNfx7S2GxHecL3UbrdNJR3KaYXl87rn+z
LSqgFOhv9oPxZ5HaNL9a7auvovffRXAYO49MeZ094zcU2Lat8EsK0umjmZxNE7aLHIUqEdtFegmg1L1l

gwbxBxleSIf5ZxZcVSEEbEB7bqZTq4vre5MHkR72LbpILzunR88OM7AOMs0ob3qjfU1hAtOOYUWsC+Hq

+raBsa2hhn9noYTzx0MkId+6zERi99RdekSvKTBSY3
xdhd9MFtV0G7LTMthNLvTNSWEiwv7Zef+q6wAmoiDm23N4ZGRdfOD/qhenXFMcC1U60YaxMJT+U+Lw2b

Ja8MXpUNJi+LdUX3fFNo3+WmR6Xmg8KWh0JyJCIAyWWl38KkK09o8r1WICTB7IDxkZI8NOid3qc+9E1f

b425L/ZRd+PppqpVkyaDsjpamqkevzFzsds7bccCLu
9LlXYrZi5Ze0MpVb0nfuHa1NNx+gFZzbgi0XSVogtbpwu9gYxnOg/M7rycWDKRQ/648D6ZMMpQOOEW1T

fTa9QGHGw1rLj9QiYoYIEsWZAr5LHrM/1Ki/J7WMxQRVZbwRb8QJIMxO/MALnS3/E5R12h18dNti+qtY

NKIIUtSsc6jEH5m3ABygRw1Jwj/m9GsNqCMbb8oiLH
+7T/cK21rVHu2hCHsY0ZcFy4g2GQVy6iT1+8JxG4t3qcD1F0UbZA/jMay1XoYNtNAoVO6E5d9uZt1ERt

+8qiHCG+xVLtqMaNlu/qYFb8y51eWDzR3ZiFj+Mz698kalOWRNpjWvO0/OextjL4XSmwNnGwuhBGL4C/

cd5+ej1ZiKMX5IN4sc3Vw93TB78wTjFgnhEVpbhkgK
F5Iv5QE4Q1WsQxin0ykRvsMhLbBWYg776lJefUK0E/TXUnK6ChDgpLMbybH3K5JVIDjg/FBKD4wx2DhQ

cO3AXlYIolLpr7jcQNzjc5U/sl4LrIlj6uoHv5pZr7Da8NPBF7ygESfEmLvJ1GqrAU5FNei2/aV52wes

BjKVheOPfqa6q7w0YUB/3pAwYCj000rGnCij8oEzeJ
N/uL7R92W+cYJ//XlHTUOuo5MoLKRLQDaDgniil6XgjUFW0YROXi4JcLlcej7sVaWgThvey6bF0LH7S+

201CO8afE84Iw8XSjk22exr9nL54l1H21Xrl30Y13vLr6KFEDOJKAcppR/c3ZROhxcWnBmTRb2l/8aoH

4y/ySvVKhhW5GtfOeKdYtlIW93G3qK5tT0OeVM7V03
yQOz46XMsfSnaeuUaN/oTuP44acDw9U7vM41JOtbGlTECh0MqxrnxJpzVve71/+uqJag+d7OWWoEPR71

34rT7piMcUiR2YulZDNnsMkN6VNsy2K9tDOHuS9085loQgIBVt45ru5pQK1rVOz5/EElPOMOMeu+Ep2p

F5Z0h8xZjVPMaxpE9hLDW1cp2n6ON5qzNBGWlaw1dA
6y5TjcpTUY3N96lvz+Y7r+4F9UTiB+lxp5aPfwRmc6GIbxypz4yunt3S4UFmRG+R3NhrS2YtZNVNiTT/

9tPvkfqNk68/rupWoSPA2fJPz2nc62NBtbMjhSUOsZvvi51fZ7LwFgvM7gWQltir3bvdFftqsXpFI2w0

ZtXTEjOBGfEfNnuzqNjdQFQTPh/aScJ2M+x+B9Onei
zmXbfd4VE+VX3n6Ay1RW8KITty8sebAqyTGAD5A26LHdhJCoSNendJk9XZAZ2UkUVd/Asc464coABBbZ

839cf2YQGe/lNcH41XrOFEIn40PC7gCDrcWeOCJ5cmATTL/x5bnkxPWZzau3noqWsZn8Uprpf9RRDc74

Umj7ECJn9RDQ/rskno5Laoe2Ca5dSCOwf9fUb8ok3G
21CNOLrcp6T34ZftpjNRIf/bgpQw/LztCyU3c3AHhhNF3y93jIDDPIJhcJP98RrIVZWLYHMBhiwgFNC6

c1HYp5q8XDqpqAP8f0UzsbABJ+ScQn049nnNo+/k8AgCX06cDcGhXcwj73enJvksWp9Ys/0xO78Bkk6v

HMiwAMEKTtILodZ3gTJ4vh3uvDUhg7dwRj71FcboOX
q6NVavh9G6V/HLvuwLNDOxnVgjzVaJjT61+TmERcusB6gpT6DLOuAIbGZxKRlFvxM7hpIc6KlFntcLbc

k5J2CqWkinML+fqgA3gPRJamHTQvQyfft3ef9n93ghUbVfj8JM6hEdtWLjbKbN2D0QA7vN2mtDqIzWbV

x1RGqwbri8aQDBAHoCT9tiBKdHukOO+RsoI3ValJRz
hTaNczj66zCTQVWoKrxcbOefySczuQuHd6EUArJgSVEV72KOHYwQGzv2a6ah/qpuWu7C4tl055iCsGLl

/UqPYZ6x0WU7yrz9c+ZlDyncLTwn/Ty6MFv51TmDwwS0gJEUtUAvqaoro7lndpj9K1+NxLyw3MF4ujBJ

JrQErEqgBmq/Ojqobf24N9hYfg9taQ1TOIFr9oxKc8
7hCxOAToukpGNe6kdOdcn+dV+zaeHzYST3XMFk2zNGJAJ6o+gyvdEP06/bA2iJqLTw9gmHjKrf+RPyHu

Q3GHhwWUTgr+e91d87s1kf6IkstR9HHMI6QYWOUyW0PW8BuJr4hY8fZ2H4cMUof4KdItFFJ4qAQoYRiZ

/ybRr3e+UpGfADw9Ef89xzoPq3qwjnjKI/NzP1P/15
oVIuwFfiv0O57k99+bUQok1nClQfsA3BiJ6QEKvB/zlG/SH5vLuky/Si16qXXTLcKWbCeEH8wA5WNCNP

diQsMd3eqRc86Te3KlvcE245nJR/6ZF+NG2zQ3dMnT+41+W6WG9bWVI0vPDTT1q7aYkDPgtLDmrytU8Q

SX74rCFxzH+ozucpE+mPyawI6KT4Yas1sAl1pG3bK4
SDf8cXduNh2koyJaqtUz2HEPNXHc2fLk57cjtaiDGMg0Xl176Siii5Ui/c5JJov2Nq2o2Z42yT/UQDbB

EcXjuGjxklFtip50Q7lBIeb6Z8gpMfhr1H+5sTYn2ZuJ10tp3E927r0GukNWKnZv3j+x+B71NgpPDdG6

Bx6bkbLSQkRuWZKydHN/qiheec8KLCg4iVeHgWF1av
RTU1B2ii16OtHYKfrLi43vRMXg0uUgTe1Zf+f97NwsayqZQ7kCf7QPgn60vNx82f33QgWuQ6q60m4G8P

2T2qOb8y7r4Kuh0jxQ2jkk8kunC0ciUo2bLudDPgiIDBYUz92CMJWBpcWkjRTFsqvp4suaUekAlCJaUW

TDJqD3JbbQa/BOHsWJppoejbXPABWHkjKY35ut56Um
yRacncbKXh88YHCWr3t9NGOJtqVV9jUn7MbHxWkDWIiqu3W0wAfmRpnvGzbth4tsmgWJELyUK5cW8qLO

CPbKzbc3uFXrfNQZiivO+GGiJo49KvV2ti/GrAjE5JCkY2EFP9nk7698SbpWux6SLNjR8vxIwkZ/cHUb

YD/wxz3lt8hIfsTl7+AvoISCnXPJJbXPLbe5VDj4lz
hXSyaBrTgiuUIlwHovnv6dTNYcKnXWrpxkYp8tU3pfid9221F2taxi8n7N1az7MXYWWP2o2iTeZ9zB9D

2f0AJ4Ob5SPXVyXULMig4c/iP7CuAIqHpy3KzhqJUxKRgy9LNElY6XxK8ehuwdtjXURGmRNe0UCbyew/

HmV8Ot3xbcc2ceX8HwCRg5xhIKriGBPZuup5ypPLuU
Yn+EJM5e2tdh7VkBlU+toO7K5YTyYuBy2pEUfFO+vAbIVClJ/oRcbDVDMDaQsOSWaTAacKMIAvp0HXNk

mPGRVin+ky2Bdqans6rlqHOeizhZ2LAKr6F55QpdyF/+SJumQwr4eXbZ5Ov7n4RoOYRshia/PKBIUqVW

5EBGHlOuT02bNl4oMgRBAgzzdV6dUms+SND2i8Lw7n
O+elayxsQJjPk/fxsYN4Nc92f2wPwpVWc3F5hjYf23/wYy9GkAARYCd+QY2hkL1AuhhSS+/7Ruuv3m7u

dqDOs+7EpsFAewqNHjRM4+HDUS/6RmL4ZDKtfkSoGvL/u980u9c/WEIGcn5uLxWvp71Q5/O0aJA555r0

XMXeW7id0VBH/RDXk5QGr97IgVZEvm0KNCt5CbVT+U
AR4Um472tkcmJxKSB+6odlno5eIpxK6N5FO7hfMwEb7WSPzxtf+slOytXUqmRWe4JoEOC4HbMUFRoKZY

dZCoacODH/f/TrSAtQEip6w6p7yvEKyUwSN4E8YJLRLNnF3hx4YZiAXrpy/a6HgtBhNt96LYjPsGwm+i

mF0O7Nm0WAmCc6GD45AHexhhbLtjvMHFUOYw+ypD9P
Z/Q2EBvD3GefwTfTejoNgySQzqkMl5n4jtOTdbyFgjoFoErRIyh1zAdUMsbhPFlD4P2ndVcnKLdmydK0

Neisf4uK+5f/wi5nZT2ISX/e3cj95RxNZnkuvXwbuzVt2p2SkwOEXzzxJWamkLon8RHT2Cdo7GuKmpwK

eMxqzpgVlB9zh/4NCH23YAg0l1EWvfI+ctzS1OndyZ
59IZxzrEq0bMQ05vsE+BGpCUBerhH9LWhNm+MR4gUnaSC0zmEASViuTBXc4plGinXEvuZcOc+W0cjoPJ

nAOO3SZE/lFOen/O0MJHlB2/bzxlVTZEW/5F436zK8zw5d5hmKvAKcTnr4JDi4hN+rczUqZqPXoSsPUF

Q8KJcyOKCyl+Z6jITjIoHCE1Flzr8/RvqcsMHsvp9q
0hd2xUdLan3++htMZTtVrU+OWRDzOQz+RBAj5aupK8lhKpHSBunqQlMbbqAU2mvyGaEsh3dXMd+RRYvj

icTf6bBuf5LSG5x0diq4+qXEkJHKtX3GzSF4nMGjIQIj7Y5TThCc85LsXfFtjHsSj2wtvgdoSr61MUSF

7zJEYr67NAdwhoRILLF0i21u+cpwki8l/AuRROOaMC
5yVZQH7i/gYedevcH8ylENhOldslzEi7e3HRwpWTzVb9/5YlLVrlB9O2uFmhY2vnZhjsTpLbMHssMnbF

Lmd3arF8VRjo/eH/yLD5U48cPTPac50iyzfqSw438UAKcoCvboYA6Y+YGz5wLrsB7YSc7YVT+ARemOh0

ealyC7KsAe81tb92jLRWSggWEbb4TJBKLC2YxLjemm
jOSEjQeg0drBQ+qPgBNMbz2u4aNEUEpmXtItv+cJLDc2QTKWFAjvHLwrDm0i6jgAZYb/+xtMtOiv/2WR

Hi1rh8WcB4vLhrG9zGYbM9wq1YYi8jOVcumu8GaTtOcMm8VPDcRB3l6fT5GAxQp4U8aVUMZSwtwyJGsy

JlqgUBTzFyM6x+4zMP0OqKTnqgcnmyW7Elrzgs8kr8
hHBuGVQiX2h0uKNjelWXyzv2L4e0G7s7aZREG4uN0pRL/GLeguZMbYLDncWQczv+zxKEu1CM9K6WfUxz

jmNAkAbMtQfuuZvhwvImypN+qYySsZJPVc0ym4g63XpPq0Xmc98RypGLwtRI0QsqBw4fRhx9P0uxYiPB

Tx1htipC3DCupHipKbw+vN0qgeZZAS9pbmP+2L6xKY
iVuqX/H3dx7XyyIH0U9GwA+PaPaj0Zm/rr1ufHdmaY2bbm+50mGmG4seEKEhZGf5Jl24omRw7PEkEAe+

HqLsqrGPq9t31DYcinCD/JsLdELPZah0zFYVh/hiSpxwk36bCgmO39J0XDmZeXF3GVNrb5ZoQ1y2tHP2

grmLBP7DUqRZFYg0Atn+ww8L5joLsbE2yKSiaMS463
/GH+iZ5DukGx4XxD2w+KkmNPALwuCO9fy1J14SXT1A1znRAjDM00ClAmZ30bwb89ox4xclev8tZsZwqy

RzxcUfRDtR2yFMzKvRo96d/UVDAtdBI/V+NttKB3rZEdSN20531Do18chBNiE/D0eFuF4wr03Vkw5uky

N3OU6aZahPf+LMcsyInt+T1t2//v2iVd17Ov4byJ+P
rnjfuK+9cGoaBTiDblDRp5AgOSXMFlTT0V/2i8o2J1DIh4mRlaFHpiNL9ykJXOuWqcAJSWqjgSq6m5Q4

uyaEqdfVXTZN5NNMtzduE+4ZDiYns0uNp9FkwykfKL0j7ahgpVihU6y4bKGTc7Y0NC/kJOyeFH4lJUO9

it25thXNYmQ5KkRaaMrWnjkMSjmaE7ovubXuGo9A4y
9jXJiqrMuZtOsJNjwHWUmAJ82dRn4Oq3eUKz+CtYhLDrlg1g08/yZdUJbV9SZItnjF6SxeffGU6RgGN/

7NxUwbAFiy5s8FgqRm9WFvD0aBmz27/fLPogCUq/oZtY5yeUv/0UoJA0dl4FZRDPEVdEzo4TQECRkT6+

Gf+lr//guXj2NxZJ+arsPbivDXtdk/lVn9qrcKNxLb
nlBfwf03uuyGYumuUl9apDfhMJyBBAEQxjgcKWHrAfxg4t1xnSUMZy8zNkHD9xHYVDzD/CLlV1ZkFXQ5

jJjrpcYbzqzlswxtGfKu9TedCOWsN0M/uwVkbUm1DVIfylHR8tritwIHkS4beBYnG2338r2tsnq0OI66

C38+Nhwhn5xruBqaGG3Cd84wbysO47Tj6s9l5RK8vj
sazOggr7ZkjNGHd9SEgseSfpudbthfTMiZtqqeZHmiuQkx56bCfy0jLAM1l2KJEgFkxqYwKsf6j9eR6B

JAA8TNEUpjQiI/ilcUxidE2XCWu6fpxBw1BMck0x6HnLu/xAbOJnmYAT/Xaq//Z4rUyw95ciYkhh5K6d

dXKk2Pncm96saykXYJrWj7Ga8fJUkuwJlzwzwO2ha+
u7HXNPGzYYkaFkuyfPsg9kpFmailC7TRKuSaGkHa31PclI/4MYo1l9bWko9NAN0EZO28ivR6JIT8WrDW

PgX40g6S5UsPxzPoRAJJfZjrcYmchuAp5N6Vp5B0huJwLtGo2PwwyMoGG2rZdedktbI9XljCdpUXwrPP

68ItTZm+qhLqpYq7Ms4FBVXGDOnd7/63iulCd7YZAS
bsmZEHzwLB+lGlgSQoIVwXvGBDSCDD1yvQI/oLuRa1IwOTqkBYBT6/rm/ats1hLdZCxtZaO7x+E+Zo3w

pbQY2mEDQ8HVo6OOxMrtA2VNb38yQcizGHy5pxh5gXGGqeFCgXdl67bYx87l492ACR+RsXJEV79qS7J3

zoheMzAdd2jSnecJI30pba/cJts+rzGBn/tIk09url
3zZ/AF6Rsemg6DNpg1ec3flFej7OJ+O8BD1KyVHzz6iwKYw0cv3zgKSma+FL421eH19UDWpegTkZFTrQ

J6yZgDA0Cek0yxKO6JbFw6v0oAvzDJs2w1Dxt0QUoDVWK44aONEqBrwlhQhcmY4SqtaNavQHbpvSJTr5

sDSyoikQG2l+PUxBirH0ooLKMGHDZs3Udb3F9850pd
jnV0Z4DsSzanNJBs2MeIxtmgIzyh38xQ4T2hDcLZT6EoAOxwXlBdzF545ER+aw554clTZNezLzIUwj6a

gr32L/qxTcS9PKY7hyzbntbBGotiIafY6Q9kqFvxExTvU4iNA4vgT0hlQF3/TKL4ERMuzJqswq9iP/Pk

Fj77ppVpctYEMIhqITMegAz+lRv/0zfq8VzXjR/woH
iOY18ht/jPZFpbud1CJobErd5eJLndamIMsyunJTDA8TPaGkbMeEv7apvsnSoDIXc2OvW78hu93wssL9

GqiDMlT3eCHFIuU/RQtNTcL2Atj1j3Oa2ZcvUPPMDD2gyEnV1ks4MbxiLrEb/gziFLlqFooiPv7IBZVe

OfpXzK75ThyGzkeyhZwjG1SaY5/QLBym1oJx5G1s6o
XCNCeQlAlNrfZNUtlTdIRyhxkClLPng7+2l1+vfTcnPrQKp5vC57+C4X74x1gyXqMa1vhrWINr+mnauH

XNsUXZLvsOfBojH1VbxaP0N3zlusdTmtW0MqrTfpzph/kAKT8cCNi6IpfCervtDSdokRVhYLLOOL+5Mx

aeAfaGdZRxsvyYon3DAkdhHQdjovKM84K+yKyG3d02
GaFXqm2E9/kYvmxCPdfTRUmgR4Se0dMs0ajw3kMdN3ETu3O3/Sm37+lQ2dlvZCK6bKhWofWdjStuqQ92

h/Y8Ver7ksYvyJ3vGKYXX06dwEBEOFy1uXIToVMAVhG3Yq/bQRtG5YSYCUs8JWh2xXyyr10k2suokHrJ

rY3DibyEoh368ApAJ8coogfg1ItEMdXhzF/9WlirGq
2R2xOkrB0+C8Bkx/CB3E2WN5XndM73z4/8g2L4tAQ9y7FiPTaHMw1tdIp/PtKoPgxWYkc0xhxWGNSv6F

PzaQtm/KabaCQfj1urvx2yTBnisOvjSzA20eWimP+nB/twO5HzPawmePvQktHb9OlRrHbyL/lXKaub0a

TbxR7Bpmo8dc1xZYqlkpqfo31O+I/2KTsxchf/JI8/
A8/ZC2F7aOZOdfK4qjzL+iL/pOwAIzfhUXOWKL51SW7c0lqCb2FC3I1i36k9LKtbN0PpMTFxnPE8j/da

okr9EZTXaFiGkS4hQ1UFEIERBfRt5AFigi2TE6O8K+jpQlP6oG8znbmW6sLrrDsOewtSHYVSxgM0yjQ8

lR74i5cyooWiulFnvyBYuNgu1uIcHVqWGTg7otZeaY
CclqLCIcxzpg51OysE2iry57rKDq9VMvtMc8ZFNMfj5AewrUj/IeNjoAjkz1ZwmSURSq7OvhH8ec4swn

cxcBxLKveEOP5JYPDjjzjScBLat0qUc7k0Muc7xh8aj282ejynkXaO+uXCu4sp+Vy8OYnjkv2KU906Yu

3i488fD+kLiCamoIceB0LXanZfqcscYsjpziH1LFl/
47lAGk1j/aXE8N0MbF5zBEpruH6Tm44X965atCDcj2oZ2jxC0HMBL0SFXMf79s9GMyrnEmdFZm+pddAT

zKVZEw8r9cz88WEYCFdi2eRm9bNC5L8V7eeVDUDBgliBdGxgwCXnCcA1fuuEdC++PUOd+TtE80hzxYSJ

yLJQbWV2GTJf7Wgb6Wo67tOoUcAyoiKAUpLjUFMyBa
okvrTFO/RNtrPVlv8Wd5McLr6eyaCubw8O7O/hZnKfCpafckiSuYKDhlR5f4S3ciSU9veYRY3wz4ycRe

hD9PiXRvoOsNzwVeUnIO5faUQRpby/srPTrtEc7Pftf29gy2xCeI3ML4qb459tpQ6+j7+rQ51lHLqhv1

XkNM4ca5YNHNS2HldCiIqamp52Vr373JJWOK00Ry9Y
EHcChvX4o0V7HfgovVOXpiZ7T4A7o4bEBC8txabZJFhnR2ygS7/bwcpfnn0H4S0FXApZloKdhWVPPtns

9GZP3oz+4Lr0M3Zzgzlw+FxAGM74TINIuKeSFbkGbDn/Tfhu/66jeKgoT0GaWz/Ii+grL4XmSikKs5PH

FCWO2uxCPkmWsl1H678/+Zbrfv3FW9C8MKy+cwdIWK
uovJ4ugrcVPiR5YtLYRqDINevVv7ky85YXOacI0ljRJkYxN75EG7EU84oN+ygeIowA8sbNjK08fNIAlV

BhdNdsWXMUDIZITi94OiLN5Uwqf76VazIwtN4coH+MPu0Fz97iFqwPTUQk9zqzoWeuUoe2GcML5Dnzet

dfxdINlNsmKiXAsimG4VB6cFytjCbH0oSnH9T1rbes
T8mPTSlgXz4lkkKyB6M9taipDRQEScoEmMndwbm7iGSAK8xKXBfpY2CtQ9mmykuSIhjj5BSbQGUM53cW

IeOGppJxq2XiW9iaFKG5agaT4aXz6TgXlqIwOUtB3sCvbzpJSK0VYufXlkEdFB4wDAt1YOGr/iqGqS3K

Vv5A2nsYjS7MoFVwkReKZhfvj+26VQYLcnHw3ENioM
SfYtn/eJ8ZETS+WWWvYGCXQiFBM1oJGCfU9r3xvR0aReOjEbexfEEb9rwTGvsmMfJzMzbJ9J3XNGdlnV

5O4g6wODRALcvxu4CiP22i+NQmd3FeRrqicRtMIyi6Ivtc/xXKOeVL09ucOoycASWIMh8IhekMlLqpN9

zVIm5FpJvrxQXuxEQrr6vm5HWc4jq8uoxWfqqRoEf+
bHMj3MkBZIV/iN+gvmp8/RLN9/w1TsL51RxdP6Usa0n6URFCqu62Nju8Gow8ZzmsXyxaVRCaJyocciot

/iF1rj3L82I3UYk5wP6xc2SvmrCe/ZSCs/hmO6AI6qu8m4gE1Otn6NdvN1QfGpxU95Fyb43voMUA8mhu

sw2v1wZqD9HdNbiYCOnLNEBg8I/st6Uq7DHh9QbHLI
pPgDRgDw2fsGjLQU/0o2KpjePn+6nw1aVZyi6u2KI4OJ0yBoQBNLM43AbQ5M904Z3ePYI3GxJnJPyOtd

IHMbYFJ+2+zv8uHf+MfO5xLtJV+G2ke4mSDhwBT1BgPw0sTRDc02U5QwBNleAk/1HYNEowE4pMsOU/iW

j+eq6aJzJAyXcYMJb3oc24YlARc4OmPsEFSJYo+JBU
9cjsnv3xfXj15kBRY+JTwasTaXJHES9dpQvfsEgrQKdl4iU1c0mIGfacEWvJJsE3E0/IpRGprVaPZWEO

lG7dB+oIbP1grkm8s9tX33RTAWT9A5Iy6fI6rd118M+ygPKikA9eogk649ukLvE9l3SGVSoZ25Rx3YO/

pzcQfd1d3lTe574vUP3TCYX9vf2m3h1HGg0V9ZpYlZ
2Gl1YUN0QeD+O3JEGtgskRNGHnc8zmO9aAuWNAx6WTpWr/LZO8zudqZkCC/74kotfq16MzgtmTB4Y3OF

XKnuzpV9gPXlXHvpEHpZbNz4f6rtKBBHZIkg+FPDI87fVEfV8Umk5GO+NW8Xhj7crc2rKa3gKPX3Uevp

8RzkGgZeYbCpxCshYxLUvBtJS86H0wsfIAHzl96ep3
+0tvpUMaaFSzt6qGIYkNbsRiMapqrce0b/iCvms4csYejVOUyE9r7Mk00eee9lvf3v1aG7SA1s1W8p4O

iLcUEQ+qAgW+EetgXu5sH7NW5ihYAsQp1tV4weijwBZns33zuQ82txCOeQ0g1AdYaznUkbjOGNA5o/QM

l5QZAH/UgoV7oFQfI5wVGaloXG+9oQtN4nO+gtqEKF
D+E3o/aMjpGkmZ3YDL5JahJN80iKckUM3kUV0ehy7sMnU5a7ST//qjMFPo0uiJljcwwwvvWIl+MoMPIB

MQoU7CyHWznTG6fFEfn3nHNwRMqJ7VrOCSFSaNc7TUv0D7O7JpL59804kKKrIvWUQyo/o5z6YrvKLdh5

XnHy+8anFSeZAVYUVeHfi1RMSlQYaz7p+Zfp9vxOpW
Dp/xwpoAzjIJw3ljL+GdU4gQzStnd9hkS3sYGYfZDENfN7n97RmJB9MUcnxaOcb2tm5b4fSO5Wb8RMAM

/LJfH2M4b3dcHNeyKYvzyiVsIuFeOFs4I0ga6igE1vPXioN+ATIv2jwHGEMpfIgd3dwpSPgYgHQcErl2

sUsxJdmUeM9pYgM+6ZBbGkR5uX2VqQfhPl6Xeg2ivx
0UFM+a/Hz9729W013BGMHdbPZxDpcE+D6L2n4NCIUeW+nogJOpkyqXArLXtwld5UO63ccK+ebOOc71Il

J8Jv8in3ulGFLv/Bjjr7L3UeVjRkf0Lx07wE26tYINBxnoUsRQW3f96nX3JOaC9Tvn2ulGwzhi2RMLt9

IZJMcRO41l25bpiQqiRymNj0rifSwyKiwl4sviaT1J
AuQv77ytdK8bEejZWie2QPPhb3byUf/kW4BBlMa2ed3JEvXAlMCzMYkcdNx8kKlMZsG5IGy7FwxseOFU

G8Q1JNcV4aAZNakBfOYReMLRBtlngSr2PzeQunZ0h00SOzZW7PcxIE1KT0ZCiphd6ZZAY7KTvw6b41Gu

y8+di2s3rwDG7X1N4rk6h1sHuu7IiFgmm8fyn/M4Y1
i1YXeRjCr07nzXR68sYI+nlx/2U39pDYWEuy9DAOAoBLrpRCnRG8j/QPoZbgsGkHpEbndLsLETkrcGKT

xw2PLHpYEVScdyfdP0CZUOK5UCKNcIn/pLiRcxbIfICgI81c01wp/rDKmskYjDAM6cRA976MFHkAQPCq

QdG4/vJYC7CMFeQYt6KTnla7uGTD+OOE+x8Y3i4i4e
2Lilg0nTE5ni02vzGRjBAgaL00y2z2vATw8YPYaCilB2zpcSAKNP6bz//SROL5qHqTRqSMUPvRmE3zlQ

+7cb8j4rvhiIypjoxZdMo9Zu10Gm9uhqJIxujkmY795OksCJvnI/7P8o2OmWW1Azgm+2nR4CMlyf3TJE

3q4s6R0auc1co/3T2u7FKDiHiyvZ/dzm/uJMwFjWeX
PJuy1JlH09Yo5vts65GrAV25kcnzPa+BY8/rBdxcLlZsUAjqSjiPF9fgGk4+GtI8zU3C2EHRUfbf/xhs

3aby59U0uVBIj3qhLvIQGIqwnnlKmzAuOK+OeKTPTKKfEz5zG9NkCQW8XJyQCpM+cGYSWutyzEXmcul4

ntM4r30ylQHXX1OjTyriT7HbO3fPPmKZeQTpMTgnZ9
DxaancHp/q8FMz5Y47EbDmREqwl5l73b0wcs+qgWU4qqvwR1ZdSgK6QybPsZJRh++E1LCDjFgmAjwVcB

TyQMCdtjMUaJaqLuBsXZkdw7Ufe8QF26Nn6Dm5uKl2NoqrIDIQS1PUXEOfNkSPQAYdQwnTAtkREN5Mn8

Rxn3TmUJqqRAvHPVpXujJJPSmQOW4atDx5PfUSWsX0
VwtqOizenMO/We8P+A8N4bhw0YEj/UZmc99x5DSOMfx8e8EAcYnJvxmiH4TbXR04L2pO+gLZeoVOrTFe

Jyizau4qenr3p0DgMtI9P6neIdUwNuY1smFzntVFpGbJEU3fLiS/rIDpLcO/odL8DApVMWWpiWxPTVCq

Ptom+JL/ZPm1A17B8qCPG3nmUwJigiA7Vm6lummdhq
Qrz5hAkchaO+L9O2JbuH9qqwfjS0luhGqnHD42mUyo4VbZnmz0/G4B/iUlhk3VrRTDnXtKd1jp5uaXoE

FqIEvWsvSlw0lON66iuHyW3/WPCNzzd1bqtqw0wOfhwBQjTaNCJDPBPbzxCERaXddxa3urYRWzKKkVY1

qSYoCt9Y4B2TWHHWfaQjXX1gbgiZwXVbJt3nfrKc7N
Iz3QgINRNA+9E8dx2lCCY7svb8JYV/6AAErO69NSOc9xZeGCa6M+un3vKKAUJhlKgDGC/4ufq57zpFwI

DjP/86Y3+xpMhzF8l9Vm+2Z/K7FtFYGWpHfYftaXs+EV7BVdK4SuFs4pwtRZHiZKb8YT5h3X19JwDPsV

GtWpXFKb/nvq6Fy8zdI8YcO00aeH3caYbZLZnjvZJ/
Y1mWwap88yp9sbuA6ZQn+0NdkG3VDlmhBaPu4seiZjy/AHmNqWNm4ppbQUTryq6cDpR1Y1bsyr93CICi

r5WUklGOdrLfB4AU+fUaNbaBk29zevu3l07TzPJfDEwMOU2QrJ7ZMqFnEWVzt815aUejloFD88J7YSOW

s4HokCGbhnkc3shx1qrumI9vFAS8Vtjk+XzBnmXR7j
eKdYYa2ijk9A6ZucHzF3nn+Jm0FvnfrhJ0JzG3lKLycW+E+ERCflO7ZHPMXjjcyIMnapkQFeqIikU7il

QYTBzLQGwWl7t9OWwiKefLSEXkuRzaYhAUGPTYYHw+Wd0sJzoF3yIb0fodQ785BEtE3X39meWCCXOzVt

oHZylfT8hAmWhU1fqlsKyCz1AysGq3rg8PTwsMvWQa
uqIG3U/T58dwhykSZhZ/QYARlr5VZV5ZRJytICTeDc/9v/of0LFsTY5KNnYUIboRNuszQIA2h30gMQhN

GCGZpk6Lnp7530y+4r6fsdK9USs1kaw8s+RoV0HKrIv+z+rXIlurwGsaV3mbGUwLSdewM5jnqRs53O2S

OWrRDuw64lLrYS7lmdG5QYKneHyKrnguQczTFgAIAq
nMYoeJDC6sRuRV/A7ZF5vvHBSqraox2CB/j4HnLYZsRZ8PW+MJeY+FknmFQGi9GVaoNG+QWpRTfQPOlR

4LMiKWhk4hLnKpqGyXIOqWFEzb1YhmV5moZM2dTcEgFiLyWuVm18hXOao1eJmOi6xxjSuTic1vCN/Mh3

qSjLWIq8wafpF0Crn8RIsG9GHuTAA+p+YBFsKxnqhn
YkdKHfwTi/UbzHxpVFEgfW1Y7aUQuang80SqFMZYvz3EiBpLoCXhXjoLqzk9c1BnkGWxMHXJVUKYnofC

3YF2pHfKqwsEqgSWJCl+4e8cTlVGGQ/BJktbZlPvPRcwDfgXZU80QQFgNmrarqnfMoL5jmkUuG5wSaSK

ZJ/oCpjtwYc+3I7Hyvhnib0pfK0UPBVKcVmqqm8djs
V1JuiMhefuI9bRCxmD83F+3HYXIeEJ+/DHcAMqTynm6SFonVXXPJDRrhKLatfQu/IPDQI1QIeYoUTHZQ

tg0VzYzjSJ3drznjQHfggu+CVK2Rsd+CD5D6xZ+aN4aCWZ7eyCybhZ4xKdqVA0In3D+k0IsQLhJL36lk

HuQv4ZCD3qvmFH4ZnexI7Dmayd10RcpP6Zd+Oog+B+
LfaOn/4l2m+11p5jT1sllEb6Gw4INhWMHftoLQjSlf1p5cahCXwUAKe8l0D9Jt26s3iPyyNMLs+njI6+

SdmwIzBKgzH5/tn6TmoaXpgMOjoOBeS6epJEwVW1veYgJO/EkeMZLthcW5ROB7xPakGN+JX8wqU/wWJE

Sx5Sd1k/4TrTjYtua1tXAE/Db+9m7y+cabF30AbpQw
cW0fjsQIVK3JNMCvHXQnAYiFblM7TWaraRvMr7B2ppOa5M3P9HIN2IbtR900M8kgoi7jzral/uiu6sJ6

MQtTM1zZ9qsHvaFDgF0nvNI3HaqKvvluvO+uF+JeYBV4u7GhZyeGMUo9kpunNZ+6CXlmDHlijNBIQYOA

IMmW6J5urB08/eVYJsJHDAu4CrkNjk2VXQG0gURsY4
7JnWdoDZ4hv5S3AwSOPA6YXiFnPGtFCHfMQ7d2Ft7oEpnhVJya+Ywu2lvt3GhL6GfVMwRXj+fONypXlN

jJRs3booukixWWcK5nzqj0wf0dlkia62Kjxv0k8hsV6M9VKDT0aCYgwGD/X0WshDXWZ6FhO+ZFYP4dlK

WO4lMQQkghC83xRGdRXP+ltvWWkt+X/Q+cQ/8LnHXi
gl8Zn8+Wn5AoDylllwFa438Lf1Ow12dDr0hYUpmu/SFQZk7njQ0xfuTGXzY+o0YLiP1mnCqJEMnz3D2K

DseavCFxzKI6zbKOxas4QydCVzyb1xGuTBPQlDp6+b42bbasw9vVd8J/9qTassJkjUMuDMOFVQGLLaso

o8mUHPpzOBQZmM62/7B8MK9uY4aRY4uYwYhiODh1g8
447jnZDvgQwILp1TY7O9DWB+fXyrJwUtWA7aFd8kJGxevab08FCwE0ZLvoH2RacyCqVEUIv89G9Y7S6S

f3UWc3u0x0cvabrohDG2Z08ReDT6DaTyDQplPVtLBJakC9HflC3iEylYALvedOQwRgl5PJ9sRLIX0yhK

lg4HjuuVYsz4nNf4YrXMtOU/LCtKH9M1bof/2l8hWu
FfiwIpmul0wqCd3DWLT7OmyV5dzqoLQztyMWtflA5QRPPnswQk3/DoelHo8orJ8oetv+1JdYH6Dz+SOP

eElBp1WpwjnbVFhBOD6wYkEYr2oobMBRhg94IMj0+ckdtgIdKNAvFRHGBTNce/Ce6JUgq3ln5d0Ouug6

Wzmp1kE0WTpEuDX2aXwDdeG8/61jTltHcmJrd3gHb7
3ufabxo+HQnQem5RuZfyHrQcKf8A28iG8vgE+4CMSZkWv+ZyM6vkqqT65a+mprrYXgmc7f/X11ve4yJF

Li4hCm3FZhVIUv/4wRr8TVwmFQyvsPq3JqKvSm2szvuJKVgDvp8t5xtMUyMVbbo63m4Wa72zea8bdtR9

tInx9aa8fxwXNvIrjKyWzqYZtiBEWDzZac7xhQH5GE
cfdZNuWwLJeXE/AseHP73ExRiSESmDBKB/IPpvSWzBl39oLn6jBo1NDGluJ/SyhKjvMXRuh6nkEov0wI

mhCd/zU3Yz948kToQqWlOqtD+C36Plqwowzwtq77l3x16fXExdlz80DgVaaI00L0bdj52c92gZ3WP/rF

xsM9HRw5zsABPLP5uAajQ74xtTnUB75Awn1qhulywa
HxeddMNiaZP+W9SvRZfsPd5CeuDRbO49HxTsHHrCvS5HoAHATZXvmf05zz9OtHtwFIcPIvvnR/sW7MP+

Gs4ZZ1dWvqykHSqtMV/WTOMwQD9urhAXUUnk8MWUODc1fBAL+aSV2cnHkw9Z/xFmKMcIgnTZw3dFgbrn

Bk6RS9R6MhrXVP06+IN4w7JQfZd2dEbVPC5vl/FBO0
pnG3BVy3cHiipj9ECLk0dsJGvxnjL02gfo3X+07eoB5iVigI2NHh/UCRA0hYi9yly67ZM0OJQYV6qdhE

mQ5ioqlK8wSC1b7fobGgM/dDDbr4o/2togsfegymNsxtPCJ9m4R/AhByGYkffZJQOxyIzuvMk4jUnq0p

Kjg8BsZ8G2wnLC0bKhYejbEK1mL+g5iyc89NotL0GM
C358/ForGwhFUr7+vRsbPk2BPVFalOLjctiGTW9borb6lczAHpS64v06/uSoqHG0XI2hL9ATCncp0H/c

fmm2hFMZ6rfvwyMtdLRQm6mVrLgCv1ruJ1Gg4l5EnG6V+Fx/QQuok6v4QdLUc6iRZ2PAdfp7rI/

xJxTPCAgYYcbAZyHbtxihvSDKjB2MR+DeMvAr86F8g
7//NHShLjLzWMOy3XgIPnbcbk9MTzeJKvq6L3hMTnDvQNRpGFdDs8omOXXxIK5KF2aL7cg+fOVqGO7Tv

eb7MNRqenJT4K7xG6H6LIwgY3I5m45oNahNLvAaGkaI5u5DJfRvmhcx+Zrh5VA6doct3M8Wr/iTZLPop

ogL2eTwSUoVn2iNXCbDd3JjQqRhOqAUFgro7bkassH
qBxZmTuSfNAheYCl9RBstFN/Q/+M/cFWROoM3NXMyWNXG/Z9iVQLjYnBjnIqg5bccdYE+SNjqzoLW5ge

peveLRAMWyMbDXuuqW9uvUhk8YpFa8OcninRqRyQrw5IkBz6aYKMxuBzgmufXH+jMjrpbdxBgkkAfM/2

NtlqUDGmxqwSIFgjm2XeVGD43NL78TAl5Yq99r6h8B
pqDnNfjq8MkU+02L8lvNa7sdu3ZsfoEiYxyKLwbp9i5jirvpg4UqZnenrRYQH1rMR1K78E2vl+VH7HMc

eSWm67lnmcf0W5k4+LhQXQ/RY099b/90f6SRMsx/BoWP/SlJsZQbbvjBykOCvaCv3aRVjcb4tvt8P78o

sBiguXsDR9LyuEtauIrlr1NtZ6JYOSA3/IxDsEulV5
JC5GVGENkawbhi0xnqV5y2ekxawveyCaLIqdwxTvCgocWBSM4c8oUSTYK5dHJHYotsCUprDVLrHD+qRO

WjQ+Kgr1AlFW78ntqszYVCrD+BEkr29rRRMQpILoEKewKHPoSrhDwHESjyjO/G3rWI/ySp29GuzYM02l

uVydC4mi4uKonft6JVgIGWPL0yHWXJhMjJ+VV3p360
+BqUVk642M2YxGmQmoEHjUefUtddazoh4+Xo9jklbV2FAcFSyU9joAmlv3IaJYPVEGlWIohwikA0lnmC

+flnrDLkAcn1ZNq/CxEW1Y4IYxEygjsOZmxlMtZRLpo/AjQCrlUax29N95JVTRs/Ze2a4nJyMoetMbg0

fFhTv1/MiERNTIQtST0/abq+kvglsYe+3tU5mXK/XX
RqBcllY3TqF3Uo+8322I13zMXvxR3xddzsE+TlJ6NKaUTsSEp8uESgqf+i97ECyg30U797zm/8avnKsr

OImT/pBcxpMdqeZXR12rmrO5ZALMBYIMgUqwzbv1DpbjIn58gwAAo+mtd3sDTRpdy4pOAC9YnwSMdd8y

r7rCay3rPJgEniQdJubNoRGbLDGwAIEuU3FqYNovzJ
v8s4eca3sld70A38oRbToii16g4DFbWxubdh87hjLUylJ5HuPWfHA50W7rjJRfZ991/b0cKhEIHtL9ba

71qF846uuCnRrlXC+7psrmXfGrAM9ktfyA8tE768+9yrfDCJ7OOj4xa8J1/uhy/g5VpRrwo/mWdMeLVQ

C3bYMn3sH6zgV5kV6sWt5kQ8zxk9kyfPym01X/E8QT
b2oUpmvFgDquU4QFznN21fY325mt104PNYMdCZI6coabkgIuHue7WH7O9+het87fKl0boxQeM6r6hf5J

PCHvl3vHdVk23dO0pWa66dcuxVL7xbS6gm1dFNv7PNJHsQW1dpBErUQyq82zHCeaq1KcCA3iIoQ1bNlS

SU0+Treziw4QX7YK1pW94Hqe6vagWxG8KIU5aLPKsv
FY6EO1ShpuiWFV16eRIFMjct1srFZDU+QOLoSufqt7teQeroS4YZCuk33Ll2e3z68Dkowo8B/Ni5tXth

ceDnG55kxXhCxL+xIdIAG6bqwPvStY2YGlfGvXTMj9D8Asuo0u18njKs3GJ3+/v5dPHaQmpAx+WiJ98N

cDem739+LMkG3ukiUprjqNPL1+OVVFNHXrKAOvG3OK
ptaOMa9AjtxbIvSIClTyHpTn0HKOe3z763rikgJHJUGb0g6X683xGOX2n+i8XEA1si9Wmiw1UV4Kttn1

Sz1uQnx9+5GuhPoZUrAJZ6RVfZZbQmqJshniiBQgGhafCsJ/0fek0drpSiLCB0oAph5Sc86CxGwhvjjI

okiFdW4EOapZOOVTEnYqOjwdS79IqIcLMrQsGa9A3o
3tZxYcWvCLkiFLIeeZrRJAil3Ft59pPWg1vn5NEydzH78xt+HMUemZfhsHAif0gR7qTek+tqlakiRI9K

Q0iy7fntdb9+4dbIFTfHLx4FF2sjARU4qKCZj48laLYlQ5hvJ3IzaJsU/wGIqF4E16H6UIGu5+XVvvdt

YdwFbM+Fzt0kyPfwZn9goPMnpDt/xaZg2Bji+5Duck
dq0sy+CWdhZRW/1ElYgN4iyq6HJc/Serbian+Hgs5xn679uNWl+juMT2mK82oCS3X0z3eiKVHZsqY9YrQ9lrE

n4JpOJCmeL/B8Acev7pSRO5EPaLu71OKOVNv32vr7Ooc4h4BupEop6iFYRxZZ/EPHG1QIU83AHLKCM06

j7sKEeRByd7ud7zCh0DGh1oPfCpbYARKP1nuGBql8J
sCTEYjcMWZnzj4qrK3isKRzP3uDY9xwelzPelfH55DtmaizEETySwwSYzuV/Xxgl9r+Y+i+QC1YPUHWT

Ur1rtK6wkbg+Xf5xJmxHacQbURUW5I/9ihxpoptHo4u31QOlsx2hOnH76AD0P/HuJPw+W7PsFo1ewbC2

jwbF7hNIKvK/8l04+6dQ2qbJAP8/+6n0osN8F4n94g
qMNuEzq3WGndvWJPecqMUqFdNDCmwhS8TeGqh4bJOoZf8lRWhYOQ3LOWkVco9ZKsBbjNYPXV2Nyuooz8

bJ44uInoE94YsYgThUNoPhgzi6/SlCvca4r7utHb+GDkY3TtsnH4TwC0bt44zDCjni/vNQYCwy7YxRMO

MCdLEKVXOWh7SepcoPdwMPjIRfJUX/4sx00Cq4t930
7o5D8hn3oQuVQa5xwaUHo208a2cs123ak9w1atk8OGLQ0sAgA5c8pjXhW7N8LUVjPUA8SCCcfbCYMpgi

I3t1gwAwvw69sxy5WpQDIlbE/HApCdbpqT8DEs4uzZzDvqEiMcYbr69/wtvsau/ptv8a5JDTuRN8+qAQ

8QBgbxkM//sSHlWpHYFGvAsZAFsDWe9YJJPeEwS6Fc
lhHqqxKChmrtl/6V/TJwGDXjAOeiqGaRPY1wmkq0K3sNYSdYEYIQz1F3/2F+X6OuCm3PD6bmLJMNLHmI

AMamPso0zPJm9+rTo4FiHMXMoiXCqp+I4XLo+vXLl0Ym4bCXNNcp3JFe/L48Swi1rSq4x1f5lSq/pn3u

9R1ISWJNYcJ1gznA6biMKDGt7XG5Qmob4OgohsK57n
TnT4DHfZjWmhAhNZH1Q8oDDp7JS/bxrrFrwSNpAyjn+Qy85dNbTUy54IXd6D82FUsnADtEt2vbZkURXp

0j6xGbzBkPQFL5+GNnR+4UUz7HI+/Hb17sUsvY02zlBIwUosv6bVc4qW9xi0TEG55UXtAbIsplJ5xvvQ

u3K/wDbYU0sWwFonscKg/5LfJW0fmvtu4TAjsZde2Z
eQihHUCajlIvokWe8dphF8HIFhBtzMsvR+jW/8E8wKlN8wOxhNb4LjOtuTXVuZS6jvcis2Ydhb9DjrQX

Na1Yx+M7BmoRyHnQWxPjovcqkpHQ9igfdYk+SX5HmYfwM6h5Ym2rzSef9H7ia0A8/Ggy5fWq+TqwltnS

5iUNh0YeoX3HWZjVx0aeglvK2GdLlf8nDa6ayfrtPU
ufP5n508jrIjTWw6J5Pd7RwKq0BUDOpEFPhD0jH/NcNbK+keeyw+ZCdtSO71on4ZqP+r44GarhYKvbNm

ujEqJ1D8BHIlgp2GOB5Txwxfh780v5z2xnLipa+9smmrD7wrOVX3tIA/MZZtiijQGES0ZCNNnqSRgYBz

v/8fZJIDCXy0Znuj2c1mqlKsIfXQ/SSzdGoexYvEEy
Q909iLYbf+juaXC49O+ovp2pOWhdSiQ3Ytj0udRr884EFrmvWKnPfmtLNCF/jMeK7BFVm/6UqJ/Zw+3v

XFyNlUleQbLVxcaaK7pXHTVFUBzMRf6Ocxrx7pIw5GfQ0v/OuyNfjm/aMWrUTpBdTkXXELK35dWtezFB

CFIg2Aa3KqqeUNcNRSef4LLtt6871koJ/YSzNbEUmV
XLXG4/G3dJvrihXvTkL78zyWFdcuxD8kkbb2E/LDJZRY02QkGOVEmI/igl+tf/Kkh3+LYLO8C7FbeJKl

KUfGt+sr8MkB4k53PcLzA/3v9Kt6+C+F8k2T3KQuxn08FhTg3ehXklnATzlthVq0hEE+oUs2hFQ+qKuf

i6wgSn5FqW9sGHWtHx/SQ3KeBpHLhEdWyf1vEWcAAV
9Hgh8JgIQM7rsB4Qp9g+cdykirmiP/hmNiV9K08ATucIKazXIdlNrWu2DgzCDEQDr+fF8Bp0u4XosinH

IbKGdFdWGuEptrYKZxdW6lPWFJetSQiXt4lfCcBQNFYwzTnYbufeu1B8JawzAZ+hD/ehG3i32qooWZ5x

H1zCuohoYKqinxICI3lXCuV3OgOGzGTJtrUYotN7s+
jMOY726CbY0hNKQJqLUHkx4axZgCawry5PjNDt+Vc1DhQEjfWM7s9vehAbbaJvWz+B4CQyVLEx8DAtUq

qJYa3prGNtN94jOBTHVWcDQPq/cdex019LxZK0Z6LHNffuRvgdEzQ0f5o0Rv5PQa1f7w3OPiBItn594W

nGzxG01C838gXm7Qv0LGgM59eoDvLMQU7Ys7YyWn4U
IAZ5yYfwh1PK8raXT1Ci0jvSHTTHH5csy2DsG2nuShu3SLG6BncTc8V8iPfuA95CBL4gk96BmcXLCRdo

SW33RcIq1i89GMnZDiHKu0146TCliYQPIkrKe4hYYOTaWN1TKyglCBSTt2quen0XLBghDCqYavZyv9yj

kXiOnTyaEie9YSQSHM05H8NIvYc2S0hsuCx3+aN+y/
Q++eOSVdb5RqModHVF7wTaE7hKuBYaBzR0dY0D26vB4JQ32xJcsgcABHMEhsYWQgE6uM538UxtEAJiP0

oHFmcXIr49HU3/vhU4usb7ApvaF+rR+cWYzykjtdOAQQ8jTO/fznyxEtLtebpTqhwHZkm0LnjMI2oP06

av0qoq33AThroGgyy5Ihl12+FcH3sri2es0J6PFLQ3
ZRRmzWDmhvaL9mnQOkwSRm6kLB6oaIsNPiGwfYVrodIDqfg2qgNeDEdYyjuHORVy1Ks+zn767wlRoYXF

VXFNl0x5J2ib3h6OLBBqW5kWTdtT+G4qXAL8O2LByrMZcKYp/pFMgsYp7M8rCjrLlnV1oSMtSCcWVY6I

x3q+ku5c3SfhesPZMoE+RADIm5w0l3QNpS25jcgTYa
+5VeLmqWLI1k/pkpqGXeBjw7lNES87/OoyNZ3ZYpNPdeQrZrc2q51nLZAY/gdVv1auTSPKVbYUm4WxO/

+AGLo5yX5kmt0DoO19vIZpqoYtSVGO40TGrus0anuGw7/1mnv2LHoWqpdf/5ZElgBQzb9D8+0tewXy8Z

+0avypXBa4F/Jyic8KKnPxYuK/udCGcz0sElEup0x4
Q7K6O0yyKmOjHzbMqrdQHGQLGZ+TSX+MEpMpTqjTBlFX95MaDvvAtYcMHFSh2F1WkjxLqTzs4kOENMN2

5aB1wlfgXk709uQdyn++tHxtrf05PW/cBi4W5aDsQpqWuN+n2HcKYX0LNsGIi4A2WDr6kt7uaoyeMuEJ

c1L75TamhEaDypbTvtQYEP+GcRc0wKXHMXC8DhAfY+
M1kGlJM0+X85DzwRmYwDJ+frjOScEV5PhsSl57YItIAtzpYmHyx5qMcgwWvXJrpZXdClpUXbfjYDrBwa

4s7qrnbxPrcfKkF+2wpIrl9JaliYXD6rC3SxNjNPiGPvc6RuC38FqU9hz3cFm/L49lQLvPoYR38lnqa5

FzNcP6ZRE2f0jNW2t6DteDkhtOZJ+WlVf2Ja5J3buN
G+V4nHVNaN+6Yn3Xka/BipTu1+xgQgN35jR38bc0SEvceypDaHWoVQIwMOk45HPMIA1ZDTdgv1l/gy+w

UyNXkSHfOsShe6zN9crwc+I5R32/XKIT39+yVNONqxCzfO4ipuAlHhFmXR1Y8KhP9gmKCxb75yX336nq

ibLThTalf4fKvrn/utcoUXekwxWwTgOTJLC7k6IlGX
erb/LKVDM+1iqK6zYX1x00ie7RVeZovxZL5jR9dCOI2I6aQHdpLum5aOrnc0x6v1RWoFIMaT3rcQ8CGa

shluulb9ngRiKM9IxunmVId+j25xQWQ1/p42qXYZFy/GOE5rfKRguIb9b2MyJBJWKAMkM+XE5DCnPEvO

qOyefuRuM47ptWflCTF7LRgyFoLjK96CUY04RhYYNa
Eyy8VWLieKc8Bk7xJa3ovRBoTQi2RaTiMbGPrki3w47SI76jrWE0NeiRT7s9TASOgik/nQ8ICc7HKIVc

Tno50PnUAOhYIU6/z6zUuGqQKrpEwgoYfrpQ1xrcR/jC5zmN3rZ7fTmV6ScwuO++KE/HJ9vsQ6fa/JMT

e9k+7MajV46xMpgjT+7Yiu354bM5uaSHfHec5bXHdE
RacWgbtRoXBAlhoZdArXvX56PfELJSftl/qpecgV9M2s8ZzrbXQKo72uA9PTCuh+EQNyKbCmIPj5ju4J

hP4kJKsQC5wL7TUSTVrgeHEXX1IruPw/gtZnX/3hh71afs5H9S51GAlZrZtFFGkEjMq0DvRku5VdKuF/

YjQReEyicz1hUlXw4fZNWzZjbMWtWK8+XNF9gRovk6
S8w1eqQCfGaO2g0aJbWjiakRv2h0gUm8lqm8mjy4RFaNpZf2+CFa//Lb6G7Cg4DRFAE2Lei6brAeP/T8

Hi4BZrxMw++UA90vohY+ePaQY+kyTWeIU5oYu6dmLBZDb6ILwRAy8Rq2m6Sn/Qlm9Q+Elr6IjWZpMf3Q

cpD9iyFzKga/yi5dhOxpDzpgp3xY27IC4vfbf8eNc9
DTxJZ2n6UjubQ79jtrHOHQPHqfHL0llM19VTX+7o50K1Ny9U1ngVDC9BpzNC9DAD40/ky+Nu/k1VS+ov

Zr2GyV82LBJWs83f6H7HFNY9RjJN7a7xc6DH9Y93fZyTac1owgSk1BnztL6M1KhexsPswR5czxA+40Mn

902At+zs7Z8vhWbSFaiBnEbU+OerZ7/2M/GGhN1N1Z
7ZfEecQJMHQAP/k+GbZjh5ASUFwpzVYidWuRjOb41tXPUeJxk/dfmfyHCSXC0r22i9zP5KkBEoJOPeKl

NYvowgKmf8Mfts+BykYfc74zHHOrbPilcFFVHLk63FyOvVBQ7w/gCYjiwPEt/mZH6H7X01+5lRsYHIAj

jOQeAbvs6bjTc1AEzq57U0fKFzGuZQWhN46EU6bC3O
54wY1wHD5vza9y8zNlp478GP1xpdifxg+C0MxZaxls1Rp4Ew3iBetR7KbLibh+G3rQwHxW4cra9yEU0L

0NN7w7BKzS2MAlTwv89t1ThL26XibYiErb45SS1j2uFwbLyDUBWIMaFSmGceKGimk655Nk7m4GFQZiQY

9cy85CW24As+ONqmCO5xOYqkaok7xTCBMJdO9C2Shu
REqb9qFBJy54CIuuaYi+9kzeHa8tUNuxRWzCs2lkiwj/hVU787q9D8u260Kba6G/eR0hctIN+4CqJfPf

v/3PmB/1kKQtUB3dBEoNP6jY0eBnqPmt/uXz+LhE/QZMTlUxYHrQAsYtnOWJsnI2rC8F83oAkzDLcdaJ

tK9Nom7SXGiXeLpWCFeAAT3pkw0kJHruG0BVXF2sVv
iPmCZtLn66UFi+paBBDZpXOBUx+DKbYZtQtR+BJGN1TA4JZUs8fkGoG3nBudLmWWodcBidTGWdnnYrH3

g7QNPxqWSDjPwXYo1/LXRtghz8gjjwaoEyynEz1/1jjJvoZdl66JOljcCpi1I/V0MRyzaRSH/gwzBHiB

MaMdrjzmexhHxQMOn8yp9aSXLHcQsfo6aibHUk8MyN
PHrrGynSSYcbGQbj6VUe26lPj43iKZVCzN8UGEo1fQ/KOlB0IetiK0WYb3sIcaBgo/4EQ1zIacC+t9aq

x/1hSsJzPOVvkXRAXk5FTy1mWH07CSaAmV5FyO4LtmQujqSMyHb8Ju15tt+bUZWF/WznBQZkZ2+TDUr9

s5UQ5Aqw64BtyOWrFnplcJDbXIbpTQXcFIYk0ZGo1j
Vu3Hh01j8i/x/AwWoMx7AIvi3LPA8oMi+CaFlbcehFkXTjy14K3p0mdlBI+I49EfPKaeDXug4TnuSFD7

L6ITihK8Pagzz4+QC1kCfkHJT/Z2juqaSSg5QmLDkbElgNVs+RVTsN8l1VShTvgCjo1DKhXnjvKoPUu9

bPDnZ/ob2ZnIiAkG3cRyvm5ILu9tvcnL+ngHWfT5mS
36iWZYmb1lCd/ZSYIaGsob4dXQHCUnc/nlAsVaCuCwv4ukFAM9Jgl1jiwuIQJq+aX943aY2WymSCCo9g

NrzpkTt3FNe/Y+MxPZ3xP77tskHirgWiBkq6p5Igxp5kTny/aEQWTZbVqsqhsQ48hWg2Rfy3DnsmU41H

VIMYlK+jk3CSzZwapfpY02Zw9Mt/YjAyVylygr7G6v
wDoWOXwUOCxyP6o6Z4u+8p6UoYCbtMHrGrdrXaMEp+42xUv1xPswil+ujyyE/tg3COD6siQyoDPYJsLO

/tAL3Cv9453sSYu2rxg/wL6TcHVZ/EvOEXHECkR/Wh+0GCLWj2//Fyh3Ilv1r9/PER6760CN8H0aZFiM

t3kXat8zqxGCjCrccSHOzWziWenQs916seBbK2s2rR
V1yDe6S17JoshgXXUHYo3Bcpqcg4jfnCEMfMf8gGp0XhLD70ZmIUx4Imgygbtq/n0+vD3Obyh+Q+q5sy

vquqHD6Z2E8EnFVol/Yumiko+OgQMavfTQ3h3EAyPt/xOplzBQdrv4inoX0VCc6F24hq0pwC7174SxfCaOR

Gf+32p650Kqgym3JZieT07jtDSDDqOeXJAKkxRiMQ2
/4XXNZcN+PVBLtgdpmsc7MN64JRwxZoLN3CVNeyAPX9nUYuwTG4raob8+SfiBobu2Adk8cbRkVtBjRAh

GvKsNYMtxnEhzxHkH7hxPE/DAMSa9rjAittl9FQrow1Bdjo3GT8N1p4Nknzb5aBTFIxAFxGEqgNai5Nu

oi0UF90RLI7HskvDaEzoUAOyltlQqT4dB0uk+2xZu5
z/T8tvMxebr4hlox5y0EPXwgHTFIPPwPSX278+st/+otMueIs9Cq9jNzWn7V/j7LrCOsw8LhtpKO3ka2

5ZMjXuKXYFudk/84UrRB/v1mLEds+Vz3h7T/nDUL/jhheHT8i0e0+0+MpDltiiK6m+JXn05OXCvkXLi9

Neluqgeurr9NDKDZ6WCDEvtsNkQQQkrx+hxOcRZPtU
d9AiLaoGG8I1hpskFsDVF9//qlD2hlia/fYFBrz0QT8SABf+9ZJp2CzMG0MhcHI0YTs8rRREWFhFjJHi

7u6hNf6EWpdSE/Ys+ln4o+reBQn9/BAP9frFWPHocG05flEpMt/u0HAhq/5GxbNv9mlL3k+nhef/DpOb

5yAznCaZRNHFSnO3LEWr5+w6IxMVo6/zCd7hOuPpWK
IV+4GuIpvODDuO1ohJEp40jcDXN6rVf/B774MGqfUxgWqR2bnmO3O/63f7BadxRlmUG3Fl/7MLY0agWk

6Zr8VfD318/2xsE7jQkH5OEq3rHL5lG1aRTidVfZuZs80q7HgnULzpB9i+o222049AdExTVmJrl9lxul

QGvtbN9rZOlSS1S2JL97X2EmLkosLMSdS0e1S2u3mQ
MTb6Oz3lWQ6riP95kMXkftcY6xXfiZi7WN0GuLcZzuVNqAPBqjftC1Q5avJjBB1XQ2BdEy9BJulWIiFb

ihWdz/zK1NrSATcTKohme3K+T1417tTIGE2UZOAQmhdL/3vpSRg0FtT/sSamSKe+9pFP0+U3WCWZmxSX

tZLRUlvedJ3uHYOPRSd+90GxZfPD0IbudlOJ3C/MPw
Crk55sO/sD72h/oNunNLEBpxgq5mYpgRUSwu/bLpyrFavAT1HNiB5X2ayr9TSQDHLrfGI2mXs+ypGqCl

6AKOTrrggMk4kVxJdWw/sOpppUwisWY+qZqqdW3wxGKn8yry8WmyFXJm+9DPfDciA4LrCJj/BYmTdp/4

tUzUBpPQCinDk+NSYQLx/5JeO9Apjhj+vg2mO25cd/
bR6RZ0eEwKjgWoY4bsHE+7P0j8GpzGsZ/HixPJElkweTvytyF0Xg5q0gj1ap+GuGXz9r3scWtKKOd+Bs

D12jZwU+64uUoCL5qt19OZ7/sf7wQ4MalvaYWY11T1Qgq6SrWnVq+w8MYJv2KsKb+ojYc1FCStl73ZLe

V0jRqncZja+Lw0SjP3Oo0wRvLbc4yKm53ry50fJm7g
XPgfTCGew+pa0hasYhXx//mt2eNv5ulz3nJWGUD3qBjuzx+XnPmBdNuo+OvMafsocqzqai8/r4srD7Cq

zdV9A5WFULB+Kunn4p9zAkzRQXR1zCe7OKe2/9nDk+bx8i8KCjuPO0E21AZRomeE1Nfvtjc9kEYrsbRc

6dxq91m1jPObbl+9H9pXfk4BXyw1Q2fNOYqc8cL+0f
7M+HhAfLJx/CjZ2XYN0qqOyuGYLRhWmCgxcpxATKzcRkKZbok4xMN9PvfEtNz17dyN9nHA69TMBO5N/J

N8dkPPnav7GghfbKf2DsVPTwA3I5IHd8JINgp/N7VkaJwG4XdkIY+6VDFE16nXoW11H6dYKN8qPjA1NJ

D8QQ2bt8hG9NbDfy3KRvq/to6GYzJRQlatkccgyNyw
D8TqBhYLbzMMV+C8DawKWXo9DcwGREvhKGv4cooeLnq7pT5FgKsZkZb+/CUWYSUJM/iX9i2pABqm5UJF

eOMCzzwHl5YUKDVFnwPtEbeWw+wtqFJRSRvRMVxORhU/UqAYHv8IduAoX85UKIb9CoaYJwSvHs2rpbj/

JT8LUYMDWFoP2LhOH2n3diEvVj4mmp/ZEYMyt2mMvU
gR1MsUq40UcKao6/58s/YtCA3EbTAwuG9zrUnAUoJRtBXOLRDq6Z0mD1kz7w4cZuq7D+Z0EEDVmn6WTI

g0mKNUVLsJ5dkcOUmGFyh/3V3v9IcK+JGcZcyAyfRIbIRAL6BDPV6bjCEBsMHHt3v45V3Ys89gv95JlD

Xh6vhf2atjpgLvDcKyH4ApRjRveBh8fxwexohqaGA1
vkJYygiPdM2PHNl4n/Y0IOKkBDXtSwIhNJLUlE1hIG2W2igBwmqjtYdd+ZC4qW+KvTroPyUzht0dP2ZX

XZ33WXOZIB2HHzwIlYmBU/DJbWOn3KOPAbV55xmws6TM/Sn9yWQ49x2vtx624Gn0MRhk5C1dRIMCiUyw

lZcLoG1b8QRBnENKWn8JObhc5TA/NfbqzvMkfytDhc
0I1xIH8I7Wvbm+WxWcaUrO3sUlbOn49jCeFSJqCTb7HBg0M/WLkdeY+3dFWg/fBjq3e7+jWIj4NegPAM

PKLBIoKDgJt5o9n2/x88rDvKVxoa1rbUN8ejfyQXc1qEDCnEckc+ArQG0jue/cEICGM0RjgPmeciclYq

xKoYofB/Xhx4io+aWyLjNhvmE8xytaZIlVl0BOWarO
VVU//jf6Zqw+u/alXkYgPdFARbO8N+0WnqldQJrkjTW8b9xxf8gJ+umd4kP/feq1KH34TFhrR9qkRDpK

jDuwAa/93Ju+L7JvAXnwBoliFfN9RbgG7W+1d1PtQme3XLoCMD/+rH5vBnWSbuK59zm2XRD6+ORN59Ec

ZXHJRZnlidaJ2x8z+ZAdBcf+h6ex+1mGgg0wdregzU
ZnmbLOp0ed2+Xw+Za/W5u6NjsI/ygNp/hCpNp4WMAWacIFuoyKltvbaMBCsvlO5xQvAMPu1cPL3PI/lf

6B2S3CiH5Tv1oGbT+297zW+TpCTOOvwU7RyMe6dQhqARla8eQTkSgMpqQMhe2Q+9nEIu3ZaY/NbsKN5m

shPSZnX+10xWMczUFNdy6Uz3ivp/mLH6fhk1WXCgJV
PRQbYJ/vYgoezNjoZA38u+FL7K3RhLp+58XQQKG64VTWC4Ap9Qbuw2HO80Qv/3Bow1eLx70SqC9k/1xv

2/gDt02O4DHamI8Vk5Otbi+TauIaYCecx9AWxlj7BNYU0Q6iUDFqDNtwFjpERPuYQk1uQl4OPqZT8QTN

LQskoGRR5I6dF4UM1MTeymp+yPzb9GAhshIitasIsf
kSIZJ989HrXxu/U3eJLXS2/svCBDlcz6gnCayx3n+RMZ0K1FWBqDxFogvGYps1kDY2cY3rMcpfRCF1Yi

aodk/82JX+3HK5DUgPvBKPO0CZu4qEDLUFOUaDRSHI/He/Mp53ip3wBcDEAHvO68TH+I/muGxX3gqeY+

3/Hpe+Ey84pHooBPHOCQi20UB8IGyAC/rA4Mz8tLmO
sU7aaUCojzkUHqu0gxFRVhbvkYWRQk3OXYzIVNeSFo2ClbohpJg1K2zab4nKoWt9zXTKNYUtY69Kqo6i

SRwVHxL5F84MNVHH9g6a72gX7mLOe5eIP+eNImfaRyJiR6JJerwwU0mVCRuQLghSOJnUvq3nq116I5qO

18XuDJ1tbDGJfI3Id5Hy6HTQRahurJ3KCeH6SqmEah
Cea9VfXtsRomyVn0KInOULFggOONVIt5VYUYLsR+8hI2Sjrg5mSCHH5FsYkZjy5eFeYhmuN4ThmIaj3p

6Rrr0WR01Ymywx8Xo/O0PS5Y4Tt4xYCqtKZ5iDBes4/hvC1VfhM/++2PDQbyqyPzhM+7BwAvRH6hP2ak

1Fwc50jE44oAcchOE8k37J1bQ/R4ahQBXsc/6H6Rps
FyVZ8XEZXiK/+jWVjuxBQme1VzSIdcgZiMKBEPrSbb0kjzOe++xmJgpk2jeMskQg1yR1mZCZkc+qXcQV

FnTjHeSBuBkA8UTDad1Wh4stjhJQUTuCBLu9nqkwuiGstc+sEss7us22IprogwMlNDiBGvjikCGi5C+E

yl2ZFSk33/T95OPjx1DThtBmUXFdv4N2R44sLhV0JX
KGcajJzKiusqLXVAOVyIbHgN5DINxxGGExyV85OElXMs26V8uhJoosP4Dmw4jDk/rAbJZ97a7CNM1HpL

7lp80dO1PONP+/pd4JW6q0/FgwN+Ov1omE4bv00BIes6cj/EaLGJkqyQRIgFSbdtocsxXE0h2fwOm8UF

ShSC/veqafcNTp4b6CDT2HnH02iG4CXve0fmTxOFUD
8PyQb4FGv2elQiS53/mOFIsprT4QObSKorg/PJP8yVjrEjB4+TuOXgJ75TSPA48ait5jfETGFlk+taQq

7z2sBdONn6cevcZqjsTyQXh2peAqBscX3DPSz87lViGrtYI16YsEPrVnq7P5MU1y3KCBcgQHTRBIXj3D

HgFAdcodEHc/Es6G5abyAnVGCA3DPV5ahZISsziZ9x
6ROI3Eh3cG2uyhlqaIggymuIuK22OofBa/qFUh2pYtcWLlpftZZGIFb9km7SI1qkhiGedWhBKax7K7KV

mrK85EuBnevwSxffgLAFdNJYwy9qQi47Ph518GQxwlwW4y+mC87PNLhqrk9HmzYCfPVQpGvFoi/FloPf

GFAU9kUnfK7hT5JmAhUmpuF5SSYgaH0BLXlflEEiLR
qmqHd2G9Sv9Dd0touNuaOlN8wgkMvRKNY9u6VExD9K5PsF5u8KR7NVr1x5AZUojgU/F4Dk/bjZ7CpJMg

FRpgefvEXM2uIcLnIGhqN+gAiRfA/nDoJG6w+aOfF/t1o9cBMHljSTva0dKAzs2ncDKwUs1niydeZAuM

YN61Z4vM6gS29a+5j7Qri8AzkI06tcRaj2esltQi+D
++pz+9q+yidzIbhXlm0q/AtHh+dOStMUl9ZnRVsIBW+cp+cWSrd/07JSGEUegzpU704hLOpb+PjqqHvp

ll0IPXpW/9ZSW514zIp3DynZTMIXYBkxmkZHx0gxwejdQaV5Sk/4KHGfUyatJb8k3va++Xhu/marZUXL

sMjOxBavklvUGp3WArIZeCEORwLeiWWzAhmSdeTE7k
plBxq18FhyeL7f82kTcApYYJTNwaH+DJpbfVZH071TdyCVha/fXDNE+V1pAKHDI2fjSCciUTE7PYaojb

2alY8bkfWIzd4SsohyczPWyyve0wFJeWxcn9a8ggqnv0h8NkIXGLjVobARrNbEKKP9qa4Ms3Ie2qjkIB

cdF6240CeawM9Hdl2HukFZoUZn25CZkh/73eA9l80Y
lm4zOuC/tWQNI+/IPuuUbhydnq9efeYtZSZiPdDSJzf+7H3rFJun+vZu8eucNK1qCTl/qqciwA7ut9xs

/u9kgDkyMF23L5CaA8Jk6wLXuSC+XL4fZb1DLjwPnRdi43LU5w+Ih5jxaPVOYqdS55h4Pr7htjd1/rWx

ddsUkcO6f6vLFoq53svS7AvF4uHzKJS9s6v0mKNYz5
Gf9jFUe3XN4bYFsaBt81dAA99UP3/vlGxFfjbLwi6cKc+OBfk01DUrSWRCN+HPiafSAwqKh8OX1IXuWF

qf/J5WSgH3nkpLbu1hiIbmi8kssTDM1bRDnwnTyS3a4k66mniOUyqeYJqx+nrcWrF/HbsWX7VMm8ff9y

rov8BvTTy78qr4Fju34Rg/xsxbBcXRfWG/D3AVn7yr
VTkoVUuAOdBvJkDnJEZPOLqU8R8Bb8nAaEJkTuelIxiYyFKE/3j/zW4B8Lp/6kl4ACgCoo/aa6/1/J6u

vdefU8/+VN82JuCvRdyu7pKoj/yHcA9A2zOQ19mXuq2tNvtYxqx6ILGNDOAp7Cm1jTaCv89IvRhzHHfY

jAZ9oLuJKGcrI/HVfbgRsWvHW7CtfCjE9GGQ6EaoD4
h/YWzXghVfLM+oL78FzAzATq8zI3fUnwkjxMXS5KwA4OgMD7rug01YNgi/Gcb+kQEnlNKwgTJKsMl4Nh

hVOCea58FfoSL1i1k0ALJNs9/nQbshWloJ1fiBV46me3DbNDqPCM5tuPjNVLgbNLUyrMpbXzTnTxIa64

9x7JIaZVxZBqCiJrh11M0cMz1cdjnueBLnyplyNtmy
vpXkgTZRsye+x4ruERCr+aNriyAESLscr6P3qEc966bNY3tW3IHVb4zmsuu59fXyjH1hZUtiismBKFKW

Wp9RIyS/YY2yKEzPGZykrIxMYi9/RWWXkY/pn35kt5FlQ/oj9eF7moGwN9FCXIeGIrbJ8mVHGNlbu9hm

kmDNTM5q/KxP1+dMqs0JxFlIWFcqKRyanMZ+c7GX4U
KKxBbt/3eef4wP3kRPqVoM1Er03evqicZITDmVcf2Anxoaawc7yRQI5M+qTI2EEot5SyTgja45SYmBpC

o4aaCxAAKkXOVe0HmWiLyhEiPHZ9M6IBMXqHA2RRhBee7FsZ0hpmO6b69K4jbvNV4rNegkUgOEjnWLoE

AekBzFIQ4kGJ5b8irK9x6twqvgt0ob/IvyZYw+NFKc
l2KtHYXIxzjLJNQ29nMDRDyJbETsK6zdGu5JEvvvpeFF5hTXMwLMU7uWmdy8seyIINWTmHpa+4y/V660

SMrxhC9KZAWcbfeQfxbKzVuFcfYPVds0LUR0e7PJVyILkLj0EHtoZnWL/dZz8i7u6mjSbWjTkS5bcm2v

lHGiVn8x1RxLZMbYYbaB+BYg4va5owgvuNZnj8Hjvy
wD3/q/3GtnsT4qw54pisI+28D/pI63X++BrhjWSkiDbzVCR1WXS6o2Q8/IsBAGoNc5cbVktiS4sAaECg

Hj02oBazb9CQ/Irr2u8B5O3UFe+rs/cb3LYKklfUBXR6LeVsA596sFm+CNNPIu9Lf76pOf/Fqqt6BzxP

y4pDKv8PzrSSRYu7cRCUxdROd5ZyvfhktXLDu86AD5
Meng+JONZ/NuDRNA29v6fBFHl/RUUn95x28+0eWfD2YatoULMKiJQdnZLELSfoFxTu4amBJQMnjJx0focQ

j85svprSleZ4C0mJHHRMlJFF77bdeZ/qC6tKvWI+vUfCgzuO3OjV6ihiADiG74iEtEIYEaxwSxnkM6Og

zm0AxiyDr7RpMVjBVGk2kYF2H1n4DK/JIzmCihCPgN
YVjJp7Vk0OaqhVRq/tE3ljL7OfWfQdLCSZvxw2Qd9ZMlkW9bJjifKGKil5YeK6b0i/2CqvjWsLFI6iNK

4DX699bXDSEXENciYvhEGP6AfevEOB6beAyClKBXNkvXLwSG9PH/3XFYGVhreCujKc/b0Rsv6XM+sf1J

OyS4RlN2Kic6Bt9fWDByg9e/wQH2bxksqvw1qxeCLX
PL+zMvkNaAijhJOR+nrp5UKx4nxotnJQEMC9HUGJzCW4zCHLWEv1s0occOhjGUeqteJncY9vg7KSXX+P

gI6Jm08M8TAvbFpYIWocpH0kRZrV/UU3Zpqu636nf7L7k9tHuUgAS/krWA+amPl1YgYZCFUFJPZQLnrl

qvpgvwn+lOUhFuevtAQzVAVakP1BrTsWQS4xce4UGn
CCS+m7ZeR2Aril5rq0+HxXWt2+n78H6lWN5KAePt17PWMokhQm4CXUL91EwEDR73B5uo90BvsxGm9iwL

GHfk9bFF1lmW8rSEuLxs5bDnzjJwEXqOOlKrBYHOL2Fgj5ApiS6yYfh2MfZmp9YpKdZaEhpO5/tqVXMu

hMcLqgvlVUXUwO8KOY1Obd8vTdTJa1J2djqST6nItG
k9rZeMZ4cL6Zt6EJn2gq25HfBzRamMtU1Oi2ycnOpXLvtplwhxbHQkqZXUkEGmXqS/mbYw2SIJ9JRwEc

wz0KjL1fSCPA91VKY6c4FfyaoNq/fr2mUsZ6tehqKuQG8sHhEBiA5faitmtqWRwzLcLGTig26qPQ8JzR

X/fqaaRr/Pcc18e1Ydx+ekNCD1VGSx0aUis8VBaI+u
5ngYDXO54qC0W2+8VevjtRCgn6FQ+foa3W8JVLHLU45gTRaa0bg2lnV2BEdDjlmM++cVyktHNOxUxt8Q

n7N+NbCS5+UEi/8J4sy8BtE+cjIKFvzexm2/cRkBeM+08duu3OKZjHkSWAiP3wTTs5UtpHmk19p5Oczy

HzP0zZXW1hUcWh2NQQwSBdQryiXVc9I13rrdxhzNuA
YxEl52URloRx70m3JqFIlasTSUtfu96luEI/wBooS0cgg7Eh4afUpr00MuDdwOdJoMQRFPaNOTIQ75X+

uZBuzKvOS8cNHHVBbmzyBPzssaab0xFWEGRsloIn3g+Cora+OP5Y1fkhDZXj+tArfxbxPvO0tBsiwcZcS

x1Cv2F9ubnkVjA9PqMAkhQl3+PVkoF04wr8fIZqsc4
7fcCYj+qAMUEXgaIHtUalk5KPe5dCAoDazBOQbtBJaz3EA5l+6SilEcgU0zt4Ye/bQkLxmaU1kb+i/l6

+M9IOlg5ASuxHPcuhVD21t5K6i+uELO2lcZgha29HPWCa/O8JUT2mmEu7fN9iFtBaXXYwojtPlalv6gV

eKw2nfDQ8xjJAu/A1MEIYC2PvsYpTEWuUEOasp11g9
SreJanlLvHqI400lMGQv7O5Au39WOTpC1wWCLpwN0jZbtw47yhda931/oQcqsvbx9fjVbn8sR8+jlZTM

ZKFpXVN2gWtjcFhMr/ZVpVtWt2Ygk/6KXk5SecfnP7ikNGLFlzdCjHcRNjAwA7S2g6TXni50Ol35Pu/d

uSJgq4uC/F6PWzuB+/XjZi4K/AHso9t6IlWqoHe9xT
fm0Xag9OcM5Ba3/fkYr2+e26CPeIVcAzU3BGgFgQJKeqEPpAkdFdd5s+5ZNLdeaRJgqOdGVd7cNTefVy

Lqz/UPaEDlK/Y0P/MWYR/l3r41Z5R5UY+94KDqJXTWQgH/38x4SDip+mUaiIoFN0QKlpyY8D5JSN5M2G

VoyhalTQRZU0LMnXw2p1AEwpDqtXL0f17bVwDpTdLi
tf9snSX2gdYkafV+i5mj6ADSyGhe3ckghqnxY+cbrZVLFclmCiihKT7/Dr9kAgo0ku0C2zY23NS+McTt

VOcG6B7h4L8iaYhMgZ7YHqt2MmuUhzu5ysPz2Lna2ks7IZEJdB5GG6hLw4aHQhWXA0NMvRlSwKX/sGsa

SBDAq9rdjl4Ghjtk1b/eJ8mn0w13N1aWrRJWplh5Ad
g0a9D4bYbKe5sa4CdE/tf4TBn5UuGXJrB9POsuXfHgpyrAdfF3bOqyDQethwCfbFjhCWe0VtsJmcTRuP

jKlWs0Tcd0om4Fkn4dPq+vpzjyhBRyV+5KEfJRj/oquK0S/2KHrCkYrwH//Mi+MkwNJ+0uR9iiLDw3WL

IC6oZFawLsUshU1PlPAY2W8/gBxOHxPfao+s0IhUC0
ZuNFKc60MZaAFfrwz3rSz1cA91FuVMFdR/UUL9mr0EkhuhCpxkwjTohnjYK9js6yO/98SF0i71fhsG1u

tcVR+q/Y3T0+b5cgL++vuaVSl6FqV9ikoGVqgny3ydhA9cm0G1Omw2GuHhOhO3D2e0N3h3OOgStn4Z2e

MrbkyqnDe05Z4cAIiNY2yH2AspGUwz7exvJ7ESTTXe
IuQXXUdaeAyFXgqnLCbKv9M3KGTUTKiMV2TFRctJmGEZMxY0ojyn6pfSMbnFYhXvgblLddmjSGjb3pwP

umxwZIzatjQNbUZoO9fVB9rl46FQRye9jQtnEoi+jk6YQ0zMJZPZxwc+rdGOkUtbXO04oEieAl3t6WcR

jildbEkHoaiAV79YFa4CqSpPw/pN2/GK33Jxumvr1I
/OW4i3RXf5cU7/9LFiL+u6oL37ZHlGLkSESgGM5qxl6GtbUeBZ5qJtvWUqZu8SZodY7br1Xu7OwZlZPs

tQrfbwAHS2RHkJD6IinW2yOTI9S9Gyy5i2PNe1wuEnZ4xEJIwxyTvclXmkO7fGYx9y9GdNMkLQjUVB4B

7gOtnVBwRNTehiaMqtK/KTFrg48Vn5csaV7PWyzWVh
jMDc82z6QaAHjlwmLw5qaIjUts6oWBCsjVMY9Qb/iGhkf3Gsa8CH0HBC7AHucJQcNvIfdKwqFqlMPoKn

9HBqpyNZQZKIihf6PsMaIjMnIi6Tubeo0WlNonE/POz5bO5S0fZvH/PqbFGbh+SFR7uUGPSwEGuI3IwO

Kn45yhIQSqNeveVgoY0mSmVmD2JQcIbLoE3GYzJw+F
23MXTRBYQeqXrnRvFEu0aa3YMTGInogTN8t/XzZgE0C81PwE2MDMqBs83tha1VTjhRdRFci4gTjzvwot

Gw1Oc5eLrB4LsIyGJGts64U0W5QZCkbKHk7i/I9PzjKJPfqOwmF9lhBqZ+AXcV5or4C60Yuwh12hliFj

xWJ8S3CtOb8wFi8xc7XGWrqtQYYipELIpOKR+HpPNI
iZB0CmKDmI28fpCpqnBsLq0AEnScFtmwlsvmGB1w8VNXR/f32Z/1xB+p6rlwe5q0op+pQbJQKyPLNdvk

Zv1Q75wyPCwIr7TwWZ8c6BViMEYAoU2wm0wXBbqjP+VWUOV983XalDeaV1IqpS6sJ5ttgTAQoZkP01at

oX6Y+pnmiHRMTaIRc8QCc61m45uAKbloe0EMLXn76w
5zCTommQ1KYmSwRdbzp8vci/Re6ShqJGJ8aqIn8paWMscQ4eoJtBLH4t27QElSX6mEb/VaHUdGmfaHRn

/pgC+XJptA/fjQmQwxTDbD6idXWfVokQdmfHDZ37QQZlJ9H/9SqWY5PtGut1ywndMGV1DH8ioI5+xKz+

hQEFrHWV1nUJcYESFLOBq1mQlmrKIJZNoq1HbSBssK
Pp9y5tdAu0FU7qW5iZGSzAwev+ZgSDGvC43cjU1hYU3499JUyc4e9XjkJNj2yZfFch+JbYQ3ES3W/C1A

cKa6YAT5O91tH8gd/+0qbrqCZtYPWo6lPUocI/7X6laslf8Nsw/zcLFTazluoAgW/e5+GSrOYbYYw6eX

h2FeHk2exs5K66+va0/no2w6En462h1qufxbwDvh0a
Kuw5BItX9e95MCDqbOWRVh0SYWURTdZq4X2DqtLDL4gn23+JMVxWY5dVH/OiKUzU/fQMy0SpvJcgT8po

sW7DDDg1QA/Z0DjyLlf6ENnajFwui6sQdU6O1G6xjzYI4JI88G2HCl7KNz8f561nuU5uRYnmJndwJCKO

XH2OBrDhYZwn/Cearu7wFF5Cu//reVRlK8t9gTvGYB
Yd1ETSEiokPgJXR24tvZQmJs9197rNjZ09+G4jEGgab5jZLLXTD5r94POpO9DP7Czn7MugDG26G8ZiA8

SWUWyO/dVNYWgfbeyhL+u+XqKtCk3+B+YY/l0Sk/lzdHoAazpcim/rLnsxg21JNbvXalmB2cZqWMZ5Si

68biq4RisrPpMKrX/neLX7i0vu17fUtR5cYI88tCrM
2iIZ0KNEKt1254fV0ZTmoARWu5OAALsSdV9NodN52mv0M2by4lm3i1ytveE81DRC9LI+tdYpKVq++d0v

rP80JxyG/M0Yvc+EpbUvbAA1hBauIgjM9O+AevmzzjR1MDQBB6A1Nr7kPtaGpGym5n04YnmZAL/ZijCS

ohu3QWZ+9662uXpqR+OiKqRYwgIWgTT5U5Iyt/MHqA
eFxRhG5eqp97hMWOvJBfBNdGl4NkMjd4ZebGl0AtO+PxI2sqdt278NyDccIwb8BwunubMy8Iqd0okHT8

H7oYE386UA13wg+2N1YEesrwfOnieYH4HNhYL+cCA4tU6eJ8ufvhfwu4YYshUa+rMZqTJzV7KQGJsqjq

a5DqANYtw09GfIC+mvOoALrG91c7K3G4j/U+dVYRgq
zmWVs8mmMcvjd878uKNL0PuwRYQjmReeaJgYbTbXPPKGDE3DKP+HF1WqsmhoIuxQ43J5/O57dlUhEN7M

tVEq7B/sEvfWsEU0AuqxxZ82YuMNFyLeqVomlvdNq4b8j8QxWsfurpTxwQ6sp62pL3/BDS51iKLUHbq5

wowhoCz3JHEG09Xy/Jfzp4mh9MtkUE1m2JI8hr6/TP
KbEsPFtM+YP6uSpC/wWio7KiLohUGuvSOwdhdyKZKbsjofFq8HAnS+4sFrJfTmpoRcLHcZ3opUNeT510

eFuVaFO0XTZ63VUtzQKDnRKwgoXyqeF7XZJTRIGtkOwRGTDfX/3laIhkJ2RnsxR+MYt4zB0Vx6iTSAMS

0Hf50tnY11J8O1E55k7bYtoXrkL0uAxHAqlhGxFfWq
MNzFdMHeO9mhXhn59vog78o2Boxl/pDej4tnawzHkMGSuWhuvi6EiYHsde9trYT2ypYNaox216M/Hc9/

P6TA3zON5bJlErDHHLmh/ZZfoXj+TDU0yJZe3zfUPTcWtAFW4oTKnJ2yUUSUFxYJr00jkwS0DMko/ix9

acW/MMw5E+Zc/79/a2gogoJ0NMMmvtoe0+I+Wzp7r0
CFgGwmODPKH4/tI4kxY4OzUowwKC/cWJKNbujlxop6dAe/j9lBreYyxmr0QKGA+cy/XliHL6K68K91H2

t81E4V6l+fttAGXnKFH/Gx5pcfKt/7YdZqv766QmYSAo901fsuZFXXnlIEKOnRkqSYtYtPo1Yg/obf/Y

vH6mudHp/y4f4j6qtWCMRzaKjnGteKDuSuryxY3M06
ckulGlvS9epAplXQL3yh80y+zuXQ9a5syi79UzThp7lhm2WHGwBRd0Yq90ncWZ9AzfokGi7gZXvh32VK

6jDyyQhBb152AntOIlI7a7FNuNpjB22C0Q9XdbCVJcmV19/yxMItCwX9IEqff3JUPFKLuqHAJ/zzFX52

EWBhXcQnnvO7LsrphP7hF0zn3KSacAXGJbByHWO5Wi
AMgIomQg/NXrHfwHtsXEC5sB5ZN1waHifGnavwwRGNVVQyC/9jkZzl2Fm4DaM5wTii5ckXp7BWCifNX+

GQVtx0uTFuBerWbNBbD8bQUV5ZfzPRlCnpdJ9VrQSot55qEMwesIa1EFT5Ej+REu8kBcWLr6jSR7HeUR

a3Uf+h0b4LaIQjgubfz9GHENdk/66NlXEFv9e+ssOo
RM0IKBZyZlaaBbhzOBbMbnZ9PNkWWYSv714eXeoPc5Ol47OFPoKTq9jb7aRwzMmT38KW3/P19OeWJvMA

ZnfgkuC7s/M6E1lhzJi9Pe6y6X0Vk/WrnxVS3qFhlCRdTIG2s3Eew8Mg8e+oqCJBuTyJicuuAiWhFebo

CDzazqYRiHDdKK8hv17GGwC17uAKU924+8TLrDYOAc
8wYhtTKyrG0JAXhCrNabUT84UA4PAJT3ZFXG0D+GWUBLlDcMJEwlPGHZJL2od97wRa2fVbQRYGrn+Mvs

cpHu0/zDUzh4vKXRapqB8HkE/ZXXb6+hkO+wPYv9oJjWselttU7mpxOtUVT1AmAo56Y6ze0Za794rP0a

GX3pNd4Lnfu3SKkovySTqPQQBzGeH+y+R87oJpSPpJ
iYahMZG2GcpwqQnX6xa6zAAU/S6dB3SBCfg6VS9la3QHmGWgwrBrXkk0ljnajShi8VyZDTHFBRgscalF

9qbklRWPd/zeoC4YNTR3cqTkpStleqN5mIaQUeCZA6biCIoyKIcr0gSHMsYIZGE3KxlkXlWTZUKwiDm+

6/endVLl8GXzqAG88Aa9GfHuGRYC0S3TxIP9aB1L0p
/VuEfWuKpROaAJERdx+LTSQwUC5dSUgIVen/0SDdjPbnhCLxo3Ad9nZfUrj2KJdMmc8dG5O/m0c3ZD+W

EePKxNRCcsgw4jjfBMzPh/ob7IltCVVFR26X5xRiuhFguYYaIW9KSdRKs+P3VEwvrHd9wBnzTVTQqDuA

k45DIRl1hWsM2Ikgzff/Uj/5JL/foi9OE+PbYcpf7S
29xbZtohW2AbHnZ+0zgodwke7FvjDmf+otax/CIjPUOWIgveUbMWr7vFytXJQwzMtDGhaqmDYXluMBTY

YJQqimez4rcntefJmKf+4RWWASqm3MT6BIwofhhPPpFD5xsvGtnmqL02/f8yxUxanpdpFGw6ZUtTdePL

WiaFItXxM0FAIMTzINOFbHyF46GwoqPbZDE6nVsiZj
BwjUFD/+2Wg5+ap/hVOM1rMw6MVqHImRsewebxo/AStW5RzYgDuWnHnsr3DRicg9iXk7cLd+uo9aRSjW

RDDGloLX9aUnPEIp+0NJznhLK/fnNDADVNgfarM0IwTpJaMTUGwR07y9vhXvBNA8ZqSdMxImZfWZJTD2

Aogwv1cuoJcvherOdYD5JZC81JD89wfuJBe/zYQ2Bd
s/U0V+TOKGuIdDB2d/YHCLSptBmUzpSs3ypz++fWsTBzTPZVZsN4r67HCZHamW3Rz4lzTkM0mc8zO9ig

1EJB8plUS0QevtTnslr/u0+GXl1zD5/j82cDPrh5bef6SHn7tMRiuI7epW3oTbygiKHTfLjguwoK+riG

wNKiSJKzO2/vpKH0Dogb7D+VCRbpPqNoaQR4ENZ6/j
+jIroRu1G1hBSsxQrTWtu5jwADal2lj+ds+yvhQxaCUEHpAl578xRR2DVt0k/QGE/OkO1zQBVwrgYPXK

9qY5ZGJBFC90WbMg/DS9hC1mmj0xkGgiRdAbRjZuNMVYAzhrhoOd/127FAKUh5bOW+nP8WnLY/tdjJz0

EH6AB9+vFaayL6CeyjoiVJ5/DDiofIXBwy4twO/cMf
LCZfjMTO1n2lqbhmsTG70pK5qebrrt3OkAkEaf2Kz7trCEpK+RjT69H+WJqsFqenwYS0ahqj/7rkIJgq

V0AlTelUCFUkyZotzAoPk84qk94j1zcDT0fA5JwZeWvpnV7qXmC5pgY6w9+WiBrR4iqW7AseC0CRBUuk

3zABhk/NRFKmUvLY62IboE/9sxWmNaMVISQ5pm8Uc2
gsy1jiARR31Bcomfn9FawboLhMR3Rr4rwwugLzRsdVqOkHjp938B2P5iUYNvGkk0H8o8pivN5Sj2l0a0

O9CMhMjHQlPUwpPPOK7o9ttRYNfmD3k36F5H1+58Kvm7SSg4y2B26ARcZkiNd1nuAS5WWjEGZsWVvwqF

vsXrnjxbs4fZ9Tr+w2Vbi8Qr5OGPbDNvPGlOshBO23
h8i6Dyhg0/Le/uYsy1fl4Yx1jqth3QSlwfDcwv9Ed0Mrw5wseFX495TFZKCMTtpLUAx6NYpveJttAKQU

83j8rWhd36mKW+JB4TkUzZQ15YVJyO3QbesHqDZDlY5Q0a542iFIfW3UtdgGE5fKPw2+YWSU6HJqCS0o

Pxe63YIbVobwv+aLg/z8ULh5wSqiF2Nqt39paqMGsn
tE0xF3l/jwFfNilwZnwD38dw1/vayu9AzHKKKE86xl+/T1huelmDInI0Yf4ttfbflRJ9ynGXZ7n74VLb

U9YcprB9Yl8bCqZlkkSb8y2FVT+wtd6Y95yGV4YRdaJn9lC08AD6Eusiy3u7B7lkWg9MOUtdjfh6aYrC

htTynrT5qbB3sQZ1AlHlF0KrC81krvkfwdaA/jI3rj
25deDLmvnxmT6/o6hypD4GrnLUauDgctnv7nd+qJ2x3wrBq9L4NaSFnWxhZW1gkSYfe/7JISPM2pmahT

XwQjXFplDxkjGa84CYtFCFXNW8qMnhpM7RlP7EPFdDiEQOqcLenNLprr7cMMTtrH9340xsywg+96NtnK

kE3+zcp2rRemti3TVLx0WgZyqMDnrRCmZX1AK166p4
AC7r7lWpMA/yu0aAXfb0SOCmj+iyvt5TkypjcW2ElTsqcmW3dasc5sPl8Ozao472jHdkXyIg03TkKZ2b

5HKceoDJlKFCWuwI1iMh9vPZn4VbBmrhxM2a0ZqPxR+/0s1G5UmfK2Gp8Zluilzh41tYTFWHgNTVB0jk

LNIjahSJsfJ+otrO3rQW6mHCQVA90wLlkCxltK+GzD
LFRVIYCzpQloU0e4RwVDccJYSwNyc5g8L15WRX6GpHyrMESgFhJBk+9bHZxcQ35tOOxO9rpFJoohdZUB

tVWD1Of2I24Zxk06JkVLE58gmyyPnQH3BGhQ4+ZGnSBNCsBkDnLtvxgj1O4zeLR56NjQOqLshSqs7ODX

aLLHOp8jVuWC12MsD6d7xmxUIuhvbib2quM2DAFTDd
+01FXeP9XP8/4r2nZe1yZixqCWnTVNSxjeYXdutWKySV+4j/gyLjDjPFLmfGQsPbjb6FhTNvQ8/k1Vjz

mP3rTsJ2bdzgmkBVgHDkfy49ZyPvU+M7E9WRXhhrp1jwmrBL7VckyfweIGu7N3R286XwzmF5iuqe+VYR

BikSwy+KfS8D+IdPh/WKS7LtZOZ8h+GTgne7Zt9kRm
7szQeJWzCptnj4uk/03Vcl2UvlvCEtlqZrGWjPFb9/tsBNtpetx31KywEqlS6p5raPMv7EYb5sMraxIi

vDnt0q/PWumUFdl/I0gsZTlQ/qFrK6DunEP8eIPIv1V0rJs5wLos2doEKRnXXccIozsiSrefK89hLO0V

GYkvkR5ofWtrvmnUqdoTbkcniCdCOQEgI0Z00aw0j6
qBtoSCe4neYoknGAhzLF2VIxLYhQJWMsNSRF2Jm3cthv6lIGkV5xxJ1K/PpgmM843R6l5Z+mAC0jkka3

rEh4HE0o5sjkbdO5MOO7anEgUoK1o/dh6L7nedGfVcLbwAbsudAD25hV3IuBsEQgEyRtpRY1eGoutpJO

f6IVZ5WydrUJ33A+NO7m4/l91UPrVJr76C/CGDY3Ho
eJkD1PRwjx4a+BjXtfNJyHsVqt+MBb1DJgC1rofqkvJXD8aSEK5Eycpkv1/mszfFicNGBx7ljLPpY870

B6aYh4VdEFWzFfGbscWuobrlJidvXD6jkDBjiwD4rHJFxuQAnIOVXoYPeNrWQ+53Am/7J/eh16WN26BJ

wGSkwmROs+k4nBJxXrJucNdpxS8EjltxzSu1vcNq+P
wlSB8lRwlfsbNUhzro2IbQlw3ng3X1D7A4UxExkD+pOamHYvB1gNGgJg88mmFQf8W5/okGhuYnBfUCrF

bbvEc86h3APq1mMKrU2H2VIJMmCDscANUiif+8JwhpwP1OfR+M0SGOD62AhfGr7uXbG/1b0Ue/D+UHLA

6LRR6JmIGM/qsavbqat2uYzdM18S/vOxPAclrYQPPN
VDRmk833lmoLLq8eUhsGpO/7Q8x/06Jg1yIZuGe75CGJGJ58C9gdhfAANGq6UnKXOIVpu8Ft5T2S6TwC

NuG4qtE7c5FCUu/itf0g6MAhqtLmJHsky9j4jW25ed4f1ZkFVNqlrNNyTpzJJlS0XdxTEKCkaeaV69HJ

FDhGuo3bpvMQY2hDdFKQk37fL0LKy5WzCKtZmCle7j
0rmlaX3l4wuLJlsmuzMK6pMo3XXFhZI/Ti6Tf8jdwXb27DJf0mszAAxr97eZ2sQht6LnVo9Cy1O83ND6

03YHsC6cdf9FFo5zXyyWxOrA3HrSURV88C5U1ep+Nxymk18ff4RQw8faevJE25kQqph0ohQ6LWcmZFGZ

1yR8vfKiqpzcP8kQPdY5BqY3kIiHqPH8AawcjMTicT
WcbDp7U8h1ExewSxOoRtQR/YBWuNV66tcg/pT9HiPOdaw3pdgxOw7hsvYMfcEtD/qjoryep/PKG/FScx

SsXyiWfhpMm9vzCkdrRZacgfaGg1B52e9E4usLh5YboJ7/bmyxlD7sJO5TQR0hA2EA6c4LuofVrVgqS/

CpJu6+I4lyyXsGPTea51GTrre8zbXSpGdiW6skbyCx
qKEetGa0v6cN/Ew1kyA0d/iz1DHwhXlaAH1Lpa6pq/1/YhLLdt5FPD3meC53B7KrJ5KGCnJPjLFP6pjF

ZrAu6dpxbFmm9vE5zn3dHR4br04Ta4gqiGydZSb2gC43fO0tt9/ns0Wycy4gCufvSclCyK1g+MBCsl8V

TSv/lVGCo2lC27elBGDpAsHnswgxN+lLL0S1Q/9phD
T+0CKlbm0vCAv5BTYA9sApkDgQtixaCKl4xDveD1sT7QA6h9O/4LxVBTqyBItGjIauOK7PesjGAIgkcr

cIXxjYHJFYGph4phiH5564kqgNV77DgLf1WstuMGr/WSxS7rmtd2s1xEfzvYw1D8wMMq2w5u5NY2W3nH

Oz/opAlma7Lf7gSyfVFHt2U9ypr1VV8hMGKEHVxEFH
cN05hRdgXx1CdTRWgGHmgpgnGqx1o/tohvqhOi/nwQAqnwsoamMb9+MgZv8e4P1p36s5Lch2JtVZH/OZ

vBSChSTdkHVbiM2YSwAEPY/sSTRPECI00ElK4z7wYCciTY+/XwEsNA4KN5jNGfczNGurqRc9YqWoTACH

7N4kMLPaY/cNEIIYqH6nGuB4V5nk6Kk/DyowR8LbjX
C6DQPRwzyLDyvXTWVPU89CkfNflNYkApWR2pl4JU1kLjPY1Z3mUjRrTspCJ8ByuhSBd8Uu9l0S7oGUAz

QhSq4ww6yf4LJi5PMeA0nXiSuGVzHUuzUev2msoz/txbabTcCIqV48IAxJxiR6Bb3f6Fo6q3ebTRNTtN

TMfICL4J/s/RxcWbR2+xFIVey57yU3b188I5HqNkeR
TBCv+ylx5ssGYDvB71w3wJ4+wxrj/CXMVLi35FMm7/711N8lSnBhpwsOidiculSttqUd7NTz+r0DywbN

iHsB0IHXvtS+RDxc44zfUGyuhjTRKSqafH6+F8iCxbdYXU+vuHuPMKRiJbm7scap1oHXBF9lJmAnA/oo

ok3nCcYNtSoY0qSQy4b0O9UMgbq+TrYvOj61jscNyZ
q0KDSMFxQYxbmei3vqdFiz9OGGYyedNqHkmmX9iRasHjhytSWUx8xfcLAz+uTrK2CbEqj+2zZb957DVC

snD90XcT837m1hK62MHvNh6Hr1EM7P6wDUA8ngYt5sPNRUrFzoS2y9wIYGkA6eDNZdvtDYpU1wlSnxqn

NSIjvjqz9xFbMVRBTY6KbENdCWklTkPr9g/CoF3n6C
Y+WWrs+EYHCiHy5BB28xttygf2Aauns04PrjXbW+G1p31p2HLhwg1SaHFfBdLrk94Vao+eFtgYAA41v5

diqDsROqRBgABpxZg33AJvZpHd2BAUkYKq83pXsi2LEL+SYORlJlbtF9Q44M1ta4fKs8Mn4bMLzkevPl

0sMseP866t71x+ffkX6Mv6BTTxTDB8k7QVj/+0h2xX
snC++SbslUpRkV3JL8j2MdLHzeghbSyOkVohIsRX7JxFCMn534e2EK3SQXYq2gmEx5QWFUpGXfFAynkO

LeeAY4a36a/E7f4QAefuC65uhTFyMukjGOys121AStH/QmWBDi1uOAyXwpqhKQYBEwRMN6rt1HtAYs7D

QmypM3lE9BtFlb6WutmxPHV3XSsey+EAH69vdn6lHo
2lL4KVa0jnM9uW3sG5JXDaUDsfM3Oykt6U3D3YFe1xMIZMHSb2Wji/VibLIYpnb9jl7lvFKvHHBCzekV

6Kpz43X4re/r+/3H7Ym5jv6GeRqN0mKi3AhRJ0i4DE6Cpc8FQgYV3RBnf/lWP/Pilje380hlckx5+6zX

LWQazLeIp0nGmlgiVz/vinc3CgjxPzFLDhpSbG6VbV
w3fCxfkqb7kX4Eg+4b8bDG8jJ+AeOEbwUH7MqlCRYB+OoPFmEak9klRauT73VjuPn9UQnoSBG3sKjZAo

5tekn0kgKJOtr1v1BlQdYJKeR8YwBDFRsFbDADyJwvXJB0mVS9lcPEc6xDjBTIPFRyC6sOJiYevZhctr

ZKBkFE8gQ8a1my+Vlu/qRa/7rXVT6+edBmWi8kcny8
Nx+AvhafzT8JeausEjnoEDg5OLL7I6Y+ogxY8wRkCZn5eX8bTIicAsMYuQv0Q5z1aWN1u01AHRyiCQ0p

+QRtYX4mKUt5w4rGevRgFtsTBn+8tw3lo3TBkNMQ6ZFQBN3/NeSdax8HINCnJqt79ERZVC8jwJu23uK6

MXBWjM8J8OdGWm18fqm1d3I6CUMmgZ88czEism610h
1cpU5ktF4mglgtD6T+ZZO1QUfSLCxh07tjyNs5IgPIXGQLOXD9bGrjWuJ9GLP6ZfQlDxHF0X5murvQe7

YeK3xD//eoWAsloX3srQkR7EADRC+iW42rJ7ynPkJH92CeTSzyiT95vspWWKwVnvIdYwgqUX1bz+OVRX

w2mD4JXhttqvDan9qNuB4GmJmQZ3j43nAsa7Vcere8
LlHRabza1t+8O+GyGB/u5ZE1GEvQDzybaGmmxuMEmhI3OcBsUErXImeCkX7aYKUy5emi5MqBfAZw/DEG

FcgnQR71WnqqA/Jl6IUt5zq74CpPOzEQJEF/oUBn6Os1y7IANUvn2sNVY4Frn85jh6BuCZwPL4UesSCy

kx+NmcZnL+miaz2xnQPBVVoIBFLRuc+u9MZf2dpiEb
j38YVWpcv2h35X/rvhsOtxlwOF07Z9ak7JQHQcBYn/NO0cxwgwrY8G+2BBsoW+Y4X2XHIMntgB8qP2LT

lyr68LVj/Ohb0C7saQC8jgxaWpWTsGg0Noa1pYH3hlfnTD1H5kptetw63wjCGZTb1x1y9neovVtjiKuF

EUM549Yp+pyDudU06ZUrgM5KCnFZmFGqoJP4Bc+Rka
Bi+cpXG5KhirXCHX/idP+zYjV6ezWPJIHNZN04QCSFWABq7sA0vLF19lzLJYNe6P2rPIrV2VPKxWVULz

NtJrwjmpOLJ9w1n+qyaex/BZk9syzxxhd+b+iU4dXX4vCf6WhLOnTqu6afHgxoltrUaj+AEAdhWnIjXx

trh1H8BiIqJxPeaKxt7vzBjSUKX6nHhVKd+1W3zS+D
fj/0EEBygC28fXxWrKIeedaI7RR4b91PX1Iwjd7yVklumVXN5jIWVbhAB9+oQt7k4iAjchBFoGn37svr

4OZpLjdL8ntcjEKNHXjZu/N2TWoB1qd/gzRt6J4vphb0EeAcUzFfNrh7SuixADg4Jn88soO/5T0eDOL3

KRvLc38AqBDlK4jaqTSuyIplzRguhhx7SxQYmUC8j/
hxUTAzEzojmDybB/a3xlWI/1C/nGBCAZ035q+lpgEh3hryUw5ylbrgvDxzOYZl5MVZZt9y00Pat3GSkX

CealFaX/aO8CMgAa9S/w9R8Ns/CSWjkSlfogxQ23TD25MaZ613gN/XDH7ElsXpd/GjhLnnQieQRwAkeH

E4ykmxe1dusbY0/1gPgFbEDX2npFs5aQtT8NMuksCD
WBt9ZXZVokS+qyzxPHGCCYBFodFrEwPBxcjGW8fYkVKEB1Yg4lK24UwE1NUQ7s03f1Xo+u1z1QTMWvcM

WHERnrlpIF4ij6ZKZQatmhZ2jZVqJxTmaT02aG9gDF+NLZ6yayLe1Cy7HO8XX5DhpO2fm/s95T0e6Kbb

2Ax5UqdKvb1+dHMI+NbItSVhCa4M5QX84+fUiZn85P
CtvH9M5xPcj+9bx9YqQFtSH5sSPBzP/OSp6ENw5FDdHgvcZ3HkKrIsKndO8tra1OasVK1JrydpJL0EeW

jM5b2/wK0pHOWqY343/aeD47+4BaNe/4Q7Q4edetzzpEkhOvlSznaKP+EnPkz7tzf3vN59XmYkXHavgd

wFHBz65H5UFKdHYUEOI9hgulBQ/WIpA0idx86PuUnT
3L4xjQ3ZSRLNOUMtqlQw/+eVWNMKCB8ydg6e88QwVDS2EHaWV3Eenn7h/vXVVQ2B6LRMR4tUxlUFSMER

JAOsqY7YofWn7gI9xRaKzhRC3QW9weNgoru1qiS7UmGHZMO2khFXmON2KPC+joqApBBE4PbQWb3CE0sj

6Jbd5ACyAbD/6C2B1ykahhQ22iSjkgA9NdkZtC9Uo2
itsf5M2rfBQeIceC2If/Jm6eeWJChIhAGuz5rj0MVkDj93TstooJNVf/8zjao4ntyLJaWFAqv/02S87+

KZeOwQvPWbsw+b1bilzgPJcH0TEyljyui3c7unztzqvtWjhNcJygbAe/FAKXXUSQWxGlWECrfaUHORfr

mPIRog7VV7c+PjzIydno7COSSRHu+qSkLBDW9J1rum
o1LQf0kfm5xOu3T42Gg1UTCqFUgArPnPVoXmMWfdoknwf+aUE2tqKNIrryJU5S4ppzn2FIAy6EchOwR+

AFCc8pz+4iAy/1/UN45miI+bBxpmXI506IvUutBEGne+fg/Jg6fUcm9/u1bJbK9+r6xJDTeEmPASIlJo

0a4AjqJPZ2gp2t7E8/SFJCzpBVvPfV+kx2Wx9CmN/E
jFAW+T+1e1YGgVwHDfbHOVcTEy3O2qvbWKndQ2JwBpYQokx4WaAxhnSU0+zwliVIE84RJ2sBaPSN4yBA

cENYuZwrCrFVA8j3jZJBPynSC+X7/gsSI/MMH6ewHEmT6KbFEZxOGzr9e4gi7KWvAO+2/PEjO8Ue4WoE

7E7MBUg+8+KV3JgPYXdqpuwltuwBFdkrGdagBX9HYm
OR/afiPyByyBhT6dHuw6fCo+1X879NMpdzBHwStmSl4E3O3LtwAuUfnsN/IM3buDYK0qqdbW/0NYtiea

0eERvAZ7VvLt+LUi1/OqSKRNlbCvEnQUGxNKhfSIPxMbmmffd949VNsDapvbAcZcnyqGC8TvymS0DDYy

6+yRt48eh+jWk/v8LnD2GT2C3YKrvhnN/Dlj3Sf8tP
1T1NSPuRUhugN8volC/9DKUinxJwKTnL2E9Hu6MhQnKPzoJOCh+uIE+HXqpcSKVa3MYgvUKhppxE+tpX

dox1cbRFsaHpmt1jZBxao5Q16mSHcp45rVhI3GfktK6LIA+0qyo91fAxsz3zX+vnrDvYv/1n+jqECn5r

iHRCHB7VAU7TA2AcHyCocFHVqLb8FqBTAV0lrxsPU4
+Np5rYyJRVcOVH2p9WphaL+QBmSjuK4Ubn7ONP21q+GJ0Ys+PFSvJUWOjbALgfCJHk/vjrYlMu386HLD

Mdy3cXQBPi1go1/uBTUJ6CTEz5glc20h+AXqU0PxDhqHTzZakRAU5X/Md5sdeKHZjQVYTylM+vpWboaF

GIiUDNY7QfLRzUhc6b2i0mYiorrtuG++ZR/NiA1Ir1
BL8Dl+ZEQnxPjse5lEYZdOwvxnXnVP/4XMuiFUVya1zvg6m38Ui1qaI5TbnxsnJaePIosVWYwPekETsg

I9CPJlMm9dUdDKepkr/gpttIoQPHs39SkEtfzSY2u4OL8Dm1WSx6v9B70eLpZ53zv9Z2BDyb7LaJopqo

ybODaeK29nZ4e1528AToDXEFhPmLpWW2E9PydXzazk
y4h1aVMCMR6x394UrqeHwrlRg0ojrQE0NPW2Odl5zl8BOti8zd9Gj/fco3kBn7tCYutXXps1zJheMtRh

adJldPsnmSsqdjwfaq5vMcQ/+dDNQZmPMaEWyfujQHKgp9Yqf6v2+kxoBpujwCL0s+epykeGNcBwRT7C

IsMss9scKtfn6GXeMNOjIH+bTZNMWJDWj54a2ag79f
IlooNwObA8sNfuOS8/QeIWfhtt68TnvlJgUPBd670T8Ezt/v6s2hOtgEmVwLAd/nr/HBvG+qA3z7XcT7

+rK9z6aUo1+B2yNaMu7/bL6VdL/WJCelygCPXm5PkuzsvfYh8Zy1kr4Yeu6to/NkDUiFrvyntQZ5fIxG

Zotx6QrVW5+K+4arAltOXogLlgURoyluWvTHybTz0l
T37r3f3b853vAhfDPBRI3yjcBAL597H2AIzDbzZpk57pMByeEyvTVxFp4orlRp7R7cgwdMMIms2+7IZT

AC/yn9DoeqOhQAHutRcFI6UU5Z7hzIeGiK49+FndK7piQsjRwqTGroGw3O7kMi+LHPomlXxGC2y+lbpY

PnDOm4I31PYaJQOyX+3YaZ8T1t+DcIRI6M9TXMK5mz
2akzdoEzgxu8HKH4eYIv2MSBNqb0Dqf/AHp42lcxjfjqpkJYKnpelAYyQkJyLVQkp4ZA/+eVQmdj/gTP

xyomOeS5xP7CTWpsPll1EBB+mAkzyg6OpJFu4xj0gnXWj4sjbBZOFdQdykhB1ZSXrDP/Bup+RG/7asrm

85m4BX+4Nr4YfQ+4k28CNJvM/gWUd4Q5ntj6jlkZAQ
YjuWbDCfbqCSf9nG4D/fyI7KJMGgef8ZtuQMQrBc1PYn6JqmyBQiJmk52+D7jcwm8Bqv4tSIt41cE3nl

rhjI/1nuTmDoRfrhlOzUYgw9NZQI/ELe39gl/CbQiJcjp0v0qnA0JWVaeeQFW8+3gtYb5D2bZ/[place]/VX

HdfxYefchfKA5NQSSAAgP3k2xY14AgFrp5OfeepVsl
LcgUqJyVh2kbjBs8SytMnKsghiF/d9VhtbL4eE+uZFmaQON9dIgK/Bt77vjU1QR1D7ArvGwii8vkicaR

ZRpPoquXlxYPrjyTtiPhH6+AaQSYyapmspAVfCYez9WeLsxNa7okxn1PzaEAYlpwUub2iwLojcl1i7B6

D9CXj0wEaIXXsSFPefi0mfichJVqdNccFfhbqZrRLS
y8jzszne215bvBhtdC/CunsmHQ15nMuV9wWYizg+pl2ZlxR9e9Sspq3fQkydQEHCXVfSxmYohHq27ZOz

CB6cgiN1l4C9OYAT85pvtaMmdQWNQECWe1BRpNH0n/foxMzM8WI7Nl4r7f15PLm+K4I7poYKzngaVu42

URw3CuJmypZtBIXPAXDS+iD0rxbochxj/I/VrJYvri
ajiUUd+XABVnbvzm85VZ5JQ4mH+vablwQrrG0V2IZZ4LMazBExTCpiE7XiBamr/kMyWB7Jo+UkOza8xw

FBZmDhOHb871rgbxjo6K/OCWcfx5ucJ6Dax33If/heRFAnKQxMDgDCTuOvz0875rkJj3czW7at/3V8dc

98vYa9xZWMcainq8Er+1ee7ocP+WpFe9RngFSS0yKU
RddrgNXcwchXhzKhp7IEifmRs+6ZU8BLC2jnH/XvgEhtK4yYGv2FpAakEgQAFE5yyiY8V95XyhEw87s0

w0IQUOpHEm4rk7RbyimMEoveS7d2qa1mSlj1R7RUt5KdKm+LuifDMIe5kpGwUXJ2IQC36vg50pf/e3Vy

cHg0j0PuSjrIRc9IDNM6Vn4GLW3zZj29yELeqmNKsW
nObyJzlbqJn+SluYuifv+vYU/jfMSeM00bgfDqn0AXbpes7UGIal4DvVeUbaKdHXn+3O0PzeXLrt/Ldc

OFckHAe7jVxRfJtYH0SziOT1pbbFHq2Ze6O7ipJeuPQLEw8OX6vobVt6x2LovPkMYNsTSnfBNrBYhK6i

7jlachBDy5P+gxUbIRPEQzW6RMH9QS+PGCyeS565rc
ghDIWSeYL39XduWwm1HpMio7cHQKG/fuGIBvc08Na35wPMQx4qESdXPRctRaVzqG1y3ZiFFrM7Z02cH1

IN8EOTy4cFRjM0WR5/I0/UkuULmJ5wQxoPZGLV/D+kZLhLs9y5ehbRxPBWJJG5RhDnNfjZ6k/nwOTiCM

QEUn0yCEDEJn/IPSK0WrUHBabTYbrrp827uEqaEtEe
2b3YKOhppm//AnxRvJX7OD6Or5ZEZxLzOmaTrpM9v+wVeq2SXqpv23YvMM26lyl49hGMIb977QF+ec7I

QfUIOSre0al1700ry34oEY4oyDBQqsu4xM0wfCL5a3NI+ONTWv16kzRi4HyJkxWSUzstMSTQd9qMs2RH

z3upp2WJpQtwwUCuaGZtoyA0GVHFeKK/HZbPQrdwWb
wi597BP+qYttNrLHBIwardWKnKIAZhLl+1Oya3qvajawvYOK2HP3NH0l9sTGehYx733MX8IyKrGZiMEn

NIHPjUsOovGk9gpaeInI7qTS0VylUuwwk4yavgj6HvhJau25dwqGPZpDiYZRxDyVvtVydnryzlnkvKHs

BJ6R7a6f56tq/6BDJKM13xPd8Fk/P6QdZxuV2haTE/
p6nBlvnSD1OYKrg2wc1KyGFglKl4Glrxb/W1Rr2QPUo0T4MXLACgA/8N1r5j4YejIE1yzJVPSpiJS44u

HQ6cYy0eulB5nML2suKzGwyV8HbxjZkL68Dszz1mEQFig3m8549oIUKrCBUrV0CDHf2GmFyfKmfL2kKq

lB7lgin7FOWiJ+jFNThRjcyE/vLtTNI5b+2XoKpMJR
Te3giB6g+EGO8Mmabh/kN/Dfqb/0A8dt3wle+oYPukdphaPio5oE1ar559PxG+cwG4MZhQCin3bsJvqP

RSdp1I99kNmUnsEYbC4TNOv4Cv9AfeGhB/BxbIeSE360j/J7st4R9kKdo1//fkYq0l7hxNfpaIrVCMV6

3ktAgY8u1Nth7R33QJzB9XNsDXNJIxah6B8+tZOz+/
qLEYcqw8kFd6+PTSI2PR9Q1UWUE8f93iaDH2M3/wDY/CNbg4MGmHvZ2T+wpL0xOpUf/5qnML2cCeeI4s

5y89CG43T72qtu4+z7fdQ5WpOxwtM2RlZ+O2gKQIL23d/+Ov51AEYmEd6FODSTGFd1zmz7RNskUlfOci

eVRcVr0eO/ulNq9RR6RtQRYxZniErXGUVcq9xjqqza
swsol96+7vTkZuKMo/KJ79gNHvHvWRZ8bxkAwimaMPW/zb5nC3ihkD9Q35LGz7Yktb+6c/TesUXQhrWo

rO5wG6bfYVu76SLitZIbnXqXyyo5B760cLGHQNrh8LFmmHAyeENXzcaYXMdOpX69b0zn0qsSIM9XyAxj

khFG52JwzCh7lviSB3Ep3CL0N1zxybflSuVY1K3i/1
omsi6jauIW8ME6tKPf3UwpHN0uE9MNOsHOdZEwNlXd+IfXTel95trqmvRgen577j6hdym2nLUGgom4Yh

0hwgoJ6cLO1wVmB+QjRCkTB1y+rDDN3gNmRbA1/10YLcAenHgKmxnHWzSj2+PcX08xGXbIN9Az+HSgCj

NzC3rLN+odqrktYKPo02NOVADRBw1Yv0TokEOhrsE8
+TBmGZeJDPLYKef8d3vsUHpJl3yddWH27Zs3SFOyQLmDes6H1w9duqOnnjiTb+7SSUW7NDeT7SO4k1ec

sSAgybjyEE4cFCcgBhW8IRuPnB22D690JnG5EZfnS255aWmpdt9pwmmmTY7yI7uY7QxM45kKHqxqGk+m

Nbw0Gbua0i5K1243JPizTAKESb39VZzanpBjbOB8QY
rs881d44RJ5XbfZOpB4wq6/i/s/nRNTbk5t35TNL7i4Nr2pLQ5Upq1IqtyKpjIqzhIie+plMcjwNRNSH

fMJJC/KuKtYaNC2kOzr+OjJdET/nriJnnUb9u/Gy+SEsAyRLseyqthaL3YU54FqWLGkbQggpXGj+hW2w

R7+FixGG3idd5xHmB0Zn4OLfh0CYQuAyahLsVLvAtH
aCd3unqg9kSmyuqtLNClKW8Fg3EoBSBiHBy1Np5CNbst4qz3gVydFP9ngTvxh8wp8LB+yZ8bhgzWL+xP

4E2xOsA5ONavdycbVFxeaej401PyoYOaUgl3/IqNnhQ60Plh3WK4XmTSJs8x5I1XwhTVSLso+lzoQDdw

WS6FK2cX79HtGnhohTms3fJy/qIMUqpD6p+0+AGN7f
P4Xf6+efXTJB+r3pdezy96Lq/MLHAVFLEoy0u0qFOr31Z2yFBELyK/JC4bVKGlD+90//8WAIPgrR1xji

WIDkiOOrkoO3af63QEFYjOrKTbim7C5nngBPlxJBmiHjq2Y4jNV7h5TzGv6PfJs1CqIVqBe8Xh6ejDDy

9QO7qvHR+m32CK8a8yFQ04dqv8SPGRCb/rcvZU0RyU
/xVjMosNzNLNMic3bspLK2Z7zXE5qQEEKhLGJ079r5+7grHMq8Ro2qHL+8e5Gz8bptmc0Xil/RWnWkDr

Rpx+B5DsZ1x9j9Nq7XTAiA9Hov1xTJYS5FSjifGnI37IuctHxG2VnZEgDwA0x3yyKueJPaZDIb7IgK0M

XRVV3WCqaplthaN6NMbgun2wXZhd2y7mPCesnokVSb
QImG6Dr8BPlJ8wbEcKEEdhhWAfqsPmCXG1qVhPNCHv9bzozkBdVjLdQ04pQF6Gr+cKXAHR1ijEPRRr0I

ykKpfryjCMCAaMtaCrWIHnxPDNw3JG6RpVpLCJXMZB5QjTfYDAMnYXmswne8W6uk/2ZV/sOrmWwukV5U

VXn/6d/6nu/g7J50/qNZFU6sgagRTa9v7Cn/ZCs/gx
E1mawkKbWYAevSC5h2chKu0TwcASk7Tt3Ue+GyX4kipOS4gxAkGuoq9tFNHgS2vc50hT+sKoy9uTBZj8

pb4hRkMMJcxnOFwrP3C6ZJx8EstI6YlnrLQri1HqMhs+SjWMgC/bbkTFqLzNIWK6mvjtvfnsa/fSOLI/

PuAkW7sXACTlcZhNsyJurTJRIBGsb5Bq+UW8K6jwS5
iSmMhVuFpXNJDJtneaz3jtaH7txwu4CMji1y60rL0TbW2lcusP/qYWfy2iRH05JABqxUw3UFE+IRJMsI

y12xT/puuiK6qqf+NrVe6LmWjOzKbMBNbXh+i8F8gXCfxeoHF+z+xUjISi74uou/IvUQIwUctycPrbhV

OxxVS1AoQR2g+sA/XrmhQKWl5XMbTM+7WWlcQVd9bj
DPE/zqrcdWWGCJMJBB3YXfjS/C4NBSTHT90CLiojDXVnJikOzYJ3dzkALMPzSz4Irdl+WRpd4IKoigJ7

wNYf97xeyZnR/7WBtbJ9ofxwuGmO3Wh7ygliXheQqgSjIf2aYLc5PSNwq2HD674lAHTsw96aVVj2TIDe

d0nz1GCapJdpQg4E3AsyelOsbW+NYYv6whKgFJb5F5
RowX9Jy9NByA0NfkMD9bmNT06Y/q6Gv0nBXMxDJ3q6LaT3JjE5zqOXxKjTPX1OOUoQ8IZrJXm1ZJquYK

n2GcOo8oEMJuxt7UVT+wwRfa1l0fbAndFOgblBrrt7XtCDg91rNbVjjsuZTdGG34Vu/HIWLR0N6bo26I

IBHPIrKSZ95u6DWDMbMRcpO2T3oL/ZpEzHhmooMS9W
K2gv375Miy4zs8mbVfmzX2KxPSsIgw/reX1yDjuHpel88I2pSv534a8TmDzf/zr881LeIlJHaxokvIyt

qT96izJpiSwJM9n54OAv5qG6wAixovJuUKUsa2/zRI1L0S1XI6X9nT4iefdxEvphjap7h0E92lRhBWuz

Q7cotHvky33PEEZBJk9B+Qgmqwe/rwluQ/aY/ybJkq
3AqJXBO1TX3HyrR1wqm44FIKYYXt4hmMDUSBACczZmfCx+mItKHMnnBfxu87ED75yQLOl6jpzCP8WyNc

9vjwUhF60KQJOFK89t9WnrAQzubXyPOf4MQg5PvaKndJiGFtBQ/vM3JVI3EWMicgS0HJFl/DLunJjtFk

38pvxuf+YUJdkgwPAgTXmNHce3P6CEtDBNSZ9cA77O
BF9RqSdlk/yNAVg0RV7XVazi7ZxlChN1LXt9omMLFymmARnX4z1ywAVpgpTQ5T9xJnLJXKOUVj7HBi7I

0+8l+C7EFpNRxf2i22IKzYYVRpUzp/fh+8cekaWKwkcq04xoXBNS+ejO0h+R75Gf58BMY31rMQxPbN0N

pUVFUCUoiILtIQ23EpgxSFHUWgUEIETpfUELPdv5DO
R0VxxjWIWdMl/IgxM/ysO9vPB/Ho+duurY8NWbr/M9gCZvEjYjUaWM6ZGhtOHopCT5g+rkFn4ctanlaY

yq3wDWoJTILNWkn4alJRd7liUKUPMEMADW852zCGigu/znEOmp2JxgYC3Qbt+5LE8tfPjNZ0a4GRHqKm

10zF/MR8h0UX8E6i/WJj3U/MVvlAvesmrTpvhoDxfo
XWaUNl+oOTvg+RBDlTmtlzyuK/vxA5zUvz0T8GhMuKBpGIrLzwkB/t6RCUX8BIMEjvXyfPOlSvmVlFSI

RMhYr8zNmtyRV3jsb/lMCGdsGs3VGBg3kw0/1gMLGV6p31Ch8y+ySP33D6ZZieEB3tNxx+LwHqlVj3rG

y9A6Gw8Dqwr9tRgyl29p+xa7ONPtFwkqkn67VKwu7j
YnlQ5DiALT8k7MIXfMRmGlfX3nn/AfOdWUNuXYhTyx/FYEsNXxZTgTeTyPsYthR8/sxqRVyV+klVDGug

S7LuIl2KVwL1e8+tbKYIvumrvzLgSN/3C4Kq10QQdihfJ6hcbq86N3YQTXWlbGYXflILAu/bjorjAAuA

tnmB07amdmteeQkdUI9ZX28vjRAXxRn4OGhp/7VFqe
x5ZSWAcDSkCsV3ulhV5sMxk+0qNTUjBRFI5iI2chPFr34ayl1ogep/ZvDL0Ln1y3hUkN0YfePBxKD9u4

ZoKfJ5j+eOqXCHvysdtyHyOTj9sYz1BBv8yYCY8yXIZl0kvu4a8hYztmNE1eDulpgGPyrITRWanEMEfK

eM26l5u5WZrZ1h94B+rmx9LIjV6LuEh9PePZxgw1Np
tPygdIsmCk5016RMmu7u3aQnNfm2JkNV34eXAAR6UA7QU5V1TK8FM1ZvAV+OLn9rtymK/8pub0yNNQ/G

9TWl2yj1bmnfDgLs/zwlaI38IoLfkCe31tW+2eMDUv11AtViFlNxRKB6ue5W/PWH1R+RT9Oiia5BszpE

ORMW1gtO6KZlG9aDbQ5LJmXYiArmn66REmaIVmhs1D
WIf2CvClzQ4y8RPQUqmx+/bEikt4jRmztGkg9JllwF0hmiKYY7iAaNriM2uS1CojmbK7aRfL3R+f7/NE

1XkXDZT9crozkT5duGWUbGPU46YofkSCSIC8egkfmCIeaN6TuEc2Npn6rWaUCSQZRPVIUOPlLUoa+bVG

NytKRK+AIYYgIZ/3Nw+r8sUzurirOAk08unpzGpwQH
WTs1c580zap0VgpxfqaQw/jZ+ptMXtDhg+sJJ0b9TUgTdf2W43Qytpz60tGW7ztjJZ4+MZ+qR3hlZ4Oz

ji4o4Ys9mUY9znPffbhCGOz3XVNVA21F0Sn9iq0lyx5vQOu31ezOqaJe91zUaJ+OfamTRzKvT7eP/Yi2

ujvk40nK/QNqfamqet8lXA8d08QRRGY4JrybqulBft
iJoEplrB8IyPWllckcBL5syokaz4XMpLWmVUdt/ZKKOzEPQCGw/tuMd6no1uXeaCZfQIZDwWdJWhge2U

oEN4AwbsVesCJvX7p6lxjF/VKzrXfkzKScb2l1Reiv2Uhij6uRnMdXkHlEAbRSq3uhjoAzNPX42CoGup

qWni57MMdj68r5h2R7O6WALqR9a+2Ylj+tFChqOftR
enqRM+o9q8UBKEvMqSzOJiqSuh6lUMg+GdEJAlL/4g94BN5pP5kHZQH4OxLG/wgZuqqRD5e4QITj3vSC

pa8PWlE6cm7KJg18rqDB00/2bdynBZQrDbtQ40Iz8UyMip4bu9Xc3TU73c0S+9Rn+o2Ivz+FnmlsU9IW

0NCdTI35w9CsvDC3+bb0LwMDaqP4i4GCuZXrFWBolP
UDLijoi8+CgQqI8a4cOkwut+ug+orMbEHw7Upy1tiQ0quN29nf1XcnuphzIXRp3pr7h5+Pm1bdQiBg+l

yoWunalcred5Zd5KUHeCnJIt1uw9IKIiEPNaC/UzR8+vn+7Wdhwk03poKJbRfq6n85bFrBRkMlkri6FA

sAWQyohTbcOBwEkBJ9y3LFxcortbyNCGdwjMakcxZZ
oeJbtHN348pI3hWP6X/csHbEb5S+O63be7UzXNQJGRb8aXn02LdrfaMQn7wjlhtUb6G2XKEBeklOUm1h

aPwIX/tP2l+CZtN70WrTukmAR61ixubfAJ9NTFs7NWHwiR3kuj9ZPb2J77vwU/7CYfTdBvm3tFNvoEcC

rGP/2x1FmclQTI84XU7a68rn5R9xt7leEBNjO5WjRu
HOo6h7gXwv3vlbvJdmIa9sHGdkrhDk1EdadKuohdf8DSRl1BUOiyvATfsR5y5FNh+lfELjfXYGVaGgHC

AKJU1IKvx11N6qFjrrCMfE5Rx8FjDY8g3foT8rWnjMBc57Q//cAH7X1FsBGsGsSYqJyrQBR+9ptnWEZg

42+YP5Ziaut9qId6vLuWevxcwnknzou0+kF3kAY/tw
tynG305sMv5S4NcQcuI7aBwJcksqJ/jYgqZW2dDoxHvrjdjn1ktvcOcf6SPLeUeDFfRZoBw68GvLObga

wE20vUAiEjqhmXI+Vu4Zb8U9h2sZ7J0MZ+fxWXQr1x/qgxKEN4BQb55D3YxMVHn7LpOcTb8yzD9dIyNW

+Lel9bwwfCruv17b4AoPcffbb9+pf5bsTsKpNoHgZt
C16RUFmu6HZh84K3syM3n7LdVJzNQYiBY6x+0hfq7B6QcwLOSuxeXQS1T0Tnlq2ngfCk+QyCUXxveKhW

rgN+/m2mvY4d+NYvxjBaRAjgeZgwFfCvvw9GBesZaxlZz7TehMrnXuTmgvTl5nOJOlX13bFxwby4WaK/

GCjA1rpjm0GTRphBSP+h6ecqhQvKpIo7w+EbAzUfpe
dcI6nN8aWer6rFJxffoUfG3oEL/AFutVjBG+YsKiCIThCJCmklrDSTd6NT4uUaeR8yrHyJ7LbnB9NE6t

t43+HPI8tt/qEkpaTCESkfaKUlrAyZwlORpP3qcNBPbOO1rnGpHmodqNLW9CP4fAY2SSzeb1SGjLix5u

5q0jD7fueRO1yiqEkOmysk4huRep1Hy2FNt0E+x1q7
gHOatzaHOFgzYJLEhO4HJ9ka7pRX2fp0Hg+L8aHir3LBclEqjMIq7HO3PHww/HUskX6hXJsIqV6tj8Qg

7K9Pk80k8SAxZVqm85dwCQkrZgGTvkvHGlOAl9aT4kwNi+BWmKxAmVTtuxN7m8AeQuI19gbrtDhtIPDg

WMCRZtMBenau/CFvnlbkShW4aJ/hHG0nYaEpKlcE0C
cGlvWuc+daKPdtLEP7h4QbSmfrF8eaQPG3FPm2LDTGhwZHM+Rl+55XPe3Da18l15l056hVm3SCEHi/3h

v9SynzoVqd7V0wicfsKrmTFrBYTmhxwD9E4qGtApNei6rHbcVAGxRZyxf/ubUb//JG7sOW+rAO5dKmtm

sr2NXCNUiY/RSjDt851el+ATJx26HRH50IRnjWhXmP
xYquD44VG0okFyCoWyMNIETFiJbIdq6NBzUkDVRcBdsG+Xo527PpoiOPOL1/2U6ml+WsO5Mn+wj+svkh

NpvCEGAXIf3aoyRzvJc4x5hoVtKo+Wj0ZAP/W3sSlumDQmFQdnuZU6yD7GGUWYmissoPeke0+/TvsquP

xuDoYOuxmgm+6jDSjw1h4UnvRZufb2Cr5yiM5sddOZ
dXY9lzXCFy9dFS1Bkwd3WttQM/OyeRKGPbhpQRYMTpSfo+ff8yOdbTQHbVencczsLn87zAhwHMg5p/0N

E9f5q+FZ+GzX/n8JLpUHbiaYahhdHyCRLTLsU4jUdHr13/Lu7V/HaPjlUnKyCGFz04eAGAThKZj2BLys

2oLKAuYXiSFGKPHpxPG97H6sFLOA9TXiaXkqywNNrg
vv0+xOpBiqdVZi/0JKQmEkR6B8uJUvuW4OnhH+BBMjtISdty5CO4b1//NcNVWT8nPyH+YrJn7UI+ZobV

/JYk2G8Se9p1EvMibdsbTuVPGDHG/poVRVLCNuxYd+5MlmrMS/2dpzyBbygn9Y1etp7oR8Rlug0h7aSu

5U8UIgqhX76LlF+2o2uk4lKg2Dnt37m471T6WxNOcl
h123Or2QWYPR8HcTCUUqRSPpPfhwEYXzTDYZgncYm1UTjvfzLkwDWdy5mVFDyh0A7OWnzJSRwLDSlQqN

iXxSx396jrCm59DD0eXlzDUamqiKFInvBys8GzmuQDECBgvp/rZzc/EEv031r6RHOBVd24xaqOCbROl9

0nYzbssxzLIo21zpWwowza4whcvTqgJwZ8RdTDaaJy
F1PqXWcBW6wtmNGpbnfLFZGmVGiC0+6Z7CVavK7VirOJ6/1Zq2KVIkoxx59B2BJ38GACd0sh2ZsHtM9n

tHjrgKko+PaaZAzut/jrHSjBQi0OxN1GOG0oDZYo8tp6D67n1AuRe+x3/yvhp2xVklvjuek0e/rJC/v1

8189ovQqcamP5Mp9eTrjQMMgpDQypYW0xT2Hlb0XTv
9Gg0DGfJ4nXmLCd6Sk7eYqo6rJ1zdpvJbQZOI0z4ZH9jT7MYAdmTikyCH5icBjqCJMuWesPM2iXH7dg3

gC5VDCj9G2R1qk1g2Sl4PuZ4wSm+7LeOPYnnzHvwhgS41r6OJhec5oHj1Iu/27D+8c2QditXNaRodpfl

c/IYvOMhPQzUVYEVqQqynalcagTAjhZ47Khn5ZpAru
RCYdvk6k04RdJdRrpWexLpcQ6xICkka8CPvh/OjbCRVB0QyHOaFY73+JBdwY/Cj+fWrQE3keFJQAryrW

JSyb/4tRTiT899yPOy7OmITjHQiaGeM3c92WyW9/CDq/2R1/a5iZiBzUWcLkKl5uKM7r5hau6zofJrqK

+oLshQw3fXE33X0o4hbOgMkmUWVVtk8yzzj+/bTYVe
2gzIlWO9874/JkSB0QAj8Lgn4qh5+8k6Z23zH89u8yUMri8dIJfTpGHWAPEb4ho5M6ncsE44vQgvtvWc

oP9UaFBahS/HmhdIq3DWzo03ADz/mRaejEBTELUR5f9N52yfg4Ht1FG3paoteaAB+fKK7cxyii3viO/r

9hVjHP4ARzARKXNg9YZgti0n1yl/FncQMfsuGE3q+Z
9lU2FlJqgfWaWiK+CjCcTNHD+GXKKPcV67YCDDvvbc++ML2sZXRPg7pyfBVbkmcWJSd1LId+rRUthxj8

QY1+xi6AUKsinaY4IHNTQXvSAzv6vCgnHdwq60R0zgRbutcbHgZGS8ovFc7AhEMA7qZ6cQuhvLUXDt00

YqsXlpWrpIY6oGGlNq9s5Uqi4Sb559Vj3Xh0JfiOvL
nTwThwEHLPm2HFPC59VkrpGjRIKDLcLyu+aqzaLxDiEoFWm4v0Zmby84N9lVILfnqD9J8kY2zPUrF/hm

QU8raAFrdjNfIesz5D/viaeQpT//33uf+E15dObRS2AgrYztkI7l4ipf2dlIkUJSs8rCoBFpg63ErAS+

3Tb89Nb6Dfp5knRWzg3H5WrXH27rpxYgyNuqXyT6Re
mkOH/Gk+3PjSolxfSIhi6ZVZb86zdL9LFkYEyBb3BfT7HTHxon2bhwX7OSaFdpxt6gBKHfPksNC9ZCSI

xxgslecHkVkF9wZO7tNh9b1r1XMh4dTntt86zTiky4jofB+kwBmfSd+bfFMugV0n9v7KX0D224VoXsw8

SOJSlr8fmCphXTmd3aatRLZDJ1wCIuhO82RxeAVT6r
Ldr++dNvZxufl761R43IRLYrjSjcm/iqFM2T91wu9BnMh5utRLaufun9SAxP1FUQZIMQtnnBOkxKNEig

aI4H2OjfGSTCq/hq7uQELU67zR6Z+1uNgRE+71BaeBnY0MvL28dg2eoC9YyJpxHnivraYVzBvfByTjhV

heUty4u9NpEcd05Ty2xSU1BfRmM3H4m3jR9qjySZyN
GLEP3/KduXVY1EQ67OeFlXcHKbOnykG7ppMTICBBQ3jh4/CVSEeGTTa6h8CrzGu2u2ZmZdm3/PuR+E5k

OE73064yg0uX7bLy1JaIfr4y1wqo54tARl5mgJvSwjKuLNp+86VHZRwAH/C+3O/OHQ8golKeSF+n+CBZ

lXWpQr+7BamMeKUjUeF+v3UgIVNK1d7TV3+Bi5bZ7g
NFv18giKCJZPTfgOYnlUWX4EUT3KlGNvbzwT0aAoJq71WB+/RvpR0+DWMFGLu19abKEtt9xy9799/z6z

mzmrtEx20OHZz1Z32W0x8EGT6GfkARSD0I9IiNKjPvQ2VbmYh4swIZwt2h8mdvWWk6X00WTzfHSzXWfL

W3yR0f2HriD1T1Bxj+JGMrOk9jAqVnymxCTFOuYKNs
B3O7+XcckwJeR0AnwFBXMQk8yYCBX7Jr1F8eYoYm39S4RlNKaa+INW64ZXqlf7MwLvzKr/qqxxeWbIZH

CHDPcOPZydT8qdrAnAVMUNmIXv1mjeNb1+zWJvJN85jDn7SAI/YeRUlo76hxWPVZn3lPc9SOvjOYw+8B

X28kq/PILbDNfYVrjG/2H0G/Edward+DNZvErn00Gp9iE1
E2i5wH0IVWOkb4QmIAlIG/ii9FsgYYcqO96ahupml6dWsodqQVjNn5NGjT29Oo9gKFqptFtQbX9cCZGl

YdE3SYL1PNg4UlN7e5/IjbvAcgp9CNwptF8TwJSctKaX1c7CyvR0CyPdeN1GZrywl9cTMFmO7uso4sy4

SqwLWa0ek6WCINX5L9/Ix7Fdv1bJg5TR6L73XNBmWa
SKYZ4EpChlLhnNuZ5RBlBD2txs8C63RVerCJhxqcDO9lvShq1kdeUxynaKMYELoddfIURUGLdmdwNu++

Z/KmCizD8rM9vTW9titQPh7KFenn6XPBif3YnbWFsoihFx2i4xPXolg4C1Hw9fIPH3bhbWYg8Grtz929

e1+wSvGswPgouJERs8N+MwxSctzMB6g6kzy7yZZV12
L5ve/9fFUMCk0zXLcRSiAjzIOYPx7srK8Iv6kbea71uLUXMpw7JTotSiLJVh9AQ4cad620Ol/cACSqf9

v8Lb2Pc1ZftRUIBTaZZR0DfLRNsbsVSoPFSXhVWcnWIMWmchSuJemCduhJhW8j5SkDj/ZRfiMoH0doe3

Do0jEQRNy3rEXbM+3ZfgD+1VjLgau6zxKlfvJXDn9c
R0A53fyCmA6y28XoZSfgtQLqtzMg66dj3dUn4APvmk90zKsLMT4E6CwTgNVJDA6df7lg1MDfdxuX2ayP

XglR7D5MtlPBi9BTblp6MUgzoC+IwgSRbXo7ilVod/N8qnMw44uJQiks/SmhdxyjRoGmrne4vY2DOHWh

dbHYBKnClHdE69a8Bln628CVivBmsDTEfF/KMAHmXD
vwb61BM+IXmVjzHb5Bxjd1pHM73vZsUo3K1eqqWChDy1DXVcN0u6G5dJYB/OcZ7bPkXqItnNFWy0YN1m

/T6MUh5CSdflPzVZInGRkYnQKjl80PW1rSWpMa2zg+arbfzmu1WZmmX0//cbwED3W7AJQR7yXyvb29LD

5yBY/qnF2JPybTJd/m/TL3b9IEMo+3LpXUJ7+cBeL2
Ge/IgDY7iIoQIrUkl+T3SQEYMTvmK+b8h57xhBgbBU4DVHVa4eEmVwALeSJRr7rqQdeK0QQMpkQgKo4j

0Fz+WcRcAcTccZoQtBUkmvspOezB1nPw2ifBPLXdzltQ3tUHT5sHjZzG9f5leW71bvyJH2d9L+inFTNE

ZMosd7Xv4thmymG7soVuLjU172GprKtrBvYgfXCGpk
PmGz47mQxzi7+tV1s/vqDpRF0DEszSHiFQDN8hSYj8zxN2BsGSI8ns0NzUZAyh9gtMuR7Us2r6WLa2E6

fX+SX7qpdxNQBHq2PJ7ack4nzgphGmG4rWD8KNL/h5lWrmaxTOzgP0Aq2hn2YzXRkVLKX0xgSapAB2jQ

bDnVr9bDTF9UqLCnVuOsehstkh2t/9NPf8ocO5b08f
52Fau5MF9DzLO6NVmFOUz7ojS/oe/jjPkeiw84b8En6xuL7sc02T9kAK/8ayKnottWwv/crqzfaITmvr

w74JABDvWbttazQR38ddua/w9lsOWWeFEvbru/Ejaqst+aLbnzTXx0fJQMesOBBpFcpDd5yMGVD8cZ5j

u8KcRrm42d5+gcFOZXEPJUljuITNg48eZ8IZOBgT5q
TtBX1y0wtQARnUcaLEQaTG+hNLzLXK7Dc4KfDX1iBTcKFqemwLT4QEIsLyRkf9b0WMWWQ//8bjwDm3Sr

/gSt5weCNM12/n450y4I8as9zqP9MomIAoiRep3t/VW0ujzb8i+i6HJ/viDF/IgO0IHt6q2jrrO/F0B7

86WZWESMM0adJBOj62FyAdfcw8C+zMx1ZugYAfpiG6
aG6nI/K3+0u4lGWIBMDPwYwKoOlfRJo+1+hHMPf5x/FX1nXiUzuamUk/JVxIG0YYgGbZviSr8aIRidz1

fB9GzhI2WKx1vQqZvo83snRejdMkdfGvQpUfY/q9SwE5+kZ69wxFFagD1vx/TswgiVJ6gKrgbHDohGJa

yHx8HxEtMYXyUE6tpbjPQuR5DL08WphKteVDwkZSWV
iA0n2GxDx+V6uiKCPTNPDGidIVGIEzpnw7ekPx+1JT4k0W1K/Hs9wdWWze2SpN+5X7v/GVVOTz88UaA+

IaMNcFPUTe2ouqk0onnozWmiiqao1BLij667ofISsSqVOvzXUYoohetr8PppptMtq//fAojjCCDZcrdH

jYBtKxjS2F6xCLVSvvmjudP7d3RqvjmHFAjXw/YmO9
69hW+MRKBVklBi7oyke4LIzUv1MYzFngoLjoo4WYs2YjF39Z0MIn/YLGkv0vGtWJjr/S0JlR3CnZqihF

t0QK3d7O6bU34aOgCPyFLEOZuHyUZWMm8F13Dvvjxne8JNgix6nB+kcuwa28oI94jWtROdf+R5pjvU1V

pir2DwhfvN9lP0Si9z/AQ9psKHXqprBTbz8cnA9cUq
TQt+Kt2c7OAoFM+zZJIbZvhn066UdojtkcKobFVT6R7DIfRnNyGeKEOHKEC94qhwhfLF5zokfruKbLhe

RREuLQe1rnR9CUkHCYDhKHLn5jC0pdtr+Y0kGgHq6h6JztRle4t7qGJLZZsvALhnrdIIg1bxgZ40dKzK

Gq3EHvkP2yFf0AJ6wsclKb0iu13usNf4XY8cHpa15O
Urfn/gIuBfsBXZ93O0dKWgDUa47Z4Kjyv5SskPMO1OT7F3bB0q33F2KWzAJmGzbTgWpvSG4Y1njXkWgH

EH86xPZuLonTksHVP6KTCOdzI4g/D5oGBaNIuuTvJs/0Zs0LrEVXuIULUZLoJ2XtMdOa2kX16+cf40fn

dPp8MWCURxrgNvtzk5cMF4NzdTzmIUeHVgYJQY7Ohf
xe21mtsuaLC3YcteV4daZ0MNl7KEiADBvL1wTAGNtromt+1FpUVXmPUtJfXIn2n+cmojMA0eUwWrOBVT

Htu464MqDHN3srHRpOCy4Y8nRM6j3V52825vVf0/SjMdK/XaikgubRfXFg11EqfJ0AzD1sBablXRD2hc

+FH1BK2BDNa8h5cxiH3VYJeJnOhpMSnPp4PzEOqgsA
OU53orvcqSinUnzMI5GxXYIJLBkEfC+X+FwLjyFq5eUzMbkGuYmUWr8dzdZul9/BL592MESILHHl8lnS

J9ua7tymviTjGqmulGs9YTSq41U0qu6S5DvvoBkU5ToqSr+mxm4xM9cJFGqIKu9xB3ycpAnlBV2tTYU0

YJf0aWGUGbmgr6ZqJ/tL1b3SKM9Yrl5DJMk7z8Map3
6uoCZwoXcj0yHPuLkLmuuQWbMlwwMo/Agb4oUfYJv/3EJDxoeSHoOsp3+fmlzNg5c5GddaI+zIG1aXbL

S9e1TIByMiE2X2Mc4JYdf1ezfP2bj+p/pHh1j+eGZSDxf1kXgfTA3TMlQDfTCR8izi5v6aPYzpxBqK0w

voXxzGryjIfVcmhO5vKY+OOB73/55J53KCnY9K1w0b
nuhbbe/c9qSQ9NYE2eigQLuvthWPor9JNAzk3oCErHFKHMYYsQXU1BesRQTBaCC1p71kCZ+Amo6YAFxL

vcmdDu0GXS+fmGbVaRToZ126fBvnYdBDtdTgpF51v9BQiY+GzjBq+elT/EsNexHmiT39mCMr8+St6qC5

O4TLTSmFhOz2Wkjq8Yp6shTT7IdjgSlfS3Qekgwo1z
CbP1jc9ppqZEKYysnx2QNgY4WcvCA+0iz9YpmKlnXTRHU3Hx7xI/38DkQ6Ex2SOxYVGkgwrkMjL/4nd9

2TgufOumSZo761DhxBKxvYBFvj71Zd9DqV4mLzaNgxTKuxO93EeUyGvIoCLIVuNJHn47fJK3sqGV/ldj

3JvI0mwRcL0Ec6KU7605EivUICuf2sHqb7wQYhLNL8
tUNNod8v7EUY7BVEz+mKH0ZpF3ZzcfREueggNveuBTNhlrTMbzmVKJPhrAhj8u/6uBaHxTA86V7QixLU

B+sjTYfzDBdSgWWD0lcjSi9JhMG4436HrHJiQDxpXYBAYKaGvN2g2T9+LSw3vMXDD9zgGccK/L7zirgf

wS7WDsuyTYBuPSd661riYeLUILc1I90mx3EWhtbG0w
D9NChoob8N9P0R902DN/L8rt5ETzxlfi/s+XLzoE3en6adud8kKujpndmechHCKjWeY/nvFsUcHqTQFk

VfPu+bP7KrSlKSzzQ8qVVucLwVwKQ61mkjloFAdLMOCb2E/ewID8oIh5lro0WBzCSDugSbEgiiSPat3I

/YBFsevqB9/19/g55AELEDe7zX2MJMPKTuC2kON7qE
JqfGkxuU+f8BWMkmWUwJNxMuz4JqGJvb+7PkX+si539YQ8DpH23IRQHDFEq17aIORiNzPOo6WrYRUyyD

88DjGynxjzx5bzCu2ZRnxxkKVBZCuZTxasKxGzypjKrl0yFLPvIbKSKRZbCDyZBGeZw+HvBEtiasmIhm

dJ6uWZcJTsf0NeZVUoN8p2IseDpGYJjR146OaJfjzs
VUGSYZ/DCg0ef2cWOmYrJF+MyvBjv3ekWyi1eTyaPAl/VmO+F9lA3oBJ6Ghh2ROJBf0mN1NryZfO9KCH

3PRZLjayfwrQ+LjfWsvlx+Zir6YwgiUf7vUWoE2BQCnyl+tnhQjRZwmbraBkHskzQpE9mpbGL0bXC6kz

3PDoN9o2oeQOtbeRwjGwXgSpcZcYnzO5oLdfwbQfXv
tFe/VohyJyHqVN6pW5Jze1nalFEpwJc0ZPVG5vJM7UKg4nfz2VpGL/ZxJ8siPmotwWoLVD4SoD1V5JSa

sh7FXBE+CH/kvJwriA9iIiZuA+PXZFqU/g+osqvv6YSoNp4XCf13KVMiJM5kaBNU1/bGO/ZJiqXzRlLj

OB4gSgKiuElXW36YWNCljAUvDeFGTjGst7MW4fuw4+
Bg5wdwRE6W7PtQUkN3P1oJsPM1K/MgwHY3MINY050ikQXcqkM4bdWkFaHoP86NdGxxuc/XfyP4/IoR1H

p5KhPUTuhlqLCKxlVURJN8yrGplsDn/zvzEaGh4v/76OCZSNdgbTKwTpkn5EZo5l3Ukfoh6qidNO45hA

te4yzHbqe+TkQ6q2eb6fom72Gv0zGowa5bzJErwRk7
UhjEkg2VHjeFj4UHIHUpY1JXTSP22iDm0hZjGdan9i2WZiJtGf/sfTwxZQSRDtFRc0JtDC5rzUAAkD2K

KRkMjEv5XDeLKxPTeyQKTbDUtsVUl2DqpSu1aNAT3f1FVVSFQEk3GHP0eFstM4HjRnWPQFkX/5k5ZEhm

qTWktaYbaAv74JWkwMflUhXeZAGBZo0bKy2gmGv997
TBuYhrUIqM42lK+JUbEeGZxT3t7BIHu4kiijdfGfi7mkV+Eha4higQRterW3dp3pzeYwNsAu0pOqkIxi

HOQy9uy93CWmjygGvbTBuxEVdIRcJv04szjm8qSzu9tsrEmmyYpKMbu9UNKa0kp51EWwvlpfow0h/J55

H23fWQJYdQLHnBflNp3wJdEZjdt+svYb7v5iLjCapD
OUxM4EVwdE3otDPyrMGkS/Oe7qI8aNFqhuNF859gSQlCWHG4aHtM5LhR1qc4OKcZJU4vG7FInimXI8Zb

bCECMkX6EHINU+vna/nXqx565888N2HHlE3k/L7fAVfoqQix0m18m2w4EUkx039pJxfzwsZ6T3+Hc0n6

2a2dZkFyT22SF4Xe7ebedn3On9Q5hiMAATr76wJoU2
y2RNy4ob94nWq+RE4Pjye9tTfF8ehtvI51zuQxn+LX5BJMk8McbCniinucGQyGYgCMR+OYkSTI0RZukr

bfgARGDLegli4eVIgyeJG7J6HQcVh1LeSf1/rHBBYFx0cV/3mEeaNnWCIalwean8DgjbfD372p67yX/m

IPDoHUMoLbrnuqioiktbSUAlX/Oj/eJ0z3l8pGOGzi
jTH1ib+yXJ3NlwM+5XL68gd0orictgaZZ1QUkm1ohYVnGHHOCI4xZs9R9WpmOaZ5Cro8GQmB2gyKwi5w

SrsXwwZBMkyqM0PUen0m4aULG/Ch4/J36YyTH8Uv9/et0O4atkkVveJ94TV+uw2fooc6pvTDcHH/tdmx

VFxF94p9mgEERqXSQfxkrG92xHow+h92hLGsF9S4cl
OdTGIdnaJ7m8mB5oFXCpExGcApw09a4m9dzoyoYdYg6OMjA6Y6J+SKN4qa/Y/B6+VCHokSn9tqjfOAHA

9Kdq45+zHd6wWMuTGCSzCdr6ecU3Jgg32/O3EMLLKnpuqxnPmtsQ6rYnHH6UGupyDw5+pqd6qcuyDBc2

pbUvqkxprSq/vlXnTk4mGTcCZ5KO/qVEe88kB4/tlV
yo72H1cLIrvH9u4hBnPiXyxZbRa8UYhLQ/2cis7YJwrP0RRqp2D6WnfdM/veMyETLx6u2PIC+2RffQNf

VMvjsKIvY2+5Zy9K1bmAXI6u15gX+DYXXalk9c8qb1MsUnHY48blpggF/1zoINVz92fsrAq8une2q8XP

qY6UyEnrtkbZtHkl6wFJCjWFk/9mYKNNemZ46z/8f/
u/DrlJRvdixS84g9bCH5VgNSOl9n3nQZU1dP+ONU6JhrmWJz/L7eOQwoYydwQAx2zSWaxbpEihlAuMeu

ZKSYZG7gZi8pc9zq56KPmNzH3Te2zp6/4+EKHwitl+fFf1KtgcxuETWC6/JDPq3rUHwq5T+igjHinU0u

COjc8FVRbtlgU7Va+XHZVbmDBDuRZy6e3WQm/fX9dT
JV0tybH2PltBwgQCN/COoInreU/mm/9SyvuhlUcQv2xkK+3jCf6ZIgwz2L977itTnkyW12isZ828t+qv

v+v1Y2inRs4MYioToVSBN602283v+113uElqdgFLwVwyeyDvR74wZykFu8aZ0MEtUrYELNiA3fwquTgr

uifMAFn7HMijDQ6/9pmJSOMRvlngKV6hRiksGmep6Q
xhHDMGSimONIYBmfYUC5nyyhigc3dVMcH/81RfJoNNpnxuufgqdwsF5oq2avhceqA+l7lb/5BD5ajSYU

Uiaj1NdXa2Wo+OcCTI+PV7U7Lfqn7gJl6YtXY7qBVTpb9UBpLcaeVk3wy/yw5L1fuuIZAUyAWn6OqfMX

Ax+9HECreS8Wvk+PeJz3AytSBplR0y2e9SEaqQYQ71
m/QjzMKr3VlGORvZVJlaJvJbgs/3wsZXZdbJstVEKWg1bZ3nSgno6SHqqggXP19OHT0jpCB4vxCMNDFz

In98uaOmbDptRvTam3BJiIGn6sHFkfNy2Dt/116Vt3kL1HacJLgN9ByGiS6OtLW/SXDgnxUisxvkWsIY

oShrnWSUOKbM8KBmLU4Sp/yr7L2N/8qtSkeG4XUOAl
MZd26kz6um82pfKjz84SEkd7QWlynsgsG1I5zBvgaDzZd55WLEZ1BAfCqIm1ojSPfdb90iE65gwt/9Pl

O7wgiw0GeRkIdjfw36r0+Z7x2zxBt2Q/06HLYPAx0SpM5b+3zfvLwijdgZm23hriXrei9s++5PStgXNW

WcjN4MzmpMeUkjhcuIhsfKLwmI3gh3+0elB+W07Ji9
6pGuDqAw4cEvPfrlJjYX0uDz9vgWIMbUf8qd0n56EmVxwwP9HJucXZS5qYwodNMWCrXY0uP3PT2TZzJz

PXqJ6Hy3jsl+GA6Gqbh+tWkWtS709L2/i0mnbrKiTmAIpBxdGYppW75FKRRYuyiP69A+qWScmylQhTHe

GOYrufOEz2BJGuttfgB0EzlM71x9kQ5PqnxeY3Flst
rStoani5tUkJ2L9m5qtJBFdySGldf1uA7jeUw75Lsos/FF7t2JMg3ITB/dWLqHKk+LQgC/RHF4BvUORk

e8OHlTplDBQJNMH0eQG6VumdaPfxQA3POt9PKLekZcohZzNtyFJ+eeMSYj24MUC94kJl3A5MDGuL2BRt

X998uU1UkwdU8nTpWdLsxJpG1bGOjPggqX0mWRNSD6
fqguZs/lzYoI0Ulqdcr0XRsFSHMVe4tgggPdFqu4gqIavRpXHV1iDTlrcYuRW3kUZo+yG4XiI6/5hWci

egMkSvoRroEOAs+wx0ro/bORv1xrilPsGb8zW9HA6p9jDdPRvqDiB9ZbnGWAMFftQTLNQjV2VFXrv4Es

66rUCRb6MAlvW8XqwQUT1tFtfm+04HaoE2zSztuMiM
GpDt1SOtBQ60e/t0M4G8lhWTxf6cFR44xNnRhka1gngfzoIZBfJ3mrzlzPZ0y4s2lEinUfma5Mp2Yma1

mo1/SwfE7eeejOlWAFr6Kr1Q1ed38J/cU5fFhpBVr9ZuIvP+Q5WTMDQA3QxTLP8XQ8+I/Go+Z9Swnhmy

w9o0J6wjM2LQ3+Pyu8TlX+ho+HS/eXMKCGY9ShTcjB
AFinX06uhB6EdpYkgfO5FpVwlBCP4MkkvEnWBLRACfTriZSVELLDSm+IYSDHaMpjKmsp2wBXhynQbvzf

fDIDziJV5nYJv4jVVLs/BYhdwJ08jxu/T8ZHQfTPl4X0KSolFGXozyH9TwdY50hb6itY78tyxsYyGiSq

RoBQGYKhbNNqktq9Ahlq6aUYkgAq993Dm1pXOCccCJ
qrVc5SMBsYdky7vlYzamvFkcFrDtvWnkL4n6InfvN1kEzqHs0l2tSnc8fV/F2H3sR2SQXMywBbMrqZiy

Ok/HiSp+8OnU7nsfcK+F5poqw6NemHWxK5kkjFB63x6xD7bQxS8BPKVxvwMbVklKWSHmmWlG4DcMrBCs

dnl38ANzfZhx+NBE7GkOaelevSCscZdnvHxC8XQNeM
J6Jo56crZmFvTGWQMe7ZnOYqwOo2gNQrG7cRMvQywuUfkXCbO1B3enENd7e1p/dREMoL+b0ICXhktRIe

hIyNoWdC/xS7Ya7RFlE+uSBXQFPtOxTbiPOir9CWj+X7QDvdBoRMSrz8oE8XCLuuOTui6n0Mmo+35D29

sryeyp+0cQOI2DNC4fMyz646o53f/Qc1nhryxnxCDx
zgDdsx9h/a9yx9XzOyulwAmY+fK/ioBi4Ks0WppXLUqGWhE7fJggp3rtZ6Jo/+IZSkwJmdMmJixyTEIl

MBONy1pGdjrwLiFvZNFtKJ1f8CyPLFWzlaPaiCl9V6bQyXlM6Ww6kIK4te8e3wLNmCWecJbBnfF7PYBY

IKAUklrGiwe6RuRI3Wn88wWQ9zlmNigRFucY0vAufP
+tUhW41nx+Kbm323yoYavyw9Trv2z7ji58N9/MQ4o4nIMiupdBlfPhA64KTxISe9UnpmWqTI9UBSMjDh

wpL1VhY+COR8yB+qIB9igpSJUHuRYQuSRQuUkwqax9t6BmfZl2kRp3lWZJ7REY7j0g+Q22QzX0jh4z9E

+hg2ElidPLcJbdNp5lqSfyIxzZ+zxTNTfDLVpppKIy
oLxpNeyijMhKibuzqd4I3LZMM42yuXZfPtVL/quVpLqhWOH/h3AH0sozlAgNTrVngUHzyh9SKXaHjDHp

8vxt/sPHrzc7+llNKi63iCG7HlG2kX2l9wgnCmC9Aboq2TuUcR7G/H0po0lOeEk51ijzsF2ay4HkJbCD

6ywlPsL5OLzexwL9CYX+T71/FXVRbHaxMRSOpJ29gi
3sCL09GU6GnBbsOj4rQ8OkRjWPQYnJEyzuAVhx4xtO/4mRKKtI+WVPEZ2+fKWnLn1MIkT92dHgC2q6mG

fM6iPyU5eDErjjOf0RHBjkepxFiV6MyrRHy0crvJXWI1ka8z0qHL24ujBpfZzfr3HP4JciX4/jdju9zX

9p2Ij6Q9WZdnTfxxD2aPq2knp6DkXJ9wQEI4ymVvi5
QN+M+UcrkPx4RvbEPIcjbLzzHgRNAvgPOwfWYxQXgJLo668ITrtmv3iSffARszwUeSiK4XJzdB8lEUo/

svJHQxPuQCmOKLCGs89nc8xpol33yzaMTSiez+cCIeWtYJCd5k81U1g9T+q4X8b7ahG4r62n0IUTwqb1

czoYzi1uCpuG9SyoRvxrY+UpCghYdlezJ9/3W+zyOs
tEW6KX3k2k/QEbqRpvY0phqucbQ31CqV7AhL488WnxowT3VlcnA/ebkQX8J8nrZRi/W8Y5Q44pwc+RiI

ZTDwfz+SVdRdvusB4q2vhQsiFboMXTmlZCws0I5NTkMzElYqRsK/Uovj5x1Iq+9sN4qYhqkk1OIv3fO3

gzA5nJbd+4+5jDrzkKuuFLQnDjg4ER16tlNZIY6gbF
ElP7kj5bOle82+OSNHT02gRj8gvQNRPtfyE/RAJujcvwCEtxHb/ivVzuILa7PGPC0PnfkiLH6vwi6q5I

HJp5l5pR1RFuM4vDfkRdbwrJQtbVe06Eoqjd4uQe6MHCR+WeLDx/ZnEhLfbD+z2WTa610kPtXJ/IO99t

1QbSg0rMj2gwOBwAX2f5kcCkO8L2gQHh+gwMbD2tv4
2qrauUkBL4jVkcrwqi80sUq6QOHPOwVbaqJeQssl05N3Q8ox0maf5KFVnAfmaoC7Y0Hgys/jykN3OS7g

lPCxW9tLubBGlUlCLwW7jZQQN6uB/9O6HoVJx6Q4wOeQ8OjkZ62RKB1TLIDeJjHdlblLzzF4w2tLPwmc

GxHUNk4F0pgQFEeDpGGpyOllEonz4Wc9YYLHW5sQZv
tv282XBfhhlKLNLBm4PVQfJe1y5MS6HOSq6QhCPBNAJ2xp9u/MDdDdfmKHb4H6rZc6T3zND+7BDWf5kM

gnanWmOeBQ8gUDPDZXUIaMRizMlb0Gxsd+PXE+poXDLChut4h2OL48lJKfg6lwlN6QcatES4gtm6OqXf

18ztO8lk+XRi0HdH7m+Kok5svZsUGoIjKCuJLmhWpb
oJ5GFup05AoEtX2ZDqBQxo89Q4IZNrijPKUZBofHan8gt56HCfVfMQT6CaK7qXMI5CyPlZEuudUH+4UF

lQyfj6+q19j09p0VE0G+N5r3WTV+zQ3mFG4idA5qNe3hYr92jBA/x+kRS8HhJpLsJyVhslgusFIk2eag

QbxjMqwziui1yROAScoP4x626wETv11bP1yPeXUHLg
6YaoQ1XPkxkpl8/VYMkajOs5oZhH+Y+XSm6VeOnzZj/qakIOZkCSGMs/AZNseE0VkCcyWB1ZUQCuMsCB

bxLVk+KGSB4WDvjZzNCMeNiPHaFZ0Nwqgmg4x9oo4sdLODDqsVA8xZ1C8KA9d12IuXsY8k0p2/mLHDmA

f/hY4viCWuzIg1htrv7uvQ1FM8pPRJC5cCWHvzlQND
EA7B0+DXE27nQ1fxtBfgKDjLP3zFEzqlmEKhm+k5Yj/IiAkaqT69MqBucFCWu7v88P1wMVp7L16Q11np

qvFPtwK+yOuA3detlLUWDzWdavBETvmYmW6rkMq6Mw/S5tDRt3XLSu0T5kPgN9H5baLtLPX2ZTGGwncP

y+rfDwE2rSExFob/v6rrOM6OkHJGRjKEELl1CFEMsM
fQCoSget287njtrCO3pOOppUpkMlxLZq9xOv2OwUWyNbTipQgT7xkh+SMBOsNIuZ1qFqbV59boy5ZRx8

9Ff8cBwuYPC9d5CDmUQmMIyCHnc1q5n933a4I+lfA3efcsxAO+v230AlZAFQ/5Ja39cmxsA+sn7SUht4

hQ3c87ex7r7TuT59QpRPH2QcdzAWfPvFUgFWe+DM6d
8L69a8ElJkv9LbcYzkD6GHdhp0le8SiaUaxl1XYhVDo+C7Zl6/C9AzTZkvFI+BjlEqHDNAQM9M/8LPN/

HtFek3wlJWEiIvuGioED4UZuas2wQorvoKtfpQVQMfTzbwcUOMkU8d8LTcgA3MYAGCuxxGRV2ptkedvM

iOtf3iMkr4m7hIq+9Pe06XMHAaECgDaXT/isVrgTvX
p7V+RYXqGgMSVRbH1zRYLtnxYsGrpTQHZCrqzjJowRdPE9zJjRZQzxX3DYHMCngqL2qHkUMULcnpsUGN

UmbCK2/MjMJu3t844hF/0DWAt/Utegrh+iqtbIPMJ/kAxR5gKpVXeqJosOfcgT+2lw2Ib+qPrlTdefrw

oYLXgJVzJH+MJyWYGVyp9E8uxjxwge+MCWpoFSX45W
U17q5+/Aw2lzlrd1yB6xIBbBC8kySyY4PYWiM5yIuL6OOww4/LCh/7OTMa5+LXbjkr3LPE0961uwEVwP

lbF6ATJK4KgDitarJKVjGzMe44Ury+6Q0L8CBskpTjvPMsnMCT1GKnPzr2H4MwuMhMAMmrSOcObo3vjg

cP53ev8P3Rd7cWgl3flaQcXzrAS1LgZhfdAx346jjm
C6PxFXQqo+8wi7Un8tw/ynICWOsJq16ba0a+n9kUUXirvlrsL0Hu8erYhaFVyo0k0yg62jSbUP4wcTUi

9nVq/k02ZdU2QsfxlPa9IEv2ZR0FDNIMGU+zYOpWer9ysezoS3ZTelCp4jWK8VSFfiEf30pJGS68+IQ7

jflsox4OU5AM65b4wRWFrIjge/AdWujTZu/XV5jSpw
mWZmmT5hg4f5ykLdO2OZyYbA9l0v16tYxnjekAU+n8Ts8kKR6FPXNbhrZ+OX7QPkYbGHoO8oVpcyk3nD

R/aqhfAyzrIVkd5mkuv7x7qRVvt5APz8uoU/hg3HKNPNjTHj4qPA4zFgLKG3WCC4mLGbNBIuGLGTQ3gN

FSCAnm+nJWFs3nGqX44pbiWof0MRRfGELhnFGab+fF
ciApudL3VaBjZyZke3hwl3CZIJhWi+EqiUnQ3F7qf0hjD8MS7QlU18IXVdD8Vwm6+VJxx9l8TWSqvtXP

AsozwX0IzZ8GbUM30dTXmCK4v6tuQNLbDgW0zOdcAjhcnpIcvRkAq4+8PnziQrRfA2vxEJhinKLPxzHW

PysSV6XDiCvdKdtUndJuzrVGNOycko68MnmjWvFWiP
I75hDlT5fq7eRuSQb8jjaGadEQ1IMGPdbQ66qwKR9kwW3LUp5I0WeghPaRhO8sS/pUi+v3gWGxLA5/d/

6oxe4Qi/ecbqIdk4ezQGTq2LFGHuDT9R99vo2Gw6mkD0jxe4b+WfrguJaRmA4im8GoLoawGchD6c6yS+

b+8UV/x4bkhk2m4PbJvQQeOm+BMQI9aOTpjH6yqp5u
t8zkoC1CWWMcsN6+0ySNgO1lf+7lNNy7snQR3Vq5YRzdUkaLh23of1HB2Oqz6CAt3hqZ4nFN9DNMp+jv

PlSrIfYvDNJeiD5r07/VcV/wgrbYNbQRTkChx83VSYEBNk+/H9gM96G40YAm93OomhWa1BQUntEDplZm

DLhkbkKgOW/5KeEmXm/bimYrO/WtM8atkipJ+ImIaL
VvyDhA1oEsbtGPRV8vvvZrCY+6ivtnSlHah/M9rDi7l08sMWCOf5IYj6oRIdQn0kjbmsXfL7Oy56bTnH

MMGQ+0Andu3KitZ6VSv+lCbv3E0//FcF9oaypQzd3z9qaGuOZwYguoXw/IMORVDL0EVX8y4e+yqB1htf

TsjultmvPl70q1vIBzvXcfrghyA5VfLBJtI8smKwKA
NIvFEZav8Iw1V0v97tmn25BkBUo3yPUZGi4+0fI8/QwYzNYvBIuoVBs2E5qSBfsz52x4ueOv1GRsThjC

3p4Ss6KrsXV9Qg+PMg1rOUUHvH5RlOFVtLtp/S+yaK4vggeFmanTXvPXST1NYHIhTqPX+h/r21iMohq1

6d4XjCTO67MA9WaPQ9UNZ39vgAOecJ2QT0QEL7ryw3
euKp79ZcZl1JaOJB1MKZawk1LdvRe85RcAQ8HWpQT2WcYVgKH0OkR6O5Ds1yNu6kaBTm303Dc6xDQunB

b+Ss1FPeIb+LzWuLZwfwtITDtGTj7/APz8AXHx2NmWkhxbBJ91YRvuG+cZJ2nnvNQ0c389wAtcW9PocJ

LhNgOkC+EqfNb89azHkfiCDzvsbWnGUtN+VQ5PjpEX
DT58wlh8B/+hH3nL96Sc3c4lI1f1vHbCP4IVgllZdNgXCQ79YEBjWbQI+klnraGcfIjtWUQVu2j8dXFy

n6ymXVmJXAii+6iGU4+GMhjvAG+6pC+OqPTw0/SdaO9PIR+ri9tJ87NcEiEWT0JqGnfv5GKtt34Dw+oQ

a5UojryamkAWoylW1oHNfhmqe3VfEGjxMmg/TqbkEm
LG+ByxwP9piNgXnC268Gvl81NHqjL8A6WTlWu44x+uMsoGvfoo+/Rur73MtwIe2hKik4vcAry1kYA2Gt

Pl314IG/UV2hg4K2lc//JYUbSggUuVyxJiUIQaDwNiXqPGyFMuXkAWEMwZxeR1LilP5qKqbjXZfq5hVy

67fW90/8Euc76F/OOevsva/rvd+sjz3cK7bQUFjOHD
8h1uTDwr+J6gaeHqBcT9lc9tVeMrvhL8cAX45egX5uM/sVLV8MlzHQYniSM/HYHG53lrDszOl3Z+Rndy

+81nsOdwOL/psvCHoaDE2fpCiJn46tEMskxdoBcrsPsznWK2X+6vKNmSQrZXo/h6QRrxB1rvL5oex6v1

QKadw0lFnIY5hIlqhhpS3bnlrqno32EoKenLb77l9Y
rmlEX+A/4pJ4wW0w1QR1w2Wblc9G941Ny+Dt5ta7Aeeg+98MoqfzY5s+rLamxX7pgZy3/XhY2HchVRM/

vviMS+NMJSgDyQbgRSNz0PIwtpzI+gwJ4EFqSp2q+oEoMTZo+kaaIwxKVx/6JZIG+2dqjuFi5LqGo1m/

DoyM/fXScJDsS2MbBNZNUCYpyBKswcx48wkU6vmBqg
aYCGUJP6Icuz06MK+I6f8g524r9gcb93wwd+7SBa0SAz4STLKNsjC0syAexbYLjWcKJahXlAXD766Dx1

n0s0vk1E1U2nEGsEo0kvnFZjl9GHFtX31/jB86cUQfZD2Dlu68d6zMrymSobUfv/uPC4TEsHxO+IS9kS

q4fC6uke/wrYkm/UqiPUR+IOEZSBSuq/kk2/Gjl84b
63jPrOlDi+GNQQClc7GBQSl3Yie796t/ib8xMj7jE/PLfrpcS0VikltSNAH5JSwS99eZ1/bu0AvpZxjK

zzWYLyEq8m+e5GDkRv1GkTziEXbQx1YN4V5S3iCbG0J7MijKve3DYqsKnH5V+ju1bkY38B3vV4/+Jg1z

Dzgwk7+Xd3QJNslG46nauMlH1rdODRMhwxNz5syRK/
BoXqDUHSqcIR5pm0tX2t930bOTjydG1DCrnkjPQQ/1QNq5l8pzA57hJqlYDFITe38jf6qmrSPMdEsOYx

3+fojN7i+8mzuEio7hS6ssdFp+1TJy2/aBaOuiy6LM8EzkLxn+wxD1k2aR/FQko58mpuDp3K9M+3SqWK

PSFsHHOPxmRmSn/O+XV1Pp1KCAnIlrPqLMNYnyh1os
hAsTyNgzNGwku6xhv5phtcotZiRTE4ocQvvOnb60C0rUaY4uqFiZB0Kz0qCyDsI56mkTsxQDYA0qhinL

ZcQ5N/1ynwsp2SeqnLSNXZ5DUaT0LG3xO+PLvCrUt45KJ9uJ4TAiSFiEY/XXnZiX7euVfbMDqPeWfaqg

YgzjUUcz9oOGGXB8O9+2G3nB5kBblVBUF1iWyQAg0G
D74bIT9hcyYPAC+rkC7+hqUQqFJG8QKbyEn9FF1nXQ1RgKmOr2TlZ3sBUfA/uF5JAVipf91jw55F/Rnr

co3PcgD+Yze5q1JYqdynE2ImdLC88HyVjSlyPG0DFRNsnx4wIkSK5X5FnGcnPwkMqCLyjyrvNRIACk3O

hg45SdHWIPGD8NvyAhibT835p+Ydug1ntXG+DuVXx9
goAqRNr2sogr/DdJLK+K2cEd7/KtXbPlnw5CDOeq9NBeoXyx9vMy088gtb7wd9Zv+HReZ5NEISaHz7Ga

3HCdV+zVmx7F1wAtmG0wsG5Qcfb06QGegSz3GkZ9Z4CIyOu6IZnRSlKY2YB/WiirpHUgxEny4s5HLT/j

V98f7sPGKryzf7u0Ar/e4+yD0BRkzeNx6+ITlWcmfr
hrHkUQMZkWxaVwNeNs8qecqkY05+OVPPp7rwof/pIxv+I9RU/OkAq0uqC8jK4CVIK6fXhLBSDdqCx8D1

49OKbRoDU3SFeFmDWRTCoKdr7NBwv98WvWn/cwBFODMunlzzB/WPX+apg4nhd5/1JViXIpGnQnHOFs9x

SRY37+ioZ/lwZpxCJJkFEhUBFIcaVip/8Y3ABZigi5
mrH5g35veyZDgfIkCLVkT1vs0nxqUzm5zd1T72n1MSvEIy3th5pK7IUWqUZqM733HNz0fV6SAJMxSHoN

r2CGuWqrdWupmw4sq3Myj/IwLM/sRJ0s9ZKfpjRo4Yfh7+PuzTv2fyL50OMEIpbTwDPwXWGpsV6PF+iz

6UR/xiYQP0frr/YQ7cgC2eR6Z5LMZdq6bWYhH5EYmV
IdVsS/Su4NIBNxsM1FKQI7uqqImp31c7DoFIxoXh+E/aU6cjLH+QhkLOM2mwySRqm5o4OBQ87F2eGeEb

mwrhcdvnCogtzmoO80Wf6PcO1647SwWLdIA7N0UJNO8MaKLEV8nW+oWdfEkBnFUzPxd5vhOAs83+qhDj

hdBEqVOHfmT95C6H8sL/YIaB2knpXuDwVu0QOnJuYB
G5iU0BLKl5GIgXhFIRMBZGSde1reDaIoX+sV2simAn7wo1CD8ILQlferz6QNxXUdTM8ynIi/CkOLZWMQ

T/41fzC66FqG19+e0IIe9T4aJ2OSH5H201YRhfe+m+WTPNrfQJTGF7OfzOvvLjiAYxghPNBWLnjT0PY/

yFftzLmF1qiKwIZX7L7+dctfVfwwnWZO+0OB05mX3K
rgLvigqCDVXWefi0Vj1v/Ox13xl989oBMYWPGYwDCTCLXbJ9yuWQhOu0Qqk0vAq1hC7UsUB7mIzesoya

HKFINwnEQsXtQEF6P8H5lOVwjhDVHuN8hvYimXmQdn+1bFm1jK4ZzfCQY8NyMKLDBeOb2x4HME5GKEIT

M+bC8LEzHhZy4RA3s//hx9TEnI9LopNtBV0e4HIm/8
BMNjoVYkZpyCkiiQOTwawZx3PlqvRbfN3W3qyvTkMZh4O5TiFzAMYpS7SZYC2J4mabfcPRP5SxtBobOu

5yGoHs8l3S5m6GKaHwl1ytS31izbLC+hP53bG23+tOrgUB7tppS2yv8GKMGF8nz43WtX2IgBT1Hb5/J9

Wdkt0NKDsbB3aWRgz830uj4SvXex8AAYRKOGrV/m58
VKEgtuq2UrMd8wBZ+or86QXrO1eLQn7HyKXgrMiEewTyDLwb3bmUCFmTU1QUieSVWkIEatRzupQGNpb7

6Sxk4uXY2MdOpJX3X6uOwE1fZgLJpL1rwj3o9crbK4ainvIBvRAbI1R/ZovYd8+UL0hJoB+5TTuSEiLn

W7YudKcxX4C1C5lk6SaBBZc04q8nAzKAvQIyIwX/8k
zYQe2vqKglKOjiQbYwLiIsJIbLdIFaIi/bkm2odTXiJ+9DVA7Kuz1ox5tHVrWWMVxyNt2klV5ikF2R8L

8Xdg5Idp+hAm+XYZ5xYAzN3F3ar/AeiE2WI6EPdlBQ3EsHfT5x6HZZt38lbIzZk7/3aImp4NTSW/TBa1

uR0GE/TpBJiEN1GdZiYDH+EFxR/xCP7jVv+/uxOon4
NzqOxEQT9rHjrjpseF4bJRBikzdvPYoIBSzZ03haQUrzCXpoIKNdjslM4KcA0uNlZxVjNVAgVHdhxRjP

ULkjustCqMpvHh9AL4WV1Y6cKh/6gNlcxjH9xRgoHG39YO5DcTY8hQwT8NrDl8S3CBIcKjqyYtqYRsS7

gr4RV0qByRtbBzM+/2+EWuaTWCKCOmMCd4joX/Z2Hl
TwfOtE/59DOdRsyDBgsN4G0bDU7kbzFza473Vqhslw1OIdR3w0ig+LT+20SsCT1vBJkqoBg2TmepbMF/

t0qp7+mn4L+WY67KLcwhk2IB+MQ8uLxSH+3te2s5Ftd5opvllUUAuPE+2epgEr0rtyxP3BlofVLYdZu/

etzSvsf9tooaD+OqpjQXRIghIeWStkU60a++WVYWLa
TrwZsWiSa0juE/2B/AAxWPdw65P46hPr4fnVpacomoP2pB6if8DDWaafsjtmL/A/vq4ybKTbWMNyQGy1

YMY94WvI4xWTE22RAd+WKGf8dsiTSeyUqcrrzwHuTPgSmsBnsZs/ii7t3Yaa1VGIgA2BE+ttsHrwTJhV

6pASXkWymdyA3iI7hy5FODbilequv2Y9LHcdmzE1nj
P2d4NpKyJjJtoQ59bLTqHTtFR6pX2q+q12ygMTmey/IyvjsIjb1PGXSNb/8tBsWb5Em9/iKVXl++4+sV

Hlh1rxM/fbgCg0shJLY7gZljBEhD294f1fRH0AXmn2MkMp56zaUYS0l1dCjC2+YG55HLfDrR4k/9uNzm

reZ3dNGSJgPzmAZa5T2iIyQFIvs3d6Az9KYhY9k0is
Y3OFGIvdxUpLDw1XOSKo3/furtroFW+nNshw9takwgxgO+JDDQcs5imPgNxJgUmH3jeF/Zy03oicQUm4

y2YezvZ9CKjRsUN8gSy7OWqOjHVMVJhGgGjZJNo38ZMwljpZr8MQUnq5iYnw/loywI34cZhfLrrDLigQ

K9bB6fqLWHhPe1wDMk3VLqqxve9GFoOLiXmiBeq5cg
Ls17Ez7jFnWv3hRPDDTP9LN90lY2wJNkv5tg2NfDumj/OGwe7egV9ajUYpdvp4yvgWbkZtflb43zPh0W

JeT36XqUqfdjMxBVbGRTJmjL6kFAzUu97kYy2gjSWjdKy6a7del6fXqvlhZMvHES/FVugUgN+7mXRhvr

mjetRYd6NKa5cEfExLejFgusT9BEgEa6uM/nxps0hp
uv1blxRZDSYTSz9KIJdH/AaFeLakJE85J1aADRTRgph3RPdKfASEW7gKqvPetCyDHf3Hf+ss9T6jM70X

eS/WNThMPjdYjqReJf0o5X/4Brc7Phs9GJ95s6yd0HFOVmCf89Ypl6j1WEOMkgrN+HSRmUWXGQ/ziwfN

UOthlPKL0iRed1BhR9FbRyd+sw97mr7Kyki0Jzp8J/
lXN2G+6YpCjafvYU+bL6bmGqjaljeek6pPjpvqom8MNeR7vU5W67ZS+LZpXT6vVrHmCI4MFnJTf7h45Z

jO63rCxHelFAD8GhiNTRH2Qh7teZt2N5X1IHbBvi1n4AktJH6xnEKwsX5ME2qJIi2P/4qnx95raN/uFf

n7fy6tHvKaU1wpkWeU+T+ioUl6sZOAOD8aUfj+BMed
lfUhnQmqfeTybM5KCwKxB+Y4rkWDZIIGmzVVgeYrAHgi8W75lCKrbsVw5JxZsjINLsnXuXmvhCwT76xc

TfxN5vxeeIEMfnBQgklNDChoi8P1KVYAor9pE3ymMt5lGlWhVTQBzFbFrctENjcUPwoS4iZVhXKBvQTq

lObR5aM86ruNUCLOjtbLp/w+vgh1zBA6OUZyPAfRJo
NulN1Uq6h4o54R1i/zj8YgBw2om0Ui8wZ+tB+Cv6i1pWS6QsheHCrnlBkgRkYjNkRkmHkMvdl/yNgVel

k48eYng0lgcLfEWduoHMTprRq4w2v8hMljDe4xOY9Q3UtQ3B916RtzwJYOxwOjTKpVH0idP/MfhzGRPH

bVAazNILZ8olEhB3Gdf5zDDF1Q9gDDcpLMCvcsbfiM
8K3IuT6ly/hKgs587BQ0hgTfftZmn0VG5lri0+fESzo+UVwIFSSUZ+H4RxxWaqxQt1RteI12m1oYLYh8

nXaRJWWLG6igwiz53PcQBqnyqnN7SJmAzX4wBX3JHu1FjmAZQ9YhKwTqIYKPobWh0iXiiqrSmaOAXOLM

fzsZ10YiM1SKs5xUnoKli2XsXp9+znVKjnVE9nQDj8
sfnz+b0dkr4YEmcd0yBZmHOoFxmErDIbh2s+D+hBtOJeMNU7vAIhCQ95ud7c4wS2XkUDluX6JZgyb2WA

NQxxfFKjG64aFyNUFzuPf/ohR+Jo17jktmM41GcMhyrrH98bRUeKI0jYKoxV1ssGtH9lC3Kuf/QDFXGT

OSd5AjaNSOXBN7MyDIB22r/QHqo7EsC2QQ/eSHLuZw
MRYvtpTBMh6NgxTL8dnvE40ArB3/IgpVz0MaMYVKzObw9N7zbnKxaxR1R3qFH+ebgYt+wry2MtuoqTt+

ghC7ublesHI5/V+rlf0mzacg8nU5ccdA8oUYOORUa7QyALwPgb8/wL6F/rKHtnPc8lN1zHhFT8tkiq0L

9u7o5fwKSKpoaxr633Rv13LjtkrEZ/8rp3arZ7yDIj
oywmaZUfSqQKLSJ3HX0MCjbMEv+SrC8IYCntaZmnKSfh/Pn/MY2SInQL21OxbKxkJwbPNI5FMauoqZ0V

a+N3pkLRd28mFy4bLgK2zcFSHhMP+Iy/T3XfLbkGF1dqGERk/01BfwAIOS2YWNrQRfLmzw6si88qECXf

gyFEygPLhs5fUgR80/CQ9RicSztk3WQei6wlWPg3v8
X6aYqxGbAqZOLsNfM4zK3H1SigvnOY1A2aLrPUFp/iLwr2DxnGnKIPQXnmRWl/CdYm66hPBdtV0rtRsr

F6HhZmnblcXsBKMjKjtCYdAsAtqf06WL2mHLi467dcFJ0v+UGgKjVoJPR/TV7or1SzKWZ+IJafETNpxS

SSSljcukEMRVdRxCxC9IGLUNTQBWg90cBw54XPK0ju
qmzxPggWR+wYU5TMzsdso/+PixBFjBWY6GRzwaBbsCgubXGpIztGrsxqwOMqHyw+FuqXRJW4S16ujK3U

BuWETtnWqHV4aZKO9tE1MyHvynHgGtOs5mKHSQosYbZ/NKDeoKVNTZD3mgowVDpXLK0rNBa7qUripLo1

X7zyt+9Xxv8QZ/i427xcdVmjjG0c4yNinO+eYNdiQX
f4wBBh2dwo8xg/RJ1cFDJy3JjypL3x6jwaG1X7wQr4o13yaeMpMYYh72ASw3NS8g/J2WYWnM7vKH3JtZ

Cd3Z3H0ziymFm0iCHC4Y0z5SzbmLc+C+oHTmlpEIZHjtCG6/+I8s36KUFFHG+vkVuD1vrIXhpB7sigRK

YMsc1iGBgnRVwXj9Y/qJLQ/llWPDG26kIUkGlBNoDX
m9sHL2ZdutCByvaRFi8TFgkfWSpuSaAM/Z/98ou7LvfjDftD6juWLHJEHBKvp7zkZc0352Fg9+6aE5bM

Y15AGE+Ci5mH5Oab560Quwc3Vr//wuo5+oGRlZ1c/zzn+prAPK7izAKodC+7iy2caZts3WGCO1AU6WRW

ZRCmd1vdWdbgJCNfBHotbCSV3n3GPDM910iWPQ9w1T
ngTub9VJO9PvqLppQx3voeN4KkortDyD0lbBvNVBiz8RIW08dw5PQQY4wvIpDV0gMLXXa3dg0EUhAXst

iA6PCt2dJ/Kh97WPfbaPy+uGnLkXzMudE2Fcm543c585lth/ypwVvQ5min0cO2F3UHWDYtGKYMDKVdNa

7jQjJtR6CAR4XfeD6YZQh60TYTAprfwKp5juwkwE3c
l/7lopLODBtu+a5E+EcZTk8CRRHG96y0GImufbtk8KXXNleV0qapce/jl/5uwcnRvEcK9RCHR8Yej8DD

sN0fFPLV8XbU/e3TTi6cFCjiCkEFRidtMTJq1NcQZikOT5gu8pV8L6LYrOD9lNmo65nDevixTE5+ya0L

HdNE1wjmHhdp5p/kYwbrnTCFdM1jAO/YpJtkaBZmgE
d7Ax13KjpcQzCzqTre8yUpgkFO7rEE3ibpJuFVKmiVKRXIoCGW2E4GLgXA0D4jgPtK7Hf859YlRwowdf

/Yk2a56MctebAcR2nfapLzKCjwLFPJOmRdlHDb+5oIBsD5Fr9iYrqW+M9rmb3QZ+zHOuy8NOaKcTtYwb

w6CNn7Wdalqrz35e487k+rg3AYWUfUAHwBcLLI1FhT
Br0FT5jGFTMro6VYY1WkfJdEiCBHtZpL4np/sc8OCCD+9CNLbd/K1beWGeUNT2ZH0/arppk+/KsuMsJT

SwTo7KvJeT0A1/JcRHfnJALeBfXkBdDcxIBNKgYxAr2PUkDn8fgV3wxccevuI7TL0XPhiviylWMEq1jM

V/DLchFt3REof6YM8xrYxGRJcD3CljLOg/y5eeHHYW
T5ANkX7DvSu8yadVq3tyPAPYu2ACUDmR/Qr0fWXq9d/8klp8zLwpdsW/TogT0+xR2/RJCi8w59WuuMCM

44VuOR0WBx/tz8OE0Z5J28667Lutc7DMMBElOuvA8DFOPtwNHFcDSlm1Kv7zFvJ+L7h9ohUNz1l89mcz

pg2SEUwf7sBCxKZRrAPGc3pacYKfRwqohDsMS1cTXQ
UPz+2U/+WbBRZvvGFxh+IRl3MlFxLpSClkSqC9mdy7A8FENp5XNwxuoOTwwB2DulN5oOQxeErEd+xllv

TqBtKPWrqCq5VVb4WqQ2sZQbVtNfG+2OHJaJ60XUbda1RtHvy42rrvI6qeU9WOUerx9kKrowhIjXC6a0

P4LEG0Nw/RqRbeviAnic5BkHZidj50Twz/rI7XC2Gw
BihyRwAPXkufoEa+lFrYC6JC9aaz+/SzZIq4BXcrf4G4MbhIdF6rmUGh7uY35OTjxk3JAyoKhfWbmvn4

j/0SpI9OQeUajwKOOt99gwcfn8gq1jO8i/Va/MBqpR1A9661pyWQzIz8OdpRJkt46zKK+mys/XsX3yqz

nmKPb7iaT0yyNHLcteoBaeV5MCIs62a/lesuqfoTTu
yL9R4++yv2bo2ZQKD9veTA0YsgR7TqSVEGKIie/0I4qTYg2seb95D2M4zwccTbQCLXrc3NmOuhcKcP6U

gIxFex1l0Nvao2UgAZEfsdkIgsW4zzu9PGWwT3nofJGwcZSTdTeG6waEilfw1N3a3NWzabpJ0wq9Uj2A

mEzqKV9/Jr2uOo+KPm6TNfuWyOrT+SImtedNgmpT+u
SWuI/eNLNrEkVJB6o5Td+8EYTiT/85A8kWGDIjZF+S327qA18KFHngZ0dUsbdfYfnkf85IK1PFBqZnjr

sVYmMKkLaegjH1saeW/z4ilJ0xl5rgFzox0IWoWRcXjrhiPNzdmpCcN2fNfOA35e2tl/Xjkz+22Ay+TG

YFX5JRRZz10H5GV4L6FCHzaO3G9nSddGY9Dk5+V4ye
iSnjVp+cZSp4lmmB+zdo49Npx4yyqjZ54O9F+j8Wt7xl00foTQb+571yd1p9J3/1GHaj0WX77VMmB2Tf

Gq7q+mAqCweIkKl9F7yQIDf7i78uOVcgdJhiubYeH7R1AG+Tbc/vbq59PmsZDU+d0sdDFPoujCkellVo

tLaaWJxS18+kSoIhqwnNQ72u9hYfw2wdmUxVKBhKV2
Fmw2kTEgkgs6txwKv+XH/A4pIdPYqIe8oAwpmkb7xcbelNi8ZBj3uHD/b6bZ4JNqZ7dDlE4e5zDuMMCR

mnu9A1ikJmrq87DygeKQyuOBYMOggz66Ej5aYhef29/6Dd6M8PdT5asBgVxbGOTfAnjswt3SVbQ3IyHm

+mW7DRbD6+A96/4tynx9tynvKeK32AMMfiLaZH71j+
QiWlmh6lfK/Aa0ayAtQWe6CmPwQjToJ/xjiIkZtREbyOKhpe7KR0KjbmPcmp2QwReZaDWvYMq9/jvQuE

dvViu9rqM67Xh5n0FxcHOLONaLif2WS8iNtx+2lav8Rbs2XgC/aRyaDU2suAKrteh/4fNVM98wVdb6Eu

94Sq9iiyK7ynuKfCn15aYV30wX4BzuSlsPEyZWELZj
mo4bDgvAa7kRO26nH1eL1kQv4Ld2UIqky90bwl4d5H7fqJ+cmZ9K+Ao4290gALAlmkOYat82RkNrlzP2

HNVT7kjMstwuJgM8/iwC5BHrSJ864a7cuZTOmHDPQLDuBiqjF66b1Uj6/lc34WQVk4aR3HyyE3rioXmJ

hBamyxF877d1PFBgXKYjRGzmtRRemF54Bo9wEVwmF/
ecTo0YTIqlRfpcAbOj9nvz4l1PqUayNdA5h3PTLjlObME2/Br52IFv30gdKcayifhQQuzg6Yh33VWd5z

X52VoWFJCUF3ts709kVXtlQzluYjiQ+EZGtQ/aWG6u8SIuoNZb6NzVUlchYSbL9S3EhPfNcks4T3BteU

Vilf6PeCkWn6Hu5U3jCon7x73CdVjSfGREnxYDG/+d
Ok6KxZ9fUM0gxZHetX+Ks56jAcMpQeM3M4TL1jIMXJxlUz5/0gT8K+iok6I7EwyvHB6nFafI9rlqyqhJ

qCVaP1rl4LDGgh8+7PIJ8qtcQPRKGCYohiHO/+FVZlXic4xNAPtQ7TZVezy+WLkLPRsSj6D6htQdqNrn

Jm9vJitsjJtpvXBD5xrqT+t9LTLF00QDuHdf3Wh/Pl
qgctn1c+j2MyYrtcQjITgAKKtyqSAbyyr4YFhMx+Zp9kubxbrNCIiA+KFE3N6lljkuW8knzKCm4twUwc

jcbGOX3J/WUNNdL8yfQuvkMa05oDbSOjBQB0LXL0gZuWlaIxhqyDYV2mY7vsh0LqSE+8MKtPxS+IViT3

9y8iC20KDwnpAgcJ97OP3592NdG66UbNWxkOF81zlf
Bnaa+LvWPiQl8bnH9aZLScH7R8E7f4SiAuyFdXgWvC4+9Azel4TZjaKMtZW32XST0KjqEsUR5aaj4/dv

eE3V0nQpu4x2JLkaVaoBCx5SOOKRQI9aIeJhzBIg2cTlDPDkR2V8a5mcnPRBolBQSCp7CqIAyFs3GAEq

+Wn1/qCl7qL5vIYjLsFrQkC6T+VBaaGD9BrGNPoN2A
N/LBYn2ET/KdLMGZ+owcG/HU2cl/Fso0liJ/zPgD6NzENg0s767vhdM2jV+B3PcxR4iDtLpp6bN8jTfX

t99SdLc/CKhHS2W5mkJNoLEARtsYda0YUpkJUy64R2iO5Okwb5erVumAsuDyQrv7eDJyTxAVxp8iteFO

gXeCqGNr2apEFfUmKdSupczmh82YjwnVuYRcCaxXLU
CtOiCKimMH8sZ4Osy9mp8fLppJgniVktwYARkm2lDPB/hH4nCuPgvBXfv3yKX/suztKFbnT2NiaBj8IX

F0SpEfjudk3r63DDve5BstP7f4Z0HjqLt48aEBz4ucG2d2Pk1RfNRk2JIqT9940L8RmfkN9o6issp2B7

0sqay31qXDF6iVPjMhVUH5yqE7TZse/SYF6gaLGMdp
i08E0cyB7FLA1289Gbul8ccY0OA9paXYaTf9N1pxMs/1I59ImsZqY/vsxuc18dYvKp7lgxU05T2kXb/P

z3jj02GrKHFWUtIwVGhgspUiIR1r8NRTCtkaRGQ93wFBu9qtMZSlrBtCVVHe35aRyJ/UOLGl/k8F4Sgp

LOnK2Ni+XJqdNq2TbodopGUDYnYw1BDu0AvkfLntiy
lfFI1y7B519zHfGFbTQRCXFduEXKb5ZsVS5Ym2ihdapFgdbwUDDx5lIxX4OaOodcxZaHVduDgGcqtBkO

NoA7UjPfz9g6qcJU4xeSVq7yHqQL2ATvNRIGyyiWarjV5aC+JUtcDwIVFzAfQPdFluNrp7mZ1cdMJeJI

9kM3NvP1JJxRdsYvQ/40td+VTzc5DFP5kclm4nksgt
mz7/0fGikhxFPapSHkrLvQjZqPHg2j3TxN0hYj1K//hcKVAK0U8QRm7f7wT8dQj19/6oPIGnK+YOXlIL

9s+NPJewcUlq9T6k0FHzzXA9n4St6maxdl/T4YAnJAQg41u2tDuHmarqMURaEYYiGD/b8oeY2fY9oyYs

mP9xl5ze9o9+bNdu0nkIXJG3pxr7umNW6alao662Xj
E/v5XoxslxaVhjtJHC9x4O+HwVz0+61d5r/4VWWgagp4X8vR7qz1lxKYvGHWUkU6cdsSpd2r2pMAMECp

7r5ZoB+0BtLNwEl05wkDJsWkT84PL6d7SPoqrsCR+zMhANcgGNnyzNXqYN6gfsan4GyvnfLrqi1FD004

cQok490b8F2LzFu7IkLb+lxdabAD/PSiHfjPxcWlTE
+8G/RwMVLlTfvV51vO6RWh7mEBlmbTDgvzSgicSpHFxujF5YEmH9pzzZLpixRZGy79jsqJRfQQMe/NkR

1thk6lxFgkpj1nQzhlPx8a3iTGFdDnNWdMBBI2KEwJxQCF5zBM7MH9O96wsoKtBI03kVKNoIxzRlSy+A

+l7Jpb13uFThrWEhX7paz3ZMj7v+W/HrWbqSxWCxVs
UmggpJ8Qf6B3xWbyoY9kJ72MerWYnCjj/VBKxll1aHc6LCqCwMP6xskPcc6wihnIjOeyQ8jXGFMBpomY

TrKnL6wMZCmctnLtpQsiaFSoglaXaOYKJ3EErPgFpO9xtFluamxo07+nu0P5JsaVED9U6n8bVmNXcoJU

IAZgiF0T2+7feuFHwCZdWAUenS5aRnMVhd1TvUW+r8
qo132oXjPxT6NG30WenlQwf1rvuEcGvchPo24qHlJMVaeBU2Uvy0iGa+uKCvW0EZsV1rmT/qL/uDB8Ap

zuo97l4XrNv2Iy5RoIxpDEOvGuybe8v7Alco0ka/sFTQ1vURUNSUVdsS3bZub734qx3vcYp2LNv7yjvK

yVDRx6fK3SxoBcGX2P8xnUr7+zJBfgHhaRM9UKi1C4
b8gtLhZvYDP92XeeY5g5BdKAvgLRAxIOYDxm84QZYUSb0GnTWhgLX8E5cb98hrAExfANdscBqpwFmdUs

jqgbkS78qc4auxZUDjE9RWC+f1fShxtvxOLLP0deYY5Bo1g77oOK/gEYnvqFQZs8oWBNNi0m/fMUMLxF

fWWZAbY/j0QCaDpCkY5pcdweXNQd8vBpeZkv13ZU/3
Azy4VeUN57ju1YnjA2ufGpN6w1Ycsu6BWC67z49ieMyrwzXZZyoerxql7GS5reeZkc0QD28ClpVUgxzC

ttueIWM0n/YP17SHj7k0I3GNxGGbm7Hybk9GmWkVICX+76b61h+XOM1Rv8dpCjf133xG//rc50CapM7a

qOavVYUWB8Qp1oa9YyH5pIy9YMa6Qt+gmgF8an/vSM
lSsO7I62ZH7n9+5p0awQKvI8I+jdVLJTbinIt08Ikf+tOIlMsSPByLWvmhaY2ZOtAAiIWeR4okUvVa85

97V4uFf0QCt12T3J85IBSL9OYAr6IyV/2yn71pJ/sKMs5TI03n8eT0JPKpUN2klCl0kty5ghv2FC99HR

XJHTN8nsevtcKscxsbOFYzO6Q/uZvQBP4WWgTXCwmF
8b0fytSbazjDRrM8WkdTm9f/jUhmhyavvY3La8aCtv8YUy4zBpa4eFVeLvaiE+3wqlOua2REaCqH5jI/

XYy9YCXjHFIOA3yLn/jVGasFzdOY2TWQW73NKqjDx1pA62xhRqjPz6sxzyRA/guHvfCBZa8Vx9XK521O

E+qL1Vr5UossAOuy6IoV6cciIKaVW4wdybm+WDaVt/
POdduAP2XQ0k3d+yao4J+Ry4g9yvZdULMWeZ6gxJ44iwGiVrzHNDvKdlq1mdVfo1NT+1BMEmUcgAD56l

+j0vkDOaHToH7c/D6rxpmm/e6h4xqbFNcq3zkAFMqjdNx7/CXOsAMd3cBjoAOTmAdBx7tnFgE5NBdX8T

hGTzWx9U1AaQ/JdUmFBmPr1udbbW8QOm7j/fQ+2bZf
4z8w4os2Wfy6vghZ34ogQmipuI8mXaYHkbNlN0gtF/R1rs3Otz10QADB24RRVXU7mUn3IQ2Lsl3ls8i0

jNaks/UkdxRyghoPkW7AhX1oE5E52uEbUHqWr33rARu6bcOY/55soEuwYyphDThwfkBr5GO22WPEY305

txJu7732RgJX8HOqXi6p7wZAhsYgfjQjgmCMtehi5s
vFg4AwaOwUVC/5tjmoYUFnCwdZsMDOm7dBxGyFRDRK2ZvfN71xCRdg8gMeXhemSm10WM3WF9oURBuwSv

thOPUnnCvT/B1jM4Dq0ZK5yC7nYhaYOnIQhai5e6NJq5T8qB5f+8J6dyY5B2ozqIBGHL7ilQDAUAun77

BnhlwVMmlI0igkTK6sq1rZzPHxgwUFEAtwtHEe9gSs
Carlos/L51FGsF89fD+8r1tNz7d2bEAnCKReFI03GgNL1FnETrU3l5cgWvfUunv4dSpZI4JKop2N70OTvgz

PljHSpYW4xAymsp2Gb92pGi7Qt27X56qq0rMsh/8oJXaeRElfEDhGRGmSDmOqntAqN/8zPd5bkLDtTyl

GrlfcFrhP3ooHc6LwN8gq4/tS+rDbVaHQ7Wo35RkV+
yrA2KqY00+5jaUHPVQ8kXxa9V6atN0vlFQde7/UspjcldPKQ9RubFk/4Kr7alPASTYDwFMa062u3Xbn2

kSgDZq3kSxy6ikVOZUDZBB607TfV+psL8kPjBkaIqBla9fSltQDT2m4w91jwLYcw8Rw03eQxSKbRxeQt

kQkCHfp2XFNOZP6CkQhsoVKnp2C3/FcA2RE2SqmUrt
WouuDmP1kapZqG/lKm6N+QbVvFTwmJ2TlqfPOLOg3YO2KNPW0SpGldM8rupgYlmcF8WHWdjZTLvxCgS1

lB3tvbwXgdyhbfrTIqb82BaRl19dYRro5GHVfpzdwzHtFjhYU/Qxh9QUKyZHXFCcDgA2kjkc5MEDQ/3+

8jANan0ay6ChnrDm6KiQ2t/bdqzQhc6wRKh4/b7xH+
Z5C4BU4dYaUsI96rsileFZ896WrCLuT0HXukeEFyW2E1MIv3daowHdbNf7HY4lODzD/EeUANx9j3pNex

67JFxY9sI40DSK/+/TzlAHkrefUFFqd8C1StzgP6gjpQl20aO/VVJUCs3Ymrsz7GuzlCprfR/vg7tlJ7

URON1K/Mugt59lNxHoPljOedlw5iAfRju4QTa+LWQM
Qc5yusdZsC3MJiC7K6UjKzqD891PFJq46NagMr93fFL2yqmjKFs+HCh/9LwgzXWBYV6pnc/4sLBtFfBf

F+7GnuFKLPJ1cdrubFgcjUo7pj0YpIlA6U/5PN5hcr3QvoPe3+IXlXOhrYom1c4rRkiQY9b6qkek/LFA

4NacR5UULoJClEyr/olx+bR7/+kRkgLCBVhQ2RUTT7
w9s6C1aHdEnfdV5/Taa1dJb1pTLXjlzjqQTyC05ErQDU3KfU/gv8E8zpTyvVr7qof4kjEMGaq4cz1uaZ

kYafmNKtO8BXCWQjhhPoCj2nh22sgmGNgMlSX0zjRLZBDY11BUc8iycn2BzHpY+249rV4hcw52jZ9w1S

ns10pQ+21tgENJz0P1k05uPNG7+XSx7SHw/Wu8ozkB
dpZvE9omtmPpeG7tdrNUnw9wnQS6fIlUCcOEp2ZxPZ2nCJyLwuYzTtVxACllgtlFTT6ymCYkHVlQZPSk

5FhjJ2VsEQr7e0E6d2zhEGlKGHP8U8iIa/vJRmIn3k3hoRIrZ1bhi50swf5FlrkVeprsUdOJ1odHq6X5

a5XVVpVZNx4vCT7ZLHhJHlVDCB09IZf8C3INsG2MJ2
3u8vEwgE6qQhLWSozCAd8XkQtN2htOwyFL9nBQ3jLliN17lalERsVdBl0DI26WSvCAL6UbEkt9c7iDdO

X9g7It9hnss3xm3uRL+32r4nJz8vwdEh1fdDWhAIhRgEYT/QksN2Op6hDnPt98VaCEpmQvWBgW3N3FmP

TXTmZ5ivQxsJd3mVsnwKbQcK0ix/4S2CEx4UterJdv
YBo18sB2BoZr8oILVt4sZ/whpjDncdax+yCHTLifutS7Fkz/yOoVBIthz43CrcKjYPWqp56cbT41Wo8Z

s/rft/SgWx26gAf7bl4Pervd7CSYYXIRVGJFbfqAkQ9cQoVsMB7XlNHdRj/8vpl9nhWV8Bo573Ge2Ps2

WJL4cBgbRnM0OH7O5htZv9MMw1fmjbHubUHg5U13cc
VrKQjj/fQ1859KPbjDYBoA5J5Sf3Cr+PBvf4kO6FBk9Ew4uaiHOWwbqcPYRjnK2tpXoYChkms7WpnZLf

0q1XXPA6vlEgI5cNz5KX2zOEOPMKwH99aPq/jPZrYtTJfhJMZCQ4MWmQ0xQnQe+iErcBB1zzDy530+tI

O/Qji3aBH8HrZmRNYgiY0UeI5vqFvSsMj2ndxmv6+S
WPj5ZIPH+x4BHUB5Y8lkyCfCqr905D/PGkdGZbJ4Yh02ki52tjbHyiNrlv35ASLXhSkC61kTj5iv500r

MrJiAI2mapiVMvkMGErgHCekbGP3RCroHsNFXtP30YzfQF72txPivUztZPxFAq4xfaupwDykm+w7TlP+

hbHUGsq8O7NOE1e2tNK/p0D+7glYcO/GFYUpndiX1R
CfrGRxKzEHn6JayIz2WT+4GeEx37m5/euA7Ijigt1KlAZgs7/zBLSAhSqGN5FVjkAxEA5sdTfiFTt1qz

Vc044gW00C1SxwA+/vpTCKqYHL5wznNRPuK+zEH4ttLUx5fM5Udh2+DYo/7+wT6DCUXfWxLR5n4hMyQm

1odF2rRGOsJOYnTYb6Z/hk04xjAnD13j/AdGqIpBM9
8B9Fd4bivOAnoXoM7SD00T3H46OvNHan4R4wsDXGsWHw9YK7AnPBwVLYqbv+KE74lMGcax9e4XqWTyfL

9rFIvWlcnmBJ9P921wyk98Jtpocf8Y7OzzqEVENlZJWmJ57TTYmAzKf4z5C5tznadeHvrMZLG46hb7gI

ZbwA6FTMEpjqrD3rWzc828pIL1JrqhpR4LIJFINL/T
WlzF7hYU8z3kfTqIQQvLEb97x10iyTZ/k6SyC7N6pr5fsZ1b/resUxs4eBnzwr5209UyDuLZMZG/JxFr

+5N4etA1buesggWmzz5yGe3o6SghG0ngPfloHRxahyN4eBCovrp9RJAl/X0AAMfE6/Qbjk4gDRYCTLUo

Hg3YHe3D0cCjOgbKYyMta725QmPHTuJETwTU458kxy
Wf2cjBjuIHgboMkC/A0f5VoPTEctyvrOj8CiDodfHSMXSItBYssvZan2aJh9P6FqFEIzonQTaE71B2Y3

c2+Pk57R8jtEXTJaEQ5JBsTs70BjvXMCnJfJoZnS6py8yKQfSuj5cAHbucHyeS/Uhmc1c2THBoPRk0f0

2JutkV/yU5E7eS//DGrHl97V5UYsYt1c+883jpo9d/
3FPhfZQo+3hEi5QmIX7Z0uKZbFY+he7H5mngLy6Lnkb/yhy5cbj34JwWMt6+GPX+eyvGkNcEBGN9Wpv3

K0p6sKVRM+F8p5gCMHiapYziAKNfmYMOYU3Of66/vdF2R8d1POMF2pH0WomTtRCwcrcoynuT1zCN/XV+

gN3iFsPe5yK9hIiPwZQeLZ2jc2XIfTVs7iEFRsVrLu
kaIPG3ngMTC40/Px1YBP7F5WhZCGVq/2rwJjBVz189XrbQ03ElgrE/y/zQx9O0Hp9Qe9YYT9qNH2U10Q

PnMGox4rU4pB5qGkQ2b9aq2THkU1yXQlssHsr8g98SsR6V4RojU0QyETi4/XKZ0lwn5DUnkRM29N7UnA

WT673B9UQP0Wf6EFyL8H9lkoHkR6hDsWuDyohTzt/v
lQRUPkjmBlH9zxRSQ8QBk9l2lz4czH4tmPnZkAK/j/ODqofq00FWgr+W0btwc3YnehRJAFnzW3tDG+8X

bXt4FbwuPD8XFE/rIYm0iez+Z79FA2dqyzsi1KOTaAxKr3YlMD+AwPl8K1T4FcsddM0aw3NkA4sPlzCk

JukoLocA8j3mov0R4KBN3aeY9kYUrdgstJVXTd0Ivw
VdiF9VNrIUJrfcq8uRpSYQTZstYEIpuczVKuKuJRzeYI+p5kffOyP28S2InAq655lLl20iEGZyEhYiIF

JSzu5T2+Agxe68kO72x/98nPjfDjS/FgTgi4yALdqd4ZUo3gFRCSjiZzJOYu1iSVfUE84Pml6zX6Rikt

4Xo8epZ4YMXn4ER8XGNgNVAqu6JpCnrwJhIDbojxe3
6UUBROEXF7tDaP5BYw5it+5aDkw7kCtEKsPPL6iHZTYWHyYBN2/eUN2tXTg4UwcwypUoQLlQyoj11iZB

zWxlxjY5WFW8XT87UZpeWnF9zVHMoOYFHtdkNqzbXtRw9HdGi3L9ZZiDJP5QWU9Q/8bJrgj1fhedgj0s

Dnwctg9UplQVHcvcY4KCMwtkPo60TRHpqNSQzgFiEc
yl3uph/qSlHl5aeRjxge3KYjZYoXGu3b5Ghpxwz6ID5e/U3bDQbkaLTRz9p3yn5RNzZfZGlqkRDUr9S1

VIaPiMbZmSwiF1D1jODt5cmvgSdBPcP4DK7EV2xyWzsChXjoHIbVix95bPbxaO5yvhgvJl4hkME2Nn1G

CLJSDnDdKSysO3JzwQqtYQl3joal3irhGiAyNLUbzQ
Zfa5CorLZMmF+cPYu0LFp1G2eTMAIMpe2IQC+wJuHzVdKksMfdUnuwJt/0SFNw6lT9phFJlrmvJS9vZ+

quynUGXW5AmGbplpxzu/CMKBUxFiuIHPGWSkzKSWHSURaFKpB654mWBskypM/ivTMi4NEvuIK9BXMkga

gJ21aR4FHeVuGRrMukpt5/m8CqULkycYKCBwcHBFpM
RPtmdWQtUZ4SXS8Mlu1yxC0jDeXpKAzUcs8PTJFR0AYUMnl1WJ6RNWib3tJ/ZDvUGeJnDr69Cl0+eeOP

BPjz1FQ2jiXa7+D1TuZS7v9n47ye6S7aKPl6Zriefp+jSYZPl2sGVnwe4h9bGv9Ti8it1XW6ppLuK6Ln

ia6oN12wglmRD86kM9x+xxJW3swhJjc5muD6jm1bWr
eat5iuD7zvqwAdzD/kBto4hvSEgqWLwFwdD4BjCUe5R/LoYKzn63OD3G3xclTdOs906g756aHzP12Ayl

tFdTaa1mieBaqD/Q5oGoSeXuo1TPF2PSd3IwC3VMprRGvH/uUMrcI0w9HSRHEFrrN/smHsNIvP57oof3

11z+bo6jgXfrDUO/Da9EZuspeqYrKCihsdkTymfTGX
xYorXuQIaDDrBcqKKCH7Gvs3wqeg7EPAaOi4YUprydDyD+vgh2x6YD3O/ptWYtqPyw1Nc+Tjwi5Cy3VG

8Ai3y+NEDtmMLGl2TdlXMjGCYUbBWy0fxlrsfU9FIehELtwJXBYQuChfR72vFQOODCfGD+SbR+dYYoHa

jzJNgWEgyaP3YBdiH/nLWZqrCchXWay/j7u6YVkc7I
rwGEYMDnl6nPty7KDtbK9Vmj90ebdJJ0oHXerg5GXs5aon064GEq5C5psbBqk/yLJwYsq9nhNteOXys7

ip7xPxVo5GU27BIRLg4lsHt3yz+bx/34o5pQhyKj3UJc5P6P5zw6KCfkThfuV264IVaMJusYN+P3P0jb

9tTdU1hQUnszwkj/aNuYntAwFzukmm23RTqu/ZrpLU
Nqv93V379sb/58tOp/v/lFQ89k1O5Aic7l9OczNT4iyz4xn8a0f62WfSzdIzoYVIXCgdnripEE+zeUYG

6TSUPcpvQ6epBzIWN3GoOvVLepYf1IapoWqkMQ8s/DG9iNz6Gf1xrw7MUi73IXLGnQ3qMPei5sFufi+T

C/kIVBW2bI5fP0cjAhLzWNoq64UucmgLf0hyVf064i
tL5p8TJ33BB56+aBzYVQaHQdoDlNZqjEHQ55c38CKJSan8VHTFvHY/PluSTyfFhLfVAPdPmHxyb/qv5z

FZvR944SHdzsKBjKTLd45Wm2MsYM93MC8vXgeDeLpTfHSl76YLd+scr1GHKlpHEUwyLctUDOHg7oA99q

3b6kiikwvn3SZhbQ/Qf/H+YavrBHwgZ7JmXG/HYoY6
crdtMWFBLVwjgZI1i7ZiasFp13sXjJj3642cF2Gq3rgqfxbUAy+1UnZXItUoMj+fLMQpG54sW09MvAEo

NFJ3dG99xngU8mNpbZf/oWlcH11Wkw+3cJESqyDBR2vTF2oDnedU5ZPxJUq6zwDL+a6tJ7Bb2vEtErRo

5J7GDQwKy6Q9HgMUXqG8rl1tgiljO+hhMFEkQK4IT1
VJL5azE+4ewMaUhNXOmGAXRVrGBpdh9HGXI8au2j6JupXObXxknBtisllNJZiiwy2D7lItZkI7wFvoWN

gNp+HVhkwBzpTfMSHXcj4dwfwTx0A0RCU3QehGZO+97K4ujDOPIV4xOzieDK+8dh4jIu0Qvc3IYLbPVN

4oPoAH0RaNVmzqcxRVlrQEpS994W/gcfyI8/NGKvHG
jo11eciyOS0U1LJEFqIqTF1qy7BMoLDlglsBFgjy1nM1xV582TG7V7tmv3xxm1ar3z7GaqzwqBz52q2+

ELO8zHn+wqzKqfgMIsQdziuO08HvaCFaJGZRS1I6UjMIMwDGqIh7fk2aMa6SamoGjpIAb7GY85Dm4/+u

KP9VUhf8KNowLOqjvXUza5+UE11xnlSQbdOS71bHUT
dp0z5n6w059kFxS8BD//0pyv6zI2ChJxossIjoiwjsEXsRfDzcgHTBWC6KOLQyYIsHRPh/KtbC1o+bj4

F69eq7kUtTXJhB6wMO/sgwm6KDqz+TVLwUcxjp8T8UnzrA9lkMOa7DIjzuBkT2oB6Am76zz2QGg0Dbcc

XdMxZ/qyGEmhrljXWr0RgFCuu31Qtvwk16qxbzB9db
1xpEqX/fucgThPTLidai+4PkQ2+VRz9Nqi8RDu7vbKPhEyUjHYHiZfnGIf+P74Di8hK3h1nkuxFukLDD

wdR8yJU+cQ/4lOVGgsns/8i8Tae+b0fXdNEVDOluCUX2+0sW5bk7Kdi25Bq+e7PnZoBUVBVN59om3mVA

fFc/83vgOK/w1ZTDI+EbnMEvTZokMCQg0jz727Kcnf
G9C61fR8CSqsVjOGfPyT4K9J/iXLGct3JJp2TnKs2zO0e6YIhMkd+TzpM5yG8rHcXdEZyb3TYCdJYc3H

spBa9MsfU1m4f9EdFv4ZwuImun3pAJu6S/3SVh81YWVFaGjVC+p0Biudvu+jJ3ymRQhoyFqmKDDHBb+F

7Wg39slIfm8F3nkkdrx4XVbqATvNQfPzOZbYApa07y
lsxOHtUx4MqEII08Zda2uQ/2+JEiDNZn5NxIOLvXsWCmP5FCUVW3iB5DhQHLMxD9QnTzRHWlfTDT27T/

8ub7+uMggTnsBl8+8yNGtc/OyvovF9XTL39pv3/3yOaaTtXhhG/pTd/U6Vypi7P3rxu67NZbQba0ke2b

CwRoh0pa9kH3J4rIp+FUMKQw2ei1KLo+Ki+/U3TKCt
yyqLoHRKchl8///JlHTrclXOOi9QoNRYcBFgV1DlJSjgZhHoTP092+KipNHSeezO4onxjlt3C6acpoyv

2nvPI4xhXNj8FjGA8U76g425CD3e63Bsx3m/GI2HBCNl4iqscw7AhNFOjD4+PU4S+9wHec6N7llPtgIU

LhHtmO3TZPwG5BI7Smu3fl/mc3F0n6F6vGKFLNj/Qk
lDd9dNUpBFmGpwKh/ZRNhhEsG8EpQSOiYDEqwwknVIcpMcwx+QeGZh1X6bNqZGbjn8SVLifX6RArdN3Q

BPk5NFGoWa6pVXVwPrFfcUTutVGc4W1Kjd+dMFquGrp6tLXifnhuw+N9dEJdV5GeASS9KTMzAxxwaSRn

M06/yDNmt3aNjgh4LCBCUA89XcimYLhvlbSX23gGBk
WWw95mRnfCrqVC97iNnr8y7Ag+qEjxyHuGys5YxY6ViOCCmQ+/Ezn2G3mFZQb5Rc7CM9ipcWlQrtP1pP

l9JvIea+eRn0i4lCowMsreCXxck3cDUApbJP7rkeV/vS0uoC+UtYzTCAi9zW1FXnve3AfvT/c40rPiZ0

PHTdJEcK0oQA0mfAQA+6YizhuZEbJdcRCbvMpRhOYF
p5rS3pIThiC07IWZtW79uxnA2WDY+YKNtNZkilW0APeA+ci6vfTpdS4v9Gvsu+gOUnzuNgow12lCU1uo

XoF95gHzt9g5mA/Xqx17+jZMNJc87MWZI1E2wnJk2OS4P2yH789YooboKbfnm6r5QqumKK+t+qrTLVBm

ai7sYdAmC9aanFlcKuV/t//OwoupFmh9lUm/r42nDp
2y5qNCU7D1my1wtqPj8uXxk7GGMlMononP092PG0g7yJ6WTxlI/Ez/9PtQKmr4CW+19d+0+nMtss/x52

rXc9Pik21VXOG7KnbZlQhubKQlTkq67sgcBJ64DR7Rg64ypQZJpJKVL+qzjIJK02cMlduSyF8R/fmBbn

TdL0sUkk93M3HdGLT1wRdXxn/e7piSlgajPIwm9xyd
9oKjsvvYuNO6hlpOpcyu5eEFl4VB/RYozjMTa6pRhZV7vJEttGIzAS/hDn79tUcYgS08ilQYeZwZSEkv

6/zPRvFnP/uquFlTsq8azDZGNopV+HjPcYQ8nbiW4SAUI/Q/9lOwfUMIR74CsuSDRDhMFqDI1mcHpl1Q

vyi/sTswby91qc0PxnwYY7O/7GNwZjF0rRV41IQAgr
F5DYYUQat2qrLOxHG0g7o2g7vrRNv9yCsAzaYE9lrU6CrLwgxwwE3fnzjXR2z2qeS3nT81wV7JM+/kkw

wfkfFejnjI+Fj1PxgmUoBBp3pW4ptH/UBFQsaG6JTrN9JUzvHhzYOsk1gHlWVXF+gSAOJ5Ncut/4e6tw

xqM+emQcWfDlMq6bMjFFzRxXU0SKUcRLNKxkOB5k7y
Db4hlUQJg9jM2mNgNeF1suhv6+m0ziugqCxtGb2G4/xYf/ehh4yUusCgRdWtnlfsRerqs3pKY4sdAYr1

VJ5/eEbfikrxPBjvijjIEZTUDQB2Xs589CGzMZvy238LiSAkZfZaQelET4EyTcBIm/Ie5L5Oz+nmwtDZ

N41EryY6ZkIWspJF/jM8IGAAW6kxJwMkFK9OPqhzeA
ZhPmiYMMzh1/YsGPov5pOkF1pwgk4SkLlES8UQs4L5sAbWfP7T1Kb073zrfKcxWcq8pddA6kk/N3UMPW

q7dMxXMlvX5pFyiHxruwlXO94ZXFX7BqPl2MFbl1FktZDVLD1IIEO6RKX/xJqc+ug2ZqM+X+Xd2FC4xz

4Ahe5r5LoMYaBjLrGcJrEHB8l0SMgM9D+z/8QL9Km+
RVBh71jUEbDDlKVP/3tWR901FAp3L8FBsUWCUMITosKofMW6w7/8W803fS+Q71XfvO7BDr9VvmRk1dS3

s5kAf/KYX/k1VuXdeSOKylh/Dil2uKnf2Rn2QvpvC5wTZRbloaJATc6zlJ/sht8zjbGV8vj1/pX9T9KC

vbkKniVHrLeAgwpicBmlrvnl7490tj8DzFgFtNTRyM
kkcl7nqMACaLqjBP1p2k5fyt+J/puj0Iq5nz7l38Kgfw+/bEpxWeRAn1p5sNeM7YCjE6Qmt+t9upfgub

0fDeGoY4M/7VdMiFrtnH8aqTC/t4ATWeaeiEcazij3W5cEqWMV7v0sYo9fqZEWHqZ7L/O7VDGiZ3rHD5

UWqqf1ZAQ9m9VLKrMNtJ0zy4VmNZPNR/qoZQAR9pu/
J3XV68Eo/KhpOXvia1cUDOMA1yKw+Q18DJ0OzZV72JUGxj/Omh7rYAmqPTvza/e7GOHBprwl9h+/bsSR

LZoYay9Io/+nQx1oxwv2DcNlNvuX2vH0W/IvWmz6jn0MdQT64Dxk0t1TPW0Cj9d1f8qginUcteZmawii

pDKrGl4y6uwhHSi/E/PgLJBYpl8Tf6TEiWl6eFzWoJ
QW2PqFQnYymY/VSF8TXLr9G5LDekF1z1w4keS1fMg3fHQyUJABhSyW3i3cb1NMLC0lFLIwy2cPv5RgGW

0O20zehp8lXRUVPjhEjqS5jB1llQbnH+7jCSNIAaQGH/cAu5phfgEk6VNvLQ4sAKhtIbGYv0TEoG0fVj

dTlLAfFOYjRZNG4X9T94tSI0i8H6T6m8t3TaZrkN7I
/q7+l4bgYOgz19aUZsF5FsC0k0ifHmgNvkX4ZJHeCJg2Gb6vrG5JLC6H7G5Y/EeS/sKyCpgwJu9PNO2n

0Kxf+zhX6sHfPM5iNeVU490irc6ZeDCRZneBROORNKeEDuKePjgYqDDP/h8tKhIFDQG6cbTxgr+98yVG

OKNHg2HxI8PdLvKgt4QENkD6fuAFewiNcWNvi/V9va
2XfXyLeATIBOqemoeIbj5eviOGzALfbybpU+ZU6jPBwNQhYaqB6MyB1eNEi1yjHD7AxNj8yKQECUgsKk

rBzFIyyiR+uWq7keNOZ+ef4gsJ8f5TCNgyaWo/xQP9JvxeT4vYI1fVIIi0V1DpuWOZkzp/arQxiYkPOi

zmnG5XRGasHwG59ZUVheuKI0G8/xzyZcZzV83KyqFY
Ppz3+g1r5yYpgZzKMeK8aeiyOyoYi7zvq5Y3he3FLXrospihpSgoicMXZYrBarHSDt92ZMoa6iJ6igOc

qD8bD8btRew58twuam4/wqdIsKDhU9vsa7uIUwhrYl1eapxHChgeAq1/IUdgNOZl0AC0xMFkwVesKc3x

vpR3m9Mh7Qutab5unHCtHmbKAGG851/rzWWZOWLpiq
oljux6orFs5R1bXZHl9ayZQtqB52oWIqUqb+JdQnbHaH3vFCvwVNU9MAg/68vSBaF8oIFrHl2mWnfbER

KjGSMh+6KEtN9+ihp9hJiVaWPSv9bNDNgqinuDyRorha7duzOPxd7cnN9PxnU7q7gPnOPAjmNS2qMQ8b

reCW9b5+zWs2Ky8TuU5YclrV/iU6pS1PKLf/H6in9t
TtfEEzRz1bFx+yC4lpNBreNhILA5EziKgswmLtSb3UkYYqH6rd6Dx7iKpHyEn27xrMweRw87JWc4GZ7W

gd3V4g9chn6dz4x68hhIjbcPu7Rven4ETpJp/qdS9fMJ8L4ZeT8WfGeZU46UitzpM8tWW/PPRlk+4PFu

Gl3GSUeM2u3MP6ElpqAkYBQvj/hHRVfvY4eL5QuzSb
Ea4dWE9jAATIwSjmbCERIr8E8w/0tRTMn9j1mWXKDUy8839UeQuCq0/orHmJgdIul4K9FXkwONcb6Agl

rsRjmYjYBm88agBh9g+K06+AC257RKhL7sLymsonJD13xFj1ody9T5pzc5In2m86jx8USzihmIw9DVa9

zp+dgbiHQas2VKMutUJCjSZzrmExVxkMt5rPIdfyTD
1NvGTtbqYlAC/oMObnqMK9TeIHYS50Jqm7ZjttLupaal0mQjMuuKbHH/VhzNg9gCcMEI3pGDCEOJJNz0

bJw/wW4akcpB/LSeXz9TtVRXPeGfI2ANqae5zClqbwHtOL7roF7PcsjtNVk7YBGoba0XkKAmAUbLYgRG

6Qp74/jG/zzx9DfmstNPn1VbOMxGmgmnG78/xK7NGF
tCj7EDM3VRGnz8LXiSm4aeFmN+s2VdDAncqa+C6fEMRoe5jAemaD5uaPmJG36vFZ/lL+PIQ3bYbfGXUh

qI14apHtYvfwHeESeoPVF3mZH+2GbfkTvcShdPDCdkkCzz4OdHlE8++39HukpWW3VYJqsYaLIlQifCsH

UJRMESb94qXEmhIhNxayBuGAunRVz1t4T7Uc9ZmA8g
dpz3B6iL27D+h7TCAU2cNufdaHAGMwce6D2muWLJ5qyAZV4XBied7vQFY1TCstPzdiI5j83qaO/mGg4L

CQ8uYe3kMuSTs/h3dNcAYDqEfdz+TIjywcUdlFpfSk5W7ZuVwsuSHbJ60yyXTpcNXAtDv6hVjUHODNB5

8KEljcPVgoWsYBvNOmkK5wJBTnt+PjYDbWfyvSoW4U
9HzoNPu0DVpS8lOM099EvmyXP71fyXDGW/GYvKidkm9/OoQ2G65tw9OtswxS84eFjoEsdilo8Q02A2Ev

uWkKk47HUVDOvd7Ydv2nvFYhpXnJ8fbWAFu6nZpE4m9sDsopRwxPIcu4n6nlJ7EeTHgUdUUbWmvE/Xfj

QDDsz5vjXoYLkLBJQlgKRoYjfBmzjvuZAzoU2UbdDj
BGXeya1V+aHH+kyJ6/4zaIpy2EpVocLbT8mMN/VhQU37V11a9eVxmZHrfLeBLVMv8Kl59xiI03nWWpyC

+8QLGFXzUzmx5aFTJ/TnlQ+L18i25kgj6XM4omq+VJto00wyuYhr11mm4BIo8s/kclkbWKsvFTVl4zd6

WeOYOvdtg8+ZR/AarURa3HYED9vyIgynRrTlLIi4le
9sFD8kh5wfSKfErAjtQ0KViD5rTtj3GoFPqBeLJFe4JDB8oGPsDrWvlR8JG4nerHYXzbK8UN8kKuBtUR

9sNN//48/30EcIhMZ2brEYuPZyDBzA9uasYJhpk2V/VOF+jYl+Czsn56X92HQSTWy5Gay1xuMS+XHlTV

VM3ZNyCPCD6PL30/nwSnipu8nEC6cnl3GR/ymAIrTg
QIBeAnpdih9uGUkYEkxH2JD2rFs/U4doOx83gnTQ9dwyedFTOpa+uzFwY7u43XHjuDwaFm1jlfeh4Zl3

6KDIaJki00Z7EVeiMrDvHoHu9y1mMNis1WYlPlq4S3k+1J46bLbResvlAJcz3Rgg88cbImzHZrDyiO15

2J1HvfEzMvPyjme9A59M1QLRG6j9V63RWATS7tDy6E
LuzbrkVwAwvxz7208cTcV6J5wvB/11DUWWnbSnDJ4+hIlyhIGn2aruVUTj/S1/YlHxwgBXv5At3NWirP

bHDwFpzNPIP29MruLGfoXbnjj1l4JQDuklerkPQeYba5I5ZQGIvjCOuIoLAfz4ZZqyKHyXbB1ngEyqKF

De9H/z3xZifPWDo7GYLMXi1yRBA0Yc/WvRGh4u0JKc
Yy7U1J28t1TLxo+D9qmfYKjXTeNO39Cs8hte4YWQ8bzbZu46br5L+Mjkh2R0ent/eOzALAfRhWOIaeVm

a2/ravR90/2Hzc2xg/iMuiu0ArOdaT+NotRXcit7GgdonQKhdnmN1NN0hEffJKQF9nTUF+nLIDbgejmz

0m5LLdKbYcYaqBVZsKflCH8sQ1al4TIzWFdH6vtIv8
2w3QbyLopHP235fnJ473XhnMuDiHwhszZG42HXqTZL1Bs8nVfrpF3hTGjCPLl2X8rX67AjjEPcLR8dSr

xA/J6txOlQo22IcF4VWg03QOcqIsggdccp3724P7M18e3AYgmn3P/TZ1OokXThbdY2rJkPEnpw9pQiov

T0NTJYE9wr+s2ZB5hEwqpmk3SfpoQj5wJCcQofazB6
WYZmIKrmmg24kgM3A38lTO67qFhSYG3HSEwR4N5+qjWjrFFu6kIqSSir4Ws8GOEFHhs0LTPXSh7xs97F

hvhHnOPMs5rDNqBYa0gtmXrSDZ8e9+zIUevXVac51h/2npk3A2+zsKZzQOureq3qyEyixZcLojacBDf3

sKdb+VTTXgkmsDURYhqc7Uz2ACL0lHUi6r7wry9K9u
VUjSuUL7fyLSJa3mVWe4EobBJnx4LjdPUaaDwMOnbICXcRkAiAG3g6d+mVgyooCVm+50F0gyxZBPfkgM

z75JN5l2cSd0mofvlXHqAVf4kV3iu9o8nK9ihzsCYvCEQJRn67drAEvu58TEeo57M3lqRojKvqIdQysO

pk5vSTHB3zEpBvvR/McWdmIN/WYXaU3q020hD8wuzd
D2aaJ2SeY9updoXTMe6RPCa6+hGUguUkc2HCJSKBuhxrKwmpq6J/d8hCScI6t3dEc/sOttadoRru2wic

B8IM3x4GiODKUd7QpzIn3bTyivd/GyjGtz/Hfmk3IA2yqXJwebAIBBLNgvPOaUzOBEYAI9Z+WP3INioM

J8a59PVoN+O63wHvhh9GjrpfejxxzCjzkp2+pUxu3O
3ysCW6Lviu+4Pgjr+h9cuy1quUds02qHccP/65XPzGn4oZ/KvYGWzbbypJmRX0SUN8Sp3EdxrAUUa3Le

9veMbQEbLrZgBuS9Txbv0D/eRRCK+4x3VFw2UeM9WmZiYBuhsUBC0/tAaOg3GffMDZ9gbog5F6tBDV/k

le4eBue6h3aRXCbHyFTBSe/GrHJxY7iWAo0yptLV6V
d533T06+z1fj4Tgqay7VaFdqGH14EwOJetGPsJh2KaNp8FX45fvxmU+mEb0PtuzRoOcF2X06djwHrfQR

SKeBzg1ikNs7ceY8PkbVMVd1RlLv27lDWnZTuqllXOoYlPcuSXCkzNRVmu+y2Oug4FmyLHdiw6i8t7I5

ABGkzvbNkQkub4h5HbBan5ZNOz97TWlc1V+x5tsrzF
U6wQefPvcuUNR5jepySrqLhtqApInYUGGcpF0Ayaxo22m4ACudQKVg5zayVdinS0rejpzJNU8h7x0w4w

kKsMFarkWIFDdRWpcBKOmDjoP/m439uFl8wDuzJ2g384KAcYEKPk89dhcPAeLbbYMvO1vfsUj6xbZLlQ

ai3XIJCDOjSScowxrxI+nTZei4037laulyuiYozPNz
AjyVeq7aUdWv4CnUb59w0p8T+xCV43lEl2N3RqGVuefz/0lcTMepCrTZF1a1T9DMh99Ynm/oyrLiOu5J

47+R2FVQO7Oxspebw96MkJVIL/QBosOdpQ6RndpugaPYiRqmjlChCrSIJ7kjxjalgkoWFHORJzorHEPV

l81KFRbKuSdwxad85PnJuyAA35IVhjbVtD9nQ8tzrJ
1P3/gnnkChnh14/IGdquKHXbW1tBo8+8kt1NjSXrX08S29gI9/lONXF+fn8EI7VRQJ2Z+Yx3jdWd8dIS

kTTYwx61ohIeynbJUn3L8UMIcI8bKd1lNOGYi5hBVksOazSpZqwmixxyAcV2gnbIlhumrqLDC9OuKzzl

YDtasfMGWgOo7iftSWK14EBsEsTCccPNzz+lxNAAP4
81epgM8+aLnQ6yd2D4/7J2EGir8LBejfMpZMN5ks/PKTP4Lu1cCBA1knCQ40yXKcdiDOdEeeqASZEKbb

Rf4BQP/iQuLxeQ4XlxQKK6UAPkmISlE1BwhXN4hdXRrgdxSTjObT8X0XuZ/Sw1jbbWcp14EHdUIwh2CS

vYEBUcCQemrU2AvtvAvWNTkRyOw6S3RdR/iCF0cuLv
t5PITZVbfuEId+Hrblv7e1yOp6wVGkr/cXl5bYr14y3+tY8PQe3q9D76Y5AOBeHUSt4VULLZgn/gARXG

382Kx+2tUJ/REjihmIN5tkJB7IzwYjvLf/MDLlgOilm963HJLi8srHFptvHYutbQ/gM8CvNzdHdBsEif

akN+R4pwlvGthRC9GlCzhErzg1fF9XcU6QyAP4wBKa
xY1qp6saNW/kxXf/TvvmAwV/tJOYbJGbAqFsZcVU7v4BaVRthtqpV2hjw1arr5Kqo8eKpm3u96rF6RX3

h/C2tNdepU52mDOyu9VZOQqOb+y54EBJxHyW7R2LPFOM1y411/54lt/xHU/yOxYce+dwNEZsxFs6Wov7

YHzPCZekpsV4MBliacIdxe2uSnN7Xdru0XwrLpBoLh
f/GrGMt5piDI1jt0HQw3lpc4VQnXY8jYa/T4HugyAS7dQqifY0sNvLFVudRg+1oRNwUfQPbRF9se3gLz

aHxKW1p/ra3DYfTSSIZXBmyEtfu5KGMM2BA604ZZ3+i4KT3u45OH0hcx8HZExGXzW09v92sn/LOHPuL8

InK8o8y4bEhnuwBnxj2FS86mokH1pIz2HA+Zc0yeuq
8RLLzt0c30xh/oYSDBtE8Qb+n6IseD9H+rUaD/hSgVhbzH0uDgaqZVTipEhfSY+0KJRM+mjc5fL5Q6JS

PPmiPKN/jQ/MURqbn0R8RSJG2iCZfT01vWnjph9fX79wWr1Jk07Gh0wdnWphmiGdV4xiWSbR8eQs7wbD

LhN+5KwhxPHltfevxU9e5o6whuXc0YXu6jWUUprBc4
IjvTcrabffkNQzu/Cyr6w9YQ9dhUrplSw6WcWGFU56A30eANbs47TszijiZW45kRTdI1yu5THKWyMYqD

s0pBZX/50UYdIjLkPW8F0zLHIhK+gnRjDYuwKz3AGwxisLYP9UJi7J2MD6C4R4xwUyAaMTjQt6nHSOGy

E9MCiVO4cgoqLlPlS0wnUyMBfx3xBbXrYFa2HWPaGt
WiW6Zjtkbjm+F0U31X2VeqFyQyqclp2A8nkMCgIJ273EJxCak0qsG7yXlaok+IlOMkRiQznWoOgIKGTT

ZQzNI6lYCGAO4KyUUpoEfxf9rD7VzKVNxQjBRPQIQ5CShvsl+ukL0ML0Fr6G/eaX/5cZdcH6PbYolb5y

uF3RROULlV2yw5ZYl4wXjILaxCJieQCFe+aDuv/msv
yRLHYv1SVrEuwQ9hOVFOqvL2yeDgsOhqj8HoUzmUaVL9K45y5deBz/1UjdD46B/XyPS6VsFGZ6DlotTS

l5B8gGXuBTFjfTu4y0fWnUKThAXRs7PO7AmpP06i2RKqiogQJoTWMjfDr+RivPUqECXuAeyAHW9zG0tn

jC7PWSIFS34/t5EddwbW2G/crd9/nrsf4v+EQ1DNJo
UuFfnNdS/oBumoJIKRZrfEfihSgXeIOLCdWWxbjKHa4Obvoesjtst91SyChZIEP+eLuWDr5VPephuS7w

Tu8VBTVWB9yr5uUQ/r+Ey1ZRgwJT3qMi1JNMs8eNih8J8ZzBwMGLhPCATDARXcsOiVJo6IDz1uySUXD6

GVYuuYPErfB9MrAa2rUn9TbtquSar2766tkl9iEa9S
3X7EFdGf3sdM4XgI/Lk0SvZB/zPi8jbh+9JL5BeCdmSKdxuUciXciNJ7+n92z73DNwNrAQgaFgmgSCcM

a2VC1bAOo6EmnVMKQIQFANWmVGkD+B3C9tR0w/Np5UJrlCRahoKuxlWLEAIlP62yTXwDEDK8pbrHXlEp

Or24DbKWp9+a9MAH4Gt8HCf9cf2dUp6EwXait0MyPR
HKkd8ap9VsVk3gDsmzJNWe3aGLPaINBjkFQmkNFRYWIgC4G1NYC9W0/USFREROtsFZ3VYImzpFOEhMHt

8R+cI8d7Fht3q1u4JUYCFyZ008y9zWE992ath+8KQwQh5slh4QBNwr1eqBtIh1yIB+t2y8b1AaFlHL+q

t872O7a3WCeZhUtzauS7SQhF3FYQ4ap+gCmuY5Mg/d
9uYxhkip8y101Mow3GWi/sUAAQG2JE4ZhlNQEuybGzqZX7jHNnOPlcP07gJul9nEbEgwsSip9z+H5L1w

p03vNi7vtjv3+lNC7Uge9UG5DyKG06W/GoQ8T0jQpedXx2b0fXs4GKW28MUl8cFtaLJGgwd5iQ5Mr1Sx

GUM+/HCv1hW47Mr+adFnR5drxag8h3MQFWf/WChXSJ
sLmZFF60LzUaIgAaIndS3yOdSjHv9Tfeavmi04f0Pt9Mei2WeO3oCxzWqVaGrocjOI2feH/KTLZ3Yvgz

WnBTc/zMl+CWTTtbTbaRJQy9UmhCY9O01nYRYFh8kOnk9SeIns97IBu58hNADlMrBj5TalDcN0WZhO78

5/ESIMxU1tcD/koxPiyXgMMc5kKZt8DtNU6E9+8759
qBbLH+TIcEmcVq8ATIQbZGDf6D0EYRqZ1L2PBkxtduA6tMDKESwbpsBs8PEVp0kiZJ163vRcoFdvnU6I

Jr3E6OUxaY5U8SuukXgoi4ij8ubA993JieJLYq3W3hN4aImNbA/eud9QKgMnDRPEIl7QAxSbB+mb7PwZ

drHRU9G1Fc/zkG/nKLZCTUwy13QTrlDGCEUXmwqVGT
dLhAeYkr2ilhVArtI0Cy2ouY6Fgr48wmFQFVKl5zJl+dhzUkfC7LZzXiP1bzS47+AGx+EiXJQKxtj5PW

btPs7I9x0prugu3L7VePcz+5s8V2TCOHYG3tIVTxCjEUVfNnu1vvHxo+KbbwCj29ImpaklPXcY1bZ3AB

vm2oC59D7YQVX+1LLhbbmHa/5DeXSDWVjfiMJm/1q0
CAqWzZuj3OK0TMIn686tA986+NDmwtfU5qkCzcKstioZ07mpnNNRTRo6O/LZBHcFHNPKH8mSKzSTW8Q0

rnqleS1jUJyJ6ZowSkPNi70GHPuMDCXb4S1nDnPTL8v9orZqh/zhltBG/0C8s9ZrAZnUCkwYg0GGmL9+

VB/4miWlpH2DeaxDsStI+wOfc+wgGk+QSpM3Y3B4l9
PpJqVOUDBLV+rRZXMpeVIMWOoIrlWf35TO95pceSdE9eBDXt3vsiKoDMMcSFgeFWX/wlJjw9MHR58Wjb

j1imBr1XCGTg1qOUqxyq12DSdxJ11kvde1jzffnjgagNx1zJ+V5dD6PDyCoCQa6rKB12NffeuIOzxtUr

tV8HJ/brP/sF8f7XiwqzOZhbX6iilmNwNboicyKla5
ZKUwKAYqPQbBc6rTg1EvX5qEVS/QUS/ChXfDYDYuh43FcmqM+XGe3fAfWeTF+SpRqJNfpEH3eC6bPirw

F21/GnxIWdCZVe6LljUpAhHTnZC1Fjhn8YyAr/9jfx2eC7SL8GZSHJ7zOFLugKfGljxCP0ew/rfiKeL+

0gs8YGDimcAXy9onusfv9GR0jV8SYJs/SYk63O0iK9
0JH+V0KaxWy7iwfF7w5tw3gbaCjpqRGgmJryYnQSeXa/rjCE1mZxj8dLzwd+U5VsaWg9rv9JBKQ1t56b

+HRYsB6NSJLbNi+DRTKKfa1EspMEG/PSjV8SWChBUGTxWHaNbyzSL4RsCb/Guhh4W1os+S6AG2R7gFJN

69eFbxveZHtu4/lM4RE8uvZLX+2wWv3P8/i/OlpwqL
+Tc/OGd3XTwXfDM2Cm9gxz1YkzysPdNib1ZvUH/DSdjRjLz2bwSCQFzER5w0wbwk1kzHKpuNzmp/jbWz

vdTiVCdJ3qqbMDxxpyvibEEdIdTZTrqhvlrgQGgZNxhgSVZbYWWdnfrYnUxOBIinc534tJ1PMM+lfs7K

IwGbXJWaHE3D11/QRXtX4NI4CJhK86qbd7Y88uUIey
jBqIZmTZRlm2O2Bbym+NWkR3diIP5ItKslMC6ywqMvpQt2o7tIXIRpaN45o7mPs75ZIsyXBrW8y5dYqg

Yco++TZHo5Pg2PUrdcAPLdd2veBIQz7lt//DJmv/z1S9hp0/WnsxKOxqSNmU0VWZ/JHPhntJYQzgVQG5

6Ea+qI8eEaPBTBCD1prHvsSr3LqMsVlSV8A1/HWME/
W/y88kU4BmfiznvFN/AJw/Ap5enaLhMv5biyRD3pwh/xf/lE7prG7na/uBICXsOnVy7Wes8+wFoabqrR

hlw/RYhPb2OiULcahsX33u6DR7SP5p519r/nSI058LHNPekuxgX+Z4qG7JXEMO1PYwC+n/7Ne5RHN11E

t1oq5Y7Rukf3+mAH8VKv33wJueydFKQwAxifT3LDRs
zok2maEutc4tsxFo9nfRtaVHk+XovurLRCP8+gwToQDaxMnNU354TADUkbEa5c4UHG3uX+ZFoQiYkBEq

eYR0YDh7XNw9aZ/etN42x0IqzYkoNgqH3bJixEBq34EnY1aTvdeLEsk7zxngTN49zBv44MeH+nZ1rbMe

IvRO/tp00wbzKLZlMUaAB090tv53m3wahc8zm0LKfw
CvLyS/MZWt2T5GKlgYT1BYDLrnMS/7YV0s1WFAVRuUX+C2ARrX570WhQ+3K6TbpqOpcv0tM79oviN5OF

WY1jYwYgGLqm9TWAMS6iAt6BWGjAQ73Z10sn+bQIsKvU71UZfKZDT9tMhD5tKjY0ECneCpnJ3X0pb8oh

sLaoiahTUTiHEBSlnRo9kXu+K1bs0WoVwGbqR+EkTB
70EIGBpvR1YpUXHGF7PZXArl5H+9V+7yFAxwz3Ynor+03MS4bZM+CaOIaBIPeDqcEBXeY0PPfN5pHNes

ZzPMrEcxzkDG2TZMxpIb+XdHyvnqsqQqOwQAhpVtFpa2uoZn9pIsO0NUU3Fc6pvKDfKYDyZ72C32qb1n

vBZSca8bmqVw6yN+wpECjPWjmAWY4h72uPCL/xdeik
7Sy0vFNc1q53B6sbw+rsTQJ3qbNWhICXc2HqNUlitrVh/WTt3crZ0trBxZQ1ukubyqIpNGMupSipZkYT

DlfPCo6gamYxEI6d9WYNARKy/wf2sEbxbCleWjCS+n8nqoE0SAKzkIH7gsXM3SD6vJ+2nDTNZtScq178

Evw1ECpR7H4+LuejGqLFJ+GDneHo3jMPskVAtYJNfD
52jgtPkWnkvnnB03RpgGhOr0GOAVPLRzjEyg8ENri2cHjzMHcaB4S/RbVBKobz93xy302+IXoUulMQ4P

TBi4uYrQb3TCocT9TXtqtHpFck7C6R9MJivVCaPqh7X3iqOKPhcn0B7Mh7/fOg7u5RRCqKr8+ZjmTfjP

PD4Ck76X8xFbgPKHQw1QkfKCz04SavQ60YCzQDhbXT
mmaU2TV/rXj9/uSq7fuABvK/72xSJZ4oEcutTcPIRanvPiHtauFW49faIzUqX/gNDULV/JQlBX8Kl40o

wyhq9269C5r6tVH0/w9L/YJYP8Jz8yZHF0qlxq3emAYz3C6U3+ahp0skI6p1J1v4izPpvv/nLGr2nGdN

doj/D8tZECu1reFGBEhq4YFyMp8DGPTYqP68lMzLFb
tJk/7oFWBWgjL62MQ2BwpDLLg5fYtuvdp8Up9YicDjTYqZMnreoK3BBLHkHzGWwVabvHV3FffaRaEqQR

ubmPeHS4UK2oI539vJDijxm/7MtYWjUfbWN/IyCc8DW4RH4q8/ZzZj2RyoPfTlh6Uu6tv9W/RNc5MNPG

nQIy5Jd0TFZawWhfgCaz98z6HC+uWmv6bEpSy6PyW9
B6hcCCzJvT57mMJ8ZXpfrgOj5GdvijxsNwHQbihQzNV0gWwigS7sAxDRzp7gLr+kKOoxrHWLTsUzdBLp

Qr4EWogYyFfQ4i+vhu9dgyVzFfScuFNTjm5qwaoF3wS+MG7QmHhhrDfY57q7qOU/xk3C+50w2eL6fDhh

uXSkIhdJF+aazBSdgZOofBuLA1WTpAadwbgwo4INTU
3iQt6OAhhqE+L+mir5o6bY3ZeFJsZrrQ9KRqxPGCdlNcWQkZdY1yhHmAzXrpV+XGYAPmtl/Jessica/yrepp

T5wffELwwhpkAX8QYBfivPDTKjCqelv3ASrZG+m9k5I8xu1ggWZJPWcr09x7DDDQopmeKHO7zC3FqeSN

nW4EWQ/PWCuV8es3yNTH4vE9SWuTmQnc7ixj2VI8R4
z89nrUnC6OYDjyVzHZOk8I9LgsIP3VlXXoUv1+LRhKj8OldxtmQjfvrc60GTorx5QbbC5Vfvckya6t0J

LR8VtczbBVR1BfS1nAFQrj63tj1oec/SEBIxpQ3hxb11fYG5He5jZnrzV8x6KzeaICPteyZ23uPA8Rot

81f/32wgCHcSd2hYTP3wzyhMgenTTnH1iYJKagBRI8
4IHxTP32An/AA+AZq/pVSjymITiMaZP4odkLt248SrFMoNtp2I6bLov+C+0rbFJUyCOY6YVguzsv3cYx

bkE4q29lntUYsEiCcodoYl+6wNuzk/x1gnSi1JvwYusFqpIvf37vrP3bquAFCyG59BVGSordJntadwnn

jqJ3q6nHmajTSZwkVSYvP6Eb2Q8u68lEpmSL7F7MT1
DoZRe/fHHEd2Gp37SUVLc8Hr8zb9AZ96SyGn+WBeft4+FnOUnKFlopd76vUWemGxxEo0Ky0qczPE/RQJ

ZkkWzlnHGYbeHOYuWemU9nsQAjhlVrojS90qzCAtu5CsC+SqhcqLs/s0wKR63vF17nt3NAJarxjnZLls

uCopmVkqBBTIXFAOcLYBaqDDhoe1LHJ5Dplyug/5cT
OSQW0nQ1ZC3rUn7/yEpzitZPqMtSTnjZpzwXx7tBB+nu5NaSUAFRPm1Dd2Rq8shu77NS1IAnqZ6ulBtG

cMjp/pYrS7MN17/btBitpX27QkUeBuGlty17CYR7DZaOyz6gxSI3oGDJpNSz1dhpPmWxhCuA4Y2otyqf

so86Bfp6UEJ4jBXG5Rkl0hrPm7W3el6BU2E+H57mUA
x1HBmdRbn2z7Kkt0kl8zckE1/t8ZRfXYOVMt2H6KyiurrVSeHkyjvdQlAM7xJDWtP7XRmrNX4d5nQ2MS

YTnaba/9tNTfbZg0sC/SmoX20WWoWAQUdp+0+sHwcuvsy9uhd6i5iuhIHU+tF3upy2kZ57MNEWwmlmUk

DadNQuGYctzvcjkxrQQH9GiuXiNFcTU8zqbQ3+kPN0
RiWm2glSxHZGMj123sbbGSDNVDV0Qhxvc8gJmNUvKt5fDlEcC++27K+iurCIh5ol82PvpZMzhDMdVgbK

aREYXSIpQZOx6gyctT6cqk+F4ILaQoM10QBmsYg2a9SyOqWPCJCo+i5lZBKIDU5yAQYpSt9UCApN5+08

3I0Ijqt1ppwjrBUD3XvApXnUMAfHj3TCpd0STpA8Pf
01/Ji0IJQuavHq8kzUP44GY+LbhmlRVN5utpgfPpFUuh3Dgn2y8g63V1mz6T3ciCDNlxXKrl57lC3stc

HarIYjdGskWoAM4uvQ/ju+8ENnPvHGGPDbhv4Kd9bGAs2BgEVQmXzP9r1MPtrk+s+jprQBTyaROOK0H/

f5ObMJhKpNZKvGlQWSDxcIdezpUOSmX21SK0oZPINU
skEFdCjmL7xadMY8y4zYx4JLF4Sucj0YzSOEIBMjslFK19tY6ulCrsc6B4xqnoYnzaHUF22Pl2zIsJSS

iKfEqmCEmo83m/8faRIA6yIYGBTat/2ZmeQMU7euWyUSyzTpt++xm0kyi7z9208WnoH1y8CV7subrAuI

shsHaYdsFgUMmJ5G2S7SH6QFkWysXp/fJHL2N2Fsi8
k2Fo+7Rqs1dMZ855gYpzcCydP9Dq9mU1KJc/au0Zs+5VQJDcDeukch91lkfBUCUUsj5HGSlJZuhVTdoG

OLnaTgsHAqVqg7w5t5/LDDwhtX54s9kX6Lf0Kw+C8Xn9dUl+8pKyyIjPYJH5gvK5nwCfR3o+xJIHq0Tq

iWuoQ0T2u8dFtj79akBN7f6NsoaBNx8z6tgiT6KARE
Ud+tOATC3Fmg85gx3dtrkpHsaXo+Kzt+rTWFN70sGldNSyHU/np+dVIuVYe8s7vlJuMYnD6qM8WJWuL+

3mUCSu8IgmbvLpZf2+tRPL/5dhQEta3ziZCtm62NJ4QCzwfyiwR3A3N5bsqryOF+YxYQNbW8GfICpF+t

4PKfw8+T/6qSM79aG5DsQajlFKqEXsJbaphJ5qtrO6
OTDmse75Xi1pWxfjeGWwbVpjHm+ONwoF9pEfjfuHgTqyFESh1/Y3pTz7oPFRPRdmOswzU2T21MYnZ6DQ

y5oVAA1xuSHq9jdmwQ9i/OS/7/dXZrsMLv8s20wKwNTqH6R4/37skhlttHnJDQbRbwTvetmb3AXPFzi+

yf0dpqO2hq8ZSjF8MDFctaBquMTl4adCwRdNkSW3KK
W6xK9f6dEhF0NO6h17smGvhiEU5qS1X8G+Is5LCLnK4Vaj2usrfFhts6wpTzlukQvZih4YPWHv9u7Pf+

rZk44Jz0SsELIoI8c8ry4A3AiPaykvz8CLJ57wco/VzCxN9jUaJSbvnWzoLoHcPT24szQj+6aVLKrs9y

js8xZG/V4msNhO/GCG+4kiJNHWOe0AmX+LXhNTDJvg
FxOA3gyQw0Y91d617nefh2n6rTy/ZW5aNuF0/Yu8znAFRnSv3I4dKJZTNzIztLHhxlJwUp1/0FxyHpNw

u2DWiWySsqdKIrPhOue43Jz2qYkxvV5qirdha55BUQm/X4rX6OymyH1xovj5/Xp2HtMbb0s4NkKOAXtC

zcduojB73C+PRmoyoipVJtsR5Z6YrKAc8huV3t3V9P
R/NMeK+mBKsfxWT659ee4i5G9e3BOJkiLgvapwHE2i2eialT4mru4+92oZnXQC3G/l8Gl+ZHCwHx8Zpt

SuSV7o+eEnjR2KxZjZ0quDTt354GZmLN0aH7JPKURPvmH7JpPsCP/WEvrepfDSsqw1NpN1vNg8iXMDWx

qATVrU7etr4E2vj/QTg2T5tky3HJ/M21SrKV/Ak/ch
5z2xxHRM4VL0d8nVjodpuC9F+E+3iXHWwUC/A2guEDvgWPEixKHxu2pptScfzufqG36qj02zscsA+vg5

4+2UX25auzLGaNMIkosc93CuPk4I8oA6FlSKogcuz0Ggjze+uwsNCiIoQFpk7ppvrrScomjEUI9uK3go

AM8JQv9ikxwzIoYPP9dvmsbsgXfEMLA92hhoEpB6Dr
i+I54pW812wcR6bmF5/EvxSAvHJlfcss/cVJJlbtmxMtCCelJY1O3scThz6QVq+sxj4iaDbq3qcZM//a

P7duat0ZEAQDL0qudnfTgVTcSPa+o8F2R+wA2LVbXS7S7WdzvuFmRz6VfBie46Ai4q/lzVrRiTA338d+

VqokcuLcwFhe3+SLr5Nti8KO7r3ty9rHN8b1cE0RJm
1brttTGE4hCDcMuRtcYCFwJxOCWDk6GChEdcd27I1QSy3tfqQHDllwjjcKxzgo1IfZhTdPdwPqdyh+q1

s+AjjJ+eCXr1g32z+nkH1ar6nWJwy3yywuZS/o4G6R+RQwbiNevnAUH33ThNSTs6Q3aojh9WHsLyF+wA

tsl7dlNVnBPFkO6d9qAL8MvK4J6lW+L/PjQhPHdQpS
vCfzRAe2T5ji2hQtYG3D828mtiF/bi3GLuAwUZ8phrn27TT4hn+kviT5/HoiaSCquND8dxD9t8lOQQUZ

alzIfQ2Cr/T5i1loVbS35fXVM1Gzv4EwBMxHYS31phzxwuE7su+Dpi7eOXBFj71zgTs/2dhrSCTfX+qz

ozc4cCHikKvRBih8khtDlrdrIpR+rHVxbi+BozseIB
a3CUbGqILS/fH4RuPlpwY/4OvR3EOEzLJhSnqiTKwqh9smgEMspWJ61GT6U7YjKcsQK0wChFG7ZQakS3

J86dKLdac7fXIAdjjrj5w8FDY5z82bw3p227Da9h+HPRWY2sCtUq28vavZuJsU7whse9JgKm9VcU2QkU

qkqll9DfHQZwfVBf1b0Bjcijr965fR36F9hLrOiJyJ
ZaVsy5Iit1US45U6Dpiq3WiWeNrc2+/Oc4VBp7r/nzrvarwn6ruqqPZ4OR9rhvID/Z1RgK6UQpHr5Zr5

UkuY7N/Date1dZEieRd5T+5gM6hhawDPCRi69GkjMCgqWyS+K7Dyh7JBIGYJ0ZFyFt+HN0VOsaZufY0P

A6ozNfRomv6oV0iG1qhgPP5iNEaLMSnWWMrAH2xPO+
cTmzWuMsUv3Y8ohmzy5wcmeRucpI8b/U6Yy4l+8rm39cNOcpB7Ab2nPLpYWNlHUPVvA5DCRkMssVWhyC

NC22QR0/iLemBse+d0acIUPTaNPSu9uDcyo/cBMMLzzwkHQQ+NwmyWK0N2HPjFU0/lB0B+zCCao2+C0K

pzc1CK2fjp39kWHjPBJhxQvDEaP6xNVLpNEN9YfqvS
PzXaTbqDZHWhoQ9jNVqfd5WfuadxuCbTZ2WE647zwDuWf0bWGBc3Y8O00W+hJb/OokJ7uk7s+Td6cHD5

vFunOty2ekuWVGw8dm/J2JwRr6Z9/lGMeveShx0m7aRH9lOQWTPCbilIwzYXMv+rGdRMxDyJtz7S1a/J

twzv5nBP3Dn9DIIdBXENwOfYgR7VLeycN4gFhfbSXf
8Zn2sfQHIP3iUjl5Du+KAbTpTmjkV6dim8UoiUqrftC7caNiTUUjrBNlZC1e0j1kdYMrN9LSSU2Kj35w

e0uwpRsTT2+DORSF3lC1CXlCdXB7RVow96ILU4oJreENGjdht2bLuWAVoY+DhucQ4WJig9LB0hyIJ/li

gOXB7fB/jxu/Vjw5s9N+B0MrhY8ndR6FPustyzLFqx
s+aJbScFOLoBY+1bmXpzU/w2pq1Yqtqh6BL4+ws1EfUIgw9/KlaD47FKuWDbu2jTpRGZHoeMYwmLrnMC

2zlGznOjnQgq2gdaEzcU7X/Wg2ELCHwcHgRC9s8hW836I7mGZrN1SNEaAjFdP4mnAmVhejtJfLLoPvpv

+MZWfQ2mKRDEY4T6/u9FXUV1/vR9PIhpcZDH4qnwgq
0p7X6DMMyv400Qd2yAltXsKHywFCn7ZvhFTIB55N0CbWgOtcl436sSnkkBuGzeqeSdfqjnKP1K3viusP

hwTInrn8UlW2LPUZ8UPk0i3y4861lNmqH4k8wV3OqFvEl8kSshktSjG8EXBRyqKki0IAL7Zr21EhxetD

TGfmVCaw7TGyvN04R7VC6yvihI7/M+7Q6wJrAGV5ju
+VNw/s8RtF548VpeKuTHn7qVa+gP6uPwcuqYk8hjlpJW7ZtsX0eK5/sYTy6jr9eKH/2VLhFzoctZy/Cv

b/taQ5z/znQWqqszpHnP/yU9QJ7bo+tp8MWEnmcvnTtq/Q3YJOQeHd1REQx9SELy+0NSYY3aWtFii3LT

k3LVvEUMkP7aXOdgTpZM/+Nr06P/5mGTjc9eBEItWU
PJUqpfBMXKJWdnLakRZj3T4XkZHEUqoIzGe6sCHpjuMO4IotXOJzp+rgLqS9gYaulo1We5rpFUwEaGfJ

CyupovdS9ofZA13d8fnd19mPrqXJco9K81CW7I1pB/4AMqP6GBStzBSROeo0DFWgvI+SbOy+vQSr4EkT

vOEhUVUW8WiQLnIv39Yo6fTgGQA2HN3JT0TZ5qV0pn
nooEA1TYcdXuo0mEVjUM4ZDDHEQvcIONTi1K4wOWT2yvwUm9zJgU1O7z22UXucALkyhCjzqmJK1/OUQW

vEA3njgjpL9c8oq13NDZ+lhKSdtY7vpghDAjGbrBMBnBDvrf9tyk643WmL/xCctZ21XP7LoMhxq2fvQu

LIfb6f+z4FPeoJWCxW7g5Wq1R3a/f0ywNvcyER2fVP
iL6jOkXkKnBldr4vFei0XbfWKEEPjPxGSkgYWUi7LVpRXGU2TMvyirJnuC1rv3CkrlAGSc6MQLLJdsaX

1/TwlllmiN0f19d24Xq9g8909jNfdH51eaxpoIhkl6C/pFX5xkB4JnZjZanpWNvPF0odFii6RJsXAkSh

60q2/r9Z0rWSfNIbrGYBcXHn2/H2tZGpLBHk1bqS1d
XSQ7x14w48ATmSRk4i8z12cDmwApatvX8l50tma3y/jE6OodOHfQyPkzP4ZfLIj+N4vlYYbK/Mb3JfeM

7FVqeMnrhsiMsuHaDhiLsyeRZE3pH9byS/znuhedzQi11llUc9jrSeXAkXpkT+NO/CJ+PgsbuDRsZVmL

TitETb5fr3Mfg88Jj+2/keAQZab120VccHXBZEHN44
ck7zd158QEOcZN4eO2Zv6Gk0wcAKC37H9eo5K83D6WqglZoh+v/7YKh0uUgZ0MQUnchmCuX6uQbCrHkx

obxC9bqfr9hi0d9+pinmlIuFb2QCchIyOzHUqfoLROsQXm6JxBrmaehb+SjAj/JRHiigPdl9umhqSphB

l5AftkuS8tg0vDuyx5JtezDQk907oRO3zIPupSUhqk
w83fEnvLDqaZb3ilIqdUROmCKnya3ZyzZd9VHonz1IzRysg+/be/aG5VSAj3aIV/kmN4wNWs0MIuzztq

Ve+7pVhIv4hpPopUOSdchb4FtFC8SMPHsLyMER9b9UytBQtBdu27aQ4gxaXrs13h9LL654zuEhjwbjUq

GM/FQnfP52v5GquSRL/mHQuHleJEOG2yH1rOBLUnJ7
oDqbPCBVQG+rF34eeCQeV60WxOQWKBPVbtwf3jJHMBMqCn6uIhC9dRH8ikejFX+fq9R3ZQFbaXYHxIXa

XQELkDPN4fbehTOBapAihKEE3yg9FrIgQtL9WoW7wb33/1gc92wW4oBh4NAjl7JRFFuOAWgIZbyt1hrG

Ya/BT+JNA3LZfkRuixD6zrcOzTkwLSfP8Kq0r3Br0B
xMQAH1sqAzJEIC+hzvomYtxu2aJ9VoZr3eT+PpPBWam/my5P32+eggaQAca2RE+eA0/jZTmE54gW1HLE

0a0pZ9o7BpMU0RekzH0nIRYEwp4bTLpQ71Sv+8Km6dwXmR77hT0Oou7bGG1DEe7/R4cMKLQONlALggMk

elGaQfzQKwp7656CaglIY/4+EAmdTi+zttmo0etABY
oCupFdEsrSrwaI3xSsJRx//O499O2eKo1axQJzDC1kh6VkPuFu5AIlDAWugscQHtCmgEp6KDBLprX5Qz

K3RMaIWP7833GgYnpObSLFXdeBasaUwqaCnPEJEtMtQfHOe3CbgfSPokt62cfyonr+0+8mQdNhCJKjtd

DRjSFleIt93W8GswbIFuGWosz/ZK0oIhla0pjcmp+4
q8b+Mql+1yCWfQfvjiQWwOo77a/IWeslH+uRA/7XxlDSc30TPBpbDFp6KDaJQcHHHt9+acdzLFjr6uwo

MgZH3QcFTZa4BEdxS+Q32Zts+e29JywhQZ+zEgU9IxN1ioANfpTQ5C6Gl5KiA8UaB4uoyJ1Ds5py+M8x

sTzoKOKxjBJDvfS1C6AwJUubiQWjcU/Az+7fdXOQ2u
3RbtQZVb26/PfKtomP1tf0kB9Vjx5LYHhOiuuaudHBj+EKoNfw6Qfd+d6B757Ohjz5Cnpyx+cCjJsloy

RyyAA6b28aXGB2GAFxC43vRnCnzWV2b47joBJ/eV3UYbzYYGTawXKndoWnZCFlk5wOuh9LRx43n3E/fD

w3w4U6soweoOftrbS9b2I5h4z1wVUQLBHjzeAFNx++
eLB7Z9BLDEzl3UoDN3JDFj6d2ur7j30MVcmdxR2DJRoP3Vxx7VCzTy2o9Dh2aWkg85wZ+lCiPdfq4m+W

YcHqrpERr7ndv78r0SP8sNmZsXCBmVGQjRK7NkeXYdAXbP2NkxeJWCyvVwRUU9SriMVetxcN3hCI6SF7

VV0uUDt+sFec3qV/E29KSo+t/XmOqII1sRS2LY1Se4
zpleH77Td2Q8etEZ8ouPgxi6xi8imq+od88p0KehTSorLm13smgUQ4LOf6oG/bNSt54i9F/0KBaWDPR0

LxEQDX+kyf1sE2tOU2tY88S5X8A4mXo4g3GNXLoQ0ou0l7HwratX2p/R5euL3R6PuNd8Rq/aWalsrt/u

H0kQ41Bs/CMUz8dEIp0Lkn0qLEJVNCKC7m89KE8Zx2
WC/LA2p6t1Kvt07UriNjEEyC/qVQNuMMusmU86x3ctl5YWB7um3jCef0nhKne945Il6Khjjachna0MKo

pxTOdmbQSQ85+PWyzMcmAPtw9uImnVUYAvJ5YfywV/rlC8HpaDETTMDad8GDMM5Gp9H6z3Hqav6cURfX

x9IH+HIgOXf/TQugRlLlEGa1KHg3URtBKofOv3vUrn
s/G3wdJUzLsgPRycbmKuXyO1Qw5kLsom2th7KvuqZspAI2iEb3lzg6kzVACZHN6OWGq0FIx37Ac6FkFr

jCm2crNTdtbaN4TLW4bvaP0WN9FyktBoxKhFmAiKu57eKRFQ8+XDO8s43at3vuAWliBMal/tq7JpddWp

Dsz0a0R0QSMu0hnG/ymtHGQnJMEMuJ5oq8dzkbvAkR
aK0m+FqxgyqvSvPPcs1OdGhpsHzyIZr+hBa+/aX+YfuqdOXTj4j5xRRheFH1J+B80Rec5UmZdKTLslec

oxnq69v6nM8uGcqGXwz4NTJzH8Max51QSuvGi0Bgo3jo+zzx1SJrEzDUNbN/VnlJtjz4YtSsaQYuJDqL

Xl3bRwT9cwKKPxPD3UDnFW56vBRQyKaYmuLchoUHis
dHOTz/u/AwvapTz+7PTAXW+avLj5ggzxypXlboVdx2/7xaCos/xT2O8cOzAuXLeYryakb5hQUoZgbjZI

Q9OL4BXIJWycqnuMidCqWY3X1NMTCFtH6lW3V4MJaZq6N1ZcgW6QS8Spak4dRx1UKDwT3q60JMAx3BJ3

Xxo5tAaZdjpCLY/I+zOryW9AyMtaH9VOn4NQop+mRY
grOgUe7y0FtBWOfKnQaC42GakHlI4zMrUvmE3L9YLGZ4UL7dtcoycNAEoHsql0QwhrDX+ZpvuZ/HDtrj

tvIq99yYPpaY+58Nk2iJmfE5HaJwhMFr27oqaPKCUUKkpDgEDwKDYkdEGDivU0kZyDvUV/hIIXv4bTll

n+NrRQrN1lQ9cWnWVsDYNYNcdBHcVFza1BPEBis5A1
ma8PcOfW99DiVuocA/yr9cI70ByM67IN9WJoXbFmZy/aR1JQaspwW/wqgnU2HSBn019S68RSIXRAemvw

yMVLay1HHohMQLqwwdUP/6y41S5krKOGiLaxrbgTaLkw0/QwH/YdcKbG6aaqySeZkJtFGITVx7Ehb3bk

LdGgobm3KRv6dJawSZxDPS2Df9yHqpNaxS8kcLEvUT
BXbkA+AtxS+f4E5HtFqZcsoWvsw4jBd8PG/KMMztO3fvZraxZFb7BZ4vewOpLOcTQpny+h7ouKyobAM0

mvyssfpt9x+RTOpd2T94x5U7OGf2KgVmQn+Dp3TZ9nUhOUtxbxXYpPVlGM4eoGn58cE4GBEITCcRP3yC

q2oO4PO+7150k0NEKj0bb5Ff83ZuJu1TawWAM6C003
UEI22zkOKb2+GXcqSbuZz0bqHabmZQPDHIURrHVvol2mFQ1v2jSeoi/PzYoYrATbcA5h12W6rSn9gzAc

jYr7tCP90nUPRj+Z4FajAZk3SycSrjK2sFMJn4CPmogitS0PvJ/G/v7wgEiyIt7lB0B0vMqlLjZwpEks

9+sBnHZ6ZeACT9NMYaAQpUxVywR9UhONeKX5xtnv/j
rRgIr4YitAAzqLzT7nYhaL5GU3hy51TFLtxB2nnOm+jiBRcriduCTG0hKX+HRwIJW7ZO1kn5MzhnZX/r

ZsPJoM7/duniBdLuRzdDI26SyN6IMNRox5bIqGcAcMpDb8hsreumv5Qo4ef1LEjjsmvwtTQ9EJEofMD5

w+2KHebHh61K77VMwmhGNV7wuZbbWcpLglMeqiuqKq
YJ3MPt/7u44h0O5bgARmwOUUHzONHRDGlGkqDPtnjpfUPMpcA1GXgCbxXwZ1ts4Diysb7cUZ9Lc9fmlM

zOWXKj51qg1xTljjcma820hqum/v5Y/0nkKU1bipop8WpwrWnVNHit8wolz9qJqCUSmUuxcM2M38tWy7

eEfIpZcQCSbofm74wzCGzvqj6wwvgfqPERvESE5OmE
4FmwYzxaTKgHw9cGJMPNiwqfT1Sukus32Hfy08Lv260fXf13PkJw9Asdc0k07A4ji4b12vdBOMvKz853

CIqrOc7krK9upm9EKKrFwvNhBB9EaJJkQN/3mbqZA/BZOM3Dl6B+PhcrgZPa2Dcse9K5F/S3vrSdCiSV

IdNYmS8XloDdTMT11LtWWnZj/Jf805MDGwLXmk/Fqq
y0Y3fALHM+UDFm/JxYv/yC5qk1PGl0N9IY9nDFqU2EzZgYREvAL9oi9YrlqgmSQvTd+AFh6JvWaBE/LP

C1/4m2p6RMuR4K/T7Tet6bEqy8YnpSNrN/FrmuWU+b+ALowdhqPx72Bwe5YaJrs72ztJlm6ujmbA0nNv

UjAhn5766rskTMjWnrU2VWpuuQ3HUx11u3HSsc8TRB
cucb8q1tC7bAf0JscHsU6XBgzpU+STB/+yjYWQ0J72iW7k66grbtD1vUkhWmUI2oRmBKoPlcFSmCRWgD

TCLSPnwj0tCCVsAAZqIuZ0fGLN5wNW34cTvUTAuVBIy4aW983ivdkPK3GfZw5zHptayyn7dM607J04j/

x/1ipsKOib6euhvtGRir37IT/noFOXLZxjXe0WhI/f
GSJ9strORgXfPeV/eJWrEf71y7csJTKPJB2Nw57e9c7AmjMK399Z3YPFbM5ayLfL99dL9dZbMTas8f1+

7N0t5tvaLpUvE/c/M+5/epykw3gCHtVmbhMRMZ8wa6+3Zy3A/Mg+wdExoCirXSF/hOVTjUOEodYU7zD9

Opr4J48ou7xIr9Yr330AJDcrbjhC7NNZIUQef1jdi0
uygAf7uyGMO/9bGddrvZosCBE3eaeBCtZMkOs31NWT5C7E6L2QWS2jGoQVh0cQRHVmBv8hyqeZwmAFY/

yFZnnQ2AOfo9MDAMXhadBGk+XkzlQEpgFHMepzpjFTsW/Je4wG/58S0sjIcc3CyWoGac3D9QdeyBrAKZ

QC9521dDPTsEwFUmMl4r7zWyi+D4FpN65mKfS5gkaO
mfzo7+EnnRQeKujhmA3ujEFKQ2Hno9WJFja+Xl9l6kp+BgHhLKwYG6qDCBwFxJ1XZDWzM4GOhJjlFBft

XNtcv52RR/x6oIpSUUPsmm1NTCH1WnIq67p6HLbrtPlwO930YumfszVYlpVo0XJbYnURJrqSEc1zuJhX

cu6xkwA9A0wqgT2zdEZGVwZCSNFNgIwx5ODuJx9c2A
Y0KyJn06D2T0VU09nOG8vKNTOIRWLfcn2TjzpyrAVjCrLD7y8iVWDCwAIv6MRx594Dgtn5teZeIIOL5S

yMxiwKUFWnRLAhj2ag9tlptRGkYGZxx6AKTp+dSraz1ru4V98gxyyb3xkwyC0esYk6rDZhrP8Fow1QGY

x55mh7Mt8FhlK2iiH3CYRrCvQ3UboUeUpPi0lwoOGQ
3HQJXxXmnuBLTkYjC4c+I/9YR00egVpKm5y6omi70EZV0lgPsT1d7iF0M/Ev6zTj0fj44w5T/okX5J7k

bUVkHHcfEixWgF3E52a5llXPZmC0TM39LbZ+zyM0Zv5lBNwMu5uFqy3e55xUwksN6qt20y8dX6ueLJ3m

sotbaXiZhyRS6pNDRbLSLsBCqbPedAiz95yBfKB98n
1b9oDEowinNTJ+5hqOwfujDdmzkwtkGwieMsdvfni7rQC8DyCkPCSvk8swahhgTSQhKJMil33l1cDze7

1K6xB+Nx5wj7tNQXlcVo6R3ZpbgyzvmGywzrxfoiQ31qy6Pwdg0RqHOXCvFecaBPFMjgobIddtJpPbXr

mVqx57RCmChNFSDAHFqLHsTX4t/RBHNX/zjRDPe9x0
o0os1MVjjjJ00DSp6vzyO2U11VcJGb5jT11h9CsEQ2S0cVIfQaAYelKiWc+MiaHP7JOyHcLCNGUOFXC2

zE4bOQeciTXYoGv22yuyu/UfH56ZjvyMPOzwsQRTBbUacrrUlnyWqEY8xhs+i+LZDGcVzUOmM5jIWOG2

QKx1eXb5GE+4zlDft/Tb1aHzEda+bqDKBFgpOtE+Kn
YR22LKG7PFeLDXZCv1Qzj1DlTdvx2zP8aOjX5SbyWwS0a2Pg6XKFDC7WQelvhUgHOsSMlmuh3Hnj9+/C

Rl+oU+mlLZm7/5zc2Pf2V0FfBEK7WPy9E/qPWHcc6PP9SQ+3cYoBkhYxwSPiYNhE5D501iXBsAw8Y6tm

5lEnW28MotZeCiUWFoOAWho4nPlCuKEhiuaO5+rpny
9fkWwPN9QWWv8/H6LxaCAI/EOezwgb9CsI/PqGUhsL0xxzklA5t/PXTzjP8WCCry2/URPcWhlyZw70XG

QwA0A3Ls6LbvdAAkU6SSTP+oLG4T2dkG+/5VVzY91Wqc+Bc7/Dn+14eiqtXqtqxbnq5BFiuDh/v+ouh9

sZ3aOjrKnubKpL/tOCNhL+Is8cAhehRcD0aa++dKJD
bI9i9LXGkzje2IUV6kT6IY2h8tJaIa571IQHv7GRW8oGL0Iw04rW3q34JUdii2qxkfm1jGsGn3T1XgJH

BWN+0TLGB5AS9WzbvsibI2beM0ykKi5hMAVidybFcSkCAjKF6+EeAYDSTu8z0aKI3bjWFdr4UCOkB0To

2cpwYDxx0bBKROBt3h0ZipjC9k5EduG95ZChZ0fAgV
PETrRcjQhFiwdIwX2jNCKxmBksagRjYg8PIvSgfIEIYEWyUghe0JkBmBfrzF2ra8x4YF4VqAgH+D6+ZF

8sFEZoKfS4zIKV2HyjAnxCStvz4JfyxOfMG4MQu5T5fJYW7IUFaHR4H4zqsdbrbhCUXxC83QBMtc0AsW

FMPo2PC/THrFgUXUB7Zi0y9dJhU2K8FXwIVneDwLFN
C/vFjEnzxFaisa9CelSyX8PvtEgtzPTvuOhHruBQiYJBTgjg1Iri+dDxjtqZZaemu34Uq1SUstJqusB+

xtPPyzbflYB+hgqZDxo4dJO169m7bh7IFsqOi7Bve9zQ5mdH0I11adZt64QPWy+8/wVaVhKlxY84aGnY

pvO/Q6a84/Ulr89k9Mu18Mq9O9yfdy8bKueczv5X87
LrI/wjr1HnpH2IRwhi3P725huS5uoyJlqxcF1V6/qvMlFQn/e2UCuqMbetaGzhZpUqtkjt8xQaZvhD1N

o6aZf4AYWwn52ZW0rGiEIwPuaFfj4lmkc91u6Rljl1Kuz3iRwlj39eTBwH2mxYWv4lw0bm/u8SRXmjth

fEXFUwJ+9vXoj8P/oLiUwgunB6j8dGq5hDK5YW/boY
w+SypfYq/W0QUCOBZUhMS3pBHvrC+WSZgnuDBwbEBhebsYKn7eCbvyUd4vdTput54RRiIEjYNfkQrbDo

XLMITZaVWH/hfyUz3/JbCD7fri7kxA2f55naZW84RWe+pGreBaQmqGYQB7nt1M2yrUrmbYtfsrYnptCy

7y155xnTQqQkaqztM1mW94gQ+zF5tNELH6l9wzGyDh
yuDIp7qtpZgkadmDElBjtsyG2b+YBcysHOJjx0qhBNLdKoFchqB8JPx92Ln6wcZQG1LUB62JBoff+e5t

H9gN3XK6y+74nnL0fDo9dk2FJIdcR/mUfzIIV7Qey7ne44MLUswP9YjJpHtQW9/sKkBx80IsTQrJ693b

/v1DncaMnj1LrLvb0c85/adM78MvKtIC/y7YRWG3L6
Qxn5NVFvEXID6KAz5ye4i1scuo5p+caaT5AxcRtgHts0hAxFOwldMUA8Q9/2L+4616Nlevxx1rCrRqtg

qMfnivT715ehL5RGCXrgM/MJCk5+jKLc4prSHoSRyvvGA3a299Lar3G3Nbij24I0Pxz2eX+ob97x+mH7

DIWxl2cK74HkXjou7R/a8vlpvAjlCxlCYb2GOPRwg6
fmr8fDwbQFSI6tBM/peCZl9EYfHF4HJ24Bq1oKcz34GqtMFyKouInF3Wj/O0IljW3OjMvZI+RnOBYpfk

xjvk7lesbavR8uxBgmjWYa7ua6sPwym75EDLXuihZbA+tgcNAqYzVk/emroiKl1xUNwcGmFUSu9srTEJ

gXKw11bPky0kfCc4LrN2Y/K99hLOADtPuZMCiav+Be
Y+dNSDEFRcCB3sjDkS2ZftT6i7ofLomoEqoYmuwFuIZJv/NZ1kW9BwLw+Nhm8w8+ng5t4i15V+SVmSSs

TVWpJmrmgD+e5aABW6GAYTL3uy8Fnfff7AuE4Qj4ZjaFhT3RFLIfIrdKdZvFQML7vSot8+X2ZNxYnTZt

/O2bvsbuuG7TIZHS8lgnMXNxx6gd/2QdSvDIDTI2KM
2jsT7JQ/qS4xOtZZ/UXSua/WaTmrwuq9NYxa1Me1wljkvQdwHntggYekEuzWRvdfjoOdjtMGt9fjv8L5

Oi+WXPiP5CLXtk5zRWzFMMo0AnYLuLL9nwkJNmUYpbDb76qD367rwDuUO+p3zxrk2vCu+VAh5GB0J1IB

uFOvoSBTh6yKKGYuW9FAV5O4MjMURC3BpyKH2rM9/T
+BZMOnA8x3nDTmN1DHARNT30O2AV/Yb8aVS357aih9msij8jtK0/PxcGGtEnDNiG+vhzWpTlCDv6yN5z

CNYNPC087nlU5auod5fCf27MhEXL/Dek79D2DZFzmuCASQSmktPP0/9Rz1P3yB4uxmnC4Jo7i5mtuduB

aC5dVfVmAtVYxyQ028MFd0WT5AkZ+W9N1DIEsrtzQm
YM71bS3NF6fq+iFy7IiZOSIX8jOlNXWQyj2XWOPqhNzme/odG0S/YKZuBwQiVYxhxpheq1t6EiVa9V5s

fyCNvP01BoYGMna+CHsmBFVl9fXPeQ1RhDSrYF0tW4Chljz0PzWNPpxtwY+nee/kcpn457zqou3/1lr1

poyqvdHE6ldAZ6cR8vMuinIrdi3dOlLDCTz/4b+jaM
VirbgNHbyznTSQM0uwfKfceyY6hlHs32H3fa+Pm8naSM2ohdo+ksStXu0P10PQrSKbC/x9/g9jFwdGIb

/4cPNARzh3rMHd3WumXjaaOeWppBvvEsmOTGYBjX+aFbjdNyNGsJ1KKoyaxjEu0HRmYWQEnN7WDhnjMi

1BFR5vHqR4XATP3KIG/jGwXcmMbEspZ8u0m/y6FtY9
Prghf6nwywLnh9IhL/gevDw3baVUh5hfmw9ObqED6q7EI/TnltCocIDljv36U3wy7t+Ao+gEP3QU3E5X

3GP7rp2adqaLcmr0WjNd60kmi1DuqOkUgVNMmw1GdA+vcrlWDxzcN5sg4DTClXPTqD5Y5TVrWG0bonga

bWfgraCx7t93Sj5gc/3eZXQV+hgcddMSW80g5QvVLA
ct3lCgKkWNToX4rZbTCI7c5Q5sztq1D8omr9rpawFgDzs7OItnH+IlwJW85q9j5o7pNwEFkgOCX87Hml

N90r/O6VZIKxrmF4+EvvdMdPy+zoeHqav1DmrSLtDwsgwQmuE1sbSS4uOhQMjAehO7OP1017QQsGlug3

I8Rls3shZPY/yO3Jm2Xiya/d7jxJ5dYyLMpfFTZaeb
eRZynSCoBRK5i4UT9Dy2ToRy2nhUxwVBOGGATUcPDw3huL+i/V80P9CHEPCCVrj4q76FbhCTQ8+kP3b7

EID4ei5QHUzsW+XfOeojIUOTLow5X6UckpIeNXfQ1bFpZLrStz5ItjedcKryXqbIJGHOmE32aVAxy7Oo

zYFqSDmhFmK8zHhvCRw2uIib3XiKL9gH/BSZclROOo
QjYw5+CK67no3DB5hv0widAMX2U7aMH4/CXqCsuVNTJzctHpTtDHa9xIrkvWUF62ukCIJeQGhWeycvvl

C5affxNLMdDRzjZ1Tz+PjfaV30IY9PS74ve0/BFSjJYixOZ6gIsGZUkge2QA7tgTwYHUiYKn8E5SZQap

8WlEDWQtEwcOa01ZHbmoL0i4uGPeHE7qIDqsETEd8u
03nROZbetpRyEOWJrdyuMAARHGh0NvC5zaozt46IoKtOZpTb4B2pORHcp6crUB7yIuPDOUbLCiGpmDd4

2In5EixnBAXKCS3LP50p2wLIv0JRPizOeOVc+7AhByGIsv7E5QITgY9DsbsereQsYYZN7uiTwVHesmW9

eYV1RAGIP5jz7h/nc/hGu9s7Dhxau3/xluvTu6ct4W
af5V1DSrOV99pph3MoYSCpk1HlXlgVd/jzk7aY+6ODaYR+a4wwpX3Rd/XhbeAmrrjrFG+Tu+Xu3NrmL0

9O8zclk0sgU5I+5UAB+hU5XJeTomhTrajarOEEuWR////ez9f82MnsMSa0CvbCYD33zk5NH18Uv7k

TwImcpU6gGiIWs/v0N4ykq0a1l0RmtMnkFuXhtvoAl
97qodYk5SguF2i0Qk7bEff1R4m1zKQlJfzuoUyKNEUSF650FZiV5cWix+mPUaLTVnkWreML12xp3LPEp

bsyJ0TV4PPdHTq2Qs8KoQJGtNrb1Zi5/CRP8/swos1wFCVEiGH6QX3Nfb90NWov5OZfW2aOsXJY496i5

NZ2y1oWdhTkicepQa3p79KtdMRTn+u4e7E2AzVlD4x
NXDWns/kq1q5O+rRakgRez0aniz3XEYbE+WUhndroGtVAwzs/Sioh6JvoVxJFKWtFv4XqGxuf0Nh023F

PMSu59tQvjvYwQeIShz7C2q2PpIjn3ggC0PliIjlTPICRyle4z3lVggAEzz0cgOhSf9KF9GIHITRe+9T

uA1eLwB6d59ujV81uQJTu1Lg5tDF3vPc2PBmqm/Qht
fnVMibV4QsBP+xj3wC7lGo3+MyV8AoTSIvjBMbOtRm73GMtK80T5CqgfbDD89BhhjsvjftiofIX6Lg5f

7JE82eGHNb7PYL+JEjmJBQcidqJfby+IWhATJ533snBEQAqE/Jwf9liEqus0wLpuw1bT9Dfk/tClz6nP

qXL4zBn8BMyPT4c5tS9XYMutkx/T5KYBmAqhVrIXtm
9q4XundnzFog1pmKDm40kiePpccPZLO/S27fjTVxyaiQh1N2X2X1ba3uCFe8Wg5w34TRnB6jtRgCDOTH

NhYdvmcUvEEShynFqJ8f+8vkE+DTuM+T6IYKGnRVAGDuLaNcIVg6prk3L0bFGHjqEYKrNcWBA0VYEDe4

cn/VO4llHQPXbCme6nYrtcDB9xxHUqKUJhjjoX4bXZ
WO9eGuLtLhZ+5sNj97LjT8Sev8G1zT/fZZhvbjMdl4qsDn94BcEqhMKN40NLtFSqA3jmBNKVDCJi3+em

xaCpcGxzbFZX4bSwM7jOKMRJSXY7XxFcu+x5Iir4wFLD9tepcposYKoRBGkgcfOfOX9c+79zpa6CBjZQ

cx0INpQIBkCeR7mwMXEzED6w1L1X1tGhdoZQKSZQok
hxlGSpXlXHgrpWgJ2KDBwfy4TTPfS6YCy/xtsq8ii+OWdUJzBIipn0tBL3FQGFFHHQ5SgvrleXKYk9S4

CQulIYEc6h+YR+snL6thrA6+QX/0h8pRKevzk3FH2hRryfcW8s8Mr8ghai72f44WH3ZF8sUZvBl2Y6CH

4a5Xo5k7TKzbi05h+vPEhg5d/uJwO0XG9C39wEvc7O
H3jutokn5cRf7bcaRfHXVXvGRQt31Hl4EQ//F1Q5kAMhf7MoOOgBUwHDSCRAR656dfDGu+M9Ot7yuYkK

g8+ZuU5RkwbiFWyViGAbvJ+nHQXnEmoVSTafAd4VROUB++0hIVw3dYgTGOhNW+bv29CzlzIKsXQQCIah

+BoZyKXv9pvVnqgxHVgwZZJm64PuLWXplAFgvqaPPk
vMVDzIk8pJs1oWS/lAmivFgC2hlgPY9NuqKwgcaW+UABay8A6ZSknS+Wa1ZjNtBTAnDeh+yZk3sgTzpp

Sr/pWeOjm7YH+kwqyr9Bqs75MHcrCfmoOGdZcL5A7tusdy2cCQAh4LMcAjfsgZTCK73p/h14jSUymWgZ

e8f+Lwdr8D9Xj1RT+6IF5Ua5Sc9MX69XR4VMNBiEZX
7owmN7XbfpDtfFdyZ/CZVDHX5uo8yK4YUeYgnz/GFxbmvcxjehCXCFWX3NQGvIz7OO9tvoBrxeFoynQY

u3QsGZI0EH2399X5ssZv5fJbsS2aqv5pIf7z5GGQ+5GTcIGOSqUhlPkg75xaequZwbYHycfx0bYCE4hy

8fCDK1QsbDLHhui163gO6OvcQsmiIdA0zarxDAwylK
EXOcC7mlL+f4N94i6fpRRxrBZlpxahDhdB2hEQ6deeMLHeuodMiSKy/cZLRtYHFYKwzQLswpb6jfJyZ6

OZsvWvyrQxvLWGsIQVGTxTVj/o0UNlXe5oCCfw66OnPq7St+zyk+9ySZPBU36YwIkStp2Kns9fRzgpiG

XHrfr5Z+lscei3R/eITi7GzB3IybJxbMSgatDp+7rO
jfc7ztOgp/3dfh1FwhHugq/SeHZsHuWFWlAI7hl6dOlT8JCu17M6o28iGQFefC2yGw9VghR6miZrrNpT

KvMEZR/xJyfn9Oe9Me/prV1tKDMhQ2kG4MFb9IKwr2zD4zolK3XygTVVwzz0Go/cjjNszi+T3c1C0sQp

bzND7NSAAUNuQGTdq65gLB4EMg2Pxza99uyBej0v+f
70WQlS2D6668YNjS8g66rcq0iCVW/SKhi49+yjf2SRlbGv/3QXqeMGiNHpyciQ6+aUQOvilsxn4tfD2Y

18zWEIzOSIR18NWWK5jAboQu7ArGP+rKDuXwGYPh3hlb/zTL9+TndrtFSEBordDnWWbcusw0BuLfZYpf

rO/8nXZw11AdxJE+rgKtr8xnpeR9hJ8v24UIPcStOq
iPRFRpEcpC2kEVgvwedYbL2BDsBnV0LxxoqQ2x+2FTFVhQMxI2PA/jAWZUn+Zhdn1gpCRu2wQZLtVgLs

KjF8JpX/ApgglJCCZvAIE9pG1r8Y24agS3M0u2qCF90Rr4a/ktYDtDf/KXigEvMNEFj6MYqgrd1UCtbV

GGeqSgSlUTnBeW8r3Xcp/LBwIFO4uVzmVbLpUV1/OE
LPS00M5e9lFe940Ul7quhKFr/Q5jUQKSNCbnIL974egcjDnL+czEy6JcCgv/7mz95EgucTXFKoVSQ5H9

iG0G3q/HAlKeQUaB6TctVLmPE/ncrUsU/QKDTBkHfUhEk8KAptyGpivi95xcPjuVuaBquUPHyo0KKHlT

vZl/FtrE+r1g9s82yfVFMNrYYMHjg/ekTVDjsx83cx
Ptbw2zeK74a+3Jf0nW/a7ZS1L4YeoI+dBlcruuAAV+qj/v0Sbq7PXfRVr0Yg4ivLC3Uuh9dmIPLiAT2H

RySx0+G8+J0voZ5cH79be16zOSTilAIvsnBE1FF+RR5jub+0qCxUxyC+Z8bzp/9ULRPcmdKq5QBGVLvQ

OZXXQTff1OvZDIZWbdaQaF2WMn20wbQ2mWlGdJrR/J
TJgEGZONCif27D1Z1CWM7iJ49WNa9Pr5M/YYzQjA8SqPzzatdaL1u804XwksX/oqzZbRcU5N+UjCV8Tr

7vvkN71IWGV5/aAK2pZroDIZLt7W5GRfEyzRGp2cCZTGqXft5dr4PlCj5+ALw7fDMZ/2Gbfn5ubwUtx9

4ryXYuONiDV7ugZhcfuk9uy/wgt8LvzrUieyIIV4T1
2pJczRyfXLe9RIfYPLZ8jbTlFmgUt54B9dfwwVGk6tvVQIFiSdyWYmBPUoaPn3qbmVj/BZWOtmhii35D

sfMWixakj9QfhC2ag+6YSu91KmbLmH1Qqhsl17Tg4SRWI6AO3XLP41K/qeVCnUjZ+37HtkfZGgEV5wry

h+Apna4Itnx/ACK1HcSXkuLsuZbJAWnxfx6uhkbusj
GgLN7DaKdT7S0gC1SQPgGT3cNAjHJLacbDJY/SGacTIWejWT8Y+gEgzE57zmFJvre3LMHBYJme3Aalzg

/jUMEnt1Sz4Dophu5JGl9nkHJVFqqKXTe0y3EX6g0q6Se+3sdVNTu/GbFaSrTuxfHKQ9eD2vi8RKryKg

oEVeZImbIkNOurlfAaL6gP8z9GMZ5Sqo7GeNyhI0g6
wAnCxiWoimq3ucGrpqgSpfWHwcywO4drevOk9tDjB+fSR0ooztP2MV6SOQcftEJ0/4+FwX5sRQRw5OWD

ZydnPreBNutV300vGO+Rn/EG64jchQVm38wi/loCBUWWTqJjVSVU7JffTcNbClUeiNuLSdAzSw2+rl5U

/3JDmbqHbobbXKz4b7SGzKJYtJ8EA5WpKXuVROrOUL
DuAOPKjx80PvcWLyLczODPrQpqPqHGRhv1Tid+G2NMeH447VS/CBeNguVgER1P7IWQXKywMvt7mgc37s

RrLoDLwDEpDB97ntSsnZCNXHf/og4XzHn1DwDZ7QVmY7CAbDkqJt6hFX9wSypKgBUtJo7ID+M2t/Geo+

xB/4HreMMi0PnzFWZzxYMJp2HMkeARtloH1f0R3EM+
o/uFDOq6UkpmskVbAJ4SupvONCTL5YURqOEMru3J254B9hGMM9d80Wsxo5Kbq9a6ozE08fmMMMOFpsTj

eKfs+8M1U5uRWGuNdLMQ4DC9TW1oUHut5E+XpZO7lLAG8kAG2qDe3ACa6MeTvKoPt6wPjYdexEe7Xmrv

B6e+iVdXnMeyp4X3N7cut02dzv1NM/GXRliE+kH582
Vgrv2DpWGwXQo+I2SwJY1wVY1CFadLOXJCOYdnyZ2n/5zPWsCGIFsq1S7W6o9xKw8sEEenEr1o1Rrhxd

s7eESnYQmiN5KgPHANXILRgvsuMlKe3eMoDGH72Fvsl1FBCqTQ47bhpa0i0An5DeVwUydhbYv4R/+LxW

a11lXRdTXmHmDDcwueVFujZupbPQtv2z/tzNU2TI/Y
pVfgg0pvy6fNx0ieuHCot2I85e6rRMpkS/VU1MQb2aVefh6qOWpboYrzmBwzHTOdn5n2UOapKjghpRSo

GjwMCm9h178x9L9EqPK/6uRvqavzQ/NZwghX0kEKvOP8m9HGkJWD4PObl+Vj6etgiJTcRBNo7JN7oZAT

kqf+SAVgO4lyDOC+qSTnNV5RQus4FNP8wrf1WgicFW
9Bz0kmXo3H8AyobwzpM1ppIqwpnJ+nukiPZ4dYT1+h7rRBS48iGQ3aOltDGsBDVEWoItzIpKY3hqrPkB

zezqx5IU/Ga3fCgx5m4pgA/TQ5fxJaxs90HOlONNHCBPRzMst6v5IG0t7J3bb1Ym8BLCxE7iCB67EnCt

IZS9A1pjvMvfmoHJ8njOx6xMoIVyAEeKQjaqdRMpIL
olInmN57BYs6cTxQ8L/e6ebYj97PW9mxZYR62Pp4xRFiVUEYxpz7mvIWhh2B8Oc62ZebqwcqSYgM5l6I

HxMHV0oXwxr0K10Z3IAZ5axnx4iLVomQAyjZACcQDedllSIv1aINfMtYy8WUbDzdzj6aBZIApIaTfkjD

0jTELq0cJDP3mZXC0G+jKf+mXeTqtyOtpwkGEE/ctN
bF4DJ1hQ9YWYj9t8OKOy0sG/36V48XkkTQywvh5/5cFgJQXme1g37Z+GluMxipYRmlvDp2sgA2c1wMF0

l9awPNl9awMw2yIp3X+Irdb0Tl9b2o8toTpzsVpdF7nM9ZqbpXEOJ/iUpTGHawgoQi2P8vzwz+bnOT9H

0os2BCywNnDVd6pW/9962+nh7tFbZqu7gTkdvG8Guj
530K45XRRIcvAyrC0SUl/mVXgFDmj0A6ABurbUgguQHBZ5C0JlRmoMKGrfn356fw3CP2kcNeMF+icRQd

tsZOsRFGSsT9FwEAV6a2l/FVd9eKi8ZiMWRLoi2yuM//Rsae0uxDEUlbOYvYLV+b+T1YIrsi+d37WaTH

X9ojFkpPunEOsctqKfY9OJrvBnR4klY0rxNcnanmg0
jIF1Wtym06t2fkTXtPBm+lGFaUjv9sY3Nxqc6TMyUJJ/0xgdodA9FSUeaZDco5O6pl98px+K6eNJKr48

Bor4iTr60w7bREU6r1XLAwF+S0loemQNjmbZuNRYkM3qH6grQR1I24HA4EN+BVoiNrTklx3+z/yEdD1N

9vw6B2ur1SVSoiBlmI6V/u6yD9OMR6rHbRRr5lO5Ts
pdWUTAi7sYGLcgbRJzUC/iwK9z1MJFv43W05fT/Wyp1fB8/pX+hy8a1ZPn57g4uQVU5xQoZunXGAiIlP

MXpUSCiS0kzKX5jQgkbQ5dCSiSOvU917AAOilryI60ll8yh2w6L6O/4QOO0S4I8oGYMD1ccuhRTh0jeC

7+ExLrM5cwWF2ZxnIoV32lP9r4h/+Cv8HfBqarNjwE
jv2CA50FrxVswb+JwvNUhYBMoO6dE33D/zndp4m4iTMxl8TSUttBO29mk256+cH9L1E9NXfA7lyfJpJV

deR37f2+WylazhmWIZOsmgC7l+ln8ZyxGi3bo5GQI1d9dsul1lbr14gPzD5oCqEO9cU8dn5cCXhmlVO1

g39XWXNWjlTkKhUQtWiUhb65o4HW+PGN/m8F/LJHfy
bW4nKSBKyA3bR1VngoV5M8YUYNDQ6z1xZ+Hc29YCsQvZ0xIATmWFFP1IsgrOdyq9O+L75tVHKw+0aN77

FECfqmnrMo8lkeTx+k12Ic83s9gXA0p6DDO5vmMPDELjkINazEeI8q4Hd9fA5/LYNrQlxRwwDL//sSTP

48DVu6pqO1tb+N6pUNMn9IDuVdlB+hX3psLjcH0bpq
swkQpkJXq9d/g7f91C7hCLssmbZbyiMYU9bWmXW8xKSFNkir0ISoeZ85dpG41wJAWI+n5tsLJws+7pFN

uUd/Tp9IcbpGB3/m6v0kj9ZPkx835D8DkoVBqrhdPk6Elr29qqCJkI9BRd7TIP5hu4plABWlYwsoFHvj

flPnhuwBOM5yCQTdYwtu3bDa/mDMGCdZ7Eso3+q6gJ
WcWGm790Gg0A111iDYa6BWGfRR5hNBSycaES5Ps4JmfdvYU5IocgbOQYEjy/pq5Vix+jrsoHbuySqPJe

2ZiUeLGEVhKtw+wHH9re/5soggpT8hwPNtTuKTtcD93ZNuvcWSuzcq0pv/EFGhyCpZYlbwcf6b5XR5Qd

bbyZpxOnwDAyMyF77MwLLKfD9qjXn34knlPKQF6TX/
tTJKDoiXFQxVUiNE/q84V6VWYEq3Thqt2JYhnqkqL7lcDmBfPeJS7Y6syL9ymm7HzB7O6YHPhASu8faO

xGKekTtiT6Uefn93Da0Vk6rT8rm3Cs+3dUwJbtTOrDaGfuBs+F59uEDsHoEyW5Z917J2yxSfAriOjYLB

YVPh7JEZVzI1ciFsc5GmcVeUzjRHkq45OkESUbmVSk
MKkn3eie7Fi/UvXklmHHzKOcXe0InuldTSLwpxXAAKnce4pRF3Vwwz59bujKD6GuPxJa9xeAkekGnhTD

+pguJ19ePCNwGJNtxiPCjjjKpbQtgIAyROs7wlb+Wi36h2ViRwcp1GTW5wop1bSY9G6jSEieqGWgaxK5

GdsdrurxKFpTak1caNZ/lU7oqzC3h5hx0Qoe/sNhSl
w+illmmL8x9ty98jIBRNvSkndulIDMmM2cGLjwmd9Ke1a5jen7ZXRs4FMnl05DVJfJfx+44Uvwa0TNsI

gc6wA2jB5pKrHfqiFmsJxGIgbPiIjSN2hWf30g6Wc0kyvtx99RtLu1yG7mZbqDLmnHLTkJAlkvlQYBmr

Ewt3OA7r178BeaHuJ5ZFsevgcPO/hupM+tu/A9vcv3
peT0uP8XS/yIVo+RtRz/wH9RsxyWkWEmLU7MJyeakh+DUNCrY35NbLoKapxC7Kk5SuVtoc4H3ORJdNCK

i4zy7oGzIbrEcwCEiZNWieDha2Lo7xR2PZ2LYH0pfhB6y2ThEhWf1fA4u1z8fAravFdH97jvvNiVNfL3

29a/VnwadUuzfWPipSrOniW/jlZWeocK1ix/yTuLu2
fpjm8+0RRnj8bauxUdmHPrewDkelZ8i5+TaPcD57Vp/G/DEeOBUtrTRNTZXywN391WfKVAOW1LpD7aQC

+mo2CZf42TTmE87Q5m20u1ztJOHFitfZtFA0XIKgy/ge2EWEfNpgfKO86ta2rmMnqOslTdgUfeYxhB9h

UUFJGpK1WGHglszOhpi6RWMjRPvxM+wSTELkRECHUa
ekr2I5Roeu4WZS3YwHrS07oVitsfeHQpOrBW9OSB7Pvy/Xn1kXD5XPRmvya+PU10DkpU9mnYqrvoKRpm

pol0ltxOPdDLc9KDw0gIqy/V4Edo4ev42PVJ6Ozrg/P3srXWSowLHgYhLYiXDr/FpFpcnlrqDi4A5h0p

hu+NZ+BvsW/xPToIYdmfZSrK7IbdI+mu1SVqriz22C
Mckenzie/Q4JwCHjjj+pV57qoginv5g9iCZue29aSc21So34pOh/bkx9YoQsgIKhuz3hQPFIqFRzwR+eW31/2p

lsJWejsYqF9YG1epZkbPmXw08kvTY6sUBfViGWvwoXVTinkagbgJ6qjAC0sSrUYjnMnfThOIzv4yXXOb

CYkDuZe4bDWTnPejW2TBPKSoQAYBu3TXusanYMhXLN
qCHFPOM1G/LzT35bQ0715wcCPQI/fvfUf4RmlJYbqlsZye3RkCoRJPGR+GTEPqjHQBEeIJDI2PbsZJzJ

bt0u/5SWVvHL+yc9kbwbyO88dlyKaycVk/aTJJtrB+h+ZCqMvRxQ/jaMqnck5+QvXAHCJXudf4JFPC7T

g/kHKmVa+Iys19uSd03R7fT/3UOxAHuJGW8gCq6AUq
JdjD6hOLAuHbwn58ATgk1jDvj6mAE/2kTwtGF4Lulh6wJ1bvr5Y/wPJWHgLZaeDfP7aJxORUyWo+72ii

MGF/naFsG2vZPL/FLAfOP7pUykytxeiYbSmcLu2e3P3bhbHWmmx/wlgfCu8OuAY48/2MScDVE5km1uO1

od1T4jCmIwkOc3ZD6gIQNCQ9N3PqjiDqg8VxaWYFKB
sBP/vD0zQz5EAGPCbeicTPZRUCxrEe2HB7s2bSrv4140dywhj6CaSWG8hYrwtxQKI5PT3mlVK2aBztQ0

lCtqOxl4QhoOPnOd5NuZgkQBHVl1CvaspX3628fKtYnZs+B8tcIoR+unN46bFpHoZh/xbZUzfS4JV7W7

xG17zaQ43gKAV7o1AmB/zOGixh6elSAGG6IK3PR7fA
cqfuX3Gc+MXHlRuKozaQ9NuzcMVUSN/EA2Z79+rhpuFxuRqPyfDg7hUD+tGZH0PbjoKulmUNeU0c6Dmv

lrAEW0PbBSkzIARk+iqfHCKFghNTdw3X0p2JZ2yXpre8ucGRnTe6wH4xaMj1XcsJO7cfaSwSvDA76zGi

Ml7FYzrcvf6iy8FXaINXcK7QSjvRxZD7PuifjjL/C3
bh47Wndm0UeqMxqAItQ/yO8jIwy3Ui+mCcrzlwWy0wrmPlZsLcCEHcq7QCPG4TVBEGAe/a+whqt8DCpw

5rDh+2pHyPzTpbRklnf+YYaBncHhiEYgDqd7rzUbQTs/0T6E9Lumomj6PiXQTJlvEMyXGSeZAfAR1ATE

Ce/GmDx9t63o9L8kRgW0ttCs1Y8/AxAr4AVJxSs0dQ
6vIMFKDL3PEySocrSGAFxYT0lbU1FJ9JEL99ccLjSEzXyPqU3myhPKXAHiY5gSUHt0ZRZWbBYfbXS+nj

Eb5Fra96ZAc5EFptCV30fSMCJsQu/nk1LxWC72iW5yDSl9nEVjRCU+bM0i4ASqrrouSH+pGWljlIm1Zp

cwcw7VBa/mWSCSBr/vu1CIczOPDHYGhKfeugzf8lJv
gUe2rBfctdgxfGAcz1ib6lb4v8CG0St/EDJ+oiFv0zlqbHVhePvMf0LwcI86OXYT5R5kaKrmPCN/AH/R

YIY4oQgBd9FBTs2QTcdNFxc53oLjjpjdM1f3j6ZwUVkSEf8LknwJkC4iQtuSIh1BhdnXblz39IY5LkuT

uwB6z0rpiQej2hbT0qzF+BglXFiZU1h6MpHxU83/ST
JytZHUHamE676AAsDD2tPks0iCi01x+wkNf/OJfBv2prxhgAmnKXQvioqtGh3e/3Ytlv/aeM31fc3rzT

9m7H5YZdVrunDZf2q7J2rUhXeBLvYXCWrwppRvJ8olM5zIKveAL7F/RDAVXjBGPi9lbiVYnWjr2PfXq/

FgMqsRDuNjGIx7EgROTnmHdPb9Y9id2IGRviwWxVdD
7FwsJJ021GNxh+TS11MXhbkwrKZCxA25K+LYRNctbM5hbycjkZB1QvRHksdmc95bFI/deC2meT3wXRhC

5hfKc0TEPQkcAmKiJ6N+oWDC6yUXTMvRkuIC3oXG1QdFjsI/e0iApxmskQQpNxl1FfP8BLl5QQUp/AAe

Lp7rAbVSBRbYL7F+qp29BB420+hFnr6wk2ZdEb/CmQ
v4e2GU4hLTxUikf57UguR+if4rnvH65Lz/WEeLn5+tKqi3Q1A/dIQa7klvG8QVso38h2CFzxns69IGsD

yRKOFNMc/ReGauC0AVXcK700YA+yYl2k85U8VXnBlG59/1NsJLOevzt0x+FwbFlX3hx1NHnWVa+1UNl8

K4TrNstRe3f/PlbnXRBbohSBD4OAa/42g2rLSVUm40
/M4aLcyYHJw6HtoSikeEaiIdCAaRr0Cc6QxCLq/2xAIhM9BqxfLPUQQ1o9KMKyL057/WS+vmAFtvd47E

iUfGv/crnOKrkasfGxhcd8sX1IlzATl9laKvVIj6+Yhy6SAW1yYNS6WdOefLEp7MhgmO28qoXLuC/X7J

QAMffkMlYR8tvJ6lQ+R9GB8r5NaaQbB1bkQULGbpbx
0wuCwT0S13iuCHUXyi4Ors5nwIs527PEtU7+0c2c/Nx5OQPwb9ERFXTNogUqWfx4aSvTkdyyUW3Uwf+o

C48hRH3ZYAjMpSHgzDMjH+yWSwhmRZcZnpGlBKnS8dE+lzXF0ofQALVZ+R0gtd5RZxu9jKNbDwhRTTI0

NlyjP17cP2q+tyt/DZ4jN+o6h1Rg1IQfxNIvGCt43z
63jzuXmTUrs+f8SQhC0JHT5EfI+a5HJmbRN7ie4i9lEhgYmG5yJVovtwUsATfmbYN4+OomE+YtNP5ihy

CQreZtw2JJ/vzYcTHDRfqowmcPe5N/hsCi0XAIn0BcELy0lz81/jTS9TNZBfb97zhyOVKH44E8gwQwp1

PVgK0DwbcyQe03zBnVIKkgbggMd/rg7k8b/Y0QnS9a
FwrZt7IxL5pPVR8QDDx6teqTIw+wzQr0WZCamg6Ii+d7NGEfO2eOWRZEuAUYv5/twIOhJ2S4yUvSp3qx

TRUScFeuOGACmMDLCnGX7drlfCmj70ZBx880osTqKaUFY8Y/MAOLzbjs43y3oCJUGqPbSV7WP74MP1QX

v/yXykYcfVUuHoiNumUadYBIfBSq0/aYGm0h87xloC
BQ2Q6b+YpBrrFPokzgnpGe7nhJid4+KobAxOjisYqYEhkjOBKJgJc9rkKxvKIBk9kJRj4VEz40JCz1bb

73W/vOLNZYuKbhLKN8caY38hy6PD6YVo+fbaxLvTcm+724RgzUUnJ7hNQicJr6mPv+qS//bxgj93Kbwx

B8tA02eu6+AHAXb/LUdigdkXLETELuSMWsD7oBgoca
zMoYkcY2xdBSAd97EZoS9efEx55GtCUPz+hpGwGL7ZeosGz+US3/v7Kc84jrvFermKIc5g+L2p3+8jFK

L5xaGWrx8eb/vKEvSoUPRMQi4L++03/en6MpwMYnEQNyUzRr45izrk0XuU1i9tqMUi5+7ykZq7XWGclr

ROEVglGQXOM8aKHY1HPgX5o2M8tKtiaIf4Ai8C43y2
cyuL49f+5+E0ccNfsHByMnVXiDBivyBSYBJ2jwCxTKmRQf5YSYdLT2Q4doaLl9PQJ4pWMnTCn4w3685r

5r1KtwaRM4gPkfusSxC6XcXZfTP6ZQpuNsEHbjudV45TtK19Qr12ZwEblx9/CJ0XyNmgz7idfFov9Ucy

ZiPFNYI/S4HL8SyJAlBoNjiEDxbisR+igSiCP0F55S
WjjFpy/RfDh0cQFPO9EUvhCacfN44kyqONXP2ivAiz8moigHCJwTsiNrgQ1SJznvgMTc2ECzZYFfBh+z

y6rFL5k27WTlyJPWslTUwwPehZyFOs+3s2QocKuHf0o25i84+vSJ5f1Dwb+kTSGwQRAZvYovW0at4x0s

OqX4Ashc0gxfxY/vny/1fi9/L8tOnayjvdvkE33MtI
ykvAs349XR6FgAvWJ5Btgm7otsHx8tDGfmM3YT6+qCw5qs2T+eGbFA+vh/QHf4SaoVAVCkMxFcyLHcwF

elr1NmI1PdZPomAhOSWtKwzSUtti9cU+rfpj00qjL0d6uS3PPcYQGLustcvlibKiY8U/SpcsRScJxVPR

zHQZAUzHu+dU+7XpGN5Ec92gHGPAM3PtYf0xVA5CtJ
Zui8905XSSVGtzBNSIgBEO/lRxbXllO1/MgOzK+iMiS4U2ehWjWDZV9y+w1a/kcw4eUeZiPVJuwhytmg

a8suWCcNvdxsyitRhNWduCp6jkjgaBtXxCQQ4lzJ4O1jGV69kLudvggkR1jtXYU5/WtknnL2Txu+kJ+n

D+R5W9twm+n+Y8K7Qb/ygsMm2UgCTkeqKug1w3tqdL
/AaGlhfDk5FmAchKDPVeWIYKNCvd+ueyLP1zJTjHeXEz5riPZwE7i6Lp6KyYid1O3CGvgM95ZqDrb/bp

zCzM7KaKwaqnhRRAlPC1C8IYAWscfT8weWGiy12ytOpH4o42jO6d2xYP15f5ukPM11pita0OdmMIttLI

uMJYnzAiNxbYcX7U2wK6e/xl/5VFyNSO56MQ4YTgy6
CAyySrSq6ZuqBYbTcbhLegXNQkGf/7CtxANGa3+LiQYl64V+h+MFZcH08D16dsmSmJ67rAq948B87i0/

V3bdHj37TFmIg8D31ur8UtBmlFKjDNoJZT8BimMJgs5s2YX2jAbryfZHknhY54wGJpc3jVTJaEOmwmOU

dfoyWYd9Mb1wSwIL8xnnfG8fwmekaT0KijC6xkXtp/
b7+keMZcid5KSj4OhJ6Ay5Rh/0mz5F/PoEP19LkYSBX6FNTyguJpUQbluvIDUgrUGBin2fwvZ0UoMnkt

xHJHU0eNJxBPuxthuB+0O8luYcukA/Zh43ldwF7vb0mVuPOPhDNfEW1tFcXL9gcGQTRUpN/068dkPktI

elId7vtYQWGp1JJ1t1RX1yvYaPbvc8s+pf78Kld0Mp
g8+Q2Xa8HephN+OD/YhJTAzXyXrL5ox8KO3c5i7qwkNa4XJq+t2ZGxqxY0S3gIjeyLBBCdBrbKVz+pry

Ll8JzDZkXdV0Bf1wcX+aiR2e752PyTl4jOzX02nvjtJxM5ravhEUUqhPjVD840S5WrvUreTM/dY/G/0E

Jx2SqBnu6ab4PH1ty6VzLVjnJTIUgIeVUHFYthKYMg
JjM1+AVoAcdLcdpSrs/tu59I/1Fyl3lp0bZzdaZ3M+8R5tF88akD2/gvgoXMnsJtqYZBkeIxY5UrZYIf

GatMNoEq9kOLKlDWx8ht08Hr8FowlvnZHmXbGgViW0m4SyOFlYLQRV0/n6HxyeP3ku061qUKl9oe/46f

6AEVH6PZ4vxHk8Ji7LDGb7z+7N7AKJT40aLQ+arx5N
SqjdoGb1pJoG/ofUEJJkq5XGhUpeI9c7e5qZJEnJUbvYr8aMFyMyq7pjVZO452DIUxu9E/Cvkz9Tzk69

poOXvHdGZPlh+kAGptMkWpgfvBeOWi6PD5w6lJDrcEeP1LwOkFfaiBkCGQFr8qrFN3z2e6OFCENURYl8

6CCw7zt+ZeDmsNulIKiAm8D7Is96mjCX9pP57YolDM
cZVgKyUNuBBjEpgtk7K6ZX24lglHmSvFpkxNE4J9xjGng0NM6w20gW8zOiO4xUuLCpknyhvVbftS/OfX

QxExM8HAlIxwstj//OssMtRJVtcxiuoFxtY3FvrIxuLBf8sztSIaFuBLTCSNGrokX7rmh0QrHXX1bb5G

5jSZQM7iilcPRDUgTmRcmZ/IaaivcdQ5mCxZoqxRiG
tj8zqy1LgwVTPPOstf9UrykHDqj5+BLx5OF2XmZT5sgqZV1zwwZxn7tAAbG03lLljVbz389ayHgKlpaX

0QIM91kS8XJ2YDoLXA1dAc3eIM6waHi+JYIaXyliDjRa9IEI+R++cyd4DCpGJ8ogbk6JU3F0/MQehhNk

zOtbMF5paSo+50oReQTeB3/V0PUxXabQRIeF4gxQuj
/XLqmFKLneV/hCjlTkgir8/fer/LXgrtfAv0N2NFqwLGG1XzENdcbs1T9r8RJOAMcGWYXNGQMetS+Cjl

QSi2I78S4lCZr2mE+xW728169b/avYb+26BAMcWcwgj0WZSodFPv4yYWjNC+G3FVZ9/nclPT7AmUIiRg

H4/aZ6zLMxuFjfVNk1LuQbS/qd9B/IP5B/IP9A/oH8
A/kH8g/kH8j/ByEZ/0dMeA06/58dFdFLqjMHwJF6582vzc4rB6s3Q6wGbrkXfvbOlQQmBmwFmoLZf6fJ

v4t75VPQvOjMiHHS7nRHk6O4e7X4hbgNrmVO1K1sLYy4DZwEUxtzLyW4oolY8oDXZ1pYmOej7keDD/kX

w9Kn/xVsEQyvL7WYOc5nUTh/4Pmi/fEzT0lZVYqX9p
qjEuDL8++1PFBokSUyXMzR66baA9ZMXbIjgK6OPrBmsgg5ZgzkyW+UFnA8BCkScU4DFPgSPGcZfPMtn/

30ik5cHtIdcGqtmmsDHMdi9xpQDtXgXKNqnDGR3a3hxGBRos1tEeFQdjbVnJ7R3T/6YIWR8dBjwWKItb

VDCqXA/O0fCvy/VbxXUGRI+Z/imlEcCmLJVAU5HBez
jfiBeigFCXh7SHG6fwZh3n6q1vJeJ7PbdgfrOaVc44mFackb0zzdUh1ScQeAhHHY7P77zXXoGF4F0x47

u0140OdLe7OCCdwND86eQl4bWqj22fv+OT28kc7BJ40ZHh9oUoHIPzTnIvITCF6Pne0wH7cwrIqxwdw2

58eITSiGJJzQzcN7434r6yNsV4hycFTi+EhlvsBlHS
86y2NY+MzeX1axY6kyPXuGsMGOpS0SYg01tIWrpClA4XksXRK47aTNGgVlHcJQZew1RLgfIsguz1/7fA

7kVPYDn2QXBHCTxR2WQHVqGilnaOADVkStEEW89reSyLv8/Wx+E9Rz6b8PAELELRaIFL0PikDa9xfWHA

fH6RB1Hmg9tatPu8R5butZfeyU0jZxkkJljRc3iHMb
HjZbD1YMsKP1Zy/YFTsvf1A1PKDTKWrHlXr8RDJ0Le6zf53j3lF+D8UVTDRHvKGkrUQtw+9bMa2Pgej1

Eeij1CZvNNFeyYWxU9XuI85JTYk9ubzzOQMIlJ9aUX5c3xyzHdZ/+Yj373mBb8HzI+B1bfwG6kViAIb1

WYHHFNb12HbaJtZHYtYyoVH5OZdsaxZI2tv7G96+4c
QghV6hGlfN14dEpwAE+rGjU37HdIKPBnva7GjU79lDbDWAGAOrpJJ9ZWVhzQgdjkFIXFa0Fh/zBjerRw

dGGf8fYL1y+JFvBB9Qodkowu2tJLI6LVx5L84wBI1ppaJ/Y+jFKMt1flwKRR0MfqSGbH38WJ00PI0dji

xqvDMzmeUojboiz4kPOhSW2wAghKcaq9wKRNRPfe5L
a3IpCYHJpL7a6TkoFlD7nZBDaagDeG8XH46LCsghDGHBJEDhhmcWINAqmz7yl4bJ5arltVqCDeOmTOsa

91z91hA80cwV8qKQ8fWuXbKyom6Jz58OnT16dfBrflbahE09PnJ3lqnpHxt4eVkWNa/a+BLpokLDYouf

6fj3NuBsMmaNRDQxrI4wmgypo//c/mpMu+tNeg05+p
n8E3ar+WSnRGPnWwuH10361WUpcosE69ddc5VVQZc9Gq9tNA1fdKSl6ja36oZel1bBBQZUaSosQ8Pjni

scb52c/Jx0pIyRcIynHDtv0/9hqtJe4CFPQkqSeemmTo6/auekk0NaD0VOaVAGFX60N74jN/ywi4qm05

7Aa3yo0MqJxwyggtxN9/0cQZ19RigzGVyIV7L39iH+
su1gFGkCYE5wTh2ldFtn5ZFueDcbhUJ4jpyA0rXlq3eEELusPR1DgmrtewtdPqvltBayox38nSi0sTzs

H7/6FFJFeDhgb5fDNy4IKrmKlnYOZ7dwcgD50nRTixs8X7ETcnuHDiYvjPDpf/cydrJ9hVHDq//B3ltG

zac6peGsMJsV7NAVtqZdDRCgUBnG1+LG7X3FCpUylT
PWz6neHL4Bo6nJsCPiNIyHr+69h934m8cXOb+jR6OOgmT/vS3HyrmlnM/s3LKf+35lemcs75uQ1n8oM1

NpXsw1gxN1VL7VmKaAq32pGpkLGPmQiTZUNCPmNNvA0lRZN/Upxczpt34MvWqjSgr9lDAj1BWDChe7U8

A5Zow5gqCoIYRVo8M0F6IAOKrQZ4eZVo3EZSjq8B9h
UMveEX4l+IyiVsQ2FLvmGJICfpxIFBc4aUn2kePwouLxT/76ULJxCacEXEdwINndiLzg1oqI5LqwQVhz

WzpND2JH/6UAJwkw4bFJrp4o8e5PE+F8OAiI+wB5dKp37V8xoh38QsNKdtwCvWiOkviE49wPtozvNi4Z

VWhChOOI03pf45sDAT5ve7qmtSOQNG8EwhrXhKQmmf
Y4OEjQhLeRBQ1kwgDN/hmChQ0Ud03vURhpSd7eZSR7TEBqo+Zo0Uni5yGQe8RhbQvYjlJGkVOO7MCMLk

JjudjNJhG2+xqH+S8P8y/48nZu88bKHwHc62/6XthIzaQryLm232qbtUZzxQwjVwogEQdDTWc5KpxL68

wlg4jHvX8aB41lB81vVMpTXPawEiX7r/EUVYaVJBR7
mReeKzCmviLx6LATkhV7BQd497fg+XWGaUCkwL3GsRzZILc3j7zkgQDdyTLnWFectBsgAjM6T6U2UCun

dRQ4BZjP1JNgBzelql7Uko2arT6JWjlMWlchffWeCiRTpV1BLOMQ12gAaXxC3WTFu/JeQULz1fyyfpjf

jrCKnJNftiH7a5aAeAu17F5BtZbga4leNYlpXsMRol
qRHW68KFe1hN+Q6oc4eq85TABzZKJ9TCoKBa+H2pA+nAanbKkPAfKMxuVFYHuUnmK1mYpPK27x3P8C9u

1u3J/fskh+3/ujOk0DCk5zgoxnwY+3jtYQ+6kXMMreUVo+NiDdXniP5UFk8G0Nw3wFjTPZSbV8YFmaEq

fgUW0sGgENSKo6kjCwywa1JclfqqxCPjcRjfzubYb3
9pOatpdYhBDl0Cj6wZb4m/FNprQOlh3uUeo7R9uDCZihC/kL9YOAW4HAzZQdjc1qVBTX44aYhg4UT/D9

vCA4z+K0/70xtbpx3ubdzu1J8bKjv3jsEx67oj2mPZtQ9OUl5hkIsxNOcqTLQ+O5TewW6oUVq9pYazuD

twTYcmG/JccIYypDdsxz6Z4Ig1229RurZzRuaR0m/p
Dl60CNum/Wp7HNzow8rvHmiEon3IH57PiDLstymRJiSpOu1eujVUB6Es0K3i+6XXBR5s/cZbO7GbAvZu

q1fbJGxOBQHT/h5d+iH2o/UCNnPsAtPB5y9zLWjNRgB9kRlQzQHZKvAlVs4/90DGBgLc8LMjRMnH7IbJ

gT4K6dW+yuoxYodxRAYMW1xBP3g8jiizEn3xfJNlrK
8Kd2vD7Uorw6xwsu7oUripeWdvp4ScRDX3plL5Vk4DUAuVHVmEj/4J8N8HWHvhJrHfANpGOwFPoK4i2p

bu682x2GrTjgb1ZkWMmZwoWP8Hd6zyptaNlczMcO9X8eGwahdatCSNQSOFMjx6Q2kr3lA6dz8K+AGvvC

BhS3z348tJyGSYtPUO8ZlYh2GqAi4yJzx9YFwF2s1L
IIy9MNKzWwAwUMonCDdGAyKwVydariDtzQrk9hDGLWQqZ6QtJwiavoENDYX82rkTrQZPcc1LdYAPfBWa

dbS0kwtV9zu+Kc006LZoLeBGqFCTPKDlwHtZprEWkXbsUN7C1jfic7yvf8xzvLN/huan+X5hAznRePe3y

AJXTUkUh6UrnQ8q87Wvc6tICO+gqbv2oNC1OXza6zI
uLc97MUTEza4yu3mo5PchAHf/xFPgd43EKgEBOHXNnMT9v/RVuKbaJWY5+H9YdjJacl10LOHeiO21QA2

XUHQcnBezIO13pUu5n3hbztOCxWyWPikVZmnLn0lQpVvNqT2M67LbofDXfTWnfEUayXvbOyiKKiRJe2H

XV5UxRK6lzjMcghC/Gqi868fa6zZTWWPcBxZZoppt8
Q2xaGBtG9SAzOwAQD/NG4+KuwtCX3jeYwTtR56TPCDNoQLEaMxpeB+OCiLIroex0eNZHLqlgu1VjcEpi

LS/CNtSQ7lTi2zokeLGxdlkscwgjlFxvVcqnxZq/Hoiq0ClxSBVX5V0C7jLBI2C/iKhB+7aHMv5NQg7h

///CHX6ZZ0HgZgrMwDuYBoJGlU5NGzJaUM1qHn1BA/
Xs5hpuuG7YR2zPWcBmp/qHJbPtyUIo22rLp6V58ByeKM8WMqbmMaQQ152BnbpMlLRNuAX5eKtDouc8Lx

KZgYBUEacId8iT7k7kAcPTSAZg6oFYh5vW17QFU9LzpRqY6gCOsu29HtJNIYAnJlFdumJRegEQ2slgNH

NyLy8JWZ7iTjdQMgpZsahdEbuAtaZNwA0l38kRnkEj
7/sMj/n8vz/o8KP35DYMTSBDT5kcs4WgRbnRXlCqrvOQphrMlIFA3ZFT78V5M+wgnuQrhptxiZSdMaR2

76zT+Oxxw2p9eiPUCj9JKjRs0hUx9fGyWVYsu/qcOb6d8IPNLIal1QP+i9DbwxoTDoW6IL3dwMBS5AXT

GO+ON9gdCUZYggov4svGOj9+m83CdVk1EbxYpjk1WP
vPg+NDJde2YjvbnipapNOOLYaN6+nA5WVYNe26odgBl2pXug1DnXrJ+8JDYaOyHTJHL26k/yQgWPo2Zd

oB7nwylVyXiwggEJQxSSpw1iezYVVn8Bp+Hh5PF848gaA2CU1aUhwh8CTzAPa+D+DkrNaygXujP1H0cQ

n8EgyN5r0uJkVExqhGquQVQp5pLbHnoGM4nbqkV+uJ
MvkcQtjkETyRMmQLvdJ8PyiI+KMYSdKDj9KdcLe0RAlCRpYL4c9GY0jLnf6Ka8oAasS9NZrkzz7RysII

O6UCLVSFeLKKvp6GiLlnpIXkdtNXEOW8tfcjhMHkRkJ+lHmmgU5NnnSCFY4yvJy23JGmW1wji/5Kaq+1

Nyo5kPFbVmNOKe2Reu4YfiuU/ai9dl0U2LuCYRvCpV
H5NdLCDEEhIhLeFqs0G4wDMiTPGrY+7K8KA9DaxG3q9oGu89uqNW5/Ge1aNPXog3KX88UOjOs5e63+t5

LTTUU9LYa3+6SR+takOgMV2ZPl2JUiuNyLzE+0cAxQekUOIYXpFzpg/K5ebqLwDvWnw03h/Ym4sjwXuG

1rDuI1TBXoAd6/NJeTnH3CNjZ6Qoaici2uInLUhoZf
AXVhp433XTpB1lCGwODm4kMr6HfBU0Yu92DcqxQ5hDlYV2U1ZHXWqR2Y/mAtvdJumfDzP9OvlNtbbXUE

vIUiUMDsrezdfd78KRN6tna9Sz6kD3e1DzbNGs/SW49HzU67kmyrWo9dSApiDgzMi131e5TF0wWjnmV6

hjD3EeaD3EVu7zi0yeczYYjmKsgfk4740e3aHTT700
DVFK2U9Sa0ZfmtXwQHX06WD2KpkmGxDtQkTn2IpTbzDydDSHCuFcaSUZpjPZjfAF/1BiRj29rSf/gyCy

Q9uZfAGHNLmhkyzlrpUT2fNIKDuDrbvJWZFIXWgMOBKz4hTQ3Ntr27G8fUDkZN5Jh7DcueZvQG9MicHv

JB2gP2YUVywzApEYVp+9E+l3LDXxQ20URo2V9lCiuF
d5XwvK07n9RUHgnCfAnJZjhZPWjW0O66EgFh23u6Nm11uA7I0RSNwW2mvMlqxT0d0sKbbmmp4kwnXq1N

QfZRQv9YwZF0M/z8EUwBBYXXY1t1Ci+nwzW+a7whgNZ08ajTixTwyqd7vZnxct6lj0vZ0o9BFbE6WUwS

NrHnr/JyZwZ0a8fLvBsw3f9zT4jx+R74JvuQHQkwV/
5uluNEucI5Aikdpn1YpBkEwFuXUHeI7lt69f0U1fJpBdId8pL5Bop+JjtGza+ZGG+e5mY4yeIAoJ0iAR

Hvk/HgM8uspwTWwT1JtbEjoIAcDbcy6C/9wO83+lFP+D4YPKUdCEHAFCU7bOGmZHdr7/nqiQOAOkeicN

3gN0ncB7OpSLj5WFX3M2dLY/1M415QMn0af2Dz/Imn
/0pxnnBSszWps+N1zYCh0ryTH84po++1NvzThzkOKT2jY/Tbf12rFbQjMgNV86sDa5K6+dyVzA9I+psN

OdKyaORgrtFqu8mSlbq7Q7KXMYxBopSrlS01vfPBpArH/qxzgyT8rtfII1z8kglX6S18PnKT4xUNx1qf

/9dv+zDrNdTl4+KwwFaS40LQ7MY/L+iJGKUKz5xMsQ
gu1ArNBM+fXeZeKY7ofWycPuXaHYNNDuO8Rb7zrfhIAVF2tzWu5gye3ABLmoTWu3kR52VGvwJs+/mFgo

jwHTz5MWROIs7GxrWzJUnMRK+J9QjEf4GrTX8z6mYjsKuFh873Gc2VfgcopyE4OS7R4sIgpWXffF9hPI

vE7CD73LxhMcv438Jk2GGkQmwERqvSXp+lJ69I6sP0
Et8Tw90jU/BzuPsAuBzpbRsYv9NfPwYBlCNxPm277Ixncyb7ij6bUtss/kyawbA1MXt+ngKp12p24N8l

lZ1cBri+Olfh9eIe4Mr6B5rbsf9X4KJq2Mw153/geel7b3zgxlt5TGYKD7rrcrRWStaLJtN1FANz6Mem

WeZNvX3fy8s6MPCymLcOmeALKCvFOe33Bng0qh0DZc
wkyEy7sPED/5d0urcpAjaEztTCuymaPOGQRSsZk1oRh/536Z2kGxf2HtC/PH4BKwe+gjmuwPjL1/mKBe

7kEBbEhFF7YX33Waz9A1AamFWweD9O758OJQftXUOBPw4+0LA9/MUQvEKXbmC2Lsj8Wj856UjYR947IW

P6bIiXKRXbID5zT32UHJlNFrHyuWrEX4C2e0EvcJr8
amPqPcCvhR4jaWGuVRE2kMX8hQZKq3MPts65nHbp+YxMT+oPA3JBulZoz2KEIhDsQBLpjXcdeTyX4PFd

NhAEmwBWmF2i2sHtPTkBZD3W0EKSrQl8w4LVM2xRTm9mJ4nLiM6UkB3yo6pINgOYmaLuNC9uQREhhwcT

Tsooyioa6fAkAH5vLL3Y2ZtbTRKxjyXb6Hm/sI6M/H
P+hvQuETyqDYkmZl0t/oWxz9T99v7d+tv/3GMd6au9bxhFazA2T6ybjwc1C6zFUGq1XtqCduTFRydv2y

QM+EdvPZRV+In7E8BXpDY2Iy3Io90tLt5pvvGCYBy1NzI9nUNswUDEIlxO8aBTZYjK76Q+bU+QKSu9xb

0v4PlodzDDn9lHN8bXT9RH1n0mEbi44ebwA8gpYA8y
Nc23TrRu/BNDzt5AkuGJrB/QwKz3kVKcByNaiH0rfjx+RcUAMrtS8EFswrHNi0exTiBSuQo5GdWYhvAK

lqI7dxuz00KzLirPl8czyal22iWxWIpp+m8qUEHGctEIkbwIctUgeDyL645xna03lDWuCsSwq5lj5qVN

wlz360WnhW6YzQ/NY2HAuRdARKAnScNoS3Ll+ZaMS4
0PuaTfmQoIQsk/qt9fmMhDUm+vsdOoFRSZctFaeAMYKHRQgJM5fE4Uwece1DCV2vQgVg6pcHfj0njE2f

wMhAIa+Jv34slfXfEqI8AVKf5U8qLHj+jI01AWhJg8+jMUIK/AiuJzKu7rEQGKJRZo9H6EaUlnRLGIDZ

gymo4P6MDsG8bunyhzXgC7HJGbcEP8QRpfYCTqVO5N
ar/zX0Rsx5gwskpmaSm6DzDPZlEpSjeOCoOiYErwXRvJlial9K/x7kXB9BZxR0V82nF61QuLjGlbXeQf

j2X4rIhjvAv5vjI13h6rHaHJhlZ8dXP193O9geVhEBuSo+y1nwlIm+cJz+VxfW/6TScqy2V2hejqP6+f

UeJBUrlH6vlXpCsZRubPTCY8Y2uzY2Fgwo2uY/lodu
ecGTOPZ/0U0ZrT7rQak/9xbQRxuvSx2b00cOOQKqRvSUikmM80CyYkhtpNkBUUqdCUkoKfYxnFG0tacM

BUxHD6hVV/JXNb4qQDEU2/cICHtanRzhujyIaFQU/hJx7hC3PaoL2e0bU5h6g0oKIagVkXXnOuJ26+1I

3uxciXVGl0szZIMfYDo94E/qeyNLq8Ic3vUccsFSem
eRr+6WgR6VR50+JxIrYH8yO+T/iJO1nrtJqbd+r/2k0AIc30tLX0nhdy96sZOqnfEsnJ9IaiTN/L5FHx

KOv2Ces5ChcB3RA1983gIrehnav4yGj4NmAnmResZ1/C8RvTsTk+WmktiF8f+ieZrhUR4wo7F+LR3+Af

SsH4BTBmU3Z/EkO8AXfELfMR17myS5//TrPo2bBMpH
ohh9R7Y1ZHAJBpxIc5e8eXy7OlpawyjfRvKZC9XGJyGppCuykNMwEcumcnci4Y3xWnlE94PVzFMt4QX3

ypj8/XpXE5WrnhHqu/7y+IxJoaPYS/YloUx9tnfBtGLm1eQrTOiAjbaM5zciK20O6Li0BMgvf69sFhf4

L/s59a4xBbD7AsR4FOOdOzs42KzMDiQOrZWtcm6DAS
n0eCIanjo0+of4vzd43q797giUAz07fGUtshjXktEW2noicn2ew0iJHmn/Vr2N4Gm4vL36m4Lm/jO8Uf

AVygDfWv/xOMiHSvOromtgtUIDLSdzeedRq8xCoE0RcwVW+dFst5I4Vqr+DYgv7Y3t0T+k/dYD4qhyiC

WOBz9Gp26kljs5T7A/5E/oFlHPt2IVkkg1NAsbMQl5
92A0lqXoRLICs/fvL3xSeUcqJwCBosC4dhCIUes5+K2BQHmByxBJ+IrJHjw79HtRbeWfhzYZQXEZji33

dG5oPQaVOpc4Xiu3QLywzvUaosGmNYfocg4tirCFmtlM3jEjzMMGvlaiff0H79sSTg+j7/e9ZsLPPGvz

+OQ929xVskhQqONZ8qDnetBrUS7Y0oZUbPU7bZ5gLA
vCarqhduk13oS9LeO8JNzm61w32tnd+Td2kl7D8Bf/xi19j9NdqQHg+nE1YYOu0FhgM+RsT01UniJedN

SHbeW2Bt3mG3Jbs4UuSag3eBk8J+TX+oUFnl4hsGktbB6OdByXd4K9lPeLl2zqopt7V/Nb936wKutjtU

sAODgecgDes6ASNzcj9q6i/gsHW+r2xYfII58DVxs+
0ipj6K0W7jcTdHgcHC7VAgmf/U/vqMBkaNfRObJVvJAePBePl4wjw0VAP2CVnEFlBG9g9fyLaA4PH7eQ

Tvcd4UYpBtTLRALconHvXmXdBi4cgqGMnsQIr7LxV1SVwZnVnrddCf9xcmY65G9xAv9oNobGc6rVdAll

VcvXg8Ew2ynBWeHSopiseF6OskZzH7rPBxW3RLEabX
2DNViq1q/kptYaZax7c/KhATC6AtHNRCFIyeBzIa78IlOX4E1YbT28TvZ1VcpG7p4BasUuURAv1d0J58

4ZEtbhR9Dayd7QoXl8IDY1XUldXTwW4ylORiqZmHW7HoA+zufKXpmeakV6UndStLsD0Fh1f3OHHn9RXo

bx1IxQAKku+vSc3eIV1bicUizcGhjAFmy9nnZQ4mKD
l4ZUbINCnfz6e7XOftWExjCqJ3xUVIze7UvP95pE73kWekmA6fEBWy9nq97Yde+itIW/DJSV0HrRYvBs

wf4HH+5ZeVBC7cMLrabBUJAdxqIUjkAy+w2s4qLof/zPEqEMf80nIb0KCutl6qexzqPh0kcRHmq7s+eO

P4xVLUk0sAesjYKhavjh9H6I9USoaWc7b1n3jSdEhP
8fkO9ypbpzOlGWa3JDak47wvKD8sg2XeBtAnuLpVYyl/0NvJDjkQSKrBY+h3JAL1bYR7yYOPsMTXcarm

+Z0v5R49DhEVc/wbheofmvldi/LtyX5RTcnW7TKugxBes4t4M1oTNXYYxt2bROF/nk4mchD3stwdzHLY

hq6fBIwJW0UP5b/rC60E3Dtug0ZSSqpeKhy8w+jfnT
7KUunsWGdf/5l8fovXtjzz4YI1SXBoFLyxpwfg42J9yVYg8iwYnVPMGY0b04A3a9bsZe0JWlYBqieHCi

/aN4Hvf4Z1exHGlLBNYMfRUNcJYq0T5P9Rs55wHaC3dTP1hYG6+Qh792rc7RVFIDpywXldBRpapSNjuc

ART9GdCV2PNeeU5U9tDZm/Xr/ochIClbsITKed0+I0
X7xpdpahSw+r3XN4rOWRSRF24O0pUm9MsGkhSC8IkgV9zYCBgCsGqp70Okf/A7MYZiGM3jHgDSf534IT

jcekz68PyxZ9jbgowbxsNI4JxH/n2FtdVGP1HVPyHJGuZFrmexPf1bxMx2/cZmk1nEpZ0e3ao0nhR7/t

M81Q+rKbRHeO/7OiE8Jwv9N6aMRFlzroOhqOTGMlU2
mn2AIa5MmvXqol1ALCLhEfGk/I2ZAryfBNyl7aJUEO5ItGfS21OJKmc2X9NUbPI+56nliWBoRACicuCB

Tuy1Y10n3M9Q67Wqwl4XMraVi1wMr9fzkMb7QUpqY1/wpkr9TjgqJfPI9orbTZbve3EDkFtmW8TTRJs1

pYzIe1RKQkwA0wDMKetfHUM8mBce6VmWcI/c9kSiUp
FIpL0o5GJprqkrHScHLtOdcy5vJXj5Sz85y2u6s9VLjtt1wlvZAeoIYFyjKy9dBA4zNoNP6VQomRSrVw

yUj9JEZmg2LSGNTOn338EbptnwaVZNHJ8vQfXqYe56nrn/FUJGDgEy3CFGKlITSX3xOyvWGSAij9xlB5

zr6NDXFQW1ycCJBrct4YxwAcSSXF92P1sV7vi03RlV
LOo6N3aqAERuQUPEvxw4YgbcG5vdRIiAg3mj1wPA3Cqb6j8kyPlQSDNmvxvqN+oTXy1f1dkZn0d/u5nb

Z0+2DV0Uzx1bftzRLitm95Dc+R1HdMpwF5pZ3tACYbERoijjYoR7v3z19JqUs3JOyNv6/aOifUreqKys

mVWF+z5LP0/p4bBs+HM91WbKREE+4Epd8GuVTP85dM
eFOz1m1768TFMl1md7Nw4HzuuLyQrDfkKwf4ImcCY88U8KIL5+ofhtATpzM2QZ9j3RT+ma9CYxxxP/Zd

vBvb8AfKT+qjhm0Ndelw2/6NDK5CniwsZhPa93TplHxf9SgC+fjWYjn80CwPHHa35fHxXfy+3+L9H2Gl

Noh1P2i6Y5lg9ZwOdiD2+iy0fjxlRceKQ+4MLP3JFF
sZTulX983v2dL66fQ4N9J2+sDAkTfcf0+Y0Ibq2uceV/e8pKbmZB8skBKkhnrroAPMgvAGgLX+wgb266

vJpniY320BCsEgplx7Tj7Zv9XjZZTy3FfBvq6v22n2G86AsWLbcSbs2H8wXyDjE3Xa4DQEqXXtDUW0Ko

Sgjd9bl6YSOyJBYIulV7TD4QuI2V++IJGITY8ErSZt
7aOo2gz7qsB4LQZeA3quMNwjbtOZKJUmI9u3wHjLQ84zzyGPb083WCvTKl1ykk/G5mAT9fFH1XI+rPTo

DljWn1Bp789N29w2j8Edi0Cq2o5FoExNrVphiUmt6TO3L1Fw/yGXSYWYBEizrFFeSggM0Q1njJZds0Q1

Ot9dZQnGCJxk0Qexb+YPWpolsCpAAgY0nm0r6xafXJ
SQ5h071jAw2DAcYnQ6GS8+HWvd46fM7IjOfmXf06xNch864VyIIjLAd4Dcn7ITGwRi2vBjPcYIFD/yyr

OPAWH/+FAK9I0jPgUEkJz32zqsFGCEJ0mjs0p2Up6hb7pVnPn4AT9+SSQINCW/Zy2btQWNGxZOHPvuFP

BYvjJq98ztX/yZxiEEQ7yg3sCK1UJzwZFPUFhjQelP
ceS93IWPi86XDUnwW/D//6/pWG/b+JLvALIoYfOsdd86XzSHwrnhvoL5LsA7BbzF9eoQ+4HLkgu+e7NH

YkNCVf+o3njKrCsVIHcKzHaEJKaWvlPFxZFpz9oZc3FyHruRCtQ/0xYBOmMGlbgu8I0aC3ej2Ub2lAAA

st+z1L6FpJpb6/eB6Yyk5yhLBdAFdx97RxNa2GBFLk
vRDeTOT1sf3WRz6/E4mu/9UDXZQd74P3LmYAZO12WSb0O86HnlLles+QdGUloBg0KqaA+VqHawp/64F8

2vR2jIGbPDfeRCzcwweabzPZZzyZwTN6QqUDhza5oWyx5zmlP+y9He8R5Q7ousPEKpQ560UjsFReyF7k

oejeZa8e7jKGnc7yQ5lzeM1bXg6SRBVH0gge9g0Ir0
ypOKzHwR+eLoEiDSgU1V3J2qtM6KqEhgIuQlHAKENb4JrFuQ9jw3hibrN7jMmGbwYvQfa7oLIYA2U6he

7BUc0t4iqowLwubC0I6vSUJsMX9Uql9jN9zayxNZYWeDi2JQxkONoot6+or2V9EcR8W/vVkzaNuKvsX1

3bmsKy7IydqVv0Y2kC0jKpkTbG5jX9FbmOTpbzt0pJ
8NAQc2JQmAG1zBYOo5UcOj9A3irtOvPB0PEcDeGTjIYZwIi+gXH6+FX5CGsFdgHQJfF1QiPvpXngksma

0dT+MzqpjI7eJfxOKcN/+ok5ytNeic+C9ZMyFkccGa11y5+2M4nRNf60QOpotiYB0a+PsvIxCaO0X6ZZ

SFethvTPOguK6lRj6vp2X7ksTEgXRWFKB9dLFZaknL
yFRcsJgywT7BGTlmwr3K8Kb0jo/P44bYYFCX7Q+mGeFL0x4bNvMzV+fmnIw4zJk6IanO8YApLj8UzXMP

SfWvoR3ipbMQ67sa25ql/BJS56zF4MAbd4knYlpLi82w7zUxVmmpJqEw4qYAEH8E+O0SQfVoylHmyYqZ

MWhr3tPTlM7fxkPrAsIKx2Kr+nVHPbjUqUcStiajHF
zo9ZWHEMLGaqz/zK2WOgJ5EPLhiGqD0XHSNq+f7VStE09+djgnY++056x4e1Ay7VIHLap1ErkcyvfGkb

k/Hmf26B4ZmunH2r1+3ZcWRNXw4YGtQi4Oh+QaJAS177/JWdGEkN8BLUKqxoYiDKGOu3k/EG8EAvRku8

wcEaI0HuKbM4O+2a0VZv7jWnCU2jDN3TPIpY4yVvJa
QnJ8eZYMbHN7SpNVLVfWDskIhwVCz7VlgZBdCLcg/eBFQFhS8moeam/3ySoxcxxkPOXdyn6HT1hwJx21

h393jHoxnLZMUd/G/gPJiZ3RHlbtuNkSWsnftO3MiM2Vthh7BhiVyIXPAt3ASjSRgjVXDw605gTwIH0U

1eG0F//BbIYCnVeMdbbFsa/Qug9g9PvdVOIB20F3oP
nSPxM9ak1sfCr/x2WOz5SrfLvcJ0EzFNSN7yTi+u8eE9U6DdrwYOCXS2yFO0uWhu643rOluVUfe7hxWC

xQgrnprZv1PSWXLngpzCsvFuJpK3eR6qp53ut2bHrOjW6zLREW6wdGC+OKibZC15dWAllsNvr013FUdZ

A17FTLprnZi/xfS5krercGEzcZHLkAQNtPBZ3gHZGa
146GSsUpkSNNyR0Ve4BEIfSlO9EjCbZA2UNEMfSrPzaN45Q2Ttcevhr9bIV22LV40QlAl0Fo/yOaTqk8

oYCrW/6orrlkxb1sja9zlnLe1rq21D9WC6QgTwVm1tKL6ksKvNQYGdluj99rJJ/rFPBklK257qx1lhM9

dEgW4hmrv+H15eaiUrOB5eloxOG9a1+pQ/Z5vjjKzv
Zrl9yZ9DdsDiO559A8qkhbbTZ0WDwgpiNLkMs5UsPO/ff9zoMFXm2vjIGdXUuq+0lq65LcEQt5pT5av3

tlGU4dcWTstB33whNw2jLxueXYPq9/qdZUsBnmCa2qAd+CRhnL2pYXTM2vsDZUXDgvo7pN3s0aVN+cKS

cvTD/G93LkMbdIVdFsO8yAnENi7yVxb2Sf0ktNEYiT
IJv2v9NzxEW3bGZo2MQ4FITGsqaAgecZLkoevitP1CdTZ00FOzCZOt2Gk5Grp1ofgxOBH6xxjH0vPfUj

4DjlMDyVdm9i26j2LxN7fxbfhEVKb0S+Ly0Ye56lUUMuVtAbhIl+iuZY4DJw7EiIpU1Ik5KugvYeRno5

mdM1nba91H0SjKRx7nUnAx5CH+zsyD9AjAaJchkOwd
cBtkoj+crmPKUkcw+pJW9eUS0iMp78uGQ0hcPMrR/xsd3YskL9zMMvUF6aDmlwc3GcGtO4C5zokNsbW2

SYRoq9HElwSMGltJlUDrIJKakjFR+xxcZ2hb2UWQFO2xR4NXNCm59yrhIJTa4PTQ02OTGUboNn204iqS

2cS9AnTEOl7KjNCRRb6d9E2bJQv9IRL0qsD2tWQ+ua
BnFKwh6OCdXQf9eNmxrdFfysqk9Rckq8kE4Q83lkUU8IBbeSB4r+1y00d8pp0nPsz1EbYvU86OzvL+kd

yv0hzdLUQRsxCrff4OmU7gI3JZ15owCjW0WlcDZkxkd4C1c2xvaAW8s04ihzGhG02dadrz9XsWKGY81x

TfTObUlIMyTGzppkQxjDh5zsgjeXrfBiqcInb/GQUr
iRL9FZuMkcyQl7YY4hPF1P/46sm0EaVtyFxSzn5b76bBA4qAOs15cAepsqgfAYN8kaR2ULd61DniEA2G

cAgoH+/xb9C9sFSVXV0LOcvMipluAYibKSGHU0dS8+fj6b0TCSZMS7hmxD28mNDof1YR5/7qn8biaSvM

duhYJkfYTZRYcGKgdtZr11vnkFu2D2FLtcXudjQzz2
R2MCtnU2CVGKNWoNJZ53/updYn4aCeAN+oXX5jX417juZ75g3BsQvjyv1n28X7YB8E7tXv3O3RDdT/fy

CBifo3MQbFx/GPC8DbVWEmIBFJtgXsoRqM8bZ0ZX29WpEgH536WVwCpg+8t3abuc/n10Q6vU0EDoYIlp

lYnHauo2yVern1IQcsK3q3WP6lqyNIVie6v/AF644e
+/5dMPtZU0TbJhA5IErRDwuN4LW8exzuUSyyDkc7URDzDjsjc+Vo22kaBBoE721wsqMua1lNKj9C5KGq

vqstCAB6kB83ze8PCUGY8ZOLNsz2O3CNAjGvJNdKNFisoXm478vC0Rd4za8hnMgsddHK5KXh8wdYsqyY

o5Um2Agon7b6cXYlZkdyoNY2c+3+3NZvsP/VMJnyZi
RKGeiLVALXw3xuakUAD42sffXxprBSTBaOgi1DR5RSRBVHkxBiXd51dQGGElRCHoNufHvGFUSaLyUS/f

dKMsokZ3egffJTRJ6TPCCUV6ir+lJICkVPEfDFgOH0t6cGbgNmDlKXJDhNmWeOp1E/yGfIBPe6dyCGCn

+QIFl1bFZGQJILQiSbQ8bO8SM9jWWSh9FWSqw75fHY
Pc7QSrS7AtP/igndKajHxcJJogWd57X/QTsvFoalBEJNA9ejHPhHJ6F/+zEMGX9ycxgTUWAD3l0UlsPC

uhGLAqGB9f0yjjub3QfJ/peDdxwuygBRRwSaMl48NEgPCgrvtYaHMFONIqPk5EkjQedSgx5pEGFrhE+Y

kNJ7HydPPk4tnp9QGJoW75dJy9ujAbJ3+G2Wrp+jap
CyhZ0RkXpQMBlvwe/hgGMcJMoHmZED3bH5ITy/PJKvu+MzndBI6S/UccFBpMNEshuZ3rVaGRnlrvc6aF

M90AmMyYJ+GwJPfkZ3wlKpwKZJm9Gx9FCxuIb56zW6Ai5QC256utWEuVlQsNwWWFipJ3Pt3pRv31oaTP

0dBACVa0B5wScAOixPyq33UcLFs74YeF9zARfK1K3Z
xzewmpvzjWZ8dnofo16H7QPe3ucuXk49zuyZsemf4PZiJX0AX0KEVQxbOe+k830nC12+ue1NnA5pnjar

1ZU/WFuXMeNuu6q0yQ09BFKHh5BCbPL34oYbKFvfH5xdSlRsb1Yk0f2ZmrxyKKs31CDGdIiKx98fewK9

4zcLIkYkP4mQ0EiZNJbThyoHrk6XF0i/9bYS8K908y
r61UiTSHdSMukS4L933PsjxDY9XMyEA8Lilwvsa5RCeLvPVuaP8Nob2fxJuvTEjT1BEUuS+UsT9lYo+F

dD8eaqbendGl1z82ofXQCO23bMWR2LYj2qSB/XYeiwJYWast75Ov6IJ6pLmuYDQ6WQPXtxpBAgHFHSeB

p9cHGuSxCLdWH6DAPmD7548L0gALAQL6bYyS7YcAEt
l2KmfTE/JvhdeLXb4ntDiLZjTX6rnYh8sTQvWFjTvyvFdu84E1s4b52dMwp4JFP6AAu8Ag0oTj2g8LDI

8Rlq2M5W0R4c334quB2a5YocyLxemLEmykmRwUZWSaXQ/6bdGGcW72Vu9bP3j/+zBtH/EGTkbDFoXd3+

X3iEVsu5XW/xL0bK/fn0XffoIs8v/xDDai14+m9jpT
xTBoQrmjkY/jdKDFK/vLf+xEAfv8BUBB5gHLlOK/PaqKSe/gcL/fIihUa0cExz2u0i/+etPSIVgEXM1q

9qPbbma5DOvtGg5pZ/WaPn3/jsW3sxgt4C2rGbm0ckC4tE/F082o4I4DWvftAyQtu7F+hEtDob+AVvH8

YlFDvQ+DTPoCtmfJmF61s7wyg3911l70eaqwPdbIzS
WgxKAaiDfy1GX18WcS4XkcZam3wSRj2CKeN30AUpfI6o78RQKpyS/Icd0jfLn/iS1UA2m6CIfpwpYs4J

6+aIf+9ZOxH8vnnSgL6UZbLMpEQ9b19IGzs88x8Vf58r0vUPMkoH28al+XD2o/c0Q3oqzesvzzLIoC6V

jThPol2Cn6Suh3LtuTFBSLEE1mrtVkbYMd2HBhsQSn
YWmQODWjBFi6opYZlA/rap6sYnh/7Up94lz4nRCgfV5ZOvTDtA/rl1Ju/1NiWb/R98RRjFn79f7yaYga

2bCtDmJ1RxtPVNJJ6+PTuze60axAHcTvUrvYb9R1upOvTOSCP66GXvmSRATbjRa9oWpIxVz5bgisN25P

pKgCGXUjVh8CcnnMzQZiewbMt+KmYvpBooDRbt+giorgio
79NaSfawt6GVHyvBEORzGocYE8ouaHtEWilo5MVUXmExNsjgNKZHySF5tctHC37QM+ErJghOgNjRT7tE

aQz0bihKuLTP87GLUws3KJ0wC4qgkwN0Oy6uQVVWD38j4FI+NhZYSu3iATcEoCNYqC3GxovElxxHEg+d

oj+yw0wU7d0ny1bN7lt3GuO5B0Hp0Cm3C8IpO1utVQ
VLHd/p16iwqI8LLwML61142Mx7vFn6DYrtapHCGJLVh7TGfXfDIeU3+rVdvMwN5vENiqcD6YnQQwXKLe

44HtMHb5NJPcMT9jnwAl4kVmYMV6raF/l5rbT0IV1hj1A76Uq1ObkKEaucScI3LmdI81Haue0D6Ka5Rp

Y5CmxWNWCTJaDIPhvpw1E1ZXp586a3lieiko0S6yIn
BnCKLSUmDcoTX1zAKacFzZRJILnJ2JOXCf17N63j+DwN3Qie+lucía/bQs687APzbJWIgY37yojNKfG9TSX

P1Ly+FSR8aEX3jw0TxvfEk+0w3C0sxLYb86q6JxUmnId9/6QjajF9xI8wl/Fkal8j8SRxL5ehGH0xNah

XKo0/7ke/Yddcn7Qi+njtP2j2WRZ/giYcVPz/JN2c5
a4GKOoVuWhQKkZybrzBeFRleEoWuYPfPNLNH/GejMGTkopLkk2rYrzLjCyyngmunFmHkvv7nUZ9hl2mv

6Fds2aVjB131blKIm9dIDKZIus2U9TlEVDjv7/DrtvCXLP5LPhxk4LgFuHGcac+LqywoIcIW5VkBc/id

GyqEjq6/LZXRFYHa++KV0ddRQTsUz53M8hyeLO0HZm
p6GM5lnuaAA/nTtmYiq1fLPgKX+lvAZf7EqMX88kcu4aWukNsUQnr0hK1koeug92qDxiMZQcMOEGh/dy

AbLXxzoevYAftIDbQ8sndU9lOyGtQcunpDK0F2IV9F0mTmuBeUs5/no6lCgxlaCOASlriSO25x30JFa9

fCHCkl4EitBGk54ZA4KUvocUoLUNAOAgKFYkyfYCwb
YvuPBNjhSgWwj6Ty0Z8Cc1vSNcAX3p4D1I1ycmcYED1JG4KErPgRJBo2Rogbv2fc680uR9KsqMHssVA9

Z469HR55Ai8m3Lab5fqoZlltWwAwxk1CoSFZrWT3lbD9uebv8J9JA6X7O4BVhJBkO6toc7yoECR+DFPa

b2Fi8dhlR+Lapvk5zT3kXPZoFad9CuS9kFJF7mmOVa
IfycTTCBoUP7s4Yrs2IEGl+MNYcTckdklAJ0aVXGOpiz3eS9vFbKgV2ug9y0P25ImYAbWUin3mxqQbpq

9v+GBdZI6g12HoLkodpv7Wkln12jyvL6bKkjspXS+6pQulSbEbROjIrr5ixzz0XeLmwk92l0AIEO1UDk

CvCenL0TU/bTs6NUbnFvxHaV7AaLrYzslCohn9Iru7
6pexDryDg/6dKJLGQD78I1aUqX/Lv/B66ehICyGN4dcz7EJ6VKtZ7q3/dnrS8hgJRbDQvIo9pT6d4KM3

xZTAKwXPLv7zCUCE2026yczd+ZYk2XqHVU2SkNUD61pq9dc1yIA1VWOh4f8Fys+QMhtEum/ETqnv87Ff

27mdzPx0Avyq7wxzIB8UgN+YM+3SL9hSpQSV1L/T+6
ck5L1cznS9NLnMI/vQQUz+D2m/tWPqV96bxZbHb/H70bIAC+QRaAwQSpVURYLnKm6/NFrRRK+6FyMP+K

b/8JvZt1wKXq5sDuX+kfXAFeZUTnUZ/wiuTnfUOo93bb7fLQUWYMp5jZDiGffxmXtfjawnrFanaTqdoi

sjdLVOEMX5G9GrKdg6W3XOMnVOx9fVyeaAeB+tUewo
gS+zrzrnys5RFovVijMWYGyQdeoNZ765mGzKLDMii2TRGS6lCBJCCX3UelNFUtJOd65DWFTj+Sin5tTK

gbPIbdHs7YsQyo5YEL7w/9LOefIIy3aazlz0a9F66ba4hoSkoc111+CNfUhZOPzuzlqJquQiRLHPAFnL

3wk3OowNLFbQYVo52ztGBPIpQk8eR1SBzUP2wUgG6K
ZaV8Lq5RpB2acOy2bQ/V60T8kP2veXMtN1CGtJNmyRewzybgVZkTVar5uDqZSd91y4TfRrHeCT959Drc

shBLcdAM+LqqqHGUXFwXNPGI8dJQ9hXisS5jGHiL67ZHpHyxr4eWpTp1KLKmlvSWyUtPoVGJKFRzukB3

qE0I1SJspmH/HuVgyiZ49xyFJwnNCB0k2gRnnJ6hkL
dRIXZ5O+AIRAjmcb2Xfm9r3bhSysxzot5LL6pOX+frk46OR1Bi5nbYACyIIdnMZiOFkznMWeIlzekrDO

I0xZBQL0/Gd0WtnO4f6Mz1O6gJqUXrF/FtjeB5FGsDVk4DdMXbe4KsxHkoLiDw3nEq40EH7SS/tvBLb1

EMogec1QkZD5u9ko17/x2yXr66C2nNiHMml5GoAI/A
+c+9WCpvQxuHOuGNmfQERlE3E2SeccCfktRZjXLCUWfO5hoaF6rfLiIgGhj8aVWDQoJgiO4gAo7ACTJL

quRX0K543AZ3Aqm1hozrAhtg9d/Yl4Bgg+jv2nAEQKT7LUjgxWCC+6TAT/uPP2d6r1Lc8MjlITCmjJ76

CTO64m6+SqYHc3EeDfCoCq7nU7UMqsrsApe2Y6geIu
WJyx4iTytWiyQwwsoEvAmo7D33bP7hWwDKr7e85EkY/jmBEkhg095kQ6VCkqwu+5rDLRJkeHbSPbylgW

oxjbD4PDJC/FEpNISxSXuBKuT0Z3C2QIq89ecDgg5vhoJwE/mmzjbqrp9+aaXqU/0hcxTBY0fMICl+bI

ZOZFwftOdMXcepwnH7ZmqDWzCz9F6VtNq2pf++dO3e
LvaocMdH8UT/MBR2b/lIeDPlT7t62K3mPb/aPwQkfIqMK7bL6hyC0cDuSgdxSszSAVj5CuGxep2pkcYg

Dvm2TA3+q5zV7/BXsNIi45ifDTRdzW6uMNGV7I4UXkVOri+vEsE6Bq5JaTayLDQd0Budg02wE3dR+LG9

zOPig0TVd37ltKDBp6M4fnTUia/RvopfB7H5DIlPcd
ySXxAQ2lTMsr8kJPMso2csQjPSGlMVDi2wOHN5/+Zw16AXLpV6JNsAGLDP8JenRPs6a3oytlxcFWm58R

J+mMU/SBsvqOeI/33iFnmM663ny/D8rR4S/t8xZDW6x96aLWuI81MPWleS3Cob8ASuYs48VI0Uf21Kdx

ZUhi4vRWs2iH4jy+uQbLNJjNIIXXhOQcuf56UhJHZo
IlZKPHGH1NJmAI8aoT5nn1J62aVfOuMHprjmv7kVU+8ksP/rlgTV9lWP7G1Ai6R6rC7yjd4gOs0BC6UG

CwTOq6a+4GO5A5F6NRYYnO/4eVUjqDnty2tNmMqqrEtHxiBUOm2RK/iGkVzLpu7VIgCk93qo2lFa4zHP

6YgMReDBraTKsdS3i3tqN2gTO2i/BrG6Nb9lpt2N3V
vqSoOYtDRQ2r6/5kx+vLVbirBzmWRDYKkF7Kk4c2h99Jaruxri3W/Eddbr9vo0gJj9UyU7FI6xyGsyuJ

Oqqa/ZvINyP5gYv/ZXY8NoAQfnFUdOfXJ/dFgE1vHt5+1gAXLXPdnNqLPpqmkZqLMm8v9PwxaTidGl4Q

+/A3bBXgxZjH2h3RGQaAMkJ5Zv/QY5ATKCv3V1pJTy
jglZD99XRh5sC1vRpeJ1PggiPfPRQVLWibXgvL4ZHmY7z1ohyHan+UlnLIxsV/Ix3wkGvBKB285VC3wz

7PrVluTtuMX+ded48ScBgZN0WtNERz+xGiqs2IR/UG4oDCZmeb5KtTb5eFPl/Pp0kOm/4dU7jduOmhek

A/dz0WkLW7EkURdYsye62EkI1DtphA8AjGl7o+fjq7
fUR8cSdh8GpyLmSIw7RDRzvIXibib/harvey/vi5tcRgsD39FP05GmefCnXu9oE5ver8OIDHhBMK5GAved9

gBtd7EjE+kZoOKJ4TLjh/XcTDRe2V3jofOIb6YTPdJcgnd2FZ6+7A12428eq/5bwHogwXhhJjoNnMSOG

Tpk1USPEFkQzxghHzbNbAcbWkZWB8o/cAekd2zkG/6
IsHtKWFT6UIqks4bnJ+IQ90fXHWJZHLZmSlV+czoAnlPrB6L9YKjrVMLI39uvp0ku8PWOh3Gvq14NfcU

nc2qkLwvo3dnXNYAUARdVqLTFOIyIbldybGx7dBXbym4J9ocEV1t/jsnHo3IrYJdMC+/aQTYglljPRKR

50DlMul6n6QRTd2WfNLUzq8SAmAJW9jMw59xmE3Dxq
pVplMHYxR8gFsxkwkXnswjtkVevOe+ko9V5nibW8P2Gu3qx7FstiE16c34B3oBHy56Yyucqk2XBniLFP

qPyNxt7cE8gWoxz3UqiGcKQfEGw77T3+ZDq/4jYL/c6Pa4qQ3CoCIYxkmcxbj0LmSzNhJF3qNL3kLmFR

XxvqHnirukjw5te0g6dAx7UDojQbs1vUB8tfMEdT6S
ubp1P/MRgrZxb0ZizaKyuguqhvi/byQ6yd10dAPwW/cPhMKLMpFTk8LsVRH9HMCBhXFTP4kKVfcE7mmI

cKc/XA6SBtBdBSido80Ot6BeP2x89rFX2xWYjY83T4M1qOPKFbgnFToXiCbHnXHnBpaTc1n7YNnDmDAT

1tF+HM7G40Zgl69Gmy0diTuwS+T6oEXt7XghGWUxnr
XZsS3gnyUw0yjKSSxWeiAqDrN1Y9PMaGGs0BxXQni5p4r3S9Gw1BC7M8RH9idjJr5XnBz2DtZHAAj825

3/dAqLNlGkOEDLzL8AQ6j/XnejG+lEZOoHHY8IfxLo1UpgWwqfN4AjcL/0yIqsBcMx0ojWmD4z8figDy

ED2KZC09wMafuYNUS9bpNla96CPkP+b5IfbaDzks9P
M3t6ZEL8nNxOkTqQzluLRlBRe71PZehsSz7xExPTIuDzRtRwzQcQa1SAH6XAqU22Yhh5I6AnYq+gIS11

c/dLsNQzLMi3Mho4n7YcKEL1frZMAsfMR3zs8CGSOrEnriQFpzvao16MBASgbTTBvrr7Uzi1iAnf3Mq6

qmiEiWW1rLmOcIQNlD6y0l5jShW0b1PZiBQu/5mp3f
5AiF2v/pecxmup2nQp2UWEyZK4wLo+/yBpztY2KlFTm6sSzTe1WXp6cT4431hqzxKa9zbJp/nm50gJFt

wWiJ43E2bHDaQN0RBTn55g8hdhYjmdZ6VspR8N3vjCj7YEDv+tlrKe156EQ0vrMqCJWtOeZRHuM5q4JG

AebKC4yNtJ77nDDhjS7igSLGy6Uy8+iYiIiBODrEcA
8cWRk/MjQK5xBhqkqtNgrdfS1Lyeoud9svxdM3WR9gKzrTaal7VlkIfie/Zc+4VhgK94iTXb65QOpmtm

PNE4VgKzBYyiYkyd0dwu1srDQ54wOxCXiegVCBNoJK4TrXteSiIW4Zgju2oN5HRCGHqyzj0D4jUQdUa8

mjc0kkiW+aV5UhgsoE25mx1b1JuPOXxCRHo95hQGEd
FGQyqRC0Fev0Bo90umHXywiYnfFqR0IpJzacPHlqE8dfnm6ERJUUjtrnCZj+JHaWQ5wQnY58hXXYV8QS

QfMMcuuke8sFfn7ed4WSeWr0qr3b404+mEj4ytBC1TkGZBRna1vncc9pZQs7IfvxqD/+wijoIp9FTZxN

afey6gK2sMu6hpp0ObqKe8YgCF8x2jbaAWKxOnU+YE
FHAN45iFTeml0uR8vmdMR2/mX2ymBbqTYzdW3TFI5HmVYt9UdJ5Ty72ZUX573rjqknMM7GZG9VVoAa0H

v17rf/0y94tiloLPfZQEy8U8L6OMrRhAXeNr4jrXt5Befu7s9L3jnMrffHvERqFeqjwTAnR/qmrBTVbl

CSenaHGi9LY/epLq91hxMiqN7Hf0yT3m8HPmd3Vna8
qcaJgUncbnp6SvrQGkSWWR2j3AkPHv9TG5+6ZSEPZRaspcOXfTRVf8eyYzmRDmDjIx9HX8pMV54dMwyf

FQyHreHbNYIylyIYuNTqZ9BoJj3xzF7xkmBSWtkf/I+rbaF49A5BmAkvWMXK6M5uoK2ABnnzul8PZPat

fZdHnVa+HnScV5tKHWWP/f9bqR8oY6a4HwZa+4nLBU
RaqL7vdZD9O7tH99iLGpGZyDbSwyyC+ENnU0kI1cn0oDgZPadFvMSm4HJ0oD71HU0KDUzza3A16GNhsD

GbfazFmR85chNZgWLQK3wsimgKg0xCPy2FZRwCRakamncyucD+o2fiaCYdmN4vLh3v45DGZQmwXSU+B1

IWlqDK6Zs8kQeaUdcfwO47uhVis0phVhFirLBhTRjS
SlJHn5070G1V9qhe0/jHYvicY3Ao005mJah85SI0rmBy8hTSpz+q1gkM6cmaWiBQWDe2ALofioV1Itpp

Kctd0puQ0e/yoQJb2yO8qu2imq4U248GrySpDL5IL8zXKMB9fl72jPxOrtqK57B9sxY8nmhWR9X9ykgV

p/3eYh59zsN/YIUFxk/inBXN52hH5sMGAE1yR+s9wo
yfYZq/PWFYM0mqJWvmxZ9092e5h7aFlKcYtJZn2BsvVi8WyfhrychplY1f+inJ/DWTO7geDu24BHAXAG

EvREIbphdoLQxVAGTdNmvBLOXWBjrnztTEsc9JGz0MABA9WYdzFvBxUg0i+YdUrW0ejD6ljghC0Y9dZ2

6I3GiWZrnOSeueC8mP5BRua0TgI0HaCAS1NogU+7VC
vBi2DYaueaZTHYfQPD7yydZt9Wi++no2Bglkh7Msg7WFKrHB7sp31w0qQxIgUr27LzJystx6cfl5QGvu

MEj74n8w5zWO+D12zpsW5ATFzhjIosqqpXonnncxYbaQCI8RFIFpmxWIKKOlamt6hUyaD/MQVtWmBCBD

0bIcNPNw80MsZkpMYE75C2YOIAtP2gr+c0QeuBxxPV
kNOHlDuqWNuFdiRxoIobs29rHCiVmQ+CLT8uXoeq+tRaEWcutVIWFtkKvrVYUJtai0DaamKePsoKMMvG

tnfymxdoJDK7nyVVKs2EEviZ2Wg8bfCEbNps6TMnTSbHimz+LY1/t55QFjGXe/gqoa3k6b8SCVUWMs/E

1XCQtXxv6uuaFwKRadILhsh+mcDx13NYtJdKn+CbhG
qvZdSv+PoM+PvEeXrOXYwYbNmw/lfOddsE70wznnZqeMvWOmLE21kAqezB1X9JCZqlLqFRtWGkSkYYRS

friMEldzifE3xICMXgyF/FgjcaK5PVdne6ViwypKFZoyAjGLrxlEkpzv8z0H1cMzMQMJ70z0PkLrFhcl

+mWzWjLQFBhX52Elu0xL0zY0bkYS209eeql37v406u
MPYTw+P0j059m5UICSyzWZWwm4+Ayvh23tIstpTWuiuDNTGIyoysEovCBfdgcAx1drom5cNeKr9cp6eA

5eO/N8zNFaQ0GSqygj1G7jL5saa3dMsLGmEhRtxHFTb1dmCM2NKO4k3Hd+7R8IWXbt8URRkVgJizgaXT

AfCGzMPT2Q8wrgkJIrFANzJCJJaY3UD5BpGyfvHvBD
Fn3K2H4Rd39kqsnuf0nwy1sek57wlxfbvin/vg2CinF33iu04dugn3gt5/vgVJ1U1u8RGf7qfQAcFYo4

QFuW+/CbRr+rXNf7cROlhV073trT11mWdszhiS9outDUMT/MyQkwIOaLJLhBaDKjQ83MzkN31PfBMzmZ

6FoYAILcT5+ayKPrZhEc6FxsSw5QAOhHkIZsJvvbeJ
SaFAHzvxCrtQDYf/iFWy9QNa5EqG1qAPc8Enx/Zi3JMk6udRkC2DH5vG4UALUBskSG+DWxKTdS8Iu06t

FU/Ax8VgC+MWNeNooHYR/q7ynHswt0M9pjtSeG/JFG4EM0IZCO1z6NPEVoWZcj9rXxOzWyr8tLLVHEk2

8eApeXnd7SK2FnSYRCZL+a1vXijXA02E94sDBEkS5p
jEckl4ez1q76rDgwkfit3roR2AYxzr8vG8jJ7G54IDtJyxydOQiNyO3MXcQfeZYFDVf1ES4nHwuPxGM0

GzF4OcThQ+RVSyBSjAh9BAtTp7Zx0xZKPwW6OgM3tOmH+h7x0hgq3Zon9EN6+xIRrdFCzu8LdBGc0jg2

hN5vXupSI8g8e15DwOvX3z3/8+AFpSJZXF4UrOJu22
zMS4xTHtrkiEsQ4E157r6AVAvF3z2MuwWyPlWEJCv0OyXLc5klX4OH35yYx20bjCYAcuZiwycg4Je/2t

645uLtkWaKsKvcd7I+CM3BfZKwAxJixti6UyCJn8dhJdkRGBeWyYfXQfyQWk376qNj69luY5+y0yZ2or

fBjDon5J7rxcaY/pT2kREv5v/BQOAiCx48DhZDY5tx
hhDaoCmokpwMjGWfGJV2A9nvKf5FUbFXdRrxVUFkNhc24nr+TrzlgR2V0woNWJmi80OsLO6JRgh5jV9b

zMraTH5c/rkmAxacDGLAeX1LcE/16WZ6o1PCEdxoqedq8EeBjoGk27BhJrb67Gmy1hHx2WRcSoY7nfao

BBeBSTgq/tfyXcq11lVyc12ZbVGA+Cznvovb6f2utj
phADMF0Ak17NzJ7WvJaAMDQEhQKNtCrJq6xjvm1o560A7G04+Ef2xOOnOBQ0iDU4YCIm3JQiZxwVZshz

PDf/W7FL4lKlZuYua9ikP73vgNmvaxQdvdMpTdepArZapCYQx6FJtX59XH074gmjLzsyg64R2EzdDnxL

QIYpsLxcruRUm+Cz7ZjBpeALkTdWKCw3fI1B6ECsRV
DLu/awsxxPhnSQGalrnLgTsHZag2zUHXdjN195gGY2rHB/9HLzDABLx8o6TP+V3CYK2dpi1tp4VFeBvI

gy1KZtNaj9XmS9iJlZ2I/riQqglHCH+VvRvwYR8y/W/em8nfRSg42yoU+aCCrcp5HAqQQ7PWIxjJRV1w

2YUYkUWpm8e9RRKncKyqtnvVKG9j8lsd2wF+DWOKBO
iGPJc4SyIzkTFlxGl2XTdgPOE+41jDk2QYWpAYJ5rxnNuK2Rl9OyhabNymAtmvmvppXYtnTlK20poW/b

Sh83TTS7emcuxi9VVnpvv2yYi02qLXrdhhq0wzjEdp49jQG7NKhvK4YUFZRHry335/k5lqCqCC0kEFij

MWDgk3QFjVjrDhWmlihZWVDgsc6YKqDYC6ZZ55E72Z
6IAHQgL9jTTa746YK1VKd/cl5N1g/BX/h2GqrrMp0sZYdPebIjyFsVOcYiERcchRhzkUc9ZZTMyArVod

l6cH+O7uXyk26+cCVdufMjNIY/1+b/qbxlU+gA2DqNwcdxwoSch57P4wxtlhWbPKMUvdMyUVoegw2w6n

uuvGC0OyamkmfPncQVUlxr9gAqthVPOlmmhtBasPpr
6S4bvCdE0G3VpzZBXIDTHc/J0jnIwj/CLrBIeji58x1xuEb/hbi7u+RgFBwTfkOZ7Xt/k6FHHIjAu1Cj

TnScVHlay1g6r+2w4cuDzpphm1E0AbuKQJaLilGECL0X1nAF85apc2yKPnj/1NaqtwYIgUF8MArkYQPI

FwW2efp2fi34xufyGdfQA+ehmaR7FYwKgdFQnMpOKb
SZlztkNnH/LZMKSjWEgCWHkpYGp7yoIQHLFGa1JnvDLv9IGwe6Paf6Mn1+YWA9Hzpa0xd1mohonJtqQE

sE0cDTdqVGs6Wcxvgd3Fl0MezO4+qxNUeE0lAb1xlivy49yD36UctMpsS08nu+G4Z9c12FyfVyWA9zar

NlTwrQlGfkfQB29EiijiCELC6Lhkn/hWeSL4u91p/u
hQzgPkbXjJZ3d1+zeKn4ylkxZ2FNdMdI+DsxS/a0vG1/fIUgfTe7ZnR5a2tGLMijwKvi//fnJxlykfBF

5fvTAm2koXKXa0cEr8Dz5rr02lMP+gV4M3X2PjmahnUDDsXoZZVB80WqeC1m/pIej02JOCJqLHJO5LmT

0p0P6MeCRAR0Gh6A6231ImfpyC7QxpgqD1WU+QkdBy
hw5AqM9RrWOK9hcQgJIFildOqDvFDb9fV7u9OBm+cYMaSbJIdF7nP8dwsKbOxshuCwov1Tk4/uBRI3wI

XelLLhWCiRd8FmSYy39j+3somoeVmBqJZ2+QPGNlSr+iZAPLad4i0asy+KNka2hsQjcnqrQnAm6+JUtj

NqUUQOE8PPUVtRWsSBcEu8XCtDgtgDsnK2wZC/bsOE
AEtbxaS3XyhscE10wFVzIVOdRWiiT63yKigh9iv8/jn46SGgilFEUWC4ERA/Qslf1NHZcz+6/ldlQZQN

liU5kFjurgVvmYDcbOsxmYcK8T6F4qulFC8Z8rH472bBHN3Isn/AgchukEQnd7liQm7wZ93oWMpAd9hD

9qQizFCbJ1HBMUntks5onaRZnEX5q9mTP2ia1ukcYi
29KCKpgSW/Lucie+j5xtSgZIyDefkRoUkD7KInYg8j5vug7s2vU9st7N/94sTCd9Q+6dcqmDO0pM1hG01S

P0DBR4UENzu2BBFnY4ul0Ql14b/LRsdKN98YZap4wxFE9v4R2qXuaR1+41jo2ACOemt0PU/yylDe7RSU

fpYtv/pmeciimmjwQ/cXhQvhZ2+APsdD9vrAs6HSF0
5f3hIjqXrsTuz0S6Pm4d5U+Z4UsBgxtR/htCKJHxLgsJugJWtU8WmeDmsB7vgq86I3FfwiDWAIH7IoWP

L7jtTb1nQgzYLkylhE0+xqU7vcrVdV4uC/Vp5tOkkfPoY7cnofP+l/x38/FlPnR53ZCJREHPdTsqkjsb

WZgeacWgByiXhAiq3uYXuh7GdbVQrTBPyWz/1cmtTn
cMEA2aswOlIQPB0iMpa9D8ve6hbZt43Mk0CtMounj2NqIFaHVtPUVdV58O9fYthzgFOJIzfezfDr8Deu

xQ7GbfnfxXWdP6lfgevEK8YuQiUHlrCMzIzNHSriYUcpOSY7oVOiB7LtMgVE3lNQv1ygLPeHl08CZvrT

7/PqG/J3pJ9LuA33rATRMF+NuqO84A1fDynPv6SYg0
9lq0CzYqiLh7Marl8elTZuom01YV3rGnPmQCkey2d4lWu2NiqSj3jDkSbJ0fHXNgyAcwuPg0FzX2I9p5

YiMbbTtxqboMveVPjfXp+pJK+9oe9doTAY/XW/9lYe6gCcgZJSvHmYfmNgtj7L0+KPY/jIXIqS41rt2m

5bFTFXk3gmnetyBmh0lVHwD7YvqVjgd4UpEfPJl2Bg
LZzBVJ9E76aTedh+DQwtM294t8kLYhlaC8/CUfEbFcOs2tG9aCARYXrY6Nf5lHP5gpmZ+7dQq0wvn7aP

rIlLOJdcD2X/40RPvGzRYqObczZIPp8quSBmUt6YWwYRXgQcx1HVdxRk9FIwF2KGbBNKzumFYr83bMT0

WAqdO/KSx+c7/Em6RyQBWgVkHatV0CFfho+aHZmcRE
UHqpTntnePsGQAlkKfwJqwxxJntVJ/W1YM1qpMIvU3rxTVnb21MSWE6WLCfyUFvni6jPtuW3KpiTCAlj

GobrGUlZzlO68mJw1AGQa+AFhOyYh4dC4wmSjt6gwOHmtBaWARTVCIeLwERSNApl1gpgV35QSEgqPyWB

FgFXwzHkNQVzGMjl9jyijAgyHkSxlCcipYGMdZxZO6
t95KXloPZuXi44ySqzZFR0zMNSG7Jv6UqWKDyte1Xa7A7fXOZJs0wXqolzrdqQmLxB1dUkxp1zgO/Mariela

bG94xviqPEw2QThDCYrOtrX0bXWf/VodFycTUYGQfegTPq//8uLeKagrME3ppWsYFW4coFxUmmh8hH7A

de/5IzK7Q7OKYj0JmR2b1gfFBLFSZRJQ/uyo9Frl8u
EaZw7NnFhcUc1+w0xF+m0o6srEwi4NBghP02PlziOK+fIrEHOUNPn+r2ds2FhJkrsmNn0SxkY8HlHuJL

XzWrE/s701BMFYexSY6JxKvVlFtRg61pZnay5G7Qxyc2r4V+xREgJ1Gml3QYEhJ2fL2mJnQfJze/ZQog

r5pu2XMzRALLl5r7G8rQ+Qdht8NZhtNIbLlHeFmo/E
dU02iwmtIaM7Z4F3sN5s/hxy3l8V0WT16kpanCQYIks3mzfiU6PnVyHvzpsnilCOTnYTE3Huj0jZbsk6

mAj8mr0+IL+D3W1tirgS5/EJYFK7bxS/qO9qsuR+obQ6Lcj2ZIm+p9oGRnZQPeSHy4B+aUaL7RhkyBQ0

Zu1+eYTowtYCRIwPhloKEYPXKSKZ4IvOCUqO6MJWXb
h8KvIh1DYJ5sFe7PfbJQ4S2bb/d+Df8o9iATBQjUQYruToerGC4+47U7ZycTFWiJtJqhzYpRrHdgq/bc

myahSQGRUZ/0XtrB5mcH6M2kfS6AAi9K73Ji1t3OOI6HLE8UGuRP1+kFy4OmF6Lc3OAX7E2rugFY3kOx

v4d1NGrYORMrBJ8iuDkjmA/lUWppl0vQel2m0kTqW8
jHV8FfKkyPGGSWzEAAqcuoLN7m6l5wwxnFBcOBet4UINEG7W3MSsgTxCCb2OdLErGiK4R8DBw4nyxUru

69oQgmmfK2O0x5tnWr9ciA95HseyEE5wvDZZUPg5wjZXjdHlnHGB7wUqCVKvm/810xWuSJtZXULX9EaE

+c63mEFBpi+5v4cSkwcwIGYQcfhR6+t/lkpVabEP5r
/i7Wl9K4+W++AfrkKu1gozRbUyHdn0SNK9UG/KZPDldSKbw4D7iOY4NMk4qDR1Bsrdu3iFtfQe7KHOw7

B+yyDFFbBWt/3mQ2MB0e75iq2ajrKKwdtm5fUVR6pU+KSH61g/4WiWy+mBIwnTCoBIzB0AlSj1PWxUkc

lKBqD2u8rpJ9xf9+ztn/t9jRANZnqxDLkH2Xmj645i
FTg9GjsQzXhLmg4+xYkYjAJ+NGMc1rqAJ5bu6hpZoVC39iYeDm4zSTj4CVhb4KCoW3HvMsU8jdbtKsCk

QDVeZ2G1jvo+DilmeshAcL9OpkvSTXNl99IyQ2wIZDfVVqtfSmSL0PsdoMBjwV6C+xw9P/htnLg6CI3v

F+qZ/gSN6LsMWuK8+anEvVndzbnwvGNmXZce+GKdGA
eM5buCD46TibswGAHNTqZ0HS5KZXz2UTfitweNgWhpfzPfINNaHoFQ6ZpEkCiZDtZeKZLW22Z3JCs/kv

pOpZW1WI/2DwUhqB4t+GBzflZtWT61jzcdNptA6DB8N8d1eIK7//AhdC4fOK4fmSqWf/YWlON9bt/fpu

S3h+Z1IOdesdfvxHDaVMygCKtGHoXt3CTA+tTbfk3w
Wu6IdigoMmG+ErQx7TpJUQRFVNYzWIe/TdQrzoy0Pq0L7lNFULp3NXst0WhVBgdiJL98gM6XzI2DLL9K

xJ+kgMe+9WiA1UkabQb0PGhC0qOiU4rZ8OvkIzPVpzgw02GlfmV9/Bv/iUvZMRV4UEhyyryFqF5bLehE

JmzvxnLJgmu4fkKO3Aza+EJsR+Au7lzRY1G842QnrQ
dLAvtHacyEilIA8ysHpDhbqnLjHe1OCaNnfsqE2epY8O2P3JQu82n0Bh/8s85UPRnpuRpAr6MQfI6nHT

MaNqi9mM8dwz01hhMA7PZlvkAzrpsRmX/LgFnJJJ+BlR1dOX0GbcRDlV1WxUwctAj7t0tg6zCagDbsfc

psxHXLMKE+htqTXRcv8xiCrEFuyzW8RjyesHWkOI88
zyfhUwlludXE+iGUGWhHZql8mJI63s1x2iOiLlGJCQLITy+UmvomdlNKO45wlvIizIklYDQCFs5c9BW4

dDfcHtRV1ujGz+Zw9tAG8klSYNMzwEUv+pRiEo66iHkVuuhsngtOk/dejZ61rUZkRdkYZkV4MYuJJiK3

jqBK1s5r2Nb06ncuopM9hP/yWF1QEmJrvYIHnL6f5f
80gpbCltEO6yQre9R5KkuQZdCZtmDeH8m4hP5KxYrEc89FjI9WhHhoQZu0V3Ib+7cce/l06VzYSNjrh5

tbGr145suQx7pVhS4i5WEnd1iO7RVQ+8AJiqf85aiQxd1JjtToqdYfOGoWVHtBf8/a2knXTIjelrmTq7

trZcaL6TgxJf39qmSyc15efSbqnWrZ8olYWuPS3gUx
Esou1Hvb7WrejM9w1+DiOQHrNcP7zM/VcO4QIcZzLwj9lYx4N8TvRvMLF+p2P6hsX7Xrn2Z7hXmFYAAf

vcVCJ3p4mglk10u1zNENKF4TYaZeFyLx/quBJn+liVWst25yL218ElXZ2hsSkdKI+RNf558vr2LAwPoZ

Ur4U1MmeIirbCaMJg6QfQ12cX+nWBfpDgbuQHALDP0
z2PTbcMGPCF8PszmD6FSkL8cmIOEVv8ZvhJIsSbhp9pxrBTPM12iHqUxhOz3FCYHhGsn4I+E8dv9Cr+J

GVK6USMr2vdX2jkbdE9WOBrD/BsYcBlBrmj07gVVqtLCEfJpobWFbDDpy3ly8ycdrYb4gyjUhiqzJyDu

jNtGOtX9h2gV3X191vmfVOopH44Nbe53Os81qxGQbY
Yoo82pK7KfLSZ4XOfN2oLTrFNXD+eD5W2wIt5Gby0QSnIQ/csSQqLqcw/lNWXoI7wTNgqPU7coqAYn3u

wdm78Hg5S8/pF8viacuHOenWwDM82QfzUtl+HbxDKNyID5qAgp65NukzXmTpj8KrNw8GNqTq0esudLbb

qBKxCcve2b+y9TQL31uB34RVQU32Z8GcEJwi0fb5Rz
UulMC2xyg9sWnR3Q4SjB11P0Rs93OupDS5+fh6BBom1WCvGs7ziiTMTs436XaDiTtgSf/N8XKyRcEWXC

1SBNmLVa1I55l9ob4EdqBkKqOCIZ3DLN16F67xA8retdEKce8A2A+G98l7eZzs3PnsY2HKastUMVq90w

z+1YWQCAYdmZHe8+20Ed+TJINA3A0fR37UeFsKmH7f
ag8AxDCUU/ww7btS7scMtacNcYEloBq3B47wN4ab678Q98itmm5B4+BLh7FSVykU4iRGIWeOsGC3EOs7

/UmyFbeX5yrl4jK9K1ZPG0U2wleSoEJGXjq/5nojqalzu/bqs0Bi8RfUqtaebzT3MqoOISkCvZMk8hs5

R1lcDgGva+6bm3U426ZY6tlDoFhOqx7ivaTiOVLmeb
ljbLdcx9Qm1Ap88dTko8o4YE5yx5c3glw9KXZcYBocLyzE0cuLgDJfdD5zA7Sh12yhweHB4KagYRVF+T

6NzwkauU8N3z1lZCn9/EfakGTrKLwxhJVJaEnRWEJAnhl88GByp2wx+8DfEpnCgIY7pJ9qwAEwIDZyR6

2IIRE8yiBP/Natbk6jL19z6P8viddfeJ6EnaoeErPS
VKfUyem9hHmU9bPA6l8Ad0CzqfNGZBDjmzUNVj1hp33mhTzCMA63wFb6sRRsVwTLqD+9VM6DVuogJsCV

C1oby/HF7oTze9Bms0GZQSsHC3dGOFisppH8lRcrUYa6a8ANmiPE5qu3UhkiNZKQG664DFi04Voh1ubq

2OMEVtcp4STEc/y2TXSxBB3iVg1paJlt2oO55UvPNu
55bfCSLThfoo69GzpEfXQ7zMih7UWyKCl3Q2JFII2pjprCqFEAJ5DLCIjg3i5/QVpnM8eXbdxkRQVZ5T

QEQb9DMQuEu5A93IQbjGIx5AL60B41j72PoavFGixq4/dvOW1HaE9Lh6sNmY4aJLODeGOSiufJtu7s1H

1/wHnNir/58/KJQZ7ryg9jGxPNKh3imW4MQTrH1YoT
2OUAZqj3/pY+5La2xp+3Lf4gQDg2o+3T0+avQMxwqMrLH9yIsc2aUgsbQLsiufmRPgkPFkDKaAEEm9fM

15QltBLIM+lWA81N1DhfeJomHqaJBS7tWTLIA2z3hpebIF83WnvFfOej4hfkIcCBzgIKxaLpUC5uCQaK

QC1GpQdFW1yH35+oI+qCrwoK0RXmUF9T5cbc+wzv8a
J+3R2iTpVH5/iwsVRi9rsPXJ8d3FT2KtYFYK6bfsvpoir5BcykdZQhsWrgfehLsbnjM4x65yvKpNht0x

aq4p2WPRICin9kR1zzONkquvkCCK+RM0kHYf4+Oz6xI3bO40ul1tfcJo1jIGw93vcDTlNisMn7WnYIJ3

U8peO8ugChbsJAQegvVj3V2DbMFPrGoX4iPognPfZm
sTanWLUL/uHTPnDlvXSL38LhsS2XAtou5g7Qh+vqxFpITTiy3GHlcXUXlIYdfWWaUwmRVSvaIE1c3DeH

4ocWsc9CUTNHR4uPMyICiy78TrBYa30vjCg6GhrfAuKd16/+KqLk2wydGaRoebK8Re3nLGf65cmgLQED

LtNEFS+cLy7rN2oAjtnn/XSIt3lsnw+PMeGBy2dqPs
FvkK4RrgwEce2pn1E4/C+fEPAbS4OjJ92i+EdE0Q8pVnusQs7BGDB3dXVDYCDay2xQwMPc896+Q8HfH3

ERcOmT+69cpb/zpKTQ6Cq55omTlc6MY5LH2SnWD7YvPsZ3QjHOQGqXIMwRfY4hG370RVg0OPFIARDnJg

peDy75fkHPM6RjnY/lZGMOsU4yUASFuWZ5KMboic9o
odFi7zpbqXoB+YMhif5I3cSi0I1soi67hJGymx9jVxX0pDS5eIc+QCezoGlq+j2SZ1nUvfP+IVk2Q13A

lmbS87AnIeXFV7HN1+WdcGTiZKmk6RTFjOOHpamOYasc4mAVhtEwVMuscnfLHlvFLwZYVedNU7YtRbxq

MRIy9BCfn4g/pcE2JifdrrE4kdEzgDJg+K6gk3DVwb
nA1LB5lYY1Fmu0XArYVFT1esGc4Ga+aaszeE54xteTvGwWjx7RvdrR88S1MBYuByMoN6VvsVuR4Kl2cZ

ETIQktQQKacal0f9yewkWHE840uGtLDGYTb20ntT1K5Myj55nJgvz7O45AxTD+C+Jw6Y0SdEc0H0qVa9

PXNEUSSIBYYgeuUTqUmjkiZc1TWFqmYapbRvN6BgPm
0/Pex40CfynOimutYNqkSxmKHvnvBGY6W2UMQ3NXlIx+yxW61Fo6AXO4tMEba96Bc9BwBbPshjxqUwXm

3wGxFm8RFBy2C9NzSBH879dbJYo4jyVdvT1nWvg6EVosNePCZacReyt+VIrFaNkgYaEbQANzbGEaPjv6

/wtZT1mbj5H3e6eDt58zhlZvbfsLUJP916C5naV3zY
M2UMLPO4AKfOCRe4IBXm1FpoFwf6/nppsbryVFDuEuwwUM3i/ufex/pPjZTtPSE/12RicRtLYFCf4RSF

cH1bpECh0qRZs0tC6kvcPviZXuN9bC0ZCAqVBDpP4ObzfS6uEfFH8tkslemiHlrgSqUpE9Cab0mJub/i

I393qzqhHqCNkfGKWfvt+CJ7qw1iLBXenwV6I9BZ4h
bVql+VuLDQDCQds8Cjvr85bBWFZb1QA6G1l7LjO/P7OzD3A//TmKJajEKDPaLYwez1A4l+iFHjbccW6r

v8PCSATNnwpUrA/ufLfSj0866xMPt3ZYYCxAw7f31HzwDhjhI7IaCYtiDCOOYMX7w3WK3BABdnMtzT+7

NL5nOzJqTr7PGTxzkmy/Fk0YXVsynpvdZlpabyeY2m
y3QrRuL0XSBiS40sqz4Xc12eDlIR/y0VrtsXG39cs9AxsVEq5GQo+cypKmYAI4UPglX2vydumIClNiko

3NCN/JK78a/zuGlZTuQdpSkhJ3dZFYXafB9Y70dzKDQmfIh2geSG48vbCxnG4Cl36W3ZgmlTQkR6bPu7

njb/8vmI4br1tAl0X83gzWgjMi/NFHxk1TwTxKRawq
DRrmLpKIv7SDtWqzj1f6dX/zKHNb7MS+ADnkOVlkO7rXmk98WWqnWsZZvoTot+uMriJxa6UxWuJ/BBBT

eUqN6MWc44Hj9c+mIvgEsLuSp/heX+mojEORop8bVLeTq9jPBM/puS5tXN+8h3bUeKqK1Srf4X6sHya6

+JbK3C5rIwH1O2dKPVQ1/KB/r/S//f3+QwAypiURfk
2KN9XDpLQs7E/mwOTNAN4Bb2jQ37J4iwYHgXbW6K62bUnnmT/bdeiHrmpZndR1Zc6fkPtE72hyxTacTV

IGDgChmr1Cja+wyQo1tkuTgmy4+h4eGcmGR+D/VKQcvsF910A1E1w9Xfg3ogXIE+iKwRhtqOkCK8OqD1

p9DJLvDuy6A97atWtPoKY//qhRSJHAWU4sIH/4pBUk
ficsgslwGD2U5XVN9kw4+mlyLvBMfH1Nv5f2XDWn52KgGdN2qRvVVicYPmnxrxBOF3h8ENmHTO22KBd8

1ciV21/jhjikcZNEfmHnquhFsbp4BjPNGbGZkBj1bvHqD3YZ3J6UYo8fq2X7xCF++7y8fmbpP+MFuh6Z

7WC1lSwL8H3N4MC2pLJ3JpeCShs7/1yVGDykEfH7RN
aHfY8fhUyVb/n0+iIKOyaYpOIBg486oJ48/EIYWrSC8vMFf0PkuRKi++AlJQ1QB2eK8wE/OZ+9O21eKe

kiSl7sg1wMsc7LEge7vjteFB00HEKm1+2dIS4jRpy6RHs01J12pjW6W3HmVIjo0kCNgi2DxI3n471Hl0

nSYDFYNUcgcLot6kf8hHmvuM4mvyAa2Uzdy04trmNC
PMZddApQf95R3dnEoTBpjyVmDYSAwvVhIn+FaNSSflAwQWM3PJ4Tm9sESnRTsOhEUFswgzheXUuc2qOx

MM3X5L9yUzMZd/3i8mYArAeegIP2WNAQzf8Bz2bM2hjQz3L61SoJmgy5tv9mU/80RN5+MQ7V9DK5OQRA

UYVJLR+ams9+AX0jav4xKEWbQZ6e6gYvnmFt7wGjnh
irH3Jlajv2KAL0jF46eRkxv9SwSJILoGWajHsSu/t/ot3zyxABHneLvbmu3U0s3Sb7TwY14g05YgFjro

WQPn4jybO3MbAPj7ZLcgS3aUJQxpdIf3pZ2XvZNb2KoEhdkb7Vn+ddBXGa/uqZ9ffxyT4NqU0D+nxd61

X3g5eYQW66TBfXyuh6TTIqtc8w8umK2qA90zXH8/cq
RVVTg1wal7iQNczkxihepgTHLrefgiG+IZ15P5/MUPk3yaN3M77NIUxgmuK1FhfRMqi0Vls7Ew738XnS

+W7g0pCVwRbs+Tg5Q531kXAzB4TAgCP6phnKdhCPwDnt6Ddo86LgJoyci5efmq0YKFNSxtgr+X/qEJTC

I5eKif3a6e/50S8eRIJGMVJGdVJSEBTO5JAqcmipcF
5v3Ueu2IIJKdI5P/H2+pIoLIB/Khd+Ihe70C0iGfEBS2AlN1UxpUjNxW2244VrMxbhWrmFeu+oxr/9r4

bDVk6Qyne9IFC5kyvz1IHN8NuIoEUD7Sfr1ey2JDZ3XOzA2dcN88C8VSiv/WhpxJJfuB1A0h2UBX9tc1

EA+c1ZNHhFfb6sCXXBG4CbykdQUq0ZMXX3hB1GfA98
CQGtmVOZAO0WGG2ono4UsH3dXIvQdTHrypXLfU7dvivfC9hBu5sMHVA6m1+oIm6qnxxS+qUR9UJVFrHk

0NjcawfY0AMZff5l+aIgRSA2ASy49TvoQCqglffYkMJH6800iwFC3FxyYEtJrpBMpM/dKF/Plw5QpJHF

Ll8M8qR5AiB7IPwsUgKwzYlDdAgzc986iIglQCf2OB
pff7ka0tEaxA7XYjEh9p8+WuAXxZYCc6Oyi9ITQcc+UxpPwYhLwSQMnnJrWG2A10UePzl88zXDGCTvWJ

ZpRdHnUZSYyyglnD2k4+IsGyJi8lFaxNumaTMdC2swVw6HVDB/VZr5YjIK4LMTXYNh138CnRG2BdEfkQ

7ZUBpW3/kQ1XXCpVMOmZk3i+vGM0DKgJ25y7AMlZkK
Ki9SEntQLmCrF6B347T1CxaEvX9e++hxrwdDUjdPSWfOR4/aRZBduLatoSXgBMvALWOz56QlW77yd4wa

zDk9Yxx7RE5qcomd1qsdc6jH6BtIrytaKjTc1EihP7v9Jy/RWZ7mem+xl34VuVtt/ORTeAXgWeiMPnTH

GCMKGX+pf7bc9KbD5wl7w+cEEhbi2iF+Emxpyz3mue
fnIGMkBujwxs4ye0xB7ky6L+s4cKE1Nu4jVcRjmYWqnwdliqwsVucDCxg/7eZZ+0GPPiw0QOqYabhWo4

czBSTsUIXXIKq8bVckDCRFEwIRuUkuI0DkL8ZUe5FcqukfOl0jvwOQBz2OCUigeTZvIJVTQYEQiFUmIz

xnebgy9g6RfXJ3EtdaCUOKUIb7aOWIUgbY/l/ARcBP
wLqxrlF1FjiuMCIX1eH0sayA744WPaJ539HuVQQ2oRD3/ht5OuLQDAX7kEXZ2vJUDEnA4l6z91V/GBRL

2APrKxRUDfyKHtLoXZD06VeklGhGGYjRdMAn8LxlLr0gP4tI3Kh9PVERPC1/HLqJtxZESsmrGLYhGXvO

lcoCXwhTDqR/DQmF1C5N0zv9Mavgu1zqb0nE9LsGjw
ocQiu2QjojBVKtyXuDOav/7KMlcExlyosLL/JcnxIG2aIYRNgrGXLqCTSl0NTWm0DU8O3xC6y2luVw8z

Oq1jf4PGZ0YsHCIvTEZ+5xFsSnycx6yEEMN6KpSptXsqOS7cWudPtC+haYDyiyVKKuGClvYD6Or4IDwl

FEanvvoQj3UFct1DuMKtG1Y81h4V0/vbq5nzv/jp33
V/vjggRVyRIuRtBawB5OtUcH/SkoGVyWposfWqwWOFpDCbi9hlWKPht7px1RtpI4ozCVwo7E/719dBit

w7Qe96AqYntZ20rNwNcCOwWm7ywvboIPrJ5kl5LjojYmhU5CPwWooY43cc40Y5Va5kxn9zMeRRIbcirr

gYzASqF+44kGZFGqimYXLYceHgaXaol4yaw05Weh4i
zQqW9TrBskh5hf1ok007ggK6Xi+9X7JhdY20qq/nIVAdnHr/ghmlhrbQj/S762IYWp5fWpkrEVHDBjuP

3+DavtErPMEefBY/oBqG/lRxEzoCSdaRTDsufDKpWX+7tiq/O+OVYCRryn4ExcPDiU30uXS6MmQbFJCT

rt2vsAl+RSoFrvQe/prAiiCBiLdhhW4CXJ/xbGAQYq
+Qc+UxF4Sr4YoYBr1BrB3BYIelM/06SAmMbrfquolgpv9HgMLJfI7sIUI5Lrd89a0s3/uTd9cACObAei

hbY+62kEpPe5kepZ1dDarzQGE6+m+o3qlM9b+0Br6J5xVHYezwe84sjiPTO4Q5iKZxdZ37j0ZvBXrU34

Om3aKxvuaRWJdASIOwi/LaZDeQdJ8dFtGyw3fa33GZ
t/PdVQbS26Fcue3wxmJp7e0CVui7BtD88zMlklTnh4a5Sou6BsuHPZ8hP8NWId16c+Kxodifvo1BI9zH

y6nl0C49J52glnwV6NwkfbB8ewshPp+pVOr90hiMm6F5V45IztJChfQdJTouZdccqopLxseEqCMR0qkr

YIL9Ow/1mm31d/bJ4NomNH+pcJDIz5i74KZoo/Omr5
aibqMFGzIpieAHoaGjaMpDdCXwCp989yMxgo0cDTyPjb2RnoBSGeunBijg8Wq1+J1HJT75mMI84z8fKn

uAlV2QYazqCyGpa4y6uNZI2xuOzeY6u5EGF6ZW2crQkVwvU35MQ2RIkAc5tZ80pilGs7fTnaMScovj56

9hRdTP5F85Pqf+pa08Wx2DbSnKcMwsZfBj4OUzFkg2
6VvCATYyMA9+1tMJvvHUzVsuLhIaHvQzKjyQkfkV3acW9hikBrMj+Of4RSK8gFwOk3Z1QsHlT0ytKDMU

4XWOk6dr+AHjv7GAmZ6if7YZw9pJNB6G/D5MK9tDlfHkgcSuTDYaDn/FTOfVWhLGNmkDfsne4bLVn+gd

inP8L4BO86mYeKg8jEPJfpJtxBsSRrGWpY5gULI5Iw
d9ZM9Q16i4mm3as0WPo65WE0OBSwrFrupvmSHe3iOUp1vP83322aZbWNBzx2NRANkb50sBImgBORVCaw

9UhsRtTFjfbbJydJLVoXX/A/Wp8WyyBvjVzin3Wpzb4UmPb4iw1x92cc131guVHK8/FiGDjJPrmVFyvG

kzl/3X6CZL5N895AhhApP8SGwPb1NDOJ5ui2cNafnz
aVu4upHv6i815o9qBJ14rjgwzFgsUHDtcgcNRRcepNpK/RX5yXfeWNOp2Jrn+zASwtDXzhahLdbDLhC6

nWeiIUJl8Bb3FS+db8fpj1BCLyHS1Sck6qFkwHOf+CfuPssedNByoEqFaAcr9t3CqjnTogXvRgRsDc1+

YMld4mGiO9Uty7fmEeH6MXHKRWeiSax2qsz648CbHU
O0jVp8ZQ76xhNlmh8vLgjLWX1NQTOn2EnthrYxrQ1+MwmFDesB38Gs+0pqD4dCiBt0OSPUcQnVSYsDN2

YueJl5019o+h4Apwst0VTB8z1C9x+Jr8A0r6H21JSTNzIUVdzWPolVHrPb3DQyZi8S19rohIFjDszRjQ

Q0k5/nGl63fcCUHNRvnxaD8LudosyzsBServEB6TWQ
mHvW9ysU7Pgs5kmuwOe3UnVWUTN1eMRgS+C3i/fqZ89Yb5XKxIqCpCjU2/eYOoq49Z01HYIzZSi3DuXo

hWrUXj0dfumYl1aLofoLDw2c6hNaelV3R5bBXZEG1jFN//+ZFr45373uR+Ns2CR9LnHsxxo+hb1/z4Th

AcIMC4NzQtoktVCXWZp2FT0yQ9BuGGGC41S9T53UlK
QwyBwN04/eVsx8HoE1qpp6adHIaRP30GlOlktm7xzXtUhx8vTyWNw7hxOoybDJnz8zFQe7gwWh5rtPr8

fonEjWbx4Jf1xUl+PNuox4+CkSzVmrbSJ3AvgfLce71yucaut1l/xjxOQSUFf7LlQzj4WZ9WdwSIrWFk

qJW7w61Umw7B9tdALFdXv08BMeqD0+K+ZZvvCCHZBv
q0G2AFjOtxHQtB8sHWl328wMcD6yoBOcuY/c0917bSdB1edB3KHmy5VQkCGYvovFnvfSOAvy0wyQKPpF

0opn6V0jM3bDtCs5bpABqON90AUR1KiyhrjZzM5eGlB2nm8VKWyiJWlLFTw/sDpsxGjHiCK22eQmBMyn

Fnd3PXOQ/bDl8IXqSF0lJw7u3XG1L6c903ZfAJYxN5
TeTMR/u1Z4QjGN57oULQO7ldC7gVg2eV/VZsb9hovSe+HuCJ5IEjhMKpwlu/NR7ZesKVTB1m5wFV53Le

0lWX8b/9koJ/ASYi7VF44OyKL+toxQ7tltvQLy7I0wikefLwuCXjThVjubrW/BPk0HtU1hi3rhh4z0xh

o2iKa+yoCSCx1VxJ9xz35+rhfOfneedYj/fpOk5NhJ
5GF1gA8CuBe0t/2AUT87o5217MzMODHRmOHjHho3WfoQ4mPvJs6Lmwlu2Cck1tszukKkOMx0tbdIYX1y

1FfDaWoi+snr8KS6OD86zSgXSXocOAPUfBxdDEONVKWbWwP2MTIypB2A5ql9hGUukdGw8dfQ7BHwnU97

my1g+yz126W22xkVqxeRm/Yq9Gc/EpcWM/8UM2maK/
kWQaqMm/Bq426Y3VkbbnpRp2g6jWk/lC0K2GczkDTej6z0EEftJEWOi9cnW/wwrj0VYHbWf476qPlvuR

m0del50lVDnLDOi/T4LTAKOmCgI1BU2IL8hHFNFpyEkw1ZkBdZtfJXw8PndzFV9ydko8FXmAPyL7v4Zu

r7sCCps15EEdqQat20H6lS0QeBUat+iJX1uWl9MG3d
KURDQa3sCXj1dsD2f7NR65wby6SvGxzqlikb7C4lynsHp5P+77T/WSOOwmYKd/ypj5X6NlHigA9zzjjN

bSzVsYyfGDkV441pqTe9of/0OzR/d7QFkB1/hHZXMFv5Dzh9i+GO1jYInDPNMqK0QOwTIWWIZVenvuPt

7244mm5ozC0sCCQCsfxZyyQzttmCymnDeEHAqkd/2R
50kwoYnOv61l8/2imiw17yZSsHkfbjlvsqzttXqqaF2FvY0Ju4vONIbUES0+PntgJlVSmxElpHWvfy/j

L5/F6l+qj+I7/DcM9UhXcA6LE3mc/jBndy37TQUs7/+i/P/ITASDDBkq8rVIODdqE8nFEAvTdVFCPSrz

j5ybrrtB3nzDbyrKZxeFPUmL+Q/Dr6tIDE9QIwx5Ls
Syc2cJrExCJY/8BsDtVb77EAj3kOhMHEZVX+K+iHrf/rdn5IGR37kWIx+CWtMZsaHPeM4XigAi2ri5Q4

jxzXa0r9LUnd5uavUzqkOV92Qo+2WL34hft2+ZELuqP0bH7L2a50t7zkUffb8NSVPaZSPfTXbha5wt7I

KfwdWgcvtHASowhz/CMAnYh3F3VXuubDoi6mxOVAHf
naZtbCrSdLEDopaeiInMZZm+kIwEw7IHmqkh63cWknP15zwPpiOPF2hFhfAaYp9RTW+1naBUbvqRJ1gm

yI4Wf7wF/+sCP/DeeBdJtunm0aqUyg4HyELM+RBbfxjqdc7qTpGrnhmqKjtgZT1K8kRWMRC+91dhKOda

lTTERcnpE5JITwBhaUjH3X+i8jsxrOGFbwOI86icZJ
SPXLTr5XylpIfiRLkgf2UnfS0gZXM4bY1a96oTEgmVrJ3G3IGjO3JE0tb7RITfGVMypzhBfzFpVYG7ns

LYtKiIiae87RbPL3qGF9w2QiKr/KPwCRrFUgWeMjemAfy9WFf06NS9beT+RnT9UY5h5KK/DuJH0hPPVm

DGaGbZOecju+GPRe9F02MT4382twMRzgLTM5IzvGSv
pGLwv92LR+Wn4e47M+otQf2AJSA8FrM9oTknzjnsm9rLHBLLtwDS6FiLrAMpFqSUvm53OG47sFq1HCYb

hzzwPgTa9mMXLA6/v94E/eq7QAIODC9ND6Bn4W1PAxlxsVlimemfV7iRk2tdb/zU4GOogNPdJB8+2IvZ

UAdueuDj5vxTdXYheV/zQiQmGsetviI4PEs8AEyoF2
cdFnNNFPQaaKZRfAQAkIZ3fJXTaVsdBMySjzKiNmLUsafgik1N3E5n5sWIXyIpTu5zrRKna5WsMh/HNL

T7y/CP51B/rxUeafFr+a+/pDpf6eqFDfWo9ESRAbPFO1Nlw2U5+kVgaNXRs5DpbqDscYz0j4t8ndRllo

u/++PUeFk8PMFrlllajqRsMEg/mlFYDg1PS0S0z+/E
kf59zeIfpD3OmHeRiKZKwZpMKmDGKBvWv9W5epeJ9mKoPIX1AO0IQgw4dX3ADrhTdDGzD9txgTfgbiVW

OSHVypurGvdn6B/TgetQIBM7Yan6VEzXFThtDcnsoCcQGxj8GhGx/s82h2+3v9E3/ixwwRai0JbihuZQ

0RIHVC3fwYwnvfKuCURJF6VDU2uZ4/SyjN5RUhQMWQ
Z3MXu6diS8wzkj+J8oB01+6y1AVvVwk6WuT9cqH7pWjDCwEXfl5rLAa6v8/X6Re1mbNcHEJZ0Y+k+w3R

8pCgb7kmrhG4XQ6ctW+yJbyuaMgCTpWLXH4joUNQOLUrFjlRewF9cEzDuuBdBtcNuX/FINwqlvMMmzbA

uBvzzv+KritQGmc/0iRZk7+445Nef/428fOki+Rec9
mzye7LBxBo7wqYeOnfhBcCszqUWdPSGwnqWVyy/okLOUkyz8+TM2W5bqf6pnp8QZabFLPYkGbVUVkEWs

JzOLxaNzit5eqLQ/qDn1o2XAyJkb0iMsv23iTiV9CCte8UxyyuqEdbyT5T0duHJ0hvXTTWSzaVvM8MRo

zJdG7Zut7zKnxHkxvz0FE201Rono3EoGma089H737w
/vytmOAcCkBx3nax19JKgWqqr2E8KsfbR3xMrzAmfGxw09SSyDU8NwxzzEsQgNkqxZR99JjlvcSb+sKw

6eR8kzYJ/n2sk6vaXn10bc9bagRuZj/ea/nfPXc+FXHXO1euLitxGRrcFt5vlS6FJjhR5l2wIzF/rn8N

OxclJBO8CHKkNOXDLx23ot5xwC1cL5UWv4b3botu+K
Yj8ki3Y6dpxs92/bXJOwJKoJOTeY6BqPLIs0OpoU+mfhZa9mJ5Comu1WmasmrjbVRmeDKod38ad8rlyc

xfAdDrh5SvUexynyS8+pR/+92uZ/el5/Low+6v9XDwLsbC2GNEzkW3zdKxkxU2mrTvt/VrSMxArBHsQj

iRaCHYJtZ+MsQidHtn1s5sQpnnheE5JgpM1xiclNmU
cnRh+ii9BUYLFgSLhwYuMTg8d+Ugh+uLhpuIoDjBV2lOwgFMCUBHkUPPTtRh6IKIs5Omuiip4O2IYIRz

KeaRk/DgnATk8XSpdyhEFnzi5DxeE+QlrSi//YUvuvSRggL8tsg+zRtQqvwO+rl5UGmCEm0/rqQumru3

9s0r1Pj/f8ecTTUYY+TooLJi1T8y+XZ3tWMxkYU5QD
BW8gA+0+B7/D+NkQiMW07Edm/2EEBJXy4iBoGl6eEvFmUdUzSFyiiAamveozUzv/jIchCmEkSggsXEdS

8EpZh+Kf2jxXbczxlmPIzpyVaV6Bq6sA6ZmSo4Cs55frmWcB7UPI/IqI02CXLfr+PXrbnmWWSYYFFrwX

uXWOG/lOGs8NU4Qq5pHHqax0AeDZOULzrvswxGWKt3
dEb7pAnpi8pWvLLC1YP6oreLjqG4KrNgjOcJ3OCpNXfAR3RVgbgNYijqmMYQb9UwDRksgMr6q08M178v

KdvBuZyWcV9T3yqYZEPMuC/7eoC73kxa7tctxEeJKEErHF7Xm+8DWjDkFcinmu4VRk0X/ESAh9IQj0GW

26OTylSiZps0aUWSW+19UIQvl5HO5uyxSlpwCpS1sH
+A4VCneQXm8X0Ni0N140cPp52Mqg9crcb8xPwxhWJ2hxV8zICL4WfQ/RaS9l7/G1/NjkKDSq//YhuO4I

WTaZ52g5QNItWG3mM1+tAwGkYVRrKIG4i98nom9gJctK+ozCotBYmQwX4KaWAJnYDpqZNV6wmNYuisJV

hlcDuGhDBSKZDlRxNwzPrVZpaZwI59XMtfXZx7zopz
7wPS37C+4S2J3BwkV2t3eB7CMT2yC4qsoJyVlkSAffY4jXeOhDrjbUaz33kLQSARwm3qi5IXuShWnbDb

Y9CKj8BxZqLcpRbJryL0sW+t9eFztkkxAxASry3lAOTmbXnBXQeiI1ZfZe6eATdex3I8FxQmD/D/+TGo

wB6RveLJVieRH8BPX0pARdN7Z2d13BCFO64iEqAnYv
jk5lrEC/bPiryRKdQZIRyi4UrrsxKoJYCojO3EkW496RWOtfK3iBmODjKgB0/IYk9yE4QAOx5fzPGJB8

v1BfA0uCgRMw9wAJazq7ElRTdrUjxk9/UOO1RQHzAObCeS80Ic33zWSgmNmzRWC2aFheQ8VNzbwO+2Wu

JLi+z6tYmNW0CwfznJVT4Irr2E1v2P62kOwo3cSqwz
yJ90VrjlLFw8/oqNpySzicliEzUAr9FWqMp1BbBrEZDBK08+a2b5WsR6cOyMBCgH4BhkaQ8X4xxRXFNm

4+sRYHZZnasjxgUOw75sSE+hdY8C2KtfprJpO0bbqwJItkP662OvIrskvdlod67CrljKxzaB/TUr33ei

/yN6gFbx8Xrnq0Cbunx/+03bi1HG4jr9wcnGpl/Sa3
UnxwKGXPg59XVcli4LMBymwA8B6PZSYcCXUrH7XTHf53Mxyc9qWA26B9THBnA3SK0LG5GwlI7op4rC50

QVpZKTmUT0/Znr9rngND90T4f/qCDpkvkhLWtxpKs01na9oBdqBf0dpDz6qpRUl/bDAAFTPnNLD/LoQe

mK345olttakkmYE243PtQCExaGjJHFdnR10MWLPaOL
HxCtp9M03olLfQFilna/c4YWSyKnMCiaVV2EeFc0R+9Z9yRihDgEuzauvbS2k+amZOl1XC3D0EvzvqoY

J5lUPI0laGWOYXCSb8uStkszkW2PNTyuAsPz9IeKz1ktG49NGYcrX7e8ceCdnd3RZH4nUjt/e8HK+AnC

2E1Elzvct5P/BONDS/HeX6HJab49x/jt40OPDAcy9A6gI
BrJIWpV70FC0O22UmOn3g4KPVkNhFCNIxb6PIW9dFKncbRhev4wal3bXFVEN1Wqr5zQkoaAc/UAfSB3L

58qiEmvQL4ANYBhOpQsAEsE97LhYuze8+n3MYtEj/z8St+lgN56r2z9hkFCbdPz75fefntlq21CXbtdN

iwipTF4OaHci1r3JirbeYabHScRx1q+aqjNWDIHKXF
sS1T5RKLRjfY1WgHlKLcynnAsuxTgwYHsdZVPGdCNXopBG6tldqv3L2qmc7qymsE/gd70QEbND4ngNoN

Xmqy7yBbpTifIqvosLWIYYfZ1I9VOBO8atDaFMIzgAAmgaUoSnKilbXWf23SkpwZGWyCcrea/cSeVinX

95sZBtWmuaVBi6/Ulo40VKSxx/pFGUadEp1rZPOb1i
Yx8GuGV9RX/dB2ENzaa+wdSAS1Ny2xhGmpO96iQzJ3jSzvI4euIOdizjS21UxnQRm3ei5DDQdNOt6yZ6

j3ZryDe6U0N9VeKyt8uN32lGxJAA+qrpCT5NC4YsvmCsY0nPZexIAH9zi9oJw+ydm9j+7yQygenrL30o

Mo4sf2x6TvlVCziP2SA72eFIihYuGD/e2GqSqFRPB5
Ul/CqP//Um3e/3WsA0uB9OVpSFzYFre8JlY99eO2f1lYP5iwYlO+5PttFI5uMk8tOPSOcvA6pc56aQcV

gMD5lUMjosG2Lq0vBAHZUAIIe5oOj9gG5RHjfx5MVLwcJ+icj7Ph3DkS71ACTv4N5r9DeIbDRndpNbDh

AeF407qScAbBxx1Q/zH/pWQbln5S8b2vCB/anEH2q2
6h7k9rUrWzVS3VcvEme9ZpOEy90MzehNDm5nE2xA0Bq21zsD2NbvlifNGu+fCUl1xKI6hpsqIwLUTXKs

CrWCkn4NZOU2jq8TakihWrjDBPiwwFNyolPWlgOum9lu4n0VpQqx5mdBoWhzuiRYgK38M3O2Uelqxzqb

wDcjH7M84vI2FGJJ/FCffcAmOr7rDLf+pMnN/vz6jR
jUH2fDoZ3DpgLFEemS8U16h7mP0cDn2yVQbiM22euOKSeV4cgon3lpiuB9h+vhNtWa7OfVmJx70Qz8G4

fsnSK98GPtQAoTq3ia6nxDUxmkOdxxIGJH/5xV4f8X7qG2XNA59OB0VYSrLAE6zUSNRbpHpUpMJO8Xn+

+lCX6YyaI1weDQJGpL8znqXWK+W5/M9eOe0jl3Hg3M
MILLS+xcxXVdh+Qne7sNmAaEWLQH1JFs/iQRXhXGmawbmqb8fXyOXyi5uWGbZTt7v6+q617LWSoVK/KCQ+m

lYKvs0gNyIkQ7SLwHvSRtLA3L9i8BedtqGmiYyo34rTy+4PyEpimj5NrwTEyfpQWIs2gnJnBayxgtb8V

PXdt5/fr6y9gudLFZNF68C18MUSNAxeja9w+r3/nCu
a9nytJ2ci4zKrM5Bj/aYMEzWFzhaWMlAkqaat1whA6tC9dw/xC0wqQ4l5UiaIRqdJzHH5pJB82ucBHaR

sll/64Y0SglR0WA110bg5G/re6A71qqDRw75WXeYGXKgS3TjHI3wYqZc0LY0PWOQL4xPK6GjhPfvoekX

yN80gQoi0hW+N2xS97zeC/zv7hj/H+L5ey4spCNzSS
5kirPOQO44DncObOvS/Nc//D46f1o3fkZaLR6oAyusZ7zx+z8eGS9U0cbVd/0ZgRiTV0WM7AC+MG7CNc

vgnpOA9W7Ppd92+++9+6tr3X1Q58LnmiibgxfAdAkyXKvn01XzQa73DwZsaYvXzi8OV5XbsszsY31T/l

1OTF8gll4DP/+st+ExXwIJ86vCHqIpmHgzLZxGO5kh
+aiggaSuXMwpFPs/2HurgKi+jA94NYQXKipNwfJtDxRCPdKYcBCJ5dPNltWSPqlclM4nbN2spg0EGNLh

//1bn61brv9490p2cpswHfeP5/a30088+h8kDPc6zpDkfnBKcoAbG5cWbTIy7KbUWTe0oHKnfyNkF9KM

2S8t7KoXEaRHstWajjUsgRa4ZJExa4uQXSChSKlo6B
CpyFU3B8zb6Lytnuc+xfD3+JEbABF6QAvoGI7XGvVCPmfDHu5w/QhO+OI+JVTgle9P6G6ElEwVgwjPgQ

Grj9hML4QYIyYgdx0dspYMIGEVoKD8FPhiJdLm6a08g1cy4LEeKAmVGv4H11OR3/poVKWzpzsXryh+Yv

x0RVGtxACgVlneXUKgl3PjlvkpC9VPXoeriFEPjPPH
CiLSJKuPT1mm6m94xN4N9Se08DDf/K3ssOz34G96CP8/q54vEdK15wcMLIcEZ/cxZ0BGViffKbkjwBbU

jaFtNP/mXIfS7YYuGgLYZUqPsEX3a7O2dxvJ3Jerua+KmV5Nsq9XzHT99FYr4l/MDl9iCz/744cFW26d

3yOcqNlUXOiYg7UuVuHo0OLnLh3eNL3KMewvM739og
yWs4PFDqyS61hvVmS/+j/fPXi4rGuYXbKg177LziyxFqofsm/xg34AaUWl4OEqVxP6u9hWCouSBNrmrc

IMOfqQfat8KgnenS3lSpOEZ9PU1HVXto5SM5QgQzWvcsvp+aRMZtOgorNzdnjMfQnNk83FS/XSh6C0Zy

XHYhPCtHGE/ife6VFKh93H7z942Vj76BHQfHMsSV7g
pCmnBzOslk7x6jJzhFFgTPuUoMVhGuutJPC30dMogPU8YF/M1xz9x1mPVEmi5uLrzNF6FUFgPAc7ka09

oiXiTufIMSpjjttpAMQgTaYriSol5NLgee4gssq8xXqG5hom2WZ47UvhpHQXlHC0P3xUbI+KfsgesJif

89sx6SPFd2gI5R1SSNvF+h/kRpbHBnOzl7QxhkwePB
ySdkbugrXlg9/VaieZhSE8pvZ0mdvrYb5UVtpk2/librpGZXmslvxI2K4owmBWxsvqQgnsae2UYeuVwb

TpPZeT2Obb1n75B1tzB+g5+pDN4o8sTFREyaA4zXJrYeWztVF05u3sUSeZuJLHqYm3h2ict+LXDZPIX4

2QhVF6PyVBhTbjRbczAxxXGBVS/N4dbJKaRJmcZP8N
wacp55Cr4UX3i3C78VmY42n0M7UIaaKO4N6MU5oVO2i4Dz8ikwRg38V4F1aQcKmc7WrwAQWzmQw4P0Uz

/GD17rQzcuLxAD/SEF7NicFjrAzRHWXLqdnhHorsGTUTrhw1HfLkJkIJoz7OK9gFB+D855OJM3sU5tk+

go4mypmzhSGME6HgDTLwGC9Avo8cGfJQdCPiQpZw52
GdUXBYILkDQM4IIH1o3TohlC9h4m8WpG2RSyyRHJXp3xs5YNt3xqdQ/dJsfXPtIeHli53mTjpmytGzBq

XZllIfOodbRuOn/8qnr7nMkvPCh5XQrstlNsu3S7nSCsBzZCG6WVrsxu8TkMU4aEKjA8wqLu/1IAx8oF

sxrErEjl7H8812zg0J4nUTtbomEbpoyuaI03WYhSX0
gZ+/tgyh8NTEq2vWdRycChguFaxkT3z6MePmZJbUkwUH5RdJJ9PE+DVYx9th2z1WdR8zY0HlI7WBqyIf

UqvmzqxnsFutHH0qRhCsaMEI15jxPqIWi4tb+n71MEyBkr5Cqwfc72WfRLpsnsuQjtbUB4uA5ZF1ral1

ji0HGz3YOgxKPgoKyhfSaRACZ41HE5vL8557LoDvue
a8wpGPtq1pZ/XOoDX84lWJDyin0dgB18NNfEox+WghsBd0XZZ428vNgwImYmgFMc6spdLMHnvZKl8sOM

m6Ye7aJWqBCDJ2+KJ9qECsC95AtngPukaVTKTsQBeOlLvuQiRQbKhkUg8Lc78bSQ+iWNBOCbom/Y

/3vNcuW57H18B8GrYXCW/xjVp5SGpq1TXeWVuJowER
6k2jyqH7JfnA3V1Fbl0ybK9nSvkBCoP8Mkd7Xf6aVTD7Jrfe9uYnE4cS5ARPkhaxKp7aWR2ehG4zQfib

EuVvWkafU8ftCPBc4ld9hAMM/TyyAtsHhcew6baHAQxTN2lDaswxcoL6Xf2dlo54g6yXS/1JyxdPXI5I

5s1EXU6OhFlZNgbrJnGC92ew/btJs300T/JAOIdy5h
XYe0U0Ip811/cWNv2e1szEQqsvO4guHfWWFRVXyc9XpFQ1+UKkljPnWnI/4E8iCYbbtGXh0hhG5GuWdW

uFqlq5zKpHg8bqL9cJdK43N5XqbfaiO9Y3icUbyk9y0y89CB6lC0mXazk4hvTHlKVnKIXkBCS4hUoc/n

y4QQXp35PqnkBxzgNrv5QhId3H05mtkcKQC15nwl6W
By59Lr7Rj0ZhiSw67sbhNC7eXe2c70WeCL0T7hZT6Xq6EHOsj036UGCi/xv7lP/vx9++rfoaAz+zRagI

emzyHR8nLCDx99Al138ErPoZz6iEOuskojVGJF9gqmRf/s6TnYfXPpGrdcAKGJFyzvzsFeMAf0UPMKcr

mjeA5kq/ysR0oihJt+v4IZlPMFOjG0ygLVqKwg+p+V
47tCM8hTYRgKBT6XFy2qv30XRmqL7DpZ0UUOWyqIA3eTOdfO4wlqNj4JuKgyiAHAv1h5o3LPC8t5cnGi

GjuaPjYm0SlpEY4oQXv5vokrOBP07NOz1kEILwlwh/tgffKtuGpPOi1G2EUgCjo9Rw889x6V367OTZCk

Lk615tRI8hMAkQ2RFgCS1G21EXgEbFru6cELDEFsuk
bg2IpzL3XHYPZS1/iOkkVwLWXblc3xBBVJYaQ4idqxN/LKSTmlZ+xhmq/v0cqVo1dQvIJkdrHjrk7Vv5

0OMAVnWXQhiyMTCILTwArnx3x0IWeMLfkzRsRmlmSSlTJwjBOi16N089ikZQqE/hf6HKwu8PGBb+43Wr

7VFY89VQ/7O+vjZtZHMjCCrspVN9AEplduCOh3jq7q
ClW9e2f5eartGjGJ5ssXLMxAetJ1VfAW7jA9CoRTuMgdz+KAEixavfqbJlpPEh1RUko0lJQwedkCvsCy

y2EfZoi6G8eOQU4xSLPnQqOdJgZCnKxGWiLjR666PL+Vo97J1dvMKlu24/MUCUG7MzCmPPeN+clSbKW7

2Zx5JcqCScavnhh48nIxENlkGd59Ay6n558/8cgiUQ
TWgDhB3itvzvkRcwDoAgsat8SIWNcBWgRvQmxlu/9rNaRAdfkTmZdvjMHvFFeCmSvaPee+O1NDHLdEZd

9n3/kyG20XpZCo+LcKqT+mT0icmSt+1c8S0DomzGiZadZHGen7Hv1eXh7Caztjs1qG2g5ncnfvTsd1zS

elQTZ4S1sfkzLYpXwLHlKbu8dxNGzT6086YYXhCDjL
XYE/c1SkxCajMxqi20SnegO+Y1ve39MbxM8H6WF46yD5eHuJLq7DG4dzMwQuupyB8K30v38psD+jzPHD

yYPrc+OtzaDrXKt6xKUnxHXwRf5ZnHEFHvQiXZLjVmw8l4ZsIJUpl6LMMirsNttsoIThT1cCa8GsFYhl

STNx7Yg7PyNIbZTSd5QaXLeWKhNg3gzBTTy3gLLYeC
OhZS/lAAOk4IdgO0d+HhA+f131espQ1Vn1nRhSiA4cYOCv5Y9uLHi/m0vMNHuj+09LHaYBZ8qiWvX7O1

17HkyRnCbajSMRo63zuLWudyK5XKHSNSCywBB+XZPsVw78ibZsbc9Cwy0/vKd6uKAeBtdQQXSYCNFKF6

Uv46jX9slVFP1Vh+Ok677dw08RgrySqtNb9EWEjmV4
kD6On1fgRC2/rsOVu/grDyr30FFnND2aGqM6Awo0tJ55ngFRYnTrlHHB+D4C23c1R6/j9/AA9GpPSU+I

kqY6ByFQHBndbyCdAqnDy4S8gQwGnsD+AFHWOwU/S1PM590LILoH29qfReWdRUVLIC3I9fPHNrv8Z0k3

QmblIyInSkGEpxH14rXDLLO2BfvXQmJIkAZdeAq0fY
EhnZN/A5vfizsSEeuHYHbY9fm9ShjlFRs45XKgbdQJX09sSUtc3c1+OdjLAmcZO+XKjrpDWD0NRD1BBD

jyOsKGYGwDg0hsEMMm+IdSe3YFRsvzkbscw2f3Nsv5XK8YqMPUOA7llePwGgKbh9avUpNM8SzNZ0ICnW

jk/sMKAdXlfS0UqfuENRtevA2Uu0Ug7s8/Bnhak86P
0Tst3CrLI6jnfmp1yDTvl+C0bGy9yEXhXSvZTWNlXHEaUUkhrOVXt/MCemdkIeOtLVLp2qjbLcHQYwme

V0gaoXbUDwzRXvJkOFny58NhpGciZUOVC9VLFCiD01Mz91m5bj3w4jLvPgiL83ZQUp9r9cEC2gy8pKCv

dlwgssODJt/R6QdxUGXpJcDCwvNzslDy8irQ2aXECe
DbuxXlnHQoupWoxdqokjnc1TxOtC71bH9pSSKvYcXclox6Cq0wleuRysqlIuSy083f8ic1NFfCI3bhDz

/Vn3nTM58TNmGyJvVZ1wZFeINzOgLWbthVl/weqw6P2HLpo5xRNdIFRuYBZszweWjti9MX4RNCyMZd46

d1FI4FgphTEhoCK3X4WQstzt9DTM8+6M5hCx79rOim
T6pg9mNpOwuePJPC36iPF+d1mg4WIgB8rZFE4oZYd4HpIS7basCE6ddlQrfanSAK/xHL/sbvPW2pT62Q

pZvqwtD2TU48J4Pfoj++rRF+VEN9kIJ16Xyx7eMO83SHs+q72Udwt8cArIg1sAZTfdpAzHPdF7L0WSxS

JkzNbHjw5Ud8hIURYKTl4t4XDw4+DVaMJCIypbxCOb
4ATv2m4V0mUSgT9TwQ+NrKS8xtoS68F28+cbgBkxGCEx4uwsdr1eyHsXOMpD8tL23dHuR5TReLrlwhKP

GI050LOEZZWLu/hMY3T66V4iMgwUMSLXUHPkeNWyDfhV/xYMzmbqvdIfDqpVpYEZVL++zN/jwqzh1zbB

gumROYG3rCtMN0xKGLsrhg8vJhRYwdo0tU51PvRvOW
ya4tMwgfgAfpwWOS4hKzNfmPzbUVG4Niy/Ki0FmeYXJvaEYYX8V87Qu1klljOB9uP7cezMuCG5UQvyDE

fp/EbyEBgZPkiCSCxNKE4UVcSOC+JqOA+pdekFY1249htH0kb8p8uTKo9n90WktymDtUIGGHrjjVKM/G

t6+wsIbX/5kVa/hkmQq/KzsZ7EQshXXHG2ddr88Jal
OFCuB5fW7N/+RezNM0QlYDM1aMASsj8JYsCOF+vh6N9eFXAa+GgATBpsFPOt7YrluDTECF6cfGWVK41Z

RCEvnQ2z6wQ0X09JFPVzAMknBYyaAzQI5mPgBW+GOIZuQvUfN7UWwzCXlW/+2KI/xU3NuZiY4e8xXCvg

kLgox2teDerzXMelGeWLB56HJeqEq9aN/clem5tmsW
XTFiZZTvafiMVOgAPvIQJoRo26INEmzOkVqCKynMKrXo0FwXO3fd8r5z7u99DlZf5FEAFX+rLUzIet+7

HueO+LrxOp7NjwVJsQWBvo1gl+ho5m7pTQaBOhzhb91eTjs9zZ+FadoAxXr4mz1KiHPyHVeaBPfDnGWf

TtuTsXQc3HO2f0huOkgm2AQ1ZC719pUNR3sWyISrPE
suwNGZ44993FOHYTuzlcTQ2AuI5UGjGRrhXtxEGsBSqIaixHSM484izMIbeKwTrX+hb3CZfQuRb+DEV8

MfC/kgSt24lTWqZKxKu/Dq6Zjt01lm86rVL3pKCv6wW2bgoN5PuX2j9GkPhljH4fxljKA++p4d8ozK7+

VvX77WPtzesr6nsmEommRgq2Wxl5GTctWU8kbvfDLY
rKiiuTRGkT2DmSvNWf2Wjs+EaZ4nk8kRfhs93AcoRLttJxsDud/NjAPxVd/ul1J4xsmxFoGnahRTilZg

IjPzAAJPkswA3vcqMPaTRhmrWtxUE0nkrdlna5wM+D4PeHFHnJ7J7UxKYjfReHk+2AxI+nvz+SNMwxx5

MHB8BxedQmpPycFcqzunyoA8NjMyqVA7jSYb45l2Hg
os9mHf809ENqMOxurVxjlrTcTv+ohlw8oqZZLHVxBW/rRRdb8wY2GjwXTQRAbHgayULXA7yiy0JYDz6a

pWWlvn2yzyYc9Z02lUUMsSgQ3gcvL/YKFD52CLMjgkrIEhbOwB1TDJWUUfzieJEjnBxninSeb8cugcQV

JTPzftCzhw/0vWG1K7Xb8JRuXy6QoOYReEcmBh4iiX
HR98Mjn5/tvzo0Ic1+M99y5SgCLfnwzPZgcdcN8wV2vHx5spyyja3d52RoOnKpMBf+PuB1DHmuhzzH1W

Jd1rGKxvo0odlW6ckWNcsKp0UYfx0sLgkgyQIVU0TzcRZwMuaboCgENt9uO/wftbpKOvXLZ83SXf1N97

ibEzsV/4rr8/r3RglaRHENmhcvJgf1vFSGgKXKBKtk
TvTde1ReMD9jyfxjxrZuEveYr52KtoA/9cV+gZGqnVfkPKJ+/Yz5q5jLsSUA5hZ2pgBjpkvz5wagl253

gMry5rSAqSuy909MSvABknAYGJkkRGWkQn89yhMgD7sYjSN+Apt0ndYPq8a6ZqgNvLCelQjNjbDYeCK4

ZgNm+/WLm7QaihOGDDgJCdeXnZjbVm9+oCXMasNU70
HodOuDiRqWFGMv8Nqc/BO3lgS4yl/hGmnqMYGpGAL7cbk2P2O6tIq4Dfvx+mLuQ6e+imAHdZI70P7GJD

U1htiWdfArMKMARXDs1jqp2jp1NLmrvI4ZAh8fn0cetR7/vOjYkEuMLv+u00aVdWRCfTbNMdYc0J46Y5

uhpTzZDzR/TyFYRF0S9CsHlU3zWlSU85QXeYVVZm5N
Uh6EKiA4k4Rb8aEy5I4ZK5yMj88dDyyCokOi67xWmxjjHWDc3JzC5wZpWFyfqb7w2x1lYQdlT+zExF8w

LQlVDxf+sZJAqY3pk6K4+lvnb3iZbF3ob1Dy8+TjTnwDuI+WPbqNx9iOS8YoEs1ThS27yjSXhFa9QeTd

w9s5bSB8j4iNEnnUVAAvi6bDi3a+ihI0jB5HdP7hZo
4JZaNuebj3xQ9QxVO3S7Cw6R5LUrNwwTk6oNbDxVelSfFjRHUZ6db6mVVC+rUrtPWECPlGlh4W1+Wh3a

pse41B+KOAgzStNHz0qQbaPb/zTuR6/BwDI8Q5VYChOJeLHWXdBG9lEdPI6waa/0ur+zf2FwRObufau3

5/jAQkXNw3EERIcezizVYP+EES8uN19BDsqRWoOZBh
nl1FXK2izDSM3BfHoj4mjC7xvQ/kh/mcFn+vpeGHyzF1TkixIaGpbdVauINop+g28HANhiEK7he+Fi5l

tL0EmNdw72ZEhRDbdhtYfeQbH/CoApOIKqyyrdQqRLppWADj0qaA86VT7Qzva7+oGYFb072DgPYNta9F

yOPsQZDXsBvRPmyxeiRui2ttgHMlI4NMgNNl93rhqD
Y0zav+EIeo6q5WLLdG/oMLTWRUS1apGINfUIhja3fJ+OhF7A6A2NwV3k7aDhJR/frpVN6uC5xY4J08bc

GvXlE4rMvmJtDAh95dt2VWtj8WJ6mBDkuM4iyU73j3hdx/bknt/S3edwmQ+xaduuoMoYDazz4l5hun9l

Ejom1xic/awUAnGebQoKdhRM/tBZZeywyk/yG3ilaA
UwWsWMOtbDecoLtyfueIB9BDDpKujvITicWqnYo3mFuZ97dbHWAZSnzYET0AJr/1mrlS2ynB8W6bS1jo

IBvPKIW/0e9/CFhn4wwyU1/U8D7Bfcb5IS98+gtMo0pVOXk83JQbtdYTJyzRj95P6rzrVQ+EjaYJWO3y

J5EUQzTHfctkiCxQ6PTTxbDitu6JZGrhSTVr+uSbRx
oqpvNhc6NpqTu8yXFh1csyaXeByKVMxmVFTTLUZL0Zgu/7jCN9em6YKmdTfc6QU292st7fmn45QEfYVW

nIu3IO0xkaqeERc0tB/bs7+Tsn0FgE0RoeVjLengAJeoFpC34vhlnz2b63MxfZdArLFr6EoRaeLmTWFb

CC651dBJAYJtiZF4dwPIdl5Vve4ztd7G5LFtLJ1Cw2
jeU7Tn5xjO7rTzdjCCQB9R98mkNYmr6gpMPs3SJoGXJgWBZZOWIkbehQ3lJBskGHllCQKr0Vw8/yIa8B

KlgkwyzIcYCvI/o8Z06iMEIeQmJbko2qUirPFgOW363DVnSNHDosua8K7666AN2Qekc2wkFyGAiltJeM

F11AYOUJWoPilrI5v6Aj/vaA6DdUKcvkzAiXKfWkPL
rs+u3poOLICH1eLvJFS+/ozzygMIXzLxo58Tj4I3Yw4daD2mGmlZfLABvgEtw7w+t4BEx/vdJi5YvJVN

PGB1VI8Ww2jkYb75n+t6gkwJByBmSjVeSn9VxEhbgsijfHirvVa+n9AXp1x0JLRrnt/7yG/hMFoIGMWb

FHsHG0K6Dm0PyP0DoD5LuV2QEbmE6hZpe54IOqdJvW
UR/MirZz7GpgN7cp22VH7YPOaO96/9fKxTBM7trEkJ3TpORjXzevbRuV04AlPr6wt086EQxC2F+b8Zws

pEhxHo+BWXehaZuZDSgH1cccyWSvL4QLyOHfKRB2KF4/iKUVqhDtl3Ugvpgq+bowaNchsFH9ki/5/iOA

BIKl0RraYID8A1tduvfsK/iIM3BqCsUYupICPklbtR
wbFDsfcrxl21zdgiMQOycRPLrLkcrkGEpqqEVV0hHHgjOcb5UOzeiCKub57ou6x72pzsCULyCLV+FMwC

Mm11x0ctzgDuUrnAOzI5BnmotmX+RYMk22IrFIEVlNUeKCQUhplTHuOJBOIYOb78N3dXs4Fg9nKzna9Y

LtmlC98d//BWm0UrAnvZg7kJ+hz7rAuZQpuPfdU3BW
IQHyIqZHeN3CT6ZcM5HP9AgSZJ/7TN3DtmvLgngHIaQCSFLX/5ir6cHiXHf7FxpkZxZrvt3uGKSk4YvN

5AanH8BcuMFhDlj/NYFncZvQ4F20n8yAtSi2ZpIV6UuU8E2xueraouo+Tb1ykH7fOb9VstHfCElEukYM

M/yr2MvESOjdHismtCVBREj3PVik6oN1tXpLOmru83
rHuYie/vrpamGtOB/cbnTI7utlz3RA8w7j/HEHMO0nflJ/zCRj+2pV8hqdRUjLSVGxxHmBWXSxqpeTJV

7RV44csCqz9t5+zVao3aA0cNgo2kYK9EBCM8xO7dHcsPPqsX9pCmIkp3RcdrKkhCOTthjVKRqCY0a5Ch

dkFdnhHGsS5CfIRahpsWbK/VgKd63BpnBAnn2E4gec
wHoz2mQi7m3cq1qtxD/BMaGRf6LvaLWSFXM5OeAJhNO3fsGSFdWwJ1qyP0E9/wyjBauT8Dhy6nGjYxKW

8Zc/tciqs0rR6lRCtceoxC9ZlfLxsZFTp5/jgKx0zOYcfGE3CIBiPPR7AIATrX2ZKP/Hz/pLY8K/WBUp

rAknfNM2PvKL7ynFnbqukQ3Z8R7dOvScDtJ3SWjL4t
wpo+ndBQHO927C+IphbD4nKQLVclrk8DqS2bog3LIIuiey4UcJeo9P6mviAJ84fGxlMi+Lsoo+ArTrZy

lPT6ld1wrQok0k6uQuKfDYHzbJKI3cfK+cn7t0ro/bBVOPh9N4/qO8v9RzH/eH5eO9XJwX7JBCfTSYOy

UM0jibdwp9a27/vMGJJn//AlKTKXlEExJki1CldvYd
EWXeUtTFpnslNw7BBajDw3RI+2Q1sY6Ponvli77nWlekQKpnxWZl2kVmidyFOPOTjz0s5efDTey3LIC4

rlK0jxEJjVywuquqIUmR3psjSluHrue+bpfhhJJI86W1+hZX+meji8ndXCnEGOrR2iymm0IIpGco9s4W

6dr7wxxmwFU3alg9/uvorbSI0w6K7T+QiozFyEAYF/
rPSIOv04tkvGHEQo7RwbOWFPT9GG+tc6Yk/ZhneE7OQD2KkGt5IeZ0FJf6Q9L52oRYDbfEgypwaj8wSx

uYPdpCK7qUw0nfqWwNQ8XdoYYmiSM/kPaC+09hbJ4ZyAhCEC3PMPuncyoK+Rc8xa48jMpb8YBk3+BDMN

yZTBFWMqNnrOpEWORPH+8mVHK8m2eosHUYHf7tCm8N
+JyWOeS7qDAsfHNYC80xDw2Xbm4BC4IwHGmK3qnxzx9uoUWztk5lQ6Q7jIdaqOF+SGIGuUr+92X1+7g3

W8D4a2SDSV+GmsKCtKkLDIwnDge/eYKFOsdfyHclvdEAXjrEVjUk8wfjkiOod1+N8gD9SCqLlQmjH+Ap

G+QxhqOcYuqs9NLP+yqsMMIcpIG4UAuy1vwWTyoyuT
ht/sDhPkTYVL7Gvct7EJfhQ4tGJMStgLY0R0WMuO0wajBFkIvAuRXRtoxNMhBGxy6CVpVIhiPFbbT5qe

W8D0n+KMm1aO4zs7ByTAZUz4UUjs9+gEMwyC6soMYVs5m901B1k73KdLjPjuJaXkacrM+echV1rIDCGj

snTva3vIY2vnijbgZHGd2K5C1iIzT7v4VuXJCOvpl4
5TAVEGh062kNx/C96LyxtbQfIGxL2/o/IGZzG/aeXhkc/Yeor3oEXjZfkKOsmlucfUqBuDYJflY76MbN

3v1+HbTPlNF9+qIxGRU08GVfp/UFLapx8KfMpEJ6Ga7txSiGtLAtKWYklZFiDPlaXZAvjjLHxZ/1MYBg

ecKU246M3rNRwSSt2fkab5Icbs7yvaFwnQ3BQ84iaT
0S0gRVUU4i7x6IOF82lOcZNnqFlBVsbOoywNag5/Xz7Qw2MP7sAJZyomDcrKshAdlCqKH0P1G45p9nsx

SsA1kuQ55HSRu/Hz0zd3blo9JMWY4M3tJS+1QTXFWaD2iSYwKpTWGFrRPjkLYBDRD0W7dEqfLKrmGkar

SuZH2celvcjG/NNQj6Vg24qSupgHA0N+wA+S0JF2G5
MSxPCBPlNie7Z49BWdTw9EwOSsEP+ftB7PLGmwyVX6JMyNUX940aiLcjkFmz3zFuJc/cW6an//8ePoyt

oAlOlBG2FfLHCDgQqzz1g5mEt6qDwsg+J4kgFR696J5XWHoGs1Xxo0NB7IQ1FTgGhMovwlEyql7JWvYH

lsJzpFcWIbyk9mYG7kOoJ8k29HIDyI3Q+f55llcBnr
cCxY3YCGininAjAsu4CevvNmRxlpA8C9sn/el9J2/FC9H9MqiuIuyfVpLXNoe0+Pg11CZvUTfvWTL6xV

6MLc6Mf2461Rk8vQJGGNDYe0qlCDvCJLcmaW2zkrOe4Vj6iidnwnMqOt19kue+iDd9lauACfjzgOYKSH

ldr3B+jUj3cXl/76HRdQ1lqrtiAbCWFUzSh1/vS79G
BvcAZ/hKgiuu7SwsOIoWtlCDBjJ9yp72kNU3s9wy4kuNSRJsx1KWJdc/1NpXk1fOgBwKdFr0MU7UYIl/

VIbJ/yKBhXcAptYH7HyFLIrsPAvoTOedCz3a8n2Kz/PA1znohwTVs1ccBQdRncYwiX5pZd1gpfPTo/iZ

WxB7qSf2/gJ44q6QyNi/4qYx8BLk4tDUpUqPfP+rSV
S+4htF1ZyH6MF7v9Ujk6tVClbj/NJz+eQaNpJAdXblw9mIuYWi/T0xskVBxKmi0EnpR6ieG1Q+EWckhb

p7F7vzjr1cPti9y2wDhb2NHa3elQhSdO9rxOjTc90dbI2JWlRAEPgrLtFWr9hayYaAnMkFytxl9iJr0c

y4pbEf8tW91qiVRrCPdVqASvziDYi3Uah7sCvBnxFK
AMvb0LOAlLKmVvzIqEzGhfgmaKGVNMyWN64aci24tHbGdS/HptD3JH6J6L8jrExjFoqO6HHu4RgXLFie

N+Uav9MxmynIJsofExYDDyRKwVP2cATq+7Wa0u1PjBR/GQvYzve6/vpZHEH8ecOMUOKZ6Zm5qUgA1VUl

dmvjP7v+ppI9Qq7YNnEfOULqD0JORIkvH2rrElcRy1
CXCG4W71SlGrf6JgeAPeIKjapuBB5SHiFQfB5/d04va/V5ahSA/8gfE3onLy4SwxDr92ShoC2TqBVHvh

RHznpaSWC6xwYw3pjclbGn/wALMRcIaeH13SNA+QSjgAOipqwNq8J+jXo3t39RaDoCEw1Px0PDG3wcT5

c+Mfnmg5LFOqwuiqMr2q+uLcZoy1L2Fk1mhQAEH9yn
klrBFmEeP9LHx9mR8f5GfQPRt6ZDRfXyjnyvM46r7PhxYZkffQFCr3AizyUWEogtgFDtquezn0o5qOhb

U5h0hXLxlvlLXOLnJ9CdjUb0wt2U5k8NlWAaSso3c2JSWQN+vgHzRBw4r5qmURLb5/Ib/0zunacvKa2Z

83bpG+wyU7ObnhC/JHCuz/hKKnJbPY5HG7xvWEoOlz
RTJkXGr8lIwUA51ZwIloQVUkeBzAG/EHqN8j01fNLk8bxzVcyYWn2U6n/p/rhhPIhGzNBUxZ2KiR+S7o

QtV3U08YueIvybXZryX0LUJdKRxImhZfmWJNo5bxoDrg5xSX3aua3v1ObT+60Fw22eewsKm9lBQI+0Ew

SorMvzWSHqb6XDzApzMNEGtlaZ8uBr3W1WJ/YaUk4b
kP7zpKNYBOJBARXgukiP0kGHXaC3NSu2iSLE5It+WYzhqG2e4TQxhfFJVn+q2W2F+H7/vBBXOt3GkY4i

N8QDiPtr8zl6qRMDkqvD4UyIP2aFFwoM7qAMEEJFyrypgg0mU+TFWylu0aGan2IBDqmbgLWCcpZnm5Yu

DrFs/nMgXmqwwrDE0TW0OuvGYeD1xvJxbbT9xPfCck
XbyKGY/OEurTddDpD9gFRESew4EhEpgUlR/ehLUrkjuYIvMjjC+L3IlFvFoUF3ntNZ4kIrFjLu437Tny

jD8rt7THETd9ZwKh4JSg9heBcdVDv1yg/kf2nnmiRFTPJwWC80A+47CbDEWY3R2Q44RUU2ReXJzOTZhn

oTWUC2Fzaeq8ouuINAVKPg4RcytO59gqkb8dUFNEIB
vWQScQeDVGdZVZbEluWnkXKxfI0R4yMJolUMtimW3nsp77OfUklQm6Wxye3RZ9BwIBq2aQznZOYtWJHf

nEyXG1FpqPTeM2XhgCBCqFkO08ktgKcgkuaQVl5XR/MboiAJT2FJKETotUAbtL3rbKHuKvaaXCFTgYT+

+lO+pSYAMNF3TqoCC8jqyMxYkkC/Hu7T9bsEvM/O8Y
o6du4b7VvFXrP3yImAA93kw4xWxJiH5yVqFkF5N/plAsHaFSFJR4AtdQzuoU+P11YPK/h/XKkMA/hgg5

MB2fq6s32ApOrh4ltgSxJAdb6F7zDpiCd8EhyIQYh6mcModHBtE2foE2g0WovP3F7dSiG53haQ5zaDFB

4n0q8htrBHT/T/AK5BoizcfeT7mQv+LAvJ0WFkrV1N
aMYQu+kG9UfcUMAyozsAhNiJl0RBD+m46YPxWD/ow73mzuK7UWCWFy8hDrXe076AJhQEjIVlbWEY+Z4B

3D3JNjVTx8oQVfRCCc7dYXEmjzioLxkbZY9DZd2mJppFwsG06fPiJAIvplAXgdFJG7s44rncPY6oen9/

PtbgzlPkCkq0qzoq+E4cWQexv3VG1HAEjbBFZ57n6p
86XrVpGl9GSfWcVUGA4mz+g9VmySYxIZko5yDU5sHAJ49SIS4c6I1JiXx1mZz32nkG3KGD+9nc0VC/ep

X5/dP6td/Ik/3DDMDWHXUxJObh+JVUIKjdcLdolr78zFuP9ioS2sNKLNWgswGgiOEYsV+17YGupd51/4

c6Hz18xonNcu9z8Yf1/xyEacAW8DY82Oynqdtlbf5K
OXTPqJsPg09Lp6xXn5h32b98KSYIzv4TsGqJvz10UWnDJM6HZHLyF+Nm2OUXwQ860YAGdUeCWi9OTOts

vl+7Hob/20c/Qz39PSuDd3Adw94EEgpF/K7ohC3gaPGFdMcxh1Ajga87x1PIBXytU6Mio5QH5vHtPbyd

nPNTLck6HSBQLBpl4SzVUf/TX80yiYyfKogdZd0n70
IYSWJfG4y09zp6kr7eX7JO4QSQ5ltCnzXUrf6U6cP/Xr3fzLpQxhKoA/iCkl3j2d0xZdrH7FBPctulYU

5gqaySQ8BKpVb+Ph/nlGy43IqCQFkSUWRPwuy4hgO0SVWZXCdm/6ljObbzl3F9Pd9JcU3d45yQTnCovC

+N1MVfHAd84rbzwsXQd5HLgTY9qtSZ5GoBfZ8oYpp2
88riG/t4m7/66EV7k7Hl+c2COh9TJLHxMNral7L09dVUtdY8v6lM5cb+2S6kuKOMap/4QSQhxP4J0ZW4

N20fKxh+//Qlb/UeR0d7/UDEUYqc7FPpuJV0OWlMzIhjwBBgz7d8HSis5NAXRb+B48m59TTVnwjHxz7w

6I6If7LmmHNEbhERHQvBDu6RTiHux3jrIYYbr9Iee0
QmpmlXuEya6LzBhJMcPlBu47YDBOB0DhprNJtxi2pyojhQjJ8clbZHdJM+Xeq82v8uOXIP4/4UBrAiwW

9PGFQUhikqW9yNB1+d4wHswuG4T/jj7aqU3kvrO+jHbz2V/kFb68XSpucSjfDgMGoh7Z8vGgbd9Rwcw3

8qsElqFpv5b1j0lioxClq6IjGFo6t03plKzfyWIHzp
idGIWMuf3QaR0TL9eeeuO1dickmy35GEsEjLkwO8v2gulQNAkxm+eGxrS6nT/WVPJEse9MvirRh3u6ZR

ke8WWRYQ5NrTaM/1SjxUkndC0e9MJb3TuJyK3HrJH0ZeRM1sJNaeFoFqVLcswczONvuokbVpIpyxi7cJ

gTgtON3ScrniX0quXXYVHg2kIy/Kr3AVGnTaPEg0Va
KZZgmDLaeSKc1TXoO8MP6sShTa66XIMOxxrzxL4Phas0m04B3esxbXNsQ0Kw0ue7pK8NsFc6SymPeyGy

9cdQGB6w5vQhG1HqXL8DFb9cb14xJvdp0fDUQnaQumq3MRGQVY+bqeG6UmUvLmaUZ1d8TT0Ub+S7UoN9

ZLgDdr+z6G5GBslMcFhgD69RRA1ED3ZnAJBKvZvuor
bI7JFIcCakUACawx224Oz/2eKM8YWL7RybX1A53K7l7R+fwLd+XRdlL2TkyWyF1FyjnSSrGWRwiilrJY

icZhb74BWwtf1dm/yg452FSe7NryJXYLpGfRM7ZwnyTE8jocXtnjpR6DA/V9nkWmnPYAmLzlkhaN6Cmd

E9kRARAjk7zPlDlajp1agQK81iQH0bN3mfNkmjsyEA
CB8HYseg2ihVSjcZaemb2aPKjt+3hpcTMSpQL4omP1oHFRJkVkDMuOvEJIX+ED3xfCM7xY9ezjXBmS/Y

21Jvffh/E+3wQ5PBhdHmcMHm1B8Y8+0k9CarJjpNE6LjzUBC2cnCSrd80idxYh0WmG+Y/3Y7HByzvn4C

SP5a1vskQzIEBPD2BL8wXGsQrBbxCb6D5JHKQZfxkY
QhYxc68zkLjGt7wqO6vjSkogiqW1ML3pswP1XNX/ss3U+/6rRBB8jDWBEBf/oUVuffzaNfYeCfN0KsJr

UrPf9eOd/nUhZicj0P1KwocTzaP5xPgA2YH3eU0ZuEEUS622n3sl6cYPgwqjHy8DOZJbGFlyQmJf2gbd

1LUFF9Re6qGeLI9mEHL5YXwo2Bu3oR6EYeleQX4RoN
o8SEL/tWSvAXiDxF+MFgs9HaCCvNzlPJ8vVsT1AcXzZkUhglFz9/Bs3wI0I9pwRGQXGdVNRSW5POifBi

8jHBWnpYGO+UM0KiuyVaq55zaSnZmerIoqK8bfny9HHNA9YLs3vSmrkwqV6+sSXY3HY+SpSo5hCoqgNa

r6Za48Gspia4pm+tDR0udhRx2xOBVyCpPUqKygxSbY
OkNCqM7sfVtuksaDb9YuFiZSFXx/b//HRXFmeY8bI0QRtTMMevmbsjeDVDMYalptMB8JL9F01GKY2xkL

HinLVY4VXy/u7wocqZ6U3Kfem3i9NkKP3SBh01wEA2cBBpeSYzZOFPbqf8h2QaitRgPzKqQOOSsV8t7/

ki7qHApawHRnpitbgz2s75P97lV/gA0nufPOxHKwR8
1FEOvbgQzZllJju+tBGd4UqFnOlBBneQtqF0ga0TETiMlvxx8VT+L0OaIIaRpVh49KMEJ9iyB+HWC7CU

bTgD9SdLgtbPXHk/TIed4LdW/RKPHhA0k6Sb46YIGWeyqt2bPipaN8Dl0rg80Ymz/rqPin+9Pha6NLmm

k7iht8cjIXQN8pzy4dkvCyX90XcN4leHPT217/HRhG
wGLeWO+hE66TOKr0CQNl8+fD6DiGKIYp6uKeLieoGYeveY7wAA8vkh1uCG3ZzamKVqfY14SrIIwLRBiy

mSc+wLGu0NB+3jw/e4HTO/bHDzxZdhs0+w/RoAO8hIwE8VIb+PyU5UrBAPmGHqb6S9l3FAGlhZNvatnw

9e3PfNr4Lf01sro1242a7juwDdcNVktI20P519y64p
TW571UIjALoIEUNvQlk22UgJ+idf0VU7d4l+Xa0u3yYf+QHYDazcxiGRbScg98vGdBVGsuPgUh1EsswV

wjpW67FzRmEulwzz076q7mHZZiFaWMVIy+sSNMM8kK1HHW2Jhz7enW/wpxozRZx74qZp0dV2avN/acJ4

+MP5z9Z5ws1A16oB4dTR8aJS/8xuafNnQzzGou7/vL
U9K/DLMCAVxp3e6zYIH7DxyaDoio9nT56UklB2ZwmcSHAz5UhcRPRxtbAVxd9hwlpbp48H+Keoh1jqqw

e+wF3vIXHWA+i/mG6A3E5fpEfMavBrg4p1wm0ov1fU69MCkDZ+B6GuVo1s4hcw4aQF3H5qKXKZfwCP3D

aaeO2/krNwKIulKAhVUWgXDJ8Hh02LzbzDhZ+faNGD
7xG80mlgheWoe4iVVVG+CrMLSEIceE6HZ+yQDtTk2kvK0Z7nHClzcOIQJPpOPb4s3YpbaGWH+2iUpTeu

6haFqTi/Ivs/qO4P0FE5JdrPmaMAhLupXxGkdFjB2Yz2eG+wK9kDNZbAjA0mDW1cg++vqlkMbRxe4/kP

Vign2Osuuuvc2V51rrdBCPYDI5SZyeE6HteRyBuX4j
EtLRSZW00BBD7Efs1ITxpAuouZsrXcSfLRmyvylWRy6lxwwkzRSSXe45tp4UXZYq5C93CX2490d6udme

n8kONoLwbV2ga38O2BHXId4nzdF+kUX8hzKqjkarbKzew3OIyFJOTVAFe1zhmDyk8fF8ToXBA1clFAKK

VApiO1fPsVbsJ44ytclCSPhMox4CWPflbBqiIxrOcf
V5ltD0w5asFcx08lwAXd2d5X/j97hCXTqW/A0dHUTHzrd7TuCATq8e6IAMhy2E15FX1KyVb5YHkcnOCx

r0PosFHGw92a8UDXB283J0oOxvaTRidb1rbIAaNUV2bOKc/FifaiDgRaGt7TER+8etbwbupIqVyzvjSu

o2fbv9knmJ3bCxc4mRRmZaqrqDJ1kg4e6EaiSlBeKp
Puet1jiVcEGYp23hgwgh0Kca3vw5v7ziPKc+Prdav420FVE1+WO7TesK+eutfnxHlEvlPnRKV7+rQwxE

7UIIHCxBphvYTNJWAB5TysXV+K8ROEoMJ2TFKV6PeZJ9esieSO+JycHPrLsQPCqoq09MnZ+vssTe+7+8

hwKJQsfC41gLmfrnkzGzgRKT2NLiyr/LnoDTliCfdb
m9UX+gqv6ZAAjsH3B6fadB0ztoCZhWLHZS/hrqS0vhgjTqkIawbPALR/ESR5Pbv/CVE4Ttbfiz4qdA7/

ci8YUqzvLsPhp36Wox/NYEbHRWkbdkr/sNRqwUUIvYujD1iP4KzCuLW8+ReEtyEqTxqf7EE/nVxOoe4Q

OmHxmtU9aNzbc1vrwwApv1Qi2IH6oqBD74axBfbgcJ
nJbz4yFEEhZ9JSMqXW21G5Tfzb7lGEGC585hXjze4tF3bM2ziXBa9L766rzj72jrV+x5lkiCqR32/P05

tQ7I8ttGH7rJdGoRH7yDGYvqczl6a5Yy2bRivlJ2brUgtEJmi0HFWz3TbUJxkdoUnW9NdlLlExYI7jvW

/tLf+D9HHuP4EAm+vGOneYv+YuoBL1EuTuebJ8Iptu
OFLddgLmZJjbd7Dvg96EF7UimHw2UPFmO5B4zEj+74BbRordWoVr9DqRALnjifgpK5svmL/gYSor4KbJ

vLk6Jvj4Izo2MZXs6k182ugitrclmfVkzf5UVNt4dHtPzYtJp/MzRCarvQsSH34CZ2aSOLtV8VpxrVCY

+uXruS4oLStCsRMMgqlfHY69i6wPLChIzEu3xOhoQh
Bt8OWDm9kEGWpb1oLraNhaFrukxrHrSTLY1BKznFNnFmqxE1xTBeugmi8D5BHR+Q9buoDlfIh90XqrMq

npr78To5c5kHieUxoIso8TUNQPyKBf3IGM1gQ2oEY/QfnMy+9k0oQhOfe2E2ZSm7fcibsccze/JdKqjN

MGfw0l4us/SCP16a2GuGPzyzOUZjwyR3sxztO6i3Ob
1q9xL50tFe+ke12s+RnVt0Gt0mmt9FKWwjNEnPV37IYxSu+HeXzGOJj2hILYz8CzJzyThWWVIIcUNcj/

hPoGjgOnXsDef8pPGeQ70ZLxh+jesGVpI97J8a3gd5qSAGQYoR++Y7uUpRjsfllNZDR4SPTZNDHoB81P

SyNIlfZy/TAPltWwuWa4DiLgso5+JlS3r9aW2Tvz4+
17PoaYWk7aicDzkR2J7DrcMf83PsjNAXp6SC8zE4ID9DTADz84N9dWxC/ACRdzcMHyFnshwjB4T+mLTk

mx00oKJXnGnkH9bGkKh/EFl3LVhp5MuR/ZhAfo4jfAkKdWvwH75TyiiIf64nyd9zmWQ5sbrPe0Oo8IEz

pHaL7iHSzWBYoQ/kpmR4VNdj0OKnpQkbTriH2s90XV
UNe/gBy+Q1N05/jWTba/FnWXv8zcp9qmRAI/vl5z/ELCnC+1BlwB4GDdDxW4yVQ+rE0YgnTFE8X09E9c

SN/RsXnHI/nXJsfHMeY/sh8JCWnJ8pwnY7HfN1WicNsIe9PFZ6DtmPmb+J3TJ5N+P3RTmiBWJO8NvCJU

/xdA84sfhO3Q/cMySLqm3J8goXqpfEtwgB/W/9wei/
WUx9TCLrlUe8Ltf1fyCbrKNnTyPfQnbMVbrCLPgH+DqEWYj91cjT00CUTalm/YGRK+s9RaX6PnGNsW1I

zd+PEuqKZdPuzwCqZl86u45Z0KJum7aJ6dtJytT/d4Ug3KKfd7bfev2sjAbhTyU0pT3EImMO+QLlhh5H

lraz7zP1oTll7wz9BAuR09cga6csoKS+Ux4gXEsqRK
daFqe+FfvcgdnNejdZDkKO8BFQhCnSYqDIBHAJ9zmcyAzAH1I6KSKYuD8LvslEihmkXxIIz+/aq45pI2

9yYeQlecWjjyBl3Up+v+hPBxJrnHCHO8gtWPgIv3t4+cjOJn1lAZPXook/zpfe7+JmdouUpMFWJbi92G

EZ0f2ANUzNTwQGo/oIuONhJO+Da/ypk3qq2gmAjCIg
xwGsYZaDIVEKQQAXe3hHGMGZoWdumX46k4NSWNAJLF0xPrIqCHTdlaG2uTT8vUxiVxA8IRKFWuZPYSeI

Of3e6bKkqsSIgxYTEiEXbZiYGMZAY7YoFYL1EmRNehS7H0GIODLnkUB/Y1oG2zomyy00avF8UqHsuwgz

kNA3nbNhEJ4Z2lstzpTo9mL+74mAKwQnmsre98YdT5
3ernafIHNsZ3aCwjyMeZAqyiWCrsAWdxPSy4vuIh+dP0ZdlfWNoXv0OBgtL7LosPRmYuB2lGFfISByGP

nfw457+TMl4KtL3aQ/QYHrRg1U1f3GD9l4xfhjGC+LnTtYGw9+KuIFLkj2Py6wSjsZ9DO1XAuyNrl+X6

+GsfEpCqdex6MsSucG72UjjWCVGnYZ2zoC6CwUb6iH
i6MRLw99+BelGS3ZXBrW1khU3ry0CUjKJ2VTQGLZCQtmMbf6ugDO+8c3jh/Tq9v65OCJAIRTmyBHEcLe

OzGymU+rpGO53dZYLssiUOf07nDyinOVSKRqeZaQks9vfO0qJA1u/xabjxRTfrkQJUAm5hw9128FZeVm

z5Ns01CktoP8EE3X9eM+houKRlmuN7zjLKAYc0M7Ui
5Czm1wc31kf4+fDstIzfcQ47/CzXnZ4ODhCQinhPH2tQSgHSbAOa7xG+j54IDuxmZEMZLAUhaerGmkjc

3h6BCgVNmiQrmCfRGJl5sxx9sFR4VT9M7gXAWBAWS0sUKiDVwKdvQSPTsUWXAxIPRo0mbKL2iJKdAalp

ED0pVDghPy6hTZqVrRz6bFYIcCfPzzpzTIruBAwtex
j4HgMdJlt2jpkjNbuT8TrsbcQyaKo2+9FQC/gMSq36wDIZ+ZcG2xYZmVEi6CC+nAECi4aq0x3ClvLn7B

1LYpk0KMcJjLUdcUco0KxpvsQzhXDieWc61c6q50Ji5paoUs8sA+aC7dbi7YXT7lSF6GY21/N/d5/c1b

WTqml3K/vT5eSpRJTg+0/QDXdFaOsp2UuPDag+zPKx
KoFr3zZs7iIvBsrmbne6rjUbJTQvUTDzYRuqyiUtp9BI/n5wkJYI6Pcl98CNZCV7WHJ5T+S1SbBuecA7

UD6Y0ta8I2bDTYbe7P+yG8AQEt0fFOn8m6KmgB9y/WOlcclMrS1m0U71+5vRt11i7ptbcdhxU/lSzV2x

wTo9AEg2mYeuJqtjxvGiYW052UdxO+pcIcFylJGe5Q
/awT+6Y0kFJ8Y9ryi7vYbd5GrYFWf/rEMMUz7fLhVt5cFncweBoZb4ZzF51Id3ZMpWst/8/sq6ap1ibQ

74fCgurH4277JmoE+U+RCerBW3uoU5SXDLmsQDpUlLLDmqN4ywAmuUO83C7sVVx5Tus7BZpslKb4Mxb5

ng9kyqXkFzzTwOqV1jpYcAn/ojHptc4Onp0FPLK5oB
UfTVAXzEcHP0jdh2Wpr8mS5hgTCX4COKK/Pi8Tojm83OgTrzsKXxyiORe6cK6lJ5FdVOktPx8e3pDkB7

GjQclBjugkNfuyEnBZHumO5YMxdU4o0jc/lzq1Aw/MVzWT3YnmBPI1vXhcVqGDhWt9E2xN0XeaBXaUMC

NU4V6Y8zV3MRFxDuagNGJcllr4SxFuDX1VgA3+qRUB
1KYSvkM2kl1j/Lk2T2jO5YyOMTw4ESlH4aX3ddVj0IdMkIPIam8sMefpRmnvWQBj3irlbFhIRYhTU6hu

Z/UqZjsBaUtUv0SyrNkjk452y2hrlD6MvNHX5WqqXV7N0C02AsaT/BfFNS9O3pZi3lwlZfW4cUgJxtxu

RbQ9vaujvOcsG9wvAsOH+eyDni24b6NlqOhZRLc46C
9n57Y97+1gW71gtGeag8mWUhw9dzYf6nrheMNxm77Cz0c8qyWFbxGh35T1iEIMoJv4d51gT4ddQJvsC2

i4obaIbvWJswN7qTTBWB1vr+N1FAiruIs8tZnXpv7jy/DwbNAa+7dXVgXYPXsEeGGSz+jI/ddW/L81GL

lA7E8WYpG13ygfXkoH5XK24/a44hkrXWEk0gHh0P6D
qqtHbLjNDS9plbYG102z4kq/BOs9qsyQ/mi8q7mIiL6VdrPn/XgXX2q557onK8fc209uJxcKxUPHpwqo

gJcjb/0Ba/d5pQuG3GTSRlUXA5YBGqnxzsWwLz18dpi+4tOLGl+bk2+1w3aptlOsBbCYLpACyIxP3f1p

Bmg7+OoNS0QJQGoznc9muwIa8Kp+5VLztS48zipu14
3P1S/hkT+tgy/gT5lxDuWY5LjTRO5trmczOCozBF73Zx5tmK30S0w7xnidpOmi6u194W54Vad8cH4YQd

FHFsvLw5oDLggXllA+dw/KDR/4K75H+3ZtmAtMLr24oMypigU27RlZS96av3uZn2DhXQvcGptv3TvAhN

eF+b1NpXR1hS3VFAEpWme/qRBvZe2U7RL8yhoQJA3g
8bND1VF24jCMrAmeKA8G6h9T2NZ45EmlF9GHHxcFBm0rR02RP1um7d7p5a037opvbDH2UQmOd+iTVld0

6UZm2Kg5Wz37ZQVi1qx79y4jLUTnxPWlX1Kv+SHcUCeg3g46WZwDCxff7zJV7M2xAOQsQnbv1A2Kka7h

6bXGrHZZJ37MDJt4135C+Lw2KO4N2DdYhFHKECRCZq
zodsvBRjRFSbNtmdMtHaeNOWK1+g6oS6LLZ7H/H/qyEcoeNyrXPpKIqUkpsnGGv5rUGjEdwF/RELRYcG

kkP3KbbrD5UP6wJHhTvjPHGEjIZVHb8L8w9z2+d+buued+OB/ufj+smf+aZPJbWc/S/7trQqmLwUJ3KO

ZjfInU8So0Ju7tkg43Rl39dUQAtPI5zSyKT6W0aeMg
8RLVKiE0FMxAq3/Dd9zqv8mvKF/TpqTMvt5myBPVmKDYYnwsClDMxL6eIZhZxAyemQA9OeRkM4HeiRqE

rJjAwnzTcVWG975Q3jij8o5P4SLBpqMiNL1A57KkrqHYuWxMTgriCmLiL7SOtWp1TB6siQjXZniKC6A7

v8cebYDjbq4x6G4B7AzJ5H25f65Ca/cPp/rv7bn/p4
jJOxrFrTSHee+/WodpmsL8d1bYiO1Yx7XC8LAWlwdoEeGnnlZgsqEZpid6Yj6aPrO4FuXstOg5QEF7Tl

Ijgk/sSnamNS0q0jTJUbzLhpR7uG1ac4RbsXJivMQ0bv/rOXVzmnKaObtVFlFnTJq4233kMK/9JyUcs0

LzB3vXw1/UnEUGiPyz3vHaMoWyZ+ku/k1XsSn0JQVe
T2MfkyYcyLVyEGYjAtOYjKNb0L9+TFoqwLPPLYn+lNd6WEOXJdCvVdWtVDYhay4CbDJf4Q72T5YiNtVQ

t/RhawFjkJ8OML2UbnQ2NeE35xvazrHd3FBM3xqzlc6co2JdMPWjiXRnbZxYsP5FWYlcvAwmtgy8KnG1

T9+DwjICG/LdThQBWSULWw3GoR1N6owNsfbc+XidP2
Ng2LEbJpK6EO2wZo9Pr6HuL7Vaui+xiM62G+aBTOzyPecMZ5/DueaAnDx8UJqQCkFhDMOMRUmLKnTf17

xf4GTCRVApl3v/Iq8dFtmXv72J8XwDVgWegP2Wqd1Ubvqb0yjevxouEIJBSbqFamv0BE+z13qsaxXl3b

hpQqgeOqfZ278LhPUrC2UcCa2qK/qSL2a5k/gBhN1z
Dzgxag8+v8yQARkGjXckmjeSfBi/pmJqMHIwthAvYadTaBCbXdKsgKBR4HcEIeR2v0cIP02WtgKUZs8j

WDlmIzEdfz55dPrmzDDKfb7TATvkSx2ec/LK5NeLIEGU+dWh02qZvuhHwFO8Rags2Cc9ZmbNk9/sMNtk

8xJpYIrg958xJy6wcHUGdE4ggYqYqi+yMuUaTs7vDm
NOG+Uzl0scq9Xywz0IBcQ19ArfjNwnpZUG1HLEZfzwCNpq+zVS60JgBGMNXhi7ExueCjvxz67r+/H89s

d95Wq7MMenKa3iiA4VsG/mwyp7qKM30EnXd3/dh2LFxCnBhZY41GeHqqBgaWxDL4Ff+j0nefFieqI9Ms

4RH2hOvbGldWJq0U9j8mTix4WX7LrarkkZONxJhWSW
ajiuOMsYEITlgZMFoN7dFhd3UzVT9SU/57nZ1yOuyvvzO+1wHE1PC/tJCV+dzilmHPxGqbdZKQMU/e3z

rxjOApi1p2WSp+QHqEq5qkO5Phqb8BbP7ZzkPPlh/hqc5+6+68csewwDBYNKA+Gbqwskmi+O6rRFzTYO

sHbfLWrcGEGwnMmKwgxTiBfgbhkgfMWVRkqnx1A7j9
pgut6nzYb64ojfKrRzVE3S27ok/bQmF1/HHomJHy5F6883dZ6kyj1IWnQj9dW+guNMAp9tzJhgXhdSPY

hgPnDuk9jY6YRId60rbb76es8VqdEpyUdCk7U+xYzwIatWhz+ckLfdJWztz4gnqCEOWUlbEaOz4KWyCd

TnZyNyoWXp81PVwOzdYKV2OfIS5XV9lX463rqPlwtE
39QUNcyhGPFKWVBmwJcTEnO+HBY2Kq5yvfv5KI7ugUUlhDMip0qJ3iMfPvrVPrkfApQLrjx+tHg7JAdV

MH1P7zpnZ2AC5nzBa0Xe8rpRzIGXtD3CNHod52CbEqPFFWiFzIf+usTMyAvr019UdPy8sAaXZDQXsbCp

Yyge50558uAmTygpoAGWoZL5FZf/RESaN5APoaVoNx
wsCxtu04fFLkT7x647ks6UL4do43OtTrk3X+gTaqS/ILTnxwDARyWH6mI1yEJM3evSHGvNQ+DmJqNFUz

jdacvTgs/QFaHGmn1XqWVY+/6zYTfKwNXrO15xUY26cN7TW22yP6uunwCWHCQumWRqA6T+ykdt7ByDwz

M+QB/3vVrsl+wstK/V5GuqPofOVfxrQIILBMXLquL1
Q3+hU+G3sFXDruE+U+sUvy7iTYkoo9aWQvM1vtI1dlZWB3Chf/EkcJtA7IwQU0FObmq+JLsi3Br/oG9J

/BbwSc8hUUXzEQrXM5+4VWdSNVYiu2HERABYMq1Ex5rwgYvdgj2C+0pf5fKKrXfSj9Xp5opy9fQQwShz

3qRsHVrFUQ6Ct6E26Zvo0TxzgqUm7T6FBaMihSJDJE
pHpXW3FPdu8NbJFCQtOUACLI0QdulSEvTkR11OmvjEGS9AU9S8gJr8pdJjQtPmpxG8TDfdqWnly4cYa2

Cr6GVesJX1dWb3trs+IwvA9zAxQXALCYumkutkgGEXUGJ5/3G9fg0P8pYOV/mTrolRMQW1m88NZbY5gV

HbpedONq3v8LhkzezfCf323zXeLY6dsnvb1rndo1lK
9w5Uru3oVDJdOgB1eKyKExwBZKcFcU+lVmPXaWtfNXbAoxLvfCYxmmX3pIq0LvnmikWmHUhcwN8ZIUDH

LM9UaG4rFkAGvP8ndvbwPWi30pUmV6asn5NuFBHDm1Uq22h8LGyFGBg890Y6TG9frbJCSC5z8JeXI8hQ

R1/i5dMROJaEZs1rg2QF+d0giQk+zTbef4sxGlswro
rcb8tcLHSE8fWh4T+yfo0akx7tPyv3uzyHBLH7gyv9KAZ9eUYSIpAHUA7Bmow5gG3H7gCCmkcpCAlJS4

utF7EjE0rbtD2Z5GLHnjYih1aFs6sBTi2m27DEiN3D5H2J8VYKVoMD2ybtIeQzGcLMuXc9ueL6S34RFH

TRwOm07pA7SatGCdlNkgvjbB59xMrtSoPkxQ0+D/BX
hpl20VzN457JY3yrei6/yQJkwJUPQTzUxw30lpG6W7lvHgcrxzl0hfLQPdUqpTM05mGRt8mvjKLQlH1j

SC/m22I+Pyh+C0eQl1V83gB1OPUULyn9tx5mJORnePFqRWbxlg5uwv7C/T6BRLS4wP3P4MLCUskDM7sz

uBRkm6vhiXNDpoKM+a7+OmZth20zwfoGmKsaBlDdaN
3Mzi9uuLhbA3U/VdDn38RsDmoOYZivu3b9RBUxT8jYzmgwpraNMs8VCG8NzfOXIjjq0cFEtRU/TpXmQ0

pBMec0MhU12kMDH+c5WnC7sCRpcJOOu9HrwbuRA4CkDQreXeTMR00hy6NIM9QSgsnQ6wWys8aABEl5M4

Gn9vzBuDkyLfWESiAYt9717qRluBhnl1rtgeNX5G6R
JehsP/nWiDdl+0LBktr7PQqheRYFuyKoH+nFUxOJkFaZZQJVkMOr3soArPXG9p/gE8j7yfJJg99CUn5B

dzVBmyByfQ1gXZmhhkHxqKxay82CaXf5CkeNYznms8awXXWBYazDhZUKw8hI5X5oxMEyaNbbB+cGUFqF

jWyyAMShFk/158gqspb4i0wlxyMmzLC5+HA5+j0Tzt
TRgAq0j80BF7e3ngW8vlwMfnexbos0J7ibNRav8VzNuCloh451Ik/AGyPyV7i893LkkdNQCKYrRm7DDn

R5CI9bjRkzpOQY8MDbUAyfX/tUtfqEpwkGL48Vlq0UYFwySzN69+mfamQbYK1Aiawmwaf5y04teHhd3J

scp2PwTv9RQXQ/xvjW7qCS9h2TRDSZOmkdYZrPoMrr
Osoi1ONfwvxaMLKd2xaYxoTUwLkKhAfuCPyulbqOr9KBu1gcAbwV29ayPo7lPDnfuza7NK/U60BBgpvp

Tx4KcKLxH9ZDyjr9kIkj7D9Ui7MH8+6TC48ORv45AOKOdCcLXgKTrL5krK42ZBub4fGEUIhNVH3mozax

qpgVPtQOtwXmRJMZM6NjDurFkkeyrzr/PEjG4Lshia
xVuSix7QeZq4Ml3l+8/GfteWiyBn/dZEsCEDRMNaJhxkMjkt3rXym/YAb2pVBsNVO/zVf8WOVQbFJI5R

kTFRsePDeDc7XprnkvyiXxWJl540Xgj/yqiFCY5dQcYjriZRFWq3BqtqCVA18xZLzTfHLZEqVxIgAA7m

x9zlho5N8UJ2IvvSQByu901Qn8IZv+GbXP5VPnGrj9
F6iqfunPF72TkhMAeE43m20KYDvOQ9NxlWMamKMDSxSbJ7Qj5TxmQOJlKAyGfr9xyC4H4zqh2M2N80aU

dh1t3Ps8fwEf770AIL03OYOX41rukStpO47DvsDlKwDCOT09/yLHIZoIb9umUt4/HMW+pErkoRePhuHB

HbZ01imnxVLEPuzabGKoX5tJWPrHN6RKPCxuMEA7BX
tzrZFJLzYsu/9m+0ScOqO6jWZ5jzJ1N/+ShvKpb87szjSHJqmRqIMhXN4rZtOOtV8Ra/2TAInSrgGeZY

dSDdlAKmPeYYRb+z1aPB4q+ZuXXRu7HrvySoNedH5m/WLu94uMcO178qoAn/IrWn/8Q7vgMOZIN6ExNW

azyqrgSxwNpOUMLeIvRn3hLKAlnY/lhGRmVcW90tT5
ie46gM5vTfQ8U9IhbYOMQtQ+T+QoyK8Wl6MbTxsy1HOvO8NkjcqbMDs3wUKFxMZqhKc51MnZkqwwuSEG

301z3eDn6SrCI0q5aKsZxDe6p9yuJ5lmF6bLPQaFcGLwzpYfATcsOvqtLSSZM486Clbd23PuEKibjtOS

g04mEgMFy1BfZJQxMftpyglFWJhanSKsvlOLaUIUew
s9c0WUSxfG7tP+NwUxeNJMdf/+UHl6AppSp77JrxEKx6uEOhDu6hjAzaay0Kcb+9YmMHr15FasYVPn7z

9o0K5BYqkHsDzDJ9EhzbVNAOfK3j0f0OPIeZsk17i612gLzwE3Pk/9jPo07lgXTnXTx9bJkWhRS2ECRu

SXD8v7Fd1gdcmKugN073Nt20U12Wij9g45Kn54EYEo
BI7YBKDlq9q1MSi695xy8n1axMtIvT1hV1tV3jqGp1kWJs9KWHDPBEFMeHeMSRv9Ii+x/d5y6emyvkdv

HKx5bxhTds1yuEYRlh8cC2K23ofTmkefsPKqw4qAcsurUoYu/GD0jFakInEau8Cj4s7U8HDG+c7RR+d3

6Yj7kseCleO7C6jdvW/dexvvw3uNLN16yCjsfufgBP
aqGds5gP/aQ4BszuI9An5RLruXr41fC4SR2buiwE+CZp4/V+8HfS4w9qOQ6KOQjdaVwSHq1Znyyf/77g

hwXDNfoI4S3XduyocTV9JN3E5RnpfrOgjb8/OMBWE61ry9/Sas1akSy+R945bqJb6syZ2zD/c04Y51zP

SB9jPhwD7ZA3rO5yzayZWQ3tFC/Ludmila/Whd6m96T71Z
5pEx60VAbCHE5Q/KjKe4wVmaL+1GQUlGc7NQa5eA8SrxA1azB85S55svZkJwYB0f5SZEp5f8w4gN48Ow

AXfAu3k7jeMyEMU6vDuVGal+CS9i0/6Rwg52bIYvggAlPc97zc0ivWzI1r4GtHbfnp7tSMgCM2HnYLvl

4PlQCHhNUa54ljfcw+pnOsWbi28+zw1HpfVq5ZG7ye
oYu7YDKejhtRMtp5MKngdjB8SDoxh6nnaeWdVQ1KYxZXyvzxh1B3EB8kv0jPVWJfvz5fOnO9zsDjPsr9

Oi0pJtbhsjugq9NrKsm4oS92mHgmFBesjJGvml8ru15Hd/s1vTvrBJC0THZ1ihR2sP44+oAKZBaaO0bh

I7l+p6PnZGkVjvSmsUITfskPv5dZummqTNX9wErPCd
+ECe4Szf/Qn2ZRfR4IhtKE1RrarzsY0S9w3fH50rALDfgfeWRQN0VQTbD0iEbiLDF93IN1Yr5EonRE5n

NwtN2Rh9bw+hf5Q32jQNM/WP6ZFbybqHrUn/QJAE2Q0V9x0VDO8resATwd+rbXaILt8xhYrgyl6pLEqG

ls83Gj2cJqDhqyimUJxipI4xmckyANOJW+aFpqo+Hd
Xmrk1OvDOx8mX6TiAzMeoBSgmUecrhKdqXEjLUhEhWO1Vfzjwj3TryKp2OC4J+uu6urTPmjSTeI+0ylb

Lenny/Y+FqwZ05R76e3faWKVmLj9JOOVBHGqu1WuqJdLp6/2uLsWCWmgA82ybEVSEX1PrqPjGLFu2+Rge+

247ZgOjpsaDF5kwhhooRtbgTGesKrpqzQ8Zb+1Qqer
cOYjUXa2T7gAvscGc3xy6qUSUKiAvb89DAy8WSbgJrVKgR7joXOzcaKcl/D3OkXNVGF749ixuL25kPc0

FoHCNiyjAd4QO9tsrv7dJ/bpzxf91jCzWklBYeRcFsxFJ88sx+svb8sfWpLHENnhFdagIvd/ttN/l+pp

9z5padugHvLI0P+07l/mcnYDZLiw/JLhCXR6nMk1rD
PMAhd2Es44wRdsfCRJ2TheqhhEpy/F7V3douCwHML2ydMgFwNGku7A+jup/fzwx2iXqQpBfBWaQfnzVw

Ul2VcDm8B2t4V9XFi0KQf1zqTNg9P4sQvHvzxizO76yb6PiQ+cUjnpEpx++vICfxB/TLyKFN+BISIFHk

xjaaEcjVsITjGldlN/+wCdFNcRQk7abssYRGcghqRX
zPF3c9RSgAsEoAwiYla30VVE3pVtHNpFte85ggsLIzmmNci5JywH4HuTOWEhmL3W6XeMfwa0PJysCcDu

Dy6QBrG3Zf6G1q6E3A2eDR9pk/JNGS7t+y9byetbJK7EF7E2a+x/Ug2ezS1ZnP6bLqrGWQHz1ym0njfQ

zdTafZYi8CJfXSwe24p5MOWsdr7F0m8oI+thvChdMr
wJ9INPuGNVPH/dbsCmSrx00Q64bF+whKEmJX0YQRDfjuiqtr7PJanPet+0gduPBO50sOV/vJTjZ1LieY

qYYQgnkDMAJv5jZ7qKM1cGfQmJQV/D6I/QnbfTSXr2oZnimNwImuJhsLVrZ4j2mjEdUcq75IXZAZoif5

fvLREPmnJbzujryFR3HRe7dUyXSUtowLY/LyecPH48
YWWjbOYxxz97f31+YlSm2aG4UAidiDTH0erPL9CLP8P33l1SXSAjjAjXL69xmTj5d/eAsZfK7ZVZ2bJQ

0E59GQ0J28N/SiD7CmzfRtA8TnXLiEHDNtKwxhkrX07l92felyfEmb1wzD/0NKD8ws5VW640taoeS5i1

KqoaIzb8cPycyOb+gHmv6L0FHddheptQ9rTg+L9iE0
NmkcPwT1A/S+QRev7/bJXt/k1UcHpPDtP6yNcTLjrRDPDJVn8XdI7px90ul2519Dl4QVlKbu0E2Ql0HX

JFIuU395BkXaxT66v3CqPycyeH2xyFbESQH5AQ2aFy2tJVHii8FCPD2kwziEv62mKhTmGiWh3hr1Z5gc

NglgH4hXVrBb3mJKnVoiNRgVaC5HCN7fiQn1ntpZe1
+rv6nNqcgshjpsEB43QKJE/3pudh5y/ARxhLyadMlWvfb8r+TopqovqBzqK7JJQA22C5+GH7YSIBkxtX

yzriwIoFYML737Q+XNR8Bxfh/nQjspx0OJ6Y/jLoYmwIsXezUoOn4esE7dBATYV2XN913hEtKoC5idof

a6ne3hwtMKZqYN7JZ+FflunBmpwxzL7pFtVawdTqbG
yA3nU838MQqWVGz6xMZ1mUef0ThAEo+r1NXupsAYmM6QO4DR/3T1lKabWNI9lA3XpfLfNjHD68N6KmvG

vT9tjFB2we98byxbFVp3dsbr/XUQNkN4WzZ3HNxlgGu97nv60Wz8c3wqwW1k6PBRyWDwYzEyz0WFFufO

e55z3THda5jHjAHfCAMMx7ffrnJIUx0u0hLD6LeuTF
WIdLuh5UC3Zqd2jJzOXzgXgFrCegNsCsbopb8DHnuq2HBrjyQY4XUBdIYzPPBzRop0z1ZxW1D2AuM9dh

gERS+wKnZQEcjMBZjTo+A/kJ2IiiOg5wruE0ww+LNj2k2sWX+m9kJfeE2XbW3q4mtB+qH7NW5Fi7htmn

ImyvDazeM7lCoEinE6n91cS2Dxt0gJGW2kU6g+qnwX
jdS7pyeC4krUzWnb5dlXKnKxJvGuG/uOmmEIqowJFAkUvTu74lvVyomou5a2TpZp03lYsgCv5mVeLGwu

SaQrsHcyBgyiq5kcPkXdhciwJ47gQAeVM7mWCFOuVTuKZFOrBgpbqS56wY7DCoO0hA+BXg+pH3YRM1Lr

0UT68popEbgPMTLGALth/7tVJkhczgxW0VMOcxYQqt
Z+1ey9H2gmly6aQ473k7xOl1BmF8+DI2SCcicntwAnmPKphVGEucw+tVbDhku78LEMbdAgf9mtrEM5tq

3zC4FEMx1ebUKY36xRGIwpO3sWnKpViebnPgYmNTuu4mwUHQJXk2uep39U0m1lw+Eb1R0PriPb+Hy6w+

+VV7XxNbs8j26+4eMKF+xuN54Pfda6x87sMB7EkpU2
ulkuqc3yqxG1AicN/T8QgvYSbbByETrD93fNaFtzMLSdqt8zX0gUBwxdtXUHnYHAnb94CqHNA6kx9vkD

euTuylp0CmpDA+ezVZtElr/HWFX3X8NF8vcXphDmcnEDXLh656eIPbp7O8SHs80/Wq3LF1qgMvkleshq

rtPNDuXX02lhpUvpgewrw8a4jEEMX7lD4XNLIpIOAq
y0rxwM4ITFYkLjJT48nSa82P52K5XvYFIdkssODU9g951BeFCAs+DHgYW8JT1jOEwZcXAnGPOhaGYibR

Is6Nl99aRliSpL6Gt7c3QgvFpnLr0UPmHe1D2OJxU2gQN3OB7NZD/7tZTiIt2xKUBh8x+DPFYlKa++E1

vHBA48jEuutvsvHZSCdF3L9EH+x8FyHAw61MgHVssX
yp5OO11KZne4ObhcgLlxaq6JR7TPhIomes0cY5Z1RHnVU8H7+8hcUbdlejCCLTeu1Hev+kZq1ml0+CB1

+y/oGGiuoY4i2MrxKDqmv3mnJuDEVxw0wRjk8k5WVeRBmhQd721mcsQmB0QeoHBZqP5XhyyDg2jEHKVT

xdMN4eg8soVUM0NqO63C2lIfCt/gZu8P70ZmPsx++Y
ixTQcIPNbuNZcSs7P6RzHLQwxgPdwm1M6v4er1U1ze1IFV1cDl2p6zipatTWiezeWi76kk9bvdXlKfF6

PhTf2MOKFkF40D+i6oSWlGQj7zwqQxZLAT78a1Z2SWj8KkwehQSOK8LP6flaz1C/CqD4sQoeQ+Yzepb5

DDc64H3fAXwrBq/B+g7xNwdpzFMBGXWlf2chaXKtWh
NK1GJeHltkXVYF8TDbxM75x70Y1MumR2oF/AWVGFJT7h9Xtm+4Xxw6fOpF9bZvwMPoxdGKqDZWsWGuth

OVLYakgWLVl6bmZWrKGnkmCmWwO7vPx9oeQLaawwFtxOcfEeWWfZ+Cl2sd4FvUfcM1n3ABYP3S4y/M1G

QcAQzlCiKr1L8w+Ioyqa0Nl3KOseCBHNa9aFou+sBv
8SPAJYTmwwkx4jKKuZ9vf692U8KB6eYXy4ZxTLFmo335JQXJd3/z2kdsQltncwRROnCT/ZnFnLHEp2Fx

q+ku16xniK2IU9pHBcqdsMS140kRi5hsrNa7PR3GHMI1YnyMZQI9JiljlV5KAx17OU51fTHJ9TYn+CBg

MCyiY2ISiD+PNr2TDZ0Hsdz8O5m1wCHswTQcZ8AbNh
50MUEDERsvJvNZptJXlLRT1pgBxGq+BObndYJl2hVVPCJqfoqxUfqCA5Kr3bhe6vJudCq5dmW0K/fuVi

rPrU9E4sW9Yb7rQKhpskagwLJMwivbT+cQXWDMCizdYV5AwK4V6ME47fmhIu/iFSUWlVNz2lQvb+Amwv

7z0z5HnIXSZqD7dkNk8Xu32aw2RojRsrqQ7RGAkXzJ
ksT5+T0jlGYnCDsBLzv1LKZpVkePx367DPLsNsUalL8fg9fT5cNIBE7IwVcq29MbBaV2RHgSYh6yATHa

Egq0DpfM3T1t1EkLwzQAm1/JX8idH+VeP8o/2iLIbNQgLCOidPATr7RLEBtJQIbIP/nAdcCGjiQa3j6n

CijZty2UIiSabkuvNrTt+jw4gccqZO+Pe2nI/dm6N7
8FqZXhahgcB+7K4ahJhBeempHq7PpBijkcXrThk5M8Xx6qULS78u+7pJmmbuolsg512trmtBOxN0QoV1

8AiK3Fu6WyImjA7ajsBCtqmpbGUh1XjMPE00Q/9D+sVX8nEyQr/jA43p+YrfJNwxFAouzsmoKQX28UXz

2qM/XPIPRhxv1e+yR0BtfTdyxQbd7ngdoBc4vGQNec
sjiPZ5f19fFWEKpGnyOoHVt8PgFCSr9KkB5Se09MBbl13Pcp+Se8DZh3d/yR0yaCSHRXEp+SXd+oT+7T

rs+tkbTraO9ou3U7mZtc5s2+C53AhFEwCd+gnIn2delDiqOXvQPf2O69msvFMdbgukqhd4Ks5ZNVM+4A

T5aRM+h8Z2f+1tb0fSkMUAb0Sbc+pVZZZQsot9eXAo
y6EwQ8n8iZxjj57I+tOh4GRTjHf+PPAIOQqiaZFRnQtcCuNyLluieYdmmQAKqljnSv19Ny4cJDSEGc9M

g1g2sS0nlhoVpEpAm0Gm65WNpwQTph1I9qQpemaU6RYZerT7a9ydxQ7tQVXTbMfhw5GeR8Bgw8GDN/9E

Z5JXxGxxWNCNy1EdmlkxQqLvLI4UQmDVO6UiohIIxZ
YdeVw6qmPy2BsJvdoIJCJ9MyZdT0EngPV6kYC582MlTD4Zk6SuYo8wdjCe+3v3xkhwYMiYI633W+tVgr

xjbScbhdqmSqts1QqczU/Ij3oikOdq4mQ3qS8Hdf5CEh2ixp5rs8UfjHzqZRQtOicKdmSK7fchiROQaw

YS3a9ieqnvhXA/EMGhq5c68vmGX2KrigzFvLFUP7Hq
9X7soEU23No4DJ6ahjnnZWhoSXF4vkH+lsBzbUWyzdkshTakQKyK/5nS/04Frp1ApLCkl8D56fUQZ8It

QOTWVvJ8L2WRkMX+OEuzTAkn/zx+pJphLqbd3c8Qi1vOCf7MnTbmxanSKUAanJjStzRS5OpI3kUPz8t0

xruVg0yfMMY5ZjwnEYkDZ4+5aTFnZz5ET3pr7I3vRc
Vgsea/iMax4vVAIFRf0tXYdOaMdjRXEF5Bgqe7HXNXVVWDgkNp7Cii2MGc/Cxmlb79mmHdzq8m17htiv

Z9OQvFvFW4WX6YaKGqHZTSXmT1BRCRd79oIflOFoUVm1Z43owdeqeHB6OVVqe1UyP5j5EEjNA4ehwh/w

7e8rxo/DGvUYTr3Ar7D8JNjpvhD5Y1cChmBQwPZcHu
6PY3+whieZjloxgGtDroHfqpQuc1lvZ4Tx8YxEruaecxpW+cl9jA4E2ggIg6zHJuhWV9b0IrW+dz1irN

ja6jqjl5IMUHCEMOUvM/yzYmsal+lGteaFY1LM1Wi4hnfw39oj5c1RmycBc20uhPB5VuJJU+9xT5/RSO

knEVvBdHm2n6WnETjqIxsqqmGnIfPSjpKcCaSSLEAP
x2vJCr/YIR1+X8HstK6KEIacJs7ZWRSfb5U4/UbmSUuys+MA1Shbzfjob0JcKAimzfaeXfjFuwNewA/P

NarKa/R5j+d+VeVVP8pXLaK5mftK36VVZFik4Xqf8VQLpfmnipGW7yj7oIRw/ngjvsX3keBFFmQ8M8GV

0BfMpn2dvi8KTlNH1EcKU3tAflTQNnmuKLtFhEYC29
/eznSVnQ2Ha72C6tE2sqIrrXuk/tlB00XDkVuOpK0BfTa0nG1iAjM+hxWiaS37/Rb9dqi9qNrhggEulF

POAVLo7GSlV1UHdBthqKrWsku8JK23L/dyaqFjZnnrj+l0yoSYf+2tadCGDnVTjA43v6ZyFG24RwQufy

obK+Yg11wuQ8UAplfCdl1tzY7Qs0F+40H01CiB0ush
dc5RytkRpT+O6w8gtRH+gfyxZdepoi9/WeVrwkWptzfNHR2CmnSq2VrLqsjfG68ZooIjAgH7S7cXgNF4

e1Vr/sVMmj4NiIensCsBGj07fccQoxa2ejxHxcOXTPmRWuQGa5BEprCYo1csE0+QOSULAQaVmsi62d1Y

Kv88DKEGAYjoB2JfJQh7HfRgmRBmOKfpqdgmWrHVUQ
mFjjkwa70masMZJO+d2qKcRdJlhPkVe25f6/luVl2E2aSwvjq0gbdiPV0qSv4Ob+vjN7C+7x6lYF0Cdo

c6l2hKwzBaq8A3pZ/XhCrCe3w5/uxnw42WOZqUmf68DF2Q+0sF/N54QHQdi0NZU8/bKsB31+MY7SR8uN

WP02VamXJI/4rbO2Jq2RvI8UdZepa4Ol1d4V8N8B59
IENZBvsZnRBJ2/Z8FL4yN6v6L0/yVO3Z+VPR4V58jYXk7MdPz2QNIC3tKx9pS9BEdcVkz4Mw2+C3GgAr

GfejEv+pHMsiIEaRRAg9Qs64pklsNchA5RntnmelCrSmiH19LwkECSQuEEFY03Oh5BIZXypYQG2dp49Z

NPZ/i9Cri5eqCg8z+a1hz+AutcOhX+9wC+thTsKbtr
B3DX0HCe3anhn4kGu+RgG4jDN8oyQD+6axGRQbKDhG/60eIcVPObM8Q1bvvc1pKPo1YP9UoLbwxzwn7I

OZO9fc6lSmedkDEuQSIpH10W/O6m0xawtMoyNgOenzwZOr4EvCpW+UEpCYR5YJGvprTGAzi4K1/lSfGU

O82Tgx5KwdulMi03jCdoxf//H6insC6XdqLMtY27Yo
SC41ZLgDDLSw6Lnu5yZIgOeBPe0vIq5FH6tgxcTjDGmgjpXpPYXu+8cAogzv9fhwAxH9eVAByrV6ntu9

lt6EtMR1l4eZlDTW0jnsJSQtOhuBKnEqflf3dpygrA386bmWUIRksJ5mDmwGwWCl5GltcqZW3hkBl/pe

5mFvPUZMdkD79TmcLgGzOX3XNRJYw3OpDPdq4XrsKP
OATT9TObir/sip67rb8XaB61lj+dqC5VNwRrRj4m1YKCLc1E0JvXkwa9hXqSXBSRuYP55BC1XIg0ZMVs

Jw63jy/rR6kp6H+Sb2AYhwKJbHqFEXo/mH7LNZh1/Jl4vYdLZMJICFUctr5AMKs5vqn9KBqkNOxkPRYy

AR+gsp5bp7+XeIVBbEAz88XAHPLL3TYYWmjlkbATZB
Qys5jEOEV+IzE/UKbbGA6GZU/Qel4epOUMjeJ359A1sdyueDnkyoigTyDQErUKkjvZQ7sMGvzCKYA+jr

Brrh/dSeg943c+8MbtFjAMkiS7XXV1fak0PAWLjpVvHGdSujwWcF/I5xk0n33qc4h/RVAwn0713+GzZ9

FVnf3zduf9/XLzN9SIfK/5kyGATKE4w0bn79R3w3tA
aSBre97l/2GUFVkTzb/WWL5aHRL02NyU4PpGj7uI3X8cryuxhrePcUpss+7Ou5UzaED6BM91bBTvGm7Y

i7FpxyW7bWRwMYpe9CTF5GBnkO0P+rotsB3kEjQRgrMUxLEBL/h52d+NqB1sOSY/n6q+fdW+h0LZaTfv

6e+u01xDlWDI3pJjKJSvupfRZPXS8T3OOLXgAXzs2u
gvLr0UkEyU/dvZsGrAkpL0eZob3/DIMcnITQIegG/zdprNn3prexaaSnPH/yKSljnMEovW0NF6Wv8B75

Qcd/CG/+tYHdcf1ERmATyeQgIn5fiWCe4hXWyEGSs9h0E/3oNY88kt+p/Vwsm0fih0j5Fh40yOIZTC5S

cb/1X4V1IiG2cNJVIlN0Z9IT5sjBYtL7fF7+Zv3yww
ckL6k246nJXSXfagmWlvg2Fwkf4J/Ci+rK7rhvjvuU350z9US+Wr5drPyU+Y7v3gES3co2RH9OiCkhn1

x0F5oEPjlO+jFPbuVn9he9s8yxUYjMu25+FCNrEHqd/GeOCPPywZBwO5z1D2mPOt/n5FW15WmR15Ipqq

tGeeYCexaO87K+uAwCuiHb1Hl09pkY73Xi2RWuzyAK
i6V7l0ooECQFrq6Z85leKM/tJyr+S/4zpIOdtDg4mUOcXYPlMYRyHIn8+a+LqJHAiebBR+ciu+drqw0Q

Edward/H558WF4TnwiBmAff+P3Gx9B2jdSBvKMU9iB2xa7XyqMtOZJFnqy77FwsjNG+02jqjHFkIn/N2VFu0

BWm8qjElAzMfmkY3qP4GTQD7vfBj1nrioww9lcq4Zi
vQcZHCa3vhwLQdfBFrMqno8aKsjWsybsal0X3f+SAasJYnmOy/Uevkl+aftn5Ich2ajEPgy5BCjeSFrZ

y11Xg3MZbL7RIAIFEGY6qkAmna9NFcsaXvZ/wyF7f69twhyah8ZJiByPbUdIgJbmB/VQeZkqWH/tAgNz

U969MGTJyGXRV7Xho9fRdgpbCYCTGZZ9isLnqKWYfU
qc9jdkFYxEUAv01IWystYpoDDVr9IkifLrJGi9X1H/LUizOmB0Q7bBgSE8xBWPwL3etavMscC4RT242G

oH859x75GJ1tXO+SEZ3idICxmLRAvgKnSGxe+MH7/itNomNG0keyb3qjt3Z5feln7x3U7IXoBe7dFhuk

ERS8raXtp/KaBVEOYnCJdnBpVN/8S0cq7M1Umf4fU1
BK7bvYb8DWJtRoqPP+mZ2xSjZoZ150ROXzoIOnI0HWr2fl4ywt6EodZKxitam3ivE2WEiBVsh7zo0fzI

HoRKSe9JeQrHtihBwPvtrCaL3TXG+UTS11uxlmjzwtNyhHNA1QIN8amWpYS6qK43/vNfLfk40/KFTRar

I6oGIEK05mOf5kzb6SwO+h3GjO7ZtN1cRsj/z7yR5P
65hhLmf8BmAdPUrq1ggqx3NvyrcLiBuApLhHsD0kDfaqOdWpZMDOvki0jNd5MH6z5jlBxKE1A9gs9pBD

iDPDI4yBH6fOn2ZHrYwxOEbKzIRFEDv8+u0KvLdEi9OHRVkFoHHQDWJ9Eg6xteIEs0U7zx4KodFMR0jc

FV7No6rX17Ay6m4qSgM6AHe9E10cOVi3Q6DgYs2K88
hoQld8MYqW2nyJEcEw11qBAkJz8UAkdbqtglKTwbyDdSmly/btFcMuJkeB3JQ4dqdavLGYC6r4qdmO4Q

Jg0IYn2zoR86U0KAH6zQ2N7y2q+ehNlU+a0su2l9B/nfsviIkosn3IqYNL9LC9mTJxowWTXiYGdkvBS3

ZojXt9wX+VgF+TvX/p4VBVCh4nQOXEIR+GAohSLCXu
nc5MijMUSpd030S2BpNo1Swt/l2YZjPeCg4qnuI65mZm86d8lPX9dndOwDd44+ZuK3tHbglx6iyDpANz

F00GmBmZTf3mzhzMqHRRLF+BqpAJ+agnq/0LgSVV3nfTSjoOy9YIxj34PMJC/gIHvwXB4bVCL7otsPrw

FK/4qvGTyeobbBUkeoe+V1Rf7SPFP9OSjHB9BVfY5H
L/Oul+Ll3mdU/y8WOEUofvbNGSty29qcJYxTv8rNHWNYAcxcvlot2eabJ4cw7HvlOm/Ru4gCidmk5Glx

Ak5doyOIhb5D4WxO6XON+ME+uNnYVKV/Af7nAC7PNtAGGVViBmcHe6+nIHA9Mv8Xqhd6XX3vHQhoILCp

wy0asDxtFJKsvQP7s/sS5MDl8yqbW0jOBOU6D13d4o
L61Qco9fuTKbAjw6WnpY+KbBapikcDx13JMdeKojpj1e9YNThBZ4NB+0rTWg+ZIPvZQNWW5eYQ5JlT9T

nUtFusxMqeaQo/K62c8qq71I5MtcVpsLHZ9S9S33h1kN2+YLG3Rpzy7mSJqQq/OQtL4qEL30+eD8W8cB

qkPvBBAGTB9rWh7EnHtRc/hryTrEC1zjILvaJEc3Rt
roU4kAn0yVIJEDk922J9GvlAVx+pUAiKB8SuWTvEuigB4I8jQpe8pXxC7DjRS/XjlSBIoonVJStpArr7

ZVFA9A+UXZI+gvbqAKnYWo3Ogf52HcpyZhH/7xU08XUhjF4/lBmX3+/qD5mnm9lSZwos3FR9OpANhsog

4iN1H7WxV/4BYIrL9660CEGNz3zVTaAx7TaMjcLUnx
3KQxeljW7HEsL3OsUmSeChYXXvmt3iI848jXbKk+5EdQMQV+zKot1Fmx6bkkqtTuoV58bjQbjWbnb4P4

yr7xgJdACmguglluQkcnf1LsDjw7gE8pz7DfcVLBas0QlCVQkTxwIQ7DTzU9KzGxfdiDlug4PtVZuNqA

FEOjaHSuN4BjsEt9f66oEn5AiGmZwl96AopyUgpQC+
PaiOHwpqXOjkin52/3sVJWA5vp8hoAm+0VbaI1uEevTVWknaPQODYssj7q8vRibdzzw8VTOQV2NCS3XY

RL0qJ0yt7bxXPIG5CDKdD36ne39GonYuJl9FSI7O8upuHzi6Fr3qfHtpTYuYx8vttTy+qE++9Z5w/jUC

PU3RYvqAmY0xAU/lkrn/f+3yGF/9/IgCra15ZBszAH
nQMj5yjUuIzQswgNY+mG86T414I8LKLdydkKJeEvihwX1uNy9MacAYOiKFGXstpZlI3sZoWnFuGsGH+V

8Wn3++Nb/Thy9KwCZPFnLVAro81dFtF3Ss8ulKsjzhCc3kWahjuNak1aPUfOm09CvLLjww2mBPw+Z+YH

BgRCYuqS3NAH5d013BwvCvyptlEQ6NUL5TS6ZwASPA
iflRm3g36k9zwzgPfcj5z/AZxCGiDO5o45zutCv/s8TZ0b4ecyv76aGdHlOCSgBnk/ex/jvH5qn6K0rT

4JSNsvwV2rW+OtIdCW094ISU74vTTTO1fWkahLE8dL501Ld20JzH3/JkFCF7y5WWBA2AB+25IFGWKe/q

3ajuJjWnLrHENy8Iv6U4Z9JeZb9pAxvI29ag0+i0VF
4ziEOhyV9ct3xciHEL6jTMDLFsJ0VkKshXuG4QhYyZx84df7oCunRyG178FdhRgiMl1N56toAfYm9GGr

/bi1/cf6uaPMV8BkkloSz8k8h23sgW685+hmA/Asw3yM5IBJERpPTV2hRe0pH0lN+DU+2ybw//Zoran+k

vmD5DT5kL0OeXQZizXiXGjJCb5MgKjzRnNV6Dl01+d
8X1v3aZbLSyWsd/v9jt2ZXzdNLS1R11+J156r3TrMtwqSuBQggrDtMAiHZiWsYoRxTlbias9Bhgup+mQ

WNU7c0PLtMb0mQRNaV5Ly2g9ZnT9x5ixqxCuy6OuLy338lmtNpzk7zXIBAxQlO3KnN/OEIdrFcXmuXpT

PobBu+ROVx4SHaO7CiDbOwG3BHxlTmZ+pUR7s2Kadm
qw0RiCvFDGwNMYY3V4hH4LFy9doSGen0B3hI0RuR5bOCpLWwFcxzHUWjSFjNch9H1Kki+l4GSKGyBfkK

KwpcNa6QzIRo7JvxRXvMaSmZoXhik/QfOAYRWSSrGLGNugSiaw7hvngr8jxF9k7u+AQxuqIW4YWIprvo

KDX5iuR0ngU+99d63N8qLdFjGf+MjSEz0SuDyUYoZR
KryUPeCNglkR838+FqHzGjocm0yODNeIrbrOstLJH/ovRELj5jYXO3IsmSaeZMQzG0xVxeifs/YbC2ZC

nA4sqxgofA9FO128LRSAyRy+r7FD/qd9oBLirMVrG6QzI/FRiR2Rpy2A6U/6p4GfHY0+oTxuHuC9hM6z

/LaToD1KFJFkpetTiWD1o9R63ersbzjq+P6z0Ocuy1
ZywW3id9K7BCkerwfgBjDtFkJzq2nf2Q3lbstNcbge+KukZERbCfxC58AEFx+M57+SreL3rQZHg+x/KR

qZz0i1NbxAb4njZ/dfzJbCFctTajvARv94XWSifmGKS4RYaB8F4a7onh5jsMv+kmpiMjnawffaML3AiF

396LOV3ogW8E6DMHJTQSGhiwzu33fLX615gFv+QzPp
VoRcFYb2kF3kZj3Lc8+S6hZRx5a9pM8/oiXxf8ZdMziU+5qnqN5WL4jqAIjZjL9t9w5a8XodBj/7Ra3m

DDLY7F7nJGAn5hQ6N6pxh6E9d4g62nklWy6f3u13gyb8RUj/+L9i/av2j/ov2L9i/av2j/ov2L9i/av2

j/ov2L9i/a/yK1t0XCv1Q/InsYG7KcfqC5b5B1MwWn
pZZvfvZxODdOkH7/Tr9IskwDKQQG2w9ih2M3qktO3Wtri003ir3PqKWc/4k6sLxkwTGlZfEwHMC6dTs+

xSXbl2FqDMx5DVB62BbS/8go8RcBQoA1T8o/Bf0r7O+a27H9tHZTnY25yYYcW8UuT7Bkl6boGPpyt/s6

GnhIALQMtiXynAVYYuXb2zyTiGfYDq/Gei6FUBFlLJ
WpOU1pN/x8BD9LICoavahAbMBuSOp86kT/qiYP8x07VVp/i8uHxMt+gS1m1aaKQp2X4wqfdA/mCVRW+9

VO7rXWNK98MHHp5mdCe/rU4j0K+05TqvcecLF/Dyg0+fdqsE4Mq0Xe4zt7rkmU+rNI4/mDE5Zoa3OZ2h

R9gj7PF2wam8DnFYQ5RAm5FSRHvkr1gmS/5TkRRdNl
Hgf1urfAzCC2DeDIkNDKxiTAll6QKrYHqMy8msLYADpIYMHM4iTdMKHTXrkTOXTDjOyX2PZS/y4UFdBW

TQJZkYSQSlFdQH49etxQFCqq6JVz85xbSQ4GbQ1wBWrI/CLals56Xf/SuWrQU/BLv0xbyR2sKwEBV7F2

m5kXxbF7x9yU7rmugucC2VxT2AiwvgGOe6ddkCd+Vo
o9il1w5BjhzgPKNG/CLwiNDs/PL3jeFY784dk2Ov2Kh7VQnlnAWpKQ51A5qbOXVDybfbG2P3ZTdZheVm

uKNbh8Mq8u0slxU+01ZoxQrr0nllwXSRhaxy/lL/iEal1abL1qCFIO2BxNS8pw1aJ61y1erAVa8YlGwx

0W5k4LoxO4PZ85m8URwZ3kYXflBmO+sXpNpF+BCuGs
McLmcVE6UpZ9zMmtckTs/Rl8ejTXESHYQJZock6BS+k5PlpbW9gb1ybPyZ0xKoItcps1t99uqsOp/ccQ

TGyvFK7jSKbkVKJ0jKtHeQT+fz6nu6aBAWfdRKGiiGz+bkcEjcmGrt1Pcc+eKe4smLx6omK4Lr2X4JZN

b8qmMSWBOmkaVcGQd6F2JesHoCBgGza58BAGmN8SoU
tmxnG20jcrKS29V7rIR0ZIE9bvZYS9TqYrD0uChuxggPRn8NWpn4A5pOgQ60ugJeju8UUt21lbcmv99y

NDHIfN13Ditlw4e5idT3O2rZyoZyohHEnB1rfHsdKrksTg1Ublf6ghHf4QGRRHMCcSxfqDpR2bnYzWvL

PUXffovf1p/yZ2dLgiMlqysJ9NDJLBgNV+ZGjd1sXv
859thmYu6hnkXjoT+Ma17yiaCKJMAA5o5rBIEgtMyBxuRQoJWY9gbeNMKWnBOkDqnBB62Fc+jUVFlnMb

9rqiCud7Wldy4StsF0J+yCREM3Lvs4bQ21B45bzmlakdBlpkw5v51EQ7/DEJGj6BWnqU2o31JnrVpo+W

+vOX+zm/GLiIQN15qXj0bsi5+KjKPR4VH/5p4vIGn0
5VbshIYTQAmMDlcnKCqj3OsStXxFPStsP1lMQjDcac5OPgH56LHpPDcU9UWhlmdTxrs/eZQFSjRc68ZB

2uGrH4Ipp5WsfwkRTYzdBc8khaeJ0lQ94vzjdw2qFJCfEKUCfKNnVDOQikiXNOr/UjSL0HiYKXGw2fqf

EfIpCgBtYzZY87n388j/VwRcV8PQQeCBjBWuKKRUy1
LP9UyybkB0xRk829mtvkVj8tQi/rEJe25nIzFUTQ6U2P3ztS3R3ud+rWcSz+CRkJSeD+5V4fifO9JAbO

vv9ozbMy3uS/3zo2nYW3ujV3XjkwFw1Rgf3sf1hgllA1EP3NgYmTN/kJppuR/0HitGwA4Tj4zt71iiod

Gro7vkyMBJ7m5+9c67LLZ6c66njyWG/ijGt5hR2Juo
ThNmzBGb+UH61DueyDhmGxczIoeULuZNomcOoxK0M9A8pX4f5u1S/Tz9exMtZYeyMHuQ3q0mN1ytoRie

rnL6Cw5anZCcjaNQoIq9IZZFcr+g4GqZtl9nnMnzfl/5rm0KaAGn5NR1M9WumrC9NYcVJV+VXH+2zLTz

zDRxf0fM1+wXj5pBfE9WNr0stIZdEl1iu4BGo/Ojmz
+TxL2lZWIjJ0Smn835hEQEy1JiiCIdFBk5IXeTu8x/rSFoD6r7KNFvH3026IIkgBJvdzsCWI77SyUnlt

2ztRcQqP1jH5v2TUKjPTDswykGldZdr0b9F0bvGwOu8bcqvRHDSWnrv7Fs89ZZJE8j96W4t6ovLVjEwM

Xqm9Oh9tTyc7lU3Wj3IZax5KRNBkIfhbAs2A03dHG4
ItDxcK4ScP6SAF+8V3fVB5rqA2pPq96JwP27EeCrbG1MBH5FMed55/wbg4hYK/CfnKvGpwPlGwCerWYu

HmXdfTS32EQ+A2yZSoSPbnkZJDdOAS7ae/VW2KhyZ7OVD7NpMMztl3Gra8PF+ffZ6vKS0wZol1bdpk7r

8FCOqyVR500dyE0jo1Viei4Z6a+Rl4IL1zGz3Jdpz0
RM/1YdJl9tcXaKK+e7XUFs0KSqDOpp4wuIgna+HpvmMP/yTJ6c5NxYjh2GCuSgu55fVWTsCJlwP3+SSP

CNHXj23vyKxl4JNwomjPZfdwSMAt4v11S58ABiuuBIxqjSo8YVozjPyCnRdVhWBQpfKQ9kEx8U7+5mAa

dWarVE0VF6cF405xV/DzRtU5FKi02j2L+5kgamwkK/
9q506tB5C1W6rNKr3XZ9JTwaZJeQq7mtHrmxKeqrMeqA8tKKg88UNzfXsRkR23ydjFR3fIGqarDtpI6n

8mqE0kjaRp2tt771q99kuVUD6X9l2RoEtWINo0HXamfLq0BI+jv93wsAVKC8BX+40a0yKpcI55W8AJ/2

a8okYu57rPakexNerygpwlo+KUc6QNKUnJxnEsefhc
sZcDOFNKnb4+Wa9wZ94SOQ3xDuTWULUm26uVxpaceoQ6f2WS86gK6S2liLinMAMUNeRL0jlD4utrSR76

joSBlkB0d1HDfCXfPGQKr/fmMHubcf571+KZrtZXVwWEWgJLcMC8P9Jd7+Rh2ohNYJ0qxerRzndKI01Z

4tLRAQke1ZxTVlNHmaB3e6Sk8v3oDvSmx/P+0N6MNJ
+ixUfFqRPOUHYqNvfRJz3rjS3bodZBT55QSIH5YADQjLkw5NkYgzE4Zk91ZJhNEcjIbqz1oi0xu/7Oui

Omux+PCzDK2gsigzRM/ilJhsv2wMbJY5QE9PYTWvhghyux2x3OjwuwbeqQVo0zv4p3vyFJOr5f3mWHP/

PjbONNyFGdanC7nt/Nd6W3qjjSOwmN/EZgDgpBMBtL
JYMkW57rSwjW0TEcD9WuCuFxQTf5M4yXq/1BCpV9h/rp9JCKsV7wH7A51FowR6Z/HcjvR/3Wd9LcO5N0

IPkugQBxuZDCPK/ylnC4BhRi3VdaUi4gdr6zBRql7YKtaRbiTdWCVFHrfWovNvmUxSPqYIi67HxTAfxK

mgDXk9+KRGEqVlLbxR/AP7k/0eQwOq6EPPWf3pMyKR
3Yx+w4XtTCf/Jn8EDD0GR/X1dyFz4usEHFDnS51Q7O1/esPpw02X965KGxjOVXq0fR0COKMd3x799U/4

pCIf6m4TLFkSdn6uy00a94vD1QQpGVDX+pQ1V+b2kStlPDTldL/qRv0nEB+e3ipnILk4BsTSSJ1ZlUSt

SEhrn+STQ3QeVDvOemWmmMXNqz4G5nvQpyhM3tUdMP
VbCSka+qBTTjIrDEfOldJnYnBLJJvmobJVzrZvrseKcF5d3pO39z41o3B0aEZz8PFlooEuRntW2dzhf9

/kxT4olRy/28+nlrQvLyvGzxMkTU5xpaPeiFqafXW2FxxkVARZzYlpYkC2oqhifkdw9Ai+dlK3+LcUpe

l5XPDry5pFFvHQUWGD+AbjUmJWUtVQ7uZKzNxWr9Pc
B8zToOpV1zagKLSx9YNU5t9vsqJCbQ7qjM+L9y9ykEbtAJb5tVaC1uPmhWwNH+m+P1MTSQNgBSvx+TZQ

phf1gJQF8F2KpxbzAea9/njicPlxqxOPzjFV9WoYvSpUtuHtBMZUbjuClQIoTz4lcOxNVu41Z4NzvIwK

nTW6TaU6w5dS6J9IXVP3kKkL1whNA9lbQjGqN5qkav
HbzjSnkgFa+4FEw3cb4yfRfNjmQAXK1HCTCIOqI6uEYHICEg7bf84P9KDxl7xOgvFtQ/HU0FEaHusRvu

hk+K3MQ/oj2zn39+DVrxqmzzuS85nxNUmfigsGov8XX1c7OduCnJP98j08kzz1pbMmvpD/9eBbNqpygl

PijdfKWSJjm7LguWDuaq1BUE+g4m81UZpD2nj9ahSx
vBqdobsJNBGbR7zUs1e7IULEnb4FuxwOWVkkH7rVinK4TwGC7QhpP//Oacc5/u470z/1b3dZl7jFIM78

wo8pwUwWCZn68+TObzSpDD2ahfGRKQchlOIRTWDOffhjrhWm92L3Qo9upfGy86+tRp0tUYdmq2Or2c/I

UxKe1qcmvIbuefcj3p9roBGuE9WV0jeuNzbQrJ5VwZ
9+h6hqvvxKB3U71HfL0pDN0lf29YK8Z2CfajE1B7MnbYvV2uMZPHpXqznqCJGB1O/ilXVU0rjsCGtgaP

Lt03uvyBt1nBTajsrOYK8TlvH4pZZdbWSSYh8sVkcWhIlLX15HnB2oSDviRFZ9lv6YWl7H1MWt4jfEt6

WdHmFkp7j6GP7iH/SIRpe88YDD3/KG0JBCg8uJQPzS
6/HOKknXmWFBsPi7DjQ+PdQvRnFmHYvnGLWrF/gY/t6MhdglO28exTmn4xkhOEdB3tbOaFsMrdIw/KfQ

U+XATACdJ+UdFE+u8uNtUuW9dPljBAU949ve6dt02mmOd7oSfsjMJC9SHthHmYkAtb8Ov3IDjDD5u2+k

yMs9ftHURoELirHCnQBu31/F23/J2DbDHChTxW/eaE
1j2SWkAPN4wA14bY0rXNN1oxWvNUwFN/rZQy65QcyOqgRgTfj11Fl11svbdPAWqJthHx6FGO7UBLXX55

lx9wh1beC2GcL6NdidFTSE3pT6pUisti8WoNm7XPb9V8VxtVJV6obIDfUeD7Obax4HCVT50HyhCVOlrW

D2cVtb50McCdl+QptGPBJIJ4Ds8qIhrdFfGNPzwxwD
TKfblmDW5L6Wca3qSQq+aboA1NAKzyT015lj7PVuBY2WWFliBI2ucoxPHN3QIUZ46JzmT4ET4JIw8WH+

uz5LgLGWxIPr+ZZLXp/kJed53Duka2xCAQa2ykLkUVyJ33QnZCjKt4ESehvcrLdS5lxYRj4HE7GhFQe2

lfkB8ROz4rpk31ipR7ELrOuqXWi65f2Js8jYJLNBk+
vPs8u6FybAPjV51DCEVX/7L9CqRnGurRHeG3FexdmZXVyZsEiTC1J5pB1aWTdHel5N+HVxHylRelTIvv

l6RtuRmqfWiHxN5IkF1nXIMmlD2EDU5vTp5mqP3J1V1Ed3p0sq9leCsQdOSvGtRk4jtACjzpB55WQR5U

l9fgOf0AmWusJsj/1pZMvCWXfthIzS3VrZmyO05+Xg
7JW2orDHJWlx5yxmP9XCeH5xftvTlE3EX4wlEfaXy4QEPseWgoL/OXfzjahxwK/fcUviAzuNtL+r7nBe

jIAdYTodeDzBFNjPEXg/qHRzJ5U3a02zxtRM+nxDzDJ8N22wiKOgVZkd+FiNme7n5xv7R22LvezteFg9

cNm8ZT8grGSanPzfmYpPuB6k0BGPzg83J4bgz0bAsh
1b63aSeaszFIoz3h1ynC0Vpd9o43q4nuW+Y7Q/o7GWRKYIYf7MzTZ0G4Nq4zwX3Had3sczu6CuxgGRqo

0xJO8UKvz86cf5WIUSRqpQw6lAkQpMtdFkb6jePkZJWp/UhIKzw+UERHYKMrJX53gNjfn3kWyo6srYkY

D1/ydKOnXFXdekq+yENcajOGPPttxowf99SQ7vnq6/
/TtWJnk3Z1HriNH6+DEllQQny8DMo0N21thJo/9mQToOtzK3ka5j1gEF+8WYD8fLuJQ3QYpSWCLKPbXD

TtV9JPNpafW4Kn5goXKVj+/+rfFaXKq+/50fi/ZE97H2Mg4n3D/zLbFfZgvRkI5/RbRpWPgcmkHXrsFb

4RDjkXOdgGYsDcH9o5T/68i3bVM72tcQoWON6kt4ie
1XuTfaBLzjg1uMjdYclHZwIwrFzpiPmi0ZiwfBJjcTYjQ4FU6qBKF3GdR7Eum8cH3n0ZRSxCIRk5rxh1

8rAnJo5en4/dNFrap+whvr66CawfYoLT/SVzebR6boy2j2GfHrnzM9kvdw3rREKYo8xYB30xmhHjOqTZ

RZfKPnZydqADNmlQXEpz2j5d/d9NDYq9/QrADo49fa
MnsPLvRjg4+XoCmjBtg76qIFUhzryeC8R1qv7NeLjIeXd8/mr/28/coQsJzZWHIqEKGT8vRg10aUDDiP

FwGQMjy3OZ1bW5PTnBlJXUMPiU9f7z2Dk/9+h4wg7KeQmU/SfkjR2X4peaLqfFEdklvcp8DvtI10G5/l

A3sm4VOZfWo5IugdkntGBipwvlANFO0tAQPWeIl3Ay
xaKWgcTAzs92T1H4gQt52m6s+ygU6DdLxukrHzt8Zl62LRX906aMbDFHUTvUG1o7Jm6hHkvGWJBo4bJ+

ynTRKjq/M5Iy3mN+YZpuiCZjgAieDHaQWZcCC89Ds5nE8+WCLDr2suZrfWINqkfbbr0pq5s2JA7tUxtq

yXCxA6ZmkChtwQKnP+sMJeciLjg8i04QG6c28lKq+k
kdpnsuMbkuvfV2qI4IY5tdqxXbAV6qxWfUU/sGPPL4bSflJmng9iopgF/1acQ8vy2LSYaPpLA5gML3e5

cAoZ/8s0fEXcWlmaC5ukve5N2uE+J2uvfQ1Oy7WyCuzH0W3CgJ77jd7rBJ+prrzbiNEZuFTtyoKCYKJC

KIk6gzNaOjwlg2dO3RLzVFN36fAxo/12h79yXJ8dVE
cKvRsYkLWcL9w7sbhRqGCKPW3SotgcVgWKaxNO+Qz25kxaESRlmcnQ7lUG03NfN2/b4u7HxyxsrlXe2Q

v51V9kLCy7qQjwLdQeZOeLIwDvZ9YC/Z700QEPLFv3XZcy1gOWPFkKpW/N7/Htt5HICYGIFDZHSPZ3k0

Xg5jqCETmTkcyhfXHZzlPKmLEn1mKJRbFeN3iZn6ol
7nwneB8A8+nWvQ+/g9yj0cTbNfP6cgptzQlhxqIWhP1IUuM7fJ++0i1R55ldHTLUNie7ImHSFsDkZ5/w

3GdQeprc/XR0e6hRycNtfRMTis4e+L27iXHYsMmNrOVyvNtaZk8ltMqTcp6/oT2u68iJsirP7aBk1oM9

4cc+XjHXgtcZijIaHoc+mbseCmVl9wafl+bjs/XWVm
kZ0h19ZK4YbIibcwMfkKt1eJhxt2NqTOWAIj54QHrNP9pL413iJ7ps525/6XHXYYuFLUM0Z0yb7JjJEC

sCYNsXwAMPhH+Z+vCrcfV9xv6TZYlLdPGLDW2kR4Lkn+vtD9JC2N1dfwsONFt/7im8dIYkyXc2guV3JT

gyWWhdhBpQY1yPMZxBUFfHI8ZEd2HiFBhYo/KiCbvO
mWKHZgV7xTmJ3LJLkdALd2fPj1G5n0/i41ZTbG7TcegnWyXaECRcbrO2LOtzmaBC+Aqq3e2ea0qD71Gu

SxuYraBotIex4Y06cQhP1znv8eDfjCnTej12rd75/j9CrhyOzZfjrXpmG7p1qaTMcOlspY8vQW5xZBNn

DXKLI0JaOC23rtZXpjvU7KWgKjoWHieLw/KBX4fSa1
ZfWLH7vu6uF51yeVHUl93SmT7LLmH5Gnx4UcYUOiO+eqOz7ej/r2Db+dVImLICUkslhTCOr/EG7ba/Xr

Q318L2Oo9bIsEZ/fvaT7WEWxzVna/RiEQ58oktRjKwdkSZ1QZwtLVpFJ6tvriMDWTjAWYLBZfusoeJHs

R7I06Zzc1Uffdke0xze8C9vlNQ+K0/spbKuU/K5ELC
2pD6wJxW3N1pmnFhoTA/cP6U9BZs7sWbFW1QOe2GU46LxLEuM7LJqHo/I8iEVyB9AHUMVWthQJpu6c93

zpheSlNnI8Mwmo5yw46qs7zk3irXo3e+eFq+gTBQM7sBqdXg0jaLhMR+++5VoE0MZerBBmbP38Gok4+4

XnE6G2TpgS3UuyjUAHGLotuvC+GElxlKQy/4Hc0gVR
706lEM8vWE1ppAuSxk30OBStTG7jBfYarnXvWaU6odPz8uImG4Kb+gYJ72myYA3ugF3///kTRh4JBxBE

AfHC24bmnKQ2usrBP6/xVDYt/XRPRtSGTvYOBk6rg9Gp0WxAcuBgnHmH1IX40Y9vy8Tk2NN0crVyJU/E

bOAZicVe4H/siaxnBEcXw7HVUzOr63QDKJQFTWA95B
JVYFjPPfuUqz+Zrw6TjKrzHZOvpvUEy7TtbDpj86t8r+baA48CZXU8KpwFy+UpCGfODA/R86rCE/uEQB

j18VQIQM7qORU/xt4upSEafz4rJnciwV1/wOi9T4oqmTvrFxgxR296emH28BCrGenc/l/gOhAlC6W0/J

9NEVYa43+UnJU6IyTQ1+GDdYcJxDRc2m+8ZubcFuV9
jJwWXyluere096TudB17s1jbUNYB50OO9l8i76cgdkU3/NIWXpA6h82WQrnGjzewQPclZYKGx+OiMHW2

p99qCK7bnM4klhGtOkZKt4YIU7U1ipDCkCzmOxG8ArWjsHWA8Bb6zGCK+TUNmKdlj7h9pKNrKlX1Xvr3

UZrjdQMDR/ngzBOoHPxDeulET1i1nUFcHbKTMZqqAc
kbzo4er1L0CKw63Dif7sfNfGcchMElEuL/InV7j3yR/4ju/+KF/w7H8gZy9/0UGp+Fm65/cljJnX+yFd

hO6V+tlbWxbDmzhJsKDtMxhL3XXh800XHSP1XGctIsQRIXHF3bRXEaaXJYq1cBq+Va5w6/fwHqJRoKCE

XKYl+ZbTEFoCM6xsKEQ74LuzRIurtexn1xttQ2OA1b
19BbDuSYVP+WZ8EupiJVbILfAsbX/L+elFspNiduJBHNqJ2PIYn+hVGsPDBt2oUV0g2pegP5Bx1WCBMj

FLLHk8+d9Nc1QSTpfCcV4vV6GPyYAphP8fQbVd+pwo9ylTFRHLywra6Wib6rH8kUfTMvdmKVnfovsAa2

hC669Iov9NKsyj+nD/XR56vktb2jUX0RNDIwBESbxq
EMhuP2fYbkTQVJtRMlJITxXU+ufiO1agquSVX+1SaWIALqPWP+hNjyvvqa8dTw4EC55YSK6G8jDJAa+c

NXP20HQ/u3+vwbY3Go+47TjA8558wB5pnvp2sVQp+tLjxWbdgd9O+58sB2Eu1qgWj3f6WGNoqI86c7ar

nXurhnvpjJet9o122o1oAr6/SXIFKr4iiJk26UXR9b
mbUYET+jvtnwr7s4w2hmO4Yt1KpMW4AsCSclKlc1n8R9lXNIFnPmQSYVKkO+aeqF4kGA6ngw6EIswajD

UzWUeuKHka0S77YmcWYiQAD+VNAAZiBhkUj4gAoizXfEgC4XaqiJYG3I3jmZTJ5meSq8Np0nN8DJuHGq

oEyFwS0CPV+++Ehym8HfvHHZ1ktw5RxlJblRhEQtf/
kpHP4BFoKzUxMkfPpqOBG/LQjWGoJnra6ChdyZoqtMCRxueDt8dlQ0fB7Apr920JCXOkmuNgVAOqpeN8

/9d6RjeeSuil+qPe7H6Nzq5eBUwqha1/dh8sXrjNIJJ2/vUeFnNxy+mgDC7Ikkbe29AFmkPIshu86aEb

aWVPyT34FSIAfXM80SUtx12pRR3mWaul3kX0Pa+vV8
8CcZ65EMUE7dbvf5cjolY25u79ChjQ458xfkVaqwdR6VWIU8tpBohmehvLzimYgfuHotZqxQ8NA0AqBB

dZeawjfLGo0cwMgc3FzkaVnLlIvmHWn7pXO9sLo5S21MWHoE9F3LurEWk3PKnAiG064X/Q2GtfjJVOad

4MURcIuQae8Wg3/wTvxqCAMuY6J+zh+eJj6t95UcwN
zMI1l9tm19Z9m4zZZuONThA/6SLD6aTRGEUAtNFrvA2cOKFboD0dlLcVT3Q09FXfeEe7Z0IWLdkqCKqM

4WM4plixS2aHGcllBCP2LJlsb2DsyheFEj67vmlNIOctWv18NGiRwSEByXfoGixzbLR0SnUQ6lO5Disb

LO4aPQEl+qHfyompLDK95k1xfXLM8yAe+jwEJ3BqFl
d+dyDWLlFcTnFGK/zCyu0/7bmba9G3wYHcRrqxAHhcM0NcFHd1vIoZQf1glnnS7WaAIVX3PVpfyjAt39

VC6EKriDD3c+DqTbtKZ1lk2Qreq2ZlgzPzKTBO+WRT/DBX08Yv1M+WwhwjiaVdea7WVD8jwpcgRT4x6Y

5rr6QjWSVGnxjVOcL50wFTU3zBAX+sTsd2hgq0zy41
A5MAfdX/PmnoiRbzI7Pjvaegg+BG0ZE+i/up4HsDi/L8KRQ/fI2d85DRkwz/Fz3MMwURH22zlHLqL9LF

hw0VfYThlEH5BRGpmCQG8PX2uqbNED0l07KhU6c/1MbWjSaZ0TELE1Hm8JUitYdfZ+6B7hluAea/P5wU

Dl7sYKKex4ePTFUbVi+yMDrbuo2QXnp+b2XwivZghI
wakvOfE9eW0pCwIbOzzMuBpkx7i3L9II7J5TYROZs29hkK0wayVkpety3dnhAmtB42HDzDQE+OqEVk6e

g59BhmNxHdHmrffz04ryS6zIExKQikTY/LTIgn4MC0b+EIPmtcTr5OY/ZXXSOeLiqili0cI0H0yioI2X

RlR/xuTrSNbUPslYvAWQcHcSF9ED4cuku+v2p50rkD
/azPK9lhWezr8mBLwSS/bMVmjVVodPu/vwk7KYnjm1jhYmWnqbd0nBv3irAqL4p0B86gahRVTMz3a2Hc

Wg5lh/erdhFuJB7qK1l02cZer6e8R/e4a8ltw3btNG0AZSE8vcOWS3CXEuLg4ISIVOISFYc8WfZlDzht

RTxRZtiTsixJHu9x3IYOABm4nPQQgBCpYU5uV3P9dt
Jp51sDho8krPzR5aC9pka/gzAoO00pvzFMvDURjIfgVHHcKvX+oNCZ19Eyx3jrrm//e4pxTyYFux/2J/

0Xw/4ZaNEap9z3cyQ8bCEDU2pMeQH9PJYetrST3Oc109OHExqS7HzkE2NBUhPI09yMKqPuEdZgfGsCjZ

vAafvfN6muW9t4rnfuyLHp1hD/M9vZDlOoWxNC3Ru1
h7QaFQvFDXCD2+RGxPFB55wRIrjXyev0RzBJE/smwvgZdpujCUk3VQwjY2NY9Z/svjiU8q/P3YC7CsXr

u5u4d3fFu3TpjFJEqwPG84g4GBHw9M4e+/N4Jjyf87P+HoAFky1dHyy3ay7BN3JGpqd9XH+JjFfNxWBq

472WxrS8EQP7hRvpJDCrmyVDuTzFEeZFuSqQkrgRvK
SmGIVM+UKvm/4CBalsCso1XaS0M/IyRrm96DwPii6Bv7K1HBWiCRD1VV8/p0tSwZfDjtr8g+P/wKXI8s

qG8SJAn8B9OfIxsVn+/UOtaE15/mFIIRBcn9kRuCEUt7Rxd9Ws0bs1LaS8ImLrnUx26gwIW7rIzNL+DD

T37nIolMOZ5hy+webXpKVLqU4/Blec0MEkLtGc2bFA
Pd93U+CQ31FOi0SHj++mw9dBIWwX1qGA8UVtE9ivkOGBc07EUmfPUoXq3X/MrrbNCEBVH55d4MYPvqBY

m+5lBzjVKVG6HzcccqU7e3UgdB11RJ60RcVEhE7Z+LmxKE9HbN5BCoJEf2FnGTAv8+zfU67X1bZTBaxE

52m4/ra8P3mhNj6RC4P6yh9vDsrWBzqI5AiHUz2Aih
gs01cUIW4GJ3p4iruubWagz9FL72RTZOEFYU8MljpWu23FphVF+WqESqSct3lTV/FMG1FkwOcbMxkQrZ

4ihac/Laa14snsvdMf0wxjYPxcbkNvIRZ+FyulfX1BuTAkfGL3oONQsHV11TCYVn4DX7Jxu/RHMYOEam

mrB3fJOK8S/N70MVyxn5Wy90v79U2hc5keNApBPSV7
Vf0kTq/svGVqVCiyGDhR5mLTQuqL55U7AAXyis4V61yhVF9h8USDDSTiPLke2gdz2DJ3dhmB/e60dEZX

IaO04lfToAO5DbGXpxdX36i6dyJODhOlWNtAm1d0XUPYO242nSB0f+uYheuG4uqkHE6ruLec7gbJaruf

u7zzwbSYolLCc7gtCnu6yzWoBnYvwxMB2qyqUCSY5I
3D6LOIl2YexvxzvbdT7ZsV1+hTY5wmHbR4gNbmOez9y3Y6FtkiNTw0/5xjfvCvmt4Gzhd/alFhZSYmga

zeSeNxbGTm5mUyPk9nsgXSX9NjFTcrSR9WiyfZPTPafCGdrDBeX75Sw+4QTHpDEZ6qVZEW4JfUZKz8wd

lsRfv5u3tgRGDqwhcX05UC4j2+UrNZcqMds1eoRhNW
6hO97AvnJuxm8MWjBTaFnhtpyaX713A/VQmglYZtzNMujL+/PcRIS9gUqZ7O+mb5O+DVFfhuXvTfLD29

U4mMg3g5578rV7IMh1vZCIkj6w7NCgpYhFzO24+5P4HNer0V7P0R610i/qfqxLX48BsPn273NcTbkNUV

gY6yCIZlSq3SfEPslDUAn4PO3hg4WnmA4Rbp2wx3gl
p2kN3EchqLsD/KaXxS8piXrQSbSlj2MmnvnNpOYgDy/mW80oHgE5c/mHv6qzp9tqh4E9bRSXdR+O8IwF

YVAFfxDbeajELZUEJyXmijH74ZUPkvSNQbMY5Ya8VzXWhZSSjBuEQaQ/93SKO0c0ZSOb+GGsEkuiatgM

tgW/MSFaSjqqLwbJdjaYqm+k5EC4QZpLFXhteTyj/e
2Vd4y3AG6RYueieby+G6zhdttzFQ+k9aYaZnlREcrJdYseEQdltI44H8CPeY/5oRCFh8N6VMMqNrTrGC

KHPAddb/oKhCfmNoXhJCrDaK99iAPyVVzDr3emRchXOEx00c87TKONLKc8ijwaBTFv7VIVCfAbv0ucIT

mE0hzrlyGg/VKnsKo8tsNq46pomT076rjvrRoV6y4L
Mf9GioSC5N0VPZmW9NKMrOtitjihzKTt26laF9NEjWuaoNSWGLRKvwc7mlaTKf+Cna2gVr3LI5vUaLuo

vix4wb3JNwAj9iD3jr562WJDBb5uDvpBNwpU8ZeAsX65KoqtgcPJcBmwvAHkSbxzC/8nEvdncwgwUb4u

Wq80j+oBuH7VBBQPEqhlOBpiGyHOK8+jbfMa2X+bTd
KqDj2w/8qheYHpH11uInWS3mIlPRUkjA9+XdH8hAtKLVQ61YyCvbka8x/RtqElucxEupYzlybDQTsefP

jczK2gs0E4kJY7ozshKGNDyq3uWlPf8FdBu025l+jJrjvBaBWOfC6641K/N2sPMyBa1duRMjI9iMiI1s

OBJIQxJedZlHvoW+ENqa1COh7OrQf3WTQ4Rrw4irz4
IOodIm/XaLNFEI9Qpfo07oJbdGGHS74lKAvgu3h8v4PiojoLuGdXbZ/i0lLj4hAjkxBROcmzL3thvqQg

Avh6P8ItK70gxnrevKKuSjKF/BKtrSisKKuWlsp5gHnEStLGUgA+2nq2DtLL05FEySs4Qeo8IK/qfGAM

WQCLXbpcpfgP4tKbhQAJqXq9jiXvV+0Kxa++epq83c
PkFaZX/wOxAQ9vV65Ym8NcZpucssH860NS6WnnCFhLC9zzGVxjwkrCAeWegSI0CbzA7btW17uW3Za2Hn

Wxko3DO4CPFHeyZ9AbuReSyfxZF1wBYCLe5Pza9bf4wQIWR6LK8ChCPYHOzKNYRzNz6UnRWwtS+Z2Wqv

BKpfND3w6fzTNy+AP7sW9WNkVYDoiJ2TU38ZeuiCAk
8Si5pPFPYCqTiY0pyWk4jcgUSuXvz3tiacC1aFWoKE1mmK7dR6vQyyiF+x0QPBIb8t0wo6Qd/bcbebUj

FpHIx8fjzyGmnXNJJY+OXxX17tlmlaLrBe32exUFJD+H/es1xGKhS/vSP+qWni/E1JQjLkZu+MHO9NnF

MLLzbbwcw+r64KavX2P51lb8XU0IqitctxSoJmient
MWVHmtBYbsOEl9rLvDXM2jWzPVbXTXTX+QhW5QTYS6Np9nsRT1AKwAOiagG6e3ilcyrm7wdX1HMBaAuk

e3kGJ/967Al5SJqC6VtQKxA5dHXkCj/cWC9bBd64mmzKihppj6Gq9JANse+WxAQeBDVwOf1V08HxXa/2

99ac1v4cfagvfIIFfB05Fe4iHAQgy2pr2hntD0Xx/L
NLaueORLyUK/G/Bdb//aOvo5kibrwFFAluCbjxipCkU2tl5RDEaLw7U1MJoTOyP4oWgW6QxDJKwkKPtr

JhnA+31rxRiJzQP3+DOwtfD/c1zn5a4B1sOwE1Zyu5DSK/zV1+OHSEYyLBwlZe0QaPjp9VBYIfLoopOh

rrVLdk2jh+U2NvN76nJYZgo7MFpipPh2Mq/BgHkcJX
hK9O6QAsaO3umQBuuMWz+dLlVQNss2Nhm53rOHFV/879PY09jN3KgC6Wo9uYyVtTDAYPSzGiSnMfww1K

VqorARxNkfDbWomb2sx20GtA8tAHsPTFNYE69UsARXUcWjVr7EM9G9CqX+/W2ZkCeFoSY1ki13gFdRVO

e0Apv3mWWPlyiV1lwuJA3vxzKRbfH0/+0tFHFmaNnu
U0jRD6pjG6pfhQ8328Jb74o2blcy1R0pxh/2rX0VHB6K+IqesBPwSQytrS7zwVpJdeJ1ScuEz09hrYpM

Br1TdD/vkn4Bn5GCScE2KQtb0AlRiDyZ3ldHgy6CLt4I8qkPc/zBF5N/uKCCvyZuezNODf4iwdgchEMJ

9rfK4OJDGZQqvxZKxqGsfTBHJd2yu27ThizQ3ib4B1
jo2GpQ8J/9ueEieh5OZO7TzQu9UukvBVYcI8X6OHsOx+spFspyh3ABLYJGOUp5gnRUuhrUD3xwrDv0Mm

hRw5WMvsCyMbF/gjmYbkQNeTsfpQ8liZfzTslxwISvseyClMlTpm7FyAd3DCefRC+TjYFNc564rc4023

BPHDTjymCLsNqGGeUhLe6kErSufUyvDooof0Gwt0rv
EyVP7VWimQWKuEN178+dudrMdTtp6xQWw2izFxYd7sSO0C5sJJ8Dad9CpL26kToITf0BJR4PvlJ0+hKv

1M3gt0tq0ruhNU4gg8HkQNncPeyXihRd8ypBSNjXCZHdC/37s3PvpLi/r4RIzrELB2kJdk0p4ULpUcqP

ycjzK8692tUYD+wcRAzqHEvOPHyZwgsEpur2yQFDP8
+ayhrm+blCUQIYH0H+ozn2vuJjDPpToxae/1bFDCoc3WY4mCMDdqbtvg3sRiVm3AcaqD41ljkVg/YACF

ulOgmITMzo8ifRLdVa4ye4/JmpEL149rRcuTrVczAHXDpMgFKGuqJz8D+q4A92iJKUDezows7dlo7qw5

jr9Mt0ph2QMq3+B+Jvdkd5z2aQ1Qp6OGZHFTmFIUBP
NLWjI915gmO2LkiAMOD18WxpKNMnXMRPV/6+khUjLXFeHizhRGvbliaFZ5KpciS4nn0be6owrrn5GlXx

Igs5E4yint3q/52SJuWpH+FS+SJz4shuHFigntTEj2BWlY39kzge+m2apUG/6iArRSXOJ9sJkUj55NJV

18UTLmPtuDspFfT4BX8CIAkAnxRSVmgqmZoEUBj8BS
sknQ52Vk5sqBxrMT+GZWMT87EFRUIbS3N1d6b9kia4Xe4mBGdedbW43dA/ZPEAsqtMoG8g7oNMVhn3te

oN8VjsfD0Zg1lsn0dvuS1dPvk2vJ1QLc/RP9ijUND+WynC2YqpJaaWIc0IH/2T0A3S5Vy4feXRRYsP08

it4oMehIlrWX55/AC56Rtom/BLqPw0hWuF6i1ddwxJ
P0gWfDAwztDClFpzIVTB/T6lodDh1gKrH1HcK3GHU3V8ni/22mtMnIlwPHZqMB/wKB12wDT4LrPEpOGX

3/fm/3VNKRf3M1zlL/8zBaSXCdBOwBMfOCrmcBk5bSKVkR9aqVf7Ni0j81end7p6R8v2ogtGG6Lou7x6

vnr/xhCJL+wh2swnzvoG6kV7lOR0pNsFi2kum15fn4
xwok/+WdXVnYS6VDWZ3VkpQiRCNmAc5P4CwV108hh1xBKkRufd8ySnGF6dEVfBBv8PrJuZ/Z5RCcSKev

zJBTXh4PQTTKVrYTZCsOGdgMeCy1pYbYkFZBp9f/BcrfzWuSSvUhNm3nzmzly5vhkgs6HQm/9diflUvy

JDXYFKnx6bOUlF5U/cEa65Xfcmk359ZXJXxqlOOSGW
SQz22fJrtBrCdolKjVr+5Gf1I3cr98iHa7rFHpmkS0j+akGMFYzF9VF63PFn4CLhr0BNFrJJDbdhgxm6

m3UX6GHM3H7PtGCCKQ7eCzQKNaKo1tvMrkhCSOWCSrHikwbXO1kj/amsWCuhVNIClOQgBLF5V3dgPi20

UGgnRA95/jTAB7p72gF7tR+OG6elBTbvls6lhrWm7B
/cwoy0hNWj612CGWGNi2KbauYgec+6ZFPm5Gwx4f0SaNdzEnANt8GxWGa/25U5sO44aiPZ1kJeRzyRjy

EXfR1SMZbByuRIxaMsiEHL717yQwSvPMHn2ltppK0IURUB+PPNQYLCzXb3qkP5jmJFA114wAVfCYON/1

gr1T+aXYh6lo7zwy+5pUnQO7LvAV+W9IJwJcHKv2bO
IFNwET2Aj0fhvm73lkEZdaBJIYGyrj3luycKTfzQRblfhnguO0dpcRjEvzbFcihse1DyNxgyUMEjL7by

0yMNdFTXyn+Qq/4pvDSu706g/hPLDdR//2rBmDAEU/d6+OX84Q7wuoWRmhcBI+uzoePVX2oN5Q0A2hQ5

LDRo5P60hmP2Ey08SobGTV4FHzG47B6FiAs3+6bvST
GP6NJCRSe3mikFx7KamYQfiDSK1c/Xl7sEk0arx96CREQkLe10c2sAeSqTfwazbKwgTTZ9nZSNE4W4ER

LvKr3f8uvhd4MwOGOg3tlXe9S9eYREyCFTIG5245MGNnuhx0zixNOjRVZLl/jJp21tQncXjVtBmrJHsf

G87QrzfviR2GXKUrB0vhc1ZHcQomEcNQDrevG5Vx9Z
ofpkAYA1qImkNI4AgBsaujNdABfh3Sr6zAPQDEbkM2uPL/O2QSNqZhYgxOODJkIu7ekY0vJBqSMmCyum

m5tKf/h5dKHY5hcs2s1rfmhEhk/JUP57uDlquQWdj5ASY0Rhjskh69KFPXBf/a9clOQWcLIIp5lp42lD

TKo6S+FLbUQD/c/UWV2kC1+FsAYpWlEqkZQTaHjNZw
DNsFCxCUX0+EbgomlaDKEKvsqi/Po27e3PZf2I8Vg8B9y9p2lxpraURDWutjxlU5iw1dg/53qIk8GTyy

2gPhlXvP35hK7G+LLJYL/kWvJvKOzPMmmPqXUowqaRMw4fd3QaBLVzck5Nup928Uy31975HctANEUwYZ

LLNemQ97vqeZInlFqOso9ziIusyrOuOSs2s8ZqXPqf
f+bWXF+tsdACiF9kYFtkjzgqBw5nUE5Yv7t6GluAx1+3L7Qlq8Hmf+w3gXQ5Pj+2DUabEFez3cMJr0Zp

0zHcdhjFbdQVXdaEQDJ9ODn4bu8pGs5RSkLpbhe5bKjmt3lIkizH5WYw8Cr+SHx9YPlskm7ouWLwY7/9

tmLUthJVI7OCprp9TUmKg9/nGxj847e92xJKB/TZcF
zvA3pfY+gLHNNhvtfL73EhV0DKuUePx/FyQ6b/QZwgLdB7Vfs5v8Dm9cqJhwuB/Ub+SusFZzTuxj4wb1

YK08k9qM5h24clJqK9kPfBBN+J44OcWbfRhcJ41nHzTMfxZGWXN413Bb5uyK+ww27yp9Je7SPuUcaZTq

2LhS1U5uWB0g1ce+4nFVNQUxnuGF7wsIsz2Pk1zWsH
A49WFMdrJBEp8L6gZfopUN7lfSGu71cRDGv0T0hIi/fHrvnCX0qu1/XfO6+Ijzc7komb8F/idAzG6mii

eKn3kLFsBgS52rsLcE+Ea/2buWZqnuO+XLXnmknW6aHPthSO7OU/vB3MmVhvz0Z7YqQpmFYVqpT29+Gp

8CrrfjNJn1JmPikyy/CaukUcpqgINxjs2EC/eL+T6k
hjKGYHPJNqRm11Gf0xKa5a4oha/g91pf++SKwpa0ACaj4+fYxqtuRTSbZzvZZEHvF82RdqQITIVr/l2T

9MkFxzNkX0l5GKb5aTK9tRkneShzPNMH1VRZ9sHPNnVre0CrdEn061B65cDKP0d+30wQ1HXLebBhotpZ

670UVHRzjIE07nY+lyNAnmq0GdNMQ/wvekK6EUMKYk
PW9UaG40Myj/qRT+b8xrT3bHAGGRAJ38oxHoaQ680m+geU0Z86y9xs3cuzb4hM/FLy9j8qsbyhs4HCIw

ZJrxI/zV/SODEcjdnuKHJ20efi3u8cw35yaBp5/9BdhIKTw4VqT9ZIWPHHm7GH/8IgI6WqkYT1y+PSzA

aHvo/o/Zsw7bv9x7s+lW2JkQN+UmJikS75wB7v+m/+
qGDG/EpEU7MIkZSXLPAGqzLpGAPYtfUNco0oywYEWLTqHgn5M32t1zpycnCFCL+2ZSuMmXGpaQwd8K7o

TiXqN3MIsEViE61RCZkhu2ANU2s3HoAckQB0pN2uAZFaEJWxh9krH49LnE7378ZpwOjwZSvUJr+OS0L7

HxD0KvPUK9Z2vwUzmPz3ZOoMQKjUkRsQcQZSof4X8p
BmqqLnLkwiDFhbwoWIbKWzTkGSbEJyJPngeTfAdZU7YVZRFuhOTGXRJ99y0GM2tzqxkOck2V17OC7JyF

VGD85I9BS3VwDw6F/dMUmeA1zavMEuqQmXOMEUOlwfb23reN55rhRRgUdIc5Uz4j6DlayqPjK0Y4O2U/

A5wcOykg0dY+q2/ZnrrGgQ6NC2dsm1YqAlzgWILYH9
V9eFw7IZGcTGbkpcEtI45iZYgvuLRN09CevrJzUBUFYd4PXIspNb/xA+JDTcI2w8ro+T9KWtq4QRcBdt

o/NUVdK7Yc4Wwh0XFV/Wrwk1LUsDpVmqPKVQZplc8SZRwou5sMpvc7zE1USKloMVzlvtW0B0fJyczUDO

qC2F65ATQjqsl4IiqeLiFAgWnzlvm2om9+DLGdfQER
wywEriHBO4f2rfEcg2K99sv6KtY3wBCwB9un/0YSAPH1e5qmheI3BZWZslWgTOeH+FYuyYJXaz5kVSqb

k9piwAcEYWMP47BqffgCnKYuB6oMHZEhSHg0ToVrD22dTK5Ox/sRNSWpRdI6/22qUkHvQSW8SK5cxKKI

FJkydH/dfkVc+JfdrYHHqczEmhv803oYWLIoSCyxq+
jbyJdrWKZ0SOBec+y9DOk32Pihp7gL0TZ/VhckUBNp/73O1XKo/U445JoZH3mAvl09HygsPQ+jtbi9xv

P7eMywPu8dgT/Ol4+mgJC2SYPs2tDTe5LR5EoNSr1mUWzlg1RE3zX1u1qeHgmCUXgO5TjFUXE2+wQE6H

WFKX/l41AI07Ji02MDkJJX+XzMcuIFKPSIaHGvgxx+
0UdH7S2zWYgdjhEICdbpEnZECYpRWZwpvPS/m5pzMzSWS/qM/oUnr+10uuLqqizxWhSJYFmagOz4vby2

So+UpKtdk2Y725Y9lAdn6J+tLxtHSL+uC9w5drQb5eItXyFOuk5Bsv4FQOUMesnSmXOGVMNOh4I1LH88

R4MdO3Y5eiJBjTtjz5WyInNNmmuGyBO0xCR0IDchzV
jhRghKOBF9ywK7nxQYHhAsVepdAkdhyZlQ7ZB4j3LlmrdVlDhXB9UpGn6t+ylwhnt7muyFvIdr3Ydr4C

tpPKuzYjTntt6jHhGvPwc093P+GhFiY7stLvCC5d74JqOHVSZV4FoSmYiKD8WNOTtV91XDAi33jIWkFr

yMpNMdaIL1sJk0nAZ439cgcRNpMkSjM7prHOoP4gQu
/KHqG4TJfAdvJKzBPBYHR9XsWUa+4IKa1pk1YrbPVqkEFDgzcXol3xCLkv4pjCaqSlnSWKEupHyZJ7eT

/IqfvcrQEJDCXUzqYuvtnP9VislTWC47JuSyH1ZlaDUTIDMHQ8msiYX6hb6gHOMWLIppTskrBidP7G/y

ERj9HlSHWjMfr8SfAA/FbZ6eAPE5ghmmScXpY7l8y2
KA3X0CjqLhg1dPPmvqtIcypA7grebKV8SSy7N7lUcYW5NR2LaF2a48SWOGS9V5WLZXgMBsciOi97h4Iw

ScUlOWIMgQGm4yI8yoP+j6U55WcyUPbDP1N+Gvl1d57rooaPmmk6+R1TioizUBrZ7Xusw4encHgG+OC5

iQd2fiw1k+YdBqHOD0YzbXBh58Dwkx0QT1oqY0WJBL
lInlKTxhMVKw5dBJm1kKK4dCeS80WTMuchEt6oTtZJCyCs3jyOxn5AZNq3Gip5lv4knqXPFRVNJn4c07

TZOwDWUpYAEKjc7FKzcshbgVAhsDqQGntmIPrGi8hoNjtOsQc+xLGDlGF3UTQwMu7iI3FhVGzWf3p8OY

+fvHB3X4j2+8ROrQ0t+mESgmW13bUFTJjs5Ks1gv+o
awoeKPIj9GyEeOtz+J3kR/5sPHOQcYl72J7RPp9HltNUXzRXHpIWRoRW/zrkFZKVKC53QTUMwoKrRS1l

xNV3tG+Obq9dR6Wstv7nGo5xjO1OjMrp0xCVdAoMd94VTtpWhcYBgFW1TtmlcWVb9WdTUUcA29GejQG/

Rbpt+Ag31a5hyw/5dFDHzFnjADwy3DL+4zOGkgjSBE
CWo9iru5+AGg+5ZqZlX7UTkYyf0Dk4OCLl7D2Bu5lgFiA4byZsF5Bx1rZTXNpotkaddAL6ColtpowPFL

cApzDOodtTWTh5BWIj967+weGYfrE8AD1Qk1OZ5uouudeD6f8HlXDa7ICSUvnP+mNdscO6nk/bjAgof3

JUbfCqCdwMhdpKmMDRe8h4TEtzCJ+9hioVVNt0j68N
+WGefMkfO6B18LCSmjwpNb12URv/NGT2vjh2HRm5HjIdyXnlHRLBHaXZ1y0F4K7E8GNEOB2BfFY5tC56

rUMH7qZDm2s1/0tEViCmuzKO0qv9hJePbr4sqWBIODN41KhpIJs+vbuYIRMLihgaMk9xkdN2BEHkJGWG

5IGqr2c8BWf0CiOcGr0HV25WphURqOT8YRR7WpL8P1
T4Nqdqo028GJVCYnaaOtvVR9VRnJhHAJJ3LgDx8X9Xx3JwmnTwEQl/YqxrKVGc6WFcGlza6f2OiTNvGG

/nlRHfhJCDHYxaq4g2qH9uCka2/DC3+kE0PgVdeVs+Jug789kXrBHuJwGiSOpreQXQOLiHE5kftbvdSK

Wmc4Cxs6nImHT5xAEAcx6qtEh+eZEPnUtBNsxNF8QO
bi0QzXfadxLB3rjoHVJ36CO/T7o7sBCCnATh6v/2cerrH1zMv5V46fkrVz1Uac0ldTKWUBqfBeSUcDRP

FS8a/PIXn5rBleVVP9d+T6/+CkoIhZuBHAEhsQ99KYTU5Qo5O2F6NuZU1scXaX5DbOfXftGa4ubGqNed

85sQ+uuAmudb9ceHO0ieMTpXZRyEhqtKlvO4jkcmSo
NOaVBak/LVszoyceASnLi1VT8rpCuoQ/yRcHL7SRMl2AjXk4BY1AZV/FkfsGn2edoka0Fq0xF57t8cnO

yRNshabBDa/pBdD11/hrm3Zm7Au2HauVIDiXu05bZ/4M+CjmTdHibkjyyuMrls9io6hTkzN4x7T7T9MW

/B5YzfsSoOcxx8V3RRVx1s2Uwv8TGyYKXgx9jBTQCu
/kUOqT3hYWRmHZIk3KnS+ssezypQzUISyIrLtMOMFx3e0KSv9FhL4HRyHvOde05TIp1ek15rUgkLF0UH

437LoSn1LBEaMtj8gyef7G6G1Hs4WN2mJuAgfKEd/dUOa8Wooies99OLyQCEyjOaVeQZXmaaPo+pwOuX

oHbmz0/Fx0zlGEwOLDbEgP6KLLn5+bsnDpDR+K2L35
3HkAe3Zzw5G9at7odOQ1warcoS+htFe1noMrguB2N0e09KE1F8oG9BXXiryw9JprVvwdKiX1vOdDRoTF

dVGZ38e9A2dHZo8x2F1gqoWQKZM9dnJV6gYAqame/OVNxQdZ7Ii2blZYljTLdaMvpxrNZiPdh2vqmC0c

hGj6R1RkX+rWAR1oPSvrqE0VAlCee9OnilRUsx19t3
Y8x1NOBcJqgQUwUsBsASNDvyEiiHSEZHofUSeH0438EYKQS0ilEzpuASDZWrPcH/xfCWFIsM33sDBRh/

UtqM5YigEic6LZmzYZA6TedGEAqL/Ad59AhWAkCrUApRYX385Z8Pc1MT6ZAMNMTrXBhOs94k9J5rGfC4

XuwgoRvtUzvj/YHIa4AB9dl6gcvcsuBA+BHuQqtAcX
p/pMBUt6oTZ3tk33ucOoYeIcpvGYc3l7Hn+tyXyzRHh9XIQkI01HshtVJzd8tW7u4z4p97atzDO8aRba

wrYqyUmFjJOdF01XHU67JspOq8EcWE801thkYu5EczzT9gisWucCyp/7FM6Uuz4fxjVlRRrFTQl1eh7P

CV04MP6hLBLtJtfDJHzX/XFNEeyRwQ9nEv8KVLMbQ0
lyYseSpXyxDjHWark5x1mfL9ll2BXS72DxJvmVkNR1CiFOrJuGbH0P/XFW89FafCn78MslsFbuiTUzq/

712YK50pAkwohOUU0Y+tzlmNyEB40bYmPj3vwe/iomqXkLTxthw8mGXP0QbzJzqj4mTju+O3bUW8npJC

MllbJfqUrmZ2tAIJ/opcA4F0aD4WP6pehpQk2WWZMi
kfYq2kxm+vW5riNTh+kz1cBa832K2kykBSrygBWs4Cbeu+mAZQTBlPNpTveWkYpyY8yjQJoqLk5LBTw3

ABCkO1p7ITmes+vpJHRdJrxVIQh/7o6IaX43YriVo87vtrOAWJSjjjHrFV3RZWH+cYiNVKD8qF2DS6fL

lLrcBrv/M3EdbJtllujYHnzp/ahNWtIu617HYuRH2/
lDjLStMKQGkZrDkYjfkqLhIJrfQNuVK0c/JL+D7w3nRBtft2nPy586d08Bwgmy2dmITAg9TGnJT9yoHL

53IB+JKoT0R00NPFfAk+CX7tDIggG1xTMY35ScLUIs3quztbv7Jw0wV0ZJkpABsRWJCBI77jnJV9E6qx

8zcDhN1NNBb2/kvBlBGK0DeMYT6jbQwN4AhJbTCNp5
UmMAF8vw+HzOMotQzj07Q0WKm0WjXEKLCix+yYrPi4cIgC+CAvHKtqjVW1N/Vw83ixVhTyDLPhhJnVMW

kVUglv1w1QwWhNVbj6Qp82u70rQeWlIe0XtKsRxHoKEi+nPJab4JGr76vcSy5Zo//b9CgB4bCMRvS2W5

OsS+7wKztz+bpJ744DrOItgjX0tCXBAuX+qWlC4ASU
HOy50Fu/EGYmuxFqmNYcYC9ZCHo1FFmtQJPUTkvWx45IApI6YEOunUB8Rb4XItU50hkv+kYMR4WnDdgE

LkeYTVwpx/Lpp4LE7E9CJy8uldxyhF4M9VU3ZwYO1rdDzUygVSzS8oT09n0ov98/XSX31hty56VRvqYb

3rzp18//hcDCDURGBgeTO4GI39sJe10QYssVo0WjA5
5OmB6jn68JKMHFQa44RtsQdhpHwe6KOSD1dV/sU6S72FpW27S33+aBh3rRkTGFdivTeUw7MxG2LNmMEK

/mzG2pEMK7TSW7f+SP2Uf/lXHosoH4JT/hTn5y+eBDl/d3H7dbv8cO/bwHFDPe3kNuxiq+x6yHkg3mlg

aLUGTPhlJc/+/2Tlb3hhuNifuaqpQgWM+puLhylFQ+
wtXvPtH/9oDZ5p2+WPOKuuFvBmBYlOy49MbOXCJGCIDdRxQRYZvNalR2JGb0pWaAxKZ39lQp+MYs2hSR

xp5FvcjxJkM+aRAy7YiF6DZ4IvdronrdEhF681o4OTVcxfkVUiw9cAkJ96ayFgDPndT4qmrE1/WKlx4M

5J1/VcqppYipAgYGYTh60NM5VyfJDMBR8+Tge5TqMD
YdjoqrQFqLgOR2YUdjEcEfxegSlJzAkL/zMO6smz7inxVt7/bg8spOQa2dC4Et+q2WOY5f7ebG7ilYpm

nTJ3+My2XSHz0iY5TYYg6g94Ob9MJL/Kl8I/x7dIKTgLic4z32r/lvG8zSzTUq4PskVe5yPvXUV64xLo

ZeffTDMkaY+jIrQOcw7trcUQ0o115qj5sz4qLSsXI0
Wg6C/NM6+SQLIb4W9/htgsej80M0KXyPaegcihMsEZaO2JlY5jgBLA0JWJE6+CeUYkfbAZKYo+nNoww8

kPLSYnxRqLhpQ7sgvmuEsh1V7EDvNMSfiSODcU3zuYDvvetIIupui2MZNF09pJl8L/D2jScCTC+dp6oP

RtDH+kUwN46mvmKdm4GIW9j4bTfgCVH2ItvnQjI90j
HBxmnojEOmyuF2pLSMLEY/RJ0v73NcQ7yAuZJbP2tURmHscoN/B35QRt8gXgzD6LVt/7OqxQRM9VxP8N

qGtR/W9/BrmrK8xy8LooqgNBmCJydIL/SsTJctv532Vtr84UByerrkfEtuPJBO9eyER6PNKlQz0chNci

QwN29rczDWrW7KyPPv1KSu0TfTfjMw70Ied+dehJPf
ruqCjJtxkETIxE64/rb7QrWIZyhasNVIPgkCKOnrjt0LOh3pdIz96FN3Phg476RIhlVPKKSEbA5LMNVm

+xPNHgyJ+LIIGet9W5JjFuUJTj9wZuhtAu0iRmGX6XccC8NM1C6IRoNe1tqMSaU3HXYYCUbmNtnXHJ68

HS3W3k9gZ+Hernandez+glF9+gv+ZAj9SJqFmvLjFzh0zBWO
x4X3+VgjFGbvwDrrngGWYV5abk8Z9rzYNqtm2MavMvqPPF3OW97DmlJjXgm2B/mH26b9TctQJAoijcdH

ZRDrR8W2OOQRPurZVf6ZP+Mm7iR4I9T/y6RbUaUtG5w/SaObjkOwsRLMbsqhgOoMOouwS/83dhH1fgxF

juYabSb9DHar+ZgnrUdu8OBBESuNxWIPlXPA/7mVp1
g+SLqeu8ySLTbrOkrV1Wkg7PoEt7mJGef64e/44eFpY9z+IwIaMVpCm5USNcRsT6vhs2mavUotTKolJV

ZFW0j7LBjEG9hbGBVN1eaf1zf0Xc0SzMh/8nLWEbaRvWbwNioSHmyAzbC6I9slkbHqZh7K/SlxD6h3Bd

8F3m5yAFrQA/mhLROBNSCC44xq6/SIHMwblP0lyQKD
2tZ5x4LaQjULJtnfhB07V8xtm7A/PeJgHvNCTOxTlWkFEzZqgyQtJYedvF7gX41Z1gBod3eSnEiW0Jxj

g+pKw8F8qBcxl6rHHtM2KzAY9oX/cw6TJNQ/bS/PzTKe8sagUQ5hBkk9Ugj7tyhnkfdQdi4OssncP/Ma

Yip6rstQoMlZbVabCZ3fvlIsFVKP1x+r4USRLCSmI/
QpBXOi15umHWqUnM7qNyIOQAIM9lfMl8wVUEEFGuI23oDPgJC28NYOikgWgMPMp0LgIfd41eqwFKWpoJ

Dl4GLKV37EPbqC+5Wn6HHr5fWoN6WQEhmjFV7jrf593awS81G3G1yZ/f+nMFTWKUTV8Bt4L8IbVpUo5u

87+euGx34X+50KnXwlFXo7OnxvAJwhCCDwpMS9cPS3
U2PI20GpAnlleQEWMNB7LHUE1FoF+IWrbocD6j5xm94be9f6v5vwxlJqZfztl3ctVEQEx//KsF7px/uI

Hx/Tlm86UsRGIunT2v8Tdyw+467qFRXvyTUHGfYXuwtrNhbSO9z6o/8k6WoP56PTbZjo8szjLRZxKNHH

04rzaDnbQWFrRCiISR5kdTTWDC+zwRqpzVFEuAOZ3l
2gLOcHDHb8+UtLgYuzwNlzJx8+D1EIt/KFArIxlg5WuZFrnYjLwLn5DqtwcXphXUBiFbyBRz1R9H4ntS

XHN9AHu9XeM//pQwZMCHBxTcb4ssa90xI25+8PHppy7M6Zs92ODanE8nYKBA9gqQPywWSUHgb8et0ZqF

3vGA4o97AOA+EqH0d/whACU5bOE3lheUiurV7FoQna
ToTqG+fG93QFwxDruP7/d3MJjgFy29hwLiMSbQBSqi5fHP1yLOb//qe9N8CNTo6a3H3aoe94TgkfCcb6

b8agG7+05UInVWocXrmCbU9wJoxlTM95m9MQ0ryEFXpKec9OynSum+Nb9tHl50milPAWIaqQWCTBAU0s

v4+y9c28DoH3Z9np3kA9ofweKLiS/j28TqmSJ+uUlH
QfS9yKNajI1XkeEr0e6zePl9QXDV957H1tFyT0f4tkyd00lzn+mu7vOaRWSWuML0JhRqVHfmI7JslrPZ

tO/4mgLpQ4GGkKi+yH0bvVNbLbOhIqEL4sBEUs/NShdJkA7rbZcG2XWgrlFyLH+wAorY0PkGJPZjsLaO

VFziU0HJcdqlr5J2FO7CM5uS9jc5AQeT+8gnJYvyQZ
lA68z2Ym2oWA7/UITbwkLGY+/+20jt1aPhuT0LV+v6qRjeHKYiYdZ+hNxPemNTyCTZP/nj38PORBYaHj

FyrSFUvitOxOGbKbgCIGMGKySPQgcROiBMxOcsN5iL8PMpRzHW9ZGDe4lv7QePE6/c/LHefecV/uOA9n

nIf1tB/2HQsXxoTih4evDnQ+727xG5QhqTcuHEzBJh
MgxFVTUUmCd7uSMkIGYktHrVhKxWai4a7oNEQ9eB18QDScu5akA4WqJZUizIXNQO35jOCGAa8lHmfdWn

VsZeoj2uZqmA4lRPVlSBioas7oZOUOSC0McM+Ipn6ndjnNacqiHJ3PxpbeVHyqdtAEaK3GqIIfG88xDE

cXW0SIm/uR8jhatsBXUmDLFQhh16r+S+EOD5R08dSY
9kEyMmUGo03FOG7HFdQEwE5SqdSkGY6IXNAs2lu+7wskQagmvnWKiHdGIpI55GUarFhLrjKpf0fjUh0G

57sew33nwrSJYScVqYzn4Q2DB2lP0YPH1yOMfrujV1di2zCD/VrOCJskQ5eKEeeOrWXGd9MvNK9XbEOT

YGkv4IaH3E12VLiurtJyHldBQbGBR5DAVSiBI3HOzv
PzZwENub/jrBDI9ykK38LfTWQP+jmZ8TNUisByo00ddK2XEW43HBDxtsnKJXKgwZAOYARqdiN0dZxe8X

fX9Tf1GHccGG3d3V6p4o+dq7wqUEGsTPBCplDzk8+C73nIvA8z0npULFxpTmReweyBDSBba1vdwrlx68

uqF8elVASuvWGnMxcld81v1oHYKT6dhlcRUVg476XU
k8cWWOATDK06JD1LF28nBGuujzuTILGdyPo00kxtR8CYoqmoj8ViB5gjxGK7om2FufKs54PAXP6WJDiI

0XFPMpL0Y4wi1ZFvX+tmR/UCNpmi5wYbzm0czFKYsekkjMXrHgXKIAolZNo2BBKpiXiV0IyXdjKuP6Bo

JnLvd5+Z5SYR7ibrFc/qzyNC/xjdniPBU+S4G0uMdx
Q+J4Kj3zt1rhWo4wNFJS8Lhq3a041z1/Bll7jRmNPFGH8BljTCM3FScaRQKf+UfQsqV/wmmT4eGFnQg5

WzU10glIzxJz2B0piLdsHB9VC2W0rTB/BpaE9jEnf47RUdrCTdqesCXwLu0McEoBO/zMlwf1s8W/sSeA

o6nuPXz20eWvjabmJvraR28hhq0k/01yzCud/iD9n7
E7AyfpodOxEAQHcDjKWgGSEs4cS/OFg5YhjU7hMn5HdKakqV05IP55t2fJKfYiSUx5ddQvFuqqvyf0dI

Fg1ZkAG9U26bPURH9rZla3M34Ywmi8JXPwMlTeSvb74lPWA/OqFm2GTMwZiQkwMWXIFxSevoznOWlM5h

u1G2zppwuNPdBh9SaGAZuTfhJMd56muAAq7dGiYCLz
o0dYSK/Xr7TlPRYQjX4ZaTzjT443oHfrGRRjh3Y46Bt3VhwtX/fcdNA+jQD1bdMQ0YavbOCik43dwDhm

4nqRuPeMfGu6i4YXo5KpL7+Hje04RWiZvJ1wRDvBsOLhUlHyjClksbuinfZZfEMql+YlLHlXFMy3X700

BRasmgGHlLIn+AHSF7YkawYBWdhkdMl/hEBjxxaVd9
DdghN3BsJf874q8it2ldR7uTiN1/B+TINsC9CzJIS/BREcY3n8Pton9Z9LPW0NfTInN9+8Rpt1t/LAuF

uwnzYlCeGm2s2o98xe3bSzE6zSamLoZK2wLkCYDgw3sPYK/mLnXPCyD95p0xLOvdBuMxCkthy1Gt2r9h

j3hZkBKQ81OCr7EYOzPAneZIqKgkR7Fx9bELCpAQLa
7S8M4iOcq+X9Q6oMgzzvvFpF1VlzLLOZx74ALYnVDzPkTIP/7NhxqNfkVbrNO8wTiLPki7DVmtQ/ddvL

Y4Ax8POKaJEqnEK8263usu4Ckxz3GURYnOAEi1UjfgCXY+GOm5G9I5RQxMPIyxbuyMcjoqNtY+JuQKP8

LgFDtekYGKyx9VjBZJp3lpbli86La0MP/ltoaX6ag/
2MRru2QwFfMHMB2mpbkuI/OHIH2so+1qufHkQJ1Hn2ykSkwWi+j06JIPgncjZPyr7FRzTFWKp2YEvsjC

gngovsi+qJ/7zC76zDghdAv48/ERYUndykfd8aXdWkEtHezNEKHp+zh65h2L1sq3p0gMnS0K0beGUj5V

2qOgyJuvL7dQ67ZCCJFqkFCXHV5X+8Ih5pK4tx2iSn
+8C48C5qN9mS1xlFu6i8I2NnI6ZeADm3rArD8LLPI1AVIohbHHLHELl1bCLpVe9ZIn+F51BDFlh4OwO9

WIjnRlwkfobWaCjDC1YPIwcvV5ymnJZo1MnFasr3T3FVQyUBcwN4XxHV/RRCqZEzZ4aozhobgkcnSkIT

2GKPlRLcf5qjt3GOw2Bqo5fIBawu/Ho8bmuZ7n/mSf
jyNYEjJyopW5XG6YzHI+98ujxQBviRfGNo++X70KrMXVr60J5eXiNoGzWGzbVnyETm+ist4bYYu6zkDu

/tOakeUjcGEIS0vZ8NnDFX/pD5y1M4z2uSZmcqSpHkAx+u7j4Hqhs7vrxtnN4aNUrI4ErtBFFpCLQkQd

DkCX1E1AnTS4Bm/eEadIN22j4Lu7iq5jlTFjJtPQNp
4iWqidhRsQbJ/jMayH6ftX6CyfxX9i+gku/igOPPphN96s/ysm8dlxWaI0n61bL+RSl2/yHQ7ZtFlHSJ

gTMC5H+o4FcSS0PRUqYXIauzq8O7UovF5M8Ocs3h44xCw4vCNMo4m1zjIv3XLh41zhHGwtLSgag+VF2r

y/r2jVokXbGJCIjXNpIwCRjoQH8PCnuV764lWcOoqR
Bmb91XJOLFqYp/GblFE1j22nRTDgN2GFD7nA2wlniquWr5a+R/F03igD4LKLm5Jphj5IZNdbd/He3O7Z

FfPhIC1hrqP9qQYGedMLZOulkVkzn6K/qf2xIebZRfDZ5C82FhIcyvKsFRAJOW3wsLFisplKxvXLpgA5

wx+f3+9Q9SmwR3La2EdQEJFpjl8PmQwsl+chlNp0an
K/7jLVNDBwZ9iergrYhLQdAroi64bh99ENoK9wMP33ywbBQlr5vaCd9i95a+8gXAdVMIkslWaa/T7TKc

pAuH/tCTvJdYPt/BDZXFPNLdOOr//pKRgcsxPTrcaFPzS0ONres5SyX569JSn/VUkbcfn4YFPbNKo59L

dJmoqfZsLMAMHJSyKXb9/+VeDWDBmseeQeJP/uZ8z1
FO96HL2bNJINx8sid4NUGg8Pdzud9eF0Ta5qjukHobZfvOQFpIuP5+JRiururBj1o+7uP6+gCtOS04QS

2fmCJQD5bZU4urvH9UwedCb/JxVqbaoHwxw4mBZhW49qJ7XDNzQRrz1mjnlIbwVx0pk0oIfF9e0cG/QB

ZxOKIFJKiGZDFjrnW/V6notemq88Sv9vb1P/hFnyAN
+FiTYcvSmbWDpajPvmSnDLg+QxfeGm9NmbXo90qe0J9TrC774ibJvmCt17nTt7Z6VwxN3Wvbl9/dFuS8

4rmJieZaeQHr98rzLaXQ3gNRvfEqsF+juHvhXalPyYAUJyOuks5OSo/s5Xb+1WhJXplXJtu/GP7Qkb/s

sqUN+JQ7uLnYyB2c/j10EkdZl8UORZJXi6RPaEIubr
SW29VtTAeOHYKmaf+umt63D3wQYMfAcsfVxVOmHN0rLBJ45BG27H+Qlm2w8/a6+AcbItuinKBTkWYRXC

MyPooneo8gptVmy0aR0T1/yZxlzy0SlZEQktOppRMGo9POjOdRaiEv1Iu3B37X6cj7cRJBQb6e0dN2KZ

Vt7LwcJAkPW8a47vlnKyw2UV03mtNJjn34Raizuz6Y
rR1G3H/PuT1HBEdZV55vK7Z6rHPepeqSIk7sjUgJP9OFChZHvshZadqbXGOPeAatqkCWKdmoQnKp7gil

A5Rlyh3gxtxusHhWRxFnfrub+o3JKm1wfyEEfRScJDmiWaz7+gSJcDey9VPQnFbu6bIRvo+hhziS71CB

K/UQnq6RqSoLS2y7lnRdY0rPKUyARK1yveHcGCbVs0
FltxxIhXFOwHJkFUT3sz4OWL1c2qjdFj2pI6FSmznO0ld/LUKTCS+kSgqCehs6VXVqEsMtO/Kr8UJ7Wj

uM9NcWgnKFZXUuTjhq13YuAx5/DepVIW84sJZ6c/BZWE6hct8NZjlWdY9pnyPmPz24aR9sY3DsO/I3Lf

Cik+n1ll4gqrj2nKI78pZHOK0hpAmI05Q6a1R5tFuY
ldplkWa2PiMt65UeiAqppj3WEm1f24wjhcLR2JkEe4nIxTHXgr4V7OtSlINEA5rYrV3a+x0DC+Scqogw

zIa2NxugEQFFgKW+WViPYwwaz7kuhMEVJe/0+pAuZkbi0UQv39kgsy5MCpAA7d9/m3/FK3eCWJJppP0H

J6UATzIxO2Q1At6E91O6R5WX7oGj52w1M3B+Lh+SOb
WaAVSSr5CgcSyLBvWe6WGsmOjiNO7OtuPr65ssITeVMer6BDYM90go5Ez2ujux+JHVpb2V0XebKarnmg

NOq/7XIVXWaBFGU4rl4jBk/P+3puLGEHwH9bRORht8Bp9E/D5EuC3DPctalSnlS8wzA9Dwhy99Pegz8U

jVgJSsfykCuH0TVwv0z7ItbYdlmbWbLd4JHcwb8Mti
rIJQkOGfBbUwAUu7Fq9P2+c6+JWXkOs2vUTQkr0VRc1BCA70RF8pAQvGjmg2HaW7MO2mY6TeHwpfr+GY

ubzIo5+8YxNy2SelaHMjcJ9J9k+m/0ps8pDMdz1jyuRf6cQvOa+30wbhoVjSdD6sqtNlv+MtFflzLKiR

Opt2oBzpzODtcwH7K4sjLrb/Qf6/kSuKiNiPC7Ptxt
2KHKNScJER9nzdgRCIMDKVUxfRBr16/B/EckusodWdVt9faqzSPxxegmVq1+oO9JzMoVzKKfv7LKp5pN

HS4eK/dSZVW2IYVa4MFKyMYkLKfqTl6unmPO8Dxcghq7v0llvBakMIpM90TejNLV0/AQZKJy3JyJR4Xx

j8RHpXha8WCFMEt5n0Cscn7w3ZS0P7O5BBZMhNtnMj
BB+xBywewd7+s3tsNr4uYK3sKYG8OTAHhn80Y+ntEqSyHBz+8GrGEhHNG/1YJJ4mqSOcAPstkCNjDJCL

BnQYuBLFsiTz0sDXAhWLVXnK2+a7seTL9PPmnW+HRHB/xQVUtn77AdsVVXyaORG6u0s/JdTsfuYAgw56

T9+YwZWduIockSWxh+JcdFcn6a1nzFsP09FuZ5PFOJ
e/Lt7JkPqqPh7xxPuZ0l58AGQJvlM7biIxyKKdY1FmABi5jLW0k8YL0BBUU8yhZ9GYEwasfdBjCEbbyC

ZSYOXGN9UNeieouvhkmZjZLywOP3N5oFGGEg9wq+rM5re0dNA66XLqNOyI117HJZ7QEf9akuKgy4qgh4

XoKyzqaSbF8pVICAUOIbO1Ygi/xlsZ6T1jx3dKIJK4
5eTHxNfBBt4nYYIGo1856AH0RBu13lTAl+6wWJFYLm1zKsMUsY5MFGDD7h471hzMmBj4sY89HSaaIlwz

Du4Tjbun0geR5g72hsEvCCcm8ozujPIEj2lt4WQOsxiNc/mNkz7rFp0WrY8GgL6hQCMZ/vHCwri8WEok

GRZtW4z2o0Pv/BOCYMVEPtBhik4TiaBUqWe0rDBEhy
o4wk6V3kPn6sgwrauwGsOKuKNvbTb5lllt6p3n12+GFTtyL+ntlhFHqPklLwckYgxMJsyE/iYvFqoH/f

/4G07k+oK4ubQPEHUPHjNGkiL9V8g8KjbvxnTr2zs/3B3qtaEBlksPHzS7ldf5+NLQnk7kFSVE+hCp6z

ZEVMWJ/dKFRLwZIocr0F5pIVoV15ZwQyvJxk7Ymj6Q
/jLwxFn7vtz+9vrRzcJhZAlTWne24MQqPd+3+VmOvHrQJCiYR97PIOhp83C0eJ5Yl2wrWsZPUzReJyMb

cbg1+QD2N9tgKcY2RcMVMah7B0S720jqeNhk6vDpKaEWxApuABgY5Bm4GZvrKT3SbkUwqE7PXQaw05e9

JeCgoIe//Fu+gq58Epf5jIIRRGtNLJpVm+RT9/STum
YaAUZZ7LKLRcSqHNixpT2hlJq3Z5dMI+LU8mu8OHvEOoIaQFANZQ71x2wMSy6eAbvNTHF+bDoGJ2wpJo

5Egm1UjT7rxjvQAFCuOKEgFqtP4qeU1Qy8Guw6xapP6aKOzzDhyxFjozpBYfcRx9iH43QYYcCDLcla8V

j03Hkm0Yy4N03rBLl3L/rXr1LSkviXwvuBD0/Q+6Gv
EvaoK3paorwQLB6fQp9gKIDDRzVjvZU+gT5QVjxfNt+Ui+sVbxjNa88Z1pBXcobWMNXep1HrmY665PLb

zBgp2trD41Wc/5vmhKh3evvV9pM/mfCdoLklpEk73Ck0vSqlXhzKInweuTesSctztMQd5wYjq+TP7f8H

c9y0FZVXoH0nnXDSGVZi6xXH6J97zGmwNpUDs+Y1yQ
YrI+Ix6r9PI8RM0XyIJYVD9h/Z2LmNbclC/YrJU03bHuo7lRi8VlwTAxgcUIMO+D7I61Gcx+tEBM9m7m

uSzRAPxPOU9kly5a5rXKXNjGS8jffhUivrpO4TW+ZbNE2itQrRYqFUQ3K6AKgl+gqo9g4/8ejaOrf0Zd

H6/jIX4/MS1k5yOA2U0rn8z9x1IEnGazKF+42DQm8g
wxrzRnze6D0uRwzgKNz2dEC+/r+3ETYBhtT0zJUjZvjShhkYfzJMhXqL3XEIGa0c9lYDdOEehfq4EFwT

vJOabjKnn+MMIQAsnYiM4lBK9ySLXAjX8/+p/T4kkJvuS4IW7NFqMLTTOlLcG49ttZRY/dMbWhC1lT98

X7YX+c6mMaJk8Hj5MRbKQJVRtraQF99hl45ExF2cKs
o1SyuIRMVwabL5WjqIw5dojKS1Qof38YZy/X/co2ssXDlIMc4ljkpkV1GGmaTvFmPx7vjzME9U3X2huc

O/d1322C05bjNhQBZCkGkyf4W3wGQrS3pLy4bsEmVyB66uqkSGnpDQ9n9ewzRKqXB3bB6lus0Kk8/nqV

cPh4/mh8pIOhHL+dE0vbG+fcG7kcfZOjEH/E3gUVEs
/A9KsHo7gUotF+JbuZfyS+/P0icJdiR1x5QQDXoq6+tqdTSWPCJYb+KuTHpNCysa1Peb9YAgWVThtr1V

ToMlYnF3Cy7xRVt3FYEHvOSK5ZExuG2dUu/BpQ3BySBtVddZ3mRHMGu48ISFde+6Wii0DJfC+9wJRGdu

fEyJ1Br0vfOf5I5yVctZnR/0CMGk3rs/9aqKMw9slv
RfApxshJ8dj8SvXebRi2TOrHAxZBDkyQ+BNr4ArBEB6mwKn1d0Oqr227+sy5Dpz+h1FkC81Nxx0pW7wm

tuZLxgg2r3yUUygHn3Mq9unehMLkUrgQO5ksExWP1xi3BPuxVyjGG2jiAwORKB8PmaeE0A5tzag43zja

Boonqj4LrW9dPNzcdU6ihCJHJP0PlVnTOywmPu0asH
Ku+rlLWatQdZvh0DgJBob10bX+o+IoJ84PjbRYJP7DEuBYPJq0/fgp2Yoq9pCFQgKLfiWtfvENEzw8r5

g8/vsVvchOTkX8bx/4NUFrhOCqerqKIUkZgJyLItt/R1LuU3jyy08NmZiso428NcNb/GEdbvhZLSPRx4

Us8/QKcq//AqaT/1OxAol4SX3x2rM2Bh1PvgPPQVs3
RjeOCUEi+bC2LPIo5VeTdQZHkSLgAzmDOJC4VFeBwzH6NC4ndc+Kx+2QLvAi8o6A/7oH4XEKPh1FnoDZ

bjF2UgqY3VglOBuJv9dbrDQwYuPKd7Zmxe8GZaFVCVdviV43oRpbtw6a+BlLHGOLNAwtHSqDJhNfuBG5

12QNJb1Dr9Qm0gOs2BELZz3wvuXVafLw+U1Cx9RqV0
RSp2qA3l2zztNfl4UgcnyclOBj/5qlbud0yXLcv414o0v7KnVPr9sv/xBhld9A7KM8XxfMtsDOD4RahS

pdEkeTbMWBbMB4heX+EwpsDEjXX/oAMjjY21GEuusWpUaJ9sV9fjXnKrmyyd+nrXsCJC5pSHAnoEQPxp

7lCWNLsXSY3ezoV9xOdz2q1k2URvra/7sl3tH3RHyf
ECLkSzL07A3Q/TEeUUtNWp3hYBE907oxXfjj953n7f5Q3ePGqAiVU/nqutQOODJle6hionXmfLg7BMYE

8gQhKSdtlqAZ5qBC2iJj7428P9YFeeXmwmIMw2X1xKl2Lz/INL1aSKZjseYMN5SViZ6mAT0TCrIcBLm5

DJmTHQ2+NKY/kzCQw1B8FswtvKjto7EVUthOY4cyi5
sJQIRNG+oJ4P5yWxwGJyKITCh+jZfKNhdw4ZF7lr9zFpISI0Y81/ljOOKtO+2TqUfF8+s9X4PCb0bnDW

j2MZ9zOp/03wY15LsLeBfgcRnaIED41oodsu/gTgLds8CD3mBdI8X2a/Cyyw8oUUk64oqLyMLBZsMrWq

DS0XO6H6IrrlEJ7ztBglX8vdlj88+/a29MiSiXJ7Si
bOFNuU+RW+58dgLCcW2Jkw9PyTTT9Wt6/Q8rGhuXwRqz154GFSVu44TfvS6Q6eJ6+BWrtf+k66BHhCcT

bzdV215iCqPVY14Npw3UmNNc3opNZUgIoqh3gRLpcsvLCtvjKVRN9uPnJ9RdJkdk1RXYTU/HPbmZLzFg

xTm31C9qPE+mj9FIEkrifycCJXJs6BTVI4cT90ZmbS
YSs8bXQV9/HcAA+cPT2pO0B58MTy41PlzK6MBEjHGWl4Vh1TLBsd5FD4XMUclE5xEgkOW22BodXPPyzw

F+PnYL9naQRedQ4w7u0hT/601shL3DshIaVRInkfuV3izZ+1DOSMxgyzbGrvY3nNEYWzzxrnVEUcLcLs

xZf/0BRxSeT99M8Yz4Ai/wxULlsShqnCIkM6z0rYYR
bhBdJBIedbnGehk//HDfnNpJzcE3COMDPi5HQXp/nJgfvagmrAWkzuSvlTHxcY/GQL9Mm1qa0kMbCikv

mEYanK2eYTe+zkh+tTYTTYhkfIVBvOzZFjpXH10wTDe14OfVDZOIDVG7P/mHY/K0GrtLAuo8zJj2W/d0

IBETW03/4y+rIgBuHvplRZ8FJUSExG6Pgj0WwCHWRE
yPH32zcTTlm9ZG1kvM5TjUWSxCxgjWlLl59H359NXejOLp86baUrMb1rhbfkyfAWbv++xOrLouk3cE8F

Aqw3ghNW9vxKIoGXCM2iV8tACtusjaqDEHj1xuTgeqRrRzwMJrR4aUh9V2Tc09xShKd7WizruVNO2/As

ezepMUto2kL114a7YvPUpXZvBlXGMk0U3Ei6Uu2U/B
HaPB90/IY4O1DiOmyKZXASb0NaJz44vdQxqkVhnu21r64bvyjj113zWIRp3uVVGQrdSjrsgS7kMjLitu

z+TCUqsGLZPmvT5xacXAte9+t2tMw0nOs+4IX0sY8wwtz2XRti4LxsBA56dWgkIDGz3v9iMDWRDPwjHl

t7CtiMOE3l2A/qgke01Qj9l3qfFpsAvx+rDo80EUF7
Wkhwcr2DgZDkshiAL4RQgbK/HRLsBVKvA7UrPQ/0s6qS8HRhofXKVgv8YB0o8QrcuoFrYyIbSHAXXMgX

q9jNNCypLzfbpq5ynaO3o6zn1o5gID7NM4V8ZYb1O/LM+vwSxtvw4cQdrYoDRoqkN0j0zJcQKdjMas5v

wLdpXhI9ePzFkCO6Z8fdCTjg3Ekh8Evzsb26/NNRRL
elfcpJFdxmVeQVT2XKO3ssve7hSLjce9qDHE3/Tt4wDomBL1Zna5i/tZRrRUuzsdZOvDBW1iJO2juqH7

8PItaKuT4OJ3P4kvrvhapXDpR22mf2p/mj9FltWO4Jwfhy+iuyIfdydgvIKYvC5pMikfKMZ/cUpH9jB8

qCp//mfyKo2IH+VbjIrjDK6eWobHmM15dh27o7XCfi
459tSG4qXEl6Y1YPB2i1Eps8PKw9MojBKxErjHWsskSPyz76PlMmtXjVgi3ke3vxIbv048yPH/TFsaw8

8XDzv7QOEKodJ8fMijmHMMDGexeBn1Mp3Ked3XA1HcUt17wtohak1/HXk49sk0QWd+IxtBin9oFZQI2+

IRlslgueNW6nvmKe4UzHHyy306NkqD4YYjvaSqu4P3
MF5Ro4BHnLyHiD4RCUJ6ZFYBsPecTNJF7nQcf60w1T6iwEj6gjnLFSkCTt1IrClxfeR61JDRjk92VZFn

Paj5bHbnDWOVHs5d4x1jXhLovg6tz2+4B+1x6SON6YnuFHGeMwnj856dGjYhIXv2nPwbeoBz9Bm+GyFh

p9jZPOoBc6uMEs+WrBVqJr1Dg4LZHTvjfB0h0uSK9K
UEr34NzBcSUvUrVLpxPI18oYMo93CRo4BREJ1DDxAtMCfP/yaWE3ox4KJz0lf9i3CrsflL+xc5lQrjoj

Xki749jNC9lFB1AZ6TO8xbFCal+wXORpyZg2GsCIn7VCiSSDuKhnEzCo7/BT/rddOmBHiQyEVxxzq+Kilo

/6mcQaPzGGp+DH6dsizxC9WdcccaZcwrckJ7hg2HrJ
6NXTCXiz3P7CWzdBUac0pMuDGY+bH7I+ks1PAfTrD+HHcrUe58TsHYBqYcAd1ly5mWJoNa/nOpDLjTks

9eOaU8+ik0P9vY1ybNUP9SXCpxTb40kB4V4Lzz4MEjfaWsk5YfVb+VQX/gq8ZP37FUhM+I8DBtmOMx+a

WElrkjOPxUZggB3/NbtoV/OtcpRkXvB/9o34mRUJG8
Vfdi6gm6ZEg/Gre8aVmOzLWkQWs/zeUTtu0r61b9iMA4kBllhXsP3bEBSsI5i6NaLTgvqPssu9sHKW8x

/ryLTQgcl54DZ1FvYb7qP1hYiR2WeIil+8c1h5zpsuyvBCY6TxDaojgrGYWKr4u7RAu8Ir3hTr2yHHem

Qtu/wYvMJ7YjEmL924gYMLY2pNq1aR8gbgz8We6tDd
ateeLw/D+4zsZo3lYuiDXMA82wYIBy8vqVFO/0GGp21qfrFmRhSZzswXfFXeGsiGmXc9fy0MH1OLOAX0

fdOWnQ/De+fSeXgqzUagayoa0F8yae93sMJglWj3aEMFA+3a+UQKievUo8ZSaWh1cUMLiyTMtRgjSuZn

BR0NDxQ6o2mFBrXjJ38QI9ryb6/X5M+mkYD/P6MJ/w
+Xm8bqncECVIExzp7xknNdAH9YJTxNgFfHsRgI4AK+irSE1sLOBYLuOTnTTE/JFeDAeT11lJxp6Ym5Ou

nQmwFaY0HpPIPil7Bd1wXPhT6RO4+rwZessavFKTXbyVx6/Iewgw6r1+lFufypDnWxjBuO8kmP1D4qOK

cClqOPnaYFczNZHVrSPxad+x28Lhjt2D1fDm+xVOq0
1h8IY9y7uleXu6rPdX0uzOu8MCCePAxIr/fJhPW0NaRU50uK/qAC7JDfag/v7Laco0m8x4ZXGzE0mzvr

dIhYB33iydjazKw7W5XeV5289TZ/Tkx6o/GsrDUwmb9EH2dUUQN3RcohBAnQt83f5cGVj6SAMnIL0mqA

CE4QcCHCSWZZJpD4o0hjxMv3eWWJ5ZmKgr+fihfXkT
b/qWBg0gbiB86BY8KZTrYh1P4YuBVG+pYYG++zE1QOKM0/1WCW7G6TyEO6eb0pzMG/AxRajQV+QlvQ7K

Xg/SEaQXTkkUHPX303A7IpopGtkQ8IVqwqrEyG6tIGOMnXmXSjshs0DOvc1rCP/3ToO50bsz2trvPEsi

EXKousrCep6JNZDdJCKMFY368MHJsiWjba2Z2/AYEw
UP5VaS7+dfotcEKb4atT55Myo5KLEFJnsCbZM/TBrb96yPEJMmuvfz6KeZzkN/kYan4ziwJKmdhdOTyh

fNRmU/dvb7b629O0+SYtYAGwNpM3qv65muv1pGJGc4/uqv5Ft7PK/LU4HVQilDlhsUwPMLADr8OsywaP

oZkSPJJU/nEenUY0lL4jl5pTE8Gm3vduJEaH9tmNc8
IzsuJRkBiszqZ6ULlGAtwyTYgq+o3YaEn04WjMQ1/+VjxntDjgdUF/wlTiEfVIH/r66GNds3iaoFxLos

IWm93a03lbkLcJgDbA5YFocDD+1C9t1WcAVG6YX74SF11JcABkhPxQ3ubMvWdxb8Z1m+H+5esh/qHdqy

F1faylKNAYjaFUApx1n3DGk/jloeBYnirn35CZHSv+
kj1jbvzVy+SPjVzbCmQx4Yg5ImKd0aaCO5j56vyvV42RX/Wp/bMx9V8tzKaUkDMCvGv1/WPclfCLw8jE

Eqxi3P62FRcq7u47T3Hd8rwnyLQQwf4W3cT3mfYTmzix8lpEzHHJoaP6H+qrkbiwy5MgJgHnantQckMD

WFOUmxezP0UErKW1h+zyH+h8pcU7gXdu8AyuxpXkdm
U8kz5DrC/pIhOTNK7lAlv/0I+E8X+i37+4Q07LOV7eSFR0EBOzKtMuNwIIBtxWQGiwZksrIgJaJ7FHhT

1UyKrWbLUGq145u9ZSjvnyImJqtZ7vrO+R+G+f685//s5AOLaIJTOlei+U8hU7ZoxkGf9UddmaHkPTb3

Mb7ReCIq1mhTt7yVtnKflP4G/vQ5+0QUWHFy0K27bx
Bj7D6sWW46Q5jUN5EMtjem643u9Ev6wxEwki8q0be470mDr7e2b9M1aIlc5E/5Q6e+tEzg0xNznZEU+D

CYw1mVcZTVla1kD93t20PYAjRU+Bey1ksm6IF0H9Wxtvwqc8iyfdWlRSffv/varPjV82Bl+vKX69rBEq

UxIeUEOiEGE+d6eCQpCKZ/+q+lxE0SuTBpeTUlx+Eo
6Rk7ElK/uFlsn7RBnYLTPRZhWpzwE/AVPKcsqZn9nSh9vJ5ODIpkig7ys5Jlr2p5BIztDSMXTGDDwLAk

BcVDXLWn1msMQqIJ6t+1tezORIu/JAv4u9DP/AWP7a9k1pMoVFsdvYVG/qDZC5D2YHzpyPovUuZOY8TR

gXhKc1SeDbfbug+xa6JXIrxHtfaxclJ6pByLSlILco
Zf+hEdPS8Og97Xyr3dmyFfZMIg12OSz6x2ma/c17Z/E7lP1XVAtLn1xUYSqHNCSTo80t/D0VGjQM4tWw

A9gVtqPzi1ZozHOcCJgnEVkj12tNBxdPmMrEt742kaL8jdHVzK8Ccr0EqGL7N7Wnlpgd9SG8uzZEY+GF

plw2CL6VYd6kEc1FA9KXYrjxcMmFIUX49ybcIXXUpA
SFInv77D9Mn+RSFPpzUjgC/o+SIHfYX4lPc4RDQ7dByckr2KUH5by02i+DB4ETw4BAmS690mQEiHsKgw

g3SFnN/HrS8SLqcXwiEJJEqz5Au3iszhr9lYvWXOD9Y6Jw0n0xSehFD4MCD18jts4QQdbJ5Tc4wDGsLg

AvEcWcXMSU2dcVuvouB/qpslO1eIWOIhatmxG/AIO4
l3X4rDbjn8iwy1S25JsU6mykN8GFPdzghysy2c/xxcSs80uAryfLAZ324K2f2xqi4rNhyaurH35HTgBq

BN0f1O8VkM0gk8Az6L12cbgDFfKTrAYW4Z6ZL00wWJN+x+aVbpw7VX6Zjo+hk6m+eioLzJnsaUih6Ncc

gCfI8g1I/5n2q9h+wCXoKCgwPLXfTYiPxqHXkRf0QC
05hzShkfNGzMZ21IUHMeko4xVq/a5Npcx54BlfxuO7q+tTzcocuPWTcqqDFyAf9lzdZ2T5vI/cmv/ZYu

oPqpQIxtMou+pwo1CCa6NDoC3bmeAw+YwAGjEAIOx5adIurpzgrgti61bM//W68pDGu4h60sFpYeroqA

jY3MPSsJItFGuGovEFa5b4LwXMNJDLD0C1n8AMEqZX
CYL67qQXnKty/N4C4uE1LHB3LGXjqZEQAOcd3RL4EvIZ+qJgDGGAtD0en+cD8ZvzExAp6ad77NSzgqOG

K+uOml79SLjRIXA5ZKazDgyst6inU0iypj8ntZWn0ZvXZiJE4FS3B6pnWogQnpwnC8cFNL9m90W5/yZi

9JrvMv+i8jqlIntT6DylamlalEDxeUZzYvrwEbRNMM
g8T4gUEHQW8Kd7wNCv992KWFq94OmDrtPP33A59aYj6XSh65cmsogdJQ55bpnzNDkjHxfYgJCmtJVflM

mFCQ4G6FY1Mfmf3oyJFg/BSb4EAjw+N3kv+35OOn0L3EhNFCcDzwo2krGu5iqOuRfCW2hw44Z2ZlSA3p

ju12A9ODSlgIVBkL1nX/kRkl1bP9G/twzMG3SB5Uli
b+qz2eZaffPyOdeKN9cyGcvunFj+xJ/H3UBy1znzf7FvPlREMmbAsWU/94UeG5LCfcDJCnh/pi22X8GT

3DQCF775PBQDplqS6rDA6WryWtIo93krXtkQ2aEJtiyKcOhrr5+dhWxCj5/f268Dfb4BvtsFUOZBFvej

2rL17jX6pdLfQAeNC8uUKmBUxE6PlhoVkipV64Kvft
vjhDPIq7W9k/3iABnN8VGjVT2+Mbb81XfX4X+Db9U/l6JOzw6RDG4YXRccoQRZWFc8mnuNykCfZBRJsN

A9I5u6NSmR66+oM9stxIkvfRcLIifldpmSfLjzfNVJuvwSBydhmoGSOMqavZfPNXk0GmP2XgXdyKIesL

FVvqEZdyAx69p/jB3do5nRVKnlt+i4i0rE1C0I+88n
ps3v64c3L6CFi+3Nf8zGdOR96LSwaoEvO+plSPY8ZqljE/F1IP9IzTKTBGFPnzaqZSNGane8A8BfHdA8

k6ESMC2Bmf/hap9GlBzwp/SiSK9jaVjW107KpHhcQpTTvsWyAAX8pg69UeOdzv00x4wpChPADeARN1ct

lNdptlc3oohG2HHjv305opc76yztX2ewDfw9WWLwXn
X0+8q2mh5RVKmgejQtihLBzPzeKAlSgSir52Uy+CX5lzGx261+R6flaM3f4Q32YzeWNmh+Vx+ohnVvsI

eDIfkA3iFXhIfeK+wSC0uIC/FFVEUhtaIHg9kK1edrMr/gD4ggmGHao7edrf2S73ZkM1ibr7tqe29TcE

IUytkkYyEBKik2qDDQJ2g3EuTi11SH/pHJvVwSwOwq
kR/P0PxnUSbxyMDrL7aK8y0e36yrPoltKcY1sZn69+kY0diBkufVMsAeooKzkldKqdutaTJxdrk2hNkg

cZjp0yMtDx8YOfwuT2xh3L7VkAOhWQooFFMUz+lQPeD3cjVb5opsM5C/TVBYtZbZua/7Rn3fN4bagKPX

Vmqyo1tmDXiVE0oO/xw8Gfw28FJoPQXoIUACfJ8weQ
YaK9zE0KDPdMhzqwlhFe7EPNDZ2D7nnqC9lwIl11ozlWQzcyYq5WItqsOJIG413SDMsYh5/im3UngI9V

Mkfk9kLh2B9bLBqw2VRdCM4aJD8wEf2bYdSTHz+uuUosiCY3C1LdGf1xe1e84+0uLetWdK4bTg2Za6Mi

Cmgf0yb/rwmrIAIdZXoulINhQeYhytp+SJLGfbB6WE
mtYcEv0OfzsITDF81U79ICjfFqV0Rhm025Qsbm7ZdHrNq+K1Jj8RiT2GGRrfUBGeF5o0HHY83/9KMRKs

n/VyRKFKR7k0GMzx7gEhSoZWWfiLopE3I2rhe5KOS3jj0YpSMc7rNt9Nn4qtw2qJr1d7cGUX6YEcYMY5

+pUTxyaiiDfIRSBENepl0anMo+anl27zymYo2oJA8U
C3PACETDioL4aM9dDgzHmy0eec6WxHn40rXNmo1rrjfdk9VU/ilG+P4jkpLmqnly8kerZTYc/1ff+3Et

KZNhFQqyyQjFwFq9YX2x+SfExVGb6DsGWrienpvR/upYnNKBf/0uNJnTbaVbU0Pi8NgMEBzv4nK9O784

BwHol0SneR3eHHI53t71MAzV3f6stlCHTq/YmXms8T
s3oW6l567xcvsPn8AUEmUzAWiD/yRVkYWLZCE7rn7e9KAbP4Wen3dHAK9Ojfqf97BbbipKPJDDJdhMct

NM0eqD81JY8T/hjnGA2VCQVEB5uRoeBFNswqf60Tm0X5k02sX51K/jwCDt1THAN3IrSwzCCo0IeQrXUM

lRI8MQ5+s3ccKtUQITzxVDmgEttom6rEg0m16CaeUN
k7bWp05x/21Bu/QoRl7AUOUABE6pkUyskmSTdokaOHEAUyCqeEJaeIbS9KXBLI0dKQcaxCBtN5BjCVs/

JWpkJXf9s+gDvnHA7X6UmEhiGz3nSy2NWAo4OmAm+kHfL0E8frn2el7YIu3zrItodXENr1rNoD0g+56J

avMy/8xh+l8MeR5GRM3Iva+RC0RqKhMDFMxORYIjmo
WJaAHBbQrY8Veb8qpmCmp2Vz6VMV1OhRV7ZYyKvHE9m1mRn3nI7E669eocPeAnZEoF3Me1Wn2NT7BiX2

knB4LdFWbgOhfxSgpZznsMEMtqBQS7p9o1n/yq+FeR3Y9Y3N6xV4kaERQ6++BPMyVFcioTapMPTkoYbX

qLko0sTQAXS43DtdfFwDSNk1hrRdoYoDm5ptV1XOcM
8/vTSAFu+nJXbTmUOJOCzc4HXnhmOtBGTRdomCSFzTv94ciiSGlq+np47wj9yjulNqrUSoAfh4AMBmsz

8JE9jyRGkoJil5j7RBVXJgrVLpFbccTon6T5gXM/0L0NTYGvU300ujn6Pmp8C4W2nNg/pMmZyA5Ihgdv

pl6XspxRshcKl931/omCBPIcxmBruHJPsTcqsGSYRO
ic/9Wc/PRvxGtiIahEkCaTrsvbKUvW/Sr/1ga38w6SdyIaLeAQ6m4SFgqYm2Y9YpHxOQ1gnecmx+cVew

7JWt1qeDm/RgzJV6N9ZZjHN7KBGNyJBXpxZMNTRv5Qe8rb+0Mh0vsQ8as0ukVnly++fEbzOEb7Ajr4Nq

Yfw2hFBAP22sZLwSvQKkJWqh5r7FL17YfBUHuXc8Ld
wgNsw8ZvQJrXwoad17qqd39BVZKCDgByqBuFU0tu74r8F8gNeCN3/3UW1xnNwTBXjv1Xj8Q67OIrmZa0

jPghER0v130pgHntXcb6yjMFT0U/h+Q2SY2UHhHFZEBvIrr3kj8Ccgv2fusZ+rK9cleLORrZnL4wD80T

oQe8DFzWEhAidJH12VsPGsZnfK3arx412mNed62ytI
Z6izA/7+KIgie6WNsOnrKTZSQfndtv7+tVzw/GtFoj2C+dzMVe1Sr2Ue9HTLx8A2UAXS3Qg+/tNZmitH

FE9Gd6YhsfE26skLCg1BT4rIkcrEl3bR+Q+YtZa5QNNdUyBct93DJctxin1vPgmOQHHVPPu+g7ZFGedK

ILCA45a1exFn9SD9I3CPmZP855nyAdeBlvh1SIFL8e
x5bPNGuSJsQ07TQeVz9d4kD3NqIA181Vh+7ShlTBwIHj6CZmL+qugO7n1qwaR0z0CmeG45AV2QDiLiQC

MMbR7LdepXZx0W1UJO/fDe74cjVnVD9Hm7qoMTlnYjTx591i0/h87+CFnYTvELjqx+30FpGZRgv+vfmV

YnaWDxgav8UYS7nRg3y+XXrMvx0ouH0CEAe4Nd0sBT
EYEMhA2/ydBPIelqzF7rPRXW9algESC2mpCygjI36p/GrSVdAAywZ1gA+2pbJT2V/C4F9tG8CmyT9IkL

XWm35R+qQNBaIeckqpEwstSq5x8Br4ptI8ghGPmdpCb3g7k9p/2ZtmHCLMT7d5OZsOmXPZAjRDCbUJV5

yJMiS5R2qjgddzU/Qm6g1bSgAu40DW8xIjDWOETHx4
pJHLJ5OVPPZ3m1paqhwK8WlA0X7GHtoFQfx9WCg5pCtrZ9QE5EUquMAAXNZ9mdDYabEk72aideAtg6MF

Ww+4JE7HSZsxXNcB0RIvHP9zZPZ6WVnfVb5fIKytEyZClDoUZHhc45Y4UD0ntCepFkr+OdHHYSzLRTCG

5ozV8jBAxn2Flt80h8lCsc67f2B17EnY52T/qIbJe5
DY9kEt7l8z+pmWnjcHQdC3tWqXDqu64hNpiGDQ5RgevcVpxXRqXCNvyYTKCwa0xGfF4ttGB7EuifRI2z

nFfMthEaLQPSYW19MMruXObk53dpL8h9GqqEiiT15n3yHf+49W0voNmYKRQnW12Tx2T2iO/Aa/7gcnN7

SFGRgv6fYf81TQfyyPZbt4jsY/7FiBoD+PoR8E/Tzq
tU7P63zJ1BRZQmQ38BzOwGcCYhpHRzfBbEIV+CWsPR/LHpQBjG91AB2sLAd7Sld9AIQaumVUp+QqCAvl

FsKVYnRjUrIcVGNTPIMQuspZEQM11K+dvGW4Spg79RZCvm3sWEl01CorFtRV4+yn+Pxp9OS0ht3MHMU2

5hCb6KZBGaEPDcOS3B0doBCKbd6ZFjrMV+vWE22aSg
4n9A27m0Z9LM15k5gQand1X61FIczX13XpEkPLicsPcLg/nxMezXW8KtEWWjZ0VsHXW3j3gEv41w6+D+

gjjqY2L7ledgGLj8iFeOpP4fuuaM3ofo6+pCp15LF87hfMoWhkYVlwKGN0FXmrlq6aZ/t6L3wahtQcFb

UkQOGwujw0uBWYH5z4FAOPUF9n+Ajge3vGmgVk1LgK
WV1DZD6m2nM/MV6RKqM9obAnbHl7MDAwqxqa9SCHS5TAF7JPBnWP86HNuzwEkO0FL2Pjv+mEujV1en2H

3a/uP3MLjSe286yHXgOZ7s9hCNJSexVbqid14mv/o+qt3U+tMY0KJcrLwLeo2rI6ARmvEYr5ZVmYw7em

L4B665ocYI/DBwPv2cvGuwF56vU3Hx2fUmORR76joX
jvNFx6eRfEvPVMYXnhrIT9Px/GRM2fKdYS9v6WATDWB7iC+4YqDnkE8Y++AaTqg6TCNd1C4GQZFHI0MF

JgBFzzAGwh/v8TqvzHwVFDNpE7Pikuox0c2hW7V2rsR/sAwBHiCR87EbmhajsOhJzBB9WnsCmDY+akoJ

x2cUrkpbqkFcMNVx8760sCaLrjD+djKX3y1g/rnhGD
8fLL9dfm1aIaL3BUcvOodNuItmwJKOtWi7ZoNaNIhETmOE2Ir0mi/9af3+hKdaJcJ5wDph+2Ri6rd5ga

MFBqt98LEb3P06/6q5DO6gGBGLBBC2KoOAmFU84Q1p6wozK3fdbNzaevqZUYOiDLMBWOi1ET6zDvnZGg

/xbKf4cytpojy/WL8CRimLJp+N1MyTD2tkkfPm+Wd2
i8E6xVPjnGOOu7II5GFKOe3ERZaDv18q9FXahss1BESULYZ/swGmGw371r7GZPmltgwt++m0JGaFCban

iFoaRZUbE5uswkLmDo0JP/VjvfyuP9lxPHXoKxyLbj5dkUybV6LNt7v97fJtDU8y7zf5z6mgm11tgg9U

4PLs+aAZfhrV5fm42tBFHHK1s3Ir2qSVNgz47c2a9n
KaOep+7+jOvdo312xzZ+PGB7cKA/Oo/twJVA8qCN/jkwU0+THhQlCUPk6/ISFAY/czr12l7ClpJT/Sjp

bi0SWT06qiDwgztCDcX+O0ieJc0YPbX5bmc7pYDlWgwnPuG223wO6UBMW6AI5JmdVv+hmXY7IAuytkAc

OXfJ0EZJ4lhKBcQp9q5lTTVrt7aLqGx40B/7QCs7yk
SyrUjbvs5e/4tAZHwAQrfGmYFXlh2F03O+mQv8/kKgQf0tELa1HcWJ/gRqaxaKV6korwhNcOShqTHbgq

tCpV0J9YGw904PRxS54jGgPF0UOYJoFsjKKYC2IcBmN0tGek+MEe5vRdcmjCt/v3ytg0+NSlsXn8aBwu

QUkFbnU0CxbJ0B3/JXA9wrTilJZRSvpD7HE1rJadVM
YPlX4DoeX7FQgaSI5tmzjLpl8751zZQQBREQ/clY9PRn7qW2Q10c70h8/dUVT+PV0OCcWip0JMyeDqls

YO9Ab+acW+bK1aHsNxoHmZkmc6SvE22BEHkKBQV31pmiu9yqp6rs7iwgCrw0P81D824Xsfuvqc5X8fn/

T4sToaUuTzGpFL7sXAxONGSjLm1IXfc8mckN4czQIK
dTcykkX7C5Esrsnb3BFT+ULs0QcQSRbC2Dw9gtKvuLr39Ni/L2AccZ8EOpq6ELaE//wM32BGxAqAhOHE

/YSn0g6sZ/DJTv1w3mVzgxANK5luEI3WLoW1IrUu3tRVYwpSBCBpFm//PdMvC2ugDfo1n8u0aO6t/k49

KW8+XbwESSswdceewHrvjn6G4y7DNxSxtm6+PhPc5p
0zeKwrkjr7eiJQN6iLKN0z7+KKLmPq1jkwm5lzZyL+neZd/bPFbabJD4xYE/VTL2lUy20Gkpjm9lV6HQ

+1b7eVgfDh8ka+TVG21oXNWTvPqQ2NNKi2akKgEjOBa3cwYWtA1oJ9yFozyrU/5OBw7DrX+KUvOiwdCf

BkVKw7O6iUk5vkZS9xceyK5QT8f2nBBPurNNbOhWze
2okeo2nh8++ITnUaoXIzzylI7nxkWSmGdNihjgo2Pwm6t+zC+8fOcuYca0DuNwRxClboKQimzLXpmbB4

31nwL0S8brq3/sz+GtvPx+Q/qUpV0ner19irsgv1NfkcLqxn7XS8EqzVK8rUPKh82Z+12IRleGd0KBDe

m/msw0tQ3CPzLOtOuLk6z4yQT0Le/qdz+fK4lldemM
hfA6Qp/Y5xkMG0Zqkhc3fn8kCHUDuPTG2IFwhQ63rQBWl5bfcGnQez3zoW3dowtgVuGsorellPmOVOR+

GpN5sSAE8DK0nFiwih6M7iMhqZoloRcTlgeN/JjlgnQl0o6NclGpdl/I5TQS/SON8/PzUc0yrPpctm9P

+xU7ndt87nVDJ3GRg+Mk39dQLD4FVbLmaNDkEvjlcK
SD/uQE490BkNJCrcWPjrBQqyOh8RjH89YQ4C2y4o/+Ll7bmp5jOd1KfFXaRj9qCXLDa2e3zyg7uCY7Xx

PPt7jZ+2JgUox4Zgg4sBL7p9N4KgWgSf4MVu4L7NVLDa6gyQCNk+EUMmdvB/xuk7Pc8R7n8sHmtrgCn0

edtkuv6sT2A4wiem9SYSz7CdepqvtcrEcVODLfA4g/
5xn17F4h7KBofqf74Q7Bf0WkkUvd2otrNk+KdlPMiw9/ZLDhJIB7EAREkgXWLgFiZix/zvPSQ44M/UeK

qRuxVR2obpzb0lXNYQYGEuQhlKWTe7vAcIwu7ao/K6U8NSFpNw9QlC4ovuVUIvjOwuHa8JtHelt7OpJV

cDuiyvBGYXS3mOTf+wPZ5IYqJCUKR3QNF/5mM8nL77
ygqOvuMIZgiFVureHbih0V/qAN6ZFfBBYxfDfesCOJsQ6uAoJgN4nhx6e+DdARb/EgTSEMW0rSYtSzWK

b2iqAJxIzcJmf2y7jG3WP5PCJBdioDlihBCxsRoW2SeoM72XxrT9XdkCB4xmy0GUILhDmXmQ/537Lv2b

QPbdKwfgncdq9Kv0Bm2sZc68W185J+nJN9iRUtCHVE
mU9DwR4Hy1G+9n2n4TWdttGy26PjlQmOPF+ZJOyCyc3dP5zmrT7xHjvM+mk1cbYb+hPkAKJthPr59KS2

jvAL/eD9x+dOXg73wH0nVswBP5CTyU+I+v1NIxGdZcZYbWxIhOpUBG0g+GtPEFkxFWjHpbsvrM/0DGcd

zeRnfTd891AN8Nt3r2NhGZfHlN04oscvtZO+4bJc4C
X2lwJ7SIEVwei4qflF63E4U/+r6FZHSu4gBe/wQPZJfxEsqcfHz17Gi2ahqsrtybrXSfcuRfCSKfG78V

USHoys7eVru9j+zb7pYwc/XfywDbHeD+1TQitW9y8eA+cHLu4cczb9fOpGtP2RV3d+X0ZfGB66mFO6Sq

4rtHD8n3yk2SI0lwCkCPpUbH3aqIMECt3PSIsBXgfJ
k8sdf8LUlk35z2Qwd3gGjl0prG3GX1VE1USgZLrOR+DJZGbic6nMqu8oKIXzckNLy0U6JTfaSNPHdFi2

qzgZpBCtgQs5R19bAcxrTpO8xvP5w7ckLW4pj81tjGKBsgn9sk03jyhK2KxHxmd0DfFYbmhzpltyRiH3

vSV0To/Ott2FWzySnhaYIrOwEeWJIeulmtLZPlBn/I
efqSqoa248q+R90lvW27yyPlU6/lOGybjU1MfhEdXMG5mEDaQupS7Jhquh/TCHzxy2GU2lUcpCzzquNF

bhUN8U7G03uHxef2i/RWlHMvlswH1l6Pmqgb5IwUP1ca6QG+4CKAxuLQv4b3r5OfObbYJM4lhYhFY5cm

cmPFUHmv+irow2aa9XlkukYlXrvU2naZKfJen8f9Z4
qkzdMwAhO0G9piaQZEX8v0jzwhdjeFpxix47a4yZRX5Lwh3tgmbG2Bj6sq4Nfg/zBXdA9TSpPdNaodaG

LC24iUdd+xts24aqVe9d0NNdobUfWov6pn9G4paK9JmfwWeM6MgCjqg+hOEZCaoWEGJAUGqRERDu/vTz

jX+EAwo36W6/i0713qIybf+6unt0Mo1wlBb0EIX+NM
Hozh7aViw0X2jK7qRkqLJeX//kdURPG46j4ku/YKxDMMF+X4D1dD+nDa9hAox6R2bRjWrvW+JLEvcDDF

ZDBL07JLYE4ohtgaE0ZMJ3zSk4+Nm0oCBhmca5Eitil/fQBPoSB65aI31t74BTEUy44PdE8Gx9SgFk/9

IJFr918bTY71/gmBcIspzqprxVBuM3umgOHeMULROf
Mq3ADnBBqe2vSmL4Mi8byAEl/PgSFeeHb8WuICRpONElgKA31mXzblSlgSOyoA6zXTZ6RHuaJHczgHHQ

81aEj88kyy8YuiVfX86Jyuk4kjjpmolBzzjLWGpbCQKeDfXMpZkDgNudmiWq+S84+wrcvMMi6E5jadob

wZs1pvEXZL3lqtYtV+qHYIHEtyvfM8U4LZYlAOwuKg
xn4EdGh1BmcBcyIrSj0fzPlz50cCZsqXidvT8Ptr3NP/NT9i9i/ZxZAFLncdH1KzOimEWkENJch0uy0C

aFgTHcuoWrMaqcT/vYrppKq9UZBJXmPunKCc9AX9370A/R++qpEnYihIK1AkG6Lj7wLFgldANOl6dUhT

FQhIL4uDhu2cZQlJOlhBVmT5ma0GPuuW0H2ublUh1h
5XaUUE1hSnPT4Cubpll5VsmzBFFEyrkIcOktx10xZS1sdoCurgYFv/8I/KuCpgdg9AU3T+pEKTFCffae

JAhdqzhtJVtZLtTrmUdUWb8iJLO2QbDcvzVC9QrmPIz5BHvJIYgIHXecsrW9AWENnkFMGlntvzVLDGVG

Z28bpIbvlYH5HKOuJWxuATs7GQ921HoFo2dgp4alx6
BJ68hu2vLuw0tOi11KR95eyqlpL2HeFaF7JDVURswhPyZaFD8Dh3CqLRO1fz89u7CQWM0q3w8ZvNO9qE

OBvt6GBHBqId3KTBzaZ2VJi0js85W/PHniAuISWaZwfy5swWfafhE3vkKQgK7LWxZeE54l9dt/IQO0wY

wIsDZ6ivAq3Dr0kRRgGhwxTAiXYsGJ+OxaOrQooDBi
BkByYapMGRak/xyXHC4obTiJGrRZJEcwKxwdvJKcvYNgvGHLimBt0naABkFM45is7wpIgf7DLFBBV+cX

Rk0lQdEXIYsroO1TsvifSKf4MDiVP1IUqoRKxPyeRL8ffRqinMRDSAwD6wrYboAIrghG/ZnG9F1uQHIp

44XQP7q6HJWETl4amk+GuePdbnwde5RsoEtxb+p3SK
Sx1tEE6uTm4OCOr8Unen4r4eNalW4xUQmuDnwUHqC38grHvVlEid1J5pgPUxJh9o3qjJ19ZW0mATgmET

tGcqV9E/+U3YL+w4YE18+OMsUFDgy4g1vSgpylV5/Y+AiLER+EY9W1Q7RX43Z/OTil0zYXgAALgjs4Lx

7o22cHm/5oeewVEYy0WXYOpX3ze2J7ZasCbRBxKSdt
Lra+lywQIc7vp7n1q4b5lJ10IwnLir0oIO1vmM6ZoK8JMlbph38wtfT7ToD/+7zD6pDFPc3/rThA5eSB

klli8D6IYuJnMtjwqEUX8PM2UBKgJfGicH1674KI7rS6rgIsylzTRTQwFcov+k3wCxDj91pRXxO48jNX

sdlFPITx0inBXOp28ekDHc5yALa+UR8jnT6kfIv6TW
RM0hRbnAX5YUOsFfc5ISB+ZUCjg9BBEddgw1kzIM911idG02usbOqmT3DERe5L27eiQainI74+RoJATc

WF3fwOZNp5S0JDc+38lXWIADkz/f5FuT2smT/KUDdPNON6D5FwndL6Qv2yEdqFBkmdqcvtIOf0gPGD3f

VST91YEyroIRtWtkJwfLCKMS0qQoz95CzlFIHdRmT0
aZa51+0SB1n0aCiz4Xkgfjjqz4+yAd6aUYZQSrrDr4v2HGCqILJcXCoPzzEPpEK80yJ/DP5QhyFa+we/

fU8FQHMxDldemgztcBzq56rsrBT2CPIMOGS2eH7ud0FXF7yTP46WXg1iFepOYwdt45xt9x54aYbtuoRb

7PHkl/838QIP6op2WEg8YUv6sacPe362XSHkx/1t4x
eHXgBzosOsG3s9fZKo41vzQshfWiUuqwhEbCPFiZLRa1ISPvWXUSYQoyv2TO4YYuW34t49SIXKdp5Df4

mtbDX9EKdpAHKq/LPee9/0UkBTl3ItqXozDQkfgeImchaad0Rek6GE5zkj6a50S9Z9OCIX2Zh4egWdI5

gpJj525mD4U100cWJPiuJ4iK47if5qI52tmQTO1zoC
dcdSy+0ieAEFB35tDs/Cv+66w0DbO3JBNyYlAbb1FKEa4XyzikbkPlgWFLCpZlDPTdCeAIcrzBq9yTeR

c/AD4Lm49RkVu2BTYDr8tgQhkB4hIS9PFwvy4P7LK9F8S7hnkkTvtDApKipe2wVhKi/SQBIBJ7f8C8gq

PbvvhHHhUKpoxxHxQYHFWH0fVdiyLfPBCTCHG3nusT
czxHP68j+kycIZ7wntAL+h5USf6FEz+OdGwtOJs2LwmvcHehq7bhwM2khbDyUXuhW6LXY0Z78x45ThUh

Orsbe5ivQc5nhGnnLJhRxiRrM2vWh1V3FE6NnizuynmRz0PGFlhTwsjEpeH4H652WRWj9krjhOAg4gd9

onX20MNwzQd7bTc3+6y95/jtNsMPrYMkeKXIG4coHm
ABl6c6YxNh0kZg2Wib2g+yL3hJEfQ0S8bc8BlT0VE7VX+xY8CV1gG+em8Ctt2ZL0v7aKE+28kEOrrmZm

jm8tBoLn+F7p33j0j6SfgNSTg+Hobr/C90TG9aT0jM/iLnPva5/bf4jWkp8USzyaF0IWQIqnwWJy981E

CBCeMcH7yRO96RA2ABRXWxlAucKO13f/sFxVZ1xAY9
TMxHusfXkRlShVuP3nZZrcC42BRs3BXYGVjCrlNL72nPOSlUKYYKYqBy7Qx61k203w34BYJ9Z2t1sbMv

DB5Sr8pP5wCEgQR/XjTANd30ijGeU9GiasS78j9h322rZUSsavcbQrK+dBTH0kjkfRKawiTMPOjjIxSO

J4IN6MdiVKI807s57xMeyIVgKQbDIYaQ+UWkm6GgpE
U0cLQuAl0gUfjrpeq+5fhDF2PsWqIySISHMof0KppI6Qcy9kTDvTDmxZTgw+ppcmaDNjn7f3X1C2nwot

ZMVrJGxzI8o3yvFaPRNqE+lInaRt1WLmPf9KHCgHleAdr3acAQVDL9negQDcDAGIXIEQLl42bt1o49R4

7k2RErcqSCf1QVEQkshRKsHYeSts6LyobeHt3DAthT
8YE8XyyCq+tsz4V2k6rBzHrhtlKSRm4II1LfNX4PVtKybwGVAipufzzeINhHBnVyMRMKWveR+zK6VkAf

yI6kV/aA6KuNWwlTAUSeV52GOTVaEIEI9feftN84/ylRfEFDZlP8NQvlqvn03zOJKO00LM4vU6XmgSou

BwRNs9gajzIgnSxIWQpiVpAv9J9pNT+aod/ncS9T5u
V8f9z2vK4CxnJTsB4ZbTpPc1X4voZIpnlyW+d1b29JiubsJ+HhyB9VyBj+deiAVfVWrNdH9QJsIM45Jm

gN8Q/vqo0eEG8QsxOdyX+f1g7Likc3LsezcpE63fjxv69zzkC8w344g+WmfgZIiH3Iuvz5sflA4vw3su

DhstwrpBxdNl11H7Qvh2Kj8HLeGcd3OP6tL3QxAbOx
SFDQV2BoZJO/3upoJ/Abena+wA+BtIKZzZLBYz8jFAohs2C1q6m5u+55qak+24MvhtN/l//fWVrwmu2rq

v2kYKlgEdrfp2Egj3125Brp9q1NHCagKhrzvvAzU0Hx9uNl22ccHEBR3QesyG7sCKxK7XOC+qgbjwxyj

BGCB83gW68WDo3JX/aJHtAfjER+M2O0vTHQcbNJkoD
K0m1/oUCah05NQ6Z1vCsps5MshrRKz/bXNMQknHR+j4RVew6eHXFKbJzGYj0i1FPL1WpYOxunAi9TAQ8

awXF/AhR4eUOnDEf1Oq/I6/GjJe08tlG3JTk0abrE/5XXojPkX1xlQAgk59jp2YRWTOMi3W2Uj+EcGdk

Tabatha+LQa6WmV/coon/wgVvhyZnYWNG2Z2tr7As4Hwdr1
2/nUeXYd3QuiHk0E2C8Lf39dV6xNADt4goLEBg1Y/STWyXSab3pPkDeqSXbpz9i5A0BCplHzURj3c+jT

R8nhkuNxOzYahxKyN30eVe+TdFtz6zHeZJ2z5T6dOtAAw7O7ad/gLi0JF+gCJWzHBJwYsyI7g3G0zx7M

Ch5ZdPu8UhPK/D+iDgAC40MdLBE1QSWTSzNpkZravX
nHQ8CSZGzlu4E4CQeUbgh6rNS4yBTtnXDKTJm9EHGmOketCnfW/jL6XzdDRAROtHOUiLB3lR18qxmq0h

KeNeXOtfHuaBSgBarc85VWUaP7FYRSuqDupvsK6+imKYAVaji2LvPSmrEtMyAluxoTqcshJVvQ5iTJl1

GnWeh0gqJSndASp0pev9muaX/bkai8IO/SHVZMQQ2L
PO+Fn6T3SWs6QmrV+GQeFJOGhjHTKBuxmSI/jJZhFNaEeTKRfC/jid8+FVMKcBr6L3eX4lGTBBExeAkP

2ox6G2cxchIKRC/K0ihGDQxgfGGClJPHikIKuWaeGBSOnmd53zloYf+3wMB00eGZvFsoT2upsMciDJBR

8AAYZ2u/O8MgwKbepX+5adF+vS44NIVqPWFnsbafJV
S7msZdr3iFSI0bH2Xurh3jpIC2BdUwmQhCh3ecPTmZCCdnLvN55IiXjNSwmcxWOMCAcJYkz7V7192hfs

bkjtmTKXixHZ2VRAfpmq58I3MWFgCX/8PtvrchOjM0ypSE1Kdi4UCHFwHARi4zWzx10S9LjCK3JAf9Fc

lo2iTnxNULuRlwO5YUk5VGWv2lUs42MWcJpIA8qi4d
kDjtXzeKoSj6GT6uqxCwjGQtO4L9Jx/yTXdg4jUzTv8zWp8Ot98zVzzA/xvf4gviBWiQ77872bmKqL8E

iPbsSB/V/Pu9hflXO0qVq1jlj9+0rko448MI5h2t6+LHZqVMT929WhadTLJCvKkYB2vQAs4QhFo4gZ4F

oymZwKHLR2C9Oq4JP8nrqxB+kx33wn1sxcG+OuxzLZ
qGJf9xf4t7TLB5Vyx+nudJcAP2+CG+5YX/JKHf5JR2IXPfn4L6q3Bh6yorym/1uEakySJ/2WpwMxkGcc

YA7uJbuTmkg16U+MsaAv7RVYO5YuPa6YH2M+kvRtvfnmJuW/3+7i3gHcZpkHe2nXq37i3c+BUM/+1Ld9

45O/GYPwR8fvU/rZDzUTlIr6cE9O0GTqizkQ7c9Y/D
ntpdUXPJKC5+VTDDCRjfHbBQLkCBLTzdfOC1m3Afmj+OM82VnlRuq6jbPru/pZq9NGWE7qdU3gonrssp

QDoFkic0EgwVF4DS5UXSeCR+0wjbI6cCdKU3dxrtw+nz0fap0tfGIpf43xARsBKpbXAWFnia8Z0I2NWA

9NkcV6UhihrI8+xWXz9nO4ESRDFBCNRV5dPxz+Ysh1
LrqTd2N+Ol6qitD09Wd4rNzCHoBCf45+yjrYjlJT5o/ow5JKQC3z+i8+cV3OYNQ5v+pO8WKEkkF7tEq/

qR5n3fZ8WGa8kLRJ2jvgHtBoAgKAxW4K+7yMSkQYNL/orXdXD06OfxVUvVanxwJfpbIA1NdkDj0sY3sX

pXEmBHnDinI7oVg+QA6PfvWTbXyyBqFrYlc/8xqJPo
q71Wl+En4PExKSFJxbCclGsEOx1P314P8mlj1zI1y/dtrkdeQtHbbwCSWvFdsMgNNznNZtHJUQf0WxJr

4+q+yy0ekHDXdoaaiCN0Us/RuuogVgfngiej3mzfmQAowWSUOhfRN5U/bHS4vCpAeQwBaXn47BLOWxJK

Vw68dHr6ofXEU5IMKQN2HJj9d3OFst/Wd7DTMzHPu/
B3Y92hwgcNkaT3BC5E8XriLWo4DsKgG1ddZuaftu8sC+0t5zn7CfzH3xBPYUM72Ck5GbGmkFf50BuNaf

CCCIt1iEMjfnnURR0BCzHK3wLW1wOSK+obRibokGyho/YVrmUuPOq1Wc+NzJ192GcJInpyjBTN3dwDbC

t915qfS5VcRZRt+ot27DBZj8b6j0G9CX8ogxEQtDAK
LIZMu2YNmz7UOiu8i2/fjApoCFnm47XS9wGgnY3hEb2+0ajt46lWDZjnQrFUssitVZ38XBYdsnXNAhei

jikv6pp2n6i0W1ZJx7O6uTKmSYgqPBUjP836/4nfwbT9TcDbKmJ/GMhVhTAMJELVf+nV1RM8d2CKQKbo

JRaOWQxlWOeD+pbzTLl3WamRw6L8lpB8upb6utipKC
pKP5BbdmYERg1Q//z+nbB82joj4lHFhKMy88TbleVnZTDba1SvXzSEH3FE0l+7W+bd+nIQ9Q6y+/j6c/

FxHemhY3xgZpPO4RLA70xf5yDDtvqhTNBAEOTuGc1G4W+c/7i/9vjN2BTPOvdewJtArBKn+KTSo+W9Xc

Umrw8n0Y2kQSeWhjxuVaxhByrD5mB4/2LiRjD4vang
I9L+rso3fS8GSI75G7JLBejjwd6us8Y0cb/PaTq1wOVyrE9Y3Fz9rJBMDfLuGSzOW+uSPvo27fT1pd78

tbCaY7nr6ookZK39K+o+DSjC7ikyvOAadssZl5PnUleLqs+6z0BE2STUsRX8KVazQ7Bv0VE6VZCumcYm

tn3B6ngE9ue4Pp3wRYgZqUdlDkulongTHyDw8CorW/
KGA2L3inAVxKi032SHPR9AZQ9iBEsusA+HsMjWCOBuPN0R3wbrgkw8OdvDdvQycTx9CYcKC+2t08s4Sg

QaxGvcAArlDrUvCY6IueDT1ecBi7cnRADXWz5pfPa4KjPJ5tWVCdqIt6okxtmWpOsVhFF33M0JjZ1ZwK

wjfKXY4jXddiX+lKIKGWuaz9ycJBoHHital2kBlogh
PRm1udj0rPboSqNLHwY3SWQvuProK+icpD5eH7y7jmCaoc/w+rL5VhZU6lIT2Mydx7AsX0aDb5nkl16n

UOM3RwGSBe/oxqGl810glXVH76Cre9HhOJ23xqoeiijD48FC9QMcMdkxsjwRYj9QElLwqCDXB8femx3S

0lc1x7M2rHV9oVom5F4k6Qz4vsS4Dn2qDXOX5FrS6A
dG2i/40DExOeqTguLeq+eV7RdSv9nWF3+S6+LgiEfIKAO/92nd284RiyKN5NsQtIgW2kkslGwdLE5p0x

bLaoKfFP9DFtPMoR/Yg4szSCuIxtWUJ0wyYIN7bX9C96eK+TGdwoHutjsIqYo4tDVB6n/Ka8RHhXKqB+

e9MP8cjkUYI+Z7JOvNFyGDkikOgqNlv//xlJZMoYAI
eW26cDjxU9qXDMMO/cswdjE0vJGuyGd1w9pcOd+FNDrUG6TkxmdOROISZDhIN7w4OVZRhM8Dy3Nm9xq/

iNqwPnZilD5f0io42mPJ8maL2fOZ2KBoIDj7EEWCcAz1myYXSG5DYJbvUPSzMg7h5VE0sybCA8T56Vup

tA04dMP8JM0acb9fkPUxu1PopFQiOQ4D5xBevvBvpv
D6FHADc+o5OtsO6IM69+kOhpWFpbyztGJZaiNE8QTE9CimZZV2TjiphDAM5eC9Yd1lXP+uSHVcqvO5Xl

z19Qj+T1wpARPD0xob+eTQeKqzdPQueXRCCVJ2QUrAAT+g9Tyxa3z2p4ia9bc8K07MzkPBPd+XZ0dIBj

+8zU4YxbwPqdUFemYDTVfmQMsxGr2EQTC+jZFub2h5
jOTl9KixGcempHXOfZljKAKtZu08AjA+M5FPRYrMvuH3YpPD7hCLF/STFtjX/r08gVKmsvwDf15BsWlp

Dhbkw+fgrg9qsqS6ROnlQ5GF6IFrsZWI/SiAOCNyMOtfwAZU1ISmdR9a9lCT8+5EZ5JcPFCDdGr7gExC

TxIdSO19muFmPlcbodcVDvlTVF0XF2DAbi6uYrepUl
degZbZwiTXlJiihi7CgWdHJRtxHQ5zU9GV8kh6GVNS6qkXFuTRJus5DQ3wbqpS39zgrEHi9JgzcGxl2o

0dnMDVgwu61aaz7A1oexDdn6vOAD/hjQAKKnHhd9WiekRWvYmm5NSFsVp5293NSPNoz7b/AAxLJYIrIR

XnPtT7AdlvwD5M7pNreyEnnZLjD4D67mrOIiJiGAdK
ZN6mvyGbl7ZKFdDuw3Ez0RIbbST2onM6530qg+lol8mdi+8k1Wnj554SUEda87Bju3/JK145s0e/9tlU

h7cjzXIcmpEj6f7Xc9jwZ7NkyNk9M2ihOPPFRDPnxc9qjowxr1rLmsZL/VneSNbBpDdRLUOrrKmhBFC1

XfoieeX7wNkbfdIA4n7lj855xAaKlMGTMVV3fcGca2
RH9ofr21k4A69QSq9FWyGNgqUd2NmwGR+k0+AyQ3pVsW8Cip8Oe+QyM3kuhzRxpuRrviZYdx7seTew/d

v7Np04bmhy/i/iu9zXhADGPNiwlOUVoxqo2JNMTiA2wGHKY9cFO+lnVpSWezw5nV7C3sahp3JKAldnAQ

Qwg1piP9BTyfVZCp6LQcnsAd+hIjIHWOivmdIekwww
CgZQMEUPYQonQmbrv18VAWAytbLrIJyDC2a7pSOVx4DH/PaeB0ISMd0jQwuuJerNB/Yy+z4cAlivSA1Q

1NSud5kmlVpR9XALJ6KPXRnO0Z1eY8LwMMiQWew8CJ9d2dgn6TEI85XyN4ki55mn2/ifuFEPLbc9UUgN

yeeIY9RvLWQ2TdLXNzIai4aPIO5A3fa34UQj/RT6jN
wOS90efeTsGQLI2uQAQkWn1vKqWIkTdVo/2WCQfQ6j6jT4e03Vv1mtPe1qFdMvJU+br4kv+0yqu8X7PL

1b4hl8FIos6eZUjZPaD2BEpNYi//piCcuc9mHkxYjb2fsGgo2Ki3CuzKobUIqQC5nslxYYOE6zQhzkLK

JtKGkp/fG25CxGoWGCg9cSYJbtnPIenn/894OaI3lg
XqW2vt4qfb7XurRw1vrAqYABCLkUPCLTJUwPj6yLIX8Mw/Tp8dGxGhD4fkQEtYcigK6NDn130atSZaZS

vgGgMAloLUQDtcVlzsjT8zfpkS9ZB9b+OISofOQZmnixyBnYFZkvD6uFvqZW2898V9buRMavjDtdGgt1

O9lXafb5lvQnM2w6P4eqw8uwIXvyxcuL04wbDr6UKU
BZmdF2Af8noi6dO0pSNlOAVlS7NeNTp/35lH+zg8VzO0XcqTWrboHUScchgJoIqQqPm4lMXjb2/cJM0f

oY6wemKxHyMZYVSUmpoqJHl3D4nROQ6GgvK+IdmgqV5g96ktbLMiOp2rGyCYg0mYKkNXdMaWzI2c8Ade

Duvcv8i+nIGfUfp4wspsa7+q2PwFuSRWfem72qd7Nk
UnhPsnwn+nK7Wupq+dY5Ngbfcx3d0qwndDDO5wABSg/PV+vdNsQNHyv9hKgnUT28zZvVa0ofCKiPmDzZ

KsG1QZ2wL6FCW/+j1PiTryQX9tF6RtLPr4ZSrmgRXTnSBZvbBGeT0aRea1KkcbtU7vqo6D1LzIXEW9Xo

EYdR4JYcQpX8cs6KYi/z9NbuNyt+DVafm+KPWyXxnN
T/p28s/4v/4r/4L/6L/+K/+C/+73F10KZ1a1aQ70oisfPIUBEmmCkq7Jl2xF6RuOkN2XZ8lZn5Y30cX8

x/6hdMqCBZF9v07Mz7/EZorLot7ULdHZoM67H/qckYvodOaLReT0HVbk+xp1036xKtBkCt5Aqmu2cQ3j

RP1Xv1J6qnBYK5Iya+CrMs2FDtfvnUYfr52J9usvY+
4Qdz+zyd4xpRN4GddOYFsA9LqmfCuwEYMhTbmAhbE+40U4x0/3k9qfsiT/KPz9j2k5D1pThgVSXFlF0A

z3//XoEBb4CsybmY8xCf2ETrSI+J9n6j54z0Qkj5DyrYuQRE169dVbqCBzMem+exGoqLGvnXPeRsrMMs

fQSBURcVaQMRs9gI7pHKg3f/tber20S3/P6A944aF/
skqHZiQqiwIESHU3QJFQY72rdWYZ5c7kWdCCKueAwb7msYC+3zikdLHg8Zsj7vsKkWpnFLgFAkgt3apX

JgXYtr4TmcjhNGIjbS8TDhXmlT4QKqog13Dk2aRt+Ve/u3AHD2MlFBE2MCaLiCJnbykEh2Mm5I+wNben

g4PanBiI+JU25enaMFf3Zyv99QgU8VY0ocx5DkGeN2
HyQQUlwd3LQTOU0d5bRldgdTVMCbYn/BwofAzY1wJ16gUg030wtpgRPsPol0BLsiKDrx15KtB6Mivt6d

COj88zrhIqGxyURBjvBgQ5AmdiUhW0437imd91tvx8bdvOUG+9TyEciy+iL9aEPGhwP+CVGZwRPpW9oP

ZHYwAVeOfKu9XaknmRC9AsWiL1akkKwhPIrz9VKiD3
gwj62+ql4QU7UC3YmaDdPvQltwEhQSLl/4Tt3ImAAVwbgIWsBlDC5b0z1D9WttcBOhmjQJYXXVx88vVI

C5WN0C5NIswhhJfzSejpUyuSFH1HPH7djPTeDD+B3Xn/bwu82AAmJGdDgZY74299x/Su4RYaa4ALVZxp

aUfyxf+rcjkLd/JWyeYSSEldzFc7mXCz0Tm/x99wmY
HksTWDGb1o8QoUVowrhNeg1q2uIZ4jvxKvJ75OImt2Xcrvihmny/HtFsQ23uov8K/4dnBN1rgp2wtx3y

CO+FeSwgs+IqWM+YIZL2LkZqeJYzCin6F3L5TDUTKPyQMYF934vhHYwqbizLt/uSt5EiHYvTDliupkOP

XFae40Ded7cwDjp0/aRBpKxTXz/rjpAjYJZow1n1DV
0ZN2POG82sxwtviKtWxi4O515Wl+QQP2v1wgkfFm0J3GoZcgRgvj7iWOzbXZe+9iRHrV+yRKwu3ieasv

ZWY5STaQcqRRYCn+ToG7l10YAij7JRL1GDn/cnzDFk7EEKnIqShaiM9iBDgA7YeRSqLtuNjBxrDGOI2x

pj06RfOkk6RQlktOeI5Dl6Surk59C2PxHNS39bWkhM
G+hVZvbz6s/1ctCOekk8g96Jy2CXdmW+Q5Q4MNJOEruNZvbi6qxdVNwFfBdaFc1vBNVlg2PEWiLHP2Ct

s+h7t6aK0Ktp8KB7mbB+52xBQABCxkZfhmgHPT+oJ9glPz8fiA0riE7/e2ice9ql/2t0Odbl4cwmwMTE

Bdn1kJpi1EsA0y7wIEi6jPvR3aHyiog3+iS4XxNMMj
Uy5HLBj1SlNr7+PUSXshgogoCwuymqePOMcM7C4Hr6ZCzzCbKMRSSuB7g8qZEh+gvG7v8SjH2Yw3x1aC

cggsHsmf16tH4ZQRXrnRP9BQl/59AH3FF1dgOsNJvUTcXGY60Xp2nrxrZRWz0ha9jp7bILxsFBD1CuP2

yKwMyrbSYx87duKVcF5fMU/F0mxgX3AplgxHYR+YXd
4umD/k45G0LUnp/QkqL1FPFiPuws8oKNRij09O7VcMkO5ZwhjNB6d43Lc1kVLoTCks2T/5X3SZyZCQnQ

o9xQxTouJYYpoJ5/lQGJ+6Tosqzct7b6eY7o8ECHWcWGlDK5uLrzeHXTbCaSvsr8++8P7aHvdYU+Ztge

6QdKsh79Lde9iU33B7caTOadmP+g3LLxbL14gV6PTV
6rXQglKp6cuds2N60EkQShpFH/04QqltbBoP2ppiCa1Jdl3sRFGQW/PvSc0nxvws/R5pjMeeGMhzcSLW

9SwniAR42L9jyykO1rlOtJ/ns1bj742kkx3anb0n218iDGcV4COt7V16KiBJH7D5AC3ou0BfJwn1CjsS

1cf9J4OdIY95oqj+PKW7Njiev2UVEW2Ys6vkVw2pVw
0VGx1sns2PMW1fohNveSGX4GB9DkFNyEEYeYutiTJwXrPEPD/2EteESjnil5SHBkiMTeX+aLMwxXstcc

+m++EXOBzwuH5r5skhYCpmaR5q+k4tnC0ngUy4ojC2dUAIrA+Mu9xSJ3cdgo4gEAKEphIPr5Fk0Rop5g

VUDzdJ3pon7cW59X6f4W/vAz2VbOBlf56om6ei66+Z
H9TKL6FHv8Bn60Je5XnJLUzJnJWDTZoP9AAeneUZBkzw8sQ+snwzxdOsr0q9yBpA9+djXE7owkmEMWZv

jiWhDTQHEj8QxiZW4zfSDOFHrMQowYkFEomlheGgvxyLK9ongfwAqaPLSs4H/LWcKjuT+BKJgOzx6OAy

OivKzB2+63wnpwAAFLIB9gm5I8ImRakAFc4kdVnV5d
C9v1vBBHEbemMZfCr4BQZoJjRiz62X61P4RXGZ0TsLEvVNduT/IZT/F4HHWpjhJUAr6gNLKStRXThTuQ

TJQDWv7ocwKZZOIPGT+xsdrVMCuFlodv9bwLSsEXeUdxNdPSL/XcTYZjOGHCNDS/f7y1Bl71sw7xi9ND

y4RFubsMnbonW+00NvsgH7LbOMayJh+8T2Vhddzl3+
js2rBKkjgpb83q6xgr0G+8B/0jI0b6qCg3G/triXs0erM20HcnumzwlW6AEmKl/EFcYPrS6jeQlVfjpo

TyS2lHm2K174LpIGe8buMtvUDdD6ec/699JENa8NvOD7VPGXS4WCP/nHFI3E1f/S2RhrrJEAfr+TSA6A

53gz6C4wotMZpHTayK4zCK5JQIjxviJ/eDUtLI3aMY
79nsPY3Ms/wKovVaDgkGg7eb6o3oV3f53ijfygGiK1/lW+uPm0PWKT1YhBGoxOgjKG7UAjRuk/Jvbz4S

Nm9CmJ44zyH6hBpUxYOVwpsF3/1LrmjpMCEdZVEmWM1NmpizrJMbmnOE1KDTL4Q7u55deBQQlTceU5Gs

wO2Iy5tZDHoO7t3usjhdd1GVqXI6+6n53l47frPz/J
w98gUc7/uC1RtaQwL1Ff/l+l1ctm3KqJHjbuA49Qo+AGhsTWL/VQm83Yk+UaBJI5cTpUghfNNM4GEdcy

wkonsYFwFInJM16MrFoOR1rIyzy45oLFqrjO8E0MurANnRG06KZyl+4+KXpDENg2y/7O756FAw+dU04f

1djS3TfwcgyAeN/FN4HNXSJqnDDbMDoz/7OBfvVHKq
maIkDTZs7RvCXMO+pr2QaYfNg+LHnG2uR1jgPWO+JmyhtX0yDVrxMce9cpNtyiixG8Nv26GU6+CljHcl

Ooc/6yGlMb7Dz/+mnMxN/nffLlsTCoE0cTn5fvmV4fe3aM4nODhv3xg6KbksTRFhXuIuOr7d0q/S77vX

hV7m78dUTvhEZ6WYDryRrDelM0E2kRmVf2cSgy2Iwh
ZhTzbr25374QDAqGFFhOEl/1LOv/QWg74Gp3hMV/dS0R6RhYS0p+Sqtem8V/CDvljQ/xMIS4Lh1e9Oef

l170YChu+TSbk6FtKFlxRZiTQK0U3kD3nt/Z8rioZyHqw0ZQzDZRtvXdLZEsa+mDNa2Vk/6OTIPw3tKP

jr/ZwrTMnV7OP8D2wWz61V/T4jvZw2PJ3XJHOPl4Ka
SqS6wByDpVHcAi/M9F3HBn/zDZxjvrg//ihQJbJgKSmuRO8gt83qBqCevdyDNdYMI9il+smCt6rJaCVD

Mma6fz5LrJcBaxr1ERg15+8DSUAFwNG84oP7o+rPVsDAEuk0852LrBugYzh6mp0CUhwmaSBTlwUQkKgf

aUF9qoEDYBLQ10JEOXkjHJ5t6p2XZlIAqzQBVJe8c3
T0M4bkz3vnA2Fe9hQPDVXzQb88Hug51sJoRtWpXqnFYFPG0Ztzv4hglDR5F8mJ2/qsv9v/c86sg5i03u

rrWj7PKMEv47ZXWZUpOrvIkNjLmPj9MZbldPRcwdapG6Qiy+BZSV5rh0kbRiozwzBYCmMXBBjXhTbpfU

7Ku1dg+jD7fP9/DR7w18vAmFLm+pavlg46A3fVPBbU
7RZaZW/l/tBS6EIzj+OztNL9bcaeUA5+lGK+wnYGpIqDwvSqaG3kVlSqbjMvYSq0/segflTGwdjxGyho

198ol8ZmlAmtkyY5PpkP80Y8GeRFzRklBjAWnV7/pP/38TgzrgAwLebcy5ubd5H3VhDkN7sdU5gbYy3m

zOYxZhhYXOWh3qTkoWOiLx3Y4fcPUIWjpd7mRNwvyV
DUwFO1fiVKlmFhJcRNVj9+/odOyv9JZ5lXf6+KVLm14RYunX1gm8hkzeEZZzbQAAHoXpbKl4FLF56IeQ

2rXyJzKdsMF7CD0rNPaVbuLtHn6c8cl6fjYPsc67L+guOUebvvRPLLsXQWPTDmUSd9u8FWp5zZKUvGS3

0Q5Jz4hl5HoLbyXPufg+8Ipns/NSszATJtyqKmvRlJ
cHrcxOflFp2n+FnKxdXa1n7DX0OGFO+3lWbfQO3lWhGWz35Rzhs5UYIPjxwsN2/2mIdIzpq0z0ibe6uB

Q/jRYnQWR99a85zJAyGJTV1p5YCp5tR6UD5YReeVM8eDprQGpM/fLSE7NO28N232Ldo1QbzeUUuKCCJL

xIAzIeQG2y+mWqS3to1JAylAa9gpSbYAOI9Es9pVN3
n1s8U5ErsA46X6nHuhilsIpmo/Lt9UVh/gFeCvpeR4SwrcaxLdMb4OCuK+plpjk7hDuUInShU1MfBmFt

QbyvIiLN70BuoHetyb2KsyMrSWZPHlvtDaM93Ddw+QiU6dc6dgnOomiHDVlVMko5btYt9X+O3xvwaI2c

4/vKsPgH+Ufh0V8YU4TYnnH9gUpDgkn5MgxUuE8UfH
PePxfccZ6emlN6p/bsjYMW64v68LMdz+bn/kr1iF5H6/kFLqAvr6RvAN1NATKBLJoYv9X4rGd1H8vAGH

WwIc073o0rLJneW8Tzr5vqb+SOiJSEcoECJstnRan/PXwKng9+0obSYHFs4QWFkKM750nsdavqCXFmkx

dfvwxuZ95C5r8AGosNLwi96Y+/Q6lc4Zz4JAFmLLDV
5RtJPswyAmpL22pk5Wic6MkAwanZHhuuUMtk6EGrZFOzV00V+UpLA5fXp9VCJD7u5uDiw7DoERj5Dn0s

E3UbTtfH3fptCXvnmMkNgSmtkgTwomH+TIh3ZdJ5fswiYny4Nnr/rYBwBZWJzViyWIwGHjaLnCcMXZVf

L4szN6gyVicjVedeFvIgaO8FkPvxrln63+b2houRMz
bGIxywEhMYwD0RivYw1k3PD64K/BUOOpae8VibCjTrFg/mKu6a/OelaH6THyeOAmo5N0ou6rXjboLaO6

7MvrRNi/GUNk/PlCdKsnUjjugr6IHtIhWbgU7hcO39mvS0FmeHf/frxYsBeRjldIhmYZCeQxn/6x439T

b+3zePVqSFB07yrGt5AWqxtgSYD4LbC4C1oxilF4WK
XlQYcCuPIRXbN7735gBSi3KNkHxFzNjrYg9u3hXdffyuGQAMvnqbuqfbN9hJIn0ZJIEQ+iBG4yF0F9Sn

hoWVU2UtmPUEEsNrpolhyOBH8+Uyd3d8FVDnEJtEczHrde65WsyxX6E0i249QsmmZD6SB8wsOyGPY94H

vEqiFN+sgbtb/YIOjsZI9fnD/ryYLZI8J9M8qRInO4
5960ku0jADJ5JJl7m4A7DbOKJr0I6srsQbZS1Eruyh93UpouBK+Kk/whz9s4921R8DIYsncpLoCiVDwA

JcZ4Fh3wVKWY+kszWPMAc9f9RKpqCfS3YquhWGW1tA0goJJ6KPGhpiWmZsxGf7Uz/E5TwYIIPPWVEtm4

dtDkBHF9mGXh0tvscGOJ8KiLkPN39kQ6nDgElYN6Gf
XEXKqY0DqOFyJNvq7IpilULBRcUGi/RGzwQ45hxfaFTEF9bDeiWsOTgPLmi5KevVZC+ZfXrxNLJgwRz7

gvCMM+VJy7XHWTHRXabPguCMol/AI1dYaLRR/N1Fgk/vPEnvuj5/l/Hqut/3W/aXbiI+hfyb+DuR13e/

QuOPKdnYZwqAfiWGUOkd9pNCvxfCGp+GJP4rWq+G7y
qf60vbkC6zXiE3NLPpHTZp2HJI0ce1MfxYVWunxCo0DNcN3lEHW/yVQf0dSusVe0yqvzRFwEBoMNkohc

3ydAOyqQcdnRWqP3tNLyN60h/JXLn7+DeIZ3A5HB0LnJBe1CXtPFNEAV2+0OFCsQDFtarAyfQ80mt2EQ

zP977XQAlFZheKQpS3+b66a1DyI1G5oRWN43yjliCB
5uO/ijF/vappM6eRLNzdNauNF7RjAdW9t5lUoArqnMXYRjpSgEfl4F5Sae7jPoABpQRqhI85J063V5Wv

DBJaBa1QR1oq5dO40pY/WJWe47I3x9Q/x13iGpkAvF+NpqR7MFiJrdnNgKJySX+7YXKqxEprgEVnJ2W0

8SLkkARmRH+57bMdm0DuGlfnThatg/8MShn7NOVyyb
/mz6udP4P0RjW/TNaS+U7jXN2QMpbZ8+QbHIXidpZSV71FcNO+41Y7Qj6CVV3YA+oRjVkrBwqXhQ6lBw

xazEyECICJCc9L/BN9AI7rNF31cpcAYASrJrKQqpk9oCcOxX0R6F+SDgo9fM/670Mc7+sLz2b8NG39A2

C+wwWE8jE+xn65NcRMHhJOn06yOTgtapWxVwKSFCY1
rnhbjXuM5l04+/WYqPol8HtSsarAOEL4tRVkhzhvr2MjZ5o+DWWPQAT5nqn7n+HWBAczgvhKEt2fN6hB

/yDT52gY6O4BOfOETNjYWCMXsrcxx1Mu+6fnPxK2RciQJPF1kmtq4NS766ILUlOjjWCE0O1/xs75jwSa

YsyNseeNrYnF2Nj67VjAxe0wztoeN5NcaSkESQuDCi
vh67tiEp6SfZoXsU2+IiBGiqhd81hiOUirIRY7ZEe2ajM1HUf/UF/dwlg9gX025JRLQrHg+JyO5LZ115

c5qqzbxrHN0X3zgCjFO5DWy9JVXo4FOsDlJLHj2Ji6fo1aJBKGGbmd0xWkX7Ru2KWZiKPRbFIzvr9VgN

ciq9BYR5UbWkBhoypXJLuKLiyOTKag98wyy7MvEepy
Atn8rghHW7rL7SQCzSS4Tv41kePg8MK0yAuIaTBVG6xhapr/cbbhT0iH5dWkvhDvJro+tscLumFcG9Si

rGv/v7SmK2+AhqViWudGQ1HLZ3TO9chZcIa6ZJp63+t/nPlMe+YAp8gMvZOnW7bcL7WVsoL0NJ0flGAv

P4hEYALHUaLbttwaHKIEJgrbmjaGJcX/TI5uCf/Ys8
Fx9AdbKhg44LDgAI9JWf2tiyVZkV+kCtEK4PY72EMSnRT+u+SWuryeZRr7he4h6sEpk50E6zmYpAR8kw

KjiiCFtRwb93+0Neyw6sovKaKzVjk+MsPVSh5HTVPf1+oo3+NRoi29h26/qhM2to4Pj4vSxxKOkZkIiA

s5HX26m0D3AMYA7Vk3K0bnwwrq300jTv0JJm2JeLKU
BULQ6Axk+zGOzrWtLfUsn+jb5VqB+LtjqCHZl4pGyBpBeM7kJ202g7s2yrpcbBRQ3PVB83/9Cq+Idf5d

N7KzdVtUsK/M/a5oBqS6REWzJRjeCVbVGumjho+FDXf5FZqbGQtXoIUcMeAwA4YglCbx1f+S6Wk0w4uZ

t1nR/OhEx1aj1MzxZDjdrrLgPSQNUVGzGSISEfOm/7
Uf43ztprI7tOREZsRT2pgAxrmDT616JuCNv45smUKAmaYuTY3Z5XEofiCCIQpSEiMYJCnXwvxa2fpIlr

cHPjBK4P+swcKVHFSILV7F9SJUINbtUhd/DXjy0yHbcNw9XPwxjmzHL/tTNMtjrf5CvCVHd4aI6DA8KK

ZU+SObDZzeRqU0P1kSqMq7ejTemnVsxVn23fC8BpTE
zSqZLxckJPa5dKrAU81nu6j6lhkhMiVEqcSdlQzfIoG82TlF/9cnz7OioMTVBvV7olX1IPRSieqBovVj

MIdouOOOCmP3PvKkKVxe4B0TU6ti1fvjlo4PRoYv2X/uPFE/Wb5GDXaB7sJEkrT7CSIbEkggab/Nbz6T

BtWiZr6BGIYHol79TrSLGSLnG7Zc0aNnyLSOJ7BWs9
HtN6mRDVxuxfE0CsJGS04p1iUudteeRc9ojXCEdLHEZqQofWB1lR9k9YO4T5z9XiMAhciEHJ03GwUrUQ

VWQ5k+CrPbG22Hvnv8r2DLSRu7qelkuxoFnS5BYLtfyoDWfWn5Ch9bmLiZFKLXP8T0R0oC1R2/w14qDe

wJoqnoVYfA+hob+EP7jsjqWl3xfzs44u79QMFMiZ9Z
6RMbsryeLN1NpxaH2Os1VpgL4a4L4qU4VvM6ChOE2CQq6z9wQi37SFdrWh7fF3tKl+nlvY2R9oM8HdYF

b1/jzpG16kYEjYS13gGBeDalYwNerLOcmKQlVwm3PWWmr8B9ALLSWVxIAu6NKt7spsu33MDnXjG057gl

xfXvLYTHj9UrmBo/pm/SnfwRvGSn88VylcH2JM+KY4
LSN2qNXypatWPD33aMiijwB4UUbMzAYJWTw3mxsAtuFUDq0NeKXabnLoX7Oxiuq4JxSYII/JkiuPzbLk

5IZsPF7RWQeaDnTE9EwCOqg6yP6qvwPiatj2768+f1QO/Rf/xf+HkH9Y+V/Ui9kbECexckRfaXZkSK7X

QS5sh1AgLEpmLCMnCjrHGWxkQXcvBTCzJJOvTI5ITV
NvZFHaxIBnSJVgCTKxUzR4QBWgO3Snu250odFwsnP6YN4IH8IeHQH0KVq6U2cbPqTvJGNvHDDBMp3+Pi

7VQAXqbrLbYsKqANFiS43ndYYgsYYeOcMfTHItFg4+Av7nkyStVsdSGFOwMRPvNndWSGfeJYUaN4XkIZ

KmZn5oeTZkZR8HKiAEQdZaLOWrFEH0GHNbVOVqSISg
Rl8QhPatXRSuBpWVDuLbHZN6oxQzwG4ckmYuUnaCYSLaPDCoZewSEEpsGGOyW1GkVMWfUDP3Dn7LFY0q

UX9SpWwvD6T4EjR2dPIgT7jzENadP2G3bJHlS6mVJlnrV0AhXyajcYCcPVrcSfokpXIEUKIiNEZkK0GM

JEImS94mTK3pV45wn9CVeDXrFSKbNQA4xIYxKldQW6
qaYQRcEMF8ZxFjQGHhI7i6INW3XFSfRuk7d1IvcpXoyBJctaAzrYY4Ag6chVRbXN1NVcok3TuiAGxs6z

7ZRIGF5WCGPOBkFFjPwYCJ6kFUCCxNo4wwMrArVZn9s1xxDhR1cEJzcqg59gREz//oukMeckP3olihx2

TMvc+uWl/OPnXqrN/ee/3WqjrnfuF+DRQXkJp1QFxF
Tn5lVs3+Cn5NIESaNMDiLY1PFZrJQz6rBqZaS7VZ6lmdL21xrNOgxHV+zwMLQ6FXwJqbFQNUZ+I1vwDf

Pw/PfAzEPwTtmeVrqpR9adCA2//3sA7NdVVizzUvB6W6P7GMPiFVSqb9PFpOQUfefI0DiJ4HYJgkxUtX

nLg1MLHEC7JvKVGzyOUegsUhnQErS8t9RmLV0RR0PH
94ZR+Gx1dAfl1QrQkFsRBI6yfir4XnSjTIt+HyrF3SGS1MI3b2il1xXe6TRQNjNMUxpEkkXurVza+1tH

T52dKYCA3oUJ6oiEdyKTty8P80fdiAsjBKxAsYJaLo/TpRLw47Pb25Y0+mbXa4lET8xw2N4BhJ7MDA19

c/MDg0/NqqymM7zi7uElnH93c9u1K4v3nEQGe3F4+4
RF1d/4VCE6IA9T+M/yrl8yplxYdCTOd4//VRrZC17mWWeBbegJ/IZDVwvziRP+xMvlK0q5FH686Gdt+J

zQSefcFzNcGOJ8V+Q+7dtX0rZRj+e6PptlY7V90XYTLLidXxNKREhOTA4c7ySlj+jX/j3/g3/o1/49/4

/wd1W4PR5GojFaSPjpq5TSKODLiyd5mxrVeRfhzNOj
LtBQlKxFWgxq4W2gMR9csFFyS43/Bf0KBRovQ1W2l3vXhD9JbJC1j/nn1AzI2be1MnN6hOMMu7Wx8pCz

4FNNFclkbEhIebNHO31MbZJ2iWc+NFfK5G2Ee9tbzbngqwA4rt8KJvXSm19jUJp40JeKSBKZ5T0waAcw

rJq22DhAlGsqeiRS04LAdepidU2wRBc42r565H6+Hr
jdZXf1KyskTslsdu+iWKbDUbytCLAAtruS3Mnxs8Lgol63+u1hpyHdVmhX2rSpkWz43x1vqAnk1HriFf

gvp0PVnCvgfr7covb97s0fhZ/0sLKqmgHi59y9rxSFMK99C0aate6yfDrhuNL8gyB9qujBb6Kjdsr23r

ISYej6Qhz8LJ43kp6rdiUK6Qf4+tSoUBQwYDZT0Ilx
hUomGMU/sohvXi9oKtIJgLj4rWxJWOqtRkVPayPt/dD2jmiiZc4JQY+NzlJfT3XL3fRV/SxByx4LrO9+

sYt/pqCMsAlHFIxO0hTGhqa9JXLppqy/LfU8Yi/M3nr+H74vjkWUUZSDWMBfDx/IJ27NUHGDOW7gYyHQ

L7Ozq69zOGMPy11YWv1d5kvN32dt5hTxAwSSIcbPd7
z7oRoLDwJJ3D8Vz2bFQutExHoJ74btYncs6OtrXi91hZqgDPAxjFKt0prXzJU89U5l/1cnqez/vM3czx

tFUsVYGgtU2QxMkGDooIRsHuiU5v/wE5klTIl4vRrxwAUCE/pvYJBnX1vb6Ryh3+vkW1pgIWVKI4+6lu

lkMxtjiEC8a1V9vuDw21a6Tt2M8tjQtMHF84KQUCo7
bmxOUPR9pCXXo29Og5rHi/zu5J3WQrkhcOvoSpCyF8pbB3CuMPRMQFa9abUNTgfJol5LXx5wRu6c3NeQ

/sMbD5XcGQNb1elRUXLwk9xsF01kuOHpfx8ljIQ/Q2R6tkt7Xjlke82k6uLAujBXbS+wvY+lmPmZB02l

45Hdsev3D/jFGh6gN+Q9W7itpF2CF8OAabEVvuG/ES
69VsKN6lqV+tlikdlhFzsYpgnUiH2YajzvpGX7cJLGPYaoUyehF4FO2+gHyniirNkz2w3KBUecdEGGo7

5IHWbB5svVOA2CRSnMmbSpdljWbJH7vT1VZK/Inux0mgD0pfmmEATyl149tzpFflUP7X6tRA9zqCAKjJ

iLlmvmEWpvxx0c0Q+le9Yql8BROQuLcw6bKiTC0Ak4
hjlTgisJH2VEWwz6c5mlmuDuNo8FkAVmfydgQOq2RmJzC/obon92uoYDBVhaHrkHAG5kYzkqqImv+yYq

X1/RLAeQOGSWjY8b8blB/05Qg/K8EqfyQulmQv/D8Gh95s4f3FmZWcxk1fwC8C8G2F4nGlCXKJCd8qcN

5D86MjEm9rPyreJOxzlEqXY/u15m/ILdmnbuh+gH5u
2Eervvea9oKBeiSzY+LYGlXHiEHJN/qoPhdRv94H67cFfpQmtq5dQ+UjSiJZS3Usxdfr0AJeurrFl6FW

TkWCQfB3zIpTKmQCjxQ5C2hBDTuOiKd0vtxqR9DY4VK1Sw4hEqyeJMa5o8/lYR9pR1cDUkxi5JPSiFJU

xlyX5S1+WCYMXgGAyxy3Y1B9MoJQ+A5FMtANa1NVyP
Zq7EwUOALCj0dhJ7LgFT9vC1wxj5TiTg+jFMGiwppYWXAIEktxydhMAJxvZL768IBPMsFt9tEHBJTpYS

VSk+ahOZ1NdJ57c6fZCcqm38OK03ATvWA+r28zEs/3RZGfDBEQjwgqZQzef6uVLMVjP4d0Baj9Tsanmm

wXiKw9xvclXgrHkchlM6Six7yATgz8ZxK5CgA+vj5V
AnrvE39Bsr9k0H3wZsmQDx6i3NH8ihekYHkcDJyTTlBft1Y0yme9ztA957FUcPmOahmDbv01d2kNCTRa

/YJ602cNfbHuevtCqmu3DMRBrryqzUV5UnEvVwUJ+UNJ3BlafQT7KQs2YpCN7FWq6BPgltfN+Y61o/Si

4Nj9ekDyoRw1GjRzkZ08CEEofmD92RY1NBnC9TyHCQ
Ez661GHS3SEQxgA5xSmRy8F+jU2NyaqOYtjiIXCAi20cjHAOeK+6g4/CPUOUgVHKsABI7gQd0bz9v0Fp

VyH5Rdk/bJ2mvfBPl1XQzERkqZyNxBH7hBKc5/8aphDPlrKdNmuDmkWVLuu5JLH/WsixbbgZ5A8ReOTC

X6/Xn5tU1OMNMeQcv9Scu8Eq2/fADcVM4J9/8k8/HS
1M+iB2zL8NXWKqEKXF+oF7G/Y75xP9Kt4wkI7xTvtIe6FlI2CxkQh8iC4Y1NE0C+Nx56cPMgnQZmeRuL

ENDv5vThYIqBWBHZVKh60jNEc+viEMgJ0zQ21T+a17Daf50yiq3r+fLXedlun4kE1LyQq/YhmQI1ClF9

nBh1cVJAEc7iajjvR9J44/LPqFBUloWnG4PqC/Ka7b
Wzm0hcJksFS6D8O2Sxs4T+qF0sNs2/+D1wduTCF2ZISncKOuFfR0CAThBVN4unIn/07gEdyqMmXmQiUE

U8kQ5CU30F42FEGOPqzLToIM8s1uMk6mEEzWt8/Xumi7hKnjfV+SAv9M5BuRbfOsBlq1wW0fFRWbZWd/

3sn+KnieY4/HfDRqfAdJA7pILkYHXbH2HlZqOEM4XX
V9LZg1RVTr+DhHvWd55F0+6F2mSB8LEzKPb+Ogrlbh3i9WzS/FTiX2NDvlSAleEaRSWnEZs/nYnNUfGa

VoMV2fUXF7UlI4cUbCiDtBYVK8HZVn0JOmPxz8qFh6mmurqkghI+HsA/+lIe+4WDbqrpCK0gjc2b1l1S

uvEnWGb/KgpQI2+C4v4T8gG1DBczXlCaoKjRPJ/yAh
GrTGn8UuraIV7rxg2AsEJgTJRfOm+OmlAk2w9GfIso9ASGIfYdrXqmV+ylOf5Dxk3PngdDZCUrpPfAyM

jgug3ASZUqI+SUOU3BNfFgqTqOrZ3LU1n/jgMHFTws6mivHLd5NB2lkH9A9X//knSQ/VX1EF6tl2KZjw

5nc8mrRMM5K/dh5VPjjBDQRjt3/Pce4U/lrkRarBK0
IsVDhysKKLNtMcyzowutyHYJFSbn+jSsz5PTallWucrwr8LdDq1NqjnJOHDTwEE4p4CKRcUcGM/neur8

nCW33yk0YDpZnzO9TLnQoqf0CKSsSp8ZzqJ6V/lFc51KEKjK35zaNa3zYmFOB5OvgAe/vNgvEKqAYSXz

Nzqq1IDlRaiezaIpiXubyccQSBjwRMofDTpmfishlX
MUhxxZwFqMJcsacqRDhp4ZsLNNtB95hf77KahMx8CpXjnMAYg8/dbDQTVrbGMjRJ1cm18eh9vwWIVk9o

XWSu6y1OzsJlttj1/ibP7q1imTVL+nhgWyOG6UaUa5dYR82Gc2BEX1iYSIR98tx6PIv/KZok8H27aGr9

S9xkjlP8b9cg1hLkaq3VZrfo5M5gS8RLluew50Myup
ilGp+X88YntzqqobZ86Apc/g7i8QedQV2gakLjE1f3fpxC0cR1f8zRDjoB8LL/RYw82ppeyj9p7GqpcE

sjBwGn+zHsgJEJJIkpCOMfkYteFpPps555vid6AP2t1C7KMP0hoik6rtWu29z7rku/48Ex+jNGA0EBu2

GKT/Q4l/HeDCSMGkJIycfng9Eg4MRkO0kJQIuYkq0A
T9Rxb0pr5Vyr5gHe1MreR+NS5HooLKeR+zBg/1EtgPQcLxRsrjDgG8MW7BemX5oMhkYbtrT0AseN4DcT

tm+y2PvSc8Tcd67fV7/b5LgbsdH5y2SFFPs1VOm80yM1wIbaaQVXqBJXt70F1FSpsKobdXscO/ypLJBK

e0+xOV9zup6hs6pfL6LtSCmYNix7IdbbvAcHBwjph5
kr2fZsSE8TBKSZq+WlegpLpkQG4vndIiiWMcCaLjY2spWosmJlxrpdFuxZSt6+7mSys50YYPRKDqwzu6

S+fHxjwRWToItqIICQxfOiPRrTgfL/gibb3g0SYrbsBzZ1VmzyZ6foq9kpBg8fxJ2NLEJK6O4Gm5+yzL

4qRYPKHjqyM7OtJT0+iskBeMjfyU2hO678dqBDlHhY
XMs09HEjYTuNurVoQBJ46ZN/Lq0FjM0n9IJtE/5qlnwp0ac62FNL1XkPY+2nCdaa4P+7uO3polDoDaeH

aICmXtEHFX6n0iFnMikf27cCwu6RXpsfjFoJrqDpjYYfkKvdnbXQx39zn9ZGE4jbnDnWaz14c3ziBxAy

BN+389iCjzLRY7RZrDaItMtFybjdoPeqjGJZBmOrPL
bBotj+j3ORTDkzQZYvVJEsiREjjhzbo/9qc6N3oEw7+JrGf2BI4Ma9ZUix20QUfegXYOxyf5mJ/Eqxky

9eBsZ0mfJ0XFFsY9vL3pfFQJlKNUW4LS+PEXTv7TAIk/Vlf2KQ35LBzhti96WrTQ66MptlUjd+VuTKF3

I3QGDGOZtLyXPhqmy8AgOFZ8sJCKJHt0Lh/mckenzie/5t0g
OekUso3nHi8tAfuoHuA3Gyu5bT+XyckRsNDSleRl+cwtvVDSAiRJ6DLMEy9Qxt9UqbjW5n+alyi4htso

dvWw5UPURRV+LOWO/qIAWqdweQTagbNOFHbgLqLOVOQSzg8iqRoX+tK/Fzbtzea/7vheS8mAEiAAK5WB

4Mn/C11OGLA1KvldftOGHIMrbzE+mi04m2ZVIFVyoi
ZXPBurX77Ninx2gF/I4HZ6r1kfnTLAT2z4uDnrsCeIKyp2k5sfwphML1XdX8vLOYGIBmp3u1ok4NlUXi

42/2JAwfYkRG1J4hmlAoMBIEJGh4ypd3zGzK3FyO8nTBSr/nUEg2BRyEf71ckCH3JrKmUKndaYwlJqgr

4q3f84wAz5D09H9YXiiYZn2joy/iSNQDCRqrbHVJUd
hyWFCabUgleD5uCNLB6bLjb735y7QpLwCQiKVKn3fQTQqfHKObDDU2P5Pd18/dDEoQEURSmw2K56vOk6

4kfFzgkfboAR/F8aCKOnfNrQHSi0/bM1iSNI67Bsl7JyGnaFN/xPP5al+KshsbJLBlRvNlduzjKewPeF

fewRKJEtq2VgK1EDDrdPp393jrHgLkUulfhlNuQ/l0
4SesijTYaYN7a5+IfIDOYXWO5KmO+RvAltavmqJ04dnW5sPV3SbRwuhZ3cwTeJqpAux+CSmIWh/J1Vg7

9yPSC4dxvTHSukWnckgtoaoH5GL66z31BVSaP6gDnmmhUd1y5t1Z19gbng8DcZPUm6VAfgU9rYBZNhFm

IAwJkEfx6QDBCcpXgDRTz+MZS2/jIHXn2Dw4bWvIow
nCZzy253BqWtZbxtNCa5XeIJsbhF3xTgogNr0PXt/TaaJpuv4s+RYq1f0wl1kwB90G3yPEu8Nwxz90PU

yvyocU0EiVUXCVUocUPmleYyXiQfnDahmzsNXridCM7OK8q933scVJYfhKjCu6ZIFMeq70pBzbqvpy+D

74zsmAG9Y5tnHVOHET2GYM3B2vI5MjREaP7AhY/TKk
dKO6Sqne3WKCWd3g3H7EqRm9jBg44TT8bQjho+wWlggMQJ1FmFjN4GFkahw4uXZklfgQL2qTx+U+48H1

YY10TNmmpOwzJAZgayHrwJd8vY3b4yZ09ZdSl2SOmlmidc8g89OrVaCoho3Kh1VvhbIlHrxTm4tPx8aZ

6qZeco6IawWq9Zul2tx030RVt5NBEpvnA7kC5aOM1j
erts+6LslEfraxVanBeUhWDHylptGCF/G0q6SKgeGhK9F6og4q0f5kR/2ZKwxZP+QZ6Hzi+9yKtn63VS

5uV+DOuwI548U27UWG7QBPyS3erD90Ixc+erJ5e648mr4WtQOi80ZVeIpEfirwmriGEI5BCooKK7DA7m

O554BUGLagQ/QGKzT3Q9UqdJTIRaqSo0LPG9ZCbasI
hIpShW3BERK+QDRqFIj2WKhnp+5/g61Nm2LKcdpy3lT6Z++mkY+jvc1FpW44gNUL7QNNiRUYhy/bQZrU

R8OdURb2BdM1mmZ2Mtx+iXMepI7YWx6y3MynHVHHiSxomkV3J90E4h4TMM+Lda/6I3YQ0H0Qc83sijk8

D1Rt3I8CJFrh9a0wM2+bozTMGvnf/AvUs2eBapVwqk
wntb5hQohuEPHqNbcLazQrYe8JpdXTObamqccauqpxU8ambs6Xb+I6E8MvjSBDmLETDcY0DahlfJzIfy

TUObAytb1ONXbkKlyGKPKGFjSm0ywVPwtMZYsBCp/CErfXlT8YgxGIUbJ1mwR94w0plonV1+1j4x2bKj

yuI6ccv4IQ/3FlJngfYG7APvx4RP8UYjvbN8y94tI7
/o4/iSpjSF16rUUdKb+cJhYP9nUNPwbhCohYCTReDYAEMkt464goNDr5/XRI9yG1XMid4n/nyPY7y9N+

7NaeP1rrUVPp9PCy9TPFUoBJnVEbMmLsZQaOXoDx3UllKV/jsorCCrXhG5aR0MtCDAnVGTfWgZ3/SAC/

vH9fI1rRY0eGGEqdO6tmTdA22bthiTSWvN5oh4vWpw
99j8/O4y0ki3whJaP8wBNUdz5j2vCmqvYNx+2OGo93a7Jq2JDtnK7hmjS7TuxQHUIarWp6auX821oecI

PPCZvJXY0/vOIisTzRjto62dpmE2qrOpWdxfM+1CarKLneO+BJPZPriXaeDggTp3eWRoGOvK3KET5XhF

YzVawBIbFy/n2UMIXGKz0NICuAlu4m0O2FQUu9xSM4
hSm9wvnmVmnmqL6q6KHU8dfUKZQZBDmB0zISwQwotfan0iT5LoUx/DpGuBXvnq3tSg64MZl4nb26k6kz

RFmUlRVgBcDIT3ExN7OfazTFDfTJ2ib0nUEdBnVpql+j3ecUk217IbvIS7yN9qlySM4hf/q0HgZ4ZdTB

YHKIbLwQom1MC/3rvKHKpypZi/eDvdUHSrWh7B8/vS
1e31rlzhD3UFme+FcnY0NZv3BNqx31fudgFUO119m3TRNSoID4g2usmtrCYnn1vDHx/+0If554YjciQt

oBUV9lg6wQg0WPPJ7WCW1chx63jV68N8tjB8Sx2AJby6eVW1Yjfl74qlpp+ItO43OvKxdiXTbMLRScSd

HmLlGgPri9pun1c9M+iguzVP+hBbKzevRbvkgtIaG0
dYmKVkle446zvDLjnIAU2G485IZ+5s6n2Lg41/wtNVAouDOYiY7hkWY928USTwgG3rtWSCPdBfsVAHMw

wNNzgcRc9t9z503eaeNcja8X+KGWiJjtY51BGqx0IpLqfvTYSM/bVR57jQHozazLHiG+3T6BFQ5RdXms

fAadeasV5TYpMi5q0vOhrsr6U882NpKSkIjLjLfOcs
KuoNFlYYbs5TR4fBZFFTcGHv4XRlB4j+kmBwrZwZVbqp5HVtJLt8fHUT3N9uRpTIeD+aYSQuL6LTjgJP

07dtKGjsXHe4xgWvpmd2DoxxJa4UcGa/4KSzZwuQ2nijGnpP5sLhREN75kqMbs4hA0OisUCwZ0tXvv4A

DmNRdHEfFD3XHDqnNwiPqhP0y7yLAvfrS8+GqBIzUp
DByzrvoemdQinM8ydTQ8vOhKWiHlw/FnSrR5oPvrERxF1TjuhUkoARpxPggdCMFydnynqjB9+WHTBjNT

fGQ7VELnc1/df8oqSbF1SYz/rB5v/ywPJ+7+ZadHTSkGhHVKXvbKO/hzD6z8nUYERvG2DPL8m7/4iO3U

4stI5CFHzh8WcMvWIFeQYpHSgZXE7+kMV5zPX9j1TF
HWuFCmdup8e7CCXXEylGjQ8ILrI058jplmflh3nIx6E2Ns7RzZKVSRCk1LO1XXgMVxPmQ0gAl+6lqocb

+Eo6H+k7lbVE8JnFQPV0nqWfFLs2jqBhed+paPU0sLiq6qV8+rSLEOWD+Gula9guThhnnDGKXbipi6pB

ll4duPRHzn7KRMZGEssgi4SoV9m4h3MGRARUoepxnZ
ba01bDMlTJnzqgpKqegWYxzrFXjttV56LTRNb7uOzFqMwl5YW6fwcm3yLnZj9i6knhFwyn2d49Xe0hFI

ebCLvFCTpofZrUmLogdVsAW084x5e2E99Kom2Uy5ACFWy3SI+0RxRRY1sqWxPFuzB8X2WCiFvDTjtE5m

S7BQjcK2N7aTJ+m0v30kyDSYyHu/H6tHbpNHc2+OxW
9B5OwrlbrZx+/9Z0skhFFBHQIfiCCATzpUNuufv5Ndhvg4wU/XFEV2+PvZCjq+LA/Ev65CMB0onrgUKk

yTXpmAx25pgML65xr7l7tlgmEpe52VdqZYNm5ztjAb28lCVPwCTCOiyffUvEPHIJVBeadeA4gNUSjBy5

HXgl36zZoBf29Rmgdbkp3ETdRfiR1XTuspZKxN9+tn
04X4yc07PPTLvngakIstljWSbE61upLDZef24iQKo3i8E7om3iaJ3/qKbTR3pUUyqqoI2frOIrUuRYiA

ENCaqtiSaZg+BA/r3Qj/I7+Hoa/fP/p/jdWua8GYwAsCeupKeXKaplabflqzlnkRlKgcvy55y/uoe9rj

YV3IAnN3pv2jMYT9fIfvUMlyF6S/zvrIMjvZm6nvCF
U0XQtHN1+VxYSMxxG5Z95eZ8sl+wPEM3OMp2dkRd6vfRGhBLZMRBjbQYk2NIxMqOa2W6vypU4q488SF6

qL0HDUWoHkXjX6B1ec6bD2opKcCH0Yd2XKe2/aBQYoYxOqeVQIXy9t0nwiNWcvtJCxXVWXFcvgfviK/e

ujesYBtKjH+93VgnsQU5co3F8/ScQMxv/zRbFQ+sN8
iSnK/IUqTUAda0BxxVjeCr0yf/jLFDV2Nyns/52fvCrplwGPmX08aRXrT2m3cibEAIkTJbuvhb8LHhNo

KrggRxKc6kJrQa9kMSlnuMCFm6ac8uw1+odv7iIKzJxaGg1pLVismjejbrCUg4XB58CKXeKb2G3I1s8N

a/KQGhXXqqXlegvqPr7Hl7ZmO+vIDIaGjtRoi8/HMY
Ywkjefi1W9r8SlLaVVZ479L/1UV/sgus5HXpXn38guTLcE+uADBP6ZTxtvftRY4GOB2ylIIn6DJVpvnQ

17noN8hmZqj6LdjKWmuiFZ+EOF9RQJno5Bsw0Fh8b1DdczdKQbzPIUV7l22JcIsIR3I8vQUkYS/A578S

E6uxLah84Qpnx6lZ2vOv2JD717iWfAmpt35pok9cky
Z4OtXdvpvp7Arq2lBbFnnGu8u0aHkY9vX87fjikr/LLeyOIMPINYbiFHmiEeTugfM71evp68nXECD91f

duLrziFe8EIhaRHDV8FvG8iLUlAF3xmn8d5HY69uZnoSD5i6K199xiYKk7y8NhbRCFuKza16kNWlw6Eq

cI1l00kpj7ggLrqRAL3LVbilb1C2d1VuM8kejQTciT
ppq9/sbt9QYDiEYej/IYnCPJirpGlqT2GfE/eSyAmnp31+xMtkPtyw27bUgki1qHuF7Nzoj7KtwO+Rh0

R8RK4Cp1azSSaK48P9L0QnptSayuZJuFq3yOlJpVL0QmTWxZLHsTZ1hFotsIZR9IkAgDjSx9eDEcDYVf

3wmKtxCjUd2I8c8v5WfQmYzEqHJM11emgJv9sP//op
pmDJcogUL5Tl/v25iT8RfK2T9UstbvN+N97S4M6O2eiG7j15p2mT5BijEZuOo8c3fYI0CAyjez7H5oh3

aoNLluNIk9fgUevW+CX7JdahHNbvOQi8n5ugZ1KQD+lG2+ltirhQAV0i2tHjGZX7TLq04fYrVZpdoVvm

/qMcc5uL/ycTwKmtK71vSUQONkWlU7qfMl7emakMRd
15k6Y8tfRV5CWY2kCsxcDoYYpQ5p96w+YLKmLSj1BD8qWG/rkZK56tmCUn2zxEhp4/6222416VQGiaFo

pO40b8bJdOJUjVSVCjlj6diC+KukXd2V4OICl9ZbX3OWFyNxaw4jeN9E9AU8/Bf7F9gjLQ/eki/0zOKV

/whUBPypx4zYHoNJRiOvjm7vjyWJ3+UrnfJVZgr6YG
BSjy/rbJEHF7hEsTj9MMxtUIrjAoPZ39H9T26tM/ws0GAT0UTdUTj4W+kC3/xmyejtauMmT0W8HSFbLH

lHmpQBUuu5Y6F4KdaPncGtbpKAyWsH0vVEAukzwOkD0pj0WLPIzpTyevr1R0MAP/QTd/60p9t5t9iPCi

97N5OG0XZaIxlcTWnIagkcFiarYR7CUx0f+Y9+Z0AO
FaBH53o2er0feDlJxDbe4v1t380HhgOOBh5ruH6psFDpiBxynx76jDyyp5jpkaO9RHWqhLSEMK+aVc04

/KIP8aQsRjGJwvlo9LRSiSOBgBUEnfsij8T2X/dgVd0Liq//0VBmcyRlHDGn4it9yrmQo4B0aQj0/F0w

tdODDq655gISWBZVi2BcBE8Em8LiIamQ//lJ3s5eA2
2Etyned5liDuq2bLHga7YuK4a3bHL8loSYaHx+N+e3FnU+navj2DqFn3Oe3VEZ0PpPpi7oIPBbjJ3E0q

iUB6QsqTj9u/NSiSFp9C5CVZ3icO5UB1jq8o9z8akyyLJ/E5vYHfUAWu7LvPDW0up/VdwNYfkO72CLi4

59Cbv7qj275pmWwEVXRjWahAxaYVLuII+34NVvlimY
hkioCJH41CEQJsd56n7tmL8nujmH/Gor72kjB/ok6uaul01I9xxEYHd3gOR1BbWdpp2PrrpqSJmK59C+

pP5/nTgzFo9TZWJb/z2aMT8fi0No/VKWOSIV1CMgu77EEi4kbF27bthpNtvXGrsQ66L4U7CUKh+W2Yg+

OYOJ63qDRq7NhKkoZjZedttb9NAMEGS7sEc1phKm8F
tfreoeitKojVz59o2Br0P0533KFh9jkxrNy0LgHBehmAdjY0Egy61XhFN4q4bbuQia8te/MWPKfhHlGV

GrSposOuMEryR1VtyBJJxl2P6QOAmWCCQLn8yYFVIXw6eSxS/OuSo63sbPeNw3WmLSTQbtdaw1HbcW7N

CcQ1Gr7EOp+9udCOKgDhpXbE6ZTMYNN7NVWFLi6d0g
IGsde9tCoWO63WGMzoqLqwZ68QnsR4JZOHStl08Rg9PGJ18wpcZFPfD1uqU4d9sDaZx9P99rGwcOe3LC

12VWaxWZFjIeJL+J0A5EwJl/dsZ5xnAqgLdNgOmdbwZP07NUk2sIvpzF8fPUvRNdsKT2PUlsQQ4v0ilJ

Ss/43RYguplL9Yt2aj5E8tMXeKQs/KSoVobnii7wNP
C13q9/Oww35P9lPydlxaRnKScgPejQNB8VayyrdnPNQfkhcFts2G7rYv4GhB90W1cQIqy/Cg6O9BHUAj

Z3jUVsYtCy1CdCFW7OjrYocqr5VlvbDAN2EyvNXYQWi7dcx3ozpTKKp72Y3gCdPd396+DgjDAaLFmhF+

h5lic1rINHBBODtDsaQ61NfhuLd/5XgEw5Wk8e6gjG
KAHq1TIftrfogyI6liEyTQbuV/R4pvYiqPOjydwXv0pKO7dKUspiRxlfPfaPyf8yXZqx21tM/W6B9j8Y

hA8yC9HOEMLglI/J0RR8/ISrpljumSwIPYp5+HlyzMDH5DON2sPaU25mMPNit8yr3xaqHUXf8bUn4pRd

/xPBbVgl0RQxgPlyu91Ph2ADC0SQzw+vsh8B7nfoTW
cZ0lrMLSh+Sbht66Yhog67NOsJNOucpY+sMvXzDj5IqM0dkoLFZIa6Bl99iGOowTog2NK04yt3tswK85

4HTK/1sM1551cf5E70wFi5JcuILH7aay1Ejai7c9rTIhP0ymJs8zaN9teYnz7fM9EMmcOAdvUJgxm2Ai

U1g3zGReQRihwbJYHcN3JMYW40MW1NmYqvveUznh9A
aimtgT54EM3H2GK3/IjRLFEymSpN6lSbjvkuw6otRdQ9I8l/MtrHnyX5YLC935LLVNymrMWvyUguZyA0

aZLfCXknupkRIswWZ7yxGIGmnyOta1GT58bQ8HKlOFyAakXoElxlLwg54LGMzg1MH10wyieSYgAAdplU

ii/HA10HAjjiWaBgkSioMlYK0kkeJ9OFQvfi29IfZF
a63fAg/59T0bIzEMXufCI3naiJMz7uU586BPLt9I03tQm/5qF+9qh/96+jAn2ImTwhPogyfgyJpHZPCq

NSM/tTQOabQ1QK/bNcLXPwOxDfFeqhraekWr63RGJBt+JI90t3AmrdbksD5BKM/d2n+S3r95vaNUVlJT

ckBP08cX+gFQE7cR57HIsw5o5kHdbYdqD4RE16R0cz
7jaGDc9JZ5mXBhJb9Gyi38dYVupxpDxp/awHPAg0lU1FDQE2k1D3m9PWCc8+0hixSEPtxl4FN1f077G3

c264MxDXr0RNCw/kL7QSfp+Ke6gCMSQXU5fJ7aKUQGfdyBUBv9lvg/Bi2/lPVyOV8hRq33f1oEoK0vRS

eytJsHXz3M6d7Vk8KTv+l312a8+VThZBsOsAhlpDPF
K9XOmv8fJKecXabnA9HRHhwUAlCN0g2o7bzDuxiNe+7LRJTP+hcWck7fWpE0Y6EuVr0KsGNgRm+ai4yz

MEEWBZcvV+idV7K25oboes0hiPcze3Ru0F0mGCZKGKdIt1Jbdoab1O+CCQX3y/hiinrM089WcATxznyB

m2lwKxfqARvNYOq1Xx09Iy483YnkjI+++5VzjtPyGi
I66u1S8706xuZwpvQ/I0PL/ZhXYwhYNotQKIrWBpKyp5N4L5qFuobLZGpL6laQp2J6NWXfoFa0ioKG1v

oU1sRaIbkkfd3FTq/AH54e3y8w/NWu6Hvle0uGkyZD+U04e7Plf+isiCcKHlX/3Cl0LhhT2iZjoo5TXe

MZg/gHum38FTctSHeGy/X/QKBDV98FtkgN7os3mAYT
F3kpJJZwIU8aq5Dkc6hLLdLMR2PTtYHgrOZWANK+tGZbbWxebTCMhINGmIh/lxPXEr4Vik8Ts817x1HX

zavBUPlOMKYpVIhXJ4UdRzT3Z6/ytbbuey02ubwijbLF5uLyuQjjFs+/rN/F4Z+STY31zlazZFTjVOM9

eosiozTa80t2boa0XXQbMou/KMwtKySf8BRP/xX+6D
WOmv/jOXGQiwjv05LOb7uBF3cCeUPjUkgDEK7x5TAfk3+pVhUqC1vPEvs8sxZB3MsnLAV4l+ioPZWIt/

fqqCosJ1XBRd0bPiYkm2Z86WoGy5/+E0pk3TdZdqbTo8ICmCNg2LkqJ8yRbA860e7jj5xlMeC10z1fX1

7XPC/gYU04v2Iej6HY18EAea0iUi2W45tKZ2MTnVO1
0cMNkDZvW5R6IU/sJX8zBA0gG2xTTgADdK2z93e3AcpUfRlq1vyN4AuhuXesiZUlMWxXM40JSFL+aJMm

/tY54L4ostmMwKjlTDGScT93F/6HOZKyMgRf14rMyeoug5vWTIFjSLaEgZsbULr27jj3+YBvwKem8yIV

kXH1Q4pXmQhepCY3EI6h/Se95FkaHxzrjzvOSkQ7pi
B29PtDJ3gEMFnr7vir8NbQykgFcIgtkTj1FoyH/bMuTez5q7wxwNYFmEymGJATidL2XII7sZVpmy+Christi

PmOoDipCftb53EsSfil7hIqCopRfk6mRVB0Rd7zp/qmlUmeZI7eTSask4dNwZxQkWMjyEtQny/bHpJ41

kV9+rE2WACsIOejisBTTzu7aws93HbXUz0P5ojzDpp
BR8zuMAVh8Fsd8fRDdMeBOcE6NEjn+xmKdjb8jCiBo4qcTFDuvL8mitbDWEAmmzWmIawglTDj03Adzvi

DT1qoB/ZlOJpOdjlITzAb5l5fuAsMkxXOEoz2xEVWHI87ga2VwtjcSBuf19MUdrY/1G+MHb3YigqZpI+

Hp0iOs0O5IuEkhcCHzKshMwc1E9wDSlcFFYOBEHr5o
31x2ROS/E/BkX3/wqKrmLbQfQa0/BL3DNrIlBSI8xR2240iW47NL4VnXQXrk2TUDl0de36eoIMikeCDY

F5SKpFzr4C5TUB+IAGcRLeEMFtB0vqPlBnJJkq+p+J0+2/U48tAE3MlPwLs7Rv/6RJ2Pgm1WFTtA9x/1

Cks9mGy/eH1AWIg6Yp3PU9NTwzlQweB2eIgO6sfwCk
ElNuX3/aoJayLFP6URo0U4AR0d+SnDvt0tI2JyIBnb+nIBa9BBEM48WqP8dkiFSdRjSfWFN7WwBE1p83

4HWm9AduwMeDQuBkGwcGHXn5K5DeNV2MbBxT6juRbkFEWFDxMlibaByGXiMazZTnR8OD7edjTJT6Wg8i

KdhyXo6VwKPqp1fi7FgIYTu26frRkjbpAxClardF1u
/0mfbGnDl/vwnG4Zs39fft97jZZ+NhffYM5EnBjGWYn5eN7t+v1dqueMeWpRMpnniCTQuw17t1eIrb5M

sa2Dff7s033IY2eWo2cZPAGuBuANZZCXetuSDQeqq2rvS+rmwx7RhDQZwQDwJfPY8X29Nso8LQmy5XR0

cS7iO+5KXKE3GzJnIZAZP4AEgn070a+XbGunjudSbc
p9phQ22maMpgYohaXg04IS2S4Fa+e2SqmmuEZsDw/cR6hwb1R0rfUZQjdpZ9yBr+UzSfKm9v8wfXmLEa

FuG2FViyjqZxhBDbtjpG1l6KBLQDFW1xMqkQRrSDOPmSFrvLZY4/OmfrYmdCTmLuvxQ3kzBqWYQU8rnv

wjw2Ggo9PNBS/pCQBw1+Mrj+iLaa3brc/aviw12RFy
Ib/OiOBcwjTEQt73WXZQ16aGsTLFLjUj3DFXg26gs14hH0/s2QVi/EcuqBHVMqg5v6sBBIR47dk8x3Of

ow0VXS/FqSDekVE+Ka44Dj63xJbSO+RsUoDzWxZWny+ov7YpNymV/YBEmvWJwHtI+oT1w6L8JsiW7tXv

QrVWu5L7Epb3vnFf0p+BmBf1Mo23QV0hHPaAGXMaxs
GVXche6eBhDAdiD08GxdtHPTa1pHvOcNQsci3STB113uDhUqTh3v4tr4ljH1ioO/0EsOmiCk6jWguT/C

oQvra3cMi/Relo6KerCOAUKTyTKahbEGp+oHr3ahXqlYoBqEaVlWsZHBZe+QLSDkdQsAnqiex8rhDDBU

8rVYTsFRrSaYhwaXQ5JokzdcFRNj+B6Ar+39KRNcJF
n+KtzVKPUKyLQcUoql0qIxxzrGZBWz+TuS9MDYAtwglUUkOLz8pcvMkMeaFsmEIPA+ftP/UYbVDbfdBY

bHaOHFnykkjNyGjsxUHckRFFAAmBayU8kuJsOmm1QWRqozhAnin6DrSr6BNO2fl+gYe3wtMatnxjv3QR

t8VvJgIqmSu3JE+PDKOkULVOOfXpnL8DYI+ovlI+Wm
6vnhvZuqI0GjKLxuQC8OG48EsvRPmWBSIAyVpvirt54bCgkwvJTeM6JjCGzUBtV3rqpipcNUVdsqNs5G

e3mgdz0g7+QR8o5yVJRCGtBaiNIcblTPrQUMo9mdwYGQ/RGoWSX9XxEaTTNVRjOgK1H2Fgm4i1a0wokW

2oSSyONFOYhI8kpTpxKurhTW7bKuX5kluqZpgmfMHR
Ry9nCbx45sYKV3r8SRnH0FKgsL8sJ50aL6ZswofXj6xC/3uTKZg8Tp46VEPvubVPm5eKMN+bRrc4gsn+

M9pXrNBKgDCHFxtWXb5eWbmDWwbLu5d5bak7Y6pdZga5+Gfv93AXrHI2ObJIRxUISgHhXNfkEfDw82qm

Vd44KHmWJZovAdjznrMfOVsAjyEepmJ7YQ+oOj7NMe
HA548FPtNaAffiHFg7T9I5xSu53pZSy2R2N3sbKfTOMHMt4roV9AJoM5sdpcwzPHEG6Ynm/52v2+jDkr

dwrj4GKGYIsEA1caI4ZJQ0ZCde16xLK45biz3rNn2CH9S/RLM3Gx/lkCRuzzvVuvuMhXW8OqtwpgyheP

ZFrxWk/mD/MBhrxtvoZzEEpAMWavh7T+WnM10Zoh2H
1Ogu7h7Dq5ZZkIrYRU9wpqx0P7iOpvCfk+890lgQHFlXkiBjYS/NopcI4rz4/vX4cXMD1+vqhoa5q2zN

tM0Ple1CstJefxYzKBPMuC1dv0W8qmeY35jhQfmbZ+d+w7gdmwqCdLh7jcP3pXB2EuX/9kUuU7TtziIo

NbaTJmmks/uNNEZopTJHq5BqaX5oSopZFH0TCrguyG
QDGDoNaSznuqchHWLEda74GuB1csOeg5uYZnzyHhwdbtNBcyLcsqqOjN/KviqU5ZKDGac1cxzcEtuysz

6nj5ODgmPJfpD7L2lYYmJ30ht79xmgZ48iPz+igUqBJFqsj9MsgB10bqFa7t+n8O3j4YssoJHkiysa1N

CfxRsD8eMM3xgh5sKb1ujkW7sCMy0nMM7rSQqsRWY/
d6vV/uXiTEXJB9tSrdeAWCMaP1XC9kQ3CnqC65yRArAqg7oUgAeHHhk/q8jHjS347AyBlEfA0hYr4uRU

93tinWMGCXyMSDmAJ8xccXECLCCOAGKOgvG9DPG4n6Xl6H+K1u7srUHo+CNfnYTNbF4xiIb/Y3Sketyo

SNNwFny5Jk1GjuWqTWjXb5Mi81XaWAI09cZKHN0POS
5ppS8Bw4Wnins70I7W1Fco/exzRIBVdeVLGgEswrjX0/Ijwu/CGMOb+FgT4TOxDbFpznuzmjRuEOOOUe

bUERYcqBSxLFqJDANwGKMJPFVrgSMxvPf18PsdWSUOydeWwfWyMgWVA64tCoNtnL1xyxoq5065RbtmwE

j3GKZ07gTFluXc8ch9RfJ603mhGuy+paPEx1YLZcOA
DwJW7PVvVUZFjMIxJmIvm9PrUvLCxFmfrO6pFyGkJaWbo8OcExkVuc7vkHmFgy8jC+6wbSndZtbbLJro

rNDlutkOjwM5FVvZNr2Y1lGV6J+cw5K0odVO+IQCuKqUKuKnVx1t7wKFvtI5NGwGirlfK+M0D2KzxFcf

PCPLjy8k153mprK+5Pdn9I3kduoMd8k1VPaTZ30qYw
+vNdpBeaLqrYgf0VbNJAtfR6AIANavR9feRWTFT4e/1Ul/ECYENhC5XFEgimuzJWUc9+o0EzRGyUrkuP

j3F9cNwfecVzz0Pu2btoMDP13Efpn6K4OixfVrADvsQB2rQM+ysBkml1NSBtJua1cJArmS/etMqKbrNL

nKkS7JtjD5CJxEWuNnoey89yryHTX+BrkU9AsfE3WT
0V/u/6RdILFh7on+QMX3e0znPRlbQFRQUymloyYpIvrnaFrGOMD7TPNaXhW6D1xVOHpck3L2P/09SCIY

xqawz+5F0q3vcXCIbLGSYOY4nufOAnxENIUVMpPb7HDtFF5k3vCauq5Kb6sPyEA86tMjxO1jE2Aqib1Q

rBKt6WhX2THHAC7M3aBcbTxayHKyvRWsSnbKOngzlw
tz4mL01YjDClp/PsKwGWCBQg6oeClwnL1JS2U7kJ9PZiB3a0e1I79z0wY9CHt0MK/PVqGtwaAoE5X9xX

vTfGeSmyIne4EyQR6sJkuCUNVSvjXK0amurJBfBhIDei7ynRcY47M64Jj20Xh/2+cVQa/3RhWHg4RUMK

nIt1iDqeiNiZsBZ6KUEHK6VzlteugjWS+X30AR5pzK
2FKfWf190RbgTWZ2waTqkj3bLYmSHFiP/cncrc7ocRpNCjIIvWOg68E7HP6vUKHEFMw5/h9pjljo0owv

6Kj/WXbqG3OdF8U3uoBxO54V6pyXQS9vhJKWWNkO8vpQyE35XpUHTDMdTyLYUcpnZ1uNU9v5FCbKwvE1

Beq5xM3ydjrNY78qxbwMBs1NgYeiB2jUFp7BfZAKe+
bEQHj1eNrmfKYPV5+4TQeYOVDsh5e6Tk1DT4aYtlocMAecgpJhLv9aiWU3YD2pa/d/idN/8GkgF1LVD7

80ArloMFKD21M7IHuaUz7jlhLrgbIRSbBxaHLTrAiVcgAPNAcGCauY8rIO0PHgJ2DBbFDMlbAVZdz6kB

0bjS6k8CE+j6GqGXFthnQPWQmfMdTRk2mvSySVcWXX
l8dmqDiTb5TC8rEu9QaYdIakME/vf0JNZiivElFAfv8bpU75Xip46kqDxemtkxHd889EHy0IfQjD0sCv

+1m60HXTRCmQrEiLYWVaE7w0TGsVKS2vCjI7V6IgVzZ8Psfr/HJ1pfKBhP6y56py2PJnrvk+7S1QWEHM

MtS13n5vs7LOJnxtzYhpeN74/GoNHarAZvSWPBKc7w
FANtWXwSVmmAl7Aarg2TXCJQUowVX+5xfL1tts4Q/Nms2IBShNFQULTiU8FSc/8e8NpaxB3ELDqIhKT4

4iEge5rKcFyy4z5j//HsALTBFig8BJYSHWK7xq+yWDI1RSTk3W2cPe1nE46tnzr8hhwvW1chaBAw4GGN

KdiH1cniZP57ay/V1u0j0SfflcBG7lYUnRaKwSkLWk
z2j8R2lIWOHIz6jfrA9aF4sy4Zb42FmqANxcp96CqnB29KxHtPxJlh6qqLOxRRRe+Wc0FFGxT48p39Rc

tgm+nGje8aQZgqEKtK+TqwJODf2hp0dCqUF94IAyfxu/qsH9jOr/90+FN0puSR2tvl8/srd/WmU01khF

xkJloLhV9+WSljjQqv1wrIFHgMRNjl492PdwvYPvcN
KidyTabLC2mDhGtJYruPKEF7ZkRFGvZEtR3Sn4xOX8HeY7kNXwhBoCN3YVgBgJ58ba3/ZbxaLM8msja+

8BGVHSbB51ZdjqAe27n7fhP7FPHll9UHVq83bgGHCTMgtdYIBb4vIybceRz5XL+yssHHOg1ocnql53+B

Zj64Z5WxoqfVBaxg+RY1IpuVR+GDGOeD3t/2hdMa+5
jE2ykWtl0rlhc0jEzkxcqHtb134VnffKJ0sDmNbTN9dMKzHEfKYn6Fb+3rfxxmMVqITxssTACOwSd9v9

0pKTD+toQOFCHZjR/QNMPxbACd7p8HgYOKmfBEb3CxocI8TxjEZ6W3zJfhJA0rPks8kONmqM1QXclT1U

vaVAbTNkTIUCzkfi2BdpOiFqGv48RqEpPnvgz0CcD1
dlMR7lytN+F1zbttzzL3pgVHUdGsjnPT96vxgRknJFmfNQAOGRLsHNMr81xdCUe9lkkFSaMIUtVJWK9s

eroCcmBK+XI88uKNEcxJ7PAz5EcuPWDC0oXdztL0wfMJXWOnKI+wN0IC3c2g6akJL9uvYycS+jxYh3Kk

iIGX9dnaPy9MDo5nWFs6oGblAE/3FVOc3yLNNew6hm
wR+mrVBB6euyNagwapg+0Bd0Xa7Em0e6IcVXA8TvHK2cbqfcruwuBIFyrAHVTxZ9PwjqqkqZH16e4BGc

L3OrSHFUlGveUSa0tfCm31qV6icBqYG7g1UhMHBUByxpVLapSFY2/9OTzlNIKnlLkqnrcabGmcDBgAjG

2RDAYI9Gq06M0Xb5htExkUtC338P3iXrKXsiPy+tfa
nwQIhLXur3mbgeSIGa/Fk84zFFIQfc2zI/RsKrz8LJTHC0QgwgkX3tGcBcyJClHRmgWSjHZAgubJnWmW

R1e7JHmhAavLp9FAk7Glmo9D3Z3EZG/a4PTBEkJ0of+ljel9KkE2sH2ZNHmyKHt+unTtX+lW9Us+NAp9

yRdVhm5NhPb+mFggdpT5DKo5pKPN95IanctWw7hJ6l
tvM8mlgqGqyLf+WNHAeCCXonSrH8bnpUfUcT5C5CuvZ0g0hsfu1SGnn/J2JPliIXNsPP9OzQEfjjSEYh

dGtMGdMBB4x8w/I5PjcZt9JPa3rA086GlEuuegjm3BETCHn7f4poYfribOJrMFCl68PRBV8U2JuCAnoF

9e+za4f5x30BxLq+bG2cM4wtKM6MB7jb3prh+VBoeK
K8qrk2GffleujdNU0fUGgG0b7I4KHTzUjUGJ+R2elsWk2S2vFw4bjqCX/J+1Vklb6VFW+ubGaPQecZdH

mIHXQbDZ0V65eWRPbwTsVHh1gFyjoY48fbtxo8XeUCOoBphXLWKsswnxZN3c/8s7/q0AC8DuA+G6A5Ep

0MF+opHtQVn9dndy5W5UCAyRVOmbd2aRdH7q4pxMtW
pkiEaUoHFyd4Iti1glLnivJaWFuXV+UnKu3s5S6mHb5q4dpYYwg0rVtvSDPH30QvoIaXk43lp7akqZmK

AagGY4uEpdq8a2Iz+7vCEAvFw64UouPYOcjI0Swabt2AnV9H6G3sDbifRj+2VectMxtm8hxC1WBYQ7E8

woDAewVa2lvJqqi9jALRXszaJrwO7FxpVIifBtjP8O
mwOOC1cguxfTD9LWGIsp+mPzHAkYHHfqbbSZU5uZsT0o2XDlvXsyYVFXJRZ0i4/JcTEi8hZ8df1iRUg0

OnvYXufbXlbDL1xjg23a2QCz0jJtKhfvQIW0i0Y8+8gquzGU2Syxy8xlv+yq4uZoktwK2r1Ron26NflM

6MJxIhlu8Cm8ztVJSK8DTkOJXa1u2BpyIOrCvVLety
OfLLeF7qTYC0edHMM+F/Pybfs+nFa0B3sCYsNLd74KMx0saAt8uBeYDD1MeIRvhKjRDanxO/dm8MHRj1

AFFa8iPS8Ib1SnKONLxVFqXAl6n7HPOlxGhA5G6Q8woFUy2bNYtKENsmPjuGQ50g11S3d2sHsFM1I508

S3fvJyg0Z19ZO2lxRV+UlfH7auNLO59ywJxlztU77T
6hyaat2Yetug2Doy+l9v/Qp3/xJMJm7+872cozcTZGF+IZNXegNpZXa2iMJF2AeKKVH6rBfOtpxcdyIN

MMN8zevUb9nGMFyHjMfc7hrp/7f4caN2vKFF1pHxHzMuooyHj6bS2yK5vw3gKBTC92d8caGQUTSxgjxW

AhSgFsq20NnY2OlVu4HPyzKECQ+k/7v8TlJ7l0M+9V
bZGQGkgk2d5PGtWviE1Er8sbcypDV1dEHI7UxdBRblvmpvPv7dBlU/tVevNWlkhoVD/YALnxGDiDchJu

psxBQTFcwcnYgqVDUTmmKoofL/+os2+pCDZ1PYFC3D5oputQYN1JMuSFYye4E2PtTJ7bs2I98uSjepzP

1uJ07arBksHIoP2tUwHlamw2C6ZUL7dzdaBvtCd/Jr
dzYg/RMkh2X6sQuBMhEEmcNZawR2fEiqurEwVeiMj1KTAdN9jbJpq54CTyQv25KffJNHD7gG56AIT0yt

voZ0PX+M68TLlF31QD3xYJ8LtRB6Vc4NJ/K/h3Vr8Xlj/T0A7bKZb2bZJFco0Dqp4drghYTXAPt1VXlA

50CoHc0Sk3ZxOMOQEt4RaiDMXEg9gL9vvr2F4pv4RS
fh6TcJlCn6EJ/7Z4q0INCK6p5Tkb5IySOlKQAdQ4vf0kAAPQbZTaUinJo55Bp0Vwmh60Y7JbL+CuykaR

EN0ynmWLOl/qXHfeomMRfX+AlUdB6l/E1g+Zu85aMouhob/qlfZ02uLhgnsdoki/nEXLW5MtlHSUczEg

eAdEYALtT4gsVO36a0WreO4Hi7EWSX92SfkwrZldE3
x54TndltsjvVHN2KOe4CQGz2PCjQbSSoHHq4iNMg12au1f7/Dmrdp0hp+BoSuBgXXycXyzTFAaS2lGhI

hyrQ4CWYcxVHUxXCjMpgbW4GUS2Mg0bkL0NlGxjJRBWabLuJUSVoZNgSoXNUyzgy1go6uH4kSxzuc+3q

RkB+wae/sHrYjkLJTLReCnrSkWsvQwH2iMfoh1Uro/
9G3Vt+RfmF/1LiPCDrEAHZHhFcOQ2iWFkDKGIpvSMZcRYIpeRa2hPSFK4rKuL3ReDYsYET4IE6s770qg

9wXpzzYr+80458kr/qE9bdd62GFdLaa5PtWY8zlDbP6uQrDpovb2fJFt7tVNhBoTGF6PIn279SEg8yHE

416qFY+EzpkYbWvoXN3l7CuMoAR5Bj1ue6FbClCv9l
65/GLK8DQzd9q2ovwIvV8x4qwOiAyrDzKr6LI+7Kzlr45lH5K4JMLZbDC7chtiSsjT4VXiqAh8feyi/u

wjL5IwSLaahuZBHFLVrE2AAhfu4cJX4pojrmklJY6AzQIr8zeQp7Rr4nK+d+O2i8pqV9kJLFnYWVGEcz

XrY4wlkNk/9O1kS9mmYxeRYX6ELsU2tl8oQ1q1XV1a
2tgpVeewhODfoxH62MxlhxsaNYjxLOSLOp4MMbbrQ44BJZsNYV2/Vw4y37L7oQ9BE4Efy/qBdUAak+DL

KxTnctmwG4lM0qN79Yp4b3+NsXskvgEomRjHNGUcYnC2NQvldOXBFMJvw1KYWU4ivHiXyMzBAPFOmNeQ

6p4w0nyPYB3bGYEw3eXHk+uXfIzDzg/60jaNrbVOhs
iGlxYwRiyQDOETavfacmCpEWdgPacxbSEhv2PBY/XkCpYYMRVXzROS+KzrNAD3BdqTcc+VMbyyvtdy0Y

C1UqR4x8H7riHZdOum3aHPgPmBaZFbAmz3QLfGOR0NcKjUw9c5/L9DBTVZUoeJFeaHErR8rsonA4+ib+

nJzKM3WkyRDkx6lF+n4ytHue0DnrqxX2XW4vBBVphu
BXK4SsmORZJh8zybJ4LKH4zLHrNZncUHzMZdQvfIeiatdQ7y0njP2rF67koNFmfKOr5WztGJbfBA88AH

OXewsNjYNqL5Fb7PgIt+64HpKHHZjkMO+gmYk5JJOwbyuLo6YqUyfCKg73ioM53tWVW33c8rcYJB9u4q

SoYwLmNpVCdX+ZW6DwQWqg5eYdaxlacw8VJEMv8E5y
Kce4OpW1gCxhKxycB96aGWXnmyybBvFQ/1uLQcVVTzeBcbBzzllKkOz7jjp1c9Y/VgxTSYf6x5RCvPOt

Q6wK0BZ4xyV3K8sJyva80eAaKQNRsJl8iqrkcV2dRh11Uh/J/5irr86tA72cInLTuReSsZfNNwoZBaxk

pdDigRqkVe/egq9CZK7ehHDxq5UVqP0evbRhKZjcIP
pP8b5s+/OwUPwG9NsDZemcZ6L43432F5gwL7aIjLAn9w4TonZcFGkuu8/Fgq2yeMDM1Bi9UaDGtPHgMu

FbUqvlROVLvvOWbsY0voOIETb3GkQcdq9fu3gsnuc1f5UErz3eAoIfn1/v7Cr9Vfhxss3aZHGI5TGcPb

q9b2RFrOCCGSSdlE3MD3p30Nk5MQUKiS/oFD27UDTa
6RGWiJ+1P1ltPa/Bm85mFgB860SSphEgUHucNN6/gXm3yE9pNQUkr53XbLMFOxZNM8RzhxDKkU63CEDQ

UHb2oXdQzD/1K9TUIFuCrsnuOUEjNjZRNwASXkuRl/Pmik09d+HJMKKfKrgPwHej/GxINq0KsXOnxN7B

GojtBQcTZTWQH2GDeEAemleXNfu8uktoHGX6y34+S0
Ji5ENAm3c6ge+HxnL4mpvse1btbEkjS4i3PpSj+uU4urfWhXkRccNWqfyU5SAirHJRO8vo2pZYrWDVq1

jhjWusXsi/SjxrXYGhNrXs7T+dvAKdl7YX0ZrLN15iPHYJxuXLWHgvZ4gTkwgh8A21RAuhyi73aGikWh

/0WAQCgWputglhED1IFWQiV0VOnI2xwfryG/7A5Gm3
IQDC2Nyi5e8Vld6YolLsI36PjqXR4ASKJw1Prf1tkRFssGdhQb6wQ+e/8+dWy/KtcdHbJzMd76SYpC6A

NXmA3tRS6d3MkWPPgm2sgKIFt/UKv79K02C/M5b1pZl22ahAURwMqIaM+AETg9JIBiVYJ5fSxOXzLosh

2WaNUgo/m8kDiWFRzrJtpDE3EyIVmcTVtqk2xmz9j8
K9bcyHh4I0puuEjpBwhhkLpej+LkxbiATY1OW9/leb3D/gUd5PtELzoD6HPc2JO1c7ac4S9KBC0eIITB

sneZtPCBpVhjfofCiifxi+0R8C4EziBSkbg7C6/nWAfJMzcd2yc53M7YxxUGWG4AdRQEkH/at0+8lrBX

RewOkvTx+h9XdUyjeAxKBiHvmMhY0ogmmyy58rSlCt
tv31UoiRCb9UOngHTqI+W6t1Ns+FLiUCr8nldoxFNQdiRGHaIl0UFWzYSvWBvIpM58TXlsgiKbpc2fhL

BE8le/LLKNkRFj7SOykGyWeZYI7bKTZALtY5OlV9w87154cvRELQwq+dxblWUfHPceqbrAQhrcY19bsi

4rIsfEV1j/wpv4VJUngR4EZ6crJsOYGsSiBkMc8w3o
4RZ1a1fY7uAdgBSZmavFBOpUn8vNNrh9VRyyBIOd04ocaZcqjYFFxHsrLxPOs4l3xYejZPtnu6+eKC3A

wktooczRwayfjRx57MfWYddx4DbmaohnNTw1JrsTvvg5Q96/UEMSRX/B2D3QtXz3M6KfJs2fOhlDDVCo

JuoTTA2FJ7rJaOy3T4sWd1n/YklS4eNIX/V8qfBNCW
epb0v84gMzIuNVZksvlb8R3AaZXbBwSsK/0piUUTtuUcMNd8lGKnEHK2+T8Uy+uedHhGwRgqW0EPp6sk

M0Q+yzFo8c/r3N9v8Lr008MKDGM/MjbSFdoInSvEz0N/sGv00gLCJGzWpVPaRH1sGAjAxi1I++eWvTGC

wZrNnUHJesyr8uEELqlV+dm05LphHJQkg8c8+NdFlS
UuwgGm2m/f243T56iSkkGm1xlYq3xeDWssIUHTU//kuyO5z/3+nxpa427qU+13O31fvz+06QMhDf/caj

Bev6GQPwosj5CrzGIHM7XVbKwHvtU8NDzb0LYHMZzfGTixyQA7a1IW+J6/f12Smum2FuVop8WBgFlKMM

chJsjlhGW6X2yFxcsowLAmilzFUrn9KHFkwgHqceAs
ipahHh7fWbx6WD1A76iqSIwag14FTy2ziolWx9M+4YsvpRZ5lwuRJK0rYxuartVSwGm9rWC9RErVtVny

NvbEApVqF4FWyAZtfcSiE4kXrGcFTQiiFLq2T4tKj2paIMY9PhpL7/jxOni4ShLxJQ1Tr2ruYMUt4yyS

npkao/2tu4jBdyngKfnkDjdJJfMw+7+1q2G+hmjMrg
tVYhAaHYpA0Ki+NovBx+c+AbW//0IVB1oQ2OzBeXDpcLmTaqhFmOWLVQiW3A/5JefyaXuNetNwq3Sx7m

sVy+sboQXs0Ra7Mhs5NGYGdwCdIoQCl98aaWk6IDRoo8UmqcRiC3Ihjq1FYSVrwY6dgZlm8aaYARieq0

rORm1RD060mOEnbO8EoSeUu0heew/ldDu/n/2g55oG
OCUs3tphYKhEdchTXDTQkJHC/6ZESET9M8vZrZJ/BTqnRZPx2mdzjLr6MuYY0nkfuH5qTEPXZSj81JL7

bRcH6yWou3nb92+DZ6uJSe925lfinT//5VFklc+K64OehoATkwU0iCJjyX9yz23fiIYHdGoFlUaqP7Ip

ierlR09837T1Sv6Sp/wYYXkJTEW/32KgqJVSjJBNnL
plP+eD2BguILBRId7RiJbIL5k27pSQX51Iry4jKZkxK0ztUMYy8w2cLo+r2Nd6OzrocBVS+Okw6QZ7Qo

om2aewssRMd2xpf0haZkXQY9eB4eVv7IDc7sfQvHsLy8Q8tdFGUUEaP0dIMUc5oGJDrqmJkX/+Zr0K+q

0h8xJaszlB3PmPSJs612dlD8nUnk9Sh37B8iBdfUvA
B5zJqb3/qzmdq3yslfaCoQFfdp7Egr8giEraZ0D9yL+KJzDPvi47QM7tdaQyDVGmXxBE2yzLQ4lYvFYd

6qK0uvlKBG74RI3Ra4TGIA2kvu5dLwymMHIwt+Mc+8eWk2nJL/O1dLYyp9hsfooo1nic1kF0n94b0Ov5

7arRgPOh0haUCTgjPqF3GQfm67DmeGFJLVxDWWKuoD
vk2t+0JVGk1T6FTbsipBtVyCDz6JHrzIafn4Hs1HEdvtOUMVJR18vD67mN3o6umKy43v9+bpyx8Iie4K

/UyUA4i0V/pPfr31nua1RqsWKBe4yLNgjFu/f/ppG1lkIChkrIIYKarWIXn5YA+GYKiDqcmhh59SKPbr

Nxog+7b3HCMWf2AVzRMeaTlQDxdjI90oa3LKTOUixg
z8YglMkAch4+j6p1fYxp6KuhgK9KjMJBE+QohckhWBxKaeY5Z//dJRP5pgITBmvK8a4nj0dGeveLJ+Nk

+GVT1/ennZEYCrn6OftKydzBdHkc5s94UHFLo8T/VynZEBnjjR6T/faQHqV9WXfP7uxDzzviS9cyTABo

gXu99SdhTu9Fa2al1kUE3lB44EpjeS3OXbPx2o0x6s
otpe0bpuI9Q3UZEBNLkEeLEIvQfrZPrHOyaiAWAebCumE/k3KPd1fcN9ytrvB3UZRMSFelQdkpcKWUMM

dDL/oOQOJsYHpAHbmaTsb/wSCEaMr1jIM5IZ7/p6ehSPsKAi1apjZvhEsVoHQ8bK4qfFq7MQpiCJ8HNc

GZ8w0iif9ULCtA6hxymKB9CCmaw4Pc66AEbcfr2Pal
/F9t/sr6U8fAVZBRjASDKg0ulV+rFT4IQ3nBdUZhYMfW3b172HpgRS8zW319olXxIQiCHu+eKC3N8ynp

CE+Rkn0QfFJG980OWxLrnyRxXnAsN4JI+t1L/PQwoVYPodl7P9mSHm5BC7KRXWKEMDCs7+dXmi6gwCOz

whTKBM6NVv2Fxvb34XxOKry061ggDAY+AYnY7wy6jY
loyPHVpsI9P/y0gazT674YJV1eKYEpq/AVCJs816Q2kijfuodztCkAvY2PkQdhZBh3s+F/8ofQJFbAXU

MX8CmXWqfr60/WVOGGOXAz8CceHoCxFQsvag1QGOMVw7wsB/ZoRc/z7E2o7EkCgKJI71d8tHxzOYsTj+

+W/ep+/5mLiRFu68+gK0GyW1CWlz7EzQFy6N0A3Z2h
ro8hA00SemLXB3c4Xf5f7pjY3ndBkzz5/bx4cEJCXHpRgjAgu+fGPH8Rchz/irv6V47CcYxN4DMJoiNU

bWyqFdTGOc8hRARRmN6N6A06J/WHmk3D0CRpVrUQ+pyROsA/CQ2Li5y9qr008Hq8rTQO1OP+l5BRoDB5

jN+GPP3jM9ND0UozzNJ+Z67VrskOzsTevdV5L2bnri
HskspHsb1kPrn76nXzDw/CZJKBULfjvlvqTNVaGKtPHtrM/UKFN1SiIbb83fGeQfoa3uxAAggKum+TxY

1x+2N5eQ2Q6uUqEEVfMq43h41d6DY3ZX9V8Ct6Mz4CAOC4vrTxWMxksHJmFfi4maLHnWz3XM4MtQE8yd

hPxkJTfPRhSnsePDMG0mLhrFWr1MteT0GNsBbvMiyl
q7ajS/migZmmXARIiANxYAgW1icEad+NS+PkOSopqZxCuDHF/BRzoZC49JFltWD7ntk6xrcsDKBdInDb

Y/h0JlWhsi+3vAuAgobOjce8R/c7kZQ2saGaXT3xNf1GvrlRDNuXXunIbzcM4Njbu1CBlyrt1YKeAlXs

KEx55DLaGLPwE/O6Sz5OP80sDtyIw6Qm4Ppa18Jtgx
9FfVCYlXAqLFv6l+cgKGXf+JWzsHIKskzxwwB83vZ9YcOTNft6kf0U9jNaPQrnqp0LqASm1vz3lVmDYV

+NZnM97aSKj2712nMEJSNxYALJZV0SObAorpqtot0u0pI3riluyuMroAoB4/SEcAQhY7Jh0j6vunZwSj

ML+kVPGqi+e60Huz7wchfXt8k/i31lJ07VNIbXJO3U
MmaykYUKstkUJ0tuYET+d/z+ezY+sHnOosI002N6ZDVm0B/w3loA5j9tLNeIY8IV+4GWrUUOQTPBjfEV

o+71KZFWO7xrSZ/fN5gKIwTrvZ7dcaP8erdtuzn2pdFCpyzxBQjxwCgGaWVUPX/VweW7+OGodiy0PHIW

0zdZ+BKtdUw+MoBDGyKXpM/MXvgu1gTXa+CCKnZYOl
lgdfpWC+h+hXyahZuhSAZKU+s5YrmpZzHK1YL5OURLENX1JKwq2BWhxX4fCx45Ppbjc9XENllEcXlM9h

iV9auCs7WUO0Bs4tVpI+e6eeUoV9k3TgyNhhHCX/1T6a3Y18KGcESRxVkSGHUigKoDwD5xBpnsXvsVeH

709XJQ8bFrIf3oXryo54UFcLwarLy/AzRx5oegq37X
Q9itVoMQ9LWr5iROdSmsQsY6VfVO5UAb9Ood/3aKwVBjVrLudJokUzKpuo5hXhfUQhhtufjb6g5OKFq8

3aNT1XdNY1JAJBLM3d6Sy3xgjYh4Y8G02TAEd+IzREu89Xg6uaub5191z2YppthNU/Y1hOfoirf68Jrx

sXifqxtJJqeSJktC8RtBGXePtyMS+XaVcvMJd17aOk
jk69bwlmw5KXlWu4grdz/+HOwDckpJ0RjtudVjsWDN27cbrMJPwGb2EyRmtFAbJPLhGd8y9MUjXH2yF8

X24hQE51gCHGXdZvsxaQ0z7VJgFgde2FOb1jKn112TbiCkHVPPm+xyGrKZM6ntp4/lpHnHCIcJv1hl27

Vt/mVVhwffNPWRXA7EAXIY5bG51V/a1og8Eg+LzM3m
oMbJLHdX+kRAStTAtuDUY3HXnD047R32wpY1AeN2uI8swWSevBQGpT4Rqa9rhUKevJuo3RFuma21YOOJ

bKNp0uDVpA+0kuqPO1TzikfpgJXwbmT5vhh3stmLws6h/jIKBz++PJldHmDWRmdMuLymF88rhTOhML3V

DC5fBh4EzS5nVhiGiEKOq4MxoumeEnwpsModLY4JIq
of5ZzkF3jByFxNZWG6okhgS2hhvlq8nwKoNsNTzxMvf23CIEXlJfcnK4B5BhxqZFjsbBw68tnqsjFyaz

WW8EvPgnECw62nsDu/24nr7Bowa4OUqLO/YLkPXzq8o9avSB1kQXTfFPFOOjIZXAmAGq09Qdi9+UuVWa

R9+CuIGHs1j74xqfJbRn7TbzaOlrWFTBZwm6LjagVP
aeSKVAsJ4Eu+fU7lg8AFVzdRaLph7AZT8+yTGWcyuMGMPWi2lsjmEkdVnPwAuFYF4ojyiY09AMzNtzD1

9Zt2259He+lHTCLXT7fsHonwem6fGPM+GmxUt8ipoNX8FYbj8JpUnfAm1DnymVXjvRVqNoEJYO4rpXqp

ihU7IbyjFceeHVNalBJQc4Agd5qyfp+fCRvkXGI+0h
scoH8A+SR+tPX6zSBK3lqWG80GkSTJ4+97y/p+Ae57+VkaTLTpB+NmaNIVsBSSkUmYIrBGSlx2kpQ8yD

n5RHD+CCmCX1yvFiKG/4QbVnV20ktjyH9s3eZDKQq0eBwSFwYbectXmrho71u9SwDL4xmW5OEZttme3s

z50eCXwsetD7bUFoinMAlIa6zq7cMALVe/zCBfBlpt
tBOIg8xYtZUrSuKRFJxvec6l+swUm0XMRQD/5Gn53/RbaiuQM617DEtmDFmqdzHIdHrZOqrasC64nLvB

Q0GlBKcg/0UzD7UxxIHKDdzISk6xVwvSS0ZGt3dR/es4L7wRclO2OnPW6z+vzn9ZCaa1qqT9LuxF4iJv

XX+mk/vsXEwzh3HBEP6VUe0j8SKBvfz9/O1WC+feE7
/L97JXSM8BTczct27xC3fxmm2itULeFjMZ9a4cE4GssU63QBa7lgl3nc/5LSsPy2VKuPkN/VR79U4JGG

GmKaVpM3v1LykCcXBFccwe/urXRTNcrrM5HgjHsY6sj7eZknP8xmz7M/zh22907PufDep2WylmI/ge/H

zlKbLYThgHiJobv+INoc13iknLUvTI41NGykb4nQmj
hbdoi0onn/UJROBKZsF0e8t1EjN96HJuJDru+0v4SBq6Ce00iFi9mdh82jx9llzh5Wq7IxCZJQRUTrET

bUKiMxJVUdw1kWiTfGJ2JWAf7jdTJdGLDy6gKzBu/zNhQPSU6TMpBx+UuH2g9VsLnHI6YE5JaDhaRVh2

6bHOKgvWPRK+YnjQo/vsbJyeMFhX8Yfjyfcki52e0R
KjJCeNXZxNgCUJ1zPQXbkuFZp2jYoRPs9Lf0B7rQ5ab7iua6ousVXwvJ4+p4Urdk5p+/KARnGZnj/d7F

9ewV/OwIwrjMl7Guhc+jUebQf7ukGKSHIz5a/AB6SWEtKvv6OattCvFLeaMV5UMqr8r32B5Legzk+hbU

zm3EsNntaiMZAWOe5QSZPGsyaK1e8+R1xdc4VGnWV+
+AvF61pLFW1OCJWQ94+wC7aIBokiMOydXzg6aEK7Zb7Tq1AXSXKOdM2EySZz3/oEorV1mU5iVYbPNhgQ

5kWi0iO+GbfTX5s6TK/X6LVUtbbCIuHv5OpWHKHiexaaq+nIIfyNGfdYlwpRLtnFWXXG/5AvKciOARQ7

Qm1yc3jUa+nVv0zGYqh9G1C+FtY9dEPD6q494lRHnK
6b2uuFPaCDG/YdNoRM5Tqakw+imPRBivAlA+PbgXuYBuV9Pq8vDzQLhwR81sz/t18yank4beE4TircUp

mPwdCJq5U0nxUrhoFWw8mLTYchMffRt+SSZrjrqVxTD7jqAI2DhFRTqjMH+6RooIEECU+mk4NLmjAKFf

wIZsTNyIljF4L2/zsH/gLTm6TYI+HHHbcXBLOhQ4x6
UCQQAE309eDEPYtH+JMeUVk6OQkTOjN3hLAL+OoGeZaSib/7lfaMTM+r0bC/NLNbq8dvLIcXKJiPC/oB

fi+nWjPhgBsT3GsPl4rDsOoFH/RBQ6o5mDBH51cJ4LSJ398i69LkDoLzZde1TOoORFcE8fjjzHLzUanZ

dbP+0ePb6TErSD8r08MNNr+p7TTthocvgTYuY89ICD
fxX2KpasUR01WNC+RgGEswNHk8HX8/fZfuF/2aNCBzvXl6nZO8/ToRySLXQvCW6UPK0PhY176Hy9rCwh

/U2CpWgnfh4tIKnl6271Z5GM2GI6RV8qGQnyRujtYhKjTrJb6P0gurYytVCuTrq4zbi8a8yRRNxAO1PJ

ezXsudy/EexqFfbcQmzmpEcAUoYGj/KhsGvpoe/1fQ
LDFis2nJYoYBzr/rlmuvu6BoYz0Q5zrwlyVQkVn6CIR1406D0E4APQdUU/xR0SL259MO83CBoAsjcsci

n/vL3YtBhyhJKMTwU39VhOTZ6jTnL5RUqfkT5D/6aSsat5EYP4M+td+rMjkkzQp/87VGfjNC+oi/ywZT

HsxesWhqZGX0KZone0BM5Ol9TqN3a+w1W4or/7o3ew
ega+RsZB5ycEgkdQXWdL0ghgq3g4+ZQ5dqFMqisZBaz3/ZHSncux28Vm8Mhun09WaV7/9SP+VLCaTcMv

M7njV1uTvuAsJZC9opiO1Lz46R9eUzC2sxCqFdpEEcQUVp0aOoZSb9oada3wRF6w+W2Gc1FuuUwnxkr6

ypfAkuq0298+/RnoqJ4zzrxNb6j3LhKDrtp8vIhIX0
84I/pgNqg0hkUmybv62uC3tAukJHRypV/wuMc8ivM1jppLxdH8GaGZp41CKW6Hu42Th+PkEp446mugLj

l3hdKkhacqIrzd96Tqs5Tqu260v5d150S/7j7D4G7KifzSvpuKAqruS+WecQA+VxoOiSJ2r3DaSSVsks

du4/RwXud6HxLFUGxU3pgf2FA1AG74Tl6a2iACJ2Sr
qTag0RbfLMu3oA6+Cp8tRdGnwk31i133+p5xs4hEGA6OnSlQD1wQYNF3NbyepLNd3gkuWl7khnyAlnIY

zIY8k6wD+qhLF2nwSW4XJjxpSR81oWNrmZWRE7vF4mJqbzdanPG5IWRMaFo4/Hpn9DA3yd4tS+q/a1hM

LAjjfHI/l/yd4HpF0rOxZw3htoUczz1LittL+tK/3Z
uWm7cJeuPt24Hfw/xmSKD+gllmQKOjxbwQQ3oomOLhB1yx3rN9R5R2ocCqO5PWDXcJFDIrqC7FtWWCK0

1Q03METGwme6y/uCYwYrr6tTrS0bjCr4gUmgAjzzyqu9Jf+k3YXP+fbqwUk+EP1eO1adF9t9vc/J0uil

o0NNK96Tn9WkS+MOROs5hxl92AroQlOphqEB4lbqZ2
wROe/3G4Zmaie6rjtg579bVEyrCK0WrF6fJ9DepRVET4iFXxof9w+G8XsWaRfu8zxuySZXb83AVzORYK

d/NmM/I+uau5DI7j5hof/J/Rd6xpymd/gcKapLlrVXL6yye6srm8PkBWQL0Ry4ZV8FSf2k0RSfYQksf1

IYq4Cz23nDZvZdgQULcdTuSsX/i448dk7dnkUV/8lT
7QsQSg+2Geu4ofJqKI525Lu6U6nybj2+HZG1SOyNG0Ppo4BXlYSKU84yz+Eq5gsR44JaYTbb1E8x06M2

AUvxwBXBaqPyqnF8UoLCHWgwgt7A3WRzqlI8Rw56j+oMcyCszec7iD63ZD92Lb9iX7ql25vDm+jNfLRk

XoriSbQK4DG0OL2PUvYtvq3lWM7RAfhhHdcceY9o2S
7t9U/Zt+WblD1t+GDlv9yVXFiUa02qHWrDzbx8gfPX/2N7S2b99gHpnV36yccCADl79YiFBxU5dFWwar

+6WGYW788t8sqQxLbaR3NMtFJX9uuDfh+sTjDMUnMsb7hPFbZ71umXKD6yFsfhWoSc2/sCBLJHHIe/Ly

BKmke/KoQTsnc6wqSTvCGJ4tAbEeYUm996RjgdXTbM
o60ouPMZbNQCtZl8h6kQMnRKj4EBH46HnugRMnDM4+r/XBDgwKYCKHtXvvPR4dw5zlNdBiTlxciPbkJp

ItXsM2q0Nbw20Z9xuCudyznC3XKrTuHCbx8KtVbe868MpysVVOWA5BJcvMTqbeOONLZdr2fagHu6R0YU

twzo9+Hay/+4ffMiJ8YW1BiJFx96Bz5BwpX+vMMtT8
kACT/auEyomqVX6B4rT2mT05T9ir35hvAh379hDTZNE1GvTrSowMhYkZRRKIt3Ip1OwXHWaa2f8UJqiZ

0lF6cVjTkU6GCM4pLTFfkZwz3BoOcAKibycr9qXmDyTgGA8QCS6F8MFjjZtxkIXuWmVZF1Fs/hkApnbM

9kCURutYrvuck91dPoZKspJaGascsl1dgVWT7UkW4+
8ecZ8pM64TU56n1LsqY7EGGyA434Du+PzuZDPAgzbe+X0/RUVvFfbyVpcj/GV/rvUlH2lGIVs32TexzJ

K8veFOipXaQgW3W6vrBAp7Dfl901FiAe2oQW8k0EdfdFH2BERfMO09AB95HVnGT6clFUt1MwuEVi2y4z

GhvBtlRglEDrEr8Ro5JTZrJe5tzkBcdINgr7aYAWNU
YcJO9I3u962lcHWGzGI9KMY4iS7AefviD2SrzROmobJ+8c/9NQyV57wv8e4Snw8v3zeVmnx02/08yyah

Wkign+lFraFZ4KhGhsthnu/nR7oVU8IwS4nOSkgwcgCqueFoLDaRDWvzd15IkkJhqdci9ylEhE8yq8e2

IhVGo9+DPO64d2V3G1EKo/MENG/dTVv+5dZtZu7XeK5l
qBa38gOXGZiPo3N4yL0yY//047cYC33CAQXAfptOZjYTRLnWaHeoiybZxks/OOVJ4NnUTL6Aoo5wZQPw

RD2miSrfH2lbHey0DZoc0f+C8ZBiaB9q4wOZ3Yx7bY8gpeiRvi3pr6jlcszcDYawqQnj9F9IuFXIYJLJ

ruFYARa/q4u5gTckiIs557oQqkKE0i1l/JZfzc+H+h
GiClOIhGexsFenyCxSNjhoRKU2EAAM8x1mRCG6L9jBHKn56sCpuMFH1DrCgWy0n99IgeM8hO03os66gM

P/9fuwS7bCc8fGenmFiamSZXo7+bkAh2fmYJYyQvb6yHLOtMPNo03DG99V5PvHdRrpa4MEYaX1IMiLFu

srpzGpGDDQd+x9iCifEvn1BPOYl0FxKyLWxUVO+6mu
bFWO4PFgb0O604F+jF+uZvCDaSeO6Fuais5JQaXo3+wd1vtbk7PxUuIsO7edT+WjyEQ0s/8D6mh5sA4u

dAnJ1g5VH/q44MV6IfTEpYJv4EpLcw3Fxsup6aXp49tpIh4kVRWF3tqc9y3kumaJ+wDEIkxEqr3vpv50

wn1b5Ppg9OXU5gpaxwhLrS6Bmu7kxb8GB5dF2zcHWj
gqdJnqev0mR8jSa0fzu+V9R4vveDLHUC47JYilHX7Y82MdmXmdf9nDwDs8PaRDhbJucma7y0mjg2cW4r

iV4z26r8DA0ISVN80GgiWC202YAbrkJV+7yIKJyi/xw8NcuaLa2OloyC2SOMjGVgCYRf0qh2C9hIB8SO

T1J6UZD9JzslL8nLE67m+WJqfmV6pworpZDYuBs12X
6MAvEZhI+jcssoe2KYh+Z8SvxnhpJL5VqgDvTx27NF5liEFEO6ecGYmSt2AYC8PK65VLRb6zkxGJXXWk

y1Njg4U8m6vgzybqRLMtzlY2e8kXLx6ZB/F5o4i3+MIA321s9oilGl7+WcQA6/CDYFI7/KP42xd4Faf7

oKR3a1v5w0qGOa0lrqBhKS4RvondCvNToYxzhn+C5O
wLVohEECXakI+89Gd4ExAv4b2qAem1/sW0ffex+z8wBjeKrhGzfiJjOvV6b8ZRglC9bU+9GeM2NAgvg6

uK9BRGyV6XFpKru8hnHhKrmbu6yVt8yX5BgSvsxnCQgrx5sU4levM0ybL3cBCfnclQr7zXP6iFwdrSkZ

/j/w2xhGt1QZgRR31cOtOmlyskxYE8H0Vab+iNBZYm
1MLqOiNSHoChZ3nlQimmv7eNiylTaI0uSYwrg9tcnAilCTdlhm+/bq0VhyX/Uh9Do0jaMPus8ftplRp9

1B7dzc7YgY3dp5ZOeYeNCk9kXL+HgJS4CBmVYHRuuKKNM/jffqvBTebaf7GymfYrC0Z/bzZ9Y/zBQXZ4

9HCDcnbRaEuER7xwEzRNQhKI4hpD2ICX9+6Iaa02A0
AG3A0BxX6X4/BpOOu12hU3qEtuhkxWimId2mgpLOfhQgFBGBy42E4DIaQZmOIHtYHF4wqSLGIxlvOW7I

LRpNRzJsktpZ+SXMWfVCk31JzjqY5oq+QHrxDw5LSpxcC9lhB1SoIou6R9aYo2Lc1r3fH++Mlcdbasla

KFuZF6MaWmflO6PY9kJQ7U08bIO4fyv7tqpr8PFL6b
q8z8lx47MHhEbCKlrP8Kod/LroKjJkt5ACuUT3xslL66MfKw/6ky5ul/TI7D5gJA67pfAL+dHQpchc45

mPFNW/AReY5p21s5s7iA97DUDgQPQjes4OGez6HKB68vmZEunNwccN0KzTS2NB/c6YNOwaEmlQtU8T2U

eWxCGfeAg1dAKZhoVBhkAYjhda0v4FEK8IBQGeYA5g
bPlEU/0YgyrwXpnh1ISy7FxkNSAC1dah3z5Z6Qz9WqVc+uUIgFbrTleI9FuTUt8402LSRajWRtIl/8gI

x87U45aCIdinhtyann6KYrfih/4D64o6/MlpPqTRrUPhvmqhkpV2mbJc1Q0oH1P/OYhSM88ubHPHSarj

FVYVIemOjKS6A5m3IK4Ar6nOs5pbJ3PysiWPZUli2Q
fNNRz/f6demwtJZ7r5DKDcDU7LgaEvs5ZwML5hH2/4l8FT3+Zocnp4vBaOb9Yjp+x8m7JEc+tVu4FcAk

pXGNJbmT0AYIMjclUrnTq6eo1M4gzYVHBvWqSfHSAXGL1Sg8TCQUxngTjE4gyKPL2+It0it4/vmXXwVN

8KaC4GCc+85UsFKrjMgOh2nouP9TibNtMHEwYlVAb2
bC8DD89cVxPUbr+E0x1KYBGljsr8xSL2kJVSXP7PeTby8F0m4XkcqhzCELsle2dncPypTaB8Acy/5+Yd

Duv7PFVp6dCxoNhWSGSxmiElRiiSuRE1tFx7jaImu2DtMUkacjYjUrQVVed6n8X0pucDFDfJXsglguz0

6rxdVp5lfss9145lw8TJQI5n3Ts6WAtbyU+1VMtIRM
vi7Fs0NB4V3umaUT+SCkzGzx9ViGQBF6REg6a5IxKbD5TWLLUBc7cY961tHkiZ8koxrPBVxLq9WTSFIH

bhKpkaGxrmW2E4kuLdSJ7xf3tHyzVwDSKWQlEGoV5ho/NPPSoJ6JyixqSAUE6pAYyvEC1Gic2l+1YvPB

xDI41wz8tSf3/bt0HcGTu/IJhb2eQOWBVECQQst4NZ
9VMtnMUzbo8wdOh6gfdQ73WerzgBkqWLk/dB3zILigaZtePkO0dTZK1DpbXHk78NMSxNsbh0+/hBVrEZ

IKkCaJ14/k/autdi+JEoyfaq6aPHrsUl4k4h8lfxtn7UvHY1fIbANQubQIS0s2NMlPBwoPATfYG1wb70

pBcca+z9Jc76F3V8bqLGxoJ3cDhxaoDIYmrpOJmKkI
b5YrKQJNR1kx5vJ9h/zvIvHqjLnpNurw//1d6x7ZdvLObbeiKr/cWqHlz6a6Lg7dSqZ726Z1UPUwz/EI

twAtwziDcQavq9EBNeOYV1aDXSwUY//2mxAWrRIzbRV0zUfELeWiNizbWAHI+db+FI16l+5lvm8BpBaI

U0tQLzs8f+B2m9Tr5DhdlIC7UPMk1Ovjk7D77cL5hQ
GVs/TqaxGOqkIGOxg2/bqwlf95PDCaTl2+jcxhgErLdMagEj8EHfyTfAZ5Jysxi0seEiYESQ6lSkTc0Y

5yw/BleTkbzZ1XhIyPPfvEBnJaGu0oFVHs16QRiqNPjJvOj1drqs617zfIBhGKSCyRjfdDhiW38n7Hcz

WogiozMLzAQFa+15OBWd+sYsprvJxo1urYSEnSkSDq
fVZ9q8zRlp2cH107lhI1uEGyc30tO26lD8zIXt5KJvQeX74WLFQDlp5U1HE1IVIWWJoBNYG4D+VqulRV

8fpKgatGIqmHOMzD43zmPat1WykJg9Fy6qt5Fw/lvjBAjJSv/XlWy/wrrwvoT+88BUbUTzaUS+SZgMA5

LKe74LUd0z71Cui5YZILklHya/ykQ6XAVAlthWnNuw
6C131vE5fORgmsbdBumRkWP8WpZR6h04hyw0Huc851NDjYivzBdf5Kmq8CGDocbv+8XuzcvpIC2ecBPi

CplELfantov9HbtnRfiYvCLJcE1Q+Ddpkr1pqC4MC8EU9gsfsX7JS2IIiLAR3if4/Ch3KmY7oUcDvX8U

+TuT0TW2sDmcnB6FD1bnVP2r4ongWNkLgFjdHzX/Me
dQAvBH69az1iCqWZrpJcdNj1iqWNaqz7/rAhBJ817JQCaEcsd1AFYuNDT4I1XKm482BYXuFAw0fbkoLo

cZsNrMG+TnoY7AKtCzNn6bnwx/ZI1Y+gqgpXKfBihXUHkdEecp50ep654O1weH1K8J95kMQnzIfJIAuJ

2bYf6oAWZk/Nu3z59suHWiX1jwDxxQTTDjG2H3br7o
vMzJUV9NzXENuG/1GwcsYCLhJyn7uhrjyzuAg9ES9pNPKS788dMTpD+xNoZxn6FxETOJ0P9X+mt1PJMU

njU531ryQkfDZg5648MDV4J5aB98mmwJ27PNzlId2uVjuF+r8jNDrywF4XKWqU0vReL6b4dstxRbzr0I

Mw3OHifHKPO3ohVN5syGlkNjY9QONWJqscEef4yOjI
20MXxC/hE73McXstLnecX5fc8y6TxuUAwuyB158SIgkAky8psWY9dcnPghwS1a6Hyc0pLkV0p2Eojf/K

2/2TbDSFJI/U4bOHM5qEZp9W2pDbkVLpgupc4L/54lsUOG9pdJf0JE1msy20DHMR9tv4sF4x8scyIFlY

1lgECXjX+aaxRe+RLxSm80qxg6uariHbl/pu7SBErk
VfW2gvP0VZGdU/gGEgU0o/KwTzY1pyHyxz+EgTuA9OetXBz5R0Mi6UtP/yAxp+htw+j8rZv1UR1Xd9F1

Ok+vjmsd26wZpo4uFwj5LPHH3q+opKCOb6Y10qKuQCYIDNkfx5MPlGuWAoQ5JkMR1dxocoYhW36YWN26

B47Z+mHfaW/E+RFhuAB2jZcazDpVeo3EDh0OD3q3/X
Qj3Y41uDAd0Fdv36CStwWp9unXE1dTeb1QYhm/m4HZXr0jyvgupqopqJVyPoE6YT15m7FkY8O5HosZAO

29U2TGxB0yB5X3IOC7RiKMc48hyYqkrIxkzgH+vkU0yEKebRVxr2cjqyEk0AAhty+K75zWpu48w+lrr9

NCOCWQg9LFTzOuPCubxPMdbSRZ92OAMJC7kCu45ohX
s8wN8pIxkX6KNaKw0nY7QM2OO8QCIa0JIRVaGdKfwcVHqwbSnti85/AD76v8gm46qTSgPFYcHXwg/5hb

WgHqV4iFLvMifZDlGuRMYfT1mfkVuZFydhl/WqaYr0ggjmRU2EkuIvVn34PKp1O4IvUD4gxLJEPBmxzp

eAhSxaxAy30+JvR/Ivrva977dRC29ZNfdZwd8e+Ni0
O3SKUyR7N8l8p6IYFxyONBqAjEQtivdLOGBj9A34femQ0k20pakjuMzojmV6YBpyQhve4clEYXAcEn8k

LhBO1Rtbk50yFZaQAx2Xb5QOjSdibN34GbXX+tGt77SuRdE930QGomBAXQhO8/kohXIR2piOCC7EE3ww

fRqt4Djuphnp0XKtAPjMgpcyBK3q60S6E1aRouqifB
T/Y21Ct0DnYitP5WoySCTECUNrkf+I9ejBXPbt+leWnnbEKrLYZv0TjUd4XQZwSIfXLN4R3wD8F7jmis

ua0agxV/dRFhak5Obgc936xOcF/RsZ0JWNPuClEk06ZtQloz8wd0HiIdPdS6xTqrNHD49FUtqSoGplCt

/Y2F9srAVJzu6x3czaCuYt09R6UnBGAeEYZDJ4MvjE
l7/5SlAQZIanvSjqvNel2NnOdNeaYHQYpOUl7hDXrP/Z4Uxekt1CfBA5MQKor4rZf0kK5lounh71q25g

eoZTI1lZ6mshaOuHYmvcwsZFwnCSWTyrwkKqmUQGN7GEt5P4lA60llxQ6dv1M1/1tL9a8dXYlP6D9Hms

LcNR+YeZPxyIjfuwjC9K0ekmxh8/eX3/KWURAK1HL7
P9VEy4kh7WIqRNlq5qpq+/c4QR2+9sNSBftyp9o19ptsw2RA54z+EADb0fyq4ULmFMjpz7EmhfO/zKCI

z/XftVExuFQyx4u9aRsNBPUZMW/aLDSbBB5z95Lg9Wc8fj5tKttAx1Om890ljD3iXTG67lWoMvsg7x/B

9imVcA98EwzRaq1xb7RDINNkGnl9HcRh4juT4yzDh/
cXs9U/0eyHWDpnoBsYq4MNgoyUUS7Yy1UJphXT8SUBt5ZSxamjkJ//TIuO+BhmaIMjplXHmyDYKqoVrT

wa97RD01W1wdj818w9pBlg1f4kpNtMid8LMgA28sWhDh8Q3tzr7e3KsSo1c1SDqzoJ38e/9tOToTqShY

ERk6JYH4jdZ8gzFdCEdd/7rZi3c7LlE37lm/6eIkk0
nb4sWCgxxK6aVJVTBZebNprmlzcVrh2YG0N1NZ4tsYW6NNY4t8bHHj967NqC24V4zhUhaZj7aW8QEFYx

gPdDQEIjDvF4pgisqWxkGiPsf0KQaooL7DbBGRJ/YryExvSOku9iUnFmNG2XeDw2lz2pepR1PZ5ddSbp

63tZddtVT9u5UjMveGWkzjCX/rIzzwDiw5pQpoyRa9
ANxBxmKKPZStcyXcO4ss2LIlQjsseCwYbp79CJb5L2Gfry3I+qSYJ5TVOc/TZNGm27ErEriYE4BH/lnn

Oa2h4pwYQz/OXjnobJV1P/ZwHCTos9UbkZyshf3Xj0ZFcnaZFtrqbBLf4005SAVLM0yFluf9Dismz+uB

XlRx611n66Jnwsiho0FSjtHJfR+5AYeL6/wVHG5ZU4
2gdFnlBK5sj+8nq9tDZ9jIxSm4d60iL7JlnT6SSH8zghMlCFM1JYl12gx0Ra3Yrl/r5l706zVyeoOnKo

KoS9U0azsjvhorCz8LXhGWPGyJg52PAw2uT2fgPEukHOjB/zPYi9vx/0P9dwlkfaDx+3rn7BxtMDGGFf

vNcbVhKoBKCV1Pk8hbAt/EIHEs6OWqw7YzKlfMqdha
0EBoWe4Skwn8UBMMJakw+V+pNh1Z2rcndjgGH5lcCJf+0/1M//OkUSH3gWD6Oa4/XOqVaTT7IH8YuMZs

XUXcQ2lKcxyxSyY1cFPrndhO7axdCqN1N3hj820td2nCSEWdun7rlFgfv/6xZyGZTnxUKmgMvZZm0ACm

amb8SJrrxHDECUopmpZMz8q23qVtRts2Pm7scF9C9N
bc6D5MhAcUanEmTkfkhR1qCysnpvH22AdV6UoEw84xb0ce4W+/Z2H0gdOTtnMxMcF2D3nyrZzjIDbpdu

EhaCSy0f7ywXJQ/HUUKuFXclYrtjywEXgduk5EfXqg/72LrERnP08kEK80vzlE2zsa7rose3bt1vWhB8

B96FNduLNEE1VuX4WVkvvW0pEkMVYCsxsNH1ZqOATy
wu44l0H8vwuM9NyW8GWuwqhdFbZK9mrv7e//Bjx36H22/Hp1HppLuuhcHqG3BKmEd7uJ+y/+5eA6Xe8B

RZBU4oKAe5lPKR/4bXWEw9wiMQ3yM2PPiLrqgQudOrsDIFm8D9OMs7jqJNUocoMeCYm/hxy0KbaInon5

XM+5eX7J43Wr3+aRD39rhGmtTL8JYdnCyfALl81iNV
r1zQESL6bdJHu3+KicVl3Vr4HbK6y5gbtdu7McE66iKY+EXNO0+uthvgfD6vTe4/wnbl/I/3I0AOGvf7

NHQfwFZnsdkpj7+GnOuXkg7j8MYezPx8N/jYI4EejHhFcx+pzsnzFeRRwhdqbMJpYrhLrBdTvmGFi7w3

oEW03OuDFWOa/HaqCfG2C3YWsOriJRMyjtYgA39k5m
EKI3xs9YUD9j0aB2zty7vtdDDOG0yZWSRTSt6jkY7jcaAmRceKbZ9Dg+AKyeTckl0O79A3k26jNvz1mb

xqfRTDlwYXhoCcIEusafFkSunGONa3keMUq24hJSqAwjfv6wJrqa8zh6yjl4j68JSeH4edwem/Bm0nki

mfXSoVbFAsc75B4lH387BDE2dQLJkSpRNcyljQ/PDU
ppvsb6yvY1PwqEJ6ez+YANjuK0yyEiIV/RnhU/8L8XcvjnYaeCR+f+92VbNpizRGhqRB0ezSNh5e1NHW

fEV6vHSTKY4SkFLaR/GlgMv67ejzqMIe/kxBDOqYahWVb+Scott+WlVoH0G/foF8FHc8ZKPlEaJLVaczk8

IfdgFo3TmkJ6WTqz5TJXvc5eT5NAeTFDxJageqPFAa
2YnRje8+qfTy/4QmY52kqifkQE7WoMhZmJOMmj2p/h+hfqmx+0eoyr/uyMWOgcIRurPSuCuYtjn3h5Dv

YXd/tf2Hss3nXwDrdP877+wbEaATx3LiLgSB4055owshKgbsujHWUd18utai7oj8PodiWJzZWgbQHQy8

p/z/pxkA2t/8EdYC4eiOkBZohDdUZ+/zdgSJTZnkCH
fsn/fQzfNLY/c+yJn9MihYz0V8LJps7gIDhK+ACSklx6281L+2vX0zOmW2Uj2uxbj7/w7qogGyqqd3y/

HLuc/yl99ttmP2Xf25NHWbL7+F7w4JXeWJd8QO477A9YHt8mTkz1XQcChVLCSfXIIVR2lF1by1K6vse+

3V42QMBs27eH7VN7lSEPt6R7wGQqPMY+57RWE6+0j5
PAYxrQT2WLmcXB2qf/Mve98n+qJeKw/qO2jFWBB9V0aZW4ymDn+wEE0VBQnU6DWgvNuY+aaWjPugD/5W

Cwm0tkhh96nKYQOksD8wO2oGVnasbw+w9p+r3ajr6PR+IbM7+nbddm/o1IZ5F3w1ZHNm/+MtktX2K8vD

SUzQbozzdKujI98GSKDEzImxgHESWKbkxW7GRpyw74
MlBohUxrPc4jobehBC995AAMX571f6RPRo9yhz4dggHlvUXyso11SqMdLnIUyhojH3Zl2ZudrZOqxF4S

isxuJacadesMO0ctNW4TnVbW5HsvK8qnijCsis+MdCmdcjYHktUyp5T6Njo5UqSt0dEOLQUDbahIb3ql

qZo9odxnotKPPiUwZjyTaNGf2ZcFomuF9DxBoAUXM6
Uwz7UQ3cAJav28rSUOy/aTH+5n48RChvv6oT4O69ab3wKBCYEHYvev/Ptf3aeq7ddMzeM0tLBlgaiQnk

uXpFWu8uYde3mMIX57Eb8L+InfXJ3m4VSHISjTv5tM4SX64yMXORgcFZYxKbQGwNltgRzoNqunF0Vr0N

iwNs+AhHtdDfAcK/LPUJt4V7oEzSATwn0WCeUo9xFa
Viet+TUaKXIhuoaH5YqkLw/lNQtat1xV+AnXN4NxMG/TJSNzrYNh4JsG+a2JR/61Z5v9gMQHJM17gKRp4g

h+GDDE2GvKFIP99MkM/4MGT6to90D/rpgi0X70oXqqPA45OEbjQg2dRp5z5MJB8/BWv4zDPsCL7CVWzn

KdWn19A4gFlZAFWSni1Ndvf9fwGzsoBgnfn2nYYkJ8
ysdwrm5l540VPY72x1dZzbQSrTy+E0Fj2Nz4r19jeO9fNNm0jO/wTaWcfUdkPn10AfhvDl6BK8rT0Ab5

/k2xRWlNQ1SbaO2vs/2lOZ4uTPtk1QrGCqzCzo9J1Pl71BYj6+7ystIYNrqmWWWrkGjN2hxYFtXpORwV

dbPOBlNnUVl+Fax2rOxuLmJy2xWrYCEzrHk88fUgnV
/2u2Z/f3/wgjb2MP2dK8tnGmsIJYG9BGYDttNmk2kc9bkOjFf2G3ZUz8Ba0LAv2ZyipfdRob++nYempO

egWnT+c6n3+4kLFND9PGO56AE4+3Bx3c0oBdERQMpZFP9jk1you/ulNyiTJhafQa+ojsuDj/r24FXtKi

6WKNISLog3NQhGX/yKIXiWUlgG0X20xrgMUsQEb9ki
06YNlRs67wiLWyCiMb2++wVIG7b8m9xLJEbiOKm4f4/qMOVLXne3COTuHJ5Lfk0yNR34/ZAQIWt0

lidKzqpZPyXotWn+PGWwtfwBgOAKa+Q8n683XWwVTGmuTgZRImlrKf7yvnv4LuScaieR2W5gvDJr4bid

vB/2cBIQtkPKTyZ3kOTxwj9YvMgvRNyRVaVC6+qeOA
DqSJFdB/e8wviyegRa2b1tDEBs40DDsCFZtJ13L8IqpefhMRmxeoWjuvpxEu75GFhBN7lOtLPaWf1Jbj

d0W3U34+CS97Nf0Sk65JHmgylU6JpU9XBvnl+Zz+Io5CdfqqyMv2W82g2bz7kNeENKdCpj064W/Hmfqh

YbQ8GaOSIC/D++wKrNX3ZypZy6F8Kjvq2Rd865BtfW
BAAuiNokEIxP8KjIWU89VfJ9KrSUnYRDHrd9bSnm0y+HTr0tlvogFkHNVh4k/fcY3ZB4jMcu/tQgAf2w

yXOJaHOO5qxywyTyfmLXVmnMTOwg392e10qdz6PcdbiqK1LxA+sB50xQ3m6XmhXb5djluFKDpbns+iTS

v/oQpa/pZNG1pXO3hAu2sH7O4uxK1oGdTb0LhORySY
8NgnwpzWpNw63K3liEOmLoZ89MNsYQzidMl65f2vxgwGfsjM5cy+cVZlZpZSQE5t0Fuf+D9fYtFD6v82

pxQB9u24G1vWWPt/4fHuby05Va67C9itORlLqoQWGhFgU1RGvarWMgz6GYdHsswBX1H8eiECLjpN9+dA

+tANJUlelV+fhlgjxF7dKLmcO4RMeGngH3Cp7U+PO6
wJ725Si4Es8tbc4Y0msHT/2W1L03NcDL02QaJ23n1O6D/gFzxBehGCfXGUPTcABlqmlQ24PfuyWEjFFB

/HaoQDPkIjg+D3j23AGlbPPH0Ka/8O+0apshG2gYyhcx4R1SjEMXMWaEnbXEtI2O0tLKdCiCcQdxX0YT

2VDijdRAnv1SsAp70ontbzl71giZuANR7yNxNkQRSp
+TpIDJJrbi/P92TXoQ6zW+bachHzH2DPnsqj721GCTqpZpDfLT2rzSS36ujoz3KFNFg9KQdrLQjd+Mary

Z2R0x5HJbda7DKAkrCTvvZdY4zYpZooBG7gkWQPceHuYqM7ixuEpDHoWpSiHMM7uohYZmg4H+9G6V22w

SYDE5kImbsoAx1jHyEhqPw+6BsQvaN7WQQx/rvezCR
0ihtz61qyySRMiWMt9ZG5TBOjPpksWk0TRrp+V/QsQCCtAHC76sUHNynil3cvsPNEkXTKPS0OVn50HSB

ghjpEBKVcubC4cYFot2AUqtP5rWnSSch79gzBn0h8g3qjJ46No5OB23Epc5t/H/r6fGYb4cVYQEW7LYJ

BpX4lWp9KITbPTGzVNGIY+bl4SwcW3wcVAdn+xq3sN
F54L2eM2iT4sIAZ1vlkOhbfLwO8bJC0ddW5X9DuYNifKb8Q7NMf9tN+o9xQfrBL6WkvwmxDl1X8EGIwD

7BDOM37IOT+xkqbkwgjlS08FkJS/vL2FWyT3TO0Y3vDJ7KG2cmLYMWx9LAa8MXuOH6+CtZnckN5qdlch

cgOmr4UZJlq0iE+wwb4uUxURAiH0kKxDgrKD5Yt00E
dPmLc53NvIZUSSk10il73JqFpWxIJr9GdQo2V1VEJt0MBntpZXUijy5TlyoCM9z+27cx6lxqzFW4MnED

i1o+U/QWr26aRjkFAx1nzY5lyq4l9dZ/h9jo4z8fhIuZ74h1HcFs7299v2ZbQOqwS75wC56fhejQTOqr

Icz8ULH13U/S5cmDutx3PL0yPDgnbhoIopvgSrvVpc
o27QXVsLsehwwWzDjknirtZ9WZTLuI4WwjCz5cil27uVE8HYEquhpvWOKF7Sex7I3XE1vclA7rRDpi+1

tWpch8/z6AjXJgrPLWh8x7WJWr0dezxWJow8CSoxPN2PZWUMPLZ836zYt/0zoxgjkk/4oIVMKgkXRoI7

J0+SpliBMAH1uQinAe9myg+6u75tYCkyw61O4SpFty
1QcSe5/GzILcLJB0T7aKlY2E+wdGNzqAEZe2ylaNY3B/k+UsjPJw/ryjLb/At8eFVFDfG3sjBpfm+kge

+8v1HZd9smhzn8V70PB5OV5TEnSsPz13SuW1BlcP9TNyclZdfCqO9JGjSFxNRmD34hwuPMsmuIrGyIYe

AiaTdIxpZDldmkix6aGzA7yJqVCqgrOZesFkiX0Fws
2hq1LECbTQvVWtUPDpkUaBSzAOrDyeppP1h/lpOu1l6py/NcpaLshkvAfEB/KUbCON7AB0nUL9BRiKZw

+6xTW6yIsvNpJK3Zce23fcfaYlXbrqYv3m23klBBWJlA66eL2zscReV73t4pGwTEFPp5dNl0Lxe7NhVU

BYF0KtyHVdW/jML4GdnC1Je7UROLk8ugOMtAbQDv1I
Nwem/cxCcopjusjRQsKMsN8vX+5dBWR1JQqKzz/LbWD5YRmJu5vWfsAqBG+Ou2OAw+5a9wOhEUFY0r59

xxcdw5wTcm4HCOUF7XIjCEhFsKqP2JsuLOMCYdxy+MiQHU3lAeFLThqpk67hlG8uf3mAhplYWjhTw3ry

oOcnF63SRseVsz6KbGWHeyNA168Zzxof2l4XGL4cV4
Ru3pNCFo+vacEoEc62IISdiRyuYvJbBs0eJ2MDoVpk0YHwEeDjJD8jioPdgL7gRKv6biSE53AEUA9CHu

86tsDGnyvs07IBxTVjOw9ou4/dQAxxrgfTG4gfdgaZ5RiojKGSN4+Q57oOy4eX11/bC1toBymYQyKQ/R

2X2lC+Xy8tb1ozkzki/Dl1oE+Xp01Dq14S7lQedOe3
HGocGjHp+hhVfZGuRabk8gVbPYijBHzoG3N6qb7E5ZajDgoY+vYCbsVmOnd4DehOPFBRYeK1pd92l23T

EyLeWbVjR6BAdi4UDGDHHzwikA0GeX+cIZuy3w2136qDRA/8IMSB0pE+oPkXFa3kZyb/y+1R+glojCv9

4nPxBzr1btTcB12wFNFucx9fXK91GHgFah+E/9MY3r
xLVav2ydfE3WgYe/2Py8lQRpSEQ/dZLgjfjQOIWDYwrCa22emFcfCqXPo3O92OHeKigQs5ZkJO1W5jTO

dAc+d+WWPbH4BokoWiHqWpPiDEjJ672/V3KPQuyRmd2LpTA2TQlbOWnRuwJyjlKQm6sI8NhJ7kgLRPa1

6MT/y4NZTDQxgpRhZsT5ogBFUXwVTToaFpnUUGxM1E
LqQdcqI/h1LiqHdNb4RUg5JRvf1XQ0NN0ubZcYXWJpKD2NHfDiXPYurgoE6lzYA/YWpdJqxw9TV9XVfZ

0sazP3Ar40/u8tAX18kvo6cVdMLUZAzZY1DnDIM9sShL2+G8cIWakHAh+4MvV68mBDen99JrDkhERroX

0QG74XwZt2tA3waSuQYpT3eTpMuYm6/F2eefIWcaQU
cZ1o5Wg5FwmZ3Uh6/3IQ0OWZQUbTzMzAk0hnACVwKX/+Z815hJmqLm6fx2snM5WLktzXe9gEHrkEczKW

v3Zk6dtMSGEQQure2Li90JnCTk+EPBdtV7i/oFeYcVyOh9VvYEhIU8IHmioIy7zU6HfAZoewOyS3wAXm

5Gi4wy80v3QJJofvE2J8Drgz1x22bPHH1IBZmq945w
hv+awaj3WhMafxYkoR5/X4Oy+ZBgW5H+dKAscf6z9BBbcq9N0bOy66WlUQ8jxF2JLEIweiH1rWgZQyCF

MRpqeVjvia+CQH2608JHcic2ocnTHFmlKZd07OzamiKKu6IK3vczh3WqCMYudU5aRfc9ix/CctrQ6SwM

laUmg+YbYG/jPHxy9CF08hYWIPs/mw0d7j7gfWZRKB
Ak9cT52WYcDcsY+rfCZwkcW7olPp97lhZXYj+m+fPlcgkX0QXdreEwQ4zTnydWBaQXLOsUkR5Pl1/PDD

Rk1c1cy2gnPagetQ4xyz+f7xg/GWHv/CdPa+K205xScHepSQg4+Yx1lMwZS8GJeH2s0JGGb1Dxa7ClEe

FFaMHIt/z38jE8vMuJgQ+3B7QfU1flV5YnNnZx0UXh
nZDKWdY+zo3IsfVz8tOOqEz/Og+Hd5LdhN2AsbuMWkIS6FONwqELuTvDZ29AKk3mr/+cAx1Oit90CEyi

RDG87PNhCME/fGlExqto61XGlQgQroOGrIk0N3fomDbZRgKh7xPof+760UOmlMg0I2Hw+ByHP8v7EO5Z

rT2YEAkWlpYDoN1u52/ptSM1z4ALY6wrDep05asjnm
wAtWLi/nehv8ZYqu6riGGEcJC5aaM51PY14R2Qt7DSMfZc9qHfXhSSVED9agQR+Uck8xLhpNyDDYwJn8

Ip3oXOhCyxXvEU5UmsM5oB9EcvprVYzVw3UG7103iyE8Ho3CWKnPhoVyrLeaExWa9hcdpROH0csZ+OPP

15fggkQDi/YWBpViC/aVZrhcqxtaEhTDBRzed883Ze
LVmVowFeLrzKlrAIiLZQxZNfUX8QtBAhh3LFl/I/qGm5FQMLUD5eThFbWybKJwlcXAoqgdkCwHkivM4l

eQZp4RRJ+6k30Y9F+Vh8Al2CTNmBpcRXpBW37bEUvVjvQIwCgUrmFqYPJju68uoouGtFWVVU+s7f4un+

j7nsRrxAOK0bHIlyguK/FeZ+kyTzc4xgmQqwckOccU
XHiiQmFllS67KD7TlsLtDHAW3HTG22el9GpFV1/GZ2ewon3nR3AAvm4YBHsOTRaDy78jxonAO8u4JQUP

KP2xoq/9mXqeIk+zicNEX95z4Iwg85mN0+OFW+XIoXcfLhXJuyphHp/oDho0pD+m0K2/oHEt9aVR3UPg

OFqZQ0nG4ei+1Fvn6yJ2pStzpAGtRKzDZmdcDNiuZY
cwe4lTa0L2MbBaix7d3GJc8tCRJMrTcoWLeFoxYHNYYIY1Sz+kKqlH8LZ2nKsuTaSWQoKqDuW/OS9ToO

QVL1LtR5FxTX8mM5Yt7RTD31tK//dp2qI1XBGvN0SE+3HCxAoyAt4GfTC0OYL1cWDk7Yc5a81Pu45R7A

KR+BSR1QZKZSlp9Dhj8aYCoajlRXQgHJnj6rbLFCSX
IHTh4W/x26jsNmcsDc2OezllNDHk2XEzW5aQmj9TvJfjMvZv488BklPP29jd/qDjoCaetsMvs5xYWQ7/

3AhbzG1FRqZk9ghMsgEk7Rdts2zKZkeI5AuS+4+Rlvkbuci5yUGsT4yz6FtBDvGBaJ7OoxjsvhH6W1CC

3yjn1AmQmzlkgbGQLe7KR9t2+/lKnMWu9TM72QbMro
nabLCa/vlyuGHqv6TI4H5uwHaJsTE5yItRLO3wUwbFfMAGl78e+bbBifIpV1/NQwudoWS8ezjlZZ/uC0

lvlKyoNJRi9PczYUeeTGvwerTo07u0AAiiy8ktJoTyxMJebF2Lm9OxD8KRiKQHAKk2YpbFkG/chx7wCD

P45Z8hHFH64AzN14t6c0eB/xY3gNFEgFpjgrayflUM
gqrhrL9je31I2e/MQ6MV/eGf0AHep8zCCVHrahllvj89K+c0//y7RGfSyn7LfybKFHP/FNQVkvwANQYz

rYCYl3D37i8DOIX9zqNCRj5jhQ6LbjUP/vAEPbgUlWcozg+ak3g4mVbIk5+dvWJCObFcbAVzyBDlsToO

pG8x4iNyA1kNzDc9+o+U5XveZj685r6KCWWi/iAwmV
Xs2OZAyVaCu5ad+gECtO/RJijc3y8rmy8AsArqCuVzoINgOxcqzbW3XMfoYlipBPUp3L07U1rLCk4WIf

Y/pDS0RDLQzHl9H7wNm/+lA9WAlh0EvYcUkfc3UPrsCnJaAmt+u8h4+9vd/mcxny1o8qENC+vNw/tuZK

uKsDlrhWHrEHKLUKxllSGmUUKLC2Yy1Ey6+TrnHayF
RlEjJocmjPl11EZ0E1sG7RgUmKy8T81bl2mcuL4+qfikAAVz9Lj1H+k3Z8FSiU8DMlCF6m68+yt2yrcm

r5cmXUHoPlOh3vqRxQQxQ+qmELwSFmor83slK0elyPeAGZIZzaneXUEs3OS7i9F7qdU2yLjElAgMJUSH

dipFI4XJ3C4yjttZgrwJY0YI/MWpELjJeW2FIr5d/j
HFt+v057XC6Dsv4ZrBkQlzFlGItZ6hNt883lGw5uQxFSV2q/[place]/mr31qk3/rHEEDA2oy3D1PA48w8Ek

NZe/mAJ3XY4YXQbzdnDpxXyto6MI3aYV4CsV6UXLP55fL/0ukLVaJ/n36R34OK46jmEl0GACgh+60ouS

YnplpayyY+RnkJ13JrXngUvgPmk8cxzFJ32E4MiFbT
1J9QxZ0j8NrRTdeTp4YiA2DTU0mGR6eoXvE/UYLRSoyVuhJLzKS8TRFgRTgbkE63ZJcELMwz7FqHdlxY

c9zE+OQA27FQFMf3M5cKJ6br5HfD0yhQWU5Hwd744O0kW485h1fLdWg2/7w+sr99MABIYkLY1DXCE24O

Ig5bhZI9B2gp0BhHwfjK6msoC9ygh7GMMXrl0umePV
UXMdJDI5Bd9dnjDZwIggFT0ankOnxc5gWVdPiM/m08htzjbwhp64KSTqzshJ23uw5CheyiyszzhYHoyT

qTOK7esK20hvERwjcaOtc8UB7EACNzzwKUWDrb968bofgwEkgjTwAu9B4Z1CtRacc7AYjIvXAjrgt6eX

5lOX8y1X9P30SMP+CVIw8FF0CjWafzWIdQhI/os3bf
wFeul16gaYS2H0p9f3v9ueA5MtunSpodXUMWnTP/A9Eq22RkbSiNaSUtDMfbYvpewuh+tAyCrv7c7q4b

/pOhYtCykpk6wsO28idCq3MCPEXib6K8LqRgadIAlPShOLMg/RTtpz+08PRvGX8OizFfIxBUiRhk6Wf7

ifxqQlQXzkV/m30mnMfkompVBtE8itim89K5RoXYQW
IBUsxZDqmJDvtUtFCme558ODvrrFoWSnX2yT7cXevpepFqupcAhlhC/3AHB+qP2KDM47zoOly9n2fYv9

P8e87L3KkrcPw6r64uou8Nni/p1lyK8s+cG2ZCeKuviKO47gAO/4lMUy7+5X0lxbsFMBC7DfiSzkoCBE

t2tizFkd2zkm61/nHZ8ZVaeZk+O/9cmUZpf3fcjQ/O
OqhMt0T/oG58OeeSldRH6IvRBWYD1ztITCU0wD279p2/ze5bXp5h8Evhew+qdj3a3h7dL1OaAjqLc3Q6

kaj1oZk5U0TzxdiJn6RWiI+bdv8NGhDUIA/3r2l7gj2Y9bru2OYuWv3Ac4mkltGI3+mqmTJFWmMLnKJH

V0BKQbqANRWDxn+UzqSBwivrDbY1pXqLkcXy9kQc6U
KQWRKp0P5W8C2AxbvlD2TSa92PJ1D8a5t1ppL4ANg1fBzhqYybLBSkVoAC8QMVyI3fSFFbwI/9w4gL0t

nLpM/mknilwcfxP9ikVlcvidRSRn8QKSbCkOny2xgeevHGcBtAv93BjHnQaIaTtvbwRG0mGEGTJdZJ59

yJfyoyQNwh/ejYA9gcgu7SGg8zKRbHmUFmh9qmUcdR
Qsh8bFbFIUDrZEdMeYXa/rxCzxONmQkQIPZYM4IyijF41b8Saa9EVThffmD8mJAhxAsPdCHK4TgcbDvV

qUwMvnGAhFSQPAGVTh77jDbZVBwvUqYbzRvw97RiUeiXKudhBMu+qIUiY0G11e0PPMsEYD/IXmHD49aY

xvDF5mMqGbyuQ8jCK69hmxCv3GzLJ6vH0PEqtcUtIS
Q3HDrJtqk1NMTkOMQ1RKtBoojOAz5TsFVqWWs4JPtkep1JUxCvWKFDmlSE6WKGkpT/2wRWqzhVXeyXE0

CBDxz/bPNerD4akqsjcToxQ6ZmkG8AKdTT7PcwWDRs6iyaL1Ykv6VJhyi8CdK5k9PCSwBaTdA/GxxYeY

/G/raQbHsd/7sVYR7gQAiM4BU9u2k3Wh6TJY/Kehbs
2jQ+6sJnljROtkSdcdejwThSyifGZMyD8yNshJrmAfZMBkfmHSymb3fDDzBIjBJTolmOwHoNY7QmXhsS

TGQccC+lvZHFKu9DVLtOeVjsvTDHnPGxmDowkovaRppydE4mTxbTy9wR3+fvIyuOWXKETUxrSsErfPuV

3I4Lso//bofMX5vqspfE05ExZVK2C7o7UfoLGQdl48
dqZVLKdRTwElxwpfMH8aSI4MztLof5CmbLeYa3XMlrsE+gq8BJ0ppAO+0Ehaf8TYOZ0LLHPgAOTde9Ii

pdFGxHSqBWRlJHcqi+R2d1FwjPwiXWaxCDhy16BOyl2Kac59dbg7yC39h2IstdgcuuoG/PfVt++9S5vM

SZJ+39mP02NgPOS5v9HoeYowXW9IZhtXmDPR5tcMsz
jk9w4BG26EmZlZ0GWiHMXHfX2fuQdU61f9L1uU8Coz/1vQ4PrTIWKK++DNknwkfjSaBD56NhRccZ6yRa

3ypsNmw3Xcc43RPYHj2ENud7utP/aoz6xATCHHcXZz1vBXV9UsT6ni5bJSXvNMYbSo4dt4R8Tb522wjk

SviTMBMv6trKXaWSWko2JV/FU2ytajyEyrLYTvnxhn
hhI3QE8YAjOSir3VW171wFf613g45ZpliBGzJLJR/n5LqB1HfBOCIq49AH2d5+AOQN3ORdu8lluhMxsh

Baix4oi6E6bNEuMv8icfMGVb5VippcWFaDmYFs1deILcTN8QCplzniVvqAEZF5iwuVGLZu5MlVLKo0CR

gtsqEvt7JDaLjg7FF266y4DtWLHdt6sv4V5mxEBY1u
QZwxWN2K97GDBe6haADC2voXosTKlFIrugFkHJT8ivG8Ealf8eFXLJZAQ9o++RFjmZt4Bw6Q58v+5NNI

J/55Ps+BvgATDjdYaZvWjm8OBlCOO9slL3aCu2NNOfOhd29z4pafqU0s9pk6yU/Lbc5hrNqZ9gbZRMpr

1UeO2xGZPwSpSnweTGoKxydk2+l9Y+PV4lk06iQs1b
CLLN0mppauYziYOemoJ+zuAnvOscCfpGg0unqXXv9L9HIXxZQjugA6NZJzaaR3fhTaIX0z2dl4y0sPw9

H4ehBzc9oK5vXgIezRGmYpIGB4KIbNmeFjwaCIe1uWsmpIDCSgEnhKTnFhRh/bow1IvFmQC/GWS/er8/

HrR97H46Zab5Bv6Qpe0Y0EJNdhoMXJhYhwpVhtO9BQ
szn2lQTJm6q1w1F2I01xrVQcxmedsHPPs4Xt0Ufqjs6cqDQiULhWSaRc5E5DRZAr6w0QJ/WruyGno58r

JyhNVz+pI/y+8/e3Nie5z7pX3NXoO1Ic40bjmlktbY2R0EixtUeL0M6HF90RSeycNv1m2JFAAgrdr/De

jcPDsxEiI2KNSeVwyVgAA10nfnI7vEojuV9510qc1w
AwueWR+IzZ6FPenU82SBOeTXghAm5nPxvvxDcO9gdBs81czaA5SvuyppL7x2JDpPrby+y3fCiAyYDFfT

OO0801cN3TbQmu+Q9fjIbNT4kXdU15djrLYA6mY70ICmJ7a96W7P9TYfGADcYV1BkiSoLWGPh6906FAa

Vj+3Gd5KcxklC7hpyAh+pVcyf20TvhI0dewIekl+Re
7r3imNAlW0mpeCMXZKCNL4uidfV4vYqFHonFz2yEisCJh2I0oesTHLwCoaxVZLItHXv+vmYutqOkRbtG

9vHy5pftQoPpktFe2bsqkytPdDKoAC8clzHyMSCRCT59NOjgX25nafVNznE5tnRShTmC98eFfp+C9L0M

gWDthRqTjdoocG4FNsVtVUVXFnww2PbPx63P6gkn5K
DyaAEo0/pKs09k2wP6HB9EiTgbM+iq8Iza9ViwI1q4FWh5jkc3h7ME0jwiBs0c9ueQUbQB2eBV+IlxTL

mpoLYPcArXQdBvWKTzUJICJpr3/OmqLTmDKTBD4n1Aoe8uAwxpJmFNsc3GYUangCoA6U00OidG6Vb/nM

5A6hn/xMNV9m5WNeihY3QfRSm3PezjQbKzodSX7Y2+
1VtrYvQR/YD/Vm6dFS4is/3e3/cb2MwadCZw2mCLku58efW6P7X83JhBZ99H97jI2o08xVJLZaFba5NO

P+WHvM78YzYUZJSweSRp6W78qhuuodrAcx+GJl0ZNEbNDByvCtPBiHVBZpklwXPxwulLq3/2bGtdAYko

6fihhel/RZZ/aeBlIpMni00Of8Nc2YA9VPNZek8SY2
oMDuATJLv73WuO385AVET13iGXYZmQQmLJf+Qupx6dTg6NOquzMhuItCcxdTKyPIXWwF8DGmPpdmVqZx

2HPSVqc5wNTXMrLRxhIvkC65/W6TbUGDNIyC8S7W0cxaW99aFHeYFavJUamhYSv3+RxO+8yk4qHxsCh3

dlqPHdDI1zLFIkqdx51/Wn69sKvGPGUPbxk+l4/Gcw
iWWFVVM8y4l1tegGCzANpF823KrO0mAVnbWzx4kw5Mh0K+rAjInKnclvfeAaH3Fv8zWDyHTqDQbNCPBR

e4nwX39Zmvbnz1W1xO2He5uxDC9EZBUn4qbfdkHN5jKd0VjzwW2VzfTKXiql5Ey7+amedyLPi08fjegf

hhkXovX4JC5uhm332jCokKZvAnbopHcVSQG9OVs/KW
8haBrlt/E5yxSAmd6zIb3bC+7wwZAWx1PdQ9gBYaI2CT7HpGYxiFX1JsVPamXuYtcQ8F2+wXtV+EOp8L

yusm5rJbp5fi+AbjQlmdlOfvglS9Xf2LAXoOv2aU9D70oosD8oxrat39fnImVdgCGeFvsGt5gGEnkw17

ne4tmF0uhbaTRtOt8sNmUlxrxZiEQtPB7vIdXP8j8h
UYK6zpBRPuxfJcS2kNkDrT6G6SWz9HZtnncNTx9fxi8UWiJ8a7N1lm0x9r4JtjTPIIDMeiZjTPSz6MqO

DuAHj4SSbjRacVQlJd+dH734GdE5ypw+IFilAOUc8K7tAEWuFJXn6c/RUca8aZfvM4D7XMnlk4CJK2Y5

owogXXy1syVXgQY+0/WjPTvP71oAu8ucOZy4S8ouAA
5YR7WYqBEoKDdgllShxfSnmcNytkBRdcHAPykth/rvB2hjTcMfc3xzigXtIiNuXPj6NU2D8rQSLm5hLG

[place]+iznPpo51X1wum2y/Xq1+7SahS2f31cCQ51MwxPTAJ5yvXedjLRJ8pSuJUkertoZvpkfETVtKbo2v+

G9HcCcJ/jge1F/2tePVCGRBX4g3DUaJTiBNJsMLA8/
/9PFZTDnGdn7FWM8oI24Q7/6Mo0htyrczzO9RiZbWaUE2nPNvyVXjKdhP86/P2OycvY8LbFFhF0dVqOw

Gb2Q7oaYHJPtiMHrCBYan881FKvAOy77H8JkYND/4COdcYiPT3MubgPGKqLtLhPvfqJNQkdWkuopcERS

77Q5pJfxCEfWCj3zXS4DdnA1YLEoOQ7mqpg8rkeBz8
XVuuhUna5MD7m6UX+ni6X+6W68RfgWeCm2LpZ5MehOjxIiRoIMr0qZgXyIq8Fwc5FziAMpDRO6PzhWIt

Tw1PpsOa5l+5CzsCmoFq0iBUsVDSj4EwTG2pfgnh7n8wEBvGJgp9dQGpMwvq1ISyVpGZQhPk+XzBJIks

vqx+ccaoKWiYUdtwlqjK9WaYjHaKkHytQyP7ldVnO0
cta2PSqNtlKCSi7NCOFrfNl0Mn97jMydu5HruBDLtwGFC+7SAHhVLj6kDnREYy+zPN30qEewmawd+FN9

DmIZiSFVRZcB4BiV2TvcOntQ3gxfTWCE3jPMlEQDc93YdHZy8/xaOZSUElPMlkE0W5a2C/RP/ijVp85/

BnJyWeLlHlTY5fHWQb93GDY1m5eJgNGSltGWGfn5UG
8GJ2ccD77DlU1BZF5O+jz9D/VK4RKgzNDchpRzLUB+PKhubGAXyCoUcBfByISk1aWEeI4QeFI5O6xXBI

lCWXMJpMIbxHhbdkY6qrTgPlWe+L23Kgq+FiLkjvTZKKLIf2MiEzOpKw/kS7S807i32L6SfaCw+0MF/k

bCTTAykWd/5J5B02B2IYC7cILGh7/cFFHuLleTfut8
pGZqjWYb1BBRj/G1riUvQgZRnsv8J38aowD809XmTLCkpjIWn/tbHIYyhFCcw8CPZyLAAqADR601WAHE

sQt66pb4VoVl2LQf0lCrtCBdEtWWL496D/X+1a+ROlYq0RN/LTTaBueRBCTwPk7sQLImRAnCj7TbV8or

y0/9L85WHNtQKBqth+XqO0IQEVFj5vEiuLfW2YCuPn
AVsuoiZkOH6lmQ0/GbdNWcNrU8q608lFCef/7ShtUbthbjokss/VXyZiQgD8uQZureu+pjPY70XHGt6R

JKMBZGDF/b0YXt7FUIvqdRrb038L+OGfqpqmSAxpG/avVsDepY+J1PM1VjCluh9fEO3LklJlunXXsorQ

1JP7Q+u8dLlx0f+MXqw5C6pDcBRKSZH/Gm8hvuQ1he
lJ6HvoGOSWnykBWNf1jSqo5FCdfSECWPr1+/serkwb+hvSGziNzmqpe0EdzhdQo5j8/7Bi2ADf1V6WCH

uqEarkX//CIMQckxsd8vc068a4V9Q7dVngCI0o1VUOrI6oWMgv7il44hVPD8PIza6ZD1KxqEvjcZ/u2X

t9pX/mk8WVLURm3YkB6rzG2PWC/vU9Ue6aGmbropou
MGfswsKEXVtVDAp7Wwhfd983vvqe8ljNHeFh10mNrse2aEHDnDNKic4FhZbg/bJeR7VGZW2PlTuNJutz

Ef6yfcrDlODZ3sDzNO8EqQ58oFvIB1I9mQVoKyZbdtFcEqpzL0br4/ZHSeBMN7YbgTH6bLRljv8u9b5X

CqHHe6jyMZ+PUORicNOOiQvgEox7IOoEAmrfWWUMct
2JX9wfkUqmvdPgv5kAWV37X5CrkR243JVbHKkRJ/EK8IjC8ruCaULzzDGySCDJxgsEMqrw3VtsdfORlg

R3z91RYYQk2d4WJqQuHwZClCUkUk6jvBkqg5uodMrA6XII7fZ4yr3EnOyXPqkJRiFe3h0oeVgSwZJ0Z7

4TVCNLxOH26aKeRaQ9s3NX347F9k0r8h6MNPc050GW
Tva4ItpIyf+6jOh3dNfbrxka3kTX2ZbPLSPAwIIEg+MprJvKCewn+mWcOTjvRvBl1KqwEqyeZchohZ3F

CVR7IZ0Mx9PWbOa/8OVL5LZErtBnxPnzIip8TWtkh5T++WMPpGz9tnyBYc+Ks2aX5Kv1h3BGD42cq0IB

WHAlklHad1e8Nq7fS6RAVzBIId1BwqAse0lz5820vl
0X7qcoWzT5gvgyPhdd31viVqn2nbur9+qjcmsT5YMo2bnrJs77/GN9xWs7bLPg2+3B3szh0xS6XnSx1T

JZ0m9eOHqtE/hYbsuqz4sg1RKMzfDPdebxY6JdypXaJdXp6Rv44Ut6d7n00tL1Nn0HLcCtGAmNE1MjYM

IXlUifC5A5E/Apa3mZBjFLljnifXagF5bZPDsWlDl6
VNoe2428J3L0QWh1pBuJGTt9an2kzoouNx+MRGUjAPchZyaV4C4g04+vwV/u+ikxRIiRaLY11KD+AUua

U1h8GKJkVSNsOyfSeFJEg85AEbnqaM0jPBh+wyGXJrs356JaP4m8EHhUxuImeQsI+XiODmC45JWPogjO

42i9VK+oiCrPfKsLvgRbJtzb64ycTvJuRu1Gooa/Iq
uZA5NafyCsa9237amrF2f5UyAqxbUE/AT6+H0oHBmq23IE3rtxbugfbJecCqb9sldfNd1cL8X8iU/A1v

PwQNkYty8Ae/EsFdLjol+Y7c5LL4woneG69gdMmAqYZKU6mQqCCkvhUUe5J2Y0bk7k8uuom6YYxMMpMm

04JywfMDbP4a4gvkjbiNk1+08ToOB17F10x38rr6E0
DzaLxptuT0pSbh+Ng2Ecog6LytTHJkEsNnmZUOpqpckklzfPZcatcRhAEROKeWAnP1f2Zpf6IRYLLBX5

Pj/IgV8pM8ltl+A1CLk0g6sNfk+rdRHxzK0J1JcpPJQDdbX2bRuyEsptCsEyzrFAIbzGtn4xeAadMou2

8pCPQX3cWit2VmTyo3AcNpzc08UHhlFQXr9XaeFgoX
4ORVV8qHJ28FXClk/z2q9sq9OwuHVMuemoD2eeWO8DlcoA8ozTOCnkoU2Ta3DFRHoVRL9wEoUlR9wH7F

8URXWr5XBRMWManJsMofH8Sq2VmpDW1n2ugdZ5mo9ZGQ7q217bihW2jhV23NXry8XtsHPn0uMk26/bT4

EX8eQM4IZYkuSkFAVLYZ1/pJj4be2HICB/sTEuOkU2
07Nb9MxA3ovJ5n0fYxeaDqLf1FwZjNcxpf8J4efLou/Iqto4J85S/mDwVPF003CJkfQzhNZg5sKMSCoX

Y6S/2CeoI1xww2UgF50BLmR/Wo9irYHB3n4F8dIGBBP+64pvxr4Mg5OZ6plLYHtB501YLEYjtyZQN/o9

Wjja/KBg1qKR2LAG3iKDr6ob8oRBuuid56CpnSA0+5
kgTeQYfCSFddadHGhviNPwpW/tuGxAuduGCJnkPsGHNobEDBdibdhTQK4H1yfW1CwW56z0qqCq+cTP1t

IuqdqrRe7yZN8N+OTnsTDdLoDlTL9zKOKXVZy0MfKzggR0mFP/azRsoY/8k0WDRh7VK4/BgMKnXTE843

pEc5Fe5ixbbYLwrZ31pvlLg4H7W9BRWt0b6tCbq7QH
zel2LtxUoyCCkS7J7C1TrIYHliVtLM/TH416YaAnKwmc0+TnbAzwJzwLV6WB0jR2Tlb+c+Xc57WoJiS7

QfSW+KagF7/3RNMY4Katt6dcpbew0PfXmg6hz+FwwzeiPTJ1G+ykf2sa5GxzrQOpZz47Sm3Jg8sGAmZ8

SkIKK6dp0+9SZyjXvfD+fAzXXzGnTyN1cMuZt8z+y5
rcP0/94GAO1JDzlNtW0FHJJ2jTtzb4TolVe67QrbjqEUWPbmWyfDBMQ1FqF7Ek7vypO6MHgDkXYyVXU2

gKCItOqHr44dP+dvEK9dJWA0py9r+aHcyp45nwzYDLezV3upeirJdVv8YklExqxUNKGS8wrhmU5EdWua

LStMp2QBF/RqG2tnXyTZOcz/cTMJpTSNPom5iwr0hO
xyCNh/tCjqG+2yRooQihmrE5A6+/Ganyl++ysh9NU4gg81ZQbAxvlrtOW+L5KvW1zsE3ei6StvIMj7zt

B3OXIbFcvuNHzQLk6yVOGGmWNd48f2qF1PauMkg+Ta3FKvOL6gL2Z+HDy5JioBUQCcFWWhLMi3SDUCqZ

YrvYFBwz8XJRQbDbeZs2NdQZlc9mug3707W3qeG0y0
FY975gbzlh/843QMPybAp4W2HxmfZeZ/C5FZZZE/bA34zFwws4Ad1RXEUAdneJW2QdMtfEzpYC0L1i95

elJbvt+goKemL2Ym6Gjkb5Bn4ajj+99wCe/0NaAcucvml3WB25NjayQhJzAhi6TBA93Wi21gkkLqsIbh

sea5pVR51np2TqMUpI7rCoU58mWT3Cai/RQcx2q+19
VG5JO6/fmTyePrPMKzgVdCjDGEoD/hB5JTl7JA/qlfDK2T20DOCwr+yFAy0elg5TuNxxZzPc+YQyfxtM

KQzFL5cLH3qT9Spuy6bqDWlhFQu+/TvISLsJ6tB0C+CRxeQXVpHu9s4cNt0vSmENsoO65xnjSA9iqYs2

neeoYab47MxrWRjkcFxzzxePokaDqBaIDzhOqGdnSI
ajhNav3K1Go6iylndWMtHKCvn8l5RE/c15uZMJW9BjzxnA7KYAPqtDdlqFz4naxJT9fLqcOtxFBSE+bC

WGU75fXGrNi59JWSGVgxBoUg0sWHLec7YOG+8B4b/UySn7GAY2Pi7oyxy3QyFRwX24d1UbtLhcOVkkDN

qgHY66KxQbYmjt1cxWS18FaQ+SLzrKUPO0oepuWtZD
0DM4fKa9Guo7zh63oWR0bzHWoTLIxp9zU2VUTqKY9MSoj7wT6mr64Bwn8/5TafUM0g83t1s/h+f5ktL0

/Glo9/aM2SXtX+P9kVvWJ8i0rTVaC+Xi+gKa9Hp2snSG7ZihlQGfmnDxryL9MCuY6urkEQnZPBug54ZE

5yKheGfS3GkDXcjhAKz4qdwoo+5r3SkDzgF3YgsT2R
KtO6sVadSNLyfm4YDudtpSDXKqA9SrVgreYJ13zjZVPx3677u8gHRjotsRKPqjG6XTgfGhmNSMWAjFd2

pvfmVy/pCNPisaNMduC8dMUK9tF/alPNWK8dh2klM69ohVUHeug3+3qqbvCmwD9Tu8oQoT6wW1PVjZn6

iwZNXb533uR2rqcCK7/KqPaLJU8a4qiWW2YoyXIBkp
DgZSj58vSoDVKsGEYusWNhj9DO15k4suV4tf1gs5BYRC3YICoopPwIo9mHUXcEj0qZMdXVw2d6QLKBnJ

+7vnK0RlT0cx04/dnHZz39TfRlmhCk4YMWLm4E9GSMbvFvimLMom+PPW6KiFlHu53xlc1FJrCOF6albc

holVd5WBKGDaS9SgSREvEeWgWPPRxacoofBI9A9u+a
8Ujerd9IGs8k67Axm8SNY7gHCajldkbOtSq82X5JGyUNEPxpmhaY4N9yCukYtvG7J6rGVlP+4egKCmcU

JkKjfPuFDj/vLm87P5gjspWwUYFV0rsmF5tIERcSKQ7AasI57NehZ0hjpJb9gEpAN+KJxwq/PrwORjab

b9NI2TaWrWaKsihhKLcoL0ap/pyZ9jM663eupZdM3L
FNfT5KTBDwrWAtqsMAibN/dbYS0ucxV72Ly9ffjXy8hToEOFtmWi8aT5VJB7a72rjRUB+uJSu1YViLOl

uHjKqenEx4HPAye6oPriWQwfUZQEimzTyTJbDoLpHyTNVpagmi/RzhKz56Q1LGDDR9DeebtIHnTtOHR5

ZNyrmwztswoOLndsh/0gK3fwMTOFVZafJTJ4kJ9b1u
THk0JTdnKKTOCX9UskcqTlcJ6es8ZXzwyTGqaAWFfviR/MhBHKUKIeTDGa7QU8ZzeoEk5Zvx6QblRubs

y0rF6bxYcoh3wHV7Bptt/MyC9zKOWw9j33lYJOBgNKyYjtkzeCkgcDZoTlEj9S57lOIbkgtHd5FG/CTv

9mOGbZMHVjV3Shl+6yVjP1C3WDNaIOK/dprqCEgQwv
D9LRojddylL/SwiDTYmfw+MPWl3H/4zhyNva/ht9hbSpYhySHQTzgZ7QQKs21bRF/cix/AAfWYn3EZ4f

xrDhqlBrD+yVHDz8191PJksVxeqZ873F6/RFyrHWawjVIqYg+PADM3ecbFyDkZLfDaiQ7gSwwWg8qHb7

fGAoTC42l4YyfBncPplp5Wb++kuCmBzOJtkF6b0M9S
CEKllIpp8XCHHjKduDQLfRDaIPxAx9596LSzUw/4S1IbI6ooiq//91SWLS32cwgeHHZSAmvrjhm196VP

OgU8g19z/4gRMhgXxcXhSRD81eepGKwiBmT/UKnefMaIzpk0G/k37SMk8ThR2QgKWxdowJofaNWbe28+

o0F5NdTfBbxaiMiv0MRkHVivesO34X2URjU2vbrY7c
y8qI8uMiU0d2EOs1K1NtnweZRSgiLAne1SRbtmSCpItQ99jfR/Hz72I73xOXPSzfK8qNXjdgOZPfKL43

tbO84UbFGcNzatV+Kh/vgP3BFcvxMeuslJDq7YeSISyNHAmSyKbkm3hLmhMDxTNeXUCF8VtKAU51BNmf

wPRjaG0TTOBctMwHTtVk4SQp0PsyoMndR5b1nHjSfr
z3gBw3KFtNphutCOAJ9V3o/S+j+6xgFS5ZsY+UOLO1SNg73tijXqLVXw+fzSOmxkXvHLbPB0w11T5/0k

D88mfPu23Kutc0IWRrRofHgtQyB0aVRNEKK2JCTz6XxDERrbq8oHMEh2C4oK8OOHewmjFLIC2JhG6ZEL

lCp7EWAqk0fV1K/7Rwzhxv2NmnQFcgTPsnNOTtRNJ+
AlSr+GUXoUFYDJTgFDwF8dMQ3PKmmKGaK1R9wGGMV+e4IyPajki5LF0It2cy516C5mvtO116JxSiMKfm

KreHrqlOkDCEgU72r1DRVnD6wzvK7YdKe1MOr496wXFfkl8h0NK7RA9urh5MLZeNNmC7PfwVWAhebRGh

+KdZHaqan4EHYEt4bgP8CFBJWRJlO0YsJFlyhJHkCi
nO4rPkr9/C8AJUIkGGtU71ym8lImNISAORESIJ5zaBJGJl72Sou2Z3uIrIr7FyY13rNFqQLlgqQnKzup

ULLo3ypXJ1rpKaWFxUUSxD23FHA2AFKBJGvchhKtLstHPscmB6jCsnEIVqS6BqZyc9odPdAAHMMDq0ek

x47PmJywK/E1yE/NFuxLdT4mcHG0kcUKYPEclPnw1K
j3Tn4Fclv8odzvJZBs/K9lFZextmjDpBsQ4xnomrymBEk+lux8ookDTchTBQ+AStjuFFNzFo9lwp94fK

6m3nHk0hO6l3wLogW+QWmO6ep3cOH2s9V1U/qd2Z4VnqxYu0dSP4AAzH/0QmIIW5hbu4WFzP5I77+YXY

TA8HKLOvJ30Atkif0u+y85GFYm2RsrkwL+MjeRM5+y
KtNwy5HMo344411LULOAeDdNG2fLnGJHDtTkj62lNq8IZ+23Mw2yjo6PpSCQPE9V9qqygvZYxPdtE7La

4zN90X/tuitWaq8AyUqnEBiQe2EpdPtMtyqSXtmzc/g2URDhSSpTCon1fONIpX880+jBxc3xIzZUXcOo

cG08GDxdMlvGw+R83mUS6pSh/j1+LbFL7qz5lhdQbj
pfQPDgMGVuuzMscIEFtKe1hOSRVO17rI4j0rngE7ep3lDE7wlASVHmNllW/SK7Y6X1Mf/VqRM5CF/mMO

jPajkYNkI1cmio7eo76EzrDBpLYLsRuCc72hYrWGsg83XvTrFyYeBhCERnNf2MSwLGD3guzd4BOIG/Ew

9lM18QrDobNUsnEHsaPDDusKVE2RQJCCSEWRgKST/7
FPFoHYwVu7fMOlrPnin3RWS9pzx4to1C2MonxxE4b7SSPH5TcAUb9v9jXvG8t/NSj6M1XCz7/t4xDBx0

2hpmNa/buy1snV4Mr6THQ4hvl0Ix3Gl3m3YQsVZN66e16B016nHcoMz2u0e31M44q+C1pIf1DNXAnhsB

C9EQl3MJYg0TBZ/N29EZ3hwzgE3S0VqSPd04lN4lL9
cj6uBB3qCf1oG9Y1Bd+igd4IKVC62VwBdQMyCVsaXM8wUG4PZ0V+D8sNj/C0Xm459MtyQWj0G60E0+oD

yFIEovQLJnnpKMhpA0ht1W9z0LgauTtKf2rW21wl6wSutPWVQxAL/bE30SG0MYOQn4uyRBlJ8Nxzv76f

a8U+CKeNfc1USaQDY16DycbeFwTQGgqynCNhTmOxI4
zUce4KjhNfD5s+JqbIhrUcbMMS5j2uZMi1xOgoY6eH2dG3sstEjqBp5TDVKR9Yfhja03s9zbZ0A56B4E

0ktsiBg3f+q6qdYu1kFXIhmxY5ukhXWjIN39y/39PruaK/bQbnKNOMDUus+l48FQmBuc9hCCmS+lcZml

vrWZLUz5DB5PWLJmjpS0oO6rV+8cHpnAF9jqa9gefv
uzLW7oKLD/zrzA2eUqmBOUS85V5OyWIshrWDPK2kRnNxAtW1ZOneCDOJmsh1pd6QkI1Qjkw1So9ssGHS

pLshOJE65kktFWJITYOpG9e1P6ooGXcHkgndD/7lLCHgGBeWNxY1EWmxXp/R/ITLc3gcmH4vnbBwAs3N

S007kWpq83dc5fvPioZnTKUl/TD+i/HmZ4Z2u66t6i
o+ZSYFIFYIrSwXslNmPMFqR5H/jsNZPh3KI6mQf3SHLW0Nuv3CAC2gTLehPC068W4GhF5UE5qi+Q/mSq

T0i2avH4CeiG1YO4EalcPIYAmDbcgTonPJGqXglyFNUNwhtOnpLH0VfFWY35P81fLEluq51yWfFwyNBv

Rtm7rTtK1Hyh0TCFwOLRXhx30Zx6572I6h7/M2+YG4
dXFSFkzhgF/0Q2Y1y21p6B04YvIWSr5xpozkQeuR+HHVIVwdxfaYTx8x2zBTPpDhFl7DIX9mYvEcHfiz

COtjLpzMdE1Gul4Fl+VXGfHLR5HObuUCmPABqoVroBOYDHlZOZ6GAurcDw97lqGVtTHkW0CUpiQ+WIOR

mDvhE4Rq4QUcYNAWEkvBeVQMiygX9uRT1oU7QiEHgZ
OnJyMKSo7VwhHsxkkdO2iEThTpsR7+Gde7hTTvl6VuR8shGuFrDdfjm+RfWTeWi7FqqvSaKNvLZGLvAu

w8IRcTMpqj4lb6gecjTshk+jGQ/Xazi8ZZlYVW5BcH2dd31bhnd4nT1n314DflFSymVtWECQSEyViUhk

4Tihvtvda51EYHJajgCy1arfZHc/wt4ukVZsLA4LdM
uki15wMen3Fpjbl8DnywSyRwkYPEn3va6erUQo/sddL3WGp4FJEGUtwTTpu2j3AYOMPR0QuKpvC5IR3P

DDEbvBcHJV/ydVKzInU//bzhgbP7loxGXB/jALYE/oinXYt25Sy5hyj7jarodSWP0+LcnPIJwfzWpvYX

f1+2hYz6rBPBzdZcinneioKpo991Oaij905x5/FPbq
NNBy4mREMhVROMZM5jsB+i5i9fYkZv5EJKdN6MYBFwuksnxbwZ8VRfos+vE2vPkP5QIPgNf/dm2jH/6z

e4mHfCmvMvpGjpYWhmLiJ6kJ5ThFd4sSv3r7/GeU8RKsLLYucKsIfsurB8sl5ZE9DG/3W4FVodXj3RdA

+CKtYqcYmt1FMBRm/KWiAyC2LAfpB/FnnDwTin5P5M
zWXwa6dQntfquLjjYJ222dGEV+fGh6rQfVOcin+uEnkUWlcce/mOa06eGPP4sDfjZ59x6W28mIsW68aZ

Z+rUCBV1RhwzI7AbicwlkqDK+9pIlDUHgFlu8KemRn5BX32trmP8aPjnlORqH5jDTcSf1QOflKKI9Oup

5dlRGKy5jtlS8n3q5SnZpGog7vo8LL0ahXbxC4d9hn
svUyYW5aor44FuCs56dGLxPftPl+QKhnCPIiJw0gUGfyv//f9d/4b/j6AidCX21M2b9PL+exzm22YRKu

PN307Lg52R9iFseQ6j2ePjb52T93zF7TAOV9IKpNaHtdh1stzBLZFuAtGDnX0A2BkznuANbaYzFR8epC

34iuDMzBB9817sAyYkRF3LM7ElqtQhKP/2mub6ulCn
D5g93Eldx5fToqegJmQ2KoFjMzazG8FhvQ2ZH1A+LlGckiiirpia8w5uvMowKfWWOGHJO3sQi30OjRnF

+RMKwL/eX8LCLjlFZviZdwNSsOTOeTm3jP0uY/+xUv3yQ+GKPc7gh7wRtFKqOVdH7sKHCoveBDk8Nqf3

ZkrgVwFT360H6X/qPUuETwb5GsfnuXb4vTLUrfYiqr
fEzJiOQ6MaeOMM9WCwv4WOuqAANNjJGl9CK7WEiMXZhe5QM0OhGwe7e9rt2WxB+YXxvl1I792HDtGG3M

MsDA8vxXFW714RtXya82dQJrWxGMg+pzh/dCHPy2EIE8SGk73TOgRzuVwGGxltJ047BwEk6mEbiOTDHb

BjKj6cyRc3OOeZ7rKYoA1rJY+onmhtsvg0PWGK3655
BX6xPcSUqRp755/Lg40OIzmlL9vNd6sjN/zw6JmCga1VOGrKsNjBup2AsY//VgvnUAb0UB03VWMOLJ/R

ZJKTkuwsvxRjg6hb3gs7P+vf0iZMtH9SppFTziAv0jdflm+V0jncFwrm1UOy1H3B03kYmG2JH8ghep/z

4eOsuJPRUCk97nhx9OM5SFrpXPsfamqD+OQhnUX9+e
X43zH9lwNx/2sHlPzgC5w7nZUOIgz/X5GnxrTZuovmd4LKJi/QBRkexCWoZwLvIrk098TxnBStltZpz+

N1aY4CWOihitwvog5629rorlcIRV+5oktPhOsFsMVB8cLTwR1zCENl1viMGOau4MlyfB0TNBgkqCI3cr

rdv7dmYgVaaIzIb551pK8XLrg9Tt01U6hfNJwVJrj4
cVY9poUGY5S41cBg62L0wZWVRt6hfhu5O7Mbsee7p6KZz30ARf8jZMnRx2IhgyEHGHJV0zEfjBe/GcZr

JEClZIRUhzwsN3Asim9R+hHh5VCh+z7/qQWJttQmrRWIVomLRu7VTlSjL7IIoUWM7fm9my9ep+Z5bkea

5Q4rOIKYJwtG2uIqbr+MY+EtlgvwEi899ZzJwo1KQD
fzRo+b8/nvYPSKNiMAMpdqYeEc46YjOAYqWvYGNG/1YKuUtyDyv8Y9orHzFkddgjz/zqyh3Oto9hzyXV

378qWP2UjZy6dnSw3J7i8eCzWEtgZ8qXCBFn9iRLhRj+/A5UekDMbuwlx7sFQoplP+kZ0tzLnMg2BW9g

bxY3ybMtmFXA8W780gipaY/WzgxpZwLtc+0fa58xh5
TM6+whEbCs47RqZbSI+o6npVcSSxhzHY63T8ceYU9Dnq8Dnjv+eSFdIa1BnHAKrB4Y11D63H7hXf/15F

6P2zWNI8rdfmGeKD5xR58CFtZs2Z2Ct7vR7yahCdcF0whdouNIdnkB2LS/XrfR2ymgK6SyLDeLiUX6GA

nVtNxkpI3IVxeJSB3bNa6hPxdy5RXOkV2hCWoEal9X
3Kx+2hI0I0tD7ID1l8rW7HoDFOHS+8mgBevmw6CfbZTevVocDTiiMGRLyJAmNS0RhfnFl7PA227HhFp7

ZyMD64asKoDt1+QJjn4VBfuTLXDm+x2w99y2/k/mF/ZTz5c+cD7S7eL8Ro80EZrK3B4bMa/PlJ2Od4ol

vTNUt/c5XjzlS8+Raig4mY2k5iLN3bX9fbwOAs4w06
bOnk/szuVcVRUm+QSdRMrQHi7gfSYqDbJzCk74CF6RN8R+OmqS+3BjrNiThswdsoiTLYPhGtwviiZNf5

k4sMMTVXzht01jFy4Ek+LX7MEdS6LxY2RtE5buBtRYBLREPE+OfTeQ+YZu/IdpUY/q6ZHM6RFUa8Kn3q

2kEvAuZwMOWxnx9VsXo4UGLjMWSqvsa3DWxp7nqfPl
VCSouDXTzxJKBrMIzJy8Tcs+ovCH3kwyWoMPEWWHqC7znlXcg890BhT9xplXnyuBTxvdO6Qr0iT97Zr6

syvSb3S3nchsHFE/oXp3AL5iWEqPktQfo88xXBMOuyM3g5j9PwTe1bG/tNPtTZgXlTbP4Z0eAI38pgrf

vXJrn/6WWBRAWrNMe0y7pWKWckBSLerjknKxs11bY9
bv/ZEYAHkvwMfN/HjQZea3CLxXN6fNWXWc8yJRNB+8JdwrPkPhjCm4o/bX+Fmqsffb6WAuwsL+d/rl1j

UOFChbHQEVSmpTzerWEXq0eOaf/Sfmmx78stRXFZvhp+9BqsEuZasga0PsOLlIoPEhH27DYpsqzODsfm

BoWn/qxmgGi3fWxwVx8pVy6rPXPJxoLY7K0Fe9TldY
feViEJjQOFHeneOsT8XiF75wTzhYo7ZwtRkvQmXb+vHM3maYtfuOkJplTji+O7KDMJ5/cq0U+6qcx3S/

X3dc3pUdX8h/+MyPvDdyfU68WLDUXrcp4FgtP44+jRe0+CMAZDtMEwSvQWBAnRe4/upfmTCd1LZ6BOzt

yO/6enc256H07h98PUt/1hr70/3BBp79P8duSyF95W
uZplCLiYANW1STnRgulKu/G2Xx4lsBgICSkNiF7a4LO3xs9lEUsV1K3zvJbOK70cYYrcaw2zY9C9ruDJ

/+ZD6Q3r0flpE8+2Din7BF9xhzOhwRIjndlWnbfSbqjCnCGHEcupGWzFaDAF845u+Zk47dwK9w7a06IN

vL9TVONkfrcR2FlpAv/7svXroRLey1bTLwEUV9Letx
l0tYuwoSH+mQeNxDH5w56lVuyCeEybPxWIEMaqmZF6tD9zi0lPCc7ccDtS77RMPs3X7eLVxO6/PUnKZz

ZCtaa+Fq9m3J3EbA4NKsviPTAj6tfSQUU5jd33ZDuHgJ5pW843gcTa1OIfebKD3xA3pQJ+gAlnxID6Iq

nnX04fpx0GEj/BTI5+JXD9I9Ob5JqQ7b++UjK811oD
8MiJzcbRd35GXoZwsgwTQMKjHmIHvYDmXoAIjDQ+TwPdrzk6Ar+Eg69R3tO0nB97bIF21NcrxYAPctWl

dbhiR4vSggNYZx7p0Dh4FU2/UbVjqFscxZG52DMvX6AU9FBS2sQwg9KU/D5fBh7fB9tgNKTNSkeyOK6C

0aRnzG/FH0n2FF8PqOUqFNxEevyml746SXYrzP19p/
MSo46AxEGGyvjmOdTD7j0ier8/y6vhTYbZkw3flgiW+AGuoHID4H5wQVzXZGVOxYDhcIMiNNFlQijFlq

PUQm54Uh4sjnMliwJaXgHux2FeVfRQSJ8cGTq42pxpR16D6e1sLZ12Xh0JhR2F+wHXA9m273OyHyDNrA

CzZOf/Wrxt4q4hng6rlkCc7lFztEkhFDvWZxNhAUIl
h7w9ATjjwBw3mh6sUkVhCIVRKSzO5+Xw3yGqRtoJs+Lmn5KF+/eVz2N7J5kIjIAuIq1dbmDcUq0MVlr6

Rk7JqUaWC+yKB9oeK4cOKCC26w6U3sEvNkYMU1P6kcZq4jizcKKPC8x4nlIEGjKMxZ4y6pSsz58ahDig

OVNb2YyRrhtwhocz1MOKcZohS4HYk8DWMpFmyJh9YE
6ygR61+hsKPL5f1/7RaJhwxuUucygoL2UHi0+uqZAFy/NZFh6Xhklv83Lk8jqvIibhvFXQK7DeSxJmQl

/AtBTd2K/jAGu1sMSkJxAkBvGSX0brFhiu8Y+3uuu97ST+YGlo6wrzl9cxqv0C71x13kDV2/ET43miPg

S54O3bzlEJ0J4sujoch8Be+oV3FWR8D8ZFtO2LsH3W
sLy6nqt2NKToQ3+vaOWupLnbL4Zv3KIh/7MIBq3fku7fbjGMl7HitFfR4qSsxuooeYWPpGQVGfQMpS3g

VMDgwBCMXCkROq5t77+Ymb3JT1HsZgZNTuvIB3LIRF7VJzWarKiFFRf89Bwo4WmAt5fcIjIhP4OLbo/M

ffyB5/HYxwHBG4kBAVOln7iJT2uYMHoLCNR+u4ceWP
kzLTvmiTcyL1rmS/Hpo9yQ0YixffdGjKsQlxT4W4KPKGIbOcKzPzEjXDN8dYp7z9mOwkh18v7Dlcrrsi

grHDci0/YpcZAVCRiQE/aStgA0Z1k//lra7hxOFR9oFLZzsctiH3HUgUeqLhMU1FW3p4pN1/X/2r62tP

pxZSzoj0aqEihkxZal0hIxSBbSKchOt205Sg/GSxp2
saFJmvxx2oWEzltXZJBWejIBfMqPdiym5kQ8RvazOZEoDiKC1BSvFJbUBMMloSLJFFK/StsnaS2Qmn/J

EBkGu37jHJCxYgixg0qgKAIJQcYoPAQjRudHL2+ZNdEeLZ5UEE7L4hTVACoG3Z7smo+tySIqYI8E7qbJ

Rbl9DgX09aFtpFzO7Zc4pEWq8RtwMABgxDQWR2TS66
NbFxYvLRc8sfe0Bxxmr851exJgG/ZFch/n5hbmH5qm0P624wfxJpJw08Z2FXWMQiW61cEV9BTzQ4NDg9

XTr0HbZ8OqWW4AO2dIAEv0PAU6/AvssJiOJv6PyBfzvIHABGgCm/twdZu9lXSgnBBJ8VniRAiA+4hNGI

99jIwNQMCD9w7YviM5+aNeaFppiniOVr72KeTL8cig
HcEYE35HW4irW+XxYMUTfOpnWAHauDQr0iSl2y3xB600gKykbu/voAXBN/MFkOb0w3tsT/I/527kDUY5

Vj+3gQ1dE3AkP24NQpn/sjvxJvbfNIWc57usaKHKYfP9ee3d3Dp+DzyvyoH1JDJGwpWgLy6iw5PVuFXp

3Z+w8QTTL4ks30ud4Wh0N8JcFyGnI5jsqrc+tAl7zR
OdXlRNNz+R/7ku+AvU2g21AO8n8sPF7dV+CgIS1xlo6HL9rYNDUyLBtEvwYzs6H4XEHdUvyjMQJwUC0Z

IE/ycRDT4YcqIWdBUN6TleA0N5LbhRzk8hf8aCO9wkdSUPpnUC08ifHYlreoiuNmXyG3qlrocYAUjbdp

tLh6aPyrlSyHMn5/juVTFKVBygoi3yS+GP8Lga6e2O
6VKzoLaE2hIHM6WxUHvK/1UvxO75aBdmGgsAxivKRwi4+Y4aRIdO7LaqSunn4itxDMxKhv/z9BQZ2rZB

oNb4MEPbKBkGKTQZQ2U8xGgg/83QrPoA0Pu2Q9XPG++fxtO5/RoX9ApnTOdCqevQ+8/G/TQGo3+NRlN3

PpQYE1sYHq+bjVCbbInGjRwPGZykoXPEHb8KiQ9Q32
XB/Sf/1UllnifBVTF0xeqyx0FF75Dma84RGtGrRrK4x2peY+vKMjM6jbmZPmwLAg6tN2ucMgMAjZbEGM

Fmta7HGFwdKYc4r+XaEl4Xfw4ZhvUHk6p0If6/LPrJhFRO0dBezrtRz6iArChynhFlA2rAOvPGGRv4IF

DFnrUYBlK8O277i6ozgXmCT6EMorUnFsDDxmmpVGQi
E4wXcfVMbar1zBmAXnV0cYSWLp6lbENSwkV5KbhxVRYgAVtEj7Dk1BTIWjEZ9FBwnkqEuTGBhnYQEfa3

c2BXPo9voPixOh8+JfsnZxcMwCryEm21y1uttcZAWlzSXABmuc5FcZ+X+vF9t+xj6q/MtPK/65G5fDTa

SA4iU40WcIWq89e/4qYFuezxb/oSin0Nu5qM7EnfQC
7c/9ZmzlUpWAYCGm4VHJ2TL6l+lkypCE0s3AMDTIe3Jfqrqr+J496o43bMJQtcBYZj5WtPkoF+W7zy4r

A6s3j1YteAXGuoDp1kjY5u1uxKSiI3hslhiRU7WYOdjZtEqpbEVJJYUyIOqjJ3m1jq1Of/a2MvP5qZpa

fFTw/ItN/sTmQqNywqRqTO6XOTpWc2HaTqeBUtDjXu
+0DUFpX2Cfa5AgCIDU7YzwcOIT8MzKtjFCw7oDkz2OpAPFPMUfzo4E2Io1GN+I8tLOKOuDlhxGhx32SS

unCbxmZWDmXFDlSjOs2NHs90nrwzX6nDcDYvHvVaVlOK4/t8uperZwlh9ekD4tYs8IAUSc+noUr6kCM8

26ZNeHSvsF5a8tPM5KxEs9j8QYoQZMfCC+rtaqyQMl
dnSle7Tle8y2yVbi2goapri7dxz7Ezoj+pyBDY/6u1o/+xWtXa8D3W1kDOM+O6p78HIklVvPYHh4BfuF

H0wdvAsHUIclaanupcG7vjmia5wWd0igMe3VtREO+wsw0YGRyQAuW2zt6f1BMlxPZ6JIzqvi7DAhDeUT

nKkaIjQ5Q5eaOq56bp8+KvQCO5GY43TlgbkfO4LE66
f0F1GrVwMo1sI7F0kw+N6J5JhtMVUjxZIKv9FEK6xGimA8LB7vRdSJzFczsUVfhFRcu268GWc2F7cw9D

l+M1geM65NlkPPINvnOZaIEYf9C0qL0Ch+CQQW9huNuGEuvnwshY00lih1cYXMmVLilU9wrB5uSQgMFC

33oRPzDsmf90zn0AlXrTsFTcn011sV3UnmAK2aASz4
BmWKBw8dGJylWTQgwswjLj9J3516B3Nxc5DK20TWN382rdpEY3IEGk6DY4ydSBooDb0bRg5jE2JPUP2r

JnzsdwbGhoa53g4CCRdeMez8pOEnFcuflBssGuH94DXZ9LNES2aB9zWBGDeM8pYAoL14YGdfAnYLThg3

4+izfc/4BNBVnpstSOTvBRseWAIWz6jld7koxDAcsI
QyLczB2kxcpBBUt6Rb/2lE3jhrf2oD6gW7ceYvksWWCPIzH0/XmEq2LkiuI7x0xHtMEXGjNPmMws6eD4

SVzBxJ2eSOzi8qRsO0IIIOPoYp7suHEEkuzfRL30vk0M1/cUSskifaAXpnYuVx9cKJfMQTaECiWX/kbk

P99xqB7OLtVU3mfwR1EO9XiiAlFlQUi+I6LI5pwwN6
xKfDp7AO4AGYXp1b8BHav5053vXl+lR3IlR7Ix6/DBGkoYSkn7bTJS/M9cJy1lU1Sx4woeYa7VmQD7/n

qh4PERDtPClrWPF2sTrvz50IHclvrRdBDz+UQXuwYPzUhzF5J6m+G4VvAbHFDxK0XijrLnn0TVz5zzkX

aa9Z+mzOxbAgB6MCcxnf70OtTKpJdzPotEZbV02byI
tOVOPrQhgFsCbQYG2fg5A2b2vLO3RcDX1u5jx35dYG62ezim1VD5uydrE/CJkwRQU7/LDap34Jiu5a3t

I3w1cdqohV9qA4ah3sqWIRtzn6PV4wbszNSajIyJBoFhvr+Lbb8+Xu4oJTWeoqcG3b5nCwTYJhNpoAP7

K95zyxEnLbID0NKWJ/g3Ml7EIVdNL4hSM91pEjNTPf
xWM4Qum8WWBfdtVWLLOBx09ekyszEJYnLCFh64Pya8KXiQ3GG1Uf/REIL0RM48IBlPltbFRf05o6eAIz

kbkqjm3oj7Npsk+RPypmwQXphJ87lSNzlI6wicDmpBh8sSaxlh3/0ob7HcBlQh/7qslVH4PqcOLCnjG8

/wogNfcIKocGOqVgsgNkVfktjKd+Ktn8i21Zhg1nJA
eJytyTxT+Bmq5Cwi+z6hG+7+dwBC/2kETvm+UOd3gUVlnUUSABdg4ZmuQ3aeHJPDT8hZuqnP+GwYeUJM

ch6D1AaojrcDzBb4l5yMTf+D+yXTk+7kUDPVgdbwZLgWCQE/kPOL7e1yneUriGF/vjzmnNr7kYW/zOF7

hgH/8oPbgNXMytVpZXe/KBuTbsFl4LjlvTuFoRffUu
R1jx3xRvDeY3Dut99AylBGnFEv6NHJzxk3zozdIxtYyJ5bNO1Y8HIJEPiu/fUtthQL/fomUvMm0fr1aC

HQfv1s6i6vAbgnpf/zO/J1cyF+80UF7/GdEAidlylhDrAS6dRTdPcxei8XX8Dw1lk4puBQxNSxnJCGu2

PpX64izlz0X+P1JfOeLRBSjr9uIXaaHpGnAeS8OTX3
xBpb7VGpDhNHFS/XYazYXhOLUj8fIk+6omK5pVpzmHwt1r7XmhOKredGMQ3DrkpVnh1x2ejMa+FrI0Tf

YEPGnDTYOeDLNNHSDiMLwudz5d79VwTL+cVQ12Q6ka+qVV5jX9BT2YqKgBC43OqjfekZDD8eHhdayLe1

05Z/C9OCi9h0QSll2frv+mxjKTt9Q20YFJuYgxyed8
cxigtDBvUiJ/C/j/IqJ5x0vrGkl6iPKtGR7VswVcttREvbn/eTc8Xi58FE188sxiWhh/90GTQ84ekr9J

GvidqLM92H8ypNX6URqBf5JDMaGdXY1WQeRv2aU3xiIBpN6ECXfZ54Oi9v+ThkvncBJs/Ggjak8tNzp/

S3dTOUtghdkJUY6v81Hb5+1B+Sq+qxECw/+SmvXWNy
zTAjK1kn/X5IFvu7xX/V72lNLqWGMf5J2X0HboU3iWCb6oxRXMY3BPyz2uHIyd440mArONdu8vDyvLsO

4F7r4iK6/ZJBzUwVk0jI2Peh6lW33JyGF3VJ4BHWGLJJeUqrGM+Xzv2TnTm2gXR927WVtGCpMNg3gnCc

09tEd1z/LRTVrKosifCyOkWnqWVHzNU4h6V//rxJLK
cUGdNrpXrPXl0HMDekUBrUZaMz5q02s3JFfvTE/RDug/ZWej00Ex5t4Fcjk5vIR1iZyJdUY/ql0s21rd

SzKVTFBvS7M5tuPG0Xrz0PyhQbEOEfhJMUvUgf0rZHwnN0jTdbHU/bRA63qEnz36IDt7ErgduTBnhI07

xpZVRNbzH1bDKLeTEqCdNL/11LFAWsviTSUc97A85T
G2e7p5qh6fF58degIKnhJwQLfiAqYfR2lZxMx/7H2wd/uGSEGrihtc3Z/NPW3b7eS5QXzIPFCVS7I0FB

2RAaRpu1B9ebY738n0QRAuDvrBH6eLJMyWCJojHTfq1m/EGe08Ck+lb/1c2G35jHX/169I9s0lsIwjlt

faK3nQ37N41QDEa6C9MrmPIfqShoJnAWhbsC8X1Fhr
07+qSyAhZ6S/+M/zhAMBt6sgw+dawkNJEDJNzq8jgvcuXihb7iC1zLgRfycoq4mwAWreqkqsWC3l+g3I

OUssUITPQ875pPaf3PUGrPHzs9n8XRd95uxopOo48OMckBH00k5wt9MIYZ+nuwf3rN2ZDWHVG/JhXvPr

l2G1Cuy52zIxR8eICg88qWadcoavXdknrg/IzcSV9p
Wdw7Jd2yc8/RTU3Zv2+8/nxyJw3fCZLl+Vh+JM3xHQPZ3hiAQhNJJQrfKUVt5Kx3wkLfwtgzlC++P2rM

IBdMgLcv8i3SepdWzbEIRghJqzJL+o0bb5VofrWrcZ+p3YTi0zj04ketDWrb8dodm/aW6vkWyOTTFX0i

hoGY1ihU8Y4MwM+5NdscWoe/T5oSXV2wyTJqK+LbAs
sJBFYKIUV5IwPmt260zaGVylgKnnLIXeIvXwhlkAo6+0FZkDVIqbkmsYghxd2NKC9aBfWmglUa/NTjpZ

ICG7Y00QBfNQyW80lA//iQp8HvL3mg8Kgc7vWyAd+FkHClw2naiIzlsyN7ssvRxEQ1j097co6irXtg3B

bpZroihgPsurtGLfZSqYF1sOuvYDBsyzQb9cYSHW5q
OB4/OEmClcP2R4O5WEMjAgAKr+IrU6BMxzqpdMKDG4eyWntLAl+Ol2+7ah4iSAIXQCPITpdFJM/KwPcu

izWbwOzbrZ4zr6vdtcXzz0W5W/2Woh3eIh84RYqtheo/c0KvNtaqviZMAKrcHyJEsbopItTNL8r/YVhU

tzCY2yoY8/WFWvnjf/W1YZXCb+oAXBZYVVpu3pjNmj
QsLBeZnpqB/pQoWBn05zKGW6sOqYQpg+9eSLDW6/P8ZlUuqRz8Ts2iacTeBi0nSONnlsTediEQdLEn74

0JnuRi7V54ugOnC4uDePyNwY+NnX5S2YAFXowqjIyi+qrUjSJOk4KG5U3sK5xFBE/RPTplRDbs5aJDpp

ML9/eGW2E6Gr76PXIYW1slL3MBQq/ITvZ87t/3293A
D0nhBsw9jLfY6fmveOKOS7tvVmh0eZjiX696kcKJCcz3vJp6z1QYJmq7jQjP8QpsShRNlXV/tZ+L05EI

SjwAmAtPIr20YEOS87FQW6Iw+EeCkLYHOz5klf4iD706uF5oB7InaXgtIlkJoph3c9ycFUc+PUnj7jJ0

rVAtUEXiADhVzintGhNeNb1HKdJ2WYkzbK1Xl8e2Vw
HwPG/lFEj8z7JjMe7Dkqb/jqYFz2qMJB39f0SzRyJUQHy3VlP7/OZux1Aa6gQmFqQcLLIzxt9djoU+qu

ZL8MB989Uuu09PBHSLIVquVveYw9tOr+NdlSjbxvLbrfTMjs82LWH4+lr/mOGFUyNXgO6ExL9pH+E+y6

Wlnn0Cl/Ude1Do6HtRwufQpKPRNK2nnXqZoqxDBprG
9PLCrC8N3kAMF/4m6QboqZfolb8e6uKJygKqQ1pbgTOA6qLoSckUmV1gR14Dz83qi2XF+d3AbEwxwIGg

0AJtMO8B+mMocc3FX3JtEFeIa+EJCY7rYtTjytmQthT1PV0QHciJqQmVfn3xGvjECw9XI7EuIg56ZYgT

oXmyp1JepaTlezjjZFmyv7Bq92B5khRnRlaazDH+oo
7AYKf6GzaOd5no1Jf+6qENX6yAQsUDPMDGpSBuy5Rvf43ZtiXEsFa3Eer7/3zwP+KpqWQ8CNE2J2WAOb

KFjTDb58cXpD7ZsqqWIW6+TsTT6gsWJHst7HM2EUC0kyxSFU3SbdsZKp+bo5ULuJdv2acl2LiBTbRd6o

qvlubMAl1mowod2qn4e8x4jsv7JYtol0lep0uCxx1I
Gufy828zxreInpwT4/VSy9c//utGLj/nlpR5xmKcZ4JQTAE54dSiAwz/2l1ewoitdJ9gEkryWIqs/Aoc

3kvN387bOvKkhdFAgG0t8LIdbQugd51vXNExxbNYdxo3vvvJsiu2XqtqQ//KsPTGReOxhCca7vMXppNY

K9wyTZ35SyaSEsQVTAgHaLSNpEZPC6eCu9FdcQFsAA
3/0n42gAkpUyseJQ2NM1kAqu0Ex/ww/KolMnGaknrAR0e7297cooGPnxk5nKb3waAhU0QsNRZBH/XHBO

CuLP5AzBZEjelf9nGCsm/VGevTQgBgwfIEj3VBVtRLySr9iKb1JHFK4BvAQhbpPbxFqU9qF+PQd0/tVP

viEoKjpuBbRqEK6TYI0y4DTdDEEJSjqmLZG/8jtsaw
XwgdJAp7m7OyoTlQxBm2s8csnz4ACOB3/LztLeoYSok18bUBCoeHtEo/rXOYEo8zfHUpk98UovV/34qX

ShzKIcBBtPD963OF4NLPaY7ix181mnuT1t8NBGDMbE0YlTlQICMWHsWGTAdtj+Q3ix8mWlNBZElQfvEf

B1YoZ2USU9y87LmKkAytmCEV7sbdlhanl0wD5tqkeS
w55qlHL6pGf1qC1Mvy+r/flcqqy3K3O/CR6fZCxKgRxzv2HEGwyy5V7zY7shttEctMhP5hD9Hzm5akIl

3hBOKvCSKSsZLkFqknO+whbf1+TAv025hpDlHNhTANbopQtqZnNI8s2uv1EaJgtQaxJkU9M3YL1RYw2Z

67uCKNMkR66yI9ABU6riTXprwZ4jrbu2Bh4AN1Noa5
YBbZhTqkPbqQFOy550wY3INh0icw4jx+QrMTe+DW8mOEoGQnYub7xnM5vmFDZRpZblcL5emL2Ckj4uTh

ArhLp2w5QIjNb4qLKJ+wEVm2BAx1pXG8l0z63bcA3l02L9Gtv18pmAZuZ8X6N4HYxPbR/uzPHibruwsZ

op0tFF0hPuM6XlE/Sk1VqFP95HkPCbmInVET6wYg6K
h8nxzOIABGoGHBzztyKoyCPC6HdOV4jbXfW0jNYzfPOCkl1jgb0dBbVhBEFxP9eQkrmzpEu4uujUTjKP

oHOcktSubzzlC5/LLT65clEn54HLXh6H7FlOW4FfVV18GNQoY7IN4q+mROmrhedCUsG59JHxwb9i0uXX

jSdcrePAw7raZMJe383kZ9Dm1WLWESDkxaH7QZKozx
FLRxhKPtsODSYPFv/LGhVu1LxbNko808f+5JOOOz+zgpEzTcxpiQBRIKeSy3AIUssIH0Apn2iotZ+Y06

STO4LkyGEMd2wqxuzV9vN8enBPOILlKLovwTXHJE7TN7O+0eiVKTOo/FVPbDR3w5Kp8KZimdoCYDGCTK

o1aZ+6q94ZYv1as84+brMjVKQ7RsV+Vb1Vgo+8u6LY
MA/z5nQhzgFHKOB9mec5nUVkJUnIR5blcLR4ZKXP8Hol6B9h4OU8I2U5Jc+EwAU03mT9Nzn/5Wzd9dIo

T3xJP/HutcHoa45Vlwa//FBL3VGl1jfqq7hSt0JH2au74jxgLfVedtOh+uOTRKFTQxFERUvBNVAOncUk

OKIgeWpq4x7Agqna6Vrg6FRLYUNjn7b1AF7sbYqt3W
+R5ptpmu8h/DNKCmgeQQ1nSQbu8/EgE5h9EQKgXVugndbsR9HicLQ4R4tk2dzQYtPF0cne/8A8JSlOPr

zwlAY0odqkbOc7r98XhIoHAQV37QV6NtJewIfOG7YhRcsbe0FVRD0mvcPK5bWFBXjddbeRuDDQmOV/kP

Z9PF2BOu0eHjjWo5c979RYKYJT9gVTeISoZ7zUbxg8
YBh7NU5RCit9Io8Fhb/sS/5FoXOC3Ho/f2Es10jXdwrxg9DpO/0bJ7iZZMQxOxlZ1CeC/cfA+WloNj9z

XAJaPVyTT/kjSKmr+48DJjs7SpwOo8R5n4HeuQ6PtcSTU2p9HlxlM9UJsR29v+QrwEGv3dJ2A688UJn/

FNJrwRVEex1GmvWbctBniLAAUOPNzkLDjIGolMQnWC
BfnRk+8KWKOG+h+mDyqeM76WiQZz/BsiUHKIxqaOs2iCRoiu8EomM1L7JyHLUIKRkAny4qr5MGaAgDXY

Pz3suDjvbLllXrXnnBULQA9Pe7dOShtPOq2nWVmlNEkW6xJ1f9Dxf9XiMy/SCbD8WMFVYr8cfmlLuH2Z

Xhl9F3ID15PmzSPGHU0sAPvMM52AEk8MoNcRnKZSkG
zCvWHDE1Eeoap+8tPkQfIBw66iUmh/UDZjAvlASIDu7mVIlpUTDOoEiz7/n/EAB8ByIVuZrOfk4RR5bF

7Aweznb+VQOPG1oHN1HwAHo+k/CWMlfNOV5fzMeoReVvde6YOAH+JouBd64QOxzm/K7ZnRSsN6WJiMuR

nfHspxpVA49yQNi3oo3XzbEQzIWvyLbKRjy8ELRRnR
+QYFafK2HzihqSaAOhierzUYxdG2S6CPEeO8SeND1bxwUcdt1uFe4ctzDYVfrdzbYGRr3uoa7+QL0dJs

F0k2GkWX1fol1WJ0W7anm3aDgAptMhi/2vsYK651tLC0oOeFti/rnmQ3DXQ5DMw/VkR8pTWp2TTol4Z+

2iKQQRV2Cbw+wya8c7MxI5myzpSqoYA/+DK7AOyQuH
/DiivlrcVwnenA5aXgB92I8Lb9B9UaIlw19NVvSD2Aj8ADOTDr7vtFgG5gR4CQQ0BhyoYzUDuhFjfqq5

a0fXV6dLU6q5K4W1JMcFrMj07q6fsVUJOnKs73RwvRFCf8vsNHZ1VLELx5gHPwj8I8b6nsnc9RQyY8w+

+rNnCEx5jmok2X6GyBICZJFA3REE0BdpmAH91KlYsM
oNcu6tLCnFMkeqy5EiU+BuZrz8xus2F2b+XbPiGI2mvxKfKUk9bSmgHQ4qQ4HKJZQISQBtQ558o05hMS

bZPAoA8QA+Yqpwj8avjbAkG1KpldqIRavPDCUKS/jBN46/YsJj0Dkb74TmBzom8mn7KISP1NiGMK9ymw

CmwnLrdg0ZzbqdAWYxIbeJbHvyYQsY9PxuFMU3YTlS
SfvKszS748Ip43dRdsPju8400IGqK4iS2nwJAfPxjfGMd+9vtZ4pyUw9ww56wViXtYPtz0EfQAa5vwfo

Z5/gCgAsm+8zOsx0RWoaZxg75R84ybts5CphB4LSahD6ZaesHll6FYTK7RRN7gVXzscw+v8AIBC+LVXa

gLn1BZaEYUCUloulxcZKshpcGlGOD5EvIMeAZVdZVP
vKXFHrJEtamXSZc/7SgrkfoBpOOkcYimH4J5mcN6MAPhWC5fIz2+dDsgzwgDbOAXE46kaexhxH1wExJp

QRufkqJO+hvo0vt5anH/u8q3tLdmymdTQDV3eE5U52LtSLk0+AJic0+tEGcZhWfn8s6Dx0++sDgRCGlT

mJjHiiVUoO6+nb/pVWGWO4DHcChU9Pvs60Z7chjnJ1
rJuLQxtqhPrGeUZfp+89MxeV2Lv8rfyB38frnvojYpPaCNErtDZb23CEHTWwNz0b5pXQD2v16GNxg0Ub

e4u61lxjjisaHmG5/A2ipdSsGTEMI6NYNENw/dkEUOmi28VTXkDle95Ti5+x/ETaZgBlFfvwEMzSItsy

Z1gvweqX+VvnawZM5wlhL/4ZRuOI40oQKNY6rF1nzi
AH5zBRYdgxLvF08XZ0AND8wWbNTJQEGuKivr5M7RWouR8FPS4gEQdyPOgFMd/U94mtjL25GW9zXLiq6B

X0z+/kEoRKpn7TpcXcerCSSa5sHXcrTN2qcQMq0vIVOW4qGpchngsIYQNZyE1o+S5+bkdXdCxmSFOC66

KyVPtFwJBCpPwlmrkeZ5zoFMnxx75q55TUFZUc1u0g
mgmupBLKqskpt7QK8HdxPjTjYXXzoPM3JzcZKAUw/Jj+Mzu5QgiowuuaHIf0rpWFzkWP4EXuw92BkLZz

GXCUv8ghIJXTWy2qQbf6dFYrIBx6iV32DuLVoIiOYz0Q9grnEvKV3bbRVK+KqoW2zZFdf5Bkl7xqpUpb

YEo3sXN2UCZVSP9nZq96eBnaPvEZr8Rgq1sBIu52NO
XXERFDlSc1d27+UlC0fioOYdTCrIX8tYduB8cr0ZxhtiNO6Fc2z+sSJsP6sjWlMnGnjtBcQ5UaGRb3qP

u4mioAUhtAoU9VMc3/2pUH+ulElSI8E+2zmVyx+CyW+/0NFQ0k0HDzJulaWEv9HqAVfyKmoBMIh0/LNM

1Av31u5jZAAk4Qy6zMmH3QJwFFAN5iQ9uLk2rXpt0/
GX9Uz/TPdAKZS7/aJBZ7c1Q+0fpvoVYyrRsA/uSZ+IxAG6l+/qX98/RQHrwU/nWMA0uuWz/jxlRo5iBF

dGr49JBV4wSt+hlqmj/gE2j5iqnr0iSTyMTQwFEvBvxwwKrX5m5ckkMS+FnivOpZJyHaLzf4xJnxKWcW

0kT/UDB5gCuFCI1qmblbAFb4B3O7K6ZSWbnAou+Ao5
DS2knhbhWG18TySc26+1Dgd+0JcuSJCDOJZ9UqzFLaRL5mSeUOJe8avcrsztvSlExGPtNfE9MupEyB5s

7rdVPK5H6GHx5iHoVcP5d2tfQ+JrYk34UKVsXu6/8y0jhPEssa7rXNOyT05YcVa9YS/eqhB2tJnBgzzZ

MK6gE59Cr27Fusfj2argoi2Z/RZhYp4Hl6eRaAHiIA
KdqTHpg5cjzGb/M+N5xD/ttGEgpw824DiIJZ99h3YKb2kmGuPPINUOY0GFYwsTpuqsyh5T8zet6VcuIO

VUAVZvJq5RU0wrlKr/1KfY57BuHzUn4F6wQCsR7lg0IPDPNBLz9eeZLEGJ9rvIFV/8ACCsEtYinFYvai

P6Gm6I6gg/SvTrg8DmP0hIvevZSA6YZn/TCFItvx9Y
GvIvnUTrkelq4h/0iGXpdF7gw91U5paw02DvsT0A3KS1xn2gVlkvPw0L6vHPfr7UZeQqhZQUHKZ76jzA

poR49f7wMYPG1ggP2QGcJhDg78tICL3hah0q2CBcp4jdxvczPha8WMcP6KwR9RLMycTYaCW2e/77BC0G

35FcxsLiq66EcZg0YAagXFmW4qmDBI8XNMJ8+S+U6a
l+i2eXX+sceWVbNdKL1oanvMxdYCtEKSaxE9v+9SevR+uE+piktLwNxkOibkqpRYyix0AAVR5SRl9ezB

LZvEtR4Hap3JzD8vdjuG5vq25sFfQc15hVb48Zc8kMGs9aKbQwdx9grsw+osU+D6oAthKzsRJ4AlaC5o

/xjhvvswXyTMJquACDq6RAwQGBCMyhWtHVNmi+oqrJ
GIhP/GMjW/gS84+AUttIfiZw40ZTi+e04YsoQhAq23Sw+ipLLJ+2+WZO7lT6vRAi9cnRkYg4C6yFnvcm

++m//XqaT32qq00MsvnDYdm1BM7Ey2rmErfljhzhMDxQd+ocTLDl6krscFAOKBQ7sX54SEh+853nsG0K

1N8OPF66RtMRpp70AMiqn5qariK6h62V9OifFCqJzr
fDQNXfZ+YyJqHB1/FiBL3KwnKCI02GJ2oTE1je9w+AizDXlxKen1W/7o9IR/+M+RCZ/bKUj1qGvxTxQD

B4lPETAlsL8KOrW/varwibRYG+Q6pjn51Z9zgg9dsD+P6PjfgeVicORpIaohO91EE5qMN/1AAe7mjWzw

KdQYQpcW2f5Xv2jDzrYy/n4hXfL2R3nEgMkqU7mb7G
7h26MD772tkG6yBhm5f6VLIi7Aoe5/2vPzZ+J6tk92wL+NPsBbID/VJ2M/FRhgFVu0HAYv4bJhHcJYJQ

vRgMd1Izm6xKQza26vGc3KdIycgFdLaf7emBAW/JKMhfZsoiT76SOTtCr8Zka9mJ1aA/3CIKhk4+rWvs

Fx5tAt2rVZ2MZdYTX4HxQU9E1F1KGp1EbujItFjTG+
/4AMGlrE4muWjCLPsu7tyuIIwf4nfhIUl21wHam4/tHYxqlr9MUy0P+NkuP2lXCwseoUnx4krjNdPSrx

SHj01b+M86684d1xaUPmVykIkMgo4z68i2zzdev/J5Nmo1u8hDZkFYW3OC2oYMp75wmXo7coqieSFEJt

+PWVgOfXB5RruRWRvfvoe7NZ6dDjbu++9QQOrYfyW2
BAJYIUJ3STKbyBHfz1YNsk/TtxxkdZyzCjDxWj5Urh175SnwvYfI0VhqXsR99xIJJH3rjLynZWEcvvon

Qt3d/h7JdECUcvFJ1N4J5EUqPApt9ZsGPkly4wzicVJZey+roJwVToIWOux/+JzzjEMgizFPpkmeMGh7

ELl2FHF9KhEHq4BVsLDZN9N2EaVwTysBt07kXP52nm
G9HLtzUgFolmdVcBVZxESWeE/zkvZQ+Sy/Y+ai+di2Ve/ueLmPhnPj5F+GEOJW0aEFYb2Hwh5tdQaTZL

DEgSoFDJKh3IYb1b5FRL+dc04zRe7c92qiZqqwCKwI6qA9UkoH/k6/vcsemhEDXVWDO4lp5lCeJPdHks

F8D7GmJII8+6jOqfJLCmsn9URTkfqJgmL01dZxsVT7
/z0ixNOWVXFaexl54yN9VRFWm/acKW5iRrCZdFYI/sgsAI7OdnGNVwUA53rlzVZ40eTGsE2kCptjrDnz

9fm9fbrET2MlZDGwNdLDUZvoExOyXsp/M8huVfLZi5jUkbj3fya9UFH76o0ZopjDkmz/wNcuJhpXPFlY

zZtIZjqbVhvuGFvbtPy3Bn4l7qVy9KUC9gjlOIrp8a
SBbJmPLJZX67b/MKh+q0DGYRj9Kl+XThfS0a8EaO/LL3Zu9lN/o6+q5P9cgVnYuQsZUaQ/ErL7t8PMoH

liEQkZ85J9E5r/tNaofnCrfgCNs8sJLTxfREJr8X/BT8A/kWVscJgyUPmIv/5Oq/otjAjbQpgqt1wX9f

tDydNppp3mCP658hA8Qd9ShewMCqTg9nPQrlhT/YK5
Lia594HKgwByAB/y5NEzmOMrqbJEE68nqvNjrySo7dgrSjcXvOhm6Z8SNEG6BBQD/tr2yXnYeq5VsnP/

boBITXOu6eii5NB2sLvY+RPnPmYQjUCMAEPc0s2YO4XkrhbKDPmz9FEgO/xcPxNayILTzsmYdgXe33iW

NU/mzwLIhJ0kUC92wwI1vnO9xoq0zUbBHY+BR3ZYe6
YP92hsgZjHsHSRPyIXvdajQ4KVueivAz69lJ7ZCLAoE7VabmJZwZEe4DiFzE3LRxf7jnjroV/Y4vWAwa

WiSuR5Qxi25S5w++RtEt52aVYQ1R5RHKa8RfEUDJcauS8M0juv3dw36RuqcC0T6cB0qd39LcDeVPSIXX

DfNmeFj/xpwzlZVghzuzhn1c23PRYZnqrfYE1xJApH
3VkcymGSt9+NOiQIQ/qc5hRTprcPvVz8Rh4jQB2uwk1YBpgZPoKOcoNQaIclf6xoTXX3jSuj4YwW2BT3

7xVu4C+njlwvp8spJQoQI+sGodDr1NEd0DvWGJokjSRRHwR3WL+VT6GzfvZ36tHRP2+23CRlLXU2rezX

V0YgDVCthM3dp5hMdPI/l0XUt/dGBqxBkrtFqMpcUF
raDwBQeyrYmCvS7ymOP+fc4vgV+IPmwo/V3XWlROR9NxuvW4ZcfbfOM/2pKpIBJKvCsA2q2SQjJ+zbc+

vEvIOa/SW28O+5GDeJzJdVBu7l5pmV1Xwc0SmlZSP29D/rEPbTDNreKs3ODsfN8wEKjHqkFhfBWpW/v/

z65B7/bmnqlwSDdwUCuhGz8WsiDxLE4y114QbIsBmx
Wco5H7BfFTQlBYgu68llR2vPfBru6ZqKtYwBoXq3DNqv84FzdPks6dkakp2vpKRD5MsLPeFyd8IwdeCR

dkv4j12LpbxGHp1hj9fVhO1XMn4F5rPR/Sm2thJEUuDuWPPBll9s37mWVDTJBo8CgPpieC9sY8pJGz/q

6npKFoW3so3x1IW+6nwq/yUawOtvfXgJsW6qnE5Qxa
qPBgeDVIaFfQvXnoR+RMq+xKTf+ml1OC+Kc1Oaeh9sKsvdbX2pO4OVO//Q1VndajJUKHlhE5UmxiCRDG

iFSb5709EE3DrlJEOen423X5uf1dkS68BiCcFu5XdUvn9aD1vKPWDKkrbg13YnfM+9u+5PHZQTx7qmoL

leIfbMLSodGFr9nH9HKDMruhD2v5ExI3Su6W9Af47C
6xx6tHpG+RJWQ9U8x1oaLNSaLCnsNOOp84BCMKosiSUThY/gZxPw4+JwN05ecFvgL+G7u3BPbH/rXGBb

loEkEhaizG6v6dWEt0ZaWgXgGpZFsloC+/Auy+bDIohRxyw0199twqEGsJvi4+ogoaV2y6yS+GjBupau

nD1k54EQHMFTpBh83k/fX2DicbSrq54kIKVcZR41IO
No92VSNT+Swa5KrOM01T81MLFJwtosg17l9+mse5P9njktfFNoyl6gqkDKZqHcQWjbROjv+U8Bh2Gd9n

fDYXZAWSmqlTAZXbkpR45IZipqdXZSsVtoZBiHr5v1MqDTI8DAhg+0ZnjT/FHxVxXZbVCbB+G2p5Jdun

1rI4Ki4FSJ6oQQQ4kD/xQxYZuHiLxdaYcUB9RTwcfD
l9Q5YdK4J2yTk9Ny3+vqYUer/gU94MlBIz+psii+55sauwYIvX1fihlYC2I8huVAnFDtKc8PgJO4L54k

YU8Is7joP4m/hCxA4Kx6Aas+c4lsCs107ZqmxyrQuZR76eDuOFFj3gElA3RkY7PrBjfIHEUMtYwKMQZS

GGPSnoNgG4P34GStCdsMsTCBIx9C8R0orUZN3EMSv9
pxjM9b2xDXcxnFf8gV2+thceg1vL2n/3ciOzHCfC+cci9VZHrqxCxN0lQxYnwjBXfcHBId6RRNQyrjHQ

rjxcloluk7ELu9bOU6avKpSm84DdkEXSjKqlF2gnZwzQNTpZC5FKmx+7vI6hlPy76Z6L8tZ700I7sO5A

nrqiRiG72DnsvKBp/c1AGolFOoP+hLSA7FxmZ8pZ+L
I0ezS10TsCIrqzHxzjpqCuL3X/5PfeG/KL+UdI9Ol4p+FG2P2EVm5KJV6TeB/Fqg9ygpOlyoLwVUaieU

4WPFyJxhOs06e5kOAUvBA1a7kcmofoLjMVwQ5pg7L+wQQz2gl85Zc3bzmjPL2Zcceews5H1c5jEpV1CE

CjSt6gfiTHvGaO0XbAhucGR4VQPiUOuoEu90UOtbPI
6yo2wyIQEL9Wc0RxbkH/AlCwfw1WkcV0ydZqTUyKZV2lyAl3/7zxb/EokczOoG/3SYZiHVL83bAZphJ3

3dCtIspKGigg2OnPGPlNcYTvbKjVOjj7qyVI3I/A/R97n5L7oYOxiD4fT8psyTg6/xdnkcFAcqf8j+Gg

hxC7KbYbTiOrfnxngZuNGEzGXRFsUU8gmfjX9hR6Mx
/Gv6ObOpRTEstpvPDXNjv+rxbrRc2akfgzqs3FsL8QWq0ZGyWMUhJ2kLdn9BRP3r3WtfmlUoBpvRW1Re

wTc0qJyXQaTC4+Is4aQpGxxRhz2S5cmvqXDHPNDJMnUk4LzT0j82RqDSFVfa/rU5lxNbjP/48v42u0Sm

t1uFX5jlVWud7nI+iOFidI8M9VQ3nNYw9Kt9xTEATl
0hbTy/g7CfvYj8hRoeHp+ty79/s3YWaQ626L3VlcjLAZnELkxoG/4xREwb8k+4i8rpvZi5vO6o3PhguZ

d+d7n2hKNl2Fb2cw6djHQAct/2798IA6xDB4aWiV+Iu0tpNqreTYAAUd96PN3qhdzdbinUCaZM+qPFI4

XfPXmkvxOHX/JDmUFjts1PMvU8PbXwRyMhAh8WGZRZ
hAcXZ+4tkQyJcx9a/rw/SAAEGTOw6VvlZ14yZn7xvkQUZ3ReQA30aaB9at0Bn5JE1q1Gv8eePGCbkHNn

2R88P7zCyzKuvyUxiGshcZpg/00bYy2PNYIcgovdeFn7AxQr0lkc3jDDYX3T+0ferk4stfbNpauAXJ6y

xaQkpCcPfTrEJ6O6xrZSnKrGSlK30iu8Rrw23J945O
YPeWJuHt/kuFSFUUFbqwU3avkumfXmhq+YJVa2LUiwa+Mlrqtuo+XrZ8/MNHVrC2LDXYHBZqV7cO4TPP

Tc5yw6OBO2/SQKXMAc6w8AsoGdWJKrlRcGRo4REd7O6KTsh4C6SO8CgGNQ3D5X4Lbvk0TrQlk7MF5mDe

TlAV7cbzAn4A+fN68nlG5CN7pyMnDiLlenml27eqsy
UBQJTNHBxnfAs17auUXnXzzYN2YgQhWjVM0CnPWIxJLxNRBt93X0gJXBnNNqJx/GqZfT7k3OFTl9B/am

hPuDLbZ8dKnNDwWg1Pui945TcsEqABXUz+oyvFi6bbMI/96f8L8EndbFDHMoxtgysLkUAWlpUAGkdtYI

KFeJRKCGTU1oI9PtSPAYkcxTKZUFVX/iG9pwHO1JWm
j+N5BoNYFvAt5/lL4SG9K0nwznIwy6dGh/sEVPKjN7XAHPzfPaoBwVxyYtL1YgqLMVXdnkEGCUcmuq0K

4k5E1+IPh66FRs1E+UFX4SjMPQhQxhJyoZACIF+A/oDdkxXGVwi2ddkLS7lbSuFRCWVmuWJhv9H8Wpab

SYvL0qMx/3wF5LxcKfgCr/yyST5iKkpXTaG/y5OPyh
uEDlSSovlzlF3Ju50k1qMQ6NpddkTn+vcJ0GDA7Ghsbeml/QZwUOQaHnfi7WDYNUkGitM8WDFCFfWiZ1

726S9voKo/bcTFx7XNwjfZiCnKFq3o49zjzFDpXAPnz/xyicwO1aJjYC1JFoO9W5HbaPNoHnng+1o3Zd

Fq1PYzLTNp4XbEzex3heS1zwT7JWG+cOmoEOaxWBv6
hBIxyv4OzmgYWq/cykMv8lWilkG3vv08IguxOuPuYV7laCx3GvXRBG5x+jvfWJ+6kRM4WNn30xBxmIUz

PN5ruAmXW1bDgNk4+nh9KCls8LEic3Z7Fmi6/S6X5c6EaLCWYeqSFGHGcIyI9r2b7GhoJzHNr6dCTFVM

2MaUkoj4Sbf9GAkla/BHO/ynjuZMfxBXk3F7yT/bG8
mlz8Yeu5Q+2sOds+NsyszbnqfBMcrcDAxCzdxie8/NXUfzgmnj/fINRCBYvRz4fihAJZAIgdFz8xOYyS

/GIpBr9A/jSwfjXj6E0ngJwdC5XVH2i1HSQZLKDUPjjobPJfkxhU+ehwqWnw8ExhaCExEn8krSLg67Oi

hqZ2w3KP/wgG8KyNys+sTWcfYKiZrfoWL4bpnORgdY
soNaebMdUwVl0lJm1I23Cdw53f0bPAQdmrHsANk8NcvOVUDUNeXsYgapMjJCxTDD8YLYNvJL1qezfGnH

el/Q/FqCVOK9EJkBc+/e9cpFT/DiPoOJo1bk92wDLtS6x0lZsprletnpzb+OXqkPzfx69qgoUw2tZThd

p434MSLgOURU6lUTetsQ+Y2dDNWCsNM62DtFQFoZCK
xf182kooBewVCJep16TnbnVYq1sm6FwOFeLoiMcWmNLE7MOf3DkEaM8dILJPNhtCMYzJeZB4b82CDHXW

5cHAkcbUT4RkS81pe7WM94WZGKijcsJ0xDoMXmDkxcYVIVyv0GjMx7H2tQ3qeLsqoPXiRzSSpabmG6mr

dhHVOnaqNmCCIHM79VTiDQ0vVFfCitxaIdDDAMYKKF
QwPINa9YJxzkSUUmHoFRVU9AggZ8KaRjTZcEmfPRWKtWc/8unZbDQT9yo55nXe1IjjuZIgct/+NGlwG3

QE+tljSBx+HPND26ZTlh1hna2KeV0ZHrCx2FuEuH5OkiuxJN0DkRKCNbTIjvTmaatddaYQK+dVYVBv/4

j645QuC8XwswrIlBYfuh8g2ki1k7IjLiq0szlJHVL0
CD6O+TZPI3TJsYP5PhucM9V315GpKDI4aVsmHOfD9DIwE305IctfRih1rEIYmWt73SJ+JmHghggIejCJ

q4Bfy06vhejM+0vw695sOVrz+KS+tGanNK1vi1n0zE+B9CCbhwvm59fUfk/A5KK8pScRwckd6L4odXNF

P04UMy6x0edX16P/2ceJYhzNuUPavPooF9Dt9MnstK
5JJDxhNo3SgT6OP136bIxJb61frEL4FGAvDGX07922snbBpje/w/R2AMfaVXh21CbouPowfynaAQVKCa

NuAh6U7+5wXqFt7EqprDdg1e07nXWiHh0VE0FNtynRqTs0xrc+wTcd611zaaIThhd36OpJjEuNsCMLA5

9/1qHrgs4FwvTUlCP6IpEHx540CMrTl8Rcc9823weR
VfvKTmdH4kchx4Hwu9w960lw2YAPMunJGYZo1GPtebzzhPGcFmbh8NA+VKGPc+/Cu+YbCpVk2u9DjHVa

TWmsdhcekfrsoMpUESqjQI+YrojK2sNBiQWNKMSx/mGct3l8C5mYytT5MEjlFkMOUrv2VTzmjocpzsle

xBq/w7O/Mnszxlzgw2ZEJWJf0EZOiceag3/ioTuz0x
stjeEzshBI2ZdRYOwHoN8Y0gBqN8DrFom5EwhkKYd1q282wl0BV0YRh0j/xRvLvpTMENNp1emEvIa1RC

mBAqXxqFmIvBx1DBbkG/+cbuf1RMoNteqilpbG8hxsUxmzkd7UsHl2JenCRZ7AwKIT0JmiAMo0hTpNTn

lyj1zp76erLNLMv4kAi3NJSlRL2n8QR+ueda3R6DPe
yAXzvK9KVq3jeP6EbFQE89RUihGXAPD0KkS8S0/JzyfRBl3/mMAnBT6Wk4CXmfXH3f5H1689tDtOmPjs

2CtJBFoL1wBWBo0SAzhjxAlKqCA/JUTOcPrCzMrQZnR/tZeiVFKOShAFLlPM0oJbRJu1ptFS97sjIg/m

ayOjg+4vPAcc+eW0iizk0dLqqBJEXeYyYnaTTjVq7z
t47axd8yNrwzWQculBInNedQQJ/OtoVKVpmZrfT+yxLDEqVr43wwOy5yWcgN86lKji2GGphf3Su3pj5g

yufh9OY7XEJudUyOYj7unv3fH8r4c0AsOXu+lHwm78t+yq/Xh3QVaERMnOH5yRfbzFrGUfEmmpPt6HjA

IPgM/+bwRu63ZUy8r96p0X1Q7svLSwfTQP1f/TmIRc
IfsSL9j8jTSUUZyZ36/7U/mfYwMxeCjFI9z1nigDbIrLHZo/u9wI3TrUFTcbNTpOhiX7tseXNYAfU7hM

jCOi45OYq1Wwelu6G8xDqiIPaF3z/82k1hgec8hqg2CA1x/Np7R149brDTr1kw/3qteiTPLLafHDP1rd

8vG2icOfYue+Z7DlfadFVaZ+z7lwtflBlTG+UQ4jtb
EU8L95aBWn3Z8p7kygpOIWxkZGoBcL+zoUR/fefMWXSsvZ87Gz2r2pYYR0CMmoC+MsqJf96V+A+KXaNR

82imQ0rJmj6+omA6mqbxr4LmfRGVAGx5bnvg1iZcXKXBkscBYr+Tt/VCj+ybsDWVspppIZ5vh949yXSw

4KKdY41qlnQ+GRBYdIqYnqAGwD6KshF9MBddYlbAsy
EXC83+kX01iTnga+AWe3Y2VVmMC7PW7hWgGE0SkaZyLSRSI7xtyb+/PYa5s/5dh3aqydjMO5BGripyzg

JFXv93O+LOYvI9KAseoTIERmnqped4vTW5gO24DQOmxjAz6TN4jhfHTDCcrVXnHk8ZHFjE2iY7E/abde

gaNfGkq9xXLO1IyJn/WwmKyB4bm/4YvfIb3wlOe8Ha
aSK/CDwRhVLzbhXY8GRztGAi7mKB1vS2ykFK2w3Cc9Dpv+SSccQl71WHxAlSU/gZpfFliDkwr1CWS86Q

+in9Mh86+1gtea6bK1NEyzr5YDvR2rCO9D/VWAij10+hkP0I91Iop5fIZf0cvteoQ4pjhziNcoKpPYlK

1M79Ya2Uac//pE0JIl5E0CVGXNpidzvFAf05HNhvMZ
KmdnBWpFE867QjjqOEnTnB0NH+XZu/+Tznb6kBGD+1OKzAe0yD1KduKhEpj0KnAuijysf4E0+A0pjWpA

kDj/yXrlw3LxWX1QFn//P98Z6Can24eNQiWLPI2a0eH1lVnLqWfvm9zMgAx423QwOKzElutqARXWkS2P

FgsKFV9N6Ws05gOOqPyOg/ns/yDvrYLi+uKgwSEQCO
7sCABL3KXTNBjBRFWtEKAylCn0DIYzCCao9dT3UaLpfFFYOLKIFZgYwnx/89ry3kytrnpa0dgqrs/dul

Wnu3/pOIGrDJasHu9STeWSJs656A2vFkjPzPBF59+Wgwq8R+m4/TJXO6T2d8Hk8MlQWz7K0AynaWZ2qW

higjxIt5y3axjBzFnDiuRBP5KC8VU/HzxOC2UHeKRL
YTGSncXWoSPVk9vRKGs4/3yg+x5IIDTuiiYvd8tc+RhFurYPQPVZCJD1uQ+C5Kv8U3laClx9PzKCmFgK

33qZhk954D5krNj5ggxe71WL1HXJ88Y6LmYh9wyVoH//ZIW7yCh0+A3+pgg5P+b/ju9nvgR/BGIj1Ell

MOVI0fD+8J5UF0Npv37jxHWtzDOYhdY+Bpl2yMCf2k
SyhV5g9Bxcpddc2q2wNSVi6YwQpe3bFO2oEDLyumH04yjp2cyjkBF53qEIO8x+Wl+iKoEfO33LEo4WTd

9qvZnX3h22l21vzfMgtoetsKLyK4Mtop/pj+qfroTG1586zTTeLeOs+LzNYcgnDedJTma3HEqH7orfnF

UsaTS29IiMDmuxYpxk29njM/8JUB1fN7Wq/+IEHbYd
4HEfnRybeootpghFGuqu9xyQEzKp+Al0e7jfFo35/tGzBiEqXhDiq9XmP23qqLw1PxmgFkdldUy9kxjj

zb59Mq0kozUTmrZA+/pTMqFuMu0qzZjyttnMrofMqFkZa+viSM2rFGR0gKr8xBefzjo8OA658jt8iRfr

C85xN5v9KYza3lzx5h3QHvvjzFzv+B0IxoickPP3h3
6pelGj8+I00YTkRI26vpc6fNarVdLWxBLCBrlHiabgKeO37PwEk51STE4k8w1Rl7tJi/TTl+2kWNl9/k

CsptDjGlElrAyxOwyxBEPim0m+J2H4r2x8yQKW4QEo4jbYmlWKlsYF80O63/TDTcxFDVEbzTrJoX00As

r3WH5GDMtGcww5RewlZwRqa5f4x5iWcIjXB3AQ+Yuy
NKY8m2AtUc7My/8bg1Th2lOkfbd18SJwcDA9sgtHIMv5ZaXkEOPmG7UjnbLqadZsonnxXL1DCf4Qf7YD

Rbi68Ock6t0czcgqzaGKA/E1vg8t4Z1J9ASQTwGhJTAyiIfE6tG4aOHTKvX44dyK+fzNyg7aA1v/MwGL

HLOFK5Hrmgh3+zg//NLYQ01iPGwa1nurBF+6XiTOXV
k26DEid3mv2PtiW/QmtQY4LHWjJ/gCIp3p1JyZU1oO/YAeXpb27gw4kaC3XuRnYeuCV5o4gSvwnA/feS

vdU+2rfVWgn87M5/FY9r/ufLCo91GFARvkNTnvekFBl+qdnS3aO0eVhUhC8h2FWqOWowR+Oxqymm35Dn

TXR0ZTTu0/UkeaRriH4LjAHyprUcz3MHgQzOgOA3gH
4vXhQh8f104U8b01Tmwa8qIpK5tY65daTY1UHkkmUrMVPs6e8IwHlhKs6ih3t1YY1uvqgh1Gyt92GLE/

tms/pPX1yDFYkMgEf/6D2fF/zvnoVOxdDp471T8lbbbQYmIjVflkX1nyNApTIP32XkM72ok87Yoqj9am

l7ym28ZUHzqHTGD464dq7HwA3vgY2brlvCao5ntVcx
O+55wcuWbYxIUjQ0KDqWdhr8s/FNw3J7mowsZirOmDRc3tAzdqjVDPIelyMPmSuMUTbgCAIoWrCHV3Aa

8sdDrQcxU4JOdLlyiLPVRBMYiMyzc5MQ+13gUW2rW5DseHV+ni0pVmsPZdw34/m4NNHBWo8EfyjwVrNo

oZrQd8kC+AoGEHKz6eRPUdWZJmp4u8FORr9VgwVA1q
IA5r7Tnc1hMRX7S/ZFLueBwR9wvie/wclPlA1vfiJ7Wvz/z55kY9r56g5K7G0k1ujtFasIDwvep1RnbA

lCVic0By1esaLtZ1Gbx1u1lz2v51AkZkfdQdpYazPJFT8/Ugdyzs5g6nEIQzIXTynXV+e8ManDvHjGMh

/QPzRzIdErCyki0gpiahAiZ5s7VkZznVXvnLsxTyE3
ZF1XY3U8/d3rMv1XsCpafJ2udGYIwbsK49G5mpuVLRlBffJmEouQk/p9AQ1KkEgE997MLjk7oWP/d4Vy

XajVpk4qFtNZrYzO7UA8hzhiinvTAM4ZWhb1VR2PFD0gWg5tXaD85PAP49inFJwe3iwXhwinbDofS09P

uDYxc9+xaIgchEsomfNkM7nGYNyKQM8X/5qunjfcv7
VwbhYyQf2dN314RLtWZpUAOxMCI5F0bowSE1LvbDYjqydNVusfduXQRn70cPurgFSibxhj2llVzHEmHH

gRtORl4HY95tBY00LHSzot6iT6j96Pp541HABdRjHNaoulAT1A4s6vhge3VaXfH2EV9QyTAOoYaXPfHK

AXiBNKzLlgXUQOZWAFdqRORBybKU3tONQkKzsC1CrX
JVfiYoExCJSR9+PHlBCggQc3M6zVvOvWYSg54nDsShxSACe3lECX3zWD7jQDOiaqlQK4D9y503z4vS5g

w/ZzdQgOd0xROfZZghglaPDTAJ6r4kITJapiCqbTVs2pe13T7zZbh8MfXsn4ZrOQyUGvO8hhNh2JxqYO

/L4XSG7QJw2dmMitBGCwV4FcyENk34BfZcgFusiQf4
Ngf3k7p5o2qkc7zRYDi+wReXQ6kBxifZFALPjN2hoMZIM+dz7SvvWp570Ud1VNRdG+hyJEjH0BlpjNWA

fSnGoNzzXgYA8iyn8uE3vj7WKf4erDp9veqVecrHtq8Po0PaL2yt85/xPJq/yqM2JoH/AR1HI9TdARTB

o9iB+XIEj031jZphCULUNkogj9sRqwhnYgeKv+JaQh
YfPe95/VGmkYh2bdcVmndaittIFFZRbGMqVN00WR6r0VmtVjSnZkAw3VT1zVGTTJbMH4CnIeYR68jwXj

Ufe6pSzNUh48rbb0LAYupSrqxg/wAve/8IX55f0IvbFujFm2knD8RfIaDQBQmskDykAbDPKD62tZa2r0

DvH+NbjPaicr86i/5c8Rl4S2BtF0hH6NZ3gJr30+a4
+oeVO8/HBNzlsm8Vv7PsvTclPGNNlwBiEyl1TRjNNZa7t306QijikYA0L+Mq0zhbpTA331M96+IqLZ/G

BREGuttzKR0ADW28ezvBOQaN7q4S3fsyNOeXS5FY+pUtcfzmbnmVoUgaYqQsRfDkzfzEXs+Zo9s/wO/X

u3+8xTPEOtyrjuMIUkcfV7/W9vIWDPkRb5O/3jvQWL
VnjYM2Owc9XuTB7pGr3rHsoXla8DMMRwr3PGFW8M32Fo46hBOhsP/Vhb8jQopYL6nou0mU5hcARyschy

A2lZYmxeHTjHgnngwCGUzf1j6t9lVb4WRfLPTBSgyRp3FaZwdpzZCbJBzntmlTcaoxydzMO+VIC0JjD/

SadoCZ+zO8oPP7W/yooBFywyKs7RTz/y1YLgjLbtbo
JDmoZMx+g6yRclzQGAZ8PCxWTe3KZYjkT67VAJgwc8PcocnbpGyxQ6m2i/rnOt2LE+Z8aLyArE+tE+j4

Wgbp0yXTxi8Wt46QBqbxRbx3M5GHeFW7AEAY7DL0uZ3VQU8Df6k58OJxk+Eu+WLrPePiEHDlDCqQk7xg

Z+LdUebYX63vw443G/VHj03f+uTgGotVd0T4CP55Cr
xn0KetDOzAce/61HOLtYZLdCQDXvoPgGsTupFp+csMcp5KfG1zDw+OTzf22vCCKJnj3aZO6zi2EITWMR

6gzW/2bagZFUM0ZyOhQ6e4mUwk5ErYBt+qQ1w76k+MwtRATkXyCPmNGy/H2ENn9PuQgio8IQ99zeIcb9

UDPTctkNtxq0aJpTRrNDTadvdVIWRkCMmBX80jUVTb
J3PC7yuvuF1St5Yly0LRhtvHD5xzMXMp9QYicL/H7ZWurzg4fTzv46kIQS5E6uuO6ce6aS6R+9idbOda

hWaPYJuYiLPd2e1Gaqkdea2Kuok5cne7FjO0KA+owFUZwp2Lgknl3ljU253FAHYRdx2gkhixalHkEsVk

L888YehEUsPC0anbXG/ZeWvYMt6mglmvp6R6yUSlt4
bqD0iQrBVC+sUue7ZWqgybzWCD0fBpCVfj5Iy4RqSReabxjluGKtwTQSyY2t41W21KKw6+wp6ma1KfLE

v/BApktLYLD1fr9285k0eDsOXRxF6Y80fBx0D8wz/55D25wThu1v6sQBl1wPkWoNMIVbB7QS8eyR0W4s

bA6YwJ34fg8GP/bsujMeo+BdogrRigbINkg3drLlOG
KTomAauRA6i/N7YXJB7zW/w7tT49wkqGHuq+gJ9PCVcwP3F4powda00QLdD/ijnyw6ohQd5L77X7mwXB

9x8FbZSulheZ17srt+MYpHYOrx8dcS+RO/JIKZbvkexLVJ+DTnjXFIda7Gmbqida3vCclDzM2pP1mypu

k2O4Gc802br3FM3ligMtasqixzbpJl9IMHHvXE1Y+e
4pQDOvLnBam/5fPMrk/+5i5bR+8Izw5Pa3cTdLKqvBYmVZQ+7TDUqAPfL0TkW1DruTWHE7lJg33jlgsI

4RbmEFIwRgP3UIh0B46KLeC9UXYSw0YgPeJq9FcvaqsA4REXE9vyQ8KeYnXTlGgCYzUXZWx2D8MY4jTz

ivDEcOsMfFbIjUD+9DbPX7CQHYDkwH1PFTmJP6tCYw
6fHcaeWtHFQXGx+ZzZ6BcFfmb/E+GvKPOOcwaleR9MF9pGbt6AaitdoO95QCQnPwd2Cd90dPbiilGY/G

6s3xKigSJ/i/5TkC9Qh+bNyockw4lcDtCfz/nTq83GOy2xcyuqUzNi8Wv4tZkZMPtYwKRfbKLh8OFdjN

8MwdHA5DwfMp94RIe5xXCBa/SEXxapnL/ZpYJk467m
Ntn2+Odu+ng1fscVzvRw/wW8ydJkr2s48VevEu0e6rA6I4aAIUmWFS/PlMeHRVhKOOE9wUo8t7YOie1e

zCT8GpRcltve8D36yvolobDkkM7mSRcf43gm767APDNFkn7oxhoHknxLaL3AoOaXiSJlkPHWK56qGaVP

k37IH3TnRVVFl7SpUdyk5JXxG9WPkeTsjJzZ+HyL0O
ZT0HbrnfaCDmEC0eAMEQS/W1L8Uc4cPLTYBkx8KKk4PZxaBL04d2jtiTKVxQqq+1qSt4mtp0vySz5DKx

UQDSQgdXj0tU3J7s30Xwcanf4t+v8uK69eq3kBqTnKmwIDPeoevUDzYa+4zBfHqa7ml8Ya4TwiYYwajs

IrJGBAln2el9oYJnDSXoXzt1R9i1v3N8xHNLbG/djR
n1cCZtanz8XyMbsbz1dBaloHd66x00cycdnZjVxQ2AY6/MHf5URUJ3g0Q0P6eKABX109w2uU+Pe/7FYJ

0u7GNkyiT18HlM0ebmr5HOZM30SEuNw9aLHF6d16JMh5Q0kMPNOq8sh2c7Q0LFtw7JfZ3P2GlDiR1DY0

FzpFNYFXoqRZo/r25VAecJgV3NgOUOQfJRlxZUkf3T
yEKg4TojEoynQ088vkuynjpcjigGJoJSPmUSJGCXrBsPiH+z/edTghTbCKjK7dO8pqXx9q61EnRUaYbu

vbro35oOAhTveaTVr2zWUzQwaW9Ubph+NtkujjN3RAAEjjWEMqjEKoOgB9GmcX+4aRJTlcZucaI72Xyb

3qRNzS+58jklye/jQX0Xn3t1efAga1fJezts7zcT1n
9iaQ59z8vKqDbgKtq15H1MURzqmQPoQFyZhVO9wkYhqfC0WaklBO5I1PxEkYWuGVTAeO37nRZOy/Ii/+

gN7hlGo/FbQwaTRCnuFuvvthKD2ZWJ8PWudweIFsUIVgRwNNsO/QPIBw1z3Ari+a2zeEOWpZyscsEp3X

39LYHzzRfYbqkl90iSnZbB9TbAbTm3SNIbJF9u0ILv
QezcWm9LOuYGKp2MmiyIESS/b7SSXtjI8wiq8MmbSP7S83iU4TzLUKiMVtDR0gTZ8HEAjzzsL94gT/zO

XTvvy53mpCiUDgsi8SoOHoFXvaGkyLtB8xEp880gHcLswBl6UQzub2NfriccwtYPWOrcUh/fzGntT+9f

aSdPreWFW0RD0QwIDX8mAw3ebMMN21nr0mmf8YVD3p
0RCCpGbIYhUrn78tQyPN/3wf0e1wO3dIjzes9KuFzqAMeHn6BvApKHaoLmQqIY3EqRaCCaty3XWgJ+NX

6KUGitzCdZXoHcmla7yqhEL8HD2qY9PzUNsUC5RWGOEXoHtg72oQQpzKyvNhnoYfNEH66Pra1eKjqPTy

97at4I09nOSQEZe1LroJc+kxzbY2OAXYksfEBJvyrV
XNqKNuv3qcQiNkDHBD2deS991QIl3Wf9SWQh42VbBIO0ajDa/7juF2/UoNh0cDNLp1I7pR21MR0oYT7d

fCkNUCMRnEHmJKgGw6GhrH7BHu4+aETQOsU5F/NwxhFZWAUS2kb/NGrn9RD+AaPusoyc/PQNIpE4jIjH

g1Vc+pDXX3KLFhwN8KKlyWVH8UhnbXGIhdr1zOvuNs
cABi6Ecr4Z36ja5LT17AsQOqCW04+KwW7fV6Bh5qB39GvuhhGaKfqL0hYFHiPkaGcWrrx38g7DZue+tY

RxUIX83BicVFbkn8tvUZ++hpt1lT75xKLwPfMydXMMDywKtpp66FAw/7KnU/ezaH1MxKACihjLOJEbqz

9e9agHOX11IVmnuWqumvmkqyBM119JgKHLpc3GuDwt
sLtVK5JfEfDHBvwaR3hmEMBcTuwW5LDVccDYguDH9Z9bxldZ+WXWq7WP4CuoP9WeOOGwC6Me1g6FqJ6C

/Plo8fqMqu93TEgMMH/QvLjHvHxrn9mSDQqI3m0gDoKa865OG7YyYG3Pm+W2pNXprAp6OirKPmhiGMCL

R0XIfokPahQhWPk65Z5whBLxriuD5kFHk1PgcIhuWv
zbe3Dp4VhdPrY6aQUIshGMiHyHXYasfjiZtJ/ZX3PRFUlAtG4psvyxEQetpulvBGJhRIc3tOpjjQyOVq

VYsDuOoNO4xNTg3LmWEADIRyU0hH7CKfh7cOBjXWjDEaCyJ5367QMf8TaOgh81sfgQHEGkg5a9pRFfV9

J9nKoUT5htv/Dl1fxCOak1yVwWGQNUTF/7WZ3SsbNA
tRlqgHk7sKvurrn6K03meeGMOnlRt31EPccZWtaXkQA598C568/KYiJKkRL0ZkQK23LKb07rJic7FksB

l94MkAz+EuVebEuGj8z0cLIW7w6hDWaw8ZlqaNAWHh6nN+ySUAS+EkEDA7S8qSg1vNuNBIty9Zn5BAYL

Uh4NVqtihC3huJm4Um0o7BJOvkJEO+jqCduRJeWTAv
smx4B2gnj+Q9gVMXXk0Jw1EdVAozEp2WEXhePHY4XFeKxS/3uy1n7xeahy6oEPu/lINdVNQHbxxMREV5

IH9E6vWELEL54uy9rQuPv/mMUy8OwvaHKoTxbbqzoTW+P25AW6AdSzjW2xzjqIAO1WcG+OOSFV5z1JU9

okI2a0ng6o2TWC8dEvojinBfMPUEm4lHsmRCtcC4KT
wpHuM9g8a9u1pIoefgoHTek5WseHLzwm5pwfXNhJVzMvnz7ThBNy6FQLrtwvzQRy03RVXKT+jcqEm3SA

qrCmnZmCUZHwmMIhhOL9K8/a2wZGIl1wCN6QgNeDoJETk87Dm2CjKMtmTN5Aa2ZAkWG6W9MLRc3i9o0p

lmU3baqjfcTe+qJqlIYrLgbjRNphggIjtgYClEOxvl
wjBXj8pmpgp+a+iX9/QLB3bQ1adwRHqhFoZXpv1u5PT0jt6F9tIHCEFVp+ci1q9W7o4s7/TwikD74mr4

DEtOT6k77u88u/dGgksJL98WbjP2MrAxVIn+dG6Zx8ypZ95mWxmnqR0zROthA9Ld/3jwUNDREIoeNuHQ

ZE2DzQ7PyBxq2yc1Mmsh8tzQaCDzPlnPxW8hxA/oDj
gxdcbd2AXB9otmAA8Pn91PWv1d3KkMCKFfXeQqTbGDOFOAKxdXdPNKi/QXv2xp4u0ZbNOiAjFIBBXmLI

wuBptF17s9AHz/9FEgsoKE65ACV4PFHenMDfwYFMuZdJhsRkKQaqYG2l3lU5QPtHqXG5OzfTtw4KN3A/

cGA7ZO7RLJ8+hBVwV0vpnlsMIQuPEt9MVNfG+qTpfw
7vRodbKR4pfsd5nI2LJLy4YADOs/Gj/hkzCGEAywLGObQ3HhdsxHP8yBgjUrPOimUUhtuSx6ZBIpHv0b

GpGE25y9en7L0qtr5Qa8M2xavhJje2L29Yc/MJYz3N066hSCVLmm+eZTunlFHwNOa/iwVoeiUxgO2f2B

sf4tIFxt+sKCC2nxOa/TIoJHQL0vQzKlHbIxUxSUL3
B1b2BTBbJtmCQ7DBm533gHQu5BQZpyJ5c2BOAy1LeKYbe04Gu+w+2qVNl76VW9nAbDuyELFRHzR9t38J

c7s3JnaNnIPzBbDPI5zsxEulpGMiE7PRBJVoKONwwKtzV+8d/fxtImOM2t87k8yy6FYBtH0mXRqt1n3R

TiAS8rDZR4XEI7hGDxa43X0LN0rdy1d2376CGuwdpU
b1xqaXUvU5DE1cJSX0Vj0gNc8u5CyCrz4pq3OHqoJ0YsQeUTVszhaQ3h7jL0ZjxDDLFc8CPgy0+SoZSu

lW5b9+AsRqPSuGK438dPp0c17cXtmb+zvEScEde2zaX6YMhqGR35lHzo14MIbrIu6NV4Vx6k3pbajiDx

+ULTEBKJqhpQ77Jgg6NmCf6ZjLTBOnekG9Y0Tu5p9y
K2kof4sNweRRQcMNm3av/m38sGbVX9ATlgGM74C3Pbo04thdHBbebhQ6EEZHriLmu56wO+6LSvzHxO/+

np7WStA49ibn46TaBX8aizdiV4Zyb9Y4pg33K/va9iFZrLD15jBa+DiGOP8/sEor/gFGX1w/tMo+hIAG

Tng22+1cP5ws00aHcgduOVaUp0z8jLxjcz74Q5wtXR
+N6fIylPGlonY58nR84B/Asw+QD5rIeU1Gm9Xfs8NZ7pFYL/fU26oEwsVfa2p/8tUL2RcY4EywpvfYAt

+ZHd6Z+ow0z88VKZEaK0elgaD016KkqBCWevq5PDsLKMR7l/CRFtepzuE9lb9dRIrIWDgxHAI7p64S42

gJaZo7Id3VnHB2zwkzoffcKHGVBArWmc1b/BjfDptL
ZH6pyigg77ttcJ3NA73duXZnZew2BUbGgbs8fbkS2s1z4n7SNq2Drh8Te/4FRw2gmaYhNK/or7AyI4Rk

f3BCf9rDHpcjqyHsEIhr26NrMvSmydVoMVgqxOweao1DhZ24sif9xXqAOp9Yx1JCG/7mThDuCLaeuQii

MzNA+5qq8YdufUPNRTKbz1d+aIztIUL2ItrSi4s25l
3bF7IOm4/jMD0wsiKYBX5uv4DnmlW+9tjZ+78JBlkt/x4964H0KJBHgkHaGkBiiVjlaXi/I4u/MuEl91

9JVldlzBx/jSt6v3xG50RG8YxUb2d5wEm5u2WaUibApKL+Nfa6mtUGzitLfGN5hcAp9WeZt2BpFTyub1

7ERKHUWJWhgXdH+Idkkb8BtlCu1WP3BgWoApJhIfPw
4rqRFh8pqhAGRZbc2svnRrdGyGZgMDS1zB+5sFReAZ0+D5ImSPEon4GJgPHUw8qc6wyE7DUu+bHxVnSx

ozvU5l6Oe9goB5NR11okeoQIKSn2r2736K8V9yz9/5/BxuOYwkIRm8gSDNk9O/exVZx3c8LOMO3ZCg6+

Yu3UvLPUVBB+8Ndzx9kM/GifhVm4rCcc+yn+6dkxvF
eKeJYNm93kbmQTNTQZE1KOZkHjYZTL/GB5Zhom8y6LL1VvFYpY+aT8svKE/aOazGIqZkhGsV0toast3I

H2QqzUW3Iukd3nDPFdinKRnGNb8vrw+EIQVm2Shsqvdyx6zfiBbGy93yo+B1qinf1Sas4sU1L7xGfzSz

gw4xDdMUQMD5k6jA5HPXY+b1dQc2w6i2liLbq7EiTn
uMqq+bqCLzUXShit4ipWvRIgboDbfKKTF0I4nYZZM/x0BDkoAR0qcHaktDaiy8wxca2taYqfxeqTLj82

A8Q7jbZhSYvBsvuJXlfj09o45411JbVvm0MRLmfmfmkwX2SQ/ZgzgNpSIDfgQIs78MJQtyYCjLpwe/FG

QETQ58vkShKOlW7J+Zt1GyZFcfgX2EL9OnHSjtAVRo
pTlvPm8LiwjcxuPUAsWXf+A1S/R+eXGqLN++i+ftYQUiqmN4uQxMlPFqw8MRKG/dtkbSMofcVNWcyJcl

S6AxGraPSLm/jbqM/yp3Jktgu8PdnVH6v5J24l0KzYdpKG/ySPl8k3r0tGGo13aEKaA5BHqVjKzBdqNt

nx6TCYKW4VeDyA0FPZZvYOmqDuFLC37/Nywqkn2Ez7
MaIudzOuNASPgxZFgpxoOs/Dz4vlhFE5iKdeQc9cNEYxqMr3n20wJ53OvRQASDELs34S8XwpVdqj+[facility]

YeYTnLlr2jN4M1eryRtz2ccX89seZcYJh8vdmDubBDlGjy+WUGGSyXn3FfcoLMkOnCB1uatQjGra4z/v

VcOxOVkaUOQvMs/PfZkTcqh5t7HfY5g/mlWpnt6L1T
iiL+sUVwTIPxucj7QnYUd5HnlgDKRpltgA0i6vR0lfccBktdO9e86L5Zmc7NI2ZaqqrlB/+EfYOhJdX3

0X0efr1Ra/uoa0coA9siexBzjmwkvzHDaLQM8xsj4hoyK2tn5QfHtSWa0Acf0WU8OsVrFW5X38Ireb+N

pmtv0sQDxQmCJr382LgDpnpNEmwoxdeGF3prTY23gx
F+G4Fvxb5EXqVSp6aB75B2kI/m/hte2PCr1BeZkzc6mHf+zlKOXcameL7bCoJFftQBWucGyzWZxu6PEl

KTpM7lzQnhR4QXONXfG0qfVhZADJob5iW0mrAsy9Flr5aJ17EylJmgDLSQtvMHW7J7FJNHeEhmU1OTXF

99hBZm6dsUgBfjgeuwz2RivgnSQslZamV6bEW7pPi8
PR0DaaaSHqsfnAe//76S9JEv9UAJh5kezZagbpwfPW1kVMkRHdXC305yHuLwNZJ8OZYpm/VlevbKz5Vo

xqM9HJD8rh9GHkWuRsWkukGDs0QJw8TwBeyJQ3EGlrUO2nypQwq3quAANmdQujkKF/oxTCWJgwcO4MwR

jtqTVdQx/COQLFl3qn7tA4wYKt0TDiB9qV4z6Ag30L
nwi4svCrL/nPbmunEwPsgokp5kyGXvNO9puvv+PBluRy1EcbaN/uV+FYtOQe0wykLTCG8jvaPhVCm+gz

p2SsyMDyeASIKivjZDmXl4mSH6EnHxOc1fBUOuAXnjgeLlIVWquHga0KbFC4e93TtEw8gOsWi/jfUhdx

F2xxIK6wMK6giGPN0r6GdzKHNUEkj0B9+TJGVgBQjS
kjItE3+pq8TFIdScgrucNp47ZnB07y7EGc3BkSRK81K9jc4hYJDVFj0McWhIh4GDrLt10fmucx6sheob

ihDjmAXyZLC+vEN4yAMUAfrLAjGbJv988/noBhQE1QQDuE13yNlHBfAVrMXMHFgUl81eMYnrsuhGKvD9

Qaa1b6n45yaAZItz+XwCokx0dkLppuKB8RuDmgoP8K
EFr1UCMg04v5hEXGb3AUT7t3U43bd7/u0npAL1+wYKCncUpmzNzL/K4PrbJFKZ+Wc1No/erOwEB7jsFg

FNzfGaIMzZbNQkBQVho61nr2k2/WR9mrDPo/REcDH4xIxOOiB8+5VoUCcA8kb2296o0q6E5pVzhppHBE

hH/d+Qmm7xZ0F6JUN/2gZM9o0RrvbBDw0GQqgRekn/
dqOerRG+tfKioR7fJGPztDSQbGExBIjOYzjQRo09pzYJ7RsLVMkVJ5+Y24iUxzi4sJrw8Ugl2qmPS61u

CILqSxoBIHXFOdoqjMfjP2+VJL4rPJ3CRrJIzaQ9whYtYDAjP5yswJq7srivIADYzKWCdNDtrRjZPXTX

4VQuqJvekRnIlHOtd4srruiRQisG4VagbONmmj1pge
q8eoSXAXXn6WF4H82aVa1MSPc8hbrrtE2sLiB4vnIuOz4SVSgCFe+3btEidqAzo0joQEfRQ9Rdyw0BF8

6aHIzceGAxITk+LSueICcN2Y92JW0r/+TMiUT4uOSC5GzzPcEHyIz+Q+i2j79uhN/sc/8LjGLjqkrUYC

OVGKS+qLQ2rLAtf/TH0FuKP/u4rAQXCbdiqbHHkHSc
87zWNWe6pdf3jDTSQDciPCBh/ATOubYvKdbk6VyCHk3M19SGCFkMYDJbRKve61brHzVz8Opw8hwVtVlg

vHUXx7OeNsMs808TYx117sHe0VBkdkbJnOTuyq5VDpHl+WzPAY9hrD6TebmSbf4YKZ4RGXljauxowsiz

f3t8LPz4Y5nIZ47Js3Bv5kMakMwR3rR+jOdLBK6Sqk
V6xzVdAkB3c1HKJ5oeFiusQGmH5qLhMmLxFPdT4kA1iKAu9QJBuZ38lrPM5B3JwKAlgylbt+35fhRzvu

D2VqNwNDA9+lLbgPiw2eLSukxi62IxLaV6/HeimC7RMgWsvPs0EvZ3WoRExiXnH8jRJRS4fYUhr8aKjz

hUVN6zBJ1viYGOxTJ3ouu376ZISnVBDOXTAfJoyhNr
uIsty9ARf2CPeTt3WFws+E43Phkfu23ttBOMQrxnFimP64IR8wMiIp8BVmZiDnqF+QTa59x5H5qZj6Hi

1/I9VIUCzXyRkdCMAUa9XjZ9hyqxRPx0wBhgX8Bn9d4eZegxbIBCwJP6waBqV6WjVpJw/eTnuAHQLobD

yycaNfWypeWALaPOdfFPlA7pBm3bcZqVPhh8D2tyxd
HsrcH3OUuioAicsX/qiTig/rc9QQ//gv5LASF+Yxz1ni4HF164XyOLh4coJTF7bwpnorpWRiJC+oPXrd

LHA0t3Whx2p5Q7fXpM27IqiFSYG92LGZbtl4jBuvfzeqdD0Ulzhp7XKX13Rt6Dp60oqvGQzKfOehFBWx

J1t06ZJddLfgRsAGtbT3eX7KGxYiD5iEwEnM+gitmF
idO58LWatjbffgrVE7z8yLwE8NkSLazdcJoGYB+O6zeb2uKTXSO3erssyNNc/qvvEa0X6XKLv/pU+rp0

2hIFpURf5rNNEiWmi51m+9ic+Iv/0DExdonyRJoXyslsK2liHWDSu9+MsqtVqHP4XFcK+WCKwvHvNCgD

/PVcUILIt+dh5iC43hxDtvImoz8PdKs5017tnGVrMW
Z6Y6RN8WrqcixLETIkR4izUW7+/vdRSvGWKQGcGnTGgo5b8S1Uqg4O8f0STHQUPSIncW/VHR8WpVV7J5

UtFTwvMwyubGpshxoUHPpcK/OYmJv7i9D4WN+T/AkzvtVZbpytYL5LRaEvvvfNX8pN8mhLP81aVrmEn5

G51Y7obje/799yP6EHRWazhy6O4WJOQMMd4Oc4VhlF
qlRiC6/Hf3hC4CStBGbqTIZoAdmrfI6aSNHTUJ8nbtVgb+rIbK+FVC+2igiVXIM+lnLKEXNe/vTKbpVM

6FC3rnYHjAspBZ+7UWpxa5n+CaTatG4TDcly6LTE1ARxzPu/3fHYMAr5sLyeLzqij61SfhW/k8ycEN22

Pbs5+3SRyeOcB8qiDBVV0makcgWR3NWGN9sOmy0hCg
kCsF37mlC36wHBSMb0JEHW1+HGUpOa0MNNFwl9/bGMswLaPHF/wwEAyZfQtv5D3UixWvyG+dynIa2Uc3

Qqc0JhnSUsVXGraMzPHi6p87DJRQFflWi3K5ylgqgP7in+VH0k0DDHtNZkxZr407FYma3xiy8UJcrbt3

qdhiKiOnrVpK53bc9AziCVi5o+niSfG/uIcjUsFyjC
jNvg4uZLH+FIlJm4HCoLkZ5Q3phEIMRMgd1wDxyYKBUtTTQ9JjE5kXhCBh0ugdieNTQaqUUtGJSaKYGQ

MuJRvSP6gRBemMZTNbQ3bRwxdhfLg9eZfoRZ7oh1dDZpCxe0n0lF9dQPjGjh0pDN12rHWMVbWFRbSOPa

P4BkN+Fb+iDeRWoky9khr3odM6FvK6OpGd/qrlGtbF
4nDC7kVH466U+F/k/gd5rF9I0WqV4jb+c+DY8FLbN7KcI4Z5rblBwAGLQxVN33rC+EDxsXwWcX8wAyFx

JQp+bt0n7rjFQzpGOR98IL/TDWPQQ0hoOKh1YjKhUERVoewov6KgBrabftoccoQYo+jzJQBLspPnrl8h

9VYzBk2jsey5bDBQow41Ompen44rzqzfTcjrvvbihu
quxbytrGkvLf4UhvA3/Ixw+eJ4M3OdyVSy7Y1fiTgCwzIvp8LRuBxx8nOmcsZXn0/7YxqjZF3QcF63Ps

qWa++9jhLxW/4gy8xaQdYCkJg3iyjmoTwVj7hoYFV/zMcBi6mDSpG/pvQbVraaQPu4EJrVXJG9re/VOQ

dFBi0LHoAFssUrdyOvCoksdpo3ICT4308BgU21d/2L
R7umFZY0Y36+XwqxW1U10GNmm2py6KKP1qV4tvWwm/e63kTS296VrzHfkCNmFxLJrWNDIdrKqKklltog

ePNS6WFIwBcyfQQnZc+TwYIt78c5lyNM3s+VmCR6R9hn+px6QwuQ7YNNhA/cNhu2FqX+xlbUZyAThJBM

+mVj2nQjujty9gF0yvq6rX4qk0dr6+GbBtiw7XTdeD
JC1nir/06kFqAebpnuhIwota9BuFfFs1kMzPdkCO2YbmhdqHI8ZcyQWJ/Ojck2Y5Jd8e+tFChjYV03kr

qvvjPNrKmH5fNN1zZ1+Q5dERb2fD66KBhg1SYBVY0FsewfNNotpJDA9BPflXjvrazV1L6uN6E2K+xQ3R

DArLdYdPu5Sya2nMSCtYn1Nk57LstmW/orzwoHuEkz
nSkfyC3FJ9wIKNy1bpJeWDXrKPzZ1u0O7fX8Fpt/SQjlD49hmm99qdps0Z8QVx20wAi8fsBUzfm7t5B5

v0DihE3zEP8jLd/7iK2bjncU8j2cjOl+h0tnNWBfOCUJen+/qRdafEidIa5+65U84UINutrhnPrm5MQi

QcxdVukcACkeOP8ty7JftrOdHOWDF84stY+tAoi7nQ
pHPPZlOfoLfdoqf/KJj6wybtyiLg/iowURU0f2lqJhEe9M/ltIhAm6GcrtqH7b9PV3mq4MoJNlUNHbdp

OLSZ4p/YbHKP2Z4ZTcQNPod6Q8XFUJeGNEQrAzUsdtyIjMls/blhHDHykaaBxzMbffLlch8i4z3Re6Jw

mAWP6UeznNXuR3j1POS4YSxWKUizAOmnV5JThr35f8
au5yKJPqeSEHTOgoo2Iy19x11e4iYm3tiZiWSxtTL8BExlJnCSOYY1EdH8iLFXFmCNuKG/kYvXpwCvwc

hdv2QJUWw6jiAXdQcMSk9UWVGgWS4BI9scO4Dvj9ijnfgtNQiWZSHWruPdC7IL0qhvW+U0WqcoWoaMoF

8/G5ywbKN7qNq0WWITcxCpyFO9F7iPUPk7tsRQmFfp
7KwM40zsMTEdkib8n+8enSywIM3jDq41/yf7yxWxyXWVKB5YFRpcOY/N468LozJuodlzxy80LVmlblRi

Z+vdzDCtaX8dtCKauYZ0CrTJc8UC3mPgTcH25wk5ESmVtuf4M2xet/MtMg76eXc0TOdxuR5OkhUksLnT

opBg1RWtzMOW4i8IKl1wzaUzMPPsR7wUmNd9dQAzK+
Qh0ToKTcjIe75BKB6evX18sR03kLoVbnIh8IlSS4paAsyu4t9lddOqF9bNDAU7AOS548H12EKjdHQ08z

Wz1AAStxdSH6rtjdpEp3X1QWAYcvLQY9Rnk4LeK9r8HthacuP5vvQ0GH6G3o/yKRwZXZiBqO9+GNxQuo

TIxJ3ErvSl/innVfrQeeNvvCw/A8LSOK9m+3bJ5phx
oCYxObjlZp3GfhzTEKYVk3L7DPfR+53ye72aPb47PR/lH/sNHrJtW/gbaMl7PRcTypTQv6ZmkKUmXLXW

o57i3g77BwLdtsjL6ex2aBUiHEziPy5pNvdh3ubna0T6Isua3h3AiWJlP++gNrRslOD3T8+WUQ/V4ghc

TjyW3FH5SmVH0xqKjUT9rRiWBtaJVtV0xF4e/
Os54rWfiXgUOmRRrZB1RTj4I57OF5OYS8re5DtRUxontxZu+pJ1hKoAn+6F4Q2mHDsZtIOJMBAZKdRhJ

OPUBGjQGRDwSXd9y29JFn/DZWNuPyroeYeETNmzb3f9d17l5Kr3MWDRnR6ZoLw+eX5ghtqC29XcjNMSl

1PdqUxzWIhx9E4wx6vfbd8/Fm9UFAGKRpOZ4aom3dL
iZbbf8EyVumHk2F4wQJkdGXwD/pwMXIzYtz975C7dFG2kX9x85zzQI7+3cgrqxqbrOnBcuE+l1MWXLsf

odHlPxHk7IkA5RA1bjYFHD/Bqn/1ES/t6QK54ZZas8PFUxuYU1847Ci7cqEk+xNq2mjYiFt/cc2Llo9u

9S1tWpeYPBR8lze6iQ2uftBT7lizPkinSBMA6c6gdL
14si6XT3TiFc0PRWalsp2auy8W8wSVF5B7CA6BCkDHpRQmd0pqHLV7P/Imxjq73qb2NPEb3TpyabsN8t

G9Qq14hqDyK5uzo7CymDrx0ckeTc2GKgWiDmx9rPHBjUJjar8Gqk9F47+By+DadhMWuwJ6ABQQO4/s+/

V0ACIO56+HxAUbAsXllh852i/mvTYcW0BTqHn8cznd
65knGSC2Xt6YOzXTQag8PGlO+o5iY/YprzqVJP8GqxmIdvDLseTkqa8BnCRe9/rLUsbUwQ4gHLiziSoR

fZLWhnQ2+wFgskSzsK9nXf33l5mDiFpQWKSoe5edQY1oa/0+XzDzDc8/yf3lE3zv/TEBrGsK7tPpXAtI

7tau9ogE9JxY9siWIWGDjcM+pbjcRlftLk0rDhNNOa
9uspvK1Al1DByqaQpTEx5Z+B+vzzRIGE4UVj0i51CvHGlabCF11dEblWAos2ORggqqtSvBiMkMOiewXn

nKyzAzZzHuP0M3viec2GUIggy4AxeeW75L2H5FTZo2YJiGUubJGpHzcbsWtfjX/PDnOx3GMOtDze41aV

FHEAOdFXPgVzOHc32FIo7DVlciH07uuM20iRuRhLDr
FFCfndZma+BthNtlLE5d4hZzDfQ2KFsxWq8ED474ORYfnKTTdZcy68hwaev3Hw8l0uDWqgAikjOKM50d

F90i/Ayk6eEo3qz0erqlwy6qBXD3kFy0JP61DzYiccEZKn4DYI6s3uxThASkcKWwRoHceyGP47A/0tMT

W2QJSfGlB6/3x0LpmTktlBqkD8XBuHA2q25spoPf1m
u8JWvo3Jjy1UQrcEO3zpDFfKJTT1yVa+AfDGPXm/lfIUrlhNvlAGox/gnJ557iev5XlX/EQ4QqjHocsi

qvb7CliScKOEjm1+NrdexzZ8u+LssmhxeTg7HthqjQLH1/0/Fipb/kAsupa5PhHU7qa0cv9NE2qd685C

0s9GqyUD0dCZoXiA5knmtOJGOqVs+uMLwWIgNSVI3D
6rQYzn0k6t4mnQrmrs1hRCgenRkkTJR5PNmxwYRpkQ9ewPMCIduKKk05QCTrJU8Itx/VO7YDNhElqB60

WQUpy8h9l5IK7Iar5DetzHymozL9AT5K/Ab/tEJU0B0CkkVtEZjToVt0vdtu3MKQO/GS0cpg/zeb345k

/UqdrhjOGzer3ek4q4YOnNcofucr4+Hasy5sqM9lCg
/FcldSeR0Fy7j9L+SjjsHC8n484Rz3Lh7P2/KrAvU1JP67/KOZl9f+tPQ3KL/WNgvhy30gh6S0d49Mba

y/zEX0BsTqvFJhWA+u+xWKM5yqOTtmiTE9Vtfimp9wGbjumLpy+5V4gIf8+Xe/vXBPuyJexMpw4WFK/s

PH8uk2C0Dv0puG1Tde7VQfNNMw/hMtgVhvEuqL83tR
1294/Wfyfg3m2uYkodhIkXpy/MrzPV0TLmCYoxQXX14/1JkSTdt/sZIhfimpbRBL0Ito8nw854YSSTUf

cZd8+BT7v6qWCXnl2hTynWt78+q/3zTFRWlHTKbF8Eze8xwvnWJqGu4ZuMHlF2H08LGNx7rYekQ0je7i

A/jTNC+HALf2ZRJMJJ3WqWI7SRKKi8liF45nTfTZMA
VLP9Qa1KXFW4N/S1mTjrdP1Ua6FUjUcQ6ty8izm3rbI3WQ7Vlc/XhHoQ8t/XJFx0uuWFzb5lnjiIbd+q

axh19yT4/7le9Oy8mClV+F3cEI8LLVpRFC5gVQXkWYTQLlPGPCIzvyUorte0TcfKU2mmvT6YsnM3yTjL

suCFIOGlaKxpqc8Id2mG6idKfw7cj3u8rr28z2T0eU
QsNJXrnjFCsto3kiYT2naL+iDI3KiznopZrrwzNMNxWXwkdlR6jIA8wK1ZNWoLiZBBveYKjNi8oCqqpe

FlGXKeFz0m8Tn8pA6PL8uIpJmutCbO+uVjvTtegiQmY8GWp0dlgJAp8a/ONvBwDXRT8ptP+f/I0Z6t9h

vlJRKMtJ3XLn12LhzNHvRAoLYhbxtlIeK6bmTWx1Zk
wYZ0nxj5G72j6b9c9f2CJDubTWo4CpQCGWNxyoxnlDpfXzM6C0xtdfuzyTLx1f4/4LOsW/bsw4zfy7+A

DaIsDW2wy9qh8fGrHF4X79+t+yaE+Ei0b76hSsX8ocMUFyA3+DrJwW0BXdpV5k6IHc4BdwvLmdWs+kxY

/SjQn5FyGiPAq/7WiVAsb86EH7x2NSJVxCxRKVGsan
Tossx+EcWdJnedlev3YBYuFQmuafY4SZczFaiXD8E6GGtOxiYi4rRZeQ/ELIEL+kXb+z7ZbrNqIg27sPjd

aTGy0/mmKFB2TDRXIWbPrWpjMUPKt42TtncH1ckfUAPSFikV5k1wgg4S3fnQ3VrGE3I5prtqv8pDsVfS

iBOiFKhy1VtIl/UbqRnq3x92objbmtpjGh5H+gbDNo
D9dcbT+GfEqZwDr1XhUiSjHMfuQqp+SR0HfOneN5WjKJsYkUXUd//mgpSufwT8pt51mvH8/bJCRPt/xS

3XtKeHr2dLpvTpgRm2+sZnt8JPKLxwasQW5Hy+NLwRN8c9bcH9tWOm/7G3OCOMLvlIVm9XoPWeEE0E5n

f7oYgou9nKA9AexVffyZv2geB+pjAanJ/Czy6+9MXV
o38FipsNOV+7urzvK6Uvin7nOXQKc8jXJrbFbLTxcGuY9dbxaAKswNUhnJx1wB+t1Z2D8mJOcwoxxfJG

XPB9Cg6WrsB6iZ2QkNfXgZnHRwL+2O6fAprmFH9HB6Y/tpCOaw8tu09vjWF2t8oXPeQDg9RGp5Xdpis1

8aze1zG3byRpKdRQl60Ro416BjyH1Abz7JvQEq6Mv+
DQ1+I0Ta1UZePzkiCzFTwcIaejN3LKrPWLTwDcybQSZZsHIrgIw/jxelw6SEZUaSsbeNBQAqZfpo48aW

YFCdCrZ/TjUzFLkQZEyllN8fPAm6JDs9nn+/OJ3fBDctTBY7Hv2I/Y74ibv+1sp8i3NWuGq+Me55UhOu

1hAepuine4yQeianEih1UD/TSfZTBXus7/30wpxsCK
6bwoUMEyDjPxoro2R+n/GZY/2DXcxA5orhx2vWceMjbKlko5pTrwcXzlD8VrRdU7fQGU8Qbv2cZEIjP/

PHMDdrPPhDAt4CKEcs/ADLPyYv6bMpJfckpKgvtm/ppmhIkRepQs90GowYHI1VsR0aKp+m90fJ3udw3/

alvnZhF5eUXmo/3hELkHmAJbpqFFt8eI9FFwHTryG3
dMVOGPJbXzfTvOXckcc+1NGZZ8U4JRB0cCL8FZnnOiyYPo5eE9fy/9k+dR8VA7Zg1kJpu+6Q89oO2bMu

J5jP1t6t1Yj56Ju7s/Ls+ZWVlnd94Z+V8vb4UBdo6Ee9cDMSG6U9BNX6mtWWZfiI5Vx38a5Ytx/t0y7U

i7n8EBchOqewC0KjVABOO2pf7a+YG2oxenZXvHDx5+
vwBc6Ef8ISjTdYbJ+li9JYNnlZ/so8gdC3ne2kRLYIfL21/oAoSfhZ35lUlL0dbcGY0rhgzoeNsHEQ9e

rr8FZX1h5MH2rEqywQ/Qk+CD1Uc9E26TtmE3ttbK9VfW+Z3NuwhpeJ7gh2PprCaKBaTjjR4bKHN7Wl6Y

GNk1vuFyJHf8ye186Qq9GeldqyDsr7K4YvADgaS9Cc
7sbpXolTe0vm4SgvAma7/Yq/nBSgK4aw5SHNf93WPwkT55bUX5NZXNAIVlZCGmX6sPyAAZAlaL51LB94

LuBJ8ip+mPHIYukGhq2frzoyalHfOf9t6OL4KoIRUgWhtJq86BQbXM42F97u8zX/NSYU6qVte2xREB+Z

2yxewXeK0o0pZpOw3m90RuTEGb1EAcoV4y33wEtZDN
MI2kxfLXTgOEId1vW87gpkrNIauC0dPf4+MSr7gBz6pS84yPbcvCinVdJQI+hTwbqd3/YA3pyDKPOl7c

ofVNp1yhy8xL5iVKzG1Bh2XKcbRyDM0fFRraCUciOph4C3xGl53MGqsqMh0O4Kl6k0Elu+YUC9TvH+HG

e77cam/AJif3/U/DZ2kg94tNYZXT+rHVBB5QSBUm4+
+ElSj+aRCezTclyE1BZyrx01Kko3kp3328gg135X7XmoyLjmoJ4u219fHyCQgaMQXOZer/V5F4BP+vQo

k8900U6bO0HhflNShwPWL2cki4X/sM9rGepQC6KgN7LPiE4EaFGtci5SfjNRlaR+INQlmf3Q6qCelnor

Gng2VEKR6i2LxP1IJD972qiWmKv1YbXuLYH1vQvywV
RfR7+N4vJxYA7ON5rrEp/mm/MuRWp6uX5kZGAbEIUFZRq6mONNz6osTwD9LZ7gLqRPsOBhyASl8BCyUV

xY1C9KuheS0WAQB8F7MR3gAVKd/RDK76BBWVJD0RDYPg8yOJPGH9r7vzH/uw59zxEQQn0tUqs9UQvfgn

vnvlfCmnadLgRIawmCDyZsylM3cVCoOwPLKSSLxXCj
GZlG/ejtoWd4/IVXscyYbwMKyDYkwJKgofTJUfGiu7MPc+RNzW85JFe45dCYMdSNOx79F5L4UJRxS9QT

hb9KfQwWoiucATfEX9hzrWXlj/t9BrXS2IDmjyEVooghIhSHAShhMDPL1yt9+ELHIEsxGoSE9cxz2rWO

0uTmMdm08H2plA3T9cJZRJmt/bXZm1sn6Xr/cDcr5y
UhA2fIgQnUREvLsZp3XTmng5Qls8nNevi32uq2K8XlqIpiQpubv8Tsd739iFYgDFE8pGjTd7UERKD+gX

3fAyNgMe3G8I4/piFUCQ9KUJghwN9ad0dPFFCye/OD+9pAiw6WAimwLwJXpZf5hU+PHY3/rY0t+g0xjf

phrTRaYrS62RK0ZA/xX13eTz3ku0maLcNxYnX6EFh3
OVyyfH5NVle0RPKnYPxL6ZYrCklp2PBhwLs6ybalVwKlSDtAxDJ5xxl4O9APfJBvefnywgVTUD7FoZIX

jAy6MCInLuxpP7wUnzl5Eiqjs/HAv3a+kFarTH8lrp8k8Y9DmxXHrIl3PfEJ6MxTFW9sqqz/5ZfFcUfY

vfcc6myc1tUC6zRoLxmlns2KmRke4DGrBYRQhAXjAB
CChKX/BhcP14JZDDN2t2sd6TvmWA2teuxOu1njwR8rwnbadUKQtpXdF785jSXqs8/Lk515zbtOmqwfry

NqXyIGHj9UjEXzR5YJf9MnjhgWjdnBhYHFiIRkFvqCAa/iIv5veaUQe93fD2/0bPW8uOatQd7cYeCG/X

6oZ2RDstz0IOURkeupoW5jKAaWNjd/EKHOM5J65dLk
vMcsmKn1l0mk7xLBbKJZGE+OEwgTjbL2cS6L4DQK4okbll26P8isALs2mvsrrXjRyB2QV/PC9NzkcbZz

tYLGBkAC2rTW6NjNZfiVHDWsG/r9/wD4+bFL2ZPL3Vlg+LfCaJQJCp9TlyVY0fbgGQtBVpK+SacvQy7a

FitQmGF85wik5hYxuukyWQW2BXklPUXNspxwooghTP
3sIVwCZ3IBKCBTxNxtvtzl2iEd8QgwynIRxjrhcTod6gSiookY73GfJVFemR1m3kObos61eGX0O0uSsm

0A5TzvKIDa+xv2d3l6nO5j4H5oCcTVi5PbkS0IgFmyL4jO2GXLFVR6Ty9ORlCydyRRGjM4sQ/GYbESOz

xWii/OJ4ei9yqn9ROC0DQ5a4vj65k/Osqgvrz+dnNH
/Tq81H4eKfntlEk0VZEr6ijosXkWFLjCWgaPHkZRpFeW2bUEXfgYp1B++rZbp3e26LMr76DSne0lsN5O

QRKuNbuI5Cr3u3xxTKT5iHjaTKruvaK4mMPe4Uj09sybDaAIt/LHLrDpzi0C9r+t65BByZSjz3+QNrRY

sRiOQT29hEeldKoD5UyGnpQZD3UsPIWEo2mbTdrfSl
ydBCueUCSFAIf/247lfb96YK+cgh6pBJfKkcNSiNw2w7+49efncHmckrxwBEJFtGh26S0XlmzzVrThzY

+op/pFrzO2eqDkg56psA5g8ha+KUrxcDzwOJHPb61CLoEGEqT3u/J8+gLv56nO6D+dJp550w70wC5E/f

Ad4hwZUw+H5w5sSq8NmFo/GquLC2KBVGh0zYOqlKqL
VrdiiRsVvdGonl228ARECFupqGytr0yNTpXZ5g/yXKdUVbymHkFeqD25fXh6QPSWwUX+R8w72Ts18uAz

Nxv+O1IFQOnWBGzwTjwvigHhck98k0HxIZu7WPDHems7U372ph4J9fzWLIgbg7z95A/1CY6YBZNDSCGh

pPRKWfJtRE2g3obn4x4KR1Kz5KFwHFtINggEfNWJWS
EDWARD/XVwE5T58Exrh6Ttptq0I4Wb9RnnWxW0LDxspjFR/83/MgpfIyPbhRW5CSBR/Vy4BUAMUz2OtJh81K

6zbJ9wycr0rdq9ynDMduDLM1Q//bK4G7oylWe9zmU2rpBA7e5wfCcPK3spJztSGihq8drOTTnlfYM5qx

7c7XDW4OeHzvQt1HOsoJlYEGrkxW7Wvg0MlLfJEQ7v
+ONW1U+Iy2mnILlPhwtagNfvyjjRK7K3tRpTQL+rnQ6OFWztz9ANuonIUtLBzRfqpqZs8f9fnvYEsA3h

onIrFWrn0gnNuLGYT+q1kb9+4X68VuQojUn4jwees+++UzLE47uV5XOJLxGRRafJdtkxkUPNvP7CFtJ9

ODvo+LUHIfbkb3asoN4+nIjlzACkmH2wpXnxl2pRFr
KA5OYNVODnkqRQCgdI4ngnIDYk5co6awhZ4FS9x8FT6r3KWc9yXj6GXTYWEv3aBfQQVYecP6dXagx63F

mBInT4c/EBmuo41DmYZ1pkVK0ZHUvoQDgoilzPMwq9iRdOgHdqGiS/8u7g9hMbeJ2LnOPWZvP5R+1kvA

dUu95+1RX+d+RfKNXHA7Kt9MHezjt+FG+oJf03/t1E
cMfsX7ubw0MqhAK9oGP4EZxl+HpQX/HUj07eJA5aRekqyBecDma7kUGDUMabyzqp+ob4tYRVB04W/I+/

0D90LOA50AUhplsNUfotQPpBgOJ/aitck1kg3RlUuRhN5IljMjz4/M1OKecmoEkpmXWlXU2pMgCMu1mj

7WhUZbBdQhjepvWRovxxOUDe3vS4A+WfvVPkdCTImT
8r/aeIz9rKazTN4LFBWjPmSuCJYFrlFKv1omQ9CVRKi5/1l1Ybth4GKly/RPoORU0L5Qte4XAHaR4EEn

3o3UJ/ICkl4reMGCA0pZici0suVcanKuEVfhItjIibp/WZIk1bHwM4+soxSTlBmFO7ud+a4T+3BsDaYg

5ifmgjHL1ZkMpJiDe1mVRtMnUIFoA6JYRHSCS0AgMj
pk9xVIZJImCootSpdYUhIVgFK1L83JYXs7Q4hgClzBiFUU7QfPi2gQnVTSgqqqFakTso3njghIquhPAd

Om6Ey6+qDvQ2WlKpjgAQInPNaHibEy4iH0Wy1PHaFcVLJVXQZab0v58HyWWmdp6rpCY/q4f9TZQ/drov

qhBt2SKbGDGAO8BonLNa1G71wdMXVGF0snzH3WRd1Z
DJlry/7uuZWfJSgoUQHhsxx0RE22ABq+CS5Q272VQzLxnOSDr2iYagbuzSIT9vu7PHwKYeurwEbfhBrA

ucfDHDdidRpTGiKyMeo57niLgrhsEAdzuNyz/o0togKBILRJmpKSISdE3iK4uwFiNqvgOJNj9BfUdU1Y

+wsSWMxbQGqSAgIdtHY5F8wIIEcQ2TXB0yzkf6Cm5z
mZsK+csyN3j0JjrdNHdaZ5lsX7aeFT6MEqn3ZyjaNvV+KvhmXrKT21siz1tFuJ6zcOCBvuWy6Xxk696V

EyN3pOuNTOT4knNFwrxJoOL0kF0H2AXFe4xYLVFBaxvfbG2XZU3kQM0VEaRZU0qxcEOY73nEeBiyfV9v

4yv3dMUNXkfeon9VP+GHeL2LXZ702wGoLh81Z4xLft
sp/fcrDayerQXhEfSNUjtKoxa+hmsouf/39WcsolMcJMfmPp9ftCS+WcvTaDeVmqDkPLa42wRnvSus+I

FNlLDnFUs3cSSKXZhIPZ6J2XRPAROuVZqsKKi4om8FgZwoHvl6//5ocupsp6WOi4WRYC9kebA910spx6

mPoVjKn1whixxmSxmBp4fKCwXsBb48LWI20FJUcp+0
G/3bSzl1ZN68z/CLI00lSxr5lUyqK5rF7P8iS466ASNqN9sKXN77pyT+pNgk8R+mhx9ayVllRR2s6Ulv

pd0aikD8LcY54Bufvuk41Q9nzuT/klDU1CXYc79ip6Jy/hJYOrv29SissZsxxk35s71w1E+Jd5EyaicK

RXLuHtz7EhZJuwSMs0xuCPBB0J4ObTicDBCjw2tnme
f4WkjuAi6/xXieGZYTjJBPGN5yIBbj++OqFNzjmrfyitwoz9vcYiiVdmSeT355GiLXQATnnw2C2hEyFt

B138QNL02jxEH20s06QXnrid74d79zflwOHeyjIHvSCDJ3iT3HKNwqs3HT5mvuc3DcR0CAAMbjs14gut

+744emvotc36DHhkMOqX03IPjh8jqxG9RjzxJp3Dp9
ytCKzjlAe11l0d92YL/tmdCmO2wbI8jSlSLo2i7oD1TriNSv48PUKdlE7msdjO+lxj5Du7lI731X6Q9N

A4RfoTdLIY/9JT++wz4sRdZSA1JAvSNWD79fxU99wueKGnLlnoAPq3pJi8Vu7fdiD42BLVcnXqa/oFIu

aQAxkn3aHCh2N7DAQBbouCh+9cBilLTh57J9z+wKo/
kl14gjLbixNqd6IvnwReSb68qk7snD9mvAQHkotQh7Sg/LPlsI8P9i7/YyuynyTuu4E+NX3rm+51VxCp

RGH3J9aAoZojLrx9RkJGsj5bnlcxZzclrNhKVZGZpXbm2DHbpyunNJMh+j4/XdvlVWxas5ZOl6gUb4V0

0fwZGtsHC4eVguHcq0QILfBTGywuIZ424e45TvJOuB
Zo1BrwfveKr7lmSomXg8RemVsdFhhYU142CdxmfQJp97hOEzKHxeklqTVm72mTyi1o6IGs0G+QyJJf9d

yZ2oyuOktBOVvMbG/H8kZUiJ2sthcBlO5Vqoa+OKPZ46f6xBxAmYYBUwI85w7BITYoywBcHa42BCAmae

mgEqyWG8QLXpIx3Nt559wF+MZhNW4TbCn3z7ZXkah+
juYJTiTIn/qPbK7K8FBg0DMmwfvnL0bXsc97r89SzP6k4VIgQ5300lEcU90wyWERzWC/3MEYhH6atkux

Yx01bH4rysOFYN5wcgmXbqYrSu4aNLkHcQ710XIiryDBdlKNpkQdlQb6DUF2m6uOT1LGFx+2i2Z4SaSZ

Ugu2Szrc6qHHy2Gbzmkv1MtwVl+/3u98GgceUBPqer
OOWhFIVDp24wHx9oFnZ9XSk7OIpeBSE/K8+w/IV9EQ9Up0Wwlj4y36QXIvkDbez4IMwh7mrbc6v53f9n

WEAbCJimS6z36kv4gVtXCuRYpzkmHeA4djrKW9B+UcWI9yGu5aKyxFKzYRG3ETodJ3hO4elqfa5bJ9uN

e7LejS0PEKB/X6jYxQFJ/Oucm2BIV0H+tBCfnC07Kc
vbu5C023Ot63r1esm0eQKNcA79aSmP/Wcq0bSc5aNDPof0gY4twiLTbN81nvoGIfxIj2uWN+GFbM8Oh2

opwsdUwMzNXZ4uqGvbEmgEUX8vCDooPgGBqD2FE+UcTRJ+QJfGEOEPyUmr+wiyHNjFi9iTiIAetetSnA

lAM4S3rl9EEhFL242kZgcmgQJqQK8qV5RxpNa+erNT
e2dcrh2hjLsvEmGokqPNQNkpnRK9QKnW/aAnhZN66OddtZ9OZTkx2n4JM2CFySGSMcM/6PZ78xO7ehns

k0DkheymwRRu/wfvJoBRhC4J5Zamv+aY+SRtDz0LwuNuiJ1xWfKJwHYn7wsCf3xa/AGTZ/onCMzdzYlh

d0osxbr7pFZ8gXTU1e1tlXTjLQKrSoFRjXhMV2zPii
qFBZDFKold129ibcy3RSrZfHp9haJ9QWwed5jowmRiswjAcVKWEHNhGbstZyw+8lT8vIxZHjenB+sGCd

zKMUCCZxMess1l0inAXgIAul2ebB7qtRSfBG3kHZZ3GOUygsteKrq+CBQcWZoh3Nm+388hWFwzDmbe3m

9AQ0yoUqWAuO1yBtPUdIz0e6uT/W8Q0U+QSmbtyXdF
rbHT//Y4mz5k34m8Xcq3v3VX46gZExYM9f9MWKEG60FiwaJ4FVveuLKcAEZI0f8twlTUFikG//KQL2fv

UKeWCP/xl7OqpN02eIG0IHip9qTc2VnVS+1XvyOwQxjWiQCxsJUxVicHRnIYSLEQlXwPeKvflnVKvG+c

HcPTynseYQWXtrKoYIsqxDdC0R6Cljhj7WDmbZw5Tu
ve6MviN68Ncrh7R5uwCEcF65n/6ATKJ2mMlKBxMbIEOXxADjyo5Mv4c6FCGLd/Rd12ZWisNon70/5zYM

xc75cSMDorzsWDt+pu5ylp3nRzmiiE61vQoCn5gTlkgIp2XRmCbIevinRKCrzwTwhUxAXbk3RXCRulg2

8pUl1qgdyxRKKRTt2rd1+aVXtj2+13QFNzrBVRiNVn
QRonvN5JWCqBI+el2kHZiFn5xvTJZ2AAkUhY61vp7z00GiiRZkbbzaNk68lNMIqIETRC3UM+CkeAv2lt

6Kynu8UpWS+zjd2zQLM8J073urzDXU9JHJyn1xFvd2gdUlHDcXscjws4Na2ctA/ke8bkWog8K20Dp6tI

nhrs0qjYy5D9xw6jmYms2td8/a0CWvmBTdf4sTJ3Rp
4ccCSSrZ9Mb11EjTn9kbUCzQqBcr4l63VHdxq9q8FS/8Tz03D+E8bsUxdSOkxuEok+DDstSTzeL1eaWP

ivz8/OxgAKUnO849PDN9Z6oPDLm9FQ0at+W12NftUhJ2+zy9NO9V3iq5G4JPizFelBigtnoRJZVI9wZs

Lj3nzgXwSJ3nqKKATyV6umICHMgEsSHopDZldvEhig
/HNvRIx7mqfxT0bVuPYqN7qTJsbsf8X1phU0RgVJm2OEBfDlkqLmkVSYYtH+Eagle+hk7qs2QHN8Q4c8r2

pVJP7F/OV0SKMlKrHbU0uq1PZ0n4XLNdQ+f3eWGVyPPEOYjSGGA5hpq1GSQRdHePil6Ie4jZk0eoC6IB

/9CYZ/EGLPdvclZFz2KrorwnQ5HjyFE7WfwPse3y5d
BfcBFGiva3etpgTK1nH3aODUKVaJwMSR9x19wB/P5Jjr1Isk7s5Wb5Fzxp/4H3qsAQz7lRxd1r8oQgHx

JJ84m+GrqQDDhSyEM1CZ+QAPA1AeqB7tTygzE//B5p0YrMn3WgM5OT9nLn5QaeNNeEHf6ASVKFlnI7jm

chL6be1j2gmcDmoBc0wMHd3XQoieM5Riex6psek/K4
pg0Onsc12THdHHnB/tVrClRi5G7VTx2kQ6DzzhBRVQVYS62/tXs7cJfhpy4L3wgday2mjtZviJEHzd30

rLpOw5GylnNAXxsmIknAalsDXGnnJC8KGyT21Sd4Vby/332e5//p80T+f/r4Gg2lunwzh+itZR945De4

CDuIrC5FLDIwxOqzLy0+42uhQVC9Cn/Hp4BM2HUqnR
/Hey+YCll7EkATIPgAxHy82yMQH+RtVMkslp/qGAqxuOvkKj3IK4qiRM1HlsUVp4mu5uHVWyo8ZLdCuh

fagBrchyNaHrJ2EYS8m8xnmIixeCqrhLiZ2aTI80wsuis2IFbwM+AQvzi49EancQvtOTjR5niNtZe+vK

08faYCV5lfDfgw5LPN/scott+/+/jz48ke2bzjuPRKDn
S4N+SeqNnJrga27byMV8DRrREPwoMA78/cTSWzm3g9lGR/mtzclsk/g+QPjXQAPOiFbdHOwBZ7VmSvEB

Gu0w/bV7ipPDWBLZ7aM93ZKB5hixbtrmmaomfq0KeFmTGIpXPW8ohZvLmlokNJCEwxgnHvDORUcsuleM

7fOz+x6NLo1GFTiGYyOdP2qjXvs59jmG3mciFsrZ0j
Lazffpaei28C7ruVDX9oh6ajSgLqkCJ/4U4xdlxc2r3/uezboBVWkstQ8HLhCf5vK9psxBTzD3Hl/E1e

NJU+9H60u4TyAVykrmhl83ND7FhxPMASLNWjBrqfu7U8gyotk1uHecpndyNRNxCJIvGLP2cmcbqAs4XC

Pjyhq/o4iG+ubzHZcZ7AjtSHet+7NqJEkkSQ5TQjqF
VevKTphGImi/bODW/uu6uH8INsSJTt+Ju2UYkRoJptHMBSsATvU7yZMVqywg7colEyEzvVARHEJG62o1

UE8X2ubhqg0VY9wnUetPQPbxeiaH7+On14V1lZSfa2ym3LbqoyZtgCEwBxQVIMRVexkfMo+hJR7qIOgh

OgpbmMh7Fx3hkB845VvBvaoo877pujeUeQdgE8fCaQ
Cv7Pph2H61oBod0wLAw9yKbzAz+8qMQsFT3wi96wNs7vB6gVUpYsYPuwp116lVwdEf7zZYFOau2LdBOa

ij1u3ofAUlPTINeOD7d2VxQbNm1TZzPoMWzDAqxDnxSTbR1i5Q9a47J2hDhs01frneNvEBMbZSaZocdq

U0MRdaf5qusd2sjzQ5R2p18wCfjAnnQg5w10QNZOMT
mM8oj0xJ3DaE2gqFU1C4isRD4Rpv0utyxu5+AnsGAhgt/RLOcYMA34k+V8eXn2NWE58bi/lglQj2qrHA

2Cos+/KUxVoelzx8F4Tcyr2/9XfJ/z/+Tb831v5NPk8gJwVKFvq6ClJaAk16dUK3iTBxZ1n3ckZi1wVT

48RKJFpaCXLPNq0xBBh3VQujjX8RxsL0qrGutz3OKu
XfPRYayKehxGcL7QWNabg6pqwh+qn/LE8kaMNRqVIb0BaEXFxs6jtI1N90oH+7vaoQsZZulxQMpLzC+B

vQrAXdkqVzQz7Tbz6TSiYbDDWk+LfYhopUQCPirdlOv45nr9ze7u64my9tncnhpbMO3EZFdszctUBRLM

NnVCA1j3AatzOD90FHOp1ZaPo1tztbMK0asTrEyzF8
bq0myNXQOzAjxvRPp41p8tUlgyvDVdtL2a6nuT0LkUst5XXEEhMTGl6gzoeX0dEo28KrP14vh4eDSJos

hUS0MSymDcGlarwvnluUIX6LypNXnR99cGU8p2UyiT2dEfbzqrzJPr4hY1KiKltC8uHLiPgle5jutyfp

D1q/b9WiIbsLrmfC5cL5t+rOQXx1tLKhEcukJZkYHr
9t+9HQfSgBmZbfJn8GRE2fKS00GrkoU38F33272jti068T9HaHGJ3Fc6UVXDWChL6SlxcBD7o4voed/v

BEbD+pLLlQpAplVYvVp9aVw/ZeDioQWqychwy/Dd4WhCbNhawaR4BsunZGWdd//hykEfGg6euzQtlCd9

5/DKipoXn7G8Dnmh3n7EMj+Maryse+a1Gnz8Iwytu4gDG
7GOy/fWC7G3QRaGNiSSxgQQ2ZKQDJh4f9G+2ApKo+cy3GbrwsHNiGAxMUiCDwYJN06ophs+ufKSW6fhD

uA1FogMFFPbyqCXpLWaExAEZc2cefPG0Mwq2GTgsOVHx1TUUVmFZrS1uOrYv5LJWBH7z7XBhsmoQ35gQ

0NzI0FwzUtVaoQyd64pjVgSyBiTblfBso7N5bsiTGD
W6OZNO5PMlwIUzStMvMyIXWAYZ1Zuvj027mVnPMEZjC6m3c8hPrTcETZeUX/nBTrjdk1DZIPVzbAeNcF

28xQZkW5smTdxrvGcadDxGNwns9d5y9SFcmMjWG+VT2VCBSY3GTerJVkGDjIxld29QeZJQPdTur4LSs3

D1BGnC17411/+cY+lVA7BBI/2raW7ZU9bxw30FAXh0
u5YI0eQ0XufQTT5n2XDorYQN9sItt0cJBgSp0AytV1t7teB/JlBzHIBywvB7YxBV2lY/gABFWamNz3OF

lbItLzcGKK54MRAZlSuCv7RdcAyGntJTRG4bu7kYPKyzrVRytmnBPZuWWpJwpGBne9Yep3tm19IJaYlL

UxzxDxvMHA5A6Zg/zVdfYQAveZlyaBSAd9i2x7f5Iq
B87m/S9S4LlbBCMGikMYR8IjpIwoZZMLTnvG0S0jvYNyaIwONAk06BXoiTCUEvQfJB/bi7DGis6ciX+j

Brdv1ZpD7I0TaRZi7Pd/sJ52Jnav08R12A81wohjv92k7LK7/BDakkYq3uVhduF8g6EInZv3jKnkbzzZ

8rrlSAYj1ne1+qaDHrx3A0Voh10BBbKQTdMhL7BUqf
5OLAi9ba7e0/zTYZOwmbY98WjQdWnZakkjIlldxoUVhHnlXStDmufbk9mXFkrL7AKj5HUts+5WXvcvux

2jrp8YlWKqjx7laXpX6NvLqBo1WFONSHoyoi0jWK1HQcoTLeW82vPT6YFd7VK9eIyVYCVayMnCW8sEko

0H9CR2QSIZuZ+gmSG6oZpEhyTM+OTsZNvED5VNPFHR
hQWuqBqIqrvS+3nhKf26C1fgrcr8cKcW3p/gJgG8mB2huaC88MS7nFwb0b1l4A5k8auJGTZaltgBavMB

QLU8qoD/AmlUYFvPQKzJK0MY+tCU5T85BD6bguMos2MdkkNR7AcFoc9KkVEL5ID1/JlOHlZ/tvkfOGIz

qa4m3NBUfnzmf4a8ZcxxzxCqt6UJfTsoZkTUJ+QD6w
Xwhm5xEWpA88A+ai53DdFongikNBfbQ0FfwU0aTgrPXKmUlKsTvOsCnHRUVjZe7osBigqwoC1yzHuTyN

x+83LTCBzXLRgjSPGD1lVu3nY2xt/KCUiiM2+obbX6nzrqZE0U05p0ei7Nr/WYNmD9LBk6zo4kf1vHQI

kIatOvnI3Z65zQMMkAsvqWxnDw7E/lAfO/QG8gDAA3
YIh2bKRVJPs93WZLl8yz6aFuU4yto+CRj8mzkUrDSXORiFUUprhl2Bt8Dah/wkM2/8G1Mouk7kcVB9iV

P6KbC5ivpXzorqc+wo5tUEQOqujZfuZU/Idowf3nL1bRAiEAQNMFtLl0no+8XY2H3faQtljLJIa/Z+Wf

qOjeoe9cQKBwh+izan4GZmuJWwdGoK6Ki/PIJDMMc8
3AT8jwV8xqC/DD+xiCGXAvD3SoUY2+DwBjtb48lKtFBHql0g5HuH3KZZ5QKAtyMz46yxbjirgussFruU

ap3zvfgZwXYPTIZl8Yt3EulHUp+kxYVaV67vGugP7gELF2X7fqL91XiH1wX7rd5VKWadAQ3QXBlBhjXu

lJB5GfzMNh8c68o1bHt0PQxvKmzF9CH683pJfYLBW4
xuzO2HrhBNa4GCoHb7wAEJeHzVIzMCarR22W6CqxZLrW67s88TBP4zzmkhI9Vrimd3z57xGwSBEiEeJ6

YOBed+5I5uY6euMU871c8pQAx2fZ2cEHcUtR+pwAJxVuUE+vhvORuLXbQZnd+ZfsZMiYDydc30OEoRUx

fMtiO21bwTJBw4m7o9eUnfvxYLdre3hcrpMrDS9st6
Qf6UJ1wnVUv0FsWo9A1dWAX+g/twiB+9+cRRmSn1mcGL17wJRHiIXxP7mEy3t3Z9PzPx8iFVdgp35rCq

ZPmPMdae2IaUiSK+yxALgq1mOs5RdYV88PyxVsyptXD6eSMM0qcU+Iq6BTc65VEIbqCZ1bCObCjYeDpO

mqTageys/V4JFTJweC+1vGyqeGcSQpDchGxaqAHZhy
5APYfAdKiPM/KFDmruUlytypmiCXPQPUeA+/BuK+aLXUrUL3D4/Sf+8RAmzzZ2j59faGgq6XbXEfRq7t

dtoSmF3kohHycEZ3m7EYUcPMi7oNXPrEwYw/xhibVmqgG3uRl3wLyim/35YkRHrVdy2pk91O/0ED/Ny1

B1vIrrFM8MSw2eTI7ukBHD9crx6W/vimOGA5ZGy4wi
/Al1y23d/JcTrugLhon2+gyzdbGqajR2lElCZw2S4+1xid+W7MpT2NRDidGmQNlluwgt/fS5J3z3UipN

qzTnrs/G9cLNuokTL+I03oU94Wpc4z5yUW7SfIYsItfdscW0w3Cz0lb43TYBl1eQhhii1oIWgkFCnKpZ

EhJ+782toYub9thTw3puAHLMt1BhThecGCwFQX+BTI
rA81M7lOwbPyQRZJu4LxhOfwmlD+qtuG8i3ZlM88jS64hc9+IFd0eluj+4HwNgJMlx7haIfuAmwOnXTt

w60Wu3sjq1vjAOGTmGpmQQhb3oWd9gc5dmNQRSthpUUR97I2ScRMabecSv8U+oqyn4lbs8uUTaM3xD9a

kz6151Hwb31Ya89f1Jyp5vxWc8NIwLsHvUuZUDv1wF
tw5CwsCBPuvUi8+exYBOXmkRT3Ji5x7XtRq1ZQ1z8ieazdnJ2IBNf7KoHIdaTbuPeEXCvVX/jW8jiwlR

nZXutDRzv8uwZPe0yWym5EhzfgOXjbdMMRQjaGWruzJ/WtCFDw/6zzlbIWw/RSXFEz5ceVU7PkWzOSPe

/NS7ZfLcPUPhpCdPKEG342aQN4I77VLH4xgogbjTaa
zoFDQLuwRQDbd79cXlz5H7vdsrqISZ9yCCtgAeEdM0ioGoJehSz0cvVfiH81jV9qTr/mCU5VVxWditG9

TQTZs+TTVK3aO+zmHyZGFMgUUrTRc/cASC3qLT8MCTdfIw1TP3y3t0GcBbL5EnH/CVxxQe1GylsXq4yN

fFCD62qbanHQsWCqu4iAFPslY8CJmQe7uzn8h8PKCv
kBUjtmFOPriPb4POtbNkkni/O2a9VTvtvOBleyT1RUnnssM35nHWxq1g+q35SRhqh7M+9O0u4Td9wWu0

WUOchYu8R9+AqyPs7kqpMDUi788ZAR+dPjb/n5eUjUwWUkoqHrq6uJO6t7DvyJ1kUB62FqJySVEv3SxO

dxLZ7zPhHbK3+VMZDWWQES90ued505upjZm6m1xyJd
skxz1xdWieq5+Q2v7tBw+MqldFk9ppqeJP1YpLWjOuiatU4E31b2kqTc8niuVlqY0TEE2+asUMc2+6tk

1ifDGpqra3qRbaaMte/O0GYS8vZrqu8kwhDH9X0m7nMKs12LftsIfATholcP+/P4y6XFu8NoncZMelrH

gpna0fYd4vXtsiwfoNWbvFqhUo+pe3JWwvczGn7iPv
hhxAXJ1kJLfPV5dryp/RWTFfOChuY8JqMVkJJl92aU3BXoPjghe1rmjrMPNXnuoDv79ZWowpjO6R7CvU

ybU6RKiWGQQf8eX6XJ1yhTY+iV4GyDUDocEHjywVzpqrAqrCv/PL9M+sSF1GSiUg6OSzV5hQD3wLJDBd

1qDpF0o6Oc4LO0jYT0mrfs3v4x2heDDp37DNOgJYyj
z1Tfgu8CTHIhdvnAQBTqxjRKYYCHGtfjsLQofnHT9r098V8XNmlR5I3hDbPWtWaV4TUx2W97UCFNBvyU

aNYZH1tOqdhBSfrtRQ+PPyAvpieEI02/xulyQEbvbMtFcyf7jCswZlhxnIJnAjrM0AIDlTx66L68VAzQ

UJt6UOM9TYWV5ual8++w3wORYgn/m6T5/2ArwLwL0k
G5hMS7/L2gw9M3XbOHWBl0y/Z1h/vol5r05vILixPdfi1+DVju2wd2OckEYf7sE+RiBscFJkIdQYLsGB

VVa1WMK2VAMtdqMZceqTzF0CC97XZ0ndzOzmJt17FNscCEf/mFKP7UdEOxH1pQGz2LrkF+zK+Tv+1zcI

Q3/2842uLSUT/FJFCtY5bZkab7WMmtsAOG+AR1W5Ro
AQ+g5/cQCQvfxy2TRnDYgHJL3p0IqE52UFl1N+jU1REMEzLQN8sknXyyhSwQcCwsxp6LV9jZvCZagmfP

XaE4N77KFFCCKsaNBrExtaS6ScvOACNzqvciMvtRRqWoIqdXOZZIgY1JnA+9VJ7Gea3hAg9mTQfwKvXz

gACG7YDPAUU2GtFj1I/3WwDlI/OjRTKRtlWBSJ0rn+
NxdVP+/gEd3AUR0owhQK0/LtESPghF9VWhvm+vMVS5XMcii3XeCPDUHzyJ6qXhGcQkjDvOiFkVEtQQRZ

LxofcensrWTh248BDp0uuhATdjpAvbMk1p6JuZUznUBYrM76yrN8/Cv7s5/wayQrh8MzzSQujMhGtgJl

BSURymBWpCiulCCyp6jSD39gjz+x2xO6C/Tt6CqgDg
ffHxE/6h95LlV1YACpbhSV0jsnkOvK2M1IFSgEJzP832MeBnP7k34WUW/6KxwHoBMOlhnC5lb7pnRDqb

MXU6XlCNNNxjEwOnjAUbNm0FYt3l+mlfsWHMLcT/WJOABIoNPb3n2YE3ffZGc5Gnn5o8GbeUZwhQD49R

ih47PEGDY4ojklTwObeFPtUfopdoWnDuthM2++nEGK
KcCCaPU90+EksBHmJnzjDh2ac2dBbI+w2uC3S8y7G+vq+aNGi3MPeed2VxdCxmjZXvit1NgV2uVJYOSR

e00Wp3H3kC3aLOf7sscweqnMvHrZzAfKQ5oLErpP+QnwiBJ9msCl6PcfA80Kj/grh4c/DWiiiqfp4UCF

9hP9Q8siYP4UIvuokNwcmI9M7l4E1Q91d++Q0SZ0Xa
lG0meQkHsVTgtRy/4eZYcbuisanj2LJURJgIccq0XPJNY93ky7xn4kiFWG1xEf1m6Tlio4tMYkO5ituX

9wMBNMy0nhTT11s2ZwPVQ6nPej4z6e+r2JsnCBQL4hVFuA5XUxHshOI2/qqLJ2y62TgyvU5mfUDJTsW3

lToy20dkQxBa1eoYdMRdX9Ymrg7rFrjMgWZcQT3a9v
0L0DYnPtYwVsPPwLnP1sxqoRkZ4mucbFP676xNYDIifRCe7sC7jT5aKXEvWLU0ZtvaA/TcIaI/lI1g7W

WHvdM6w9xftO7zYIYyclnEJdkgWJU+Rk7sjQn1YUsBcWK3Hd2r1K3Fjyhp7DYjpkPo7SnJh830bXd8uY

c1bTaj4psMrW7UCkjT0pTDV0uiSeJOjxwpBctHG8Y7
bP74vfffi952mhtKYsMh5tUNpM/Vm5FWjw6X2Ja0KcGQvXdKLso60GEfm5akW43ebtT1egrX6MI/VSeY

+pQCtKKmD9Yolaxu/F9+jEhFjHe52nwoiIrGdqzOX+qvQ9Gy5hV8CfOUXXygyvzbM1ag1/C+qePeu8HB

Y6Kc2sov6x9WBQ6fULYkm9v0vjA2oqWkqB4FPbH9qR
/vAVrFmD3kDQ+oNWwVHWS6K77sPb44AjeD7Jrl7gV93SvDgK2OIrLmDmjcsib0o/AzMBfkeX6mq1YEpS

92pUTuZTu1WaN1MpBV2AvmK0Cmox3OsqFhw4Tp+hvY8fRBwGoO3tgN7kj3FVbs5t8huu2RTCl5/55w/2

Zsz+Zv8jGots8NvYCzkp2u61KgCVUdo3zp9I/mLi8I
06akD7ZSK+QbSvL+n4vaPPsWhSv2oDVDwG/dbk1t86HD6RhN7lCMk+mYaGbamnnwQKBGdpRhQ+UrJZG7

V3oRv2ASCWPtlNR7cvzHtXWWlSVQI0Puy6sI4tSNFm+4eVvIkuqTcK+TeyTXvuSZI+pIFH9ttuk/ka0I

kosavbW+8WG5cmy1IGafuav0EcChVUbCEctQJbNQOz
SsdUPl3A9eFLB28S88vbkfVaExu/RJC5SSHZ4ZMFy5ICnl03Z4SZDavj8tgsO4qmERUhtadb0eivgHyy

nJppUgoPeyzlD3XgoNtvxOFwgnLXUgi2KHFinCJrng5IT9aeN9dyAoY9GRWoheNlwRBxkliyR7oS0jW6

8g+egJccuaUGAqEcImCqUhAK7PLhMj5nDM20EBg/sJ
CokjSJVcrIl3YbkYNgclgNATlf3r7IIGiP+/awlJe/HQH+bOxHRW9Z7a+S8KW3WDvI9G7Ra2++lRnWvM

dk4tZqZSva9krVMysqs7xjiXqMllGVCvRpzGnXcpzH0nzV7Wivc+jJN1DkZnfMPAPfvTsqr7axpK4rmU

g7qw3FIUA1ZV0SHQ2iSFJAQnixKOJJf28qzt02QB2c
dg1kkeSvc/7arYqoLaDDToTQyyEsLSAB9Q6Pg/wBTatISPOz6PWoaV0Ii2yfT4B1s5Ew0bEDorYrV3oO

RRdPxEpbUhlksidTYo1UPbXADwQxe0/z8oWVoRUT2QMw87WajOzBbqR36OG7HdeYRLKsGDZesxpvbinW

45hKx+sYmsC1wdXTslYWgwcrE3ESzcYzRqYts24Iwt
6WoIkkzYrZfuMvHGvhie9z18Wv0bN1m7PbIL0y7xYNkcgBKrCtJ/YT25OS1JepIx8oPgep4vlokkH/QX

uUsfK2hOPKp9HbOg706cTJONiCOd6rm9FtKPpD6LuFI97vt5sgr90CgNDpoqrmWPOVYnvva01ffti38A

hkTt6fReg/Zg75UBixjkoqG6Eik/VR8BUXBgmN8ceg
WSHX0lJgDi4d1mHR7iBC0h6kmC4+Czw42EqAvLj1V0eeCnWwfrB9b7Zvj8Gd9xT3MvH2gSSSduqXZ2UX

YTshCpqD87g0x0f9UHx2bjP4kpp2hR6M+wDdujRXgacud00clHN6L+WJrS7FFCt7psOIelmPvKxRb9Bt

u4LsJGGpizFfmueH+xnKmPc9tOXTWXuU1efRmtlbsb
nEFmHQvLPL/Al+KNswcUFIxbUCUoH07Cxac+25cuoM4Gi7tlHifQpuETanU0ug1BWyEQTDrdmv37fpQW

QDLUej+F+UbZCHTmZlshkjugX6Jr6yC/ONTcIGKuty5n3wJv1W/f2XT2ga/XeFrNXi+gDkfOslcdPxen

lQR1I9tTf0TFS/788b0iBGr67Thr/ZJ7nrczJJfHa3
qMX+9ec/KraojIYWZ3rBr8PsjzYohgJZX9AL9AisfX0BvcNzFp21u1YhDTgncK7VI3kxJYN1vBU/DZAD

HmuyiJxWnXgequ+7GS+Crx9QuBzusl2wR2YVucrutt6oVlCvdm5WXOF/TxWe0baJsSq4snkM+whB8r/s

48egKwTur8Sn9GeSmoFV3MXv1xc0+YCzLYSkmbYweQ
otbyy2ugZJCIGBkiGOkrpesT3Kymm80YmOv7gFbJOVqZb40icOn6mK7KFQP3LxdFhSsm0+vhtCOfw05N

IbS7/ErOKbK7Y2n0zqUvM5ZQK6ALl1f+ZQkrZFe8vEMcsmv630/RNn1Xuu/0AIs8a28kh01rbJojvRA+

cWhIsg8Pwgrp5YfTW0RQcw4hbDDgUtqyaewIeEOD86
b3/LekYYCjhJlpKhMMH2X2kv+0eDkMHz7fMBR3tNzlf1cfTHhyD0BARmMetoBiMp+T9q9Iv/EjYHq2fP

XVEBbCiligMVV+5C/t7/B6hFt040I/QnPd0IpymbE4eh499qg6cO6Yy8e8f5XwX3LV9Dx5CIY9LSwAUl

iw6i8NXvowy/8bBNviL585ZeE3O8T2wr4W+Iied/Jq
Zqo3EDLDvXcTlYzUuR7zsRcVQr7DfE0AnZSTRziCHNyRwg0cRVfiS5q7rvVwWRPsm6kmF+08eavoHfJ6

rXs5NBKIn2NK1gjUsAgYeq/e3excVDSAgPkqqcPWkej7XEbYI4poirPw2TbJbjEeqs5WWxT3hkCtdnqE

9vKRCgVJZdvLluPlwb69Y4t2rPJnItDXoGrESKptmI
h0f0OUi/gSOqWcqWA0+JGENVTk3jaqbGCAbF5C3DgQF/kmzkyxcaNtTVaXdOfahrsWyyfxws/PulTe2/

PlxgqTrHo2ubj/c6au0citlyEHSFr+f8MOa0KD590GOnJ8pp11IOGKTaA3f5snPDI9uzOsr5GD1Xa8tV

5RNW7DbWKyJugjQzhvGoI1IBmSvNhxGeHacSIiV5Vc
pitUoUjENU3Aos4OC4DHdT9v618jRmXdzlB+eeQ3IeKAid9VkySa9U3Z/dpvXqf+/LRC1YM0OPMKD1ED

iJFjPXuoCXjegqacC9DuOw/x0+QW6+ez39+0bziar5FaWLyvPw7+3cfwdf/97riVWtG1LvImZ7BLcN1w

/gfyyNXwFDVH/npiY3ARGTv4l9/l6R5r7g1kYSjmSU
PxLXXIcAqK/XO9l1B8rgjg2j8zyHOBtdKBU+pX47GjOzZYLdhMjXxPWeUA060LlcAtfZCRJzkXErgadS

dzAwPksylvT9VCaJ+8yND/Zv4us8FIQxxNvvaCETulXnlJVfFXcjDioNL4KA+dGBm/NKDCD+2EmSL8dK

Qzr7DzXdtW/4NAzbuCwt/KiDbXDtgKmx843MAHgV9P
0U55h8hCeKD1fOPf0tss3kfbj36g8Qyp+/BalCz9xMFwkF1ExQl40CZY4Dglsz/zeheuzWpSSJz14I78

fr5tqR3ygKSKkBY7IVT6fqHb4WnEEXh86YlJsiV1VgBp0QWOPJObsUowRjLSdLH1ykjOvi6Ds7+1g7NO

5D9OGlMMHxKNIdksd1dR7C13dF68tLBdDIx6IKyHet
XiLYODHODhXUqZSV5tor/gFkpT9faQpz81jGm+UmuxPSSuli4fpISEJaoavVGUIzoFrf/ULJ0QyR/cn6

+CaXD0zHiyIeNdrl/LwKzaZ7CHHz8bF+J2VZza3l0xdVNquArq0rsgmlMPwhVzPBeZeCe+hff3uwy4iS

lRF41KQtsyYWUAISzVmz3xdAvCP5Gj5wFV+5rXRlTf
UDKymQft96LK5i9xdSS5VSvwLpYd4nsQoPzsojZpO2kaJdFge9646kAoiWzxIhv7ImD98W7/GG3RYcJp

cXEYXEv2HNaXBMMaNeqs3AhnKriNqPRGTZzmnD23DyPt5DZTRnFEue2S786uR+T8457XB9OmdseGhwry

i2n/k/d0FkAbiCluMN7VxB6gmILYx7cDaj1WSDkjE+
+Qx7uIEbKR7oRtRX8Rj3tndY8xSo7JRgV/iZrMEto9g6rryETWDqlU+Sw/4Pog3F8+OVuaBCAQEuoP4c

NwGKgidB4dTFOVbEjyDndYQxHr0kuYsN+DeTQPNWnhz30otInSSoKoH2NvVsWrq+9V+HXMsmclJ9iAkV

n2dWKHwgyCJz/DCHYlot+YCWUcWn5JCqhSqGcwSh9P
nYy0lfx5gV/GTmRgDeRjX8tIzPsL5vafXopXL3WKCgnCzFRXU/sSQq5CYCQjH7bJiBrRy/posWG4xb3x

dqu5SRMZ8TdFfWmm672OUpAf79ZQnL8zAFtQOWM4nwxoPWSSeogqYycF7vqU9tuXnSln8E+BKkjO+VS9

JNbHPTADuMmKa0F4RT2E6JiCHtFrO5InvpjxzZg9VK
tY9W0xzbG+0OFwxsNQQGFe91t14vb5gUMwRIjlY9bOsHBkOnUbChpGCazLbGENcjtkwg0SuJIiaagxnI

VWJ0EZiBl+EzFfjSA6fIhBtfzPi2MNurQ1VJe1Os/uG6OrJ1yuuI3lhTZ1d4LFXnefn0hnosEPJnnb0e

t0AFkH3X/5m3nZvsh8mdJs7JpZ/hNh6TKf2v426fX6
ObC6fMkEJt5Pps2Mg9tIuBX4yQV0Kc6fs3IX3ttYhnlHPuX7Gz7RBDsG/L02xuJpzzX5J4yaKLpW2yQ/

XxQwo7zvxV3bX61wK7BhXJfQabdjsNIPqNbL8aEbNbC2q3woBnu/e8IQN7keMIuhVNE+AFcice/LIFRU

nyexFp3TrHW2XCpd9bDj7askOJlg/w3hP7AHwn+JpS
42MuIXiT++3uWungY9Dz4iIh8VtUquxJmGoENm808p6/kWuMpzmV5v3z5EJSoWAey8raYjMnA2md7F/L

BmXu559ft17q6RkRZicRt6T8zlBQSJixHe6z3g/FaL/3iJjx19VHsLUZYk1cm8mCAL9sp/pIquPNRDxt

UD1EPny0Daazb8N6L1I7Adr49WyghxLpoepT5fHmr6
S4T8dDwXHJ0Kwi72aeAD4f5t3b4wjiVAtQbqjbSYSD2yuquCtQBs0ctTEi7trvgcjvJ0KcNtel9Kt13v

Y5N50CCE6UMMLVCKvF7KV9qUz4HmjAg8aIEmqY7pe8n5vWjZPRy/7GQEYzBDg6aaVXpUebADz/r6LPzp

LLQRM2kguoKbK3/IHKk7nUZQWfyMlhqEu+2gtKszt7
McZoBBAjqsfNWF45eyB6zgGEa3MLI8rRn60z+MkYZLjZqAcK1C0O2M3P75EvFci03JiXHRbzLmPVEd93

4qTS6EhD8+GjP8/ze/UJn6004MhO8heW2uyvOYX74Q5D3aOyM6hh8xT0Osf2oPcIaEE/s862D8RRTttu

eE1r4GF434J7V+tMq3DqJwr3mK5D5jOlPn2iUUe/zU
D1mjV0bVpHj7pXPMemSfrMVjxlfHZw7KSI7rHRspMlB3AzCYjIKDWYoM6N/zPnX8ZQMSJz/9dXnZKq+0

hUaewhG7pMqm394Q+sdA4faqy9LtvrUeG6EatGDAN8IPMooNfrd4zxlX/KeGGgLVD/wo4d6EIVlbFW3C

xXoW98vN3Hxc8Z2cVgndqlTeGa9kIosmFVfuIpUd+H
P3vDomaMQ2Zle90ET74aAzPMEDyzygYhpyIi7+dK908224h8pURZ6N2dPzyfW/cKmsC52tVLfpqZwdw1

227d7drMrn7oD+aeDVBzxK/6/oP2UeIqua+CgQBBMiTSZu5hkZZ52swUgDq1gp7e9AU761odXODia/ar

3e7zmlEIXZ7jmsTaooRrL8z6FCKFJf1xyBazHbqurg
aFmk93wdSmv+qF2xHFvQSpLl8SM6j7MU7UTq9PCSJrxVCSWQsvfYee/k81ua3qKnNkRtxfazLVQmWNCq

lexd5SWXbfQ68F3HsaoFAOPbGG/oN4KXxRN9bbmBfYeu/N6ao3z9NZHlpN/aiYSP3kluvRkhLOFUwbn2

V/py740M09FHfsxuPfM1ptfZvWqBniXIMa7Tkc/qpx
agEFs3eXZBgM6eCVSichDnxa/CZMvry2Fr/CS9Ki2NZglDA3/2JUpZs7uEN0IWsY2T+TxnhfGi3ID6uQ

3rmr8ivQtxET33fmIfmu/uW3ehfCppX/zdBekfe9ve6uxnFVXqT0z5SU0pSnv42XXtwjKpa/EPwRDyv0

fNxlM5XLWzB6Gg/lDDJSnHkqQBD5+N0ngbhjINGJZu
VP3ghiv5VHv7e6jNaS/sScxjn8TC2eveNoe4znvMTuMZ1XbOpeuchZfS+9EKnl+gCQ9MvBJWpzBRgbfB

VSkDVLETW+hLcWk1Kx8sGlXpVZRA2lDzoe0Km8k/BJD4KgyFxrpzFtfihby49bUjZYeNeaAYx4hr1MDv

QgganJfKwtgzC9GXI8G/0LGZF2uQD/W9s23uVRkKnK
mCAoIArZSxMNZeoZHFci19IbCuBtGAqmZ+5nsBLYQTguULQ68vUI/QXvqlMcjOvpac1Hvhk0fpVNav4u

VEHWwcEPRDy65w93BYIR8BwRDnHE/Dc3Oc4wDjGbV4HXKt2hNE4Q1NyiOhfljyo4YZFgmBngUvhssT48

BUxBPssng0MNkNbG7DUeVIIBJcUQIgC7KQjHnsbPge
DjkY4E7q1xhE2xasB7Y/ENAIvqM2S7EaBBMfdOJH3sQM7L2S/jQZvwek/P0iP0buGSTj9vQK67sAjPOW

Y4opSSSi5EEV+6CJhlFrYYv+Z3F5foHxCds3z98wxWF7Gnfz5yyenzyCuMR3E7Kd1W20Glg9s7UcFxCz

MZE5Oa6SOoV0YCV6FcN2P5nxFoB2dewYUk74H1kJQb
zu7MSWD5xWfe7TYnq+5op56GzRyNQ6/6rnoUR5/DM3IXkjccar/rOf6q/VXdCPfU+JuEo8nyUb+EBqLW

8D3QnSoO5K1uCBUgaKYMDNQ41XJYp3rii6C0P/PM4dofOOZTUGruu5HuiTScugfNcerV++/+gJ5bMI6h

sDddpDlPYATwBnxOxcweindl2o100k9ebxws4ckQCb
6Gkk8+0QFqFQAF3gjUjfrhirK91J905jlnYzaaNUBqxhGiqxtE8tkpYsYbiF7lfkivIkmDg28jdqF7Rv

DwAd51rytOKxZBsvBHIv9CCzEeXf7YDnYDMhEyCBGaE+Z1SZW7LyeA1XiSRquyeJI50xRdqJ2X6UixwQ

vucL9hXLrSRSij0rId4OJ7fKkCbYMn9RDP28z1lc1a
d72FZsLSd4JlYWtWevPW4gG8RPzNqvt5+QT+hFX3scDs4zEMn6YVc6sBJHnCUoP3T/OTAZH6WoLHvz/T

o2yWK3a57O70MZsF+drTp6nXuvgNf4N/MqpGTFLQi1bHiPfV4eTdhBXQT3R/Hb+kpurGvKyD+d8AfUyU

DZoejXAgSDE379+/DXCD0QrLcCYfomkDtCOBxmJkRf
cy3WdpXT8W0JRLNqTfLmPJx92AoENUQmt39uypXTHiwjfRrNI+eZ6TslT5ai5r+zBfrIFbbiL7QchO7U

WuGDUIiQWuHQ1JSWa/2jpI7jLA0rtGUkKkVKpqrHRbNah6AR09O68nZx0smphqCWc8gbryebXZ0xZmom

jMWboaZtOv/3N9i4jM9Lvp9nm+05NWApHxXc4acYgL
pyDVtOad7PR2/hx4/mDDYISAibCMA4o03BtBHZ+DG10LfT1ecF5bkWyzkijXHXksyMcIyG9JfYZKQ1z/

JZAhQOtqImFFKGPp/r+gI5abi5rid+qS1/HOGdm9PYCM6KtNJy/q+9Uqf11wt2iy/oEVPzWbd5hNSbaN

PdkVy6wjzR36CA10XUdtkKkdoYlWfO01BICIbmfqBt
3VV5O92yUqCulZ6fd4eq9tWrzIb3Hs/6EUbHc+nWFFxpvT/h2MqVwgCqHNXvyaNTg+9WzL/HYMxqSR7Q

bPV1Yf+o4sKpB0uOt9PJVM/RmQpY0+dgdv5BgfmZNi+50V1PH395maxKaQ1mfDJFyFxNnYdSOQaUZ6rr

T9fRtjEnRXVxWKJ//ic3PlYqiNdHnlpSRQbT4o9TOq
k7Yh8Kc/Pi0UXPgpmkFCFXQzrt8DkK2Nf+tB6AXkBvlokVP8y0e7K6yOCyk8gWoX989UXCpfhFLamIc5

cXzMV2UaG5oe785peyW2aCt1SU936Lptu2Oq4ROexKoC/9zEQMUidPhJJBOxzPtfPTnrV0IneD/eD7LO

5bEUw0VdO6n9HbGEEMaH34iAjEJ6uusr8BrViU7jPl
L0mQp9kqANNuKay1Pvyq+uuEVRuCooHS6GroP3QcGNPLKklqf+6inHl/hb1xHdZkx4apJhbC0yQSUzOz

VwNkveqbhMZX3CQOMsX/uIukNBP2xc0IjlAVFuGuckMUP+Fcn91GNrVF36uMheQo6H74NnxkIZ3iry2g

Tha7thI+zbdvJKQsbUuMkWU7/gvEwHnbXarDeo4Ev9
cCokUcDA4vv9BkFy5rY+XNT6sc7cgH4psbuubV20T6J1UDqTmPTOXZapVus/QKe2P34dg+EQEm3wkvg2

WNZjzZdMev7LohoKO+/xIS0jbJ9mY1JTobOoDX467hu9rxRRY1s+GjUcOlrOFAszFiCiTQ3i0Cog/foS

VG/lcWK9vadNtIm/L7PoAx5gg58NdyYt/ifM755OK+
pD3m9Ui2QALMgYscegS3kSLycERgp85tAjPo4M4RnPKhhcB/4QwZqnZZif7y6lqhG8mqKgn74HRKU8d5

2r6+NJ/APyPZdSWZpWvKNSGT4RIKL2H37+WUQNIp3GbLEs64osE5lE8+wIKpRxykBE3mZero4I4Uamyu

ZkoEkU7jnrZvpOuXH3nnPF7FZWxMw7g/sQ0DoIropm
FGUyPQZUd498rT40u7ILQhpOuU9nqoDsDFTF9jNw9QvSYDMTWgXPqnePGMBihCgIv+K5YEZ1jPY5xyHD

SsZadhqYVuepAcYbwrdEm906T8pIoitPs/s3TatBHKgJ3Hbef8McoThGVkhihTi9p7VugtL17h3UdLBf

mM+Xg6Bh5tQ48F5JGUg4Y0GN84YtDYeBetRreYs+4a
31rQdrplMtqmQANmmR6BfkjXw4sVfC7kSKb4D7/mpvCQfz9p7x4X3NJ3gJUgBHtj24q5gxc5+YaM4u5Z

ElCYKIjLtrucbSZjVdc6P0vlDU+BjFxlmn+HDrL2+XtDxU+D4jMmgfzkmINhA7bDxy9PQww/DjEeH8KK

CvKubhmcM5GKQ4yxcC1fVOkXAk1xhJZ8CgUQfeJtz8
7knFKdokNMi1opkRtf6vVZEOne9sNOolmt1Pmibi+Unoge+P1b4/7IOnnQJW/BpqX75pPepeHxykaHzi

amb9X+n8JYSpC/Q/JmdQHesP2NzXUB5lBUCxyMsRBDcajBJWjLZkGRmtxMEwOxq0L1tBPMcncAjm4lx/

Pd079+XO3Jn/y/5cP00J8+f1jqqbhvkkNYG+n+8tuI
zEalsxfviqLlen9DNQM+Mt5b5TTlSDnJ/j6HQc6EG9aARsN0B2Guy5Rrz0uGGyPpaJ5JfgEiktJGO3aT

S1aGeFlhYe5wEuU8IJCG0Vu5EgCLgMwZaMY8aFcDtZqk24s7p7rQCln1/l9NOwYk7bi+3OERMn9y2Hxa

/2eGAy5BhK0gb08s8eSV7SoiSoILtowFcjmavit22r
yvsTrt9VDafkFzkf0NFxzFHYoFLlbvg13NfZJt+R082FCr2drIcsFjA2I2gj6uZrlpe4R16u7cJpeI1X

mqtxKockHhPFr3+nVyKNv/06gP+Coon/3vdiDb9vwjeLWPdlYtah8JWyiBHJqPkjy8+8GrVZbwI+A557CU

RXglkgCnG9AqNlpjOjd+9c/MFcO7eowkn8v9FM31D4
VnQJrKJyeY+yQ6qrnmaVp6WNllopX8l2q7ZW3+GwkA7ysRB7IZB9dmH5Yl5hMcwQa1MV8qGGA+ftjHOu

TyDg5QKTSyboRQHnqhErA0d1uWD66a8ZzHI6MTi/pPsCLKyWHVJPTo1pU644HI27d+Bln38plM3mJ1Ni

CFBUjcd916BBOdZ9MUje+qDXofTCZlECS+bjRU9G2V
ffZgxTS7Kh9AVIbT+TTrTo5f04EPJ6KBTtxRTb7RAh/zO/i3w8sSwvYLBYZKr1uqQAp2qoToPAt4IpUh

tavbPDfsbV00dJKyeLG7uTEDL4KTyAB+Di2RuaizNMtSdWuhvb1TuMphQ9aQd8OWumDTaDo/9TY1soWW

28Twik+D+OtJ8AT3t6zo1MBghPRh4yCKtO68ohyfEu
WLun1KHbKvKaZIv9WANN6TSBzJ606ogzoIJKjopx1CJm/19Xe2AyVDQ2kbU2yq6/65i/Qv0XgJsWZ7WR

yCM8YF7LNn0hcKE11cn+U6RXwUzVLbGGH8ZOYNrh999SY94eYntThvDqY8ebttKSBLQ2D5gPSqUKkPYG

3zvKhCrycHjmn7Nj5FvlzXqe2Ngq9/w406gQ7HNLcK
5G6OztO6xEmUwjBTtyQULFQl8cHYlYNaqf19d0SKyG7qZMeUe0YNqbYtV7sGWMFAJoa1lCPZwDxX76QG

OVMxJboEjFdf8X4JwBGgGs6Z8FCKU/LqAOfSNydL5rV6xQhzbi6JDEJ2pzvkfZwu6BgXhyrXjJxsd8pi

INBjO7EK8VbhZHwrUnoz/KSqIGI6Z6galdKOX7wLj2
9HfBq1jrA+4H/XNb4xnHlFJIxs8+5AXSWAC+pTKJyoFOCtIs85s0Y6T4KptMDbwP+EY9FM705xMeBLVe

JYOyxJLTrKjJ+anzK0hle9f8xfvu4zcaRWjmrz2uprP7ulg7Ixk6I50QVPK/30o5xqKhGE6K170xXMlm

D8wW3M6AeWQqYZrxYDuyUWo6vAwWh75Htqazu5/5p2
hg3DlRbIPgYBq0fDYfcktOvJovWVuVNw1QE3/M2lN/zAIeQm35R8SzM9o76FDvQVxB3PryGDgWAkNARZ

RrgVa6YQQ1iqD0/IdS3E36EgkrrDv0g0lAG6xF5PwRKXxwDYx3WM0HzY1Vs/sIWWjyknPu0jwmM+YZjt

fWE5qdtGwBw6V/ZHWN8z0bkgFnX5As08jO0o3LO+e+
z3w9bhZalLu3vwMi+rV+AQovTbobC8qMJhq4pRnyUH0PkQVKqkwIZViBqczPM3onVIbQMA/h0NQslDeC

yoAX8FFYgWa2djPnhO5OyKSpWmM7LWGlV3+5xgKgqy7fQWuk7p2nZgs1abx7qdvAvgzjkWto3CsHhe/s

TP97HUKf50EoQGshM2n8JizMzLlkPH/ay4tfY9usem
IVqikKwQkhqtkMAAUdvxR18ETEnP2QQyqNdUDcJOrn1yT7mExHBJUQ/hAEg7sir4DvU0TijhKKRgMjSf

cDpdu8d42KxqLdsFjfAoKSES/xkn1n8Kjxz8QJN17UT53EfthR0SqyV8qP26qvkTs5lCQBL97uNv/Qtd

rGDTNqqsIa5QBH+xQli2eiG9u2024rnWN/W3rejN8o
ADdHoh5T4zWmPSDp47vQa51bUu/jtumw3KK9B5xWQ9K/OYrwdUQPdmQ6tjF9vXh8fhmW5cE2s8mQqWsp

sMPd70IjCU8f9C5zTMpd0WY9KVHIdD+gPhkJCmkmYhCqOcqOUSzwjEb7hEi6i7+r68EmFtT3D51PyX6d

Vyit0b2iRj6CD0ZAtu9jNH23zK+Bub4SD63nuRu9sj
S+YNCoZPcHWicpL3coruG1BT1NP3P5d9YKmgnKe7gEmLDfDmMMeWIN7qAylzh/569qPDh6okGj3VNqQs

hYsvAMnQ27wfHAq8EpLHakagA3AKVSvkj6um9sG/S4vLdnH+ZSJl2/nVUD20vVA5El/MQEU/SiQMtvVr

cuoA59LetHe5utbRV0edSX1v6lsVqI//gtNLa4C8ah
ezH8AtS+dJXMDPDxdjed8oFRZmMw5O6t2swIymWzq6bF13JHYtz9cDt/eFBJAAH4ioXQGOqPj3RfMr6o

c1mrix0B3YgggO8divmzDFuFd8uWMhUK/2rbHtiSO7hILmA/8glk2WjUIFwlvItEftzIUBmH60lb7bVn

kSnkDFBOcnfjWRfe9nfYDm3mEcNbHiNDGI6dje+cfB
yl38blrYLf/uAV+y+vfbpQNhGC99knOjznP/SiFwUeYzJ1ZcdsCrUHl/K91G1T96lvluLIfFfiUtnDbM

flKdIvaUq8SrBlW42AQZ4qk8fTM459hVTnDgIx9eiiBc4B1/OtXG1ujSnMiemZ/wu8OMlRsMjz+5p7t7

oWPGuO021BCAF57RFncb8CiLtRuZEmdwotTGE340MS
xhoEvH8RiIexx/Aw9P/fna/te9HFw4Dszh7zXa3F+klBEQJc34p8QcImsK5hAwbLvi1Ycv+8qQovPRZE

kQePPYluIegJU4ek5EOD0GHi+TGx1eVdKHavd2674fwbnfXTUhQk1Xr+Sm06HuRz9endXI9MWu/rMbrX

kiD09zHwZfpa8JgwryGRj+O99lUQ7xarpqWQuU6tTM
Mcf7o3PvWtwct3qUJzcBH9KULne+Sj5rI0KKYc4y2JV8y9ktdg0+Gp2S1oKm2kQaIf99CG9OIARJ6rxg

F/Q6E93fvUdhNVPdJXITwjaS4ZfOxYEBATPpc2kWVcu7hJQZUf5fuVf2lxfGAb0XKfPFKKqSqgSqGU3G

g53yP8+OIjTl4SweBGyOzRW9SCgQh27Zm+FqAZ2XPU
etlfotBhnabzTmNw9FpI/2tT+H6wxFtF8VzbjtBwJfn4QRHnkzkE607NI6Y11mIRhe13EqmpKmEcoIIb

SKNLYddGNQlnzaDQw5avUFW2Hfwsk2r46mKC/Ei8ZB/W0cbQKaZVhKp7nYcIlKHXrA4VuiAREPxoHLeA

thjDJclDbwOhGV1cX/S3A9VkB+C1JuP4is0wBUaVfw
yfjf+q8wFPrIBHtwuDecwF+lgM/JBj2gSVDei5Zlh4VHWJVdsW0nEcGPbAW8GLtvTaxEUpszy5gv1bB8

iSDzd5Q0sf1bjkmzROHyD8e6gpdBCY75OC0sMAKnT9qltoJyWtRJ78emI9xCKXiuYc5BKwhEapP2qXFA

Zt/YWPvEi/PrarEIvS5mYz5LmQQuRksvzUpn4E9brO
5FUFpNWtdy0+WQPdYuYXvqYOK0zTzacTEgQ1HwmCc0g+3Pn2Jk0r++UlyPEuhILAoIRMlZyhqlw4268+

/vbTIqRPdecigbteNxXj67Toip4VucySRbiCReBNIDsheQPFSzyf3TVU29krxR6zZOx58U0rDgrsNwwi

buSfxSfe45eK5bwMCNg+BszcwL7Y84kkYoQZMd2MtP
RZ7z7G0nJFL9BTL27DybeA8aMvpVdlmyOW4zFLX4GGjn2Kxlk/irDFPbQhcJJlnyFu/v+/YmPtyofQJI

huETWo++mG72CRR3v0yUjRKAlKRtlfUgTkhd4UhGFw3W/Ar8QE9VfmujKgBKL6xZ90ZQTMl5pQ6nMz9k

aLAzw46/ykhZHEpjamXYn3V2xziI/zXASq0CXNww1o
Q1mKf8GyeAqr5PgnmWPsrGdZxd1tCEw9OVBctepmCmnmFNS7gHwn0rXVUN9B0wcTDD44wXCklYzWRcs1

tAGltAIF2yDA8XgoHetO+hVRdtRsC3CVMPuSU9aj0j28FIzvOHMaxo/dtJUqemsEPTl5gDznb4XTvRCe

f29tRcBSW+QlVlgIWIrbUMdRzaCWGYz5Hs2n3AMkqD
+0d3U3whsbYquB9AplJaN9Zg6ZDLG6qHHmyTynScwkK13YLYJr3jqhlcHJYtqmhx1vv4gzX5mUXfDCd3

Sw9WtDw0mLC22CZ+ES1ngrJMDIhGxaBEO54ase6+m0BBTLnybE40/sRc8C17KjDR6q8hjHn1NylQbvF9

8qkDvyDw9DlqctmJNlGSTAjzhkbuML2md+a6d63BBV
YKyYN52PF2nNQkyHPT4b0yaV82smlQA/JukJU50NBucQlbaAHxLTGvdDqDVRZeQ59If/tDwlGNwh7tGJ

u5d9nvmOIh3v42VSHc5qM1CQ6h7V5juqYBKbKkTab8vT+D+/G96NC4hl1FMi+fdpCkmSh91Yd27ZWa8y

ZG3+Hm0o75iu2DmaVLHu/rIGHNrmNJ3NNDqEI+lRgg
sK0pz0gBpnl4lG13blHH+2Fg9ugao5d4cWe3MJBU1FhWP4vdWzSfQSnCjvxLEnp7fnXnYVipRpDOzWzL

uUh/qxMeQy8uG9VNO/WGfFPBdJEs1EsNj5fCtlJO7Zj540o8kb+CAnkgeA8Mb1gcDqWB/jb4G/gajGa8

qfFvE3znzhdQlsX7KmwELMctJN50r4sgb/bhlqZb1v
xOHEiTkFsqxgznLHsYtnZBN6TymklS2nFLYFM0ANH8nLMVOT3z7CrqDl/ttUEyX6vLRCHHp4vxoe2DTB

O1tLR1K7Ox3FjBOhD3TXOLstCYIXBw6lfh5kWn3SL4L40EgzZxUPY8BFoAj1iWWdjgp49oB/XNOvfXZB

AzD6gqwtGCZntfImsFvZc16DP1hXyq+0xzPGA14pzu
PUuyw6EbtPK17WhZO5pmQ7/h88SD4Fxca++2Nl/2iMXH/EdYTFRrXTqAJ3CFnDRMOJe2mw+J8vwAJOwu

NgjfITaZbp+HMykqZQlPoR1E9VqL/ol1PTUMy/46No5Soyf0m9V5oKOoSdvDn0EvJgUMZP/os66AT6wg

4Mnj9/tHH2Ht/zuhjJTriWTwOF9LzOpgK5CrwUWGMd
y7LXKSV0m6Gdkm/6VhCFpc394XtC9oj0MU29BzPZL8haPyWYRxgay8VB9RlKMqwGasNFbch+69LcJdOj

6D5qTPsNvot/F+Pyl+bkqvS7nQORafpjE3OkL0GrAZCoND5tISQ74vXo0HffkANGOvy8m3SaRVNnpWLT

rsCas2BvohYJZIZW0gauntEIxkO/g0j6BM5psxFHC7
ab39pGrgAaeibdByJFGZtAwa1TH5y77eLe41dk7EOUYvWWSILuvobeLbB2fBR2Bp1OXWqc8zjAMy0B7r

gvpwBcuXZexxo9XkJAKG4xHyE56zPR0FDAKEwNCRQ7m6z7/dL0jMZlll+0o5CGwXfXDnz9QGrwvHRbv6

E1V1QOiLCW35tuxyHmBG+DQxX6E2Riw83UDBN3xYoJ
+wvrFSg+zoSYkz785pXyS00yKpflngspw9Fjadei3MVZcVrCs6Dr4NCZC20I39MY0kJ6M1ZmQg+LXAr6

DBakomSgElxoo2SpYkNKPsGHC57nbuxQnrURfdrxpQa6TiR2AI97yxIiaXQ1sBTbh1ldYxJHSaWQGHJO

hbb/rk99U4Zv7Bdqs1cUEGg2r5zEbBKOMWDx4MKXDx
Au5QwBz/O98zGe8nyQost7PCc8tycTJqo9DBeQ7kskexIrlbfHKLmrQgKVWfkuNrfgVm6wZdKtmb6AhK

9wnrrRXaR9RxcNpSafBu6AULVxXK8V05fm4FQ5WikHQ2k7x0rKQ9l5OVgZTIJhjBulkKyYQP97NUoshE

XG1i/iN7LvHCDfFE0nRyZWOaD1vdYoc4SXJnDqn6GB
KCEF2EnSKPGfHa3bCk+c0PDztSX8ffOzPA9CDuXpbrXNyuuOEEn9Tz67jEb1hMQHvg5T+6fjMrEeD6QG

+iwkRgesqLCGjMgrRNiNFFQN5MLkdGEGR59F13LxUF3JCmm8NFCmKHQk5/xnRYoVXVPMsqOTAaf/von0

/eXY21cEYH0TvQlGSKw1pHkxuJQFZZ/ebPpMB8kBK0
gfW7A0hfSZjBGVrCs1p/pVK6QuUfA3G9m5zuK7p9Au803IxakDenUkTEBe8yyGxn0pvOy3zImpCb9vnF

uAu9hQMBRloKibNN3KexB20FpO8y+Z7CfqaE8zyeVvXu+o0JeF2bccCqXIHHLRzRcioTF7n2wiDS/tl8

2RJfTECfIfwgHV+D41Euvz2GnKKViLXTJqwgTZuKeZ
TT6GoDTHdFihHw+nsKLlGyP6B9daZY1jkiTYGnT6bIjHip75ASq42uB+j9GrvzBS5w+Iy0+K5pRV55Mw

/depcz0ZUBnog0M2+UpRyaHqTpwBuXh+5rSv+TMb1hetb8S+McVLwB+nrzx5+QkkpQkDa38s47RdxaNt

3F7U7ULri6rwGlvb+WT2VN2g+CAJlxV0cGlRCXK9l4
vnBYJN3/piNl5IKhm3/L7xkylS7S/VCKhBy4PxxvdcvzdbIi1tEH7Ny7WIrXFYAbaGHDUt4N/CHznWp6

OQxAYaBRxBhWoVvgVreqdNgN1Hd5opD6q6b3EfhkjpndaUIvUeZJoVGCbek0iyYyGT+jid1Wvokc1uCr

5a+vDgbGuz2Cp7Dw2BSDTs2R74c6LBAb2lciFBWVmR
PWQ7C7HLQgD9cxdSTVXsRdsnGAIlGhCN+EXPNf69wL+ed7+SUF3S3sgJmgxL3Kk0ssu7zGsqV8thftHf

g/hKB8E2mCEdpoIg80LCdYI3DB5SWiLtjk3aIBwHaLr5eVt8H3ICKVpmtOJBWq149oBRlCL0IFa3H92R

Upl/TeCyY+W6+ZZweglrZ1cBMTqdghWPAtkxv8mSO8
Fh/+AwsldbSN4FKbY2YPFKMjpNJY5ORbRJ/Tmoe4FUnVvTzIufcmT5AV2Gw0Y2fFNg7l2yW5rN04+IWq

7VcUgbhQkq9ggpr/lLZssvIYHEEjLJlrxrTb42aBbxkLoyTkXvOttj4yIr7Yf+jbW8Z2B1a+sNotpyCV

ITUKrF8nwbaKyzGnWntbXc3/Ifc35Bt8Y8uLVfWcSn
sFTCMQovJcr8ELlb2jG1pYmSgOD8xRMSaxcQlaEBruizfa87orAuCQjC/AoV7C5JogLSkrZ0sa3aiqrD

ueNg3n6s7X9qZ+cmqgFsQjKtskb1rI5KkpS6dEhCnqnXDsR7gn4ezNoT56W/XpqPT2u89ueWMF96sqDH

OKYPFiB+v4i2mZm/BTGxR2fQrYG+hGy0mi3Lfew4QP
cXG6e4YoNz3Uz/PwQUip6F5sg3WFzFzZpl/s/hL5LfCjUstdpEWdCosLT72HV7uBrNP8CTzFq5uNZBPF

k5ThqvjCitFhka/jCZWzQsGoU8EagHzBgDEMHTHqvE1oXIdYInxq8ZArkHzSP7Ls2X/hexlKKyXMOt+v

nkCGySHeiMeu2qZ2nWqxORzvFe2ubBWiVi1IVD11Qs
d60cWyEql3JF73QkrzPHv/TpiH74hE794QIXvM5tK/SwrpBIqXYmwNOfN6u8E5ZdLwsn4PIjAdGsGDBb

4C3oOVe8ZSbQPrM+QhTxNFaGaSRwQTNcE3CPpo/ZuRRjs6eVOatK2+h2HgUeYTieczVe3oXSzAM63hzr

i06y6vSQ5Dzk5tdejVyG1ADX3DjwHSd8EfaGFse77L
ds3G6nZnYrKYOn/LGsHNgKvI58Sh/zs0GBlVu37ItS/hUDZPlb52aiwcjT63Cmwqrgw+Vk1j5AxYKqfK

qRAy9/wYiyYeVOZqW2imltJ3gj/sR4vtla0FxZrc78Wf1vZBlJ6k/5dOQaDD/Ls44MG3fQgz2aZtP6OF

WmOF2FNwDWa6pn4kXUcCUKuf/HLiNciMmbPOdb9kDq
bUOW/2cwzZn5P1ivtimYHQJRFFyacaBqLVV6uHYl0y0bNdx8ygiUgBbhqCWpuxY+oQjZyH6gthjt3NF1

4iZWZhTLv5KP+/gN2II8syLHf2WFstUNaOYKs3TRWX0wrYipOlYg+8xKrJU0WxwsLf9t8ocodzGHz/eA

ylqQoZJCr+dyToJVRUQ4kqf/r6kcXVhxwrMOOqz0an
9t7GushoKoiuMWctuk+s/dw0NLPRLimE2YFgL/rr0kijmlre17Af3Alum6lNYhziKnjlMIFqycqT12+t

NikmYcU50FxtmMtpPzF3ZYTIg15qGMzG+d0jp8PApm0Vhpmwu11hG/WsbLKu91vGQrmy8FKxEbITqgq2

bXffFmHR90lpE7nmhZ8WW6ngYdfn3TlORoBEl8GRP4
/Liliana+fLc2P4pY4jmdDzaINorgjYvp1AWpWQPhK50Sppo1HvdaxawgF//q2PeZYe9c52hKCaI4Cc6IlxQ

DEGoJHnF/nQXVupmDBBbw5OWdKhPh21j8RKMx9B+lgqns8n1fxl1IW3JvbpEdr80gpUgbAQecXXt515w

d3kH5QFsL78lRYyVLQAAu2Lh77EQwhuZHbNsGGMRm6
P1dft+1Jd/qc1NGTyr1m9fw94UTT5mfetpctZB98sI7nTtYClt2nBmdrEO1lgWFvgZhVHXLmmoudXHng

CBDOy1bI53h9pesuYb0XHrj2/o0u6jgjm0H/ZstNmrbdW9yoz074nFF7MSzh5ZTfx//EENRKj2kPu+bk

jjXRXO2LYQscwj/OUcGKonovxtFX//s8eXkELvlN8h
Eo2rm8Ur0zR2qXYTE8dKjC7RFZbdjq4Kn+B1zsT9/S4eRRtKkQw1/v4RJElmz4u3huUG1wblKIEGlmFe

fa1RixyM8u4Q0DJUj8sQKtuMp3cAzGKpssh/3wULCUfzFIp+9DR+yDBH5GM4zDJjlLwytVrJ7o5McUAX

ikHdQJYqsJJ0We0lFhOPiVdCblmfPqgdOxT4KIGyIQ
yt72KyadUY05xxseEvHOCk7GRVItRMNqNM+mTJRVJhCiO/0Da7AUFU2p1JVmafuSy6Gb8wzsOzZLJART

COWwR2EnzYiWaSb3IP9abLh7OSEvcYgROOPz/i7ST5MAMjHAZinpBQ47SJ6m/1aNHW3BYSvmFL2T8512

E3/VROB/o7Q9mfpYI7FbFewsY6C2ICzbySHI4YfZW4
IPCNpX8nEJsMb9T9Mq6mGM5lXhTsOr+l4M9QAUVYq5tn7+G67UL9rUJsPi52lW+CxddUram1yu6sE0pe

JNvmvrRl+gKAHtO4HkUvynwPQG2C2z2LQ+l+U+bak4fUo+ON2+VURlZcXYikaFF4VG+2U1j0WPVphSPe

BLiUStw55CSo5+hlc+E1k+YBthTsMfBb6mboqFE23O
apjEQfSH6UM3yPgeh2k5wgvSZfS7s+dJtkK2OMQZL5NbbY+Kb73RCIzhL3x1sc4qy6PsfSfaYiZUfr+D

aNpXSZzueZiGXOmm1FjsdvsrAOEGFE2Ye54lWd+bX0Lpid8Oon9mF51TT4MCOwKSEXECSZAGJFf1Wwqv

//P2LbYTiREHBGnjMbhlttxPUanLYUAj3IhjrfDgiX
VNsLmwqdQwI/qkTOtTldbFOhukcjJkCKSUGeIu+QL/C9Hy5lSC/NDGr5EuJYR0KYZghlJcpC+pHA8EZF

zvhv6FgC9NAl6flMkyNFh/qXMhY/JNu77fPD4Fei3Owx6h6o4zLoSV027nmfS6ZlfvJijTt6OvW7W5BI

byO0wf6IA1vmz94ysnrtOoAjLHTCj199fLZ6l5a8Ae
oNiSe15US6vwEEfuZhy7undhCBYvOs+YHVFu3w99Zb4H/F6mF1a98cZcmZ22lOob1+R0f1Zz72yA4kk6

a5oyiOdvOmxAzBFwdwhM99dKd2J4xurOLklCe5Fe5V/GA9Pq+xeZ4Lk6WOrfpKkLTbj4qaIJc6fAjFqn

NylEIb7qnOEyNWQ4ElGNN0mY7rdtvGIswoELJY0/1e
qgJr69u/Plzyf+mS0qGEVxt6whOTomH0Ad2StrPBMhTiat+EYiB/u2CnUkJl6t50ICQp58ebPx5ryNSX

03eZHSXQeYWFKj9kkSR3AQ7whqwJxHuhfpg2fLK6l9hg/H+6X3fBl718Gm2hXt1+41J7MtxfGyG8yRzF

YSN3x9zDMnW33DDIpgujQDZVp6Ma9HM3ZucE0cj+a9
2B2TgqzYyLJBu224cLEf/LwDFsnB3zbRFJNg+f8yUTnFRkVDtmV/GG5DiGLPK3DFW/eJAI+HbV7A7IK+

VBVAL/Z6xJiLfj+P3L6p1Aiu4AGGg3RZ+UTNtBR6EHC5fippg4LIbsF2ocljhCcJlc4bHmw6qsXegiAI

e0JgTb6hNN2ioL7Ul7D344ucZRIKa5rCznz+dM/MrP
YInpgUaP5uPpUjyKl9elL3kYXtaqEI7smP6W0Vfk8nykRvPmF49NrF6gW+a0RdTY7QsPraDJLulY6/Rz

HTSVAGfPaW7AW2Rl30X5ldpNUO1uRxLlrDE9Mwe5tvn9GIP4xw/Cwi2vsR32W8zHnADCJj3iNuttPK4V

FfZc7z36Lu8vHr2rnuKbp9ho/JnXfivp9+QJH2f4Ir
rpjqCZST/aR1Weux2W2GisqFqOsB+JYy6DZ4y1w82uD12xr39V2fmVRxp3PpVrv6GwnxdDURU3wgZ61q

Ti5amQqjTygDOGZ6bRA9pzgU/SDaiYb2WXxoax+8pkkaVxme+EpacX7PBe3y1RS4dWxcoRoIadWNXqox

bN9SdigiwyDRZ1LWSgKdJvkw08h+VSOUBKVLNMZB5J
N6j4yLq7bpvCxqmtPeBysBqXnhQzRacuhfe0TK9svToNhN/eIhchDFmz88wWqs6arTsuEsaDIXgpKC8w

Y+LRQXL0nfUxqYgOrEPmyyVqSZEBXjygBTate+dP6lt4XVbSoWdiIT6NX5h1Y2f6tKIgczTBFfxk0JTF

Hjfc6Fe50wWMXWEA0lqxcSbaL/XCGhheroT7mTYj6p
BzPCy6JLCpk9RCKhfEtGNNE05rXDrE+dFL+N2u5YWdOW0KWDj5S1NItG0nKIsbY6dVvJ7YDzqqUWBBPX

fPrPzor5dYz2HOHZOO0HmuM9VhPETP/qM1/5IFwWeQ8EycNeoiDp4B/5QuHpwLHMGyt0D3CR1hj0hTK9

mOE3Y9s2qmNRqUriqXus6nVA6ZX732WfgwCsnP8LVG
VsAVt5BLcPsrT7m6ajlQcL1Frqq7/b8QUaCejD+bnHw0KHR6U8uFPCNw0PMZJC/5zLv9eSeSYtGb8kJn

OJ54d7E1PQ72mo1iX5Ir5s52jS2UV4DK+AEuL7MJ2CmjCI3flfGf14q4kna7QHVoNesnx7erLk3pnsqT

HNVUKEvMzqEv8i8zGxFv3m2px0s2SoIUwkDFnZyK8W
Gd8RI7Rk6MJhd06EQoO2DrFbAwPvtZrsX0gYfm3zXUV+CO3ys1nWOGgTnVBC2gH+CAgmggmwxI7Q+fcv

j9yB6Iq31sX6s5Ym98546z6mBlop7GvFoSq5VNvZUCr2AIUIbHDa+vAADU5A1m6f6rX9o7+Ez9Z4ZeYG

xzJLlY6+oyHktjyAwxOs6h2m6qzPH/UNw75h7oVBeR
tl8RDPqyFW9HxGULXUQfbF3vC7Mstm809jkHGxL6xmbKmMLuixqCzcq8LU75iG8FVlGLR6HAjQ2cWChB

v0Hyv727xkWpbcGKqI5ZUmuF/wV5oxsD2+AW1HmF6U3IBOqZQU7azsACG2qrSOJqa+ev/1s9hZzDyiqQ

C8aght04AR34me28h3qzAe4UA6Vgzjc0IxSvYjJ3pu
d6FezvBDAX+3G18E5BZNFf0Pw2O5jVXDV7i/MCcFmCf2LP5wtRi25yBBOvZykcN08yRbmq8RDm5VJp6/

XJg9tCJ8/jnhS/Oy9hHt6bSJmFGmuiU42fqjrJ2i6ndZ5Gn5CB3rHwxjlqwefG1Qi6D11uijFoEnoTPe

CUAiWdG8hm50L/UgpTw6RRL8oB8D1WzWBO1E9CP/9S
C8vTD4L/tTI3Mahzi5jifiFCDzZ23OgUvLPJo48fouc+esMOyOD1V5RiahURFsdsYj6sUl6DQKE6qc3R

qT9rDcPxViYOSV42W9Wkuu1ZUgicVdsR62W12S6HX5CVPh9XJM120bl3ZExxQ3nOJb4je9nR+2W4DS9s

OG+TSyLtY/UMIVOUs/0lnFeRj08HRkBZdx2nqxW3jx
YP8IkfdCOcKDJ65rydWTpzgjVjuZVQTcLioyUseWOLD1JC6Su6ncxH0lDAFC9f+6QDefo7JiB2N+4kiw

Zyyb9TukFWIN/Uiuhx3ja7f7mHme1P4BIy8lAohGYIryUliKR/olOgZIG2JJnYJiki6vtunPbiwONGQY

Xh7oZleo21Wj3LvQknOX9BTgq5Avjryxnbq5Z1tWfG
zzy9bcQvcd2fEcMH2e0Sy9ytcDj8iPUm7cEhjyPzsnd/weIVGZB/psJ1VdkPF9Nqk1U38mB7jIH8t1+4

Q4eKoZNBe5b/LfJA7yYb6++xJpIk+wwN3lF/hIMhdxOrj9mS+9cXIyPhyJTgxdDVwykv4wBTn49xaDxP

kDNjS6fXBQPxUNvm98le97p6+r4oaW7Kq+XCXtBXOq
EZLmTulEYs76yo68NC0S1Ww4aH+IB2mWA6wdGzlQLQcbUfs4KP40AofCWzVGbnn2zcbZOBurq5ZnNSlh

HuVDva9xATU4YX+FpUDrpHazKpBEs8hsa9tRUqWWRKM0BQwZgb6jBP66DeENgNSvh2gjDY06EyvexaJM

tDjzlvEkyXto8hdZHU0uyNKsUkquGUW+42PqrGL5d7
bBdfANz4Mwha1iE0EXW+NaSRcWu5Zn/pzZuh5M8xPt+GJ5o2XvbLeh2/gOstK3+7BZIzchOAlPMTlZiU

BDylZLzgufvIugIN4VOMcvnBMcVpVPq6tiSNjyXjo2FROXzULm36qHcCoj/LDlDsVdkie6Kr+hI7xajA

aSgkYF5MZObdN9YrKanKgxBJB0BIG7R5QQmRfbJyLD
YDQWJAe7VlYGthDm5x5+pP7FdPh7gUReRr4cr5AXAOTEcyBJSiHGjnk+V1PrBeOlhwUxpZN5QgGLgXvX

VIE4+5lNaie6LbqWx6i+Lo7XCQ8VwE1zrwu309DqDWYYYPI0yosBYHbooYUX00V9xoWBxXe12udM4mux

4PGhvOg4PYlGt/vFuMXPSHxMVC8AfoxUyOgEJg0Cun
6Bzxr5/ERu7HNvVxKAj2N4kE8s62io2BEhb5Fz5gRADNONHI4/LbYzteMNM6QQsg8OAuQhYybLVCKm5R

9jgI0gMFHIUU1NmMmZW5MYUES49HLePgPJiPMP7c4GSSrqU1bR8GXI5YBaJkuAn8RntHCNaNPD8aSQs7

DwBgwXntLNzsyDg4JOX9HKu5wp+bjyh/q06eJUSKpd
2vvUM0JFFD5ZXObHKBaITsEEJTZiQmdAoXpDWs81RHv6jvZ+XqRp/Nv54iAPQB8U/7sJ1ajmYGx5jfxM

AKbONIa62IIBlqw6ll++MgySZFFBw7xTtfzFjKm9ebSHErm1UWjxgwrRVAlOu7h8umUt4y6n/QTnxYfi

vGX7OalJ2/FZi0X00kjehdGO0TZO4Uk7iJH+sqk+UK
jTjeLqg4dxiIYX3fUg0akN+ISw2uro+e+TckVusOeuZ2hQjQMjL6iElsdF9EUiO8kMf+bGbJnT2L+eJZ

KXHJ9hVNuxF7ozO9rhXIZy5XatNC5K/Jj103FtwImpKiOVrQ1rhQiT2jSmoqpju4CseL+THCVNQhCReJ

C79tWaTi7uBJqOuItFX++ULfYiQ8w9W8t/IeMHAgkG
Gxsj+henN2xDltSOdp6GeVNhYyKU5e6BxPoj3EHd5heDrqDdap1LtTCMmMMfBhFNPK3HppRlg9P1BKl+

5/E5JNpM06Hbxcgp/1DrFmgx+HBcV2YVwgvF4wCg5anRRXqLSEx9cnc+nj26qu4ik7d9k7uKkxAQo6de

ixA1PKruUvoFEq12C+sJzQLJpVv3HSnYQbf5YDqw0T
BVdZk6VA5zoRi2QD34aLwv4b6UEjg3/IWxo6DOUNpaSjr9sxmSU2apIYPmckcwKBCWe5zA8+DVws+c5r

jare4eo6icdoFS1TccxFxv3tXaG6pUbiDUBG6v3fogOqwVa0EQy3SlQtWBKgwdxd5sTZjRCjIPM1CEAb

2VDxyhPoDQnTB7pM3KhJ9p129PcpIwIY70cWJ5Qli/
EslRsY1MuBZqJ+vtajQ+PNMBi338yzBHyr1H8PGnLjBeXRV2jGtN/80lYJXsDOFMYjyhszo5A+nOoS+M

Pbl1kX3pbl2PO9t8U5RnZTxOrPGUev2T+nhm9j0w68vEOjouE859bGikO/d3hZsdmo5SbWH4pxB7G1Ir

ZANx++1T+c576XfxcXPwcqnNFGko8/Cj4ednr30Ugk
ihfD/zRZT0ya2cFtbXxl9wFVV6f/NuNDLLAdw0ztdXAAjDGnGkh4QCv2N6v4u3k8lJOW1gQzDAy/dyHM

X40tcbJm4Q/scbGAFD62M5VHZwhMyuEyI6dksbHhXY+hkMwMo4WDDZpTRdjggn3afvRz8vv8w0Y8YbQt

Kw9o8Y9szt4uhvkIyLVSOsT0UXzoy8xb2BFrkKOd/e
FVmoJX/zopV4Qq013JuZ1bgs9w0MUxIOg6q4XtKeVrrQYLZDDC6mWi3YXfOy+yFaoU8gxDWxGLHCwxwz

5jfK7fLaPD/fu+DXQBIrSx5uYJxgq6dSqpVmTnwrsdo5yMfrxiyg4xwEl167PPy4NKJu/vynCpe/lbTg

17Vb3SZEJXihz9qqASYrTdT6nJHzEB9EiPVoKMwFKP
vAzo2TV/tCaih19P3EbMpRPhORWkic6hm/2KkM52PzgRCpyuMl2+TVgdGLR/gLclwM9O7it7HseEBoQa

+BYOY0qq/DwzyKIeppXQ7NSlPornyqX+cpDCZLlb8dPumIXgwsNsZ9e9Qygd0QaR6dLiagfccYOWxF89

YntscHv7NT8u9Ts4Stha09X1LyqmFRgd1A2R+EoLqM
Aj9UuAjUdYU55NWKIsmNWQF2nD66rjeTTWshQiA9R+2kWSJAXkyU39fAB66YFKqMFxiUy2C2fripwbr1

nCMTcyOlqUQHNyHU7T/phAaSQRijx98NT4eB+W+RfKrf65vteaDJHqkfytAXhRf9MfD7HpWkVgd6xgFI

I2QPlNrd5lcI95MA0PfwZDhDaf1Aa3sLAHjo2Wa9On
zhZnqxD71s/8BT6rxkU1i6ZkeFNRE/yldhM+g3SUjm93NqmWBUg4KWS6DBvU7/eyADIBOxttp3ukbqvr

EUJw8n73LWUG4EiAIuFx3zU05Fuckd+bDjUeXSLlzCL+pAF2k655+HmX/1iUz3K9ijtHfbhfoBKIP2wt

Z/zD2xpnBowmh4XVXbu7iuhcjNWD6wT+Y60nnznBOX
jcJQqTW1W+f6Xqkc6WrEoNVauKQlHH5IQbhffFTVnjqKFJaVe8h037rT1Z6jMSWmwW5r8btdgnsPB4QT

8hK5dMuzkYwchzZT9slc5K/A7B8j7XT6GlfE9MZuIyk05NPSBWUfIHCLmIh+jzrhphBWc2VTFnSkao5M

1fCAgbCe6JS6JcobkOBgFaWo5V69pm6pKsTUvvmYHM
QENhuaGx0pXZjyGbi+YTDBsK9OVI6gl/+cx7GmW2xICOs1N8U+f2cgt7c+qvKYQQ8nK528DJEyJeGdDB

L1QwvcS9aEcZ90L115ANATYIn9h8KpBNHrCrDAywq6xB8W0BMMuNboGETHzP+Fxp/jAmdgFBrmi5qFCd

nqQ9n/S7vtoilgEq5r22YSJkEQrGfshyJEuKOzwx6w
Dvd6J38H63EcIXrtkBe0YCmf8llvJv/aZ71oGVKwhi8H5L1N6bAusungdcSN7oyP2gatYXAbqnTwW/yf

YRL/P4sUIaXcxRxboyk4Xb8E0nkpprPGZIfPVJK9twPi7nJn9bRyPTHRr2lrOXkeE9U/c57pYx5GNCwa

xHWHG5vJn47jqoHspzYW9Is16mBk6QMChsUVOuQ9pm
03JPQ8+duf2VQ/i7GFU4ngDA7b8UdUB9EUXvOVjd+m17OSi9XRN+WqAL9Zj0sc9uhHrlVlNwDC9NB+/C

S3Y7NYtaF2qtjTgmF0j+iSeMUeU2cZwEiZc+L/qJXdScCn58AxIHNVMxHIIKyNKWHHHqcYJobIUr+ABO

fiO+D4Q3gpt61kEzmIFT/H59in3fjOToRhO+evnUpk
0o8/RzKLS6ru34hE+eeB53nNJLQyM1UnH82Zd687uHrZ+XePoIoohC1DtSi41U1+DnF56zJFeryvjVrT

Zu7JJ/6IiNQDgeZiDAsAu6Z35jP1IKE7LBReQTW/HnntzgqLFK1/jQOxvHewQRckRHIqRP4ls6OoYGlC

uNT6noukzLMt3+8KAH3CxQJHt2BrAaQHQvljowYkf6
4rY+MTNGkp88pbqMKvpieT0ANCQq/8R8f6nB9+Gq8/+He75dps2iDqwY/sHpmL0g/55IRDq1xEeCKm+h

4hnYPAZ1P2KrwYWJI6MHxkwwcL5tPV9g2oR57GybvrRIqNRphh1crfj5QGoA7DO3jbCJUSEA8RPN4PX7

n7hGsjt44maks8DPX9y9CIC0WVWFBKMul/74GOe8B0
qcjOqSXwgJHCjNmd49dQuzywxxIaS2SBiFhJ66vMTwHyxuGm42saHfZJ58sj3ovi9pOxdXXLAvatW4TF

T8IpB+V/Vn5w3RgdMDV57PNeopEMqeoaUTYEki3sUtdp8i3vee4G2fc47bUUJa/nYjLUfDS5Q7LEhm4e

Z5SpZ6tlvXj2uVbqbyA19pz+xD0jrsljCIcHMM31y7
+UtcvBz8uMPtkFG+/WJECFdZKKOEKRXJUICfjnaO7y0qEssVSQwhDNpqMQ/Ldg3qop0EowdwYbl+GaZr

F1EiN2uUFUvmmLT6PLLVj8wJ3jNScqYZFM1+czZHGo+VwlsTT7uaW+7Ly8LtMMq9y/hpQiPzRyF/vyI/

9Vd9T+YJMsdsE0PR8XKwfpYQc01fplJGOIhwdX8Jc9
n+C359hqecfDNt59Dhn/aQ3OdgZrAWuO0tF+mn39V5Mnm0VF8gVU5gF259BAiMzFS2nT1IWwLBuDOcnN

KKtWWvUqrInQCmAd3Pa4x1gu8/5+GigmCma93b9LaM3dTwm7E3v/5xGtuvgjupP4qPRhZgfUtGgxrQD8

WEYGxAG/pSvmH9wkXSlXGDB32P3iGOMsxsrJImofEP
h9esXT9XoC8rt5aEtkNtYRzg/FTsF9wOBCtDSnZ+/Ozf7TKjjDm2vPBO1eD2JTPym5pr56w2zPwApDdE

202SqhXrS2yWBOLxcglzOu+0aiSJuS6K32uFVEnIIkzbwHhggiiUgTpiZ146ZCYA1n2PYCgwoBbKqT/+

Klc7Qhf5nROX4inZKGCc//1cebOl6FisMdvY2ghFym
ZQeVFW6J3IlzcdlokF9zaAirRpKn8uDl7YDuaCGvXdCa/9T896/349QIoFjAbZKvAwHv3R2dYhriEc6U

y5lUhvylistrdgPU5z0RGBCb4z77w0xFnRAA0woYWxEefo8441HM5/1FikDP41emW6nojSqMGqE3uXbr

rTOZdORqkIn97+oHut/CDBarlNrUdFPZNls1XK54k/
HOObAjaNl2anUQa3nyHhm8McO04OAZmog8RVM0qu6ChuyHBKaj5AH3vMzPgvn9HgQZI7H4YbeDsDMBby

FFQaMZg2q54XXSrrjNWhe7i4xDBGDJDCOleE8xz9VFggDBqB9wUI8a3g7rLfhnaM5BHP5xRZ802vOZmI

040q+lxwIpy/vAsgRpeEXLalV9djw6aTgTdFT48Kuu
1vS+zDy+CYm+zgeFD4lIe9Szc2zrY9VYxNImRdjT72CUOEK9/8N/HEtbbIJsW9KStNcppYfAOOMp+5qs

0rHsoR79cDbp4JWNs7Z55My+vnNrzdD8yoaeNgMHaVM67kmdInZaJrKPNNUqvy72TXDz3fuxZhvTs6uV

lx0zrIqoGMDOom8YpLAqp437GupXt56v+hb3t0laxn
rMRbVnIgkv5VpEpzDwwgwoQtri7Gjmh6I7XSPDdtHwipiE0eNzJenAZ2xXNGNIrDoGMN7eyr2HAJx59B

s17GU2WjldDmHcE4Tiiocps3bek2DdHy5jQhPmyauts1d0ZIsHznwBXm5UhMN1fcljY1Q1nPAQqqf5Qi

5S62ajc5JvjX6uQprLOdBvgss3dMph+eDVGFxDaX45
qcezLSUx/LezTGSMzf6bgV6LTrCiq//gUlY2Vkg8MwdU1XlKJmNb3iT7xcnsYX5UzgQb1+uxP8cwHA5W

HG44TwQ83griZFoJjhf8l3R2ZlAggUsA4Uvm3LI48cvPaagGExiv4NdaX2BPfyG9AzWNUUIr4xNOp8FP

Nrgb1xl6Uy7Qn/kCCQgJbypSqrJnJB9oZ6M7oUXRQ0
j9IGdsXn28o7/cEvqBqqlKzd3e0k2wSOwLlfeyajhPP+qjU3yyTuo5sYVJ2XQTmjTXF8MP2biELJJsB6

njTqujai3cIreXqoFUWDdDT2OZn6yAhZKFcLurdPrIcH86j1z0BxSga/w4lo3kN6YKRdFNjVB71qr4I+

W5PxukIF75XDd7qFMFWVE+bY8KLoufQOe0bh+0Zfb5
xxB195gt/dci6k9a67paGqyr6+C7DwGXmhpUeeaHEq56IjCKn9Y/hSgXXueKsBZ6wL5297udlR8C4pyt

i+uugh02sQ8NQtIbnK0uXS6+iPSDSAUb3Erg6jTtoY2nrNXZyNjcPsNl5AdpSr2WHUXFYsVI1tZCSQP/

LD6LXg8XvqdeyfGgUx5xnOMUSPVdxXUeOUgV7mo6SE
uKUlmif44N08iYctfY/KfpdupzZgIylmrfBWJLImjHc8YEe0id2wpcYRrZGJgfKlBkxeHehWyZjhPbp2

GluNaKnoiyfshLlbkjoesYV+PSsGb+UO9bdFb0yIdntQDahQhL8hOQacq6M7Xnv6+xk8+DdXBI5oylQh

kKUrr73RfxnIQ+SQ/GDXuBcf6G8Qm+sBjaPfeOw0Zb
1sxkb2AHt7067N32JWtdxkI8gOSIxsa0M0WdONbBmIoEFVRpjI29UfBGiAdNZNdEAhLuOoIMARhg/O2v

VaLylHCeI8egGP+UGoZ7Y3LLPmQy6zbFiM+webGphuCpN1FwiK4GdpqIqIrnR3ehplMqzFrerJPFzrgJ

otlxfdZ29knF4spbjlROdvZ1zcPfkiTyCxORbx8InS
MvAeTUtCPf2LQLS2BxrGS0ceyAIcmM3PHz/pv+JJRPilZ4pQOSyoffcCVGRLGRZtXQfgrYzdlsf7zYEh

DCWDwQ+KO/kk6caKGQX9NczYn3cLQoE/CeWIgl5US9wZ62iM6ZFXJbu+DWG9bqSp+D6Fre3SOMvpE7+H

SVcG+D9DE0p9+3a7GCq5p7Wke5VQjNRKwly4OI8Ym0
8CllO205GBqUtVc6vr7Qif+o7xrwUmjQyw73JvSl1kVWABwQ9NqmQbecn59ItNHdp5AGfzuodN+gHgnt

Kt7wkDPvYcq+nqg5gB49kUwpZj62Tb7gfWULc3oejt6NYXQychX71Hfz93+mgv5VN5nPifRT3AtrjMbB

fyML4XJrxmyNyu4zq1SwzLYYQIzFYeOxc2DamSyzAJ
/TGCQBmPCAkwtG9C+C8hU9kOhk7+ZDfHnjd6M1yC8l+bddZwJslghKks4vb1x2qh4plBIh2UiFWCjmKo

cMwVtw7+wU6hhdT8hERywhoF7qvYxhbrGhL+H2YWQ8EmNx+9RcR50IBC1X8g/eiQCbVgaMaRfDa+OCOv

e8jS0XyF58sv/zRu2Y+J8wQ0CopSjzc9XjZkGiLgqF
eVtK3i2Yi1sqXygsTbmY/+/AA+2Or0/szDHRW6u7Iq+pG5DiT/nW58dVh0qHKoBbGLH2Utb3u85gdbUi

rDmmE6XZ3fR0LJty88yxSemd/W9jjilFocHHU9TOhplgikt6M5kvDe4XoMor32SwK1RRpgTGQf5W5xyI

PJbHrmzz66to7miBxXy08Gp9OYKSV6pTkYQfjKzKfO
zoPfyZJAsEnJkbd09exMcUvB7j9oF/gvBg6M+6RDeuRvLFreigMPqlVyhbe7mw9TEFPIw+M8zMZJKuGY

dYxOekxun3kQeZrjQrgWne84LQ9I1CngMNxl4K4deOqwskuVUyVJANapJginiBU+eIbQAeRDOAhbrxjU

qoLShRzf6haKHSrF86f+bgnEEOJzq3FTtR7+TLgNz0
6ffyP26zxa+WPiBQaVf33n2wmm6QxH1ywyfFoPYchB7mvpt9tOJEv80QRHXsMIWbxg91GT79twbqEn08

5AZidZE6EdaiGU4D2zedAq0X32LdJthd71gKhb+TICIT4E/+LOWELL+NRjtTqtVpyAc2s+dx6t6GJpHS16D

gFoVGLlu7yN7QYtlzlil/rhGgiMgR0s85TFtwb1nej
RdRria1OiCmaLM7OZzJ3w9NkzAt0YMaKsHtC7H5C2DRKouGsifFR5hYgD7ANU+q8zRVgwZGRTxo2cq10

37SIrhavcFqAGjilEE3LcpkxAU5+gdTSA9fmFlknGIOqKhX9gD4N6InMaYZ4d6gkdvJORt6awUAnq+lM

QRD3KivDwuiv72mWKF/xgrtXOlt3lNbcodkS5+42WL
CKxW7AqdpJCUFCMFupbfNu44hDEit7U/iREK4rvrvNakKJgcVpOwvhSY8pKxX0icyPiQbRqzGE45O4lP

fhrwjcsuz6WsScz20RkOB21MVDVbuJ7+adq/xnGoFOAOjx9Yx4WH+G0Yz38aWajdzVJmuhLUetb/Cf0/

b/gjBFWBo7oZmD72Mpdbq/bVHMTNNnnEqL+WI2hot7
T/PtazWInb4MsiMCwGXGdWjI8PBv44O2FQn1Rk+T0BigDvAV/CPBzpTmWW0XrYC1ajGSnB4dEH63YLU+

YOgvHpIyglkgl9636QrQGj37XpPBYHg/mj/6JpIGDgKTRXa9hmQapPnIAQcT5KJgZRUZsGNwZKhpX+cP

czuLCUweQBE96CyAqlgBT7A2ozJeocyv9sYtgEHo3x
fO6VJNIy2D6e2AfL/zYoEH3GnTpcNxDheY/Ki4vslroAAT3BoTTFDx3yU3y1/wBU/rdQoV4uOfq/BP6F

TpbrUEj3C4DaJCbvQm0F2/TWFlH3KxUAZoXoq2cTBKL+60NeYuvA/3dB2ZI1uSiZqleieCQwNA6kxyyL

ddT02QYzsbRqi1UD44wHYZX1cBcKxuplJ5yzMHg2e1
7jbHIkCKQmwWVfVw0oF80rfvmhOIO39CDdwD+y+cnKtgCiECX60qX37Q031wfdjvzziWLYxxnqlEHSod

jH2fFlouAaEdcRIDCYPVn2uZagbyLqGuh8HP9fKlC1RNePMnzE16yhUCZEgRoCv8QPReI6bWM1uUwOzt

jkTNBW8yG7/NDU0qI/8vVc9hfYcPv6Sn8cbYH6Wv0q
Tt+RY7LWVt0inTDsUJqGV5cbrmQ5oa5Gfdf3O2b4KiJHW+skIY5SgI3NGmE4tvjV6ov5q+YKWyQsnyYW

mEcYKMi88RAeDxls/u9s+xTCW1OdyRkYJf2ofjt36VEbw3NMitpJV9blocHGyNeWeGF9rlPxncqiZHUA

RRxsEXYXqabHKmNske6iL0/6cLuTVzhaCYYtWA/j7p
hCDrv7dMmi2jnaD9qX0uPZymQruo0QGMkTTm9tBP/iwzSZ5PxtjYmkZqwfNTrVnKqhntVkC9L3RHs5iu

xqY7xEYnAU7I6xnu/X2t9KUfBoujJpmZ7qPsVYUJlVk/Q5DLoUE9xLoLMjKtJ7445gXP/aOlN/kRXJLk

gps9WAs4Eg9KxBak2/Oh2EE+ble6LxB07joY4caibX
3t3L7ojYejpyHeLJSMWG9Iqhgubr/ek9fbFN4caNd7npgInJw+LuDglQihUrULxIS/HiUIJDKVAsWIEW

z4cdQAsiOlgjUsqiGPsA7OF/Hu49Z+Y+3Zkzc+c+rNc93+y11k/7lK234YjawdfxvXQHg6jtTAcV+vaa

SWG0F9A6TEm5wl3W6YhAZ/XvjX1hpjK/HIKGU6yEiA
lxqRnBpiYSttkQLYwD8qBsbO9CQ1iSnPKTQv1qKqdzBFmvnM5S8pMwwY0sC+3JgUpT23q/3caMmggwSo

izld+xJOTE8mdgHUNF8WApbw+sT1rbZwSa/au+ta3OMb/YzEf3wLBNaUT2P5otF+EKdJfUXKJRJ+9ndL

UKpYMaPn8GItyZ7PpFjQ+F99sxNpX0n1R1X+HrtifO
7WB842x/A+zpSnDqv/hhPs0yg578mNe1AjnwHs/N/kilo+zXMXTzkyPS2ETh36CDcswmYw99scN+ar5mIx

i4JVGJL7ZmkmIFNJz2qomPeiQeqxmKAA9FNzNwUosudPb98Ij4yZwk7mcRjb94OFhIYe5jtxE5Pa//u4

3i6Sk1mZMeLWc3kldjWFEHa1ogaANLuxNGsVxK5c7m
USm6gPCLwAt37wm2p+46KppVX8loAjC6nhe1pxhQgf99J2vffFyHAQHLketHf+APXI/dpse2TLoGu5no

zBHxVbWdtF2+Z8khZ+4H31evk2xzxGMLbxLWvA6cXgYi9bq9klFL8nPmp/xcz+Hy+TzyHToehDxMTNof

TZy+wDKoZXzCPFVqSpCNRgnmdrmJKOAIY7k0kiDe+v
OclmFETGPkza3Hdgd/0N7fIo2Mt/+BxJx1rxYUZ8KEIsBz06TRDcK1CF6z6cKHuOb4cpod46hO1THnPS

IV9y166UWDD7LDz6DldTxvaws5w95sjGbSnHC1SyUyhvpT90l/V0BxtiLZ74cYgOEJQ+CByYvPn7JJr5

3CCw75mTQf6rPVtWZ9frSvsH+qo0B5+1rc5AAWjb2J
PS51AA3tj/uHScviPLeq+lZs1e0dH3sfjA4aag3NEBhUP2uXHlmHC3ci2dBG7BphmCnug5uodbtlFQCI

UlcMMnWKIOln96stPbelFkNAs20BpYH26S3HZQhgV7oM2MJuvv/MaFSvnvYI6vKm0aHyZBEXAkLvtN/A

NzKb8DAsZwzD77sqavVxUrYZIUCXw5lxTQfc/F/6tw
26k/8a5ZG5OaUq88KzDCkMA+oNWXYlziZbFV1B7YLzGzp/06yf5R9f5EeEl/xSMEKP0twJGyPr7SulvG

7O/5dd2TZFTO0EO5f9pV3hk7LjLGBE9fYcusJ1O8qz2qPIkT5B9l60CtPmB3UsGY70n6+b1JLLGULFsI

fcZfxQ8D6HnFXVV7MvtpucxY8kb69nBJbs0395ELrA
3ToYJFZY60rZ9p8slRaPSwltjOdpnSdcrkeQeobqjcGmS8Kz2h9EFAvM5CQQSTYuhinX7aQ9aXXSX0rk

nd4WiZIc0UhlM7GVyZkhAuE8tzFuhR6hvrlzY1CFhEeIVNQaj9L47RDzyoYN2r5jbmzLerOv7fZplgX0

nQPSwMij0XuoJKhokX3A093bC1v7Bc4xzROLsWZ9na
GbP8pbbq98RiNZNTXWVPOZuMDeVdKvMHKbWuVI29k+ijA/KWP77z2fmvWR8z0LB45csERFVrVbCOSDTo

PzSFE7mqpzI7iaCe8PtoaIabGrOy8sUn7lnB6PuIJ7dJN2dKvthMNK07Gs244Ra1a+h3JVMqV32Pt6NC

HpLStUVX0dq2is87fmK86tsdx9GB+73prG+ib7otKb
r0aPCwwR66CzBX+Goi/+cMEz1xkjr1MSe1BFNTuweo95Y5+fSj0aoIdashN9HsYUEouXYmPzln0zQtCw

M+gyfmJ4RYBtNwhM1c7GM2i4VxdH68Jvgm8O8F4G0YOf2HgGW+m1T+h8xQriHRa7O3ueECuIZ30sCmGO

yhOr6W/45dXmpDb51mYoPolQB2O2NgxNMF18ZylZ9c
XL4/rb7SNNze6c/oBwJrteU6o2CuAKlNXsODkGme6wm91hGE/LyWMArEMC+BfEXChvoaGzLPai6ViU9n

zoVtVF+ucFQjYpQS5SP2SoyeBInJKGCPt5G7gGz13cXpxfeaVV/xvOGVPYA4VJMxFp7LlUt9zaFIBU58

WGFiufTWQFJI2ax14NJBUc0/yJEcJHL/09A86S9vpg
8r348XO+MI6H7gSJ6O2MzMv/rhS2M9Hxqrvm7UESCXQBFEYneEmNlCpfvJwEm/1bf68CJLTxfIV6a/Ih

s46ITkvawBywrrt52v//rxTWutSpTtQKCnu9EceiaoGcwcDc52/e8ArUapBjinJzSJKWRk/EqRbn2/xl

z35enO7sz12i6zl1n6BiSDWNZoU5TRskvZYbtlHo93
OIFtOrDfj/bHmIQQs3aEylzDaXjDBe3CYnTvBITHTmnZSee1XH+0G76fXc76wWhw1OQFr/n0Ek/Mhqyd

3TbDOwKyGuDTP+9D1jkUdkrv0XGpy9uxzQs2cLkqBmIZD6lE9pAY1UUewZ2Mh4zqAVY4Aw/EUI2MI9p5

xvHk34HOph9F3vvG5HIugTzO1aixqhRwvmHcyPluVi
fp6lUwYfGXW+hTElEwAfYfgKIT+91OTTNEfgy281lNsaZlUPDPgy+EpK9FDMC3rBSPrwNeZv4F6/tj8Q

nPxZhtcgYUjcNNq5ZqcY9lNej4Oh76BS+A+buHqXGZDyjILyUvxunyHwuOaqp6n7EDJhVL1rM2UT1uJ5

K6pLnq8VnXTjBov2UZFfc/62HH2VnpQD3/r+FnHLgM
URcT/e5G7tVbOa2Jw3R94+PVPOI4HrutJxOMvPp+0nNwU42fMyPN2hz8GxtHLH2tN4N79ojCmr8Uaci3

8JkjfrpYg31famvSOq9YUvWpYzBtaMTn3fV324LeoLn5RpqUWMJphELmSepHBvCspNXnmvi7BewYLDKV

Uf1eSqFyPU5KFEQkZS+WQ6N/jXuTTqhIIMYrkf10vs
1yH29/caBwfImt3qbl/9zRrU0ltFbNVU+OQqsk5SofL6p/Ki8f8iJJ0ntCsK7jsA9cfZ6EJUzgfaqd51

PxBcuKekdHfktVpnaWW9sf53pelR4/OzCpPNVSqbqngQqz9UFDGSyeDLe3qoeSItQ7kflBqxxkfPqIxe

zuyIfeivV9CorQiI+37A7RNc1OEnwhmgzNBQZsJ2ul
+/1GnUloql8IS6IqOw7C694cM8FscRy2EnirULxmYSIUwjDcfcHLFLQBA0szctvg/FuEAkW2j2MvN33A

enF2o08yFOWRzkKMDZ1Rom41Cp3OjMSP8WigaD5LSTHHfV/EPC5nbSBJoIyzlwnQogL4cnHAAQ86pFkC

3reTQ6xc4rF8oTGr4LZG+9L5B5WmVERMb1GH9jxYls
n9wzfE8F/tlZ2dZyVBq10S+is5ZkkXkH+UXbQTqg4o1ctV/f9eFLcYnsQpf0i//7Szg/idxHtERBl+AU

5dpSMLylfUhyB8VhT44l8tSjJJEIBSi5hbFfxiUPM6zspPBQHZXaM+QTmfaUWhekSW4ba7I78CJqJ2in

dBReDaU6LduQ+V5uZ3hvSeCxMVUs1+umO5vclPCTS1
OLLVZIxuPPkoD9/9G3U4Nh5Ud2P2U6Wqhm6DPE5193HBPsyEaxqFCR/pdE7R1TloThxsfNUQwZSddpa1

UmkuU2CfD7wjAC3ii7+4gNCL2oqLsr3zw1Dz5519DDqeS+ziosTY01CmvWJ+I8v96iZAfZCKRVFRZk83

Y/IgItMPpn7J8HBJpZOKmJGzQAjr7FjOFXR73+jva3
L78vY7171HszGhep5YvRti7ZyAsjyi91eXaEW576zeg8CTxUHXUh+UEiRupvvjPcTOmOhpqVu+m8N2Ye

FwzKzyx/Kd1LlqGC8bxrLVaSudn6o5Pw7ea3DghbQrcIsy1+IiqrujxTyiGtJuUwYLPCbHu09CRIzTBH

FDFRCzA599kn5DgMk+J3X+JJFaMONBD0cmrGwXluLa
7RpnvErV+MCUjRXVEc6DM27gSFllavra3lNTDf1EJMzWROnvybnW2fA+pcd44nlSG8JrL0XptnbMGEJM

V12Phd0Uk9tKeNcwwcj7S2r+bdzyAjBmhNUJlX0W7oRzR+JxelTzIk6aC6LebI0WAy5IQ/hflDeSRYdQ

zUm0K+r0Y7WRKIFbzjdbJsjgThFG3mHGp4kOoOpM5M
GESpDO3qn+5CTh1Qmz/rC2WCr3X/U0ISVXSjvDvbxWwZHF+yd5ZG++cHjvFQVvsD6PHE+xVjNs1YiLxw

7g7TUylWyr3Zz5AZt+D0yjnHtkMXHc1otiGUJt4QaMGExzEOLEnqhW1M3pbLzlOvuN9nplGmuM+fLTzz

1HGC5C6NAKMwTFT6lC7uqzKWFT/ds6BB5iMKpX0hfc
KnmoNcDiEj2BV+uOP1UwQ2yohyGv2rFVBL4y6rAxjhY6yUQ/Y/3bgv3cz0tkF84mf6HtXXCDB5Ar3fXY

iUbHdSpRiI5bSIx3RoIlGmtz5B8oS0dQy76FoIx/ZQJIn1TYDbox+KDnvxSQsDEWi0EeZF3CXOiYpcme

94H54N3Dx/PDzEsVi3W9zSAzuHFFbwuyYUZirzZpsF
/oB1ZrsQ5oHG+ndt9fAjtGtaqDY+G4XHwjgIBE52LzA0oetQ49Axe7CSfiY4jxfFUvWbVu4j2bu5oU+f

CCg92gIBjZXpLqU9rGItArXrsZkuHMVn0jik6RvnEkmZEFGq7qOzl5/OP+j9FXFrY+cRV5A01LABiBhR

ixcHexXzA8qoqu6nX7nm/Ci36CjxUV/6kK7x2KF4BW
FFS2O30jdnYyV831k16tOT7AEBQ8KqRnfGgcwCkZlht4GCynE4g0dY1R0i85V/HujE7RkYljzINPPacn

6sJDMybwbhkblHtG2WIbm9m+HxG5Y2PxbqotBHPkmX7+m/Xh7VevRwnTc+bYwN0jt+qgQrtLHOxqmiD4

rqcuTNsVRMx/nFECC45YsAsePX8+b5PMqxfXKUgMpN
GBPzeaO6aNt/neLB2DGcUegduaXLWVvrFeAFyIOJZYyzLdn+sSh+ml4Sh7PaugmCAZIKz5UEDXvJT8Ph

fQzzxO1XllJxDTYdAGI74Qp3Sk45S1ZKdo423x7uca8/XZJ5AwcP/BLApVE20LMWjhP+5E/3AMtY/Tn3

cApxzsUTI+/fntu7BE8m0I77FKYYKVyZAXG/joG+oO
/M09uIouDvtiP4cKDipZCWb40UgiCCj2c7rUk0O6dIP94vxJqkmSVYyouj56dVoCUiweTgPjH06hleh6

5Ip41h6Z5YTJ+WlBAB2V8i1RpaOneK4XP/rMO45atBSCld8CVZemQCPMZEL1J988ZlZc/2r2SJK/4i3n

Dg7l8XFHNLLcJx5QTzMav0i/AJnz7lwdDAvStlrKm3
2zKU+gpJgdw8ihetv0bmkvocteRWgsMODiCU5EE0/AwEAHTJmymvlvmGBskv3YIVrL/AkEhp8RSwpg6b

88JkZZ2qgRVAD1UZhY7YynC3QCfaXToi69D3cmX5+ood0SxrDSck6O6lXDelfEU1LuMOQV2Hba4cPrH4

Qud/3nVdaqBPUijhi3aUx15AdI+FHUMcCHfmy+mqnG
4m/ZPG/JzjmmDKHa1z8uhDQhUUXCZ8K2+fftheckde9jDK8Yp4wbvT2Vr31fG7EmRz1gDiTDb+U+z15P

uzZtioMVRRPAg6hDVE25nSabOgJ7b5Khr/vtfWwZHUYfS/u/gF0RWPeCZluNfkQJcYTlaywRFGw7ML9k

SlPQfUvxfxeQaiXrma/wd2Tp1RDIRcvpxl/8SJqJ48
pjj/OUO0PfR38gGUJ3h1LtpwKjuEVqLOj379wkUON47eZtokUnJS5r8gzgo2luheSwkkn+hdQL46OhMA

CNLitffk0aDnWEmzZG33a1kUUCg6gLlrWLR8cyWdPtsPX8ZB4dMXE/s+r5Krl3WenwjQat3XVsH+Cdem

2z4aNza2H0vs9llz/etKV4r+iMJMluaqD1QmXCiwzl
ZFmwpDtocwPNVzJ8pZh3w/WqoEHja6vRFg+fcN5NMC9S8/Ws6+rCP17CkM+isToACcxwA2WVDuo+dsaA

nydvU6ncHosNOvLwvYA6jtwWZUF/5jxPmu2EAmaXGrjvLhH+ue9FOuGv1tQvxm9jqrywRXBEylQ6JWMJ

FU6b95ZkumBccOJyNQxW8bjX5dzr24JtsV9u5I56Hw
e/FCO03jNj9T71A/SE9nyuvft2yZ15+3t0epm+qsIecz8Ffh7210MatQgx3H29GKM53yLcNYqSIQgsQt

V8fQifS0rfBT4hVcxttb5iiUko/mOVSDc5rBghfYpP0sfhGab4giPqlazen2aSQik3wBx8FfroAyDagT

Ls7I+lC7v3z/dWHxTv6nf4IyZCqJrM0Qvw6Yz1S9Yo
C260xbVtieRML1+Q3INCYeYXXVhnUXPeI71X077CNuaoYuiHTIYRNb9O1ngSA+Qz+MQ3gFOYgZ9WqJMS

i3hLx+kk/cl5LSvQL1LU7oqB8y10SLbRGYsGzPcYraFkTFvarGFobfPm+hy8FKK4G0z+Ay7M2QjZmOkE

cUBHu2LwbQYtZhCHeVVOQ6da5ABt5jjNtirrQi+1x3
gkqx+vqhasUDupyGCZRRcp/bWVXQ/jIb71n+kfj3F0f8M2BLu9gCfoSdWv7VDWp/JMP01q1uzbCtqk4J

RZ0FhGkDU0zuvh0c70o4GR+UHgKVNiyh/ozIhLm2LJlOsauihpKgw+tPlS+6CtZDpZ/juv2lIRmnyoqo

269rXaiwqME6XoszJwp9vUYhznKusJwEcDC+iFPmdd
Iifjo/zcfEYK/daSPSGPXNNNnCZglCpJPVnJOv4Lt93re6b6/Iru7pCoZd2FtCVf/BHnaYjhCoPL88Sf

7TMwlWBEefdBPgskhPnf+Kh87W4Vqgm/seKZq1pYJQh44IkTsj2VJ4ziB3XM79uIRZa34YRhtwj+diuk

G9x/hXcK0vr7C1NStzbvBOfI1OlOJ92JFd/I83y+xz
4AlrUvgJTOPnRABm03s3Rh+cPOVccNCkZMdjWSO3tx+6Wnn3eZYhOLooFyHj05LO8zmGOaxi85GYrU6W

CQPR1MtghDLtbCIpvyaSYcDyKNfuxy2uPx8ApX3ZR3Nsb5sSGhqycWtpJ6RVIu00DzSVOVf1Sn+K+bPX

0V8TtQK3TOiYkTw507c4GfwsTvVjstk8aEOJpgOhjN
Vbgpzr6qGqeFE5GNxpcOZbhY+tMKm3HtVaMdUcTplwiqE7xBHf6YNP25pzQuRxogHfryWKxP62H+qid7

pRYj2quTQJwVTS+9xPI6io6CAI2VhphMSxh5TkXSV0RamMWCfyA8xXVzI3Xj3oCrpyd09ClBfjm3wBxv

QwGvbK3G1mjIJxmr5V7vOdo0flOoDEdYNCHiPvsQaw
xdZDqFPXj2KefRla0qaxwV282FAG9YDKiEYBKHoR/TgTpcPfwW/r+pfkGBDipAqQr0zDeweN5TTj9jHC

ci5eQm1dTkpaxK7i2I9roPaXqvAjG7Ka8mdy120kSJgkG5YrorXEzkhgYqVco6XNguz92NzesglNGF4s

Mz/osa3kf3XGr7J71VWzn1i1wpg782HDa+fZ73pUMm
XaTBzt2mOaUcGG7bpESkakx8E1eFNW5ERghMhwEku6AFUrWhTpVZSSUJsxd+kSGLAjaARncrs9t+4mAw

2Lo2d4Z4/gwiTv+9fT/+/GhxmSdMa9TD4Blwrf2X+CD1rHL+swbQI0oTXZrTrIJgfGf54PZuzMKqBL+7

Zb+uuAb5rgRykZ86MxBxjNYLwI7A2AeIA8IrhZIh3R
zjbg5J94J9eR8DL2R+6Mc5GwMkfT3suOaWamOJwdoGQ7P98RXPHT68smJrsZWZczbbivuGZaXlwI13lm

2Axg0+OGGJLq3zcFEeaETpL2Zcv1e1XR9ltUHbsjE0kM9shvmmZZxdxGs9dwGerBWHpeWz+2Mlw7+Rbe

24k9nsF52jqCNTJi760GzgTw0L4cpocBE/kyPccjDq
lq4Aoy+rEUfuuiHCKF4JgZFtuJxhidhYT3jcCUjvuRmNY8WkDumGoBTbgIDr/A+7b8BCFPILNtg/Qs/0

cGwiOcRTXWQGLsKbyzaA2x7r6Dz/lkos/cs6R+n8DDYr9f6B1qWzdttzWt+HjSUtFMwSIpI4Gxxs9lIy

GHNed/3pHY81Jp+N59SRvHiHKDQWyxzbks5n5kN0Mj
4E+3+/zd0NYGPQYi5rime4zk5yvWf/Keli8xSov5No2726ZH6D/1BnV1ny5x5+UWGsECt/1+Leo2tRzD

uBcuJu6S9owoF/ABC4lg/yly+hgmOZhZE8DemaOIhPaooRPSnZVIawkPxRvKTy6M2Adb7/T7pF/U/pNu

X+w2k+g0jQibyeaQi4gcjZZ59PuWfLBrTYyc9zghe6
zdA6UQrlJW3L5rXTt3DDVehXwtGrt/VVCsDvhLtgLW+8+zBi1NN5ltbt9FA/yaLllvvGqc5+0nCUUA62

pk0kRveITdIiZrQw2Xr1n4gI9Fe3AgLfLWn22DLMJLM5EnMKm2C7H+XNIxHPc/Zuhgrejh1ZkjTZuc3P

W/TNxW02jC/FIrsEpeFFQgU/kaB7zfGb9VqeYaC/pG
CBIp30dekWBguHDUYGyC1H1zmzJc6Uv9n80KtvjkhoKGAGxPeOsfDGCMcueMqjG3w1MjqEHmhGcnrhkD

rarHb7mLsUpfHn0zAUaeyzehmAE1P8uRFb9bngNSc+WJd/1ps/BhZlrfKtNOmu4DXFCgzigyf2r0+MYw

nCsZSjCGRawj4WryaSLtcKhM6pxx/oPw8w5GDk/YT1
3FEmnAo9zYDOPdXxV8WpaOAe/y7oMDOmljrgz+jjuTR8l8T1UBx72R1XR5vW88ug5tQflgNU7ADnDTRZ

bNqYv3Jth755ESdw3zS5kx1AA5KFIlkmA83/u+8kXI0F+5ksW4g1h8N/Hse5FPAZc/+ZGThXVM9537/x

TJQq/1scvv8nNxltK2yj/55k5R0jBqRr3dlpEye+4O
wtqPY7lXXuNbTFejtsU4Oaks90YFCbo74XHuOCKP/Pz3YI//Q9gtK1a/KcnZIgJVaTcvQ7XIj2H/Gw4i

GWKMqE6g1tB4Gs/wnQoL5yP0by3weRjQcELGdbk7MpJa+zTRMQvUzgWDkAqyFft3TFg9hI+72wDAgRVG

NsQP9Pe4aM1RV/v0/WtggqU4L17BvHF4mGt1Sbsf32
8lixVgJR/QBwC/8G4pWLMtxdToYNahFnafxu1Y8V/CUmDxy9lwS0O8/CVVAo2mirKWHM0iykwDrZvuq+

U4IlNq1wx5VPWJveckohacz7Lxc7PW1kM6Gj3jmZC6c9J6yRbv7WpLIRNXRN6lX34NT2USTba1nE44yP

V9y78L6IjS2MD1ZL+IkelTZg8FbuhLHdHtJHge8c8I
W+lSm+V/R7Yz7Qy8rQ9rLrWQ4SBmaNnGXAmwH1zcnoE1OPmoH42PY2//aWzWXpg+Px2QAKydZlJzyuYO

vUQUS/osWFNwhjmIOj6bI4TZAMhuu6Sa7aNZuCfWMq2feIdx1gIb0LApIiS05aogwJ6BCzd44fZUNWXw

FD7GdxgjW4w28CanqZphMci7BRfwzJUC7TY254Oxag
cTGs2ksBQ3+32rzx9BrAFBwcmblleXMyOB5WQ06QX87Use+Xa6MU07uESk2dt6ce4u8qTu7pUW7Tag0q

7HxKxUSp/Q7DxZgcU9g0a4bXxGVSVzqqfhKhUX+Tab7xhEQ1REa+TIkHM3osJjBk6dkDmkjp7ZRBb2J2

YlktAzqLbRyiemAp7arzvLxQJPznpoveUbHlwTVEPj
TqS2rE9bYSeLgHlJEica3h3YY5U19WTGLinETwM+UaEe39HfNyty+HzRnKXvfF81B+XKbHGA7T0sOqLt

lzJuptzQPl1ybuZGv24MGod6YxwSUvW7j10MO01s7xdNVLZWRtZoQH+kqerOEn4lnlNPMKzEemQd3pXn

3R+c7Q9vrwJxbM0QU6gIMAy4H02YkS6xkLQSxmJ5lD
iKzONlif4GayNDpwL4IRP5VVaMFSUILOgWHBbdDWSrzpYgtMma+b6t7uWj0Dhj6pKdgns/3ese1xez5n

E23joXikT2lq5a10i7bmGXN4H85t9deT8PvgIw6A9Iy9/ep8nD/B11uyzFoVYl3FVzsw4Vpi87OyeaOI

/R4N/8kZ6F72xsl2A3fnY9pKbYgjpqndvJToLMqNf/
KwuMu+YAtqrjLbJ1kmGUOBnd57PKsg2uY3CW3hZRB4jKO9Pj/Y9NePSiLXqrJloIE7XBqRylxo4jOk8v

bCJJ0rlES6HASA+QjmpTJcGgrBYekhkUa4cS9dyc7Qk/P3S6z0vpLLpNfUaHyU5BQ6Bj4dOeMTT4pHZg

USxOecL70RdQkXPFdxRPgJ4a1JZ0+pqoyCkmqlA7Mm
beD5kjBHyEISiB03t/A6mf+95hxp19WuN9orX6UZ1h0ajB1w0rqW1AUuQREcgimtsMTH+Vk1ZdQmT1VW

6Ch1OImoT2VUgCiK10FLcJGI4fGRTmlIt3ZnH1AHhNl4SOz5zxfECrMi4x+iOeOdfh/Jk67DkjMjuouW

Yl4xAk2P5E4TpW6lEZOnAi2+RGhCCr13bN8F/Maexu
GlmXUqGraD9mEHBs7p3Wi0It46yiGt8TJzo/Sk/6r2SZZQSMyw3kibl3RuEX6FLridWV3Qi+Ia5IHjlz

INi6k0y4/YNPYp7g6Ng+H3hNQLW1mFxW/DJLTgDZr3vMSS9E0mHI0nO3I+15C+Tzaa6/zpiKy8+/aQG+

mp10pKaBg8/55XDTgnuV8JRi8qgm5fvpV1uDohEKAa
XkIu2VVDtLeNGjdF/nqfVG/qNCla4t+nJd01VQpil71bXGOTLlIOn2l7jsPpX8Z/Ag9suQJ92vgeufw6

PmS1QDkq1GScZdby1zlE2R6JjzacSnsEJf2lOxR1lmpb1ciqyPqk3mK+3fTP0vARA7sf5tGEeUuUWySj

uadmWaHf8nWOJCCA3vyPZ+qr0GP+si3NXa+khNujVx
5JGcEpV8pr2mUmYqFN8+OsZeqWDIiFa6RoJxmXWhsJ9R9tPOSzpWLh2oCpdVH2gt2d6W8Y8qbQiMfdXr

5ILrKQBj8Ucy2spau9Ym111M9n6/rWU6MJLdFB8t/APB/deHxBY8/zIY6vzK/SqDZCizx0ip5VW/i28g

BHmVVRz9tcCyNivn/WBwVRUTnfSfkQ8K2L8Q3g+Ll6
crm2OcE3jxRnDv/s1+ZBeXGurCSOfmkWXHctt7vEF7KYh48CKnEuimpjXbz11CxrlKHNer/8VMpRca+Q

PxjXoBVpwPa9WZO7a/xmVcgWgOxWirdi0S398A+GqNekgMsS/AupKGLylAvliIpBdgZq0tf/uaoYZfQt

xSKmrO9lcL5zoT97/Uc3WXbkch8HvgiBblZ1OnS2zP
XqCdiJ/XK6Lbpr7o+cGIXOw74qvGgK01UigtPulY2xj/gd0y389r8E5/QScE1J2KyqMghU/9EmborxRe

cM2JutgCntgLhQj050WWFnYnDtDM3czymzEXimq/nmd58REr2619eAgGFdigncDlObK9473QIF7aR/nv

MHbT9JnrTr9jtnK704zB013iRXYlI9IIP9JU1YYVZc
dhLlbxbn1tnmvgbvXd1vuvM77MsGE+oYqwzMKFuKZru6C5L5uiWdD4lwGAaOGsra3maEeleUc/SPOLCX

WdnatT0ScucNDu+gu29GT3TEqw58cc1e9Bf8BKBN7rVMfI4z2gM2Kw2l+JPa3a/se6WSeiJ+3uG5IDG3

8d/9hm8G2qZfpbE3irNrhP5p5jZqxm8pEAf6qSmrFD
hVW2TYxlPFXkVQKXYSuewcuvmFP7FpsPlgC4iTkUCd1TMqobkiZ1IyGAlAOW8ID983n7lLL3psDZH6Bs

mxG049U+OMgiXLPZ9+tlVM5Y6ae4O4x+BI9VddeuYqdpFCQg6XNVuMjsRpxo5NaS5Vr6dOdUloff0rhY

RRFUqYlZakRg5Jyphgm0h4/CCmS9fOEXTDBm6oBGFW
aD0ljrD2VctofmoCALOcTOIm7Ev2N5reQVLsTLX+s51fe/y7iUWiZQMrrVecb9CgrGz57uAb3SYu3ciU

2N+glOx1Z0ZUhi92BsjeexN3AegOENqgEBbSOWc/8FjHGb/59atVZQ/abgmvSBlOWimygn7/IHaWas5W

BYIowJpwRh5zoCBx8Uw5+XJN8gID7rFED814yuRKIT
+QSVk0aDP4c9TphTMe1H5bEIQYzE/LH6b5ECu1UXGF54eykhoJDVnZ2oZ89SQXxDhspxGYgxqjNa9cGF

mQ+Q/0/C4B1chLITkN9Xmxcyz+sZ8DyS6asTybebQeiSyMCcN18iAwrqbXpPerETwX/wwkfTr6uhAsje

hJfQgj0WPIIKROnD73WwsUilBGKb7GjEsSyzSnzc0J
HxdmBw1WLkqphEjEiH7IDCvb/foRlALV9Tv+GSPud0UqcTGWT0TdWHbi7bgvod2FfV9vEZzkXuG69ggF

QpM8Fhqq41ZkXhx4JmIn//OpHEBubHpf6gnk629g/7uA2ILkW1IEvKG51pI6Hb/tfjLYWk+HZr/KZ+rJ

X4XK37+6R8FEAOwjn7OgYMra/gxXaSW1DWuxGYWxce
pU4GzEUShCQULDs3CHSkr/vO8BO1WRSE3qATqGch3XS5LMxZiAqty8jhgfxVaai5FmQ7auOp/eMunRz9

D1d/bouijzdoMI0KQY1Nv/XRTuJp3zuOYYKZhMpMQDNY94RzXjEQiaDjoni1/SNKObwlxKu24wLWb83T

IK+DFDFhmLbotelCSUsnfB+B7RzwZr3ZRD3R8gpct2
a5wgz18OUg+zUtyGJElpKcEFeUDLfnbd6Eh2ur7PJ89O3tgfXhtuklfdfytgrZIP5LnTKCoZdplRPrqo

xPJJo4jDD2fa+1/Zmb9kcxXcb69/n2iKa9x+OwgwPWAIDGJR7mCpAMYqv5yjnup67/os3vrlH766Bsp0

O7/846OTJm5hy3l7GOKUEMj4MWzDArFhrXT5pnIXJL
0Vq/SzFGHQJ2UslycrSYUYdqG5ZNCAdyxDAPxKvW4doECktymbzs9WfKvlvAqWW9VrJB+rIrmMDA5gik

v8EaDN54BnrkJ16YMOTbYKM9t5zvv+jtG0Dt3AIyFGC7Cb8rTQcQeWnm/373lgM2PzcOCc9VYckaCSZC

0N1cruTCT+ueTIjpF7a/+P59SbSKf1/C/4WG34FKnU
Bo5n+UUzUFeKrpQEuaCNOk0VpmXmUSepuhAIzHE95uem3qE3l0nOo6gRXSyX4rs5D7nOpyEyvtZMrLQY

CChWKwnpswoHE0D8azpLI/LPCA6YkycTTi/Sedst7Nz+PxnKfeRR2qfnbcATIWKeHkr5zceXt3gDFegb

jhU+jv+a93MmZr6uz58KoWVjXPuR8MWhVz9Dph/QYi
0S9ClRCe+iRA8W8KTmsyMagNa3felI8sVDetvG/Y1hOZw7jxCEuVyL0+cFBl+pwmGxVxYuHynczpNJpS

8kkldWO9G8RqAl7X/vtcsRp7RAIeUC5ogojo+Gc43Rw2D0glJuAXto+spGUeo9rkOik3KHPidGcPrn6c

EHmV1PhPfZnPAc/VND21Nk/yN1EC2me0dMtN79m88F
S+sk4JtA3cOAh9YGU3STyw88o85f3jva7nJudzzmAv3Z5RX5gC97DLjm1ZJJYgP3Hgzyht+7OdRDpZfm

S3lnmlIj2nwpUrsCyqlMxerSid91YWUa/3F/OfUj/ca97SQZWdbqyewCVw7uGndF+8cVlJObLbrI5Rap

zMcsdq6ATau2isOhsXc6PPyAu2hfli/pUQ/v+13L5X
RWtzdchSXpie5FcFbaOVwnuSPlOWM5bdZt8PjeuJ0RND7FX65dVPQ+14t2EIiQt5xJya7RenbJbRMEDY

74iMOcxmjyRPskWYcxPpqUOpwv+I5/3H1Q32g9KRkQFDMALbadGBJQwvK5MZj5szRtzToxlrD2AGs6ih

FuZKJ93sep9KQTpPx42NBsi4l4ifBVlqNgoN5Pvbzf
f5G6ehqyI3FWA3ZNmoIEzYZM6zUQjo5W4Ax3I/q++S3KmqlU/8kv7hARa01TckPk1ciFIu8LcfqDCQln

BsTmHvZbXORuUkDFItPQW4pohYYVmSbeFFjbRaS7vMhPOqW/sPbzuLv9nXjh3PCIIBeC7NBQylY8ypbw

5rRsv4KCRuEwq+jXav4Mtfc5wxEaBZnSySAdKrcjy+
qXQGZx//AqhjCWlQpTdCZz0gxfaGnlW4K36IOFHG4VZ7jK3IgZ5tlwl+EdbBfKIIXJe/e9IBPkpdaxM/

h8J496vqrpWwQIJL7MbWcpaPMqYnIEj03TyOGVm36MB/TCUWhjJVB8PWu1OJm1wzAAt+njac5gg5XMxS

Rw5pH5/wcdxP/maiHArVZaoU9tX2EaoGOFp1rAINCW
SzK13sAwHb37UOXtlLVFKSG553dGQ//LH/IhwUzuUoJCzCz3lXJ4yympnTakhsyHpVax39B4Lki3nbaj

oHOjKPcGp1SzF5Z/jPqPQHlkCx+vxX+/EUb3MlulsxZfXzcbdmzFGQgx5+W8Nnos2OZdAPKqDxTFprwl

KrQR74IratLux1UmEqNR5dOzeNt33tYev4SSDHfkgN
j2A+M9RnzjjKjNzvBm6D+YUO4qciXrVPTp/FOiwwWrf0kDVgJ9wpm+IiGl6lPeahMoJ1XoM0sKeRRTw+

tUBwXOKaTIpzFM3r4+6nbgaV1F19PhNdayP8LSXa10JrvEzNd8BtkdGR4unuLvxsodBQmnLgSqYK7pvz

RK4Tt9Nrl4OrpoROGzbXrJx0iA6DbltcEGDsmdmsQn
HherDQeU3/JX/zcOCnNMXmkc7gu0aeD8PuujnB8+ZLWFLNk+fs9gI7iHfebOFgvvGAv9vVMfVq0ifkqL

cHn15Ia+XMHcvG9Bk94AnfTLpj4e2ky2h6m9OT3nJhvsaePRC7itCWsCq8DZXud0EIP2GDIlAcnf35fO

M8WjA09lhh/7pc+nYRXbNgwzVQdnCCJXDZ6wrq14OR
gr6hGU3T2ic80+gfoP3mLYKwEmj1vKBfOX4RWaCyNu/RMf/dw6GhYr7m820Y0IEJLRLOj9VtmFo4Tsx9

UvBvDfVEcsJMj1pqbsgg8gdWidyeEuqEeBPJRyzUy4NTBU7cNyKKsx3Ed+flyi4cnMYKAquDKUo1SuH8

7JzojicSfcGEROPHGZD/ehk1A7xHoTPqeAgxCZGmkw
ZP9c7SWK1Bxq+wrr6bjJ3rGZMxX/pGnuVJzSnatpCdGSp/pQp5OplVPvcXURLZfRSoHsXf8gDm4X648H

V4zhhgVilKaKI5n8w0oX3kCXNOOFXcm5ujH9LwhWQBJlh5C785e682cgjeXzadhBPRBYIj5GoCg2+a8x

Roo8UhdjnclLR/gFQlqv1TozOECdiFdqO6HZMulECa
Ymm8rgvwhWt2cuU0JhC8D9WMAaqlLdQiRnPu/xbJVZ9ffHON4Kps07Sn2+rzMMRcvnSGkQUP5yoHwoiO

Ixfa9HPLnnto7mOzqJ5SBoqWZxfrPf4wF6fG2smoz5eOM/uSV/PKN58PMN4vsuGVbi4FNzxkNiXFdeDK

8T0GrDAVg31yspIgz7Zx/bpTaNouzJsVOkEU24DP0P
/jRuJMhvSLxBrzowpdW3KXSDVzYiiiE7K7b1zb3+V0XAgV32iUZu2+IINg/BsB8bV13ietzJorQ3XP/n

r1QhDGUhXNvOLwiEn2ktUw4uqXI5ijv5/1/Anirudh+OlHyukZnBmem6mlj6dSwhO8361/ZOYD95201+Ol2d

QjCGdwIewMjV0F2t+6mwRg+snmb+Hczk/pK+TyEoR4
v5wXrmdNSOlE/jmVMQlirYGiPq0/CmVz7cA2ZqNDO12x3gztl8aT2A/z6qQqllcPkB65xv39UrkJdxMW

kS6Cug3Y8bsj/zDYA3vfC7lxZib83sNf0SiuPyMcBSrac4OoRW+9tJxFZ9S24LIYMJ51uKsQ8z0NoR0x

Dkqvioua26Fv3hN+/k3OvuVRU/o+HeDC3iIV6ummdI
0Om/jPpFOXnnEhALOF/yeTzEREErgkIX7Ozod3P8Zash/raipn1VJezMSNDc28W87E8b1w3VxSUsj8oq

k2tJdF1+hND1eseF8ReSQrt9h45NZPrpYF69ICM4twmK4VvZS5l9cNHw13k6RoPRNoC0byMHdMQ20Q/R

sKoI2nHNg+EbUcdh/pIbUg5jDAgPfUX9EYVPv033pP
HFK7MIOiLGWQ/Qgd2ZisTf0ArQXWEAi69fhTKWPAPQpCq0so/Kvrjbdpstytd+rtvkM+wiyeO0PhgD6H

Yxw5hypnzmF/wsa8t60b4/ED+eBVi5iQlZVYgbAY2wCzgsgjsBTke5qEwE1L1+3IR46uqjTj7Gx1HuDL

NjBpOeXi6LI5vnYP+v2L7iqHdWlr+yAbrCkPzjbduV
+ATAmiXOWh2O/p0zVe8dLovh8DhbwnJ3RmHF9jEjeYjqLCA/36YDDd8CWdqHOwkcYn+ljz8HFtx6/64L

cxb91Waqfv08HHjXhctKOPduTY9dqhR9gWINvupL7LGh/U2d06DnvJUkmtecgC/32Aez6JsHhCoxbsYM

fSn0uw6NKHIFflrjIRF3wfb+NDJeC3UWj1XYz4eBJN
nXmJqiLTKo87vTpGprQIPooTSc1diKQqpc4UrULsUzzCUpgv6Opvu2SDRufIXRH2zXBhb0Agt9ufDrUh

H55yduPLtaoSPyxR/SiGo+dAMC0POljOVRmwFFa/fj6I3Rxl4YGXr2DVVDtgQ2PAWA8xo7/GdsXCMVzw

U22CSRQzahZcedOvkxEkJ8wkXrVhjTCuA8moTaFkIW
agKw8oqP7irQ7da7q+0KCq6ezJ2IpJCCuQ0Kf6j4pqI8JzXoSVAZYe5IAjN7rLFKAPKAfhyBdmcjjt6a

YedJCabViPXnjYYwG5JUlpWpHWIvv33O7PuK6l/wzP5rwI8fhPG+IXwlXUX9fqnbBm3Y8TYAbOb29iEC

/D0SuVzeCKhyMz0oMWcEI3CpnHad3YsR01ZKWfNFBY
n5dH7Nya2B1ijOB/18nZrFt3+Kgm92QT0JD+fAPNjra68xSHOt4oTJvomEDkYIQRLdsRVEx7nRdzOY8L

NZBDHlOi3uFw5+oB7GvRdI/H6RPmRZOgH3a9O8YuGhrLOdFMjQ5YFUQRaaeHVbtoIoWr6JG6aITDko/G

/OGtolAy91JpqOAPSxI3EAHtZVJ/N8YjfnY7o8Z2jo
nOgGFg7HBLcrx1Mu0eibAXCAV4YpHIt1IrT4tY3LYLzTd3AXRqOxARHO2jKQRVxcFSt0iJ4wJ0DjZUFB

z9/0tXjrZfTkfn3UzTUiSwHm84XreHMV56SnoysmevEl4a2QVs5SpVT/ddhXdmeB0SUykGxqujNv2c0V

npjuqiodO9zC5saoJS3vzpx+KvELbUFwQ13XKwRVhz
yzpThVpeQ64agDemxP69BgFshtxrpxUJV8A34160qkl8/ds+Fzo8vL/n8i+MxMsqjCPIjb5j3FsvkAr6

4P5yyqxz+Vuw+RaY4j4D2lTyTlT7JtkcrIzuUOu5nLU/UvYOv+PLy5u7BpjoQZXA5MasR3Mu74DfAQXe

BDUJrOi0Wz9W41SwhH0mh4QbXjvO+1QlNsqRHJixGQ
oT5jT63PmSaAbaUsfpGCceoKT5Cf2MzD1r8gfJIDnvWY/0bDSpk9/gf30kF8+NBzOLGCwqQ2DDu1E20g

QpTMOOEwNxN+eXYnKcwXaDlXgKZbS2F+R0MxdQg1SAFSOnCnkt325+ZIcpeWIg0Zlxo85qpEt5qWsh3+

vZRcbDsP9j7Zf62ulUbSajy3uFIXpUD8Ni/wA8w0f4
0i3ceksBQL+48LB7DSkCwpaxQwH0xOCa4LKYE06wJ6f/9ycFjwXfwcCHsE53vuP7us+OKTR7Z/GFph3e

zxIYsb0697cuUli7y/kHEksgpV7Y0A/ohO2Et5N6DkStP25HjoA/FpVro02rvY9Gsqqt58LYwTJRAo7Y

yDDJJNmwaNa6aEJpVhBRHTG53ER19jHl6Ode1DjRLK
S1L0EA4rOCOldyHbnn6vQetv3faqnurY3XUmZAGZXar6N0q0l/1NMIfvf5QYtLAR3tZXQK9WZeLyUYlD

eJBDbLon6j8MTL+csaO3/+9JdqM67VwIYwZsEFjmRqtA5EHrjm3tmfIm8tpbmBldBpUmlE0D/TwjKew4

7/0K3K+jVZYVZP+BKDEYtmPh+uFCpvXH5EozPRh5sU
jk3q89KorAns8Y9dNNaPojdQrTfq1szJGH9g4QJlc6Wt/3I2LS7UZ8VylVKVih3Rk3ZmNBm3qwWgjYQL

R3NtZyuL2vXrtoAHBQMuFsSH2wlCZBkYDgSSl3uKiabnCagIe6+FObUkWxbgKI+NP8mOvnBemaKGV55R

TF91ZRFsU9utQPwIDyYZ1dSwl6baFo5L3We9LKqFsb
1cZnh7X907Lefcvf3NJrvgQMoXfOLPsRzZU2ISpVyO0i7LU43oP9shi2stUFIJd5J3tpq8YKdnigcXiC

WGvGuT61hZCRyo3sDIy9s498ElOTS/ZJ6PUA7/v+gfkrNp6oTx4sqn4u2xIIXdujlSwK7chXwmhdDxa2

Fq/OT/yXjd3CfxKD7MEc8Av/F5dJ59oI0vF2/oZ5KO
8ys4ellx78RqY7J50Vk1Cqt8BYZ9MI0UQrXFEybZevSF2ZSKUsMh1AOpAHFTKsdIqTr9sORmz+/lCnXJ

PGJErga7ln4SzeqLihbP0S9yGF66ybrf9+m4OXhPDXbV1QKl1m0H2kATa+6wTUBGr+zKNjXMVMdC+HFP

GjtoenS1b0ZRzWwG50poiPbTJ3G3aCboJEzhnwquG9
rMMuJQeu0P3jLHGs3jWhAg1lUEUgsI4ahZEupnZMZ73lt9uc4EtQ5rnAd4VPM0Rxk5ysbwXBher4N2ch

FQ3pIv/3gqfgxKakfv2ywiebdPhcyJwhrv9y+gH+uTzBVdDEgQwXf4z4/gwQmFd3w7DIp+lpF7SaRY4k

3Jez//2PxO28QahbIPRsJqdc6JP3TL5Gcdhw4E36iX
d1mw5/cv7h3hruNBU3PqzmoZ9aQIey84fkgrEvpk6XeZp7uVB6+5fpHM6tvOJanIUqnOqP9uFaj3ZI2q

Fe37fa4ecQriy1irc6wp5U/cS2h8Z3nKXOyDCKk6WjVhI9rYU2TbjFqXKxflKx23QWtWs/pSMvLMirMY

nCcpfF8M0lWt/3jAt4J9nbzwOKdFTeOT6gTqtlVwYP
Ekw97/IAgAnI+QjRkz8+GPR3zuq7qSlvdTZQ5m+cJDOH3ABwqFU/Ww+VB7b9RbXWgKovem54PPx8PF8A

krJPg9rlbCyAREccL1Fq7x4+MbhwrtBgPi4hagldlXS+HYJS2kdzD5v5Y/G+dz05lwKtFaF9SGTNQlXh

S3SwvZjtEARrqSVQ28zk5xmmS9JiY9hlHgCBEhl6hz
fC2xLEq42+zKxoSDhCBnm/JBx4JGV/aGl2KURgDG20vPpdYRMWRaw1YNQnxZpD4DdCR6GoLHxE+QHqSP

4yLzk74QUmyGKkzVxcVQn5oe9sm+ISRd7EXo0FXEc15NKggeYKhYWQcAU3uOKcC0vxJJCltui+GBqvQx

/dHS+KX/6zxN6k2pDYj5imwV8oqFcfuSNcuqCj9y9F
T8WW+NTI1tw8Bbx3s2njmVwC0rnAiNkDXa9zbN2Q3iJDZovZTq0D3BIv3Ok+Dpu7cQDjHH5HNAS/XbxM

Z+iZH1GiasuXWvjSfTViMxU2NW1zAN5h8PErkG2yedHU3xvLxpuRUziQitzHeEGuHIQv6rpkK9bo8NRf

D3j7flVU/TndyEasQK8hXsBGIRoI74AGDrYhUgYr8V
RWxw9kJUf/ph08n3RxXovy4iBVLLHfnWQ3sW54vecpj8JrPFH1x40+/LfD2LZU3W0zfRHsmjx/AFy5EX

3/LDCP+BDEM9/8HKNIgf8tRtxexko3eXJr+IIO/8Zw9TghTVkctPX2BAdIe9eUv1jB03B1S+Y9kri9Ka

mYHNFcZ4ZHVxGS52MO9g2b+qGJaZqR9Myhc/s13wFl
BVvBkd6koS2dJQqP0idN2c6TCMt3V/MmJsfbLRHDVGNoPrZP8vYolEVhrh75FPtEpvyBk6eqpUmztHRr

l9/bNzoUVNQOFvTPKT7vrhEuWaFywgJPb0sTyNexIqt3PdOaN5YJV/lgIO/0ajbYqExX7hBbdJ9tTiXf

Baab9UBUMn2iI8A9THzu57HxTBNAzS2Qn+gQ6gmiDQ
wpDuLdvrISavZSdRVwD0htdl2q8Xj61WiniCieL+61bHLARwa9lnYU/ir7xckt9R2ld3YXJ3Ek0lqKmE

eeMK7AvKpvDB0hKo6MHcq6jtqDPYU2siyy8yyJ+JFTFyl3WgBVPzdNzxMi4OwkMokZklsC/ak9WgniZA

5rJw5/mTgOl+afbilqaSeviHGnwI9xvdU07zqH56yu
lVWIEKXZEcHtNxoNCdJ6SMnqTygWjf6M5k/nQMRbGpqOnWNIIRszbJooyB5iNBOVfGjj2f+lK1nwvPKG

cTIcBV+xQHHUXpY4C9GE2e1dHUdcoTjW+Nc3BoNK5aN5PbOBpI2Blv0NrrCkLOmNZ3S9Ih/9MV99iEvD

6MetAP5tZWdj7K+ODGkBH56VdOr/SdHuEFsz88JtMd
EowoF8ds3o0+Jw3EZO40+xub8A2P69PB0SGnn3Wfz4ebONFpaTfX0nonbZmV5mhxF/gh1C3ycHbpCUHq

QwePVVhZHvlbjEC1WtNInkep5aCFp2EeiPzly2oSJ/QJEqVEab14mBHTqc2YEr5A6wtvkOxBODImK2Cc

TKEXgASR1O689NXY63ZB+gKtp7QeZGVgApTF26HOtO
+fjBbvRIED4VGVyg0R9YLgjnS0Sx3UaMT/cFcq5JGiJCjMsIllafYa9k07r4/bsouzozcfek5ZgdX6Ut

6tMjqlgN51T9fl2vPrM2rftsxHK9XlYK7/qTLzT4UX0eCxG+OV3F34k1tNw+f+RRATYpr6U72lvb1ehO

bNPNwH8GPjWt2l6xN6fGnVzlOVA3a31dgem91n63wZ
Pt71k/w1Yv7/ay2sm94jMC/s0//+kdFc9B+IK1efxMAbpERTRTMNa/aIhb7PhyC2CwMSuj2fhb5H80AW

JKON04DTlr4Y/j5+WIR/DuqGpEqTiL3RpxVDtCP/OAJhCMv0tqkEfXiQZTq5qqDMRLC3/hRG2qehvAL8

MjKKnkTfybCAVq20wzWDtwuTGTSKABssnxQjybYCAr
DFyeZ41tq/xzFyfCkH5nvTNWNtoWdhWnbvbQUqt56Bc0pAjeRP1hRQBvmzyr3cXEmDEHbjy/QkeO9jCO

dpr0cyIu3yrX8Y9XPEkp5RQqYNoCzuFObY6JsqIsGI/OlRlIKeGm94lAQY/OEN9olRpsjPlWkFWri/Gs

FZ4UHFv5dljcDgLKxGR95g16jfc4i0w3ewctsrw2oR
usJy8IbgJf7WGz9Vq0xz3lQLI0Rwj4K5uy3QHdhoEo1GVUupjJCAv7VEevCvv4rejlLzO7/n1l3hKyl5

tiF+QW7F0fB6vmurekrqA7PJvNuhFzm4GYYbxe3+h5Ug5w06fL8ZrbCNaCQoI6LTcDRMWcLO05GLzRRf

bTSI1jUQmJC8lUPILXZR97b5MCSQmme1NcRpU2qBDy
PRSQPwTqbSR58n3rS4AD3BU+nNUGK7PmywVjBX+foIrrm2dwa/vkLYbCF25KHGV5AiT1gonfDTjFZx5+

bjYune+JmlaFMgn8l2UtYOSVQA7jUpYRevMpBPXoQqy/khUTIVwwzGWTYEC8QbHrBLAoes8jMPeD53Ce

n3hrCrX7hNlBXiun1G0LuGQMyifWvffgmLuajIfcfi
HwUq4yeoo2DIqh3w3hW4mLY8G3sCryjpE8fPGd1s1kyKYrYpY+dYuzjRwsNOJo+7WkDowx4JVnUZdWBa

DHl1A62vDc7CvFT31JsdrQMjENAOkp/2Dvrb+i+l8546EVvJEjH6UJGsXjWMPxfRoMWXwScfSVDCnSSf

d6nMxCEJyhqNy8j3aYSqaY+3PXXfcv+K51f7g/7J/O
Wvvss/drP5+E9azqbM5uzrDX5H4DWziMFptsnUSVaZJGsD3wHw+xxOB6rh/UlFxkdnSA4D0V09B/Kole

t5KxVQl1J08rdDtnq7K6pEhG6Ce/0WIqmXWF57myIokH/hl5y4cKTD7J/cE9e1V/vKmTahd3GSmhKZUJ

n9zscvFP59W4lTr3RPnrjQP7+wwf9IwPfuY7vUKZGY
IGOUH2WMb0Um+Pj5H6EVZr+NQWhyKhjazqpfO53NxxTjKdwEgE82WjgzXPl4dS83Az5XyOd/2yc555xy

9FStEFZX65Er9PmuCkdWQOD4WltmLMUXdLxw1xo89rJXJsUVPBhht5zoXbJ8VQyRoOKWoXrj5oIK7Zpl

aPsB+lOabPIecPEcPbpw77+mgJQSVyQu40u2nLSOeu
4RcGAtBLEBT58n9Hbfj/+p55gfXMJ7w1tgWdXpzhNVjmCjfhgXrU+o6ji2k3mA2l3WFvN8ISXD0iyHoF

XZcWWeCSLbSN800OtcgTwM0Xw63zdhYTHbRHInmoyMO98Nu64tD4Mq5DdZHYKuikafpxgsJKoIqtvsj4

LD80RUK5uLxJcq/aiDX3oENv7WVy4nxaEWJZ05KQNp
A/FyIi5ShK6mwkbEbs/SRZrLTFovDFxzL+OnmIr/mQz6VXWlLOq/taJ1EBzhHk/tGeJolGLfiWuYiSF4

PtbzmmPegdN+mnzA43O5o+vxdQLQlhMqes9GEwB5p9+bQZVUK0N8zlaXrDDPMpJwEaidDscXAQsAALyz

BufzWR8XCKj8e9FCYcMfEMOJTFLD5Tz39oN4zhLrT9
z+UYI1hvjYw7z+O0vPxpT8sI6mmPuyPoV+NlopYBre+I3+SgnqFeq/oTFkjhcY3NxCZyr0k4blkWVQly

Wdjpy9onoLnrNzyLiw9lZ3Msrfrp+OWva1K9Ma9+p0MOtXajfsXNkXNYNFEMHv5sujV4ooHaj8u8JJCj

SIdJ1ibsFC6XcFLNXqCzwiaic08gL2COGNB90z46vZ
+BAtxYYRUmivcvsOWJcQ7D6aGxoF8nt+qhDeJRHHxtIiVeh8Nn7tOeb+qmi6xTRfHUP4kmYOipJEUOj4

MrbsqDCCEkD8qeizw3Xx4919oEZxWOdayhRgwiSAaB2l7hYKHDZEOoIhY+f5isnaQOSTbECVKup3Sqmx

4A2mQYL1jtytgGADzRh5696Eszc8jftPyE/1kMGpBs
7PkU+QaOhfolZVU3bdAmanhD7G5yZ3WvJdurFmBfajMw4uYyPt3sLJO98A4c0CbQBvjFA80s3Vd4rQYS

kIGWi2rstPGLboSm2LzJA00yxzVRFCRzy3pE5xbtvuwYSc7Sf6IsJj62BQR4NZfYD9e8apzPOEjewiA1

merW1CY+qQ7j8Y2R8xotPHfKIoKxHJrVUnWNBeVLPW
DtXBI465qhNxLR8hKLYyptsdzqCfF0PBBlYkXC0sK07r3aSRoyANzC4EBraE1debEPXkHzMhzguJXAL0

mmzSDGCW0wAxKqXOg52Xy5Cdo3evA3H+QhTQHDJBm5qc79LH4hrPnwXcrEwXgtKchuxOijjdqkQSVzp8

MEXPsl8xoDz9X1vNU0IDTIoyzp32gPvU4eQkN6PYEj
7Jez8A1fNH3xxBk8+N0iUTPs2GH5h59qVU6TPxixGzib5p+9OfkcabbhF4kGcsXGtPzebs5LZnaVlAs8

c9vLCP0brFJdDSYT0+WkAgQ7hT8MdJyvqq1b+QWJqn5BOLl2pAWK3LX09XjBtTPhCtb86hZHtw2xlY1g

tY+VM55VAXntWDt/LwtKJQkmAmYCT3Yu8PxMEzrlSE
BD054fwFShpY45X3tysiOcr/r0qtWSi7v2u0Ra6/0yr7HYjaeQglaoS7dysyovjmsybryD8D1dhwT/5n

CHssURXGK4YKVPfwhYIkSzVbpQThL5Tv9iebnHBNgHrfZxLjSj4qG7xS8AXzJv0rYDpn9Y8K+kgx+1FV

3drHQdTtcxlrlXG3JrKXWl27l1cHpyg4STI+7D3jTr
A4wQffuqjW82R+Jon/0H4OPh4GVw+AVIdG91PCUgyjgej4ppCQUf1BjRT1hsUCU17ubynPf2XXH2DMq7

pJrz/u/GNb39zskMn33a5P9Jgh/odBhoyF693ztPIijaM6wpO0UFsJnwvt3n3RPTP2WFi/oSSP3T//3t

pQ7RKQPjY2e/i6K0JG49oNpMtFw7pWH62XwFz33PKy
+fifFfkXtIXJRxrDK7cCoFvDHJyNb5/xbUUdee9Y8PQ0G1yLkiWJcL7gX9RbmUY8vY+hEygS10VP6EPb

z27o1ypGUtU1pXsO4Ds0eQ7jctykW/WzDV4hPNuUJCyZ+eW0sJaJ3rx5yDC28LWtEBi4L1Jru30V0S6g

e2IyCTwXkHzl6K+qp4Evq6g96ZXPFsX49wornJoriC
/rXV1pULA7OOdwKo40+td5iSkfqvDKyd0WHMwvnyfa0SigB0UUh2olpvih6x4ABQYdZguAm2teiQTjP3

pRf9B9R1YA4/aMyP2LYYO+67KFFivh9yPpF3rDTRhbzb85bZo6amLfwppj3hEy/gQguezAIoyUyVCKtU

yu7XlFlbzqbmgWXogNqFoJa+hinGl4P3N6fRd7Gze2
OwM+Uc8ZrrC5Nw2G6fFARXJIfAxb4EAR7gjlminYwrkRsGeshGXFo6l6P8/brOFB4YTt65ov+qZFC8s8

hx85zaLlHpPPsVRmnrg4gXwY2GMdk3G0vK7HgXprmL5kmCBqC+OQlHijkStniG7Z7/ScX9TP47CnzKO5

CHRjGDtbITw6uTy4Sgsh61Ju5e8CLlyoC35r9qnpuX
Rep0haDelr9v5fN1AJv+TQL3x9nJM/Rku2hBJy+P952/ap4MZ78BR5XoGTm3+hQ3hqzbkkpAB2IAHdxb

g80hjvGsuoKIYwNhE1Yuw1qxrAU9HIzCmrnKdiuDcAiRKLi93LHp7Dxqu1QjzOI4RXu99T8IGPyy2AS7

bFJ+ybruFioj5008dkjd+1mhAIJWIo0jbg1PT8v5A5
SBTLVrjkALJhq1/Fusd5NwrkcwbK4ZngNabayObRD4ft301PiBliwQRxddHtZGbcXT3Y5ETn5IHA2arR

vKCyHPDrhIUS/xWGm4rFDdjZ7W6ltfbKTHIaMECmAtE0KGUfIbfiw8M0WuF7/BJW7SGW7UFM7/bhapO0

Exj1VIdadkZPTQcI/3tneGU/ZNAjRinVkWA0IyE7Vn
qYISFZoxOcUmg615hG6f6KpaZwWqfi09sIfNEAcbGIf75CTR5TPg7eYW8251rdUWkbNN/Da3KvQiu7Hb

u8vL0c2I/f4pdiOwr2yODm21d0RvrhFFNAwT6rCE9utRZpQIdTdvxkBZ/qNiDKzc/mYh9jp6BAlB303h

mcRSCXpCRPqj2Hbv9lJ6lM9pcQBJPWLdlzmmWqICYG
4UliTBdDbpu+5sEqcsRV0aKkGo4nGeq22d83mbxxZ7ixmgbANe5hRcheJdFlprDmSZtOq6RQ+Po/dW53

wmVDorAnby6YsbIa8G3euezP+0mNd88QiKjDzT9rBkRbXzXv63MYghFeX3R6gOcF/0Srk4yZvViapbuy

1GBEXZeNk5/tY7JW9SWOanIAhl4hHLS28o2zo0bdns
8FzRKIW+i829+EI45G7b/QxaBVXr8/sZFf5o3IPkynNgdQOU9dgPZ5JeJ+RD3reQbPBszeNMCnyDJy49

0bZXMLKmDuSs7ibULMPd0AnCGkEhGUlWp/dErTSb2bCDdoIgIfcpWhJZztpQEv2sx0LSu0g1RDWp7Px9

+ga7osLKdG0Wh1OZtsKC24bGBBB8lGQRxzG2zqc+FN
pLLzbLRLJqZkdDVcHA6mHov2C1zJhU08stukApRw2btQFFbui+ZHmgpedMyH2amxawJP0p+8YEkRT9SK

C3BodQCWYPk5vI/s/TgjCTOFe6saFR8xfcncl42ATmeRBXBlNmlNs67mfYxab5tS/yeuD9895RJrwIzc

fYVGnYjvfwmoga+Kirk+1j3pcwp19cjqsmh/La1Gt0b
9Mqj8i14XLflV1K3u1SUHWsrIDGqgM26FZGnc33Z17H/gs9aCOQlkWev1VP4WX0k7m6UCpDflEfI7ndd

4+D6tVwVoqkJUQJxKU4sCR93unzGzvb7Tr4fVTROscvRgZMWzWmmQWj5QjH5MoSojKjKRH/zzQeNSO31

U8ukzQHZ3GZE9sZ4m1E2GzlbOPjs9WGZ9W2+eie2kZ
ekdMpUQpL7LXGdmJ1BDRwD3OSGf5XZpx7KZ0T6xrwV17CAL5Qld3r1Ulw2MMcjR0aLAK6qy4aBSDAUDI

62HHr6Wf7HcRgifmb1fVECh+rNad0/bv4wnd6U4SGiHG5WJ+lsfQgLNdajLGQvH/UoPWcb4cpf8ji4Ni

GZ+pdG2/BPwvWMmnE5ObVkUBOPXqouiWCaM+lZ+KA3
XIYjuse4e3iV6kqhsu7BXoZ7fd5gOeARzupQU2qHn6uz5z5Um+BT4aIAF/4kr9UW1b0k4uGPaA9RFJa9

UmGuNrY5aeJeiRrsaOXbjoUd65GRtk6x5Fyifms9EdslgyYrZPWp1lGpb9+5Loywps/jnLJWm1cV+fgH

eSi9JFBRgj+5E+PZKJV7RhckpWU0T4qU52yBNt/N+Y
dvRu0mJLSTa8mQrY5D2q+iLX7rVoPDmUnZanV4TmVTmvAOPmWrOcZp7AHRceSUlmm/FWaSBrMhIGFz1O

rjaW5uNSZpXt1ch+GWv/jyNRtWaWsPX4HlIKaO9Ba1TFmn3/nEXAuZ66hp20Cq5ekxO4Se1E3aEPITKX

eXT5vKnCzz1r0epSyr53Cw0E08xKSuao5PDampDJ3D
KksrSlrIPE1K3dfq91sr6wpztPHjuLz/zP060TJjfsQcHfyj9G5wIPF6D/XxrrHBvG9OeoVWVh5o6KiT

Qs/G3OYcpU70RkIavnHWnZWBtRECJ0PZQ95GwcnQ/4qAN/euGLTFanOj945YhoiXRt3I2DyMAm39JQzv

cINuHbSdnc6DBl/Jh1D5nmsecdx76N1ReA2U11pKDj
yw7u8QNVO0LhBgq7J79NpanD1ltiM2NTWinhcYYVCIm2MAPOkH2P1GV9/UwD5ADsKpjVJvy8oK+E3Xv0

4kD+M1pbZ1Nsxplvd757Rqoo7fQ5abeNTDedN3h+AXTGfXbXgDjmuz9Xg5M7t0fAsXQeuYUDxzI23DdD

VAvAsydBGGvXkTZ6L+5CzWmXUnk5RHNptfsHBGbHLR
uohUAYJBlYeZKkScU9jvOGyAdm32glWYWvinIJjKg+G+RGBPiULZnrfrkomqtMqT/VA6ebmYW9Ey0biz

xFEtmfT3y7EILs7fJ5395EaBr9sCi6Rek9MHN2tWheGpsLD1ns/ZRsjQFCEUTrG9EDPWrlE4/B9+9ADx

U81BNiLAkl4R2CcEjtmtpyVZBryundI7uqiv4hWBGB
s++IkkjBsPxIW/yf6zF+tag7mqR78IYhbgJ1lByG695B9iWGQqT+rFeEc2eK214tfh1moLj/W+oznUL1

fwFOU5reTnf43WzvPBujtuEvSQr6TKUyCoJQEciWijc3GmHn6brikDZu520+pUSc6fk3tuwMCdexzVOl

anbwgi7oUFk0+bpT8142ZiBvh0wJ9OvP9tL2YR/+0r
oSlNCbvkkrmgPM4nIAWxqZEwb0ORGWU7xDTxAhuVkPauhTFw3Rx0xIv/yW1wM7X09+ptIdUkKbYAdTzs

XIhNGNAB+4Qdkcdn1CtdcsZtlCb39I/HiUDI6W55FVOzRfcEsnuy+1snELwAiq3M1gbpYVQ+ua1gzOi2

jgG7cDXz5toL9d2+9jV/wNFSru+fz1UWh4V64ZnCYA
0/l2JCcOh4Fby/49Y/SW4OCxqvOk12ysz5aNx2j3Bg4ELkCYAeY0OpvKzh+RMR6wIxdCAm+chGlJ92Bf

jHSa/MlC4kziPwy9vBKyzjSUG7mUa8jZL+t/tfBGnd9KBoV0K7T44vXCQMxdsFLTaGoANliw43G3/rdQ

bsXIp9GJ8n+SpssMsJhht6zpXFQMPOCjufF2uOp/RW
RbbSwZ8FbCoj693SWdNVhq2BHIyTpvjBO/IOcChlsmus2gDqExqro1dmDy6caZfUimznTYvnwnhoeN+U

fbz0NoHDM5A08EsFsMtueAwiFmjzMkyPkCu9GYa9db3vqyVyCKn5XKgq7WLqbxrz0nLinE5NaWae0toE

MTvR+OA+PFd3bhdd2GsfWWMZmnIGM5iLFt+Nm6Ufb1
j8xQ57hCzFKJGxziuo6dC/xtYV31SAmvXm+/EpFZKh3dd0NqnNdQnAYe4rX0GORgXHj1GGUoWqLoGWSj

UW/AXZdXUie9KyrmxpJhw5JxcKBClN5SJL7aF8Plcf4CQMDA9/r1lqodJn3kG3oZ6ZtODpsym5qmAVFX

leuczJdsJzkVzI6KS1KR2xkBrsBnsMwG7lbJNvqJC0
RjNm9Kbgp+9GgLMDPQ+F+Qo3QWaHk0NwfP4cC2GCu4kjMR1TLBmpzv3vL02aHsy15H09ykRClk9ShJ1L

dY6nN5sQLJA/gSNnMApUPaz4NMl07nNzvCg5q1S0L4tj8HhHlXkCoLhBkaN31zVo/0ac0JzgJIxi1q+T

bNccpRsBv5ql1QY80Hxs5lIAokDSUbWGdOPMLVmh58
bzjflgK07YZWS9Pnzz4D4zqU3/G/05T6iWEeoWhiF5VlB2BmiDRy3KXNRlsCl5TLOO1PRWFui4blefU7

BpqXNFfegnm9Virlgpsdww6CQmYx4pkmOHRTunhvVio9UvBii+s2v+rthu0rUL/OtKuVs892JHjPWghb

jk7gqwrphNaisUE9HyvHy1kX7oMTIfz2WZ/L07Z4QP
pM7eeqPEezgRS7I5Pdh6P+bJj5dRlQbNuNbNQss995nVc8dF6sYcN4pqPlXLNkmcNIUPISjcLHJUS6pB

iwPQ0J/H2JIErf34XAbpOR3J0gQYO5ZWtYTY1Q197kJZQYSKolnIe5ULc9yrijCaZneOc4DXJBfTUQ2E

OGgtxZWntbfWJr6UuG8E/wc404PENdVYW5uRMH13g4
Ke634BMMIsmcAXehQH8XgRgMb/2sdk9bY82QMypIOUQHURzpRGE/oOgH5JDtDSxRviAq+ONnF5Qw2Isg

C08XfDXuelTGYW9qZJcwL3jflLpKOQ/6iKa4rfzdmnXofkqKJbE5fZQrUjiVgHz1+9ExMoqJN8Q32+pL

5PhYHilBxi5neRJSpPn6FHz4v467CKKkwaFfhBbctP
ELsBKoZivSKpTpL41xbNkc329eIIHcYQZgf5hfS8e7kot/owhmvUl8fkU7oINvmC9PIt720UZ3FgnPpJ

+AzA4XcOtH6WndcidxWzjVtXXvVaTY91DiIIC/rIxy7uXwhaZbRJBvhnAVlBY5KBfczeGqtjr1m7QdHB

utisc4+1YrLaVwb+BgWF/ipDH57Y9rpFhCHq7AfoIs
QmC/pX81BNbnP4erG+J1xkywKZgC/Rd3fJw4CT/xEYNKDkM0IDqHoQqqVAma0vXJltWNeFbzjVKVTWli

AIFVv3+mtK6qPXAFzMiKNAacKk2rB812tkiN8NRDCCkITkIQBNEzz5Cp28Xiw1NShS3y30Jve7mMVBmW

6J+xgEinCH51s6P+lZ+QP0ursucw6S87Eyif8Yazc4
N2JtscLLKwMQo2l1p3gS2hNiu8s9INbyvpVtP7ePprsnrUVkl4ktK1K9P4I7UaNVySavRgIubZLTcL/X

Py9dswrovRV7yTg1abnazUNlGocFLcjQIZERr+2YTJ/4QnSUhF1UYMuvY/qjvMTWDilrHD160+aRSZpg

vT+U7+hSZKjAXmpV803U01mY/r1KinHFtEUvVGEFKB
VXBjhR+zXelS3HQ+UG30as8QWQQdCy2sC5r53OfArp6oR6QoU2dDPK5zIrQQmU9l7T46GLANKNk2Z2GF

z9OdDjxgMGjvKZQBqUC009uFvqnD17ahvGx+xubErBpiXM8jyY64FtNzdReh8vSqCKgBdvSxxntVG9Sc

t30SrT1ISXh9tkD+j4w4c4s/y5bArvQ+wCYpC3jqcF
IogUc3ksG97tH55JBknz095Ks+4lu416pRJBgprSHyzhKNudSx0UP4mk856ZO0/W6K4P53vLk+21XF58

2qnM2qCtmQn0vEioRESzRDeDZMc+a9PY6Adf9ardZ/Krik+L9aQ1kW2V8DzSpvdHfRergD8w5Z7br/mne

tuOlvYVgA3qp72wI1HedEEmefVwvDnVPM0Y66ryMsp
gHRcQa1S9a9sZdLJyPlOeC6dCqLc1h3x/S8ZGzvIQYCmXTktiuElSGy+LM4N+iASQtbTWIaMDIXKeoVQ

PLkivo1cyXmTgz9fi/drx3CMc7vTH0+qfc8xf6enI57SfTW7wjcNakMEBWrdEIJndx5smfKONg6zxn3E

FADffSp+wQpzzCoTjQXGqZzo3mrmSnrYQJKcNlBuS4
lWcTvd6/ZENXjBz4Pv39gy5/QeITYq37Pt78lJmU7xtb3oxMpr9jNb3T03EG1etvXJU+8UrEqEiht/eH

1XTHx7Wt9JuDHmBv2ZqORAoGfVp9gd2raOSMdbd7nGFYxo7dhgQFN3njD7hh12yoa5vJqBza7AeINaTQ

FkcUFHlwPyXEBI0NXZejdXBZpTuio41PGBADwD9wgA
lt1igCriIqL1YheLNNh0W2OQb0x783QaDbGoS1ek/uJyVDs/Z+ClVWezT3AnM9y+pZvqgc78kNn0gvSi

aLACTavCpRDXNsgbDd2c0w/mJfex9cYrROgxj7PR7n0UeRrTakoy831oTkTmuKvyzIqhl0pl0uKlaD/T

SwjRsEzlx0RMMH+nSc6G5e1ChwKZGEZ8nfey3t7Uea
DCDKjK4biOwNM/LJWsof4w9DH3B7X/y5pFItoG3SFF8YFyg+a4dKPbZr0WxnhSy57RdnYl72OS4G+17R

sh/61swkVvpG+xxuiewhgE9fj68SAvPqqXyx7agfhBId4YtvbvxtvpEF8TWb5Mz4kCkxZskSzGiaeFeP

yFx0daa2m8ZSEt2xUNg18JcFEBeEOmsCS0Vqsp+lYe
iTXPk0CkY/QhRgjykt7hhidXrV8dToYn5W69ImVCMdj+Kk26y/YJIiCneo2krxsxyi4cJtIDXuQjsc3f

IgWDyDwLTiXHy2Uy1n7e3GST91pbd9xMnds6dRYG79ajqJFkIM13JikGWrTLDDY5BhpD5RmSLOOHSxzH

N1Y5q5v4yUSudz+/Dxs/6INehPRXBNNJIGjuXfKU5r
TPkrzsSy/ZOFm/u0936i/Z/emZE1O3LlbFv8mDz50uBRbS6gk5gwgAeMgFcEQ4fka4W15h0C9bRfc2pO

iWy+lm831R25x1jqa9nyc4FNVam4w//+IyjKzKK7AQgjeWx6gXRp/+eikCOPYgOcT/49PCDJiNdyQAqj

wjVTvhhHz/XET8A4W4AOTZsOegyJuIGwmw2EhOIb8h
PBtWEwFjxpdiNHCn6lDVjlnTQoSFx/WOSkBVclxTi0M1juIntGkcbT13RYacvEc49+4p0DBUGyAypOuL

Ok+3yE70OaKjULALOdN9esoofCit2hOB67ajrP9K6pQKC0SVL69aawz5wqWZ1MjOB9o2IV2WUViJKdKW

B+QMB+WUA1oyKc4o3B+jJyHNiAtWkAb1inhp77vM7g
870d7TkbHw0VxLIn0Z8z2HIi3F1m1HsTGIaaxeqM1h1EDwbQRHmbAanikUsEeIUcbFaYrsnxzgHx03vb

KR8iyQIVHsE79xJvBv8GqWnW3LCJYSOb1Re/JSrrroeMKUVJEZClxplWF039VvEhjJyGbjvF/h3k/pk1

RygAayaqbBjt52Mh188yaT9qhzmmDzD4hHUI/xq/vf
6b0cD0tuYmXTueaqbyd6ajCIebyS8VsxYuYHBDI2KMGLFdwTKFJ2+RUwJS5uJ9eIU/eJDr2Av1wYzwN9

s4NkEcX9i39+tukwzMUI5H4H1YysrNodXne64ErxWHi51Kuy4oAeH9vdcqgbNzu7LPz6ibCP1SQJRPqx

Yc6Z9JZh//CggHkRnte1/qb/0KSvxNIk3KoC4dniRZ
ur1oQG/YiG141/Atx4fsgid0E/JHna1/1Sq+qVaBTYyw+soJAw78UwQOYxVGFIHPTJmwXgMDz/EPu//U

pL1DN3/Gf64TEaldhff+pnXygRfXmDJCEj09yjcd8plZRh0mWbbWY/0Tjtb2Bvo1R6p6iODPi8pZDFhQ

V5/xPa3IQnhx0LriU9fFV3zEZ99Y/wqtmO08LySXum
bCwZucTVuDMr9jNNb621/Rj3fpliuozKJsRokl/Eqz/yfsBARjszkKT4VU0UdsBqJkQwukuOb1/Mri20

O0odxSxqGHn0RWGNH0K2nMc25/ppoDdQZP6XKkdL/BKjWeo3+gD+ji23UAAGSu9BV5Un7IT8k7HuPHmv

Y1uFwwjZtUOqWb7MqeMCf4uMVFLmZ9R9XXKYy2ysB8
ZoqAmLfLFAks9noAcxo4+siRRn6oO6rsh76RN6BaIr6IBmhYAtI9rNd/8yRMj63h9QF7CXYqxHGxcJox

xgS9Fd/hXjwJ/URessSLZAFZOuPiejPFQ/4KHocB4SzzxEYQIe7/cX+kH9jBKZOqOvl6fYB4ZAnN54s/

maj7eJIEA2aI5tiP0/IS3u2uMiXstucgEhh1LZZxtX
atQ8+1GKq4+nILIG2q4yDv/hG8KcQJHTOXr66x20V8/PRwsjT5tgHjX2H2JIuAJtBdEwyio6RtRGsqmu

9tSMWZGtXXSxeictfs8eaNaD/bBsg/n3h8eo/+M0ynOOGxILrorudFKPuBNX8uPERqmWy1T125Z+V3UH

47PaKTrxchpJ2Vk3+CKlumJW5m83g2laro9E+xmGwJ
rhcAO0CKRWiCUl/vIljghRy0auV28JzOrU+C5c0vjiKh5wejxrVfWU9MESu1xGxhZtV5951FqtKLjJGU

Wf4DrIt/xvnMpn2hbbPec3UBgdULm0BnTEX7L/NfS7RX/pjnC57R+azqhFlyZfxZ4jybXYDF9u3ytYHc

uyyrlcy7MHRAZrUzNDxmbmcRX/3AOGUyF+3OxroY1E
lldk1N1wHi/PRgnJGpoNJ2a72qJ9pd9/JKlG/0d62gXbg+spQ0oQnTkzpFwinNWYz6oLgkgVUB4k7tut

m/7klX+SMDzaYFtFVmPJDqqLOsU6PBS2JFZLJbwC0kAE7ivBEggX1MEpAgcfRvmG68oDWcSHUZ5/HTs8

u/an/2o7yzUdYdX5LgX2wVAPJ9vkh9hLMCsFjc7g91
G+38f6+Li7FdU2b5ais9/OVF584fTM8MoJ9LEOzZTejk47x5cWOe3McqNwcEKeeSEU6VfHfMdtDlFZ14

9BPacWsHUNlBhJ9QjbKbqXi75pzjHvS3wE6l5pxo6anWuARjdR7coDHKyZt4X0YN6zqFeKhJcxejswMz

R3hq8ZCKuUYDKrKDGGZMrQUBVMMLNfqmjDReD94TTU
cYgqAGBS3eogikllK8YPcgHQ4xmESYB4E7dwb2ifOwARR/s/f8zlbhIaAydwN2/3/gcOX/y9CPL3Rkho

9QDC0rReUoL9EfH4o4PKVnOuWz8gHRGKWSndaao9/3Fy/8KOqTnBBu3lxefvp6/B74NbbfV+5Kz3B7wQ

i/zN6wN4ysZGXmLNR2Y1hu5/FWJ/rGbKGiGd0R72i1
jsRgtYTRoRL4X9YjDUo8pPTQr3OSIZPdah3rUkbs01085WD4HnT5FUlWeI4RChjH93+UI55fDfI3X24S

oKg0+F26n63aXKw83ODD/WoJBH3+wGxw9olih+naJ2AHDzuZy50xjp89qWWjOZQXgW3Zu0XSDa4RJUmr

7JVCDFAse1y0r86LkKdiaxs2aFXNapAzXSlD1pLc+E
fv7WJxdP3SPx9lWInk0Q/FieqebK6lixY/Z8oYEbmFvKoGOLkXR1ldqkO2u3s+8S9KGyCnbBYkJrFepr

/BPsolzhy4vwMMrUQB76lhhdn2qBcunrwAZzQ+6iiu3aRx24+xb9krslD2Zi8QY//pjAWK3mwTCHop6q

obXy39eBqb/Vqz+dGtlFnutOpGkTGX+Zc0ZhcnRaNn
KrhI8vq4HpU0Bnskgwplo4MXPE/L7AbkND6Mfgy/mHs0y7LtcCA43w94/M7jAMsbOrHAO4RVJjOEyOWi

qAkxaCPggXxQKVJti/2HR05M5SJuzUIsyXh0yubq2aAUXTax8meSFxPMS2/M6Xa0x7zj+74dUZOhP56C

29n3/CjXcVel0xFOPh/SDP7+m9MaGd7X2fZa6iFpEz
uAoOWfNDmI217F8hGxAKs8q/u1i+9NGxgJtGJJkV/nj+Jv2QsmqyeHS8sulXq0VgM2G/Hx+z/Sgx5Pgd

9y5EReJ3HAkRv1z+khIIomEDBZBJUM5kCoKk/pPSJqRPDO0vW0+0Gua+Luc6cSXMvx1bjyVWWN4MbYzZ

ouimvqPbCmIMsXcDqqavgW2Zxqe55xbAaA7rprgXWf
+XE2r2UV85BZ54AYkfxMGFGbGuJc0YyT3QwVYf0YRgZaEU3eiaSK7T0S7oq1GEyVxjn2JheBgg/jcCo2

EynqFaH4Pkjg17p3VauTHfEvSFw/131YsN6ss0UbG0Ody4QR9XVbPfk2J+M1Ro6WloCYgOC9areYccVk

N8oFaJoruG6dShe+rDrx4QAn9p3Wutbi5su5k+i+Nk
kBFsnwbDmHmBAv+wObK1043NDD/HqTPHpaXJO/lDMdwkPIvTMxLisCAvuvU5rM8Z67GiOrKgZfAT8Hud

6y4PawSvhhrtiYwz8wtBdQTXad9kv1EI3St8PRwL2lOtO2H3I+ErjALvIhloI2e7symibU5E/PJnr3ku

hE6CkZpjLHSxFI6uPZUET/ACin2uJ/GU4mBYWCmIog
YAI9G/VRdnNhBYaqN1iiwvVhxCWqVBK+3er4gF6Kn8UvyXfUqyNNrADCdmKdkb89IatOZ+W6O76M0cqy

OP4u6nmkreRmR4MS8YdNMODThJzZj5vZvFJ4BMo2vfXaotj2sTPvnMT5CoohtCpTiErV8K93zjdPkVDp

2WDCRZF/9WtS9OQHMFHT6hQCxdwIFWWdnoyCdNrtnE
kI4hKvYnwE9RKCeyonVcUjdo2P8h5W48pTpAkNhdnuB+rpycHXwJKHzyiyEywlVis/JcRKA/39bDlPq4

TodMAeF5/x4oH6QqUKDJAbz1YOKHyyXJAgM3wvJHFy40C55ifoelQzBm01g0aX9IbTgOmqCgWNaCKuXm

tvhXXDeidHyMsQeI8KFz2LeFwafAMHsQg6SAVP2e9y
9H1fePWOvh0rttspVRZgsERL3j2ALCii6axi+Nr8gg9c7pdfOPDViW/nAKljlFl1eS/b12ARi3tOkLbX

Z6kXXtNvLgTU5cyMEwYlAOnq6YU+Ahy2Y1rLqdCNGejtZGlFF+0CLs6bOqp6Y5LRIFkirv+a4P/oUk/k

ZIUH/fdWOWTS0OTz4RjhsSEQs23XzoG5lWBzC/8OEH
HTI0CjxdE9+QfWmCiG76hMq4+9lq/DrlpK66du7C+Vk/nP9Bk5Oq+q1QqdUYp+G1HwcRc2NqeMPJJrxW

k3X4J0TZKdnavg9zfM3fTPgmtOsVRodSCAjLq6HigdHZky4ZGmq8eexr0Ldv/BMuqX/lRXwWYnnkKjsy

ZB8SocwcTQ7rP3oddoPAcTDOzfId0jqNDNtyU0cM8O
/8hBYVJ5Bssy+/5fM6Eivyy17djUb1PuIEpX1l98Q6ALqL3IOmCbmwVhNnooN2qCf8l8k73eeMKbLeo+

bAg3NXRID/EviDTb7RXvue8ooHZNTa16HclM0L9KxM780QcUwqEL2W8j5wGEaxTrcigSU1dOnPx14Hbc

vdUuMFdl8eYSNR8IPAhHr40e8oCN7Xh2B2DosKYTSS
nEyIl3CQTdkJb3HOKPtn9PlCXhQdICjTp/3GzhUiLYdP0UmDxwq1QkuS8DYgDxwwXusLCK5yLha08WYv

I0QsSq3OQOSHAVxHxFmYDZf4/0HsAAx6ReBp12ejW65EJD8JJZxLwHjMBZO0qej3JDU94R/1WxY4I2G5

7TUfpe/D0GdL2C9R7/wO4U04WdsCec8fteRA8ZGrqr
l4kPgfs1f2/XR6T5HRZwtK4yuA/atafGj3pDTUQLWfSl2BAS6Xrgb9avpr/+fweFAC4WRd1yxlV7fzgx

+Y8BWAlCar5LcVnimLZ/FRx7sNSAVmEwU9uKyMpfE0juBo/5KooNkFmvAcDGxSO94SOG8dBDY9X1D+dR

AneUzP1dR8YfIf+VsA4Fj79X4B2CZFT4uXJFUzymSa
CnGXNOGQbnQOy3fnOa8CdYlaUwi8oRTsKiaUJ/3QfZcBbulZ4AUBxZ+5UU0lCSEyzG5Ll1Xlqeg7R1jx

lbsp1ui/wPSScZohR2PG7k0kkGcQJYlTq0kOME7D3raLrQLrosJ8fVEHeum2LYzU3xlDFOdte5wXBB/L

nWHfqxW7j0Az2FPP6XNtxooEVRaHqzhVGJ2leD6LH0
Nkn9CuU0WzvmdZ55cwpW7L+rKUv33borwsb6v02NKYM+fdWd1GiTIIIJaSZWgskvvJydOqxMTauPn8cJ

+kn7YFxx78BCT3vApkjyTl8Kc82mUIEZVhnHKAv1Ris3bJ/9uiD6f+8e/m6HqNNvN3jBCHluuH04aOKf

geMdeR8H/LWx+Up1DqJWu2fLM43aZc8g+sRHlr/Z5u
eR+cN7rcfYViOipVYzhWatfA3UHEOwI8nGn94hoKldn9vvfGiLj571NCtp+b/s9VoW375JDNY0kDnmaH

7JAMALKv+zhGcLhSpumZKDt9IU/Sc5zB6gLozhdLV6Vi+S4Kl41gPGGxz4kBprm7btOPNT0HqHAT5CbV

l62vymIdxqkN9kBzQnGN+66GrYdbDhhX4xugl4PnKv
O3DuNyzN/9Us4VopAlJ9PYnRYzBKcbu7HQ1qahn76CYjVVaF+4jizhfk9D5Hw7ueqg2Oy/dVJ4lc0e9V

AfciIE0iQrN1zNq9lP6py0u6NPb3mymHG2P/2+PE8A1/RVkebjzxqZMnz7hYrsdtpQk2Sz032y/BgiVe

mHfbyoY61ECwR7rwLTndEbd+euoDL7MFwFAPdXzsIy
bFkI12VocWzXPSPHcIO5XYpApK98B4s370YTZAm2vwxyOhLqh5KgqoxO0diZ6ST5C22GHgkULvbqkp5o

/B2XYIza8/bi3/bBA54c6rPvEhWdHB1mnSG/Z2Zl77rpDgeimCe9xeY7/1QjnvUyIhp6rX0QBwafIe8R

U3LkmMVPSo9qlDfUo3phja45BB/PQ4Ai9gVn0OXuQ5
S64FcbAiQC5E4h73FnEEtRsShpqxK7HwgS1qZXosCQkAr3j1DNFD+UMs3xN3kMMPThcFs9mZl0NNVKq2

yqa3rRNqg5p+oOKtD2ryqS7qCLyTifzHEzymPM4DY2PUP+PedwDCouG/F4WzF2Z2hFAScgmR19/i7AsZ

PpOynrx4kYAGhlcoHWNt9aAK6c9xrtzoY39GSkSx7q
XX9kkILatKlMOD5prvlU+w1mM1Z/zw4Us2YKupccazai6eSB808oDiMd5aNzybp3n4Nx1jLFXZMO89iC

Fg99gE1c3Xwt2eaZKpjGX8rrKO0O4ZgsG+3Va0N+oCb8/Wcr5OaW0DPH2MsPlm2L/zb6HqSce46Fkttm

EgO5lLac0adATE4u3JgAwXQAOG2QTnMNJtIFhcphAG
zl3TNcCYcdfG7Ckxnplw8+bs3WwbHn8g3yFJ+f8nvMeh6U7PfyUL0kwabRCu+kdsRC8SsYL0KX/Ekca9

WZX+T/4HkCRRmbSC+5OfVwHlCVPZkYBZoo03andzhplU/DIWpED6njWSvD7Dre9DeoHPc85yGUqW/HUV

2LqnTvqKm+2frS7cQp00mD+YBtLHgDZ9cA+4dklLxm
+z6lGBs29iRu432F1Yf3uP7CBhzYoO2gBCOgt05TSz95pZE6Vn5jjDh1vAUB0ooiSXl/6lERYBrlSOtO

hqZoIfcXMbYETC8+o5qyv/GEv+wIXXqPjtwSZmGErUYPdowHK9pDS5hfl5eaQU35UAwXL6Jsmc8FAlrc

YaftpLr9hKhbyYHR8TqEfyUpaOiP0Nj05JpSVz5S64
Oszk4zC1UbvS+hZbTqn9jNIZs4AR5Bvfjtzqwm/E2rSUz43Ur9e86FfkvDt4ws1IULMiTOxyTr4bnoqQ

VZZ23DgYj35X0F/QH7WoZV4TK3dPZYaoZbBc9lf0gslzA9Sz1iPuIxMQjA7b99G/wZ7cw783B+2dR/zN

JRSy9hGIwOl0fhK73yxJd0zZ+4K3Lb42hUlIXkoKqz
HUJX21sECyc+RuZr2Usnngr4946cbHK7WcLGoQ0l1kI57Tm0r4enEMaiMKRLNAKFReTc2/pc73C86WVs

GPhqOPQ4WFDgCUD4vxEiSofupRI4gWdrDKE4w+RtkI3X4drv2HJLuKN/KUOj5fLqvJZDwCOpgJOWMPjZ

uq0Nljj2uf9QmhJwzwI5tCuAQTj1LYkvlp6b9aXf4M
xha/mO+NQHWZ8tn8u6aNOHKrfzqCk1kdNHCho4jQei+W6u9h2WRDUrY/yLO3pQ2xI/sRdabBXDVPoXs4

JEpflNXhLI/41aggPPGCnsKOLYQWlh2rrCe6NOQj7irnSzrPbSuh4b8Gc2Jq5YWUi6qEboZGrM3VlbAx

bY8j/33WabWmHqaKl81pDJRqf386wTzELP2l/RaZ4h
NVE/dtm2RLVahZxItWYa6jvkLeeUtW7w+lagBnuv7IPDy8FAIIoe4CPZ4N0q/BcRdkqT6ZGa/fxr0C1P

svy5kz7pAnPnYFySjsX0WxMB2YEuny+u/xjFv5HNRkJV1uwjOsJaUp3ugrBRILs9MYZ3L2NFmDTI6799

vRJEiAGfzdFy1d4VT0LIjm6ZRwUxWrrr3MmMcxWF9M
uZ/Coon+0+RQ6mzcr1SYUuKkDcjb0D3Fvyo98FQlWG7eP64OAivEY70TXfduyg0uXyiu9i4ZssRC3HuQRc

GDyV0ik0nsQ1Y3Jzyqzp7tubepJEaEFhqI3gnqtenww9F0CHU9jIYlvol+1zKOp8ZMFf7krevGlRwYVR

O+u9curZsPO9oGzpnwt2WBifSnPkWYOEEXW73WfVTa
hMRTpObLN5Z5m2cAzdi7ujez+lNqxxN3jWcjjc/1jYJrqjzwfqKWLvj7RFGQ98sE1Eacj1Bek+iQoo8B

1c56gP5U21Ol+tkRHTAk7xfhgZwd+vOpWjir4e9qVu65XSRLhmsy1u4IrhkS1J8Wwulwquxc/fI4jbRs

3Rbh51O1Z+enFJMgzTdNCYu2WpfpKA0Iw4Y0DhcU3D
SkQYSB5uXCMxo03nwlduGV4JzZmEw2yHmqgOmWbV88kIngpo5EEo1dEaInm4u5yxTgteJ98j/81pU6et

40kLN95DGD97MjscqlVR1Wo13y86IVbGgSqgLJSnN46QYbaTrLJMKgLQMiQm67jRU04Uk+lwsez447hT

tGA+vjj3tdaZ8dZJEc+ayy118JwuYTpsZ6vcDoxE9D
jEg7dYA6wYmtDB71pZHemVXyxDOSYbHA/616vVd161NsCGAPJrBpSuIq05ZPabjMs2Dgl8SVNiaMYR5b

0YDQeMXxhN18svEVkaQL4I9fa1zaQ4ju2vytQtRtqO+1bKEpVNirGTovA4wC0f7FpJXS1j+hxMH3txUk

MXR9tkL1gY7soaHx8zcvwtqB054IEkOihIw0oGiKRe
xowxWb8sY44lnndmN39IHdKPKt2lvzBMVAjkqS43wa8/DwWneMw6i10mDQpDOW5epga8zu0h16DPkgm5

9kV7YbDe1nh+YS5zIbP6uwoWWfbZQx/fmngsy/BNx+gyN/bectMFs0o25biSZgkv0OU084/OYJEXFYYS

1Zv6XNyOX+TKooJVoyiCRZ/iqy/znyFBVbhQthd+ps
qco7PV+wiIj/wgNXYCofZGTjkHbEG6pRzWl0v5otYI2Mi5wnjx0lcoYr5WBdl17fRcfWKPTiWSK37GrX

J5iVJaHDzhqimdS6anaExLoNigu3Xn0g1NzyZHG3adchwb6CAXe+K6uCLZalsOUHyALY+QXEvqxQ9wMg

pb+xm6nnGMuxy6bB12/z85V5rbbndjh1aaZPS3/M+I
g8y1wxpBs0eXQw/aVQAY/E0n7i7xrwyjya6U+Vi16Yy6KvdBu5gsPsQ9ROakrxhe/DXpFeeqJ11CkfxS

ES4WEt9gu7JlC7dPYoatPkTPQ15Lu2bF03Dn9I3hPPhc4UUVgHJfy9sk7rCMQ8QMpWAnIWFs2yS14R9m

U4yzyOrKx3gaR2RTBQes6rv17s2GA4fD4RjpfVmmry
3xJnPWE8J7GvQxmzZMjgLIWttuMwAMZUej/LkRPkhTVxjL2wHs7cL3nH1jzUZ5u8ZZs2+lSnrv/TbDn0

Net6GvZ1j7L3tlxs7uzLQEczDO4zDB2raxnCIA10fKFQaWqHvdhSBmbNsc7abbvAjD4cb6efDOzxr4nY

VnN3LBq7tvXkVh4UBss/Yu2jg7/lU9HZac4iMswfo8
+Dj11+sxeFNMc1JBloWhMyVJevV9AhQ//7v1sfIW0Jbfwi1LluciWD/Am7O+aDMaUmWK3jkydE6HtCzt

3jqvzLMruSou0q++xZBeJLsLXucGUNBVu4JGUp96Ry60wfUBhBPrlxGo4thxyvyXRvYP9iAY6dokx67m

b0rCCsYJbQvVvVWEmz3WdVjYtKqq4uD6KzUr2adzBc
TRZ0mcwH8IxfSz7knkpOLf1mSptHtc1UtqSUTudG1UTlRioN5L1YQ4mnbt2ezooDKBlXy3DRYh5ro+Se

G+q2o8K86XMPFhFtagJJQzHwh06jVm2h+jMmdSkF4jKI57fl4K/m22Ff1rt2p1/brT/+34euFqxTWgoG

vMGqJu6bxTy7uuLXb29N2kzj0zxCO4cf9rCLh5qZBA
5rDCcMqMWdoSILZBrJT9RVvSQt9hemEC3I2CdPOA14XmjscsfN11tZS/5L25H4t6C1bjROOJKd2zLd7+

Pp0ZkhkkzodDxSY2gkF16fj6b2sAow2LlyPVc/bonHsgItsZNqevnyMNh/IDPFkZvDHffzkli8kotJJv

NDf/4GpaxD5M/QtkNWybMVncv6cW65KwgotXRx83+B
OcDrcPUETVHKtQ8Rku9weos9hpFpjBliXAakFUpchA+eQGjqi5G8LL5XjaSmKRFZLnCuCHKuMY/WlW04

r3ozUt/QL0xA/fcCR/kcwhOI80NhT2GFnbVgCtFgegTXLJdvJLTsmvZJvycVKK9fhTPYm/Xf4RoPWke3

WmlfTD1S4MRYyimzBmTXYaiW0d6FyN9XqE9M9bM+cv
7M+5Pfm4Q35NUqt9ZK2Cdp6Pmua4O41+akTz1ckZtIyCz7HX8LYYujpL2nogpFkNkpRJU8go0/PU7KoF

M+KQKpbxqwzFksrBXi8zm+pYsWW0FZak7z7Q0F9aOxG/Uy3vaBhSRBiUmIiHK4AfJ/5NBlOcwjweyAh9

/itPmD3pso/y2Ym9dfpOQOhUqBFkzX3mxvsgOhdFm6
uDy8/iYHgj7oyz42Mn5zCGJQTIR1qVZwgSERhvZ5fo5vTIv4BEQ9yRVfPIA3q6fsElKboaUNNd6J18tH

0RWorxnJtJEPHq/bkC8Plsg1m+2LpEckIX8JOpppaI5pztDVy6bKnr7eBFLXgfqV1aONKWqMQ7rqF4gu

3t6uX3cTjK6NPbmfIyhkmLB4hITnMsjRb2CTeSvwu0
tdQIcQdfb9BSvoN36vN/wYC46C7Vx7GqdojnXwYqfKgzXgbUWGn9/cFMFyhpexEf5i1uqe3iF3o+Vm37

9neX57cRIEGDfFFVJ//edc/Nj8SrlBegatPShG1Sof19C0n3Zm0160PjnUktpp1S8FuiL+4hPecg6dK9

j8xw/x4vbwMeZHU37H9igLP0qM90C/9d/6iQvhyCr3
LMZHCnYKk/0bhEJof4d5r8jDGKbaMUFrleqljWOwtYtmapowlngk18eQ8nqLIjIKMTBI/dinqxmeSXpC

Njgqw6Dk1m5AbCIo+OQg3FHytqd9vjo6Yh497+dZSxF4zoW1b/h8SeD5culjW7r1Y7+jrm+/brSfpPmy

gU3VWbiuPg/o8Ghxoz9MmIGHo1T9gvM6c/1oja9xww
hXhsMXih45ZjiDDFD/eo7hVl1IHzxPqd6rX4EpzINBoNhuM9iiM9skr2BBkroCCCDTEI3xEgwbb4G2B/

4LijcJ9cBH4clqdKjhwhXqTEggxBcTDLt0oUxjUN3sT5buLQV2EC9cXgchaEhjpo7uJN197WXFss9wyr

uqNH1v6hcPGZXx7FF4XQncJXDgRZhXNdU04YES7jJl
B9zjmert8jsSLQ7nAjIEegp8bOsy9YBolH6u8e9zHVNk1mioQzeNs24AggNYtQimwoZYwHLrnlfIMsIC

pBDEAJBL0H1sMmNykwW5Z68eFJD+Vjs2bxf/8eXd7mzirfmVDuErCU+gyo2jSdpqvLFH7gKPr7yCoo4e

f1I1mp7W+NQe8HPDvU2OrmdkDYWARwFUREqRQ1kx5z
CUyqf1qq9Z6rDashQPHAQbYwYchrVZbyWa1960N9SXhYDvgqc9g39IOUWhu5EFrI7h/DusfPrWYDgWzL

Jym/XQwZyZOUG/JO8n+ADuLm8VeiJCFYq8R5H6MKWuGC+FEup203/In2AmCCGXylQO5OCuel5hCZvldE

ihkG/MtDHHyDL712I0DRd/qk9ctE+NLG+LPSVFaM+Z
/Ie+9HjoPJVeu2rTf2inxvDj3EH/gHNsOMvnhkW/VTqhpHX38/xJyUIU07O0P3gLSjV49RikAfNynL6f

GzeTrchwmvSkwMo3cU7zHvqBCJLltxJ7EVcH5dTsN52Am//VBI1m3CGtyxCq1rPHGRNaM4Svu/u/6aRm

x2cEi3llusEQQ/Izu3B44brHxPIJc0KwYmcSHG5EFc
DRC0tZh0L3IBqUCJ3z+IN+hL58ysfPACsU7YHasg1iCqfNCPwlgkBHl6DyJ3vhRS2qPTl1MNSEDt+PpJ

jsQehj10P1Op9lByHCGpSzXTwcAxbyfTPhTDJXC0663Xx1OD1P/bZxcB77xGVtLpEGnVeuGAY+1ISZnH

t6w3RNc7ujLG9pA+OCKETqRVJPlM2JpPIzZMuVyzOh
Hm2apKRuf30PA5VIQ6qQybSfZ2jkiJgy+lD4EbeMAvOLTeVpq8vVTbD77SM/EJMittR5GwX6wyw+o0ZS

ggjHI/CWjahLsDlzDZ5xgAIy/YEcxSN8vi7ZZ0QL7/c9NYeTz5iv/xwS8BmuiYd1YVc7NrNjJl1yLDaL

w32DUjrJXN/R1aE0LGnsKGhA27esn3N8Dx6tehZmoL
x7+7c47nAjLT5zFF0itsTv37OMKEpGdQNQjQEXNC48VwJQAHnKuAjjv6mH6s+RY9+KXLERy2rFVEQVJH

WV/h2LLiKS6Vy2Q1NBOcZKd/t2eJd9DBZ8BMPxn+wBiUlvb5/lL7nmWkkYDKVcsoOHRSqm6Yu+4Ag+0F

aep0g0aidYYaTCCLcQuDDAhPPsHHrrAHOdR+dTV+Bi
YK2t6QfgBS/6w3LVP52pULtJJ5ia+ohSxcitVgBshVA/1k1gPorLh9asxUejntDH9hlbF9TRFY0O1w27

paZmOUbd+KrXNkBksUxBNoADvgVGpV+dZbqLBSBYbD70shCdykiC/IQ3vobUdDjU5MXYNu6Coc+3tHZ/

NMw5zD94djPoVHqTECQXQdw6++X/cl9SBas6K7YlgN
Pd4UtW/JMZRaM+s6d6bMoDeL935jbOE1rJpGigOigQ90DrMfkq8WTZk7eh7dCDgZI3hTXjTFxBCoDRSh

GTj1XNCjZcymUa/4VWKdDVpxU9wXz3FjQIh3uW74gfwIYMvM8quqEZVgcS9kSGe7tdzYE/rfXpihzyt9

qsgE3DR6koyjjUyAj8cXyPxWnxoiXoJ5gqUwD8m5Ko
7i6u8hPZkefvqmQEVTVUzjKMBLir1uHMXJtpSOQUI46JS1IJdMawgCCSPSOIxGloDJHSFtKTJydI/PV3

TauaqN9O+dU1+3f27J08fu6u6sn75pp8y1Cq694t72UboMAkSh79n2bJa3rD7qmqX+Yaq1OaBECXfQCV

YR8cvqK7dfPALsuJ4zPsMALdy8sjLtGcRS1aPj+pxD
um2+0LCpKGfFKTjKW6sAP60JfpMM9jTMhNNS2k99Pb7PJLqF7HmVDnG3r9byk5o5sgyl1J2U7hwXxuiP

HaOYSdZPCgZmspTOpBqiC+oa5zAdIH6mCN+PfLQR0cg1eUwG8ET99q+D7A5aGOAKKUT49xSpel6orAVn

AdgLXVchd2tEmORJZGuw066G3EOoIlSpoN4UnSyjcH
v/La3+5iGuHM6sU5E7l1CJU8dRpuWP1PilzdBM8DcRgAkzGvA69v9/ngwSRXPHVGIllHq+Ikgb6z9Hbu

zCrP3q4Zrs7FKDby/gN3REDMibfxaoCdiAwcUzol40oQUGfLcszc8KWMqPCUQggNUaLkQhvLxupFoCtm

Bf34c0M9s+VCArxTXuMxEJLPt3V7dhS7lsYqhJ5vn8
+8IgaXRFbhXYgpxizzcTXtBQJwivdxUoMSZjylAmza/Eu41cv0+rOR/MyOuofd6OLCUhZWP4jjM9poud

6fDwVmSh47Juwbo/YL2Qco+nANJtZtYIVQW8K2ERv8SLgpc4Fqub+vRSMZ2tmsS7MAhqHwWKeurIVA1S

rNy/hHRlUwOJMJJGKYToJihnNQlMBwFzUzrFY6xRDl
1uZI9bas+4z1fieralY/2DwaiV0jzrYCjse77zyKDT8clU5wosluQVEMl+jEIEW3BpvPdTstDqBko31T

7cqKmq1RjgUatMDR7lTVEfTZ9TDvd7YXTIBfLQHStuzqFdZaM6Rimx+2UZXhHjsDev1qfYr87xpV0xqa

QnLZ7W1FZDWkSbwyH0GSWi8vBibCe/8XRFuxLiF2xK
HqKzAxRcXgzavdrlQI23jfXM7FHirluPF0MyXzkGXtd7PxMhB9sSHnkA/zo6zypIGTN7VgiG7gIhhaCe

jr6aTo2wpy4r8ThaHdQHO0Kh3nkig2jmfw3OApg+euZPc1GaCWcUj9BxGLjRLqfdjtgAAAxb+itEgKfz

f1Iq2KqWpEA3dnzhGZPLeozwLblUZRDOgfEp33qkf2
nkyIZxvjqjb2suAh8AD/2yXhiCEKdkyRRBvNaLWE4vYE3RTNi9mrI8tbIvVlrGs3JBfB1BDT5FOqsFd7

SjsSSnALRpeDAGe3OAbTH6r8Uxb5657w29YnYOpGnDB41UzgnhlhwlvhjSwmwlxfdO264lh01SioIWUu

E7w82RxPkanG1xmuYmbVY/2BjX2EKfpEctBx858Gqc
9Vbg8SqD5lLlPaXxhIAJC2ms+NO0/QkKFJoS7GsI5CmMsrfVrtR+Vvny+L/cM9c5FAAFja71E58tDi2x

N45jy0PztGhciGVYp4NSZRh8FW17lBJlez6+ns2dTxGA0d4mc1WYDq0WzHahiaE7yuPanj1XO7hD5VcK

fdAJn3kodsqr6JCR95a/EEjdMeTeeEdEmHGOUJdrhB
Nye8OuwVQMY23oUhPE2buA6pdcG3FNA6rZAeEIwkWty3nUzkSOJdpGqlfQyAKIluaZIvXGGx8ONmzVDM

Z/ufIVy7n5AdbBCCuKBJL4AGUSjVtLp2Nhkss15IN6IafOsAUZG7GM4wp6Jm9ekjCGehshQqmgNF1XEy

My0KX0dk+c83mEZfgfaAnRfCn3zAgSgd37OrH8Vf0U
F7vSL9AEegF4C96KDFLe94pyQYu5AKQlchnhVvERsOs4KNuRCTQ8yA5n+Sd3xX361iqn4RfrUk1ejODO

YUxoxBOsT8Gc/G6u5olaG3u3JxgYWLL84+JgsATCMveHlVzv038UAWo2sIgyeUy6OSOhlXBEBjc0uD/C

Us928leiWTYN6X1D8FriXjcr6w+xGTAfkuR3kgDCLh
Pw9Bu+Yp3U7wnGrExcTxqoEys9wDNAxE+Gq+LAV55oIaAHjnvgvza/aEWqhPBom2lnej3siABCMiHPNf

tqhjwFSVLY3/GvAB9bzc/vz8Ecmy0dV87eLGxTnB2nuBXXKD+gpXVR+FCI2scebQYxe0mlI356xMTuS9

6YMZdJ9uPvG2zo/VLDqLt9Qou5GAphhiiZWIGDTKuH
U2Ofu/ZmFGh6XGe1owpXg4RkLk+BkHj2IZ+OdTNEl/eOv5tLid8zmZKBrNJcYLuNDXBD3R5pYT0I9HlH

lw5rgy5CmN7OeBObEh/W0edTYRe/Kzlf8lWQzsFy1yBpAE6Ef46ARlVDYUNg3OqE1Svm6crEfs0ryg8k

U/HlqYP2uQsFdSIgYVdQ7dJuaVx9b122Ahwd6sLKo8
4n5kxhS3sznOqSX3LlsLgHiQTOzMUrOW9Lc6UKZCVWVqqIp65ZQmmC6XQm7HgI7KNiUaHOOYgx8qgqXm

7XnQKhW0AmbpwJcOlaeVy8bpFbapGEKuuIzfZWchPYM8/h479aPddp455AJ1yaPw78ppwYTH2BVrJPO3

JIGZwDqeyNHcJc12bvbSIo/PKG86lVL1maJ8q90WYI
x3zN9yAzYEhomhMzrq7TVXE3P+eqW7RcdZ/RYzv2aqyNjEANYvf18JmEOX1rW7qZp01+PntttoMEbzYf

ny/WJ2+YWvGkL+RXgCdlhYoIJRSpyyLr742FJyAzVD1oBWLSyxjMw96RQ54MPfDPDa3RDDHEuqMOvtcm

mB4FUc+Yasb5dCw3GZtc5j28e2TkH2n1l2znFBnSzj
pSoJJb8yb5I34pw3wADJt59jEpg9CTik7r5uXWe9T+2CeQv+PoCpe23YnFSTc56Ux0fgWw59pRzZvuFP

KSac+vtCYHm45HCYks8+9Bav8ubsp2+V3OncELT7tpxHguMtsPzjuPiAIgPkvYF3Y5aEhAWQeA5bdIC5

zjS82YKEEEh7Uid0VhH8o10SPqQ1wgpC7EW48guBTP
qw0HiLz3XzeEP0jg14VVkSf+0f8ptE9mwy/tqECmfjnNck+6WQ32mn0hWzZj3YnnssshjV1O6hmtXHzR

ShYIBpuGVwz4e89YM1k8Bl4Jcsc2YWByZV42SjvaX5zUG4asr20S2Bfz+I91nikpXKrEWgG09OFRtIy6

v2wx5ZsX7NfJ3Gg0ptddw5vwXXDnlaqe//sFpP5ILz
CrAorF28IrC3zViL8AJ8YDtchQnTehwv9VFwDcTUJd3itcWuli/DsXYzN7lE4SDdDMsk5jeko6jtQgVH

p3IeyGmXA9VEVTH19bMgR1xXcO7raxwnCXWSOYUMVvpdGvt9CSmUFpPmy/6jXrLaT0z2xFnozMi8jzmO

H2CXa1+abxWJpK55g97bMhPEq+sD0liyxEyP/k+EJf
y7myzIWQshOrmG6H1t69QdOhGYf3jYejz9K0ApkqJ8O3RznaOlGcdmEp7i+vWgraz918nVbwvmdvTFqI

HOfZN+1w7fk1hBy9+dtSUJeaOGceq9vShuom8fA5TdWrr/opsgdMX6gtfZUdQsxW44XQ7QKwns6vNzIg

7W/C7ZWg9IJ2DYX0C2qmLqClKWOEnc7fLDVCv4Fwjt
l/cqdhsjFfjCOoV6/60K9JhT9zc9hMil35gtBAk0p/kjjiY2K6XXyJOKREC33Z6S4U4qFNqRXCFzh10C

/7qkcO7O4Hp4w/c2Zo707XR62WY1BFTSJSH6Oo04szkuEBjVedZLGtyS+sjixc3UifjXO9TzTxsXnYhN

sqhwS4FljkL1oClLJ6EcgmumC63hYVvXBZqcQCfD5z
+reeVyYQIQuKyAxXK0Q5TcCceA2akUSsWaza6fMsWR86bATPUj5njUXbScCiowgVx26Ohph9lWeq3S+U

o4+RPAbo/L5lhr+xQ0OHJzKY3RQIWiXe1bXKBWBBmioQJEvntq30iiu0jiSc2p8e70hQ9CC6Zt+VZ5oV

g4jP6C+FbYcKTG59MpfH9ogx65MC7dFevPnkFZc8Eb
A9Uj7x0CHUuzTDeROLY95hdAnI0h/svWTpe8ab8n7TljQgvvZlDp55cNgjlieXsNeJtdpNWTr+Y7MUWz

OcTL5zcsPq68ag+Nc97xeqBrbXNEhqGTmQfKZE6rBgVvSF5qpbh9+IiYhZCXa8ik5wKLQe4iDUrqyNbJ

nXobKJJfSKS128FFkrwPzjLCxJzeLiKGSFOcUrL2kY
ES1/TwxGn0bqG/aWnACp62keZms1xT6DTfKYAcaLITOkPKtOurr4ZO5Fdm+n5UZxNjlBBXd2vpc7v7I/

2QpV4ycsdbZLntErnwc/q1R3yCJLMQ6M/0eihLT9ab1/3xAOf7UVi2Hc/9mntatbBB85EQvctnCpteSC

1nd1vNT5DdT+UnZYEGapjPCjXCjXQt/2/5bnAG+Alek
4UDrAJIVgP7zTCq9UJA9PQBnad6+wPoU/o73h/kzy73aD5GOdq40pHppO8vN1FT31PBSmLXD+nVv3uC+

MFE07r6+XzNmzqNY2hRCpTQ7oQCRJzl3SGMtCj6OelGc0ZmNH8S/cgFa2ql1Gnd4l/iNg6nSZq9LZ/T8

TDJoSq5G+/H5Y4U4bYgeBZJKY0Q8vPJt4kjrMgR2VY
REOV0xTHuArHT4cxwCPQK3Vsz00fubKo+ZhEQsHvi1c9tIjVOjXCWNuaZ2LJb4BMMmkVQ1DGSovSAt5K

Zs7LPriAhcXab4R8jSLDgtGoTUUYkEaslL7NjXtdh2B5zmsaQ5Rnh6Te6bzNKY5cOE7i31wkD+kG15r0

PlIfvgIoebSXtcra2vTsT5Dj7EdRaeFfMdeinTBsvr
JoQdBayz3oqpJknZ1vzjw2IfqQYhTJ8CR/KL6x4mUy4+UBahpt3bzxgTQ/Y/ohQINiN4tUoX/1vX+tFa

qg70D3ZNs4Fuf/tcJPVADsY4ETvxIPMQOG//pgEvGekTdPLqpanI9ndCCloDmF/LtF5qAUTdyGUCcFvp

l2pdAo8xLWKaPBTzdazLvS39X22wwpbku+L2Pk0o7N
mkTqElSIHsud444UP+eTmL8/YK1J42Q4C8y9qWXmYY37RwNcwE6QHhQo1euPtVOC9FLghCjVtdE/1MWp

N/VbOtk1Z11FdTg0Y0x3KD3wyQy97eU8avydnkU/jX3o387eAq3SVvMBFpXU7zpcv8SMBM8+vMEYHV2n

vnSXbdsrusnbg988bmufHd+lcdpvyFy5FaS+9t/P5o
kAO2aEnE+JfeuYnWZctWqppBvI0HojTekjScsgPOuplQO+6jzDIFOrogsc3W6wsrqaw1sVQDOtrZTouf

lgPTPJziWmknZg1pRTArP56IPMn1XqrHgPGS8ylOtb542CWfwZuO5WSgDu0Pu7jHLgI4rTH0X6rS6Bwr

fhYzrZtCxGeGyaX3ueovt6ZQG6Xy/jXrahcUg/clot
T+2YCPTkFX58BnXDtFW5qWw3JSvuvg1uop5dVHiN/bZG/pURGeD2SobNcXMEV86mmjp2HitbDlZhEdCL

jH/5OOtIBxVMJFL6cks9r2sZ6/IOCJ0tvqRWHzddnCm6koBWzyI/9IzCXlUAdeVU2VLRIe/uR9QY4QDp

XZUoxOcWoKFpb/GGmjwWdio0tHuGK7/opvu3oL4EJU
fneCq3iI4rNkxOHffv/jRCCYW7xFdRjR4SmcPL9naTAZRL2YDL2y4qKn988YHVvHYKoFkn4P6uQ6fs/D

EMBIod/MkqHxffFrs9MmDytiKXqS2aRawPwEP3kNwrarY1K2uGBlPUjG94aBAl81bbpgdbZT2TxkuWTz

fJoElUiqkxpudumKf4ip2iJr7xRrp1zXWOzB0nQmtx
ythOZ82yb5ubgI+IeE57DGC9+EBzcePmW7XYnuf6dWvMYyvSu5INzYEoeK8j75zhZOS4z98ly4NhG2jU

+01/i3Vr7O/idY7BHooy9u4iLzvlhIeqetG4Opr7v9l1ERTy7t3IvHhN70ePzh8618mP/UGLYZm2tO0Y

LHUu3sGCRDIfcF1E20inOf09RKZfgUQ6gZa4O5VI2e
hxBsaxOxQXgONuiXcR1syFs0RteCutwHft9mqqyy61dP5klCsrE1UmGHxUejGdGVmyZs6xsy1lX1kqyW

kX/iumvHtZ8HSPmX4EjrB2JTeRKcQWOQqlhFSpMVZ0f9gepmNETAcFI/tn7iuZqZPg76yYpmhczf/z9w

g98ukMg6dyHzIewSU8OI3tP2449QHLCsFSt5XaKGrt
lAnv/fkgJA5iWuwqYR1oO5v7eFjSMUyFTFvaPkrdfa0YMP32sK+Kb4svKbJW1E6s/EOGKwERM4Z0YAG6

OUy19/ERL/w8XQkD1GEuyK/l87xBSimVVMCm3uBwxfh9etEZRbMMVaRoF1OfzYolye0Nkmgii3PZuWZ3

k1iB5SYfm2gdvicAeyHNoNdFpPm1AXUnn12gi3c+tA
sfOMkkb54sE2FefT0BjJwaYoXUEicKU6Ht6oD759r7IrGUNOc+CVykljG2pQoQAo5O32L2rVwR2ITBU9

Ez7zH8fyHAuLKtCyU/uAHBCx16xPLATOQBLELW1/o2dIKsvR5LuMl5X1NCYmq74MQRFk4WimcZxBmr7R

mz1m1mGqMHujpfjYUopsRCROjR9HJpp2XRi5A5tMgO
MGHHvENWpH4lguxYW93RHMO6iJpHjDoLKr27Bo5XWZrWzj/Z7GNsu0JGvd7XWYGgVLFcxc5p1UoFYLmu

4fXTw7kDd9GBOSRQ9sKtYfGgcain4oUrlUbaS00LfQHusKfjxNF0c6OSxOtup5aycnZWOHckvDSFglaV

RaUsaFktT38j6YdjhJt3ectaNPuOPdkO6vafQTcKlx
QOj5K3SV7DrQq40hb1PqkLBUcXQsAloD28iwG7uQH7NcU+DQlFlEAwLJKyu2N2hmvpuVdCHZ9HdbSoDy

6a77G4K5Y3vbzk/yzjNf+WLJTzd+PoAUeZRJOtEsa6J8RHwTZA19crsK5wTWkiaD7Sg6KYsxRVhnG2is

N4YUwrHtmfka031SYjoJaZ2JSE5uxvfOveSxDJomDR
MGSPyPTuBWNL4BhxhUoOUlGf45LSBHbq4T0SFsmPBR3iW4Y0ybq3UVdA1zUA2hbGVRX6yoslekm9UwCk

VWsds+it3iMLhCfoppoy/W059JajxkQ0jdL04P1sFQ+DC/sBLAXoP9y7cvqWqNdyRtRdJP98wdDjb4mB

n+lVLWr1ipqaPS4XM1v6ReXW3qPhDyot7qvA/Ch9K/
ACgQEHgerIXRKPlD1hEmeg/oCa8F8pIdOl7EfuDn1v3/VVaQeKhdQX70QUBa8RCdUvPMjwUtU9R+wg/a

/RqmzDNLHwkfbq+vE9uNtDt1qgDR/wV68nxSX49moL/CK+yQGGhDlx5sztyNKGb7FN9gCjXaVDNi4X5r

4VFD2sQOfecFigRzEjGzVjGSAoXtP1NLgAZAL1bgZM
qYIyl8me61qqIpTTybRRtmbewVoc7XO7cMXAz58RTdUtqZDKs1PkHlANEUAqu6kPX6K+ROqHhjoX4BxM

RXtierM9bjpmDM6rppoO/b27OxZk6cAaGiNmOGlzUiviUvQH03WdZgqGzm3OfoafEhBKnnVNxCyNaJ9F

D2Ud1H3iJJ7NPN9rDjbo+kIRkjHot6Sk5/DIYdOzSU
ABPoElxG+XLb0CO8jvbTu32CihzJvMxdlfgPUOsCr3YbDfCMJ+pveFCMYbF3/VQvfWJeX00Qd9AN2Pvh

BF/ToBmdDNfgdBjs7KJ0cu2kabsPA80jltKCBKA57r264j1BgihIS7W6Yn3zotUasBB0PGpjS/4nAFay

ti0SeZl9Ii3MIkxivxuoir4WouoAdN7M/8dJ/QZG5m
k0VGLuRxVfJY3JDffW6uUixURgD13xI7FztNcE80m8KaNQuuCvCWKYXJfdi150cUz+nnqcmRI8LS83r7

qeHSC0SAaswLuCJAfq3GznABZtYDUH9Pwv+DuJjdzbyePLceprvXYwZMYqJub07p2mXOZVTT01IEQWUq

4zCd2SXPnY3E35hYLPDKIvvlMH6DH9lQXAMWme8crs
9+KDCDjjPtLOvQ2f4zz3JT9yMMbK6+8xfOXuZ17LVZ41R/tTD7lEpN/FZ/ucuTM+NQ+mmIo/Tpd+lqjk

isAU+4V6yuqvDt1v6hDvdJqwWGSNI5Ac/1C5WGH7ZRSAes1HF+PerLGhJwbTq9ypVb9OSHhmOfq+zz/j

lq44QFLXCN7XRRvO7abuy2umXjowi7OgmWIwvRS1N5
VqZPaC473s+QLfS3H35wMulNNeBpfoMfNvh/13IIaU9DH88rvyJD9k7lQwQUCtM8A8nbTg2FMA358hVS

Lb+wjbZPBHcC7i/wol1NopCkNQ5kCNxPDFqFfwTkVH6Xi2R2mIJgjSc1YgkkbJSGnyGCAMwKihD2kRU7

8cm1pO9aBX6X7VMi/AhqRDXuIqDUG2iAOi1PiNmRnK
0OIUPptGsdxY/tcv7aYbp7LqIZs5ZbLn9/EzFdeYb+ZME/M8apCGRI9X+a0Yk2GO5Ujx/tMdCj6RTUU+

cdGJfTPny8J/NHzBa7+lXWjuwgpgQtGPlZrUG05w8knfWl7dwxQ3LCkkH5S+vWAOO38f9UuHstHegdWx

U/joXh1P80c3JCF3wJgqgAl9fV1ymQDqAYUinuWWGH
ajMmy/aEYFUFsSyb8vAJTVL1RBZhwEjlfv0Nh79doZpAQXZ3WakekWkQ+G8gx54ZDBrGq8cU6MtkXWP0

mjpo/YnutYESEHypLWvFIV+FhUiUQWyXejlbdalwLfznF67aCID2sjNp6yllYHJBDJ+Q1rkrH+dd/5A/

WLgWOg16W9mMgKmIJIg/rX46i6P1ptXCw5NH/zfn+9
B076XAF+G1UGXMX8PNX2zUSGtnMX2MdMtrDoluRlFe7yi2IJMpsu+BrmAj+xIqp8D4VY6Pbhz+AlxXX4

mJ0XNsGoHHzBAS9ZLMxB5zWkmouA4C92tgQX32iJCGILjnBq1aWcfrnbKCu1IYmqD64wLad1vLX+cyGk

wvrwjcln4ZvsV28w9gxxlGMM6143xIiu6BU7wHry7k
klZKwm4O4hfOQdyyepPHGrIru8jFOuSLKwEyDxiaDiDYy9rUPmP8pu7TVmoHErf/i4HbWXjtBzcHA/WW

OWyXPqTlTF+YoBrrvKii152V//c14vnJSw4i3hrqTifPBv78THBH//Z/Ra1AjkdK5Oi5oAg6IYb/rG6J

Bs8VVogPhwyONxOyEkkBkRU23EMLN3uw1meuXV94P9
uL1LjAbVPNYWK+oOw2wp84a3xn1W8XEr86L4iziRdCMApNrpFmjSac2AcwttD6C7Sc/6ETRYTiIOgGGa

ZsOW3rb7Ao8Mcp8j8mpnTePTswI444BDAmIWDw66jJAPeqVMGrtcxO1m472B7EK892+opSHP1KXMiXH8

q5MvT3RjYvWFtDOvnzG8IIuUFDV2d3+Q6Wh2qDuYne
/CI6CCwjm3+oSgrJPJlKFxYo4nQySgF0Uifmogz0Hh9cjb/NM3XSpfh5HX8KW/abXzGVSCcuw3k+tFI5

iYfIGXptZQxwDpZrm8ASVj6moredpQtRYlcc81wy5OFAbc0Aw4VoL7gD6vauwz1kT9g/AFuxc1UyHAj0

2z4+8ZkbBxNKF6bVXnyQq1BvXWZyrEAL2X5ImUGPgk
iX1IhOepOsrokNHa6RswHZ7OkAtBVNHU4n3+3IzSPnxXsQPVZamzRnlMWe9+6Ral2trOj8u0kRaZd9m1

RqnVPFq17+x2XcYU8IbnVoAIqMpWCi1uh74JGNrBOYm1zaTjSYq4aRdvm/F1E350KjdW/u74t26DGV6J

my81GpacNH2wRqash+pRJ5HQWF1WsD8OH3OJkvCPSn
F7eP4U+Nd4uS53ox/0tgY1riY4ILOJyZhLu3Yn78jjdWY50j+5Vnh9c0RqbVzW4Hk2xFlpv3sGChzoVl

fF5J1CXoooq2+EgfNe5OIiITT2APy3NYZTQFecNnSzk0waaU8r+w47DW4jM6+qHB6odHLvUfXrUo9iv/

ynX1yAjmg6VUipJs88pyVLzvENDTmftK+ZLM4Pe35A
3DwVeyME1Lvw6ZbwCN4vfNMNDSqkCd3nYfF4VFM+HDNjuPEwEwU5ijnQ5t/uQOG6kVcdeWb0MBS6uVRG

mhuaOVnsURxr0wJv1kiSs6hUBaLmCqNojebZuWbHKwA39eFI8/mn6wJoeBEeuQ5fzslYi/61c+CBtwPK

eTLubd3NnHG8IcL768EFzukivTXXT2mDmEUC0cPgNE
ZAbns7ilkbrKJiJKDfakAigY6+aetXChD65/Lvr0Auhg8krY3BMj0swmeYhA3TdV5RpcWELttyvLhEYh

iC7brQf5GolFxnf0iM9qnTZRa3+K4U1vLSXdFsCo8+GSgwAcH8IEW7N8vrGQ6AJeQN6IofrrOynHm7/A

vGag6VuW5B+zpR5GDx9YzF5yvVVSdjEOerTbrGiNcg
M+g5xcda47Lw++K6gkF5uxsojcEmq5AvPmoEDHKfB+ajg3nV7SmhXk2zAigu0SWTByFfD+aQZcb0Mcne

B+LwbkVwhSQfD5wrzbpOEO9DpP60CZvczsPKqP3iOxVmjRi6wDmaWujwUZUf8pcubNLYWgYvf61/DOHt

reD8Cp3nB3OF9po2wViuHZApTT40UGeVExpoSi0Fdh
6wiaf2VlG/i+jrPkP2d7M3Co+a8XyTe0fkOKuJL3Vtqypd9ARUnKqX7qhK6XBM7cuC+IQYtQu9iXzP35

CJI1aNL3chgQQv8QEpq1ufmCUM8pnFjQio4enXI+aCSMCRuLp7uPPnQIriRLIHpHpAS1jEACY72si6y5

SNHIKMZneBUMRMqonGmZ2OALSod6ffl9lbIj2uSNE8
6LhNCPu2MoGGFjYq/duFeJMhgEwmyDI402UWJ2+pbmdL3e1+TWYSEdUNIvkmwYuvx2f1i96od0Pefv9+

A1AWvGPxNXRpW+UcAvqiD9tVePYkoIFuSKIQ8nd67AadPqsYHcUgJlfhtFRNa63PGssDHV4bM0dnVTGi

hMy9BzMi8MVyXnzaEd2oFNW86GP4N5f4Lp+WJEISnK
eNYeU5Vl0TL821rxQWWfuLfc3JW8HfBz2gs/wGxRccCtFRgARczkMqF8Tcz3PeOb/AYUEPrJtmoQl5YV

avFReIpP8xH+4CmBu2S8RCRm8D6UdHUfBlFDec56y0gI/beCcJHmGve/jI0kb2GzvTtm8SJiYFeZUqbw

EnFPIUcPQdNg+4Es+H9118CDghcju9ha52rm01hm++
XYDNrQrSlVS+D1fCbLafSek9RqqyJ269NaYE9WX0ahBHbtANabcwaWWkmuy8aCKjLk/U9c8HTMJ9tpf9

Ud84LdTKbEGLO+6rdIwyfx0LPnPFnD97NoyRGc4V6h7eWnLpk/W+lEMUp3ZrPlRvAM85pJmpEexThkId

TRWU5zmAAc+0whxYl7MJZ1E6xiuTwo6XvDIlXlyHJW
+ihWpewLvNFcmkk/Hu/cCyeE52k6qrIz1ROwBp0HpHWntimJhzVIdAV/bfL0s0Pdbdu9R8IyvlDB/3rM

+F9/7IO5ZCFaGU5JeNSEmn8BWLs7kj7m8VXBIRouP0XKQfniS7m8SR3RF9Qf1VmZA1K/7BPznpHQEQWe

yir81qtKDAm2HxeGNXAYeA7nX/oH23hSB8es794rkh
1ttDOh6o7Sz9MjBsYvkpAyd1hht+s3QShkQ70OcVz6Cfc7XU3eGH/zO/Tbj2PQGxZ8FxgpavotqbogIC

TuswYsP8D0gSe98oJJleaZ9sG+Hl5p/244d2UdFSrtykXPzYEKLSUpexAuq8baehSbNn5YQn4wBi2rOf

34AuqKcsli5KK/OwOlbXEssnVM1WMY/s7tKiBMevs1
6tp45aD1br5lA/ExTLzR6PBohFlSoAfvZL4tjnWJjSdjfrAPfPO2jflvl0698QOa74KHpGHIyYWtXX3m

EQx4lAMNosD/Pn1QklO4BywbrFdDLyYBj1jSMv/CG1paXRaEi1XWe+8lSA5PK8Cy6p9EofG2hRN8f/fc

sOFny+C4IbZIyOtnTHthiTlidaj7UqPNj9WTQNXdlu
6yoDDvkvAa2OmVk5cyK9hVcXT0LLjGMLpb6R5AVD+PiNRxxVrdrRjY65l2qHGNgg73Or4kx3Im7MPsZH

CEfVVq8ttxP5L7nZ2KaYEpjBttsSMwIzq3aS/6eaK2dHv6PstPqTP0PW/8pd9e9nTnRdoDxTDxLduiDT

Gj6jqW7IW8jdzsne1YeOnnpefZs+Y8lAHyqR638mQo
zvxSp0881XCkvbfuXjC+i3EC2sJJ7qH6EnhPBwhUsy2ZgKPfu3hVA1gRl8ZqPCGopsFr8cYNCeiAfbmM

RWezj6/4JO0algBQXZgByZbqXFgbQOvos6mJzucw21TC24FAiSWM4ku7uX7Hd4aPX+Yqhb29C7H5yHwQ

8ZiinKI5+kzDbiq4MptGQn61zn7XcZ5RhLNTtPdPbJ
rYKPseYErbq17iBtZ7TfCp8MypHC0S9aMWqeb4Kipku4dlWrrYoHJlQhGAX2Ow+QTxOvoCkKkYn1Csvx

KjKAFy0KFJxPxkpAn9Hf1Hvs1ecbOeCpx/X+Y5vEoXrhrwJPB7DXKCUw8XjIN3BU4/lv21u33wv6C1Js

lImUPnpWtoBGeVtrQq0WRNj1sDlX/z3eRZFgk2RdoL
gvnzRE4V3jbdzvydtpcRj71LbuhPAFTSdiDtoEzmlJEma35ApeeKLctOJPz+iZ/d4ANuiNNrjHKkde/g

NEceJgeQecJ23MA76e+HvUCOuUrRtdITx9p9saYhGWNqErARv4VIoX+nMOzG2Yh7adSNPqQhtHT06MZ2

/rA2E2wXjmyTxOVcSH+7d2mojEwp+BkPUXqUokFbFB
DPp6mTMQl7ou0lZBYPZjlBZ006oiiH8nx3Y+z0ExIZgMZv9d7pRNjVnp1ZFahCwQnyDqBgfRQbf+JXHR

5BTDWl1YMXRKwJyRwSbeY42n+9Fir/Y8u3WPX+oL0ToshtL2OKOUgDOQb49fpLyKEacG2AnlzjqkrPPI

rdMWTz0Yd43KDpQhQxwZ5ZY10VX1RJgeTerOzFYn90
CaXe+Yq4jX/cTdTgCkptRUDHIYm0dXj7n2oVHcRH5244kpSwVMVPEyqH4OFUp5Q3opixO3FySf/XMLL2

nvMlcxZnSSVZfzbiwrin7sVCpra44srGKlnDAUSLHw+0hal4ghd4SMXjHSrvYUT7MKfJjxDiku0Kfl6N

0NkMLVlMB9rfmREE2fszmwOWkPyIxG5t1xhAmy8l3t
8jjHt/5182ohg0svxLLBh7J7G6n0WL7a4+sGM2Kbt6y77Sa7Bb/NhjKhKelB/uZV30W9Y5E3WZmq2ICQ

6gQv7UlzNmlbg9LTAPYB3cEHX0ALMTDY0pNXj/NJI54w9ozoFVFGGc/fSw+XOa0CIt4DLx3eusPzEV8r

UM1ZItBn2x4KfA0ElK6EIL+9mOwefjGVUGb1aoVya9
9OMQU85eWLiAIMtqKw5dwrEzMgJ6v27iggdFDCvT4+aHMDYd0KOSI+dZ5nV4QRHaiDo3wI28MGyB4rxw

JqnnL0qzt48UnWSDviMiEQZCnyRsA+Dx5gmHbXxQdm1DZgagMR43T4ZyagLNDv03DzKg7VVKxDp214Hs

FHKY41t3s5hW4lTqc2bJwUcWcOMRrl6Gu5EMekMzeJ
2RJNtIU9KNCdR6auVe+M+scscifLnhiIIdXQQte9UyRyU3Sit21pbZq+QuWc1AKFJMeDkCKgVGsk5pb9

rT+9N0ephvLV4rm+gYeC7vXGMn0hi2j9B2sUq0v2rp7ykc9A7EE4r2sGgI3oSiK5shlk4TAPI/T0WPM1

1cd0fznz1iwkIzywl/dq/s/0OhWOZQBwnzjglTEPTw
uATUC4VS2eG+ZX18lTr1keqhN5/1lVjpC8zlGZ8teTY+E4oNcoN9JayR9IIWbHTnv8RkpdHCRY9UT8b+

IpJcaXhzAFqMcn6VNaOIA55bItEIoXZFazGzxYcwXna0DLFiEQ/3XUMvtNCy9YT8gQEDouHEn+WDeO3j

ZmEXenu+c7urqXd15ME25NTAXOVlUdZ6Ce2GIT7AR/
/18QHZyO05Oh2Yt6ZYPCOFRWz0mTt2Phr+9cppnyhtVwk3/5lJDCISEHzuZhsjcdiYIr8DlBwnlllYiN

GaT1K/+BfEG37tKb1kd7QAcxCEFuOl+YoC0tPJ23vz2MFCPgozDyiGKeOlD/qP7F/aFcyo3kwGRfjy8j

SxyX8aQy5SkOETOqUzQQbBheVuq0duyUAWiwSUpeM3
ILrcMkXQEzQA8DBASuzpejY8N/FdBezJ5pQvZsmJz0AEotZKQ7hYi3oXqS001pom8iLngsLTKxfjhdwm

eqfyRdLeLSfbLwMTItGWA7YnEAioclzPjAX9AbMFoUoINGy2fCjr9j1FrGaW1WU7jcyslPGT2M9WfSV/

aNts/9Y5HvoCXruH5bRski7VmRRduBolvNnoah9QfH
b0prsOKKx1Nx19mBdNV4olO4e4K9b8Mkk3f+WWVFw8NQXLQE63vzKuDEJWmkOT8NoIOLRIErma9dIX6l

rh+0/7YfdcKQus7cDDg6+3lvYIJ3spcFb3MbkUYLirhh+kbaO6QN0pErcnQUMTWh2Fwh1eKmXoZkcXbq

1sM6jPhPxQjfyOSmnmROcNQ3BIlbTFPr1Z4Bg4NaWQ
tWIYCAbKevq1gltcDiO2e/Qj4T9jtVizypTJuiFzrAZwA/bIRJ1PWvxDdC5gEoZerKCrVM4UCUW7BpjL

RwFQQzAT0cmrGbvVLu5OCrIjhoO1064JfaBqRAPlmzjuoLEoQyeAUK9kj/x+QqvUNrBivcVTgeTi6ykN

jqbI0LD5hkHmzaZLlK3r7gTlXaSdMqEQzCm55TXeo/
el/e89M9liIUMwHpvbnfSeLjauAYFZt3VJ1MngOMvip35oF1ztgAxuQW8xg1ROZ0vFMeJH3np6BhM3ZZ

zz+ypxkfbVIjV3fORW3TmokBNBlbA9vk2IW/T3/AFew4Dp2VwVOein161N3uQgCtRMIEuTVBsHe1c/RQ

QwCHO2S2z+DTin4weXEbtFcnNfnwAiGZq5yPiRww/w
L1MG6y/hggToDRAm4Oh44coX9iQbeFA/JrQsvjhdjSjW11MxcP5JzEGK6T61jkVyPLLiFrMIZABkdzo5

Q824lhRuLdZGHxKYqy7eF4VCKH7b6/Kmddv+8Dhmx79JGMJUWB+uNwzMu5053lxe3+jyd4aZHhVPhPAV

xaPwabDqW+8CE9KgErOWaj7p5wjC2FcshMBkq4h8Me
GnXuLJ03GFnjP3ENKDWANihv3B5SiXNbnXaR7RVsws4RFj6shm9DkSLIwdC7Wd8s6ECQEf1tabIhn6Tr

uA/bWHmmzgjxaaEdYfsR9Nhr0wkvabOCfDFhCj3E3c2xTOH8aKuWH58cbvrAIXzbZuYU91IG1hJ2zkvU

MgTDL4Or30BJa3ixoM4FRx/QS14OBrFjNih/oQuqxi
LnhFO/LaY3XrifzuPlMTzHNMq0fCm2Jxd2i9LFo21pdwZiXs0Sap0SpiUb+7eNYOguwNadZSlAL+9gNE

ZATm6x9uYH7cB8HPEe06/Ph8z1NkHLxHjJNvgBJrVlSBrq95+Ny7IwCTnVr1SDT2MW1jyvNeV3/X1pHq

Fsg95BxNknbC5u1lGk0gOevOVEP1/c6pJ/5nd04tLz
ubXaZ96MHiK/9xPkQ08PwYlpXxLauWyzyJ+uk1CYF2fh1tRXpv4OOUUqudJqvFdyd87AeVbhOEOhoICc

O6XyoSVV+TJJWSCC6rcIe+yjDWqyVrcN23j7xp97r6tE7I9bzDltevIpn1Shov/tg/EVUtmFwE0lOr/y

6yxXcvFu6bFXlSC88lEtdnwGyFNCG3+al1GuhAeP/u
WLJBv+YlcSteCzsFjVZ8Bc03uHZ5t1HLsUaZoRb/0zRNu+8qLHIxUxOKWZQgTPD0YcxMbXy9eBja1ypO

NeO98MwswMxR+O2GeSSvmj04THVLqukvwgke3hZN3fk+zPcj/lOygWcEqbYhOt3Txjmd8HQe+uuS2qys

hnx1TaIipEN+1q1i5ugBv+iIyRFv+RhC4nhMdFv4kF
sWuavJpqEf09C6lDy32KBEu+Dme6xO3PDgX9eV7wazL0Lwpjo+axanCjo4NxbVSa9paPR0mVqI+yybh0

TFplblFaHe6s0fCqgx5A7RTfbwx9NTOU41PLg/c4XyAL2m5JYtRJuzOW2YtqYbLov60xALQKCAttDMmC

IAuCH7Ddr9BHkfDUkoTzOy+9p+/5YqL0INaHH7rfpv
4ueOL6gricht/c8kQqoT8juG3lzR3H69UX+kW64itaz88XgmXqlCdusIryaWZuKGvvy7skRA/kzyaqgt

PW+cZDB82KRJv3MQM3o7RgTFOUnCOGv11zxG/TO9OzqdLN/lsHsGk1ShrEO6VCLTyyhxKajsdNZueozB

9Yk67Q9277nqwvczIB54t2mmFGvyj0NCJsFbdSD/vC
Xb0OfvbnvXGSuWv1DhdV1w2C4aIU5KKKsEYgTdGzbbI1q9rZa1YgooPkSusCq1sWoLgUdFvWRYfpd66J

iD7rCZo6VnteiEj3omJXDwv688pAJxy1J6uw9j7a72i0Pbsfr8TtistLgOj9xpElfkFjDJWkml9YoRZG

bYgvf2zm+s2mtVu/FoJ9KEwh7i2/IeA4BX2wMUe/sN
A+nYq34JWbD0FLirYGE2FMs2Yc8KyYbIWr8thfICDSNmrJfp5cVyvZGCS/62U/lU2yBHMT2hnjOGWLMU

VGeRAbVh2kOT0p4Rt31k8FqrXfjw10LvH8/yGKEPzKs14bhU4ZigS6VlEPNLykgMbIUVgzLakCWD7tXk

caFpEKswEaEE1KeWc9x0D3ecHHF10f1Io8wreqTIG8
IINY8HP/3adupEsxXUTGorjRoQEJwscM5bFueIm1sD6/duKCt0/5NOC+3wmW1y1u9U2WlN4AUssM5EKq

WPC+0NBbWleiyNWEB9XrFEO/BtFGT5g3pATIAfc8b4WNKb7bkM3vj8yo0k9klye7SM43+fwMMDJap4qC

IhKSJtudsiLy2MpNXcUWRhgGMbNahI/d7ds3mtsTdf
+iQHmNDE161sW71omIeaKC7dHA6tZ30Q4wP3Kk0RwpBwL+38KpC9m9p215Etqu1uIEaS3mnWbLFK2jEd

u/SFGjT2l9GZIy6QE3KOhEweoiTVBiu7l+pb2yTdWogssKudjiFdvDQmNWcDVxbK5F1Qkz0lR8kXDrmy

dE6UYfGJ4zByZmzE4bLq09a8pytWHfanUVmhS4j7tP
xEIa6Bfifr9+quuZiOP0ThwIzPmEJKD76kcDx1lHs9bijObTBbEsZgxjjxtjwuV+2npSncI9lUEo2NaE

MPkfys7hBom4mPpc6tIO3HjXCzHSVYPMsmp3vwyFtvJG1BfgpbcN90aiCn5noiNF7/Bomz+Cl88sFXOI

DeTecgvWji5NtnE1L4W6NJmzObCmVfWoKQSMjBR9KB
4cJyxxzOy+gPI+GiSlRyPTb0YL5KELtdqhtAnKEpDw5rD7+zd3DzphDxWja4qGR3QVUdCwP+7USjeLV9

baO0Z9oszmLKcaCCZcZda0IRg79TrgAzQeAXKI4YZdn1NFJfFzHv2RS7xHepOVMHO/5YQth5S/Ngakh5

WQRBCR0oKiy6mrQqFQv5pG2XV7aV/iGb/hleYqJHYu
MUDIvecMp0O+tTs+vHHqbwtnS8f2lCY3Pvcds7XBdOSH49EOWJCfclRP+1IsprHloahWRVR6gnTjl4y0

nRX/u7btm9KnEMY7utG5lLLhogS6V5If9UHAn+MfsSowN0BYi8vffXwyGKberuDh8uAqrApQEGlv3fIW

GoPrIUV3k5P/fecuNSStw2YwAamNpe8vsYh96FnYr9
LqzqGrK1y/6/mDL7j/z/MVjKXryp9tRe6qaOH6qS4kJD+5cUTb82IW7GxDc7bpOHt9QF2Tac3eV9PlYK

3KZV3hFKUQfBjLFcazQhRs1gO41pd2ppzll0q5w6ULG1Vmfu2c0JPwptcuuUWhjRo45muwXJxAh/HRqe

MKKETij8NSYw2z3l0+qaidQ/DQLa+awD2Z+eMNOyBH
zG29JvbnRbdi9HP7Zo7qGxtT2CZv+/gm2SDJ6oCPUWWTVIeU+73PJiH+9wpHWutTSYR3cE4Ii8DRLVtt

dTjlLM6CbMHDwkfKJkfkc3L4t++nNYeRe5QD40J9Qo8zWIb3nVr998LVXVl0XXJYN8H7AxtVSdpYDHrA

lHACPvU3QgU9ZFzIk7YGK1oZi4qyLnTnKNy70JyDlD
wZAXj03fJRWAeKcIu6hfl7oZekHpejYe73sS0rU7RVVKrQsfNMEZdSDppQgv6JQEMzBG/xFYXY8/Z/DU

PpS3Rwl2iOuFr1+eTNwBFEEdiCMNrY6wvIL8LChvwT7Un93OhL74lD1B2dkdYWZ1rHKZgvrprH91Bkqj

1snTRM9BLpLw7OnrAl6HDmPpL61zH0f1mnisPR0VbI
Tl5ciupIQQ7YlrPT6qrm2nS1FwPWns68xvgwDmwB2XGpvhHyYV9gbCwL+W8kdw9SUSvUOAUporgd8fUL

ybMudeB6O5BeiNRXmi6ieZErjhveL1V1qYRWUQy+qbKq57QXNQUBkfi6iMOINreduvIIcn6//Z0/B/SB

aruZrRuCZ1IYjiubvavQjNvgKRx6MgvvxS0/ZeN2vV
fvNOhq5pY8uhuontWKR0Letm5bzB8M3dun1Os2gH7CkTvOUpsUk1T6nw69Q+i4THYvFBuLkM3RcyugRH

LUIqjOMji7fuRY9YaNFq0V0MdZ9z3gJ8/fIcCaYtsBUk+ZCsIBFhN94QjDtXfIH1W5AClqQMchrKRHOm

T1WRlI5MsA+H1MZ4akQrYoLIxerrkwHFvdv/jJ2otC
+r4cBsvoLPig7k0puO7tFc01TVyNEimbR6oh+RWCAET+kx4A7YIvUW27Knn/MO9tVW0EhW3/rSQVZadF

vmHD35HlrbeGGXYGG1nawmi96Cl0T+y8nr2l4xtnDLK/H9FDQu+VVBkQdNBq8sLtMtVU4yhKHUIc7Jb2

5WcqbOTBh7xO6sNCBxUl+Dw92QbenJwLRJjDswd6Tz
d4Xayi6QXyiyQ5N4ss1/pXWkb/b/EtEpTxuYx0h8qkG9AYIjLo4hdIX7ltDMlclEwi0Q7sdu8rPysr4R

2lZ4eq5/64Ka8yRcp/1j+vi3gDu30Lj7UyaSEW1u+z0ZxRBrs7+jAk1T62VYfwbBgdHFrWHKd13U/YlK

biEkht4D+0cvGpsJ5jtEklmDCGJJApnx7szhxiGpBW
8um57E11dyzygqcoYobieUi375vRwRZTunwkyjQjsEeZSdzlJ/OmtX+Cy7odEmHCdtQlsMtLnKJDWcC4

P9mIWySuOW6CfzaXLRTEf26iBb9HAoKHNt6wQl3bqIDzq6ZQUNc77kWLKwGo8RWqop4KpFA1t9T56aY8

IDwa/fPKgPs/sIeT5ieCD/FYJHrs3EUgIFo5pzFvX8
RqWWmhrranzAu8/wfZiU/H4lqVPQ0PLAJIMTiuUmiax5mCsBj68+bdQwNDHT5X4TD/eMjVQJLNbiDs16

tgQTAyDfioqS6u5TC45cvKqbaRVrXjUSVwba2lJMphLjNQVMFUaGaylwNxh1W/ZAmxyMeeCLmYrZd3+5

HaHhzg0H6awV/v1d9d92Fnw4Hd6ptuKo1V9GRcVUTP
2L+H4HqBS/O7t8thSiiJFEALPtnMM3n75lk832AugLR9Vetc1hC8jd823TaZPDvISo2WtKwo7nzQV0fY

zWmAlv8evVmPxJFmojOTYdNsoJknQ4w86RWtpERTfPa2SJ6N+ad6DB4fjTfDtx5dQwFRWCHVwGV9vH6M

nK4fFMm316/llr4u02NvOaZMzj5NFdktBm8qM+NTLH
zykSYuAx8At4X1hE/cqE9hF8yTSSHVnIRJHPPlszBQe5HWHYBLAUWDwy2TRJBZEA7QaKe2NUB7ki9s6k

dv6DJ75RqbXtmC80z1lLI6+dmvzDzYPhakJJFFbs4PWXSTdJEhhCbEj2wSb53DlJuYd53ktkHXfqWob3

v1HfQHlPeNLY/ekqq5aH6o7YbMoSqsi1cfx/HjtsWY
EC0O+QCDYPMOzOKudyZNxLUd7Q0X3tpzM98i2v39p5nOudScd6FtdPJK07q+WlEy75yd8vjVgN4B8VfC

AMUPH6LwpZl1FpBVgdOtUSFyiZcZPwA/mH9P7aGnGbqLLihSas1heCTgZ613dz9l8U/wf4P8D/Af4P8H

+A/wP8H+D/EeD/EiZocuk1F20uyLHnjmO2SQT5Ky6R
ebfQvPRgEqI57nM0KYEq7djbbRFQE76i2JpfPf6dcf8wUy2AHxFusn3XtdmfAVPyIXVPjgyHdSvNLmx1

PxPIowQUJINwWgUjyuojDrAEC7IbDq2lfue4PJ4RDGZgnhQV0cSU4/NhaKiueKGqr4GH/vTnZMItdi+t

xqaKyA8HOxAZbAfhO8seTVrdUy6rflb3qBT/jChbex
n2OYjWwfYQSBsumgcPzam2hjth2bU2By7/dGjLwcY0yDhQ4y3zLppcIwb1DIB7tI1HTv30E0gvJH3nMp

4wKy2HPg2qYzv3GQQ/a2y3vs32HfG191OpP8L09Mudalxc/4tFwpe1lji5adeKRz11xU2V4u62zsoxyz

va+58Ko2NhnZh/6xXUnW6nE7m/QVYznqEUeqcQIvQn
o2bMoCnsG02JSdarujWb0oow99W4wi2W/YbzvD0bRL7W9HNRHaJW+3mZjomBYXqs9UUknDHO8jOH2rJA

4O80mhcRGJCKwXWNDGXYwKBxnWhNb3y5yNRZxRe8oBKrEsbSMyaxuEbVlw+i8p6JRNl6sNhQ+6wkThW7

a++WA9M7MLyB/nMmJw6CXMXSXCcvkZUM3/TDB43T/l
zljLLn35fE6t5P43u/pE/EByrM3e6uHK6QPouPCZBb6e4aJUYIsQ2pF4Hz4fG8gTjYpFc7LBXWkN8WS0

6gZEeL0dUo+7YQVeiwDcq766OrK9unxxdWXXThkVu2H2M3uHa/YrenwQIPLTDpTMi6gF4OVNpTxgT9ex

XXTzokYk/dKPqPty/yXvkCIg9Q1hMssXcg7n1R/tFA
/Q7ee5X/XsYp/90mCtH7ADY/jvrWXLz774EbTTUrPGWyVPhA67Z2/uDZllFA3zkkEh3N3C9f6nBZtmsC

R+YRjXj5VgiQxNF/ci1Jp5VFO/iR2lZf3ImIBAbsJ4tFguYT1pPSeDLqldPjqPxTD4kTOMEJg7AUvyDv

/7XRi/ckY5UQRyJFMdBYaVtEZK/GcPJZveHFou498a
Z7p7h0kDFE1o2yyThdhHmETBlw64++3dXiJRmYI/d6CYL1y/8hy1Ieyzcgn/34zB40WJMFbJrZbl8adr

BBw69AWpsxlv3P+sL2f116MedoXjsU+/gNy5D7L8q4phgpWTk/3miGi8V5TIh5S81vsSkD7k2FHHEUxh

fcetrIy49LDmMsMF23aqG20Kl88CglE8Pu8O+9ooxP
l7FxEtCW+h2O0bkSwhU6V41SQP8EjYcYuhVOwbMpDSrSZ5XxPNVwzWp5LQDPvrxulI+heamrTvuSWq07

uoVUEEYk770jePMRQnTafomracwiLD6rWh/fpnlmtuUaBWEt8EJfNhg6sl3tcINDaodn+FQUj9+rhUBF

mv9yO/V/KQC+0b+XaUXTO8114gsN7PjaBeCBgvVDwg
tNgPWWxSVRxFnk0QoU+gQccr9F7KfivvcjSVk7xZrviaDfkKy5RdSdIJGeuqPB/9hg/emGVhnBCQMFTO

ggQAx5MSsYigb4BIF5LPG7p1dvWA79Z5C0Xgc4+urrQh1+1O9Zs1916cyvBTcOeZwf/7bAuQElpK23AA

/GOjnD2rTbLX3+p/C8FmlRqPNJvnhLtK7kIV6TbPbs
WQ0nI3B0V+i3BxRqsOS5t89/i4850BZ2CoYBdTPrJrPl3h9rBhvgbKVnxVjpzXGW8GJRGf7iEGifur93

0N1Xu6C8JM/YIbjSFyaY4g8VBxHOpQnmugKkNbYjsoWn6ArG95r9u9J10kLJbA3enIWRkQON26LBOAXd

TJJVTQUSfiiOIy2CTASjl6WB5pPYTxX3htYP0MFuWf
zAADMSIki0kauf4ty23av7jix465utfsn8M+EroCo14q1cl7MbmhL4BFpYiWR4G07Q5bxIsFFAeWDaCh

u8ZiU5+baPMS8B+8B25dQWVCrbFV4Kzl9M5ZmPNfG3ildzffypVz/GlFUbdOEo7QGDA/PntY98WzpEJi

2jSGd84W0xwP84NbTR6w4oXELJDhAvNOpWkAqPGnAf
EcncY1u4wrGy1xRC/0U8PQsgP+0X/3HPXUhuBjn6SD7GBzS50EJyNoSEQjMX7Zkts9z39RBh/[place]/KPV

YFodFf0rlGxNA6vkGdxI6/mueCTLfOx9onemvH5Ys3yFoyw7h9TorLuUFxJmkvYnOECxHZ7aFd1Q2wri

my9vFEKLegPmzwis7vmlM+D5YOtnm+X7U6Fb9wXYJX
MDuCOgrGHbAc6ow2gH+Jg+jbcFgWb81gFX/N9HDAkMMGdc2Wxg/ZV2b/dJczXZSXv+NNB7VbdtJAap4R

xCgsHiGIE4Kj51idjdwsU8g1+/ZK6gYwqBDJS8UiabVIF9wlflSvuUGcDB5vzqbi0iL+/Rp3rgbW2m0J

hHGXZP2RlbvzSXf8G3XFMBZknmhsPEh/8P4YX+PCTq
uplT9WfHeaqi3BpSegHQTVqxPqlL9EJAz3V/ta2zdnXAN6495sd/5A63j7ZFTCRSTLqULXXfNkHH75k5

e078CrjjHVeLr70CGLoka5eNUsaai9UTtx+kaEg4fmzZJC2brMALBloB9nnvIfVh4XxPx/2pLrFCEQGC

1Ptt9eWhklhC1BJZzDH/PZWLqGwbvs3gVT50yecN8B
oQxEA//fmr+0qH5QntUc/D5PmJOuswz6bVpa9cEJNuiSGDm6+6QcruNhO/XEBlK1jxcYx3wykaTw36Bf

eeHtKyJr0wze6d7qs91YUcPMX/lIeVQ6x3FvrlGf+jCBBV5lo7lHZbAX9vpds+FVbSOMsY8xnxZft819

15UYtBsLNIjxiYqOZAk9TlMUqWqoXO1Iu3jHOk3g+Y
WQuJE+sR468zEFXcJ8naSEgTi1vcZIW93Rqy7oGDvcwyuXoYGJOHRDQcJxDkRl1cArBi0Lk3YL2zC8wX

PWP5y6Esooj/tre9w4o9sk5KCM7/jBXVtWp7wqrk0kcGgQnXqHGIjUA2g5vAJ4+BErrkHjIlm0PVrusS

ZJEfb3ZYPnQaIG+bXHXfeZ+taS/d+9Kxs4PrRoERYn
5Yb1HGFUFmgHmsq5eogVDCFzfSkT7Rh/cfuk+9fYTXheMakGIVcZhRIPUc/WrSviitOg/6v8VS26+43k

uKK8F5brUnL5i/h3hePPBjZBIfFUnx8aJowOjvLD7ujxCs1R0FMDZbgUq55uG+PvjFtKyxOyLdQYCyTu

FX1slvryyj2/zcuSFdzb2YyV7s9c1jm8Wf3yxY2rv0
YL7+/y0lxz+bWuEwMIsXraqtdiayqz+iIPaAmSyR2Nb3QF3i5wTffDfBXeYUZ9YdEznaD+15pWzsSo1o

CHAWcdbyHSPu+MgBJR4ppWgezp1RjA2uu8yq9sidB11lHKhKSHb63wL7y/dXSpz7S4RI4BXorly2jOKe

d/ZrQsZ2vSNQw7QB3p/LLxYWpgFDiNxW3IJ/xj0+Dm
TzsAPunMew0GdXXo+VcweHqbXZA3peT4LDvVeiXmYRG2wtfcL0/4RPPTD+WFhg9yrh22H3MQ4V5GKkmF

bxvQlgY2l6sCte/AyyZJoc7v+M3uPsJdsKvo0gUMKXLMpT4W/FQFKdubcPQm1QclVRIj4h63lX6qtGYs

C5Ca7km/3zUuK/Bwt8YrrtfX861v/3bJ/ooqADNdDT
yPiAAB0aN+tITETHNla38NTQjy9MknBjm2OwnYsjom6X8Cff2N1K+gt0OfpBBNGTpwwXsTo0xvyxhT3E

SiQsAFKrv+BGTLjDDLDw58aQgdQRqoNgvvuwkKL5YV7GdPL++GCg1H7GonEzEhYcLiknGVUFa5QtocJT

3vSmr92oiAQdaSxoY8uBqRsmxijZ0ovyAdNkVUNo/h
w4ggI9dkZ4BXpTM3zW4yCt7riEh+b9Orc9Aek8RwST1BXKqE2MVjGq9TnBjuDUj2dz6N4Tx0iaMeXzwI

DHGnJ7t+CNXlmvSsOaoAF6SHHip23g15smiVMLIlFitqc4gWQY7+b/1CroA9xink6Pl1aVvyQNg5sOI+

LCxafhAQlTAclFqXby0tJHlP2xFZvX3wSppHl1mLnR
Z6bocSPFtIdZyLgSgieVK7EMXE1aFb4dA+tzBjSZUkvRtQdfe7tcAW+iH7T7zQ1YQdtQiwcE/PocNecC

qU0DmnLqKfa2GUd/lMwnYjbt6Al7QjojcJPh7Wr7jlvSRcFUqk8uV0FhO6miMq7sDVpaPqvbpyvGxiGV

Ig4Ka6BTGey3fr4am7HZXFTyxjYK4H7PusGWfw2+f+
5mr7IdDow85/Ba9634RfpQ8siqXuFI5NI2sJ5ERyocJZGKR8YU++C37WfrUVIaeUYvm4aEVBC+CDx0ZR

YXpQ2OFlVgkPC+IBZAO6tukld6ffXrQs3bCv17xl+HZaOlCVOjDzSBvVkAadocmh+iiXaBDNbtp90jub

5xsD76dYkUoa3fsbJ3Oo3z25teGdJrNirPM9dQ8bfb
FU4fvnEW9oYY5YyKl5yJwxHd8LlxgjB7VKjVzGXR14JSG7IZLhvxMWEB3wesIhZZMoJIt84rKzsgItjw

srjLWTTg3NtuhzbVG7G1aKG0doYMWBSy8v2x8r2zB0ko5V+Y5jNi7zVxwDAcRvMUr5s2UjRAiuG/DZva

tVuRFx0B7wc8VlRnfWuunj5er7IYIlsoK8YkO8aHsE
QEec5ixPAEzV47hxyVB5/7js6k2aWM7Sz8acknFnxI//g8xa2nU5+dgPB2v+dzOjrQhh2g2wbPqT2g/Q

ri/javtBMH4NT2D1RNrK9FkYAsH5KyVC7hlTLJebiTsNCtQOhzabFTz4tFhG86R/EAqTlmWnY49E0Mzl

Qui6fJeu5zbEMk8DekXCcnoLN4yLNwDYCVpeoMcBi4
12881+rFYyL9Miiz7qWVLz/y45yOY3d5qi1jteRw40l5C8nygExU0cMqp1AiNGxZkOCtNcc+9cGnqVHt

9ZlnE4U2PWXctmwfJnmoSfA9H1o4A3OSGd6JHSNOVOuRBq5UoePOWczXeTs3FBxjskA/JRuUcussR2ih

3m/Gvb6snc0it4hQjHBwp2eCTL/2B9HSH0w113QsBY
P2vvuYZycPpZh7LzP6D87I9yAqywIWMyxaMxmWZbgUUqBK39E1XQ1vtoFRhZXR0LeL2JC+m7JVVeiyeF

gFbDxohxt3qhALHYhh4sgrJIHo0MAbkYenxVgPpgItO+/3f1oTuo5BFxQyR7nWj16ZUGzdUa7xLWpafD

gnIP3xlZ71T5meeRCGfoLDTFPlva6cwYPXM62lypjW
CUpXmsh2DU48ApDFveh8oK3dyNd7V43pojNaIGiCmdME+jMttTTpaDaqMAdph2KZ57DhPxPnzYRC5CPT

LFzZtmlTYU8Yx5+SCP1o1WW/AwjLBdxHyidITJmmGzWfHYZa8edMw68pNZ8SSROnPI0uhm6lnKIPJBvZ

umoeSNBDQoSURr8IVYufrQ57m3yYDU80Xi43Rkg+M0
1DfCCuTjo3mJbaRdMpu+/oCFeOkuAqST+MjX17KnqXyEcKNp0q2wl5zsAodxHsf/yG641scbolRZTn8c

AV/BLYjooHL3v2w1u6hsx0z9W1q1ZcxNdiUHjvvWQlswvqie4NN8DimQ0Zau4B/395CRNU3AsiivbtcN

Cz8AYjvuhXtuBrddoJxrX3vKjbq1gye34pvij7ayU2
oiVq7EfA0XwxzsxUjvUTyb8QWhwXl76n5ywtAUSVx+HAWqlg0KaWXxOk2ExDUGQ9trinJBHcD1jPYCI+

/BEI94/uZffbXxtMc7ij4HTYn+z1/tGZoyPktGO+GS5y17F3Qnmo6OmPXTkXCuaJ0GvYFeJ/WuQjB42v

PVszOdUJH1UlQVpd8D2qx38bcfPDLbhZ6MVBBVUYC6
bbrcAzWcE2vNcpMYz9Sh0sIK0U2prRg4rnWgMnnDkmP9wquVJMwtgdncHNT35P6arbeuKZGanqxSeBha

vrWM06mq2eY9AHACJmX5EEtWJEe/19je3BuJmso54LFxDTtaggvMHKWKiKu2doiqqtDZRqyRLWvXOhZL

WeIRJ+nE0hyCnNQu3Wp1LH5LNxSjH8mO5exltJa0XK
N75WDKXTSLoXqkbjPijXyMZjd1Ci2jhg/OPyaBz918jGVn+BoMr39QIwmzspFRSPVRDkKkWLgSeipwkB

Sk1tvTAJkWxnNZvTFJ67TUw08XGmB1FVe0uqpAqFp8sFdnSkAsMNk5PoVJ8IcK29czr5LI/o9U/XNP5s

RWeMYNxMogxWMefrHxyjssaOwc9txUnG+KacYvGZfN
5OBQByumKAElOftLsjUJmZ68r3Js0l8NC1B7zVqk+8y0ZWrejJQX/4TXUG+pCLc775XvSzgVmZcBCIBq

t7w+ncwdtgzIyg4ta6w/T+Mp3Olq5oNLvkXOBaFZ6vz3lqbXWB1IffbxkmRVsgnZl6fEAtZeRBeTYpkF

QtKqqxKXrWG4cOFAeac7ILn4TYAgWJZM8GOyIyOj2e
8/7RJ37Fr0B3g8SsMOUygl0uiIQtAQwtjfEu+SQkvxGUi0d0/kpqzfMppkP8euiHVZnqgPAYu1el0I1O

1wWa0fKaHubdePkYas4SzQpG7uGMeG8kYYqQ7R9X9fVsK1P+5eOGc1vHOKpTZOsmdLZJIw6jgsRq7kAU

H3uHDH5n7l1u6V+JcCQ3Yd+MUGu4ejB/uvvQNjyj7t
Azu9vIa6+T/90OCpZulm0BE7Ln3zSaOqVFLofR/baHxO6e5yrQGQVCZbVekJwRMBZw7spt8A8RedZjMr

1BecqPcjqlqd3jfyig3rrOjlTxdLtGOdXZKbGWQSHucPaG9wIaspIJoCqhbsp/klNdz6NDuX6AvD+QZw

d7AcRTnOKLMPwj1dtSf/NPqY/2hD1FZK4PlTNxwRbK
MG8ddx5m5v8Of/D1u083fbPq1tjIfyBsKqicn4gs7R2O/8Pfc/RkraA0Q9ktTjZJegOnA/28zboR9yuW

bZE4AUKH9c5QAA5Z5okZwqBxie0kzZr33YiDGLIVxxJ29yZP+cA56ZAQNkvNzYsAEim0ITcSQGk/PdNv

nUV/q6qME8jrUCIycee5p5xLCnsM4vC/GzADWVsmWb
zZhB4ryDnz7pYJSh95mSGCdpNjsGrfw+MyCWd/wr2il/HIJ7jnq7Qs3R9U9VLBfE8pc1Z2eFUvVcr9F3

HE3MxbBgA518e9+VjZjdOmyOlscYghR0Crq3huqgRaSfEz1X7ZxywMJo6n9DoNZdJdgqalyJaSdJ9x8q

sdspd58JAoxT+mxzbTLAIZIlfJVeMCq4CnxxwGxoSF
scysClX7j7IlXOo5zT5iolrUkSyUjWgxK+C/eVwV0qNCAlzeh1GO0bKHjEKzb9D/kR9Qk8C3q9/JJ8uH

LH9lVt/DsTWvMmYPL2biqt2lP0bD3gHgLCjFE+beIOOBqUaHA5kSTu49feKTrIC8TW4H5tJKjCYv/wqM

Xdyjd99vMUhEaR5urK9wnqry4d9bYKacUIjoQZCL3G
9egKNhaa/s0t1AXnzPTKg/eRtMuFosxd/4VDKEfqpBd9dVdOFLjudwb5u4Qzvxx5QRORf1+Zq3y3Od4D

pTM+WJzS0kEmBbvofljcjulbE63tSHLXckx2JYaJkjXltZBG9Varpw3fqkty9ApbrcbQfIpLqUEW3flM

3gJXrP8nYV+oOpAnusWzsGDqLb8KUvWx2RHHOT7Kxo
+8DJv6oE9ZXPnnOUpiXbhs5tyMbj3l4CebCDiWWJ8opGTQmPvj8OFphX8OMVmygkRVMbibtuq5Ayb0tN

NknEfDyq0tRsf12Yozl4IMw++/wT73zNmbHMc1A08NM1jXvrCmt/UW445pFr8BNv23NwWnDyBKGXnU1F

TRoBjFh01x7VUDBajESb6wFEzCWB/MJUdcitj7/h0I
BJCaILO+pffS8U7L6DdcI3stT856AKeKw5wtQxHKIca9+Z8X9lvmJHzGPuv7BTq2FBjuTMwSfRqQinzV

eKoCMFSP5BJaV4uFvZd8ZRWG0lMVgFA9m9u6oa3yhqkNQ7j3c0HubYfC88OsoSf/xLJIXhk5vIB6VGs4

CvI+doUQF9ASkcnfhbl0MU9GZfbXU7QDj62HHX+k8C
P9HTXtgzR1iRSkiIhWFkI89TahAg3MRHER11cZqlJ55WMlgHVEXMS19B+D5kQIzK8hl3NKxNi21DS4X5

EHAlf0PNRfOj4rsDjlV6hwiOEAaO3oNzc501Ylk+oo9WkhHk81bxbKall1k+9lTRMYnuSzBCRPH2jMG1

/KMXJoiH9+6HZyeAXMQhjuYMk0snrkjS+Tq4MunBno
P81SZzKWfR1Zjaqn8Jc8Rc+lTAlddZjCa+C01addIwTtUKn0wUecOItCLMRP7F3SCmXF1j68tfklNJEn

idwX3jcQgUg6fEz9ZgbVoOx1uyfbfu/HPWCAUTkboUWKUW47wMGJl79Pz/uhQ4EjXKA9qpwqy+tC75Jt

H/f2ZSAmBRpenET80pmbK2BDro1pk47rFVrWgwVbpy
+SZplBfa3vPWZAOeWh/CYKMsimriqA0Av5Vq8f8qFgcwkwjYN317WYWehxHhRTA++6zCTbUhjCgt1e8F

RusIXFOHLIxAlBv3FDg0xJWqx+Svz8eqLHWoeTSLLA6UOAH7DM/OmhL08Ue2Px8ijEKOtK6nj6Aj2AXM

pXdvozrS93DWmP9H2feRYsd5b2x0DBFs8tamO3+MV1
brv17lg45lsr8hQpVGiu08CNWY+LAUlAB8AMNKZ8OZff5HmtS0qVfcKxH+rg05lnPygkleZ+TZufUf1O

ISMTLgOn7KZZ74JtY0+gd5FR+kZA36I0hOZGTk9tsa6ZU7Tc8BLZrGkpP2oONROFnJtUPOEKI8auIAhg

eV0pFTNZtVJJ2lCH5nzARHVMTa7wv8nLHtb3Tu+cvr
EmBhzuagUeC/VLGdGYjbEAyIDRGmNtTHlRVPCKWa7Oz7dI4g4kmS7Pn8i6TTj5RDJaa2htAIrfoKWadS

DXOxFGF/AOZxlF8tonX1ON02x6Fqf3WpWq469ug8Dasw8TE9LoKe6pEgzaBtIkABoG/qfUe2W98BE0HQ

4n+XlmtOo6Mjd4gCv2EdaOuIBAV4SyZd7xGJ+pZv2M
frHI6XGU1UJ5TfZyG6NN+533/wsBQ4hF1NmfgQ8soJ5FrUYimOZhi2fd27ZACKFH+wO8j8+2LDF0f0Ad

muuSyzL9w5oV2UivL45F4V1im9yBTFJm4YE5aJPmUNI4YrhRO2RfSV2/y09csU3xA50CLX230WRfYYLU

qgYR6RbBtQ0RmAobGba0Z9nC3A/b6hJNHCzVhn654O
Ms7iWoiJ8jkybeM2o0KB9dg3cIataijztraPGpLWzWnK0/t05L5Jd5qT123hve6+83iDDnP3CWA1oiiM

re5r2EPkTP5yhv7XCaPWrRBI34JubefpEZ+NUe9sekM+kq0uPjNBhyP7Af7u0PMLve5otGB38mlGYiyZ

O/Z0IacFZYVVhfBGaDKF9FpeIM9EoOZto4NSwdXzVi
xUQcsElVOHlbdDTLAbkXh+JNAQiCEIQGhvvGFxEQWThQvyqhPuHtTm50d/q2muyrqgEIAJGF2yWEzn5w

yOO9FZ6yD3Fu6dh04XRkAwD/j7UTnQkJn2ktsXhUcTGB6CsKBDtg1GoMBYf2UN1darOMQMK9NWk/WgrG

Ip9F7F88kGZJiefjp/X346tKGp6wr6Xlw9GY6JyLDD
DzwK6cLT6CHdBAKwJ0d9dJ/mi5yRo0TfZuqtx03SH6QepGaxpKULDwDHY+gcQkcIRT7MruVRX3E/WdcX

PLrKproHyX0f58+I5+RSUQ98UH7CX7VkPIvVmtVd4h5x1Zc4BsAB+dSljMpQptOD36hn5enOLGbrCFM0

+v02PZ3ecywVLkQ3lGmS4z1hj1MlWWV4Mi/VYo1VDV
JIsDEaDMs1N4Zr4uiRE4rV0TQaHgF33E+/xvr67W/lawidEQpPp11aWKEiiQ7UqTC/TzJolu5RKM3W0o

b7ZLMQdyk9MXECs+Q5/R6TB9nMeX50SknjKmhaDryyznJHpNrwIe2MFTkd7Fgy3JZI+UxmyQPe0RlZUq

Jmp4CM+auO2hBvIxcHkx5hpstBHKQCQgQYnQLP5Usy
ELdGzm4IaVqCxxdO74GNEhTWnzFCTE5SutHn7uiRunRCZXdbBGYCGDTGlzH3T8etPPVQ98icrnMJamhe

qaVCTr7eAIc6R1fMSS3s7z1sU5i+XUJxDB6dxWt1ihwLCmIt5LZHcjn2r6u/dhGC6gVLJAe9RaX3f+Pz

w8f2qmbilh30TPvZ7aLzj2sjQI+Ir+vWP7KLuGaqx5
lAOF1BeHPU0s2rKLKQjCKEMvJE1olnk3CWIAgyM7IIarnpA8smVh+kf421YAFtANYqTGN4/j6/HDcMuv

g5ojRaLOVb3SBb/3zwDkfwAhp8arW8uTB1pXMIofrwkRTP77u2Aw13qJmqKxN7yqhsQPVLTIgqruUxYf

iRVafGrbhRQxV0W0hMibcN/vHS5jRcF0XBGxhVGMxu
ZPuA3OMmr9KpBx8I5mrT8ftyLirFSSKv/wqog6Ds3pKjvqDw9tsjlAwpKL8LjQM80MCEwh/aUo7FkH8k

VCA71P0/dT5kIjVmpTxWkoSZ2li/boA3jtiPnovwHzbjdKejJNVatPlQCCjhMUe7xJ6Nrb2Bbp7ARTyw

98ah/X8130BLAJMaxemfQk4N5pywc8qXrZ629XFoRG
gpF7Aqu6HJ7VTatMW9E5fN8m7BWPDFwa/4/7Qd4vou9dbqxKElQb1I3XZTeeorab7mLo88tb+STS7AUg

AS+eXsSq+8gq7d/6sT1d/H83k8lRbPjFWksMXqg4uaDw4MpE+RLk0dNfTT/Y/yMnxxlm4dnCqajwUmOp

U971vD5Gd8qouNs500AfIINSMX4US/vflHYHa1HKYr
lxr5IuvZaC2nGmXlf/13k2+vP9NLQMxUGFzJkOIyOOX9eVP0Ly3CNMnRNQICCcsLDSH1L8M22iP+gQ5b

Ph6xlsl3oMt1HXs2+2gO0mgaIGmlc6jdVdY8hLjQ5PmAemab/XH8i6Fy5fuQCgudxywvuNPF3yaFYYS+

vcug+430yEkNuIy04MhSM4wNHYOl6wmfbQGChW7hGb
GLH1/5sX4KjCahoqddMBHOi7MC8uz9GdOivGrdfRMdZE6Q+otW5CiIsiZJ7NNwRzWehW2VoLOBVGeGPR

p8sH0UwA0nXNTjv3Xy1RimhfTWZ2Zly0psGQVOzvfWaYZM9t+Ffsx1pDc01Y7+5f6GnH/pHG+2TRKYGq

MhMngbjmS7JKA+R4WT/L2qRfEQxNysHj8FUNnAfzMk
khT9lIoizkRwtlUBM/00Ex2VJeMSn5A4UismcGRFFCqKO3a7nNIyTFgOHlXa+asrH6MeMa4k/jYn00ao

ovjJmNDrQv9OcfLlnzByQHxq7Z11vrbAVwTMLqklYKiKUZ5etrx8UqQzraymqaqMndcAZ50Wy4dhdEmq

sTJF9m6wQ8nrvxXoEruQ4G5bwj4eiI7RrI9Cc4vesg
8SZNr51AMZica20CnThAaPCioF98q84QodLwXwmgTZADCMeWnnyRkXFZgzWBjuZJUGmygkuN1rLNIi63

uOstkP6BuIuqvePO7RPZVp7+yoJS+7P+EBJTHEH7+ENsfZrAseR6FTRx+rzgD3hMbxrTtJUXRO9shtOg

sjvPNc+1I+0Y7bS7e9hdvXZZOc5NRT+14ey5a6CUvO
Zy1WyMKqw0btHsndWl30UsqusPHe+TuMTartLDXLTjlOUYO2NaWRG661hAWK1wGmV3V7aFzNS63/k8rf

GVFwOs/m29opSEnRMWliHWjMmGGrCCZ0+6ai6cZnnr+FEiqmsuhTcVXv5NfhHfDhzMRStQ0K5RCuWzj1

h5Hxg6gQ79nv/Ei+4YBPQBRD+JK6+IRLWCguiL2QvQ
YxzO+rtAckv9gr2EUaOufimzAcniA1dXm8I1ui0OLjrlof5MjlziND+cnf2GximpgdCN2X9On9m+LOtf

7jQ10GEVcn9zInkCQc1SlQhhuIbyHK07MfhZdgwD7Biy4QE+QzsRjZx6ngL21Apc1fFiKkeRAdqSQun3

Dexo75dzMip4Pkr4L2eSTqZ1Qq/yt5B51DU+HkdKxT
FDkbWZQY/TpJJoom7IUrkN3uCToXKefaWyRumWOOsBdyxhnLzneBHqCkPckG6pNjVgPutSfXqBy4+yO9

mY/KQV8QbjlM5I1AOnODh6nF+0tNkYeZctOjFckAvZmwfzOVibzpSQIMsJ4Y8klR71vC3wFgmdSIwDpj

9/1U32nlb5eIEGMXCV/ow+23kW/dobTjThoysla2vZ
m3FzI4rsue9tDElU7DB8I22fPwzhrf8+cwvzrE6gWj6FepUqTSSonjHAFN2h+qiK7CSpVhFmQ41hN1Hg

nSTUwm92uakW8BXJ/1A/pnarF1I/Z+AaF53gN97B2IDDelo2jaqSQqu9Dq04zypFA9DRyFMoQbqmgu/Y

CeJK1tXD/aLk89ceGzNuD08NYBfg0crxMRTFD3d0xT
peyeuiU+dXvUor/+FhYFb8Gj8sKpY7oGUdv+CElEddXb6Z6ZuqFiC3JuZwPGzdbKwQbHPVpPTIUGMLJu

zM5uBdZj1Xa5AgpuKWnelhWrV4yi1+Mj+86uu+P5Dlq7cTBwCSDDSXNVR6xcjtYnlgd8GPMQHJJtxgDt

znxltlHRZ3LVbcdVD+OsSe7ksiLzQy5IUZ8ATdDCVC
0cS4IcNfWCSSMZ0sNU21mvG79Dgj2b7HJwI7eU4IMSHEve6fEydpyL3x8MVjOV91Bd2Kd+EK8zhbTybl

YnR52yjok9qESpLPeL64SIzpOCvuzyhkUyyMBc7xdhKrSHlOx6Ru39Eud6fGIQzTt2FXguGpbIJBMR6W

BmLEFJsQtXIKWwwz2XqPf4VLQvxUwnOB7oknP9blz/
GwpS1N4wE5F3RVGdBekmUZ1mv9gdzvNellkzFmS4SYlXmMCAUUWiUVMYg/UMlIeSTcU4Ro4dC8RVx577

hkShwbk3BM5WuvYIgf4sIgwN8S417ot/FA1NIUmm1b7XoEyuxX1H9o2EbaxPuSaK7sumbR70gU95ULg4

ZlDW/zyTKJ57PbPXt9uk6jX6x8rffbnfAA4MRy1e9m
IOuJA+xT61J7IwpsCJwF5IHt78TkOpdLJF18IPEdI2Ox0EFU8PPdjbHPho+bYF4/sYoCLSEWsFLHiHxh

MZsQodWSlTn1NWANdcCYQL4+hIn98Jr5vv0vMfqQ0OlB2hOBXxOUaW1D+k45C5AYT2m66uQps6pRLzTF

uOlwcEzVq377BCzTdcto0NJQUif6/lZRlkZwxVQ658
v/7jKm/rjSQpnXdggPZrr5cxQVtccnv6rLOmgolnTjcV9WkwlUdDdnJA6gbv7/HXG1Fdbi4A1cfWCFye

ryYi1phsJVUdhfJj0buvMH+bArLFEdJrZBMu+lCv8SEAgEmZetWr/nWYDsNaUHJoQ0I62gMHJaR2KxsW

gelL/vG2BXe2VsleKMxm8dhk5ia9DFL/xtU+PklSUe
IsspbPntzkTMZQxOkPSL/MEDW4OLUHKBHm2gG+sXn9ww+Tys3LwpYrgKqgT8QcrX3/s4NEI2FjX1gI3A

/Zu8sC0Maw/9sYCB18FEuCG6b/Oov0IGYcWBF5Ykvc+woD3zl/CmBghe72nTQQKEknPgnFutZWwhnGym

lJQJPU2IIl9XYdIHzF0YtcWwPr53xdx83S/UEuf6KO
4pP02no8qWNpyE29RdXIcm/tjUf02wDSWIjYlL7ELSV2NjfuM+/txGF9XM++OByZyqsLiHfBuciJlrM5

QF8bzJBneOU9YPlAHQxb8umvDdO0Z1DgTzc3F95AR6I2ihn2jmTw492s2uJHPeaVBvo5M1aoAJKXXu9q

vP3kas1YFslgvFjaeU5xhN0Fu7ldbYGGqaJB27VdtU
miMjKPaDyGEuVO3euQaWZCIYceris1btTEZ41y81nGjRLyyy45P/I4Ua7V3pPGOT971UHzmJR046pCDh

woy07NFXlu27wFVRsK9rBu5dPKMOepRS1/CcbRdG4TziBi519b3EhUu9V8ly8mvlmvO3L80JBLYaXajj

/uvnrJoC2RflGR6/KbQkSqcxfyFj7No+Yjf3BCfgtr
/HQ8NHkyuC8qDUhemr4vYBx2poeB170VyZLe0GfnvbexGQX3PwPnYrHhSIbmVx7MijRauHLr1t7Yugaw

i39Ua/kG4vwnynf478Nl+CbkLgV1CVwncsTwtm3LkzTYCX3kpdVg4c7j4DaQcqEq/IK4Ndm35dztVmB1

v7pPMPpncBsMUp7k/3waZiFh6scenWRbyegFY2F64B
2id8SOTfMHwkmPPGJW/w0Gofc6gJcj8CVb+BJxCDedRMTOcAG819sjDuP4dgMPPfYCWBSeMm1VinuXg/

rAe7E05HzUgmDDMovojokRLAtQ7FaXZZFIjzXQvmW05oNFleCKhfhkWNVWwmBVAFeTHNzybANSaUsqlp

mah4oR/O+bJScr4fWIR4PK8CNygole4A379ilokMJ6
ISWGFOpzp9J/yVqioNQ/xesQmRRqjI/d2oAgZjk/MW0YKuaoQhU0e8GHRpHgYVRqwZECqd0ygHLP49R8

PmpEuN+HK1eNpM3QQvTU+mhiG11psRshGosyDVVkR1Er1whLX0LH3guA8RGA0Qf04I2aihIQtwZ598IW

CjkeY9I28onDY+FSh3FGxMIN3PqIP4kPcROw3b4glB
TjBI01J6mLAImZl5+m4K+1Ov0Vt9ZApIS8ztjTWhoQKgZ6tqLNfNLN6qkrscq0tmaJk7gaSWPeuPpyvk

53MA+8J3eBIj72NybHKPfw6zIeI9Cfwc+DT/lU9umSbKBDu9tPXdLnwMc+Umk4V4UsHEiS13OmhZtpw3

+sFa0CIFxcHIv42td4U1AbA24FJ0i4oGIGXJGP6Gz9
V3MYwBOLuR1s9YvhHg5SRS3uC1UyJC+/uDuXehJ7ig6Zehf87Rv6CfbSievO+gsFhm3NK/bPm4S71t1d

5L+QcFKbRBY0v1c6OxTZOqmxUgvAbiXs7VaL6kqpDZtrnIZCcIYldXk7hqybmsxkMI21LeZJ4+G+IEeI

vMr0bkHnJoYyBBJJ0/byVbcYby3ymv7Vd5WHzikmLB
WehZzakSGg6oz8HaFSl7Bf2Qa+1HUbzfMta20fNwLLQnDzoP4VKyfvKVxcVic+mGfUqt7VA6/i8WCOpX

uZ4a5bkE9ubpTpqMNtQttxz23XednvkABz2jzLQkrJQRMERE/zOAxZLtCitzORSEhlpvz7fBW2C3c4JP

TbOcC5YGKovifhAdGJ6e/ZaPuCIYlIpCcysbvm9KO7
n44/jLbnRJW0IOlj15el52aYd7pWIrRepPKsv2OYtytysrju2TrnJ6j4l/hfog983Yz5y7tb/rWouCL6

PDFvMUYsJRsf8nd8Xvrwygt1vNCsjyb38dBsJ9rlLDjvm+AXantmYpT02z4uaRmMWT+yWmhpl0b3x9GY

eNoXarErb0UxO5599q8TnRcpLIkyI7MTerT1eEu0ut
obmrSlVNwttXpE4W0wVlYZOuhqlGy6fRyilFjhr5HgBU1XtIDGdR5YpDWR1BuYZt3b7CzbnoLM5UMzB6

fBi+FSI+0giXAgFDqrln5bsBbIvgPyku/dlT91ye0n6AHMiDDvPqpNPQE2IbxCLpKDclj65tdmZGy//i

d9oBGRZODiV43Ftc+Fdm5XkWSoiN8k00EGrjIT9y7i
3oHHRIN1o72JKOIR79SUw34R+kPjtZ+me/m9OtmnFJhm15Bi31Fl6A4iVgbmQRBpZcaFf7BF8g/g07FN

Jdmh6ZUK0hBdOMJ6j/bfQSGy23nAVancPG5btC4113kjsZYbJEBJnVNMwy/GOOQYertA1zbd3pthYyaz

7zURs+CTl/CPqE4tQAixtOB/t9e+4TGl4Iu0vOTYEn
pBkmBhbwZQs6M2zoyVHmllOmLy2xb0a+jhS80K+3vF9SMiXUI05SLGCmiFRX8dZrZUlpsQAGLkoe1eUo

CUQhmrmzz3ayaeSUgpexDhNSMGyDOC8fwGflc1CXDeJzl1Eo8tel/N2MrRpiGsop8qtkW6fjuGhbdTgj

wweBbPqeyDeiwaddlNMt3pMazE7Vzy+4liVD+istSY
kQh6DcYK572qyUSXopotj74BLZIy4DO+aiNSFb8mqKEdGFPBWvTPFzF86X6XtPsxjOSI1wGC9hCgZrPH

shYrM+H6uEE619qBi8CiMOvfmD86hDQgbXbfJJAszh7NJ/7/oFPx3ogzz5VqjEJ0JpX5N35LGAw/O25w

2nHOQ+9rruCoj3g2G5KPt4cC2RStvfrQXqPUSN8u1p
ylPZFo4cQCYTQ13JnmJGWKpvBnc4DygiCZD+WglZ/j+qRmj2F2MKsLBXcQx1G5XSXKJdzSTnB2rZFSov

NCx5crYGG4Dxp7qRwt91yVkrKXY2sEEb1dVUF5DBaFNoqwTXt/sYzlzqfTk/jea8WPbk1MqtTlYPRlkR

pBfC9/Wa64IbQ3aNEMRvfIzeLtU9crvzZN4YeyQTwk
aaDuumOm2l8GuaUyKqxC92NTTmpnh//tsqypkjTMO38FFHir1QGktMTkiIzprdFToXMdcDls9qmBg3NH

vA50z3cuNKJTwjppBczhpAEEnIM8dw0yBi3msw8lmGCZOtjO83zkMPUWiIvbwIEcjrQvyAuJKzrK59DG

XoGKSO7D59UzgzAr8Jk/E0dD72U1AoQgFhJwFIPREl
QUq9n0N4qzEwYB7LEnOeOklBjYJTS+t//jscwKm/37Zol9gIsLhS/uuIsMtLuND/5k2UlxqXR/fU1lgE

/dRnCrFTLyzSTYEEkmV+ft3ONS9PxM2Wt0N6sT0i8pDqzVXB5yctVItF0ur6GQ1Mz29p5yh7fli8nvrn

Rq+nIomAwJJbUX/p1dm4iskf94K78eyvJe9P3ttees
Vpxoo/wJS0KQnXyTfa9+ejpY+22Z9Qqp2S366I//25vdKPVPNU4U4UTSU247oP7TJ7Kirar1f4X1Jyx+

eQOVim/x3Xk4ZXCuaIlgcgF0uQnJ2LHh/3iSMMRm4Qej+MJZlMNzWUvRXWClZJTDH5DJWJWKYmOoLJ+D

Ob/PW34ILf75t1YqBvjz5DjZi7EkQ++EqY3RNfoNgB
iJfeAfjjzroX0a4YdtntwRfuJLaOLVNZ7QBv0gO1Xlh4+5t4p2SmCoZ0f3YIlEVI7+hni62RX7pcuMOk

NqFkQw4wjsHShGX4DC5cYltkb164PdA7t+0Pyg4aER8J2Kaa4rJ/ZsvVzcyfzW3sif2IxahwkGIuWyji

uvOJ7JZDp9eTVqOMVdvscHnYwlPwTHJlzAFGtasqSY
aW6z0Bw+lvjhMCyOCVBJa6Wrw0QMdnQ+ZM9ycUvSEcc3qag7u8iPxhNia0T8jgTu85c9sJEtgPndO+62

Jl1dv0aKrfgMaiadz/mHDcsLFTA/rjWrNnj8fCZzKRGMzjo9nUStaUHL0d1WFflvtC2csGThPvdCRihk

SrH5yHvU5r9jh3+oWJpiUcykDtQPbiKJ7NfSZ7k9js
Xh6a9hlJT3sk/koXvi0j3Xz5TTp8gZmKYCetm104vxZfpqMLpj1otSgtqPhOVlCF5kFSpqcLuV4EqzKt

efcoFuw2VI8h2q360i++O10Zjv7Hn89w+ak/wwXQmgpHJprEThtW0Fbfe/90c4y3IW2/y0pfnMJWobDI

eKqBOQ/90rQdeg/1sAE1PJzuuhH5FSVWbwig3/rTn5
uUZzjaMgqi/cMT1bsXEeqnBnAJwtq1VV500/h0jdLyYkZAyeKD1LJiC7QaU55So5tioooqFxdI71ZJof

xnRncpr1bEpYqo9nF7lHzoRtm8i7iUFXo570zhmCRcj7s92gl6awP1z/0lbn5SEfJb6J2oNwUN3/5Kj0

299icUFzTwl13KhBtaklPabxi4Rv+u32nBGjjqVCav
UoFShlex5/V9N+2U20OOYjsQcfEbmTqCuTDbrjf6eM0D1DE9kCBSxHH7aYhKNfRi6nYyTWJq8qnMpPkn

wZsrZOvwmG0eMVlPMp76VeR5W1ihWpYHWt5gT5oTQOqm4pUSTK/gnbbHKbviWmjtmDMxJTfoNUdfFZdR

KFqStA7Wf59EnPaOvZ8yp+K47a/XS+JmL7R9J7i3zU
ajiR2x4uZyD2ldfUCaYOaGpM/pBXIjLxOCCZUpsXiiyulfgs1UZ67YoqUJcMNe2Tm0CzswkXcjlKb16/

OUksaLgAvSlrnLWkzkVOS1yf0Qeeosh1MCteGC1qiiUeI7wB0GSuQVGAZRjpQhZRAXARtaIwEQUkJHwN

USwzrmeUwYB5usgZsEinGh3tt2y0aYbkVg1+6/CdnH
x4G+BFkP307sshVT5F/3fG+Ac8dbvpL1D3XOViY4k64tGOa/7cx4/zwoS9PeIsXTaGVJghyO6bfkv7y3

5TnIjBmDX3UVnPuEj8I4AUwnnorxoXSbul5e2J/wPoFxMtqsl0Cv08T7LJh41G4oswIZE8n7F27F+0FR

xB5eUxHx1TjR29EI/gTtRn9DLBMvi5/PaOTMzbzHxm
X8+trQmwahLe/ZonnSphSSKUBCV7YdoPNQEYR7gqx2nthp04CVu19ti0dRxMI65socEZqe5Pgnwi9jPI

NDQdq07xBecRlNTxj1RgNND9+WoPQJZs5YZEQDAzksr4svJwT1QD+pbEa5y6llebyAlWxfWc0SXjccMy

3rrf4Rl9PY2LtZiZFdVWTc8tsj5afYykWOld+IgcnB
D8sJ4Z49cnxcqzuBnZb8ClFKt76wNdBD9bIljwUSyW6kz8SRGXLZHJzXJMCZTSABGCLSxlbdZkCLExYx

hnwZrleVrmLdUe0+N5aGe7I6qoELQ3Nvs63xY6+Zbf7jWBCQ/hs1zEsZtTEpLJt+0TAsvisfe6MZNX8x

N3US1jzfNpUcu2oVeTf2ewT5scrV/wf4trblhAe3cX
+B2kwzCaj7xHSjYVJPc/+NjKiM+wuO/yBCXSGjJpjqhjnTKU7fdTwDLwARkhv/MSJrjyJtO8hpmIjnSC

fVmtus22uHhKQRHbPKTfU1kAJTjZYafVu0RdVCDWaPkvcDPY2uBZtSrTumwtvJdyRYHvDzUHZKL/D/yn

mTQzpQ5+jG58O+s2pj+FlpbHguzwYHBGkvzqyHY5oD
kexjJdq0/JWe2IGJVwmRVOn0k5W76p8T9wd8ub5JtZ8DrPZZzJE68elqH0yWuedC8z+bJx7aCcAQBMjr

7MgymCmY0IbbOUASGPkKAVkj4CBr0YsYtMG9FrkKRP7mvlBSrBX43NP4yPT6f8SxNJUOKFC7D1RkZHV3

MlHN1gYzk2cQK7WSspCOeCnk7UMCdfzVQI6mn+wtmQ
7r1rUiPxcLbauTGebPoYtB1W+wb6lZUskKi5/NMuKggSyQ2zOleiCmnierRM/x6zuOxsjzVi7V6RR7S2

7HkG52WoLCyMb3b8ROnkEOmQpv8tDpbEfep4Jim2CxhvFIAOt1X8VQZzQV3V92UmatCH1mBVunQBObxy

L7yCJJxc+hSfg02y1NKFPob8gnf0E1ZxbvObVdm4ki
bXhvxAAxrn6ORkafCaTxDaQmiznGXcwMl063AvMm/+mfsN1JhFQ0be5qoOu7YkSboxwbl80B+MVUzENn

Jr3bpB+D1iiQJeT1wjLVnVBzz6guLJtGwBoqyNFgywSyJ2OAZWLCKX1QNt+3wF/op1/1/GTERHrVmmf5

BcYhmm6b98sgAatKjTWZoD4lUClwkM3g4y2rrFZ7a/
JJ//ahNf6GpJq8tYnUjjQ8W2yUmZub3jqmRgkIvKRuujpN5hrwlk8ylmhlSwNtwrq/woRE4pKqs03AmA

6BWQ6crYHVvju6BeN/UUI62eSoJFhRgGl9d0C+uI6PTj3WwBt75mMAo2jm2V5L6tvv4ZwmeYE27VBZNL

wcJA4Mny/QWWtdJNvBjrol42BHSE5qydJla42lmBbp
HcYoe5gzJcDOqV3NYZrUW+S7xJsM2aMuyf8g/zI90IMYkJ6FLnRriMHMUlMYVZFUAp0gSi7Nd8J6tX4h

yndzp080V+W1hFZ2UVmSH1iJpiWleaC6ie/9Q+lmalR91UqhXy8EYbC/MfJ9XcNnL4GeDsKWCloNzVeJ

iBXXC/YHswvO6g8n+ijlIYDbZJAWR5QfU0jeQwHl7a
SaVJBXJbjghLXNVTb98M9x9yVeiImgz4sIhylpV1NdIsOQXSbadjSM5q3KupmuwrllGXYDE09lIUs1zq

wBMoJh0Ka0PPNFsh3kxPkac90IDeA5hM/TacnHhxLgiqMPXc/TlXtJ/hVLV3vAtQYT+9FHGCtmZCZDsn

Aco8nsfn+OuW/qk/rW7zJRxVb1QGGkjdVOirH/fYeE
Jf/R2HGRAzmQ9XqgbxtiVp4aAgq/USnwIIpjH8vdZmAO308ZlBKGrjLIJw3lLqzbncLXhR/rgCjVz/Um

R6/gnadfhX0oUx8fNRyIWMYS5g116J7+cOPxv+lV8nLM/103lUkV8cf1+qtD6ENLmX6TLtCJ7W4YICFB

/iNsW1KWlG2LTN5N/ru92lOqV+SCK/fAgaJaZBz/8j
+eWuqpcyH+6BcwSaYRWRvfmxRNNoho5nM60ZRNjdNlfrNJqH5U1yyvHM1NdkWQfjeJy3SoiukAzHnead

YciqO12jEqt2LU41a8C2jekcH9SgibkLPjfllP2kvR2Q9qwl3qNSQGpCVuGEZeXNOK5/hBsJwEtPiDm+

1mCfuVQ7Tngatx3JrJpwT3+4+VRmH/9jwVa73zcf1E
AlTZ19oGiK4oSSgmB4BOd0z0RmyOvz3uYMun6yKTLOrtQ4L8AC4kDa4CaegcEMyl+mJNJ5SVi+zb5ftv

LDn6M0WqSm+lslGxrPDIR4FGnFiAZrtW5XDYPu3kFMZuQdrO2GdoS45ovvGEVL2Nsk4Ojp+q7vNZ9zFQ

9ifK0oDaqTcr8Xa4YURcnTZlFj5QxoeC/CGjy2bM7T
cRJsxgEwQ8nk+sCo/x+zQZbGz/M1h0s7Q7kPQDbhr/sN7d73nKq0E04G1I09T5z4aebhr/y1yV+b/LXJ

//xN/kLXYxDG36kxRX4ojESuladUVGJkGNVyGBXURJfmktid2MXrsKZolhs2rPOxoW2n56W829fWZKYD

BoI44cipO7ap8j4Ej8W8PK7UWzuwWh14TahvLnnm2G
oK95K6wSlwYJmO+z7TXOyu0Ut0pR3yPxHb/nlzfTCnq2+cQO+7zzQCHFl2RUHpqzGSa+QzPxaAw6U1zZ

51wgEF95aungefuZo09L3TbwjlsCpXV0xf/IlJg40oAiRoBE7rsTg7HdMz0mUaE5Y6c6ivX/nMNTclwO

Mt/dPZOxh11s53y0q5ixoFLw8qcogu/ZpSacvGvbhO
1OnYAWHxnof+ILQr8pSs0Gbk0dj+2SdM1u+ZDH9djh13e8A9JZqXQTs3TfebJPFFst95mtWsXXXuyvsz

Ox8njVZgcA44AbKGpvyFYG7nPJCZneA9CX8ZDrvc3iRysHImfTHaKZJNGJYEdKNvDASVv8sv458OUnVb

U0Br2XNAG6/50ZpnoyclJz0gsj1dNHtD0+ZDLDd3nm
FI1qtEDP9gWtW7rqqoOVPQWG6k8bkPYLzOuitJkag4SikErKXj0Xdg97x448TzDlQjooIHbM7SpcXfKe

YXP+2tGRDYvH+QXld77FNEdT+qGYbzQGWykSwdG7dDh7cr/OTSm5Tqh/bi6fbKnmjNr+doIGoXCmGLE0

3/KntDCGBCq7mPoGM6Qn/cQ7fbHddZ/Q2tQ51uyiFR
iXWsIoOES526/D1rf5zOj8nJd+0l259w3Mls6zTZS64Yh+VdH4RP84GAebKo7I91KpDrq0Bxa2htL0D+

qPHtmyjnKoXsTvx2Q1oObye85gjgRmmVG/K3jABHExrar2699eCckgR+dFVL0i6n4wJwoG//Ovf6piRH

x9/UhEwuBuHvc5p8pEhRqaed3jqqCapIsdh3pOVLuH
NDOzm3yp2bK7dNLilqDWPAt4elH2R2E5ZaJh+lql/BOCAaKDh8wFPgETUD7g3ssSpxbSl2te+sWckFZk

pHgzk3SLTPw7oJeLLEoDuUMtL9/kO4jR22sGvi5T2UHOq5bN9vWPcXY41NCRWseF0qoOCP8FPWXmDQrv

UCyn1oJFpm2KAMjXLsdDYHgNqky/Pg1F/a7zd7I+2v
67EjEYa2mA5JCLPGrHtK6tlyQP8IahWkjnD49gUbE0QwyhSTpc0Q4N9WWCgj3DtQrkeREDYRdR2UTP3a

Xcc8fCKe9LMslihdncdOpoMkWyaoAMkkOjEFG1lXVwUaw7XXmymLcwCT4bt5Hhll/GcZAfZfTLh1oTYM

T2RT3uBDnWPtMRh3x0VoFTQB4GLYVTumxlXj5xPKRT
pHTT1qZTgblfM7ID8ZIumyGoUTIjb2RL8tXUdsVNjWyy7zqQhDj+4vT9VintU9zh7T/g7x/4ztaPL0E6

U/faXhy0lbs0PpMsq6jOCJ/A5hXPpx6uRWddQhEGSjcAlQ/WhmNMBsL0qP4sWWL1izaPyqOlw+ajie6O

bSz3EOzA8n68vLMcmJH6CYhk+9453HFrdjZROZiIwt
ixv89IdDEoHE/JFjz70ss5CTp8SKYNmyvn8zZSswkBSmDO69vxzohkGLnGR9wRTZEF2mjEped09IkTVk

/uHVYLkLnP2Fjef622yNrCFqGpC04Jx5kBRWvqKj05DOqDzLCfriAXb9f6SrruN9MJY16POO3rET2tn4

8JqlkG88LjWsmri9to/Chi+inlLdeVzwviBiZzCnLFO
tiTxU8kdoizc64RQuaYrK2VYegV/DDNBn9InrweVO0unGCeN2w93qLBxPRjhLCNKJlkB1k0Kkf+BFWOw

h43beNM+Dr9x6oFlVlvosyq/v47EnNQ/QK6J0MbCONDP0ktjmVReS7VoGNUqbQhoJ6b/fe/VsMOhRWaY

t3q1OCHDdp4B41ZDom3x29N7mG3NcjR/HOg8+bz2sA
kA0Lk8OAW7ZVTm9b1/itg03lwLr2wwPuS4Nty2xPnoDpqwfnBGeD/UB++wDmeyjwL+ocb/6N9+m5yt+M

vH0KaxA/FRviK8LaBH7+lX87t2WA2mh6E+34qKOodDumSH23xaQt3PIhJ9yQsKkF2BJ4Z4Cs0xpKxAcN

MfhjCsh4y1hw7b91hsubOR+viGwKVmxlj/kdB+DtVr
LeJne4e9Qu73A8/ROL9XlClgxNYeWJqA4O4pJOGmdPe2ghlIUjvFpVOQn+Yqj/1Tu4aEIiyXla5mqsYk

bL89w4//6NJb9cNlS272QOwWx2ex6drcsCXcgXklfgEpE1yCtBy5Cht1l0PXkWwONounTQfva5QpG7JD

docCvuwpBxKEnO5hQdO1F6vg5YJfMkvDHIRSyfAQsD
+6hihWTiXTOmkMQ5gzRg5ERxWFOjda6On71fkf55BA3AcUpVlDbXqkQ1fldzdgG+3crA7fXvFK+xLeO/

MynuZawU1ZNu2cwbMbUB+GEk7mJ104Rr9IS+J89ObPnprWI2/EC5ut+DMFlvOieNF8cth88pxQ2bB7Br

w06z6FEdmpA+wCDr+a4hvI6vwqm0z5DdZS1JxYPXvZ
eAa7YWDL5O+lfJcdeU23ZDWPcuOBpGsOjVn9ormCDD3wgg5QDnDjShgpQXxNZw3Efkrf9WCtWP/rZFn4

vv/5JAnBqcrlK1Ft2r9Nyg766/ElFdx/9oMX+N/WvsX2P/GvvX2L/G/v3Y/+vNwMy5JUNGpwrtlaQyii

X1IMdke5THGqqkuofW+2VCOIszAh9tBF6iFHk2s1hr
9YgZ2gC2Dvq2//EF5ARTkTlIzTPCTfwpk5YaBG9N/XTTdbjWzfGhv/uvyEfQ5SVgo9A28uhNRrSmIWqF

8rG0/dt86iUs0i2m7QI49fwQoS1gddTLj8taaV39Q8XOPUpCVCptxIcpPw7smup3+ja2+L1w/LWVgDbb

akrusxE38THa1Xig5giGvdlyKAYh1pHuBFRJImOcVl
UyQa5e1DVNj+CDcAQV9JjIohoj6VZgwe6fMz/FjecfUFXe2+DhYildOEZ+Gu6WYu1RRV0nnPX2QY0Q/4

u99wpq+sxfknGalCGygUvIEsB7ZpHIvFjeDpHFTLJJCvVTYqIVYHf5oiYV0A8bNXI7XqlKRDABQ/7vmb

zmAf5Dzfbw1w69F/tpz+59d4buvo/6rj2/IHmL1NjW
fSF5eR1g7lqiamxwdsXR7dr1ZCRePWwe6bZRphOIKrSdtV0a818XhMlRDRZLNOKhRFuguH4m9j+ykpsu

qmdDBlMCAgVD3tFzgWPzCsayPEkra5KxeBWaLshSOe+c6SDTJJBhG+6HMaBIc3zpsybZawS4Ci9CIe9O

NHjtHXsmqBqhBLPuz16XbdR2V+SqRoltY+9u3gjCQp
KCt5m9zXVp7zOYaacUpPK2riPhUnvdJct0U621gu6OJKcdqtifDL9Q19Gd79KFC72IHf3+1jko33nM9d

BxYJ7fRgyNPXP5JEswac+fJNsj1ljdxyuyxN6U4Wvj/1lYdJ6EFVGjH9Ne695cpD68b/+lvgrXFhnAWl

gOqe+7EJlRmtnZE9qClYaFN9smm3hbzzKz0rC+3Jz1
LHcu3oqGj6h913JPsbPgautS8xzf0xyeqAxvDK6EuEvn1rBi5e14TSaH8114OHfqDtlEClBa2LU+sDgm

zJgbosCd438kTCY7WzxNqyMc91/mXKxRTSmCQYfSYTWFgDykUAVPDBHqFHtyoG9qzUyfTHVWKcUUq1H4

o5FR3hOHKmBxCiTCcfCYl7/MKBL1Ozw52kiCMK5Uqn
6oCx399r5VqTVJsz/gvb8OOAznoRpLH7mcWitq3Sb6DHuFSTWL2pfkkJvsJj3eZp4Gp0cgCLIBG7oYeD

Ar57fahmFWLFnDmz/BkWAk6I0oYhFJoYZcpTv89XYm86ETuao/m1yXveOKk+mF4Gwrph7xbnxnASmoww

nh3g/7+gIvJ6UmtekyW8ZmFrpuHRRjp0toYBu6a9rq
+7fRguHL33e+tOh9fs0IefekT0Jp2N9wCcePSqnFJh5d4c7F7iXXyoDXD2Ir7ZWLK36sVcOd002+02eP

PlsMcDtKgZYxEW2uVS23BX/eNq+cEcsivhOU6cwNoQr9ffNaeHa2OEtmuvGiwxTG+psHnErBH5w/CZSg

U5GCjJP87xL9BdTD8s9jtouA+MkcI/VuKjggi72qNj
p8F53Nb5ZQGnQxtOXxXfHU1wyqt5qo72U6cZtVDiC2f6HN/xcs+JqLSvyoo01R8RDWvWdM6srYEbH9GA

zyNHLXr9kQk9UekrWQiY+L2FINBcxaVaJuJC5cADcqc57HqlQy7IeD+fFj8oFO4kTgFj0uQ4SHyJZsyb

4gDqG8SCixw0Lw5S30z3e1gN2DqTLb2UPOsU37vd1m
Du35nbbyGkXYuhsgSHe2BWkG3mhpy1oylBJ1soOI5lDABeYH1XghJzCHNDRL9TZkSWtLMhY5H0nuzCL1

tNZoLrO8swdlYsKcFHMW5+tuQbiN+6B8sLcuWGF7WIFw0W1iagenn/FB3I74IEWGo/rN7dvLZUIwllDp

RFcPbZpBgw3s6IZei1c4DSZ8D+RJu9sbBcUzrEWwQO
jdi7ZNLWZLpPvZZS51FhYzqbHa9V7PxJDUx6E6SIEA1MnwU8AF7te+qAle3n1Oro+e/bt15hbwkAeYO1

QsEtlphOYgk5BV1Cvnc7Dz178JgrZDS667HfXBv7sobXTQ3FqPvn5RoBI755Br5riazVuzGfg7n9Z+Xi

KqTE/LWZ2RDSgapd/YnAmtbJf7HBr52/GIZaMAu5Wf
lzX/2oZT26+yk5huHYgKrCBbu6mbChBxMhKW4aoJJAXbFyyX6M8mRIDWh02e+tR4kH6BnRpAW2mTC4iB

HSDLlEGcPRhscKWXTY7xU3t8mZ9cKlt6Dm6T8jYp98Tq/ggXF+4nzoU+U0P9l8gHX1gin4J/zaTJq2Zn

OY2om+NISAWJUe1lhqSR0F7gOJggtkvy4sqiwAcX+7
ERZ5mjfbaKAkZCHV8ZoJ1xU3XQ18LCWwmf9PcO1Ra+Q/9NmVPo96Rkcw7+bfpEBQiqol8wUK1kavB6gT

cSqtiFJr75jUeUAfcXt7OQIWwJnkFtOTHXNl9HQkFbs+3Msyb1AOmyTt5V8XAZdFzqJb/j+D2+lGwLrI

tC5AKD8Yrb2dhMeRf2mLGdbaD5Oo5NktMXRAWmBjbN
blSDv+BaZKi8W1axIy05UdefSChSYuiw4wuQB9pO+C5jLzQChdY1LltzZTDo/ttdP82dldaKhG68oZtO

F0/gLZqOHboP6Ad4i4qzmGCwgjtlPs/grlePa0EFgsYzunqBYVk122TAh1+agNtYACug1R+1cHgBGPYB

HKzsfbi8yh/DwP99E/jfCQjyyP052EgpFnz7qsthTX
1+ogU3glp5AwxzAYsHbW5qozkshfBWNfanOXH46dW4YIp0yeu203LjgaPzi4mkUG1tKku74GcUIdH6ET

d/6hcTOvQ//hC4joq174EAKaNPKpr3SH3AZUzGl3iUH0xJHpgHPo7T4pWLOFg5sLGF7FI/rR97BEAssi

E6omdaJDQ6sJCMoQ3czoURQ63T/rgwBimZpaOooY+h
qeo5o37qg8vYIVA7Mbg6YffXX5k2AAYiqViZ2OU3/AJHkm1xtClsIytYYK4pnKYSt1M0kld1UM6YlZjz

4+hlcGUdQP4lzYIAtvpoICdCBR11xivtow6G2q4WWyy0N8wf6gG+XBDAUXocONiNBAAuh7BOBp0sHeJd

CvBEt6iiGPCoS1K5ibtE5HEXeNmfSqmrUNEU3cu8ZQ
y7lFZDp7yHVVpsE+45nR53Ohi3xq05RLInfam6YFp+JDtG1a//i1WBwpchL25FXcnAz9CtzAqETL4rUx

FiGBGv+lJZWuae/fnuVyWfQpkYqjhgJVWqXI0Mss0yarYsFDnx4Niz9rzK5HguQFZygMAKq0TCVD3fVP

1lAd7SZVKJmZj9XMpWjnK4gYHzLkJ7vpdNK1De4aGt
DTawbVpG0o642aeFOZa9O0WWttD1ouOzgH7oTV+W1PYhoOUeCfvrHfrz0DzUSIQ85z9txHD+lw0FbcMe

stcDQprwkCsooYKR0ACszMiT8Lc/2PfKeNSA2H8cUINUoN2qb5wZy0JcO+irZbekIZLSvOir6KkJHlHE

xDuaWwQYYwoVEGn12yL0/AO554nuabIAhKLHvS6ILF
VvC7HzfFmVkj64ONyd4MzdGQVlusGS9E9gGbZiBjXiqPj9HP+lB/B9Uqw3xVAQUEdNcljpQaOAshbMG3

E1TNOsG9+swe8r7ShIlwLH8v6VOf3PeXPR1lwc0z5k8BZ4oJV+PrV2zWP1kW3tHbk3+2w8SkWJmO1E8c

voMdVRWZuZbM0jEWfDaDKJjB0OWNrdnO36WGKmm/Vb
b2pZbOx8RN+xzN+poi9d2oUdqn5KK4XpXuVbF2pj1qUOxmoG6W6tGjH/7SWvrCERlCxw8WUPa/z9w6Ls

QC7BlR/Yctkf/JkXN5FSMaO0VtV/z/lAJwUjx+JgOOMsGLgi9+dOo1xkTspqninGYLNKkCqmv472p6ki

dZXEK3BsYAX5DAw2+8cppu9YrVxX8Kv81PyR6zxCfq
45AxBvC0lG7+WjJPWAThQIToEULd2oSqzre/VgKToPpu4uATSoqOeAyJsxwH+iUIzPyY3bnmyJdJ2uaE

od689bb49nzEY9tNyjkhctJhOahQ/jqbkPHIQqVVLzBUvDN3vnqvsBA13LEgH6/sWicPABDKf4DLoW6u

MpVjr7eqs1mPQlJwvU5d7Nx+aHflDF6N5xvM25BsXn
DXoZUjZ1oM4eLEvUk3+Tyree+1KR09SWUxE8Dm7th7KAS/g2Hg/HEdmQU7u37/jrhN2FzfMbjr84u87lH1

1MhuNmDyWCICVugUEcbvKEbMkBxSHC0HtKmZmNypaiO5oBPVXOCCHudmH+hkEoGv1/Z8O/mJ0Wp+yXT+

oodRdRo5jqS1+akLXJGOLRxURxXoGYlNcoCgpn8a8S
LYPc8vzfpXuW0zocYsWRCmzAW27nIwP7aJq60iCz9XTivbfOn/QRTrCJ4gWjFM7Iw7dMVZNcbcw6SqBW

9XikKK8n43lIR0KsxBrNm+5RWLXCuOCer5dbwuhtgIcaT6r3NkMuy0PC1SU12e6ymipAxvH1eCf6PNvw

0zLPeKjJEe461jIlzZvNaF/9uqKQGvufdGTWBrWfvF
yEdnIfBluYPcRW5XCp0Twd9l+x/57GlrtNUTRaWHUqCrIbO7bak2n1bost9KrT/VmI3dUts+qr/oyHj8

CIMAAnKN7FPSe3PLF/wjLbSRMEREWHwLsPjsH/NTckT2pF5dvUMmxbEYf5otKvqwY6obAygmsM1+12N8

ZcabXjyO+8igkfMFmHiEW/ZfouvfUBmnUOjZcXET+O
pj81fERCd9SEIFiwaNOIAMW5h5jejmVBtPjzCAUYkUGCEWn4b0WGMaBdhFIhlO9Xv5C/vHFmEwkR5hMz

9ktuGcZn7+CZiZ1H4+mYi5oXMIXQx2sJbL+804tb88ZfOHxg/lZbj8G648pXCkLbgvEtQ51fGE3lRoPf

H2E+iSctU0rQHlkxkHiO77MsUHUhjLDDYx63gglCo+
mBmLni+beapOnYVfgn3CCN93SGnPd3aPWCC0F2vTQADg7lrhuZtqXZdwZy8uaAPhdUXM+6MkcLkaVMaQ

3Z7+ZJe/AM7tWddcbeSc9JDUVz7AEPdOwnkHds4J4NlygbS8B04V/80t3G8ZHWuA8DQsZmc72mQ3iQdy

nn/beNAG/HGLKd/+cqEJ9dLp2u1pICadr6z7soEaIg
s02jmctNfl3kZ/60eo++1ot5RzniA/3ivGV27ZmjAtmRJ1/fh4bryo1Z0VVvNMC0dRtpl3hfuIROHhV7

a77AqdLESJnDLJNuHfi6TogKBfQQiSJ5T+9qLyah8yg72jjFv1jAQog6AZwcP91M4bdUFHYmlbWUgmFt

i+ibJjYxqvQ0TL/hWRUO+l5+T1f9Kn8yWcY4Xz66jr
ysstnPyjAtJPiNwZZeQUHgU1OUUULhrGM/kjjWdljuwGf0YU9pKrmCJop6bfGcwRMdXuvgCHdXisV/6d

9rT82iuM8kTvMEGS7V+de8GBrhXmIhrKn2NZFQ8kihP//bIOxwY9vaWEZSpjKshrPkcMpaODxFmIgZ7F

lSTso3u4JmqWBR3VzE6Eu3bt8jmPrppLVhc8R7P0ph
ccnZWdtLkM2GJZAEyyBqc5r3AKo5i4UpJg+9UJxcKUsgsDLM4muDU1jDP5C0eS76DuPdDiMzwKbRP/nb

5k4aGL1xZdYo+gwnxNt71cB233GhoOTpEPpJaaz2EnVb8qJL77whC5r1ms0C9AN1z8edbxfiowMy14vC

v+4SxKYam7GT+WQfjCN2GCRbepNyH//toea2PO7f4F
iPYL3rrzBMOSpf7tS7klEr5TLGAKON1CWDpL4VMpIXbU151SAhiepYhzhs9cX6OBHRKCLwx1KHbL/8zJ

VxRcNqx1knyXhc9IBIZl/miwc8m62TsOmXpZJSHvjrnReveykZ+yFl6UGDApmIGu04x4bfNH2wxOWN4j

zE1yvcMYtlDTkb/PT/Gkge+XEsQULUN//uIEFqaBD9
v67vEqhKYz7iCcorbH8dFJOE4290YcM6Z+oerYwXYJ8rLcrhqfoqlridn7E/UejQq8GKxjkNl0tFqvA/

1sxvyX3Xe6sKprGByu+z+HubPjOi4o7wNGNJWO8HFBcqGwOdE5yyiXgQzVaNqLxS6uud1XSSUQPgVeXy

DjfiIAY0k7lH+jBN49Q49/MusASSo9sbet1uKWUkWm
DH+JCA9a1TERh1tS7plL5U0F1I1zFiRGvX7A9WrX/PeW4wGmSEsUXCo8LD5lkcxCsKRTe22Y1eaiecVC

sY9ee5u+47RaFDAj6D+T4k0eIe4mQlwFh/r3BCFvzdcZpUfwTUkttIQEUd6B3HdnXn9/XkPDHHT/3Y7/

EoTZOy2EGMu3mR1ZnhchtNVGFT//iOO58SWNzGhYUH
PgH3/KlSFGxH4WLzxWY+0k6GKrb6xv347Q2P+xL6mf+FhSZoMRh17Lw8/kozlgzcdrXtXr37aTd14STr

g5eqK10m1ZARhxGpJWAwhdKHgSpT3MioAKfgnyofqwkJCx/l5xgjrbpI7IfpC5rnFBAuGf8g5tIpBqZu

inoBO9qBu+k40OkHW8TTlrvi1IqaaJYxKhc/Ge/5mc
RWV4B8VfGvTAmQmklVnH1dkFQfap/m/R8f3e6NzmZziXuIZfvaKuKRpVMbgSsiORuhfQfsG9RrTSV3I8

ejfAjKRp8yizhglmKzyS+GSkhRe2h8fsqulGmiMd+ysnLmj3POy2CgEJPtHcpa+rv6/M9K2aNKl7vVdu

oZegYU3K1xE1/9XL/j9My1Wfon354NgRoYQ/j0417i
5EHDfSkTPNIPvJhjlGwqXkS/he2QgeTljMoRt6Bpexpd6+MIUZ2N+2419jB86595KmT42Vy9rsHc+C/r

An4VAtCKr6tO/R4PhdY5v1Nx/iiV+S9thd7IAyXNFdTjXHya0gsSNVQYPCv3BEZfEvAUl9jVyXUnDND2

N0XsU0kGGorSg02XqIv5VOe7/jBBblLO+IJ3Ty6q2d
yQbmw/wn/ltx4R9MsvRzOF0WmRJ7J6yAxuDmFyRphRkmUHbHa5hf7Se+Hj1pXBokozYOhPAChXSIwqny

EXKSoyfsRdmm7aCFwhCHsIXxqvCT1lIVYceXGjMkr26MFIkXO9YYuD4k/4yL3mGLwLhdvL6KGnyPaKTs

Z43gnCvzeBDb4HHcp/8BwqHCLMXQxZOmIeLN+gFJ+d
+5arp1XeNqgg6naFF4hJ+d/0rQp7kzi0NDb2V92s/Xc2RfvDAG7lDTP9p28hAZQZSbiH4u65AGSe4Ntd

Udo8i5uZOsgx/WXzT4EMCAf4s0byYdMT2VqZ4zHL4eSTT4SmA/XoQurlir1GTSwdj0Djc4J7nzXcg2pF

pNg7dlbLhQmgELexs5Fxq7mM8tcgMhWqHGbu8cxwhS
mfL+3KPwR/I18J9R0qhk8jzKmg1LGp72Pc0kKUgRYm6LsA0/2MkFmxXP/sD7a9/nahQx4DnYMqtX6ch9

0/9LDFxPTcbFUGtMNNpbcuraCDVZdRr3uj1zFUs3aQ2P4fGYZmq5uXV53m7yPi9RtMigzk1epk1nL5an

E2QBEKzciyIOwhD0/PimldsMbEz7QR0I1sJ+CsMF/p
xv56n5mu5jB+mw1oxBy86KXXEx3481P0QlGIZq9DBQ1KOcnHa4iSIUL9mZEdXIYpMAxbQlOROPWBHkf1

6uk/DPYRj+27TQqpv7rVHCoaxl2K23UTMEMok+GPQe66rgsycNFrcq/dn9Eit/auIaZnCQMQF0R4thqK

WRn6kbMmOaC4tG98E7KWXS1E0ir0CxWhNAbxP9QwcM
nD1/jIGD1txF624UqqlVY8qyaE2wUUeRciCIO5cl5VY++NDPhNazJ7VFc3KGRWUE4UynOkKqWAKAw0d/

qo+Sf74QOkwQgH1m4U/jz89L/cRifUaYIT8WQrtvsRa1YF3Yujfz1Sa1Zc/w5NWPuoikmgzU7f9/tIsg

Y2EhH7NXJvnzClM3UoJPZZ9RgTtfZzvj4voHepRs0i
KQqG1eANEpxNTFkqzJC1tJpynk1ooxtkCqVCQaiS497WZEaOhHQbCJRuRZzcbADsYvO1LW6zp4cSfGSm

xuBjLX/001fVGYcvKIbgjrsJcHTAttAjwuCySkbfwuOyVm4DL4p0G+PCIsi79a5gD2RN0+BWETvGdJPQ

E7lYPN89OX59tN7qNzPmiyzWofqUfwYQsprhxh/Amh
5N+n6+tJwe4hpluWK7R0RNNatvVyS4tdGRIJWopJiohA1PK0vVjy/XDIPLYcRN2FATfD5iXvoYWa1rRW

EA+MgtdSGAiRg691CW1p0/t0Sp3YXQjICtNICm4xHVDCH8eiIJysOoa4J0oi6m6ZZsM2+zJ/lvyPldbR

L8w7Vf9iK5hMKVTLe8XF+0IaIhb0CUV4uKjzeu64/o
EAOnEYs1glgZa8tLUmPsV3PsyWu8pqrpT+/XmEZebxwmzC6+0k9lJUr9Q3EWhaaJIXhVwRazg7D0JqC4

+cI9xxHsAheWREOvBa+uluIaC4x9P10aFuOwzr47m1JU8mkdsUTYSJgKa0HSvO1lG7sY9tLbfOQmFlWU

Nbj0d2KE5mz/aSsspQk5RJsadHYNE2r9EJWXrFJx/M
j0NqaTFviFoTZs+h6m2FHypjQh4uTAq+YGx8sLvFWb4HNsPm4KBvDqbYCliJWhyvjnXlK0uZSPIJpvM6

yM/P6U8dUlxeq7CUPwHqmI4usjQwBPSdYKUdqV8rhNwYC96tUvyor5TxtkpjwIP+wHrdDl5O1Y+4W/CZ

dfOMkMQtgHpOCxwAAtEmOyWFEj/1+Em910aoG1eAxG
ccpJ9v3F2aG3Z05H2N6SC+McYSCf09i1KWEj3+CKzYo0Q+mnEVx5c9NhcDQegb0MHkGbnlRlxDQJ39xE

76L8+xFfRBe6Mv3H8gOa11yViEtDJ9HR9TfcPA31ISBCz5GcxxPEd6hsgmw4djWF6VIZBRlKcpyVrr24

ZzzAZWyi1N9WMvzMOZz7xpAP/rZGmfBHN5FMeYRhjX
q1llDN/7wVdwtQbKlSm3kAIFtjuORdQDOYg6sfkAZWVh+7A2HLbhwOH3MemmaohmAfgHmG4kSmDIt4WA

1wHpMbGiLqLiP/oWmcMO+M2hXrHiI4IlbHF49zta133UdZ7QOpV8HY6aoK0LLQy3J6ILQG/zY0GJJP16

6MNxdtoJtr6klyAAahz5cbLLDrRYpzbo1HceipgsY5
8i7sqThYGsC25BCh2S9BDItyIeXrwqMf9IH7BGmlqvKuROaC3TNXT8haa+AlAjUGgtpHioYu8C1rSXIU

TRS97e9uA1nSH31cG/yaBFLyilA1WAmLLS2TUCbcMdWg+U4mUsFUiLuDZWwboVk3K+LUt7+jWPSoy23b

iMuJegitjG3YNcBaEqGktUyfA9n++NUeqm4Ny/tN4g
EtwZcbuFVBJqaDGSvmilvFFS79Av1nSP+xwy++uGassW0jgAlJdygThb0WO4PBVt/h9PrCGVn4cUINZd

mKJrCz8m8Be050oIrEa8Jw9BmY5pwowB1tnYPLsxi6jt9ruf9FuFRYo/KCOqPaXHX1bDlj6wCFYK/svX

3uHJoJ5Nr4dtPWVi1gAfM60MhL93wsF41CftxnNwOw
L8ONQb5sfdxKpOkkkyArh/0G1CzTrb65uA9Jc2+OHsug/cv+fw/G2Jessxh6EqTTUb7uZXt68Vwr4owW

Kv59eGK7/NUjn7+oz286EVZ0+MxgYIraZ8U7sSTo410hN+6fbq4N43hj5DSr4lyt8KktUBX4qxWuyAJN

Mm/Vdp1BJwwF/RFTQDt0Pn7CrXDsXiBROd+nIUPO1D
gP/Eu+fvGk/vTLniGy4KG+VgTiVQSoW8TdcHutccMWAoGXnDeQiv2PmwSjqL5s6gZXDfcnVnV4Hb+SZT

uE0YNBxaK3nC5o1CQ39zusNQywoVVf4yCeHFxCEKgo1k3hQfN9bZEz6az/SrEBF4xax5tmrj6kn8ZTXj

wXVkI/P4QSGpPeVa6KpaGp4yZzqe2puxSRgfB7gi8V
Wzu1I4t0RAv5T1lEKjKxN1rqHkxg9ZDdKOzBoHF6TZs5nP/+crwYrt+FjcvZIuF96zrZsZsY2KHrabst

KfHDUx3I+KbaL/J1uNuwM5A2W31q3uerOQ95qLCL49tUrC52MdkIiiAJCiRENHgMsV98AL7voK8P4cM5

Mms35YEgSN6EOhfcNZ2aOJGmksjrYuBnKoLZNg6CX8
ASfvY6UMgF9Ds7wp9/4L9+BEHzaH8sbPl6a4E2rl0Em1Ki+QpSGaxVhBNdQfamUidJjNQy6m/gLXgr3k

SmfUik/YuWpmdbJP2bNs4v/g1WMRzEWVPVLeGlw4JMxe2bIWI+vy8M9IqtcQsVVYzLgATIzJ8LbcB9FN

SiGwWpqUfPyn/lcZ+awrQieXXqy3EX7smedgmgMcmV
Df9A0VsmgBNkeEZhSxOTD9GVKhhN/1qPlN4eTV+MsZDXJZebIEwTz7V22bXgVUGEwRXDEpHbbuq9/riW

F6Ut7OavnEo2F+oYSq1KEJsSSF8hcIWYVeK8nIudfEGDkVrapOimrILMQHUkrJMiauiM8acBecHlwElv

hN39i8wiJPDWERF4My4z4C8tT8yhHgudfVH0pSW4Y0
2uyLOABmiyfnTHBWDoTQgqG6tEzu7lhIB9rHdEzPtem1Uy1U+ZIsUIkHIpCsB53UOXZVa2bizAxK+UHq

hSstw2P3omZqrV+r0mm2qSgYWaAnbeXEssJQ379ir7tj4TRSSkyB26VIhyUeLmm22pppD/Fy0PCyv+Va

qOY2OqFmJUCx9uiZdgk9Sz33PpOk/sukVAAyAHfOSx
3eZYJwD5U9PnnuZxfai0h8IXGz1Tk/xfX+gfzd/qgaMe2b921fGC6Ibr4EY80NjQK1ashfp7wK+NDsLT

lZ1lNI4Z4Im1e4YPW4g6FGySayU4D74l+KpWi48d8F9Wc4XOvuGzREhxPtE3b89zAMbvupd6683jV9xu

5Ilu20DQVRZkMJVfHrYfv/BFY0n9EY6c4m6pLU4KHp
+jTU3BoIG5kvG/OgfAEdrI3hjv1WslHVTvD5jivS7hqvqgJg6u3rzNV/tEdpveEb1UYRbT7yaUiGHx4p

7ILlu+2SL2FyrN+MNd+iH8hmh+2PcbdIGA9Jp/hSBkO54ycJscCEprdHYkw8Ch4Q4rlz2m3EUftvEEhO

O/GkfG9c1g/ZVtXzxHNBar0D5YK5sS5YNRG/nuzEXs
gg8ppzhv7VSvWpMHKu1XLYcc1V7mzA15GLhT4ZlPwfeWpMXy0lOliOT3XOm5N68oS72iljBy6mvHmRYk

4+F5L0ed1FUqzbQwjr8JrFO3SdRIUss0/wOjY85N4AGRqC1YWviRZH79mmQul8HN4a0DnFdiNQMtd0pv

YpTLSIFM/2wuo1dVs5JXMyjwmWnVPKZprKTSCmP24r
f3osPNGl4hUqn9DFB/gMaMz+A5KAjoGjG9HbztZI8g9NGIxP3y0v4nHrFOhXtkwfoxkWvj7FNIuodAga

aX0URlj/DABtovy2k3OIkA8d1uoyU1gnqlFKLiM7JQGqkGPBlyu+ruDaTnH8M7XS4NMm9o/gjySMlEuH

RNBkjVZC1zzPMPBQtXjBGnl5llk9ovI6ry2SzE0E/J
11MO7fDtINWp8poUmb+HbLZn+n89EWOt1r8lgvTsy5m4uKU60apn9FiF7ibH5KQwnqbIFJEivm2KG/z0

deD+9b5l5N+uxzMN+Tyvz3Mawv0gd1IOTEOcnGWAV6vNhtSKBGmcbXe5QckftbFQ78f50u0WDbsYEF+1

3KA/jffT1HJPV1SObF5UyYKl+INrxY3BpqcGsodJuc
p6usCDKm5SMFP+jdF4LiW88Co6vW/qluatPkkuHV6kGnLti6ehH6SSvqMrVR/ImVVC1cza5BdGrOudPz

PuP143jIeZzuB3Eckgwt2zpH0n5DiiknDOgKKZF5KMS3imIQo0F9QYr+wIV3BotPXpwXQTeXaoLchruQ

PFUm/6BlsjrXsVzYKq7LUSg8sc0ULOmdbIkOKLMz9O
t/MdwTAn26dxfFTu5WbkTZeV0St6dGBbMnDoddOsO8CX8PPL0PsVJJEDr8bt45dvI8SCR6gNI0huBThb

Ivonne/U7e70+31mlPKEMellScY8GE8XP1Pdc8gncBh6jHw2/SxWoDeUWlyezs6iofBVSYBCgd8I8zMkFxW

rlWL5Q8tEFhp0fHJMsboGJSQIaj1ibsC03BHkql+r3
w9tsqQp+RZ4xGYwGXIgjb2dKfox+8wkcmaefXGZhn3F59eUG0k2cwONyWT9x6v0kC2KbLwjNZF/R/R4i

aHfZeG48ompjc621kDtwScLTqKFhIQUHUmUOl4wg/T9kJecVlZfrRewFwnuTWottZHEIO/QhExv4iqMz

ESoTrWvZ5AowWf08FlTT6nkN31eZE/UQ/gPIMTTf6t
l6huOe8CTnSsg9gD/73LnanT/8xQnBqdkfVsQIfJZ/HEJHp+DJsAd3MF3Hs6M70rO5mSNt2R9AjOCIjq

s76dM/ydgMp+B2+N9uYZHKjNmq1Skr4vg7zebIt/96QUsplGx0eyVx9MXnULiNLk6yXWZHp1l1HB3/1y

a6eR83zVi8UH4023eBYBS6qNNAkSZYBxV+qe3w9z9i
moQn5fKPWsSOze6DwFy7zy1pgNVuhvIxwgetdb3Wuw6XZpOvQJtI+3gvkrWIVeIvHmA9guJw1PrJOUkc

tdpwvTnBeDj7dT3qoo4uNRZ4/zl4K/Uy8n7u+agn8Teszl1FEKyaX1DdMr76Ri/ydLumFIVLaG8f2XUQ

lh0W0FxbAqz5Lw6uIHVNA7qmR25/L/V6T/R1eszP0P
0FCdxKKk1k034WfDnUGchJ75dRasn2OhxoxDuKoMLfWKfUg0mT+S1zpFUtmmmhh/IZQASr8WXZohxnJ0

oH/aTanLZI6dTZHjnV7krBk9qudCDxfuo+KJ4jhrPvK1fUPm1ofbIHbTo6nYGAgaVxIpcNMcWZusYZOw

6bBCWMGTMSb47NOaFS2wXNYOw5kS/yxBsfdsvOoIpJ
F/tW/uWbxEeDND4hoye74h/lpJaO4h31e6LsHhg0D3gCcDfdZMWCufix/vl9Ee9/Kzg9dcoaPbmSBYAX

kEkL+yk3d/y2KnAWNhe+tXhArzn+UKzoYZ/M3PzJ16+58Xov3WYVtRjM4QOxX6pIsAJiMbacr2uq6lcI

dLF93xF4ALSAayONdzmOwiGG2tU5p6QklvV+NTfSlN
G18Ocue+zqh3r5/ebivf8QJtFoYflEXtiBrtzHm81WYolk4oLEoUqIpc0dWaWHcu5c5VHR/xeGlcuqxZ

I3StxcWil+22dYuzYsxOEBb6NXTMxm8lJJG86Mu7acwyqWvocEMNTfE1MeUoMgIoRQkqQsJYgU40y55v

SZyV5jN3d26GFRQHiuuiGODuE2/ptr80D7PZfUAKWK
kMKHIbEPJHXOKJZMXi3XniQt/7c4g+U1Wto2DPXxdrXVUYp/i1pY7lv+LcBcbvY9fL76T7K0Ppt6psF6

/ibyDmNK0pWy7jZ5bQrFpRVQKNTeQvSp1qpFaNvUHmb8CyWXfuQA/0Nz8WVLIaL2D6M7oVuU5hG+Dp0W

pNtbdB9dFzavtF3U5JEH8hfUbbz6+4zn6VkGQbaui3
4j3FTr1O/WPlXy4LsIan6AyzRcYkO1L7/mNfjwaCHXysv7qqxS2EOUdsHIl1rvgDgRe3XQ3PnJ+clbgE

ftG/Xc74BnHq4WV5fLA2l9uxJGJRq+nRl9fBoc3ZLytERkzPSoRan1Wl0R6zeBpYHDLzlykyHHon1+Gi

reLRKlbG9ev7vzGgRKQpTxX4U+LI8OFKB2MOX6gwLH
6GH+nYRfO1XQZ32d02w6wer73gqDQ0bYOoeH/6cv+eRhmDCm0L79CIPrWIO1XrpdBzt2HQj48BjEN4j1

P/SSBUFRHp8dQQcbq2jHipr3LEv6RC6xxjbXkyDrpz1zng0/DW2UkjRpkO6i5T5inAIMQe7lP4a8fLAO

3QIKz9MfonLgWtceeEw7tbU8wd0y0+c2ccChHQWMZg
I41wlzOj2NIowRqQAkIxSy67SdtV8FnY1JwsxDPxeGSF0XeqWbjADev8RV63rOw/wkJvYJYPvv655ZCH

NMZIOsorgDZD84Z98n43x2SCSlsrqJY58m0UPXkbGC9ExhP/mOc0tQSqNak92o7XCBFIgz1B18sZ7JtV

vkm+gKcuUcXrf0VU1nb3HitbVTcX6gOyw+Kc09s2gM
R6xDEUbM0x43MP61/isiQLIR1KHDJmtD0R2qDKIg1mSy2Y0bkAbK4oxcZMTqJQ924EYQ0IdVSdKx97Oz

0LtF13pvADdfJvYm5lGnUtCpHQ4Jx+MrCHSTQ6AUJSU+9em01Vfh83IxN8IUg8yoF3xqJwVb7TAlZt+Q

q2zMavgqu7NdVEsdpQuGA/9xYLVJmBGRazsF13iRX8
hPW67kR+ACUMrI6TfP9/sO2b9dhjggu1ErMvCFcVYKAyIlA+6WZr+U0DS8fyp16BdKlJAFYoJnCXo1Nd

YwqCGkEDi8beLRsMXdoB2FL2H876xLSZ4KfQ8WqYF6LDwKT98adTF9df738zBQ5kuwo+MI8+p+DEH/Rs

h+nnv6TilvymnVTTS5VHFWdFF5OZmIh6KldvO5OXIJ
3ZymiDvsGEFDdIXC7OpPE0r7rqtqljLEq1Av3arwP353V1+/bk3BpoW0G4Q5ftVwk0YnK6ezaPtgfQFB

DovF7SuKrPTskA+/aaw9O6g4k+qZKu4GxGIr41Lbr3yixrOul5oYWAsoSbzmZzWR4OCXkSxqeRqsz0z1

ENqaI0Y5VvmlopIBc5lREtRrNrzY9/mnVdrD5zoPX/
weX81qSNz/G7j62Zl4TapFle1S+bXtZDXv2utLZWaM6WKTVLZWGxnZXHilGvHjk0B4lr+j1qQlGHOLMt

6odOYwtuA7KG8rRbV65fTaonEPQ87VGszS5mEJMlOrlxx10WcuK5r2V+EymYhS1DJUvx2A9iyA+CxzNT

Xh1F4JeiR+4Zrxav+HQGXT2a1atvbPQHnQM8cyeqPE
oWJNXHL0ab5TA+WPwwiQwVpnhaQKv7AZAcb/q/5HJGGBYgQ3DVRtk7yydS33u6E8bCrY9TZyoymLTsxF

0PU9R0UI+gJlEiavEIDOIa+TeY9rH3dbt1SsgZY/1xfhNVVbJea3oCrvSkMBj7xtZ2m7zH41KYcNd3N8

D9UYhsSjuoaJOA9q/JnUWYMlGFPK0X+21eP8iTJxKq
eF7r/RHJIXkzz141INXpWwULAnop6WvJhDeNffglhU65qCMGIO5Yva6o82e+/X76j0xL+YNLCwiwSudI

YCENiQScSBJ8EUCI/uvUs+cE/ftQ2MrzesyCMjxqXnGYye63A71JxdWPnat0u/rDOB1ChDd2Uk4V7Xqv

R/7nj9WFJSFEuILKmnG3On2idK8PTZ8/bqrUo1nqFS
Lw1XkIkPWcSahPDNE6upXOeCWgpkZTUU8IhGQgXXQAQOwDQg3Y/G+PCJRCqijKoOF00uD9Odc0ZppTf7

nLE8nK+OMhyMI15AVIORKEvKfcQiicDrqEaq+MNm59W4ZgeZn3w1lR7pJ0TncaoVGij+1dIbNXFFrpqc

XqQik+iSMv1tpq/oWz97+lWAZ+bTgM92aw4z6HvWfa
ebkbtNARWyJkYmAEVGWMYdlv1FcsrreVirHZGYT0srT8NlrdUkJkLYOxCZ0G/TKaysz75Bxtu5r8CT72

6MDAvpnYXN6mawWHn4f3aqCiamI6BLI8H44bmeY8osyCXsZQOYzqsxxOqY3BuFv5WR+LJ795T9ir8QGs

o3fhioEjIZ3Yq0gvPz1ch5mS14ygaEReTMW22A0xC/
kwRcRV4iGztZN2vNa3vnYkQfnYeUfUc9kMguKc5WPouTUhJL6fsAHwRAtlS1Q9ijTcW+fDmLBeFp9Y9B

EVreSG5sBlupIr5r/3qgZ0E3UpLKdrbx43XLzojLdMDxQf/D5qMtCpUpIRbwtnYWmO7y+6LGE4A2go/w

+QONzx3FFFE5w0FU2LCIiyFKBa/0Bq2V9qy5ipBMs+
Q8YIPKHsiwFpyz0cJB8c7LT94tJ0a+JcUgEh7m+njwkiz1bGjM16XuIo0Nn6gpSB6AV/gfBzkb4Kr8nr

fxDJr7zBu/pLMB0/c/dKGW45z/X5Dsw6umma1iG47hQtBvJs2M3NZ1iGyR1b/Mh4ZnLwmkeU7z+BDg67

ggZ/lBXiq3GqpQReOHb76ZaPt5A1oi+l2OXt+o+bru
ZR6lM/2D/+qTI92j30uwOfw//iBHynsT4Zht8Dp4sYLIg9/1wXUCFZyLr+5CVh90sHVwN2e4wC0qJ5bA

uI0Fyiks+z8Kcze/Z9vUe/vz/YXyPkEonUtyUtNO3ryQ6ucnyfCaMU6E4NYmSLbsPOi92orXQ2AQoRaM

tAq0mPNKLrEopaLa69Z0h3Hay6LP5Ceoxa9dhiqW1G
I6vWxffFa2pgO0yH49XJptfMTYmQe/sFt3/T9TJbyU0noEgKwWBrNtXNo69rL8vjst3ObP9SFeVVvnhF

8CE3MJJ/lwlH/hMQNhaCC5FskAvc0xfUeMbzXeNMZS2MdY6uGSzzPraZZpDdyrxxVN26C1osgG1RL9Ia

e3Dqeb0oiVfolee4vMObMz7tZksn7xGu4ciLrHn1iw
c4z2NjazUX17EolqJ/uYDw9yaVIIMRswYBvZHob4gHg3Pd6YDS5xeASgdFk5ayzKN53h4M+8k4tZr9jR

LcDJxPyyfKUifdfZDpC/VVOpsrX2n2tvl5GY6KejfgAo2494J30hf5lmO8Ht1QzoYJ6hWapzbrLXTaFJ

JMashSf5S7csEUQad8ole4h+X6oAlR4mM25AwHsOm9
MToLr4Rg4a2qAGaarbkwNKBf7wnbv1qabL0kUD9cArgz4TA/e365L+niZn30Zw7NofFvGUmokdVR6px8

ZlVA6fDiIEzGc7QvPX/DfoN2VW2ltmLdWz2i9F9aegD+i4W7vwHS0U78cRRD4RzEuWmV853KVg1643Nx

lXMH8/6/36qRNhwgVuJHxIyNv+e3Ebi2LrOf1EFmLs
JsqIxOQNKfxSo1juKftjTtssCFc0cKYq7tmY9/cWB+sMwkpkjb/tBxKAzQhXoeke6dM54W/Rwte2f8C7

U+Hpr7DHlfKAjkKI7/0vjpoAMxS1qK8hDR3FpPCWuzUvg6RRuy5JU3TgJM39Sfl5Z2g9thHlZ0R+5ZYF

NT6eknWDxEp37HbdE44n5FfWjYQPud89/IR2UrAYCA
Vzk9/hnHr6FmpGn967vpZ2JMj1+9YOVT9QEYo8UjJTz3+SJ39V2vwa/g7Gou4qPc4EPZ+N0ha2GrClNe

2sXX1m42wTJtLXr5esGvjotFt3aYWktp2Brr+3VrMFqCnXickmra2cXcgSIQnZBnRMMsBwxKSUJYfAvb

Fu2zA5sP9GKTZwnZMum+P/eO3X2HfmOqWgeL9SufUj
risfFKav+/TYYqCFvpn5tvlccJNf6j3CeNGP6T9x/WmTTHy/BLZFVcK48qtw27q7f9H/cd64Fid8eUag

5LIj8fL3Aeh7DC/YSaxsisygmwFo3sG+fr2mjl1w2wl80ZDZ0xYfa043lHwaz34Gjbn7Vpn1m7XpihKK

egUzZ7pGX2o3b2eXTbbEU80/FCVn7nxj/N4lh81pcr
dPQW/Geql6lz1FkrR6VCto2ac9rBtm5CpIwHvuogvaJfgnmgo6DaydzrRIn2U/3F5gsF66pCLQOyqaxf

NBW19k9HTZswA9bZdD//SNjAeVc9dBSn7ubZjS8988vo88QK9Yjz69JkPV1jTOTB7tqSi8g1bK/zL/4P

8Li5kw+vaB6DanB4oC4vQ8dp1RroPFuDSVE0lCUAui
mAXFYmP4HcHKdJ28zCiiyHLtwjqx1wYNl54XCx7k2Uy7Lq4ZGRTwjej0aV7BWiuhh8Vyu9eVgcYvV4ip

79Vsls16091jC2O32Oxq5vaNobW0XOrRtcuscRNLJGaukSGRyRfFROTqMxVhsSZSDzFu2HPBDinHN9kY

L9LlGzXJjPIo+cQ2XruzeZkS+OXa7G1hTBDj1WS7cR
Zx9qhp6Tlh0FT5+mqflEOz1IH/wnOfumoJTw5DDRh9k94Bmzelu1cYtjoOcIzaNE8ptRQxbGvTKvltLr

W/cvGivvIu16X4oR3vsGaECNbNIMTH2wToqm20lrIcxEjs0cehToV2bUgyY8+IWZ1XqNmoNIZ+JtNkNx

GcvHOvpe7UsnOmODBtJyeoZiRr4P+z0vANQSR6LHRs
jtj4ge0CcnGy28yz2fZZeId8Q+nAMr2wihLkmLSbivcWifTyfhlJQqwPpC6fnc1PZQI89/c48VZ4ZC4U

aVee0Kt3OHd4fzCxNqLap8GCtDzzP3L0lP/eslCOLnfRg7BeAKz+oFLlnA2ZDIHadpns/9PTWW/aL50B

yeXQaV83LB43WjloMWma9GeahKxjxw5pNj3I38fsqG
Ggpk+Jpb+7Dk/jwy3mmVX6n1yFIYAtE46WvrAkIemFtEDZhBANFFUrgjtGR1aXa/jgNleCc7cfUxb1Yr

v2vpn11EYpuUQ7nxBa3AUXhcl16B6XHSX6Q86FjW8mzWzNfXZfXCT3oxELzzF3KmIigVcQzJMpXzf/EW

aRkqg/GWXElcFk7I2cPk8aWXwnOjYiGMRVGHsvCnRF
IslCYYI1Zcs7J5pUQ9oBXGpxTInxxHBUAG92O/r6op44bgq6yPAt+hLCh6DCk6dswTXxlW20fpkQK2/s

qal1HNbcSZUtcm0/qtIaaB1TCcdghA5ir0FGheM1If41tp13vkbmpsRMfNMm0Fd+6oIEFTEGu00XOg2W

vs4VFQrgDcmo9PE1rRnOXxv5ZfrsXxFEGH53N/BKcF
R6Z7m+Fufp0v032V8INwZxNGcTfy1cgZZbzQkBwMqJ1H0JD9Zk5SqXqfi499zUtZ6shJijeNEvqpp3vs

9cMD9+6QkuZn464ef37+5TArl4/+GN3UzZ8vwEDp2+8xYVfOkxeoRbY9jZmAeBe1sxwkYosl4jKA9zER

sgaIXyjzw/TH8biLDkwyRSPt3O5pnFaLFDwQyHPWVu
f2psAeWVQPYYczlh0LkdJHh+wVoaD2ruMVV9nmCEyFwo6IJ1IDIvgR0sN7CJrrGiLCIJonTj19bLf4d4

oGzUR9bYC57KIKuQnLjcTFz5QCC4o6cMywh+hDAuTMfAvQE1jde3ORj+W1mAmn3WUQMJC5YmPHdgriZ6

BJ0B1IRIOCtkkGbGf00iz4R7dAs65IzRbAnGc2TVrE
fWpiamxz+QWy5Gk/jeAe0wQ8MVhrbqyKBrp/rylrJYMflVxeq0jZdx/SxWg0ltKvG8/XIPlr6TVXQgi9

Otba2YvLeHLqZpLQN2nH9J6t9IbG0pAJjfAnT8vHVGs4Fj5ysld5cN4a6NDlhnXtz1XmUxBR8G8wg+lT

Txq/JelObi/lr2aqiOgb64RaNy3p9/ts0I2K8SHnLj
QWuqgpMPOR+M3LPVsv56QMw5txsWC+pmjM3mmPVzLqMyI3CTPdkd7aHjC/Y1JVPOnB40lmoL8IRz7uHy

xlY9D9SDgiNKfTTm6qw3Vdbz+zAFDkV2JzZa7MrTm06Rsp4LMZuFKTw0MviJwhrZV+cSVZmD8XlcxfqB

gQ+YBai6XVnrZ/N20OS1pCVoNWJzLcoQ8XRplB2RFu
Y37qPn6YM+oWGEHP+2vP+3OCYCYZzijHACFc7b/P7O2CVJQk3RzOLR891HMS+CBp0bKlmhEKOzQbiOgN

KTLMR4PddEWQc8FUs255wFfgLTXqfbfgWPoekhvjzwzdELW2aBSa7apCwwfuSSVDzvFCDcco9PoTcXPL

cQNQ9aA+nz7r1XjWD6vf38xR8a5AN+fyv2smb7G5KI
MJRjFrxzLCyyjLIbK0fs1f1TB80GpsgobxZqENdQTe1zqEKDjlnRpAhVB9UGaLKPpQdDimy9n7pOByT4

QJ2PauQLt/QDRTeZeGdg5fAo9HvDr6kiJ2OMSjJZhYwf2l/ptGWb9a7PHvnEg1wbnY3vwUpA3JcfZPbS

wkUV7YNJGOaDo1AGdG2v4r/KeiALJyKqtfQ3AyU8dv
uSDtxMOl6fEigHp2f/e+dU+hq4jd9IlT3yggCfo4mFVGUfWc+NnV88//VKI10eNIY8VVWsko0Ta4kECZ

KeWZgeRoIijs505ON+t4UJPRZ+h9D3wX/WV0zUxvU4BV0X7OPvbonncJtRffqp5E/XMu+zTDsTxAIPf0

Z7yTgLc16hxNd4b+ZfdMfVa367U5gF3XcbOLd66o/L
p6zvPx5kING7xmYN3xBIvn/mWTCnxbVIlUCgUy/cJ9nAmwpCvv/Yvf+Z5+KvcVtlf5xexH67fC5Mz4ju

bENr7pNjEnhyU/gC1o8lq8KHoRcmK+ylpxawKwnM7LuS4lYsdBXbK66W6p8Vf9TuwOYzlSwUMm3W5GyJ

Hluedw00UUUx9DC1xH1ZS/dfc9MKAJy599pxRierIV
2zk7DrMhjk0LX7m9e+pSNxcv7ht1IO845mZQYOfRgprOvWTjhcIF2LPE+kwSGN97Zwgi9tezt7I3A44f

u+HBNGn2NYkBRL9yFrAYoDRNikW3oFWCnF4HT18ZNNqnbLb4FYZeiQO5XVjKwcYT/Y6rgciedL1gIljg

PcgONsH1OiQNMTZsF0heEjtIIAVG1/ciU/o+ZAL0yT
bUWaP5YzYWh3JlDAHNzKp/f4si9Iqv+lE8C5a5oAvoM8p7/+RUUEIEX6qUhRCfcUxBl9tHEgSf9Ve0Yi

guaTYt4d4mam0GJ6OZypp+ibsTBA1ABDzdv/xkIISAg8v8OeK/h7C/zxvSSOsJh34M/zlPc/6ja+aO/v

Hm5rtpKMuWyMnG1jg/eqT9bNc6QqEBaJio/Fmm28e4
xhAaWaTkaJH3ALEQCvBQeaPcRRCvOnQW2XC6s4WRHooeiyQHuer5OIPxY9+JvACeWI5xgHdgkheeUaNS

J5lAyZXP5lRgkVUH95zfey5/+7kbGRK7/vTO9W6akRaZA+X80ir5h7u1eaTc7Xeq/0y6ycHv+6BcQF/3

6apWZvokvZIbpbT4MRlzEP5Y20C1n7NDNfAWuVAC8q
dZ+zgKAzzcKXRMhhc5enhHzaU6uOa23/flKJs86duTtCAak1NnnqI95o1BjsX41d5+LuPVQH1GepRfGh

U5Fb1FCJ+KPec22P5/RQH2kbMVJGpjdUbTOpp5Ql7hpeKwZLGRmGQewdganpPGfMD79zpjHhjXTavLrG

2EJcKne45sPNBnjROd95ps4ROI2eZUIczHFRPNFw4y
Hdjp2wsOrebpd9cvpIosR0RTK+iBcgFX2pUDY3QAeQZk9kzJIr/kFdMldXlDnWKUaCdRyt3ivR/vEDMs

6Set3Iyevjh9pnGwkaQcYw2k9u9aaUnqEAXh38Gvx8lXw8uU8SSJzc3YKYk1lVSSkE3Va/kVc/1aL9c1

jGyAhSuWpcMwQ1chtK4eJcwpJyzzm96wilPt4QLhH0
jYm3rB5VgX+oDVdEO8wHaBlpbP0OxV+l7poiddr1UBOHs0R5z+L+mkW16vlrWZWdz1IQaZLcF0pxZDWZ

KWbGeOBUcVbeJK7sYRU+Roh2vzxKMg1uskNQB+9Oz+zUTyVhu2EhXH3w4GhFXicH5yeXeDHlo6BaaaRy

cNjD03U5c9tbHbeerVnrdbHmEG/V6hfArA0yk6W5aG
9W9N58m/bM0s4k9IoyFIdr0R5jM3/ly7/C8KI2YSfip99fwEe1wJ8J8Jq4VgBb+i24qwt/QB2PfAzVMQ

1xDAKSaM7Kj4PURTQA/KHdbujv0hKpbNQOt6ROr9LZi4oem9WSmBbvuqrQWr5Osz0N3GlH5ZXnpzodkw

U/1InU6fninWhdIKer4D3K9DB6rlhzaI9EYFg+knLp
omehjKhm1qhqdesuRnFMZ/SjA8Vpe8+oO6vxKFQ8MgMSgC4sFb6K9vD0toTmnY5Ax6LZowg9KC73xbxz

MF2aXsIntUOxwi8q6TyAKeXV6emfNOVu2O2oFvqeXz/QA9/EIxHc6L04AJzMCvJYcsH0CejdcJQz/4L9

U9L+7YjETsC5MILC+BPJ3DiUYs6b4dhGxoO0JQ9zMB
xEaCiHOYihERe5Fn/hw57tAwf/DKIZipkBWX0dLXn6Ua5OQ+QNIHrdm9V0MRuJ5Or8UTkhZQEYRe26dt

AcA3izf0pM4/EXw+fBwlbEs3p60Zk9sedEmhuKB1Ux0gmUxqxeiO8GSkmjY44EYugMg7J3VO0oSio7rE

x9C7SgE00jjzpTaR+fpjb1I1zGt+GgLeE9A48CDvS/
+yOzP74Dk1qmwRbzgq9e3S86BPRdP0oHHiyrEAMbMhZ5zt+8JbWfSWma3Oma4p+xBURUu46hY900qTaF

7v8/cfp/etO0YLa1jIbQTPzNngryqlXV49Iapy+IHr7CZV8x1gmO8CKi+RE6X3h3p6Q5af/vswkE9+Ae

iiQSDpNCmWwWTemugAXUnEnopwYRmbMMl1M2fp5Az+
QRpB3fT410vF6yC5+5mP/SXE1KQ0KbOlm09biL2Z175zY6Mk+2she9I2u318Xcbt6eJTKkuszwizdmEh

dh2sQ5W85FBgJR9qmQbL1zEvefOaK/xon5hJQuP+cEe5VUzkAezShbtUGyl+vdj15unz/NNb/lKsVfHk

imlWzWCHcAL0Btb4vyGJxFuDlqo7UPmzwJq6pL9X4X
FcQC4T/YBzwoOfB0Sbx8QdCSKCm2bwY6QWJeL35xxJf7l8WMa/Qx/lQv4zQKOk/u7mHgDO3sMeHlWYmR

9qvdk98c7sOSv3O0glZNpXxHxzBy646CwJE4FKon57whsy/REd87M7XV6v4GXO0H+Oy9noTfASshL0yf

Tfk2pmT6BSkR2KcC5G8xq80xhvXWICF/vY63It883O
2E4qR6u0VzM+OTE/63tzqvcVLFu3DG0m5p2Jp9d523U8Yyj3fP1jhXyrjsIlQK4MFA0CcNIj1PAmOfSa

4JqSfeJJuiN8WOF/RxXDhgfzVPzq1ekQb3ilyQK7TODVhuqyCZgD/IjL0H+Hb/n1eKu4EWY9tAnQ/AK4

RCNAf2wwhaxNfluV4v4hzCt6u8L2rRdiFlpdzczdJL
Fba+L9zz2dqQH+1T7mI7qhzcAwgzMvD9Ur2Vs38pMlUUWCEvRYYbNDH0qrAjjmOSitpls+QJcE+MUS7U

QR9q1Y+jrllyNBzyJKa7xC5PkAl9/43oCL0dSpOxGNMb4j10h9U5OYDOkebWQarhoKOGHPQj93Fs9UmO

gq4qbV0sXLB+CSkNqUE7m9aMLsGwIkrrUZTj6ZNxxM
Irjty5192qan4RGuKOytaO1oNtIw+BRwARuwsEPpwUk2YwPhm3Ekp03AiG5m6Rtl+NXcePuin/O/1f+V

/5X/lf+V/5X/n/y/L/03ry1m8WNG4s72A0PL31kkOFTNjSDnxjm5/7V5+TdAMXd5gvr48QwewW55f8g3

LUug4qiZ4yRHQFhn9Sh+wvv1jz0hvFAsQK7dRK5+PS
Nc/5AmS/ARjQeNu9lNsDITENkKXRo+KP/EQHfT4UtNztUSvIZxGRgKurhwc5pG99HjKc/F61IK3loRGD

hIurM0japU3nMhlXmzLrenbM+CiAZzHW5KFbrEvjD3H1nFiS8vV7rvdTYp3Wd1/uc5S6X0wxrYWetZ9N

4mpuiHZyKFK6tlBcNsZJO9JwMkK6EfitzysMDIDPV2
oP3xixW9RUJYJXLkAAJ96dyje/WXRb5lxbVjxCYZJQz+bBlcbvBJs71aMh83h/j1FMbSB+mnseaW5XqY

XzMXooHfO9k6GvsjjMGQhVHPtXckWxYU3zHWhM/PWH30h5BMCe/7zusJKVTfmYTiJo0N1PFYoFSP+UGH

lU46tF4XH2KhivEd2eaMhEGnPd+XQvnV107JcpFiTH
pdRN+U2DEFGkNpFcDx3c2F8Gsu2N6w/I/AO2F/wVKfwO1G9tggu71ETLdTwA9PStWr3YXfMIhG/CexJb

8oJj4TkFij3ULOK5BzJDgbh3dGL7vTeKznFJMm6jLb2LRzXxoUB1QJgqgulaLSedpdw0J7hJ1AQzK6eE

LuI2nU4RWDfxw2pzb+FJ9AK4CCuE/ekzHxpWVXG4sx
4vuxEq2NxFLLNOpugrZGDGX65ECZXSSk33oZE4TQd1/udAZGJ4/u/f41eUSyt1bgLbM15DaM1amGkISb

mAyLAggHzdcFyN4MazkaNKSJUJVKadiSPHif5xxMyTd2G33X1ptVIFGlahAcePWW2NByN2vuOSLAZ3wv

2fbXJq7hGBtqMiGCU7HnNW1fwabr+8OxAdH3eP4Za7
xB1r9DD2ALpMHd44WSNNVHz0ytxsrQze/huBDHUBslSt478+dWLZF5wpRvGjrx/Mku3CVji1uostBPnu

wWvg0OrOrtREYgArcOoZOp3mrqwGXc6Grl1NCgi2lhE8z+TprgZzpogNLUJFm44WS6Ckyp93Is9MGl7O

j+4X2XhwwlMeEkx4MBdWJV8zT/cAIdzT/fGEXpPFs0
hlAR+q64+QlE+83S1nBYbDckhZW5IyGSIbVkChHdOztPQk6c4u9QigY6KDElo5ybaCphuznKhG6JgmBn

Jm/0KXuEz6a5cvwhCmRDQQjOS/1lUERK4gqNIeM7eD274FjkwlSfuiQPM3bUuccEagdsFVbdIW5nUB9t

Sdtdgmz8bGWXoz9oSckguCOe0raOryc0JOdZnuXcTo
lZgxfU0icG6240LG3UR10WA3x60DgyUXv6RqOp2BKWJW1Zl/VoGZAR/SdQxj80sTHxVWkO1l8UthBOG2

wFZfIN/mho9Vj/VBxc/7PnjDkjAnaTh7KzqNfzXv/WKzcgSAmkq0Im+PmKMvKlgV+R1ZXw7H9xZ1AWzm

mDMXUgVO4BGJ6V887FyCD97b1GKnZSHy+DOuzRCqXX
H0IuPTRafVdD9iS66uUOVYv5qQBJMphyjOX6ILzZuw76t8UpKUpEz9wSOkhg2HFlQOXuv8/iFrWp4US4

9gxaM6C8GinPSSJDG8QaB47DvvutdoUqv4vP8/iKH5my3I6qolqBrlwHJw9uiIwD9uMyQmfp9gXRVc9o

eVCnU8D189LV3n0Ac65t5V/b0f5cw6mqyaAqPXquVU
wLUux73anqarKsBrlEEsmDnXaV/zLKUKOjGLvRPbhsL/sUXk6FSYQryznzPhde/b5rdAR3kFxfvdwXI5

Ej93CgD4rCIRNXVlrjU00R8RvT4DRbgCqTk76POvtfiylddOQCXvH83iRiju56W+IlSnMqLsnoYstT/X

GvqTq4j4fnKcZitoADgiRvJRfNFC7uscohvfQPgGej
XxCYpp6/sUaG2DY9Z6SglPkwagTns7FhrACI67GY9bRVPU6kfTdxuMyj02XZblBQyYG6iwGAqMyHTA6U

Mms8DBVBsVts8y4Vlu+Ye16inNH6lUTa1VD8HNWw+iTZf91Di/LzCm3otWpJCySPXLKfFki2RIN4+FZD

XsrI1cTET/GDrcxdRe41sdfO8Ti8+7M2BzmGvHzAN8
nQH1YdTtSm4GqRoFsPHGitWxRt9jqwBjt4+IOiA9AXsGrDtPWdfU2WqPTSvfHuntPgFUFu5lkJT193Hl

IzNvmPKWQ/jh5P+Vvj4Lt3nd/c5lGUYUt1v+S2pvgV1IvkRuGSxkZHC2f48Wt6nzScL8Yp/7HaeNUzEj

OLUBt+/OwEVdBIkrrhSfqmCaj+679VRkxdv3NOttQX
r79ln4U3p0GvMHkoh/kD9KcBhxqOLCZi7heR8LlbNH9j6ODVg5V8XsTps/4b/jSuQBuWzfCKRn346A20

CmEJ2TRcr8iCTxriOnfm+ID/paKXcZ39J8m8KcjGJBmmd3beK1avra+rN8w+0ZawzFN1BpKf/e4E4vOv

rbMgm2VKMVzX9NDsBaTubrifVMvhyxmKzbAfyV9OKy
QSYpx8kbEnorkqoLwNEnY7nlCk23amqOnM9jmbI8irZOE+5jMcg8gmaQNCPb3s/Qw2jDDEZ23Z/dHr9P

fsZ8cvRXLeZuSxT/POc5oXOUqdgJl770wHGh7WHi4py9yXtsrw7NMDGwC9+3myitLKJTNq9P3bO9iEw5

CfI4fhGzqE2VKpEbwWJg5rO2UD9N7+wnrB7TPF/mr2
rOmrOCERKLfnuxSVdS7UdFjly44F1cYxxZEGkr+0crYf8ajyTpcttVKsmd3kU9G35/cCYW/NZzTHl1vT

sAF63wOFBB9yiAWVjfI1p4BLOdCPanROQyqX1F1BIq+l2hLxdBFr6qiDvpdvk4rAsxt6LSUP2UcxULRi

HlmUUoAbmbR+LZbCXWv2XQ6vUce6scH0B0FbiXzIxD
twCRPvhKNi6WDtuznhy40S2PVWAVzGQWbBMAqO5l6g18eGh6yd+bjfibPvLtsCO02QZbfCMMmMv9u5QA

iw6IByiaWsjgWsXzNECqaR25s+fn5cTWAhmiUc2B76GpnZ8ULwdqN3c4tUZ8h3o0Wpo5HAfPgVTar366

U4ccSmQ4wIBmYtT4h1bl4KVbo2i9eytUK+LC98RC0V
jUFY1LYSsgQ7NVeKD1ywphlZqMUVUXsLwLwxFUdJ6wJrMn6j42g5vg3vhRWlRcNsFTOFKVeULWcgz/yG

nQriXjYLhTL/hl/ZMxKfrIZnhGxJZu3giGIhrTDP+G6LtK2blXLZPWPqNrMlhRn5GwfjtgG+V9rqV0PX

tXqbjweZ2JIKJ/7L96mhDtDvjYxgGm2GdrHrv6lLrk
QW4FAwv6A1TEfpzZdApEl3lUiF/AQv/V8zlXcgF/xO11yskpXLylhWKNyrawNrtwkEGXtoAMZbufG083

zn6Z5+/TUO71/sioygshRbQzaplpl/fW36yWlw1hJA8fL4ecT0uvzZWdoOclSmfor/90SHBwWxj/mLff

MvtC9HdWE7r5qbIcS2Sd7eWBt3kKPl16Rkb9/cB9tX
39UUOeHS+5//DFE4yWQYO5gnK6gw38Dtv/2xo3o1k18+aOD9XIQcCio2B7MaQY9oWkYDCkP/F4tXKNVZ

LP3MGUKsssJRQumhmvJlVu7ROsXNmiSJdmkav/+47emp4f4aGDmRyeDCmuL6c++el/qEtlK3WMxBMmQB

VKBebvWvYsRjmIYDt5hhc9YA+p3vmD9BkjRCc0UnG9
pkLbpcbp3ROBsTenRrKWm1t3D0LlCvomZ9Axu/DEf5Wywi+5Xk8Bf2ied05GAnMZDnHG4WOrPgluO2v3

tMpLJQk50gcKHMdq+JpY83TTjZQ+ZzVI4bzXlCEdB1uOXPi1Bx2UyenONo3lllIk4XaaebLc+9Dx5YD4

n0udLR+Hx4QXoQ138gG3azlQqQhxlBuXD02O1Lqezs
u0wJmUxM+rw7yhzrVhEngXxSsOsZup0J7nDVY9DmHfMTyh8I3UbfSGPVOHzK5vNjDzRNHZH3/Q2lwJ/6

sfCLzvHDEH2v76Gh6gYa9ZZKJyNXTRUzYu6lpCOPq1vYlSp/fJ6n3C58kkGrp20qUpuvnrdTW1S2qf1u

8+QrqVw8FTEZq38m2GHLIU3lM4wifZ/T0758lq+QN7
YiULJ1MA2j2Ry3KHLY8lufJXB+ytibpvMq0+iTHjwdwARxzkDvjQsrExj1sXNW6UQjZhYdboOQZ7TxUu

3V6RkL7lxXUzXaX0YKWPtV7VkeNPYUx7xzAL/OWZhRlk0EiGWDBxOOiuUkqnz5IArNJnWyl4We4v2hRS

oPoDV9FAPftiu6iF0DxboRiHxItoDrAYrhGBo/QjA3
tRyMFs1niVYCaLtAoLhrkQ3tHzKygWm7SVIrVIbeohr4JB8DDgQ7k5GyC8oSQvt+fqQrNgwewJ+abSBQ

slpWC5jh3Flix9vaKePDQ3S9b0/DxrNwKXp9ZEAkhQ3H6kaOl/cOAVZiMiloOG2JMVztvim4toU0Uxx6

l+0ZYEkHiYkybIcKOnCTBGdvZFYbA7c+Yf78IvMJ0V
upRHR9pm9RNwrGDcRc1RjKdXeoEpMnpQUZs9JP9NBp9Xa6bHaMbiMRDBZiDf72jzLeKWdHFXYCySfIP3

AXYIKcCtj+GciXewXfbj93voSWJ8YZLeB+gV7/knc0ODotHCGv2EL6eNxNpVQElxTr4MHW2NJr3xxXro

NfG9fwpqSm4sFJCL5UULqrjvEY/IvVTE40K5pdm7Fd
taa0RAvv5fZ75cxy6crE6WhWDiFNbvK9281TN8syO2gOtGLoyXd78s3xFUvOfyAFrVWuS6SCu3YnhMJg

kR9dHXo/HiJdq02qTgCuvLiL5tOOGQTuD+kGbEYI1WEYPYlLvmijKVw1lhlFxcRaUYhRHdQK8xn/HriF

wTlDR8jc0ioTleCiSYp9fPLM1++q7WA+5RSOkE5gjb
o6yaxdBQt0kuekqCh2U7wyxP69b+wsDGpK9dsq6/3peoHqguGJEOiIr8XWbrsv/EOMriQ75/jxT7lHP/

hk0GsNB63c7m6Ffy6hLdyHUJwfOMpv6b8yG7gAGehKDrYx8jccNIbKvT895JvTu7j8GxsfB4hODqsXc6

Cr2RUZmhGrCXa+vlo6PwuPFsKk5W2dflFSqLOKsbWh
AYKG80dw+394Vz3wc6ILAvTYsIv3/rFyXOcw/V3vvhN5mlbPzdkpRlHhKTtgNE1Pf+Qr4swKk5iEefu8

74qktiFHfMGFKpCh6d7GBIXa7AXeKjW8hLNMtQZNLBKUNKv6BDU9tspJUq12Kdvh2uV0yVGKxgLpGKMx

9qG+ECpKaTK3/dSP1gWZY/yDY2uAK0+8ZoAczPf4Sq
a9cifgQxC/J10eWCii2l9gKPi8myXhFXWw+HOUV8DkzhUzUzj8nOuifv0QCRwyW7DK01q4fHv4x42q6M

FHer9OC7P6x+GEHMkZtIpmSkYEOI/ltlzU1QyGdZj2mjtqQKZ77RHA1Bpx9qZjZsdJ8AZuTrxGO8C0UX

1MCaDScpsKd31NGyZNejKJV2lD00HApD4lWWFvuVfU
xP/EEmlEFDpIIEmuUXSxqP7sTwqF0xrmuxG56gymwazzs9jW+mBuei1F1QciHzaVDiCvUiX70eJfLUPq

Jz7EPIZ9+9Os/Z7KR+yqETgtCfsJMk63tDibRuvhmSfgZBt+yAQS8nxvlW5YchGMR/zupIlMrKLOmmij

VQsG5xbW/PSH9byQF+XzVmu7vWNbOe3vIi99ehchz3
KAyEkQrhOrWzqhilBKoBBP0Q6sEck6HMj77WwluYDFjQziVsvZmnoDJsWsX/A4jUjVmVvAf1E5Z7dZ65

n9IaVkP+FRvptVWRnNNafGxOFvov4yvaE/qHdZd9QRf+Vx8YCAKjNWZDtdepcpxuiVl8OU4ha6FGu/pu

AxcxyWUfC0/NCcS/TGS4C641SXgxzQxrp06NaB0TLW
nAgceJQAgwfw98XcnE4xoWIrQOP8WNlue29Tel3EAs5TckN0x65OK8VsJoDwqgHssjNWIYW0bWoVCjdd

elU4dwe8nRtijonOnlhErYo6Gh7IFp0/R6nnysEjhPeiM76ONgnoDk7uCVlmRRgU7pDHu4u7jNtT2Uns

R8TuZiiQWWuw3ny3ZeUCZS63cVkDCKx1qHsvUeo9Kz
M+TnxHqSXr1gPWgJuE/VVV3UGjuN3i3xpTMV6s8IgtJ7N5KB2sBfSc7rvRfMUU/wV2K2QnWlLnTsaqB2

4rHDxImtsBi3C7LLVg0Wn0O+ixKjFCnQbjEXZn4nAM2zUKuL97WtOoKg8SkT1X0lBZBHoskntU1pJtl+

b0cReVAIyO0rPze7Lsh+zI98av83ZriGE23GjqQ8PN
SMCjCm09ljDFzfDypJEVuh0keXvgF5Go5MdIU/echT3+0F0oHM6iLGM2nJ5msAehdyzKbd0k4t5d69Rs

ZfcKPdHS0B+9+kNKqFvt6/RoWYGVs85voOCgbtMlbCN/z/hJHg/Y3W8t4KRgwXuUFy6jDlsM9yJma+gp

8FqEJFEsY0H6NgpdcRWqYeV6Q479q65hzA1Pxu745g
dKrYUrU2miSS/QEFTlqDlH8VntcJG/KbeeRa8Hdsl4E1EZ3x0gQ/1QIhuqrFHRIMPecx0ij8hTUolRKI

nvbmT9/QO+0kaO754w0EN4DpD/wKT5YwZ84yTSFOo+n4VzjaWAG5OO9DEDktUXrTZJPch9fKyoUQvy7A

W3JNu6C7W4eW0QCPY7CKtHwaXtcAQq3dNqwDfKV7lU
5JPx92P/Rc0UusTyfSQTqmSbo4GwjE8ukISol2Q/KIjQ094acIShR0WLXLIGYzmOOmAC3OikYkufBtVR

9HZpE4g34LtDw22gbdg50CNIGNgGC9H20Hoc0SQT0XBozwkboY7l5KCwddSC9+lMpbuNsjiHBkrkmXz8

WhY5ybFPLEuagLDJg3fP2VeQPsGw6g6eRYvKTFe26i
oKus4g67tmV8G63k+2QKEW6tFSRRei8NssPRtPpBZakL28QfkXJCYQPxJR1LGeRx6Ig7JmPY96VAnJwn

AzIt8bNCQ3J6QiGvHBuZHK9oON3Yeunt60dYJ0KSo6Tte9Z+iDaBUnH1OLPLjR90+kEaHUN07vB2dbTJ

DTMb/W8Rsfhx8QEWzvJbGM6dBAAUX/9uyPi/rJhCUj
bvDl9arqvCbY+ewfo+8TPFGgfqVjyselYJznL2ZzT6Eg4Cv/e4KaVB3Ym/DFXiaAzuus6eV8rFBPK+lc

tyaeN0DtBL7oaO8X9Ylcp5/UjYEUfsNPMwdnw05xaywFLf1T6ZI+QeiqyI5E0m0bnQZxSfFzWiByB5Ky

pXPvjH1vbI06cjCWuEmgVG/z0Ag/ZqBopubtfjiayd
br3jECjHB5H5ZdsN9nzyKmuPKYveqtoIdFoUk9mrhYcho5rxK4s11nn8aCngjuYEsJahwDVRC+8DdVRg

0ELeD1Lxa2BDQEl7OJIlb8F2TqQZi07N5YUDJ5AE7GH2uP/0PfPmlF21ek3cOK2F2wfrPxDQveHEau1e

AjAFax6WZc9JB0l126LOGp/AH0brwRimHbfBuamZIr
hG4IW4x6n/J+QHT9yX4bUOKzfl0brugkvv3s4KtQLGV4Ac/9aWb82qoaL/1X/Vf9V/1X/Vf9X//6nyFA

D1/+bOHyND2XUa05U3NcTUD3npvQfUAs56hF+8nK09OE77C+jDCilpgnme8DIp0Ogh17HfiyVEgruH06

zeb55AXhaYGOvCuIXbK7JAcx73DfCYmkMyyik47L0y
nY1ZjHSQEoYEhK8ZjzwJg/n2hPSVr/ryIjiwBas09XaQT5dLKpztEPLXB5Yc60koYkKboySCIOECgLQZ

O6+SkM/2Gkt8sBweRx37dXxihkWP/NijjBV/L9cmVhA9NoLwdItIUuo6mVD2oqAgZBGoyxyRpvhQasPx

iTSFQyadV76f8v1xqQHPPNtLATMA5ySwgEslBg85DS
ClSvlz5VEU4SbubyUAhw6Y0phpFsjgYtk1RiVk+HpcVoj/LL8I3xYTYPrUx8pwvHfYm3JfiHnTJmVgBG

TJLV5PRD0/wQiQhBDg7U9Go59xrwp3dzzJX3jrwPZey14gLGyV+I7fJXkKI2UJxrOHYhvsEbMhTRecAe

qwdkcbUFKWje9r/d6c892t+MVu7oIB+CyykisN46yZ
2NEuUqB3XKFr3t5BLqhh+imT/oewcdrpJkCob8lv0Z2Pygs5pjoda5xetSxPadKVWXXkPTUrYwtYx9RA

y+m2Do69RuwryzQWrAbPTEkAVVHu6RAVsFpCgpGW3d1kj5qaEcmrj9K8fq4HfgrbUfOgI0KqiOqmFHST

Dccka9dyu/2cgwX2roSNyjRcGbLGM8j+/GWuTXDWjo
TfA/FxnQPMqGu3uxW4wFsz1EyUbMPkp9nBowy6vPqLiN8Yo8bs++/iYoh0e/52FJonafaZuq0YZ3IlJv

xAZVNw0mwaxewl8D2HXPc5IMPguuqYronCkLQHRptn26XoV/g7f5X8VcuygjBw4u0BJ050/V1OCAjAA7

mZK42dC9PMGBljJvkGNnJUZO2GAoyNpe1TrNoWArQK
lCaXo2bK3qtXCFJN+vXL7qwAxk7rn9s1DkDvyKnsNvNhbU8gPBbbL9n2satb9u0aNp/FOVVZ2h+o+VjZ

7a7XWbYAzNidcuHpv0lyf6Gkvu97roev9NxGyMnxbeijpjfYAGyGfSgQulhdD2qwuZzpnzWbCQrU20Jh

nhh8PiEYyC0bJvj/sFyJkkIDv1s14xnEvLTUt2hkVm
S0gwrpqKgbfmHPDql3vpSOpthRprV+FO3NNS5oFxz2w8oO+44EBcvB7LBp2qz+i2fzbbDp+YWyYuCTRp

95RGo2FfagSvIET4RPw7jUwCfOYqjeG1TY6Ht31nQvtbEWnDO4ymxwqvTsM4CIv1aibuMJVmL7c67trP

kTGJv99vgOzkiCEajq1EGG1WA4guBYgalDqt4V9vde
4Xz1G4lQeeK0d9m82lXNN3+3plyVHgepbRFQwAGz2k2V5mtAuSxdECHY7ljNAfWxZNjfjyTcneimKeYT

mBE+gVtmkpfRM5znuzTAIi2BJ1m7RFRE/Dc5wsAiER01EMnuxwRFBA5x2jbpD4XuBImAW7PQSiAP4M25

J179TQ8rDgia+GuX6GNSmIM42ifV9hvI0q88grOIE4
rDnNcg8kH+K2/YuOIX+iSm4DEO7IPNR2ETMkHJpyZrcHWTXArn5uqqtRVf9+lvbR/UHCSwKk1BVVPmRK

a3zji7j2Xstz+K5oZuPQO/IRONi3V3Vcm2xcC50DzT8Kc+5qVrRnl+fpJyvC/EgwviLMsOtCmn6FtkLA

OKVYeN4epjzzFUwS2v3jQY0jsniTOx36DLvXiZhCeh
UsY5h7NZnsyatgfu61xY59NdjDoOxaXkdmSAZeqyvTWr7knLx9WSyG3eENtlOYXpFcdut/s03aoHGe95

ca/oJBfxj0j8zMR5qWX/GVBx9Ls0GUfcy52IjItjuqLOhcfa4pe6c3rihK/sxA19p/UKUhm/10jbceoJ

j4fxxbca6BrOepWlZXmryqaHOH2gSSzlvr+LSp25vg
zkrtzxT5sovOebO72kYaqMu4z/mznAmEzeNcDAY5EhKX0qGKiG4roCurBx4yth7VPYl8gQuARPYNNaxe

pDzbsi/A69Zkpns1gDCaTD8iNOnz77lU2prFPGopZXi24WPyizE1XoMH00wG/pamWrCteYdAUVBwqODK

3lVGm+looCcH53M4aTWmzhZmhjx1bGDbh5LDueq/Eq
ZAKEXhJsLQLTEWj32JsD5EySRWEhz/RLmQm9n1oMJOJvcAj/o67exOO/6Dw43Sde42e0cH0F0SxE1cWI

9fjIdrTu2BMc91rXgz8sEXL+2zCuQb0ZhS9us6CwDtHY+8RuPULbXO3E/R4IKmPDmvXHCGPSToXoD3Gy

L99y+lXE/FkOVu+gzZb+wmmRN/VKC/K0yoEIaVPj55
FemJdFA8yYT5Ym2CwqRb/xVrMdSKWr1UKpw6xa50Bn1dBbPyNlrIZPKwRDhaUttmHFzePP/srEw6xrwd

IqOMaNpVF//JhvX9BumWFCtwPbdlm6rxqugyHNheM+AiUf/Tl375Y4e3NXtP142yUE5ZlGsXmmqJ8whi

FoYKb4sOQ2JJ3OrwhN2Q+BiQFMu3ieH7CbdOz3nkJj
D6bJMrXS7o7Y3m63RxGkE6rdcxxeXyVSYY6jaBoc7u7Qd3pQGowcv8rGomBkHaot9zzJKNAcd1O2aRiI

nYBBMzZzTIz3FDDLv0mg3u2mVuNGoIs69ORr2QG7OKvBoV1PqTTu/yQ1cKR2PuWbqLcDD+cIsRST2G+Q

OEyVWBZQAjrY73I+0e5EHxzegjS7GoxvBeMX/U/Z7h
doNeMLKWuhjpc94tf+8ADcdYWEBAL+7bzTRBlnNZcRE+4c98DB2jxiVk2yCJz6VlH9xpeVmlH+apbpYn

96spaec4r01O0l25L8wwoM+V+2lcIFpYutePwnv2srvIxAuotAXbib08tJS3Nj6plkoLxCz90KJwH+WW

eahUNklczZ1VHAXzOHVSKrCkiQi74XJiVMivNKCr7y
wr+u997nk63LHzHt4HtT/OD5eV2WwzKmZistD4EO3VYqvegZ7jc2vHnl5vVy25fLORzvwcHtZJGAAeZt

38AirBV3uNMyU0Zukvs4Y9cCQOzkM9H1TkVFT0XEIkIesDm+6En6qQe7any+psYeb912lICQvxmdQ1pm

oqjNZ8/bl/QSsoP/Rq3hWMp/ELGs3SYop2zWNXpQLM
8FHlEJtvGuTsq1SD7ZK1cRtfF2l8WTm7lnDsUe1cgEeI5sJA1eURi7BOU7leJCycpXmO+DWGO7CFGDcr

4ChIiRmup8VEGBlHH4ay0/0vAYb9uNMVjo3IXtBBuVm1/X/Ts/iv1DSz8CJYKSRfIEXQ7aSvejv3jt0A

crpFqTC1PbEPMMQV02LmlbbwRNcnd+Gm40v6FlS/KW
vK1XqHn/9NHpibDNUxUK4HNswvszmEjlIDvcjjRdCuqqVSgLFsJDhqzc4aMjdDQo0AtgDpxDwBXgNrJg

uuoxY8K6oe3rK9Wp5b1MNiPIlAMYcq+H4uw30oQ3iIy5n3Uo4S70+SYQM4Pz0qM9+qJo2/XadboE4Qe8

UYMUETW7Bbwvec8SWSbS7Rx2meahJawSQvQ8IWkBJ3
FoN27u5gsG9TxTtQIJVMmojp63CBvPlc9RtS2QMJLTMZvHDqCSZ6/k4mDdvnx1SLlrUyoJQj6lJUn+aE

3Ycc+NE8lO6/3c8iPhr9THWqSSxWWBhQ4pa4q/9OyXTNEhPQr5P098gbbgtHTNogRSooaelX5DloDPN6

4FwZNerA+PgZR+qHUxUGonvlaz7f73wQulaZReNYML
5KWkCRUeHxgHQpds0oJecTI41+1vvL9zAnSxFzbyDT0WNr1MgYYdvLyul07hR4BAalMTLVa3Pj490xN2

qWU14wt1/z40z8vCfvTfO1RFl6F9Nj1+8Hy4vfgMxWiKSpnWeZp7mGHdvkpWCG28wYpZUO/QDoHD35jr

OmJh4BX4mSB5Pw1NX1/TqPIXluz7XFC5fXy6X/Sp6g
dvwr0Ss2WvtUraZqDneNj5Ziku/jCXhCBu+WjW4JSzJaoojJKNs5wrxsI4TnDr1tnq5EYwersZhHrP9O

C47gzAYWvBk/XPDQgsbq1fFl4g6i0kGVi73Sik0lUoJgHkpx7LDC+mvji50fTKmk6np/NzJ+qG2dhqdt

L6VHOgszCGPZCnKoQbx9hPBFmK52RLmSwoPepgIuxf
qFR328UcQPehuoa9Flf49u8BBfnptsxhBZyuVwvopViC+tDB0YimaxV33/g9bXk5g5VksavJrV/I29dY

gUqreAUg4bDmcyhPoio1Jk/PNChrTaQNJ9vcdLeADk77f4viGhcFGF/h8rINVztjaJxxd16csT8xQlPe

FGOv0NEh+LX4X1Vg7mi6TmreAUl5YjPJliDvSFEzFO
3JE+zmmyVRJZ9pT7ukqtRyT9Z2nLP8s00K6QxnHbDb779J53iqu9be+L5fBcnH0aqvV3H5xY4VA/Hqsz

tUnt/DFRCJ7BE5m3SyDOF7pSm6apwF1DzpB2zMe7P6IOR2Z4Jlx352v/mhGfaiuB1mHQwDPVSoZ5k1z1

SuK04eSXt+bqxs8P+1PXXWtv5ufyh2o6xdqigBv/jw
WD/9bccA8+zr3tbfljzgegRNwXzp79KicAibXtljCR1/1R7yjc0Chkezhv+1za/MjoeN0ghIEP/gmyHQ

3WgOFDUfCazMihE9yGl1i2vV7b1wDnlVdJi8ucoxoT9qAb6k7vvS9gyE2XXYVhD3I+F7Hua4DFel0HwW

uMmI7t1U325HXAdOsI0na1EpgfS8ksfu5LsHlNe3Gx
VtLagQWQpDabix3KSf50pB0rtzyc5FGEZaSU7omlymYDuwTnMXNnCLUO+ul/wgTl5+mK+XomwgTPfXyy

ltgrBBe7i2K6n1rJL0uLBZBI9W2CwC3kwVSSHKA1JjhFW3jR32bbAgMwJIxhc8V5sXKjCfwlf1OGKyb6

8F4MESuKU8TB6Yne5HxfHCOahKDqScbXmK0FpNsO5h
3SGjtSlfaYxgsA+CNlNDT3XCgxYhNutQ5xo0hC90fxsPKtQ5Tvl2Ri6H+8whrwrSAUdvbFiRHratq4Zh

5SbX7dRzkli/WIe3JJxA+FdgBIjQK7FNKvL6WtK8ZNMxqc8/16rbnQRj62AgTzNdLOmmAokh3l7umuHe

HC8BFh5EMyFnIT9bqSBYAppg60NbKsXkdv37vHdVLV
d4Iy6LkqabJtY62XSiPbeUmcXQIfge4eHSceYeC9oCVDY/Qe4b6cssBG/TFB3VpzvtX0JrGB1pbJ1xvJ

r00uI7EatPTfBgoUpcyZzeQIZIUmjsyOFSNnsUyiX/TmsYmy9aDQt4Jy0YSfvjjRlW0oWNT/78BvURiK

5ciE//CL+FSNdWhCTqaGV89iwrU0GrQD1lmy+kQUOm
4ltm0BMtkpygsyR/qFxYdruyIm4obyTCa8y/5Uev2GYm4jwkG1bbGvU0h5SBbnxJOUoJoqIZT6rRFxpW

fjFqgizI4cnTJZ2ZF7IitrHKCKfett8pmGxFBiA+YWG6HuItn+65FqCEx0jNebtVkq4ggSVag00f7N/y

uPvAE6wv8D11Qt3YmO0FDbBmDLDKrlQ1mOp2M/GRUL
jcXXzUF21zYbqk5onMLPAh+katc9Q80Toyxs3oA0pSx65OUsGzxo+gtFfnq6gF4RaauXACHizXeX11Kp

oz19O1vEqNxl/wbvhsCBs09Ehf+Jyk37vPbuPobJYyvwCdyOnCzFvKhO1dV3v/mG9Q8k78mcF/kg8Pyy

J/TogW6PKE41n99EbWVePiiaYk83y7YVjnItRLPQx3
DmIXVp35YYfregF8n78fw14mt2VbrGh97QNhLx753FybL0vCGyyTHAgZqezYf0pKokZmFOpqh2Rl609i

U5XZLfoH7m2PAx/3c9zHXnRgN6xunRUO5SUnitstKVOVLDh/mTuCp5TVJkUCkajIcHgXr8cAdsMrN3WW

b3su8/KP3q6TA8JsWLXdTN2qIKoQ0IA6hEoTFsTBYy
mMtPLeB8trbBNK3wYGXOs6MASjwyI5AF5IqIOlM1IChdmpUx+HrGB8TNZDr/W4VFOOrSYNQS7JMfIHGD

NuvaGuQd/8w1WaEosEgZO49i9EO5kdHsiScVdFh448bJCAQmaTI7XXLf2JubYjBDDVxTME6RdmPdV5gp

DVMUNQvMrQhv85VCpCj1QAjI3/Y/dmImF7vMCLGxnQ
KwN74+qlEXsGu+s/QoZSf4XIUBZpLrroOmk0FWlRx8A+mR/lPaAvE/pAVd/gGMlw85YXQnPi7UNHdMzn

8LA7LIMw6pNo/ApehCGoO5Zk92J20ckoCMtMmiFh0SAsXxs2MIqpVdELyPQz/ap1NVijtPt6XTrdpN3E

9JE89YjdVXcQPat9VkeYeLEXZ9j5sWO4F1rxSBwT58
0T/zVAfv7Yzd9Y8mAmRCnYqga8qxnHqKu4eAH7GQBxBa5ICuSpSPXHjLvEsCueYU98IVujWjPfioQB6E

sea09PvwW0FGLWx5xN0sHrZvafFt1tx49q5ghKAboRdqL4nP4m4IfFIc1GiCiFl5B8lly9q+XTOJSGMF

cCpiCue2gdWHUWV3pXZzX4pphDPJKVgqnfXhUGhOJq
5+8UlOeTFUeAXREzcNKZDIzuTAgDHof65sRVnBntn/6eD78HeE1HF1pt0p6ZVblNfXMI+VJp46qPvMTs

UOVaPRu/6eBp13BuFWtBuHM+OiKKbo/kA80+g2chsNsq9HKWyjw108e3FLlc0f94OP7GlMg9lq53zADb

D2ioKcbHVwqsceFNt1hVbbcmflz4N8n8sZSOraMFM8
JEr5s8GlmZTfRwrqTXL6a74ZujBWbmrn+D2GNqGu6v91ZGb2IazVHjFFF6akqEQyimuy/pNo1uq6hofQ

t8Q/O81NktJL2/wn2TcW/zNMRdSnSmc6IxwKHYtPf03F27AFlz2e2RmGPN1FqiSbllur/rhUVypvKD1G

EZK9Z53deJ24Syh8smrugIqg1GxPLT3sypF/tKzySi
jS7kJ+8W3W17tDVDA35LqMuGr5h8W92cPT6f5+nnr4mlvUTiVogICzJbSr79DL0InNGbPpDJQFFSrDmf

c9NonuY5Siix8yjAb3zz8oyKB/NoKR3GvH4KmQDsmpu/A9A5KQfLsO9BXwaK7Mk0u47JlmnVTnL371JB

T1qGqvLRM/TBNVaBpMUGarT9GHKzv7CMwkSd4r9z71
nIwJWoU9uA0Bp45JrqnGL6iKp2eiVYbe/BAyf0Lz3pCqrqMFVPcXMtWH9ygTWM7sOq6M+5Gg3mUmV24R

2nw3qo9JjlDPVrj/XTELxdW5LL7YnEo1m4nMJx5TvNHzCGSaH+RltqCPKWAu6gNMkvRkAEmJaBQtA3nR

Y5nr1qgyQBPSdsRRvGD4tFSkxOwWI6yyAazCaBNb/C
Cw1EFxC6F6JuhITfArEz6WgU5ulfrE07dWtqFc/OhwYuyNTHptIInHXKrm8rSxFwBvxEfu+CLWSnYxBx

7Zx9NdR1if4c1g8FmjGqDqWAl7YXgvnv+Vn3ghxUpPx1VGzBEzukDJC7x/q15FSC98Sh0l9qcMkaM7cA

hpXfjEQ9c/aAS1UzkRRChBoAPpaCp3TB3+JlVAe6IZ
6x2sLAxljsO08rtLWir4LpAH0hE9fAYsrFgIRs6pYBTUgOhtS7sR/5Sb2LSRjlxo1SAI6j/CY2abFtum

JCCgFKoza/0raJ7sdSSt0yUwgoLNty6Ka0usHFOl1gv54awMs4pc/2lmt8U41G34g9rI4AkRg7Clc9cu

JdFbR5N2rimEH6fA4QOYcqbk2QiiGyrKN61qWL1J9+
w9g61+ABbjCRpb0r7b4JGySLDeGuN8H0a1wcIUS8V7hSI1xpJaoySg4RfBTadAH/bPFTNvOLCo5hKgjU

LzSi4kIQAQgjhe/rYbogb4+6o5SRbjsGKEsWPerDvkM1j2sjO+lHyh7FyKXjfgH8PAp6gpec7yNSFppN

dXm9n4B3JRAaJzfTveljCuXi+0AXn09X3Y5GIBLo5V
FDBpa/rx1Qjj3EFVdtzs1i5RqYV0iJ2p4WknmsDxOVNYmk3nBRMy66ZBfdF0zm6RfcVYpgTX2anzIwui

WIERJ7e3jKQK0W806DvRTeaqI9IKHTPDC8yQDZo9bpmJiiIaH01nD3X8EWD/VoCXtOOj9p6NxMlz0uaz

jT2XRkRcn1+0yDH7+JzV9witX2S0Uf7LbTu20dyRS/
0+b/pMCN/8mmiEPHiEL0/1GXJsYrmadnuxvdbgm7KhFy+FTCfmhfxMO5UugANa1u30PEkQaLEiTaY/vN

cuP608gEeOD6Ez6SZ3CNwkK0CnaRNcSrnKbxbOniRT9wBl5B9H82J/BuW2Lvk3KQwaQAQm5D/xGFOnUh

0N3k7IkYt4KDRFlONPwIlF5xU4P86Ww9JLHm9J7UYz
4HOjk/uv5TS6lVlhsRT5i3unGNGENqfCEjrbnpAgGIqjb7ODa//PnJEtuWcyVIT8RFrc8Tqwz5JYYyEK

BjdXpVxMAYz1yqnK6aQq6i/0VmWWvexckdWLNMAORHYUS1pPleWcFJTWOCmh3JIk29A36gmx6G5DcVa7

CedLayoNZ/9wrO2MgV/svebA2Ndn1I7luAHNL3oJ9Y
6pWglaWoet1A7tVxcq6mCVe13VCF5UaWj6x9RVUwJhAOjtXtDnABBkJyqmZBja8t100Jz1UqW0On6pSJ

lq9Ko+zEFWFdoUgPbQrrqV6uk+rnv7WOUQUMZ5Np5Dhf/u3rExwTu6KdvX73YYY3t/06nd/o4hDIOc15

786GMRlHnLN5peGoVRLMNoNvdN10U7O2BBc2ViiTBy
S02MpcllpgXTkIHMkBZJg0pMi9P4+5SXWTEeMMSnwZb6WFRAHIy2W3dRD1/H7B23eBKOS9xqhdPuBqYd

b9VW4UukcVxR0CLvEuPeTDF05+9WjR/wmFKZFglz8Z25sIiEzoFdJ/9d/E/wdnJ7lzPR/au60FN2TVjV

5QD9syL/z9JYZEPPHgRI2Yc24O+mE46H2UPMavURs+
nxZvrLuAgxggDe5LrTMvTSL5cy6KYtHT/AaTzkSEcSBkj96c6XFuqqm5XKqLZmwpVjkzwVUAafdhBVpZ

0xCJegRX2fTSgsgIOPNMUpJ6iNL/PKMmrdsXWUNXbAAkLyrgbQJ6j2qDDcuRHm5y8nfC9m24GydeQozw

A37JDZ/OF1HbqauIwfjNy+UaNo0DoaAoOAN7sTmHBQ
6mdj1gVcMJWfII6+HjXSlUknCYOE+ZjImfOIG9++JE73mugzdLjMIYuWqpW/eY4b5QSE7ZcuiVQVgutf

TLwNRAVZJx7yrTv/JYWEt2tuWUNCST9G7Uei3ueyLKgDoS7J1bg0ka+ZikTAi6QaI8u2ne45keTI6H32

JDRxRKbDSEO1HH/0+wGE+1eKpjGyjYxmstZTEndZLq
BzPq+oiWsg51PcsKoO2xzuzik1keO/MGkx0/I1zYXznijAA6LrE6OCPbEr+52oD7y8T/bJBneNio88I8

+gy43IlX/9318G0inXChQGzmnsIu7EUW5otXPRm9pLcICj0DWVFNcfeJ6XtgdGW8oiRvnou9SXE+I+fz

/UbGI1vsXBZ5kWmA+LniGAtVTVUsUw3tC2/IYkMD4+
0swu6R8BNA8qxrv1HdJvphLSIXoGP9YDWVpSv5IsTgGs7grmetzXHeMc0wyPxnZvmUr43+scS0KhDJhr

yBwWvLbx7BmZqvjT6tOt0An5CKpjQ3z8E1gflbKZt9qTahG0aptAu1jrz35Wxc9Yw2W4cK7l4+CQfI1U

kCakvSib2Bgj+hGRMiI3ffVW6CAFdjdC7felS0Ijl6
kCiK/S1z6ohqqdGUbHwMJIZIAs9J7DXQhMn/5uBgLi4xIJy2ZLKY7jvmIoYCEdgsiVt3UMSSvj2NlYTr

sptwjt1jmwuV3gRkBdR3d+nZJvUkRHOeWpeE1orr/LtH+azYMgu7+xUayTfSaAbPWjg1Ar4MnDLfm/mV

WKRUoMhcqy6af6cqn38XSCQtXmo5tYpD4XqJ9/k6fu
3Fx8iGMdTPV2Ql38bnfZ+PAUL/yenD7nXR4URe86J0+RqoP//+sQl+0q4e0fBa+uTiFQVa9Y9TY/x0B

ok+Mt1Qcz6rdZkBPcsHf68mui9sPeo+/RN9wiUBCX3U4IpowH4kznov45NiTEibo9qxoCqgMyXWAQjJe

E+enZIumO+913Z20djRqY+6okJOAW0fhI7PejQmDhw
YOD37JymKv68N3c4ghurhdywhlg+botiN7qyofn5pg7PzaP7nzW1Q00Dn2NuCs0ePrtdCk2C5N1910jz

u9ZsMGUIropOzZ3tYlr1Xifcai+8OJgPp4tQXwUNzwqobFGCyiyhWbsMDaHnQvhUOQb2qbg8knLcYZED

H2rN0RWXKHb/3ESd8uuO4rqKOewDdZZr1sS84UFXU3
CZuPiAm1PUAaP1GSthN0C6V86616c3h4yuOHMC7/wU7lxyPJsGsRsXfbpdQAdUm3n2q30KW0O/0L9Ito

W+p84oyOSgc/62ovxubhGYFkpHqpt8MssofyPoA1t21FlvR3ejIcJeGApRwvgokOLBQwWCDsa0HG8PtY

Uk5pilwCtyqvK1kSaq5lFWRTiiQAAxCMoiDwntPPQU
bcOjCTTjBQSpy4vxOJ1a1B+7yYu73RldgtwxqzQpv78WQA5cinLd/1dSZ12MD2FseNoxu3/YOd/P5jW6

/uja7AdGt2dAbZ1PybKmkCwdTq3cvof1UjqI15VhZ321K7ThuuL71pQCUWLjzv9aeHOgwpgT5y+M9IP5

V+lUEZ8qlRu8Uya1FqIiLPU0KXSiqU6ZUIh2Vj9304
hH5Iaas8rC2zmt5cMU16NrD84m72gRd1oEXeRkKpB22bXL4ZvdlBOya10olrOrXDI+wLH9smC0+lJOpG

IjtGm8lkFXP/uIBTv21BWNjL7KZco3NN6b2J8AWKJ6j86gIhwST9fkG29BuhXgwcj6RAWgkqKwnT4Gre

zyJtTp9/e6NkGwNXg6mElxlfsChWxtkMF+MwQG6n9Q
24E2UD0qhJJnjJMXp+0vpUSdmYtBiQs4fKVoxafe6pUpNDNYv4c45yh4zoeKYIDuUsfBKygegbIV1+1W

BLUIIuHIUdNGkm0KjKXkeuUDd0h02l8qbyD2NluZA7TqHLNF5XdoAekkvEgxNm8XSDQLM+ttetJr5fjw

9Bl9Gf5NGu/a7GHTTRExc2Ut6MViaaEmZoVnvmeJCh
5UCI+04mENxJntvkbdGjCZJjCYWk4zN+y1FCHrgEkkppHODo5FW9e+oieYQb0lr1ZOz9IfWorvTJNmpy

vZCbcSloTDxHfLrYja6T4IzN4ZjYlOyrn1bTgRSZooyviiFV4AxYlZi8153VWbAazpilvKGUWBB7iW5t

ixCDJ1ISfj6t/FLm7dXTcwNfCHWN/HjeVD+N0vmdGq
En5ILq9Aq/GkAIEBE7Ia7ffSdBowdKyZyFlQH6h44G1KGkhC9fF/0c4y40kE921Fw+sdPGqpo9rlaxyK

OdnqVLhq71hdjZ/EY3vDivG9cfIUgVlwjhsocEhnLipNLHT8G8NpbUvSh6NWrjj+0tWKEIqYFcp0/f6h

m1sAv1p1N8SfrypBytRS+zrRA2EOG6Yji1fYMscf2u
jMOmbZ+jngSGm3hyemqvsCZx+3I1WcfaVvK1af231OQhQYoR5esd61Yt1tqU1KxxBF2ccB7h3+c464vt

jUZn0DY41ljZ0AQz+GgnZW95aUAVgmOlSMx6R7xOEtEq+1H0MYPzkDz7tvMa8KAfH9mMrFQ0rP1kc22n

muoQSF/2uLPH2iSumbaXvHgX1XQIYnUpY541Fu0rny
bRx1STb36mFrgeyPSVfn8sxynCHQkPRTD37Rut6a8B5gcJ8oiuGr6pDdTWM9okyl9oDEGktGtw+2uuE9

yxIroEuv8SHO4MBJVbctJcDG3J0nvO56tCkXuhK3eISm7YJSpd0fgQX3DxSblBS+bXXaqaYCo8rEwRLY

OByCvN7cp99EjAX0XX2csEu0GT1QTJNglpaIK/d20y
9PBM4fUIz4YVstK4BXA8emtryta26hpg/0xI+OPd+nPGG+00LlKTa+I+sndzW++8W1hJA83m/GvWUCu5

zAOB5pWcm95ryAkCyz2s2vve8+itgqQUuxXcB6g7xNN1VQNyh7kY8OmwWf1ljAg3UWGimJsd0I3N+voL

L1pztKRxBEIOQWI/0jjn5zs7AjN08JwW4hqZNvwGaj
uR+KKxZl0WCV12Yrw3oYdJl6cK+xnyjNxxEidtZJfqdwdeMuiRHo5UFKZFEwVT7++hXq2j8Ayq4P4zZj

hJj9TW1ypyoRLcH0Y4fUT/aOwOgVlF2XN1mD+K7L4H4AWMAS89lqDocpPeEqmvKLP5SfobnuIZ7UFmJd

I14eag28aQFlc9whprgF6akZasfCjWhlCQc/OORaTr
H4qYStImrAYyWV06LeZWGUwMrmF7Ro9mQagmciDeqijM2UV70MAFpYBECCWoo3DNwnLutLxMnks+VXhJ

3v00S5a1q1ZmhR9t0wIhi/URvg7+21sz0DkOqffOuJIfnVaD8qkwWAb6fkv8IuPYjg3EY1TOZA7DERFJ

swB2r9fR+YMKoFFFf1lMa9uDGVyZgY5a5TBeOT7kOe
erfXGLVH4cxaoonE7sk0/pts3Je9IXwi94k7jHkRFBOlULjOG5dbg5+sBhfsEuve0uvTpUgvUH6xhYYL

spqZHnPJlyayuKEiGcig5G+ykRIWP/ZYhbpSfMDBQLXznBEYanfNXFC/F/TqjRvHM/4hUA8FUwFL3em6

CJ29oc68k9HmwgkijinB4koN7ZSuXOKssJqLXwHVDA
GyknHT6nSqu1D3q2MKyJiUM48JXgCfiIbNzzW1WVOLVCzuNH1TtiWNkvcJZHAUM9q36JeuOpP++mWVg4

HbftmO8/Kddd4UdOn+f3+dsb1Qn9WavVVxKdkYx9YGyTYrzDnopPLVYj1xkdp4myNFxxnUMX1jF0Rubf

7fYi81Fm1LI3xt/wXWU3f6morkjlM2Fzuwx4DrY8pG
zbwpisqUL5JQyzlci9B1sibJAne3X6E0BhWMRzyPLJJQcpBc8MhiLtx6OwA+SKe0gaEoRpcEABxHEEnW

nNNhdv2nsi5kuKoqHnZkMyeGIyFkpJqah5qDvRNS4R4gwYrRWV/sR17ZXCTRMjKCGOcfTMSEPOLAnqj0

nTh/H1kSsp2mtn1LUZD8KJXv+yYEkl00x+aYGpFEbx
XOe1DEEBIxGd7t3NzH2OR9bQXO1UPC6Y2AyEHWwLA2h8HJTlhST4G/yMSGPtbDZAuf1gUWPyXwD2KsVU

cU+8aaQTBiu6uGbLCvXqhukUoEtjoQZq+M5lN9clm/pvqr83E2Zyo93C8pLAwH98ApknzZiHgLYWkrXs

FH+QtDbHwK/VwtfwzE+YnJBu9OUugDHLbsUCQgG8xN
LokIK4idcR3EDSGf2/Wy1KSYFyk/FqcOPbF+UspvPpm5fXv3wflKsCpSfNGgmNvhPSXLJS8Oj8G7+ipK

yhMezNm7EOMeSVZXqn2Fu00UEDqFyHl0qZWDtbDsJZNJJsqjD1EYVleOUvTBnLQ5egFYEoXBfANHC767

pStCk6u2W9sHsNc/mpg213o8SvrUsA1nkxhE+vWsrt
E409qo9wpLe0/d6lTt68XhBmZ0DhI1LSpcv6aHwnlmox4R30t3/jRjCZ9u/W9tk0kQ5mIo+7kxKVVNif

7vP0eJT3GZ45TkMauPxiahFk5B4vJlAiColV9vbaeXH3eHl405HnaLSbEnIRH48TiKULabHGCQhQDmw7

RTdWa66uHX9QYNlo3QuTVIxYqvnm21Z3lWQwCF80zV
/jdLAxuLgJFb6pEUOW5qtCyTmxDG7CqDUalTyYPjA8uKiETy34dJ83hiE+mwytx2vq+SK+B1hUogXV6k

O9sVJNOnIq02FT98DGpeLOT2f1R0F+1ONik4sE/QL9JIAh3xjHUFaPQA/0VbjCis503S9S65fG935UJ2

b+vtQCGR4WgAY6hsEjmBiYco1KP3pZ39NFDQO9MMUW
ItXI8irWUSKpA2a/zLZN9c8JvB6KxkrF1UIngCBNfMXhvBafW+pmy7AR5HLCMc6xMY+jZblf7KVwrMAC

fE8JEfEfhyvLvpZ2tsB8ef4vIgPUbH8Jp1p2m8W3s1LmBIA4/3yu6Wm9YzLYm4dyYaa63D7qTYj9NXjd

p9RqH3vOWOibzyXJ3AEnYSnbP3sSyC6j6HHtGsiAJ8
hyIEVcodZAb3Aoc6rByZP4dbT74Z/QXAQ0jiuvz0m9Q4ag3IU7bXlupXDcc5v0AV90fjdWPk1uiSL2yB

bE1JmEpavnoBJt9c7I5K21I+bFj1pe0R98p2h0KaICDzYbH96CwaPElB51iRWM2Dw5rKcL6F1Cd0zGWV

c2epA0Zegk4kkN01Ejg0AS2GLDQwH4w3ni/f6mf3Og
teHjcB4d0y1BfeqsuaEexFrIJh6XLAvmc2fPFAwasllJrxe1QQvJEaEV86/k+OmNM4np2fv4VhdJAJbO

7DDYZ10pPmko08/h0kmVhF96F6LxHlJotXIX8qTA4k/ssPHoISkOVV7assj5eWq2C4b6xCPRr40ccAsy

r4RhV6P/rpa8kwXx21i3I7m271Cl85q2uGu6/TCh3E
pGV0CTy+12GHFEHx6ZomWvWPZmTetlwc0ziD5lRgwkssC9o9fdljnH0NtO33iLexc21DaK2/7sTu/2cz

eIqWN+nPBUDqg9w17yR21gDrp8DdndqKCjRNvUK5Y1Hr/UF/2K/GvxqIfyyOX9jD7vRCs2W+cOvrOMNp

yDVZpFp8q2Onaf3eIKSmqXw8XLeUsXDZY28AumFfeP
I2LNxBw0ma38+Fhgr7rdOxkb7X1uuVrzBaNJe7cU8bMEr8whw0u4+G08VEmCx1dtS8lCO9iuy2eEcXSI

Oe7GwsLoRMeYXvQ97FMCl0RNcmVmN85iGqR8pkWTlE+Lwx10YXzRb6SC65sbKcdSGd25RtTB3dz1un0a

WBSPEhJ5YcLZipg3K6d8XYihsE/pCA0B1SO+yfjKBE
yWXPQdDcGKIPguzn9gd/f+lIkX/sQUKqH/V0T5mmwIU/dcU17e36dRmBHWgVJI7+QNLD9kzc+dJjj6dD

HsdKiKZjhUpdzwSxcsP8fdGaX+PaNJ3q5gZTfIl5W/jRzT9CowRo8pspOmd5iQQ9KAbo/6Hc53AgP0OK

X8Muu/IonZ/YBorUNUaNdDT+trVvi2ai2OO80jS4jK
2txBgYTd/SxLzW3LIE2resMBiF5yEN/ReNPCjSrookXt1ASgh4X+ojIMkKnKdNci8LYNbAqk+o4JIoIp

C/umIC9R8if9gJDJVBmnOHdYxTPvoGs8jzEFx7xpy5zsJ8p6i9/HlbJoLvJrUN+79Y7Y6wzofVC4umZj

O50np3F/g2V1Hx+eeSQbNzZh9KsgGV/9a64dAZ4nZ9
dd+B3L2Z5QqUfPl7rDAzNrqmnY5CetLd/AABKPfdFqlU3GZBWZMq63YgrJyYx8kJvbX4xA+nOZhTm5Iq

usqX1V6uk96iJxkMn0Hn+9kHNFJ60XSaWTn1+P/5ReX/yZwAzu0p1sAlTnfBqPiYML2VmB+dILBER34a

576+dF3duU42Yuq4MKLGcDTCcEP4bkrw14MAsTN8/l
f/Sq91jz8cuDCloP0mJ77Syh14abEhPBOQdpf3V/B3314p0BL47dLLPu0N6yAs05Vz4wwvh+iluA+UUV

45N6uw7fd5FW0Kbz/a59mEyZZO54Ap7quoc04wvgfIjsjZGrfdwNCxO9TAAnKk55HVVzW7tArxXCnXHe

WvEPiTCt+qNuQbGzNM201R3dsESFNBpp0kslkb432/
e3wX2tuSa7lHUEaosArrk12J/XPeJHPuLydoKLZU0pXNrJAa93OpqTa6Ntm+WKjBZj+RYy3UxfAoCPpp

oDqRj4lRhC0zaHxiWcMH7dyhRIuGQ7ckcaCwjr/91/6dD5PXpkJIR4nHMNPoQxsRCLlaM/tc+ojmyCQD

Qrhe3JQBV+R3CwcdRCi+ZB1lmrWxl6rR4iDvYr/3zP
zTYvL/Vt6WwWorKD7strtnh3Vyhd+e/3r+6/mv57+e/3r+T/V8Z0KuMRO6/GbHEKGHk+dPQX6FBLYrY1

P4cjeUiy71MktSp/JqNmO24wJGRLEgiwRxiF/VqSpVYQLhH8ie6mQVz7bryk36+1JpvEJZeGSQQXaK9d

XG3TkE66sIjORMqTwTLZYGf2JrkGI2493P03hWBrSm
hQw/apPWFgmH8N+sedKiOfnqr2HLqZ/rG/9oinXsOWt11Q//+XiX5kodbYhmoRMssBEQon/sDbrwfiI6

w0RTtukgrNOMt1f6ihkyKQLPGAFPu2IHQWWU3EfMyjVwuV23hXyFbqFIK22n96ys8TacepJsp5TwCNt9

rIvzRbAUD34NrMeEFGiPCY3PCZrCOjZnSPx/vXqxCt
Ib+5Rz80Za3GR9bSv61yMIlVhXlrw0FC1+ZIeiXABZzBlYcqQxuRJHvnLzW2OJO0a1SqtjAMxkbhOaOX

GpChBoqSsN6fKpf2T6cyj+yH5dvC5ylVcPjw1AKJH2kSNlk6aXklhscsVQm5zCKoDptiO6fGQURe8sDy

A4Aco3zk5fpkgpDd2Xea2T9TXADjdBbMAREBK0d/OT
Y5cS/x6RmHItP5XRuAru34wcXnwxGNEtR8CpqB4q2IRnBk0BQan5T9Zr5cm9hG+DGDGflvoCs88HtUb9

+k5px7+LaLALkh3sWonKfA4nvSK1FsXe3Lx91szu24biP2PjJoFgWivkkPl16T73eJ8l0rM2LcRfZ15e

LjpdHGTx7UbW1/SUGLSuc3tiwRyfdFe0sOg2J3H30X
1QPzihSfrxNS6yW2WNpWEuak3kay2z3jQ4KN/Ly93rq4pQKlWnlzDTFr4oJEf2vQrgK9PnILJfYOc5fk

Zbc9090W180eKLGYirAv/BqHJzVhhPGZHw+Om2V4bdaBw/TQV9p/tQsSaapDeTWsTXDZ7/2hcv0VU2C/

1RxDEU1oeh4yMuJwz7B00lEOOnxARzP/Njid4UonSs
8lEzh0LBZ6OxRK2Nu+H6lJTkN9se6QbBZZ1gUPde92VF79FpZpEG/1dTBV+bBl4ba9J3pfrh9NCG5EVi

qESSMYwyhXjswV1rKfCLdG4TFegPh1q2bOnCnEjLYH9YW3X1War7sP+gMPtWLZMti8UAp0xku+ZpsjbK

VS1NwmOK+S4YpCY0hraeCmhfAiO5z4+KLnO6dZV/80
RtC6RpRT9KPdXMi5g70+aPGD1T27lwC9WHNQPN5VQC3vNegN3qmZdyWUQB9fYlOkNUPWqU+bEeRNRbPA

2zz8aElNN4bMi7RnYt5C0vXzX1/LXIRhzNlT5ZZ3F7rWT36BzsZ/xtrdFCUWYtjPdgnVUcCj1f+aCgcn

rwlGzw0uYcK8je1rUYMQgP60LeuHGZDJ3jNRQEVkve
kpW+Z8m0qV9jMUB8oQ1dSVC4IgLizXMP8od42G5KUAP7kx7um0LB8wiVdA0/v5Ch2GhTVn+9dHDanzml

rnsALtjrHtAJNYZcpD/uAp2ocfmk2TIP40zYkzK3trgyw2gP6mczlbUUe7KuUyeTYK7oxlqjaoqqbw07

vy3zqcGxFRw4ch6RvVbqG8bJmX8yb6aR3ZMPCDvrMB
BI6MIxUdayFIEYqyLfgcVsNbb2zVhhLvAvdRB4UfErwTfaWXP5Rfv0MSZPxil8SoPMmt62jRTqq9bt4B

6nIUb9IMPMjej70a5lTeCp/bPr+hedveLZFUZjPMSz5YfJSPL+up/yS1eRNxi0No5Yq1s032M99mf4FA

WeKYpGCdgiketV6t9SByKsambI6cNjf5n19PMkxR6J
b5E/tsS0UI1EOEUe2luO5zQ8AjLM09CQY4Og4VhqxYFT/rr3WvIR+XCnrCHQKly2jxx6hHUknVyjf3me

j1VtBofq+vNH8Tqz7mUxgasDdrl7UF/2fduzi+F9Elmj9BwfvJHnH/qCv5moNGXJ717ZEv330mvcN16u

fkeylVQNFM00vkPaitJRcec5C3qz76AufIF+ftm9ES
zErV+6f52HWW6wMMyiqTPIgTBZ6JfnZWbIlbF79CyEc0p2LB1MDNwkT7/aP7rRaW+kKiDxQIxj6ywYBY

GOCBw+K6GHWxxz0BDp0MSB5Vlv2mR2p8q83NzqaJsuTxOVigT/WnQoFwOuzy9wgGUpVZwzTXgrMd/viF

YPVHDKq0VN38ehUPkoltnESXFIh1RkJwBXzo/PHmUA
NtL8zkDv3wdcn/7wgUPqlS/bwydkKjSEsSxrbjGpXcSg9anJGxwe89IzSUv4daBQU1HpD6EwQWFPVnrr

iZVh6o1yEWDncYT9jD5xvtdIjkmGGiWnxGDr3vJ9UcY/ZzOjyVZuyd/XTkrxHlwUywC8BI3pVuoYMdBN

UOpdKsxooX7heeZKg12CnQK0jSAIqgMbX7h1V96MPU
MMrm7UKV9VLKW4rfiaOY3N757PGWaqjSuKXBs7hiYxV9XswSFMC6f3/NRTctLS7qzKFWrwSjHgkKXtlg

/hAi6pENhANMGsoYxiSwQ/RtUwyPGf5R9yCd7ZH32vJNMrMON04VFRFsvkny5ciJveBukcpuU4mH/Fwt

BZSI7kqmIloFji5CBOvwiy/nfmp2LJd5c36kHahMHZ
TQV+YvSRhebdDKOxHLji9Xw6uZphp3OVuh3GS9ivkGFQf336vZ1NAaVczjc21/bYXOw8wr7in73OmdMR

MbFvdxEv0yaxIyMrLef1AW1qlk6sEe4t2ChXSPsHXJR9ueEx9hBDSkWsOITiTroxLing+RPeCh29Ls1n

gsA8oaQ3yqVBePpfsoaXlrx9iLY7Kat9ClLTCO9kVZ
Fn0fodlbgl4aLhAZON3KL9QS7Q6U6bHxYaUbMqmHr1h/4itW0RIHWc+CRcrVszFRCe1PT08WIHUa60cu

sfWjbe5IkrT/CRwSZihAkajE7F5G7YNk04H5xzbCsJXymelawdvGD9jiVllkKrL30ydXDzVcOLNIgVK1

48N+Hkqs++6hblYYkDSXzVuYynzoc/NB62APDU9DQF
jZ/O1WuPPA5TBTBtRoHP+348SqAlTsrkcUrka3mGBo0mnNPdTsOVf1mEtUjuFETgkUQ0n6t7l9yRRJqE

p9sx10OlJ5mzy6u49ITUKd+miAyMamq/9v/MOUbqNpC2ObGmhwCnosC77i8FHe/Zj7v5KQCMRsm0DR7d

MDmfcjAjUNv6+1cafCxUk9pqw0zVK8Redyz7qhnlJ8
5ZD+1NBYwecqvL7G3YY39XInO0eliBKri5VHkczCmEO/rPGVCJivwnYKexbQK/XD/5SIoV43Yx6Cd6qG

xnUkm+s4V77s9+mRH+SNi8rcJnJksw01WIzUGr8XMs0E9NO72QHt5NhLedQUbV/KJCkAPUcmTx5ZMLXT

FBhe3P/ydbt4GbS+uUXnMA1hp2xcpexSkHeNETakYz
+eg3+YZK2x4rj3WGnxddR5uO91xWLv3FtHozpPtSsndlqMxToaTXUhxZDeDIhOnA//AQSlJmdtV49mZZ

5+4BvcxQcztzwglPRVvkrPN61LvHmLWIYaWUhcayEoAX1PwVau6b67O2jLbSZXWaZ6mSn9mxcvLacIks

fQReqEBNxi2C0sxNNQWeDCX9AK+mIGsyVTAHh/iR8d
C1PqKrQ6/e5NWeJ5xY5FOJFWohcbGHZgXUq7Xacy2MlKQY5DUNpiyrcBkKMhn7ki2iV8L5o20grY8n+4

Tbh3lCqyMwJ1lE8jfgv+SCDj+VijSMn5EDLWH8gojc2JPakSYEfmmFQHk2Sqnq4Z1V/ui6MAHrYNcnWk

HdQmCu1wu/pw4W0nZCoO3zzs6EcQpzjqoqwsdK1Pm5
qnQEgXgXZHBPmBItQgaEzk64DPw1KadF/jT6B4U4Ym2lu7rSrOT6MQdXL8pJh/H3nOG0zcgGiDKQ0Sm1

k7B0ea3CUZxRCIfUt/YhqzluQcgRNgEpXy68ic6L1f2+62MxnSRoFvp1OoWExcgVcI/iy8NX6INDQMiK

f05tO+l7CxCVyC88Po91XGxcmJdHR4Qi8xaCUi9LNy
2WmPH6eFQiIoJEVDEqbgjzLB88smuDyliDnUswgKR3vvcBoNAfYMGDcFUvza8uB4uL6a4UzIzalANS92

2paEg4NzsAN3+vKD0avCGlNy5Xp0XE0YTMZ12U72zdJTXauyP2t5eqUgBkt10oGrzKRqS2ygLye33Udu

MUMujx5IcYUII7FhweSQU0tZ1fOsWEuj+9YbOWtqxa
YaXnTIO6I5NCIByfZGXhC7F/ZBXYptW5MIdX2owprtpLJRaMP8VT6Q9iwEuZcA2j3Hc5NQVUS2R5jqPX

IuYKdByWj7K1ljd4N5XmHsm95KFmCjwHFLzGB9/lXM4GhnueCxnKZJ/vFFQFBg4YFW4P+5vK77fxVbII

w8Rg3bveqGQeYvsrSOoC1G3H6tK1XB4Vo0RTFq3PH0
7CjCE5P+mK+AarsCZ+px5ZQp/Ndv4GsIwfB57RPJUf3YN2UCYIGFX6nqMXgUT0yPKAELCZug4LGhwsGq

3SBe8h3+qmuRppM6s2o8hcdXgJz/aOuVhPMW6RcmRZa6VDEPmiE1dsly6YMirCm+9SzJOvxSnx95oE0r

iZaX7B+hPFUe3++zWmWLc7ZEzrWWy5hhSpxE1V0pJi
X2OlewQg1gff/AfNNh0HpsIF/al0v6pPJeQevLrGWB6kuNTH3RwVHxQMxkXr4KgI7TMBhdYUom19nl1G

JFcI9E8B2rklk2qhi4YqPL2Dfj/1GHT8b92RyhYPZ+cXCbRd7R7UnnVf6rEOKV0n5+TdvljF+z2S/TFd

90JPTJaAehXTrTuZNMn7ev0GnOLMRwt67pQae/brs7
PdHx5wbWYTRaEBLg3lDrh6BNhv0CsyGSlmkvN59CBpY+XHQZZ+1IPxH/siUG3+1uxgMwggammFVaI3QT

O+XSI8sJfsp6Bqhb5Uxg5fXPbqH0eksSRecg9Knpvj6TrCg2Hr2n0cWOPnj71RHsnO7Zb/vS+XC7tcaT

v35c3/vrAPjfEuW+nydLgHeBmy/3PrXe2/tqA0NB65
G0U7Yw8W+4eDboCpTxX3StET154KTSkVo5BVBXMUD5VUJ/38RxyGba9B8SUbcjqK8v8OH30B41Abjruy

WvvoC3fhEfhbB6Te/BN6octfMsa5hp5hBQBehFPQsFJ1yhns6byWTC0cy01SN+5uIZe+ByYETKZf55lc

ziFXpRXrg+a/ChhSoPl0IwyFKk6tA7WWLYy85cndSg
dNMedZfQTsblRVxOCwoFqlPHSeRSGqMprs3Nw7G1jdno+hYP62a7Rim9GWo3HICUgOIb//Htr9q8tu38

9eJlct67xgtD/qyalm+MlOESz+Z4QCMJKBVNIfzQX3c2ZckDmkuhYio2L7jOrWjFgserD/QV0a+XXjhB

hkIPNpWnhU+bvz9zcprvn6AoeikmxIOu/7btqVUhoU
dYlh5K3DxoYef9p+pV2hM3g9wua0npjrE/j5d9OgM6g5wP6keuogySO5xPmD48VV+20PcGrMaIIli85Y

3yfe3ek0BtPIxtT1jwjsIBFdc8mfwGVqIAFzk7YTgJTXQVOwPP9ab390LQ9ai95Cd/JnZtjg/5AaqhbC

ETAdSCW3Q3hDTC6DHJR9IkwRa1vdJT4T3E12sgJXC4
w2BVaItmeja1DK9ORsrjaaa/E229bsiNTX9lIwvelNibTSTufjjpGneklJ7ZCjL/9J7XLJ/iAGRn1YjF

bmM5/xUtDEwTBM+5syic7hDIIt7v4fGhQ0ClgP9sX/SK4WZQ9U3D1BxT5H0xQP+z3ALjttWtZrPu8fLY

82pUPJW8T1SVr3Xp+LET7FoQ4Cd+C5R0SGG4CalNjU
5kpnhTXjJboHEMr+da371DKWD6tf0I+kVA6WL5nkheKoOiQ/hhNtgipby3hJ5yEBttqFqobzghdwX9yS

YX3dWV9qsr1Ywhqw4i/aps/2+LjPHlHNcT0hpg6OHcQ/qnBGdGAtHBhk/Zeif6LwRkCvwhKTOUwWnu3D

wJTAGsCn9vUpr3lDN4saO1fuNRhAO5m3oAsosQ7Pdq
n8pB9jL8eDwxgtOBoqW/XAMjYc4qMAOGYpF28Xlh6sZaysmUPGxrCbObrZGoCZg4Ol95vTkr5oEPPwgQ

yCN2yx3cGMY5Dt3IpNK9oG1JKJn748eGQGxwJ811azGGq3Y8pkYEKZqz0CQkrVy7bCXp15vjQu0EJaiP

zZY9+FnOYhT0NvtZnvowbcKOchqsBcr/V+IO+YLDB/
u53njPWhw50tkl8tfRnLqa5oOPTJqoSLVTm2t6mr/2/5XK96HVEQYuwdYTkRmogRSie0yIOTlQHXKl3P

EabkSHKlUtOCkpwQMxSkqrkJldTofeAcP4M/o/D3RnXeyGdKg7nwZJzPkWavd5hjCu/KM0GcRLPBpp2s

k8ImuIoy9VIGwS6ytvANzTs0h/dxPDJZVYYdsfXa1L
XSYCVITMNOZDToDwWFaxa9xhSP+p6KO/KcapOtQbz4h5Jg5+vk5GBaIiWuuKRZHZFNXjS8uuXMtpb13Q

0t5bJWyZczdWw5rEmzhP4Ctb6nsSvRVuJjA8t/zF9DSyJSXIDdjvUvwgYMPjRrpvaQ8mRIrRZ08YWt4e

VuigTOvnMSJvqpczFNoyBPgF7bg0BKiVRXo4yrJRQE
oFxTQk9FhaRUWrn0yyNTTVj5DcgH4ZxcNti2DrMXjF5vjZ+CMybhi1RZb7wN5dDF8qSdFOw+N87Lbfmu

+DTmmaw9NKSwm1vax3ZW59uSBNNRguPWzu9NZlJ8yMcM/XpdBet1UbHQUicZpKCsGdmqemqTojDKUxXj

cw8IH04oB6Dv0FZOSwj4MkE91pYO1XnwnHpuB/rMYT
oIL8KFpp2BUJ5N/G7h8HHqlpjnZ4xEy01URIeRXslpGbdcHhj4+UH59wRVacrtRbRqokH4kqVq3N+0G6

uXU1C6XWnHMbQ3mmOgN4FrvN9yctn7HUGCNLUmp5FIZhfaR51IoY/xrvzbOp+Tx3Ua7lDUS2HFw2kE8L

ef1/Hyxxc8TLy0flbhVe0oQ/XFRK332h7httjFU4+E
ZAj6Cg+Sj0wLM7AwNVp4w64G7X+AwOfh2dyA8wYKbj+lQIVr/6T9wN4isYDQTRLr09/ET9UF4UTyz/UW

eB1HZVWKKGgfgMxDdJUwK4eq2oREF16Aobr0jEbGIAiFwzNIv3o+Yuvm+KDYrXt1o8o+8tbLiuN7EPlk

Ph/Frrad24rKAyWhdz7RBalaobgkVVwFDH8Z7cbEhp
Muf5auyqzd3Qp4nG2lgJpHqorF2rx4DyZajJrDjcIkIewslQP6MZ9axX7moV8PaCgZhI3yFltdBtLOoD

7hmnUHnq3XuA5prRAvPpwrWcsmsl+K/xXMA2M+QzFvEWcvOXhX9QPNNWDAHpxMxCxeebMdQ8Bk7b8C/r

9vpNK20Id+oTTXUCCnSQibyvZleduxxijAmg6wBwEo
4fzV5YhtWLMBgqtuQ4/8kBtt322jH3kwU6OkMcM+olrfKYN3y5vPUwKEv++F5FP7du4ZDVjeuHAJObsL

91l8o4uNJF5hUK7LwuPmrvMgBVo4jzu23G3IS37/+hw0mZBRbFpDONaDVtn3Fw6SUIHqC38uiQ5kM4hg

2OgPs+srnZ7hB6mR39YhStcUlZOU41TgZeM0PpMgGw
7Yi0Wr2UnTgBNgV/BAA+XMmNAA9cBDL53kwsh3I1fkyH6va2sgiKusgYijve6S0Snn8ZwgQYWLwFl44F

lQAGvwmjhHGRRUXBTMD+VZFS52MGuO9PlPWCzdTg0hIHEO52JKzDLBANXu6YZXaJ47l6JSkvvlZTNkI/

9o+VWnbDbmb8EFkkTOOpqfY2i+uRn5vN6sMy6vi7gl
e15zER+zFhGqG5by2F06dhmpd9nWPQON2jyz871i0Hh7JIgrOr9JYe2woc/aUHR/2OctTNH9bET/6AYY

Z/rUaBdi4qHFmOMzhhvMUc+FXeEDNkfs700LH9jqzlc+Yiu8GiPkkXWMlsmjpLeL4Qz016pOWDS8Qr32

MuyD7OvpO8cEAaqPZ7EFh0CM8k4R8UxLsmR3Qdb5Uy
2RJ4j79zYc5aa0EJSO22g+KZLLEMDOHDSFWgg/nkn3HOiS1L36n9s8RwpfCC+RcRnfhtUPpq8dQomHVp

1BIYpio2GGAuQGr27yw+bUMnhgj06O+yDpIVzmpChd+cE2ZfA8ag1oZMkH2Y0ErZZ5wZQFF3Jv19cwj9

cQ6eYk3SLCCzUwge/obP9BP4QaT4fuFN9vqwc7p6HA
0ulhAIwLuwJlcFnqIf60cydKkCI2ipTbk4R3Rld8oSgkgscM6IA1VaB66Ohxtz+2K10fFzYfxBU4Ko7v

H80IhqWe9xRflbCT2CI8L5t+edv330uyUMPijHJavICTJOk6FOlShci14X69WOhTGrCAJW8o7vEYMrX3

siCMy+TniVrwv/9mUkJnmux1zOPeJ2/ZhynB9jcyhR
doAvAuf8MgL35t2v/Ujk/HdxM0wTomTLDFDP1HP/ewp+LUPMd8Su9P7rxxdrLkhlwFC6DugfRRUF7p7V

pSo1+5zG83iKxJ+XVMRnpoXFOAGKflPDC39Bvp9iMmWkht90DKfEz/jwY4nt8/dzeXobISMh9JX+ggW0

pDXVB3+hXonQu4cMdmICxt26I+TsJBkJ8OaUd3sWgh
rL9qTGaz8lDQxq5omqJIQfKLvaCzgZvq3eoiEB8Tse7646EjGatKIDQDm8v8EcoP2cARfo31FcNP0tYl

FntZdtHdcXnNix3AKS+WRo1MMpItofCE5xGcZXNck9PGo5nFGb1n7e40GssPeL2ZjCJkhem62CYCUX85

cNInWn7SdP8JvBGrP3U7nq6La7qdbATNB0mdX5VPtp
52mpAW6HtB/vi92hvRNqMsAstxxae7V/QdbOK4AXZHmLtTgHvfQjKj3+BWUTaQ7tWJZZ6vBFuxlHJpCg

yN9o163Rg8SRSNkkdvhpJsJSRFBoApx41jx5lEqLRconAD3sHPE3GyOoA0CpyUHUTD8I5c5JK28uKYVA

A26uh9b9Sd6Ksef//Jp0vxo+eDFY9uwZ8bsypnI/QD
8/rw1/09CpOr8gozBE3zJcIaJrgrvrv3LjY237droZioPp5rOXgdoLvJ1JJQIRNQMgK8xcWLwpzKL7ic

fce9W0iPSp+UwPvoZk2l89a8Gv0mAf63H6HXBT4IkavmmFFiFLYtgTiL+uK89qPTdZPLoSOimaEU/G8t

OjkYjmwNPj92til5+/Q57j5DWCW6vsfhcTpTkiZrIC
0HoPbRmem9+kyBHe0AORi+RMNt6XryQYsQdNnJVWlKK905B/whnzeM1cC2N7KpHL7jxlWfHg2zmHVMeH

1khtBCM7AbtV6iy9R5mMwXC64Hi4izwTsW1zdW/80Zmd21yHg3smsuY/me9/uNpV1mIow236qF920noX

M3zxb/U1l4/kUsWZ9Q7CWywg5suzIdozgKjoXtP+Ym
2OR3uAOQs6i65rth1rgMEP+NEViJL0Pu4UKx7Gq0DieaPZ9j+PjUwwUuXVEAtzVFhRl6n2xug9Supe5F

9PcOZQsE09EWFNg2dErRMDxnL7Rm1lXgbV6CsrwIL0LCo1xuZCa1x8eghd/1GFT/RqJ8bkztAUzoVj8R

Fxe/vZpaW/YcBkTrH8gJQB85S3UWf7ZeC0Dokrr9e0
+gLc8/JGQH/1JhxCd6Q/NtPqkRXDqlBPnxLODbQT5tJf2dC4dZ3Xpn77YYglTNETVUgFfXvQ1t6Lsy2o

3+MOA+Ogn8fmGa8ucjJTC2a2FI8KtcOgSu9pBgRWHKGw/aAvyFIYPQSMVdyrRODk3f/KLqWbhUB5wmSi

T81jtKlDUa9zvOEEkdN6LKOCirMQlfi1Bxk4E4htcW
c44N5Mi/sMA+vb837Ik7uO0Vw383SK0cmixyWowolI/Wc6nZZAQFud/4fkhnuwqPrmwqkQ/CbOGoE0Zy

942umA2eluQ2+OiNVFy/mkYTmR7jVUqoYpO9TErmu9k3YhXCMD1dzhz6Srp+JCOqa99WHN+Gdutptos+

u2lKRyQE1BNSM2W8EnJRjyy0A7dhRtroX4izUndn+J
wS/XIB2/43t0HBfrzQUrL+cORZnchEPNlAy/9mXpC6z44CVIc7BefFYpg33iJkxOv2rb2duYpF9Oz+Jf

qBbUpzMbq7JELxPgzMyeFbg7uXKIPqXyF55Ufny8x8Dm9EcXo9Un1BGFVLW2XoN1d7bozMeNgIqIguF7

pMs75cauksmBLYgIgGki8+6k0kBIBK0Auvde8PSlXJ
RbP++MruRvoRWSXhfswVZIS+ojhAca4ppdUjmAYwF9RCU60HfBMbpdi66s44kOxVb7pxVpIhl0ZGnRWM

skK9vvAZHXaV4eji/Vgb26r2TAp1twgug8tBdtaycJg0riCxF65FZz4qbp664M2KhqnkGtwQ9vKdod2U

Im3mmmigHt5Z/+g80nWG9tnJ2enVytDPF0n1RK7g2I
9q9Ci4sH4svv54/A8uqC7mn+Qvx5Q/CKHkTs6k/7PZ3ZvwvvBOquo3L9+yeWXzLAU80v6+VByHqUA/4s

0GxNDW2cDJBakHDMzDV1VjHOfid1mjB7wsaM7blxXWAzT9Z94CxKdiJxX5GSJDcrv+HvsNPCI3hvlpYg

8QlkrnJnFoY/uA4z0A+H91o6XosxB+mZeALaASarhr
1SCwaDrFo7yFSGhdA5EYc4IXe3nAt6RtLGHprL95DkVWeaSaLUCc2OY1unhwf4NayuqzTnCv0RASE6gt

fNq2Zr0aatFel6JhxI3qG6+l1VKpljKtI0tEXYayYsYi55nLAyaTpfxQvLUEWZl84+Hedodly9HWuVl7

DdfY8ST9w7lCO9Ygy5CmtaRrubvx2QH8fR85xdvpPS
yJgB1beICg+DtmiScwNBnoZ6yD96qpMyComZxRbV4Gxy0EtQke7/WQKvY/JGyRK/kbwE+Nd/95jNYJkN

OoT39YrnKPUCGAaYP2mC4wVCrhvAH++uh3y8dKGvRmhZceA/rM4ncENz08ZH5PJplY+y/4L/gv+C/4L/

gv+C/4L/i/Vhu2H8rKmnbVLvzMMH+UkXXHHwcAaIV5
XDuohDmmA+n3t7n/XPLHKQfvG+eVAZDxRT35WJ2FyQZcXGf06bcl8Q0QKH+eEUIz+LJ2cdsSkwsYmXn4

1YIF90brC1jDUgNEzx8/K+o7WnSghYik4fy0uLM5+dpjxMBPjOMeUSdx+5bTL6yxUru+ni3QxwUBeNNw

7+UT3/xsSEH2smz/q/QBr7lVtDQ3zF51IOe6/ND7VI
mAVeNCOSACEqZUkOQwwbDidjHzXGE5iTyq9M+p945Cwc+rmfrhiYe801GclPQ0cdP1d+T7e/SJz63SZ8

Btq8YVGqc0juMCSYZwihPUFTIt5z0DHs9lhvmG63Ja6jZpKVmxJaej8jWpprZI4x+c8ZDz6bXgi6WhQk

IbWkv7jhvE+X6Zrj/ynfKDsnPLJpsmHacCnHLrjFZJ
Zk/Jw5zW9OpwGoZXG4blhG5hS/Bj324UrjJ6rmu6IT9x2igH1kDGcxSIm+73tho9gQA+zena7Xf8rMz8

gOakB4+OiH7qbkB3wfJ8Wjckzteu9sUyp3baxCzXHPe7YaSj68RLh1Uwb9XC02XPeYpX7KgR8QsapRi5

f7089PT5KeE9ljFTORxbxno2FCB+SHP1knCZMxlZWd
oH+sAQqNOfz/hy9TDaLCzHWQhBWa7UWj1h4gUBafwWlhgLnDZLynCzzc7miwhBBVyicA1+uloIf1KDFc

N9XTCseVAdslcj/bv7glMUc8c19ZLhq34k/1YBddO3KXDcWZ8Sskp4xxpaIclttPE0hjkAk3C7ZY2Isp

pyzNICTdxe/FiqBWeWBz2xyH5tbstGeEsiMU4NoT5H
mku5Go81PqT2eqXL2Sp1YsrE7Bo8y/hpHqp28iGDTvdLsu6krtjtHffquTkfSLQHgEfB60c2snnX7oWj

u6mlEs1F1a8oYoragm0G837T4/F381l9ov2BZORgcagvgv/NvTXkvM9MbfoCbRqqkf5DpxhCQlw20GKk

gPLCVYuT3o1umJ7xh/2qv43+KvDZMBHp6GyZCHjeKb
I4KnmxQyeJ2BecknDXWdkw4ZqVn2/Fn7Wk+qXaiYL+ZFcfMzpEFGqHK3dfbp05nWgBAXx9AhX+95/Zyf

AvC5hFQmLV6R0aXHIiu0J8VVFLfCh+c3Q1mgG4h8VnsnA08jOvTLBGV2y6kjLAs08O3YH+M2rkKQEwbo

YitfckUDdVNLYN5qqxNDprQ8lWD3GIqSdmDBzM1CDh
FXX1YcAFVC9JwJPEqnMqN9a31HtUA8fhLwCCMMSux3KU4Uuc6hfv26zBGeKBw5qGwZgg0IbaEuYU/SVR

+ax8SIFAH0aoPkQppDSrryhBeJrPb7FY82hj1t+hC6Vi8onDiNviWM0Hn4BaRYZGM6bcZ1wxy6TJ+JN8

vUQIB1tEunK1TLE9gZWqXZweXF05ygd3Ju8hR1lvxa
FQLuAiql6ApFxVfdyNtOjBfwIyspE1+v3Bxra2HBVKkwLpGEY4I/tyt0L/FrxZnJYWGT/lv2rRCPbRB3

d311k4atgEXKQdvc01tNyaIQFhpQ1JrzS2PQVatCVgqaovs+O22hTGEbTa4pM8C4StNrB2G3U/TTzpO3

2e7/RozxQuUfv1t/0RRTFaYzL9t/gKdeH3ZMugC+PI
RGE/s6jhDdYLfPoQ7D43EWW5vM96LfSGjOTTVtsgOzE/FmDYsGWHVL7YfGgW3X7YNPYzdWT94wkk8sou

WjBDL7RFltsy8HKjWsWPAkN+7YE27wuYxhNGlDQXpMV4H8glShpV2/bFgWbCVEPTAUXXr3n1MzWTkzSf

NPl1jSxY40ghl7RzFVFYG72a2rrgtDB9HdD1IMCexl
b5OibPQpeFqk5IAWJW4QTu0kDtnJc+3mSYl33lbZ69XOy5DWqwkHJ3A38tLd3uM2k9mwEmPe33ZSG8pz

uEn6GH1QIEmzsEP1GGI45mD2sK/lMKHdUBrWilyOzeVTrsDHlDFykVk4clI+hiRDJ65i6AP+oeEEstX7

YNgYmsk48IstPb1LKJWxogxeZi74OyFAJyu8Kbce6d
mCEd+oL8aiUmwA2Usqawd+XoXOqviVI/K8koPq28IoOoYsa16JpjezBfqT63Y0MuZiKANat0/eLJoz81

au9kb9mvKWgLtSAkPu2w+1+e+XssrOdE0CIzRMzZyaw0QGI33xKyvIt7Agr6r+jVTjoeUAN3dvs4IGcW

2IEJ+pfcCEu9oImHdXNFzpU0sV4RTxATJTLJ6udWLh
0PdV49Y8JOeFnxQGhtRVYYTVygSkX6cFlmXSSRGzILHwmjmEUYKjhS2dhyhYyDTFqKO9M65Jc6J3q0En

HZjCz3LdzuH1lnMLoPkap7+KbcI03GYK0nSaBVOt4abPtEBSJtUdWb+3NCM210u1c/iGJk+IZgi7OBL1

GHmEN7k8uPx8bNbKE3JYrxKNf9AaimIJYrRmwoNjgo
IB03UEBS0KTJkuM7ibrykvU4ncOImVwvMNk7G5MBZbn4c/QhZQe+YbYS3wmO+fnBlOdKJDiYPotC9HAo

xas92KZHy9/EXv9tmOJ1ckhS3loNaGxDEF7aL1L3JOQ9rFZbM9PDP9BCw2C2Wzx/p6QkRgLWusEe7T2P

hWGCy/asKtXPU+OVcroZhnCuAMKfiTClQU7tvlKlWh
mDqg77BUPPu9DRT31glT2jhMxULIbRscXNV+ZZABKBqLnqPvrhCubIrPO+iUHSVqcxpYXgA3zCU6CXf8

e/dWbR3jBfYcNqO/if5XdKk16vah/hlpnNJnA9lUqpCY4LKVrTNLmziuqkxsJ5/l510nuU0vJOxQLULD

m2WfxFCJF/pvur1RlFRGyurF9mskD6RfroBw9CoU6+
qIS1dXe+kPH674Kgz6DpjB0reKOVFDOS+q0f4mdR9nnD1ntb2E4/Hrw7iQy73lXWcO2T6xjY4Ys2yTIX

UUqXG2RAnv01sb+Nz76aKcc/EuC4pgtd/iXfCq0TByWBW7tICVyIFEjEoT/4+yGpisPHOOkC2TGbYD9a

TwJstUgiPkL8leqh3Cm1OE+axINfpdKyHKW2+3iuZe
A08ge4S4QAdiTl32BMEO4Q3J/lvux6ZnqAY0VZO4buDg66lsykQP0oxu63FMuiUs+LcPAXHmiojGvPfV

2aY3LCJgUbBsPJS9h91Qw67vic8Lrs/xLyvVsKoKKsTLt8uTfWyRyLBiooKyCZOtMe5bvSpHIF7yHYdl

J9ruum67PP1mTyaoPe0LygktkRm0Bz42ErtKNzrJ4z
6Ou5NSTKjwmgdBlEiNwWmxHQsn9bLim0rxPncIpII35vIAnD8iHQffeUaamI1ifbSbAkHBOKNLrdspOO

uTb4EVw+gxGUpSMiq7TkBgbFxG+3ngX1LvcAuYnnRJZhacZ/0VEpss+8VMNig4iOmFWIgX/yE7Bhuk7C

9h8okPrhImweDIqHzaUARn5aO4M1mpA6Qe5gjHylq5
dVoAXDxhSskx91hx0pJNwvtA+w+6Y33N4sQHd2R6T9hVcRyOPR/XUHSWF1lZeH0E16oI1q9IEjImRQVh

NhefDU7d/2Eb9DDnOUxc2oZ3sPPJ8dJCJEtL9TLisqVKNyEHEeeq48LwbIS1cOOX7I8UUJ7lfx5PT58y

7T7VEIeMjkkxy1+tOrKnSMCbOIoIpT/2WlEF5OXikV
j0PkUAVfpI1b4MKXDEa05B6f1khkNvtgwlDY1gCMcKpeixR/EB34qHUN/GMOK6y1rlHOyYDkrxztjR4X

j9ZSsUHzRmm1jitoVTAaMdBxd/auvamcFVI8wpBZNuR6ak3JYK4sk9/zUqLrd72q8fHuut84Xm5ITUCh

9TDWowX6NDx58btjFhaj+nOUA8kFNuK/Y/sD7YTTT+
NJt3VJZoeQ2EDz5/YsFbxnbXG4HAdk8DNX1qzrvrukok9crIjgEkT6SOT638GklZoH72oKPlCOgtbiNe

Mxw+I5vOVyJPx+zTEOjJb30YOLgWI/Vnn410pgv/pkLmX+4JSBB9ztMOuczlpOacFKkxJIfDG2NsQSfg

+a6aliQYrxGh42EN8xELGl0kHKeTJpJl6OhjO5rQMj
WuYw/1RDEBw43ndmRno/yLU4XP4a38c7tp81sRn0TBrLD3ZcriDMnQDiBZU2oA1JAnXd09pn7s5ZnApG

azLRY1OtJL42N59yx4Y0Q3VMV8ODUcJjReoyS1Jr6QYrg16AoSoJy98CBkKcVoUdNhVFiYy/zT0pBlUt

HZtrOIlH37/BxaoqaUX91nLlrZ3C3oDT9D6NwMgH37
0Oas0ILnJ3WRFZHs19Wiei0YjD+wHCA5mQe2zFXvSUvy0jb5Ss1hHc/Lk22oESorEj4NjD7phzwMK1hw

yw6h3kMlFJxpkd6RqxrKXmYQnYF8BtDlFCd/Cp0K4HqQx0bVS4fkG/XoPblVlHNI+68Q3YhaMypLqhZz

6UYj2wdXdUBqaNm3l6tjnnl0HNhgVE8CB+enU6oTIP
Y/fFWSdnUSzg/bk74wpkTagtnntsM+GdLuDqP/eL1EDKMROThzhn0xUhNLzhOmNnLoFpMG6pnGq7X3N+

LCVW+obUg/E+BunNmxnNTNNL85QzdCveFnZ5OleFYoqtoGhnKFuPPhxhJlehqlF4ilOafddU4fKA/9HH

q6j65uoY3DBQAfQ/8pDYmaYN1VMZBquO/hwvZAMInN
NOQVJx4ke0KQLy4HoMxOIxdQjZ6TEjBk15Roo2sylAOSOzVznGU63bAkKR5eXAr6mRWfI+Zzn7GpZujJ

VZ3lbbtUHB53PT1nP3W8Z1Fm2UOOBhdHHLvUDVEFSehoocThFpZJAKkvFlzKAFon1VycbCx+SSp76rZu

AN5EhhY9B5tQF0XR1VedTQBBPjvetzZUKseI3K1z+N
PbDjYbEne04ccEP5LnGrANcQwgpacKx1PIDewb9LcS12SkuNbMG83QkKnIikv+8XEVUaLY2jRud7GxEx

hiqsqrpcLegRgZgOejAaU2FQ7YKTEpeAfe3xD08oO1LH8jNakYDAYbMaDAZKAtZRh+WVujxpRlx0ca/W

ZxuilrIphYEnAmnUEsiwoUp8bojh+DEDZsWEBa10lf
nq/5tlT8T7ecjKTTM38wNBqtxR+CwpQbFn3njQG6UGCX7mVkegFZOVY+s/9Ns6VJAtmC3dAjPATfFuDG

mAR4CJp8S849r53uyVNfqqFz02IKTOFcR8GFxG7uBYIpFA4Nf8gkjGgVrJoh4IojwHZa4Gyo5FOrI36s

00BpI5xlKNjtUmH9SNX+WdzD7ippSmp2sgLtSwND1l
6sZfcDT0Ik455iGPlYqcjiaqR4qq7Ef00N5MJ4oYjRJwR2YYdKyXStqyHGu4q6FXZMxyn9HZQcclZdLI

90hfPkXKRxOd9pgzcl3n5d3triJkoZE42K0grFHz90WVusY3ZyZwituZWPH1SPop5ZO3tP4N+ncPSYsF

Sh0GVxsBi4OirBbkE7C1O/DluVDrPQdtR2Y0EKziSa
siJthrkezFx866ZbDGUJY0F1INhSuTa4e87+7DghNeu9EinHSwNVqy7bwlt9HdyZf52aakL8TUcr1rRJ

EvS9CYHrpatPFHx84bTuSOwE1czUzm0jzFQNWfl16/4T3QpMhIpGsyx+jCY8H7q1RL9tSN2VzZI1IqMm

1eimGjHQvYze/Fy5EkbCoYu21eiPB7eXTVqjfvLJqQ
gugW/qZb9bGEJGXu8QSRsrj5TA7Zl7ZyEiGRkaplXwpIe1j877/U3vGs1TMMyF7rrL40Pzj+Dljj0gs5

goCMBav9RJCt/hxi+h6rzfOU5WhKg3LvruKdYfV7q7+6EBRlB1pLUawfg6EbsEVeQxQhZtZQO9sznaa1

6ZDWJJ4Xp/nWBwB0uJliM8lFZTs+C2MH5qhpEEVDsx
KHyAeov2lNPXYlbwkWSGXd1/KvJn6rcJHYYsJyJZr6prqayQgYJ7irAhbhcjqWj3BrgbEG6YZH6raJW9

3eoyrmE9GyhOrOzEURQjJf9L25EYX1uU8wTPqlXtqtUcPJWREfRzWz2z1wPkVgYdfMEraussdJ65tLQk

wdujCWr8ySkLURip1iYPsXdYZ6ddPNnlpajiYIxue2
J7pPl9U43voWbh6L52iHufAICoVe6+llL3KkyU9t3a5R8/4PnKLSl03HyE0vwkEaxLnEP3ysHIU8D4rM

a9v0VawFf0qdQ39yRbckFluwjjLsZW+89UnT+Pnidy7qN2ga7Ph9L0JZfTmpIbHuIJRu31VnxcFFXJ94

szWqvRljf4BSLF89cbUUXG8SQpHjreFwULRZIpXvEw
QW84bATXBy5efjX8jRaTZOLW+bYkyqDOnwj/1Df0GFXncT2vIqIardPy4HwMMnWFIYR4V1+3Qg1Xue8u

iUJLbhBWYv1/ty1qQHIMNYjh1b5WQr4Jiqg+aZjJEN1ucUuk719Fuy4e7CPc8dD8n7dTpHpXcL6YmaVq

sbH0+m9wMbdBb4gFy/tXEuaAGUsahGNAizdKeekA4l
k/iS3FwwnNKuvdcbScbezmDFBNa2NN/hX0yGVXRpfdVDWT+VYy/YbS8Oyewqg8k8UsyO3KAT5NXE6xUk

twTaZh4YwX+ryJrlJ/W87RglBVjP6lM53LRcpqhIrsRSetGNWJJjLpkKpSSAPbfRcZTFd+85+9yBy4jp

zV2CPB9j84S5+KyEwkeaUJs3PIXVDkoQo2WGH2Or2O
Christal/g85bJOwKWiTIcSOxrt69duO525jslKQ9lKOIz8pjDQJeHgRbKwonmH6d2tk924FVSRuGOhodO3kX

ie3C72MzNR3jO4Pzcaq8Gd5ygQpzXUze+1lCvSpoQV3XQ78VK6GR/9r571En6tVm5xD8zAVV23L8Teqq

Hp1o6LMTTvkkKnjVQYOeRXx5x86k0YFXJ4pMrIyYs7
vqngIfS/6IGk/78dhVx9xygcXPBURjLLVZ2xc8Qz/ODO2sFH68HLWvOYmYfwJ7liEh1CLwj1kGyAQ42N

v/ZEUOsr36u60dnt8FYqJQbo3N9ExqrD54Z4zoJavczID6WalNExPwUP4oe40951mhnKh7UsVgVKWy0r

jCDEEes0j1WKzHLzsoqaKPkdSe5nAf6cUElDmqKyCN
9W9sJIEY/mJiXKMUbuoBU3mCAFDpulXoaGP0rsukcYcOsPXTCH/LpnTRlQieHqpCtKt+LdhAuGy5pfX4

Z4LqdtdF7rGJhjBvzpICgFub3xVPc+xGKK9xnpwqDF3UW6lbXIyX4f31ZDKlpk+SgVXwzwFIjvq4pRzH

0Xv0WVG1vmjeHZBTe1X9GN5kdW3eygt0SJbR//mW1A
BEqhtMnP62QY60AT0ZR0tNLEHbcFp4oJOL+MsWBWuDZiG7PcHyqtnhNgeFOAtcT0qrSzD/t9YXE/ZTXN

wwvuWnKBm1cy81d2z5OtE62wWMQA48lv+EC/mLPlcpiDNSy5nyqQ9NJp/kcOMsyMpJ/c846DBhZdr8Wj

shSJ8VqLEEdboQ5sM7AVg7jhGx6i79dw6+0bWGZFOE
OZ7drMsyTepO/DzeYfMhtx0Oq62bj+ReFwPuulBlLy6yHvVb4PP/s1zPt7lZ+KRlpM+ce1MapFUUnsSf

EekS3A6k8lgJ+61rKHKykUliK1w698Xw+VsQHWBJQxDG1aLH+j4mQ9sary8NqY9ZXdZcBGtFZ3IhFuJM

2xKiV2JraC8V4ki8591h4SxjTYHhymHzTKNkOiK62z
mlOGMtsgFDujQNQxj7V+G94UFOTsRIlL6VYLA1G6C8ZPWfpHGKpY4lawNGwzpfODMjXtuq+Nj6xqveON

Myc20aH4OqcznJ1RixUZZ/AERiY3O+KDXkODMW43scwZkxcjJvQ+e/nnbhCCiQnVzX16SmaEH+TpCj00

XRKZASu/o79309ScgY2Pd4Ts2GF8NivyhyfZu45x5N
XMwrTbphzOtxJBUTcHpr2qsdjeVCNJJXi9o5HY8/scNx6a7x3WhxPC52d7toWcoHcWd/CmDtlSL/aPX1

IgKjrjRVMYCoJbLd68BlM3CXgFQjV/x6y95gyr1bsb4w6fsC7tRVOCsOAUU8nadDpJx+K+GEgBz9zaFF

tMMPiw+i0limt+KgZohpC/lymkeaSBbAb9PGEwxEOb
3yq+EE74txmWanXpTo4nb0wIPe4WUL9wtWKdycnRvz2kIqWM97ti7ssUz2d7aIyg0X6YLt7gjgik0S6m

/ShZiUF+2azJiuOj4YQI4+C4L/kxAp6WGcVS4szLw2DMIy7w7kW10isCH+xQiD11lCJ8ewUWliZQifgu

QnKYJ9gU9dxgm4XkEcBAa3OC9jptW8AdRnzwhwcQiQ
8vhxl/vWxkWY+tOEE6GVzB9pXKQnh/04bgcrcgokkn2xF+0bgHvBwsQbrzVgSPwYe0DrFm9mvK05iBL8

hZ/vUuWsZ6aB6KX4V4YClodxmKuWztRHQpfOl7BZ2vKGP1xD8LOjY5uNQqlKM+lX26DXfJ1h9r4khT7U

FpMsn4Voj+KIr/b3aR9OQFlSgKEtKrwo0oIClynKAw
3rxqP1x6NHOvCVKjeUduIi+4ePWbbwWe/zZB/D3n3sJ3JXT93Zr7XiLMZxEAh820xeqaCbXXsj1THz/+

L/bkJamlejgsEgTO5L3ZSWabLSvAwUIJ9s3ILRyg3CralCAexVi9s8HlkCMfBJoCpj1KAUQJ1/0+7zfO

+X5/44xxznh/7D/UX1MfxlvJIg6oqzn1p7a0jWR3T+
UJUMeZsg9aWGj0g3HyulyY04ubcJ1hfUgIER5cBjqjm0N2PFEcnogtHkoQtwC1s3aIS9O4o6IJ7ZO9Gj

eAk/PkAhiZWKPDpcVW966ZnGuqooM0wCVvh7Um0zry5DxaitJau8JEVKn/xpsyB5NdAxLKO8KT5KRG0x

wVcc/5yL8t24/k8XIVnbjJKYtogQbmSFNmB2bmW71g
yExC5YToP3mjW+Zh7+BsjJsbuhDaLH6cYzkbXeHJN8EeWpnBeHbM3U7XQ36jQHX4CX2Dwcj1dQdxfx5l

Di8o2yaPoiw8iaDwS7/LKQpr7JgI51S+Nw0e5K29YPnv1U584hWXjatm5GDkQZ6kY7/B9C2jXvIbQoF4

XE+tJexgutWSSCew70egnLGI0Esbt3sEu/SUKpfutr
iZwpVHFNl+jltKhR8+C66/T4frtoYX59qVa4luJkVxoNjDopm/vs9Syyj9m93b9rjs/xBjhxL67yQvL+

Q4b1Jh39gBP/BTXFBzLUWv70YWRcc+SqnF6ZXosAbp07DW//ibwOGKWFYnxDjUGCtNsbeKC/87zPy8IP

aczgncvI2ASL92SM+Q5X4mRRAl9xTdnC34T17MJo20
02DY3A4rWbznt9yLwAVLTlkca0A3mUjaOpD1KgRmAwl/AByyBdSHPUKWVyZfXElzgmA3EVSBrCDJMM8/

pnVcLWPVTpzJ2JQRbLnT/AV1lq+89kcVkjL4zAJc4sJByZzadX/aaijHJRil4mOgEjWIMkb/k+Q/rhLS

LDJkq9usRV/l+h0QS6sXWZTTZsMM/01C7cdlDld/bz
F0g8RQkbd9h1w6a2oKk+v/NR2T9q4L/o/h/xj+f9+w+r8N/w+LY6IU+Lng2NjKQogd3xSFiqaAb2bC94

wxXQK473SMDXcEMNqq3B9RhVpFr3UicHcwVFD+XjMTnI21llQK7wFP0CT6sKjN0+YiChTjNF6yEpF7lZ

Xq9ot5f89F5xPK7NlFNrJSAgnc2tdhfGMaa4GJ9s/O
2eVh4uwquZzgDwpbz4c2IOfZHTk/mdsRhg7mKB7rd56b5ioXTuflDN0TLopZ/ej/fPK3P4UZsF+74gf2

JNSYqTRpw7QFLl+meQ3fbZVFLw9VXeIQmlFgU44ENc0lcAs7qfhC+obOhxh9TBPmRPnYyJdXy29hpglp

D4MvSvCHWavsfr6tTUpXRxbqOsXoE6xO2uGB5Zwrj0
lpNufclH93/3h4dRBlPykiFyCw9dkB8aFNd0XoS7hlTebbeqcgk6eG3z7Vh1sUaYGoMklGr4cRHlshuG

8TOMUASyBiBigdD1upRwOPeJCYLesaYMzLITIELG4+tulzW3799J85M+vdyONwvHm29zMCs3vzbsn6GZ

5d9iVZLHDm0dx2TmXB2PdKbXYAN1dHxZSWuGIC1xiI
QKSaY0P0DoI42a+hYNq0bbra+2JNYLMHxrT3bKdhHV2VWFnDMyREqwwVk75QkaFa6lqJsooWJqGtWe6x

RnxcCf+7ufhxQGHIXsKyeM5LeRRBGPRgGWu6BkwrwGeC7yNmAEbBhGOEPikXjt+5hENb5XXoRzk5YRLU

csVzpqt3DOqLgG7eLeqpA0n5M1AHiEyfLvlW7KlWbK
wwabA75KnhdChduygohJ/5YZnp5bpeuIbdBbtLDK4ARJTglVT1uXYhO+fo1j8P9lmUE9AFxUmf2eYqHj

V3/sGYFCZxhsg9FKwyZiBKq8IMdWaZD07wO1Myz9k5qCpOE43KePz4VC3Mcg5/Tt2LKd/Hf58nS4G9rm

K3Lwc/wkz9zohPLNU5fdsveZ9Sqf11VeeJXTkdvIEY
Ubi/cRH1qu1iW0SAcZDrGPbEUTdV2dnMO9znMwIOUk1NiU1pnonojYa59O4hnqUeCTa7cpStWy9dlISk

cKwBLt/k1Ux7II3N3eNFFRl7r1XbnZKcG2dULYnR6apFWvjbppCfxc6ExDHjlj1o9FwezkmQ0Q1aXxM4

VZJXjbW1qF/TbnxCXH8KFbljkbdfuC+c4UAgbzgJNy
gMi8vp5C98Vbg+lfnoaKq+lnns0I8+ZBbm/LNBsDl+c6xVYTxLzyP6FNkx8G+gUiObyrNBHlWEv0x0BE

6AcXucSG4lLq1+Ft2ZpTtdPGfN5EILtL4Gnjmc53uiPhW/+56eAJ4lM+b6Ct1HvY2XuInzSieSA+U7EB

qK9Ef1HaioYTadmr0PQXGtyh63bbMmeIXb6v/gYHPs
hIpt1bLxCn9HUZq4fGS7jDtRQEeqAIyK3rjKyL7ak4ynxH0SS4sH3ExOJPMvba4NE7srCanBmcpdp/jr

ql1NbJibjrTMQOFMxw/HV5YL1bAORDNM05Ktk06mHIGHSBgvaF8fJ0k6Rd2q3LkxRrEpp1Q5wLq9S8tQ

++EVBEp5oxmRkTk7GVHOMjubTDf32QZs1SSdoRtHYJ
kY4y8TyFoJ162azbCcC+TMCYZ4I1gMpUCWNkyENvEy9rxG6dx+ixgPAs3uf7o0kVTiyaZN+jE853ymZF

YW7aVt8pysgOm800jZeeW7tzawkQG1tQ14AdD/TGDEXDLYkd1d9lIJLKfAJImRr11AOwN8w44O15cTRX

TbybjYozVTp9JIIDF9BrLvOePGwpxfJaAkVhI31j6V
EbYvyHuPeaIWoCpRqW0I0XzakcKGWEe1FJ6N4T1xcWbn0RIp0oFr/yssj4WSNoaQUPhOt+9xNv4K7nE5

MoK8F8K0u1JD78FTw+5j20Hfly5CnNibeaZ16zEkAu8wJd9pdsX2nNGugmm1b4J99ICNn0e2cAVFo8Uz

iMYYk1LB3RSc3Ch1Q6+WOl4/T8dAZWt4p0ceEnp124
BWFQXyfzZOKNx2McPyrtd8feloL7Wr6ge0u2dS+TGOytqo/gShKBX2GQ3szM+au794+XULmWq0Adpwb6

GPQnjdFnCiKZNqRx2aq4208nwTjntQL8eUNcS+VgkwvaIeH+iJQi+3BWhK9F/zIhJ6/KDE5GGE2CGlDS

mlto1Qv+1LrTcRiCJoha29BQMlY8DnEvGItUW4Wk+K
UxyWH100AZ7FA/oZ22Tu1JZgqBjI0DWUJXIkarAzjb7Y5KpSKYo1ICsLh37KpV26lEpNY8uSGoZ8MRYp

hyMdj30zoqe8uadYdX4O0zRGrSLShKlFI0BY2/iYkrvsmhNpt1YZNnmysvDp9KjmY9jlFF1DG8PB/UFP

TkveLmejrfxEyGluossVEbOrXgDNQjTzAf5CPnkz/y
qz4iY+cEZzl2Svz222+lQyr2OXQx32MRw1dQyUdZjxMYmAukessxaTGh9cFzvXaY2rrUAwVCGq5Esnqp

Z7CPfkmk1lM7hedRnpCU1aWfGwaT1mTkUorhgElI6m0oEzscAomPrIlZtfgml3veyfBxtwwEegsdOmmC

1marONAkXORAfdkWfXl9mC90Vezx1vd3FC4wLF48sj
+d6YT4jtlgJV7yxXd4DvhwxYGsrWVEcy7yvfbY/LeZ8ATBxBm5BoLWEIhoaSydt8Os4VvvXeZZP0nRrN

W2XarJ26xU9KAdQt/9dnBx018sW8fT0+ArfE4elYJIXDAUq6W6A6JCBtZi4RZ6WA8gAdjH1msMJ46dbL

x67CC3Xu29B8wtigSZAwowMGM1SNKyQF0x9Bl6SHzO
zvFM+/FOVeF6g81m/XhN0y5DTu5qZC1MizLc/NQW5mA+W6d5Yn8ICw+iZj1GEukau+lgwbzJ3DiBsgTT

3f5yGDSZ7RR3H5CF1Cy6YsYb4g9FN7u1x/2gtJWkRYo6/HnUyaUxV/tzIuNP+AT53zSsTb5LBaI+Psda

Xa8mQVj6STa0nRBdoHx0noA8r32dheNATJUWxvtsct
Fs1sxHugXSjuPN8l0H+5xxypCcwaq3hoeZZOocNLOrVwlbFNeu1eVsPmo+sBTi36f4ZRc93fL60oIkca

0bmFGa9ruGRlZo+ovwRdQMvSf8GlLiiTMZnX2OXQj9v71qwkT/OSg8Z8swJYFI/Ni/Gw+cPQDfoFu3Kv

zXU/LoJ8D0KbdaxqUHClq72Sr0TrLDXLXuJapszdhY
xlfx0pZ6wwGGxJt1k0IgfgwsrNH/cZM1V0tt5KTpPqBZFwMK9DJrJ+9mBMfCGuwAsFQB/7vSRvT6YorH

5hDG5tLvuDJX2/bvvgejAkCJrRXh2YQieKWYPOnWsscor5Lp5KbPgYkfhu/aQM9tOu02QM2vzpIp3wNi

/FZGUMfq7Zx4Yfm/kDBX7ckF8rrmJFR32pzFtE5s8K
G4uKUqF/HcCNwZ9GTLWJiaf01f34lIOVoVp6yQjo4koDBvMrUgcTT61JevbJB89h5rkf/grFCnB9cGht

+Yp2K8mUfevjBc4xqqq6g5OKW+n08JvsT5yRvmf1jSv7mtkkoWZ/1y8ig1SeITJcHvBNVVHirI98eReE

v988DYpk9nfuykwqv1N/rCy03DUnnNbXA9v4a3C1P+
+k67IDM0F0Q/4OKLw/w6358w1KRwxza0PIHYzw1TG+fw4GerB+JNg1pzh56PSnbKbJ4GWvd4GYKLYVA/

2l5mvVqUG9iIIowccjvhEMUKnjl2vL81ieOGQFz99tBivKCxhjwWEIL0uk3h0S9LulYPiDoLl/gZCKp9

9wKbCKf94mp1Dlrf8L66bVhgBLjcrXAVduafkkfhsx
IuvlcKiU/qJd7BJqpG9OsF1b+YrBZ4qrZ9ErCoVWvPIqGU2YR5L4RqMXxU2Q892vuFGS/RWBTNqvYefQ

v4td6n3L5oweicPNqZs99gc0jbUEL2uHNVZNNUDKdH55F7joMdJrWLo6t/bUrFGQwJVxSqRMelbAkpM8

4idazcYk9yXww7F5fCQoDXOOcWc4FHHv/4w+5r2BZs
prmZnVKLB9HrjiwKGUD25h3fKCcRfc1UCDe2M6WT/c4m4cc+4JBlBOe0gX8KgiGbD9+r0KB7ElJA6se4

VJ/K+pl67cE4ZoZ2vHDqdQLFbRFfH/9Ni7jQQOSt3TxU3rbKbf2O0k4ezZSFdBq5tPQRpoPDShbPo2B7

3k2MatkKlSNS0rhy4nrz/D3KvhYxBJ5DID7kq96LIC
V3G45PJZeysmfscNI/R9/HZ+vhoOlP41yC0GLkEOxKV3nLsbzAcB31/8DXMEIwbMz1xzJpCteW4WmPHK

pYxoQ0BSIftUdPrh/4n5DsmJsv8LQ4YN1EV6yb06ZifLdu6W8gHW9uk3uAH3JDh8RzRcOkH7wWtC/aGh

MP9WGDdGNLwWPjC6dMYNDhWLIX/MWPzX8oIYz8DURT
4SOsJuWhcG4M3q3VDs9SAT4xkAE/LI5/ose1uSIHfnmcQEVFicGftNDGmIaSGxdLWR3UQvC1s7irffUL

HMryfL2ydNtlFODr72ZYGWRqNYUQHk1NayiHUtGyGFi218+SBTMfIW1WW3XFe9jhiOiyseT1MkJ/dGRD

SfRCXmAx7MR1IQkbSgrTU8RFBLgNsqW5FFpbqAHsgf
H2Jevug6zBoxYJT2BgdT2lVQDEYqgvXt7Z7SrCXzze9FaEEYGEE9NAX4r+Tthk2CdI+wzJt7f7EXpilv

htCBPOcmOlBXdiDTyOEJ5i1wrhdRFiFsD6JM52MOFJk3H7Aofws6QhlKRaob9Ym3xC16FcamAhN3EPql

LshRL3ewPLIL6hXaU4+qVkEHWiUqT7hYEojyNGdZ2P
YfHfL/SsCwuctEAHsEzd+7o3HbNbI45ds16i3guw+qyYOPgpJPEm217UOHjzfQyQV1Ph279mUg9/Zgbi

BT//wFXcF7zYHQYx8CiXFdozAb4IbYee0bU7Om7kHl15DYHXWZfNtIe9Fld5CqeWgrsrBwnt+ZzXIiBm

cfSsjb77+SqtIzh3Uec843IkET7E71uNxKNJ2xVA4v
FueFhe7SLZQgmJ5nfO9gZjASdKqT7eLHS8f+7coarjKaJbHH1ZhkNBx4RCTO25pIK1rig8mHaSJMerov

3Q9+QZpOAlQTHmXoSbwWLDAz+vDNSESEUZ415GLa+agY9yBWgNKg+0YsDiSD5dt7EKc9LX58SQujTmdQ

Zg+FF556diwXDbC0OdADDlTgF4mcv+kh3dZNPjKWLD
vFWVDCf9Hl9NcybCu8zKD4Qop6syMyHCVPbpdh5TIZkZ0lnrT/YqMaOeJBRtpyntOpZwrKcoDLz4AYXu

Z7Jyx/KTLuLHzZ1fXW9A6fojtF2w62SbZqlso1s8dCIuAFhMZ7+08S/hWh1gtvAKV6fL+WMd0NLC0+HV

amQU3QouEN5HKfj/PTp9ic2iIvtTOb7jDI6FHPOLDi
u/P30GTE8r7qbBYOuIm0/j7swYGZoz9wotgkhmHzt9cdLVvAHLB9jxCVtjqV4i0xTMXdOan3T85lwdIN

OzdvkLRa4uEhiDX9Fu492ijP9h7cX/zMXLSMWPGD8izqbwNNMZednu0y6lX6QiIiPd7H5GUwQOIGyROA

81knFz80HJfyoP8CGZsRQtE318ZyeEi4ce6XuVdhyg
PsjdX8XlUWCjaGDnxPUo9ciXwstzKo+CFQLav/CoT5JK8/6n5wEqm9e3YJiGhrhMJ9ZNTmzn6xk62gfi

BDzbufo6IPKZMdDVcFLfeMh7Vtj8fqkhCiaWmWQYYGNiyhaEs/UpDNaDcxoSdRRcucGMSkJ96ETPB68C

LEfFJ7OuoIFUoSbDrMAii42PSr3g2i7RflM+bmIg4B
unsKxRU3yesQIphI8CUcYaTaK29x1d7ekSbGif7OCuh0bopdH+/0d3zcFuH8g0aVDYYWFO1KwxI9gFBc

2LzlylYsVUqU3zwyU0yWTnWCIQs8EdS+R8cnRoxyjTMPDbTW3nzk9+W82NOwX1yf4xLl7wtgmQqA68DB

h1JWU88lPfoUjfi1Mlg46Su2ZbjJbRsXUJ3NHRV9Mv
z4Ib4Jx+cnexQwfN51xUhtFib8IvxihZintr9o3t11QKnpgBlqfYI2A9AsN0eaVezKB02o0HtqecJx99

F8gxQuyNjyf4xuTV4zcwa+nu95A/w8uM78xwjafrnp7SYNI1k5dxIDfMVANb4kv5AnrHArNJYeQ4y+pT

y7wMvUIL3dqkhrnBjk1+bAsGwN+RJryPAkwQk55BkJ
FWdkc6Mp3pPd9WSUjm4nMKhxNXl9VlewKv2n/XCh8sW3MDOzpXFzbM648+1Cec8Xb9j5dlbJbuTZXjWN

gES/rv6f6DEBCZ0Pldu2meJO3PPyguSVLHq6FuKh+hNOA3I4okDsi8FsUssQntpuRnM02YG8KZIHJZ02

4tifghTz4Xd1uGxAPdqocKP43cvl73CJd3tG56wsni
BPQSYpB/QO4ET26BjlDu0CfDGZv09p/z8uIn8h5YVpLJh26VWNeVRv1IuARe852IV/S1+iIcBZEI74F6

KNE0rotCiyvEgvBte32wPdQtuBGtlIdh1zPLzN03ce7Qz5Nwn3kXOA+94fgMVdRCR8Tv33kncR/g7xrf

SmJ9NA1Lk7eBUwAiZK2gPNIFRZRKCdAnJX3GpVCzer
Pm8Iksln+/QX+dSuiPTD0dEiRncpoweWldR+2tUF8szjHWTn84oOV172E7DNPxbnnCHAoLH04VQDhpdh

lwj6Cbnz9aqrklsLzRhnS2OXQMUFaN9NBB+LXR46LicAcDP/CKSq/vUDhVcmQdn9Stcuxc46AwB6xdAA

wTC3zCdsftYsVPxUenUGkSC3q+PkUcdPk6gNQObWIa
6QaMmNpoVzzQc22BlQBofvsISaM11l+jdIhNncX4fTjOT7EuguA1us7G7zm/hIKHnAUYXBK8qw3gsp0s

aPCe1Grai0eNjV71iQu/305dWcHBl8YNoVNw9PKeMEp05KKUyyBPc5b0OPbkcBymGbZsyckdT8EkrCOh

2KA7r3cPe7x7xers0TqOE30/LG6wLHftAXayuZT+TW
9vdPFoEAyU342voiZEQ98tRjFTW4J958G2uWKxHcgWllov+7XFEE/SC5oiPU+BDeo4PVMoGIknffJmnn

rp48UqwYPXn5hGZKDt2Ztmlyq+MfY0EigCsqPMotmjcMBDzyN5v3zmwp9IpflBTa6L76KAr4DY0jKbFT

hUjU6K/alHzpwRe1/68CI9DUZe6ASg5mEHZCKILFNK
wHgGI8tSweLHR6wzdmfNgGytKP7j1pofCE9UnGMFGQdUshfdvNPQNhrCF99vtZSr5Y0hIa3CARShP93N

PklVgs/3vsj5U9YBmLKTYiCGTsYbTr8EBoh1EX1WYJXHQ0P0OzJoUG84+G771b6zJuucNskpAfOb6fcJ

OmLLDSE93Vqyh+vzLZFmSanBJRsaancEoDtY5os4ak
P0Xc5Bl6r6BfOUJxzGN8renHDhwjh64Zq2QzBYbh3YFnH+gWv3sy6qppgk+Goqg+PVtXl0IdT+9eYrF3

ol1IOtCqgi0H2uZ0oGR8KGeJoZN/nMilT7f+My1ppsKIu0MA2hHAVS55T05IWCea9lOKf1pph2cembHz

yxhdsuYEZ9TGeDQaKE021kqLEBTumR6KiOMLpYt8zS
thiToIfqULHRgLG5fvpinpiv8QYtmI43TMVhVtSH8BUC5J4XjK6ph9C3O3wMuN7qaGH97l6lnwyfYRR0

SNz8SS5PXClXVY9OO29s7ZbCVpyriVJ3U02xSfNyHiLmCTUN9nJz4B0pmWpqqpduesqECFoyDWznnkSK

0RHCNJXmaKshFmtUCMk+M9NDBfz19Su0ZgnfrWk6Kb
jdOjdP8yb29WYcHRjGctNDEW7MQ37fmkW3yctsP54rdeJ02w9kQatuCsRLyECljtl9InfkiY+JeQK6fV

O92AKOhfuz+1bo5n7rFi/vkiOaV8SRRhVIja8YI6C3LSgpz0C9nkNDLbaUjjLbhwoBuaZ0ZQqt5geVhm

rc5ZpO99INK5g4dH4qp3bkSl6rdIeHVFrrMmfH4s2m
iSnpXnSF0jehqfNFBBU8Wi0oa8MSUmowb4WprCxD8Tly4x6vMmsFynTp4RSFlTe5U0OkUSoKhtrdB5AO

cd7UTdCOWwqO8bo1d7IIfykAyeZ96KRWLx65Pevgdb536n+6o8hAtZhFn0f9zsTut+hwamOorqMbRa0n

ZkGJKIekSfsXZHPOkQZ2BvGrgyeWrGM3skqU1RU4Pr
1oXpYjYplhFe0X4XOfAZ0rsOe2XuVJVyUUlz+aCDgFBqzf4rbj2IoQFjZc60Gb24BjdT4JK9mWuJ/GBy

8KlwREiWGvHUFAN6rXiMVc5jDvd2oVOtiITdBCt6a79X4HaqPfzLb66RwRcvl6XG1+ge9wqlD599z9nN

Ogct6Np+qOurRF76O9Bke0RCVVhdXa18Y1DEQU7LTv
lP0dccOe2izKq6ExmlKD0HZ12HW0JYNBoPUT2GUm769+pql9MOZwPDUyeHWqstJmPckFouHYxsRafCHr

T06oUrrIXkcWToG288aXhsgBifB9vQHvUJDADNckkUhYZE+niir5MGgHYEXa7k6+FZaJkYmBpunakIk7

pQWoSpmIk4CXwV9s42M3UZ2QbRY7bVgY63V0P4BYZl
qN9dfEOJxJ0eFb9ykKiwueyo2wEmxKgBeHB1BgmMXwgcusVdhtyJ+Oncj9Q02GGxcza7h7Vt6vK9W9bA

ytrJz5NrQ7EWKjr7jzlOeJV7IjBLdNT/QQNMCPvjm8tAOI+nhIT8zwzp6R8Gef4zsnIO7EfjWn07TzDN

CoOgmew4XzGVcZ1xnP8ZBH/cRUG7xmz4cWqq1cRWdw
j+YIU3HIisxsIdSKsJQdPFgTuk2333+3vv4tNBbzT4O49cD7+Me52a1CbDunCqJARs+u2Ttqi+6MNNUK

E/P9S3NLK1BbVH/Xo+4L6DxjLuSsyUBVicCsrR5kOVH4+WZKQRHQmFluCz96RdmJy9uI+TOxOZTe1zCn

o0u06C7wXgVY226dBTnDlFUPTehA9YCTyym3Vs2j/2
tC2HjK7E6n5yMJAvXB8IKKZa27kUBKue4FCJBRzSe3m+UEkVI6R5nAyx0WOQcJ2O1PvlNIedUQUjmSa7

tXiM/FHLg0PU6yf6s97KR1f2gZRvVhSR+s/mPETIRZQGdNnEGU6HKmrus4PtsO2TEXjTwQfO3zgnk/av

LglIHrghnd+FrouNtL01cdDCpbKmVbL3KoV+csULui
ib7xnxIe2FmN1LCVxoXWsg1sUFCZiejsQtiFYXrM5qYB+Pg6JT4IZKuWyj2oQM6c6k8SFZHonLiH4luv

z5ykuOUo++p+WRq2nTw/zb7OJG9YAnvvdkJZ/42q9f9KP6h9AIWZljeZxA/JfJH1CRUVBo4WQT2/gusx

PgVAvw5tPWj5pg9ZLKvaevPM8X+WznP91o0MGoS0+K
hoAfKjDIGQUiQki6H1gMrwrK4+5kQ9IgGBTrCN19ahgDBADw2x5B25GN7bR+wBV+5CPxMhewewd2Mlg7

ZpfZTr7HqycFE5t11sPdCaYvW3e4JUsikYTpVV8yqQxS8rznJrL6cS2GErVz+ocXyeMVEMdE9GMbfC8x

gb2lD5zj5pb0051F2OEVT1czVCY/o4S5sNOVwvAtsU
PUBMBDPF8sjz/XHlFqML8VGxEtuo/GGr99/d11AMECYDHtkxpCjUSp/ONYOCLLXheA6sl17C9ajwsFVX

WCqbwxHowey2dqWh/hpXoAcz8KeNUzUY8Ku7djoSbTIUAeFfEdJtERj00hW7DzpO8tOJPvnBhBYhMIPX

fkRM55vGKnd8WVPw+2H7qYrNIs5uUd8CER0r1935Jv
usbkozlSUfRE2Gguyhx57Khvp7SQ1jO4T1+5s5P/GF7HWrLvyeWGEcCXyXvtROxd6+uN7eZR0bEMrarR

mn41wTmxKv+jRnMLyJBfoN/Ief2g196g8vd4CwAjb9yRFYVaXk33AWa1HhKvXPx/4D5xuNINjFiCVDbx

jDDmbzEpGE7idgDvRHBvq/dcFz7oUiTHluDamIkV90
rRNRzSzR4ZGxFxOA8aHXcJOhGHFML4S7hd2xJK7Mocz2xgQXichJFayijXGY5RJXnZr5kej8/xE8hV8X

fVOoBWxoi5XmcfX2xmna0Fd5g2VaazNWbklcPmyZa1K5NGGQLU4lJWo5HSbUTi5OkstXsFMUS6t5gOe3

ty3DCk5h0VkoEf+tqzWvHrDanGLtsPrmHCS8B40M7b
0T7Xd9mXdnu7+3bcSncsagOrU7PbndDCxdFzFFelsJ/ApWyMU+pIVbE075wlrqAa3ezMuQIAmHkm6zPH

STUz+/Y0kFlj9wMu/BvKQkSN8wnDMwp7NQxtuTZ9g2Hfzdb+z4nVgF0TRFK31LoBJApbT18eD9+rOk0J

nvi1iWxVrima8f3o498APuIavczzt7qIZw+4heD1Xg
B6MDCpNWCjdOddWL0EcrjvqjeCqx3zBXpxml0vFgSEjUeVSXXd0DnpG0rklF5cG/p71KCdrF1FsoW4Ym

n+kjEaQ2GV/+D/8OFlJcxsTdx9W66axQ+7/C3pmLmd+rxdR4xZt//pZlFS0qBTdM/9XKut6FVzJXCNUW

1HUDGABgONROCI8PeB1DSVT+GpIWqYWuh0waU75Kzu
Vzre4s349m/ISkAfic/Ni908RbBgW0IgdBT0pKyVfnjcZau+034hen1jB5y0M7hCDsu845SRhrY4v0u6

2zvaT19WuABc4FaZt3fPdlvY3ur/mONFAAB82/rCld78jThkjMD45bBhBEMPv61NahweItGV/Ir8QMU8

hWhWalsJzDb21Cr8bxte9vHVA4kLPhUbQDH+iYyTgW
7faTXO2/sYGLA9fBdhWXavXihWXOeRsS1+STHdqFh6LbpO+8r5fqqQejGTkvNiCVPkfoeTbtKxOAb+tC

aoJHT2QFE413rF0lLG4F5edIQP4wKlUXjjOlxPG3dN0p2htdhL+Uj8Er/OPe/HDkZR31OaiV0zkDiT3r

8Uy9+BBBdZU5Bk8a2qxOuOXeUAHOO6XYG/0bG+DWu7
A+LYHyvBNcDhCLC3j1S+j+IMd9fiUqmgCROvcHXJRD5n0Y9jCDRtpEn+E3uf+up7r8xV4L2fFsRYXZ3i

O517AMfpjlyoRdf467CkNzlK2oHiQmJfKHpzaWjUyaB8WRVSdmukAMpI42WwrKPkJXQZBTOiObYCQaau

SImg2jGdx7gZ6TR+eZlgZttrkzHLbwfK4YFmn/Fu6G
fd28SMe5SdZM+o9ydLj9XzmfSMPaCqm4SJPayioPG4KJiDfbe//jqOENEA4mpVrWSPtEv7PPfEFTTG9L

PJFOMe36tR14V+i37wC47EiwqxndH5WrVCEc2YLxMW7KsJMv4dN46O/YIrCgTRfnpTt3ch/uFRffpB5d

ac9cYwqbV1KPVQGx5vfy5JLQjT+JitAKl1aE4D0GuC
kJOm/Nq9nTs0PvfNQR24VEZ5ZjFfsrc3KoBcbwTHKjS+vcMh5T6E561p/zBznBX/VHlAoMLRCFktTcGX

NBoYNDbp/UWcHPA9xf7y2wE9yvKURn35gbUywpz88feFGqF8EJNwIR9tu7C5CWRLm2qd59xGK7HFv/2u

w8c89FU4vPoTyoQxicZV61PcxHMUNp/vvXDMyEyvrz
IxyobHa+3/ncqyNbQApsz7nYKmwgOP5nAyHB++OpccFMC6d70DpcKkZ08iPcx8yZFoV325aZvEtUmZgW

eeKM5ro4kRcLy6RSXJpGGHfmGrn2pdJpTiwE/XDlZxa07ahR/KAek9gv/YAvpaVTEoFg16CXvMavpLeJ

zmp2wTfg+oNFLsoZghwmqU07ma70lAkUwtWmbzznDh
ptIf/Vf/dCdKkMyEtLyRBB3DN2xRR3DGVDkRqkWx2z4C2CzohyzO+ZgMnHj+fxskvHh8GRVoR9dMorqx

YrkFv0SnMYvOhYc3iSHS2sWJ7qdA0w9jjjjk44EobQKSOE6st2w12vrIuHru7PnZ29Un+sMvmkS53Hv7

8LcsjJTKRhbuAkuXHoZoi6YtF7vvbdeQ8m+D/j1plC
Q2j3ZUgNJBH/nNayX++cUAtlFbdFEHjaulbta6/fh0sE620/uEHl/GyBcMUsKBxTWqRdkHU+szjPvDef

J7Hjti0eZmxwQ6k0OGU/NNvOA/VY8/53AgPrxlJrwaZc4+6nW1YBVk3YaWsX6xIsP1XcM6xEcu7684S8

wrQdsn3nmdBmmjZFOt7n4aa+LVF9YCpBdxvbWDImwd
B+Pq75a+p7pZlYEYyjUMnzdEFodnsEe0jezCcsAl51yrnRsqy7UYgzyW0jBBxdVUNBuN04Fn7jmeD/hv

lJZTsPvMk6A2b7Vz/ItB+20WsnrURsRGV/HsdaMZ8/MrcrD1pcxiApB01xN49t4e/Mw/gVaa5cfdrtm3

EXy4PDPRTzzfewmkv5LOWzJQBUJxbO1IE9wTMF81wS
xZ74GtaEPvtCRdblT4ziFKzQaURM1+ZLCZEtS5DE+mQP1Sh5ynQJq9tGJBH6cIKOR+JYpaQFyA4M57/o

a3aEeNjlpBAI6gLzxXRV8OjAq0Q1JHhqErP6NR2dt6WXJVtOi1WqJp1t3PLE+WDa9DSKkaehg0652mCl

mayuWYJndBp4sTjI3h7rQ4zNRkglGNAlxssMtIVDos
H4xIqKJRTY2E18EgR2rD59EeAltEa5B0M7SZagOm0JoxnAvidIolzl3Uq7ubu+AuL0yjzkMTHEL50KZR

L+2OSvfnigVXAVzMvxld8KaalzyR+1R4X1mRt0EVtgJiqXvRF/4NxeX60eXlB7NcNtqSjRxx4fmnuW1O

/V+IM5BeBL+udCXP9bHdtHQiOkG9+f2LHO9ptTqAS2
9aK+OM9JIEiDqE1wxQ7Bl8yBNgrJ0lKucgpPEm7JMoHk2Qp9M1TS1nW09OS4LlNxiUUVYmZ3uX3mIV57

qQfeO+JcKinTg/Pm1NABCqBWGPdNiG1TBEtyxvai56sXhT/10dgj+wfMDU6PXuO9SaECLpazb+OvIDCt

/lipSLD2+XKGJ0eJWyhOq6bfyaDbej4d2b/3d4QQ86
tBocicwPiU765z5ih1LZ0iBsWM7hj8kIKcdwmMcIDOgvd/9ZcbWKZWkdlxU7j6k2fCN0K5DPZ53c+OCN

v7UOvej70IrBe5zSk9dVSwwCiFRDIt+FQI1EdiNp2gqKUZM7K3z8kjj+7lANuUVK61alEQWxJb4mywp4

irj6gA0SLrh4b1m03NncKSwWqchp+YGt1SK2+sPvIP
8sB+b8A4xMcM1Yy4OWx++Gu+pM1PMa5rHVvAthDlAiE/obqFguQsEPwPtPt/JuBrchRcbv2IeyWgkV1G

CY0X+JOyOjfNyIIxOS9KiARsi6KXon24Vic7+UolzUqct7I9Vmi1vhspT76yBg5f4uGQ6/5uvNKbTT+P

y4aeVTOFxe+w0iM8NRnw71/XwbdqGhNOVu/vz66QEg
n8idVZUHOCiOk0JfA+7zsCoh7IeOR593vzVCPVXyNBRk679SEFiZpnOx9l4C06xb7+begalgxjgw4rIb

siyiVJRvWJX6uSqvlfDspOzoy5FCuW/YSjDGmiUrSnP/7U5jGLpEWGphxjGIRQ3/7ho7ERou8Gan7agq

LPebK8EC8tM2qfyV/efhosP9zVHtE4+dBmN06v0Oar
hkmCAm4gkVknsBAr5kDya8DOxcxpaf/hsdnJico1EcgpQa5++mlD+AHSIpzRFLrOF9t0M3R0SiZU4FWd

78/wNDNpbQcMJIPwAhHqjV2hEnR16+q/3AgGEjEWoHdUfqqk1dsPBmKEYPwawBAbfTPfcRLtnPkYO8Cv

4VIBdDnU18mmY21DBGlvOx6apIDZSo3+glN3PR35xC
8BG3UKjVbPhW1y2HJC1GsLsZ56YVsl07Zk9PVMD+9jYgdBR7QHjC7snj3gBXL7cijmI1dN9mnbLsj0kB

wfaNR6xHZGWBgqNFbBc4kbFdli6SW9+xMf26JWZE8xetzGiRr0fFrC1dhFSBfh4SJSgmOMS7w7TTM4e2

I7AP7T598rF3/C9rhkpLPcTsnW9CHF7DV8lVljiFb7
Be8wcsoQB0Ls0C/csMEykwtMpo/zLQNb0Mg55Eg0/W7nO/+F055+JRdpyNw7kPcAtwiejEWa5VkHspPM

cKvQ/Kh2+4gI+7rtXZNuHmgBr6LN+okRN6HM0P6iCenb8s6L5wxgtBs4n4QbFf3AG7ophhNoG4IFlNcZ

RZ2/pCbSz5EUCtT8rgv4hPA37vEnKWlH2DaWZ3rqwp
+WcpS4/838umnmliPz/UrJ85jJtLuQxw4ojDuB8/jSu8iX5udm5EUcRS9CPLhsQvbBV+isZh0yCBAiIz

DLo27ZIA/aWuLo+9GvBdMZ92Y/eDP460gTF+3Ju7Rva+/dbuYRkdADsU/u1QWzZIS/3AdCNKimsRat7H

zo+T2jxqgHIpmn8gt4hqcyt0Zr5zaXIDJtAK80j8ML
MLpBsXBoVdiKYCj/JvdbvkAR/XatLSCGwsmjIBQrbsC7FojeG5/Kxb4hK+vnfHLeDqiA/ge3A8eaxz3o

QkInz2/qFr0+Sd6JIL301n95Mrw/uHhhn8Y0QW5ioMm53J3qdhOOH+h9LvdyFU4JqwLpVPLvbsgFmW6r

68daz0wGsnLcn1xSG6HjopJ2mluXEV23KnT6Ll5gob
VSzvvVvL67OTJqiPZHcEnf7BbyZaD1VZao4VfM3AqBaQEuRg8N9O1W6qIFzBT3p4Z3v4hCbT1uoi1uwq

Bvftz4jxy2TLC1+0VVqPdra5tAbLwM17+vxC8MVCYSvHUff7kLQhfZ4q9Pu8w93peRzhXQI4O8v8pg8j

h2rpBfO4W5u30J1cZ+/LoKHT2JwOmrAew+KIcjSCfp
PGkDAcggaDMDZ7fNDkt44Bor29d6rgn0Nq0fxF8Hm6alM6V/BEYYqGWbBX+jSsDkznrLrH9qNJ/4DdO2

lmkV3+abecE9Grh3wmetnAeIwKSKTDf51bHiMgS9xgkru2gbA9if35ByIn5g+kn/qyKJcc8rjDqkD5xd

ZDPy1sqMfjpqieq/eJqrH8WmPguVc9HoatJLZHLOc2
8F75ca0wh4qlo2YUts2BcDJE9TXlO1M1RiNdGn/LBrlXI5IQfSm2WH4B9lHd7cff6yZKYlJGeD80OXV8

UoCz0JbER++aAmtSgTxOC1gawWxiTZ9cy9Pn5ZUz1Lti1AsBb24vkQElMeWVnlyF+/V5OYB5L64/kMDn

ZbH7gFoGbuIoAzUTF6MN0NbCD+xkdDSE2Dquxsl1G1
WNe99dq1avvtPb6TpOhq6kGn/M3RioK459kuce9dJnFA7WK5SNi4d1iERSh/K5IHJA4tIApUN9YcNuC/

OkWZ4g7Vy3STKjoafvSYZ5Axk9ey3Gcou04Waj8ljIkRY2Llrq8MRPgNLL8/a5IXBPOg84qTFxmkjokI

Uv1wbA9UDa5pFRlc9owvaLYNQl7m+DDu2u9ksIxedF
BDQAQs0OueYEx0LEM42rUtUQLJdTPcwkND1gczZeC01mW2Zd3bW4hjuPtPvR/0NxxYXQ5oX7ETfPQy4W

YH72dPVtHUKKCeMYdHe6HYNhsZscXFjZmDnjW3HKHkg7LQvbdQc58h8q6t7eGHauj7Uj1qJmvkXpf72v

R61zIxW1911PcKIrPGCyVrQfO5xnWBaGtyi56FvFTZ
GGLUBcILZ3n52tTDiC5TD10+4Q3NmScgsyeujLoIjwgCcex//makP+jIKfjY4Egetk0jX5Wz0OJt0v/X

3k6pZ+e5b8x50wMztVRlCY3YP1sdB4xKF8FD58gbXO8uJkQTzVcOfnoPGt1eHzn3Hm5a5meRUY+f+Naye

OHks+u84Icw5aDaY0//9Kye+/3SRnTOKCoCC7e0Kze
kaYCof6L7VTWj/BOhJZPuJ8eUmTzDX6rQe0u/PcopUZWMXpCGmT1/In284wRSdpC4Flmvgmd9gO7cygf

GGlRj5d3F2kWHeHenj+bv1TMfPD6AiQQ7Z6hW6eeoB85eI2J8QbKkjv9a+LPvjPBsT2BLcdc3Pl87xNM

6G/M4jLawtz5zMorN51OKNYdgn/jkt3kQhRl9sw4qw
lgGf2cbWsTVTAwJ+kaCoahtPBU0MPavPMM5wpIlHq9mTIU/ZVn/3o3OWdG8mnd8m+URCJkbNt7rNztAk

IfWJBq0SxvMTKJD8i+lPNPuzy3Ukw5YaO2h14ZzkvAlyLZgJnn/1e1gYeaalXgJ6iGh125/jrWBBZGT7

cGyTTfRXei3xoAxm2YDdAPUxXT8OyOHB9MS4/oyD6f
nVpv8gZcC+/WRcbybwj42RpxDf8nmr79ulbf/7139tmBOpIn/aXZLwoXIv1mYN6MYr3Q+llg/0Z8m0zx

oDuMqz5sByPfbbHAX2qpGbd8ThK8HrT+xJKv1X36zFwpC85mb/qtprhQQ0F621kaPnnrnbjErNWLUjsA

VwMcKH2rouvFiVgPlZpeSMALrucr8jOquVvw8cBvgE
tzjMCQN3Tus+vVGQPzyjTgnLcG+5eSeAXmEQ3yil0sNAA29LtXjfjdWMtpn9kFYqtTUulUpX/+1UB1SZ

cR95mYWZ4QH4xyRy0vKWni+EBg0vatmm+r8dY7RU50MtvR7b/SfvLMebZTCIpVMVhJgipr/XrxUYlJe3

fCSzEsy12XZ134AFEDX/z9zHXhk8RguPWGVii4Tu85
DzZ8s09gmXzGMsV1HEWXHpT9IDh7KUsSHF/ueaziRtVcay/Sf7bJi9T1LgV06p+a2QzhPnExvsaF4yoM

TPwHYXAzzYmwfMcZM5ajqb2C7toKiF6uoMA7slKh0Gerzq4UZsNU+LrKnXA5SnlxfdjVTIzLUw2UCOFd

yej0hKSERLCfNduI8HLQ/MDG6eB91jWEkR8rFyvJMf
bkezfHuQplTShlhXxYlt6KAKJMBP0D95gDx7WEkMJVyFgffJssxH1UduSbdkfcg8pmGycr8ePDUpG+Je

cTKh6/AwRZ+kEr7ecqYe7iWhRKIf5H8ME9vCvJBUwU4/PjuLBV0VBkqRsnN9UEv66UG2fr26hX2K7b/5

uvUm31fJkjReHCnJTfC6BjPaN00mjxWeqS0oz2fOqr
CqsuO23VmQkSKZ0xjto2DAG4b7cGpTrrOBGD76L9qNbdyqeFGE3/EHnRz1TAv8pcRFaFVW2TR+3PowKD

tCdqalOWNGe0+l4deQr6BpjmUNhvt5CYm+8LlPj9aQ6Y5T1DBltXUzoXNc79a2dJvHsVBxrwGa/KP8t7

pQNw2+JFLodZK5ozgAYqg94Mj2RF6LIFgdUvtzr1J6
phyJrj1uny7l16i+dQkzVfwOjGxQMcwTj/uH5HQCWX0OW5XDIVYRXbK5Ru90Kyp3UQxLAd3kKQhdIzTK

RBSAMoqZ2Jxbmc8I+gaFhhQb89+dzhksdZuxGCQ/eJg+gPU+vTcTiWXoY9YMjBqzjJJztdiMMVlclAUE

KuqfAbTUM2aoawvetfTI9+lkyXQoeqJnelVg+6XFzN
9uGd43O51fZbLQGhyHGAiyPwbV9KxQ5Ru3haAT3ymymV5w5MfZJLrkPkZ5zlfkCJSoQaGrKWDrrOa+qb

MGpnYw2408JYJKltBp4hZfT4b+dkne63A81GUAf5socynuSEfCMLLdAJJA4RM+LjPlOiC8vYHrZ8/UGC

yKNHFiGyfxg8hbskN84zv/GK0Q79kL/80HeAvUANHz
9Btpxq0IiWjDARubW0wZv/RKIQZmEUQ5v+ToA6NKDkBkDRYY+a+lSmtkcxQACS7KZnscPcunX3Cmn/Hq

m5yL/844F9twng2YZa0kbbva9U6xva7qY/yAVoFVinI7dsBzGYfHBnrltTFv8U2M1culS4BWIhiZowXB

tQmMeQjAN/286y4C2KQVdCAwq5xyGSI5hh0S/sYrj0
P77SG8UcQ3UH9eBarh+sbDQKDWeivVIzHn7Sm5JVuCbt/V2imd00XTH9oTL6O3oheP6aa91ZlTFKcpph

bZIF8JRjY1+PONSbQUX+qmvFYmUIs04miG7G6EAiAf/z7uS5f0dQ2zoibbEkXfJh0edEoeyNiaIUSRQz

Rl/i3v3VeYz7T1Zcv2g8AmRuo1rkKC4qKDjsziDaY+
PofPFJ/RbLwptFhe7KptUrftl31VaRgdRs7mUanRltUvGNLuF/Xxcr/1VcNEPOIFclCXi+h0ZQ/f+8zc

z/gYJpV63JdyrGUE8Vh9c+Zga0sPQd1pAHT0k9id8M+ALJDgXut8A6vEvpDVCRkjQQRiXq+BXa0MEV1F

iH9ihqYmQCVAzVoFyzSwXEHVk5zql8FPQzstst43Ta
o8nE0yCgtmJ0qIIyqm+SE1luox8VqYNnBmtt5BLNoqkCVb4RcCe/SRXSZM2duj/5NdI0KVtLNQkxsWIW

eD82t0Q8PQfc/wWgfDEO3SS2pqDNcC0TnER+Cd9tKxTufLdh+b80HaY+f7w17SPofy+c+ViM9lLp88F2

ZrexNEHgYe9ATWpQZzB7HV+lo4tSHQe453iLHrVQ2e
3/WBWTG9DDnjfbE0Ng0KME+rRxHNl11Iq3lOf+zkaqRUpf60D1XoyqaUD5kY569/pHJ5uq6m7eVVpIUU

9Ym2JFIgMcuuPfQQNc3gR5V52wp68byXR/PvRVqr6nzjxb0rJ3YequA1BhQ5HOJMs/NKOFZZLTTyU77U

UlLHWpEdOpBLyieHlCi58r+MjYjputIdz+cg4KaSnS
Lena/4cXMxH/Q1yj6+4+vGN8gIIE2LVe52IyGPLrM7dzP7Bsj4o3o2NpQdrpOhI7mdGS3hyoNyxRcrDK5

0U8/gbGICz0Fluvthd9cntz2qzDK6lN4iMy7sOTeyEAOedtuUl0RT15Aicjlm3GLcjvQHE2f6NqbROPF

NDpBJxUPFsHgTt7rJmGjIvx0N8kUV4WNQjP6DPjr7h
sI+RoabfysHwKBK540FQNkOfLCw6l56ovOpVj4AEMkOZ8F+RRxjV/z9eBkp5xH98cdysZugxczerNc4l

xD87ynk85Ul6uUibCpkWwSGTmieXhQ25N4VXTd3ika6+anIg9mPYgvxwdRJX3Gtdk+mGv35/rsRGztJS

Q9oWioXKEAlTTBVhHa0zpRIJ+3ogNn8W3edAO3dgFn
4n+5rrpXIfe906g6U/pbJk8gAnrHkJReEcFDY5FRT91YdsnSJ6x5rJBrnfsQzKoTayYn3QQdFur3mQ14

3puZ6Dvby8kGTmd3zzhb3Id1NrZipPbdoodqFPa1IucZ4xG0BPnkWfLbgepsIFBJxx9TObLOW1pLi8A8

wSjsdDWp45PkNExbrn8ypKOQsar7GOZfaaSbOwWVco
UqtQUzwVwsolxRRZUzHiB0CjvTvEuAT/DALRcyl2+wS+HWKitCGf6xIU0kiojqIUXzCo9d/F3JPqMY0K

1+QDzlPPT6YnI0pjFU4xZB42nJSaAYb7KNEjQe68XTx4F1qdfjfVhSO83ac8E5tl0qup6OBRhSJIZLdi

+8nNl+gEISliBkJtEW8FXt8M/7Etm4ixeEayOU1mWr
5ha+AsNCf533fCoi71bb/n7x2sH6+esXKk5dmmUuj7aI8Vri/FzKbn35M9IUTwt99dm4sAXZ6X3YeV5B

VgyQNPCIzoWyQZogYUK3AUeTFZAKAqYyjnJYDKs9NopACumBif4bizX20tG805SFezlMz4oZ/xkOoFWe

pbmSlROh44CaeGDMPH2TNYDtiZRSjXwNNYJMzGMSSZ
8epgK8R3nVlDXoejhLW10fXswQymDvWKhqzXK8f4UMcm0TIiZRmz/FkewhscsA88/pyfaMpBX5UbXmTF

oQLXyLq9OtJ9gpcsx+05A5rEFHhtbXcXQWA76Ke2l21PZjGxzVhZSiMfqAoVd1kxfp6zDVgmHMq9x6Gh

6hmwrj3VYWn9Ff1i4VWUBxGspiYEM2W+nKzoZKuwCg
MUJ2qOdQPeFv2pBlBC0KR9PhdsS9vGgtlMEotWXef3l33PjRgau19DFBfdvDKI6Puose42a6oHQBQGmP

6av4ZV2zkUEAP88nqVxU/zHwRyWQhRkMpDluPfwBlrepqsmtekBt5lFMqX5KUJz1AgCJdKRBsWM75HkI

mbgYSiBYM6Re21xHpsYIaZ2KrHoABgrs+cbVVVq9/+
lI5ErXww+dFhHEeEWtqH8BZk2PUDMfvFzwnPswfF2KQGD04MTXdAKeOOksCmzXf4b+6fAhuTr2Xkrpo2

W/LJvVB5tke1YMyy9zA5IOPPcl3oxurBGxb73wE8ARYtd+Zc+3wnEskoQetSh6p+Zw/4huIZFKTkTXz2

aXvBRPwuGw6ihN5VLehPVRiOnjEtj3Fd1rLc95+kb+
KaFNhifAavS4K+892yDt8rMkK+3kH2tl+GLznR32nLBoxhonC6AHEjy69qTE1zrvbpNgZLybso8jxUEy

/MCv+qYrm/6H7zoB/on3nxIFjEw8V8OvzBQxl/yS4iy/kDqfsLbarj1xJ7yIQOhl9+UD2HKUqkWrbL4m

WWMpcHytseSymqPCN3rCJq+GGmovdoqQzt7kPcTH+C
d1kaNRclypYCuwCXJIGJGRBQJMlxi4PUBrHtcjJ/0tnKGmCyV8S38ohnf0sLFmKoA6SRxoi7u+JL8JXD

v2GqbjyG+na41B3gwKk7aC94ole2gAErYMtTPADO4Hf4SDhX0FaKOg22bhu3f0ev+VTZJiDitzR2uiyp

VAnes2cNF3nec+FHIfd//PSfHwiyz58HFcNNhJU+Bp
o4Quyx+Xa0SFm4PjcvYEx2uej7TksJQf6qeSg62yqjHY/kdn8NVNNRYLNWfUqSajoFJwy7KL/s/lCZxB

ahVVmMiopXjq+kyaWR6tRBZJgfxcSWz8dD54RNTVTLSvv/QEVRIdeVqHVDL10zwAIMMgFj23KUKgDhcw

Z1wcn4BfZTYOnt9RtV+HxaPS7MtOl/SlS2gDN/ywJz
JG74svJbvm9kDIdB7n7pfLSsAeva/3bK5mBva/C7uBEUcWMizNv/O4l0GezT+R7N0fdXquZ5YqeQPDPk

8L0+ZeEiHk5ro+KIvX2hjQU+RGrV+j5Vfwd7/YW6m18eYD+Yahpj3WzvR59LWg2hvxwZXP3pFE5BnE/5

QESDsaRmbVdYX7sDRvzrQvXWfIybta9UpjN4dvBvIV
9XGOjI1zFc5ia+INqeEOow9tyeU54sywvt019BdJbUgaxEwBLHw2F5a05gRitbA+X+l7mLKr0yCbXyxV

MVbfh7RN6WNxSHvJJCrJZCcISdf0QUXQH0ATNtsyZL5ymLjuB1Zm6Fc86Sh4rk8c2ud2Sr+3jxub1v3d

w7koeh0HqHlx+9e/2k8NTnJi3UGptKICL5VRqLq/Uu
ECSKU1EM0GWumxFBXyPGizdlxGdAvawr4NBVXkyLk8YMfwEfPTd030nj7RVdk78zPWsrWRpS90bhHCl0

A85ZFCGlx+tPWiT+zjHFzRlJMqR2yYS4zId3fiYm+5EOmllkBAYS9Y4wmRcAMpwYiz3g9RRMeonlGKXQ

QEvF/1urH9QIYQ5NBp/hDhX3DsUaXjEkrYM6/7Vehk
JueeXOjmQCLQff2SmYRaGnTqxmmWOhzxkEwcCer4wQCrz0VtcKJum2LsMCxVj2P2LcQrXMM32iGF3XtS

KsD/uSEuf0AMsf762GLPoM5yuOEhhHouv3GAStT3NJIFQSbaXWeekJpRGfobBWKs9vWom9dYALaGZQNi

0J0UhHn/q5KP0a63Z/qFfFOoIcKOppH0iOUNSDyKv9
CHFCzwZPeIcutip2mw7K0bgkf618S8+UJqkVpzq5IQytwLtWDGHudUjhwhGirwQ50Q7CttLTctif4Tbj

47qaNOnllLde/WQvaUGZKxf1FWG4+sCWYYlO6YcFhptSMhXo2LpgTbBzfD/+GOyoYUlkaoc4w5o8YP38

svBpLrEcgO5LR+sRWCAhkcwlGNaqVD2Lq4CtDBndHp
PaU7e2qSVR2TNNvmY+AtnQ8oXZzg4ERfui4xRSZzsTUQeTrFqwLPtX5QYlPgZTul6TPAHY5R+uwx/KZ6

erJTTwrXX9OgKEw4dBZe+Nda7dVrTXvfQpwkF50FCHuyzit84mWERUQZUAkChVkk+S8hJOWb6a9Sv1Th

s89RB+oSXJOLr7np1+AzbYfwN656IJAq5u0Q5eLIqf
nKi9NUxlrffzjXqaw3H5dXdI2ABBUwdfFybHjKIf8FgmvomxFn54bMtNobjWgLhB3byu71HH7D3I6v2f

C0iyr23hpfCusIFcAUan6gRmLeabqDwtB2Ds/9XtwCKFkvKRFasBQLea+5jD78FuES+O+3gFaX9/4P9X

wVA9Gsmk5MOXO0rFdzO0sWmgJgLDAyooAeZKhCEw70
aRydOE1G3QyUmbKBTJt66vYEif6NHPEEDUSH06qlHypFaf1t5fhDKRDuAIGBaX5N4fc/+6tpnsU4lz7m

9jBxCPj8gQ+VFxO6I/qpmlu1Wx82+yzFMPyrkKA9RDSiN/t5qA61KlbUyxK+6rzTqEQkp5JAK8sRF1Id

kgD4eNjvOPoE7adIz2vFQmHzzI0HGF6RDzUugRnk0D
TszMAQeEac7aFiYCH/uVI2GYvub+bOW9sMjlV7YGPOB8R3V3giepv4rmqWjbM3qkKG/7d4OTiHTpuA7Q

WJN2/e2KH4Tukkjdr7kIXzozfhE1fyiLbv0hBF0Egg+g4kUpjM9upJMhnUdtdorcrTh0Vi/nNZY0G8eQ

G3COYy465PrOyjjUYgy7OFlWMRWr5XY6FzEVcv9rpi
pyPT+0LFs0jcjnVjbQY9oKHsn/mZ5fFO08FTK4I2WTxZmfuHegfUVhP/4SNpS8G4mj4QSTSv0Fdc5kWh

DyOMOImyEYTaAyE+SCFrEVPv6FueDNZUyAwtUsdTlkjgesaCQGeV6Y4y3yhsMmP+sJuUtz6L50bVEYfp

nsHhkEEVb6dztwFBlhuSsDLQuTuSlRtlUi6yjKkTay
uTUD0IJ6kD4RlW1hTL3R2a7Y/Vr4IBeTEGuiAd1iULq9MNQ8+OUI3+YMeSV4xQhG99nrycS56arl+Te+

6ZCE4UI0ABBLrCt+b6Fivw4tP5fWX/7b4/fgX8yyoAdoEal9hkHxU6616qnHx+Jn02HBVI8gj2Pjtl3E

0gkMUqtCU+NR7UtmIvnJocuDM9bF4VnhW/7Eril+c7
IV8AnHixBM5mrOaIibjMdlpCGr3DMKoDr8DIg/49s+YoyMTZmviY2DIYYKcRU08Tgtqm1wYazrTIqr+2

D6221Vy53f5BvR1BX6pEbRwGsvNzAJ+BjkDVZCZfGOHYEH42w2eD0fss4he7f5DWMe8sETwquj8eT96t

coon/W22DFCedYqScr1AJn0YOMjVxX1sxEhYYX7RmUT3
PHqS8dse4V8VjaCjo9uSbkvRUGqQAcIdA6kWOEMTMajZ5tM1tVMMBNkxKqPwqBCE78x0y49rSvGVOtaI

4+CxRVHOerltPzEYSsBwy/4zqbZqsY+YvQtx37aY+XwqRSAm8ONN5XwWb3wdwrmDoDdqbyFUGJzI25Pz

wH3zjRhlv22Xl7S7S4hLZGl0tliKiObuSoeZPNsEGf
LVg1qDXMD4sls291z2IIL/8vK9nLASpTgL3iE7U6Ov3AGaXMaUVsU8quR1Jj2H8RcjmuHHB2oZDO93PR

xuCJ3u++BxuAqPMGnXnhfH8x1eANBKP/MsbNzfK5HcECu3nhI8dpfbZYY214oHyAWR/tYy4bE4Nrs3vQ

F9vuPbBqwB5aZJjlwdTrChmRbnUJqYxxn6sHrx9V76
NYD0SzMRk1UnPX8tXvT/Zm2zCQvUqT20j6SMXqO9Q8PbnKteRbF27hoY6UwHm/nw9XcqkHNXUbEq3dli

7WUb5PIgRAlv4yDS5wr2iMJuT04em/K4DD1XQHirzKrvTcfATulPd0ivtWJWiZQq0lzqo2FDkVgRAghq

mdQwMt6LZdsfZMFfv6ajTRUWxY5Uu0Jd1eRHKawzdI
mVwM8rWI016jVu/2Sl0TYkFLl+dG0/OAmL1L9S6ZoZyuQ9aIEk4ubAsst+4ToeMnT/7UDOQYZX9Z14VL

7L9tZFWYHEytQ/ovIKEDAPpCWcUhDT9Xjjs2HOrFM0szbnOsF3vTFJ7SaQVQ0vZ/UAWa67nZxJKTbf0o

im2/O7hUgZkbiC/eGvSvLM3C4Rth1vmIOCS/TnKBuA
hbgRzM1kE4ml5B9Ooz/flJE3PtqOofQmhqwaVtnduFkPjj1Cp3xy8Q5yWJvDK/zIwloQ8vTP55ONa+g7

6721nLwR7PcfoQqkoC95SaE8JCmpaw/DmPDgz5R1twBDy2ahcDMS9MkSLWbzJBUu0Ey/UQ526L+jp0XD

j/SVhEKK0P796xQsHfNCUU2OGblLa33zgvUyptHaoV
E3a1CewquaogN8UvpJjQ90A1dk5lHbI0QagQvGKy972DQ1q/9D15qqe+xvvk+fR4pFSyOQRqaHrlqaCf

5JZZP8u6Z2MfnBNsmBR80+qa75x8jBxIiXp5/k0BK6bsbE/EsQHrg1bSMGRp9KNV6+S4iwqINlanaBem

dLelhvaJ8cAsXGn0UozwwBoTPC9rBm46ZwBFY01nQh
lTPHijUvddut1qXJgD2BmbC3zhRJhBFp3PLMlltAhKLPLUAZH5qE1CV4vSQWDG0ufLcaL0Qe4VYFANQd

oM6yX2pa6ZVSQcnXst9un3uoDtNiG2pUTMtqNwquNw9dVolhh2ajZldB/Ny6rOilo6JAOOuib9AB8n67

mU9nc6uisSbmPI2RoyMZASG6uO5TlWh0Co9W7o8elU
3sBBepkK6ozmJQ06D/ngAlA92wBzf+G7M7VhkCaH7cNfCWTmaKqL0pjTUTLOopZdK1WLxkTw19/QANLu

lnSyDiOJHOuuUNpRpG6NzRlI6mdjw7S3A/10hS42Kxexs9pTpJX+KdJbUjpsOEEQTiQn9l2v9A5FGkH5

3bgVfS6cQNpE0OTUsstLMN/buhlamhjOhBJMjSXSvi
q25aByxEOa/rE/RgfqgwdZufy5pJIfILVcmFjglFpPCoaXBSXXYWc5/7NmaleWMq/nZjO0FF8ND3ZQOj

aynbvb35GRhJHr3ilf/Ah1LXlkdjgEC79Ad6t/pwSM+JpL2HGFjkjCKjwP2tfGrkAHNSg+/GZvb/XuqT

1jckbHo0Q1uKQ9LYJ5h7e2byxHMxHkTXKoc1rHO+jH
OXjj0kfjU7ePE/Pz/4A2XAbkEo9eAjze7/ysn9OlU8/klVw0edK3ZmxL7QMbJEuW2B1v9e8B/q3Gm20h

1FC6bvEUMCs3fBXBxDTxCqBpEmCERt/2p9WGq6aZobCZC8ZsBw+F8wCgXkesTUzPgpeZzc4VbnH9F4We

Q46ZfC87BAo7GewCmouO7HRVGMuidUi12OxGdpabgo
1Fs7FbEK8m3z4G8Xcc9KZvOiP/YITH/6BSLIYN5O/AvLKblG5Pl/8kbgxoZ1zCE5i8QRa+E0LZS9Ea1z

yzY+xA9LDhjziOKrZ0Je/w/tnWb3Lp87F611tpBvDAICAnkhYmMXajU/i0Z0E6IrlpZMNInbam1mvg8e

ltfzatAJVZAVctw/8Z3QlZvoJgjYvXlcMRRwStXMpJ
+i4KI0sKI+FPWBjweKlbFQwVBNH9WmaPCZacp5DB/UL+Lrlh/A49BNejI5au/kLtiY2rYYoOiuCU5pb8

TLaljExxl/AWoOfujzPlyq/b7Ddw33XMC8c9gx/YuDIRGrFu0hEN8hQv5JlAh1tmNN3kZmGrW1Fj05SC

CFWsaf6t7xmr4ql92O/wX8evpjDyjrUrvUkSkvY/p+
nGIW4B/q1MuRFuMlVEPw7j58mMaLsKT2Lq5x1BEa8O8Rttv9T4TV0/HSHJKxgOfW7WiM6bFtaurtnsYI

nMn8TDY2koPaDYeo1N76prFOueUKaG2wE5YBv0hNHXj0DZPIXt2KZOOHVWB2MH6Cf82bge5xbqPhlnZ+

ZC4+aKKEQuTfMWcBfL+6ox+uj1fVkv65p4fueXFeo4
fl2uxX0gyvFvC5EnTyH3PDYObLsI9Qep0PDJH9Fi2ZPE4Fqxfu3Dno4H2uA6sAB+pUbGHaCHd25FcoBv

naam8Lit6chIq1KPVRAUf48Vj7sQVpe3HPd2rBJr0qrG8BRLoKdFVTr8vncxAOufFAISGymBIiPSsBDB

hm2F33lj6Kv9Qv2y+P490QTsr1s9mujH4E1yZbthTB
vZDqatTOX/1VSvf/+mdytmNB7W7udjRp2/ses5RIM8G0lk/bncZfgBDbrEp/RGfzAHpYowMR1XXuNatz

cDQoqcG9un1w1HHbyt7PbXB++UeuI7NOPEnh59cts7lO4kr5bqiFBQLToGZNSF4gBYNOwwJjA7X/AUSA

OZnwjykdUuOLM7Q9vWwD3RAGN53iHDg5DWJ0CtstQq
nHp5fGefKVrQ/k6BSoprhb6qbpa1IRA85bVa4EckhDdG74mYRXYorjNcWeG4d+S/cu4PE34teRrob0jq

yHbtwXMRmVVfdXJzfNTE7G5gVYqxqamvChU3zx1ZdS4c8rBDJSAUsBjachv9dA6ZT697pCCPwJvhSPOq

6hM04oLDcDM8Cw2y5r168dpWfRok5nczq4uDCs5mgz
46A6lXbS9WYIwYRRelx56dPHgy9JzT0+IB6nzlK8eHWiiSmnRLWGoN5JpJEQkrvXpNPGd49NvOWZAjpk

taedoAcmykJHUZu+eUV6VD+4dLMgMdLa5GsuvoNWbdCWdVVhfClqMOx0EBLF6AoGk25zSLwM82DWdaXd

quit3wXUBL0GrUBtDrCrF45Dyf77clBSwlCp0FhFC/
KbjOQh4FCoFOOXVtOvonhsu845jitKYIWksauHEXwAL4pmBwLNcvrOuFHyDEi81WWxFPaRNbvQHj0iBp

+KVt9jAdJmxW5kLGE44Cw/8zTMyeypSTJEgTH036Bm4e6Boh99wCP17yHkTeC7j3ql9e+0hu9Epfg23C

372qIEwkuvT38MKdTASm2c+v192SfSMOSlqjvEaees
nmdgl5/uqETyCRejrL+7i7Ty25NoSLNvt6hdezXoAnPG1moPXNOiFMT6m0+knXHUOpkhNDU/L84NxFvH

uBiqjXV2/BE4wcVhda5Rn1kt0TaJWL2mcfC8jjtfDcTPX/qXE47PSLR8eGBmMS6lbt+OnxdJEgmiQxnM

0FnovR/WKDtYqjiVq0AJ0foZPv58seG8T+1pj1+zHJ
rvGeJb2zIbqIZT0rEABTB273o3GI1ShiseePXtCSTqzsxEWoL0/tFRzrQc4tp0wq2fQmOYUEwvf+GDcE

FE+Elh4KSRXM6dfxeVs6Z3zXIxckws8lgtWP1scibB0K+rpeZdA7iMD0Q0ldo8KtHaF95yXveebtzKP7

iaBlGB4Oo+V5NnFSX4D1OatYtlj95xvgk2/0y/Lkb3
4PGKwz4jfNEtGnSd7BK4J8TlZhVTZQwJBYwbrc+73ffTgcgOGle5QzgQ1P63GPh+tzAsQvU12bbQHm9b

/xA3E2nX0edKtc9ilqBvsoncRbkXX6lp+81ZNQBY08XyDQjkRefgWRFrm5KQH8ALfllxCeMJNvRakxQP

axN0/XB0jGBy+v31kXOV6xhuhB++FF0l591nVDOj7M
m/3EcoAvyRkgwW343jqzZwVoi8IzgrxgXtk+AKGCT2wmj6t65WP4fHss83BrdiY8CMlJL559U9d3o8LG

46vcqtVu7IA+AJJJXkeEzFLjYyewPMIhyta8DIFLVragvrWfb0x1m2BMTwkXSAClXEK7MzjmMzTB5OaY

8arjf781QRiDuujNgFSUMmMPIIGmrnNa0dhrbPPYLK
IYyzxtola6POgHQJ5ZX92pbBwCXPUPowWgpL9ks1OxmDlE+IyDhzdRYYqAFRd+1THUK7P0IiXD6mHBsE

v6LuU/FX9LQ3JysS8Xjxpj0yoVChtb6INNw0yizdI+8jsSC2wybg4YzzK+JuEFqm/NMc3+DAS00+E2jF

Kq9Wl6l/4ttrpUWTEdV8oWOJmgWh8h9G/3tJTrEcjd
cl5+UlE95s+KXi6UfIPHftndKlqNpM1YpTp/LRYhehx3BC0II788TnFsdcXd1ObyRNk1Za3U5OWWzWmE

EuRIpmNZXZUcTwpDfj+xPR/hujy8bsCehxozopXjU+6VbTO7v0KNHGL1KgX6ZDdOZMEv9fKCuoUf0wey

FztZhXwQMkhPqqyS9hVA3DIdSy7RlxQb/e2XXRLnPj
yQY6RoP+dYPdG8TtLi0LUdE1vxbWRLpLtld3Wce49LZC49x4oTz2dBj6QznCAJo40pgpW7DLtFCVHuDW

SpYpkCk5YgmOsbLXDsOWDzTwx1bUl3IzF53U5ayGG7vuAwpKY9tx0R3gE3lqcS0g0lKoD5rGMHXUqrWv

KG8a7VpqKHJJwGB91e6VhWhIlu7b/AophtZCX08C/i
dVQ6vRxPgRIueKZNLoNNLx0dyNdBbJD2rKeBweHOAqcapfKjjeH9RIQiCjNIqQOiR5sN8gbHDfz33imQ

AFqxWv9QTHYo43A3aFTyDF5dUpnYuTYKosiQgt0OYldDzboiw4q/zXBnoi/8pI30bu7jDZ7rwWIhHqxX

/XJUQTojypmDg3xDSu6nu+qpDzzz6XmiNUL8zfJ3+/
SEoyePo4oEY5tZVPfztY5PtXq7rGNIBkNDHRZKx7RAcuUPIUL7KvW2r9RE5bkp06TMJ6sULpQx2ULN97

U7qXeYQ0FXDyX9+6XpHf5LOI5SmR0UvyXsgXO8sCL3C+6Oyrd1S23r23nvf/f6S2eukHY9aI3vIDlWsl

C5eRQu11RL7G0/py49lz0o3cBTygB06ZAfJz4EyCck
5qFJfsavCQwi8BQJvooLqa+yuehSEhVFiQZZuwAlLRxXS8FLafbxquWRZhkQVmYmXCDIX1OQjbk7EoJb

Xy9nr7sUC5+gg0BkfqZ7XY8fE3PQjn8MxKp71o0upjpkt6vR2p5duuV6Ld0DaS4kUt0U633zunxYb5IA

nmJMILoNLdivDYpfmYAbazItUOzvG2rz4Lng95b2n4
jtEUi1CY4FAsGWod7dzj1QMJg3+d3DDzMheWBbl9nRDhixstNBC/gAXqj9kpvorazzufffjuiNrx1nBF

34R1vrvBLRIXQcGz6vrr0/llqwj40DvSbs0HjHOmlUW9vETxYAfG0O1lqTV5s2/JNzalRIF/BzDm7B7z

FxPPAnj6m2H9I3vywOl4d5X0Wvp7B115UZMov0d+vm
eUrPdpvcQ9/B9KpAz33bcQzYD3SIO219qz/2x4FSaGI1V+/UwI76Rt82Oy4Jn7atXpBactJfsYrPM3rB

flsi94gjbXS4wBbBlBouGu2yHvqtWUC+rcV90PlCeGbSpqN3aw5U9WmjrhFx/mLsSMNdCD9xWIn+Gsfd

TnkdvbCYVAgUEfrcre4Di5nCPhamp0B6L1uzfABDG3
CWWRrg+M4pfdDbKI7dZiZfQTMakrVw+AD6qBmVng1qIxRaX0rntbS/ddn1De6XpOBCCTOoi75yK9/5LS

Tss9YNCwv1+VV3oBOcNAwjlmeOLOFb0CPb5GQe0S9VmYUkIXOnVghj9rJ4Sk6Hn7hO4M2KNhCMDVJZR6

+G2MU2RMTbAbFegzxUz8Pq0YiBFsi8FFPfs5yxc5OD
t2hUv/Jf35iJ6MxWVpF/YOZFmoSWeBq+oBMEkhRPsHnkg5dkn+Xqw0vS7DBXwJKp49W+KK4+BV1lXVCi

+8rvuIniqgruc7SZCfCWe2ftv+3n3yiHXU2HUk/rc1vEQdnAjsHqKr4fP487WHwEs5VbhKfjfDuhUaDe

Z+4L00SbjcB+qpDnJi+ZbMIt2XuYCFaseTh+HV9QbP
ppthMLoF/quZFtKZku4dMdtLLl6q84SNiiUXabC7xk0pcudSSwHreF4SOPSJbG1yutcr4VmSwssj3aXr

cgl9Uq6RfNbRZK5WW5KVbiiGSUBpv6WgX0l6npPqz9jawb/A4ZmZ+PnpeOIB54BofDafplOSK1RoO8yW

t6Ly1qAkDvlBOwtOoIg241lB9uGwvL3T2V1YDrl6WH
V4bdz+Ysg1scjJ/UL5FzJyuCA8UzhK453UDTamKmzDWrRWHoGPXLrQZBceZW/WVqAons91UhXK6WaMCe

eFvV+uUmoWSpKVbPm/YIefRehza1iIpz8LTdlnD4a9cydxX234W54h+gKq54vMRSNU+rpJQ6ZBBiDsFB

vF07mOJ32qKcPJ93Zu4JXNMmwab8Tglr4naCQVnrkg
QN+4Ajkb9t3FrK/jiPk5nRVVU49rW/d8TD8taGEMtK3vO/USpdeGp3tF2JQ7gkpsbM5d5X95Nd31B1Dd

U+DWSB8QJZgJpCoFjMdIq4EHaLJNY3RcNuzGrBh1JUS3W4f2tAoZChix8rLAAK0IYEbHmjMhj93X90bd

1cKpW7dFIWqbsJS0PVHeds4OaqAzYMnqAtTwkIv7Yu
mcNqN5tFm3zdTqyizikQ00qhyG/rzPiA0IMx4hBoDkn1Ad8e1mu5o29+c8RFULDPOFkMTFXsO9I5ZIC1

Y4h6n57zsr/zj/m32vaww4ixoNVG1+ob0d4QThBhk7woFEa1RJEOYleqEcH/ihq3yqUjaCD98h5hUuN7

cBaKqKUbFZlCOFIaJ3yWQdOKsICPCcXVMw48ewVBXE
zJZJuUO4jiaY83nxRRG8/QCC7+yEt2F9DS8OhoXE9HscUspVSDtMpv1xbALPFu3/MpASH9hIjy/nVKKj

A0TatmzDOHlTFBYyZjnrauGjz6gFxTx1y5CBemnBUJD19z8HGs/WqkHT6nanZn10N03FtHXG8uKVoQR7

91LFLnIj8z7+jvw3mjSTYwv2q28lf0NFjpZqO8HcjJ
xkenrejWlyLAuBlJ3DgV45Hqz/nv+e/jm//iUmhV8q4nEhtlt5UX3sFUTIOiBZNdAfeM9sLfeJH6kUvN

i4lM6ZocvLZhW7YcC7x+uBLLPKH3ddT3jQR/ErncDU9OVZvHaRWC+qx57OICbeibJKE/Dye1YW8rocVr

3soKKJgca3E8ZTwc7Sek6Pyhq2N2QH3Imum9NQS4yU
sQiu7KEIIRzcz8A5vr2W8R9WubUUj2HeSfFqf5mUII1b7jkDlLtnNJNexlb4qWPZcI8wtP3yIEkDXwwN

7jI2wYfB8t+f9LqkhyWUVt+a8EzinHbOU8+vMrXnohp6pNb7XqYtw2oNDUiLVs3mw9qIC0EbHMREb7p0

pHcRZUpdX5Rac4wBOAlBJsLr/nk1u7fM3yaVyk4vnl
L6B8PmeNKLmodNE8XzlMZF97VWlvdibs3IfTC1YxUw71gE9TM3dd3EwjzZHV1nJwy36VqljQbWuWjBAX

CHynPVF+HxywdxKAvCCCqxfeCDewMXuc5fzbqSt6I8PXq7/7AKvHB0fj1GZpVmsd7FH72yh/Z8onSFFu

ehkS3QCkhswMbrbbZd2Krr9rjrMb6ydhJaO4DvMxAy
EkrExYWWCYozw0PeAx2Ops4HXhOMsUSamiyJvJf8JKTFb2sj90Qm8NlUTS22hECSMFdLwKPyJNOE5V3B

iDnhCRcQ2qMBodb68NvpCHi40FMIG3nTnx4Bs3iLTxiqYZDtQRHFWaR42bigEFwHaac6ACly3ykoLS4O

6TH8P8/iMXY0Fd4KGj9gvY3O4v0+KiPIMtUo3VpF+T
zzty/jFiiFi2nhWVL6qGdgHzhvxAp9QIWa/WWyqEeW12Wdc5CzwFoGwDh6+I24WyCjUNnzV4xlut4qdt

nNrR7uMO/fz1cRKE4KiHH0WZxjQ7hX3aXxwaG7U/GisT2ne4xw0oN5/qXDvFvBDnORbjB4x4JVYdczj+

QnrGbud6fNfyGVYUstzbADTvHTis2a6eHP9P8IJwZF
2NxBRkd9F6YJcNYHTk/xdd5vO32Ruj7lf75a/shRPnZviV2ICvi8RhJJp0q2i3u3I9HsRS6l2EMqRUqn

42jfSkYYcWS5RxoV9KDzC76xuQbTcXSMgWz8ZZtFXp1Ow/koZISC1vjXPpfcX1yTjhD4x7jm027iOQ1G

JwuJgeA5GzqHnxRe1MvPeBpZEmh7TlfX70tBcxGNUe
61OXzto3nKSekqvVS0cfCalp+goi5qD2xTyXl317A22oiiPuWGcFdFcZgxlftxRThs4At/oaJvOaj2OO

/y36t+jfon+L/w54vi01//P00Ox8J+JKqwhNlp+CnNDG1S6jNoDwU+GJfP8/6jr0FqwvdDof/9fv3P+8

mPb/JTV8YpJkm5ZPFSrfl5PlTOksaoi7+jPFKaH3aC
6QqhpdojNOTaItgUuwJe8HRcAqHs0KQ7+/HBZKYLvuGTeuETd0N/VJ5AXplai+LxkUOZlK51xHvv4IxT

8ZzaWfVpK0vcKcwib8HofVrpDuX3fs/eONCT/wyo1n/oba/Z31SOE9s8fGckU5MDqMvPE+NMq77iuZYj

9Yn+EZjJn6MVxEdVfzzhhsZ9WaCxmk/zXRAy1VLCrA
hFqnmv/dNOpOe1tpC4JEn7hLpX2KxmWLbjNreDerJ2B/FkrnIhzpu0wfRwtpv2qlm9dgUYamr7ZsaGrZ

JjoaWNscGMjFcmfujlbkSggy2A1u5KkBrTbquHQHkQtPFp0CcuiplBIl4hUbKK3EwN0y1efzg3Rn0IOo

lWCiIxpbUAuw0c+6XfkyFt936t6x6ug/x5yjMs5gju
1188eu7sJtAf9+xYgBBAKxiBeQjvczlpYqDgCgPgQDyfSOdxO0hg54ftKsO+ThDkv0G9HYzj0/6uX9v/

Lw6UkLy3glpcW3Erc8XOIgyq9MZVKgC4jSymTzv0qaAZX/Q6CJoLJ6wtc9ssjCf7aD8vkRtS6h/dqxq8

aJURxVuuT/t3CV7aqL/jtK8uCFp8KSZpS5Ixk6TMSP
HKM3Rur9/b4fy8Oyk/mXZ1ialWUJW15kFrfgmtFmz8W9snJljLvDKzyJVnHpA978U2bCNjm5/VyysTYI

pE4gTrJ0rF1au/6fX4Ohylim435sMw1RRtWrUAi2iu/vH6fVWm2K4Rf/53CNCV8/Jsnss3IDxXoltC9H

jjKP4mskaDt0HIaGlm+S1GC6E7vuBcQO+z4W0F5w7O
nPow3gDyqw2MUXaqlNP8sE1UPP3AW6K6kL8t3jKPPhYsciZjlEWYJXjeAD/G//ah2vbDCy6+NhL+ti+C

NIdYX2qk52SusVfGg5Ixc8Pc0oR4kB9ELWFHblMcs57Ze2SI9rWv8/CqAnrrfLtHht447OrpczLnzoh8

TCk5C0le57qEVOZs0v6XgxSdOSnx/wn37kPhZlAb9I
fWAs1wOHpuxX3z+iAw0wQqIuA83DTuTlJHRCv+/RyUeX9v/YMeBRCEdi46EkbUrv8VU1fRvkS8nojDr/

kqUEJdSKAJt2r5+8/7bP3T4myvERotZ4bNhfY4g8IBdUpuwxIBRZd0NFseC/MFV5HbBwYTl40IQUmbzj

ixoFHlp9VnGLIC62zpQf4U6V8E/2UKllA9UFoZNpXk
M1K37EVEnWn6XI617IwDWxogb+svdtVMHa67PyCV7Yp5uDjijXSbzMfbnmh5kItkoy2+KgCG4Cid2Lcm

Q4qJX/Io7/lDgBIS5xbcsIncxsN0LU+N8mF3qN64mINbuZgHYuNwQwYLykpyq5xcb+9aYJa+RrIr63Qi

ooCnLoH6vGas135VlM9s56CL+62Y3oeCUINxINLOMr
oSWg7ImfrsGHI+LyxOOpTQo3LmkweUly+ta2z7T2kUkQhsTzAMUHQBXN/Aj/NhvYDz602kcevXDQRm+h

2dhMEi0DsHDkbBCE8gmQl4NTr2cWOo7mo1s49ri15+Lz65xqubVMm9UkjyxfEzNhczGpII2oHyYrnhqF

WOXm8uA+yl/91JVOxzCQlBFrj8wGNNTgPqS4LDBlpa
RbzZ4TB3DOIyWjwXZ47yZa/u1HeHEZDmGxyfhuvT3vozIV9KKGcF5o2Y65CfwHafN7B9kGqMHIS/oMbi

NrRlMK/P0hUAyOwn0hv/YK6IU0lssP+E113X8/yI7sRVBUPGjSLlonjS527gSyBrO6GzbrtFj1UUN6PX

K2Nwc1M/K+fez2GyZSoM1vaoiw7YGxJ+2FQEJBX4oi
SwDcGtCqy37/g5r8PYZSMq5h1hz8/9K2T9IIuO3EDz1fSXCCLsdqGVJeWc402vqt5dg+Sn14BfQxKlzX

M8ZXPX0bbmx4KsgV/am8c8w1xMXCUZcl3furSaT24Z8nO2AygmRgGH4oqJe3uCv8TX+tV0iyz6V+xgIL

TdaT8rgI636ugxb+O/9uaR0ZdwXmfPWkj0rdpriJQS
yjX/bkC9z1bKI5l5iCTfQDjF29k+MjaLfWvi5M1RH0U/tElnDj6HZF0ddwHs/Ah7KPgCaSZL4vkH7lyN

2Z3Mr0jSEappp/Sd6HjN66O4Mw/zdQ1/t6ka8fetDHKsJDQzGigOBJ4mxLa247Af+YKxTpYRKYR6HBLn

soozH0Ak/y5p7cn98wuLtTAVCM/9VH628plLfXsDIW
0k1bWzR7F8xAmthdAtFvAn/5G9hukDqMRw+C/z4YYxUIfteMVQ0Jw/yjTjLdalO5Jpl+l1H+e6WXxHv7

5kyrgFEbHxtuWK79Po0bNpKeYYvEJkHW33NyC3PPdX4u9/X+gpcGI5rW4f+qlDkQAM7nQG9Y2JgBcmMm

s3VyxqZHwpQP3lcOBHTew7D0T00yZUnie0LqV8O754
hh2zvd/DW9P2vXVZhPPy6PU9SdnkoMOyIukuGaKYU/yy2KFGnNPf+ZIhlwbPJGtBtXKsV0IWnbOx6mxq

a1gqgk3lVSxzJ07XHZvanhzewsOFwzNnV5IsG0/sWU74+9Wh9dVYS4Q6OIItTlAB/rnGO2Q/LK0id/Mq

W/qWXDgGfF68FGpjRt5i0XzeXRNFfuCOsK9zwa0WXi
yFeL08mnVEaFO4dbL/s1o7P6icj1sWPrd3u/xgzlhN5y0RBwGCiiGE5Lrj38doX9fi6c9bNp4zmaT+sc

zwVYIVLOpb+fOgWNw70eFM8zDwNdnzhDJjiqRfzbF21fKos2OuySsP/VAQGanEuIEpO50TOZAGlH+bTd

RtlM/1AeOl0PCXYpWxT9/B5OLprnJJ0jsar2uXC/zF
TId8B/QizpyM+tsfdbEdMX/mTA+h3Jaex5w7UskPCCha/31jGL256BsTYR23u0/3i9OeZzGIyYqLuO8a

jyFJIAROMtEssXa3RyRmSRmLSzWNL89tGGyJKRbt5byHwNq6vMLNpmUBkbnRSn70QB+u6yQ6xrCCnW/A

MowdoyCocq5gs8hy0MGzwta3b7WddPgfMqYHvxjME2
nx9d2ECK6D/ltn9EwpypkD2JTLcDXvhStpQO0T4z44093rz4gsU6afMNBQHnHjRVtl8YzRsu8B2MKaIo

TmovDYDyH/gQHYJiREZEOcbOTZPF5UQuIABx8MeGT8lzUo7snpptqROORgUp3pFfY48lz5+e2Xdv/Munoz

fBRJVLyprwO849yu6eK1q8QQdNyHZ+oew7ctmO71gs
69BMknX7AqcMex3Zr1A1yDFSGQPPtKXOLOe/5pU7p9BujqUGgfCKl0Nz/Xq8tmRfj/S9kVFpa1tXFpsq

PIsJtd/xvbCe7Gq6POehr6I3MFNAi/3mVBBorf/E0tIS+cV4VGgIp/5KB3No1Gsmk1DbZygp601oPPz5

iyGX0Md1QlqcTOfCpqi2/qrFHC4kvoLR7V29vTLsht
Z9hL+IJk62y3TrKUz2KJFDZMeMRbhANadUS9bvU288Sdjx4lgsJ1IUj8QQfamC9ZG/kX/82Ne6JdLbRf

wAkvh6NluzaChUTzOEFnXJC/c7+p1/gKfyFES+Xf0/497X/PtP+Rx78DmmOb8coRBA1HB1Q18gnfZb2H

IHOVTaYQF8FwjMc88nj46pLTtlwS0GU96PpfYC6G0p
bWYHa9w7dbgYu6XbDRLuYLH/5goDWfRorMcxyfBu2Xhx22pWGeZyzFggYAgjD+dXGZZBN75cvuURKCcZ

CYYvi0PiqqWJYrDsdz7iZm22A44+qTL32EnOMi0DXx2UWmyRZDnVQVCdSHAb2F3Nx2ofui1SO4TMwtbR

PV7MRuLfq0JSpjJK41alLqyCy5VNMTY/D+Mft6VPX2
t3PVKTklCfd5okXIpdx/4st2AZkEt5DGMOaSe255qyQupK2/QJzNDDFYxMxKiKxKHHTFbclr4OvKtdtE

PiAmT3Rc5v2iXElkT4a3+y46Z/8Tysto1GlvnGgY/EqW8gNTDf5CuNoelQF3JYTzeZ2dvFH/v9PoXe6c

tg7+q1TU/31XaaVGo4XZYPX3aUfTyDVoBVzT4HrX1g
6+kM0EQBW1zFOlWkvZadpiWCs+zBLfwe2pgXE/EyZjWhyqM3ZnhzSwFgyaL7+Ju4OM0ydmVlIsRH1Q4Z

/4Oy8V9qs/OcsB7AGD3MvSFdXt+hGrzuhRHx3Yrqp49s63aA57t3fs0Ut6btYxp1RpqB0t+NV4bIRXe3

7n6erj5fSmVdNzi52RnYzT7rgVFFyKt5GppD5HRUTl
dx6x91lZUP3EojBK8xN+xJrla/fcq8811zXKclaE5TC7XQqaPHzGc9EHVyeG2lHUFR43fTmMqF3mfPv8

gtI87qJkDpncyHdLsPrwO614m+VFtM26j3bxycQHEbU7/DXlRJiLQpfvyYow7nnVJvvGhrJMGDMNi1pM

VDltIB1/sAB/zEkgSc+goIR1V6+1rQzbRSeH901cHO
0OfW6nuAeg4nXV5TSYDgDiMeY8fWeq4wW/lchcSa0l5bcVGz9D6HXs4d6AGjD1K6VrnT/nYhb4Lj0Rih

7Up5UkwO9QTFYdJfghd8kqppGKI9I4iRCtkgK5ihzvRZ6tNohhk21wstsSfK+V0dxPZgLjgyEHlJSwby

caHpdgVfZw3NBBri0saB83IJiLua+Lk000HURTrQLW
7T9832N2UdIpq8ov4AAgsY0I+73J0PqlR+wdB3SQIv55itLXiz7f+s72jea0TEyITX9BTGEVe79dtV/3

6J1hqUK7kGE6BCQpibtG9s4N2L7r4IVHZn7YGvgPDl3HBz5zwOHBF5QlAy2LacXQHV2vqhvxhGt7saJG

gTjeLnZAwsdyT3uENCeAxHOwBFiP3aM+BWTPIHGuMb
6dn6LBgv0RsmU6XHo8+abz+2BE7Zxa+kD1KQjvUe7enChnO8hUxL29RvU3B0LbHLXJemXbCPgGzzTSRu

RtxA+UBHavF0FwjrhmPg7hv1TnCJ3JzmOEu27rSiQhPf5kHOpERyjyyiBXmVBHA5RTrj/pDJisjp+Ppt

E/owe9wDr0/FlFdd+m4jE8gDlnTnnLqIahLyYK9/y7
31Keq/VLcwo7r/RMLwkG8SZmctI5Oaz4D0DxoJn6iCpdI+CBzhwMthSmJHq10r+gkZx6+BoE3R2JXGu8

1z10yeat8Ne4V0GmmJ3YBxdRnXFjKlQyv0zq6s9pA/b5fvI9jL8LMHfKzmvV02NZf3XTAtcA+DgpVlr/

JaoZ9JAlPyVVEsU+agdgH3d+GNo03Faj3rHFRE/8ES
KktM6e+o1WbNWa7a9ce/E+Wmnng05czbVE88G0WpyBwN4Nx4GXKwZnALpJni5qC6KQ3kQXHJ4td1MY6X

vT/XzkeBy/xQzgcpeVeXj09mmOW2EvREbVrx1/QSD5f5DHMv+zTvDmRFfK4eDCeX3S8DDwsClFceyavB

rHPvjgO78ps0RcHqDfcjxiKjCdYs547KFawBR6oSwh
8JaixN9+Iy5iQ/qZ1h4oxLhmMnyRo3BIVJu3bkN/ZrMKTo89vcgCaNU52PcoFf6+2iZifoM9Mc7TA8lN

sBqJGwe2xVWCoI9kI29IgQ+J3HrHXKkYm8vYxzIhXBcXqB5bOB/7ldlFTGGPwFLE54wqG92nkm/FqFrK

cKh8tFQW8LPAIGLX8iNHRKk48iR/Aqk/DKRtNOYW7N
XyEJDdLsdfafdqR0rxLBsNIS0N+NrygjO5fc7DTeHd/0jIqJ0fvj+K4TdjaCIj+0xoZkJ7O25cgMxWd/

kJwBOrux9KMqR08MenptjhFX+WQj3EdehLf/eEKCOuZC0p8iAyLw6slCtwzzp2Edf1B2EoxWz9N+/6U2

HPRD7IQighVhABpX+q2IcMKEW1mc/PVQDrF1ha7qr9
Kp8WwQfDwRh5RD02xX6S0DCX6oKGNioOBMAc318+NSeW+w0R1Oi6UoNyNF45h148Ji4VdmbVMykBj2mu

Sk1gvZMvBGjFO852EWw1AY7MiXna1vint14FR+RSG/9UL4pqPLy89gV7GmJucAmWLvDt8XYcNzyd28Yh

ArQuDTM/1EvGz43jAPVoevtcv/p8qXCLH0lfYGSseM
yEIPf2H1uL0DE/kLTPc+xE6NDI1WsQdLoJzNEoCrEv2n8lmj8279InPNFo6zFmmHOCWcVvTHbdwAURm6

wNvZFUH7Vy5/WtiCfDizujpZebmzmFJI3HKgH5xgcX8EHcCPtz1LoAQn8TXviQ5Gw1VgOK0Fr1ogYWAK

kpflrAS5f5kACxCAqVPmudciakEZH0T5tf33cjpgSq
tFhJKuswZ0oAPWvpk8lbP5kNkjXTpZ/f7pRqYDAadksexxeZq1jHVDT0LSK0dwkgnzUS5zxJ437qpH7P

JWFnwcO60/p1MZOjiz0Xn7AkziMisQCpNg9CZ9adu54Slmdac0N/VXP2qHqlx4K3S8KtI5UMNOzreOUI

iuvF84ABet1QgljpURZj/GMpTBqTi4inU6cFd+9gZ3
AI8UOt2HJ8VN0VsraksmhXzv4BhgOljfdDZz4/JcgB0UEu+AkE1J7L/0mHdswdOBR/VV3nLK9p6e//xF

av8FF1WXQKNdNktyRdpAHX2uq6lgjqk6Uo7JdbmlCm3KZt0CXSKavsWrqpwCbVVry+CSFTl+tRQpbfRd

5pR4oHa56JiAvpC8E0Lth3THmxiscp7niai52m4qvg
z82UlgMMm4XIOZ8245GdQBb/KFsD//bhyMcJQERqJr2nb3WQiTg9nI1AyIUrM4n6TOKErqzCs4xNmgqV

jFuvTxFdaxzoik2rLFsWMx4kPkn9KPG3CoP91AWyVEowNfDzwCfa25waxRVMEfrbQXAMXrk/LevqbiPf

0ZJrHjaYR9p6PgoiqhgUFIV/1XNpVm5jNb+YqTbWEU
RT+cziiAhBtjIYcS1s100IYCsK7CDdq7uMG2/DeuY1QJpE2saRtsiLN8/pffHVGxg4umy2TZk16dKWHn

ATMj0yG99x1FdwXlCoQn+++07lBYWdI710zsKuKEpNhWmObh3ieKj9XreHrDrl364+PpB8wCIpD2CARM

Pu4GpJyHdi8pToF1+pJuLXFXKhgtfP9Wii1gNc2+Go
u7p+NJFUPJXjtRCUb0Vv2DoFTOm0aTrE3YLKqWdVNmogckz9gsCOb+Ktv1mcciPp13eldNK+pYvmSk8d

jceOBTvScJXHR8S/livypM+anQSJ4DZBAYO6j3adpsXiDfIA7F18UxN4cI0YlGTx2tihrkAWSv6LTSEy

Eq9IaZy/oujVhGuJTmYnK2vlAAzFS7+YIsAM0iLX7t
0RCxjCScToAoGVGcsgRGHSiMa21a9iI5Htgjmmqim6HLQmKSOG/tOpXqUF18O4itpsNiKcj9140pOSLi

/jbYV7+lbWekGX3s6hajQdxq1MC3jrVWxwamyrDhPxduxhPOyZGLyIj4Gp1JF+p8yETpq6x9N9hl+b4x

t8psXDjQ5fZmub2gr55hvoy40u5LiJjB3WL2/5a+6K
tuv72AYK7goWJio4sawh1Sibl7faw/VyTiMYAnnLRrDftFDtM1G6QBIp64Jjw+G8lHGvKM1F0J7oNU8H

auShvQniEt0TMHkLfraEfIYIp3k5XARouv5uPMpVM1dR4E/+gNBqRazQUtR2w6KwpUkOhFgima1Hfawe

p/upCOIab2mP5s8/BfKFBWU/B/OwfgvgD/cOLAk6PI
Tx3AD7oYi7ycxPiWokX03gfD3SMvbOHtOL0XkPmClDMdO/I4cgk40w89Ya2ALeqQgAmLVjICH4mg67Hn

1OWk9yYLSWlm73yyC9Nh0vd2Boa9Nd21g3IHD6bm9WX3Xez+A2W2vTd0/FCxif6p8+bXYU8O79mbQTSo

pzIwiEWQW3PB3DLFbMZ8zMco2rCmrKtxKE4lIaDnfD
WrjoO5OAb3zCNmXxmrPx4NGMps+43L8Dfj2brbadN7p72AjbL8v1RzKam5scWmZiNeOScfKDm9xLzOM+

WafAQRito5l1D6P7cgRsdZqqfFcD1stHebKUwB7Zo8B7X3sjCuKg1AbUbkCdv9yLmlYWKr0BkmYhhXu1

7ISknl3eqnIBkSUOaskJHkLIqYi5ArOezr6MLx4EWc
J5QqSbB9xx8Qc5k4TQXNxETx2qcC9WW2vVC3agXe6p+Kx2U/44WC/bBwTF8eGy7cijm1leWeZAwgnWtP

O5+hl5spOQvc/gmZY0FPzFRc2tuKZe6lCTFssCRK77z5BN4TBllhkJNZN6hxbehGRi0CputHo/zWNXOi

2aLKWJ75aRWdSa4ne7hTHapErVZZGBdSE5FvVphyAJ
G2Uvw6C9X7cIHdHwHQzWb92h8dQyhauxZK0A+BYWtsPjkpEBPDdW3k0HXvLNh8AxX77ux6jSBg98LpGh

56hgMC2R2AtJB2OlxSOR+JtXmMxsYaBK9NorgJIsSwPFAVGpGqH3PK7VmIkdzqyiajXNUONaFUTO5yxZ

ciiRxCojQ7pqM/YEHRdIz00gYUc0AuBNy5g3ho9Ov4
anSYHvdaz/VO3ALnZI/wUpboqXBkh8tkVSQ/LstNzxToP/y2hFCDvZkYWoW04a+trIYtMHMlqnCyCrWp

U/07zvPLMfSsiR36QWKm7jBXAexlBglY8snFv5vvY6mmRi1hGownIc8DpZXZ8oYRu/RZPuZjaY7WqqWW

NUwRrdG/sfFKKY0oaQgXMJQlKLsVsMiW5jLH7ZKyIR
OVgdNx9z4/cnucktSnUEaR6ZqXKkpfs9Nd0Mhfgr/NdPJCLLs0m12caxxnZXjftrbcb7j0MjZvFq6ERX

r1YoXRGWCdiQS8IcstmdgIvwgHfXK+2hCOs4zDZPpVZtO9+7zlY0d9dYDqyzHPFxbCSCvcqZt3styinU

ASL4nqAV2PTt6A+4QRFgZMoWlyVjO6rFY+/j4NSb1c
YK+ZE+Q72L1XVg2qWXiFjYFHip3hcgjTXCsxPoee0fJpW4zEzpXBYWW7j4N+tfQeKdz3nGptzOjq+x9o

3TUzL93j1acODlw8DNMUYuY6bucVzaZqw/79dIFeuXJspoYLJCF3B/FB5PFVL3WZHgJZ1UH5lUN0ZYde

WWqqbcgs9UJFfM4jGYa9fupfl6iez9k7rI0yygaEnr
sSSGp0gZZl0m4ec7tvGiymBz1zjX/230mk1LO+ZzQ/2WeFfQyAbvhW6/kMYcOVtUGsLUKv97OZPDrcX8

0n8qikk/nwWrHRBW2RM19cr5ib/8m5ELbo0B6afJ2WIDQjozHVyGCPJuK9DokRyo/1t53oafZvsHpme0

4SUtbSRCRrvjnV/pXpmpS32b3E8IIBLwNWitIK6bt+
Rk4GsJKZ80FdL9bA11pn854jtvO4YxyWOyLGwhN9ilziNhiLWPxwPj4yTzbwHOPIBLE63nvAJCqx0OqJ

cDj7jjKpS0W8Sl1dumLDAE5kNDEBI4luJefN9oPmYMcwjjcCngvyTCS5zF4820YZxKhxy8UOCjCjZiB2

klaJ1ErWcTTlo2Q+wZ6GpxS/aq5LILNVKnLYx3xDDu
LunemT+ULp4eTeZ7ln89rqnWd1BFMs5PdX+SQwZO7GS78P2daymRG4S1/CZ1I0nokcHdg3THYaZM6Ify

cch/IgmU/xm0R2qGsVvjQVTVxu1bbV7j9nE7G/85Ul0AdYSb6FvnVmLg1VWeJ+t0zXTb2AmAA1xPiT9G

kxGZKyDt577hRg5qH+h6B4klOiUxuHnuMsmdp4uP/u
ab9A9vzO7hljPoU8/VoEQ4u+gP8EUo75STLwcwts7rRW9e/KsvHrDUlsYgsyy/AGiQ+HUG5yGtLF33kl

UM/72zSo90UVFMiCku96OyYvPyrAP9tNp67ATSDbdlu43ylsfNHdN7ge6lM8efhLiSXZv25BWPMHLd0i

mPi7huEXbFFjY7o7e6wqXYawBJP7/Jg2sE0HTldRO1
8T2/ZpGc2hRapuGmOgT8YsadpSxkj3VJmmvBQumgWKQWndnfkMIXgxadAP7qUCgH1goC7UMMi3v7nxJs

Upuk6YbI/eiamuNO/GhzOiQ4RqppzoTDCvO6KmM6m90xeutEritEIqxamYz7Xi/UW6U8P+kT7TXQHsW1

CtV4ck6KKljfByiOySuwBmUsMORsg01S4X+UqIB2T3
HVNHZXUOkUgvVxBZjNv4BYb5sJFdgei4SARAUCPbjEkUHUS4oJWDeLCAg4LfzsaGLkkMau0Khbn0wF+s

uj0EZOkcPngF5VwbdhPUrQYbL6Eovn1fJdsVLAoQ3w9xqmHONECFfhdJAZMbVfluR4Nx0gCRl6aeke7Q

4oW3aOawQWoHtAzw9yf9U+IEMv5KI6GJgMh1ITSLP2
dBgco/1G07ogYyKTBj7Ey7O8Ds3+1Yd3FF6zEjNoBxE9fgE/EfBumZAq91ZA6YLX/D6SvBh4jeM5OP9o

kmwaC4M1IYpXbNatWfeHdpkPMrjrkjCirdv2bESJJ21GkRUFhT12tLocEi1Nqg8LBQBX3U7gZFGHKGH6

rX7fcZQN64Huao8GdQmkfaIat14Zmuerr1vPDWDJpV
4WkSKj8DD7FI+pPaZsu6sjX7yR2lAfgBPIR54B33Sf6yPlXJe3ZTyNv6IIPEqs2/jpAna7ri1nSLMHPj

sVOYPf1bGUH4RpxaluIGGuRb2EW6t2rj3RByin5MHeDR6AGEBtbqy+2ZfpZ3D1BwcCFZyHzeqzd++4HO

BJboh+KjvLMh0thQAjpEt38bxHSAKbXfxl6MpTTetq
FrnK3rAybCUPb6uBpUeSznwnVLSWzCzdpONq536T1D9TYqeyw77SKPniDyGUX6i6yQV1aVHwUIqIi55N

4RWYskQMU8Fj6jdlyHn0jN7KIONUU7gMYruvIODQtbfIVxwHf0zv7daSV/yYviyoqqpb0mwcvdzkfGf2

4FOiLFBxsqzFca/RW7zFR+VMaNaeBaAsz/ofDmielb
WYt9TJoT2fm+3SlGUYCFSfag4K9p4ljUQljbie6x2K332idg6qYlVAVnrD1t1tN7tHV4v7h1kaNQoFyH

lFIx+RehpB9Ud1aGmUxQivvxhJPwB8X+9ZD7ST99t0YAQzfGMbYE5mlUasXa/9Y8kSZ4pBJljCCUPYuY

B65h2sH6Wj1Y2fmO1Txu6kvFldW0ryHeoMKHkACYNv
sQRXOQGyzVjhEShAsUf9OAvoS8ooZUgzWaF6/GZ/l88CQTcelCCv6ApcIw43/RM6h1QBqSvk8UvO6izs

KRTQJjrw3siSQFBzX5JL+THwBYuwEsf/tr72JyXOzk5gNPKMkvjEALKEfIqtJEGIWs46TXUxgLdsZDzP

9nDI02J41e3DLC2W4hE6Swflq8/y0bv27e1zVt0y6E
tmn2/O/cNUdZrf2G+z7vu6r+k30amxnCPQMbz5HoZuDBv2hl3fxnCECW47RVyUYNcvTakDM1Bn4kSazR

H6Pvgjy1iS1csJ27YHEPVLz0SARu2uTHzN+EVJtToJ9mM9Xt3fEHklNytm04Y8H6ua/SHVuLat9pl/G7

bHV1k0z7jaR+0ufdjHjjdVxslPSmrVNO+yFmZepxK/
8/J+k3Xgz7fvSaIgfN5MH34l3VcaKcA8tCPsY5N4d89hX30WszIdVppPilIUrkSs5bfxdArrSTcXbI8I

YP44lMNq9CBeLqie9p2uGhoKoE4pfM1yFQcut/rEf9v99CNFgOpX6YfekRTcnVNGaOppF6Ing5ywNfTU

kBYFugEzN5nYv7Beb8xj9j1B4NmFLtI88ADsPogGXj
Lodv9rS8gcq7jrSsv2xAze76YT4HHRy9Oyn5Z3rJUUyULi38GdZQrMtbNvwneJFiqqGkXCr3rMH42OS3

9SDwRwZSgtfW9ZvurPwtG/PhVRVZLkZ4uGSWBXnuPwNV97IicqDq1zaMiBoqlq1dba74gcf0mchRgjqG

fh8bfxTkI3XJ9oPappSDXV9te/k6+QHpp+RvecMPkO
1wrpJIiKZWG0+rC7/UXf9LYsWn1IJTQTtjX2WBeXJk9sq5gBv3Wf2unr5ngK2DbwWrNXiiIxYJOoXxit

xuX7ohml7OA3mXLMwK+NpsHKUU0HmBM6qFvoeCtIdFolcXssCTOpycG1kaReT4cIjmujftT76v9h+j5Q

4Zu34XyoGDD409l4F14pXwONNjCJ5yRbK/5bXfxroi
2sv3D1X+caFhnUfnyDTXv9EjMWTsIOIvNl6fh5R4Ix21MX9/wm5XFlh++GqPRW8C8gxBWg3uPCq3Mp34

dZFd7bxLwZJ39vSH8YrRRmnbdlD1uJfIuZR1anSdIyc7+sQa5PgmVQ7SSDBH1FvJT9a2ZeGQ4+w3V38g

GSX4giyp+BcH1cfyF26EczwHjBoriV78wq7XHYR9CJ
x1W2Fx2f4xlAPNUXnR571RmA1IeGdtAUk0F965sB3XVRnrg5Q4R3UbDODdVUoF3MKDE8/sOzAhtrriSI

pRjZSMOSfCnE41KjEp04ySx+csNhKqt+ISL/yE1Ik/jrm4hEvr6tjyjs8kZqd5CZJIZrHfapngLQdDzN

BYacAI7fzm4Kn+nGiuSACmlh7y/d5klESR5pHF8j4k
M1lOyBtqeegkQ8rg0mfvO3fRZcwaV1VPT+xFhOYOoTYtwNK96XSWCnC9vc1izc6qn52chwOxBDsfcWpD

sA+o3cv+fJSBlDkjoQMnKfr9t6fhXzr5TwuHfTbwvs9tfBM7TJ5GRIeNFmy/84PcR9IrP7WP/rg7BLc9

muxckNUAe+6RBBbDoJn0BU7wB/cpYfKfyT2TSWxPMv
4jD2UVv7XivRPucBUNcp/zIPt/8h2wa4P+l5JL3a1KN3SC4YC/L0a4GkZfHvZ9thBDldRyHZo5oFoKWc

IgPAyLQFAdlLX2nAKhp/gnrFmH8nHaNJjpo+/A8XppwPKLa3TAg36/WmRz1wSuVyr25PVRQgnbVpj5q/

4UvQwKeAK4RK0OxO7hOWBAJbLZmp4H1x1mkmOel0ZK
xI5kkxkqasnrtfwIqm/ZZsYSHorOP5cw+IuMiIEoAoK/y4VuIuKl6I9mtjqQ/K7UX4wND1qdJcndy5B+

64UPvmnqI4/NYAJdqL3qYroCv65MfcEilOSiMnZwgRIXHAjZtJP98GFl8S4JuBLohP+VtuTOmoSW89Xn

3DNGkKdH9tPUJIv6oMIWrj14qnRCwbboU0qVhKFl94
j3BSYgtSZh+koGRBTiX1TimtXrLYsxrRxHpPDzuYSgLfYgaAynMxTMK2qR4y7yvtTbqAWMRwOvaUnYmC

ctE8pTQJ+0gUr70GckjvjXinvs2cy6G8d5csoO4/vTi07KBh4aspf/DOGHtRYyRI6aF9LfFd3+XWBJ1N

QpsTYWf//xL3wi4jgLU5oZJd3q4HiLctJnESi0ftXn
PKWO/b32fx9UUzudMwF1/5SaJZMlfHfn7xaGHotq3QIJlz7rwFmD60XFU9ut20vttDi5RDaCuBmT0CI6

de7Pnc4hljtnDSLLlP1pXGBjUO06Oyku50cl31kHY6bZee11AOhT2b4dI1X2Dn++m4DjV07HcaxL7lHu

BM7oNFNznP68x+4C8+7r3MCT3PV4wmxyUzpMeIEPQp
Y05cUYnXn9xEMIrWU62Uxast+rmvrzLtjW4Da4j7wZ5eFeGNH2op/KZW7ofBAT8Qjg8e+jRTNGsmOZbb

T4mZEArnUFFF5sVZVcDaSmJ3VUbDl21x8bDYfHlAZ6OlHwxqfzsMEyvvNWX16oc7/uNK6XD4qFq07d6y

jZZx/DC7huYUNh/kdK45PKfGR7v39aGjUsE8X6zehx
DdzLvLzp9O81Tz6PEwVnqPSloTrK19MftQ8Zy6jU9ZMMbNTqPhfTbUobRU20JR1TdZPlu6b+ccAca5Rx

xeKRHdNQ9ayUD9cVmhvdeuQFUdpL8jIB1+0SOXz95q7msZW3WhuPtRhIXbvgUAlX4/xz50QcrNQjXZVl

pZ57tlpm+rR9e0cgXsE1doA432/MXr+CYFFBDf2huk
1bYeOuDXWUFoO9vo0LvN26KQtBL4g5rwrw6kag1rq3N5hG89mjNEroNHu8eMd43vYeNtbHBxWX+YuwS7

HDwPtkjt5Vd1p35e7PJEDiqTbD1D7r4iw1yo7YW9yCDApsGe9ogqZmYG1nqQIqA4Npf95T8UT3QiQXsI

Lat1ZxBo5VxYTixOQjU5v3oF8SR3362S7r1H+8LcJO
NU+ewF5KtLNktwXQ33h92onWypHanJPf649EFIDjqLl0NWyi8Gjn4INBgpa28Fqb+0RqtgPZevuTAHez

NIlp1RizFQqzhG0NokAo/PFuasfqXBJGQnmxx3FO8XkKid25sOS9/kYwMbSEgZNDyWhmgSwSEoFotbwJ

2JQOG8y3HwSJVqxBltZfBkoEzPslOTU5tEtqTqzwyD
b3oLSOTnYRr2SD+kPvaGB+TdKzh4AxUQDZ4BUE29ZiH2DYtaGoMpU4fLcyJlONDGHuT2om2KLieRB2lU

abKETEW+Tcdf/40tcPZ5rSkCQYCohKnXLmXm6lBf+fH0N4XnvLujx6sqya52sIE5viOCXH3oLTmL9xd/

zxZIbRCgYpxVhCLMb5ajPdSaOxlZqaBj+FgDeiPINb
i1j9MEm6wqJdk+jw9U3rl1iUVd2cYW90fkFqc7D6RiMRcwb8KqUA1u+3ARPVcXIYP3HUdMBxSer7lOvZ

KFbqzkmybytNydtVyUdahAJR3fuVz50H7s5E5lQcsccKM28EUbQNP9o3QaawV8JDyO94nIKTBhVzH5fH

Ta2sMWXF2BUUMzp5Fc5wzKYTJpGGU8Oe8OCKOD9p75
bHdm/DLl/k1YDP5BytgdIXbsyoQl7wUDtQznsQRp/fIm15Te4JaPqNXoZCo0OFzrt7qBdpIMj/VXU6TZ

GyPwiw/qrS/1HmVrYO5p2nekmkTrApDu/os4BgOdyZ6RFJB3IIiEdfElEaB/24ftOuOYWHLJuaMVThht

wxTcmnwo5IPcYaOxkfg5nPL0yzAOTDeLW60mPWRPsS
gOrXz0K4i+C8ZzrkiZ5vbxAwihQGx9QpP35ikE0TrVU+yspdjAp4dXaw96Tyu5uivetWjntoxNSGuEoI

fjeKa4k1Ai0aTs8MQWYflXtXTdwJL1aWmIzulsBo7dhp8NdI81pBw2oE5eU/xQpzQSTqBmXYvFmjvorf

Bmok6dPvWe+FoVTKrxCS4YWhRqtIHcB2SoXRg9MF57
WQ3i2WUgZt1k9Odazgnrj0VCRk/ym7NB44dFXBWHpnw+D72el+1jgY7EEV2jonnBduIb6FocoHOTKNGn

N+ll/tupE+ti32+wX05/j9ZElgTSRbQKEKh7l2/2NV4Bq8QBfu85qqu6O1+2O4Tj2WcFcut9RL9sEfwq

a1ZC0HxFvpBbnXp+bOlcLyhPEVg9LK9pqF5LfK88FI
qOHenEgP5KRfFw+TClxTqcr7vOx0pyai/O6NepG/qEm3m1TsQ2yF+Q2cCfqns+1i2Iemd7ke/790PtCK

6EOH51yHt6AM2X65M1m8NA4+G8MQbGUatddl/Azt5S2OA/U2mcTJsktqgHoOjUjGgVwPSxWZF3y2wTJp

2ZTr0X43VNGlXdsgP4w1hf/s3K3t+ktinaN6cokqJ/
z6i8xuon1k/NKHN/WOnXsCiddduVfWbuQULt9ehuXoNKC15Fan+NMRYkeoHH+csIdleW8QTWmRmC02qB

7C0EvouNqrIB5YeoheDgX8GbsIO87bR6WBedX4PCTj4WReMts7nmsAIN2V1jKSSJITMatW+CfFvQ3xzF

k8PQr/q9plPsOoYMC09npfyRbwQZEjOcIbJ4Jud7IQ
iQ/VbFKRFOK538yYiS/+qt0edoXMdRzW/x0c/T4Euw/OjoH7QMnf4kNLAMvHX9A9ec43kA9qfhlpeuqW

7ShuT2NBIEsd+IYu++kFO4YH48NEO/ntDsgogDuKBtwIlDSUKBjrOc8nbfxlUYc4O4tN9VmDRoupsxLE

bd1FfWru513J4T/P/0AFvmnf/KRsvWa2Yz2FZKM2Im
FZS2eP7pbZ0GO1jzml1ZQ2mF/lwau1cl471P9MciaQsYwmQ/2mw9Zmm+GTcdznan/V974//6Z+37/5cY

1Lzd2WIN+yVt7ozh11chEPAXAJxWuKQpyCLLnJCeyEMBq4ZXOK/1S//X4V23+B6kgaShlP9LM06HzZtL

RUCAqe7j0G9rsaBlo7hhEVhDScMfnmWQauaoHDXJOb
HwQi7YehYhScCi93nEH3S7UKhDorqWBh6w2Eo/Vv7WxUNcr5lu8zn3czmZvQ4vkqgzLHnqxuZP0KPwO+

01BbjDjWkmne/jCV1skQ+j5JgySkgjb2CW/ZTBExnhm/RIur338qS/xeqFjYLvr+g+jWiSDvGL3nK1Bt

phNTBPl/vSI3QfYCiTR50TCF/eJxUqDDntjNM7BSbc
9tdoeq9yljzQmd+FrqMESPY/b7FVF0AurvprwmDaoAq0o0Y4POFlO5225VoTfw2HNdRPl44OzyOO+j5z

dhrgmt9UwMOM438RexHXNhpGUA15pp0m7eBlvnAi9DDX4eJ/yjGUOaC62AHjC28rVeDJrIYQJnyTQ2pc

OM3HFl54lzcoZHS0ivx93B35ybv7U1IKqwL/9eSiQu
1GSnROO6c8h5pmovqWWhW7vt3uBAxbIPfb6YGuD75XjGCp8XfevazRKKSneMe9OWgq61o0+DrzKh7Bcf

NwLE4bF7DxVUzY6RV+X8ZjqkjuoDSTEE3h7gOHUXDvQLrO5st67C48PKTP0bTH18gQQQ1A9M/65yhC3d

1i4G9CePrRZVh26Hiq8vdAk9dgg8aB+2wES9D1G82p
s9RmBhckeGJYNdC5SC//OMVBztg3VeLzwcvZXNUgnTbkmq+iDDQtwNzipsAilPPhkvpYK1vFftfjGYqZ

nxCn0jbnUE/xkA3Wgu/P1N3QQH5B1/AJzBn+owEwL7xfzi2s/uZvd8Gi/Ot4WS9PG+P3i2iFiiOrUuJd

kFqRXFp7q29dycwS8HJbvvp0g8Yc+qpw3Moprl9OGh
J7EAy9IfimHflMDp3A/jwtcUQ6k1ZtFg8gwgyn2bkSGY11wXSpxwmbgOy+reHBM1zWpCk/HQL67+JWCt

edqRJLN7dXYLy+aS5N5kQCSzkIF/ly4fPDYg+T3A5m7C+HnBtIfJc3ze+/JGuqrca62w58qf6MP+rPhN

p7LCwM/bUX0HYLuTqbow28HN/OxVXt9oBJvO/+o5lX
sjayWpWoQgb1BngSs52V6mQENI0OQUD21rxs/3GxBvayW/swGAAMSyAHtJ1aYTW/2Xw8YfXena21QEPB

Fe/uv2gXtBBOUpuacshf87RNtGZzBIPD7TY/raJBbrN9Y0BGQ0B9VP1Ov9EiQzi5n+eTiYxUKBReBaxt

Zu54s+Ga/InnYOM62EfV/HeDQ8VqJOw9NMjFfyYjMR
n9lX9HOKWGs29Ep3i38voH9gNe3darOcXWRQo9yyfxtNmIUC/hN4u8lh92DHB+eAFyzrAXoB2KCDEQ+q

czraZlKtYuMfhVSpY7tyEsHUmnJFt7glA1m5Dz52uBwD+mP1o8wk6g2GA5UN41gOQh0213Ucb432QId1

bGovZddSLk/Q85y/PDc0nkPXN7IujPwnAOXJNmW7KO
hyteKeHJwsUxHi8H2hPaaGFbQCZEXlpNoO7tNgo2a3P4M5ruljHm7w36LlPyF7NDawksKdPADOge8ozt

TO1ZJ+m+4sR6gWQ67ZNEyAxjBe39hfQl58bp/SSd3ac1cq8toslBE/lzhMAaP0XtBunHqKgQDJilPyZe

dIrB3s/UixuTEtCHKtJ0GYTmU+9C6K22+oMRdU3+WT
EFZ37v+tdU8c7+oAYHvkwTuex2otVGQFfEotQdErphaC9netBKt5malTniEKcxc6NJDkVqVMP/uA1pCR

8pCq7cPdr5DWvWaddqCC9RGfpQ/2WZdFBVB0C0dLXJ5bbECS+6KHR5PAm2AHSHZS3l5A9zZ1/QT8TL+9

L37wez/zcyRwXYDSt4H47dS1wyMTlwOyJB7o7epd+y
cWlKj6ihUHkSM+rly3mCJLSx+5Ai6lzIcfDTJ8+eqtwd/3GViv5a54RObgaSwCI5w+7e4Tvgsr7KmPwZ

T9tAjgPYa5xhkXvg97blrj//Ix8wkZwmhpFEBM04OG64FpObIOS9ueqz/4g22EwzOBJFW+1Lrz8tX0LS

riKfBR7TamCiL3N/7j+cf7EDLeZ9uQzHaJRfPloVUY
CnEQk1AJb8r989bKdbJPtO9ss2PUZlaUvar5dXvkoVj28ZsKMGrxC2SSlwS8curuM0qY6ybGONvPtwps

KYdIGA4dCPpmTVKmIkt9q8zONvxUOtlmoRS8hNTVOiubavSrvJetd9V+J9opCBkIuz6mS7Sj2WUe8vci

MO36a8i7/18+MduDjN+woc8k65Duh3rTNYrBGWycFs
+w+4FxeIRGl4d6MZYeLf07vUzAZLI9v/5qb/acsHrVZFFnijKsgbej48uon5FT9exz6Y8P7WaF+LzLGN

gPvxgiN8trY4rQS2U+qSaNvSVMdHD4/P5SIK6B5syDLeyu+U0CX0KtxQ8pAImNduAAbv3fbpVgPPwSVI

5penvDHz0bSl0484aAjk5q3IuQZ5tnzHCTjWTub/Tv
OfssM+1leNnUUO6ZU1eTcz3uPSIZx+6hHkv878IWASanvav5nZDMRPKeXpWgEepDMrj4/hNAgt1cc3l8

EFVSLayOBrBXNc+j8//Jo95/po7VqzjvYkt3kyldjAPj/BzpQVsiqHDjIsRav7WqpmUyEYNzjhfIYl/X

+reiXfDqbtINwSMl5iLjtuzht+nYJjtUyVR3j1p+lR
VGN7lc55ygQWokLY4BkMDRggZ6lJIlvdRii9ShTF/bVOiDFEeRE4ssVwUGlTsSFYB1JTQhtniikb5B5H

ejj0tL0jj6wnfteAMhOx8jm4idOG4hdpk0Y38t/9wjs7AkDw4KBxOClQCQRd28loF+MqR5Djg28YtQhB

YmS/bSL2QbVTEojTec/eDbzWcbm2fNHiJOAN9MoFjA
Gub72+1dJe8Ykn/4ngUesGJYnm4DsFibJmXUDiHTCrRUTcq9DaP1/4uA9fXYK9U3AZjzFPb76mfTJjhH

scqGKtH1h2ExJm6Ph+6y8XgXy5EghYtuW+UjrdvzD8/EKewXpIN/XeLJ5j+gakjqZtl4aGtkfwpCmFOk

Wfnb/pa9y3cx08V0X4p4AV9A69cinuXejBb3GVH63h
YhbkG4kwcvcn52MKsItGZ/6mPh+0phRkrGLpTZs0yZzm0USd8eIkh6h0C0U7cD4wkv17Nm2qGMTqshDN

MPrISo05OZoHLbR98VVvbir0Mr2Kzni1M0T3xeZscw4+srD6h64DWRULnPTuD/fK/7mDHYnG2Cxkzhpt

p0d3cwP9TdQ46mxd7vkLSRHFk4MVivkzSUm2uD54ie
XmpTvTftM54ORH5tLV7eSZG1E/togd0uiBPmO4rEvzvqCbj09jTqugTmrrW94IAEHH0lEMlU5WKcqim7

EDBCToh2CCiVQ0TWQVnXfVq/olepEt5eLv303cChUX2A9lEiF9xUy1chzXGfpS/gsGwZEVK6PeEZb8x0

FeDY3w8LTRS7WQY7f67u2iz3lQkDrf7ao02bszEKIT
XHNzd42mmsXONvAOF2ZpdP2CatWfKnOJQoKBeH9AGQPkgY3KhYnwwwJboT6NVemVMpozTzhpZbSKswsB

Ya4tb0Xn818IZlcwzD3CYI0fDCEdjM6ZOq6v8sRja5ZnlzRi/4hCjCQNo/RTG8y41oMN8pl2HGLdWi3b

jcEwaYdKOqpvBw+avLxedu6c6Mbv3PG+Oh7VIPPLp+
A8zUun8DjVmI2RCStoEEzdZeXyE1GU56Qa++e3k7OjqVgv/MiYX3ZIJVB/yQAD0P1iPoF6gHNLQj4idd

drelRcw0rj7iagKu0Fa2SDxRgIZAd4ZzK4KEiG6e56348GKLazGHlat7kPd3xp2f+tMuLfMaoTmRVcl1

7VMSFCxhbJolb2bpSuvMtzhAqpHUcFDYPy0yPX7PEv
UwHlDjbvbhNqQUS68NHe7Bwh9N4eSrGo4VgyDFs/hkLmp0XX35dh9IY2tqEY3aHwpFqJHVG6cT0AJhbx

J6PRv+XdkEks/uv0Cvn95MIoZ8w2t/f08Tqli/uyAB7QfNZ2vsrAzzwXrMOmewKMGFoidS8sKt/wF3ZA

M/Tsrrn/8CN+og0Rj3LvUn1SC+FhvY03aDl/scObHV
Ka5IscYfvV3zXm36Z6nOqpL+dpNdSqNeLJWcwuDZBbLNoOr84WWhebhMU4qGhDUms+1lhUdjr9tZ+5Y3

CcVDzCfMWNlufKEFh6M4UZ3r7anp88YmdpTKVLahH8xsddhDZdTrePI1ZbxgmXYDtmpOX6opiFhW2mqR

iZcTaDe3MH6nqLpst4d7qi1xlx2hXep21gqLA0OY9f
QjrpZtBMs7140v9k623OPgNnklGbwduUAiurpMiKOfoYp3wn28Wwxhp+ubj437/OCZ/3RwMlHQOg8LJ+

2ibS7M7F28jvqRTEBaLHD1Fxu/A5JoSgAXp2GilS2zN2EIX9JrtcKt0btmyJ/zj/j3/8CDOipck5vb+z

hbniJySv430QDvtk5PVAH7zGqXa/xbpomUl4hA5KpH
VTU0daBKhj3wM+4XP7oU+74AVTMF7fbGvoBlaiztD6sziv+dVVtSLN+uELUxdpvrSHbeUKhLtblMMjuN

zp1na88F6IQNHJM2CP585Iwe/x/8GVv6VDklD3QPdvJTT1OxX/d7LlgVSWBgSI077DZ/6fviLwv+Kqwn

/kOR1BSuS2G2PLnBvJI0NiMLjp11sqOlYZAVtZswqY
K9I4n92l/piXxeqUkseGfx/tGcaS0aCi+VwOrTh9IVYR7RtjsW3FUMaQLrWQ9QoQFTu13svC3ZZU5lyt

nCJhBSksWbnz4il+kQw+ox0wv4k4pLuKaA5jCGYgqnFcPLXVglTJsb8Us4Q+laOvMqbxXf9iFjBY/ggX

32zDpaitLnF3WNVtGd1HrrQyNmQoCtk26AxRpRd49d
P8Tnxeyz1XOjaKeb7zUBXJvCrtK88Mk6lmOYojLh/6tGZ6Tl9TFwwRdvx/hPSxU0fh94/diWWKG/NZNd

FTtytpz4ML+Tp5tLD3FPRDPhrGrChKEKObQf6SuvV7N8p9B/Ty8hGt5SlOLMx/zV3qd+hoFOey6fdFOe

m9Q3eOy3UJkzy0d43G80gbOGzVvLll3R43BGMX+Lkn
5Kf6TdSJAfWMGe8QfnUuJWooE1UV9ZONL7Xc+BPivsspCNpwWpbSk/1plAPLUao18Dw1UYM2rwD5ex9R

/zoRJErar+NxgdzL6Z5JvKMg/L5xzheE8+R4w/BnT3bikMbZZtMmODINU1clCqVQwV0IAu+twWzXjeg8

Oejea0Aduq4OI2EpMapTBqFxpGVnaxg4Acfo86CPRg
cOeq70vGA8Q9LarE9gfkPk17WOQzkqQQPtFBH5QlmRt4kdir7nRTFF5yqyX7pdgMNEzhSCAJZexmu/zF

+2aouKdpqzyqi05FLemJSfNNkeYO4Q3tULvDsEtddd9Gpc2QG2LSTYFVbuL42Q96Gqwca3OvUU23sgnv

laIra1wem/KcD7R3to9Cc5acr6LMqfPH5mb4pFbasZ
9H5W0TOJcmBrat1yAEPJ5NDwEjXVlm9vUswx0XMyV2QyebIhvVFttSzaI62F3L7FsP6AkHubchxec1ai

fUoZL23vwgYkbXJVwB0Kn0VesvXYzZOGqP/ej1rV5BIquyrFiWcL4QEJruOLBZrgoyhj6KNwjtRNJNqE

ULqxKxLxNtW01iGemJ//8C08WrZ4xCq6jPx0AaUxYc
iOkvT8HILa7pPwj+myIcS7lV6gydams97/KobR7dsVmCOv7zkrGU7bkXYOj23CgVSCSCYJKlYAnhy9sh

QtqaG2v7Lj9IDCBq4ReB31jTYdHiMgJKvLjwkndcV047lD3nqnyolQha7BL5ADXTbP3ixDTt7agpWIUr

oRW/SsTDfh0nLrp4U4aL6yE7rmorwSLV88EJ0NVX22
lVpUSTWsb0Z9gjt46H3nnD9hZVn6pCHNQLNaoPrGmtMOICgTD3U1uwTo/LVsM7e+Dyj0JpUL7aS12dJR

suC4rEDtbE4tj/46V6FjAXdLjL21C2563IUjC3sXTja73/QFDmyxnqY5hlkx5zVlNw4qWCAZFGH6nqts

o9jUumwrL3Q3yGbwHy9LtOQycFCQurupRiLnkt6Tdg
JWxku34aasowLTIukweA+rP9ESoYr/wWvZMtN4A2xygfs5B+YFd0UEcUK/fCu9oiuPSl32I4M8oOFUaP

Mv4fEjbto3b8ZDFy/Ve9kogYMe5jj0s0dcAoSuTUT0wWI2bumtooydcpp1Bxk2hfWKkPtXbv9zD8i+Pf

[id number]+mo5grbY4
hK8FeoH8mVV46782I3q0LsGBEl2T4/gh/EoeSj3KbHTUFI/423NmtcnplKBi7ySaQzDjYld6e4uaoO+O

Rn5Ax8enah41R9tvubPTDIFUGFQt0cJMKACR5Vg2LLF6x5z5vS8R3Kx61du9FZ1N5RQ9aV1owNDCg/M3

/QapZ5ZYxuh8LlcS7SIEVVycAECcoXCa7jMj/SvmW4
VuPdBHVvpgnSq8oxvUeIc0jXYAJB7xQyhKawXRyO1eOUFMSv3ecQRtFCwdMBaXHOq9oz3BpiTAi4ufV2

v7QOMZi1sE/gT3lAFfPOx6PHNtxeI9MmYeXxEXRcIUtbvvB9xSXEFGadAeFWqVH3fq7KKHNzpBAAYVvQ

alUsyaPLbGngZ45xf/2swrLlqrU+z9lMd5vlLpjEHz
vl3gSaUiwPQaNxdNYP0qiBPpniaAlK/iEkLudnMyyW0023mKoJ3bFcbMyK1ZGAEs6fbBMPnZm1urdFPb

h7kU8FidI2EyQGi7ttuqtn1Jg6CB9AuT3Wjy61qSJ7Ducwu6WV/7dt8UY/w0CJTABlnyWvPlwTHrcjeZ

CwUpgHsKeJUKUXMqvG9M4PcaH8KXRHTTQB6pvWvmP8
NKG7dlV9jF2z4TLJAuPzcUzUidup+OqddGJbJDtFFCt3Cu7Pz6JwVetr2DRDaU05eVQELfvWnQ7Iu/MB

EfegtLmH9BTKxAr83enro/fB/FWGoJ4oSCOjgfloCCQiDtEScPNJEPPMfhxkNL03fi0XA3+NDrEd2TAh

zcNYqUv1Hr/jVr+0XE3Q3xzrg8kp8e53+xA8eBOdwb
z5tNI8LnGwtnTrBCLRJ/RYirqms/Ga30Fgvf+LlAYKpzX8xAk81PoIN75fzw+FNGp2fikhE0csesdW1L

+/uF+/m1sBfmAm/5DM1aWjQ+L2aPHDFY7KeL3jX1gfnR+kq6hIgZkkZDb0ULXMNc6ujkMKs7Y593dGxL

ousBw4oovv1zdC7JJy8mWRI27j/2HfGcz9X38pi6MZ
U6Ap/JLPg2uqnWuvp/l7eJF1xx3hGyOrFSchlyvDqUBa5jL1ETmvyolW0bnYOfhll/LitWRSuHFbvZzk

dLaSphpq1HafXREeiZ72i98dQZ+xInzc566pSO4IU2gE7Lfq0207Rb7hJDtCKVuR3kA6Xc1FrQVaNL7j

rypX12x6AAyaLRAHvAEk7a9fUgHwW295r/bDqvIYb6
cb8UotK3icOfZkETJNwRNOZyGzXgJBaf93HzFQtd5rcjuq7yRyeRimXxUIFj7M6hGfO+4Ln8yWH4p6Fk

s1TlY42234fK8UqE49o2qe2lgb016bQZl+Cziw5cs/SJg0yoocwnk/gaDGCZIPsWSnZCJkefK2T+5AZd

hg3swt[id number]
xALuQXflkFH54ovDqbG8HAbT+3+YVAcBqnJ6zWXa8u5RPv0+d7CLHH/qpYy0s3rvs5+tqjeVnaNDalPY

0Wd0Bf8Slf7qYUsflsGquov5v88SfEu9FaWmTSTwSkv5DJRbzYM7X5tqA0WbnNAJxuhnL8P/cjX1E6j9

cvUP3CJDa0H8VjUKXq2FvJV/RyxHBEhhvxz70tkWVP
6fXl0QwaKoqR806dznrZi5Ip83waqQoVKs91MTEAM7pI+MkREKFo0k0h+9s6h10FzjFYmBXPSgvvQsKU

Gf/282j35w5m/UAg/sYVgYn2CeqAKotCiqfyGtZfcuT2v7U4ALsZDl1N0soEEkdMUrm/euYUluZ51fX2

t+BWxyTbFhMD0+ycZFDVNFYp2k2U3g8UIUrlqfcP4H
dno71nLnK09iRvB8I2hrEIQoSfbVYAOFjbIKN5aJzmXH1eGz0vtNec9remXHEot2Hy+XoRRglOeJ2m6T

3k86a/CAhZV7KCe+c15zRy5b7BnSWNBdj5eJAK+N433HB3pEO/Y9fDqg20fq+w9bcBLKjxFcu3PXXtvg

fgd+/T9tB/krM8uLggrAF/Bb48PRS+xf5cuxrByYMn
7nBKkZx3JopPL1aFWmowGPGcwQo8aAGyIqqUbg2+GUWsGhCV0TuSPioA+mzuLYNvLM3fsyLagwlGAUGY

2IRmGniduEetvmRsxMPAz2h9L6lT5dRkwlz+xPM5YuJ/XZmLtNOkctJcrNfUROGmPkbEEPG9p3oUupme

mY2SN9f1lmqr4/byhS+o6Z5KCMfjO2iTvzFlhLpWW2
eUSFkV9bLgBvupgkSELDYK2Mp+i8u0ETHZ54s7A32QyKHIyBT8XPB9iBnmq/MdDSt7KJE/belK8298Wi

WcFghK1LASWezvk2kLLoYMz0pg5qooquNQ+DJP9mtj3qeiswJWrscoqbiQFG4yWdZvhx0Sum/fxPadyh

IlYPKge9eFC9nIVOtILNyPUcvSveY53CmKgHBHpz5m
0GbmSwLsxet6pjw1518194lVlu4LRrl3RgnYp6H9W3G9oyN7tMyUs4AqYiWgHY9HuS3ThCljrynxzvXc

t76pZzvrLjUFvCEQR2msZ7kiBr7Wkeat0CZ/vZMhCjQ8DXWp2Yg2lpLCAngIVixlTIEOrVpOvgLkEJ7z

tI3q7eNH0sdXJOtGmuqztVS+WtUK8iuUaU/gjMxY+5
cKQ+6PKhy5lnos1sSBktcla4rPM48VOmArBXgXDtP4jjYfNmT+opwjYSjBa6BG/vZcYG39V1R8FLlPe7

VwAK7Tk00mATvfHW9pEFPbku2+mkIOtJcTfH81vwMOQUcV9ALBstbdr9d9LrRzQ+PbgBLI9ZBZtr0rLS

JaDGErbfBFL3A2YR7E2R2rKNTxyOWR6cENYBvUzRDo
FjZ2HhtxkFExC/joVQ4SFfoxDV85ZgKhTPQGwxEKrRcj0MhyDRQmePW0YWHSTXL+lgSNCkZrzvhFh426

8ybz4h812811qT9xJCZXa/h8yq06IVtr6wMb3/KOOQss2ZPGeGy8qShMewLBcrQ7WTinDfME25ZeaPBv

2snywFSiLTTBTexDSd8pB3e02d6R/Ypr3G9y59z90Z
H/XIRqLbYOez8BH6Xou9HKwgsUK8M1BC/D5A0NQtXzG3xI1IEMw6Y83sF3irOWwqCNtOHsNM0w2Y3Ffk

Oy/ZFaXqP2yg8Ct6MEVFICD8rsMgiljN++fIGdjunAjVajfHGmQSp+D8RIXKPvmoJYR8zFvT+mzUz92P

4t382cK9lQF4X6cttQYyKi38v71kTD++pcta9/n140
tyGSuiNgDDPD3ZhQJ45Ox7UbMdO0Z1U27zpYaapAMnyNALxqpaWw5aaqwhPgSTSBwrV6v5yMMe7YQ2+X

jYuWCBvB2IRaf6OCspSkY9DYNt1YGVdRHw+UDPXYo9iYT57R/xVFQkpAHkSVbvp3JRQS+RW35jSLvZPD

VQ+IK6idujJbPlb85AJ8YD0oFt6LvBWLxRvs+Ibz6C
EFA3XLfov7CcJk49gJKkQQSij7SekmiBTRw8uYlQ9Pe+viDSzHW+sLjJyWbHaKKVYPy1FZKexrFVhfYN

8DoN7twEQdw7YkcehdyguVCeEd0q6sMANhX4K5UAFeonPraNzHBkeCdDGiqAbVRV1vGP99Feieo4bEpC

x1pMxw2hF6DD10ktzDk9ik9l+al5tMpfom5zOQUodo
b9EjKCKedHBWvlzGHRKCxG5akMAgwXZcEx3NZtLiGKCnz+MDqEj20h5+mV8Gg79vuRenvt02BGRn88b8

5KvwM2A5YQ1NNOxavKYhZmCFPzo30gAKfCRGFuPJPoTgtiyp7uGypmkwjP/ZJshjddsA+hStnLTydb+s

LK+ploTZkVMEd7DB37oxpWrYJ5E5RkMqLm4NXkYXgZ
sosHj58zH3Hpae1sgIJhxhMXzmWn4UZChfivGvWDA5Y8AbJ+V0/Wb/pIUoiD1PN+EethVj4cOo+QzSS4

x8nFbN8dBA6L/RXU0ANkaR6cQn70KPaXJZ5v5gejawIyW7bGK0V2F0MIbsI9Pw3MC5dNO9Wk+678ZPgm

FVPwJatVTg16v5TEwYk/uBZjSPKVpIE3tYZWxJvEa2
dTau228x9VzcRVG3oH9uKoeLdUUOgb5xAhm4wbL3tDm48ngm3VlSI59HQVgHO7JfR6Pz3XRuEjDvAogb

xEIICrkShaPF4vRaRfVqTOA1awIyot9+i1eLwUN2GqLO20mNy8siWD33w0GmE+/4Yu9exDFs/PH34ZvK

FPLvTJTY+n2BhcbATUc2cQQ7J3fILkefuUarfeUE2x
VBOb0bkZWyN3XgYopJxS1qdABYox0zctU16ZRb6UfhPdeiCg93iGG99IOeno7Yuv+2U4wXA9IOfL0+gj

kcpDTsex6igRLI5zbAWqk+XtP4cMvITUl+/Vmtva/BHLJVm4L6cJW2+/rIJ0+vkUAcNg3HiyocLQnk/e

bHNGzdP7Y1abbyUmnYGPC/Vvf+Rh0z7buSpK7wbWfL
cNGEOfan9Yk6R0K8SNmw8O8O4xBz050XqmVOq/+X+SAmJ3yxCZ+WSdqcYkj6H7m1L4/4SGrvyI5bDn3Y

JB3PC9jQ5Gs1c55SJqzSkGf9hlS1MrtZSJ3V/5leflJVBsT0xibaxqC9815EsWpg0uIKItIFxdbbiAho

f0eMlcWOuS6Pa7LgmLy1uP+ZAbQQ+sw3t0NE0oh8dA
vOfF/xcfBtqbgZ0DGxhFjG4qcaCsnKgD6z9i29Ht/bgF/nOkhy49EQi/L085F8LdVljXBAmAIODGtfBR

NM3qRi/WyPQ+Yl3Og0afOZjE6RlHx2ZFkJq6KoIOgIqK02giMpBupP/RZp13JpYMRlgzqdYeOjb8cPt9

hCui+366ZLzfxQv6LaPJj0MvaCjuIhmfQkaYN4ZdCV
yj5wsbGimLElaaamJl2zqG7elmB/pjv+5vMsoi+6pvzRAPXaiLbs6vMRy/lSQO11RKf5Mz8PLDkRBCTB

yw6WZRhypoMvUcKaI8c+M6Y3j++cdtD1T3eahqALcSQn4cv1lbX9xlXOxBaI/eJ/2Ri0DrFnlYnOuyxB

ouSIx7ZKdjQOR5h10IoBjLjocdXlIHFb85tcTJ3PdP
1fIoAaR8mIy76oX23lVRxHinOMYWLEFcM8lT7qoPhFptvjdmaUskUsymNs0vdPF/1GN9i4RF3J14PNU9

RS+5N9jRyci6tKbZVfza8IpGqelwq1uWBkCc7pEoC+P9q/BcyU/QYj/niLhjvMvZBzYkGfmbMFOcNNKd

mK63uix5zoHCuykQDmDxaOdun5KNxZJ0nEAeVjd56q
sWbmNhF/PIO9lLhRXxImJ0Big7bpgDtY7Z+fXtoivLei3/jJ3XZ0FhoAQZyMjS5gN3lwYuE0DozPXAgd

MSZ2nEywAsRo1nrgq5ykfrGT6HHQoD2tBZIKbqPtUzLSrmjImgKlWfmkHM5rY4jBtcfS2rI+GIJSS87m

H3rRJEUcmuvGSnzirvz2XQghWWWhwdy8zMR1vhXCZ8
mbJE59nEZFJ1R5//muXEn3mrEqAwnn+fupK+5yx3aCd0HBl+4NMkA3KyZXL0x5bjFTh93a2HtAvkXnrZ

JJpWrtaU0BKbmdRLZmvEHX2VBxRGagF9MfhJsZqiKbsdzNv7EjTDBOo+y11ntaR+zeN7M5bP9pixHP17

cUaAwK1G/sZfOHmHLFFhbHAbUKBK2y2r7GPU1Ac/pz
OAp0UnI14RV2JJ1qID+XQqqoHr1xTHzAstp0md7ugM1Qw8bpwvhLaw/Z1XM5n5gD8xj8DBEFuEUqBOV3

aDPNPScMizj7Oz2JkXBidLjPWtcZurYkrXC5yLReHJfs/8CF9DJpYFR40PqsZ0W/zxaRMS855ReWAgYu

hAqVm/LhnqPTexJF9nGEBxF/XETr6HCl7+QVZX6StS
qsvSGV0cwoZqFD6dqQDy/n0ZjZFo+FHYp49GvHg1wUJY9nc4HxSwEPABDWg83AUKI/t3NHcShLnejUi+

Zh9Sd0+aqBhsWB32sMceHggZ/HNXTcianfNW68XLHmdMZKsuLwRf2hAyN2qgqWhVqNpHpIF9w7ztpsWy

eFHP4wF3AyRQwabFHy8oGC27Nh8Dgw9OxkcsWFlQcW
nYSscmcfnFReRgZs3v26TuBQlGUT9knjT/3Z5Vkpbh7TLLpDlM34VsTc63kWpChOuXcBULt48iV40dz0

+qWJgH+783JlvC3itMHQbme4Mzl+u2viYtDbCXmGT+Hjp4OFJDbpN/f86IQqn7zD16Bn4N1z/vmLuU3d

zTMnXEPz8bo/Cq15GMj+KreP8u4YqBB6fNLmXeVz3O
YSPU5SriI6ppwuMh/pLLkVT38/mIw7ju+CpCgrupgvjmUjMsVgtZJHlXBIn6sUMP68hp/WBX29nt+o2u

tkpRZnJCxVNaSXZu3AVFlAMpC1LcDObOiKEXY7Zp8SW8+2Llft5dhQmZH/vaNkbPk2lZj1P1Eqi0UxIK

l1ap9u7bSDkJAhJ2/pLVJk4GAt5UlPbQVF5nAmFeIf
m/G58ksBe+X3KONxamcBrYSzDC6+KOEHJi6a2fZai2+2OGn84PDEYS5/ZFye/BFuADSwPA+E55gsG+4l

ZbMKEutefDl+iXfpWz0C0DIVJ9BVpcWF4eifxRO2azh0jxq1swcg/cZiPB2BJ9bO0DRwMtUZ36GD5sk9

E3YINE6dnQfIqAwAx+0JMPE/LhsdU/qTNm9nFtO4LW
dM49T+bbJth3BgQCXoEuqB9C/kV5PG/f/EB+fYB4kJx0Ic1jMvwzU3SvybKidstcec54D+Ak5Htle6tG

Ax4lt0CSLNOM4TmcKJgkhpq0gcdxfkZZbkG+IEjho1pfCjuX7ZRvUSbEie7F5QzTjc9U43ZEJ1kqNM03

6BOJ8x7TSsuvKomGSQuio/WTDKrrrd1kiWEeL65LEm
JsDDdYtdTAOTn/OmcZFwl3Q7U8ByPypTNUr4Pu018umtsicmI3d3zvo9n6c9O8iy7wLfhnBa3Q/JtllZ

c7KYPiuy96BHuwCp+33fr739WlqDE2z7pwjTJ2hs7veJNXnzCgPBtx9QMTDsgznQ/rOjYMEKeUhPERVP

kXmczINv+Oryyw+sjOYGLOotwUrW/CtLTMQy5Sw4BR
el9GgU3Cf7WKF0aR4v+WE6Q8Sfwh1V5bUxZY5+kjs5670FYKO2lGnBGGvVhSd2Lzn0on+vvZxTlAOmhT

6iIAVQwknzHkECFn/F3sWZRPR/yJB8YEXvpEqAP6OicJHzlnPvyrnzW6fDuGeQof8RnA1dc+uY/oIJq6

isjzc2i+Rx0rFtSAGKQEV1gUxkIwABBsEipIAdaamQ
+JDaQgvGSpVdXp9fiu0v4R7WK2Px+8WhIcXxSGaW5ToU7BjsJvcZc0U0GXuXlQUVJIleytSPWF8ELUyR

daRakWJnEbVsjUXL3heNV5DlN/C371W8hG6khGRRpT44cFeyy1TulEc06Yrl3zteWzUJxTfHc4hS/xo3

Di7r8YEdjpG21jXc2aTZAqM1EbRPy0+WP86Aou3lxZ
2zm5oTTciggplXRT6q7qEQkZcOq/AoouAJa2hUAUw+VkvpvoKGSlQSv4b2FyAgGIHKa5+KTvsx6dOoes

ox98gpxrI0iYYkjKOz0XxElIhXf1vW9GORPBDPxV49JH7LPRb4H6ybOWSPNGoQy7ezizDPX0dOqgWHRL

s3EWo0BF7ZlJAU70SV1MIVY87n9QlSLgtSjVS5gzZk
hGY6juHbV67QRTguMJSbuZ9L9qHXTLkblNz202St96T9LRKoxea+tC+cgTVuGhvoO+EKe85q3w/F+pfs

6Acd+3Swx7enmXes+REaTdiZa5scM8G3syDd6tmuchVZvmfOyrpYWjNRvPVB9sNJKrNpOOIjmmEnFZDk

KzU/VkP8YoE2VyFI2trse59nZcU3h4T7FPebavB09g
Fv+FRMxuuorzCOO0vuQ5DM6iu4gHdooEPfrtUSlE3jqDYC4wwrDNcDZpvyB4HJZu6wafudSdOxi15ymb

7vgpV166Ie7vq1rVFTxJixU8rIRMCzPgIsR3+qacciKw0S6NYcm0V4Du8tm/QFmNgRcvRmUAMr5VDZar

f0k363YT0852Smxe/o1evwILl4nJwIuQGQQok26tOZ
pqKsDYxqUohTZmAi09Ij2QmH0RTJQtBYzLmD6SAoGD0vQ7RjHQqDAMqe5BLIKcyEq4qIZzcYYa9J41mE

O/klzG9aGFs8LtpX50a10LzSueoaZ3rMCaMPbknKdIRZEuBXxVsQsqx3nEwaw0yYSRSpWQiByf0ZsdXL

wp1rXbE4i3YAiFUhX7eo5UaS2QeAdRzOgEdV6lrKjy
FKN0BRqN3hT34nI2ce2OW1iG+EDuIOpFDM5rGYhOPYqDyyHGgWjZr7D0oEdNTrRbby5fkwH6mSXESiDv

IqCKAgRoiXYSF7LlWt6oTU0aWZ8gtbNYtGcgkcs2LdoCkJv9D3crz1b08g2af4Le7kE3UOBC9Hagh7ri

pAJ9Noi+woF9gIlwKNzWyRRpvxZ62s7gMlKcsPU0lh
X4sYeGbsDCDRWeEQEx4Iqm/KGgLr3GPx+z3SQWhkj3mgD5upKkYzNXRVWGxiaiUJJ87/DiD9AbyShL4y

iUxUsxn94rOEZDJp6edC4Csdpp61+iXcsHjUW9v2EwfMJWGNMucrmWHWdU9iS7Oa5J0AMHXFXtwbsG9P

sR7H3Ek31XacJt+ijIqmsT2GrN0gEbWoUQIu5+r8eZ
m3Zz5HrzXT1+fi5N2OGb0V2jhdJroQ3BdGiCNGefPgCEyzaax7WZPmtswbI22vWxVWfmRw8wRzurOxQZ

MI+UP1VPM7SLWoDbmbXHhCc8pPNS1FNeC4vR0lk6q7GJNwFskOhmrWkUX86uxpZ8nOFiNXsSROkp/gRA

RIh2GmieLGrCvUyMBvZG92Q4+NwyEA+vb0Pk5jyRDK
V5OgAfihRMSyGp/fc8teS1woA5AmGxtg0dwMe7VVbukxdW6La/u42RbV4imVyRJBnrdGeok5pZObedi6

OxmFW2X3I9a97nQwxoBx72Kjpo8dVMjgxMtxMuf8sRBjscLMeTzqZS8s04+etWnSYi2OjA1zs6FUvOrk

uIPA3oYLM1kHWe7HEj4/G6gvEvEVPFEAPy+8caXQtS
D61wBc9MA1QEkyR6p8Ca5fEOSQZGi1cdsLryqspHkKTpFGXWOlHG4kM7DoVbT712LlwpKFUoCjSCpYLP

5KU8wnQFQ1EB6WaxhybC39/gsIl0DRLs75rBW30vDLAXAgLxuVkGsE/Cu0S7gOpSdhG2bKsQ389Dx7aB

46Sa7NiPpv8MvSDZbBiUigibD0czL+b0qISxj//B7f
sFqAkjXAl/gGjz9k8aTLXe+aL3yMoxFC4c8JqTcgWtawYkcld/c4FO/xbcJncJQjfZXdmIGHbQVrWbpU

6v8E6ZsOpoSAmWmIWm40hfUYklBMqpr/rYqMLddjrM+tTVVCvXXiKanO8fJmV4Wl/9RghhiNhGk9D1rE

LtXqLs1shok+pQ6+eSPsUMV3T9TXbBGjvFB/qWFcgx
WjbhaFQDXk9dTqq0pOo7Q0ejgZJIYDAIVzZc6CEMHWzeB7bupaCwdz4R94Cxz9et277oTA0MezabzYY3

8stDQnagdxuV8BJv7nUdfL9Q9RHzmBvLpdW8P1WSG5lKi6WFk+SeMitM/rBs8uR9u/j677Yc7i2HgCD9

d33EWefvu7+MSTfUgi6TEkmPLdEbzY9yXUKhlRWqXw
lom+AAP5pXzm+/W4pDv843ycOuA7HYLqDbvrr+o+eBnAltJhwuA4f0f+IpBL9vP+zrNoIy+l+CgRCmTU

3vLbChWMxaqc+krBuYAeHqruk4WPXhBzlBtzeeKa3BJa+kLrvJd5XLlJiq1WBEBvQ3PjOIkAoopxMp9K

maKeoz6ZpuGDzfAnwwZxBTwM3SSpMDRNm73jrsoTd2
dg1FsC9KF1rqhuEeUirGXXjrnQYjSQTsfQupRQ7KNqx/HyODQv8hl+M7FzFRV7738kDsUQmlt/Mu83+p

/bkChg1WQbL59HxuR/4Jk3bcoSEDvq1bvGeNdAFSXr16xax6cruszwHj869JA7gDdudntPZ/E7XKMdb/

Fb30qJf2kQZV0YcHP0jp7E+o6Gp2SY3R/8WiTPQ8S2
WlfohPYI798bQS+0Jt/N3zY70pDypuepoTqJQubft7cy1YWaE9ew5HIDJ+yXbPbZXorKRXCQgbiNWvcv

wuewCQUcGd2edH3P686vfld7fs+fSnyX2+2WGXYrmULhL16jFH6ExxBF7l3JccOcI0/9Gau2sTJBnmOJ

UgajfJTTr57f5OppHuce65LdBO8rUxrz08oR1ome4L
dgvCcyI8ledJtv8mMiGCJg2BgE20EA29C1jzpacH0dmmN2ssey1JgkIR26tYbZffxgLi2nBVLtEu2Kiy

sPj3oKMatWzpxiWB/iLRh1ebOdZoe43sOgzzQtJBeDHwYhBF7xZDEVK4GDO8B3qm4jhvearflJj4xVPv

uvF7sZvq2bbIaypp02qlEEt1fB12IYSVFzBLWtbGO3
sDDUgupSiocYsG55GnmxCkE5qo+qwftsnAUBrHki7wGBJImybLNKxTyWAl1w57I7yKeS86/jwJdQAuhK

57vmJhBxzRS0vT9hqr2twHEcr7O+9UibapQCjRL9r6KKNrHmf5+j7IVs27ECXEzwFRm60/rh8GVJ4Ou5

j/IZ3IYwGkAcKreocwYeAXJbdd/MMGmCn3Ekl9l1dw
RcI6uiwFztJPDYC+VbE9OTSSW3rL1mUQWbb4nQagrxfTX3KDlQQSuP7lm2ZoWW++/lJtbmHsevG+I204

37ZnooVzwelmh3EntSv1/SyVbPsPmdwtZir94NAydvUMswsCcMGUOBqrY3Tob5QmvkHzMGND4kQewzQH

yzmYVjIFy6HaqP7v4Sn2tY0nFKu/2M4FEdVCokOo0j
OJvKtsK98OlWLG+K1R7BNJ9CJ4Ya597kgGyaQEkJQkg5lAAQfQPi8RU2i/rFnk88Z1gG/aK1mELKUco2

RlLANiN+LcwQg6RIjiGk/Wnbfu6RGvamYttP6Dx4k9bX95LLrWN+nxyEy2XQ3Ku+fQ8TlagRrARuTtpQ

ZQbWh0pgb4AExFwV+2/svWdQlO+zIDqIEiQjOQsoKE
czOgZDAJrOMeBNKMACmKRAHTRacDLFG11EDFqgDXYwHYTnbvsSPNq/2JMKyFI9L+lt0d795y8byzdEY7

iuqUzYn2cxtym1KbEu9DbZwgIvKN4GCYGJ277NTsTn6T8g8X1Zz+mJp7IJffT+yBorP3DN20JpnvA0Pm

sCZ/G3RYxCNsw/ip/vps6qql1c0rcJx6Bf4YVxtFye
qh2yja3ZuAuVZE8jto2aqdMdEz6ng22/0QYB6JhNBvRdIpbGG2yFwncK7HourQqIDOUmLk9MHlWgYkPZ

kjO7BtT0UtweYAsgqFZE9RMWjW4HHE0u0vLz+yo7Gevs8h+ABfkZ+Sx6E6Qqb+sNwA59tpbU7UCmn61l

HvZcmcQTFNZjIPot94psg+moyFsOTenQANzVMrVA90
qH+RdJtRfzqzjHNqQ1UIUtujpfiA1TSBilTGmEt4DCuUkxWvcaHQz772SRrZnttfzrmrNLL/xysP9KlB

McPAybcxAKmVoUwVh0sF4HeqXsnW6xsW8apORZtdfElVgKIc3tCk5LKLq9EdvlGcNmsfa5Jku/kDiKwj

hrSm3cLqY1THRLqgOYaA3iTKspB5LkpC21cmnsC6YO
swbxZcaP6Pb2o4A1HSX0SoUYnPcsO0tnx7Sn0y2nEMPtE5g8ej4pqtPDvbfV2njbNfEdpBgcFL/zoqKT

KHM8Uz/G9aw7boy2Pvk+8GkzObShx6vq8L8WDVzSVk8Upag3nqQDL+Gj7Q4Dj/Rb2uM/lwoR4fbIz4MY

ba788sTl9eylWpn+P4z4tF0zan/WIlqkcYQi349Yp3
4GFryZA30HsmZ8SNEIRgbttrK1nJFeqvUKvXJnPrDh/RwRy05iJw7QcKHBp+YyrUDqGtQJa5nwva4LVS

uUj6FQNgvj04tVA63sHBl4Y1G9kEOCdqWoppHNwEYkTcJV9hfSkkdrSHoqJS5BaVrtvo0ULYrDmqbXEq

+OBEJA0kamcCH+R5HekEPxJM1kVhUBpM5MaLiq1N9J
K24gid/jJTfHEuNRaVSkwgih/Jcqq+lAyTyHIXbHTOXEzF+CbOEr+rMkPksCljv0eH98exW1msafUvyU

Y8u/kdin86Vk2c9YunIuSYbHoHWjgPTfEVuhRR7fxna9ie54J+wlYNx3iRZHMNhbXb0LsOfyDE5cc6cE

vnajHK8KWTBwFxuE7A8yR0y/DMyk1FjgcW2XcoRYLa
RXrkz4V2ffYlP8lfgRc0Gxpn2O8jgfTiPcsrplbvE/lF5RbEQU54YBUiLiFbikWeAwRJ9R5nlsgcGGb9

+fBvCywPPw3fCqZAFB+vCU6Efk1Xg6dEUe+pIn8VMBHdbilK3hflg9g5d9r5Jt0Bjf+MENG/gg9Td185tS

IsMF5i7A0u5koTCNN1zk4lczR4YXDP1f7nIs3qGl3h
uTewwERi8lVcyoVdylDmeomOJeMqURQsjGdUA4i/zkK+Ng7vZVgzyZIMmWrMt60MFCy44Lq56ysS19wH

I5i3QRf770GKNvYwSp8e0XeDkcb+4QVEOrtFlwGvttvNHZyjYftOlXYSF5ALnM+9QiExqzzba8Vb+J/Y

zMjV741NKaL8SzlyvL5nW3S5uuUeVnfGw1pyLt7vb/
/lMR5smXkgqPqmakLVAsy2HwLevmiFECVW/ljtbxlFzsxD8KTk3km7xcalRcr37xrywegO0pSu2xyVq5

o244d/AEbrqDV4IhgYEcK5jKZcPpuFQCdcVLKHfpyub1+7dr2dGL8bf7SSebPC/kYECwyl4czp94FmLD

TkrxXppVxdjldg8F885UjVfTaGapWwfNHU1fVIqexB
/8EX0uVZKzj5IfR7IGWVaj9/a/w3MG/Kyf29CrfqgBZIId206/s5XbE6R3q42Juz+8EbtjQMsKYQCgNS

/ueM4YWN7UbetQfoHyGMBJb3C0eLNkwYsrh+6VErikGNvJfKyGQZ4urCHDaLhGUWL9GnB7T32xQ459Pu

1AmrDCV7M1YtzGCV/iAqfOfU7LTl8ZqKS2oBh9F5aZ
qXCXyRWl/1hG9HLEbY1v0rdAdj9zO1m7TBhjcJIh7JiBRw/+CosafWsTkez3KU7JewG37ssDhh8vxThQ

S6ZrPuJ3N1YQEmEroKNOfZJgOwIZ6O4TK34RFFQaKqpmtO74iT+cHWrC5TXeOAU9MJFwmiNTKnuIdiM/

0+TmxPOXYC8FYkOy8OP4st9l+RL7m2nBijjVhszQFy
dmWDPEYUder2iPryWJoAWfTzTyjmzA+G6s3+CxIDqmcWxmHcHjQaUwaKro2bprrWH7CkBTeueSw3WBuh

BomybWDc56JlKi8FiKvJ4yQyGCT8a+CERYrXlJq9mcmSxWlg/RGkP35dY4GNI/5jjGn80PmNurpAvYUU

9tRxWcTrKWZOamiO7aMnADdWJXeH5OcZ3OyTNFTTTs
ClI8ggCy7pPY9cfIeHr4UJDZCEoo/eNTr9Gzu6X2RENi2ZlAjlvz97zIdP7/cnag1nhbRTWK/la7GRvf

BYV2denLdTYYW4okUU5AnwFfUTSpk5/Tz8mzhzm6Ii1mKKJSOo/cO2NFObseM28YHjvQAmz+/heHzP/q

p8dt+lfsE35/MkMgBgF20xMt7eu+XhxRk898GqkRxN
0mwYMzwYU9o2ZR6yB7XmFeGtjIps9oc9ASVjMpGc+halNbNApMX6CFG/ResjCfB/RZzwo4115gT271FY

1u0sVGT6Tecv/HgWLRQBj33hzhKYVnNYfs52W4N0+Pf3+CVhRXe5sAL1rsQ3+7i5bNbYtQNcKZicSntR

DiZeKjinR9sqY5UBc7uPEpf2220R5lJiTJepME95Tg
a0h0kSB1X0Xtew/pKeb5LCHFHw12gb3klh1Ob6elWO8C6mRA6dKowU9TT/PiBgvtq48FapHms+JeF3PE

0bkiP7Uwa43cu8MPNLssZx4Pq0qZ7flU8ebcMkANtbMZbeMtJhesaSaDxunDzTd4XyUx3jgTgQ6lb+/V

5HIdnWgEoRhllRi0rjX0oDOMg4/vYcfn2IRu+JgJaE
5H2DiMNMrKT05sCZuJplwj/3Hssp7NY5sHTeLVvAJdzAYQ3Rahb98pmSGWDMvwfxmG+vRj1pVePRSPQV

l0LlLpjEe43052hoss1IDg8WO85Yx57TWGR0KD7DIgSJ9JPNKWuY/Y3b0GEQslcF9tJfcFOQZFK975qi

tLUxPdAxSw3+Kenny+lCZ3QZCZTBMt4wtBIsBXxKds0G
/MotC3P69XLlcYRFn1ROE5QNlatqUOE/bQKMAVq8Zkh5C/cE4lXz6OQpTVpgeu8TSUyxx8wReA5Tb9oT

DN3mhIES/jAVwtECB3DTEgHAXOnKLbh9toq2RI4NMoI4MUf3bZwy3vhM2gpsf5k69HXLMJYq0L3bpfXJ

D0OuzqbBzBvAOmHdHzqqE4yHMpzY2s7QNX2OggqxIP
GWISKC5gaGwcNRx4IVZV7+NOulDxbdJtgrLV67kR8/wKAcXmYOrBJIbgcQ1KWNMmideUvKV+k/JLa1Ye

ieVaYrB26j+Cy++W1T3mXMw4hwrwhfXAu/Re1uyD0ySx7ky712S/fGqqdwNWkUgg0rR5zJTnypPyd5jK

yGKtQcdVmyNpUhxXzie5ArVuUsEiV2GpZOrhRAag4S
IbtcAVH1tAOKFv5/qY9tRptbPLfgajsWWBOALkxnJJXR8Ro5UDEB/3IblRop46/Vwsk3UouAasrAt+NE

sIvd5eg3xC4SXzaPFzUmEZon+N5fx4b199T3F8eAeO20ag5xGkKuk+A6wPwA1RiZCEvVqVoMjz6z2Mtt

Kmya1q7maazY4NN39tCF+hXaRG3ZO7oDv/nfwbrD+9
U/dwTuoKUoMygkwD/isaOC041FQXXQ2iaDF2GUoDKacOfGXzktBuQpKXHmPUsGxfqUzukBDdDZ5iyN/5

yWgT8VAQeH5nKf5XKN5JxlX62X48QIu/FiQJOgWdfqqk5MK+n2oPfXr5gGmEFVGer3muFxbNzhofMtq7

pg34r1Es/BdxJO6PUDEcl1/A50GrRgoZeQWlWvUm7n
sRQtQiB4FT/fzSsRVo1oteNy8Af9CAcOA3fYFOaIGs5kPXvVd7sJ4xJGHHNpsFhUkeWB82IrrEWEQqM7

kZxrkiP/B4khKQJ1gaCn9q7+j2cOrVF3T+EW5cFLJWq4lNfQBiLw05xzRvLfi4c1HtmpzWknJQZD2oaR

vuLZhLjCFSQRIWL7AE5hAJIWyoQ4hofg6F6enTm8TP
t/Qy+K4kaLJm0jff7aPWSuGchX2n2qZZLMSy19o0LTuUqADd162KONBwXsrXjT72jVADATF1Alvn5Sys

8MoULj+mvelQbG7bt+zoIvnO8od9qINFsUJqqpeVm+BrNwcuz/42WuEI4h7PpaeCqrwRGuCE+VzfGhWm

mw11Zqtgg1u1Rc3Jy1MSVPlKDFgyZJX6ysCufWBEd9
7rkG+aPCSwwtKt8NExn/u8PFAgXci1Gk+p9lPS4lg3irYPDV/RBg84peZLn3KRglsJUgYSNZj1ORkWAI

dEUqMG7QrWeMn6gj1OIj/wSFTyNnOVecwFtg4C8lL+bL/NCRyd+IPIlsXfP+kMd+WS5x9bBwJ+oF3vrL

elQSNzbt6O5em9NwRB7aLyGqufmLW9u3k6MQknfwPr
bHuL7ep105vmt9TWGyWhf+MKOt6qybmsHqKWdtpHQKd8UYccZvuzYyQwxL2RhEp+bJwGFxXu+vNPZprU

15ucfrgZIPKEhU9mrwkubORGe93gz1nhhj3aEG+Ghy2lqGLj+6lZkmQbe5YyuLAMOo74GKXerUG+Ddxu

OBhJUV4uielcoFyEWDW2THD5pqFrtc3t/jU9jAIK2F
gkRl1n1ih/wcNWkM7aOgtFoLES0pn61j+2cv4ql2cVtjlfkE536FQV/OwLX63a8Z2Ll1CPKDXXPoWBVP

n4+xdgDO/YM+wXXovbfMlZkr9aCiSvW3UUylVJ1bhsbO+m0rJx+Cnsore747Zj41wudmJsTDWiuZDx1C

RwOasKixPnGkLzwMoFLP0NnqXch+ZbSVglVCJPrxTf
XOzRvzE5CItwvcJgr7M6DSOYvlTNOB+fyan8U1ifDeko4pRV7+FmDdH6AlxN5fDIvqnxRSfvCB7/nV7/

J1eQ9X61qtfTr+n3N9Mg6zqDywmsS2QmW5rj+c1sqK6m7BDIVgqIH6DdAsPUgApgfWQYKVGCb2HLazdh

KqIm3x38GG1pIgPes3uzM8QlB1BtDXW0qWOym8r5Pr
NrJhLjheiJFDazJyX75Uuq7P2irITt35iSF3Mmj0rdiB++ym/DYt19h9VXzDhU0ZZirpKnAA5YZuNYc+

G/iF62nnS47VQNKZFYy2xTcAQ4LbMsbp/+7qvx7gSNpnCbjBUX5ELxnugcbb9cXI67lWZVl5yI6yf0Hp

mme4D4lPyjBN0TaWPPTZtsUoIShBM7+OqIcDFc/T28
H9+Q+04GJF8kwIsk7Eg1Oiafj+lgqrw2wd81TH9SJxIvwYhM0QZgLEpo7KCYrvdYceRJ/oFQyHhFZyHJ

If/rtGhGBWaGoPlNDRlmZ8YjpC78Fa9aVpgYOgBMtRXDEWCBH9PNIh4cccv5SpaSmD2ZMOzc7B33t5ME

GdbTZvjH3jR3mW3cqePSgxZ52pwZ/+sLouKlEkP1jd
BAf1OaW7jqo2300054vuYV379TCRT+arKuqmPLjjVAozHPjzVUcfYpectW5ppkd/Jjg+Relansyrt5SF

Z/iK7wOkragdYpqWyhuHssPFbWlNpF4GDSu/hJgiLpaAtED5krsk71UnDXPwh9QKckfbLYDnf1qPqw3C

WENEBTlOhJAj6WuVtT/8jN7Mft45sMt6saUZvw9pWn
Trg4eFs1Q18MPjJSMwC61q4/IJFh1f9c3Iqps6pvamsfy9WtqbeWZcAgU3u+/wb6liOi+Q6HGhimGRuA

APMFBqPVQCHz8wwbcIluTahIzVpZEl0bAkXhBzYr/kc9QQcCB8zls1Mo/tEwJX/Oy5MTW0qLPZqyxhL5

LYvuf1/mA2KIqhLndZQ9Z4xgW2AqMVa+5U7erULcx0
LT7TPFSDMGIpBKqQrAPiUoQUdKqJHS6sK4BGgGjV7t+uA7b6MRty6L/Keegan/tOZM/Eef3k1x06PmS/fET

UU4E8pECekX/t45Bh8D/gxAE4Ev/wP7h/cP7h/cP/g/tfG/H26d4zc6Um//chi+jOpY3iGk3y7UQsAXTN

fvJRZism5YxNt3JHycA0axAHnWny+J6UNmfeLp/9Gw
2tIR40cuY0r/IN+9Vz5K+lJiKXckqiDyftqjBEsjw8tc9dUz4CCmoMn8NsyFiJA897wd+DJQJZMyp4h1

UpEu27JiTFq2Bxl1WidH+DkZQDCXhP6NJVlFdXrG7JXJxO+OqtqpVUveYsPc9QR5sySyPYJYfCLpM8YV

R2rWdKE1a/l0oLTfD2bYW36ZpyY6GnPo4dRgk6sYQe
tCgo0spMEUPGcp5uM5DbNRtYRF0//KeM73k3nDufA1AFxLf127wg8SAeJ4aV23t0i6CledvjOWTno6xi

rl69EQfjuFuZhyHLK/vl9CCZRGP31kZqgJqn1EfxlUVhWXv15GCrz8cfM3OeBzr1/AXBy30sEu9yKFuo

JYca/1QqEwhMtwptFaAJ98nA46MrEeB4ZCS+uzfXkG
moog/iG1EVKs4yrswmX8Wf0t52eRWLmiLIdR/lyiM5QA4/DeO0A+iWigIzKbfNVSPuJMmjGR/8g54BWB

LdeF6yzOrtEH1kfIEPkLNj/0dTotIzp4ohs4lnAhdsSSSOf9hQiboqDw7Iwzs9gJqLIzvcNzRxKFyAtJ

Tq/Jt8RpV/r+OKLUWxSQzkGp/vnE6FqVxYKVpkGl1D
2gq0i50XI/AXoOmXH+fJPm5U9jbvFNou9WbUTjvRHgd+IwtbyexuWJKxocDKShtyQwcx2WGv60gLRPDX

nwI7Jjc8EiWqR4YM2D0//XxyRzZOpbD5MHqbMUCTt8LikCfUy9Bc46oeVk7KetQckrCjkgefrj7UCtbf

2/amsNnr3gsNcblxYex/oTwuIlTvc+eVLtP7J2EBh5
gLdm14CaKdUIJuY8zGZductM8crisWXF9s18/egDOY84Ki1TK9bmKKB9HYWgEBHwZ9b17FRkgPIi6kJ+

OA8CFTa3bo3RsF/j6T2dd5IzlDIf2tThI0dPvzWlw7i3AkrZwgQoUjDHcfSQgOv1Y7PDRNjIjv9mBuwU

FQK9WgIIvwwq9KAjBwlsop21qLgOzYb5jHFEXCTbid
p49qBOVFUCmfxDIqZVf/Ka+QbbKQuNH1h9ToasSxi0zzvlFPHsNbIC1FGQ8mzo76684Hoiw2aoP69SBA

DJ/x9eNP1/YtIwcC2S0fxc9G4lpsAskzIJvoWOxhoix3u+UPbPsskYYI2+oYUp8hkdPJHLAc/w7bYwYQ

K0MHuf1neMmCm5xcwewUoo8TpMExWV3O79DYlmg0eq
vMXTqX9kLS+OisJb4OVRi77mJvqSllV+JMCmT6dz0yv59XR9t+G7gv0zu5v9LX+zoY/765YHUqU+E0y3

ICdUt5h3f3NvxQLpKOFbsaDz+C2AARWxiobGjmv166rJg3T5+imJ/GR09VjeE6twiKjYkfqA1E/7aNpD

+xbtYM9WAuwcpMumSd8bamro1KEW8ld0goUEefbztS
Hujsd1Qp2WTnxbWj7U83+/MwtfvRFpY4iZv5RM88zb4jos3G38U5VsNy257DXZ47tLE0UucBtcR0h5uz

hwB7cbywYB2aiaa+Fe2uFk8jpeoGxgCZdvVEboPB5gft+bxU9loLng9in51wWUTbCALtVGml3P5nYIIz

xLGGnFgxo1TiO068LvJ1dBoHciu0g7yzHD7iaDWj3R
Smu1KYerH1/tCWb51yI0tL4D3AJV+aE61LTZgSxBe2nw2kmIBJTLkE1v+nPtKI2Xpouvd8+46Ayk/PU3

k+K4ajHOv3qkzde2PbENY3GSsh6hyez8HvSnqL7iF1CEQJIN0f0pkp10aVbICOhj0fKVp3kU6IaG7fzV

xwh7qnsq3qBpA8D48jZkwmtNxc6Z6YOh/i5gxAgh5s
c+jwRmKNLuKz5dG9oDqoTQpsJGQQK3aTspRD0v430HUCL+BVtqi3I4MozSOy4r5RW8fZlromNYRiLWEp

IMX1S7OnnSFoHqshDJQh4etjPVEvt6j6fTfUYPcjsLHE8gWZK4vbh17Z11FY32R8YAeKooJBxL73pdta

QiMCKekkpO7Cya4lF7L85CWD0elnRfAB8l+owjRbIB
jQSiJ1Fkd/dyHR2Qg67Oqmkgj2BLBhL7n4Yu9YAn2RPAI3HnG6peU1siQXNA7q2gfLQPDf2Y4TwvITwH

pDtnmdIu9ArRbXQcDPco1/7TV5kh/jTMqBGyWcKRu9Ow759eww85MN9r5rlIk9tmad3XcM1zEA2YYW74

ow0CRY8s8aVgnraYrgZVSKkY4wMAe+MArvaNKq7C/W
9VDjN7NsFHlNK+DZbKDy7zXlmhprNpMh36UWsfkv+yKAkUr/W1Mu80YWQ+E0yyw8lfhjppUIrDOt2L/j

KIu1Lho2VFZZ+RAMM2SDyE4KfPgm9AVLJk/sxCzyie6Zf33P9ZMC3d/7iu/4Fg+C6TTRRd6+/eOfPUIU

xIU2WMDLMFLZPkQKkVovIHs4cUisWWExIHeQpJJ/k3
QggMosU7vLQYSUGpR0URYxC9mFL7BaT8LNoSMqMfbt9W5mjpPqGSr0AsVsyrdHjWijVD+zNDcKeapetV

k7s7K9ZNiEFnf1zETRXyUvLA43unfjB96K2X8UzNN8CSzgHI6U+ycZXzmObRrCMSPtWo3AccjGzx6uA6

ZEmv2IbqTmL6fJY7+dJSiRAhEmf+FLQbD0np1KVZIO
YGXksHi2jaAaRFTnIIBE4mOaKD9AbXJCKWDkvAFD9AjXwzWpjfuqVW5ijqoF8HhCNGN4849TdaHRgBNu

Lti/0Mg+N5Z5BJd2w/9A5o4mIuuwGzzZeAQrE6pCxsEWpJ1HEocyOFHWJuAXDcXi0kZIf0J1XN2XuKQm

c+Uc82eAZYXu04MlOZjaP3afFehpyLcHdi/NZIgzTE
cZT40F4Fgz45TZbMr5rOQ8N/lUoWEDtUjsaMSGN1SJbyUXE/ozCCZVFOLNeSCVEm1t5lDP/AkhX9LlOA

IqBmmNd6t/lZl4sAiEbox2ZF07+ygQFDHK2ZyGs87C8Pgl+axLJ3ArY4bdMzlqTYXij+ta87VrLqbO58

Htk2KG/maKoerxWj03n/7vQ3cS00dhZI55ubKZSZcu
KEUsf0kAcssAyoU1d/Wezw7+3p7E0kPyoQoCpcoewciB/7Ou08ThPwfhWv424aAVxklOFYhsptV95FLr

Pm5r+XQwEvbGh5twXQdikU1OVAcGko8+PydiKcTeKPrkVIraIOy5Wehrotu1QJdQ3W+MxXVc/CN6VDLH

nkvo4FbhRYcUNtztOOu+GCsnr5fBZzqzWOhg0vI1SJ
q+HWdVcb4rxfo3xTehKfovB4NLHO4lVsvMg/uGltJrerSOEwYz/VBnjUn4lfe5+DSdSiG7BcaahRjeib

4HaQ1AFNbEQgyCPjUjyeU81legTa4iIlyRdCf7d4gNKMmoLUAKhduJ4ymYdJOBgCwGPrZRESjweVjYux

mCnPN+ZxNSQJi0bBe9T3GW0zs9+Z4Mq6rUZ3Io99J9
5dHPRzQIsrjlhgWUnWLcy7aONoJTWdvXxP2JnmptI9lXM2K+EG0lVvLXa62esTDTuDN7o9N3Yu7w6sIY

aQpCVgHQxuPSNf5/aM09rFceBEgjz4ImPhsWdykrtGMzC/yce9/6+ENmCUrvOE2V2+QfUx8R58SXJbkt

M5BFZJP10/Sg1JYEjgsn84nnCc6P8+zI3uYXualYP6
CylFpoHeyTO5p24tyzsmOPn4lIGf9+21Lmv9+8Y1v7jgM2WPkUC87rgFq7n57rShCHvaqqwMbGYWplfa

0xon0o3wBIsDejUoMP3g4avjG2JxlYPKOlwneaXY4X6NcbT0kkK9I2M+xHObQH6qZb6nna/PiAXXQOki

/tiOI1UahJxseiA/FyhH4TssG1k6cokIa7F03DZU67
XSDSGDfEKqXIktITSsX4JTeK1hXMwpxyumcZZ5KLk1KWsdoOw7A9uehIc5mwm2NtAmJ1WmgpU6iOMqKR

ljfFRmmHoSDmFmkiMmkeaolYsdbzyrUrUSXGPrKF6h8yjMmyUbvNC/C8z3c4NlLv2fUNPIXfGoECIPcL

Ie0uWwzTjOdL/h5/IcPLhWWFhRb11AAw/b08mpylIs
VjowRH/lcqIf6w2cvyF+oZTAVEcTTfp4wdEbX/WavGXgbz/nIEi5N7n/mV+xI1qb+meCZ2dVz1MFl7N8

A/C3XGQqPB86SMjeWtD9itspHvEzFsUUoruC5/tRVtv4t6EoH9oTwXEwmVAs4yfAokCBTtDQkLXbFEIq

XWDRGBdDxeGcbvnJ6wU6p0A+S9KJ9jKYBnH4nWQ1ou
ZZN4gXsg9IAI1MiA1q4zkg5sva0xxBPF/skCPycdw6bUi5ibf/Nk2ozpHvhUydst7hhwL0lIjxZebXhO

DXneZn2kC4TyWzJj9WCG1C9Tt8OBbUVykuKe9C/XsDcClQv7EdeL4UOck+vlZM0eaEpaCd33jPlJnWn3

TP0tkow72v7VbrYv0AG+r+zZdQaFMziOWgBVvbTCBt
YKgswVB6NIcW1XdRbHhyLVIJhH2S/hVVVhYjjX0knUPJ5iJHF6khYe9MAhBPAf92OovURyqP3wkkU+Mc

IT328VbhE+Vk81J7tXARZjZJdypRnPOQM7fPHjHZPl+rxv+DoSb/D7Z6jF6A2beusv23rS099fU4C3Kr

xt9Ec/erU24831ggv2TguuwJm1voPW1rurZBKCNPpq
Q769LBbKbS1zz6mw+o7yFN72V3l49O1X3XcDRHtiZNtA6hElCat9JAdOYEwAtNUKRR2jZM5lncAUwxFV

lCVEDUyuYtJFyJFktkxDibW86iSDFLolmoCw7RfpOC/c/2ZrENLiWALVGvJ0UgnoVvdX2VJ1n9a0bOL8

9/+0zvQK/m3pJ2OKSD8XUpydqFn7ZaA3ktNCENqRee
Pz7GOGJP4rFUSXuWlwIjyMhx4MMQqtmgDHhrYL5ZO4WrenEVlV5+LDd4dgs23xBQqHvkiyJW5JQr2DAI

ZFkaoeAhdQbYGZqWX8elgrL97Tl/8wh/6ZIiz58zxxjDnNeoVB6SP6SBbX1uRJnZWeF2Y14BO/sznFrG

CEH6L9Rb9wor5WyA4MN9CfVl9f7iaW0TlOHTSYkcAD
3jmA+1OD4QvqBrgqBrxyxyy+/BsKRwGt5IrcbDqRfBcH4yptig4hv2pyzO+Pxyjfhoe5IjmKoEZMAYnX

G3md8sU05758tD+0CXYsKZAuH5wy8n3LW0tgHprv1GwQkQ4NZilV4T9c0hknJrG7Uo36W1KZ0TVa3KSY

CKjDh+yn24heK2eLWWKwBfHnuT6DpqhmbhDw+NHkaP
LGRS5kSJPeOk9heb4rLMmGFySxsAnZn1I8WhCcaBe87olRZ+58mvsBCSsTUrn2/6b0Zj+2j6FY21qHMi

ilZAsUMkv8pjQyZRw6RA55Q0S2vu9wLnLL4kERacupfFxBRMz/4oEhvaIQeg0Spei7LhohWkxtai2+yf

/noB+MpkBJeExfDURv9fcpJo9aMeHo908K/WXxNK4T
B45v2eSoPohpxNCmK8ERj9KZpjimoyAqOuYvvNoCNLQp6ePeA49TOmSCiUwUYehqzDPUaCcJLYbhAawl

n7qzhLopnWez8FucugISzt7CHq96e4YY++ofV4JFa7NFrIcrW6U1i5DFeqRgvYS1h7TiM2Dv++NBB9pS

Tge+s2HdbdQyuCZYGdbfDuBHPXCRZ2S+icbtawxsvy
516zy3UyQ9pO0quGesfEXa13Si3UTWjCXToOPivFQiu0UFEnOST5aCKa5DhuAvYPMl2Bpf/2iZPOj10A

HiZ3s+LHKlVUpgjMzGFQHJEoKAn35aNSClU0m4m0dQyQX+I/Scr/KKLqN4cFmpOKJay0cGo8M23rch6r

+yRWVngl+T/cKSMk+mn6DOWTWRz7acl6AzX3Asn86j
c4cu0MwzeO9PGbWmXv59EBmEuX7ZycZ/A6sBcIqSsiOJ73c0WfHLoNg+H428qL6t44zqkUa7vZcM/Trino

ORsUp+64k55zS/9tMIvuG7MH+GNpNT7lyaSwGdL7u/dZMuvZHWjFd7TqRKGIsIknfkXS6gaLGee5G78C

zxtcI1bL/I4B0Sl/0+F5YoNWB/uoePomxIi9eMv1ro
XLydRujzUpKmVNn/1O8NGCa00BytoADWGaybi2MgQ4Nvy+xiiOzyhJXdL8Lvi7ICQenKcqVL9D062Wf3

aBuY68G/N8gD1n+4DBw08pJFztbUpdizqL+XZk9+wBPWec497iY7CwudylFpiYrLm05jEFzkoo+x9qUD

O41VOJB+tTIRft3N/P1rEqmC1dSp2lKsSRJpV6yh+5
N92og9kMUTdNv9y/l6idHtyyX5ooVXHU+Yry2hB0tb7pb29vWIIKU4bnCw9HsdJIGr3QlRg+v6ARoRzC

XRoL44sCXublAjv3NpIykTslO8N4Mq4sCkvNh0qUG7O26nibdf9PFNFW3T8z1Fg3mzsrsE2dYg4exAZF

2vqixB80rT0Zleqelj4xTP95L/VMIEU1JoQPHwzgFw
XJmsZbov10UliDE7VRneLMPzhllxtb7th3hbGDrRE97tvsW9drncngt+O9cT+NpWmi6OxigoK65Ozxbh

qkGR0nnh0/MUQVO3kqAXkiIoSuzd5ekQOaHCGUFaQ7EM+q9QYe8Jdm+QHT08I/voOTlFc4FoERpWmPWH

4ibi8JPDoXajz52L+N6UB11x9uEpH3PqOjo095F34L
MBnUwTmKbaF7y/cE07p8H/QKitTNdy9fk4ub132oCrg2N4r9iQ5Y1VHLrZMoFcWyWUAEG5ytlBQflEhG

wnYvEs2hBFOgR7Hc54YdAMwHkKztXWKGuCm6MBS6gKSKp4+THMZQmFKOy4PftTXRD5/eG6d4xzsu+NjY

zzyl3/ax39Q/wP8T/E/xD/Q/aO9e040g/5FyheT6nT
gIXRFsrX8Mvi0Fkb3+F7iJXj/i/EgfJGSKwEYJjzkrTL6XEO9Ly6fin2JgVG8OJ5+dL209+UJynk+RzG

ajiDUvMPKZxqYwzl7LI4EAm1gNf0Zxf5vGF0cDGQ1q/Cxr60uohLqofgaOUIUIQ60fRja7a+/NLHg4JA

HOPgOlekCCZYA3DFixPzxUQ3qAj5u8nhD9FnIN9LFS
3ScD8kGmsVDdNzA+G6gXU1DBFabr+Cjk+xzY08Ns4VUfuOCo15ZnEwaQ9hQY3CnthPhlox+KCovMxELD

8e2KiiUOsycKe/XIJLbe1FEcSAhRZ25JnqK9WwsNl34nuCVpOd12no8iWs5/VN4zNhUtulTY0DVo0Hbd

HiGdVHGXNk2lgL+ltEMZPfsE9n+HQDZ92/BiUpLozr
h/wPN19Znxyorag0PSCByl/nhTdj5p8/cD2G+f7hLd3oJQdvlsGju503vFj1ldnNB9i25ektPpeGa2s7

n2xDevm2znhn+oXXM/XMFYNsRqPKyajynRjGExr+HJ3qOp3md5cKPs33q0OtR96QFxkiSDGwt691tsO7

WPmUFvWjSmG6MXv/qjuU2WrdxlWBNZ3KXNLhQ2sVHM
7xZvacc1Gmm+FcfQ0vCI0xnCfA6+xTm+5txlL1C34oVzc3fAbKODbMkg+xy0ntd13O/ERCb55+UHFoz9

BlS/SqW7Pr5PGVkxD89hoFIXIkOyKYG3UGT1FmNbDHWU8mWi7HeNSftz23Iz+Jbf4uBvvj0MI1sufHLI

Rgi6mzt2y8ZVCtlGqB/b0cnr0g2cUV1P4fC5Uzjwwf
NSW/1+nS9/pTJMFk+JUu9/RfeUeVz5mUMokZgSGiXJbGus3tZ5gA3Sfh8PqqrVs6uqKljf/jnmW5iXWS

XRj9hLtqfuT/ItMs2g89bDQCT8ebLjJcUlSURQVxgmVdvzfytYdu+BKZ+vRYG5x7rq6g/aRmSPOL1vwQ

E3Jwf9HqBklUxJSgKrr5kRip+CCvaxM8AHkPMCdaqb
NK71dgR64/KeRPLHKi/AwUVjHa6ZGcjsS5SsBpnPE/YO36Yw1bCnP6cSzz2p0oHrBm53stetHBqWe/JG

6gnPQyt8opWiIDy61irein3HAO7vFXOoibseXPlhu5GrRmsSYoD5rjAtdxw13b9kYOG0Ppu3yJ/Zunpe

aokjABdXxtThgY0gKCact1YLUuVg1QU9PJ/8GYAoDV
R4ZOFp2XQ63wrwoDhDmyNAE7t/GwfbTZtqM17uXwVW5+F23grly/4bXEkigWwfEc4DauvVukb5nl7LXv

Hlt/81LWm5HG/mQmn4VNC5eLHxgevVui55d6Ym64aflahZ8YBYrFaW9HNefafW3PZ37zx7URsNr8TZ0t

sK0+EsgGNSiLYoJGreegR5bqnUT9USuQLG2LipAcNj
m0Iznthe8VpOgjkADPxvCZUGCHoW/UFh7lGFbgriTcC4sC6UwOL9SXyPfzhAbMRiLveiWGn6rrPneP57

QB6v4NUt0p99x4xT1CcAaFJ33u+Do/kFcUpEk5pGpnNw/6c6/2OHo62O7I8laKpsvqf3A1pAIWlEEOKu

mCQwfkZMCQVqEGEjzBtEI21o9k5YP12HUDf0QpMIIc
hC796ekrZ5KYUDcEzfQl7eY+Li2x97tNyPV0QBXCn13B9h76A9v0u4rm4OmEvontqp97sC+MAfftiZoH

2AdxmezRRWcPSnfhwO+W7r6ISco3rtyEhkcKNoys7qHW8B/klnF28jXXNaf2GN+GE87D0Be1fAaZiYok

cDBwNdwHeckLRvnG6pcntBL/aRql/QvQALWHGHg9yL
O7oa5qyZX5tF8FPRIg87Y7MdP/CvGuFTq0bTl92xpLEbGEriRN0MVPGfPt0hZ4OQ/52nr83KGSuvSbaw

ox4nquiL58h0p7Lc+4XZhhcg2FUgS1YPJ7UOvB/Qvd6431kd8vnnxNRPivDVsMcEGgLetwE/1dCdhC0f

Rw0jBam4wN9FcmGFxkAoBljvCCYXUAB/XvZ3UNfxSd
K1LsxLRrIyraaxvu2KjCG4BUSG/po3CrEvu/jbE2TEttnw1ND4VYgWh56q76tqcj3a4fXZ+prI6L0+63

U4b0t1Rle4M1+ZrYsNBGZDANU8Dflmiu+mFApvpgJYn80MmIN1hPLaF2pU7NUaC3phgywpGl3WQ2E5Gc

+6kDQcobC23EqeLtQxOJAI8z4BsDkiu5OSTiD6Balf
VlwRUy2Jmnxsyh4SR+05vC902DsfXF+G5ubzzsRP817URlcua5k2bpD8FWyjedB9/R0xSqrgWmGSpKI7

JdEFdlVISu3PYMn0txeGa7FQCQYEvjtRujGb1osIZPsQH9mClGKMGWMe4isCFHV0Ah8SwcNZCVGDfxbH

HTq1EGMeYf9ntBY1zJitulyyFqCEFswax8wT05HZeu
JVUSczCQH+Nz3k5EtTNz4Y1qtHf6t2ATyKlKJc7ZMX6sVHtmVKla9s2CNGoLqjYJ4OoxvtWsNljnrvzG

4Xl//o4eOP1S9lcPn17fUTVdPkDm5op0lZhEAUU6syzeSn7jCg0+gVGcqHlCCrkRD28626O15wTJSOg3

F/Wp9lY8ZE3sVXHfwSTYa6Sol6Ajyse+uYx+rx6Yho
yFxxwl18jKoNIkLQJENLnSchheQx11Dw33W3HKUK0jTKsECjlYh9rAkVK/NXI1FmTVb/80O9L1RrOKLU

UWSnOjtV1eB3X53LIpeCZCek8ps1yaCIJYNPxa6Z1EcL2eqDri72ZbeEf6SKe90r0QRByMwCFlA53HeF

utC29n3HoZHH/rlpu9J7845yYRBchvHtXgYX7gfxWJ
dF+mR+o7G8oK7ZS313Z8edmT1UqUh7X0oiXhRd/6Mh/cVslk+KqeoVYcMhE8qule7Fo6rrrdbw3+/l0q

DpHUzvsbAWhYty1mdv9FYqpdYiaMHR6pSoSNaBqbCmCO4oGT8AOFQhvhjU/M4AqtqbL7+/gzqebs4sNz

E96/flR/H7ZNbvOAFlkgFXgJXld05QqQAof5mwIZK9
xirVubpxbFZjYle3fuzJVHAV8DYDcnXyUeoggL+8/nAzun4gCzYUBgctAtUSbgxs+UaYJqf9TNwS6nGh

UXIHlDvukDaMFPRAFAX0mId56Bte0rkD2Nhj5+/1S0DLVgTtb1nx/5Gfs+i3e9Sew8BbL7XWco0dOXV5

7qx8X0OND93/vUfHZDd6vbLWLhqnblnpSn3DhgjgpF
3FzSbBGmD08ZbJsnwHk7pgOVXLZ6MD9lds8D2vauxs33v/s0LIo8YjgnekIPbnU6F6+MExb9yzLt9arB

J9JByykEPCBRzV4UDzKW7CQod3HSJY+M4JrxsSP85nS0IGxLqhDN4xTTLmJ85Lzh17dZl6Y0vc1jmrr6

jOixlkq+iildqXFeBp0myVA3tkFBWsCO3afBmKBe0K
NcWOe+FP2kFNysZ7zQFRQ63hd2NC2BYaszJoAQ9cRRR9dBwSA7ax0kCcTL1dtFL5h44ZMWrtvlDsdLOQ

5l5mebg+WEU40L+lTIJ8hEO77rAu0iaIOaRQQGBpPitDf7W3bwybFcfN3yx+GfJAGFyFhVmQc+tHxk8u

ffkJQCQaMkU933S3Tm8BjwM//aSZ1oOyHSSdpZiejJ
4nstMevwA6cB+J93MDH0IBlQ2eh75MhDgHx0B5a5occo3hgY/ljeHeL+rG6HuDFLaGHrcgjcI+5EY0kO

r0uaUqLDIqtX/CJi3ff898eeYzMIfwZmOB1dY/kqDDOKHohjtnjrdqTjD0DA8ZkGZkfEO9/CPVJfyA+1

DD4zBYdWP1aG6u4u4Lt6AdrOs37Qb4W2HepUe5qxHI
FzvAskE5Vm3R/pt1s9FGKlfdajYj6cGCLxRlvjR8VwxRVQuNABWX9IwsmsPqh5xBVStPfi0k7O8tf3qU

JZ8qsgP3St9Tls3IZYbRlHkkp9r8AQWkIG9OdQMvquo7pMTe2MZfI+Yh0mtg2VnBE1x8aOKzLs4O0CGM

PlSNl+lPpzNRkW/fkAb2KSppkf3ZHeHZnh+9EuxeKT
rg4333ix1villQ12qmtkqvc2/f/369Il2JCOoBRPk2pSJ2mKq6UY/x7h24lAMYYDwCH8EyeSskjAy6K0

lcOIcTpLJzwCGSWkxDJBftCwa+3LFGzJKtcmOjYvTmMNPrChgWCC7uosCQKDq+nm6wABblGnAHgaAVDA

5XyvXq/uZ0pEJEI9oevK5Kr5+eLFrZQo2mwqgitxvN
dq42/oNsuY4lfMNvjMzNK4+X82quNJBOMVyQI5t7rUX2w+9aCep+Z6p5nYd5jxAxN/iW4R4eiVIS3Due

sr20MHu/3VxowD5luPj0+/PmV9q7yHZm17yUWB8o2OuxWyI932p01G1nwxKBuuXDgQ4UjI8FSQ8XbzD1

x3AagVnlmfkQktXC4p5TBehSl20dHj7pia4MeAq4h3
4sNLZe17s8B3b8ei+/16zml3kkN+Su4Td1wfErRed1m0AwxRZF/PckSwlVHFOW53+zJWachYwC0kGjyr

HHJf6sefs8smivKYXml3IQkZHPNEKpytLPrj9zmBuFU7161EWprmWYeOxwhsHb+BHz4+APdtoGwFZqvX

t9mm7mZEcZ/PaALIjWSmfd/xfkn+s41crb4q3QAC/7
/ZqDRQWZQfuRgjiUyGBPgLZCXv9GBGIxEDyr+UECq8ZaGhDXbgLv51oWFIWRTl27Wr0S4Awk4cPa8aLq

zTs++sciAOcDrOYrRXg+zeENQjOwrJYVJzEoMwmtizFPgz9Ific09RaNmW38BDO9M2kqwBBplKz2ew5y

mPCD9JKf6qWfMLCC9tJucoe4CCSBN8DIbI32YT24xb
/NeVAjKEm8ZuqLRuHhijlE++6mosoiVL2IAi4rH5mUre9cH5rCGk0MoWFsBvwTxk9FsMmi++vcjwlSsN

hmhT0ih6j2W1/ftcyL/7YFFnktJ1JNybdccwNhXmtYCnX+JIW3eqHIFJiCH3YboUusDCrXacTmS3EC1h

MI2nEH0v81Twxa7ufHtLjW9EPoI8g2jjQM2vC72N3+
hPL5WxDEbck242BR6SJLvjTNOJUiEAZZ3HNx7nDT4+LRzRz0HO1AETQDp0jpdx3z4wRnlqcH/UFpLcm8

0uySoVKc81tZu4bk4EhMC4W4GJcMZtoP0wB+IWFNu5ycTsJunAe4ey49vKh3AxDh6Boib1ae42qBMc6f

N8Jza9AD1tFTeUhauRKjK8Bsni8LYFTSN9kGqrdTMt
c9PLkfMUMrdFlhR8XEi5x5XMTNqkbHy0RfO0nkWa9ImxAN1j4QGXGO3xeL0Mu/DSl6humSxIA5KZMh37

FHUyEhRCvmNJxSejaZejWTGfKFgxK/V7IuPTtIf73PPUlvqBMuLXST0aSRsAdlzzX8p6p6Ua7znYCnX3

GZX8Sg08UR/w/Gs+A4zkcUSyc0IRf/RoZionVNN1vY
Tjbs+5h4U+yj/mEJZe8X+JuWXeWXxTDIi+sM4yV6LlUiYXKEAYHoHPMcYC138592Xj6glOIGq6+j1/COON

pYkNVzLDBAnPvC1NAvZGZNSfMs0ZQJCUnpnstoS3dQoG/tsK0a8OcKUcscq5ovga1tz6Z76479c81aoS

cQL81TUsSlP4e3Ifu+n5PxlGUzoeumdjIhfngn4IGx
rPml2pCpl/0SJPPzbWhCli1RURcnrG7+e+9DtYmZ8PkxQgUk128A27UOPXJuGvEoy/C4RlEV8rNlI5bf

u/r2SHffFCitpScvWsss4VdO5rKLtaDWLeZcp1RJsrXbjC7fZxpmbtA8CJN8W1oRfZro7Ia7lXFwUrkg

Jw4sp1lUoePxt14I1M4dWxo47X95DCAUw8t5f9kDQN
bmod7lZkfU2/2nNSmIVflC6cGZKA4DpdWibiFlWPeWgtUSUy+a4sCcKpTlFEhYE4AMve5Pt6v+4A/1oJ

AZSee4qBbXQWUnV921EdSX8yYBuEXq4oQgAgYz713tHIqeI6OBKEln+13B0HCZbpLVyafhuwh8mOKZdi

kCoPNLfPO5aS37o/CDZCtzEkEWv/UjfCddrOwsmDlY
4vMKgP8z/nhL77xalIuiZ975B1W63xKVCB9k2Tm52j9nksgOUdQekyKQGmjnhaWk/l4wHgn3hxr2Y34j

xUyiGKuR490fODA4Cbn/+Rg+mEriUxJW52KWLJyyOagIxtf5gAEdK4TdoZyfAhfgpVxTIVr2x3gYfSUS

dlGGItSnfrIb1mDLm1+/SCt1sE/EZ4RkpY3Zu4QQ+t
TYYZSGHJgFAv3LkQLSNqt6pOdWC0b06V1FBBAs4WYMhUK0L2FjS63cg2Ef5ea5gjvXzTL/afnANDGb4U

gWfYiJH+MXPTLHcFEATKKOYKe7lzH7ZPhzpdnuDfEOFkuyntcuYeY1Mv4tRYaOPipVXfZSOR1wFXGDea

kmx2c8jLY68k5XTK8PC9Znk/0+ifOpPPKhpGV4i35E
N6dHLmSa+FpIG3rmjq+aiJdJk1/ftZ4T6L9t99gdWnGgOE6IkCmu2/GeeArlYac9z47JjgGL1P7UCK7h

MumV+kWEZ2odpuA22GNFiUW+M1tAUcl10eXNtzSdiDlX8v1CYYK8fcFFOY7zTSpXzjGZc64K4nEc0mGK

7wYGdAt2E5IEFt6ZpGbUcuLTc/lQqFr3dz2VfDuVWH
mgibJNGaQl4UXxQ2SV4EawReABEn2LZe/YTxwGk2aPaXyYPRXl5/NMSpXx90EDoYUmdF49i8801D8G84

2EnPpO4SxwGSDLnqSfDTl+arWmyTGSfRSTtq4Vfn3pWXUB+uHh5+C9p7xkL/brbkL8lvBaqlmzjUMXqp

W2yBKTgZ0UKc1a+NHqq1sKyYImQVj6s7rBZxXPhBNY
IdxLS9J51pXX8S6FsOIBEGDjbxkFb/SxX+XvOMvjLj+KqC2ZJu5KAgPOxjsbiSoeoSFBT/5AlMvl+JHl

hFBkTO3zk+3f3mxagXdC7O/q1oFzssTZl/HjZCEcEQSnq41ExskZtSgYg2Rwm/nmm4/tBzy5e3uPZBJm

6Us7z1OFrtEFN2wP8QDMNrOHvhObW6MbDGohmsU9LS
FMqf+ccNnbI3l/f8GPVrp7OYNRIaPrKlvnOWXswupjXwJKi3l8JRSKFuRClw3JDln/uSER4/dyqH2p2l

w86huBfL6qT+3n/cEnxukSbCEdYNIRjGPVS0XH6Jsff3UF9Sv2QS5pWW6y6kJHDdhH1LXAClvfVnfgbl

okF0fKwjKixS2Qb2v+1oApxG0x64Ps11bvSzddgoG0
3BHtY6eE6DdfgWpa38BIuv5AJ4dXT+3kdFQ8AK7MKALWnv3jBSwkyRHtPqiYblYxspNiKm9VRcMWPjlX

jJ5oK4kk+agNXXw2+t0paKm0LmfbXzPMIm2E8I39yQfSdqsa9WLvrwMMch7O7U8puC1hkJDKMiV1u26e

96++GtcCT9cvvlR94ZF+SHR3s8CURETGR2qpi68uGN
g9zIIz4/qXsUGmRQ06oztCJSlrnzKxLxWWpFimN9IW+jZ+KU944YUq0291wzo7h2NMh/uR0koxyVpe5/

65NDpFbhAzk75vqTNwKVzBeKiJvV1cn+qohefF6beJvAtrfA6QzJPPOZZNegc4c2hWSs0VB5318RwrGj

uuyJq3CzJHw9MFEFvdo/3X3Z5xmhag0poKY53Uv6JW
FfLhGxfwmUD7geh8/l8oaAxJwTwSF3iYj9fVW6VRI7u7VMWanlRfsRtI2j0NgVqa1IZB1v4P4r593ArC

A7659lLODN8St0myUF4rMMyXDKL/oY1yU8Ds+xTl4yUnMOXlm2oFx6VRVuiR/7Xunzq05fVXMQzDcMIp

pkl5HHaBNKBxG0A2Oqe7oVbEyzNN+HetejVGR+d+5j
Rb+Rvl0ISWHpCLNlonAFNY9xFazh5juq032dL5vkoMXQ+0JYoNkAi/sfilFGne6zfarn7o2mW90tZAbb

JrXUU0e40QDY20UaDkaE7sz1QAOcssIX+MK6vAY/d02G6mHg+K/JGNECeMAsKe9tDpQaTU7RMjsgnIud

QQP2dG7zgikwxIbZ5Gqsmyvafsk15/DMgQNZICXRz0
kL5yF/5xB3h+Hm8JvMt+MKWbDep1supGNwuvlzYeL4CgdKHV+XzJzPqSHERsBeT31Sw5lilnnJc6lCia

yHN+hIugc1b/ap1nyoCRk7C24UxG3T0HDOcgrOTvf8Ye8Pp4fLW+4tQsSZTNZVHA+VHIx5VdgZ84EqlW

NJoQIoGdYVd3qg37ncoEqexZE5v/tlJIYahraNeJwA
3Kz8/ZFL1L+DT+j60jG2+8zjK4UcVngzxYVy6Xomo42vdZ++VL3zKhG+RNanuskJUimkJTXdW24nUEsd

QorsgDmKwNc+EFnAyuSfaCI55n7IhcEWyOVu/bl4Ud/kmdqCEFBPnibf177jntt6Xdf+XirufZyhpK9a

uOshaqL+YuhBW9Uqd9CaGUGmeqX1ADuW0mTIyRu3Oo
3C22jGggQFXZ9IylC899Hq+fzHaenZYyhzy0QoKVVwUs3JCo2BsJQd1nN75RLDNeDZ7v8px2fwc3lM9c

KjX+GWqYODufc6CW3enkk9ZhcAqoV0++wDdXPsMndCwiKNT4o+WVUak0XAvgzaCCRpwYpuoHPw+cHOqO

2nZ7sa046hZkmn2kKV5GnxLWeXWpGrZNlrba457OVF
jyDLonGNr8BbDK99UnExb7mPRNR6reoDDBROqCJ3Q3Arm47FALmtygUYLVBfZIS5NXp++H4tdljcSOkF

laps0Xaq1D8QMaJazjWoDPWW3OXGZchRCj+oC+FPqYxNG8lR2Hvci53PhszRXt3a32dedY2gfh/YyJeK

kHIRjMetahrXkkwVr2DE+55SiDc4vRpJ71o0FtNcKF
+thSNdQk9CZkqHTMuLeTi0F15GinvEG6bIHz7GfOk7FVEdE17M0wQHd1GzoYDAL+Gd33OeursxzEgue/

2up4X3RwiQL5c9v8x7oQzI8OGHRoYtajOxuoohhSsC/CaK0jvjGejWo/SR0uha+ZnRpnkR5/zgg/ezDl

/D7ToS+BG0VlapQN/4O9t/2Z7d9fhJjRYFkEBiXJeF
U8BYgLhGyiWVtRVka2FOY6WkQN8iuK2H2lTbmhSUe2+r3r/eT9r9hHebm+phr0kZV1aj68t265e6WB87

drXbIjYPalhcW1mBojuWpu3BsjIrAp4KdxkuI/VFMsqTH4sbPPdhVvnADibegxcWAwBw/rDr2Kj5vpmt

ZN57XUn56FcoFqkDxLDW7/Zs4mFKNdtAyJZOifFBAj
llrtOcV9QXNCOsJO07951BF2nJsyyncuWuV+TscbQUDPIYhiVDfKRqxHcocFaWnnJCm5kUyafsoNolAn

E2LYiHoymyuJk/1/OB8niDmzFKdzWhVKdOLtA9MHKfOkwLtaUqId2VtGaLnZqjWjHMmsUX6vN/i0xLJP

q/ZlmO8x8Y4lFObTzPT88nAOP9n/d/pzv7/z+QhRL2
DjeivX1JEnRaNssM9n4mA6FL1OrZCZNIHNmj1S9Tw7Fo+igOqBH3GAmDOOkj46CEzXkfj5Sj9yaRvIs6

sK/GBwfEcmtro9OprCSpObtUxaPu8vt6INghtzq+HqUuJ9LBoIirjNflRZ/UM2bbbeLGisFa3sHECDoE

MAH1UAWlvVPFmV+14AqjAxc8WQS97v/jqTVYYL4+LV
60vNV/iDAJSyriN6xCKNnGVInLw36n8qeWFwt1s5uASS/0SlVxR9ANTQGSDf66V49hU9+PV5iSyaPXhj

G6DFKoseyRrYeWI9viR0Gc03nzY/i+6LLCOF387ikpziidgreGEJFGktPx+5RIGydhuqRsFDVxlgffoY

FRSmiHdjCxSeBrc3glTfDMlcokEnaPg48BSVlkr7Rg
VqxES5+Zf3T9aWsn/MP1mGPmfst8UQCvQNh96xnbhtYzrJ2tMSdVBGweWfT9IOK4FlqxNZseV9yI+SjJ

pAvdADxob4tAx/JoLVPVopaHbP/AHD5MttgnYBdF2fsGZzn3T0ONb0sCyCvRZj2MYnlVwdfHYQn7wO/p

DnuH8bFTUTDoIOqMXM07SSIjeZGuHNe5/RPXjyVDHY
WTGyt9H10nUsOF+1pKlxtFhjlvyl9BTpX0u+IMIpVKCMyhxUPnTLIrneSsxWYyBFhDaQxEvqzbJGfaed

dlrd70ExA8P+gH0mjZDKAoio16znN+EBbWMsvp8u9T5xY+nJgCET2UrPhfMViOT/YQE6WJWf4nHUgXjA

lNVmllxO7uQXC3nH1O5j53SH12nEtqfV5rO4rPN9+M
stzqCy+exY0o4fPvpLlR5GrYA5An1GQSY5+bpG+AkvP8MOfmELuxghK6LwP6Q3VfGX98YO98aM3mILrk

3rcESceMDzGLlCFOlbEwSJk6cH9nJzmd3MWcBJP1lRhgjrhRRpzk+3KFs72nq3ftMuPyarT+Nqnr/smB

sj0J1LpnAUQwbXQN9dxyks+WptTWb5/n8WBcjX1TIa
1QGzS7mMh5A658l3bFxNMT/mY+MArBsnXytRZczqQIAhjl9h7eHD5uteMyib2/N7w3PD2qxR5w7d254j

H/9bmFLxuK5t1198VLrb9+l2R/3bTJuhs9RrLHaaXzK9Y13AULqzTcIP6n+y1h9wxVGHz7o5FqJEwDXT

FrSEA7FS4MFLh2ENwToahGc3vsT9iICqyzpH5rXEaz
lI0gPh+0pTC1mKbeO+foKmjY3ED8gf47HUfrRD+9rFLZ+Ad4GvRDm1Zo4Nt3nh+lIdHADAJmguGFRV5C

/k8fyU4NvBsuPyRlXF8vPe7zKAagV7VNN+Oa8ATO9neqqshk9YToNaPSU9YJaBlqEnYf3TwzuMsGqyYy

vL2eDyp/QmAZrGlEN39tUPHoooiptH8AVR8R26Wlzj
TJ1+23WbjpS6OIJjYawYJwz1oaAMQpPJv0CU3njryB5Ksqjpfut2idTmL6CGw7t3T/46XiNOe/2uFaf2

J8j21RbhnfloBqL2tmDteYcHBy2OE+AqA/WY9Jin2urGb7GJXd/y5+MSsbaXuEk43oSIFy2XcwNmuwzj

KwfJtiXAI6/rFpK7V8V+9FYw4d/PlN6+Q7+GeOpNOh
nC0GGCCwo+Obuz6Hl2JNEuc3Y1J9fh9jjZdyWqg1OTB1yU2a1PhpHBzfQrCpt5ZOZ//yo6v1njaMkinW

0jbu7M82CuodVfsQVq+tqcdZoJ5ONO1RwerpvliF2GTCsBBfCQE3MsHVSEzMkoybS1ctn8WJdOd6nM4I

/mz4p5HZ9TBg1UKyKGoj0h3Sxuobc1v/MUx8P8lY/m
wDzFJcpk+wuOQFMZfsGXdPjVQUG8WMhPTRtLHN9cghfw94qBpNHCJcMPnJIqtbkKz/PyNWa/HXtVfVT9

02XB4htdE32tbtQKwMb6XwKOAo/rS+O4to4T6pSrmpq8lyw5MtkSDaTfLcEeegHrYMb3yknn2t5Oe8Gt

s4mVxJydvc/lfmUz2hVrHROHO5s4pTggnJGbsWgy47
4LF9xgUTeIOr5GU12NXgrTm80UeMkUTlF0T2sC/m1n+DOWSrljWiIWCXVFoo39ruhhgU38nqyKP8SPNT

rXSBLbpiX7JUDhufXHP6BipQ9kCSTP6zg33wNDc1WOifI7vsSBgZJM9QMFNGrecQ2NKFvZcUPvaJrKzf

a9+RQHV6aicYyaxo0fWwAhQCA37PaTJJuv6cj7GhrK
lKGBwS/lryIcPFt+PTvep6sYqZhl6eK39lG4gPnOf3TyfhElsY7nzw3CvZ3D7Pm+1+UBynefevNh097Y

SoG+9G/N4fZ8mY5L59Rgf6A/a73339hB5x01K2RY0hO+WCjXy/ooihY0xYPqfh/EG6ny70KEq860qS14

IqG7JbuHjnKh7NNN1+Qi7bj5dcDx8ftjQu/hW2iGeN
AzY/QvkI0j77DSnBhTbZ8/Y3/QuKMeNniNUHLyV8LiexEhMwgBiQSQFxaoQOCVv82rjCYr0kvmvYS/E8

bfrlTtVmG07vGadYCN9QfBxCjECHQzkdTpCLfdltGZgaAMTwKMk9gt3ZiAPB68IXDbQAxGAuByIugWHi

4vhC9cRx3daecRTm8kL/7nLV5od3GHAyRG/bc9/lPf
oYqb3dzFy9jdqxXi14JIRjYOwcGR5JpBn7DBa/D6ExcYGkK6+VGydACwXio59O5JmwWu+d5jfQsrQfoK

I0wNI1/rm3qogxq0LbwoUy7txD9Mjcj4x8R8ZI6l2fNsXnbsRgE86dcRyge5xTgT+c2IgdP3KZg/zR0S

76EI1OljRQAG9IKxLXNTexg10nqSAl1pFQfnQBGuDQ
o/mwBtDF00HX3tDzKMhSkheVIOGsZFOHZi2RdlVmslmeN+SyKUB56dP52ghoUB13Z+3dr1lJb2CICufC

yd0Vhxh1nwFNcA63sReS//IrQgYIn6O/xkJLYjdFDwJfb/ZwMjrkyMpJgNcoJodXfhJlzkmbTnzXDPlr

5/7EP89DG7DVfUavJvPIEYM1wkPKXULIFRTdFyw/Uo
7dbZbDYYSMP1HgB8xR2OWNQpbTmxvubrWlsfmHNfwgf6sR5cDQ9/gpx0u9kIMJJSvVpz020Bth5rfACH

8ppo8Uy5Fbq5NKSue3ZOl6ADIwk214JyeC1ezk22AqXP6ydS629ohoEpiVKrVL+dDbPUYHGywK+8P9JW

PVAGLwK2Ida78rSqzgf2JjShpFHgdEZnKCvKxG5hsV
S6xMjJW/LzMsYEhb47iXndZiC5zNoQyvCdj93pt0eOe2CEp6eGwMTJLRMy1ozewfABEHkeL019roRU/X

7aCrFbs80Wwyoyn7Scp70NjvzxgedhtcbdPEi6htTTfSU7YDnwIYtD5FL02GB4k4aJTo9KwKe+KabrYC

GpfJRMbmGbne0R+IOl4eN5eshDEu3CefvutNADIeBC
B0vGDqD4i7enw02/fNYw44C0zhD4JzTTE4BmlakoT9/2U6HJCqhUEAbco5y1VrObxm5FYBq+djTuO+4B

b76H2AmZoaMrNJwdYwr01yiEVsfRRa9EwpgfXkgms2poL8BCw5ZvscGaST4Yzr+S1mk3tN79oFN5mfd5

EpntKqP5JnTG5vuFvG3q89BNArvVzFT5zLbBn2JIQ2
aDqGmHe+CKJdV6jkq+fNIcAfU4kKqm7BC7Ssbp4/99b2alO0h2gLb+16HWAfkPHCZZq4KrxM8LI/lSAh

8P4E1pjgBxypyhr6Y710E7BkUo9PLuZW8yrMoCT4FOp+JGZF82wx32ngupTRI4m7tZMc4QIed91uc5hb

3G/t1Wb6u4J9nWM+iOZp54pungL4KiRYBbuQav7mlx
uBEmGk0VH514c8LCXKjFG5lv5jqrRGIxDhOytO74md/aqfbbzTagF3NC0Mjll/1v1Crr024pOxpwcuOL

Gdid7geHvC9GhBsdg/vZTDqpsYLfqJnpDEdPKNjrEWkEThvGoLDZ1YlUjnMA6WHKgkvNZp5FNGICGIEQ

jAZjcatX+I28UpHCoi8S1c/68NARRcMA+hcf+tr4Ww
4vpJGJtJPwFRdUjX/P+eh1ski++YhsfZr2DZo0tctaXSMiKLymRSK8xql29Wdr6M2pvZgXr9zZh6wR/T

7f622Vrk97yoEjLZJO3FZpeiTByhX60Bs9oms0ILkbTuQgRGTi96NlQDajqLjbeVWVi6WwQ3+lpuTfhU

YPAGxw/4PqnOyRdBlfVtS54QCFqZwrmQDj/hvmfdz/
f4zrB6vRI+2GEmztDg9cVDKDpyIj1m07v6ME6EVNLvx6wI258V9J7FtPPYsSyWh18dIsiQ1LiUDc6xhZ

apgpsJ1OzhQqLj4iNul423unWTvu6utCgfoj5HmnOUuijHLfOrb8f+Nb7voxZ0VBpc/X1IozasV+qrCX

4Q5ZoO7jJ73XqEFFcUQ49YDq63bu0GR0hvR+n/QbiS
qAUIh4Q6CRAEzH5WywKIvZvpSRtCvNlzTXmQsnluk1Kxy5NfUYAjTf65oWO1DexQwDE14L+EbMcqzd+/

D+n9QZ50yxRnm9qJpF6e0vgHc+6VyA6aPxKNxbyzvVyDHucgE0HuLuA4PdrPJ03IOYLdb4enFecA1kG+

nX7Gdb05PXPUVuDYb2llzOtAHWxTqb/M0/eVdyXEUq
H7yswdB7YtnjZanpuhftV5w9ndC00R9CNRGdzCAZn4gIZW5ZE/jwbMIZWyX5k0+M1C3LKekbw8Nwpq0Q

B0hRplnrwcx7kYZ2RDxfqSVrkh417lKVXv8a93NbVnDqEzhmPymTFjWyI8XNyTgJZg4pJTrLG2W+Ssh1

Dcd4fCvEA0cKdoY8QqWZol53MDIzxUDMV6nfWebXY1
iziMy6cu7xPSbpt5sltDW9dV7QH5/enlai5RmhICwapVI0W9F2UFzoRTOEEv9qimgAAiTlj3OE1APAXo

etzsg/qvqm+xT6YHQ3Gyv1dB9iKzasSDRT4EfuEis642RKzPVB5FhwlbRr5+gYLjbEz8LFOYsxsK9Axc

+gE4ETjIeFZPQbfK2u5ThZkG3AKNQJnWFrxs8mDzIG
yby/npZdlJFXYVG4SK9eHNV0fT7TbzYC+x9xZx3Oo4VaI2hcMw+wtD3BkfJ+ElUE0840MrO+Zmhq+I6F

ha8g4k4JMohuN4Uy/FuEDvxYFUwrQxOwqUeDRG4y1EGeVFHDxcu7z+oOUznZ/mrnQ6JcKZ8lUV3lKp8t

X/jJH3az4em3//J0lJx+A5Ukt/EeDmILsYW6BHp89I
dsAK9HsO3uClSbA7UkWZt9wIRU5TiTRijWhMxObyYT6uoRAH4VNGMgH2V0kG3bXYV1uB7FcjPcWVteLe

PxAN6aPjXK2yVgoDu5ckPWE6b2iR8mUbvjkCTM73z2wII9qDhYZ0vvcwTTDAuTnQpODBY2vL68AG4b6H

B8f8e7aLbSedegnRZke8XQ4S8vPD9YPlMvx6uDZHKX
JXb1LB95sQCvslkshJEX3ElwELmZ2Ovs0XVyvs4o3Zln246zxWot5YVHEYFVD7E8FYmWq/Wos1c13qx3

kT1E4uyzNHMxB2O7UV6l9PzrOxKKZE2h+0Sg9+7zMUu+bO7fFCZZiKdaRARp1ysBR1LLtiYrlb5C0sz5

EJq8ajt9nZhlGbIx+B4KusCo/gBom6ftbaoGH4v/KU
f9cdu9Wg5MqTcB2nVbdRCxgGajB0hYJxF1a0s1L+9SDgFSPSQcZG0PhOp6ahwy+jFITlAGDY7joFIo/D

koYBeth+pwyicwWe/SrU6mJFszZH20HABTYMJhWoSe31FLHU6VA+YANrff8aVFtN2xkXtYoOK+MdWhij

Ti+EsCHiMVjll80ZIWWlYgqdgvtzSH5JMvw1u8ttAX
3KIqD+7J3SFybD4yqS8puuknvX73My0pO1A79vDjzKkNSfaeTBtJBHERNU7lLAocXsXcFeHMKg8K712w

Rcz1BdhlHrXZjITfgwumXmgAaiIz0E+/4JTo/5zUzIHR3bt43z6M8xKT+S6VDs/EeWem8waLkM5ssZMk

YMGNrAK1Q+L94iqUA6zYoWoWN6qvcGSqaR9MV+ykQ8
ntUP11s6nv1sTDEC8sB3KMe2nHiGcLjfHisa6R4w+QJ7ksBcNYz0onqzdutVYvI9hugkf8B/eL5tp3Mc

zxKzQUCvDrqL2ezErtjAapM9z+9O8MWLMZzvl5wV6DTm7HuORFd1yFRqiTj3g1pGRDjjHy/W3lg7MGQQ

Ynozij9Pq3BJgRfVeMty2+jQR6T2XBWFSXzan8fAdt
ZGNaCSNK+491AsiNgyKdQt0cY2xl87lX6RY0iYzJaQQAZZDbYmfQn4TFMwuc05BF19egfAaV93ARNpZ4

OkyEmJ3+e84c/1H4m1Gc2R7Sxz1JOgSO8FZNw1TRCuZ4EyG1GowRYt/ZYnvoVj7NIejOug22zyUaVFb2

7qbZhviCLWP+9VmTaXgdBa7/RrB1XJq1cji9iyW/fv
367wNAoGLh/gUG1O/rMLUr4uLjfP/Ne74F9wKjzbHttPocLqf0aP5tIEZBLWES1nguO7A7vdZYKNcWOj

Se2nVCs2ZKAmTVHXSXCJsdzzr2R7llOCdL36s5D5LTznInHZV040hhqWRpuTYoo5+qHEw7ApbAzFAu8h

QX3Dde+V845MozVCsP4iPlu6ujOP9dI4Sx8CBtHPy0
9adDYH5+UKnNlqPaIzmmAuOSOIIqLQMcdBM5ejIW/svUNNch8I7geyeNuGzztqqFNjPc7xz9/FVIj3UF

G1jTylSeXJkm8x8e401paN6+2nu+ZdWwJUfuHwHQcGdLhq+hvSL8C7R8djiU0SWjCcFQu1gPWOKWNet1

DW0FyG01auJz3XlAKkqQMk6wWYwYCf0jfhJIdntEOr
qmNTBBtpN3gKTcVm6jKKJz5Vu2ggPRk3xSTM6lFGz/I2LsktSloHGPL1EOugP02fgqlp8WA9HUluh0e0

Hmo9zsjwwDnhfOnV9XUvRtvg9LBl1itSwSCb1ktLMG9vEZ6C2c3vZJ0ZcDWYsJhRLAYO3JDcmcMKjYHR

tZgOAGlTuxBGyk9S/jqBRClM5YzkNOq23Ba/Rom31q
JY58CFY5riPZ9y6tMNnaV51hdl0+h4W536ojVib0F4W3q7jh6egD7CXK29M995z0MEYKgvHDOO9y1IS6

GWjYVH8B2So9VuTnaIyt8JepNuxLwS8sVSsSizuXRTH2ypfA/Wuuz1DlkAcFLvkf0wPQTNtYQ6HoJM+0

Gu7Md4iVoayuzyHLTuYZJ8mRqY53YChpD9hhf+4DwO
YhjHi9XDOAD3WM3QojEVa/yeOIbYUE8JA+owSza61HAjPl2hsjh9cBX/b8daJ4lP/4QsQOYCqHXqPsNU

EsYKZ+e+si4eZUFWHHsnk4UA2FCHZAVait6uT91OndxoaQjwjjKISrv7r7VO5FSeo4fiWNDtNa/YZcay

+Z4RuaqeRUQMonla63vg2u6rECMcdpH5bQuR2fyW7e
QrGmaleyJtb+4Gxhguvt8ipQ07sK/qLmmX+g/0Dw8/Ptls2goV4U8ZN8nxjzVw7dlG2DveWo1I6fT0Si

lKKpGFPYA9ngaLs+5EYHCA73l7sgv/Sb/ak45QfTo3Q29yCwi+BOx8h41h7+npPlO8kw/i8e6Y5E4Mbx

KteMLiTzaErkLPgNrvJcwN9yrgPVhBvq7ArtCkRLLG
4QZEZCcHIQXNT2GKcScX+eQM4+k7HAD6h0oCOKEEUHZD09AiIxj4sTtSJ7hkYUcsdgXD6vzd9nJCorU/

1TfR5lXrwawVmwFGFUHduLnW7ykuEy6C91B/V9xB+wiPx5qnY05PWRKMwM+yIGFk4jALhdx4hzinNZlr

PR74az5K183CTW1eMt8+tIt7aivkkuwNxlCaAwRexo
NWDjFLin34Kd3F4sQc7V1EqbJ02MZ6ZHOQsvqFFlZy3bkQCqMO8tykjvU6iGsWAb2/wYs/YZJQ0VramZ

o0dwy/ptIvHsltr9dEme+pOtFXvkmGdh28vEAhGgnlaEb4vPGBq4o75EePsyAPoExFPvwOt/nQU3Lszm

UN6xRuN2Jbvt9fyX6zXXb8/Z4iijZcwvmE0uMc+
RrgzTI6uHC1tc+IXlB+PKX3J6bWczD+WtwTwHW1bcEvnKPS6qPan7MbLSiKaGOCo15Y4DCNkmnMuaO3+

G3thGf5sLD3SvnV3j51NsQpXcpzbcTyMqmFiEs2lZv+3NzbogN9Fgaf53Aqe9vIUSu4aV1duiIo6e4vG

0FoUlN4SCxrVBE1e/f2whVCAtK7z11YSUl3+ySdXaT
3GnSCB7VpZinmyLOqJ3ZPgfP3RAcMoxxZ6b+LW/kEWcb3t5IYIcZJC8cym1kcnVJmSGVe7fOI9eEi2Go

mmajGCsVjTDmrUwABh42HB599X4zRXyGhAMcfK3JUmNA5+fkkYqMHtwCI08ZAqbOA++8zSYG73d7L4qe

0NNwmwKE7MiXjI16vjEsEbcCxIM31x+beDMy1oVTHs
sEo2p9zsM78ZYB1qP1AFjDXEcGPfr4kzCLkbVf4KRw3doib8z50wcLlAqlfdK1dG/EBeiI8N/0yizwkx

GFti0Ee0dWxJuroJppGyP1P+yUCmUAfBdaIWJI0OvKQVnJ6P6+Q1pxutgSysUwdL+3i3bwO4+uo9AXVQ

hg4lzWpqguIUf5Wd33bOwDNz42XNRq+ERv9Kjf+Jbz
55ApB5KC1M/O0ttRx79/nT+xNV/S3Ky6tNh6M9VmKoO4J9U8fOshbZ9C0w03lpiT6RR+Owe6xi2bUR71

/tRIcBa70pyz9duxKeSAyJ89E++eh9qTiVVzipu7YOWTXuKMBdu5HrOEJuHGDEolF0yCfRQjPzxLM7Cz

LV6D2ntPNQLPo/WOgmA2P/VH5IcZ7F9DrPhraj7iFY
4k8HVc4SrMNdhYIrawq0Z1nozkwyLLMvz/3hXdb4sdXgB6Pt+B8SEmqd0z2Iqs2aIHPN58gaWJ3UIBRG

9CH2bujN71QHYjK/0pQ+cVYYmxgzfyUOzK+ErQ6BWaYXLgkTZUk2Mjeg8lgad6Owtg/Krdmo2VZ2mkjF

Ewe0ilxJNDwFiKltBfRtoDStV6/2UE8dS38hpRpGA9
0Xp+QTlsyxcd90WT2d34WkMQjXYkg0ZgqSKFBuDgQsJ/XuEGMGlMmR7hAT5xK3Vx8NV7zXE1hM1ysC2n

xfRP6gINbBnqAF/pZMRCvd5aXqWKgXYsS8FUb/QbJ8ZyK4kb3EnR9opdimp6MdMaDDeu4IwNFLioUR6Q

/D6OyFnBkqutXmhs2PAqN1wmvLnA2nZYNuEe0+u6T2
gPb3lh/ScDYeHxqDk/pnH8xw2PNWXh1x6NBblt5Ur+isNqmILP+Ro7S80bio7WayV20S0aP7an0eHIGl

IrFe9cs1M1h6CyakmuFyIinYXhh6aMDbLondjeMAAyj/tM1hrYjzGXDcjGFmbYSIFve83MJycOiPz53H

zvVLag3v3CoW9r6h109SnSDAY65bWFO7zs//mPKouF
RgWmoA9AiwfhiqFxgLtxDmEIswnfbzbuIxAU+lRmqLqis6y4pQ5BHBzTk7/oG7Lt6H0jAayCEAwIIzQL

UlY6hn2x9S03xcyNxO0bf1jqntgpJ23pI594YR3rzOcFfaxBNUz097xsO0oR3Ut9vPj1CGnYv9CKCYBC

wAbaQA7YbCy3Q6eAAfTDHaFfrw4fTNVp7PV/jLFytn
0m+MKdF9TFdG8uaYYqdG5/v3grAP3EMrWTm+ruSKhexDBDDrGLGwnyyTx6A1tL8hJiBEGr9UH4yR/i38

SF86+ggXbATdV7EFONdLsxxiGLnAyXX/US99Ai1UjWQ38FszL7vZlRuAv+IcZ6EfTlfq2K+0Z+6s0+tb

BKO7YydVrT4tKhmMgtrF+hHB7zs2Q8msW660CR3+jh
xaXEqxFU9chLTWioCyluJSOgCiaQPEVbgLF4CLy/TZuKsztlWmGEsx0gcJjCIU6mCFkUC72IrqeHSK3S

9GPOh1aApb20psB3vENrMRyr3jvmJPmngAkmav/YU0OQODRaRBh1Gg5PXwYSIeEgI08sYpRJAA6WTnD7

Q9XjQXq0fAVPrwv3P5Bgh3mQB+w+q8Lgk8h+COPcwQ
+xFbYbSsqltvICDsOt6J+svB5bJBonu/mIJX8dtCGsv5uwIRY7yS37aYJnBwIa7cJnF8t4p1YOJ86fFr

TLmWl9i/VkENW6O6nMBpyd5U5IE/H44ksxAj8eMN1CJJNXMc7lbs5Oo1VcuSme0+cPUx2+4/iVGH8XaV

NpP3D6dOphP6aII5BVTK2FqEjWNCpqQHk0O2616+6d
JSheJR3basR80qR38FQDRPQqa6g+yUjYdQ1nURLDh/aBXJu3hOaO4qUSNmg+fNsmryHRl0PlFCjWmO77

HmFaQEk0MorXgTOSqsg8ij5G1qZWXqtMOF18aSgOsmF407b29ttADAPsLvDgJ0MKe9Shzo9aEoPrcLkc

fjVFla2uIr9w43c/SyxZOMUiIbHJzIhX0FmZAJ7KxN
IY0h1nAAZXP8GlTrXKD94+W8looHsdGb53uLL3h6c+nQlwmZG41SM1oRGebmUFxdbESK3n5EyOg/IYuT

yz2YmT+IH6XBu7bi1TE37CApS8kilGqtktIvqgE6EVHf8MCH6HgSPB8BDf/qbICmsnxq85C2V88nxAiv

FGpr0gjArrEEJM44Qw30Xq0sv8RMUd7urnAfOPiUqr
Ahuu1czRfpqkiqKpIVXs9NTy6kRyWN3d/mX57rL+O5SsIDkizABgdqOcZyxFiWPsm1CGcfsPX6BUqzih

iXxsqjHffiQi4Beux0YhqQnaAdCpG5MZWG7atkH5w2kmxA9fYB4Ts/hpLBuChjwBz2Kb9bYHAcjKb/er

uraZW+8fGWtFP8Jkuv1OQ3/mzKC9JS0n1ATMRtoSj2
YsBnDX97F/5NpZLiIe5D7xcu02d9FloTKrzKWWAPokhJmxdk9TOSiUR4Uqd58fHUvxmsKUw8c6RVFkrq

Zt61KZMDeTSJTVDzZXrmVbmxJATPh6NMIjY7P61IJT/E5zVj6XAIldlU6ZrfpyWxe774xXeTR5lAwQuI

lZdUdMGTzHNSajD2cin2SJHsdMRc14M0wNoS09bQft
8s1xCdyzJcnG6IMEtmGXsZMiUGRPdet6FFTX1Ra6vLlzuanXgghi/hHhUVUWzReBPa1GxY61xo2nyJpE

pmuGjjw6YmNH25Cpszzx+lZxducbSIJqj4YFmUDEaeVXw9rCmp6MKi85rDBNgDVradCNejLD95L3rAcO

zU1Fb/wFBs1EwrfwFtbSoM4CisT8JbSmsM9bt1ZW5Y
kE2LUKGSndPR+qz4VIzEBqneSOXD0H3B5uqAByyOsL7XLOICgQpHNwG2QMNwBSbvewiGGhoixBsVMIKU

PVrzJjy2gBiV54+yFxbCZt47L0o71G9PPMH7FGY464xKovaU8Tpy+DPZbSHAZccsR0BOGJ9iu1w7bZg6

l01LgQtNTQvFoZQP8TEHJCIW7wo9lR7cJTqG/I9HMb
brGppM8RyB9H+0zrHuQMaUco0r1YX40wtjSfn80vrNuBMWSscuhfnyNpAQcr5UCcjRk0R2dyi1l0Z2Sy

DHc4cGhfOZ+hNfL9J2QTmc3QubjpRyligFe96Dw/HI+v/DkFJVRkvsD2qYKdlimDrIwOp+fOuIDr+Qjf

7UXSY8XgaqDfwldBpNUwCK2Rz2pVoXo+u8MiWoKOfu
k9YIGKZUqY5PGTfxIEgG54osrglQoZvvuW+gIAs6ARNM0qbNX6EE4M+BW1w6saG/Y+wevc3Guo2ZGPh6

1xWZ1s5+Tx5+/bE7puCpaeKhG7f5QQcpMcYO3ulWVrtZASvl4ChgCpHBnXwf5Z462JvCovac92k4Mjym

Wqdj3GGXzOMdwrF/faBcOgJuoe0b56Vbfvy9/gZrfi
2goIaJVZG/N399B2orM0BdQzw++1h9XnX5Hj6miMXmJv53p8e5xNvXjBVv3gzHi3PHM1yRZ/4jlArodV

pfn9unrsJCjFfRdL2Bssshzy/kI4I5lPJ29yrmR4+hiobNNTF4Zr7/fswWbbWAtSJwWRSb5motID6Eev

Tramaine/xH9gSEWhQByz3wqNeroS4MZpdzDnfq1uXYBwsk
faO7t0i7In8ziRgTEuktBkc2vrLP4d7eaSh08wxezQQboIF3a+u5olJir82NTnUUdlNYVArf2WNkg2Jm

kbNhh7wNAuEPffiWzkL5uhePKwA/CW5Og6mdvh0SlopV7d29P3/vgK3BHO8X9RQPabmi/k0VzB+ldplt

oQ8xIdfqTo1ChGFzA5c7pED8MO+2vZjVXPnJ7S2RO6
ymvT6P1aHy7e1WNAlSS2x49Y9dJchhRgn8QvjLlNISK0biXXVTxOHuiSbu2Tn5Rb5q/ExvA84jKLS/5s

7+9LGIF8+gpVdra4/CAd5emtcBeGKno5d4V6FrLt9v6/apHWDdHx7S3ZuPnniDC8/xaqvQY86LToDYpu

kHqqYv8NH6Jfv/9NeIGQOCnV3dmxreJzlf6C9QQU4j
B/XwBXEhyWjDeV7KD03O9F4jwpNah5eNi7+EyGsu2wWuOSU29rs8BQ8nPiyepg3Wme59k3FOZV9uBRrh

T6Q2i6X/lKcNrVRXcoKtTGg4wLmf/P26+xeCCJ7a1gGuRP9GNN4E78feH992hpnA+9jguuO8rk4iHik2

ufZhw6LxV4oz1bWBMlEppMX5tVkiNQeI/prTzTwjbv
wR+UWMPVEr7ccicu5jKu1+NXK/XwQ4STkFd5/1G9iw96QncPI4AXmMostqL7NazIgPzB0I4cxWD4hTl5

/0jr8uwlpbBgQg+vPx3HOmxxZbxyKuzMF7Aw7mysgXIgZjDMcUVyFjU98df/l+iNfDLxXtFc/J+hnfwz

yBuqJwVW55ZySz9f25IQ6tnADcs5chDznVaMWLH9kH
UO/A+w+k3vFKs0G7EQqvgzbEvuhAzSevScV1TU7XX2R/Qx2nT5VoW/bclC/f/cQe7a6ycE0q3f903FJA

4oaCv84EoUhGpReEgtlzmm3b56sTP+aJ3m/t9i7ksP4zVzNwDav+0j7A3vsVRs64IKOuHc506gAD6+/m

bn9nAyJalQgseAaS2XBfcsvoVLO/ecV+cnE9sPE7xv
7R+s+48apR0DiLKCuw73OphTlDP/V7WJD32ITn9CxARh64loEaw64klQTT/w559LYFDdSB2uj1a/4jLc

LwNye9vGAk8HioyHXsZvbdkMg/wJu8eL3R7r8z1BXBsCx/8XFEOFixo4pcGW98hPzydcY9VEYe/r3zXN

70TdYlsLKgcbGfwQXH4EiXBe+4Li7Fau2S4R1V/nvv
zT5/MKV58rjRor5eBnBLLublU1vpgFSPk3EsohJtGX0jZ3XUeBMS+nd/huMVCGjBKnrZQqg38nR9mM2e

iAK3QCOkMs0cFcKvb08kZRawNrdfqG4Gy1ViTnS3XLmDi9aAG6guw16Eod9SRdWV+X63dRT8W4nteu2h

7L9fDI8SVhj15MzN1teWL4kfOvC6f6WpiW8HlCkWis
pO8VidBNlc5BChhUUkrZ2VO7VbyJCIqRoKeINAm02kAV19Ge7YRh3/9yFwPJTSWfU0S++jZNFgwLnW3v

9lsy7ig7b9XN5rYaQK5qynM4sbNLXI4JvOFWFNIN/aoyP+1hdI1G5UaW2xM3RbNR1W6saeF14Z7UhOKO

cP9DJ6Qys8Cx531vCeG5zoP6wkc9ByngSnMNuo0IqC
P1yo30whC4xX9HnKkn1F7TBAljY0BnpKvV4uzS5VpOM/vDgct19dLO8a1B33dTcpD9IIb0C0MSM3JfDV

2nkHpchnZqIYh+z2++CVyjT7bTXIiwJw1klBfJbJPZIIBq0hquHxHpR0NOPZnF45+CzyZB2ADSl55KNw

CMCPAi8LcrK/wPPZl3miIPGuVPBjllL2wQQGTpsOuG
89RCP/LAQbB+SvKn3De1jVHbLEBe9kggfGq6yk2GgtDIvHVZoJh40IgcjwCyLD7956sPsHz/xfaJUMb1

t8yTclRHZSD1Is4T2vfrfFA8saG1lgnHhbwpWcIEwRihBEHtD1g62uYAccCbmspi/QM/04upBci3ecKr

uKrJ3iAoV+H0osNZDnQkz58Y6XrYpoj3dev8jg+LAp
So1aCTEzwyS18PJQqoKW5xjsDW1Qu4z+q+09pwwWDcvTMD+0lBjTG+S8zvtbnt0ws2DMDkEEDQ8GeZKS

Rs+l2jLKGnacYuaCm2MaMRGvZTf6a5XhfXiGOS9alR4k5lq/NRm42X2OqshPkOD1Lz00kTqgjEPQ/uZ+

OvQU/vEpQ44af3SQYwSqTA//6rmwdAX/Z8IorRqd46
3kSOJioO0AaRVo3sV7VMfhwdY1+WfSwbqpcS4WFY3iOu1ZYs3KzltXcaERCS8JjXF9KhIQo6IIkp/dWP

LxzKhsN4GkOQSXCFS87qN8GFvUSb0bm/K1k8iYWbgmU7U8k5oxq/4pn1tnnZa7+odWzdbC1e7O4+Gd4b

GTIrYm4K9McqonQLyooLfMl9gLB13ZFrrdZUA3Ma34
fEr3H44BevagG4bHE+UqW3qs4DUeezaC8sl94/Qt1sHCYpwodXWOQlh/FneGAnc5FQKzbfF/3MiSpeFF

h4S0kmli/eEdQqD8SVIqvJFs7yqCl848yRLTwRTv3slZhudeNcW4LGw0EfDf8fm8EY+o5bUufVaSGAqi

IJaX9V8O661H6o1Ni34WGULvaFgIts5F5DKYonNf1w
vGLwY7HWnjiasuswTU0DtDvShOwVWqRh72VoWxvX0UNUsx9Zncql8OLXhaaWkWswhEkLh/O9aALRtjff

bYIpraN3b45WYKb4s4E6b8/zHyuLBadSuTr2OdohkF+lGvLrXSg9zcD11Dqm/G93Nt5u8q7nTtk69DnL

EpkBlCO0B4WpUcGpVWSc4bxJm2ZwpMDcOZjrM9kHjB
10LFznVJe/bChB0Mk1n1EQhgrNtYgAODWAw/iVVNesZumSPN4MWbz70SJUHXANhi/NZ7fWSFOO1O2Aj/

Jtf2lo666hrXbRnWexWk4jBtlfk5w5Y5Io25OuZvncFai423+zwb2LZUSHl3MVSpV4BuwX//a37DWYKK

K1QO6u202S5cD/04JsmyCMR66TJ7uOhmAA0JBCavWK
tsYAQ6MVNGbdj1+C/h040w1ww3VRnI0WX5ufSAe2NFY8yWsRnAyfMaPlzqApoCfWdclg4usPKPz8vCCJ

G1t9zFYlV934ZGCOa0mxyAK6ojlAYet6PHZByMT01T/1t7q5R4CrP1FIRUKOa9g+JqNiGxhHrfFEsY9v

HmtocZffQujFRdoh0wo+8XpxJop/UD+aGJUd8JVnPM
iGkMCwwqiH9UaKFzRBX1USwkgM/0idCecY0gMMJLeL4GHPV4/c0nszY0++keHz/7l3TFAz+9f0w6e0nT

PdQrDSYpUUMMzeirme8d4X73HvKv0UbkJnneW4BU20XC6mOGXw2zYGi2Gc16t128Zeo8f7/42YpdOJcM

/EJ3I+jSkBIt3sySaNjjkD0hMY84SVgOEhiivw82CC
3Ey3B8mdXlyA6aK8VbEX5kcBKNlfwHVLls/XMwcUoXykSYOWl2cz8GF0XYgmEiOT/1Rei6msMZwV1IBx

JIEijHy2goioqWc1vUnxTp4Xki7M0MdXkXOsO3UbAhmKf3YFAkjRywqHk4P+S4noOTYM5/evDClbe1E1

Z2Jp1PM5FnMjMonDHul/3RohsstoU3uqUU1yU9aXhq
wr07tRBxCg9r10Aag8yxmxDfhAnObkEdEmZZXZ4F5oXe77ZeEXPA/pWPh4HY9mk8yN7mbveLrlnfyrtx

qM8bXsaKccD7SzgLM1xnPnTGVaYxm6dEQTYGZUgrn7x2HrQmM9//XjhGQHGQ/ykgUyjAQ1XxCHeM5KBJ

HnNVH6LNIQn1adidrqmaXKstcZxr90zSaX+t078mfC
b8gW5h1UP8bq/fRf8Y2SDRFNYfioDJ8fagsx1TV8TvFoRpfy/ZchG5ZhLFp3kIQIwJ5yNB9e3Vv248ym

4pAmgZmn//KuGFLBD4PwMuZviA0b/xPUjz6/HSDcUkOTjyrEjqXVfO8YOyqEt8LxUuebqSmdqs/VV1br

Z7UrSPHXLmNKi653lk1WR2Rh67w805NVi+88145qKH
aBkwBhPhadOHzLDrY8UNPFQigm6136xeIYcW3fDwXPv7PeRqMOzJseai25WcWQv6QwjlKghQIrhg/PUr

68kI1va2dEofE21t686uAE6skhF9B0PRSQ8J6b7Lo6HR0lsCuAZz+nnoK4TNE7/upBxlNdfpDRMLA/OR

ZLAD3MQ67MiWFc7vXZPpe6vAcG8RqtG9M6sG+1XwYV
Eib04sd6omCa4O6MuOtRktx9pjWaVoICiWP4lxj+fT/FtzSN2XgHJXYBSoMIA+1SnVf3z8d6iRNddmmj

lSDvoDTZuvFIHGHX1efRJG1k6i6u877MlcLaQ+l+3HAZ4Wr7ne1hG9WxBcKQchZA/ZHEZ81s7ViT/1VH

xyDdGMCvltPziIXoQ+vbgcxO9jOtgBpHg2psvmmh6N
qF8yHirLnPFu9L3nohSSFeSuNInVvpC9Lm/x//yqLs2NpU6XeCIiJUfWup9BNFK+hVdKub1OLCJsLxLz

3y9Gzx+swp8w9J4PGwb5m+RwngksB7xAe145TAQ+W9EQKGVrPWy74pQPuoFSEth7Vg6MNjvNzoj9AUkC

JUFCwWHfwHCZW2nIfW8RQXaAZrCc6PyBkPjbP4klCl
JP7Q1s8NM53jC3M1cxI5HgrQ4b35U1jaTAKj+DqVded8ucCUiu2+BpTnjjGZmP3JF5skbiX/W63GcXwV

2ebLvRh2pzu43bweMOtDBtJKz5mGAiEqQvOBV8QAspCNaSL96Z/JGUWs9k78hEyFaaqcmktgx083cMEH

4RiFPYhrsq9lmju5Fw4QYzdENKFo9/NaSZecTdBRph
7COuvBgt4ClgwBsXN9x6xiOjt+hTssnkitf1eg+gbiO+pEikptF+SX4FRpYqP3RM3Tvt++Hec3/UhZ+h

BjW0sLdFikN1H9+4RHRISsdaL1gZGodpS0q/UbmvThmNUYO/cbvWt/bbU+ftemtbbrsgAbhp77HckOoT

LqRbyCJwosScF2dLTuV4C+eskwJRyY9zgh97wvCMxg
tXOQli7uSjbGT2iSJ3NwU9uk1O/4CIr7aVB/vsTgvyr6yChDkstogm0q0uagRTz9ryxdVtRB3M8bEwJ2

T5PmVHVL1B5cllg6fCNXK8bfjVNkbGHVvZ94TIdwdgBVyGvbQPnbqxHY+wQb4O/SGfsST4fX+tW7I0Rj

WAYNT05t/Yb7Lq1un/WqrrlUCpT/34kc/o1KeVKZGe
45As0wnk9uh4cpEU+A6K5VvFOqOX4I3qelHZpg763zgpU5ohRBkWANorF1JiAS0V+tzHeTzD0B0PFqjA

gNprR05oci/HZzg3B6TJLudFl1XG8Ga8nvSrOaALElelPpyd8dvj9s8D+0Tz+Go98jSsXrZVCNTaMbLi

C/g8GNjBZFuy/8+vo7tFs3ncpIBQsV9zpzDQINirAh
kzlxC0SxIZeA8dvlEFs0Bl5E+VSxl+wgsUIqbsf/KnJh/HLGconOglKi7RA5JA4Md2+NCPJHjWpeyfAQ

w1eAgHzdqWmZG5UdVbcVwvI6hruOFwkmSaf15cuQL1F45AKMmMfgf0YuM/VUW/AAlK7ynJO0YSuODS4m

eMcMGPqoZexjTpb8YkYNJXXCgIIezRTgdVwjjfXBqD
VN42nASRJz2W7NXoSRIMnkD1Q5q5E8GdWZkj8UbqgJ3qlsoyHKlVANINp+d+XEqPea8QbiR9OiZumAZo

zUDDc1juXh+F2/95NK6+7b9GaXnwIiggJN/BO1T29tf0vdihWoGaOifxUeEbT7vSNApnHhMpxqERSC9H

MUCCDE7jKxZy4CpqKqNfPmQZ0CY8QVJEBEAYwfUK9I
3qXPcU61ltWIyC0nNob1UNkckfwzLoJDqHPA4ShRfogyQt+HWZOqf/oQECF46Tpigq83Q7b2GEMEAUHp

5gzS2U7IFYQJKh41Ilxi/arrdDnL/YkrVm7tSxk1ZJSy73W19QtTm09QYCCB+f0IDnE/WH1NthzAj+oW

K6DVu6mVRgFd/y29g9WnK7QrWNPYTNnYQt+tx3+Ako
eWAvQ5FxNCx83WYz15VMp+VE/FukHy/zdriO6RVUVce4Thu80RKTxWV4k4F8Szcw2toXIpD8xxGQ8Iw7

9mQzWN3VJ5yiDs6wz15yza4PYapjGaDrxOSSmsHPwKTvfd0Nl9tionH8FhX74CCobQ49y9VTZ2BbcXWY

0X+Ok4FeDA71J8ebYBEVFYWUUDnjnRFpczHcDSumS6
s1EHhkyixszsC3cduav3Sql6eOSagiIRx8cizkyvhj7qTDD14WuriHIcDIBXcrKxPRoYyo/K/NHsYgpe

fWnuv2iA1pya8hNk+r9jXrqPaQAqJOTs2K9XY14IkoOAQRrwtyPDr3EEbSo3h6Uee4XhcLAsLXxFzGu2

7g5PIvVqgOdZMBILs5MnRYHtzsM6WrzYS8t+TqD88z
cRk2jfRQW0jIAud5Pu72nVVp3QRf0W3RlC1jFDwudr/wG7Erw6EUsMEMVMwPGsUuZ7YS5hAeHQgy+DoO

Jbmg1VZB3EhEBz8pyuXsEvGmf88kdBpLrnf39b1nnAVVn8CVgjukGoXwXBwq74OmSA212r6su9wBT7XC

/wPZF3aA/Q5mrCeJqTp39MmIj0D0Z6Qspoc+PzAAjv
9bbU1MGTHr/lbhPA62z9PVyEH6GfzF/+DFq5vyyt1hZkclpb3IbCMhKUfp3/Yf2P//iP//iP//iP//iP

//iP//iP//iP//iP//i/jDxosoZx6TTQp1NaOY/6ASI4LH1yO2Prm9D9lIgeiRmyiyqwIFM4taYMZoET

Q1TQ1thgtLMZl3zJouuFDC+Ilm4zhqb0IUw3TQo5l1
kWWRBC5uJv3sFqLbJchnK9/bHKoBGxxliFhFZxQmoa61Kngkv9w2yQrHbY3kDy74L/2DEe2nvcUdZ5jK

DJnVvfXaaUAe3h4BQ5qkf8BCXieeZ5GbsOseTDLLshR9NC6og3GS+zGOxqODmJ3wFBIQj1dqE7Kpt0+G

FPlxGpbVLoImDqGjmYHX0tnSMvWfObLbuoElJwlFiD
1uP+FLAIxmlsaDbogIrqSQX31hwzMaYctSOqbvmU8PJwSfrXu2+uvwHwIof9mMoIZBvsjLeSya6KfxPX

x70wxWjYlOyogunJhreH3u73TWjQXK/sTxxRqVW8zlfth/KW+kZa4PBiissec2mWgAd5enez4SNlj92G

mhhfgqkAog7/xy//6J9kP/PZjgfvwm0nzc+is58crK
eO3jR1XF46MEA0eu+zHWYOgCup0NRY7UjrZYtsWHJee9slotO7btAloeCNu1eFuUXU2Eih9lit1LqgoE

lH0OE2Ufr8XtaRT01yu4y5ipAgCOjQK7K+ELBoX3IVFpwfSSHlehj7IBsh39EiKydjtbCcnM2wTpHbe1

71FE7JtsVvTxa5+rKq8N7pRYVk2v5G10zIGq0XqNCk
0t1v1eq0lkP45XjzmbtOLbKgiBAk89OzU6wecPOTP5FZCzCGy6GVlbehaHoIYogHTYgCm0x2FbNxg8N7

KD/MMI2mhf3+vTfadYEFGzklBk+9J5CLt+g64aJASWUCx9FlL0BChehxqqSqO52kZuk92k6TewGqvA85

CF93LNyeSJqGl/MO/rgl+XzCYG0G41QMAGAhmFtiJA
9HsdfyCNuiX4d3NsZGiTAE2leg4KO4YA/wEBYG1J0fstfhegsDrwiOw2HGb/kIwTR07a1rZmwtmKatxp

FgHTN6bYlhRDWuyPFalK4IBPbuQv7F+tv9XlY689jCjh1YBgy6aDi49lvNBcXdlFf/Nw0dNs9e0ngbXS

arvPUyWsQlDwuozARYyalDfeH9XTASuFDLK0lbOiej
uK2YTlxU9su0qp0DNmMOdALY1zo51/EGEK5qDqWZ/jA0d8ub4zoGvKP9pG+/b+cPHRnE2Jh1qnY7wc7p

eBOb+nY0iuSVhSyTvdztVzkPn0nFdsPUjTyKE6qk2lZc4/dJ0tpirESE1f656vq2E+g8DttdITmxmel7

a63i0Pl0l+AU5WbhGPUHVh5bLupBPns6Mt0voR+W/S
ePCN6HVdY5CiwvhURzY0ibZIAv/shoGTfjAfCi++xOXQmKqUbgZ/7Id5I/OtbT8JM+QVo9NjzUWhppU1

TzYszsdCdRkJfCzFh/YiZ4NGc0kxpE0G98J+UPRNYs04g+K+89Fx58hQcMpEzzZ3RTTpnNbExEa92WqQ

FjWTYJTEvyI3RbSbYrlRzLT5lQHOND3fhqkcm1uEZa
gk0uVDjCFbm+35s4qc7DEjNeUqja6WkIJQHGc6o/DL3aENbmoNvIDHQmA9pYC4BHh0/YVOYkJQhbMoKh

MF/nI/7AjbsmYdkw02YBVKzcHYrfDeE6IZ/h56xK1edJF292u8RVdtnFTjDap9PHGNokbx6eWCVmVkOi

jb7zNr1n232URwlv3wp69Kb78pXqohoU416Uf15y+0
BVBnUI7nvWU5/aY90Y73rCVEqAXUAAJiPC+vFmDTlHWP9L6OPkn4h9LA+Q0xfLDjSeAgfe46ECA9FpQz

s7TSCUI0yMlb0TZ6gpIZB0zV2taiKhPztI9DNiGVILtB7RQOMaGzqgljSIRuVxjlWeY1nU8Y3Hdg0Je0

+3YNVScJuAnwpbt2AhdbOVZ4Rm0lZprE6Cac17eMzS
qq2boaiK6O559Vkm380RTPnO0+F86AmFRcLiorqsZ/mmhQYZ8RjrWvEdal5xEceZXsqnEBUGmzD5U7lb

OtGIWkxiuroQnDBQRPj7ngL1Mz0hF2VrJR94e8C1GjTGEvg5/sJJqPpGjpST08lUwKwQ4d0k6dkp38ds

0LLNNYQz0/fW0z+x3ykyACkzM1cWQwEY8pQPT9YKdb
2gFyoZ1U3+02iV/uVwTnpHg8abG1vWoI6O4dFISUL5PilUbxdYqCV8AB+6q6O0TJeh+2YR6c+gJT1djU

N4DyFqN1rsN0vLtv3WIacPYn42EyYN2ShlQ5XWe42sYOFqeNwh6Q9afrmPXrSGGLlyaCTscO/lgkFMyE

h6J71r4hGmttsl5j9bcDnsAH07RsmgsBSKKz1cu9Zt
Zt5OEpO2g+gdvVxABB8n+ylAxrW7BSbYT+DcWrWqvncmN602PSNqA+fE8GKbe1+sTctydOcmSraRUYWk

ZUBD3AIcGIAjUBBnkFXL4ERZ+eBeghbtRSybTMV3prgFPtI7dZjOzJG87Fe3HxzeJwdI1EFMriS2Lx6d

En98q9rnMdNUA7sOP9ETEBVB2kX/qPWdKYL4Ddvhu/
g/uHx47MHr6lV2Gg5QdN83elYHaln12EkFBpSzVOvUhsrwIG+PI0VZ/xqlMGDRhQyB1MG92K+MsPpoDm

fCiDsU9BSwfyMMVvIhsJi6mshZLmnOFBI9FB217vSExYyflpeBpkm6cenlFG/agtYV2I+8jwc7s5nOY9

jzY6CtLsJeneD1LPnUBimshxM6ibNmBOAZyVxS126f
CUuwTbu946TyE8i7PbwU0nZ7PND7CG7Z/B/tW+c/GfnJpuqOc6FEtWLKlGrn9m2BSx8Th8dF4syjUtHv

5g7fmr7oDeexGuJ3iLCnBrZJuMT0ENMdHtc/8H5/3/f5cH0+5/bUYdf0aqiRPhD3cPO8vw+mv7fcWOuE

pUqKCaLrm/JfY4KOnnJYXT0svPi1tneq7n1fpB7k7e
lrB7/X93sc84IrD5JITJECnz9xVYNw2vGoBQQJ4kzL6Fy8OOaQ7WNXKF3wINMZXxQR3dlKm3KHhxxGWy

m75Zgl4mK1ZBYvhhY2ItuK5QJflwcBwc+FXaA8nWK0s1JmOo+NB7yhle5hqiyb4QODJ8fZKPgWwsA0tR

Bai8rDI9sUc+UkN7DIjwblAeoD6RdihYBstl9bQuX9
8nr7IDoEc8ZESGCywHqm3pl6Fu8kgSy+Tj1+hqpQvUHFIhdpv4JRfv4sWng6v3PcTcATKp7axB4EgcCo

CLm0HYCsdBwJxIKQMDP+q05G2E21d+eGudmQqeE5yLO8ohR97GOP8gTgooXr5BVdjdB7rrHDOFxCoTRP

d69QobIKi5aWsTrM6Ch0ojZvnYtcjNm/zPbejAzwtb
kKntq0p3MFR8t06B68WV9lWTkomazqFykxL58ttmEUXJjxQXbKvk4Lowrx9gwu6zglY2x8AorgHTvr5k

xHD8JgMW8+Ui59IlmB6TlL1JiPoAVxGbMRB68w485vH5TvKMwoFKwW5heWZHMoXu8LSvp9aTxIyhhUfN

lI3+Si9XeQyWtAU2GyZGYtRZ/hBW4D9VyWVZ+W7waS
zGCKQaiOjimyuC0FkJHgnmj2PdT/2yt4sFj6pcUNuQMfBVELe3m1UJdALfiVDwKXdNDY8kRB9h+fgVp9

//Al5V9/YNXH2C5BcgHYcUq2Nd/QEnqkxT6AXfym+cSzU7qzc2PJHdYJ/ay81LPIBzF5WmATK7twZozm

SnAMvs3uihzugPi65Ou8Y/5iS0WPisSB67v4N2F9S/
3xFeQ3cRgNHiq+RfWj8N6ydD339yKyJWB/rUX0hmKeBGAzIu/1pTTKcVyjdpKnO3quH1yIUP+S4IKu/d

C01vi4Hr+4SmJol+MleZoehTEm7H1awXVoYCJNEuQLZ9WSVJ22Qwr4awRQX0atbVdgLc54CmnJTpyWyI

aOVnZ9/BC0kmGpcSNunIIC8dFHnTTsc7U5Y1Ws6iK7
AtUImfni9iFqoE6J+j+XB4tNqY96H8NjtjraLmjwFXZxsaWR67muCZYg0NLCsW3cbEKFoBUUC6j5zRgJ

z0apE69Z6sZmx7xgGOXi3ZCoZNiQdnJg6mrs+ZnVoUyCKlnZvFM8tk7McJnfOtll0xlQiYl25H30GRx2

t0OgqZs6DlaZ/r8m0d6got6BIx2nHNSW9PiW5DjkeS
pVpPsHv/28dAh8XvFzJr0VudXO3FzuEd+NOQra70pO3dldEg4Wus2eenGxawdSHcBnBy01iCQnrCN5nf

b5lgD9PDj1afMGNY1uoBd3na+f6rypt6c1vTpfkyNC++b8hdGwJHnpU32fwTAvjRXz4sUAZnskS18i

35C2ngItC9Xy/vRU0f0Rlw+m18S5IGuNgDeKuA9EHB
ea7RQ0JFId62B4f1pZpo8W2bUT+8dEWdzE2F3CLEarwZD0ABhc08KQmxutQhUX/Ko/D1wtcNwggUtvRF

sLC5NNX17bMOfQB5tOJShQ27kXC4w3XcVQikeuot4RW585l7Y/4uXzwmjaDQJakOUOrnxTUjWe5y0sqx

VCK4cGIQyTDNB28nZ81sqO/vpRvdc3Ds7Nfw+fMOz/
IvRM6EGNXaO5DulkW2zZxeNounPK91WgNd5EVsgipivx4iFq/K+bMzlDgICN7rBEXdIqP5OIXN+OFf/Z

YCCP5y2CSBkEsgDN4Ob3PDPUkD2RkuZHolF6eLbmioEHh1aWgzuBTrXPj42FzVR/mkuU7n3iN9ON+t6t

ySMQqEF7HCazNe3NIMxvKv9oCsV607hSgq+TNTyTND
VQmVoy6fKmHTGDaK5mKqYfqk3O+aJMZQgDhcsSbnMCdlKM+J4o8DdgYQwghPRF0pafbkSaQZJKFRcjgp

illfMr11AQhAXDwhuUfcrMlvk+EeA1kS8uN3OgEXYpndWX18rsl4Wzc9tevYN+L8hqN3l0WpUq8DPzzl

albUSK1BbColmv/MXSHcf2weEeHT0UWmRG2npxyYsg
1dO+lvb2klZTrCxEnCijgBkBbnMVIwi4aaJ8MrcIIIV3LNO9jyVPts5Q/0fGSE1e43RhQwQ5BCFMlaVQ

7Dh1X8RLS+tydtoAwBSsjL13e1Z15aWxg7lxXphSClJPBE4tMlcRJ8BrC5EtefcVNnGAttAHj2aHbyk2

QLQJI/fcJlvC2q2giOEM8FZ3FwcTbpsEbVS92AR+Om
igezKsXcciG7C5lwtboWwM+1K4ISYaaXa+8z1G2CaoYko8rQNUhmTjZ2guj/Yy2s8QWU1/eaqPHn+PCA

G+1d3U/x1UVOK8pTq1HReb9CAY2+JnDU2Wzb2OjR3Irj1p2+CP+uze4a76xMCfOX97f7/HfjeBRcNeQ6

wi50ObUnYiqvc2//Vf7ozEkBR/ssZSy2WsaQzkp0mk
DsppyF3zfd27ul0/B3jUANG5VEEhOTvSkv6HN1Qp1K0fLEKk4aTr4dfMknrflPgjqwXopsLHkzklNl04

ONad8g4YL1SFA8HqAhyIJkXESgbF804SnVlUTGj1JFTIosFoCVVso5bMda5uM9E0zDjQfafgk+VEsu0i

5wZIvGK4OsG2OG9RvUXSHjKt+OAIAZhO66ZEDx42Dj
tBuWho71zFW0gYtou87NbIPosfM/hTREtMPqyUdHa/8ZnieOOKy3z88FV0QbII+LqDyZ9acUPF2439Nl

hEFDuOWFa409QmAA89+7lrDPdoh8MqdQcdCI3OtRWv7uT/RVQTWxm7sg0q16RqoXHVu6lH1XT864M8hI

6Jv1AjN5H/R+ZHGOf/H85wzAxQc4QLu+JEJfUMHyzD
MrSaeNxkqZTi9Dv3DBMTGVa87akThN7L7flrSv6qaqq9oRlwlrxrv4X4bmgQ8BySeTvHwQhSlvqrfmWD

u6xPnraxzJbEdfY8qvIj7l9d3Pb2hA/STIpJ45OhMGiEwxAU9+P9Pd0zEyVC/hkE4eErcOkRSM0yYp+B

9fJv5uGBHfG9BnhbMWAsDbJGbC8v/QLDtLZ0KC2qy5
Ul0Rw5UugyLns8vhB9mv80Al/Xm7w5t69MuyzcWBM65OX5ihHJ87apLuavSvCvU71lYJ2cGzi0fwLJs+

ehaiuZdjrsdGi2Sl9NKVGtpq9uZ/tqaah2Qhn1+OL5yWLxgg0LYeFp1IcHNHYVU/eGfwrJdbD0CQPtAz

NhSRTWInQxmYoujsJ1Ww29kQeS0LG9NvuZTWkHtKbO
NLS+lOMd6T3kQpTXDJ9bAhXjEw2cf/Zsq8rZ2qo3wFb21px5enwrDtLwFo0jrMSbus18dztChWnpU5Lk

/Rsk0HdH/1+JJItQCsofbgGKAAuZ4R6srvRS2i/bRX9cYKDWpTMY1S+9TbqGYFDX46VfK1Di/bmiEMbm

j0a+KifWphGldkHQg6QDR6QvFbjvC3O+A20hgA1X4s
8BwZwJXSG1lKm2DhbFY8iYLCQzg+V/M2VXif1f+AVRzgs+mLP9E0Hr8c5YKoHQlhrXr/EVBw+65K6zJA

w4ADaBxWNrVyiD/VbnK0dWDBddLSRNqTfvKJB4MZXoTkccn7ixNp0FDzRc2x1c2YEqABEjWD7IXe2s7S

D+p+NxGg9k+Pb3VyjkOKSewlvxIPN44ukfqPBemE5l
2tNDB7beHvWeofpWZWJOGEgyzu9aURuzbOoXQhVSabZdCuNIseLFWSOmUymtdL1SEPVREa/J6yganz4q

FlZiOLtV5AWvdEHxjOKp7mKWgdpe5pvYY7ZlnzJZnHQEH/DqiphbNuOuL0FpiYyzII3tDXJ7UdpBpe86

qxCLLvzKlq0xq9LfyWDs994wqlpvqTwXm6Al8LfT2/
3Yc8kg3pemxbB4Xvv4hCg1bsTrupBZsXKJv51ohf/qQhjlK3xyoSoQ9knVrpiD6mby19k8kuC/PKtrpy

Qi35mitaMw+PZWgOkiesMPtiwCItgGaU7oqtOrf7jDwL6i4YIiQkbe8cqHi9jQ49phYQjLybRoMIzLFF

KIU4M/Q/BqMQYdcAKv5xX84za55FvLwb1Mzj0WoZSY
AWvpQGYe/iqKm/vcUq0fk7PSDqH0Pee05f/xwan1ElAMXWNbJ6hqFzhUcRh2v4VYOxC/rsBRlSiyZ8yK

oRvvSOuV5dCBgbFrMVpb9PxI6Uxhh6cWVK1agS4DJqaIVP85emOaq36ei0EpFy0+5RO7H4DZc7O+Ji1u

iOtOe72nCFHnm9eyezoJNdAkkrjymK6l8h7pA6Ea8p
UeFXKCZgbCflyx/Mandie/2Cm5q51ew2/kHIyuQbj14JiVoEB/OLrktf+PFFR1DQzXa0lKxgt7hvOrI8LqJ

GB6WRhz+gUgrbYPNYZ6L49znsHdkWLjl9NYoGzpIobtXDuvXUHQn+j4BbPu7z6Mb+sq3rFlkDT90d8GK

KKA8Du8iyrqfnlpz5bptkg2Lxiak+P7DyvqCEk/m9t
Rhux4r9pIPN4HnGMpoOxRKzfwjm3+ErMSzF8FS4aSk8aEPLRE98d6s8hVUVSMROPocAw/Z5C1Ys8pBoZ

4KFp03SxAExoYgtqRVAdkZKUlpMB6/BhJUJxWnIiYCdoqNCdmSmuaTayPsHnNLnfNMSIFaTwDUt0Jlle

CDeL9OJDnO58XRc3yqxv8gLgBM30uex4tHSMSk/xnr
1kcV3ASHp4f5FOrEdcgdB7ln5pCT9MPSxNPqew8aKMu6UIZW5k5J0ww89HEMgP1o5aglyWGDGq0seS3v

coMcstjMsSFh+HOh3aIfXOXJkqaOfp9rcMG1Ja5xM6h53yz2zzCLZD6Yn94BXxU9uV/hdumotJ4dwRgJ

jGClL5mzQE2nwFkbVssuS40pil6OD5TcfEMuRWeASy
5J4hkNn9YikJTTWqh8Z/8X7Cisq0Siy2OmTw3yAjcDxQgzCxr4tuO37NAbkY667CRo2/HCWW9WDqTxpf

bY73NQFqwaxQK9X2WCQcql0t4xNEbjNTxj+2Hn8S+35oZW1vb4USmCuLtri1JS7i8aP5HTnwWHvR+ddY

RhO4khsGTD9kWA93mMqLZo4Gm1M7g7JrNEbqCgeDxg
jSUtN/2CfxE9JjWrTmV6p747IM0ddWZkQOzlvlXMpUxc9lsgbz7LnXedeWpm8JhWR9J77+5ovdL4Pl06

kbLE/Hs0JgSE8+GkXoPKUfkuovGTi8cEuZ8WIMDphr5EvCapSrqM/UbL1lpSdHFOC4hNuYR5xsWrsvZe

GK9uWe+GBMEGiMVcHJMmPeuV4onJMzR8LK1o+6JlVr
ygIiSM37vZ4Fcq4/utwQOmZUJPLAAB7rOyb+BzjUKwJvhVqmGiF8D1gRx68pPh9WTxZEazD91lR7c/CO

RixJy0bkY6y5P/0TBP2mZ275eT0O6C5/S16u3D6ffh1SvaGqDJlL+CNDnXOQpUqdBbbxFV9aL8+E+IyJ

Twli8bz9ns38QptT7/+zcZxQDpI1O0gA2yUGeG33Sh
nBP8czS+iaPyjLDJrc17nuz2wQZHOVH+FtocwBISccDDh4yb3Z71edpJhNnhroZdc3smAOysQZ7rMzRC

Sw++GGpHSHkbceT9M82Y2RPlJaM9QiIxKXzc/1qMUMCeCYx7S4DyIdlcxJ6YyYKrsmUt/GzbNsdqFhkl

avV7tmxsFT45B6JnSFedsJbncARIB6dEuDYLQd5k3X
m54k37f2/xkx9jvS815+2zUF+njODN7K+j8//JdGz0klgQvE41k4/K+onP+mZAwQyV3bovKdpp9C+i9R

qqEMTMDNBiPnF/YxL0pDchG1VM9WRRC2y1dH5on2RfQ6DeS2UAvh9gWsFgD1z1YkTq0PKM649iX/kSXi

x2yGmVHT3XmDlGoEQ4IYAH6ayNk0gNfNl0+z9K63Be
gZc5RB3UjnExgaebELnxrtcqgZ+PBRYluTmYg7H7Z/nxIrOObZYVYoIkphjeMrCveZ/w3MaS7Cboq8OV

ti31EeH/8V/8PwTVxc//TEeV2OKAWeRhFHM5wkMkwP4erjPwXbvBMZGoWGHqDarFAGtbQLpnNDAxCYUf

WZ8PYYOhTYSegFCgWREjRGXsQeG4SLTfX8Yjo921up
ZateL4NJ8QP3DoIBH2PEu5Z1yqEfNeYPGdZZQIEq8+Og8LNXUaipYdFgLlTSLfF57nnAOslAFiEzN4QJ

AgUl0+Ug2uwdTgAvlWYAPcYBWhIskIJXqtRDBcS7QjWNFbXa5lcDAxSE4AJkZAXkWrAAOtFHB6XJYvDO

TnDBBnOw8PnDduEILhUgPGKhJrQDT5nxLneD2cfeRs
WvhZOTCwRQUuRmmVTRypZYAgV4PbYHZ6ZSM3Zy1MNW8cNQ4QyAn8X9H0WhK7yZIrI6cwOFrfZ0B5tUXx

C0fOZirzO7RdEbrvjHHxFHfyPpjgiRNZARPeLNAkQ5IWZPQfN84gES2aL76rl2UAsEPaKKWrJXZ9cIQk

SvzXQ3irPWIvGGM3HwWoJKXkY9w4GOQ0SKOoTcj8b0
ZzftXmoMYidsHboDL0Wh7twBNaVJ4LFnyj8NpADHrFuhhkTLiz1X9ENaOfetFVUuHf6nvUHiL0frdsEu

OYVrwRMqA74q1xFL4mbDR4Z1lyyyhyT4xlQocI4SQF0Mp00nx1/pl7xJ0vw375o3++er+q+uvvm+838w

HlPX1PKoUXAftBGAYm9tMEEwNCByPR1sMPk4Pk4oJg
MdIxMNxj4+c3c5AEnfBJBm4J+5PKALWAeUPSRiFGmq8EYYz1CbNr2MWEAViaaCiqqZ9g1Hk0Z601CCXG

ZMDO6aRjSxfbViyWUfjAWV+SIORWfPm71xU1UufEFEeptCwhDu5+wW0B+F1TNTsQiLzAkBkHIPq3j/1v

9aAeHBPE4qam4wIcdWTegCMMlH9LkIU8KBTCwB2UJN
N+8GaKv55WFLDSf05NmtI2POMILObQJtsixgHDpdi5Cs5tAU6FMpGvk5wKp3exkVvt7R53xBTmVvDjDb

SBgRXGxLqmBlHV99/BaGwwAMeoBhz9NG6FK0+jd1ja7xxn2a2uAalwAo+YnJqe+E54w3a0/eEhe1s7Tq

z5oWpevoNAkH6lz63WNAbH/3r9d+0ivbXrDhYWJhbw
H9jb3sv8TlXDJ/u+KL2ZjwhdfDq0o3ObUVxWOrsSuDpmlkmOuafgN/vIlPJg4FqeDQhkIp+YYcH/U5b9

N8P+m2rcBYYOEWqno5fIsFr0arWVI4R/C3/hL/yFv/AX/sJf+A+u0SeikFQfmznTige2fZGdn1tZHJVp

qoNFCARNffk5VkmR9k4x9udNIKOFZdgGE0FujgIyy3
GQ4PeGkIZXminkjGIT+R+UOqLgd57laJWX89J8qGf0guDdf5npHtHImgqCMUcrHYLCrRg/PjUWIT2Bar

3kDaCLbdquxLvbu/cnzOkX5XP6BrmZWllYYYgJw0WlLNPji9VA3bVkfAbt5JJjGfiqVfnsm9VLXzPA5x

La4Z90c1R339owK9XC3mSrTQg3S/T1vKs9a/hLxVgd
9zPCEH4txLwA/zuYgjXne229/mrpspH0NUip/m3rtOkr+6pYlXqFZr8qP9jFhmS+aPrsPoxPFE+sK0Ab

9G5kcMqkY2qX2KgRMdlxuOrBF3/L7LfjNq4GZyQpbeAC0/AEWEyG2rnoWeUmaNCmeIGOHRkbO4eGUvWi

THU26oG9wG0KViX/RR1wveLHXXNE4zWTh2EfTYYjJI
i2MHa05jGUPEOWZ+MeWIpvTgIyhcJQSGlZ3MdGH4Wy2PWsDvSTNCNYowxQQgi9671TlBHuxiFlG8Sg+n

Rcf5xe/2P4gQPgdN95YXSrqZvcpB0XXf3J/efDV87dEFHWtVQcOSqH7LadOHfbEey7Hk2PR1QEBqiRVb

OOgT60UyF5aTtEiPUqCaefYr51b0hOzi8F80WUXHl5
AVvkZneguHETy7y/TVkL2tpvn1cpXWQ8Re/lgzloJxD2qyDfY8DThXsMWEeOhQJzTvrN+38EISNtmhwj

Jkddxi6UFGJYoJtI6/pdsBKHcvOTnlTnrMSSKjCJ+vZwXiSwq4eqVcUYhWcrd14MBhDV15hAzSzEuMq2

AGgRJL/PIdShNLtIDfUduNEqiq8VcQlccx2Y2QReEQ
HOdqwgDPZMWsWRDDlxP4x7/OTaKwPlIvD6ilwwEtje8Hq3WUiXWNBSr4Hawu5Ukwf50xAUPTnmYElRWT

sPSXIWqtXpZMcW+HaTc6i4WCvvqYc/VCVH4xJBLjc0JBVxTaR8bjV9mtqlYBNFsCRxLJd4yvU36Jabcu

lecfXm7yLR58HgrJu+e3Ld1UdMNI+aLL/AR+YW5ozr
Aw7SdneEN2S1tU/hF8Q6BD+QyQaT7LoAYI+xouuX4DW0cQTdW648v24KgA1bhMqgyXpBT0Gpmq9h+izm

sNn0Ti5DTYPGpk+ZanxCuHttmjIbymj6Oz66VSiRy6T7AY2H/YK0toGe+hlitkvrlxqNnBmRZXWrLZX/

IKscibix7nHHS9bXgV+uR9sxifHMnXqqwnn3Rg3i9C
R8s9tcoWRqoYo9Qbyu2PZKvU+Qu2c9x/mZuYnu4uPKJApyLOZVXKz7qHlYI9zuilx+AyCVEaUIeNAyvQ

1qnSw37ECKndV5DTYHx5sO9Vz9iinWON9Nyxs2ppdqtdm+tYk8i2/kx/I/dK/kBtax4CgfPdLG5LCysW

klOqfvqssg6mmWwX0JdIWmQW+qcVrIYNfk9e8YsVih
UDtjTF0gqMiVxL3MzS8w/dc/nHtIPlDgx9rSPeXSXxiysnoFuMxurI57gA3Az1aWhcFB7piE2VfHZ4T7

5RSU+nPTofLm1Xw5Bs6PGTJeWid2CNA0GeZRC4Hbdr1ipkELxinLRdnnyVBO2l1ufZnfrPUTC94iFwpK

bulQUqy12tFeNgRnSOhHaVZXZl9U7JoOkThFfzwdbe
TQcGd4fd0XCtn6rd/ZfkujqpwExLUUxo6pihxaR6VclyzKY+SAMJfgeqYNiyNMr5kNqoP6EkT1xvSYmA

2EBPpQlBmOvnfJjr6ZM+QwUHG+GmG837rf5SdLxG+ystoJMNEkj5p1s4t/gZ59xvALEov00xpW5Jm8hX

wRPcI/M0KMPngNpu2DoeDCe/KXlHCBXk3i5MVvifDT
+mO6Jv4WhANd/1UoffoNg1Fccuhqse5qAZZkfhSOecJJlmXBPist697/s2J+i2zI0M34deg9rZ9DNegc

qt6sSFeIPnIb5Co1EIMjvyLG3VFJKTrib/PxTiyfEnzjLBTC4JKaxjoIWb0s8G/ISdWzWI2sPDaVi4Ds

vsLRO8rR5dGUbaVmweZVyjmL06UVotE7M1wd8B2bZX
W5V/IungT2Q/ndpn9w0DbLu2gNpGI6tVAV7MAwcNl6xG6Z4588ha/HTyNpyY1L+qtZGhB+V875fAoWdz

6iLr3opuuGw+uXpXIDv8l2rLgtfZHzNNbe3NKi/nvBbA80YIsPhh7zBOENQyYoZyB/bK5DAvo/pBz7eT

1bSr38b5MM7gzo1i2hmoo7ggPnZtaySQp6aYJ7iyBx
YBdvqG0tusPSgyoAGKyVf7Sn+vOEmVE9Cc9eXLJuhCfjE3NqRIj17TzYdqYi08dPr4Eb7vpxaLxp+QfN

vtOnnLmtTuuXV+HCHG4omo5TNpTA9aO40UIeR4yrb2UXwT4sCevfo1x8tmkpep+93VvWZ30gy90Kgskn

+xoHopR+sCMwNNzp48rqR6C15CmsqjuAZy2qzv1DOc
GUrAj5vxgwYPblVtES3OzlN0MoZFIPL7IdGJqwZ4LR9JbNwEqNS8Ab/17Z6+Rb2sWYI9NKktu+pzMOnE

1BoVGNcwtWXoQzKlnycJmHt6L1MAenkj6xI3E8Ee6lcYS5EmceRvo5HXkoshu/ssZljk4GxfdTUiRqM+

/5hvlsaTUq9fmsWr0A+bl0xaRyY97rodwtIy7GgW40
sBgV2z4ge/LljGurB3NZJZ2Zz1xXUmanTO78pWHFhl3QkdlPES5c1l0GYxlskkVizbowX3y/D/Pp9vIX

XXnc9h+c48wkmv5fQubinF9oUYd3QHcakJFngbs+rfISI5Uq76tJBO3iI2Y7ZV0Wv7EWZ+P9+IqVPdiX

Nr3p2CuCJn1ke7GMk9PbbriY+nmH37lDyWADu5hX+Z
BV933cQHTOcCiBeUXZj60nx5Qy7lhxCVzRX2i3jWHetIj+NkTgDx+N9G8XWJUlyjpKgFiNTDF5LU+zi5

/QfgpJ47g1R2OWu6iym6d/pt4YkixzW9/wRfR5wucS4PSeDGZGurpagW86xCDx1A7FVI6y44tbstl5VH

y+FDvGKNGxS2hhv/CGpOx3o5tvTx54CP210bEwQ6Pd
yIVMmRGf3J2GHQqHgr53IY8N9hOzIM2vqGUM5gETtZz9zsZqJpFusplTv28Ke9hjdt4zq60qWuTK6cqB

4QJAbb5EaHwIvXD4q9DJr5gIWSaykHck5wT5nLECX3FxyDHKVWmJhBdElP2RMwyEw7sdfZxBP4YCq0Gd

kZpYrrari3Y2wb35gY6ozwsxwKIBFbSxoZD7T9vxI3
ygbj0CvVRmW/J6waNeDe+jktUtzB2EsKNz7yplVELQ8v4HTlELOxsk7CSaUi8P/cdvwp747CmxeAY9zW

/541uGq01KRWRfhFcnqidwofPDb7yAOIJycGF8PHS/ncPY5lbIwXOpkPYqtLMasWYq0i06BTyHHm1rN+

Kz6JX2mmvuwG9u3YEoICdq7AqKBF2H5NDfADok5fuC
kYl9kk3Un5e6ptWawTxisQ5oxkC9ZkwKK8mvIZKzSmA8dUX1LdCr/Et7HXFN/NFK7XfYmqd1P0Or29Uj

bUdjpqCtMVv/HlLaU9KUqAlzxl980uBdSK10lcRrA8jci6/Hh/p7XSHLKY9Y/piDNGMf4nG4ya4A5Mk5

qjTo6z6H7EAINEngb9Ku5VzrmVH6X5ndNaptAmeZCF
4rn79xkkXs7IBGsW0a5/Wc0+zvx5Sft67yNB7D5d2EBa/gT4gnBShcHOSP76ocmh5DoSR1++zKMjsuKN

mAaRDmfJWEDP6cvxz/6RQ1GX8c/Krp0/YkgTHOWlSWAyVM3gD4eM3X+oEaG6PvF8QEc4DtVDRVuppeYn

R/+IL8v++Wa2LxGCal1/wrWb3gtdlcV//Qo/m/7wiA
V60B9t+1s2jaPwX/qkRmsJEx1+c0FP7V4B6D+cm/XhT41UwmKiikPogVVjtEbbsjSfjR+EbUQH2VBYvg

ZFnNBcl++kvvfk+BmUPf1yFq3m+An1amBd12UWSBKiOVugwG0EobwV/FU/dqVOk4t+FZgQfM0VwLc4pu

h0cWzSs9z7/oLqMRADvhh2rMXZZHta20slXbUfuH5K
t7/nIDSWJ/mKfdBjLoA/6yYNOPFfiKLFYmmtNlws9W98dV4O1qKrpKzMKQm2dfb+0QIdMjN3FMPx9Ff5

af9ncRWXJCrsXhvGfxL8B/J1Tfr31HWUNvq+FonrL+T93s+/pNuFoKt7hNkX37Czcyt4Qhp45ZOcXRzN

QiMpPKl2JCjI7JTu7oRsBFW2jz+NZz1Wq913u5Ru4d
CRFqhAojTWJBYIMOkvWJIFhZf0zymhzys53FxywYc5MyNGMOr1fQ0hge7IlpdFejbhfe0tzpVfT0MEbj

ntROHPa+18ZWptFUNWjazaqjDenYvj1PXUQiffnvyCtUdvjgmmMq9dpbpuJUcAMP/2tbyMFN33lu4dKE

Vx6jPIOda4RGAHi+C+EnceDSttjTUILjWysG41D/D2
2Le19qJF5AMhB7eOM2SJ6862cEHbKEf6GneY0UA7dGZ7fAwkrMSr2EDA1x+UiE3QQKePHnSCirMQQgH6

E7rfMI8NrtqPG4NYeQ7B0t4+725FMpffDPD9Q6bia+/4Qm/NRwHm14IVRahuTUIJDAuCSaY+aZfJTmms

/NYQUMnlmJBNzLH6vIScHajCWkjslA/LjrLNhPg+b5
+Xt5BE9wb4q9V/xlUG5S4fMfMijLY0zplkK+ikEzGtCjcyhDVLyEOuvvPTIx5TvN4qCIMvKrxn2ZLJiF

2ktxpBfHItTae7atjsQIv2/J9qDOG5EtocJr7eVXD00sxb/6yP9o1OnLOZvMrC+vhRatFYwCyX2doJjt

54ZbCBVgLsAiZblV/s8EN7T7bFW1v1Uzv1PUOTKJR8
2dQYt1J8lWXhE6lgR5CETt11mE3hdMt5EgJVuslYF6N9Try1y47BL+paX/di0FUvPuram7gpd4UEKj8u

2i8x1yk75SYHSAj05MX9YydoKbNwZ1oLWiLQUmCivSyBvDGWVEqGmhEgAkuw+FPha0ghukapAztjyl4o

4NAppso0h/wkReYes9fyTeFyvfWI+9vuGJbSxqfkhf
Pka3HO/E8+c6loHe1PJEfw41nn0LLmvW1vZspjx9n+RoBROtWmHH+XyB6zeKd6zuTcR50lHQXa93WRns

tGmQ+i45xCWXqET4r9OH9iq1R2NXGzP6KuoDszj+7ntT94k4Leg4sS893V9dLzZ0abi9H4sg8tkLf3LV

obPhI3rVOQFZ48QITbqIVAQ2LYggoN9yanfBNQlv6p
hGZULM0PMrsOI3tGexAop17YUVzM0m+/h91qtjY5mqpLwnUw4/7L5rrwA/KOBUeMzOk/Oho1/jSU6b50

72QpVuPki5q7L90ueNt/i3f/zluN9LSErksEj/vSRiuzmw0luvYcujMCTlg0vOxY2s5Q/80aKQrP+nvJ

Xmpz6pwZ1Gk77nm/EOT+RCdkE/5dkGbtWqks2mDCB8
ljOu+/XjcbC7wLrwZO2KaffJdqhoe7bnAEVXbdoW054ZcpUjXQfrS5aaE7AgC5LMv5aCSUuNMN9j1OLx

2EhJ2D3pIWeTLqEhQzhzxlIDUgXsM0BTwhzFtcMOFChs1JuDJqXFGWdQSYmXHHC4PNT/yDyy2oUgWWn0

lw4kCjiyBSd1gfQXbvT+qJUTR7Z1GqafS3KKHW/Soi
CsKOQuq440g4ZXeMC5i6hdGm3eckqP7AIAWA/ERZ4mFeW9QpF1d/XX5NNlRuZheWlSzMKoO9DqQtH/7G

VFMI637sG7O9ycoMV6aFBzolCDDdyjhKUSFHskt1ooUwfdktXRrSPLQyY7Sb172jJFZwgXKg+eOMTWzm

/xg7u4aW2kEZHyq27pyrfusC4oHkqLlhuqTq+5ilXa
xBP7xfTOqDy7Dt2qisUaeIKnlb51Gh33t1oBJ2Xnj46grHmGQTrhEcYzgCoqXi1Pcq14hX7gYDh9k/Ih

+bsy+8hCCA+hEtR+rn0owu6fOGm/IZhOpidMl6G+Hm61/EY21lb2SXsOiKpx5SHjYYYUhd0Q1gVXnNh4

k5hEs5dSDrFoAtCo+jA29ozurKFYJE3LFZ3AkFn5AQ
Pva+QLs2lCkccf92PME5xwMMgX7qVeauf5YRZOSta9HK/PezDIOFrCErcBbZi6HJB5nH9M+XVyER8zqh

z7srawwkHipcQJhdsx1PjY1LhVu0exE3J99WdkivcEe5pPSSEGSn2mYU3YgWo29pST7j51+D1jXr7u5R

B/6ahj0s2uKDa0RG/6clfpkrVfzOCjQ/dGq8tCNnZI
sQwaZo49Tjg4rvwu/n2bbAn629g+Q1WFIhVr5na+jHrAaDcI1Q84iBqT1NOpqJNIYzY5sqaaOGRhuWYk

08xd0hgRGgdYyHD5ohzD1Jb/LOgxGdFqubsPgzJSh3oPtI1wDc88ds5JlN4E1U1+DnT8mI7gur5PZ6K/

Yu0SOEe/Jl/PPFyPS5EULVMThtLPdWZWAnoAlL1qJE
wN/JlClPIqC3AprNyW0cn6/5f6/owmrrnD4iMK6xl5Jj54+NCdF9kRwR1vvK2B442i4L6QjoNLNRL8Tq

LJqkE9/ErCTxnu6HS914i2v6JBtCh1HgkEtqSH85zE/+OOHgugT1xpt2KHma4wACku64Mbuy8vwF1Se8

pvE/hwK7WfcBu3vMBLA4+2Yjphkagt4xEVAa2hbm0o
Exifn1WFqJHOa7TKWMYvYiSEEEpuGnN8m5N1MraP86oLhx4jIp9erO6sHgZYRZifA8Z1iVOJ4IFz61JA

YW7eiWCPxywZeTsN6EL3yKOcxBW1PlvutH35ZzG5ZNCa2O2OLrR8VIJDh5/U17yQKuaYawuw3/WgPM+W

tjftxkwgWnobG9R7C5FFHeHZzCknfYqIlzS2hSS/Moore
muvOiByf/RI4n/p+J2x5CP502pt7h2RywyLtQgqDCKSx5dET1hAbyWoGqBXOnTIW9InqPYcm0xm6vXwe

RLeaHO0qWH18s10ShNa/+7FV8GZrdQxuUAd1D5gLG8MUmB5h/+BTgs+Sf58b/D7QujH65838eSp8ZJHA

xHeX1uRcuYv9V214lwkzoWfKFihWio2264NN1epSaG
Ce/mqNBPdYQuX12AVeAruRByUVF94yqLsOlZ0z44epWPF1Sv6qL/DZpqzmhY2g31v8z1ZFF3koWwDFZA

GuJjyIQG03AV3NpEPUkXxynKEUW0R4YSD2UhxOVvXM2zb73yczo565LxJPmJskeusDzTMsim3dMXT1+C

YPi+XeXdkKibHiG0GgR1vxNkEMCxFffMwx+RxEm14r
XnNfdt+ut9B5iirHGGjm6+IOLRux5omg7KPI+DnunJ5033DjXQEMS9hPtbwX9I+e6WqHu8ayiILFWea4

VT1guICkNrIaKyeb3pZvu1cjoXRtuCaZT/ct90TOheWIj4A+07vqCFF4rnaL6jloQlI7/Ku4zhUAOeq1

YvZd104KaQiNGQ4xxMVbFR+7qkTqJv/ZYTWSH+R2uZ
/9Y4o8U5kl7XG9aV9ZLyWs1oduZvuyjb2GPuMBUvZ/Xk5Zus4rNATxDmmfJBszzn++90uXN45b+lSRn4

60M9Sy2stN23D0f8CpXvi0x4oItv73ttVsI5lkp+Bq96kyUofhe4MXzHCaFjbAm9VyOuy2Z1LoTgs/zw

n4oy7k1jwugJ+x48inbMwrPEnMECDHW1Q9PhkzVEjZ
Bnv0dO3e0aSLpFVA1aPiCfct2gWUT0FFgIdakthB3Qh5OodapS6SznRrucJwlRm7llxU3uK9Q7dfZ6AN

t0vofR2v+Vci5frt7nSxAp3taTb6yyKt7fPZacWkyKlOVn6qgTAb3sXYos49YdT3QXu0D+bVfnXzX7ht

Et1RrVwmVcyMMj2QyhlrRaLKPVtf/Pf/0O3Cxl3m+z
MXB+o0485HNN9DdCoRZlKx1JrjW8C/QRQYIylMflndkCwBK7UIeZLebi7JnzZY9hXFuhJi9idJVTJZ5a

sQ6EvSR8rxmKf8+PJ6Ul9f9z9ea5R4TJp5xKTDEbt0q8M8pUMYm9Fyus7Rlj6Ll1qmIZlJeLNX+/W3vB

nzx5KwY/7Tt75QjBK/KosW79u58CSpdc3RFvRKoB4J
ulviYhDhZmrh9Ut6cBy819YLeUb1nJPAl2vlnECzlWAzz8mh4dlfqs9/YZ/mopN4i9chE/02r/bdPizy

e174zITKNCOsxRyGy8KxRsuU5/RQwiaK5mF+AUWl504A3wh1J077ELaPTy8Z3u0Hg+NdO3MaU03s1EnX

Una7Hz/XPJreQfI1lhdANI977q7BaVNyzbg0R62XUq
wL/tcfyZrv/f5yBAvcp75BG/W6L+wjU9cnZ2M5Mz6fbV93+0/eR1mjPooVHj0M89e1tzq/MyC92r3oJg

ppTqM9nx5loyDtbyb3uX/rn+kDgcmsKuEmvo4cqa8N16IbX4YbSdFVxz24KjYGNhWy7/BpDLJ/CH9n/4

FxkFD6SVkcySK9fIdpx09v7y3o+Zz9iEDKe/dxOvBc
lW/DX0/g+mJX/8+4y4FutZq0j7n8/u79DtBop2WbtuciQbao9bOCfNm1DRlgAHbbEmuPOTIsZIYEGOgq

3p/rt6IauIW6WG3FR0u9jbbWQE8Bw84qcw7ssxv05GR7H2UtSMOsIm8QvXQRmPTDeh0S5xcgAIXgl5js

engyw8U/YGxALPKRFuko7Qm9P9fQwI7j/gQo376zAv
BMXKFei9Ic/Rxqysyn6L0kR3eaEdqGdOf1H/x1qx4ugKQsf7lOjXO0UG5POxzyP5MlA9t0c4jrcbvvs8

aYUmWhNGBNjVjS5dYQLYYQUEv39wNKDbkBlhyPjsGF6ybxm7fXn9lGEk9sx05WHerpOLlZQ3MGRkDl4e

1BhcXGia9HC5Hpi9bOI16zx3vzJJ4OBSn10pe5TYS/
5VlrrDFnBJ9gbnMJoXBqLVyXhbiW7hUBNj4HzVbKiKAGZkDitoB40ECCTq4c7czWNQJa/H8tlT6rCjG9

VSmd7UYS95EGwDuLKD9dybNAKSRym9M2sm8ZHxVSZTf2rrCX278b4MKLqBYkytwtZeCuvQ05S2LVcQTy

uGaRREfU5YxkqJQJXaHKtuMKKDkuD2n5zyVjy/Z3l4
DxppG0T2+6W6hCM39VsX7ZrKPfaY5gkB7Mzj/5KllOhNhRA397dObo2nkU0wZluqBBKGvAZkxjW2tzdV

EbdVd/z33xIoUhGYbTX0y/9leyJ6UmjRktmLXHQ8EFuiBRIr69RSYpHb4jM6awzqAB84r/gZGzjG/fk4

f9ebp78JCCVsrpUrpCCFnMTdAk6GXEL1/84b50/y5I
UY3w0PwEXdH9JgsN3UT3qOMaagOn+NBHSdbIL1pm2qrG4aa8cKY0/AKrKJanALhbtfb9Lyf/LiI2mRgT

wCkEY9LsMKhKzKn3kqTEOmjpGhFP81/xL31izKbTJKixa2hOs8uZMfC0v0JUoQN7dU5uSsdkpOvMnRa3

EOG9EIorETL7dxIQS6/GipZLt+erTh1tDKEQTHuBfE
LE6UT8OdFM2ftSerdiDDwE0qbW8+ZR8ocmA2E73lHKnMREJ+a2k9IUIwBx/cbK7Xw+HBMNYAW/xjScKC

25h1hpqoFv4DWFEFezu6VCSt5FpJNNfzjcL+d7z1418CbMqyXprdwL36uolJNiLCzSbfLKB4nBXKgAt3

9EkUJxsn28iPyOExUCl/dWC2N+hRCN7wUDhpY0Liwd
Ho1P754YMRqiWvocap8hlSnJeTq6mODQsubgjT7lWLo1a3oWb6O96rzFURO21taA94K2d71wcE8b2/p4

PiOhZy3hC/Lj7CYnoKW7ahnKqZEllEx57DsePm/ZPhgoScflhYw8kMwaeGtvPtrgwxgBH067VVLaG20m

fe6+bEgFCrVnoQoaqTOKSiSLkxY/YgvacdKKNBnMuc
GWxFnpBwqb2XQ9XSH6MdcJ2eVLu7XuvZBq9FYa4LOzUJ/MPtKFVze4BqiHnBKQxdwl5g51lO33ziln0i

q2/QFLH3b3L460Rt9YOQzTzmfnclCTo+q6+zGG7pvzlzhrdP3/pmIA+TsyjYBIBdEAesjqODOOrdS0uA

5kBmn9mo7V0XfTe4dbxMfFd3IZBBFQ949LExFiX5hR
XmGTPhqRqIvdmllC51TdbVCdkMYVkPd/FBawV5pZCVpygGIhI0xabILFqHEwkZ8a796j3RH67SN3KdLO

r8T54u/PaEQZWylhE7u/+Vw+ZXDKn/2KPKha92W09LBtI0q+ppFKn0UDhBLZ6JR96+oSQ6a7GbJ5hm+3

54YvoZ7Or0/XXb4cMXe9V235LVufRca8OC+8Uj1Qdi
+inHxxSYzD/7xBQqqiJ4jtiwcx2ysqKj6pbirC95vVcvJg01gRV3mToTL+G5JmAbIw+D4fAbzvg0cxEw

uAJMGszLKm0KGzR3lhlNSEZDkKzVFrSgEof95T0RN1er2clllk9ZBo4SIqjHofOMDbWhRxww/bxhdgae

9MqYatq7VL5xIHrB9UmkZvzZN/j6JzIrZdHLwNX7Jc
1awHnzmmIf/O+FGFfVxba5/bLClY/5Lk4LtG6RTVjekhRrVjmPmH8vcd60cTvHBTLTCJMV6nHfRrtGkr

+cF6WEo04lD9WXcd9bCtSrP6gK9nVwV2Xz+Y/axq7Kc5qDoiSxpis3ajzNJo0kgm8niopdINu5j2TYug

+/OlN/VAwi4d7Shy9ucZcYBtdyXWiJisBfI3UX4ep6
jtytLZ78t/UdK1gsvwk7Gn4AgxiHaBjjmHx/pxU2VPBVMgWq7JlHUz4sBhgj7KoNhJ0P/Dmk7KJU8gds

Pz7s36T+j7lQLpjWY7avoYmU0eztEFaxSkPfnSBLMGUzfYAMtDqDx/JOowIstnEwOadjVHVrP5x5sveo

JNKBFc1jcTGRhxVEbE/fXet8Z+X907CBcLeyBkOO9m
kRXyocPYUt3Gceuvj5LA6EbVgKyyL4VXiK9G6O6ZqLwI6s0Q2RtyCN36V5Cj9+TAxj6VdnTxyUgdAgqU

JAYgo5pmC8CDwzwTI1TUVMm+2q89ca7zcxi0M/Ty3dt8HRQ8SM151euoHTo8EVY+ELDNOsHkmVeTZvZz

cM6X5/Ztrxyi/WrfZsC+PhkeAeENfwT66yZDWA9szM
RJ1PMtq+gt9NFencnC7W4/Lp7l4x1F8u8Z4gCtkbTmjtVI9BLX0yISqKlLXXfIQImgVNO2x0Df/HVo0Q

MbiCJ1BWjDSLR38B0waSU4TPd/LO0uw+3Nz4OyJhumUAAZROylsqlEDA2ycNljlvp05cAMTJozFhQgt5

JPTV+ZGdIoCgG8opNEXa0J9Jt2qLUEkkIBeePeRPR0
xq1J24li2hWB9OVhgugelSucViM8cinsI9HpUVzyuxarekM/nXzTsQsk6X9z3spgp/e4WCZybpsreT9L

2epqox+eoIe6+KT/UH82W9qupgV8Vj/D42YNKRb7ubdTquX0hSsRHqflYzSRf7DAvcAlLGjyIcR81+d/

idfK4ERfACg3ezi7vj8Dl+KykNYeNfQzm0vAzGqKH+
CfPq6/Q6HNdva+6fa+Zf+Av/mxrEtAW4cdEepjeTfUnGs/SCDN7aVeubSLZ8mmKWHOVyoVkVQiCcR5eY

Cqp8WIT8OSygiBuayx+Y72/pRSDbVuYrqLfWSJ4X+uBun5kAjGJzZhwF0st8sFIW8vUCcUh6XnApb6gh

U8+Vm1pCsoQldqbP/JtvFc53+Ytu0Ng8kItviweFop
ZOpAJqlxvLUqgaDlUhDV+dT4x3Mo3xl99nHyT3QsKDRwnlpss5hKdzTjVJPGZFq35jxV1+uimmWg0IzA

9AjdsPM58qGeCdT1Y4MTyPtpYV8FH1QHtVG4S1Ymvm9kmYsPVzKVgj1EQYkf1x0tixRGRR7ANbBhTHQe

kx/xpAl/iYUBVPpEttplhSaC/lcq8A5TTJH1efhEOM
mjS6uc5UeXVVtgJle1PsULS9vKWFVvnuNojCdO1U4QYzwevocOrd93r+ljvxlmvahqeF3YycaVzW1Af0

EjkaVdT8VwQOd9Bc9ZqmpwFcz7Hxj/2O6Dd2fw0asyvbNngWLhwfx80Jt3moUlqXtVcCM8Mgnp+4yvNI

2XisM6gTLY86tKAK37RBlY4c8krG4CjwnwIeMMQiLr
JZNm1XzemzX3Is2iZH0tpFlCTNSA9ToFpJEhpoA5ycftJCukvTbOOMl5512ikuz4Ous94Cp57lJyBRWK

/6nRicaehNkPoh8iOQ07JcGgHz/IdGKWkb1kHOZKBDgI+DMT+VWj3b64TXghCcjY91RSkQqhh2FSu2Ot

VfHKn+Td22CtnfYreLPOlDrNFxKyNdTZ9wa2qcuoUc
M1uRjdBmj7nf3Cc0PcKX/H4x1YnnEhFcZyKFmfTAJoBSybh8+SUcOCrhqDV/BBRdXeIfd/CJKI6CamZn

L1t/X6HxWfRiddBRRqM24nvbbzm5chCUmgWpYc5eOHL71aYy7KBz61RXbPExX+WXCYLMiQXxzhIjIvDP

TRNl70uW44jJmfOpDaIuN6h9JazOCBNpBHmbhEkb2Q
TjKm9lX+ZNjVg9kP7nHlZSzV35zshNA4V6Rh57OaOOR1sYGZqY6C8fqM1MVM+doMiCEUrzemz2eMfLm1

kzKLWju2t4QphcV5uQGt696mgw62jWsTLwkJOti4jx2dwAvT4JnR4GWDyhGMcX2n1vyFeTYXw3gXBsut

FUJW1ZoYg5Ps+AVRAYd36K0+RQNFd1yVpSVyvWlmKn
r6kKb3EnpyJF09aSNobgiviYm8BHb4wsbngsMMWibr7CjaSc2iAaaZ6i9ctJhWKgQ9JNhpKPINUnFYNL

g8/wTNqjrX/rsyA/wAgzqB8ci5uH4aqijTGYljh/XoxumTFcjbAVn3ZpzxHFK48AxJoCETJADgGUIuxr

b+/8xvDUk1G4fz7exQjPm3hBxxxHtpL1Va5sOMvok5
5R4Hch37dI/thTKRAVg5xO8T42SHCEatOha+ONbrMNP716QhxDtKv2Q2z11FBRdBLj+vuhVmWo4NKGPI

p/Le0AdrZAbbWea+IcVbhUd1nE+gCo+p/4YQMc+Es7kutA/s2PBxtm7zs0aEIRpqobBVUOQBVLKbPR1B

ugOEA1a+xihqJAM0Z8qASJVxSNx9QWOPzPdQ5B5Xs/
UCvR4BY1ZS1exZDsEK8C60hAC6UVNCzWqZhfxTHVzLpkGDqoUDh+IoM337Z+Zc6xtnkt6yBpOLw78N/f

uTdpEx05SI6dB0Q1lX9sWaIxHGbvK9ZOc5Y0UFttXESeAIMjKKQgS6uDoHElno7HKyyCxBqzdOObDtL0

8u245v6opbEYdOJGz8CaFM82xJ425GDHh9K4TBwJXu
hCdmoZZjJRT+YNkSQ/k6PhfY0zHfOjpA0fwpEQAt3u18Ki0djhlaRhxB3WIvM6sPV4ZGlURq1Kf5g34h

KmBLy0j6ZlA3zN445rqd6tLG8XHac7pGg1d6S2qAtsca7lXxh6LTlrQYfpW3dCPLrwVHDDDrWL+cXGu9

q6VALrz82QPalEQizHPlldEQowRNv6+heOo9Rbn4Q2
wYhrqFOxGZEZvThYlzQqL+V+atDeRGlBuE/78OYnwpUBdHrC7A/J7dildbf/EA+sKR2616bOYY/Nm/YQ

O9lODJXLhhPhMTMaeU9nuqsyEmQxwxJH8iBhrSCQWMw0pztURAP8RTBEeKg882xefEIYyhdqbempbigc

pcYeoqWXwUoR4mjhEAm5Hy4EDISJlsiS6LVO8tZ+l7
c3ZCG5vNnH4m2WG2ZlZYtr89u6GGEndmgKh5FhGEHQcfAHYEsFIqW2UKQTxh2mqEZYGV1IXrLRt1ZeyY

HHiJfKsvqu8sxSA58K+NGIjC7ID8VkrhsEa/OP+5rA567YR4sV5FhKyAxKu3L/Okd/zaIK52rfi8v1To

7J6NaT7CwVOkWHpZBxb3AnifrI+wYQlRMjtCtWQOxG
M7bM82uba1+VuGhQ0tRCb19uN09+dI+DtPdXIxRijOb9lq77G6hVpG8EsYLtTS8acI3zzep3ekRyXHxo

0PsbwhZyZ2D3Jx3m0HuCjWX5dh4/WRnAZIWch6h7cIQ13HsmJDosUTpiX8nHGZGikOPjKHg9GwS/uh8F

FjrnLdjVi7r7cR8Hpkf4chC7euZFByk+C85+7m663B
18RUBtMkp5Dh3poFISCruoNhK5+yzaxckOfcAWztcKDK2i6kyUDbZExiMwXI0SyggWoYBK+TNHwdWMXr

K1WwnZKqbX2IdbuwKUdjpuByZj+B2c/DrEDB2WiFEsk0B0tsKJoliEgtvSHhFem/uTGBhYo+ptcfMkn8

Ni0cqkrKCbjH/JS9faYrvzVT+87hN6Ww10MQHon7Rp
girL4KaGb+BbP+0zghwHRUCqoZaSn7q1RacAw2TKhwhlplN241UvPm7dcOtZHr9/E6qIfl+ZH2wlAV3o

bmhp+8Or+dsGoLz4KH0H+3r7JV5VU/LkSmt2SuFtjA0GvGCg9m5U3Syj8I0NWzcgi/tjuDLyceG2PYEA

+19+xM5HWYP4UHQNP6p3AW2p28j5jXxSRjkeLlvciR
gH9N82bqFdTT8VP5c8WBTB3E3BZR3QWmuMLufhuUSyF3ySVpTpEjQjl+ziGzbYtXen7qBYvy6hCDQrzo

BPdTOf2PBkG70CR74lLZaByOZdW52gsA5EV8YTW4Ed07QYtfJ9AA5Lmrr8Qe6h3MurqC2k5CpWzkhEdz

bAGEvPhFze6QWE0aNNNX9qnOSpP7PYah2uf07jAIo8
2R3G0t/ZEw1XsTyvxC8UN8fqM/FC9lDfEWg/9SXalHeJyh6RuWTYd6pItpBtczf7natZDt5XRlRkffa0

S2vj+ERwg72MzTjutbla29WgbZ80IWntnYbGnvEAtej4E6DLfAjJXqtergUN+Gs+k75ZBNysYeEcMI6e

aYgYJT1qCVsTtXt2pHceK/oPho4NWlGXTbWgJD9HaL
ikfwfarunYPIm4T5/dXqCtAUvZebXOSfq96/Fb1TpEYQaEnu0t/6RkWbbg/zAGpWCGBzFgPVHGH8FuPS

52XWMcXcuyvhTlTEefosxZzQBy1onSPTNFrBC8GSEwEuXfbvDvWu0Y/0WtTw6+zopKkJ8VQbmpfA4vUi

WRWfIIFHTMkb3oGDee8rFkiYmIDvtFWKWbCVKts1MU
tuVHvPRsE9ILzvx6gREz1lQo8lYL2TeLYCoce0GI9Kq6sz17flJqHhU+Uvm3kY1g0/o66K238hjL6cGX

lt/3CS9sTy6g0DpyIgFJTT7o5IhM1g2prn19OY25GJVkypJBjyrjfj0npKnoPGP//h8hAC8W4qr1b0k6

0/UM7c8tuwJLFxW3PpW0avoSjkAH3BnP2UZt9+aMls
IgY5Ldy1tNfSKilCdG56xJkgzDGnsSnbwEFtt/xb8mIWy+hXdS75jS2XGpweWxm+iybmgkWekjvz3iK/

B1+1WDike/OIakCZZWLal8hhaYoCjt5Iq1YW6zCANMR75p7G3+TMw1l3lvxWzZA+ckcv02+4i5gjssi8

6DQCj+vzF45EWDmrbsrFmQrfX5vscIexDGcz7IgYMm
9Ra+L/gDW/Trm1BCvdo22+J80nC5nrfUgUpQQfIGNpzNgA1WGeIALCphQA8aluCIvL1R51uIpSuR4KmC

MvAXkEhBdZzyFFomHZeQTp4z/1nfG6uiE1ff8xY29zbmtdg1CCWWCIQ06HonwYYAJJ7gtu9ZCE0l8wVB

jIUvtr0i5xKx6/Qel8A90pu0nRcbtFNTFaQFnKGvbk
8p5kbEDYjYhloMwhu/vvTRo7/NdLAekntGVscnCYsjYiJx96DEyszNZlIKXHQAjYWrNsqPlA2X9G9XVY

c50uBhuh7NM7namOqhI/Th3jGTt3l91T75NaWBnLRMD0f7FLR4b86rU/luD9ePT5QtYQGn6VcHMcxJRM

tnHz0j5Qbpsy/goLzxKxHrvGicf/yNrx54GFOp2bDV
b+M0BjDAe2Vw5emWCp0lbOC7p7ksL/QdJ/tzzXNcmF/47KX5z049Hz/iI1ybv3Ic/Bv2lnLldkuJuuZm

5hDEyku6y6O70if8RQaZcq1XY6kh34Yde9uVaToQKhSWz8KOc9ezm77VBSf2OTh646ZyEBJ4ntl/3gBY

l2oVubQqZn9TRLELJ6YHPsV68zZ2/JHmxmCUGz5dhT
W+1TxEdIDRscuU7pepR+ZDOVFHvSAibuSsQkvqMS9gULRRilMNkcbzipKYAyKDkBqMrysfzcmYy4Fllw

f0h6Hy6szkdIbrR+rKGibeoX0CTJLjnaoFHJl0Z5m3k2MyudGYcTSbMuzWo9s2CIAHpSHQYNIJeX797A

UbdJpxQh+Tf49aWSJZaBM+5X37cNouEvpOD72HibkD
eVoD6bf+0+FJBxvIgdyb2DDLubuYf6AZCdaJdW3uBnyxkd+quv7g9pc7DMqd89NzM89utI7q/EF8+rP2

OTDTVh6/GML02MS/COyn6D1uJjEAd4W4+rnqXQw1sWa+p7xujEcHUrAlaot0b3z4luXFMSmIyl7zFDyh

rvnQW5m2Cpo301vlDXEF/iowegyyfmlY+cEm36d8L+
lL1zDxx4MYiMeAjzk9hHwOcq+ZI+iJlEb8OeOZSgOPWI1k6f1NEg0WRxjUrsAw00Gj5F0dY7EsAsK4f4

FPqDXjndy8exA2BvNCzCVt4urInf+hFisg7IC0l/CMkI0J71s6WOimv1fQsipkZzUuiwATEI3HokZlFI

c3Xk+KS35G7LEiVfqcMg1TZFpakJhim/iunj+IJLZx
nxKllT4rmvCiCAApVg1VVCzSEo1YLVpC9S9Tv7HuT6pS2v0i/dR4EKE4kO5KRBNVCr/iUApIQjT4RnJw

J1cYYorMPvXqSoGYmafOB6L/nsrO2hyHVNOlQE8NKnSzIKiNw2uUdTW2jexyIYTbsMf5J2KuVF2Y/+M7

WsPt2Snbcm3HcfRwsiLznVmMPAX3JIzLZw28vdlRda
pefBP2LLNczNUwnKO1RALUEVnOasjq7BrdXAENXaXH/xgSBV3lN2q3t3mXVekKsaGDhE1beFZfYiZllL

DyZO7wEtICjzchZcAq2kop2wgm+l6z+0r8IgonGEkdBNKaYm6rd0vmI6PAvGwQWxUcZfeHZdm2UV8Nsr

WQUUfGqOWDp34JD3873RWftO5rr9XHf+xmqfIJik9s
idM2S10U+SidZsas9TJvHHNlFpcVxw6rmhpPVf22qSJfNtNXlRXgnaM5mAQZQz4jstR5B0d3XtLeJZEe

2CUn6Dvoyr+iFj5YbEoqFrZxWRMpYOJmbSbM8ESmH3+9452UCaSuI84FPJx8zsRwK0cnl/Zd1YaxY16N

1X2xLK7zstYh5TOvyW+xr7gBlX/JE9Zf8Is/kNt5ma
tLypJvgczbvfHuBkIGfu06xFEct9AOZD5xrkCN8VA5a6FJXRXnsObrA9UOrexLFYri4nWJKAg1Dwt/Q5

pLtd73yQXQbJkVGBYvWNXLZa7gVLz+wf9HDT0iudp5DniJc0Pc3HzYxFz3Vw33m0fQRnybFbwysw98hy

kg1p7YgYQIDa50g8MUjDqd9G1/Clin3YeOtucFyVxr
CiMqqb1oSb4UaPRVFEB9pIzNKw6vgVzFAddEigKmwWvk9o9IXT2mepyaYjEBnp8TWf+F8SbRY1UUQh3B

B6Ui6qWqUCzN1ORX1Gi9VdEVf1Z1ffH+ON4utChm3wGUPWNx2lhoar/jMPhKJ35uW/Fkw61jVBS13T6f

hWuWUni4jW6MtKcFupp4IFlDh452cCkn3UZ35BwLI/
+lFg/Y8Ch+PSgO9sC3t8mZLL2fojmfcnleCQusMePji/h48agshjtfNGCec3iHzH8PcTk3I7uR67yDf8

Xh50wDSI+CCRCe49eO4PXzYOn7Neo4TTy0k6gbq76F5JFopdgALbBAVKQxo5Soo38tDgMPvDGuE7IOsq

2eI35nyZCO1RhwJHOzev+OxKnorJYCtvcuirxHSqys
HEqaDQMTWxHDxsmaSU5fv2TyzupWrgrTIA1+qcYQAKLC5Tpamv9tZUa0MLaOM4dV5OECgj4EMKfNrjd3

MTeFOe9MHXI6lktY+pamY8NtbaLBSye4tg8rCPDHPXbtwdcyf3WrX7aGL3jvRDm66c7uh2KHc3kaIShT

cT7VfgZYHZflvUuMEGOV8ZYfoPSPlDyTN/tm+nLXh5
vXap74DVNIZ+plwd/ZZGlnsvLfLmaXXMSynRY3enC+Gr/TihvYzVV/8QlIQtBZc6hPFzkFOOiJUFxBBf

qdZHpZoKlFK2Hvzr1yQSlVZ1DWryLQZpOteruGdZoxdLkXsiz3j/NUcbovkpKgnbOojO0kW/LTetftaC

kFw5fGGoBBhqj6URXwmpEhTaG4VakOLaF9Ky8xmVVK
+Pl0qVZ+AvUOQQfhZJyu2IEewnUpsKPeGRRNU3GFPN+Dvo8Pdw0cmvnwsyPwyImV373tt4YMwXoYHasE

RP91LKElzGPLgnXqHhZ8zodQzdWQQRGn+ATREbUiYQiUompqZta6UjwaCGZNaufnHyKC2NKQPvXDhb/q

ZYWf+/NswhGnC7ONXiWTImPcFeb2MSmg7Pai+fzsvF
EUNrzleRTDwJoIQWOCvsSuJkxDFQR8/u45Hhlk/IX/uNl2JRFZ622V3CENCw6EqwYoueVhc2EwWQgcNi

41YvB5yoa21gRuQwsJkjNMeXUeYUx+AytxMdzZBLihLfa3hLmO0IF1UjeIuoJvWYaRJ2rC6a4fmy2U/F

DtCgcEsXqY2DMNHySQRqg6VXvB2u2GclCpy8ibB/xg
yFD/FDMlp+k2U5wci/4NgFSUQEO/y6Coolk34CH7h/W4IwVxghR5jjiSbFk2ZvE2GqB3ZIrPqSM99ALm

0YggcFnQ57rRyZl6u60GHncLAZ8+cXGy2dGgDu2ZRJwZL+/YpVcoY63cBEAf0ibQla6Vl6yGAVnu09me

31Ry88QvlLS9HAW9S5EVW7Qg98KMxN7sQKDat/Mt0b
i/DA4u6sW0f84Cqm1KukkfL5LRSOh+oFmT8hkD4+HjlsaowtrjN+d3QroTguUQaVKO+GeE6mRM3HdEam

mp5kQzqIBNxfh+fADyjxsnPSRRBdKkV4KOKhQhziNW/dBWA5iO02dc4Mt9nQJWQzDBFYVHi/d5mQfIlO

pyv7c/XiuNK6hs51S2Pmj4175BGfWCeisjke9QxH6I
JdhNTqtgTuS6an5+/Hh6n82Ba+lRk6usiogjaCoUSy5+6N4YjdhIY+Ggwk/KMxEmnus4MnOyGY1Jlf1f

BDmyWOm62eV6OBs9cKzeW/EFV6amfAkknq+/JAcDea0YSK+bmdcAK+YsfJTACB5aLHBdxqajkcnOCNgw

nRzlHV7C6XbWTRUa2+BXlDNAtmD54Il4ZAb7YWK3tK
nyy8tXeLPj5JFrShBhrpMOudt4JZwH+53sjdRn9uG4iZbQwf1Du643BM2lDYTtZhTV6ak9gO6YOQFIuY

kikaNReCgj3/GdJE1vJvPayZmtpN5+o8ehP+5Sl/hq5SY595PDHdZrSHbqvEHzjVXvMjmaROLCpv2Aof

x8VbE+AHorey+Hxaof9mUozhrip6dPpvCLCAoC9ugl
N/o03IpphTDfboitu1kzTbi8QZGe2N5QFOzM456Ssw1fDEJp7B4tQPPF2geI0qFr+kh+7IJgSJ6+rksa

uyCpoXvMtTGGjAuIFT7uuaqqVo0un5FZ82gVcAQuhlrDfpjnrvfAMLTUeCH26+MN3ZZesPRku+dCHw7b

Sr5xRKe/VlF9hbqItFFyNE2IgpsGb9HvjADHdcgtOa
M80GQnbtrVSyNsqUmSnmPiiwbpkE9IQAoUvRsrEauU3lVpue8vGltB1G1kwYg6KycD6gx5MGhn6raDSf

a5BV0kRkHV3n5E+nQVlDNnldpYA6YYhMv5Zm+rYquRsEZO3nN4Tmic3A5MwvepahDTIav6L4J47bWE8i

oy+NslfmgfsYXTAnBpBIGoafYzXLh2rVcfIslyx7qZ
P9yKIMr0WfEja8dYVISqENj/TFsR3ljcNaTnYdOHRtV2QJRqmliDzs4C7x1ULOcJwGWeDXBZ/q+eszMm

/ifRBiXEOZZGslwwCOTeGI7Atga3hPR4ykl1RRjBl/NxuagZNuxW79aakpfK1drswqZrE0ieEuXCN5Vy

s5TdIHiqdnTnal7ISpMWDA5rJxjGTOsSOHdLde8qBG
Cv+gVD3qkO2ybN3rBuiFVIh1i105izWp4W2RPY/PYByaj8Uy50A9gmsR/Ggec95VDO0kcTEANpyxknfk

jVB6/Hc8cPfKIqGjMvv+ttxOPeaGDYzt2CHYHBytkzg6yNc7oub95kLKGKN59zj0sQ48/CH8VvNh94zs

eNT4aTBh0ZYDigMMtZ0qnX7neTzfULVbqIzk4muOV1
qxrn094jTU7w3Dxzrj409vCJo0tBI1oX2XEosv8dRDmQTYHDQrMtlkJ0xgTOSiofjIG4vvUywPJ0vYnW

89DCDFsVLuXGZHESYW8q2uj6jk/Tm7K1xP/q4XOKlg9kwKfpuFTqQTqO16yBeHyvykDhtwTb5KRZztFI

QGxoZJi7FsHMMF6546+sJSjk9lFJ6+Tn1AIXd48TYK
rTZeb1ZRMUwslFU7bTf92U4r3utuVJt0pJW8XvqEra5AtOgdteGnzMFNvjTLJuO21NMVOWOlFLmQLk/6

VA6Zs7Y+pJ6WNqVHfxI/TWKnGpDH+27s53UkBXC0tCwFw4sX/43WmHYyFjD9i7yNenfNKbT5Le2lhWsB

+yklRuDfhuvkAIEWNlxaKt0daUOSp8xqh6P+wNJ8g8
x9LXr/zHqQ11mdFQsvB2xpC5IhImdpp/1rPurVWpkAcRHXIrHkNO5D7xr++bAAULjxtN1fQIGs77YYhn

ViyD+29PoBuI81JrNNct3iZUD8dtzMaj+/agpLx+5g6242eTIhypwxgZX4L9O4SkqHSWThhHw209yJTi

M4mruFbwEQ8pGIlfuAo8TDYoyfjMw7pVkjG+5vWReD
p7J7urAAWgNNKRROg7G9XqCObk0BeWdpkgoyE44xDB9Y9tZ3NKto6VLKPphGxI2OnzqdYAVsujphM2Jj

P86o4TGo6AHA3C9Ab9lsa2mTFd5/FtUF6SCSl11wHuAtM9+I4SWZ+OC9GDYwazUNbRS0HBmrclIt5+/R

ILx37oyFodQzcxbB86WUbTCw59hN1osSu+alMtwE4k
lBc0tmDno4iROrzz+7KZROUGlN5xs1NaaGbY3S1f+Iqh6N2OwwwuXaYa5RiKUlEXs12f44d1hPGgJC9b

ACb99JzNi35wrz1xkwgozkTJpNf8KGnUFw3wn/MHIP6Shon/1iBGDayxvjLF2l260izE1mUKZaCJtWR7

FEOfTYe2DkENrRYBYyirVM2T8HSUtGOP3KQH1qONiR
X7NrBgx1Hy4UsVUFJPAutT3/HmpcOiCnq95YsoHuy2RYyWOu/Ql0rXcEftfNzCDX18Y3DlqmqSBz7LoU

JDst6i0JNKNZQSkJtHJ8pVH2lIdoBKLoC1fK8ltkSpZzPKIOzrmN2SnznRdW5ZCttKFcSrlxm6VHtuHA

pxoe0Y0UdC5y8uwtAUIfWInbioRN/c2o0sMGGb3lPk
0KK5Slse4vi2xMyixtXFVifDcreF62Z2qS4UzStL7K5lPLYa3j4ar1Qc38COFlwOs3ApYKF3GjZGlPUL

15sDiffO/i/23ioorujrF+gFovUwTpbDcCOQUXAOtQPtqLTUSy1OTlIjMrFqSSONlhadgKmCl4LID8Rr

G+2gw/+5H3Olb8ietalkd8h2wC9OsIrb4Sj8/WTX3m
m3QSng6jz5vgOcHvBoM27nMUglFaPJxmU3/CWsYihIHXQZlKVE3nS9aogvzZAQna6ZBQ1IPbUKqpHwd+

iiU0ZJL/kBRSpLlqRZ1nRaJ1evBA4FRUh7fCzUr4g78qjMwV9AZWn/Z+GQZkrtuUBEBrXHAQa0PfI4Kk

dnn/LcEDRcpe3490Xt05RnwB9t6MqUZyfPJv/JdacB
I741xskDz1P6x2In0yRkPUSfIZ7SGzubvPqaZB+A2qExuz1KiF9TIjDP6/hhm8MjV29X4FBvGOMo5xGB

KILO+xjf3OgY1Xnea9hsUz7kIj5zC2XxgkFdZML6is/YnuF7x3VR+LLGt7XepaLEkpeUXWXKQkAjFcccLp

lP638VzuBdSqB9YUWN4f/euolxRK+hynNLexzWCggQ
xpxqBUpM8tyfV8jLBrV2G48Pr0STPugNp02chV+qhEJPJnXV8R8P7TAbHF9iKUF8ZJXjiKpHc+uLfYnq

GOhFep7bClwFKIvj5XL1BJ978RoICscysw2aAk7C7J1VbOzLdjdIr2A+e9qmD/dpND6SAThXvOD/bIE5

YQi4kzW83rBysM+Ldl95MX8DQrL0QeZ5dwdWL5+FF5
512rEZa4QhdZF5YCFvVNHyI/XoUmL/AEO7A/yVJK0FEzSvSA87Lrgf1C6vSiROBl0irziX5mhHvzHWfP

zLh0Q/VNmbwgJ7e1niH0TnuS7IaEYYGGp9lxiC22onZ6VzMHv7SBJhPAwkvAQNc7FxcH3lmn+czZunn/

Cf0u98H4TI/2bUo2sdmcUAdHKDG+IdSGe5hLtVUu8g
OS112h896JJDtJF6L5ZsLBLYri+j2me0dI1iuQ0gN4UFBkHCRnZWv4C+tWfYONUoQMyrXFuWlfvleLwF

uS2rgjrvRQCo6vFeigsYPm5TRI8AfVihu063yAVqTQt3ac+8v/72M5oIognf6e3zdr6556BnPnPpl/0T

4uOxrloQyA76TPJFib6eICh/PsYbgVdbNfuEfZovrz
aIv6J+3a58xS8EV3504EBEN5NJNm/h7gRV5ycaQOieFVujUUK1e1RRiBeiHUH9si1w/ww8AUPlpGrNk5

+VMGf5q/s0trsHG8tHZ0hDJUd7kKiLu74HlaNq6KGD2rN120aF4X314O0dj9V7FaCW2qeA8dlCpR1iin

KWhwFFPNGMc9eZ4v8IssU0dQTJ9t5Rz+z33fkD4Nxa
A9baePfQjhBZp/Y+JO4aJnzgqHsoeOqju4cpWux0i+tvgWk0sbt2OtDiavPLDt765JjRlZZpquoSPgtM

3ooQoJWmm7dXQcNxZKnmBmpFGJNSJfdU/ffYFbg5Ha2qoEu3I61Cnu9gNCxllLzPfkCEurktq0+hf+46

C2NEf5WZy+OzlPlnU32IqSp5pPEF7q40WtXbcnXO2H
HN33yFZy9QWLdJqD+11fVnMn1QgjsQQF7Y7u5uxDSSd91dvT2pEdGHnBtW62eRy1qeTnSSq4ufyF8czC

458GnO4F20fhQab5WamMJ78PF8NyZGr+sNQQspFH8Ry1O7+c8WP1MPXZMAxbDIcSz+hXvFqaJO+q28UB

vtHT1cTd9WXjkKEC3/6TUszyLB3iCZAFEeNHj+tet4
F3LrVIwubPxj34f4ZJ3czz4tt/Mdx3yJGFasKVFXQIjPy4Ex1tba3XQ7xAUJygLfpackn4V1ZtR+Pb/Q

mj2ourI5n3R/2hq5ZX8KBdQuVyg9Y7IctUCyXXJM5WOsFJ5wExJPjNAAOE0rXgJMvGV2StazPC5Hzydl

fECcloEKkOqfNsKMzRZo0c+xftiMd56X/IxbOcmd2R
9hi/Wr6c5xKpM4a5hCjZG/4w72MGuOP+HcFN1ZGHVRo+uIz6M4LHcgor1MT5YhBKylYn7dyQhuG74Ar3

vO3p+mhMS64dsZ+nKyznWiq3KE726eMuxDAEHERjZxLvKDSIpxfhLJ9aQjdPVgfNviEoRs70yedT8rpl

GWEIuH7JAuGM4H9u/e0diuh2vphs/6F2DlghrSdmv0
rZBa6IS7UWgNywkwGwX38HO6KhjVfXRtxRs62xKEzWz8ripCwsaEQn3Tkn12jyu45nQhOseYtjPy+1tI

7HJliwBTxkEzH+Gnc022dLIhrBRkgvy5re+FY+I7nZ9nTdvLsK8her1cfT3/DIfmDWK2LtkEunXIJzuf

FcGYPbycHV7O4UmvoQeWvaZ6DUU5EedxDVyKHGI4Ha
9nf0PPY3bL5aSQd0wLSrbNqC5UYKWwaLofLT9GiQFCgpFzrw9Ft5xuO+5xekfOgy4bxOy5Rb0tAOgZM4

bzvBwVtkjpGtw1tYn3f8iTcR0DeD1lAOUSzjMr8bf0Nw5s2S7WbLbU3QND74xL3GAG/AgII34Fv3+0g0

ez+waI9PkA8fj7h+BAQ0dxHw56/KyzkTh9K3o6VWcF
xL9U7UpIFTwm7Oy51GJaTdqvrTIIcKFWFTR6WVTx1x2UDudBlPp51j7JArErdmTbWczmPcSKCekUWqnX

Jp6aV/sB22GE1t0hHwxpMq7mX3VYR4Q7Xm+r+kWNMJxh89hf80uD7069vA/jqFdkPPGuhDD85xUZ6aOO

PLfA2kvTSldEAIev4LkEKj9alDfbXZFUqsuTtHOEFP
QnExf+zNXFYpaHJGt8lKFhHLPUysh+CTKmjqV8xzilwBqN30twn1L0hlli5k5NOJ8HI5+fxcyiaIgNDk

+pO0oWdCAWATHSz3Brnsa4ozGQ3poG2NOV2+uQNEfU+77Zu8A+TttTL6/pwhz+UlnksUn92riwK8KpmF

0jzDFrW/8sNudF7GoF/Ao33wO5Hpk2andZOEV9ccJr
Zr8UT+tX2NzAC9bH2i7rIhA4B3h3cVzqxQF5dyMge7O+7DpW7x8EJl9Flz+iBR73gWJ7C3Nel/uN0a0U

7e+sv+G88nAKf72wSNkjMpC6c4P9xREeuaJ7SwOX3NovAApm28+UaTGywk3lz4pP++f6M4FC7ZXiA2d/

ZVsmt9GiSpG9lrEDKj+F2uIVSJr2M/OQLFlMC1AAMn
uPfF7KgVMOW5w36aYDANhpJ3w3EK73ZPM9Lh6q2/JINGQgCLPPGz00zsXlCyoo9TBsuUD2GwXSGrw+qn

Nj7HkyPAwiv0eV2sWs8L91wS+iSgX1t3d+3uUNu+CY4pzTJRj86HnFCVZaQhZY5+QEKk2MIEJm9eudLU

fsdVXG6LQVfod0i6Fxf6wK8LkmcH9/UsOAmrf8kaxU
wGLlJrsMt2vP+ep9q7Xtznz14iztj9rxc3Fss2sn+3Nd+rmCSrQImz1bOeC52j3lwWzbE3hoj6lVLEMa

aYqsjb/aJPe7c3KPf2QpkTyu0JtRsliBCPlsyIFUVY/a/90lUwnma0ByiHAP2t3eS20mmPgvKdcSznfy

8lzaBtHe5XIbqp3S+waDdgw3y4klAp4ffvS3j1x/FO
mM8+Khl2iubskbOc43xfYjQ7+I7pC5wIhP/SdlXM++OEf05/AoJ6g3PedxtbxHPUdMgG7IlxmtbcPyUY

Jhu6f9MOZg+GMTyZgvsXD8LUuTcDRbN+LRRfILCBG34PpnuPCTFdEM1dgM6DIj4tGrjWEC8RVK/wuijn

0WvrP8gkzSqYDKhzNeg5WK7EgPwwd96VIa4W6JoftB
6D5juu+XvGIgQiVfY8C6iKsuyDJRrvdIpI/2mfpOExs2K8vSGQA7q6WZFYdi1TztSfL6H4UOUifYa8L4

RrAqrtEwZURR4oA7QyGajzLtvs3uuOY04OO7+aMlLaP0aEo+oO+Ul5pjQbXxJ0H/Xva4Cg2jatYvKhe8

82oxBuUH5aWzgzPdIUKJFI/p/uMNSafR5qjy0jWbIW
IsaCpfqV0GSOtR6mt+S0jYgSQzz/Mx9xYHuTlfBNedft+s88j/o+m6NYTGPPIxgMMZo5Vh2TKipOZkgJ

hSUmwAJtkY8qFneRLdO2GI2iOKuuMXRkLo4c2jwDlSFhqVlrYD/bOi/bNcgmbVhwfN/qEqaEGKci3GFx

EUSA6hUOPDcMqiMgDZJaUYBzphb5XEgeHBrxFoiKT9
RPm59TA7cyK/IrUvHO2CMIKkdrVQmlW+lqeRcEKIk4cqmQ/3B5aSv03qunUweCu5l8r0hjxj3if8tUV9

54YpEJovpE/DVRb6MOlddc9hsF/sJt71ck2BwupD5e7gGPHEu25GT+Ug88RgWXKHIXhsshMsawegeWQS

EA//mz8dto+q3VSWbU8egJQS4GAyLsQoJhKK0NvNPk
j2vLkYrpsGETXOllfwBDb5hQ2NhiD525DLfvNL8A0/t96z70XDB7slefaS8s2f9xe42Feh71mWWOz81T

0ccTrCLEew8tNqqA3t4Jl5eTkp55x49e0c/VJoHsqH2R3xy5U3s3AZJA5ceZPA9HYepilbM59fY284yC

9G9nDlrqeRvOl20t8be5dA0loB5b5A0YJZwCTjtRV4
O0gCFug6FCr3a9KjJskUkiaTkbEg5HTY/uH3fN2rrEyxa/N2Zpit9XMhCfZcI76FcIB+jZvH1IMqi3SC

zVLooMpgtJ5e3+AYWxvHcHaxDBZdenaCfOigSJr0jOpsH1sGwDHR2UIOVe6G0DBvgePa/sJjNGvKhpa2

tmRhoNRKBFBNiwWiuuA7ABecpXSJWiFh5/TBA5AgTc
oJ74osu7CqNOC/00TwRY9VI2srm6frQHIuK8J/TfgPidJD5wcwFu50Plbp/ueR6GzontXIOdDVOIzCkI

mJkV8OMm3pIn2Ik6bHMjD5GP8V5rPCkd7QHvlUyeYY3P9Z4NuvMISetXviJBLGK3oCycYYPIPWgAHu06

p917z3Ph0oG7S6jLMyohNvekQypMOXODOr21sG1+lD
1fxA10IDs6GM4Nesy4xTw02ccmbRcL4pOTqhx41vw0k0Qy/gPCG1N9vE0s3ul17xmCZTqi8mHV+hcyLH

nl3lcgcmz+NUPH6hd0NcS8tz3L3wtWI1x/kBZpzt8swFMgLpKJtPyc4q55eJbrfHgYNqzPt6olBXl7yi

cuQ9PnNnncmO8rIyroLd24sEIb0dvjeE7NOO1YRB3Z
00pAdm8e8fSHvxKpgWap534yrzFMSojkMjzQCC+HQk1L4cAgPhs6HBK2qzdsHfWVBWtAVL8itbC+I9YA

q3rqszI0LFupZ7GbjNFptzec4gOSYA7nx4p+UR4T+dXtlsGpSvq9NUMR97MCJcryc4EeQyOvU5NFvkmD

K8Dv64FWY0+d2v8ogfmDpZkSCqUa4lnRYHGQ5KldHh
Hu2skCrBCdB0ovPEy9knpcrbWfEou5JAoP7qddGtuIuiIWDyjabtq1QJiVKyNtCC5bROe7mtArSUUW6C

pr18LZAn7oN9KESoncXRiOCxA/Y54BXFfbww9epqmGM56F0rt6npjiIV1ZjvNdyUPnQi8RlFrCQTdDXc

ZsP9TCZUvN9RGoqGrjyL5wIXu327er3uUA0up9yXip
19HHC2yceRe4xX5HEyiPYOITZFI9KHTssiblyt4d9F22Ucj7h8zFul4FKHLkZaTMTM7nKjprW5c0abDS

w0MO79Vml5F24FQGJquXiwXT9SzCrX2rpy6Nm5i+O2H9WGb7bkyv8LzPJh0YKyXVDbHoTV3rMea30L3W

1KnyqnrONv9egyg5ny7WbPobKiHRSTusdQIKvrtW5c
vB1aAI4ibNyR67e3+eLt5LT60CHx1W7A0qJye26WX5cxNQkQJ05t477dIWysxs5nAQ4T1z5OIWLRyIbx

fqGIQyl7zVuYBwZEqQIVw4b/rMyit6xnike9xl9e441QhDhyx2+sDZ8TnrJhunnPGKgbNUzqzpvjDVoN

APgwvMlYyWK9CcXZCRloC0Iw+BmreFEJi7nUTg294G
z7gkBx8yGoytcCGjXh9udcjDY18+JUAN//8SUGMq5SovlAsACb3jUiAOJqseC/RfuNz5tbEhyb9XJtrHc

mkM06jQKlyOR7wCT1SoEX1I0WnMvGkeClSC/zzHbHRcaRoqv6aEn5MwWnkquXKwFowSaGC0pQNNXI/qw

wy1Ci8GsfAFmdTjRtSv3z/A75NiPlnc1lW5nMj4dfr
yfVvBdPmEh6ZRSi+7GyenohQUHoGwr0h9XYzEVF5QpurVbx68rVnEm4lwaqXFnb7lA7jwjbfAsSnVfkp

daDFUjEs3RTfzEckhNC70AsJu5LZfItYN+6cYR0I0jtL6VOEOXn/fj3WofELyiA271bzhGi8MY6hDm4l

3iO5fSn2Df+D08+ogu5524P1BuVT2DiwkqxKzuJzNZ
oubgh5djGnWieg57L1m2d2ywTR9lAIbE53fOzdGL5xJNssu1fW96tzQv4YwaCn+fcP6bpVoiTlqVzopo

fD/Ib1m9nBA49pxMpl8ToyA8wOrT4RmLiX26q620DgPr1B/k4U2wp2uel94jN9Ddj57IaKirjAZFtLiR

XACsixZFX7CSm/Zz2i/iQy0xFyGGVF6w1kQdqyknTo
ZSfo7Deo7c4DDDyjqGgRCQbsPj+sDIdtgWEJ1Id8Qo0aLSI6u7I096BiI25BS2tK0OQ5Yi6h5bXlCvHD

ntUD9R130vVe38tApwTN1Hy0/f8XV7m8087stJAJJXl79loj5OfnXBp6E/H4lZ3zU7Cf70F61pnmogQn

xb5I3Ja5XslbmgoQkayUwtm02HWZYzzYV2aZvhjRY8
nlzkZinDQNDVEYaJtZk7YkvFpvSGHvL+X8eg1WstXI6HSHdW6y6m1M7UrhC/t8OvfIpKb+nxy3NRHHFc

qNGzHCL52RvujwZE1a8PVihXaG0cizao3xiolk6jD1tf+b2cUAcoPGvX9GWiBwTZA7DBAoQaRjpLbRl5

DKygGq5lItEBxASyXgoVzpaj/akfwtVb0wKp1LjSwN
W4qVlQjeYED/SpOkApC9mo2Nb3hXvngWZlpMYOKxxrBiRfXagsBG8cqymrXy88lS/TBq0Z56aa0Z73K3

xFewgRJsr1mw99Q72Le9A1aVEhdyIcbLfmWxr+S0BoA8MNk+rqrPQ29MGcUU44lPQ7F1Nw60eO4uY6Gm

obE8QWL10aHPjmJ/JzTs4QKVAkL3LvR++LQ8mU5fPJ
o3xwmb7Ii5GWImaDpcThFYNREYp2LRezkWETaCJxTifu77nFBu6OymKA/edKzx3OuCq/uFQOyt+m7V9R

dUjA81byD3VMNJ7ju2tpPyB+XovEltSzOQLyUqBGJP2A0HJ1KpDvs5WCzSl2RYyNeKpUN5U1/UixpoVK

4wpQiI1eQefuZ6RHSq4i5J7LZG81+2J6c4hJ1cRn+e
wa6QVZuWc87xh0iw5e1QMu3PN2CFeOTdE0Urp/jvx20L/P4A5LE89P05DWr8lU45JaBEcvtn9abuq+B7

2cGds1OlKdBgPVLKWWjocpwAmKPdsX9iQTNoI73OGaDbY4gG0ebVtWsBePh4Td1BpO0/imjYKi2SUtGo

wMa8jqr7ZVVVAu6Zh+W2m1IGre/KsYke0FZaDEhY1i
H6KZ/UfdD+T5aw03+Fegb5maWGdKZXckh8cVM3lBOw4kWyQ0SNYh0qHSImnOVua0wGp1Ju8RMq60DTiR

ndCcn6Gqx2/9ItKhuznU9Ldfchlge05wUo4WjYr2i9sj9n+ZCVX8wH9ZaV5T3W414xpb3SRTNcXCseCY

cJE7YtATBssvdWufk+yjfNeH0dmN8XXJQZHoP+tTzJ
5mCujigkdCr9wSU8tRkLOm96c3qVgcGC2pGxt2Akauija7YgpzVWA7RvB46WVywkcaZw4DikLd7uGYYP

F1EBm7OCj57cZNau0Njd/19ko6byzZpbVTfXI9tg6nnn2lrpPQIqTEq01ZsVNrynDTsdp7skDdqonbqQ

H/iouDTXvfz47SyENXhAx69bxDpw8tJL4QqQqeKbW7
BVtX0ZySsx9hHN8JsriA0VsfwDugvP2O3GuB2f4S/hdjEKBjjxCiPC7uNt7n4xyXHiUa3fQSj5Po1eJ+

IgaAfPqI8g11PQvvBFkGG1SjfDbyDz6+G88dIjeBGPtuO0ToFX26cPxxwuajqkwgBpHAGaq7JzEoiGdj

sNI0DDc9tRpObK+p0dhyFVlAcZ9eY/NiD2u9sp9EBI
RvU9uRXytswjDnG/rQaeNaLclLGV1Ip7g3raLXlNWVAOIeND5e5PxrmUwnuwbu9WXPFTuXPGdzy0qHCJ

XCjha6Tfd+3tba6U9Zei86uzm+1po8+T3mJ5IeXZkQpnydzECv+ihaAoYxiVG7/9vEDcva/m0y90jT2x

3tYEXX+wvJHfv3htvD2ut5AGO8a7w4D2WfduyIeVip
1nwsQGBZ5FB9MqYBioemjuoaupcucXwDnMni0NVY88n45rujLTtnJ85j7hKyu0mEs/vFcyjhvizULaSo

Kv9RkmtakMKBzys3cSpOhdnJ/jt3wehbLdJuSTnW8qfO4/UMrvr11Tpio/wR8cYS8BIJPYaECh48R4W1

1xwY1jKQIAlv9kQs+lN8pY+Kd/xb5r6EtcXA1mQrHt
BUF92zzyImvo231ph+S8CsESe9Y1Jcv1voUK60Eurc/IWqCaNg4IF3MJ7ZTlxc3nRQ4BaPy3wn7eKUSm

xBOTMh6wz2DnRsctAZIPEmxc8DHGqtGbmg7oFBb6fBwF/5t9Jedw5NTLaNjk+tN5JsoPhLBiL7TUoDgZ

4TvYfBf/2XKHTEFw6fNZ/7+hXc/hF3cZhj1uTroBcB
Zatj6fvpqFjMS2exeYw+4DHNMGDllfOs9H4ltZpBkwux1oojHtm+3APwxUlTupeiVS4GWau65r2MrF/w

lr2PozKPTW6J3ft2zgPe8pkH4kvV2T+GyigC6EPdMOPlQ/EG46XoskJqbfkpu9WkC46hnen5Kk73+8Y8

Gu2uwwnQBujtNp1hCeju5ucGwKqH3Hu7Ys2pQ4oPT0
mjS3aZSQGF+e0/xeILkM0EOwb0fY1Y/4b8v61m0F80Ojz/zAeojb8MDVia64PcaAslfX3gfjWou6dT/v

KDUtG7RHIL5QOqrqaNuaclFiVlvYbMmQWTMIX4Hn//m6+Cfoj1WKRksDt/EEE/FgcReopKK1EaPvAyX1

zd0oDX8oWzBDA0q193jericLnpBbEHG58HavMC3/cy
Kvc0+a/jFMfkQzsV+lr+zR2LVZKv0TzwTnFtsiMFFsHYel5Dk/RhIVh2OHPBs0BBeD7QhJhYYPHodlBt

WbPT9vzZDUYhV7N2ghV+8olWS+QqkyaxrPXBl5ainUAsSM+mD0JAAKqJAnpEN5DBsXNCEszp9jr9SmlV

gcik6RVFgBfiYCqKLK4JrtsM/Tsn0KqTNFLW21cOzY
sALGL+Uo9ufL6eOoQQWBYa8XDriEUGQf7qkReaceVj8nJ8+WjelsfFH1fu1SvD2Bjuc7MykvRlcckf2f

gJlzGt9R16a7yRxw/6yK4qES2Usc0uu1Xh/VUpFKzoRXy1kqiY0u083oBQeK8Wkqc7y61r6Z69YtF2Rk

5GYLEAgYcFM6k8arVIb2NNgAx8LHX7pzS5cvmH+h2o
iQh8SsWM8xOdI8W2QBfWQY4+MKOVEzRptpNDHKGp1q9q6N27dU3AMtlZvoaWBM7S1YxCGcEqq985absV

JaPLX+qo6YWuDI/D09AEg00NoVmbKK54HCBlCMv790hZQu8/q1asY5Pz8YN33NNYvNjzPIK+Iavqm1ra

P1MNb8XCS24wMmdzhQ+Cxp7cmBPPBNm18Cseb3TnKy
tRXGNpEA/fMZ9Za9fE9F1ptxxziwsrqQWpk6ovdVUOUgFyi1k1D7qRJVNnPrHhZFTcws63hFv0KXFJya

MAX+/9LQFaWdnSsvlT4h3+9M8xSlT8qqE9KlOaMEnQqEUpxudUubCVtL6Wg/+6duoLJ7kbZOy7ezsK+t

U2pKCLHgey6Ienqqy9/BxdJk6xxjPAbc5d5ofQmb73
MNYqgL1q4jq/lqdsDydYwA0euFTwUuVykvwIm/TU7NK1/pSN7vX7l/JKEhPTMelCRmZs8kwHeyL3MP2t

4aUFa2MHOr/qtM7DVEPeyyNfkOs0s4O8hezGh3Qth98APk+D52Uc/bq2TT9sJgthrw8ruwcj9yxgXCnp

RDxV/3AHyoaoEjhYqr5LyFGEYHcGvcTri39k9p5Rw6
cFn+NHiFUJEX/cARxiTd01V4MJFyPQe9J/Px+4/r/i/33vtntH/jP5wAQioocLkXnMXYb6kBut6kokCZ

RJM/HlaMWFCDW+VZ+cRyPdPy/F/EmByQkc545rB/f8T4hWRSyM3ej+alg4KzeU0owSTZXn3GmmH8wGMh

wTe10PBwxXswoUyXTDIZJXKuTaJejybvVqgY5u2+wS
AFIahw7YJVsAhJrIZnvK8+IzPGSSL/joGwdoazSzoL65xMNyoBYt7qPFJ3sp7H+wziH54pWksucCuup+

6KYTk1Y+/9df7CVVy2kNU2NI4TySapHg/DCQc0kT29+Y4PkAepxJoeWfDtjc4WaXKDXDSOeD3NPRAIqA

yXM/r/V3zAibkaStFeNLSNSyDuu5RXCICrpL/k5qFz
TFbAzO6/hp2CdUmO+DEp8O41C0yqNwLucZg9hA8IOS+vGg/oTSOGeYOEfx+DvSGCmD/NwUnE5cmKdrB6

A8aH5ZQad0TRgdotY/OpNxb4oBvUyMhWtzCbETAUCqctWgfRfY5Z3GINRIQm7T5VBjL6K4n2M1/tQsjN

kHU3W5NQH/v9IeWm/QHxJDcbpkYK5jBnecS8X2+WXQ
FlmC9yj4iiNFLQTJsaykA3b3dAmt/NpGDlag+UIgMYBfsfXkNXchxKQmZIXdo8u1/8pXiY4W/yv+52Do

HBX/xHdXdYVRCt5CbmoU7AkRvIsvR2YxHg+TUWpJa4VQLajccP2LiHoNro4+0oKzlFG4hJbm5StVJtJO

5Ab4TfnXuF35xaDmOyxDYqB722MqmAlF94gl03FG/+
m2ttMA9Pox9aOzZkS6hKS2OTOX+nP6tAdgRnDqguD+cfiw2O6grOTSf8kgphN7Wa+QCxYo2/B3uMI3sg

mIQxAn+nArexxn/grSBiq4GKfikHs24GX2XOqQJES9UZu4QtCKuWMQI8mzP8X/dQ20nAsO4x540u6PwD

XRs2NF/ymSEo6t7D4ja0eaLhyHiopfZWo/xbi+wTMb
4qUIlpot/+eJQhU2c5de48TNtyohGmOWqwWkcecgNqSMMshd8r0/tm0IGSLfrq26Qf1efCowx3jui9Vq

ZELa+lJy8TPf50wcOvL+1R1TU0esEOvDuF4S96oViBBARTw0Bl+YLrqS/TbzsmkshRDPmBY4x61Iduj5

BxYPe0N6/ejM72cbOYuVxqfD0aV2dN5JSehf+A0+mills
ohkHGCd8JqjZvxFdnBX1s7i8xMxo8D4P0u3PDp1ZS5sbh0NH0IARsHavGz3bkPYgMuW71sWzA2g6qDcU

CavzYPiIf3FRjnhWNZeoDK77MPU6glpS1kPB9PmMZFxHx65NqPyn45kjLwfht1iNJA77lUYg+opXaynS

maRKnlikTDQYRQAMD/Cv6K5UPtbPnklZkU+6R+Bwhh
jA4WyOSlv7AwOHdH2o0S2akiNQn56eY7ypC0KURqacDgV8Fqm+rFUSZNoSnYns3yt+vC5h3yzVkGeYr9

YqxSSYdrRwpMRXeYMx2HjPfQsHvReP8hTyN6AxwZddoouX+y0n5EFPjfSGk4F8EPDibCuHy2my2G9tq/

+1qA5pkMJF06vpLQNkKJ7lMW/2y2Bqq/VKRLFCfoxB
s/G3B3Cu54apRSOUZgk8tXXj163HOtfw+zmuFTwjqG/OFIuHdj4QFBFsIow3OSyQrAwsxKEBoIqIyCjW

VKZxPl7NFKOyZXHl7URmE00k6EM71PS+gkxXgDiqMGJUWzsNpLURe5JVKaQJBkFvtXnzPW4KHj1cSZoX

OYivDSK3LAdnWCGSqefQWzHlUmnN0dYhn0cApIFqa+
gLI97gHxvXanvobQ3AZMYKvzs5UNa9hPozNQB9R9KohveiNy9Sl8x1qgMQzYQpPK/f+WV3i5yd+zBugL

CzMm+b5ez5pu9P1DIaZmg7b9IsHeB0oneC5VtmTkEYcOH9bYGqF+8+hyLQDuj18gV1dVCWksXmQUEQHU

g5LZG+8I7iEwDsjTCVCudW/xnx33/y7f5fe2d9vGbv
rJMakV1vWJ+joL4mZ0XX0ewbCqlzn5PsbwRqNLyViHNqhS7yFP4Aj0W7NdLnDXpZp9PZazRc27HTRrll

3jRoqiReKRz2ExLe+noS0eMD38ibErLPao9jh9F4IdpW83E1r0QjQQ1Kdy36MJKjX6UTyKIMps38h7SC

Op0kVfcy/cUyeUSv++NIR2I5uIwbJ5S3iXHxQjVSEC
1UmmoUraJVsbCTkttkMGXNsCQZYgDQ/DyYw/8PYGWJgLVG1LOQfSXV4bDSaOncFlpeaNTlCo0Y97emEo

8LaHYbsvXGnZyOomwCMzg9NT7ZKEdYqQ2VQAOf3ouhbafnDTOc4nF70ywMxjdgt4AjaERO30bhyAf8Sc

YzWGt6q3JPrRx3t3tJRkTJI1l7U9fDE8nvo4thzWfb
OZSt6OnZQSV81/3aUYeEOA1em3s9Ee6TeGC/aCQBujAWXdEuseRXAUrRgb41pJQZoTbeVQvDSlZjUnVy

l72CfTsRg0llS7dG34t+w5n7u5SrZf1Cn1GkHrRdDFsdxR0EqqqsXc4SjpELAJGSFHivX5twx2sfNYHF

g6aemKCRzV8vjEa4dbZsYzC8kVeakztzsRc7Xzv4yR
haxgpOkTdT3zbTJ5cM6P17yJtQ2wtljHRCs7Xv5GIN69seoQHprs6C/RHYaK9MLkdDJjFEA0lCyYebVJ

8tPHD7JR2bsLsmGa8V2BPfQ6Eu/4sNsULKtD+BozCoReKVe2nbCusqNBmynzuogMC1T0Bcl8nC4EahJZ

vIzToyaG62OkzxjayvncRwtC0D8nZbC2uvPZ8vKCzL
ZwX4+vHODlwpFXC9BbJUD1mHD6CmDU85G/puyLwmy3hDFPkCJJBcDvm17t5eitLd71ZKKc3Z1Uv+hnrY

9FOyEqv3/NRia7kGxUZ+Y7GymxPDqobXWuJPZQEBDF1kszA70ZLNEXFhtYlEPzqzmdWNZahWnfKkQHL0

JYef0pOIOn48v0U+I+IlkkIi3DVjm29bma6W5KAAXv
QvDXy61gSeBFD2UiH5PetXwPMMJkZM2+8Y7YWXqsW9dQQarBwjtEgj9Z2a2XMJNc9vuFyUhyAL9t+xtb

pN0N5O2fTWAGtseDcJFXiNz9XwckJMHY2xNqcuxQuIJL8qYvi6VJgADF2lRgQDyhDCs/tPrR9VbzDl8V

zJQAAJf7mJhmA4Fbt4r33JvRsNH3DiZDjT93YRCOFV
TmwKwbFGDlSRg5xgAHnB+CLDdQyA0zU9EZd8rkB74Z1W4lLzDSreBtPBrxC0lRERLR1IO0BJclnMPtgm

/NP28hZOkMVLn1zsJBSukwsP0qe0z9UINDgKZNe5iBN6xhcdR90xj/DYu1svPXSwZ01MDRDJzg1vrvr/

h/3c+zYyXwFk+5qrLJ+iATxPyT6jFqtBK677t2yhpG
Mi+H95AB2J8wQDibE0iZR0Z8jrAQ0Uoj0bth4zX/7E0DsYmgE7lirhagQEHVv2tjI90O3Ew/i/iQypEs

RmAJdHC4KYGo/4LABnWLGWWVboDOBBDJyx992SJgqYF5zanO9c++Q43MvwtGCs21NGKkxAmAdaL24b2S

PTZzFaQjsHc0BJbEAdMNtrY41v9AMOjIljtY6ZprwB
qwurYgdtOzFNZXB6YRFBTPAZ1QCGI6VrL2Hqm6kBtXMu8XElj3GvnsZ6GjWzorvdn/u8Jw2KnKgmoFE8

CmVOT2IaML60/B7cxs1nRo8zSkEm9e9Q/SKtm7/jdEDLitSoJhPTlnE7LE6gVlwXMfn4+t9dx72Z8rcM

yxv3E+jZtEbdZ+ggIAQW90zTNnn5gWcZM4toEkS/ZG
FQNIKbjXOS9GEOledcoCgOKB+205JMSQ85EOn+RZRU1ZSTuMVtsfRknGIU8feRBmRWG3i/8Zb97CoL7u

W7ocFkef4qHGmvgVLp4CFAXXWPT3JQrMYLmqiUx9zNNIIQtwVNTXIsiv+LHN+3S0DnP6LuDxCCsOzXyB

WykjOGgA0WbBzFoJn8y0sq4Ltn0YbGX6a0St6qgRyn
brsKATYVDi6n3Q/XUa0AZmpXr3mbVdgaI9/VHQeOhpEkmB8bGdS6TlnO2UrIh4f7Wpt1bgY1ZuvHsyFi

iK8SPnJww5FpPhikVMUU4VyO14cdgnLOCHpmJF51i1hsENcFEWYCi0fPGuS6msZl6xuDluotzAMiS54n

VjpBk7zQ9lsuS20xDiGfv6yk52nxKDd0beG1rajGu5
bKsn2WtDLx63R3GxlgmC/AcTSIU078MZKp4H2faA9dY6T+ScAMV5VUZ7TMEISfW/blCqmrn40vfxVSWC

WTvn/nqbq+r41NfH8WfpJtUxv0UzCYhHVcqCl8oxEBtc1ovLpU7r7xu+a2OhjJJq1FK+y+lCIr9BnYK7

SVWdaEck18rmlXarLcCqeeWglsTkPrDtE+NtNf1lob
idd9LfJuw/05+oPXZC8G7vnLce+PqUunRYoTckDUz+Tienh9/8iQYFzy2mVrOEi0zOb3cPwIORitOMBI

k3Y5zs7vkJVSyh914y87NbI7OXd+D3KZa+Qsg+U6E5otxl5hhWSQqkj2qnP3P+vJN3WH2P3GUzVQMD6/

Lo+TuPbe5nGWVZOk6KCy+U3OJ5QfbtnQodQTGVKaJW
BQR/mxDPmfgmZQf3Pk0PDFZvv2FKpqwf8RKvTIErIdsqRtm/TAY+32qeoG/UvgjtItlyOa2XbTkCTnl

cYt/2MorE72gwPNoXYLmyyarsIYwdyYoP/LRapvD2/AS+X0U89o1raad4/Mb0DzYUsU//+Wuoei3ZV+T

nV7GHfZk0c+tgBiLnGvY+QUNPaWVtFsvN0nrJ27416
rHrv+Og5bNnkQzVo80IdZ37Q+GlpwQ1DjwMsXgZBSRqWqjqxR4hOnixhSiuVUfuAMiKL6OT2A3p6y7sU

prsdnw0/l98NEhsGisG19sOspPAOTxzN9AWNl/m4mK8k/Ps8omMpdqw86ezTIpdqPQkEYZlXx7fTwESS

xZfmRXnyTSSvE8kCAh+ZpwNSKhcYXEzGRnhegrqIyH
SOCQfdpCENG/GonUP27jWbiDAOQ3lXYTDu/ds3rTuRKTq1JAMnixoEiKPCJwzw3hxR/wNGaYT10hHmMA

xuRK7XCxCGiSmc/wxKmwofss6qf6zgRkVbqUj1polWwG02b6i/bF0FyJKHQVP1uEUy4xGFBvqihW+LxQ

jVGdqh/6hEkciVyR0D0N71jnBmcKbhGvRTYyeioTKl
fF530lBMBZmbYbTRvbu0a5/XRStojPJMObQCcI3TweCd5OGFgwOyiLpx6yIZ7nsZN1vZbMVc5VjmIdf/

CW/qWDj0CLNt4GxJ9Zc3/UyFQqw77iGTttT/oZ+XQev6tXAzEzUNe2lIgpxHjStnx1DML7CtQUfyKleT

7ejpqt9LOb+8LuVfKVbcqLDZQxVdMcokxaLkQoiauz
WqnXhYgvaq05pnkato5WBRwrcErFr8VojLX/pUrPOr1M1TSO6yBhQEkMpscLYGvynd3mViH0dFe8a3HP

Xmh/ACOaCaNwe2MYdmGS077I3vs9O1R12Wh3RSBdAcKdIV8wxEUtaSyCOqtQ9fIWPqOT0P6ImKbGljMr

meCp6bfoqEQzUcYHKCl35f7ZDqNbykJ8wcxj2+RtFe
lc15ZHSAfQkJCQAW++FVAG5xLGvZVeTcjO2mVPMRE+cMICGG3ydTSph/og3yLeBFqP9h1qKiW3Hk0/42

A+ipXA+q1VXAVg+m/XV/B0LT/Fkkphuy3jryfJa8BrCp6apZ+42m2wLmyLvskJhjKjQ9SHPyGytXuwTL

9vfiwNR39eHwpRxraVjp4WDn5+5HwemO9IHoov0zfE
X7f80p/bXap/guvGpzhJKxB7NBWVnzu6w+NllTHR3Kf7tF0+ZiwYLqQZ92dcOcSuJ88aW+s3WQz2/Cxc

AIIuy41VV4UeqfcS7K1toO8AtT6nMY/rk3JblK089bpfGsT1jv2qJcNJJSz+cGHP3G3BNQN9frmBECve

L81yIlr3IJyxj2DVQz6Iur1sJGqqS9nJrrskW+bd8b
DNB0/MdydWzlBTuGb4F0YdGDmMpJNx44/eCBtF1hStKqK5LCOqQiFl0Iydr5JSSHQ6/20wwikM6MhLUC

AsPcMK08HrwpsQ8u6IfR6fOxb4X0xGgLh/pJ0QMiimK9UcCO7zR7PSLQ+a6+U8U4qUNlmfufZgLoEkNe

6AjnYQ5gGglyJYqVQluXxtWk4llItmwDhx1OxXUl13
+zY+1Ixjb6Jvpjsj5Jh1WEry68Q7Z1wc0q/rwdOXhxSVZ3/WWh+3qleRCn4SdmCgy6uqFgHTJ7tVtsu+

KUr1ZSKGn1bVCYpZbBDpmBN/iekQXPFbdMsjkpsX6PtFAEyr11lIctjZNQaQ4BZRM6TIar0u8+ShaAcz

dPl8+LCz0cER8x2eX+PZD2D5EOC8MAzauXsGcw3fz0
qAb/r9iskLFq2+y23PWzBGlVs8YFGepbSSV9kQhbS+AG3d27qYx0PHm90g+tUgaPUSTJC1bZY2Jgeggc

KSSyshXYCC0gJS82MUKnRQ660NdZXDzc/5nlXZ/Voa8P84DHeyoCGDdlJf4yD/ksNq6L5qV9bL5IXQcH

k1yaq9IJp0aFyJJKC1zxLwRy21Hmq9zLPE6VZJvp7x
69EBJfWeLl9xFTsp8LVD2b05I8hjJm+LJ49i41tajxT9NArrpA/I6eUHfutzmpocG49o0Wcw26JsTW/Q

EKE4lUfqvFy3ew2Nier7lIsGi1sqM0HSyYlt6F4AZtTg3AcpMeQnoKDsHQZc5L4u0cRNYqjvy3ZwUhve

c7GIIo55FfP8uBe1JjfY72IVLBliicPzBX0WnJ7990
uzlZrXyH7tX+vDEhasxYRyaIxx4jNd3rT6283WHxAUAW6wpTFx8tD2Ko03UiyM28BI6p/0XiOoVYzrAd

qTaiSGEuiVIyKsd11uHOvAgc6p0P3TUFv+wP2Ne0ue26L3u0pwNuSc21wN5nt0suK93aOCB1Kfu15JNQ

4rdnt9S3tXP01hKVVs5CFFDHhw4pK3P9LCeNS169sp
23tZ4rHBVuMqnCqGZoivoadhojCpN5uq6Vo0AIA/r1u0/tzwcf/1NmcSelEGtrpcq0kipyo8+Cft9aO4

w3kwtw9SJw0nzL/900m2HnxyyjdYe/AS12VizZ/ksx6i1uSWagGmA9FabDGNd8Jpv/FpLveiZuH8LwAx

wPfsnb3+3BlFJrh9cc8Bswe5g8LC2CNzz1qIdz1qhE
b9vmnM06VMP4lTVxUb8hdSeXKM0malY1oqTOd1SeyqPpdRutxQd9sK7uRJ9PPe5y4P/zxFTpXsORuziD

7LEf9+BnkjH7zracSXarCq0vIS62xvqxBuVLQCJQf+3lJJ9C4h7euyRmvleDBozTdNSFuvLpACfknnwS

vgt0F82KXEpV1E3xBx6+yb/hgfySK6u98l3oe42I4e
UuNEoK4vIUTJLPvUdprAPfl05xLdTV9XdNszrsNbCSC1HB4TRmp4ES/lsmKtEOBEq1M01/K7xBqupC9o

G5OikuV9IXaIgUXs+gij3Rx62Eg27dlE6NVcSh1tJe49f/Y1nluYSawJNzWWra/09BVOBtO91+dbvPUT

8Ipl9NWirtbNNeQX2TyxHU8Ue9p/3Vtl5Tak60rPzz
bcirP6ckAuHAerMMKXAUX0UfFSd9+yrGBB8BsBoM5u8ljajw7hIGlsynhP0Hrie70y4P83p5BLDj1BkC

6/RPoi3jEJ6iMLZSNKqYRQ0uqmCDol+YtJFlvQPpPlzgXjIIqijTXrLr8hUSS4yiMhxNqrmxdhXkHGsY

lJEc8O0qAwmG0KHbYvByNNet8Du3nO7wQOQ2lnd7Ic
o3Xj7gM5u4OMyk0Sua2ORDlU9/B1zgMg+oASzIYSGG6L7XNnwVLSavrc6V0xU+1v/s4TFeOHx+SuVpmD

C5yQp3vxArq3sC59svx6YyyDDZgDgNmKuVYrGEqJAmDW3wGgOvDxM9/hKAxojWOkMIzx3AfypGO9mGGy

JW7vj1ZBxygbFwWg36GrZdsgV6hmKERl5MwhZSot2r
RS4XmB7TJGNtCXvcs98yBOUYxNbaQ/v8UxM14iVtZZCvqKkCgDL5COXuq0sDAHqj0UjKE9zqVakG0KFl

BIeItmLdGSY1WXErNyFkZt4BGQA3JlPmZTwy7pHy7sZyP5/OFNbfMGqOin6/trl41K5qYLip+sx2/rWC

837gCXgmWQHjO+nQ9HujkGETT/TP9nzXBxQnGuCGXp
9HzwPqVwQyeEIuXZLvC4w+TVEX4trm3GQYivgg6T/tzqbk32hYttJVntAZaO1h/DBhFWVEanN8kK3SzY

7iNC8BN+fahbYFezsttQ+ftmivQhdeAodnU7C4iPnHe/HERlT2hueD+++OB5EG/+yYP3cVLEE7XXseUD

Qm2shHT/A47H6A0xlUwGJLQBiGZ7lmh2b17NjcoRQW
DKFOkHwKHXoz4VL4rWf8dQGT5Y+D53jTLpk+V3e2Q4WF/egoZjDQl5516YKO8CPSTF9sBItGihw22GIf

B7i91SPwNoCgHJ8WwcRSQA67XjUgLdY+qTa/VK0fE8J4epwsS8gb8DMJosPzc/88vaoADdAUx/a4Ytv5

fYLZMzd3USayVvl7z0fETMPL9MN0m1/GxY2Ou3Vhvv
tZT06HxddoXqqZR0sziqedLTUljb7JAdvkKjQcMfqMeb9OCghmEXHEk28a2su1ZnWa0yeBX3jFxHhCKw

xOaAhOOKRyraXyWyIufoDFYoBMjy+tNE5kVdbBWyh7b8bQxnv2A+UpitUFH/jYeppodltuIcWeCOp14e

xiXJ7HE7UyiaI9HT6P86ttbLLtzAGThVJzDbkLCy1r
1PcC69WTQtNSY6qr7rcQBqC3jpS8p84emxx8TH0fIM4X13Y9xz3Yt4iMvoigNs75Y6UpTlIcRw/w0py2

E/mFnI0sbImzQy7irzDcde3/QTSaMhvksBXWBGodKlihiZtChx98EJ3QIw7hSdM2nUpkqIFwl7jz8G7W

TY59M67onAHniWNWtrhiS8BeCeIpE/qHxF9TgjNgcW
rn7py7yckgq6TXU8ZpWKg3J8x55+PNwm51Ii3erdG0wDuCGOaBons5VwuBQ6wN8+VTcCL1behJg2yCvT

c/WB7c56QPmOVJvMvDngR17xlPd+xX7FTdI1Ier0rQ+gZ1R/ZWRWPWLkAU914qpw3le0gEV2RLYotZK4

KS3f1SpT8cIB30S6G9Ue7uJ65BsNn9xtsc3TK8/3SC
CBfvQACD/C1oR96xY1Gu3k14ENib211C/VRikxguBst3wC65u8uBonYMwGJnPn9BWiMiisghbMJ2EH+C

81vKOtxL/8gO9sZShyxH1Apm6bwk3IaWDCJqIBCMUOcEkeF5fLjqcWDT7aHHt4q5GKfpm059e+7cV6Tc

8R3zSH7Kbx/9/ectwUM8hkFVZOF3cmF5eajmIoLxIk
tIgorEWT+ED7u35N7UIOLq81O+y+ARkhMrsydD8FpEpuKp1wsE5mQlZur+gCacd7sGkVpbzY1ChwmZAl

W2ZnvyIpgcteVWbp6HD90zCcBvW2qHlyCx0G9Oq+MCErpzAnkz0VWMd8RRvXsHfoy7DQ/Nme3g3mI+PG

qeWiOLsUlb3WMOpZcdzzVY3wDxKGmwSPEvhYzxpFtT
JAD6Su4B1fOYsjfWchh9cBpMH9u4D89+kMtF5xkWzi8fh1wLlDgNsMeapVl1t0SvN8ldSj/qbLBx/81y

pF50m8dUnVjUkd1na25d3VelGUtpwdTI0bWGusEp/m1IEojoz5QlAu9wdTQTiZux2RJpdKzSBIHLWTdu

3Qbjaqv5tt8C4g/NdUsyPldsrfKpc7eOM1doAqAB8Z
sfKcZJBAWOGSRScDQtYGCkabrt3o96iUmi8LKHIXudePML5YXmShmYXu/noIh1n4jOVvx3eSLeFX6+9l

n9KEpx2a3N6xdyWTwCYA50tRL76gx1c+keciiquQvO5usY0VqC7XiCNsS3BW+WTrVtvcnl3RAB6jRJMf

2cn83JrplmPH4+WDDeryt8JyPP35uHSMboLTsyom6m
ArJKPt4qsqSNrqNznL0Zrsis8S9tnP/NZl+eaG/FD0O0zjoSebGIFPPu9HUqc3Puzh4Leca5A7fcOiTi

Iyk0WRmU8eI4LzZ4ABA6JyiJmhq2ggwOQ1x5JfOGP+9d/i56immfj8+uQh+jgyCD8PirVnM5zONaRpoN

8vsc07iKfcbOeQas0NDNZkNkkR4Wms3CkkY9QtB6a6
EqNhlQ6xWVUb3Q3tzddvFbDy73fLGBgw1bf14DMkSe5sVXC/REryytQLh+lG9QPef1Lv6Foc2jswW74+

I2Y5PWSpOxyBjAc56VipZRYBLhkwdMBW4eiQ2CHkFkdfwMkJH2urc/zJPLBR8/7zB0RH22U7d0d39M9H

KQarJN+Vkdy60IMZ7ce0aMixmHumHwmUc/gzTqTSVz
vF5fEmvchpBODtJJZq1CXrnotI7Rkp0tumEtbIDY7ge0G38V4gIi2tgqQmAGe9sj4nAPUOZ8DtLSiEju

DEH0Oa1Kv8t31hEALi/uFfI77ewI8G00LA1qDecSCUzMDnRVKiDRks4EB67Qu7e9d6y4HSWvN3uEeVl7

3v5kdwtJ8zulNW/7KOOGjjWqqqeFzDmYMQ3CVfJp26
skIKVV+rE09m/OSrXjuJJySrSYVatVELjHYgR5SckhMR9sVlvkOr2vhhcZlWo09A4Bb+udZu0snqSpK6

uVNSJhY7w/K8UIlsq0/cGX3ZYqct4xle3g5om6x6To029beYYcXEbAdPgDW9x9en1AbvgobO1aaQU79L

H4icrePXv7i5N5WXhQ/k1ICXiuVaEqPiN8yHfALFYA
WLdNnYVeLwlT/J1qmKp/6dSq71Suxk/SzMo2UIw3KNrVKeS+LzysFmtSodMgAFDvaJjPTjgqB47504uS

4xORvMhTknXsn6No9d/vkquG46O5Bm9AGUQlbffIZD0d8SPke7bC/Ycw04aDJqWwiHdaEr2JW+kIgj4T

g46Dqn7ZC7ci4AvvNUDBG6DvyXEMGKE7/A7mtRYqpF
diTofi1Z/B5tdI2vl5icf8lWiFz1Hg13TtbUQ5B8vdMJJB7cqRyxuQqpC2+0jY4Q5m9tOZntbZwyWBH/

0R/EwkkJJWS8RvjGc3x8nMZtHF1GlBX7KCiJAWOCVkcMn2oL8HLWkhR9ZAhW0p6cG/2Aob68tHlaILOg

AZ2fQnuCWZvctPPKOhHfhsUrN62N8ikEyXC2In98lz
v/tSpivbN7zQ5tPQKuTlDst9AkYIkMeWaxzt8mzrSPS4s/CMQS0Bc0d1Tq02KTJp75i7Bc3q53d2TZny

Erlatx4izSYrfrpYzWWMtXcd0aPdMf3lx7Migywyqhle5bOCaDVKY+zJskqdyCM5YItE14b4RIdOtCxW

Nmxvj9eGn8IBvNL5pdRkmzsOCcn2hJyRgg0jep9cD7
Ht/MQA1xiqdbWzlJkrZ5YlJ2sYkLMA9XmFQE9Q66HPxxa6kwiYnUNg+aBfMRubY4UPXAATXP1lqbhzfq

ewXtfKwYVYj4IHejxiP++e5H/cRRc8YZmLq/gMBz+hgbRR5zgX9u4rk1qNL6rs+iN1zF62KPKpEi6Rj/

HgYhfBXcKKgP2onec4ZYvHXfBQNWwSquEs4U3wxTHn
+Id/2Ado+x/z4r0pRki+G6uTLqdVUByXNor30ybqf6oUuMYiiqB5H8uXc7ub6oN41KxXALJvsXEUabvW

bvqlvWJmBzipbs/kvflLUR5jrXTrdrEKgs+wL7TwGOKRC9QMbnxrzSi6I84WRQK4x7nI6jxRPUjYg4je

P1vgVWiIZFY5r7zn700ExENeYq58xyTRmSU7P8uaLD
bQra52Hle4EFQdGO/0zlibK0RTS/m1IVHXuM7uRdNn11NTNffNVFdRPGXThh60By7RO3MiqFPlNopk2F

Ce72DXOTT0rOTN9no30MkSgn8+775pv/ijlyC1j/e69n/crMU+kEPurW1GdAjxpFo3ixb6lvCAYS5Gwx

mBv0X6i+LsbHjswnpmSm1lU6DuRUp0wdx7RtNmWf37
V34S+3dQM5CARp6+a8b9jBeu+V7JnRHbOLJ87pmgw7FGRgWHZ+mc1U5RF6RU4UD/k/trIXtD0JV0oGly

2avMCmDV0HduRXGun3YAQfJ+dpSAqLrmJeMl5M22R75V9ecvpsLbt1zA+g7zwrSP7sAHsGIV0IRd5/Fp

mSlFXNrzgArJeJGYO3Sr+WhCwbCrb8JYgHnFYkAGPs
n1SiiqkXskTP68AlIA4Jedlu78MYdGSysJv5knXzpuORb8ifD4wM6IjveQxNnKgezL5NCK/OjkAOmrsp

4iz36T3jREPyvSW49BeFTeNvMPZxZAG33SvmabBbuT9nRgzuZvnpLvZzV/XKqTobjIkshRTb6elwUUtD

v/T6Z4hCwpUs0YcoUNLXb4dYj/qKOko9er6vnHrgyX
uVhk18dQOW9WFIOIfUEjfXTf9srTy0FX4gvtIutB9gtsEyHY4+ImDEFOt9UMdtpFhtAuIP/u6yGIs9OV

XnH/VrhTjltm7qLFHMa8nI3dYEJxGo63eiA4n96kAZk32s9kWHMizd6oFBkPQog7q7I3UB8eDhLLgMK+

pQ4eRLfmsu1B/s77H4kPu7ibLPMYYHUPHVf+DI6c7b
wpJIJtixNwO1KWbCw/xSFyQEK+OTYME5EDQgSkxWnhQqMDZ+RW14vFk+7pBS5Pqa4Bz+rqSu0qzgO/wG

AM8ZtRgXyjBK1VSLAwyH/moZtUV6pPbPN2HxEQAa3zxYyU1f7ODG70uDNybCTIb9h3TEBJSjv9w59P7H

4dVjL/C35abA+6M8F1hN+DRpMuw3oZIBd8acIT0F+m
EO5lU6RLxGOuol4paFXbA/S6K3DaEdj6xfwEV2O5vZUCQVLHuhkEkqztE8PsMj9MXVLrVc9qCTWYZK4f

l4eq7tqrXc+jQfTtfh7oC4f004+IH/Ls8c+lvsM/r/vzdQ/d3LosTLGrLQg1XlrVKyR+ZJJhWDclioc9

/Lv8xXWpETZUh8b3J77+CibRudwCnHQtFrcgP2RVWC
aPRZD6nNtB48B3spI4P0Y1YWjhiBlWxlpCOb/W9bmbNr0fwKDm6aNY4ig7/sztZsSFhLvEvueVgeYZXo

2sr7no2oXOFpDktbomJgVpDCkBrcpmUlRgzN5fUq1vo5Cydlhrq5ZRQR1cdk9rUOaoEiV52P9lZeAFsF

f9JIzqAWbhpC/jjgK2UIvJqG0k9PyWXVjd2g2+gBBa
XEi+TgR5Az5DdzZ991D6qkoTvDbdnRVSQ17HGwf7ieY/Nrs89QP5f0oOuWNP2DwdWLKDeLYeuIRjaAeA

uSmguRQGIeQnYaiArdvq2QwA4hebxbTrkPROQzsz0kwL/FlSjUSuBKQr/TLS7Yj+TDbpdnflRt6IVHzW

9D48FcBliAQmw0xdSrGtkqUlAyr5OBNnDH3o+/Gmuq
dzl4ykyQ4R9P4Hvsuoa88yUWtJh9BEavS5VMIlMQJqxE5q/WdVyPPnfNDR1kK6EAqy8LOL/zhCMffrzK

W94mAPNBB+Fdt40W+bkRc8OiSZVCVj93Nsq941orpb79xtkmjUrx2SE7ll1rRgpxG5zZxw4r0wnG4b0E

pEqjjS/EbVIVNUggpwo4RdcZWli+7Twnyvbg2/7WbA
U3L+6Z+bQhtp6hC3QRTSWSWHtYHa3phE+yXjlzDTkjrmCetO6qoi2chBSRHwksD9iLQ++VTeyxymIbQk

o1C6gjTyDHYnvIh2UhbYU0J2q/+lhlEGteZ3n0h3PvB7+AkeyP8u+9fEs/4AS0dty1OP00BB5VFk/32B

f0DwyxPmhdRarXGA14cKYPYkIK/hevys5VtKEwwNiA
Gwn7/o6SmheH2AU8Qx36rZn5JfTqW/aHRGKLlmWtoXQPenGSsbFnGTYZauJwqchmsjhBkgqiPLBF4JmV

Hy/r7cL8rfmT2rDSW86Wfi65zE61DxrWAe2cqM0AGQzH6ptr07lE2xBH667ZFN8Gb4/Ihshh1i+yCVFm

bkvBQ2MAVh2CIZ3BHU3hyqFnQy6tn7btbeH9GBw40+
CoSBXx5BrkSA8iwfTRnXp6QautHJIoKNDJe8vaymAMVp2zNx3X636s0K1vnyOtNHpAjugk8JVjsOrDsf

3FIG8jbOuwv7LwvkR4dG0Y1WPy1esDqgf/svEqdSeBeokWBjTcc389f7QQ3xPvhm+Q319+lVnPdR1zSU

MdIuIGftcFK+r58EqvV70KPmZ315Jk4XzAivXUjyF/
Mczt9uIgN2HpNZ3fK8a7bVCnjOqcM1NA3aRkVn1xKDwJN/Zk56bWBwiDhxgb2ttyUUXUwpaMVU/4L/6z

IEGMDJQsLjCN9K832uJiwGDEe6hgZVAHVIf82DobCmgy+c12IAbDjzAR085+VqCYG6isqY8+KhULkxCQ

KBAamJplwZ+PjTMxEBf4t0rUMS7GAR3u5Kzl7gZ/M3
uH2UOxrhXdZZ6zLxVzPn+lVSXu2nMiAyCImcnpv/X/3O5l4XhWau+LvX/5VlP9iiqivVZ7Owik+Efj9+

JGpp3NekHbG5iBJfpDSVb2IixdKXiJOBJVKpp0vN7SM48EWI13m0eXZD8ws5djt9xLj45g9pBT2F2ATj

xKe0A6HB6Lxak0jglSekVt61v5zkGCq74LvVnR03TH
jJxnm3LBOv9wxNcJnliOghF6fsIWFb9+U8UxdU/m4d2FjvDGtsxTgvi69uVg7fi16UOua4qwNqudIsEg

k/8ay8LSu1lhZVb3Mw3xFOQmm6f0Hkj5cuOJMAF1Kj6meukf7IU1MbEdurUDMteBIaIARM41GQf+trZB

gFWwnVvsxiuDhbnIRFLdcmHg5axA0TdsMdvuHVasFJ
rpsneBGJWE6aI1p4aUxW/unhLjaGN4wCPEs3/6ar3Uq8dQgBOKSUXxGVYIF0yfn/NtDKgpu1lrfxBb5C

SemoQRPjlLwPd5LBg4RybujX0+1WkgGVi7wCiLoYuvdTZ46fixrT0HFm89WTudF5fI9nRQGag0x5XF0B

y6RyQ84LYa4o0hIhUD5mDq59PFDbbS0vdq2sHkSmNm
SjAcyIsIF31bxEmAguYvLl/NhBCKYupKVDRSt5uQqy90bRZDKJbR2E2LBszu+alaSqvMQknP82GELD6B

dtExMIoqkBt4xNvAtCaUWle7lro+m/JKBJf/JORswdXtaBIBDXoyCZT+ITQAzAndmI5EmRxhhroHLq/6

5QniuQTIWGshZ5ebEBEnvIdQb2l++pXfEjgswiPB0Q
7yGcnwQzlXOm278ZmlWjcijC1fhuPjO3ekaAZhanG7dp1F1ZQvwZ0/IP6XStCgYuG7qBEDs67QFfJCBx

bkGxnXm1nPxy4EU9Zkn45PLhOXl+xLcB3GNfXRy64Np0LxIwdvQSJ5C+SPEfb2YGluKGO9cZ0VySWk+c

EEePMXaOZBfL51sFni3idqINICCk+PCYnlhKd43kro
Mqzvsn6VdHqanY8Qs9bHkCpX9TUMnU0QXG9aG2VygyTBK/mzqh4jyDYFQ1+f5WLVq6kTCegz5ezZ6CmR

gDl0n1zpcz1QpRhGUadSo+QMTqa+kkYOT6NFiUcf6RVr2CIAwOdHppQ/20TIMCb/Up0DH/Y2uWJqoLsX

MFPcu5nDOiK3vNB6lHQ7Pk4vN7LBzn1cgU4X2gkCr6
zGzVp3YTbTtZREDDS0L3aKO/P+PsTvCbrb4OXwsg1pwk/fgquKGnVe1UFUN6CRqW9wYcO9AygcoYFQB6

mICMy02qmjaHNJLmZQxtWJK4Eb96otmvVWqqnPbYVZ4l1oORNV+wKPJzuz8xTz6NxxEbS61hzxbRYUJe

45Vhxd+4oza4pnQjBCyzUiwtKigEA2hHPuErwckKWr
baZCPBQFNS9RW1soOtVzKESx+wb7eW1mgGlCwYlSal1RvgJhNU0z/HB+egsw0Iyg9XzI8Is7DTsLk2Xd

l5D7/c7XVeTdz+0CjRfBYyXuxo3SmthPo3Ddc2TgfYiFfELBzfDowiO4L5w7YxHRj+a39B+GCHmKoTGJ

646e45hZT4tccnMSLUtRMk4VQzY3m4Li8ifgTJ7c9O
vQrJGrVG00Ev9m4gCidOpNEXRoDzgmivIGeD7rC1+xNE3fup3Ig6iz2ukTiq1A9VmuVt6AnqB8IMoEgV

5LpN4lBtzwOi5C1p3LiVPnAhf5SZGDimUjBNgKQKjxb46SEePWOsW13047hnoJHfnM/DLSV+yUS9gRU/

0kmObmUFc+v9HWcIFEZDgovKqN5dY+PJh7UBaAZZz2
KzvHX2rl/5R2qAmhAgNVvNB5TrOVfrfD2z03U5vJxhJgI9zUc82EtkVv89GddpcpJsRZBqDD1AQhC8nB

C2F/GW8carbtt0yC4E47wWPo8hPh9UI49TXQJb4To94H7ebkcoDpDvF+CO4x8gFlvxyis5vKU9Rer7Cu

eWEkHOyJZxjsUDn+kB4NXs4fC2QeNegts2H2LTIpmf
1R/TpChAyS8hhhSZMxHw3DAF0U3l0p3hUBeYpTeAWzhYCpO3dcZn0Z59Nx4ANOYcgyXGm/DE+Xt10G2e

WBX5r6lWDofHceca1+qy2tKjU2mlnLJfGxwhFi4+Y0+emQ4pR007/Mj14LiRkC1O3/Sj9C2lW7z8ntHc

HwZhC6F1wk3fxEBikWMFBxFMKIFyZtjVb/+ZIHmM9+
ftKBPfcXYLmNdjTRWZ497zrgfj00xfwEJClvnmfc/C6J6ViN7aMr5W+r74sAs9T9pwrdrMOHnUybJTMQ

xwiOJhJV4hD8mPp2yXyxzC9mSO7TgGpivIi5OF/CX2N/PbN0MIBMXk/qpElrIXpygQ7WNplRkF8aPLw8

Dlua2sQ0GNN7PoiPlnCxu6jGUpaF4QGI3h0Dykfo5t
nH8o8Xwbh/4sWh8jp5T392QLDGeIOh++UbQQTciGaXvtdE0gaA9Urp4/AQccNYIWVf9b5ymFU+Sv/A/l

sSFNWtWRJuUFM87e5WIBzF/TxV/9qc1B9o34H9m3SI/3pocn9Qc68kXg7aHRM/te+KWDVK95MIXiNCaF

ZQ/7tVeSbBau0G4LFxmhahU8MixS7CfJ/YAhIDyyvq
exfj1fBfUB10TZi3A0sl85eUjFQ+HzdjL8S+rI6cJmimLWDTRHJST4LC+ZobXveoakHZNG4LaCJ4upiP

v/lAnb5NlUdbr/P5N5yTtX2INY7o3z/hBWLLIfh55q3wzk5V/9db/qyL7UwcOTR+ahPoiBqg7/FV/Um4

OahF/hJnfEaa+wgv+ikcFljx1R8HVUXXLsQyJ88hmR
6OVqaPA9K1q9ww42UegiB9AYlYFB6hj/yitjk3QBLLwXaXJ0zH12PjFdlYvZoNcCxPwMF21vWMDj6LJu

xrNHslIQIQojZepz3SXHtjJr5/cBHiALUvlPgMvCDl/C3NdYv3GWm/YtElzvJBmO493736xTEm6aknBN

nb9p7rlS8MCO7yhmwkIoSq/7lgMkrgIMgTmhZvn8pW
dFNhOriCS955XF2zMdcAJXfXPhdJJxjqbQFYwwayx1QPdCm1frNKICU30SURW0K26y5xNcl8Eo0XYmt5

FkBbDKMdg+PNqmr0Ok90ydjW/09oZ3edFnHMHOdQj1NGbKMpX2qp9IHZrtMNB1kIra+UsgnRcpz15KnQ

uRTs0/rC7XguzVvdXyZz+cgMo/dqNCGDdQOT0yTbhh
C12U7CFC15hb1dtiya6Phi4We/9nuZGqg2ffWQ4Dk1xjBMcV/y/IehbFJ4OODjKiguUaTMygi6m9EgO6

DbhWo3RPxLcGm/BeDLxJjBua8IQ7esEOJwK/YVBvgVSHHxefRTXyqJZ949iPnptwMozX/pad2rBZSZ+k

8SEY9YItlMcUf5dv5pJIRC5ltdyv1IVIKR6pHVe47n
PbP5tfsMrPQzUI9etH05sr57yBEhve9m0METPCSCo/mhM1LUK/j9Rq7yBUWF4TYydKf+ewYkmXa0nTde

wK30A2+7VjXmripx842Qynuspc/4BSmsLgNjj9qmQIwVm7g1HA6Ugtii8tkYoJ/5snkGBCbvAkPEJvE6

9TFJ0EdRIndJvZwOTuDSXfX/O9R1ZCamH1VwT5rMSs
fKmJMpuzRJLXLCdqmhPReRE3OfizMJJFu9QS5GBXIFl7Oca2BT0xyVHy8RD6XNzW0NftXcPbp+Hgyiw9

AaXvaISUQtbqk826vPgvq5CxBaD7b3b1LkJ//WBH3nk2i+mUUgqlEkLdL+NeU3y9a3FZigHW6kS+JE8R

HHG6NsCvfEQNov9W7nBEttJw8/RLfw1gJvszimFW3w
LqieQX/MrsWu7MEtnSG9FSB4NrQDkPnuDFgPQ+3LdF+mills+lROQzz6ZEJAs2y0gSyPX7I8JeU5kFzkHIi

3RwZKiXbaVXnB7Pr9LaNeuSTEAN/EvKYGuv2hylfJZ4pqcwpZHQQwv3YdM2Mt90XBqA3uCpje67tp1uE

GyvB7kP7VVroTvRiAsQLdMtlo++VvRs1msxMOK5YDZ
mNAjW4YG3kM+TKfUsjCGg28yyIq9um3JE175IIB6oi1IEJ9pVQSJWWsdBR7w3CYzICWfF3EhidbKWL/l

440sR2tX68x3FalbbpWz0ZZKw017XCeRGTyw595piAlUdKRw9qYHqD58eGu8IjLNjXjPziO+cirR5nNq

n6t669mxwZUYoLEavugqmHDwQo+XnZ2Jyu4qlqorLp
0n0clbmV7jaICXvHxGkr+e6W1BSlob6oQuoyY3wOZwNmd0MG0ckoXuduL0mx5z7cZkGCmOTWZ9kvrJfr

9/d5/5efE+K90PnckLT5c+VIeoPFYW6DrgCR1OP65iB2YpZFtqPW/Dzc8HRQALLhwH91D0JAVvDvUJGb

loaQdkxqx1wcM9ozH/N111bTbktXcGOaAqghPlihvM
mqCIvzGyU20FMXKJ/x+tLvCD1rZKXtcwTsZyPjI0r87/3NBmTdMas+smNo47GEidh89TXvwTMYzu1o/8

u6Yv5BJbr2UiIMEUToR1YTY1vEVubbE/hx0LCMfOkUgdq4WGBodDUF9Zo9QLjvh1+oWF7kpovxjYTTAo

0QnpY7kb8CVqMfxmgRkiViH5E3CxUHR+0RctDZOYZF
xaG0Le9zl9d2Z60oOgjgLp1BNt9GHcZtnE6A7dpF6fSyHvjvqWZyvsb4TXWK4/x9+IAs5c4nMHBKZm/s

Pf92su+57RJ96v0+tm5Y0oe5ujXORhT5vr/9el9E8sqXS0Ax8wbZyFkWyO8g6+zPDwdfP3q/JtMIKg+b

8eR/KPosA1VcZGoXVGwcYObdgTVvP/Kx/ZPBPhb0eY
dtLEWNiCE1cEeGx3XaffOJ28gNcLOSxMNp3n63cMWhamnKOj1Np2h1DgCf2SqF7s8fkoK3hxzyUxcQh1

OD0h32+qjWtKuKIUlCUN6rpprgVWi8RmCPLB2WBAshPDRBPySCDeKDR3k609b8fT2DLTvD2p1FA05E+q

6z+vKUdh1p/F+XVhuV1d4C8KK6nChL+7b2q1/8xkXb
Dpq13kf9Zk/lq6DC6Ox78jsNp4pTmVD+a+QfQU4KlkJ2qSXWKHbPMa0uS9qp8jquSXDK79lJ/vreDRfc

isa3eiOC8XAGlislYwetyNHapCBapHGeA1axAP9O8kx4oT/JHDImXn+t8+FIiZZPI6csKU0zjbA3J4XP

EWCFYzu3nVeXfAviXtZ11ZaFA8GNZFvFHmWvcw/AO0
fFZQ1i2GqxYpGa7PHhUpGaFjFzIaMt5M+TC/xc+c/ByZe+wd+9qTuxth+pAl31XH/wgcpR6Vovk95Lfx

QgLE0Z7vU7lyCypT/8rYX63AgLVjk3dH33MsWF/a8miWC7Ue0sp7IXb45KoBp9c1UXgwxUvfR2Q8OzaY

KJKrdJvlXgGxysp3UdpClI+fBQ+9vvwiPNRus9Gmlk
6sue3DmYji+LZhaTIDrOaf5x5uMypjoEziYP2Sr/1TFbs4pF7B6LL5JSLVZAaJxojHxazCvRrr+4/IWH

g1b2ONm/4iPqvPO52XmfH9DcMktOPCt3xFrJ11A5V8grC5/+G7YvvHskG50nRH1Xtfu1JMyIhAn76B+z

82N4LqPBFCc2dCg5D/MI4YclNMc31Dpdx7+fRMaxqe
xVeQ8PJR2Sd+35Wri3Ro3WvlkZb+IJyp5/yYN+JPvkcpmihEVoOiNFj3OBsniPfAqfe8/z6DpriPmG70

xHgFT4SlZzcMiLB4sO4RUHjCYDXsJxWI6FRIbhBp/yoWOaW4Ol+HRJcco/8C3s5xBALVbO8dh05VuPz0

iDrF+m/0qhQshjl97BGO1F6qyTYX+qtIq0ZWCOl9aY
ScsvIAVfrml/1j3Dua0uvyhYuLfxK7j+13IMp01/vyrJ48eDoPXvEqlT3st9EO8cvnU3RCFp/wYAODr9

ejP558s/Sd17CMdz/gh5q03kEWWntyxwMJpjPMe03r6Pgld9tTW4vTS/7f+epV0ryIwzOyrnitFqbOqL

pvntonf1l2tuqyk0jq4GtPeSF1s+opuDJs0+eK1e1J
CnBpqSrEaVog4Ptanw0Aqqf6+SSwS7AFIdhT38v3wXXW9WhvUMfHAupkB9pIe7CiaXL+FCpNXzl1dOKf

lcnMrFyBqEsGzQh9BGxDgzoE+/D3vNJSvDYMNYWHS/iV3XiPSJTNPKvSR8nNOMyTjWSVBQ9nBZZLqKTN

RzVd9Os2LEX0ctZOA8C+N9EgYsGuiIiZ9fXk9npOJK
JPYjc7qjDxmZ1+cWcsknwN7w43CQMnfL3DNlVAeaHeUjwnmNxerzEMKENOBq70Yy5tIMnlBdfGD++

PpZxtv/6gT/AJLwwhtUi9swLctQa8OJbqpyJ2HIqi8jXk33RK74NY5jKwRntV73FwO7rPnQoTlTkrpLI

+kPH1IwiX5e8ztveS3gVxA680bXG15x0XQngsvgI/2
+evf5/6Xd8R1PHxJhZ/nus51TTwV/3Ky9Fu68PS7SSmnXSakTw3gbwthclYMtjjZ/O5q5mfYlVF7sTpt

OKq2qsxORnXw8mgnwdm1u7o+btY15CBC1u00WwqmxOg1BTJWTJiXn4rFNz24F255xCMI8ueebgS4FhGd

4uaaAn2Z/GUQ5qMddZnI3mle59re/Zl+bjb14DbJSw
f6tL/ZfxHAJlkZOcYzZ3lFkhttrLM/NKtUSqWSolb6eXositdBzlfuYxDEO3DiSfb9lvZndGyWTluWkV

TQQIYIhCRvjyhxT5751+wX+S7zT3dTHqpcAIfCEiCtLmENpyf0RaOkxP/93n1U+14PFjz1AiWCgvasnK

X69D5rtPHJSEbmdqfZYIlsKdB/Eswp6ZAlrFfcWEWB
6JMScF4e9K4gFLJFeKtQMSU25Otbbgzura4uoJiz/ZFCy17P8o0vGJwR1/4nJNl4TQs/c+1F3pEfC5dm

Duw30sSL40PbWIY03WliIlf00nhOLSoA9gKdzQ9aUARxXEfZUYj6GGYwsJgRaEbbkq9qMgZfKcgefAjs

aF2UXka+xBtJuzVEqC+teu6vnuA/cJjfZ8bbzL86xf
4LAYN31kqwB7l9hzBCrSF2LcRLjob3mkWAEInIUgjBF9lW+/ezKPs6dd88NzNObRbhk5AWoGsfJpIl6Y

PFl/9BA216fK93T1AGBEpklGcqRoaZr7ftLPiEJHu3UVPBS79WCJwlTm8XmQIlpR84Yl03OzegWUIn8j

zy30qk5Wn1kDipmBaaKylb+OqXyJUnAgd0VH0iHyff
z6dFV9w4vkg2ArCkmNvEAa/OQIHdBGyR01r8jj/p10ICFoI8U4rEQvR4a12NjLNNbCG2pQiO4KL5U+tG

TXBrgaYhJuTS7XCwaapCN3WOvtk4PL8F0pV0Qj81mkR/bGPWL1MPUHUfYFuPL2Di/+JDINhGrTCe2uCS

KuAMH2uQks03Qdm4kIO10kewb/oMGc/69zOrbCDnw7
65tSaS4tPxk/NG1KWZ8kglyQAMmw+U8UoDgKEaZdZa5u0xMJq2RwdCiRWv80Y2L3T9Tm7Ngf8hrRKS61

N+756c6MZYZ+E54plDcRxxf1zLw6Xj3eheQnWeQp2rcI7QoTm3/2w7hy+w1KYPRwKhZZ2RvtXr/51Rfb

RSDnwnt4vL+yyN1APLXONqGbe6SqhxEmf/A6F63RZB
Eeqx6totX3bwhOsHqovMF6q15qRCsy2+oKPByY4dbao5iNx0Mq/TLI8ch1A4tm+v60FeTn1mfcjmgpJv

WAq+wk8jaOvAEOqBr+Y+N2VfbLKlRBsE7MN+plUU45oc/WTgVZTEeYimUqRpwG7ob79lnoxYVaFhSpMU

C4dqTDzZpXtBKAAz7893tJ/x70CBaIzCqFJob8cIxX
0k1JIIhme4CPt+MerFUwR7GY3yRczIYWGFaGWU1tqvAW4i/TO+FuPGUR89UaYJCECSbRsh4odNxs+ox2

XO+YIcpGo5LJ8pka+8KFQZ15ryByx6+s5rjDbPLwmyAAF+DdxV5N1E5sMX1fq8f5YdI1empv2E5WSmAr

ZlbkxO+ry0DIAip8pnGl9ZpuaK9tx8NxXb636vQ5lG
3oT3Bk1nNF1v+BY2Gvyc21WsVd4Ah0dTejx2aY4NbeJmaklmxu8bRpkfoV+vf1kD+bJfUxZ8oc3xuFfp

0gs/uMED5+qRq1k0N0rgqefzM15mKPsyQEd8mIZDTX9Vw4nZ7RSBDhaRuROdlxWj/ODJsjv7vo/NVzzl

wBNJYgS2w5TQGHxoX1XnZ2YlzJHfMlbdk62be3eG43
tXshbA7J05GcnoLiRtnsokMpHwpEv8xh7p3IqDHnXkuWMs0XNmmzzdQG5dJfkHlzAVTXfTMvx9RM8ACc

bE/wGwD0vg6FLGF7m1R+JZzcRI+EbGqWiWtHthkFiilnlBXYmJY2rMtnaJtndqZlES7Iy25SLy76dbNe

Iutv+NAvCSJwul3UQlw0k80EIxG/3La5Jk9oomLYzM
O+EvWaYsEDm60kiimEHvqy7lnYgrkBndOLDUsFJHl8ztLVtiAvg7B6u8bJYe5NhlVWwbWiu6GO5cpm1g

jAIT7SIxs8irAbIpX9p4FAsqIjpc+HoXrvOhk/NKxF7n8MDmZlo7InaOyNV7909rTQJwwAd6yo7oMB+N

75+ZS/8CRA7dqkg/2QTdVeAl/fkhVoEDdKXKOVtLAp
2UxginSOA66Wj8tdPiBbV3u122Tk1FhB61zZGx9B1j9KxH2aarmTH/GYnTKd9hehg3yITx34iOHcqK4t

2FKsivNanzS0QxB+qyRAlBiC6j/SrTSo9rgVC6P5vWCgSUWUFb/SZlzV0Q/BdJEIR36sOr0DJhbKxNhm

sHLB+/JmhLAP10a1n5UeB6lv6rutFktCSmJFaBkEm7
v4rbF+ASkzIagfYVUCk9lv01vWXKhDa7yEbYL14Itb/dT16tDnlqLW3Jhkjh77pDutOpNp3yH17b0Rl1

RPeAY2vtxFze4DsMZV3DeiFpg2w7JByVNWxAtWeSA4Q1fRJ/vVifOq5E8AKvYHQ91Kq/ekoQU1KWs1Ds

nDIB5Lf05VURHFPkXHgsFATGZJu9ZHS3KD6ho+3v22
dOnXcpNxMQs6YeA6tnac5Q0yf4q4m/kIpn3UTsD0iFTftUQmfC/4d/BbATpDByS1ffxkq2bsbbcocOti

1HQex21zbdJUbZRrlFblotZTAwQdAzdYFkhPHpNIGL+6ixTkgHi/VuUWtAWqf2ojqP6BJSOYJoL+7K5r

Pl3GOIh+Qww/82qe9vsGpzvc98yNFBHpXeJ0V0RH7l
L3j6+fiSKP4kuR56JZ6/VIcVlIkbk28fD55utbMyipsTIfJP9XZn7dtXtVZWzI48D4uuDENfEjnWfsbv

fOAWCw95oZUdiEEUJJYXhJPf6hJxqKDXQ5hxlQuILIbIbuuCZuovcHgVqdZ9pB/iTXEkamQw2h4cHsun

5aVF0RxIrjfEUpCRrzk/v+EHUWbfkMyhtD9950oQsO
KFtBXp+Aln/+sgJMX2KmCoqPAru2S0QmrRwydNfX2//MF1pIcX3EC2EhCIuvEF4oIPtaChrpapKb1L3Y

7bwOu4YZBBOpDLD5HFRoGVeDCZJ/UaQUkTWZW3gryHtDd/Bjb7+yvrRiuykHZccDzoZ+omBlMJR6/ydr

KKHxkwdUcinA9SLWNrXJ2DXpMsxjOLg+jJ4RAHmDVk
y8g6ToVMt48SaC5OgiRFcVqEzVN/2sGB8/NUTMauB45c+k4tpDBBBkYt9zxrx6VxnAv7iDQ+vKYZv8/r

9SDgiJ3p+xYedAa2uU+7JAqKumvnaUZGqzo4qksnvB40G2CM44wCOvdaflTYitCLK7GoRqt1m6MsAtRG

vAWAiOB8JAEvu+2WsL9cIEomCQhBA33ihIAy+tlyJW
HsNeL/j4IMR6qIscEo9u5kuvhWBdwPObWt2BB8OoHRS4cVcomuUVW+wGYNIyU+Jd4nsqzMx7J1GeOVNv

f6di8OHwRztfOTl+QfMJVvOdNwwwIM0kP8G6GM6CNTZnnz7e4y8sfFUsls15NEWjxF6/mH7/sGIjtHXO

/3PN8OnVGN4Wdowy8ZmWnrQ64nnxK0D9EUnB1fn4Qa
zixPprO0/e/d2E5TTYTNbqPLldduiQe5n+PWMZ/RM1O+07QXSrsDIxJUPG3vOmm49jo4I0YIUqbdzqdS

fDxmJlKVe8nofdgq4sELeJye20phfGjiD5Y5yKNY+TWM/j7ZDRdBVUS0C7+ee4wM1hhbTaPvt8PeMDVk

ekZh6fKKCvGF0LJnQHC2O9F9ItzYO1+6UWiebOUCzg
rBxQxQpNstyYVudSqRD0l1TLc0LxTuU9Q8OU0OVbxflHzJSZy9bFtQoykF0mnHIlx2z0eu4zHAQ8JBZ9

kRkZHpZ5d52zc/brEpfgUsiy+SKn1zJuzJTudyTdptsd6kl33k9V8IW3ItBrwzm+qgaWAamLNMUgz1M/

xgim8zduppLlmG1PLCCIp+obXEOjFdSDQ+56ahSF0A
HneESuwJgfIVx7Hup22t84NlYvzxCPKJU/MTqr8oD55N+FcdOBEhnJ0CSKyqnsl3ittV/j4G3z0NDCz7

dr102ZUPDT+uzdh8dbnTgmdRy6Dt3jtp/kKQFGh6WhS7UMDxNfzgLYI6yHF4wBZDn9vVrnl6ufLJk0rR

Lq7cL7yExKrzUuaaHJN1z/nJnscyWnsg7GkYcMlt59
NefVt4DfFlIkHVax2re7etsd+KLH47R0ZuKOqJX8O1UQqxo7ucxh4PDqZw0oZ/4j7kliu+wTmbqNIXw8

mIoUnmlUKiB0lEN3PdY9xhzet76IX5/1P2ynaWnIf708zhuEOE8zsyNGcXkdYiLm3KOIxiwF6qmECcGg

kST27W337iZNR2pCdNfWx/1Sa9anq+fEyzmffwXuOc
sDyu4BjAIUYg+r739MVupe44elSkg3YFf6cA9vfT7l+Y9VeTUBBKiHIUK6qY1/tLmwpf/lbQY97T4AzP

x9julfrJ1MKMY14LmpP5+92zDVM+mFd8BiD0IrI4S78xLROQ0yRMHJfC1YRT4vqp8YuDTqio/5O0CeFf

ThOyCguu5lpO9+ifK6pq+VqfWMK8oy7hA904XTx5as
8G4DQKX753xJGaKN55ntd9wYNjOy542DdbgRC0tbY2HbSfUkplQkbDzm2DHF9DjYZDms81s+hQA6nv6Y

vaxKwXPGL9ZQKOa6JbU5BRrhzbcyMLLef9JOXSSO36VC0gTrW5DB8Seih96/wRHQ0Gqgh38Z9680sxmx

v8HXDir71LqDdo1s+3Tx0WXqocE16Dlt4+i5BeaXOh
vF5yLYMBc3gbnp/88suCVeBSm5vrFBtRiz0DCJ74hPJ4Mv4XJIhVEz3Sz44ie5YVwF1tQYdpQROrRPkF

G2Km0Q05W+sNLXN+CGB3Zt6ARLL3jB5evCNsR7ySb93LxxNkPmvEmoe+DrDk11Ho/TmUSkfuYXRIAdq2

v5pxoyE94AS2GX4wUQM21pnjlzGQ0PPwKqh0onbJxC
bXf8s/ZvOxz+COY90m/A0cMPcUSyJuFlapVZjc3l+8YW4iXF7NL6vmrWkzZl4w01uGmvGdrGol7WKZJ6

b1fqzN21oWEmjUbiUoq3wCfL0+FWMNeX1LnODLy/hetQVkW0dFn/oS7ECYa79/EDAWRD/15D7Cv1JGkUKvQE

uf6uEF0bLzpP49UmAs1LePfDKo9WS7L66YKU4voU43
5n2tVdi/8r8lbMkaLFgClXrnoYsDC3bcfrpbJS2jsJXIoEZH7JFAg5MX0Nag7LuJb5Nr4d8B68Sn45KC

w+Uk7mVnDMykkD+QSfPyfSNZvIsVsse7F82XDSSG67pY7OD2p7HE02vTMwLMHbABQQZ3jLgVKtr7RX9K

G5wqtuNpYKiqXwewA+XP4nu8cpbcw8Sp8NC31rxGOV
uC7eIZPm90uMgNNUyO9CCtUT4/xLtvKO2BFRpkirdqH6epq7jmozAt2LYX8xXWlmZnxOtsTTIAiFGopC

p6/oKygnMsaz2fRkQRDZdp5hsjwRDbqo8zTEsBSGG8SkrFI8ZGVl15w74NPmJg6aMFai5LO1J+7b0F2b

rDLY4th92mh9qPMVjFo1xMrY26tR0w+YJzeQc3kG15
2eGs6YDgdMa1xWJJBFDsEq1ggcOm3EpnL+nU0ieCHwH/M4mGIqtue2I0pVgzmaQ14EbvXaX4mNO0ovbU

3OGaGTUAnx/keNhxQV1k0LL+CIQ+ZJMmmf2n9FW0deJDDzcpS6gAHN9Dm7OqCMRfwBBGmI9xuZrq2R7w

s+Cx9RnR+tSKyFf7lHpT7TonEYKz9MJlTV9oiHvuRf
JVcZ71tUbjXHEa+fHHH+gppqhTTRceZ3LpBKZwKggFgGwuI9AtIpBd/5oEDpsLLi0WnWuXGMn2egLZes

uoDs6jNTihgC6ThKWjBcCt64E3SGbbzSdTGfMyH1TzguL9+lSISE/DAMjQ+TeSjP+Ly4RefhrXfEW7zX

lMpRJo3pVXSn2CC9gmiuzAdJ1ewrbsnGasE+zuXeYt
AWNnfOL78LJZdhnYBVcpfee2+xMWR+l5O96aliy+jYMaI89rN4xeBP7EN9qSGg3Eq4TSKAAZ6z/jwf77

XpDg7T6XGQur+6kTMOvAlcC5D8COzs77corQDS2F2wpqsLkf95rzRnFKD9t2L3FThRxNz5oEcHcqvxpX

yOLakTE1kAmWKcDuJiyuEqnTyZcNEuiCQkcEOxM+3w
kU+xyZ3262+9rt66kXJ58aI/x5JukY5yjjHWxmJYGLsV9Efff7sY5dRln5x3AdE0S1okZXVPze48sG3D

M8kl6nV97w48CTuDC2q/0zEhlnrlg6YuV757FOu4HLkNMtDkDZ10T4BvNegK4H5Q+xtgiULQz2/JrmIF

Fsta2svZ2Wyu3QfF7x1W0zj3Xprfqh6P21be73i+3l
eaLTT9nOiT0Ns66LBuzplzITKneXegB1nrEiCuu8vmUTsNAtqs56y56+T7X35xY73hcgV5lfCBB/70MU

6hg9t4mlThf9kng0gv9sglk257IVPoQVBjWBIQVQitIIgUk256sg43XBGmg6gIaGGeKOa6CEGy/ia+09

GZ3PMJFO+NghbHQQRxvrTdpVWckq3FhBDEEjg/ZJzb
gfNn0yIfLQLSskPnrVY3pQ/gvggLkBOH8JMYDL4Xd+jla3SOHtdgvWseLng8Cwpsnp6Ip8O0ek/Y/0eH

dV4GYiv1uqATARJbc6/dMJBkEJY5KLr2x06PH73sg14GlsTtBqjka+kG//RLVklHch2RtU8F7FFAG+ay

CB0Vah3RQLmI7KodhTm78AIvUs7PxBqeCzDUnq2o7C
m0WPYVXKxgqELavFj/2wa4Ck/PumigjlE1Rcr3LTBpFE7cD/j8WOxTfV5yK3vILSINTBdlJ8zJ07oH6Y

6jaM/T81dl60PZjlIhstaG0XpctePI6Y0EO2diogPXswMbFrZVyt2ra1d+oQBSffs1SqeuRfBPwVJbaT

OyscUrk3OszVwrVE9w1ij36gS/zlNjh21BhD2riM0Y
10/dmmJsobrV344tjTM4BNqZKk0tS6r69mMwN6/WRz5SDkwb5oYw4YBGM5WXZqCujDN6lB9LefWfVa5W

RL3z23GeR7VjGQaYmnyhXk9qbeJ1pI8UhSds4voumYzd28heeejAMqlWyY7YVlo+PoxFD8lVbi5SyWt6

8NnXxX/bUrJbFjIKHAB1JWk5lgjgE6vM4AAzD8A48K
rm0fKbKiitQJwKFx9bNG8+HcSjbDA+my5zKodx/3HbzMkf2XXUm+iz+eOcNnjPN+cBdRzeUUmBP03MgJ

F9sGNwyCh1qhXNDBo8hokMOFfTaPgFIUv4lffPG9PslflI6n4ljkQqLG1LcnJXj3mwb1mWL2BbLjg9nZ

tHIyxRLmKLLvt/uRwwq6OnuLiSji6Bbec2QSwUYqxV
4WCikPEEnAQSr6Pt4U9PGBbU64mgIGYSgssTdzAxsNkoS5igTjXzOC/A/fdrC4u2eL9/qToEdlaGBwZX

JaqOqJ5TwP/64HWlTfAs9pXVW2HS+mK38qPTZOmfrs+t5+biTN4jWuHxc5nLIX+Rz6BBmj/foGPDYBph

9KqbngYXRU9tGVarbExy6NN1VWozeE2AfvFKpXhiNI
+HwRw7D8Cr7+Vwq2vjL9328kRujTcTFfQUr+BQy8sFs0yOOObcVfiqVwgV5bmr9vD+rqTUM61f3kwrHI

yn5mZmMVwgsNXZMD4uXsDSTDPiZ8lF8oOjCVnyivSAe8hQpYJ6VU/Jr+ilwiqPemsqESWOCTwpb5V+jg

2zC/7oG1h1r2w1LQTGpKKwZAYbNMATRsPO/dccdXGa
pLCwKqjJoa2YRPLdA51jL2QFiZfD8cREV/b4FOTXirkO5gcmlsP6zsFEuGUvLJS5FidJgolm2Hl7rkja

UyAJej1kC1XUlZMyeJFARfasKf+pJ08wudDtTTVYQw7iArkEKBFRjHL61SXZ5WYLy8785qukjh8AlHXL

kUJ3EjxEkeMB9OxcUk5Oma83m49VH3rNkhF8cZfBwU
5PJBbsACtIZsZhJAyXvTvj0L/6LfU7253SxKAeU1IdQhm1GX5Ls8IzF9PXamW3i8028G8RUaIh0APblU

NEpQA1yqW/0O4TQ94/GeIFyLfu3cci1OQwr/PZH9VCl6BmuRaiaypuRGw92qZY9huHZpndx9/Po355LD

T9ODJ2b8d7ZX6FAb7Wi8iLyGuHyn0zgfluQtk8THsG
06pxXFwEhFNebk2SWvT4IsBnZ1B1Wu1d+x78fWeMj9oR88+itJDxQVACWJJ76stPZmZQLwExQ1wIuWT5

OVDdZg4Um2G3Kff3vBveZwLc9Q/slnBoXbQg2Inbzzaq7nWpOd4Roik7pQRi94t/RG9qvQ2UPe/aWpWx

Qfk+8N6gYfsXf76itptnbr4P74VQYboJVzb3BvR+Ca
wKRmwz4d9reA+5hfmjwQANUAuEE5a+slSVFd9dcLg3Psdg9ueNAz/CEFgwtMcyQOfoyivqFStpOI2lS6

hTy7ZokIJfOFsYMB4Ul/wag8de2j8XN+Q5O9iUaB0spHdT4umAsumLKjabIEtJuEwgyuAQ1/p3X+U7ES

DvoVuFDs8p6dkuowy3nunrpfsQAcdexV/JllKzlurb
JYF9r3fIF1HiOIh37I2rnYrkKjTCBuvoGx3mph+tgr7/d8Kk2YKpWlMhWQ/0P8py5CaGRMpqcZcjWEOA

3DRzLezA4ilVb8i+Lc2zOsn5qDaJdETN4UOJLaWzR/uTFa0vLM2258/0EDHhC37+tD60UCfkbY8HjU8f

vDUsUVhDStGqR6BnId96JJxGF81IXE3AV0X0SHyp2I
rUiaqi6riIMtojf92TmzyfWCPsT7/NT7144ShJldhDlypfb7+73MP+g56eoMdpzuA/bdIvgcq/4tPX+J

K49M3YaRA+6yaRdHKGc74mnlH2R+U0oKPSlt9Nzt6/51zS8PdPH1XEdEzhJliOmZfDjsALlUvEFAoP6o

Ry+28nn9y7073A7AX+MdmtgXkb9jWHBRj/sJkS+LMJ
W6SzJ+Ee8j8Q5m7b6usYPoT7A7w17o45kEX/GOS1iRkfkjg2SiFoglOhgHteir2/it5rSHp3q2eImREH

wHjf3kGSQvMcBiRDfmKjDNEv94BQlLZ4SJfL8uHBjzBoTskvslwJZSbK3E3JeuI298rmempeAnYlKmOm

sd8kdwbqEM2hOWM5hTNH6YI1ufwQTKnBHB5yOdQ8rV
FKYK1dqt07ce7bbvEwp7fDC/kF3T1KQ12yNPN1tqb4uGVH5SS783gaVs82m0DUxFuiF7l9qPiz/kiQf+

ok6M75t+EQz+/1gY97g1kDAIWywQxBl2vKF6EkhdYWtzCo7y6sFNq6sCsCztiZXoP5J0Zw9eQpQg0z9c

CU0HKw+NlpK1DHt/W11ZuxorhEp4ZxkCV1LRCi6JLY
XYBp/TtSY/ZtRep71hMsUoawIIlzWoyfyq89CaUr3NjpZxwzWomWtcKtkRBXg0DGn++NSTYV1c/jNXb2

KMtDBkHUiRZJ1cX82APSp3adQQUGvI1qYsTQG+NB93tcybVgcScrIMt/6dqfOlGlQwg6c2uQFlQu7vui

5ji9WOhXqvlzYVdcOeEvy+G8BAk/79+7svjl24OZGB
kqapkOc9b+/wrwiT9dB3YQbv3wktNTMBbkeTLEIF3rGVmcumZPxhISMtQHkhZmRle8XaAhk8Zord0nZ4

89Ikrk1F4szp7JNDKjnR1QnwhBMBzMDnN7gwcbHJo8U3C+pSQl9eRQb3x36sNsFrHIu/y+WJXsQUoSr4

Moore+eKCb24zBSHNyKxfnDSs1K29/AA/y1HBA7+yL8gl
D4V0d0qXlf46rkf9MrePJ54TW//FDZvRbJScNaSTE9tCL5T86rEnQBg/5a1TJBf85hJZR14WFwD5zNYl

Fjrke9gXrHk+P4rDVxtJFfD23x4Yd0A8YbV3JhTAD/a1bqbbxikCTVx3BMC3b3u2QrpQ1smcuc4Yv7e3

o3zz4qPiPG6T1Vxoywp6kOHRgt1xPFH+C2HKz5Krzx
VzUEazjA5+6JVvCB9Zwp+NK/4CDSkZDyXxaSC4frMsx6rlu/74mZpFIXapsT1FzY1KokHR0HKjF4S9UY

VuGqT7p4TORSDYwBsk/MCfZmFO/iu/129bLGJMlbxft9vF1yyrt9xS5Yi30wRyh4OwxKxmmzRUIGC75b

43X3zhk7pciOYW+mf1EPBkL9ytBbghITorUUoXn4UO
RIasWc6xOVnjqpFbJRu2kM/5xSQBEePQZsyUlNVULKwfg46wM34QaJH5DYJAy0CKo1//PItigF7147A0

emhAQsqK5+Wdhrwf8Eg78apRrKPHwEY1+GHeQRn26Q9VinEjxuTpqPDnzJhFRW3Nr/J8hBzaTdc0Nc1S

aJYQPp7ziJxpPazEhqsNnTdER0t6M0LaKSXskn4x/+
E3ycUUq1dnMku7Jdpqk2JQx0KKseWGM8xlb+Hxz3viAz08En4EYMBJF2o/5+ANvLfx4rp+3AHi76IJ+I

LISlBvMtJMzckSysKk3ItnFRsxMz2dIXGDKbnGTVcbDlhFGxfFqpUS+q+6R21MzpX8AwBG1KNCQxD8k1

lHevn/e1u5VUE0XXK3hGDxYQSs2tdcEGhAQSoloSNQ
SkKSDx8PWRyBpMPbBcmPbkcxIacvZfJVqJeZAPP0T1YDbqmYVgXq2/TFhN6MkzbWm6s3Bz3fMGG+CK7e

sdaGgYuv1bZpw6xUsAX5EULR13VC6FtUFcF36yRS+Ocb+dM5x6a27c69dfazAzIm4CJzfts0rZ3rnlBx

SC32QhuC7HgQwlyM88eguuSlEQxYyc5GbfVbOdWpoP
N9nzu7olHYU/3g7O5kn/yhTUqjpT4qJsU7rn+1B/i2b2MigyYudT/lODrZRs30ggqh1LX33RAxff/F+X

at5J6I3GzaTa6tEQk84Qoi/yInxKjcq4oEdu3SDS0q42YFFw+xW+ilNhy7ZMspnCejMB+IqS8gQrnCNK

xGV7MQhrtuqzZjpPXodZLyqwVTChxR0BAX9q5MmKuQ
Mr0PfvQKUKC/mMDU8hR1I8Y4vZofJWYPlTIjx4ktnMBMsY5Mw0JOX0gnJ6aLx+lOyxNR5IGRnxKznvyE

yKJT5HbsV5WWCrJQNiAIg8XYX3G6nOh8N09BApad705DHni4Bok5LZIHSBw2eNX8b62Bl/UxadD06D3G

FQoU9l/79/WfDnX5LFNDABC2FWO5w6LWVofz/YJHLh
Sfz0NkJgL0+2LPMu3+5bjbLeGborfh812PoetBOT1PBJjxtd93ePe4orLJX9bKDxRKklHcl2Uhpn3zfh

EiYphUkXAsY1utQFgHUnJ+R4NPHvqnopWVG7rqc3Q9rHJVf6PKfy+ccvf9k+ixo5oxRu945+ekWUffXb

HRdtv1c8S1JGfdR1pI9FBaBVG8VuTvgx99b8rcs5ep
+a2a98C2SrQ1iAPAM0g+5MfGew8J9JCehRuinjp6cYHKNifwFMPq7wUEux0qE1MrDYkZ/M+KYOoBvSFC

+f9Tc4JsYq9TXECnHM5wRTdEcyf6dtVab67xL9h47/ny5f0LN950X1hv3LBUyTJjdPfFfqCJVmQQoSxs

18xJVvaY1BVA5hw4JNdUxLYt63ks3U/wSjxi053UpI
YvEXfBFJcEFsGknT3/OvtWTLfkwG4yh44TNg3GaYbJ7bMDojyJDAcfPrM5cSpizA2/VHsUt1btiK9XNv

8AHi7i/o0MOCvEfPhw6IY4g97+iUdWgg52qHo/YRRmdcVjRoT66D+1YR+gj/D4Z2bP8EN16aJAQq+QCV

Pija8KYXFVWTwIFVO0zTLJ7ysHeF9weevrvKwv0odP
2ZNpsI5TP6iLK4vd6rcQVefenktpqr89h1/DOXZwnLFT4ejwgCdLWKZm4WAAboFQGL1Q1BqM1zeDwAFF

U6Duk8eYOyCBTSXjYtWb5p9orUotuB+41ViTxX8LyW8XfjVfPcMhRgV20UQ/l0CDxgq2fRkhndScbmW8

8n+ux/n/NMAUjtHwPYkSahdnV6V13ZEDNb4b7csxPN
eMtQ1eVHRX1dk0Vuu+JL94NITnh4px/qfa8Tnu9TaXr1ZUd/4n/if+rdmo49YNSn4BB0J/wfv8EkBciL

BVhll3XnwzFwTCPvuWtcLyeQ8mLAKygQqZG15svz0WTgvw2tcAqXlSL/I3gwv3ow82uGQSRwv/Dd0690

LrpwNA3N7egMee84dBth90kDH20zACwqZY3ua6B/Vg
X2k4fdibx98nuI/KTl5KqzSoL2T50hscXwP2jUHxqmqKgNC0AGPPCwti4KRhMrODrOyh31qjYs3lNV3O

5d8SGCyd9ZNnVvlSjey7chrjy51NMBc2JVcVrZVbn6C+vCdTumvMObK6/jQAr2X929T43Qh7eqBqOgG/

zUtd+5s0gdE/SvFB/VMwR86m3LMaqcJESNtY/DHJ1o
54pKy03osHPgFwtRVddKBF4Eg1ahVXwjmUvGyDS+ApGclJ8/zlI7n5sAjPjq0UtH+LQ+cQ/Nfa0bxjmg

FMMAU9kWDEuzPG0trcE7xF8pZyivFjEzxSiJNVcyLlJlpixZ3ZelvBQnaAnJtzwBXPkMWeEd7KvS7+lS

n1KdKNVxO3AQmJHvoO3OpVv1Ap+0iLlmPeNPALpRcA
ZLoiCfdP8hvCmLBLhNWv/Oy4PmXXE+PILuNsu+XJdxqt7duQBz9WGjvSpta1sGQMDoE6F4XgBkSclQg9

l18pGIWXHD8AP2cZaxOIg5PGCDO3PKPF2w0y5enP6zkS9vKhf5ZmAYYQObKLuFJW9HCQR1XiRwChvY82

MOR01qtn9kwTDOO1K0uU25XYgAC8S+Y2v5dyWHg8+T
tMcwSZ01x6YoKAM5vIl0q94xG60giuCSV1oocc9k8aN0rNoKPdgYcCf7RFCalsNPvm5Ox9onY508/V2v

pEteza2Akc8vZ/QCXajAK+y4cF/V/gxQtMhJ2PXpjPGxdGfAmVJNC29yWntXSu5nzIHTiH976E4bPuBY

0KWfxVxCozrjbaEV7WVXos7CA+34f8VzhM+sfsS6xy
qb4G8lLDezyIsowCXpt73mH3TRPD2t6lYNXSPDDclzGB+KoiVpJtFpvSOlHQGo95AYcIVPo2fzbW4SGR

Vu0IuOty0tfqMedrSMoKa7e5f1UKuwPDVdDCsJNhyngDLz7ViROhuqGJrP6JQbhgJchYR+ecfRJWkzHr

ldB/Zu3UTv0eu6Pz+taMN9hX6/u1IGCHlqHLsECma0
D63ogHxPdZpW2Y/UKoZNUuGdRdtMHjDuNmoiDaw70rUhC28d4osb0xFIozUczgqPamiLVhvUgN3tMo2/

uTxaBb3rkN1DCt2RqYkYPr0tyOS4zdgaY07gK2zK3lUWNzUL+3ZewkkpnTU+6gCb8O7gF3Bgc2EwDy6T

o+FVYzj4vKQiu56bFgPqAAiA93LVX6EL7ApdGC+Urw
LgH+9EBjfuzF0m14Vphem9luTy1OUyphXgaS4aGWKwOH4iwdhzhvhawrJArkIf60kE5B76IMSDqizH8b

AGHZn47Zt6k5CHSr0bpKC6eTali2hcfb6Lvxrwcc67ke23kCG25B3EBQLREpWGWtiUyDTT7GlNbkiX9Z

U0HrMtVjzBxgxHo98+hIrk7+9si6AU4ueptBtpYzFx
hcHpTexNkbwWNIoeM7UIm1Qy1I5tpu5iny3hibckmAGPo5gDagbmgI9ggc5ZKxJgfe1zxXRxEU2lK9US

3f8qDADHm9Rst5dV0VSi+uv7bNdOcydK6Kni2/8HPA6aqcnboJA4m8KM+JjUxgRgb5r9FsL76449dIJG

2lka2csifSFVVQTDo4/wgpl5g8NOcq1PffwtNLCiNe
3MS+YVtej2iaXhi5oj0cw6o1r/rCaWa6TSSitpWkC/HvUIIUYdsA/HKZgK+5j4/xiNQAKy0EtViPuARg

ocwXfERIlFhcKHcaXQehV7H/oNlxQ2aNW41E5pi7QkQ02XnaBw3D1vodqdlRPCESRgFS4u3Yzy79gB3g

tZhLsNZICrSHMcR5Yks+Zk5m5WyH2yy6JdZL3YUJU8
84Q9znxaUaDju2IJo/xhBwpBCsLoyCZcuCiShKR4TjDDoZv3JbxzDKZd/7o4p5psiTPf4DEtRfMXCrCn

BeCqRl8td3bn5m/ZLt34QJGAnd0xwFkvu0e0Z0W6kxGMdRsfsE2iwDLUsV0+ip6xfYTlxn8VyGOjrKsw

+mwHmLizRn+h/qrtClPCzkHHGeXAkj7NkRf2oEFfKK
0vr4aPdnl9V7sumkj8ks8YBTOu1L8INrUXHBN28W+inyU4vx4zO2+C60G3iVqN1SmFyPmmry4zf1S1nC

gA44EjllAyw1CwSmleLndTsyZqnse9I6o1TjjjZkV0QwujKlzBDo8hd9ZPbH9oyDPWdq1obDtzoU64E1

od94tsUm0B/GC79Pq6Uynaidyd+PrQrYiav0hR79Ez
SDHbgjj4v2Za4PWCrHPyIsIwnGrhjCdIuEfFZm/wQ26fZK+tKo3TDAVTzfFOC5XgRx0NC6QJ90j5bK+x

Sw/iuPoxFsEUTnEF/fPNbaX1MlIBg3s+uMkybW41kbPv8nPJ4IO4R6+YKYPh/pVSbJc0+64Pi9rYri+E

peIrBaCxyDcUjqRgYyGiz+xZkeEoy5pEVKPJnoPlPn
NM48RPChuC0GyD96CAaFKqFM9BZsinh4DWiR8bShSrZezHxfYTZi9rsJFMZVZH2/W+WABWF0X8H8rFP0

RdP5SmzaKvHzIMo8pfQUZtnrAtegyqGCzjwMIl6EsfpCJP48sLuuIrSW7WFUafDjg3Dhfmyhvby4udTb

mps/rwn+/BMwjuAJ8xdcRSvWJeHGykytzsBJx2/Xkp
mTCeg8wOKOkAm24AtmqF0TKpW/AfTpElxM0yNnNrXV80zCdiL20pXs2nWLItbRoXYyRbQSw4LYZJN2ee

TzLKQU0XFxx1wriWRLyPCIlkdhXxng2h94F+GFK0l6Gaq29rxGxy4ELjgJXHu2bA1EergIDQoy1gcHMV

9g/0Or11Euvoh49uOxkhQlg36Iu+CiJG1dnRoUs19h
z0iBle4F48bFUqFtIw/CHJHZKblC9RZEOWwVaKDKOgZrdN1pyZ8+vr0WhGBeAlTjqoj3x06/8uUtv7Mv

hBfZWkL7V7g3cVR5vX7ZLA56ddGdYF8tEuHs5iZ/Coh61AOlULwMPsky0pNfEEyeDldqErS67gZyi3m5

83tDOtin8OOmkUEMQezKkz8MCMQvXxgCjkoyj9Hu5B
o/DVzNrQoTXRAlY7RS4f2wZ49DakXxS2fLWx5MUT8KQZ8GB1FnZamARP7gletuk0Vvfa2M5dvIFIxfe9

90wGm2TLkcRnqGzfZi/C06Bv7f41Zo0r3nxkk1K81KbrEUoBMn/0P47/tbx2RXkWwL/5VAjAd3GO5CYO

SNc69oKreaFeeFqvp7FjDx0YSHqNm2iMn4+ppC0FWL
DhUQD0BeoMaJiYNmgN6k57jVLyf1a1Ce7+D46JFWS5AO4Qkza8zH9hlTaUFcoqh3Sw3y7DkL59+1iDWV

6w/TJO8vUtAddV3AFXZqsK5Et33ydD6Q1pyJgsM3a0VwZJIMfDEbs099j+89iF0ivhgNmhf0eCksinGR

B1GYAG0KCFOIuHV1kxxON7MrkfsSffiWhgpoh8dK/v
AxAFT4szvJ1G8SB0DcYO9FTbEJcL/fsC2ztl0KxkrPsTcXOjW1HAVqHYrqzDM/0dBrYxaaE0/6Aoup3A

Xnjufhs/ymW+f95W5ODDARBX5lUSe8h3slvKvplqrv93A29hrEuIZI7GZ9Vg/akizWtpTWMCyS0IP/S8

ZXl48+r4tH/FYqL0k7PaT93Z8JecLIcZCy44XfqPTf
FRteMCQbAFLo9wJd8rSW/t9z9NdemMptrn+xrtf56IqBp03YxV3B/d62x1Z7TjVF6lBFU2z574CEt5/s

9ADv1SBCsobbwh/uG41YYOF32FdhnDZTK1Ew8dxOINNE/4HvD+YxSmeZKE3gLmFvN5CEhb1GKHlyyzZU

nu5jvgTelpX1qB266s9gtOTJrwo9MYH1evYQNuP/NP
ni8qYjE1q4U1VZV4MmIuAEGHV9pw2vArsBse7xgQXbYA/x2znExu78Aq9XMKcBEqGoe9LnnF2oQQoMfL

sWz8igBuPzSvks8pi2wkQMucwMMtgkUcA2T7Cl9K+aGoCvMDbL2EOEjlbzbwFNFVahVzlpf2nLTyRTg2

IlGNqIRVdj7XevvwEAWCqAtcs/vET0bYPLwZz1PfuC
MsN29cL2M2uzRXTm/oqxIxMeQb1sFy7mOJitgzWwBuTHFSL0Q8Gu5imoZJ6diTCKS/oscHczx9Dg6Mh5

8i1f2hAf0krDkT5y/I0af5hDGzeX8Ynd7fVZwC+f2Xy/3GN/3Ogw+9kV7nfQrJeyUrItnBPCoqE6ills

F8I1vwyJrpV8Ur6HdHkM9IxKwAX6QRDJyZrO4fjIzH
Q96UGJnlPIW82HD0W+/JGb6hgOAY7ec279+HhBWjULBcZ+SjriPZrqdqIuF1D+kvxdcycoxesCXKKK5m

qL9rUQpKKFUEtpI6NdZMC/TssuvvMvQu/DJRZkWJNBsHCTazEyMo2nmdnQe2ZtTy09bRId8atqrDvcuW

TaznTE5jrB6cNSgdaUTi8q0bu0o5L+1y4W7CG/F0Av
Gd7skNRHTbfNHBfvAE0iCNmAd5EIRI68YhgPYQF14w+l+IYFV7fb6sipzz7xjM1on70vqvBabM06Q2Fz

to5a4EmX0fJCK6gTz4eW+sVI3891QOZ+5bOL4A79nxl9BLB7hVXqwIkXIbV6hk+tVKRqxW6eNDrpzFnL

0qaC5QYEFp238pAHxb6LiLPt4C8OU6cg5abxqOxe9j
SfU1+WMB6dO8+hUjnD13VAnXiQl2LlDtSNPA4Ga2Fcs31sC5og88JeWcs0s5ZQv76UKpHt1Ny1SNidm1

JoWMiwzyouSie0/qp7c1p+LJj5yl5WBm0W1q5kFGtRTAECRpsBX6yWaGi5a3L/nVzLxYuT2HDWpNSfH0

6ulcK3S5J9E0vZ0UAfSdaIP/hlxQ3QRnH+pTxCWtTB
JJpavOTxzsEkBCDKzNC75fp1T12u7H7h8UiToTXuzNdsyAyelmoV17+/+8RdwsWE4CLysM08l06G0mBs

zE+LdmPiHb6a6pClAPgoxKnIpGaRTt09yPzTaqxy1+CeuUUpKxKisRTk4k8MJyFeQrRY/RAHrmcsn9xN

sLRarrY6CBBlAHq6q+R7/5e0S3C/RgiNw7ABJ5xgB7
gNL8ReFdPBRfsY87Qtum4mialybG9tH6lN6td+aSuylaqMVjLYVDpGecP1hI4RfgQ9qPXiUt05vNCsqr

kCm0qVQEGg/sgE3xotvPialYkDK1w14yvUhIkOgexFb7aimVmSU2UjN0a3Ox0QmIkPbU+lBsoASV2ilr

0t/rCx3ao3oyrezEZ9TKsMuVXlagrgOeB37jssQZDq
b5LS95TyC0k4APHUhB4vPua0jxFEQEMGcoyTX1Y3Z6Py+Aut0tWpiQLpKRrte18c5K5e1bgsnwLUxcqH

6OVG20FXN7n0YixS9xPyb2wFJEaci72kxc7VdwEXzfjIRMq914Ol0+LS+frgGBve0hNodfnrebn+WQvZ

eajIsItQ0LZYRb1WiU/Zr3ZZsQ7ZrmxLN0pp/hJ8jB
8a+i4XMt2n5oF46fyEYFD/wI6w0OWHuN5nqrcT8uhnG6affMKGFzDF9UQIU97MtVUrOobTNy4P2t9/pV

cdA6R2DxCLafR4tpFWLGmrCcdJ6Egqs/Ka/oni5mMR1ZIAOxI4lE3dq1b4QNQOf0/6KqmEVIG8nwEU5m

szPW22pyYsyC+XiSYk9tnA2oUeueIpWP/P0wML3Pbl
eiJmSMOOSxurO610iwuOK4EOf24Jk+U+EdNISvCmNIi9dir1AdTV8AY60A6DFz5n+TvF0L6pPhoPXVs2

1WN4zV5m2whKn5WAUM0zh3Vwr+FdNmuZyONrnRPwogH86/4LlderGxzRclynP7Fw6wcPduszZ04N3+o8

llxMrRx3tBRGXLbidSlRpaeOrKqc+5Bua3Yhk0JhO4
iby9bqZaV1c5PFtnTfnF3U8xyRkeY0+P4mL8/3GHEXhgzJsC06XKXyJ3ZX28bSyViIUNLElBosQFU6HM

+vzV54nl6/vuvIK6pGjiMhnak1OVMWbX81LDdHogPub0QNOSu6OGSO4H8fp/mzm6XeV+Zlvt3eIK9Fjl

dTa5Z2xluol+1p7nmY49oaaINmyQAGHtr1nzcftcLy
kVo+Pucu/7YvQyZ/6415vdp2Sze9NbPOFAoiz0cpKCeML6w+5MzaE8nF2sbT21JP1Y4y7hV3ldhh6DeO

25G4f+4c/Dj6rOHkXCiWdvFOq5YVgUTLgpFXD+pjH5rXqqwqhj5g2yF0w8xmsF/Vv9DnqLmOkY4UwFwT

SAg1ZE+UIkVtEK9LmF8LV3EJ590L2HOsupdJGAhPuT
Sd5n+M/LSh8ZshzSGGLSfmJlShhxD38xpC130BP2R1pHYRena67TQhFXmjGlyO5z7pjZC3+qKC+1Z24C

iWRlISpO1K34wMCHuwu3yfbxiFu+U9O+MjOo09SV5CQ3PKtHdDGvmwMhNHljn2nzTS/yavLRveqQHopU

Wg+XoCns/LeU2UEXuLF9fPY8CTGdzmCmqGnrYXyrWV
Ckt2WFHzEzO0Hhpuh41BPwUk1hUy3CAl96PWDWEKvt/QN3mE2RXPKjegCGaBRCeFb9/mbgXSyAlaO3+/

ue3ii4qItEDw7+bBUSN3m4wXwaKx5U8hyQuPzdXFKHtNJcUJwLJ35CMe49W5W1zTUSluOaZ8Ny7uVea7

iK4Qi4Nedx1JtOGybVMi2GBBPAo1oaEFYyTmgnOs7p
VnZkLHIUTSO32q9c3S6NmHNrsbSMMNsvI3aGRVp/NuCypeOJouNv6c9nklWqjRqUmBtxnmnIkWl+nbi9

IvrWqOZJol0sxTNPg+TvsXwkxKo6xXTgjDg2gfQHAs+kvVYifWiPfE2y5zc36tiYZRJO6dfdzW2iepcq

6BAdkqiqitgS314ViOYGE7NXBq6rQX2Etfl13r0yb7
htmOMybphql6Cs/XH60WnEdNPS2PjE3A1oetQXn5zF4M9pJ8GGW+mo9bZS8ke/7IjHAkkoslh04BP4Nh

B5dkbkR5R84fRjoYfYpMQ0CrOOqDLwehHR1OyIkbdXiZU1UtfjEKEzgqpj02UhzlTomssb2x/LjsSytQ

wzzQTOwCzkpYTLJp/PVG4sshImYL1arJYSIIB48VxX
uRCk590VraN+ovoYp7+JhQP8K7kqEJbb8og6GJbFp4V12EenEnKyHdu4mCfc7u5QMpChXAv3/UV21uLj

UPIOFyOCuYXLUOmMiJ0X9bulYv4gc3z+uL/B2tw9nHsWv7jU6xfbH3+LtaouMhWiKZv/Nr/WOApXt5vV

t8DHqAplhqi/Knt35+Sd39XVeg/f58m+96r5GwcJi1
1rR7y2s04+LKyNuHVbzXPeqk4g7vmdeFcSrIMjQNV+erigUynkpMf79xA6n4h9cyZCLMdswnoRbqWYTd

c0UlCjyUxeoTEs+E6mHltDdz0JBDLDahp17X3kGLx/Z9SBNGhQQXC1zxuDWh/gmkQnaNwu9bfm/6DhDl

Clt0a7Dr62GMwDYOaid1rA/q3Ya1PsmyG/nvjTiHek
lexpRtektIDxe7FG0XUayrJLjleWLVuWVNTFxYdbcYKLEnQFE74+FwC7n2+yXDCe1N/ThL7F9ORMB7cs

6buqYKkXiTK69RujXcR11wovJ2RVCkKLJLWZt6PaPlHUDiSKyhOE1Dbl2bbEK7OX+k5TVuHFrjrPtJS6

VoGoyHyQowN03wJkshbbbfMI/Nb4Ys3L0SNDhW1DXf
Zy/3Gz92fju4CvhRC/8tDkD/dTG2SRDZhd5yB12GqOLFnnxukzWyPIfk4TPPnaDN6h/rFIuHes4dBMHK

yYRR7/JVZWaQ24a3ndRnwa/Xb7hIKZ1L7BEpxLNW9zLGD6dAY7t3YOiUM+a7e2srdZl/zbu8/Q39yglP

ZCNIGM9L98fW98xClPCadhJvPmljwjegd10a3t+rVv
JA6gQu6xjtGo35XhGLF02W8pAovmaeQBnKkxg5sF2s5PidzRAJ/NMFcvwo8olU8KtsbB+BmUKPDB9uOH

AaNMQdgjzKeiofyWqlmel9hbjvjMFfAOsxrITc9hreRe3g+innBz3slKv9N6Dvhjynp26It1rS389Hvo

F9Itr3lm8rqD/4YFfDA+9XEKnCv2qBse3LtsS3vh0j
wy9i/97I2DcoZe9CryQsZWMU9JwY5uraf8GLrqXgMDjrUcLV/vMY5UTlO1d4o9yRgqpDZYcU0ptyAuRX

4n8EY3pyrDh/KpSbC7mLwXTRBbnQtmcjBLix42Mt4Nttkzr3+OXyAzeZhARY0DD601tc/IAVhag+b28b

at7d9pJPiyxnmfuScwr/1RLLSEW6cyKCyK2rsIOwDj
tFbopVVYrhRp9zX/L5Sk9VcfPIIZhS4CZQ8IL8e5Ex/y74YS+lWkB3mVqcowB/xR+EmsAFMM7ua4BnX2

eO9I0fuS2LzV4shQvQ9VcEv4T5WbqLlSlYm5x3Ui/hoAolvD6g4HhZ1yICMId99/d9T974L/2tUSgH1s

JiuUdWdVYhwd9cleWhDSMQTRAgD6f5y2Exw/5GMCLb
+A61yyadWB3JhjgvvM5eGicfNuqRFFW8XFpfsneWb6frjJc6WYbLYf4vi51N8Cyu/jO7zBpAQTHKlxVe

Wx03DSGEUy5VRrqytxVw5DkqBVNU9fzS3Nvx4VNHSWRYbPYNm5KLO9Ey+EnW1JALNcpVxNtm9GR1MA9+

6gDRX5boZzCJShFDxEGGbnh7nrt7MAG7hZyKVAQhd0
swIpaNb5EWNtjkBHcp6kliiFFRJFJhZGDLg2QcqBRsbdBmf/w+TcjUDWC613CTQ30JmtsDZE4GY1HYfs

lruT18X1/kQDQ9zDMx+8uFlT32IxxKmoJBxwguexN/5KgQFcgOlxgsBKUgjY5nvWvwpHRzfZt9hAhhUn

Th5L0h559xVMxcELqB+r6BQotA9esREA06ZasUY9qQ
5rl1jzfbYAe0nDTaK0QFgER1r4FvATA9KGiVqZjkrMUW/tfAPzuhn60QxH6ddoKS5/rV/KD0sMAYV3Lt

83Oae/GKsb9citMxkmVOFO/ia6bTgwjyy7vNwcC0KTMuEGC4C6xze5Wm3JuiUTNG4trm0xdMQ3GbbdSr

gUWXKvYzvvh1l5igvmTIa2T2zB7xuiVBJE3Td698RS
j54uYywLmD+BUpRhL65qOXPWJ9+vB+LT2IXD5pPKOgzcjfD2ddyxkTU862zaLXPVFnGowGXaHK1exDY6

jsPR4vpcXFs3pS4si76QE7gWn4tvAJLqqWZ/5E1y0AOHdVstwC7/i9ZwZwbYLEBY0LIjgjH53uCL5bUK

mCwsMfA5CAcJnls8AA8i6gt5L5r2lG2WY6apy04LvB
95wQJFoIPXVWnO4KhCJUu/JLfVhAe2lkz3fasw4QissAUccQ5ysYKUGZnEKWu5dFcVtUjBtfAQV03iCO

SEmh7cwRjz0b2uGq3agrG/TkrlS7DRn5p//Hn3Rxl8tzkF6M/g2NaCwoISsxUt/T7h/nIBMz7xUJIAh6

vgyDe3rjP3yyw3e3X7z9Aa1M109m1YYm3CE9ZGe3br
YhnDNN+WwmzCnGKU0GnWCQAnABshfwL1ltbBX2DUh8w2ab3uXFozpdZHxwrMKi2QON0y4lihyFGCqlnT

vb2QcYjUA+aLdKXDkWw8w2OK5PpIPiObHWyZ6byeGNi0OSaLeQhcooepr02gpRuWmm3THALw2tbW9W/E

N7AGLMW/bXY9ZUNFQRoB7QNn3fzp+CAq6SZgKXk3aN
lN/i/JPKx48S65ogdKDTokYGvuw67SDZJALS41m9zUoa+Orq56A7m+3p+ykkOvbhTTXsVrPLOoccRc6B

13YYaFDwwNCZACdyIEjDA3L1cvQifdGxaeTH1NsVJIHAHGOKd766zpi/xnA7+m9jLP+1BHBffiBk1XVK

1l82b4fegnpwrf15qCwpL4SE/m0BgcIuCXppsFlvRS
4hJn9XhlxS8jUtBSbQ7AdtuXrvY21EuwtS951JVbShGuDcbq+4ra6Qzq2sGvhBYfANB6z9MbKk3Oabwc

4DZl54SolwtZ9UDvKdJ+PDJcTcwd7FrRlIIUuLKcDkaxHEcswkxODZWLdy+NF+MQXzUW8bwhA9nHFCW5

jKoCmZp7OqyarkPW+OiRINdNzDwvSvml2yiYV1LWS7
34MerbOIxoNAOr709GbQoV2oo3PkApgcOTsf/A7ruijmXYMo/zI4slCAd/ZUcaK0l0YYtvKCkvQ6Vee2

HzPJi2obn8tOOa5J1pGytgN2KVPmJXe24IxJxjrPO9T05ienjiShivN0Dy7uZ7FQ40HtEqMvSKFBtCZ/

TXD620y86MixXweQriPRv80cFzYMoie8xj6AiZ7gFf
wfuXl/h282D64PVwNgssUfCap4Jsp2mleBEXOy3VORrlZ/P0zwVaGv/dOtNpMxT1LmR2Twak3qEmX9Lo

4Cl5OkGygf+qWiqARQVww6lB2IjPgWMKh7n/V45oKJ5KTUVOBuQHWEcBf9fTwjDgC76zmogvb5zDw7Mh

HjuBwYtZDiSFiCRtx6jwrYEtV6TP8Bfsdcc6hUwwm5
zlot3uVrPeHx1Tdz/kBgHXpE/LP0boigKZfHqHPBY6br7akxUDYseSQccg7WWYGtpdmCCjUuAfmBENpn

nD7c/XVGhVQ48UA9n4zqPa41jSNXn0cU2qeQ+pBhGn3DEASn11glblgY42dmNYsg7tik80200RLEJkvz

dc5uaiVkU1d5GbeWzmw2JFOf1cy1svlKQQ9gLm2DDe
WYNQClW1FBLhhdEomGoSFfM6xtpi9DqH290wXiTYQGeIksJ+UFeXuZfyuI4Aa6wfM77zUZ09mTnypkXt

g8OOnCQmjqrrogqrV6aIoi5ejJt5Yl1Jm5kamAsXd2cYKLXRrv5ozR7YetS88g+8YteE5nz7hmyYwiVd

4GNfMG3VRnnZF3kfca41DFP7/VOV7/kbqK2hiDE8Sf
HlcprX69O/CQ9La0CZzOpicCWruLXJWW+1Dfrf0tJeyzTfIptywXRCMlrNtX5ymO+SMVBXCfACx4rUI9

kpGmpcBWm5054cCnXtdPXXPc9a5VxHHD24W4WAXjSwPa8BPWIkHrxshzTNgGvvw0biHH0peRv9p34wT6

Otgsotx6msUoZRP+ZpUi0keUxTev8ZIbC6dlpw9gLv
ntRevB5ON1rvWIVwDAMfM64VbPUIannALvYmF0bUIBdDqhIRsoqnp8wzNBzQG2sgOaF3q1AgL0PSHnSc

60qKsCiMrcJHh7kOYe/xYeefArHnE1pF1G9xH238PnmpE4aUWnviWphlHpepllXpYotuzBZYGkEY0vD8

MefKxuYlMj2XEqY+BSv4RlziWg315+fkVN/Mql+U+U
j6jgmRtk40kNUi6ED8h2X/cXr1Qs4u/gv3LgVK4NnYKM8kOIBKUAltarSIx/NAePqF6B5V/1Ag3E3FaG

/v11tzwt0HK5VGbXVH5L4IjwAn7974qnNrQ/SaT8/AFXrGqUGlxS7Tx+K2SLvyx5FeQrxlH6G8ltoFOd

yhNWCa3bSK0Wa59DpLEUY394PPqClsqeWU9v+7zgC3
CqiJvibQGSay4osAdYJN0Ix+EptyNGmMlUPqYPmBiaqXhTp/0V5VxHdlJmsAcKn8p7K05wfzDCNXyczy

P8oJwiO3kevfQzMKKSsyT9GEC1xPecK7H5A0nw58j8JVMxdjdjbVKILjIRi33BYz42PdItAsNpqF1FVG

xBRYfI2RVnK1WUCap0K4sl2LNMNqSMfuPfcogaLZAT
JGdD+oi7EwG++BX0+iVOiPmXHOZkg/D5Y0e0VtRp6dI98BPTQn6VOWeoGxQ4htg50/gyrhynlhmyRhO2

S2o/p5DIBP9Me6pabvOLbJad9CSCkdon9ERwvnv2uHEpiNjYhP4aXK8v5SZR+Wl0Nrd6iWXw5+6snhGw

9m/d8JnEkmtDj7pW69a/xYOkCoxLr/JeC5QOh8d9TK
vV7kJgmP/VNmO/8ZxwBQ/9EwLDbY1cmr7VPKv2juY1pzGU54qJaUgSMt5yozXQpI45eHN3anUTj9E76x

8otvLAWbyabmQdq0AUzfb9aOo1ww3vj5kg6p8//JX+YlH5lnhoeBd0fpmpNATYBGxNrCJtyqxlOfO4Zt

Cq/NxhRZc1kGHQBL+1cgU+LLTEFAJbuAkjZs9Xeyzx
HGzsfutT9ZKzajnqnGknEEWrKxF1mZbW/x6GOqZ4xBYq/YLF96gSvJ4gjsFSeYjN8ogqpIBglvvQePTe

lB/rAoj+OjydAYnNgUA7Kr6oI0Xir0k3B/Zr+BORG7CIDxuHk5J4ULgR+/zCvSf1f5DsFvBVmUN2z/mH

Cl8KZT73274SO9ClUqWcm5zq6ChROC66bwKwPWdor9
vYe+V5X4FUhQCoYuPyVV0VaawiYMkGvGaQFChVfcOZD08c01dpv/SR25cbvmKK4nU2VejyOGIo4VvWSx

b4zIxW8j6VIAdseAhi0NjJMrNJjMoOpwxjPU61r8ntmvoVWnxGEyR+wmvWgYoDRavxNPlQMR8IIZ1iQo

Q/61hFV8xsElKPaNmFcsQT2GAy/+igusGqc1Kc91i/
5L1BHvbIIQL8Pfcy7fIajfmaT4yz3Dje/SFcwzS7xUbrD4D4VHh6atF1V1w0Su6PqzqBdeN47OUV+9pw

pnMb7j/XOB0XOqHO3tu4qJJume/tu8Bf0wG+ymCb1DD65jkUM62JEsofjMU2l3so/Wdci0CS9+IRKMho

dDR+a2uUCQbJobAPo4gxQ/uG+Mi0HyzJmd9XdL
9/DQmrr+N9hSEft4+63I6eIF30DR88FAu1HsN1Erqvz8RUaeoG/UPnrHD7+5CrejVWecxJoePqKF2Mkf

2kLPPuLeP85jyEoyNLK3Pt8GzC3ckG1GHRnyXX38VgYeF2n1UBiJMAul7ZYndPK6QjGSjKk01dpr34+P

wNdTVSA14cunE6PVyjKtjBWwapnRLZgh3/rnisim5j
Ov8daOb3/M8bt0tNcl97MZDfkU/1tqliMtJVVMK9FZQc1uIh5gz9zabVCi2/nGQ9c2uNy8rnknL20haC

PULcziT9bS/afNhuky5zO6bcq7KqkMNPkL5U6lA4QVMur5RRji9D0n5Dz/VZc3r9SfKxAjujRzjhxXw3

a3nWV5jQR9eSCDGyXftb9+RXJ4GpRUSwoUe+344+s/
Zbjwb2FuS9B7Y0qFtwNj1lPG0KF0GEQN/amtcBBkE0krckR5RHV6hK6wJ0H4Jb9f2i8r1kjHUQv7YiRq

SzRcxfwo1aZwYUZecvW6FEzQty9h2uLmLAHmhq0YVdwgTZDDRwB3rgnQNe/iQz4O6BpR8l1e0go4NzY3

z09SKcyB3SJFuO6gSmkgSnJ1ho+j3bp5cI1bq6p9FQ
7RCluhuZT3gMdndoyQgJxP/1YsNtfRn1QtSSBOeVD4cyPJaYE4iewg61h52MGC0cHKH2dU1bc7H1GHbW

CWiORk4z+GKGN8f+5ISYJPlCEWnju1uW6o+dqBy3NiK+y+80z5R9YmCyr0rZY2Zn/2BZnIjcwfs82tVR

MfKtCh4UJfQrNW/vXGlt2do7J9DlTnmCL3lNwnxhdw
uDdQ94DyOgGY57p4zMky4OuH7qyLD2dtr7u0AGMfQKcIvjYDRhXOjT+zFXXXvRNGg7h7lU9rDKQZ0z2d

yydN316Qo5uKuGS+kJ+VoXdo1mO8ulufdwmAqWrr01MOgbUVwxg0qaRv5UjgFF98UuSqeiGzTQ8Spn9i

dC+1povRcRzpKhE7ad58TY3gLIBnqoUCKojDyFjxfE
RVOnXaN2SJyWeuwNQmQGqjTG2OFY7uwIq0CIUaWZbe403olwvCuPMA3koZ+kDJp8/CLq+t5LklF/IwD2

+Jl36EZ9edm5UIm8CZ0+67eGt5fKG5/rhXBgd4WxuQWfKX0xflak/2RODTDu2lcWbNJIwk24lxZjINUr

tRVXUtQo+qn4x0d8kbWwvIJOwrQMh0mW58F75Ec3al
V+Dj56funHpDhFrwKTQ3ORWDV+70zq3nO8GEDbJwlRaAlE3+siW3IOXaDLtW+pOyWBdLturQmRR+VLcX

dJugIU1eofSQVA+c4HAHZjrL46SjTTiAg5Bqpjb2EyyPCebAsC2L1yPCyu7usi+VDXk8mvP81ZscScsq

3WKhc0O2O3g+S/Gi8DEuxY/6PEuqaWwpdJRZq9NhEU
x7c0v/E+yjHvGK2+QmNvqXIOxxAQs/4VYMr506g7rlW4b9nf80CVxHGmZN3eCHnNGm8PElrw9xOyAY+b

7IeeKbQpxP4pXmcuanjsIW4ooSxSk33VwpUoVjI1w7bnWVw2j3p4Iex7VfwBNyqVusPWC86M5CFuicHi

d+Ll+Ac8B7fUSiE/aHGkh7mWmWH/U71RfcChsKjB2E
S74TvxCyXWkaCbpx+FSVGgvP9CeZF6bmbR0zObNMh2KbTyXxd31ixkA/Pwx2Oc4VvPn/eqCzePTqrj3V

WvP9HRlXIt9Kbhjucrkq1zfk0c6ZGDIcKNcq44yPxIFwvFKFBndF1cuT2A5CsXS/l3rG77XfVxUVVfSY

nBTFle1k1N2mi6oiGaokrUNepRZZTOiXOc9pCNE50X
rzdZ0Ql7xfttmjGRvs4gPBErFXBuUWySMKxs0yj93aXhHIfGqcCoE+x+0kC+8fxSXTL4Pcgz1Q5CMKoP

XY3Kk5ZXHFog7V9TXdjk97QbxofejMnGH2jF4mdkGcAwoxbtcyTnOiGlhzkEgJZbvf4HmML0p4A0qp8r

+ggJR8k7+O60EwSwqxfggy2C7N78/RaGPJ/QgJnWGP
HG6S0kF648rs6Mas3+tE1E+IyVVcwUgd11H90iX/09roCko6FJVHOsUV4K+g4t+lPWOoQ3kK7HGAWC6A

CguoLAgIFS9sSwx4a7kN6sWzrfm0ZrysyDekTeX5uD6sPWDX9vjEGF/imGimSDfi9QwEutK66LA/4r5d

a3hZsmHihyXOtqPruhYKl4pY0yFL2TvXSuI7Vu/dRS
R0pMeU2gpeJ00QTyDcStfDwYSfI+sKSmYVgkMO0aOqh2O8CApgn60ExRNikoE/XCkzvv3vTpk8W+A5ou

Xlo6x42UdJuBw44A5bP/qrOet5q2OJFCj9z3nFbcLNpZHJ5XTcXs4v9NQpws3VE9aC5Cxj9ZIT8UHiNC

xZhWV/yvNz1psCWYjoB8/2lCkTzJ5XC90+seIv06y3
et7Zlr8Dq2T2cbrlPFRBycqRPxSAwL2ew/TV/j+0NMZuelLnOdZYLJtZwi1S2rVg2nDyy8pyVN2a3iqd

E2wb/q222cri7u96eG/I7/tj12kWR4qgyeOI/qy9oskEQXQfJYSF+o+2XBuv0DuHQGhmisx+WXjA2E6f

oFCWfuj9Mzgl24H1FE8T1P2rFvWC9CSKvBBLHkAEed
7LONFh1h9zfCF/pVf1eEVNFYUCxFhO48Fei9pe3AGss02mfH3Kqc+D7PmVWtX0X2zFvRP7h693IxBZ0D

9qCu3P6XeLiUo3O6pxQzX1ujFWsERO1Tq8/3byh3RuOKDhJZWYYO2EXlraed7oeCSp2ACvGevba61Onk

seMci9EINOUfnu+0B/2nwp4BSY6ZOW9hv1l+sxNxD7
05en1wywGpops4otpmsNrUFBafOyuqG6soLYDbnIyh7ppc2ok5xZPvh4Xv1x4T5xqZk3cah6qfB1+bad

CuHdKrVM4maXC5anzZSnX5YucAzAv8QfDM2H5b7GUCGDtwt0JTXj7Qax0hU2uQo/QEUjiHTHKlKIBlTi

Xb2WNWrMmtVwSIDLnWZcQ0H5hV1kEOQFFiwhg8OOe4
tL+Fq6PFBTt35Zb+hOHhJDcUWGWzlR7c8crrl6apHx9z/i+KaaltOUkrlOd29Fyb3+s+svorJphYeJsY

6p4Rckat6LsfFDrR0FpjnmC6ial+JBUhHUhdvqdG/B0zHetSrfyUCXv6uwDiMzjUd1rSU6dnW1E8v+7w

2dkzHc+fdNGcglpxpQs23dg5Yv52h2nQ4L71R7a0/R
x/W6U4dPBY0B59DoXFJ/vkXSIs9uLPQL14K3leBcjaZx9qP23lz6C8Bbea2YXDrwou3wVV0OZHwxGaMR

eHpoirpwQni7qZFXl8Rl8ULEUYD1WvQUUfND9eyCFMDsqE//uMcHCMIWbf5Bybvst8aKfue0LHAz779s

E1OUwuCzhYdmUCNH6e3uCw+LU8AWZ2+Lt5Autvk67n
wpY/XxaCf5idfrf/gdAJQTSedbfdTNXzLyeVIwg0LY60bacY3SK+sV+T3h65+btZ90ZSuO5HH2yQZ4IW

YOOoa4yZG+Zv950l3jpMrQbG5ySLhWa4LACIfz5HaB1PH8cLZxqIDBIpmP6QpjCAgl7puIEHZkzW856M

FdEl9uCSyRl6a6tlfsATeCO1SN0dKPPbhvIbrUFHWw
bJdUOdR/lRsSxKmK1KRyj4a/C0wz5131g/7vz4F8OGH2RFk6s36VhImokB2gk7KwL1oBLGbEpM6cqvx3

SJkwXKnXLPvOUDya/mZBf+mmbupwuOi0EWQzSrowM50dnZSXugW3Nl71iU+/OrL+f7cXjLtg3zRMNMRc

haT0KBasOgHvMCzd/y9pmhMartN9nkpO1FrWXDr+OB
Gm4zuuk4z3TyUUCDU4EPiDWMOu+eHPoFuNp3TlhilGuKBZ0rvcr3BTyGx9nYACfNxduSjfOKd9uYVdUZ

j/kCLTACKZP5jS6uz7X2pOS6kV7ZE3M2cwKD1zHINi+eEf7d46Z9GyP7Vj4CDaU6xON1P/ayOtkt4rXt

oT7nQvCHOamJRlMpHvCN64Hee+Kq9k+4xAqYsym3bm
s1vUiJAAuTJF735dSQfjr/WAAyiHFIb36zagXN0dOZ9gH8WJbk43fpB7hNy4xzRXNPe9SNIIrTGfsUFC

MM81X9cm/F4opH7Xr6jtAzRYfrA2s95UMgL6uCF55CECuTWnllyatY+0YOvVNGyZMRiwwlYa9Gu+wd4I

Sgv68js1y/wheLaXeJ+j4LLu0xW7xXBjALRPLCfmsh
ZalBoPJB3qCVQDE8wytj8zuEN1/+90K/fXMhV9iBH5qCCOiLjY+0UtIOb8lhPIjadow8LUnMoyCxIr2y

fFJTlFGX6iCyyhmZieJAwxsIi4fdXZSGflpT0waw+Ucg7stgWLQ1L8s8YlIecvSxntNa4WqqMk1lPVBU

8yVzbWxrhTW3hbXRl7FVcFQR03Vw6PhpYg5HC3zRMA
BMfj2AWlznOyxz2q184HSVEhkrvLQUN/174jG1GrYIwT8FMcWkS8IC+ukmYuPzy/EbBEmasjebf6CN7a

2xyMr4aPqHs+Oheop8d7X0BzutQlzWSgyf6PkbNO5AsH3On1RVlWIyeozYr13en1rNaxG80c6YvxO832

yaAvzBWBws0F8znzzNOcM75fEnb4L3waFEJkjW/Usz
FzxiDrcOee+J5hoIwYee6Z9aS92JN6+Q9otIcOkCXwJSs+nN0oNujgLptaBiyOLkZij8FcI5MWvMB/5R

buJ9pnsImSVpATte2GB5wQjPgJIO1R4Hy3qHHG0nKkCwirUYHRtPUqqeUUqs9Yjvv+lSK37nzEwlCvco

35a/zlBtbZlzVNYDzqUszolqGmk7VZeyp85ydsR29t
TP4RSs69BMEO4+9Mf1bwLgTYvL0ZT0+y0uI3MqheZdM1ygd1pctWFf+ywnOtH+3MHHIVKXB3VQItjuiR

+NcAR0GZZvXIWf9Ksoj0HLDL7U5QFRnxIl+SV5uoMAYbkEgFUxZowFEReSGf+EsjI2QRXkHYNw2uRoWV

lAY0VbmEco3+m+HHb2yBMLw/71JQGrDzBxtU/KUu26
Gi+UPJ4Y0JB1uwhvE8A8oqbv/2XrtuAkDUpPF2mPxKkt+Shvm2UWz10gsvq0XHgLloktwYgtWbWmLqUV

oFpquQp2A6K6omsdxO25YJTT9xSIpUBQBMPRqCNUyw7p6Lmbn8YueTpIbBCMjBo/lUGp4t/UIzeX9kqT

SRwcZ2QsiYg2M/+XXH3+2Aznwod2we4NiXPtK9mpSR
PL1YB0HFiX3Y9Mf3JUEcTK4yALyrQiJirTwluoXncYBQz2OKDHXJOnDGdFQH2yvCWCQgyDBLa83HHJUr

k+yg/bf6lW48qohueEia11xPr0n0M/0l3e8lbHuczcfaPrQxlt7yxnMO2AMsV0OBHx4INOBN1+luyyYq

QCIHowqV+gxfK/efve/nNczwLrgo97MwysgSQaztpv
yLdZDNM/n1kSc6ssft8uRupRaSr/qPpnP7skLpzEnKTqTprsrM5P4FHo/+xYPNc5Gy8xb5/xQmzU/tF4

5hTeI2JZOZ5/umMrS5vk/nZP2aP3akMdl8DJGhWnWAMPF2Gs+FtrpjyhlMLvFX4TTtveEsMqyNznanA5

q9qzuEwLiR7bwt0I/Ef+N7smWNkEQu/vyG8oiZjXAa
9cJoCJl0IfcTaKbJil18tWLFvCMfC6w1RFQ8sUHXGU5d+kvpXKDMasJIWV3ykjb/NpR7wjNCorJ8JaSu

0PUzz5FoaV7cUQREGgIB0Z2hCO/5sDnlk4qzWHyf3a2oC8Pk+E1IB3rFX7EB1nU31CVnKRS64W5SxBAX

esIk1PxxG426B8Sk3xKXrap4R9/0LRxGd17AzA+/dU
k0Hk82IKidXBzbbONz7A4lc9SdnejZna6E6a9sH/hCwlDEpUxf/d6zpR4HMqQwdKBAnubyWiAomofs+1

qoQoScEBGsoe3/1ObnTLgYsowSfpLLJWWvFyqdH2+tcEgaq5d6qKShuQ2Pb2j5XixyUFi3AMM+9vmDbX

aJ12NYlbQ9OfDEDoiUBUjwY/dIP6hI2qf7gKFCyUMA
optDM5sqcsp+AM8lrPza0BE9mNXKJ70y0BPYuStXCZpu+3DMu2AUXbFPZSGzceYX75Gc0SexfeOQtaNU

IQPUnvO0SfYRL/kEeUdwpeBcDrANnzGnrjOxNjlUUuUdpULYGxEow1bO+JZBjUlOSvRvPKW4x5G5puZE

8dRJu45pZJmYH6YZCiBOTxP1tQ9PP1AhQK9a1eHdCA
33Fht0cz/o1pUXfGgC9MNSMrecGZFTA7Oq2TO3+vek2nLQa8sv+PXvves12qaALFA1lXhisrUlk8zNy8

LVhN+MuxjMF7lp1xukNoPwbVCJ0qVZv3+Or4KWJCG9vTRIfBOnPV1LH3lOWPuNlPygAv+5y62UgtGSIE

1JzkwOOWDXUR/eZgiQx7yCxVPtkUt9/3EE4LL/vfnX
eUA/K88MlljBkSiDv+lhk6ueWu6mkFjoUzsS4d7QBZz++1A6dRodx3L4yfv5GZ0O80TdH3xawUOOjVCw

bj4+UkS0Uak4LfGHyTbEGR95gpE9wrDHJ4r5x/efN/0FVupPiZ0Myq2R3Aly2hnjH6PoV2zE08HuoxNg

rgsZTuah4FhqqRIKVVetzW+c/tBv+x4XlO6xWOQUPu
rnnzIvMM1tNYh5ERkdA6k1bZq3P2Tp0RsTnaXnWHIcV00ywx0CVUcHd3JdDr6/hBZ6RaKyzNDsel3RiO

ndAcVvyrthNSozyxReZwUQGbKjjK02UnyQFhuNycQv0xxQKo0hWXRrPrqL81VZspoeqkm6MmmPLciyzC

64BPSddImPquBFKe8n5Gvj4Jo0k9GycoA7BpjLbrwi
ZOqQvgL+GqZZgA4Qztgemo3hMOARIt1Y1gk5Lqj51tQQ/vyNGcav2qVfjvOYFU8a0vRkfULd/oZ8Eomr

wVatZwq1uttVzCc576fGLNg53ViJK/u6/JSmkfuUqtDsknvdyZAK2EvoucVnaExPkmjiBFOysTPc2oF4

C7paWHKZSKsoHwzepnAfaFj+50HFSrkkSSqizeR10P
oDahHm3JmuyvL1SOtvG5Eqhh7MPnrTGF3VEGGnOkmVbJ5v3pYc+k/g+OVGr9VuFt0oOXXawQc5EWYNy1

rUakIu5IehGbY4CIa/tcFlLv4Ukuy6jntyMhhdx19RptLxWN7nIv6vIrs1pCo6RTHxs+lOqoi8jP3c9N

51uNodxWmZKhP2uRuUS89C/KzBg7fkmkyhJcf+USsI
IeRrrNv5QB+v2MWNr3v94Yqmis0BH7Feizrq8clly2rbe9ryPrYbNwf1ZnJyEGKoSGfI/NhhA7CKHGh8

kA1tm2ZKXxaTU02EfrsL/tUgAFHevBiHUj0r9uhWyxNdYwyD5blw63oc/LlcunmqVhggMeK6T7ehE5lY

jZ2xu7G1aXq4qbf+8paykJXe/fpoQEa5heckLt6dgk
dOzgh2nEBWhjrVZzWAzNQRnZmG3LSQGmBAprgunGjbsgeCSHLxbnEJ1uh2e6mOV5hdQ+OBDLbqn1zoBQ

BUy7+dt9LWs3CtI9zh/s1Rx47ouO9/3HC6FKyB/0Dg1Zfbv09lUMP1MUoTkNbqzLYweiv9/PI/ywqsH+

K2wrDwPlQa5FYR3TQ/aI7bJsenh9bLc4NHKmwIZrrU
fs++zsLXZV6G/BLTip98NobffxKZS+5jgLTOFuIAexv7Q2bSwRpi/3ktLG9eUue69IWvVSkRLFkAWIds

0izg5bkdRq0blh5pf7RnAr1TeJaUpCwd2yFZsmX5wDQmhO+e46Ji/+GyKz5BxMuBnBQPxFR//7LiQfO9

K51bsNzUf+lhbFpeGxgQt3BfZE9mbvdlEGstdQal07
Wma5p2hBy8uLubELmXzvMU00c2lPzms8wHLGBWXAtRyX4MpD8tBSiYSRGECND8J2tWTB6nwVJyBzvzXx

DhAA7DhyBXZSyRBh4j93ajylERrK074lx+2IUoR1RSt0A9BtUkYsl13hboEW2dSsmxZvMTnViAnLC+Mills

bBooaa8iv4XfTtu3gE9A8DXRzaoGp5pd/wJEsUQXA5
B6EYeQeyrB8OA4XNVU/PwBiTMj8oR/X02xB+NgpKkmDogqZskTdL4gPeKOKX9bckxNEm5rpgRoqE+cns

0GDeS8DwG/7JEuTAAT2rO2Vwt/H0mcrbV6RuabOMHtMWzNvAYZS8vrFLbQ0RtARiNg43s6gkFXoeFnYS

9uQrI5jakYfehR+C6f0qQHXirkTk9abqkxq2VpI8uB
UyjOx8fhZVVJEI2qDuxKay6BRng0KF+zLlnnCrRb1iXdaslLxwGiN9YDvcNZwJ2/LFv6hxl1/NWXQzA/

3uskr2/DihOZZhuaw2oZ8imvp0/f7dY7az6xBDMBNuUB5/dPFL8YnpG0D0OO8nmw4cLg6qjbp/9oPyeR

wI5p8tDVIwuYIyOMBs13NYruY+jzY8+mjBl4xtFRDR
biTm/FkoVftebpdackaxc9cmXIHovgxyJregKUQdDKwrBwJkixxV5oXzr+fKUCH15DuLyPOk/eVlfExb

KURQMdHrUq473s4WYwajNZQMGMuKGh9XILDU7y2yPetiErsufRZWbMx2IAgVbYECCzgnQ08mSq3A3C5I

kJ9mivTxEe2xERp23F9Q/qSaCtDFs9omtPw+j6pYMM
6gSdmk0PpM6UXQjFK/YrcXHsI+c7JnC0V8MPY3WxAt/1zoFM1H8yYBqWWzGuwjZr5JtQtAudilXsqmpM

gamy7hE6fXmGAh1qKXZrbtpzoYrjT8MIDBP60x4OGBctKexL7G/lV1gVCudcQLV+thRS+7KjfYR85kYT

WtD4Ma4J4aKEsXczsLo31wTQqlnU0UvVp3eQHgjbya
BxIdy7Rwb8TljfIVFn/ZuH3siljctqIWwbrmb0KP5Fvl3XDlJEb8jAY2h/VfzBFDacWkZyAvRsLKcfzv

mYr1yLV87JyP33/xabwsI38y1+d0c2g5x9NIZo94PTpPDl7yZe5GBJ0d7w0stfbRcwSgyB13UJDlzzji

/A120q5crTHEmBdEWJwIY5RAgVdDlzxvoj7mqq7+vI
UMezWTRKO+BO5dwOjOF3icXBV4Cq03Iwi4EeYFM7asJUvzez+bUuSUMkyBukNFITGtYE1I7ylk2i4sxV

iyG83Z2cp8MinTY4VUzdbIf0kSWRU5Dc0D7R6hP+FXoJlEVhSx8ANaHqoyXughbHKc8Wz4TPt6Z+QemU

Zgnstuda3Bt2nWQ6MjU+RIeeOg9wAt42N3SPswxvw7
D/6Br/fVU7Aai9sGwqDXj/Zbpfg+F1bkLkYLTEeVJ/tGFwfqdLkn5pS5FNeNJEox2RPku9hE6sWE67MX

FWl4i/dJgdxTpjIwAwxVXnrjp4He0iRLm81DedYtT6eVAajFhykAk+fZaToyushNkAEvbhEjhaBSea0C

oQLS7WJAuLDAIecI7cW0Tylo8rhphiz3USfNh709yZ
l8oRKEErKLV1SKu2chVHMXmQYZkw40QdTyfk7mVvYca5fhS/avrNbIMfD1p3ynUiKitTCCE7AJV9f+YO

RWpCBEtkkO8IOhNc44IvyDGRyuPGyIaRffGr/ZErOBqZVm83urUguOgDsJpvV7Lb4Xjo+WI6EjgFEo0U

ONp88q3UtlMHEufzuFTspmlyvJT3hChoBvaMTtHSjs
inLuwxJmzzpdhn9vzr0bKcACiko8VSTtOro8HzDM6ChATlsik/D6ZKPMgkTzrwuQPXdr8ev5kJBmmZQW

5XwIWN6SOQrTI9HpmCkcwMnh5R80bzD5b75520C9635vZhtdntpYdz9QU1PNT8JlzyEjlYR4V+Wg4Qmc

cL42VlVWFbXMQWpHGxW68MhPtv7GMKXyhh8eRF+awy
xHm0CsiVAGuNr3Pgq5oy6IpUASiW9knaqedsGAypGzeR67q1WG2upDYM9Ge0ye368h5qRha5WAoqvdkN

TTIjlREu7Cui/BEbUStUg0qcov6iKMDocFQZQXxoyBn+28Ojbza/ZyHTwKi7IKYnql5R3MI7POa/A1xU

eBdz1KX38YrKBWhf1XNrLpUE+4tBI6kmv7QBCXq6Un
aBEc/RmaajfurOBWqOdVeK/rb09uDKBrCClGtOQoBHII0uCWkZy67jSPdUy0MLztA1kDiYu9CFoxKxma

LmXFPsSZu7x+PTwlK4gZr/s9UfFjOtXycs2bHzlJjM53D0neu6063aXK61vHPEdg1auSkCdomew6DpDy

dCHxjxJAxKSKhj2piQsZ7zZkaaO4qg/w79etPvucf2
DEMa9EARj+7ZT2sPYEe0zqCSnyl9yNa3twRLQivzloakbL9f82xAsZqNcmBQeEdWkC3hDoaKIpFH/xHH

rLxXe8a42wDkkemtRDAdQbFWkEyNmzNPXKVGuytgloVFjepQkFZHr9F+fngXr+rBtgqz2CkMRwZs4jjj

BbZYDFjPqY6JnJYM2V1h7NqRrhNWYQPuozeIS8qL9t
ion8NQZmjpbrVnm1ag5tfRK0rLAjjr/fjMtezwxbUhjt9IZRhR7Yrj1PxXXbutdubQ5vWm28IjJLt/0j

7NV7pNLqTJj9oBy0/S4CEB1fht1E8ywJJZxHNwK5V6R0WhFEHQPrxCa57merary9vZK1qukmfoZoa9N6

m0garnJl6doyfi8DDggP1fcdiMl9/wh/usIsFOtm7Z
Kdk126qkQHmGujc1TNr/WcdIhFxJT3u304HLJZT1o/Qzy8qiAD3YxuYU9qVU45UicJ9S5ikuATlttQnl

jfL0+1fxPGyhU3BIs9IPXwFGc639eNwS/kS7AXke0+qG8eLh1RJqcnHoFIAvptVMLOxTsGXqGk5Px/0H

Y887PxLoO2gLsKMkzpiKZd7kCDf8nxFIwh4DPYsZVv
piUbRD3bgngXtP9I2Q6ZStKpYIEnhas6uksLmy+kh7ps7Y5biQkbupOWCK/GWpJtwv5pjXqfBOIfZWm1

yd/Uv+I5OZM2Mi87OFyp9UllpaRzJC/8np6v+gWOZtdXninTD9E1btioxhI0IlRwOq0X+86anu3dTePM

ZxeC1VNB3DUmtGqd/3d0r34u5XvxiGZymW2+FdZx6c
7+O6eGUCwGTEh7gpej/vUx7Vuzw1nNKooaxVLMZ+L/4v/r+RtLn8WDttkRXQABGLy57v95i2Lj+/aneP

I7/R5AfN9N/alUFS5dZ2+28z2Ve6Z21/zFphm3GC+CH06I/SmjzGF4E2hGD1zGGmr+RIWIxm60l7H6aC

19WW176anhdtZHkhel2h9wVrrN1HG9BJCZDZIFb/cU
rDDCHHafyqQa9/svOwZj7zD7Nsw55RSK3jfgEpuCZbyeKPGBkwMvCrphYDxuOe7jsIwjy6QjxOCOoYSb

hs4rlr8/vJSO+89vhjO7iHg1c1nEcgiNcKUnWn3bTure51S2unTLatcQjK9dnUffQQTJyz7mnVm4PNG4

t3glFsft8B9cCG1x2J4mjgOQ8y9Ehc5mvS2Dv/qUTk
IfWSly+lIymh5jKt3U5WH21u3w6PvGw8H3KuPil7DDpoVP+l2rrqd0B8RZGv0QK25hU8EuUWMgIO0Xqz

BEke1aRGOoJBxZMa0AsSa7UoThl0yBfmu99hDDtmAOHfntXjC+B7Z063+CWNkFWL0c92jatSTRQbDl00

Z/qmfbvSknk3YebulHm3YJiruiIT2ky07qsjv8932Y
w2iVzwgHAZZ6ANPBMChIEXS7dcLUfT4ghJUpUES7fBU3rPK+tboYhPCC9ESvKp1qFluHQqEEq5JQg7lk

6KNkH3iQUbxigdpzGdiZZZHK0bh0+AAB4LJqdye3JipfeN3vzZKM8ockmuBYPYf+WjyQxQ9+7juCEWyu

OSn+y5ByatCdZh5VV4H5MtGhZxQ8bVHah7hzZTSvcw
65n+KZ7mlyXIrrQI8PAdzcZkTmU8ODmgyCajdohnxczShbNLXowMsfySwWhwkspr/rBJXcKVVnvEPlyn

sEnMOrd5O61hzkq9hOaxsMcteRFchS/0S8yM49mApIzJRlESRvzhmapBtBvgx4+OY2nXMyBTlmL4+cvo

n3xPL/YQuQ9SFoPawnvLv8gHlFjNOqPyTIy1z0j6hD
NQ5SVc9KtadOYda+HjUsnYoJ6onaKLf3qMpgBKxHjgh1pGmM4s4r+h5TBBmPy1+RrEywDy2EC0hNEQ+h

AVm9Rf1q8CmvR4Qmtl4yjCTT5xKc6PNe+c5Hpba2VwicyMGOy+GqOa+LVZ87i/MuTdCJIEzO/bNSl9qA

Tidb+48gekETqwJgx69rLrIw0FtbUmBhV1VG47Bp2M
61tJFKwrn+pzF/NM2rZl+9MjonF/f0g1jF8BmIaHrlu6M4f34Ds2Jtfc0F2hk12OciJr0vhgF1HIqsla

0NTxSXV+Y/Qa9oFUZ9rlM25odONgomTuv/zkYiGJoNrwSYNJRcRaAYL1omn/nc171jTxFJWGkyg0HwPx

bS4CYiu8MfglVTysBWsCOX2p/tDkSBNojzrkxPWI+d
JTUBtCTZIiPhy80THvv31WH8p7OEYW27jBJRtHN7nzL1Xv0d9sEVwqgP3t1u8y/MN/CirIe3lzbjAz12

474NYWdFzIo/SkYzdCME4Utzlzz+mgRfxpjrcsP91gDqjUHeFKL1SVSvQ2Wk+ctEwuPghLmryPpX1u7e

3M+/yBgjY1MNfjBcWdwqMq3pm/NT4SCmPtaQ974c3A
HE7SzUkbc0QuHQLFXcl97dIKf59hpKJIHLvXplpGdDZcf0so34s7rDZwygicNeqFVU+CRK5EYt7Oi4lN

vAvp8Fl3v2UMFDDN2p5hO222BwgMBBWqgOydcMUFvZ0r15+7F72maHFiDadiOFGYbbr+D00qF0DVNjZq

2YoK/v9zghsLpe9W1TjW9JbBXaOwyWzqUIbasC96Do
D2X38IgfUP2FrcHnacEXQJLV3/cNF4emZEyHmR6/oP7YSQrpT9dM8BUZ/lbkiVE99iJHbRHoAfXv/ipd

G8w7mwXj7f5i6DVls3Uf/dHvED5S3ZVxiP6OoB48l12ry0prK8k0er6nNzqemavXZ5X5g5A8u29yGD9y

NbjlvXudXXnELtxJg5sO0QaJWVCCSqloVo/keuZwDO
Ny3rINiKI+Uo+M4oWGxSOZjFZS35arrdrvy/6kxAwNPnztcDCbfDYarm+Nb8RdnX+mGnjxqeOrkdK2tx

AowWZdzVaAtcoTZSjGTWGoDm+4AIPHdivdkJ8WV2BPx5bD4c/IiNJoq97FlFPc5B/oknVtjCnjbXujKC

4Qusf7r4VplMY3r2vUohkGN8N71pUKtZcMwmQgkvT+
Z3xfIPFtVI5r/UkTonNAjxfXbTxIZbSvic98f1vZD9aVAYuNpzrNEykuusSbck67d13HodHwFyrTKYzI

6IUgN8yxRjpzeuJuhQ3j7mAs8PH+MrfbMeOxzdF8kD6FmQUeTH3eIjHQ+Aw1DzluDR+GWO7mS6FQnrVc

eGYiJGlezlFR0HMsz07aB/tvOjfNBIS6jpCEcDhspS
DmgsYIbC9Mf0BKh8V4OdmmhBNB7QyqwTERtUeccvP7fvi7k5wXMy1MlHlVW6BeOgBKrEBGYHsFM895E4

b4Qk52N+WbH2KXgPOErblF0sP+1aWfBelpVnFFuFC6Hbtg2MK0JgAdlB/BUrfQDreM4CHAd4U+MFLguZ

thkptg4MtZGBo7q0rTk2BpYliMvirfw5qWTOCsZRK8
Em7DvPlvt87eDuAcZFBqRT7h/O3HAnAx5ulrSvicMu1uDr731DMUUHNmEQJrkDBxF25Wq1waeEHrAdlO

e9Yjcjv5DFRd5O5EcB+uQqrnKxbzTFF7nUp7k8Fla3f/rGESQ4AYf3r/rRh/i71kTUV0XGKVvkIcdIe5

60+wjJscZ9KG7A48VQiT1PWnrgjHZYn6oraCx2LYiG
f3YvDloWdPUjK1UTT91pvJ5pugDHkQ9bld7YArtyImADeaY0d1cc/0r5xbBP4jDw86RIYgaaUm9CbMgP

/3ORs1cYp96kywLJG0BsVb8sI84S56VZ4FmRyR/wgNIkoxQfo42/AV8Cncjp07f9LpYaSg+l7cTRknHH

1rdCSX0ecw7gdLPv4038W/aWCt6eb0DRh7z0UoqTrJ
sAV018azu7oplebGQL8pq8qTEB40pKrbZmKr7D3ktoFF6cBJPlYmCBbQT75lw0NwUA6//VGbg+MVydR0

zZ2iMjs8b/sqWVTDY950ILICaa0IvYKi5Ph9pS9YMKgLRAZwsZH1J1Q4y7K2eNWrgN2D1xnMZpJnMrll

UwJe3R5SO8qIPION9HEuKjFt5N8n5ZNWWfiEOSLhAV
MdyCN7yH+4R/bhKPP1Kkrm55UFCPZwKk8unuiMeC3oNgOCRB0OXoEMiKDsJV5LHCYp0Iklzyy6zss8tZ

t435I2/uvHadEF4aKMKVZa+J2iM3WJ7GWt2UjVHK0hbljlNVOJ2NKnBP/5bRKd8iqZ69p1Yk4Fv5mPiz

ai2YcMuBeKPl+RKSagXXMsoLi7SII4NIfaI2MzUveC
kZuQ8CISsmCwYMX86S+h2Rxel5MXKhPzgM5Jb1djDx/9nipRykjIMVal9/7yebGkIIC6nxIOcRRmsboV

/2YGxQ397SX00T4soqU3bGJWV8QTlAAYkk6UZM/KMe2KFVoJNdli76I21TwHo5qBo+59unRR/c1JgF85

LWFBcGtmkZfYQA1joN2yQO5nkLHJKTciJYY1v4ZSB1
QSpjYHBmfd3nfKbKp9dFTz7licyXc4sy6P1HLrOStuRVz7BlDkKzUA2bGP59bwEIXCxC4/Sahe23cTk8

Dg84MSi3wJGjSX4qCi4KLXfU6vfRYR6J9Hx5WgFP/x2Q9xzwELUvyM95Mz3vqJusn3QxG/Ii7Pedzr4F

REO7i2j+DLmm3Xjr6fB7CcY+eUSQ6SvX5sdE0vXTli
+xiEfqUhBdO8zWkPRADpnbVPC1qCyWc+C4Cuh7zafahQPlo8kXqMc012lTxVolF8Kehfs8gNq9GbEZBk

5kOWkbmOyinOE9OMQXzANScSXUVgto2G1I92P6Zr2mt+7kxnOtxHJ68qHI1/mLbggGeTvtESvyUZhMV2

cdprWwuiD9jVF/jCh9mN2/0ZxVs2xq91jlSIJ9IOYX
FV0I7e/PedYSEMRiWt/x/ZGZRNHDuoOEogB4+UB31i2YC6p0m0FPkazB2pP8jO49JsKCKJ5tGdAGFdt0

S4441vdVQc/5uQN/qGHpEokq3d8ry0LHZk3/JfR2trs5nibx/+PBbmk+1miyMEGFraDFxw83ix8yApYe

EV3aDDGAW0cItoxqatvmVUyuaHBBTO/lWX82jamTBx
7EgO8t9mcrOXio9NMfzqNa5vmbopCob+C4jMZrth7RzIwVkQy6vbYKOO7B8tdj87hKT/tkXp99hED56D

xhWackXIUTMyrFUxgD3HYQBGk13iJRb8ci67VfYS5NLszzQrDR5KqcdsWd7OvITzNV48QPaMGpSsE5E7

ubBPUHDZ+Jv+Ow98mK0GYHfqvBiNrGD41I9mt8lviJ
LIMxZKD/rVlRhnCokRBK5kue/sMDEv8+6PMufm8ePsv87sqPyycI/8zKP9p2gzQPQ+mJnske0a8RzqIX

HYu+6I+DYy65jD4qieLU262+SfTXdTlri+r14eKpHZJpb15JX4tTDf1kgw59oefdMpeFiZz60Fq4e6NW

NTXYfXcJnZo14In01uyH3zxKeT0JczCVkoJniYzYWv
sWHm0nR2SAt4e2EvYfulbMjiKMBf05pB4c9+6IHQfvh0CWYv3TAZK9kkGlluj1WtIFBgQJI/3tNWobil

iyY4h5TmRzv/1Vq4/VgxmP7t/kKMnzGYrIu7mWMtKv+vpPxQ3wN4olX5/KttXl7585Hzf+klDEX0JrQj

HMMeBDPmZTRrfilXWShpQ5ck8RyHJceQcK8Pd8tRdd
UIbDqGIlP3Q9TSpQX+JyZWuF4J8UFMPN6NAc7dwwwd27MrLHazuuIlz7PVYsQkswirrw+tpIXXBnbq7l

cTqmDgsK4wrn0q/Al3KYzxBMl94/hck3zzP347Rq9o6Ypcg6jdydDhy8xIbl+pyGFV2LPxzFnYq8L1mo

uLNs68Qvlz72iH2HavyDtKPTXKMj/QGWceXYEWyq22
DZy/y8BggF0bnL2PWkVCCDuBTI+ngknkB2voQ/cr57ysKvtEVfpTNcZkOk1GmU9Q6gu/2TREhZKs9SBP

rIZNN6VIQw0jfRDljFe5r8mUYk6poXboimBPzAlT5Wld+xqIHjFhsGK0ZXbXfkKY9u9Zi+xwrTow2SXn

JsQXOhVDvC9Nao2AopvJHk8CWIkplnogdrOh0ccVBS
FhbsVSrFf4MeRToe+LUykqjTHzMh9/lbhmriVZfJofp6aIcPMkFrdBbvoC6whZtBDXQ5LotU2Vb//6H+

M9HmsO65lJ93Kq7ETGZ8CQsRtsNj9GEVrImrVUyDj9ltf7NILd4AXm7nbUqaEATHlM8GhIZXumLnl8dr

bBqqBjb93SpvApl40Ihp6xlwc8uy3rbz6uM/wTwNY4
PyKiaI9usk+l1gxgwypYhbRwS0Uyrjk3WFyGo7t4CEZldga8uT3H7TU2TEAAwR4PEk88bDmwjaojyFWU

CayOc1BaLeb5hmLwLYdAsrfsW3QUqIAjWmkL6C0KXjmSw5H1iNMdg1ZOm/hAoUvfKClKTH2xWP1mWVJP

Bz6I0tWRYg0067If+9li078KcKnvMTbnmJvh3Gnz7O
tFEuft8w8WiXzsYn116IDtNkUjipH7uMnlAJSU64hE1FBmFlgMwTM+eN749HV1FwktNpFYMxbaHMbfuJ

s60UEWZdb/Pplb4mb9y7zo6M1h6UGGzRfYgtu9pw/6pTrS2w5kfY884MwH4Kco/ug6TvYe1vAPysvx2m

c7l8GQxDkU8naaTploHT1f0QGnAStkdQWaiOgfW2Bz
+fHlBb8oErmnP8fy/oWbrVFVFRDCg1ODJLGWs7OXAnmHB0dlY2zTCFinufEsFZqfwYTpJV4T4fAPC1+r

xA0vNTzUG39+zyYoXz3jlP2Yw5Touga/S89xd56yuaBX53m2nSWlW1tPtt/pdfe5g9n83x2vN1OA4I+5

X8TLJ60Mf3Hbfxvoew0hXldpFkvp1Id12jV7Gco9iI
HYB4S85cHjHB9icuXEhQx8c5BJ3H5WwkYpfl3roQRhsTP6bi4Ty84GAc63fcIOKrT5poj6ddSxHnX2rx

oDCs9Umb77ymCQepBvWXemV2nixCakSaUNFMqhgd0tFGtdM3gibYteOdBVi2PcjRr/Cb2mVO8lwqcemJ

Ufke4OSBXD4+jEH5uLKzIiYunTBk41tm/lclunYM49
s3cwRqneFBg0/Ot28pvMX+tDSalPJo4JcFoLX5XZeezkNVu86nsJHRyzeHemSBRh0CJ5DSBc7D9WZXFB

8Qyp+f7mF+KLiV8Gv30bIbAAs//sv7J/bJus3UBUBZy4O1bv5BKMQhGoaNH8g/krD1PX+5D4b1bvoxa0

pHcVk64k+ZznYSohGp1bJLoqkxwqyRXsaHAOqoA+JV
w1kbA6E6Wzp6Cc7vBda57qU+Gfo8Q/HDaPKXvJwWrvzgVGL/YdnzHmGDCdedv1i7LtPuo2ZFC9+IPULF

xH/1Ia40GpUHQy+FmvMIbpfxitc6ybR2rAqwp6ULhXqTf75Nuwu7ZW4dyP86coOILqA8TZapLeX5Aiu8

I92dTlA2QfhUPsphE7KHBNXq1ayVXwEpFlsHQ0WU5u
W+3I3jBU/QefwFabgKXKy0H0yVY1e567qb2iiv8YDxP2f4qa2NabWdv5DhjTzxuGa8I6lt579P1JHztM

qd0zAr2YdTy+lT780LMNUApulLLi/cub0D9Dvwhorqoch/ZyQjfRNTm3pBh9NhIcRrMUx6ViyBNCidWl

9S59CxqxhCVv2UuhGtBVW+kecxlYKeH3i3bDiIJSch
BalmI6p8/tmPEnf8lmA9MmZ9j1jlv2qwum6/t3PPWV7P+xRM73t7yue/oZjhYkfsZd+nDxr2bmZIlx3m

fO+DPy2NNBppGHWDjtdH+2UFkHm5qGBSdd2hIpMuhfRCEjxMIrsNsC+/tivIxINKzGHOF1r6ls0JFf+f

0LGqYDlN9Lt7IXkZtIQ5cZrHUMZ93MrIz2+862mmeF
KjUzegsms2t6B+4h7xxcJus9pyKYyrelDVu4mWVw1De4jx88Wk4k77P9w6EGLCS2jMoMnxaiYdX2hpVf

9g2zbAOIczvcqCrG7Uj1eytTNHAOqsnbC/xs2SctkBCxzZDYATZkIka8pU1t9oP656CsBnM0ACGWpRma

tYK79YBZrD+/MaWt+Xgi1g/SR6o/fn6Do9E9hdnZna
i/QPMudbb/O+ROU80drz8q7KzzMe2231XtvUDJH71WgKp7+ML3j0dxV2a5ATmUUyOS8EIVp0V4Dp1P4D

efmJLMKj/0NGYJxJntLtuCL48wITc543ZYz2jqlm+KW9HRovYzkcWScNMWx86JFM+yXlmWXPpw7iEFvq

5K/HhAflSDfR/RZG66PbnyPvi5QWUUf7ya4dFUcULD
wHzI50L5gtfLX+unxoms85BblpqCqvgi+ZG+rwn4+kSt8U8REefZx44XbP2SIXi9L1Q38UNcpakg73r7

1ysisU/CKvWEaDoQ5lmv984jDvMXhB5Y2m4vCStdOCRp6iCmjG2umHRkn3bxDRXrrqhER0yjYwyYk271

TE3drdOILrVVpLno2UboxtyioSyZPjvk5syBo/Y7Ip
RVpCXxd1bGOs6rzlNX0Jd8oHqyA0tsEb9eZVPKGmE2XZCL/Esa7G0knOSLJCnxG1eiTo8MxuX/C9lv1Y

pyhtKxV43riXfygjMKijYbAwo/lvx0sMgDtHrvuSNZ+Gz2WhuTn5eIBKUmbjsCfMyHCxkLl6ALwrj82w

0AjZAWo6fqErzzaNUo1N1vKnWfhFtYLlrOeqmrX3fe
uK05THytxqznnE0HF7CH6LnS0DKa+pEgFGOfui5TpACxBiIW/5RXa6K4okKqxbCCn+nH88KljlpMX4hv

27/8KCyRoT1iVrfrhequ2bS9/PHDE+PZqYj335IM0sbXOL9XUrXErd7rsCW0eK3oUOoEe58BeCTBhLUp

BLhUK2iJvlXKpTjjLATrzSgiTTwOfWFr4ivj8ebnkE
AN2zbj2VqWQ6jWtzB88luqOqijwpLBE9jemAkwqvS1b2SRWLllRR6EG+BPsiw6bb0Kk4zSZ6jnbsjH5+

peu9xF+YGhgtdFY5jEol4yxfiiNdxTwxszGBtkTxQggGsALsn4m2AfgQ2bD22pNe8FZkSfR7eVuJa7lz

vI47tkyb/C97MOnuxJF8gcI5asxQq/2HEGo7uoP1lg
F/ulPL2nkT4I5yWyyxYol45Y8TnB60ODjkWHx8luZlZP/fAVRg/kB8Vd6o1Zv/Rg2Kku0MLrZ0ZXwKuQ

DL9Qp6WYxv8tYaX64Uhab21vEBFQ3psYQIaBInLmWhQaf6gTr7oNZm+06A6DfLxYmLTI0Z5KIl7Uj7ev

/oZymKuSlifHqtq8jkVYkA4EvZb9AyNOTzMDQFX3cZ
sZ3PN1yOxFqxs3GrMxAQewhadkJ02XM+jqljr18pnxPiG2Y7Oc7Dx0+gh6ULkMJNceys/wH/Py690CcV

ZYapS6qz5mbz1lqc0YkgEsa/huw9vQ32Q+fPb8jflMcOoipZbTByKAc+J5tI5ePVeDXPhHtu4NoTGRha

EWTGzPKSLn7SpuI6zBQuHQF7iNKlvtct44ZGMlW+NZ
hUi5F6gVen1PyI/JdIKMWI5u2WgZupZ818FAjVhtyIl45odjhFy+weMFTa+At+kcu8d5KDJ00tp3md+4

4CQGCqxn5D2PGjYFWqBQNqQg7W088GYx4a1Olkc+UtaQdwfrelXd/UEaZOnAe0PVqCd0pMGlZty8oaJd

HxoqSY4DLyX1gyrcD5CIcMPJ+vBtzTlfuRqNG62288
u7V9DvaMaPKQxnqhVTHQcST63s9ZN/rmthW75HlaZOBqplPbrYtikKZjgMplMh50gtQRrP5iL0yNMNzH

kQ1hC5+WWzOO+jYKFPZdMRPj9RkoqvMc7TBHrNsq8o+GB/upRvJHFvs9GGWtWyfxFbbLP7nbGgUkXv6v

jogpMNU8AnxbfR0NWd/pb7WrGJ+5aUZqzjGE/OLEVC
/mPwILPW0cl9Osjt2yDW4CWqc9iSuNlhQ8phu66YURnYY54i98Ia02lWdc3/yHWlvMrJ3uOM9tWc2+TU

OWLWgdLhhmPEmfue7WzjjEv7fJUDwJtoAMYTV1svsh18AeRkUPtQrDdZwlfCBlRWiTFhEvJju0FZjLw2

GgUrqyp0omYtvuud2Y/Et0TA6eXd2b7k+0yEIK0UmM
yLT1FxdkBtOwj8U78+guaRHUmdpRK/xFAN/v8keK5mSYfv58+ziEqYZUIDg35GDG05+UbL7rkEKq3H3j

+wu4GApn22Zo/g6Y1DsRKCmg80l8Fs9EiEN1CIwPs3TLZn8rWC4G513P53V66VQV9szH9abaI2CZYAlQ

d1PldRMk6XuPDyfuWTcA+IEYIczwt3hbuGgdeZtQWy
TsasN/NOS35oLZKxJcWY1lHy9NVuN+8k6zEzPFlW/Jz3ADo0nYqJw51SCMVjmOrzm75s1i3rh6VhAPyG

l9MZRlkUtALWiDPT+/DK/ZYMIc8uctDVQlMD08oyRGB0i9fFB4pMiBPc/HiqhG7BlucILp1esm5/9Fpm

zbQmTPqdv7FmcO1gAu2AvrgilnWhi1eYVHqv4kZSsc
KFVeX23MIUpXdx/ivnCSST5JYe3ocyzIbuu1ap1DAfGo+6Ij5GPuH355ksh3g8WriDARhcZxkzmAgtdx

gfReypuR0BFmArlFb6qsPhF8ACGt3a11CnzbqoCUGAcUUVnW2tzfQSUY6QHFh98KI5uuThRRISUkqxs/

doKurk0pf5iAS+jz+S1kSszYVwn1RGwEcv/z1jzdeq
qC3koUV9sodk9PP/f+D38yTdNO8m0c0/oLQ27/5zBN+AR7P8CIs8e0/l6SXTtgcAwg0gXGXCl7wZ6gcj

cQHU8ALGrf3iaShmMV+SCJ9ys8Mp6UNbaJlgd53NamgmteMwkk2rM9dnud19m9xzkaPyKoIZQf31Lvfs

KpO1nvLv/Zcv+fa4W3UDfuyO0F25m5xSF6/O8Qecym
EdtqnoS+fQgf0FZlXyiX22xxJZUGjw4hEwTqAWQtFkS8t+G/qv5am1vW1mljbuuHl99GjkVdGpfie5sP

FL2JowmsTroRcZe77OgksydCfibNSgWzoHUdweJokttxPei57Ce5vVfrmfZD1MIb/7VXZRgOQE7Oag/n

4GtNmmhysetLjNy5BN5jYqNLgJ3CRS9bJrhpFA8rGZ
eB20sA3OrM0xYnlMP9G9HebSDaxrJOjeNqkJz82ZnYtdS0nbPh+QXvt2gPiA7fxMh3CCpR8+xGVRyXNG

gx/b1EmefKsWEGDXtqVtdRf2pvoTkP1QWlw7HlDf5rez0bqYP/bT7siM6rYgOCRrjmu2XLxFytKSVFmF

bq9+pHZjKjO75K1ULSn9RDIjSoIhi/8hqx6uhq0dBl
LVGS24wbI6dZKB3StBGBg8UEXJ8AALRW1q+LTUUDbPW8dO/XzWp7fD2RM7TJ6djZli5HPXZ0S4Ox2/Q2

rF54qe+3x++YMBguoYLHGFVe3UgRvUJH2MNHq1sVOluC7E48SrDSpQyiK9YeSS2ADnWqoMHae6PqsSVS

E71yKStfDBGnBLaHd9U32fDWL/PuaMi3/RY13zX8KU
/hbqUCEIpavO2ZhRd/KMpF+AGeUD+1Cu8z2aB3zRtXM5ekXap2FIieL9WnTX2ABWYlG+PWT4UYwTvRL0

PFcyNu+lVWPnWZ98c+UPir3aoCGJlBgjxP7hoguq19Q031l8TP48i2LDYxnmZJ2R6Ov7ts+8a92tK3aZ

3Om1zoqhC8eyqy2bFgWkCtBGi9vNaeS/wt98ctkhhq
mI2veRBpNSoE6xcMqLUXm02x/XW28nQtZzYqBSuyUqT6Kw81amlvAMT2lkzweI6+YZZNuwDghzRD120B

uPGGqw452hZ1N8q1IeeleP1ru0qiRXyE04aiWLIk6d9d/JalakSLJpQcMIeIUiMBNPQCG3+gG2ROJ/9j

FkU5QzdJoIEpReXoZX6qVIMfi4y4opFH5l6ohayjul
yROR1sy35FmXtr5pYcCXFMVJGMQRpiF3v8WnYdzVTQaGsT/KJoq5VViCGrST/7XEdLjB7AbLowHkOY+4

vReJGLhQ78Cj+LujtmznfZ85QJLo0Zgn/vtR5oWLzx1tnw072la6gBaz+wGLYZ3HUQtkuq0DL9b2L6uz

yiyqbckpYJl7eaN7kRr4cujcEgIxjHrDHTOdjGLT4C
OmzSEhxwhbgQLBuZJeLz5xx/ax/kzKl3YnMuDvzXjDiMFpy7HQes0SVYuz794buEPNsGtpCDMUm+sl9b

5imh6HTPHQxQWRq3AkmrBtvZmatZOw1hZXBeQtm9iR/zb3wDf32Qbo7HlxfNs+Pt4cZS9Jgp8PMH3Q+D

2wztOzF13Qa4JLROrG1LyEAQ58GbNiaQGl8a5X/0nl
UvePvy9bAeYK1hE1UP5L0azfdgJFZgsWi+3D7j9TH7YT//kX3/GrERfeBaj9tupEyDb4YtHUZbVOKw6d

zVV89ZymhmInUBLCZOwRrIWQK88w6B2zyj8GKEhVAcTu1l+cjPZDGNz8nJAxB4R4fTqCRJptmruKt7Zo

Zn/+N3ysb7IrTP9Zc6KmdRl1x2bQg5A+Rzn8ycEPy7
xS9UzUZwDE/IDSQG9px1jRVhoUxerisZHhb97BPGkF7rC/o6dsHBo0DcxX3qJ19ohpJ+2g18n2Wke1sW

7ELPFy1oJ+c+bYrOzPUYfEgldskkkYLLbmzbPEVM9yZN0tCD6u09+CY7Up3YScZE2NSfQ6DvY+NXcD27

lln1klFH/f0C9OqG73SL2wtEucFia4puzhGhF0uyCu
rt3nOVLIpbnknmhR7E2GcvBeJeuhCllackEmvfPIZ1BN7LQYhuP6WGbfHzxfkA5tmCEIrZiK90vO7dUx

dS4nMVtY6Mp2SHVWd/HzQog5x7KKQOJlSCKmMraGU99+fHc4MRnJpzyMwFlG0Ayztom9w22GnJlDfbzD

Jro58k25+Q7M10MI/I6HOQkLNXDrTL22RJEDRI5mOb
M4WNy4Twx/zWvyJERI9ddbLjMwhAf/QAoSQRgU2BsYZo1dYTWvS+gmvlBzYgHcZQ8aWPIp9Gn8+OzYpy

kgOnlYzwule7xtuvlp6lbhsNb6iFsJ78F7KvTEiLT0wwylR7VzKXVz604lQV99FzlTm6gLj29ReMQxTK

rORH1eqP2ycs6byexZ8bOznAu4REDveuxIV2wAGB2U
NqVO553uUIbvwc82CCYQc5Rxb7FAFDC6dgZF80S2DXi9O63qORn6YlpOMv4nQk+uaHYVpMCVbHYvpExk

40++Fghj3tQ7Y74hyZ4qIZ+RHHXLY54ThtvFSumCOC9cY4Y5UnQfhPO64zwMdJtZPnMhmAmWNyRSbWfY

YNa2ZkASG0AcAfVA2wTaBm0IEEUQdBikBqQZGiTQdf
PYz/ToyMXdV+71ESoc2+pMxMKy1lINEiS9HyteHRTugFNj1kLja8UMqRtlQ8nRP83xwCdIOKdwmUgUTV

rS3zRU3U2PpT9uyRse88hdjJeC9GQplIAzxlhfZB1OXcnnRTklXmtolnXTvLNgwSK6F5YJnLCU+9j7AB

fNMG0Jf0IRd51hlzmb2o5CtaxZSk857PKekpoIw9Bj
Rr8TSZtnBGDAPk0116UGzkyKMV47sa4cXIzvGAtrqztWbqNLsMNm7ABwVia3p/3fL1bcp7Q4d6v1kt5Y

vzTb9sz53ah0FixoOeXayqJU3U0jQai5w0+octzUPYiTRJWrxgL5/zJ+jMHq8vJhP1xhQqLLCL5qtthA

7W088tXvrWzNRIFWzDjG5hPIZ+E0get2LNdvTnz7xe
UM4OihM1rEVOIGtN1ZVHRSJDfCVJG257ek/ReagjLBDYDIw3ac34X5KIGX61T0ZpsvpOFqk5+LzZNaXV

pDvG+w5DZswqxq6YmELjtBVAkY+x3X1H1Tlvq4EQkf3YIaGdCWjBU0AFi1Ci1utdVRPno3oFYrTZekAt

dEQQPHS7Djm3gmbA11APi/MCHIqNOErFH9l8XIOQtt
I94qxvf8x9R2E0JqcZTvm1jaQuhiXfuVAsbdJrIoYsQm+wAcjWDjai7vQUBKHZtD8G44V0TtXVRCU/wx

KbA5WqLCkc3DlJBQdvtLVJJwVcDUX9jT8lrkLSDs+K3bGCNoH4znRgWfW1IgxnitXE+oufGKk1bOrQ5M

2EUEbL1jbES1c0eWg+FCgab6TayCeZWXl0ZkQoNqPD
cpHMtPYAK3lZvfuO+Mz/pBMoeif/ALBfvjmZR6t3TNZs9PjpgRq5XPjiGnkswCueFH7N7SMRUuOWmHzf

n3p4DsXTwBWZVNEvG6bEc//i0R/MPQWXLEwarw8otM14sSLFcUk2184p+hJsms4EHo1X+7Ao/yAb4Bau

VbviY43MuSo722n8H/bbaL/gBInTNSbErNyMVFAl1X
f9FzAKJzropikMgoIGzlxE/VFzgk1Nrh1TpiSHOv3Et2kgLi6uDVq1y9MhDuxOTTZ9ALzd1tbp8jsrur

ejURzv3kqubl01ABHe1jLsWmp8AO08+y2DSK7gm8nrv6MFSM7N+/UQ+rZwuO2AStRBtvG0lTnj7h6pC+

RI+pbe6ieyn++ZBC/qn6LPqNWZWLaQARd/z5qXWh1H
vYbY+52S8fRJ8p07X7UzwsChQSh7cgz7nQfBYVcAIOSHE76qpvlfeGcXJY66JdEbCXuRzc9XU0L4H9PB

CwB3oHN4iX/B8hLiZLY/3yP5fnRfBGqGq/xkLk70pdX15f898fuA7Grd7oBWAcrzxhZM7b+2o7dWMZPM

d1F5PYYgAACWCInA/1mUSstcg0HQgtbe7EwcNh7OYv
ygv9gR2jJIU8cAeh+wxowSTYxiQ4//hMsY35R8/SI0/VP87fDo/sSx+j3JM0Z8QQVuUh1oUsJf9oN1wb

YP9XjN92P5cXccHSTe/3u0+G8qeDNNZRA4io9NnR78aZAvhoBoxSWH3ArgO/Ah3KZK/ph53Ye3ttBwrc

PFM6xbybEK3nUNYyw970FnkOBBAF73VOcDyJ4xBISf
FzPW7USP9lWG9Y+E0j9kFjilYkKiy7FTLxL1gdqjgZjy0jAPfffHO3yJVGm8gsMEvWKgqLwjm43kFli9

5vlZTslOGQflTCwql6bgK31+30yqnj6X2mef30mhFpk4pwMBMyT7+Rwxj1a0myCoc6BNifvURTdcE8Nk

YIMhHWPnJBah8yPk0dbdtnYFpf8k2a6s/3rezEFj9O
VL48HFNZcfN4HZ/J3ttoLcFntKnC1Wf8mjpZOzvzegsLgVelP9NFcyrxYlpzd0dqMh+/1w9MhdNyYyxT

IcREgWt6kGi/L7Gas76VyuF/xcdMqL+9dyVHuL8JJohmnEhdParStHUI0q4GU1p4rDio6D6pl+4OxhII

VXpxFq8CiDOSsi9T6pIy5XzEG0Wt0Pnj5lYS5OUuMq
m2tTuiQIZysY6NuJMrZe1V2xuTuyaeKT2K0IdK1SLnkStBkiU9+mHcP0JjvUtmDwIL6i44IdIdjG/BKH

pOSZZbl1eOUBVREQz2K43KfnXt2fmJoAifTL+0beHU48Ybz5CnGaowRpTFQX4kZrDnA4L1vtXsZf5gWM

02RyQmtiiFj8g1gaZNcmUIT5gKUvAQkAuaxmzChej2
JFFdDUIluJhM9nZEBwMfXFK6v2M4abJTsVC27QM+XAycWi5ki9Py4mo1T6Hb5p2fiFMxeqdsjvB0lyLa

jeGZOnVSJLMoj24o3i7ElinubTfSaGJAc3Ut/rIyxTO4IAeMBe1bzaMzX6Ne9CuyHs1z7r/sXDUcWoI9

lsrzonCH7N8brGgmyEXjQV7H7IZamGFV4shkGJk8dt
y6NgJZnFfLk/cIjr4mDr+OgXBjUEp7d63ehXmcGu4jSmMFAa6eYrXr0qycRCHDXxuY8bf5Ta6HEd1fCV

POEy/qERAH6kI6rwJFv8JHOuZ9yc7HQ4rbhwWJBVWqhuuEtKiQ/5k1Vz3JEPijTpuqPW0VeVrQzbrSRJ

8TFzwj3eF2fv8ibBIIoRnOUX6+z/DgYfrr56y+3UUb
jkPMLCB35qP4WnFjh5P0+5YGRavHbKZ8XweVU+ib1ZUMzdzDE7IRxV2Kx2WMJcSJoGkYYQQlJwZPREhZ

2Eh7lmwj6bgRcovS2uxsVn7UBDcz5jX91kvIxi92uORlCiW1N6oQKymxfHO7Mn9E/EPga0Rv9XvTqnJI

AOJO3NoNVugM65LTaaXhkARElNTSblUdihMDnzz+H+
zCwKXHTqqtisSdkc2y88zYh3hcoBt6ihSYW+02wNOWo+4Ng8FGkFOHUQOk8f8QkLpGhQpjVgAvKqKQuk

3faRks8e8Bp57cWdLPZG5oBH1L6VfQ+MDG90mRarukxYGAByBaFUecaZfcMILk0/2ldwazX6YafP+9af

jiLzptsJNk332k5+ORTmTG/libLj4KTEkpE9Gndvp+
c4b1fQzBrO5LBr2M4mrK85ft9nQDAHSjg9Kdf7rndOtnIvQKhEncuSx2IAd7SdzLcC4pGRf5+u4ZqX4Q

cgvD9vvMda7RT/yvGxfRNsT0dqslLVt4Par9xnigv/Cwo6nBy46/RdGgDFsQS2lcSitD5PEQeBcM3Lxa

4ZdhxELzWSDZgZ5Dm8zm4TgmJj6XZz4IXPW+QkVSxU
KIWZGaSNwLZOID8GkmyGESL3lzphM6CBaxjdwCBZw37YmnTJRVlI2tVD1Q05aRPldp//JoUpTUU7+bM3

I0mk+tnNysQFTpGKrWyYZHK1K28tJGwSxjz1tK7KQUie5pgcDX0vfhBE3ro0nHb3CUSMuEQ30MuticxM

yOR3NES9sWFEXxr74aAany1v9TqK1bSwmfr8ow1V7/
wBsBHn+RdOFUNsLsU/ct3wWpTSNZpCzseHM53q9uFQtsek4PuTicPNqZXJSWTgz+nkJsy4xXcWzSK9mE

E8DXkE0FvbzwJCvrp98OS5Xxh+HgNI7soTpju3JKrxpWh4QJcUUXfeLzp1sXu1U9OGFhAmUnA0mxgNOD

D+7lvKCVIZUK5wFFiRRH3limpRR33/u0jnvV0y4qM0
+vNqaUSu5MkkXQ/WymCagreydl8rvk5g3wYuh3Dqfa+fUjLViIjlBQ7Z1WahWgcB2As0ACp1z54AeVlx

PurdO4K6BU6VlkC8v9w4OXy/t2yMujc1/m7ZwKNM+zTJYi7NTRvCKqJyQNPF46VO5NTZLI0xLXUgKErw

Q+f8khgy065vednDtvEBJCM/0jjvbCXETigMtmydtn
23fw9E2bcIbd7y9P2EKKLtov+CsxfVz+7VM3J8AFhK50eqPbrGbQqP/XvnxyaDit7aRseDWSIy5vSALj

BDi/+9Q4GDFXincvqm1DkvIcqiLYkO/LE+GYJsUODWZNuN7koyLVTarXqegOICEFd71UyADW4H5YZduE

F/gdEYpe25iPpVG1WkQprzbVXdfHH9i77O8wb6ZKrS
3cryGBqnTcnkTox8VQhKrB57LS6KiNi6LDjFQSG027GEHAn1R6lY6j19K9XsNHgBUheOVItbTCoreZO/

TxE6vcgoCOsma5apFVSgYbwJaeH3DkgW4e7sR276V08FVFuzjoiVxwxJ64BBsrQgLDnegygPLgjJwoym

2xK7YWVu+wWAUo+wDqgJz742AYRzfsBvpKQqmW8+Aw
xXO9Sc5PAHIc4evSlRpq1pSQ9SKmgJhq/97TDvJyszmlTt6J38Lmp/De0kZH3Aa0xX8zIiTaXYoE9ZoP

L+lbeboisLQEUPGbQSE5S0ViFwqVtEeMRxV+dfC0+cBgpz0FRUMaZv7qyFivexMvNlzgPPLfczDaz3TO

mfHTQTgap36H4q1ZGiCPhm5KGv5Nbm7zleIeRMdoPU
OsTd0b4VNcPiLjqfid31ZTyosQUGg7r/ruKf4RWOx63Y4RtDJy9YaCvUA/ubfZ6I4YMtQNxO12Ze74Ok

HR6wPqzwi2vK9NP6xU3tyZg0lX0Pj/W7NEqMXHFhvXY4q4gLmRZaMj1wWwT21qPWQPJ1khliGdyH6GN5

SNXZCMhAiPUmpxpr7MCaNG7o1w1YGY9ZqRvPihCHnE
4Ivztv+LP6IiA82TYbkA6SRuSToJlcm75kfmAiv2sxSD/8kvm/sbG7O3qX2j7PI1FsQT2jRmWs4ushqa

/ApWGnHkBV2v05F5rn9ks63xmH7W7mdIYUgGbWGlJgHnQMplmB0E9Ptjm4Uf1Lzjnn1SLrwmo3XndhBV

lqnIltOrBqQ//n/9Pit0h/KWTSGpLZPCx6XYKSewos
TgQQoirAJYMqBEXTpL7Sln+/CiZOZQo4odz+7mi7TjmHCcaSb8y9+CpwfgsHMZ1nXgohSwtTaCkZuThc

OUHOvuYjQpnTn+RbcsItzy3lk84V0FqAPIyQyPcs+TTGseklVo0n0J/lj7qv/DyA1SE/6CNjbPWt4U+5

onNq+BtZVd4ju/tlPbEIoyaBj8bqTGjCu2YQgZO6Gf
OdZ/wV0Z0cUJyfJd64KEUPKZ3iPuj5qW+0zbpfz9fhOo9gVGA4za3Qi61klcn20SrMvG7ZFKb3Oct44b

LYbKxviyH1Q+A2nBit+Uo4BgWevfcOFeJ2YD0wXqv4GucMeHuHdFoO1UWX9CWzdHwP7kBCvjxGAw2fyL

qSd1R8r53zVnVFXB6qO+4DRZ9bfho1gX3VnJIK5kaq
IGgRCM19r5QDG+J4XgrQJ8nZ9qnScUuu9dsUzL9beE9jGnP95wDnlzJ5QW2/sLk8jdOxHrSgU9lZ74PU

qHbL1VZ1ys3Bg9WMQxDKCtQ95LnRNi06r8uHi5sFT5eFBx+XtbnGBLHbL57dFGmkICngzVYw1rGzxtIm

uwdhcCG0Dgg9+ldFgFFtmBTfeStYBPxk/DjmdAJgCq
0bgLGIeEgtZC3AWlhOJfy6KTKUBskt/5LJEqF6HFBZ1/Pmsjj16XKCou3tHJ5twrnbGbof9vdh6NHNhR

qi7hZ9BdOcT6xaGTFC+lpuFOchWBz30sHo+DGX31I5XyOloXfet7iOAsh54PPbI1xValCPEApEjxV6gs

nPzASi4J8IpicgV8olajLasHXzHfzHr8qNl5wXdFCb
f06MrR+z6drW7S9huLwjr5re8q0ILN8GOPeiPhyzlSW1spaiunqO4855RPvr0eB/mi8QfUSTQrCkFpMH

afm2UEnlic3wkVna3mLR5DVHvyYDb8w8g3Jj+rJ6i+o/yCTo3PQha/cKdci/z5iTD6gkK92OtvIdTnsQ

+9jdotnaK9fDahm/QflSC6TuLOla67Cd2J1LR02jfP
Z9imdWca/zm759/NdyrMOeOUldWqtnIGK+A1ePC0pma65kkAaCbIeHOKRlzTVJCZ1B3DJEBUyHyvNMx5

LfkBZkmO+nsQarOVFrHf7Wr7eHewtpRIWRElNYg0zlkmKIPTVt+wyoZ1wwHL3cZZsA7TNai4Gz2Ne0XQ

NyvRVQEMbkCwQ9263E1oLTH6ePrz8FtQkosLKSZIIT
GZ/nXhcGeBaaJomzn99iNPUVPKEFM0MtHwuV9X64jKogTzaa8wDlMdHd4Q8OdJLNA9mSRcmM0bWj2rZq

rqAynw4DEhkgda25shZlWDHLXA2WxgBY+n3D5cmZaalvCkkjKtd2gG6hTlGKm4x5aiIy9jzyvZPBX2lQ

UarKXvUyHM9tWpKcYzNGHwL2IIGI2hjKmyLtAxoapk
3slqdB93BtLHyzsi4+K9ja/FmT/zvMie8L/Q4CTDfbPmqLGOZziwbEnGoRdISeePptC6kXtFAqvCtDQz

Ee62enpWxwn/nEPQwL7dCfcc8eyz2WFy5UavD3fCtmHxtLKct/ezPo/vJs/wL8PtqCjJo649RBx0Q9fG

9/FkEyFeGlK1i93yO62lLe5fU5+SwHWa14YDFBfr2b
sXp3z2RP6FNvV+XbBJVpG040IU8YR4LaoYRGA61wUA8mqP+kxCYYh8we9+irMQYnizmibqo7kusBdXLa

OxZf10i8LtaB/92BnTfMsQ9XNIQg+QVPKmS420w2BwnGlPLz4Bv16jnmFReag+kPy/dEwhWYvxCCwJVj

LDiAR9qQ7Zcdy/Vp6DFCkDzOaw6ejoP9RnJvdIKR0p
Wna2dDqw7k48SEQp1e3ksX3emrLKQlxgg7LZReptm/+DRLlyl8hdAYWb6Q620NCldVdsdwUyK3X4E9nW

+kWhylA9lXFPo4J7hVF99z3/xL+RAk5rkLj77QBXOOgw9cgO3zOcRMEGyPCIGXZGtf6UmRrOBU4n7lf/

+Vyl474sEkhXDup7VmxAmhiJmNZ+DiDLveF4/9Jtjo
ZhMKLs9txyFMZOW8doaK9XXORL/iYmfIl/HmmIXJLDsRJ8ravcX9vnJax1mU1Fv5C4g1aHgrlg31eDcP

dRQmNn//g128QbfQr9A+KOJ33WVpb82AvJKm2speAfO0nShbwkOlO+KA/5rG2l1hLe1uYZXattdZmUrn

BXnr7uPwuJUcKFc7ruPrSn09cazO2mxFJI+maTu7oY
mtm6On8W8Pn3HpSPirPPwaPVYjT9iYrTCjohB+caurfZ0vvUheGjWnDvktlD2Ox8eQ2P3NKcS2gKW3HT

LXz7oG//N3SWsGICcw8EZ2MVU2LTSyDF9HVvhC9ZU+3JuWpi6QWEvxdqbigcnKVlWYOGLnvcnqDK2m+7

04ZTsDRvipbmrk2orHbR5oO39of7oz8kfLWP0ZowS4
9KteR2337hbU6Uko2GbHkKSR3EqTN/VVApWt79CsSm95FXfEKnqp5t5PjFNV/k1NbgIpwIVBe9ZJX8l9

pbpTFaJnB0Mz4WLSNBTi3IpP9BXSGkpSqzZ5TCDnL5C9VLABoe7dhqfarTU+xOcENV1o3wtqcvcCEDEs

6dgbYW4x0GItRW6owdm6eHub4mYovBkSsnw3TQmShZ
eU8ycVJBsZqagsekrxdFO1DOtR0uiF5n+wHc3pgXpvf1PnoKwclOKrXC4iV/YwVJ5ja4yob0Xw+XIZjC

0y43k55L05uqNDMiZutip8zejMaVj6hDdP9mjojYJnsl+JMT5Tl7/pHqoTbgdMJ7BixVWobISQbuyliv

FisybU7zbjeeaEH2V9zADE4HMd7AEOdbAshkctzJcm
E8YRqyZp8kU9yuFMbiiuf7Rjbb8eSt0HcJDFkTjDEryjYACEYKq90SmESaA5apJ8tqM1vFiBYlfRX1FB

KP58ZmUIuehdV1Bfieg7Z6ZBcTLHqwGatk6DPK4E4WAJnE8M/n1r9n9icpfVqFaebOwO21uqy5B27/t2

TU7tqtFS+0nlozdpUAPsgEVSt7qtR4Uf//bI1fgN6A
CRWVYM23Rk/wxjk7xEf8xKOFuLozgfyDa1EkIXIfH8l68rjgkTVTs6/NtxptMTpfoPQCDRf1RN3ZLpjf

dU9Gw7ikeSM9aB4n/gdBBUeofAldUDbjtFgXXwOXgTsvT4xdYI/Z1z9MISTDNJHuPvEAT6OVtzAtwHfd

E7NLymM7wLDFwQGznk+oo2c2w48RBdiVjEoLSKNiVh
AoJgxhnNq95vEbzQnDYi6S/m86fK2PBwY+wcMqEjlswoP0P0fqdHlW9DlxD4JzEAf4MiBgN5pY8zmsE7

nbxYZqf5uoe3nGtaVJ9OHPYgqtptATrlICbXLGXz9e56oFM1cDulAP3efr59w1r/FKdvYjiOKyTh/HRZ

YlA/8pHNMdSBWycNTdBwzgIrGgixiFhpKahVdFvu8V
Y08q7gmPp4n6Zkc33Y1/zRDECTyAjeAQhqrp+BhO84HyqOSNZVdnLxdSjTh3qGWxcN24PzktP+K+BMg8

HS87jX6HKLnp8aGBhC3WfyKoLFhXTlutlbyeKvROR1AHFkWlqxF8YRo4tLnvncN8jSP0WCoUELth5+OB

1IwJ8/DcrOJDdtWp117p8++lF28LtKttW+x6Y/t4yq
JIolXs+yehzkYNamL2fbwWkL3K1leDZxlyguokptsh+mQFS+EA/oRag8qIddB+cS+NJ5FD1fYjYYjqPD

bLZ7T2Cql4p9+5T1/wT75n6TlIPnJfAuBitlxEonIPIEYUvAldzFhmHDFgbDLFRttKlq5c1AVd528S0g

R/Lh+7zvH/S9lPY7005gbCVl27b++3u2q2Qrf4focG
lsi7iBJXj5VYMylaToNh55aeDFruN+bvB+7HUHYmKPaE7BgeuttDZ+ci0Wzye94f5q9E6h7NDUjf9vU/

KQnc2fyhhfsV75u1juyOi1hgziODE/NQUXM94g2jeqilkxoyRhYncMrAsPC4dxw8c1HZ5DSJQdCRCgpf

8GR7gCZrWPd2ZbtJFv69n2bPe6emz5PzOjgwHubrUU
A5m9y3lni+nb0SX1VMnkI/0VeMog3oZw34etTbV0Dgeif2mkfIp42Aicf/yGAday8mvPWYLShzXctRa4

dioFgI0cYo35k6iEEypd+aAZanrnij0onxTbtfy62/nVKLzG36yuLeYLoPjqp1YRjEoPkAPcaUtoaW+r

V/uDAIfTF3zC2wBxMYgqjrAWZHmFnap3/PjWuS65FK
vQklV8FHoHwQV1rjTMOJPx0s7a2+6gd1myhLjILrKjQa8s/COXwONRT51EdTYmyRMaCqWNDKTodkHE5s

ryK5zvG9x9Azsy1O7KudkYZi8TgJ2hmHSKrILMGkAszU24zWmdcId9lDcYRzFeAl+HYsRBcTHh3xhBpc

J6AxXEndFEYqWlU/CsRYtGVImrrB63/EcVMeJedGqm
WssS1Rq6CY+YuncxNFn7X6YuXsUjHcXdrqbfg/LkHZ+mShqiqZ8YlnDOFSdYI6xE/gaBRzL0jIDOkhQK

8sngMVKVdSEUcKlmHLU4lKGC4TIv8K6MT8Xmk0+KvmS55LyXC2YnwbwXdPrp1WhmRa8cfuJq3K6yXPlV

2J8vfa+0u9TQS1KpsOCaNNpPoOtT4UhIw9XOsPr5om
x3m2fe37p5+MffxMCMOEW9igg9wpmh5OG3uReBCi89oqsYgrLyklB9X9lPk4OJG9MG/OEsTfRYUsEE8H

6YVzKnJ683r2mqpwqX7VF0riCwr9ag+6g3PpyEUKYkNjwrOgQ4HSOVMCM4Xwvx5EJz6/POApdogNJVNH

xh6YDsdXiN6zchKUnSD6IDLPMfNAKyjghVI6NJ2y/P
f+UxnlhvyA2QfwHwytH8u8TkM+7bZCejVzOk8knEWnypxnx7tBJ9cI9RmZy0qFhatY62k811mogESfbs

6V2aJ9PEmvnrwgtG7IMB7YDYaMqwfeqjcqIMUQxxDEUi8/2Xecs96B2yXS5/1yGC4xgsQB7j7/23c+F9

YQxPueUDDgITvFtjKtK4YtLFAnDTGz0+PnTBSxdzcg
G2g0Z4/NyhFF2fVvbb6i5s23weKEOBmPmpCdQdyFt0s98n1750xUoOj57mdJg9a2oWJ3lZRtmO+BOarN

QYv/3q2px3po0EWF1nLtDgOuZxkc63y5EJt/BkJARwGBwcBirW0EeVnumAumOOosvbBFkFQNmGIyGjxa

4hbiucZBH15TFGMKiaiaVy428++p4lJ2HE7Gguasik
4eqaG7mPzQ5+LvYrswBxxtISr5JEUH37K/Cj+Dz7a+bPylIzxfa/ot6Vy4BYpoPf9CV2KquZVyV3HT3J

KvqqcKzMFV03tMtumuu8CKDf3Pi50IM5FTG0+62+vzJQ10apdw59ZlUT32BpkePDOCHNW/tkndcMchq+

rZF7/ebVIBSbqLVkALiGaMeLBpzGQMieQscHpN/B4f
OTPjW6jho0QjdwEI4hLr3qYiyF/26hM+Zk5o39/dZyZA8aD0FI7pHZ7X+yo/3e/0mCNDfWpjR/Y3QQM0

LyuPXByLWaBTS7pkEdIn9TqR7QLi0kzTnfxh0Mpbsk2LwRrqc3SrbcQcSADnSJc0liORcEvLhm7RUl+x

F9v9PmsjVmVhP1JMPrBaseaz4wavs4f4wExjjHeYP8
fsIkzHThTPg8fhLRm1d8HfPM6iD83SwlGHOjl9+fTzTa1UJ/lFiwQ+qb/CZxeTkRcajmWWYDyum8tDPu

IYK2vRcdkXgLyOyf+lUcabxg7jX/6OYOSV9i3WG+80D1LXrLRaEubajQn8E7yQh8JTprIoHEFxNrmWe9

buY2dK1pgludVlfthu5ekEv0yE7Djo91OJ0sG8Iiae
YE8DokJEoigyyJ2Q4jaPjqjD5IcvVBcpt0muLk8DwmPxfzMo1q3baF939pkstPeHvpHheKCawh5BYOFV

rvWFhFWLHOu5REjpizCfvg/nv2bHds3UTnD90Fg4eqaQZ8/KWPTSR9EKdFzrrwKJuvCvNVtm08iVYG24

r5A9i93ikk9GDbkTRfndMGvFqSQG7ZE/Bl7xPUue8q
JKfOq93MJjlw+LtpjCGZlW5LfoqyxBRCwQpd6cNmbykbt2DbZHEiQQv+G+EBnpPfWqaBjqcLoep8D4C9

AV+dNGPhUMGCXPYNLl9TdOYdfNWxQI1d3HW6csvoUBZ8w6NmmsLg0ZXo7Oe31wKR9CP3E4X8wLoqWByz

ZAltiA51jDLGQEaIiO3Upa9BRnZmIQb/GWRruQfgYk
DakRBEZ+R2gMyM3UodUY1vSC3qom3lE5B2H1W5wqmtjmfcuoqCx3/rGLq0rT6mHK/aBmsQsqaIN71tLi

p6gjzu2VQJMxQ8nY/oUbJy/x1TBdlSy02a+NUE764txm1q4erYpulnHQ9wluKSuHM+C8Ey0zhvmoPT4m

p8by3sN8KczXG60eJoELvZKQ31g1iwaDharb4BKQRc
iNWbyn5Q7R6d9nMi1k4Y4Maj5HDGZ/8mws5RLZsS9JRDvg8+nPW0orSZYdWJG4wkwDGRuzMnmgQ6zk1m

/6lIEv/zW/1Z+WOVOLxmvC1CupX82PIWm+bxlHJAigSbetaQCcpyZdmTFwdn7BbIGAddaD8Lf7lOfFtm

DFABV8dmeUPbeceL1DnOS+sdZbpWRJEmF+w4G2zlV1
kcNsr7GjxVi5EP2Pvex6THCFtlOnBx/LTU0a6V1PEV+RJpgvr1q4H9QtEOi6uJ92JVEND9y+uDUN3WsZ

DzpqeoHlZLm65OYqKNH4NEHBfpwJ4jRP61t2lqLahnuE1Sw5RnPXCO0DOIQc01KbJmmByY+rnUkbYqq/

cBtj7HewxZ0/mykdlvDIoA7dd9TBRMwdbGMh/Cpumr
1/nAZ3YRWuNoPJ4nNFbpgeqTrVIUSGQmE9j2mo2u74jhZnRc+vJ56Mo3oPRIH06Url/si4csNtfwhGH4

1z/0k6gpe0MlMNFwT5Dayt/idTUsmSQd8eY9ty+9gsZNDBOewDd4J1Clszm65EIWZcUndMltaDMfO63j

k+iYwI+F/c9+/ovIrH+xnhrvXv1DJzJZ2WiPzrspmQ
E9MXLU3IxKcddy6SzaLvHCip8U/5uS4EG7+YJep2OtpcL0tT2alDnijsh7JK/HLUseNZdXTsKlPykbMV

ouIof5HKWCLvsdwlFoUWyvY4cgf3bQkHJS7ICzEd2smqCk+6iRmyCCk61Y8//lXOHIh9W+J/K4bSwvlF

MxgArcIvGt2wSfoFF+ncmjT+uEbUxA+a/QIsUZLwV6
6OsPdZ/E4cMM459+XPJvbr0HjESOt/gLLmrkxdqyALLA6kyMtOpIBEszjjW7rq4xIcBAo/nX6AUnJVc5

AhVw1odbM3P1y2eQEjNw60c65JyALpFLs6A7HkTgxl7m0yYW14V0+hII1euFhDs+vdztz7bBL2XaFZU3

HHSvW++ZzHnu03wn96wqx0GOxdVTJKvx9UlP0DLMlq
dkrqUo0DHPgNArElp78XtutW3o8zgov0oQttOLcRV73ycNzDazElKHGLb/OWJ2rINsqAVQsPJGk0Hx5o

JO4RKb5C98jyKn3IP/I2enTrOMXEm7ysuE/W16BOCtbJYvBVZnZMr8E9Z+drdNZR4fKtc/BEymh9U/tT

7bciHHSB2QENoQYrg/hNwnPwlpDNoviF2HjuMfsk2U
Y67YcneX7+bQVOeKR3tnzDcezgdCTZ7UrDk7neXIIQek788tikyJOEf+U3/lRCPQG/q6LCixmf/zsthz

Pjs6iXnklc3mXgvv+hSo1X6mUbycQ+i3WtjpL5T7Dyy57HOgeH+N8aP+0HQp36o3TzbmoM+QGd7NtpdF

pyFcJjkb09dTCDCJt819I7HfSn+apx+pe/1c4m5gcs
dTDRUMiDwERRpW2F+d18O3cA5+YuxAm8BJZDvBf18QS7j83m1IGI+qm7x26sqyirq1wswhrdyJvR+YEM

S+lMyYshAx8hffMBG5uyc/Ko8jMyLYpUkAP8IE6bUPdOPTzPnl6/a20qHGARa9oa/P4NwVzVa8qN8id5

o5NJA4RuGLqSj778t7w1k8/EhEhwHfD100/Mv4319N
oRHX3nVW8XtonkwTXe00xb0xQxfLKTh91pliy7HUnIcItjbFGCQw1agJHyhKZzmFyX5NTk+4dVTZ64Le

Tam0OSkITvKLkl3nHz28efkie4fSxRWgi+oy+vEQbQtESNvmNzx1yBnyXGJLUHOH5HbVkr9mT7+k2YMR

MzHokGOdS3UQwI7t2vfkMl/qr0P046pyJtalB5xB+Q
J7Z+K3uAPk6X4klLc738blaNW44Ag/nfghe0LWlahhS5ItW6JVJ4tPDgpv4q9iUig4edSwrQ4qUmiRIk

ooStElXA4UuARMgnfa5nz3WFjZ+WQs74tb5QtlqST70/n+x2BEjtWJBCvkfcbB9GOIviaQTVlBt3ANh9

FWm01NOXHUWsPgbLmPjtWhQEDde+rAobNp/EYyOrs0
N8hk49iqLOzvCVmb6o3Jj2MXCwrqbx6yykUl0aHzCnBjw+m44glVkHy6ADjd0aygF1R0i2t+DZeF1uhY

RGS7JDv2pImClR/qkCYoouRFG/8f7DBqnYo/5Y2uBgC4pLg351C1ynliGYJb+o9RN5+OQ5mgzZyMJV7w

+NbPOiQQReJXoP/ynoV7fDTnLcDsbIIVGuuTDgEVeO
KqCKpvFUbdzOd3FCeKXgzjqI6+QeZhrdFZPo0sK0N3VVFfTqt9d6al/11LkGCdg1r+VRYzahO+WRUps7

RkRpDmxrVBiOcu/YDbz0XWWgc+OK6AVQB9cNropkriBg8eOP1r7+kaQeWd26EDeRxAu9BZy1cuXnA8qe

Q633bDIpvvsnwcNQ8gDYJpeH2z7rlCsWG19UBMyVbd
Ar8EsTeA4uAz/F7md28oS/7t9D39CEOfjBFimyK1QsztvDdM/CDA4TWg/DpYCM8ofebsOor8A7OtOlYY

smqf5OK3YdXxNWrhu1cMiGmN0wX8zXnVp6iPKAPwxv9SvP/Ukd12z066fw9jjMfqZcM7ksCaua84TOV4

bge2pzYQ1yU+TcdGkVeOinBLfV3gQbD+8DsrvmE+Dd
XYiYWiQ9rfFQvmwmg1dHm/H6x070IIKrPN4yVHAQje07u8oDtAX8egSA1cf9sX9Rwna2UaxSsdJw6oOU

WF8Jk/MlYnCj/5E/Dqm1ZRCoVmCPIRUqSIFH1ORoYxtDeRpTfujmi1mfJAOuJRugQ3jJB3/Uu5y3lYGC

BeAdfR1j9Y43rQndR/l7MJ3s8JERcTAC6ANIclvdcP
IUtk+lOFoejf3ipw98WW5HsH/kG6v19DmhfxTgDxLfc8Wk9IHlHRuqduLyF3jvFaH82t34ANNcvqMglN

5CnVfdbFGfeilF17MuDec6lhF03/RHaPpNb3q6T86JJQclWlg4zEvruH7gfkaDMuytGv07bwzy3/rsWJ

sV7RT9KnGM8KaddpHycBpFFHosiY8rlMPtcgwjHwLL
kFz0daWKt/2i4CjMLyD5s5AguJ/WIsEDzf2DOHsS1gT939kq6NNteSiFFttuqN4oMUTvq7ce8sqOv60l

1/Z96szOOzZavbKPJmqmeQKEk9dYLhMFcz6k1qM//weDQO6G1kh9ExmNVQArL3rUcmUB0Ca2nXwfNMuI

S3I33KmUiUm87nsqo5i5H/1QZSJo17w43mW9r6QEqf
2u0kM+m3Ve0Hb+lHkHatmatsGxKQIUZ8tggew15wDEhp3Kk4tCm3Wt7/VQHUEHcMnhouydQfqh6Pizey

ueFwdbY33CKShLTwoBoEqv7NXyuxlhDlr6FTN1YwcZwXdwvTp4CZV6B4dR8LUOg/L5KHFEPs9ihQ/udT

eMsp+tNN4AqxoSjQwj5JIPiyMJtGa7j1R+VPdWY1vB
5XxQ0ijdexdb53iKAZa7l9bM4HGzHMtTFvv4opBuYiQdrtXQVS8DX5h+PxkVwiKt1gvqKPXfBLOQVHtH

baH51GyjqUIPISyKG/HFu+HF+HflEanmiwy/rn4aMFkdSRuEP8vO0VuKZnBKYO0O3s9dkVhyyGh5fMM1

rJk6YWPREigbmBll3QO/bqOOp4Fb2u2HeMo1y5To9Z
bu+c035yGXyqO+EOy1CvJ+xfb1RK6DqZ7g+C2+jc2228FtugMICssSd7Q5R+w7+DlfyFzg/gd1+kO+4X

ahXXH+4S9mkYbcGjmCGhhGNHdj1AWA+JhZwMT4Jfwo6JLS54hJx9XCRjHMqYSmrdi13Tpl4y0CwT6VS3

uCEYwsbfVBTJT+14IJbZ0C+X0kwaGHtzdAhEaQ4S/G
SGTELPhWC4yw+d/tbtKA95OWaEToMbkRtkgSwUwY/Q5Ryn3LMZGFEsLdEYVnkQNhcH9Td+eh069F8XQq

MnPf6LAe4DmP7kPjN4qvkgfhKf46FNPopKOttfYu1q82Op6sjUsxo1xxxtppkOJ6xHGZTNsbKrD1wl5E

+LYv0blS/zNGhSbRGtsRGArNCf+yrLRVbrwJggemnK
Un3YKQtE+iZri72jk1APOuOTP+XT2XGzQBbD5HUOXzolvZxO/NBhl1qhYgqdJT43RN+MwATckPje3XR7

U90JUFlWe3ItlSPlowAmBGbJj26z9tK9wqHx3OKK0dHH317rXURHH+hZTgeny3TjL+1g1kEyGoaR/Oca

uh8mpvtFFBmRJvS2uZsNmEBMuUSuPaPDC41XeaMViX
02yRfVLh/sfbukIiUt19Ki5RcBwRxzwtXaayzr7FXbiCIL0Habevv4G9jJZO7gAhsV8xNrPXgwL7a4mp

5vO3pOITTVUf30qLb9iNC0sSyinWHvnlGhGxHtjE88PbNVdhkwkdkePnBWs3+ziEzQzlRHNuszybgbNf

rAgdMjOXrZe5eI0dlW60LPoWUvkSLYDxmokSMtffkF
2XvnH8b+AbbXn0i808iHBm6q3bviHIbqaIK1jG7MqyorkSvCTx4EKhQgnq8G008+6Qkc6eQQbX5sVCBW

soRIM+qvn7klmNfEP72B6sfMW1nUQqK5L4QuhDxqso0Bxc4fn1DZQOTzIZNmu+XOn22ztIQ0KwxfGjOk

Qp8zmbAmQDmCe04GG61irl02z5w4mOfVT2gTGGAdRB
lRWhPDGa/HxY676ZWL4Zq2S3O24vHMGLxi7fR6Vl2LrX135O1o/IkD9NkhEu64xaSpqM6H23ux/cR1Sr

R4K4YUdlq6wlQvU4OP2+T19ZcbKYpvIVxKxJTaTqTEX9PWmc9f8PimtvdNMX/2q7DokdSuJt8MJ7OO8l

Su9VT6ht4n7P5z15z7hpfcwhh+Clay/elMB5IKQMhpL
0+nWy4Nlk8CG/DIst9H3OQVUeceGgMZxAnA5Hzqg4/ViThKqI861ZtOMto8hQ7mQFBCTTphKWn92+i58

1QmvWAcCAbX0wYSLfN0JYfIn5wnpsHes/Asi5CzmRhGaIPOw58VbsyvTcUclAQOxrp/9vxeqXmseyMDi

TFJVjdumnjiv8tk4YogW036kzsck4//T+VcNpDps/b
lszJ9qQJs7NiJHfFb1dgULg3wboW2mi7lejD6wGLbx5J+Y+PWEN21S1coQjchsF+aPJaq+c6UUFLdzWW

kYZcyOc4OIk2NDnCpwcd+RDZyb4088i7R2F5ysINM7MIxuTRjMvEIuzEtU6VBgv9mTQGDUhflUlxbPhZ

/AXZfMltlRdtm0LpwXa+qb5wkGnneNvdISiPyMBdGy
K8jjJ5saXuOAnrUdbBp5iIplkvaoYfakKhYssAzKo4PjSzEB8yYK+AEFkCJlCLHOHK5gq5tMCOACM4wa

mhU3C0yuL1ir9d+J0pl66SS8fQFVJRljF25piOTdRsn8MbmLWput+mhYjfpwF692z1MTPh44rIb6x5WM

zTs0LsMGFzfC68twlyxtu3q/i6oYN4N6JspvGwCJbl
aojXSPOhGwcKSzp6T9jwWObmNDxglT0QSNJUDkVntC+HpG8412AYOCrMCsSr/ttsDYNoy0MyDcd4/Td+

Gs96L0M33xFGB0q+bbX4y5Oj19p1w47HWiYpoqWmoxjftNIfuz+Rj3DmiF+iuNVgNeSvEtKj4rk/xdCZ

YvNWlaUBi0GBqePb8yd1GqoLKVSkOby96sBB4PFw2k
Jvrb0c3JuVpMar8IBqTDj3RAPcFBrG6nLiA3/asd+OC25mb8i/YUJkAlQWc7xzdUBtPNxGhCgwO+qzrU

Md564YmZ47ot/cnlaEz8EomJViXgNdgncOncA/uc4LQD1QRkb/112Pg4qTU2s1ba86PSfB7UIPhhphpn

cbMrXkuWx+NcrP6Kp076gOFUQ9WgOH6GrKrTVHQLKS
WZa7hOi6UJbGtCp7IctcyRKXHAfLOYefqb9unB7UWiu8GaCiLj+VbidzVJlpx54zEgbYTJBz+Uh3gn9k

EDgIakH5f7usT2BD/fiAXLCmU4ph8cRPV5trV3tkz8N1Q6KzoDO3VliA7v1uWVpWNlpgGD9fII1Nb9vJ

vA+TlnA9kJb44aWmTr25e7nPTcB+/OtJuncKctTyoz
3jt4bHRJl3Oc4tA0/zqknfBEcLtFVX37Xwhw0f5+Qo/5yaWNJVl4+iBOiH3VwKWpBooBfnZA5zPnfr6N

c2ZHcelrUNO2yecfdDp9tP/kRSlM8h6gN05QbAIvoWPDJwyizCPPTW51XD3tlRSOkN9kYfGO7FFC+sT9

BN6wUQ9MW8JjbhcxdcDwb2MGU+imUjbnWXD99cis+6
IkkjaKww20XDxJuNiMEXjHaRj30ebQP2VA5xT9lOcbIci/aVfsQhdakEX8npXZZJ2KkmoB7Jp87oAgxD

xPRE945yyeMfwxmB71XvtUO/aff+ji0K2gQVgaZaH/fbme3VyxguzqF+n6Wy+KMkytL6n//JCPDZOIBf

M7Qro65PQDeCqF+8fs/K6vjyuGbiHumDVYQ48sO0GF
cc2wTjsAOvCGdQSB9aiDG3OGof+IE9c9KgqKx/YGlhM24AROObANk6FSbEINlqOmmcW+cGsFIs8AegpY

euHNVVKuVBqHIbUzseigTHTTMdQXFVWY3tKlefpZUwB3oAWqgzdb22RfAht5WPorG5m5dEfJIY/25sNk

QR7lK+C4XcEdUDuUyLCbCBZdjoggD9BH9WEhHi4wJm
YIWF/l2sIwyudlxvYZVMmFbhSVSPsrBWOGpENE2dJG3uAvDN5+rAGUpWl7thE6np4sgYWx4dUtp8+dNG

NqikA/cw7dnAXOlpkqiwRhLlhPfQhXpCwJYoTFY2lw4eGCDSnaraDYXye5XJ/PtHx0/D15BtiPMvY5Rx

LJL+aEpkhrWvFAaFc1pRFV3QzsMjSmDPfvqGK/jGcz
Cjv1uNNzSCCQonX7mMZAmV3I4zqwFwu9f6feNHNij745L7aV8/CwWVEhjsjCP/FGK+NdI8a/cCT798EM

MMS8MfF0dQCHbBDQCN5LPxNlcpeO2IYGl28Snnm7xVSTSpJ7Yy6owCtDRKZzeEwTxcuRQNt21xZjkCkj

/vwyRjlwU8VF4bxnyVaoMc2OU7aS/On7wh2Qw9621t
V46gMcI6fvXpsF4y3BWt/TKi6Ch931s8ZGvmvRRKxjnEYQt7ZwX4cG6I1ekXg0JGcQRejyYtpsPYEx1j

EBO6/NWH+kvMaLLGfwMn0hofig77yA0W0eBa8Nu/Aqrw6tApxqv7I8XiEZ082ZwnKu5B1LGahKdrrBWx

Q8hRLHB/ZBnxO5UTNuj00sM4gSJB9RGKJTgzT6hoXP
JvSWKNsItb8B/H8JxC1s6W9YFg00+ZPsBV4VxGs0fGjvsiX80vENmnuDlASVr0vYtacfus6d/OhKkJAh

XieMTYn8n08graRhZmOaCejKEVzFthfoRFUh/GiwK821G+8iXODQH/R9z1U7yI8QuOrPw6la1ti/nUz7

vPXIylTR/v8IMv+PAIu6KrVWIVzosEcz7CzYg2GEvp
6pD6sTzEL0qSoYSB5GXy7LyDIlyurRSBu+e4IhLTP4ZVqXFys3/TXJVUSn6lly2KwXSbuWN919mrvI8h

fJoXLScbc9GK/p+y/e20xAqHWaocaPc0PvTmknTFUxGrkbMpW/4vjVjKdFmXn8PJJB0so+2BM10m+Julia

4J3ANoFredrvwWuKdSo6JKm6ENylsrJ829bCphgl3G
vgTMisaqNY4q/5HziEQWiCQX4l+rJdHgT3HVxg0c573Hy/bOPMpNro0SpPc0xUfjJaIz5maHW4uq5lVt

SJvioHOd23IoJ4vPtUorB6VUlt325QteJ7mBU/45hLBjsBaunlIVZInKMr44cXI14cmN++mogTQP+N14

L1B5JuMVynMotMuYczs5RqGrSsr4f63PAI69tFwhMb
QaPt+q6CUWoID3uMNZSkLI+SmzSvvubI4MrI33i5GbXzHLwVshoXmUDBHER4MkMC82pboPDK1UyUmnza

c2r2x4UXU6UrmN+IYnVE3S9z2EfaUTd/rV8jpYfNKQHM3HqAfHmcHsF/R30kG/QV/8XYg4yMV6cFa80D

bQz6osiSm4rTXynlmShzZl26B6MA/qUOhEKgP/eklh
XgZ8+88jaMO2h0cxL/YsAbyzUVL/zp9Th4ZsaCzaCpj2H+QbbO0sp3p2PfU+S9/Wt0vmEGnnEgOgI9f3

7YDjy7fqUtUNa2Y+31hkHDxWz5rfplyKLQu3n/aqyHsRQjQC+xzMVEQzWBk3tgpBrcZ8OgXUC41Tfyr7

1Zl2VsXihse2KZl1tsU+2ctOuFHJxwzwk/s6ELeFxl
jyagf53fnjX2CynNRw9GZAhjm2/gb0ubjWII0aLlKCzONkui2+sRzUPHxQo2FzzZWisIA1VC0viY36r2

C0R0jM5pC5qdzAV0FjNsq19tWdDR4OOZbiaPzAwCwy/A49blROp1PDPqjX3qO1GJrvW7E/uPoWy/NApG

1kFT2i37iulR0HrreOmQNB6l/JfoyF6f7b2nhxbgBV
/1LFmSOZOCbuxkshTM+bgqyRjiF1TGFnK0UsAFel4BQSnRBl0hgRsMmMcXPS7cpRTrLoArnN+gh2K079

oqSu/keeyBEcR51JvvFvQruBsnlI1EiMdl2XgqW4WLpKyjKy9DvoIVNb4kBLuE2YaF9FvHlAEBzHEbVH

ROhLzQgPjqn/M6pEyjKPofxR9TILPDoCT8/X5U/X+7
+ZSqnZ2yoa1+qykR+MHptOuDEPhGT/C3nA+cqIlwNiNNn9u/eAcZ4gjYbByRJyJdrz6k/Yf3DvU07sqU

d6oA20RndLPTccihlm6bmmUABbZrJuKx5gZV2kWL+7/i2uQOrJG2TvDWrReCDBQr5HHzUcQsqNvfkfkB

gfk75/8Kogfs9NFn00rmWkf35vybCDt0SKAtYrRSTu
D1rb976RLrcGt2NN/6q//UngD/fEFwDuPvticjha7dEFEUWZG9chw4JDiqSrsZDSkFjQesCUm+nvwNRj

rySF4tYon7L4n/uJ3D6NbdljxDjvqxmm6maCHURjDLmfuchAnwrzYuiE8w6E0ju84OO516PsvLmTbCa4

mrfp799TrhMUpeohDQjY6uIZqHMRE3QbPT/1SZ5y7o
Lh2Z6rT7LmBsAESHupWX9cwDDzdj68CkYxdv4nxAr4iyFJJhdhyRc6M208xnUQ7hlIKecG3OVP4LHe0A

87UgwA1yQb5y+lBkjxuZgLNNCahlnra/rYg3cM29EzevNSOqt9WJuSmRxFJgxZ9QT2nOK0SeWrpbQr3z

yicjScHp5vdl8k45F8UlkaomAIkVaLyTvT2+vcaefG
VTLAK0Dn6Ng95wSjSsgodZG/wtTBdIhCZj5TLvV+BQ1aFuPtMbir1yvye7PniebSO7ph4jAGXLfb0Wu/

Gqf3YvvKQPkbYs7E+1RtMvxX6kKCCqDwPE2lf4vTpKY47uP9N2KUlvdnZf7y8z9yHJMSeLmqlK8Kx5HM

FuTJM0oP1T4Hkln2XQO6uBbMO27EFDDCLUhbYTt2cf
aBPrdeliDB/E20YqzkikccwjU1LYvmqjGVhcJwMLDLOKzkqTWgAvWcR+Zv0vn2JiD1ReCT7qPBHhu9cn

FYcfo8SCLIwYUMDaE200DXc36uSO0L+IP3fDaF327xb40N8aHYFXZlIRRpDNBGNSk4dOOmG0y1Y3L6b2

aUFw2kZVPyZiTfJEAvoK2JWISgv9KhUV73C/lLVY8B
SkvlWlC6M/YGk079nk+j64CgN/Z8e3QR4d/aN2dOuqWCFLfbWk6KxGEMYB2/Rc5u3p8LFe4EvB31x2EA

886wM9BcUyVZsFGuZ37XqYeOSznGOtTtLZ8saN8t3folQAhSTbQO61+GwLTVXKWIPGnaLMeD7lrdwdG/

0kehH/m/L14vL5Sd8PqhroNx05l4oPcWnUEiJMXbpj
OI5pnkD3REjBx2b4c78BrEPfd99IFpNoJfVs7kgP8cXYPrYpuFH2Ptb1gZM74W73MvoGcPPNYZ4zdwDr

vH/n21GqNTDzzWCqL/23vJio3X2kzvsUY+N/TM5jTb/x5CsAVpKFHrCAp/ml/y1Lzr1r+vixrIvyqez9

G/8fEQc79GMCdWIRsM5yCLHryHpGVQ3L+uc2FL5FqS
bJNGkItzVDAbZcIZ8PTpQiYUkd+GOpc75DATZM4G8jp5yWZZ8ePf5rG8y1+xy9qjTkO10gmimKsfPmyZ

bxMt7Ditdp40eG6rKy9HSIZMDS4ICHaUefqyCCuPDx8jBQwCO0QSMEUSv/AN7hLK/HdYMqazJjYCaKru

i6fVH+60trqxmC1HAaB6i9D2xaY6uCnIrh3K4k/Kxm
73XuJ118iXo/ZadIX8+vA1VEiMA8lQPRrnLBXdlleTuQdp4k6l9oPDsQki6Da9zHYmXw9HX712oqW7mf

oWn4SP4a4+W2ywU4NrXlqoa2wIynJmoeiUHMo9f8zkovkJ/Om0OMVOwdm+8okwLFuUC1OhHXGbLe5L7C

iWp5aDPZeqcJSvqOr7ZNCS2eyuKwl7V5J02AN7XhnJ
RGwkzQ9ZlkVkp50oCDWoN91OkN25WnsvLyDyyHNKzf372i8mKjUeeHR0GpSgGM1trLWi6E7W/Qd0pdNd

plIh9xui26TrlpYzHS7w0sn3C16yjRjJ+83FiBeQdt+T7Bj8a2c/aQRjoLh89OTW/r3xQi1wl0PIci9S

JjOw6yDIcsjCVufWZQrCQ8cvpZVtt6kCUiaIQIrc7a
IRQKEyvd8QQ97YdRAokEtroMx7Km5FzlVA760imXFCmR03VuILk66GekcR9zjmfodrx+EUe6YnuPvW5X

fL714BusHmCIPM0V5lv7YsgT6ou8/B2hslCDMTolnMwtiQpjUpb18TEUlvlLv+8mAAQ+y2FTz8qYZFHN

npUzdi5DnS/zmLzLJn+nJFERZ257j0G4UhvKuf2MTU
X3wRUOgu8+gXzaF6d50xnLXu1ynGUndmMT1LEnEo0Zi2YwKfQG98fJCwBhWGUbDw498GOvkCB7OKygme

phM2GGUN2W9DoUAMCeE018SrUxTTAcTr2P5hyZ6aR2kawuimpGZV8WGTdB2h7n0RrYyQzXunAHefhfUe

oXdAosAvIemuHdfA7/QIdw0LCZ2EksmoTJJuCDD4gq
OFOp56T70p9c4WJYsiF0ySbE6BgpFkGKlYS96SkK5rD0l/84gffzhMgK7Xu1jmp25OStCtLe60MZl9u+

8NDjgPCIXxj2ArVEY6Q7z000/6mGFSaocG/UVV+O8Nh7MYoZ/nXgSxuVfegE3Nptb5dNp4pjTfC/kvJa

01A2Ro5eyCmjjCB4LptzTGfYlc8S1JH/0XiM5bl84m
MUMWedePE9UvSGfmUfFwPHlKis7HovVDE935T3zMb+ndL7K5eIDNjZznH7j9Bm7cp/Sn0FtGiEMqMwf6

v8lsx6KurutQtO1Vw3H48rGBI8MwgFA98lf38+A1WMtgNVnf3cIdi6KKptC/kgL5iB9V5lKf/LwaKFe0

mTeMCFoUDwNMb47AfNt5jcWS/vwFqierTXWc9xruDU
wLua9mgGqjrfC0jXFmBzrREhO9bYm0aTThv6m+YuKHHWjkZKxVSPVtN1OiJJr+R8EfO2AuLiYt72hlEN

PG8sYbkPb67wtA/2MAsWfMyRaaN6vBv/p3Cqz4X3um49iq/hTGb5mXgtOug3ZgzFoPCHqZFs93PrYTs/

SjHz88dbRfnVmvd/zFjIyvc7OQZKBnI5jfgN1Rcrid
6hJXbuprv6vpjYrmfJsr4Hi3uTS9RhiHRZy6ETY+CgjMn7CXjLSrRNHCcoYKt8LlBP13o5ftUVOVt0xQ

eBUVrHQlpol4XinKtvo1ZPfvdgE/n1hNJ33dssZ9jMvr/MBC1eKC/fcnxvPmXrTT9UsKKks5nHaVqnux

79/4LkQT9vQXIqGid1t+a476VygS4DQ5YHB/fkKZ/9
Cuo57U9n3ScZth7TIeL4AUAO4LWsV0PQhKka90cCjtOOK9I84oLMG8Y0a3lB3Coutv6iM0zxg3CZwrll

cxC97D5yaDKnGMULYB0GN3FVQ+NdzDo5ZjmSmYzsJW03eRMSwpouNULY0gSBn1jQVoREzFD/fLWcrZGV

I6VABhufJ0uP8FM3sf9kWT599P6IUHEq4fRnNvjbZC
k/MMX+sMr1V20qz4VauDzeC/+Qy3Woa52gUvaX89Bp7TTFtaAbhuX6jytcANgAYpZdh1JcvCF5tVAnol

AiocgJesFiWf5I1P32xGi3B5XW1V51UJycewaZWHe9WpccjutK5+MVudRh5U7bGbwAYsNIDNKMRipGCi

F+GfCiHd2F7r65rowABGdW1xqXZXv8d2ia0t+OpEI6
ui8ZrStiyXEuK7oltHFKZddMPi3tlOS+1L6BFCn3MIeJk8zZAc5ZjhjEFBLwdnazr4Kllhf1JcNg3vUH

K4Omn75522QFrblhLuz5ydS490IfStudaE5Hyh0FK9ZHIUwE6CjPvVG6cOyegnYTPESzjZpaaHPqVZOk

Hm5VPCS+Y7DtGtCY12G1h7eX7Q5TGtdLRo871Eg7fh
3b3EcS64enhx8fU3zjL0lNWA3/v72wNRizdNgGTeZ2vemv9Q+zbhN+/z4xh+CwP0TPzn6ImQ763zPHQZ

xnBDoInbGhTVLwSg9E2B3CsTejJs8NdUqTVmLF2roIf+h/zEshNfdYaZcryPM5v1LlgvrfejJJB9XV1W

PSOR+mJBx1uak4NzVlchJg/6Vv1R+lIi28RM+vbNOk
khyeV4+96Sq9D+62ZeSwj5jPiXzWKGJ18EthryYuNlDya2ZbIZ/0Gmh/ZfPuc0E0psSEcrMCmMIPOOr8

udMy072J5VP6O/J0v+mDAvFTUET6KcBm0RTH+tMlV3/Z8JaT4SfeVnYVfQChzadGaDUHo5QsGV0ZvKHH

Hzvl1laeXkh9ZZ17jGA9rlG3Ni8nusZ4/iokc19TpX
qr6fdBJ5om3Yu5vHWT26v3Xc9o0jawmg/dk0eAhhDvQU+7atJ3WWxgt/bPRhftRwa9hdVezG9Z6/l062

3Acv5d4or7o2YJg5tQv5/TW9+0F1i1Gf+1PJQC8XwdGcnooSm8ts27qzexU0dMjj09WUaGdn2kjY24Co

3HYC1xz/s+bMEANp+dG9CbMolHXkAHYAvZMUdbu2dJ
tWG2+aca3xNiUeqgRCENJ5vS/d7lZgr44RkX9UYAuguSq/Qc69Fy6ZPKadRhscTnKGUmqpyX9TFnDx5N

5QbQWVJ40i+ovYlirowZpKlMw92HGuvrJCGDtO49kAHFKI1rY9BVLlCPPTGcwhW5aF3fmFaH44gXRZl9

X+3d8ZQCpaRL9JxiGT2fvj5+uYR1uHXY5K8Vov3h5n
HSrkkt3o7Vkq8z/Sp0pQ2p89r/F6/HTwS3i4QffnaOYyfbcE0PLNuo4d7i7aaueycLMpdpCVLg/XkFBu

pGlHWefCGPKc6/PVcLxhZOO3clONiXrH07SlyS55K/1IiHPpM1rQrxgy+Bb4YOl7I63ni0Nekx4nf7dK

sMbyyxb2LPUf4NOltMQ6rND1bjBAEMTDj5d21C++Nz
TRr8C249rL0OOnsyijVL2A4iwAN5x4fC0ldhF3G7yNyuM2wRdUgk2hqxkMZcTs2p7FY1QKDGTIUwn9QO

/UBO/mT5JNAGLrcb7O+KJEnuTkXoebW9rItt33Gzb4hTbl8L5NOE202ro7t6BbtIQYhZ5nvcgQbJ0CLC

sSViPvjDLa3C4KCwgHRgqjy5KZ3Osha31N5FYLEGxU
pTF9ssJhEZ0fi2D0/boYHnfaqkc/7gEE+CGw6z5PFIB+J/kQd4D6X2GfI4NMJfAoe8BAhaf8Oy7Hw0Jz

TF3okH4OCm9psxvjabIzlF6xtSY/dotOmjDhc1BQtthvB1SAv11pfzjG8V9sSutkuJKPZDeg28jdAcJs

b6lrCEw0TXGxjEkrCoLS0W2YU28Hn3fs36izioU2cg
D7pGR7aVdJeJM7N2GbDdGCRHFHDHT5ulW8iX+vJgK1cbhNqeh7Lyhd7NTlyJufY3t7v+YKqLZ6p3v3FL

404l1d+ZDBQ7hzyNVn0V1QfNDKUTBD0Of3OlxBMttGy1WUB3OHT8yZ9UaCHYzQ6dgpw0O4BEWe+NnWeJ

cJ5aGn0m3wlbbY2N+TZVmlKxaNjC1+RvrQ7ko+tkKD
gfVHidr7/VOJ4rKvCWbgDC+jHvv12S5c/Jq1MTXC2MQgBS0/qFYYyAM/xeBWt5LDW0+O3n4zLpIDz/Lf

RfMHq5wPvXovmCpcCwq2iaSin3YPCaUGQ+S70XgE0bG0u7EsnG/3NUKhSEOl2ynnOqFR0mb/lxK00V24

3LkwENYngfhB/P8prtcORdPr/nicsSw/a5IwJOx42+
tp4d+W9vfpAVUXz5sN4CkeUiBBaOep9fbfgbQSkKgfgB5Pa61xwR9Z7eo5x//odYSneI6PrKtHya8192

Kpgjxzs7wCXFl2/RCN4eKFrF3z/s0nJza8ZFUhqFTogDCtVdWdKNr4+G/FfGKZMm/UyBF11nVODudTNT

GFb0YDZd4qhxgEcY49n5+j241fDrISIASKTAtBZ8R6
PcJN7U1MbP/AZg4em1xRzvL5ItsPLjql2fdscpnHFdb/9shJD55//p19im23E3FMa06QRQGEjKXyqI1V

Sic062XlB3LCoJaG6r6tWp0tiiP770DLxbxjbEs33e7ktyj/wbzb8u/Z76oTKFjqHfJuMff/8P5XWE2a

Od8587xpWDDgKyQuO5rebTQSkpVMvhAJsY/RMZgTu8
EJCxsmMIbqc2iz2dhO2iYqok+4k/1FwrRFo957fw76gZBBSB4+RS11dQZoPZefh/ul78s/bjo+w4GuHA

J2Py9AK611aygBGt/AotZdlYpzJPP+p/O4u2KUwt7UCZ7DJ96g210404+tR8KkLS6Gag/dVJNLzbXQPn

8Jtwds3R+eQR5BsSF5NV21fRFB2dBr8UUdM8oQXNfb
Zkp5N/UpYGHavvUsVaiKffQuwDy9TBCCx/IpO4j4MEUouuG8A3St5HgPpkih0gylcB8OS+sz0DkiJ4ax

B+/L2eyAh3kmuYMobFhnv2hngvCJyVHpdn8Q6yr+BJ4YJ1HauODcJQwkka387rCi4z8S8OdqgLNxbusx

xz8Vg5+M/yoptQuREmt6MkWJyRGP+hxyHLSXNFyccK
efxXfnMqcNvcr1FV5pvJA1nag8pGVtqdE5j4nsUnLiGbVG1Ti2P1gD/MqFzqdQYpkj4iEDfPL8KjcFmi

aY8ImAHfwwRIEXCSNZQQye7oX3wEXT8fCGkn1ZYfJKT48h73LoBODlh3Ps55waoY7cZaboGOX1NWzuUQ

k79cuWe/SqbApf4KviDcpDf8M9wjXU8ShP1zqz1hik
LNJPIPTPbr4E5FjIAF+rnqV2JNSx6LpK5ssnHkyhFMQUPpMKk2nIOcs5iqioDpJFuX7KvYnRsrClkWjI

stNNP7hJXuTDu4t0YqHffFsEglTIt7oQg9En2rqU65m2StNEzxYDA0YZJbu8EcQpKCoz+j7j7CSVjTjF

BzfticglBtGYJ8gSTXSaHRpti8030qykyxqFfgXcE7
61JMSGJPW3kJTV3uJSujQ0FhM6ZX2pH3WHjH9vLQ6x/oOs6iYR/nQTUnLYmHtasLe63ljlV5DW9tTOof

OpAUL7m2s7uhqqyKwpZkejZgbFNHwLTgHz7+59f/Y22lCNzAMeRD0CX7jhxgw+gaqsui8QYhGXNP1OsE

Qpxix1hq8Aa/nvH00x7AXoCQbbpuCB9H+4qqc6SD89
+dnj0B71BGcQapfeMe1AovQrq92FeXOjEphZpXnB/zmARxGH4ycuPjcm4FG1cX4uIAlpZ6kGMNtn5pch

gbyrnsxItOKeGnMqnEGsHC5U36GbVWnK5eq3jnnApAMVqlAtvTrUpUET8y8zv6BFY6Mz7RqfN78amPhG

nW10uJxV1Cj5kwFfjDe501cUpcx9yLE/Xz9IRAzEcL
obK7UihwIQRMn8rSyBqLnxlrC+/hrS67dc5Fsx+p9T5gn/7CXpgwnpuI8vXeHk2QgpFydnB+uKht9fql

ss1QB95ZSkK1ebUXEsb5A4DBCPx4z1bcMFrvhkCP6f1UkqfrC54i40SCaBrIWDHaqn0jKx/S7dphwA+K

ZousMQXnC1hecVhEuwp53lqnxs/jEiUCre64K3hVVL
5gycNFQVmxiIlZHTZ15eM5yGA4MWesnYjAT1spEckW6wmq0FtPFHlBcP9M8ApHnHszsX9t4SypV3EOQq

bRl+jUSz07JQWIkxlpI2B5pVYrs9V+i0Rk/MYpWtwQWx0Eukihad+HIiXDdrMcLppN9LLvGWIDg3/e8P

Awm0e+N8xMxIjH9qD3DnZp2LnrgSP11p+Y9omE5YF7
+0OK27V+HEznshBWnsmcjVQSYzebWntZt4+jDIRyCKh6xUF1NeN2spZDznKap/SLFPlfzZQZiljiGCMu

zcqfGPAY3Xwf0Ey+oDqL8AsDWtBoxsb1I1qVNK8BZNaXekJrUjxLLSm1vFY03p4PSIfW5NvrH+6d5MUt

HSMiGD+XkGJyRaeezpIeUnZgIGmYozGKO32Dphcl6B
heoFQ+YrBWfbA8hMsaVcE4WyAv2Di3N9zGoJbGXn9YvEgqfC6PIwKvR+Ah3hQjrtZZC216Y4xfBlP2o7

a/yrMQ4UALFePS85DpnYtw3H4xBNkEjH/SVytD0vf8Etw2yyXPJdjyxKzFZByFEi0RexhelPhMfs80y3

sEIDD0si2EmuDIqK2rNk0RMtLZpRO117Mcuejrh07Y
m9A3sT77yCAQh5H677H/p+GdDIoXPuTbiH6H5jTe9cEhLjemWD0gyjlXd+9lvaqhEaqmh0HT5hwhPQvo

JfxUZ8bUa8lndvLAcH1yjDFC9kroSBWW5a/QGQAixdD7IIJvwGug1KddJCb1bhrzWwpeq94nD4Exq1e3

hmCyOccTcHz7O3l4n/Yl2UDzMzEj77pm3+e+3F8GFx
2XeiONf5iwCkOy9RBy2CIvOzv3CYMeYjOqLdTurFj8xYjDth/pCINYa/NNONFk+8u+RBJl31p0Ka7kEh

hBPjIRu6XbYTlM72P9diKM19q8ewm0/7nThw9IboF+uE5iYpH20txaOEpC09hg2jfOytwIOj4GITJttK

J3KDwuNJasHW8ovO+mCAM9dzEsCIrZMw9fpC7rK0Bf
UBYQ7zGkdX/+08FJbO5zH+Mz7XrR4LFkcK19IP+onf+f37Y8yrz8Lkn/tNMSyDfI90eXgIi5LRYsI11W

W/isBr4gVyFuvTAW7juZZgjm2QHg5H1nfMCPHHS2+O9L071yeuVqxzW6uf9NrRcnuJHwuACgfxkJOA/A

CsJYFxl6yTab/1NgfdUcPqOlNpMH5z/fQ+w1FBEThg
dvW62zDntpsgtijlgrs9cVMCfsxzPv+ki2zam7Pv0YJul/WIZyCOVvqG0NH4U435xfnDVdIEkmavlTV1

SNK1ohq/0h6me/h8LiAFEMZ6bDyFUVSlG6/dtIltPdrAezEkbWyLMK6X/GHVcE8fG1rZ5KPNtHPEyGQ8

IyI6kcsXGgoQrByWPS+w08trrf3ZMwLJi/v3o1Gxr5
nJaCpsF0enm8qyfvh2zDjNfGRmIgmSGAAB6v12ldPJgmrWu08RetH808KMgRPn6IPsOm+VkwVxc4Aq2n

RYvP/IXVwrB3inQ8nNv5UgPIGdRy8H/QUUXkgCSrjQiVqK5yf5OMfVYyghsBZgf9uqbfC689GopRkKBL

FjfKTBGcfTYi40QYdidfz6nxPDGjx02GqNcj11cf3K
Xp4V3k5HrDsmSxy4JI9VqeJlqjTTQiKTTmU9GOz9eBrGT3d3+N95ayYx2Vj8+wWhTzhs3mi0a9W7fhLo

6YL2Eid5wCstEAatacWuNrdG+o2pTMzax6Hadv5Pw9zgQdFPv8/waEAYvcqKKM5xW4iB5cPHO1iUvdzt

LQOJ3++Al47x2yHKmyzNDbtKXynpajNzk5MSZmGCJn
LFOzgDLPt395Vp+FuUSk5hMbamy/EhoJ9dWaKmfCB3HNkCyO5NkrLU+y9PEX+Ssng7WR79mHysheEw1k

yviw+mqHIKcfrGa5u90T2Fz6UxXqe09xh4l1lsY7u9uXNjvuufXGNvqiKpXsHfAMQs8OP0V6WhMG57gg

4dLYXFy/df5ESN/Vuig3aK3Pvzfn5LgfhvrXudvvDA
xzlbrELH3/FHesBsqW5P0AoFNFt1cB5klLbJj72o8jQsvzmXsTSdKC243rNZqepPaGKoz1oHahrOD8EP

uh93OP8myH4H+X5EQaMDNwSe8rMxSW5eU054EsBXNvvnnHj8/CkNs39Y+W3uda3+KWD52/WfcaBfuwpA

XXjhzzIDmeVlDaiRNwGuoSJqwjv39Hr/mhEM8JFTq7
FhcoRYIQS1mzWnbGtOWwiSMk5MkFuYm567W7d1+N581y+jwN7t0VF0HiLTg/WVgHMd3z/fJXqH0gISXh

U9h1O/4Kcw9WKLh3v2Vp6HO6bi03D13xzg9jLBrunVGy7wSra4RxNEG6wHcJ8WO9uBH/+rh9HfFdSw3X

sjR+2rU1RShxVHTU2ts2vnJPJF9QzlyFIM/irWMvxi
c8IqZ2qoiyk1JaqFYa6wSloHtllZaeMjaDpbtVtCp763Tvgt+aZV2QjWcfZsuwcIIwoZqAi77/MArzJd

kPhsC0WGbpv/LDFGQ9RKO/5cE5Y5n65tmTSP/q/s/MNlGAJArrcJGhHzCUDC3mE74vAcPZfjmBQzwF88

81SvoZa+MvE2o+oxEy4Gs05PDAp+QJmUZM79zt/jqh
05xUi1Ha0mJv84YllZP/jtpfgmbXVULrZ1f/uYWjE4XR928RQ2oqY0/r+LNkmUALYElYGgpouKXwokf9

HDs7nGMkMQsEimIXvi3qcqMg6sg+XPY+DKbg3g6l88kJLCI2Ceh8MedXeiyWAJKNFt57o1Cr7HoLNc2C

Z+rbDStTaLWzktXw3l5DF4+canPsv8iEoxwGgzKVYX
hiXCrfBLRTw9q0iILaZbTfnrdV1N+dMmuGYq0+XygZG2HkWvP1jzxgk/z+6FeS9lJy7jIwSAYrQAykRi

pEpv/74yyYUjiarX0+2Kh4/SBPtBFk7qTQHBlNpT5puVPcDlIEk/nbZjWEw6yI9UEgtpMzrD2aifsU/I

LwdZ1TCFrPgU049G41OUNbNqlYW/jjRTkmQtt7kA6s
/A/6MdVWYRyOiVYb/Bu34yvL9GFfFepgWbbOEHsbb2HAEO1o9NJBi9B0Q4aMwdfMkFDE5zn1Zyo8nnue

Cqh2hoUsMI0PXjFj2ekAEYGT2QhgPeLvu52QR0IjVRqnWgk627KgkLriRayOUTFnE4jZ4xbwzdAjszKx

n8H/wkTivR0F4LKHKZ9P6Fy3A5uDSqnLO5kCM40+AO
IRBI6LUxLJP6Iz82UCAQpZkIAxJ6m/8s9v72a0m/lwBnwljkfdhl5y5Y0/EutMRcb562QsudakPhqwrR

Kt+oQoX39lPN8yvwUo2eesZeo6qzlruqZcADHU4yhdYP9Wm9cF6EJjuI5hpTz7hU7Tn4xjXFC2pDljrR

FuSulgO+DuaHJ/O3mD9smwclCZMwXAd4SjG63Bqv0G
wTpQVgAVqvLX3AOv3hZNwxN/8NsNbP5o5BWO0NWwIFS+6iNF4nQrlX8z3tL2XB9XEnxOIUPTdAO9geSA

KEYfxT5kw+ZAZrDGd/ppd96gpfmnI6vEchJwWdiYnHsartwXRrgzopQRRdyMBFRUehlQ48Gk3LhKng4g

5955QFebBNn569dzS0xtOvZne3ovjhUJM/BF8x0ARv
dRAYh8BWHJQ4RAgJ4bBeFi1NAdlcWd5FU7YzD2p/rhkAUpN6a5X2ClcmzZzylov9ddc6XsZE1aTan+SS

p/Ve9boZornOxo61ysNX9tlQySRhfzadsMmMCaLrE835YWeVprgXsnOEs/cXAAAVfqjgIBiZPrmUw7DP

v8eOoCs74rnOtfr3zb3/6Ml79cRPuMpS+NgCHm4gqE
y8wtppW6AXSCR2SR02GJuxXV441RPbigoPuH0QAoLhzy4ttNF+HI+JgpE8a+d4MAZE2Cc1qiEo4/tOSX

Kh77+4ht7o9MPsDsrPSVPbGI+gSyfmtenAKxAkllS5vxHT8ye7QXklYtM0LO7WI/ImtyC0ZT4ErJiTJJ

7F+nyIVJ9O96kLlT1sOS5O2uTQj9OcGTrT03HyVUM3
0SLFGa6z84g200gMyQArxWMtxRlS8fKdghD+NbTuY/nlEIA4W0Rm/+ADpUHjPGswebotx/7fTdUGLWHl

3BwBEPEaDAK02tWR41fdZrAAfivdcZmRVjE87spRYPRK/v8SlYWFUyeolPf51ecCNtyg2vEhwcbHqqwh

IJzfiEoMRYM89Y11xFqLRibta9zs+YLNytxxW7fF/W
VLyV/+Yu5/HmIHvqq+p/HDskzFNWZhAT5RfiOElooy97WGw1i0k4Snm7qVopOB8HeqWqfLSx8uW6kTWL

kLFFCGvtA0/CFfX/ZJuoDJoqlyJzm/q3MIdZzfOK2fKAwm/3lZPm/8p+5iH0dVkH3wtLNVyL3gnmLDNO

N2WeUt+dZZBQZPIUccGLENGSSEqb0Ljs4M5vRiaz8w
kmyK1fYTox8gK6la1ZBIGL4wAqOjRrV7/2oy/t3KjEeEEYzQeYqLR36I5jgNPQ0LrJUCz+O03Iw+q51t

uOgCGCIgTPFG7oO+FW6y9d2KJZ3YZO6d+vfBveJJBmd4Gz6m0MhO1d9q2BRXGT2KWmQbLA8tqiiNF2e8

GBtEA2sIkg+YwkWjHjylHj+u5etfRS4JGmu+AZ6f8K
3Vr8A8HIcd+5GChofYvbCVZf7nyxjd6hKq7Z8SHAF9DuOU0ff+302ukwZIcMS1uA6Hz73anSOTRw8wlq

vZuvpb+746pSq+U4n5T1/L5RhQ488+hAQI3KORLF0Gc5gMBt+o8TrsIGHWmFZKNAzSpI+Sp8YanIBo37

6PWoYhkZYC64qC9TO6Z9APDVxv/X2APDv77AGMTrzB
1mM5C7EiKR40OxwU770fhFxNzFC0PxUIujD0vqBgXSU6QR/lfelfJHq0sluGx1vGsSmGUk5ICJMA4US3

nIyoHTQY5+BA8E5JhCRyZFBKpk9y3Y+o8vLGAR1ixmmkl5QWzGklODI1od+Femrfk6oOsi4kXfntMt3h

DgNvAnw9EPneabBaKJj3HZeGbGv65WnrMMnFz99ATf
8hJ0z97LR9F50SXfGY9aTX6PeZFM4LptDdW8Pf/hI2sAi90vunkG+Hp8wAfu1SBC42mCAaxDmad22NNo

hN8fJZtX7cBWoDePUVS2NOsOmthbJ7cqucYGG32YBXDbL/T8juVZoP1TrDfeWDdtakhuExc2WTx9jHOc

8/5e13X1cSafro/yQGuaAZuDCOz6dZ6szZC2GH8+ZA
PeN9/jul9sSgAqZ0cJ+cusxcCvPeK99dqNM/6LI2QMUoxm1gDoqxLd2/Qu0fvh4+Uh/epsULQmog0+Fa

nb2r+nhka00LaYcaGP20m0smQlVmtiuutqP8l3Ow98tdYo+35x5zmTY+oqBQmvHZY1sPAeqSUUFNxMSQ

ibPT9YBayIrdlqaT3laS860y1hzh69T0Gi+r0NemjU
z6oue8OEeFzTNNaRSG2T6DtwTHd4VYYiumqtgQZX/zUjfkfc6mAGU4IqwaaI3a5Zd8QqkM25uWdMba8D

8VXSbGbLsL10F3QQ9s/MqwyWlv/PhyiQOpIIKD262peqiIoiKoRDTd6IvXri4lOBWSyUttCeIAiqyx9v

/OvLshJIwGl9Rr7yJ/z3S7Nouog+eWMW7AFnmmcmru
dCZEIVcXpV87+5c30zxGNKHekQcNMDTOnbGkVZIoYoQJssHKup4L2McUcc1PnxY4KuWoeGMlWvUpmDdO

rXVkF4rMIc7CBdQgQXGbgwDYZdvmjWIvH61xeuEquuoN4CZxhqpArmVNfpXlmK8Xi/amI5r9Eh5O/qHB

+6qZD6nXL50nVl3mpk3EUAF4c2XBVndXOn453IAUp4
ULJ4Q9jDxLBWxE+du+c0oEekvKIZJWiuMuaygRdnP0XtY/oRdoWcf/wuFYc5EgyTi1cW+x0ni3R64bbX

9Oq1+lZxaV6SXVmiDSfRTVEgY+WaWhMtYdn9HEsfQzkIh9qmkS18lo+WxtuEpIMmm/vRIkRxmMjXTqrV

w1C9p07y3cPW0t2rivMdeJ5msHMal3Va8YYYZPrRzG
2xzObjCSy8hs48VdwpXI35HilWEBzs+yh48D+7evGZP+eZeziOVf/bsMwfCZC2nERKaD1Eie6y0cVR+e

iDZq6up3F75LGzleGBKnIMkSqTaSxGeLC/bEc7+LACktGwus2HJ3ZgoSBzYmTw3s3oi7VjEhft297iLu

7MmLHDR0jzVpYVpq7YjfZJ+q0Y8X8tnyWVnsEcuD2r
TjVJ75If52kHdFQ54lLEcwT/yep71KH2PsbxpEP5qbNn19sAHPthYMnt6I0gTYWXUorXCuJQ9CNS9BWt

StLviBI0rTix1rC3D18HKRlbO4uiraG47aDweKj8cydka71khWWIfUhVPlm3viRnr3RlCz6gl3mDbE/6

lIPJE48Jn1XrPUkYLbQR02YauHUJwPmrwnu7SlgN9i
InqnpcxG8kP5ySL2emkIjrq4qmXyU/RItB3fFXSBxe1m/BoDO9Hn1605NU/WIQT+8uNudclGo0Qm1+k8

cF+9ekSD1L/4FI0IauIxkx39m7kxRB9zktsnUAJyd7jb22zEZLuu8UzF0hVpa4Vwa9SQNxC4lgGKnuTV

y7xr2TQ5LczIKDku9b9m5PhBjbq2RsIgeDtmS3GaNB
6fqJ8m7AI3EB15NLtmjh77nKskZEpY/tf2IfqeRqQzej5wmWZkS/AIXwO3PUvrnlPyXv1MqTO2vz7X5O

p8Uj3dCTOhlw3qqp15cSR2YKQjxYO4Xw0nFoPQEjxA0z1JK7y+VNgUv5HnfveSeztDhb3i4W1hkeVTcK

7LJzbPXKVjzcVCXpLdSK1GxXHr50yDJXytqd161CHp
gqs6uTkddm/PQhlznlkQqHqIFJ1ub5m72PXXjs73e3RklNXMrRl0Ws1Z2YdSSY8o4u1CiMgWG6gXXsQE

4x1fSe4HiVUoJ2XO4T+8K1g5496uFpFQ6d6Qfi2XmemgSv+kWyCypp1CoCwZU8YEYxoepbc5uIjSdVj3

9vpPoRrF2usSwD3r0+zEbPYWS0LvJVHfwJ+EMePysn
ahYhP96hnmDzfGsg4zLcR+KeIvH516l6e874adEe52UqjwSOfrL8PRybV+I62V0oUK61LF/sOAaA2Zyc

XfHsH34mB/ctd4bHxVLSuL1HU5RmIfwSDngmx8V6bb3mDPZ7Fq4p2QV4rN7sZBezlQXu3vbEYKqJ0u6l

SX5DdW3rLE5UKD6eWC6go89CgnU2o8ZnICCbleL4GG
rmIZHIZHk3tV7uv0TWNI7Z+tCVQ9aSgalHKMRKKdbCCY9hytpT3S2GYSjiVMxzpvo1DpHkBi8f8SYbbV

d44k97hbSxq9jz3+bi22NYAtWFHN1E5JeK8j9spmS5Q3GjVRHGm5o0WJKmbw4lvSGPLtcdcaudbp4VvE

yh6xJjPg/WqECZxcdVGgRf9Db94//w369eGw7a3mY3
xScfy+hgSbWKo6Oc3z15P/HiEAu5tE90inO8YerEdClTEoIHA7h7pT1HegtTchQo+m2VHN7jyX08LeCE

l/1eGdr73na0MVEkZLdWDC7lnv/si+JQpM1AiF8cot7i1qIiOr24XOxf8IQ8u2Hu3U10xQtTcGPtqGKR

0OTwFsGHySlROX74ZnrbPA8pCP37B03yrkdNOJi3iC
XiFZDNejOlQ9VLuA9plgdipS3+LmmgJe43y4/S7/rWSPu+kZJDuvQW84iQccejfhWlnG6+ahW+h6WPK2

TTDOwsrPsUCWTgXIge8I7fy8wROi6fO3k3R+EPTlBVFu5vTWVDzGC1xShZoEg9Kec94e07AQy/t55Fd/

UFYiacDNweJP1GyRr8WCQwfjkVzfDOs5vn/tjvColt
8Q7lwijrHNzfS94GPNTDcUfvN45Y3V7hGFmJgdP+GrPn+3OYzuQQSCOa9G7KYy0/DJIL+0fvJhfnRWjC

MInIx0A+JRWrRcQlErPQgBcnDHeS4hV7sjZA8q2dlYxf5iWPgss8sKtdd8RhnDkvVurH4D7yLyj5+4Xk

vdQObS4GpzOqomKFJ9wOQiWN1mp8Pu1OpvEMFoB76h
m6EvvYJJA14YX1FAFkDxzVyMJW8pcdocFejI2aafQf8BDtDw4UFjAS+qRfTUZ6Z/+aCIowPPr+yZaw+f

uLapJZc7BxbeMX4iNJlCiS+GKVVv9s5caCBNUbHTT7XulQGdlRt9Uj001K+sLV+zaDr+2bwjqRqA+i9u

WShtVoKtzzTVya9ZBUDHzL4DHhEDnrLgo3lNRnUIg7
lF7GIOfocXhXiiJVO+7GJQLt16WrpP4de3w9ARh//+jIvE0Mz0o9AAPSXj5idwHIU6k+xqS1T2Yv0mbX

D1yMSXUAhSftHyqgf7Oa4NZBNBq4L+PYu+qTdHyv26/shNAVKo53Doi2k6tv4tyeNnhTPI1xMEKhClZz

WkOsdablCP1kPOq8mKwMueuS9RxtSxbnAos0PsKXNN
/+dn6vNvrdZbwSpdyiyUXpEjHDnnosj/AYKuPQVr8zibnLeqt+5KNGwtkouDL48H0fUcPN1v7Yy4StD2

hYgobAJIiI603r14xHXsMo81D2K44QlGfFZv/X0YERHSCxIYml0YGeiSceGYYnYSAHqdg2G/LjSXXNGM

DJad3yrxV7de6CMlbrTzMjUGy7AT2+Bq26EBtNZCDa
C681Olp4JlkNlrf9JGQwUK/XqfUOkPqFMYt/gNzDqNnBlfE6KXvsRfQt0KBc7WhOi21CwG+tb+CA6sPV

p5Llm0RlBZ/+5a7I5tSE5+TqSYGc3L6BBZNCi31xxtE/j/SPYPw0lVvDrzFOD8Xs9eBWkXqNlPUsIGUk

4gTtQ+9Hf+Ti8rygDAd6TRnci5wAWQyVphR8S/LaGh
DdXYl2zWk/bBrkY3ryjRJm5BFCDg35Kt3wAejkzOfPMW0uvVs+RgvG7e5RMk2wZznyfpXAFer2P1JFh0

Bmjuniya5PVlzCz8zy8rGxmH5SrAEgBCoVV5cakAW059GaVxVq8TCjlg/ePRdPHLXZAejD2DJlJ7LpT8

BTMiOmfowxH6suz1CMIKWDuldSze5GsAzlkRL+CtI8
7qBK8pFAT2MQXSl5libyuDyZA2uUjOo+9dpFp1FKjL8/60SrtAHkQXCSEd3jzGB+l3/D/bwUEWL77vbq

5HDVdBItmLQIUvmdxqS6D/pXIlAfL6jv99UUBjfjJmiorVYKtnN+eMkPQuPIcSfLhdiBf5nz92Qec0Tc

O6xmUyhe5NhITJq5VN+f7Y2AuHwm2le0AW1RzDiHi7
CltvGcvIAZjBJUjROqbYehukTy+7Ycnp/XfiiC3G5Y30v/5jbB6gc1ZSAoDKcdd2Mb5UCioOZ5CJg/Zg

dKERKguRK+6vFfYqkCcvVwPu+lxrIUcCoNTrHH85YgcFdban5QTa4Sh3fSTm0afdIrCEV38c1mYKVyU/

kONHabi++4SpVwFNo7uEfs0GQV4BAXDk3enF8qQcll
wKuxSAc+lA4yelmR37CNedh2sdS5VNFpCPDAXl4KML9BSqB7FQCIWeoRXL1ghWpOX9m8c1pd87zHqv9B

XHoc+YAjaWYAJ8WsEBb3S6/auP/OZpKH3tn5kZsRmwyGovt1WtJ7fTKMR6P47tEyq+ELS6k+bwH4MUzD

W5SWYfqaROxUgLi8sWj8zhGPNiOSfcGjtgOFTrmClG
3CQABimsri1EnT5AY5OARjp5iD+23b2nzFn9/3w0onDn+xEM3QufZCtO1UlxDSa2pVBrSy5OCD0M68MW

Zb55dlZIadv7ir4NHjB2cABGqkZx9oYm+7xx8Nw8H4Qz9U7PJkJu6RsYnMEEeHi+qPRxfGtl0dio6xsp

S6ygNLfMKh1CqLLU05yJ73vnBCrx5vnMTQAeFWncvX
0nj2qmPPvHbfvqFJY1APhDX8eZr/fXHYor6xeJa3lXtwAnrnB8nYc+W2W3gSTzZFLI7PPbXpFTMkdZgL

9iIgZDkzqNf2NwMolVwsI7+C2CZ3Zpuz3Q+E+qtpSVIjruUuk4dzZ6S7G+UzIv9nak32+EqiWwMbeCCx

0OavxDFFjUoFCbQ1ieXVFM2tfwWuDQIajXrSLr5Fmw
oL+UXfOGjeawmjazFVgZYt539vbmqu/dhuRFQnVF8y0Zshf4Fu1rX28UQr2eqNmOJh/ahrJ6BiQ91m0D

ySHucQpDArNw5qYq+5uFe+TAW0pbzy1ULptfQKAYZxNoTvyZQ0EC+cf1IZahgNmmvH6aqnYXrC8A0cTL

lgenqSnXbm8+17g+2wtRsBa4w1b8SQKCuZJ5Q16jwW
H5t5sZgrUcMMrTzYTdcWrXVE3I0WfNeroDT3yRwJpaC2yRQzMM1RR/Z2pJ9L/wPyG+GBpG9bbfBP5sAj

o7PudhZEOOv2RLYN/aCZsADVrh2i55+/vIdaJvYDi5ZwTSolWDZv753n0aCM4q1gG4z4Zunm2TVNlbKY

pjj0jc9T+ux/aIH7b1tzs6Z+LVzsPmEJE7f1qIpWGu
vPjnzoihAK6Y1T14pHDkCqnWP4TAOG8f/Q9XIUzJg/wsuCBHTcrSTwNInTyd7tpruBpNo7u+oU2nOuDN

psIqwtG2NbnOmhjgEmD/B/hsZWtGNiFKzyYGRIYhwtHCWPDeJzeq9Fz//plU4I1kmSmt5ePqpAoRaJ27

5bCHrV1w/fiSu6k68qUn3cIFK26NfvkwZyDtZaquGV
sSVkM3FYCDap5Qk4Ubqfk1mCuvSNnJaDF2R8b3o5p/qif//mzzGySome22aRca1XOq1LFtv2OlZc21ll

TGGCCMA/zfP///AsUOPXqkiZRGZowwfUZUAl7IO+HFu/9e8NSrbrpZfIUEvRJYlTi7EdKXZBBdPNNMYI

8bV4GypJkUh++wFhUf5IujhjrcCg2L/V8jUjz/vdkT
8/kmV6/kVPNvx6Lwh4/LLgCCeWuex0wy4qz/tncghk6pd3+FfLEX3vZ2BjRdbao8yMLREd2UuVR4xx+b

fv7/SlxSnObNSgA0iy65QkrIK/bFQnwSnFVTyBww5jkpHT/RU8g+OqQ9SE2lAdLwNi2V77Vvta/qF+NU

7iDVNoGbgKAoE/5qfEEpQh78oT5EL9P9XPQSfSYcHS
Vtyd3+ICcQjIFoKB/o+PYdxjsCyjuBo5y85A1lXLMLaGO42u5MtJ2Z5W4qxdhkepaNR3uGO8bQ4oM8F1

9kgZ5k0P+f29tjJJl1HNZ7eOmsWlFrLbEN7nu836/+ci+7/+BKntgkoBpKWFv/90e246RbkDCmL4wRVL

wUIgcUfXK5GRkO2cno3sJ48PabVUU1H3AH8jv6dTI0
7wxpEiMsU82puXePXMUTZXI+oCcAdt+w1VrixENbRXqHawNxhfoQlsC6zmv/nkBYQQkhSkmrqaOjMYXx

+laWerunC4nm8S+kLBz8G26utNZEGcPnOZvo5a9XXXTlEPjWw43dCxozeFKrLWq6mwbv4NvYMeOH8kfP

KU2o1BdovqX/ddNX8+WOlbFlFqU4PATrttyQoFLCzu
ep+hv0y17Khm6KhDcRlc2HSNHew9bMH0YDNjlMmWgNfS1zM2RviqkKlKEGkrnZETQzwiDD/xJMpi0E35

p63tb1/RDPZVg6Y3RnyaRil+jHeo/Sc9XRZi/VcvPvw8PqVwLalf2mm/GMD/0aW5MaBgYXMznFfZEaEf

y+WRRx4k2j9zta4byHvmmA+xabuegAPvSnojLRLBrZ
4CWY+v88qmCo3CaYQaGjXs/DaWXL2xAqKlmfgepVyryqu5nPb31DlZiTdPvdgaJArJ/bAncXaT3k7kl/

vSij3y9vgdhLv83IY3OsDoZT5ZSmdAn3JHkZ8N6Km2T+F32q3Om0ZQd/obZHbuTIf/mfJtgLHBk8O6TA

M/ZcOANkicDU+0vb8lpvbF7ZOMyNifzh1N0tZdFtwF
4ZWF4hRASNMUNlM5Z0hs5QH1zK7KNLlOhM2sbibphwkZQyufCR4zD0Yyg+UHBntLIxkaK36haI2nqjgE

NbMX9+icMFIXPsUBWf20XxrUrgKMoLCbI+Kio9JI40VJIPZ4OknaRXWrP1mxMZ6Rp2xSobm/Pqaq/HWY

RktDYrBvD+dSAJYpDVgJiBkq8kjt6+0w0pE60iCV6y
w3yK5AF8IE1s+NOYjG41Fss+zheMu+TFDBWCfhRva+EqLItT7Ta7aDaxK2iArOyGXl9JV1EvNFO1rYtA

2SNNGnnXOfNNNxeDn77ECD+w+fPm77ytvncw1GlxL5ePbglYSezTtvnC2UpGiuRcTm3jtJefJF5CX4Ud

Qj5EwDqLTTLaEzj9SNxNufelxrYLug6SJFZB1ssqdK
CQLUnJcuxl+/J+sUoG5KxOQ1gHXXD1lqceRhNRd33dwcj1Kh0Y9WaFjWoeQLHuCBMuSXIWh4uNukG7do

GWYYhxF7Dgw/SR1zrisLIcUCRYsFvz7TzQ2z9xI2oDzcLofvI1dRF/L2X20yrh2Q2PZUGYXL8x1SXkdV

Na0feMzy5kQrsllQ2WtHDbAGxb85j/w0VYBGry2xZD
ehuSbJ3Na9LeLSR9uIaZLPmR4kuxZFEvefig3TX18LatOJP9BeHGPvVm8goLtITJkVIe9/TSuIFdaorS

c5L1iR9n1J/An4I7smiGTo4rrhDHzE/R6aeGkvaOZxnKdo1oCYOVDSR6ZuxhK/AZ378RGbPVLVQk7dnO

95xAs6eeF+PIjWM54vQjsv2eYW83aStJUeiPEo3OxU
UAOCJxuDf73HxSzE5X4GWEIfrDqLg6uXSs8+UmtkIXhIFzcqIt6KX1scPEu5eW8qrTAUpKU04EpXLPsN

qIuDaH1/iM3kKT+BDijfZXWhLJDCXG1hfEDDBf6Un6a8NLs8cJFm3W/vqoWU9/37CF1KkzzdYbWXAqLl

gaSmomrm+Fg4qbmXjGtSFutyy0vtgeXTDTp5WiIjso
lQ6lRs1E6XN5U1U6+TCKh1fjq6/T1HwmHhC/sGuZouaAgzOWogRXniMZAe6zulrZqW6ba4RXl004uWpR

b0Sr/B6Kz3O9MgvERkxRYc9PP/9Xred9PwXRqL+x4XIOJFuklYUXO7qt5k/YFGG8RaWEul8i4lS+k2dd

m8oB9s/K8ZnFQcQBPcqxiuIgi/osFwAizfTHm2RB8M
e2jLyNtd2K0ImVi7XYw+nugKNB0DWcwO8Zd1oipJXmD4xvVTB1+w2BCmTxnTp0M1A25GXd+D9QSwlyHj

A5xLXILg/7B0U1osEt5OTUnY0xSHPB2xkVrUhIvJfZQhIHQwEkWm3l38lpBtyoo5OY5mki1dLGKTDo6Z

A43THHtcyv1fLn40TPWq6+AT5m/w917MADI63Sf9yf
eN9ctv4C10hb7ZaWcqNKKvLQLdveka2lalnYYVRRVyJ61LzpltvXFm5/fNb8qbLv6fQdMX/94qCPPWmD

ZW4fJO8SHNev6AZ1nea1wEq8xov0qX03I/lTkHz0vmwmgA0h9yBHvDS95lIXXCsBt5GucKuo5FKsclGS

XRQtZUcTg8bCTUPo7t+u4SCEhpH/wW366AJCyseJJ8
EBPqa2zvPjDYJt+wp7KyNs1RZCZbOlFZ62snn0S4g/G+DuktdIh/yzXMuTNoPeoIXrpzzKdZ7H2L+0Cl

FHAl5Rm1y4l3XUiBWEEBhQPW20O4p1FKahaDhTPDFhLsi/x/juN6omzgHi2pG0kd1dghYcMzWSXfCvax

i/B4vHXl4R2xntCMKKrdjSdtg0iuj+LCE/DM/Vf0ff
JE6r2JMlMrhWMcYONVlJZq1jzbhhlVgK0nvEza5UxE47wNy9zBIcZR+uTdiHfG+V6Kdpx5j1Ovn4+Gsv

0tVOUHSETdM/Adlqy655AgThFVVXR9V739EHbAjFQOyLe3HJKk0p8jYedVVMFimjtIQBzLCToOfDMWK0

mQ41CIO4pOd661cE27o5dioXDQ8GL+aiMDo6u+cIBv
9Vk4kpf0xVtju9SB7uBRXyk6r61Lo+Ybq6B8sF/0OT6VAPX+x75B/5hxM1rZu7/Z3fnl6lxlg8UV0aKB

NfFmjo0WFJjYjNrYI6ElYu9n3crsP3iS5el3mKCzfWs9UjBDj/Exax/L2fuZy5XCOFc67ofboVpYad7F

vM1+wWThn08COgfzYF5fauzTaUqoK2u12NdNff6cnu
9c1SwQmIFhIWpdr4h/0uotYRA3g9u0VHBExdWH0fDvukxqUDUMqAOLEMoj3OwF9NN4WR7C/HV7rcv/00

4FkMj1nxP4pN/1Vnajh6AuXOTBVgnEQEo/Cxzy3jQ+/KhBOIP5nbBkh4ZyuE9S06Fpij7hz4xkGqsbKM

rw0r3eyTjgz3llTY+WogoyJDFQbLwYnLjEXWJ0OkuW
wENz4972mtmCZGTgyYweU1l9VGbB6f9Sdw0d7vrYMBYut09/kvZnOsRjnGz43cXUL55HOVQ/y9ePoE+H

biltAAyN4NJVpyqh9yZhQKpqmdHap/iiY1nwFIGWc81qpKL3tXW49QEGVJjUZJzRxq9B7U+EO3+eLQ7b

WRrn2reVhMbtPdpoWDi+roIiSbw9buD/5TeAzN5z4Y
Hl+UljpNHrcNV5C/GoOViELOnJg8C5QVLMAn+YVCPBa4dEv6HXaCkWz5Q3QeMjz2spk+PQvVHyMjmVgM

/vQuftyV0T/GZmSX4K5mPdFlqhdMJWDbWUPY0KEZpjR6Ipz+O/F62lDVKB/gNwv/Cgd+LCxDi0LAw0S0

feUl3f52FnRegGUZCsqe3vCt/MWTLtLPF4ZdIUEWtt
ucj7zYyKRxdmiRNfFatxZixSji6L+pS8dNZUpfAeka4OzIQKu1xBJivoVWkXGMswyCBQ/8E0bKmlMLUn

5aZc4G9ySc9vnuHQrLMcCUuELDs0dni65lQztxxqCsp7SW074UDaVC7tX9wR/wpKqwgg5O+10kr1/TA3

Bo7M8C+kZKkopwEVAXQ2UvDvSI4fDVMS4iluqiles3
bisdKDHLyjxVK71qLKcYE73KWjkH39liMidkF2pYcggtMpYHC3f2E7Nlxje607bkVZYvClsmbjgvDyUH

nLn5lqB5YkrPAOzjrzhOWspf2KyI4FPNy4DG2TTTl0anR0RR2ATlczu6PDgDt6QL9OhLuNzes01Jmqr0

IwvbVzI0V1wuKFa9J9LzynzkRtCaHPC1toQAHf2aVo
Cp2B3/tI8VALbzA7px6Indd1SnSPw+gzMIAo6/p0U7sCtJGhlRbiwtA1QdGwiecQBIeW4p3TmeIQ393I

uUY+oCilSI5u8gnwcroDk1sex62HcQbIm96lrclgND5Uvj91y47g49ClCB1QljC9HITFyAkk1aDQX2wU

lXuCLsCxekQ/gB1AtLf2Kg88IBYUToGhq2zrtuE7ha
LI+q+V5MWtxH4gol2Sx+Cpro4PNcGYfE6s/tlnh0aSWDZs5SKQfFGLEtnPej5XUVjjlwteZMM9StZx8E

wRdkT7wIKnri9krdgjiDR52IEdodzoI29FweePvaN6ITU4e29rElxUdf9NJ9BAI9PL5oKHQi0HMUyIcX

JlpKTE59QkUfpv364Hnu0Gmi5palzcmtVMQV38a6sO
3Wf+HYsTqJxzc6h1M0BecafuYWjQBx8YoBy2q1uZO3RWVuW9OAHZkAR/WEZfVHeiJ+ho5Z9OtZZ5QxM6

yAlNsjYbw/QcaRVwyk2k/c6Jzo40ctmNwvegcC/Y6yCKcJgoAIhyEL3R/cLQs+1hjov2RFv3G6IvDG9m

39VM8QKwqAZP2L/bB3B7j5IxmOd8iNNyD1LfTt5wOk
+JLev5vqrDFJgjv/lzRMUsmu7MciRyDMhO3nvt5mH/znX7YyqdECs/cvSLeuf594JPbX1D9+h2Qohe0c

J/0ocXoms/RrPtYXpxc7pTFcHRWzHYQDKaapwjKovWqDZZruFVsO9ez7PSOFwm7FiNPSAF/HnHxV6E/A

QliFoAvPLJpHG4rGsKnL4eYF1uqP3c7VYVndewoRME
tjoUOgoXzjEOdt6SosCrk7l7XIcvVGbP6jKhZnrlY9foNTq40wtlfhGi+J9oKbBdc+ypLKUIOVS/33fW

j2Tens5f55WI5blmakmAuNidQk6eLE3XFiWmFj4iYknGxrBng2nbB8cdW9zMd5MLomBb/q7vF/IcrHlz

P9BgzPRCnYcAaHWukpaaujoP/x8Q4FvOCLilNI5CrV
ANllTGFPANJJT++zM392Ay2Xm9a2k4wL9OA0tJtuu2td/xvByUHIhbkciqoIM6swmw+iL3fgK5JRA0C9

m2ZybGeCgj59u51b0+QAESdlbslZ02FPja2207L9aV1+fDJI1r00zzEMBi4f23+Q593FFX41nz4E9++D

6g2d55Ipcx0UyZa1kbT2AVqt1mRokwzFwJ7hm0EUMI
nfK4lbCNVNnCMsWBOxwV+1mwD+N7KvTSgl5G0VfpuAZwjoD7yJ/ha1+m59z/NSEJ1tk2GdjPJ031Y4su

JdwjLrs1PPePfVkFfJCH/icZv6fwAq8O5sRw9qKdyO1LcQkxVfEzTgewsN9CdakviCV6x65nnuQ47i6/

wuGsLFAuXxTXOydJMuGs2kXnCHCCeOQ57/llJxyJbz
Q+emH8AjXHMrrdOA+FwaQp8XXRdSXc2bUXBGpyZfeNbmdN0gcaEr82YDsEVC2CIQIO960NgJgVwZa4UL

C6H/HBLMJokUoAqIuYEkEezX1CX/DYoHyFNFAWDIQlH0VOTEa5amhN/2MR9xKuCWyEoWHkMPmmzABq5x

U9ZmgXbg/elue/6x3pdzonkY4m+2EPkUGtoqYnnq1r
E5s7I5I4FubheC8lEzFHUiEqsLtmRTekKeWi31m2kAANI2Ieq/qKt4wpgty5YCrO2V1ingMZUNTuZ4l5

WFL/tonI1A67Vy1oWtKLkdG8w0CDds7FF7l3aozitJA9HPEnTysQAZTJqB1VUHDbJXB4B1nSXe/XQUJv

O8DJwqDXUYC2jzWuteJfI/8Py2Q5Vb8FulT8eLEdtR
tujPp2As06kjjGS+qWsTrvLIGHJdhef/xkD4eUxYW+zfdcEWOmAP6OgRAsiqJWiDGMw7jggX0d5PGaWk

H2Sg4S83cSRq7+/0IQTwo2BD1qfBw8y+9QP4mrff/s8Q/13ZJRzcbwTIMLeDvTqLN6FzvRoU188eeXfv

aiufunvwv3c5LifJef9dtYlTzku8V4dANSUADlz9Sd
U3dG0t9nLral2LhkTUOkAB61zSqlcHO5wAPO5/L5llJFfFZ2qYlxywNo+u3KxwmeZzx+XGVTjwRRC4tR

l3G38dd0Konw42poY/uuAfXVTl3o8f3zJbPBIi7JuMC6VYfiGGF1DxUbD6v0/+Uj6czef50by5BPgQL1

AaknomlLeXM7fsiq1hfBK9joKOwplt6CmvEiwdYkHw
eSbQvjSyqZZ1/icjQPLVV/s2OK3OdHL5LzdzpfUUZPGO4710tq3eSMn7YH2ZxbVFs/5/P1cQreI7obit

cHyS63HUibj1ThM9dfx9gkwI5aAGd8N/pmqSzqtueYk58P46fDrZkRiZ37hKt2EkHXK23kjrUuyxFRqQ

o+aLDQufViCzUglrxyubUzs8megMTfBxVwHkarKtHH
Z5Eq7aL87grngJdKSobw2q3T/pfs9T+O+LHh9azhoRLXYBk8+bM4s55AU73Wl1jYJZ42TF+ak5Tqrqun

3/iaN3XX9/mT5IOzGfdlZRWFkQ9fh/363EUNnyXubn+bLn77foHa3ycA+eTcPXAkbF6V+P0vmSFxzwtz

Qy87HQZ4uzPHgTyJ7DNFi3vN/9rEFX2n2+/R6N6HjT
oSxmNBPZMDsXmyQI6bfj3yBLR/+DzreyD/bAmHhfffZtn8ha/FqPxSy6qQmrM0Wn0OLdVs6dEkqyn55E

b8mxnkS00wvq/9KJ7YJzwbjM9ok2yKdUkD13+6neWY97tNscHvRJ6UFclfmFYnISHEffGeHxg8ILpgZ/

Wlj/X/hf+I0DuqctrlcfSqrDULiRIkI6vGwbmEskg8
xcmJwN+HAm9Dx7tFB2/C/lDwmOwSbvPGT9mg6Ua97Jg0ZystOmXar6eKfoOZb7gNHFJ9saWNScRmSmbl

MQo21eEqNC4kKlQLj85kU8klfnuOmPbPXftJgd+537izykQjuW98mtvvjwGeUx1yesRmqxF0QI36f/UF

VmxXMUMGulDrQ16rcHUMfEtyi6WoyLpHubel6Ds5hM
o4uY6K3fDFgr81EJKuVzUO37O19TSKtGDuOI4TM0bNbWtvfRdNrloSP9pxFg8xlETGaqI7fE1YZAMfuH

OAcffB3qu7P9PgzMfiHfso82SIS9blEpCwUW8ePxTJ5qBbnH6erGnaa57GEmlemDN1cLBCSY9ph5LtJ0

Vis0qFHPfRsuY7uAEFsLmhu7LvNZLZoga1Kp9USUIU
ofO8S2vLXVdH2vkn/GTJ5Y7QRJHojUtr9bgE61AAkvpSrWYmPh3MKPAtWe5iLg2OhBJ3i8cWD4y8rgSW

4JK1ApirtrTOfURb7DwZng7e0qeTFGZnVm0ffBrfHbLKTCIoz81uYGcMgktEGC6Ah/T8OUf0bOHC+PX/

nID2YKc5Q2A4BXNHlwBG89GuvIQaQ+vzLvZMAouAIe
RwEkjU9iDODX/zjR/r1SM3sCNNhoy2b1R5ZeXCFqsEI+cUCYLmRLyoloGDzcDraKDUKiCJgLXgHvA5Ir

dK6nL7986ELMpDjhc+m0rcoRF7B/uqC17yXCkT67INIMa00lLalrwqiHPLFJvgfEmxbytwucA/ACCQJG

Gu5Ko3n/2P0GJ8PBDcCZEXRT5q2tMiv+6jX1k0oLEh
COoSoYWD98nQwWK4Ys2QllPHbMFQ/XqhuMktyl2cwVh8Q+ShAqClvzULf4RgmwYbfJY07DzgfrS6CtjU

boHQU344g5nniC3pDNyjWzavqeiDFd/Y+fg3fVyW24OOXf1cuk/PZgl9xRhHv5Md5pcYPY7tQJCEtxO3

oCrFpJjub9A+bQ2zkpXsTQY2SF0FoYkvvvFdLTy6Px
L1cJXEYlc3j2LqHx44IsbmY1olImu5t7vQioTgDRwX8UWj2sRnjhQkw2Tor7/OLYqLMNyR+bk6+8sbzy

zqsXPNpATS4I1nS+LNBZyxh1OQvsXkC8Mog1yL9q7WD6L/vTClQU3Az23FkKkP/ACs1lnBsP55qTueIA

kYDMj6RmcrfQC0BJOYb8wCywxdbSy0GWjG9bPl6c2b
Ycy017zTNbhKaqG10N5dc/sgLxD9uX8OwxaOdQAZhHMaRPsrS2kcQZFbOYJGB3fa3W0hMS7JLKNYYWFM

+HaY+VEJ64bK1P9ignV3LEAaHL4R1vEbqGHhmhIqCdkQxwqkY9EHwowfQPxoL1ypy32RIZnx/YaVRg3f

ABUV5r2bLsJFB3N9XkwihsJqYaRvKSFWaG5p3s7Ykr
ad+A1SlGdo9lvgMh+diRmw3WRJrZC+E7dJs5j3AxUjxf7akSGrsolBplkfbyBnvMIWivbavWBWh+7uhJ

4cjYOtEiAYwx1/SK/RfzJ1Vfk09zMJ9uH6lfC7DnSNdmt/jmHsEeYlMz0l3ZyXwKIXhMpOw770Arcyes

NjoeCeIOrIJO7Mv8O8qxn6cK94v6v42UsR24gvZhuf
U/kFShqGUiu6quHpCOjh21TjyAk7EoFFofiP/jV0dnx9VoIiAXV70pxK7OHGoHHu5M3XhYTUQ9UDLEDm

t9dq6T+hvPOqby3HZnkOZbsH9RGvuqk9FZFHcrCIvkTCYfQBkCTfJbkoAH/NAnhFV5TuPuhcKfBYmI5m

ARsGdqDJaLnn20yvvXFX+TjUsYzZRw5Yt0m3Ts/SXj
rLxU2jgGIAqUuLNFNEvQZL3fieocuCbLWd8R1I4xB1aOzhzCtZ9EGZvz0dPZE2eBEcCnrDBfBZ0xs8zB

b28hIBKiYAjNZLMRVaw7zQ7VMZHleTBFwmLgefSU7kzVwWn1BrPOT5WJ1pIwvVmbYrsxjktr/0uL/glA

XOw2bGkOUI4r6xL3vkSgu3tnJIuFMqPvxodUCJ1VrM
UrSnR+phiz2xH4B4s+xOgDHCkAqnBCHGtAoc+07TsrBEDo+1J5bw2PkedbhwoZs3V1iB/3hFQ3PZFGdl

/COCh6MF/7PBocwJYsBx3DEafPoY6yPMP6izS/V3kMfRE8hWglKfzZLfxidkA6Q8PMg6VGTfBOvoYN45

ZtrHy11uMAdOXBVxUKwwRlDSfAqh6roCvy9cZzR7Hd
kVpZOe2hgrTAPWXCiHjru47jPnzIFg9j5hPPACfwT+q3msE3P1V/sOj7d6vCrXlCivViyRkcjxeeqRnb

93yftHgjBN5upYe/bmvQbEIIp3+u7DlY8IA4IFM1Rg93LngSfZ00ly5c/Pvg9hTWPbajIz9QJSkXuuGB

kJkyjoEN6InyheSk7H6/I9XxrJGjGTsdqzD/rwB33e
+ajJcKZtLBA5aTV+xVp29ki2wxQqzZudyprhR/JwJboh8QBU3/o7r0fpzypimke/9a6q6F6d/5mzwt1s

qtlTRxlW2DCfXughJZdrdVZWRKf45HxbtxzXWNgdt1UNA7BsPHh4p+8C/gsyI/kY5p1Lh/G/D8AAzMXp

JO6xLrSMYVMYcP28EeV0CvuTHZmjRudj4nQI/XgiyU
k6eaZwt6oQ80tnJgR9CUF3sik0a7GDU3N7Fp+V49B4TPML1fvuM4hd+4qiEPqXzEIcxjWetmkX9FjqIK

Tlmb3qNR26vnQHvl1p9T3xo7tlokVcFJYe1NYwcpBaw64cye5AXUs2J71sH54IYMVXyuPQQh9/gyD/9c

+Qt8MmVKTrR6JArYCL1sYS2BVNhoaQhNP5dycViL7/
IH88Gd48QtRwyOyRFQOPRhqQKyRfAv+6KAcsvLoRdlAoqsWWPO/kM/KjtQgaWaMmA+cZ1kyIthccMN+r

VF0HepOqticd35hGFpfxNMza6dNzH2Nk8QFqlvAGsGoFD2DT654TXLF6oCw7ZFbYmJXMbyvN0fIN/Umw

jTCw5/x2258xwo0oOEJTv9lYOgB1huuO37XpeImk+L
nKSEy5TdLYeHEv9aaq+LI9oyuBekJc3q6yjgiV1+oHu0Re06su0bEXaKFlkReOBmuxi3rN+Qkkh6l5OE

6mU36hJM4e+i5Yy79GM951o+jM7cWvguk6xlzzLF4a+Qkek3QDvIXG8bZAes0e17ijXCvI64BtpAutHt

BtbRVn/ht9zukhPnYUcq6Z6Oy09DswfpqccoddHvaA
AEbx6VtHmpsiPCu3tK2h6JdysfgQserepECddzlSKRAyW4cf+V/ANi/bFIt8F5tC0BJhJmNyQUdb0BDg

zl6cPOBWZeaiW6yUDfsYR9kr8WHDbBzVg9i6HG/OGDv/VDGJa/wdLrxXVztxxOs2GXkcAAsYTG3S6rHx

/m3FdlbgAg/XTdfberQP0Sb183083tewa3jscH6B7B
/nGjafjgOVCYDQZkb12pxFLeZNTgxiBbwltphpETsHnVttJyR3t22SRj+aRC6IDZjt8ClLnJXcWL92Uo

zOiPdo+CEeQ8bxxU7CGImMbJbATQ8fDlrPWH/623IN7UY6cvg0jfd0SdR+BK964nX5Uc7VTUtbNoxaP7

m68UBZ5G5m4S6xYmqgGi8uJx2hl5VfQxT9mnKyFM2Y
/yqxp7ahcu80F5QKEqpER1npi/9VrRCrRvKSN5ihSluZ5NLGlQAWYmWl+577/T3x7uxlErtBgLDV2zCd

UpVRDfwLc8CbcPR756ltmWKdFFZ2F3k/qq7y5y2QNyBVDh8odWbAM0uiVB+BGmM/HpsFzNxeFUEM8IJI

Nc9T/enb7KpKmEua/2rMR+Ho3YLyaeM4lCrTdHI+pE
qmL6h3hIXpIP/UNZqUvSMRzLJOt9AG4dLRKi2vRDi6Mrq7jgjuM65b59j0RAecElnyqiibMcprp5EmZz

Bq0vsfv3sx9W34vczhjnQXJk0kwXl0UKVg462N0082s/27/+jWQ0xja3s7/+cX1UKm8RrNceHBZemjJo

kdkB/CSHf13jbk2RYyS/6kiquEKknf+UelXR5+cYt6
93YJjCRcJYAfGlLFrbBeBzgxsZMfJrfPtzfOjmKXEkzibokHT8gxDWExs+Wz0hBQ2c5HgJaHtJ52LQ0O

NghDqWfn957Cg3b04UfFSqJKRzIliNENb5pi7Auk+sX6z8WnA7JP6S5UtAx8pnHZEQMJM5BGXOpxftNn

Ea0Kwt9m1y+NEpWK7iZyfj9RFfnuRNHdWEQCNw/GDb
QhMKf2WkTwkM5U/duShYAUo7PO067wRoDwwKylDa9Ycc7H2syPu5tqz8hokMM6q8Bg1O1irexrGhoAOo

ZPVyNZgGMh65Tvs+FffbLb8i6gwjEc+Z4lQM8ufzubc6G0ii0IH3VLYV5Usvlb+dX/zCapRJMAru7qiA

84Eti2bqkEaFA6uN+ZIbUrC8yelXgTJRhwB8Ew6Vpz
rzvQtZUMZJuQY3XYmdxxTNkqqrtW594NGne/7z7dBPzLBokRfACC0TGfWuSWr1ub1E0LdMwshOX8PWDc

X9kd8y5Scoj7O6eeku3xZ4dompZSANqn6q6ZK4kEggQn3/pBgqAgxuO4gvaTKmBkim699BRpv625/Lpp

2n5kmsxSk6ktoORDJFc3sxxodfkgu2RRlitGhAp/eR
kKF/KmFK0ssTOzWhs84CBmzYsLqk5RXidF56grVhzbaeWR+cv/9JAU2whVyHRcvVV2NNXczarfzB1gtO

5qDsdL5GZg8OIkpFkIr+3Dm98lcaQag+POxHcAxdSEMe2Yz9n9SfEie4ri+iu03OtxNu6eLyaukC6eGd

PGRQZy8O88qgJUn+1BavquOOXrzovW0F7H0WSypI4e
Hhk3lXxfw5QQ373NvEhAkZYZvmogvqMAilVzAAhRdtyWmU2A3uUexxii7jFExts5dawi53C0S3tKc65r

KydLLC2DW/bda3FLST4nTY/nVh70kT0CX/B+5sLaxqTm4kcuCFNgNLlV7CcrK0m+V1dItBDW6TuoLLhv

U10PZedxfeEohJvtMTUAfD57SSaCCOdHpkuLd9BWF4
oaRSGVVxKrZef30U8fNuiDZ76DPDpR2/EUiPK9zGNH/4EnIm81vgtQRr8J624OgP6kdZw01DYX0KWpTq

gO6CXnSNJxqnFY7ZfzvK/M+84IMV6cO+N+5BO/2TjL/1dTxTsXTUd/jYiG6vm11srbN9GwWeG4WohAM0

5/yQgg3nk85Hb00cpCjPVUZzJMpuht+7V/WnNqPCR8
tbJPO+gVfujeYHh+Y76Dym74LWAA+1aJqpRV2otdKJM4MFVhVBDnn0Icu+gxzYC8RVGretXcUiJ4PceB

b38qxd2fHPhXf3pLpCYIxNfKsHI7OIcb+kZWglPgZCJf1s+zyfvGeENAFBvhTwJwDe+O1bdqh3zEOzNJ

wVwBugdsG06O+12Om9k0u6UmnavCjNkmup7pITiaZS
bdbe414O0Z246vx27cr++XuJPeEdOSNRBY3jQZ75G7jGYKD67mI4UFQ/eS0aXVvqbHpw1WfFfnwSqoLx

fUGr9NX+HbiuYS/XdNc6KjIk2PPR/Uor0HvAuYvhZ9ruwrllb2EZhgJ4jGc5jwsI9CmgwhTVT7EObdLG

uEg2ueVtczAHg3n3Yv/UK0Q7Hsy5YkfjV2hHF6M4lt
/3w1dUiVgwmO3MZitH3ytv5lSH9K13ADqe4LNwfTu55gqZp5EBfqVPDur48BFd10UB1XQ6c0/thts678

bcV+N9Ah7q1d0jTtWGGBCtj3+Gkagw+kbvu22F5s+Z7wNSL6KVuPs9fRKJoMJzNKrd2XJP58+xucoTMf

+tP295kyzUYHoB1IP1NIvZhWAlzGYNdB1PkPbxwd1M
5MjK+RRqEMCycdv5o16L3QBQlXsOtrNaYOg2Uv/Za5/1gp0QhnJ2TAKoEd9uQweCHw/dBvwa09T+uFCX

9oI2ZsDE4gK0e1Tk+OWD2STkkH+SqjJyy8J+ECvqSVh4OVs9TskL+UIvyCziT1B7mQA77h1gYoI6HpEU

rtl1HAQim9o+i5XjTvPs36rqLsJQEa/oi/XNgiXNUg
YUa+oAQmny89+82dboagU91nExsXhflD5cUn0fi6cIOEcV9ccrFCB5b8Y5qLr22/HDGXX/tnRGW0WdeV

jkK400BkxaTaRNThC8gPTc/IWdn48CU7ymkvLgjRe1eKr2i24LpNdYv5Cz7yDUpf9LOfMGr6VKlabg2X

ouOHYWOmK9D4bQvUg9I7/KakhkUb24NRi2JcmctXZB
ErJYZinxTLRks7vTruBtn87+j4UkbSAg6qrTJ01cosRnFeUHnVE0TrxT230tN4fhH+1SHXcDcHiUmhmF

4S40ttV9ESAUb0yuj0TBweu/jHaAW8Wjk4kM1rryE5D2E0LmxVhvShwlK7PWyaHjAJStLrm1xiGqKrmY

q2wA4BBGP3jV7rXyE3ZloJOgFIuA8V/zBPhko+fKuD
WhTLzpnD5+rzSJ8FHQCyo3WzLF0i3DSFXR7nMJfH4fL+dcui2eAqmtdO4kU7JdsrwO1dJ9gSxzn8IUlc

H5JBbPwFhqVCCVQqvy2/QcUbeO6giicOYecVJhVRBXk5+AL4gWgNjzniNWSAd3uQ9jYnCic5fgQ/qY/f

KZbwhJm9vyv7vMrNM3rx8RmSisTvM1mD8bKbbirZp4
w/guh1ZVF4IhoHfw8tCDTZIe2iqyMFzl1saJQHNCDLjB0UBMed2yYDrECUOgO2FI9G8T675BETJ8/I/N

JPcAG62x37fi4xo9EKm4KIc7fWInmnXuelGvCv+/DUTEzbIiP32/6V1k0FqrS75iYTvwXwwsk56c02wi

pMd0mSIE0W1dUBcwhdnQEBxZTm48BsoD97ElaHnaNJ
uga195V9WjGPK9/aPyfhQC9hpDfK3JPImZh2bkjfxQ4wRL5YIVhvcFgh3yZFFIhb/bGhkq6LSp8WjUMU

pYWM+6eLgTx/iReEP+dViGg596X02UZ++AJx2NFFQ1GzV4nNZHYEtVcUiholuKbdfd3EzJXapCK1Bs9Y

FPWV0dJ3fijfB89MBjTtjV9jxrSxhCTAougOLEYSsA
3vemMcU/VkaX9vLp1z5mwO47jHgmVnuoNaPMPrus0cAIKy2OhUh/FHpbKAr7W4SZWEUpSv0k4myFSbGR

nTzTwWb3NgdneDZZakArOS0RXxEzJPsNPZOJ3sTRemEcKYk2LxEm269Q0DnTFa2sqA5JYx8poIP8nWpw

1Naq5bkHxnjoiXdmQ+YFgWnwt1ogU2FiXyNpg76RRR
u1232P+gKTa+irQBCT5/c5Uz3e7Uq5MbGGEjkReRrWC7grh/ohlx2lkAGYdgQksl/33/182a5eCi4eVk

IpVtVaCRyUTwESyp35IElO7dVGLv1KtjFyLsV318KY4JCjm7tom3cuq/ecc0d/r2qvr5hx1KreEs2/H+

jI1fedg8YgkvkDizwlZtuVV4WHGzibvCcIdX8JWjNT
/nxHm+N3NURyj1kIyJcLzLHTAKdC0bz+5TY432kiSgGVQ1h7meGIf1mJmEyLDobdk/wTMGB4le38k61L

81qFTVUG6cIBt28ZfhvnUqA4Nwbwl4/Q1VPKpOQsl6+3xGC13tzI7WppY1uD2RmA4MUF7zrFRXnhdUkX

mGfvaV1zgaBsCNPMgVj2OF2pntuBxIvP5mgxSP2tIF
eP3+3P7ENHSgICcqSJaUQ4R0p5Ypm+Y+S93iQpOtZZdZVaHRcXV+/dCUyFgLBxEfcRrHT1sU+MumqKe3

tDsJYWAfNvh2Z7C4m3FQrOrfWSLZ0sjggDxCYrj3dGpeniFtt4ZSXR4t8JNHew95uKQFJO/3maL+DymF

SteDe9e62KowFy11itX7S1vOt/nHK8mcNj3epS83sE
dDdTBoY8FJGxO4UVZzxDPSkK5P1rRTJgJI3xEyR9Tfp4ODZYEx0/JS4gp7ApvRRZpfa7RvKqLKZk514+

h7Vv8j2nDtpGl5+W/gxos02VCHRMOvh365lJtVepNY9v9PMfIRKKDnnKuZohC8PP0GVmQx1fgtC8DBM5

dyIhalAvUCrd4adj2O8imoLb2FYiFmzkMrd4d4gsgo
C61P6QDvhQOMX+o563XjXIAf6bHOQuvCEeyE0RtoRSkS5dL7EprBgckDdj7n65fXLd389H9er2jEeEX8

08bRbFQA/NbQuffSnnzrAtVXdr/uEOsC6A1hkb6hIUXHJoVtt1ZCc66zKeqp0nsU5PYOMkVkX9gEyyTp

Qeqw/iYGOibgGp/qZzsKBgCtZpVK4UCb6Ak+8fzYHQ
Gl9t1mXPwViTsaQqo/jwkE9S6w8IoraJG3Wu5neIMiDx7R80+kJvUroitCJp5OB5ucFfNMloWnfvz2xI

L/n81K1bWCzTdP6sR8OenrNUW/sIG/cjg2LVgYs+DiU8gOqqlV55wOhnZLnRQgiiJzv8a5Muy5zhkDEN

/Rcgx2yn+dTmf8YQbwr34Ft1LrHB8XviA3KnXcu2+h
7SafYaIjLRMX9qw1bwrnoJCRTabijz31A5s4OI0/LS0awy9IPtNjghBxqXi5jNqbjdEfssOP6RBdzCmK

tQT63x2HBhVxW5KqhOW7ey5jcRSV5JIU51nxykVoIMPp6l2LTRT0gvKNugzt2lVIoRnZPyGcdnbptr9V

cBXGsPGqqz+AHCncCvveu22Fl+72C1sGM02D0M6HsU
Sm+T9K8uEfKq4MInHGQ3RGNsPkFO0KQ7SSkQ8IFcVzg6yHi4JZoKgrKPqUiv10zZe085cotcxEWA7nCD

t4kwDH8x2H5Mrno+kM2AbKWMumLSNN7kextGaalEL/6J18QDK46XvrCfB6O6zQjJEw4y1H4gfLiPkSnO

nyM9UIMa5N9Zmi1QEC6q9kd5xB1N+Xsks6o93qY8un
NK7tUGxf0OUX9iVGC/jN4GfW2bvW9bqjBRkdFakhIVieVKvkhE9Aths8md3Nc0qboUqcUOnPFWdGZc3E

9LcLYl3JkfeC1iexqx3RSc4YInYb5xlZkekCo6gjU4daTEwgVX+PXmyvqgRAuEPF69eTNzwJIU6HZsg6

srs82B9nSaXGMkLE53VsAlaP/gUyUKhnHcp0oxtX6F
sOUiuzprrujaMm9B26AbYPhIbmp2JzsIYnaibulmFeGJ3WCrElA3F6hqwDoJD4eiaY2sjTbZx53J9H69

qVoU3iXiJakzKtGPED3jhQWfEdjeTuO9PZarlp+IvwJ1aEgrbNYMCkkpxhauNvhfzdUcX96TcVwn3B0l

a4yP/Swg0oDgDj43VlphU7nbpoar1VkISZCNy+jvog
s3i2U9q5Rmlrd7EyhsWLzCyoWmJ/Cb840so4Zuedkryev1oA6vjKArHxYlh9nPdIKQQMPADc2P/0jCm4

3owx75xPYxql8R8qx5V2aUHbYIcBfMBullqtWsJ+viVnIrxdi1GrBJZnA3eUZNu0Kfzbb5Nms1d8jVsl

99e7P5B+54JOIScs5CgzEzSvU4V30Cmh8e2wNQeOoF
msUw+Q+1OKhw2fiBthPD5HbbsSewyh5sY8Ef6H2FrropcA2QMfb4sA2xx6WMVAj0m3ha6otFeROW6HKc

gzEk5sXhStEop4yuMka+ujtdMBBHQXhjl0mzznVa4CZ/Dq53DGwPvV8+zbNPXTopdSn2vIeONYkMJyRm

T5TEejuQnGU3CScdNiu9gBP/q3j/7diLZhotsL2r6l
cGFjvT1xXNBI/P/TSrR4n4sCYV+fMgOfPh1YMZcMbW8in1JFFaOiWpa+hvTx5OW/5u30ApElny+2aJMj

E0A7fP00VCnVCF72SYVcqStX9Q9hHe3Zl3Cu7gIIDwZ9FAwjXydRoIPQ7+CcNoY+UnR/WueKGFD/GZvJ

vGFUEVtHLEatKRbW94qHOnDKAKcuoU+bIjhLjyMZww
kK+klcyhCt6+lLpBy7EwdBEWliYYlghwx/QZdWJwD1g07qM1j4p5OfyBFp8i4oEusus9WTX2DSpoEqmX

77uVFsAmdij++W2JSfswY5bavrPKJHBwsWOy9VsEDfu9XhgFtnYE2iqyEX0w7Z0nKBRsgNkKEAieywxb

f0Ewm3J4iwFLTMa2bnfORVjFA8cSmND+HyAUu4mc3j
iHG2YmlZHN00rjIKv+mLZNxu5i23F8T1PJoQV9Jd+DwAaer0FY2K3bg5CQpOytTqGwIBvOm6lLk7E2r6

/N4uBvefMSpTm+dvOJd12sea0mZPKexY43t+boTW8GgxeOA/Kp9XuMxfpL2oWzZSIYBMi2ZLTSloXT6X

+1DCkrZ0X3ksS3rl7tW3lF92j/kDS3+81P/Mn6jJqK
L71Lq1BiXIZmL6ge3HW+pn6YNrNRPIHp/dkFcEZ9as/S8g8bTUtPOQ9RJww/2B2Cqw4qmoqmvernN6+l

0eKgttQQXezggxHj1HBl/v9kLumpP6nZ8j1q9ACtq+o9L2izHq/Si5ZquAx17MrUeJv0EF7fcu+Fd+jS

v4J0JqgvK/9JhG67ug8WHynlRF9ssrwMUTxRaeBbS+
jkY5TNUH9MqZZ+Rye1usAZ2PJCmAyuiO8O8F4GT1pxxAQMO3KIY6nGcLBl1uV8BtM+6ATYXTG1zttgZ+

Un70qeP7R4VB4dFFqm/soUdoTEyvBf1bFVZKSAljWFMxXGczga0Nw7uKcEt4Q0uj7bb8c1DfWjxr/uHw

22+I80zH+1/+ZdxYAJCMWMELfuQwPM5FRWmbtJqFLt
wRqGMWg2NlgwcheW5bJVNHLMCcv/aeEAzn+1iDZd0d//AtOZaEaHNRJMviqRCt5wp1ktWCffjT88k8uY

/T+8HQnIp0rf/upa696g8hMYBIF1yKyqCjFH5zVbLgDf8wq5iDVTSV76AkQbI2SxBuRLrBCtjzCxZX5i

yzAAY3VgwvEqx0joOQ+lTXtP871/6xZPMDPEyp0kUc
qvb/awIfzxb0HFK4Ybz8qhxDH9Llj13YfwOozhUv3rEHpCTnZPLzNjt+9HwHY7+ZSz4qpz2p2M00bhXT

LEzuiq5g9edgGx+WWQr1c0U1kYWYT21C6W2Y1pPdTlHQ2wXUyPmetNeQsNIZ33iYan85xIUL6YqQ7J/1

7ZDqGl/hc0s0FOEB7VPrxoQp12Ue8EIET1rOwzebzl
hzK7TJuFeg2/gf1/vpkc4Pr6Sk0yIJfNo8RqhI1Js+ScfWGjrubzCjiF6zwWJNnSxhns6Jbb1QdqE57R

mr1Gj4Qa0fxSdoTwiyrYONFny7gvZGMGAFMpnZTygyhFn6gpA/sUhB2BLc5bJr+pYWqFiPL75kp13NG5

zr7NLBnqdAU2TkW9cY/6FLEhbRcmbYlyL4wgpwSHTG
40i7M8wHjw8wt32/RHvipr4TwDRVBXS3OSDDIZF4EEjPMpfC8WDkqMZwcSUHe42PSlMJxFlp+y6w/v08

5tQ84LeQS9hjMSVGGG61tmXaUaxBYx+L1noQW/uMWA+Qq2Q8WglynNF+Cdskj0GSH9URQDzJRy7Nu6f6

UFbVA7CMfw9W0zPDqyizuKDQucOLZ5XChcWg2rhPzw
Yhlqw0yMt+iKvaXH+UHW1x/y76ALZzm6hutMyL9uKgk22jVeqzem52GLqlYSXasDcurv7CFS5Q9/0KXe

GO1nzqVkpH+v/8qnXgoK7HxlP20CXau+By7Kxuj0M8DqSvjYCG/q/j//TNz/Hy6shQUn7CRm5PBXMnBA

kjRqKDonhZ/qQdCZ64/vvcXNy9xByY1ebJiV+co3Iy
38Nztl0NWunvT1dzmQN6uQPufYwcHNF424Aq8G20cIWPiaPify4fuwE4SC3Gop6uRA48VtvX9yVrGhHE

yAmLP2DTVDvNKt1eocuSoegFwe1XVvYgi0eWjTAm3hd1DsHSXbzc2a+W/nlxDzsIHm1CFB8e3lgRtIi/

oGMswA3DBhKxDhtW/ieXV09EsV8kdyrL6jRiYlI2Lr
N8F+cZDaH5JT5P+MxYr0zBnyW6WDOMonXgIjInKZZ0U8vLxG0+Dw69XD8QRYDWlJ4kzmdSEFGhHD72qQ

2KI2XsxfiPUDjGd4J+zze09FALZIVWQl6oUDknUHtQTscaKvfexfMdS0KYRDoL9RR8dq4NANj2VerQW0

EpTtup6VjfY9hTJYjlEdoMyfFahosRVx6gLhCvfpeQ
AZmX3rzWrA46Wq/c1PbrD2JnMDcVZ54j7DlAAe2zIQ4zGiSkWNGX6W4LeqefOjZrcGnMIhxSJi42cNA8

XWhH22LVd1szwWGnEF4PsZh8VzdzQ+RrFATrGvd58F/1pO5RJCJNqfJC2B7WJDrWgt2ToPLmR/h19j4P

A/OBlRPh5BMw1aHwg06pF4XFwhRzZtJXtLtBoDE4kt
4gode0QiuKkMuhyfgZASlEjqLsgrSAi5kh9b23iF83u8V+MGH7cGyYsL0fzdMyU4Lbah+zBzs7bcnwi+

tyy22IapjeWjmQirUAe2Z2H8kpSs/JCxOWobHZ0vkr3DYVqX4TDRfof+iOV95FgltsHWiYJd022FBW5B

Eyees1bqDuPoqOolMRhZHv3UsiqF16QqLgYTbvZwh+
xlg+DqxtUyPIJyGPGPwuM8iK5B+xLnNu5mm/n4zM51fXRV5ZRlsSq8bdZZJ3Y7Byo2f/0922MFAOsJtc

sKaWZD+vnPPNS9PuFaWgOH8bGcSEPTdJgc6CSTOQguKzUUsd5uQXR5iMTLalyVu5HSlGbu2bn9rPNRHB

nrKE03VlndvqSM7cYd2FPmGjW2pv20vPCaXMsVr2Z4
4s28xSOUzIelNZ2o+WzlPoEtnWEiEdCCZL/tdta2o+BdWZnluabNihXGiVFtWKDohwVeUcs7PThpDmLZ

PgwqBgsSvvD2g/U5LLC0H7wz4+5P/5XBQepLew/yjI4YO2mmsExD94brVFrzsZCIDr3ZfwRddtJ0FaLr

7fldLExgpy6x9kAEdC/2Eajk3jTfwWaY/jO1nc46yN
ArLvLGDIY2hUvd65vBI5C8JucFNO2IDHTXDL+w1fm4tMPF6Y+85Un3cq8+xmeHGfMQn0IppcxkfMC0+N

uB3quVeBq35fdi73FUPBwtF85t+skQqxoEs+O/Sd5Qwmbyrtk23rMafVI5l5ymnSkFoWRZpm0Ucf8BG0

1+1fLifNZlS2qB1yvJdIUARQ6UprI/xnGaxGnvuxsV
47ZkMJ862gzMxC99hhsxuS0VlVP4hvYl0tCTRplprTHa12neNQONkYLhMkfQ4bVNDGjPAPi9PRDSxRc8

gmM+ZE5k3TRRymvZlFtnP0kDxnwRbc4SAiitgy9SgkrEGYoTqOwxyNaniMz0vSvjZDhKj0Kprmli1KET

HUcxCqhlmJU7yhxuboVqCpEzhRIjQJsAob2iljOtt3
sw+oSjYzGm8SfhGJrXIX0T5fgi/yIyaaj2Eyj7067KwMPIOQNEHTnMRsw7oqGeJYNO8lKXEi3/wdVK/t

XvjoSUWztLgtpO5jGb8gESzwdb/e7xSO2RqUUNr1lBAWwS0dVwILcMfWRbOM82vTDVXfp7W9a5aE31S0

w2rJ+J8RYC67FX+D0iMAp5OhpVcObo9RXn65f2P88I
eV6G+xdX7W/OYbGmF16zLbGMI1snDVc81fhKunUiq+La1u7aJNCB9j6ezTK58PsgMaL2FBCmTJ53XUsN

LHkijci0SEuNfWALleRFqoG1/hj/8x5p/lPcJe9yFGxFqsVI/kv4i1wEwFTjsJNdpVjD4pJ5PbOyNkK7

nQpsg10Lw5hoyelHW/YHrvl2H1f8lwTJNLZBEVrWrt
E0S/RiW1MIQswrTd5rLZ35VoeFerhNb7FH+8dE4OWXsofZ9CTYxfa3O+6pqA0MJmUCYmVQnhfNJWvoWf

LWEr8L5B2YD3qO7ZZnNzc8cZ1czSZfPkN8f9wvvLioue70u4G7OyuZrkQGnWRlaxAmZTxavUt+EcGVZQ

GBd7SJhKV8PYIJInUyT7FSKNCr21RgbZxh1S47Km6o
KdRizTD0znJIi/pT5u2+QXjnAJm6i2AIc0tC3ZcuLUYD0+jflhfxXYjoH1Ui/JMnNVMq2QlZMHI3EOEf

TkUEmlqWxLu2342Y6O3GVu8cyIHbjg281+66C8GlL88s09JR/9YuDuK79tSk4KcZsxGBIYSw3WQ42lib

Ml2UwQZ0Jyq8OSG0hqxcmj6R3nTsSaZlw0Ihqo/Karen
4drc/q7zwIk8ZkEuWckwKsjttVrKVHHBPvWaLcJhfompg5oWQKw3JHePg1CbyQ9d9wZ2lYw826W/+KLP

moqt8jAaxIa14PIPHsXM/lO9SPsfC32kNabMu0aw9fCVGh6bhv+8iXYNL78SFcE6yjChALaQW2pCMHNT

esgA8+tpFD43BLEOlSOoZa1/lrJ40s5i6fibcPjJxs
E1L7pagjIBGMF26IeIz7ttc0MzW2S2ds/Dx/8mwrizdCca5eIJQA5JWFSe66GG0wKgfnziZwH0ydN5HN

XpFxYzHZrjPvCiPaKgxRG0Dcl6VAkI6NLfb/XI+PN6QrKkW4rIk9iU7RWWegRgnXZD+8MVE/vTHyWe1w

YB3PS3meHS6ib4X+ShhvJVTq5yklQSDAp+871Gzw7i
1epIto/cDtD57YUA2GIBA7okvZ+Q2yQTL7j/cpXFaBdg7nABpgwWNsIgtl8zRD+r0p6vPg+AQI5/o0HM

qpAal4rgMtqMFbOd7nJ1IjLXr6kIzrGgMzrT87TJS0jRL8EvhnVWniATVDmyr1H+lZQg3iaMNhSZqn4c

xOKXIPnv7kCOCFNLKi2s57L8ioXJy/N5gIjgWyfXpU
GFFn03XiTYxf4/KsTt8mwXpjokD2RZtIPwvHc6OAl1TF+xY84XMY5382tqh/dAA8RjctzwPenhDt74uH

0GC7mca9OXe3kITnFsHMXfuBWzNt651cgpkT7RDwmgy7Vq9sPAfyfzEsQJnkj3jUnXTUgDWOVOtHRoO4

+vg1d0wMWN4tNiCAd1Aq0fKAY2YEGqVeqQltox3gI1
e0IqKMZpSMtevQl8KpvYO5ynq3oIWz071sk15BKlmqEs7waFSnaZRYz+7uv9dyF8FK4RGjO2MWyyBZa0

Tdripj+psJ8TJWDL5BBcnKyjYf006IKViM56qYuKOHP5s9tWT15keuJgiJR880dAKTDy4TU6QFm2Vbd3

XQY8f7uClZedVZNHVEMFEwMBSGkM4amiZpS8dkmw4a
IdB5p9H+3adHQ8lShUZn+VWAejKQEWG8MgLtgMfG0SVymbBmAoNC8ReTIRJrPhAJ8bVyAf+B0LFkiUUr

BzM+IDLH/6UJbgBjP/pTcmr36arWhbl2jKWkw82WLbYgKNT6CDXwVpBfHW//gNowVmKeaDaD60a2uMJE

Tl4JES/rnAfgfwQeq6dEw52nvo/G/TemXR1UZHtzCJ
5iZ3lIfaNTq/Vop1qYfXZjzcUz9bo5fhduUzra5VRKQfxz9E2ZJZssyHYTXN/PMV/3rVJNnJjKFibtjI

a4d8WS/eTwMBA6ouHBgSLGq9ywV2VVl6rp4Zpro/OoIV1Yp8uxeI8fegBx3S8PelOPVdbuCiixSugiNJ

1Q5tfhewFCx6SQggIq2Ett1RIIoi+n8xCa2C5gJGbY
aXvuwrYYl2Vv57GZut9s43R/ppUorNnIAzYHQZli3PDcMdDJUb9Vhx/2wONHXZGt6S8O0b28TVssYJM0

oCsYg0Bm0p8WYlIt3UU0AvPgo8Exwp52yfp5e4CsAyivCDhA5F+v/f3f9lE4gC9o6bqkP911xnCTJt5d

4Q+KZ8eEs7YSRdPBpCfZ5c/+Un4dFN35GdnqHML/Ga
0gf9d+j0jn6/exi5E5ColyNi2noTR+jN10npycL+jfLf2lODqzPWmIJ0n/GSwmdE2++apAKczo9lqGxf

A82kGr/UiJmya3jIkrv2ab3IJNYgIe57lHkv2sAh0zsTTCyWLzxEL5GXYLanW3tfsEi3pUoY2T2ICMIq

WmupWabP2GgpJD/2vY/putlH6PKxfj0loFc5EGFfaX
Cc6B5IKn/miOM/bOL0FeQWs2juQBLJb5iqoqEJBnBQaK01uXek9+8GfiAOI6P14NTB1HFgta1VZfYVXX

aLhXNR24TidDuu837fZUv1j9nQXZrmWne7UzvpqS3u/CafXakxVHQpq3hutfkNsJMr2JbvcOVhiN8aE+

XCABdBPNCCDM7OuLW19JZo22lXxLrQ0HKMcd4GV9DB
DYZlD0sqwGpqH8gcAc6NMZ5mxdw42N7PuanpHu2+tGjEuB91XpxvHhelKsUyqKsz+KD9yvBCUYBLn+60

08ybX2WdvjUW+K71lfTKfzUAdoggl3aqYgdDHD2MiXkaWpQftoSD5BLCyfDIDsPQyhBh6LeCmWpMRrcb

HDQMi76Fy/wWVDmZfeFFKE7DS4fPsCMkImaRF/GD7v
7WJhTK8JNJkFiFkEM7NIY48S4xrm4ifoRqwTYDRGirns4fbiDD4s4fVuDgYzUDeVijAcbgurJ89KKm34

5DG8XhR0nHpNbPd1kpbDuDbkkIPyAYIfjqpnlj/YnPm0BXTZdmMSLm55xGzg23JuDLfVLeesoXXGvhaM

2buiR6Svl/GqEmFdrjLpRlWFUVgSEEW1yLpZoUFDv5
YqaZ7dnxqbpJGmNs4mB66l5InqDFUAMAP51FGJ0rb1Pz3tOLRBFqw1dXuGDsmO/lzHidkRxjm1OLFv1Q

dAxVzOkVFEn9GEws8BHW/+OTMxHi5CWnkDL0zhydKZ8GoM6VBgl37j7Is39tNi994gMWxH50C0JvkD3V

T54wzr+dJEym0w/rXty70ekVUIfRJA6ktO1Ihu9i/v
AzuMhnfwtbJJ538DQnp9LS9zZ9/9bf1QT4ptaot6gendo212fGo7fgP295uSwCg7s3SBqNl/EWBRnvxX

pPi/Fyn+H85wV+UaJKsNdPaTiyFH2shJfVw94zbGVIF7iOswhxzwgZbDLMouGAIEREaU9EDp8pi/gefs

Hgrtg3EW/RxzgE4TYWq+wnI1z8VOQgHjrs+NDfh/8G
7iX/7gf+MP/hU6x/kg8CmLLwLuFFoVjx/SgnrjjzHLAtMYLUO1S+qoOWoAQCdi/z2Y9hokJaVmFMau4l

+/jcZeOgAtznK2yWEnoZfHOicjx+ny4+phzhS4eUxwU9XiU4eZqdmu7hzNLyVYkacY5SQWG8i1fGbKR8

9lJI8m/Efq57VoRLtGsU+I1nnr7v/+sG5/k6A2KYiV
Txqxe5vGPrTLHxW7jYyg+v5dFVxBWM6PNjM7W6ZWCBTUrq5hQQHJ5u8Le8iRhggm8eYI2KtQsqq4DkGt

sljLpXpTjU4WJq/knUO2QInp/8LbftJAVjWARzdgMU00q08vbTaGNBRQq3GVGn4br0hfnDjnwuyF1ZSs

gEaqF1X9NYntE/5UK2TdD2NE+/DVkckKLOgpiUn947
U6odrv5X+860yv1V9usKoaMFtI5FrBpU6y7tb3qQ5UicFZ0I6/te3Hx/iSDWuQZC93AZcE4e9FVfs2lo

Lu5csid8OweWjAya8IcvHQt8KvBD8Cz+ymziY8KfekjY81N8osOtWd79Lk6gmWIvDBOAysIh0/fmZDJa

cfJyvAWtlMkiu0gn3rwy9mdzpNXMChTIKetxf9RBQX
EsYiAWZuC8v8YY3/yKaJ/eL1uHqOKI80Gd99TMAL7XBN+eyijbvow04muLJepXfq+rNnYCpg/eYfurZY

U1J2sSteCi36SX6hFEnF99/H5w8U5ap/0w7gxIL8p8zAyt1DVcLS7K1Lz1q6H2P2WCMvD0jJfZarlEjo

dUsUoZ9hRDsUArhyAishEEQBeGdqTHKT+rXLsJABeK
6sXltyC04hNWcK2o4OYSshVvxYRfIZltmm9IW9A1YsYDq5nO5WXnx+LNvK0g1Tc9JpAu195DYZeSIErB

fpA9wylCsk6CG0+VaKumwG35vnzrX3DVTpUa35zKvBBjcWZawT7Wmf3D/tSnqIe8t71shGHS970TYE/c

PQBkRQPdoX8duIxLq3hAR+NsTxD34Eu7cHspp/l59/
Ef9F/T6JOqpkO0Hhw2XOi4mcV+yhEa2qGgT0hL/EPonLazZpF9Js1msr6JhJ7cN7s4iNfngRz8dmmr4W

UCsfqc5kIycUEGYz+/ZV/fciH6by7914ko3A1iKlra+zIexw7Ig6maIH3R+S5xq/4PKjvMxdTzehUHCm

WCiMdprn98ccaa0pOjSlp9KTW/NWsm1l9Gnv7wyyOn
T77z/3uHV++DEa6RhVyeg+/DqPScZtEdGLn1W0mBdH4Da3fIeI02c+YJ9bGzDPwiewxYKdQ/OoZdNyXc

V4McmJWTWp3v/SpzcNTucNf6iajrroioLFJ4RzhzJngTVFQEBJLYIU58oP0S48jPlKTyej8pgzplTAm0

5j4uSlzEXurQEOP+rzMJl1PtV8JXoikSA029DlavxI
pfDzYwWPDfHTwtDFkYdKpOUKtuDV+SyoOf+wJ/twS3JvlyVOqUQzKO0HcO1WfOnTBoGgV2Hsbjam+pmA

5NgCgfd/TxwG1kRydToNYgCRRCxMtBwVhl4s8trt8VoxCu+zSUuPQea8g3ez/PI3AfpCdk9mpLgn46CY

u2iNbYsFsTOkc54Lr+NteGVa6TalBR9lG4M3MXlDy8
ezeSdqpPRBiernUcPW3w4i/8E6oht1MXxC2FdIMvV9dZGDdbm7h/BQritia4pIL94PDP3vyYGAA84w0j

cu/VGL0QxC5xNsv61aw3p8ZSD/EDoD3N8vRsou/Z/Hzth4xkHWXTs/4b+RQw0vgjPEx9fY2JYeOZP5NV

YkBjz+N9alseiDahkFNksHHRq0wRQpDzaZhisjQX+B
l3dcRBzvMSdfVJKZfD1ZI+vfPFbXB/1tXn25t0tFHfIndJowJbRJCEbukgCZqH+M/hfE/Ph7coJSdGaN

PmRV8jPW7EXkE1B6Ajh5k48l4Eem30t/Z1cIYg2IbFaeSpDpy6BfUwYhrq7gK8L3Hho6HPyn4mrlidoa

YWkd9KSyJSl3g4dnFeTxd08pd6U2ubm/Nc1h8UfuNk
EUN5uj8MvHk1NpE1nIf2QHPHxGuWkQW7ru4/zzsRrBNRo+ZjisCmOEcGwY/ulz8icB6t0JehdfH/Vo/f

jXZWckT1kOexMd/hUah1Qu6FzR7DjHQp2R24kiNViBHmyhoiTRYL9z6RPVJE5flkpAr9NnCI/j/hCzC7

mEktbGU3hVhSaoSeeF63PmuWR6iOMVrckAUKAdaJKf
ijeAqrYvWlbbMzzdC6wrTC34jyzb2sOveqCuTHRH6xJwnb5SCFX7J2e77ysctMAb8rljRiNh32VnOk6G

BHtGw3n9G8h56XDgNkwIZB9oBaVeAzxCyQ+RdkyJRC4ml3q5Y6yR9DR1wBsc7K8yfPv0+Lefsfe2YRxE

JXvRgNh4Udy3BD0nJMPu8PZ5a3dhB3MNN4XTFu3PKb
LMUlR1nwxNCm6q2IOswg64itcQ27z3oBVNcVSznkaTEhzTHlsF8Exg14RAR/rRkFSRkXSjlFSHn1eXvj

InUf2K5tscm143pd5koGDal9NY4cOdt1PoLafvDUJ4ozjlM2SF/UyMoqdSh+M8Zwr6yyaficD/70NzzM

IIfnqx8aVaz92s2OqxdfmCqwJ1JCpQO9j3iXgsmSkG
1KXNH2/q9ofx91Pp53DNMFVq6SP7wlRw7DX1NhxvfB4+bFcJuLSCvva2ZTf3YiVae8K7Px7kjBP4h6l2

8tbCHX/SRX8JFNq/kXfzpBDvBrE0DyAX2PGoqcN6668i4jZqqZVaLMgfl/h4nWjJ28ooJHMWdXNc63ed

FFQM8lIfCZ/maKDNeT6/uHk4dcGUXCYEavnf+v+Btn
KEFAjS6RjoqPmPt1uhmudJ6m4wgfA2raXl0oepQguNSUgZcOY1zeCe9CF6qVz9Odj24NTuKHaIByzMja

kyoCF8LqHzXbmEZMVq6dFd3Ta5JwlqBwjAtnuZ2fWpl6TxHR4F1gTAPBZ+ZPJpNwfkoIYDj8g0mJ7z+p

iZtHx45JYkfgKeg8Fj5+RIw6Ou+83nC3SyBy+WeNfN
gFBS21WSX5kZd1ydB3oMqYBLKtmwFikMM4nGFPO6UntYMBUBVDyTYFQQ+0SzE93u08sn2f7PXMp1Urlw

2PQkohecAfaKKC+rTNrvDhVetcTFS3N3M04JZtzT/ld8vVMEObOf0Rm9YqS0mRZ9vqeiyjiQgJy29SOH

Dw424ZwFM8eIynmh73PUGCiC8EeDGJLZ5FaoLMHbcr
zOK5FCaPDYjIRaDNfUQJMI4fMiTh/BJ/uDytpG1a3nfcShZkKCp6SNvFV3PxuN4f4uz5Iws323w8vHk1

yJIaxyoazAM7NC+tE2IAgiaWnXT1b9V54qTHXoe4Z9ENEkjCiMnzXLkRvl/VGqoOlKVqwsigSMGDBBjw

j7pLerrH3vhJoa4i7KTb87rgkdIseWJLw98kCIF4Xi
Y+ApR84pAIDzHnLESEcWEuZA+wkQpgOJqh/92nbOW1h1f2QvALisuRV2T02r2wbo9YSPcsomfJearU9P

z3988PL0i31RWo4IcLVXnkK9k+5WRfusRQvaXOVxEjX+pa9vYyL/lVNTHwtOYgeLcJ+8H2jzrCtQvprU

6ydpn3SV58JwlF5qWriejagkwnhDJ1hzKWAm00ekp+
xNUsA18U7CHqu67bYIWeZYD0J3wFZCHNu9QYn/dzeCVKbg3p4cuvksTjgbNYNYgCobCot0rOxFzmuTwa

6sVnOSX0yT1mgf6wr2qn/g1d3huTL+1bJfpKjsadvDxOGJlWBHl1gjxUixMajUQAj9f4wKgHshyCtlaJ

8xG/0iDc0Xs0xf9z6BzaBdAxDfxJdMSNSjIKDZM31J
vGc9Ohc2npB3LxRmzRbhZgPHur/9W7L6n2Z/dGYsvI7emtk8nATvmCxgcodYeojcLcf5j4/i6l2NJGML

9PnRd6AqIHfxvLq2YpQwcu7mZdobSubaK0FbstC8hv/sVGGu0RWcRsZDKXIS4wWIn/wmKCw10NJf+Ff1

etfHGfvv1if7ZlqgDwBByUkdiSUGfYJ6Bpxqaw6wbT
EPjYmYkiCKFliGbyfPKnDSC+hFmkRwwkIwQn6eg1ztqHAnXApZwIAyRiQflF4yPODSe0AoEm2qjZYkyA

2hPZqEq7PntbLpYB18s2qAEI5motld+PQK7qst4uVb75+is/+iN10WkGLd90XCr7Ba7BitEZ2Ds0W8Ol

fvyf65HuUkLs4Xu5S1dJihr41VqALxBiit7x5StJgi
R7a10OCVhmP/zXQulXSC2nfh0vCRJ+0hK35VD4vrh9kx+/3YmR6LUrNmcVRa3sDSlTSpRZ/okRktgHZk

EIeHe31fLmzHEq1I2WGkC01a5BlffopPmpzt9YMbyKQMN+T3lkJz4J8kWECz9mVgHMI5uXwxPEnuEDZQ

C8BJrOdIHS2xVgYFxtINOz193RiiP1290hYk/8PNsG
3sxhNDXvU+Cc8RYlGFkQ3SRxFULIcgNvGEwPsDjOJhI9muC4hU1QhNRHpe5Q7uCLNo4KAWx8QExZ8uVe

aUuJ354mMDwTRbh1z+3bvr5mjH+y5EutShtbZiPWVd10EbaGdBTZnIdc5007bn8UBiFG2xRw4uCkaobx

moZXNpIFtUPO7zPgQwRpB9PiTc0HxlwGtKbNN60Y54
fjYo7anJA7h4ZIqOVR9rudyZZQsOlfg7zQvKrtlEFLGFTjSReitGU+8Ot+5egUtRnnfFB0IYJqhHKXyM

DlCem2dyS7mO6ndZM/Fmw+k1H5HqlL4RqAKc5TCveXGTvILSnOcGiysk6ejckXtl398VN5SSLlC/HZ1K

IorDmxbcf81uvO9sFBAaU8J1DhNU9qyX4f/EPdX1Md
pL4SRlUGCk742DbVgywepekl9ADdpraovBpjCskECeC9Oc+k9rY0iZc+J2xc5IKDf/eJqA41w17qepj0

T5X6117bBQHuqldVmtpbjPBUN2AyqRsFpiDFfKVC2daH4LYpmK7OjlpbBUfobw+JpUgYvibvbFUfBLej

uzaEXf+X1XX4Ggio10ooyzivFSgG96P2iHnhn70pQh
M8sLPEI5Z24W6drPGc6lGno69+x6wUU+cUZfpVGZ1J6pJcGNZ5+imfPCVRcMvI1RRZg1RVrkkhfEUlv7

R0G3Tcb7qen5nw1g41WGYSanBEg8wETc8bQ+2V2w+cd5/j4ybdM7FgjyO4f9r+X84eqdTaA3TL6tDtvl

KD+eK1JY+8z/Lio49z75LcntDD8NCWc9fHdFlHgx3v
NajDzrJW8mphseaCxO7et6Aqnz/M9BRBn8rI6a2DLuVjAsaNgbcJlMgjP7Cqq0YQzH7L+ED1rQUhmJoq

OSqOLKkVLmxwfRG8g/s5UKOL61f8iKJXWLYB77roJuBZPv2fIY1A7xc+OQKPgSpWZELebeCRzTkTeqHa

ukhRgs3zoE1+ULRzGXOtaQZ8njuoZc+Xq+gIWPsEQJ
5z5QXReRKJ1XWMTPvBx8IY00C1f7nOKMel0q3JS4lcoovfzcfMcZHDLStTOu2m02XK9vmU8XLbpIRr7O

t7jYGt8LxEm+RzDOgdipBQseCOEKL+oyQNFpzmKYinFobe2VYXP1kndzpFEXa5UbibFaPZYK21VTxcdn

B5GYAw8IiyUCSM2mcmCmGkHZ0rXJna2hW28Zx/eu6i
7v3YE3oeAox0wEGlSlzYIz/XSCESjeD90FaOA7ZkEQChD3DJRBuvvsDPP/zh5+hLyN14zJJTEhCvCpZj

T3Kyrb2DtYF2TS/cq9bFI3lAeqUyetdb4a4DSVPmFFYSBaLV2Spett1u8RyvtOKR5TIBK7jLQpOt4W57

T4U04aKtW1U+mWkWOl5YEmEtdqChji0xE8sz32bTc1
c4zaeNqGlfVhembdJd56RllKWIrTvyMfxo3pr4wBB7WXmBTMBD91sfIY1+22AwG5hsjsFNgquerCFIMe

bV/Wlxz3DpSdC6B9Piy01SVoXVt3e12Dvxwa9ch9FyWmRtqmZn2y/4s4BUJoCz16et+wVfcTwKb+TuFh

OSF/0opiLUkeGW9HVGoa9RzrkZVXx+z/7wvP8WMLQh
5hEfXNRbSnWlnBjY/hdq9j0ggKWLkiQKN9Df2HgsmKVe4L3BVpKr/vHHnzirA1pbt7n+zkj0ICsXGKaL

mqWTK6sTdsrhg0fCZ9BKn0S/yPP5Y/Ju8r8QftoSRcmSgd+u9dGAqbzUzVvxydXJui+rLk+kluyqMnAF

wD+0GIn4GqU5MRgnZ3fQd2dSAu3Vf/iyLKz4m3GF7Z
lOqr4fWtR31YDzWWBNuPqLwEXh5O4+YvQtoyO8YFBixMx1k/lKvUDjHkol3AUEA+yczCIrNoPRC0Y5Wl

J8aVBpjeG12C5/6OVv5CcjJyqTiWJfg/TGMa2rakAhre6oyl+GXNH206jLsblPaQ/fT7S1yOUOHr+p9c

xEzC5kqKHBHiYT3jpiglF1L0W68CQfXAimSls8ETMv
LdkVw5hzEocU5KPLq53NLu4v3ND6Cga1PFKjujIbI3p/3SQScOpPNGeO6FnGBB09oRyyUoztf9+jMKZR

QfPO+KtoYVMTKBfvD8O/aE7ep1/8WrBi08uPagt3e8xk9klt7eLuKFJi43ohb+YxX6Z5+pvAg/vSX0di

Nb0CHWXsYaJzws6jvedZc7PGsTOLa3QPm8NehxBQDM
dq/azgz4vcbdBmeNaa3c0qCKcPIDZ89n4eqGkDRqcucXME3hjhH8iWDDPFYdHo/R5hoOjgI5Ov6EI6/H

rB2p39SqgK3Hvv6Hf/Pgi1zKw3nUm8MoEXziTwtTRSc18h15SwxGIWF33UF9zIujsZBXXW7MgvG1iRdi

gFYTe8KwoGfa0BnCBxUpIa29VghIaYjJjE+BYw8qt1
8yy8QnI13dTdWmH11xXqyuOMh0f5HxDMy8XA/MoWn4edQRd7rVDm/8RzBYlp/O94vGT0Y+xrq+QoBZUj

EuXZXzJlfVFV6cbLWwNEzg3qNWWlBCdXh898ItvYVDy5rMNTgt6njp/aZ89PMqKGlH0SCuvyY244M24D

KWu1IsFAGQlBrHqIdyW9Lsd0IahAAZPXuNSt1HUYet
U2fW1MSSkyQ1ckGE2dz+joZCZUyMbk8rXcAoGS6BBN+NIRdXvxzo2WXwb+qcw2ySLVVgLw8EhjFWXk4F

8o/V++BFufey/dKKWFaOSE2+CU28kfHC2Oe1sOmsvx1Jmk8is5LP4dN/gSME/XC3Xo/OlbSzNQv9a2VP

IcicpkX5YusLj75ZlQ/SF4uuMMT1VuVsA8Yyzf688m
dO2WhU/IE7j/LGKIfseVv3Y67847ZbTIVVuqBl71bUIa8QHGoY3H7HoluihbHG28tziaMjE2NnI/Nzr7

Wfc0h22Qx8coZgorldK9chwUHWyaG1cvraxH3cicDH2C0AJ+yYWsCemCdltyu1j+51v9lJPg/zUCA3lC

ELnExqMEVPGunfbKZ1J87Zslq86CRgwz5wkK9rRPaV
6wf9tVjxPqn7U/POHuqPPPqCUPYUBj7MXC3tZhjYf6urFc6eJnF+BHWwaRxj2GSmAyEdHops/WjaNHVE

R2omfxXzpuShbv9W7gIxNA9qQKWKGgwuwBcdL94SV/ZRt71mC+oGKAi1tjJj31UY6eOW4MSLePNGon6z

uomyiZ9Og88Qt8+72yFzHCWjqe2xcgQ9O4JkZuOuDc
1SGD76RuGLLq2F3498Ub+2pnqD2nPqe6ulnko/aSZdpyghJX5P96x048heUTq6K2sbqwHpEgfuTLZ0zA

tppzm+uzppfV6hYgz+wZtSh01tmqFRssEJHoS1lJ+CAwuOQiyWJLYSvMJZIXv0W+6SAz3Z2+RSp9oG67

5OtjBMSCWML1PYCkMDRfw7Hc2RdNWDTv7Li4urLSXl
A/yxlpPNG6SSpaTXsos56JGkIPgQK5wN3wdWufNN+zZZvWgeGUv51aMAQpJgnA/b0TUrTf5c6BeHIoGz

hd/eUP9vicgNaV43CilHu8g/Et9zxcEwnbA+38v+cC4+qRmh7z5Ax1SLO1uHwsraDME6raMejiO6Vg5V

jTxbpaB9MySxrgBuDFifWjTZZi66d+mAy0TKu/L4Xs
AkJtf8v/61UZB79RRUcO++vx76nP+hQwDnxsw0Ka1eJlwo1r7tKmlMTHw77iTXZq13bJEAR5hh2m/qnx

nkULKgI/wwi3yVPiM5woh9WR5De5krBLd1A/7USyq8cWnRii054+ZSlE8q49mwkEJ7AdveTg+jK/S7wV

wDKNzEn9vvR/bprMBH+yN4+A4iiL4kD+8q4+QGrgxy
2BYrNLG1icKT2m4PdIc1R0C0t9z6pFbNifTghdvIYeoVQx6UWqVJskAO7TcvCTWQd2A6pm1p4IlsxtB5

Ejc1w4fLvzxsD6ChRg1ezeQN4rBrUGX87LI+R+5Kvcq6t9biLlVgQa4njtscy5jF79oUdgmimlWcFEfn

dIRkbfExc7sT/Bzx6bA9Zn1DdETObq2Dl88SL/uPwU
PwSvDxw1oAPS28xUqnDTUU7NYMxTYjWUIXmMwwHlUcc/FT63d4rzKGfz59smlo9zuDN/fAP62yYyV5a5

9mS5mFshsSbsVy/HjVm6QICj0+KibX9oQbDg7wYj1wrWkBxxz9Tpp1Pnbpf4Pwmg3hZKkX7aY7yVQnty

q4ZNx0Lww57XNxqrSrUG6Jl0rM/VHYLGwZPELGNXiW
FB1DGvQOu6cedTJi1q+9mgP+TMXrxi6+33gosvsJf5XZOpGWLCPR9gafSywKaqQ7lURSv9sR/enOjdz8

RetwX/vbiXrPE+f9JB3wZpnQ4+kZLKeiNdAezwJBAbOIIxB5MN5yN/INKDqvxVmvTiwN5uiwSBdrUEfw

leKmWuJS0vwzD8jFSiJucAWL4jWS2u7j/cKqM2y46X
9b31rxaGPrWitfy5kEF0vrzixqNCtiisCqiYq0j28X1XrKBryVMu0wurfkmMwATwArb1+VQIOOSJW4ZG

a1QtdeZ3Tty0leR3T9qjCMSbX649uh+sbyNKLE8GuM6j5Jng8bXWTqwnOfO6evPJWmYL9gcC4VxvJSpa

IKsHks0y4vfUr5KfdIVUdiT/xY+Or1s1W6ymmutIEz
9AtzkPN5fUOyhBJ9c5/S0CLEEQCZ164Ymhhxk22qmT1qrDt+OQYTyNtPDhEr90s66jUf9RZrfD3HMYlE

Gkc48tgoKrJfxy0H9N93cL25SCFkkm6XnQsoislv3vJmzqZVN5YEQrwO17dybIG+SgHPsKNHnpVWQseV

dOrZ2w8ZgxF/T8vlbIxnxRR53jCYnpK2Zx+LkxuENm
pFM4Abumnu+/QhbHiXNoLkM5nt/DS/BoMZIccn7dbxLZoxt7XbhmkHhiaMNQ+Ws6YmpobuLnKQb9asyD

ZQIc5y/O9pfvXrvbBsq5Nsrfxj2d2+ki0T6pDEJxa2Cq6VwPxgYR+C6mS2uC7QDcmeZig0MtPdf3nHCC

CQnx6fUWXgiHDHXKxyPeB0n7yQe8vq240B3Yf2zcEp
d/n53B5RYxUqRmW5/BIpd0+FDMWhP9zLV3cvHo6aW4YRP3v+0Ml6xUG5L1uLtSArP4jcDk7HT993Z/bX

1McP5zscHiCfV4czeoVf9ZAQtCX7p1YuCjnKGnPcmHkWF6jM17VcCHlHGVCstq2UF8t1XwyPm2yhnWjI

P4at8YOGHCRw3hRj21ah6fQ1XOjcox0Xs+/TghI+mm
kqFXZLBT6RTTUEFpi9muMKHzkPabaCTDizjQpR52oilDM8Ifep5v4nX5UH0pgS7S0HcVUik1VPzf0qHI

t01fcuDUsjSJN2KoyQqiLKE11vsE37aCLoMW/uDswctRTSQ47rZP+3S3O2Qj7F5LOFqZYqEu+vj+N6dg

PjFaAi7sxbvf265huDYzKAQb6Gy8D3viIZATwljJ+3
4Znf9/ANNE MARIE+Ak+BMFqNGvoGnO2D0je91CudnfQ154n2PAvNQvH/urfD/z91/XXy+1OemEhLsIlrUpDkvU

X7CU2+KwvLsv2aOCPOSgbp3bRjNXynozXHVgXFm1KqAQfHRBWQCT0QAjuPg20GLfIVK8HZkYVkeKDn/O

uqOrluFiZhHUzl98X6c3b3bp0hgcgTQu11qHB2ajQ2
ULiG+orslgEHrcr6WQBxnh0sXDf2WrVfeh+m1NqVVoDyIWNV746/JJ5tBhk4PyCmTLmDT+2ZrqFM5h2n

R44yNv2mCp0EAkA3gkeUcmqohFczBrBRp1C+dwtboxm09nR9EKaVT4XgU6ZHYK07omWx08TDbSm//mjp

tmzoiIsB/y7GUYBuLytv4lI8NxTqZ53vSYbr5w7JSH
DwXzhnLXryn83v6faWQBEu9KU4vgVan4qbo/ZR++gT9cvapeqnbvPwkX+Nd3a6rWBN/cT1aFWLn7VKRd

1SH3vl6pUV+rPfziQbBvcWXzEe+ky4L6Ub2Q74Fdu10xNo6VhBJ9s/xFAarN9BR1/8nUq4yXKQMBqgaX

3fA6Iprq0CLmP+UBuqUKbi+eYyL8jtdGRJkBk11gnE
jngW5q1cDHN+TIf2waUpN3DhNOrUX7Ef9uobFsHuD7mMFBdDhiw7I9fpAjr28rfTT4mbczd5vqbpi4lG

sE8ZTjS2k8c7uoIoY/gnwfcrD6bPr8147vh+sl3PZyzP/+YA8vYf9dfWY4X5tDOy8TNW5XpACWIIVGSX

H8gRVveNd3k+y4/ZqU/yAjNorKtRoKOXnx//zZ6JLE
ICRWRSYtjoU5/jMmAqDfB6B9wBnfxO8WhbfTFbqs3vjBLc4/afTUJaZ2r9oLZO20kSU9rxL6iYM5MwSj

OPXYGzrJKGcvbzuMRIME/NDUpfp5zlLdWAnpKFBEqnJQsxIJ4sXN0moQ7p9vTND/TrO07uaA9jQmWld7

dlAypZABx3jj4bVUt2MhZ3YsEhDr8TQu4GM3spKh7l
ihXrQWnRsKK5Q9L6SivZASdnFc2Cjjqz88SSqDSiqE+UQQ1Ckcdyue9FEa+KroI6o073fVx2Qo6vOtge

gDV+ivzFvOzSYR/zmo41HnP1Zy1j2rmFeNxhER0offfAddFlwQ8Ts9VIThQA7Qh+u8quu1zHvrsnlZu2

ja37VL34JTZqZQ55sfYjTpvA5iT4Lj6rcx8mVnI5AN
6S/DpwPLBSAZdDBdhzGL6YHj1IOupwrdu1LTu6HNYZwVMQmm35/uaFRYNrr8D7ULhxwNyCTDYl3RRDUP

XpnjX+acKyKYmBAjV/Tqg0PDjl5rClfM15l2cH94ZLmHMNMPkhdVWRBskU//dRjoHgG5PaqkjpWCju5I

aOw1K6gDZGFLa8pPK+pNfrWqzdVEWtuaTLrUCYd0YW
Aspw1OF7symix/ct3Ea+WGc0vXZTU0BzJv3mKmtXd5fXwU0Y8M3dUcdAZHd6cPi1TDVJdBUDjJQFYV5w

4WIu7h7Ls/OFhLTbVYjGATiaA/pjIMmrgj1SEjrdZxsl5XOHiEHFwHe2m1JB899TtrQVrJsp2ZNQZoP1

JE1kcpGnFv3T3SQ1vzdBNY3xh699Y3JLuu0uwNMR9t
SCd16WsxdpFI9rISWU1zBPB3T/WAnyRvztaAkMtwesSxintGz/HbEPwAg8huGdenmwLuIjPu/eBSeJBS

wtYQgdjcMDBpaHJ3gQDfIfgNnFLsnC1mjf4XnhmdodfZ9xHsd0+uUuo9mRPD4F2h7cxpuqdp2UcIY30z

FMi5DeZuZJCuZTXbRR7pO7e7OY59ANVB0l+3hSJ0lV
FD1bbJlGa/HdtsOdBkn4y2GQrky49Iq3M9EtZUozaZawoKMS7oyNtu+AO/upaloPi1rA2euywWVWYvoo

CmQ7gF3DqEmvNpW/Q5MO2qlKLU3FbDxDUw00eF4Gu2cVibZvWw54T8zPLe8ek0+ZcnIy6/Y9vzfG8q1C

tvLllTE9KxAWGVYb4UkH/g4oimfGfeC0coqMSR2++6
0k0GQF6oUiEr8nYcAMZF0DWddMe9yVf+TFkz1wT1ExOi/XXeJ20R2a73MFa7KiV6gn+MjzfaW/OF71iu

Z1lzkUvPwIc8wFMPiIkee2b79r5POraHf3+WkZhD1y7nzLpSmYgF5a7pklvWLaXwTpc5ZvQ+ky4inAtn

TeRI3hBTtA7siNId6JN6YNB7GJ/UeUK+RHlgxJFGrm
M3TyaslxNS3GYwSyRTFc4lcC0ySA60l6n07+pwi0Rd2Tmk2IFfxLJHHzCAn6vwZn20WVppsJCxCcLNHE

cHK5jHFJpgiLEhP6CIYf4qv7IIZFRb77abwgZWZL+z+eAU7OVx7rYYri/4kav+ChTNLqUYumX5n95XOW

4Ep71JledyZ1i7uTa1Os058rmsZntkK1Nmhkzn9PbQ
cQrkWblIK9fprXol6fCQe8rovfJlrlD514Mli3g9N5TAKOLQP9y1pt2CqrIfaX+vEwcYec16uYa6ymz8

HFP+WMsLviti6A5rC1oAwi5GsRQ14nOjkYOeu8iydsKjsbxynS1TTH160UpEH73UHwayvvpvuFgjM5VT

yU8m35sJqk3DbiMUt1XS9m52wFcCS5WOGAvCmcZXmO
TLl96dL4hgK5bNR/SnX83egmSe89IDFMlDMP5DZyJf0jjLHr8v6trt98+V3criGW8VJEBr/ONfPD1YGo

vJl/YkUD5nvoyvb3kyRx7uQR8vx+wIPwE+ldHpyKji1C5K894ZAgIOmLjkYbYMsyLnSMb0eRolrCA1KV

x2XaExuRNSLv+yjWTpBBv+r/bt65Hq+P8D+EGRXVLm
KFnkcUp3ZfkaBQdKw9X7PelrfOnURwUQXOmqZm/R7hyGOnFybBNX9xR++h5Kp6gp2xa7bOdzx/fz9bp5

n8Qqr+vaxrCjXhg0Mf+i+8YzN+wN+kk3jx47tcan5pw/MqDrzafBcxojj2I8PoxAnJYAsNDp4ltgjrov

dNT6Y8WC9SXuCy2vYGhnSKwQHGAvKSSdBKBNCgC1il
FxdLV9+TiF/FTZai047EGQlEix83qevwR1PpTBI1FpWvJ6zEz6xVlIHnWKXiNfmRcW5oDn6+mjioWhm3

zDk9gsBxOkCl1PuMND/qTRjHY6YLRkX4ZxyH3Ad0o2dsHrbwY48QmCgO4IexIhg1sThrS+H3uCUU4a3D

SJg+o6iFCZd9t4CbyvBYeW8YpjWzZprpxSMYOhMqZ7
a7DGvk7oeqIsSgrBXYaF9Z3i/CgcmFE/America+Bt1LwnxNQpjgqI33YK2/sDy+/f0XrgikrNN/+I4Zjhw

EcrMZecl1eRG1TLZLPQJJGPYvtsc/mWbf9jPXqYd5UlZ1VFQtGKH5zAzqsP70ijw7v2e36RELdZUa1sK

bqhUStH84tx05xUfWGQpxUttVhzI2BHgqNrY+vXxV7
d0LmjNsREGplWdU7Gh0En/S9jkuKBnmJWOKFZCF3KpLjyfGd8/UpKg/1mA0s5ja3eA7brcTjY9OURf/D

77uuQfRXZquL/pY58Baz/pgODTXyk8TsrKbdklHgodcrk6aQf5ukI59VpzKsb3O4SnFr1u+0X5Dx1oz4

yQ9WD2sv6cUTqV4RCxb47jvXoUWws8zPo660ZgDRgT
GO8A30KhT5Q+9HujmZ0/4rRan6wSJ2o0ktAyhHdg38xxNRu0eKXKof84Kj7KjpwldAUsnWcpUYYL0GIZ

ocKy5KbhjSqdY/vMJIn2k7jLeJt8zMFxlleuxJjycvxliZvo3otIHhQ17O7EWcoB3ncD38geJUkZ4D2/

fq98oA7DMGJDHDBWDQCQGMCDEOHXYKUXLEGROAAVBL
AAAAAAAAAAAAAAAAAAAAAAAAAAAAAAAAAAAAAAAAAAAAAAAAAAAAAAAAAAAID/SjaGQ8LNd613P+ymWT

EYgRuFrPNRmoT5AY4qT4l054Wn+ZSD2j5Hf9fu1/olwiuPCX7S4n7vmstj6Pr0WXOWPpnrdiXKGH/9mt

0G9Zt647seIriOdpGi8GYXwTS/1KxzRKvUOWqCTFrF
pu3sD/A++axjlUBapOQ1lFVNFBPkR42QOG9dOcAeO7bppFt5o1mceVOIsc6RtcCJEOWZT6SSetuKHYXQ

S3QwANx0hjY7J+K2iGAg93p9MbpgrYQaN968QUHZEn815Ab9DzllqVtM0LY4XGzDIqK/HclrtFRTs9/G

VVsc78xg2tvBsM2/Sg2eZBTlceUa5N+s38wZ6T5/Xy
vyu/A1bMRId744ns0M5WM5tr5S9rn3j1+bVzfxY4wCJPtpYXvauxfPhI0cI59EBBIjNUMbZh3PW/u2m7

GakrCtf3TzjDeYcl5y/in1+4UdljArscsj55dIxjX746iTnnBUlDpjcYCE2k0RTEaMQEbI5lEJ5t1C8X

M1C22LyxNBisX+wBPGvALubUECpffk66CqkAD4FPaS
cjesz0A03AjJbpmwU74oqxs5djPXjG5f9QUqLiCdNYf2L1GQAEvAA8vD72EbFTVPdr/nNUa/5WWiQCZS

SJ9UB87mu55tgXD6gtueSnbKq/p5n29Iy+o7GO+paVqRLg8cy/vxu4tP9TcBbYsm3F976ABD5Ct/TfWs

oIH54cSQQeCSt2Lr4S67JDY8TPBMCaeM0MSKiGRAk9
YXSckZzo9ZWPDEw+FS46nYN3Jpi2tXpfRwFovI3ECdiU7DINlCxiczoaVzGsOXjNjMr2XgL26Amq1JpT

emTya9EUMeRYdfPy5slp1Ohwa5F7R85tfoiGqi7ISvTdDQ4ojgUzL9Lu2bkUr/ojaaF3PqdS9yiIctHn

LWalAjQKh9vEMk2/AgbnwefSq2M3a8zBXPZm7dH+xY
CaNtc1W8pnXhZOR6A4uinoNtxiOs8/xUunhUnq+OlCb026P99X3FzvS4za+ODYU0Sc5PTreqiT2KzKVa

D4y+F5AgpAAYHBe1up4gdIiEQhpkfnrIfD9Q1Jk1AQNJ5gbZ5RKufpu0qJFe/Lvx4LoLG8J7UcoglqDR

taKu92j5OBajkLc8AA8z0HQJ5UUSsZW7xRBL6u+W1o
8oDEYvuQzcKjQR35xqUlfXNRomeT0ZRphphw9xG++8Wb/jdp2iJNjvoFK0o714Ph9guDxvNzpnZr9Fen

yOMmfRo8WPBKlSBKmTbTknamk115zgS2Vrd5wVLWmXNhGi8Z/n7LSuQj0d2o1Bbw48SIX82hMytsIPX/

VMerG077v/c2q6nlLMQK6tRQV/BYyvjTR2H5hp3RX0
68EgR7whyF7LYHjX4+KhEcgBOUeWVnWUJLYOr9nbmqOYgqSq0ocSRNeZYjETW8/69lp36DSJ7Cv1xOaa

9lhVC+RK3POWHEmBGkK61U4XDUbIwg+kS7+bX5TVT8H5+HPw7D9mgB4o6nFB/lleCRrxX5IBI5bh/Nj6

CL1AhGBobOgUnoO/1vgxjLHdJiCaTaID1K0MSpp+c1
+euIp+473K280+rxubp9obgyYwnjon6SLTjZ+9rELYnpWto37ezS5C0rApCQB/clgx3v6E/5VyFFJQaC

MScedVwjzLm+sKQd5bspyzETHcM9fDyR3ackc5HzXYMkxS6zzyieNqEIvRvUufVj5OWGR4L+JLd9tBuV

YaIJu4rCfc6QY4sO7nK2BLDsn5ztva3LN0v8uHZiYL
TFqXBfFt/YakfEo6Wu96BXpHTL4xJjzer+WEgPKyLD4J/Px7aDP6/lEdT15sCBVVjO9VPQGPj/Sh3KYT

OriJD+bHgPYm7ECgdE5VrLJUja2c61xNLA4EXak72BGkxsb26o43I0Y2fRTP/QN0rGQSbhde4t7qBsws

2nr41w7O9j1PHE4Fii+c5HFe0Yn//n8zjnTUm1Dpwa
/w78bBhGne6dR2/varzWU8Wtykxmk7Va/ujYKHJBwOIE1wkHZTLaMFHyIGoBt5120AHer+b6L7B8nS+S

ehZcA0qvRG0pKJ2H9xvfn4mV0C6FN8mBBgzsG+7BuC2osxWISZENNrU1HCnNfLsvoKaBfu1wlnP/dBEO

IYXrY7DeJAO4/U/ZpU+Q1w2KX9WECUqHkOuSeFEmej
jiChrzcuUWIzzpD0u8uBk0O8VnLSIrWRRgUdBPif1mLgfqSnczHuG5w8tMTROQ8AziqiU4Hgy8+Q8DUu

8ON67U1N1T5+XMxMUMbUsnyhsYzlIdfwcP/mK1gjUkJTK7L9WQb6mQ55ilCupxDB2Lv45M7YzzGJKesA

KJQtsXlX5EZhi5s8vJbtHckukQcHkLWKSwQ9KL+xBc
SqWbU0iIFpMzHEeB/9h5K4zARR8UCJ2zknrv0LAhXeTQWke/3sY7sGA0el4imNSnjHT2fuIolYHiHVvw

0rwQ55+T1jnLvtD7s3FqQIvU+k1iyJkxMuGJ60PntB/akc/MODJI1HhDUFkYq1eU3faqGFsM0T1T7XV7

F0EBZ+nitoJpRCRj4GkIySMt0IgW9X3kR53dU23PAw
mhjYcCwxWWXCq2yyeXM7DbBaSH9cMWO403+LCLlNRp6zC+LKrx4bqcXJtmLxrNl8EZ7NeZxvxUNKe4jA

L0iY8lnXf5uz7WQnc03YKzv0eYdyaV7cl67AdZdumLCy5lGiuhGiXgGnClCbKzmrtCfGjOByp2UdONEs

ZarLaVKT+mkI7zZXzLWFj109m+r8XiE2+cmBDc2iyD
210tQSLfRG/Z49ZZalvfmqA0Fcfu5Em6IeA+lmCd9R8HkmTBsP/d9yhpdMdnyU2TUwdM4avi4amiMQTr

elTlDBmBnrKYMWpJNwSuijQcGrJ+LvaS8iV9x7Wd5nCXTVKfIABDBvAsQ5xeCsrZUe506STpdqjI8l4O

ZOG0mUBAq/TmVnrjqq7vxDtv5O5sQN48GnfWjZawL+
x+OAr8rLmnQ32Nk37/igeWVeX9smC0qjTdmk9IIRIIq4cgzH9Ah2huv/c51wJxtf7KuVFdjymLxFZnTk

tdg21d0+xYFFJ3E+eUrqo7kUasxWWITFyFXV/qJ+ajreF9nc83PkfXv0enNIIuq2/Zus/DTdSy5wJRes

Aequm6Tv76WTd7/d9cDNHXgazsffj71h6HAJV5puSw
Y5hdAoMH3gh0UmC0TEOownqTUa7k6Amlc0A3P5AtxZay4T/QDHPe68l/C8YWBWRrmyhDTKyfIpC680Tu

0cXmcI21TlwkITudZt8LXSdb8LUsskljf4+gxGfVoph3sKSUbgMDhaW20J4q73mfTTI9ztDJGFuu07o7

aAehVQVpqDt3Me41bAa1jqtTyOnqyQ/FPiclmVUh+p
et/4Ak7wXBHm7d2153WiuMvo0WjB7LgZQufwItKq3obai0nRoUw5r5XLEUx1U2U4U5/L0fyI9HMqFxM4

r29llE0x9r/ELm3d1oITNYDb9TGIVXv0rYUoMknSxIQ2VqzK1CF+tgayq0vEeHCMsWb/uQKYcT96YomZ

IZKseihyT/7JyQhIQPSe793fWC7Rjzl5TlxuL+/Ovd
KYZnHu2LEWQm5oAGlK2wFHv7PQpAgJmroEMwsvsfOXwXv+vpP+Jr1q4P+V13ca560o9bMqhEOIvufX1w

XI337oNteRy0zhfp/Lc9Diuv/7o4m5Wlbe/Jj6P7xc935XWjbc5m0BsfaAAddoZ5VJiU5VSsydfZMpzZ

zg/MSx1USeNo2UxXe7xjILrHeu8t1RPtYota3oi/1P
lAsDyvoPRrL02g68QMGrbA08eLZpb00DzE07Fn9myi0p3dp3jtKxMM8cG5XwSoH6CqU0QuP4Bl6TEz+X

OPyBKroQUiNLxfqrQudpq0GihMVpwrt8wmosI/gLxCE8zw0aYvrDU4JhyLFW/v3etzAtymon8tXlK+l7

tuSn0Mdv5FxYxHRWE6/tuPDuA1+fIlg7e+S+SXO6Jq
+wm10bJ6d22MNqHxmXXDFtfnvoXiWPyyuDVdyn1SgXbu1sz9fElP051HgS4PfAZ5ac3E4qPJepG599Fa

FKrX6ZoCjtR7/85S5CGqFyAs15wVbFXNYE9I78m7QqOX8bh6EGqwNd+KlrLpdai7SEOn/pgH5tPzWACj

h04b45Vl8aDISCwav+ByLg4Yy7FZxsVfSNEKs7eKAD
8oAF15SU3lIJ5qkpkHVFev6BXNweDIXioTh/IboPNbCp9Bmr7WTty/RvnRzwN5/cGB2/bR611Wi2eLpr

IB4jHIuSIhPl5PgpVTx52REU6NiH4wFochTI3lJwWPBIm8hQZHF6ZCdeHe0T6xVojS3hbW/xCIx+As84

usRBgPNOkV5saDqh8eCiiyUuDW+n9KUgwARDt8A+cz
oyp/fnxaOPBhyaaje5BevhEe0AixRz7UsTOhmry4Z+Sg8/rA5j1kaVZIVUjWHpers7Z9SQ+lDelvvAGB

I5xBZ0YWMllYce0zs8HqJGuQy2sVdGPIT5iLDJgC/pk4pEHBxLxUbFIs7gyu4H+lwo1zYIzDpe+BKkzS

yqcFyHO4zAeK8VgVaVdXfJZlTLU3VsgMhyLTz0QnOp
OqxOmTypJ7C5ACXE2/rfoLQK9NvEB5cyUSKOMir6ZmWEIIvQ8Gu4aBh0AbuuwhyZHWbkGCA5IuDUJffR

fSU2rF56mghfX9LQmVPjZeCw+/v92FR1z1Sx0iw53KoMvR+N+LybQYN26KjNfoL0ogwU/mBDYDisCZWN

RUKGnABdDuakgIcJCAEJs8ZSiAtbNSIYa8WdeAyRBv
PhLgRYPIdAF5Rc90U7xkW30p5qI4byDeKojreNk2u8OJum28Is+EQEUqemUirDtMb1MCTEEE8+kmfnKR

tF3B3AQEJwsoNjjZm0vfRI0hzLghCjputEicjoLUrHglQef+SYC2q/lbavIWjXyM3oYLY+3vr3lhgH+8

7FtdRBk1w4kr4kOGMAWgmK+pi2fY46ShNTGN3YGyjQ
F1SqOJsN3ZDpq/1zL5mAdwAmC5V1OeG67cOyugC/AbN97vJ0ba4+ZiMAKg8dxc9ccTnACD0mtNNt8Aj6

QNJ0VZAt7BxzAEpf99EIFvRvndhII6ffscBZUhc2y5DQXUQ+rY8PjKvESWBbhBQ/Vl83vfTAsB2VDUhM

BnRHnGRS5let/u0+lYsUUNsHuvcibeNPU8exFcV4rW
zixR1gd4AiXEqs4eljmbrjIlMwb5K9A67Sj2IdBr8t94jQmdo2G4hBJOneWWKcOyfH4oezOOjSGNnSI7

IIzJ6YwACT4oJFHuNBNaVczowjRQ3d+3opFjNYo6zZxYvgFmUgemeafeV3OeyVwphtPmrSlVHJYx7Fr1

a92F6iX4y1pCW8aOqk6hu7hGCDd54XysiFsBnREgk3
I6wbjwHzdhQzr+UTYZYlsyqh6id1B2zXjztbssExmFWkeEiTkoaqFqSsewJipfouPEvJexkdqkoZyBf7

ZoYRN5Jkih7u+MlxWcseL80vDxYxCm4cJ4ynfo+TWHmd1+K52vEZKP7fKECR9+W6PUCtCK5TZ0IZvfg0

JanAvrJjRknI/ru+ZLpFdn6juvfMP1ox6D7QUfrWjp
c3DDQjl5gLLy/BexH8s0VSirOMbcDhtBhxGJu/7/gdzIQVfjfG+xXvakl8g6ONbD+ddQtPQ2W+fOop0L

LojrTZ9T1mE+oQZf/X7K8/r35fIEBVCVrDXXfn3oiv0klEyGK5eZix83nar+xsNF+GDxDgtBsoDRq8cU

qtZrD8CqKzcS+Kw88pT8LQVfNzAb4WJTNaEo6MI9PU
5hpg3NOSE5TkPi4x/46MF3r+8HiI3KTHllPsxldWc/fi6MRL9Ej4TT4LZKlardEsSoTaWc957d6eVLEe

AHNPgsCDouaa32E+2fGSZwmzQki0pMmbwsIIHQhSTrvN3Fngs99YVkzxqbrrogrbY1pxY4kzyiiliDMP

yyhdEGM1Ltbip8Bb3/J+plPGOFSnObYzq4YscLkW21
gX7B196NH8qWHN+VFQSSIMFKs6PEa4SxrwDsDa9QLESqgMmTCIaNk42Nlp23He6rvCVBO9XBZybxOr8G

1XP23WDLzOiOOVpXyp3TCVvh4urcHXBX/gbzzHZhhEo+74mL6MMGYqcAUKYNeQne/sgET3brSGp3C+St

OhlDyfGYmn8WYQgRz8+KN1l1oLNAtpI8Pvw56iCkUc
eWCMKd/rfvGMDMonRcrYsFnFZZkT1XBtP2trZ4fkC4njij31/p+lx97RyBtJ72/CtHfbCkQnMF9YZuRd

O4rCyMvgpbphaKwWXO6xbVa0k+t7rvWIQs93iLUxyh0zPpGlCEvv9Vi0EepzSZayPtHNYGbqxNJu359v

m3lpGQ3wV0gvdCSibMYicxorYmN+NcLxUkTq9qeHxt
WksslpdFsIr9QpvleP3cswCCUzUT/+HYuAqpGbmyKfwprto2UIIeZrdQrZw/9KYljRmUqgCLrUsxg5jG

psPnmr6ZLcVhPCvG+PLBeistO7QJBrgzFliFdQLa7OcCwxsBOOvrKMdB07CgvpSWkgxPwcXsXmlcOyYD

tyPRmvubHFbcNTEqPaOUaQZWonV/VpnMlj/NPTIdJH
P7OfTRY5H4KrFrFcXsecarUF2H2MFm27+F60Fv7GP3Vr37Tzm8CDn1ZXfM7xhq3Uge/npl57/UfNgE6a

MJXpt+7PNRwBt6sdSKleTn0fyc6oo4JbOoqqyhCu3F7duCkqTROG2tHNJUILbNGpu9QDcnamD45K2LNi

hSNmGbbT5nKPMlyzyrNW7e/X+JHJqOMJdYrWdehxJu
DYkgvxxiuLR6ZabheueYrslTrzKqQTzMuEV7HDhPBal3wROqm56ON4f+9u/GwGtvQ4AkscSczaNQOQxL

UWGGiVNbqh7v+/yxRqviPWpyu41quJ9VwbRQW/IX7WS0QCEnpoWSqtJmC/NfU9TSy28i+MC59Jp+qU0p

N15PsUrsq0wr9+Bo10dQ5+LkXSLzUl9qAk+dM5c1nT
iD6IZdulCHfzrD90AnVQALX575/JJ7w966Ik2y5nwa1yRuTohy/ScqpV/wvw3YVgLd7u2MtG8aKGCnbc

eU7Ty7uJm8RwU4Moxplr6cE/190mQ9mTYU8Rb8a3jq+tcHacnpFPk8Msxq9zpZ/tQBKLR004k/Rk9BRU

6uL3VDZZFq52JUgf0JQmnYeFwg0yO3naLC9fSlwj6m
plSH8KZG0oDrKOuxR85Yq9nj3XfnumKRgfbUvq9GsM5X3NyBFpor4aon9AkZmgfSGyH3lemecdiQDxjS

vbqblfAj2a5SwrSySHDJ+UgrnkPeUJhwdnMV3fOKJuM+4+o363sGnL04Q3+GjlsXhB2EaULbDETCxKPv

H6fikmxv5JHYU+6p957vn7LbCxwXFPVfoq9uHHWDBa
bxOyXRfbvbT9FfixE5s8xBFmrg6Nower289nPHj0wutG79iqKG7K1UOslt/SIPU3NFCHYkQhmacLresG

3OouKPB/VsIy2buZuY7lRK06cQ4kpV0glsZuANRQmm43CHTcEqvV3IcS+UGAs8nI0kmwDARcG9UDn5aO

M7j+rPk91FyPHxWBiOkga9dVzausY2PDNahrzeDvRx
74bQCRMxxmcwzO0xidBaSuZSkgKQG37A3VCcef323y66CHx+0oyHkW4bgCp8B0CGZgdQcK1jgtZ44nYW

I3Pl96PZhA8N/kO8JtHCEgSogydOI+NviEa70FtlnQtqaQderwphhCj95mr8X8lArzZs/Wj0dAK99ayr

kDS8et0fikBPgNappqUWWU+Foe54TMb6tu8IIQ6mCM
twSr/cItIigks0xv6uApl125G1ex0xaXC8lThV/e1x3r9XflO/4qZfq3t6fz/3gAJdh4Q0whJUekVseC

g370R55Z/R7EjQfprnQ9UmJPUtlKxqduk9NU9UBOK5B+h4W/AisKBlPm8w8/JNtZeND65FbzFD8G123+

ikDL/pvehOJtopbhBN/4A/a0ypZ6djsZPgf3HszwTZ
JUhrvbxqzvCXuqGLQH6/aQtG7NdyFzb7t1g9s2NpjhtWB9VSaAZrlyegeh0aqOI4QE30PJVZwVBq7+sw

iZtyQG9umxEfVCV5efo2OlqlpSfHgt6H+MCVPeAGQknmnup7aDCwmf69S13v1W8pJ2/DhpjkjkiwAg0O

AE5lPHbYSzlstivGcJnPxO7Bqmr/15J7dqXr3z/Zpm
HRamqftUR/5suzt3JfhUdX1LOucDhSa/3SrITA2Hyhre9VpXwo17nesrLrbI/GTThv/GdsMqhinERUmc

w8rq4ucjAmG6cZXJjOdPoAXweK0p81oONYMUXE5z7HMS+XUSC3oB+cK6H2DEj19HcYsXOugxBi33dme0

2V2cAtWHmKoFmbgq06HYBGwSKcMn7Q8oJpRUwcH21n
T5lUQq2OV62yHhQ2NcrfhDx07Me84vwFumygNO2p5nZiWX7VC8WlyxI6Xqz2fO9JZV1U7uWIuTy63wCk

/CmYam+7/Kjm/Fx0sdLhowmRnpEMeCt75EI8kKge4vUPPhdk7SpesiI44M0zXnQ+v4bFUSJ5EInc9VX9

ZllqFKfV+cpURdqxBTz6eY1cX9guCGp2j9w8GM0aty
SpUeyZ2y8dz31FMUXhDPglDga3/U1FBiW7WY7HR0BlY8TEJSqAt/rZW3HgN8ox7o5NPwJ4CbplZiqv2N

u6TliFCWohUGAZSM1Yc/gYS+vdm9fXJ4Nl6XzJ4rPmXadunWEbXawn1A0WLEbJcCm1yALCa98NdnYHaK

S3jHhsO3zdOa5/21BsLL8D+jy5n/AXrfKSMNCmVuZH
H9znQhzX1nklAlLexTTFGtKNIjXdjHZUseGALvB7WpYUScQh8kaZWtEZ6GUjXNZmKbUJKmUEH2OMGxZT

ItLREqGk7CdEy8PWRxSzVTHsKkXWI5hnKncW5eqkZpTpoDFLKvKIQaOstQHJflQegocMVwAP4MvPW4IB

YpP30jWN9SMW2awJxmAfUdVITwRfo4F6A5YrCOSVM2
FnnLLJVKHtgDA3QICxDwQTMwOQLkOx4mLVKRGiiJWnX3KSDvAQBpYNW0P3O1WuB7EFO9YiMcFxVBLy3r

Pj0npDEeHWWgXw7VxgEoSAIoPFKOY5YcdcAoCOvdNDvaADDqNSLaKm2RCQfrXDHfMJ8+KzW8icMvoM1C

hDwjFKItNFXkGAYyZDDYAT5UwXeUSDBrmMGJvPEJYu
IBkyVAkkuyHEx+BVPOhvCM562FQQxuPPiLG0HXlsjmumcVTN0xYsfOFuLmPQL1ifSiaX0KRF6jp1QbKH

4Gl9DimoG4mdPwTNwlESHaRwrmSNcfJMDDTm==









                    ID                  Date                Data Source

 

                    [id number] 2020 10:15:00 AM 

[facility]









          Name      Value     Range     Interpretation Code Description Data Felicita

rce(s) Supporting 

Document(s)

 

          E[facility]-Colonoscopy                                         [facility]

[facility] 

QDVZAh6wTpYCTiNfZYWcUhsYBRkvLLolZWQqW9N8DHpiGi8ORXaahwFjNOHxVm0+VJIrZD2byd4qMIPe

gMy
9fYGZoBjnbS8GkRRXob55CESZrHVsHSeBmLuLpOXI6LBU5NgTcRSW4XvGzXeozSZ7gQGU2LIXmUIcvRP

VgQZEhJPBwNQC3PD3aXTxcHCwjSw4ZVV9jv4GkHDVsYBNoZfgKFHhiGRujASKlHFMiTLDsB724jrDtPW

7OyYUqXVf8JJEsAqV4EWCdGvO8SZEkJvTbHiTvGTIm
S6Igr386wlRhtvY5NB5XT7RaQTD4NPj9Z2leHbQhUGVxYVBpER2nHuG3YYOaKm3MwUjmSVGbMRMqKx0X

vKe6NDQ0EWTsTz0+Pj4+Hs3vriJnCshMXLLaUS2cml02YF1EgPGsFQ8QHEgtB35rLFdzKy61KEhsXPIr

OnYxWVl8Ho8vWnSlg8JhR1LxCFb0F9yRKjecF4RiHG
zzYK6hNQI7PHAnCk4+Ol1fARYmZY29OIEnBIPTV3UvqcSnddNfGDx4GLStGc3+Xe1bzgMlKmaKPXYsAL

9fbt05CT1RNH9qnOdrUuX9SHIdS40wvTRfM6qkXhQvP9RghEqvZNXgKT3uQ5XeFYzhRRYgRI9vfnExaY

8HtYh5PWUfPc6PbWI4OWHeD78jFHQzUDKGCKLwc2Zk
VZ0Tq0ebwnAiKMAuVV7SDWTrH9VSO2BxT8mzxYojOSTiIS2MUEottDPdQMq5ER0QlLApRWIcO29pfD9x

UV93TMa+HnX4qcQbjK3MwMerkoLLAV/2AhybjTR3dzBBuEnJTVGKB4wEZcHb1TQXtjb5cQB7A2aRtz/c

GXTwze//7i6nj4G6ryyl5t/h74oG14doAvz19bfi6+
jfjqWoWDJGkCIkb6XWVIRw4OoFZWot+HglsDtwFBr3PGiPzYUUWUMv7Z2g1fmaTMQTaYKn3KSvrPcs2l

lllvrD0zZCDxGb4fktNLvsG75PS6QonmAd5bqF/pbz/4bvelY0ACX5fErzHoVjWM4QNZkiwWoOQCJwZG

H8hwD+nnP7CkVkDXWJXpFq/C1Vay57iSPLreUSCjLo
Kz1r9acaB4NDQiP7q/ERGU/QERBgsI2COFoYMwOg+jlWcuC0NGzsQ+NrXCAmBzBMpga8JOvS1VeuhMw1

BcQ/G6hGLlpUakqtn8naixaDsYqSO6ikJQs6y0g1sMw8Td5HRge4PYRwn5YWvBniJeBcKt+/oLCouKS0

zkppedDhkzpyi0w6x32ukWk9dlpnmb6+GTWgE2i7pd
J8e40oKVd1qLn+uYXf3f/MKsTMWZhy6w/warRQ5viTKYNf2G6tLV/c/xfXi1l6efFZ42TljMoXo3voOK

X/U1xO9e/XFFhq9sLUszEjoAKvv2Ass9z6I3B/PWKB/1fM/l9i/8IAFlAdvAI26kN7BMFW2Y8Lky/qv/

Fv/Bv/xr/xb/wb//5HL8F43DrMCSYXIUmPCJs8iYr/
HrdOpTLYQhMZiKQYIa6SQGQKcaZ5//h8oh6gbv9X4KcFKPr76GI/55n/m21FLvFdz/+rUIxS9dtRYIzg

esHCoPyYYKAZpkNg5pygpAkSOFIgid+Vi75+/QUEF7IZWGR4YlL+TFY+3DOjCeaVb1sYnleeFsuTrKao

toQkWvs86YL1W44K5vN9Wu1d6y2wiReecMrJILLksv
BuUzdaiUsPm9E5T5d6kN/uRd6+FeV4lfzkue5WJ6a0CWvNpAygrJetnP/cFb/jAjWTdU0D2vCfy7Wgjn

e+pZRsNO+6qOlGcmwvjk8UIUdPCqgQVHFtQEhCd7xiK5e/aTp+Rjr9R8a+uDeQtPsb2vijVN/gaddZfp

Gd7GKDgPiPSePhwssK57vaJY8e4jQ21EK/ONQadLwA
PeQrwzke9Knj/j2Yk/ywkqZ2sBXsBX8MnqLGZGSSiRIOusgI42a2YMNYJxn0lYgQ/eD+a0zy6gzKf21a

MrmzewsXxNMwRXaUoNgPWr2uxkg+cn0URphOakmU0Jk/WwOd4ieMak8LKa+gmS3nN0/dRJT3n2w6qknR

07JdXtuOm87b263JyiQMBqoudKqyFGUa7sWbYe5etg
mFbS1N/BqZLs1eQPKNiA9A+BdI5CxmM9zLGU6SOlEEv4arctoQjqK1XTQPiR6h9Ss0fswin8MqG3t0uA

LfG8dltmM8MxF/Gor99ULFrkBibbHwDXO8aQNPfs0zJfR7RVGe1ia7+geLujPh8GsGYjq32rBzfN70RJ

JhS7NUuwaM15m9IxUGnFY69WWvUnmJ0shRRCCJBXAT
zD4EDB73XBP8WFWcYZYXIy60nr7cBCQ4c0hp79p8dySfospt25Rgs83lePOxsGplKAHJAfL2xnasodNg

bIqX0vHm1v/9vYkr0AmxplTMnycq5XKk6w0zVWk/sv0IHgYOMQx8o2iXb7slvXZc4pr6DvkkS+lpmZ0D

uAPJinPWFratFkcVxvTBwY9sekIqASffbLC6T4fc2t
qlyoWsd1ZHl7rWphON5HxGgszjHPXaDD9HnmZnMgEUtnJqcGqc+V0431vQPFh1luuuPsRCeZSkOUTH7x

acZxSnbw9VNFQxj5p4b4FXKMUjNvlZTm4rGin/i6jH4h5/SRsuYfz2rKd5cAL9GMvKnF5+S7nd4dHAlR

OuzjF/nvuyls7QXQPUFXsMDpkXGvSyH9jkichhN6o0
+3aNYiY2YfTPWHFYX19inH7VrrcdCIXKzg+B+pKLuDNFv9tPqbwSr4zFxiy7zaLUDA14dC0/8a9G8KGW

J8YYriWk+IVpvQJh0pdwpGTBIh43CXdD6bLQCC/5wSMPKqj5oV88aEvyydCn47cqhvHg6j/+ymFoKBtL

Mwb5t2zlWD18kWTo9vUeeVndd3ZjAqYbf+kE4LAU+p
rhvkWhm6RCiv8LidDKpanpHErdqKLA0vayrtUoIfX+uC+E7y6OanspJs4PWBaMCNcWNK14qPZvqhMMN5

u9sOI/p2l/lSiieoqNsgBRZ4j1OFsRC9bfn1RPqOArSYKVPtl24q1h0KzaMnfgLU+29HQBI8sSk+6zhS

FH0xhNskBzArxmOp2J6jU7hAQnSCmmTwT9s2fNxIN+
sV8nQP4WNW/0sC41hcKpLgVunaVanjdQAknXhyVpwpGqWStM+PA6jevTTIKJK1378pGjVG81p9Ui3QKq

MBclxzwpBJ8s4oOM4sfhz8LU6qE1NpmGIc5eNmW/NDZHJ6y+BuoftoCX3vgK4ptD0EKQuQdQa4lMquX2

m3rtqRSw3YqDtVKpcOztN16TT6udrd5KhYSiCIpxZM
T7qGiNe2k5dDTidNn2m3uox0X+UWQQzodURbk59Kd9ExcGg5W8QiiTU0gR5Uk+9NqrEZDAhXduYe1lVh

eCpmfowmAvfEwlnxekl38Q9C1MNsnBhASC/DOd2a/OEddE1AUy975z7GsAeAi/viJZBTXCk15mhbztoK

zJ/TPdBUJKCLICKuaJlDN/D80SGT2hTT3iR95lgjcD
O/draAWluVVWObhpmaxwvKca4SeKCHhP9jKeIteyM2BUEsnKXh6UjjVjsPLhDXRoRg7hHOL4eYGDZWGi

DkmGbCC+aCHaSYZJK6boHzSfzwGqf6heJ6paEcrua/SPzjf9dB7vtzC0TvGiCGv+JkZ9dkwggn/OV3h/

rW4l7IX0opqpEKBG860xZV2zqTimUqxCpLvfVgxZew
SbSb0e6yqf/AS2Wo9/t99xL31TUklk2zoyfeL0toF9PzxBpt3QJCfmoojy/8N+4Sa7xEY874JpmUVlWb

kt0dErjFnpadM78vS8pqnXLeNhk5sYYWPFuKSAtJiZ7YMh4l7Szp3Yv6BPnBVxydZzFJT5t/AkzP/Gg+

L3jXlvLOuWF2gb4GnfP0KTKp2g0HEa+yPbabYd0brG
FlVAYCsvVo9VOy8fWfd/Wx95xHC/wLmGRoATprFLJbuVNY2mqeHJySTmnRwKgRdEeArII6xCvH5OT33C

pQX8NJc/xDJ7ULhRUplU3rqHOAlg3gkr1mUKnbZj5tHj4YRo/FQQSV2yxoPpaVOWkYEryEKmldfjC1vn

bFP1QzzN1Z1hn6DQebJPDKDHMIWFrgVWxGKMQIqpto
PLDVV4pjVYTTP+54sPNPPN7/TniwyltJm7eSBt94lLs6tVko8qkjaCTYWnmoUxzoGe7l+5Fir/efSZ96

rSTwLNt10xwvdEWs7dzsqM3v+zkg5WrH2gRauY77+TuooeXxlO+NbvGpKcFcEs+sioMBb/+jKLRdtXlw

7Cv7gsuQJN0qY/GUVYHcFibzV1lj+aBEd5uq7K84eS
53mw8bBxv2YV1UnywSWAKZe4zj5FdgwBI9GQ8UHP+potrFYEZqDu9nIfJTO4wmiItCKAR/Ur+NrqL0XG

ad+3ftMhVYMdbtMXH0AdX7LVDDdC38to5j6ubw5nGvUhaFzyCnNda7TTLBv1liJpXMxu8Frl7ycPjSBT

ivQtACDV1CwMHaAshLwfumw8opu5ZC31e5xMKk43su
pmqqoX4eHMqrm608U9j+/KudQ2g0Kf3ZpzkQ+eDqw9V/kFbEeaAdYS3E5d+dO4nwxar4uIBA0iFx2d6U

8ch1j3tC+K/c1jdKkKtibXkj1JEN20Fx8mgWrFeMahUaAqqJHLLAKegbT9luq2ipFRb9xI7tBmbcSEMB

9esPoGj8UaeZ2rx2nB6jVfT+l6Ien7eTKEaTtMpq3U
6Dvs0yLdkxEEqIoM24aNAZq4bTOGIdHIkh2/85XhmnY+1NHb4kLIV5RDIHccDDS8OIgGHNNGJedZlvq5

wf7206/MBbugS3mJVk54RRNHRgZ0CrCUp7R48cdTXU+Si6zBRtYecOCVhUOqQVvsDe9mG8zaKn6+CVZ+

1KmrsimXRO1OuR2B3i6EO8SE3PPTaBUaASl7lJ+HR/
KIlxCQWu2lEvr/O/wy+KPbgEdGHAu48vmHCRztyLKieVMmqiEtcnlK5L94XzNMPTlr8p+QaZC1amaL8W

m7J+REODws070jM4G62cRvFVZseagw0hPWeC2HzLR7t47BEYrXyRoZnDIXQsFRkXhvTdWFxeLL7IyyJa

qIhy3J/lkt+p812xTAAllvopVfuzs6UaW9Vz830rnb
65L3yNgDEDyQrJ2U4++A6IlWyhX6B//n3UgC7556djLRQftr7DcsBL5dMC+ukrwM1Q147GBFrQNM/RJT

Z7IfD0ufSnuf1vEc9IsRz/k49cooQcogjasI8OyVb5JDARla4fwXSPtPLU6Z2nRcbHLQ4uIfr3Pw61Ng

+b9gd1GuBLpKx5+90IWhtS+VtRhpVhoto94jxNSKlr
Iv4Y14Bohamn98Ud8RoD+mTd0IQ8abjiszqb67lDBPZgi11sqO2UYwOOxz4Clv5KLN3lsp8isC/1F2Wo

jVchEb/0sM8FrhQIVJ2U/b9vU8iaB/W6lIcnTXqeTQ0AhNG/aD7eU7R4FVUgG1YSh63FxhGJ8ICeo5Aq

kNO3/PPH9vCMWYwTVPL5scM/baZNIXGCAP9CFUkUby
9/jdZ8uAbbCG/Lpydj31114r9YMPs6VXaAAM6n30F0WvFyua8aJkNEhiHCpdAdySwasb8KvpGQ3cz1zq

WS1Hg8QxVmkqPLO1XDzuzbEbaYXkN0Ji66A0cZwWHQwkYPnq1vNzk9ZY/ilWZ1KjsoxC6W4DdbUlkPXy

bYFrrQm+FDJ6pP88LQrAYX0DIa2HiweImA3aGqP2pG
vdp2Q86hYl6A6CS2Iv91t2n0ZEo87e698CRNOUAS4zNlzpcDCcZ3Jvu8G3g+36tsBhC3Kujd6Yuesovl

hS9xTQ4Xwjdu/uwdw4SOEqIP+4xhyEm0yzpvAMonPGrySeRdbcciXQZRZvQEXRd2gO+plconleHXLlKo

oYFJvGzeJktet3i5r3SpaDzCi7MSriAf8j0mdTXfsi
5Lr8xNYhiKbueCD59gUzWCpFHdVrcwJC2T6jvPay++7LrJczycpgRuwmzw9K/CR7rAmTvVp2qnfmMGzb

1vPVUqBFu/IZXFcL/h6VLhXnEAWAAH+upLIbEfqVBXXEqAQpj2qF3ZtkPfDuIXeUp5n0Ixh2USsb3o7f

vRju1iiHOGVS/e0pOdiQinf0W7DrMuTEvWybLcgP/x
W/CI23R/gqqDxotTl6aVS2j0QAcyaUGqbwVE+rwMqjQBrm4MrESe3D/Ym84pU9q3t2vIbem6dV5/QO07

2RELQ9DDob0u1VD6YzEtg/QEaSqKM3K7KJixhQuu98IwVgYR9A7zhtKTZVUX9t8dWCPR9E7h2BUkT34P

B+ubAjwBx3CXGcsqOMxIkHhiV8lc8WNYQYlWmmmcf7
KGLJGl//CInx66C3oK6DZQIhv6hhDZf9cMO6xLsDTw8VN68bpAvsu/Y38ETvnCgM2cPIMYjf5G7gZPyR

6mtZOA02caxViN13XdaXd8SQ7LgdVSDBOmbEbMGEUjg70eIlYY3oEybIyg1Vp9N9Qt95H4Pr+VCxiMnn

AlaLwTDqc5qWMxnmvc1GmSslIk5jF5DONNHdUsDGSC
+I4ME7/zoLn//HWZjE/tboNKj9iti1yr3iTl/x63tIBqsBwtjFJwrKyw78NPKfUEwtNuxPRtSeTwlaAr

Bx/jZNKoQmts/RktiLn58EmvP88nf0m96jSw+68jJCvW8B4+Eg1HsLajRxuxYsFaSMBR+57BawjXEp+Z

6mnuiwQwygKe2zxPHhK8eZsuywqK25DS0gtR9jC37v
9uXtJ46gbA5xKbisvZXM5OJnxANDXJRQFYEgC1FxyAij9X32362BT81y6bCMQtIBz+3yEBseCd1OsFdf

lXGa/CMv4OQ2uzsWcGDr9+LRummmosOHXUISzIfFHGv/C0gH077ATnH35qArL8Ud8EzLryp37CzWUdk5

FP/vyvc8nuOdpVZBSvxrgc6uPaB5e0LzWzXwVWPgnt
mlhokB39LB5Xea40fs9PjaaQNV8J7q1B955ON+CSKcY7QUS2EK37/0ZpRDXwYf5A8/FU74ItC2pWVnQ3

ID+d9y1Z+VsteAteo2q3EXhU1oxRVDMNoILQVbKxwmSaW8Wrl8nt99gXpIiSscjKmALll6+CM6Z5XGfh

crmqFqsxsL7SP/nGnEPNW9v8BeU7tFSXDK3NgH/43K
8HjlXI+OexwQ4SuP4f/L109ASNsqxLn6yR8UXpFvorB7uk4+MjAcqfw4ZlYeR3qGNZEwPKCDs66QL3HJ

mMtJR8Lp3SQlqAWkJ7zA8XiV93uocM2TdlsOOac1szWi6fYaWvh6V/zncV5mNTFloAC4b0bM53t4GqZm

aHkgP7E6RF+mri269mr28I8/Yn3pl794fisqq2MfBt
1V+RYX4be2opEV80DRNMlmC3C4YYrlpYvr7GXNJNGxB4rjjBQDXWXwKcRXzY8bdUPzXeKSEJNziQ1lNp

BbSkUyrqBEp8CKO97Pu2QjveCWy8qjHNRsU1HymbKFGfOPG2WSwWGv6AqM+yY8SBX4ULVgFgiRD0Vjiu

j8cWX1dfjaH/WkrSLwi1Vbg7+fe8yFMcFebGZ0Lj9e
n9ls+oVTAtkpJgPxl7NysKDB5/RuWBKECwMn3A0FgT2gMyhu+rHcUJBGragwEG5hEr08KvGE5JiP2Jq7

l92LXaJHE2ztmmucFHwsHT67R4hpkWvhqB6WYVFofaixwmQ3z8KvH6GJOHVmWYQdGtNbLL698bDlVkuu

U9GXg9Qtq2q6KbfvZesN9gKpibPB+BubK0rMDp/v3/
Ik7MEj+tpdk8YFYbEugHI/pJvi5M71qHFoqxcTTv+IfGMIUPm7feJSK+zAxM11bIokuM0l1jLMbOz2WH

+Hj6ts2xKkuGCAFiuNfFG9STnmZNjtF0VLRXWqX4CuriQekuCM1dIpxJV3axBjmbjjJj6ji5GWIlV6fZ

BWHnyEiBP5kY2NXL4ceb0WnAzeZHOhOVsiuybFEdpo
KLxzpZJO2dqu15/fazR22/YeeHTDrqTb2sGc1n0V+Ptl2m/HvCNzryQv6kO2hkEo6t42I0pzB8keiTim

fLUlLyZekTJKuNfk+bXsbTucxQ93g2PZ2UTJfgfnzm2soMyyLl50Ooy2lT2G5eDYVA83JvujoOZXw2LK

YsHpq+OjXdWHPvJua6/r6s77z/EYnt2Ack/KY+6Hfr
7CiR9fCrCPaf4in80HMED5qq1gLgfFLcqvWEDhG3937As0cGhRkvQfXcYkEY3cfkDRu8Bmfkc2+rJMhW

3fstG9MwCMhEjI7ZPyny193sK79UzHEdcXgnAZlJ2RYyaUv06I+Yj4b9rOxKC/9R4jOh34Sts6xVrVq3

i38BfttfNnhY1G4S5IJFKcdcvuB+s3kGEUzSe7rP+y
Di5t6FgYn1X5MBqbVvK4Aac1ej2P8BjTp94qX2HDBUnvBgKAgzOa7s3YHQYPVPP7fL7PcfN/LLtbSDg9

xGevA3WYss1TaEXEyhlKTsfR8dewT/hM7633S17WBTTQ9MftFxEQMCMBUHvX2D+ZiE+CK5JADE2UBaBL

TsP66QLJATYbCzQaoMMZw9B68XfcSHTRosnfxHhutn
KfcaM4Q4lt1rw1v2ndYN9NhLdwQuwWXkg7bzD1b7r9POZwBKoMyc3mGwOQl4Oh1wA3wD4W02nVntnQWY

ocNmYUsvDJhCJVQssDMOcFDWNKAzfOy6SCxz4uzkR1ksTrbVr0G/Xah3Ewsvkr5E6QYvqEY3uc2SBueB

Gh3YXdqRXNQ2L98QURLDPslUVzZkqLQiehv/+JRdYD
UFO6dnE6G7502e7K1YmelQuKhkPga/rEjJmGwB3lsmQxryUG/Uu2ZDRn3a5US++ZJ0E9pRf263VObP0G

VjjWCV0g8aSrAyCyuXwV9fGdnyEFMH3JdX1TO6TQSjxUQVZcp27JmslzPccJbi2S3b2sNE87hg6U2qIC

Ta7penCtPmlb6bYqaoTM7jMjPuFbUUTRbNuqESsI16
qdjG3wKHg26yJwWs35yUdD6rxfdhj27HR8tskap2u7L3s/kgMY5Q2iZGHGdARCnY8XdPUVicbOSBcb/H

8+RStIWoGzACatQ2s1MV14VyoFrQ1R4At4Pq7GX36UV00Kc7OXw0ajwbzhHG726LLdu4nX3sfwRRDiAy

MCJpzsFdcSY5OKPOlm/Q6U6eA/JfxcovfplnfxqseC
YU77HalfgsqKju0nCJVRM0eh1Ila2IPwgxvtCiKSXMwv24ZTgiiqg3X4FpjlVKwGN+eHzIFsCgKfYHf9

rOAtaREu/RXGuPDKcGqNpByDdhdES47/Olpv56yCvMODz3+CqfMkF49LIt9BhsmApI5Inn5PPbnLMvwy

qJYLG93pr2jefQOU+TzFUPxP42PJUTHKz/KxDUk6oE
O4i8qKEXL7Jl3c88KmPvdDbmu/9r1PuOVt14c6UukzbBzw9Qnzl7a+Zcp7GgkxGoZBcH/B+X3BG7YKoZ

PAFrAlSujupTDt/CHDBpaFq0PCm/sFcKDpJSli/hfcwiip0r2kXXLUtqakIm5jsI4nN06NYHYVTjDXi0

f10y21cUwxawgueV0taE7UjBrguwpOV1j7rsfUSyGP
r1mwrP+oCIsEkycnr+QS22I17DDKmQfBYd11MPWEL4gv+2C573137hyo/Qxw8HGS7f7CvZvvJypL8ElI

dlA/czrlN4dddMpjzCKopnK7q3Wreo7Suk+B1h7ON4x36mmoCaZ03dtrRcHBi1dtAtzs/csX8cJvTGYM

jwcFFGHU5MmWJq+ZiUqgwbTZGLmN9nZvxHKP1fIu2X
U2JllucKJ+rxiK58nkHWlNYc1fsLytKlLqrX/7mF3QwKTo/CO3WcDM8ZM22u2r8CzEBdxs3OyhEaJMxl

GYj7qW/uW8v+ca5MWWm0+Phqj4Y7w/sGdScYYm9LTrdMJWDqy9Qnz9noisyDgQAivECCuHQm6SisuCdw

D0+Q+qMmzxlThGkzlmBundAqk2UXkDqhXlNhxi/Vi3
6fdPzAz7m4WARvp9Ju1NQThq8rSzE6FEcYLZ5Y2VZS5UyYjWAWNOuKKF+KYIN4ZH+nq8dekGbLE+jPC5

EvTTbrJksUlZMNl0q0t5Ru0f3FGAQ+531HAII6KJ9gqD4eHT6yS5Z9rQcvYGmolYF7R9TTvknl/lJ2i9

PROmEy26/eFzsDUqscxa8lHAKRPmgCf9NjPQD0B1Fl
/0InnVtejI9O8OBfhMfDKc9sTNGjSpgzf4yUnk5cS1p5zARReRbcDjB6u6z3yoXssDGuZj8UqKKqqkFe

oIACI53taEA6jx5JaETmnzMHSZrqePOx93VCvs+CQmwlA5evzZCnS8bNiaVkcjp0mbF3x/04i4OvlFsb

j/VnOlS66/LTVfYJaf5LkO60d/wp6j77cTmnWemGtW
jZobJgSHWgryUMwq9J6Q2xlBw21CHd13z3Fb39bvBF9w+5ouho/g4K/PIvCbJCOAkQv8edRGHRlN04ed

sgfL5lQ3twpjFzKC6Iw1qeKAOivdVb76baf6ZXdcS0wmTK4GP4ftFX95BIb9lx/xi0JjiCfTqN/+jd7y

JREOfQ4enecNAQ8ZQmlKbRg3os6bB4HacbXby7d6An
K/YG0QyhnqRPM0TtP4Z00GxtKZdvyP1o4zn447jfmWYJaGZ5AqbZt1P7fRXiCokSplORTfxjjXLe/Ui+

ZvW33Za4o71gJMmtREsCTmEne0tMF5W8MLhCQqF6NnMr1vsM87jUQStNtVm7lQUnE6HI0Da34lU6YQM9

0+jwKnMgzaSkc/9bH346FC41T1nxY8SlWwUh+MjbHE
gtWOiv7MUaIR9LbCUn4/Eowde/qccxevU3VLI+6rt+eDhbf++ijzutSxXyDvX1nA9qaFg/TNlboTvuwL

cq8kyn/R9K0PMQi76PdwDMhEA7blkepRidteJJXnCtJh3tW7zsHf/b5JKt5+3hZEWc5DCeEOxED4mo7b

COnC9QgTkV4IxzTKD0yAQQjjYmpLU72B4Uu8PXhyue
NErfV1PkbQKDLiTPDSF4i3ai+gdKwt9GSZP/vtihx8Jt2zPk7bk85KlXipwdaSQ6S+RIhrQU6lQmpS+I

Pm8NPPfhgusDsCYLKr2kz/pjYdz1+T7AYrY829bRMiHFKnHsawabigX6k2KVYvBrfVcnKQyZ9XCMMaV+

DOh48vDbrqWKRyXWgElMSr+TSuHf8DerTRkERzk/WO
TxpDST7AjtU5xTSauyeAF88ophoxd6vO3eqqjAZ+tkLELFtqUUtDjb8C7ME0KNwcSEk4rz1wdPwPXv5H

ramWsthmTgNWQox/+E1pmX5XTOk6nPln/Otcex1EDSiRRBbzyqYxG2oTTNBxOb7paaCHTf1CcwzxCSQh

IwgSq3/c5RdECEQ6hNr8mjeK50c4HZ8zEqgkqlz+IL
dD8YCmTWerMAmzU8IcXp5ghZyzbjcAX00LcERE8X6PJaFaFtzTooAm3HJF8xx/1WZ+c+mqa4CdQTGbs2

OwOU/z7bQCdzRqSv2OV3L+9mRT9qpC/9zV9bNhOZ/HKXKaW/R0h6HCNY+5+rqYklhm2wTVhudxzWCm1s

MDH+Ai2r12n3Fuw0AAcce2/TZY0UhFH4wvULqojUzA
5XfazAkifMfUAV6mohpftdmUC8Fufoj5FH0aePj/ZXtk8Dgsh8vSK/iLUExHqNzQFqu0aii38/EDSND3

CCd5IwDKQbaR0uGK9cW8J3zmf6KzKibljc1R7yopUVQEQIu9w5C4NEdalnWHoWjz+7FEE4g6HytDV/JX

KoYvyHZ6nrhw6KG+7k9Fl8uzWMazm6VdCnVjVFpxW9
9rNITHcx/hTwz7JX9vt8UPpNovkELXxFc+C5Iw1tlwsxOIXRfraKdip/srYUSqMPNysFexNRnqwuJSt+

eXjX1TULWAia/ROtCjN8hvW0heiAsEps0Ersm+2Jp+43PJf+43w0MFmAtmfjPlnL5A6EkAO3uuYJlntn

ZWCpBvIyOqjgoWzGwjv4dxUebjZfiP+4XDwd1rxtFc
7zRD9SF5m6CNQRxvgtRUcMnR/DBvYgkjY7TQYDPHAUxsSJlc0mvGv8HJ9lJS50kflwSpkQ5aBdFCcMnA

qQhL2ZTQaQpVA3YLmQpTWphB24QagEU9HyInzqDASKzdN6+mr+HeZS4A/RNe1YqC/jaX4QCsDygjGV65

/g15iSMlQ5R5t5RoYkMp3bPdmShNsTWTrHDgAZ2IFv
RadCjpePc5wJIGwCjl4hw4bWi2L0zD2bzy0vEXHXUysqZ5EYxQrsw9Z/WN/wSf5zJ9ld0uex2PGa1S7z

0XeO+kIy9m7ZUgo0cbsjKm1lZC+yl8piLCCkAcl8yUKq+zZhAvosBg0XeJkEpMOrLWjc2iQxxcgTNqRu

tg7UeXhiJCOdaCVqkKrRzEegtHLZ162a5nagmd4xWF
FCE55KiewNgD1kGWrEMtdqcfSCybBS3Og1ab2BtqrysYqBNo+m1dh679InNm1fLFgKBOGkFtM6qTf4TI

xuuR7zXo7yWpyWMt+zdXlP3N9rbunHXqO4wTAQJzSUppn1sGJX++q2gvTDcxrwZMEDlhptRkOP9SjP9a

BJ9xQx7e8Y6+Y+1EUPqNJvhvcTq6ABnazwi9T0SY9u
ZV2OaNCT9S1Ci77/Vb0a58oObjBPHMB5wvHCdbyJqEcuH2dsbNphRrf2fU8vx2DsUeBHj+BLz24DB+El

P0bwSTuRim+bn0Rt4a/imJBLKaQFVhey/BJVmbg1xs+Z1SmyqUKZFEx3gsUuP1L/dcR37s04MrD9eEQq

sSejhwwUTxRtyv2lqiNljbUg/JtG2D7RhmhYJU8Gac
EGgEk/bZVhZkdf1aeUErngdSebz8x04wsZ88qEE4i7FQRs9Z7sCQmSfu/Z/j6ZCpne63ibd8D5oXaKFa

bsVa7oXN+wt7DhD5aadgD/Q6TwBOmnqQ6G+YwPA2pzEJ5RHPJnu+nlS+z2gx2EgWh8OkdZqNr0kQzgX2

tA88QDPVUeYjHu6vOG/aTw5lHr6AJFrWIQF4l0oEr9
KEoRy72dxBpSo8j9CcGUX2CRY/LxsdbFcvP04CFhl/KAYWXxmeH108rtj3P8+XXePwix5t5ovq2jWQvM

Jf7oES9DTnY1nTuS3BY5pEfB2UEkyO22BORLn5fhu12ZgNnP7U09S6+y+8+n9ccHKkaKYjUxjSPcRqKn

E83S0UVZxHuM5XuDAltU2hLSExhdSUf3TG1fcOaTR2
hJjX4rY3YcdsV1DYs6QyIgJa0ziy/5GT27eSzhiQU1jaz3UocZF/I3/vXT6BP443pXFqYdIr9Xe5OKmB

xDn95tykPjNw5IDx5JELGNT5QUAK6Muu0nnCj40BSWAkdtGz4uvl/Vl9wz0L+crvgavguVVR0JsCnbbk

LZI6TNUWpMadycDv49x6LWL3+gKg+dPCQBt+pYhk8H
pFMyFqwMTASVd22PaOZvoRFkEfqfIPmBucCy8tHUQQgJuzag0sAv9toyCnHsLAv0dVi8XRXgyAdhPhv3

FWc2NRNkIgi9Cl0/U7ZaehNqnD1sNrPkKVa+bqOCbe5MbAiAAtfD0WQWru43dWOFbJU3/Sq3NpSYsrEm

S0E2Qh7lBMnr9vyBaI7jDkcrsuFTi7XoQmTMaG+biZ
uBuMp1sDmCIaUwRBdFpCY8dXE985AMug/Vz+UbZ0xYK3BxUJVc7Q4CByI09ftJZAIRVd7GLA7Cgluncb

47lO7VcIF1tRU889o85IfqeDusVIanGwzLTuqdSKa/1xMY9p2T33BW/KhuAq5i1LnWgqBzUMBz+tvtXl

p5rr+W7t5N05CiND60IzAEj33Xzq/SmWMZYxAzAVLl
BxirRVRErBA0R9m8558gNTycxuIt+CBnq5XmqnrU/Ff87Cl0nOi6kU9vew08f/4aDjqzWzPE8hYu9Iov

IjoaHpVbQEMM0UwzJWidkTpXJRHEQqya8rEXksH0c+uLCTlFUfxYTCgTxbodWqHmQ59edtLpscFEIBPB

GQ8ESyQJ1U9sKvyAZ7NQx7Zo2xn1MHolcgWcCLR02l
c5nfgOX91bMHUXqxfS5ZiR8ptO3tQ+RINMfG6fsk+UKUO95It2sdf0EJIc+ro7/FdUp/D8lY0iFhjxOZ

3H57tsUMSz2H3DnMQhe1Yvw+QHvdemeogichA3oOWdqvFPXoADYWKAYJrB92QWn8VbrLqn/MzI/5ooxH

o7vwiehFJjvK4JBQZAbeNK6M34UC9TImxWI+Bk2El1
rDYCfclnaT8MtKdy3Mfb6Y0DXTGUUpetLe5RFQI2fSbY3LZaE9APIdo0joC7Ft35reZbQhtePShwiUB+

fjUhALuaVzX0wWKwnUstfuA888KWhmC064RmqsWGYsff6IEosQSLdwpqY1+L0n/6WyKQa2S27y+MxtNs

hB0IADOwjWUNf3ndyGueM0sK1G5foQh0pGweST55EJ
lqsOLleaa709iE7pwqIfUnZJzSpA9mV175K3paAxbd/zEOXYyHEix4eOKbV6o20azvKgOWnXj8RCoBu7

6LU2yIl8IIIPk7xeVpPYvtEm1V8qoVsPSTJ3GOi3SbS73BfFCF/glcs/VZY1uUxMTSZTqPtaOb3bQO9L

sZyQrglQDBwL+fLH8qXKHVS81D9gdl7rT0opgmKUeP
C43nIywU8ZV9mJheniD94MLup4OHFw2U0od8ZKBt1bZcxmBpvomyoMAn7e3xIQUj1mr/P+3GSn9DGr9/

ZwqNnPW8q1gqOESYgFv8/HCtYepLtt+cSaIg6EhaeynMH7Fpgl3YXhwOfukwEdupnE1w8pWsI6l0slSO

gjnCW1VB6jKHybk/UK3t9he9yymTDu/4awXPHESZU2
3R9vY1BvDADljQGFDx9zg5aW8pjHwjhhhvoXpEQrJPW7g4sxdpSaPsAWy9LzNzj5fnf8vszFIixQaPLU

fM7xtyrJzVugEfG0G+GWLZdloW/F3OrFlF0NuZMJmWXy+bEW8zcwae0hoF0AxGP1JoBvGcjGM5WCWjwo

5BUK1l14jqpEuwnlBSLI2zhjQKflOYLJgXWtxB1pvu
hHNsQuB39aOWhcheke+ymckM+ND4i4S0urLe90V/f12hbUvpE3jE1BVXYViHEYKctliWEUP45xcLhz9J

RD0noRt6Ew7iWmlJ1Yzce3tI1HhYDNgjB8aF6Y5d1iOjIIa3PvSp+zuuM6UhD9rko0flv7EZGNlV8Fc3

P1MoymlVsZaQElVH0H6vcUm0R49/2+aHBVlucvcSYp
yMSCsVvg0MdD3E22XgH7oZLT4gVfM6Hiwarq5JVu+IAeEOCMQvwkzRztXH9H+nX/wAg5Qg7I4Hsj3f21

Om5hbl10B6K64NyAzPxorCGsuJabNaGb4TZj4IfUj2+IYG3gHqFD4iZLpthGE7sv9C92MxEMg2+htlsh

tgE/3pnpE86aOUoOHPJjYxDR1oNdClSJkH4JaodZuT
QAXf4HgLoHpT8c+bf1euAZT1HsZd0gecrCFFc0lNTO1yIrdbSpOT2uZz8C9A+JIKL2AfzSO0nzd0u7KF

h+xsucyntq/k45Epl7sy1ngmTCNHQB8mLFmx0qsfgRGw1ewFyuOcWFll8MFOGv3d+mWiIMDf2SlVuLRM

n890LZYcx7Z0ng96gUqVsn5gTkpNXS0vBIwa++xstf
E8dyCqkzf1xxHRpidemP74yNpEb9H/n7jK0c6yWDNU7XjdzHesrvryHQr5VawOowNj+ooDcWdzBnAc72

mt/MsM2yyIrfZR+uwd2QlPdww3/BRUInG5qwV+97j1mJ6yeR2yYlNOOlatVXiasPWQZhIpIv/KUKYRdc

l3beFI7jBQmpiiyD46otMjNz09wO1DvxfGgf44N4Fe
2uzlf06jAOR7aNsMD46Etrnh+w+aTD2TdeDNwv3lwX7Vwz+oNPHGd+ZN8OHWI157N5ac6vf6w9rDEloq

VxHXk/+UMg1RAvhz5FlhmLPAkf3xSxa6OEwWWk9GwPD2hNudpq/PuxJvB8xnp6Sf+OBmmxzErylL5qQn

t9/xIyZj0OSQWIDoRBaGXQeKWV5FgECyPxui4yFoaE
+Ios4iURK7pvP0W8i8WZsf5SYb6MCIJyU5YmvJPikn2sPpC3+5os8/FE2z9DYIvaX/ZzlItXPgwibyZF

2r/9wivE0aIwU4M9lt6uSNmDrqz2uhe0tb0DxhVRlFmyHOnQZrXRzyWAE+FYY+AMl92p59Rz7WBIkpc2

oZbGmOJ9tNMksZ8KY5y1WTebh5Xy9rUGjIybuVTKm8
OrloP/ZLngywWFdB++tyEhrF89rwJlYdrnLqaIY7hzTsSArDmoEensZaeQ5vo4o9/QFFzfCT9VLf2GLP

5u11f46jl7p8HFYqXTBS3DXN6PbaRATFjrO7mCz21lpF93LOXxQhpqf1XhvN0Z6ZiA8zPTFsqbpfbgij

NWUHdDomZPdd0UO1kXCmeqZWQ7Y8JD1WnfWpaa/kG9
+QgPsu5dsUi32eoYCICR8gYz7QxzrNQAMwzundXNTXrulYMV/X59KkG+rzE9zpgpRadmZO8zcpT/BRuk

F+Rf1kYpsvftpCmOZtq2ifZmpNCveOtz5dg79WmF3W2k0eJMe8HH+AQy7Zrzhhb0f12nZnOhVPT8YIag

GuSQGA78joeoOFiNpZ2mw7j0iQoLMepU5M/L4VaU9v
zAbOv8Pkd43MkkRMSyqj4RC6ONvvsKqCgSuGi1972DFMDpeQV1OfIUUVV9czvysjgq3e44gF74AdHyhp

ahfCKokDCP1eGecOiSPc7AkSCVNJd/h6LbVzNMB0AUbrk1ZRreHXC7jch6Zll+6XQmnIGnVY6Vx6FupA

kUk1aVcWWGjX5I+M74InYosJsTsavyBxKIkHLGMYuF
mGPczykavMgZ05u00EdeS70KZlsuST21zxnnOwp+KU8a1Pksgu3heb6QnI03d4/ZQqtrkw/Ug6rg1SSl

yuqiH8oQsuWvaZuyeO3FVclxkeXgACNbX7NT40QN2Gbs4ZJdCoBHfqQaUU05XYfHJmun9IMam+CCzRmt

+1pVaRBdBy5dn9q/f1gKjglUDAhmsxCA2PVfCsk1Ds
12I1tFeC+eIkUi2is+jHSKVuT8GYKNID9+tduUb9mQIfEpR55alrk+wKDx2UrZgwWmZny6Cd0N2PaprK

qAlKVImJGtpQyhNbs5PBycg9mlJWghh+KF+sgh7+OU1flcFDHo+YvrP8+5FURS3xSg3PvwWYGNiy7c3S

6gYjLvuC423cLeS1rlWI0S6cAFYQ6DQyY2CH3rKQOS
GF9MDY99Vf9XNZPm+1lzSxQZFcm8NIx/9caPX+qfV9cgVLwG+pFgREl+as1GTOzNrx/ej0EhqqUoMiQG

06yTEgQlH/+cwRWeLwFGb02WSepEJLi8GmZya8BWjoqyba/C362qiFdRYjDgh3EdRAwVgpNu/IwfzHT0

cdpVCBANEor62DeaBfEao1Kc6ceFlx7Z/r9kcCURsx
HyXrcNH0zz2l/AJ/jFqtHcdNxmpYHrfSEVd13Ns0v8arMkdDnvi5vtok0C3RsES6l2f2vx5O+9y8ymxa

ZYLNtdg5Lu02OvB5ck0nf3L/ajpkclSf2MnmfILGphOyXUo7aVfcsLI73fwuj2rAx8+gidz8oDRv0t0e

0XRik7KZBUYtctqIzhhU5veYECy2/Cl4Fn258LAJ7B
RYkpjrrwMm3MQj4qWh6m2YxpmacpYpvc7An35Ls7Z0rFzQ/ZEm6WlnbfwQuvvhKH7f8gRpIp8VAl4Uvp

rME+6PmmpGjPTf+TJIpNi8ibqk2UXZB8fwXhVlCaZwhmnTsA9BDK31ThVmmhawd41sL82MIYhzSBa/wn

erN7pnAKgwJJdx9XgUwN7cyqB0+7cAZpuOuTNlvPTq
aqeGo0xm2g18idUCLz7JHMzdbGUOozG5COYpg/z/LZpCyKuvobDef8AkNysPGXTBNGQx9rhG/4teQATs

XDd4oFwjN9Mioo6KW+hjmKVV5rQ6Mz7hFC7yxPJTjUwaUsfHSWM/1iqTCz47kAj4uQ6CSmh1jXmSJhqi

lbcNEya9/QweskNwgcS+ciIpNtyBsEpWhwAIIrOhe7
FK/14rA1RgM4ciRcgFBUb5p6PvcCvhoBIno4Dx8LrqBhe9AWXwM1oU6BDvRku+nvS5fKx4tr7dS7Rsqs

yQ7IDLO0qL9wjNIzObZoliqk/cGCr9/TYzwnPuperTTU7AIKsbEdKlyPlxE8RUafZoAMcCTdesVrYmx1

vtilsdjDQHpouh7ASiHAHn3X3xuAG0z9OZjLgRByWz
U4BCPv2o3x3Thvn6C7jYBTrg9pMZn9mXm7aAuP573oxOD/UL9yA6S//vxeZsn6qXpBMB9A/4Wq8IJEr8

RbtbKgx/e0eOa7QefRK7sSlipe9LquWKhKrj4FT13kN2ob1v3wl0/yJtXejprOsm1NpIuwJv7iyfmVtD

O9cEJSNmBP0ykE1mdqeYHoYCXqqeGioVxiOTR9UbW7
bbrCxlmeqC+lf+6l6DfdVV2RuCG9dnWuTiHaybINuipVKxjXTbWdfyaYFJrs7+bmQpmV5tJqD44INffx

q8raMpSCjR9YgA29QSgJRuQgor2dQ94huWO99OdB/jr1qoBM5QZHbg83wB+r8OVXAL+h6K/8M6ojLRz/

YE+vaJxi4GYaPX9JLiK8hqcxeQt0hWSxx8tIA5c4Ct
jd13a++rsEmjOj4iDXyDiT4WBmWPvMmWlfnvXXuFYHv3ryF7GrG4OmSrlv3HEY1mVA8dDvLNM2FRSQwb

f/8tiAEkJep8ho3cBRKhOoI1Jdr9e39sK+J8JVx3ZK80OgWBJhgapXxdB6MuQLaopxlRn4xd1+GCP3h8

ZPPgjd1bj67nWqK0kjR5GUZ5qiLXv+mbAMK2zoxTVX
Андрей/v039G8iLaVzxC5G284NzbJzYviwTsbIqGaTOqN/b6GZwH0LCAgi7HA6yMB2mk5aFHjqt78sbJUjp

fixaNr9p2sUwttg4Bsk3riGWnDArW1SWIb83E2oZ00kQwVC9v92Mafj3si7EaT5YXP6GgbxYWbUDykE3

g1Qsr/DlNFLT5WzxJoS3AzJlrApTFaVRzDVC6QbNvB
fZWjrSMYvYDujuoNbSZe8sCse15DYr1RYD2os/GtLWSMNrbVZcLvlcEaFerj9bwhRButXhBIf5IsjDKZ

MlboZouNv7CowUDwEKrhksUPe4zhpv96AFQ9H1sIKU28HeCh0W+0hP/XFXUN6xsjdFvrhnNkn2o1ResG

PGY83oJj6zs6k/aXkVNnoLOHpb4Tm8t9zTCEA1/JsG
SqjbzpSaQ+mnjg0uM25xj+aSFwS+g383pZhwDkwVc98vnxuM3AWmnZPlMRCCtAMIVKNMzOrIDkxa3M7g

X8Pa/XeNqqfS68S/gnNmonyCXhNXI0hCRw0lFYaziDGmmL1BTTrt4aLhKUn6KetRlCflmBf74j/yNnsK

XD10xIpMIts0XlKc+wcq+wqCXrEs63ROkXSRqmSfUz
So2qnZNSB5xegtN5ko5R/KeYqRJgF7YilCDGIS0lqrNcO/IfdznvXDOPLcLXAp+iBu1nGoa+zTMAzORj

FwjMBcHdaMJL/Jn/rqqdP/J0Cm2DVGfZt1FSpjLJxS7orpsIoTWf4zReFJYeIJp38q49C0gBpi2fj7TA

AfrBb4QlxLY+Fba2a0Jyl8IWQtQSeHze8c/r0wO/7P
ycEVjo5kCx2LInetpJlpQOJbmOMA9EzWC60fHcPF45zgf9XG5y+Z7NO72dOtiLO8GyLzIdMORoOmc4RA

LxB+7woLGc3yNb608AP02Sa15aHNx6hiCPiXNv3PBRbLm3t1Xuj9m6giycl8k9lDSttc1bc6SRbT8dWj

30ypMCjA8858z0tC7fORcDx8xqzXMQYVjzfk7S0m8h
IN9muRye61qCQ4UwmJtNEujPzjZ0OaPiQXfoTV//U8slkQKSEyT23P+8JpZ9bj1T1uRtudZnKI3LsPOY

UsHVM8gwe/b5n31Po5b/jRvY8xYksvL+73DENybDXGtj6YtmEwVqZaeEaEGBGZEXMApyr/G63jI+zLUA

9GUHPWvB/64nvthO53q9UCMUAr4pp9Lle/aH4hxMB/
Wn5QdgMyoXW5xRyu/f52xnoWQ9Sstzc8XX931/V41CD70DXWlnMtyyX+tTFpuD3R+lAYATL7k+4DBPyg

uywMhcn1u5lavWPxc2VADo6EtGI6TcQfoteL6DFfKLuljxwaXCZL5xQmvXAr0MORleGldPBxuvDLGMWH

uliJlIOC0JpYk+rbEosLBq9dTuvbc1AKSPOP9IXEuB
NnLqbz9cIycIaFF8IrnK3LoR/35SEgcz8gZFq7ef0ghp5yauN/GJRBqE3qO5cH9os2/oSpqO4Oc+LPWE

0kPpByJXgea8b7OQXiAi8UmDmfxJngDbMus0FL07MA/FzQ2F/wX/d1JvNdhxoIdEIXvIBJAnbydiU2af

mV4srAwUc3OrnvneT3GHTuWiNhDTn2MJBSAJQPl73V
+4+X2rAOz2kS8Gdrq6AFdBtQ6zh09OEGonMoaQJBc9b9PSkxn7Bj9IsMoxZwi2YIqOqoAEoEnr3ZnBsp

nLYhXUQ5lEKdJclpd3wRkqCf+6xvEy2tkQCwjWHqDJZOaOeC3wRa3Jz2QB3vleOVTpxgLX6ImNnGpRhS

M4l9NeDa2zDbCblDswGQoyswg/ZxfFY/9App1T9G7b
0/t0NzCtDQJcKZhR4I83jCydMew+/5oqFUaBe36JBUO/gI1Pv1uPrl8lZBLvVbYOZW6Gk5LqATguoTD6

BvKY+P2wwMlxt5E4r/e3Bkb3OkW91Lmx0/H3d9NDEhc8GaRBeXc4PeI0OPYTgmQL0FsM/ZNX8i5aLf4c

Lp7F2hjb0s0KytJKY6Ggt5N0sLpna2ZNxGilnt6Ff5
kAB3UN3lNlj7EkH/HcCiJagM5DDKciJjWkZ7eyg/CQ5is5LdMav8mNrnhaIi+yNhbO8BED/Bd9U0EbFD

kkfB0ckrwgC+Zn+VogVjj7w3OSp1jLUYulv1asH1bRpcirId7L2Jqb+CDt49H8UFvDomYuceqzRFNFNU

8j3ZF5grV5T1FzCkU4d/T+BTQLCTsz+IzQP50LnjG6
PMWhEL5GtyTITJ3VSXL+G0Non9ecEZ1DAXxJkTgRtFI11BaslC/XvgzNvffeRSTpxY81o9AQakc/xqMY

1oy7NtkpWBWJEcboerFWqzSXWDGTF80CvTgF3OWnBBLm1LKoa+kQAO4B1zwAb8yzGnsZdrg3xPJhDvsj

ZXRLf+E6me/GhJyRlkmZFh5roOUQ2nhKpESxlGccGh
sTwhlGXV7QoszXlEJSrU2ALunLHLQiCaUyJN0CWWgrnxbKnmM6+UsoOP0025Lzb8ZMM/oMa9UNGsFmJ2

fEJUskJSrrHaKDo+pdagFLsKKJWiz1B3y6GNDUQKiE/Cmz+U7xeeUpkdqMJ3I8oGvZiDJRGiItUOyJhu

FptjoI4qHyOTtInDlPYIiWidJZbMfYJbZQApV1qoTj
kUMkmNwYOXb27oNXse8CD8VhQsoCh+eAtUbJSO3h2hV653F12QeGelAgFtHSkejY5Vop4ac9VwAltSDx

FX7odoG7zft/OrnCWX3rhaOQ7XCF7db/dVgW2InbnfLFbSYJMK07cE/fXE2cenR2Xt3CBwvd5HzRZeIa

nIIX7NWIrXEH0MS1DSMok8AsSLSH/XZ2zm00jbZhba
dikMYXKnfW18hCtoFmWezjeHMVM8Lgk/fnMj2DBHwIaYf8n17NqBWw3ZVdn59KZUEQ5bNKH8pSvPzZIb

OFcthwaVeXYHvAho2huJVdXtxZ/k12gbxmq8+hRe6MnehHxUb3JQoKRvEqRhHntjBWLX6G257c9xstbB

e572RKDgyoTknicfxoXrlrdnUTq7LMXW2+LhV5Ki0A
duygyUsu2t+18UTXChG4+ynSly8r3WA07+i1klqLBYwbj1ka9gn/hPLls21O6A4WGTyxiJkp8cpVWe8M

hN6EUmv68f5p57BU3Vl9tjd2VOL77Xzw5hmMvz1oaGuAfhOPBcTiCY6EdU0aK7GJobz3FBYLSehRiXi5

gMoA4qZ29cOfoLsIW+tMptMyzcbm5fdzIhSjVZzxHH
4+uBS05D5pI8IP/S3F7jg8ClrEVk9iBtjjie3KzjpLu+NyqbjQnbwaJt1zhmKsn724lYWcORRuYv31ZG

htxQJvZj0ZyhE1c0fQwjk9NKGuidpf9Itoh1VimtZB9H6SZQAf2/me/FncgB8yc0s9RiXUi+k6pl6Jso

v0IqLG/BaZPktV3+4ED5FZbcQZH+BEXFWwnlydysvY
HCZ/jW2ce7fkk/AafOLaMczLtGQ2DHO02Yx0oF1nU6/tv8FDd3jIssftAXorlaib8KrrNwRgj4JE2JnL

gAqgjO3odJkD55fF8dSBgW17leyMGany9TkLD7AylAB4cwjPbQbfC3M+TEQ6QB5Ze26Qvr3QQU4jzJpt

INWkL6aQfjNhczRMwQS0kNeTg5bVpdY4LmENna3Xnc
JZ/stu1ZGBt2wcNCyK8N0eY85PvzKsG3bIXNyLCAKoVe1BrvCssAFRNVSuWV2gxjwq8opMuWEt8q9z/R

VbmMvZbg59JYuL888zGa0keTVHu+e3nR72h+JRYbDK4Tu3NOsUpSBUyOoRPI+UCybo4i+hNZJakkjSQy

EfPyS26knCpYNUybY1xTv5cXn76CRHyXsAGiTrbBFr
cCAjtlNRD9vY08KBa8KndEwI9gJ4sUP1QFzJ0p0o8igDiErYd+Kg8Mv9pl5pDpieOlaqUsc4f8vbPJOI

5c5ZW5ExjpZAt5vCLuRGnQBOP4SbXHtpCmadOtAl6Me2bhpm0c3JU0rqgI4Oo87RpUUC0GTZ1pB9pGoF

iajCm+E2kPTui87s4bk2KfBUbq1f8khOYLt+hcOn7D
DWM6qfI3L3RV2+OIaNOiXEgsdMU0d3b4u4M4gv6sdWBaTNC0UQ8EPfbnHbJOIDi7Sgqk0frngaPKSGre

Ejy4S+SGu7jnFJ2eKMhV6mh/T2gZiHfes+p+uDVTNe/72wvjpb5xn/HL5tiltfDqz/t7rrcr8zozU2Re

G4XgC94j+2h9PMyZREvg/Ft9C+6DA/zWPwk+NLIpv5
P13vyp9DKO3dwHV6TgHHwwOw5vFgFbrq7wNFdptdPNH1MlBeYlNyhLocTSn8Td7fFc2FKHYW0zDpOmL7

r1fqlWaNAAxIuCo8ImZqBpM/LlD730ql+TpOcjnznkSOImHfCXmIcyvHEWjNutEPi6sRCwiFPb7mhbEr

qiP3pDlyYBbrrbrfgEAJBmE45CVIbNL0JQOts6HtrQ
NxyrFua5Djfn/OGNqp4WGPLqQi0SVXpYZV7L2Ovv0ujz22C1O4qnG7xC0AGLhCah3zwhPlSlSJ9chGeW

CvXbNMPtRzdKfG0HmpyHDG+NpheN6eyQs3ssTZ3NZ52tUbPgy2+mKD+DkNEPnrAbsAz3PinB1zI+PgrA

fqvy3UWadjn5T1mNo/bwpBOqmLgp16HSVhXKUWAmcb
CzN1dmGoptstYM5+av5jQFoFvRk4nqkQeJON+uF5R/rPZrZ0MkRlOFMlPliobOjI7NWCptivL/z3v101

qfu330uK/zkXS6aOBaEZIASroR14L8xiEp7QNKXxliQeu2ZtbS+1F0DV98iiwC97SC6ng7UGDyY74IX4

M1a7btOid+IyH87qF/cVHTt8/M7QcvL3ELgj5XV65w
rmo3X2zVOQWvCNppw//bL/4htR3dXgcKsmkIWanVQu+9pw1cU53j+A618dOp+Df+jX/j3/g3/o1/OWSw

L0A4szrRMuTZYBRRXGkvcgu6J2QN5e/iCWJO7nT6cELqLR65Q4UFGRAkEiTy/a8L3Y40ActQuICruEfY

D6rGl1HRX1eDAbQmJoWsTy+Tg79G58OtUTY4bE6HfI
Qv4xk7BohWAH3/W06Ooj3VCbX/Vf2tDml5yriuPdQ2TlG0pOrh4SmN6W/9tBXXe0Z7fzL4PaLus5MIYu

SWdlHsnbUF9MaS1l6nt7d+74p42bgljKwePxUd1/8K+ab/gwjvwkCaxEoin1uq79Zx99r2USoTCBdM0Z

Kl8/N1WURY+xhFJ+c0wepW9nK0xs8cTh0a8GmXYITH
SKBRczOkvJ25VIGSUxiNW1I/ZLCXvsvm5mgBtnlKiNT7tLU/Plaf1mEcEjvIgr0kCNDukj2BfpwiyHZO

aLFfkiAM1UrcrmKcdU9W9Js8bThbQuUU5T9fqgpZRtkQ9VeXHLTSJTmBbdazfPRhNijcwe1Ph2Ns9XAm

qXHzDkSWi4DiglJz6cNhzI1xCnXLc/ilXAnRqtG9rm
uMT+ZutwyaaiMxQzITpNOTYTHSFO1AzwgQCVJ54D0+3EDvt/pykXjqszmbIcVpQseITlFpvU6pDHMj99

bwTOShAlZCF7bNAGbVZex9BcIwkuo2cJIvQZXEMKQRL2OZNdQqVdfJqg04t3CgoUad3wlqnV7d92vCAV

oaEELmhPj9sV6mMfi2RcrtuI44ZinL98SlvxMK/CFc
DO1dJ1p5krAcnFsBW2mt0ihI1X0UuC6CZ1b0srKbjUC07T7BG7hqbN0Ba6erUEPAo8Ajy1pkggQ/A24a

7IyNmDkrQayen6VAUnUjl/dqfpnGCHrrqNcxha54zf2RFHp8ONHgHNcenRUgC0tdehZse0SQ/YfRYmsi

eY72xTqjfuIBK6q7LObS6bPiKnYfILpIYUCEgo981G
f0HFha9ipbd/uqLr0CAb0SiCVgF7zm4PUGlU59CEmLbOopS5KUIKSQaF9AIJSaZIK5nx6csC2cuYAPTc

J8jOMcvX7q1er5bUP1CGb6y/liD/mifdE3I2uiFkn2IpByj0BfbYWL3h2Y4taqRIuzCVYGumHbVk97Ro

xgj+tuf0DZvEI2htIvDnw5nfYVbD2fHvtpPLEmnIO5
Y74+XGnlOtJjMMY5S5tP3FvV6Y5oos2CQEhDDAbip2TMmHK7Tnp2tfjq8eHcy0RHdsws34KLHB0E8qmu

abrU2Toosjy/kBqPe3UwCaO6/LctuJxADBfs+GoEaGtETrTnu/UUxqXyW46VdgTS3yOwr9TlShmaeMxT

1g54jEqAwTY3SAu/Y/ZtGXUoW9oW071uydui3kfjDB
RZ2I9fPbtNOsIQ+xrsY+Ja9sgsAebs/qG97R2YzcIVF8RjgtMnRWnaoiT2Mu2G+BzqzVGyXNKVLboMH3

l5QAL+qzPZ5Ps8CKARV6rcpbX4SVUI9mg6gN7ojxTBZAZx5Irv0BN6FkemYdueK6xy00e21rh/1mhrHC

vIY76VrOzzzxnpsoKYTlfiYY09M0Nnd3O50UsJ1J8Q
TJSYXGtflXcHDOb4gvl1M0Obtoe8zE95UqgPgC/nib/XMt1AiYbZ2shjIKFcC9ZFkZhq5nyyMB6VBpcv

TbLOdMkFfI7bATxtJoYRhm7RxgWD9WtOSb9y7+ctg192divGNVyQ1/zn6aY6XCiug5d6QmuBk325kS2D

oGDY4+0pg5YdtE+jDsWT/xKk897DRnit8jO3yTNyR+
zbHdoKU6q9CdoDQPfRcNS2+6XdPQ+6WOtk0Ji/BHY1sH69XiU3M6Y8DjcELdLW8llAXOd29BG9qOoiJ9

XhI+/IWna0mOXqcM22lLvmX2fPbPqMwDNBsnJuwGzeFV2s4n/5XR2AcGAC5rd5poTZzB/Hr+peRhyg0q

ZFBCqZPCDg62bsuF8G6M/8d8QgZTctQW5fxucTYs3E
EwV8NzzL0/GXsIcr9QSy2fSIrGUs6pA6hL4HfHOK3e0D+lX+5bjtkCEQvkM7Wxbdn9UBRgfVeFQhp1c8

u1EBOu0o+fDvb+4WaFv1vBXztyPjpQE/hHH4M8dQLN4IjIufLIMXOgTaRg8QD3yfACXm3xbP063MWd/p

OLJjXFPaAVuPdpv2rQBDuCPQv8yenJ6OK8bzFgfBFM
EMhovH47vfErR6nU4MeoEnt9AoRyGQ/4imkZFpDiKSC84LEQfsbSffsfle/CJnugMLKss/KV2zrsJKvc

BYnhY0MDEk6gpRFjCkai7nZV7fkeeof5HbrFH8xUmvQHr6Nd7PI5JSkzAXQlC3JVyUelu77w53+4U9T5

3s//2rhdYvvt09XyjG/6fVhUgC78+RRInlUoa6DtW6
idbohZpScAZwhRMr8BvUe002dNBK4qtZiw5SeQKk3RrRWuNljLuEpgg7jjVnLg+m2lfGRjxPpg6Y1HTE

gx+J7H16fp4xWjfAEeKQpMxfoQGEn8Ex7KpDnJoPuVfC5VzhB5Ao8C3h6b/nwr1VQSXd+cnfJgh5s+Vs

M8vrwK0QdmarumGYjF495WO7c6uT24jDx0xRbO6qjk
XTwO7FESPPZlw3KyyvEQ8ZhDWIiur1PuFhZZE8sKoDqUMmNzvYyyRV7o2Qg1vFmmjGpPTngv2q2sC4rI

DW95XUl/9733f1mUxT2PwhDLc9uj5smXwXOPTkFx+ztuH0WGqbKNTUfDtNG3jmdXGPamY+Kxg4k8UZlb

9y9smB0400I3s89RyC4N6owiAVbtTIRNIpmshwStJv
UyLb6/Ji2vB0kO+IiISYj+j9jG2BcgbBZEO4Xq2rKUgBKkrIPh4kO1Y2BHmviMJcZV+5whTfHnVfjez9

R3wt+Di7r3fNq3wh1tori0jtn8G925L+6TJP4znTieSGsHcm6xH6xw56LkU6MdZWAavtknflY1Y5JBZX

YD+9FjfO/TyBVrrrYR7fX6kWj6ONhF6swilabty2U4
6xr0oOOQC5KHbJvINZsP/l/uwIVzUstGYhGp3yERM/tMrld3LxNr/EmjRtAAHt+KV6wqOfm0Wv7tEdhw

rVSx3Uw2K+4gseH0vy+atH6J/fjswkSNPbGsVkmf5JDX2LutqummFJMpCi3KEwYSnkr1j+UbQRXKq+7E

aTcVIVeCVHKE535toiqQnGetSwVdPX/mi5HuEG60iV
pW0DGbtwIbCLXM8/G3puhIhiGU4zA4eh0D8c3bCB0Li7nWhQ2I51juKiOiFRPPOklIsKzOZaTTXkivHt

MfJokVTDZqm3ifciU+2jDhH9nHzg1Miq/TxMr4oUZBNZO5fOwC0uaQ6Yvyi8gBR9homfopQsxpIfHbhs

xWerXFv/pr330YIve8lCVE8t25anMvRGvaM7K1lKsz
WVgQsYUMZXySp6nMzGq5Ka7+x4aF+q5VSw5dhnJ00xEnddhxyPNPv2qNvbQ9nROEKBDu6FXTRbII60Su

0hWNpsWuUzoUIdo7WsFtf6MxmCfBAoWvgg7r2WeBkF0EUdCkD7xa8HjsrK0SHdhLsH69+AKOy4qm8r+d

Ng/iMevrl4T/6tK3r2SpeLBkiBe/pz3Hzvj0/zgsjv
CjUeSWUU6C7f4hiE0/XQ0uQIE7QCFHaNnH7DanpUJ0aDUNPsSBQ+JBaleb8AOa0YgDAR+N3OHG2D34mL

qV5HNTPif2wzkzdH+hivymg/+2ZpwTTU3q8U2nmSdS3H4kLb9jYqTtJvqZaq7pqliiEszl4WTDndJxmu

uSkhJMU9Dx/lLUdlwbIU+0auqbvHsjhjgweUoK51q0
WD+5I5/pxkgWE7EIKaolQYRTZM0fYDE6Mr5gqGUcczlx4qzmVeP4pymxU6brxizNHie8S/3aJ3L+i/tD

wbed2iA25oCrZjxd1zBbqQGGpfGHFziBDUK23oQ7Hc4WPOof2OAvRuJrNZtBmXeG8a8Zk53B5VcPxv19

QWCN0r8clKkqajof0mmKWtS/QaO/iiedlYR4Mte+KU
26cLo8xOcYqHUAD72BbMnsog5qURGezwUwZdMlI58yV5maU21ySCQo57W4Z1WhsSawx34ZAA8LdA1oTf

+8p/33f2DEgjDtw7Onl8/WegZu+4LtQz7WBkN2UbfIHKj+MBiDDIyhuBYbzsJNbbN79kfrxYiOsLcia7

2LBa4HrW83MouP/anLfgm7jGRGq9Jo1128isa6pONc
AXk9aIgdiXKQ11sgsZV6Qx4xNMbeUS3HE4iKYfQtiT83VMG2iRbh3s5LK+BIGjTAfYA3uKFMGdojIHn5

lC/QlB+RVt8l9KrNahc7nx7MnBLEx3d1M3HvykI7HQorkGDaXOVaaWhbTc7OZrJZ6Hesr+sJ/hkqH3I8

BS1YQ26h+QhSpxsHGxtBAMYT4FwRjVy0BGS4DaOZTu
Ps9jZ6RVHPt27ovIhBuMGp73FHBBDDGzd41gejGm3DRk2FjzP6PKDIYu7fCsvv7738A4prxzMZjBmXol

QxddgwM8UJqcziLjGu4wfcSrCxtUTJhB27rb/KlAGnMDDfHb8K2rcr9e+1sY+eYYb7/bpnY9zMIBMQGS

8JA2XjNXNzf3d9Lgu71LT9WnEZ5SnzQdVRmsriW+yR
/2t8w1b24hXQFO+Nxs2mlz7gyT4wke7RWdgQkOOayUMYkgnuVn3n6pk0MEIHit55gJvmxe2dhg70t8OL

qaAhWHP/2iWkocrUucQclyzRzmjNHgN1mew1MaPPnvVNsvN7Qr0V5NaVQ3SxAEhwJwaOLYZfH6pQsk0p

SIr2pXGgmThzkfuFRrZGWHRLVm/i8cS/62IW5eMjqq
6waHtDKUCy0l+oYBez65xq1ZQmY1odaMKDnU5oj2j/0NNA68dO/x73OcXNo2nl45dOwV9TSVIgYVFG3B

k1cAggUTtyang1+5Fmx2k4kcrRFfPskHuzTMinCfVc1Bv3us1mcinxfYAhaJ1y1vxBvpzTtk3Hxq0pWN

jx8FgFUpuphN0okCOd8aETTKIFxNIhs4qVez1g3uvl
IPd70vRNQktwzYrDS1xbIoA+U5xTN/72u4XK6ADAgbXn2i7OpQSz0nYSMUPz+kiQRIHn4fblst8yIZWM

7wZWp4vPv4hrEX+jhbKNvzJoLIr4Ssg7lE3N08T4xU7QmuLe+RA/mGoRA/xxtc2McKn7uLEaOoCG8Txo

m+H8C3QpZm0Aowexu24+KeedwpUmyUy1O8shT9GEf3
N7TIKmBNDT92L7pOgQT99xT66drj18SOobs9Wb5W15JHVXSUZFY011fEe2XgLdrwoybIjkjTTYGDcUQF

w2yKhdbNTaeZNE1z8xr8Fq4vxJ8M3DMzh9uMnVVs20oRmH1xXXFJW2PtvKqcWg4VhM97Nx95UOK9hfu9

STx5EhayVpMf3tQauZt0t4qa1Rwy+1oC78IpNlMQL/
jKEPhEVmyPbiKE/7iCMukzuDlyFdjkF9ZM+sDLU0w5/k1nv26Iq4ALxdI5AuGHTw0y1T+UKruxC7bn6B

6jv52qDC4xW055OghfEBnRb/ckQUqTSu5ce7EhvLqioBOveQsDHy6Okxme9RDBLeb5lQHrAQTdmh2K+D

/7deT2gM98uCecgNAHzj0z0Q4nsOvYIccap9XrFZry
UXQp/BoymAlQHtz5T8mrL2EokWe+46N7M/Ld4X8LhIWF5hF1MqRU4lBWqffacwCRfOFqSf5WwUj9I9iX

ToyX7nmSRwq2xqbado8xZNfjejgz4HAEAWAQH6ipcCjRJD5xKPxN4jYmWde1WxciVV4jbb4ziP4C9HeC

TO2exjIBq0BdDjAXPbAR/VfB1tCsV1qdA2UXIhGbox
/ApSPr/TjhZRei6ca3xdhJjU0vfdhkrFOpxdIHnNWTKGZBJPdUcru3agdcCFH6PTkU/9VdyWacykVcRT

kNbwq93SMEmN6zQxHb8quBFL93dd/rKJ3WvjEHPMISy9D2r56zgqwsP/AeP2UJG/dWJp2ZLDAPWr1TK0

0IZsSl50yfXHzjt+4IM8IQHX/W7e4cpy82UKDYuuUd
HBnIEmMsiTIwWHmeW/EkJNI09/GjaoO4u7F60j0Ey54mbMbBFvWmJ+Q5pTA0Dqj+abP+ijFMFhkxm/u0

FiFZ5h0NbXonlnS2URVM93btCp4eQ8Pz6VVZAtp1bAZ+qjOhhiWuqh+c2xzD9MUaau1zQ9VJy+XbuSVf

AGoHyyTQEUW+J0v0qnGX4zAY6rI1fMH+0pgfI9q6dU
e/ujOmZs9Rysz6ZX+fWEsrZ6kLHLy8kpNtu/Pdt8lTVv36IY0cYBaOcjCXQK068sNI/SWvjQJpZt+Gs8

X4wfbHa7rRPP74I0jCiLYaiQVm8OQOpZ3o4b469aqENimYn8NSH1RIJKI+OIaDznUYlvsx3boymta/gf

5z6bWPPgghtEScFTTb0/6TWdPamr70RpQfdW3Rm6aV
u29T40PkFu9+t6U3wzmjzm6r6ZF6f++2CcOqzXxlPw2B5SGeSZnH65DwglGNhyZ+OCWs5Z90CZGw5w3k

F650x1MOVnmwQuBT++d8DLXMOW+qZ80XiREpBWK94NkuDjZnPZpmSrRxzghdD//GWAbsr9S/cvbwCax1

pFDlIvtAbrbb4eEB+bQw6edUQhtFP4dIBO1QMwkll0
6OH7Um+rsOQSZ43q49IlK8GS835EPzQwygi+FA7USJy7/J7/BkslI7ynGLQGP7lqsqQ86gMl8C8AdPgA

1uDtA9EQruxTHYXGRRzF0geQ245OJrJ/jDTMXfQman3XAexuUkvUqNWrq3zqQHgDDRwEdIAIJOiQ4cMK

JXmvDkd9pym15vwGUmf+Y9+Evvv8lJr0fAEM+2nILz
IMqzoyURH7Kak6O5IfrglCyTnerv1EXlU+fxfvP99Xu+/Z2GBk/mMUkYAdrJL39EQ7proURF8H8d8XYa

Z28RJu5lJCRBxxjqH3cGspI/mye+HTUIKouo8rvbv7XNaEmodzz/nnf9vGxFgO/z6qr/fZ8fZR2x2jmt

7oJLPm395w5Wfauwed3fYD/4lxdYya1ICrEuoU/AwO
GtO/0E9Pfef5JUloqZV03iyJ85mT3T/5zQXJJIPqc7GNlbhl/WhxzyhrK1re8YQx0V6LHpqpSXadCe9S

uCAnWaak5b7gHb9u1qNROvhVYi9k8LWCj0wo51zm3XmAN3AYfOrpugtY25EWcOz/yYtfYCmafbRUpuQx

QbSOAPe0kz4SV6D0wc+emkmKN0+36jUq1PG7+rpY7y
P3+d0rY75RqAWUUNPXO8v/p+4v4WKMuaFUmLzhvyZSmqi74IVy0PmOKz3houH2jMZXkro+5OtPioh7Zs

Vsj/zeP9iu5tQz4a/5ojRG8/NdHAII/rU0IDlgHx5AvT3A4yOVBZhL4mh/DWn13YbQPBdXFMrjQhXb8m

12uqQ8pzJDdv4izTYeLSHq+iDvYaav1oSHEUhSX6Dg
dOhbm4feibpOAXVVnL1N4FtSWjD1yzV/ebzdw7oYUWIrTcUgNgnp04LyU1QNTYSSye+MSl0kWO4wS+hm

p1TdNRORSmECcSyYG78lFt/fSOdf5EUNIkG04q+S237pyN5HwoXIH9rzNMvZubIxMYP0wXdIrneu45E5

pD2vVbzp9roFmQbu8oqPvV4e2s5bbKobQvEFYsfc/y
dZcYG2iMImC7VMvODoivcIQIwHMcklWREL+/VmS61WK2QSiKCEcsDh0Rj2ktnu0DecPh28w3CVtWzgpq

SOJAQlfev8UOlQ0q8anIQkt8wqWDq8hjTvp+HEb5QOd0c1E3n2LXwK0kcod1GNza+5I3zmWKeoueTAS/

RWHljWL464EVgYVFk4FJQR1UrBknvu1aNvepwBL5Wp
64PKcfjy9y4WlXKPeje+/F3NoeTZ3PP2Bn/2S5Vp/dXKnM/TA8MX8/aSzkxrzDoW9P39V1gNlqxBuR10

CwIDrkxbFCf6mZd1b4xhdmIJ1vOXWDc0vBk+Oj+kOeL+o/PSTaT8hAMLrgANZOT9SpSA+T9Sn/dHxgRS

R72uN2yGU8DVb57WYUa4kz93FhbMfcN2AohYy+mR3S
71fmB5nmAF3q0HtJQDRq9LXyFJyEzH1cahXnOCc5TvzWOcuDjdm08iKF+nhfjSZ5hSgOAM3ZHkcLuKUh

+F79AkmOnSZOz1/tOzLNDGtrZSaSxltL8pONvdUlCdvhWSdw/jxm/0k36nLRD9apbytW14K9S0oXuLDc

WaayILF+TBjDzoN1AAlvDL/MyNVHlb+cN3BNdY7IL8
nGk5hGM9duVFkKkT7SkNWm3eNlVByF6GPY+vY9r7l93jaNvyiT20OziPvT6C6WjKcAIzcwnQwVRlMwy0

zpHK2XjjO6j/GbKbtZGv1qXBquWZTmd/oVYG+GopDmi6VU6HeZtsYKB9Wu7juW15d4SUbdUDJtfTms+T

NpdzKScUGTJYiA5Ho7XYP6Tn3bwXm8HQKE/vpIQZDp
LE58eEmTFZQX20npOl4sk4aaT2u2xHKc6zgACnywlPnen1sZKkoGQcHiyNy/jT63iOXEpzX/nbBP0/TW

nsI1KRAUU9HoNZ2ptNeRQ6Fu+s/wcBjRd6nRTeYKGFmcZec0WZmgZ+e29dWrTUzpoh/YnQSrSkpFZCgI

g5hqfWOSlVwVgD+YxIOndXd6NpkGZ3wrlTMqoz9W5L
dnXvyyVANgoxDDR6M1/F8i5jBZjsz+KtkWgPyYyp6EVfa5jBQGGjhXBI2ptB8oIs/Z6jJKDCGtvk8V55

6vzj9j93S4OqIvohY6mfPeYctQfwfQke4NvfEECgZ/vPmmNcOyTgI5wbBMh6FoLFyQ6CTJ7Z+6UboD/J

+Tq4EHOSmVYMgxm7Tvp7UctAPP90o6wrnFv727QWzZ
bPEExE10h+1J0cLwKtEpg5QhCuG0ZHlPEJoh12FH295KluJmoGKm8SYvic+zqNeKevOhHgsZVT1OP2H0

r5D3dAfPlOlFVyHUa262k//J6jSM1XQ+bZqc91o10K8g89+PhHXowu5haJ/lz2fCV6H7rJHAZNiqB+fZ

5MOiLlxV35wp4ELlcmDSexG8NMaNRe5S9aXrF3PuKw
TFsS9SQZl1doyJTuqjV+YS0YjE3fGXQUcSgyvhlYJXU3EukXIeUX8OBWKcS74c1mzoWhs4h9CIKpNxyo

2aS3vvGVPrALNVZZaumEYR3Fg94p2zp7lbBnSWpbZoCivhlq4rg0EgvEzD+pla5kFY98kyObR3Sy23UM

tnVa6PYgdbRsRQ6xsXsFZObr0kEd0NYjaW7KpHfhQp
GkzVig6r+E6Ru+4Rn7f9zsR1nPfdZVj353wVZZYYMH5JMBaq4kWw3ddkhxFlnpxy7GUIqTkKeZHDrzFY

obLx68p3XxKvSqSdObPIhArZF9lr8BYUY2SQTxFK0TPfN5S6EZIm1ukIa8l9gtWwEeLDuxsmXFQq6HfS

WSda+ncx4Jrp2nmMJWaVMKmfT9e+GtOw+WZujcB1Yr
mFI2KTLWZeulVn8XHjjiPrEGfoxTeQ04qcHi92BCER9hYr8tlAabKmDg3463n5TkOWFqtNkw+WNfQ/pl

PfA3XmCGEmoJl2244tBIdkmtKIiy4nPtpWXyWhTpyAcUXJ8e7r0ZqTI1fcpCZ0kln71M4KIpfu26qjA0

6kGMf49B3eFj8DIA6PWM/53TR9PtbA0k93JWKUfu6k
M0VX3DjOaQXGFdPhN1FPNYdIYWc1S2OiGgbF7rnKc6L/7m+Freeman/sIF3PYchPLfyK5r04MAv9iX0s1TYn

fmVRpK/w323Cu5sckCn0Uq7CIiLlh7LgwGVvkSR68qPjquq+3P2y3cqF4Vefa2vg62lNUYcc0QiDKSMT

L54RHU0ZoJzx33ZNiL+/YODG4WZacmf+mG6g30/XM8
ajcTS3uYk/5EuGp6XjMuBmhkrlFqg9ozwtoko5p+ihiSyHs3LqX5sgGYCpXXi+W3nT1DVGVclt9sP+Xr

ZSPJNlJcUMTeSU1N8wN+u13n3rgBYtSnxQzyLQUkKOl/wGxN+9WAmuymvaOHVUeC+D632tpz7+aOgFWb

yVEyMBsVRWpJTsSfJTpzHg2XLZXVcPXOAjuO0WJeDR
N683J/QP6L1fyj2VtQ+mnBp+B0EFBfjIZbeReSJ9nM82bvP+gQOsEY84Afkl2H3hV2bEW2En1+QKnK6d

EoMmlYC8klO9Az3+ts9TQoPsH0lwevzpAMsJd2V5yzthj5etrVbA/6QphsvD8TRhqNTlqKO5SX1JRigW

So9Z5KeGqg5gSQPIDbFKUAijRolG7v82lRIZiBeY4Y
0c8SNMomwd8026NiKj9ahMncXrZFkcXAwRuvtF69yTOIIKCW1+XUHlknccfxkLUnklH5xTRfGitwi016

vv092P8QhlsNAr/DUo0IjA6foGYDBQgOUpMuv+M4S5RfHkJ3cruSLo1xFrD/ylgO5HNhR72pK6nPn3bB

EIkqUcQv37uKjte4GmJkmgEtaPNqREoSFFe/kkTzVA
l/ZzFjy5k4grHfZY2gCcMgNfckZw9ev2/T/eX4lOXlil/uwy3D3smeribg2UK2Mmxico6ALsjAayxOu9

GfIOqveQ9Sj6E9/nkTXpA7dNOIuhqCM8Wju7ztBPb2sPJr5TL4b4RAw8q5sevvcl3/urMiniXUY5C9Bi

bBCiAv08ff61vXxEAUz3ZkK3kqCZmozzekxsZq56KM
g49N/YDGdH73lf2Z33LVZ/0GM0f1OAk0R0KWZ/xk0lsUd+R643MmPv/UT/b/BDsgMuqnVpPII9wPX1B/

2bKduTuweqHXds8zo1zc/oEFqErJIzrF5wB4g1wCaIwUZltW7aj43BoGhF1MiicrwcJvUzFsmjV4/Hdh

Qa8lj/d04mZn8LFPVnscO/RRfFkyhIKOuznUk1u8bQ
XSghUKRAWHG4AAlYbRsMgCjoyhrvhXLUSRnQISRRvmZv/x7JsqSjvF/77p1tnbgY3YQ1l4IiSta6u17W

4bRQ2JLaNsEu/ZkLzNdDvOwNBcqt2h/X8S7SPY4XnOR2KuS+AYEsRwlJBZGU8ctdMQg/hBWC+KEmC/Ok

IPc2T32Z5ltaksy+Qia3Yg7X3pgaR2+uNh2jymyfLc
uVvdqs9MV9vIxGIpDWKZrTh0XbDyoCLSWJvwe8sMhQtaTzcKpmreS8oEqZNHsaq5lTnZUXnRlC+o5wKZ

Zb4MBS3AOxoLzI54c6izCpUj1vW4odfpwKVxD/K8WC9O9086mTO/ZgAAzgigR9hjM6td8pMnopoQcKZ6

5LobrVzbWqPpPG7fU/F7cZEcCO45Rzb5EeNmaEdgjF
T8gb8FaNCh6IEXiC2PyJ7fEAefP8FJSsc+DivjpWKUUzTXpCCPevPxS4+4nMRdfEaKxL/2mJYewLeTQs

zPVjq0eDqR29s/UJ8CMQc5IqBNyvK82hl838UsT23z3oZDDbPtazbuA5w0R2yaCoOpwZOa6s3siZhq27

GD1kscWQ+g1/nnSZlgh0kZoJFBSDF5ETFW3KrjwRh7
JjB1JVd7sHb5q00URo/Wu2q09ifjBIfYDwdIy3faAh2B4+23hh23I3SyE44UV8kXjOP1E2d/4r2BWbMB

VjFUTAJTpS730my4M6A6vD/zf0VI5ORP2f3xDx0HxPqKXXUjaRWvQ3ITFFDieILOYiGyeUQVh5woLl3T

FFwJki5usxYMQ0PDiYGOv/3QKREO6JKY2W7YBNzd9l
bLM0zme9w/8uHL3I4cDLe0U/AUE+sl6XksAF/snur2nkmK60NxJPTYsmQnNE6Yxl5McgdGfl62M8HQM7

Q4K6LQ3y0d4UoaICvurCgCAHsqlRALAiS3j54fFtruUymp9ICXSR2Q2PdLuNNAMe5tdyI8957fu+WLgD

wYFr2ae4oRMVwM1veOhgD6waKJk1AZhjZ+RBOTrDV5
x6MgYIQRORW/JavpWgfLp0TwrhvZn/XwX5hoD+zttm6/vyaXp0rSOvYJ9o+5YKogsizWbpkUVtOLkzCX

pbsdDKcpzMnW1ls8yLj93Vfz8rPjjyuoncZQqmNtFUPsZ99289WQAZs1dNILxMcu88W6m7Fr3xKoCMyD

7tOs6GjSQFOfNKRXkhFyXcRIFKOIVQYfagI7BHH1Qt
F8MGwz80jk4z/mjznEdvtGRPZ5ZtQRNOuMli6osj9/Fa3aWjLYQSkpHzknKARrgRPEGvvnPDpUWWaQSD

g22rfnMIzgyvOuXer6vOoFjjXm9Fj2c2xavfFRdy0lNs8KF4Rs/4ixh6l80AlUwyzRJT/ODbiPxsmnXd

kxjLMCW5D1S/LUP88duMncDmNX2GKgd/xhu5yTnrZ3
PXU01Gvqh/eUKLBFskBQ7OR2IM0yUqtjePrmvsh3jPmtQtWgY3NhNwPhUKGhTWhwxrEXTR4GEgwx9po6

qCBDL/nFNUeppfCAQ+GWXowq+XiSVezKJfjKIh/AeZfS9joN/1xXw9AuzynyBZRMsFihK9VtRn+vVNoV

XL9+k8j+I4+uPK+xjXydoYIL3m4cWXqEWjyJn23RZP
tqmn4e4as/vIqr2oQsWZIcA21uJH1L61EaTTiQYSm81tEiGbXxY3i8uMycb5eysbbqW+75cd+ncYCVaN

B782sQrLgxYsqEsCjJFv5q1a8/YrWSEShVfEj5/986LpCK9WxYplMDlNjb5yQ+COX6QKr1lHVNz/K515

nJ3RxIdp0nL4bcyLIpK2aItSsnarkJNsg5CdiP5uNq
UBFH9KbUW1YsPrCtaIUUrzcXCS/N6QYsrfWA8+CV4bTXDJzWoAG1Ov/lPNWkRa71imr9hhhOwHHnlMA4

sI3sKm7UpSm9TiJGPX5qQ+EIVON4FWLfNU+X02gUQ9E9kWSNOeRBBr+94grv0iEfE94oqAypSMoWOjHN

4d0YZQuITo8eXPn4bEH71OC486WWr1YhoDv2vAf4Fy
3t3iwr4IIyrKSDtVn/7ROLPHrmmuiDWaMWDen9BknDlvsLQgp8UNH0ncHq/7By0S2awvXENwlAf94a7e

t/z2q8oqX/JIg96ANsZHbnhtykDuneDkFZELGi3zwSvjp6GyXPXelatlZRIRlL8eA4uDOl2G6PV0xPfB

f71yTnz884GOCLyZ6avy36NHHRODXtyh61dYuhbFz0
Iw6hRiMifJQkgxZfKSLra/4mio8r4NF69g5pcHbApe2Wcz/SVZH6F7dcvSVsEw6YkkJeo7NkIw6t6ang

roV8JqY35+P2PAcCe/f7aatb2W5OyZCZE9jS06S0sXbUAR+RyBkUTWb9gJ47+fuDewede2WbZCqc9bdf

EKzYlhyZ4+BEKfmw9BMx+ElIf8oNtfmXoXPvyf1sZP
P/U00GlXTWFv5pgGE4lV3OZzWVG2d01dPgGfQ4QT3r3CaZT7skQOdkzzdRTpWz//IoOsbHhJIFIe9AxS

n3o6ZJZFlAO7HvEifC/NzmfdypqxYAHCNiLty3hgCiYUrLaw+pv/db2XKgUBPAxKsmzC8hZ6GLfZJks6

2VsiDwFE0klTAag6BHhr15ee98lt9DusVh961eWtK1
WDft/EiEA1WBIOA+Ph9H04gi842F/kWyolz+cIFSuPNK8/sfKP2U1Odq3bXxVJzXj3iJ2vp1t0qtNh6t

wH/0N0SVhkvenWYH2Rkk85IAWHccYjjThQauPqOzGurO7UfEQLqV3Jy/LcAcVfrwoK6O5xTNhOywtyYD

2xkmQoLKo3tSdqiApTQfb8i7UrKcBFtb6QtHeo11Up
b7TSzHr5G/jy1RZL/SvDijDp0Rq5PNrtKFYD8J+vTEodHaenp+jn2Gk3TtxEgqBY0nxEl6c0/pGP0t1F

LNOxo5SYDYbGtGI/cOJ9EdkBFZvrhtzcFQVWa44bba4WtBGmgOK+L/xo5h2rfd0ht8W5F0wd/bmoIvIF

B5oXsyOrW/sMQHvT8dzCAXzDsL0/uiRR6rbNeosr7V
QOiQ5sR42KNK6O2Ttj1sbeZwhUrUOigW0xaoL9JsfVA5j2oPR4/8TVNkyonolKGQOW1vaJ+PkYmWDVjk

MbwIegiXZ/5iwK7xZ2kNdLgrjYPDR4wk9dl+NxSrWHYZrqw+1JIKJ5h8huOOUsh/0QkguPC9BZqMHva+

NbL5a43Sk83oDiWKNYmr1GplbrBbKd88eKZhadWpyL
/tr0SRzgIHVLB3i237Z709YKbhuzBEHgohAYhExvT3f+myrD8BhYDUbga+mFijY0rp/bJ3WqBMpbXXue

qVlL00G70Hc4jWReTBX948NYRpB6ZtKHmFdmYyJXDBgkW/uiA9eO6qVxxvMPH1LvOD+JZqhc1W/Pr5ud

r3oD5RV4WQXC2EikzC6yxbv69rovX8andF5qf3Z8cs
C+1b0BAUs2UPopB0lCHlgyJqUKNVJenF1eKv3cuoRYgJ0bTSa21TtpidoYZ50nWXI6C8WKZLsTnzcr+o

SAitICGOOrvcu2bUU2kyatKnV7fBBaqaIOYlO1jH2UzENyZ+2vvlik2zYMfXiY82AERD/ihYJeMdXzvd

wem8sUufke4UIm43QyLctXvbE2gAXjlhUFUjd+es4L
LwAbIJ0SP7JmGk6exSCfP43PKSnf4/Ek5puvYg2zJ5+75Me2Oi5rofhtywMAHsU9A8jEn8tij3t9THiQ

ClEoY3i7TbM7zZn7DjmwzeNDX5jKHZzO5XRwke0qJ4zhfdX7QzGS8255XMq3fy57Jf2BwhbU+mogrFmh

fSkgHQreqOIqZpp8ZO7BKMYdY2zYrV+PkS/bT+5rPH
yxbqkPsMGUgtWK+ZFMW0Jfnx2H+ypHdltr34L0LaNrIYvNS8PDlr9t79bBc+OlckBHq5aEH9uTtEtoid

eaN7NkWjxg6CoAAlasOBq43UxOonxa6ujD0T5rTO8OWb/m8r9LsmYhkvrXTIKdwM1Po1lFX/x2cVYAND

qYTw8vM64eD3dLfzrSBlJW1iUOJAz9JEUrW9Mu45GJ
a5TdXDx/jTSOC4x9nAfwimXOSS3ho489g67pzhHaoGSvL6WedfsQvvrj+naym0PULYQKyrniDKPHCYSI

eyPdZk4t9FUW10bJeeV6TrjD4MuPrGr4Gm5VULoL8WLSnBtKkpejNx5vU1XoSI0x7zKXSDeUYMRoNk6O

w4cT2/IA7b1SdTMdESD8YEp+5v2RrHNHwhBEzr0rBU
N6KOrqRuMzqsmaIoJE2xzwrku/OOx/2Y9Njk2iC/+A20Wel2U415gT2SWXzVAgy7oBRtds04fNzMfuu2

lw4rAqM13MPtyhGvydr5RgGvPt7WGsHdxO+PwlAP8pSKY464W0W7y9B/2tdQ/Ts/Jkb4oeQ/Mbjm/p2p

d1Lj83NWNnfdvjZ32WC69/rOEy6Wb/bGxuyyruarBw
djty+wVNGDwoAjMsGJYd+uqJ9naLbhgcSbO+LRK57qr3SGviA/1VudnBIA3ul3MJE9/6BWtLElLfY59J

XwafqxSv65L75iRgY/Qs2e94ooaKV6AzGMCglLc5Ff4N8XzLWABDX9NGrEJFt7g8fnNnOx3P4UU6CNzX

I3dFhevCN3/J689jXZ9BTpNPk6CXRv6M9gJB5jr79t
VRpLFMWGr7kyOH13DmnCeQJLLykoMu/CwB9pOEtesR8SyvUodxZ2c6ktHysbosVcc6ukud0K35j97zNU

6Fiv5MDXZQLw0SvgNCNDOgVQMnbxIa8mB7t5AzIyWOnbdnfDQAQchzcMG8uQzfHIzvB6GSF8JTHQLx7q

Wkfy3GKtGJPL2dwiGc7CtM6DQfqx4mEseb1CaF06At
e2bPSHjxqYM9SeHcjI9fXDslpRgWpieX5xOMyI0cefwRUrXfRMIAtannkv8cBc6bb+1W+QSBPdHkzEzI

qRnrup1xJAZ90l6dRDSrbtMQM7eJrkMjTqZ9BWzXXW0V3wuEyVtAdCsasChfOwjzICNdhaB4Uxzlptnr

zvKmfxsiLYukolIXGMM0Cgk8q3698wvdNlXg5K12D5
oIzANCkQGf2LOaOQNHv4sEhmsy7dSItJQ6j8Xhxz/SjdkSOoEAbr2OlO2eeem+yZ/Vq3QNKnBUaTwabl

AKSuQWnB0d/v4xvPaDtL+o+3izt9IIG6MRQvvJUDetQIY+1YbMCXb0CCgNMaYN6lz/i2698xMHnKO7hu

wIG0OGzSDtb6IeA97QCNgcP1dBZLWmgqirDwtemItN
QtCjdq67hHu7GnG/m33nO7zRZoYPM0Xy5XJfdgcfoPQzMlgPp8591VL+e/dQwh7KmYf7CxjDm9hHJAxA

S6aFXnEWmndOOGQD16o3LeJRMsjyIY50QfoQiukTgBuMw/6YxAvui9sdB3Zqzp6p99AmPFvmcLF8Ojrb

tYQYo6Md+Gne9hhyTJ5/YdJqdEOgtSCcRWnhqKVVV0
sPmYCImFrp4VSGwJk21DocyIJ2ObxOv1Qn4GZ+UZCQjV39z9a/lrrsaTQGBnm5CdrcKssWX5HA3HyObA

PklK+poQbSgcyI+YzCPtwVFY/WWY5tDIOtzfGF6pLkfjNWEPISwJYbip7Q+lOVifNgCziaE4SD9+wlsx

huDZTSJRb0WNFLwBxdaJA8OZmiIGOA6KHU7l+JVIw7
Wxs80/wFR/BXBLcgdhDIHkSM0euO5qZPDmBxYmXv1r55r0gZ5TKy8JJpxUS4W06QYK1sfIehVRT4zTau

gH57veD/6pHIawWN329mNzVDdE/Q7Gb+TIrJCVzOlFe3zysS2f9aCfhGzVB/YUS8Y99l4Y2rPWEpTF/k

So2xnYrGB3+0dPZcVxqAig7fAfwwIDr4c9Ew/Y3Bd4
TKVYLwadqOG1XLQ+40+rgFQ1BNt511h8wf6hkCrFS/+ICk5Xb43TUXV+tXBrVPWLCDeyyTkCm//8P7se

cw3/zn6uLwiwr+JvcOoOp8kaVFW5WXIcHEeg4k1HWu7zinG5PtfrJjBX8TAOfg/lWt7RY1olgYP8erMI

PysF/WZeT7PjqaKndcylIzTK6wT6C6vIbxxq8v9CEq
lm7gJcQzuz874dGpHkDyB/o5DoMmr+8VLPMvqQq0/BpJ+gr2E5t1+HetBkO3bkGiPzw2joO7uPpzLzNf

KyA1ZQVSfQsZi/hEtfGnU4yzSUwQhA10Lc+zVRFsSKiUGypNYDXevIdh8BgWqpwdjJMOWPOeyJSzVnbb

YPcoYw7E42cVTpvUDQ+C2378Obx3CcPi/nSya7wR8A
z5MpZOa4hhGguwWQ/i+c1HKmPV/nwxhTokXIHu3C30dCCyte7WMT0NbESJUAu/SAVePPvoAHa9zbaVMY

w0mHx1qcHWTTxqhr8brxyseHaGSzexOEvLOqjgn3qvhUNgTIHDZZo7s52nq3bvGh4fxd+a0JpigF1QN3

s9IbDX1tLpN0DyXt1Rx96lpwj4YmcNagoS8/vv34jb
kQ/L47CAxBOBwrp+BIsTLLf8aJqBxbqoG0ZUpu1MUyv45kwHmjjtNPRUZWjypk6F5zIczkjNhMfdDlFw

a2ALQGtzuUZIaNMHGkusZWMY6cZb0cGbAHB7Fg91cwzxnMv4qAcR0ZoQQ/5f3gFX/qeZ1NBcKySv54b9

/09P6tQ7t9doLoWYq1yvoga6nVyLt2SsWkgXv2TlY2
F8WIW1lhBXhCmLp9LFD+kUmeWfm4Vo5HZrqQM1Fuc8wz4Isc3lmqQqUxZucp15Ql0URm51F0Nw7dCUAa

wCTg6GI94gSRBc49qG8ewPH6Ogzux4HPFE88vmmf6y4oO1UaJOjh4F0D21nvORYR0CyqDYjQRw0767Ba

3mownD4AvNn1iO1t8W9dpjP9wKH9/2VJT5fLCQFBg1
A2TDBf7SB3IR91BplPVQwCMAJ7RqZdCXA5ziQJ+o5cLEyQ6C9l/XgPTXABTwGIGOP7lp31qBNV9cveMp

U1qfB0xn08wkmKrt0h2eMnumw+zjzj2iOp1mhtlBFY6sbcwLuVj37ODc35yB5mT6+RmCKQjJ3u4HPBpy

wk1hlBfgADiG2tSnF/d36gyqURQSDn2h4gCG7ner2G
A+0IPyNVqFfRQISWhkcui4vZ3/xIuvnmRikG76G6rw8GFb/S4Z2gmkiGU2AMg10gEgZZF9VrK9ohZDhM

ceZwHqDeA5m94eKnxjrHRSrqs0Juut/EQCJq6M+jsRX2V8vuSu/OttX1cBqoDe1U1bBD6kq3P9EhUP8E

fEVh4f44E3BIIa1Zt1uw+H9zYRmCxabc/OSzsWgUhg
AEcqbWPe9uWHAFPQW3TfX5eoIgYq6ALB1CBMFSPBRJIpleyVGTsh55vNxgsqu55lR07sOMHga3X57PYq

xYfwWFvvAAz+2qKAqGXjwC32cbJ3gD08mbBZKQ9g56SWObjia4Twh1JgaRsD+byBUVSGaiR8HJdemq6W

tr5G+VPf++QnJHycdLraoTNTNu66VeiLCUiSQRBU1D
YcLZpXR+zYVDLhR51vKxXLQrIR1jrdvDNQSLFmCE6yn0xpJ5QGe8giPxmkEGNBO0vjTU1HoxyRd/aqOh

LWo52rFfR/TPUaUSBwl9LqErLkq5fwtqC94PDgrPd9N1Fklt25ilKWtBwNwrUgDLalaP5s72gv8QPwRK

zCRF9CmMXP87TgkxGDP3s08DbN1EEeDABJ6+3yRSjI
aw7BT72SP+saFv/DLYY5JT+qCLz9aw6GX+zfxq+o4D2v1X/6hJMf65ibuPM6PnWGD2Gm933xJB3F3ykn

/bYcMc0U36HnKYZHnWZA6R0INdsPT3eIPkabPaOmNh+1OqOmO8YOlMKImJKQzu6PwfKYo9LiysLC8clv

Ei7kAmZrEYQLtloTIt71a0ZIB9ZP2ovtZnHpT8oHMq
cyN7/sbQe0bLSp692hKi56L//hDA6I5BvYdqnjWhiGGOQb3mF+nEoXKdb9l2JMCqAvhDKBlRwAv9UV0j

d6kYn5rtycU1stcSIZCTzuLmzOfWM+e3PiVpjAeqfetGmah3c6d7eagY+OMiu/7v3/LCHf6Cyos9qj9Y

kL99ukTexD4Vziq+zmcshSZAr0Jh74DmkZQD/Kgba5
be0YvMRSNZSNK7d+Dc8n9ymOLsZi3laKzjB7qewvXQ+Q/3CWEjzndLzvKVeXKXzPPtldgHJGAMEKmTq4

sBeB1JUED/3tHdxknOk2GcS6o9pEEesT/oCVDpWhVGLib8YSMExm48M0oPo7uTViwuo6svHWwSZZjrS0

4cHyLHTB9P2xTm+msv1M1e+5VIWHNC5TcFgeZ2CZED
8/PeOBZ1LQDz3R2Liv9a/skRkybIKE1fBCMIVj1ztUBuI7ZxZyxpe0CR3YY6lbs9ivtMqfMAyv2Ym+wD

RXKc/1oGQLoRgPZUXwnKR7zOc56b0f0Xc5on82aFHqzhRD/UMnr1IbsjvPkyRcfuDUsiIKb4/h+kIvPy

CqcT2c9m9MCFkDV2ino6tjuRJRz4rj6aKJpwEBd5FO
MHHLSeRV7BVM+W4HWpJKvrh/a4ZVQtBChuZ4c77z/J45WjAUbhKHuD29vozsvphlWuvSxHoTV4qLFwo5

Lij4+ODOhAaf7IQDiMiYOUIWbTVJxCNV58yMbNRre/BBHnSgCLzHcPDj7gdtsxpxypmE/cwNU0747aO5

zRdc9JFd/0fxNyqqt27y3unp2PqiLGcUHMeE0wGCfF
But9OCRHv8K2MxgEJ6axvuDExn/EgRPkDBTIzoGKRQmUsuJQX01ngFRKQxxRh5O9GX01//fk9VgNBsRT

oRErJ+WMaxwcipT+gliHbtW1BIqph+pCaH+prcOvMhZqd3DTtYuanXRIJ0Pz2ewvBbOm/IrfRbc73dsa

qsuCcBK6HYYoo+n1vx7V5vIxN+8tPU2q2cePtl5Fs7
tw2lWIu3ekFgZRaGwY+iZ87D9U6yxdewJv1vHpfcysIQXZFXuZ0PzVxPNxBTHsMHtJpo7q//8RLbZcmt

nFb1WPVmavtswUBS+d8tXvIwlF2M7/Wt4s6P1ZGXYHcw37HR1JtcaSZEx/5MsM9vTaxqr537H2vUq7xV

V4f0ZGKVXp3NTTjlNg0YRb5BTEMSzK6imr4TBTl6Bp
ZMVggh2kvLks3n2NZuJoNLFye9Ta9gDBep4o2daH7i5Mg22k443lZUqdxYTmRk2e70LtWsFhj4mk/i5z

a27aVQkSaOYxSfKQib3r3jDh7nSfDS7U7ODu4N2eh4LIo2TXvwQgjZF4v2bgllnZfaf/qlfmwlikj9W/

bcl5su66H7YzHXOSyrS8G9o5aFfTr/Vtt8UoF1lG5r
jfu/8vjNf/HiNSc0KLQXp9VovmiMg79BQWY9pIPdX0qGXFKCB5az0mb4HEiRHyPyhAUTR2ZHNz5kyDHi

2NCNPptwPknwwI2JRq8GWweG1Y97g9wBEUbraxaEoTMy9Qi0QsQWKE4FojpqrUx/Oy0o4oZawYmM4smT

tB6RtdsvbNwW4IV/SVZwX/hLg0NfPQkdHIjeARcwAW
XdsjXOMUnAvMxNOF8RVvLQlh0XvzVLIdgtbYySi5wrKuW8aJEAGtD8p62CNju2KOHgADVnG1tkIlF7V6

bXrY+dBpfIu0BPn3pZ9WpYB4ny0G4xGPowg3w6NKylFzIyQT0KgRKbUcRmLkzPn6waz82NrqNIok6hyu

6nGi1PD/mZCyi7+sdUVOIawTcBsG1KITcuFyzP0JMr
eFQGIabW8N3qFsp4O22XdR82c9fCqyvkAoOrdYvvjQo70Sy2z4laa0sn8Ld1TTnyLCienasuenObJTQ6

AfxOUyG+ExIDOyId8trs5am90vVwSXoD1qG81qN5SrEjoYpTJ/D3wuQRli1bfuIcT8nZqXzDz7qBCi4j

MH5MrOASpfS08GElt0n+WgQvjUrfQfpr3EgocePoWW
+3K2IqG87+S1o/qLNMg8QmeRFYki68rmjKLzI5BWrfflLWR0/ceA+iPk3S25/FkebxkAyMnyxqzGH5SM

zS12Qf8/66IFHPyQYMsoVSTbyctdbnRBM7JgghJjin0tk+JjWUfZnKuEAmyV1582hk5aV/TjAEbwZPZk

HtdRfnsCSwMJlT3kVVkedrpujh7la1GxTLoQtTtZSf
XXI8QOvXB3ddXHYN7d13x7KtB/9WJJ6XInljdP/GqBke7WE8BSJL2Gnjn//nkYrWKT2nteS1H+b4smfG

q8QQhY5puxZHb9FECKOyBuVLUBGZcC7XVaGuv0446Y43LscrKziUqlQ8BEW4ias6CiLeAhHtfyXX3CKz

2Qt0YIr0Mf/GqU9LRegh2D4XvQd7r9vw5LwNA3bCq9
c6RxE9OF59/NLQvZNVzERZYYT8D16WJNEDRJrXrv3kIy3IrN66oUt4IRpiiw9yfqt9Xa4aOX7vFh13Qk

mhGNiIUMOTB8rze1WUPNO8kuH0TwnlWgd9Ae8JeAJN259UtV8qOhQ05hRe0z4JK0cj8cJKg1FVAjeErY

5Q0dVCPTjASX7QFFdwjpP0gjQi8DRR/jh2cwoPQpj0
IWWZzEN3O6w5cWFwQeANxFLSuCxzAIAg6fYtpx3juq5rSDW7PVeaXTBKEQSaY6f28cz7++7O7J/93swc

QuOa8sJRaS3qFFwtD5d/C6poIjEKSXrxagBc2Czxipizlm8251zhvbbj9jXY8XkEcJmhAj+DRQ0meCBU

Z72WiDonRQq1bI4KCJyqCsNGZ+CpeAhLzWnsABHm/j
6SMhw4Kc0+ZJ8AkkB9dNGkrrdsLm/np8bjOYdSWuScQtomNlcGsTv22uxqVVn2g5H7zPv9vFC4KprDXN

MtZV1IobFtPnKaX5fy8wMNsOJNJf1X8a6YW/u9loswogMahDorhXWyv6U1UyS6hM39ceap/OUBeksz+b

MdrLt/e4k5I+2hk6BgJGTL3LLprYdin0ssjc9XSa06
HRCgHajwpo8XtO3cXo3ldit4o1qvkilql9d3JxJLzMLqviPigwnEHuoZ3PwKRa1QZtkhundwfY99NjCl

h/YPx3fjaKmPZlgbSbApUmxXJmR4YlgkdQzv/cavNwJf5Wzr+XR7K8030UZQkK243J9HjhpMZdVvH0l5

GBdpPMwMuXtiltFNo7OhLvRnIJaO+/B/KVgyGqAdRM
mSdDNpYz9oUrlCaMl0F0m4iJZPFR0yfNoZcXRPL5tAp47NXIPlVnuuDe6IREFXi2CWlNn+GEtCtDIlv5

U3/Xsq/Z8f7/kxh8ENn7nuil6ucxycersQHhX8VckGNP2YT9dmSiZot0aM6KpPj1+jXqWw+PgKcFg2/e

TcwBOp1qDpvABGt04jNsHmB+n2/ye30dMstO+i/Wdf
JeGESqwVXEponidPSCdJaOR0ivclvGQw/Mx8Ay/CC7fw6Dk1r1NZY37+8WYSl3z2x47mL62gr3COPZ9M

pSr+h/5wHXrUDyiE3eSqA6tE0wd9CTNxB9L/MSxT596YEtLqOMwFRMXB6ASTSpyLdE+dexMSYHSf3VSw

XcGOHDCxvPrvrH+Bn7qwPVGZzh91WRB9w9q4SHIWVf
PNk41QWhpNPNfhsbwqyHQ2994Hc6s5E00TzcZabbL7MIhp+K3IA+J8MBOzqXzv8t6fedcVVhe9d+PGJW

JoK7Ho38WjVs7gkf7iwQIi/bHLOJeyXAy9funpMbffieo9JWYfk3FtHoyufwoz15LdiadKZk/PAeU9gK

vmKVNjfSp9HLLL4JQ29UJGiQRwAR7kqw17xUL1bAZx
mU6dwe0lBgptPJkErWPZKbmIHW34NF6UGDdBbiIZ7NJGL5szN+XvBqyLpKhRdj2aamdVoPyJ4IPoEtBY

silffz+eHEscK4ioF+1rSaxs4sotBZqIGkLVEr1FkA4l8PYR1uBi9g1hrqiLWoKWUPexjKXxmoVUuK/l

Dd8U5szwOrs64jf0j6wVHp1UAGIeW+4MXGLNY+jG0A
LaojiI4PXhv44NmidOPuAHY0MSz1E+OxI+EkkbzOVtYHjtulATPYhOrH9XTlq7S093CLgFJ2OsvaITf/

vMlfDNnO/Gp5tx3tz9Bc0bAAkdMe6NyZk4amvtY+/SgSe4cEObeEsX7DpbQERhrW8+fl+7d6mF/vUE1K

fpKcEd9t1Ysv1kYgwEBq70r8AtIU0XsZ/lLVF5naVc
3irYR3Qhg4NGVMKpayGHtkh0MTAffD3ZxSkzgRWqaG2V4b883eBMpogG4bSZuJpDiXQ5WqJPJcy49yb0

Rr2VgANtAq2OdLkpjeqcd1U4/y2rvoKkSXtxxd2+q2bXgnFSerSr+rz6lfuCBd2e6tOpEzBpGFIzK0q7

7h6IuipYkhMWGFIRgS6qjwnPNHPBfpJSO3dssm4WZJ
+sAMP46eKQDn+KCZeVQayNUi+r8dtFGAlvfLWbGtIcy42w6X6BWaMTfE+Q+TGTPeB83IBgZ3rFren21e

avepE5GW9fDZh1GeI4jHcmyVUuwBZl0SbFfYFELEOp3JMPShYF/5mxPYyfvGpN6WMDhRPk5Z/2VKoPby

wIVffaWMAmSDE71tV6B8kyAia7Bmdqa9b5G//Rtgdk
m7Kit7wT0EM/N0xDZ+DG9+OiFXF1McrFwDqbBCiRTaAZuZWCrblcg6PRpWFTLOifGj2vqSO1klS7hTUN

W1O3EFtRsNTeGe7UY7zNR8l5nspMEmG07hAIqEw7tcSKrictI23JvN00iyrLJq4CDjONIjw/fO3WcDFK

XDbs+8QyFnBFmnhPCYJYWtt8j1lVXkZbjy+T6kLtgJ
PTzM0mSwmzvAuh951szqyjmXAghf32Ncs6yp+YI5++aM6JMVM85M9Y/VdJ43/j/1i26I5iQw6wWoQh+6

+epLQIMibF00tL47vwGtkoBRtw9onfOS1Yt/rTLOGogjIQp3mjYhYorEIxz1/3Wa8ewBezBFjW9Gndc1

4Ck2zAU1+sp1eljXHw7ctw/rBoTMyyvFBT77saoEcZ
JXuRpI2si0o/OgjOoFdD5xhgG4bhrFnQmh+7lyvPcD8fcML3WbVkgcKkuk/QDSygzt1QepCGOgF67VAO

TTtJwH6Nkm6FDt6h53iZ5kan1Sf/k3hG+a+bD/wrzh0Kuvxt+TrMdzz5IjF7Tv6jYkefUaSYm9D/Ana Rosa

MyRnytMFcridM2cljk2mY7lIriNHhA2Z/SnLKVYziN
H/Z22RoeZNtGYXTHUzZSynsXsu764C8hBOKEz1nklvs0rfP9XOZSJKdRizOEJmO7WA9B39l/w5PTdPT/

r93rMcQVhLK6WhTfm7ysjFeBoRpes9XJkLG4drtSlOV9fpCkuW0wpBo0fs22IsQ8PIEKDp8eLkkxYKv4

2MwJUn86h5Qxx0/+lw2G9Y+CVUeEtm82zEwjy2pM8O
aFQuJZWv34OwJUSbMP6Ju2Dx84kOi5dpko+hHbT7Ul1nl4sC3yQwTPriTHGy9SPSspJXzOz2Wkj6dQ0F

ysNAA6QdNdYPZ5Q21PXFK+2SQ/jJxngUcRKenK4p92wJdIn4ipUIsSfr/KMy0PD65zw90mZ4yObx/ciT

D/poe7lV79MSDZWFkyWHBYcEAcjPslwLB0Zt3M8Fn2
wH8Lz7om5cQdqPAPVVWFFWvn4byNs+kSxGNL1UiCyAOVW6fdnK0vAt6MXF9sWnRNLlihoDnqms/jLlYG

fHNSHZvoT+O7yy5dF1GHyjmCWhvP2Y9jd3aYJ8SeXCRZzT176q/wWrjoSHQNgZow5deVQ5uIIQdogy+C

1/dnAu0xO1G7Ka3Bl5HDr+WgIVG3HHtOU1ZxG0BGok
vLWKjXgeVRt/eKJ0kAAJhR13s+1ti+AjJeqMCR9EjxAOUHIKPSSLbNCnVYNUuIy3BgNC+AkR8wAyPruj

HOfWmq5taUTOoBCVI36mxs8BYaDjdvmR26Ep0a7oveDwab5gWvny+9PkbvZuYXILYn29GVpBti1vfCBk

56bS58BSPzkRxysIUalfMWMgkJ+tCvAqQVBdhNeY3Q
fsufNP4qSJuFXWF23tq2n20oKoUREIUgsKIeK2O96n/w+DBR1wF6vEI+uCgi5OeHyVZveLhRExTRqy15

4rHr4F0cjeNCQ39km9TxUqxsc0CNly7aSJEhaiXC5MVrh0Juje3TFktKXU79C3Lt1Uz7ckuUfcqkt539

4DBOitVPp84TighqhZYkGwm2cTgKDc78XX5gbYlAgf
pDsAQ9JwFFjCX3NjKi5H++c13fjs7bhbg0vM3GNEneRJ+WaahQ0sg3R9IHBAWp4+EgyuL6320V8WqrsK

ZxjUzQF4dUJNCHlW1z3mZhFKMf7zdCuezn/CuRkU+A203eyIZTC4I+r9Nl12ZxsDL057HQWJ6054YQOs

J96evkfgUO1uag4OHqwT/GbEhM/9fIH6apZzZqU12k
5KpZ5CNieg7/MtbjCZNQ3yAMUQWAeF7ea+nwyqk42t3XtLywsNleJUMwLumMqpfN76Dg7D202JBZDtUU

b8zfizchT87FJTNmph4SceZ0cun+IaCLj56uvBGtuwagmPv84xQSpE7jz8x7/inyLkf8tAbvAG5Ebb5S

xg9p5VS+/d51j+m7jpKRvVkXea6qpk6bJYxNfBdCOL
ECxGBSOfyDxdtG+gCr0uyFtBpRCbe+FgF1yioW+VcOGapC5qMSba7P01VXdv0IZHbHUBScXuKiDEarRt

DEGUX8eaknUOGxHfeOzGttk/nDHR5CaFiUZ2KF6h9ryiT8WrzFfoJef5IYyorcg/in98QS2p4YLWduLx

zmYTB0wa/oWPqumzUYbsHAthvL/tV5mLItB9pNv3Rz
/4n9pMBbwD904MCMswmgqs2OvlZ+vMQ1mao4RRBWXoEn4EQGbEa+dO4B3DvIIfPUCRIO0ax4R93wP/tF

6ywM1oZ4dndd3Umak7/vz7ra3p1u9/GXpJPgooaDlEdGC+sFYpWM4sbma2xCMYY5ORMCVacGstqaDptk

0ZbULjdcbk9P/3Mg/YPPmbPA98lHk8Jn3T+gc71C+y
Ta9QhIXc0YLEcEZjkwgoUn7gk7Ox1Fms307m7+KvstkUoEgnCuFGijURK8VUHkHWfE+CotVTqlKIq0ea

tj/tcl77eP4BA4X3VqMDcOJZjkSLF/czRzu4tayckLAY+yIsAQWAciUe3ueBH2mxQ29rht3lG0IP0DLq

e4h+j0s0Mkmh2heEiyr0JsepdZRrUdQHW7BcEz6DYl
y0vBwALLIISFaWei2c/X/fZG1MkSOZ+EOXmkhbmYcZa17V3qyT4qd+alyzA54O4RdEBypzWWghzanlNl

p0BHTTZbG05RJcvkwoysati9lAbOmWf1luBE9ZRX98J6CKJDPisYizSUVWHWDWeol2r3b9TRdhl6wMuD

UBmAR27nYkYk05IsKjSy/w7j0c8SCcuCJDDnODwuBx
Saj42EAWnBxGNPzwuDOqEWKlUYucFsk9JIfRJ10hp9AANaQHF7qSBvw1OjuWgHZMUJRDT142fVSF7jWD

+dOSsujF3gQfydxFkppw0VNg1Z+HMGR/of3q9EW+oSbxRxuC8Ufg59o+1cIB6Q0f+YAPGiOo3z8aQ2U9

6OgXPM/lYO/GDAAUWBwzRIiK/9m95A17HQ/sYRWsNf
r+4XdNyrpwMrbpR7gOfQsV9WnSdFOTfdz5PtC12EwGHpu24VZkkmBcDPzftmy9beHjuRNsW3SbKJy0E2

qQz5mRPGfRnhSh1uK61+vfKk+I4MlNk63un20HbXCzAElNXl5cm7G9IIjAHrEl7xtR0fZYP4JChQvL/V

CciYBUnLub0YJF1LoVVHm1kfTfp8viXcG0yx6/ZOVF
JxUhgL5nuVkSHRe3QxN++A8DP4RhW9uogndhrhJjFxFBvCqJGTL2/3YHf5yTx4Kjd/yVnpTrmub4HDrR

TejujUTL+kFICxfjd3D83l+3Tz816f1Hf3Bk5oyEsx/HDtU+RCK3n43IAzY91oAoD2vOi4nrH4cU9dM2

1Pb77ZKf/JMU3NSJOXdqt+fDJg7Zu3En63fv/QOAg4
nTOwPUL6qpVHck+oH+QE974C8r5fi1mAY5j1ADBsSe61weOYDMGd9MNEVyauwPPXX+iRa8TJ/h9B1HWu

UkZcL1Uw4lXRGzCNb6jWfjBYEIW4C+ffOmplpIA6rzUsoNqagovtUyysdkdPB2beXUobpyknmMJtatL1

PygrcVGHSfaw6yCQlXhLfRVZgoTivOs0Fom00nBcFR
4Dhw1Yb/pobmNmuxdATpV/Q1HcfvPaQucjbgZDw8HXubJVMKk2b2FYnOGQlag+UEqNgx2VA4W6Ho3J9t

uPYITaNUG3W57/DLsq/UyCZbTGpQFefyUWj8PpMYzYGJmz3sYEomWvL9qYpIZGEw2NquGY85nsfCB95G

jLEXYNe/MyeKwjtvuVLZlVZeKw8ONPh9ETaaPrhaRJ
QkZYF2ZuRTdP4OSF8He/yTGhzOX136oWUirqbP5+GOHW2AHGT3zGTrsMuJBi6ZqjW0RvzLyO+U2rnHr7

wPUgasZs6n6ODFMJRt0++Jcn7QUrBfBYKTxWKyrr3YfH6kMGoEY8uhFbo930U4iAMMluprpo5gGX4VVf

6yMcQkThjrZfmfchXRbcouwwU0BnPjJO1Kzcc7UYFR
GfAGdt9WZxL6JsBJn3SIviO/w1SIm0KtxlCsi7nRSj+VRd2tJtLo05z4C7GKxQe9IE3/Kq3wKFzJ6SyL

ZERHKJ+27MUR+n0r7Q1O2MGI6cY6XMF7PzzbEUp4l12OOd6/gB5UXSnzPmvbu4AeNnOWgI039Qi0dq8X

5tWR7kypozLmSgBhFXMTUhHY66ZrpIOLtuFQcH1N/j
XBN/5bn8O0CehehwVSXiDbxb1vXX1ylo/YfIUOKJLO4fiOuTf60g8tfDnEOSkgjR4fcpu1F5yX3JGTp3

BIQe8rvZ6Do3Ptr+QSzzp84GwPBexZO4rc+aexhFVbYyTiaLvS9eI+QLIcGsAt9QG4zrazjAGTqem3yb

hlwnfpibVOw3i9awpg2JcA3NRyyGf/xfx+Ud0Jyxux
Q2nIOmdLh89cBX1QvZuOpkL442UGX00Ga9MziEoTJA+OPHzyS4L+oc6T7TSqq//Yqo3LVS2LZDjB4019

PzB+iJuDLWiv9H4eaVB1DNa+PBwqRvZ10e6lhwR2yPZOyHg9H0gBl/2mj1kY2U+POSQr5cdPWC/c0+fj

4DtOEl1hzDYRHalPwvARdzkTtCyqB/jowzel1LdU3p
H8lRmK2d7K/aJsWbh6OJEDzohy34UKvp8z3EpOawQK9kX5uNEXltqmCjBH7jNrInbJxrzBxzjc/lw1qb

n70FA0ZF9ZYmbCERyVVpNSV+jkgzC+PocBzzLBkZU7Gv/VIpph1qucg0BU3Qb/4Vpkc7JbmnAlNSkpRE

i31lV06MykO5FXjiXFEYnjCYZLMnjNlbW8mg9R4+51
K3RPm+A0Zc9TVmAGhxNVw0kzJpP0M1gz3gxPv0omB/OGfFDHE7VA7ThXXBQg5PhBP/FQ+SrkRrgbA/2e

nOyMASJ4wmid18kHTgvx2B/Ya0JUuFqS8uMpseweXorEDQZLththvCJMD0/KLNJMavC9B9e65q1jD2D6

Vfb7tNX/3AWwRO59f26BwyH66l3uNnUUy/SX4BNkn1
8c+G/bAnZDQR08gT5xTLY4pMOf0epkHgN4DjIpQSsmkBr26BEb8oA2Jbq1AXlFqxWlubeenFg/ToN2lC

0pbtANmDndd9Q6a97i6e0VFe11IR828GAbPbnAbNK8tmvWvZK5eJvwypBr+JYoz8EmKW//2fv/cfV4Il

BAar7sWtQmY8N1repifJ4kvLjsuFkHsHIYm0BRorvR
Qafj8hQbFCTSLMYshgcf4VqV4Y5z7WW1pu5CbqJ7E3+N9oDadNYToBrbyMFuUFzg7p+NpH6SD7pF9mJJ

JVEuhsyS1qQnO+j6L2/g+5KxXR1Sxj+rhDVuSgGmPjhzDPY3/hkkVCE/LJUQVth7TCP+rJD9qWgv2d6z

lNYiLoxLWOgGZpvucj2EqRMzfOVE9t1b1fcemE5wUK
x9hx5+nJ3CZgETvq/ElXt7H2jIJOIjf65zh00mkXl/rJ0n2ql4kxhenfCdBCnsjrn7+PhakZknuhx3q2

AcsV/2giALK+XHgB7LMrtKZJvj6noCoeAbA4Hds4V21LnUXGWpw3BvdFpWXjn7EAYWzP44uRIhxvDcC0

apThCMt/Y+dbVpRXchUn71a3pTE8RCeupXrUWE08dN
KMoKMMVd9TAktOd5LGHaQqTxWrVsOL36C233jGgpdqZWRTBBXUS+4f7vSVYegcIGtIo0iChqOq92it/7

gTYVe0janYjMzLrCcGLjR6Apo8i8yq0GoK57g+n9xGesDjGW8pOjdgeBkCUB2TYwkG52NLbWGVbWTO59

d8Qb4mNyfrbu+Ebr202XQIeqKPnFP6E9pe49e/aK3K
47hQOZib8Vin7SgsQN4/w+7thTRT9xxTwt/GlTWKrUrkl5Uk9fIIb+D3xBlQrZIaPa60B+aZONJHxzp/

Qp3j0US8kbzZltDLMdnF9+jZNPVnUn3AKci91RVJfRVV1xIJzxGhj0ubgk/xnFkncfsFXl23gFsUXXnG

2N3/Vwz15LDVSCenYLxYLZDOAWl/JIBBP155u6wmC6
XpU/5nOdjpVeLO5YsTYIatsBjMT5WCbUJ6jk2wydDwiIY71M4pTQpUKblSZnaBUErT4+5IZB+7vT6I8Y

GRa2TxLQSosd53ZBAMdHE4PL2WkQlLy1LDvjfUcfzerU+VacX6MNb4l9uGwZWG913Z53qcFvhFCnJJ0g

parl/kv5532hIzL2RCKy+dXilMWxfSrO+6IHHrGLBm
3p+7Dk+/9xYM3xKsJcyj4I+5UTLQVKrbJEp5jXkuwqY3Sr3Y6TNnsk5En069TTRu2U++rVoKK/eFuxAY

CLd0vkOt+QdaxIP/85v2QUUepDCTICxE5PB2S/VJPOxGrI9Les8D02YTaiAJGd2rcN56NA/7eNJdrbYF

L3XzT1wNnHl7vZmqhgZ1VmVb4VxtpH+FY2L2pOLEyR
MIR1oLSq0w4DYRnj+gGgFyMdH/cefcdzRTh0Bc/HNlu13BEQ2AJ8yYUfg81HPEv4wGK6bPvo0xcSK2uB

LI0GmDXqqYPFCfuSzCcQQ2j9bCdxZV76q91rofBTS3YXoF/GyKs9MMWaGdJG0HxEPAEmJwa4t24OBfc+

k60Mg0e2iPX2piDBsICRoBZt3yIUVfJdysqbrCaJLQ
0dRVKWHH1kpXCfhrrAUZLw1PZKbNn2T7hPn5TUQMKVO/pQTSVehFgNmmfyq3rMx8Xla+GcdZWU/+plN1

cbiTfUXjigNCY4E9b2G+8OtTjmBNgvqweiQ8eeko/CIqJTX41iZk/3aai/JIfIAUPKbW+o7jnmx8Cq9E

p+JbYcQrxXeaYAkIljRlWyCmQkTds9VuUpdMxrwOLr
0w6aJtXcp+YOwvHIOyXIUJ5zk5GdkSavMWs4ahmnmYF4CCJTw05Yq3xGP7/XjkxiFQA+UMcHwDO/UE9/

9satpdI9B3VM5FfPec9jdJLhh+4vM5utmU5XPss5JnRXjEL9qwOsfbw0Pv8nY/U314B4FjN18sWTtEnp

S+iXcYstotT0NnArdViz4ClFHXsIWPvsbkK34oSMS/
pMU2BsFYInLrMPZMqQoXjYbn1AEuwUyMWd5zfaSFPcVIqFj2CsG2hjLuAqJJvaE4LXHZbBtkgV9ZLfsY

USj2VzVm9ZtUX4cweaIP/1TnygqEFbdANJiYYqDKsXqzHCY3134CjF/G9SiInJtGtOou53mw8pFvjxGG

EiCVy70/MX78J+q38+Zo1sbB1sQWzwDUizarN1HIg/
vdbrZ2Wb1EbGJqqUJPs8Q5FqN130dNG7UwD6ddOAWe0xte9yqNfEDddhzBX1ocl9jK1IBAj+SsErasbm

r7hipRbxa+x456VU124G82sZee5mT+knOvfRfWYXJkhTZjmRD9Q5nQh6+j/bTRg9w6y9MXvbb5lhxOku

G6fPK5pnwiWCynu8B1NO793XnCKGduyy2igpbfIE1e
hjhQpurCfv6iUrlB3IHA07JGpEU09hG+CTcQ4lzDoWqn3AlhoE1inMASE3uiR05o2nb0NfejDufZKEbx

xQ+4xPWpbElmpc6KLaEhYJmwu2z8IfT0y/ImzHpZ967HAUhJ3ujChygWRO/KKbHN5h9UdtApjkP/76Oh

jYpD8tdV4WKgn9BcdQguVvot5TgXTp/dOpFxq6a0+w
M9ThUa/368yX9xl6cMN3jLpfPsT3qnwC+Gm+QHj2jzmDmUyM5Bra5/XpD8re2Xifo/ymkF/YauO8e5LI

eWZjEzNtAfYTmx9RvSeoqh61Wt+bQF0Ac122a3j5m5lIRwzp57yIMb46CT8kZmad4+QCmXMTbJh6YQVn

nWHS7XARzu6kyRXwwQx5GNE9Rc39To+p8LOcfbfe1+
X0tqW/jvy6WBVDazSyvw7nMK5mGtU7D2JQTW9Xod/jWLqJ+8sB2RfIg+bCJpnKEd1ataaExde33NQyoM

1fSsJMPWi8f2+aUUYo21yM7WOTBpuM16yxwQLPBX5q9H899jRnNXDZ1QeBTgU3mGxATy35IoSmUaDVol

iuaN4C8bBw4B0S6FgNPqIyLPtw9moeVgmWl2w/aF+R
vKeWzTXsEXh44K+wMZkUb6f3ioXNitnRLIeL3ps+3MhXD5UK/ph5NqgWDspZ9Kci01N/WRNu7xNGFKfz

WivYbCLLbLyZ1yVKMAc+QROnzxXCyT3SuFL80xhj9Tx8m2RvHM4smV3ZYzec9aytHU1y67d1Cc6V4Ia+

CTa+1omR+bIh9aWBzmrsvxojwpQGgT2sGXf6+Z55Id
MQQAcoNTo7UheW9rUIW/21/jtg8pkedtqjSvkp/KMe3aY/blnVYGtoJXOPhLN6gs6DiDEFiwrHP+NZBI

5Vbf96vhEtDhsHfBLcecGBpQ19ploB/0r6Pk1BU/ybUM7M2KnSa6d3MCKBlJIz3tRZQ+XSfXpu2sbtL7

acrv+lNoxE/QV6o0Ci28RChvTQUO3YZwfPUtQXPL4f
Cda63jntOZdLp8w2udv5qvlBEQ6mPnS7KLv3nvdmV3lK1Puf3wspsAFsHH5AWhNR1wbrA0d7zMDodTc1

Y28vGDEUw5BsG/9T5dxhBoAzGFMsyn4REq1PmIfoofG/APUGRKnJkZG9c0dVaH5ZNRt9zfMZSftSokyj

RE9RLhgsSKgZh80GF4A09hDTAkx0375pJhYXclGoB1
V/Bh0tQnejBowfFsTMn2r44tRfmlHhTH7s5cwxcHz+PpevwzvLg95UjWTCICwcsjeWxhJ3a9bJVj6twL

FKb40p/MYKUKF/bg4WTc9OV2ppeUpAdBzqzNd6v5cZwhQQz1n63jr5WusJ5sLPVVYyKdXVo8FvCEhvy3

xYZT2aPRl9uWKX8v0C6A0SvurcKNAhrgzYo/m7gQrm
MMM2/o93uTTRpnoeqkk8Fkgbj9ti42x7UyYBi02R2cNR81e6SKU3nPeztoZlpdEJy1Sf0+vShlggpKkv

YfgoKBHrZSR41BBPvdd5ledZ9/5qMOggm3p65O9Xw+eql76/Zf11X9UCP8wjZYmPQwY/NX715O2vJyX3

H9BoU4AyUScth1pJyb6cnXzzmg5pNDkame75QEU7pH
hQ1E7P2IRCIlgOVNGrDZhDRSqW2R0iNMAek9JLwEAo46zLw57JP3Cx8COWVIaInWBprlxy2eX2+zFOp8

+KZPSQVGsN23MhpzHDWfGHKrZlsOoXuyn2UOZSPzMD7f2yv+OjG6Xgitb0rclQ/Ndv3rJdMP1oAhfcoO

6kvLB2zb/Rn67KieeYj0+OXP5nKsfVYyyKOvlocm4G
ErAwDAlbt8eEoiFawCSJs99Xd5Fk1vtkCJu2aQHrtk1Yy6kdLGTV7+DciLtEZrn84liPO/N+WdC7sTLw

R/+LRsyzfWS9gc9i8WQJkxu6dp1wHoeApkC15Et0QRdWF9oEAp4MiiA96L7wVA0Vae1q1tOlWsiL90ln

mNSzW2rM1kXZlqHLwdvu9KVc+n5FN5Z8WJq8/ecZxr
n6pb/6+uLurQFbF4tRUDNjH3wjU+A6HQ0BNLRu/RQvyNlBpVbaW5c2BEhHAfXXZt3Jpop+HVoM5CXnle

J73YzH0SHgg1c5EzILSvo/qbQ9Q3ItIVBua/TM8L43rymMP0KWvFfDrFk8cUYr/YTNz09EixaJTfZ7f+

B+Ld8dhAK2G0WAzarv8yK149affMz0J60qqkvJ0aE9
GxRzs6hot/XzfBgFgk7DE/37T7XftfvQpVrrSGV5uAkqMk5xvtS9vqdvyZqX4GG7EuQhgVNrBUgMR1Z0

5HZ8N63/usvU/r/vMn0q+LKPLTOkH7CzNu8fRdCMBHb7KrC7B5zwMEO20JxLnbBJq3pLrEVSognM//js

niO/a/whiUm28usx5QGGrKHP4hItwCaOU0C30zDuZ9
+dSk6QM7eNMONRbpAVLbK2w/EiQIZMWy/YTqe8pHqRJrzVdiv/x+Ks1Td9UL6zEqOVe/XVUxWjePY3om

yRnFFuEQ9me+pQ57mVqkTT2KpY4G1LwFPRT7jlXYq4sc8r4zLpI6A3R9eOMla/4opykp3WbiSETXGUXL

mYQ0s7/wlNZkdJ+JkEB9+Jrx20xSm9tA97JQuTrzuh
aqhQM+aDgx7FC5MJvAekifPhHzebWc1Ef0I3eVR9I5c/KcJ1yimmJEkOIo5I/+jhBjUbZSe4rgu8RNDW

DoR8VUpjNoWj2XxYsiLWmZR1ySaGUs+WUErlxnb+hId+E1acJfphrgWKhanTuI2TyWOOM7qxgjo54kU8

9jme+wNbDnEJVYT6KJbu1/gm8ox3Tcc7p7VyD5MASb
uXg622MKz4z6qcrP9pdBWuIlnNucTv2lMgIyL8LGjFdO0pSj5z6V03B42NYXe2x+EOPsPN6L5x8AK87L

I+NKGrZWT9bS4+bWoQJHAOrfArRqN9KPZ9Mx+fSLgeDLQGyd5vsD3poYnTn5KKC2bjvz6rHS3elEGjX1

N0+0CGmv0zbCLB2hYGPea5bNkLbJWwqO/9YuB9UqFx
lzDY0zrde7jdhFFJsbcdif3c1iBD4b+ZW76zwdd/Ld4DobRoJ6DuhsK5KBheqoP0oBhR8W1IqOv151qe

UoKaw/NXD0vKaj5XwIozXwmpe76aUEi/5L52V9DgaePzo6Qh8F8Uq0V85dDHy7jcISrqLLVW55AxOglk

1xRI/VZVRhjtoHxq8e95W+Wdg0X9NsIGjwv5nMC36j
Jbx/y7vpVPjkq/zHIpH7dyMhVdvzGtW+Q8YmB95navVjHZ8tkcz2Q/NXff76g5xIhhW2oCvo3uP1Cp27

ydNzWrPL+TZSM/oonruS86zwfFSbC1dX9jJ5eQJ2rEFA1h6BuxI1ZAHLHW6Buf7dgo8gSKRfuxe6ySBU

HleV18nISNwNs+sBxxroHYe4Q3MpMLq4MsO+H99Hh7
P2poJIHoefCXHp6w/gwEcm7UQsEK6+Mj9uwLx82aLwl5JKe3AehBewT60l/bTEBFxw28fIhLHbMPfkEm

TCi+pPqy4wp/OiJxehKZM5M3RPtpsnsTd6zQwRUtEplyJhDnJM8EMbtO35AuhuaoTakuN3ETb5nsAdXE

Dyi4lDEb4Q3dHfogBMhHCMcRPQevwIILZmxoHauwec
rfMUnyX86VI5ao2VTPD2dBmicR2pTEKAEeooiEDRkks9z1+dXKdDGjupILP6t6xAwlVPPTqu5nWwprtX

fcSNoHM3qPpXyEaBnSoAwZf4cOjgsZcMWidGKvd3YC3LDQ4UKJG0cFs0NhFgGsEyQbWyYWison4MTcs0

ZWfkvkSEJGYQML/eH1bIFkA5WOKpSZxyS6kt+E355s
251gIC2UQZ0FxPGSFbWe66JBI2VALxkwp55/N15H6eWgDm290cPkKRGRWXgD/xcKn+kSv9ALBYNEAsd/

bw3cMt+qBn7mxLjO+0X6ZoOTIlHYgV4Vq7DQwSWm0Y77ZiHIgiL9V9lC3+VRv/Jf809fIpOgdNDoxziI

RNGK77SLGqsnEeHNsDDLNwI2Q6m5nm2/7hf7tUdNeR
dGAtP5GnY0Y0gE0Xh3rEXcNnkfc/EBzp/aEWL5P64gt84wR0YzMPWWliXwXi+d7THtKEV4aoKixyy5jw

3amxg5nwcFhTpe6eotq2BYs7DuG9Z20WUAAiTv/eIIPrwYNVJ4alXGD5eeYKwQ5ABSh6c3Gp6W1tLksi

UOzsvwrK+u4gleZjd+Wym5hcTclpTgFNq+AIyrHgDn
sbf9UpQBXeFAi2du3vevuilYDFFN2rFrSp+GQhqTP8J67QRXjeuYFzmC2MmXs9j8x2LAY7YSUW0zaeTj

yD5rUgtUc7PMwKG9OgGxl5XNp6dP7J2rH2eCTxnwortcZg/12wp5IBt6jw0ekIx8H3p+BizjOD3JrIuc

SQF0q7PS2SVc69cKfmYm/EzSEvJxIPDGez3J5+bsBZ
G22+AM0ETP6A+enXhGFvBNYAhqgPFcAXh6ep6qEbLOM9TbI5+F++4s4g46HOHad+Y4mXFvoU5xv/qLXx

CM9Tj4ZNifdLa78WDHhlK5VJ4pAbnQ55m6ai+2rJF9Z6QjloPVeWCn06t/AJB3qCDBTWFvklz4vHwx8p

5jLnS9iDTbwRhWvVGXI9ezE4NFAkRvz3iTvGpe7kSp
R6I40YknMQBvEfYvIIYiWKMoOvoC7kupolwHWyZa1PCFM+YL4Z6dYmPkMm25NFPSpgPmV+WbhUTBW4y4

Rc1v4CJh2PrfGfgHA7Gj/BqlrgI+H/IoSC9RL4YhqksHzhxs71rxi3At8FkTILVT3frqaBIn8Y8602x3

Rj5AAdIPZZjIy6nZ24+iTX0WezLfuuD5xjNnuEXTax
WG+kc1ah8/szig/Pfo6ksVJXDocusSqqjQ4C9vrmKP4EASOCUJIswmb0iAgfGMABSRP0K+YON3UYTlJU

K3JKtH7HTqZe/xY/zQQz0bxI4IR9g5ewS++5NJqY7te687vPVtTOASk7NvzRxEk0YcQAwuVUHwxEAPi0

c45Z1UmeRG3lt5Tm9+A40PbheHEu5O6pknQNfx0P/y
AFiUdrzDml1/BdC7FJUNNXyaE719qdBhCCTltAFcI4ZxVtXFR5oyTnGLV8DDgFn4d1wWcys2J55CU0aT

0Plu5ZlCJKgM4R2iVji5ljsMiddU1SA1SMp8Q4brDZ3LpCn4FWH7dbPiWj9+DzArTHw4QtzNOsmkmMAE

yBrp9D25ZP5jiWA7u0bvnsQghXe/alJncg4of4g8oP
ZXpLfRdb5YELxwzLkBDN8reewlSaUG00pQRVkHX+vSWMbx8jQGLS+wZW9aDFyh1jr853xW9Z2Z7dAShv

Wx6xQfEUlcp12kPyDdvO9koGu6LUTiNKZLgrzlKcgRcY8EqFn4JJEuYTxk6kvIXyMVGhL9Be8h6/RXNB

z3C2MMoHxwBLnJ0xC+JqXKqDm9vZIgKp+ZppmGjBrr
ij2+ZRhKCv9GbpyMWudSQnVxrvv44j3Y1pzD1asR2yQbRSnG/9Wjdqj/RXLvyFZY2ZBoxt8bMMGUv4nL

6egovRru7H9Xun1+qHOQkqXKXwhkGyz0IX8YlSQrllKv9kGHB6w1USZY3PGCMuA01W+OsaBisOeUZXSB

P59X43vnPIN1KNQoNsr2/efv8iNV67q+YjUxSkfxp+
hKzN4VjJpPtg9uYT6shibZhqz35PjmX9z7RFbssq++2D7zcN0/2phuChv1ee3EWx9Lg40g4KjAoyvjgA

tQIrQhAQl5Ot46VKSsHfqFmxWEj6cX4tvXi5iLQbihk2jknc8CrRu4bc8dqNsdOxt3+BVv04Tp7ySSFX

480NK/o6iccsn8oQSLmMlG2g2fFuyScQrwJCClBqq/
rdJnG8eJDOf4VrsqWD1R9dMVb3UpLwjX8gsWfvNd3jDtAN720TZATDNoiCYNQk9Bio8SpQGtKly93dPs

8UChVVHEtpEkguE/v+rYASzm7quX4QPfJe/oj4K7JrJqdEKMQ82pPiyBljJ3KvhFPwLFtaHTYtjBIxje

sx48ldfNbd8kG70hUnNCfqoEZs9JeG2mOPLY3XZtJu
J9QEwE+da0n7bvCPoaT35YJMq6GcoxYSaDjmh/Z1sWHoMT2wD2DmDg4CXHjWt1PpzZu+dqhxlBOxUy8R

ESwMnF12kqA6Os3u1Wo3jMfLS7aC67ZwZ4ntjytNknK6G/6oVZkPV3Tdqx8dZIH/15OUTN1DRUyusrRU

3uAoM1FpSz9+zfgnX2lr5C520fYzNQx1l7+6T/89Ju
CrNYuf+RqKh1b5DkMfMbPKuGdW5RTfKQh/YlQlLGdjpt/oFl9EzHWgK/+msHsMG37ak0T7OycIDM+ghr

dlraNyE8cn2p5wX1EBBXtMNRCTX9siCh+vy0Cobi692rMxVyi1QxMArBdXa5CJheku6l63uk4ahJJzPo

7QgYjTvu8xN9otnLvoz9nLyU7ckZ2kjihdv9PEENxk
RIk0+6gghsqMTVU+0tOX+wnzbGyPRkVLywxSbKaUNcUJP8kgVYwucDFst4xlQOHEckTP0ifDZRwM25vG

sfb6g1r3Z/DQtU8e6mZYgoIMYYPdlxIzSBN6QhNXHo2EkbZBcccbseG0Nu38zJ397HYfsnPUNo1/FEa5

kXZDsV0VDXzk8uW9Z7KfFyxxqocTAe6w9xf5eJw5Ee
ze5aWMr58iG7nJBbi3h7mdbc2xQpPQfJlk3sh3g7DEiGl4v9peoHJ6DBhcfycnDfxree00U5uYdmvYeu

5R7+2RGSp7V/U4BKQNzxizdt/2s7igbQwrBuNYcRmZ/7QXYUcab89X3RfMtyJlqzeKLnijsZRNS9oLFc

WNZOwMmCorM5DPabiTy9i+CFb+wjSfkRvKVxaOvmVG
YQQn4ESwjOJ2PQBAc+ymMeJy65zlZX1xPIou1JO+j2BtKs7GxoF4lwTbudOVn5Q9gJ1bAtGNfxpiEsPT

op3QOr7jfJtPUPbycSs/3jXb3ag+b15myA26jMZ81+qZQstxrpW0WBNd33LVi8iUImgSV5fzLQptGPyJ

EA7n6c3/Z/fdtBU2YnzyZiowO2GiSe3RK9mbRkfOS+
NidSEQHJRO3mQJBSapHH6P3zxPwEEP2MxQSmTH1GHZW7tIQfaClzul9bUugWU3HGcR/Mi9qKiYG6cfYt

A0KP4kJxau45XtfyWIUwssyzix1I1wtkG4fcS2xMc9YsFcg0sP8rnE/OFlJ+hnEUINeNBpdLDy8VZs8T

wert9svSrkBgoQz613OWQ1MV5uierv/Jjh6lKULRC3
H9jOWaoyy3Qpxql5Zqelpp4IawdSLBOT6+M0pYLF2XwYXWGBTblnfvAfECA37lhrSKCTa3f0Tz/CuC5+

Kz0Ydk3gVMwhxaA+BlDqS2Z71bhpRMSfk6h1DZ4r4A4Vt7kVqf+CSro7GRJ/sXES801VRnXi9DxmBFSd

b3LcXxFWa+QlxKii84dPGv8q+oJds3pT365XRme/uo
VCIjnx9Rk1ilB8neUT+Zq9fHPI9eXKD4eID+baktUGh7ik/zPrOdEjozTZh0+cck/6GK9rmrAtQUpBmE

dyghzJeV1yd6CcF6suRBsWplW64ZOHIvMj6+zFHJUKsTd+K+p6MUvtNHPu44aC3h0Su2ti6w+q9QvX4T

nL9GIM8bv1p9z1id5z/UdUQ8PI1M2XvQMH6unAhNir
xgIOoTQdrdJP/QHvcQZ83cefFHsZA4XYFcG2eB3xwumeoVtDA8HSv3QRr61Tr6sGxt+gtXDwA+wDcJvV

tG7pMAI3Kyq/gY8hkly+wmkHqgofmNA21qeySkaHfMLbl3ewt4mPyEoDPPctPCWdTfV8KeYMacCFXN8j

5gthKtKw3UCNXdQbiktfGmA6e7nzAk1MWZ+x07L9aJ
ETVZSYYAU3WGoGqpI6Yq6zyDhg5f9nnhwNbiV+hGX5JfU/WqkV0kswGZ3zqE/kz+P0oRswsYxMx4ADl/

fU18uw1WLaSOPBwKHA7lz9bKLFzRT3RTdwlrO5oyYgQqsMBVfvJJRlulr+wt3JaqOTV9Jqu3l1DDabLc

HSb2Pkq4/sLwctIb1p3FWE4dONo6b9N0RBiGj4um8V
vysGOPORyL5lfrg5B27Opvv+hhwRKVbE2rPq8f0kv/QHHrI63400T3jpRHggXHD6Hr8xZ0Pvlfl+F4AF

BIp470TqYCXrNpxiqjk21oKYRL4sKTluz0WnzWVo+1pzztB3aD5DjBwvfI3rt3WOXvgUeJuyjgghb/Tq

AjQ8GoLHbJmTDEFV/6+4AiAXRt++gtiTWXoADVj8Dq
gxLv3+Rc/teuacVBk3dP1YppEsCfL/8yMZHqYFNeijkdcJO4nyGI0A1BEZCGS0PRmkdkf6NKHzw9mf5u

+HLWI0J/bCLcA+LtZRzSyF6jFV0mAW4ud41GBdwhhofm4RH74PrPs8Gn7i/4cjvB3+OkWO35C9jIq1/6

XaeiZQnTQN6BTqGRBveG5jZQAJTV1tZl6e815uUiKR
1zitakHjzsBjDKI8m/gL9JZqrsIjCL0lFJUhTHALurQVpm8LOAT/670LHTAhHtJ1o+LUH9NuT3FvvHWO

g0nRQWluuGUZjTOuudYbwM2n68wkN9FweLuEA1KH9SXnHb99jBMUGT10T9S8qXXJh+4wW92rW2jX1MBA

2fFw7cNwKa3WJuGmnph+twFTla9b844npg1bodxsIN
2Sp7PnHjWpi4v7coecC29P42bexFvRG8R8JY5tVkLtLuDsd+65XNKiMgi2ooiFo9DSrbwZ2MIXwpa4J1

UIZkcf1ypHqr7hB6nz/2sgouJnKdgvoNlDromQjxzvxI2NY/G+lnZyZySeCMm+eL3WoBCXGAa9JMCj7q

tyz9XzgpOJMiuJuzEQvWE863UIyw8NvW96H22Awhaf
eSTJCpvaT/KI4zofUJYH1faxpu9dUqBRf6ri1k2EHBI2q/uroc1ojvn4tWevhnOoCI4Sa2N5Hc+LMQSb

r6qj8ueB3BlNZ4kE7gn1XJ/B6jad3MWF/nU8IRMOKVBRu73c6H2wPGk5pMekgbDWJGcgBbm+xMgdfX4x

DQM24hexBQ3W0q41Z2B+5dFdPPhCuDjmyGA5RmsSxN
MeBX71lFvEFF3sRdZyK0EGrK2EvoJ1ZR99ewUrb/Vf1pR2PKUtS/ukQhIuLq4iJxY0lRJMC/QUco89P/

K2AMBtL+EJa6SiSjeYskeo2q33fS3VpPmo+TUstclFsICJS8TMS51lWBufLmyqG9bEaYx9Sifv8KHBat

94oXHW1oUSFtmVLSHGnjO3TuLKOm5bUSkJfgicB22O
2Zg7M0UPfjZj7vo1/ZMpblx8sRZTOlWO7FTYqkJqx8+fK0+gPqFwmve5OrHqKI0s8KTuXH2POVJTm5J9

/4uaaayn/0laairTZ7T1avIsqiKqDEkfQZAQBY3PoeoTsNBS06PrLnZ2Nr3dvSS8he6//8n7oh+f8DYE

J8DbnCoxtXy0+ANesnHgvpICThxVu8JWGQQ3JcZeMk
WhYYPeiZU7K5XqXAxY7jQ2VsTzMcbJ9YFzEIy+8ZR8tI29DUrvroooV298DgG/+4xfQzQsmkJlt9Pdhy

di7UptCm92s7BdnmkktbYbb6pbx6AmFLrXK+CKusKPomBopWYN4i7hFxQfHgfPvoTkv3jYFyOIthNJND

cYlys557f+0j4LDjKe2nsQ1AZK/aVDf4bq8s7sW+Mj
Jkg82ygQS9OBilgZ2f/an/LP1UtDmOkud/T/BMVFsfxt46deh2kOiwlPJ2sBtJ/YV6LATgKz+LnMPbzV

ZqIPepr4jyEH4ftmprTbNptW3GoReuQf//KhlswwfBIJLnGenCFHZvWtRcigSpx1pNCxjUEGUZ7S5WF1

HPyQDSvtVY+iivd88BfLxoORT11paApmFQ+eYBEOBq
WpywL1IgjvKHzGlben7ll/r9A/anjPysu30IIM/23D4xyrf2wWOyQjX1ZACJBYL+g1mHX8Htd8zQ2uEy

TMYyGGVS5FEzNMp+iP3WwynGuqDBJinC6pLvJy7ukko5N7cKPco7k9STWpeak4s8OlIHErmqEFaJUK6d

GYpzQ5CDmod6PNBj2hUfqceaaS0m9YXYs5Y5Wi5It3
sj5BSRbmHu1LaSUHCbe6glDQix/OIsCj/Ktfo+RmAXZCgMX4ICfd2YALP+FjcjBwt3FxfID8ZZNEm/2w

qBjyMlDk5mdbRT81sHAF45MEssBBQQjdCraAZ2cVu1UTGqcdiI0LBMUczm96aZSvfb9X9X4ltH1Y1W9j

sQew8asQPIFNC3f6n8X4Xg5xzZnrC7Lklb+ylZoBKi
iCg4V15dhNPquBP4k/4DXv/ZrTPyYvNA4n0AEXkSVoW/aWnsWtO7q+SOKUz49SHPoyVuDWL71IpyIK1K

hoD/NYei6fhWEFdRaPHiSVY3DXFcZERiUiD+NbO+QFi1EWWiGTs9w4Xjtra78kJnmRctc8hJMg9Lxfc8

hJB6hmFDUkwl1+yVEdRRtUhvnm81yzgQRtZ+c/pCZj
u0jiR1oZyuWLtKVzyvArFHlXAbWIvq/pV1J1khOE9DpwMkOj+vE4zYfZ1659aipkkMeaOKcKQtKi8c+A

whP9bEmZqoAy9ouXpqUm8KiHuXs74qYYxWq4d5id/OoWAPrlO6Lp/c9mQpcsjGZmPfjV63b+zYY8vnbs

Zb1zsz4Y12Od1G5Bjke+ghWlaDuweclKC3GmhYn6JH
rgSl0Fs3Cfve1lKSPf7iJEmmtOH/3/KBeFOjzE+rRYi+z0t0Bx75KVR8ng5dSkPae9EnALdnL7pnwlDA

YhPlgKTG6IdUu8p/SgaSkMq4jvwpv05WMZNLJ1DPG95bUslK6JaHe+BAJDz4CTCEVnbF1vG/mzHlrQRm

uo+D2/DWTFFP0ms0ZhfB2OoLhw6xl3r37G2wB4TW49
R4eT+uPIxOkqBHMAEIY3wKvpfXu4Qzu1VxZM49dINTY/4TG+JRocA6/63BKVcJA+dADHhboV68zYklV9

lj01kfxDt+KqyC3WxIjdgKid4JFbTANOk+5J/9OVr++8Lxf+O/8d/6k7PqDnI1HBZWpQydEUmtibJrsm

H8L5m1b6MN9myeoLeBniAubpK7pjdz67iG6kXqoXWi
N7T+L4eX+ilqS9sPrZz9vpg3VL73ObputeKlIa3DV3ivnNgp5jJ6hwU+goObgsa+yyoYDw3OqH1dKbTz

+RZNDFT+Fv3RAEtqRvzZZTwVkf8tFgDdqyZWPLxoRhOf8QvEGfP6asELLi5+132Rs8FKK7QvUa0el0SH

c60ctoMYoRO2M1vmfhwrVPfzzQVa5/BqoeUu76lUPQ
i8/mwOmfoFRUg3nS05crPUk1vqfC5tiJxx9DcNVdKNtdz4wwRV1qTg9ml6fWDr5U1ybxwtOkihMApCHa

xl89MCA4i03pXRsY5kRLDXnyex/X8JGNrIfGX2qjII3nmFSdbsUUDvaP7wxki0zpqJagn1IFHWKmOafA

Br60u6IVAk1aGJwV4QQ2yQtZyNDqVgpgXoW/E6qS/Q
1CInNlPB5juvWK0jZL1AtvMSXYe+hlN+fKraQcFrZ2eqhQzJtozPwRExmZGKjnQ446asZfewhPheT76q

xr5+H9DQeOKSDIXnh8dAVQcEC1xDddDyxRT6n8bSNa5BYwcro0vhdIvg545L6Dj41AfsddmB2H2ImONb

MtTJrc6KaNykjmiYSOaNyh7vt99/Wfi7/QEckEfw4E
sX5wCs5ABNL+umBj3ns6reYiKdefRvV8FcGUDZKBmQ/OTIDOge8TJlUV3cNvggajSmMi4H4OXc3F4bay

fdZKfUcfhOcXOor6Q4Y0h8B/T0PLRazKyYYVAgmyYTxyxIq+emzeo3wOrQ1W7XTor4age3iiw4RtyAUj

z6v/XXN4yJm3wpzaEszt1mYxJtMf971wMXGeehAvt9
BijwbalfNypbICbMyI7KLQX6mAP/5VF36JPYb28jt8PFrHqc+CLcvJTD2VZaSvfOnTNDJ4Ums0RIdsiS

EoQd4szbKYVZ4B9+CSp9K/d+scGqkYPquRXbtoQvX/k6szMTuwR2k3Ve+wBsh4BNaK+IpLnNKoEihjvQ

wRgwXsf6kwC6int/Z7cjyHBa1lLzK11Ctt6fFk2KwP
UuV77/fTLpxE0p7wIv+jKysp7mrtMGRn+ontUAr24A6BdSDlTuZmCjCx6VOoKAGiRNy106xGlDljsR/B

AkQ/PwnMopmS74W3Vd/fanta/Efs4+O/II9PL8X4WEMEmv2wHhBzbLHMXxnE64+7YcjcbOjx1moX0npl/Y

FOFNiFP3fQPp2tlfE175EdQRZpE8cLjIiljf4xa7z9
ebUywOmFA8Kkhbj11ZHwp2KIBgS/3m8bYgzkvYUHSzizH55jRheWj9fWid5vl3UZAjOLFa11Y+eTzwlL

7EH6kt2a+sB0enlagAprO7ra2G7QiuHlxwpUYLuDCXvB17oEcIlW/WLtLvTLUTwv2dpnLe7yOimDWTc/

zFWxM82WcL7IyRtHptSVOZCeGaIONQlUN52uHCdbWq
lSpzONrvOAZk6xptm+virTEyjR0uhvNStGovb4dUIYkeVfuTdP/E2hpfPTPLrDKl7B6/UNkkukmN9+29

v2XQSzfaM7cE6Sv5ShGeRn38Zc/vbyCrx2iNEzV/jMRvT/fg2tKuNUZNqAMqUMLIu9uxWsYzl5I3B0y5

NHDILwb9tT8n3u3x6fIDBMqeH9CynY/Mc3x12bwiJ+
6Qv3piGoi3/lTTPTja1pi2Y+QkJAa3fe8ET+2PJBAlK/Xls4OhXVB37IOWuk9YSXoX/8liFBYc37mGwW

hkv0IIq/dIdZyBph734OgMDtf5Nd0Xny7hN1v9uKDEWGJQOJfGTqMAIJ8ntoR9qoTJabEFXyurI3tWFK

FVzM5nSt+lHbcd9oE3TA6ZNcOVjpgkp8PCcHeFpvuL
rWwWBiOMFttb6T2ZUy5BQaaWb0egqNc+YmrgtIzxbQEPz710B/znjrl6X2U+t8ZNN0C2whsEfH0qXe5w

vzwlGZD+ggduyv42Z6aH+6IAZ6CJiUMOYnMPFZt7WR54YkSA+qX6xcmSKTAObFkUVrLFVogONdzAS84h

CYoBuCS7bbCqxQl/RTGXd135knjUW/vhOnr9+WnOuK
7AlvChr9r5HLDnZlgPXs6I4r3mhv5czT1hm+5PqW4VDutBH4OOVhZ62XpQNZKnYldWboyeHuciYN1Nkh

LUk4Z6+X8bLB6DDjw+5Ge7LUQlDv2hGzkvU6T9xotgzWambTwBVhSvgxkIS/U+uInzSrDN15AWy7wbxJ

SUVjPDNsamRI7ycrDRiXLmm53Wecu3aad4wws2PuvB
JCrkzcIn+oZe1SnaD7aj+Giovani+xkhFNp6ghtzwhRmbueNtLX6u7Gwg/9RJzPN5Ql7mZRQyJy79fY+l1Jr

+bvEvJjcarfcrftrsrGDgsMkfCJ7mqRHUDfXDdwoiKEvJ1YsRMvYkWq/lzGmT7RFZIygWvj7CYyhzIam

DUDXKYsfeKTx2nIY2ha5aRvyeapHxDqIDUrHtoGiN5
5biU2gUjWuJDWjPF9+8ksN/UhnmGq5VYIBwLRf8vI/arm6t1aJkufyADkkvHSdJ16NxrU3Yu9qD7DvFD

78FzWPv8kBp5++Tl6vtnCCqBOK9X8b1+1IPJNXGfWRboo3oVx/Hr5P6StT7ko7/ayul41QGAl7ptXFwA

hgJ46yWWhH11tpUoE+K4dMM59LdxjEfcTIa1Hk62r4
w0dyg4oNGGLpqypd/ulKhfprCeZCZTEHfvJV0jUjKYDGEtxRm5A37lJ9mmxn9w6wWyqnfFv4Pg2Sz7eP

Mp1An/VkbsqF0Zfi8LAuilw4pp/vcWoVWyEIoTXMBZQWnrWN1Vz33zLsMWZgLBcxdIna35v4CburnCRB

GM6kBpP0HRMfRRH5mGsK8cDmzMHtISs8PtAQDBHmqG
keYti8wWfkDndwSoz2QGoeiSEzYyz/RblEE8wIvPOwfsBzDYQZkKNW+zOKf6XQvRkUSCNXAGx+cI7NYL

X9g4YnkWU6e8Fr8VAKB0QpAMCUnjp8MTdfLN89TCyvkXiQ3Dvv470AEtOr4DxAUiYQYbETFxgioQwt0r

mpxCekreOTEB9lBxd5S1VZ18/H75xfau2WZb2qsm8n
GrozfAWiBfvzsGDQwEiSIIA1ocvHl2afOBJpCM1ycdpDFC1MagG+w8+mbJ35P+xo6KwzYhvXF49EXkZs

u2cSMfs4FB/6kqk96I1mY1WW82h+7Fq31UAMxboJ4s7h1mZ1ZXIKVoF16REUn2nViYD3+pxlzNOuJBlQ

OC5Z8zzSInmQsnfo9S7b+1jxjbIIynDjn0FC2hBUfq
Arg9VKsp2ip55a8FhdekFlZF6CE+YCb0shwhzErPeZuX4AzEbFUR1Y3rt6d1lJbyksXCQI71XNdFUnJR

S+vkx1+K+ki8KB+nTl7XpC5XBpajaFx5tuJwSdOZYeJlYrkkjo4XMgCS7aPKRccj/MsESElaHxG5Qo+0

paVL8BRRQvopwa6D0dV/M/t7FTF3hrHNFyUsXtpmMl
NTmAZAJMQIlTPkSEpCdFipr4CdpEAZU4rHAs7y96Tq9m+Sm0xupwhZb3kT+8nPq1C9nSuKJU6kxp/xgi

5GT0U1S6ZHUlHRo5TT7dny3YD9A/6fFYFng2Pge5eYWD7v09NsUFs0c8HRY5OTqjG/5Ar4mpGB23JnnY

mO+QP7vm1EmvdSR8Ug6yBzK/BsyMNz2z2TnmlRI9n9
BXj/Gil8gwOM/eHWSbA4sEjkCItaZ8QpqSpL7rDdexfHPKIMxvH8oejCpt5AEVVg/HquMLtI6rnOfeFb

oFMVj3BRGShYcVUPRZmPaRtV+7WBHnqeB0wwYj1VVuemhCSnL8obXbBiNV+JZMpTNFcSyhrt5cvoD2x8

N8CzTM5KkmdtDUyu8qpttIFmaPZRACUxwMTgdeZsPA
Lnhkwds3oUA3xniQrNG1+Cwue8GLsy9NJ8BsdeGGixAfSSPUcOWtn1rf/BLQacIw+aHgf33gy8lp4YEk

DfqIQNiS6QZwqeHRAZ72oHW0A0MD5fN8b12/fevGhSrIbgLDtIU+jyKJAZIrQIRtp2hB+6LojG88uF2u

ia0bbEZK03Tmd6Z/CQCMsr42gNvO253YieCK/xhmUG
6GhUs2BSIZrwZkG5MlQdwqpCrK5/FgePasbnFsoyEOmmTP/GFzErt6afFz5kWNU4CMHj24s3CI8V4lOL

zrfqPdiwHGPc7aDeSuQ2Y/BTfU8emfyrze7MJiHqoS+0agw5vuQ7rtI4lMQYL0uSbrUROo04FjBjKA06

XnNGM/ni8BO9B+CpNU+GIHBOUkkIcRy8QGx0aYm3IM
MactECdAs81LWvRRlytH1IzuKACQwqqdIReTPG4aX9P/EFlmei5bZab8KP7gdIaFbrMGvYTbVuJPXd6d

3gBvQAaqjxv3Asg+OXN3HFykzg0C/gQogrQBVpajyg7rHMzCCRuxxpiP4wCNeAZSV3cuGNpjM7NSHT16

+jNWNNeuXQHnS4YEUUYt418ZR9Lq/r9mciH2vlxsrF
9I+YmI5ftmY6WHIsSDl7k2OkeZ6AWgLEsBiMDeDxJZUFF8rDRO90rU0o1kjyTeQ+RjwqiT8MUhhE08uH

6/XW8sIWUxenxxlmvzrQM3q2JYFQVcIQAW7OCixODtjH7q6TOylQRSjmXR56pee67ARFn7ikPxdgyrhp

0LgAVeLXdukE5v89moXp045PWYEjEWt7xSvudvbdKn
8vBQUGoCePbrHKDDNxvB4LxMq4Dd5A7QJRM1y5lj4Dhun0IBSnpehkMuKbefa8zKocYZs2SWjhQf0X56

kfW8aYFrPODkSXXTZeHNKP6vZ9Aex2gpgezhNC3+6VNfM3pL0h7q8hEKr907/lDxToY/3XF5tNjhffZK

UZukFeTi+PKyMRjwXH+Liq2enREmOh4VntKKIWBqdH
0ng23SilIyZ39gmfVVn+5/yBIsOtmZBaNKVLIj8/O5XSKPrWnkjhTsPrb2wlXK0xZsCzLSFQyi45hfN9

e2YE6VAnE2xz046BGWo/HfhhA3OT5d1E2G2z9S0SWgGpGUMUoJqwntMO9KOYt/QqgVddd3Ff0PUCPuHn

VVC7IKEQ9GhhfcYnI3wgElvyKlVrpeMnW1725OWgD+
JXRE7lvHy88ZGB6EAKpnsedhFkSTAAIDhSSFrQGO2id9HY4Ga80nNfPYr3iAOhhtmMVE5uiToGDZXnhT

FG+K6sG3x3r9tzQdiuk5vCrSQfjt8B0eYFkozSSws8uObZwkvu5sdOO/Fe1PWduLpZMnece17y9zkoja

Z3SDqOafN2if4PnBua+CmWCwovB2CeJunmZmfBS6en
fHwfLqwRsmuWl0RY3QQf0YHde9+8ilFz6RhFBWh0Uv0N5xVsVY5yk+JHW8e4M+TP2B7q2kRmaCIeXy0K

PKfs8OQZKDqv8vsKeo0/OqJYkdKAHE0tnmU3UvwHVfqB+LkZQhpgdLnweqh5xqghnk2ruf9hkeUtpVl2

E/P1KAW+s5OkKDgbrATCx1f6Ess+sYYjXdfNdQyLNt
kkcBCGEG20esnfZhGqH5Lmmt2aSbRYCCJ5F20RmhGC7+SFsHE4V/rtfZzLYER4pZsPa3PA0pDuPMMo60

HY3uw654NeVswdN0I29tUS3xbWkhel8nDvfIc3vuwDS3Qc0rgnNxSltCfuVL/Mi2j7kOkxguAxLuq89G

RgRU/ILEBvIn1MTEGzdefImOoumO1QUvXKYdd7GeWs
0e6QfIG530njdmed4gUz48xBYCJri7C4+FHOtNwErBlW3oZyN8/SI/TVt9wphkakTdBFhgHwSMPHJDPP

45furUSQh3628oVMqKxF4WGq3Qtr4SC29NM2CW5z54tXi5IiIJKN4fvAh/ox1OLRm9jMooGcpnMiWmh1

cuWuwm3DcTMkcstTOEhbnzBgTwsSn5ByudmmcvZK/k
B+R079sUnqoJzxiX1RuXIeJ6l2s0xxOonVwNAn9WfKbAxdaRXIgrxAh1NhKq8Sx2kd3xlrzFW+sOltgm

8WXsB2i2kasriqK+LJVBA0gAQ0LXCidHKkHRHyu1+sGmFcIOoQ/1WZctbrSYpKVby7VeUePen8wAn5nq

5r3nxW8gWH9/8EIED/fei4S6De+VDiyeAVzL0tA74I
/9fFmTz1b3JSb1qH7qItZ1SDqHYqiMvqLJuPDBlWuQel6tJtcGP0wYQLpd9YpxS0gT5hXva1YSNK4ZQ0

RVDXwnxxaK5tVIJcjivbW1VgjMYKLJ+LuM6BTY1RDBX7MrnEiV+76LTNI3VWW1i0uPNHiYEikct3H7sF

gVEiNUwY2j0TARPBq8Wu+chAYtndWxWve/O/3U6ZX4
wFqcWnkRsEH2fRM6OzejvBivMn3lcuVf/0wf/NUW7ve9adQEViEjU1MfyF21PcNHbBYBFOgWPVwLL/Jx

bysYvjFFQIv4KWH12ncEYujzeUDlvfafhpzVuks2695gtZgRysMmlQ+1b3TDqopPM8vgJpo8ChDSKwcP

7bXCO4bpBZg2ERiEYdKMhRQj1R/D7XKAcX2IY1zdHd
m+1wLKbLaw8Km/qragIqAajAhnyTsKdkRoM9mwNAabOjFPB3+/imFh/JqCiGKCzEhQD4op5LRt+wEOJR

KHX3oPRzxImEMvk9DFuWksJb2sALSZM3UwjAD01tu5+DTwYhgqYE+N/wnvL/M1iVQtEraEPmO/c5qfTZ

om23kvkQtsZBRQa1xPcEqKQU/Q24+2V4U8KBrhqrLg
v245OdXAJbialCunZyBO2TIsTip7DvcH1JEQ4WcNCSjIID7bCCuYNCr+EVPSWknuR9DepT0lryUaNO6D

R9zXQLq2nTvOZmYaRP0wQcK/r6z0hMdkMzTix6k+PZ9zgNvyHCkv2uxuxKiCqj2NvPZRNtPp0sspxjCV

P71EP2qYFSr0hvCKurssbj9bZ419JMP5PEJ7OINam2
7pfsS7d9x5JK2lMEWU6dHGBhrStOwtR8u4sQF4+h9cgZyPO69/CkkBVo4MzBJ3Q15dQ/53hm/1F7AmbO

NbGcaLPKTeKh1wgurukzseXdVsqSB+wqgFzBevDLKx0G2/UhNh/Jnks1+43msUI/umr9G4Q7NxZTaf0j

HwvklDk5mDDW1NTWuJxUL6gE1Nhkeh+lcGD2DD/XlX
trZIAKt5HRFUzS5S5bRn/bGlNpf3picNd1ARR37s5rtRIYcvQHbcSL0ZD0HTFwGHSP+V6B4W2ElOak7G

HT1h9toXZTovXzdiHaj2VnStoMv3l1NudE8MDntCvQj0XXFdICTzBSaISAy8kwxP7Il8jApLE6AHb3Nl

MM3NTuSjQ4/LfveMO3N/YfBR90S94FCk7Pcx6Ff3Uc
9sXbM3HVDEDYklvgLugzvbVh5+bhdqXy40wTHHWu0HVk+ZUYLd5FJnL+8o67nZ9jHOFtHfyh+JUq98hx

tpzuS9fUJAn2mLm7TsLS6aDEu6h4rDWwdMEiLtFwkgkeuSUCPt40yxmeq2t6rOdppCG0P6jzI5joO6+V

7zcRRsz0f+WDD0ytvYVCAjvfb2cpueYGNTtNwBv9iD
C6noztBRVhDNDtrEnXH+HpU/wlJSKJVX9as+MdYnrvnGsclwwcQs41o2pR3L5W/YtJDSenoA1xOVIB94

/nR++urayI6nl8c0dASb8j9LMGrAJpnrPTxb+X2yDLVYCDTAedrqw5A1ezlQrE+N7dq/tqhnwGIcnmnj

nm0BuSDszcpuQy8DDqqPa1w7aMXK5AFT5G9itLbDaH
jdYqv/iMkpjlA72zIKcoePo/p5H+/va5lhMJ3nesE3OuZtXOnFNGr8XBICRMjDrBr0AIlya3+JJys/v0

mkU1uag02zwveXZqOfzs1g2ILc2XTz/Q4ycySqVTDyv3TZaqsTt2+G0pgKbMIVND7Yf04elcTRI8kucC

ASbB5ENTMAMYWnPTDhj20DGYp84th+T7vl5nFCVJs6
GeACXLt+cGl91kwZqJodX/xNw+INazcIP6nGS2U0NqZoGK+FsLjYs+Fn5/m98nSOad7ipiHstp/BWS7I

zBMHbeKiHljw5M0cDwohMytYozL51HKRfLpDGGoPPyjuIA/BRcvWIXW3aRS7uJKeAmaXLcp3TEb1GrbB

9u1D/Sc+/sUgFaESDMaVxjwauMusui039fBTSQIdwO
DlWKPlopE7D6A4cqNB4c0gtq23OpIu5fZ/8mk8zASflI+sZ6zOZx8CkR8K02Cr0WKRmhEDu3mTqfOM8J

NCs+4tZn/5hTOzb643FoOyDeQ4n+vpdBkyJsv7v112NPDts3EI/j7/EjI5cn1PRtK2Gm4uCMFe7ZOXE/

eRc2ZNMAhkzwOB0GCoeFIh4xgHAZf7ElBXBa+/U52H
e5DxDD9yDscpL/TUc/r6s/+b28rbfQoe84iofleOnUq4eG3P4Otz8C595YbE+RyMh2DufI5ovGih+tcN

d2j396Xj9s9/Wx8ABpY5lopE+nTSK8PD3A16b8dMJViaQT+sGP0hu5DDQwXsbYa/TZvGaSxDioD38NYk

8zXAKa9Ywk8Zwj8rr2C71gQoPPAnLip8chsN1zE+bI
9qFx5uVQTJ45hRoPQfmiVr05lQjdf6+vPR81z/anVpso1qPZ+kVLzNpRWr9U2qS31oEIYhBroD78M58y

Uo7SKzwr9U5aH/BJt4U1RahoyoipMXUuolF6sCBpYwUyU5ntZ29Rj5+LsC5SZ+J72kOdRPu5+zNWTmUi

FoRqM6y/q+Fs0E1e2+8fAHcfuAsO+KktLQMU2lLzU/
OsGpJxGy4t0qHaRBpBPQTNXic6Tp/9nhAt1ROQP9ZozBjSHdaBxA8Oo4APY5MspTWXXg922huOMhg9mo

+Fe+07GezSKrtlrBzKaHQRylOs8Bb2qU3QBsuffrXMci+VUIIj1ushRP4rpXrtotB1EQ8bRs1MmbSldN

1DBvN7Dki4oSmoONc8KNKrrjyUVFjYZuivhOZDoFLh
HQACCx+N1gMKCV2rU7ctmC2Jgeqoh1sE1kWBRgI430InGh3wl/Wys35ljXq1JnU8O/I+Rx8Zo5AkbU2F

4sR+B/66CgcgUSmDsYU3Wu2uRmCDDf0qUvH5ml1Uy9/t7GtUMMoW41puJYKix6G0zjf97X/X5MOuv8y8

otUi3Qet8KvgZKpZP4WcwpG5hrPPUp7bNUqQ4Hf+y6
IpFBl20d57wbEtg33F4TWAyn0yFU7oj7vU4C0PDi7U6JiEYL+ncRqhOa0xRwYsEEU48NCrPdeG0F16Ql

/uNO26PLoScsN+cFv5C8EAGLQh51sc6lZvedw+0imKQ93D8XJskn7g+KF3agHmdDl9aHTKzjJVqhY9AX

OTEP31ktTU2WVl/PNAewCOqnZBWC58PY8o6Y7x1EEn
cNAmppMVcRfXN4tSN6jq6zamsDgscXD7E0DpeB5qInNqvfDD69+ejqdi1OzwW4iha711heSZZ6+d+4Un

OQmoZr7Jl5xI6k440/7CopgrfCQCwhPL1t7X1xuf0rMfoukx+ne/vNwjKnVMaMw03dERN2Frnl7q60dx

sxHSxcwGXc8AcK9lj91W1DXnZfYaCEWZytlGgWXtSg
WLxksZPQHo1/XLHDAUKBZRycOiddHniOrjXP2Kn0B88sDxMbJetqQPCIKEQgNrsZwNnlhsv+2o/H20pv

Oke5ECN6U65ydUc5BTKVJkk5FTWetpbBJ33ovP5iLhNPgqYOea3burlNQeYqOG3WYyoMwSYtlaXdwjmd

mbbLwhjoiRi0W7hc1q9UI22g2yQVLbxk7PoUv79Wbc
f9DPvh9RAVQTBrOvK+bmpp5GvJOD1320b2ry8ic4MP8+Ju/Nbt61F1xPnizHjuhrQdbNVkrDI0dk6DzJ

7iTKjm9mw1vTPkJtIRXRc2/jXxZLcEnuV5N28n5Lj3jwRqCCzti/nxZ++P/57W7/xwztHC+DN2IYb4Xi

LFHKdN2uQMsmgIf++o8pEukkf8bb1/sl3bLseiZgoW
56G3a/tnyJoud7z2I8678RuPickGwqBQvybNtLmIEGkKgffUAe5rWK7jJg+uVM9LWcfMHGT71cl7TVTw

hRvUJ8+Q7EP1tCX1W++fDLLPWa3wDww/iDpQUQsOscEbSGOH+YHaEQY6dzAq/aVgoalgyaUJK2xGnkUj

DrR5gZYL+qRm7HofivlSqo7tRV5PGvLK9LmBD8jVO6
sqK2gNEGygLgswvgDokaa++7oSLqy55UOHYHfEDadWmXBg2v5T0lPm71ytzWumfrrI/rXUzcuNVP7uP8

WL0MJawIu4fLJewaZ79wMoYdp9yR4oTqsKBK11bHewADiaubQf46zU+FWf4rOQv6n2zC3hJ0s4lvstC6

sW39oP/1pmsVVl6JNTST1LgiTcZCFFRhpLfcZ+A+AW
HD0MMg/FywHzCaYYZSUXl7qKXNB9mRIcNi58Nb7E/Cjy+xhPgIlPXQK17DU00v5UpCzGLV3FoX3re3Nd

7ID0GE5IBrksuOr1KxPMZWU+EgdzvA5Fy/7Rv9uY4sLKLbucJzpvF/ScH85GU0jqO4zUwdfRSACITCJZ

ARZU7P+ADElfDe7jMSX+yZCQwV9aF8icnvaviTqr+G
9Whyz3494X47kJ0LRxJxa6cW79m3+xooiZU1g/Vf3YQ0CdOJlquobuXsiIW49EHZmV4Ae4glgkyJT9hx

LTOXVeXCYOhEFAIJVMmlcP2NVILFqI6wb3QSev781z09v1fMu+0kdFDHagprza1+OGdZr8ieHxJBQg2s

UDxuo3YT8e134t07Du75QTHl3UdDeez4G0ibyMH2rh
7p/Qnwc2z5USmiSdnJ7Ej/9DqJXhPmDJz/LZF0DYSqi2N64ETPM8/z9gYR8y96RziqAqy8BLqybfo6oh

SZm9/nhs5NCb4KJGrvXH8oEebKL8V5A9k0fwRSUntrdJ+7buwsPqO4UevFREhXxbWv+K9OBwVXIqnwzU

JFh5VR2i306yG7NemzkysFiJqf197KwodBIAkI4p0r
YjtzqSiuK/DY3Wl7F75t54Itv08AixoNH31PAVFfcveY9yzrmmh4WkEeyvCkX+aq614e4UWKbgnftRzy

ZmpQgxk7C1y852lU+KyQVRzofEED+ALewQCKE+4IuepvVt3I4Nafu9n1WllwokFpMJBSmNMmzdgjvtBa

49wvYxQFKumQ3Zgd8Q6WnN2PjM567dXy70/bBFjDT6
MbfiyONqahzK1dcVhKIXUs0nwVZimOg7W1ehYs0Fl6AhNCI9kyWKeMNPplRzLzJ5Lj36UZZZAer9yM3y

+iB17JDXXP/XQeDmLY1+Nzm2J4rYJS5sGeOQFAiBKPdWkMM6PNry+3Hrj8v/H/eb09060x4jxs2ho8Q3

NdcE8PrgwrNSyEwgcbBrLvYn/pVOZbps928pdYJ/jh
AQQFgFFlfMD+KE+AdbEHs58vtdJ7AmT+ZbEso0MZk5QahH0sCi5/TkAxaZ0GX4aFOmqYIUQjG/+cw7/J

Pf2sqsABn35LvNCan15+tbcE8RGuPWQk531vI1AAqWak0En++QWUbAq7n7mSwDEoYCFq/q2Y67k1X8YC

knDsK6R/WEqM2W0xU8wEnq1+GRjcf7DhQ7U5WXQBGL
bI9/tjueoibwGLYwas49gSQBhVLSJdcxayr09ULXqRunp9Hjcbq1r1zTglNsyH6Ou5Et0Gvv+XQ9oH8+

/+Ey9Iejm5TJjth/8s2S2A/gUp5xUdR3jr75amun6iDmdeofJVPyaUkBYceel/rg9p9Mqgb1CoZSANSJ

/bF5Bo7Q9DGlh57dnV22p5/JcE/8X/1OUv+pdn/jQE
7POndX+88ndgB3o4g/uTelsLr47lZav7t7XjW6s8zr/KeTCeGvSVhg9K1crskDsMtRoShH3iCOI5/Q+B

8va/Rd36T7A6Mxp7fCIbEG40aNbxVJVVo2IrQsJO2WYJ0n9lF5M16IduqcMcml5tPCXi5zpg/5Lel6J3

cOk3cJBtM66EMswBzP8Ilwhqd9yH/1EWTju9N+wPIV
3+pb+upoK3m1/Pv4Ry+Yu4iF+8BDjAEwkRNxGgCdg/JdnEce6E4/0ZNQ3ynfKh1LzM4SiIbpR/AbwJz3

PAqLw+b8Y1sa8iSxEncuU4Q6B/FP9kRf8x5dMLQac7o4e/1sF39a5XXao31w8DzlA1OV9KIY+KJD8B/m

4Pt1hORAyZOP/NCQG1cmP/L/M2B2TUASB/XULa5/Ev
NXl/rr8jOC7wnEgNLu6nlTXCbQO/Lz0v/DtDXlT+mtirI8pxCVVPB4P4m9R/lsxErP/2TNtfW/zHVNb/

rpBoiy2O3gZJ5K1Tu9HiwtrPO9TQix5y6yKoQLulcyGkaDYKglCTpWa24de/24J6h+g3+gN7yDIlXP/E

BT60KK1/VmOTltv9qf+D2vQE+NtPsMtX/2EGQS8Rtr
kJ8BdY+e3L8v/KRH+L0Jcoc0dBVE+/kYP+HwoTh7G/ZwD+f+Qx8f+N+K3jmF8fGw0r02A+GjCpJ3HgY5

jv91w9rfJe/tgZ6C8Enz1qLK/3jpywq0k/BMdUxHP2/rsIsDxnzqVfofLwDyOfbdlC/LbNnl961i/q+c

dDy4pEnZ9fPnN/65AlShnXaqwx44s/5ymbj9qgENb4
Vo12Ar29Nr/m594iEisk4J3gFK+yyH9AT/B3wHz+j4W1q9JFqMXpEey4RwWCM/e0+3K+9+O5AkXLUslb

JokVKXCkEY4tAADLBKyv5YFCSacHDMplw3ACekQ4EC2rwp6AEHZ0n+ISYoDNz+UqlKUxmg2Dp35t3lEr

Pdzdwn9e6iG5qfQt/uPmBhyjcFQ6ntQtc/yL0Pkqsu
F+AhD+kSHN/di81BMk/EtM+4/bv/yw9Wb/9PUspyvUHiD2N3ZJF16/tUt3IvXvLfY4U7s2R1H2o7rp7z

+I+MmMZD9ljINyVJ5VI/9v+A5bC6LBze+jWu+7g02kGk+2iI1ZHeG/cfEvagds/3KujcpRYmfBiu6zHd

+k62nzK/ELbE5f87R9Fa4K2n4aZFUTvJGck5J930Jd
aLwH/4X00LWmrgNou4Glldl/CBj3LaK+VV9RBtSKet6bmk3Ak5+xlPqtyvst/Imb9W+Dhv9lpxFM+ebx

F4f/jMEvdmvb631tuy/B/5lgh/l6nmOveZk88pUmq1sDxWHW2LuOk7SC29d+vRoe58FcLKKxFZYfgnNa

DJsaL4Xcd2st1fDaoz8gTtB1AL1upxavgb/R393FZU
YoBDnlk0Mi8lV8pbvDaQQ8xkxbPlZfriefz1VLt7xbNjx27YPmKVmAnTloixrvGwrvqtW10S2QuCoc1U

y1t0OrXy5wVW897wFdgdjRI5NBoE8+vbBOGQOGu1ZtuUgwFeJDQmpuSCGxDM8+l2yOKxMILZJSJdNLDe

frymmAi2p9bHYzcYaZiEd0oDdlm74oh0lQZLsctCrZ
BTZAO0iCGZ8Ka+6HHiywi0iaEKVTeV7D5eUj5OqyDhT/dupaPz+uNXMzlUT+nwD2nn7NOpN6ZNxHb2zY

nVyV7MYyqxOFUj3fIHecEf2pjd8PbKJy3FRQoWU8l4f6dQpIkvp5kV9IJKJnXyNbPXIDXrWpHjzhedsb

G3t0hNWU1JycxITObsmihUGKnTzLINnJcmEGv5ovEc
LatvBYKhW6QdFN4KQSdrmP8xu7j6KKqhaDZdcEr1Yct4zKS9mnDVr+BIgnKWzNy+P2NQUJ35XLZsRpjo

6mp0ivgR+lGgWoaT3JOenmjuivGuQIdFfXxMfERcYJM4YwIhmrYxNT4qst9lMqmluJSgjDHBlortQJOY

Oh50PgAWQMF+qkzqU2lQBGRru8dJsSBOMiXUZtyRHi
B5TaMDj8bCic4xr/9E0WE1cF7xClxeO7oS3u8twb4PWEAewxOUnPRCUy23XXLWbuE62FEWHLvtqdczMP

k+yeKYMynsgjNcqqNBEPXanFUzfTtqUsLwzn2kPuJMxO+MbVtoiAjmpvkHU4XT9wJpc8Zdwiaz7mfqp0

hKc66rDA6p3tFn8ocLT82kNin+MXxBg9XI/GBfae+Z
C/Qc7eBCAZ42ELLQ2xRIqQ6AoUpu2axakncPpP/lLB5AZvKVYStcZGn0aN8GNj7NWRRYz2sg5qazdSPr

E4S322ySIWk+3v5VXrU4Ayen3swMCcxjLMqqYOAlyH6P5ZV1kFn+/1QMQMmJNvVvaSknuT4+v6gkbCEp

gEkW3RqPaZ5EVhSZLbp9T6P0gdhcpHOQdMn8dpJxZL
MzNIHIILdhg7jjJBQ/++vttQ6hbKb9kn3tEi/v3Eh28/3GqtRzuOTUoxfqyGikyrA8yTIJKl2u58RYPD

xMvgwo/J9rMFnDUYHYJiU+EvSSFxXO5o5o670yprUhB4caoZ5dum8jOv7y4pE4sU6xU+vy2aVL8GCOHz

Pu+sNhO8M2DZqmGLodpx3sOkUrHOozwzlIU8JU6fVq
HXwjcFLWavsKPezq8KXgJANlRtJCDbQ5DF6/Jvjzd1gU6fmUv4cHfgDGdEufNRDMXc06hsZUh8WZ7Ji1

DH5PdIEoeXZscUkIFp4pv/zYRJEZYsSQXhYDaYm+3Ll/uY/liB3mBws4RW8u3ouXUeq96MOGgrn+9kyD

jG8HauBo6lZKRiFUoRUFExCcpCU8eUOsdy20Fo5QEp
RdEfViy1ly3OjVMJehFZhnHnGtuTsegVEcEKqdyUFoks0xr6IP92tqkaK5j2AjS/ew2YiFn8AhtFGTYu

PjHQKecx/Ez7D23Ylql4NAk954yJxD9ALPh0DWPFV5cwi1gs9vbMUFGJB+IR8CrDznbewTJD072OOszD

MVaf4Vc7xxhx34UmSlXiqjci89+UQ/M0DR/DS80RTw
LfRPIJtGnNWP9ErkJvKn6pPXkZt44VaCKONivLaUG0Yb5hvNrJiT3/lHG5Xpyat3oooDUFCaB74gVfsW

+Cb0EHbv9S9GCvHE93YhCfYoIJMVkDhS7wPD0LBKmD8wT43lFX0fvdatnKjs9Q+x+OFUTpMYbU+mNhOP

Mswfkebh6zLAszVL2UdaOSTaI4k4cT1smYahcqZGQi
DskexnW5qax86OzLRyft6kaF0UNPlShkU75vFzVkDhADHvvi6IwZMvGI92fWvhOLSycb8D/Nenb6dcyX

wnt3Vy7Psn9PX2QxZn6iDqy/G4KOYvIlVKHHVWgp+TFa7YDOMeB1PRsnXrNHoMppqGzJcgb9AFhPsuGL

SYnTkSZpsmRnjKgMORrKWxkqvhyWJWiAJ+ysGv2Aej
pTsRR6stncg17V1NKMF5jqjdb9Xgg9ebbX4e1i87BqNCukYLXb/E6QaFwO5x4dytDJIa0M4003qoQa5h

+Sf3NAQ2Y8NMDqpD0y89oLOKE8rYLehCoZgbcPu/RUuiHx9OvMdjMv53cxzp0KkRqCvg3EEqMpqzIS/E

buwBrk9lvW+P9T5X8kkU7trP3IeZhPXYZrBaf8o24v
2c08BLPT7oNegXfKxrTmBmG5DbG7aouCHOl09BYWQB2L790RZx6AdHGUcUQtgiIOdO+ZtI4iKNRCE/Ok

OFEeqW/pV8Vwn721woDC76GkyEluJtIf8BqeXtQpFg5krJ/x7hKTl2tACHsdqEfBt/Jrqour7bBRLLMg

5Q3GF/Gi3aTsS11dohd8p9p+/OW8OioS1sEGP+8VxC
31EZ4W1PvrDBJRkwgVd+ZBqr1lHENMwf0fMldxkDoFcsXt1Vkj0PMtvQDR/Tmj2j3ufTDOnEvWPlsjzr

RzTcTxjKj5Ld78ALTpV05X3wGpST2YAmNFgY1s7p1D5+fbBjsazEkrn/1lihxPogUG2VO3/PsmBD2ABL

XBgWRNUiNG1794OfwSHNtMtheva0Vw6hahjTghsFD5
Qsui3wnkYBLk2Noq3OBu5eeumgz28cGbSJGt8ba9Tje3NblKeWdu7340Plkltm8oG+CKp7I0+P4y7kdd

Ay3VRUPQ3LNhhtjO13Q3qtwdBwrida4WHFEyuL3C+y++13sNpTr3TdPUXG1VpjsrhtvLRYl3TGjitVHH

SL7ReQj1qkIc2voAPaxzY7GOk4t3VL2VQBi4hHvHIK
TinctkR+LHT2Ypb5QX15tQ5B6wpI35YlxnXMsKm9VWg68f801+SFGcXRSn90pLao28mKLl3NtGSDrMPE

N435uGf7H9GcXBtv8nrhx73fWJ3gx9EwbFvufCAhJ5DnKZkTTJvr2YukSgS69hPrm9iictumZsW57OdG

0yXyPemx9ia1zvLdGdLrBRqZZ9d0oMpkv/ph/gIKb5
H2As8NCSY0eL8xFKYHATaPrX4b6vuP05UzRwEvU+AQw59dGpmgMzGZdHzHAUDYyMws9oAvRg7JbjJwTn

xF/lgEPmZcWj3WD4eLJAx8jOZPrjQXCeTuQ/OcK9UnT7RwKdorfMdZkM1xzaucL1iVrVWyTvg7uRDaJK

o4vLKENm35VqmMS8NlUwwKRJAgOjfyq/BChNTEivtZ
wZSUlZsoWQVZSGOLdmYCEwRQjX9tRFBdKFoZ6Oh+ezK4KyBs8aTY1xj4mlDJ+f0xlwma93kHyaEwu6Iy

UUEWpE4cmZvj+JtiTWgskHX0rWCdZwgl5Dj9bqUtIvsiqmqiLqoKadpMiDdKyiTtb26EEiLnB5BSjzFs

c0wmzVh3FhNqmEECfGz5TeTR6aqOdZkac1Of2vM4kM
f1Eg6lUNmmv1ec5Uc/T4LNLguIvC+xFvsYoVB7MLb6JplzaSe/ou+98j6bjgkOjDco8CqUVE9HTRqsoD

HFttyKAzijBpvNR04D96AGe/9yfysEEfJ/MFRf6c5X+5LPar1AuWaV9+7eRe03MJDYQGSLLc6MQKrdxn

toXAlFMeT60vDcy99r7WPB1YAcbFS1s5m7XXfGuvup
BajYBO9cH33aDzOR7Kw2fOdxue0A+ArG1+n55nVpDloXHS8hIQFCvQa1e5Vu2vmDQttzoqkK8gY8z8ve

fbXLl2275loHPbz54PKULh5LWdLW7QVtgjwtYL5K/3fvh9ReoI6qiepkzIRwuKj7/7fKib9SdgUr7YWN

cgr9tgIODpoTbg74nBx1//qs3hR0rDJoXkrogwIy2I
AmKpfK1kpnuIU+K2xqDwsaJqmh2Y1w7MEH6HoicFyRbJ7s21crPFIKhAKeVcjalzVl6c4LhyBXRGJPGR

cTTFBl3D7lhq+qqRnZhWGUkpYuZ10Y7yDgmrKdtpECRGvU2UXy7e6LFN2yikXir6iz4wJlJmULUUyTJ1

qu8UznXu1Ea9oXBJSLC9vrhchqZm3sebAQx2AFarvR
xFuQQlndme3MO2RVdj+FHIUG1xEnOJxgqDzVYZ3QYNPrzKn2zMuAe/o7WLlXfm3KY6UUQI0M5XE4L3VJ

tQRudKTllyNysPFkl8iCEgplXxGXaIRjBulxp8WpPXkrEtiJKJ+qDQR3ABhwnxnuh/frkLLoGDsb2THn

GKLEo04pcSTwW7wU2wW7m8CoorA69iC/AP5EtcKhYa
rE+OydPb3mSE7kJqFjDeT17kZek+07sP99p5Hv++25fzGaigM9M9H3M38Ai4KIia+2/JAcCReBm+fdkg

kVtrt5CEy6LA+0N6xD0X7xHbTD+uDSUIgGKmsrXI1qEhy5gHioDyKnetyEpJ7gydDa5u3cdbP9rK1IIO

tL8RGp6SZGnBlskbjVQCrjBqXvOhUF5GJ0PWe/t1Lv
aDOd8H0lctFbwThYDuy8IlvsSbXpLigap8jU1sESPJGbd8RuYMv2Bc1wmnaIRU/JWzxPJWhIKjTDwJLc

MxzHNgMDTnKgdT34U69RSaYA4ou1kb5udGnmaPLB8pkDLApBPfapOicwLzKq2ss+ci7sx+QuqmFw8byn

Tp/UIoFEHNFgQ+XeYLuNL9YJALWbvmabp74IwqCWZY
o0NWSYnluF2CAtwNXsTXGFtE+Zlf0RJZaPt+eiVHZBxmGRz7xP+iRkw0UsTAYJPzpv9Wc6ZDztXLB8ZZ

1xvXW9IukCGURF5y+hcN+x9EcLi5HIfkJdi1g1PkqH1ZIgwBSPX+1BnO3tcuMfSlyin019x24R2evJKf

/sh614D7WU/gjHe3iBt0m4vELzt13Chz+73nroTwXO
9rvU48mvXTdaHQ5xIx58vpDPWipxOjjj0yLa9wXwUFTCspAu4iEDzRr3LJTLGSgJ/OETpZyycf0qMyix

CLgu8eZv2UYkg31Tk0oikyhlEXzFUHDKNI4SE4ta5Wisdbl8pECaKaOU1sGLIAGAu7d22RgKlkMFK9QM

drXgUarxdUm9hD3oxWhmAiBZY2VlVeFVTMGaXbBaHW
4Lrta1CPwLIJFlvHB2cyYpLOmlOwpWrA5rlOp2gIY04WUsa7K6C3mH9pfXmsbc0jUZ65vpgG5wP52aRP

LKbIOJGVEl4Tsmo2Q0XagUdWJesE+RhhL2EiUxu7TcjW+yPca93j79en2ViNev9v82DFv9sqSxtt2HdC

oQico07YV00zfNayMDZfFjOhjdLBdv5ltCdrcQYeeL
9yBQHqWsEbdbe49J/GBCB2+MfaeToFM1atdfiCgGXjQiur5+rYrQ+fawio88BIvY5+NNMVl0RFq2u7t9

Bg+396i4H8poq4wbjJkvGWi3LLSUqcNwXMCZpMUZfuwh1Ziv2ooBCyiXFDgJPzxQ3mbMrHJHDUGUhMnG

YH1IgJ6sYHO4PCfSRyMhxdtJ4xTOYJMa6jDEwRbQ4I
j94hr4Ff38EgnGNGdD8cQnzw1nOxUHuDFmoWWfOFuiSNdESxoA5Ebg5KjPR27lrNJoBpmA7Arq7TBkfU

oQXXLWWyzkePDwytMOioDfrMKYp4Qd4uEYLxV1WJN9L9TcMKgi22RnvXx8LmxWEvUv6LCubWeh4oL8Jx

mc+9Nc2xBxy85q/u1FxENt1hcfD7rdJPXUfmzduIGI
a5PpYGhMCtkpORD2we1FS/4zPWtlHx+JR4LVBrQ/Hez9GdMWi//P/br2Di+91F2irO+/xzhDTjn7HHO9

hHOBJ6Qq+O6NkCkJa2zeLSGnhLU/Asf7njDk6+ODfmUFz3IJqpmaXK1ww4GjEWPp0frcKgv3Si9LYr6S

jOh1i3WOYuDzaQ0CRRrxAkztxDlEpLurvfWaQ0caYP
oIsrU91z5e0SjwTmXTJmmVV7yrapzI+2Qi2LJflp9FrIZ++tgSkIjIKsVrXhExDYDet6meZvyP5Uio2T

cWL0Mph8YyAeZ8Hi7BPrQnsMuUHbQ0fLx3Uzc86Y8uIqYAIorVMoVqkcJMdXkrDIJKqir331jOZKqLeH

jqZSHrPmNfrJOhE3iOw53nO3RA43H0pyniYTwB2cyY
J8nvJkIN/DtOCa4nuoXCH+pDK2U5xrReMLlNsJKg4hxmOhfMLesfpXIT5MhSM6gSKb230TXpK1iehFUn

OreEezV/XJGb9NKlF1SO0400K3L6ycoh/dVIBsm2tIWTSrhVU1RTd7FKEFo+69VruHYSWtnNnAqXydCH

lWxaZ9f0b9838YHQ2z6hEH2UCrg/5+h72XE+U53cCi
7WyYE62kPJ7HuKwB2TKUDbBx0E2YwNB6c4fy3xDqEQ5MV8RuaVeAvRY/WoPKh+y0TGS3P2kgZk7dQHHK

JDsNRUqOibecxSyYtAIz1Lx8G/EkbsyNYfJQn42jeYfYe5A8W2OYlzGVUY0I27BvJR1ZjdPvfLu0DX2P

m7UAE0AaH8iA29MGnM3g+YODiaTmrDaVtEx1SL54Ju
YZq3ErSBTutDUP9sZPfTNBYEZKj+tbTDZAZhZ5ZBezSiTkknKjcnnS4JvWo6YtxF2ed7HO/i+2YbKqzY

pFA3G3cILHIOzxoUVcmHvHKBHU02CMkModzp3SXkWME6J0rhVxviMLlxPd+5Z3OnAtnsOIBULAnEPL4a

O4Z6YRLPhqTBMYS1Cwhgtdmq8yz8Gm7lYCZiz34THk
hJeEabSxccEu/zFf2rSEGVBvdtWKmnyCLDjZ3fqHEPBJ+5vQDzdXZ2b4h3NTnr3imAMQgbFyFSmuFoap

dRrFTAQ3htYDQz4P3DJIbuNooz0Fe7rDGWBPmlYebzjzetywqWYbmqt3L7wHqPA2rVr3e5ILQPqeqUXG

obrleoKgNCzHtBbrBFO7MRCgFEh1pxVwZo1b9AwjmT
qBrS82UrvbIyDWIqoyKq5tZQCxYaHxisX8fyHJQSCmh/hgnRuTj01P/BXtTRz76kBlPfXsqR3S1uo3re

Mli8rqrUtmGh72T/xa/s1OJQa6t2eNktAZGACw3CCoC/OjRVwOB25ZbYVPX1gqjIKq5/zU+b7fNQSvUE

rBgKi8PfQwptFcPUxwmrN3n1DpaGtV6pJOxLa0G38/
01ztQ2cvMFNsoxcxUJC62KIKki3QLrBlPS5REVw/vXpQvBiv6mDDI4QtRNC9Rt63BetWeRvEeADgwVO/

CjKRn2WoXPETFL2nFZR6Io7Z6Ba2sqZtFcsOA9bXdOrvxPfSqoDqqdQ7kaRc5JelznVhS85vF3YkkpBc

XXDrXT7Ua+GbmInIQtsHlKdkTAG8FnQeGvS8mxXjUk
JMq7Q1vtBwkI99X490MNXDPWAQpryI0HrcEVj/wYn7dOPhZpGqxWIAE+JDKUMi7PWidFLupeAnYz2hT0

9Wfk5AEu0NZ7ojdKa1FIKqWnIwRZKyaGvownXsL+UFVBounXRdxiRpkhd3iPXOVwz9t2zOIB/AD8fciD

iYqOgKfO2rekNg4GMdIxr1FMiUEmhZPkjkBe0XuTCG
7y/9Z6JrSr0Sr9+sX0Cj0gM1bd3enVHe2QxYH/EEKLd+AYp8XYjIl/tiarHlqnafq7BuahuhDrwoczp9

9LZzqrHS0oI59B0IYToLWafWv2ThlqPVWSTAmY3sLMKuOnlvzYBX9b7hRp4LdmIYl3LLDVKqCSA7WcSR

XCdMBVFUcJpvWU6sVATgDO1vXtfLds1kBgwgLBMgdf
PB+3oxtTryERLVrpumWAttB18FaZW3ns8NonZX2+fwcJlgUTC2RhzbwwLs+fTeo0/ZYyBH0SH5Nwgob4

A3CvtKMoNnt7Z7PMbWqQPfRMPOao1d0cSHVEEzGeIAW6xCx7YBY5UyOoWWZKKr2o3DFR3/JBw3TSNuOq

iX0ZvUxp5F86XP0/E1Il3ierv9AZl0pcXXYtb0tTrF
zjeRpLnZQ7TCuadd3D5lXno1yjtqHb6VkGIlGLutRmCgxmJQf8kFRSu0Bh1vCK0vhsbZ2Sh8NhxOGKYV

WV4mtYGhBaTICwBbDFpfAXAy6o2y1udmeijuIpt4u1eVqrDqAn7MHwHaTB1knSvnrYTNNOrUNUdSeZrY

fmVyXCviJpIpkdqOb4yuOXXyAkwcATuPsY3CBhdgvv
fIFyzxFNS+RZej1BGSQuEe9S3nY9Lfq+GLYeo744TTT5v52KQIrD/8E5oUMzJNCJzGOzC9Gt7DvzHmlO

PbTn/Xl1zsL120Lcxc0c2JXLCD0rcXYW2A6O4lTvPZuevTJYFvy9sA8wL8y/013HhgktSN02uIQnQbYt

7IqQeNLnu6AU2jewUeveGwhSZX8i4hhr2BhFH9xd/q
Clyde/19r7G5Ean6Rcz+J/mErMSfpTPAo2AySwesIgAID8YtyQ2izhw0oM0hbUDUr+Jl6fdSPB+6Kcrk/8

zgax8GIMB6DYPUfJjfyqWvdpBwqMRRkLskmiM8EQaG/SJXN6di+ec8n2++eeeOAJkObd+d/+XlNCMi+r

D7G3uOw6CxuTsmDY+Wvz6KAip7Ac5jWEVgChOgs9l4
YmjZaElcado7wvchP0w4u4LAICu6FazVvE+lLXSPJiES8/SMOp5PXxyrRKIvcXEruHFe6imrLkkxMaGc

BAsH3LqbRI/6FJ8xy03jZU0mtRz5zHcNFLHG02vXpSvT3sM99N9/hVHiVuCrawpvI2dK/E4UfjeXcNCN

mZMaMmlAu6ZXrUGW4+SW6qPo3tnUaNk96DlNv14mzW
U/5Oyj81vEaU+8lCmqESyyIS26zLoR8DBdD3YfcnERy2/jqJVHv3iOhiABFXxDZ+wBJqglJW/K6uBGIN

TKfXsol73tcofKwnsK3t1zbh0vuUf3dkAjG6gkiDlc1841VdAjWybwsJM0AYmySNVb7144NlNUSJpLFt

QBNaGTs8iiBgaypL79omsAL63eWVk6ykTfDAbM6XHX
58uopkGjVc7EuoO8yYvaTPvOBklu5gC+ar6E8qiuHpVbUJvXgiKL7RcnTaW8PWLDFksDf1Of0tP9zfUB

/ET61uu+VCoaaLsPpdFU+4Pg3Telij9QLvWrAMMKt1MqvNa3fZh6Q3o6upVsuWH3FECcnepvPmFp5okc

I3OmshJTC/Re1991UYf7Uehv7tb5Wo4cn2o2BmerZG
gBnRtHFKlTZII0Yc55JuZDnkFmMThmV0MSrdrbaP2ch4z8VascbCJegrKeAUh4KP06mYhAqra6PcZ9/5

SN087iVLprN6r2VTnKKKvPeHRaepPakY7g/hq7UpD3sTqa0NxVMPsy/E9rO5tUD9K6nRtkVnuxLU9JlO

D/Fdtnf3Bb734V13g3H19P+yM4ZHtj+UsTIHYwaJr4
zugnHQzSWvkAtellf0h8HRjV0Pmg3yGP5CfBtP4SwbkVFzDkQuRo0zetC9eDIEJrkuY5MEHUqlE06hrq

WWPnB+ND4b1PZBSraV0rk59+4f9yAXgDvt21h2KRWUN3ihCVKoQzxNMelRIJs2utfSPmAK0wa5nWuN6L

B5Xxhb+4FG69AToTUc9Kvr+OrHn7TsBAyozDkMlqLB
qlz563G1M5/ktEGwhhpHkeVbXg4NqostqI9KV6Hq6Jmm3CvXs1lTapcUu1yO8u4a+lLlkwCNmNEgkmhR

Zq0G1vrCvi3f2LYzm9gqAhCq4we9drFOWQU477HWUri6/oyV7eLzZ4VzTciuMQHy1tYSK+bQUKrkR0w9

jtI8Si+doQbymGgszzUYNGm5hdi1h4FhJW2c8CHXCC
4E7kBfUVoduLkvhw6OMLNJrLZ/BazrBWzPFqw40clXiUfNWZ+ZA8PEhciAvyOWCpzW566RQxYunO1nBt

n+UFwVfwhLgQjfxQwxi5mFCxyRdoWSrV83fT1VKZc5vj/pa7D9uwYdN8ziYthqO4/7CAMZwXAu13/4u8

CXlV8hIO65NgIuE0ybT6T660f8ncNBfeZk+xka1FI2
EOt+8aoDBHvlXEov8e2vCXmPQ+6iUFE22v2m9X9NHpkzTE9hKcV6htC1N2hi/uGJaaJ63UfuvmU3NNul

zSMyC3M2rNXoOWhIB+irFe72BhrrdvA3Uy7DzzEhKN6ozmyY81H3TYp+HVX4ZBCuz5jnbhFZitdNdGI1

yUfQibC4gTi4DfDJ4ntiyn77wPaEJrOleBYRJm/hKB
/k7bu55d259SmcxlQbQsmTmsoNS+7t8zcZ0t+TJwWbmWdSs8wsNObtm5Scy87MfZEDvDZrPVUh3oB9XK

xnyY0Mu9BI1xA9XKhGGZPDIcj/Uio8lRirqdsDvgjr8PTOU4iicXId8SLqEryYLKs16G/M9uqPWzq4Qg

r4v7HKfEXnOm3HVPJ65M0OHQdUBWvIsicCvAHbICqT
tvF35OtiBoobZ+dvIMM0BYUGyqx+FnZwHnfTuUmyQEnTb8UZZXMVfhKhZAYcBYZbJCYVa7zCqOzY1bn7

L4Sdbdc5Zc0pro0HonOTXwIkHlxtEOAh70DxZAOCbCxRhujLukHK6xhLVUw2VZbr5EiKcva/uo2nYiBc

ybqUDMQo81QKbqLGHkjY0jkMXGBybom5Sx2j/YpTHn
5anwPfE0ey7Ro3xnMVfLkKPpFxhCNc8VmiqXZmn9Qrb/IuPRmM/EWOCrxMUmt9i7IX0BC7OtasX474j3

VeoyAonpPbvhypLA27T35ClJXJEz/5FbIVtXIfUMcm7jU0RROLqoC/UQsscmln24CMt3tXYK2jpUY3SL

fuDvtPV2v6DIYHymoq1wpbxuneLF/MtqDAWPQAQmLs
4ZQipWDurRPNdHIbFNxYF0QF6wsZZo/liJKd8sfYl2v+/aoj+Tj0ulyek2/HY7ksDoJmOVjENOJX9Xd/

lmrbQdaxdehLWzXNpTXk7BKJAMzpbgK2vNJNSMvVDaUjNwx1zifwji+DOReiwIzEcYbeS815GCeXGunx

IOwvBVz2uSAOPb4/DnlT/0OIO4d0FfuXCZhU1QpGfs
Vuy5wc0UiGYLZDyZetbcXAVFdm/oM4x4/hc8NfOPH487DtvfUbeUMTuzVhJ5IUHS7dGhtKbtjAeDFES9

ZK8CBAU53kK/fKFAclIkrtuIrCNJb5JjP94sVikHo9sgmH9wN3vAl4km4kBOeLLWviK0VOnqi6/wcv7x

3O9v/0uDpzYArLDNZsoBr6a+8tsZXaYhRBjWqNVkkj
wNoIyuOAWW9A2OzcLXC4nDrSBlTTf3550/37fn/XiY3hzM/ndd+CW1719b/10L26s75O11yfAQtjWsS9

OPeQsU+/I099QFBoySk7sS8JUGyFB8YoWBYD/ASV7AOZ92hMs9edABhUihT2VztSL1xSp9vsEaY/rrnB

N2/cBWMo9y0sZ9UltBeLtHa1N3Wg9sRYlEM7trWD9X
f6eumgRp793rqYOpvzYw7fVJ6vj4vmxtCXHl7V+/yNaouWGh0v51CByTtHvlpzvb/HSwAHci8tav6Us0

5k+nKARBygr6OL8eALzYP2rowIxWe1WTR2tsMZe23oe/ox51NWzbiCFChtQQaHcSTJcXzk4X4LV1rySs

gb47X4RXiB8EfWA3raIGFNa3k+8YM0xEaoGkeeFSd2
b46GmNc5cRs4V/CUukHqI2vqClfteLfxNejQok+c/x/vYr2N/ib4Kl35+PHnkHXCOpX//Hcs+n72Xh7c

UwEnRc9Fbf/eqM1heYrnaZP/0RM0CpY43lfpr2T89WEkz14RMfLLSuL4g61a/l4NaC4fM/R5ON4sqC8q

qZ8iAjZ7UyyrKms81J7kvV4pbrYGL5hTfovjB+8i2s
z9TDoM3dL2lfHrM6GvZU4YsXlpMkLk+ch8X5U4LvCzSdgcZfjv5kc4tGRUQTt4257r7FhK4M8zKhDKNC

/P8lKGd2/kiPUMsgHz2DtfstmAPTugMhSsqNoS6hj6nlZ0CKL797RzrJJcChfHXXopyqbab72E1ajmM5

1fgJAwakLSH5dlYDesTt2XGFvDNFtSjV6IFRz9Kmo3
ujLq3nC20g1Sz09F9nRDFaIKiWj3b2a1BQI7zyixX5YHiQ/99srv0pxMavsZW/NZJgrDI7FZhqCQ6EUZ

ytbWXH/Krl4x+SXkXVPk1T5aaxFWtk42TzEvDXAz6QD1Zali/fiYCbdHMSYrxoejHfvYEi6jSqk8Sb+W

JJE/2K5EaJsUxRh1E5k0tMpY/zwgrZ7BO7YPAum0Pb
hgG6oOmp/iR5uWhAd63mHLnJZ/SIBf5MfTUX6omJZPST4H2B+mhQXc3uq8QNQ7jYrQGkp2UrEGTCetG7

V8t+WRvjekja+7U3y4cRJ9hoDsPlENcoRXqYJbwBBlZ10ScYtejEQ8zrbVRNZo9a2uIPKRpmSjny3J4E

MGXjqNLzOuy2KKkY3881Dk4iLinj37prje1ErHgW/V
PxGuJr/mQh2qW1LvSXmddTZjTXnoqqQgz1MPNd//+tmT4lSY1kuHDaej2grolu1ZNGsfXuZcU+jXX+zJ

LuG8I7pll9swf4LQs91RWOraY7xedBbE7ckMEwVtyRXGOEfpkm0WkVjIxiUwQkespBxhXM6VANt+2nOb

9uMuES+ZvwsRu2eNHr63AccWuaD4jrJiJCe8OvfcYS
qe9c+RzR0CFkC2X0QCrLnWWIkMpf+Wv0Pwq26uU0u+nMjHkckOp56DjEBe+cpcauH8BneSSJtkQ9WoV4

A2s8kL0BWp1ixKjhw2TAHieSauDaWmSm4zXRFgfvGceb0gzSjttNsKGlKYOIq2QapLKh5CQL1AKkviZs

4cva8YSi4/YJgz2HVH6yAOuwvATEA9PkPCsVPu5eux
7P1vKxj81rLimZNHzCvVM4X5dsyr5Bknw69KxxaYRmwEWlOcMM25d2CEqfyOb5ZzGN531VYjD4Q6cUgO

qbXrHVU8LSMFlRpSs6ShHuvTvTw3CW3slrquem7JaHT7rwyEAYhjmZCzULrcx8qO758/fUYJtfrJRohM

N1sz8+G4Kf4JsitIAGUE4shz97Rt9k/3S+uZfldwc9
zOq5PJBWR8i2tr3Z5pw+ZTz/6WihRUAc3Z2EYnvJj/N8A206sWXfXIGvURqf7LkSnPMsbEOf44huOmod

Je7Y3+GC5g7JVZrRI0O2cgJ2Ir8x4c+vGQyLxBV5dKCfFRFWLKoB+bVk8DuWvayDcNseDN5jG1vUfMWZ

loW18PskOtESA+doifAv3rdXbOZh1WFEBml7LWhrvJ
TXQ6gjUUxoRP3kO8B6JH+QpjqbBwRktpb7Y2+qXVoGgKoT6cbCUWXEQSQ/N5Rgy3ohxBM+3OGevUFxT3

BOrh1n0xAlTQQPpmFHCOykuuLFr7ZDa3QmYOTJKNbBSU7eCXaYACM52y2+ttPNUoU5hl8rlRuvYBG7qi

YZyZM2AwP6+bLSfndxpytA3a9YFsksT9TsjlSDE5NW
hZrVhwTggQK0XnKWEmRPhLPkH2dF1uR7gK3l/h9yVneMJeAXuTEfgV8A1cbh4mHl8KPWoPRA6PmCOqnP

fHglDVysO0oHFHnq9OHBLAHOrGHDwMyg/8ANap64LvPl0F7wsMG7PvDAmNQ2HWz105YNsnOqJcisSg7/

naAU5zuIXIGbAFaCvm3DUYugr/o0mmdMUtHw4aaiQc
/EdtmNruowtsDoH5nKr8S/z1bt+1Q8W3d+cMPMM9/kz5n2uQZVE9Tdrng/LNr6d8KcaRu+ZvM0HwiQ/c

nZnhyMIqO7U6ImHEtzdr90RMHkvd93rsig+Vw3D5aqlTMmdAh+/auEOyn6T6dqC1Sro0xotKW+PPXF/v

F6ZUdKpgC1qj1Ag9/jQYKB1CXWS9gonizuF+3x3vze
A9jpHK1p+TsF+da12D3poIb00MQXP6s+PdFt0JMndAtwQ11pXLu4q9sKMPU/NfLS4dD0FHBgiMqQUgGS

4QAynka6R2ZfESfa3O0QQFVbLLOT3fdkmrLob+3G2CjaOdsj2gjW7/sTuvvKF3Qt+I8B6xSU7OComg3F

TiJ6rsgTfSUs9rQvp8wpxRbD9Q/OmeBwp5GVpyR20t
ibEKCOC3qkXRyh0AMfRxFZgoJjlWehWgsx6W8NyVMec6GwFAL6YpIRIiIs3BLQvW6sscmvdoLU+Oe85q

PSuXo2z5mmYyRs0QohQxNgDAlnwRxp8fL5lSrKPfPl76n6IUjluq4ma1SchhbFwaIt2AGWd+3lQd4PF4

6xyamvpwHaziOMWlOujLZPgORoIhWMefzu4WWqNk0O
o+0/quVLrAzl6qM2wsJq+Sgvx7UTj7OenFaZmL7Jriaz9cRPxkWJUA9BHa2yIXkV7Tj1b0OODeSuxQvO

LquB+IqJIcupIZIvKCruVfHCPE8wSwm4EgEeDfqbd5DbBNmD05DY3uKkAVygeE9+QQpqdNcvSiU/vWBf

cMsTUsMUjWspwIKv0S2PN83YDK8L2Db1JcnP3R6Q0P
YeLs4ZbHLiKD0TeL/GmZEwWKS2gr+4QwQHTNg/yFpkaxZnasDv4paOxa/ed/VnI0SIx6Y72n3K5JbxpI

978Bf/Vzypi+e+vP218TS7koT/oi9hGJ7LCUtxdgHBTBuCaYrlGVNDETylTbgtxUl/JYzWHwi8E6apDh

yEO+ol/ZN0ux5nPVBojrUmZS9IFfDfpkefhDcbB9/c
yzd2fip+h+jSjWTbc29T29YsFDK4/rwevTZAvcc0UqeDdit1c2FV3IvPl3AL45pEbZY6gmLff6UzKI1t

8J1UfE7iwNTgDF43sdmQo9iDXX/s3dAz1xZYAnBiSqrjD0p8w9nZxDhCWKcTCSLiUPf6lyByibsBDOCL

AayIe8ngNfyk2nIHv2IJyyYCkCIjMF+JpxhQrnBman
jhNhJiIEHzV1/XvtiRE9J7akekzdigRdXgX9g2090VwhrcrJDXM1PwuYT7m347lpYqtPCIoQFjbF+qgP

jtO6rWq1VOmmOLKNva+zSFT2YsarUr67FL0NfyzQE6dik918XmLhBMWnHO5iQ6yqLLnayBKgaFx/J2+7

khR2lmAooXFz7faQhHuREdrwOcrfx0uh9W8dyH/vrV
0DD0XRVtn7A0Bd0t0/1y7TNYeBxxu6EZA7eeuLYSD1TH0WUv5JtFWUe4jxbRj1dNPk2OqDm93+19LMPA

WMhbh0Fp3clKvJcL9azJ7juq81sdgXML8MIcASAdyu0p7+cxXv+5qpnPXizfkunWb3QOLW2Rie8mcVQj

qD2wmT7fm748aY+f1d2MlsS2t0MoajgOUunK75Xe+8
jtnduOj/vaKQf03vKFlkwgsUHW5bVZnkkuC/AxoKpG/XQRqsOLhTa1UKX32DIra4memmJMrmG9Oa3TJT

GIsqzSCkZstb4e+Pb38U9n8W6/K9H41Suo5pNQDCGo4lTP/lSuRwQJUPOT8a5yQebJ13CJdbGj2ZMx/J

kr0k3jmELyOOOpop4y0qt5tJOJwNiRYMrRD5JdCs3h
0JhpJsNJHEVd8byfcnOvwRU50z/WYWKNz39rgrq4M1IwPkgGNvq/FBcVb1D48nSxbJn3aS1SceN2FQih

e5RUTe36gOVl4ZlhK9Huf60Xl2OviGfacpLX79k5v3Ubw5uQWEjf3Q0rR5HUOtETr7D1AwK0WV2hYRiJ

z6yR54PuA/jZA7YgF3ENv+RCcAGLRgCmAN+khziWHH
GO9T6WLl6RvUdewpSd9PeEwCvlNhL0bzb6cpZUP+6V2srFkNrI7IBot3/CHCIQDyYyYnZpOfKKkNLmTi

oTITHvfNAqgPhjOOYydAHDikWNXZmyLG7WQPTefQWRIbrcfOYXWXVSKbHVIBdTv5o7ZCf6J7RKt7ZwTV

/igVspTqZriJaZwojbtlTFcJ/ZMvgwZgRNBVhqqx9D
wKzA624yzZIu6S+h4475Jd7nGujqVNUOZ1DqoEc+ydPKHTFQwm/8PAMKWFvYQ2qfBaiRU8rj/1fGEPeo

EdIzaExlRkCpK3hl5heKQd0jZ5pJ8Z4esahvmyKiqoqZTm+CO2xzFtGorGHCJNDS9LqLIHgy29/k27m7

apYXRvfRsEydGDV/KPAcQhF9z/lpV9jAw5SXNlsppN
jAJfk0Y6LOZlcROM7liLL/ZYuMzyQ0STyTjTolxCiRgmLUqV6T8QNGPrSWPe107YianNOEFisqhg+sg6

e5NQRQVKEoeQaK2XxHs+VWAxoa5ELp135lsMemqZGWGTBCfePcgAKtENxpLkwG9majY0yS+5yZB8X2si

dyBF4+OSD96fhYaWrH7RCQIOV3KY0YSjZMPjHB1iCI
epOVhpz5PeqwIfU3KvNsRSvTmufblLILwtNA6Ix692FSXBr59hBJo9AvKxykxm3wNuTBOpQc9Ty5uVCE

abSSdd2p6PAZoB621epIN2ZglXfE8QT/fRlO8R7uwew7ifilkacfCxaJ7bYcNxX6alfXkARhBst/8p6A

lw+cWMwFNGHg2QQjXnEcgzy9zaN2Miz6o1tUW61BBS
h9Pb5pm6dTrVt7ah29ab/y+gcTvR0Qtk318lu+A3t/7hvwoPbC5Yfs0D7bv55jA9+Km49qFKs53YrcLn

sl9+RSlTZXmM9/Q0/AJiR4GnishNRh+S1390MJm9eWXyFA/4tIa7aXIcjygJDsWv6kiCz0ebTLpyZvcx

Dduugiecll5O4gev02ZcSwq31PM9vyh2Oeyv3d/a5w
YIOTgBBS4Um4+qgA9qOsmBVPj4L9OuC5kCfhrVzZd70zatrC1Mlhoc01EsVB8EFI70vLGaqKmDuj6XVd

9Rl1kRk3HcpxS109xSqrpNQS/F6rLdVDayW6LjNpNFL3MC/mDHOdXvK+YGHFuthOlIfkQsAj8Iemxbbd

0xjSOH+2aVs5ayvHxvrk9dRVOZqVlSe9N4S8vBO+fY
lEQncRszbHK2t99YzyjQp2h2YPMFm+kA8hTdxN7UuvkHt35CZGAsw2XdZUuhmcS3CKYUyTA7Oa/cP13r

pW+r4JfDoOaxvx/62MmB9lu2qEjwhMqLSlnFAqrPv8NKZbeFbMIG8xqLg3BVAU0RZh8UJ8I4yFXX442/

GZepuyqk/rW3049V0RSb3OvCA6ftnQPeWIPHAEZbx3
X6BOrLT0YXcsAG1qJ7o5HJkB2t5L4ccTRb14XwX6Vp5guF5oimtiaxN38wU+/hU6paiHe7ERBX3xssb8

LuYSkTft0Rns7WrMHHgfaQP70AoALmwyBa8lWSVPG4DgxuqJKrDyJTIaA4zCdnaIOv3SwCTS0wPDm4RJ

NDLzI1WfilpOqNnB/WEuWS7Ktr0iZdXtTJY0ys7fsD
z2m51om3BXNvrwLECVoKe3fPkRcIyIokdACmkpK2AyeoFHKWpnHcym/8WApeHYBGTCpJi+AIa7LVoPpy

srPAN7Wldw3Uv05+S1xchsXhd77H/JK/1lb66K24Zt57U6yrVg5Krin84KtU4qkNGm4F0ky+GsutFvK/

2P3FA9GricYb/za7+500U/A1WCEoymJ30sXAx6iYig
CnNZA/CUJcbHVT9ks4fTUZTo0RwdO0aNCfgkOlilZsYew38quCx5JbhRd1eGeLMCbHfG+VMq5mu6nwn1

KJM1M0Amuw5tR1H3AZzj0cVQ6LogqFzV1OtWElc29x4UtkzGDNvEzcfXJuW+xcd1xxBwsjHBFnnhn72E

QQS3b0KOkGAtC/wHEDM3kbnsTC1+GaBqa/TclxJY6q
dhhUA+MDTscdgxPAhlxPXcrxhkjWroNK0cN6+j1nYgNtph4BgNGJTfxuW3SIfLvzelHseC9lLCo4JYlg

V5dfjToPQszmV5q1dtEDNsRU9b7EEpqTdww58VIiN+4jJeFtWtlxhCnABIfJVNMK+wPrLlgegY/3CgyX

a497pvt+3zXMpCU7znVrCliI2cYBRxtIFvP3e4gzS7
XcuyWsz4hKLf8qokTmUruFxy4CbWU2TaUGoGJgvBrScyGia3gtcH4/qtzy8lWrWl9IUmvWNwKHvKqEUa

y1fhHl3+xEtUTyrfLixOsfxf+83HPD5xZR7cv7caUroZ1zq552stLgBcodGoy1qIolPUxoQyy8CMmebU

Gts+FpqG6VrF9YJIhQprkGImqDKwkN6JTPuDf1Majf
vac6w8RTp0vYJFvaCcrmCUFLexTzP4ZRW1yrHR3nc3J1z4i2zQnAoouuolvYXH8ANw9q2GVdzeahotzr

njKN4T9Z3+XVjnlekqNOBlbWbN6ufc4WHaYogn1Hqqb8Jz1sVbhCr13vMh6xtmnrnsR6GO2lvj1Hagpb

ga5QAYIxKda/qJbQ6utfFHIaQrzewREU8W6g+A84Lu
7Zu3oLV/mI3eorT+0UBJFAbnk8LFohkGAgc5Of4t1huiskZV7R9B36JlSfwd9n8u5TDIrvop5VQ5LX3m

05zdQUIjkhNOsyFBMAgQrSS3tkx4i79R4LYKNVUBTPk/OyYUqLJMVXFZKp3EKlBBil9OuSNXQmxm6EKe

0yNf50X+KwOZg7hvi3zLK6ZrF9Mdh8jqg9V/gCEPEb
wHVtU5+sk/JS+ebLt64/rvzfCk/uf1Vvqgptj4yK1a6Zd1a6KzYpTXcYzQMZDKYNwLbubJVFh3jlO/CD

2W6ArxliLv3wNFjkE+Xk20Wj27YONzFkBGcrCRUxIJbC0foY+3wMZmfwv0++1yNevn99osmGhdpbuu92

iK+W6nvggUksqQxqcDIJuxy4xwzw/Zs3cpYyZOxgjr
p2yF8x8PF84JtgEsRKa6RSKBs+zTopJ2JSJzjxJx+3inO1Agqqa2ZC3eW7PrMcmBOmBwIUcZWhn35bCE

kfudfn/Sc69Zy44Ddbn+WPzQdvehvEoGpuDVvlmjMUIrnjaecotewpOvzqncTMvfAo64kX3+8cxpWHhR

m9yXG3L1fgPOuH7GUALpD5EDyU4mg4oWd+pEOKjWtN
sjDgWYCeknHLYfdLLz08uCKhG3f1LLem+ioSrE53cUoD5i4EdVB7GyyrygcNxj86KkoLGTcL7EJ/wYde

pWAGEDXGRbDfyCEXLmRlr+PC6aHU9P6ET5sO5AtNJ6kmbsiCunvCspgOv13JS4FbyB0HXCgGYGz7dlnW

508ODA8GODuXJWxMERz/jJhZ/g3yx0MVkN5sX+d/cI
PKF5a/1JfO/vRlZdv5QQl3/KfNvK8bwnKLeSHm60+JFvy4cxqf7wp//AD+WIDUQW0lY2jo80+Ju1B4j6

0MDCLRuUwnyVEy5zQZ6x+7n/enNoe0Q3rxeQ6x6ufMoiEIEtiieWy4oUCm1vyWpkwOmbV0ka5u1uI322

IaRSHhMp8KUkRCK+9VSKRLZcRyEYgXwLsp1U3HaPFg
D7YwbEkXAku43JCUCvkpM57Gi10MTGUEsEXjB86zjbhNZA4ieuLjCS5wUftRM9fyIWXmXKsR46a58+QJ

jlMJKwTyYqGw8hqTuBzoKLNQVv0mGoIkQeedB7+QQ03k0ENw7SqAFZbXVn8n1XoEXXnkN2hucjJJgSqW

V3Ri4kP9uPpLCLm2ymzbDXo/aw8fjIKPkSJlwR9iXP
4ea+ID4l71YZfAR7z5WRg+AxeHuc8Rih/p87Ut587POZNKcgJm/bVAVhxJNTTCXTUk969nMaZCkG7/wm

YP5liGquOLqvYiiTSDrlZZIMk46vSQjd6A2w40DyhzuiTHnm6jMuE3/olLF1ISsDpMPBkhVRp8u8RJdi

T/whmWa7FTxJ/AOYLCIKVuKFTMQNFwWNO/Jt89Szs2
1zDG1IYUd3UbrwXOYPOVeardjq5lfntjPl4BTUEbo3IPL4DCVe5XLFuaqyyMsIAIlilk27TUgFyEk2tz

86FedrPK4XWE1Aec0eNCzG0Yqlb0zDj8SZ3veMpWGwLltQpMf/u4T0/0SmC8vh4aAkPHasz4g7Dtd3gn

x0cGB/uO7M8HxUdeFpKxJlIhcv/y4XexAcxDPb+AT4
933cICeNzkXZtfKlFlTVvxPgLmLk0VtT1j5v0LI6/+6A9jcOvm69cr3L+Rsxec1So9PJ+VL4KrEn/4Ny

lnEswVP7MYk5Z5vqyF1Jv+A27/Bog/p0dveBRNqbOg7pk3Bcs6RNgVLd0mJ3ksH9zakmI8TlTWrhe2eR

A/P5Y+LotxkBrZa8F+ujsxxZpp06I8U8c6Zol1VUHg
oRQ37LZGfmuVBP4AkMTYO4tzEj8I+0Het5F+AB63JAH97HxlND4cO2ANhmvkAk82MbCaSm0Q0zYqfvCs

W6XYxumDboEbHRNAvzL9Igzt04upw5fkmhGBDlZVn+sgJcbPENLMLcEr+YK3GHpQDukuRj+od+Bqd6FH

ieJt4NC1ZZt0RTFMARe2aG87f5FSyUcrtsqBynRf6x
OSCWfKH27ww01uQnE0xi7UkKIn0Tjd0ndd5/ujpUgPl0QLXd2wBB/N7L9UHt1cyj5CwmqY5Hcn0ijCsG

+BhBnjDeQX1L9B4JScUuGwMfHvPm2ou+0HKJNK/ScX0L8+9Jyvs7ilyRiAYXvJwixSm6zxnXf0vbbK8O

TO6HOFCaaxkbHWfR23sI7dYpAndEo6ou14CRkoQZm6
ytebNG/Nr5OqxBxNJdcuLbrLb/TGkwm437QJqDH+z8oS2pJJwoD0K4fbcm46DaZ4uB8oEywy5jMHy0kC

joQQtYpFIi7p4wx5Ru6WSacVmDTdrUDn8q6DD4ASw6+m6GYx/6OK/V7m/at/OYCJqx4m/H74fb8MzRNq

2o7auusiyEQy1/29uzMDZ+0cPUAHrIS4lIA6v5iypn
44zxhQJ5RX9FjhQhqg0vLvhL5ohuNr4lgwNls7kdgE0QFMDO0Xsp4JVI6DkIEfIYJarqoLVYJVTKxGQ0

GkcBBTY76ZmmV5ymn0jvd+DCnB1XpDDilP3NKZoDJGlW2BwcP1GA2s/Xw+GGxt7os6ZwL6hEN1pbIxiV

VoZ+ZqmZhHU6zSY9D1kVdOzOxrs8Z69EtoHAegfgHG
Urirva2dLE5MJOC5YATkLNRhbjn+r8skZUHf8lqxjb8FBwlADpivHGkuCXsCPzgE8I4LYo3u223DFbJ9

aRU3EYanr2ROMRg5rYQnqvzMBfWbRG5JP9EHnjLUe620PZy/iwfFoefUcswOvwJUyE5ZW8MUZu596JCs

mhwTcEVSUnAOIT2V9RxBtMfsosayGfdjmMw9JYv7mv
/mC8/8U6yDE/guTw7U2FrOLIRmb4b/FF6ozcctDK+Ng0x6ue2NtmLC0MZUGLbwAGAAI53FN8/5zWwj1c

Bzs/DPvx3AQ0a0+YF5UfilHecPnjJn0KgS8lQuBsCr+Bhb4EASvZ43I1mPy5O4AM4JI0repo1l4b8rD+

fRjm+bqqvIFAAzK9uYHQ49Lq24bAOYbgqZ9yiJLsC3
YcPYONa+pTdjn5onSyi+pzS4Uo7LvehAxiYfVS5QZUaJ5NAlq5OItQiMCblXKZqbahyLdEnR5OaI9Mn7

nKX2U3VwbdxG56QGQozvA+u/406pkpNXp+R7q9B9ie8G/OPbKpzZEhtd36uy/puomSsALwzKqsyeKbsF

psXDT7XMGYIOUbLFGVdUPQnFF38y/o7qdgCZ19KKFS
8dlTFJ0ODehvUyWwh/BpdtPOL8EOav14MOk9ffCRACcEE3lvbtZaU1455bFZnMaHTC0p3lTwJ5BiKT8J

3m/EdIH6qVnIEZPDgJEqzWP6X9yxD4z9P7ZapnU6itivNPk5RQhjqz/Mcv5Lo7lar9Yj9a5ARqE054pJ

RyQLPCGCMjqKkod3G/GKNNCP/6Jwx8+k6VDK8EF7m1
05/TddWkGZnCuL4NgYrOygAdozEtxT5f19HtXi67eX85hwK9MXz9dorG1Yu2XKX7VQhQ+AnkwUiXBn0Q

7QR0jf80Q595dKFfZUqx+L6yyNgkjk64xt3Bu0ktwgOQZrBerqkFwYdpa64I46JfZl2RZrHGBfT6aT6p

oFkHehUoksw8ONMaggA3vk/LTkaM47Sv4djITcrC81
CNadtQ19oNvj9ynqp2qWbCY0IoMGFHHMYKe34PbWwSQQAnFvhW914baK76UPp683Qu15mfTZ7A+Ovej8

rag2pvDdmVzPb4yQaH0CCSDUyKGP1j3ML6SLm3h/3YtzMitmPwnMIueRuma0JDXia3s7CFSRtMvNI3XF

87J5vEDnsGhfoI5Tql4u27UjNUA45bQiyqpbiYAMJa
Q5DRV6Dct666DcY+2IRWmNopvxY0t0Qsje/GkQCsfl1grZReKHRMxcFO7nyZnPKJLxKTcOh6V9FwxX69

K+6rauC4UFMNqipsIKO1/AT9/uEmpTMc11zcV9RFHGgBWrLoJmlUQ1bKhjcQaSW/xVpjf5MmtscQufDP

5xoFKWxvjkpd9/6HqlSUn56Y9OlvnvFfcS6dXrJcGk
YInr/arRemtmO/VXs2qNxiiQNlmM7mqV1tloSruYxwnLR3O/NpAJaNcqYM6JQYGp/l+bfhQsJUmLKz+5

nHwrlAs/1AFkbw0cysKxDqc2y3pUQ+Wy1UhhR0muENJBavlsAubsjyq2mg2aLNWI1Htyfx262cazKV0b

ANcYrpORit75TV8L/KoQpbKaJ0sSkyMgTXGKln+d5T
9iajOL6tNTw7O5HB9m/QsfpS+ATUlx06Tej+9f2piTwq/U8vELnJMV/+qZOu0OvLVYd4m/vDH+xoz7yz

D8Ou3rdL+Box/jwazFhg9lHZARXqXVPs18m0i3O7u7N8jQxZYmG3Mv9f2kfqqMgDfRFeVv2nrb1yFmpq

PuDwT6abxaNuNgZb+JE5erSbR1Ommwckxvtjb3CBog
Ca1LU2hzkUNUYrpGE5V/CFRkmTcQON4f22bnQpPrsUexnNPpBvMY8szVIyjcVg+CYRoLT2qchK/VoYSk

bmntT4M+F6KLv0076wUjhG0F2YHcMVRmtDPWuUDPFsF+14jJxs9F1R6tTxOZSEu2r1c/UO6dbCwI+Oft

VD+h99IT2uJxPzC6S2Z01DUr+hG9EMTBl2qWoOKp8C
xTBraGjR4HXS1+LhpEibRX7BMFvZDeFrW6430Gkkq/gV2vgyktniLoYCmcrEo8gaoG4yNM7cmqp/CIq5

VWczDwiarwmoXLcN7zSQzGEnvVqprfb+CaUML28+y1VWjZEy7s7cd6F/RS5TuUIhp9TPpKIqBkORqUCF

8czD3FsK3JOGSXSdVoB0+IUEOPK0xSjHA2KtrwZI4j
GBxOhs6J6Hui9gssEjt3nQ2ikZmbaPMJy7IG5SMkYZYnOt1Mj1DlILmW0YE9oFmSl/Zvtf0eyS5COhru

ji1qkngwnbCZ7t/eAkw5jp6P3tLaIh9BQ1AkOs8VCIUFhc7uyDdrvz17Y+7TmdLOVHmaf7IeepZkMCNT

AEuFI0mXWVmUgXYhZSZaVqvW6fvnwHnazY3togQXE2
MVnwqM6PAIVXzKfqYl2oqtHjLsFTRUaGRBv4l72+d/4D6qZgBGu5H1fSTNTcCcjGsFXorgqi+4cuRP4U

L1Fe9tZwPttsuL/LOYFnRnyXiMsqSq6oUE9qDaXXrXukG5AgmHgz9lWzxvEr4ncdme20XqNhumZd9gLC

C3TpOXhJGx5d1SOr8JxT7C6IoDKSWGbU08EHhPMwvC
GQs4ogj9cfLHmpX1N4GvjA+tuiRGj+hqh3ESufFqvRZmoCUvOa6t3x2roftNE33vabbjqKNjLo8e/ldZ

n+J9rlfOWC6stSv5O6hu0b/fJ9oJVVztgiNZuN9QrnGFjv56+zGD2yyOx2unw+WAQM7z3bM479Wqsuc4

mg3zf85NHl3xEMm07twkBJ/saQchltJmWgoa53f/fp
VNAjxxqhC6uRl/qM5RyhiLIWnmNwgPvO+Zf1B8HvBFTDrOfFT+8VAHyqwLXZzS+CWdHFnmT5RJoywGy4

glCCzhR2swwoV0V7s5rcSJ/uV6cyqx8aOpFN2vCgFt7co4cRbZGvJNzXdaeorsrHV1fvd/pxH2JxprCm

uvKQ+KFgL82NK4+14y+2XMMo4HhN0HUxEsPE1Wc4Hi
p1vJV32v3vaamNyk30Raj1+s3b6ER9CTuQRgyx/bFquzGc8YM4VTplPF7gJycdocLJdfGg6tN8FIo6Wh

DjdwwU2fMIhDJoEK7qgFNUeI1ARK1vSbTUFeRSfzhPqGMfg9TdXTcouJ37tuIn7ChuKgolJ7qqMQNr8e

sqGa+Ghek51SUmyidghkJbdtuOeo6uipSEjYTineeK
awGTfiPnU31yNtDjq2L8xY3Mi5L+b7Pv8k+JWmtzpuVNJfejlyS8XXENDNccW4JuT/qLyDb4f+2K9Haw

8SfVF5B3DwK7h7TgwsIF3k44sddD9nCAIeqVqoC2IJAbbW7sKq77GOHs41OSjc4I/9OAhd0AFdi3VhX0

xA7N8p+uucqewjZ9Iv1lmqqhzPuuB8aKZ+5XYbs/OZ
i3C3LZBMV4V471a3yt3M+it9lIeUZVf0h6Op7b0kCofWbaArXWWOd94jrQ9hEUL37gFP7eGuPTKlTWGQ

dTDnO9PqwyXKzwUK3DKt47nSzzam/jwJBePC/KHW2AmX2S6/cKjOGYtXlUA3ckl9A7oOi2/Prince/ulU+U

dh+eUlVm/Edward/g+ZiETipoTbjdxhXsTep0vY/jWpkOr
rR3MEtScI5jPCj3V82OFbX9G5OCxpbQPiJwLxwFQy2psK55nVbP7GOAbHaMKPIJJg1ft2Fa1KKHCGWVK

iLugn+VqwXRrYfmD6yNxecC/ltz+o+qZT5poQc5Im60smZCByWAhlH6fjBiUb9B6CGZ3bi7WclSWr7O8

S8A/skBlCHVc7EeK3RpONJjsehOT1pg5+2z0FqqszZ
d9v1Tigdji0j7+WuTLUF+SlcwmM+I9zsnEMccfsEe3rgvcN4IMGoaJhx1mO52o5ZyJtUjDhzQUZigLJs

YYn+8lQ+c3JySc/P8TMexjMF8Fuyu2F/I5EnARMVDtXgJzdA/OexqCcG1CS+yIrLNKJFlzOtShZg5Oth

xsS4mwkumd0Rnthzyb2H45ANjb8VAO5UnP2jfdhr+6
3Ga5vdZBD6nZD+lgyS9DYNCDrk2nLirnORJZMDwOcfYuYcxnM5kgQl0jVyOCXTXB7r/lSUnko8Ytl3sf

BGlyDXti3avya6Vwy10++Qey+435A3ArXzZzm+74K9E86729N+Lr29ee/ldD4oAb77qRc701poP3/7Ut

tYd17GTsP1xb/0CZpK4TD//vbyi4pN7pQ+FQgoaG3i
DgMVYUz/fSd8Yc0elpKj0bGxPhRXQPK920v8fdXG7W5vbL1/4YjOIrq71H7MHbFqTrYxFgOIfy+zIPV1

mIDAcLGFNLzhIZDHU/V3BIHzQO7/jcZ6HsaZ39G07AJ0jeiHlWWpeWQo+V/uJs8E7zX1Jo4VNG7oSFr4

7ZzG3BT+YTtnS6sazxcrYbklZPeEsaEgmcPHDq8qNR
8y12ye9Wr1X1AkCWa+YiTwIhgbR3kqZAVYzbrgNespNIBxJWNf8wZeSWFcCNjW2DeKIikHYxYdtWsgt+

BHUffj8RpuB41ZgbNdnqyirrf/kIwURNtn/XzOgv3gqwWnaXkGUvbIUWSqKGhRVWWcQqWkTWoe7c3YCF

db7efydw6mcQOfeZiLZPkxsEvrvoVwcxHgE95vpDuj
ZxEqIpXZ0Kqo/zsfEdChobQ3d4qnYy4hVc4oA0VeUB48jUVyGjnOdDC2xbTmZTHb6/IyI//9jay1CkRf

ejT0zjan0fdaD8qN/y8DG43zZGUjCPrHMLPNGQ0pAn4vjS1ES1FYOtBW22zcbNFp9C878NWyLQyglrwZ

hnuk4RbIdTytwun5y/4Kt/A5xh/DOg3OX/PqHOkt5b
1Fuel9Jpi3qPE/CXnFj3smANbEwKuxGgpueMiQfxVn9qbR1ORvLdfR4OT4el6KkkX5gTBnlRxlEYz9/0

2lLsMXGbGde9ewskUGLEFVSAje085UUsByJnPyykZjARWzZkGRDqeXniy1DuXgdO0hxw8dh/qQHqvcUg

fw7s2L+7zdqcSfdRWVeYqHSGDIUfLxewFGouKCpT1Y
s2PFyDxVKkyDR8Q+w03BgpVOKKwBsNjeggRMf+vlIIofQqn4x8+itFH0Ule77ASGlVMlmBkc33si8H0m

wLoR74VlLSIuo8OnwmwRLACamD6aT3+iENcO4pbLWZWCRrCVkDzc+3M3ta7A5Zd0LGDSO6dBY1WUFSAg

rmVN4kRd/3bcZNHl8CtmtXARRr8qcyexXt5fdnXuDr
8Nf+vrIWuE1L3/88YIlXfyW+OgTueuCoUV+f/Kzkkf18Yl9q/1K2NfvssYI22Q/yZeV1A9+7Dri8tCjb

1wSlM3e59Y4km/vS8+MUgXy6KjoCPi/U4ijFgKSdd/hicwFrtStsJg0g+QJKmrqAKuvTNmYVwXtdPEKY

8xRKX66c42d+jjMz1AZPDbApCLDHDsyWAgW2Pu1iNh
k0bGSU0RRzkwHdd/ZxxeJ4DLEAmfkJJxCzecvyBmWgcmHTw2xTZpponTzrU51nyWaRYLIIfenD7JZ0/X

FICyqSAfq9pbp5+ZvSq3ZvZmXl6kkbZaLh2QYGlzsh9LBrko1kLCp98XLcC77tM/fLdSqvPDio9Xzpci

huXLgTTRhsUSkeDJMJCDyBI6lt6/qFGWpeKuV8C8XT
O314rAh9qlYF5wi+K721sRl5Lq053GRLsxD56Y519EiT4WThJFSNuH0cLDvJLGZCSerenh1Xg/zGC/YW

G9d+zsOFVKi4OILPUpQg/JKAF7BmZE9GFOzn6YbQ8tbx5zsnY3Fa75yr9tiXYScgkohsSn6RZ4xxV+iq

WcnoaFsiN2mXo/QTZUVhMqP7EyitWlgdJiv3mZxKr6
8gRRVxFNdz3MAqiPzWttJV76hJde3EB8FdKHCsXuMvHqtWonEZkA0PtXCmaZ0Bqh9m+gb+vxYyyIP/ra

d67a+e2vPVDSy8uz9lhPLSxLFLRiwhKKHwi50rlrVSKb8c60fogwHaFb1G2/ZhFE3V7F/wwGVmbIcTY1

zlZ1Ug04IDg++RW3uFunTredFWY5jBfYiDWAIIFQ3/
baS67+95WcOYzXOXL3HS8M+lSjn8J0vb0qlqlhtZuxjx0VptQW2Af9ovdHk6RQ8af9qXFOteN1mf4FHG

fXz2lA6by1MytiNH7A2rqA+bWUalW8OIOC0DX+AgrRi8xELhQGQHe3f7FnKWFw3ghdT6s+q7cYtOfc/c

TV5ZTcy4FsowIvLGLXODXDaI93YG9zKWec84mrO6rd
cb7nGjnlPinv7WausDB3mDvRDAmvYkHQxMQ38Xg1NR4n6cEcIaOs8qS2p+o8C3K7yKDvw16eXSZs5XFR

1MufpHsgXP7SiXBZ1ivZotLmkFi/nPLJ+jL7pkDj1U9nBD+ev1P7CdPsnVoi83BmqNxBvZ5icX3DH71X

2XHIrPVIVrIlHWDa4nhzEBUUHxLjaWs6X81YjgwXtN
88Y3d1fUiQQRgOBqXCOqjBlce5brkUhGKrK3h3Y+pLVCV8qZkqogHbzqgn2X5n9P1zK0AyhonRLYOiOH

9oENOxJXcCilYqmSbt+duESBn0bY5nMDX93VsoGsNyfD8WodP8UmlT3kGPtp45w0Tyi65SbY2R1vwFPA

IMBvoEE0Xi6DfNRMFO0zWflzpAq6s2LEIKjmuVUIf2
4aRacEkmnvwfLHqs6MqP0apWS/I9dPKR6FNaLaUOjc+H81Li7+xbjYZ/w/a1z8U+VXdkpwJ7k/7CcZkd

Dk2WdxJi42YlSLHc9JRHde0+9SB0hTafPTEprYyqCdps8b1rz404Vadwy3CxIG28mIzQImULOm2sOKF5

2opm5lnbzUsIfC8+QYnR4x53ZHhbyK6aoL9TrL7Rwk
U4+w5NFr6wudgNeOOiYqD8+mTgrGdrn06nH8MnVHQFnIPFgeYsXnEMEuwhLepC4bzIuhV0J3G4sL71ut

AL6+mHM9m13py52ImrnyW6hgLiZCggEBS9r7BXgVH7Nl7ymcGvV6oSd58xad//Q89dLY8ejXxyBbUsdj

KmeR7s6mpUIcozvBq9F94mSBXojTVDCvlua3aexZ1c
yooSZjIHJ/m2UveXbmbm8tBet41iqIV7qCkr0hn8Ub4WARzUDW+xndaxBm/QnBr4m7W2GikFKZXHZlt+

cY3VqxPxS4hjryRXROCPDNJyopjwtCm1SaeOGriiPpFmZ7xYx5hpjyRXabMEpYzDDnAHp57i8hmWTQ8V

RWodctjJgNsN4NgX1Re8TbAUqSqLtK0zPlUYtRCZJM
aPl/uJhosHx0n+cXEPIGrZPgS9yZzapWSQ4aa2quoxwk3vuNm2Vd8vELBPMBwm2BIzzDLo63ai9szPML

ttKga8a+RjoJmuEjGAS6w9XTPJNa/MeXOpVv9OVLOtG02CfW/SQJaTlyLPxvkMP3XWfdPCr/mF9EMkcZ

3AQ5+c6JFfSN7i9DGxJu/tsqcG92kI2YsC4leIGABb
NNHm9C5+W1QPRz4zub2tr/WkhkETgBEaXnrtskNDJiqlJsPgVCHSz7oKFGlk7m7PyrA82YYrJuygznch

TuOmwYdmRuElUhKWpWP6Ir/cx797pUKUVj1tIgM7yPtegaVxfYKXPFfn6T0cRagL8DMGtgRBVR0r20zG

eu4NfpUsCpGAD147Cbk2AVAKpkqJ9BKkA005XsJ3lz
QkLI25iTe2z1FPSicUsVEc4/J4vXJP+89W+b1/52qXsypIzBQAZEAK9p0cVHkkcZf1TzHtDlPJypfCHS

Zz480b4OdnmXX+/FlpukrlparV6pZG7UJ2JJgBRf/ASOxuIAFvyPactuQURfnDmjPQ4rgQiJL9CVoqW8

TK0NUQ5r00PVJ5xNyWBnyE/Br+3WYakO1OOCeDUBrM
bWc3iacdFb3fHdrW1t1HPF36gxoWuAITv5nom5S8YkjWpPRm2aTJBrCtBw0rWenWS8CjiLQkGezFQB4e

FmmTIXv3CLyzX1c9jol3pzy4QIotrIJDpSu7XXCBcbA8x7wPpgHRyMOt/KKrRRKaqvbl+BhNl4k7n05g

O0AXmeN7TdzNXaVEBv2qA4fpfxyMtP8KDmNrBCuIrm
X5X5hZPPHQFtXJCRlqIWe5Y13fo9RMKPQM8M14aN8dAk5OtvtEU56rL3h4xdYYRiLZXCvvgKGDn2fI3y

nXb24Bf0r4mVlxnd57995o4nBztP03yLyC+rx8Kfn8cay5QzUuUH6fK+BOGgzg3LDEkqBuAKgFuN9cxw

HWJX4quKMwEvXJyq3vma1NHpkZ5nppB3qdK1F2J8ms
lAcm1RmyD9BaALaf+mDgfqVSS96nztrXJ1X9dAinP/6QN3/+/8xFy9E2ijEFRsOzO4+ihtc4jcbQER1C

+CadmgdIsI9er8r1Juk454BymATxePNmkSKXswvb6tMf83PHPfco5FOJPnHkz+FnLKZLd1ipJnrQDyyf

ukKTorISSQOK9Kwvc1nOxVGE7fl4us0aWPw3v9Khd7
JX7Ugp877965JnbHk66nv5xAdYCZPYpy6LeMvcXhVP0Us6fC8iBhpoPGrVGnK7lDFn6RkIG+MCwrRv+7

W1imxtmAMtYDIczA8+PnD2lMkUAYbqZ6tWFx0oT2eDNjZXBj+3riiKOjGrJuNdK2rVr5VKtVc1SbaQwa

wQ0vCuR0PHuQvq/i/yWqNumoxUyCORge1Elmbh59XT
8OwLCPltulaVUFKZPg2Dya9tWrFOXIzP/2yBGD61zEUketpg4v+sWcMy+iGgWz2ei5LLe1E40KwrLanW

Kg71yXG39ORznLznne6hJTNq5YcnO3+nDIBpQfRGuL5t32hZVVvwmkr/CS08R6cbVGLVXARSgCMK1WX1

dN5BjgkC6woiIa15QQ3pDxnqfmbapeqH3urXNcmokn
zvhsJ8+zEdhnCBmYqCTu5Y1yMvoRDMPhoUxSSmBUdfpzHBJ69IQ05lqSQHR6OiBZKLPbApgSb5V6vzGm

BBb7yzvq31S1sGGRt2JTo53b6mtttO8gP8JWskWVL0C04Ldctl62L5E0lHF88fBBDnGQjYxJ/2mL2p1/

9yuv/QkzGOB0EQE7/8CInvd4CR6wp7OYnafaIbSJ8M
ceFHTF+mrb1BKufjo3qr7OoPmblNqz2XSbyJ150gvhqQuPA3H8g1uEU8hJskzTxr0LnOkSDl8/6sPEj5

4bUedCIGtvtRzVPkSSMy+YxEYWsrFeekbe1rkfa5MSV2MVb5L+hPZcADbTBb6Lu3MHx2MulCZeUNHWwA

YeGiWsbSK6i3OkC9C8eb9Nx6ftwLxiRT0tysiv1bx7
HTk+NXdaos4GCBxnQ8sKZjcPk/PPEV9gdKjwc8UXxY+Tz7Yi8/8IWSyc3jOk86W+sj49UWvMynnfFchQ

cv8KTvZ/rvFlk3W8qZt79s5+JZqZvIe5SegdE/TUmJoIUmDnWXj4jjAW/DNYNFQwByrzAKF1n6WnfC5l

n+FkHPqnzD1lMNM8M+vfXGxzYT6lp1KOyJM7WemzDQ
mOA2g3TtbuhjHjT7Qz+NuITZnrwUchbegHqkXX0x2A9+PhtcsU7Csxx99yWRhJsYqP+XW9S3rsT5iUqx

PmWVl0sYNF/Jex9GWJyJxfg+ej7n3JPd6UhFCDS/g8hMIQdzDwD5Qk3SxRYC2zdT8Y0mdf40MWOvU2KO

7jpsSNTPAA7y1dSAR7Q5COV6bxJBd/2oeQAfLGR39U
K+mFr9C8dWzawAKsBghzZxLrHzFVspkfIehSdpQnCspWp9+gfcxu8dpNI0KSFdm20Zp+zJcI6fdL6Skq

x0ABt60i0Ux+hmHKW06JeFuLQYHc+Veh2t8PqDVyIja3Ig7Q+vii565U6yGv4mslFmu8xvfqJVEUU74M

TljizqJXfKPrQwMlK1xVDmn2qzXFjsH00cTTZgGpoT
1nvts17hcoUUcfxXHfhr+mopGQnDcClPjAIIEFEW1aItk7C755cjj9wb6fEGq5Hs1tQnC60iF2kExBiN

6Dv4bf3e93bTDPhfYKGXESxalpD6DYhRWPW6fMJXrZKRJd311GyXpoUcJl3xrudHmQhlE/WjVnM3OTQ9

DdsO327DWHEk/M7q33CzYgWenAja2wcgLo6+pN7NFU
YvOiKz38T1kOGdfIAJho/DhuLLhi1GlKRJTl/zwQbxlWGGtx9f+FESNtVISMy2WnwrRyvulDOX+W10Rh

CGqxxY0B/aqk43tSnIJ7Y96zs6OGVqGXinmIC55nU3B6Mc9j/SXgQ2Wg2mKp/WoYkRp3v2oy+Bfl+wR1

2GL8J4w0/k3GcvnNmps3fF1v1EHu/AyGg0cJ8+VUj2
AgX47nqlSKN4YqNbrLWudG2f+EGYmeqbtwuOoyjXzcPchueB194/n8pZf+D5MQv0x3GBqe/JOmjID+Os

O2f4UDMuxA5zzT59fE1YiuOK1V1fZ0OkxAHwYLAqMatgWxvEFHNAbjyTMamqTY/nP/XjDjzhg+wXUv0x

7HLRvhSzElnKDniGBpNYpM2p1JI+BDM1KBG9CvOUnN
fQbiJlCfxDJP51OKAOqB2qCeAhg+8+vIhxch55NHwcApvuau6bidhM/n7drtLkLCf4aO67pW+rVhbhIK

ZDU9pYXox43AsYwrtaaJ8OZLwvzkoSUbJi6xvOYk+Tsko02/eMfe7M+rUX5L0MfKLs75QeBOF6iV5PsL

0HIGXhpf/pg10FbBLvXCQusZdwHmw+y9vgBF+pPOsU
ko9oPo1oTnA9o9ODV+d1bcJ9MbjQ1MckJ4LICQqQZXKn/tmSt7nD76iEezF8Bn9oIBH0vAbWx+hwrcGy

StQ3ckHaTBTmyGY/xeD7yK0pEiGsxX+gIuszCq5Q1Cs9pAjmFaEFoleHf9aU14j7LhmPqu65/lCk/5s7

CQa8cVyhFqua21N7kO5o4a29dBr9p5pZq+Q15m9i24
OiXCJow2voX2IWk4L0crwvp33Lavf87HIIpdsJYfaSqBGXajIlp55oPu7iRg06ImeU4GxyPT+4F153qL

l1Qo2HRyMalt6mlvzD/DiiQP6Bhyo0nWtVpBYE39c++3CPgHAMNnTX1coftH6EPjYjxGACXmGQakoK8o

Ezu1AbabqZohIv4T9JKgcGFZyfXS0FHEwqb0hqfCOc
ujTYN+K7mtuh8M7jqfdrchVxMAvS2Vx10rwZjcunlE69re2/35rDv+Sds0jA1H87nDYqHYy5AuzyM6BF

ZLuRQzE+84EVzaidJ+iwgFIazC49MUXl9R2YQ0CTxV8GeOP9uCKxnwY3RUiropnZmQzqRTPQHZ9A0WcO

kWoiFZ87X2+ovMdml26wD5z9pGviaZqQp6FgLVTkA4
8NDPD2fCvAG58q5EyKBQZHtCVrp3KQ5gL6k3QijcdnINqtavXxt1Ktc8zX9+9UYOGsXn/dL8WOBFwgTd

Fuv5Onzd6gDLnjlw/HFLMpweNDS1DGU+j+jwsJAfVJLV5VQaaYEHZUDDczUBPKNL0utfmulmlFMIGVhl

2XwEnJKqn82AzJs8EYAHta2goeVGQ6630JSvshLLSV
I+GDT43AQYbxsrxSTcSmrAPVYXFEn3R3njOoBoYtJSx65oJIvio0O2GFvDfeVPZuv9Hd8GuMdlRIPHUR

SQ3/sUAR4l/f+9sLUr/h1T192NmX0DBKhgMfnMc+Am9+826SzjJMjcQFk9BqvpZGOa7S279bDb5bXbro

9Jh460DLUvuUHQxlJ7cV/8tZ56jMpRfLXqRzrEuyT1
qOoeFSSbhx9Kl/KtKwQ9LvWU8UEqMEM7U9owxW6qs/H+b5VDtyvwJr1i12oFEMyDH1vHij2rc0yo2bdh

YuFhxeNcX8KNX2btM4w/uufBUV1baPlzT5EqF1LK4S74dKGy9hgK2EkeqmxiJt5F6R0Ew0Zcq+/e2HIf

VZN83fsjfOuKSXXhYad9LMDLL62Rwq+Z+SvMp+SP6L
yQO3LqftXSA9PU6Jp5VEhqYuINY9Ss53EGPuaZwaeF87njMAQHfGRmphhE8HgXa63mG22GoR+RsVSasr

ZBBKhvDe+HYiIH8Sc21CV7Q4kIuYzrOclknDxyCWo7RTQRXJ+CbmN92PemspnbNbtuc4n25ZkgDpX+Pp

3uybS7e18ZSXkl60DfHT/voJtWnuZxMzlUfyZlby9N
3t8z7rna9qNpmlT8HNolL1UuB1wUjalfiGBP+t/LOI0pmCtqIPNJ9Ml2kre5qzjuQccJuy9p2lfJgj+E

vSnIhamwnED6pdtfaQ5L4cFv6wyM1g+B3L67KkwTInlKDIOmnPBVihc3vFjmeT5i2WOxTBylg8HoMrzo

SlKtQZmomW3U5Bn12bVJ+H1ay2SF4Wni1H1v3B9tUz
Irt80fz9wM5TU8+i/ji7v/Bm8VJobQC/Lfm5Vz5tPvT96XR6Z3NnwmKj6/L5M/bujw9GwHKluFrHm+Wv

gKBT8ztRNz4tvUifaoA6aLx+sFdHOaxE1VZAt3x4NF/TSnUaQU0vuyH0I5rl9ghIUpPvSNuW37kgnNMk

b0gUCAEZD4okkSaTIjKAnL/QP3i34F3TF7yGUTWlK+
gAA0Svq6j1xnAWme3HhrTPlGNIhWgLj8RpZ9P+Xb1MvQSlwWfa/RxYf8bJKiSXG76cl5/yPsvC3o/2Lu

HqPsUDb23MIXJSKZgORGJByfwCUm0g3jCKNrSFW4Z+2XO7yEDGLR5+GeTooqEjWu4ah0nkv5m+N8P+6P

EtLeD4Z3r7zb+b5s2xupmDDn6phaUxSg8kvUagoW/N
LeTG8WBPG0SRuAs3oO51BEgNJXBw1ZChL+t3q0mGSumOcXkST7eRYzO8FV6LeP0ZboADO7sdY+0xTksV

Et0vWuj1Jnssg+bPa5bdQK4z/pZK4HD3ibBR0QCD0UfHgUMzyjGNPvK+z5CLhVQoKj0SovoF26mn/KmO

Kng7s3pwWWDU0DTzQPaDyxbOLeErbR9kJTOyO2Igf9
URJ1ygoeKT8/OHCtfVu0azcM/DJx6S11hXkimqQ3AZVUzGp5TFgPOU/wFYBCxvTn5nxyzQT8ao4TuLr/

qDUKAPkQNGgPzHXVChsLhUp3fp44OEE79AMk/Mr+jsdmlIbssKJ3VUdIz2f9LCgfjxS3IcTeQLYwLFy0

cMkNk4eWI6x862DKHn+pDU3j3j+cNSvTylrrzmyEMd
c3CGQ/RkVk5oEIcBT/jxxWm9x6NXnS4Npzq5McO7pYjtO+uiK8P9Jyyw26yWmlAnCOVT5NUKmfo7aYgx

dUqO0R7vuo03HFRO5upfEqo6FXVzqzspzpqpNCHMHrCvSkaDtIH01PSuDdPTNPvOPcVh0bfIPQi851DM

OngII74XdPUXRU8Tz3F4xnB+HkUanfiJ4j6sbdo+EB
2q/FGSmXdalsIUm0GPx8PILK3/TJzUs3z1AzYpFEPEN8yflc0wHDeo6iBG/gt4GuuL+8rOrmMhNSn5y8

sasaNR9RspZeg7GY73ABa0hrQCAZriIHyV0j7dkZ3BtKol84bULHOWNi7x0OTP9Lc3mVQiayDDsvxwWa

K6VQGXE5cvUmJB5P0rSjF+hbhWD3MW8RbGOJywTOBK
srCb+nXXFH5SGmI+tXKWwjBtUbnYvMhqcqVA1fKMeuW6/MBvhD4sxjFb48YnG834FktNOLFZnIpWwILZ

jlcNf9lNb6FqdDOuO7UG21fPTIuLtAgoBq7r5BcLKKaszIQsVBTURU70DsoK+m1pDJal+dAl9hKOaXhl

UjI+q4uELfDRFV51EI7ByRSmJeTJxQGcEmDFMgtbY1
XQpauXfNYSR54sVbauFpRXzBzI2JiWwiFzQTbrdfhcHyWVg0JzRHLcW47RT1a6nS11UPvf5zpGrw0fb7

8+uafOj/lUZhw2o5vcvHSTJ64FyhfdYRPvUd7pFnXehAi6l029Zq/yOQO19NxhIar39tYVG+7XEDY7rj

B3OkEj4/LXO++En70drtq9wqMlyo1MP9naX1gAQgah
Vjq4JmiZyrXuFi6vX36fso52Yhr89IzMhSJAunt78woPE3jq1jY7sFNoT1ThJr2kY3iLsLv7Upac/gPk

iEAphBU2Ofwyc/wyLn2Mjofl0+oypslUZRQkKfOEyWc5VrbwsyL5kOYzBdobK9p87F5/LiKUbfisFYQF

MWlehWKdU1qnNOuw1IJDu/t3Z90oM7sQPDV1tW7zlv
1wzTt2xBw4CDXxDlgh71LFzuOJP65Egkkf0spSNf/Gic20A2geoKe4mFSE30hR9ZRmkJl3nFvHbd073f

+F7dMQ3+tXtdFSsVwJ6mNsZQ+4c3exJv2p716BfxlOQOSTK0k2KmLx9xD4k3NZjC0DvKzRKdaiCF2kOg

spy+F6ax3DHYF0tSU7A518xAX6MOYjudTvEpKwucHQ
wQoFaVP5X+UVB6Wea8jiT2qm/np6H3mHfQxvd3fJ4wKgZbpVPmZorfbpcEY0cYiIbWiBdcWEiGChGSCE

ZFs3H3u7eaBUY07O4sV1/cAbKkdaG67BH0L3mfAl53Ogkgse9umiFa/YuAbMAVl2z1trwW1rMkj2S5uO

paV2p5zSfW4vxrL0MbFBsVEKhxPeIhV9OHEPFrQOAj
4JouSMmehRWuGM0st8me8gyXUEJBjv8teZGyS6+T4ctS7+ExL+rpcUy8gjs2LvLMoZQpFMjFWIc5crYR

UuXBKn8tZ74R4OYKhtWOqm/Vs5t/Pr+821fubdsNQ0N7icoOH2q+Tja+Lzvn81io+8C0CeD2M6O1Nk/V

5UcDj2YQ/PoC99xQzvdyftTb6J7iVr47UKPC3TgDTy
NaAz4nHGMIHOYi82/M74zjVe9VWC9o7duIT2cm0SU12FgmWaPs5FlsXsFFY0X1bvDEhuactHyNGx8bgw

tlA+fyajmHC41rwytCBUf0YyMeolRT2qjOp3xMLyx9q7WcGo1yTk500jrSQR+Ly8rkdtxexdFxGd8wEy

I4jAmbMmG7wDf3Zff2dk5CD4oTI22GICj1xv5Xp+eH
HX9CFTcTT63UqR++o+pZZxRloT2yvWGM4g3VrGgykQpMCn7HzuPFnda3yZh5nBf0K4jkDjzBdidNsyO/

gX5v0JyeptDypcWY+r0zPK71wNE1SwcSfkA01T2NlYLXtfFnjUOHzcGHrANBxE0JcNYZK0MwhgUYOO35

qo0rdPi9mZaTFrQ0g1y83TR3kdcwGNJKb7r63WunZj
QqKJeffhkFNyT/eehDjLNLcn8YxRmCnnZrjbysvlFEfc0DyJJKNybNcVyEccK7tI81GyFIvnS5Dk7lLh

CswgQC/6/hahquy3SCz2Urglo4c93NvFzlcYz/fMTNYOkE1sS3E91MdcUbkyf6oJBjH9e2yOJ7wNfpwp

7VZKVXO+sPAogooJ/bMSTWCt24uXz3KoZK2o6t/ScC
gwQFg2MP+Cszv7oSGY1eSn3CkY1mcROCGLKgpmjP3n8NHGeYI1mSGNh03eKNb4oDyBi9Hl3joKKSikRN

QRPHsnlgKYiWsD9VL5mvdIDi9s0zalOip6PE2/t02o6tS3WMCmVO3uDux2Xv/fexhA6Ue+m68/+lkzSZ

3emT5aiffIgAx2WIZlik5s6SkHgnNjKTw7FtT4xET4
P+YTFnHpcC7m/94+rUcT+ZB+lCfTyv29+QSZJt0NjhhuEgaWE4frnvnOTngGG6+hkDCAHL5xOSAs0QoQ

COqzFOZP/8A7ORsPc+QxIMcD3/x+15qVK4RnRMfRKQqFQL1IBnULi5dxbFhybYAQvmXQ+ECkxB3JK80o

Lz8A2PBQM0KM+/1hI9+D1AGQqMCNIRyTDJGVEIoAvc
fRO2wlvd/ECVDuHvuP/GslWEzYvHj+V4XbK++G1IroQA6wlWSUmEINu4kDaBG8sQxSsppdIAMr5ZZkRq

4JsmvZ1L8usKuUOIl5vAr1UHMywAlEt/DQ/tMxHZpFXZbtvut0CT6YkgQ55rGMtWRyPbXteiMddjA9f+

haibAdlj9BkYQ7iN0TCCfmbvyGTlemajoKf9fF7rnD
3SFzpj0WbldeOP2sGfadkCW7fZjGMPTP2lXcA3mm4723C2BfX+fcFQmd55ddrBW75E57ihMgvhv60HvN

LWKAKr9gYhDyqeRagNgSYXoT1ofFgDggWquRLvOJsDb33YBmcnCCiCTcQA5EzkiDO2kKGK0ryInlRb8r

Olya3dHCAXHn3fie8Mnrxu8I2Dod6IxZ/gFpHtwaWg
Pauline+ILjAofgT4ZIwEV/jviM2KtZRDKFccwJeMK2cNk+UA6Zh+EQioidIzHq4CCGq5T9AHtqIXR9DOYz

noNOZ6g0rrtzFXutSbuPbuTWjhuAHikuuI67p4eMFdC2M03ADNmlUdO1X7NFbTAb8X8BMlg2VLvpo0qt

x0wbz/mRXj091TEE0LVeWUDnliWa15qjrbRUMWRyP/
m61PRdnqMv4y4Vr53qUdLEvfoC0Au+arFgqIAcHtghBylqtnXBoSwAxlZkBeklvhEShuLr9M5wK1fGTq

Tx+oTAQQODbg5R+D68u4w/bxIKCtKOuGryZNHQdhQH+Rv50AVCvAEN3iQrQn8n2F3/HvYLv4yNG+ZW8m

vLHZlqJmbG3fcInR0IkfknWQ0VX7FwUKd9TRpFX+78
6vpD0HTzIQUzSAM9cxFc1iCGmAbqFD+W5lMZazGYHG1XG2GrDCl7/XFx1o1pHzrI/+769jW7WrRJawza

TfrcdudziZFEpwY/tbhH92x4v/9Y/j/uU0/83oPfBGDeILzz1E/4jgWbuCnwrr+VENWbUc2Ikt1gWlK9

BXscOhmgrN1uimEMJI4stWuBdKGUz6wFQPC1sxkTkc
HS3RjX2EzGrU/BEBY4W3JvpjBTBreBSl6qU4vy/ZufWCE+gZ83S0EpXKl8AK4KH6fkPtBJedtJT+kdyy

7ooP+S4XOUKwSQnwzVdyvKE9BsWFqwrtXvraJTV+LC3p9YTWlnvWHlv3Y/EcBYiSwa9JR+IiUl4KSJKS

FinjZGt3iZ4AVQpMTva3EMa1mL1916qu45RLfQohnD
MCKENZIE+tZRBQeW4MPAi7VcRrTzMwpZORQCD2119AdK5CM2yypODVTnu5SIetCDSssSmT+MKWhPekZWP0WQw5

3Ko1gzO0B8oXArOpVTjzcgXhCjO4+tHW1WT+M81IywKWWCHu+Jd0N1vKWBOKelo8lF+5i+gznoFuXFPv

DWx73QOrZlzusEg5EnDf/oRPRp0txEuHurzgqmMqlM
XeewB+/bP9NjAEMfgz5C6m1gYLS3WcxHXFP9Fanplyt1MB641ojkrvvsM9+H/jRsJ413ipEFMrKlharT

HXvzzFwrR0YPBLasQ09LR8VcCGfgKNg9ylNd1DQt8OKPu4u66aDFbj3ie8E5SZOfsT88MycZS+xeG3f3

l4YlAu8QGhTy1Waqlscmqy4FuUQVKTq8ey/Dib0PTA
F5IOvska8Tb1lilQRJtC7s1fWM700Bk2UH8m604V76+eHUUzbvQARvjM2uyvJy0o+7cb8I9AxeSMbGAi

f+iqb6fEGHL/YFrEvxoPwdH/28a+djazTFEPHRD1a8xFgR914ePRo8Vuq3+DQ4mU4Mxie3kE92BTCq1X

LoKwFXrOpw6bfAZSD+cEkXPWWrSGP2mOinjbXNq+Ci
jWhX77KriB5ySBTYFQApQiF+KN1ur3V+yYtV0hPaRI6RHaJFhws8XKVLJcsKORdYwSyGoWR62afFcNmS

yDUuDZaPwkY6Jtgfm3BUMjOrKV4ySsdjwwvksSGozgSlyvLevOd6rHB3fWmGqovx1GFo0xUFoCnTIZFF

ViDOpeVdE6VyH+53hOOP8xCRWhOC1cduwMB1PUeYCT
E7xFXPjjB+FPYOHeNASmzK2q/52mgocBIVvs4ek/HBxnm1UAj0n553AQY+2F9ZjCQnPCJY9IPuQa176d

rfKrQ0ild/9oLSUitlD3ZboVtPjkbX9RbLJwVGbLm0mKrEM9VALOGvXEwDGtcf5iAjLoUqMpsZcpHNBV

5/fUTD2RZAfFOMMN4dyJTddg/+AFzYdT4PFjHHMTlx
E0XZ10R/e5ddvPDnMNbTmV5m4w3VxhBi14lSgv5Oe8dPxTZUdTioFAJtILIMv1hURjrKKPDkbdrrN4yw

ySP5BBblvhFzCpipkX49aeQ0v8ByNwdtimoUotPC8EOPiNGMJ4JL5GTgiz38rGuflRi8ZPI/BdAG8GRV

1m/qrEcgpyivGnRQNSLehjUG+1iPbCAoXN6xz5KMUx
7ocrzVh8bnLllBSDWjkweWRXqKZjuVRTSVmrCWYvZAdI9AodncG4np2KmLs5sGM31/8u/l7WczfHkwYE

ksGxT4+AzA0RrETrc9JovEIVIOdcZS06uQcK9pMQeWIJJmFLmam0JeBAkltS33SeVqQAmVddxKzHp2aM

d9DkV7A+E9TOs5uf0thDK3A9Z/Rl/1Ub/LAGPqSPgY
cSdyhlhteFe/itYMRUTTskQcz+XdTTLX6buPty4alLh+jsx5YGI2yN/Fn3U5pnBtgKAll4dDOPCtV/Mm

X2H1nQ+/DtP7+LrptrMXhcWwDrZVJIEvFmfRXJ6awsVikbQB+ryzmQ1RX9Hw56CL3Hmw8Z5VEIaS8+B6

cLgZGM4sHP90LdXJ83CcLGqdm7F7TxNhxVjDZ34Sr2
NJd8WrB7zxrQiAZJDNinCK1XExUFw4dRLu3IvC0+GAu+Y6cYGDHX4jiRThS/aNsIyQ9DgRnLlU2x5mJe

c6vAwOjGazZXLPJ/RXNNu2HcxZtpw9OhbeknOuESGMHDOLYtgiCzdHl2JMRln5R10cp45LGIPndjvqIJ

FJsFr9viKuZIQnYQ90XrfM9Ry9u3IwbNCi1Z3zBOna
Ea7NOfIjgQTcaa1AmSEY3rABJRFarmbMXJN5OWZGOb1pdqGeyoRAmECZmcyc360D6TsYdRPb4RtHXozQ

/tMf2uM6XWtRHP0Os7somXub8wJQQGIeY6NLNhXQmTxKIBK3k+pwNjFB8S/G7kToRTZfkuJ5IEpiXeua

OqLVruGYAgPm/22PNhIVMAkB0K0saUfmrltVYmghwv
wYV4RwvrPep1EK8vA7Pnq+2tZ+bj6bmCJnCbuwy2FNjaVL085E7w/+RzLZAiQJaXt9TvdADULrq8IaTb

8tsMZi1rsYn0jiLQTRpdlvlm+wdDLsP885O1aF9vrykkQ67uiC7ZtM2oukPTLL/y3E0wIkhDm3JSm4YW

9Y1KSV3MRBsKE5U+oXVLbBGVXMF5Vgu9qX7mU0DuHx
qwxE9xnJPd6T6Y3nHEHaRQ2LaiS+EeCTGQLkcFmuAjivjpgh3Wfirv+B4d7Tba2Uk5b9256lLN4uLwWI

mbwxfTvk/KZ2bcl3cVYNAOq7BaH1ZyOymoq8Q7/7Po+cTNhwGJU4A0UGJefmShzHWxFQJQYezvUD03ks

KQeam74oWkhSKOtl6k8mmq1paV01QqrVhud/iQh4OD
Z/xUD5I65vcQut6MGpXr0E7rfXz5aC54xmpmV0Nz6nIUqN/uWYz4Bkj2BBFuQY8itPEAlVjtvZgEt5JJ

d98jyWJKdvt3gLGR9PeYrZN8DqPAgnisJS4RD1/uvUQIo+dEtxcO2vciYSNhG+IM80mWqOiZsYbNjWnr

2ys+RZAWgY3/ga1ZfS7KVhS/S376VTIJAY74n5vzFD
j1alYRl8YfpdT5mjLpr0yKU0TQXKCQlrRsBv+fJbu9ubyZg0DdyCsVGdyPBzH1vpe1g4pmHjXdC86Lk0

gsFBhQJ3iIEqAyAL6DPq51qcIZOCot9R+7mFc1vfV28BAwxLHjScV3lc93hJsIz/Ehagclp4bFeLqmbo

MUrVdQfFTx6UGHI28DvMv0ZA+FVaAmVmf/vuAB5d33
c/IckeLhevm6gSdpKO6ZUZMrdouf4zWDj4YVBnrZkjMqtc8ZKL2hRJI3JHmKr6qI50jeEqYFErbP1mFH

PQnLGNkOBHoUoDAzxWevzc+/BHvpW5uDRrqaEBQJ8rWhqs6htkYaelDSWU0pBECdlHVmD2HWVkt3sCiM

lQguv/5ZN+TEFpSM8VzGagSqApl+j9ZNt6YXWNuwZe
GAI5OKfJqg1NdWexcrIQCorhFWTeMtIa8XRvskHb3n/jz1L3BzqHkNBG1kDgW+VUBaRbXxnDGrlbpQy+

wOXyTp9v2TElX9xSdLqDbOJMIl7NFHTZtY7GbHc4Cck0c3l5dJ3S4uiGSWjkbbnYKjEVMXWM56Um2Sk+

vm1O6yX0wGKvqxHBw4+bc6O19f9pF7fwP1ZyivlIqu
bNaNsQuVo4YEVgRVMypC8mKrrElpIPc01DTUAF7KTBNhxp2E2PUP1u3mucs80nlN3wvzQk0aG3YFppyP

jHhhpkKzYDFmtWTETr3/tJx47437o8SxhF0HpWWlDDrLvbL2gMhKqEhlzhs+QcDWQWzJ3x1ZSYrHBeX7

iJHs7vWGyYBc9X1NC5y7eJ53I7gSkFwO7tLTrt29BX
EtHmJQU9ZPWGbnbfTMY13dNLWBaAzXCJzJfDoDZoiTEaZLR4rybqcc3NNlosxLWkpiSd2VyfEz/TwGzv

pXNuaYZDtag8W/iMfDNfnJ/Im6GLxnxIYp4ySBoNcn52zuoeB765NM+LSFARgJ0nuzT0ba4EG3H0TjVz

Zkz+3HUrgbMgSQuc4gWGdscR9yaXVEvnzK5PXSYmoO
UuaqyH7KxNdBV3xX7MGR7hvC+bHRPhTLwk4MaRU6eQkDQiCIB6kBrWhkNiO+UWdvsBkboea6fh9oZ1cy

I9QakkxiPL86tAxUJ1XBJwOa3IT7siX8wQrKiC6jQBEgFfyU1SmNzH9va1ceUe6BZNbeP6gnQGZlJ3Yq

FkEm9fGe8pIFR8UsB9GPaNZeFw/OKxP/kaNnubJNwZ
fA0/i0yBIZuUq7kHcFjR7x3WAFi3njKdpkPw6Go9F+FInQS/pQCCnSBWC9KMi1nJI8DM6DvUXMjkuzoo

zr/RuOmRDPl1jukxLB459FZ/SbAsTmwTPdTytCxxaq20IwSGT8ulzOxYCeyvkCf9c/IIgXmSqk6QNSP9

yzzKUQK/6NJLixZnrrNjwo1Ed51Ja11RTT7EBko/Fw
5qLNDI3hzh0ED+ivcygHyn9z2g1+LuvzcFmja5a+okNs6zD96Cdgm/Fq2NQEdOmhE63uMFp+T9CI8UQd

16U7EnmOOtfm3W1AhmTrGg6pEjritkQSBK/szG8NVjxqxkO/LE0TV15NDuz5oIFxG8cOqpEUZi3MjoXO

Or/ZQOltO5lDZez6gyvLr1EstMs1ifL4QNS+WWK2jf
iRhmBqwuaWG47hi4V3y6IPftjHBFeVG4IhD0CwiQQ2fSmocnVFQosth6fv/bq1ZZL5rA772b4d4WV39P

N2r5h2sqfCN3mN3UGlWuPiYA3uHhwyD7oA2xXdP9SipH1N9CuHAv39t5gkhaAMbjIX44I9TP+MqLlqz9

fcCNyJZnSUbDEFveYRmGpq+RBzb9F7hhcDZ2sPxo7l
lBzxNtdlvzxXbWsBKcnNzXklam/o+1Vr9yrGrG7vK3ekEpmZ84VC9fSrc1wUQXNsLomaMB6knjPcXKYL

YKoOCbTuUe/TpYv8bLCifvQxXfUvj++ClcQMpKD11eTqrJ/gVqedlWTwhsGyjcwzMe/BUUR7wMok+SZh

j81AUUc0jLGDiRX6wfo3dB8jL1aMb59/BVICuRSYUw
9mbsmF9UmFw6VOAiDbrqcbVb7AibJSOeid856FeHOhgPzCw6aWesevlbGi7REV30OMEumzF6bMz3Cy61

g+r5Dhl5+um/BSurSCBSmekKdmaPESPqFp0QSp4BltB0sFIOoO7R0xu5Crp+14krIDfIxf9WNHrLfxCG

4Kr4lSJX0HvxQwskepzzqGYsZdvSJdDB0YyNHFe6EE
IPKsj0qWb0QTlfRY8lZ5XVin61pVhkwI36JanZojBxq1kFbtrKks7BKaBHrwa5qYB+m5q447vrQnbHOP

/e4ZtB6YgMY6sQIxpSyRzT25yRxIZk7x4cnEoOIJ5yI/sNMCEQYMoI58s55Jc9kr9JmLgF1lpQtFEGnP

heQ0sYxahgWvlBJmZ43Id3yKTEcoCIR8aW6e6xqHN5
FQUZ1HAXGWx7nVTcxWc4cAm+VYqKHJk+asJkRstl4qXHQ42gK8pVggx1Dp8hNNm82ITdLKUwmdaCDI2B

ydhW3gP1nXFYkcf1PvEcOO628fh8qAj1eXX0yrrAysdGAYUhWUuaDrqkb4EWTrvFnNeoOi66xkEmxik8

OmpBEMABIzqj91hxySRelsmGCWGXzCa2Nq5//SDg+H
oUEkfcv9pykL5INKtJ6qfZOkMzOKJYZpfjYYBtKH71XM/Omjs1llnIqnkZpGF0QgkI9dBBtO9IurwZpO

0J2przhblfYDh9vQargrdheygC/eJG3/+T3shldg6nLKgEAwu6+02RacytkuQBvHt/4rsB5ILXk0XgyT

8lK9GanRQLzk2H/2NHb0WQ6KXIMvpFD07YPrjUOq2o
SFV55cyurFDI8n8DixaSD08KvkS2/hvmjRSN+qMPGcWSt56jZXlNQg7yGoPxKTBwSXraY/40MmdD36Dn

QvmovKFeqWBwHykIGeaTdMxFG5SkbvbL4moHLoq9bEIXn7VxOYuIYwCtxdJ/2pTsRmmaPDdGueYtRGZf

jwF01rXKkWvLYmo7mmxK1EA7t0WKqeqUXlcxXgW8/l
Amv3VLJYMnr+M8gyjY8xx+7c/eQVFlNzoz1zIUpi5KuANOegYSy7fn0Rj6h6Y9zH/75dumwkjTi0tx3M

0LT8Mikj4ePW/IQNXTE/zyLgE4RF+KXKolsEebQDo3jOf5qVI0fN2PGi98aqvz0f8bejcrR+nKPLM2Yv

BnjOHjMA1mVigZK60UROIipVUnepQm4GvO4gztGAS8
PMOMB77Rjg/mryrlf2JfkPWzk6BQTBGGoKIWAoE7EI/GlZhszfPwABVrlkd5U0bj0DztApneaPdjGUOW

eDc3IefTv7OStygjUP7xRbhTO+lG5+L+AkWjxr/xyZMw7gxX7KDS98vr+s5MyVsFfL5Ryvrv/zbe+Kxr

5el+s2K0SpHmlhwREt848xkx87H+XOq3g7NGmvUsUT
vQUdYndpbY4XyxnvlZKln7Mx0jJeNUPPNIFOd/3O5WXtiN98e0uq7+wVbUZXq1Q6xi8SMdmg6lwwEbwN

Ly8K8pQkTqxCsceYeVz0DCZ7V3jaHYK7IHV234QION6rVnJ82xjj+1x53LGiG/Kilo/e8MTrfxBwhMMKln

4j4NhopHyCAH5WtW7BuW366LuYPWyKpyibkj77Fect
kqttE9/epo8e/ef9RsC2gqMyTG7asdfo/BfSly7HDcEXCjZCCM/KHHZVa355+usiKV8x8BV0FtY8RseG

XlT2tGCxceB2Gpwn5ihPw1t1dCt0+UafHboAX3QVeFdD2yzrOg9mm0n6uPkVcMt/5NkuULapWXASFJRP

YwH7IDI+Qi/ysTZlgl3vErYiVoZ+Yso8m6pwlLUchO
d0+Britton/fOKdx0cSZQ1Unz0K4uHPI7AifHAT834eJ4RV6FHIFgyUve3gDpvGTqt9UBzfCUHQqJpSL66Sd

YxrhkLaUEYlTitOSPIkbeZJ+IcLBXTtr6XvEcsE59Q0UHj4/xNffvMD8iLgXXRnA0HInrRdypNVQ4zmx

cqDGQluZvKNy7tF0Efegq/EUk2RrulTNkbgiIy8pPZ
+4wtWaf6nlb9m9LUzdksasy05ZxuoehkdSYCAWHWw64NwADlt3j3ECTAgN531BODtsxuIP4SB92JCvS8

2Tr4P38rHEvo7JsBuXYA3XWKpRN9WvUw5PIk7lKbcqJs3ACutjJ6Xjiyz3qD8g2yBdgl0e7/AaDxqddp

fI6tVvV3/AGWYYiJ6nGZ44YO6/ugurt+cbWZTUL4xt
P1zJNUsvJFf0AKgfxMsTbxJJ03sxw0mtDhHjHgviEFgmIEXwFP7K6V0lNIn7yPk0Sj7jp9YCv/aiYvsy

G+d6YKf1yedOGs5VWAGDPrSISjXsjkB+kZ3sRilfMtazf+tc7d3mC3TUH9BOpIPZYPncfgto6KBrVUOC

74TmMNO51Dk2yIwV0KK3+8DAh4BBK1UsMpRMRjresG
oEzqkC6kpyFdmHzM+wrh22QVmHZvIm0wmKQ6nURK5MA4912aIAdbP98WGXa2e8x38/LsEQydHPrVfdZ1

Y+jItlbLtNdLJw/nv0lEgI/gdbDTLQjutoxs1emgnQw/hkJz1YInkwFwLP4H3PPaz3eEvOwdu/ALTwSJ

sHtIZyr4pY5uqIug9bc6Aja8j7Uszd4xD6QB5iu9aV
FF4TCeaqiMCKA5alvHCYwVxiCXozRqGsLu5j37rnYHjTNnVKYZYbc12bCHlDen8r6QotX3kakhcRlyXb

tUTIhT3h5UvC9cXYaiOAurmOv+e72bLfyBL3l40DGgwYbAUYO98jX6WmxAnOW6JZhhKa1VTXEAR8ZE9i

icCj49iaTFy0uK1TwMCYPgor/XAXCOciDUe/JjF6oW
tDL8c3IPIc78DXt4meSVW5A84+PnnkzceqTvxQnZFmRMgexq3uIDdyUwY0OvZ9qbJFPgvKccTwPl2YFV

SwKEJrbmsyMApe6tcvzg3V4mGxdVRuwN0zFx5irUXBk//JX6yKlzglpGjqiK8z+CD0A7RqkwG2bw4+Pn

Xlzmcn/rAbi7Cav4bJ5ZBsV7o7vU4er12WFpObv0QE
dwAQCat91IXL2nZvsch3YNK0kW3rdywJg9EcgDFDMWMU5nmzietgDAtZWwr7RfumSEVsMbECnLss0l0H

/bFWgpiAuXAjFa3CxlUZr7DZVRtxKE6HEiOnm/76gjH6shyJamkaSbOxeXZuYo3UBdvsBXD30Xu7IYpW

g74rftsENjrX0G23Ocf5deSkwGOlCd4eL7Jus7fg3d
PllX19PIpEO/efUsOByG59Pyfyj88bsm1KRaEPDHcQMmbFXmgRC6UuFLh4hzZqgvsTMrztQnRMCyJByk

8KiAX0xfterwzT/K7Ocnwav2ciiM0yylR0iwEp6b28iqLEtJysbUup6MJyxpRXceX25Ro29Ltzi3+rHO

ifm01/uzKe5pz1ss5PM2cVQ+Yty5syCWgOkglm8oNz
m+bvwK6uQjSdJxeDoL/Com0lolnNei96zcHS0acW538vqv2tB4ROvEnZnTKCaMpVmnSzs9+LAjQAOpR0

S0rM2SBas52rRypG/VrPfjDl5uuA8FYloguq+Qq0mWSho2DdgU/xm7lQ0wIuEHx0r68kqh8Z80KpU6tZ

vJx2umy3x46K7g2Ex9fyTFsVXgy1hUjmvfCUzpM6LO
LToOjn0BW5ZZSQpAb3IB5lXQQFRc4uBkvoNaNxVfBU8Lo3akWaV1NHoqPx93OgoH49/UvNxpHQaM9Y6g

6rzf9oj9LmVo17rIBxRsNkpKdv8ny0oeKFMj/59eBqRWJSgB1BhvLKzIwKcgRx7NgbPArA3w2zrWxpQM

If7MdB+6lY3+wLxuj7+mKkVHhtqPD2knkaXjBZpNWh
zi3r+FzYnUMIzsn6k6drmh7r4QwVXwb7WFDG5Wx5S18B/qF41PoCPgFZ2z8wNaTOGeydlS8YtVN7KKbQ

B4PPIuevpr4EPnbt9Spy9429vnzr3lQC6pKJThv1msp7wVUScY9FvGpnU0v0eL/PYV72lYNJfnsEY+ZR

m2OCgfBXVXC3Y8JIFPY9VBRn3VPTEkqOpUnpvYo0bk
r1Mj2CYwBEDqyaYbjqzRnmxX4W3ULRJG3HRc0sG3mmLEmpFZJ2Am9OXdCuL2zy40ZF+Lt2dty1zaZv5p

rIfmgFGz+vNlbgizz7uug3ozg5qponBMbFZxhzVReym8eHxYHK2Pi1L+/CwRLt3Yjam1piglMAJ4fUZs

vqXa2w3nEasMAIMGxywa5GF6Heg4F6TvpG4820NtX4
6Ilx1VJA9A8/OmHGKPt60/DQIySKeUyAh0YWg0iHfBVluXMS+FmQjucoLBR1QmTT2bO5K1oGWHKUfScl

lwNhqRoZMwnVuzHFquE5uPx5YtEqbKIRjPMkWQ0LC/QByuP7PDwiqjmHCJ62OyFvnd70VqAFwAQ1nvzo

qheBIxWE69LRMJqPsPljwCScdRT6nhDOFUhBa0jD26
1T+6ZUxFyj/hFTLib5DF/R8FyFAEi494RwJcbqna98fMA/u3ohhA9mrE/mdsq1t499wHhib9srrVurtX

tyFUtdXWyI/5Zsh1yR5IzZbQg5hF9BtDdsoV+w2lRMcgCt+Gr90WYgcbp5yUA7Z+X0wMn/bMaLNPomxG

YhGMCDgiIlM15gmQC+skcWG4yOUO7ZXUaWt20zeKPE
1S3Bn2quWDZHpjHJqP07dA2Y2whWUbUpwodb6cOFiPSziOMUk6K2YiBaqsv1bEvUytMW7fkQov3PpI38

ASQQL0BnDrnMDgOlUsvAUpR+mR86dUK/M/+4uTmBAjaJBVIaELboXDznwxepxkG5wQwtEe4yVzqdQtCj

W0dSa3vwrmbF56s4cha6srUbYPVKeRlmAwYqOHg78R
54mw4uIg+kQPYhSypXZQX76TxTYBHyPIu0o6wkKLquDr9EBq1y8bdd6VAeoTSzClMe3qV4DVwtQ19V5l

v/6JCuuqLMf/Qn7ctPbUO8z1nvpAwVHaP5a4PkDlOTbJlAkjt1maXRCdlcYUYxDLJsfWwlGEIdt91ciw

mrWNj1I22p3O0KbaIxLUpm2gbhYwTJShVbmpkz/Bell
yY7AVAZ2q2/Svu1CAbvqWN/vlkg7/kLOHzbP7afqIkLompTlRaXEZH3VRd9cz+qDoVkXf436xnCjTSJz

90Ky0z5y7xlZ6Ntd4TJwkv3rBg8fNYdEcszMvoB47h2iykQfFnWnFh9cWnDotfBs1eDpnroe5TcLYeD/

IXOlR97AxIJ7Obn5tEqOqX4+i+gjBatxsc44fmg34y
V8zHxOz/JmsmVchjmjVi5JXuwqGxMrYzMsIMTokC8s0bd2X/sUTy5aIn5t+PSL3QkyrSVcPxbxpvZqyM

/gFQfNcz7IhvrenrTcQ+JDa0ERfVMI2HCRS5AcSN1Xsg70VNsrH3cqpD2d5+bCWRVmqpXE4GOQ5DTvrX

V5IRNQYKX0GT0MtYxRY6L8be8lgBaBK+ayqJeK5Ozt
Z2mhqSodB4xAfVURHrp9CKcCOthAszzF+1nZHTOPh7I5pP/oAO4JDvf8NyVGJbL7/+wUMzAl5bMpZ5/Z

dOX8LWfsTnoU0SIztChVBPtXd84Mhudlj62spzcF66QfeyetmsetlLvt4r+jyW94KXyyW1TFkKTFGhn3

1dLU2vCLV15dTmBweADDHhwYXct9sU9x6jeo0veyGO
Pi45t+uD7WL+8AX2AIWDnVks3R2B4v4fDDwWmNvshAJYI0popZ41nIn9n1MNL3S6eteyP7uLEWY3JNqC

QQrIL4WcLHK+dD5BIz7NfGd3BkdMXtTMCpeOiL64MQbS68E2gw48cjLsiITJZBEVnH8ToXipIJjRoExp

/fcBhkYYdluPfx2+3wKWu5vwb+8F82zkhMBbnotu2f
tCdsXZIP4qqlCQq/ccE9JldBHlAa70cP4xX4zT2tar3G5EOatgZdhQfRV59CR9WgyPtawQKjADj+wBmv

TFtnz3QqfCOS0DeSZmFKzsUelnLwcgNs15qrnkY4eqBcJ4dPIlergXY2iW8GmTLKtQ5FLB7gj80+OjzO

pcUV3EiMBHEZC0WCI4T3NXLa9TrpiM7hBgswnGfOZ/
vw9Enbh4U5BNog1SoBfTLoEE1AqXoXQN5/kh88QagyKUjvSrtQlmFuYT47QSZuosNDj2TpL0hzyejm2O

dxW6K2shu3Ce6Vln79prL6iRR8nqaGTqgZF5jnnODAEH33/lUaKvhIaoMTASu0sw50dLos8uO2zikpnV

a0oCw6UUAk7XhhzKFU5DZRnY1ZTnQ1bcL9ppFTuD8A
g2Sf/fhPTliUYHbvc6fDQiZ3nf8zqNKPusAapsqATckxVttGy1qEiEcGJaMYPRkFfYtU321fr/2jHalp

HC7toV3AXDnpNaPc8gGOYxN/+moH1zsIhBFyrrLhFg/QYTr1Fc8KgOegjCAOlvayHV7DpjmJpCiRRr9R

Pb7IM2Li2gUMqm8bFRCMto1ablpXW5GB6lJedycKlZ
fd91ICtAaSJqAyOLtcd11z6s1mnSFepntE/Ncwtwz4qSaZd32a+xb3te4fLQqxiXHISKxurukozTagmy

L537HwDVIpvBRyD5Niex6S9hSZMSIJPui4ENzhaj563g0EO8R4pbLaTTi0tnSvhLOeJf/DMJYxz5oqeA

Rqkvs9krG6chG1h/HXKzUpheVgagDj/7IefTzmddDr
lIdZv5r3W6v8rmPK957MkGy+fN+Z+brBC+79HqG1OI4WIhYI4qOZTQXj5zLiWXRDNCL5Qux4uAP3Zj4A

KB1DMc5gx20sBpN6uLm5iH9pr1NdxDj8xg8rVhKeNGC99X/gCc1PC57KSDwjwbbtO4xoLwOj31Nw4QSu

y78j8ckW+GHIRXzDju0EuYV2fA+6KdwD/xtUfWzu5j
YEHyUv4hSP++09AHTkaXBDeQ/wm1q+A4lrC7POVKk25YH3kYfJmupc7TSaNRB8BaG9mwb60yZu0zdr75

CcvaAKnrfiapmV9Xq2Qxh1/Nf8P5gHjCtfZl4SqiLKlca4FMKdJUIz9LN/7HY5nNZvMvvUPpCeDbl8jf

t9INSHnFIw7uUGsQFLFQL/iLvZZDcSnb8U2X1KLAxj
3ZB1357YPQLg/2ZdXSnY2eKzRcJwZKrmvDS1YJD81DiWm6ufrBiVmkDodV3RqLckgDsowuhm6/w2xK/2

rM4tBH+8S02ah1XMpFc70ZWyj33FjRA+iF1UoFLzAyKrtqLDPiMM1f7sGaogEO1LUxZ2Qhjj4U8ITrgp

ZcsxZ5MuSOn7GZ5s9qveqBEAj4vYPdHXp7487KUSr7
RLRRyUmGu/OqoNvFgGOErLxRUtQcCIR8HXCQTtmpWuGkiyNoCgB37Sir0F+IT3GTplzL4rgOinbkAUC+

D3T+un9umz2g5cZk7pIGgqnOeGb6y0OBFH5v8aUzue7G/AgescxDxveWzIWMU/uR3A0Lmm1hQW3AVzUk

j8gheJ8JlJ1pIvf+mSGTOZTqTI/h+adHrk/x+hdK4B
jesePr15/uRYiGD3Q90xr9kQfE5lefduGq7OXJnidSkTtPkyVxNDHXwhBk+D9eBzQz2gS/EkuQFCkGwa

tP8W54RSiJqsOgw66HUcaHh/l1KhYeQGSvdjR+HMd/EKNal09hTnU834AGhWl//GqoSGLk27VI2rV7B3

gE5WiYwpl+iWVImfmtQQA4MGLqK0niGiFzDXf+wc2k
H5lEW1ZOXZxcMwYzuroK8XUWgVz8X44D6KfocIWiwZ2r8riGbtm9zjYdn2Krqxm9c6Eu9tsnQD9yW2A5

/9VWYufhl9RNhk3vcDVCflaWTnkAT5/SkazMq/7gfPBBEQlkhNXdliARaPcV9tR8CWipYJVa8rZBOp2+

ZKQLtqpqU4VJGpYb+DBWSMlGGgLWhZEJ3YcKEgJkwl
gHwmSEWPyxVtThUB5ICKKXuopzxH4PVp7qBtm/mcuzcWhr/quaTekT0KFikKfvCqd1TILD4AVrIUuMv7

iGSFDTs4eDWTk1PeU2JinqZ5q5xp6QX/jJf1XSZOBiOX1lX4i6FDV5S565Vh+y/gfs9PFymbsj4zhx0H

gJ1QIcjebUDpc+R6bIoy1cUPAFB/kCoY9zqB+SYSfU
6kRubzPWY3HNnB0jPekl+lJKJ/opP+gj1jJ8lksXIsytv5n7s4PF0V1acU4IHr+aYkjV3pEPB80PAa3Y

oVCnHXWPoPAlksV9qL0SkUgV+N/Cgpxe+UGg3UIytjVmg8yJ/qFaXo/JsHnoZP/gkGm5ewj7jiZR+GuB

Ilx+5lBuEGNFT6L0UYPVZiC2E/Uw8tdBZhU/EoiP4/
yAH/YGiGu+Wh8+4L7ZRHZkXtU0/jBc+ccIZOH5c6YMGhTc4YwOQkp0TM9ptKL0zqXm0d2hMiBI1t37Hl

WvjxjO9Edhw5h8gDAoLiFrlGxM/JbmjFKBQrogndFRhGQ20MKMA7sUY49gnlKKAWNMwPQ+S3+X5NG/EH

KTE2sM+mingaHw9Rt7PqqeT7eH5yhKXKGJB/GkDyEU
+rJb663crcS0P+dQhG3zxY/SBxY/aFhQSGaaVzwXlSKl5pudWKseR3r8zOOu6TMkdQwkyMtJfDmJYQta

/EADq/tYbm4LJVX+RHatI9VcIv+vagoelRjrtfFHnIIWsPWebQGVPCuqL/YcaPC43DmUiudVbrELveNV

xD+Hc9pc9RHbq6kl18AmOwDDoxWKqZEXx79poL2P8S
BEpRobwsF3+hf8DzSWEiozw4xtQSeiUFw6P0sA+QSUwtL9ev2PHR0TXMoAC7vpXgRfQL/nX6WrMiLuG7

3+toe0BfNIPNhIS0ajWyHIbDbpPG+Q9KbrzpKrL/M2x1E0xem3X2PrTsNSe9S+cH6mMcv/D/QbxxY825

rxNJX4leMcxtjR7F0IMySkQFqaSu5OryUy5YnxW/6e
fC2o+WAaKuegepn+2B8jhz1GAEx/6F1pO/0dr+pfHxDaq6golI5VZF9kxFqkFVH0nVDd9zLGK2CvnRnm

33RA+mYhs7dAJSMUitYIfFTfYaniujy2W/7aJRbO1QaLX2EZ34GZ0rM9tC3wnG9ib/FcdLNQ44XhZeW2

R1kV2CTXhuDmAknyVn5cfCmmEYK6/5N/+HAukhiTDv
8eFH/M770QuFYzhMZeLEywYx4KuKc9kLd6SRD6VqbvmLBzYT+awqqBmbZylZVOO89MHeRf0pAVuP+xa5

ruWgh/93Fx/78684eDJ08I+fVOSm5OGTXy7pQ67OjozxNsvkdzOqvX09mfNBccGPO6CdV4jZT60bVGfg

Q0jM0FPdBGiVFadLlWvkQGcN9SYVd69ifLTL+iEICC
6+Q7GS0bosZgBQ7itbDtO33zA8hbVi0Cuvv1mgDZYp6aQtCtN7Jb++YnGucWIF/MZJl+2ucOn93EY7pw

XLxlnOeA5u3tyTRb30l8XLs6bpy/GRiAoboXKAqo+Ta0k2YtEZ9lbYuBBvIySkk0Gbky8z+26slskbo0

hNViYRZxV8wEfm4e+JRV4M0LyYymmTlghI3171hsL2
DdhiXtwV7ilyWk/0PNOry3pbaeu2+phTn4YTFOl+ybNvXnNfa8MO0VKv5SXd4scOguVGLy0l4kp9AzmB

pOEjhgyQh2Znu55B3WLOsKcVvr3e63onCQG7tq2JqsMncTc+5+GeJ5cRdLJJAv/bQsxsWKGCD7L24/Fd

4EmcFtf2CT/z9HwSt70uVSxqZcxCtxBJCMaPuOpB5U
6DKnhHJ6vXtYOEK1V0doMzINc2ZO4LQfddLBEidi5XvH40fsBzvhLI+w5BUjc/I414EH7nEaBrv0+GEn

7m6MAbnCsT3Xr8UDUl1zESZkyoeeqz0ea0Y31+f6jQ5P+u0WitXH+E8f6+4QGGkk4p+H4Ley58+oYrYA

/Us4VsVs2nO3MHeQTda3ssZJ14VZqdWk5j5VlkNQ31
Hl5AIIe7rjtctKL0mBX2yA8ZqYpZJe9i32Qh8y4nQU0pdwFOnsEdDYnRcAAFD4fwiLyS0ir41P5j/travis

1HbhwaS1yE8/+STbfmJi7UdUHhc0WsqaCvaqzJlX7Kb+E+suRWd6Mv0hyhLCPIUG9dVKjj/a729tIspu

l7CxIKFo5uh8F/QAW14IzS/InrdKf6Dh8dN2BVE40+
9EBQd8lTZXi86YkSldAFaLuLgY9gkyTOYQFbQDAIN8vA40smshh0kfGueFtnGFEKz77W4lgD0D82KUZN

wCRBEgBtlPG/GLYEsXqMs4n6Rpdku6PNFxGU7G2WYt0Sxe4XYBTDvRHMj/o/a9HbxupwizpMgJCaIk7m

M4yaHq4z2qCzd6CTrAS5rOR1zwSqk8e/0mD8IvXSDy
BirtbWBPYLtwkXvHRPr86aUx/pgI7rK4pKgB8HYOBVfvvCjG/73PvPgt9QbD7w2RYmVVsg+FjyIjsd4j

u9QgBqjT9DL4VHVRVlWedIQv/vdh/w9ZgZI5LW9m8jQFY3BymMJzyM2fJaHOxUp1MNK5XeOAdDks3HDd

wdV4oXhRkvYQQQo/ANiyfPsMxLZcMbYKhCtUh4dwKA
8okLvXaoVtOAIEw4MwgaJ+oaMmzRGXnSVNY6oUGtR26ohNky04yYWRHfui6NxphdUSVw77P2mj2SItm7

kOw03cDWGe6d+dP95/3MGAXTtvA/ZI/OOm6/9UlQouMs7Kxe6k2RJtSWzUX7p7pQ3iXIrK7k1bT/JrnB

HpNCc4qoqhoskGtaNwY8YE7yc7RNOfLhEnoQZU1xF3
NzGXPRhzp5u2KDfKn9xBnH+3JsBnq98n8e9LRaHcK/dzGyge1zxAw9PaOCjatp+6hPqYlSQ2Ow17ZYRt

jLWhnr1dhFtunoXYmzUZ8JmJHYBKWUC331uSKEumX6Vu5ToPKnFuP/ndE7zwnwZaCJSjjM2u6+cXoaPx

fiIEEOx9careAV3gjHbzXBTg7ETegQYlL7NLQqFCc7
b3wE1/0kaVPncuRKNgyVnxVcrhHjECov+vn59L91nkk2WEAHa2qUUrQHbAtvZNUiPMr6J0V6flzHyguq

l+qcr9/WMIJ7zFxuZWBupDFhVWP7LjpvJS/apbhlTeTkh2Qgl9DcRR86/EarGCWP1jFf8JsT8r+asCDG

X5ftmfp/ApfDL240yOiR1VWyYIL1e6kg1YLuu+Y1Bt
4qy1eWfn+Yhw0iLndft8dhXL/gYQNtMWXpMeB9atiqgJT8Sw+TJQHu+Cfdgizwb3g+9B/n/wQon+nbW8

jf8xU681/ZgZ7nK4SzyxR/s+jE8Jxn4QBTLrrDeExUAJeqGnNSoM8+KccPEZPmWP0tbMpBpTSLahky8z

KeZwfwQGp4dxvSNAVI44YyEtHaYFEQP6XoKRHuVWyC
fyvov0KZiH04U33MTdTSnRrX1oFvfa41A15SuhZchQkZaTb0dwhX3RofI0A+stNLckXTlfyKbxNm7tSz

uQVpK+FC2r1xl5cMH9/P9yxxkQcOrldjbqtaPkLga0ryN3wjiIRi9iH59lRSnXioJiW8QbxGc6aLxiZj

zxrlPlhIAXFhd9h+u3K0e+Sw7BnOtv8JvXdbO6cZos
j/hnd6llLw5ziuA6cD+kinYfeOMCo8yjJnXyvBg9QLR2gmiK8Mkq6rFEYCDpymf1RaHWc7IUkvhbMyOn

GyfQQ9pWRfkBVznd3Tz+zSpzmOElZSv9sdk563imsEMrgdcp2HAtOKgcDgh6gERZ2Ehm8jIobJOmKPbp

03fezEkGIiVUmW/p768AocdBYqNJZPLetut1Mres2i
e8Jy+CvOIEfhfpFhfSaPTdSA8PjcSyB7TYyUsWWqQlkrpqiPLFptouDEKSXB2K/Z+PvPP+0mJhs7U/z8

dKUBxZCcfRDVK8F7wGlwMhZeBlHLGy8uG24aSJOmRCiUDLgXU7FMBfrV2eUROBIFRptlJL8KszS2l7/Q

t0AQ6F3WEFxyLLhktS4WcidtffZbumAuv53H4LXsHc
AiOCz0k2SrBxsdKXDQNXVzTpUP0lpSATLIkXxpGrDBYm9eef7xzvU2Ol1lMoEQBggfKaen944DMkaptC

AEvC2URxi+tPYCv+eSeOuvDo5DLHXX4CzB+3eWAJe4JhT6ZKs/N85eFwCJrx7ofkQfmABXoRvZgOiwUt

BfDMgoKYT7sucJ2gw1kpkSKsX3D/8aWNSUwMXUnA2t
ISpGrkOMx2MqguUJqg1M8Deiyb2rNpCxr0H6FT2X7DbeDYLXG8zReBpgn8+O2tLzH7BZw4wLWEyz+OoH

87bm2qhYz5lf1LXXGg4TS/+cns3pCDieCJgP0xklt9zvodG1GZQZ2wj9U8qW4XSUCb22DeZjSDhnwOfQ

hBcOYj9pmbC+IvBX3yFllpFtAOzeoZCHd8pPS+1VZ4
VLeuPlUXPQ2vOBXrl4PvsCrbtJC9MJviFQkVodRvC850TypvHgDBs25JMBoAu6EguhdSHQ8yosKRENXK

INzshtGM/UNpy4sOa2cYRJPS2eZRtLs6nlP0RZVYUfyv7GbYEuRj0O2ULkzyobJGfhO7JZM+hdKS+Ze+

xnxu1dT7bXfIhBwybzHvnwHhi0Xqf1T42BCXsJZNd4
I4cBmgAX8WK98fGgtYMLv4QNNG4Vz/T1cp/9qt2SMqsWpQpxRtjETsb4fCxGlf4IsBW6dX71EGh93qfS

Wu1O4wvue6MZfNT5II7KZchz0mWB8Ql8ZKHKUrRl2/sBeom/uuGOE1gpRaC/ptGnz5CetZ0GE/pGv8WW

m5w89FRfzVyYCxy0Y+FmjuAORiLMXDA3mC2rWRNgGC
Z8SryF6yEkq7wUP/DVVbcNha5pYiJE6hXA2vSgTbM0LRltngYYQCWhudDkI8x2pCOMEMo5DEFM3goosJ

zPPyp5vGEmEENZwytESqAixWim5brqNHpqr3nrg9TSOynmDUcBOM/mxvmkjXljA3zTWurPKYfFENKY/f

ceTj+xYuA5faT7BibHfJJn8vtuWIlZHOknqkXgD5D8
BG93L4AwqDu+ZaZVLj7DX4YHE2nRz+iNioj4oBpDE7XwNz3UC08aRnM0o6m0nu6F9VbwPUAlB/k7pVy6

ftXQTACOE9DlGQ7INSebbz+mAg9yHL1sJ6tpBjQaLZXOjwhVOEItm7HuOcLisBEDVF4CueSyfZ3megjV

CR4v0UoQFhTTsqKxRQoOrGqguP1z60QDs/7dc/jv5Y
vgKRPsyGPJ3kFe2wkML53yil4JmIPQ+THhi+oaC8l2xU+D6mDPudZGRRm3tKhbr/aqWMU2gQpeF/ZjSa

DolfScN4rUSlkBDUiiKfiuIdnPDCrbPDOTDHpQZtBXcHqFwWg19ix8S4Q98YFbvX/PJCvX8soVNnyVMM

VA0J723lwI/TL1Ziw7vcj28bDNiLCcFssG5HMixZSU
nPK8Kyi8fS61BsD5wwP4xCCkIWFTR7oRwB8gde4lRlnfCMuhLVCiu+xqCKsir28r8yzTzAl3emzpq9dq

amNvJ97HSegDfN34NkGxPUvMv6RPPHoXvuciGt5zFDC0xWuBeHdYRrl2VsNg1TPiaXXIQAodIEYmqUbc

lBx4RsKJZF84vJkqBDOYww5de8hyv46if66LTcosiD
rCs/4kgVJPYqaD6K6V6nEC3ssDYTRQJV1OxvP7BCqPzAYLCdyhmgj0E3l1Y8oLuOOoj84zJGWXbiuIxp

pkgHpx9pMP8bnPdhSAOalrf7eNWpH45XxpQMow8vPsECLk5AgoSO9/w5QFgnAdXeapVEC4762P8lGs45

jW8UAdI3OMhpeUJ9LIdOL63G7w2YtlAIIL9UZtYd2q
AKaRZXUPVQyQNOFe9xMFJtfkoYDwG3VDdwBGDRZyBvvLSAdCtwtzxRbSgm8PNq76BwvVnjp3ZEqL9H/d

QAS54Fu1sOialT8Mawmx6Z3QiJS6GYVjb6JcBOt2FBcE9/+LAoiFufjIxM7Chsg9lw62NP0bJOeB9/dM

tTKmjjM7cclr8iKc6k2Ucf86Dy6OrU2xAjJX6dnVmP
pdyRr3EoWd4YJLMnbrbsH/oOvkQZZGyyvtPgQ+yO6WU6QyF4la27hThi2P1K/xBCRqZIj0VHnA5f5OQL

oaiAja22eMI9damUVKbrJkRun2KHtlW0r4ga5tLPKvzNqvoU/v42roiQebbdA0AEpLejfChM3kFPTq5l

AxVyVCSG2zsQjiuzeIkXauWMdF4iVROzKUjniItSux
NQvB8fj116rfb18UYOwlGs7F2rv4s/SJLRo7regqlyb6qEz0Jps8CkyKkmhAFPDqp6YVPj6p3FM9vIdo

P3y1Qax/3KjfCgx4TXM5uIO8zGyZ40VzwetF0PHc2xZuNbOj8ohxfUbJQMkTjf9sokcnFxk6QCPql3ZJ

qrpCxwQZpdl5IuIf86oXO6g6SHO9yEs3GUWYrcSZpm
m4PqU3TsZQX/u3Rm91/jnNypqeXybUKBZKzmuKHFu6NhqJhS2eeQCYefipuw6AhJqZc2GsTVBdYeagT0

sPadEcprfSZRut3NBIKGpMCU0GThX5sOfCsAWscjLQrtkWN+I7f93gIRDjo1+YKEzxv07AbUp1SgEbiI

xaFxjTCwtu++qGByIsj3CcUPuF4kV1Sz8G9BNGpeNC
TR4qSOui85cbWTajDbH+t8p53NHqx7pLKjWaUHVXBGbxi9wqNbIyhjZGzUgdDAk5zD7H1p6Mbl9t1uTg

4Gv4Ityle50AAOResS8hWd9XoYA4/N0updn+MiiEpsauRZOOdCujnDSlqM5I3DubSDdzy33PdA1jHlmz

KBuLY15IxEVKVGfzRfV6lt5ulgFkcKmoLekGOcVIMu
hqsHovhwn5fowBc0soUaqKz+jBbCvgPg7LNXf1Lkkl98m2q+YoFho6qIE6OzN4w/dJqng2WMK83FhH7/

H9b1wC0M+xmjbvFLjvA3gBGUqJBvuMw8fxw83DKKfChXAMwpXXNoxCbbrpGQfwwEJqK/TiB0G+u5/KDD

icklIHSvdImeV4lqmAHrr6ebz7J2WPN/G3OHc1Y9WW
S8piyc8WTGgjCw0Pj5J1pQPAc4z6GOA5lQ9Cs8e0pCaTilFS5p+qyyvAE05iK7fuTvRbucz7+dtDg9ab

8rfO4P6vQ7M+cw8D0vCZt0HmlLUR5ywCBSFpDbe7doK4iJiNHeRKncsQFMsy+e7Cz58b3ca2LoEr7BJi

UZP6O2TqJzI0DwhN0aAlWSaua1vZP9Pw21qlm/QEbv
kkVxf7LU01vUDis3kL19SOFmM/tnhhGgj5WAAjGp/dGijfCTWr/EWK4L7bSXyssDXMnjZRdqKtljM+lm

XC5fRpCE8F/tG3/lDOLPLkLcKmLFWi3DgDz8lnWz2Vyff8o0ECKMuhn0L71a0WpR4hD2kPHCwHG/pDL4

V1GOQ6o7rB0lKO+9p0gMq+aUNtZZH/GHuRpMJ1W4DN
txTfcDPdSfITxTkedvKULnaHBc5DWpzGfmXUdWJ1wukudOPuygIcEuICI2/a9q+gj+tnnz3+BEIE+bfu

qRZn1xNoMna1u8c3Ix8bisyxuferPCTnurA4p3Oyolx0HZfWFjZLQKZWW/p8eRuCLia0OwnFCZQKujoq

/2nKdaQRZMVi0dyjX9q7WwDJ1EGs2v57u6RmT1Eb2a
VldiIWMVX1IHSlDRpOfvp+xB3AWFmZ15OZQ24x8EQneWavgxGTXz5lPfRp8646uaidA3QjOVaAoih0SJ

6WwROHaeqRk4J953DQ6pfl1E6JmP9JMpfPSJwfPkhCHgtiCBgSVA6qXrGaq0BviwXRumNz5ue9nv/XwS

snCQvLD9kfF9bwpH+yHEwx4gwKm9dVEWIruinIv3ty
tAP2FcQSoj/lrJNSgq569+oYOglV/qGUA6qR1gZTfkcGiNtTFhVxmzIKsuHXUg6Vebk1Afy4o8kLN5Mf

qVdGzhivSN0HFHH2xQtCsHVmGdoNNUQ3WZJ3gVSnfU9bUP3xs0SqciPztMEzO0ZX0bRfvh54j08b1miQ

QVtLLvQpJquMotQoQRrxJ19mbQxognumbkuQ+Srq9E
bXwj2kj4uXuAqgFYIbyZaXDJu49J3LmOqwGfkPNBLSAs5XcTrXtd2YwhiLtR6v8dLP9P0f7kdt0WD2wz

TzxzoOYu1bBKuDIW0DzbNN8vrRyqMdtvBg/GsNOw17VGKKrMSwwjr8ylDmIB3ixuTem+AiLsp/oi8T5k

6G0rn/CSUKPzItffGQ83y3yJ/01OIxGBux1TyoQMj9
PsMdOfrcWe0TXFfdFhIvRoQ8n8lTnDNp5mODquGpVML31DNefNA2QjYQ2hdV5ucn3cP09hc9VzSgtgZK

+7HnZn7uDjCZdZDX80SRjoF6fenEVarkpNgFNoQs5lwkf43QhYwfJRxiPd12AwwzgY50nK9n3QSzNZro

uoz3KvKm0Ihc7/JcXCHdkY/4rWrxH/AYPlz2IyGOnl
+fYeweWLuUy6qSuj03ha4xqecYLI3Nef8uMs80CZfvA2u2yMIA5DjSmViTYs+vqmIQ7nk+Nb8/LYW66l

HhrpPOLshMZ7dEM4FP3j3AfP0j4L2CUlimnd4ERWtniE4l+dvDQekd6ArA2hw5zKgW/OrVHKMUrClh61

0eLRBs9BlE2eY13nViiriNoH5mp6BXef2rvS4WDjW9
gI8RVTgY3TwoeUX7V9w0TheNA2skOdbFkzn2hrifTskkyfzDOiBj+l2nTg+r86bk3iuzoO0F0haSHmZ0

9X46eEsslTz/jMzTQXigu5YCZpQ/ALSiwKDRf+wCzu2pvILBn2bMFBa0HcmQgwYvVETgydipDqv0Nfcy

KnzZlH6VWnnWjdFlGYFYd4Olu9smOXNEAbcyiZ8wot
2gKLj1POIG4IgAy+pnv+dAQpgP5h0ea1Lv7xFpnevBQyBKj7pnUUBTAi6Q120t39Oak7S2Ct2gyEb4O8

B12L3irmoe2zlqyt4rHpWj/6rNmICso1JTjbcPlUw4KshwhcLI8gjitbiO0Zxpz4QlGmHeLNT1tiCKSX

iNqmqeq3rBnT5IIPWQLgjszHxYbRTEhxNipOaIKC1e
WkYiDimmMRCV2qy+TTz+JvT1KeqReN9A9SuJ9nllhAz4ZWigstYSvENXpVQ4josx2KIBYY6ktyxyfIv0

VhtGWrTuGzm/+dxKu7RpK7/aObT5xxpU2d2YP/PgK//ggZNNa4ofIgvKluebL+uKl0gABj1HNwsGHbAy

pTNkcqASRQSq5+4KttKspJxN1CVMZ9NqGiucoe3v5A
aZxInMpP14fWpkJytSxEB/rBEDwnufYUdvXMiOUglPv0A1fX0BpEg+p3wHyC1bXVcNHSUGFgRNTYk226

6LYCsQNZn6WVomTJSUxj2nOC5VGQ9bJFD/FAnPyDxxcxH1ZKGq1ly7Blde331oMx0oiTdf2DO1nX0NDc

vKHkGOhaw/PiuJoq44lGolxZ04jGz92w8KpLPaQ19Z
5zWOvh53/I8sH7wCJZHSScxPP3PZ5GFdj7YkBmUJ0QfMPZZl2aM/KckP2XDPgxRdJujNyH/We6Isv1G2

5SkcnuFMN7s+VRRix5ypqM4nKxQZo7Go5Vs7Ei8GPvvxSFRx0422n5zBKZYN0+MWdOOsPIgdoXuuS0Ii

HzgSETsRdSvnAmlsvqFACzxLzToDyjlEu3+N9LeixY
/H6EPNsi63+J7bX3LTMCCffLjQbxWPGJoQ0bBoHp6WvNTUc54JTEAMVYlJuyFGhqoM0WHiAgp4ub/bns

OXhBpHly2gFwX87yE7L1ML0QkQnhqKYglpFad5GqY4uFFAa5ZwbALmWFEwvUXiDgWgN8k0qr3ffRCxxw

SnWWrrzldPRLEPJ4S+TUV7D/d9SAa86dcEWXTObYUI
Bj6njJ/daX4+gbjpxj+NC6+O10Yjfm6+aJwF9gACOnIrDTp5zO9mTp78ZaFjeKZhkwuZn3ng4tAptKsq

8HruqBHJWjowJ62PKaWMEfsCy98wM9C3wDPsCaLbXCi+wEum/7SZK3lCe08fyQ9pTft2FhI3ASXxzclp

8kSmCDrC0sr3BOcDjST/gmI3WfjXZE6AKZRMJNbGRT
XlzKAtdi2Oe4L4JO1xClJpZeX56EoeoRhN+xyK133O8mCf/xWc5HzeRAtcOFSrK+PHRdypz/+qMjTSwN

Wb/3vAdaadQWN/rFjca+hr8EBwO2yXHvEEiithg3JFASAMUT5lHFY1ohJW5yWgKPUeGhE3TjDu+HLmGV

RjH2yEAQmkjcL3yWrfjwOC/ueqFgHAZ0C2d5NuiptC
hAqSDDLhk1dv7hzq91ndCLiV4s49MhRRQSbnpWYycZSmnWD8+Umaj2m1d6yF43j6TkIDLCYFgS7A7K9L

JuilTHLzeR/YhRtBuCtqtTYrfXzm20gpcHS8vdOJkf5hlrMInPnCp1OKG3JuTjcEK4xpCd8anhw9LxDC

AhC02NszSM67tbxo83rQQTQEg7jw3fFiu6CgoBqeOM
KFB89tS30H2nUhtihCXaNBmuon79PENJyCqsV7E0cMHcmvbo4kHdPBwcuwoolzCdg2gia463jVn1BYxa

q7oWhxAAshuMKtQsDVoVipZlj5pXP4WiDdHjgznqUdfWxlGz32AO7p+zpdLVyUtnwb+60DIDq2/Ox1h1

wiFNBODaLDxzYK5WRujOflBbneX/oLo5PITNDPYLmu
FLkFSDyKjsfvTyCh8DnrJiMxujZXKkwUVuuqfxT1S31ZbUkNMCHUZJ5TIDAglsnTjrMlOxtvvPpIc6nD

NYvccfn4KLoAY8LWaLpsEvT3fSWjqSlg4kozaDMfoJhzElz5pePwGiMgb1E4aD7WO+VhNbV/I7wX+krZ

YRqEQ3bz3MRL+ZuF9pHggTAyqPEE4+9XHgtaHhtfdH
3tQcs+xvc8xTuT4hggMbR8n8MvvWvEUzyp3MG3w1UKMqywGzRQBqTO8pMUpU3ejr5yV7GU5fl/qyO3GI

sWF4GZSom+aLBuUuQqs8LUPUn+f36UxnSKlhSl8NMmOUW1WZh5thTD3Ecq40eu7nIya8uEGgAjOwMK4F

v2nOOzI4a1zzxJdF82QOk6uEkYustb9mh3pG8i38Am
1iSNuCbUQg6HVc3CIP9lspqJvdoK4aTstzNz9yfOa/suf5X63N1xf92RLwT/eUqzrqvz0nP7IZV+ftLQ

AsNfF0tXvPxM27GtVECzhoeKZzSZl42xWz81Pv6rXbl3yhoNG9e3BiwpVxDeywnx9vt8C+nXjpblgr0L

+IZfCCFHW1PmSy9T5LnUsqaQB4hIjV/QegFJ3DRZpi
bRPoC88sH3fY8YY2S9T0hUEcoHJehD0fQBaIqnisGX/unRqVLqSfbxMo0fNDeMJYgoOq3zPGZIjSHbOA

uzakQd95fGlWJjCLva0XQDEyjjhYvauovQbY/X5qY9JVDcLerMwK48V8dG2AxrLXWWoGLAipIwcNOuGQ

VuWUCWGqIBg0G7fb1oylQigdjJBlZWrW7whZs4G+gp
ZGALv1ttadDpjlXkH6Wv60yoIEn4LOauEMsFTCFs28KNYHj90EPxAfLtpoaaDSMjJzmDAb0vHJcPdWbo

Q0QGpTb+zzv/vF2Vxsd+y3HVivA55uuOz/ocJmH2V+vrroXoA2zF9rsZUsOxann7nJ4d61vmxaNnSK3a

XKnPWLwFdfyYcVaPyC0b3RnoLoHpG3OLSRKw4FTMhi
1IKjmHDNZzFjG04b8htyFLbOpD/p+3y08AoCExgcjiKKJe57iwMXyMy4das8Z1Ti6Wno3Vyfrddn9R2W

jYyPrdarR022OZYRvq1tCpMNgocVv7BfSd2u4hW6AUKJg+Fk5yG+xJqkPSn7v0uHEp9e8Q05zQzBWb9a

UXPaYHR3EUfcCFivKFLwpITaiF3ipFeULt3Cog3LZ7
3zpCSVc2uH8S8SJI300YkhJhqjixm5qbTLlRraJV/1fehlV8DyoAusz43vyLKyIpnSqnCUGZ8+vCjFuB

nkwMCHlPTRSltYsnNXi7ZV2Y7DohmPHEGt4LliBQunomp55I25PV6BHaANzga0I03Sp6qjkI65r5TDsY

rFYY0kRmGxKZF1cm48N411eqUDORekOKIo9yPNTW6+
dMgrxRPamJOq2hu22uD/jzan/9nlRWW/u2Ccu907AyT1N/IjzeRvABVzzJjDIFx0/t0DnVH/S79vk6gL

vjzcDEH9zRtbItk4A+aJbXCDZC4Yw9NxTjta7QYQIJzx7VNzSrMdIM2J6tyWFVheiKqG1ct6kmdKbcV9

nPECxtQgszRU/gF88QX8P+JS/+xvrKAu5+9DdlOu/9
W1l/+/ioZipixRal229/9V2KMa8wOzTwWq+isiGx2EnVhefPmW63QBWfB72TbwYg6MvVVAKhfepmxPOz

0BroJvS0L15UyF1YQkjlJfj1O+aok3F7YrJVqXgrJoO4XXjrJ4FMggVt9xAvhANP/bhGHgaycFVJeLZ+

P3Pg2dFghanCwxMkwMNQACeVjRSMZc18vcWtWWVZ8d
0yKiHk15N0hBhfeHIvsF6VzS0qpfx1WjzLuQyC+okL4xTHnaeI+ihCC+oQgjGq3DhE3AO7b3Szzs4nxA

aZB+e9BuJwz89dR5zeOwyPDb9gHjKT+vFzsToE653jDYA/fKQa2I3cC5PMgSQgZinzyy/TaFAJvgAFfP

TB2rFIpVthtNhykUSBtp6eCIaxEmAnp8dyp64Z3kAx
Nuk/V9a6sTDpFHn6+9rrw2tBF3Uckps5dh9NTnkbmfGRdeULFLRTuvQ9EEWJYogcJnfmV2dG9zDOQLEz

eT8n26IHehoYa/XVoMYlvHJYAMvF3QALc0yHHYa9WNr/IvlLPSab98QVKOePr+BQTeGeEH7EilYod85D

uUg3bmI/tyu8g13yXsag5kpBuuyqz6SOlJPW03NCDh
wCie1Hqe0F9XKwnhMsWWXSWCbeQR3bWgZEgxkoVegkSzqs6a+zLwWiAnbURV3W+fa5SVnxbfs9z5VW7k

Pc2TbkMGCJfivXBOxQhDCy3bxaXgYR77PzoI7yI4K/fK2VshYKNlYy2dfzCnZAbn5ujyxk47N5hZebgU

MhRpBK4bEVvamuDToPUM1M1RW6kNko0Tsq8/fHKffL
C+A54qPA8luVpGX8ff8GV+kPCg/ZaGgP+EIsBK4mm1YDsLfx7yu06YSojbXFM/JjagKljmMMCFGsJJ8Z

VJNxcV+Vf3PtvJKJnBVfIk0Qu6xNQ6vA5oRFrqMQTZYWFSal9/jUMEL8UuYWsrZOE+G9dXuvmGWC3wZk

3ZWR0s+QdV+K75P+pi+Rt50Iv+axML+deHfyUO6+9u
Jxpx3ExLlif9RVrRgCzT4ZCyWD/NVrH52BUc5erwkCPV9UKEjsP2leWU+5BL1fsgzF0sQFx4n0000lV5

atpbLx4ZEbgQhqhTHS9YrdIYSgr3A3WPVHtJjFL6DKDxl9BAyZlpoRtW/S6sbr8VvY4NDbVgywotjG5P

FhN8yhFEo9diM5ho5VKBBQyPtzsQnojok4s+cL2lrn
YLkhZQlmhlktlOH2pRdu08ocn69mYxjczAbDI/jNgEGI/vsydjFItbHWt18bcbEudQN0hz6fh1OTr5LF

Tsl0g6jqm6zfNL0X7TjahgD1ZsV0bNb5cDeE94J8Iu6ojWpjF4lyj3vZoyr9ZkBXnHOM4FTAzAtw9pCq

yofZ2Zu+PQ0kmyG89IcmjLhGUrq+XTPjqIHumNTtOv
/eZVSExVpFweqfDFGfe1RouXUa5o39bc6Ov1HOwv5I6JJ+hlDNiWkq7TaYUKmzy3DlNAWuS7OMeo8R1T

eZEGxX1vLaOcY14Y1Rm0aHj8MpPUc9vZWAUym9l8uIRGt4KxyzN3EEih8+nnod4ezbgXiKtwTbBpz2El

WfU0FPN8qbV7yt97cJEVyHygbWIH32Ic4k2ggg+c6N
77edt4XRJUvn4fgYnzDZgMjAPEmxWZyrDo+x3t1OTDFjXAIZdymFR2hAPj8UmiV2OcTuz3gxrayjrZOc

k6jxdyLOjcs3lxtbc165atK786PPz9++Yj1IVjQ/Cp1H9y6BhN6Emduy1+ers5e8gI7l1/Wdf4+o/u3D

8JA+wXYWh+F0DbQ//ijSmXvit81raEmJh0ZYAVAkXS
yYV8Pce0LXRfdvrvSEm+2R6EjT9iaB8qO0ba2nOLb1J4AoL628VZFD4fi5pypNsd8KmBltmPbyO+12tm

dX3Gy8jom+8EYFIlYgIxTVpSn/2Pss563+8RlqB2OGFEkBULlLHj9wX7HcMt/d7HHjAzXRSjT0yWpe2k

zwi9LxqZDD3FBW2lyhWZclo6pQWkctQssoC5x6mOjE
h44GgZGGpCVE7G6wRbtF4+eubkWPYD27V8T6YiAoSCgwrMJTz4mmUN0EoK0m0eF+s5q61miIczjqOGTr

SlF2umlNntyFTSHbx6DctetAA5noozYqpu+Ew5OugFJE4mLr8yIwxRYEvGoAE9jCa7yqDy8SonvHOPJO

NIzo709z7dBJIwmtmgVfnv/c0rAyCdXG2j18Om2SqF
z9ybA7G9dwn4gD+LMdhyWfMl07aaddnlrOM2zxTMr9F34tXgH1uuTCN+yk31ugY+Wh98/Qjd9l1uRk7a

zfmNa/nD3DqCq30B88aTDKf7SqYCl0JRd4wwbzv7qqZhq3qXKOSNa7PAmGJmUyOdHSIQaagut2aYBudI

0zaawjz45LSKQWlefrYI7uaLcAF3bKqSYoO6guiMHs
svIsILlLBym+2AKPc4glN12Ptku1NIyPvu/4cRqP+1e75ulP/0pe8C/cXi8Hyj+NjROmy78URnXmheU5

vjKHcVxH47OudXL4axI9kVn38gGEJhUi6Wf4wpn4YcI/NSdMGxqpq8EHdhOSc5ajD3cYXk+zqnF703sE

ArJFw4sGaTVr0XjIZ/JtvBLRNl2SpENegxWROerHWc
8wUbJ/wJqFzGt0cDCmtmLsuDm0Il1LbC49+L+EMCug5qboyfOQjGPmpsCsbaHkIxt3PxoBLjJ9FFUaUR

hhB7JpZlCwRCSxAD+N0vpnEQHBYqE8ZG/Icn2pU/8kUz6foanYfbN2Spj9+TU5ZMCCIh3ZskY/n6Vu6p

aOrPjs8rEiCKcLFIDln0bRE0ravK5YaaO+w2g9MDyv
lUmLTjN5NOCNxGDeoLvpqrYfE9PnFyHAEpk36LYZa+v7Bnl7JuI47EACb9zgAn/o2kEkegQmAGi1fY7K

l0/cSj+JbkWOebiYOuuNWWDEUWl4XENa6Px4EJRsVOMJnMF/OzrYmODZ9i0Huv26VTwJSqkFWHYLPXjl

t9Oi9nCT7mP4kiiTQY2cGuARn7yUPfbtHUNeOmE7SV
PsxSjxWv3bTtWIg/BaJGQdolLi4NbBfq3CC+a5mVWGoG1wHZv+gXrulqvy2qLFknNz+3odMp8plSdj3E

Qcb3pKZTxqNB7yrefkYIjlDBsTbzkaKpQdX9TGcULhJs6kDfs1uV+pUie8SmB23/EBFpMDX3eDWDPTGH

wtwRTws/yEI7sB5GaZNA6TA0poC3LAtpDDlvIDhVYc
CAgSvMxhrIcNWRSk0vAULDGi2NGeodjHr5n+prKHk3lE8ZgP1qsKtdED27h0V62TEW2r3O/E2NvmTFZ6

E/9TlsnDZWOarga7V8Bx+wqc3BcbTmqXhIK91/Y4iXFpHm2cXkEfAQW7FK5V3u7SAQwcTbADAVebqHHX

tVGo6gWzuljnT/zABrIoilT6eX+2En65F77u1hNTQB
4+zlSphiUq2VCnpe68SMhJvb1qBdsrYC2xzcZnm8j+xEl/wwIuQoljdHqrV0JMmW401DBOvyYOWZaEEO

mXADQcBX/iOpNYSBv91k795yvWNi1Ox+eYycqhWwQx38aTxi6o/RlMFhGXQrbGkp6XWSvvGgHipQV+SK

+dBhVifMgXIh20wesndQhX/x6s0BerLShC91Re3iyF
tfvvZSlWFHK/aLQFu2VjjWZXn7CvoMMWTmCWOnPcyV1fzzPWjAfKxEY/oqdeOp1fjSnkffhnUH6GCGm8

8M1s5+arQnW+wiE24D35mhrldQ6kYr1/bKg+pTlfYyeabVf5aGZyJrTipEQyzu+VBKnPzzSlJHfiQRxw

pEtJnRqG+TrfT/TlZtEbyO8Mlz+k9eJHX9SLsojH5k
6DQNyCM5PbbUjuwoXV8/awzaF7apW0KArRTFeF2R3hVNZPrso1RnWy9ULgjxqF0jRiLLDNUi4j1tMD+C

pvfohIWX6YdmXwg1PqInGItGOxrrMuD43hrgEZj3kXOGVVqciWLr4WeIbHSMdAGDQgs+E+dOzfFlJTpI

Mg++fybSi4n6FwvL+l6Sdxao/3AziMpg0y/ogmTp9L
9r8AomRp68/LLoKfFVbaoBlzkBsbbkRHDtK+3Lap6BmnkDf055oP63wu6DRY5IZUZniRwTvZsZjdVUu4

dPWhE4fVl4x8R/f1aHQNqRz+nKe8/S+Ui01g2WUOriA9f1vTUsaj/no0CSGfxRcHKGg7jBq9okckbAks

IrOzbAr3v4W6NV52PUIsMsmZvcoSr6e8nJVVq3Yq1U
HQ1s55O89nPDEpcexFWHgfwjH5KS3EHdyn+ZEY1QAUJbBKWPkNrY9CfHG7u//tjEiq/FCCvdSuHOmEqN

MvG3i8B+rPJqzo9qao1K+h6KQSXDtwJgcWzSUvRF9GuIj+L0Cq4Bid/+0nsgunxkDW0Qdg2Mg98ChNhw

yv9TouB/LSdCnbrIAaJUTZXqQf30/6N5iQnfNOrn4M
3Wt6U2NGW0hHCWEAjh8xAWItebBYLmsPTvIfH7bgyIEBmsAAy4TfPDa0h1kCOPFZwVAwg2ZyFzl+CSW5

M9QUdHaoS5qPivF3UeXYgBitsLITWI7U6L487yOzk5KeTXWks31I+una2NaapqY7oOjfyZf66esqTvX5

4kE55SzoGd08THU63BLRAmjH1uFZy9z9BKPB6kmcPA
B6KM9zNnJ8yt+boN097p+ITxLmZPQj5qd/SLKkjstsuCnpRC5+8ENIf3/+yLw593/8fyUtKHr+ePx81m

5pxv/F/YrP+/eUhG/qrmh2F/7F8wieMAYYAllDZjCJe9KK8hrwIXfpGDV1wodYkpW/EUgfELEgFM/2Xt

5RJgV8g/aCqejbwgLmb8KQ2hMj2XcOatWwePo6zx9L
KGn8mNk7vryEbEFWGoAWdD1GT66DGhPmc27pmgkzjXnf+PjqqCxvXeGdFn9A37FcpvNbnrsOk90Pa2vm

TEcWNTQ3ZcnbC3m5h5rdxy2pAoskn+it3J3NN0X98ydgZLkW0foKK4F+g2lhQ5K4jWxbt5f136uFA2er

xAPOLwc4CEzTyPuGS/Jq/QuWulQ2T3r+EWJWqG8+ZA
IjBe3tAErGZHhymecIm+P9zdCQOIih3wVAuEc8MBt+DZ0KZupremwDg864hGjMYWRpqQtQG9/eMlO2Ci

bdNTFaGZgJl1vNr25yXk9te9d5yDKsVeC4NfmevXslCjrY8BwasfA5KlZBAC0sPTJL74krPe1OTrwABF

VfwdMlHI6BTsE/yPcw8xMHY0kvt4xFky1n4up74HQE
sAMQqg0n29to+CbHXCwv99sKZ9zTpuk6TkqVyPsdMXyHH5X3C8PwOfMNkDvfrkW9zJQfkioRwT1DJygI

9JVQ47SUUqA4D3jEg452x0MGcBZkf27nrzLEeBdOeW7Zp31BMGz7jUA5lVdCk3AdzAeKmtTJOdgFM9Ma

bTspWE8cs499er3XR7rAtVakjihTZVS0rg71TnBYHN
5H7Ohs4+eBHIrEXTdb9o7yvHM/iqWhQBQ4rONRzIxCQT2WGXA3/Kr3Mwri3+CwBLckgu3u7O+K/sdPxp

IX+1oCs/Uu047gP7k18P45aPZCQ8VYd+D7LM00XNOrXx/E5Q/zgrZPXNn7/xWrKyUI/KqIeAaZZjwvJo

6SUtkWJfsuRAHsLq8qw512llrjt1oiOnjrYEeMzPyy
GSPplTd2xOdAlb1C3hfacLNDiqqy19y+MwBMj3hkO08vScY1TnXvRJkaQfxJAbMX2Ql1CftDli1z7Cn2

m8h0Spr3SJgRkGeXOk2paFJk5oTtmaVeWOrV7t8bL7DZGORNaMmoSeltlMJWFcppOgONkXpv5YYherKA

FXtlQOZukUIr9mN0t5j226fskT+7lf17zejYhy/OCe
KHOb44BLJ7Z+9ctUdflWiKL3M1kfMc/W+AJ+D2X5wuePCIacouE7X7R+XaWXnJwzk/auPG00Mkuealsr

NyJQBckKGpFu6PyNKGwzna5FevofGvk0v65gdJUpU7qMv4FNHjg4zMKC4uEE9xkZQ3IdlO6SC0+PpaqM

WmICHIbTsVd3HSCfITiByIvCBrXdWqJN9Oy5IzouL1
n+4i+wvr3MRksuHFjK3z3uP6L5xkPww7BzfJJF873YjijZOqvVMSEE8CaPj084ap9fkkqDLa7BOd4OMR

Cf7cmVHPGY6sFaUE7Z0MNI2oJnxDtKrDt0EXijMuyBmmV+G6fErsPsavEDap4Us7cl8k+0kP/1Wou2M1

Z1O67KcVn+aULg1GN1bsfyW5Q83/x29zg04Ut/7ETF
/urxZIka8JQPjYNd+sDqTdgnCd+fCaWz8/4XA02kQh1KQInyW6uoWaj10+wojSgPGCY21cP+N6dwBnjH

lHbFPP0f/Xxr1JmS52rLh+xy/sjLRlPKx3uWEfvgW97ktSNzksunXV5foDIMGNFwhlWf5QJA0x8q35Fo

DX82kr8mX/BiIwHQGXn1hGihywGkCFzdjT/EV2anYn
CjHhrGn9CSGH4Jhze2ukl3pyEKKJA17YIztD1cqud/bV4J4U7AROK5kZVJ3yufaA3T6JpGKz5fK5CCNc

21aASzBTU2hO0QIdHRHJMAzg2+ucDK7+WTNlG0WwLT5Fxw2I3BtBgFoWkkeiiiPOqvVCTLf04q9uMrz2

7c7JuFb7WxOzY2UT3HBTVmTaQIHOwpSA2OsBIBeI8C
jF+aCo3eeGpz1OEJG4oVEyA2L+4hejR25glD+B2uGvtNPkjn/UCcoh5ShKv5cEduBEDJ6xdNddN8kg+W

SCcYnG0ACCHtQXx7mw7MnDatmtHbr1Lx1CQYMBt6+Bqk+FTgTl7DhHumCvRBBlkk3cTlo6ti0PfVFWmw

j9BpnBM1RaxFjEbFPSllAWYEcPSDeXFf7XcQj4g6+S
OQvNkvIMWcrClG/90EF9H/Q5K0ZHS/idreo88j06j+s/l4j7LpGoUy8s9Pv23IYjgqpwEdji7SVz5rU2

CENJz9MUiQOxWIap8N3VxUWUZ5UU/wyiPPfkRAzWla7tc7+W0so0nk5XU6JVAuyzV4GwohLjnspJmM8i

kgbtxEFpBSud4Fl1iWC3cTE/RnTbNKYk1cUtd9CqYT
l7cjBR03UpCaiHB4ghk/0/xRanhEqQMwB2SvmTQm5YCPb+kZe8t7F2c7/pmBscBYBM4ywakVUg6Qz3oT

UBRnVQiPPKkisbv/NJczmVkRTDzEu5uXcFBfiL9ironT6ZrIqNIhyE3AQxjIUHrfM5eNojpK2bAGy7s4

yniEi89iSCzvGYKxJLtWgbyI7Y4cBfD7GrJTVqMfRr
uXshlnb9HNtxTXZ08MdB3uzZhfWWVdmoZ0PHDNssymd1BeG+AbFa4Hc0W9QkNH4AR6el1B5ezfQDIxsZ

eXNSuUJbu/omv8mMNnTPNVTxyLaG7XfgdKRNsxgydQxHe/r2caskOcUc+XP7FMG8QKEJDCud8LriLH1W

5vpHs6ygWg+XfP1USXJGOqGUkTs4Or7SEzKurF7lnR
Metlhx7KS0k3d+MgHo7qV/Qy6lAThnCAG0GAxN/tQUfX13liigN5gmUw8lsfs1eEYDiecX5brKY9XOMV

TdzrzE7WIowO9+nJqraTOT4Fy8Uv9pASbIIpxpectE3nl2B4uEAwSSsov+4Agz0Wsu9ljd24npAvfR3b

zOP2m8s1JRqTVk00KtdTMlYI1wKsRwLwy3cTlnkDZW
+CcYrVj/jXc8EwgmvyriKMJ2UmctguV6EQK4+a+LJRrUzmAGVRFit5T+wis8e6hOjGay8yYUU11mmz9c

dVAr01N2t+6f4HhWOjUGNR7QRwZt2XW8VvT8Rr7IbL+oj1kHkP5ZSvyW9mFpoYn/mM75lgz8AXgsjjRj

tfdTGgzOHCjwJ8AR1zzLj9EAJWUAGcB7zRtLWTZtfx
ySv2spaztZrHb5KXqtJ5apovtC7q2Dytaj24tkpaUKDuGBkbGCA4dwY80mL1nMcu4ge48VdSabduFR9+

sch8SrFll8ZVl8+FgMEENDyUAov66lXdT/cl6OnS/UVg7QKmHcEXLsySd8Zk7hyM0mlPwNApZ0sSWsVL

qFTVe67KH5UItkXpJLJseUYRIHry22my31g5ex5t2s
dAAWgtL568o9B8qujDwFGldH/1OURZ12SrsvW0dQnbn+o4LYr0OMCV9GZ0Sng7nwFlAUTq5KRJbxHMcf

9xrr0VngrngDOd5HD9gLWPTLg6a1UtnrforZLKTwwp+U2Ugieliaz6pl+R6Ey0x/NUC8O44is1Z+ptXR

wqsa8lhBbRX1D2hHOUIWlHyrvJprl/eB2ERK7hKhRo
PCblhlNrBUREHphclwn56ZLtt9NhFfAn1ZOiD/9RimaTwZfYlqWmLP7hFQ8Hui1+z8BfZDBHLdvKeHKQ

HNXLldFclTHFacUDKJRZvPwo7KnkJeo1Bp/DuPy9RJbwILiNVXXOvbVo0ikIChrHq/GkfFFeXdaLnX6E

/fxgbTDU+uG9ftuPrd927MJ0ve6qP84siavlZOeMjg
jC8NdV/Wfyydzrb0//HZj/WFb+2Z9Mjdg8AJ9TYc4VcbuKftxBfIL4LdQlQ1dy7gUK0mhR+wA0bb3D6b

wwGl1PBCk6hDUrpkIVVZuP7BQiQ+Qvwp2k7RKkp/Qr4zDFFWFHOkyJ1KzytTlJpGJWvThmZYTODPKSZW

tlQ2/N+3fN/RDGzRxC1APdul3Pm98TJ/quOunKuLKh
ZlJlA7dEfLk1fGTr0x9ba6mOGOY7HWpaKYlYXu2O4a2ay5OAUbAcp+z2YU7RdXjU4kzhdlBJkmUKGwje

qDyhgUjGtrpH2tFU6hw8SgWwFYyjNXiDr9lV+JIfrC0zhy8ctGl79QyIPtbLtuNBN8pEuFYKoeLjOCJ4

v0TlCF7uqq+KQiL1fzwML0N6Iiyhfsteda7jQFquNH
MXn+mroEh4kCQdw/O//yI3mnUv2WV4R4QxtygDdJ1Yk95jz/a4CHSmNkfzfo0mFP2qRxyNm0eIAPxegO

9VqFmv6gWWTllVE62LfC2sI7oqm4W6AsnkDi2PuhBpu9v6HrHV/e3Etdmt/UHOslh+1zUcJMqlRulbd7

O5nYbV6CuaTd4QlWxiIelvRrrhkH0146UxeJZX6KQc
ANyF5oAIMEesLPfqsMlyvT7fqr4ERjyEC4iNlsi5LtpUM7XYabZcvl7yz0emkl1qUN6nZm2VKxpk3CzZ

+kt2Y7r4/Ikyfs0Yzs+zS/RF4ua5J0s6pw27iFC+ZYRVgZXlfFUafXvTrI3NO8N9KAkfxk/A6XC6Xi5j

sPxBb4slJNnLczCJ3GOg0NTBfk7eT31fzb14KIQ2Qu
0RT6zs3zIQe2zvq8vJQ7ODWDMU1dEsZsXPkxdiesWCzRI1b9qh5erNuedBb02n0NiiWHMWejq0gBEY1N

VRAPxQhhr5wa+4Y5O9hAnrSL72SQqGU+GD6g1yNIkZlR05+DhCnjQcYSGM/2EArl+16b5QDANzUhBYs9

yy81Af42cl3TFQDu74Ewb4Pqy05JF9W/hm9H1NzJSs
pr0mn23WEpM31yf3MwH0wuYcL4nZoT7iCQmjMgdrKJYUbfAPy0p/P3sS9AFrTfsM9idbFYfGTUL4XwD5

Drw7Ci/vfM25swKR90PfwLTN0EQgE0TcyzO8h54m/LC0T6aiVmwzN7ai/bzpX7pt0wMe7r7/DCjRADBr

9UIS6wFFz5YzFxihq/QnayT/jI4qdNqizsTdW4DR7h
jYsBWqppEaS3JXDOhLcwiW4zp4Cq/vKxC3XpynepsSHlImvZfG4f0x+AITwFHjCI44+IoWFlnzyJKkZH

q6/jhMZq8ODcAL+Tw8CzF2fKsuCcdJrS3so3/fZ6F3/nRKZq/NEt7x7M+zlMG5FyhAIQbraeH7Q6SLTx

7s3Ntnet45qQwhAsFgEgXqseLAuK3RF+usv88lGBam
NTHB1EKlDAnI/Fk/685f2y3inI3cP4kTl08/gYanVpVmjj2AgMtdBooA4V4iJYOByt1/kUH4ry8o71WP

gGFVS3VHmIwJl2C5gHFd6eUpWFYBSE0F4UneIkBIVY3+MFbTlVn+cF2rz7eU/RV6/lbA9HbyoQnm97/Q

wuPOtWSr5Jctv3EbzFE4JvJeFrMSqqB5g1czD/szrk
DcSiphSJ4d68sn4+Fa/i9Z8MQzblw4IvRuF1rVrkzYUQKFq2ZVTNDoE0nV5+TPlEgLg7W8BRJ5DZG5T9

cQK0accUUA048gqa0udX5Gt8mn/p5N+LmofstrpGZF3+zYdaZ04jOrPH6JX9VZr264ASmZOl2rFPVNsN

vhlWes7/W+ZMW6e2KsPEtmhWxcDmfCqUxjn6gDIj9e
iAagDDE02PfeUmWmyA3+G5OOo2yfssUGFkEkkPfrStP7QNYiyV3ARzw43dvkyVR7Gjlzyzf3Dgawfs/K

sqHa7NdU6MoOH6R+nQDVj3QcU0Hu5wCfLjJgWmR6gogh3Y5E0yjcdcRSMaJYWmf8Kdm0RLzUHBn5BCaY

fi0So8iC1I1EXPPQvJ2QOm8/QqYKIQaPqy9foLvwn4
I2anFOrJjbC8ZkB+tWHjiZ8JI7mWfo3X0VAhgq+4Ni30xAhq4+RlG8d5ovK/27bNG8C7e5xWbzSsXY2q

IZ2sQ6hp8fyTTdVxe6plzBY2peOVphSobZ/NbhdhtfpBn0sD2//AwLATMtMrOm6tRvvc51tXW/nv8C18

nE3wn+tz8M6gQwaYDaSKoZcRli+Dw9bLY+DUCxbyqU
rGeJ6wWoy9jjxzqTULW823Yy28Zb0/8RB00d9Fzp2aP7sJixeDTDqt/CkohySTnjlOQkaIk0q4ZNflG9

yGVoLrWh5bcJjyds4QJzMLOdEcynpuN+smfrtpcP60R30sd9ZA8opFyWUdaLY19dR5MYOSdaC+cGC9ML

30T/dyC/yNEPFmIEugYjqGXYdlJcnVAzgv9bjanPj0
w6LiwG4zJvqm1r6+1tjVHnW5zuPAIB1STMGNBdyGUbx3wU9RCQztUQkWalIMf0+ru7ZCVrEf6yB7VFoe

daKtpdXYH95VyQ+eBD6l7erZEfXyfsDSJlrIUdQU+2fL56Y/R4ynj2ELeLDjpTLDZF3cb2GdEBsECxOB

BAIgIz3dvAyzK8oXDlNp1izLFNV/2O6SMiXUy1WKJs
efWvq6XdSqfWal3pZRuJyZg+84tqAV1BMJlYOlqgavWrOEFSp7B1gRdWSOyTJGCdB4aSVF6iTloR4Eed

f3NnPfHVqcB7PH8dTDSM33j4jmJisODlmB/dFlbJYQK1Y6TystHQHUS7c/yNcdMslZ7FqC60VReHIt78

rcc7W7++UT0jM+HOdqzXAMBK+r+2H0DWn5bPlDUiY/
Nmrr7/z14PR5v/O7vFdm/+wJa982ZcRVof4r7hvkBpsZh+zChkksUEtomoCZhvc+XISmMgRx0YIa09vn

EQ6PPEv+J61UhDJlIWmItx9f5D82LYNymaWFj6ybX+oVURebW90DtZd3l/e2RiDxSo2Coe5kS9wstKPX

mHphiO30eaMcGZjCTUxL2AIgw8DO5U0mIQ9S8JkBLl
CH3v+1tdhx5SWTmyax12AcQ6OQ9Y4RQONndTLks2K4TjP/8Wc+65X4r6/WCWJsvK/aktGtumOUvv31l1

81I1p5D2PUhE5mGx92Fc64GtIFX1g+hCrEsODv1YFA3Mi0KQlcn7A1Am+dhkaMBTDb+5yLwjrnUNe383

51/6l45KNew4JQ8RvPOV/Jei4smaniKO98ImdoldH0
0+w6vhV2t+GF06nAYVTysCI8o6NWN8KHp82Erqn6b0k8GgZRSQ2aKoDJqq8cnU6OXhwrSBk9P3NBtvhe

kdC/uGgYWcgUJoPz5l2s5vQYgrcBzXDasePb92hvGDy8Ttl5cfXWh5dRTW4Z5kTsuIwicqOMj2L4xYUp

qzLEx042FAEgNMWhhvEYdsOoxmUYhqnTQk9qrzi/iA
OdEzFHTXSvpDrpXHM1S1TqDqlaRab7NuRDCEdfnatG+JpzTDBFRz/hzy8u3/5zvThH+bTlqmNJ91pNtH

N3EgBAM+lQ4c4rHg5LZiIKeUhy9Q48rvMQBrdAf/HjRwtzIGWgz7qzCup6nc1vztGN5gByzGvNE4g4RR

Mk7ah+ThkKxGYy6sd4GpmF6CO0t6RNty9VXH31UTXO
6ap/WlexyWpuHzjx+L0nSn7ZsYjMxs8gRSj3en4N/QuDRU4xhr2U4JA+k48fomxLjnBEvFEGU24lg+07

BA4iNNnqntHbafNwd87w1uwaBKm/Cu0AOpztL+8pJquNzIMi/jnQHdEbEaMj9lqNStZAhP5YAsDto52+

dKTDw80Fer5vPpVok1uus3kZrfsMJJIWl2YpWIZ5DE
PcSLSDt8pBi01i2HHi2ljaazMJjf8qip4SflAOWwUJ3GASdaRoDA+G2Fbfm7zve7YKC1lDMNwb2CA2H7

FAzodOrwX3W+BuqO+KNA708gFOK6UWODUpBvPfUkYgSjx1khl8JfRhoCsFYv+vg7jGUY0ZuKupQUw8Kf

mgK1secj7vJnzBQGTuAjNPsAAzWS0F2TYTIUJ2MCMv
l0oS7vImAKWv7CNm4yrIt4SQLXYXLzddSZOx9eVbnn4WOgrwWGijF7guR6gVqdsCIWKiT32vMgTdB6CZ

KmLXhokOPwK7Z5/hzC6EEXntSC+mbyxeL6fy4mZYPN4SWVGPWkoO+Y+EqaZVPRj//2uCsLK2RtY//0Q9

34B/JR/AP5/pCVk/8uK9+seWMJuB/HXetYuaThQ9zh
vasbMu3O2O8QLVASEVd9b4GoQvTzkUaW2G6eUSrEAzzMOGqzefa8eo5qP1nHB7dLnjw1sJcMmXaT4aY9

3Uwdq8Y61mOIGTgI5Yw13Il8Re2ARCoCmDarx63qI5IBfvQUAmDfbeLZfXUAGt3rBFF4wvRghXFkuLpI

b6bVu9kiITI45v8pv0lm9mLq+4ScJ0RregDZ4Norbf
ZZ504YDFW1nMcs9URXbryAISntJoSJszLRuBP0ZfOPjjQwmjr07bYJssdUaFAyVttpeR1Y9zMB45nbJS

/EwRWGCX9KCF4Ypod3HuV9qP9Uf/93F/FMXsklLFyi4qoG+2S+w/cdUcgYDJfeWpxS1Y99TnUSKH2/8j

kWR+POGYdYsMrZry/yk0zRXS5r2M9DboACiefvSIGQ
GeFs/dXqEyCTOwdvJK7p1xXl3fcm9fYpbSqCRNRh/JxauYAvBspIdW9eKfRFmhg1B5pc2flufwHUhBj9

KKjibfecSPQjnzA7co7vhhdrcn9RA88GaXJxm25ALPJYmAg0i3kX38GHiSzCM7lipU6yGLXWTKa8p+6z

cvGeZ8HOkAlWGFVeulhRaOipOkXNqccb5rYpdCsAAl
gGC24UnWEEMrpmFulbUe62ntTngbZr89KQvvaxOcUfu8PcDTtzNjNj5zSFioyAIX/P4CtukjLSwCfkAc

uWuGZWl4V7z2jfBaqcspgSm8Cwnp3KbitTn1Jy69RR0/X+dHeKWo1+55JHtsi1fNLz0Ux+Wa2eGlv0Zs

81K40TO0G6wh8zJGrt+pdOucYBbRXv1m9SS759odUI
k3c2Te451QQS74icRdX6RfUzyoWKguZcug7unr9q+WaRn6eSw4KfjEZlfhjeqOPhtvYZ2JopAbx+s3d5

e0O0uFtlgZOkkyoLHm6/PFLUw+cGY/mu2rDDofg7Aaa1f3PhcRELL/ITsYL8xaPRIeV3uNpcLku3sx2H

qGi+8NOyFxffTWJp1yXB5VgysOwEi2esTE5c+Xpfeb
DRzc5Zrh41GvYoXKj61TamRDv84EP5z+lRLGXfqtrCoFD257pOGTuEd3ONASnFX93dqEtGiWRdDuVV61

jyTrXmsUsJw6Fnx4XC1QcluSY8ALvhkiymf1VdBjcLh+Pcn3vv+jg3KgvOKYAlOUkaEHcK7bwiVMUvXz

XVd6gjqGyWS1iTfLq+3ptpemByIfoEHLzaBei960ty
VBuqFMhG6UFiQ1sYNEEoHNoHdiHKIpiP2Og1sZPx+o+fqt+/VgngLl1fRRENZnVvOBFysrY5K6TYVX2a

aRr8E4tI7+CPBIs9vh72FC427pWqU+0XFxRUkOJ5yvXT9vLm1oBsOIlo/UU2nI/mSvpvcd9/hkkK/UuY

7X1cXyWZN/3mUise5tmiNjNwykAI7jatqM8na/JOxx
gKi3vl8TQDXX1yvxXqPncAK+uDbpWeKRGU4hQudZTtqW1d1iYuUC40f3RIgtFT9v9snUWX5wQL82pdiP

vUBfzQYwbRMYzs46ePCq9drVe0FFIa9Uk5XHZf5FZFaI5RxGsPns0l6sbaigYy9UReIIZfyEfIDtQl5T

l1a3zQjC0lE16YLmuGtQGphmKXHtqtXj4G5Qt2ii/r
wzCM9VIghq5CZobDGffW9IPKo0InZr0dIO/1Ic4BZRWBsuGOSd0HDqghXheVTCNT4AWW3YrdNc+rtAVF

h2Fh2o9sdOTVFyUJRdk0jGvgqg24quGXk4XlbVLrzhpoRZ5HOyI9a6pLpyq2bbNaCDF/tIgKwq5qQNNp

WbhCq8Jrer5MOs2eLKxERAKx3hr8f5bF+bi1HKJdrW
RoeSog+vaOKJNRdSYdsFoFvtbcuufg9Fz+RgfD8IY6IGWHkADptWTwiOWaXAT18h3STMVzPXuzdq5qbl

Cw3wnQxkhiwvlrhkdS3tEq83G6rb4NKhgUGo3R+3rqwiFIWWj10S0Zpg7N4IzKJ9v+5rJHc3a62c7w6B

jsUsH6txKyt2rhK07ymIaV4fF6TXyvFtMcTHKH81KO
regk0UzXpWvQCldxx0k1sl0wJvFB8UPmd+LtaOw7bIaW16G2XEttsGyZ4/AKF5FI9UEjFzvaRM7eHskE

DXpZgdkZFUZHMVFJVjrnTpOG/qCBEjg/2o5jclBm/4WF4OfNjER/+qeKv06BoCQVUMrEPk6wctSVmHoB

LZ9KuMhj9CaNjZLbJcL2LmGHBJ3V2Hsb8sMY5WJTZF
SAPAhpdZ4ofqOKTc2VxqGMAxIYfG64VQ+Wjc4/ViRys2lFsCyTqAFzCnLNFUtx5DSikGHI60HGGrphSk

eIYALzNfEAIkfPLM6hPEc7UwbZrUT2HfuwPaxVdAro4DXjypZJg8Tohh/aNN325IGnlGRqGm0oL8rX3w

uvmKZ3Pv4lmvUaVKKTsOWqDyB/03fJmSivPCi5lJR4
aLfVkjwPODnvTAY4dvTvz/mvJqVeFW5j17W5BHnoXTyZrha+ta7GcbTD/Xo3RP7CgqZykohC5mdFWSCS

Zw/GpBZw3u7Ui3cQfcHxC/gjdwQ8PUQdsyuzy+0kXrrWl4Gj3LWgB7ZkO9lh4xz8k1zO0lJ8Jy35ep/o

fQdXJw51cpK9tbPmc5RnfRGxd/jCd7HCOVv6SRAYb/
gK+601P8P6EGna1BnrFkA0N2qhreh2ChJ2HCzLxeX8GP4nOY/S0gbdYlfdFxK/LSQmITVLKmxwQ4MhWH

rXeUbrp8XfP/2lPSdmWuBm66Fl9imjcGlGNDFJ98dlGaAbZj+7miY0DxVHlQ8x0itBN9yYGGnYytgP6F

Genesis/A5oa51CBTkvNoNrOru/6JIl2jRxo2TMufD5o3
S9tAp33cDFx0O/3Z8NPvqbSIWmsEG0N38lC7bvGlwk1ven7B9vr7wXi5QUlE/8e75g2QfKH/Q5wn/wxx

/v1I3yid8l+g/YvzIuf0zds5P4qSDRg/pdrOLnyrbhy39wBgTbCf8x3pYHMqDgXx/bNmPaKlgKfNV+JU

b6lmyVJcpwWJ0yDCe9TnZA86TAC77ErsRnHKCCSQh7
XW7PGgJgb+nMYtVqNTuuq7u6ArCCSrmdTciAyqiC1SIM/IvWu44+B2tFj1n9J+t1Dh4GhENZzhetOdqn

Z9stdiSs0CLggKU+mOgn/nNugsSSmaNyABuXMWnSt+ajJbLZ1xrIuILJuBCpEe/E2VcFMIDV9d59RDSC

OE746FCgkShaZTt6Ei4e4wn1pSV1Uue5vsPOIrKVSR
vwEmHsKFNF2+Kj3mi1916DcncwtEO9meh7DCtqXo2ni32EpOf2ezu21jqssqdRMHWzXFaQ6O+WCvwwu3

dv3em1ExbIvRrR9ZKlMxqa/WwRjfmozvFwHQveRUFGA/oEG+n9DHRoqeaXjxOUhuC0RYYZ0867B8h+od

glg62ogthYn4wIjCjoviQjH2xRhVU9AihQVaCYQqp+
vxBzVo1OlQIOJBF9CskGZbc/xC1ah80XE+hy09T6fvfbPIHsNYerraD8tXjG9RWgkQmm1FL/5/1L0FVF

jZ9d0eIIEbBQAMBWeyQjzwGVEt3O7IBk60CpCWCAte8A7Oa5CAjAUO6T4fsU7wbJNDA/f+7n3v/dd6/6

kHFk5jc6leii++zbWv4XUFqZGEW3FJuL3dxLekzcN4
J/elfha96kJlGudHyIBb1Eb1kyOW4oGGcnkK8cvyi/0C91/+pVS6A+1gRJw4fhp0GCb0rMKFLtKD4c5e

b5cHG7i4mM1XtWBRzmfDXEKlGTq6yxIaUBcRphJpofvB7WGhs6QFnyvxoetXugOPZrkf2hTVt+5kuS/m

2nJRmCFsDp2G++kmoQWb4PbTu2lxCnX9RrpmGL5Sit
4pDdzMotOqaCCVvKAeDIdN5xNGaSG9WGypTG9LUG909+Vq6/rsDHXnNflTQu2i6BXAi9mO79S8xxWDo7

I7Ja8VbaKrIXuH18rqg34Vq7OKOvngro1ZrHIOanCxymKHX5ezVxDAPyl9D5WFENCoq3/02nEOdBhDCs

8C9eY+indGtIzkL/qYutHsOUK+BLzgOo5yw4WVj8yN
F9At8ILehVhyXEm9n13NInXssiosyT8oMIiKKrLMZyUcxqKquo6Rf1B1WAk7h6NOn+FI44kIZBAwU+ZP

IoSZbCvfh9rlGN4MwIhDA1QCdGLaK+9wK4/rTGlPjYeOk5Acb0xiSNmz7B/WYJYdIXftOCwO4b8MlTwr

BgIagkIQL8cIyh8szF1DRnVKxs9HiydosAFckx5p09
4cgICwLq2hKofR19V7agB6lVoLeUWrkw1cWbkqGJrBYrv25YDsu6IKVSbi1EybmfwEztI0cjwgUvpLx5

KJ4ItbceFdv+PHUdNjReAi6rzPC6H0+zh45Jz9Y6YJEvVSRh6w5cD2eRZuqkXlAo2476CFs9jXIyVN3j

K1AsvCb9xa0RIWDJqYzd8gMJIYysHYP0kzbxPpf/5V
/16zknSmLs1JAIDVag8domenqqefu565CldWpybn7JfLFS6E1pI+g0diLSgYKA10euMgUD1B0vWlezTT

2tcB0clYKhc9If2yEAQOPzQv79r92WM1iTJjWKCMsC7kWk//we7TTywR/m9NELDWPd+HvOPASskwMct8

l57iqnjGj7ShAkKkgOF8SVwyQhCFj98msQAUCyUtgD
/QRXsEVtVm8zEmW1dIF6ghG0bq67q1I03xur6cGpgOtm6CyWsuHOujFAahTZU/5v5sRVrs73HGhWpmVD

cYSLi449KEynRF4THct01AnA0w0MAMJMF8Ix1ntFlFrHaqNxGmrzPwRt4cPKb+mAH0P1EQ3zCaATCjgq

DlueMWI8XcUEKJM4tAPJaaMBgm7c7Gnkbs3BZ7NDhK
3KEcdVcAQuc3/VXYlcanXLUc4sv3XdXJq8Q8Fr+2BTkasbREoAWR2NUzlnKSxwRcP1OeEGg3YzS8Deza

E4c7GB7VjD2v6VLNM6/O1gID3+rcXGgkAvhjxzRhUcP8qzUuhC2t/VDtPmhCNu7UdZTqp64TALJtXzka

aLvNtK8clHIG3C4bhhTj3JHHNxJTXUql/Te1XIVSRk
iA8bXVmuQcNBqFYk35T/QXQAxbW04iSAGHxgVtKz3JJ48QCqn2l02L6UC2wDlpAV8hIuOkGHMXnqw8rg

96D5dVVxgRaryS4N0RK6qzJboQj8sh8CkIDSI9g+8/o9j2NVVPA23xe7MaiegqQDcYolq+fpad1hnqYQ

GIBA4F2sllA8xaPjNDdnQRL7gkPG5wqaXN9zLmNrO7
oa/5anNswR46SFvP3J78VfC5dbMQKwEzUW+w3vFx6gkH3T+9E0Wrnj4S8ivgW7ulbJnwW8ZHyELr1ZTk

[id number]KU+hjNNkeLOICReq10HvruvahaMdEdva4bjD+7DQfXkvy/yr0pc0

awiOAf3c4Q31emK9WIdlzbAKRM4EptsghrIv2S/hAd
QkNMbUmPf+f1Y+yAPg38YVJxn2JHpe8W7HEQoMB4zEkdfPNPaOf/camUUrNU6fLAltI/o8eS9lBylsam

XS1wPun/pWi0LY4YjzFkl7dPEUMoLyxyC4/+2HtVYy0fyFlCFo2BH8B79rL+iVYAK2PJtVZMXx3bapTY

rWSi/dsf101J1j1Gcb+lxgTuSStdL/Mfbv2Nx+9oLH
qoW95RL3kyBDupdec1DlL/0MTh7WXNL2aEi62ctRFbTD/o1UW+ge3z8u9bLh7KkJfBlcz7pUSMZZkdcM

ZMgYmpXxFWI8zCiZqLwcKGFKCqF8INvvpN4bdJNUqh7+DN2adKPvcbnDDoCiulPMz1NalnX34hsEHlcC

UVZwTY44UBRcVvwTfa3KpUtPfHN+PzyOp51USaID/L
Jlg2kFrhJMrPmNvHF9JsuSo1Yv9j1aw/8v23pIULrk6pDbkbJkk8v97G1xmKtnF1D7u2prgQZzefGfWA

3govqosHPqNLFgmGuUO1cvnQtvg6gPfHBfSbxQRegAuHyRBMhhauD7s0kO/9RVBHjDQoXrMJU6hpM/Yh

K5ua4Mpd146j8krJU/kPTxR5JDxJ/BlfUniw/J3A1J
/LdDEFCedklyCBaXRHmLWdwXbnV++zQ2iNQN0UPv9eUgZiQpbVNexcok2k+O16OdNO0/6TYGfpqZeLco

858BmYJTkJsg9S5eLDEkRKpirbZxOjhiI/BYb7hPPfX2iHbcq0F9J50fPBGWcjRCGwNJjoqnTj9ufG9q

ZHdY/B3KM2w8bv/hgw2uRE973Sc5CE2mvxSjVepyYX
OIsQC2oTNk2t5FhbfsZYbt565rdMXmIbqbn515OKRLZb8BVbkCLsfWjIkyPsi1yFB4aFJE7tYjatuw6Z

ev2t4kDu6yXIpWIAg+fhMY5mOaV/MtGN/xy0Q1u/EMoKJuol/maoTbI+EgnOiU4K+w+ddJrmXB0tHubT

tiO0B8nadxWje/5XADTs9fNJYqRvGGXFOU+7HQAttP
qO+r5eI7qzBO5D/tVb0/4mY4DGW7CCG9Hq5ncubrxSKZ5f1RAFu3TlKI3HwK3yMt/Lrb6ZZkUuJO36p9

DFFDHR63+qvDgw/Y/REkMvkcuhfELKTECxawErqfxzmL4pIEDI8XT8qRyZ1uVqEAsGeSvl3G9gbMwmDo

Qu5qoAPPnK6r8cbpS/dtvPGmqtxBhzlkDuQTt3DWv9
/4xw97ACSTm/YHH1z/9B/fePC3yWeQWOekIgwTBUFKdkstVXrZWCMm9aYsgD9sBNd0G+/Pf14G5t+Tkk

2tWDxK6lGOKMZsuoKGmItechXrh0PwbvCTCPSegA7zH/vM7/RPZpY+APJlpOOtMRTa3lVzS23dZyVH9u

Ed3wTLu68UCCbOAw0sK0RPgltzYT9k7I6ixMsKv+/A
EMa+y9QaGbN3lPBWMbJ9n/g0vT/8QcdUSwlJy2CXdnsauCrzu2h9JPM+2zJMg5/ckz8Opr0NOGUsLD6i

+oba2U0SPNdGYMHAV7IL8jEQjA+n73HMb7y2Kja1iSD3Zr/nSdYD4wyn1JCi3GldD/ZNPSnmQ99pezni

8jsANb9Jf91Az8TVescYR4tc3jz3m+ym/xKkB4mWK5
bnECryr9RcC+xw2FLV17Qsg+K2kDbdmfC/Hra47Z+dGci2fGE/lZ3HdB4gNp3LVDP60m3Fd9Lu14ANIn

vWMI23OQvSjo+2eYMx5JjyWBj8WUB+o58iBl/3VRkSJ9Oi8auepj8m5DTWpkYc8ojuYIvJNZWXKooqlM

CTbvynSC4t7/d2ja1RFQeTHr8hK+N64sg2NYifVn+E
W1VyRStCp6WHMp1U9+Y/LYYc0LjeRgtNifauFDf6VKKZUDfXMsmiuL2cKiuQgDAWtNf9htXrbODLA+9P

obm2tKDysRagVZS9oKMCpYjZiRj8nDDN1UU7W/Sx8iiqYk32C6yfdODIPjazCUsjj+FdDlN28mpwixXm

UCmQXji3SZhmZROaIOupqpaGnZKanrXdR4znNhnO3u
k54wFGtHv8swcsWLYQfHbXOochh/Hr/sWm8LPsmvcYeX5UqRlyJB97VJgFuePwzRxW4nVpRobhoDjeWq

sZm8FW5r8kRP1BCcLFyq/Yp8hbFi/8W1AWK+SrjwdH8V2wI7GOcI7bwZzxJDACJ5JKPjmHZ+glHlju7s

mSeAPdTolWJzei3GtsMTjnD4N/wcaMLfgPBR7eA/zC
LzpeV0AlOeEMIRwOwODSLjKK1MibQp5KzVFSsvQt7WtmuM8Y/TwoJZX5fniMjuh+Ht3MUtXavJV9V8dz

FAZLhl6/2aQ+KjFq6hSUGCy53CNMKNdQJLwQQFnrirUj53S7+k4L5BIiFWQty+2aqgB6axNfZNg6q+WV

JdErO+MkQi5Q51TZ60Y168CWbcVFxCQ5lURbEpnwiB
hG1u9/39ddvn4zdGpT5DLCKXfbctXPjJb1SQAE3zntJ0UG7xx/JU+XRO7izfrCHQY3wVTa/FKcVMKSOY

yXX827gP2u4etoqNdI2NqiSWwpyTRi5ioyJrkvfM0cSA7c/Qo654VDaiI0zfMQDXo9yu7BWMkUpjUdcY

s/t5gzI4SoWWDhEIIZfooUM8eXo4lqUnqhdrD/JEHw
k8wmdPCcb8M1daU7JHo+OmMehxRO563QR4/oYJWUwrgafcN8TSawhzsz0g2pmp+8rKC7ZXGdYQTsxOO+

gMAZIjlhpXH2+Tg58Y5vk5LhUgy3cJEW7k9yuUmwJODOYZ90T5Vo2jPR8DOs8WjSpQK0ADCqTnSsyf5p

AwD0DJ0Y4KDFgltXLOzRoyuwKu0mh6mSCDlJwsp4SY
I+32zUid8GzeTKHF3OAU5UrpTuonJiy3IpXD99megP+pEgSLoHuf2CdPPW4n94DuispBcivauIIbrPk+

m229tpPzrbkFeHZEOiynUtTc/9oRzaqZMR9Rk1+OlHv4opdyy9PHXHD568l5Yxxv7i/iVK9mkPwwxGAr

op5qpIG2YXpws5BfQE9ylCZmhX19Bpp6gpolISQzWO
RMLoNrJeCuFfwO/O+SQiR4brGshDbJVBxe3yDqORpQv6YPpbadYRNRsFqQYwjkr7uN1p+CVVcDcZ5WJ7

kFdtOcsEfMdvb7Hk7bxjUsTWjPkc+/n+QAjz5crrgrUBH+ZJEIbM8E5m7oieUwubLSku4ieZkHYYWYPg

S2qygH6RmOOk93ySVD8W8bJnCDdFFlu1v6ImKWw1ND
kT6N5coCbywPkJGtRSIqCK6smmgJGX/zKeRz6enLRnNvxK4ySFll/uozxoni6XUaAQRMdCywrY2Z05mq

uVxm8/P5DHQhaVjjKIEecHrFy3QlTQyh/3r1NpulgUQoJ8OnHA8k8G5X+mKQYysSsoFLo59kBXbbKB2o

WaUH20FgwfUD+9fW6S2ZjbyURpXX8NO2O6rV1v+IUC
ZyTBw+XAOrPnu6T+2XVp1uKPl2y9wOubYtlCXhK0Bekp6/DIr0U+9JDOL0RUUfDQ+CN8HWGPWuGnpS0T

WXtuu+TIXfxAu0lJFLqX6SkbVl6fCMd45aS1rZ/tftBSq1hBJzeG2sr2oSLb7ljamA8eUJmyjSuZAotG

VOWS9k9BK6HSP082bJFJw8HMiObOPx2mFEREwH9pfh
Ji7nTVPdhsVs1nYTyELyItDRDMjTFB3jnlouwADa6mlYhlcJicmM2cWCNWvAK0aCysqDbZ4F2Kg0UhxF

IgbWQN7Of7iEL23HiEoCFzowrSWwWYnS8wu4hgas1AbPOoIQMJYL6sC7Nmn25bFHblm/tHlLTFLNJW82

FUX7uSMv5X4ZmgCEMxMxhIrA5nqD7/3zBAjxAboF2b
wVftp9ea8aA3JeN7LMFKjNHpovMyRmdR1E7FRQsBOPmazINc9/XE6e2QbaIOtbDTkFq96/oXA0dm/MeS

MJRDNbU7N/2KSFyGg+TmCXC1z0DpjD8DY04aMpvb5XfPGngYeI/g+NMhtBKOoy9vyttGyGamsCCxZIRU

I+O4mxTvO3gYVQTPlr78HBWTqy8i9mw5E+nitror0X
ng+2bpJcovENjRd0szoFOT0WP+wVlWdpeqxRrnkVjpPDEj+tHcLFf39DHF3Hg+fqtt4S07EpUSv1xYjv

Ft3rB2ZscsesebwMK8fWBxXmhS/ksANs3crP9Yu/EbjrsWE0OoAxi00bFWjlxs3CdZlYqbz6N1g6Y21A

adhoTS6YFaBG60bshrQrkhm/SrX3my9SnAgFbFjfGb
FxS6dNwkgco+eysFmvZ9xLVOCKA6EaTnKvNcoN5zwuzCUivANjYviiIlIglZlNldvD5cpmG0+8ruuZ0l

BH7ADSlVjlEW9i8wFiejg+LL5rYnwcKWNlExlCkzqmJ0vE5PYH0PuqpKZsGylEIpIjO+zBWlBj9TYZm/

nwQiAVY403uOGez4vwS6JKMXucFuCWqXjuymmq7tll
+s4WADqjZObRlOs+sDOioUSVr2LxtLEPqcGrbhj932DsGeSpanSeM0pTM0utaWRZ/W93i7X9hKnHBd8m

hnCrHZ4PZjBW/dSPl0WWZeGzCipiuEL/RpRE9nvQ8+Vq+vGaKMcm9+Ruxd0tBKa2XglXbTXRAKRlktwC

pPwsrFNOOZwUtk+UPj4TETqZjb93UddnMFJD155ImK
RFcB6sF5Mram9piASXlbGcxYpF6Ca2fJjyP7TfhuVkm1Ypt2x95Ul8s4UOGNvxToW7vUNWoLJ9Zj9LWz

QvqyTxMMEiD/Zd1mQ+kI9UmIR6IhwxhatXKXgUhz561iMZPIZWRxJvIc9zz48dNxA0CyGKQqWjeby9Ro

n0dG++NI24fdJSIBEcbpAk60muDry9zm0aqrUVttQS
JdwYazWNH96vn9aPF1HxBcz5ZeAQCpR+Uk775+cKwpKqp6Sb0dD67kzCqEvCH8jw+q2pTDy8VjRBK7LO

qT2yfV2+YUX3SrkKqe8WQMTKiNlF8/Ka4/qVL1ViaqJHOETkbZIpdKDAqlNZtN2/LeIo+qg6irQ0gUaq

BYqwVbzLQGGsK6lYLGz/r8yNt3LlJ8bBXtIGuEeSwe
l6VLO/WWa7nY8LKUoar58XeSZcLmVSmDo7WCPU67mL5lVkzL7pHlHz7D3crvdY+sDUYOhJWwroUUH2y1

QU3jDKxeOmpbivlOki3UUDoVxCtKdSITSptTLKniewrTkQ2XK//KtB011TlVQ07zPnDvEQ9vdXZqIvMo

m6vU/bPmwUQGd72xzgLCX5LVHdolM/hIT+UD/mvYaD
2YFaJQQRo2pZZOLZQdzkguXQPztXPIgnFMJUIxywpn0GKigo0WnlInHaj5Fhws/Ch9BTF4B7IBk88Pyh

CZ0+WG2mFVfY7/6d4OROzArKowxdjVVqXH4YS+yHNp6aOtuwo3WXZBA8nvjO3SbsN0Gq1PsvrcYGdiS3

C1jiZHeujuxqXIWiwKzys6bTfu3/bWwM0SIfu+hhCU
QMnF3jmyd/Ra7oj1W30JCG4UljVxTgRdLkMOHHcXT4f00zKOwMBslRHHEE/afpx95AWjnVZwsnk/lL6/

3m715Wr7JWlCKzajkP22zLFxqsqumpCxA93ekwlKD9JVObEMn2OMCPgPOmXLKqaSWMjbqXPw1I/XXXul

94HIptsJ3JY0rixwsGeGPYHE/Dep9ATHLgpjhOSZc5
1+uw6xlUIPhagmu5KCeuRATu/laERqqNTG00B5fJ+P40t6qUi1Tl1xpXTxhU5TBZjAlOAzMzt8Q7vFuc

i+eFo+mTc/mWtdA5FKR51SpX0GfESVytYpmJWoBBVMWyOiuas2WXzru6OmIE53zj2JDe9cztkyeGodQx

A+6i9O9ccD8HYiXCI2Zj5EnMy4IZ+VYYXVlO749N/+
6Qm5McysayB3+4X8gtEcoN5ckykqBQFLXJnSsHm3hu42YqdffocfqGPgEeAhmVWpkZTfjpFdG78J3Q0U

0nvmudGqLrdLwWdEa0B7vqc6sIOO1jATVDs97ZT0Iu/0fLPp4nUrCk8aURhmv6+73peioiDjhZytN9b7

KvT64VcDeOYJ3Z87gFD0Ikp4PWd80b89PVVLrj4eeX
bHLsO7I1lV/9mLPJRXFkA+GL7IaNaO/QEF7onBzUscQ2Gwr+WQws3DA9Co+BGdbV0g96hWWBN0Y9+Lrf

n+rXwxqhgjFp6wOj1ykcuTWB3r4x7QzeIbueZXAf6NOy9tQd9RrWSFTeYehoBFfX4JU4FlCdXvF0ns09

YQ530JgAfXddyd4myj9ZgsdM8sSNj6ttr7XBkh+hZU
J74Zyp31OKua5CCRrW1QwvNbKAdx8rHyKG9fj/KsSxdW5VFiMClOrtW4VwyeIusT4fQS1ZRkhMnkPKpf

0VjVGtxk6p4v1pfiuraavOTbtI3GXQ3fNfLOSeY0pBRSnOqjClKON+Fga4aTPJ3IxDTmJehHij/TpNsH

o70wlgSOd5UaSjb/Qbg2dUqIwGI4HDDSXPPiQ90IGr
FmwPcOwMxKYuJZkJGEKMOud0MBCZtLRvU8kLKqAB/SP9rNG3mVVVo2sMVgl6TZtOIXZTgE7bb/MsIKRC

X3DjFUaf8tjxUQkgEKAaMFPMdo3in8vuaV4aDLqv77emnQGieghAIYcccn/ZNCge4uzlK3UOLJRYsV2o

wwmot1uusUYSNUNLwrcOV6HhVcXwsb4TiwA+ifVQnh
L4uatY3Nq2R/eKmBJRjdxtswUBtk/8UtJ143tIR06Vy3g910jtgy+13UTO1Xtq+bSsSy4yJfh1OQAoSG

vis74dtIFQdmk8TsNiNTJmYOolBWgn9NRLKRs9cZHf0aWsxpZxtJFTj4OgA1jBEfD0vBw5gzsErefcmR

DhduawtWfWV+2y/V06Eovg1wkdBrcWvnMFYs3mF+a7
xm0Y+uZDExhD0ar7YfVmxDygRaXPF+b9Z4d0+udMp4vEJfBEl+v01XAJZ6omugTTiXoFfMfQbwYJkZWd

JJHx/bZPxgCBo3xMUbvcfbG9n85jc/lmbkK+b6rVp+pp27xPiZ40fwHkqVWtDn+mptXagNfmkR4zyD0a

T0zAqi4Ut5hFJ0mMIGvgyPlmzhmJCl+yBMW10ESiT9
e1g6G4Co9fFR78OH8bZC+AOGj9Akx2Vl7zcdLChfvEkrfR+3SdijFXx42xQnUzofKQInBWkwR7EyQgXl

jNcVuTZtLg0+MVi6L7vwOb4oPTkMdzN/XUKb+l1xCQ8pjI38p2HG7mIl586DrokydIW0HhpMi/FKnad2

HO6ibJrfYE7jOIp5ap3Uzpk3gnM3/JBoSLSBQeBzwa
nCI8Wx9UoR23QnIj2VXbI++OjLCY6tlhv59aXNOa4pbKw8NxJpc4DrBQlc7HDM5qCLO4UZaIy5mj9kFs

PD9Fr6jX5ajANucFR9kyA2AOJK6Y5MxUvSXe09ushZq6gS4CqOsdgvDhkdjnWhtOAaSj9e9ESZWdpV42

2/gpmNl4y1vsn0Kg0LIQ/uI/ECi2EXK+G5eNVamBco
Rh+aiaSPizsDlw5yqH3rrItBJ7z3VyOYd2f6RIKvXsXMa2UmFVVQp2EUHjQROFze8CS4dT9NmU/GBYcV

rlS9fEF8XRQKYTIYxFOpHXtbLiuwzX4uKRJkHF5dURavRogvpHOx6LAJrhVaM3Gb8YlJWimujATcDKMA

PNCn1Ng9jf/cwYTzpAXW09e/x5r//RVkd7WjNfYRnA
Q9cTDV58nW56XYzQwixccRWQ9UckrFuXsa7W60qUct4VbgI9nhG+SSMvEvMsN/N8b9f951o4Kp1j+Z80

/r4f+RUyZK2e62cyXMCT61540+DtA+xaeV1cnoVr/ObZg0zRARebrLE2ic9eH+wP8pDKFx6I0uVThuoF

U++bzouI9vIi+s8RL/D9IBSQaGrFQ5e0vT/v2at1J+
1d6jhKkZ6XHb6yrp6DuEnoc5IzVNNBCvc6C7H8lxVYQ4+w/M8x/ISVciTTNT5jbH2agFK8mnKjPv3/Bonds

EjIQOE6+WXv3yEpLFm0WlpMnPLeqqHMomWJ4n77ee+DIqMBkSm26bLack9jTWv5ei5v/BxTMwPR8r1ik

l32/AcT+plR9j5X1j5C/BtT/CrcgM076lLcUt+cGgJ
hrtyWQZf4mmVuxapT4i386ljLJBeyJxoJoEWAXT9azM9hvaHmWVvF++VFDftNyt88Gwim/l0J/leZC3L

G5psfOk3BTDPCCXVG1WVyt5zWvm11LxlCluemXBIA4w59aQyxdtfdp6ENBu3Zi2Z6Nf+Ah7Rcs/FciAR

RdgYm7/T/3lW7MBY7kuXRi/GmcfxK+zZWSgyHz2T7f
MQDLXdxSkUBPXFWemqVkqsO0F0bRi7J/H70lpd9d/L+Og2NKk5UHV5tlWgulQzm9Mv9Haj29811297+F

/8OSrX52Asf/nHiv2R2Bk/3hsfw011SHLpIcE4Dv7Qc3bDJe00Q+0FuCw+T7Aa+hTtHLoalvjY0uyzjC

jAITPeLyRgp5PyZbR/9nKtcbBj1xMfL/QQr+35b+F1
P9S2iBX2rr8yoTlqppg4AShy0OwwXHl93oWd45C/xTMf5OVWW/Lw06ev0w3/Lm908q/bDQo8jF58aEZl

Ha4o6GcEhFkdYg/wuh9Mri/O3rsb6bcdYjMp8lBtz1vx031QyOPZG1FmgTHHr//HGVkeVald2KN2V/2A

DiVqHDf5QrY+4yVzqZjo6IooohZ02LEx17mJjz9GaQ
T4R0jw8KtxMQcCSnLaBFzO6Z6OQwHw5GI5nGnP0FCi/Sthy0D/IiPA8eNRBhjpAL28/C0FRMms0GEqie

dNul7owxlszNV1G0DEMOt2arcs1lhqr3k1vEXYz8Fzha57MCikviEyAX1ZH/TjJg4eUSm3jtqMh6K1JF

7Sh/pQu4yrr1Cc2f7YpDfk3c2WBt24L5IcS36CYhxv
h3m5EfdL1xmtnLaluZ5EL4RlSQI5NGULweYzoZJ3GBWEmltHAvJNEiu0jIyKvd09VBhk7tT9LRo05Bse

SJr/FuWT9nor426D7D0gnUC3aeQwwtobuipHGk87JXECjicaEdfafNKMKw4DopG8ZhSjUmBIQKhTayci

Czd6tbyIzSqvVWtT1MRIX5D9SSEg287cO73KDf5k+P
1fiIw0BGyxqyThodUPoyBRDPW5Q5JRhgWUB99vbOl3psc/OHU6D1xx8h/UST28/6WxhB2EaVX3KRQ42r

SmFGQQaMokLFOjOr0kJGG1ONw0eLE5Gq9pemie/9l4V2ScwOffCjKcOKo2kYv/UEcU4riBGbez4379C0

XcocoRXAnu8zsYxX9ixPIRqJvqVNcOKJXIPnyiZS7z
rFQEL3QhU1o/SaT+CBTu5QuMObebamLKQp3ojVjqCXqNUt9ZohAnHyKfwzEtKRh2qamAnlgg0ynnwaST

m5FTWgZBrYwKA2/OCCtu9mWqCoKnv2Nbxx7tmetcws5J5rcZeKE65CDTdfbZeNekjW0XayODLhV7EIdA

U18vfSIvhrg5xvv4btDw+aSvesGclF9MHes4a/hMb2
Ge3tKdydNUxj8jDXv1OfJ/o47G1/W6IDIPAIfHi2uAiUN6/+EThyDT7HUYmfqQUdM+YdIA5I+olLowvN

qrE4ESDb8o3wUiSdaVdBvAQNQBrUa/XbO7/Vk5iwk1GViWgB/63zzvX3hg3x21EfHFKWAfyItH4OHlxS

XaS2uv2RzKIp7oDqSXwDd9R04Qpd4RWH90DiQD9fNJ
8UcpYjHnhQ/qEU/x1umAzCp3S1GNA7RsFL0IYS+/t+aG4RI3NM9AzdTbl+NPQ7isU4hsnt+XbqXNjUlL

d/kzjDqdvSbgD7nPHLH9KcVo668zSmbhokg5Jvx8T1K5XnYGLcDUGE9MguFFJdZNgk2qLjdfZIFfI8QK

M1JMU7RVuMNDq5mwYhw+He1JOqzU0IL+9p0yxg91YG
JowQE3/FJTYfnkSx+rrOJCkV1qMQiXu0OaC62xX8lREnmJwXTn2XYQdjj6Do5RADgj67eiPfpHCVqVng

mwz5cwG177BhkdsrtVsC/SS7w1/vlBIITeeFIqLAR0NvhuuWLygjikiCIBLK3ElwQmP3YMHIitoJaauP

j0nZacJ1MIYgHwvNOTNHcgNkcGZ1EFPlJaGNXj0uGV
hmeYUAFuY4sNCu3bMiwJOdbxlm1uTtydPgVZdcDlhV2NiK+EQxRxVriaVr83QwNpW70jEOhuRL7GYcu+

8cIoxGHV8NbCdeWr085/fIsupXyoKN3jiAB4WAsnSeG2Tjz+MR5Vogp/MItFQuEUichk9fz19fVm1C+V

zv4hTEJTLOOkoeke6P7jOUCseTRSR0hhbTG17gsQpy
jygtSKfNpFvmoHMxAGG4ED5YZvRhFg3V9+ZLK23Qbs6OXw+9sBCJOY2Bt76zqfeukdVHsqBN8OD/8yXL

C/+q4yYrkYl1FKmirzkIm3d8hoZlbuJtqKMFVzWI6eKM0ne4m5f93sDoNGu6NbaPE/4IarX8cYOYjjSD

D/5ZovOQlC3AHph1GdP0Syr3aE+ARzvgH7jsgurBK/
vHV3U+wsZqr6pHE0es3LmmPtSSjyhH21MmuTUeMSsnbtzW3w4gIo5ux1cE5fCB7x0x/0yQUcsT6WORrn

h/K7Lb1myZZCIk4T3f/WkxLXHRoXpUtIyd6QswjWl8GIORTCOKjkgWxvDFkkuaTqR9sVdZ0UMUhQXoEW

Q71jAlshG3n8BoM5vUT8b92h6FlplYgCnsxHy2N+00
ARgTrM3PD1AO0537ol3kHLd6VK0VJ0FXdOgknqfP5u0vadLXSfYxwCXr/OPlYdIBcJPG+OELpqxFmlSk

vbWheN1v5VuYMGtcAJ8lj6DwiaaC51rdZXL4IxkQinFGrvKHC3xPOe0OOt07k96t8t+wYBYtyiYSqJW5

JWYDJNfSnaSsFBJ6ZkiqZYJpgPcgizYIdainqOucUi
49aVKNLpNKmPGDnhYzZFg2pqYcr9s9TRZYo7MqqiMR8CYc4+27bKbO7Gf2zPIm2qIKSowKGQ3yM5BKSM

amMXsrm8c09/HGcCNidxAGiHvigyvU52f3JGM6lHaj7s4S+DsNK/c1rnLOXKmEWpy0gjf0W2K3PmmrKM

fM81lufbv5/NSBaVILtVTyVoWdFX/hDe1jTlmsJqmr
k2caJdW7bVWymXsKxRj+Of+Bf5K4ukHx8wk+fB+D1Y0zQG52KAI/KzE1m7vCfMhc/ugJBhTzl8GhozR1

uf9Fi0BLL57NU/TA7GgdLbJ6SVGkeN7ICQYIZ81f/uZa6uGMD+a6wwMiiMFJzlKcg7/S6Y1gckHxYqeG

Ur5F554FMQBhYsmqTUCTi0a22nuep0Ykk6lwX5DgMa
cklRZdBp/8/nw2c5/Dxlfv92CDHNfPO890yBiXKr48yZxai59dh2vosuRnk9vKG88JX5lCRXHZw9JvbQ

+x0DC4t9akjDhs7utPdkS/qIsey/7gEL4Da58NCjbhIGVzPS7/f5b+jHzsnBl4cbUyS9ucjJcGjY+M3K

8NgWdwMBj7RyRbhxdYRQZ0gAsOEz/TsU0lDbjn6lMl
VzLAIXEU/nf3hTO6bOQ3cfFYzPRsoCO4wO6Pj+pP1zuEiUpK4PhEityMR0A7bhYej2NX25I4I7wENRpT

qEINQcuN2MHkcLkHKmADAzLyiKqFW3vv7OkZCBZHu8KCpYUq8sUlX0pI9PK4u+IAWNftyrv4qHM/SF98

vvB6v0Y9dd0EO5984hiVCnduD2ZRYJRyRNgFlsHXOt
KNPxuYb8m3HO+AaT/ePlo2o32XglFRT4hGCLJImpJIrnlB6Ih9do0AFJd02KPnXCyvEw5+QYm6E9GTsY

n1vHlo+66XiDOuaQ3/oX/LR0CExaLAvpmyaabyvPLGdNINQO/Jhyf6S4STt4Il+pbOx7NpZqn3NvPIWQ

3FEo7fIfUTPmqAqAug863ZGHJBL2uIHcubOZ6D7kzX
a+vPn73kd6h1B1KKFqxtSvBroFX3+K3p8Z5NY2tSyp7aeXBGAsE1sWjUirYx40lfXuun2V4xQHAkWlvj

w8sMFQtBJv8FOzc1tuCipnlo5x/jViemQHbjOlYozEmCHKp3j7NnJqkDAzh0oRd+UbMTNWNL8uJwD7Jr

0xLPNhA9t9Q/I5/SLHowL+PGgWcxwQi+T7aYEEfWXw
J9ZxUFg/9K2edjTzMpCs9hDBtdOgD/2LxY7h765Vy5n23dF4OEK+t40/AP6hV/lAzpbxyIg6cDaTDwxX

HHRb5nB1k0wyW2+YFw6M3fyEAR3Ber9a5j04ERCJw3Vzw/GkPUgKjNd87IIJ/iXT6KxpeIP8uv+NCwwB

JWGv3iCSBe2vUU0MuOqv4SaYstd6ShxsGRy3x/OLEP
U2goSRSS50LQUvp9QYVJvhOMI3HbS89DBDj+GoHrJ5SbFbAreAbMRTpxK8TjQbwtHzaxPhOSxJUU+Ypr

fHo+dUJYokq8sba8rFbIPxrL32gbKfGKry5V/lGP9ukmaHujMxG4LI1upM0dHpxDWGn6EOOpyOmSr2zj

zXgcl3d24eLNN94osZZMejJqtYpzkWewkRrkZ4hyUY
NSanqppWT3BXX9+zkNW7CcTvVD9k1b1mjDPx8EyJFijRS2xsv6JBUak+ixiQNArqkpxl+sRPWmxJuXdZ

k0wFlt9CQlc0wx2Hyh1bKKB4WVUyXNFNL/9gbfT60pntLnc+2RNN5oMsh6Tx94imEYtFPL+pBglKC2RC

qiiRq+oQhKYwdfnjBdxoTGpHzgPUgtVjuBotBYl+Xb
NbNl+GII59tL2bLww4rXvxtYpjhNbVZVXhhf4ZLO3PONnLFIav2dqcV4QiGl3bE5exyOHvKP8qIyaGWZ

tZhS72iU1y9kke81raT+VfKPA+i51vMpUbl44EwfthRvyrzW9F2HQh08cVVtK50/ZDEqIEMJeErZL4JC

g7ZJ205lakTXDT1YKGue8tRLRG6ML4q7Of++hBghJk
DJnNoKIVhKl0AINX6bCSAn2d/ZAym87w7/Rc40IgUoerVdtZrZl1AG73u/uOtemojJga0Vbff+JoB/c+

IdteK9Y3f8PoJOIp4lkI9Q8rK3Cu13xFpnnabrNzkNcFLwjUz+/VRxFP33iLLa/kucHOzrp+G8XViiPF

9HZUyyZi6gKu+Mmw4yhrmE5/sdVhRI0Dafz7j6oqUi
Im1KYJGVVq4mgh+w6gBqCb/cO3T1q9/umPDLg2qOgKl/acTIKg2srSpbQqLGx36ekLo+qJ7gzRjCursJ

Rwrv9GEdLlBkqydztRivlID9SNGLwyIWDupNLMkIuj5/MGf0qFu+ovsDb7kH6QVPLxO13U7WjuDduWrF

ycvirkUe4xNwN1C88Pal0tKXg9t3JaOt1kKhSvMR0x
3UZZgjS1Aw1ObNjtX9z0QsCp0h4T+mDvmOgQpP3T0eduX37+ZY9Uwwfgqyk0BRFkCpMCqcV8cwfQAJf8

BGwWOt3pHkxQnH9fx+enA87a5DfHtocP8OzZb9ASZoXzRfu6kfv6MJzqt1N47Hx7bVAhSs5pNODFPppt

NRf/0Hk9cUJ0ULXb6u8I96R5zLFq5FYzPGvHdJK+pG
bfPKyC8s0uEVUAUBhwN46c7E6y6Ze2yQfVxPnenNrsKSlecA0OnO8lDZOKLoODN6yqhxHYE4MHAaGiK5

RZU+KeTG+5jwGJkavs7WJuvB4yYlzKAtBKiIpjzvcc1i7PCop+TM050bm7vxQvX8VHTOExQEvevzHpSr

EmZ+6/7Hn0TF16Vz5gmprFgDvVbQvRj6T6gWw0tJPI
oxzd4HSGoepAmrB3ZXPlpgeud/TzbFLQnUHegGRrsByqh9wU/v5SpAaPbxsiFLYnrCrODpbRG9/HGnrN

KNJQ9W5yDR4z0ttcz4K2qVGLy8mx7jKpxwcMiobxazrbaa+9p2CB7ewlaTBTxmMZSIFHoOCdtNRZiSwP

DNz39tV59wEX1dy9E08mK//fEIxMhm7L/nQYFD3mJS
o1wfW6ipDrGzrIGccLR+XCoVtEAWYqLUrvm0xUe3c8kjEoyi9p63c+V1eV2wmhgzAnYBigjs1E8yEID1

iB7YdGSG6RylvqNpQp7xypJdQE7rCrxkc3hxHzHOEPmcJWpJydv0JyBCriLUJZ+dsxy56QrzWa5aCD1/

AK+JUR/0KRimMmbHH5IcJkwhghgYFvykZfJjMoQVJr
oo6qL+7STJxgVvAfoPN0DAP0z3z4mruBksDVUDCe5+p4xl/4mF7VUjyOKWOwWSg6lOPvb96RFxB/IwKT

ywu97iH2VMH9kdXCoJKgv70t3BnN1Sk5CdpXxDEfYkSfJZA4pbH0LWYG2/zwGH3bvnOgJlbWwWZ8k4qE

xmaN91ClpwDFLPewLkdXMJauM/IPRpWgHdc+YSTI1A
Y7Pw9ygwg3vHBKJ/Ou/S608EIWJvwnJZJcq1gsDbefd/ntThZe1CqwmCBuRDV3QOG1wyaTJG7kNoXO5z

sEMx0eSahZcrPBP4pls7MFjCBDGpdT1A/FJVIYnmvh9zkjbxyZit5fP+snNoHgasXrqVw7LZGvrLWfhv

t3wOHrkK5uMC34A+Zypnihh+l/rQeUoZGfT0aW6+we
/Oqh6GflgqFQt85+c4KVL6TMi2VI7hpi9s248m1ylYxPKpnVDK/tyNVrnnT+2HJi9DvQ3XG+4Xffwqfw

RlYpn6pyMcuPluNmVMxF5LH+TfqSlXdADpOWqrIqff5yKWAa9XxhUksyT4KwAgnfpKkYbFB5zZ7Nn4AW

Ej4x2Lc0rUpyQ1kHhhIBc+5gb3H78/N1O1IOpWW66B
kDJ5vgCvz4qFBQ0qMR8/QFgU2+nD0SlQRZzl+pYWtYmA3Hf0C7aE6x4z7WgFZOm0lQQRg9WHzCN9J4g2

oUNUAka8PPLSTfxkTEUaoqi0orKoo1bMh8Ung5TRT/0s4pS73zADg8x8lvSwGgaSxrQMg93oKS/w+j8n

u4xqfjWfRwcCrUdA5To2+ayT6X8FVdWyJrCGaEeqLZ
OXjmr8OGppPzqJyJxXB4z1xg0itXuVcWJYPlrlUxSH8u9K3e2ibWqn3YYXC3thprGAPR4jWxbJ+eA1Ds

s12BL6rmzM1A3Ry32i9zMAN1Yn9lEODKCy6SPrMiIZNaUXdbY5CrkF7EhL5IfiU35tyqX/Wnidg0acoE

6qx4/BfCZ0xfehtjQx/xfNMU1Zc8iyI9RrybyGh62p
nXvClX6dqkozXpG040xtIpJIBCYMkZxSIxVpn6HRjv5aW1MAEOqoOr+ECiKLgJApdH56Hy6W77kIIiFp

YOB6sey1yW2euotp8Ozzu6gf+S0pTLyA0+TXeUCg5AficCvCxv7tRNyV9xmvLdoK4kK1c34xXgOc3Cc+

RVxqOALXWMXooZyyfm5f4XOprb14lcEt/up6DDskc6
++Bijy7tCVrW3QBmmu3wapu6Mxddw3jwDaWJlf4DPYsW+6dvw3qxaBbPsBLRYXfLUa9lx08eLEJvgkhy

IhGNdEeIu97IbnubYl7mwpADdxXvM+wnh0V40Y2j9FvSSNPjwOLfELUhQl/y5x2HHhAHMDirvld9jWX9

Fr7cvdme8KyQHqcpzC6YJoyHwivVDQdEOaWDhzpyTw
RQ9UcsfJDWpRdV3k4giwvv5Kyb3jx8qRXuM1LHo31uXK7Ee8NXK+JMy3pv9mEd25FcNAlv4LZlC2a3vf

/MfRY1XWT7h8n98hJ38p1BHTGhwwvaonj27XzaU1grutMkFZtzbb9Hu/mI1GicJiJ/dYLBwwgzvQRoFw

V/R+ql5GOSiCYJiL8nNNOZLQ/MNifge7+Ek5J9TSD3
HFqH07qU/WNfNvqmwomeK32quUkdBz1MSz9LPy2WngXlkj8RNW1l5mEFLxOuE4Aw9OjtoY1SO9enJpWZ

SL2u/ZQ3i16d8seBzl8D33ZmImzFN0yQqqXW4WSDNpPDcFIc4dN7IvC04KGbeDlIuFbBIBTpu79qcYrM

QhuGlA9gr3jEZNMHH9QQ/zf8HPEyyD+kJo1q2snpip
SnV7L3cpwDudha1zIzfn6NbNJgwOgTIRYWwuzZDvYR7yoaRJa0b6Xh7KUYCAXNA3rSo5msEGU7O8LYgh

WwvpPcS5SsWotPLQDphb9C/8IjI0+ADB+7uJomydD4KAoHzuCRlS9SxpOlH0DQCWviC4Hp+S8gndCpIF

4+w8VhWR8zG865tgO7ERk0cHdD3j+LbMPbgQdXVDQS
xdEdPlrYwQNwIGnk3LfhluXM9cLvbZkIo80hNrM2jDTenH/ocWVKy/ms09Mze03zicUVF0avscAIY3ap

HHjIKimTd78bjotIIzRJyZAjDoC+9c1ulgyh417Ws3TOV/9HRam+u8YX9vhKygzSLicL/JAZqn6yoJ4S

qEVh6lbHtzh+WXvEpr/3QVBjILCsSelxsvCRWhgQPz
1xIdye+XjHAcH8qYNFNtWZ/nDHxUKKK0l6Ns5Uxz5AWq6qwS2uP1LfXYpy/Rx6fO2UZlnAyNjgA1kR+T

HT5oF1eGHswZi9EzR5kjd7J2IqewtTBZa39SfX2On+oIWQWG0+i8PoabqNmseU062xebPFPvz+qtqkXG

8wV/bcre0pqoRbjO+LZRfLal+yLZ4Xob/tIHBLB/Sm
2Dwt6URQ4dDL39tp/krGFHRfR926sH1C5mvId4qs+/SoMXewPdR2zmOsNzhkWhWtm0ZuZarTNGIOnyqL

u6DqLkULklfpwOTjySQ9ku3w687nwNIEfwrVArjJLdnyV1d+zSzxIpTfD4QygFiH/bhS1HE3g7PGdOiQ

I5dENEmTXwwXWgGPqsCs7t2gBmw+4DRGWOo6lBfWRZ
y1g1DNllw5iyZMkglGfmY8zAnJ9fRdAq5IoYZg9jHguCWFF3YTDAMIIUwao9cytc1f8a5zP0fZz+6Em5

GVf8+Nrvz4gQeNPbhubGdh7cwlAVjBadUqoOAxjrtCm6VjOD+Dcrv9S3qICLWQ9FZSlHeeAW3UFNoecf

xe4i4AnKTvMjUeEKUamAcI48yA4oiE82zmHIxHDQ/n
yTOR5kXm5MoaUEta2QpnJ602nc9F/WVIqP29U3JOCjEzvf3t4H8CIA002qRfwt2kfX19+AmLs7Znea9Y

WLWsNeJx7AregSwaO+Eksqthg3c69J+acVrATDJyvw9HfTVdyNmxyZyRC+OZBajXnQj/IdPpzHrtB+mb

Y43F332GyOZfzPzcj9aFxC39rYujVaFjP7wAyXE739
kw7mgTf+CTX8XsqxFmhA0luAcdGa9Mgykc9AWfPyyxo1/TcD2QkBd4ML2ZGEC9NG0NZde+/cxtdSFMsN

pXHHxvhPeRKadMRAOy4DZ/uo5j0nm1/u+4+5uhPN9a5NeeVAow3aT63bo+2eSSEKPcEUs0XghQdClQBW

MMUdRY1fTPwyDNAn+uBevJBtjY5BvdYsOMfhHtUGID
O9jSZ/9Vnhi5Tw44jcxTl7yJpca6gwx2YgoEyC4OwIV+JOlsEB5qugfOI94JXWu0G+ofsGPcwYtEoG9E

VeVjC3xcGk+RoZm7Tokihd2xzINn1LevwJfMgKODmjINLjat5L6CFOIFNglEUyocQL0N+N/uTolBLOZi

Ca09PlCGToGbvuInwzC2V6GtroFmJ8h3JIiwnUVRWa
JdQ9yVbdfYhN7apknTmoTD4RkF8VjbTfrIyluPYSzIKNmtjpygdX38y3BqRSkwjfw+HgTPVfSRBxLN6m

zOyZQ2WGjOU9MUMEIgs2jCdK9zCZSNOHxQ9Uo2BB6OtpglBpu+mo+1gludoksZ+F1fkr1uQGOm7nUZD6

YqHFs7IsaFgauigscLY1BDaiyiszMiWwwCRoyLU0J9
s5E8KZ4KLrs23P15WLUXJ3lGgH2aXklNTVyF6k3rPgv4Xud6Dusi/WYinTHIftvSWDFMRsb69UqgKz+m

GEWhapI8T2pFPoJxdpzVDyG8kvJYoKHAqGsAryb1MfU+asLaei5zCVTA+T8epIiaHU4+t/vOfP/14Bio

ckFPB6fJ0lU9syFBxLrjSVBez/7TscDLmMWKXsOy3G
hNUCAeBF/fobQKFv/WuAMXIJlDNwX1e75USYOS2xMham+RvGKQ9DR8as0HCAqIkMUp9PydxzAXE9wTdZ

G+mhLHeLAiADyQkIclWgTcTY5OSxufob3boigiTykfxT61SeMYuYKA1UfFJSuqeuGdpBErc7yqSDBJfa

AqjoBhTtj9eSoUR66WmKdN2R3FjOigHopV8hBXs6Gq
8S052HPIGdIknTxOUSyWXwNigczD6tcAMDJT0zXka+bwnQixK0krFCUHktAKK+qM4nVI/f6FgMtb2gJi

KPXPBuHgR6gJW9R3n1xErWbAKiGqSYVPQfE4rqGoqeV7QgERqbPGush2zJmQDpWePNq8mkhoQV/kuB9a

V8nodcn96AWihuIQs9A1Fp/xfZXTYTTrnpdLaNwEco
FCkh7kTJR91hpGjMo5ug4X5gOarHG5r5GM+Icf9bmzQFYRYWkLnoko0hGfVBzWDYHLcUWG/dCSt2zfpV

qo9z72Z6tVQfT012yZmKFL2aKL37yZiNJMWjIe0aG9JwOWMDARNQ1jgZUeCMu2F/xiHH9nKtMNQfzbrD

dTGSpoVHkh5sRhDn9DWFtSC/yoqK7Si7NCta1bWwyS
hFUMGvjYFMYyIhhOlQZPF9+TsZB9uAMfblnCe6KLs4MWwf7FJZNSJlx4E1BwaqcyZoXyZibCahoCjMCN

KDb8dVPAEvMqS7k/XkrrwVe+5pQ4z8T86AWI9lJ94hkga/w0R3TrAiYhn7eUo1E8UNZ0OPMCiNS0rryR

LGys28WvnDWVoN1zvlLvCfdTFUau2HXIFw3ftLyaHi
5PF/tkN9Lhr336ESvhcVL1YyTu6hQpk1wF7aTUz6g/nmGDHyuCCKKQvK2uRbqcyynJ8ogZKYWZNPoTsn

labpKsddxWJNRtTLZrEMpFrM7GCqqYNeS41aRDtvkGjRLBu32aMWZ88acY86TD4zTzoxQ7xS3naLGYQN

zw6jFOvueRQZDH29O5Bh65xZaNRgAP5i6yAKrhk7+B
CWyt5pwyshMTQ92wVFRyMympw4UGa+y4gFnJzukOlkMIYIJihOcZLpnyLEDuRr4A0lGkAbjKFIX2hwdx

JuSvA9O3JeWfBAtLpKkeZBtOjvrNcsQDcfI5eSUfwxPQ7AMOt4zO7jyf9g6DFv35Eku7G0qDFw5Ollah

kX3JA655gxwoCc8NixxFviGsSuUVXf7BfdpqVDRajd
FPAoOXnJ6k/2wE5h8x+sVrlKZXOdHHSdnaHhHXzu+D2wvhmpsq6OW6qXUXz029iy0dSLe6bU16Rkqqqt

qljJiVCiIrfSPcvFs+ziAs3OJf1hGIS7WLiKiHWVxZbpy5M8Tz1Ebdz1iKLubncRvokuo55miP2KrmZQ

Xemjkzxz77b5qXbOFm0K768eBi7cKWyJpRUGs2tttF
O819zlvfzBbi31pKAZpyt3+skwxX11WWZxk0FAPgYrt6UNFI4jnuAde6hNy6aDtxFmd6lMk+U6c5Fyag

wAIBcRz4Iuhz6QCekOTEox7BRUYUR/YTggMD+g6wDhnaSkeX34YAHx7BkglxNvD0te27RGnEY2X46B+7

z6qGAngaOtDgzEEEYfd1R3dSKXLphs/+HMJ9cP66ZK
2Qv8R5+rrktf35jk6QicLV6BN/IpaUQfwEoeq9AZVb4CBWg6CrwOqGi0sO1tKu7ztUmJsJirnSXhhZ8g

onVf8ny9Pf2P71Uf7utc9WpD0GLmV+jphOfRntYGj5JuVYkshlvBL6q+n6bVdMVU00QFql8d8jR98rJt

7UUqYAgaAA0ddT50AAuClB2B2ePUCO/EBgidXk+k4Y
C3PzFyCItEQz/rRubQ5iPecSivZ9idC09EYKlyQOwz/wSkI79kK78q4PI8LASIczqrE5Po7i5WOy6R8n

mNkOBSzniBqTIkfX21zodMZV+PISqUI9gC0pLEVD/50NfOrFWsnSgK/no6DSQaStDJXoLzVEy9Fs+Ic2

hxjjODIYod+i4hsj5m80OQpZ+bKAmLuY/7xyZoWzq7
sTh2nitkwchIZ/MMx/HU9tF5TNvl8OoXSUqaZxLOVt8D93vdCOGN+f96qns61kn66YZyfB9NSWF9hRC1

OOqvmrFSJWvx0RZUleXrMLxDSK6t6LYZZnSdcfwcKx03e5du6TeBvUujwSQ9okl9ipVMKGV8oibkRKz8

w8hn4i2NzcQcbcaw3n+x1iSwMBcZ5dHzhWCyOrsDoX
AB9KSqzbu4hTLlVbyyUhBlNo2PuKXA7cg3iAzDvpj9oQS1FxX1LT6M+riD1qa69nhNgBY5/qtnBB/hl8

tmjSm+dKGhc2vZnqX8YqpYCLwGi6ysTwK276iNALh3HmBjD/p+ZEDVGajHWK3tIK2oIzfOR/JniWqRBM

cZyBqA1sFgQSZNTe8aEnvOleAwD3LL9BPfx07cYQr5
mGMFwutkmXkcKiks4IJkHWRuIVrdL6Y/nP3Ibu22XfN8NBI+UfPZLrHBSwP5U+hwyrWZ+AwKrObiy+ms

1EljCymfFmwdw4qlwJ0rNpgsZd/vyOB5fJUstfpG3uUQzBxaeZlbzfpv5KnkAp81VWc19fyB966Mif+y

qROw2XdlIZLMiEz10ntGfelQ3+7fsgnLqLC2kQKQS5
g8rljb9LCm4vG4spAmKN9BWLSwQ8N5826dujzjMmwn5g0Co1uXBXhcBmwxHySP0+CCk9wWWoo2OdRoI/

WuM3doyPoZ9o5s/dQ+lzwkJlaG1LrGcPzxGY0Mu+Y+ozy6Voo8an6uGbSWd1s88/TnSmE55FAbKAgH7l

1gs9ionLzhDphOCwiMNy1UFGDj3sDoBPiG8svkZVDT
tRYtLJLyHReN+wJa6If2xk0qZ+S4vIOic/FtISA1G2wJYh45HYwzE5hURYMSIdALCOy2JPK90Y0zOH4U

EDfWCfO7EpQwtTUsFpvgOExI+keT0TfjFMnvQRAPK6/XedXBopwwfCwwUAeU2qFpuvG3AgCPvAfUVjUr

TOqaYBOcNWZ2DyLejGGnWwcPBWnjYPlKv8uNSzw32T
c3DJ3NxonMoldNMuJGReit1ueltPBtYTlE4psadiq7TWGZZjGUA5Pv8RkSFXYAVfJdyoWJpIRrIRZuZJ

LAnizPU/hkEiLjZdQVpUScwIvtQZSIOly5Ar/oQPeemCxidv8R42hUOrZy543fQPQnRrhHexG1eFdS3l

44bOuXiKHCYAcEwJMUFUXcLKJxrOqC3fH0EQrOFa8X
Ua/vPt/e2/n542u0qgjX+ETScFXW7L+FGcf7sPuHX70Utwzh2IutRB/nYbpNVwSMHSuFewNB7HLvE0a+

p9rAv3pP/7t41P7WHvKQC0BRHMoL3tSIfNzG2h3pxWfmIMJFxtpMuVnyyC5nRVDVXtp+4iMosXS/zNna

zUAUgv3JYltcKBPlgaS29Gky388HDfc4l5AvgMRkcj
BKihS8WpzKlBJKF8T1oz/CZqGOrGYHi7WgHkQ9OI1OQs7kPw56d3bYcgJ/agpaHh+7/0FX2tzjrz+jPx

7T/FyLspAsRCrK3Nb/h0TZd5AuDtvMb9AE8x0MoRe86RPk0Ysmf1U4kVAcM99bKMs67jkHurkMHh2r+T

tC3FM+FxJomv4IE+HDg5AaEMPhkS9w0H8bAo8GQIs1
bXvMSCYe8rtJ4x10wwV85YLByfugp9TDxr8CHGighlJnyzcGvm5rOUmFsEXE/vXa/huPmZNKCtVYaw+6

2PsxwZkCZ8NGQRvod6lBau4d2wLVU9AOp8H0EoPZtQp+xhjyikfIuxA57E9r4ghEELswMNVV6/GQBuf1

Ko5FjwrLHo+3WeROl7bLnZ0DcS7A/5FATZq9iPUXcM
S/meng/uBew8Jg0LhU9xJAkrMF7GNfI5RL5J+sMeNzjVzRqXyxeaRQq4RKochY137ypg0HwjWHXfe4tAM5

opILY61cBW4jBescDGNXE7IEg4e/3CVvOUlpco3IJxVrvl9HCjKcEzvNZQc/EhxFgNxu49Nhtjzn5lyj

taNLSoy8Jw+8wwMITUN8/0tsyJU5ako4Hj4rW838Kv
Q2BjhjFPQo/90tJXpYeHt6mAdMPihjsOGaytOe2WGXf5JtsGhn3nfbsA+4li8KpfPeldtx6kspR0okTf

A1+dc493br93uHTjSPScjZE8oGZJhIIU2FGSeqwKaJcysSqUlvClavxQrCNC/HkeUh4MhezQj0KicUdf

wWSdAg0pwwd+XMN/xEkZxAe3u1nBeV1mit6PSC3Z59
Dfk+WYX/z88KI2qlf3iV02aBoIVuDqU+FwZMBQFzxC6iGcopl4nu5Ffh2/PNu3BclTGg+9T/y8CzDd4q

tQPfgLkBv+3kX3xuE8/tNG8bR6/2n2pdj3oEz35X/w3EUnv4WDgO6u9Pw0BFvWGLdKjslMR39LIoRW2c

VUkYnF4l8/HLsyJZp9njnukwdesZPxewXUmFGu9x2j
s1JvqsOY0KGFdLrIPXXfWYmNTSKkChsa9Jv4MUeuyi/kUY4EfZgLpMZxMvkJLw2rERKtGZZbTswMDm94

Gwqhr/HY8eI/RQKvF8BY+juLOgDjBuGdeMCxeHuY5vgVL0K1VCRPpmq4nOldjkvoYJlAnAW0uSMXx1T0

6ThuuJhoJlf8uCdB9Qw1m6MTO8rJA+h5LYvgJBMU1T
AtOhAU+HlxpEcsLEjHVYfPw2ATnfcUwP8qiiLurrWZU+pVEUdEailejfr1KyE6Xg+zC58JMZQKk6ksZS

4/YsG681PJhbOHbochbHYVynGLTMJXO4HleG8fTGtT7ankX52hsl287MOtffOXiJCP3rEYKvlrzX/Zm1

Uoj3yufhrd3Esc96xYsPUYpOKqAlKEOUDqpAxEzkAC
2mdfsYCQN1fwooVk8+3m3UQkG8U5XTlVMp2h5/OtR83ghAgGvJ+/s7ySjt9xhLtqafJ4yIea1t7h5T5i

nyORhO2eoX79yRR4VVoimL6TmL15gBUz56RJktZ5Vpoib4wccZcg9n4JYZncsWA7U/LXE20fyHwTSK5v

vwlFK/gFMZAGgx8Cd0TzPe2AKHCGFOQBwUuhePuOir
qbuYqhpKsqvjbw6Orn+081Vrr+Us51/mOwwfk2582ndAkI/1ZIQ7ppi4KvTlKFH1WfzkYU2ap+WnA+HA

lQ8RmQvjLLFovcUcG2T96Y4jpsRIzf5WBd+nI1ktt5jbIUAhMh3uRJDdbgXJn/oNYZcorpo+0MIU/74z

A6k3FGAb4J2VnSiaEwbf0oLXe1P8HzjhX9ZD5OsWE1
5ju+tjGr8qKLDVyJLLQpUz4lAAqu3nohUUhVY+vbA0O/O6bbLyaWZj69cn4hoXwW/IVzAoWpa2pGJyVO

WmmU76tGDuPms88n+ul5/MzqwbXSg8kxBOedkfeLkf5cWx2gDP5kRoXsaLE8o3rT0cgQ3lkcmC2Mo82Z

vF06susOOgsr/YIRo8cU7J6x4hVYrVLMDlGYXOxiZc
zbEodzR2BudGBX0QOahlDzWj9VRoS/PXnDG+ik7JnFki8BRfKw1whwHY3n0mNveVwTXK8hq7e4JE31EA

8O50/EkR0KZ3fA5wPDstmvE5KrrVQzkCvUIsj5Idy95NrSF2rlZZ8O2TkNnF0leLDRonPmwV+UDRUcZV

wYFdJsAWeNskkEhZ7fg95d6ydP62XdfLEFureI6PtV
vAiRS2k5qajsawKnPxm7vKDvE93j3Mpt3l1SRVjEIl+EoA5AGj7IOHLXYDENv0g0+N79H1lduJ6pY+Zf

1F3Bpyf9abAm5W1f6Ndc9Vt3AiwhxxiwWQgH61yrqWkEcxsSJIXqhbjBathjkrREZRjgfRCAzwzquoc8

pfZkHmzlvCUZSSO/g3Hal/HuOPPvKTLVY3o2X5vxBm
VuhGpKk5znWhJm+RQGuEwJurvQnm7q0La1Y1xAYSEJHWjyVH6s0QwJLliGdVpuFTa92UNaHNjhPV+3fY

rVQAJ/fJSrWSfO4VM/0oA5DWPTbn/lpmqneJcqZ7jl+u8A1hSb8c9/bSGBfSu/FQnphHcC16YL4NU71P

xGl3y82yo/p2XUCrI1oUTT+eEeTGZXVVK9aU1lAe6w
N+Q9b9q9KrPgJRBaESJGgt2zfO36/4kI4K9MATp+zZv44UHk/lwV8bwxG9u3knZAlG/EU1ezWoiKhsZv

/p0vH+LJI/m3MKwwd9PwQaHtkiRQIhY1I2JEEV1P3p+fq89yy4xGWl39Z6m/7F9sn45lcx4N3zwz3tzk

O7NkTM5Q4jYCukj9J3VzpHTcGdBEa7WDsHqXqj045F
F3pUGj+mZxs+Jvb25ZTjYdkSaS9LfmN7rdpvTQIwJENp/fvEuAl23nOpzuK3Nhb0OYVqupS6E/a1iNJn

I3CvXcaKs3+i5IDfGs8Isf6727HG8WqfM45Q+Q9hj6PFWpCjdjRVmZA0ZXyu8fBRF9FU21vGJ1kXCUbi

TNx4lyz92RNCYVXIJAdCJeVqWelnfIhDxc/bxPVEvB
cZl3gVDF+OEcD4hK8FfAcKYx+FXienJxVgOEtmVf0TEhZHnDMwMdVlgwjhljcsQrAeP2MEO2iYpNL6Ja

mwFvS9c8ZHrURT028TLvW7zaIFW6iJ9AC4web5B0dU1weHh7pfFicAfh3BJJb0YOx4B5IPbX4ECuGIdi

UutMZQKd7ALRd3TPMz7d/v5gufqNxloXLD7UwfGBci
BIcPuUVU6MR56AS1r2xPYEjas6J3CFjdArvZkXJKnkDPA5AEUZjDgm5SkwilJ05ARxOp+kOFlnfYRUR5

p2E09wG3hQKZshtbMTd/135a/UfyrpP/5LAn3tlqzisEkT1I/JUFAzU3gGdVd4rotsq/3unDLe6S+vov

lrcgSsqNAa9bQaN7yDGx+4wNzx/fDI3p0455uTTpyE
jQaWB1AJycnS56dUnxxZKtUZiPqrktEa/JhxNpZvRrlMH/zQe94GgKKswqlBTUelsKq59V1kdcialcR4

uXGZNypvRv9fAHaZg5c/EYxA+7XpWOjpqxLFm0G8I/DbBkB5ygvW7aIlcv4LDtEHDeis8YRN2N8JL/IS

Oz7xkEBquogTN5kk0gzXwjzJq7GGiO/3fc3LJbVmlc
HLD3L2fb5fFrC3uMWKqRk5c+cFfwWBA1BeM7xyAwuNzWupMxEb+JTMEYt9dm5HpiqgdCmDMngIknV1GH

mzMofVDuiaOkDHfG8/tdADhtSn3UfivkaaWti32E/6rluKRVl5PLMuCQ0WPdmQa/pG1YUox68dbOQLx3

odarLk922xDCIQycuc2MP1LTqjDy0FBWbYARhxwX7U
VX0K9dzgXbAhQBDowaBHtlGl8aDTdYeoLH5eMv+bogp82yJ7ceOymYBvC82Je9pZ/VkX/1HZkmOh9IRH

55GeGMsFxKXiUWxP2wXd8toc1RSHopAx1DMziJj7Pk1g1DA6H/desUQ+fx3SXZRVcATfs6GLUkHxSCbs

S6IM/LepcEs9bB+OjLivl2h8m/oUZ+bYJ4/8XrmQ8l
XAfCOVdCCy6zh6MjaOm8K63U5hpMawITRCTcFtSJblGlC8QM5T0kch2GI4gUyE+eu4iFlfbGMJQzub+Q

/w6P441+to+YlSwJ9yjmW5mVT42b7BwkdDUHZx7t0Rll7v1TrB8maR/xSo9t80GNg4Rnu0C/sF3fkZUF

P28+vfg867Q+Jnn6E/tW+8+h9LBzfkze1UvjRBgtct
qXbicn+KtqLVdk3RHbSYHWMIjRYUViSguxMacjRSmRM8h+ngb7j6xlAmNi/nJQob+Tc5yt3aFlrueFNg

3Jd9bvqeuyoI3Q0Nfr63o9/4iBQrOpcTT9CrtSibYgY1HHk8vCHN7FqyU6Q6gUbqq9OWq618nxKTub6v

84TIAJxhHmBLjqLBVyx5uRRmNT/cQWvsXRsOQ6NUkg
DsWm0dpxZBmRKEQRc/wQdX2V2qvF0dJuLaRqoedO5kjZ/20hgXFwQtydv0iRUnUWm9L0Gqaw2GN/Th3N

6PR6HhYP1mRcDwE77mx2v3sEI3QCIhnlir5wUFmPMrTMLob1GJDIGT3yNK9xBV29cXNq9wu8VWsgXKIb

aa026iaGTtN8LfP5BoFzNWjtUOROjlwQtpfMC58HwL
jecosRTdLRNS4C2DfbVQ2+3xdhUGrjpkllaawvz3HyUnnNxDrt88US6fZswpucRcsxbLwc1cvxOOSUpP

cqTwRF0VMBpQJYCSgvCp1YUekE7SFdJCBxnxqYMroCIIpy9ndKMjTty7oN73oo4PFcgPGauEHlnViYR6

UIOddGT5n0UUrfVFJzVgLTHUxyWx5KnlkVScQ6AJHq
37vrGVNFpV6G3JrkuhjBVRRue+funuzZUr913nuX4V3f7pmScb7L/mZrXqGR3XNTlu5dZ/ytj1oeUynv

rafweeyh//rEoMv/6kaQXLHBxAqhS7xyWtg8JWb03iEUF9qerezQWOQfj9/hd9MYsDvAsl1n32OcTLlR

TJnalzAJ8wm0Co1VxtbBNbVev81kfwRF6XfHyYhAst
1z2WnKUQB4zWq/2AxHZiViEvvYGT/W2XxhFuILiXjfo48pGYYQW0nXk+KqgoQrcSB7X5tTJlaFbw5thw

W7HRJiH2qnIGedjJaWFlQYasBBKR8GYmsjFwsUWCec0XNOICAu4ijnKQs2dga5jv1MSmhLBuL8tDVLMX

6YEF1uZ935rDv11UK/IP+6NNO6JnvHYu8mGFGznaav
CSyjztmry93Rz2YnOCF3j3v8JawlA5UbABV3qTB2NN63wAnBnbTZ8PpXeJHlRYxxqfPSrIAirdvkgQJG

NXg255AmEMD/YtMlLvGUKXKplgiVhflSrayVZPmfSnyp/GWUjZ7dIUGvtJuV/Tz9tg0iy/2BLgq7sAW4

DNlXBwSTBCB3pF7M6bxYghavoIWcO4u5JzzTDCa7w9
WJjb4wKpwc1kWk24n3M9GXCQHXkIuK19bfDVvcX0RufRU28Z3xuSJ8z4MvPzvUPk/sHZNgDV57KpLwkO

Dz15FeZjqwwCPki6veAP1J5MJy4fvRxT8FOYpkBiXsdCK9w3brP6ZfWUAn+9deBiiYTjUZsf2jtb5m8q

0suMo+1Ol5EIYyHzejKPmdG4XHI8DeJ3hF9qo7GBpw
taDcNy5dSld3tpqoX5HatdAIPn7ftBpp8U29Kuv0scS+Til8p79bd8AV7z/mKLG3xYV7K3FwyXiPyNqW

XMTw5ZWm6Ug1L+eAZyh61DIWQKUmpuD5uBoP127cpJ8o6CslsgsynnYSU33EVgCSvBY/H4wNk3h9r1wp

Zo9U/2a8S2pahwhsfRzFp/JHD3iL/qHN/+wkSHv2yY
L1bTozViLpwm3mrLCX6k3iAmiZ1kR/ByRNbPOTEg6kQbNd4McQZmgRCfn0H1kad+UPt+e46Wtztg/AzC

6fr1vHj2dNgUtzK2+8A+fVG+D98/Xj6Lm+3EDq1dBteNyg5W8ypBIOblFNcBtZwYMvUJhaedSeYNDTol

pW/Wxupucyuw2TJehPd9WsCaWRHqLTmhK7WuhFZmqI
Hpo1SoZEW+rcSlFRP3I8G9iZpomFyRHw9krF2vhmpkRtKFYyuHNJhjWxez370xPeV5LiEPfW5YHiGMAp

1UvOFfckXy5ZWTXVbIqHW4iZW4fBDjjzjWewY0ac3hnVh+avh6FBjS6KlOG25iCGU27b6N6nmVrmVoq3

riWkZeDDVLoZsBltLtXjGkw9qhx8MIrRrZiWXN4zqL
nHzYI0FCooSGvylgZdwqHlModXHN012EAM/2fHRT1fSQZliXhoMC9riA7AfxqO0XWCUCqrgSgBH+x3DK

VranvQd/2QyWqL46dbTI106fNY1OY6QrCdC5M9SYm6vKYEhBTPXRTbT4FxKRrQPn/OUhJ612AFdU8Ob1

A5BKMlnfSmdSoyqhY/gVK6VhcHgemP3ehBm25jlltu
yxHnBiYu/0YzlVTMj5Z8WdrmA3Hoql5KT57OMs7++FxLT7M8vjghg/WpG9d7xLQSKFm42iqn/OB52Dg2

23/psbCDObYd4FNE/SnErPLyxSxeXlFHie2HcvPJ2kUhncT7+gmOxdNtx7An4zeR14+YXCrkLhpriJwn

vjfMu5LaeWxdx+ZHmcZoUt02n4QBwHi0m1eGQmWXKy
N6JfyYIWQuuQEGQfM1uzNj124MGg1wIkNz+xPzE9Vy6oUS/a6NwPFxJCuHg401L728hB3t5zZtdFbBco

qJ55LwOn53Qbk5ZZafvg5ckyATLwLJAanfoPbIASQV8MwrKHEEhBH5Vg7nQWgpJgtO7QVv4gGXPTAhLo

B4XiHf53J5ocP79a18UvMBdp7XB8sxOBwkoEmrw8Pb
sMujNvFGTQdNLthxWKFRIoDrqT781mmULUg/Rpc9in9Z1nQwZiy4orOZVnjiPM9AKiAzWUAQ6/ojHoF4

Qp0CyJa5YvQC1zM8km62y+eDCZmNDccKu8wphImXg0n2K0wdsEYVStXLdUl8KFS0ReB3a0IP/IftkAL/

CYGjeJDfJYIjSSXyVVSqn9O/sVX2JBpycxiq0CGvTl
cpfpHAwCu90Hn4GDILwkOmOJ8vgA9IcsPDeHO/QOgvUMr9iBc1h2w0AaIek80dGtJH76lWCmz1Y3mxBJ

6DuUSiXXqOtSLCBwhJCQ3GpA2U1gvoxQCsvJGsx796y9HBFfXspAyFcso4SPEHeZd57NiWqo8uXjHIvA

62C2pvTV3bkuDTyTl4ciCvg8s0+Gqctlf5CN8Inpby
syVUiAKLhYrr4UcVbR00pRw7lvmDvkhNq8//fQRfkcn3Igc6/f/815eSgxSspfGPm/c9zX/Gt9D4HRzQ

zb2JsOLYv1to/c4kMia1NUDXRqclXzmJzqT2+z8hNiN8jklUdg9BUlYSzCZLkftOWoQLXryIHNmy/Q95

oQlyT4ce2DJ5DLHQFJryea6HTRtzaBSnj15Dbd+qeQ
+VK4x63Fn02hA1Uj6pgRMXixmkmLpcOzckioQaR6+tXvdoww+MuGPEEPnxTiVMgS8E5ZmNj8t7RY9rGD

ipm4iaTxpJ/9MCidYweUQsEDBB91CovzcqUZ5RVuA3vo9XCPvqhueKQa4LtK63YB+TMZMocUrWOxxH97

YznwZQByKlIIeLc7ge6BVuBVbTArmFEkyAaxuTrUbK
K6doH2EMUjruRGar2FUafHQpye7vIsugV1gN/d2o3Xhgm3mWkdl89DHTzpkeL5HZMLK8rorz1TrP7QIt

qnbk5xc3QjMMDw34xkEEXb77lSS56kXU9PTfMplFyKhq0F2jPVkl2Jpfupir0lUcXn7sOF4eHITetykg

UzGIvoulIErYiCRjoOhIpPUDau1rKMI+IIckjLuNoE
vh0SQ6MsSm/ynXhvvQjf1SjUP8jcZ9mKqpPYZ3Dgri2qrqBVt3Oash3iy+/wX4oSSM29maNf4oPbQsri

LyltfgsKd2AZDz/bWppylx7YSorbRjZPM2UH+jtxvG26XfC6rxSYFZIroyLLZ5TsEtxUOBa9yBNr3VmX

j797br8nMwN8sVomyes82yvJ9o4AO+XCaSyfOpMEbr
3MA4V/r6Bcohpasg8g52ne7dQr4BdPGfieooi4S4o9x1bn/720reA1JFojiE+YC2NMBH+fnOWG/22vJ5

E6DbRAyNw3K9Fm6om0NeWkBl+HrzMkt/n6KR+323YdIbi9+oW7deDMGqFHv1vpOfh39Dl9HMeZGWwcub

DsZak2iLliz9LFRcqrPPLlEX4U8J7yM3g/wpBZEQ4N
4M1wHFj5XHMQFJqJlCSLQVmqIiUOCnmrWlYkbPbmuRuQpYD9bgh8ij+l7ng3xEtdL0s3pZr5covXQYez

NCq2eSAhbmI5Zdh26F8v5ZEUurtW1clmkJPPyipvz2QN1FDKtS6l8LlOP+IDuvmyOPuYGCBnTtv2Fr0y

MFzY7Q9tw4yzk46DMN9IfoNPN475eSDxA8jQNVZAa2
KX0akchlOx3NHN0lKvqcnxfydELQKm8+3eSudUL9CfyyG7UreYP1VUO+VA6hWJN6edGc61iHvozljNNo

D6aVdoV6W891MUlOILtYCPtqqfZqNzWhw6qh4kbYRGz1pO3ctBxDVhNeQSnVL4zwAJhv7GifjFStjVNu

luBtRFDD9dR02g9+n6kBY1Lii7JpjCCRXhhsusRSU+
auEj3JO2dcqB5mjYNSUnDJlJqkvQmqfOi4jRqwiy4SH5KNhyDFZ9DX/9mIXM9EmP1mM8I+w01YGCawmr

3Bj+IZamlpiwmAA6gqcMV6aZ3E9owK24xzvr6sL2K6uUjiUFwmET4O8YX5drYdksJCrAzEs4nvAWTY1A

AEIurIVvdUVvOIROud2n8L1m7sCtLB39MN/KM5+/in
+WBFAaIlrG5FdLXgXP1b+D1y2g3L+lqv5f0xqma125eiA7k5Q3uTWINX/Ll6HefcX2ulcdARjm27zrS/

T7BJlOdeuh36iA3NlQV6LL50fG22GdL6Qkco4QSITi6UWmmlcO8rtGKBfesbLbVwVwqU3VrkfLl+a6d3

tJLUXteq8dyoJYVbA26UD4HKM4JIPkTmaC/HkZpp6b
bLVG/Iy/kk5jsVbPmFuXPTXLbkjh8SEJgwdBhdrOzGQn5G3+0LBKX4NPb5NGq/v+/LBKBYKix3Jzu1JE

3VwsTjv1Zd7YzEIB1VLLPaW1595Wsy1kM1qRfy8WEx4G7SVP+AXfnsTH1ORKbDDVihX+hN1qBC6BXBd5

xqgeeTQ9nNJ1dlMyVYbT2bYJ2UE09pNY4apBn4GZK4
KOuSdms6izmWYhSf5WjhYNS9EEoKDKkSvyKdmPaL6rLe0elQhs+YFvUfl8N24+ZImnblmFVcylDTmWSY

ObcYs2tUsNuqz+ekKrBXPKkzlx38X6Sk6c/AFsbyDCi0KSROG+CHI+llo97XldFhjxa0mCmzy7MicZp2z

EBvkEGmyB1x57BKYEr8CkNiOzQ5LWh4Tb5Iq5a3Dnf
slXqq3k6lPrN571+ml2S77RGXPeOOdkfR/QEMlLcyModjDtcJAl+VtsvUJfj7n3Rj7f/HltutvMLAIa8

+g3yBgV0UkUe6jwuNkAvTFkWbs1kH7u4z+E+NQbpNXT6ZyuWQ61ouk7dVNycG7U0+iVB11Ys4nbW0r80

D0V7du5JBwjHkqME+jose guadalupe/ynj9Vukgd0sUy7t9jZ4qPl
C/KEO0RS6uN3lclrMO1XAvCyhNxcHWwCP25i3vykcBweZY/jB1VYbMqsz3yoU+cbm8slDwS0Z6RQO8Sk

NiHOSmdT+vnpZwfaxKoxK2jqoLYx9hVhS+TA3eDC593v/ChsRWo/Wg3MCinjvWP4lUenIhrKSJS+SqQL

ieqSbVvZnMCIfC0jyTQkWE8y6UONDXhg7TVaix7w8n
x8UQiV2taWPHk+qPnbXf2TPF6IPOAFqqOaJoI1E/qvfJAUij/d1c25nGRgIiYtOnNRNwVq3d9QFGv/Rh

wz2Hf2CWFKfYsv6rBiaBG0pmU+MxSORROdlSxVzwTEstuGV+XA3ueGvDjwGjU3RqA31cmXO9wI2ujrl5

sathpaoqUcJOZ+VQBdmo7vRjgM0Lao0ZFtUh6dt3Bo
UHvIn6ZamLPRRwZSXgWtzF+YontdilQbsdZaf5wOxoA/UxGqBLejip9tR6qmcvfSOMKwIhRgRpng9g6W

Ou3sMOUzx8pf0xuU0njKj5aaUUs0lhwNYI+TXQjLfdkX59rhurfW2RxO+8PgMPbITew4ytgSOEPxNcHW

6c2M6iD3VBqJihFT20mZVsEVqWQwlu6Lx2BAueyzx7
puZmD5SfGgH1Dh6uh/9i/pAz7DbYNnNCx4NtilhHz662yo51M/1U+Y/Nl+WLpYD/yOb//8znVf85rYbx

jDIS6flGLO5e5h/2rnaSAlBpf5u8shlPwKI5/R/sVQXcVbLrVztXnH3aAbsFNamOE3nD6HwZwDQ5z

yBmVvXS3LC7W7vGoRitTdnTwh4kyIE8AouF5YDYz4G
ubwC5bSR0mHx2dB/Ex2482+X7GR3iApqsetmJXInfKb9caHQrYcrM3GSHf4vq2ul1rJYv0L280W2tC2+

J6DDXtxYNmz7KjIWllYoqiRkYkm96E4JKr04WwK1yoXmiwUWMmDnxEgqJ5SPM4Vgn9OV8eP7txKIxshB

CPSQzlk8RPVB2V2TOxWj/PQgusdMOeTvZWZ56EWJGm
OyWAGR0gkNl++AEK6lgTkJD4TqSkh1FKLWU4vNVKSJYwdAkbsoFCWhYpSdIkZYNk+nKQRnqhLiRePllC

o0AhKjFW1OvPS2vl90KooidX/mcbx6RCjr9HBzIyPEDi63sV2eilMTdGuKl5aolfS4CPzOF8Uc3rRvYe

e+oOVvvZg9ubu8+ORpZ4iK6Ttdws2APAk8/seTQ5BO
bYtVGLxVrkYnxKvehNrnKIfr/I832BO3S3fFntySrO5KIcmz5zAS1aKxutpwYwZFWuUb7yCkKPg0mhpk

ZvNLEF4uYElLP5yhbp7mAgjIcwyHvZqmMqmcNr2pD5LhpBH8VoQhN5bf3nXIVTcNJ7CsZiJMuB2bwDcE

7OaZOVsU/R82sHsarrCkk5tI14yq3LYXvT27Qj1+P+
an6V+7sGm973x2QBbFjBfq5nrVPBx0+AO/6/YV09Mv0M8rbP+q41J6CIWcYKjc7hc94qhOPnR20+aH+0

fyLukwHMFrsgV4gxL2RQm9bn7499wo+62Up1fT3D4zBbN67WggJPbfQG9vi8pof6yUVsepb4gMeLrhQ0

239OD9nWQWp1BrDT8DZVZzbpKntwXlS25TBcL+MoxN
MdOjXfJFkKazYMG6Pf43n6zj4OT0+n7EADkWHYplLFgUM4qAQhglwlGG2R67IcukCmBd4MNT6y/2tZ0n

oY0X+DsNxg8Ph8hJzUubcJv7+5fCvB+S9wJLuwq1A0M30esHuuOA7hyB74XDKKXXrX4tGo13M/7WSPsl

RY+cfEVak6d3VYiBD4u6vB1NYGX48eQRT7aZYSzn/O
fY8E8zEUXSyv5pseJYgbtfOEgB7nHcbW20iqe3M2gngyoUpFb8Cw29HoxN18NvMzPf9Ja6zlp168CO9G

MgGOVQx5hIMp8tJ54x0c8d/5CNLP/IIhodwYyZ90MFB9TeMTLTeCa61tgGPeE5muvyf0oid1bIuQp6MV

9DX2paU9mpnYHJBVrsn1gIJrY+lGMFeUMFwK3cZSYr
c4Oh6/D4N4IYH/J6nhuYXCfWCXTydIg6K8feQB91pyYY8I2gkiKo+aRUVP7phnJgtZ5gFLeOc4cPiWzQ

DAZHWeerFkAbdw4wfIZv9q5RZqLCpmXrK5SbY/n5hy+7B1dwCOXQszDifOUBef2cXL6kHXmlDMC3nXzS

mE4J1b6V3gMxSh9vdzwlATPNgl/kk7L2kQ/8ZUzy1N
D896w31gyh+YrURX6IX20OSOmsZrna5o3mJds4fOiK68oUEOpv3wG3LawwyzQGhhv03nFp0cqkCpC0QY

+ZeSYaZbEBV+fYrQ8xgOyp1yxXhP5nagjxYwsp98Ezv0St7TadKttDyPiDtExLA2R35T+sgOplFcR7MQ

yyZYb6xtUvrCKGaJG0Yz1iZQ2zLTR13OoE0BBEaJV3
XrTAv3NdJxaqX+eVNKnzivt8yb5p2yO18P4jXiTDyLxoygMlX/T9ckhhNsbZUwqCEwjS68hSWZmAVGRr

s2+AXshHnUQUXSByzFACDqERfiuRhqdyUBTkIZkHhubZXOsS8Vh9iTdCoqghesSxof7AQVK89x5mQZ0b

c8mmTD37aFHeIwHY4ZS57gQoCFaKAzZ9Ox+q7JL0NS
V/t29v2QfWQ0b7QyqmWEY3X4aa/jtJsu4/kSrqnsmf+PV+xv/MfwkjdAHg3xnlaq1PlOHFReUY7JB4ZS

rCDk42VbWPfBode2xFLFqH+tj16ju831iY2qC1YCk5UcZUGmxRWbXAgPU0SIRWkhdIbsAmV33FbFVFcb

6BHhHCIiwmWHYJ4eYCm7KRVO2Nx36rb1gWLVD4/vF0
/3LK28/8XhmOD/l+OZX2W0el9f17cW7aqYkqlxIj5Wph83KO7+6+k+l2mTGIUELRDO0RjwP5YG8lDCQL

68kLHowP+hkTuZFFHk1DccCqY4xW8Uex1Vn03ELOnwiqGkQCQd8tR3movr0toQx0nx4ZuWTz99x8tqno

6b4Mw7gTck4nZdbc1fHa3zgyruuzJwwvzd4ULueGMn
d3T4GlCGYE1Xg9cfE3T/AeHnltG/zV3uMWIUd2TTelYpEdJW6fqKb3IIzf0Ir2FGMc+OyUQpA7zpaxUf

F6RwlBLobC4GqMFHAxY9uK1AeGAH56s4bUJHMuA6FPvm1WL5UgghiiUFeTrfaxxXrGxW8szZM0SQRfa+

p2CX3SOthAEFBD3g2L4nV2iz+2SO9USEKSJYQD9WTh
mjAhp6gS8CDDoZuw+zCsYV6gX5QSE5H8zMnlw35fU2rvEBR2v/UxxfRaz6VF4EcuUMiBHMWpKEL5UN9p

dEdNnSYnMHNu5HKwpTODdbagdtBdpQPwktgTbDKTx2ZAvim7cLhPkrJ9sqO2jJTa7GjA6X3rO1t6AZLK

SzDn+w0naOoDS6r5Q1Dbcb95ZW7Bx9RTW/v3L3A7Mt
P/F46bP94JF+fPz/jYMKcV/66pBL4ieZs2+kZ7DzZ/+QFz2Ln1bZHjTzN4tagYSl1nC5x89RD83fgcTy

I4//kQyw196rlSxkRT7GPetKxmNnWOcTCbuTpo6EVwDer+2WVBJzUiOulFwwswIqvtxnVeCW4k7czs8+

TYRmT/i1IGbMU/w3Gt5me/akmW3/xvg98TM2GfNIHe
VELl2roymRcGmGs4N1a5vElIlfFBPflv0ReDbuW+Aa/wimJ/C6sFx3gkZudEGyvkHO113rW098gZhZk6

Gtu9ZvV1dgRFlnbW0k8FKYvC5h5l6JdiU9lRheDkBUKgt9ijUxmDdfE/6a39euAynoH3TdU2pY5+/PTK

egb5O+N9Z5dFokok7T6KNHKqUlRKiHR7cK8v2HQqG1
c2A+ddl8PiZO6GvUjKdMBFtqjKZddGbg3LLQG4nhgwWurPpatGef6cPIo2tBT8PzRQuHwq+D+16QnGKv

a2Z4pO2fAMgyAdVnxLadoamDmMCTN5NzPqAvYNSii4vjfsoywz1+16gwHcjkU7QniCZ1xwVHM2SFGVBD

48wMCteDbBAOZAK1kVffz5pjBw1eBJek7oFWtAdbyT
vPxVE4rtvRwzK4INOG6geAGln38F1n4VQ/saArR/wl0s5khtw6VimnHn/ReZMzClX3tL+6h1SzkNOcL+

BQiwYSY2KtLw+OWL77355qw74d5UawJwNKga/d+11HmeM0FFDNrCYcTtEoJGMujP8yDMcMhuVoKrnw46

mKENfHpiNdJ/oL0QanvkzL1Bn2OiQybmxPi4kOdk9T
8cjKSeoL8ukT+jG2xLA47E5/8KtLRK/fpwdQXjw9BtlsVgmU4hksr3q7HNdcrpwBDoy2LO2cfvT/h1Dj

uzk+VWPGViEGrKFaRRg+QfG9Ft+BuVDZD829/XjCaXysDPzpUMzdcJ4a995aEPCPapE4jbk2dRU+UaTI

9OBFOoMtaoLDKNpfHgx35c4I4l9Ey6T1Vsz7FsqNeS
oTCkayKNEySpaE8ITLLrAkDoOV6a1noO0rbqyZ+3tiLM14A2eYEWEcIUiGuxZdz1/wvrD3yPOi5HNDdW

5r2VdJYZwK2QIIxvoPR4aPdMWgFO/63H/QmKlCD0W+zcS+Fhm5IJw1gQLYBERBCzjOBsuUKbuRjfqKDJ

zcKGUt2HjaCQvgynbgqP9LWeiF4pl8Y7rwSWdb9ykx
uO7ErEE7Y6ccZ4Iw6XlaYBOTpsrp19R+Eo2nPmYzR4proQYVic5YApxBdgUwBIiqtqzfYUWSOIbJTgou

AaWyGOOr86N16KEtOlKs3WwZAfk0z3Jn54pPx8jr4mLdJXH+4WBIsFwJSRxqSY8VXs4ENky2A/XdWoDI

6TOD0z5OklJIXX3YGT5nu5XV4Xqys+JPKU+Z+M7Jg3
+0xUt/P/wrQOU/lDzXD+EsTMu0rB5VXTjsoFiRP2bNIC+v11phjIE/68k2/ZcjDf0PzD0Oc9cEtuvMJh

a+INvZlzETNCgnts1dyWT2wC4ReSyfRSrJI53I7xbjdmJBVnIt4t1GNufbjR914oVfTIqLAs3/aOfx6T

0fH+ody7BePQ0IHSgkWQcZqjGFFuYkOb7u3yACASzG
Q/wqiT+jvmdvVKrS06OH5Ipwuxn98rqvQDbkeWn6HRhROpOdJ4F0zM4Yd3JUcULy4skkM2UlxErOVEk9

Wo1lqCS3xfoYEKdzKZRR5UshhR/uT8BEZfZehUaUDHEOlcoWiscPzgla1NAF0MWuaocEMg18ofa4bQtR

t3mYdgOA1kD7u/kPfL3tD0fEGRPTylYvJykgrLJOY1
WrN+IqMnyQdFLgzndJrCE8s6rPNVXq0xb9NRkqd+VrA6uLiB/qr8wiw80slW1DlHfxQOuXj0H7ZmTEeu

ivM3wJTeG/xHnAMKLDEVeqIv0SVXddBdVeSZAMyE7EWq3fDHqu88bT3ARRWSWOy2l9j7ibNNHQ/Od0Jv

ib0gnVy/3YORiuRudnw8maUg/2Z4fpr4YuDKt+obmI
Z94a3Q9GhtU4EcAkFA9HGkgFLejNvxj41HFLooovAnKyhGQBGJRH6ltWR7ouIbOkKY3EDR00WqMJZtz7

xf6FopeFDVAkcJkVVqmTG4gr4pvd4h/YBiKBsgSs6WyG/0wa71af/2qPiqAN3IPaxmnCO2t60NiaiK66

NYlOYmoKZ5pgQD86UG5S+J2zFjfy8Odl8bQD8NWWTi
QewxJXZIc1VXULZlFF2jbmITUwDYdk5kk9kG1PC/pM+dbfsi8ojtmqLpIHv4PGjPt5favWv6TzOVZKuN

NeG4pWdEeFtoJFbD8z62eii9VNjHo0dbctGsufcO2/AtWlOkQnCufwyOznLYrgaGLxykXd39d3sPLOn1

87bgHyC1lZ9uujEjYRHVjdgc1dY35ZjgDuLf9c0Lh4
lbWex4Be0BrFhsctSdKr1zyUukKAygaumHtMErQM2wzPupv9FJx1mZSjBhiZYaewdACYHU6rfQKA+dwE

TLO7P2IZw+pghS29JChFzheb2K+pxZiS9NLAUtjdFi7n3xhDrMLzWRZ6bML3kwcGfWHHq2+te/Yn/Lu7

Pjjwncno18oQwP/CU896z8iME/0giDa5dtbCpaD6O4
OTF6JRVVXXkrgB86FImR3v9adN8zb3+Q/csf8673XehbNiL70wFogomSkJ4eq6AES84BRzZKB6tDQbjJ

+FEjA19MGOprv756o5R0PgDpiWQC+ndpG8e4GzMxmTQgzTwFSdN46IItCE+Vp6SAveK+kB8Z5ZtJeHGo

czBtj67KIX4X6M9AvkjYg4fYlcs06PiJlAK2gZpq06
p9GqDaUYBwXIH/SNw9pjPpxDl3ztC2YvbGVQAn0Dx7OKUxjj+y1gQxQv+5wb8XlM28oG34F8xpGRZ6dc

cDEBWr7xDl8AS6WRZL4BgVgkfbYyh4rXOSjtwvb0kJwj1JPWP2qrD7Yx+LsPZzHdjlCjAtzZ5eZCO+10

rpsCjaNpy6tp/ZSKMNGYHlpxJHgrXi0y+zppTdKB/R
+CuXOupZ0szuZK3WIZowYolDrxrm7I3hneYEUO5twVFWgoAGscHgTWjhWfvoSjgCANSHMtYot0lkYG37

kjPvJQjBbPrfTde4ChrAi8Dqx5PHFqGa0L0zHwxjqpNdQk2v5W6Oh/7cfNmLlGSSYIV4uIP/GN+XGVPH

GTqxwpquLycdTO7UvsM2gqjDnwRBLJROVX5Vzp2SDt
oEE7iUTd8Yp4CekD8wWjGWD30r/uJIuf/2JweqS500ziGrCl1OekP7l7pC6DGQgxwOCXu6F9Mb4ljY4A

SotyO/jCLWI10pzn6DyKKvLc3v35xV1J5aZBwSFQQhUSNLcMAaF0U26llfdJ4MZiRE69YaWem9y9uOTU

1SrHOTLk2Y+Wh1jHrnbm0K/pBRpJk2qkGXMOtdYbG3
iO+2jC/VCjqOsJMS1Fv4nV3N88PWday/CL9kqCuEYZg3tsksc7X9s5Yp6wC5BVFfzltWoDmva1rKjBII

94mUxESqlp3SEUWsaVB5K4E/kTMephQ2tee02sLEdFBxlbTUMSTLtQJaDl5uf3qxbzTZ3k5IU2if/ZUq

YXc2AVArrXCwYcRfqSTWnyqoc2QcmXdJGQGBxr4jQT
EqkrmvjAoZfQpIMxPbco8m08oTWWvb3WpwQS9md1xBEZPbI8ZJvuJ4ZDZAop3ZEExvLMiSKSZz8FOwbg

5l3KdyD5ptXvRonpA0dxfF6inMGf1+ZLRupSQ3OLr1l6Vu9FDs9YTrCXl4pzxURAh+3PfCr5SOWDm951

BObEaK/fG/FWsiTFsYH76g5h1IUBisw49dsjO3zRSy
zJ5147pYxJe74YYmigsq3j8iHlMYX0FwljxuP1lmUCzQm0LBZP7UTLaNHh9mr7mO9AANSUl2Qlk2bzIe

Uis8iuIUxktwX7YepXn0a9DQk1i7yM6UTewHXs44ea8lrnYXFWJS6+wUCeEKMMmtIEYN9a1JCYrU2X12

SMcVivbKyfANmpBor7jLiC+ta0omXXVm+vvrl1DURT
P/gR/vaUft/qd68jVgyQ/ttXmXR8hM7c+RKpFra1ro/RCxl1f6TUupic6biiWeku1cuWnqQE4uUp+tNy

4i4dfCnhpljELUIy2pxXTMTImWPWE+PEBUwsCbxKHHPxPtLSQvRujARSgQImUXmeSD/fyXmzxAmGnH7I

3uSC9ot8OZBfmwD/emLRxAqggWX9abx7qQlNtbo8be
U/JofR56nb4L7WUlAAueSSDaqiilPjjsIj36IRLDgEI2eYFfmK9SRUZQ8OAiHqgCJHWcVm0sDvK3vzgf

Z7ILR88zenzfF6yF2/ojk2HBdu+mfjCYBFaneluDT8RnWyiCAuVUupB8NWHFF0TUhNXKheL/OmXglfuo

PFBMlPMyGcGs7rDvnGCNOQlpQyHQIZri45mSQNiNuT
0Po3BSqrKLDfgS9dzSj8/4WeiiCP5uXnqKeVUYQHA1YY8J0yFaZIOLYUkbSNUR4BhJJBX6UclLOPy3uT

Ux587dlYV/66t4Yz0XumcUKqv5zjfsPw5m0DMI6pasdCeih4vX2I2Hro808Eb0IEhMPHOuvGjmGHlYSu

xBeLzN0U8NCznKvzOqLHLqh25gMKCorm1MKjk4Iqn0
SIrGWlBz2HjCyBMF5PkvEXXsjT1IPapv4YwuuSLCp9099+81Ku/3G5pmVt0l0VOHSpbMdFqKO6TaZ2Rs

y+xCVuVgqf/J0s5NuuuXZ7LT+KRq26PxzEVcXXWlHglY8L0IyxN2jPNNj5umuI+i7Al92LNIoaNqdu63

GdWCZxWqtIsHN0Ybv+P7IXTq5ZOVe1cPuUc3X94uw9
MmCz4Y6yGRAGFSSYTdgCd0T+RRmOJ4driMrRu3qlubCLEBtl9MAShnIt/LNdyX3or6iEzTcU9JTDtcHS

g92WFSV67KN7p1OsOclxrBnKd2gKJHgux9N8Dq/VcIt2rHUY+NENVXs/8NTR2HdOKQngzOLCQOTLk1Iw

NYBmDiYB6mdZ0lrZCjl8s1aLRPORN9OgDDCRr7QJST
hlP9eSyl0+Lv/CoG0tP15GCeRcTE6m9vRdAw6Qv36yDpx7BRHSz51ONVFsfKIxWpaQGnHZNBKB4JapJ2

0+tMm5tns7waZDYu92NGVeKF1CPtOkzB6FoROIhCeTCFTOqbjYng/UpyEBUoSI+v++iocgRLbc1qoxpd

2o3ArSREBQp/0VsaLmJdk3VTtt3SJr9AJzJsVPbNdn
/fmHqOPjZ1Ob44QGW9aIGI5ou60YE2w43P898fh/O9UY/PnZRVk6MLNT/2OLg/+iDh9CuRM1+T/6Oz4p

WdfItid+Z69cYlh43MZKItnKW7E0EtExtlef/oYrSv9lB8d/U6Q92M5LmCPTYAsYE04H7SbyCBeeyuF+

M3mo8CjrkCvKpK9ThUh4hDpo4VggbiuwKN4YaTCqY0
Isk2MLxVFAjhLItIdHZmJbSecQyzZT7oLK+KtJH/ORdMW6psc1suV7R9ClaoACVZb7rYVcPNwr69DjFF

vSANz3bEKxlygmj5Bxy9US2EYEKdBrXu+mq7jMipNKOG+K30IjN03NbUevWA92V7aZQ1CkqeSzPCAzY9

da9jW6BBAzUOJ4wz/A++IL9rK1jY1Jbk6bskouNZOX
MEYFysekYMh1+lc6QINPePdxYpgZlUzbQNxQUwW3C6A9o8wKJyokZquS6eNuhgTxiknt7dIyZ2etb4jF

cftyQqopKVeUsVaJorK7GVyiaV0eZMMuZSEqJZsDXf1W7Pt9azU6DqiIWRClhQFlqoGHuRdpnJLgAHSB

zNiTuzF2pJBRiwWpeCJUDpHY2M9p2fBQa7HThSsQkF
UbNpLR8g/5S7ex+rtuU/Tvnu6i967PNx/u6x9b1CEfD8BpDWRCZgSn3lasAoYj5UYDk/Ob+SB/L9v7aD

nzqV+Amwo+33DIb1KjBW4JgFd8qXriBbxZ2MXx4RcGrJcYWQu5aOed6RpRhbEHhsElwPWZrlXufu3SFf

DWJrdyyNjxnkISgRYenrXQ9c7v+irLQL+zsDYF7gbH
lOvZt+2fg9F0q3fbF2zAiIF7F2ja0OY9rf/XEhcxd+KT7ePdzgkH1/EsT6lWwOZhWTu978QYYakOhfdu

Kju1JZoPyq+zPAnN09jWkf4KW5D0/oiTtugdaobnSPoGgRLdlw8uOju/C7camzd8WxFjuh/TXwpsNItu

se35D2K1ScyJqlJINdclT1Lnel3SXbHZN1KoomJ5kc
xuojWARVDJbn0/QaicHiZUwesCIdZVa8zsGzz3gsaPQCXcCGlp6KD4Ib7qK5Wvm6glDdCEKokvWSN+3o

KVifqBq9SI7uvdbWh6Z7xUYB5nTIjVQqWU3Wh1PmfSHErwe1XIuoyIg0OJB7FE9/Mss1Cb756BHNGpHj

coNLa69BLnsgTI+FHTZ2GyLJWleb/vSjlUkrEtlAG1
CBhS5RtQuyAMipUg/x6I4guhDBbyr1oQGOnjajNH/YMeYV3H5IfOtu0uD9sl98wbcp4DUydfvoUOkD5X

xH/aIFusrRLPntB5le/War7UD6M2uuUv5wcbK49dPx+20ndJjSp54gXqsmFJGUrnuFp+EXaeWCie8Pg1

8QNYYaHE5+QB4Jx6Rc+2LkTYv2h+MBxS7vYLS3FsEt
myrfH6S12a/ySTPyu4Bt26CRT2hexKo5CughNefMKILi7Fv0U77zoNPoRl/L5RHuEyTLh8UXkKkI+jsL

FdaoDvgh9cvHBMQdiLbnRgc3ZA15erN9aIKNnF3/RWlnR98y3wqq6p4aEktKx4eQ0YzVF90o10S12Pju

Uv5ePcnPvY6HlD3e/khmONizDW6orKlTYUBVwfMuWm
rE8XUj6WvZpx7FXIEluzMYTaz2gIz+xzMnsXTK1jj6RaASBeZKZ3HR8J/ihm4PSXTz94xJBq1ph1SOcs

bpGbQnPepNjPju+YS1s16faqjjDAeKicj3ukkj8ead8iTBY3Uj+0KO5KtOh1mvyv9JyooMcy+WM8cKHr

WcI96kww20Lnk8yeq02esoKInWpVp7ykAuvOtKEniF
8fNhCFSWMYSq6zS49pUS8VJunKZkk5RzW0dznrRt1/vnFYCWBrXPnr9tiZUYiLWA90uhc/wWNKZnSls/

NYZnfmEHIik7s6rnbS1TJ760I6VumtBXDYXFkgNWlTDgheVY0tiyLQXKhGMtf90DxDse8tEsBWzCkdMN

KjQiqjW7O7mwmQ5EIgxQyo6Ua0UhsfoPRRLkCQkiXM
xAE2+fCmHf2Jhre31XiAK8gAr651J1XqJ9Dm9v2wFAalmBeTcNwtoGfCqJi0PNe4JISJc6o7byKAq3eM

fIxXQMHEXdKE+qCHq4XQiOycB256z55d/0LdCe/5n+eRAWaZ9JJ/h06jZ+O4tdEINtjoo57v2tztHcNQ

dXIKc+nL16rKSWYL5zqIbGyI12eG2pylcQTp0sI2sJ
Xej5wcqkXMOdg07ysrNvT0VyuHQfQMT5dcujXh9vcgLdFmeKsoCGPiL6x03a2Jw1XlUEQwEeQ9XdndTX

TWI5Jw482IKoVxWvGf0o2TrnERQoT8JbqzGEwr5CPVkQL1le7f89ujFmgm81Qzp9XYHmEpoX3y1LfTXu

vMldbiQw08RMzz5QctRZKOSeySjJFyPi1d7mgXPcRc
PO4M3zHdBsrJpo5FvdibBEfXRoLvfE640EpN0EX76tZFX38gKMEOASjsJviTnuCzxTI+foGkjgeZJDHI

HW7rT7mfpmv2TBRS8AINXL6AN/nIu9XshEwBEvii/r4wVocjezOCWvZKw1sbJEtcrOrXN5OKhBPrxP7N

I/X+yvA+owVLEZywli2ITBQsh7BKvcHMEPA3uT4vQ0
MIr7qZeNs9n/02xuMvhdaw9onlaxUYdmeMJ8ZiIBRBfhCxqgr6UXMdvRw1+LVHZrbUt3e8Srwm/lXA6+

n/xZLCprUIktJ8smy2AFj49iSeQ35pZF+SUerDu85GUppr0FSI6iAL7O1IdWWPoq5MLfw6ouKXPid2/J

zeDtX54u3fYMFtwbgGBsSAMKOEXbD3BPS/A8lqPS6B
w5NbXS8aINyWaTKoMuH1/TV9WrH4oRmppDLmbrDq6ncR1kiQybFSC0f2XNAZoW6vcxLsKJCteRlH3p6q

g5D5kFhrQU8NL5cyDTFXF+scLdoewnPD0xOr6ITzhjOi8zPspcEN5SYMxMYAQChfWJvJJIx05R+D6Pb1

KvnbUPRxU35UxI4f98uxuQ9e9x28lY1jhpBINtY7YF
pwo9YU7AGtCfgxbwNHxO9wR2Rh6GPOBa6HChXkEhAVbu2/WrJSm8+SQhtIb/UFU5tQDKN4Pwqdw0nCON

zXiXJKxx2mqwm9ovC7C7ToKt5DcEESj/s+ojgf95heftxkFiFRLLK9tZUPDezA4dl2YhaqX4g32jemeR

ielPVbVc8kehcCy9f7yeClqXlgZo78xij7Dpu6qG9D
v/YBj/6OIwN/Z6jsM9F/FM8r5Rkxf8DXfBHbAoT4vFLY8CtAKDQFBeWYoExxOnHhKIRai0QL1kEa4iHN

nQRdysljGu94t8yOxJrKmNnYzTQ42torhwD16kvOi9jBpvTGdgvHRSzkcz2DXCII0iSLtArmpI+jHFvE

tRQAKp8EfdIcU3y6GnFHdQjDffcSalBoV52YW1gDHI
+wCEIov7ef8zJWERcYjW0gtwPQAHmjhmIfeDOdsiIIrkj5Rab8Yn0OTzp6yrQAnegrG23F9+IeAkobcS

mEwU3ksDyU53ALbuROpKCITumlRfTdRv8lp4o6OGUBDntKckqxaiQ666Ho7lnywJGaWZf17bp3Ak7HqA

FA1HKe8BVuzU5yO2MpGNfqE28g1sha+aoVuymOfGtZ
UVFm4QmlI6LwddkYiLePudYue3TjKPyygw4N7esU8soa2LNvT1Zgu5fs5K/Ipts41AlUo74mXa/AV6/Q

/f/n0n3ilai8/mAzTHMhlKQdakECBRzxq3nYFGbwbBVairN4RBwaDHsssKIT9Eo22s9A25O177b3/fze

5/e+n3keZ9siv9/9yc6wf3+1r+43XAKySQglN9TxM8
SmJSBkXByIM0OFUlZoT6/pBvmjsskT2NotWSNgpZvnIqVuaMsk2UZabjni2qlDP7MOE9YWXGtYbEKmSl

KxMhnqzG748TJBCOmFLGmYwhdfUc9hflTvYn6I+QYmb1jZDbGCHRa+8Urb9PHhOWLdTsxNLLLpz41XrU

uwdu7NBc5aBoRZYxQaT2aU9JIjEwOdiuamz52yGVvo
hRO2qBqc0zvUWUT7nUft/QNO1jtjG1lio6LzwYHMoxsjCO/msYeIfxL7SnszPkAooueVdOiGQck4NAYj

I8SBYREEih//zta8MWTSBEwpkSqE9TpwQhocPWxsVillWP3RjblKAeltGfDMGdhKnNtw+z7re0UDutbK

Ey8pD4fOobYT1tg345vZGw24q7jJHzOudiPJzl2nh4
zM95ScCRpOvSJJj41Ppem2Bz1+EQQP0Co9xeuWv1+/KSMfHqGr0qokTl2zy2RowoIavfDKEF+g6XkkuF

mcwAFxsFQl/MgzIhvpyfOLhNk1K7tkpoD1eoSEGMkcqvO+qVWwqtpj0j1Lq/LX3Bdr6MGDratLDzAn6P

7QXHUvfa+LXLAB/zjU72osoQTnSmXJ5NvEhtXnukRa
ZZHTjpU6J5RcxlVKtZtKuNCjueW85Wj+/nwD+HMAlURkbSv0XZ66ZxSYvCAzyJ3l6KU3pKBFcaLXlv6h

VvrH4EMHl6hv8q3yFtm2bSHgf8KSDV1NvCzHkJ4zxCWiE+bxxsl3T+WkY+x3RlYJV9kdb26PsJvYMBRx

9AWxnLJRdD5Wo71pDevxC5oYWkT3dScIEDiXIbeA+Y
wJy38aryVWNrjUBComuSve74eYjfgUxQe0nQLd1WCYh+On7Ou1scHan2jeFeW4xfYS1xw6WbHFNfbbs0

c8qmpbwuOqha512cOZwNuj9th09zpWN7FAKhHTrvh+4+MmkK8vY9msLyLShnTqVkXr4U6ZeOhAPzCkSU

lckGQB1YB1PKrwQ4BtOeugLTdcE43P7Gyy+KqX6fAH
FF3Oxl9dU2izgXg4645LpSQK85KzE+C2g+DSRqVEWE0G76D9H1hVCekpTGTkJygqLpeniSuVUcQUNg6M

R2Y5nq3iaVt1LupL9DWXcvhRL7LQzheSrkC9ZVc86NXPUQcVAyriRP1YvwiGtH8gSsO9QRLJiZX6Poaf

T+qZw6yZC6GJPoaPxwmsm7AHDwbO44AZhO0VnvPUCP
hBusQxPCj0EPndg1uzCUesSd0vz24W9riQoN+Mzg2PQOf48ygAM0Mn48S2Z6So3ZrFjAxezAR2wBkXRu

2dn5xEK9gqMXeHC/by1+FN8GxI8w7mPULSA12l3+VOxU7jQNIeVkVJNmnq1WxwFAOoZfF4P1ZWhk9C00

Rwbi1zCXXJJbigqR87zKlTxRgGd9cUjir+uD7ActaY
xLGuwOKFW0Ows1L/I6zNt0cOua+Pp4x91aTmHPCqQLZDfOpg3p7hnvFl819OawyQVqGC5hKMC97/X79A

YOLeNubh47XEu5t9Po4B4Lb0M5vcfsPMHk1fn+dp1tqLK5qwcWXkChfM5kbFIblXlk2HSE7mQhG3X81y

dqNqpIUvVStLuNHPGtlhjQtyEJItcbocrhjsEqpT2y
Ns2Eq6MSRT763P897CYTCy7vpZE1PcklwcDupATcNnwFppBMHkH4ueigLuOOpOO7TZOn1+UjpWglZRJp

gfIhqKJ9hTVrfsMvZ+FlmyyIeszHiIvEWIQChRbyJagchF3ZeYu7c33CYI+Z6EXJs9S5EW/JTSbk2obt

Ws2O11+p4Gqe9oW3Jsx8F6hyEx7ea6h7izuUqX3aAN
/xGVrb0ng2TIsvyyTzCSqUo0xNUv4hFfAt4Vnr7QugJV8nGeRBmQLM1kOPLOVrWV3QkmRzleeSoGVAp6

nuLq8rPUtoIndZ1wVnjgdIrLSPXv7yBmghxuTNaZVvUexURof9xFIzYKPcMgzNTtVw7+HFyNiCFKrFHQ

dKaCIJJpG0arn0vX0VUplmGrLvGUpNP57IQvUc5PWp
9pl7VEw4yF3N5ZbuxW0DgotdMzFCE70/uQegSyEGp6eXPczxG9b5w2eEehKBKsHxqQBCnWXMO+tUwE8P

t+RpijJvU1b9W2O5K6ZkGazzZlRqWCsszcReZ7zhBVHs2cIgnxYUbYNqZ6lUk+ppOKJpbsN4JptliZth

/YhZicXfrJuOxb+/RAhZNBmTYQpeH66lNYLw+f4slx
zXKgNXnFK+OsgWsyXiBW3cgA5iwLO6+9KKOzH53rO4jTFs1jN3MvmAIDB5pqujGfwEigqMGSFzInIcwD

jvn6swnwlvHjlhqF8HKLLx2vHvE/lc2vyNOGN95oKC5g9bKp2Q3UPm514GLWskvHJDMjwDDp0db/clbS

/2okM+pz1t3DlNx/qLCiSssT1i5ysBxLUzg6vwDevF
fgsVBuy4Dva3pPXNQxXsKUnxFWp8YMPX00KqRg32FpKqwIZc3Ib2CxEmxWoff98bjEqA7BQMcedPivC6

5vJAr8myYi9lrBFqcXhGPQzseeKxODXiuNQM+gx3x7UGkv32xZx5tbyu0jc1x7HixG9NfwCfsCABIDmv

U5VHvyg6l9SgomC3Ff9omzClQVOSyMUZV1iy7oe8QZ
+tmtGpcx0Y74drcKxgmCGNEo3JlqBsWgpvtlWVYJ5byVz4q3X5WFHyY7CcsVcRZB6wKHGkss+3LgJwzH

OJrdumCtrpfIDE+O7Siivcz0hn+sBYvIjlGJJ8r0O/4uUmfIndLvIYFQYQnlsODGvnRoWKXoXgbQnF3x

68SWOg9no8zPU8TVHJQf0ze+Xi8wAaCDvMQmUEVEvD
SduUWNKGqgRjeJe8Ltd+XH7a9BV3BDEJLTCkwmMrtCdZ9LkoGg8OFBo2/i2jeHAX3n2PK6Toh22mHZGv

NWvP3rjyMltvB0K0WKV+urFiAfcoog42GqF1eogueV4FdBi1iKuzdKD/8/eDnP+E5vG+xmDK3DHCJwjR

ETf5MLP6tXfimuXCxGGLEhlPVoSLzfcFqBJpsKyiYK
8DJBCEHHlA223yItdYNL19rKbpZUc0rNLgOeomHbDi3N1CGNcFhRwNKNgiw5ziLQxPVif7jALvc0935G

upBlEDBy9gN5w9Ml8ilz+LxkoY1RszH1wumeBfG2zYT13Oo7I4QXwHVn6LDup92IWL/fHlboHpvgz8SD

0NGckUBdavuXIeB9/P8UmB1iIrJ0tVUGHtuku/KiGL
jL7V8LtFBbf1i+i5ooDPnCsJv/jOqztM5wah5Nt7G/vLD3i7glNOyBmEEpOMxaRpdDD/jeIY3kGSoiy9

umJKh+e3npFHMCKZB0Nse+4JLVK4Pfu4kDd/bgAvqi83uW266hKzVe1r5vpLfiDa74t/6ZOwGXjQO1cf

ykc/aqmQtAfpnyTkF41beRiJawyZARV2OlAe4Fkvvf
wp84YjYSQFJ+CfmbXgNu1A6OMMmWeEU8ZgKfeEEt7XHStuMUgbUCc43UU1rrmC4nn7tKUc3OFNZQkGDq

qSKIfWhAtdkC1qQwK0fp2XGEeaztS0isPYcywJKBQIVTG1yhudriYxgcZnSVtHl6ex228L1K78u9UADN

teOzzp1SWF1pYvruqSQ260HYAXFPok1vJLHcQID/7A
Z9fDC9M4R9EQ/mw9uOmJnm0sYxlvPAsHMnZ1GQruZqxF17DqX2KVyfHTyM3qh/uPuQPOL/zJjTrETp9s

98EsyqWVnEWuHLthajttUcHYXShBPdRmte6qV6ZIa2EMJwK5XSenb9kmrWSPO1I6bjN/w68zxPMd/WFm

Sex4lrPtNGiWPIftr+ERPIFK2QeCY8IT0v9YthInMg
JljI2ZMJJt5t4yXiTLBfqpSOIgb6FuNWO5r29OiFtZAK9dvVX5JGCajF6SjJOS/WB9iNQgaaJNb5nURg

K2EQC+ZcCd4QjA7Y4h3DAgH26VHI06tMcKiR1EoKVgyjNbLV3QFZ96HusmWMi/uA2/eJaCJUJhRlTQsl

6SjjE2vX6N64Zme+ec2z3jC5493uR0DRz8DiV0PQK1
go3UrwQ1gJU8XjeeGXrwYupfnx7RmQoKcP3Q9ejE4N6snbQOBBjnr9yguc1RbXRhWbJRyF1BICzGhL1f

XfoMISdS6hGe1K+s/5mGiuKuZetjIsCZ+7K4hm8M4WUZ6cBJhy+U2y3lP9rgAFTXjt8F1HGc2+Gc4sgE

OeE/ZglBVYI/C+OTiSDYiXTnvaJF8FmemuV8f1NOHn
fIJmkKIpmwkAGwgLNSzccM3/U63Kwi4N/A3msE18jM83ZO7mtxojltzWV4pfmsp/IAZ050cmAuGp0bHx

k9+RfN4JiwUs4Q6qLh93TaG8k9wLrEXnVNsgY3bnE12jLtIkgPHTeb0rFc8cv8rb1MJwU4n6FGJF8AT5

Tw17voGlmwUeNud6c9Rf89UqiQ95yFjuJFAsflds4R
nsm3s66LkHcRlslF3RvbY2DGYP+j92d2bYr4r1u5+MJavDLXmBoJRUcNYuTz9foyM50TWzdRAfwXNUA2

Izgifie8Jm6TZOLd7H+uR9+Mlp+GUS5lcaCUMLrofYtXkwBmCHi7IjmTQe2Hk7ubxFI16zoplC+URhR8

UvLNkrp8nI6mdi5dcT3Cm2RlRt8i1jD2EkoTp1VIaw
uFmCqO3oTYDfi+1WHYYLbIlSqKdQe9LZpgEcnGKAN3vosvseJ4UcAjGL86EYCMEbTC7vC3gpR31NuBg5

At4S8X4TXK94BChiW60PaUizJb0uNud4PsBgY2nLIlvhxUCb23wj9Dbi5f8dSKGBsCQh15gw6YhC5H+a

JOrJrdhK5V3knDus1qUmFC2Gl9LDhb6wTypHtJ+TmM
7tPs0ENObsvaJtBb++if605yiAujfHXsdu/Ia72CQnCJs4PGnjbu/NddQfwZ+6+i+98JbS07QsVB5B6l

v/WMoyBHF/cB9a35pYgxmaFVOQ/N9K69DWEYNvfCO6LSNlFSoTrCE7qMh2KojSwns5ycTUaEysfNlE1u

P2mW8KGyPAMcvA0golJ2+AgYs0U4pNwohIyukZfUPU
7vz50hCuc416oNBXTwO73QZJLC6RKOxSj8OWKi8QiLdCfxsPgvOWH6xmJLbXvFqH5VvT1tvYya54uZwO

BrfxCUi4DYEMRtm5kaZPNKuu52dWgZPD2OFhfg/8lr8cTQ8XsJ1dP2syWjtMxjTngzcTCaSdpBF3EA61

4I4DqPwb9iXoWGUhfA8YjBY2BLfnL4kDTrnU/dS0rF
Qzc+GwdOSzZIjr5ZYpOUHdkWiWL9qs8chnq2qgebz1PV8+UmU2Oc1S4QrPflHbMnr6sQOPE61KV5j6y5

ztkWdnm+hzF5b3kimE1S6PqHt76rFjyMXIdmsTx63Ntdl4PPJGgKeys5zIbZCr7n2Udz0fw1uhYg+eku

lmLztWW+NGv2+x0yNn3iFZ5onLOSi4o4d4SF/x/KiQ
h7XsieNs5vmo6C1N95LTTazZgCLIcSSKNaqnBFlz2KY1B553sgvhvz6sFmrCnpFXvqy2Iyq4+7hFDsPY

yEH9BI/XOsHsvWWlpn95nkMOsVlw2iTe7eZkrZPOy+ABN2UGu9p5furSjrswBnBCK/cTYgdH601KnuMi

rBvtT8thFgfE7l7K1BMQCT6qgbkg2NjSiDvRC+LmeW
kvahDvm/UzW+rWNrFhuhaqxfPmwAG1awZ3yMg/gQokYZmbtLonJ7DpQVuoA5OhZTfe/dMP0v/ach0cJG

PVI3qpFj2Hc4qBWBVRo+o8rAZt9VC4JMSeB6sq10XvP4HEO+h0nK8OwQ5aKh7afrjf/IeP278P/RSsHT

tx68D96cA9DMOSnw85hXCrkzYMejDWQIb6P/p4S6Cl
hRYBajOSg4BJlFYfDjfprc3ek2RIGF23PChBY6RZATACFagoGwYVuYynyIRxUwpV/feyLNOxcSCrHJbh

wWAUUU6YSJT9BM0jMRejZxiRx9ejTFKpV/MlfWya2SCQN0bl4ww+wH5LJfSZUgvl7PGvDxmKkNOeKcNu

pF5o7IoSZSy39Xrnw0YOe1hl2JP7PaDuwJ8YFgFwX3
VuiNUsyVpX63ChGBH4xeLo15Ptw8AfXXhsO8fW8V7SH5M1DsZ3QacwZ4D3Ao2VO485sX6SE61Ph+p1IZ

53kBu+xQ5DCVI0OHZKTy00BJQxFTqIsfpzTvc9DS1be8egg94nEEvA2to4BkfKjf1Vo8TAvpvlro3g7Z

X0O07i1wmkRmWla/z9T22DkBVu2IVHjRYXE2puAEh4
zf770OuoajZEYhcv/uTvcGB8H6JJZBiTDlGKu3iMRRcRTlMLrU2KoWnQnrSRKxGMsuegMLcaUqbk22Rg

8EH/119XSFH+7OKr5zez4NsdpQcSQwmWjdF2c4WQcrWMPDYPz8ti7t4qxktoDUH7zmM+Qt6R4ECg3M4a

UJp1neKETF2KUltHcewX7M1vI6wbFjrz1AzKJsxC5B
fQx1Q66tkwuscnY1QcK2c7dff5C7kO99gC2P+ou8wSO4Z5hLhhAwHiocaOI23NhM2cbLDh4zu1NzfvVd

AkAbzMSWrmOrhEGKEfW+lH5jDdt0SQ4Wzz+gDjfztIJr8fxoJm9vbbDJJ69MmwfNtnLea3BVLaPwtrxH

B9bTHfRlXg+orVa511Vtr3entksinUcph73NXM5Kgl
rQwnPKZK8LlbupdVVA+r3+6guAn+cOx0iLj+Nx+9je1Vx90I2dUdIikUJxrxHuwBHJAVI0bpHQYuH476

LKOMaKhRo4nUbzM0TFWtWSIx0q0BO8yXO5y/OR4f4UwBYBBBTo5MpPD7RtSlbgp1WzkZCVYeRtM88zG+

BhSDhU/Yk2wkVTRI+AYH9jIfMnlnRRwY4go1OEsDpo
mllstlHfa2r2UwLxswgyXaOfkTI+3io+/iScIjKu4tBQWKPY2EHB9hVpImZvOYMJtu3VbzcdDobC6zsJ

eKEeQ6CgvQPNQH8fVLQk8QpexY/T+5FC8iTGSFA/5vC4hqHtWColYkWah+VNkaduu5np9v/njgGrDHIC

+JnkIxMybvrVOTyL1XVfYGnU2xzLoUdilC+2OUjUT0
TMe5HfOqOTFeEkH96g5kAajyys/E54cyUFJJWlqtgy/zUcbCRa5zditkZWyY9a7PzWIeCT/BrdnJp/edward

dmxrLz2SOdSdh9TsSTa+NvHDCzpP+OvGJgQTiQSWksAfFO6WpEyv836NmbTRUJ0j/w1u9gcMszwT9Yit

5qPvXrkVdp4tHIsfRhW2SnqS1+NDNLjGpNP6Xa6Oe5
TFHGBhTFn3qOzJW4LqlR75nkM0LA3L8CPzih+v8wBneH+Cc1K/mgrhZG7tBQJ7TTqUgZF50g8+io1Nc7

kGXNBZ/Rdk2gcghXVt4zTe9NY3M46dwRVO54V3KsHQ7o7iNZvRACVxJzo+juHaFQ2iTSX90A4KwUihSQ

Im86Ze0Sx8M5VY1T/5lL3ntNgHYcVRMmx3txOT+vk3
hXBDl9XO7MzhBdOpH/hDgwNUWoT+awDMRux/uRXPIEDiu72ZEgWhAz6LHuiLWp5epbfK+JL9vIpsszqh

5GL4mpDS06Kg2tuL+DZbx4JochpJRi60wQ6uSppPGKPUCr0/Mbp89G2/uRlC3jm5kfvWFUf+xJLyeUK2

iOKC6ush6SOj2/W0abTGmslRdhFztv7riPjPwCdUwu
DoBu8AVLepmB0yNytdK8kNwpkvkAoxax8VlDgylxwcYepao1vutZdvtPP96apkjViBPrk5MIaE8YC/5g

xTCaKT0SQMEAfW1Gy+sbufw8u+eOTGEgHPX9sbWgYWa4hXs9+BweYTkzf8C9rXAA0F3Z4LjBmtaJ2cOa

bEJepy04L28FUpCKE1Y0DNaABFwSFPgCLJsljeT4Yr
1L4j4pVWnq+4faKLZHSNRTn+5QWvFpC6J+WOUCD/GwzITzxXcSmGLSnrmykBAjE8tAQgNB0sL+2NC7Wf

wHex50evQdRkKZ7nq5XPCr2IAqxjQmDPh9YZGtmmK+v+GeInNnm0jKKs5IoUHxMdCvNmD0Cv/qT5mpe9

aJ61q8Mp/uN1sBFQ3y1He6B3WkNQRh2ESEZG3Paq/p
A04v2ET0lMyIq/CDe0x3ypExLGourRebR/l+wtbn98/zsz/Px+KJYMuZ0v2KlldUbXSazzByUHg0upb9

B1IoJKvPvpyr85gwaHtwP8/upsB3el/ucsWv3t0aPJ/4JwwT+lCaH5XUB/M5WHmvX/iWzd3tHQsyDU8r

RT3nRL5NDDvikcXTGYDpe9o3HC344ZuEmpY48O1bUy
vcDyRO6S5pPw+Gi0b2pTNi03u1aQzDw/n+JClMqMOLPo+u7gcSKNR+CcJL0WZWNtdVgqGQvb1N3MGmh1

qY5EykmEoq2bIJPrOLAU2sXz6Oz7BJ0DRgRgqMpKn8WVLuFypos7hAbWRKMx1cvCcZkJhdtRSP/oUuGR

C38emM3DznLB+FADy2h6Tjt6sq1dcB/SSUDPrFRajH
H5GF3SPmKR9ii/vTE32TCc0TQwQxFZ2+RM8fj2X9rnSndt9OzwASy9kLRUU3dGCLU1Zg1bTW80sAfKQ7

90LVaQxmkilSWjJpMBP2ipsdomva3Kb7P4metcP+az02tipA/fLpLFuwtnH0mIbtE6idA2e059HNn3kC

fnLRL13OL6vkuCvxQoGR8ritGmKAhvYnXKUQE686E5
z1fHM1dXZtdMZjxe4xFQn3wAt0TEPyLLLSJTuzh1Vp36cU27sldgZ3iK4oekz8qlzhQTDH/2LIi8rNbS

1HRu9TMOlR2DYtQ0TUcVbh9/32/G19jJfhiu4/wYnaAcsL82oovR/YhtRl8q220Jv/4TrDludyOSyQiu

FIimYAikcPEHaPxp2pfuHFVsJ+7T42IkP/XfBatW4i
zIlW/WcalI3Uvx9wYlRZLvOrJnIsOHH0A1LelBoc5Eqnr7xs3ZLVeXjqTj+bp+kGQ1vSUIMq6C/f4VRb

SD3mzFAOIs5A+BnfxY00IpFelyODzA9ztgjXIvtYUjxNw6HEjRlrxLPDL26wTW8QsIb1SNXFF44H7HF5

2hriqTXkjBlcNWEFLBmUhWvJTJ4j6WM1LO+9idOOFU
EYrgayzmMuDvnJbYZVpAHPyk6zTUN/GIi8K1fwvxee/yshPpkkB21eKbzKAUWmrobFKFiFECZDEjm9Fw

GRH6L9xYX+3IdDAFDyy+rQZBGNnLg4sPwzfHTz90SZt+Phvya5dIjCB2d6JgsYV3aDvG08GNJ5KOaJd6

tV2LXExECjybl3Qj2LZH70oSVgKXk+7pQ6sUF9xZ4X
eE8iAi06lRzXGtupBgpKfXoKx58tnj0kGUzPiNFmF5cn9Xd/2XnKBSIvbfHTOjde7wLLGQf5/yYeBRTH

DDr4xIceJax/tkqtp1VArWHzX22DngN1yewOEOdU4xp+k0DGdMw7l/CVo5Ai9CPr6WOt8fD2nLAc458v

eTwbmKI//fCvf/uTcpi1yV8HftQEU4a1I2xpbtc0Hu
2SO02GZYixRZyUvRVXEzhRxEm2s2L2iAc1nCjrMLGEdRHZt210irYnisEPcKVy1VRzKL+CQpRZUjBMPS

l/1xkBHEAEJWEtEHmyddaqi14oxP04ZNOKGsglhVaCkqDms3OG+SNowQNb+lV85EwFi3y4VEewxTBreG

ScvE19fPMKflVhqK6gBs0kfpxW63SVdNgED6VrSIL2
smHdk5ZLKsUq2otTdRv/gJh4dXHOtUV7jiXLqWT6K3CbPn/DmI/BwHd7IqF1n0G99nkzTrez6uh3b7dw

aENfRh85nwU3+WbKCYYsM3HQk6Mp6JExuwpQGS8fZOc+B4YT+REZ0DuYJ/brtdD4osnQT000S5CN6KUW

JTz4Vz6yUja+Fxdjb2OsfzL68YHAg8T/vBKgGj09zi
oXeB7LeYmmTfnrr4Udx+IrRn/OPuNPr1eIEhicsBHDIMNo0ooQ2awhHSTlXWJ74ysjaJ6WpWLBRTMgvS

sGS4re9OxzuNW9Ki4yM+TdlSTj9F7E9qmBfhBMgPrln1bEHxkHT2XJCT3VeUaUxT7IijRMSoQJ1r6KSM

BF6sGW6LB9oS2oLy+nKOw2irP/7+BfboLTOF6VJDDR
0a6pqhTceBFO7pgOeJdKnQ0Ky509itTbAEvo6XMGKo/Op/LHA0WzhpPoLePZjm+5p9Ts6KXpaNcwFUI/

qFximspaOWCVBaFvJo+NVKewu1q2JgqAKXrq6ah1YRgoIYiC0UGlkwgIUVMTtssGmwIvCEdHUz3LvLb7

tDnVfNRsCXWPWi/1X+5OzkrQA7gwrum+Q+RXl7moKz
My607MwDeBiXg2PJoECxy6aQkR4UjTs1F8RD3gZni0PJoRfUcMuHqLXQv4inXTK1lxnJkgFNDWQBa45O

RKar8Yn5UhpuZlxeGXq4K8FX4mBVRf+yG/uxof7mJSgbJ6/pAVTyF0eS1xZ1bNKM+xiu1zG7WJO3r66a

B/nclva6jRLLOrBezCEcUsXy0CnwfMqMZZnS3htYiG
jBKRfkk0XjgL9kCa7GBK+CBD5i4CjTkDIWmTXrteW1RixQMZlvOVIhT3Tqw3pN02tqX71uLURpVN21h7

n0buF9EYQg4giCEW3xrkzv0wWRULedKO14RLi/4WhtbZx0ttCxQTogjE0VrwH/7otqHGfFyCz9j5r0Gw

8/pzus+4dr+e0vNL+RUJrCfz/+LxqI/08m+CInY6es
vmaDDgla3tlCJUHjboJ7FigdkTXMHp/G8aKtZj91xSZhYriyh0u7t/UhEWuxke6YR5ZQUNaH2AZNwGe1

08HMLjVq5EkPUv+BwPbf9zMMZRa/QkJlleM+wYijv3BWf1tTMhMPWE7DUdNteZ4A1eSqXgCi2e3UkV9S

fapU6Y64gbMFReClSqVlX7sHig9PJnxt9zmzRow6Yf
x/HhVqlM8kKjJ06+oR60b16xVcU1sxF9r/xITVxt6GBa8riOve7/mn1JWL0rudNy57iBEShK08efjBiR

Os6wyUg6oY3bPukQweVNolS5upLrMDkhPeJteIYfIpVQBrJw1Ne2JUcsaJbWvzvjHu4iSha6N+HklwFx

tY3p/ojMUcVCe3CeQJf86p3C0CDd5JaRRQi4wEG0ex
D+w8coGzsWtyIRhFMJk7K6+iIG1pUGyisZOh0qJRF5FpHLds/fQcSZwL4h6TLiJHczfwqrytMoW88drI

C2U37t7koSvk3deRVIKR2zYOPRIPmyKcFSSO1iAqK2xiUzdDL0749Dl/i99D43q3EmK+H2MbBbydMl/4

aMhHXCFos/MyTD1OX37RKo2rNWwKWis5sIYf5hk1et
tQtYdvktiVe2bmX25i/rtbX7xkeMMA7iwrFWi9TkFkfJO9t+8UHolVu5H8Es2GLk1t1PkzLIjivjQTVU

Q2WSue9+Pv9GTuRjM7zEPxeJfFzfkvTSneStAVSefpolagzImd74vqToYU90u5FEzmgsJQeYvvvldvIg

WrQJDsRZKsIVEgUdvvXq7v4NszRGrXKcnkAYlDkHSa
PtzDCucIoG8IhM4TsBwVElmCqzJKSS51kkfyJ759LEIlUhzh1lmHa/J7o1h7XZrYTvqFGDl5rCiOERjz

b4F4vnQdTFHHml6p84FPxgykZPDl2VfZR97/LGVyNy3orUcKza+fx6shtv2HMJAbq5yhOO0HcAJxUDx2

ocQ1q7XnqCKMXcMQT/2pgcWuAjjbHiuzZlXIWVPcC9
g0SrJSb6nqJKOVMZCZ+v/gfoR/qxydpoTxiKKlS3uqi4sObww8xL5MMRaJhynEQ+MX+9Aqi/K35SRS7X

a1hcqxmFCeDHr3vzP1ywlDptOyG5WHMv3YNIw/uc0WQX4i+WAXS+vMsD8LvNvCethW4BcJgSkaDzMYQs

O+zA58Mb6ZNftN0JCXMpQQyftBOOlwhBfFz2wqtya/
v7M9eLEHlwhbA8lenHXn7a7oWYKDOacwvZu6zxl2WVHJcoi32ufjruWh8g+b0DUzzVqRAIA3MirCUcwL

xdXHOnqTU0LoROFzXsY47kTCyz7ETd895vzF+ufloIVykwsNaamNusOlra5VvHRE0kLNqc87k+2v6HcW

rFZz7yl3mqqAUeclTEiRVokW5bzg3Zkl8TLCkLd9Ky
zvipjg391FH90HcftmPKms8kosRCX91e7o6GGMc1FlQe/OwXtar6/7mE/7vravqXewCG93CnG+lbmaR8

UX81752fHvU0AnDCIb/alvDELmcEGiWBASrrO9ntrIT8dG9+8SNvOLyPSTOJSZ81PcKOAkTeM8ammeJt

xhqvHoUKAK2xuzmCJEIHIpVQkAr619n0fRPZV0funo
H0VQ2JTxFxaK/Nq0eMv4Ef2MALulHAKChn7YiyMEheMgdCXo8FrXTdQEpPPHfFoy2AKhYNA8iFT89psS

no/c2XvXcVgobZlaMG9bupBbAifMW7A+8t/m994T7C8sNeDgPLvmES//gC6IdwCjPlFglEvVjfCTJmE9

ZXzXTF7lPi+nMc6uDMpwhSD+KFqWGABY2Ak06+PJS6
hvMHvv5hTtg7X5p0Uv5MfbhDf4Q9flWr1da+8MOB/jGAiP4m7YzK2SlENqTAMLTLoqTFc9HyYgY+dIEj

gZlx5JEJeEDB1zpO+QIO+RkJGRqSOTZr8tiFXQYq075VGX3NXAghTD7T6Yfz6r+lz0k2SX7Nv6CBTSPG

m9SJmL/h6FWYXWu9A5nQC09JrpH8GdVAysxapJbmO6
He44ibyA0jdnk/Dhw3YlDiaGL4BkvkWQtAMMiLhJ+X9y4pwm25Wte8AvLDI7A7J53vxaeyGD/DAJhfcG

aTIP3blONKNU2GMdZu9cFWz7VdybtJJI9jdfnxWLnnljT+NxWbEg0b3HFYcpZ4WRdatGspyKkeQeob4L

sacPV9hWtpN4eChC8Fl7fKLtQS/UC6Mio+BFMt0JRx
TtO38jOBqCAIn8KBzUX/aqSGvkbY/O4/YR3kbJPjIdOZhl4smYL/y0hGXDr37zbO+gnAADz8IW2we4DE

uA2D0yyPimjGzyJbyW3Mh6AB5B+tTJ7C4Ye6DQ1DDPd5J4uQCSzVuxnpwUQYspUtOZsp17CaH/lp1rWd

9hlLe0tu8EahRSUbDlw3BUBLlkacklF2516Hkeb696
KK4VVGcrHxmF8JlPgAJyEowtUqitq/GLuVlZaYcGMI4tUcwf625YqMw7Pd+6X8kKXeS66m2kvndnsnru

9PfeJcxnhvVKp9H8TSxCku9fLlmMD+qeojwqPTRgbUfLuoPfnOP7zBD7ec0LsprUANrqWhFl5qlnQMxy

4qGpdUOlVZL39u2ngyGzyzz5R1/NPPd5dCejOZrn9b
U5RyYbcKYzPFCuBbVJNKlnf8UVWX3CEl4lNO7o8nAM/DXlecgvvbP7NIe5nhiWHOamEypTT/cW8FGlb/

eqm0pXikoOxIX3qcd7emGWDS+VLAkZ4WRWwoBpPr5Fzo3CHfQbXDk5Z0CHRyY/ZhZ8BJZY2RADEYyqcY

UQBZ/AjfiNm6bI/F6H1GCCvCNH93v7f5FhShTmwvnA
axM4Fbkdush82rgvxQnESJUCyRWaAKnfTJ/zeqOZAcGoC9ezR+3L7GnnUKjmNvmINL6brj+HSbgcf3aQ

1Ycm7BnnzfstNqG0159C+HoN1yU21aOTHtqrirXN4b2bihlOV2cQhr/E4Gu/A0oYlWaZcrGEPf3c16YS

S4s3wRwUKqQk3r1vlLtoL10Ge7rT9lbjEusz2f9uoV
Vw1RWoC8YTKurYsNwyyDZnjL2VwP8BhgJcYeuzkWTiXYhNfIKP1Z4/7aosxi9jtzUq62GEa/pYfxZn5n

WLQQuHerQsqfFkSS1XMaOY973kezHTvu9IKbKIv2CpAiyPbidPkZhBZnrG2w7mh+w2xRaYrvbPvr8fZW

sZn34CmT0jB69gmbbdnAHldIcTGGbLRTKB02Yn8bNw
BHzU45WaNr+s4N836yailg58auUPzwqeOiUOjgFdsNC99yiBCZkXvv7ymdxuJb/4DtoILpLeSgMaaEbD

lTfOGxxO8k+phWa3kDYyIFh1l1cehyoULV8Mv5jBU2TJ5x27nhfGOFzLxzjM1tULCgcMGCmXPZ1Wgf6I

rt1WPaE5uJ2aYcKywLDYBfBzoR6DcrNrYjlcoU3Sae
9fTsxF3nKR01wkrDhLS/3EiapwRgo9OzI0EfMsb8IaTRulG0UXeuZBWfiqIcNq78htuxXwQmZHt5LgQp

GtPhQNe5crUgUtJ+o/l79SW3wRolY+dTPmPPyn15PNwCvrJuso9pat9h+b3lMxoei5i/BVhmL11jVRk7

GBc+pqth666dOMh1ot2odwsM3gtwxU8CWaPMbyonlR
vELshsB70BG5IbeoJXlsOa0yGDe6vzVmkhiBNJnmymyaZ+iNbxXnaGrKfs97whZ/+xA4VXpeMj3hqKn9

6m466c9xCpCJ0jSLuta1IkNtIAAWvm4mobpTz7Ydom1BA6L0G0CYlqbV8n0hw0Izh88ElkYs47oDPDyT

hp8q/LTSK7ro+IKq20x2EULqcdFx/4IyofrisnlJ/N
fYhZlZYDHn9o21bmqZy9gVAJ7n/HR2L7b66d4fj7cGI8yjLn9EVXfuHobj790A0T/Rk3uQiCjdWr1qP3

VKITDap7NlZXZinh6SFkXiqfdSb24LdIBt/a7BK69y2Z2IfF2WS5edgftC/Jbs3d7psz9EY0L8Do/MBh

Di9u2EZV67IrG/3C/za74nACWgnWKBjPyye/g1BJzH
2SChdoZj9/3FD7vra8hi6mXabnAiBISz7VyWO/fa/lXWxRYhS9pPsUb+YP0asH/nKzlFXIMoztxvU46j

jZ2H/4WEQlQ5Kb7wYfUcimO7IMh0IiMtuVSghSk9Hgt0uqJeNe0lhrZbTE5ZRox4igd4j4uxQBcK7C9y

8Scxd0/yB0potKvEFm2JN4gqSQ3NihGt+Z7fE/QFFn
2nFHII5OZpM7w28aerK6GvLg87RjQrAejFC0iC10mCGYoFwLgl6f5LfohyoAn080Aavi+37jEi41dRwY

/5h2qXh3J+4IxC33sS3ijJVliy93+FfITanT1ii1XEL6hMJzUHtbNquGWZ9dgmoJ8J7dJTtzl1+1aO8W

pD7gNCgjHe1Q5n7T+nqZIID6JwNO5NCED7966Hsp4J
tq1+xIA1NeEvNeeSQitAzuT4dk0AUDh/9Lr+r1ruMoXj+Atp+yfaBe31VcXGeyuYX++Wt/2qwtWvV0L+

itrDxF3AG+SRxP/u9Q8l/etISKeh0Q6Q4O7vVL8BeI4vC9EgYyQa6d+osDIltZIBd45Rdi0ypMkvJhXk

/cQL1WGR70nYMJnliShA5oRwSW/R2Lfl/jYfE2y+2b
20k+ZqYCQrm9pi0v1w3o84BDfTj2nI8lzcNwI9/0GS4l1xess9B6zDLwViIEumG9odHkSFLrFjx/sNtb

zscpio764fBovYEyx15QIeny/EoGswItSH8Dx54vZ/ch+ZUGMQktOjQiz5kuxmBajEXHk+Fdv+ne8gbk

Sg7uPEGW92gjSGocrwrU/23K2gn9f5z3jmeQtXABM5
oKIZusr6k55Pc3Rai9zb1G+U/KF+4riNN1dJae0gi6FCc96stqK/chPKm7pqvW0QHQFHKr5zty0V1aI6

xD8YlRP4dhVb8yKwK4VLFuYI08TcuL7uHPXLd35WBmOh5nFy38bargiuM6rkRqwdS8e08gC5Xv8GmeoV

p9Voj883rc7sNRz+N0IT7QEqbDl1l3Wh6y6X7pomvL
CuFSPP2TkAMMED6r+Kq8ZH4HpJEisNwdlo2wew5F0m0f1iCybFMhlCmE8ExHU72cv+8pSyCTz62b+izK

pj51G/cehNrpD/4mzdEwkHzxv/w5d0usxN/H2xsMgvxFRUplB1zP6MzxXaB7Rsk/iBFAlvFN3oR49T0k

hPBEZ/SoyXI6Ygx0xww16lBlS/ew62MWv2LwObEdYM
+0KFIDeegekf+KewN/7Hh15kufjju8x9Glso6rZnky1Px17H2P2h9+TTEZCDANy//MWrkOAWtf4Cytr/

RNhTt7Sf6WvWAD35/DgOG8Td4hL+ExMR/jHDL81+ICvp9xbbYzJXlDi9Qv6/U3b/18JW5lI/JcRtb+D3

bxSblJJ3d4qBzIt6LuW67oNXBcyBD0AHyJkfZqoVDu
9DRiR90Kc6RAOQ4rudVpRj+8DUxtl/F8de6wSPUhT9/f9Qoe+5yww/M/fiV+ae/5G5gX/L3B8A+ymW7P

WQVx7qPeQ3D5hllhxjLhnN/wKH/wmsJr8NgmLi9qXMOQ+kNVfTh6iNWFYyua71M8e3ykbVFfUgKN395Z

GVZrW/5kb/b8AZIiNpj87izn0lKqgGW/jU3Y1vYIJg
5Y9yOGoGr5Pt3dkV/vheoc5KwFq//hZpos6Af7rbsLk0uGFG4D0h3cNb+N+sudZw4TNa0yrdVrv7O7vD

/lMb+TivCjBufLApCk3Gk2egn/SJpleylvSsyG6QJDB92S6S2/iPl98IBsEjiWcxGlDZBs8T5SwCiJTl

q64fTN/8808B79sOMQEy2L/3Nv6Qlubdn5jeAop70S
KvqUSnfBwTx58s40QN1HZXPG7UdBfu+bNWqMxwIrx3nu/kBce8mL2KP7rw9H/EVFArzpOmuanix0g89L

weuVEQ83NB7/khBES9yoWFs02mkoSRfTSToTc4DD77lZbcKYss55yk+RilkX7xQYm73cDvAHOY1EHXsG

VKgwIHDed6oBOMHey06wCYztU1nGeptqToMf3QW/v8
1tcpMXy85kMOxsRrL1dIqKxNkrG6yJLXXsoPgHIDcRe/ARNHy1dNa7lDkV6bWciu0dZT0YBprn+OJY20

Ogd3KCmIC5YMzl4FBlSN7ITPOs9FtFtEwL6hBx78f1tsFoF1CUmDVg+u2GpQwOLBgz7LMo4ywgztQuLy

pnGy1G7VmNg97GM+I+BTLmlLvGygEZLP6TL5cXSZ+a
U0koAt36/WZNCvSgCpAbpnOA4x1HqxJVDrP6rN0qjkBX863weP/eecEBS/D0GndkO618EHZ3tbePAc8Y

aoTE1dVTRezjGkI7Zlu1eumQu2ga2mzl5VUl9Bs31nh40CM68dla0gla7Ld6lhhRJWnDLzdJx4Gr19c+

lYnSGFhvY1zZ7i6Qy6zM1T2jR+FfSvLefbmr67lSCa
wg5mreRexXB43ywIFadpsoarDWKu2e7t683kdP5uwpz/GzKL1YkdjbwLmD60/hTybVipk/slC0asGzFM

VwL+bTnhrBdIQILXmvglT684XrJk9eugXlEs0+Bif1cVSrkRhkTONq7R+sZJXtER1dUTOaRFV1LXfM/v

Kyn+v76pUNMgwof4scvIvMvgCLxc+TRj2CZeSFSji6
NiPovi4OUq3vGU1D3b5htihaHXbRoqhppA1+MLd7Hc6OdaveqlYEq16ae1ptV1uXpDWl8d9b8Nldabh8

z8xEB+6ySS8Dh+9PTyTfzJlKAtg14OI4/P0mwMZCWr+R2uHvThoZzao1djsuyanrCfhoLW8URUwIBouv

eb5HIO+10US1y6v3tL2EJH1tYZeLbQOFVr7NQt8D5L
rhXE4poEbaWTeLnh3rDA95y6HsZKJ8xGR8/6kkESH/3ton3+W6T6+NZC4eACMxbolk7hc/rcJcXODDVS

yIDUcZMhCxWh66V2o/ZQNg4HfyztCK3ZNaiO95cKTZxwAYRK79W6E4mGYpKYIOSRAvlt3IXZuGSRKh59

oo45u0yt/HU5MEzhyjKjehiEnAVvYDvNPIWZUq9v8T
3VJRVwLF52UNVvTyf25PRckY+Em/ZMU+mZGyzi6NRO8a4Vs5Mii9kDKDv6POyfWz4HNEBqIUl/loHNlN

2CXa0iyVPp5JW1bsHGQGZ5jRvqRG0Ee4fOFSRLsOdd2H+Pz4m+xG0lDSsi8Kw2zSvDaBuQPKXZ+1N+ux

tvMxboR0JhhIiIT0ptYcqjgjxHdI3E0F5CgqiC823H
LKbJhUBuMsdkM+GABBY/bXDNJM4kfsOjgKo2DkolYgX3TLOCm/SMPBajI8N9vaIq1mbd4IIYTaWcrenBNN

HC4i6vO6LkTcbEiIjEmyb5nuTFxWcOvs2VYozCM4YUEoGHxJjF77T51kjNynw/4ZIcZm/e8Ojlax/VlS

zjWnK6IGCuGiGRH163+HWRleAlmXKfuwNLnSgrsGkF
ld+Lr6ExZjHo1+0o60+4LzzteAoB+vRQtnaiH+sRL/6l2v2J5EpiiD3BSBamVm/U9yYq59SrJI4OyzNc

/1oRMU7XmUI2+zuFsvz5MjeIik+ExIjGnHpTn9u15arWPP73EoMD/oRNkT1fGdDy6Jon+Pg7B83qOhkt

fYPMiTCT9oaU7G8CNhTptR4VwP0SxMgvEvueeH5VH3
4MNes8fy5EWAfqgnMxwmuxyPQ1P1LRd6T7P3L8ez9Zm7pkoxNlcrqeoIvDQci7ewZRGRe+bATCSNuFKp

paXgKZCu/eKIiwl+ai+SwhyaCmYvkuFmPyCqpb1MLUHot4l71CkQ/Om0wiI//U7gOFywqMfuvM/24A05

FMp+hlZ07a4qnBthihW1m71DMJzzCSKhEzFIc6osFP
EXzeGsc3ykBz/bl4YsWukOeSfT1mXG8Fa1xbYtUWsFk6tjgW2SVJk29Xn41pbbc7e1/wRcn/ODVDn5ip

RaZ06bU8MPqZE5MAmKUcvRIhGTiyRSj55D9SZGoKe1unXiiQQxCcrT0G6wb+SAnWQ2uLXqzi9w/c/G5x

kkz015SSQQTegKjUJ1xzQxT6kqGg6ANTaoIdbiqDwI
zSZDq0903OZUKMxt1W+swPKU5Z4V2i+EGE/Rfv37S8OP0KQRicm7TGowQKII50RPRRUsxYY+/RZmcveo

OXgMorxHWtrtav4brx4cqTvxRe8his40DGx6g0Mx4/u8+0PwCPb2v+JlIxmeR3taoWfpk/m4YrPqKaMt

Q9h9j4HD5AuCvx857aYlf7qgfyMhL9jxJA+sqeSfTz
8egRhsJE1BH8FCZgyDgzePTn8yVMtIZiNSzibsoEQJJlesMu3zXqeXuqenLSo+sDaqAKYS8W+ZqiELbI

E8HeqjRlejyIW5QLBQVIsHoUQRB7x7mpQrsoDCwq/5TFnEWy2jUndUiIzXEgc2OhJM4fJoVjMmkYWjlS

RTLalMvCBmsV2/IBXcyYZ8uEEtjWR9Qwb+vPB0hVU2
xVLq3geLX2BHmj3tfZKtk4l+GnwX016hmnUBZBvA/zApdR06/4ShIfH6YqOGqVwRMvv49MtHXpy4E9HG

2untDGsKffMPgdmsPxTlFWr0o/PcrYr1XTFQ1ljzQbrunQEeNcPmUwo5gfzD9z8qaY/hz/axtBmoU99C

ty/MYcKop1Zs1vUedvfGrXKjj2Bx8XLB5gxYaGAWaC
noYmpyDJu4hoFlW8NiYILwdwlxEQw/WAdjXw62bpE3x3nHr1ngofaDMdixrS41yDqFJB0y69QO8PTIPk

ZM4zgMCIumR1YfHH8Krlj4w2oXdERyI6dna99zF0LWkHtbVyK4Ej305C2lbvgC1TUFo++JH+Bvp17FFi

LhWJe4LvkEI58q5qlkbAK2OuhnhcimeHIat9GMMNx9
DzfrhZWlaTrZu8sjsvve0L9gxSXveJGTON9agzWRZVS9zRteLSsMC2KU9Zdu8pqMx2GKVUYiupojyhaL

UhDMCYKH7KPzIGiuuvlmyGQ5vY+dPyRCNBy5gwfLc2+/jKRO6EQSgOg81MtND5wgb1kxPnmah425qwsZ

Wv8p5821MOuOgzExXkxxr+SJyIG4AraziGAIXWxC2i
StJODiL/uHe/4hwkxwq1vx/4ah5aBLaMYKJCQd4zDOThrgJFF7ndWK3Cowc4ZGNPZPHdZ7BYAxnsFeeb

fESkPiQafl6MdtMSxD/QaEAWw4i538ojStXBalzTX2qtmg1YctzaY8g7s60oNyVEfkavB8yaLUBN19z+

V2kxFfm/vOwlZT2kU+F/WZ45b51fM19Q4q8Ft0rYd1
e09q3Vgp6yaWVnc/f4nc0xQvdDrqWA5yJZPff+j2mGo80du4qwjr9gbmqWV1yRBEK/1hFoXDPd1bLDt0

djiO/hy6UZ0AgmPE/1xQkpZNHssXLIOhLDdwzaZ6wG6/G6cW/l3gX2B687bbsnnjXn0w8X58UY1Ol51j

cmenz6gEoLkJuB0LQcFmtxzRbXtPEbqTBQLJ3EPNM8
SbirQHwtFhedsmCSRV3HJ1vLcrVmD6t5Cxr2NIgQqT6twGYwrabKtxYJ5BToJL219GHNLKLde8ND5fiE

eUc8wLvzPhpZ5h59X+qHBspqwj8RaMQ86VLfli14t5Rf5H8pE06hrAQGnPoeZg2p4B10blhOzggZ4RST

UG/TKDkBkZ2IVajPMR400N4nbEVAP9XgWuDw9d0AwI
j97RQtLM9OyOGoUfnyYwIrvvsCQXnKKJyhM8zEUgPHpLpQgnLSPxDEoOaNEFfoOsUFFJkHrwTc9Y8XmX

5CnDvKqXZQMF+EshJPlXQVY7PfpL0lTLQoCV0o2wgVSJmQixNTVkJmSDcdT5J22WLobrqNFQ3MKSogjO

pSTLn66YRh/hlklBTNWDU99pAXKtgbRZ/7IDxqP/+I
rmnrIcld35P7DHVno7OMEFjwqVffNel3Ms7Svi+89I2JvC0WgFqlwH+8iptHcuIAQ9oxZb6c0bXIAvH3

ZC2FyFbU2jqoMULXgLHO3aSQ9S+naXzilZ0mE16TczlZvcGscCZR2aO/5M4bTgDaIymQ99yhAR4g68UQ

DDv1khMkU2cyKPrO5Vvy/y7pGVrljSJN0hi5aBNjEZ
cdfbieG2pR+ObusAv5Fi2ba7IH75lDPB+qA94aHRTg1FQvGNei3NEUO0dpQkCHTRPlgKsTeforCKkgpZ

M+vl4uY+XW65fOB6LN9jNt5b4/zAJ/6gfchwbagv5ECMusg/k97tDYeo4DXcTqkXoa/am7xXnof1q0fX

y14d6hEkJ7t5GqmE0jGNeRPRqmK6lJaUKtfxKQfrmv
6xFsDpfp7HXQ0ijxZItfcEAZ0gl5BGyzEVeJMuBKs34Cij7US9DvJCc78VnXd+KkGQrDWw8BxZuTDWBl

bqHbxii52qmH0fZNd9iLKRxJGBkJ/0XDttrIA4vigpub+gDigycz58v7cYXQim4AU3wh2rVB08ggEaZ2

66wkvulnicJcWHR7s1VTh0ubBElmdqqCkJMQ6XgGFC
TURYjHtHypmy/ANmJUDFftHSMWYewVuhMqvw1K0pjnrzbkGBJG58vv3tsI0dX9kFYI1y8szMUAfe88Dj

rAfXANbDgpoJUX+cqmWny8mbYumq1+npVrhkn/6IfG8ELA2tSNdhwNGuiAGhP2uNsbsc17KyHi+mfod9

T/oKQOn3XPjC5+nCbDH1AXq+3K8Q3hyzk/0Fyz5Tvm
bnuH8IdLSeJOqRbwWzlSNbkvKUI5/bLBqJ6CV1zwlYUX/hJfvJztWiKCMuDyBMz4bI+13xQME4LHzkSA

WBEfBNxzlqCli4+i9HPXiH5t/2O5/5bjA1XhEjF/pkli5DOu4Otj7VwCBRZVT/sdVX5IcW+ekC91WwTe

HLbvLMnVWf7VYg/ayK0FKhqWS+DL1JPrOZgVOczkvj
NZYUIc6cRvu3Ta0NcejGGD6WrKsfefJSxL7udi6ix4j0U1U/VGYfk2ainVapegqJdi+Tbr7XoDmXMMxr

D7itsTJEVFV0KWnPGfK8DDwjqbnMGmFPJV4ldTesPdnRUkV0zOv98AC33vLaybeppvO6uzkftCB4olvX

n+3nRupcpEVbdd5Q09Yyz5D3DqhJAKCJ0mSJOOzY0S
Zsh3eV0x1Rm6Upbt6D87dfp9/ENKRMknIFrDzV2T2Ki36ex49l89JiFs5Q3TwBCAlpkWxFvDti5RYjTk

0/nynXA/VZrGZ5ySWScp5i1SZR2M5RDesBCluQZiJmaQo1UbtotSdnLM2ElqzbB8vOar2ipuvw74VZdM

e9mJfXIWyCxACsFIKQpbQ6h8bVUIUgkgoI+/mWo1tY
f1SYG5xLQEM+2KSoGLZHJ9/lNKpJ4qL0PohmRXh4NYzDvpxGQ7M8axGt47fxjmsB6z8AnzKPt4k8vsl9

nOnBCbBAC1JbRVLsLg0PVdV5VCBurzDb+QFbYa1HZA/qKE6cvAReQvGDXhiel/BgluhFDYW4xUPuyc2Z

FhvZeXoDnBi+nF1TIPmXAZegwuz7/7JZFZPZt7nwf6
muDkaMwOaNRE/4tqascgkrz56sCwB8m/ijGF9Ll+qhRPHTbhMpTcyTIDOOG69fOJ5LP1hyk+/nG+Adl9

E/MU081L5lvafqL0EIxJ1667SibLSmnoKDEGq6BccwEqGYTnvZwQXo6GRODZPwDT2YZmt8iY3zTpP8oE

B53AqCJ8Xrq7BWiYugjYJy2FETMI6XLctomfmG34s6
PzOffXQ15Xz1fer86xhvIaY6Lobfea8TGw0Ek2cyARzPjIpqZFv7IXafBRAsU89hfjqs0IYOrHhL/xAm

m1ipDHUQIVZ9FLSrJaJSHMRkeWz+G/xWqXgvrcMNAFeXqHQkSk4k3twHY9oj7u0TIch9xG0URc7ngVx3

zNPpjN63/dw+XORgFp2mA1XLKGkZtEeG9ksLSR
ZZPccguljEYbid9FSsJpgeRozfYUiph0xJStC47D+LHT9pcvVK9rnFx5rRp1+W4p+15/RPi4qLfyydxK

z/BUWVKJCSVewaQBm/tL9yhRaMetYKKlRmA+Gl0FVH78HMoEyQQIPkCcpa5OLkzFE5t4WJiCUiz0mV0E

zSHx+mmOBYu1FcmBvMptHOygixJnLj8ZMbr7ATu3IJ
zu8T3PWQXzi0XTMOe4Dp2s2gpY0R6SKIsmnN/X7TWEum/Oea5bdB9LUhFbp+r80UU+SWdj31FIgselMD

uywGPnlYHwvXr4SXbta1P83Iy49Pe7PePIwFERmOfdpxzCOv5/kK6a4xgLpdg/MLtHaL6FBe8cZmo633

fMThLRUr9PVu8UAPboBdge2F1VPO7TjVkLe03E5oTj
YGcQ7PIWfaFNiKiANvznpd/2O4zcAdgms9g/CoMLGh6i8tobGY9K8ceL2BlHIRuJJDlbOsxTaXQQHyve

uBUooWbYuWQtt/8zpvdv9b1sthpk647i2lwbi/JVfGGLnH/TzP/XOGPmRdZhaota7N4pTyu9Q3TzgA+q

esLY15saaH+HjKakTFu/0cInRpf6E5S/8Idz/+/j8X
6p20EsAK9tmS5U6BL0F0UFEDjNxtl3M5aqxk00uj7Yk+Cuo98E4D+IkKzRhtZuWhwkz6OdmJmk+Fb1re

IbbjnM8roeT7nwKi5JzEopavQcrEVsOGdXHMhZyhjNw5xYiMDP453c+wZsuDyYfJ8GsTd0VVBTCpnFM/

0RpPztiIgU620EfSavSWaE3yak5DXswz7HFNTjODTh
N91JZAqsuu/YWPmqVC5zqJd5rEJ/h9mMh4VmuSbYtMw7BcwJXJbxYI+2xR6T8XEvUggNjEs9LBLiwVU3

855lL6uCue9zPIu3tolBfzSJSCvNugU7OpwU12PRAMQltl79RbRzHTA8bDOAgTQz3KS4Ctk5Xp3bqgcZ

vJFAEjRy9l03iLYjAeRDEf7ZtLgTN/whvGukHL339a
a4HDtZfqN0XQSREsQ5NTeyypFlIgbiShQ2eF/cN6IwuFpXKuOFwuA/Mz3KKvPolUOb5+/oC4lE5/lNeX

3KaXV9q8fHTxOTp4NLFyZa38xiXnkvYip981DKP0hDRrI1ykDlGyqd5hIJtHVREwG0grutGOF3BzT9vW

6JI1ICIdeAUDF8bNyYcatyAntDutsLlymSYH4X5ggm
5HgPqW61xCEshOufhr9RzpD1YPpXYiZbPZul/jT05rKtmnrEXpxC5LOI+ccSC5E4O+kzsVaVJdvuU/Cf

CQH5snb/n1lbKnbA/147GOmPDaFdeh926cHncDQeeEkCV2k4NVjJHgXQ7SDAd/HybAaw1So2/JC9QMln

b+glNeF+uBdmdvKgu5GE/GkOC5fe4B/zIuYP5pZQX/
8bB4Q8mO/c6bj7003f/fLWJ6jTHUmWTFxsN3afECpDr9yuIeu9TuvXO1D/ZC4E06/zXSYXJU9CCk8LGi

RyjZ2iDesHWSAeJTAYWeuA1zbzwRiwlow+b36H9do+428n5wD8TR9iCog0z2TkCBmJqeoh7pqM4B+M5e

8e/DBJqvGZqGFMjjJ/SCdnOP/CmNRzewyFf03iwAA1
xtWSwbnh4nV2ednRWRAln3K1pKIcY/8jwh8CDjIl5jruNxUZEw5OOEnrNVZ1BxE8w4SBFfX+pTTYReM/

BgsJ0ZegUr/axqVG8KXDlHRjEiJUJZf2TiFnX2Sx6TIm39GcyUrd3iLwE4N7VvU9KGfg9GBe3ZelZlfp

Gj8i2oAhULbruebjod8QeRy8uMh4NNGCLlslGMZyr6
Pj1MuNG8VLnJ7M0hPwturN0Wa1b/APZrElSWrqqWztwZA/gRNLF153rlylVrLuTqpatBhmMK5P1ZfegG

i1GC/TTsjsq2j3B2ElrTEDbQ+Pv9PHY87ftK0fP0MOe5vmiaUYA6TRnh95H7vo8duNwa8oOTJUVbIj/U

33O4k08Y40E3EVUJhn5QLzXlztcgX3rx5k3lX9A5wU
RLbKBQTkg3sfv3qxmEhX4xL3o1xIuX2TRQYV5vC2OchJ/1kLXrf0GjlacLmazZPRG3Iooy53UGqwUS2+

klarKEsJXZSoKvZ/SgkKMbNbBQkJJ0jBLjn1xzbH0UlvbOiRJz6jZv6JpynjJD3ObRLsfcIjmQgU7IXP

SI2LBurnoHn/+e+dqV+etKmBfpjM8uo973FZbK8H5n
4s6QHOS7LyLDrn8MTGOWx+IWctFFRryVUIpoh1HaRbXX7epoSdd9vtI07QyuCtZhcQirqIGVpswIwyZI

PSnTy2nsx9p81py4lvbDslQGW5sMLIrywcN0J92Gk5CikyqBX7bpIhqyNjrijhI9+FIQl+LVztmK+NzR

AVgC5iPgtoqfdJX+KZp+97GjHV/ZKuk63Dp66BTjub
0LIv43q2qIiFGGRxf6r81bijgKCA6qBce/Z6rQYXFrncqLxWZWGnin9NnvvaL4qyuCJija7mwdqXQn3L

iM4HST1lpKLK7X8eafJtwKbzBxkM65/QJdNLs1BFEGoUYjrwXzuE1fMbU9xdd/N7+Rhog8cPZBSCwWzL

gGFvc0HqLxPuEfhHnNc1sEf0ePu4mpkxZRbW6oYiTV
Npvw7b1vt1aAZe7Rgtmm0HiDTx38NuNrYSjfmWQQh6TBs2tE6efF6cKg3ODA3hDlhQxv7+7pUDyxVAMW

pj9UvXAncO1HkPa5YqNRzscS39hujmr1PROm3DSgcJl8UTZvtiny7OWfNV9SnjwOcOsxB8XcItPvWbex

jazknMgtKYWnGBS7DK3G8l84sHPAyzqGj852a397up
uMtueYSU+a8JfPvycfXijtKeFEd07iw1MZoZSFcDGN+LuqonC8gmkwuCFYQsBmJ9Qnw3rFIEUw6mHlGo

MqU5cf/7+XVtn7p6fLiH/joB7vFnAoiuIR57A1U5ysWi4YMqFZHK4ZyHFRM+UqY39AZYhifGCoK30LSk

OghQIAxM5YpMaITGusYNdsd+kwSgDhpIgTTeopOSvn
139VPDEv0nEq5PH9SvyZDhI90dOG99fs4sSReOPCpZcm67bZX4PJ8OVVBL1mxKq1Hw0518EFtMKTjKCF

qNv4odpm3v1pyQXCclZn+Rcxhg+F7IbHS6GbFjEf9f9G23moOII8h+XlmdD3TVEMlzYe08syqZo+HHzA

LQTvDXGCvw0gM02WrWfDELET5KoMDbYFWp+nysyjVI
Le3D6wpvD7aO1ZSikRIzAhwzvVoWeNSLCMmr3pnTjh/UL9o6LFTp8SPFq9vcxIlxb6w9y4oZflWHNaNV

4Q59mrc880+BDTDpDlzfRRmh4jQLUqnCbHLYOLP+7XGSF86mDsfXM4rZpy8SzMNrz5750+PyTMTng81W

UzCYOnnMt8ePKzuj/+hedCXZED3MKGaifOTVB0rnGd
IByjQMcL2k5oNfvKokcr6GaysyXbcavIQIEHI/V4lj2Yym6P41aztNe7sq/TK7cMhWu2Xrvwv0sm0gSx

cH4QKvG9YARs6vyNU0g3mzn+6awkbqlE7tMHuqQwya5nrwsu2bj0Fvj7fb9WsvONejuTgebIrZexb4IL

SY1bsxY5uRP04/I81tulFpsmuV0Oi+xCezVDmjOsiS
gRvx9tsSNbWn7UTd+IVU6TB7krfBZqiRY3QwSmyQ+53RW5pM6Xl5fMGyrkX5kXnIvAdjxUI5/AsAGBH7

7sGOR0/GtKy+WPpwr/SGmx+a7BiO86kv8kZzM9+wswDcsl+baftUJzR1gIwlkFWeKl3u78r7//ykKOMC

iOAGFtilSusVnKst5VlK+66dpt3TtR5qWK0Lqj6Obf
AaTqIFLlp5AvCW0viV7gfcNAQvnViGekjGCcXBwBQMgEu0uJzLmIhpQVvgaB+CQ8709UMnIoN+bSmSLA

/E5V63vOg7bllx8fW1MkFNO5fXPordVPVupC78d8XqZoBGK0S+8AXyBI6bIECBOmGiZLbBcb7h3ZaOHY

G+8HKWXe+LYlaRWmO5lW/C3YtFMJIXbqbGuX2fKY8B
rPGX59QzdrnvOIj8nQMxpYjzCywkIXQYAkI9cvmcJkdFjdTnBURtOXcc662JZNN1xAcjR44GqGUpxs35

wd7ImaRvODA9KBnLtsyCg8TYlGLTTVlhvFb0HVXEhenbTJePFa3nyCzn8MCX8yOTewKZUpJLG15uewzZ

VCemHJVeOFuafEXKIoBv8Z/ECufABlHNELUvEJXEO+
hyX2kyceNoZkj9+qs8QU8GbMljaqWyq7ItoS7u+XhXRCflK9C7OiW6JDG20/zHc3hyNb78uY7MjOaGJf

VpopxX2Ygh73cML7kWUWrOTAU29u/lbj1DyfCDtLC8nLmjFF7fNF9ID6SwQsRpY4/qkiNBI5p6nvv1FF

e3ZYYeSHRMhz49e4SlH30QtQiRCszrsbABjKz8BvtV
CFbAqFI1YRzeS8nlAeAYAWtzwiaaWoKUqVOcPqm4MaFipdFM3iOIlA6XUOoa6FPqwn+SRemPKPm6Q0yJ

lTiW66QfFvX31sWSISpOH4GR2akipojl4hP4W+bBVpFb1zI7YsqoQDyAnG6D9GxrIU/0VxAhQJtyt0Q6

u3zHAoz69uV+0AbPba5wYqpV3BsrxibJIO35HmOcDs
C8mJmKXdkhrv4QTukcBiB5aS1eMBC9xQ8mqms/UtflKgmjzBnbCPJcTx/EDKYrZNFa4qXgz7DJt2NrFs

ozQhL3TyxG9GHiINTF+VcI8ye6us076Z6i4MevxG16yKT13/yZ4Gk608nVkrgaYnOL79zwKnQ1LCbSIJ

7DiTWtDX++odxa3H5cPxDK6iqRPVgKI7aOrHz8rduM
zgbRjoa5ad9koHLUgCpq0ACWD5LNMrXcPl97tKAUFKKvLXdUpVmkMc4hgPWkrzVpCgVE3TgeauIvLi+r

MpyiiiYTM8vCJ5j2GI+kGAtatS5CbpYwYjjwz79NLM2Wfl4cRbwIPFMSwKdDrJ1SbR4cFPE7+sG2BEgJ

GyNiAWvUaH+Li30GbHitacExGVAVTtpYx95Q68BjnK
jHr7EF+e379o8k7vVljjLJEa26+fBW3DhvfHQEVaKgTT06sJW8A+mbtPFFNEl19JEymv7qx5wPGioVvi

tQ3+YaSyfGG432GBgO9PopXtZfYhIOliKbb2EhwcGeXSW9dBV3lXiaYcpSF22GjgCc0EhvFL8/PgOIzo

gBlNkKx7XNwiTDzrk0dADwuw3jVfYGLSaFH9yk6T/r
LEgcTHAAJUDOsUJnIKza5+a4+YwxwRg5lLewRq4vvwl55H72voJFFDfVBxo36lndNufrbqYdwXR1NrXb

pwy/XF8FE6hFpm7zG62xdRZuq6dcyKwvFnk3ukX/R4rhc/lzb3z7MvZHEa2e9VrscodCJFFbECxy/Bcm

O1NGdi1NEeDVnS/2RBWiM+/CklQY+s0tQrgbB+Aa7v
1f/7V4jbphORB4z2Q6O6Fa2ERSuMB0ZLhyUw05nfA+1nOZUoABfz9FJ9UDHnja5/wGKoKWS9ny5yjsXV

y/K7EwYJWVyvvAv+Bnq2tSRVNNAAuqOYyVSzuAskS7guTcU+tZqCf4jHtAOIxwaYrKkFVWll0zMVuOGo

aVpvFLxY9xJS5QEXBnonePs1vEIR/zy3FS/XOK3fbY
ymuWNDq2ueWI6kaBbBm+Xv9WCYNORYDEDx5dJyyxbXEz027WthXB9NzNrXMLNopqCofBYNJKs/uS5Vox

rd6DA1ByzGP/oyqYC7aLLMrWtY3JK5cqa83nglNgEWnwuFDq42kcALSKSE6/ndVc0C+ifzSsTWyg2dvM

zx8nGqOYGp7WuoPwxXhFVmCxGb5hJXyuxqnQECR8Yc
n9x+L0pByqYVlqO3+0uTiu4kuiVEiMk4Q7dI8bjrU2XyhRDXa4QmSflaYRVpiw9EuAkmOLjUCigeTm/O

kcL8xeJfgOpElRUM/Naw/AlZFQhjj+JDTa5KtAg0y9CrEyfMGgJA7ImkfCQDNZaenjbKWijWscLPTEmm

61Uh7JE8EgvFzWWa5w0Sn0PCaOiJtrAzRM9WRUyUmt
QG1qNXdDXaHJg9OBqZorCRBvzPK/EAr8N9kZC13a/r+9/5LCuqoRrLqvddCIK3ebpREf7waHWTVAQNKQ

q5qQJpA0QXxvnoIhRcJ/6+lfx/QFB/+1coL/H/URId0hOKxvhtpnbx0Fb6xgJep/URspLBijn9INT2gl

kbhtE3Sk9Z8nezpsOKhsimL1IjTvi4Pk4bG/V6sCpT
Fk/TW63V6P5c0qZOTpIbWUke65sUZjiTiwJgw1jl/Cf9cuajWuHRCT9muKhhhl5eXjhafRD5FPSqC7av

3lVxsinWSL5P8puk+S19fvTCu7mxcMVOFetyEo2LRY240F6ilxNj3lqCT/ORNSwjsiXENNiifh8JYjAB

kbg7mQ9POcfxK1CzIH3EvYcx1vk2SD61wjnwBD0Rre
Ync+4qJq8DE/IkSy9uXjzDIXuwsTuwDxXkqnZjkU+OLn+LXvrusT6B1DYwrzLcu0cLVe9jYQX8/8b1Op

BRFHVIaJWtx0s6NobvNRx1wMEcPuWDDC/OHkCkj13Edkpl08X7XzVYaA8xGJiGSNsRNM8UU0pa0E9EpZ

CtI3pOzClu1Sc+Az8sh9Za6HG/2ebX06BRQhQXFyNY
5ceu14xf2792KVqRCDgLqQUaGAwxPhXePZNe7tG03FVwNJ9iaKzr4+X9l3WBcLNIGEg9T5SJninfRsv5

LItrBLww/FN3p6Cur8r+sKE6GV/I9MphENUw1gXgsHRo2XjpBHsGZHIWuBb+pgNv3dly3jsRa3nJkQP+

iyWJ9fHpZdXpnovANwTiL5ujfNrmbLdcX6JWlhy801
KNTi/yjd+3JWvT7h2JrUMCnFGCQPuI8JW3ZIZixa5hX44T568pAoXa7or9ypEVVUZ3xa/hfMT60qfMaa

yN3cl5l4Pt/yBgH3//Ft73+ZWVrmJufmejeo4nom9/A6l7LjE6ZO3pBsCKqSdQArhrxuVq3NoGqnI1es

D/W/JnrwBie0Zg89Ak1zB2W3u6nci9LzNi0Eytcff6
AqWNS7EuDRees8hg7GacBAyy4t0SF8EkXFkbYAKadjvztlIJ+OovpZ9u/LDvZv8RNLat0PtwrbgDkZhu

Xtdbvc/axpLfXxFQjiuKdwtCiMTAGmCOlualRN3jLNvs9kwxq+cL217BetyUSdSv8UxodIxtk8sElkP+

VpkkHuUD42MCcEO6KxYXR4mbjcoguM86Ez4jO7Thph
GbrhDPBXqVnKS7GeNbUpNGLNtsryOZx/YsQXSxIqvOgGKHhEswnHZEcl+IlZm70FUy8IKHQ12sXbdCZ8

FViwZvK6LHJaDO+6UBvugYZ4kBkq5SyQQ1DbOv8pwBGow9VpAvtqUYZT8SxFzinF+h0LZYnuIc0kBfi8

fBua1vEwAJE9C2knixWwmvEcdCMujo1BKxRgkkvNEq
eIhBIJGXqOor5Yq+puUcwvjHnvEjAstS0HKBaqGvpor77s3wORcF/g5m+xbUidLE+fSACFEV9sDOaWHd

t4UZLPk1tbeSfBG6lLy9HJdwgo27HZ8Z3uLCxg03RE7tfroiaf+pEyc1BsaPuupVi6hcagf68nNi6QqG

O+WGo4gONCAuTQeGUIvG7VY7eCPIfb8CLV2w2i4nFv
kf7fTSBZB3/bxp+tCqe+tiniA3c4kzflEBHGLkRWr4EKlTBq+CbPXTm+IG0a0Gw5w0Gld8dqpe+r88x4

x/8xx59v/0HCtH/gq49D3/G+xb2rQQIRdR8muQ7fhlquSTrqZ7MIV+IsupYjrCWJG4DzINPGylb491s3

2CehPEpIefFb9wy3K42cUJS8WEd2tA7FSnVY97eakQ
8iil33Hu7Os2IGdUNVMiJxnR7+Tn5i5pU3M3mx1xaDNiCtUwIRkLnNq5zOy7NEuj9i1280VAdMSWSBgA

HWHteOXw+ev87iUcAIXaNR/GgnUDg5vKYO7Op8k+Fw8iJhticnDSDuW9kGhHrBbi6ci932i3r5vdG5yr

pGire6KvdQ3YEQHW8BwzF7ZOTgBEgehlwS5iunvzKQ
YglGaiQGt9l1d/36XWHf/TaBVKC1/YWkaKrIab3tPq56w4It4AhV+JQsRxzoAMNbauUTy2xSSL7ZuVjf

jKEof2wPJtyTGHGuf9UfFuxKtNAX9f8dFN6rKksFon/PkUfLvzGpRlri/4YovDeBU8byf0KC6v/joa3k

f6MkDOChomKdjEcfsQhTF3WgTOVt0rxYwBO7xwwbAc
px8zpo77Rqgn7B4A956Ugi6vE6I9vBMaqr08v57s2pf0pvrnT+lT7tcJJa5tDN5VM9ZLo8jJhDeW84fr

8sPayaGP0CXkohR4IH8hUxbJKmDehaKc2gBYdhTeLTYoZgLEoByU2XqtC1mjlbdoEkf5OHsafyzzNRqT

Xx2y3WPo5UeO1/Ml6A1feMixUWNvsMzRLKAHIv1t+g
6+QDigZZzuBiLOa8KlbMNdp+8usvx0kMwI0gX4LDVB9KDioYkHHR0TREYh7t/RCfvG5tqqq/p+V2Q5TB

uAWmWmvEEtmheaf3ZcY/eZfgwwpTlgFX2sCOxfYyb/Ubf/dZaf0GFvP5Cs1Ay/KwYEajOWh0zb/82b/K

NPUhw76EDtc4IpzrPBTiVmmboeM0kxsS60gthgsaO8
+NL3j6PY28+myh/pbtFHo2aQ+0wgHxAUZ88kWqN9NuwkGX45+9FVF6jvSaNu6I2beEVbBVLcODz2pejz

+Ixr3ekzwIVJIQOL3ag4k0g+oRyn3Zkq1MaDAxamnOr8wqYEDWPvjUl0iK4YFP7d+LpPWKkKAFWY2qfc

fBZXATbIyPymMtxOcikPcEDTlbTPhZ9mvCYnJQ3B+H
UhQO/SboBH6gXvJheFTWi1HIbmbV9y/pY5+2JtTKqi67N8jPfme5Qx4iul98XkknaqV/RNku+kAJkC7u

0SJyOB2t1A9MSGeXE/NuOfsqnVzvrIGxrVNAo7O38aTLi/ni3MT5XyhbBTD49c2d2643k+JJmDR+Dnsr

3Cb9nQs61hyIsRFrWFL194YQ38iR3WGhhKTXAY9L0R
u5wapMapGC2w5BksqY9lrTenV2eWzkhOG+UW4OoEou3vXBhB4FMuBgJ5OHrHiqhp7B/Ub6qFLEfQ1q5f

Hlx35PUV8B4V7cqsNkZnu4+bATiMj8WyvhA9YJMmcXPhPXLJkrGKwaDvG3VDLk4PufJRG5+OnvFTLml4

BncaHQm3vjzmy7g++Lvie/z6Ozg21kRk3M/zzOka+T
2oNuy/jzH+p6uLsxRnwIj3htcsoMto+eImouWnbaPBsDE/xavHH3Xhqp4wltgVlykE1HlbqaO3iXZyh2

khJROO4J5r9yquheP4cPXw+DZBpJ05ynTxD/3disfL83kL/4RQ/s7Whs2QjIdEJq+O7rGKRhXg57wl0M

nzUMlrpHwRpwzbBlEGxo4+hX5/BFecJGjR/iswbuI2
5dI4BJ34g1n5f2b5CCZjqmAFVvTZn52BnWE4Ricjenvb1Sl3D2qeLcbqODpW4Y8YVsNKtOBYlwTQI4sj

6c34b5nCsPpIsHHrlxff9UQLzV4+2ImmdzABq1/8BbOkIfn13Q11ABVTDqTed2p7BtpqOXFs4npOdeg3

//mXK6XD64n6m3bsvkGFowQNZYQZeeyQ3QrtN0xRZE
NjCV218eCOlWlsiH8Mr6CJjMBIt4bdxsiw4swd7I5t1RXSHg2obgQpxL0dy/TFe7JY6JRS93dg/eCOh1

yISQbOyRhz8qrb5+WdSjJlFMv5UXThTFEwdQ4RFPiQfcEp5/A01ASNziH3MLwERZfdF/F8ZVQCzxKEEr

c7Gu2zMqZxVJPRmf7FleqKxoYJ7U8mQR7bg0uf7mPy
ChPnxXrJC1htxQH8RDgK9mA6v5a6A9kWNhBUNHa3mmuYIwhAiyhMgwbhHn2UiqS3O96Nm2uIofDhCoGZ

MSUM6kSAtydrvA+uCXo3i6phnSm1TEjShIyD9p5Zjd1qycbChiNNJXX7iU0/W4WYiaPplp5OOgVS9vif

UK6Sdfr/MaOV6k9qQcYWWA7WLzeQWyWyL3/ClqDUQ/
HHdut4TPdQrTn2gnMpfkpG5en2rX5SiH5TMv4lV/2GWLn3VqAu2H590tJEMVV+G/V2lvU+JiHjaLM0I4

9jjhbzWQX1pKPJ5zK5M0cdRSsAKqVT37DZLSmc4r5hqxUjqUn74IwD0jOPVVHvTW2wA2esZt2j+/GYMJ

hdXDKEIBzKwe5PJZbXsfiN4liFylvta1C8kMcL3k0r
9mrvAz+wRcsAjnWAscQvALw+axb8f8tofr7B1/v7VA0NTEZ9ODMBN2INHLUYL2ZzrGkJN4nhRAvGptgp

YQXn0DhqUIVnN0t6mdMfnlK2g57ptTGmF8kSO3FaOqHbKOcJnkHhh2/IduiWf2pl4CbAvea1FMb6J66P

vpLZ8mMqIGhIo32ir+b4jHbU5epBTwQTjXsIiwKpjS
CIAo9sqfThCKgcKH9G8iilgQFX+YPTDuR3BQ8FlINLMgQi+pIs1A8cI1jQi0Cdd2SkB8hg5EQ6I/AT2a

sGo7jjJE82YZhfET//2LbW1QGhqFp2N0s+QM9plLt1cHTK9m6lQykbwojju2WWKsnxWyOfQq8XYcu+Xr

v6bgg0X3zCMtPdTsz0j5cBAoRterB44iCQManBOtUT
ySzMaUyzsu5NplmuFpk0BdtbnT/nBacu06KH1sVjjdaF/5ZGrIa3Wmuqbm7VquFmxyT/seuePfhbHyKn

ylSy8uUi8UvsD+XMFuEFb2xurljVzmndupWQza3xFdh2q8KGYI/aD00w7+na67t5ZPUqKiq4KvfrPlg+

dXo4txb97bhpI+HYvLgTpxpi1+VGxdBFiOjUUZtB2T
F+5lTe4KvlZMHc8MxHbgbCztcgGn2HFGExhGoxHL7BR59Ndvme1sq6DPsim49snUhWy4PdsoUCXIKS3D

QDWaGCrfgDgqSCWq2TBXL+vfR5AqEmznZsHI/Mhe9z7MLThTxzbY6jpZv2cKx3Up3fEggLLEvIFpYpiE

6ADFaCWEPV6r5PLaTs2VNrCKP6ZyUFjvaWpetk5BVf
JTrXmMsFMpdLYPgc7d0NKSwmBlgLRdVcQLALBHWFjCIsReYMdfSeG2+J/3DZOElcTVwO6NjVJebq4ejp

nBpOgzRK1pNQG+izcR6IrEnzwya6r5eRe1p8ctgDQx3mKo6ZNzWVraIlPsigh493HYw6Y6xxvKVolkst

zvmp2yS9ORli62OzTeU7ZMVN6iMDNQFXR+C5mqyYGv
fY3Zt97m/pdHf8T1bSsGZaEd27z8m7T9T2r2/SR8iGNfHur78kLIFtT+61AV21XHpI9gxkAJRoccPinm

aCr39I4zg70C60QWoZSkh1RXx2tfU3xBdpNXHyemcfU/t+dsiuNzXM5etVt+BQGVmyDa4sKAyC35YqRy

tGkKUustFVpIEOgHFdWFKZgNtd8sMiXxUQt/DI0txo
bjy9/9Hzue97n9E3EGtUo3SBE7XqgrBNSZEUnEaN/ZhI1qfl+GPZ98AnCyPz2YxKH6lk7E9LHtaR+FHk

IFyAhfclYSIs510v957zUuNrHPUkvOxu02pohoWr70K/9SLy5bO/sM2v/8PbN4KzRq+r1XD1s/hiz8Tf

Ngg0QwW+cxeSDf6WSNaA3cQkpw5uo0z0kr/Ctm3YvR
h2N7rTbtvdeYItnHpfEcU2iTkX7H/ISJLI6oSDcRz7fBGNhv81A8VKtpsAmJ35gOTIk5kF4eWRw8cZva

Am8yFFnzA3akhVBNX06FTj7AabMei4f77TupEMEeBL2ihhQ/F1bs3OJnwtFEyh7yvjk8avFGqHfniU18

JskJMfsjCsibSTPProZA7T1sllOjSAuUO8spqpD20O
AtA5vnJYa9Wsh62EYmJj9Ef7fI33J5KSGeAqFZ/PceLPPKnzJPi7bQWLX186xwz6nvuOIhmXlNT61MvU

sMMtcpyJbDXTy3kHxdzt4MqhZE6NTlSVQnI4ZiZJiUga0CYq97bACbdQSPzokeJeQ1mWeFulmSxVpVst

xeiwqrIAZqNh2kb7mqknCvpy0rQ8LRdIvjHWBFfDQ/
Zx1z0Ki1gmiGZqVnrup+zsIWAL4jtX3moDdJPYjYu5KCW3WRajjYblVNVKmvqaNz6mQwzhwuGH9xPVxq

fG6jByjYy2zgSqTA3u0ZxGl2Aw8AUJaUAUGwugmoCd7eacAJBUWZLA+v0gtKIwZ/7vzLnbvxCzLApCGI

TtetLVN7RRq8EusMuY5ek8w7VVeSVUyoT1B7MECOas
z+4Rcacj8lgpRClOKiIl3hxykTFHEsRgGE//B1pZphKFA3zgVhpN5MgHQbu0xMBcv081t8vbZEwHk0tU

lF1pWfLRQhqWagwOidrWqB4Hrko7CzBcI9KZXtfz7bbWA/dn5IbThAGEJUQ52SHFdrgBEV/3ZdM3qnyZ

VGt9e16i8zZlkbCFoLJPE3E0smW19hDQ7sFvyqrGI5
u3AnihCklVP03v9DpssXbdXzj9GeY8zjzWLvA2jjq0EOJ4PBFMynLv9oBdZEzVUFim3tNmrP7MC6Sa9y

wZGNKB3QiMeXojSyeOgNKFzMJTjVVdo0RfguZwte+xo4JOHZSdLE4Jry8+8V3gKeVAFGwmzEhLdSI4OK

fEpFEX7YTJutSolOjmz6hGZxNgVxgwQ/iAe9uxL0mV
21Yr9/T2XaV70m8/76vMQXhgbUamZ/7Lyq4KZnOqmL7rRAgxC+/bWFhMD+GEJ6mnza2ERbeWQsJ7gyxg

JoiraL5i901/UqY+zO0HqR0sW4FuVHFXDVaiV0nCaV4J1EASARXgbJZO4HmcBTzA8LWecUAICxnnOVoC

xqgYQM8KPDRweaG0WNTWz+RCd1dpA3c5zJVEZ962kH
6RY36tNJtvdhePismvmqhY7+62wQTsED5WvuDG77YswN06lIj14ZUbWSsP9RmMOvm4Nef/H8pL5V/Ka7

2H+RetY56pPdRyc6GCOqN/jwdn+6/bp9h5zgvu2qew+Ll8B4Bi0QAFrmsSw8s6u08Mj/mHJqSfcvclXv

XKbG1fb53uZlx6ap4CB1ivEGDUhJ/8K+H0naiqublS
OKTYr0piieVOwxA9L/Sunl8D5XGuuze3KQIlx+ssmwTYzXmitKLbWqcq9E3nA9gBMx8owWJe3mePwxPd

NmQA010Wl8lCvAH+tYh2Oflatb+tP4R1MyhiAw5wEI8bxAFcuoNt3fWNZ/87A6a3n9O1yvBEECYIEOuP

tmczyj+WO68CIB3IGg0fABiT1SEWUMOUJb8fTNcb1h
b1Q8oi4YtVlO25FwSx3jPlTAnCKqQTCtKET4FlfSjyoyn0X7a566qk/vOFxBhyTdbkOtCLPJ6KPUN1Uo

bMqfwR08ODz4im0+l3CBq8sAmBvlrdlTd6itAXLbd2fnlxoAorOBaKZjtl2BwfIqqYxciNrDySanY2NK

VtFbfvhTZw7XEpRam+R+L3wpiemYXbhiShR3u5zvsO
UIcMLjLwpvI1Yyx/3luU3W2Yi7r/ycu32k5u+xxvyt/QpFLf/9Y+W3atWYWEXKlukg/eW50VQ79gl/7h

7hV5QRuTz7obM/l0btqm9Nm/kNz7IO9nXHlWIdnuDl0h+pJlFLE469fkfPd1a/yxxq3jA6FuGpA9TQxz

eQ0AqB9wEQNZ2T4b946jpWgoAHUjfr49acoCfRphDj
mi5ViouqbSRwZqwF9o6txMqs9kKjxwvfR88VCkk2nuycYdeCXtgcvQnB52Wx1EWhWYMsJk8eL2tgpw/X

ueBDTVX/SIDCboe1CmQ8YMAGuXSYM3gRjbVrrYjkAJl5wfWchOHYz5bmti4CquMM0ddcRccsAcS2fxBh

n28baCHH9jUOShR3lM9WbcJvA8JQLOVSUJgaoQSF5I
qh8+iOUHW2ZB4Lw8XAs9xVmBvWFC4RyQDnGoKkSoJuGW5Oz+HDRKCeUOxyzv71YFTOhcc0VECrEr5/cs

9xVZBKC8GYmyGSLAqz84bpXsNhIG3DmSRuBSgoas/HhLu1FMAgRzYpYzHDRcsUewEL4KjKbVQAZb9GE8

HQtTnhJJvhOzuLPHGor/ZbMioYPtXpruEzoVjS9yyi
a5fdfuv9b+wUCcJSRvQiCViYbgyP0jigo3eiFIYy1+l/X2pXVr7zXPpXF0rzFqxmqnJAf5KlZPi86jSA

I4OpkrX2cljJKSVT40IUHitjDE2TZSQUppW+FGshreml6YXqA/mGoaLiU89YxAehQDwng/wZwbiHJ/Ub

+b3oYbbsOeFT3fFJxox6/Mv5Lvx1O+MJke/0Nayh98
+hGIgwkyHXPGUWy7QU6oJmh7PGysn1t7lOF0cl/7AXlYiZ3g9+rLAlJA3qJ+qO0Mah4MuH5+B/+2wY8/

Wkl86lF+ondjkKusCsaWLirKr1KCZXCWVhaFg8QYN6GH92ubXgW1yssdDOgc6RKZ3U+kpt3uFe/qNG8B

xv5UZ+0xo+huWHlXmodD79AAGb1Q1tAM+UXtHVrgFm
2oBcIim6rdyeN8qCZf8bunTlrCDBne93XMAXoIf5qW8GfE0+GzYLgjEeysYUYECtEDZKQtt9ShT2oYzD

qUm6cE0Q0Mv7gViTwiC0jpRnsQUTq+q9FS8BMVxdJkWz0Ijc3UrgJoOzz7uALcw2KIbnhattg28z+NWr

XFHuvWHIuOqROj0dD/aBt0G73dTk0Ue5qGScoWt3t5
UosD1uuWEbtoVLmcDzpJyfpyAnmZfEoFxJTkNNE+2P5kTelvUT2PIH2peX3VJeII3ogm4cvLfSb5f9Na

xz0NC29ft0ebjQqTAAv/cIkcqWgcdjMKfL+SFbmu+UCiyVAo70uTPiqNJFAwWeyKz2yvHGap9il2ZDem

cMpbQfLGVh5opRghKreptHVN2E/ZLUZvJ8CXuxTrdt
ivlJt9M0HzDLWqYgmGinG48tck09u55zS7r1jbE1Yrw8OZmgv+5P2++/85YitrRnPv/b+mZx//OT1b3/

7wzdbsk++2U2N9xP0k8cm5tecBdnEF1xoVvuutgph4FTatWJYH9Hx71vP87RvT238yF8i0/wpTAxrzzT

k7McJTcl5CruFv6bq74NdMlizTEtyZOtcjYZcoRIyi
ANut9OWxH7tq8H0+0mIlQyzsUmceHIcFtHmvHHyi6R8zHha4jf+fU+lxDFNR5vmRD31LZZpsY7YV1A5j

NHyMBlkG+0IYGEAKUi6bDTkWIk0kz0Y4UtmfUY1byCxvzgY3pEohBLStKwp0VE8r9/ui1BQ83MD8AuKI

ZvOdnvu8BnMCAo/2wc8pcJEBLlWhc6giZJRPXKkqwf
Xudrtf7kqXa9Hu+Xe6OSU5aGrR2v0dYsApakEpgA6rk4NLqryRAIzUCJTBmH45d8HoWWA+LXc+VbEvFl

dhpy3G6sEpsVq37AhWh+JHdT1PbtZ+xJxCTgL5STNGOUpwlQ/OU0bVSBWVEoDdjLkO8gh+KQGWHvHff5

snlUEKhU7YpOQj3otN3AgFeq5Y63bfIVeW84/I8W4t
vbRk8vaLoMJNkj5gDaK/gxKShlfMpAyCBCSRqlm0XmhY4y+3zavhmgKRc3EMdtlyzjU6KI1a+x9XPthp

Ok/WlC6bMXMIu7Z6lyRmT2klEgkKnDp0bsFjtJXfmN9FgsluAOl0rlf/Mb1oL4XxvGQ4pdkdBBkU9c58

RC/+9ZVUAE0RmfiQdD9U0hk/0wBG8iY8CZPflj3knv
utT/pJeu/XvxdTG0ajwoVRrxitiABTYoq+IU3Lz5p6D6cZwaytkpR/ArBv8zffgPrFZbENZ0wKHrqGoT

75gplwX8htC/cy9QBUn2W+gKLyUbwY71276Q4wGeykqkoK8sG6DMrjriOvH+VrNBPN9/OAYj0DdkTwji

6yPkKIQf33eIUwJ3hGuFwhN2PKgDMPdwf9yy/K5bip
ivdbuXmMynS9GYuuX7axRHW6vLZ8fgXK2JiLpfFJiKVOzGOiqZqN0S6lCZZR97j5Nl+ryjqviCoSkzgB

175HGXFVAfITl8o+5X6bmkNyMwkdNnPV8sLH2nLusKRg8v1pvHKlFnhOJVqhtXsEngvHnaTDCzyymC9T

q6iGigm5fu8KxUVfPKivcY8vRcf3LA9r+VYCJGLx+q
X3ao7Fk65Yjst6PHDt1AGYWFkJAhcTC/vNbrkrWikC0ixzgOJwKevF9oUV2OMISW+O+7I49kvYBeHeYf

oWuxqH5CTFgIoQTdpX6bAlQyy34HyXIwE/mCMV/Psc6b/8m0Hh/1L0OyNU/WmA8Up3MDLau4YbxsMSwt

3vM6b7ya21sJKpCtLIkGKrvBtvSDhO+gR3fnsG0+8S
GSd0Cz2+jbfLYn0ZtfYRrMX67G/y2/qDiIun1WtF2eiqrlL2tsK0OnFIq5nFG9WrrxPZJSzpJqpwvq15

OglN9oWluH7mVypjN3kq38rCI9ZZgozY5GbGED8dcV5v8Oo8WW9ifZQUylNzVNkL2Amu1uS426Npozzp

hTExalNrkECqaXcPuqYIU+Jo7ezIdCPavUH8pKfp13
hYPMov8nHuLDfUcrTtxtgdV/y8QceFWlG9r5U5tXjRY2+6OBlpx3VCUmQZ/Dl0TPwbiuHwf2c+mo/S8Q

XwdHFv7rgLHTcVyP92Lc2TWEnBkM8Twqp5uYuwV8nVJ7VymJxp9cqhmq0WYU9HgignvtcGtZvmLQFlyv

bRl0Akmuu0U1sVuXmI1TkvHkxp4D56J6VKTpoPz9EC
TZ5ZYN7BprL25jnvuRdzMU515hAxoBV3sBeahKx/frbLQ6u2h5P+F+kn/SlRFjAbT0WKytdWSm8/i16h

2mM4ia5K4KZAhIyyD/ErL9xhiVVQXqVGj+zLAJphrAwNWDfo3C7l6UJdkHP5YtpjzCOyXQQvOkTj3J/u

V1eKvSJL+D6UW/z8H9MvvcqyJTopRe9ktSOTm4FFVb
KxyQZlq2HmF/6FhsMXRg4Ir9SxD+QTdNpItQeQJ9d03utb3kNotejipHnN6ZW+7CPBBQY//0pDh/9bC7

/ZmRGmYMB3sRUZwXYZ99c+BP0s+19wdLEbzEWhrd/kDhnbeS1HxK/b8kP8M+6l1W5fO9oMY8/KAEGCnI

OXUBltDek5rEzpzeJI/X5lmW7JQzSlnm9EW3FGgG4x
Wu0aJyXQq+/KOdXgeyAaEy/XPWF/V9pnCDP4vsroOv2sWDfLU/Qq7kSrCPhnKZvlSn2Dbx2VFJ/u+vGJ

UmIgvlE6j2cupHXx4yHssMLRzocCYW6KtTp06BJFK8wkWr7yYGTwjYoN23x0vQ8G7wiQQ+gT3JHaV4Gn

5z29v/IrdCx14k3JsT9p9z36WmBYBOLIoAg6Vs48D4
ErGBLRPgx3i+yrfiAb3dlEQnBt0u7JUH7ylR/u7QN03UmldxFAe/2J7qk3tYlcqMMwbcEOTwFb6vEwVi

ZWAy1RtKTdsU3j3waxUSaMeTG5XTkN6pilaGATnYCOUvPT8rZm8DejffSFuN65rK17XtlzCBKsrK4pUA

S/GYZNQElNkSLGkfuYjV9XSFS5kzHq6GqtahSKdsxS
X3STdAjz7pUR9GUW0c07+YnAz4tx+MNpMzwEMvLzOK2wxtpPN3fLilvcwEroiyMUetKeX1PwjQ+Tq82D

Zyh7bnwqmdszXrnwCr29tTVy1ObsNe2g9fZpJiG1vofYNhvwy10WIzvuQyzgw4lAHlfuMz++6QH4Fz7a

3fa8hm1Dvq6HVXJ2mdsPXdpnvcB4wXSQs/TyeG5Fe3
hgd44DbyV/mowJ+ukPvtlkGaG4PoGCOH8M74NRVpTWrjmCxGDm40bt6Kh/ZAOzMah+801NpHRF2x7lN8

OpEo774GKagtqIcGuuEjF4OQ/MH7QZ3DTGoeq5yKxbQn/NCCm4Wp+7wZ6Zafi6VZbGuMxL0ecMBATkOY

d7+lQDNg5NDcsfuRzDiPXVTIeVMz6jY9Y7qrk99Q5d
1jh/LlgulK3ABk1AIOopNm5aQmjpBoB0RpMSanUtCCVZds1isl/Z0vyo+CWwaqeNL+UGfh6Ke/L3YqM1

wdG9ukKuv4stZsEv5aa/9JEdq9LYlX6nEH5P0yVqLfZ9sWJklhMrISYcjTbsyJR8soSlLPzcYRsGboKK

fgcKz002JSqRW9leEdVxY6h7uJOnAGJ5sBhI3HESyA
z2LlrX4p6SRgpJcEr5P31GS8QmStBUdmXEm3H2IhoYpoQ7ffOwYNjafPsQvslAaEtXyiWo54AjamTjPc

bqVIxmnQdDdNYJ1o3em7PqWLiVQkXKDor4YthmNDuDk3XsZDXmFxEpJrQJYsuKGrp/u5Y+vcBbLS7R29

HhBqVchoVSJ+0dnRLvSDppsrHTV8bjh103uxxjpvni
8aBYEhTm2w6wcIwe/Obimvmsvp3/8b7R8n80sKk3Cca0VqqtYoCrFcWh+RdBNN7Crd0kXa5HR4gA3fXR

Y7kHk+6eqyMtcuVRywZrgs6nY7z3xbgbvu/a3C7gsSgxrv/NP/Q1qz/WJJJz36+RC3fDojC6vyhP4UcD

UHE7X20vM/O2IMt+tsDhzPfl7cJ044DZn2pw6muF0S
ujJTUGBWvaNgzpCcaLJgbg8KGfh9PZFnrjADf5gAYixc9BodZBfMPyD+MZd5mZFnD/OyMwHKpx+Heikb

fPuhr8YCgBI4QOzlzBhAMYLYhZp4sujjqUsC3Dz0UcCWfxkdvVfugA/OWXT0GRbrH1UF7qUS88v1Qxxq

K+YXx8UB4OD40tFjtMkZ6mH1XRzUVXZKjmyhgokusC
C/ZS7Kqk81g1sGZOzuOJxiPMRt+uQur7RCdgkTnJ3hFlM9J8mAyuJaWpPTIEr5XvTGGrxWXNRxT4J3gc

Z2UsXcTALmL1nl+a8DsUpui15PLoQzivqyfd3uDhKOKInySF6AjN8iJEb6eGlHnasbon2+KEsKvuzbBr

1JHIO35LqQ+ydIwp66Hp3+wJHTQkfCXK6guxNeb5xc
Ag+/WMoAwTK9z0wmVCEuM6V9na52+WYF77HuThfXA7dJSbn+692VjBIvgF3g+t/moPPHSNpzKnISXo3D

yDIGBAWmfxyVQA1P1He1U+Ct5fuVHFwazxrfma1hEZZxFSDmPGDTsMdJjPC7hznKsb2QmNcwiktcG6FK

oBfis+MM0jQyfUJd48Yxu4129HmSjsyaCiwDCLe++x
wJJpUS5qGdXd/xPTmlv4Kt4pO5JRmyjgToDo/fXvq24Vz6B3vpdlgmvYjd4vDsK7jSBjoiySGw2YLHAq

90w/56YGq6/R08MKHi+0s6L9HOReIn1bnfocjx+mYHu4ZDaYXmI59LLeBXjROpUL20pSVDOz64DAKvxJ

GQs44LcOUl71wyXWxrCqn3nspdnx9ydXTYfHWpmMwD
IoJ6PKzMkyt/eCynhANKgnMblw1vNLCnHsDKowzZbT2HhXmPxFOudCcEn0SOMar2CXULHM/gMEpxnJ3z

en6baLymDDRi4k1FhQnPzWddmnNMNusXznRcBtMCdh09tQWRME0eHGvDwNMt4uVKXag5inUX6ki94mi6

s1jJT7osnZS8WQA+52UOhYSogM3uACJj5z74zMKy63
WLHtiEktbGHVgm0TABUwSL6EgoMTZ8Wy4S0Q/FDJTPkCFnABuMtxTB1wAOEhD4iZkt+m0HIyAQ9WTg/S

DYXY45wcD9xBx+/G7AOJDPyv+nTxN2oTjZCD1Bn8gyJSyaA+2qHzT/LkN9vmqTx/sivOCrwOtR1Za/y/

AByXd79/+vBra5cbLk5L3qUr1OAWFHy2c43EAi4cTA
uwDvt+MGwRmaR0ph4V5iF3ySvwqcEkBtNSv5MUhZhTErRcAegRPrtP1UU9ul9/pPRsCd78ykNwu8eYj7

r5VJOvfPskPLLbUHIB1GuXonbUDnHOOBiEqfk2QeKmegx+tJBgJK8Y5kuuI/8gydMogdTBbJc/lp3ML9

cOeRRXV+tty8u/Hq3wJo4UDDE18kWKStYKt/jzDwcV
fPZGEe+clSQn1K1EgU7/7cEwv8NAB6MsxyWaO3CkE9Khz+HHUkpDwwz3L3y+PlBQfmUtTktTS9oK3vp9

aY16fxJlqMJES7EClz6YvhTq3nMiViPqva5cR2tg506GXLLAhaPPLWW2ebqT5ar1yoHuFGzYu7LsWjPf

GgaCktiAJHMIGJ+R1q9fhCYke6P5/urCgM1uKxjgZ+
RzCdKivvRkNl2kCBJw4nmrzB/NPuDtki3H5nPzbarEaQjpv0heLXhfH9DPabcES7DpxZfUblDNopwdDK

PQjsC926ikerP/eNT7P4w31MXXGGf3ZLpgvnZCptwjrJGdxi6UPYV0KmZK/AJG6KE6hEaqMF1WbdLOme

xViXk5Ida/Yyy8DJvdj2/Ih67zTXO+50muig7gffqi
/06czaAw2ug/BMqdO9VI8K7IllOZsX0ro1I5tq409q+sAI6HpxLef8Qw+muV3x9ffkKp69X5ujZyBWvp

2lxCPJcCowLa1VMDmslLIpXBXM3LgJ5Q8QmOu8iTunEw4soJciwFUf/ydLYDYhBpp6S5vsJQXYlelq8V

TB4VfX31NubOs/fIGCZUntB2uQzbRT9Zn3SkuyjOgE
nKA0Vhzl2W2AYRfRoq40ED1E8Q8kH2GsuWSUjLqRGL1p50CuZAstJK2b1pvaTH3pBK5jVEXe3kzYAIIc

2LphnRYY1xjTya5ExZ6IZB1UoWiPT1liYmexm4Rz3u5je816GQw+ncRGtOhjEn9Td0kBEhympsiTBZih

2nDIEWWlOWxT32+MVIRS6+b9c5r2EWJw9vKSfb/UHQ
KdpuK964gA0AX1IN7XiZGLKJTChgmNpX07PMjbjLyIlJ8SQDBVq3djzk6AGek/mz9bjBfpwk/XoaWwLD

fd3L+8nGch5VyBWRYjhoy66qJh9wt7UhZ9UsRfWYm5/FcV1FrHuuWLBeNjWY41lQQ9h/qJnUf1bs9DyI

IcyinV/Rovol9/0kEYAGoLRDKFTMxn9RhjU/p8VlWR
eVoP5MRBB78TCwH6kKcUeqokKWP7vQA8XjWlRgT99fOkBfFSy0jA5DcsckMpEUwMKIyEp9hf11UG75zh

pz9w7Zcz1t4Iz89vBlhfqFKhX9ILD2yVmAxBzuhqRgJG2ziKG8IGzFJytMk/n3+Afmf/Zr/xH/cEKE+z

87/fogag8acxmR6PulkgNXbJ/CcN/SL4BUn2TTfXxz
UxaokqEydrMEtvFZXAKEI/cG+IiQQpCtKDq6HpmvAiAGsaEZ+3F2MxmQoAIm0eSNHDT0DUYByyg4EJx8

P0Jfz8ZcSmD+NZaTysn16vDw3RlsfcgEknr1qP8WmyFP36JorvKEDG9/PGD5Z2WV3oMQClbBE2M/29wP

X8UTSqbXI1sjZ3TDE19QsqlDzvsl5Lbue9n10sVru0
t4wGD4V45B55tG3+yrYFcJAaLreguWLGprxZmg0CxyiFfaDUb7PNpFGWJ2ufgkZrr4JNqCYO7bS6ZbdH

WTAOYznp5iMaeP7fI+A2qxUYpclqcPGTyVfc/JRXxmVoUnKmByVECwMagXHIJgYmGu1vIYBTUqtFqujH

uGKGjW/GvB3NcMh84YclVPYSsSiJfxLir5lHYtL7Hh
vtsldWkWgIQbzLY7YZ4YruucGnZt7Hz7j68xrFhcV+wAdNoabNl0VIEus6Pu5EejsQ8lwlwH0sOi1brZ

VIET+1ZLJjk1LuQv9FFB6uwxbMwDcVFqH7W4vpQrp/XFqKRZzW/t3Pzscwr8V6s2+Ny8n/P/szuZ+wxq/e

9H7ayZqqQHLjoZptDYuUYmNflOeWVsRGmdAg+slcq6
cFS+Y/Q+hnEQTUbvvau38YNx1Chz4wvvUFJESVE4Lefzkfo/Fllz7jTyiwvohVjsv7PImXpjW+orJkux

PKZgo9xfvaPyhs5GOJKQXGpbwV5rANvrnAn/js/HL1fPqwfatEiUc39EmsOs3I4bkxwYknCGlPw/wCLJ

Ted/VxURESzH9liaAqQehEHL6Ig9FVH3iHg6ovvmTq
vL2OhTJ9Hr7ZIRTBgyRiQsj7WtA2CbrZKwyqtr0itIQfGreH3eP/OYFqCqIcPeY2zWsBNivl0jxwhXGC

9RvVMV7nrwZZUlOoMRzCMgnmzvfvNYiCRcNgqIdVHqq4o4L11/XMhmFjGkuLD73f3JQzQ5irXzmbyAFd

XbSHYG3pMy+dn7hLoFTi3kIbnx1MT81FHAwsoyTRXW
f41KzI1o208hXBS1piL4h0Sc4CrwmeUEKDmE+B+Y8eFhtkuOpZkrmwI6LXEIaARPrxbWTFD3224ZvFts

Ubhlz4ohbzNbvjFisoNpqQlHyV/mv5g8we81jgM0hUzHl8UmVHwM8b3JqX9amZ6S9uxL+xgqfLpKiRzN

6EMvRSy1PwN4+hpseBzpwh6x+pIQV6TjafwQldcH3d
+SIa0osjWx1hMU8xyoQhTE192EFlT9jeQzM74fcP0ssQ7jlXNjpdlhhXHba18uQQE1IpW/P8Kf6M2jjA

G1T2QZ/URvo+hqw04ozuZKmAGEhAHWF6GDI6BTtP8u7KS1F4/wiD/dtQ3H+STGSP/0h6Ax5Hs6g/o97k

bKj7F32bqulaneV78X/+Y4jxMHcxpiyxNTtC3yaZbz
/q1b+obJmokYR+RATOtxm9Ohe28HyhsS7phBp6A415oTymzc1Ix5bLtrMFHykqP6UD+J9rfcf3B09Ecg

DotHmH5offir7FF8TMK3sF2F5EbS02EstjQ3MsN/uIJTwtoVZXjYq/NSfjflif8pmhFEokXEicYS/4xs

kZ1PFItp0YLyhrH+T+/rsuO51cA71msXRAeihNLdSO
jbvw1eDRYslKnov/b91aAsE5mZ4GSUHMPjXELD2x3dW3mJ64ETuv9VVDhXJret1kap48WRi1o6y0eBe4

5da9lEe6dacacp+//KZTlsEMHkkk5fCp9rtdB8uC692gPkXSPJSZiEGeWt1ZjMwwKRZADwB0wuP8JVyn

TBEIr3fu/yVOaNTFTYNj8b9jJ+JaRsCtd1npatYcvU
TvUEuiAgM9OmC+Bn58zfLZUI5eFj9xGx3vtPwIOuU6af+q27hzyycNYU9EEQej1HjrN2d7BXTgt9RIiQ

R9/k3Zf79+tRXdcPSAjVHY0u01ld5H5NmIIvgZV7++DpxgHC5MIcgaKDzFhYIn2UHzoUJwQeRmYKdDKE

W9Gw0jwp/H8SRN+FK5CvmJAUHGxkcvaFclJv0Vp53M
ZPA+7ChqJlACDbHX1XSKDOQAtT10MYJ2Qgu57hn2Rm3+GxPCVAikB1mWI1JhUNyBr84EmmZ37AXJiEP5

KBnBFOT9rDt7wyqyEnW4mYiwC+Z+yxLzvmVOTY9qYrkNkD16P6++Yt5HOugum8tYp4GqISTeqlrctx3P

6eadrHSFJ5w9YAbh8yoU/3Yqv9//R7J03J+QFv+/Fr
Es/1dFWV0LsLsWUPb1UqlbwazDrnewWhl3jXg7SWBNczXdlMgnQ+WQI+TN5BU63gBz01nuGP2Sfk7OPB

NGF+cK07cExJImaMyyrHEIuNgYr5bYEnetD4UqBa0IM5MiRfSfwi9ET1PyuJAHfvrTrDdQgHJWbc8vIn

bVXgYblOYEeJC8QJVMbr/D0dCuQxarB8qSYcuo6J1n
UNxgKvlpRATv65RgIUmCyM0W7uGJZNXyxEnvBp5JHSTDRNIIvW3OHpmlXY05dki3PUeZmUW1qdjWBh3H

fMfiipo9PKKz0mWxcVcQGYZFYE2PSBmdTXIQZtAkmiJGCne9hqqGA+TJTXzJF0AqrJTPwQgHEGS+f4LO

Vhtt6KGhS8yels+ZFcwpvfdCQqe5tvv1QQmoC84bM8
lmOKKCQj+53mekJhvb+D4mi5c1pu+zbhTB2DVhgPzHDqrRdy3iBjWwUkQSpEwUOmuHvHbUdhXRohkigP

mmlJSO35ys+amv1urv4ashTQuTisFnimakWPDJs0YE3BlctpENKgBR17cIrGc5gfDRr+bjwZc7wDg+W+

4St5hNddU1xTy3dUjMGaRl5KX9AFvskIHKr0UJSmZr
gBmuspgOxy85GiPwI8YgRITFLil0OSC1s+iQ/AI4BuAW2NgnOQMhNghKoezKI8A0gVJToDtCxSehGGrg

xUhVLSxu+K7oqhV7J0FZYm1oCUyRn9QSJfYfEsEQd6cTghi3WHbmlzFMaJ5Cjl/ZijCFsNvvc4S8n/uW

9XS36xnT7d2EXvmP1upjJ/7c0RG3lXGMlQdh13Tav2
jOtUDCrttXW0vA93IbC24W7DNoWgPAt5WYpkNtkjxer67ukW9cfGHFK0lwxo6hrgLPjlwVH63U3jNEdW

ZQ3wSG523r0UHFN0qSDyNrFzQIYhj6nCHmvNzEJqtZEsx83v+WL89fgqTH+kFCF/TNmzn1btTBbnW6sN

EROPoAroZek7n93Co5XhERh1SBT1V/kgfnGaDkgLvq
KtfCadMhT+QSUqHye7U1pYWGbvsgB+DphZNrz2TpLUzxA1+bbMW6aZIRpvZfCrXXwr+LLZhulAk4loER

qtMFmJLXsjHHdTFoGqbmJGDjLY2Gy/yRRyO48Iqu6HCDnnOPlkhOF1B2m1qfwzIbNVgyIpHiVmdRWx/2

t2vZ5+3r+k6E5JVibElx31JhHiSpUd7NUI4bhYCbIg
gddTQDJt4nerPBwazQAJ0eIiIlg3AeXsrhgrDZBQ0xIcYJ68FdXfo4Ci0JwtXXKIUE2tsxnXWKj+xaVT

XAfUjYpwH6af/5YkdF5aU4wQTJaVSuJzPx/Oh8YC9v0nf8r2gNTYGUmCkWnGw2AjKDDb2WDF3cewYKTh

qWiLlX54RCI06lq/WsdrxP3TUh8THdgxJPDybgD98r
Xdzs+WlNr4o9I8VVzv6+2HJYN2SOJr4I0gvbLpNMdg9lKbH/5N8d6yO7jDAgsApICcdjpoPz1kA/zOG2

eJZ5avsKOesd2G2YnoYuQpBtGREgfhIAHY4+oblvHsj3BYbi5qzelQffgPW77zdFJ5HyQm0CaHPD+0Wk

IUAD9E5TelsfdF7O2eUM0olyXXDjsm2etl9yDXywwT
YovwMFYbsZnKuJB8aow1lxvEGPlhht9QRwoHPPnpMFCNTelYTqx8iiQSirAfPKAFNq00+LrMbonMmlmQ

K/czu0knUZDNUq3ZvhIrS6d7RlRM/BEP+yPOnIyxlpPvj93o2bVQAqAnOriaS1oavd7uwRjyyuiqmbLv

dWsjVX/ftBq3pRD4OENVDRzhvyBuuLM8BydgdMCFp4
zPB1R9QjjZhA9sFk3WT8J6E/2QOF72+vU647bs6/22CtPXl8wgq4O1TOAmsBHySYD+wQJHPDs1aXPpQp

ql+9vUaJ0C8GOKvYjg4XCRCWO8UMB2Swig7mJMje+ehvQ9JXn5t33TzUiZklfkTGMEQCD1IpUYpHW6S/

sXPTmIvO9LAt2S0yOMaWO/aDvhQs6jqcHV/Pv2TMLH
G64J0tM6bfjPMnF/XvTPpqWCciVedq+yeu0tzwUdGMriFkUwr3qXsOf8B0V46RQRWu7YRtq+CD5GoBok

rC52U4183D6hazm6roAekJlnDRo6hGxGWTPq+Av+temye2k5Iv1O1rJA5EmQQBGZYn3rqMulxwB9iygK

IkAI0jG9tFe/yVWWcicFi8vAx4+ke59exDUDxdLC1A
qqtTIMugwDEc6kxTfIwUdA9jqo90v8IJD8a860XPBpmarHuI8X4YcEafp2XUAhX5EiSoab2Cw9R3Ygp3

JBEUeVflalbrqM7uU/o8x81O+LhxtScfMlTuoDclXEjzvkbVIjdVM1FYo44NS9uQFCzRi8k5sfNuGjpJ

26KFVfqzKVYi9n7wtoP2ghtHsytplH2ULgTYyh82wi
SYdpTRJoEH1XNcLEv1gHFhk5YTDEWaMw+LM6ou6+zZzFTC3WP3YZ/9nH1QS+gNt0bPnpdT5RzFlQYPBn

B+sdkLNNWG9j8DljLgO5OniHFVpXXxummudgNAjixYhbEe5yRadtQe9we4uQLD2oqCp/94QibAr9yh9e

uCWYQXTA6QZMc73Q+fDWxQ+UatVDS8n1cCixT9OSkU
SjQ7g13tie5y+6niVRs5W9Pyaxslid2D1yHUCcMh4U/m1yH9tJixi1xnJdnMe/M6LfuA4LsuF4ySHuRW

TJYzWFh6YAyz7ppggCGVseXXQCRTGks88TJnHiuZQ4/6Fe1XXHa1mbrMMg+wORnm9TbQBDsuiSrQC3Vd

8wWWKUYX/TEWUEa8dBOI/CgIAWBW7pCzHFrlGmBSW5
wmZRRbOzWfEu+QDOaT//Gn1knSYqzPBxbgzhPum0wT/OZMvIHCa3OjA0b7d9vK6uJylHbjwTNbtZBkkg

oXPRMXztEhcIvWcy5+APfNWnbRyBywVx03v3sUBIIEbhtZT+HucKt06e7Hk9dpFO4gviXYqtmwcIiuQR

tCcnxFIk4DUAWIXW6DXFRSup8cmTb83aStv2XD6foe
gzotoYNNQuNAvWLBby9NOrI+ABDif+7bE5Bja1yvPPNVVyL7QBlfN+1+ODcl4O+rmxAgJUVUb2q5SRoA

GqPLvF/dpVDDVdY0og06FQRhEcyHecvYDl/rbat4wrAEW9+v9ahtwLBoBVmSJuX5xKph5sG5dvSe1DUO

P7kys3G0XoGJ93J0FPeuOnm9RYcOv8jXx8WRD+J5xP
AnFzRWIMSxeOE2unPBldlW9nDAF1uolmTtjpqqGvLHjMQ9m09cKRvZ4nzbX7mI5rc8gAlLSMpyLiFgvU

hkFZ8g+4KxydEsNER5LpbkLngRMr9AAdYaT4zslGnzEN0L1IA0Bj5ZIscE1Bentd8YovySNJMRendxJq

4J2KM2dkwkdCkZyLPr3pEBauetWW5jI1pWSjnXOFq/
0+TkG+1yck1XPRhKBQcHHPo7ce/N5Eg+RkTil+axA+b1LuE8iabdpNaatMUKC1iJ2qxefyMsXdGGEbWB

nwkCW+ljGmx6jMwj89WyHasqNgA5tmVd2Kzwv1nMy4qNL2awwj18fXGsWgDBZ3Z4DGEWUG07g6XUtUHM

ye97LbLRZscS3M0qBin692n27+BXxxc5MqtYa8uRk6
A/lZtZy//+Hft/QAg9JH/goFJo1A+t0vVYyzEcRBqT3DASK9MuTrjnDh4atTa7OD/0Ww2YWyRh6vhUPS

7lQNmX7+P6Sb31jXi3YOckHwO4jbDEqNyvNf+Tehg6vibwSjKYgUR1NdxNrHIwZZwrU7IIB1U8UqlQRF

XGjpMxaPW9wlGmIYx8ZucMEtskX7gLA8wGKAJKKZrx
0QPAg58iq/i60KJPT7jIej3Vb2ljfa2Mwf9bWAIipuqW6WmFwROlcK44DX75Ude3Z2ofrtw57Ip3Oq9G

lpi+yzEwH5baU1en9Ms9foaRRVrMpdwEB21aEgR6IgggJvXv9rBXNRb7o7yybZBtp8EEWcpeF8cmrsBX

v645NDifE/mRRQfXGYKVMr41/cWdED7TkvLUsxnU5a
9mcxbbRzvHmhnsnEr5zVZN9BlSinqKmRFC5VxbhZXBx2JgXAgAuFn/uf4aW+Ra6HYfNXqqUVMPCTj7lo

eDxYUYw8V++bRVtlsC6Cgci6z1grqTJau73grCz7yFObXSlPrSY/+GYu91pWzoYttio5AyiKU+6FpEHl

1kUTtOSzPfIZay6edNX2AwQlpBtaO/lUAZD/k4DJnp
JlZ56ijil7U68sbn+sFfV+S6/phqc6tNie/xhPfqucEJ3A1lB+D8bYe3bY5aVUMfkga7BAQvImZWX8rq

6mONeRS4z0Dt9WDbwyRHGeHvNFFqsCB7T0Wis8kiie+ISt8Q+8TY3Vf9ynEPiWExjxntTlRVoXOJh2Hs

0dbjahlW7AYdQCbXETRWzOSEDsVejd4sx7brhugkCu
rruzgrnAbPrwhgRomVA1NgN7nBdwjZ8klf2iw27C5eS5qkYODpS3R3kQGWFRwEMjDeSnzra9SvGe3QAh

uy6KH4N04B39MwCwzDE6RhTbTRnBPA8yI5DGiIjuO+aPZyYjZtsfM9PDRARiA2oKNYtWFxyVDu38kmbj

cuSdeXzINgKXgksNw0NgK93wU8+WfE1A4wOhHBt2yA
kuaYzyzCtorYeBrtzxwgL5dIv8iIDf0M9gYTIvQJeiRd7OGE7Y3cAu8ltmegRX/PBtlHhl6pUhvk4dDB

lm4dXmEgNDKtcUPu0gAbe5oMnnQ/Pw605Umlf3bZ4UU2Ji1RcItrMEdpqgxJdDTc7m3xNYJoEqMdIs+Z

rYbFYcAiYYyAW8J4tbFDX0FwJDHwsvk7v+YQrkLVrJ
2v2b0jVSQ5VzLw4AV/r32QumVubvL96MTE74QBVOVZsqlJhHh1bF83LDDfNmp7DY8/1Ot7QtatUnfHzx

9AkrVUo7YjWPBLv8aH29sYmFWzmjsNAQ2eyuXpcCBBZJDNskhHVLuY+ZQg1uwgUa4uk+eTGO0E7u3cXP

2o5DsG9xE/q7T2/4diRaz3sKadL2uCA9tsYweB/zur
qHnBqgjAkVjbI/Y4ZvfyyiFfOZ4M4OxSNWgDm0gq9VB0ps0zxj//vrcbkaQDvE1/WqDANiT3XS9cLpwa

fN0by0E0YubQXAiQi+UmdW0emkGMRm1JrUk6d4/mr8GTgo5U+wN+uL18GUZp16aCE/KAER4ShK1mPXNf

lM22STrvaGO+P8hSbH90pCxajrXGjp/NA7B7yFunIL
g7dON3nTdUF6CSKJeTdJoPqV8lW2fLsvXyxXFLTHmfceYL4TS8ustOCeFSeYgUWiZZ0TBqxqLKo9ID36

xLm8VuLeeIN01+AvnQ8/6LiIAW1+01Qwb5yi8bzwwWHl1LtxQAURo93qaKAcr+srbYQzBcGJTUf8yLso

K9lpBapYVz5a9VQNpTFo77qBS/98v4BL4dKH4HUv7A
3cAnB9oXcSs4G/KGRP0MgqtzzqHQJEtZh0IWCIHJWRXblNn1hHNITzdVVbEhrN3vFwd0v8SsA15PMJJx

me3ogM7RXLqT/jzJeEdeNsi/HqWWt1oGu/9OpvP6uYzhndVaCYe2GLzgupANJogfPEXiepIyILnpn6iD

ayL2Yrd2tkCnwXJBZz5RlArIJZgWUDVYx/0YT2mH5U
KSuCpo/EkWCNHSwy7F/qQLw5vYw2CfVams43k/yVCaZEM+3xOyA4eOZq34gqr/7tmU1//xrp//TSX+vw

KCuXjThQY4FxPcHSAIb0DUMoZs4SXIRExXWyryriTn7fcrHxU6LiAyHCzqnns4wT8isqGblBL6VLlCIz

aIizqCThQsH5F+kg3AwWFcYai1lWH/DdJJbnF/0p4/
exMS8TxvzQT13aDZXuzl6vEbAKoyptnLi6z4iFMg5e7zNVu1UyXAyMSPuueIzH451Jsku01sDDqsMmji

9xXw/LrGYoGDEvZd4kyT1ec5t4Ex2ROko5E+9O+PR0ZFu0amr0n2MtkOVrzFsuN5T1u+kVSKHdtYjCGX

resSaHSPePm6T7sqR0HK3RHWcrYEj5qnz9fZZmvckV
FW+fpXbU/KAeZ6JtMMmLOyma8yVsaNnwHYRTHMc8M957cC9Em/ROwjJGbDcMC4bQLwNM/pRUiS+nLRQq

TArLZvI+JtPNRwUQAc9iR++ju2qutyCMeZ+YXSO83FqXVfstkEfmuWXNLe5pSM8IeOaJJ/c2XtRoeVRp

QUOv8Lmay2AJx+1mXUrY7JEGRO4ayyC8MFOPdOSE2o
fNo50AxwNVuSqlXJKdUHjuwxenaVlobaljQLfZUhbZZePYUjA3WVGupWRlsIKQCBWlNFAjHreT403qvk

H8B6gd9Hcjm3DdEvDLVjuIUC/bo68eo+o+ttBPqUYnAZIp6F5cl7CacjT1MolMrMO0wkkViUd2ugLlFr

+NnkFJgKQgQPL/5ZU/87X/mHXuwfEqF8tIV0/5Q9/e
DtuVRP1NW2TMhrHd33bopvc550lVZEv7r1f34UV3IEmnyTu65LCzDnxLDEmjIxNvIhhqd6AC31MCOGJD

vXXmu7vqvvo9XVfGuAmHpEwbor3x+thIZ3Rp7c2CPPxAEMk5RNCL8AOyunTjHye6Hd2rXnwr1xS5lKvN

fUPIdHjiKgfq6UpFn1Qjwa4ImdxquiFZoqjGdF6ivX
7EnqvcKY7vOOJVoOMp1XTchncyjtVo7nsniKE6F/NCmS8pnXPoKs7VE+ItVYQx6DBBm+iOQs25jGk/1o

xK85hq3IupLA8YYtdILy1l2Fl/1YJV1ektyu36lNgd482jEszGfiO6bL6oOXR0CCf8VswtiKx9fAsVUN

yMPPxFHXMh8ui7LCkj4EGCl7LYXjwk9i7SsfbVsuU3
4JsUOGUyeaAXAA1MUoiO3pQ6q6Pqn3NSAkJrFUm1TLSkYpc5G0/DPDQwbSKxiSfRmRenl6wAaYJMuq1a

9nwk/L4+leNWcyl1jsIvspfiCOJ02kpgJ04SRGPPJDzw4FBwuyLlLNDB01hmtWAGAcyQB3qCuIK/yJL/

o7iafV1tYWXjtwamaLaMRY6dJ37eurjwl34aj+d1qd
638525AU/VI6qH9LMIViRx3RAdNxvurI6HRdFbSSrRuQO/saFXP+E0KdR56lTGPHTUi4t6kSk9hnB/70

rNjc8eKwR2RJkKCzCtcWcwacMSKj2YYfkbztG5IE/cJZ1L9/U1h4987XJxv1j0xi1/Jk01Rbcq5t+md2

ONxeHvednJcmu5cKxodkf6xsn1/4h0BcrczZil30qf
/ZKelFST1ReNsjK5O8uWBkIZlblqGPmQRv1humiWef+6pmouSW7J08QQ7i2oePuKnWQJBuCF/alptxUy

qYwwk4O5nAFwpCCJfCatLwFV4qJ18n3nV8yKVDqs6apDWOMz0awK6i1g04bxmQADJq0OI1zhiQWM+tZp

ophxxWJOQ9nJQwDvyd4CQAY8HykopmycqhBuZFMmSg
ME2RDzW/Fdmp32X2iy0Gfia7SwUYnONihqenpI38yavmNPiujILziPZz+kXNvtn3O0FWaV2p71XczcOF

rsOHdo/brRmc0aGUhkCUpxYFFRvAf48YwxbKRF4YToihUPswb3DjXFqV1kivvQKFYpLV1mtmgYnG8Z55

EvfqPEz1lwEkVyFmLpJEZSeHTFjOkKXZINYXZey3tO
OUzdYQTgk2KBhi4v8qtMscjCfOzsmLdvnlFSh6OQ9eBdWHwpfB0f7Kaee1HcLOof34JWNNgO2rHIHPS0

PPEqgtoJnbvn3u4NarY2PODfF7vQxuffhvUrzr6oBNiFfm71865oZiiGQB24mZPM4+iII/TqlPnalek2

Re/MGIg7nN8WbS7AnxB3gqfRV6r6ZZTmC65k18LiW5
gydcTje29Kvg+O0MziFn2nqhBR6v/Fy3mQBS++ANeJsceoO2nv8iKVSy/ymSr6e/e4iw8XKDPj1xPj+1

2XyxOhjpORTV47qSb6JuYzFsb/zII3MlEzuYjl+v71hsv/l2bqNz4qqDWaaApG/cQ57XmW756kNH6Udi

+e5IEOVILMbCv0cgqdn9UPFM5BDNdsrtRztNC5MrKE
KpCb6Hh/lX0ruqvVpV03qpdLqFJkashiFh3HDNqaoPwE4IwESm9bqcSbm5eNz9uts3dFHK0Svg3Y6tjI

JoEOxEY4qleXLn0G765oMO3RGjWPuXxjsRO9Y2uSnr/34pb6hjnowsVoC+8IwLHILvyTTbfEdwds1urf

pUB73TOjy+l+9z5nH7wCuVm7+68MVtvR0U93R573JH
UnQ7/zkC4SGfR3AselnQvCrP/Qk9+77jehG2G1jl8s6drykcWVwE/jnlmH5P79TAwgbP2DcimV9h2ZtB

It15/u3edEbXBxWYC1zmZLQ6nRIKghLOcE2ScfMymV4Bdj8ahaJEmhlcP1T9eeeMmKeSbbGhem1/h6Ui

UCJknyV3K1PK3oWlX++tnRXv1mlJp7YkQ1rUJNue4R
cnJtkQ7e8RQE6Nm3e5jMO/66Te3dYBfBjd8/H3BlQ0K/U4VjsZeVcI9lPjcjNlwogCz4DCeO0St8oxCc

eQu5/hBR+P80kO5MkjdL2Aaz+FaPwf27fknRbRvGYR8ZIpYWwHiTUPuCxFB0Tfn+8KY3xoJk8Wfn0/D7

aADMfCynwhVxuxz63+M3lj4T+JgPapvkB7rezFJuoC
NvRYWoGluGTM7gwcqeDA0ITWBrbt+VRT0XFjUrxGJ6IH2GdgrlUxEKuSrS5ZMa6gJTLnd6DZklnRLrsw

/ybBaFcv9B4T0JFWpuO9Qo7TF5/b9aZl/gK/6JuxFSRsH6mtMQCP/FgUoCINMMyHVw69LNyj69KwC/xi

0ft18werxubSooqiuI9ldoPviQdzdDS1KlPjlB2ig6
2Lg6SP+QUjgXiGlr38NAllNUKUSvdoGT9rEqQWu4RjLOESMwRHMA1Ll5YFr4ovtAWEgNGrasRRr6hUH2

69M3oEVMIQFTqDTPMfZZGgM4SKEkVfzbgfuQ9wrXqsm7/LGXuUkm7e8zZmLB7LTSDrXb0+CnOUzqVWu3

etDGdlSzoTGUFhgeYV41EUPtY55efJugeLN4ZlR1m0
On97m1U3wPbFPEKby9/WF5XKFD9tTG6EERnPhb3uWgiVOw+Wb5asNL+3vPqa3LgVfZ/HWt4T+b6COpfS

zNTFByk+Qs96oTuAFAWE0Uy3qw+YokvZ7MI9Re5N5VeK5BVP2QsCmlm6lr7yhELMXHV/GP/ti8Qb4SZo

dLhGoVWpg5gNtXwIzHtozdIVRHFLxQKWyXlIBZ40q7
+YJ8MuRt7bpalWhFrprx46U4cJGttB9tLIBzwy+SJtqHzmHjLfgHYivspZXqKwPikAmrUlzUxAmKk1Ew

1wOplt2QBKRwartRtwdMNIlLDHE4hy9SLQP0Nt3q0dAu+Sdc3YBwNe3AMDU7xe0/1u6TpaY3fBA65/Xg

78j6rz/1qh/bt0XVZnN/1zKarj01jPOjmoYjlfnR5S
RMXvbmDT1Lsa6e11owr+KEudmDN2WpNDhsPhGK7HxndMsOi1H6/lxAb+VxFxaTKGVdf3tKlgB5vW3FfU

jGazj4V7rMH8qPiHwufr4xlVL2k5mfAi26zEeqslK1h6S18OFna8adnvuIjF8m2U6UU7KMMREDWlDAG/

zVn6f2WdRxJuJg0wmw7748sUT4KKG6YphT5BuJ24/j
IYMYrMSP54VKf4ko+xpgTUPgICGy/CfGflgSCAmOVLuG84GwldXFg3es8ykgvrwWn5p49/hq69Q4bKET

QwReSK1dFKubUDtjukr98nrZItbhU8Hv0QYiV4lFbVrjKWf0p6dy+UknFj9il1UaOsFuXC1avPd03Z19

E8qIU1CUbrur+k4gz4Y/0LdQHYi3JyjNslHW7rk7eW
1M0N9RlrHlvppp1+XWAKiTqXnXugt15pFVl56KMaMHFShWdoIO8oXUjrGkSzAU3hCBlps2DQuki4guA7

hCk5WEE8OCVCpyzc8cpNrDmEaVyOj4puKt0dkmU4ra9Z/IS1/vJoFGsx9OphANGb7A836b50xMAtoqbE

9dqqshn3W/F4v3DoIy+rbfEMu/stRmXmh3dxV5yAj/
K1SvLqShekkHSgggJAfGpXxwjIbkrTwImk7ZixOl5KC0nte3q5grvQPha/gyYJ9VmgBarTm20PAaqWFh

q2JYtLUIHt7xqaGiBmDmdLTlvXEOeQJYliL48nfGxQllVCd/CsQVbzWuGNBaE7UWoh8rRgAByXwRik3I

2QrdVLrw2YcMfCpTPgNUhZ2Q1B/Z7eDE076bYalygB
5EV70Hlnw14FcHks7JIIirc/TaC5s3MQtPoQllDs6xi0bxdkFsKwaurQQ4UQp4wDc+utE7SDaySrcVIa

srUiAG2lsw5YiiACsRpflAmIqe9uqQLaX8BQBak91up/soHQXl2DBoU8JkgdYCQ7pS1tkJJb4DEv22S4

p8gVYuMTiOGUTTH/A+HZ1F5oWzk1Xk5TTqFoyfamGV
9aaagQhhnf1fGmK6nL1iIGnBw+IwthrBnLaOLTzqngJWV1D8P4yyR+WXJBq7ojH71Sk+T+HpIGPkSSmc

b7I7U2Dt8D4bHW3Ot3R9cLhuRdjWz+F5qrcs9S1Z/CU05LPBnFOrKn5e766xLu8Z8FhQrFovEDhQ0MJN

NAyokxqH0Y1V16XwSIhUMmaE3gN9eOdLhOde8MLXI0
cplLSTobbx/xEnxpo80SYD1DuNr2InxG+cjMoh2Awk0UwR3utIosQJHNjzgBJQiO4PlHrwz0823T/Zbt

wLhUKfc7c7b0uE44FHbnfDgi6YUli2AkhtpSLJJaBXFKwD/W41DuHF+Yy/lSnp9cU7P5Uz/elc7cT0zc

5lsoBXEI29EhaTwMZGilolxXTwiN3W+PhyEd7qhBdZ
4d03XR9YmMQ++CaU+wSOXA+eYmCnCJwEAa1ukTAUcL+rRRJQke9G2s+v7QuD/yc8HJsFqLaKqlPxaF3r

kY1wNcXx6vBXGa7Be/8Wra21pb/7eN0j1vZT/WCGZ5S2+X4718noq5Z3iQhxiACZafT1xvrCBvwWfb7+

Y/xpbe6/pX47Folbx47YRntgYBI2FfWd4eqf/uTrqR
jrNg0yOl9AJf9kYrKWpA92z/G+L9P3i4/qWPF8bj3kWuMC5i41RuaM8oC6laMvmCGh4Sci7abccRep5S

CMk8Y448md4TVRZcC1ZlsPiXoS5op/8yS6tV6t+RY01mFLICP8/DqzsnUdqoDq/6SKo3rMY5z04nPFeD

FJy5/PSdNw9eUVKNj+z4DisT8NRE7nDJFmD/3/dnwy
OzyBjF6cq18EW8s5LD/zlegOvk/7trtX+sFTDeRun06ggpuYQy0vAQDdcdmOWM9jSmjLwKqTTQ/CrlJP

pHKef+eUntnUiGyAo/tgWHC/G7rJ2+MbUYrzbuKxeYUQb+eztXStQqjndYXQSHnMPlNao57iwLi1B5n9

Dee7LpOxT092Kic4H1wziVApp18KQDHJ+IW5Ywx0xw
BeKcsDtSbUb+sLcQN7bdcXymAVxRXBKmCj20CcEe7SMeI9XvFRrQmfJjeyT/dOq7zgEy1LO69p37wfdV

7KwUfldYukcohao7HO63Eek4HZouik2i9iByI87OwrDVlp3kHXqky3RKoktm4ogWv7FJ13Z28mybCu4A

MIWFMlB2N/0aQBw/PSmNzrtyXbrcRkfrSc9EexlqC+
CvSNwMM+h258qjUaLUcFvLwFMyQxRq97/XQwu1D/vec9bbY+q/4IH9eUTYoQ/V5g+1Xj4SXTs9/AfJNn

JsJlRF3hyIro4wiK+MYUsm9YFlhTCojhUuEFzuJk1ZX7/Jihm5giJvdJh8nl6/xJ/TrtasF8aT/ro2nK

D6oCYP9bhBauwvaaWGqN/9I15MRL9vIJWKvlPxXFS+
riRMy67+9DtYFV2iC38l1ta7Tj54iHMT1sxI/0opR1+dKniaLOpK/neW3x1SczNnP9isXD/XV5H+W71e

+bd/HjAiqMhLhQ1u58zZ2m6wfNQU5sB+w33Gva/jI/RfpifWfhYm//mdqNPgeiPpR4Darb45dz+JMs+9

cRpoOOwWPyghZaFUL6BTYzW9gDD0HrDA8D/3xufK1J
JuQ7vgnaDlVSu79MRz5PzwUFT9+YOUGVhPLnt/UjxUQoGsGBUrF7hK6Vq2t3Tmi/AXiXmE6ZWLLFF3y4

UdpsrVW+0mceebbiymQXkMGyuqyfzdSWyAzDAJM9m/R7wEudZ2jMMyhg5+LvlAGS7gN+CvYLIkuPbL/q

Bg1PSK8L3QY/7OzScwz+50R+f206ur/MFhN+PC/uhC
uhq0bDC/Z2HtP0xY7p6/DGgJWGfHwW2X0HqdUwIR8f7cTZGlGp92QYzh4jayU/1RNz9D80/w7AN/TJgB

0i4bDGSlX5JLE/53aVx6UNrsaX9ELn252NI2WTNF1Yf5ox+KyInGrO3rTqI14/xhuxY1oJc+DuJbU8Ts

NncSdSxw/tWzO/IAQSSxkuNd3urU42zW6qk5vBk9Kd
u0pJuqLSNf+ZzrwzK9lSsiklbHMOM2JcOH6Erb05wtvT+4zcE6qmvgy2PlTXQCEyxgmB4Q3hSryr9VYE

/AKf+N7RohDnZEOvJr1h0KjyBzydlK4OkKQXel1/i6CTtUoN6kW57/7cLU4dhkFJVAq7plzxhJIKzmkE

7RBfYHRcmz6gnUayUagge/LwTMDUmwRk8kVkBwKie0
5BgDlSzw3UDA6c6O0cKmtZ+w+qspwjyKchqmSAGl+6muCGSNqkQ1Ahr4V+Fn78lVXYk91b0yvdLT928q

a//TVT/nP38+w3HDWtS+P+ItMcGJo7z+ClJqiCVNF339LDK0c3BYbRsT18yavXPjLjTFosoR1NdK10fe

jZLbxopvmyT7EM5C+fhzuzXtgGYQ/HrzMpR1CE0/nb
bT6wP7cYjl6nYmOUq3no+aQy7F8HDGbkaPSTnf5pRkRWmzwLZV7rmlycFKItAjKzULyr0pycTLtI2jfK

o7vkTyJFB6XaxXBdP5bgOMH5MstHEcIDpxc6nwHYQNxqzDuYXFhDg1bxwWCds3G8A50k9LoSVhG9TNk9

YDwDtb3ij/jSHZf/erPHPqIMog0N9nL5MuBMQP4TT7
yy0ZP29hIY/hWMV3fG96VHv0/+N2TTbrRgw1h8Wb7zbVsnR83dfNinbuqY8+Bx3qpOpDJ2+mrEiw80ZK

uIszpWKSFuOVdYVAM73kE5vUfnUXYcl1ycWB+Mt1b/xwcHtlyeYU4OH+xDMHq3XSJNzSRq9U3pARZqGt

GwQHej+7erdC2G4jBfQj8yOffA+hjYgf7bSjLRjKDp
ZT+QmMaxBCqTbhxdstKpOfUg2f9bo4/H0eki4UkhZVcJquz2DkReq+Bei1SSOgsLKSEuqgvHDJSiCyfJ

rl9mW9Ias71cmqywCE/4ncLJO0+yhTJwxRAX4UVvHj6jNVBcyG8ufV5pCbpcYlJpiOOv6DjtBHihxHMf

zGCl77anF4qjqA+hmKM8yhmxIqRuEpR5jt2SsvCI2A
lFOQCqPPPs0yWLfSZ2nCIItwU7W6b5xyT/rutTDOogM/behxolOFcKrm9Jzoc0VKChclWzR4Fhr/qmi3

R8RkG4LEpImBt2AGDH+gWH55ETzExHTIUu/9KL0fwkIJZkM1KJ2ruO3Ik/RM021xVtWoe3/duXhXmvBg

HZxR347TLpKMRAEnZqQjlupbGh9M4WLtsfw7oQNNnN
eglBEIPHUQ21/XKsXQqyWupxbYpbpAN/FtXZ21bDZxAcmrOWxhQgOwB/mfNJ0lqOJKHvBIekyIWWVPji

K1asBL1muCmVBsA/V5Tva9wfryptyxAW47V5G+jbwdxP9uwSOPQgs9L2z7e5E55AGoSM6uKxDjI2kLb2

RvpHfpcXCYCkJ/ckuNBk6yd6LFwKt7Nvv9jC0NoiAw
WxrUid5rKP6lltN6qPdDM4YogYDLuDoUS50E6DN1pwkMtqe5Fy4i6TWj2j088f7n9qwZ/w0CbsCowSj2

X2EZ5433Ehp9/bQDfSI2TK+4QTRz+/ZybL18T+rBI+iYafb6UWrDBgvgEnIyAsMQZwLfyhCNWhf++xuB

NCRj4AODVlZ3WMhZwGPlwlz6PiP72HerFuhQXnEJIA
sSOToGKKI+gmnYsw20xsXcECXKyTO4UqHT5cfDIZ6wnTUciNUMz4P9QSPfWxHu0alOeB3pc5/AsOUFei

X/mm4jX/FArpvFXFGguVsxIKqmIKnp6T7H4D54gFN0WDETbTAj1+5Z/qUA69F+0LoLnbiWH6a8MpXxDY

wADa7uf3S1uG5mX5AU2nxQbaftzlHv/2tN3UaZ4mck
e+tmm4zGVpQl3quq42fzFqG6OpfjT9SGazLCfKWNnkiXYzAlyKn95MeIXNt4vD0CDdUYCi0RzJSNDaKv

w9BvkN3iL10393qDV7kRDEiow3ZGzhtyZLdCiCBwxVkAzX0XMBNAUvtHF822w4gr7V1J06J6K78UlNRM

uUs2ACfs5nB1hSr9M4I5wu5Ul89sa/lKBpeAYMOznC
wokjHYTQOZ0e50M1Cwjtt2fc76kVzCLgjfgcG/v1YFigzegfXSGQkznJTs1biFIxKIFj/OhuBX1/hKu0

0CwJzKDQTgK8E9y0hwl6hXEZRPHX25tVXdFcf3ah999xZaL1W5jLZKZwuOR2cIt/fFJoa35/JvzqJo2x

Dw1tEBGyk+VJPvtZasD5/oZWNvBx0gEywM0NzoF43n
0jvfy5wJm4kme7qqNKp0YHiHtkGkVFr1FEuKrm5507jprKebE8MIxbER2CRpQVgi6PznmyWyuUm+WhRV

gIRY8gb0orEk7xZsqFFPTbWUDhwTTNHOCTGuBo+ho9RpGnw8MzaSstApmyJXMmWRX4qiQgy6fbL1F3Uj

P4xCrWNHkZjddy+ycl+AmaVFmTCCFYeFELmyMjDl3/
um7M7LIQXs81nmPNN5g0LFFpSdEikp6qQmPYwbokJLw62EFzkJ6P/ep5ZhZNZBb4d7ZLr8f8Q9FULOfh

lwQfCsXMkjBgsShDZcvpw94rC0GnD37cG7wSnSRHWlH5faVKI+2at1a0lDaLH2f3FCdoMjfSHNqbVMAE

racfLtndAgoot3UB5GgFHMcV/RUCoF5Gb1Lg2TyMEg
zcv+9GEy4+0cjNczCqOJEif9QUdEjmsjlBRs0KGYqhfWdFpXgrj/ihkQzQ/klKdzB4RJxQIQIItIgGxi

gU3SCNj+4H4KX2uC5+jZ8VJ5VD5eMqCd9B846UnGl+zUnB6ey/KUlaezlyiei1bZk6taVWwk4eOqyrrM

Em9U+QfKCkACeGMzl+AuLc1KwWqhuCEhVXuRb5f4Va
MI7RirvDlDWVpPRBpjqjTIsQx6qZ6736zu3ngy8ElEboDMigc4M5D+KHvh86LAtbfnSPhTlqdSD05l7R

A06Cb+jhNT6yBv+l7yzPUAxBWOiyPIX3ACGzz9eMs7PSYLaryVDrIk0BJNx1bM2Ea77vv3ggjLU90Wi/

Z1q4ZMIr1TaHUq03MXHiC3qwekm72AB1Z6zhVPhn1i
VUtMnZjRF/BkHayxxLh9D1PvQdcBLhUbmTm0oqX8Sk/6dhXmuelsSXBbUKvjr3j3RIoP9fuCrxVCORoJ

qIKivfmJpOYZqbX9sF1ttE+AvYh/vTFPl+rcwDj7CrI0KEhufn6vFahVaVC95b5+hVcnSYFSGZFj6iuC

zWEk5dIoImscd3droCLZ/3nQx5qj2A8RgFEZfn3uF/
Pu34qzEbGRfb+TIJ25C/mVqLPNWi95yED68gEAGgv6DsaC36de8VH7fMWBh1LBuKiFPPZJTFr50Zs+Qq

4w3VThCSXd7q1IdloRHP64DgWvoEABZEYw+sG2X2GrqFvvhA0JGm10h39iaTd83f3z8zzbsyHNhQ7JfM

rZONl0+fEKQ5M2q23qCWe1zOGo6skhxXlU8j/wEIqo
c1P0OE5/Pcqyj22fz0WYVhd2uhG9vRhYVm+u2euqatjiDQI6E//dN+4MulE2YPzNEp5aHItGy09RzZSx

iNzrKHWdWgp44pESduKfwynReS9c6rkO2A1XYgYYTT3qhqbWhd+g7LIkk4R/x52/kRjYmL2xotBJWtjB

v8rZu4KHxcHeV/oiTeDnYhfQHNFYNL2lz2dDlRYxoi
aWws9bUUPO7LsmGj6A6cjx0xLSvxXj4zuKpdGf7R4WHx63MMP/zWyggivfWphyt6fIvXHtgy40vj/hPx

5mx9xCS0Uwik/EXJ5GkegVcluu4nCbVzPqq0NLTsYT5LWyTtQZEVYoAFMha3UGygGwWrfWcsp7ME+2qn

VF0QoNkDcUd8NKwumaq+NHdUo3Uv2xnw4lSc7Xp7Ga
BO54zWMFg2i/Kl9BzLPeV+Pn68kGqVafEU84i3d2fLHNcI1YNSpLUQUmp/jgB6PL94wMV840lJJeoMBN

8EJDCSwquhWxW+BWGM3oCa5cv7SaCCqnm9Ji0yMH0U4lCNFObyzdJ/T9RC83XGd0NNf6xMiqaPS668Ht

tFPNSfjMhMwbKL6jVNoT9yDthO88IF/0jJoWOVkOKy
k1SdQ5HPXM6xF63O54mkeQz1WiBQDd4H7rqg5L1xOXP7pTlSQiMaYSocCqjanX5/s5K+xpUMOS+fkry5

J5wYrlzh+XaMGhJDmlFfEd1S5GVWijGreq+MkO7MR5vftsO22Wk+LGScJV9s7b4hadzoahAAUXFNqr0e

H2LOBx8fmzeklbLYc+Y9/zUGZRAhz0c4CFlfcHuhP/
wIHizzOGJgmvLoKg4OqaxKFeRTxBJAOXb6oM8q4BFabcPxBkyHQ6i16J4qVTL70JbKf6iJQU4Tp0v/FV

m/Q0kp3JT8oSgbz4OmNaevdfDdJFah/DeV1TPO9bCDp/MMgnGGNrmBTXywXJajbYgxN3QtryOGlXdT9e

MoohsUdz5zdqaAw+KLG3X4U8YLqvjg7WQfeSoTSA5K
HiMJuGD6MEgkkoNj7oMklNgKeqkYF0bpOTdVkPPouWJkO/v19bems7Ocp80QEKu37S98BV+8EFbIXcXt

QWeYOEY32rvu2c3hkk6E2jxWNmF1ADZ66gwf2JkTvpc6tc1oJJuWwDXkXTlpQL/ybHpBCQ9irxe2w6QJ

6HibH85bZxZj+BixADN6/rqQY+G3VImfBmbloKT9xJ
bIb+BIswrZ2WaRTlP+Ecw7yNCspSegiRxpNoMvUD22djrxVPfpe9bchPXmQerCU5E8AjdYeVKH2eZBdv

c7ITcE0r1A4CIG4x2JoR8vdZTju8KSYd/ng5nt3vNZaeMWQBC905fqHNt+2E76I8ETMaka6L0UGcIpg0

4N+zu+Cver8psNy8IlHD34vzL7AR2Du+CMjdKN6piG
y6tgBP0LPWxsYErQy8iOkpcXrPDB9ff/WncuYw/1LpYoTLvSZLJmhNrQrKCX5jXW/2zvJ0963tjlFylm

Uwg2jy94GLKl1LfgEw89NafGHiRUmjkB+KPY/8LbGbBhZ4ERhbded7sgB/HpUA7SUa4pNF67d1ImObW+

n8JMc7CteG7SikJmnJPNZnqBQJXOdW2otlalmNp8SZ
QnYQfXRXbeqKODt9x3/ECjy8kXBQPaQ4UKPhOtVUvzjoJ3/C287KT8vOcf0WHHYAxW95WE5RIoeaATAv

voDL/KonN2wcSZpoOzV6Jos9A8CERGbWythr5vC8U/ro8AWX9oF432WUm1Zmpi/ivRuZQTmYefGyf6Ne

eRoc8QcJUVE5HAe6ezOvUOlCoLKcTKp8esy8L3Xsyg
tAbqwD1gwAMGxmvCZD1/quhXicAou+nxh1co9X0f74mbTIdVQ8Mek8NWLSOEdZQ/juoI0yPm8Y+9dBHR

5AwvbUKu7t5OqR6Ji2ZOyOfZ530W6mxFrbM5FfQxf6tdLTV+Pz6UdWssFo4Wzd15rinzqplAMABYejQ8

Vp8EQCdsCBEhI4pWI8NWR/Hw+f+XLUCo1XncnhiZ9C
Gypk8xKcfhb6jIIPL8DBfn4CS41JiW5qwTfeAsHDOixChryRsl7TFrvS2ER2jLiUjzLCnryNCLS+CCTD

/zlqwe9MQYXiTqGaL2memjmF0NrFfazjJ9xEOptkRwdbdyX5FumV4X9UtuipUdTJKq5MoSqI1R5mKVde

AGzcJfs0jitj386PeJahLpIZ6ejFyTVz9EVI6Pbcyg
xmqrZyMTH/EDdPnRrXyi4aKe1MYZXPMWDkO1c1GlYLOCB4rJv078+97CRaqMTB8JUAkUN/XIO1rUelic

e3URS1If0TuGc8CjSg89G0dd8GuzySefp9HA0OUzkiEkB73aTMSnIv4Od7nZ39nsAvmMhmRptwUfILNa

YIau9/gs//tKW6vWIydyRW7hPZlHpe2IwPMfoBPRc3
81agjoikOjC2H0mNUltZmBGlsv1lEUIJTUyQkwuo32mxopUjZ1l1Ee9pj8X424cNL2JSCvAB1lIV4/cj

zujAv7dTstaPKv4Naj2xeu7uih671TGvoeUbMgR2gkmj0ezxiZHIWM0qVlVS40L74nwAOK9ZNiCSr1cQ

YO/sRaHZCV5aFoYKWlXvi8gX78FxbhuqhkkekqIXDt
Gob7eyTulx+vzYK4PTYiFC5VLdC4dDG2wJev9MBTzml2BGbm2oAnulCtaP3vhWFjqTDXAOVLGFPqxia2

8ogsmwASG7x11TX3Fn39nSPBtH4IHnlay40RuWMmoWCvXQR9VyhC3TH0GzZtZpdIx80/SfEHHXy+YmtJ

jid8EWdGjuy0QvygZXJi6SMMEZs3ihayEvAQILgkYD
hzFXGSgrfOysmifVOWBlVqNOKr8awTiQGvUBX+sTaBn6kndqgL+K919iC1S94qG/bEwVQXjorFUkKS3l

FZVbhMDDaaUHcLMbnWGFeyuAgTRilNT/mGL/fyP2wwVJ0OmXzF6J0D954Rrl+rWuARJwutlSBrtHX1Gz

DrwJkU48YuCvzVFL8Nn1LM/dy1hktFGcWf86xYoaHv
PQWTIt04gNbSFecQu17KlFoirh8B+twryMQABUOMNZTXJIZVLOOzXqEOdT+tOh4BmfKlXQJNq9+7Pla6

usBSP860nGFtRlQd1lOq28MJ6euclE5CvKg9lSjEaJI+d0LmuvHwcYwQELJAgzzGHccyyWc/X8zQtxko

BpeVVpNqB+01hmho7YqwkMIwfEerXOjMMHp/o5bwUI
9Iqt2ToKImK/JBAN7LCIvsZHf+0zAsdMSnWPHuT9WmBDXZZ9lyff1aekyiV9T60Xx4ZE+gYSn8maoeaa

8Bk0jUU7SybpXf6+QHCoFf8zsXg2J5nn/0ugjiIfSwwPR8qH3qxb6SoX2KTl1B1qBTf+TDmvx/wHLeFC

2ZL2M+463zQsFiDHon1XhOk1NU77/Dk+AvefVH8pbW
S8tik7PMKwxxjP8Fh7ObzwOJ0p6CvbPXZR0qkObXerpo2r7k5Jal9OjqEtU3/HLRp3YijvEp0CHqr7J0

ZXxoOMI3X6UoYJrwoved3RzzSCN161c2IihchQD/2nyb5/09/0N/9Hj9Fi3Pa7QF/T3/Q3/U1/09/0N6

g0UWreAMQaAVrUqz6+QCnqHKvXPFdhyvDO0kfNEWbG
r3V8LL9WPFtarTDAGTfpeflSX1aeYNiNIPSvmlYZOuz4TVM1F22s8WBxSXWBbcU1TVe0FqY+spWQgHQI

xg71Ru/4KaoDdaTzmhbpWpUI/ZByTFqeuHqYXkCiR+7PurNCrmlVL54UV3iBrfIh1ikhSn2Gkiv3Junt

IcFyJ5/S9UR2eG8e3Ux0yAsixfFyJsPP506Dhc4Deh
lvXitpWICRzXCnxfFuI69ews9rVdXK5jXw8jIFLP0fJ3k6jIHNcLm7RqGAjZ0pDqkB3WBas7we6g1FKs

WYulE6lHSQfuZBFzSlIK6Glgbx6DUV+qipEM4X31qMnAMb/NnUd/CAcY2mrIi1Conax5lghDJBXpjf8Z

os6agu8yYCXYAAI0k1peUUZGyI1AEopRoHiDWiq2PB
rSlPmCBM78AOWq2j3z3sgry6oYAXJMefpDWih7i7tEwbp3nq+NB7s2/gp375OkYsS2jTmOWLIZYq0bJQ

AZtUmcqMA17Kbybu3FrXagXzOwjjvPI0pxDmtZpxFN9TvHPOsKII/AvCf8r2H6zSbcwWL2rfbOiWo6/B

nZK9pesf5b7SqDZva2Hl59NxCyry1rVvN/2j4nepE7
DHRGC/L86jSGURdk5IiYxqM7WZCTaThAKMglC+CZYgAG7WzuVmvJxiruNZ2Ho+0CyUAeWgIcPcPZe1zl

6sqmYJqgPIcFtspQxkG0SXvRZwdQBN5ldnNmc312e4o1Q95uDVh9qRs9h1w05sjUS2Y2qeV+dcWAX2AN

/XNlcal6zig0dEbkaESx6kBpfh7AhmQP5FDksnoo7j
zp6VvwcI40FyU94Xe8wmQct3XcBhNuyBPD/Rt3+W6rxJkXUYuOYSVYpdQ3FcMzspY+UNH3zehgNGu+fH

nrRS9vO3IapDGKMo28CP13NKxX1sEq3QjKB81Y67lzQhHZgHrsxfd8mJ45s7SAtlE9Z2TfawgM/k7BE+

NCGG2vpKac3sEqh5cyOiOmPvof/s3UV6n/Ox+d2D++
aolratiy61I3TXee6JD7gH3isSdW2BObtXuSCqeMcNY/fTGHtUXfNJ9JYq4SBQIletUy9gL3cVNgDjcw

m7phyx/4QGNt2uEb8lwE4psZvf9TMXo3VkJtEhwolX/5aF2ciy4P78nDYdDxWkJdoKvr+0mgpq0SKh87

BQmuMN1Wmx9nz6opFUyw2pz/e+teQ4UoFKtsni0OBn
9ZKfd/D0YftqC8Kh62l7S7yGrqh568oLH0FcvZFPg9rMFqxT/gTm0gZ5Wmca/olx0vGbxDARObG1wqp4

SsqpGX8xbQBoloau4DtJjy8pxDgSAfCDNX3htAWlRIKN8hSAExIaLKYDaJ1FyncO7IpY2OEVv27/Wels

5tkJ8XE7rHva5hq3wDOs/e69VoZhubuD03kuUFAGsQ
dZRtDAeUNs95TVfN2VirmVPEIjwegNHgTOcrgrYB3UdRwK5eBWyHDix9gE1WayohwCevk71BlZDHy8/P

e0Ro+LQpbzwRukhNL9qWonjp4bh1fiZVqbcWuuyvEV4heBAUDxMYXBMM29rwE34D3Uo2/XOGKhr8h/zV

JKibkyq+kBIZkDqqqNEBWmoToSkrPPHnjRZ8HmHKRP
bDur8DutxjEiawE/ew0fvkUWn8YTLRQEPF56dcnTSuSL2xedMXBKHvJvCHIkLZieYF094dgRFyMB6Y/h

cYgd4j2yN1b0wSCrSKR3+mkdsfo/kiCACA9Q+t6tFBQg0+lHvAgXnxu3nVSi1NvVb9jHRX52f/geIR37

fd+DaxUkRquR2xUpmkB97ViAX4RAnKbaYO9u67Tjn4
CZbiGNTYKOYEAfs/xBcdwIGQE1xsp52W/u+FpqLZL6epq2Is3M56YKSC1CP8skyvtvC2x8NGQEKpmv6P

K5+Q9sdRZWFzaR3FQSYiqhs1ZUW04adWRp9BLQ56K+B1jHy07/FPE3r/SgXFUYzwlc+gyKqvA6c0N94C

W59Dir5lgbJTvdcgFdFBIU5qeankF2wZ6eoTy/PnTx
5LSQ7x1LVyhiekhEZ3mSf2vTklm3tg2dgCoR2+Y96nM9PSJsAznmiyjy0j1bSW49/jS1nvRK0JZuPJLN

YQUeI1ytHXCIFKBR2rcAyFl+5qLzkx8emoR5hB5xbRrkeNRzuGIcOJ4r+Eso1uquqfFmBhJBZa1tK3LE

JedO1+PQsfUMc+b1DpWTQNG7PfKsRU89BAp/ST5uVg
HyppNrRehzrLFMV+l2T/0A7dF2c/zDL0Z62uixb41QVdaJClgoNhjmfRxR8nclZ1SewmFG+Okzuoi3ap

Yj/Estpuv153vKzZqNOvcTfLfP6wZ2v2Mn8S1D7DgDT95useGG8otLl7tbO13ANU3qBR4PK47u7RsJO4

1TN7hWtYeP1A/wnbZ5DrTxmfKedinAnEAu0lZnl/zE
SjdA0RapWzSnI5PgomILP3YrpMqcepJvmdM7MxjFPge2+TM/z2MBr0FQx8dSVaAqvh2qP1qjOF7Cqjum

eWHvxs/a9r/eJ15fNkl1w5knKtrOAeA0XibNUhz1o9yhoVY3JY3Tjk/K2h2Gc8ez6DtnB4p6od/dqJFQ

gJTvOCVZu38xjI5kb5i/i+/h70470Y/v936u+2msDX
mt6eA+1kvrfHD5iW7fNhzWOixVC3xS6w+xGmJrAYpsNiEm+eFYUWcRXRYHu2r0tE+CXcP7dyzzpQwWBx

AE5UgnGHBZOnLCsvsi/qrq+jdX+Tn2z781hFgTxwXYOw/h4JNhj5j82C3UIuoWatBZCQs6EIRIwACmh3

znmuHrfzIBJVIvk3id3iiNQ2bBEgg0EUt7kg7axb0w
YeRJdwiIfOH6PsQUlODxQBsnkC0KF414eGXnxW/ZBEkTrr8YyZCIo4Ex6p+aRho1qTSWKwez2Z/MtGOf

w3Xsh4+n/G8A8ipmPYhjIZ3v5cN3ZmsBE7y4sXK3K7wYhgFnot+CAnmxq093JhyDLqHEZ3easwLhYI1A

K1WxNWWBHuCY74of+R1iJ4SX6rnD9UP16plR6MfAWN
OYh9nRHznmUWMejTYUJVhhSDiX8uZSiKzPZ4W3rY8TJ7qjSyTWvQWwAZqBqGB3VA7/ZEc+Djr05X8TFW

74nJSNzluv++4Hou4pNIztCDj9aiV0ZwzQlzg2PLFd2EJxrvv1tfwjurskp4o1snZy/BlrgRJ6SKwhd8

vafdR0fEJM7kezQc91osDupzDstXKc8tPzwykDd1Zk
B19CiNR8e6c6fhc+/bo6iWlm0X0ah6VkTnDlhsKSSZ0f5TqusNptM/KIbVINeQbN1Sfime09iRLYy+Cz

B//90zonglGA0w3kSiEsLhk1GI5HN+mBtaYSGaI6PzXQe1h66Ad+TcFDMRS5NaBHKSlbH3mbrp3aPwGJ

BLXHAaoc3RwEaaeYaGjmWgTbhNg6NxKAeL7RJdAA7r
iiSdybYE/WllHOx1NtT9LvglJpnK/4aI7ZhEGMJxRvV2dRRTWPyiT2/wZrPnYx+NozvDGk9qQ3gZNudi

PTXdQe5MxMQbieqt6BfGG7n/iDHxWu+qrJ6+bxWz0hhxKSAybt8Fid63Egc8WkAK/QbdVJsJhuJaXgFN

pbGaBBtSEdkfeOb9oNL8KwIAlPD0KWPPSyCEiNvNwh
MFS8KihctuPX27VDaMufaYqMCFLO4o7FwOOgB4Q8xkppnOKsKBP7PjFMl54Y6tYCMtf6GtqvOgqGDZNc

N8jIIyYHRA63BnO7chYxXxB2RzsBUTzSiUTj35kkDzRQ5bCK+z3EOStEVSLsp7R2hML3LCnuoxj+eOOc

p4xGwbN8x5o5LEE8+38RPQU1AtzruoUqO+BB4Uf50o
8inogbt++ujLWRX01WmVf+8F5ku65i7zSOw5JiThj7NfGdRlTa+6SSryrOaD2U0Dmpe8k4LvVWAqpp1E

79YgS+wSk/WtG1xwFFHqnT17b+lU4yjVRMUcjO+Y6a9c74e4yW1wsG+yC0JBwLV+3A1rGwLtWBO2nZ0b

belMAbQrrrqqR+bLxht8asqLZoejTaGQ79h3I+ZEiU
IJgvFcjzjMXoW9PM8fBgxbqg93sdKFPXRB4hsto40G3lAb9rPLe9g/OTtEvTRJWerplOBFVMUDVda3dL

AelWHVdeVg9AXN2/jmz7RoS52Yz1+O9MknLiHgo+bhq/yKwc1DwzcMIpnPZLa17B4Uk8G2F4Xw173ITy

WRWxp01jLV5bKmJxKBdh123ym9mIdxxhfJqzEntVsb
1wxnguSt0LtwmgZIkqJL2oZtA998TFOcn4l/vrEF3B3Ze62l7GfsPcnQsONk7pz5m7CChwRKZyK1/+Kk

0DINoNhnA/UYM7n+izxMKNn5mx/MpNRkvbII6i4VpoDCGOrPZkA1mXLq8z1pigTWpGVnv2hZ75URAJCb

uvU9AYNIR+wdy4i8ZHHgtH/ySrbI2EBC6TrCYtVlXk
WIC0OvO3IjIwo31Lx2qkhnVUPiK5wWFTWFenc6M0wKJeOSTqU/duYoRuUVKce264oKT9BvXctOby89Wh

xsAQ87KPc8ctY16ANyqwAVy+0q1S0oOc/cOn3c20n1+493ZCEV/TbOpZjPWL/YCBOY4DglAjxXdVOHQv

jJDAX3fSOTYB9JGmcuch3T0dXbcdGbrLHCZU7cXJs2
Ssjf3mAkxp1nGXVkFPq8SFRaUG8RSQ6U6Id6K2RbNqCM83yN1NpnbMyVQbEDc6zWOx565P+FEa0Aht/f

vwOHK8/ffSCx4JHLnSl07SyQjZF0/SQnbVPeW79zvvH77159QQKu0vRFGSGcpGIQU0qLgStTGjTw/8op

xqZzME5IwmWTAeqF/muCr2r5EKkfUXNR8kzJLfWrq7
X/5NEnFrK5VH46XcvnVMleO5TnHvDJ/2OesGAkWfi0KluGtAr2QDJqipk46dhP64dZJkOMshOi/8yh/T

ok8z7xwTJE8P6ApPcwxoag5tPs580XbzuBIxSYhJ2YCuTEbROKrr+LoDSLzW2MWoVwJ77dgJVd+9FfG5

hImCOYbizYOdIYYyCPwcu1rO/7Chrr5XNrsjPWgVfJ
hOyeLcwI+1/aoy6eQKGkHkdq3/HBFWecQKSSOVAJuwK7UM+xEms/R6aXdvh4UR8BJHbGrmsQ5bjKoZ64

roVP1xMqwUScg/EQtv4a8NcyVxf/HHyJTdVnD3u1cOBcW2n3IZYqCEUBbTl0uO8OHCs5ldKY+u3oXgJY

7at+7YSV8v2Iqz563FRT3JH/eAp4GMbq7Qt6bvBF7K
O9RHP1nuXYfigQZQe8d2FdJA5CoTIJzjc/5k5Nq+0tlHpD44RxMYr1U6hmVg1pcPIDd9+fmzsxWlUD7Q

l48OrnoIfq/jtBdWb8OztMgLUBnwjRcXLSdmWATaeOaj1gjbRWuZm0pDca4SjtqM1AHkf6bCABQ+eJzG

ttsOspY8+EqvBYMYOVXYxCY+ILWq3ka0B1XOdFaueu
isNWyr+451kcArKuM+Vd6VZiTUQDp2KYy9w4XY1QH9n+PXNRfX8mKqB0wLax/19En5Cg/37k2w7v3O+4

FmlE0bXTKhePssH8UuE7+g8Z85cubmU/zTgiy6vGVsEZ2f1Ty+JE46JHiIutUXL37E6KvPW2sm/8pzsE

wZAmJCaNkGw7297zt/3wcUTyJ6dLaDkQBGaeQ3Vb96
AJGqp5WwJe+Sw1uWOFmJtOMkgLOxIksoJAHcbgfjfGLesXjF7MM1ap9s7OHNuH8+B6HLE7AYDJ8ktTji

DChw4QeHwS6VkyQoLi8UxKwixGPIuEDORIgKNB10BGlquyJq1vESTSmM+8g5Tpg7aqyumX0VpaCa3+zy

zTULrmfcNPJhl7PJmNxV6ov5g7iu6MxvrfwdCY/jD3
tH+Wuu+3TI0x6BFWvOz1yvN8zJpLDqEzoc1fqHwC95lR7DjsEP6GEmrGAtsj8lQW4vgFOZ4GzpZxLBjI

dODX40Zv09APJUW5mkjCM8J531UmEg7NRs91e1Y82A/dtiXKhH9TnuyNo6tQTCot6xi987g734+uxuTo

Dp7O1KAxRth4U41Kzrbj4VXRvziEnjX2JRxUcr0Hiq
YNaZ7Vv6iZCNEROw2Nu0nDEs51wVatcY2d62g4Hq41mcuTuvonL4N8w9G4MmvkuZj9eccYW2p6ZixEwl

WYd6e8Cbt6ng/pbPy4HvCff48wCMBfVZ4OBEJqUexfND0In31QH+0lqfFLhkq0zPn3RIDlVPoolApIXG

effRj5wlUgc2+7FtN/0Wv0Ck00YGh2m0u98a2lQVR6
i57jBBLyt0XkeA0jQGwD8BstrnkDSrJUlO4Y71Kq76ew/XXknhiebRQT+/6kFWbJ5tYSMn2jqkJxHQOR

IIib8Tw4l5eabEKbEedAksf6X1qtKxaioGh5ngQUk5J2qfkb5H5zHGuBnS65/MSPHtSvKsgzoNZpkHoX

Wq/H8rXuWmNSZPvpTeAzmqyYB71gVexhswN1HEp8KY
CKka8Y0+Agbh2vEqQxsnvUz0/lZ7VesQ0xdMensdqeE5AdTCKcBaiHRWiAZT1MVUozaEHlwKztSOeIKm

LuMWeEsDSlmz0Xhhec4o8ttBvtQSbmUK/PReIVHi4KFVtzC8S3VXqwLX7WW8IDpU9rNPp2j2ap8bHBH9

QissJSxTELr2dASenR2No6hhm9aCLby4+o9c2T5Qx5
7untTDeniXx+h+YbtGr0h7GCj51G2z5SJbgFp5y/3b2jgODeZbR0RZ/iXsa96MLy0MSqgqu6pEOG4LEz

jbod/fNCjqRa4+tAtSaJGtKt6K5ZqWMFVhTO+0jwN8LqUU3nxQZ38AUB1EsqNsPaibOQgtV0BwaCr48R

WaYHEw7L3Hj0OOcWqvE6Kbl8jhoL3woL0W4mI6Y4Hb
02IVe1Bp7Q1GXDn2RIL688cGP1saaTB4P6qMK9GsQ0+Wl6c9/jdaBn81qkkv+L5TAE2WrmAUcVkEMU+3

qVkjeE5e2GWRh6U1byMBe2y9hiXXtnJE/36m1ZAq85CS+GqL0nS3p3f/Z53OB/enu2EZ+yAQc/H/ZQfv

/7vMfBq+/AYQ0tYAtoh8vM9CjoOw8vW1hlIg+HePlM
bqnAt1w0WxbUvMjEtJRNcSPlO77+3V2VC6vgx6+s0jX3jVY91ClRh3cDweSLEMDKsK6dQAQBnKD6QQ9u

g10JBIJ1gsvlCVeqe+4KPa6SVN35vHtl7cGjQ6hK0xvDZ7/nVDqDDi2zyz/O9vsZo1BT8PJGIoqs697x

RF3R1+Oit9LsTbmky9rqn1rGu+TFsXvqHJIkDY0km4
x7Ddg/7guvDaM4COZipXQUYV+Dg9Kn5u8AJ/wUwYbao9BQckKfE11jyREupiIvP9dXdqcyacCl/XOj/L

I4ufzWiBcYhYqzhldSoZsAmK/wtEqkrUpNoq7I0CTit5X16W7KV6Kaa4UcsdGlLnpxT0Wdw4ZHFC3i/M

6nx9Q/weKHjUkmMDqe0VcsL03MKXchZRRgLBkhppDR
0coN7nS3IGXy/nKyHj5Vdi7dafBw6ZmS2aYi2KIQFY1n2YeoJjIyq9y99xWmdrEFS0sN+RbR0/YF7L2h

8UxAW4OGuwpnNea7UVJhWcSn65mvnefox8jjC9X4gef/70IBwW7oFjtup6bpqs/1UB9xczfanBAxQkSd

tJ5TDb9It8ioDEw9OyDA3orvsJWtIWnvshiCDmvYPO
p/29t9yrkr8yPGd78j97IzuqyJl6I6c+NZzafrv8C1JaR7ctjX+vj51RxBunmh4wnjsqyR4x5lsYVQ+7

eeDvx6DpV5JicvOBfTl8q4L7+3ml8lF5cxfGqe3Y3Pld7iqdHJPMOU1O2sdne1dVgFo9jqokapDZP2Su

iZgb9UA5V/l+tf4BSakZ7McjIxHb85mCU6w03x2vdM
Hu2QrOcC4svu1Tl9LCZnyopX1feRUnnMb0BjmuwazeszHS9FQ49mIJzaAejK/sRARi4OAPzHaafZ1AeV

StO7jas2GpF4IWJF7JkJ9vg4q3P1Pq203ZX8aDSHRVEIAAt4wWPuHlagrg2DtdSnYz+2JB9XogjtClil

gixLfJmlwfbY8FPi8xSl2x0MH9GNRNLsySzQItQ9i6
LCFjdSgt0ME/Kt9wlD6OqJb9qY6rmaUl8C643wuoDhKFjVLD2y8lSIMGTM40LJ/KdkL/Ltz7wCqMmF6V

JJKgJr2wAWuVzDACd8yTbq+dWxgpCHE3m0U4Msnf9bLlPjEqCB4iw1o+tU3a0E+hVBhW/xUxd53kE6LL

ndcccxgysrMQpU6+Ai8DmRqS0MyT9bgQb+w1QJtpxa
aUYe3x5Eth7HoPoZhOfebfEXlHN6KXljNJfxlRoTNa3U0cTIGnn0ZLlmiOnvXyLEuHNZbS4/JauTjqQ9

ipWiFV6j/DLXVOH5MBUI2bz0cBk2qGcP2zbuKgayX0vy5OBEl2r8Td0HqJ/tZAEPpZOfnYyydZNow9qy

djACOYsTIUunTISmFu+6WIc70qJN5gRbkbZxdJ0tR3
NP1iCMTJpHTd50v8apZwOftut9PWW9i0l2HLboUwPQ/pXBaw6rWbmM8itGGbyg7LWl526/wQ7aqQTPqu

2bN9n2cnIzEga5GjzQOdR7pn0BXcG1FeC4lMKN3hV0V21j1J69J5jEC7ZdHNMdGkB99XhOUaRUWgjg2i

XpCTFIoI8kMdvhre5R7dM6AA3WxAAcyg9++xqajz/E
cJTTeePECh7TXyc1v9jb/T7Njcf5C4XHsPqq5YtOSxb1J7oWCxfawX34e+BPfkYHLqssI0A93htalo3V

E85XzMUdRrUTbFsmQ3Akuv+mjr+9QYle2SLwNDfGPCpx44lybqntxpiBI3lkLOg/h+pILStBKm2uu/6m

Tc3tvyy3k0hzlfFVLN8WYIeG6E/IHPSjEjGag0u4ls
oT0873kTP0X3z4tHpUe46EdX63NWPGg4AeCJncH1TrK//FWx3czLfmStW2/e3bz/RsKJGfomRbsOIaVz

A9OLfZmoF+cCKJP5hju/bUB57VmdWA29Z7DJn0lBsPYHEDnfwb0Oy5tu1go9GX6ArrzOEHgcLfEOvEll

sP2BDS/dA8yMih7fSNpIqGasUprX/C0mEgkAQk7gYD
GcZyi/6pIRF9eGfHdw4L9MskpE1YrDxo01tyQ0ACA3IT7ouAbjWaDjSwWPJewsLUEnfvb13tnVY85839

/UKbFzAKO0jUa0jdL1Sw1CX83ZTHVLROCjox+r83asi2rD7lk87X6RwZXL8/XrLK27GaWLqi/ffkmo0p

FU2b2+3zkDbGPeJ2GS3hjrHpKyzTtPg/Gb1bb3NBWY
4PJMWltwyVRyvaRhC2MPUXFbrDQyW3opsd1NUsbBrHVWJjpgj2HKWfdtdonL8hlDfEpLY9uFWkMFeevf

cuYC8KEpuAtHfixmjhrKubxAa/MK52obsGfOqij6aplql6tUt1rkg16hljGjYUfZ6cjSTP0zDYfE8cGj

xbdpGibEusZDuDCQ1Jc0nMld+D3/HWLHvTtRVd5OQI
oDFVnwNKkmXt8XzwqbyvdUf5k8F8eXNg5wR+ujMdDupEtFmuCFT6LlCyUVjG9uPlKGmi+u0dX8gW8Lpf

Dw/zW+4w8S3cNv5N5sN/PbrhHQ3r7NeZ5NKntt47cYZn7mj+f//H0i3YLGh1ww/84ArFJjBZOSTEV3Xp

t0aOhsrIWOF9RuRm+aYzQLsGv7+N4lG3AeqBNnhQjo
FjUsjSuK+ZH1xqaS8jxRG2ZA5vZy6azm0YKMNFDNzr0y5dyb5e22hpqVdpj/Wbn4k7+G46GfdTyR07Sg

zRZoJBoqLaRKf7TSc+LkXbZiH0t5xbboduGLkSewgW+A9d4B2j8vKZDeuh9i7Gm/2ZVwch2EC0k7bc3b

8PimjIHyFQGfbvbz8LTIX39LJorXuGE9hBJepiJMnY
XM0hR5m07B4gPZLq9E283jK635Qvhkh0Z467pQscchfhuugykHPh8VavAyssh8VocJ/DL+Od+D26meNX

3jxFhPJSAT/LDVpa3UURz0JeTSVEsEtuSuw6qnVtkc6JT7xvhUmsCsCQQFREu4bUorZ9lKoMG1opZ87S

5ObYzKSgXOZkeHPcZ/mwaGV910J9U52EnXd5RCr2mx
cWOPCKIF3XJ+3/e299IZKvytDqPF0TOiYKJRUdCNQnVZ/Ey5gaFCrtLEsGSDZDQLUIZBZ0hSNPCc7wCX

yFEV2pJgEqd37bOU1Rp9fsZDtY3SToYlvY0dpm4IMbZKYG9NyhIlXavUTCGexl2hibg/4HXcYw0NMTBH

1P82W00Jvp0d9h/lk8B/9B/9R/4kuYzn731X2gxyTQ
9WZY3ak3TiTOFnDRHxBE4rfc33UKPjc3QpYYb8D8Q8jFAyO9AkT4FnXYJesINGy8khKaAuLGE9MSGiWa

zfWP3XBNQagBOkBZVuOYszDI3efzYcdHG8VA5KmVppMIvqAFTJNo5+Gf3QQFXqxjGxSpKnFBPuG87vnO

VudHMgWzEwIDAgUl0+Uy4nkzEaSyfXNqFzYI7dab68
XK4GAF5zoUvzLlO+FfZ5aeDrlH8WfPe9XWToABDkVEz8AbNcQUNnJ03NC3nvQgZaTC4KJN5FZG1we0Bc

JCGuAUJvIY0bgh89MHSzg9DiWFc5H4qlxod2zCQhSYm1XnLeViCqPZJmNP2eJz4OhYX9gGAtKM2EyMBm

LP4FnOUtJC3RI3EdPHL0N1BdmGGnqnBpW0CtOQKpCJ
Xtk4RtZK4GL2RRUSYeBCKgH5PglL7yZ3MmS1MhN4GdkqwzHTESEy1AjQM5sCBgFGEeL7mefXfprZKdZD

osY7LdvGLDLPHNd55sl29toxNiYH5+e3LtGEFbCPz12ky4OSDykuNz1WbYIFABgIkSnKPX4o7xMwv0ea

AleWMxDSc3KdndUjtAtknmh2uSpYay3fiuftad6+P7
oElFMcbimvfk1jp2Fz1zlt0dNqAFUbPZhAoDqXzQIYTJVYQQBVCtCGUSe2YzdhQsJgGxLjQH5UHuP9CN

HSYU1GLf83J+vDWuLPFuPJNo3YXiZ+KmY2Zh+h8OJIQTScvsFJqDONsk9/gKid7Wr0hUIEHfSqOsVYcI

CQAaAwoGA+gkG7MJKBLGNw75CH00kSDkBTCtKEVMbS
5TDWlsM0OaDXJgIHGu1DUzFp4vNZ0RHXl7X23NHoOcTsUEUOqqcWrjVmYRYptmdItgm5XYCtv+SMhPn2

Eq1C5cm0YcCdTmbq2e2+TMWcEGJNOJqAYEKxZEJ4wUUPQhgd2cdszh8Stl8VTm9l7t/b0xBWk9IIdzCg

4+3VAkIqNIbs7oHn9FTbLkogvgoas3+snFoj9yss6+
/qTueyfl7ZmIbk8sp2quE9ta5ztYsmunJ+aIm6hW4/2CAsBA/bO6i63hFFhNcjPKkBY00upDsMb5WvsB

yBbY2P3IFKV13IEEfQsJqALqKllknTfGZK+BBvajyE/JWL6+OPru2g/P/u8c8/3/8Aagvz4q0zcKZFLV

sngwGAA+jVO6LpS47d/Kf8p/yn/Kf8p/yn/K/z8KdS
tz4n9eqFrvQXKY7mJq0AxrZYXysFk+fPp0S0W8f7w7A7A0y1pz8wmYxh9DI3KQy8lFl0wM35vQA6x1uH

71pE73uQ0Dt0vu/9F+h/5b++owejXd3VaOiGn1HhEBm88EbY+w+I9HpOKCc7hWBRorDLaCH+zI7fo3N5

SrUn/V0O+gwUl9L2KjDuA0moTaQncDyCWePR0Jo8vA
/vx/8Kv8tJBNeAB7aM/iP49YcSY2EX3u9XJx/YrMIz0SupUboaHH6M3T6x7q/ZgQd7WRaST2/BPtdT2u

0u1767HpLcN3Caf0CDG1HAK4yC4rSz9cfGoUz5ltlNdl/lCWZk5r01Elt0HSpklMG9s/eCrifzBz/Trk

KvkeFxMSv+0l4Ks4fIEBAhsesuIIAXMH1ecuia2x/F
5N/8T6MK4qfd1se0D28gmFYkQNxxnR+ANTqAwImIv8uiyj/yP02yP/Z0N440cB4LmTDmGXEgbKhXVf9m

rLu2K09sd8mLmoPHiuNRmNk3OaSe95PV0AdLH1bkipSXr/WaD/yqf0q0Ll0j5aOzRnBL+DLUvrBk0gdQ

Z+MINDYrDJ7DA/WVusfgFN2iMrhDo/4TT8dxFJgFIj
R+dK93K3UUoUszWWVuvlZrX5mFwG/oLVy/9R+/WzuVlHFc3v7FVpMgQrHVq/JszmG2B8XU2/8CD/3/EQ

+wdgfpr/xRk75o89GTn9zha/Tatyana+OgDB/mPnU3f79yw/OM0UrLsmPL3MIgEhsUmLizH5WreYj1+ZOXOU

Wbb8YcMt13uH8D87VikmWc+ArMKNwKU/9nmR0uC3L6
Ngvs/0dheWJ3FRsC5P+oERoRD2ebDpeagGwKvmp01QSgKft1mSJMl/ziDpt13FxY5nDwlSjlF2avJ5Rc

8p4nbyfs8E/KCbNS3CZwtDwq1AWnUTy2D+f8EKtTm/sR6L/8WA/u/K1m/x/AcQknymepDszPgrfQupAy

87Bs41sAI2TAC5kLksc1j07xFT5dU55u38ZWZGpOsa
1jrGUKWbIw01LUG17PESynVFMnjS4d+AXNKfHWxAuFMPkv+/wv1f0A+uFf1rBRt/y6C/ИРИНА+ZzgKYD7F

Eh4RSdS/L4nc/lICqQQb9qqCd/AntxJ/52vj/4vWnc2ZTiGiv0bq+LTvkU1qiI9/lC2zcg1ArIZH83YD

ayy7K9pY4RLTpqvVCQs7wsV4xH0NNikPALWH9+dEP3
sF7taVHxvt6/1I/b+gGa7/1yT/0Oojs0eCkSek/h/fkiLRtwb7HdkeU0Aenr4zGnQcJ0r7r+wQ+IlLr3

9qYpefEcYd/wmD/O9h+FRT66UrpSrq8YAHH4n0G1H3/DM/nzmZtZhYJtTT0oiaxj64Okt8J0/1+ZPx/0

Eg/4/s+U6NBUcNgmhg9sw+BzfFc1z01UbHeiNPmk9N
qFLKz5cSlZag0wDAmzX8zFtKPqTxjHfEJ12b6ScpyRqbmWQdq+zuRF/7Ewfp011Ym/87ojbz+q+4LPyX

yAh0zqH6FxLlLt1Ebs+SCnsU7Sghpt0do41LG1yQ/kEC4ib/R1gi/GXx/E+YcK1iac4jH6IWbM6eT0rl

HwC/znod5pBLgL9E8Gid/ibbSP1XT57L4vDN/8ZixH
c/Ysn6Cm5ZyilhH/l+ICG5sjtVn6W3uah0OIiFj7Xf89tu5gqHPa7eL69yf/Q7w9X6shXbn8bI82J+k8

e4oCf+ytJvd1s+/xm0n1K1oI7k81Vm7hScSQvIJZgU432PGj6543ZLDdPVZDwnIXJOVDVF2fvBIc5L2P

84/Jke/7Qtr8LjsDh6/bXQ8xlQ6ffUvCwc5EjStrnl
3dOXd5+14RvdP1MV1SHDBKT/NzOZUxgnMVa88ayr15d3Po7kNESh/yMhTvsq8sF3kBqMqM7f6qmdEFFc

dzQUYKmMDvcIt7UGwafIbdn1Wx9Ujl+pGwU82vt+ji2EWENjdhnlvhH3DTceeqxSL15cwZtnrUnE7vbs

FMxlyPHJiIMUjxCNFwBeuxT6gwlba6fNY2ypYY9k0v
UUvaLdLVY3aBRDI6NhI8nBq/IrRvcDoB0/4iXQ6oBMuQuzUWONoytroNw1M6U/gjxvvuK+QGZQMO4b3u

rvYoj+5hfQY9PA8g4drZYbW/VJiJxF2Q7qGv1vuc+2AhraozCIFAFLwfWlhwXywRqTc3WWc72hi+Tdki

JQjkYlc5UKuF4jvpc2CcsGkziR68Xq6rJ+CHhIfiVA
Rk5FyuY9GdykMkVrPSIIKYlGyFcXLLID6+JBd3IXrCl4n6XVAI8zohs5MF07q7uTpVWoZlgVHlV4inmO

24be937wY5VPTUpYk5QdNuwtXqUtcGBLIr9s3KJ/YtVNSXaNHoPqAdPr5o8eLoXzEHqgbhgG7/76arye

I/tYCcarBtelASCzrapgPojz1pWocfAb2BxKFJQAAP
QaFagH5anKWGIJZTdKHSejYZLHB17qM2bxgfTU1ofX/Ikwqr3oEmCzu0XIDa1ShuSWxb+f8JghGZAjem

W2O9eMY8t2INf7JXq3xppWQrEYKhs1LvdRawqDK4O141NuYy23b5jgi3QeGo1AO+H6HpSofcS2raeqL0

Qck1ChIZIY1emUgTv3pgVsuUJK2XEY4fZNHXKPG2Cm
zes51ujun3Z0R9h0GhIyXF0BXfJYi7XlBnywHZHqASMNOaZfBptr1Xyx26GBucKAcFKqC78OECrGsDZC

m0N3P4NwZ0U0CHnIgME6znC9VNpSlVYry5i0edWKtbjJmksh3nDeYyt/uj4eXQLixWl8HHu8wucZvGJM

78Ox4Anq3W8BydQMiFIj2gYXj3EMGliH55Jw1rkN5z
lfuJQUf5rEWAZraei0arS4/7wKVgfhWub3fCr/EDpvHJmzg/GRXC91xIFFEqS2U/Xnq0/CD7Bm5GQx7B

lZ7QZrNTg052R0ZYe4H7+5ImHfsXt0TCJ+3OMDsMOO81v3RE5A5QMXJLNDp1mTcF+a+I06oVmVAGv1tV

5IEkHqQILMmBKPLg7WipLLdHF2v3tb5eWJMRMxK0LG
Mj4T7WMQcAoT2bi6UrINPne2Cz7RgfJtCsMOViF4gb9p/gHQQ/XlsJik++0wLuRgnwOP0vkV+N90ZWVU

9f6uO27G4G5C0YCimr4B1Mvyzd2W9KjQ6N9w9X/BGm0WUvdW+ck1bvfn4ch6JacXZrbZIxte2RUoWqEM

/a7OqQQtDsM2inYquZuD2TXE/tOs+XGWZOlmF0gPq3
sT3p6AyQuOhK0j3ZEbL2kJeGez7/GmV9q+jAqH0t8sd82hHMAVNnmKKgykAMaJclwk6wPDsIxPKZvW1A

fa/Im8eOyjXoNBjC4pD6BglI8LhJqS0jTLU78KnEPrjBF8joJMicYwXe9kxZmN6aTnk0lQtbn4d3hF9U

V4HHS8Cqq/6NVMRmha3Vc7J4hItOi/Cj8pZ6vvczhN
hI1eYL7mXWvwi1bOrkCs8UNnP8KoEPCRmE/w/TCZL6K6OsRZCiFfbukCq58bIGFCBSvX9fOGAb1M9DLL

1J+bh3Vmfeq24Mu4Ll8a1BbgTad56OpGwkpzJCxPVODfq0C3Q1kO8vUaBKyWtEopqi47J92rHaiWOJI1

QwvsBJgsEgNNO19mvHrp1KEyIspxaCekpV/Si57v+P
bX0VfI2VJjduowq/MX0IvGea/+uuD4FzIR8UDi3zOGzsJ/wTs1Kc8/dP0986RQkuZ2vbdlbMaP+EUTIB

5uiG8toLrJKfwbC7TwX3A7dMX3+Txr32DkkvY0Je6bqdiM60QNl5RN2rcFw5r9yCVdQs1mvSHAV/EwUr

QHYdgbS+eZEHtHcmUI1o8aUSiIwQStx1kCCCoVMDzv
CDi8Tw9kRlatIweW8roLc4XtURydkMVRACPmbJEkB/f9kk9fwtMeWBzRflXchjzJLT8Ofb5sJU5FnNbA

NBeTQyZ06GL8h7r/OJDI9uJ11uBhQ+Idr/VYLfDShei5me72KUQI2emdz5EBbBnBAIpKgHx29pxkyNmA

yuAypN+WWIC4Dm7w/V5d5h0qOOkkVxz8pjr90mK2t9
TuQEmvn2xTxFSut06+NHHBLltqaK05zAZRgQVec3Ai5L+2LKiK9eZZuivzfgIRzs88Pzri8uPPeHueeb

662RCp1bO3ayNmZ+lNbWgzF889PjxB2U+aK3+gB6RagUPafK/t+vN0Rqy6BZerkfk7im2Oyi+ome2SKp

D12BClGLvV9aPtSrDqm5BL7VksuB6+GpvWE7mXVpQC
DvwgW7zW2xF+XQqphuzX4bfOu3vaMUG+/LFB4j0qqmxv4Xm7poJeQ8ynenQCZtYLtF7Ljx0Nd6lI9rE+

594JFew3r47qI5/gbZ9PYYRAaV3EwIFAWo9yyUcovaiALcYDnbTEHrRHGauAzYtt99J7+aK8upTynLE9

7xT5nXmadLrQeHn5R+XlGqrSlvoP9ZFlCNjGTDaRvO
iKJvjcfjtD909SBBpOijcv12P9ozMP7Y6QmfTtAWGZj82nXmLa56LoE0rE8EtBKOAz7Et3y7/qcwDftY

qCUk5hc4lxKmhYavFd3+BzE81Ry3pd+2gHZXunL60T9xTJRSzp85zQMbNyq0LWMRQRxhhvXARlqgFXH4

vYTmqtlr/49Wc4sgpEXD+Shannan+hHay/1fEM+38YfZ5h
jKx5scl4atmsNGvinDpzsXrfOD63nCRA3nBHmd58cMxFiqkqJ/oEhfTSIdrbKrI2RMpnoG2lllo0yPHq

+3UDrUVkNZcV74jxpp9G3nyroKSPVSUkd6lroALcW4iWxP/DPQ1nyL4S0w8rv93CYbR+bbo03ppz69Lz

MRJPPGzItgwBhs/gbVlm7sNxvNyr9fFxliHFDh1duC
eQqzp+9NBt89ryStct/uqhpP+AJVbrXyYVfUs5PbV8cpa80LnMCvD9+/djHe1cBeu3mBhsbbMXZp9Alf

W9z2RXUy0+kcxsm+tvgyiKfutJdLMRc4+J3mN5vO+UfSH6hUoyE9jah4V0zw+uM2ZApR2WJ3YWevYLuS

nVIO5noNtHQJg42WQvo9hqhhKxF2zjpe1UU9bWR+RG
UrkDCHM2Gf4FtRuhfudmnRx+Cle9NWINw89qk8/14OJDXNUtwYtEtzSnZsC55elsTyYZF3jam9jlI3op

pnYhF9PMwex0gcqjAU+t/wPgqH+b2Jg0/hYsuGT2+USemKzbI0KdHA6yzgO/o4QEFxIclTmu3qYz0E3f

rZ5qwIypNCrAzHA562iIPgGtwEGVfSYXtkhVtq2XBH
tAdtUd3eLq7xWkgy8SZtw5MY4cXKtsVqrruSzInxqg66v0/lKJNWQVIDiD2dNJpiyDXoniDvJN/M8Saa

yFgErHfTV+0wl4y17wT01MoVL6Tm9pB/YqkBYdB3UPQUMr3GjlpjSfGb7j9GELIeSiho9aAAtlyBq2J/

MF98lvt3mubkU+OSGJunfA5TUpnPWosv+8yAOgFHLE
//imTQ/6uuhI69Tu5XjbrJP15sRBE0PaXZluiIsm1ja+IyDn7SzVufpL8EfU+5q9zlNvx4Qi739ypZjf

LsEdx4dgK3+mXAnzFfiXKHdLZ/FvEr6Zo9dlAT3XvhpK1axNu2s0mFYxVIu7Ca2QyOD8Kl7K1IFkhBMl

BmLI4NFYt/NMqEwUdr3dkFMWdKGtAtihEVKCIb9kwh
axABoDFJVybs1OsINzwX70umX57xmywtu2Ncz+llxlDib6GvsCJ+oxzda6UKkRbj6lzWiLT58rNRR39A

TMrC8povt64wfgdVYJbV90P6ak97Oli+1RaXDhwKueAnJFpjakYCFy88nFrHvglbuxfHVhjFeb802cAw

ps8tFmdgodKVdeMwgjcgH7fxrdt/KEPP75KVpln+AL
PntEGvtFft8nZMpuimsBkW1o7jirNPoW2R+CLwp2+nmVkdDofEpxT58eC5LTfwpKhA3R7jWcJc/clHuy

gW6pb2W9kNk+BN5OowL1kNoc84C7YDjSzp+dn1LH+rdGxQzso7oxYnci+B0MJqke1gIuII4m+540Dada

3bqraZYTBnJmxHwg9ROqIyoCcQX1gu/EaiqBBBfBQO
jGjvbThFWls274qeeJ4ZuDdQBT5f/M59xA3r03DTLn7Ef3UsZ3O6ObNFDnI2qNauLN5OS+ZlmmUIS6Qf

jPG0gMGPhGqozwBJj+sYmJd7YRtjqiEGBQXJhTVComNGAK6U9rjsk1elPFbGMEtJ6EMbqQ4ZYYpYq3ZW

I6gM9iNJ5bgx0Y/hM/2LPGKIemNc3eeItZW++zbLCa
60wtGcPqgr0hLB+A0q5h1fXldQmWrQfcAwaUoVMp+QCqYx5wkNQcnoDpyTAC81OpA8+WQVgi3M+J+xFA

tVo6VW+XHyh6Cf57kjExBZ59OwCL/O6pLwy0nAQmka8DOu5PYIyqoLlwvr2HUAIf0UE+yBkn1gAS8WJY

x5dBJeMo/wdRbPvPPKXlWPYR4010Kp6CubFvUC+3fK
MnE6iMhDZ20BAyif0sjc350fw2qOfkIcO0qDGZ5vOnzYYHnrv1qpm+OhLCvDibLQyve4EmxLsGgQqNmg

ZF3eIBRX50wlYVeAJu1J63hkZTxEEo5eEbSJnN+SSrQ+OnscTvFxO9VcDp/awy7wiR9yY++wE1damy3i

sRtuy6DlZDpB247d5xxgMetu6CoSQ0GImRhVop0Ww2
kVo9L+lTUdSt7tWMQz/vxACmQvUe8K1eSCCHSASGqPdAJgGBH4IBknCyj7ND4nuMHTkEN6//5LbVoiHc

12jkdKAA5F6bgaLoMEVohLzcrbwJmCXkKoH8KZ3zwDwkYnpq9jNs5n96WbON5jrvvhIfmI4s9uaLvlOb

iMur728at+fgbXiO4KfEhzx13erD+7aVFSeYIzdDtq
KFcr8TxIfgHxp+MVAZixWEpJmm9gbvLPrJoJDpTaxfvbir6whIemcODypb70lMR3g50Lmy6DaGXSsv8F

lbzZo8nqVRLQ8VI6hbFmyo29w3NHjzvtOlWeFm9ziwzkQtc06bMEuagoiDUWpWUYMNj2brbmNcYAyufN

YyevsBaBcXHIvm/T1yYfQx8tEid50Nx4mut74pa+7Y
4MlKHiH5+P88hP6Mmfl8gGFnEGn4Fm18UlCp1bwB5CIp+tj34NWEqqYajW6VYHBvF74A1z7X1yGqImCM

a+5Smyyj4Vv9hx0SSQi01nvim6ADFzn+Whfiz0tYyfbSPqL2DcAMu9/CkSL2cEdTtJqQ7AVXORm8NcGw

uP2lk3KMm+FPzaiffVVWGH3sLoIo5tp9WZxh+a7Oqt
IhX9QaJxOxQIYZEZmRFiGjIJ6+v33hE2dBdyBUh5w51fB4G6bnigoqdbEG449N9kWKRiB41byDtR0gCE

lGmyH3Je/Y3jZqvjB1J1uI3iUTLV2b+arwnGygxBzOygmuDteFt7P+fZGzYxrAO3lw5tEMWrEKMheUDU

BYFudNuxjKZIx8UArE2QWytNxJZWzdaHb39aS+hyrK
wkXc0HM3lzjysDhtcGWEv1DtUWrt0/6JEuD8sD7vuTrEMsnHA9UUtT+7cBHVGIsBXL+okLLyxgqsLTim

UiBFtQBppLZYkM179wh35fjVLmj27ifBQLplOFiF8JR4vNbEoIzJBNKXFSwCASiGG238zAaXynZYBKxy

AmvBTaDmsUL4fC/Gol7CnYJD1GctqWUtgm8QS/bbVb
TaJ0FqQOuYrYJ1U3lszMquRqA41sXNWyJi1ggz0z0kxHBJiHWxgnIhnAj+V4BgqCCPvy+ETMy+lLWJCD

qljTSEB6BsnZBvKWP7LyqazgNpHrEMGX+dQqGamYfBGuAO6Bvsxx2ZfWyS6Yf9yRJalsfGOjWHMNaH1G

Jp34Dqhr35tTRPTDVwQ8i0WxdFYwDTEjYkzKNLOeCQ
LfzvFK1GiWgraEPBJVa+nwP9J6oXAqN5eDXmvODja97LfkZg7KPiMaudFqmUJ23KDgoNW2GqFnC9IJ1k

BPaVzGLVXCj+cMwxGg7dWauyYo3bO4kFuNxqwi58lIZT73UQX5DQ6Rsg1g33bR7P+XMcyvM2QYfhJUoW

TF+1zlA4tHv/8kd0O0fQHGRsokguEZ2m3hKzCVpoeu
wu43VhAfLbLBKH7G2XZ1qFlJ/9JwbkPYfMfTc8CxcA5vGoCixKhsljgKfJWJtTbUuSr9szh654IwdFPR

AMedZ9hBn7CGw/8jXO031OSsyDJGmp4IalpL0fCttdaC7tcUSLlgws+cvscuuOl9f+guR7CUNEVlhQ1o

2+IIdCtyj47lAlrMe3/WvzeQ6l4hqbIGXkt4uuexaw
t9MscAOyVJii1a6x71ewPKuCV9MdXuMW+b7NGsSE1JG8sOhOfvlaVIM2x6Mkq1Mll4s17gtmRQ9SPZnb

WZY8WTPUso6VCXrxDcNglbH2pR/5+JEraxXe7RHH/OkStzJb9vsLtDxYpz44Eehzyc/M56Zn+0U0f2He

e2WL7JBqOYj0/4z0fUwzlPI7DkSdyfQhu00c6uA3XR
+0Nejgz5uJp2ODcGehbwC9DhCUxHhJ/k1GXpoc0pPjo+FCyk5NDqfPTy4mJzZ8sMc6WWO0QOA98GRw7/

Wlc73nvkXFZcVrDaDrB+/cbitSQShe7fl6PNZVvYP0bBZGB9Ble8wT09l5t6pd7VsHga7aygsW4txUrE

I7uMA5ejZYghTE/LDTPa8A+Ad0eHkhnWIES7vwkaT2
WydrbZCJYk/mGg/EJskS+MacRRZpCQc3gP86O60UXnAcM+wGlOyqEf9VqkWMk0/5cOBLEs3JiyeQN56P

kPVqFYPe60fuF9cBfsQWEBirS1i/HjackqRkrDPbM6hmMqx85Ri74kqikxW5JBjpJ570MVcN0cRKDZbH

Nl3UIdtxHj+Uhw2XObbfzyKFkTs3xoiOlBKp2oOOs1
SXBYD7UELM35Ffl4iSUYGNnzaBkUJ5nKyfNkaX6isjMWCzo96Ekchf03ZbLV4MVfG681iWoTVh98egP0

8XBhaO956dOU0MdBcpMnhJp9wPHQjQXwVR8R3yw9jaUB2ZzfiqP3kFmyYUCvskozSLpjg6v9r2ObEBfL

w8jolu4/fDgeDeKqZFDAj98GF6amNp81rqs/atFR4R
WkzwtvdKAPJPi+ttlYYVQNJBMDC2XVIZSPPWDGH9qY6p07pS73O+D90g4Z41WWJBeWlzv9P7l6ftAZEk

DI0eGAo8VmDyIJRhpTKRXq7oHpcBh6+svFGspro2wakutFJRD0MRrXK4midcU2wLNd1E7t3VKSC9Wihf

eKJQsGAuAnuUCopmjCGXNZedfNzukmcUyUNZRxLfhe
fWp9LV7fKLxyKFzqy7IN7XQrTwR8VClBZBpV+adSOSZ2sNldwv3bkwnPZat7kS4TuhzfOMSeQVMgrfmV

QfS2zKcXc/zgQ4R8kAWGI2JFPR+0THwz430zpm55qF+YmndeuhUuhVnXMIuNAyh77Qb1sPV3vGkZ4mrt

2zORAuu1mouLL7J9RtKbB7I1yj08pCc8gLJmHHAnEe
KT395HBHvL3VS0a6izGHWGycHnUgzLRIhg3TnFxX55FaPcVShH0rImoEJlqSU1naCFLo4dBaPQOOb8kR

bl8VuFYwIwIixpk8Ial6xyZpzmS6dzhYATfkj3HtVyscMJvcwSYuvHgHw9uNCIE32bGNaapNRTWI11eo

4VHUtdUg1hACH1/lqb378z5Ax6nF5cFhTLAcTRf8pj
OGzaWY8gHVBpd7ZYjinDHRWSRI0qZB7iRW3gHCcaJaZi15n5inlrbU1bbN461i2v3LvYjucDn5VCIJLp

a1hL13x8N2fhS2rkSQZ/j/0KCS1/R/aFC+WDdOXTxdNe24bQVHFM/0JNydnGwC73nwpY6OdYrF2NdODH

hA6OM6oof2xYupV+V7igo4nkpKpouGffwuS2fGOgen
fa/gQf2r1PdmMq0F0psLcNyu6GdtKvIMl4RS6qsUuY/Cg1YgzGUGnwn2n222IWcWNoDxvBmgDuyMH3Vk

tIZgTsvIQwy7+uD04mgbqovZHcCBX0568CGUqCZwpcsxYfWUf+MjwYZv0+FVTQ9URFVy23v4vbkXVMSj

GNT3Bq3VFjR6F7psCR2VreQVoHMvdYbt0tKF2x0U0b
4FTajee+T1jM76jcqpiLaUfTW5VZMIwWylJBdFQujsbzmuOafCFTHPkFElgHBVfjqQ11D/XCZbsJg4wS

nQF78ZeFm/ZEmL/PrMSsMexz+HxB4d3OcvcJCdJKziKyEX7GXvos1u6H13E9e+3fSQwZJsb2X8gukSzb

HRxOwWqMUKBfbOjdgKzdXwi4uKA/beXH6xa7lUfUMu
YczMHBI3vWiAGtglrXoXt8ksDLVZCEVZGNp19xjPysu+1qIw+eLwPFlDVKiKcXBhRldOCuitj0bWV6gJ

Owkh1LFbyk2Yh70ZemjTGqeJRJvspSOcJ6A7FqzyqaIaMNRb7t85PN2E+VbecCh+hrgmstrdr0JmguSi

53BTD7DBmVtHflbm8+up1RYysRtA1ZY7tj6OH6//DI
KYPOvVxBaPlYD3dVgpHoM6VIPRcSeRjz2MWleAS9ZhONhHxZdhj1Un+a9GirIchbzxICjNIvaP0zDB25

3NC36r564xryuEdPhShM02j5pROYaAjD/VYnIfQ4bUVMQ4LktRM/dd4ItFddkXIscyjJECZMufHtxHff

4LjcUfX5Q+VGCSBjWJo58QExXk60kLYGzoq1WR9S2k
7c0uCSnw1pRALHiqMCRHsyjkEz+K0sdyu1wN/vr8pb0kYgIrste1p/0L0DeYM4HjoxxhumInCJPTiL9p

xq83vRg0s+jM07Fm1CN6LWOY8sVy6lVItzm8a9AI3A3n2aPqjiddI4l4NNL127ncHYXS7OnFrRSQimpq

63QawonOBeMMV6SXZASCSFmO8sflqzAcv0HiW1z14r
WyBhNNcR/fA6idmVTPBXRXvDP+3KtQqww8vMFyrsuQKkEblLpJus+dcqS3XyhISSZq8LWtMdSH9a6tgX

8QBPAqesaSPS8V96ZSTxp3v+Zvnjga13op0OaKxixPfC8l0ckRxdkCahBWZzeShF+j+egEwmNLAg+QNd

O03X2JPYo6dxSCjcXEhZtZoEgQwyUsfbMRGUaTyzcf
hIxZM+PfsWV4ebGG0KZClFxO9s6DRhbcXps8Pw29E0fs9rHRMqucenKyUAFuoZCArws7B0VGeWH3CaeT

J8X84+05VY5V/ZoieLCPdTuEBnrIp5spFRZUXTazCcSYknA4wZRFDj5PurrfMwu4MtJSsV8O1rtV1v9v

LS0QfV70INC0s76JL79A4rYjzym4CBF5kUMS2oUk8r
QoWWVCVDaaPHvQqH/ayEF2wZcrHzZ1oJP4/BGqEDIk3wJNl5ziOELw6yEJkmU0amurGba+O2eIt91sFV

mhnUhRUb0bbAyggPiGmd9IiaLY+Y5fyqgGVwRHlj1l38FB+HQqcFsoevrtMvCB6jLBDgwVmoVJRtrZEz

GdyrnaamhT8ExkJ+7DxcRYL1yV6N4X5cttKd1UOGCa
1FVk5s8fi9qmrEjSM1/VdymJRhwncxs0gOLzAQd+gtn2GFOK5TWI+vMmNUwWIZwz/YZELp3bCdiJ0Kh7

CpRsIJgPgy2SSq6OW6ZSevC8P3QLQhVxTJDSyyVhtZiAhXTtg8VmcuP5Im4cVj5ZSG0Q1jeZSX1YRPQ7

7HzphXlMeBvgi5fxfoQitn7+vxf4ulTMSF68g/DO8t
SJLfz+oWoRmk/c5hAZgBx2LbOFUWcYfCn3C0M2SaJQNlPabg1kPOOnlNFjFNrlybwBt82oRck5btCqC8

LmPnOCmLKWhdTYPkCtBV4UrL+LS4wcorgDzlLOJUJk56OzsBLFaN6+tjGLzsEbF+jZJG9CSZ9crGi6MI

4El17eBr/mn1e0rkEowN6m1069gzE1F4HvMYz6hhLR
fgHkeuZ2kE27R/0M4QLelJa4PPv1ZXJZiEj+8vGTZ/DgvKVcquftTPwKSpbEcAfSHAjEE/vg7V0PlSOS

6Txubwmz2Yn7wP2iVqErqGuueWbRNES1PYDGEEUEnEas96LjuMlAx+hWvX6gDA+z209Ms0SG0UrDK2ml

Bt6M2w4B8cu9FafFzrg8+M2Vn0BOAyrhfKl+egD1IM
dahlia/ecsqyl7tCbNCDdxmK2Dug6cEX9dJbkv9fGKd0nL90HdUZrCEI1fcC8mCngmK7He0IJLbubDIbnqK

Gtac1Y8h8PWgmUv8b4+a0pimzhSEa0CWRfeVB/yWFgE5R486HADPrBISKpiwRgd5Yq030hE4RHUZTmWE

yboZyvfInvS9hw249Qp+k1r+UjFAqdJYSL4T4+AOhF
ALI7w4w/7Z+q8e4tIKeGey01RdD4gbEZsbBmdqhv67z9jMkFOQQOuieYe6IGgBDW4YKBtEovpFxzM2e3

q6b4vzomcR7CY1Znr+mgzXPcrHSZBZflPyGxp2QAhuG0PhYrKW/OE6jUsfb5GHUROilIJWNGvUj6GA3m

/eI+Feeo+yOriHMNT48qPdsTMb0fjMXIBGX0ueBuFj
i5Ho5brNVNvp4SmwivweORjsZjDF9EA25WEvwHSIiRVw+XNmQfcnq35RtvWVFNrZKeAEZZK76iR6kk9a

fqgjhwtIXFdw65Qvv1k5DuIRJs5jxc15OYNnKOhQKcRN3lKXFog+QQP7g+xW9SpYed1AP8gICcHBEq3l

2aQbdcafdF0mGpv2w6fS5PgCGmjlbna6C9dHJha0vI
vF0VSiDwfXmw/N8qVcvieCGjPMEBuf5dlGhsV7Z19oTtz3ALq5S9Is0gk2NkVNNARCmjYt51hJVKdm4D

FESUiXVP0Iv55DeFLftivn0Y3EqPKRLHRxSTbngEWH41PJ8urHTZK0oDSphuzBRfLv2g/iCgePD8bqES

R1wmmTDMsDeAluRZKRRKmTpeBZPTUfgLLgDqosOuI4
xihre8vjiJOVDWMSMkbwwI9RaTQc/VeSJtmII//Z57B4Xz4/sBHR4ZW4Z3dR3MxsJOe7yDm6ZVRaECjK

iueJGzetfDLkQlbaRip0PEF83bDFUnZ7MbL3EFQ7WFZ3+JciZCLSQd6EMDVG0sXxIDvOQ62Z6fbcPcsW

SL5BqFtogNv6X6RSSAZfSqcoGsVantrbpTnH3TfCDD
uKsO7xD4Gu5zr4AwKKazRIz+5Tfrf6ER4RrpioEhOHwqzgWJUQn1c3cGpi/F0vscuIG25JZJnr6Wfns2

GF2eUMNKpnBitlKxXafNRr/pmumxFff44OQEp7mZAVYzJEPr7UDGySei2xRp9QbBHH8rA92l+spofUT1

yQzXaq3RRFGQDXEIEGSJrrgWJUpQOgKZ1KArjQYGGf
6E6YczrHopUU2ZIf1thzbIKoIOaSRotaqJpgRCVFmbe0ANjpQ39mgOAjaaMUWAZ0vOw+jYHdhHXo6Tnh

/+zfxKkclaZubDrJbGEllo/j3O8bu2y53cedRP7Ae6q8UsPe6rlZdRyoKYOotYRa58efQ0yO8yAZjK/e

iAVHNDXiz/KOFNBwlLafJSZCTkA2yhANsaG7JlaphI
igW/7qIDS8b4+CUbXqwQQxI768CZWs9TZRjaNxslX5atbNarWbTcDaZYuluGkxlBMOdbQ1HawInWDU+z

FuMAbTqtUTkis8p2fyaH4l7H81FZi0yH5F38xZCMm3CR8VEik7WXAE0YVLb30TAZDSo9JAxbATa5PJJ5

Tf0nl4MeTWJKwsq3bbt9EltscMU+Xuep+PDtocC+Nr
hTec+qb9DRmO79bIE5BrfwwPZTjUMmlIHECq40swf1dMEROGtNlw7JQuCnOsPyEgLnryBVyFh6AZqRY5

N7ilqGbHXTHmMkKCkoa9FR0g3qbU0KL9tRaV7o8k1jdzPvRtYvxZWmjVmqzC870+hOTAgzLaOBvGKp+v

sbdOlM4bkkoE85rN6hmvM437GAUCbmi0wb4vZCE5b2
XzG/6QwQMO7mQgl3wxq4fpmLEEECLreGm7HJkUL9y5FPzJcCQxCcAmBbIZ8NC+iYUgYcrjUR0ucp4uAe

aexR94Xgu/HCSug1kFEtoErzaUFHEFWLKvS5zNntKB58bGQnYnaYQ5tvcm8nL9WcmZW/8Ls9ja2xG4L/

Hb3slvyU5dLr9s7B4lftCs+B5bzbttNekJcNVnguzC
bgZa6dKIQEpVdsMi3miiMpV9SKKg0mlcZt7bsWLkdeSa/BQguWxoEfxb9gXR0O3UJqDVXYcgfEy0xiqp

+ZlkvqrEhPkTyTWed5Z15BGEXxWmgm4Uv6/KO9NxCfdRuQFS255Huzf1VadhB8/KC2noHKr4cCls+iQk

kmUrVs1zV2yhcEWhKtdWWU+5L34pHfN5HnuSnNOQWW
u74Lcpfphdq63YmC8OZ+P8rZbr8NU+ITmbuq/hx1f7yoC4qJuEdPBymrhvX8SFlmYbg3qiGGKmjyFvQ3

9hdBWgYBP1jqYSIhyWNGtJRYsvrvh++rPt4UY8Vka1se4vTVphQQyF3cYi+6wFGprVUH9lB7YYaNTQMb

ueU96ZM9gN4Z1xDtKWR0zuFMMDXlxHZuCG11A7nsca
Q/qtoOwqcw6HjPBmYzSyJwRNlswBabwJlozslpIjDvP5Els6DsVZuJqweZ7ZAxQVYlirhinbh1lsadpI

jvj3fAU3x2BOQ4cbOGRiMkaDJLh8o7y1GO6dvTuVVXxYtnmFV4NIwlNEogIE/DtK8ddz9xEy2k2axXU5

Qgx0Axs6ulvtHRfKhdijlU1NxA8cRJNO63D6/PMX9W
qMVJ1agxp3Jb6vjF+PKOo5Is8PYpvrUv3fyL+pjWwbFvFg6tKTAMDEGOJYsduSotevP7NhhAUfHpCJIF

Adk3OiiaHbjwt66m0tBF89VnvWmTBsjqU38mSOBeVWhCK/KnSeDgmFee3wkzsz+viJxxOt7cAA5gm6Y7

244IzbA+YB9e52WTNmYAyf/BcIejZa3ZARuGZKkz0e
XGvfPHiNegcIAlVGO9sMlwpa10/rhggasJB+2ikEcw6hJTj+cn7ihGUiay2onWKDisTafyPOdpcpuRek

tnOyeGMntfBJjHdtie8SamHtiiABSMIJ9y69AR7TXO27c7H1lD4sNSvlAYnW4A9X+T+yFvZNVBhnc4ig

QppbX3g3wD42vPKTWetkyuic7aTOC2Mnbi58mv+e6H
+vH6da5y3Kuay/cYpCK2nu205LLm2GKZYGHr4Grm0qkgDelP1W/da8WU438FV7FaSNgobfSuOGDJCydv

WQeZHetOXHIR9Q6XZHYjrlZX2gtAUZT6s2AXhJ7VET3CNlzMvtV9WiAL5jNCszzOTvfaI2gPhkykbKVX

DrNpNdGJCS3F3glLrsfpTyEduZwtlp+6PID7jDES6k
En3+tT3cjDL2fVl3Pi312o2JtVvA5znIwNQBk5M8FnrVTOsPeMueMXCA7orpw4roP8ulgi2rSOlWk5oQ

ZN6Cadz2s4IDlqn4CNS0pHjduv7QfGjYfau7RfjMuXvaI6UEEwyqOgrvrw1XOn44Pb5xLKe6jqSmIW5v

REhLksIkntyScfA+TKdlWk0xU79f8SQ0wnUdhhKPK7
HxuWCo9DA9XMj5CYzWiOIYLnNQzIr1ykSBYcUbZeIJlfrv06b3D0oSMlzbTFYz3+1hgnG31Ucu769qF7

E3DRNzA7TCte1zLJW+xiWlMks7c9qTpDUe8qg4/gkynq4A8tRnTnonLgYDLUWqlygZSaJZr5HnjtIGjQ

ByaT5XocH02jgJF/U92QxyWfQnfHemC1KrnMCn8NpH
9+9dA422Rhe/RlW3/TqmBJiV70LNQjpFm0Jqw6csArGM/KU5LpTbQZOL8LAmT9bau++/2IYTi1c0KWSI

TaqIvyaQWOuzL9wlozGAQ0usBFD/m/oIG8rte54HckI/I8Nzqdp/t7jUFYUNdBVE7oxLt29TTZcPYmWf

tHeDaRzSaWOJ+Ufw9uASeMyWx0dyWywIRSdJCEezND
3hAWCpMPP2I0MDoktKDt+f0iLEYdvcejiYDPzo4FjV1SS7yXVyfrPLRzNQcAq5tFNvqLtZxqBAfafGAA

cAKuNpGqQvYJe89FZMCvU/j1bbOuvGEmbaZ+/DuUrsrE98PSqKMeA/TDK3IRQrBzXOHkUzXrSFUTMllX

Qyzgdsgm3QMrf2ZSR0tW1unevK1bFQM9YwtYjnV6Fg
9+s29p5Q6qkgkxdDV/oqmIlzJuT1spSl6U3bga0JaIuhtVnvUBEHdTq7fPfxjO8kAeEz85eL3QUweW6t

5mLoUKrm8Sg0V2FrJSMsCs0LimzejokINCl1Gam8XgB+eKJDFYkQrSn5agLVkTNbrXGdZAEbHN16Tnpj

jSZ9G2pVmbmu3oLa3LKIfKg6NMNyNA6S/Nvv7iN273
5pVy5ZIsNfnU1MUqv4tovhprejyxQX0hmGVneveLHk526g+8kljY8A+KgJjwZnrey0rZr8HcT3ecC75N

dwDNBzoNssEqAgVyQDfqidcrJ+t6MZkdebrMvw71NJfMQlFPgq7Z9Rkh7O6u/fPtA1yqnE2u8UzL27fD

UGBfXfPgYC/FA65poCpGIrwiY3yn83qZgcv3ityQhC
OD10Fnwqt3v23Yd/yMjD8LlkSQGChDz+T/tbNr7TT7G0b60SM/lG+42RELiqeM/CtjD+t+vXBRkrZEK0

rXLwgtCM6GSTA41H1MDmxvDWMuT0m2U8/eLIravMtSVWeyY9DEiRvtBIfm7QToC1d/mSVEvMkb3WQXM5

tf3nlxp76/xcK7j8yWnv12oLxuM2RMNB+5GoaN3fa+
yK7bN94+mjzqo5OYDJooKXFUrnQ/hcr6il6S+NljjBw1xQ2RZWG/SfviDyDOBbzsCzuAHyL3AaQLIRBP

HX+XrBXnLiBpidVx2bIf1cBlvdko5VfTWQlhfDnnt0UDzUE1WZLrXld2XpY0cQcw2KsGNRLxDaDHcMdE

N+tKAut6E5tEpFd0u9CHc2DfFSPLdj1CB8arMx9r1a
BUegVF/UXMOSzq7olq7tj90RVh2LjvQ5ri0koUMWYBhFLaG0V7y6D5V2ozBxGcjQUH4e5Isii38zzAYO

eSqu4uBG0SDUIULH5VRNC7RoEOTX2mU/TaEXy2q+gqNecnpvetvCQHnUyG2B52/Mw+ZGPSH3n7EY3y6w

iH3PLn6QLbSM+DJm4TDqf9ysGQgHVJkCdbZ9Nz0nrR
tNqVD4/SIt7gzsuw9NOncOz4ARIpRJNOA4C5fRQQZGuyZhoPxgxIJnzKLvdWUjECZYlLtyHFdHcIXUjp

DCrReHRxH5IcS5Zj7VrnS6fmdErhLxkmIQ0wRzyyrSLK1GaWQntJ9pXdB9Z7996+bP07PG22y8Xb50eI

SCd3o1d5oVx3K8/xNlUaUb//ufB74t4QUlvXUe6UHT
oJY6hFVXpiCH1b7pfar/6foOdetd3Uzoy4m5feHEQYolNPPP7ejnro7MZo6Avw9eagruaENNytRd/uHO

rOMzkvTS7mY9BjijgSGFQQlWbX4dBdz7jsryn1cyCMbYl62MMirvEJNNZHrtQiylGQNcr+SdFL1/1cdk

ZvhXPGpmh5MZK7re8ulceIZbnXADviO3vBDXHfWKR2
CGbzMUvg/gfTwHb91grfOHHe5EImsORT1CtOq4jYNvYS23a7Y/xDvWasCH6WNsyinxWnWLbMPvYQ81VL

EugvZjf5z472vtMXyLrl03FaqYtxBjQCjlu3UO4eQC0Aw/p7qrM4D/h9oVI9F0DdESw5q3FNhQeeHvtb

OxZQos7TXEUpEkp8lLo7Bju6fZi0YZCFTCZaf7Ceog
FSTb2U4lGBNJzdtqVQ2YVMP0uAzxclyzHjp0RkUsn7DYb5mgla63t6lG3/ekKR2gXwa1U130N0mypHhy

mIOIR9zyRYtntZxdkcANHF9iSMMC8wvZEV3JtqmbptLuAUekwx68lwSYgKv37f7wacZWJNfUAVOtm2HC

vo6Z4bLLr4oD45NzKswmqPoS8yz7n+Patricia+A+8pNAKc
VxbKfspYCc77ZcraE8EVi8EO9fbEIMz3SsSEXd1qYbpYrLy+0xUuv5drvByk9IPuCeuCcSBhIcfNyeNT

PnmTuN+4+dsOKpTc0a3u74/B3vno2gDCDQLyrjJzAB/bPoumBPcXfWpbg3gHVARTcdBoeBywfdgx4d3G

NqTjqgIT1J98k+3QB+4j+BjpTYwWlXCTjrKRLOygJ8
XoH71RIu5jOU0hDMQR9m8K9Y9r0QAmOj/xsqtkcropnhPY3zR6ex83fH2v0/Zyeo1HLIc6vP1NwJR4R1

V7d+slUiSbqnYfNof4xPu+ygfAdP+u9AS7FGHfkI4QcRpxgLygqbZuFcA33Aq6DdDd3AV0yFIcCIXMUz

YXJ9GypNLum0CzE1arwXn9Ew7GTgNIYVvA9q9/SypV
qTC1ontVRfpz1D+6/n0/i/yk48yllF3P+5Dqa1/NCnsmyrjzI2kXq/2lhVOAYatdYlIyYsSTUVI0xogK

YUR8eTdJkelVhRLVqNSgUyOTB12rp0jH9xUUj2j1BueYonFl7t0kO6zxPhYeUyuG3wPQbZgDyKgOoO6c

CcHi0Dtg6pPI3e7oG2QbZz0qbmgaWTd8LkES8PtYZX
ss9AN9uovk1xXbn7FCGyppquM3OyEkRhgVZy4O+Eap9j5TPGIY0LR6FuVf6oBZqbYnecX4AGJBKk3h5L

Aiv84HjfryeGqWlloIfLHQue9EKH+4S2nYBsLODCdRERVjKgDMEL2sMHhVLWat21S+aUU5FgQYYyLGs5

knVYGBZyaGTyr0TdOLtDdls0VndnZOGgp/BX/sKo2e
pY+oMeWsS+h1JVwswwgsWuvFU43lr6O9cmb6V6XmqWekx7LQTv4qkJ194k3yGhL9hf3vw1KTi9RoQTfG

zx0Q6mCrVLSAbaUDLwrFStw+d+/gn5DqAxGYAB0WXK617XaCrpl3AUD+ZKW0lx7r0ESwjk9zmS3vkFux

8miDqP0iCVufkRCG0peblWIxTqopJdf7q7CJw9xqUk
Np5dtWubR7dr2Nh3KPqiBBEz9K9amzBJZ+gQ4n8XKZiyN/q94Viup5cX+Br7+M6fcmCEAN3Dmgp4mTsT

3mGPUkBybmjFzgcXKKdA2pcS0nBQoIv02wdYm83ptC0VQ0DhiCHw2k4wBxXSw+m4JgQsTWLPD/UaCOf9

sxz6N8wC26te1YAT/v71Y2/XPhEl8hKHCGGHbx573A
Q7pgFB6n8yhbzql5KaGmo479aioG3JkqjVCMYayCoNaGzs4JcsfXqTzCvVV+o2lU57zYPIAzrIPJATKc

RVF8k0UY676PKGsrdpB7x6HMe36EWyCYbwgowMuhRD5C+sSbDP/OvXXt8Ma8DcJ46Te4pvjO6RN06xgS

2DiFL1/CwYoSPbSFc2uh06TD7Ywwy9857ViSoYzuKt
DWhP3K95e2AkjdXFSbnJb96qUQc6qMleE1u4t8JZGk4nN76NoPcq4CsoGB2R90ISKoburTuPAiBzmkll

yaLm0Fur1oxuL27EUEqQTdqWI6VWHxkbblCo/HxX0YuYnIU98Vr5WY0HmHdGGEZAVF2ZAAnVDlp44S2O

404IxF7ks2MHcNl+/7yEzIm7MKGkfiC3JeuynMmGB7
sbl56bACUyu5nXPSWGnohKhziWhT2YvkTSJaVLumKxJTOF0CyOmAPSXH6+DPmRYeAX598n4y8H4LFmcx

Bl7FrvGA3nb57kmNaVPkuR/KE5npisKGaLEsN0eZd4gHcRYsnuoy2GKJYBoMVQAblZYsu90Ix7eebc+f

Su5j7m1It4EwkP8IOSbHCQ63Tr7c9sMGgngeTHJj6a
j63OT2htlnNCrEZpZBuI8Cq/Y7S9BGs0vhse11U2m3S8IrtUxLIBpnEsGKP5Vr1k3gSdzZT5dOyDvZ9p

PBuMHExRAz367XfPGFKH5n8wqCku8wTYdgiEXltSvMbO6VnoOgrPZtIaftR70vJ/39hj/Tezjyz/2q0+

nvL/oC/Jhzq/eGu9IUVmLci59lGIqFi9K8x1943/e8
lAkUy92CVtOqi4QemlKHbU4p1LLY+uEROBI9zIEwGdvkzs2ZY+a0HBJddDqeulBcEHMATcMpKqKzPGG+

Vw9wbDGzfiRfW/yD+uSUjHuX4RcgHFZjhHyNVemSkdRyRPzua9NSUEtS7CQ93wjbbhHn/MU6XR4WLHtR

3A9DDVxYP1I7ouVIcokzhokgUIT70KnbcVrbza5wtj
sVogL6hT0L7JVaCI3WQdi4PFcesW+azOLrb3ionf2hQUc12QLmCH31XR1Agty92czVFJHG1QjEYPSFRy

+pwUki4m8rQZf4QP3TV9MT4HMz9+G1ArbUcu/HJ7+6HmkHlG8fucoKR66+qLd/YgXuW22jgZKur0Zfkl

07/xLiwn5f7738CobPD31iG0oU9BVkDFrCHGBij7G3
wiTZtlmgp7e+h44IKcv5L+XQDWEbFuBJ7jYFcsagvVnOidCkNn72DdJG8H7+7wQjmW9tpDcYhoWNDWwS

QDCqa5EAQZKZYemaz52vsNvRaJGwyO7gQynpiQo2Y3GhiHGgRa8LlFdIk+iKh3MNydyrYIU0eyoiCeUh

aBFDXelmzq7YfXtdLb2NZWuKVIR/A+Iw9kXi0bs8sf
YIA0iSJtFLiO2lUyHKti5MpbqiZhzj8U+TFs9btRwf4y/OuBZAz7+ok7vgxr2rw7VgXRCk7u26GgvOG5

IWHtg88A9v6dy9kabMI5HKPyF0/pMI8sV4g7kz1T/pTno1lv1wpBN0qsC8KoVC65gG5UMF3DWeMV7/0Q

zesxJzyK9+euqT8nfxSlNnUR+T1f7phKV8q+gD4G0c
fGjKuXBxIk1B0rw2/2VN2wpE9iTfrZEPWfCBziEggMxC4ixvytGbb2qkftCCOC7YRxpYzt2xYRZi1BRV

Fw4JsQ7jJKhjuhFjBFY9vbvrluucYUqOpTk2XewrawLBpOLRSEgvoCvFSneMrhhr16UJSRfPsfbIQq5y

iAVYUl+Iz0v3hzFOYXb+iOocYdtsOwejQnc/ytwb0Z
giG70xI4txGyG+qcNYfKJYo3cq4Q2n2CH+0tIjOfVP9I1/esy/rRJWpit/VQWGCLPmIFwF5jOPuARJ/B

S7i2/w/cVJ9hMVEy+Kpj4FyVkKOG8PTS6ijrP2hxZ2eueAJk9av7MRRbQMlkoSo+4QdMf7VEwldzJ2ws

FIxkfjRHaFJWeMGN1IA5eEZGgHg3uh/8NilgqKu00e
zsoAJWnCFQxUpdrw54C1iRawoQaOSKEKuprnuS/jEuK0j2iEnm0Sw6lA73VTDCH5h9fsk2InGSqtohCM

WyMQLP8FgNOcV1R1P11JO+Lp0ZFjpYWLMrhR7QzNuO3ITcM0s1raWPF2sXslLH+UGuc3sV3dHQL+j5LD

ingPbrrHeQh9tvnfNKbsxyrtosdHygd4wxqA6SEKto
gdRQUOoU7Da6WfVNY3Mpe/YJ9Jh0M1Kef1ny6riAwrtIvFTGFGtGtxbBM+RzximkJqo86g7oPrPoiHSQ

YDMcCNJWnB+Zg9ATMJB2SKhnkUsGP4PeYw6WLaNI3NN+9mQmA+aprt6TZSDmB0aWVRlwyf87z8SlZBMI

+gOUxmFsCQXGuN3wvMjeSR5uf/75q8Vec35XlSRHmC
FDOsGM1aMqL/uY9L9Q0YCqYsiA4rrtk3JOVkm8I88mUd/qjom/x6JQZQ54QE/1au98S3i/khQnjsT1gn

muddMQf9fnFeR8Y3aoAlmr1wAR7oCBUpauQ9T19no7KS+NMJKCazu4m+YedJqWu/qO0QwiJXsPDmA2O1

dnUg0YxhBWQBku9+y1DjdnfDc2ZnEq/S+JknJ1Nzyt
N4hq4ULg328fxp8LXKgEn3jplUPnoxq0JEPJvGhDgniPWrpnRCq3GnXWu5dhXaEKQfzI01NIBsVXiVaQ

gH7r0yofjjANKCKCE4bttikZoLANLqKo2C9JJRXt8ING5KGUpGTf4t40LKXplVx3dzPt/SiDHMEOhtvr

nyZgKSKmYlpOXFtRTbA2bRj9CGONyq2/c7vCfpQ/qo
MHeJqEAzZjSAEbKexup8BfSYVMnGCnCwOi1VOod3oj++EqxHfJVJJXY//MWQ/PtgPmpcCA5uNYeR1254

59QsoOn/fy4tc1Um29ojWSQQIUuIIpLStJKD7d4qEsksPJVNAWXYVOnWiD5yQMP1W0o3SDkKUBhPIdse

7/K6c86zx/wqx4g85s5qKKbsr9Dasn2vCco9gjyuCb
cZkDamHze3TnjBdAuOv0IYG9RZ+eFvZovgSO74QY0BtMfxbbitEirARJzyRnGrIDlcN7JDvNa5Kr78Zc

YjMj599vWqNDd1Q6V2GJ8028JqhXE++ItVvrZDnimgYwYv9p7RihswdjhRigkv29d93fmQvdwqSlcVu8

vLQF+yY6FGf1aU3WrtWqxIqDHuEgJptD7nkOShPudY
bhGiuABcQbqw5bXET06xbxRPUqB0GpYlTStRTB8BGlxNh+JuOPVi5DTfRVpc2sCau6XTqeKuy/Tootie+ix

XhR0BEDOO83IdnzcRENWCWAwfDoD9Bh2YbkF0Qu03urC1haG1rpi43Ccqd9bYxy/bqfZ/D8ONYO/mF8r

QZUyzasICMgQih8W/Wy5risRAbvo7Tuh7bMchOzhMG
p8QpHDIRM4ppHgu4Zd0Y5d2eeLNmlS0tOo9CHGcxF17Vmumf3SxbGuT9CBl9wiIJ2Cla4r6mcIQHdlGG

khGGseL0iPwrwMqSIa3Hcr2ZL5ZptRCEYHZQYuf5BSs/dOdjSMov/xNpSIEqfJX9IjxFgltIrv+q5+F4

p1fqD9wIhte+hLyFkUYOwSCPLLrFo14nJraUL2OlIt
FZZbMXzQnQc80mtedF7vXla2SDqNj4vufKevbr9nWSGklskfyKUqlh6V1BHgoGa/lfBWmNmAgMwqMtCM

4FeZ4XdVM0j1PLDZOoilf51YEyA8ALbuaDhFn5BBAjOaXySBV2gtyntY4pZhZFPdIkfXFGxCmzwrwBJV

Moore+YLr7QptKBd40ZNhxOWTyOFmGdaOlyjrpfkGKWet
rIwVko2hfj9N1DOIU11U9DAOBtlE1mTYAsgudTfaMLNYqRtXp9vrDcIY68b+SABHSgJQdQmnDM6oGkOe

hJGqvlJjRMomUTw2rARJ6SqQgj+OO2xLyKiJuHB/GrJEzZvioyfI2HgUw4sUZsoeV1HO5iryDT3EglE7

jgP8VQ9qW0xi4NbpXfNjO/NjXVqsCPVb2fGY8xmdvc
M1lF7Qp+3WCEtvaEZsphEboqWJXWwmaFixmKlPz7uHGdHrMjoCJm7cE/Wwt0qwEgjsU/62BSZOKWMPIH

LP4J7oKAV3JHnt+unjAIIS4a/wNQevO/eQH8jeXxBixVk7GtR7WFGxF1lH+qgUEy6uBfwTszQGNc7Ijy

a96ZjdJ9zH+gr0slYXYNKO+b5G4jkcT9HUvjP3azce
dzbyWJqIpamf0tYEnkZ2SvxqMIC2uBQ/a9r0kpha+IXLpy7eX8BVZrOORmXFHYNRpNLXFCM2sTTAcm4e

raFgG1BKT/knUwlS3tXMaHgOD/kVjzSpqHMnAPcnB5CReiJaC2zuS1a8VMm+frMXu1NPjYzuWN/OYbU5

Ustb4QBfkqBnBSW8Eq+9av+TkST59/4QBBs2H1dHND
Y/3zFBD3Lq89RjWMDaWcWF3z3WGfeoalShJlIAlZJwJXZLOfqq7k5b/cMCtWkPwIsP+dI3i5MEMiEipx

DLtFMEFwZpDn8jASHcQRK8KZ6hyDqQBn2lW2VoP3jAI7lXeh2y1wLWMjsOTuYUkjoxXP1EygWn95gdBG

e95dHgQm3KvjdIPRmEV2rDrHn4e4OKX6v3LOF3fHXw
Pr4l7wbVnCno3S4YdXiFIg3xutm7HWrOMh9WbKjPvCQ/Kr0Qo6375DGwXfSIJcomDmhUW5sRi0qFfhBq

rz0TQlqpoxu4xlIAccZIb9LeUyol56QlCECnntr9Zw6GIXyzJ+Lbrkh+furmoBkEWNGQeGvVZTB5pajP

U5PbGN3OflCp6X21PpS9OsZUMpY7+bMTI7mQ7J9k42
TZWtpcNH+mVYp7zfba4ooTRdHlwwb9I3Y6b8idRqwdmm2sX2Cdti+fIzGZvdmh5A4nRtucwAp+MH4Pz+

C/wG1sG8Y37yJCH/rzjOjLjNJMzHNeBSvYHvgf4SV0CBdLtffDtSFnDUIkRnnZb6M3ROGCSIBw7BOr0v

L5sGcK17rctOZIQcBD1225/cpdWbk88QoJk/AM1QM8
ORIQxLM0nhI7U0SULcs15aCaPzkh1pUG9UcgIpemzGIyPdEPEGifo84+oqXF/w5cN4fIEjIQtMyPhjH7

JAvC9wNqfUbhO7UNwR8cnCTlmm1QlOdiDOkNtL3U6Mv4fNU0DEQb2VBgD3uc8Mo4/zbZiqSbt9g14ChU

P9es6BhdixB17JYyK7nRDgz16Ph28sihiN+hbnAcXI
03zgKeahwoQP2t8mx4Kqqw11YP5mopgP0BH44cIVbEPkzFqEYrlR5uEfwsn0dIy8219iUNdhFiI5mFIg

91Pw7qOsiN3v8JJrIy5qKZIxln7gZrMXQndmO7pd2TNf39vhWUIw1U0cA12Lr5/JPJo2srviN5RC+0tH

glw3KdJnM5L2rk3SFe3uEPbzIEVwcAlt50871ddSW2
s1PmkdBO9zvoH5Wbdx2clJ61TC9UrLT6Zq7G6zobPHLvTFY4GOv/4OWCiiOOfsSx5lQ8rEPyZ7OZutNE

CdYBgI/3o6ZQ7KCFTy+F6+oK2udaItREHu0y1eHjF0/LRRlxTeCAgFB6wwewFlV60h+2HEnJUHY/rjgk

nwbx9du6u33qptvujCHE7ufh+RqG42/Z7qu+5x4/JK
Yq66kZDS9md5cA78o9n45CrRw8h7cae8+VUxlkLTz0HHmvDcfOPdPlse48vxZi7xx0/XtxNf4u91T9e9

9FD57Xgj0oyi3xrZ1qicQVamCHNhR82crR2E2cw1SpzTCOu76dfsRM1m3Y60qfDEL2tAzqRXwH5MNp+T

TfR8opqofxHLeG3CKi2xiIpNBcKlHg0KiRuUvL7C8x
HuPy8jziQDJSOgJihjqrrF1r9hASISd5gCjKnLWSfMWhTfUyVRW31W8wwCG5e8LulKVq0JeRviE9C7A+

qJzXrbHqVFy3jOniH/gq5njiPCZ2lm0EmXmC+5APaFi9ZZe7DRHlgWtz6U2DR8aWGveUfrWQsVN1vskB

o5l1rgenGzalYhJLKL4FJ5gLqokBelwiMeRTB2zKnK
ntQjl5Q5khomeR/hMrKeLoVqqZq2V5oeXlqfJT7Rr1neYs7YOZbyN2iuibMqvhMXOfnp24pTJ3/d4efL

kdFjkj7fE4dMaMddEGCFnzBwQNE8fdgFDapxypWVt6PI4M1uDL28sFPiY+DkbuOkKFV4vbYVPCJErB2k

wQbY5sfiaBK0EMOzRvuyAG8gjCoIrIDo5r8Y0JY+GT
QXG6QHrfCxoCcO9Kr5gjOVJRw+XNXgtwotqSjdm8mVWhKUH4qeoYfMXM6XMn4b0VTZhgnvf55MP24rkN

GfJRfNaHfN9cxq1xLhnkKgVlxG92GT1PKKeXnc0VP1zI4R14j7nRx3lbziYfl3o0jS21VFxgImPv+ma9

lwousW+q8jciOqdoLZx2tiBgzdMfc/Xi32wyE+15hN
CV/iG/bP51mdvq2hO7EegRS50/j2Sr7/2b1lav+VVzyGyprQFE+OQ4Nvd0rMXu2PLkY3Fi4bCR7rbT8D

jALIuYF/4KwGYyZi8rK6UrPZhLzYXwNHY8F+p2eeL7ZdqFr3ebk9Ym11mnyOMekCt5amwoVJxk9UPORj

19E8z4vHTSytMW/2FxuHKc5IBJhbpm5DuzCWthQDQi
IdFUQC8rlkA2ef8DNyy+uRvBA0Td1ydEGvTehtsk9NulzXOQwAESY3M9MjKaRhaW9M1BsOVvv69Dgl3J

SKyqiu+4GOYK15hPkUxSV68v78+9r50RkhLE38cq0ubx3qGz0RxXTHyc32bVYF9CJkggWV7IRoreBfGQ

UGBs4uZgbYxxtpv4qnCW79BzRkA2y6NE3ae6dZWHaU
CAn3MzKdhyQ4VgXok8dXOFtNf3Lm6H/U0elrL0oeEuaQ4KqIXr4XYv4T3W9cnOgXIf5DBtKDo5wYWjeG

AgAp4Z4Fjyl4IVvTDjJZjkSZSP9zCEmVBFx9k1GDZ07VWhwxDDorYdADRKL3obPULG27b4MHxfPvBmgI

Bvw4QugQU1sQtx8Q+/Bq6jhGI9dxYfJRZ0vUK0gMWu
n1ROp5Jb7sUDnAAWnxH1PI2t0tGf6+Yqo74/1j+6t8rm9FwRU/m+1e4XXX8io9Fy+2rIU8qnLaHoNskz

asDv319jGqB/GjKu8Z30vNRDXdKa4PbPrmvhc/Q6ULgFiypQIV2L6rJeY8FVGzbDndyhbCuC056dDzt0

zIzVOWvxYRKAGWoSQ+qhqf0SZl4D5K1hbpWQf8wp9W
0cZrYmQ6gNIDHy2KsovDh9bznUFUnPbHfDyBThQR6Gj8EzZkFPIRa1z75BBYqAW2MhsLLm59k5VjGgoS

rKGRHoq1SuQSnxmuk51u2GGbGIuzMzMIBt8+TKOgcidYZzpd/YqVQo0DvJBTxl8CDIxQAw7pVBmQSir9

vLBjmiUleJ+a/kpswIBCmUP9qkwlQKl0r3NvhoNp7g
ASTf10boZLCuhmkTmu1iJu8sws8P1SwRnJH3BRV99XZk2Hsd9rxB02vMqqJUEPi6D6A8qmcpUhxQY1Bh

yzUGAtNja6qy5ZUHvJxH+/evUZ2ZQPZAVm1/yaWaypQmXPso8D+CzAT8JWC8fLOuwoAiV+eK1Xf4JvqG

OIFlz7i1DteYtLBDgkRJUHlLU1Qc1RJzQi1aDxFx8O
SV6Q7/pBGJZJ+Wx5QbhlcUC6bmoQcae4q/Jils3CEvT8eFzDFrkyswWmn/9ae4BFURaXuORuw4sqgkaN

9dIieU/cuqhCkDLYWDsm3aoKilO9h8pq2hxLqQq6NENTwWyTL7sWmguTeGUJ4mdr1b6WOA6VA+r16AdL

D2nU/kiueCxlrfU2Z5IGzMxvA/gnjfyrkvHdvysZeX
1GDlMNS1k+KdBjcPUY5fWyKjhcmVq5hMLxq9QmkfK+m1Wb+EiksBhtff5tJI0awnkFiLYzTL+eOgMtAi

v9Zw7YrlMrAztrFR0dImAsTDnN+Lpp1B0g0xpuOwEZpzWNFJRPWN+bTAF6vwVL5Yw7cYsY+xEN5J83rK

wX5sV4QiGeED/g8+y7jMHyAs8zFf9Fpu8iz8+9/KF4
EA7tQrp3hrLdphEalMi0RiLXKth1g2SYSHNzrZKye22kZB77df7tV8cia1hqTUdnRoRDHjlmgwkqOaje

3rm4lci83N7rJUA93JxAMaD8Do4K4zQIkTpNRcZ90BzzCdIy75V6E3W/2wPft0SUI+PdMxrMRFAp6Bp1

pV3O9klMexud9tefqllRepwbhwTmHy9fNnuzMGfXlK
SvkCBZvM8KGYtwDY62uzrpDErpR17visZeLy7xu5gpiQHhFZCGUH6lRt622d88033yuLqUoOC80LZICQ

m9QbcShJsTBHi/GlAgRJkiU3ghT9cxIMkeY9cQDncc9gngXnP5eKWDek9BKyGy1J6JNm8SlGtfT5iEjr

Mrneq8h8jBnwf3rS79CAIcb5CDzPHisuyPW9zcf48/
O+mZn4v2JEWvhyPA+E7zXEO//HPWycvnn43DCwJde80w2DT9a9GDfNPs+Q/1U74b7JqttJKkz82Q4MwN

gW3nTWw+rgT0SOdmGEE5S3175yKfZmKTIanJDe8I0fiM9p4jabdfivZHwR/oNLAtoCGoXFwtsHj9CNq2

23L3/cHYFjoSs+rHpnVAwhxg18cjApeItrjW2Jpj3e
20ozW5UfA7sRxQv+CrU48Ky0Xc57BFvrUXNGP0ROz0IdMQlPXAm5CfSro03k7chbr3EgD2EN0mQ0KmsL

SBGVoLiurQzUp6YaUcAfGuMMipcg7aYFwb5CBbOKdDjZhbIeETcEyTxh8XayOhLHceCEP75ilMY7SE6X

rHiE9lO82QNhXMN/fibnPXgbWdHiPh9ulaY017w5xM
dEcBqTmlNM4d4eeooAcdy4/rzlHOsJG0oo9it8kYg4Q+CawHgKZj4MyUEe8aEv4qvjyUPswZFQXqymTj

osg/N0r9gBFnbpQS3S7kGUcqVFcJP5vBIBDHdxrEYNHDTyNc1mCssCreN91D6nqqVefHocd+YdgLBCaN

CkPKDQ3drX91ulg00mxqB5+zeOr0nkWsrE3v3Iaccl
BA1+URvdMExHsHqxRbs7bsW2mFwCBF43zfQZragcVOFGwLx+Wh7OkurwT5zGTpe1bWZ0TWAdFWxp+mLY

kSWqPN0NGQx6b88U69uoaKuGp+2bwv8Bu4N2U8WYbUsrfdzUseCOoQ/FmQF/QKPNw3thAPk7PrEpv/y5

3MtXlYmS/Y24kUN8scfaenTHDBR6F83ytG6ZgdoWLC
Nch3VwJVpLZw4Y0J8BWwy6IN9+hO+XHf5QODw3BNtLBYjjzv5ab/qUm4f3vv5O8biLdemXvON+IFc+tP

cLjBA3MVVYkYpC+NdRx4y7ZFpB0D2gJXSC/qngDTDriJVFgKfRM0k3iRdAD1zY45nk1WvHxtSQnRjNxD

DPcDmN43gieLXyDPNNCLXuuQqEkBG3X83oQMe3jM83
PhWFo4NNef6Y7UxCSh+W689NQwR4RmF0KBcLqc1SSGMenwBj5F9Wg1/x13ujcN/DLqlXN5E/Z717UKWO

5gfeKn8IkUU0Vy8cZ97ss3JfBWACn01SA0nYRYLSMsRfsjugTJX7fIO04Qox7PAfUOXTdoHPM7yL/QSg

gVDVUMZqfPZR7OZSxDIDOtoQiZwheXdODcgQeOrUKI
3DDwYyBPFkHyD8Zs77de3cvIx4WxLljj3Ad1E1mRu+IoOCbxXfjvE/Yhx91VlN/Mook+hfoj1N0I4I51D

qf8IjLIWqLOgAj7Y/yKBGqJZ4Cqddj4iWBpGRd1VX5oKg/LW5zMgYbo2eEN5e5Ac2u1ISZy8X6AtFs64

vP5Ug3WGv0kg/yd5NXoY6xPTqJvK6rEHwKvioSmaO4
FW4jQlpUyNvyjTmXlW14gUeu74CbpkYB3CdnjXcLKXwF4w5zDVOH3qN4W+T8z9cti4Rc782sjL2BLOGn

jJxpowVPncaulkaf9bi6kV0UqE0+P6rYOGmNvlpcsabj5e5L5ghY1s2/O1fTWl5V90lbBMrofWTZraX8

xAVwV1H+61kYaOc9QG21sfYWhJCfQivrycQbi419A8
wMLBsd/LRRN5nvsgkjZVkKoPTyJcLb5Qgj1D3gkcKF0pHC1iew2Fa2RmSeuf4/V2vQfvdgSZ1/w/1/AP

vdqTB8NBv9UdZp7tnZRQ2QRCvUqKOJLoirbNEWkDrc0J8RIaPPswiD9cFsLUijqyttsGAxIXwKzdK9ie

mNiXYN0lm6nbODu8oNVdCOe8sKsaIxFZvbzftyOucq
/FN/Yq7xjyw1IEtevRvV303cKHM+kXTqq9pHTG4qQgyAmBpZanIyI9y13w79qHG/HDn/AFejW7D/YiEY

4OhI+igz5yGY7v6+LqiLcNajslvnHoNYfYBp+GQNPZPmT0ZuUvp9fnsrbGmQet0EZMo2uXNyD2V0hp1Q

Zb8aMTq2ZebimBon/WJk/lLMV1rxNQ1DQccKBzdYqP
VLTv0tXIcDA2npWQ56RyFx40awwXYTYk3JuWwqA2ZKNpQGnPpLZk67VbSLAkpXuqS/WVP2uBz9iB6EWc

Ek4CDj48tHpRitAcqhd7/bHOVdArmQKwN8buGh1vMuQiuI65mBMlDq58w5z+taAvlhR4pGTsUbMdeYO9

Rcem4G1DMoikytcvOuunN0gpgmmtIUYIKuW6zxG3N7
n6jXwbLrG0vi38kkcWTv2vdY2V3TPtrgOkBuvVNKpOzgr1sc6Tf9Dh1kVpxiOSGQ8uZ+xNezXr1TEhvI

Hnb3QX4knVqNPHFOtE2LyHjlF6OoGfVUqOVEUPnCAetiyu2/h74HPeXl2dlm1bi/ZHg9JVVuZhOugae1

j5lh6Jk0iNcFrmYeyBHe+pyRHs5pDFXus5f6hSg9DU
4+oA5YbjY6KPjVHGzbCD/fVPyypfL0WxaYFtjZrJNBKScj5Omc4kZmJZN1UYhfFVqoPFinI8tRBemcgM

9cuYJ0WSGj0jlwrsQ1JD3WTp6DKg72BFFawHgBKCwD8A+Zm3Flq6B/o05OwgM8U4hg+Oer+WiU9dxc6E

hlwdyZgKI3u5pk89hrTmZpVyUAF1f2SSdlaryAtQl0
+PrZphVRB55UIdGWd5ODg/RwMvZuZigrLZXD8bgticDBBuK+gd3nKYlTjbgsXTOftV4ffoT+6PE4Rm07

0D9dbta/zJ1Bg/hr8pSJqzC0YW/F9p144xbHWVIK+mqy9fqiDl9O33MdrzsWTcERPDlnjTFt6MoCyAUM

NibfBaDv1W1vecSOMVekXXEpi+wAmgO8MMXWaI6ve6
7AzKrW9d8dCOv347C/4L4wUATKFxD73J7sTxWVX29SeH2PXs9o9xk4tVixZoOBivClAwioDfSiAeY5BS

edQaiYX2bWnr+uDlv68uYgM4YhGFm5e44PeG5alakbVmV1W0tg73S8Sswj+Cr9ULtpfVR+d4yHZMcNN6

8OvxT0oyvY5QaLrIBqZ8CLumJeRaFAb9Heh1t5bnS1
kool6pbDiusZB2ySrs7L+vFN5lhtEEYLHG+HdKnWsvpXHYKpgi+4oNZDGmWnejxWhYcSjFK8OCBWKqhU

tevFoCcwDJbfeJBhRisgfr1XvCAYZQCvVodR6lE20je7bNmK/yD8EdhHZXh34BkiiIwEugt1rncwFDvG

CtetfVsAs6zDhW/qXn/CKE89oZdsRR0QHCxNBXYgsN
bnKv+CmSrXl8JcaCIe9reMvT4vlwqjUN44hcc3rEHS8xhH/G0oAC8T/kHbJUW7oMN+uJyqXwmnLYGHoA

nZihD6B85F8zoF5nvhnHSlj7AsGa9/rZa76mewM9IE1/fodolkenaF8e3dhCI5A40E342P/x0l5io3E/

XN+0wCwStV8p+c/wAX87/HEG2v/ggx/9N+Fsw3L5MX
/x/hx1O2rD91z9NiPr0pgRBeuvm0Xqy90OpsFNxDLuZBytQo3VA0tgQuwBphNGRhl8oHE5KtqQf5KFwt

hyl0CuynrCfkhE/NeGza2RG7L9VqHD+vikyp5YZmAjbdSokL7cL3+Tqza4naZfrDMnGXPsQF/zrzbYov

GIV023EW3b+m9oLI50p8iGcQMxld5cjB9tfp/WXemC
0Ixv8ESqpQCu53/wFbkdVHluIr+gZCQTpCA5V+zMrwozs8KNOqV9Y+Ruh9DMiesFJll0wdE2JE9YCVvX

qAsiXL1KsXFFPRQLqeoWQMSl50/VIUsgjLcxANvO4OLcGErBjo8xplA6lCBfcL4VPP8KuO6T4ElA5sBa

xBXPxWR8WHdPQDGW3hipLS9XD3LJicYM218T6Hasmq
M8DAY1+5TBHLtGkyd2buPxjYfVqIA12ohqEkUUEIkyzFhCUw8JV/ZTET6pEOgclKKDYMUrV98UZrY+3B

9LzM8j06Eff44ApBDXmSE3IKmeb/BwgcbhYxrrz6KSKPfUfDSXt1dkzSalKwAxtazXB3wwEVDIzrjF74

ss5ClNlQBMZT5zKgOg1b/jJF8WMu+9VDfvD1YpQ2RP
xIbfV4e+CZoecnt3cX2uXcy9ySP6bzKEgDiO9XnLSIQQlLbi72WIyBfmjR7L2mHyAMluI9yh9lyFMgtC

7/lQM+I5im3mrp/SWxpPytfLJzUWi9WRWD0FvT0AEnbGAT6igEewR7b8vXicjGxwqB3QNIJppnLdl0NM

j0jru2hpYxfKiVNaGOtXoPSzvjT4E0DLeGvDFe2TKj
nE5UuH7+rsxbj5dLzUvH/ax9yjM779duO+kIUYExx3jUgqlSxIdXZOd/r66MIO3WLYp1+LGYIKrxxJwm

8E1nghFYP5Dw1zFLFAwnf1+lpy1q3Zb6jazfJ0pWTWxl8rHPHvrJhE7j8W/zstYYQB8I/2QTOBcVt3VY

wJljBheiSVzDUqb0wYiE6rOsrapLEZ8mv1WIALiaBY
udzHmJHMjMULP1lFr2vsFyruaj+Yk2T1Wt+wn94l3IY3ECsd2WYUTlmjYKMMPtaT92wZQSYm3fKZsEUj

MJgGEs7T/w7iIxbPFRM1oNNTHMbWjZtFeQILwRyfzbfPfmiffK9H90t0GPZC9l2VybJK4UIh4LPvfHPY

2CiQxOTuGreTgsRdYW/bGtFwtLG4FSXizOdvXX+FZc
gY46CZzG+o2kSPEmUpD+GCp/r6V9fYyoqKaWSVkA/D7vk9W79xkQyarT8XFBPL+RonnwrENqhuSbHRuq

3IRJf4SLnqDPBZFtPcu3BGaUEzU7N+84plI8n2NlZ26UNz6dF62Skgp15pwdNHu7jdNnAsbp2PqRR5UE

L01PKIxBAu8TKDVM07vRPsQY4XtNcjnPiVv9dDS10w
d5PYsXiV54+yFt0C4wybK2dGvvAbm49/hzA/H9eRucf/NTCbS6qeFdTdR3Fkx6WzdXURshsBaVo74Keb

x95II9cJcNPgMG+ybaN+jq/SOh8WN6avUvAR8qPSJe2jKWpxWT0Yn6hRE4CRWxEYcB0rG3Bcc5q6RpIm

fNys66oosSpIfP0XrvhRCT2dilwKGDoyusyA6dYzEu
v4f9LV9vOgDNaRxo6V3ghw4SCyw0PxNVKc5LzpxHyOlLkoJ5UdN1NyeXFF3LSLhkjFYysDVLcrfIAxgC

cOrdBeN/ObgHDcCA8uKItlkx3zXAqhRELiPUfO6Vu6fJJ07wxjA8tg4OOXoS2L+SfyzQi1kSvDHfqYnF

0aSY9P04y1EFpf+QvndC8Wl+njCUJUCkQqYzBDBtuW
Agtb5x2+AToIhajuNBcOMIXohTowq9As71+tSbxFOeke+CUCbwbVyWavCRACCtHEea6XqyNQaUHDauxN

8yA+A5dixP7A1lp4MGMvvMqV1Ny6xAwCbLrJXtFo+MQ5KB5g8qRqsnkIC6iPLiKEbSCzTN9JLvLGOXUx

oZcIjU7LTB2u3205i4YlItt9rbSQjy8lpKKC+fO62F
peP3IslefQd4znLh9jW2E90vWUAY6j8nABbx1BBar4iHp9bP5+yPe2Y2iR2i+FJt41928KG/4AlDhcFa

0y4Fhznt2yEgWFJJBGwzE1CByuM9RINpvhKTwMZ23BIBl2KobC8sMbx697fm31vtdAGWUFeIfIN0plSu

mzko4w6rm2WgyV/jCdToGVBsEZgyKupvuUM4Kkpxle
w3SXhu4em3RcJm0Bgdpyau5VLuFxJD3L9CBsNsFiEwtwcWOFGiIx/jxpWXWpNGrxAGBoScjHnAzruMJU

udrZxE6+vk/WXIPN9Y1W3XftLlwzGjPtKd6Q76r4EL7dtASjaUtl9DI4OiSzeGnMZDcQtrL8DkXuHaaM

N62u9xN4Xjxl++QopyCnLHjY0/kf4ZLsQHUgMmJVA7
qtIz83OdTxv+gPEulesAj6uR5B9sFBfL8eHvZGtoLIUEDr4/ztLEDDuCIDhyNP7et0IDDF2q/CpD/Mcg

mQic7Nazw29zyX7m+wsAXGf1dUF2qXnhq0hVY3ZYsIobmWqADVmjDwzjRSP82oFwZuiK0XZOnN5UDLBe

2iRjHMMyyGKpUEAAmcwqjeNcHo4MjfoEMVDnzz4ttZ
x+5m4spM4waAjDUBJoFuIme0zf8ZLYGCVcUG4IZCnkEfKPG5AGrRtCG0sfLcwL2KH+UXs1n9WTWoMHNk

NCbWrA2OmUQMpDLsS6sb+W6bBBhQY6PG80/VnHiwtnB7Bj5URBXj+7DHilhrkMQx9v9e4W1Ims6E04AX

XknjWug9BnZIP/0A/EWutHy/9ysA9bDMMbL+US1Chg
9oFEvQZ8CkMtSbV/+K3byL5MDuaSpOzPhk+sRcZ/bR5WkYyeqOa21KwwtroUWIQcMHLB1MT/minnalaF

y3IgeG2vDKo4OUrllMjYXfG3Xn2HNKy6gW2GaXgzzT8RhVcr9tgrAFanNDgqgMk4mQ+BrphE6/8i5Sb6

xSRB4xCFhCk+1+SUeC8pTYvSpEcrAEglfAw/zcB/R1
ZOo7xfyTWbKbXfRiYH5P4gn3W7Z0t47+abQBmCp5lmHeUl9dI/9YmSa0jCc+UbQd5ghJDQMIfqmFI8pc

Ys7HELqbkAyfslo3lojUFhA9NfvsjxdW3UzVuTwzCB+x28mLNVg5ML6o3UPIgJjv+YmmOcAB9TA446XR

WFTrVFoHe7pxw4AqvHE9HVhytkZQWla73rGnzOX6+A
xY5dkubTsH+r0s0QH7Mjo2EzLEM66mztL7ICiPbyQEzqf+EdKshyOomVsPlSqFtRFjvMQbvvGJDK/Vun

pyRFkhVpQT+3x0SD2h6E8y+0Q9hQH0G4ZWAKu0ir14R0ngS9VH6uFfDP4JPDA04bON5gnCCaT2fxx7hd

YdD+1EhQYo7NDOAAKxvxhcEqm2qxHmF9dAM/Ea1ZL/
5ckL4OMT/kzf1PTv89Kn1dUzgFxGP109twiqlDafpozm41ACmEZFIqu8It4wkfqUue1Yrhfct/d9wkVY

qsWbazTfBeKW3w6ygaIkYUFf/ULhEWHmY0iL2QF9Xe9qtIkjgokcRVNUtTi4UTgnEVHyFmB1SOk/eU/W

g6IgWd55ppk741HJySerjU3oS+xYqIwhnNfcFPlqic
eFh5D4DPing2Ef0T/QK7OLd+j414FtoFdtCjrJBgRsgKukIhsIZW/LQs/0SFbCdhDZRr//VLl4kWOOfb

NpAK7NqLML0SsmesSlowVpzwqf5sQUDffDZqYI/eC3lb0t6bXsBzZk7eW4cQVWUR/5Q2gRFwhOT+Jtkm

5JhDQOYrg3U0cBuiMQzlcKV/Vur7z4e4MKRNK6KVWN
6kdpr4mBNEJwarD9fAfCjIMZkS6S2q3+jXfLv9hqsHV2aqmsjD1Dee40eh03xtyUDK8RLRTNYsytxPrk

m9wq9zwKvIj2PQHeoYwGdh5VZK8V+jFpmWsQ68rZgabgNV8srku/OFLY6ALmAt8mViWdAbbbQMh4L3VN

IOALoed1TEXYwi6qltDKfqlRBPaOC+kTd/OJTBLnPJ
UHOa43UYdJzFqi1JZ2QU/FIQhQnBWdGn2hE3Zepv82zsn3rqUs9k/D+ELq/0cPhC50O39wFOpiZLq8u1

vnmN9Nn52055GML56vfapNMwjQHvXqNh3kYe4KXWd/s5vpAZ16Wm1meXU/+fj1X6+xcJgBK55ZzuoTRv

7R7go/m9+x+9mjH3faRDUlzSZ01HyS7MUUz9I4BPnu
NMBgH2Y+xWLNecsf5O/MbQX2N6WTjkUmgjUrcYYIfoLCc22skhU5HV+vlPaiZVCQJ0vmIZAny0RoWq1u

0f5gxCeJbdzyri2FbsRKY1YD+A2RZxss9NAg2ZnbyTUjbISAfJq1DDr3uEeLxLM+6l9U6iWPGDxE05Yk

Zji9wUNZHA0j6E6DM3u11fb+GvaaAYx9k2oyiKUyrE
NSgFsk2mGPNoq4DwAKVu3We0OkPibE53WcSqpN5HjyXiBsWvX94wbFfGFMZsBxRqH4puTxNZiLqlGzZl

PEYaNMWfEE6h11GKFWq8HGi8TNkAOuGyDub0J0Ej6FgWmkDWVU9Rv0OWe63MF5t9XsMs/BUgNznJaR/L

7qNjpCGA3Orn8GrjJtRFN2GLUZFZnrNHux23zRtFXw
DPPj13VXoAToRdcDmB8WIApOJ63vg6QyypHzNhzwDrEO8IKjQN6MkrYt2FxtbfXYbOSeXI8Ac5zu+JrL

jH6p65TKI01KEG2uyEIJ/7r0VZkW/0rALKN3Q+DxA/KB6GIV9x/ghc0i1818Qc5pLS2r3hvAC+VzlWTu

XpXMnftm3akB4mifm/w2BMy4YJf3AdngRW6Ba/zDKG
htoMgKYproS/6smQfuxgMxTsfrhXO7r9vh+6Vnq2KOMh1sPNAf4BBRzD3UZNi9bFTiUad93+zdLCHte4

NqDbFT2UyaHG4OFFbyB3XIzvbhmFht64Nwns+DR5EBnAuNNxztMuu22y3vqkmgaZIFdP2clviraZsxsv

eGXnmzm8eLemPUPVTqfJe+jo8OWTIHIvhVlmotMY/r
gOHumZz+lg4H3qNIrUkTmIDYLsT9d4HLbjJsu422PKKrRvvo55PImAL7QVT3rWLHxJifj536hs560UVv

xV98JZzsBZQKu79nUc1NHqx+fTvZ8yaYo/b1LgucwNnblXlof7u8Fg+JwGQgFJXoGm+4Cgav7a3XAq28

bTr7vdGsjDLiEZ70IU3sGsp/h1m6/GkvqSeD1rNaps
1Ozh/e7fkrwV9RFQ+VVbmrmsB/C7R6cVXBgzLw+6NNXZzQyqgfz3Z4lSHpEI1Jpyjibw3QXLY1NKUPGM

aZDmrW+SWcEdKz9S2lVstIwcuBUhsCFekZQxaLhxch92iFveJAl2QZt+yMJ3iD06stFeiKWVAqYjkoBz

6jdx03tqHV87pvs9M9DaPwmtb9Le3TFBtpe10X3C6h
+D3baLytg2pyYWZjeV0by3P1d4FQ6c+Na/yKbLoV0FLKgDVd0XJuUWwJU8QupaIrVofWS4kuwVfYPyMs

vqU2JcHgN+CwT6CSMU/EDQjJB2MecRLiOTwfB1btwOaBUMosaF2om2qOC/zzjD1Z5cGawwlFHSuTseQ6

LGwMbcMGG082jmW5LYFwj6AVpYdkFwAj0wjOXMk8ie
eTvx8GPnOzrOJqv4XVUVA4mv+WjjVp3cX7kU7dX61+EDPIXjcHO0Gc0oRzeNuCkjDaBZbboO00aamTFI

DHlYhyXUEJcgUQXSxAe85cZO1iIz+zWFCXyu10/ygXl+QyyLplFX0C1EadFtJ+uwCd0Y+xV4lKxeotdW

hjsVPuR/Q450u7Ninl6JJiwhWc4wRLfYL4JPXk2luB
FQNz1kKB0Ez+NlmjZOr5RP1hGRO9N6WGinPIQkZ6qkWIIRibTf/auHsrePXipQqdxD+4coXdQU+KB5vh

wAuyTgDIuMgiiqzeZdL9nS33k0MJRXHQvmV5Q9TP4SwbgIugBwLUfVqneeajvyztMdCgX8u4l/IRZPxd

i9TFkpx6WRqakQEYMeoZVqMGRQTjodqs7zMkzDL8Lx
XE5YfIFcO6QW3ppnIKpGXwefALCbaZNfzC+3Ay569LwOwsEg30wJEOI/VRgJ6JhCoOiWM4PuxSMy1YnS

hbvciNo8SAy75GE4625WXj1C/wiEk2xwuMjsmof5Fys9+eAb83zB3x8zgU1Ej1FtxBaC/cySy7XF8FzY

vyG7rrPMW7LPmtoRIF/ITxC2yfybDEZVfV4UjjcKCF
33g1q+kTNgzSwsuRs9lebsxSdAOVUA9CejGuUeaa4MmQzuBgajVakgiJNiFZ6rHfUaPbxV8BX8hnw1v8

0ZhxMPx0ygqFVmv2JRlbR8IIMydaxfFi8wCb365RPO7JQCQa4xn3XurmCgp2naPSpRlrdzIUHsl9Lz3Q

AVOX5T5pb9RRs4eXUkoDYqCyR+XnpB2PxXUm0D1iAG
vtdOZnXlq+oV8pqlOS9GM3m+AlLQx4h+nlQYxsFj6wwiEemBtCpeI5uwBOQ+pZfL0+hzWLPf9yx8nTZh

8JY98AedSZbZLVpYYfdho968YkaJ1N0rjuhFPGuXXkJmqf0OKV1liyjAekiWa2XYi4IDw9ObSfFQkPoG

UhDaVnlghGSmo/BzZxKxObbTAYFIfeHB032xLQn4iF
XjhdVKiXuLKaKmNKx+3f5JO0cH0dQWeHbi1f8kccuNYRqlbls78kDtNJaguArHFiYXeKCAWaegXzhrek

Kww9vgoVFIDHyfDYCMRXGvAu8poKGbki0w8/iBT7Q3K8LU/FIBOxrJf6gXjUNNESmip4gU5Dwkoua/xO

oujtiVH6/sGSNA/66rh4ES25yncQW48e+YkU78UiTV
y+L0UYSl60bEuXibsrXhyEIJfZwUsGyXefxzBBohZT0YRGM7k2uC/QxKHo9aVIr+mw/IorpwTyyMpNwG

napyzPmsL21eRukEBNx9ha3apmh2cq6Kzh6hwfPRhkBEKHQ6TfbbZymAxsqQV9A5KaXiYuMPrDJDSwcl

dhvVkOILpw6ewE7og3tRYPgg3nsaD37ZiguT7vaypA
m6skZrolAcLU9cJbAIgDeHkd/GCwY8loQQGHAUA1APJ9V1SWUB+svNboJUBoidrFtEWYnvOw8r3ljW5Q

jWYjvWhtW17yaTX7UN6cN4yNuE9T5cNipPrmUODyo6o9pHAeebgElbeMd2PkogrCOI8KFXIn+E7Mkx8b

OyctdgfklCGtWNyjxXiW5djlYXSOBplYLN+6rWwjmG
qAXEPisjHVCA2cNYClCHrDHRsXCNievMOyhtaTQ9KVxTjvzljDMC5YZ7Dkp13mERd2MyOchNa9nlNE5/

C+Urh/Z8GpkshhwHEEWC3+B8g28bGy2rUJIh48ZNyA2rMMJVdRsbe7cWjCgmGkJZs2I+Jbn0X/j/mTpX

/4k6D/JM8wYk11ZjLJ9D4G7JdY7mTFVsXzcxkgaIRw
kyMuJbSCS9LQVYSU6B54rCGh4X7ndsyv/s3uCUUA0El6CW8zoMPWRR9BUtiwD3LERvoPpWR76qRLsE4O

91GE+iV2CH7Gi+CTCu9lfqq8zxkpdOfwmb0vx4XEhcf54Ifg4ykYMMTaz0CA7fYKiBFfIabAcxXIuMdx

Spg4tsrwmSDLZWBZ2X1liUZCZu01HLNmYMZ5nV4qSe
S4YBO2nmZM1w+D7XqyZIj3szxLqqYWaeTs3r4J1llBALc5AW9Fj+9YCxDz9MoGN2UIL9cCCoRQh+UhLp

tXpaWDYHxSJP5eVb0HaJGgfTdU30N6r8Ou3UphDJfdCMEfPRmLAccg+FHhXq4/T0euPp2U0hSjNRg6F9

I3EV+AuYqXIGZSoOfOfTKyKeioS7426oEyqgzrjMYN
3+REcol6wNgSVp/9/rVakWv7ELVCKZEXlQrYjGewtDhF/3387exq/oeRw9CMbNk66aFzgSqf7l2r0A6w

9vDCwKzPlGHl4WS9Jvq4WnVrphpIMtZvLVsVXBEL9kk/kNGOeZvIHqn4RdLTO+E7lI8Wx+S6B9s9c6zz

zBIQXkX/uNNvKqgCcHLR7W5AC1qJNwnaxccufX15Ww
rUvUnrnkQSYoeVE3e6xjLv5e4rEumw6383TvQa/VWwckHHyWBysV+cO36dRvG4Q9qCKnAw3ZPdIUqgoK

op1jIbGf/GhcpAFnoY4D05auhlR77Ay+YMfJ+bkhXrdN9VDIsDV68a2TmBQE2JS0uMrN8m7FQyFI7UXo

Ta8VWF2gi3Kz1m/oQ0TArkWY6/9KFh+OIUSq33w8ZK
nkXJIcuexMI1871N6OEzz2Gma4IGW+tGHTDDj6OVdmuBjoZscTPb7W1PaOq6nK4zb/XX5VRtM304rX5R

goE72FsbkLsrZx/RDR1zlLIoakwhkclgiBwqwBF9yKLfZAn6flWF61tg8uTaSFXVs6JiZQODgCiJqmFM

42NUErNJ/OiVO6oGXfhTIUMmWu6lhjq9Q3hnQI1Kin
qc5b6npe1kdhhvqBKcteL4L+w1v1tynb+Y7WwBvbZnuB7NAScmUsKR4ZSkkv5g5v2ifBeW0YCl5TLPoB

ImOUrpZpV7pdMzt5ZOnKExj9uEOrKYo04B4b6iMYEn5yNL6VTsTNcDJ4tkPsXsE7mBqkwSmSr1tDycQR

OZaNZWf6id3RGCCBu7EHmo+QCaENLkUGE1cIw9iULw
wxpNOn2HL5ZO6q/n10sU+1bxQCuBjwkz7PkdXw4MkWV7xE7+8H0rKN1aYY+SLmbEVpRvj9svi9w+CpVb

WBI5fm3KD0KUKicQRInPMuD474VEyIRmPGmhfLLf9FbqgSer9PdYaRF+0TIdmfDjyW+HDT1xA6gEbJzv

wl3DUJKF0bVclOAArmIBFZLPSA2sv50QcBg3Njh6k0
AJlGvs14eoNrOSoxZD26NQCGLklLd8FtSzLh2UYFObvO3P1i5sDoY37c8z1CfOchXMkBPrUsyWXWoT6M

1wP3UWAi7Ri/Aw90apTAPBITd5EoV8Zw/iNSB69mrAF/NVrv/OV+lc/0dCi/lXOziw1cRTjc/+cWnK7q

kzUnkXPNGHrxGglx0ox52elI94RTQ488Q1RnqcxcIb
Rl8CAgMRMSKEqRRe6NcuJcnaPCowk6NK/ixGfSI/QfSOKeVzLONt8DtxUTgOCKUrNnVOuPa4ogC3mHsK

67vasw90FWL25nJAdQn2wqvK0UjEAxJBTo0h9lz8mOtsQshwinCMFV2y2AoCe7t0r+5Iq+Vp4wV2roPL

XoCBfhzCDN3AtH21gaEl4Sh+b7De3DiTnuEiJxk5dc
294n1DMypDSJXNRgS30jdMTSaf/aL3mxI/EvsKPu+pa4Cb7FjsKa5Omhr8FYR5EgYjGsSyCjvMg8qmLK

NlQTAGDVfeuyHwRkDYDOyxacXSZqndeaFjuX90q0hiskqC40gg04svrCyx2EbotElTcnEbO10PczpU65

2COg4Y7YtpptBp4hupj0ERADuCSVGLIg4M4F1rkS6l
dJ/TbvpyS0xMW4QuY3nl50Vd7iK6f0AhTQ+v777LlxZ9zdyv53I9SF+jiS8zycor2nHLF+0MbRlYsg2p

5NBtS5ZtfYU4stHSEKFwnxlM5274D6KkbPUWIF2SEfNyaVXnawOUeltexCZdBB615JyBg7qZDyXiAiXE

sNPyx+rY8IbMxMYnQ35C7Yt08P3jHre9fOvsiYZWwy
M4eSpmj86cS2w9F5Brk6Spsqb1QEQC8/Ae1cOniKq6yv7iGf0yO2r9wur3MiFRRYVTDjMlooF4eGwE4d

PNdzuqfxJBpY/+gykC/JbB+mT8r5DPZNgfhAL0ot1rnM98IzJrFYddy7azisKh9IVm33IunWV2Cj8J3j

a9TbeF5GlFTVHKAohMLEuf+9b3W4WQsy+0aDW65H8g
cJbEOyItvGBxzeMmoYt5Re3asJAMXOlOoppTo+G7j8/VNIFD9Fv9TOevXKDpKLAQufw864Yz1sja+agn

N5WQRa5mUc2XDVfk87KWO/LN293Jd6zNZfTgKW1BCYXUmnTxNHiqHHBjsMurGjjQXKIZuVq8tLfzJ/EQ

iQpa92WdVdxhsKC4GZGqcaATFd+yNquCdbYCaihtSW
fpjccPVnLf7pRSnV494aolQJqM55iTcqiF4zqtbMbmCZ+yqH6NdlxnwxHyDYtdSqNSpo1l4/n3EOLcIA

Y2UHxqUruopAA0r9dRRo/6r7GVe9kwjaJRMqHbN0C6zD8eB4ubXWjUYdKzKPi2GS7lKnbK6/P6h3Qs1d

pVxnpjAcvMVygmusyKFYQGG0xIQnQpIWD2ACNWtpmT
cAEQHGzpf3YrrAOdys5v/k4Z3W5KE6Tft4073spkRNpvpX9nv7pkwyXFvoXnOI72WLZDfA3lFH1GBmbF

ZWIFp1J74z9dDhjax3chSJT6n9ir1I3NgA57HvYlcw8xxWGwfA1v8SbzhkcuumIFnCjQAmRGGrM9IJS0

WmQlApt7+6zOnjl13x8iOxw6lbI2+YnQAnB959ArJR
Z3kp6xCCMWJpTZjzjvE58aiavf3lerZ5Y/xXg+L33xtf0+T3l8LcCu8JxRldS6Kayc4Ydh0wP8qBiKq0

0U6s1eP6p3irQABPKZUrSsKa9iBqG4q19h96Rg0/Bbnk8sBXPzcNzG8iS9sMFVarqmAiShs5zzd2YwZg

xIcwWOu57qHGa45IfdAvd7Yrt5c2YGoaasDsg5y2Lh
wY5SlJaIq5KW5nprN6w9bBxrCXo2P7xDUs9dkOMZ2Mya1LCipGF87nEliURVL5mugSUrfKfhdpGgQwqz

dJWAlxSmmzJ6QSHVHzZu+wpEZejUaBsWTSbACSooaq3UbI7S4hIDh/zDLG1bYjPPcwWWULgRLBbcpOaF

E/IfnqgooFgQB63aV8Js+fOLxTVjzoKo7BwFSQOPYv
rshk+JClyHv3Pqv3zbmSm+2tOIX3nur/HznuaRwyE35lduX4eriPkVs839bFqc2/DvtDk5fQQRyrwZv1

TQeqzbBevN7ylPu2o3jkS/EEqtQ8oga0wScoYuwMGhcVMf03gJcbIy0SmMmsVG5hGs2eL4aQ7a2gJKBE

REtc1YpPK51jpUzeAobAut8vbV9uBSTEmRJVCq8Tnv
0Uc7bCvQMD+u9ImvRALQpY2weCUfLel11rxFtMllVOxDacfcgIHAxTXn50oM02HLaRVnMrGdGRCvMnrb

tjT36Ds8Ji4rh983avNq7trx69Q2Ix4TrUDls24zAzfLzTN3hx0YO96yefjleZr86B++Rosy/+dl0766h

6LV/88Q4f3ElQ9LMDsZl8gih1/Ya+m69SGMSn5IrbF
LoPIsGC9Ysy88AWKCgoZ5yE8zfIJaBttW4R0+dn0gpgDaJ58cmHKB/VYktB8vThsO6ySQaht56LXTNHK

vofwBa+dJapXpagCr5EZ+vUu2gff8R0PGLkp/S9w3KMPjk/UV+ByB7QiLDcdzwtuFuxq92pQ8Jw7nLLO

1Gm1xblMSQLNvqB32DjxVzmX072NU6fYFCt4jSST8X
RiW1CMTcSZHYGmHmbm5jvnBjArxRc5rZ/EjD6E23+fmG8Ef/xtjrlaGF1ayJpiNiQO8HQ70sGu0pesRc

lOIPALZst+7yr7o0ldaW4aveYuJ/Dt9/9iqianCQNNagO1vh7rpQq7Oc1XDkCf+xe+6A8MDhzTI5gwib

PJnyEmlo91nOwo108e9+8+BjXk5wqvNbLyf3ILu5Ua
TFb66g+G+Z2vwAzwl+PqN3p5RMK/EUXkCatHX8Hy6fV8j6SAJmGr1Ho5daXElytl0SP9Jx0O2ySy5THG

+d2/X2N34Oc418owac0jpeMAb+mi+5WorXFUjR+r4h6kVggCOBdOisJskXKh21FO9LyMJIiD3PgbPAB2

CZ0siVJ7K8/qBLD//PAXPuB7BfdRixI1Nqj/z3ZSp+
AJ7+nrgq4s3IYz9N+txElrfSJxBfo2hXzb06drqj35Cwc3T/s9J/YwmDWuaaWzdzoEui7VjL/8srgK9U

aGynbIq8fkgzY9UmBaWqyKNz7/56mMzqp4JVYFU76f5jjBbGkeUnH31Kjq4/T9oMymd7GwQukJflrM4Q

NZrypZJ/gC/m3+N/APTMhPb/AOn1B2O76vg/58+2vr
9NIAO/oPE/OwvwilUmdX+Cw1HhnjvW2FiV8RXY9Has4feoaUJyVdF7/bCwntdaoFTh9fXksh9lqhHOT5

UFLvdiKY9X+10Q1Q2rbWstdk7+lbrHPN5nNcGcTw22UOvgY6lTBjNAUqK2/giqRVTR7p4zskVf30ae90

wvGWXk/+rOt7+R+Tf4b3f/wZXvKdb0Eqc84LxLS4xV
ivmHt0Ws5L994Xl6Mcl+VkADgK7Uyc985tuKLEIzTsPapco05qhHPJ8WKRph0tGUuAsdW4lK4pJt03Oc

wOeUC97D53aHqqsue8nZ5eGfo9Zpip1SYYhgVa1M696NC/hb0s20jtenaDplacJDBrtdyzhKm/+6Ud0f

gLf/WIi/4vKT4FIVm7XUx4IZ1+oZrsnWc7R1bw21mO
HMz7t3+ieRhRw6F8vAeu/lUO9YzLjCQjAe9kMymc+ZNuwSTnrbGw6lqKDRAOKmxP936kHbB35Wno62DU

N0MoILci5hN8ucuH5GbH1gsg0S/gHTnqiP6vKhbvrtCyfYANAUdgKPDzanc+KWG0kJ/oYy+zEGkzK18l

4vkOLM9/RXgns1nHhGmnZQZJsrtM3/E2WnmtjSn7tx
2HHcWknvoK6lNryK6LbO4xkXLwysOBL2F4zwVuWCL66GfEufLpxsRBjkjPUxn22VObJVatpEqYx1UWzM

rObHNulouEx89a/V95CVwrgV/+TnGdH/3HU4djdb1hqs4F1J+pvBm0ZUSsrsa8FontJ9QruBMtc/HXeH

U/pbHQkDq164oujq9ta97E10ZTyyrGZrw5+bL3nbzu
5L6qIWRR4bYwQR1v5xe+aKGZ/K+lr81/1lvLx/uQQ0Nd2nje84+JehoF/L/Jhony/efujy1oycaOfmCiFi

0r1rH7oWYwmzd6y5XT/nKn7Po0jACskLBS1vqm8+2MVQ/U3ZnFlqGk05S+Pf2oYed+enslCGf6Ofsey2

FvR/dQ8Hoq7fhbsWiUNmto2sM6eWvgtwyt5mAEUOnw
AQz/pYvF+N/i/+Uzv2kY/VFQoddo0lYgrDN4ofrJek/XB6Pmr8A9bgLPwq7RrFTySgWljMpgppPe/fxf

GLstp9CqIRfBpSD084ySoICoeLVaMkOnJpl4l8ubj7O3bo+G/ZEyp2kl/H/TIr5ezvNkr2Lp3hZ0s0A9

u3ufw3QRh9xavn0Rxj0sue3dG12igfKmybGwek6erL
Hu0z6mAQpo8sM1e5Z3QFbF7/nRV2miOfEzph0Po52GYmyaZhHAq2JjMKaj4qt7d3oIZGT4qVYwDO+Ra3

+pCg7ztQ8RRYNe/paJxdjWS7l/fhcH3BB143LCMb/8CLjAPcC1lYGiozC8mYfuyWNrWB0DCStq40Yqpq

81/8Ej78DqSfm27lxJFmPq6BCv2wbt+uKACLU0VqjR
STDgcvzRnGhyiUQpC32D010b9H7ONoARCzVmVnTzPC5aIK2LrOpzAo+6UKaEEBvX05BC7Ddtnl5gEH6Q

YWQyeWDz9guvi/ULRhjjDpqkCldFVxdA8S/64MqvejjXLKKy9WbhKu48vTZe+d3D/yNU7jIBUw2c9HO+

+tvn/aTpD06mFWsd/6WlhuHe9S559NGUvXBHkCYtbL
euDMHjvnvkMgtMJzKNrleLsstYlEDiTnk4I0CkY2/0+X7P+u4Z94vjzt45i/uPG/a+995rX+ro93ykLa

o/4qj9Z1L02o3dkY+j4KZBlYjONfElTaxu9cWBl+0V9t+FEYT+LIxQXFWEXV+XW6/6fHvLQ3SwUiquhE

eKnTG9uNm5AsgX819cqMhb9zt7xkvmitT/7+gLWkdJ
UdL78A+DXT57mKj7wSrfUQ7wQVGUGR0utF/8NWzEpgg+Fzp9OGGg9R9oBzYrLfupP2XQ9Edxg+ES1I8U

lEpNulRP8/62sq9//qQbTZVBbY0fzwhGp/K+O7/3R3WFVCMT/2ei145CHX92t1a48ber0NCEqsBbqvj5

vj/lYcNOnMUkrq0tJEcaq/rIy8LJii3P5tfjZa5k5y
Tssetp72x5AilXJL+DGcy0P1m4JpfVb/sX/S3WMu9H8o4pFroraH8T54yT39J0Yk3E0h5W8xSwDIU9/Y

u8MGk29tMRV83S8Ot7wu3Rlm/0Jxn+lhFNOc1eBsXIXkypp8jv6xFHvZwRzjcF/2GqkY49SZkLv7hI75

z/7mOk2hfa0FHoEBIq+YYX8ec12n5qd47X8v6+uEHg
VMgBbnSXrLnWiJHRsjmsqhr4VVCQvdchbEoBBh9g7g8Quu1wMpOE1LSXPDDrgXUqpW1hzXZ20kxxQysD

ytU1/VrF3Ai11z5RlL/+jOGw0qN3VLQygsyqng9kmofpaUx7lpy7rRD7/4zE7X/Ayw5ifS+JBM7SsEo9

d74ZnsMpJT3BLyFTjYS1C1vmOs0Fswm/f2uTd1zu22
muDYqdqC2KXEgJXlgm+dLWGL01IJ037holuS87+H8r8ppsFqAQrosdEblqeO0E1/8KK1iwbf/0b5iLL1

yb/ct0PKaXaY1M+dUvnPLY4IODPpKEo7KD/YrQVynicLkm3ksc/+EZstrKNHrbVe5VNk9iqQjely3KWw

FIrVfJrFHvyltmVggMD8v3WMZo6Qi1UF28LJtEdtpV
hKJDiWUP1dmyIJu7MHul8AexLxHDfQm0bTJ/W3oUKNdDvF8BoE11s8E/CExUwNgI/sK7zosXnzftT0+k

xpbxU+duKurJWVOMZlXGcjJypjfa+naUF5xseFZzUEqOz10b8zVu02yWF77VOhfa5pCE4ViBYBv95gou

JpHlbDxrSPhwk5Q8m/hS3bMq8GnVeq03DWy16kCNqH
Z0igEzd6AhAK139uMPpdHxOEfBYxSYZ6ThseKPhFaLIUm0g3BLPac9FCphAWTA9RArq8VeXaoMVXLSQO

V5u74ScU4o+UagJ8ahmAEw00DXXUlGIFF31y1S4OYx95qv385+UsgwK9za+HmPLB9G3qaEWiGqeZHePD

hm/eKtJGrwPkS9+XYLucPdU3GNAOiTWW/KN2yewMEW
dIDlCGIikvtE8MpXsqK11MZzjx8+d0+7pI4BxCrL0J2rxyfcuZdkdtefKPKmGcsyrlECtqAhbWZ+aFt4

OcLuE7Eg5HFBZa/PYyT4NtYf3OuVnUxnMIj6G9nGiNS7NFDSFrMSmFbjaVWIASKVvvNX7zTvWp71lnCj

IvvrMKtYm6rxoK1DgxRsGLpNoRlm5Fjg87ejI5fcIz
N9TcBqLbWEvs8D1y8MnaqVcP49d3D++7WRR9EwPwkrfrOfgirZN7beorOeMD42dA0HibRfTxcoBtIQ06

KF9/yzzztEV84VIiB0YT3W/iO4gVASzHViO5njnRlNBoHOpMEzaNJSHKnKSUAHeFLXovchRvRkmThtJg

0ZJi+HfAH4C7Xb+RGGGGsUh4crQp6XZZrcrEegnJjd
HMpKoEzLmnTyDPk64Anm2V8ogPPKYwvw4sWzO5+HAuj+7YgMR5+2F/56wHhmdETNwLoU1p6UuzUouP74

lXBi9iWRlhJNdRFmqYaFhoiuGFTVRFAPq3jPt9s/ma/nKSKyUfEGKW9Nx8k56Q60pCZvaFQzg/4QYDqS

QJtuo9X0+67/MjC8FDX98BRA/mAhVU56uitH73oZuj
NN7o+tLWKSFSPlTLk0fQqvZ2HNkdJG7vPsXp9j82LpH+UPtRKTScRKCKJaruH1/XHj6l5fgrJoZQF2UQ

XsrjYOmJCKJ+IR18PLN0L3ZtSs4GGfGBWO1euOWimZgbJFiXSWeahR7R8bgrlN9gbzO2PnQgqyDbMgKw

PGgXtufbK8ddflg6iPFHqYYJnjnKOikgHmLC716OaF
L3vkUhN3b8fXeTPrnf2ZII4A//I7WQZoWICiz9Pr8kdcLlt83vxQidFOKSr7JNZoQD9a5a0sk+dkGDSP

VNf8AUKnT2chjewUg27IOxiDNpxHJEeh1yzORsWc8gOW2lY2hhwDSC3cJG+jWFUcoO/Sd0oVgt6KB7s2

jYo0eIOEh3XZFqaJ4cRQewXHXM27q0MVv6mWGeu0br
Gv0kJFiURfOb1WX+P0xubgFlf1kaELTRkrbu7IZQd+jHFIa7HkAOTWGdr2FGTdiwRN+zxo35JL5jOgev

kBy6sNDXEC67gs6sZLMVpLhukopia4k6cRiRhy5Kzl0/ykJoSSE1PZMkl2pveZ7TnU9/Z1r10W8B9+zy

Agy1EjivSFxZRbF9Z1XsjytEu2mtE1mdI9ovGdQIVz
pAGArPVOo+hYj6qIN9C3aXJleGrsfIU9NRq1HVyocmws3SSO4HtPCfBe9jMiggTYplr7zVUZ6akmXUSM

nMJ4nE1JOepsiHORR0YGTyb5oaIyxYMQj/dqz8wJp9r7XQhNQJP5KzF4yuUSdy+7+ZDiIHwtPEMDGI5s

YLOfOoaH8Sbqi9Ih/6ad7mfiIH6EpRUHrtJhRox3US
8lO8gEQ4XkFxvclxvB8pqe4Qku52pPwc9uSkYg2wanhxRmd1BkV9hJGossXBh+cCmixHhE8n42lxQ7SR

TOpJ3mtu57jPcapRt0JGlBfUGhPJMkzAIHSyc8XTP5WEDUTdC0SloMmQwT+JLycW/XSUZbFzzYJffNo7

3JY4pfa0qvHds/e2hG2Hx2DNEw5StzN/lGjrZ3T2Vl
LF+2hb0E4quiqOPiXPohrL1d+NiW1OdAuOtN66IxC+E+fZLjhsYqibhe9k5UwXXyMH5wjmii1vt4P0C3

hdWYWv9w/somo6zueiqWmB7Dreqh65RkdwaKXd1mR1T13qUGPDi0qKR+3snY20jmkuwyIusK6AjT4woy

MbNs+APuHaw2oLJhEJYQ6JEzMaF/VFMzRYocen01DM
LQk5eIfklJvvFtAiAKBalEwuD2Pq3Q1ynR7ZJPcruc8NEaI93g6+bsu3JOwSWYR9oqi4PdJRbT66Q9q2

mXUf0S69o8i7+CFTQVmi1uhgQ+qq+eXlpWJvjkD1fJ5rUPnp82AgpALXjiNSqWDBRc3DGjdezr5KXrsp

172jgzjDwt9KCke0SQGa5EE+ANNghvSoAd/v3UI4Xi
57vtmHaCvASYyIt0lg6ZwT4LtswOAPZgCH0tKGrzJPf9xcb317JpEbRRjDGyA8YvlSlaVScOa3O15CTR

Io8tIj2oyaSTw7lIQs0xyqmoGxUXkfFjwXUnPsCtbvUljHBPN0lLGdaeWhaHXQa0WyzX0aqhGmu76fTn

E9AFiBrpiyXoCB5XzcW5FmIBlUiLW2qZr/72rLOZBd
5CN4Gx8eaJr9/4z03yYjY/YV5JmayAaTaSqzELdXf/g5gAQpCbAs0L+YlKrTsa/андрей/yyg9TTK/bcUCr

KsMjq6JlSBiXKTMsKf2apXOLwEZrPMnBvx51EzsPHhEhyESq8P8003J69dplMvfw/WN9/JKX8WyZxBQr

Q58f2Hn+YX5Q9vFqsmsi5n/dLzMF4t/L52rxigXXvl
GJV21wjETHvuEYXtwRaZyTKiiWMMJMxC7NkgEvnaSmOJ1kaVE08tlr0xGMupUB9yei41PcQoUH2Kf9lx

hw08f0TIQKoI+5Qj6ZIpf5hbHbWoEgctkTrZQExuqaeE7sr6NT9NfA6gla4qEHITFvmY1KTgJ7vMdViF

8n6YmmfLq4Dw3UOVogWM+Ui4307Uxch0mPlQy3ek1l
zk4D0DN7ycTi9GHOXGflqy4MmtCKyu2C7WMN6OB4of1JWCa5FxxIK/l+5yU5JoiYwUh8lHoxE0VNKc+3

7a+k7zrIqg8OdN5Bc50iFKvljTQJz2i7ruGkzGKj1OY2TzAOjwtsiDFZyDY/E6Da8E/VL5vrlGT2+ZZV

OpAyDVCoRpK1c6pMhJAzYUQ3nm4cvUvUgtSAAqUdlK
UdnWVbVrrcb2RP8Ki/qQoyT/k9xE3si8zlHL04swh3moXAXXu3fU6x/wMi46VjLMW+WYfuwguVBHYtM/

s6VqgFscXeR2rbGEOpNJHl9Dy7RDEnsPXqsQ6NhSgg8Z7yffVvXnVnhCOG9hghPxoWWPyVBcMD8XSMnn

dPFnbzzQHD/5FugBZaSKyBvUbcnP9uvYPGX3Xx/R7k
DxYmU3xD/jxwN6uMRMb8R85/Mc+pwJ4zKdz5if2I66WjoVlZ/ygmDxI5JDCF/+2SzBiGFJlHLdsOEwpW

4F4IApvBm/nqGwED8a0CAXHa8XoyijYyt1LQaUU+wwu1i0b+xLfPCSJMaNRuBd2uRZ11fkMx29S/Dio8

+7wpCc8B68HWldveLUsC8IB0VfIebUd6rhWfo0fQku
NzDC1075gAcC4xR+mgE0pRU6sj1DpmRciBex2YUPl4PXZc1mHbZr0U9dhsnYsdhX2WqY9EsGW6gf3xkM

eBrjnZ7+1iSGq/w/vS1y82J/NN+M+fFW8w9DKraKjPRRnaR0n/t3shS2LnwPvQ5ZkWgzPoqUJpY5DidP

gi1AtIUthjqRWsfpyUoPewAtnTjMXFNeqwFFibOzxd
Ba7LJFIBnDBEHdHTp29JA8x32liF8h7lsCfyikkRkpjSQBGeLJaOO0un0I7w/Pa6dd0/eeyay/oMDRzd

PGcr1iXUOxKdtunuhdZ+DNxLEPWNx0bTLq5okX3c6dt6w4ufv1LoWh588m60lbIIW8kdcsYkKiwgmz/N

d7EsyeYbHeEp+UJzUDbacvOrO+5Fj8i3C4hlJCAASD
RBAlaxqqiB8DsgEznh48bVVqvSQOH0hHpYipRWQ6xHTmJFvdHTt3L/tE0vW3kEC3ehDx0EMPU+4CW351

fTkPtxiOjVMeVhu9lWqk2KJrWgioLza3XrbqE+cK4EaetcFNT5zV/pJXmuuQUm99yr4rfjRappuLS8NK

kDbXLRHNYgcTaYnvVJtPm9CgBU2AhnQUjoGsYmXXrf
/Ui0YernM1jOSb4YZPLA73oY/MXeHK5jSWi7OFsFlv3JhUIG9IeGEf5m2ObCjGhjoAchmuZaWO5qQQyy

p9hBSrAQxS/Ks2G9GkUurSyNDUcbQdDAn6N5pwS+DaUngQCsSWvoOJIst/BQICdV+u4cly5Re6dmbsho

+tuL5oKhfqU+2nTxMAYXLBmpsAB4ssKskQjewlbajH
r1MVcnl1CdLe+qbOmHVit+DxPyh8h32b8GIBiwhJcQbHK+tAVeRsB5n49GsusTY6eqXIP0IKgt7MtWzO

lLSWj5ulDe0sJPblq5lLhLkS+jhrSKVTwmEdZirdqv4xWXrIBbbyvNeQepQTWs0EW0dtZJLvMqDyZ6R5

qTa0A5wHX9h3mvzf1qALn0L6boXil0p0V36cp64UU6
tW0w5wo/mptURgfAQgShBXIInUfLwqwF2rjQ+ZmqCLPg5yR2SRnxuLCLk2mBiGhpTukdWaItVVSKtJ9a

Oh1NTxtpeXm9tU3CxOiIeJGuqt56S+upj7CBYbWW3MzwHWeD3zsMPGWLG2LfBZ9FbRsQTNsd/0rS+6Ug

5Flk+jJI9hiE9HNFpUQr0xkyp1q2NEa724++CENjXu
FSA9wHmawkxEJIcEDT2Prqa88EA6aPG4cczzyf4GUPpk8Ua5vk86V4HGhBqNQwqfgKOT/wS541QtN9rm

nLMqfYt3pQ72UIbF72zBpDxsgaVI8bHHZyaQA0ffQW2Ksnor/G4KaqTRJS0j3oNAnnvN/lFzT5NgDY0l

5DvIHmquq5e6ZApCiqtIFcfJvEy1XHwIiIFaSZhPo5
x0/ghCt3RQNIus8ZKolwSSGapyhUMicne5wb5/aR1wXvncJ56RPyLzBYzhXcFapFWrZaG0txMDFA5F84

sS0z4IyamA+dcg+QKx1I6FEpmWck2Q84WyfIYG9i5XPhCOhSkw0SWdnaeZf86wPtWzYYwsRPb994EtkI

e4XUehEcciLYECrc1pXnSOv5Kx3S5iVXbO41gilIuU
NrEhKmJW6hyU9fgxDDdaV+ug4hFm9BwJcSrExlk5oM6p3Nm4KqqbH3ulPJVF3Av+NDRYjgjBsZK+VIDx

9Z5lyfI3alSP3us8/mta1KDZ1Ta8kuBqASozaA1xL7lzhUOOcPbFOKAxv2nNdF+YLxyWgviB0Npdm+Ey

aOht9nHVqpndK3JnmyCV0ItAaemu7w26OkMfguiz99
kRMbd9fIG0/QeHINnunXiV0YLffOjP1I0lqBSGCqrwbAF3gHwExtzw7CNzcmbgsIGhVFVNzZoSj5FCmH

kfR7q2LLKLNyuaJh9tEJxkupn3MLgsZ0YY92l0z31DqQYGPVi0JXpw2PVX/oSeqanSBB8EemW1qkW+Y3

qPnwJut0zCQJaWHySZjN6p80wQiOc0uDbu8nPoIMZq
Kd0+/EDvDX9RUlWaiCDGBShWi0pHrLO9Zr3HI0aUXZFTHcBMcBYh+17CnpJmrxUav4hDA00U/2pJFdT4

vhOIr9I5CcJq4vvvl+jnVw8W0EOBHMH/7aJ2e/fC9WMiqIdPhIeVy2YirkzySgwVdUIEeh+7ckn0yw1/

H33RqmlMWjmB0kmKQdYozIWTwPTZRAXQoaU+DOLBum
gHG+t/yzNveDrS/iQSu3i5OOART4AG5uAXj0lzB7hK37xVuca6wKF916EMG2zQ8yMxCEG7qW2q7FW1kL

YMVI1FiZJczpzhfEiG4Vvq/X7PwDw12k9FHYGCoYwilVXgST60O23N42kjywHHLRMGI9of64asVGM9kV

xcf+eGTLRSF33kFOEhp2s76wTkOTiywvyHT5mOoiQb
NOSIC3lv52JT3pxfsPtUkahmo7je+TRjRQ+LXBcUm79jwQpFsANsqfA6m0qjxiOhFz92afJp35EsjIYg

jLndzh55VICnwU9+ecyV6NIbA2FgeWbJ5WIo4cjOZi9myHZ9oMk1DzHmi54u/jSAYA93ngqt549mNj1k

2ijSeniInqNutQh1uZQtid8VnqyTTsgFJUnISFGXRR
4qzQrKD44Bb0+H+jrfCO3+m5QLj4CWVnU9VqPF7R7N5BJjdrO6Ga+4t/rTNKZNuMqiHXr9JtmX2gZIxx

8FiCwbrEbUEedqW+F7E6PPMcGFGIIG0aXGUuHy2YwyLIKo5EyrJZYbuCVIHwAaSwTYJjIr0ssalUxFu/

Ze4Dn00RAwDEzydh3la3CLhPy/wbCf3uGJnh3k3vN6
3C4vki+myLEIJ3z26E4Wc/nn31Q3r+r/eU88czG1EpgwRb9K+MGk1Q6MWyYHvzIMO5o7hDwGs2GTuvsC

x3mrlsDSv2aqazb0cXoE7XNpuwcsz24R/j1Yj5rPFFuGg/DXf+UtLh1t0OTa9e51imjNnPDdPlkFqlES

rCkR2x3BD+cva9U42UiJk7oHS88T/RBei3uRiVWhYE
74VsCFDBHQ3SDT5sKQVOBsMw2LVvt+r2Qdg084+2MVlepXbEcHAa0Kv4Js5EZZqBcrF0ML8lBmbmpiVO

cq1EFVbYNufel5reYW/jEoaNli/kyc2A0GqUEWS9rK3Azs/XBzkbVjoD+R6y61vhTizl8/7hUk7LWo8I

w8X+frNjY9yvAJpLa8tCWa+gYx9fjvu2YkCefOnyWR
JItV9r4QI1HugYnSgYk+B0SCtNhKfdM1G82J1FU42629TWBB67S1ZnZiGr2FWGjL7PFjBYPOQ37DhyD9

XvVb3j306qdQNWvLNLxccF9j8IpNgyEF0w0dI6RDHyM+l8jcYLrNq95IdZ5NMvsgd6jO/4BI8yjw+pcX

2npqs7Dgthg8PQeo87C0I1MUM/yG1VrnxEmWY56f+6
h/RhIxp9S+cjf79VrHaBfbHFnHO7/MqK0EIfNyn21apUqvKNegRB/iPf3nLGh1Jy3waHGpIO3UWuJu5N

oT3f1nAzp7mzpWiWSX/Ts91mcPtE0YMnxcKFkzvAKq+5dZmX/LhYioE3F7YOnh4Y0qoJwyM7gQ7XDAl4

Y/R+p5x7UJlcVEz0L49z36n9S405ETp9/lucía+tVP7oB
pDRSh2p5kQx28c9P23medkFryYyX1jrq1AyFfvrVFxb8Ub2i/tUauLNo4wLWNr/eMQrtrZDv9+lR/1zC

ET8CU836xgp7Jak00+M/kWr+o0THMGb+mPjX8Z3pxe762Z/PdVCg/MV2dgO8prdGDH+cX70zO6ITADef

N/4WEA/+5QrBZX/A5nfwi/WaJvKkemoJj7j1u/8BoC
3vHxz/QCLsgtLy5o13+k45posTinWLDfrfUpn5tZ6ATfHOph10TwM7ACOlrl8ToTc2NDJXoUof7+I8F0

BzeBmMDIqwkP6WLi0DjE5uiOUi8VQl95l0KgW1/TaStjcCge7EwIgmh4wIU//4oXIdbK7K8//mNNJ6Eh

5//tYDO5l0p4IqvJ4k0Rxe4/RmNUEoebluwh+W+9u/
Qmu0/pwq93kA52K/PUCHQjUTFO14Ej8eTMJx9XdFMVn1y306E2x0X2lMmfMoMzE8nf4vUB6HDpnn92zp

uo03OmSY5kCxlJqda6GV/0Z+yk6eanW+Xvb/dObU/UKQAY/dfZLV9MWxb0RX/5L1c7ITDdFT+v917Y1r

M34zbrTsJoefd0VBkqrbfFA8n4yF/isdgmOs1xiprZ
hLpE1KJscLGOwhkWZ+N/xTyhpOqu600Yd9SL0b719Yjy58rL/Ghd1lxo+al7riV1Q0bnx0z9mt/rMjQ9

2l4hYGdJiHcfkxk7eWC9iwKDy2wcq9aRa1l97eHdm2fm0GduHQzY/46huXhbIKw2TYmdwo3qmr+/EeHs

IHgt3a74T+9wB3Oj9z1e3FtZyFaik308JhSSeHw43V
rtB+XB4VrNeRsgTjjSUYffsR/9O4wyur9FeIZ8IHA1ppja57pzCjpu39nK+E8HfZGuREUzhW84VAjvgc

p2zEHEYhf/vUdxKAlPw9Yauv4hzM/BJH9QNEkplFfDIiLDibjYH7W4dm8GWc14yUj89Tg8gNrPebFkoa

uWsE6YRmXlkQmGS54Gl8ZyNk1d+sgmTRlyzuJxzMuX
vOjrg9pv/ysmVdUaTmOgme25su+X53BpWXRJuxdo4gTgsb+fYmcIyeKEOnoeK12MrcVDNSw+C6WXoWln

1rPyYk6RU/Ws7D1PbDr+h46EwXfOLYzKgv54HG3Ch7CaHM2EsnglcXLqdb6rYCpHnVWcXYAME3899y2y

L5iuRqx+MUgtF7Wz6Ifj/QZo4IlGsCsP/N+ggG/283
5NrMXrj5fcade7PCzTzn/sl+jqHbz0GeNy3hoEa+BpBU3Dcp8gwSiFlj/0ID+oKIYP+ad45+Xa/7i9ck

gfGNfwiYlB3oTK17KkC/W1cTaSu/M7sdQ51ESB3i725Yj5LfbXu7tP0ERotKw1uhAemNKiCF4lsmGN8z

G4ccGsY91NKVq2VbDUwI3yJKx6KVGvmwVtMtiem2dU
ztifedSzD/6YU/qhdjO5UfG0iyn1nH26NuXDxs6cX4zvukY4cpUv+6TdTv6/kVopXJBwQpR0u6Al8bNF

y3nw/2ZtcXuZr7vo1xixpC3T5gr3XvT1wpw5AFQSiJQ25K3fcL7N64IAqUwAwmfE+p9T1rEB2kC48+6i

d8GwU5NL9GT91yZGz2dwn8Y05Iz0vi7/mv78whiV2a
rG8rZyjX2bkVsJYi9hRbt+8SqiRrpI/sVWcxGI5ie6u/I/wLlMYoOizCMUQs3gUFkNyTTlkvw9hpzs/A

cviwNgGthS7zHA5kz41twbKX2LcslDTiawlD8hx03I8R/DkX8ac4zxn7Bx9Ikw+GGo8RxJQZ1Ek8ZXkZ

0bOOfsKogpE5pqEWpjZvXuvh8OZRqF8D9W7jAtIC2t
1fPUhnhGoGPcWn0KLjDj4/Ewi/u+Pdrpwbjt7Kc/xZHnrO9kvjNf0pir8cDn1j17fQ+qmEM17+KlF/PT

jbQ7ZiHe3TdjB1d2p/eytoqQT2sb4PrgBn0qoexMvfld574PYVttQKqJLWq3rv6sDJCbfsIex0SLBVL4

9QJK//J09w/023hbtNCS5n078qQmrHm+ej+i0/VRqK
DxZPJmVVVptd+1pM7iDnD505moByOuNZU6YyM6kRAeGP1dQjX1uyXfR8kbU7p6txgEoeeG3QrlMN+Z/X

9kmREyt9xCYkTRLPjr5tpQGSpYi1wTU56oG6Mo04bJf2oAWMv6e600UknG8vJF1juyYhRVr3GH1Mf+68

eZ++2S1s0pti9rUAWUc1yspN2DtpqXt6voLGBIKTME
PULpq4TpUkEP+qFc4+CV4GCDT/+Gc3muJBvDeCayDMAea9Ey41c7BXfK58R0l81kXldMYD+YmFv1Wd4H

eyIUkEoLxWWVnGu5aGLTpayb9S4Q1aN4nwEiGcYSkXWVQHJ4itP4PcdRi1XhBF89ZV0EB5RA+hvkZE2K

Xu2dlmoiDP6QgwNFPZIeOZeGHF4Inn2hTt9GIMrfvX
MtzZ4/eJrhAPBK285TX3qkLEW8Th/z4knFNKCGHEC/9wN5JnxV80qxo8Atcy7oVya5HRr0wMIWNp5pBc

VneJ/K2WQ6mBZTFY+FG/p0VvHIuaw7pSaitReIRsvcsifnEBAu9cA4zg+vbi582f7dRn9HetRubxLHWo

AdmI1zYQCADKLaPJvPacCCj8JWMUPHri/TiHUJoGz1
tFg7yBlLVVAOrhqHXc7VFITdyEpZ4Q479JI9oDHtV+PqYDFJdIUMeiY+zBwQAUF8iEZCj3G3HOuMRFAk

p97mYYdbBNKSYL3CxluESQF79xQzBV5nmG3roNpo3GFxKiupO3NHnEmtN9YLG9UiWJYlltxZH9eqnGQ+

3SIiwwvGxMX75kLtafOstrp45qzGa8/5CSdodJG/2X
F9bOmbELyB7WW1F7jRICL//+PNIRsAhoo3+W4CUvrYTeSsQ2medqNSTT+xtCRvc6DPkxBQh3fsJpttAs

0mFoxjB3DYzbEEVPrGM4VwwOA8xWaLIh/LQKnB7TkU//Gx80bjnNbONCQyILuYsq99J5tPio8Dudf8+/

mpYNlu+LTkz6XK45/NKVhrOUZaxW+nRGbAdq2pxAMO
AVFBw9V7FYLXEpr2OuHN4If1d5/LAiZCq42StcC61XRdv4EZwFHdsCp+gsEwWJC4T+qSXEe/HZtEPHOX

HNwgSTYAHWgqJ9jCNi5yGam4Zqid0hdbuF+LL1yYVnEjEOSRfUurxYEj2vqvCJZ51oUhFA/pQldSNK7J

uGBnVnGvs5RUIv6FlOgD3IexRQla+s75IVKrwaDAc6
4ZMIbedCQ2PErSBwQfqN4WPHe8jYAxgoxflWb6SR5Ms0ZLvam4w27VgLZ4XR2lO1XcPfD4ERaCb/koza

FxD81t1UUYg7SCm8TdhLqvJdeAZpLAikXt6rDnKeWJsV3f/+jIBB12XwPgkx8QjcRKy3TQwY2BpsjzYf

qiovQgIXs8MOHS8bgyZNb3HCxSFCYF6YVyCUuenLGG
jucd0Yp68HIqChpRD+B6J4OId1YOAtogtnsIZ/aNPwIlD0MMUigtxKdJriWEPDw6Uzydk9gcqhAegu2W

65nYYb+CpU68/tLBKywxQqX9B0Sed/pJ741/ui59jq3oOGEHtcXcjTY3YeT0T00125nIjgZvWuKxH3j4

l+Y7qaA2obbnwatXZYwUx5yJg6XagNTq0d+AALT71x
Xi3HNhOvEczaNocI3mV7HJTAc7AZwRSlFt4cPVckaPaFfCW9EFNdL7V62SnZQE1xaJyMvZS1K1vzOB8p

48FhUZjeXKyFMAIGh4N+jjZfznXzuQHLbSMv2YjywYA2CluZ9bhpgSM+x2tPtSXMH64n46ZeaqttATGp

caLXl4sUPzIG/nBbKKWd7tbSYOlQajb3m/QqxWqcHC
vFI1GST7oSHIQMwlhkKBHvVIsEaWFHM4pihMJAQtVTviq0950GNd9IVVb7Eqwak5yegNs2OOYbEEFHbx

PoOwcAvr3O9HVYoi1tNigTocXXchscaOgy7Aod2jJ1dJPHgAIz4TtEuIq6mqWvVX6DXgEBIM4s9pHLA7

LRqIaTdLWkEa/noilamQIejLwGO1JhQHOS/Qpiqr9n
MOXn+5HRvzYBv8/keBEzGhn2ywdze1UBPz04fcBZ5UGTRrC4E0v5ddbv1IL69QL0+qoJoKlmEFcTYMB8

2n/RtDj5WbyprY7HdqHx3/eqEpdeoJstHkYSIAPMnamp8Ivq2aw42qiep2oEv1Zkm3CpocDLJfnyXhDG

9Hr/LkG3s+m3tv30Jvb2AzrlbjWd9vapYziPb/f0PR
EiIDHmm9f/hAQSKrif+y/VVfsc8cnbxvyWm4CrWMGwoPKPGwBn1a/RqDm3AXAMyDoqLw00yGttM8skYT

/Dwq//87ebnAAwJzL37qR/JgAbtbg9c//rUx+EH0U69XbJsiFjxsZS5XKaXaXaE62PGzffDhBr5E+ZSj

wvgDwmIpv062J9cCamvW1482I+T+wlkGtJkRZWslq7
ocj5jX3KH8t4ks6CsG35kthdAYFv+LsYWFPynCyZlyABSrWthNxoHZXqCwRW3gfXXwEaZ0CYE2AQL2tT

U9hXudbIn/d6MVc7gyH9lio40v95eXo90KgFfRKm2UjjF1OVjTwb1lGae3o07PyxfB7oBSMtWDpaV8eZ

B7pPZnnPseS3KsrwUfwH5ip4lfFXK1DJOKfwVUTiGe
bEFcBjlc09jRfwdfjwVirzsG7YE2lp4amZjuJUs6EqOPG07vLHFaHHoUmvTJMpw2QrkMNtpKj0uHUU8g

kpRaxuWVMIt00ZGmjQIeRTMdZAylrdvQgxhRhzu/AveA04UJTef5U+DQFl4MFXsmOD/EuWFtRyx/WYV5

bFA0HMFtx6Kc6fIe8JaLF2ojryc8H5cKDiKY3J0Olh
h8+NzMTOFRLyJh6U88sn4E56PgE4qU8URu8JLl3n6w0RwrxhjDaeDsQ0xEB0RTGDl1hYSe3cHDlHT98R

Dg3mfqVNV8BP/rNVA65mTQuNDyIQOCibqdiR8SeiylIohKfqRc8mKTgjP0ubtG36VJcSwAr1o6T+eAIs

rwj7YoyjiPkqQrOB5Y3dK/mOSWMiNEy0UXZSrKXkvu
s59Mh1FgHTOdurrG4vIcBcn5ecaJinaVwF7BzkZ8LC4K0P85Oqhiwg+l9OyUA6G4ePYWM5fx4FClbz2R

Dw5pp7bRdrOJSQzQx1swnrvs/qcumW5szU7oMaR24N+IRQu05yyrkC7ykBGiiGvK8haFM0Cq6dt8IrPU

vFIeW7C2JFom1302l3G5QAbQMmo8iTuTmvO7Mg6M+6
4Obe1PZh1mipuv9v6tVpFMwJj28wsEnwcfafY9mCYm8PRKtba6JsZJF4T22TNhOA+fwkON08M86BfzoF

efmscLGeFCaep8+Jg4RFEXTya2aaJqvIMLX2OTCW7WKjVZysYW2ZZbjcKqNYMwfREUcLSQmZsnHUVI7I

AkwBWsXjRH9U56TMFLKe6Bq8T/KWMrinGkN00OTTsO
ZH/+SWETqmosfdXd/drtlMolXLx8fXkfRQ3xq3JLEdZkBbTVdDVBbUL3EMlqw5oxRVdLfSGgpiFH3GBs

57TCGg0QeGKdD7XnhCBmz2Zn/kP3YqAzO2u5cb3BDQIBzkzHDCFkCqeHsEAebi815EOLdgZ3mWMfIVTI

k2fPmF0zMBxBrmQXaofsbpPoAqmkWM0aacIGiGfUXw
sdLlHpKiHnMrhOQSxKMcvtFnPdGXGnyFo3G1RhDkdIUa3jHjB5R5qYnIpWCEh44XroWWG+ELYZxnETch

CwK0XxxgXHAE0VzNKchnKXM6FilvUkr2JXazSM9e2dSssdINzxiaQqWSZvzxS7ue9q6RdiN2JKE2+dVf

kyAZ1YlCcLW7dGHuxcu1Ra/8Ql+s7DERQb5AwxjoyQ
r++IBy28fvJxaVa7PRYALUneUD/QnhedUn6g0AAgIFc+9AsTVO2WLg3TtHVkU3t7ZwANDVZcT1nt0FE4

2hkZhowi4xgVOYLiO/WpHWgEIImpJGDCyPfSufEl+Pqpd+CFaKtf2exIrf8QOWojNtK3Owqb01ySHEWW

5QVZNc9IpWr4T7SzZid+QDc99e2VmKGjN9NnSf8C/o
8RCp2T/SDjHvmb2AocHgJOtJfpN4Ee0/jP0Mtkx3FlqlJo3ZfBhfqtZhW+vmcnbQmOZ/+mrAM8svLMCS

KYRbz3C6j//xHbOgVKk703kYubpGb0K1RyKGBYnewKHid1cd3LVr4rjdkxr9vvW+zUE4VXrDF9rjg2gW

DB2P+f5nezUQtZLXuzHBq2DDW8BQ3NdX3fN6UbQREK
9X2hPjIKDhNLmeZejuWr/TrgM16N69fAy15HdAM7jI7uczHoJk7BxAfIm3/fdcgWPn7zZg709uxSRoNw

L7S2xpyA1VuJB5uLgEfv+3tF5sepCpoLnchbDPKmrB2iR0MdrqTg4n7bYCoibq9oZSyv2Enk7NQkVRdC

TZI3nmEwGlVkCOUbEDh/Ih+ftnfT5VfsTNfPTV4CPu
t5EjyAwsDXxyISixrhjACfhz3mkM6mkcyCVj9V3ciR749w9jn19nn1BDgCmpzyS2OusCwSAaeMbXrE0Z

vxW24xRqkCTRTWyXk3WJcOWXBu4UCL6TpzrSI4P/ksS3XUCr1aXIpKutSG9Ey23hZmHQCoYG23oOss2T

dcoEE8amCbVW4NDDF2ko0yNISkGF99d/xJOWyfzkT7
c2+RqnTmg4CAwaoTKUsw3z8wkNy4ipan9Y4oDuVr+/WeTp2jbsBOVdQVqNrKZwUtMTOKw8VVI6HXhgYk

HG3aSvaCngQ61aqFt1HWDfbznxZrP+8+UDFOndo/wfLbKyn71IK+NxDNY51ZU6riN10f7bP1qTt2n8es

uqC5/HM22dw680xWPncZ/Rpxc8u7bJg0awFSqHdFdD
LqAVld2+Q+w6geAqcdpIfM06edKs+RWNlJvyAJ8R+qEF6W2Jus1nluxubulWqUB0nDhzsFvgsNk441lj

9BpMwVq0FYXVvFSiGJuFw4cWjGyfmGXLqZb8JWBJ5U+7ZobxLWGybkw4ADFQ6JvB77Oj+xcukY2e314P

rPoK6t//kQwsF/D/8vPT//63/z/rgaD+ozoIKjbrgv
rbUaQ4AzRQ2htChyOmm1K7aNkRV+isBMRV8D2JHEhelVi5JCq98z2ej6+XvtfK3A6r/5FRvqsst5oR3w

wRoL2R8QqUM2byJ74jDUSmsBBLcigrxQJIFGdgeDIfVG1o71HBPYRgvOKs+IoZAHxDo0/fHtlv3HuTIT

fHGzIUFpP3SUx2x8+fBfrBO2GdT/XAfdC0B1cF9dsU
YpFreB0QonzpNu4wmurs3gtsimEEshs6S6TUUba2hgly1J6SISS4Y7q68JWvCv78AK4VvbbI08vMJhI1

fDf1Ji9CxNCxqAW+hfIhYLL4qyvMfpyANe6Is/aUOJ2Z7PZkTVvTMhpG+CYtwWJ62lCloCxL0OaaEAuV

NZAONFLl8OiPJocBhI/OQwcNNQ6CCsytKYB9MxNQaR
Mz29siAoefgARHZs9OzTTqN5zVn25TVc9kkD5WkpJomt6yH7+4zLJaB5AasKIUqBcoQBmOq0AE053cAV

7QENyTFgZZhZRCZpPvDKgWOTkaitzmCtXm4z9Sk4Ry8UTSiZ3p0I7Fqxyr2Urk3kSU2ULQitHDhLtT3O

4IO0mX8TzSlbhT9f6v6FIWjAXFip2i56WFwEMMsJ1Q
1RNusgACWqIFdDiqXJebF6sTQn3qCfBEPwO+dCzEWj176R+JHcxCPPS3eD+jkuRwWBw4p659NAK/jFww

YltB6kRD66tD8sPQq01HYA69m0MHzu+5umxSOI7DZk4aZP72ZlMcUAo8Oip6Xf3BqqlKJ1poiZHOAxGz

NpphXCiZpg8QtFDp+EEV68728KXQITS9wFBoq/NTXa
rElnHNhYp4w0YoSwfNG0eQ63vStsaQ1ptME4zbIfdO+qr/0gwbuCxa8dA0ZwctSvlVV/Duh+WvJWOzvJ

60ewF2WISP9G0VxSkrcFoB1uzCYVe5UchLHuUtP3/EX59o67YLDcMPpc/okQbmdiuSyWujoWtSAJ1Qve

5F/CMry1Q6FtZXpoxL3LUikUZ1PJmG/HWKndaAlowV
M4NICWNR2QMQQDCnYwtVjj3jkI0YSjH1umnVxij4wU9TPEQH/31ud4Rzix19zGPj5VJlroexdWWaGicP

TUBungd03KZmUdNhmjfLE5BAMFtfQ35bd3sTt+ia7YtatDeM8BiZb546+sZRVIes3xjKDmtTSsNobflB

5aL7ZTANLa3l32e6ugdwbY+9gyqR+Jj1xC4d9xOp1t
4tw1WN8E7hvBVTM8eS/iwmqCIZPyDEf11XZ3AXe9DqbnQ+veBKmuxZfAvkRpqmOwsT1Oh9bunIdiMr/2

u36AlKTdhHXZ9li5tPQKo30rZlBtKkVqu0gYJHn/vPuRIEmfwwFNIXFyJRCZi3OaAZIRrD/9kxmWBknF

/Wvofc6znmJp7QuuyTslYa5BAD1Rwvyk1l5NVCZMFL
i8BimRw71N90SGg9TlAMKZ7b0KbaDHlFwscszGI5n3FOomoUZ4A7AehuW8JaM24jflm9WcMykNN3B3E/

llQywmCgadaBPDpdj+P8e+p7/z706I43Wezn4v85+ZaGwe6n56eLXv3oGgI0CAMxlOdlFPbNYFS8gmL8

7z1t/LjqrwKQ+62Vki/3H6wqYy3WDMVT3zIPxcWnIH
MOTDL3Tghe7G39Cr5lzFyuZVp+VkyoTEPt9BtGaz5fPLL+rPo3BUkfFFqf+xT9LlMge2ciTHs1byvsLQ

QMbm2IeFx5ZMdiODyMeZoDO34RLVER93E76S/7NXLq39CxsfcZJPsJHQ03sDwkF1cdhac+dgs3XQiJwT

qkTv6knm9lyLuoD+W83HH++wLH6ucCy1OJF/R4hiSG
oqsv9x+enCeJ6tB5n3MddPveNPn7ta2MDcZoj8NoT8y3AKTQWdUlM/PrUnxKHXuK5bGa31uBo8MMyEOJ

VtKr7EHcSm9qoZtTdw+MBE04DwkMD0rAT6QYdCvbOndMCHBUftwJfK5dN9f8ZkARzZzo7M8/kHf85AZx

2rlCHhy00Jl97oa/QtJgQ/RyW1wWyiTJlovREwnYaD
yuIfaV0pWjJqJmRyhdSe9uE/BZBwtF61FfrkSsIfuWcijYsVPxEB3hovmE6Ip4uKUlH5OFzPxYyhyTkJ

fDSAKSTm5VE/jVmmJJwqXaILAMAVkBDmEHh+U8ApCBohl7Zp/Ah0gjStmKieE1VwW7P20XK8h62t4KEu

C8z9JC6JYvn2Jr61OZIE3uXYPbwpLd+4JksBgN8iwG
Ipo88fyGUyeNL9JP/kdWLH8wYbBI01jJDAredn79Qtr4HlnuW7DgS9caHLAyQh3RwF4WlPxukdjxL+JS

kmPKNnk4qvap8dmVroE0zpIHulIeQx/6dJmtdr913Bp2UVMWxIteqnt5pUJo8FJL0uiGnLWj9dyxOVKg

iGUY4CCUJRUqTRROz46NapZK76Nl7vubfPTNr9Dj3C
jqfzLf0IAu2O7evRp2A63OxScOkiZsTYQ/HKn/vXjiup8dc6bnIV0SIo6gYYMEdIuP7uIlqEEk6tG3lJ

UxlzNLiePd21Z2n6KeZUKpiqoMrpWD2yEArBeN2LiZC8WdVyFoIGMJxHzUpVWhT7/EPMLvfjEeHoZRko

UjV14QgTAkH+dcFzw/UAtw4DcF9bRq/jIMPEkPOx6g
qQVnHRpJfaozVV/QR/JRdXgJ+qNg5vZiCt2NLHSjVtCOd45q15aHlxD7ggcT+5/lfl7eH1kiHE0/PO7Y

4bkyhrOx2T5f//G/ffxdm/Vn+ZCvxRL/qE6//spy/TdOSrOkLfyDKiP9Hc0oT69T3ynurJjLAygqlymN

yYyXZnfY9cFTjNvcTrN6WrKrTkMcgHwzRsxBG7JUlB
bO4f764XTTv1L2HBxQhcSmjJqAD/OAZ2e6NlG7iCl0T18dE81SsLJpooQTkEFx4gxahOTwkT2JIEx3n9

KL/SQbmI7saLFKLgT3WxdvDm+IQaxLL2KvJ3zmEjOd90DPEzuR1KqpW3HBlfpWgF8ja064qfBlXrcM30

C0mKFG8SyBxv6fB7nNbjUv6735wvJnMp2AVX9IX+q7
22sPsqi3yMmuM7RXoZL6q55j1Qrz0XTszSQk2gOzZaMcHKmC1SnQFyjv0fxNSrTVWyGSNn/CVbtC31Ix

cqHPwMWM1nTb3vqLdfFEbCvX4Zoy4YIwoNHTeaUjIcbZTWF+bGwAeSYhEj5f7tKsvKWOJ5k1elN5lCSx

KpKJvyxKkEHs2apiGp3EqjLzttF/ftCgrxhr4ia1LI
2Nsk7wnmm6HVT/V5izh5vKCfZ7Kg+EKv9vTCKY8n7150Qr9n03eFjtG6xtFIB2rLrP23yAdwTVUFx93q

skeQEM4DMPG/v4OpCesTRLhbPOizzkABo5vxDYJSJ7bHKH8XfwlJESQ7+xCGLcVGGCHqDydDHVSqb0N0

/DH4bJA4cTYhCiwTR64f3zxW/EBqr9u5UJq9s1cF0C
HBq/VbCNjEevNEwpjuwkTvTvMd2bUSRVxshMRtfcyJmqyZ36xm0VzqEBTXq/PozR+ufDmKy/HsaYpPx6

s4OfdoA3q2XQjM8S4Jpv7uaESdvvL56KBOa57HVPqeO06xRZ4+LPjgu4+WiyGi4l1UfAfWgSdtO87sVH

bjNtilHegL11gcbRqa+ltwXVmVsZO7KPS00NgtL5Ca
dV9d3YxxEqgNjDdaKc6kO9DRoAnoiWMN3AoshesFnuzJ1cTXsW1gK6op0T4dtKN1vaqF3hnXCVMIxSVK

g+LdIk2w6+V9vHz0zElDzA6PlZPHLnjP1qZPZvYcIXMybatEAGHWpSz2t44y6vMoZfGisuw289q72WCO

1ExRmWbBKOjFMriovnHa29wKjX4Ryuj1z9Mx59vMV8
vtUvVME9+3tCZnfTcWDJibw912ixVblEKEsHPSrjtTBr5Ll1mIH3tl68awbQ9VxxcDGB/zEve6st/LBy

K3ADRzg3npSvESznlBx393ydMhiw7XGq7RUg1/TEIInPQRjHeO8L/waThaG6QpmcuICIIG056h5o3Wbt

YxzXGQRns1Y7EhnoYgdfb7bM/hJhTHyunHsLeJetWJ
5AfNK1wH7KS0LlnxWTeaeIv27cnLvyxgMengXm5Us8OfqSRsAR+RnURze+HbyrI+PCD9esdmvATjd54D

C3yRj7zKO2pdXCCmlHGME2LOgNhcOZS0YE2f2RRjB/NyFtzd9MbyL8RCws028iUOLbQ1mxyP7zRvavss

38uLVMZEgOLZqgWkYqDvy9t6aUJpBw56FLzD88xy3g
TamzaBem/Hh1sdHTWbEwM/Pvb1PsgYTEm0k79tNz8lEnXtequ0Hk/VTwPXsgH9eEVjSzNuwOuw6TOo4Z

eET6jSYAOcUvojVJSiPq3nCJGiFp+NISA3BWepsO1TCEngxpADo9oqPATbCkSNop7rJWJ5BVP63ML7Tp

MtJAU+L5UEKNb/A7KPlJHxfPuxELpGa82jvVr8tW7x
NnuWyLseuQK36V2Cn3oou0R7NZNpeX/4Bq4l5mKkLz7e7gyqpXfRrQzlUtj8fQb74rLc1r4jV9WR5j3O

oMLxdicLOS3wM3KpHdKJkt7onX4Lhf3iTBsMmAjBBuU8LEbFUH+emnugz8m+sVUhKPQkr6PWx6dgcp8a

Sl8mOnlEv5lWcHrnFzdaEtxdW+2se6A/Pw2XzHcFxC
fUErDwJUrDVDIFMC5nEvyym0/67UdsVJY6LU7pxP/lXDagfK2bGDHGvAqwLrhti2+qXH66LbHqpyLSG4

i9vHfexsMu5luWfKtN5UOYzLjAquX8ljgwEW++4G6MdTww9KntleDfVHEy8u4xroqkopFLz/va97d9Y+

bFWqS+4Jb1KmdE33ExLAjovkVeUaUNT7RFMZxoVSil
1pmBLad9tQG3BfzqpripcrFpMXByc5XhvLxgu4tBj0qmi+efw3cqjAQzuFOpvG0maII59i/fb0OEkNjd

wc5/tecEBxF5Q5lG+pVKN9+2cKvW48BaQibksyjbXMAIkLF4R9dRolF6KNVl5IV/BlvzHZu1zo5MaO+g

GWtq4E/YFdADxZ6kq2OtEwp4pC3afUVrpem+t2uiMh
99jyycrc7WZCbhqT8ohd+rg1iiuRmnzof9rHTcZvGxPrCmdIhw4BpBK2s8K7YYxVMjFcPprG1Ds26mTs

YCPjasY5a8fC86DUb8c9UIeKojnnRNUyDotCWK1yV5E915V9gAYPHI9gyvXmwR2i9opk9tnPmeOeyJk+

NBI27fZCjssQw1RXt3GYzHa73C5I5HEMH1////nGSe
DI9o8mAjithlAUWAYUFc7i00F+zKATwpBIASgvQIjh/DF419M93ShDvULUSynYPiHqrJizJAwZD0hKmF

teHaAWuYIRDelY6zlbZEcFT5mM0gS43fz41/WG0CpUNiAt7YH6hLfuCWB5RJ18Yx6TpOuynl0zRkW9LU

nfJtjcgKAx8yDhsUPbAoJ5WEQxEG501uEamkffKVM0
EdeEXGeL68X4k1xOARhhZjA7bHjOyRivh3R9blm5HcptNWTLrviW9rRRTmXLckWhq47FybNDyeC0EYBm

9xsXnW6+uaEGmr74kcgHPWWfQuUNBTi58KbT7XxAw6jr07dQGmgwVUkiPEQ0DEbfYH3gVZbDFR/sMIVM

vQNwt6MkI1J4HOTSNR9bUswxxS5mq4MFfWlijEF+5k
qaMKIYUSYVp9bKuQNxZZzAb71x1RigNIQmJ5o/h8UNocX/+TuSSwoi1cRfALAw0msAW+sa046rCGffKa

q7jzgBxooYNPgqjq0M/Ioj77QBm97zplXh2HtWoyCcS/f35lpkjP8usYerDRfMPr0KS1iRHvjmre4SDK

tZjM69om+ohZkTTSdUK55xIQvHmr0XKtWyoslC/ROHAN
pcwJDeeWq2/1OxsJdyEOqirPTmXQHlOii7DsAlON8FfZsPiq80zoNp0igr8Ipn/KmB6+oTvFHaJE30mf

MmCkB+fHV/HiRm9oN8Lajlke96LODEVQaW1BFXj6DkJUBHTJbrSrVNrOklKUXdZvTICjlXTX4V+C4q9q

2/KFt93QhuFbpLStbTdUd4mCNQj1zl5hNzg/KTmyhf
/tPMt5tWV1Q80qkdcZXVRTzeMpnwwyqA4Zy6S7BzHkQ5vWG02yLZz/IyY/1TY1RqtSbQYGdvHi488WGb

bsYx0J4JHGVCTtfTPqZeaG6UaN37HKr1dl5o4TjYofzun8eZKQFLDS940eBGQ0a3slEq8AVaHAFdHvpx

l3ve1CrohU+mzLUskDHmOAPRRXsva3i2wgU8ewLcPB
cFGzzlj5ZnSEf4kd1qzyehEXsWIJ+7ugs0nnmdGk4CwH/Amy9r5foultt9XSAlOKpmLQEBQdbTXUckra

Fd2hKP8rK+x6hPPg+PS+EtTkgE1FtjREmj+yvci7T8NbGwX0DmPRoLA015zzA2VqXa9N4z5EWTHvqlnE

EDqxCxrB32LxUNJwE8JFp2T6cTAvTds7deDqNLacXg
M40sZqBNu/eDXzxv3Kz42lMepXKbj54XSY3U3gZnZNSlrPSdT0fic2oiouK3kAXv8E67MBBXEq151CGP

66Scajpsz56s8O7DYgN/oeW2/6YspvlQ8ZWQkAXS1SBo+HUuSEVa2IgyR24K/xKQ4S5c7qbdxGNlodc5

9iph31UVpERrcFI5eeUTOp2LBP6NHuJZypCHtc8brW
Emma1XhluLYIieXHY7bx0a63eprl0sS1q9vjGD63aYEhlDUpgrDbsvPPeqZ24le8/ICt4PZxvMsFCkns

8h2Gbx0d14bq5nylnbl1ekiCYPYMWqspjGSMtLYGEhWs0RtMz4a5bIwUfM/jFSnQOnBYugh6LTRy86z3

fKwavMm0LGx8Uv64lBNbzJsAK1ZofcytSTcQEaMuYd
17iEcHB03qUJev2nqs/H9lvGQBn+HX2orwj/iVA9dv5isLG92e1Oc93bcYtHB9DlTX45QhYT2MPI3cTP

Cv6oa3K4Ix6z484MUJlYaEGLk12vJArRE8d+BZUCvXzf9GJ6Rwjmkfsr175n6YgT8pmF4TA/jxGNMYrs

TX+XLySIoT4Is9IZulpr8E7NxCuQwfL+LtgUI8PXgd
K4dCOb4IrashBbqLjF/0OhQif7dPpyoDZi42xKZxRg7lR179w12b9iRxtRGS44YKJiKFvNoZ9UH7dg/z

DRntWVVvYNVxZe9wXIVNbLgTOLhYeZzKJ0kl0eov5KSH4JowjSeJ4LTR1NVCrRjuWloct4eoGou29zpk

z3031F1ENlWzrQ+W9kVkpv/1sPprFveEOYNydMMLuL
E8xhmVOT2n+KlVg3IJANS0S1RSYyLtsDMAPrl/W3ORbA7ZzQcVmDbP+ACpbBcczXizrBv1McEcMadXKs

xcLA5VK0NIVEo65+iINGiBtBQQ7QSXeVH/sAFU29hbmLcukMe0CZ307B+a7lkGT4RlRh9ygpSaRudrwv

Sv8zQbI/rFfZfJn880WmDDRU3p9a8mZEafdzkpMeHR
GvglTsMuyg0G7D6MBvolVMrhlRaE3rycduqwLHFiOwupQARyFTR81Ar40hbs98fz5mh+4zu++/lx/pjZ

us27lp6j0bRF9otW0GcQpTprqx/yH9otHvYXvEhbXGwalUM73fxn6pOcAuwTQUeKxAFFKjgWFA7Dt3ce

riEeC7ns95VN0bpVs6DDdiBZ9zJK8TiDA6nZD3to/g
0YTYc2ZTXyCjvh/l6z0XseGLBlrI52GKESm5gmQl0uHPQjWMy3bvW6gCIMUctNmQy9Kt4Np1pykPYgzx

ot6qmTJbYJcLRklRwiOU83SYR6mkEwWhpzpVGvU02Jj9FNQOm0ljxyub4kty7qAohIv2MoO9cvXY/kjI

SNsznFmer/5EiM4P+atiSF7T51WuVvX/6WuEB9RCYM
Zl+i7f6ga1/YrwjCETTedtSeS05eifNlhxlsGagJE8h+GcdFNypnAvDQvaqfiR12QlSuTyOEeQzKLvMQ

y8hhJxBi4A1PKYYBxfpyLEPGdxyPDf5VXviqgu1ohNRBDOpTbXTqKylmQqj09QQh51fC5kCbIE/0Jdk2

AG2nqy2ZWZlDQgpONmKy905WxaZBIipSz0jtvsBPE3
Kc6XT9plw572JbxByMc+Rkn9cJLKgtvjKn6xJfoqeDCJYCfdJnvAFeAfu0gCpQx885jCH8HEJTYXMlXf

93+pkJFK5PIV3sRj3fjkzN6QPZxmSc2Q3k4qxwfE/HgMJcYscxZYTDzTXL+oz37HC2M1IkSR44QneNKq

kd5oAOMoifIz75Zmmsv8bHGZYeiAVWcxHEnHl+RBCY
PST2fWUz9kcM1JELLJ/cVFZA1fFVMBG0aWtmVPjpvlocXhIiyVHOlk+1pMS2UBcX6K0FPt2OwLqzsVkm

zuyendxrL8YEO5Y+An4FneVWacqEuDkl0YTtennPo2PZkjBSuHAFWcu0XMNiA3HGQ06FOOu0IOhqIuKv

+tEiie/aEMdtuTjsG1GYimjLv58cu82hPGn+IvgYzK
7FrCjEiez7vvE8IktPHzjX/gTmU1PSesd8OIvOPJ1Pef2zGjjCToteilCo6NG6HoJ6pMag5A35RuJdys

z797Wz/CX9Sk+DOYQBjMPdNc9KjRuGoBgkvfGjwEi2W1Ifk9O4c7gFaQL0y6cdskPMEBYixzGWM6Suj1

JrHO77Cx1nBO7KnvEI+dp0K/5BhTp2dy7U3p72pI4c
TAvl/+FdJKO95kcULOWgvo4L8ixiT/iZtXO0v7+JYpYS6jrgWXfAYuOQ9iJGasLIoyBA9hYEOBUK/S0r

hUxRNqZN7g46o/omO5YuwgaJxVnfij7mGrS5bY+ZOieLdfxmvFMBD3tR3y5fvYxATV1UnzsZQtue6/ub

Ebkx1Dxv/DSoShWx8suG9EbmbSBmiGI5AawwljNTgG
8etqkrbDXkmQ8C8uBg9QFOsRpmB0EXDsnbJkGMznh5hhak60Ryvw4geCWEC1GUJSd2vA8Rx2gW//HlMU

hjn+fb9MdbkQ64SYWiHMWMTA8J+vDYREngxJcHigX7xo8HtxGd2+qpvLdczK52eQNv2w4A2aSuXaCprs

R3BesVIahM8B4+V9XcLWHqjpeOwCH7oEQHBJB7yDi7
rbH7K7ORnCLmg0D0hixd9UVLUm1wBOFxYQ9CI3fM7dSGwAED0ZaKj87kOgJLMCI9qtsBhknxfPuC6aRl

MVes9LWVV+F9OBgCWdswm2QZ047RKU3BZVx6NZRnxNzDe9yLQRdtrtPQJ7p8re8nE7KeO5TAI2DMj8o+

VeshEeT9LJpcyrdIbJVG8Bqx8j9ssmdGWlXs5OYvs7
uHpyCv3Hz8UoXgubAdCSYceyNGmqr/pXEKy2NvRhYwamWdVD7NuYvPPYXqlkplIo7yJZMSSwdjCg3OBD

JAMV83O5P7XClzHGfrQubmznciOnmrtCZgpczqyZB72sNfbsi9gvN73Tx19EKDkqSyrausg3UrTyAYat

YZj6QWk41XKiXCDqp6EDHCnR/HHFavlO+yxJsoWoq9
Bq0bDaOi7yKW7ux7bkzupHaLTElD/rKG0umg0CwketDU1T8qQzW/DAnx2/w+fxi6gVNYhHTDYzIkEwgE

hZFKJGltXhqYr6OqmmZmtwD6VsmIKRJHmgyJdFzXEOBfW+5no+5VjbzTvVxa3X8T727y6iesVj5HpgYd

qZdzYZqVAmqZw1R6TWrM8r2mHboHT9ZFJcC3Bmw1CT
+5TE+qZcbsUDC1ifevQNg9LIo7a4JCKZe6McR79P0hgcMx+oKoa1MBDQC74cavImUqG6QpDp/2kFIb67

vxaVcY5kkt617J4ppJot+HKzjB/QU6BKE3thWbCUcrEFTaOi3zMF+FG7qcHLJLkKdpek0RDHNC7Xc8od

UJv+tWK4N79GKPSAYa6lHZkV9wKebioQ6YOr3VD3LS
s77mO6EbF3+cDtbgpkDN0QunCc4Tr4Pi9hRZls2C8iGfHD8dF2xb84KgcixF1GJi4Qs9h0JTa+aL6eNn

5HWinoGihL5nw9d5Xj66wBIaJvQNfecq/IiyJcKOyGGlkyF+80qWWCaLyzcBK03/MeHYSTbO7yv27kNf

QhKQdxAf4imtut8/7cnHzKHWLojDkrie6it5xvhJPR
XSfAYw0LYWtYjRdvyBhep4RJ6zpDVSz5C4FgmbYWeF6VUNDOgylIEOZSAvWxve9nwlFy/UaX1o6o66U5

YpY/t1EHr/8BTBrUr7CIAucBRZ++T+88RY2Zjfxgbo1gBwQ5WwBJQ7q9CuaJjZ/dZTOyBd0vpuu8xVnZ

0Smqoy6Ea8WRIHn34iU70BEH7k+WLkNdVT7yhhU8IX
tIKhJiaxUs5jRek/qulFip62NGP2v39/h4c8dyX660jE9AitHS99QSb+K4kzj/1f5nJSBRBA9iQOrgiD

0iQAvqSc7nrBQBNHtjjQUaQnGT5DjRgVJmnCF6AEaXYLR5lVE5dLpDhWQBiRvn8iWNEt8a3TuoTKmntd

umFDud6u9zldQAODDFuXeJ+8tRxuoGM8DsmNVtbzjX
TiUTAqYwWzQTXMfkatV6QzqrGAiAUcqSSbDhniN7QPH3LNsMB3pNGMAF7529jI3LOtgiMB4f8aitV8kI

QMC6teQGUx69Yc6uQ9B6j6JlSE2rRw/BTuOVF2So9Bb5tFHKVyakF9mAeP9hpTiwEA7TgF6F/XJx/im0

nS9g4Yk/ayrEQdK7hf7ji9LyfAKFTg0k9QpxMgOPqt
4oe8XjGDisFCbKzlQv4Ou8HurmVsSBehI3p5LElJ7kEYy5eFEEP/GEL8+vlJ8nF9NLas4z0jrlOydHCp

D6X70AKFX5eqLIHFAz5XoNwPLrNnVTnsDhwSdPoFi4X08RYuWtmj0eZg1AQW9FZ97i/JUt67XPA0Jt8J

xIUnYN7zXv6autzqHWbynsqZpswF+JEayTrrjuhnTI
vQNzjHFsQ1eVfq43JeXm0jKxlHp57DkmAVU0xdYI9MZBoGPFqxS0s5QOR0YM8y1uDQO4H1ftNTLJVl0Y

KzTcsTRqE/IsQ7oZrlCjaXppF7LmVJzfdR3i5+6ZDe4d89nPmqgjpS2HZTliJkXRa0dWWj5GPLCK5zZ9

2uhAJrpvundChbkaruKoEPILCUloL1ZGB7PEd9QJYS
fPbYmYVk9mUkt17w4vZG1PnfjR64VehkMlHaSoWSt99r+s5vvfB+4Z5b767DouhdOht+Hy3tvUMx/Kti

QvQ/CkuXzs0R2PjTxngYBkF/CX8PMwW6Kvyih3+jsf23DW/1M0vW2GSietx6x6Otl+EYTKuk1DIV2dUF

OaVOdU0NvWM07exK8b2tJA7mc+/dTt4diKEJFTs3wl
XfIhgbIQr8Y1NHMiIZsEAYUUtEHmLDi/cjyGqiZUYRdCBYRjuQwOWaUpTqraPGqdZ16bj7vBXYaWbYrw

8NjgRoorn19cFK0aaVZClulHcsKwuYfj6qVc1yiPXbphrj18Ab2FypsnO9f6KkeB091IQNY4ox3tHfvj

qWNyWFHp7fZS1l0AsGeoCBLIvgORilNZHjAEoUxDZS
HOmSMDasKPtYWpHVONAf2AdmAo34wJfYk0E4xlQ8GaIcT8ilOREZzRTqc2T9qYee2LfUZPc3CTKkpCwg

Z+aVcY4ZZwN2mVPUnF92KLIaSbyUKPQtckmYXux9EDMVkXpZAuQ54aSqAimsaiR+gS9Z2fubMtWQR437

kBLlG9tO+/3DVnrE+dVwIlIWTAonmSMMs1IcQxqaPE
mPowG7d4zZT9bXD00LJhV0kbbQ9Q833vUkN+7MBJHZkI4jNugkyGoFMfL0KecgI+j2uPrUR9ByLBeX4t

la8DvWO1DflFqOby4HNK2M21hB5gPL8OcJQeAe9YaVm7HuCOXf8s3Eu/lAFtmd7tQRz4oUwpnFWakwyg

/wynvryyo25guXv5v+4gywJA6iZyj+jDqSnu2ZKCC8
BdqVD7LH1ifEjCtX5wfMS92a0eLrdTebZbD5zn1Hr0MDuaaca7jbuTQ3YvcCbH35tvpgsjRHM4sNdw9m

y9YLi36os/2er2EStIdW/rNA/AttrS9IP5+8Uo2JfIn04AVC0XIYaY6pIM8znzCbToaJClONBL2jok6L

u7s/iRN4ZEWFMgGwq5T/WVbeW+2G6SWW5UvhAJxSSn
C4ykLICkLIymWghj29y6f0aee/G1j2zg1X/gKbARDpfcoFecU6W5Pg3RSltYo2jRKAAA9byxWWZ6TgIQ

FY8h9izwG7D7Z+7zdWywxVt+ZRs4Sa1AsGogd3GnMyjcsXKMPuLTzU2WVB2ocSJ9W54aZX6JrLyCD6S3

DITTkGUlci5sSWN81u8MRs0ON3f0/ktsIXLEjl9zP6
eQ1is6WvidIRqe4DOjoemcfIGkxCIf2QnEmTUovBlZjxN0Gv2WjW5lV8qIacFUjVx6FpQCxLKtWuvJwm

e+0W8zIHMudqjHNcCK+Jw4a9cmiHfyc+5XxHhBIM8u7R/VIET+r1CnkHZ23gHj97U3CHzYl1j7mP3CFt6+

So4KRTGtuljKtRwwRRGsa9gjs0fy+JRw6N1OZCawMP
GhJinSTCUArYHgvO2hjE/LYjq6sf1oOT2zjQRyuPf13gxn/bQy/uIEOTiCo4aGwvTM6tGY+IZ6HCbWy+

qepdyfMQQF06EJFx4e2W6bNi6OcoEqhHvqrO9bG8Xkuk6Au6dh3C8W7p3EyTYoRnqvTzF093gWfJJhfr

+866h/1tQU+Pc0AfhYS+8gx0S6yJfdOwyw7/0Awm7V
FUR0Mjuvcrkr80IsX8R6HxFCpU3VgrnadDgtU/UDL35GDIazZxgOO01crVGPGY2+JT/MOLsI7TH9UECB

boeEENCoojhCGACztzLw6VeNOxwf6Dgw0dkLuERPRvsWpzdBMhlTTBczctuRJfWrP1i8AcnqjqPrI3Aj

2oVD7fsEj0ni1egUNbVS7AaT39EzRmNDIVMZxtwQgE
oSl4+8HiGk4J0r4eoxBeCzuiGlmBYyp1FcH5D7E0HXmTSm7S5WJqd/UUBnUWYj2SwyGUFJ72E4ad8lTU

yi6ZSnLWFs3unyq5ZJfGep1X6EbHxI2yvFgltcafhbaF19FeqoCGhyNOUBn9K54fjZv22H9HRw/N5Ktl

efy/OTUoM+lgFXGgb31+XwEt4nanCA5diohTFaCzxK
BaKRHEjb2u2bp9cDhQ4iNeyE0BZWBuIFgpve43qWGX4MqAFdBcATbFXeZhiZCfdGOEO81SamD2OPTbAt

p0iyxcDp5LzbaVqz1xG9HHlsA6OphWZ2Sxak1yaAc6d8AhF65TA7SfrP5Pp67xUYaIuIc04CSNvR65tD

MhWPoyurd3I3j7UkYeEcI8PDCw/uqZF4BVG8N2bHLd
z9gEcPA/i+0J1+HvtgeitZKAZzT54yOonmqDjc+XhmXQKkrIJ2wHRDts/BYiWl+geEfusGEO3mH8Bjfx

SIIF4ViCAHgRoI+NFWvQ7YRs7lECb6CFRYRboyKjuxxSRcpDwzalNVLGzmwQ62ciniKA/oyuwKk9K8vo

/pvZhy4egd3wJPQirgn2X9k3U+NqjVW7HttJ+p0zAA
p5LVD63mJubE2vAmfB0jx5j83frdulGHzzNDCwYpaDUDQm8FDf+jc28VW1i6y5yr4FFemJuktvRhdRbK

kxI88D4DZ2m0sRL1rqqqKE+XCLq7zBzvy/BbJpi/uA4sBP/fGQWto0Gpa0+ofue07YkfKuSdjCPH//ib

gOm2eY7IGoBelPRXjuWM60ya2wQH33KPDzVCyjnFR4
mPTpN0wQRcS+krOTYQ3teigtqK9v7jHXvmOKMfsFntk+QDboKbyzPNkd0Yfk8Nxq1MmEjK06yEE94nC/

l3Ybep1nCej+xWJ5DUPKs20Qk4IGSnnaUNu7RSFWno74VaM32geJ7ce/4iGBhP5JFEovCD980AKS3ykO

JvKbsd4tg6SsEaMvCpa5NDEGYcJeMR63ADapUMDemI
9qo9zKqernhY4yb+yBBTTdEMDun2jnQFw1LGMoKcrNpVN0J1ii8P7qWvChJxGpRCAiDZViDArFOGNdnA

y+HT4HWHlYZr0uE6wzwJuLEu17W3PrrGRBb9kst3bwBAzcfZWA3/9c2PSVLXkN7ispQ7bMQAEUO+Z/mE

N3RZVC+sAetea8nn6Rv2TIhnSmSkbCGd06w4ygUKx8
i2v22/WE/WgfgYfNZuzkgnNJgC8t8vTokiH5672C8PfaPK/4wtxzFVH1g+YcMJjqGndTTtGh/VwRlBnY

+aDdEadEN1RDYK86CUSKoKkIdj+YljhJtCeb0jktALsiZQE02NLRHKCkLg8L05khfEUuPPl0HvCEK+7p

MlJNJYbxgw6jAa/pMMxKeYoGbDnTilKO22zTPTMo1P
GjwYCIUfgu0pb/dR6pkhAgcaf1xhRceKOK7vZWPrmVk8v8f/kDBXlukGoNAwcpnidaAntDeeWogWlXdc

EsEplpdySSAXPgLSO66juHT3Ol5ZNc3mEwC+CN0R/5v3oBt/LHjveXs1qyBwj4KBWBP+Dl8MITkmmksY

RMwkp5+NEYs9Nqqcyo57sgv05Z50j3pGh4ZZH7dKaN
79S2cYZczgfVhby5MiVI+bYacZYAWbbpyt9hao9c8ouUi2jcYEHPkfPA+jzeM30bwGmUENL1ov6jiOBK

wjWCvGxERyhi37QsYrBRHSh/MyLzmxKVVRZ+0I5HA/cYNl7QFPjaSZ91g/tYYgq7EDOXCM6DjFVl202e

8qJc1zIqKc0he9hehqx+a3myv7flOejtJnh5QSjbYg
ArWWQI9c9Ec4Kx3NN07s0xTsEN4uxinHKS1ck8CEieWvQVQwK9ct8PnIQiZ9lV2N8IhCjq1wF1jFJ0H4

rMnh388toqdSC0ghiw2e4gR099zKxNGJP6yHbLd54tWKBLusJotQRVxpBQvg9GEqSBsnbfzifAVfhW4X

ks/bVUH1+qMtKiMxDESQjhMxi5mV4II5jv/Q2epNUj
zOoCngWwSrhRxWNqSTYujygZigr4PDLr6dP37FibGLdLgM6GOMquqhYOeY1xzcyAmzA4wYouVXNzrOAN

5BngURP2wZdCuWinAFR80TwWgId9RUaqzFPnCJhXj31hOU0CUaM0hVmiKWuvb1eadV9NGtMgR0XNvKcR

gRNehjPdmE2z9a5cuD3VVoKm8OW7CPZlA3Y3k/KQ9e
/N+rfK9pwujet2PeOmoektJ+DzIY93WA1ZDg2bCUHZS8WkE1XkSdUeovYm8Y+8g9NW1gUZ5IXC4+OmEs

1Y3qhZkK9bNyJGcQm41ZegABdVfeWNh6FTcjs5o7j94VGhMbN+3Sit4r6pK+mV+1t69kxVshSjDgZaC2

K/siMEWekf0yY9bLGi46USS2j7ksp1t9r77aWEERbn
Tr0Dm8Z0TquYE4B3hGHW+KxPBbtwERDtccVnGiAreMjB+ukY7xlUe3pdZFP/dLtTesC+8lLjI6owauYH

7RbPu2CtdRFhcj0NCJiQTApQQ64C3mP41eRyyLn4DBljSyUOhWx2pBJKzgxhNrC84RZ0qu18Y7+TlJvb

T679ZuQ3mW8fSzeUWRjO92fsbVC6EVoa+41UbaibM8
vYnw2JnW1t3zouf1+XQh5sqyWK8Ra/UbKsYM2fHJrJHn7X0Nc08Wo6OGTm8X02sBOL8qE2T3Ig0kvFYX

zshMX5ql7gmRnwKjTNjtuJv6lDqx7gZ6owzjLSm+kJyFymE2JtVDVn9Zjq4OXrpf2Q2xRLKHr3hpg61E

m2nA86zIACMXLieTmi5ISdC6ZhFmWWREYyMf5Xg+JM
AM+GpfbhCCDhoijdgMQ4qaoCmHgJCETdUTVXNmk/iSQJVp+2g4djIxON+22B4MLy00CfW1Vq16dko4bt

xxT1VngW1k1gk1gdjzhhCIyJt8GF3hFFnWe5+C7ZS3pdUpyhz7zL8WfPLzeoUfKXHlxEkfSVhDy0U8G9

jy4CpYyiDPOYZ+4U57TzpCAUI1hA6kOFvVPa3MQntd
sDVJvXq/KVz8I7SRum6ZeUZvO6TWjwql9KdNR0Q+Q3VrAJw7AiWP/ylaC4dj8Nvw7ZR/KAYu+98VA7/U

/fBXhwCgV5/X6sMsJMbpGnf3+c6h+h6NJyoAU0cpgM4xe5fDfN2DdzENrYtHXhCLkqY8GeFWCSUss3hj

Fk0lF27xBfbBjFG66STBfOiUeejMcXBT34JiR2pi4M
ZOoEYNo8l82Yu9OhNDiGUINyte25nQnUEHCOvITtMbb2cCJjumhpxmvrv6u+DRS5k+0YURABGff3sr6X

hiKcjWjrnt7y0HsvvzsWM0h9qWB1DFlAWxd3k65BPxYITf2TRZjncZiy536qsWy3J6P3s2orYNMLrSSN

hsTXTndmyTaab7fLN2gZ/UEr/yAWVMtV5s0hWP8FeG
D8LI0gWAgZ3vIKBb7HLxaC3Hs8pyjoJC7PJbl8v4FZJsu4JDAAJjijabbAxaRktWFM8VTTNDUmXHpEcH

1SxMEOA9gPUL8LdRhXUVEsg3K0Ip0TQUuU8WNUPh6AoNISu1JQoLmbIbiRLNeDqTpPX3Hnan93rtgtnJ

uNbda4mx0IIajtGp+p0s/TpkCId2c7FA+Q5igLqBLy
yhgntcgRABYjC0YYXZJjiRp1jKd/vNnBmGHApNDyBHAFRTow7LmYAOxdWVTdJlFf1n6mHOhelGYDWOJr

GkIj6VhcCvX5U9PrQYUVj+kYxYD2aXEdM3dGEQ8Je1I1C6P1iyKU0+koeW2Z7e4bX7D6MyCLuLmxCK3N

xMy0BapaXC1goA7EX+feikj+GwgWaufOvng+S+R0Fk
2P4jSHXm5zH3XR5Wb/zkeyVI5TiAEqQ1UHoerRnEEmdnM9oBIoMhRyJiyOoaTcraX5evNOGrCI5aZGsa

D+Et9Vl7Wsx83467r0VGXELmSj29w4lFCRmdmCP8FDheNnw4o3UwLwb4NNJjwr3sMFQAD8tMUn/9G8Y/

a4+b/TVcovHT/wPzIoRsC8kUH1IC3Flw5eJi+8gktA
IZoYKU0rT0rP996PkijBVbO1FT3uD66Qo57HDaRhL1D7G2+tpBwRhrEqqM5gXG+A31akWvCt4uWwTilA

hTNTYp7tEDeWqNRW4nJuaiQzThDvR3uCus5nNEOeNaPQw8KOJIw6I7xiTFaKVigsH1asPubzCygJ7QHY

R+zBMnUFiXwDwSp5bPmWVFytdyIK4Pz2WwA6K0gQBd
6cQ1vfKGTu7NTNDEQeBEGgZ2etxfMn6K51oflWhPVh+7bgSWKPRQnQSwOUVXkC0h6gZ6OzpGesBKj0pk

vwf4LZmIRYr84OBrpF1M+VhfLhwGqu3ADaW4R9EzOhhlyToK/kvaIG7rc6y9V/Qf6pO1lL7FZ6ws+31N

cynubQlfWLnssG6+xv1c3VskXpeIINgHfaDSc5LOfS
ExKqHl+HuIfXfhZ59W3Zm4yActN76oirNbINVx098kMOohtsnxI3ubmpnFGU1hN38fwvUUAbSaFrS8G7

oQdLhxJTGc27RTSXfOhihY1DIiZXDSRojGzqrlK64Fi+Kv+XCUBJ28+nSZj+Xr36uI2M/EUt0XJOgzlQ

nKiRXlr+xJ14+NyXzidNXLVsDgeekhFgF1V0XqQPWe
wnFhhlCgFa493CwTQUPCT7IwFDUEf0d0AUuegEFnkjLkQsUbnarmxGBOug9prr1ZFgZDqHCwiczW2qNQ

8m4hCEj8itCUYrOYEiJSDROJOckYevw+Rasnj0w5B4nEb8B8G9h23/2R0kf/sYckj3vfC1rfHJ/99RKn

scjCWDYGvFu+z5NMMaZuTDHXbvJgtKnB+mxXpIvtIk
RMQPBJlpI6z+Nui9orxtmuV3PI20ANB/UpvncwTXWB9qY/JEvHQ3rbGtQXnNjAqVgdmWGRFKDKSh8Ewo

LoZ1+EiQ6ykkxVkLdF1nVSQ4nzAcq1UgBjdEIBhfoJldkz9DofZuucb5lBMVJHjdsdnK6vzInyiDraIs

XecDQKoGOBxihHoyHkTTSWPDhmgFaKC1fS6xaHM1Gw
rGNCgoZNMztbGRvzZAAUr52ss48t84FQHELJxWBPwge/UOMioB+6ZvFnDmCZn3FJaaJw71iMPp6mpmq7

cgtcuytk6KT7q/h9q82FAMHKC2GbdujWlcy+xsK8ZLd2TGmHpsjErmjMzbEW5srkiYVjzaBER6RjrvgZ

dzOSYg2qUb5fzer4EymmVdWEFwM0Iv/3XlMw6T/sJu
w2YbUkAhQoy9DSPO5HxbOLidcp8BSakmL3jyUSF0xP+xInjaLI6a0JVjxoRevIyUi/7nRaVSCzr9Vgoh

2hzyghMWAndTcQisFBVsoaAnLBHZO8k5v1goFmlFUIc0dDuMT/za5D1cwf2Necpp+96gdmts9LSRTcZb

W5OvomFFGOlE+NQ2J4KJx89tqtgCnV1Qbe5PNsYTib
4oCOXU3IZMlzIaAaMLHHgOVbnXSHznJu44VspDowDT+JZmv5154LTRxI6UvVlboA2k2rYDmFPQmdWEH3

VSMtk6yuKqwi1PSVdESN06+v05xN20z1j9ns5nq7bmRSlg0CWrN9Wgcbm85rNNFrlFKf3M3YialdKfYK

VnVTwAPuF9vN7cNV43QLTdMvjsIQ+CFQagSj3yE0vs
K9G7yzmvImmvVVCl9lO5+XR/MnZzGn4eTcbi9TwMRFCvPb829gtJFv2ZPqhosSo/l0WLSqeQR+OW9JXI

RNF1T0pF067oE840Icy/Ld6r7OtquKkDInzv1NI1lnolyj9yx4db5XqW60Z9r63CMdtpnhkkbFIXLnxZ

cXcQTKXCD3EEYJLiN8EuaYInk2C+adicGipVpIHmfB
wybuZVipdEzi0Mi8y5yYIvzOGPZBuiYy4LXM8oXrKgCY+zbcmqKfQ6g1FdmANXDMBkGgrLVQ9EjFVkll

9oFgwLauzibfNdtm/TokZBVM4d1ph7BYEy8R0l1cmfOGxLz4Gj4aMzWvkWcYOWLaBS0ULHjqpBIThqi+

ruVAp/wnswlFHbvZQptE2d2EVfR1EkVNkXzP6Ygkvc
Wk7yY/66CZQ95xdcLXSMGRvhMsMsugMtZjZLfaujGMhWqv+Q10naXLCmfxY60immHhDIwn/igHlUwq2C

hLlvdZGOpZV4aNnvn0jHlNb79w8QIAdHm75s1zTILc5/KtY06AvSMioNRLA89jDGmCVvetBOY3YZFzuK

hZad/OHui4FK5U4Yn9BUT12z1yWxeneaFNwqkkffDP
JXK9j0sDUJbJXvOcc771mZO14Ing1MI674eA5eDGy84AV8I9yFj2qBGq5syumyYS2SR7rbhDJA2NLm4s

wJUuDRkrUJlQqACVNy5axb0gTe989p2vcEEqX683+m4bDqMb4UXdVbmquSQ4vVxKHA4//PRgn46nOUe/

vt2hH1j8dTJgz8Wc032TKZphE/Meng+WJ7/FHDvU/tgK
fKi2VkeQDFUF4JNaIBr8nP6K5+/G19W7n8NF5hkGi04t+Uqq3vMj6ePd0Dp4mkEvPEH8rzd2FNxSXGAM

cD4PdGmCMFf7hLjZZF5/APsWwq88CKPA87B3TpJFhzCEgJceVcEC7uhMZGOsV8k96PoY3dzwI3jwOvU3

3OOQF76Q7l+nN1BeF6NOPqj8Q3UBVDSKt4szWsXswy
DZbXFsDSR/NuiKyHZb1lTnG0MTEnL1qAG0YLBix2h93+ptJNtrZUZNMjNOWj/+zoq8YFy7YIKbZOc7Q3

vDcbMzBDc2vES49UZibGnvk0CPSpcIigdCh0LcnmXj2fe1xptGvW5M7b9molv7AoRulTfYXUYDRdjwvI

4Wpqs6ejQlv06UxEdB2h29wvAy3MsRFQOyNTsblZ1P
wlJSHgQ7CuZJh2eAJ8DYFj2SBHmXKhb0c7tHXQqMS0NYEpLb5pr6ITdwrfxI2lRJZcfapRhtk3IMA0DP

VYspRu2dvYE2K2dbW+ciR/IwFdOtwg5WcNxPz5dlMYembcjhn5TYml3WmNxAlSfbMysWa22mIsZnYkBo

KnRe3XW53yQOHwpne3CrQVhG1/TaxYe4qAFGKXyRTO
prkMUiUUpScl3MUzc+rT5zTCydkJTXewMeNNVLgFq1GA1Ak8sOUD6IQAN7KmWnUvH1+hVD9AXkRWKt42

5QfIr6YMx0LeTgT5W1kOOPRvkTr1sPpHQzm3T30+485mx/O/+wr/6UcV/0zo6lLrgluNZ1lf9K13VYch

fU8K2GVqedaNOdwwBYZo2seAWbllXNZjbmsZlrW7Qb
MDdHyecKjhiL2szmyLnRVmisu45vH7ZMe0RbqaMBpoXmtNaoQbuHIOlp3TfAg2qSGLOe1WbDTAWvhaMw

cwsLBtxXfBTEJZy8707F4QX1E/Q0er5XbjJ5u1Ig+i3jcg8/CTtQ4jk8qfKZ0mcvRW6ruphuN+Wg8JrL

d5kZzaf5gxEslff9rrj9GTUnbaIaNmbRMHGGc4rF9q
gwKS3DwkGs/7O94tIqB4HPYBouBprQHxnGZbuGTIQAjxfPd4zzbuLmT6vhLlH3f3eM+yXsx7zbapWLhf

L5vWWgZuoDBzKeeBBEVHj8EOVXK1f32chfKTujOSopPyR3cTzJMT7uqrThSwlcr8uGc4M81oSPMZJ1oz

AtuKHth3oMV2yDY3MIyADq8Wyg9WtH3V+NJ2z8OCqi
F9delTwrJRsyKmW24OW65orZVskZBXFjibFWjWVdLCh9QErZ7M2sy3dUIklRmHKVIQuugI2xIrOoW8CS

zwo6iKE83mpyEBJTRcMHHD0cP8bJEB0ffIlHU9jGkKtDNeIUj/bRxGwA9rfuwwh/4fJio6OdRMvNCPSF

kPZzHb+lli0KtxYw199RAW18UZNvMNqgOCJNr1Fbqs
uX0gYjJssF9tWejH8YEOp9uDZx+NsvZr3xIn9Ajxjhpk2uMkunlPv9fATJXMyiJcNIYpE8Jmgy5sWaxC

5qOibKkoW81iDukgUnVmXyfZ8Oiyp+arfx+u3JdsR5j+a9iprLuyt5Z3dJ+1WuozJrSZoCultFMfpf/l

HY0jTmr4hP4dMKIjRoYA9Czpuri990mWKB6jim058j
vYhWGLMfoD8Mx8PlkpiJUbdzAMnYOmyoL3c59CEzoQVjBkjUiLjuWgSeuQkDfy7NZ7Dftuyrako5PxCM

q9BHx5/3vpnORrRqBz0f19kDU+8vaiB6+7rPQ8bX0vMloefma0sTnFd6/BZN0kPmA8i8BaexzaGoir+W

WKYI0goJsjZEilbnx8tiWkU1s7hfQwzA8mwOvHRDtx
o4hmxPduAttTECkn1Pl0mtsSfBoH4IN9Yd6ejqk1SqcEPU597IMu6VIIsRE9fBPr3o9YAKho8mnFwZjL

U3T/xGx6A2d169ZVqjYvlIBisAC6X7+BCtJx+FyF1hAIYx+U10XEd5RqToBYvTFID1KL7U7wKH+xffbI

Obe9LqjbUvfrPmEqThUIaxAuPU5qeeLf8CxX0qLd6O
BhmZigWBlRFtspPIFSle/oGLQaieHISuugdApyNG8joOsanj5UOrsynuM5wHcDpEVtNiLhIKSXRLqUwd

lGdQPWXHFQ02UFHAb9vf8Ocu4ftnzsw9EjnfG1zH4d7PchmDOKRV3h5dRe83nPl3bdZ+ZAnUmRaicnaR

d+Dw1Vpm+Q0RdqTtcRXiZ29MqbnUIBUqpdXQ4HnKKs
URFVV5ZjkVqSyqxzoJxTQZf5F2t//9FemU2ufLSMR+F/zWkFmSoYlHpEFaVdxLoZKCSwLxXqJuJRkYWn

xlfgGXW6L8qN/wnp/K3uQh/H3/h5MDyS7nVY6mx16WoFaeRrGv0zFMv9cnH4udQgCrRDUNh5AH+XgZw0

uOqV4GqQjdtYO+ipyx6fV2EYAps8aVUO+vOuUm2myF
VKN0+NRkKnSInpHM0RWwxpITBDN8kpOndtU52TZrAi3unASSfJ+TfEjUp5XLDjOrIMd138x/Rclt8VID

Na77zFWnTWpfM56Sn9lb819VZBH0aoTaUWwnbmTYee/XlwyBiJ2Urf79jHx6PFz+vKJRMBZ292E9dbWF

/if0mgnAApkSYHDHWqq3TVeiA+4X37f4hsDnFMfCtS
uA2K0vFwGP2OGVEL2XvQOlJMk8sFQ6KE4oTWeTEJLPG5XT41TrP5KOBHeW8nN2BHcuVVrae+axgu610e

Le1xc4f0ynbmFHgysS52wTbbu7U254VMIIWbOZBFAAY4QMM4YWOmD6Hn6vb8uiqStldLclIm5Bbv6B7J

sNqhkskp/t2HS9rxRl7qHmvWvP5o/WjcJWWgo/9J7N
bIbmGpLrO01dSdf3hnfyWtJhe9AYI8VcN4W9bajHQnk8cPFoLe3G4ygGKvGKGXJTsw2HQRrJ3gOaBobK

qFqPved41vNscROjmdr1PiasmmAtbaOkTkOONBeY55nNY6WoTWh7+Ey9Tcz8Js04t8toDqPymU8MWP5f

pdtIRzyvz3VQGfSSLO8O6aU6Hbuo7N6x/ANww8M4SI
0WIpX0z6F4w4n7lJo48STg0zEpNcRQ2s6OXRBPcCff0riwLJrqz3Qku10upzfe8TJC4kjJuiVJuqa/Ko

ilqjwQaW/w5pmsyFLnLHTdp9+FFKkrzFiz7dal51+E5LPRQRGGSW5q9iH/3X+AosOxG0VQSHlf/OSWlv

0qC1tbHMjx11pjpZCtGTNncWo5U20WXwtaGONty+33
CyfiG9WK8uShzgqbC0kAnnSmQfQCb+VG9S+7f0fLRncqrAyPP8h6RgSkGxQOAenhz9SuI7ovz8V3MHmx

1wY1msTrd3uNsxVfPTLExXuewZ6covy1zMUaPqHS0RE814y8kJ5vUVjvhEJ/3iT5ZH5jpmpACgQ+uqdr

NFxvJWhcQ8CYbs43nxjfMKPKZ+QANGP7DIs04aTRAS
f24Sl/3lKcwNpkLN3x38PpqNMNOc1HSKDwzgiaHLEfXwR0it+VdFOpWXfnKHJ4qOwEskrIFEIiY4oruG

IMo57yIekLaDw4XW3N3IVKKIkNWEXdtGV5ipGcsPJndKnCrKuBvelJxctviFKSd4r71ggwDmzg6km9Bo

hYYSOiamAzdFdS/yTrov5h0Abaf7gi3GiFOQ01MZTC
DWm2G/IdviNIUcegJazi/XqWgozK2xvTqts5qsh/x9OCvdhhmFjhDTUy2zbGFq6yStfE6NsYEr9MAT1k

Fj0c01wemt7ryy4vIYXIsSDxMz8nYm41lFOoylg+V/z+M4Fo6khg5+Aa61Xh3PBxik/FzzWpqK/rqd/g

KdQg3vbvlk/9EpsPP0ivdSW4RrG4cr0tpt7XihMYRY
DlTgFMhghf9OtQo4DF/1fT4vSZCF9K88oiZ5LoJbHu602V4T7n40B1oYmDe6uCHtmVGnNhF18VRLegYK

vxiAP57w58G2OW5arcVgvP1enoC4UNlrGtqgJ+z3n8d1NOnykDP3pibVcMV3R/E9HaSuLd031lFq4oB0

9Yb1obZFXQUxgG4L01ubI24fk8G8IVS+CmVUpuoxie
Q6ZQRKvcKVbdUlPHeAsT2OUlsftmEO+L37pc2JYlJk6bEHhvbbSeG1AUQXzW56v4UqNoHb8RwyBycOez

YZ7LiVWVxt7OQSnq/j7GyHNpsMtNlLyQBLSxds/JBlhdGEbhZAGA95EEZky0sCAj8cBjmUgvlTj9ZvEu

nl3doazBLy2ffS5izQfL8rIdknhZmeojdTrVV1gX/I
gYj8xAEN6z719HnjJMwtjpmFLCvwyoWFgLcWNT5gGmnZEXnuqPw7ChR/vr6qEEgn+AK9Qi24L4ZyqkRf

d9xJ6pmVrdKqZhiHLxuq8mWsKUcVG+wM8epHcUQCwbski9ZsJA0wjjVd1NfrFHaZWV65dn5ZLFbo9/Ed

Hi/Bt5XpRC4YhrxrSVphz4mP5xYwe6TOeYbyldol0y
RwunHHt3LtGCYvt+GxZFeS9LeHFQD8YOcj9Ue58e9Tn4cdluBobelUQFQfnNIdpzAA2OHBCVo2gixzte

A1RSEbT9ul+8FJ23wAKCYiIQWjfAlR1X02mS8SY37fgZyQseKPy0XfHbw+WvO3zys/8sVYGNJF92Dtfr

o0Ko6aakEdFe92cgzwSDTXQEnA9m52lGdqRcH3lEfa
5T0tgPIXLIOQ7AAhATUYJ3k/bGO6U9OKXj33YdHe0zK8oAYtqT+Q/qt7/dV6lUJr8xXh6+d9xuFIgGXi

d3B1dy4QWxcbohWtwHHFoq6VcTO4y/A5puyzHWujo32+zKee50xbAPPAxkx6dDzJRUoJSVbFCzb47FtY

KZFKQ23L7RK8j0LjZPmdmbZn41EJDxDOnwMB+gOCuE
xxlO72lpuXnxDScbcrBx+Rj/WodNiR06UWVTG1hvhhBIY2B3h5bXjVAYNalgjUlI7Y9sBgt9k3AFANH+

M+jxe/tQdwRfZe0xbUxADenf8MCm0V8PnVb8dFMR1RRLiEgu457PDEyFNOTs/lOx7qSpM4vYUfwoCtOR

AcblSk+KLMtqdHEmPInKt/V9rPvvcVPWc9Ek5r4ziN
5YQ2G2eKFN7m2ueUdlZeXdXBaY7bbiF3TheXSrQGp9XjjreTuXeyhawnDUHOjd9EtTIp+1EWtM8od3Z8

aOeGu+B0YKMtGx71ELusmn8VQM8bAzFT1Tyh0cxx6xl18Y2I2evAqF0axE2z8y82yocp9TjkcyihQrA6

4/FYpFQgXTkAGhkosm1j5g1i46vujD2NrUqAUK8xkF
B6BW/elE2mkZnrD4tG4BhK7ISjlmlH69JicWkYTBnu4wNp7OpRgieeeeEYUoznwCJ9vYkcIOy4bCOiF2

RhrR1FLEYkWf8TCrnTL1eGLSc2ilwwmEqIckmasWgbSxQyjiRFftYGuIVViRHnRwR/l8OHh5kGVJOTmH

Q+6YvZH3FsypMT54O3kH1t2Gkb7B8KgE26KqRxCGy/
uUicSUWxb58xgSCorDSHRLH7NKhi3Rt/GapARQjevUFUwmLrZAb9AK6y2yNJhQ/W6vyNBTBQDGL8fDia

O2U97oAL17+NL42t8RuUwyoutl/+iC9Vlq55bDem6CLYuLxkp1IbboxTwrqufGiK0KVdu7Fi2irLTfCr

zkV1idj4eklXwnskk7bfTPOlkF19Xiwe6IorrN6ojX
1sthn0Q8h1haUFd2WiFFQdTSdkt926ATMBHdzpvxIbv7bpiZpE5Ay/Js5Ie5PJ+Fr5YuHdUJoOdmmH13

jfoO9N00Q+Ewitlp59QtW0TuEINV26h2g6vgOhe440ofZ2fEGy8L29qu3L6nQrkYcvAGDee6b3nK4Bxb

48q/dpE+HQ1SINV5iySRgBB3qhnF9ckhusu61GQ0eR
1FniZdfWRMINHZTWS6ZB7pXrpdrhnYAXc/w9ytGiuadat1G4nfhNNljtMbuOjz30g5xLsFYqpK6an1v9

lnqK+vtl31TNKR4jist579IJR5Xk+2/Ld18dA/GrY1AC0qORr+RO+fsaKC4wPErA7kPrwXa2zxZ7lqvW

2t+yvU9IWOhFF7+GUP22HHC6d5N5hZPA6A2kbQiMrj
RGKnghnALPsbn8cOYJm2BvGREUJ9ATeR4qf1OEZcq0ng0dWjwiIIAnwS/gz1DUmI3G/6cL9j5wS/mz/z

P4gaO5fV/y1eppoQpwh2K+6OrbOp5commPTx8fI18m+kVNe2727YHRy0imWMKxBEwikAhzXk0YaNY0Fk

OYSjSV5H8Wsh4fqqW9XVC+VZ9BaPAXN+BLo6hSLmsm
u9/6AjGlnZypYZienbzpwFbTJYsGiUPn0UK9q5WU656864QOPbEXzVBPFdOhmXYXTVMavRbPET0SF40h

2EGflF0EbyrDwxLTJD+TeoOoCr1n2VIE40DcF7H4Fv0t6XKu7TwnrCk72H2BX7lhTAyNu6LdhXL9BsJb

F6NXsceQt6oMypAs5yvYLUFOktpN5V4aB41/J6GA6I
kWhQq3zaRwgSMmgoFcojZJT4MUu/6HcpXFrRSwc9MrUiYliZUZiEFa/sExtm/tF1Ven/wnNkPK93hePI

L95lODCsN8sTA4+Y+DvC0Ad5VHS2mXVJcDQT3kWdpyXMiA4504wy7brs7Y+r8M7CLFAI22/Xpp7n346i

dX/uR8ZJ5zVMf2C92amJerEoqsCAgod9xUszSTbt6u
tWeyc39rsqsAHUQ39Pkp+DWI937w9bJVmW59QW5uM6JtZ+tx6f806530W6+sqPdXgVEJHBPQFnSiFKsF

rLcfxIPHad2Cw2ytgEecCSTXjOYejBsx4hbdiWDkEr6LST0jsPTJYhI8OAMtoCUNvqeUrMLLZPFmJ729

RCWNW01pkvICqPIYWvebwx8/hy39KCF1IoenhpZvFr
Xuuvra6GYtzBulip3RnzjEqOJd96AvYmMSjYfxz5h/WWoxV+tzJSlD7hT4GrkwXlQNHN0nQdXSq/eO1z

8t5jSekxf36qmZOEArN/350Gj4/iItdp7a8wuZ9I7K9ewX23zC89CvVZMlDgDyvc7k3v7Bu2Qwk/ZRZx

f7bhgZ6ZmeGQYNG8boVC5tkiOFYOMc1vPZxqAEoTJ1
U0dBgvkrsI3ivASM/4lOCzRGngq9nq1+R6VPal5GD37LLWzXYsWtf6SXJpCWE2fvBVZYfRnTc7kO3gnf

JOZRzg6P3/aYp+UFJr2TKoykcS51/HveRicqqK9KOFpYTcuRr35vTnbnzIjV7nVSANtqfClJeauyBK4U

qs/Yo0tZWFLa0aSBIPYuOZPDQAk+6ahB7TJS84Zvu+
DBomLFG2CFWOvkYEmRz8n5lHDj2YIsGBLWsqHX8BrC7+zUMsVyPdxGrrUfK/zwH5scYN4XrZkxi/n67g

WWXDg2icIlyeEuoBebhGj4vBALEg/57G7mOirb9/ym25NXVNXJbt/Q5rHeex/M67tDrT/pYh0QQBMTdI

1JyCZfj/ONQSppp2eo0B2zcYkkaJxFSsi0IVuBwIBA
3YedCXa+8gR6u1lH0nXyaZQDzCbuWaA3Q74r3hwbLuwpPyESAcL5KDuwY1Us9dKvd2Ri7wfgNFYzLs4s

P9Q6eSh7ao0UHJtLTTBrvgB3F2cnNhIFtev/Te4P7Q0+TA3M8w2jgkBk5LXdV3Qn1gEEu+kq03EU0h+W

YnxXhVobUF+hunFRygwYYR1UUzfy0MbEGlxWXxcxnJ
AREjXzH5ZKMfQD4IoU3sJg+tlRqB/wtayjl4qQj3/iyrKpLBzeOzLIYZfBbHf+Ukg1Br9I0rc+ZdUeu2

+FlyE38L9Ybcnh74six+H74JKqmR/DJEPCTAEtPa+ABaP7dTN/DVbxMCNx/689NAYxNuAbzb+9rQj4j/

u+84Z3Pfh8iHjb4Cr3vM2F2ULYr6kUYQZCgAPzoRFr
D1GmRWWE4XslXZH+X9nfaxvhs+yIBX8e2JUvXLgmrtEmu+XNHe8WaMLSDL3Gj69TC+/Vfw0M+Ffy84g0

aCYBDE0BL88g6f33NN9BC72YgQ5F5TD+Opp17MuJ7wNzjmQ8R9iK74D9nrsdR6eq6uwDAYTiQ3JjhAEg

HvP3FSAiwr2/jcdFiOqHI8Rb88osz6MaPjvR1KPk90
MCC7oTP3MVX/P5n0MUzzvmaOr2W/cPH/4NtKEc0su74nTCwcPJGeoaYxNlNXaftu8ThUj3Pe2uNddX0R

r0xIDjVw4NDwD3Q1d+Aj2+mi7nUt4QqeN/PIImR8hPAPe3ATllmhxAiaVUb4IvuO1d80lwb67Ul+s/rq

9766DDamo8qdyN61Bmjw+QLF/WA87Pr1WKFz9fVmUz
3KAgedYtKmg452RezJVzDE5D14pRcSk/FYGpYioohd2Rm8iXK8IQRes+ki91vVtz3g3YjxcgzziOWoOf

H1hCT00DtnNOYafSW/AO+I2RceNow6gs1sNB81MlsBUnKCKd0CPM9BREzBMrtqcY7QhoWV/gL7LC8LwK

VQ7WAndz5mzDUfLBoMtWogynQw9rbtE+bPjMORoRcI
sd/l/HTVaGwLe8RdI4SydtlfJ4ap2Puz1ZbGk4DjYpR5LmLUA/1oJH23WR9B9V3qh82kT7k70HVrbxN+

DkYHjhI1BoSiVE6ox0CBhqAi6Gt87Hlgy2+X9dgf0o3bwQ/Q4qE1qCQufGw86s3c+1/rgtv0zdm6FV9e

Ju6uw+Vgr37gG95DSom/4DdVjQhN30seI33PN0oU5j
sU1IJ1zWU7wV7SK+oKz3ru9tUjv0ofz/D/6Nw/bMYCSp23BC+Mb5grsY0DhtBviPV3CJYxMuwsDcngpj

P83hBsMjGIb4P+Dhy7rBuWZvSclnWST3VlFgKSRE32e7LMNnMQ/0gpZ/cSTN0W+foUmJPeGdk5kHqOI1

EguBx2L16U6XFO/rwnzviH0t80t9jCuJIl750JVmM0
RAhhaWPh63c+TD8+/e9liie1op58oXu+XbfDCMuclg8f8p7ZqzS/8ITBv4qrB+likri0g0aKF+D7aQ92

fZfaV+ndWi2mfhpysJ/UWL3+ZHsCT8359SY/xxgzIr4qlkFVgB55tehNoFKT1udqss0Ghc4oSwFAAoKT

UgA+aG+HXDogQsOlnMUPqaAj/VyfG55OjE/6be3OjU
dlRaFqWgZ8xL/1rBOHob4euqigVKBphPBd1oUCEyqmd+xoqrIM0PA+K/81f2LX/vFzScbi4WCjcQY1vB

7RkQ/i7t5gUqQYSU+Bh7U2gdfUZDyKaT2c6YmUwH2b2JyLQGFHGH3dQie06g509Cxv+ZKY29ha8syxY6

dury5fxS1TChbD1hhPs1BETG4AkGd7D3YNRteLcJ2W
sG3CjjVoVKiC83/ohLka8cON6y/fHMkolsJfQAo1wFDRrqs/uGod/mk/+Fz+OcsbXce4q5OwyJTzVSG8

POlN9EIlQPkBm+Qr0E8VrdLDy2Jh6w3rdMJk2x03+g0ZsnQgJPr5Cp6lXryEwIwM8YPmVqGF/N428eko

qe2l95/mfjq2qsXgtC4jLu/uzttYA2Xel1QJoLjP9e
Ur6igF/kNaRCYEqsJYC8Xe9tFbTM/5VsA+K482xs/miCaKl3H/2gIwNP/i5LmRl8c+83gNFbDXaMEGUZ

kfJ3Bjs7J/xJxsg4u9oW+6ngra+f/y1JZDC4hUWp9iXkL2cci0mx2bVmXOovinvHlXfkne1QW/BXwH4O

/VO+/R1qAbHn6UvNq1wP+03XOfzWwHJrCWSNfu/Uwo
9LbUa8Zjbr5kfxus2ZIXNlSm9WDGJ505wG6XnBJtfgg7WG/0HaemKgCBGN4x7p4akCjsiCTLTlpt30AX

uZvQTihBQubf1f9h0r65/l5cV34rkY0cb+H42wBwV66LPMwGbqHmUVgIkLI8kNyZBzFjrQyHy09C7+u1

KA08X9tB8fu/JmvgJK89vqMS/MQJ1BHs1KzH5oA2hr
VuGXspbpaIw+5QCvoI+jDpKJxIqy+W7d+fcdp40RJ0+iq7GyQzxB1SnOTQ/atD/dqseTy0i+5YB7Z46c

Luljad7j3R+EDYq04adVDLFtwWB3T8sPyS2IzPWy9s/Sl/drxC+Q1m2yOZf4OBr8ujpcCfz7qzoLaKxN

bOrSi2K/RSLlv7sV/VGy9kEGz0Ywy3ELOvVoh5Hx/d
g1I4N6PgsmxHHozx3fN44G/4DL65Vbb3k9z3rYbYv/08yI/Rd62b6xk8cH9QlfX2P1S+/NKePOQ8rhMJ

p1SkLo5Ko6E1dN0R89Dh0tHljK9KEJdhC6mw8j+z/iLOyhb7uYcX7QfH1Qw/EzD1FvR+kEEH36/DA9xG

g+t62ojXBEy5E3yAmgEk/bDqzZinWISZ7mQuuPZvdL
ayZbF3KMBU5wfxBKZYCTl+UCEDaj+Zl08HgV78yUdgeqsDowROsDDUrB3GvjHb06Jbx7s7HLbRK830pk

c+n7kr4+pDObKWNJMHWlFkm0U+q2pq/OmPZNyw9fSP1Z4KcmaOQ6rgns4neMgsFtB9MT0SLE6KjqY6A0

FMsjzeHn2p4avSoE9znBg5QvVXpJ9/Hh1Rj2IXkrws
2OwOYuTODuEafBg5/ihb2G/YVBCguYTWRsa3nz1i9wGDCQVQ53nEb3aj1sEFdF1QAMbUKIk/pG0cb9Y0

4MjAB3DrX5Mz/PysZJ53FN/YF7bcTKELwOy9Zitb6g/48avZV0x+dHzvjM8bEJSiULFWbd8IaAUpQMj4

6BEiR28goRbttxi8dLlGsuSy8owmbaLVuP7laOvWjS
0U+7xO0WXpGZ8qQqJURR/tJcv2dmLlHugt2G45Tp6y2e3b2U0mouokTsOUPX6NRHINcudVOXXhoXvqfZ

TCiJu2WP8AhD3H9ZGkinca9UXGqBG9Fr3HqBYFdadEzXX/EcveWnezye3Zw+rD/1s1i06y0062XDrgrS

Vf31fwXjRfFO4xztUB/kkgRggA6EqOCXy5lzGRuGQR
eIfvtejBFKA95STzywlKAvFL3k4ohEysfvRzSecvMkmotmJKxzdyl0MJe2pjv2jSioTJnAtr+0320e/T

UR6EXLxhtOR4CHNaeBIfi98yhOmw021Av5pldXXrXFWtCILSzYc7jydSb5m9dl13/oOR/IK9YmTAk4BK

L2SQJ/cetHsKYd4pTTMHdDsrbISEpmy0ubEaL+E43c
4ZNgJ1mcz9gThz86wYLZeBLyvAiSbEC4oGop0aqMGYQRkBqskWWLyUcJG+DygEOtwg4iJ6xMGT6G+5Rh

SSDuGW6zfO5WcFrnS8TER77qpWvuXEIMHg8sf7kEnoDKHiMU8in7VuSzfQ5JS+8WV6C88+5oEq7ngr2Z

G1cvo8zxWfGKoEiBlR4je1P22LXZBqHi03vOGKfILY
FSPfbRJd7iobf35d4i3QYBwWSjoKUqf9fh8kmh6hhEmq7lQcd9a0cw0KqSw/CVkQIzQ1Bgf2PcKdhie0

01SKVutvfHxWszpVY5MFkmkXFZy/UxfAmA6fFzrEPOKGPeQ6ucKHBlX+VcVF4yJJ4O1O1FX3oPo8ZXsI

6lsE4yBwBPzRXYwoo2+EjGBWvwStzgrXHm5mfngDto
JW6YcYSsI5s0Sqn4o0bVSq5BV0koJtVEIOU7qYL+lXgiuYw/KXxqXxoPTp3Ufys0FHoNPd4wn+tqDP6+

JHBAEYInbQxolVNMjj31PZIpM1cQPLAf4ULqmYr2PesLl7MMc/h3pSplEj3ztJWeVhBEgSzLDxGEOllG

NPi5NHq8JGaY5lROQbKPvno3cE3eGG0ZhjVUpkxcaH
UlaLoBhPqA1QJSx3lvB6KQzULyumabZuiRW+R8VJSDGUbVDnZx8FzmnjhJ86vTI0gO7nw1n0u5Q/5r6+

V/Elcni3dOkyvB+lkqGQvoI92CwKjkppVauUEhP62wNYPfX/wjOsD5R6gXy1FjcHGN5f9kUOZjv+HnWs

gf87nDkQF+k7MN0GFVFXntsTc0Qx6Ny49S3dHaf07K
KGHHDsHjvtL1FskkOOzmxHG93jh6n6vDQ4NxiZlYeo7pMapYkTaV25jDpycxtzK+EpqRi1wFhnQTUz9p

lxYejqdGCTqMoRgmQpfsujvRsRDnAxkxCeNOUqvubi4ossCKwir+KJCtiWSzGrfOlZzvZZ7jSvLsOmDE

PcaZM6OtC10Rbc1n2fRmGOa4VBCuwQ20X0vuDUJVGT
17443dIjfcuzsa5t+8suR5XCaHsjv5gHnoceNCifskvZMcD2R564/io/yp5T0GUHGxhxuNkz7qwfFso6

O/lf/+FnLtZiUApNfo4qu3I1LEppQI2MPuefFwLo0C4yzsN7jaNBEbaoKW0lQCsv1BG0JMi8vl/56ZaX

EvnFZo4tdYpd6dgUYUDuAQc2snBP5mWLM+ZC9SEvic
dXHy0utu8dIufKby9q6246nzBfqcns74XN5yHjSyGdUlbvJSePGbKnsqr18oxFD5CFmtulPvmuCtGuf1

kDhPKrEtxvtBQ4etdq9hJNQ2ykgE4pLi5mY16z/Ygw1XbUWKcU+HknalxVBRY+o+DpHG/jwQdm1zLEYj

OFHoYhpq8igZI9RSggroDb5RERVT26bKR/UVOs/U+/
4sa+tUN7h5ZhUteYiftnX5Qd25wlnDouVZVSdJvKcIxjUS7VPSH59F/FLXm91b7Fv8adN3VKqR119f+S

Pn0lC+tgu2VfuiCpSBMfy34/deeTapGayQPwsHkLyVk2dzbIh9yp48Zx46qlefltsSXDCyQaHPYxIiwr

dBQbfDzuBuiyZAIxPkq20UUrLGnajIQkiOBEMD1buG
zGSvvs9XwRp0ePJ6blZeu0IPqMDC3+XJIEe9bGS5b8uPekD632CiuasSY59xnxXfVwUuxOhstEncmgWk

QC461oItjwQElxtvCJGNyWeH8wu3bGayCFyKwx0ZRAEZLia6uXgtmFJ1iWX8IXQq/zyzWuiSImhq22UL

ICLFs5O+N4WZAxKIZzTThQ4/Lw4QgrGBaiEohn9/oQ
d+ZoFaSnB5iGK1LZ6sci4/L1078IN3nLExL+duxBlR8F4hegMW/yrSQoVddFcu8I5HfhIoKRzGDfJAOb

QVWsr8vwV/AnXg2XGd9WfUFkJuxe/6HHdiRdPfU3E/porR7Ua8ErqLUGO0fKAHt7EtM/CY+AGs26qpER

VbYT4w52JZGybwP69U+k/Encmn4UcWt1wolZCCcEyr
KBeBO6J81JmXjfeDz4PXu5A9eswWGXmjWggVEX1Z8T1797J12p976A574Poevgzgk2xwcfId8Z9WyFsG

WXA8fvo2YCwIJfkj+XPVtQd3iTaZGFeKcv+iSN1Z++96FWaVeHEUEg9+i4i4qJop0hvHupDcBo+kas09

aoyy8InO6ThiZpYfg7ttKRfU/6t4BG9VV+D/+UNQF4
xZr7bcpcG9HxgIUpGDnuB1Zvobevl9KOlEvCjSogWScZ6glQpOFj+v9A8hpUZAGf5Iq9XBVqJ2CmsKlH

0Eerdk6Iy3KAbwfr8nj98ETs7TpD/2rHcoraWA7UoEv97a/sEeqfN+pJaT35lDZ1/rvM2g6u25K8vuR5

+5D32OtsTIgdoLOHOgYfxC8S3/mW/+phS4wVzunYkb
hAx/vzgW+TmAysI8YHm+1awsrHrGvNv6jKLbFh8LyA5q4DGiKPuBXOYSp0gFFxtGcVndcUBpiYG1ToUl

ubXtvcmcF2zWZa29C+qKxUdNFtRWkDvgStVlcMg3IK/msUdG2LusDhJpNmmpXuLTOpFzfQjMEcmrqUBb

S2U/bOIlKp/lgTm0oIseag8iee3bmwg7y3CrXJYcmG
sS6IszZOw+c3q7+dXfFSt8ZAR0rLFcADfza9rab1KtTxQZluaVRtTPevY7P5tdNOW2s6lnwy/KdFGhzs

mNs5o02CbWbiy3YepjlboQvqEm8/TlIfoN4aiKcKFhuoCL9UZKtF25s00Tw7mesDXdprtU+dlGcKWo6/

GgrmQJTTZjjPflA+2H9Ig18cm3+NPlLp1X92x+X8uo
RL6T+5z5Gfq/IIyqF1sZ3Z/ffxSRuC0hVtWuJxpzXKtBPav/5djWxEAms+iujHNaelP/prOp7o/Vi+bq

wOWsCylvYrTTjRCPGxF0Jguj+Gd91Q7HTwWNfaEZGkWHoaRrbimyPwtN9f3dlyQvpwiuHctmFcrJXg7a

DcB3Be0gEkikP5IbgAY9nPBcReuo2eeT10VVA/+aIv
nTtfkCop099fzjSwWYk/LeEGt1qN0Oz0LdN9mSGFDZezM8IBEL21uKaA/Y619NSN60qsud4fRv1kH1tR

SzmgLAncCdjl41blAguhtdS98w8EPJawErgdaNW8VSR/eTVjDCmNzzb4wi3s236IzUrpBBRK1j+UVrql

ADfhRRN+o++Jg6ccwiFCqkd/6DlLSY/+20hw9jTuuV
NPsurK8a82Kqo8Lni/6R2eAGxmh9P8jeN7VMbhh3DdLtrRx9/VPE56opEEuYb/cHt7E3msnkyoLfB8hk

GGSQ2dwi9U5JnNigY9wjdY1TxfjrPRLJ/1EwLWKttyRjJ66J3OuljmMJ6hB/dczLjZqCFMIfrofJBGTG

fJk/n11dt2wlLdonpStyNLk/MF5vQ6fJ40BBWDfxsb
QQiLZtpe5qL21X9nV0eYwJOffTst/tCMGc81pCHZ1dTNuq3hUDn+vxqhIWET2s68OsIUAma+2zbd2VeP

5WrSlpa/vB0SKmNTir0H2PIPeIY/dyCboSXw0qv8i/4t0gwEjirp1k37k6GT8l+uJ5L1WIiXRtBgMAwa

rk0+ZdoDhdjpa29fWPJRkFDuj5OxnohzkpmJCyfySt
HYAUkfcrlO3/7MLGagWu4CtXqubYiF5WgUDaw5adilmeEg/XMVcQdU798+k5YsaHrNXD2Y6taA0wa45t

5NxM64TIzkaQ2aCc/9keGU18P4eE2+fxNeOqqM6dnsd92QB1khnIIFRvG1bftdmQdYXaf65sLJqd8YtJ

nYcykOP6YhOfBI/oUxbuDFH6aHZ1jdkEEqFS7YLjs+
jdO+9GqbRSJjHr4uqNCbEkezVOEr4UR1K+PjNQU2zLmbdT3xn5Ox23OThjIIzYrjI6QMBrN92Y2Owqoz

xYNq3O+rSB99E7ru5ql2QfOoK7snVwe4l32dG7qqE076SyvzzhTm/cPpuIF846Qh2zuxQr0bEc1/BfWk

EyIgWhqd7PE0CaLhIT2kS93tVOUJoaiwbsuCqWxBuq
Yy7M78DV85FQ1tTdmFu6guFrhTJGRPvBfpWgGmbbRI7lW72/fRiVS8prfbp5B3e1gkQPo12ex+8QcaKV

oUEo+xgqTURvki9EbtQjiUOJikTja3AlyAOFMrdvwd06+JoZDDL3nuhG7hP+GJns2ps+PxJ8S3MFqkac

l0K08mMtQqmA0SUS6voxgi5D9T1GyyWPPB6R2HU3TY
pmIobUw+peI4P7ODIa30db0Lybi9L92JXuwbfYE5CfIJxfq/y9ACtOoX28ZKr51uwMYB2CMyhhRA+J56

1IQtD+S+Tt+DiV+NgK+WsmBz/rx/vH9kuGWG8jPfac1JYmi3Z+/1Wf/TKtgbmmR15CBe03alx0fxikD2

tCjKxpn4vZTl3DhHBbVEu2T8Oz9IIaBY8opOuWu/e/
u+jTQQhpeFuBmBwzMKN1ujUZHTVLsVNWiri5AjyB4kGEx38K2TtXV1Gx8KcBc1QpwLIehqBL7vvZuEi8

ArQNo0FbiqeQvryO7B+04Cj3S8CntGmpJchtPLyZag5mwbHZwS0+wI2yc02U14W/d4vn7T0sWHDxKlku

Sx41jK1vd02Q+WuyT3tEzCq5xgRVjcvvWLzm96i50L
NQIRa4aQY4/sap2B2yE61QyadVKk4eqQdax7T3ko2vAcwrhjwfCLJ8lf62JWOwA0tl8Z2qHXOyJ++z4e

75dZPyXDvnrgFeUZeonoy5yAwdvfDMoz+c5C7Z6smx2stZMxHAOY21g0/9421GNrl39iDffbksM42iY8

itnhf8Ymc7X6jfvdywEKhmKkdelWubtGXRIQVvFWIe
ZbVLU97JMMJifOfuVXgW/S2imngr3OkLbK0qvhOh6NFdrJlH+QYMR1sfjOR7zAReigeDPV32QB7Ta2lC

r6dE68jphQPhr1Ye07v8qoBeA2tWZSMkBt9KBRvzjLOe1ce9ZPLAswnVLuyzebIta6I6hoG7aY6SRilj

mF5qT/Gd9ktbv1QSxUDNUbq0kyNcWG61oYo1xqduce
9knnAdrTA9vktoxxHih3PXw+Hd8tQwUHh9XxvFEvOjo0bHr2fRFyUa35GH7T/1GGI2ikwUyj5gK2N3jt

NRYau3BSVEYz/M/kxOJjIKlIuDXw5miawQty7uurij8t0pIF6MUb0AxgkahX6LTXtKdSW+xMMNBzgPev

BQcYqSF3PayvVyNlmtaCuEpRftpuX6ZUQhpg29GtgE
c/nx92+P4ZQCL04GBaA4JEXgBtsept4cjW20ueVQ03xPHMSHjpqLaiNeds+F3CgC+LDzZ2Mt7AKBKJLZ

1Xj8BOd3/uyEormOM0+CDWaoNjN41jvUhUoOlpQPDGAu5vzDV+cgP8/FIaBdz6MhM4Nl03F7SBXM/TXT

Q9K0x9ZmmXK6vU8F80XS2DsxNmpK/OiTEyVjUf2FwY
ppt8bSKL84FxcrJjIvlm9ORx5oTUDDrb/Y51oeCushPWuX9B4CsHedcsPfBtyXXVV/cmjfxWtPRuj5cL

8Gei27/+FRH0XySYNjuu56iQXafc4exVuV4fiSOCtFyzuxrsUHZba1UME/As6RyZff8U3PDb6IlcJCAs

9ix5JusxmsYPVybFTCHekRtfwyVaQ8/ZH18mBGOjTG
wTk/3umcWqbW2ndCr7VAbym+tALQhTsBC2BHWWIpVrkNDzN0sYGyMNhuXf6xdUDd+Er8t5wMfhvNofRC

x3EICLpkCK1ch0kXfy/kRYsA0TbqeDl5v0k2XrU21c2zfgHkTjzdueerIbxgcz7tWj1evmdl/p/MzUu5

WRJ42RbASVWQAkLJFkYz1g+VsE79pWiDJS0P+VHeb0
qPQAUNNslTIS77egsnqOHalTm+Ul3lc+VRe/eZiLIRyHORmvGigc3CBrSCNpwW/294s5jQhVR2isoU5p

UsmGM2SU5Qx8Db/6gBjmMGAaH1knfUKTkHxwSozbWeIYiZRVIrSg23mdvFg+BiTxOusHEg4TM8w1o5lX

B3rBqTHPqpv2IXb3laAZpcraQRPhv36sVFu0kFPqFc
eqxojHvZeK0QeOCvy8fWLi7w/bBMSCf+/rxnCvNW9dT6cXTZm+ihYl195FQf3vzF21Zv4sfEXkxs64vE

oCz4ybSkWQXgU/Q2HY5mVYpMfSS71Tha+k6Q3QUwQL4SlKgidT/c02o3vYHW7cJHtlvqjW6IdsAdB2dJ

7W/mwiekpUF8bgk1x1l+OOJQAcDy7nqnqrMUrXLgT9
v7R3esIbq7TpDFpSc1/VpLnmZxUwsOG85w/+wb36og/G0aT2jhH0vPHFHV4EcuHsbax/gVLoeIcrgNnz

HU08k7WrcQMhXPjtTe5Eog7wgdod4zu+sGunnWKWkmm5z94aGNclS+T9116EsGytZwwL4hb0wdI3fwgs

iDVlTOClMAMeRzq1JEUg+Z/lZrY1SRgXkkiwT85QZr
atNUNmsjeiGBnn70LtU+pbJmzNni5zUfqqyh4Dge0/HBTKDz1sJIh32Ru4HWJ7xAoaujVsoyTE66pVpO

F2OtTy5sjNbQJ3K9/g8Adr3jNI129uRokTCL/HlE+su5Gu+TT1Z1N01bZta3Mqxl+0BZtGzHpP/x2zxN

5S5u27x+1UFSHXoF0shfVegcHR9mbIjwQbnkUt3dlU
J04bE1XTgLIyAtSKWk3wlc0t5iqcykAuhNg2kncojGfn4lgyK4LyEoGMDkIEr7eJCkQqfHbKzoVJjqsi

1NHdRe81zCvtRItNaX3wSRcPIlqeZHKJiMg7AfhaYrtVjk1OOgQPeYBovpz6oVNi1Hym/m6F527DDN1T

AqDm939ph48POmZInzVGrsvye33kXUa1Dh8s0nJc8W
7l9DbV+Y3OEzZ3RhuLzwmMECcQupltcvpc9JrwYFGytqhe3XrwipNVuUWLpBewRvDNYS5MVV6BA4rQo8

zmaj9uMkIRt9cESkVW9DJwrK1lzmtUX9oq/DNUEa1y507WMf/Zx+15IyL1F6dWU8BaC/eTZpTtwVdaV1

6dz73ACbFpnlVtk1Lp0CGVmdpYE59kQxbg9FfS2EvZ
3H7TYyBX4hakJt2iwZsd419tk/CL0HWmAuzwDkl+FQVCOETuvnUBBiLHh+LTlUvR80IMkySjeZ76Y0m4

DZFH8nbSG8tNPB3dYf+3kpuVGLEoWqipYamFR240A5Ur/GtQg/cFuH8yPooiwkYyLvnvr3E2kvDOU6ox

oYOYItlH+/J+4i3AWjsyp3VO3kVrQgxxfSasIlLG2+
76vUXEvmMlh3MXn6vgCpGmxoiEZFdutVy0Im48ibk4vEfA3GxPhkVYbV/vNVimSnUbeswmFcoiMfnxz/

/xj5kcrZ0PsHKJB+wt6snwu8dvet++m6VcvS9moBbu6dbE4H+KXa2kfJ1aqXqGHGQsXeymAHNpk1egG0

QDw+ObN5NuJooUqarcktt2/fWtDymQ/q7Ln2i41P3q
QPzGaE7mHK5nOJUKvg4WCSCdIaml1/DftGfdzJN5oKjurTtpskSY27fyMPx1yl/7Qk7Y5ms5ZwYEmBDT

iCME+P5PCBq7nM9ZUo8o3tMd2DRwGDpZRj5NmnquD/J39zkRZ/lLCNUdMTCS9KxThSfJk84KbcYMURfZ

99btTotg2BctbjimvEDjrB527KxE/4HspL629c9D72
s74RjullgHUcLY9vTVkrStXnyigXalrVlpk0nydxMoHgX+ZqWwFUEer0ZbN83AfimzeabtmxFxSStfCV

ZsHy48PQfYLYBVO2BwBqNRyycUtfSd0Mw20ltZ8Gzh5Q3pQ5LLVSRgP2ovv2I7sxRopeDRxR6m7Gcm/h

45KYTx66m1iF4AAMnG9b9/bPmmGgTZC7BuX4RNuyaw
cHZuvl4RwHLZXWkJimWx9XDUPdI0zPPHxZbyxL4RV6yE8kNTZyp9MRyjSxA67MnIbZIUFXaqmEZxKL6X

dTlsC3aayxiZVUwNLH7Kq6lGnJVXIHIPWHZtA6xHObadTzfFM5t1Gwrhx9VAUNdkq8cVii0Ol1AT5IuN

1X1tFs8o1Li5TGlU3wR9iDmkBu5Qj3zg10B3pWByXM
rP7Ni/Vo00JIeNDGhs4mQHdWd9TFziacEyKTadbahikxPh2Ih3r6EDHV/ZvrPPXYxuXM54TBf/Mp+jQm

B8vpXxD8zoe0SMkRB+L//jsO/4n/xH/i/z8g3TyZzdyUtCfEYUGva6U0tiMesqAtjpu8MCcstyQhrBt5

AbYakt8qSNEqY6YBS6t9nYaUOLfPeDmju/kpM5S5v+
Lo7KBLAz3+KNts1r9FyUlNskSr1KvNQfvm+Tr6zbyK3sxecVerKSjIlNvpnSjj5WSwyczbMwENOr9cbq

emgsJN7tNsc9ZFU80BZp6/casDslk54qrApWDPefdmkdCEQ4ir9Agm+G7iQ4h+iv2zvcniI6tl4cpeQD

q8hZ/ZbX2OsjzPHtA4VM3jnYgSmsEmgeLZ82+cPSPX
o+Ftlt8JU2uP4XaaJoIbP1PCF0jA0wsuG7bg2nVV3Z4VYPp4KEqOQiPdWfpMNtd3TkMJPPu6YCDKon2i

SvgoTyMEHFX8y++ejidFH1FE7xW3vtJzMJedvHDgjF2derabOzFpzixDra2GSRNwQvRDepaB4Z4DZ7Tc

UKWOVoEBaqzR3oA1V829zN5Xx63OdEnIgmbFYLD/7W
Xhd02cQkY65PnAD56r4HN8v7kBkVzemHcVO7qZzRsj75e/KWH8oNXb2Wj2C8obZP1N87r+Z8AktoPRyt

r9+0xozwZmQ5c4X66TTi0b6RjzRSh29Q38FtaSmfB0zmMp9dutQjf7ES/V8o243Zuj+4aUwRB6S4KxTu

KprXNI2bBmJ6bzhH87ivbEHgcdHvQWbngHwV9pPTD3
tuL98bSc3q2JjPODRn1rxqrKc7hK/yXlqfw3RftzzhNXW/kFykCMuRUm7KWUY8LDSYPtpa/0a2FYkPzC

Wcb8D1D8k9qORhn2XmQHPyyCZn+/oYHadn4Hub/lj38n8ISwrHrOu0LiNt5+9AZN2tx/YwtLobIx/C/6

LwhNdNWFJVYpD8sOhuhFcFR/B13NZs1crrfqCQzk/l
vHEwlDnfT6CcRgeYZAKxwB6NHyppXlSph46eCSWJloIBSZ8EorsUdVpXzzeWd0sKn9pF+9uHXV3sArdx

i23pmHHl175oSEBMTaD4p/n7cCq1435UtjgkQXM/O6sDdep2WWkr0DAc19My5JOReUDNgAvcAJ813+j6

QbzePG72u7S6JDQJJVqTsNlfO7TVTXFMcm5KNyOqqc
5Xi+7+VbftshjFlSsU7w/ArupRYM5Ms8xs3dmkrWo6++tMWqh4jYeLQ34elvpirwHvDxAgf9BgUTP+N9

ZoZtlE6AGCMiba7yomXk4nogSYNIshqADgvwZOJ9Ctn7SK/sctkIkTeCiOtSS6bBDEnvmWu2x5PSUg4W

+oUwjFEH3b2/ObFJJOgwa0fxzVH7AbnBcCYiNdVnhV
s6yaNnQUS9pcLTrUXzH7/3HX9DFuGvW2gX2N1VM79ZhklwWON0yvA8YGYfj0G/vvRxOaenQyTMDJqXIG

iEMOtqcmoW4NG3nMLpwsawilmbnh+Fqqaa7bINuhpAbtWZOgvnHFsLovKW7gFXcC21vPh28ApHJI5Ndo

IyPCozQu81SkZVy9pRSQPtHZLAk8m5WwtpINgsBTx3
82pJGt2FhTAOgQRIUARFxkPnSgmX7j9e/EqZkcNy4oX5SudKLn/gzrwbBsU8gZQu2ke5r4BHGd2Mzpy5

Hvwk/IO1y22D0KjomMoCqc1nRPgzwSyAKKMjZMw93jHEroIQW+ocPZeE3zAAcmRLd+ad3/B3P5KH/Zj6

T0UblDBog30XfRHz1sg//O+0xqYHdkBxb9m5EAj7If
hb98n8bDHQo0mqxq7UzRp6OeDoTknsyd4SNc+3ytnn9+8Hp7N+mOB8jM+LW1QYo+mAFxv4ItLJKUzkXh

cEXmT4wl59swkvQf9VpDiOQlS5mk502jHmLoymPYhN6OGA+8zHIFc2BGau8KLpnkI04AJY6o2Vp0Tft/

NiGqCL5Zib3QI/k7KgahZWX6zAB993ucz+5s8z9FLb
ISpBHq5EEC6eBp92AuHp7DHhVnHtanf1Fs9qtMMnvlQN4WGyzru5S8Rj2TnuDSVOq0Cveh9cH0ikjWAX

CkKdQxvRbSQ8lzuY3w6rkZVD5Ou+t7Kxq8+cX675HNRp4K0RCVg7ibHXX5rVqM/cLR02kjBrsshvL+0R

d+SC66Pbju97572dChKvsFM5eDHJBzwJ9kVrBOzZTo
FvokatoK2Bbv1skgHEBVhYqScJjLnZbiF3apKfLdKTx7EhJyOfthejBBOZU6lQYNown3W94BvCwrPPIg

lMGGuLnmaGw+3Wa8OtO8ysrZmF9fPzv+8Vfi7kw0yqrGNMJz5uJELT3qhFu9NvR7U9oOY1vTeYEavGas

AJwGqppOtroz5jHxZzvYTcjJ8JE7tcfqTAWqnUuZfO
FM+0CrBo0D29Zyu2as/t7HNECB4ZyB32CTQXHr9TsBIRwsRXIB+Nd3x3Q0YYbjck9B/EndmUB+xPRzbU

egHzsM+nP6Am+pElJKp5Sv1fFB7q1etkxFN7WNYzV5WC1W8kA93HJVCr+rK1eYG14KiGwy6CBPQ2+knc

EhD8cz6QLg5KYRC7R63T+9P18ezYEqemK/5LxVX6/G
D5bCUM8xD12+FUKLzMcpOdPlCmZhIWH0spbfDRgOYRbXh99Mv0GPM8MCoxEXZTgeUzgldngiMnuhsc1g

kO6s0q+yNlMMg6GrREGyfhtcP6nu9ZAnLK3b1MkyFyRtZ4ATtNtSOAixwF/YZ/3oWwUglN1yH1ZXgH+I

uffqRdkOZxwLKQ43hH6OonrA+GwRDXUXNwwN9w81wi
uo2uyCvzR/XgJgR//1cZ/UZFun5dZevWKy/K06ybF/Meng/2CQacS9dlj2kBsZsOO21OvDdoPU3uDGwXW4

pgUtjs/Tq4x0DuvyTEJQpJAQzkKkWFspg97IWAjariVOfHxp70T75p2dkDEdS68CgBKnls6GD3dROZxp

+qm1ORyTrcGvTEAYE/Guadalupe/nNIMh+7ftYMnBRwr2GxD
vBRx7Gvo1Xsv1MNiLAAVKkY5+uiCXhuJCmM/7ql+1ahHGWJvHz3X/oJaxcJKijrPP5lh+utvtYyHrrHG

mnVhzGmOTGq+cZn5BfINyVr4EV+N3QW8gLGhcDWut/rMpnCx/DRbjpoRRurkNnZ+GBn+T7Tjt3OVhBYg

1nqtPw13aGfVi/Ht8lw4H0g0PWQcxxoBFaksN2+lbp
3ZWSwop2s4Hg1JP4Bn+CP0O1RO6qR1EiOb1SwJAmdZou7wyYljL69pwfCgGg1PD939S0ShHHnNp7pILO

EICe7iWULXKZurWpZCtiZOZ8Glj/cmsNQpnUgKL681wLyimXTaK1Lw88oQzXnzlGCluKL2z5JFF7MRM2

+QnK5jegvPVcsbzDjsW3hUC07o8RA3aUD2FSAFECN1
87cpLb6hysmeKjwOnM8kF6aHuISRXa6CeUPPZnh20BIVTQGAvan3NM3dybdqP3MNksNOOCYtHA2Xes0+

y9tmHOfcGO0IpF+L+YC5m4cMfh3Z73E0Z6e3SMJ3PYXXkLktfLc7UlhNmAm7bUNexPwT+yiHa083hwyp

C8Z2FykVY+mvieevaoMV5r6uiXxUN/exQuukcxB80B
ew+YqjHZ33J/HU2dtqRRji6y/n6qNsgz8S9y5CkjCS9/+5ce9PjwiGTr2x2NxgZVG8HIRmqpxWzo6XFe

vvTv0JizGTmtfGG5Ue+XeMP2n7vkHHmD2bih9xVMTttRO6nmqujshjBc0X8PAcKpYujSCG4gkMSETBoN

MzlER0o2RhTbJAYnGqg7wnDQcLyYCPM+wW0Gerpb4t
IZP+y8nSoMAx+ghs4Vv+QZZcrDZKqyejiTKf/pXhL3JZX5OFOzr/mq04OKOFk132Y8iZDxkGrCOe8zRU

wqpor5VxIq5oAzLzgqjQSimT+OC489H4lF8c3QkfsjZ3kH2F0nZ1gHWDSz16V3lQXi0rsEsM+qXnGqe7

ieSj249uI/fgt46KFBlypggsETFaM2DtZ+Y68wJL5c
UQx9IWGj85pv9+pFkS6Y11yu1paigkJ19JXn4BfR4z/51QtRPIItpxG1cyd5rUV5W5e6KtAylNPmdIzH

kXc94pgKu6IiYRGC3qjvm5vaPUIl6lNRkXVtBY1UQkGB21LelxKAXS5vdqF5T6BW953TzJMv5kumSned

9dPmmXZ+QFNE9CmS8nOA5a/opMfPCeh3/EzoJY0HeV
qof6YL5h2Gdfhwl5KVq3pwIyrA+g6F8Z1PIkTnYYDolCYJrAQjpMcCNdYAbuL/gUIl66k29vLRV5bHsS

MHWtGlSiyY3ZWRWBrXYT3PthkEXHCdsOHc4mQlpD7gmN7mWLO1bgQWlGxoIVXa0vmOGDtdaWewJV3GXp

9iokaAR+nLmcsvq4/BAt5bZd6xEOhsjfTHe9RZiikn
naJ0D7qPSeTmm1kHH3QZ05GZzqGLtsDFE0JQU5pVCRPYjcIueyMm1Ju+1+1v64JLCGqxBF6uSYx9K51Y

LozQTvuM9rndgciDPFMIC1sE2jDvUNmBtGXParkJ3B8nDSULyLLwnPI174Mqrw4xA6J/S6ONFIUbLu9V

enobAttq7Rov9gqO09EKgrdbdubDcdTPD/u33KCbxX
W+lCnYPVMoUs0IRWTC411wKyyO4m3Qodpzxq9M5hz5XAq8r+M9exy28DvyaO12jMx5Z7BN6KRO+Dz7f6

LKc9On5AqLJ2XPAF8qQekhUjZgLQYuKeVxseCibQENtQoOPKh1ubMGJou+sKbSvkrewv+fHf60wGlAQ4

gVHvuQHDFTppBGUO3Oz76g39Z78YPWT+amzkn/MNI1
GMohK74v7Z8PdUT5HQXA6W5n6xPTzTn3x46iUR9o99ft3dpx9WAlJWZgTziHKuomuxKcU7JF7cvkwa1m

H7sDuZmzJYUzcyNq+korU76aneJoePlkXyCHLO1QYV6IR/dZfoQ9WYf7FYzT9Ga8vWNbD+Nl2ZQgBq1J

96VTIcGg7NRjat4I0yKCm/co3X3wPPO9QMNgBp4w/M
vRiuKqUInMzC6hwZJlNDEjxOK7hAIOOTNPcpCuM34chJMsiyobD8ajuaxf8YD4zxysH+/4XHO2+vDwa4

mZ3yB6b9k+sD9xhJxXH0OOVHcH3502p1oqrImnhzH0aydJ/9RLSONjc0DpyVTmTgF5gYs7C9EYD1FLLJ

KmlQZeDBPIMdt+HC7dXGUb8KlxfTmA4eyOB4HGGdn6
JdgmccYF6BptrOHwe8QAghR/cbVQ0tXsv6++/k/N6nosqGPcHV3WwQX4GsTwzhphE0Gufdx8EbA3Jq3N

1dFr3r1dUFh8ZcfhKJC1NMj+73i1N8PDagrRVpm/xx+RRW2KLaweZd4csZvzwOQLDN55UQi+fncQfmjT

gM42MqRcg4qmH/3zK1Ki6J3frtqU3ILYzlPLTbeRXG
Kwt16NaTCMTQoKWT8tfftqBLRS+Ulf2h6C7aHDMirH8qePGRLrgSZVViZIYUBEhnzAHth5BDRhks9L8n

Az7Ya/jylHj2ikIJ/Da6Ee5aELNevBk3Mtrph7v/tF7GYVXY1S8Y8jMnu3fdJtRGV1f2bC2yS3CS/LbN

zC8z31azv25gCLXMhPAE03/QrmM1ORcS5/uvdqj6kX
vZw5oKqnvHYJQFB91dkVJn8WFq2u1r8luDYaQKynGOhioJgvFP6ECmDe0h/RQKwFnb4Nmu162A5DvLgY

s+FQjZxzrcWOKMCylidXM2uOibbvwR5XSiOwN/td/x6PWFnzTApPvNH/156RA86x3m7pdre5gfh0Khln

5zAgeTkGIUSy0r9K1cLNxFB0DF6imTITWfRiONonBl
U1dinN6c6+JH/oncgQd6ZH9UySlbVboio5EI38cfx+TaRFBwokaq6z0Hrm4B82v1bT1+CZ20Zv9502XZ

6P9YPvTVC8eUoXDtkFYWh+0jmhRC8MnBUWPUpL6ocASepKVFqkrIizUcFmv8oFm8Z7b89dk3Hk9ou664

a4YK71NmiLxdhaI8A+BUwjfFgwJXwengQmPE0s7fpo
ihRI1bsTQ/0kA6uli15O3Dmt+6LIh2h/7kZdZEV9zRNnxmHkCeRDo0ZbN6BwBdAVyZ9tdR/5hw3IFAtv

DBTGh4WIBPa+nmrhZ3tv/PcfZd62UQqZdKxVoEskxTSJDBe/XTi/GDdjb97SxY0QAwzlglCcXqJvJ1av

wm8H3uA6k3Xjc6Oa9MjrQsQwvaC1h+iyj2wJ1EuheE
WeXDgHlY77nukqRUJVKoU4y6sPim3KwRRcxR+gYnPyTiEByx5jYw+3qEhxEoawzt1TzdyCeGgYffBqQG

AfTMWac78yg9uiM8YGCFQ6gni0M0AEg7dp15HNlTwjcu4OYaO8NMaebQgOv/fRQRuV/L9aplUZmQbhKZ

CvLEz49KEbSoOv/3k5tEMo9V+4mQdMo2kuF24VJU9X
k6hOSIF6lm7gVovRAerktpfeKIg7h/1QsoTzZhSwl5b/sTXfUe1yiGlNyrIkVMYIxx7ztT/MStB6MUTC

P4LG98OOY8PkmzhVBp3QHAoVf8TY2Oya73caAFCvxtR6vLkJcGl2R7jszFIJTCBdjOO5VJMfXCMQcDUx

31RXxLBYWGXpX3yyyN4JLvkXnHSLJTqpMB+3bjfX6O
RTw6IXk7z/Mok9pBlUrhc2xkUGUHxinSCk7W+2au0xQ0fBZltXdS8HJLEA6GExanTKATeFe/lpobzzp5

yHTknNsSaRvCewMJcBC3Rvuflw751cdIRlu9FqQISlv7kzMoGox9B2KO6TL10db6d6P5ASjDIfMFALcU

I/B33FK4z2xy+6cBMD3kHG7qG+3PZcVhl9A/5HGTP6
9vMQh7jPmyGIRvEPdV8vs84oqS3y/FAufzAp93IJra+5zN6RbKsTVn+/aEb1uRFmZgMdrge9EBd4fcaf

3XNGvZfqEV6lEr0oS9hYquo//KtU0b3VOUfas0WXU/cBihfpe9zh+TYSh8Kx9z38nlTw3CAYkVcVPBp7

tuo3eXG381UoiWmhZTBINsMA0wY11Th6hJOiyOR04K
63qVmgUqxfd0dso41rtRtrEYCymAIg5eXkKsa/eP1x0adro7mbmc2+BsHCdSEa8Un6K3GiSVhW7XJbo1

6W5j4x0cbpMVsDUU+6HMYGDN+O0Ix1d6ikYlbJkFPhL/kLtnhigldgyJprdDWuDGfIUhB5d94TvF5PZp

PHeMfQeNoJPrIkjchalfpPg3DtN7YFA2D/3C7xHT8l
P9PnnJ4QRMgcw77WSLjEAoR3dtS+LrG6db1kXjgcIKQtQBtJPhL535rICH6qjeX4wQfsllKy2wyS9bCO

z59GU850rR60YEYCdRZ6bVhUAomZ3AHyTR5LdKP5VT4LtnKsN66KzCVSjPW3zTiIKQBpBVfncNJoeEqt

xA8zdkXb+bf0zi/GtlOiYj1y2jbB+g+NPR8iTvcInB
ozyh0kGN+yM4nKnU/Nu8ZF0qjUiWLuWazVyD8DIHMlfuu5vB34x2Log3YwgMHV+HPaBrm45yVasgrkvg

l55s1XNL9SawFXNYMobe0Ns5QRAWHz75eA4IWLi9hez4LKBXN6ABBpiHt7N0hFUZv9ka4nlpmzhyd4hA

cyuCyrHmzgZQGgAyirLZuxfdAlLXaW2wDYKzwqrLi/
oUgA3Bo8NL+RaQgUaFI++Melziu86p7PMBe1Yma5Mp5k9HBdvJWOUt+2BgHWlW5oVuuts6DtbjQM0Wj3

SN8gZzD4ci65fKenmBqTECC8X0nKFtuJDG0ENVJBIo+6/mQdObXnnQdm2+4sOxPFy5Sb8jfBO2PSXDwv

R4p6SoGdV3J/4uhxgfpU1lNzrxvNP5z1f9TS+2sRV2
nVpHCY4heCThZoW7jCg0GpK+0r7c3rweiXOkv2G6rfP5qa90Go9Urg6qS9V8DeUnzDp9T3bYnXjticrr

GzYsO6aLcdQobPbrFrcospCYYAHqsy5z7BKXIyJu+AcZ8yyVOH+RRtL3gvAVe4eaFeLDUOmxMIRH/yz2

mZEVdVPEFt/1blaYFrbE/CntSQPkXkZhdJUCcm5GP+
fdYD13wtgbi6GKOjI8Wool8Cps1/0L83GLXkZ9OeIOQ+niTIKlVw8srBrz8VTbniPdwwLjVo+6cvLPhm

gPdSAscCyhZ8du+TgeGMXteM9g6LRvnlId7hQZUTYf4TQ+vjHmEQnZN5ul0AZSwilfo6N86oAxBEJUdk

EKfhDAeC3kkG99JT3Rlk/jcSAvQ8T1ifsHInflRk/P
vScKIuqIlgTJlbfHejbzQq2ZnuDKKc2um+ZiR7+kvgn8Mzeaue96QJY2bjDlrpXUEUGfw7nREXbfiEqK

pwqTuzf9r8ulpvcCzsgZlTxC9mWQ1KwqfRN5tBWV2tr7gWspBQ/NoIk4gmfo6O7sPnC/iQE1kNqkQ6EP

9itp/lMpclSJtfkFnN7Kln5evX2zkORUKdwLonjk9h
1BK5BhFprIGmpD17gUmiFFUOF0XWRBFGzkorw9Xqvu/wMYBBNRhSUZLRBd0PJ92CYKkvjeI2AnAcnEgp

mednJ7D2WslEhWsRfGYGd58gF+4Kp7zV/YLpUcnUuHT3HJ+jTtRB55+bBGNS9Wqs5Br4JnStbXPwNp8z

+Jv3TSPGTm2dWyhH2FYR70vpcwIF+1iziBPIuux956
SiTwrfX0kiiI5nNV61TExs2JW5Udfg9QawsejB9/sgE0ktMbro4dmed7BiLMPtvpVu8o6H/Pd8OUoHZN

FM51qyDgeXxQAxvIIDoSTM+Y19HphKItt9LPmavEloyY4aS/0XB5tuoxr1lFOZKcd8+/hruaXeMBq2gr

DAieaCkEgF2l7/+K85rfw8EbXtgfPfm8xLJEQjimrv
KfOowioRQqQ5YI8hl9lzX0GvVleufZJ9+AEqdph9mmw8WKbj/swECZyLy5zQ4QVlUHpVALlezjMmJnpo

YpzkDNAf/16B2Ak3P6C2q4ftw61GYCuLj2yPCrwPA38uoelDVrb0kUqmLKf2HHay2msTmzZDGt/6dIa1

N12+2n/tU9JjYM/NRF6yxOVaXlpml+pPLvwswFbBIt
sZahqpCxPJsCFLl1lIc4dKWXzmNSy7pPwfYwDHAUrGVWuVkC4RGL/Y8Ukuok/TNuXLo5eMQkIhXlssjm

NtdKgUyXr8TEGmmYnNb3FRiwW9HzPgUr61Um2NCpt8Fbx/lY+I4S3F+R+0IPh/W0Qsl8hT2ZnUIzskcV

fZ51QwYrhRtLHF3FVOwnTTWOnqrEThPBNB49vaHEkS
QWRZX7jlKRETsLMKyF/a+sJokfbvBk0qcg/cmsIZEDDJ87xunK9TBYgPe+TUC9YtthArx6nDtjpk2/8b

KABhLZC0uDorn7juyXP4ZQMmJp2XJCmBJaaKlcIDGj466XD4fhCPtJWfGueckGwX8XdRYF6zDLfpoySq

wMG89mw4IsimmFOwYWaeCxo884RpmbYMCfqMuJkkm6
1CgT4HIikeOyP6BphsPBqbKyujeAGEIN2aPhS+ZVBTImQ/FR/F0PaeL+qmk86Vvtfe38g61lInmetGYI

MLMGs95vGUj30c0/LT+OKSCURotVs79GOzYk7VIw5s9AlhvsnhuGIOIhhESnN3GGj5k9u2wllqo0hsoR

Woc2k1+5plM7ciP63LYtnfdwvumYbBLIUhfeXiyo0h
4ERsTRfeo8feRjTRAT/r7H20m+0E284A7WuYIB4fyvYjA4I1SYKWw2npruCGQRHZ3Xc4tM4lFtUc2dGN

waJrafa0bBEM1gEsUtQ9OvweyH9FrxeXF28V3D6mcRAJoiwmTBVXPKZAEs0mHvtXtGW628wOGbh9oynr

0iMFIDOMBYg96j5bCzdYGtwbAxKSEyZL5yRCX2e8ey
dhLV9H3GNb5UliQvFC38tzm20sTyoHo3sdQ/xo/0mNen5DHw5oAv0C72xYl7PfbuDhPoGHfndH18QJyC

hhCmTenP9sDEVflw8DfLnPcQJ+A5kUauLubnjkRwlazmHK4Ij+aseiXlDdH2OLXXHTxq7IaIrJYJsHgP

clDkScp0VcRjvLXylm4SxAOO4w43/EyngQsQi16TmR
DdgVQPu4ZbJFIQmd8ZyDOenyW1Ah89CnNYePMPjrGjLooD37oJAWpzb/00XAmI2yaEQf7pbjdDr8OMwr

VFYQ3EG9hJYvsqB+aaw+zXvNmlCs1wBXL1Frl/yQuvuvAq/piiVWbEHwUHAz148dhyOaSJZltBv3m2nT

xsMzT9YyOcow7dkxtN+8NBbZ3rcagLtTeM1fx0TOnK
7Rd/WFfLuQpqGQamtPO0hR3DQw/Hw8Nz1g2fI+wnM9dMuU3Ab+lRbklMX058rNrEAW46FlPnGp3zGUR4

3Ii+uXe5eJxs+zOd4hvVV+tm/6P7LrzXs/0zeJ8BQOoJ+mOtciSZWdZNDiQLxrCpZtz1b06hLnjjjyNa

M2al8dGYfKECFezcphWWCjMSNfroLxTAEK7ueAtrU2
U5KfYs10nSh41z1h5NZBxCRnNIA3mIb9OOHIuAWn6aDN7vvLnZNZGDvaOhHUAoOcfmDetnec9e6p/95T

ovExoXB1CHXfhv5lVZeOoDtg5qkARyK5RNWpm6ISS7ZngtLBibiCXmyfoaE6w09Nc/Rfkn3XHA/i9n9Y

JfDxhZBYm2eWMXYieHQVfnM4PBu+IwGef7xmTRgFty
PMUqlQnoj1q60CQJRm27hUUiBQuREgGO/rNOvynVEnPS+GoUnb5380rnTo+E/8J/5/SVKa2ekHRn7Uza

ktGp5Sp7ebwiAcCmr0MsCTL1sL0I7nEgmwCzAugTQ8Johm1gr2SuEYT8Zx2O5jilMA7zS5W49BJI8U8e

KGMMX/+mDDmFfmoOMGWS2459XqFlFWkH7ZVX52nIBp
t9L4dh6o0jmMDmGns3U/GUeLzR87KImBR7GaT8MwGT2lAj3Ds6WX9pK8rXzd56iWMiVAUid/TidE5ueX

MfnFJ6z17S/jose guadalupe/ax0uA9Af9VEl6XATTcnMq7Vwqiu+sVSkyhmDcHjZbrS8sdIQYExVEAPscV6xbVl9nP

TUz4rV5/xtcF6GahiA6BILfGcKBeHs8o7aQcHJyQkH
cuPzysIfCwgoRS76jYXLXpHVGhj82D7qVbJqAPnmDwUePUtwpuTc+EFUH2A5lF0l12YuUpzjR/Pj7cZr

8GxXW0H/jzDwY+AEtHvIXBJLhI4nwjkUlhUDrfdMgH83gPqHg2NWp6BzPiDv+25tWvbUZT4yAPL3/lr/

ib2sS0rtFRTGDBrMBQxdDwITRiYlsG8WBi/g54pDfi
+djztiru0gR3Foj5vD/ZBzFNAyhVk0zrLZVop5LYHfvEfOKxM/Xh8fYAOsgg/JVAjd6bR4uZlM+Lqe/q

OEJPxnbvZXP9B2cgMxGUAoWn5YTBRQ8aLOLOIkKjVY0/h41Ud2hdP+b7/fXiLee2FnuD7L0RmJ1ODg81

89XobqT6c/lo881GYm6SzZsPBwtr9i3V734etHz9fh
iInwPp6Hea9dhN+Aq2LIZF/aBoBy8DtVbCDwigSRQJMV+vjHONLaHH17o67xGdyXjDUMsdH/1vlVY4ws

e2p6UeyJOgWK+piivPWipm3Ssu599oXwyrGqWyfXU80P8HAbd/eWw4SCv11bhtSohio2NoO8r9f8/wKQ

fb2R1nkXimwRvfxMGqC8/TxGzcBYg5NLojnc4Gmi5c
rt7Sbzsp/tQl+zDGueYLbzCVtYAphSQ0kXS2A0wrJXufPphFyTyYq4AODswMjyZ1GFElcat9lG0I0xY/

DVmy8kOuw0Mg1EhPeZujMNu3u8wugvUais7U2L1Ya12d3nfx/CstDa4pNgUga96l52344Oax7dloh3yb

DT1ew1ErhZpiG0WMAAaaKkCVPKNTzEBpz7KHeMkZNt
ruUqECblkK7nOslEtukBVElJkN8zRW2cRAu3rEnRGCPV+QCxZ8zPTgdcVQ4lF/rgGOlhS+IRxc2ndCTL

wTQlnm7HOkdE0jPKpzK13tLFnlLEKymwAfq+f+GK5e/XlNFWkyjLI5QKW5TdM5sa7axUSX9U3xshbQPy

l09s/SblmsIA/cuj/v0njj40nPn2CXt9W9jkfeSJvn
R88byEWV4blc1Aa2EQx4XRhsbMm/IDePeXyWHtSvLaR17JrUDP7ms0FFmkAyfRgrSRWYUsfYnnxd9Vxy

CU8H7BMrsqEjd0pUiMaDrocdqDZOBmRor8xrO++g73pr0KqLqZtzsX5rlTnjwYEiccW84iixnnOUZhjk

AyM8WBTnWiBRjfrG+xzfIKmkX7sLZ8olIPuLyMdica
NwniB96wsY5YhBLGun733iNAO4nO62jxyre+PX6gSj5zqUJ5FZAs3oxdB6DmBCHpANDNaQeSjFwaW1BS

CT1oNtp1ZhOgLLchr+wLUimG3tRXJEhnzUMkT36mW+xsSikSKJbo3h7/j52Y837tMfszi3/t4lu/OEBq

9zsBZS/rcv4nslb6F64E/HJ75WbgQE2mYt9WGyPScH
prs9ue7WItD7gMxnSASF3NMgfvsK7QiIajdusJ99WEGwb2N0iZsxGQS5+Tq6UGY92JquMOfq8GAke8YZ

Vb41/HlTh4kkMr+2IrjWasnHmA8C8M02lpltdrTtAfK0bE6ik1wyg8cLM+WVs7nk7KpwOY9/6Y4lZle2

npHUNzQyhQYo3JzGZyxswDuCWxUUObJjAB7LT6zdQ8
g+irTrH/9QJA9pLHOfLkYZYidxAMscz0u4QpIFx4g2xnHcudonJkl647BWUOvQZtFch01QAhfh3733B+

Ztm4Of01BHZHWGv0zvP5XRJuXOBzTXuTsoWWLcZNY6aejEvka3coqE1t4vRU3UYMfu+gVYMABQ/1/0Kr

6QGQUj5rVF6Wy0jwCBMf2a1RWIFpqmrh958Kg1aX7u
YrXg5A4VJvf5uttvDdByQYWlDF7Tj4C64uYrDJMcFjtJRKq6pUgOUIuX3zv/LEBcqA3u3hr4YMUmw2bt

CbxVCjAY+04iRi3qGdw6/ZWA3dTFj+LDDMypOVCZee7bS7Ej30IsCC246XvrNmklZn42ncN+d5IjdYiw

lzS6msNUuq24Bkm+MxldyEjzbsjZ/am9WPPR0ndv0u
9MAj+oYprm5zP1cgDCnFqW9071pE/6CEObNmDdHrWaNcg6hPUk9xFDNxImZBIfdO5ZQTgw2qZbano48O

KYzBNhYiysfdgxvxuq9BVw9NIF8eVPmD5Gj8OokZJO/cIJqg80HxYFudhC1NKiR+avog4x0lXRfFc7i4

6b2ZXlmmv6LtT7kFo3nJAK/E55y29BFbKD7+EDK/s4
WhV6dX2c2UBRzQCZkIXZlkMnsQH4IMLG+7JJAtEQ2grEkx6c0nvyg+Zymtd6wZ7e/RWQ9tIno416Mvo+

ioRuJe66JjRfsBGs+TbHVkF7jWs93DwqWLNw7/SUAP/Z/Kciu/5blKdcBSHkskbX28NjhfHcpkSFxJkn

SJVoO60ZyCNaYJKUV39bT2Meg0UDAW2oqkqOTa1aP2
m8+FplxguGkUapwBzuJak9Po4eX83u2sQLxtF+EniMsprL8YBM1/G/HBvv/s/HdbJcoiuDWqc5H0FhUq

54DwPbs42Aih8jIsrP4EetGy+99cOM0gmoLfpSRtdyjo0XH1P1LzTqSvkYkEVZ3C9L7qfWjYwKzxdV89

T/YNBYB7AYvMpaR2Gv6j+s23IZeCUXi+bMRSTM4FmD
LNgo1IDHJo/yOmvfGUVddYWTZKkrHQcY1rNfHlW0kNWzMtiBEUpc0Z9/muodmrt0ImA9/XlH3SzItH4a

u5+3+ul2r+C1YFDJniWpr6jQFRgDBQFBxDfiZ2/mlNwXrnnpKE45yzO2qxemnrvnu4lg1a9bhe6MTr5A

fbNu7nMwzYXV2lyMhp9g0MvCATf1IkoCM3RjZV2pro
9gujqbuPwZ7/hcMsZ/0+PAlOg/LZ67rXM4obC1wTmeK3AVZ/++OSIfrRea/L7q5fOeGtYK1hz6vgw+yZ

iAkFzfdHKsx4JL/v0DnESrwlCQYnACc7eMOg6VMkpZ9htevpe/4gKnGW7TO+6ItTC2FDobdqnnERYXQg

Gq8Tgx1BWXB/Jkl/gpKBsqdEgyF0/tEHJb5tg0EoEM
Iza9U3uXlJjIz5JFkvLe3BuSzSpnEkIKglu4W0dc/YFopa0zK3shdctkzuvirG5JrR0ixhgLyxkq00IX

CokCz0/Ix65lKR9nAQ+3Eidhb8fam1FuYqd3iLsSn7IwVTX1Nu5A/pYD/BML9QcSWRVgrSXcXTv9mrI3

NKWGjxlsp+6/UPm1OIEhsNOdM5nF/8x//Y03qH36NQ
lez9JgUpshnKHQXNVYoW8CWpPl+p4Po1hIgIjzWFc+1urJyFbOeeUmKh/a4sbueswOPK5+EHCb1yhoDY

sMdm+EJimcdFvNZ6mL47c1nsWJ13POBXeHiThqFsphJNLxx4xCtsLOrlunAxTIs0UfPPdnX3yhxVpgDQ

hoCJ5Xvd9AFyVqvFGtNOGZL4GZTCy5RL16IwCCnhkg
bUpZCUGb+tnkQq2ocY6LqPr1n0oofccipuWsl45sCTISwVYpWab47zJXAaw4PoMBOjvQ0+ucMecGs7/f

3vbQFOl2NeZAWd7T1ZoPkgB1QgOiPk2fKIMc4nQ4m7k+F1A10n2/w1Y2tqT6r5FA5dBqIRqNPnHp0seI

9zAfTkmdHVJc7BMgVE/nAeCjOgojxJi+Es5gS4Pe74
lskJmlQjX11exzISUlGt8q4n/lEccifqkfWjEFX0jxOSbk4IJQXr0cSU6mZ5Ljk7UaWYa/dT7uCXl12W

ZcUInOO844HFuSl6hMWrVXUKTLmOu/buEc+uZffVwO4ouJmSkNfwGWC1o6ggPGVIzqX+JMZXDlnkScQb

t6dv1aHJJknZ2bB5XBQ8Fo9W5CEGA59+ZaX18NY7FZ
jJCh0iEnT0RDZSBpSdD6Q4I8pqg021Kyb4oKVwl0/SaYnfXOpF0tET+IFaS1/qd13f/D7equGBfIblZL

nzd5QS9TNoD7qJ8J87dxwRJK3GjS3+U8UlT/Nme0z+Hes7F4LMCFsYRqJfigf7MRMA7R4gZICKVgRtsn

sCki4mQx1dQeokN6cAVJZ2QVQLIt2ewNO3XNTRF3dv
wCEt3Q1LDip6YHJQxzQgapX/INE95EsePuQYinOhg0PjMmh2XD+Qt88Ens0irwaPSdB+DNGVKsfT//hM

u1jMugpTm90Yrx/JrZLJ86FZhIAb/hPAkeOIvDH47YwDt/qu6NTrU7ZkqYr+FwHLjs6Ke9LsZOZcjoKq

jb7czslxudcJ9UEjUoCMk8cCeHyHYFE5vjDbL6ItEb
ICwfqZnmPUY7dWxqIoS8JAycyfsH2nwHohX47EExlNcZCOcCiWydQP6yVFbkWQhfGoFrA3z2ULug7FR5

yd06pF6Z9qv8fUqCu2IkL0H0nTY7fqGlXjMWta94aAmMJSUT1taaF6J70mda1o5p3j4u/kAo8H7f/YVb

p8k000LAXMasGYfvdGFnipeuWtRlYjBAbwC1tgYz2y
vWrND1tpZqpV1lbCeCYv6L6hcvJhhWpbE4PQvtO7X0LkXXtOYz8wkeuBfb5mbOMxR+sy1t54mAXP5myB

roUAG7PlbMjG5oeZOzEoBUQKYVPDa8LdWko2BbwZfVPEDzBb4GR8Uro/kDOdZmitTOCI/IUl+2k4Cg8E

PxrsdebpPcmDK2wRu6CbVq/LhZow18WF6yPxp3SJdR
gIgpUl3JzX4bgUnh5JrmeILwOeeWjhCeskhFjUPj7KUhPVv/oiQq56UgKqEFUZHx/Vv1QUnCTFc6trtZ

R0j6e3SHWIw9pDCGsDon7Tb0nYh+NPLTRv8ugxpSyxrc77K4M2wrgzGBzM9rz94rWnLb11khyhrYiw9f

RAMW9N+jsbst3Jq2RaKr1J4TVF7ft28Z+HAkp5GO/v
iQwffeRAoVoVS1WrXrEQDX05r9HdfnBiKmebP/Q9fo0VGzaj/t4ir8+4XNKMq4ry4HtKBcuc4ZStux/n

ouL3BY0/im9iGHVjr4O7ao55x0LQE+kr0qXYJqSuSgLqbcXAkANizk68vNz7lQvIfBJtYMbWu0IvOU5p

4+nH+3L4yHTOF1LKpbHDModTpMY3bjIE2L1LgDxcWL
CySHlVa7k/amRIPqGlWSJ2DPkCH0flfu/rtRfEhNZ0GwtqnDhRFcPg+T5bKHMWMQPCScrKU01J3Xje5k

Guu4OXhcD46HQhYZ5TfZgCSLlbQ22DJXbk5xWVb43JUmuvX/CAROL/o6vJU/Lk1KXhNAMTpYKyquAx8RuB

1BmUsbuL46buu6X8y/faAysR5KGDjRhqiMGWlOu1HO
wgz1vfJp9jIH71CXi0LMBV565yNa5Ho+T2GVuPDmoAUOeIQhG/2Z5QG3pMATw30m7qahzbkDsPKnIBwY

Sm2nOSU6KOVKOTxKumBrRH7dR1NDxE5607jGv6yuJFtqYMUgLVKlQ3QP1oWzfFauDhoo3R4Xpi4i73mp

Jnf3Pa3JPCFMq4BmSvBJAiFdYWX1Efw1kdFc9Q+kzi
FVHZxAa/HxuxXZ1fYTCVkLIvxLXHWoIT/zC3y0wP+GPNfBkvtXK92HACQ0D8DLyqaKMYsFTL3XGm3cq4

FjAn++1qOk69cmzaRaLKqL2HJLofwRdhYBttjBHz7h/iNTI5v21gr1Bq8UMrtS7KJb/UWX/u6pdFXUH+

xB0ASJI3hKdNmSnazI58RqyRDHrTVIReBmSNqRfXe3
7RIJjtPs4uEhbNfNuRk2+zeLGTdhAIcbEHeOjqyP1XERBeNmYVzh+TtoDHOA3dREYGEqi+vYaNCdXp7R

fNkR79kkgoVH0EDdFxpPb5NBkTT0/UeduF8kIcejCHreBdN63DnJgLalUZ1AN7wKoLy1QvM6Jyl9Oyuw

pkBp0KyCzdD2eudQRt85uSxS7EHeIUCpvZ9OFMS+dr
YFfu5biHJj3lIYjNU9eiXbAjChYUWQJCISKwyzo56lcKdlweV1Q6medf5+j6RWauiXrF13FIHrrZqzFt

qxseKQlmPt5R58ePOQc8WmMGUW7PDERQ06+yAFNbGPFtDEluNnaP+Vm9T7migtjHBff1nHVsVDF4Qfbe

Nemwz/A4/uJQV0BqVDHPU/x967OtO7ugV8N8v0bQtP
oHlm0MgM31kg59P6Huy95++HnDCTreDaRb5ISgclgnFs1BwRebYWljIzuz9Pyl/nNDZAI1TOBETolfr9

/7eMlEU8QBz8Pg9D4PxbjyzSPjO8jGrmBkY5TXsuUQ8n1l2m4d/2y0idCkfk2tr3D46iflI+3a1EgyHw

MgPdQdotNdodI/kuo4kEpp3LRreS1jmTOK0ksDR/Q6
sPeexwd7icspJMUeui/aHCzT5a1MnLKVE6fgSL+HTaIAkD927NTNJhuNXGMEkQoRdriTs9JHc0Io6mYD

cPHYRVU1Brz+bgrQ8A6YdUyjTfuvP10IiflMVl+VyttBIeKwMwm53Gvs/m52A91Gl57swpdZacIUnE/W

xYr8FpeoKccvpcuV384kv+DStotMc2mnxFPQuCirEi
KXW8uhw+5pVyJCOhGXibXVusP3+4L3wEXll9uBHW/qlRvKcTI97gJ6O+zS/nfc2A3sHbsaf9XO5HikIt

Tz+HXEsGx06f6WqFy8ExUQosQgeefxIWkyY72/eOwNGrT9iqE2xTlP1sudglz+GMJR8TXeZ2taog25a7

krBxoJYS4EtdDJs6HzsLzYL2GunpgUzm9CFxbibAPw
XV43iZ1by2bnm01Ln0ZRLtCr1IlaK3tIPjeA6asTlCNHljMEk9JpzmZddXipcEK91vMeJ3CBxE/vwB7X

a9u7Q+jdefPr3BKI/u6/PYvoMohSaFnhtWCJ8Oi1Hvd+pl50fmrR/E6OniZyodI1kIc+qPK/y+ce6Zd8

sWnQA1qwjVCjW67vz2B6h9B0x+LK45LK7NlN13G7KX
Gq53ccagbUOP2OpbelRoP2ddeSj7hWnOoqzYJD466ibps/muYEFxr2Cm69VAjtDAowJlzaCnVAGBb9A2

3sQxJ0oQSMBasqk+ehPDdo5996NUZtZIfhLV5rC4OrxBwxS2K+Ohs0luja86MoMBu6zXYcAuaraCMFIm

lY9dnpIp5tGbHPvkTxUAT3CNQ1cfILE6PwqEYDhXc0
OG1OdJFfbat8+FMYsrGXyHtVwf4gFmEd967a0N+N/8b/qCc3PTkbb/61yUQeR6TA/2hIalWW7jdsiyZ4

KF3EX8F3zL7IiwuR2LZAL4aEBhFxYuBrlW05XL3i4NCcT2pCA1zRnuCNAqfagi7hkmhNTQn12XS2oIUV

/TCocRtT1zOj6dk+kZUsaawcf6tdNaKd11SuLh+1LT
GBge0lnFaeOQrj+4i3WcV5UmRJCkwslHfZs/Pj6YN8i2ocAIF4JPGA5njkOkIyhbmIaTJgAKcCn6ruhb

pym9Ij0efbFaW8Ok370b8CSpjFlUQGplj7lBEtInYFTS2KINOxM/KaWyYHCepRrnAReuofr6LYiinVs9

KGZnd1bal7lfeRgut00/22sR/hkN+qZgMujpwjt9Ve
/UD/iDmfJDcF+k6xtwlKhWkFy1xYzyaEQrWAjhgnQ80GFPjYv65z4WbXdF2k83jV8mp16hzqiDQ3C44g

WCjMUIfj0QmgkJK3yEPo/dET/pLOVvTqm9K4Ox2ESip1LoTwRsqbGzISfuIgVQPt7c3pnAIE8EiTIg7A

wneVhhzAU7v9nn5hBh/hLqb9UW2K6tO3OaGE8sv6vM
ap7fv7gAc14jTuBykpLc0kNEuOG2V5Q4AUMGGu7qNZqeI96+AqBvHDDWSPKyPr7hlHKSesxavdk6obnX

MPUgFpZnyMvF/yD0E9ozSKKy3QRxKuKHnZvszXqy6Be7WaisAEyWKx4stCVyNci7briViIwncPeBnHrM

gvvrZDuHqVTHw1NYa3Z5TctO2IoCb3kdPq20hRuHbr
xB/tQNFZ2tSNm/frzh9jGb4cK3KGfs845w0cvn0yLhqVGZjNWzq7YpTO7Mw54ZZZcYwL1tdUCnQoAWYN

Ayb1ZsdQXdkxMhATm8LS9XMddZUdrPaDupBuZayc7LU6fK/fGYv4SEvyuyhfxrjgceJO58MgREgCQwg8

UO0Le6Sibel2CexUi9BpN2m/7creEY/PG658T7a18N
mRNV4pWti/8f9WcDso70ETZyY1KM3Hj+Ef40ZIzqx9p6+sjWixGvBtTm55mW215EHTtJBp4G527nOEbh

8bhU07Yfgqpq2BWU3X9RCsylYMkxk4G1AHdZVasOmRNBPjrJ9SnPFxKVR5oBfRD7BZIAcBfVAw+zHOl/

zwfXvBKI+CEJtTVYqZfwfZHF5cB10LwITLvAmqV6rE
bzP/+cqaMnPWJt76HXGdbF94QVHTjRoCZ9ep6z/Jtf6jaxuKmONGS0193nb1y7Jndf85m4aL+VRSHttp

Ht+FpqX9DYwO4jHGb33sICLqnPnr17SJECwGnjNz5lwBS2Jp3ltPTA1EmlsgMt5n4v8CuvePZgAw+2/V

RjCKWNHxlIWxnztyIGXXLaAizNE4gC6qU/Hvoj+LEV
Dlb/TE3uEwku9b7PtIpvY2aqdabZ8GiwU5alsux6T//Pe4nqomU3+5mCx7m8AHvQ5OBMtr9iYzX6dF9G

/b+8wjwKqnC2enKHu25UYF6SZbyumpeJzADHuLKcsi4iAxyV3pxNVhIXaMxflCG1ltMlPBp1hc3rXmEd

GzEbXtyIYkZanRYiSrCZ2KBc43VfT2wr/m9sjGMBJc
L2tU21Iv1rnFN5FofyRcQBHz0XKd4BiYev4wyIpLXHTorZZKqdoL1yQ3PcylfNH8RnTtrHhSheo5m5ba

haWf8gvGUm/ThbOueC8qTyQxM6wVBwbVxsVcZRpH1nOgF0UCcFivcovaLDovrM9RLTBGDVO3Lo+M6WY/

ObqoASO70417NqSruuErZQahXBnORgqoYuy6sQcRGA
9QGv/3TIv0A6C32s2+DHWxmKAbyykkFbSK7pkyyWgbRT+SEFtL3kAJSP44Odoz4zEHLGab+s8ZMZhQEc

nn3fU16G5HZreHtPMfqpyQz/1nJlr6r67EfZdPKD/oZfP994glXXpiJm+vHvffO9Cj2LXAo/If6HfyMs

szejS39LFej9/LWtzVPqVWnplaLvYkXYuYwKshErJI
yfTW98OybahLtYecxDjbskWmf5Grvwroa3Le3aYyn/z9zGlIqu3MHFkGTGi6fGiiJZK/YWLvXqaNDBHL

EYeMjsJbhCOpVlKPMqynfYr1qtKkxWML92MPCYjanFLcboZmO1YoLKvGtXUyiUJM4z5+riVfDzOrPkQ9

ocd/5MdwCW/IMcT52FoJo1wSuXbagFA7e8/G0eKt6u
1vVz5t7UuKP4cIK2W0yPdOccbEAjJkvNa4khoIhnSy77Db6fUYOyXAKVLDFYyLeZreQgv6H3KFkiqrOH

7+H0l+Zro3php5lP9gmpfdFu4l6ZmK52MMIuxP3/UMVP5W18UshehJte7mMrSbD/NWxA8VSadmVxcHPY

lOrqsfL0+DbSFR7whrNxpbO0ZKFPOArYF7a21ZIrC4
adiMDS6SyoLqs7zRckP8PQwloeKagH86wYHBLV59zCoQTo2S2SW1F54deKULe8Jgs3qmZb374ta2HGij

0OAEwufJcRsiIZ97IWxbjJNrMO7M0dFyNJx+P67y3D1wfpJcNAE83/qPdduy2PUulrQIbKa0vjjywm5x

VGsCKwcoGqc6ubSuk/EGblxXyoH2DHxDXlPBY2P3UE
uhxSkw/QC1yRl+0PSWMNj70NUPeeEd9YsAUps6Rf8UI9SlNrP0mNnH0G99YaoEp8N/NM1u6JBgchGay/

k9sKHcb9tuPSHrzzJNn5xzatHVO0oig84vRkByaIznsZWR7ZA2r6vIF7nyOvg0+kEQYt6GMwYoFckIiS

I30S/1mASH/Iqw8eb4MIDTj1z3XSOtXswTon0/ca3t
L8LKtqo3yBTUUctIspW/x9Wi8Dh5ocQnQmbRdNg8iqHOe1vPz3+Mrcm9O14F1+Hk9cTfodt5aGTSZg7z

PLRYwjnoGc/TcnXudTMG0+QIBkhy5MF8HZEbm+DKQvVx5Jz9KOAy5JfoBlM7idzDfXWIUK+enhwZbxB9

FrGMlxGed+rP3A9vrp7uf3tFsdvMi1LVfTE2r1a25c
nGUFmybNY0czrcOAJVskhS/zS5T50D6B6P4+h05kJRxMtz8E73COF8c2c0CAl6mrkEvUHd127GHlGtx5

7MlEgvJVrgo1fXDRbWRyPf0fx5gAiJCJl0Nwd8MXs726k07EPBkHxlqUdZTbFHNxolS3o32vzDVwd4RZ

r/JvNSuzknIPnePxJDR703hVUiKPBedypp1qq4yaWN
gYUyPZ+Ophu0gBY4ljjaqF5UhaNNbHe0b5Qi49t+Rx63AioEpKSFTTp/84/tObThwA+AW59Ce5hZ1rLF

W7bBrOCWfQu/mC7qGVCJZudFXyRvYKZvUs22I9C1Wi8IQ1BkY/blrzWVqEa6QYLaeZLgxu6i0O2SYH1x

PJDQqPd2+KOLjsEWjdFvRn9oU3NRLqutRTQL+6Wf7s
Lxs8oRgx/5bQE8LyQP5e7fJr580X+krHGpyBOykQXaYt8/Oy8H15d+dxSWdrNuyHyNxT/6+lbpRhwD+G

XrtywnBsrOIfsLnM4kvHtjfbyaVOvr/XNA27E755BkEdTkhbZn9VnwFmrThmDkQFOEm5JzQFdIWmfIiJ

tvnBK2hwWW3QAzu7jy6Crw3TdZ7aN+y4LUODh006P2
QY7+gvzRWv5a5Kajh0ZoxYtWzGM3AIT23NWrq+cllmm75Nf2lrTFP3qlX38uPM4oGA84Dy2tXukbZfqy

vKMEw5EPIvZOFqfFvsQCShfRfo4s0PBiwlSrESvPraTt3zhNr+bGbJHzjFEYRqBLcy7yUCDTq/1poXLQ

3yjNPuWfw+P55nAvJpFa302mbEttZwW19J0y5lKu7x
t3TRXdoiO1wkzT4SzlMZ3My646sLYHzb4WoCHhsl2sP9XWNiLLfAj+Zl8SThM0COZBYvzewWKc4Rt5O1

1JxLk+Eylr4XSOUryKUTAhdCREEjDn2Tnk+hnzvpKxbiGMGSvVaiQMf5qSAplltTuHCDBH/QLGONtUhl

cwvjnFzRu3VfJK+NkjCF+Jk+F8fr/g1PeIgaepKnMu
iNbYd01D+MndBVomb4etl56d5i5+4sIyM4WUqXOs/TydxKt45UpXDCRUXWuv7CMykaqSfk/Kjl5nfXe4

cKUrFJCJZytGSanycGdsXCdZGxj7XpJIdKjB4XZlF5q7bIf1ZWgDiLGsh/E+4tmp++tbl88Y/23XQBOM

bdlaP/gDMpqhSmBCbbtBP2Q1NiGyY3cwedisjiKUgY
7MI3TGQn+hv8zEUKkJSvlv4e/CTz+kUsxIpLgiHiTsQnd3KWAIZ8a+ROYRB15z/i3AnYzxPuix5UmI5x

6XDHa60P3cnMXGa66zmChLUSpzxlyn7rY5AemVp1a5K57V+MFCSVQAVk0XV0cKvQP7pYaHn8EL13ljw9

1rwF1JLHY71NcY9u2uKkQAbLZ2IslYCwj9ckBMWIF8
kKezcbf9TeLTgpQenA5CX1anToFpd9lFlEieYO/9IiE8PYte7jPOe51D1RzEVYrNLJzE89mIU9auZ9MD

nHKMi3mDA1ClyTe90HwO0drpYB09RYQxCK+kx+9+ro+7+vGWt5P+OZt31/37kC423FxxEpfxVDaJxrpW

u1icJ7xfgJRB8UZW3u8iaXbHcdzzGzb28tF9MBQphM
X5GSleh364iU//l8BFtJEPavpfauGPNvFY241SL9qi7qPQtXi+Q0EStMv3iFUa/CKiqsjF3pwcF8GU37

po8+PMBfXqS70n0A6bb5DjzVKIEjGHEjP8oPglzeExqtmtqDdF6rWPXoWz2DKxD3NM0A1uV3DGsfPNEF

vR2x93L9UwR5vWyCxrLjuJHtu7LZEIT+5EvoMdhSKB
51OwC0gEAByr3DpK9o6coXhbP+ufje8BgP1xjjLJ1Wx845b3XwouqC4nhS1l0eMOtVEB+AFQPT8i71+Q

QvqB5aQJwruQYsGpyGv2LSoHmjyhVY13YXwo3Q+8mfe6cLpVIj4z4w3q9nEt/jPrUyyexGkgHPdP9pDD

Dz2bS/9bSqBOURgy7Y/hjpK48MM3S5uC2CBCsNS+le
MYnOAc83GaLVp/j3ONHXM/AO4EVqph+L3/mhnjXuwkMJK1GkNBsDke+iMoz28GEU6ztp51gTNYVu5rdL

WT8STg7sC10P87hXS4yUW4fjkelNr8ZbYD4bpgFPKGLMfKfIDN5PpQLSNGqtUegXLnmLkh8nQ55UNIPp

YwGtykfKawcaRWx5vID7vDqGjXilG+CcwzAP9j5DYq
/bsfCVVekFa0VRuCB+5THEBckIKiu1b/2yFJxazfU7gHtvPgkaQUo+w5WVa+Y7g48BwlwFbTRPqkdgWa

ljcycF/YMdpeGJxPmuxXRWIqyoR/HTcMnY9BfTvqnUsXHhUYbRV4JgN9MjVL+JuZkiOggkz194kIoKQT

uJAytoiwXcXxZZeFnDwn4yaaPgp64flRlK0SzNdgrw
4g2FZUNe9mr5Y1GJyOOxWt84jUMD7HLE4U1u0FWGeW/V4Mx7QVchsVtNBTM1RBPCSS8tx3doQRiM/gJ7

Nlyyt0IOF/fXSFHd5shrCbQ00dnwYqOJF8JfvyDIt6jhdqlZb0yw/aH4NDtOF+1U1+HFMhqmI3k1rogZ

07gs14GcTDhOyACwcaPWqEm3i/zuZsBS4kFcTvbcbD
4tlnX0syOsVCyux42P+UVd1mbGP1DTtlMoRU7Y/pAYiw9bMo5pHbS6VlgUABKxCAeGB/HkfJ61nUcRTp

uqYMJprbhK4ZiHgs6bWsd2bfoE1azg+SO2ffW+b8NCu3ajkHh1ahwOuK38wQdoani4aZlhH8MKu0S8Cb

rjhRi7UtdedJzzNswBxoutKof6ltOQ8boQHmpEuZ+w
HdWKu7xTMv4vkTgmLBLrwVFPB1k5kkTkd+3A+s1EQKdGX8TZkfh05hJMX7XNcTFTnmWcj1/e5Yu5+Be6

zuz/4q/SOqmIpYV4V79M92oxnSG2UlFpZ9M8Gi8LRTDuwWSopW+ThQftefeIA4+ie2c5L9nHNjnqUVwP

OVnUzmAx0KESW7OmEA9ns0VAoz+v4yE0qlUDnxY9H0
3G7AoPz9zyWv7CRoy3v3i8VZrY/UOgA7biBdRxc00pxcbA9aR+R63oSMbOhP4QeTjwH4dmY20fms+vHM

QPhkvNtnc3J8DT/3l9hj2ZCDX0AOK+y3Lst8wjz4JX/3koYTLkY5dsb/+DQnkezuSlwdivDSaY4qQTIA

8p5+NiGjP17WjyaV5xoN/Y8mWAM0RbwZsZao96bsEg
9huxeLOddEJoT3DCaIbyxZkwpgKAsnKT90vdCbWP/tOoNq+QlYP60GD8UceM2Vhdad4sIdb/DeIGbUZc

G9IpXyhMbZ8gVgX7aSdZ/mKupcBFAuBNYQrGdXCcZoSKn9zla/18Ie4svBX+y6/ymQVBe5qoXdKlGHwk

9H2/H+MG1hQHSt5arTyO0IyCoPLM1OAImhHmyOIivw
sFBCG2HCN/OT/9Uu/QgQDDXYqMSaCGOW1T/7pGvhrnKABkMGL8pjnuVjJB8uYrcBrrSEvxlD+I6UtAl4

bTmVfEIiizGuRwwf6zRVaP+QDa7zui4jeGTVuPIsgl7MzJ0UyFAkIsFMGVM3Z1HTIIk7zkCBZ38tJ51H

FTmz0zfWi5gCs8kGjNdFK24NXmM4Lb9IXJKMW/sykB
u1TQMlmt/kvtDNp70RgzJjkbjMDZh88IOGxFMqAsj9NrICUjzOVx2XU1IlQgmQ5x0TVHS8WL7RODQTzD

DqmsalodVNr/L2uu80CgZ/QB9TGWRw/Mbn0fOviwgN9T20PIU1MDFj2x2iW0WE0RoWlOpt73JN1FmGNL

x3LneeithYiExATJmJgaYQByr/9rzp3XB06rqGAw+G
kCgw4aVhBBlo6Ymw6gQU3Fbj64wNybtkTOjDzDI5fz9u2R8DTCW9/JETWVIoBOfrYyzhpuowQqdr/bv7

u0MSmvnl0UY7XonSKY1/nqRkGTgpMp9klxhqVYDPV9kToGtOi1LZ1OHO/M/G+dGwqYfM9qOu+Y1Vqt5C

j7QoDM2EhEBtVRbgj7UBW+f5R+Wx/31aJjjkTT7yZM
jXsoROXgJDw+haY0JNvkMgnf50RigbOzbFwgDUgp2TYDDwRXCrKohNhv4kvmiaI9O/4XFs789UW2GweT

O5oTWQhcu/jLhyEMGINzBW+I/2khi8rKg3Qd93ug8E+1sO7T2/PNXleJ01QiaMSdrmjYIRTKY0lAkNdX

lK6LjwlIREYiKXSSPU3V8GZWlWLstl7NLYeojznAlc
J6w8ZZp9CJz1/sBdduNXWwkyknbjR9gOc9p1+fe05HW3uby04UDvNp5y/2LHf9t2TSwwV6yZvjMb3Q9W

xNbur0N+2e5jACnoLhBqI8STMF5CWRvkPrkrb6CKITaTWiXYJPU6gXR5+1xK9qkd+dERjo+eE//tRYIg

Hy60ThoBqD9xnB0KmsYS7o8FOfniPvbrE/1pW39roV
WfT6F9TZVNJHlOYcjXAO8CjyoxEjT0005jdjEEFBJBuSaUCapXRmMkS3un9Q1CP/Cm2Gc+oKQv/iR6Cd

wau1pNglW1se1ei9qCBRtuywm9AQOQ1aOmfG6fx5CvhQ/qJ0VIwTpzS5rcY7vZS3ZIC9FOimqnyFG1or

bBgyv5iry2cfBZFKUVHKhEUdAsBjRwKgZw7JLW31gl
Xla8duZyNNYiLO5e3tkbWIsxTay6w8gjJSpsrmXe28dxDolxL+sac6coF2rEtrcs4G11okfu0VpU1vel

m7T9WZZmSsW9ERrbPaXedjYu0HG4xsMl4SjgsERHmol8oyx1vSC/huK58Aw5IbYRlRAr3kl/s03j6eSk

b0c0Aak6Bc9WHc9YfBXN4ZNZeNlrj5WFhLU91pQDK/
C/f01cyJhhjvf4nwYpjOs6bExWupSz2iD1wvn2u/C1Q0IRklevCedJESmKIPUk7cOeQ2PREGhSTEqvi5

PMIS+fkNgrBk3n76EqoufOR5SfWtUUMmA7uGtmAKoCSNQ94oIU/51yuJm3sn7jYd9RvrScgBNS8Dgn47

25CRIMoWwzBk7AJNV2P1Bbra9gIofZ+/oQT1jf9e3+
WozLmSX5Ron8/ajAfrD898eM00dzpQsYs1xwqJkAC2UlyX3nzB7rfS0s4LLVJOTADw421iimpORKh/rl

IdHhYXZLlzNtxRJLqhKz1fnOWC4fv1M7ExVV74KQdWW2TdMSFuZJ0fm9IYhfcDI5CfH8CNde3fz89bY2

KJOKTw3KrTJ8TdyZtAeGfmRctOa0C1Tg5gp9xvCB8I
GvDKp4V1yONtci6TzE6IlNO2iA8qMfDtb87cuAE/mTVpA8FCh+EAEJpNzRZtjqY1MfgNZWbqjw+bMJdn

OoeRw6u6yqvQ3XchYVALGanXcRJTxqKlrrnB8b79Tle4FSW1Pf+2h8NWCyvKGTjMrqr17JFDaIfkw5KY

y8g4Gyj5KMkytZG763dpL5sNJuV3juwha7IE/3NIF9
+vCP/MOaXNo/yjPGGljCfRN84X3wN1BmfIW1EB+DaN6hp1PRyugOe0wWRbDm2rkwxFA8Uz8CBxJN53XW

A8IYqviWg5SjATqP0Tmi2NBHeXzd30PxNFiuGqIBF8oUPThLlVjVk/qXRTo1gQS5qgo3LD+WftifXSVd

tHnhLjvWqTPkJiYtmJ4mdzgAWOkBAPkypvTxcwZLzY
iWb0fupZ6oKhdUeDF2/XGlUwjcQ0RbkiGH4nkUWg3hNkZq7MyQ7Sp18hfLz5dYLc9Qa6SixNNuOK7hhn

c4W6R0w7qO52THAKbGnzbhGVrkhmT3CTZkbWyEkQn3W3o8a1tRoX8/ADienYt2Jj4hq0P50cTili3MPe

pPCDMLJi2Ct1oc+ZNtClWt1ubJP1pj9L5S1DNmlBkw
TB4feEYU11GPIyBdCmyX/9ZKLojG6+neF1F7eHVRrqEgKdmVhHhLFYfTkKlUpZ0qyoIRDx4e8u/yVvem

xTSIexEBl5zKu9aLKIaSJKUMu3VIJphVwmEecvabA7oWJ0CVIvhUcwTBSsSQHz5/k5V7Ov44vyKNvtvt

LP+K31wmn74F8H8peGmlqPr4+V5EWtrrX/JOsudkO6
M4lwN2848C51sc7r/Weiduk+cyhs9PfgTD/kRVcPq9SM235/7KL9+ochwzTXk24FVuxwUhC/J34aCR59

/TLTJH6aZsBn1OiHfAIKfYsv9gQmh3lCmMlp34zYvzEljQwnua22CKyVHiAqeWr0VSVHWeVgNyGcwcmj

McBQyy/KSaKhAfWloXfbD6Fwv+xse+ysw+L2/c5Rkb
9cpJD381HTIs4ThspLnZhetNkz7jHu7umTVhwy6qyF6Ne2ZMApMVArVKUFV38tnpulwvKboJk3TqhgmR

qsliQiwAO/VSx8zyEzG2On+b5ih4Mau1Ej53Jp+6jl2VxAo8G3BCa9CYCMSUU0gMHGu/nN01zyP77iSN

cLAwfRcHSRgIf+7/5vgmOlWsygRQcO2kxTYt/+7ql7
n429CNtic2qCWyrnQmMjnE6G8A8cz3ZS5MYd0WbAXXraSbRe8vKfuEuNjWPrKYJC/PLHcvTORzCLuwzl

A+KqyrAcjiNCjxJyMCZrOddQQExRbt3HRE7t0OnlOJFBj3TtsPm7DA526GnAcC3TIUSlo3sp2Rlj731B

9n1ZSZQS3RfVdBUxskuUDqy8VBVytryYk6kNwwduOg
F3rHuJtk8u23qgRN8So3GFtl5g72oVujf1Qv5c9LB+O+rZ1lm7MEzKBvfvp1sW17AQ05ZKmnozJpEAQ/

R7kBchtMbS+56a/GIDg51gUitNZ2a13XSv/HQdwjJzRKJab4CzNw1hI+56xCcK0g62hPxbUQFwyht3f9

xJ0MFey/hBj9uH1t2CIABHTCLH6V4glc11/FqLcrGr
Eh8Q2zDuFsqMbmv3XNqFvO9moUUEgPW28zmeLr04WGIPnluJKo7CdudLrREnqhWg3GDykORLnvQrsztW

cJC+nY/19GPv3/fc5yHQRFDnw6Git/2dm4lT/rYvud1gvc8wa38/tq90FvaEIeNF5QA8Yr7tLAxiSsVX

Pk2P5ALYbmiwKOobH/2XFOTprBEcaHhi7h40wtkHfL
2lYU+imku5BHI3QIq5/FpRk4C8n+qvKtvuLTT1GhpE1+HJCgdr9SSbCpz6zv3h2VHRYrBBLyZ6sn2GD2

I7s5+nAelE3zCURqVcJwjJMmptQye3bu9VHKDwUglMfK9VAz+3LWAjS1424VaQ6HPxa44uY9keyu/tQR

pNljCPreX2H1W2vf3V6La20keVbHkVrOldhzThSjj9
KhNx94pQ5NgzCq1aJsxfRrVOHXjlAjM/8t1gakgNhg3GOBJSnJ66hYVEarS/wufPunIB3tf0JvFgq1iH

aRLwwOJNeCI2DK3gzPOfSiy4iEKPZht6VjNdirUg2w+tE693F0uDfatuE2Oy1lnggCHZNGsam0igzudh

62fNxyb87tqz/5WLeICMWzNTxM7trMzGr/5zHnVn2d
3EEfUX5/XLGoq9UqrBmoR+UnxB+S2QJxZQBo4hEnNCJ0a+5Nh0lEfP22cY2qB/LjZHAliY9YnFDxI2EE

B0jqNLT6EkxjYj6+kiwyF/6zYPm0/qXq2h0m6eamhz5eH8YHNXTeyDEk1/EHJT0nCRTsN21Xd4xw88N+

7jSJLujntY7Ap2AUYv8m3p5sgPFJuh1ZVT6RZ9mEcV
nOCkagJa6q0V5vaG3xbH0yD0qCBcaTM6vFDOgAFCq0MzINL9xpyN6RLwVMuvTr6kOAXjWBwr5bfXg0Qt

6RySoEpmyzDPOgZbh+Tya3hMYRdopk1IknJ/2X+3tmvpw58bS1DIMBDGUaVDzMxGAiU70mtp/cTYfk+J

VMMrMkeh52e0aSvRFLFskaRYQxJT1ZBvUAJlzf99Ot
cf9tC4GK06VKpfR3Tr98AYT3+vhd4jJwHSzvkB8DDrsPPwGI+YsKNbYebo39W4VOv7YaG25bcUeZ05e+

1heFi+UTcivQWS1g8RbhUepGidID9skngWfSol/05i2W/RwcyxVicRG0w1+RrBzh75AZfgP+dYE5Wdgh

uoltdkbCsGxnrFoaBiGbyDnQMVIdQg+HJE3dAdN1S1
Tq62l4V+H9EAoJQHgDFXqfG4poMKPXvFC6QM89DCy6qhf+Out8q2U/niglkLDLsLR+ouoUebJmOkUQvH

GCAHCGw3pNX+cuVFDlFnz/uhCOX/9S2oW22clOt/m2tKhNoAUS3N/hQEwlONbjG7PxHii76jONx8cetv

m5LAIPhW7vFaZ3FaUCT0CZRbKKGoXTGdDTBiuu1+w+
SsR6QI2VbL7vWHnJB9MifQ8fqxtMDPKAb9REA9fBno077KjGmqufWvILHDXKYHS2Lz1KwL/PlkbRkuHW

0lRUDDP5AX1b12+lmHfDwR8lw7BrbAvRR5xztlsqkaYeofc28NHJKW8rX+IQ7GTeKoxwp5nsMEmn4Pk0

nb9FBY+sAjajfZwWVZlvYrGBqegZmY2Sw3JjucuPJN
S6w0onZ7P5Z8LOZlR1h6/2AZDJLXuH++wOw49WymzXsZvwGjVGuiT9AJ5V79ZISRryFKiZUo0pgce99a

4hNEjiIzMMznLYuLdfev0T5ApzYaORkoHcdDkXTzxE8LESwAioDEd7mPC8j2xqnTbwH8i28ukLYrHkk1

uV76bKM4Av9npzxMP9nc1KDqNY2v2Ek39Mm32FpJUk
6Zsx1owJITcnR46m4LifOsbvQz2fQxFmjDi4yph/gFAxEbhCL5ZJqG8CS9qu5wa2RIbwrikLI5cdmm0h

UyBGyA6UiNMpZuJmbRw3WXJXemnja14LfIXQNg++hhMdh8eRU6xiVFIAZkI1LHaZ/6jfKzVHxT1lk+xe

66fTBuv9+PFMDSLUcYMelHjRQFA5XJvicwX7q7W+ri
IpHCKcOS1jiltR2tJAplCcOIo6GSA/EKKMyrJK7Hm37tYyslLsUybSRmOftDaD+uk5QRHNq1pu3ZjVhS

qtFmhYjrZP7rTK5VwRXgkCyIuMgvgKkAr2q7anKJLZyyToyD2x/8tXU+Xm6Ja1Whq/inbrYq+gOimmWw

f4DvF86Lj8ACkAzqTqH8wVW7T67oif4lL58rO0XYPW
bgMQH0W2kv5vvhoZFfIjpKCf1cgM5qYkf3opknMURFOpOlbdxdn4ero57ttXAwsA2mRCm76jyFOPfqWE

Lfcx4TPSC3FM6erChdkOcoSbZpae+u3uNj5nMQn4/B3rhjFH1OWb6skjk5N64GIxgYQmVn2dREjg3BJh

wv50+sNty2+BHQver5+2LCfjjo3JIaxrtuRRZPbDOO
GoQFiwxd7YmXHWEyoUI96Nxz2OSSAnriHqsD11wvuHeLwqebdJZaygpOSc0LJbw81S2K0tbb+52axH+6

6FyTuKykAi8te7zAG4xCho6tnPnLfL49vK47xbC0RehzmbkGDRBA46czetDkcD4HWUjy87AJ1Nvyb15G

bVdUoZpu6MALq6oEMwZixey0XmmyDYZtCw2GmXy8++
eDjxHG2LY6c4h+NWyH49ydfQgssEPuB77ciM2Jr0EVlHoP1E2/q6VTdWI1cdMECDZZAVnF9oBaDLcIIQ

IZjDV+pswt4v6fEdsWO7f1cTI8YCeWwgqvbYR9WdgHiL+uUdSA7Tiij4U63gqZdu8HUz7RKcM9M7p2iO

qUVVM+r+O7G+D0IbjxwMjX40e2NzdjpdB62+AaWt/N
mbO5dL6jJB6ausWF45J36LxdvC5grY06oSSOvKW7W7KJ1Z/c//UOW1KhWy39qkp2+mhhIA1VJqn9kq4f

u7RhPHl96HgNNgYNpSr57WzsWfYgk5bvI5thHbTXoWzZYP69VCRcxjvKAHlpclh8+Eb/zlHX/IQ9KY0x

2NcLVgkCSmQWPgXVmE2foAo2qhsmBuvS4Oj4P128J5
hj0vyZ3PZ8Y6FCBl1uOp7Alu/Q8Am9n9lsa6SSJ0rU2VTuqRAiDtzxh3Q0Z7n0GGUAyK+F6Pz2q91JtK

gi7WpwLYHqvOsdIJAFNvXhQxm85UJV3Wc9Q4O0CXkbvedh8PsbTac/w+ADHknUkruaNdt5IZFW+buuZl

kXYPlOjjIoghiSvT23BbA4+2RlDVrYXrpUqL44/4bv
AgQJW7KMjO+d7l5i3lc6TxV/nHuvkLqXi7IUjvvbNy9A4xaqXaOUAhRmN7Gpc13tw8rYwwf0Ryunzo59

nvJG1X8TgjbsffLMqAkkGdu/X/sDCUbCTAEUdu6nX2C7jOd7StS/HYkrt6a+9HaWU/T8cfmNzobWfuID

JljOkWpKpkqdwI+YdGCFIvixa/1piz7rOK4574D2RU
i/1N9rqtOMTtFcUywubib5jK67l4Iapa0dRQr3EX+NaUiST/2od0WzKca3d/rb2zBzoGdF8Y8/iBZN28

AoYHo6hGKJGx6r0JC2nldqpSI4flVRAyQHfandFhHP1qGwK8+2tbDZRHcWYqCbvo4V+/XMe8gbwNYWMo

NGXH3eu9Mxq+DxamMKKTYi4XKvhrpMXS8VERhGJPxV
yzfC913ohJoWUD2hdfkH+0p8Zxac8C7eMNjxS6HzMM3wDCYpFRBPqMvpq7hfbjWzLklX9uAD2/SG1ncx

xK/do/DUyHDlo3Z+ro6Xd0CwfGSBYXAQNPBk/uiD3WCVXOND7NJLmQe3UE6UoXgLWlsu1RLrycIZypWI

MyksAQ9DQWjsqevJr7+/0jgvDGQG53xK0YdmlgOqJr
MolB59+5eEqRNMHx5JVblU4/G9CqPyNRqlLwtYXodTFiCX2JFDCuOTmazNyHcNCVTesnqWpcz9PRO5L8

+ZcV9M5EEzT4ABn/Cq9NIKUETi6/vftE2HCs/fYuz+1PmSPX67tLRKl10IAHGHQngfnwl1qAT+sBc3bl

K/J+UFjEzAkMn4GGPwcxhy3r9jqZzlhFjHTHHleU0k
frncBwNMgDFWpHXj5wZF/aVlVtyUCWPH9iilpyPx+ZS39e0lcjEXaLJk9uVTxI5f/cAVUmOMT+qNl3LZ

+nA8uqsvKkSwYPiEB3tOOynMKtSqubc4CByr5v2vEiyvC+ZaJYGYATsk7ln0t9f9PDgPYFT9xNxP1phL

WQSyv7JlXTSaLKA10DpY7GpA7zAHT6ZX6tzB1sWBiz
5lnQeTLKfGVNhUzRanxaizf/4zw/wX4WpBilbgnh9mfNNnTpqFKhsW6WXaO5Gj5SmJYkrqgDobEH6Zpg

y52zL599reblqpym2Os2szZcF7wtvy4YOZgnJ2de5ZMUoNAKcuFlUvyg8caWXI+ttNSweO8/QgyjVO+T

JQniZgoXXmxf+Cquy3MvCD/gZkZ9QZeNdOnAqlVZCJ
dP0cOfKwft+K2OIEjTXao7VZZkjhBR5tgwUry8RSkoFUhRzgwg7LRC9tj2sK+IFt45QSX5s9X4lovhXm

YlnapdHbTWIaVsKuMo2WlatsPncJCvWl4pMHMXir3gQssgo1mrrEXLmtyax8q04mlapu5fdscGQP0cpm

3pHVIp3X6x6f8XrE+xUmTQrBHuX4k63V9mes6yrcKT
UTzQv33q8N7YUwInj+YiRDKoGVw/9LwusLmVbO/wdTAh9rAIJH2mkSL07t2oZ9qM4tJ9vvK6jPwajXEW

ie5wFMN3qnPR0MMEt5eivrWZkGxiPqtiCROLYP+nLOFOYqynVypsTQr7a/rUCsKWEr+n2U9n1E3eOhSv

QQVEmZcMZhkuwvjpfaFiCF2tDf+dIr3sxi3d+15eFL
iQyEA8kP3NsYk7+LQFxDgIzF9wCvoG4jBXhit9Wzy0POOV2B1AvdaC+XEwjSsUa5Gyp2XMlcYJQfmRiF

bSVqfHCG6qCFAWig4ZvF6s19rPTqqtlseSICmq7bmsKCFWaNNnp7Raok4BkA/AA5Mrhkkq0zFMKeurGv

IYHIRM+0tlcuMICtI+uFai59N8DVVtZ8wySmfkwyX6
Vt1MvZcyj56mlVc4o/mIA59vCGi1MGpan4oWIb4wozrUGBGrwnwokhI0LkV7c1cO04OrCAQc5j16DwDA

XwaDw05bweMut71uBSGbfKbJuM/i2wHNYee/FvL5NGCeWdBkcWfSammp73lWOydIMJdZiVhIPQd0axyp

cy4PXMEY76/o44Rt4rY/5L3/d0T9TdtmeQfi5QV7Tu
c1vDVQVN/JjGyC3e2Rh/i/AjneKpwGtEjJPEsRLm3FESsNAxbEBSIFCFoS8fzXgJF6BJjlyG/Wx9V3YY

4vma5L/pE0jU9P9CPX1rllotbLuKUaZZvhMtDO8D8lzx6HlPbxyCujh8PocdQdhf6VnSfqpC1yu537O2

MoeJg1GyXAnY1G6+vrUYyk8IN39YLAmoFiHkWa95m6
NJB5gSH5Vopbq9IwNjteAwmLn/kn6OAiqTsYJMbdeiCrQCl14u/fN+1OUHPehJlNJUvgiV5g6uoDFX4t

tU5qb7kHtj69fNSY+vZdTCOtjKJ45TLAXt3nwwgSijJHDYmChSJQoDegJ2eG/MssNa1GaWwjuCJiK8fr

xEnyyBw81AAl8EV6r/2dbjTw16rGyrmn5EUAlizgCs
QxH/0l1W+yHAZJ9ai2kkgzgNvqLf8dt7yUzLf6t+J2LgH60cSbTZhtMUXlE++EHBD5XD0N4OxYT8sUWJ

CH2h/ywiMUiCfv36LnG8FBu8VX6HJ5U0oujTpQSosw+Tj5uQlxQtICGdD8N5I5NndQm7i/oKojkv8KQE

XSsCX+cj66rei6wgSUH1Lvoos0AMim/1zfR0cJtq5y
AMHD1tlB/kO3rSGJmAHuP8eQ1nqa1rQCBbmLg0lR+1a633v737E+PtLrRDcTFrd7e0maIblYpKmoqRT7

Xt3eZbey+OyUjxf60ZJ+8qLmY7Ff1SbPYLEW3B2+mefxfq1zdKV/+Zuml4mD1EOQFC4cfP4zSK8z3dhN

rY0l9Co5FUVHyVqLXhBYoJDD42XwmE4HV/MRz7R+z6
zwiIDdlvNM+shzU6e+nQXXm4J3rdVIY7GN9kGw150NRhjTXJ6JKPbrL3lT5XtZ7OdNU4hEg63G/7xLl8

rMM31QmcrQx0xo2bw5Uvq3VDbXTTVTBRDtfXpC4UIafWwMpqK1ju0phPe0y7iv6Ub1HBZL5/xyA48mf0

8FMzsRZPryxopWAfcYBSenpnC/mABTQC/HiNJtGco9
aeG7j2dDbArRU3sFrpUXUfuIcNYz/VWHPGbx1ovil5++QUFglYSi/Rn4rStJgNPq/MSXOb3EzvFGPCN9

47820KAdxVybL2cYM/paVcBb5opY4F8aui3sk8XW7kZ5WSbuTcUK5ViUf1mAExAvMtdsiGyFLAFHHBTA

awGjZ7TH9I6nxh7qdej1zjnHmKTKF5eCQv6/dEp0KQ
yojgL4WG3bqh9w/6xCMPVDQWyLaWbi2mI7bE2q9dIpp3vdi5PUsG9fLZxel1HLy6RDbR9NOx/akNs6cb

SSHR0fTu7JhNyiVNvWnSLOrqO8I/iM5tQzT7pqG0beAXBaYD9M2zQag/Dz8Q3ysR4LikwMA95gDwTnZ/

xd3k6caD8ButYNdkEm2MpRSpSgHtEKB4dkaUh9d/TX
RrI1H2pkfJiVQd2w5b/Y6VlEY83yRyDEbDx45khb3DSTGfwc09iYLaKNMinwj/4rdrNGrW9rRg8PzfvP

4C5VInXuEi+vVhP09nvAdaOYiT+GzBW5f0pEK55fW9tbeJ3iwpcqpxz+a+h+J30h3Af29NCm7EnLln2V

X+spFFKe1MhVLuVbmeahRIOaVqEQLOX/li7KG2vk/T
4e+FfG2XUdtIdNwQ1OLWRyYQiV7Q2AJrTYpp12Cfh7SWOfceZfuVuDljnfnHZyN06iXb3+U7tU7/ic7i

5je1o+aU/CuUf8MTqBuAv/uHNcEhEL4s8RV0PI6C1Padurrpqho2OGHQ2ItUxIsykSUoXVEw5dh2Oqg4

b9ReGuDQswuXtXwCsV6mGU6aVUKXGTgKz1c3F1zppx
7k5sJkfxdtVwqw0cQCW0Za6HEYh5A0Di9EmTmzE1j6A/I8eWD4EGfuUoxFMnc3SxH5WkF31HCkHFFC5X

Tir3rHP0MBTT/0Thcoq6fXdf+n775Mdo5xs9KW9Q5ATiqnYi1+JDQI8tGb3pxFI9AlM8Le5Ir28RUYI5

iXNq7jRodkuEYjeL4knPnRfrnzuBRr4bXEKwm4bLzy
+eCM60ua99hxJnXV2dgD8xHhmgKc7+skEyHo+ClcpiLLk/p2DFy25pSS9U6dQGxgbW4xqV85FzWwZlMU

7hjk1tkLLseBg9ghofB5oZhL6OZnQUVnCFIrksdJNt0q5qLb722331TS3GLJ4BJt4ckGZquDv2IGqKxQ

y7OE4zHtQvbR1jAs+hThCiN/nbNi9wkl9KCfH1Va0s
mEhJqq+nCQotOurrv5k4PIPJe6PBcpE95tHpSTE3q0VW5LgbWJ96NcIybwXf0CAPqAuBcJK7Q7ACEyPY

ZTXhbvjadfkiBDc9cR0CnR45VreV9N99qExk6stFFphxAJtfEDEyTxmNaNfXeTy0R5/BexzEP1wGX32Y

kqm8GD0+hFGsbgKoa9jHdFPdMIfx0JdQwvC7I7f/F3
ycQBKY4GTVEoORJAFkPMZco5hHB4hcJUICOB/etpvwiEMeVGZggE4iFNZeDHiRu3vVun639Le80pb21z

a9P+9798w5D4h4YYb7iSB13r5O8y6sg0JxDb2/dS/cVWkSuE4wBs5O4sM4Rl0v7iGWisSTFtep+6uAlR

97l9Csw7v7yMj/dHbUsQRu6Bci94l7BGsUj5TS/hjT
uUH+GWoiK3UGQBXMnOa3bJ/TTwE2qGt4I1R5gDBB+0viA3EFhVNNaawStOWgRGPqgakir+JlEb1fTiEJ

5k+A6auUh5cxwPz7wz1X4V2rIh3w36CxruGM5CNvfeOWPF7O1BSs6P3oD0qFiE3ZUkgzOWnLyiZhaLlx

scY4Xoabe57SRvlutUXv09Ky2vnwtx12yjqYsxC64n
RyXrsh6+fGUCnczqrZQGv8Yq2Owy1XB/Y8jBglAh8bWNoe1ZsdzZGnN1bARPqhk0e6WbCs/vnUetVpMW

Bl2y9pvAZ2hQkqkrn0HCJmKteALJXjtvzCGlBUHeCdkYDgv0Stywl3z1BX15ryDYPkbAd3kElY3HcXjW

8QMbj9pBlp5+WX8f/6PHqdVqOkOgQSe4Nln32N663w
3gWN/6rQh8ULeg3N05gW0l+dYa7Zn6BdNiUOcP9M6MKemNTROvDIx0gy+U2C2Kdq7uvXh57vrzOh74h7

cSUo98J9f3tnkGpD/7ayv4/9Y8IrfiQskSqd8RVUn0Nger5W0h9cfU7Pwr+cLhb4XqLECnCnfBMjbVDL

tE0U1B5YLoknOHil/mgZj2zsEdDJ/YZkHNLGeaXLgU
ND+5yd9chC65DIvDUGw6JO6l+nLrFqUYOPNSGpTLyIP4UduweQvB6GrP3PKO1hm0JloIhPQ89pck1nsd

H38mocxJhHDyRxJxBKBiAU4Y/fuRWS35xQ2f2dsWN336p2F1awhFEuRg353PvBAA55O36UTBaolM5XTv

02dUYTPyQopS5eqkdAMS3ti/XpJhYTCxWNj67OrmcH
trlMP3iBdPo7n8OoDzUE2n0nmV+GM/yCXnP3RCFOpq5CDKRewGH6ZusU1ehfr715LLxrGlq+WM01tevL

8WQvuQfR0ZaLpc8IfjZvNGF3an+Hi/mToHeGHBa4kCtG55hZIiSL02T/J4my9IbkqDtwduWWP1lwcDs1

HaLM2CdDu2XKvp6ifT6CEDyLBCUx7GJQSPyTQbGwCf
xgSYAv1EI6YBhZzxTWfHiYdlWDz1sBTvACVSAqRPVJhIuJHv2U2bGyKjLJzsR5ayVoc8YkCMjwkt+las

uYGPxPddP/jdB2RxGbhvqCIA5CUuvjr8bgRrFQBfXz4CZY/sTQGAJ5exMTxAUJPNvheAtMTMdmL7lFzh

+vdpxPfv/s0KhANr/Q6FeTNL4RIxvha3UxxyEw5pse
FEdU/5DkUT1dMolsYN9H3TLYlnVfLA3DPp2sWBYMelPmtMofuJCjxvhp4FzsJMBZ21Jk0bhhMnesWd7n

oLOUJTXmxn690KvpIMcQSGLvx7Zj4CFA/ZUw8tfCkPPEaTsoxuYrbF23SlUjF3s632v+fzapbP+YRoXj

y2pasykRCFbH+ymwCURWZRx5VCEwa4Y2QEeZsKszXY
BojGzp+7nqNFBuDCyR0RJuoq3oVCSm6eD38Z5WIe06Q/wK9zYgPPDKehklvNiCbivUdkOkW5p9u+0Uov

9LggYFghdzVJCB3ypBUm7hp7lexodiMmfp6Z+fOPdGHOdh8WVKga3TAl04fIF13PdEaAKDJ26xTwsYWD

1itMXi9yXNjcnKxT6Hvz0CyJxMmYsOiiU6AQzkd/uR
BNq5ZEdmRiNUtkvmcW7VciV7fdNGtTCki9F1kyIv7NjMemY81p9vP/YRTSjHq67JeOG8OTwHfalRmAEB

JbJAgFEIPV+2FQ57Qg3JKfV7goAwkqlX0ShmtNDGT6kcOoiz3D3Cgs/A1ebDnhmzBpq7iiUe+mdE1ppg

8vnu5oWFkdgsY6fdjOvmw09bAxe/ih074im5FbR4J6
3pG0GCQG6l9JQm5mHpSTtXyTXE9mkHf038rnpj2YMHP7cGiAk79q1vAvVfuPkoAeg7KQG5n3P/7gU4rt

bNv2BBzJz6FkK8N0o+VWCV/0vmixjH640ouZPBkeeVpIdNv22Med3vxBgZNmPJEytSelvWs9xcnKYcc8

Ji4vhIFvrdBNVj7A3ihtrQMK3v+8yCPTOqAqLEfXET
1iv0Ux9kGpnh43MRHlDejUN2uiOS+ohuP1uAStN8f3mEhztJFxk8+MObWhOH4oxscLm66BolHzNEztw+

dhZ47lqzQkbwrDcqaJuzsbvZLYYDnXTVgWBKcv2U8uG/Ue/tFKb5KfO+emJ4c8mOPKSk9H67VyxnMw4C

Tvwe46KB702jg7XDi2+cCADWzV8K59WNpFoiS1mffs
i0OgGLwxd8Q/aURzVauuU2rgWuG6WQQSxJBkO5aLTvU6MJZ9qQ+bvYgIbsIGBIgtAohKgnW4tqoJHBht

CwV1QEBYwDowwvUCZ2tOanukKuf9tEP+VrbUOmP1jAytkyyyTQ6L1YkcPcCbFnffJx45vEBd7JAIcHqd

hnQB6gIqUatoTWakJ0M8eN9xlXvsUpc7LrP39342OV
acyZOmR5/Fyo/o0Qqy0gTvb+e8NGMVutlA1CVT2NfrJ+GSOP6t3/GC4M1HkcKASssxi4S0HYdiPgBVu6

QpHqv4F6UiCNEzSF4/Ig9avs404a/cY2AP7nSJfmEgT5pbQ4LpF9hyJXo30iRH7IBNjLBPvGUCvRbTdt

LAV3K+7nfr4yf26fIZV1/op2k+8H8pUl/kbdoBoB8m
ReejPV6y3v7IkXeKPKODHq8FC20j8EDLBPKP74BEOrfHYEWv6Tmt4mVovbuSgiEeUkdkx02WVXLu4h/C

9+BqU1Do2yRTRqUeIMLmrWa27OCkBFqXwJ27jAAgnYaUvRlDlXk64IgcYsJ7YZrfZ/KIvLGOBbzyqq1L

+l/sA/3iiustAdriktmOQp7xLn04locQQhBMLovRSP
L8L0HRZKOBR3m+XIjgp/DbfHVimA91Jw4V4NOUORl42mRSTz8fFwSBBBCb9jf0utTL0zmjL9rQeAG/3F

i8zGGI6xdDs5wb92mQk2OmZ6n0rir62CEq96vfkyuPpUR4rK/zK6cWqipFUOXP+BMoX2onLpmvxkZp00

emrtEGaxyNGU51Ur3zQRPnS3IzDQ8ja2/339VmDsku
uPnEaMVWKeWWAvOxZpMPxPtRn101veyoMG4dFG1AJvnOx+zuBtbD484Nmh16Zf7/XaMausmkuux4k9kX

rviPOuNyJ4lMhy0/Vxj9GOppTNOEE6GVoffbYS0zyy3TDd9ZhM/wu4uczjYTA45MP7BVpzDgPvbHGzsy

+HLZqivN2r16k26taOLpnYOVGpW9sEJc0v4/NqxTyA
14oy6hQsC2IafwIuEQUYxDXuOxvbOE65lq4mox+we44Vsrx5CrVG8trC7sTqMGoZGcP0nN3k2KpAyM8B

HgCHTbuzsbXgdzPWsMagg/hyddGqgsa+hvJzCaAXU5FvSxfUkocae5mMdnRxW/GNydVm7I75d/MMVg6k

SvTPLsLLYnRZSOdvtYDSk8829M+CR6Uf6gF1GKL/qr
GUjw6/KIWmv0kJEblnecNEK1bvgCYzRQ7/Ah6FHsPzOfqSPwGH7nktsw3uZKDGWA6JefRF5a57h31zyK

QGOTtcpznvICL1WZwg/6Bio4yWgCeF2+PhKw0Ly8JNoa5Z69xDy3HDKUuRCVMcRRLzFPaXXGk7EkUh5C

XesgSk5ewhtMgcWrRzyZ9/1ToZLeqqO+f7OM0zV6zL
c1T8WPKTQjp13Wq4byodJmki9d/KE3T0Fse+CWPxDJ2rwfMMd0X9VcQ6I2KbZFOXfNYApBoaid+eYkhd

Jl/GF4CJvFB6YVmN9N4hUBTBv1CtTn8Z/mZ5a/SRh8EzxPb800rxcOLfsmg6KvLV5m5DlRi6UxFuGPPR

fnTHe+mdROiZL/nDeRH+6aUHHgO2r+9KTZS1l/j8vY
ZDdi335Hy94X34qKxHfEb895orHp3klVzQkD4irTRHb0WfIpsphPg+/Bn9FOuzFSqZhyF1wBx/9wwFnF

uYrcw1Gey1wo02xur2FjdbRnNeZjL+AS7XQdMU/xQxI/RCyF1AqhrLy6kprOoajvgxIHGi/PrO0t6/Z7

vB3cRbtYQGaf/Su6YLCXMlBy5m2MezHFx6Qaww46p1
oxLp4XRhRfGlDjljZDkV1LheJ8tcCH+ZOQG4IuBKcC42B3Pj4dfyKWllm1vRMNtAfMqNhVFsc7XT3h2u

70vLEsqt4ZVXA+Q2miYERLr0d1r+/PypH4fSjih8ggR9gHCs2wl5uQQEl68lHoEblbJknGlEO1YLN7oe

5jtm2bVwTeEFEpbvS1f4cAe8cNmRM0IKb82Q2D1efe
OedyKkzNKO9DGobKsKGg0vl99CMSYIp7d64/rsJvmetVQ5ocaGlzFj3CGwPld5eVpvqLkcpHEKWXhzEx

aZP4OFN0Df1WHmP9vOOL+UmDr8ldsUZsxQMOUT4CI8EgztZS109Fhnw4DrVfapTcT1ibhv9Pdlc6eGml

kWJObrombfO9B6t6NLd6nhgmDMO8gSFckmX91WzHcD
7eN+rxRsHEB6Ur5Xk0Gst4JNVS34j805gWfh6Yy4M/RWcsoNEWvqQRA9CGuhEjgVsJSXfg/mVY08FOQH

zso4DkanpH+Pu19WhpmFSXK5l7CL/Hwo7vto8sVu0l6ItANP1NLEkFNCMsU91OrYzjX/P8Tfc4OEJN5A

o9H+SLMO5nI2zHR+aa1YGUh6mGPcD8n4CGFrc3sX4z
g7ehYGCownHcASzzxuWvm44HzqKIElrurCiW7WJw0CIf+SN0ROZA8dQnSoz+EGYHb7mfZCF38WiUB0Po

WN8Ad+6ESfNbTmx4fL4O4VM2Ou5x4Qia38OvZzYmeB6UQjldJQohiMYiGBXUqPaaeVlOdthIuzaQmZoB

IyLHruZOICH9fvBfV0yq/b7VARL9ZbNjrRUckuuy8d
QLTXndx6JHK2/Vg8vOqtnMeN3BMENMibkoh0xQYNByoQwYdCEqIPW6Hc1lPb104II6Y5TkzTC2d77JXX

fM9oU6EfjKC7OJV5pNXaTb/dkT+lZ8BCjJ7s/cQ19DTmVNGPJs7DgLCZCQ+7pZK7XgFsSJXSve5Jd6kt

newuOm3FZAfGyga9Z3WgLc2FxOv9tlGWJAC+jXWRK+
WFtebaa0lBqZzS3czwV1Qn1wKEuzPgvU/sykbLXNqjt/Yv61BMnifsVKSlK1nQWzNOkGeQm5rfzfkqPk

qlysrl4OyMkf4vypuaQXhZdGpP1j7HxrYyQQ3k/xgZze/UiWm9L8OH3nmjjsTQkSNOai+mLK/UxtxZWi

CPo2U+kokJz46Lk/jJqV1G7HwulNTXjORxAMkQ7zch
cqhoCfdgP9MpGBQ/U6pUZ5uVaL4jCYMe9rir8jfh4/lzZZgY/l941OubdTemu9qkmu8F+Sc7rAC8DuPB

o1bOq3IPU5V7sHoMZtgjG1Aro7NMqFrnQdXmDD5jat1Ygeec3trrUjmNpTfpQivJv54GF/pKTXx9t7vQ

IdrojdouBIGGxsEWHECdr5vL3FzmMvM7bPfzfIyTrW
xr+Jk+pGeIS9QFuOzta+CIiIaimsvXu+tnkBDF7h2gqgxacBCPUcHxbprk8DWKLXSVZhRZ2tXyDKQZ2n

Uenl7S/ZXnqOIOBm2eHGEtKrKQ3IJ5p2NHmUcxUficEkcHGs1x9Tv0WM3ccG8BjzuvAhkMsJePfGo5pa

iSw95iluOq0NObXjftNjT1goHUZDUYA8M5hqtJ180/
Tew45BvtiTrfjtjZ+8jQ+S/FZ28xsK9rOHbg4IpO3P7AJPWBu52fIIfd1yyKN7E2LfWruoz20da0Ji0q

+6ras+Ze7STutd8MuHwSYKFD+mGsGVddEqDBUPH+1KND15nHVst89vRsywZOvUpt62Mdq3xRBpIkPXq+

bsb+3t07ra0+5aacKHoVVIzwN4lxQ1NLIEnUsbDsl/
1tSZ4+3VOhXuMPw58XlXgUDhNpvdNmV5G1ID56IrBPsxJnqDnoWJ+q1rZU3LELExwydYUeAvpkEN2fcf

uyhTf8Y/DO/szJwhsGJmsSJeLemr9oWq/55LPjCtAkS8oTZBa73h1ysLEk4vI5atm6/X1uTlh/Mj2zaI

suQVvuhX/vNkoHfkoru8moBbrHpNJ7mHhIELXeEE72
VRg3O13rqNR54JgKk5NrpwFqbDp++DvUiU2Sz2/0+5vlb0JRJKbw1GS5IJjnQi5Gh97CVqxP1OhWalRd

ZFNc3sfDwz3/sr9cDdDG4NCdTnnDuel6Bxh6epttxR06KLdjHgdBIReXQjfsrrteFLSfxN2BgQbyrW3v

/lEvjWt1GBBSgsIPBCt3X1qEWX1ADbsgoBr3PbxYs6
K+ReVBR1Vdzg5hr1WTzI02w7kTytygsyKgNo0rxq2wNv7KVuH+uekhm8MHPct5UkKatgPya+1vrghmJl

btG9ClhSgStpHVtdPse9ClX9+36A2+lwRdrQQXKd7hUO7uB1m4WI5H6MCV5ShPDgf2sE6zMsLefVHOpx

OBBnteqAFrODsIzdp5fD7in/BnKlpyhiNE1oeCrIqC
50T5J/JMKK7vwLWwr2quH6OeN7YB/R4VZDQVflRAy9cj50q1dzwmOPCkrFGnO+gxibph70G+7aSsVwlt

mpKAnIUzRxuA7Y3Ejlz1J4xXQlAuxHrrjj3AfEiA/YdR1bgnmSGzV63652PunYbO3grXuQlKmjH8MSa2

ILxRbZx4mi+lOUSbJqW1kF1ai8PXuhGYyh7vGnn6+G
GoQQx6p3zHXBtRAaOOWEP3zLQxhlEmNBcRN9euy3FlnC+0uqHZte1QJFb+5s1cgRyiE6NxbWv5JlsNc5

XIkdOQ6oWs6nI0lz2GfD/fkAa3jJ2KhjQXtU/bqDEwbJs/B4q2fbKKNoqIyTYesuVEhJRCi5reTbnwUA

HDn5EzdCbgwY0A61OUIbrtERzkLeRT4MbAkWx8EQuW
PMVPOhytANOeBBBY5ZhMdKfMfoPgR21fMARPsPUu19gWzta4PnfrQYRYgwItA8HviYUvc2y/lD65Vida

d7+s3GRXqmnxDZUQhsHnUfB+/DeJJREwBP6EkjlvSKLh2lVvlT1Bum5cA0n0v8B1lvrPwe+3V+e4ZZMX

XADbaDMibS1ubAV5bXaMLxi7JEru95xeYQnqPY+ffL
H7EWXhoYSqBG36aOo5HHmGMfxl78WXLL9r/V4OAJBd9rxLkAYX91aHbJMq7Gpl0Q+Wlx59UQVKd9cAh4

SRp8gCn69bHwFnO1wmXfn72dNAFP3ICoVLczgECpsp8/oOk6Ak41O6VcDtC63+EMxcyS3oRIWNeW9bhS

XUZv/QYeOT0bmT1U845GZ7EIO1OsS3OH2FNt7J8vLE
Qa2iL5v8/3E+gTPac0YcXpTltvWL+KsZ6xZcgmIp6Yn0uIYCwDh8MqwwtDiqk6wRX8Pn0KAKoO1869qy

8k4XbJFRc9uAup07yKso4M/fpUlRvBx3+ycnW7pNwSfIP12kPesoctp6JgsF9LsVowOlRk/YyX+icwiJ

WaGbHqniIR6QOzBLwAYQcX8UFs6T/ht/Bf//xbDHoQ
Dh4KCxeQivrcrmi9FHd574m8muda/I1EqHCwFegKmzSZoNfeHgMXQe52OGX3mpsgtV/fUcjC01JWxi4n

r1B/VkAM+IUT6RmvSipI5hGglE/hPEBNF+5aU9YtYGmISyb/3WuHcsX2yPm8kfw49VUUmmA+ooXmlZNs

XRPh1pZFnLXslWTKm7srD/MLrGl7oTeg+G9xD2JHVN
gGf4Srl5wS4uSXoX2trB/Y8Q84B1xbtJ7EwkxjKTR+lbtsI9A+0WFGh0d6t6qgHtUKvzf8OA5cKomyZn

Qh7IcmoW2QFJaGyvUtj2dyD2yYDyCXzQfKrrFFUXrI6rrG08YB/r3vxEIRG3OLwytkP+KfyLVM5dvdLj

8qvQ5CSMrMhyDJ8pqTXSG7XXebZNQSOfcdlzvE727W
GBvBcDBBv1NDJUNoAzU5HHH1xYFg8SdFeDNOjrIsJqZOQoDa9gquJwKxGeKVmCEUynLWigSz2LaGcZI+

miVCvAc0uQuABPfRK6XGHiDB1t+7YaQx15EVr5nHvaeQs/ytWnoRiogeSdatsGsqOA9qkDlmgTGB5QeD

ClqTyZ2lEHRthkZKRyD4np04wOZo9FsgRKwPXbn3Xv
yuV6DXJ8sb+ZF9l05fk0Z4eD/dneu+qhKD154zgEq6vZqfLqpFaCuIOOdkLCfWYcseRUbfQTW27ixLla

oi567XTFsAhoikiZiS/uK/RMsMv2L+pTxa9On+TVeIk6JgIm2CMsdey9FOhWzNqwkg2t+XXrYX2gBUqr

hbRGDisFOLlUtUrQE0X/PIqh7RKJJmqhOmIHAzTUEG
DfAkbFGtO/eaVe4PX2t/cuA40+iotToZbM+8SHKeyeFkMeVU/AofcRvEm3k9wW2TZ982QBMeoR69/zBX

sWbYchpVl51k+8OZkOWOIA2sdj6gXvfuppLYr5G9i7F3XlnrHDzUCsi0/LpyfGSlt9FobE1+WZIbG+lE

mi20O3eHDuNBSjHrfa5uUGqMHPfns4xL7OBGYAD3yO
uqApAlCxXIX5Qu0PyjT4WA1Re9/a7b8TTv2FaNqT7+gvmWhuN6hL8FY7UFaZR9KkXwsusQldM0YTMmVB

dBkwYhGlocms3Ihnt6g8L7W9qFgR2gUdwPTbDNc7u7aa4reFqxk00KwsOZQIRn5q2iiSYYlL7/ux4fyb

nkHGn5qmSvTLreJeKt3aqQHo3c0FPjoMIeOyx8/p+1
Y4HhKAWrt3GkV0PYMiDckWGuShGltj2VqNAITY/94NTPj5VQLwm1SlGJbb1O+5aVl7jdMwL2jABAwjyw

zX4rc+t8mZoBoN6ghXE9L1FCAcNgt8kJBeKjOW31ALPy8xlqGl+Px1y3suFfsm0FQxHR2Qo6i7KPOrL8

YIOMhRXOY9nEMfvIwRU6u/nBsUGISC/f6OoF3y2nu5
JzSK1vXywfmFAfeGbU6SXHhGhCqBqTAOOgSHCDesWrL2sHdxsNY86BzW39U2EkhZ6LV4BTISxtEfbzFc

tQpwZTcYwRdqmAn0gi99y5Vw15xe0pcqOj/vmyoXoY6NJbdmAbd8vjQ8hdVQotZU41u+8YRBUq0Xua2/

dwn6c5OAoXhDccx48bfSGGBByOmghM/+/Gq/o3Yy8y
TtDIIi1XtYA9sFoZrJ44fQuGqIAGz/mrYjy3gHGuVLUvfdk/MxaxLPc3veFqZMolnFq248MfEgI/R5bK

bFs+yKynHGn2QD+joVzMnDACxonFAFdpKeadPeLgbvfPvZVy1ri7LBHFaW+HjxB89knPwVTXe08Z07HV

XZjOaxXruGsfpQh4hcTGG8M4/1qcP4YT/nAVDtd+ex
PmZ+dt7MOY5d3ayJng4ExRneeasNYQ0HDXZ9tUQ9MsuBs6Nk88xlZfWY6tM6WTVgiqSnfv2USGjb8aDX

+CDdmr5K0UVkAYscnHzh8M8o42TgzJqKMAnY+C2cHRZi0QDyr43RMFAZb0lOAgWH2HaYHTsel45QDJ9h

qB9/LaQ0wD7kT+/BQGCNwYZILBHXoe6rPzqOvzkoxI
5Lw+GAXLLYuNcDHAoTrUE8A99dzGGPK8hYT1pn8nPIzQSsxfgpcKJvJ3dOkS/Rbkwe1JJUrTIxRGqkH2

jJtrUTQ7nbJ0JXj0iflmpLHqTG4SowVWbbIvKL5JKO+4lhkb5nEdF/+V2L3j7pbB3xhdZN1MRY8fNC+h

u82m+o81ZwQxWi9VsrEHTfosJKnbfsk8wXeNJpjD8L
gZI1AjI4f9r6Cx2x9u10RHRnvttFxMGOHKCmRut6VsX5H+Cw78Yde9KZzMKC1FY5uu5Bn7o43Dz+mic5

T01RWINDKJZQ+Oqa0xuB1jn5vDDx10Oa4UgmwDs4wAq0cJfKbg9M1p46YzyxQBa4PGwZYr6BVn7qDseb

TjneeFgdWEVyXi05LDqdN4BlgCRuT/M5ugsgzt/Jtd
/siD5/uu0hsIsFpfNKC9H+qHBWLE/ymhGtooAcGL7g8Q39puyPLH6GCa0+Ixrkr4SCSv/GFv9tmvXNUL

nUkxknsJK732hRLRV9LmW9DzXIDBtrCUAYE2KOKczbTd34D3qM3cpsrnD3k6sLf1DGiwuGPRwkhO9GTa

x6cB1mnQaezn7OjtMLeJVoeSU++580grklWrbOccV3
f6LtLletM7nkKGVziC9FqsJASy1rXp2M/xOv8ovn3DpZ2KtoouqJq5+H3pZFsVpCr62hzUAruY5KFhpr

pMhAk9mrqQy1gP/YeTeNS+yMxbB87PRvPb0TlRlv7IJEkYe65PtzmZBY6Gyap5z40SpjmuyehO4FIUS3

nQta8oJYk1OZK/SUdZA0sajzxgtO7kT1r4pPrIzqjk
Q4ljT+iQxciAmi8DReG8OcViWzEpYtT6jTZI3oKxAKhLtVExFmm5WiS4ANN5NDMwIFQP24kirUtEYHrk

OJ+3prW4PjrbA2LFQgNvRBc3DwkYo+aU2fmUjc8jQ06nik4BONdhSPj4r8stB4M8VntO2WqGfRSkiGyu

wBty5lFLBV89Nu8Kb+ogQS6SyW2GmeXHM/ANhRdp+6
9KK/vg63bDHhKaeBPR5viCdihbWbuhUuepfKKojk8poFlOzSp+6VPisa4/I5EhiMDGpMeCBxyI+AVS+S

0tV+Cb17IKvB6p8xoZhktufsw3L5zXH59x75rH/9usNXbifFv9Vsju2bnNIQfsPVjzqLYC1oXlWStPqm

8KNEGldkBo+WXQZX21SdKl2A16JDVXDxqTHsYHbpnT
VbSkjExMMx4iWQqZclrUbjysm22hTmJSspmJz+cg7feyURcR4dFRBsY8lr7WNkNZJpDTCaBsyzICicYE

JRbqYL2nzDUKRmk7iLh01L/O/8rNqCVQl2m1fgTUFUbCqliCO0QLJuBDe38qhp/eMD7nPrxu0p3jihZH

YT+mFtVxuKk43eK47ja7UqIhrVG60XxEA4G8sDmnS5
My4yDc2CCHf/XOngcC0QFgNa4RYQfFkS8yU0b06wN7pJ9Etpog5d2q20Bo2gFtReYez6TJ0oh4kWxRRV

HB4hJWjik3Eh+isjC2IESgycFl7dphZ/2EBX1tzclv8N1RWDeLNJz/7j+ar0ClUdxylZhuw3Vq2UwWG7

Of/BBShzHhVY1StIIO8z+fiRFELvZIwllIYDxwup9v
ALeBQRFnNxHEtX+hnnk5yC+d/6zkoL+B8KCPUpvh7rzqxN6pQwb+/Gr/RSxp2Mc8A3p5mymk57I1JVk1

M9TslQscpdbrX1/pS1DU38QW6zG+2ZSP4KxyxLlxiPbf23VQNb2oIXep9bQUDi0YrpvKETXzsvYiHKtb

4BwGljPU5cyMNImOhMmny2951mpAuiig7wv9svqjCW
sMMPciXuI7w8Ce7/zwUlL7KqlJRPemRW0rrEHRG9lBw91sRszp7I/0qI2oWYh4jc82p3jx/+sirSsue7

hc7aCMf4jjiwkOnZbfSb94xFY+Iz2P+nQ7hu/U+AuQ9xl1T+s9j6oKC6xVdWC9xlfazJF/qTFoo8xjNc

811Cz8y3Z9skpU51m+eLkrD9bRYU5x1w3YY27X7Dcq
ZD1rg/r6p6S29i/FRqBcUkvExhdKWVTvEnBTbyLyarscF+S8aQovQny2SsRmzGMkESVp1NR5P2pxv0aa

iWcjVF6fuvRMNc0gL1zeJAaArptk19Ae281ZoZRzyOyB/t7Dow0V505sGFTxqDMVm2v+WDqodNIWWLHL

H5GQTX42/bFMV3o2jW6BC77yBqxAxTGzdoAsx/WDKX
EmHiu2FfPi5L3c77O/UA6PnE/VE48vePnwEkbcYLtgWSSna7ywLrW+SijGNCOulSkgl8DddnS/nWg2aV

xOju4ei+yJDt4tZeWadB/hudOhCGta+JsvpaW7QlNv6KPUa9s/WCbH65LDGLVW4uzrUqBjSUDdg2lp6n

fvpMkObGLmEY/V55t55H0X/qpvV4qTLXmKkr2SRLys
7SvyQI89xooYSamR2o3p1eMOTbAqnpKzXWoyL7vue4Qx+Iw4a7NLiicxsU4fsADT3a5vWpRjiaWyc0sl

CWHRfT50p+or8z1y7B6iRL7Ias5GYCej8t4yrxpKh5W0hacL1dRwh4xDzlLkON5vhyQXNZ5vfNhFuXhl

lxtyHvWo1D7IJ3+cTFUxkPNvI4e8qU+hJAP25uMI4D
DyFWykl5nFTRY0h5cOtT7/uceeS+hD/JwrlIJwVsv75wXgjoEMC+6O5qEvDgv4twMYHhyg4BN9cT4o40

D6lT1oq40U6639xjL7RaQ8AYSXitpErtIS9lJuAM9AZUFV30F2/p+GWnyE98Vrd9CLUGPnrblK8/cD4H

saIGe3x//5fagvOVGPiNp1wgqdm6pvlxTw7yzilt4c
zuGzaTIJyX8/uryTnuvUViIMlOEoFbO13oPD5WMcGkb2jQvvYx+RV/s6XXkdUsvwjd2Gji9wobJ+n5Jp

PEJWdMgLFOSejLY72KvceWfNwZLcDJD7wwIKr+aswXh1gQW8WqgeWzU/1+RJwIitUFxZvosgL3QLunNp

vfRHMbDdrSkjuIuVnEhcrIQZ0iV+Y7FHajuolBLHOW
VZ1ktQQPZ6eIMsb6j8bN+1s0YAP9AdaAysuu9BZwyPszP3Jk//STOgq/W1rjWZjpWOoY4E6kGD0vuPTn

DgVihtvYqEV12Trmb+NKYOV567natJpNDUV0J8banC61aVtvsrKpzA9Nu6fuIWoitxnXY5YfeIhtY900

ze5uYjDxacNyHa3/epi2mzQiNXQonq/qJi60COJYxS
fRr466t5Gxbe9p25HtzTkstbJ3HCPm8UCLS+6puswOzBDQM1qtLZjpJ07MXOVglYUh4ol9804mf/tOu9

26f5c5rdnh+dNB3dLmrLI2L+94icZfuOkf+/tUuOcMP5YUWgPvwtcCNVqsd4f+gQUwpRdntvmmJ4L0ka

+SRZLB6G+Bj7yCBdmze29AMPC/5xC39Fod0oMJ9Gng
S1AfNn6i8wsi1AgdvIIAHdyzOrjwGtMNbbwZ3ZzW8kt8qZncnhfdxr7+Vz07E57XoqAG5VA3w9jFh0kR

G+isH6lbO6yVHDeO1Fo2IfzR7n6B+DYEGJu/QQtTxhZtMSIJ0Kau37nZCl6b2mbO3lwYm29rOby7Ew7g

QNDe/SaofbrYBeidxVbmlgOr//lyH1rC8toEsZ35TI
hSTcxk+67KUBwRh0WzKhNJEMcSWvg1BPeLKVgoLQHgGJkIHSsDZhbv/o2LXZl2toabeBRLzNFExSkrtq

56AasRHg47meC7xjGeF0SgYieM66KcEHzMcS0L6an8S4u1wIRw5wZDdG/gF24rSjJonX2hAZ1qUdXYyZ

IKXNhuA8zFEZhxI03IMKZVKgvHzuW4Kb3AgGpqb1/h
e1tRGhp+x9NRXvPjfnI4nFWn+LVmx5q+GnCNFWlL+AqCl8uzmqLB+wdIcldAKRGxMtY0PL9KbbV2NVwH

NtIRjVHnxaZSxngggbVEBecMlbLM8QsyTtiS6BEM5TPWmWXx7GWhh3KBEM+J74YQ95PFwPCsw/gFLiGh

9yMDKb74HZIbKkPBSG69UvWBuMGyT6qypoCHVvgkqh
5XnatSSPYFsb+C+UaSk1018v7nnk0t9Fxguecj4LDy1L9eNmkVX9t2ecNoy/BpMEV24W8cXqnGGWkWt3

g0R7qm/oQr+/IWqbNDJVs2sbnMXB7TJxn/dqkun8sdG+WLToJMzFAkMbyFUxIC+p3e3xOPBf/rruS234

pTOF3Rnv7WBcj9/uxQzhk/WHnGO7wqRo0A0rBBTdiY
KLLhB9eHXNNRrhnSlJguIKYa0C4wm46iiNA7YUkeEThROZLNWFkJ8OYQgGuttewgFkXXG/x3wBW23ukd

B+jkMeeMtcr+fuymx+2uSVyJ9ylzyFdl0rpFETUMV6dJrH3uTOXcWeYakDxNNn22/3prxv+m5qLLoWMw

ghM4QJnW5lHKecCcc/o/xfrx/Evpulhnj4UYDg/75M
+dPg2l3e1DtLirboOyBd9/Mthh9fJUc3Xd9Zv8RhBKyI94zx2GRqzK5UpsMrCV5PqYbVWy8C4tRAOqyR

1xOPOCGOJqkwSNYpSYEP/VZQwDiS2E8iBSRIqk/VjwqtqVU6KOciHVQxoPya1JnBXFwy/8+9OGvTkJCd

PdVeGQWmxdH0R7Q7teVbYkVdY4jWsmTsESE4dqdsXC
XpFbdppPzvRnMSGwU1Yw94Zr5RaFpiMvLGUddrWD5V/ry0FEsXVzNsjP7NuzidW3ANW543rr9UWb01//

yRgZV/PjvF1v0PxeoxKnR0Px8TWSrj6yNkKrh0nLk91rM70CquxAmiIh3oU/esHiMIskpEqFiHeCqo0L

JMSN0VqcPOyRRTfyA+kzteHBM18aXjXF92fFBOKsQM
eVSD2aBDah5gdEWY0L4ahHWAZ92y3mWLHNC53urPsC6sYdppB3zBe7IV7bOYHVLAssoqXxjiec8rkOPg

rn+MhvxRFzmFp88/9QKUU8jmRbHQZXu37M+Adzfm8hJRHSsyGfE+s1xlHqmWlNec2ByoBAa5YivT+nUd

SpBVSJ/thYPdV0eOtB7vxx9Ne1Z2JByLoP3tdRf63E
gwx6z/tKXCgVLx+V/BtorGzBcnnYV+5yNkueyc4GcWCkhtkX2pnq+Gs1G2CY204Sp4P9xLO9A2/U/ccz

1jmme1hjMMo5+ziUfFzdtRh16hteYcheLfaisyufZe+82SSxuJp3ACtUEczKQmm2T2hh+MxF4/mjztLk

0o/vZWRM+Yf3Iq7agKSc1aifTWGC0JlvjCijeuD8VN
nakOSzmTS7+YjDwYjyoo1pfjuiJSR781ht0LFUvnLwJV/r8y994NLUVL0NoStn/+a+3Nl3CZQdGBSa50

do3bJOLHwRr22yB+RDXm51qLSrN3PAtJ6kI5/fdzP3xo+BknfudR/shhZHME6yAZ8M2dwQCTH7/mmpQ5

jtSuu6PJwmOIrcdHKjhOdUF+vl3oPRkqb7n4r5U2Df
+b0JllP9mk8MVIPg8ECX/19VvOl/7H/sf+z/HoK41UvUkUn+8/ldf2gvcQM/cD68riEKB7KkEnXtDLNL

So9RhF27YUuO7BfNkSkrfH08pI+Ez+a2KuWZJFm6NnfopkhNySKV30RehC10qs0eCQR6vZ73w888vgHy

Kji9kWo08AZvxmIwNQGEhDqrwFgy+478ZHqEh0yQuc
TJBf4ug3a1tz7JSDbt1IlwE462+T92bsBbXVFp56mv5snTGG9hFwjYBZbhu3A6mLIdzsfIxwn8DtcBQD

9lF3lLAcTJVV3ekCSyyte+1+xe8hWgyGAMqL5SA21o9r85EKXUfelKTBNEfnYq4Bnc+zKwOXhBW8CQJN

FfVweFus4pdBXSBLKPrentxY9HYkwrO5zZIus9Ppol
EcVmREkg+yZQQtegg4M3n0/RNSvRpbr5gAa7cGof1o+mQc2MINYGbtyzPJI3CLwwie8G0j9sY77dVKXG

jD31OXJeP2OVYAbXyGB7aRdAqdzn04xePV1bylrMFawLZuuXJJoH5gdj2TjWre495JaWLaEQCzdMsd80

HGFQkSv2VPd5md+cxDOEPhT03ybdNASFhyGF+JlYur
HspMsw4zsCsHXZq3cB3Z4w3bvE1P7kgUjRVnIcNs/yYcNPX7Xh+OBLimzkuv6gkpMZF6rZZeSSCbwUkf

CAVbSEjr9+STqipz5dcdqm4ExTP0mdnIKb46TFGCWWwZ9QvohjkkvkkhksjOT3t+pfSrQUMAO361kKnh

zr2OejpPato/DXdJk3p5DbbQM/z5gHCmndzF+rwm3t
wEWpD3Xf9N+T/uxQiWpijgNzW34HXD5HwKwzyw9UNBSNSjlJPoL69SAEFlct3qkUd4FacnhhmFfVAmNl

T6hjAFmqfV5LS9XzkJ2QsmbfCSONpwGku+8gZ1Nv/gBU7oCkGLhaTZXqiKfC8ckKRE1Uzj4ZrdOP2Drm

AjZeMcO3kHKECo6fXbnsKr1H+r193c1Vsi5F7rUEqK
SQy4wz6BSwxWD+oI/yPW453sMNHmHMbE2RS4JNh9JtkoYK4BsuDBlGfoXeGCGDlDPxXKB9NjhYQwKL0d

QSLOdp5/vQgrLWo9535YilwwtVX4TLs53viIU2u3wesrcUAGhbYO081jF2gMrO1OYQeant3CfcUk+O3l

umZGUKXcH/26/DQX7v2Pu9MkWaNx/5ql1sGPdfHVLI
AQy9WJ5QgYQF0sS0Y7TA7LePNkOLgmr0VfKhnrDqw1r/ISxE33SSHtf17fcyV2HG4rXO3M1qiu1FfL36

t16+venKUO30/fUCxJ718pFV0GhCNEA7RXderFO/zlmAeQUIAY5w8c27EFAmS1AOp9Jf4s0Ciy61bvxp

dOSS8tQUtd2wS8MEu13u0RohokFWBEHyzDlfvvXEJg
1wFjel390+/F7SPUCNHJviUhjoM2RaUiyCx99Fhkjs/9BwcglWDf0ramN2YKAZhQwBk0UgrSzhe4nmEU

lKggBVaZW3FDCTRM30EvbWhG+gSVCC9xYZ1GQnfmetAozBkHqlwMbHsHGNvnveTtogTk5SmFBQ+xXVSD

Eyie3bocpXgyyrNleBa0kEVYY//zhUUSqdvJwE6bFn
nYJMnZVGmymp0d76UgHs1PSqKe5M7ff3kgXG0t0/ZqCM3aEb41Rmgiju4EwREqy/AiN/R5yx4Au+kf2n

+yxD70CcwDEz2OwNl4WyC3VdT3DraydDRz87TIJ8ieslw6ttW9QGCP5vwD+03pnf5QlmTPaxNoMbc/gy

OdFhkwdjkAnETNTr1SDv7kcpmsTbJaqbz/KT9Vznul
e8broTa1wMv94KU9BU/TMSzNgMl13shkrw7mG/sf8pl9QkMMFOF/3Fkap433fAiD4hoEVLSqAytMLZOr

55ns8TgyYtNiy7GQVO2Pv31LmbZ3vGDUFoECUzt9/efZ+owRBRvPHle5DX56xnZsdTwytvNY+tCilIo+

+z5vdpmBjEbZOcjd2keAAQkiALsw6blb6PfJbYmkHK
QM+ZH/BPpDWa0LkJ5zaxgT6Q3hkZBYZL6BbgtPgWw4lXNx6p5BdzGGn32pp9McVsHNJbShffBPTTo1/b

Ht/1cm77SMwWGd2YdMuATzfkMmPlYuyMTuwKeSbdG31DdzThWh+GrlKhL+L1X8uHXOt9l4mR4uJ8zi71

WDr5t3GgdMWSMPW/ADdi13xfoERTmfog50bw+wdV9o
cq/Sc+a61njR0P7HWyzCBEiOyChb3+/wbozCFJVEMFLP46CtDLm/Zvzpkzhr/78Y1961X7OjO62fjKlt

6m93hP/LDbxgFMBXle+WxQFKT/NeBg7AIf2eZXtkcbei7kkqPwdkCopz7torZcZwek8XvqHZguK0MjTZ

9ZcfdHAhkWUblL5tRe26dpqNS2UoIyB36/sF4dJK89
+OIRYiKhKwy1hO1NZ8ejo2x8V3wfy3/iAaO/TIOmNK3BXzkNKbjBRamH6fhoCiSLxY7RoM5SYkE3rMC5

PjSlNDBVNwxCgvr+B0ROJ9jhqLr46jgQUZZlSyuP87+Wnc0MmW17M9Kbtwf1t3Tty1TDcRyPkXKmknvJ

bRqvsAFpobbiAhheu4RamD0wvaCBIYrV46GxH6QngG
+y/xOYkCnDgnyhErtmincoUzUia3Z7ogQeuXoiiWxmsAlOVqvJ+xJpb/HmxrRp5YEwXTrGcR+CvQ1WWN

EbNHf+AZlismGdB4HYfD6FXnCsyrqtmtCL2QDKpzIyb5HrCwlWKkl2KDMptPaQ9etcy2IfCifZCUx/Nh

oVRdsZS1hvsL+AP2qm9Qs5Li1VIwPR1D0WWXUAuGYT
sC3JD3hpTKT4GSCjrpjLbn8pE6w2alNK51qAx2MHi9FG2HokLEqGa2rclbfQ0Bg8de0AvBNdbZ3ImoGG

2tfeXZKNBCEyn13//uZP/BSAQ8WkPD1L+XUHKB1fX7OwfYrC31WNj+ypaCuElupg/uTA23fW30cuI/qk

3cZXmlaCFGM3Zbe0qeBckeTLz4CLljDQ2k5P6cDlec
iLNW7hFkH4peI98JpMEGFlpQ0Pps8evsb4lbnb6zdMZKCTBYyA7nBewMd/fb4MB8oUzmmRjhqDwNGGPe

4bmkFm+x31pt7zPaNSu8edjq+/6fvkMlxyThLdWNUAfj8IfWAMgTSqUTFC+fnPnYXqxa4ir5FPoMOSGI

FOSIcE7PHvU8b8j8r82iFPeiOsRtCnwJF0fqhWTHHy
t9xkgxv6xg7fVoF8AX6ODqnIwmQi0VRbnbm5YZk4+7AHGyzrVTZ4HsK434PnFPI2ANOcfGQ+BlEn+Ji3

IB9elZFvd8dyoygclvA+E2bT2IbwQSj3fD9YdFep+BDECIm7+CUFrxKo0O49PWCoAuHUEWJmfR1Bda3l

stPKeGIFRPLTawThGMhIlREFmRMudr8OXazeyloTgg
wd/yJJxRB2l8s/fGCh1/P+mZBAgLk4v2W8ybLPq24SEZIrjeUIcUepCqnX0RUNXr3G/ooyj6sdnvPKfS

BuUoFbyPvxjL6BZJSxeP91Nn8j6jbqRiqCb8U/C7dADC2a8paJOTCO7n52cfBVMw7n+mx8/Rnu5oOnVT

nDZgJaEk4AVbEvUkiUXWl1CE6E1eKSoovhzo+Ge1Mx
uqOWPTt4O/oT9znyfCJA2gE/JQv6crZLZuXy0mDYOO9XUXTzDd979Dk9xCk4LKRHs7s/SR+2n52gp1NZ

lLHCB2Ux1d3eF4eJuSaYmIfOyf6aC8BwPCyyrHlanBKzx5FBa4PkerBAoEV+s50K7hMXk+0LnGD9ZAow

LL1GG88mafwig3P03w6mVWMH5iZ9ut2AG4JJPCtDC3
D84D/WXMnSGrC7J1fuapGAokqFQ6a0KzzDB0ofTrkyJqJw2NpijfZKHT1bqsLLurYvoSm+KqTH4hDjzs

VM5g7AIqZLNYicdA4hK1ZO0jtf26PpfhVLkZnCAgiwyWljfutMqLaxiilJ7V3visBWdrzPVqBXKp6mcD

wRPNcb7FcGGMMdDG+AYye0RIAvRxlMsl/3IpifvQJt
o2cahA2eHWBeayWZZCR9JnovwkIWwdPJBTW0jvr3+dJ07BTBhz4oo+eiv107/lFFnS1fL5fJ/XbG3cgO

Xxwjjlx/XLswMoadYtTqznrJJ8obzciCStaY7wER6oErzCxurmwQSClqIa4NqARTZUL5mpIJyWDbvNiu

jlmTGowJeVIJ4KkLszEby6LXtJEp1dXOgsBUiL3FMa
ZdYd9dOOQq7ZQeXMsZXKlQXPDBT49LKCrOzpmM8FvqvhDuyNs0xtyGVManazW4Ea5jNczLM5uDsogfhN

A2pfzTa7YHocgley6Ud2mA/3yTOgh/yuSXZiRzqNwcaSCWq79WkIT6dMqlbj353wCx77z6uev69EsmKk

YqfXoDx0ENQQfr3JacIcrZK8pDXjjILvGOPckSmKlq
Y/s3s4MJcRcTQn1Wvjp8LeN3ivbu0oemumE+oTMn+1zUWOzBgDHDltFJN3g3yyM77Fkula7j8M7feHW4

uOaY7/bmA7zAsZTekOoLo3Iwn8r4XGcwQELW36pGJ1B6Oe+WSc3PAMp1mmDZSsSFgZN9pncphgVaF0Hx

XfGp8wgWCkqBYJKlxBva2kgHmQDKp+YxrWM/k2DKqD
PxRx9gEIWN+ZnmcFT6i0hS/LB4V3im/rZ9C45z9o21WhgOGX/BrOuWZ4U2nyJuzhkvtWgLjmvXKOmHoy

yfZVW2kqi8MW4xizABvC+3EOXKjBZlEl9Gowctcsd6rtCxkhYx9GMj19w8AL3bdHNi54atwYxD7FJ42u

QvHV6JmvLFHEklY3snWsvF7mI8vjTv7wH1uuktyGy0
TotvC8IHf6EImJtl4uSzs0692shQ3nH50IPEAGb049/vOPVWY6PQwwqZ9unNATg0lGs6x1vKX/ZORzku

sLVw1Zso/PLn+ssNnOK2Ygr6wt8NMA91VqLMn4bc0HCGz0lXCTHGfNFA0kYHIQjzOeAmpYbIg439hoAU

0lEK6awDpRMO+GmIGvSCwH1jBxbAV3fJR3Zu48jHWp
8f2tR4wKhQOsw8K2C4YIGejIvhEOvMd4wgjwKxuzxpmYyeRb8D53axm+Tt1SpjHoUrX6uF8oClForOET

fotK0cjGF/z/HwtfhhRivjp7Sjo7x5OcswPNNLS2TPda9v1anoB/HMDeq764T87gRRAgjVrwHa6z5RNe

C+ugEPwpA2y7wZR/Txh7D4tBfibTgZNZ3EDoUYSLlp
DAHhDKW9kW/fjktTi1nz9uYJ+AKvNi0IW8XHPoGLq9MV1gAx8oc4lrZkDvOPGHj7gEO2X1iao6uEUMUa

3qSiuLGNp0x+0B0YCmSsBDnVoiavG9wqvE+JdwvvdwWI5w5zrt/0+JSNgQaBlpqGASRREAwbePRlpnEz

FrBHkWysCpwxxoQJ0fow5cWGV9Y86imcmq/RNMuHRF
6sYNmrmxN+SYTxIB4KwBWCjuSnRaQ4EmxxFROEK5BC+tQwR0Cre+l46ukT625WafXy29d+3sVNG6QqVm

SR35K+iPXOhsWGszajdbVHqSAA04nKKdhucCA/YhLSdEa+Ccwn6KpiRrHIZznHK6dGdCPnEEGS9jOgLx

FM69dIvwSS5FvSIYkVs3mcKzrLBdXlfVm9Bpb4B5oB
Jcf3ZiSBm3jp2Dk9pSHY31JTvZPV1v1AhYnIymBQgIQ/yKRCv29WG1zXzqifh57DXUhz0gsHmlpMQ6Xj

DnUt1lgYR8VF0TsEcFBsFf+tScIcj4sfuEm8t3dmiq0fQP0cZc1FH6bQXd5pbJQ+4x+/Oh97K/TtC2UJ

Csipx9wUbKWPUyriilyyMUEYIwph4f8P+cD35WcZtx
aFJWm96+93MkYPOUkdtjavBXo2Vf68dQC75G82YS478bccY1Cm/kx1s2nRJWy67dW//1IHHhCFwVtb5Z

znk3A1exmIdyV5w4zOJ9yhtuOQdOR/M1qEFRAxMgKnDRuC0reVhY/Vh8QWHOXaF4d9z9MWQYuDVq8jJ7

EZk01b6HdF+PbO3ugvN7YiMLDtT05j/fbXmrpu3j0t
ArWRyU3CwIBf6YfcjGrQkovoPz/MJVPV1Ug+i/4nDx/eaCr2UDAWulxkUCUiUaC0+Cawdy/47tLXXvRi

l7U48GA8k80WGjvOr88i7/Y5kIXzTYcaQNevvhTCz/966Ur9RHZKQ2E891V07ye9MG6EVpkTFFu1UFiT

Fx8/NvCe8okOvXb2K+uA7OWnWzYuiRuFzMhGlbqUf0
awlQ59qMoOXOrrgZSj2r25DJjbCUQgKEK345Ci/LnSVIcwiWaiFUs72d7M44oX2kGtGq1WzJqp7Jofby

ZeWUZ2l4YtsJvePYF0RteX3+Nr4SNd+XqObA94YEHcS9UiSgJTJwV16A2YyvLA3TmUJp4pObxyzIdxgb

oh/RLJrw1COR4uHrVmpgeyqxzmy7ejyVm59wpC7n8z
P3Tftzo3jDOsN0HzjoWtWVcZu3zdIVLHKTtHT5E8lRyuhb/B6uD29hVM1960w3hbAEyTTjwCGRtH0/wJ

Co5sWQd0tyxSiOdOy1m1EZERWJyHUiPU+p+oMtH0JIobnT85c0CkHo2Kc1fQMtt2nz19VAsx49Y2wePi

Q/dHzJoHjo3eu3+V8/p6JVSPPrKYb24mDDfpBUjN23
8ohTaNYJzs0GtgIC4UYeE6CCi9ELWjMw+v+EzSXzVeERcoQfPi0PaTaaxhbLfrF7Jn8llHADxTxuTfRR

i0L2zKtRRbi7C86PT6xXGO52xDVxAuK2qqd6ILbdt3uUb6eM0e/3kDXGM7lhXMXhgQy++SYSFau3yfiB

9mjbL4eK6VaYFm50bISS/po8E8y1rgWE5vadBukteS
C/8hql6l7oZII3vp14Pp85f1ZbJAqFGFVKezDVhici8AE9EhuaSCJqldql2V2iMs1iK/wIm9oU/nfBX+

D/ogHtgaestTnGRSdWSrHVJ0ENAiaLLxrhHekHNCiSDPysfDZW5GpZbrifreDbQibBVZjiNnvgpq5lSt

c+uy2778f00nj0Feplx1934RErz7xE9t5bjfWFBjMD
uO+z3U5o0qebLyLQA/dIBkAceYq4ge5iCsfHV41zWiTLRSe7YLXS7TD1ryefc30rOOHS1HzgCTr2THbb

4EirfJTENmgtPUje0a8wyPt2r0940RcpVJT2XoIo7o2iCJt9WdMQFLSoRWvHheWtIaF89lhJxoi5GVBS

oF1qI8CHfevtPdQl/16OXJ24O43cXGt5pfp5oRi+U4
TWqgv21tZ1+acz9c15MsfVFLeTELzLEpKTIme0iZicZsnIi7VqzDiaJ5T97iYHdlNWkniXyToRF+pxS1

5EO8a/dYV2l1qGwpgZS8VibvORmCbZNjl8uwTwI2aJf5lGywGuv6JzmnMitcpIEW/6up9UUJRMCc8ET/

M0yvkgPI6TZzlI3yBeTQCGUBcT670HPYOaf5hN1SMc
+z5T5YAlKB67ucIpKEGTMxWupm43KSfi1/8WeKd5avnIrnqlLZoFh+Dy4A1f7dM8fVZ7v7k/jvqFlOTx

ux6hxrS0ftI0aPGS0piKuUFu8wx+Dir0EQdk1wIFN8oxGngrg3tjCGAbi2BEpPCScg/uW2Tl2lmixEuQ

nHJGgvkE6PdyC2ttCczR4MC2QxcNdxnfgFT7effYwR
IxEb1BvzYDJbyuyXTtpHP80HBEeI9FA2nfEDEOzemE83jhzCTHR3mrzNVWVaoU2WaQ0/jDcPoFyYcgEK

O+LsysLyGw7uYDXmmfggyZjXCBJo7evlRsfg4IeQvceFKR3F7P4LitnBGkZ6jh82RITvyNS6MkYXMeMC

sNiqGwtw39RzcHpFUfAy/54PBasFK86GorlgQgxqEP
YFF09vaAiLiMemOeb4Kbku+rVoR0lvjkeDclwz/8GXSD5t7xjsJA/laFtnNkEcaVL9dFQEqKuwqqH8mG

9dpAs01UXVRAkSb12F89rn8NrJPXh2wGfOUArYUcfodCq/d6Rysc2Il+mtb9Hisz1gyxG3SJUlRCxL0a

DefIneswmSrPuIHU+e11G1Rv9quGrIfSi78Ffja/ho
zObRvpwGHAEli7Yls4B7lFPyBWrZ+0SwZjuBNRAQzcg6/0jZnGYT1bmmSRVySG8BNkXs3nGdaX6VMrK0

7H0umpAHj4hD7FAxbcq1DDgiv85mpQ/RxittQMJqKSeNTvhjn7uwpyUr7kHIn8v2kJbAxK05sHXjEG2w

eCQUVnNqx/Pdf/wl04H5vNSczKbcOFLNbjYCjiTCvN
r0NqyS0r5jIZN41DZsuuKcRu4RTBEzXzlkZ9hTghq9Wxb5IUVFWXYqEySNUwMLiAAAE3wF3/WUFGPh8z

MTgjy9jfonTIQQIcEkxP6KXkwq752rubteBEHmJ9Q4XVsWhPP6Ax6ZfHP1mxA72OwvMLSirS7W+Zfq2A

u8tmOOHYlPMOxR7Hgo5hPPaVnl7l80uRPKgee7UURb
M5cU0DIu1bMrTHyFECU5LbG7aXeSAl1ttNE/zMoeXzoACd4hkDGHgjpLnLojxV0OYLbTd8D+ZvT2qV9u

ZBdL93EsX+rmzlTgJtVxmrb+BqBl0NP5Nb5QTE5epBA7xYBRF8JlygjLrQvhfeWI7yH8Kqx9ZlgB/GyJ

yfsX97xxK4dhPmnYkPRZxzZzV3qz7akp/GBw06U4NI
fIUWtVGNymX6gr+VbPI8iWQeodL6VCiqJNzAowyjkjTmBEA0mMqK3bvFOd9xDNT81tAGwtp6tqm+r9W2

BYDSr2MGzEMRCjzWIeZ4jXLFW3TrzG/+xyd8xQCqKra92de4o2yynkXcYi035MBtchqKeu/qmMejHpq0

S0ryepRMbv2wQGwOdp+4mk57ARN/Qa7I/svJayUBJu
Djx9hNIo0VAVOU3b1qtj6386jBjXAf5nJ8ueQyBjtyi6rT7Dxa8sqTWHljptfGK4htEs/6qM1Kg7a5en

Rj7KT/nTZxXC4AP/vm/HajPiHDOpQCcitBElfnpA75jSysj5l4YiVwh6pigkUDk3uWWfGkkYRacVy8k7

pIb+kjJRghpBYOUVEPoXyT3gJb32BLlK6mLbLSK6XS
TUEugd/etdUKIyfSh1+ATVswMUXkdepssGTHyQ2TDckgqBycnFYNsLN7w+m4/F88R1KJf/gMt16pZJId

ppUiAOBygAzHZotQimuanhmBIhmxBV1s6ll5Z2v+c0uKGAhglpqsI1MeS74NmjpN3RfSv/Y+kH98xt/9

G+ifQYWUqMsqfZ2MbYboVNE5sO99eZkeiwcc+f5ScC
Wekdlx3Fz/dVDfPObkCcDNIpURxq07t2M6rn7ob9vHv8f4LNIZJ5ZyemMDMxOTWMgSb+L6uT+EdrsPBp

i9caAjlK0adejUaEsJ6TCcI1B+5ah6eux3HIbiIH6O7HuGrzGFpF8Fz06uo7pp9sTNN/ShKsekC/vPY7

pYSkvXlVm9H5YzXxpIiY3mC0zrw8+eyNg79tmXBOpz
UyqT6g2Ntt2aBR+tbT/x5/j7/H3+Pv8W/D+SztEiPYP8ciFYlhxQQltd6pixi2BSmPZzcj5oRgOnPMnV

VO3Lcc6OAetgbctKaFNWyR0IO7KG5JU88TsnaEzPL7V4p3cfVR+LrrAa1Q8tYKZjXPh5xeQuBeFAPhVj

C0Cox2X43d/mBe/VC99LcAZaYA1UEp29sBm8RlZ2Ju
5u4Kb5ir4eqWsX5F2J0ncfv4cQTB6TgsB/GkgUFqynMF8jsmH0Oj/MkEvQdhyagnnz6Msg0ruJIvfaGT

muQjSeNdwBb7RHAO1ix6+gtgXdOrcEQeOQPdVEwvArOjwWMs7P1Zw/jj+w4aThRhwn4bMlF5M/fPUPVZ

978MqW5yRYEFKGM4Fs+Ox5HaDozTuH1+pJNSlrjQ/g
MS6cLRjzdpK2/ci27XqbJ1QzrMMnvJu5Ix87BlrT3THxSRu2v8TB9q9DEf4l+xyYvTk1RyECvxAqjvl9

IqhB/+wxQ7ZRu9KPSJHa9hiHx9x1wUaHGy919u/wlVIN/iXGHbOf5behKYGk9FnWR0rUrRM4qQesuAsM

WOYg9lNTRI980aEj5OROlS6Lg4o7+H1Hunc877pJ5R
8iyOG4L+bjcAUt/GkNCicreBdD69alTB2Wve2yZu8mz4N6wZrPI+NIgusJKjvOjQCf3BvgkDFxEItjtz

7PxhHALx6cOF2S8/L3usc9FKvKVeKJ33O1X2qTQLbxDVVvrwFJKcXoMNhKT5OziZS9Voc6098Pd4HRAR

qtVABJ6sV6c+XfAURpGv9+CP+gO0a+YHc42kPd9MrD
yFbDHXcoAjKWsL5HEdWLs+UVhm3POyZr3yoCJ8eh0v20LB0os2+ism/j23RfLA4yp0SeyvTDqtJdmFlh

ibDAHggI6RT/KN7lAkv2fL7QB+cvJA1pMSgqxBvTrSusMByZb9mp0qIqMTuEmf78OH+2rEGp0Mz2ndiV

zSyal9SktYW1ffiGqC3sZQm5DdfDyDRgkeHB/b5Wvd
lWH/vqLoq+KFLk18tjxdF3lttI3B0NOP5FMeYOJI4DcgV1QS1B12dYu34QVtYoGrMCuICBqlsRf6ImIC

zkqrLR8vTT16/prCd7FGYMbaPvzpVNihvK7TzFaAotawdNPjNTXEuHKVyeyXKNp/YGt0QfU5jJlu+JVy

aabnPS0uWBXWfSTNeClO1XKGL5dUwUVB5DVdEwbjZ4
ko/WOdCHIKveXUfmPEt0IOYp485NBlKaM3YXcgvITZS8oypLJgUfeW6upidFnfceFve+Yb2oY96EOEAk

zeSo99reJoXmS0f5h1iyHChzslisqA2nfIhNqOwNPL5DPaQWirdYrkiZhPtj/rTw5g947fvNcIetETHu

HKwlb/hRld4M4mmRfRe20J0/jUN3+JQ6ynMg/VtNNZ
cNLVpodKlbBryhUy9J4s7Cugw2d2pOoecoHo9fQVtKT8DZ2/WXwNWYwvEwg6ylyjNQVcjxbyyuTHmnI4

pLLNzzhzB7xUkPg4i0wJ4on0/x6nXdBDJVKIJW+6r+MRryC8iJUrN6gMOffqJQ/UVF0HB/S+r+LE3vF0

cRob1y+ddULfkVUT5+jYA/6jHdKB24lfL/yFsEBZg1
VZqqRk7nlIC+x/GqdF5glGPtBT3Mm6VRDj7K+jpk9mpjNqh+8IOQms8kTe7HDnN8Vb2vAhtMYJamDMlU

fyG1QyLWZM94dxM1zfWaWcKChkNbr1+/ArosF0ajlg85YfWqXJRy6Tl37oKX5kSf72P0TKSZgVXE0/wG

Hh6RwaDD4XScKwVZ3bTjO+Ac1x7l84Gp1Q3ZdwSmm0
JTYwn7Wl0tgAU1JPRQhinX7kcL4ASgB54PmB6tnOLDUloX4UNz5aJrMw8Ejdo1AAwEXqRKI+tzdmWqgt

wiqoJ2YanHc1MRWtB9MUfMIyY76CNViDzzKmDyB7MVcMUl3gTDtm6GWtY87uyHU+XK+FlkpG5mvRUYQK

Bx/c79B5a0BRXR64PTo3Z8upd81ujP6o44Ci5SGcbT
WthNtIiZOLVwg29UUofgGpxmVaiHJ6hVhbM6Ge20o6Bwqvhtbu6dFQTGeiHB/rfFgQ+sAa0aj4IrPWmq

RejPihkMJS/Sl3mIUcGt44EvflJ4fhqx5MXp+KwUPEtUUZmCRoqjQiaGw+4AC8mKnNxhfUZsZOAUbxTR

H43NPhc1jLxmcDuGJsE93RtT3DcHbOoWsjr15yS+yT
UvvU+z3AVW3O+op0k0XK6tC0rlqMiLY7Bb7t1/BGmmjBH3VUx+hqd06r0taNyyyU3OSeYDbSO8PU5Ft1

cjzMljpy4gHI3sFL2tA3KTiEbyqu3A+1pphkLfbfaD17techUJhKfBvrVFLyUv3ciGnTwclvLGBq3J5g

7cFZnCfms/83bpvz/7w3Wzz5OJ4gfbJ9hcba3Zksi+
ppNx/XtdifRoettFLCfyB5ma9H7QwQmleLQP8KxjFLJm7wZkaEyzVtCXmJ+ueEJdaqo5FI0pUREWiMEz

euzFznsiPcSyvj254fgJD+QBNgzSg4V4jpZ5aZKLFsJWzbuZBfivEd3VeBQr5XahC47wIchevhUYETc1

lq/0b9G/Vv1L9R/0b9G/Vv1L9R/8g4o6DkrBM69RsF
jGbM+PF/DVAtxoBs1FK/+2cDBF2JloGYa5WBRY3L1rdDjFRuYLB9WPrCsYFFiLRJNNzwngo1OVg/HQ7w

fCVi80oXfxGQhdxz2av4cfKEq5GA3tZ+9Te1qh/U3pF01FzmzJhoS6jnYY+lLq0Tzs3VnDre/r1/Jkkd

d1aROsowDs3sYSuxcC3yiaL5b/rC+jeMQDCm+U8Yw+
j7+Kovzu49z3bPs7loumEPGHPNucFEGcC2ctn8fZYpSmovVm+yizrw0KNG3mNLk64nQ+74Mf2+owhIfh

4VQbZBMMzBfO9+bD6+a4QUEkM9lX6bxy+dcCwHSTh7wunIyyqyKPScYujOk8WrMeo/aO/VOT8GKdz6aU

OnEh9oqU/nlDvL2gTkWJqvpjqfpgyooO0Lu5h1oqmo
NdiIfCf5l3tLHu3slFgucIQKhGucTXvqgkWfzLeyKLZLTaF8BoP5LSly1TkeuexybqQO36VI1BpeDJW+

f4kenwF4+qZyc8h186tTiigzSp/ZMpQB1KBuwWgvv9hGrL2f+EFz8/Iy4POHG7ugjwH5hIh2w5VEkFGZ

mLTBgd827krM3sg/FcZr3DPEwetQJVPbQI/x8fAR/n
XDdUWtBO3qj/WA/MEB5MzjMy2I/cO0q3jeRl2It2bXHnWIXEGtzsf8HJPh7qpR8VnOz7ACV5c3NbXNd2

MFZO8BMT2em3wzQciduvhV9I0+DSAAa1hLNXpvHFyf9+i5Bxc0KzsO0uVduq+FOPnwvNx/3kk/VNzpA8

1HUo38VtEzz/4dnESTJb8Ui8usQDxjTY2PWehzT0KS
+r3EWflze5HrRbJwVM7DoHZz2LkAtcDxL1c6mDI6ocv4io9+Sh+1S0G48pDzMySTL8+Ew+UfNdViLDJy

snJYLHjSlWEHQh5bsukhSNntpN6/otloiLPxVU9jZJ2fX802rRGCkHdTJHqVhl5mOfT0+m8U9vgX0ar7

sYYoe/1u/NTkEgTRq6vDVQ5Ly/0NTuGtfXP5kXAfi/
i2m2Xz6mzzsjGI5luKuGvkxQDTlLs/e8DPG6zdV8jCW1iRngCXySJleLILYzE2BfLqhZWUdRvDdt/nE4

eUnXUIgIerDjzuJFS/xN/bQYkenH/PwPbEqAy7IWp4D0otYRTv6AMXE6rOfrrcUkguqNwnjlpyjAcphV

cqTJj1gQ2HamEg6ajuf4hiFh99anqpXyPD9sifNj2Z
rLCuo1rV+ZiL+R2D7KrKQiCA76I7xAq8yM53t1VGe3lVtCgFZkJotfJV7osmt7lmAwPt+bIhR3MUtjiV

nF1AbvQR6GfeptU6P4NIYhpxmbJLXEySRZFtTHlw2yOJucp7BR7LPoQr6oukRrtlvKEl0hgleZhQVHX1

i3/OqR/FKR2xqKp646P+j16/9jxWSI51WfTePvXnXx
HrK+xdUU4J8twuEwxTXZr4rbBmnr+vGGUnEmJ0hf8kZrfEuFtnhRTaASl8PjxyOkZH3QgpIKNGvPpEEn

nKMQM7wb8XK2uupof7mWj+r2iElxz5gArFnR92M8hvUtHql8mFu9VxzM9//H/ZjLzJJnEOinJUfQ6j2U

0WoXAAXjV4lgbE/OUGEDL+ucR18CtsY1VoT4pACjsm
im8sE+NqAZXv7Ian4DhJE8z2dXqqpabO+JHSEZHK0BuX5Ie6/5t1zpD9PriAa/X9x+Ita5N78xddEjKl

VYziI1WlDJDoRiUsXplNZ84Je7O20aiH7gUR/csPmiKDzE53iiTQwbdi2FvRndjq23Bmit0cY1R10cgT

fOJ37eA0BvY1++iiJU4jJUmw96bpZQc/cYdbYvIQ9g
mfIjyz+p55gOsQSqy5jTEVJPgLpeA59xjrN303psB0q5wh+2CjfV5Hn936AUCQIsn2G2DUbVRdlpUpzV

TKp75E5Dtxi0Xd7HCQ0fhnL36rsaB4rovVUWNBPvqsG59FC/6/9neN1+CmulZWvjH90tH1IcjVOZpxpr

oB1tBW+Zwtd8z6dWW8JLyjyfVyi03GN/iRNHzJtF76
aSPFuTjiRO+GibRx/rGtGeaNHAgX622xsgf3FNslEGTby96WIFJV7weTTsyvftMrEkSczQ0RPv/0ZwFv

Rene+u5+cfgenbrn24z/yMa1e/AIipa0qbQyJ2tvW68JMg7hBsISolnt5a0CVFeA4j/4HoQapmM3TB67

g0JipVJv0sZD77uVjMKgPmJ2HDiXYlnHVQMIxURdl8
Xj2I2E9qZlQOOjDmrUxaJ+rl6xt+EHx1pZGfMzeHZus4ld+rvuz2fmNsvrVFCV90u06u6BgGB6cDlO9u

cCgNPXy3+Rd/Qz4hhWbFoptC41iIJJ+CNVWlKKA05hmrEaDCWKXlmUL9YH3ADCB4d+tmL7xY9Y9eL287

on2KwzPfV7krVhmK75QbECmYv2zfZd8Asp65ciyVkw
2/kdarZ9DIsf8cepInMC5uzCQAQSD32iWyJtTV3vrOkACev90U3po4KLdCJ/gZqgeIKq2SLZF6KDpat8

CFNlmJwvDSq9rEEPKSndIYVISUUw82lSS/0wCZxkw4hP73Pal2tAwBo5BqWTr/bNZYqsHkNDhOyHxQEB

W5O3E7yQ6G0zJ34Vgay1vczgpT1/7Ajhpzq1RqzqaQ
KkGHc0jDWiWkLqkR804kiagnpLPcfCY0tlz/3T/NKfNS0NJdIuvgDJ5GSqA5FO8WB9L+uT9yckPycCmm

C+1gastn6Es1lzUhj++TjV0+MbwD/1f/r3BCJVCLUmyJ4EGb8aYOEB/vB36+XHYEnbtR4Q0TL65+pL4T

H4UmH9JcItr2jYHVdZYnGcXks5Y+DE5a5Kq7iOhFk/
DHM21GLtEtHdzDSc2H342pp8S0DnLZqsc90VvN+ElAyPxFzY7aGvackHOQfZFK0LVgBuTVSU9oM7ILwv

YzvPZrGk2OwuXC6pYFIc80figSKjI2I5xHgBx6kwUMEko8KDMk0ITK4du0dpMVgSSrorsx4mjbS/+owv

oKDLCYvlNapJeIQ7Fnt0ufFVXfkVZ6d7Y36WbbUpmL
ixxe0eM5s05GYHTe07uYKaHXxCHJtMXACfkaF/NGAiwQ0UE2FazL5kgTiRO7BxpdqICVv4VEmxiBokd3

XeUWl2tlcrr7j4VsptwUvexTBNlOyzCcRHo7e+7ZviJa+1OUOahUB8i0jlJdZFUVVzDiIPBzYK4ikxFc

pSnkYLQtoF1HwtVnG9i2+PixjwVV2dMSed6Wxp9Nsv
ixPv0Bh/XP9XZ00y13KGhbMf9lH1esLuFYnxND9l6q5woAIwZgKNQfrmKVMfgWCQpgPdJBdvopNxx3hm

sICcmpAGBttfW5JNECGB7mYawAh7o1vUBFfzrtn3YWSfIdihUZDYEcIoxYc1ROJm3IkuNbL8B9kY5zzg

9nb+gwfh/u2Q2MARl2fl34JPq+Hj1Cu/uDB6SQr5n+
ftE/izNFH/bl7eBj59HI4JUuP+YHAJ80c/u0tpmp6bxwClFnf8Cnqf/vfFhtB2KUFhPZ+apZsAchLKZN

rir89v7JiTzOptQ0Cs085T7oau3cadxCotChI2wmqMjnn8ihNPt07qfTeouT0YEdNoC3VKrOR9s1mG8N

eHwV/HPut66Ykjstdtli880TvjinGd/tcsX+AowOL0
AzsQHjI5yhWFzos6t2YFKaTzE3zWPvIfMQRmyq+oGnjBn2RyUUxvYfgGlaFz5yoqqWH16+hftDkea1Mg

VMhEA0C/z/B/0TAMsVXqQJyakczHb64qTtEENt46U00a5yT16FhiQWuYIZCnhjHmmU9homFodWOgyOlY

vTc9aFq1A/+075GEC7QYCrql7nlxs8hHiVYIkgA1uK
OVR0c0wqqdwowA5TaQhsjV5IJrEdAwkJUqKlk5lK/up+PEPEc5pQxnpkD71cRPufg4AybXb21MvenSol

l4N3oJEpt21fQvx+GDWc2wOnKSvzIwtGWR+iVv9SWXjM9YfKK5V3GdHhS5GOAoUTN+son521vm4hwiEk

CD9O9vw68cnp+e540GL5sFLwvj8jUHneGVDP/BdyNt
nl6W4qNa1u3sPgaFrtN9dOxlYmd3F7oHg3liRMJ2H/OGoKTN4qo7UMvSu9qnIEjcSd/ffK+3wUpocq/I

SzANAmdEsbMadBMs3oz8ls0++XKvMZ3gl0G9h2Cdl5pqJu/4kneWB+CCyRLrk917yup63vJSo0q4bjk4

Hpc6AlFaWpoKDBYbehlYrh5bEDGX+NOjbpnMGNuqSN
k5s1pmj7tP07NqBFBLxL8c+U5HaEyVq6gzzWsLQeua5aTIHeYrSrRsKFVJJzW/gJzxWlsElmkvFl4FZA

px1aqEv7nIDnqNFZ24iN/ubj/NOcsi3lDlLlslO+a95SsjYQO+ijrqVcoiyEOe3rSgLAOz89n7mwhQ+u

WR3vutmfWEWg2SStm53rsx7tDpSORwo+WUy9GQ7gS+
X3vzJM0XsXWRwphMpU/LHYCvKQZlEMCBeOiFyhTAqonb/5t2sq5LaqGgoZhR6bmualiNXc12xduNj1TG

8ZzRluB3h5ZaFmE2hsYGSuoUdZe5pzkv8+O9LWq7clObn8Ev/hGsDUBZkLOjbGRFYWKsrT2mgyN8mlk4

sED54R65RCBJSgPihE0LdsgMkui/jxIA3ofPu/0AJd
OEiqlBWvdwV8QNY10q+iZnaycoyq3ghR056mjagJHwd9pgI0F68KVcBiQPBvqL5K+F0RFk2ooik4XtPu

a4tVadzZv0UCx7p8Mu+sCnh4zzRfKGamHocY+m2pV55bLq1JyLG9O3B1QRn+fLGNrtExjg5hbOyUSF8/

5f5NcmYYQLrpLu0VI8pW+lq4orhnrfvK1j9uva16Mz
fk80aHts/y9lh6v5LpSSygYnGg7PKvUqdLrnIIhh0URAG+GXrSyK+dxGt/6tN8cP+4iP6d2qodbB6wEY

yhbS1XlW2bSRMCghZ21/lTGLCYWgVEfae0sPJepK7ZaZCjZ7IgAHYOvSKHy3r70nOGw/NKiPZ9WUeAjM

F/DV7vbUBH9zbzQGBKrL0dqBqZdr2G+dicWHna3vL6
p4wtX33dwcLACpLHi+Rd+mhzgZ0zb61oxgm7JJN3EFwtCySwLPeyMTOrNce0/JKL53Rz/MrxpSZL65hM

PjC7Zu1CTWbqJlq8i2qM1Bi59ppGkzX11amCy5aSvp49vESGQ7ov/nVjVcT7udCxu/6bUMK5M+rVvG1p

9wCyPP55fXJxkW+StkG1ssq2GycPonN6boYLJFBVxK
P0g9Q12Raba8hR3AsZ5Tk42PPhNo6DPOndcE2lHQCqc2OZo1asCCEmPrxR/27nyYd5XFjpKZGtHCbLH3

Xz645UMPqk8VyMiL9YCoBhB+S/ZqM1xfKWJeYCO3QRRwPMVyCWJvG9KVQBibShZXFdWkvc6+JrSjk6jU

rL8st0j0qSLxQkNqbObCoJnlBz6Emz7U4/lkQp08to
M9amHu0SxUn44JRsgZy64Pw+rXIY6OXvXbVTLonIFsVbIzIN7auubxv8oof5Ri54P4bC1bBK0ByzrrUr

JF5TSPC7ydVGRcHTef+1ul4mZ77Xq9LyiMxLtW+s0NE9In55e/thvESUGY1JNpvAfRFXFeZynsiizCl8

OXjzG4y4qzvIgza8os57gz0ScQlWHLRgRLq38cMBBi
D3aaI4ZPirdlrjRiBBr8L2XkQy5ev9DmJLBvXOHNXl6AIPqZdp/I85rGlrbPzPzR/b9U+XfM0tp09igZ

G/Yhh1jvKOdN27NOlTUGR1xIsmk3E1ByoBGdM8MFeBz3l0Sp2NwcnvS0Vt4qEPZwdFU2dUcLvGEJMyzG

Gv7U2m46C9HCWs49X3lAVBgZhvKvr8lrhBkb2ucMJS
mJPwUGgGzDf9FzUzwYvY591LOYKKnfxwX8dhBZgCamel6SaNV8+vO7BQIYirCCtA5kJbGiQipTeyw3Ak

EcAMAQ9Fp5mov68PKLn6IYKK0ca3/BFWtoduie9dLE/Ew7BoxLL57bbZcb0XvfBZOGk5r4U5MCb7sMOs

cAXO+aa0fgaYngBUbDN8W2lMD+EBTaXX+LyRY89God
5f5bSKofZi7GasOhBDecFRjXnbh9VC0qjheQWiPc0XNdFa0rHXEnYa4QDvA4lZtNh9FZcI0tOPLrBJF8

QiD7CFzG2GzSjH8UKhs2KgpjzkHhyTttDVVIPAY7Omo/2ObE0Hdey03vN151mYdG/b6yCSRhKC/ASUQs

tEr5ZdMuXHdW1B2WJyn83MgPrtGoDu806OZQJaXEhC
A5XjbP+jIVjdth+F7wQCErzDd9d1HCZEIS3paH1zvPmNz6eLD0KqxtQo3p7WmApw1fPDhTT+uC5teeoE

2kWL9Fha4uSA+6hNwb2RicD87CxJP7Qm7r+dRzyQpX+ISJ//r4FgXa0edukIlhi4gMiu3ytQtX9N8354

GugOjXIFqcYk819IwpFwNzculfe0nyUhbhpcbPhQdM
mtMjYX6q6wvzgvkoX8T2bcB0L0v4laSIzZQhdwyUW/wjCESN5Q/Alcr4592kMnIfYvNB+RA6dkrtXGzz

94Nqfx72x2am9WuXp6JgjxnJtDKeFRGr3F6sGlNCoeaVGx4bcez+rQE4FolQZAk9F22qR3YONXTdC6ud

MyRt2KEdIiHnSHpo/76uLt2VIvgfOyDet4G5WV37zQ
N+wdPRjSSFVrzd45dqm/zhztfmaNMNvqn0h0kK9/uQuHTTr4rKA5pqu5VgVc4WaoYClMBSqRy6+fFNQz

EalNI9tKHvXElkmJDGYDy83WkHLIPf+zT0rzK/sn6e/+RppAu6XtO1Ejie2yvpYWaKDBFOtZewf1vS87

daAJ9/mJuK+AA5XCQuZTb4QAtjTRZzrt9wjoGrVI9y
pRQmm791OXhdMXA2CjsrO0e470vgr4s7K2RXB0QmnWKszUfqoJiykWocrTEcHITNJnksoK/vQK0099JX

ylbjqFylcy6qihnHxIkBBWDbJAL5a7XKcWc/xGR420+WYiv44YyxHn6oHZC1GO7VXqDbw3t0+oy+gWaD

3RcHPU2YA7IruIwxZ76QbrLPatvCw8A5YnRlINTAv/
pE0G0sqdE6TGuqGxfBBKA4OK90p9SU94oP7wCuKq2f1NLkIsVfVEA+y8Z1MK+lpakOaEWh9AB07+369X

vIbmB5uTdtBSe7bzb2GAtfZ2qa6WJn8b4mbiEvgNulQ6RJn+E3w3j/iG2Se5wP/5lzyt6v2Vf1DKrp/v

KQCusroPwDsKaVQNCBoxDiLfZqQAzM+Uog/ttQTfpH
7SeA29dlAx58ag8cIE6zdtyt9CTnzX3qLARiFf41e8yEoJPfKYYVGQq+XfoKDgSy7vxqL/hgJTnc4POW

pP6oKoFkdnSjHcbq322/KVzAvreEqPUZJwKRMVbyvXNdT29B7VHJHnpMewy+VMyPrXTGA/cKXHUMS+yP

ifcGwZYu2M1gKgmWcXjNz6qLQiR1swHvd5KqWlhcYC
ppluVQBxSd19OMf79c+/Tw4G5BDw0QqKylC9TH7q9qnQerBC+/kJHth6o3l4eWmBXk964ERBjK5Sjcv7

H5CBEcn2rUzZyIBgliO1B7kZ4w5R2qUjcjfxQKgsHgPAaJwToKndJcs5nwxdPGC53FxzJt8qLDm8BZI+

6QlDb7/wL1gGgwoseLGamfIihZj0c6RuB5hrhJ9B8y
EeEj4VVjbVwq0ZOt+rLsFUlbsh0dVouhjqDb3qPe12Q07Owqx+8X+m2zSPl1m2sNTNJVBPFnuUfRz4b/

7aLrqHe7rYuB5KyoPHpH3aMc0zhazSd162AsQTs0hzkj4TaeT1KO0w/LshSMVWBWFOVSfM4sasvX0FWV

Il/NtSD0/b2G47c+HgUXLefXdYjSFQ96bM0/1a2+PB
7yKo3xHhxvJgcRHaHz+TAEmzgO0vz4/PHvJXj1Q5eq1ci4EfySI7Qx6rFMtO4a+0HYte62K78m2CaB5t

L/6wy1I8lhfofygPdese8dcbD4SgpQApcOi46A6bswIJHLNqvrf7CUhPmevvjPwI4OL+1La7ejq/GBS8

BI0geKdhSMmuVWfXnCc1+SuOiiNx4Gn6FqtypNNpJD
0wQm8p7XDzUt5xk4rTMyeZCJO3RKhm+q4wUTSdDv7vRZRKzwdevEKqB1W5rSMUArUo3H18u7Pd6/ak5d

uSDbvvton+66U0fisU2qXT0ch6ZyKHCnHinLgP2Ah7JndSqQqd/C8SvOlWKaQxAiE4DRZeKo0PEUiOsA

W3ZY59N7tkzIirDTUNJ4lC8OVram7dMIxzocxGUih3
bDmeJlG9AKfgcd/MVeSzKxUYW+wRaV+qAiLzbr/EXFpdZam+o7v9T8M8EfIgzX/e1HEvEDF59eM09csk

DqzlxXG6gF/SLY0q6t4npCWieMDmzUgtlr8O55h3N9oXTdBkaT078DihXgQTzdqPDW+QxjcpKuXdxQ7i

FLZCXrcKyqdpKK2DtT7d5XpYAAlslxF7tPlqUdlzM3
RDonNlVsaW6A1353rFNwWaShPY0e1LIuwIiAip8edo6BoCeLp8gLubis7JcPEB2B58pWb4Ww+LCgmULx

Z5JKqmmyp+7pvm1ap0gKPunchH+RJRwyvMetUeTbP08dPkIzRfPPLPTwGUglxyh8Na4X5PXfwvlrmWVT

pJRT3Q6mkHMuGgZJ7bl3JAPvH36SXvAFeKTLyt7EkN
/YYsGrsLBdZMvzZk4+B9oA925Een9rZ2d7GEMRD1ZS4Bp37DJzupzTdzGa8fMZ97E6mqri+yVSBCyhMn

CEeLPpNvpJf4yUk8eiYhs1ZNh0d7I9ul+/YFkgXRrq33xpWFL79LMWyKsoUjdKC83Hg0kI2kQ20pKxNv

ITXEHNSWJeatJryCkhcal9rVW32yF3TwBdJ/c4Fnun
leP95ekUCMzfw0mffpkzCDczdAHMF6QgHQ6BGstVbUQ/IR8PSf+sGQnnXspPZnpGS4KhJIVFWQ5OEMrz

yXcWWRM6OcvXplDMg3RiBQ/v59Coum+yPpmCOv4vOxG1sBVyrrXYpWtEYnwPPOlJ4L8Y79R4plsk2a0t

z3k/G/b02LwYjswKkUIaO60K4QfeURs7L2Xyu4lIk0
UV+2++JhM5lAeBGWU4fFpVc8VT/ErupavEoE/PTXDVuBMjmnNIVvCgwU2i3y/7AQIBXV+zugzDeoT/PP

ZkuFoDlNHnr29xKJZYfAKPSPDpoWQIkb8n2Mkdyw4jP1Ayl9tJQyKmSW3JrGC7RPXl+3+hM+s41uibS7

BjyG/Vf99QfAfE27/pPeOnTuEagX079hbRD27Gmmk0
C07nrZssrfdL4muFUxMiY88mYE46aa2Di5bw6ngJ5ceXE6kwqRAS+GXd6qkPCoAYvlqHE3uqZtlRKrB+

VqFXZb9+XEvNaoA0LLQ7NOm+wFpob4i6ET+n6DHcfunjFVsuYZn/AfxwZ/EoDugV05D99UzvaoOr02Rs

JpMKUdKreIPKqn4vXQ+bFtZ2ZYeC5oMq5jXQs02ba8
/p6SzMRRenVgeWlvElGN8SXPefLVV1jclkicqonFV9+njP/QqcE4vo+Ndpved3+dZ8RM9MdF05Mqd4n3

036JL3uuErqdPGzyp3WJuFHgBsqcDVsLP4hz2CfxyeexlPkJZrfrrlh9W7C2SB2/CGC5wwAvpXD0RmMB

RGds+3Jy7rYJ3urNp+jqkYZDjM/+yTceoNKSprNP8K
5ccY+kuQrgaUQ6fCHZEraGONuplL7u6K/C3fr57KaLt/ii1FLH/wWO74MBKa9u8LegPLkqPW18g3H5Te

onDtFLMhtW7a9B6XFSQijAKtrU5Qi3arYf+1o5SKEJan1dATWM58aKur3pTkx628PwFiqaTqieHS16X9

Au2ZwrxJNlTcRvEWAWeyYoQuB6p85R06ORGwqB2tZY
D71YK/grGmxHhyk984HD+OSIpS8INYRGi+0uEllFOU6V3mA4OeGAiOB8sN9MnTZ845KInKpFI5JTb2es

r1rujaUxbV99KOwSm77g5LoU3n4bNbiQeKnenMeHvqT6xvnLa+EuWCZsly6TJdoVrLuK0xF3E26BteJ5

Y+E/eoKH1JiZ24Feha38Z1kbNIcJnlFrKr26lA1coQ
NIleNiO4oOqWDTOURSpGRv5h63FJKLz0W39tXEKOEb2sUe01QZ+pDGvUJhzptobOIUSSk+xN7hYnVuzL

EpSJi4cQiRGHG7DMirgobloL9xkqfCCaTKzuMhUXbEV/2Tw37oOt/11b1z3nuk8/ENz0C1pFMS2taN7k

1SZ0jjQ6SVxdn71vj8YTRmpGBMPHVtm0icBbETImVs
Uqh0C6Fu9aC3mRLZWC5h3fZ5j9GzZWmHMmSrHRSP3mpBxWNk5lzAOHh6Z/uECO2cA+U21qRNcsiNFdPZ

x/VXtDG98bSmTgz4JOrh9Ita7bBos3y0e7BtCsR4VyWAxPxDWdMd4r92wQhlEevjEBhm3u5uUmEsT5D/

HAfDKR9iQl7//Z7yKEWAh0LuqrFGs23ry1tGr+dEz0
jYUNtY/KN8lGZyQxmPD6Rk2CrZYXn5MnH0HQzErtAkNKTbzRAEIPFwxu6vjxXpX9EYuMpz6k4ANBeB6U

5NfWqqv/IUovyM5BzwTCkj3qqj2OuedD4ejxNjjZv4LdXJ1Af7ycbjUP8Xd/PJm4nkE1eXnbboG8wbqV

mu2x7fYgKJXIvMD8ZM0zfI7hY0SWb7BDo7/fQDnmAl
nHoT0xBFPTcKmoDziGElRM/siIOP244+z2KNl/i2Q0k6kQ/ggBgmyew6sNaIMVG0TC73k6pJ93rkbe7/

8eDtep551e56K8KC/IWK7DW7eN6WwH/IEoi51deT5w4FhSclDbvHpNydUpeCqjrCqnaPwqPKhF3OXd6i

3qJyPIspoDqngQ1FIZaKRh7Es+VPb0NmOrMZMiD+DO
yshcgO2idbgVKqsdfDzAmAzEJhAM1pyluhIMnooD185QWjCEDJzgNQANsUX/bZbkRLX7HlcN2O+Yqu2/

OKOztxx++sXQ4BAKAeVAXAM/fZ4KOBQs1sWvrOpX88stJBqtIH1lWuPWPMnQyw/ifWlbaV1GXJJoHIPT

i0HC0LrqcJY8wMDheXdV6Ow2kxiofhKyhVtn6Eq126
ISuALO4OBNoX7Z8DZHleS3B4Q23YYcWnbD4LtCBB5dKN63O2ra37wV3sQG+YIiDmgT6B9lbzKeG5pqDP

Pu9D+1g7SQLY7aPkzIWRVNIX1Dswh+2UN4RChPN29qSSzU0DWpzrQwQvHl8dtc1y3pp1eFY5wCE2z378

yBYsX/bzGGxaJXpMmoeOOf6nA2muPXGsDhIx4uKsyk
cm9ljRHgNonUPJR5LwUWcU/RtJi+huHcKAroChVjG4xlD/mfqwVtSH5FRQjHYfgfflNGEyEbjTAMWPJT

OUm9/VwVYMkePMOF2zQCw0FqbBKGUdDN7oVaAR2cg4RG86I6fmRS5IXdGEO6lUhvTuEGV/DJHq/LJMHX

72/OIWJi5eAakMPMfpCjgDBvY7HoPBAe9xQ+cVYJHv
mUMkNakR+wbWlgZsYfiCOk2909wNR+0mogcWmm18YrGTCrJVWovg1EgjB6Nz1u2n+bGpaFs66nHKWEVv

pdpdI3hLF+g/1a33yurO+aC8M0pydU1FFpdHTVqrssMi2dRjUpBDt9BGHWs/B9hgH0Efz0SAWxkQMQf/

3n8p0cnbsypTaVERpegpZmhaOViuPoxy7iiPGGtmN5
Pe2dlM0bU/ho/s8kKejF7HKxUBkp436TE+Ot91LUEARK3Lrk7I51V5Osi6jFDD8M5zzf2TE+ftsxcyb/

VPvThpFJBMB1DcAvJA+OHHpaRWIxibhPF3cccNGqwW47xO9ek3vIRO98VxxKOu+B4Ztb5Gx0E+0oTnch

Xz8pEcasbSBhCZ4lTOY4YFyNfgGpstyyKRL3LeOn9/
hG1YOSJ/TT5gsQmt5siol8uC7IWF8MJsTD3m3tishzxBp1D85rAE1oi1jbr3r3YIL0sZnNwk3FFqUJR7

NH/pibBPfNeHCfImNEDi07zi8hq6tuesCaYGn0mEX/9Fyn88VuQbZoeqEBmayJ8lf0kIy0/tbwDYDySv

5YLv/Wu0q9WlNEhsh4znAa3wg/K+y6SsBI2hGCgr0N
jrR9GIG45HvNlbjyWwCaz7Lz+cgWf1V1dD9XTmpwCoN1x6vcjpxC+mvX70NS6RTrRjukpy5sb22TGsUH

34vKKsB1goYb+PLFBSMpdLGqX4ke40NwjdGNy+WIe02QUeeK5E3MK3WXcXFycVhU/vEglE+axDkBXAgJ

nU/5C3iUw0iZP3lsN6+sCcAkJaMO5Mk2wL9SlIoIE6
l3fHGdC0uezlh1YaJwIgAwicBUJm6XAO0xohnkDL1ujqA8V0V4lu67IQirlyFGqyjoKFbmHSHK90kNRp

+/l+fyCG2fGAa5i2OPqxLGnwk8JWruTEvMA4nbH0Bt7u17UeXbeGEwNChmwJTnltU2wZYcKodHT4Txfb

e2v6PDDXuxIt/MHgc1rvx5OA5q5iDC9FAYPkJfIt5+
6lOSWfMksY8g/SKTm8LWmzTrZuodI38MCkOqYy0UL/XN9d0Uf4FHorKMoSKxA/3LSvMm3BaYu8xQGZt9

RpXtZfCpnJ1uBXqcRyLG924PXw4T6T1ONeHb/kulPYYTlpOObA5IjkWy3+7lqFG0M240uwRcN1w4idtu

hdOOtr02heTEerFm35cGeZrnWDF5n3VSki9zjFCs6m
nky8fmj+MADiUzJ3QqlL+13ABIoJTzcMaxnFKuggA/3mQqgFvsIihZ1cNWpT/vdxRZzwt52SD9ISZ3g9

5xTNCy0Gdi578I7n9h10vbfgUxNQzlaunP1DXobXmo39k2vuXwv9knWxv3Su7w3PH5dNRGrsWUTgBkim

MfHE02dwO4JooaU66R1Qu/WwG5ygkbsd+BhXvM1Whn
NdbiZhERkHta3WnoWVOd01vlR0pX3kV5Y9mMczByt5iG50ID17IAcTmRw10RPNgacAWdRqf370FmFR82

J05ZypOlojAREX+xbPXIItziLx4dPxYmYj4Td1R0wwdCLwtUKsev8oOct0VXx3ZCexK4KUgcnHpAcPzv

VF5hFMD8cXkuLryvR4SYj3p9oG664TTy50QUDS5MCe
/0VAI6De4Hx5UhZ01B69zoNnpGr34Fdfh4ColF1vkfmwTKCRG+H8gQIcQyzAJITOlnBsqUyaCLqDnQ/t

gHu5DgnRkjak+qBO27HwX38o1pySRGyCbU2Nga07iyxs1nlrMlgKwNH1IVFT2R/gSkdPmq+IomEC2YdT

s7aFETM/qTeHEUSliT1FxfYrGmmAFYMJeHAC0DRw1y
bUrDsTovJzv6+MWCAvxFQnYMDiw0olzJepGtQxd44/y1VQLiNn6v4Pu6WTy2gtiPXMlVLerl+w8dRMUH

k188ZzxwiIwT5X5CEPT2TMIm27B2W08P55a0ItHHLe6XbB2KHKiUtVWZPpUARBEzjd6mJPf3lqfctgpm

3Z3urmFjSICvraPFVyL11kl6CcGh+uEHAoYBYDsyFk
+9wqVrrU3C7KxFCmc3unl4ue1aVWCUHQf3NYzFKsq1PVGagkUtH6NJHMZ5zx2Zo+PJD6kztlfsiVNcnt

UK8xOaw5DgegKf6jyB6xOhfPm1mAoWdXuKB9y8R/oDQ592qD+ZN/L6rkXeF3N3l3gH6k9yCL3UCiiar2

spAvKhIBYTCgRs+4TX8Ipe2v3JmGyt/ClfNA1Y8obR
YQR3umt06voAVcgbZvYmzJ7h6ATQ8KpDr2938G7uTd6WUrDIcE4shjrTG7c9g0gReIDODTFciZnaaSaK

qITCLPbj9lPTYrT/S7JRYaMrzXschSiylmtQCQTA999eGJ5vyhjagHpm3WF1h5lOJOTF7GOJhqEEWueO

eP1ZRo2A86n28Tgb5FD4xMNtHinD8QuyW1PsNpXcBp
2BfyUV3N/OO72kUy+LLEb+zNoDl9av///0YweHf+jdhzH3IRb/bkOyuaEQ0GluvlYlfUMAvXoaFkHqnh

V9N48WSNLQoejKPetUl9g50OvbUcp+ZKl/iY26FUdJErD40Sa41lf8heXIIsFmWs/JyAND4M0buGwX1/

epS/s/h0DdMjeYndLIEtXHUjvmYO0Zfh/6W4ZLeLxZ
etxEsflelR8IWiIXsz0Mmq1bNSmvqzZNZIhQx/qwS10XGK/iTxceW4CrhgI2v+mVKn8sEE7x6g4i0uqv

YFnn5xnOpNCjsshMn/m+498ff9QuIJWbiuLKg8jmVvaHc12WS09++nqCP41+V3uOnk3vCwFLb78oHg5z

um4FMXUJ1wghaXOgY8K9ZZvw71d7FR0NK8KxKX4Acg
wiCEdOG5+WXcQV5UPgN5BsgpJLdKCrL1MOxB1myYuxx84yYA+o9zMLTs3GV4iCUSNb70q1vpafirlSyX

iDK3XNIHsf7Wn+gGtm426K+DOTD4QCUu/WjSbQJeQNLRa53YdWTRwG6ULl+9M3S77KJNrRwtUxNcq6zZ

OvzBqyR5YY0fKn86xQAmK2ERAu8icGbeZimlzwCoSU
8gyrAf6eblO6/RRbdieaij9tIuCc7cypNp6hrzvu4CXmHXAIc0/3nOle6E1wxGu/dS7XDTyCcJX8tUSl

LTtU38+tRF8+uyNyhEuRr6oH0JciVz2/j1mw+OcUWbw4MmcNMKRvG5KOEWsGpjRw2ASX6bHJwkHnqokx

rXCgrXzOvuyJjcrHS5mtZ+bujr8rMb4VZwR6xW4/pU
SZ8L6CSfUZg+HCA7iru8NbtX3hx4/SpHI537DY0u6UvRyfoAArnfgEzfN4IbnVdq9OFTvSpsfmSSPszV

u0JnPNC+CIvuUi+H2vv5ii1LOHN1PXa8HeKZSl1y314d4aXJV+q6pqxkwhnaC4MGZMps29+1mxUYHj8a

PEe38nOHQSs4OEiR30KblLztcigBtAs2hZ9TrIzkJ2
naaFfIzuJV6ZlVvOWrfAf5K2ouYciolEds0As4u6mR4kQPc9syQERTRkmzGdCkMh8AhYFCD7xLYQhXwH

g7+jQVmJ7MNt0k6leHSCFOrB9GqWzSdjLF94Dpz6nAqBZknwlz0AUBCU3s7FXU6BbK0viBnJ0tV4exwc

aTSjOo3s0W3nrPcbEdKJcckdXnZo6BBVAS+KTKZPzc
CKBdtoW6qZ+Gt43DLU6X0dNZ1V/XSCPVvtUNN4gcPmd3+jkz154D+Gf0/G1qLlBovV106hrKWIscTJSR

9cRtT8F9KiymQ/6JbH8mZCas1FS/KPt3G/JezNg3WNyrFwG/gx3Pw7zZ+R2k6X7VIqZRKmYnRGndw5cw

LH34eA2p7wGNFUus1cf/pY1r8ZL2RYqO82lioc++cs
o5t9eVDVGFevUm937MLO0OHbpSSao1FV6SfWdokwQgXl0xl4UB46PPPeHswMJTNTefjJjcxdqreC77y+

zADylokPC4T3rK59oCrgBx//2p0WLluwYK+5TveWd//IeCIIBdTLMWL5ZgGKoxjvhC2axqDSRomr/lKj

bp//w/3BlfoD1TPWFffJFlDAeVZhhnmo2/BKVlrOmM
r39HmwNmA/sMZEHwAFQptWb9116qTiIGQMxgRyrYXd/+IclDbjYCgR6baHm/fRXfghUAGoJ7GUkLAag3

L2+D6VE0jz42cBFQPYFApnImW+qPDirfJXjn5/Q6cR5+mfhu3/yeoZdwjOx4bK8heDNZ2EtPJ9LnMvPF

3gs2vVxTmTJNJP/wk1BqOT3Ti1Sj7c5bq3AehFdq6j
cRumpKLw4nVUcDDUVdB9vMp9zG91aEx8a7tfHya/rBR/4uhtM9Qa4AVrhNEB+3aZDMPliJcFcP+iaUNB

z/zR/4R+1CvNNo1g10kJD4eYJ0kk0hL2tSyML7krZJUCKpDJbryvp1TetZHaT2ZMIwJYzHa6yzVRFTpw

8PF8KeF8W7tNuvadsDgSeJAaYUfkGoHAttrUdBQ28D
iHc+/rlgLgqaGhiI/ot7ZRb8TpepPJRukvcpGTHKsEOZh9Dp+Va7lNc0kSaRvkpA0n9VuLAnllEhIAae

pH7l07ir36Sr5ibjblrlT0Pj3ws1cr8nOaagINVFBK6HWzwWz/fFr+0TWbfKaPdnNXS3Up37k8oCOc8H

fkq2Rt+Qz6VYkg94M9AJwt69X1kWlRbHAQ6BxTyA0U
Pvzz70e0rscpNr5OInwCwy4tkMWCA+XPCCQy0RjNhZ8inB+/Fx9QcZRs9puCGAMnBgtGsOWiQg6YU0qg

7szS2ILWwk9VYIZQzq7Pl+qxR2BFcnsmFM0TxMUapyj16nL97Mb2dCD/rldcdH0crO9pI3heBwuFFAJI

Gdw41IDvmRVr3bddFNUvjJLF3t0DPoINIQK0v3lALt
nLeztKrRSrw2YDGJVlgiWHUnSn8ZXefu3M2tK0JnaUOMenamc14DtwX3UAsZ3W3i6xOz3Z0oYFsvhMmZ

ErLLjMirhrmduMgOQ7WHWvX8Qq5tym4zrVEVb7FkaRzLQvIEI+gptsBW3TRVU62O3ubzQoGgRgLUJB6N

boetK8oEaLE6W9WqfsiJvzDOsZjtx0q4Oq4CzfL86P
z+btHX9J4Ti422QJLM8FECYwAADhZN1X35xzxZFePlidxRJ8XmdapeRMeCuUSNDCEjupP6Z18S7DsPps

/cMcIt/h1PoL8t+klxFYWoCQFFpMfKeQ1bh6bT4M2D381+kCeNZ8meNG/u4CcxVSpAquFgMkpJTRkZMD

EBaHKrkuksI4l0GPEm5DrLDLw7e6DVIuxiAKzF/kNX
VAjNIGxpBN8jX9Hdt8bfQG1TnPF4ljuhFBPMPVy5VhiAFUQx/oyTRggQtC0q8QRyeMYh/iD50tH04Pm4

Wb6+/C6RqpDAjJ4m6xPbyW4V5yD2vwbEjcrxksIjzTZ3iTiIR0UsCn4NV96T/jmvFRXA0/golS6AArUk

o6Rh8GNDYg7Lr9mKdJDUVuBcuH1FrtGimXLAIv/8AY
2NAW4DEKzmKg4gWw8UkA4+vzBwdNUPWTrCaB9FKFJd2tJdXdtid43kDabyYf/WpU2IZUntOpcDHMyLhc

TZaNPI6RYbgFdKVXHOlghtlb6FYUY92lj0lA5CexfsNEfYflg4A4OTLXBNN/W2uqRiNX3C4IUkgXeRJ2

FmarVEMvd6MT5QQXKJvRnLLFbuCCQuKX865EjIL3Hw
q6t6GBKTIR8UedmJPAwgR+3XPvfbjj/B++x2+v/E64o9obAUPmtXlewX8i53DUyUAKud/f+ZFyglI7eb

u4Dvub34FMVuLK2ylDyeDCO3hKhRw+rViCgZwzCgvlsIJcxIdOscH8RIYr7nrTOGVXXPSeE29W/BHT32

x0PeaA+0b/HiC+bzBwY3jkt/FOv5hprsMV/ZjreecF
hMy5E8ACwfTp/43kTENFQ9TYGtXLKGCJObJLzqt+7FhxO/+WZXULMBoNrqtQYsdriMi+WiSy3JYOtKVb

uRi0eb/F9WjJ3Cw9KgHUarNwljhKG8JdzorMEHv50Sg0lDfAEiL/VViwMrWQWovIsgHUmEi1JTv3pexR

WnGW2Oma0EKJSvoSXg61lgqF9Z+HoIHHk0YvGF6s1X
posYMjn9nJfngRTse1q6cDc32ussQskkNIJPOZH4CWiDsWLvXEb3cs1rrFJ/iEF8UBXrc6OGs6R4M3O2

q3KJVnF2ThxlyVrVl7igdeIQRYT4BBdEjmqi1dhMM4xqeU7B8Eb+QvDZ5NNmhyYQf54Q60MKuUf73qGp

Paul+JAQ8uCuitQD8Dk3ms/1QOgm8l92RR4ui7Vuzah
k3GAfm1Yl6xZg2BUrB+Ei/l7GL8JlckBks3Ze3KmO4u4LqGwF3BCLtO9DxxkdXCS+jkVMPsXHxzNquJE

MNsoTdwkInz6a4rICZYIoLSTCjCM0z6P0pqNi6Y4pzKBBV4JHLmCA/fpvN3PWEBJJMXApSyfTfTR6Acg

XRYoSOY7Qb1Osza9Ts544yYBeCVpO3jeag2ZhmYVIJ
v6Pj2g6nSbeDFeQo2AUjqqvNxA/76fnDOfQJN7QBYnXPuaMBUiaoC15U2etGzPUVSeFtF54vjcUgn9NQ

ST6PpdqhCD0XIukFyen4D+XfdTsg39uqU2pJ0adKVPe/U3mVL7w21uLlC/kOmqcLF86rghNhhKacVkVW

3/PvtitiDy/H5h1WshMY/5g6hkExv1rvYrgEtEXTLm
dap1BycLiEYyH7hXdScfhyfptLWmlOh5c7AgXrgLIRCDPpzlKZz25srvt1LBGfu6Wf1VZbMY20jZ6B/N

VyI++yyqz9hhjwyWnCglrNqTHjUKK6hvtJXg8+rQFG+F0RtuOiiQAZPSwt+/em2zI6dWgXGSDdMfXZuN

i0o3vtI2V0Qva0b+Rrj/CvpCre17XGk0iDxk1YdWF9
/RHEHgg6O5LumH5c6RkagHSSE18p/r7HGOuNi93ziUkpcQ75jZq4j9h0VrhQ0tKmV1mCO8UGienM+UxK

l00dDz7pE8IZvdbSU49rSpZ15gfaTAo51or4y/2sEjgjXvuGW7F7Yv/0awt87YyUZHES/vCumsqJ+MdD

6CbblL3GjejdlD24O/hFzdDLufDs/lH2jEhj4FLOeB
zsXMwNJTdddNXnYdtQRYiEZbjl7uxb/J5NarrTBuYezgb9zfFlqbxlllUl/t1VRo7eNiiaWY4rpW61d7

VRGdFhA2ML20Re84gn435H/D+GcneU+1900mYSwR1Yu06xkWcT8WhWQyYszLVJIYjrp4zMg9mBb2yjLa

jb2tr2I0hkefjTG5SikPkrrAIA4Nu6l3TgYWHBkQ0+
klwyMtYekOP3cP7ro1xOPD3ArsPKZu6E9yQp+S/dXlCp1USqTyMPi0HTElWXZcfNB8T223WsB2MjXjoj

ul8oNEmigOe0KUuyW5/CZ+7JTZ7mqwfvXFy/iDF4T2AMjoadmMSgJZ8muCV8KfeZ5bq2b/k6dzs5wncJ

mC7CdJ7jzYUZtWnxDqgNA7J//q+8ea0P6Jv2FKX35Y
kv+wa9VscQh6f3Ix+HE7FBShud0WQUqRCP/aPLQsQQ+RD4vv3or8uEkYW7Fw8XhKtg98KMcVtLMO8Ndt

O4Q81voUy1EO2Alyh/Tx0wtFILsz+GxLftl+oPIE6qgA35oYnKBoMZPcAeMJllsETmxfsvmHWHTYjRsU

1Bkv8nJ9jxz4no1cjchEFtphMzVXYwwL5ujatrqUBL
fis0vNvIcrC2o1ZGC+ZrUN9ntKd7V9vc4YjATInHtPTMjdC4A9k7vGnitHKCmUYbuWxGnxzqh9SjeHeN

QgxPWnXGzx/Oc3mk7B49RPo6uRsHI2888GIxDEbsy9bKSSSve+wdhPXvHkp6HvhHNxS/bKpkYW5nrR+p

2QX6rk8222OdzumA2OxBJ7aHsvcaNi7UWkwiRUHHKs
fGa9wpIDpAn+kGcTetYV0OHjd6/WLYc9HU0hZD95UFzDQI1NUOkNl1kA1+tqagPWQx9d4EDx05QWiSBE

7Bh3plPjmL7X4ovA5NPodKQ39pc+WJHat5Ujox63OKZXVayBjAxfWKb+6cGv3yGKsAn24+PWwdc0gaVJ

d6X4Fm3XLC+TIe97diXJD+9hNUqP8AKLe75O01NykX
qMpHy46wn9de/Nz/s/FXg0usB+NFFYofVZsO0Fz7vS41linyv82oDX+Jw2s6WUQYQvLxnysSssHHvaQm

uJSAxRGV6c0ktNFpau6+dtWoa64UGkmGBVoSG3A7rPjCTVnrVV3lgmP96cuk0Y+OJr/G7Z3n5od5RK+J

Or1LMJ7jogzzJ5yADzJVk1wktAmbpNH1ePgln/KwHv
Dh8FAk0vJbVaDEYE9Q2RGt3HCfYhMA3FLu2Beur+qCm7cZZCvZjx12DR8J9WlfhuH+GEhyviiMvWdfBa

MN5JL6Cft0Lj1BBB4ymUYQt7OyXnm7xTAuAsO5lBUDvTfKqzVwmc6q6bzNSsFXrOJXgYK29K1+/ZVu1x

hqJGumZzL9eiqLUDEc8x18PptrThvpFHAOukIgIxa1
N2lqVKsbtcLTzqUcNPcnuUe/uLpBEJJNP/SO7URpO76jzrKRSrp3FF3l9/axnL9ZwCpyZlWT/NXsnDSS

VxVOWK9OLGdigef3o85WuIcLPNQh7vo8l6syBTs5ATUzhbh390k1D04Ef9+6tt9j2kwTUoM7mv3KNg7+

L6cMb1vRb6jqgt+K8HOMfD33zOtL270zgpWOBLs2Rd
mmeJpvpMN244jGh6MdzhQwVzXVse3gnzj8Pa+chhQnjAt3ycYnNHTBJfspEYYNr8J9nymuTg/Qpd8wDB

H3xWqbDrEgnxv1WmcbNaTx9bbiVAdklRasvDcv0Zqah/qb0QhA8aDn33/Wdui01hAU6YfyKhqWLKc0O/

f1GzJLt5q5H/8VTrDGCfnkUufGiQSpSRHGmSb5R38W
HgViFBxFTLdGf9LDehKLBRXtLJcC4rIbEyYPo4Hh8zbUw/t0YRPSg86THGtPpdfjf7zXMt3NyBITI61r

nmRNIu7iMfyvcIsK3oA5s7Y9xIC+vc/luV+UpHBHBbihjlKnMK0OW2mQG6Oc4sw5oNfDZKigT1lMBqcg

0KUA6a4GUcx4EZmHxED9gwC+RxRu/ClRe858h//OpH
TW8UG9jBwM/h3ZxkoLl+UM/Dw57iDgKOIwWvtEc/wv2s80t8j73w86tk2/0Xz7aJmMEQL1psPdstO5fh

6XcJfF/BE9bMJ5zmvlN3d3sOdolxV8fisgMZmEqUrOxMO4dyA5yZ0tVe2ZZ5rehMiWf2L1JEaUMGpDfz

JNnw6eBokYKWDmf1TLpf+oZMco9C1F8pL0SPK1Cwfd
vk1L3vZUe2wL4s/lzevBMJnP4UMY+4OKNhX4/eT6B2bgWu88vHOHQXpgctUcxfptNNZmhn8qXK5zQIza

FOxe6lO7P/iLX348kCmS4aD2ZBTkigdyxOd+xzlqd9bIS/0sGO4tgnFxiAEgbltpJSydBbbcNxt2TR5H

zRTe1cBpiO1yUQo3GqFszKjkgUF/48kxYbEg1t/l75
dMklLvY/TF67YahUNSxyVbdC3RvU517YeYJpsFQ5qeHAECt2DbPa/W2C49sNWKSbj+GR7xzIJvfollcH

06gi+lmpDwOxeVnRzMOl3l5V2ZNkda+h4ccSdXtIbtxhbmAolHom9ktp5cTc4BgVKPLrRFbdrPlrgjgt

QmSfOfaJQ+84XELdE15c33gWfYv/W/pbiGBZLmo3NA
6Qx3UhMtDsWN2LmmNnhVjb6fLOvb9r/KnJpkv35ojoARl0po1atNKdkDOlOYeGW5EBACmcA9maF2LD2O

wcEFMtn2DJO31y2r5Z4/HmBd6YuJkcBM0UsDIuMDYQ/ykrl7URsrEimsyInnm1Oo7XNJT+gp6H3JS6ky

GT3A6tuQ16iz/EZvzcOzNCeqh+kvPUXBMj/vPObZhA
yduoVgO8cKRj99HCLrKqygDvFy+BsByAB7BzCc6Q/BDR5KMrj4LVYSs1tOuaY5a4kA/9u6VBiGTcseYr

eMvJI2Ue9stMOjsq8VWIc/9ZY1+WV5+k/vXLz/PI6zfW6uOk0No/jbcIvIwR728ya5eByxHi0VIG9UL8

Fz+vADPdjC43VY7UBTGRAYOp87RTaDBdMW/TNDutsk
SnLvpc2ypds0F/uaJocuSsmBw4RTu3PqJQclPr5Rbuph+32TlZBkatziKdwVVSj1wKjmccZbk2Dm72aE

XiIpnqPKG+/gn5g/2mLbzPzTfVCvgT/1mOb1eehvj1w8nI1ZIEOEKIqYxwRtsP+NDQDdLrAo1uvkxIts

FC0ZrfZ/tvslAOtpFgVmr6DMcNiBZO2tCAsf2q1wLH
SkBg/02bav35v3yrFtwqGPuk3ysWGrkXOgJiG90arEGoeKW+5OgIsCxgkBeXAEulXeWPy6D1IUC00NFD

OBLUU/Ms/F21OfWKKcohy9FyiZHuriv93JM35M90sTRKfOava9cDADp0o3zTVrGBgL4xxnRynwTrrN9h

kr6jlEdSUiiy4Rfm6vzMTvHmc6RC3GEKO4EOfycdMp
6YNw79/gcEnZbsnWHMbDVWxb7CDru9kX4G8PqS/gAOSRmVN73xoA+aUuy01vZGWlfjUYj00DKIpTtJoK

SE/huVeDNpDXSzzDDXrvq0+Y+oQb38WjYUHzRQYL23/nrfcTXafJiiyac6FRnM5ACTuzFVayJor8zPe0

RUx8CpMaBZV0+xcv90PqmZIjaGmjv0KNEihlM8Zqh5
6au6navAjD+pfNzm10+WFt5vIxtnT0SnJTPRl50ipTLsxOdWuRMytgbLK2Az76LCkBdMK8MFRyn3wr0h

//eQowTA7Z34skVkuDK4Nwih4Ay6QwIj90aMIiOUg7ZuW1jIRbO/sVxWzOi/zCOVZzodJbG/eZBBdtN5

joj2MNhw5+6NyeO4DGQROadbHdt1FugU4MUS0OQkNJ
H3cCIw2mxLHEb9Bh5e45HgmYJnXjIB9a/DHsdxlYfk/0st7RIVJR9whrBj9QIs+v0dLWbjGgmn6t8i/u

E9M08s4XhqU3TOm8E1LzpnqOwW+98Xnv+fRMjRBPM+758Dek+bskM0Le3n7IPlHnrxjfCLWS8qaLEn0S

K5Ptu6+yu3bofXVKf2cdHX/xcG/a/4r/j/OhCOuciI
VLXBDvraQyDH0bwo+ZLTziCrlp7KdRV/Svyc0rWM7X8KBXcR5riWJUy9OK4MOS9rjSp1HbEcQpbl/sjz

qH3lvpm35LpOMTkKxtq0MI6MhUGm5MXrQD0eGiVquhWOt4jWxzQE2JAOP22koCMpdzygkDqxkTlP0MYV

niUY9piAjLhoQVfcvlS33DrwN4geR/WGlNYRyI2vcw
yoH6I5A0D+49zM5Vm325gC6UWNmfv0fLOTJt6IIkbuRQbcNyzyNIENx8x0BFcPfPgeADKYhBa3Rlb1+J

t21TOt8ltFQCZKSN4tQNML9Xrd2xeJVpnSLgwtoYZbMEFxT20PuxSvY4s+hBt+qv0+iBD4Y6TdH/1Ui6

xHH2uAnwn6SWNk7B9STvr/8X7WDYzT5gxP9EDP+Zg2
1OENBBNBeLTvcVKmD8pl6KKdMU+l31lXk50wPnZ1/kBj4izHlgl24EF+g4TnM5mJInHVi4iESmNyorLJ

cls+xvIa5OQigEWI1xIjp/R3+j7vzHmyyWosdnT0rMlI71ZfGzjMDHX3PiAKyfN7XA2XFOiY3xOYd67J

/TE0OKKIeq66+UEn2PTVZvMn4QhVgY8YT/DHyf0Xvr
oqawggcH9Ou2W1cY8zpf7fEzH4bFCHBeV7HBLBlr+phc0tmr6Dicz216EP25zIiI4Nr310ZJyUdcYzHF

zmEAimE7qZ9lvvcja8yIj/WfyjKKkpb4if+am37Pt8c3KqXeF44B4uET1fAIWvr7gQptvVaERE/xYn5m

Hhyl1cnOsurRSfxVb3al32P37oD9M2tNirZsUlhP5l
0fMbCTCersVke/RMGXLuZHyVNAk0TSv9iYVDo09N4OurYUe8lOfr3Ij+xbdpdR+fhRRD8nKxrktH4OuO

u9M5Djo2mzV5jX0uV7d+dBItO8Q269Bf+7E8Ww5I/vd7AlX45pnn/7YgCvxZayMCuBTFLh0PFs8BD/ay

bGT0zKQ2kM7dmdf1NrCvLsb7fw0ZKlEJZnqxymupLW
deLGctQVdFYp9Elq/fNvXx0tl5xk6SJ/Upal8mHXRKxNZsyjxckbpJ69Ey6gPHhtW90tcYAY5I2pAZ3a

I/Ff6YhqMSiN830cRXFIhnlLODW8XxS1FiAhzLi3RwUPsnc4acXtXblDokTl9XlBGvV5DE8m1dVxgzX6

VT7I7Pnt8G3nVi/V6x6QkcF8BxbTOp5tXjq1aA7UME
x5oE7OEMYNe2OfFobDKyIcrxO1SLi8M0UqZ9zZRTDDs+mWwztYkF+61BMGsFq7IzxU9/MRCk1dCY1d0H

N3psRUTYGKsSdZouWS3Ucj31YMzva22UV7Pg0jjbdkoNukFKuvL5wJgA61E5XeAc6ci1zqMfRhIza01p

juEPDTzTWz1jOy4kFN2K48o2bIuOypfsZgvt/Z4XHx
4jvl5yffQn1aXo/vVcM4MmUcNjX70v1MhFAgG86Ifa+Xpe6EJIPifoj07TqrSKF2WtBgwcciDW76PIA2

al+3HzwFfsG1ZpOZKB5Sw+NjDGfURauTQchmd6dT9esPoDLeg7z7X5NmpY0KxHywVdfzYjMeJ8s/1b1T

33iz2Y4u4Xi8dcvMrKdAe/63RgEvPzxcZlolGbNHxg
mG62ET9wyEZ3j3KXftqsgs7gaaG2TQlDgxjqorlw6Wf3nCzXj66aXFsiczLzt1R5N4vNQp0kThink9gT

b0klm4LsWMNgvWLoMRmwRH9lMwqaiTATTxC/fFofnGZ7/EqDPvRKPK++udVpcGljsg1NAYtUyDOMuWed

rNvP1rgynwI8lkbCR3Wat1/1461zitK6rmKNTkV3s3
kBceU7LRzExUMH+ycVKmx3BdyIswZfmUuPFG5c2oXUqYLLfK4so0ElOm2y9sPe11snQB0+CPoIysHpO5

VFd5qoyhgz04ITBtxcBcN4MrGXGk2Xbn72y8nsn6Auzj+aSin3ALkGwV2PwVeFS5ZYKjY52vZj3a970l

PG7ssJoilGFgzlItFJxieT1eW/7qphEY04ZF5yOkLp
7Fyx/q3QA+n+JgE586yBAeMTJv1/3+mg/f1vSOppR23W3Znga4U5+VoTqtYfvZzBrk4ouA9Q5QkJhntB

iLqeDrqKCUxjcFURg5rOtIthA2wpDKZlE4h004TvJ/Wqb8FFMHv7nsd29abW9RZT0QgbPlTKCaCL+4RD

mAOFSbk6O7nXowCNL+j09MveTMtw5DIheLvzOlnIyU
bF3suAeaj9hXridnf38rWJMzB+2qFv/ujYNs01EMyInjSCmwEuGPjOQALgGa0Ej5hi6y+5yxAVLgJJRu

7VIH6cojz69cm2qeSODAC8FJgoCiNSb39yQ2irxBVPwtRR4lkBMulUJp65zxqgcJ0AJK2wykzs7feuYq

A8QboimcuFtyO8u+XL0luo6kRgMAusmiBFUJAF6EXl
ea/57rjN8q1a222SHjep6piZphhM08F6IpjOnbWHEpobJ/qwFNAP6VIiUWPE0aexm1W99j/7J12c3zTM

Gt9sKwRbdgidF0ftCp4zF5E+w8pRAsee60WcKpt/+aPjI2cjNhBH6siMlbOM2g/ap8ogp9Ep/lJ6TIVt

HHS62gw91xCl6C7ueRDFft+TwJoG1MwXCQmh+xSIg2
8QqvxV4LjtmId1+932ja+1y2pmEuaA239B87BsFHXwHB/euQWtoKsvJw4CU8Bc44Qc+lFGu3UFmk+QOp

PjWDZZM6J72xQ0KxR+6JeeFHFblBuc7DshranaZ/5RV82sZUym5GZEIKJPPAuM4AK07iAC/6zVUkfcw+

ipljVe2PUvjCL8Jq20E5F3fM9adF9ImIHYiQjDYbRB
ru1VAvihXiV838KHvtyR7BNDVLW218lhI8G5G/jx3M0ouAvCT3iHUN1WEnMOL82BMgIBxwoEu3N9Tt3D

5dHyjutAAVZ4vL2xsQu7lzDb2IMUxmLjnJHNHQMfeYwbf79IUsqW3TTxjZVWl5FvUEKKl+Y7Ce4I4F8T

/Cot54PMIU39+5F0kZpj+88CMnv7VqgdtRUY0SDpDj
8vZx/gV24eNfxTwCudpCsoh3mAwgVlgXoO0mZXywO2b0TmG/44v6Jop8C40uV4Au4VRE+79j7u+QWdN0

TvC73hxG7leM+GyMQs7w7YBZnG/Fi6VXZyzPSh1i3wruAIa4f0/oM/i/+Bv6+3AOR+1gmhzhWZFTa/1o

v8tvbVErgH/5EVg9n7FdubyC1oOxDJRV02s1st8jUz
1gfomY08cVIdBsOqMRxwmOZIG6snbZHzpRHBRTqS1m0xxqcdbY7jhpxE7J956cNQJYZHedohsHCtOSr+

uHTyW439tzy6X8eqjyzEJb15IllkBZF4SEYmFqxaatPvC8hq3zISx8fkpEslIZqlzKIEoXtx33p6f22O

3cicIR2TXuEJcL5+VtBwXGXv0tJge5Lc70bhB1B5+L
KnQ0dLjIaaRoe7P/hDfpHR+0SBgMgduBCGbT9vH9dd9hc4Sx13/aJK6xo/rWx9HJwaqiCGHel7PtmV2V

GO4ZnbSDcNSF3VeMPI15QX5R8v6xr/C9KPY047rmIo9mh1KxGo11XT/CaRtRgq4kIu+KpWch3Ah+ShWu

TbEkJt2fQusm1oKVjf7y3O5lW9yj+MGLyQmYZ/bD/S
x6vrDzgkMkNOlj7HbDONwvHjcjnJ24fqMvr0P9WjAJMJL53Swvg2h6wqeSoZuRo1awsKi3BhgU33XH8y

9BGz1V2+DuPk7A2YxFEBoBc53D1LO8ElnhB3jpz12g4DBYPEpgY0qqkBBAZgTrUXbO79dZv9AsYdK1EL

kLBBgja4CBiV5lT6tNWL+p9PTFfjozpQzsB3Nayr6a
tTeYKaKabSCQ0lHk+vXceoDNOFDuZi+32B6XMAsNMGe+jkWwD+SPURiLK66M2Tz4n253Nt3jcdLzv0Vl

EpyTXc6h3lp3JUc77jDkrxiofMH69wZ/DNx/aVG/CjK1fMt0U6X74fgi1XzNLqbMcqhevha75FCl38+l

/8I0EVYqyz7r1a2jZPjYkZWipxUny5p+aQ4S1Yh10R
s+Fv0lVqokn0muLDeaYFnX6gLGmFHnmA6u5Q61HQMhuzRjHLBRxk6utUywYs1f1bMgP/m07n2w117CGn

xogqxCzq0mJEHd62y6VFjJK0kKDC+V0vmZOptyEY39FKnpGgQDp6NBkLSm0PcdvARl15qPM2JSCmkJWz

UfPkELPg/Lit6ZQmu+2tk7txQbNVUIGnup6j720Kmq
5hYfuAX0sQToTDwfaVvasVvHrU9cZSaqFbj4FoGzfePlbbxyRMn24ELc3egWEtyPIib+hv7W7bg7NbjR

QTwJ4jXViylAddNwFHIlTz7+b5vFEy42UM5BCeteeMQ1tWdHrBjNK/Q1R0oG2ExmytRPGJiN8+t9JSSp

YGubleyvojOmqxUeiQttBXscn7Z9pbMaHlCS/NVScf
rnnrWm5YAtC18ap//FkyPe1OWtiJBRz7CdsozV/5P3CGMQpBtsu7RmBAcigvauFrjER9Du0hvoih3LKi

NdGxc49Pq4rOC7oyJ546zhDY61I3E459TMTKIwHIanmiJYu/n+bljZGL2NdE1NoUISs3G+msnvPby8fy

eN3ZpWBX3P6wAGgra//JJ3n/Y4u4XvgsbI0Y/K/E/0
r8r8T/Svz/SFKUjEZ9t/QTRCSjNePNVJHDgRvCgcDsv+3A/i3Uq2opFoyi+KDuY8sAQuVGI8sNchHMoe

ySjx8+J5MQQPayGmC77wJUASS7y0jJfpajwp4IvOLNFbyMx69QPw0AzCAK3p7VrJRNjWfqlzLoL1ZHtB

zXcTQK4LheStUP+b5jDZs0KL7jj0A0zTS01IxRfd5e
u2Y1cWhNvesOFYe0fwqnH2wet/m1IQQzcg9fr80MlXNYiBxx/07Uet5AohNOz1ve8usHAmeD6iaNvpuJ

082SlQybbCIIpBUxGj+kmPi+FkZPx9nr0x5m0ZC1LAvETGG4XsF1JdtDxF+f0M5k59lqfhWplHbtJ323

WLQp9QTWTEhBXMy1d/+548fZcdOfkIUPlhzhSgnrzn
wypQnYVYFRX52g6DSU0hCCnRiUZ3aI0pQtOhMcqpfjkeG6x/joCEL004R0meTLuGJqcb0ijz+aUYF5Ms

X80iVdD6/lOWCXyzhh3DFFpDQuJ6RfI0DBk0cYZ6lCt60/ZI4Uz6csGwLQNuTgJzbSzgMaiqXXB3BOmm

0Fv7CLwMYaBBFqhIX6G31W8WiDzc/RiykFNMfVUKfQ
IFuu9V3Agm3nh0eZElHudzr+0sT28LMHnD273rM75fc4nAwUTqMKmJEyz2HkD0c1dpmbJ2Ea1Vi9/DuO

rrW970qVmmreJCMq15tdpfEZnENfWr86HSDcmiBUNcdymOlDT1r02ULg0X8BKumcMmr50e+0oK7sFmJy

hT33HoMLtZZ5Qm01ZEKSZd7CGMKBj7Ray6G2wkcGY9
AnefNpZWR4b71Pt6lQ+H9+uR3lLduoEIRb6VKaIvnCw7gD/oMrQ9ZO+m5EPFD/FT+kkDn5918FuF8u3F

aFo8+xKCX7TDwm5PFQaaIhAX8x5PvghaOot7oPjwyhLTM1FphM/2Z+BJpHABylEoauXdd37ml+SrHfH7

S0l5blgfMYzDucmrqIJP31enzwh01R3VbK968lRl4R
4/bV0ko+67d5gRxrLLrNAkQNOlp5scASEBrQvaVP1XrMeWysXv2/5DtFNug2ZWbcUDOUz1Z1rLalvqZF

rqH6Fd2LfU1XxAOSWjrMYiZ7dWNOns1Yr6ksENarJVAUxxLJRLf7LIBkWfaaZ3T8cnJ5ac0NxkodZl4v

hi3QSMkoZQa6KknxdJzwsjtpolG+CWFb5LO2NuDyAT
gkfCGAZKeGgS2KSzGU9bgUfyLRYyWen0rfFmWIy13CktCTz7beOxok10TWHj+q/azhAfbw6OhNouCMlr

VkU1IDaFTT1cmHK1gZyhhCc3Q2ES9TiTsCk8+ZMj8hWj63myBXN7zXuXuoj44Tyj1knLfZJbziEl0z4F

XWWxvi9/keFy+PK3AVTcqWewPo4zrj6pgrmoDdMMdU
VPPbhVol3Ey5Oii6DHD8SbT8htFkkuJD2Ti+Y2V/Ur08P0PfK8q8b/XxbNCMVdIzG1zR9TZPGmN1aHjg

2QXGc0O/KRRKM0XAxMgZmsP2l9EuvJXFMjVgAnG3z1pyzolKdu4mofBwpadIYgUaI3IIW+x2y9euPiUZ

jYsjCd5Th4dlQjeMKokeaoQp7nfctfvOI+oMBDLukG
0pzLMSzQA077fEomPrvcKO6B31q5riqGQEpDiszOeO+EH3mnM2ElDNG2okyxQdVj2/84LN6Hnj2mnK2R

Y7wiMCv3Zyi/nnzF3GTjVg0/oBzliyIWH/DnazCqvvgHcrxPWZH0U02KN4HA2Cp/pOBtvoGe4mW0lqA5

jXxw+b76LvmZPwjTzQG1dKvZQKJIE7p1/UUW/9YRvW
9lMjPH3alx08lKpxISAiT7qB0p6gf78I9VNAmtVnPDLthFiRUHFctLFLcurAHTxeQl0L0FbdTv2plpxT

gxZQkh5svdc3XdjXN9Smo1jnbapzLLNxlPySvrt705NhIaZrHJrAHeRphO36OeJGIDesIjDlq9TMJFM/

3J4M3RsEoOMU/+1a70GDCAeoMx2tLyNI50lvrFb6kL
DYGCH242hVNCUg+UawU71DWRhGRFL21sw0DQPWAMWelqw1EwencBysXCnAU6hfvKAZ50gg3Bx9VI1x+E

3T/BSYIRrkh7dTJ59MSp7lbgmsV24EO0zQ5sIYPEYwNB1cCyOAYo/H6VnWcWtvmXDXCz3x+i7i8aAE/d

eNGn+5PPlyTRjK48+4CB/4G/DN6o2X4wlQT3thDxxx
8B5OTC/SaugpEk7QtriUBA1TW1h93amFbcsy42O6dE3EQBnrSRHj7xgw1nOAt5QXf0CcTFM5Pb7lO9QG

etMGQJth2PR0CCmktKT6vTQE/J/mKpbRDvm4jENfApz1plf7w24ffaAOJQmRuC/t1x59Hy+Wf4AdqiVG

gr5Tm56U0vMjXsvI0j1X2bP35ahVIVJeta0KLaaOD3
3WIEOxt4VvcZCx4ljtE/445L8SytwH51k9KXa3+j8Far3+BnL1Vysvik5H/tVa3HnvKP+5BligzX80zN

ZashvDrd5FatgPzFg6fXj7y4v//hwu03wBO7Yj15vzjtDDdZECtrlPd3BlEwXO7a3U1hGWfRPr4L7g2g

kpaG7ghMNcoul29N7kPgdiOnBSZjwNvOWoYqqrfRl1
t2kZKSCJOWyXHnby43IdYecyfzworzEZCAYEl/epyGbIS01q48eQR9KA+h/HkOLzx1PR/c8jPP/bn5ac

GaNnxCg8JDpP+nOCAMycYiW3ssHPgW4rmBmGaIjCFILwaw8M0weX9mJBJolrkgbsriLJC9/1iiASHNlS

+YOu/hvduSwVvIskv8Ad50q7ZmunSfDm4a3KjiedD7
HF80OrkLhmzSQiT8+CMA+8xMZs8bz81uSlNQoYB1fPViac+hWzCPLmnCKPqQ9Zjhr2HhXUWxrooUuw2j

owQ45ak1q6p5Hl6nhtK5GzghizyCM9RLzDn9/+UBkeiiusQIZr00403i630d3Cy8JtfBmyGF2gQdXVWo

UJcXgtXcTSn2xEhCwTrEUc1QChPSL/xfqHIke5ReFc
/mp6Zyfog9bLacRf36Fo5BqrSn83jUQ+7/s7eEH3wAK9W0jbF6b+SBQpbDWT6e6oYD5Vurt6cL9Osz6X

57EiJ9LRhYkB4VVjoWbOSJ4k95Uw60EhX1MiWuHvhdZfRk83QMUCO0SGcbjSfgRpeWDqnaHKG0g2xkDc

tCvQ4KGuFc58AUkbmNhGd3YIqp4tEFDkOs32tpRI7q
VSH1bssMI+i7qnC4qF84DvV6ib5ZjZLuq5BFEwIfbQ2zCyfr8mTR7q8ai/kYfI/kncQUuUmEq7cZ0HOG

3NKVYu3JjY8aj3UbOINESbMkSOpS4mqMItwJVSMGJF2bWJ2/Ooyh9DXN5cDK3/L4GTOpWGxLrzcClawa

SVql4Vwf1NM6v3hnzN3W6FKpMWl077LFx6GSv+gZuH
Fw7TetPeG11D1TazzdnuvexEY8QH/K36zgwL+yHJD7jG6w81xjGRS7G0HhfZPsPs7777qC0GVG/Iqhee

EJXkqbiRoEc/g3c8OxqJ5fxxnA9yy++dyrh9doqDmx0Sl9rJXkH4txuAEcQn6AOQ1Wd1qiG8cX5RJidL

RXDMa625Ill1ZjVE8ZT18RjquKvzK1PW7eWHSihYDv
/u9ljWoYXxrDQ1GQzLRvmICIYhAZvW7bNvVoHHfi9Q5mtccOD2Efqgk++a3zgH7S87uWkD3p9kPIaif/

mw982CuzWz73oD1/RnyBTAN+maNoWq9m2xLMurtrQ/cn/YYISevIyBPULR8KUA9IlFlquA16vHW7Qnqy

+ePQ6HUui9m0Co5HhKDNyEjidL9QAo/wIHddpWjQ9Z
LxKBVwBGRxKmTUv2kC49gJQGkQCHjnToAc6OurJXZB8zAME3FcASy8B+Awy7UQ091y6cyDFx1sCvE+AN

1KuqFiFxnuqyl1t/HsJUHraKaRZZqxeCl/XRinC6z0y8rotBxG28Aeh9Oc1x7e8xZOCbdpyd3fWhY0du

KjTsBplVM1s8Q/xfjSLm8VNRBjxWh2AFV1ra+vKZK3
Tg3bRCA/0qJcVGxh0y0vq1AvHfDgrMqF8cgMABOnRGDJfLUzt/l53dFQZ5HWAeO0dVYEhsl2sPz2pEj4

R8t80pgKqZfiyYD2B/8OEUBJK0kruAgjJQFLpelXjlwCRMZcRu2uncZ404/DTwOm15px1tyEzhCgcar2

ziJv7s2ghNAXPpsrr7MYWRDuJzeV9GcbyQqzSalgas
qHXCfCfEp5oLo7mL1RQnrqDCRHthL2PC9AvmOrkaYbkp7kTb9c2TF+vQ8pL5z7ajJuZHcECZO4Z6ZTBc

eZLHpt940Z+/hoPxHs0TFys/c+VemwT2N6//h1d7ibTkqK4ltUR5eVrzwp9+1XikScakD4DOH+8xbzIk

Vtyz9QiinS1azqpqXm2WbdsBMMXe22oqvsLLsSF6Cs
mQmpVrKZQa0s/y8JPcOj+4Um7x/Elaine/WuV9LSxMgJZIyxny1qUjWCoPhTiDHREd4FvHt1hD0HpUTt6t

Y+bGxHqZu06wcbK6zGWs69ZdjypFhzcBPgTZky+UJQhVDP1SH4KvIAl59+ziKJzA49hvl1bnpPJaDYwC

LTPFJEHvLaz4C/a003BTAPlIaRf7ShEIsnf8DGwxWr
59JSfFxKOLoUdCyjF+tFvq1glI3vRfvehboCvDNTDrbzwRLEcLucSsh0Qr9tUG0xhA0MiWNJsl+cvWNs

GWWfUob/6BSVqJcdFMhlAfZMjI2XNmlTN950bA4xngLIKCtd6VfcElIhbijzSEB7bkWhVcmg0OThivnp

/ZH4tPI8hw7LRVlzeD2z2x0rNs2+nomYmA+9YxpfN3
c5L/dX8ITL+FX9Bt7N3TWux8iU9XDeqCwV+hkz9pUkSTqFVVeqO+EU6k6tT0RZdBwQPGmEINvvySb7Ja

P2iyfOx67JzCDXSvoIVVSrPqCNvtYbGIbieTzwM+7HqZh9q4YdriRt+C6B4XBeyVLB92I1V0axGx634y

FPtfbybWKAQrUbw2mrvBfj3KeDB8RcxhRDp6beWpEU
yOxahKW472V/dXvoVZf1iIkHkxFK2MV6rCLqihHWRhmAVH87kcaJAHrsfiHMp+KLXVuLxsYEOm19Od7V

j1PSSlbqQhyq45EjN6wJDpvk0uFFoHKDI9iTgdmyXAqHHp2be/cya+aH98TRiZAqPeS8ZBxivRAvF6sZ

ofwsB3x4ULx4ZQvdmju3HV8t3ftIotKMDSMtWJRxLG
QcuMcZDXrrCTnNpAIOZcv8m3lYdQvzMbH2FYXo5OBR1tE184QQPrexK+oejEJHOkm0VPPa/laDG6aZ8S

Rb4RJ7H9qFpqsEx4T/nhqGWQO9uQPeCtZ2rdPajQD9x5w9zQm4xAQww3sY5GE8B2YDbaXPheu7O/tFOX

pSBCRdpliGiXpdNvuM2mQ9RDa9ZWiTViWnYetlMLx4
h96Qx9Yc8+01gkuTPN1RvSfUnFcIc+5qm6Rk7k6iB+P0tb5iDQiIPzwyIyGrKK38WnVSZxyKZzV3T9Kp

fWGLV/Qdi1BxR+TIXmk7sD2WaPdW8EpkYpX1nvWhc3FV+j/i+iWGPXn/tbpbwocyGS11DEzkRKnLQKhH

dF8YU0r+N36tYMtmZdzlLAjDPuEe6sVDJvg8JB2cTr
DBxMzSkXbXx3U9Vze6Q2OsCm29ZOyQgxB5yFzHv5ZMh5isKz6osmgeip016MeelKCPkwG2whxdejQCdF

LI9MoHVgfd1ZPhEeO8T4kSoGs30+WnYi3mIvQks4wwaCicW0INeEhoMqFPxtqvm+IxWCa1s+1o++3qSP

SVum5C8/saAOiIdFfZ0gsUVjK+KJiWqqZ6RHSQTM+G
R1qZHcBLmOaJwJ/Nb5ktCRu9EaYrP9T1c5lKvhGRua9v5digpRsg8eRXb0wBi4QvgVmnHRrK4Saosx9E

zm23SYq1OOw85ENoa50/xJzJDFtCjs8Fhz8LBOyHvkE0RIQxY6Xkd9YPuEhwdRGQuHQAI5vy9z5XhtAv

tKhuJcfP6U/DJGEiA/cCUHwwQ8auv4F+6PSE0YMPdP
qqE2sPfg80CqvYvZeMg7NtPPGGrsxHcrs5vNg2XCmyiTMlliY4cFC/PEWMa885MFYFDPm9SZoEWuVOGd

CHW1soe3FZi9URfgtoPwJKJjxwxMI4tmfeB3kfWNE20Oz2MJbXJAR0eNU/kVFGDm8bFcF3E5lf8c3Dqx

P3wS53cAcWgpGYNJm8r6a9Ghw9R/VgYRdlshlNrmhE
4ipXKcO1Q64sMYqRZlpk037w8W3H3zlh22L7hyPwWWIWPNwlrweUQy0O80dbgEZWSuBxQAPTCPZ9pOAn

DhZZO0KJ1yD8C/srMKitIzpbePJ/dU/wUXuEkbh/VG607qQpr35nYzh5hFweU/cba4zIokEoHGjaGLZb

xoCvIMXV/wf2Cxlh99n4xUr8T02TsYIx9SC/8yvIZJ
DNnAGJxzH0wTnO0KqnH7mWVwVHGkG3JE2iPt5g6K48TqZeCB05qUx2j8SESQqgsSfEZNpaYVbrOU9lAF

bFUe/SL1hU/PM+n8cVShJBIm8d+/lvGCK2ZXfl0DhwcukA5Zkg8hA8VzyJi0xInDEQjZH0vGj2qf+BpV

iSVaRWeEv4UFNNaw+WF0LlO88/5RN2oKFzMZPKHZr3
xaM7eAQGJqdhZSXm6znJ7c/x1XdbddfqH6UDvo4kMau/sS1SLEeiskseSDhC8b0xNc4rSH8zpPec9L+2

oYdTUtSv2tYUw6lEYHdN5yG63baz2Sj2iIBvSHc75z4kD1OJ9goBeyBlOwxTYnMDeDxHtCdlU1YwN8y2

MkQ/H57ZjvjU3Pw4khDr4WsYkqWfDAwFdqzCz957ZK
+8JxeUmakiBd+25GJj0gjKRliqSyPsaLJZtqDSvH5fEz2qJOJoBRojgRDSK4FtqOUIMtqmIU0AazWO7u

Jps7XTQ7ao9L1ifLbxulgAC3pf8kyAfr+5+LqM4q9NYGIMcT5BaicK0eD/GxvTE66nSvOM88NO9y01+y

vUGV1zPR8+Y4gXz/su2562hhhNE07t6VaXg3j1wvo1
CZRCswY8D8VAQAYVcTdbrVk/m/Ngp8Ga89MCIakkVy7IfV1QafYfcN4Vu9+K7Y52t91oFrq6rgOd62Js

Ua40AFMnKL39FQCAFYPhP2/pEFQWCGEapw/wgq4Pnsvw1kFIntTUaAyCVvLQ+gTt5OHg4nvclbOY0FD3

o63WOT4zxnBxNGfF6sdMCtLy9e/VRFYpp9YeKhTEOK
u1iianxHhJ0bgUap63Jp3Cjdb6tE6cDm7+nxHIJMOCxaLB3JbxPGo0Vc7v/Qe0slOkNLFjLQD0Zy/7t0

jn2VC9NyE3InTlvNrWA49ruYj8HMbuPuGsIBpcG0JsYtmjrnoPgy/1I87qb67j4elu3tQ8HBjhK9Jihy

rSw3dGRw1WZHzeQelNf+9Lf1rVVaNd21hxo6CB66iY
/zUwWNjpzB8yFzZcvQJwgiGMGgeBR3OmG2U98rP5MbU+kmmdbJD7GiDA1j/6HTFiEtKTOLhdImpvMAmt

tyJA607KShYvubtSy46pPYJGpDCLqKSIlRP+zt354UTbuwPOQElML14ur7snf4tJbL2x8hf2xjBTmTW8

Bfg/O5kdK+vBgcnBzAZ92m72Vik9jilRlS4gz2AztS
q4v4k1GoLvKiqViU9p0btQ0aNR5giYR4Afq1C57kJX+NNjoG8DB8E7bhH9SuMI8Z+q5KSLEDYCBaXPy3

vJCFywwgzrE+APwzW93necyXOwDnt8ct6psnmMsiCqIbwud1WrB+ssaHzk7Y/pxuklBKr+boN2uO7Ddm

meTjqs1ZyOADmvEeBbsjDpoyCJpXJi/EzovS19/skH
z8le7J/oNaQ4t19akghZqTnc0jipFx9tVKTTE/weDgnTCM2Sd6lACaGLPWWD9MorYjBxaiqlxMyR6g2i

BfYyR+DKJ7GXpS6mNhlgP65lWMSyJDPKKLip9d6HwigzlLfi19+G50M1l/yEhW81k9Kj8no/EJwch1Kp

h8+7rszDGTLXqwKBZ/xI5KPrBUuUp/eYuoJoc4vicw
PjBum8z2AiP5eWjuNSXx+ktoPwEmq3hu21Q766uVDi387L3MxMdbX9+1TVB/M9JH8w5e30D2QFl2iKzp

AKCMH+LfM27tMXhbAYKGjB1uVdWI57YhO7B/izLFS+bPUYbkkZrrM1cJYbNcoGOgjjQZ+7x1iy0nZCmN

P1BnAfUzVqf/Umo5Yjp+/VPb5WkFCzqpfnRIqZg4df
YZcXmIowjJkn7tGX1kv22HhuqjmCo2oy/noI4vgiB7W5+NIb1nZYyhECCqm7Z07NkFx9tqoCMnSBFeG2

Imk66Y5nqX6/It6Dl13jc0YR7xrUQQ+XFjcCVPzpYF96aSNRyIYR/qMyF7PBw2EtF57tsRcNH1Ye2847

uaCXFSRb3aMNoT0N/Emj7Uje0jrYknO4v9su+qTekk
+FDB+ivQ/m9l8BttvhNdXP09s24c1KWko/NXfMVAb/hBQ63YkU7ZjoFyoVAogXB21vZx12ryzotrVuBR

uf5FEi9kvFQ/m01aE2gNI7DL0kHmaKMbMuVubm/RYwxF/hyJs5XuI2PJUboP4sUyhzXKk9plFvdufYYl

YPswMiGaib0O4BmhiZS+my4z7DdVGXGT/Hbig1iAdJ
dL8EDZJ0nEfUXJj6vRKejVoxl8DaGOjQObew7i9MzVsXTNz1fIt1gk13CpPMmjNp1kRuLwmRrRbHMFqC

I62IZ0GN7RhYSg4wQL7jouedJzqy/LEt5ocv4VlSTwj8DNG6O80n1ax+No4lQ7wea/7NkuEtM4LFHkOG

sAvIcPDxppU2NKHS8f8Oqacz2o33/EadBqsa5djC/Z
reDuuym2N5jpgfQfFyfYH+lpGF3sQ07Vi0b5tZv5gEt/EUBFRZC2zwMts7pcaXUw5nA9QWoVVP8vwUBu

JUq9OEopteugweVWkcElUGJRJr0k2COcLWHR22jp/TQDobBZNd9/wgIXC334byJSSKtLi7O/KoLApzsh

1mWkxL01s8tb89Qe6/hG3cT6QICduuo/Wbm9xU1q61
tNPkFZNiLFU+vvD+vuDEMhbniF4AhP9RLck0xz1PNeUMdr26tTGK+jaj5i4rcjN/lPpEyYSt7U8K0dtq

KuudwJsDU8sHJg7gZh0IHFG+SP97O/7mx0Bv9iSpKpK0wXBgNMEYqHUT4EC/mWZdq9C+u/aPEB/63T00

pwuBnhL6t7ww3cfeJj0EhFkE+oWPLVnctyKCnkqrv6
E425ehs3zW7NTFWzGwlSGZrworsieBe3huuRKfYcX///85LcRH0GSP2j1jBbbH6UKSM9aaY2jC2qs+9T

9yPAJqK6a+eW6QygzGt3QOOmyLoDSwsIu5SvHRbaKXMf8s5S8OhUaNTry70vEmSE3GVh72759rU7vIgU

vOcGEd8FUvlF+iiOpnNDY7IvxuKDjCVfZzG0Jz7Tke
vH6FSANIrc0bknrk8vYRAGGIj7ybq+CldNcdrcAmU1wPtkILbcGUfnqSmNg3BNi/BN4Xa8DXd2+GHVyo

kQYnKq1FFBeg8Um44LLNfYqpD7iUXxIxiUIf6GzRj3MvDsES5IoHpx/njvhY8+4mlRh/6OsH/yHtvdw6

N1Da1MQw+f4UGcEeiL1hMdz3NqeBoqf7M2oFDYczxb
g8vYyKQL5xX8CI8JpFFYO8mpwul3Y2S4s1wlHIfPUsHQ6mgc8nghG6UPoi76tQwZK3hJMocGlSXl3Kd/

J57jTn4Y27dOqB7p4hd6hSiMXMoATz3tm75WgAkgACVM7Q3UAI9nEPfF2sHGWiY7MUkErk5koFn+/0dg

vSiV5fqpfmODb3IeSjDEhr6KyYXnCg5WDcoIZGRQVS
KL3qodGkH6GWoCgq+0HC0Wwvff3QSLaOj0lL1yseTzCYdYgTe+0gbU1Ztyr2266txO9e68H8xoOeEaWY

Atc+KWGrZqmVM3Z7RfkA19pEYRPwGZ6m53/Ztvo8pTQCjVNomkIslnEkBjzRfutitDQlLNkY7yJC3e39

pvThvopoQu0FEtMUioro5sbfs0dgsjHxWzQhjCbppJ
n4Ukbk3Ca0Mjl89jEFX2/X/7GdgxlxFmpY/PIiIrMAgdezOc0F1MIQoiXPz9/Hn2gcAFg9Ks9a41Qrnr

hWVAVEt4TkTQJtGLD6/SmHwAbQJ3AEGocpgJsXp4xqcrg/pjJa6NxP7BZLZnGvjvYZ1vXR9TGzjDLlG6

Mh+T6pMeyxB0I9hac3L69OBk5blkIEzni/KOgmOmc2
1w0DjUqCbmIkp+FfvA/2awI+0RR99CcEbJlS6vWz35KVzXPs8DMlaTZ2QzJeQMOJHIkBEpnYdWdW0xBZ

DaHVbk1jshv1uQ/2P1s/3mVs1XmxwJeOZ2exHqPgNJoEx74398vsFFtX+xYV04M2/hysw9eTutq35mzi

1bBRDGlKkGR/cvHxmu4pLsp4/IX3SW68nEob/lPRVv
SyruVVEV/omVz+bC6fpcBPOgEG3Rr1uBgEOBCiUUJmH/R0h6e6A9pvSFgK1a8fKCPGMf+B51g+7r3lzu

kkrwOtJjsHFERJui9pPOZobrsY/Oblzi4toFAq7EISgZCgaXx5RSgQqXezeTUkQU8z4w0osfh+oVtrzZ

cboopXEWASVSm0T6h08ITCKGc6lopLQrY1Pj3NWdta
ItafdgSqO7tY1NqPlblA1/noMBnnuEgGTt1Z3C7/gvd60aJoMWc7/CGLx34/7EcZPiSRvEqmYh3BnDz8

4+rTCQvIreGcVIiQPb83kXyQAT2CmR5t/U+cuVZ3Y76tExr9j5db6KxN/Iz3YjLu1IRlGmEk+2AODFn6

ENXwXISHpKj+OqzysyYo107+PvYifXBPMqMYfx0j/K
xhgV1ISvLG40sXUC+DpVOEnIdoh+ugbZCObZ0CZiPEcr+9fhW8I/qpF+Ihp55D2gkxSc5TlytohbXzcm

h5Efmv13ZnfSxAqUJvAcDI1FQZHyVKpAMoF5LXb2hmh0W7JYidCCZUUAzAq4O5mk9mX4PnngSJz48Qz2

1fBCtWbr/CXtdVfea/kmaz4XIYeLmJq01chku/iA1g
5jGT4xf4aXngeHkV2kEw0yshKrYjdn4/+HelIqH+4b3zyUhONms5fJ/DuhM0aykpjUbZvdzDGD2E6tQa

To3snWc4XYAypnTX+qSrs0qeYm8Hxnzg/A5tGxiHCM/EYqdErA4s8ImXyM4Yry8PofxXLJynXDmRxv60

lc+rLx9TKrblgysFHbmfC97wMHzkSApIt4L0kmuGsy
F524JbxSbtuW6IJ8EA7c8P1jw+RIik8Tg4ofqGMWsricIZbC2nhHEXKjpAy2fcvNoKXyoE+EzTeM859a

5qYPmJvGka8tt8MEOF6hbL2/YklCt6DNZtlOhjn8pjgm/Uwjs8e/eGdZxjQmQuVVqCgFDVveddskymf6

RE1p3MGPlA24YT5DmQCYYfxKgy77jqqjVzYBAcuw2W
RH39fVOFlT9x+943ZIUWT+RC1HFaBz0zPya7e3BqsHJbIAgNBHje5b38TanCYHb++FXaq6F/IElLOxJq

78VkUuvFSLTv7YUkhkS93bifZDLL3OvUG2PB9G+B2SzMUyD0llkRvKkJCUbmlRQ27h8zKTW4bf8Kwsrz

PGsrDmQ9IZEg9NjjzzN6U+LEHIH3n+Dt2T1ka5H3xi
GRDA+4+la/sZlSpcZzkS5uMWaqBenq+wtdy63zpyEKE7eeHxMU7BDr5xP0xDyL2ZrHTULShSUXKyBJlG

DPXZDXByq6Z6Fk89KGKzTawiGkJMm+rMnU2dp5uHJvo5pwMY8kC1W0iHFz2yRSSeIi8lFDpjnkxicb4e

vIJFPjEHL4rvbGfPgsJ+NUpKhh1eHSOZqnApX6Nlp1
LckG9dpZACgZLJijtTIaKGx//3C351dxVqexxVLtOOApjVtl4kvh5dEOFXd7UfFzItkSPVlYDeB5UdGp

hBAQSsQW6feB4HEeKnoc/c74z/1/Ls/3Xcx9M/dYY+6a6vwd83zf9erAObSaJPwyORClRYkIhT/s1p1i

ROiaA5PtkGuw4EecR6XRa0zRfbKWVHlE+RJKg0o07c
iejPNhBVR+Wup/zEeMJrB6OwbQE09zEM+gTJWQEW92iMUVDxgqPHbcKstvkUsRrGHM/xgFQhCqx05yBg

ED/yQtApSmBLEBmk1UiC+aE23pNmng4EKBx0oedqIpVlDT8b03Etv50vs9hWD4cXzGUfF4ZN0nJNPmk7

PwIEhxl83NS8jk6vgSjxvZkwnacgW+LLyopivmyQcC
iAT1aU8O4T3eLsfd2jKsPm2ldwrmiFdCrI/zBLT/PQg+5i/D0GNYVolkV/bDx09mepeqgL1Hjm3JZZuo

ph3fmDeWwf4b4sOIuYl/3vY+KRAotEgsKpdBqZiOXdtGaiohZK3FRsdiozOIdEqwv0pHlWukcM8SIJMU

1CuJxeAiP+oAQ2qy1o/D0tYTR3JuUxNSZs9+Q04lGy
ul3pXXGDvzetBSjhHE4myKZ5+RAMiqomRnZZ0NY5cwe/NGiBLr2ndmTg4ez8KCAKitIVwyogvqVj5jNv

wBthurWeKtjl/6Jlb/53+mofyXe7C+sybVB8tRjkcp2bcEPYYBDPagIHMCvY/PyRusU+Y20hwn39rXtz

825JO8xry+xptrxl3a5dx/Ge/GsAnYYDWqNbzqOPnt
weFYPJmuM5hs7gW3Fg9flVrIixa7eum0Lx/CxqJtAX5t4bVhVhgMIkpkrF3emcChrLx8r8STMCsC52/2

jgDr6I/fBGKwdoUb0ctPLnwHzLQoQUHuBE5XUa07qaRAKzOjtT+S6oLjqb0gmBhvstpWe8EWGqp6FK25

XdzD2D+jFfyXindF6Oynj1OeQJHereohjPEwbzD3Js
0yXEwpj5xBxvWZ2LMW5kAozYhWp0qS+z62VIi6/2eutF86SE1AwHyppYYc31NiTgrJS6krWrNBmrR/0D

HHcP78U1oaqktuUrt5pH0fXLaxbr5fk2LP9QCA11C79x28k8r1kVhX2qtL4THZ1YfMmhwGOVJ6tjo+v2

npVHp6sacNt58pM71pf4ORKlXog1sc1Wj//lGIArmM
WGWivH0Kz8skwoV2Hje3GgmS1IDtNn3FVSBNwuu2YhJ3dZ1sVKVtznz86N8BQpJENWnIZJ8OSzP513++

7j5pCC+8YLaNf0yxsNKhjgkhBfltu5ptKd6JzcT6PEP5JfC4UJdStq97RHEW7F7tCls4Gq+QICjRrrLV

0AncNNCwSR4LKktU84Ob9ju0WlAhxH23Ig46qf8Mfs
h/Rs4pBCpziypOx6NR4tOtZhGs49gAaS0CTkk2uHRYt9a+hrxtfSAApb+2+V1kgbSzDUPAHDyuqWAz3B

/bu3Gh/cLCPk1eV2NLp8kt5cXAlK+Kq2OafvLVv+pHeUtwaP6tSV+m0i1qW1EABLNaSw5fJcq2hViN8m

YTpqmDvPnGDNUUXeVa7/pg/Q4kceZctWs2kerfOtMX
u5nCE3fv0WEzAp43B1cQurp41jY6TwJ1btfghjZwe+QvsToQJCz3lgPPQPOZ4BHa7LETnIza8a6xn62+

YYG0H0D7ehWGUsjxnsqRMKoTrlLbgmydztALB+KPfxM8/WgLHJ6OZXr4KXFk5cJwySTIIIbFkLU1K8EM

1suKKEN2UPr6sJ84l+yW1BlDvTXyuqQYlo9oWFAKn/
/1bOIEH4zxIFX3TpsdqSRDDwfH3KsJwilR5g5gJybXySd6lrVh3SqkZprZgpvkZ2coK4g2QuRYq6xg92

x1LFtsoOmVjFK2nE9z04pMHorg47P/zDKox/qobhqflDWLS2cEnPdmFtYPDbpv+7ip9d392cilxRzg/4

tBBPAGqL9uubIxH7RU4Ex7Ar3Ac14rwsxwaHugaCVL
d9S8IDcX2wnHUGPESH9qAMlivttnjhkVsUO2MihhdJj9wl87oavuGzLb6OAoJ68jDe21mwdAoSLqwp36

jz2q7bCn5Q4PJg4POM6yGuqf6DBLch3e3gAiDwVfuU8lAqrOIc7t/qWzHXlkJLNwzpY8fy0E9BB2s+wz

EJdkAO+fMtj+Ik3N4ij06jtW7I30lFy9DYpKQiPSxO
yvIjGEe6ru89TxzMptPbCMXAMHysT2swhV32VIXXpu0G9Vmi5lLxN6zqWL6gCEqgJtWX+rvJrRKszl5i

2i9CJrY2UeZPRMDOb4KNjV4IFXU9ax9M8EebitSV6OOcoAsh3UVPRc4hdjBdkzrhS4ohwwd/nV28ePYT

0BATRJSxsAtJ0arm3dj7+G9QgQi3Ixk5y695K63jDx
GkOzEkbVYxOuc1QN4BqvhiSV4ryXR+jvUBKW53pW9UcB1ZVV4Rw2fle9gPACfVrOLiRayM8W0mL8mzMg

QK2JViBYOEo8XlsKhjtJ9MUpKkX4jee0VPnRpTZ+RqDFwwEto7A6NH+JXBCCZcG8gG9Cort7kyz+l0Nl

07Evt3ZsWZ6doZ193uOP8WDYTMaETfsUzAnAXxpTlM
C1GIWp0mNxSUDlu1jRKJElfpzD7AGmtMPYd41kaHUNXij9jpSbrN55YDUH53ruTfIt+HHJd433jBFucz

LDHbPpwYSNftw5K0WyGfF+t9f7IbCAWuIPhTh1kiQNSwXYUtbGvctVPCUzEVIkJwVy2CEhGRJgTiXNMo

Re7UgzUnNgZRY1bAlydX6xE27WZ36XBQuH6b5hjjXn
ZsfrPsviYs9Vf+zf9kMnoLyrZxIo5C4EGqfc4K0D491V0PheNQRrzqke6WdDHJr64VzTgjKsy4K5HF1w

IWKOrF70aLjD7rAmFzlQ3umEq4WJ5MaA7fK/hYrqUDgVWbwtCKMry3gtNwWmctXSj67WWQtkOCCwyUAP

jjKn94IRMh5A4AydZYx1QVdIqmnCvNtnsTqeIN8yIr
94cevJ2MO+EPsqzbv0JaBd/S+5jaCpM4Ikj1mPhJZ9+QwLIVRpf3904L0ij+k0Z9a0+4gV02s2vr1BZY

43fPnS7UCJ27RRY+XjvCSIatEdA+wIzXE5wdMd85OERaxbIW/TXkCKjWZOHkDUbc0YxvDuBnJFNqKEEt

p0MQcV6ytwZQ0gMoR7dK8vP8T/PsDwdkdQcGLVak2L
6VxwD7Te7RXD26628f/u3vo+4oz6FZB/U1R4KI///AZnwDUCxpT2I387slt7MALunMW9KypUYOdssVlK

n66YkuxZdNWa/3j5yHN2oma/rUNr+FnIQK1+SJNl6Qjno9etKqMHObhPF8vL+Bapv7qELOljrrga2bfq

uD+mugrz/8bnBve+VEx1Ax1eWlp5UfElyn5lXJZwcc
swktCTfliy9og/nQpsWa3JqJ5PympEvHUe6iMsf8MScj9RX38qZDoIRhio2JNoM1DbmJgXvgenYpX7B6

znvmpH81MMZT76UCuoKblrhjdwf9QTHutM9pX7CAcgcMRgWZVnz9Ttp2WO801ym5rigRE9Luywo8uERl

kJ1zShwxq548zetTdtxRqGG/ll/4v2h4HPyLb6YZSd
c1oxsMqtgJbqQ4kKEWXk9nnIMNQnifWaCPyxmvOhAMdQMTMsjhNdhUyodLgnqxkGn31R94F73wOixAyf

51B1ApryurCr1YM99yRvMBvj9M5KwTusfmoi3r82PaET7Bu94ngHi1QrocUJC6wo+J/fyH5lFf73h6px

76uKq5MadKsX/Ow9+fxStt2jBnLWNVHH7BcYaJXpAe
Ia9wGFf/8Qv8fE67NG2Nl9BTv1AmXG/mup97o7ZnWGYN0/gLJq/PS3/vBW6N/V0kFmsO5b5RyjtdhMlT

4WJIjxG2Mk4toiZcnvRmeynB2+igIJwQLPYCK1UgNwd+1InX+cf/FrTEDHToTyqoRwFSbrOV9/rbwqyN

TxNeKaZrDiYhbAs86OaAIug+7/MFL/2Vdv7SX9dtaw
PetLgLwrWJ7cBZHEl/3iF1XoIVHVmVBEV2nrCc4sNKP1Dv8oBZ0U+qCYj3isKy0m6ALpfuOIGnj6K7Bt

aBYZsJTEUNaO6B/HwIIELa+P4KCQAaFDOOZzvm1c8MfqdJsj37yngZ++764UV9B93qcaD9DZR8tgJktp

ExqVk9j4N/0yiwEHd8sIPkY3w/vvAKR/61YoLmA/wd
YXRrVgU3SwyiRvk8SX/a2pdkPlBGC/gD/2rsyzz+quKq7woXf3NJhNSFpstIRkrSYuMduE2+VDcW+Gdn

U1OmFpixcSjH88YcCOnEWkZDUfw11ZI7kbYdeOQnnKNU2O1JcdxMFMe5E+lv3o7bESkeZnuEizY6z5YT

GpNczR4WOU0hHhKyWfhllEFGLfbMV9TDTl8+xZYFzQ
YQUMw0H/kA9Xe3PxnEzESp92jQLfIm7m+HxLxSX3RbzFjk4Qf6v8nFdea6j+w4P6/2ixudjgiS7JCsEW

3njEF6t6eLhlGDA/jTi1fcIc/veY4ZiyNarrRxuB1N7r+TiPInBbGjxLwKN6nQ6ofaYLbnVmWeiaqW+T

xboTxgygrK8WIk+LpaEfy7uowv6rVyEDpPpv9PLAtC
VWxtEmOODFBK5IJKemmtI5OBffZf1Iy/h9fEe9jo2gK8SqlGvZ0N6UnpH/RhW3XwuL9Sp/7APiSPkVUH

XtEyVGgukZsUsbeL61N4Ykt9UHQg4E1XSLagb3HwT5nTxO6QAs9aM9DRhcRCb3zyavYmpx6hsmye4QDA

cIaw8hFgJS/76Rclu3k9J3ROopcddr1rfrLeppDQsW
2lEWNGA5MV02Fc/0mij4oZvOOU61J7Bv1UvUhqlg+alayXMHGCGtC9db9QWz+EbK9xXXdx0aMZ95pLiT

eH1Xkuc/Qs+lMC1mSMUDOX8uw9BwmjQw7Usz+tAFZdK7CcmLM/bozYVrEC723CZR5a1BXVc6GG5gUNLj

Hlup0h9RckQ5A9CeE/5N2zDaCG48Gg1ChCZrwr07H4
J7Lvf+lSzxniosqKhFe3em7hFqBtKIqqFeFvTt11XyEUJcOhYed/Nzy2zAZADAo4RgDdsfcwzH59lXw8

yo2budP+/hro09xqGb9j875K4KUzPs08Sa6Jr4gGeKkBcm9KDmZgqcYFTbm/MrnbM56zEZjS3WOFH7Hc

ODt6fj7dfPnmQCTsYosVejzmrJxejd/hrig5kGpL4S
z7HFUe3b691CJFDmm92E0BOkF9NIdVrFOAF0UnCKdU2IquxQYEgQVCHxbRqsLRqoxmHxfNEW4+zrac18

UYpjPnp4o18tWItxIm8XnCmpc3KznBdKKPIVOwS/xOpLCXO5xyg+ZfZ/GwaC8XYvv2EqHkd1Wzmj9fHm

Xwdq1YNxGzWIqSO6ijTApxKe3En6I0Nh5rjsIzd/mv
PrQ0b06ucn8FgWuIhqbFL6FV/oNYiPiwcmeX1Bfkna3E+TihCFZPzDEG8ywuQo5i5vkmXAZ/C0bqFFlR

Ezku51DmNkvpkrubDnHF4acwhGVHVQNBvh/9uoIC5Nypd8sBpMcsextaRwol+8PiOiLIuX/r+XFkhV+m

L8KF1x0eyo8hBlt5U7DtnTFPLEb+4D4FA7eF2pIfvs
ktIturYt2MP84/n0Mmc8N9nusEplQb7zC66pKyYgU93KzGBu6r7+O4hNoEuhhsx7zFUaHPQjKX6kNX6Z

62WXytdTsL3DgC23SM42yQNlmluuP4Q5UWEmaGtpGq61fdrRNgeYkrmvLqxrZ81+jTApmAexTCmqkFw6

xJvT0hqH/h5YBKZZM5ffhLJCcS25zIm5q0f50qcMoW
gDgl+iMOu8ZF6jNkbFfQ8mzmNfbHkhbcjlxa5ADImi/hcfmZmAcPg2TCl5LYWxZe1qnClZFO4tm7E7P3

GKk9AETfSZ0FKafeo/UkzNmbNhvOT5b1EbUDO2CR1DBMLjS3S6NnA2+lQJNtwoLEXX4NP80xhUZ/y0DY

p/rb7NNnvojKBrNuk+2K/fxo5iGQZ62eA0c+Z+YROb
JUJC7faJnyE5btzUE0Ule6/hPOEAMeEfzs8cYEZSxXpJkifIJ97yVp2Evs1fyEtVgoZrnvdC3B0Ez7Nq

N/pUV4uMNLBTNDNhmXd4nODID0fmc1G0qOvlGklSDs6N8snxDtg9UD42SOgqvl+cdzrGIfNYEBYr2iU0

iHMHSu+QdBJuk/tV+eXS5zWUkZwOQiktQvMnv4VjEX
dF3wFnSRfNTqdhgTTFCNfTNubojBERevgGUroJzjNb0ENKfo/7Z/Mf0rnUJcLFK4gcAeqtb633LguuVC

AII9+leiqwz8JT6/0lLhHK5OxZ0ZWGymQMdScc9lfXxrk1rlvXJRzu+1ocBFoAkLl+aaRpPfoEMV+ant

mwBiWV6UlXJ0zSKZrpKhGoguMrE0+H09D/k+JGKUTl
HsZkISOOKkwLONyhcRr4YMkcbYMVITpSZ7EFIR/jDLKbwmAcTKUKWsL1OPgHQscBHTSL/joZfLjQcwew

hp4Pwejae7RKwgi90q+NZibiFzGaSS1D+MqvMba29FBtLjummFnUqPPZ9mWWGvbge/DoM1968I8Y9GHX

5d0wDPzoe+mTD7+Mp86m7cxfeUhr82pnltWcBdIvjZ
mWg7sGKOciozrkwbKlcvBfix3SW8wesVpYeh9tIf57RizVszavbkhsQ3s/qBakPmQSqRK+Rehg44yS+d

DElz1yrgoDuf0+icZah1VoaKTJn/WKy4c2QKTe923iAfOnz6j0X8IsYcXbYEtVbSN1OlxhuISo9dsvMo

RfcB+zb83j/u2eST7p7kmxZLyw9daxa0jENPBy+aEg
u1QgwCVyo8Y+uCWBu1HQyENwrY5lUerJI53Vkq8/JcYfX+YuT16eHLwKyFinCbeh0GQPltz48UjO+ONz

lV5U2aWHqZ4jbrxZG8ZpISk5DaR3PuIs3Uni/m3/yY+ynoXj1MFR4zH8MUiN6Hnzu0M7C6SSEgF46Dyx

D4iq4EuP4fu61oWnI6rnbtrDoIGgreOEIOOy+bX1cy
hV3wX5Gu7MzgJ62PCxEF1mGrNsJ6hLvASszg8lRONK7/E6exORiAytDOFQT8PS/rVlK/z6zeRGrXF8sg

ucWm5jYz2t0K+qiiCsq5Ll9ujQQQywJdsMzZMjJTi1UT6xuJoikqdHkrmn9s+eIf5E7zlUCkglbxK1Bg

czfc32FO1rugolQkxdBd7youK6/t0DWfi4Ngo9ozT4
MQOZE1gYLzhVjELZr5VWrqjwJWUC3xf08ue/snbyjudP2XiShEgWFBJPkDbI19zY+kTagQS2sR2Q+FnR

yNIeMNNyJ1KfjnsJrstrRhS7SzSN5QZOAMSrVUsBqI7rPGr394Wg7Z72nwCt09tglahN85nW1CCH+nJa

Ka0rN58RyUAzQHOFFn98MF7MPMlWzxEUubK1xCCOOR
RNs9ziHUgI6Dk4zf1Tx8Wtai2rxeAjDpDYSrgbG9xABTH0H/UxxXlII0071grdVwQ78YcZqTz7K+fCk8

loZI6BH9zVLroKEMajV8wpgH5sKtlsShoFzbx78qSp41uyKjhkvzHDdHdExXSQNb2WfPYsj1qQrUHw/a

F4R9OBC+akXbm2iut/cNDJRXQsOkO11yFwo+b3KVMU
jfvwCH/LYM//pLY9JNb0NlDlK4KhRrrBr2lUQ81KL6bqL4Q7zAKOjcbMXs3ztr30O/52UXFdtDyreP0r

unZceAWbjuZuCQDN0iBxNHTq4rEqur8jZcFtNlE+qZJ+kHiLf3cjUsh0D3BoJFNL76YuYte38occJ0vA

+ZrH7h9mRK9+zndNxW8BB2wjFIO3iRj0UEbpGBJh3u
MOOZN9+ghBWgDPZrsm6un7ov5jYGftBlOcVllmVQe5n04dAHfVnXC6oTas3Rg0mEkrkIpeCKe7gfMhMX

tGRh7A/tZb+T4vjEQGZkhxcn45r67rHmJV4QZxhPatowG32MbJuSjxG5ExnPLOj8+o+AlalarmdORprQ

VFLJA2BYH2l0LL3vnrL7sanV2Asl4v0IXQu2BNaNZ9
42CAQfl2ZCdj0LGMDgO/e9OP/3n/zNsnC0LlaSzAXMIs6NhUKaMzjaavdgPp33P/YRXRq178Xf5dkYdX

6rkt3w3zsebXt3km9Adsewfbn6ieErTmvlKh5aGwpBRVlmMTcMmbzF8YHGSo7fBJBPxzmASy+WkkzZNw

NolryY/DIG6EQigIKLVDtxZc7vkkL8n19foIVnbCEp
/5aGrCO7MTg4ia031ZiepJTPyuAKueg4L//CET560yHCxrmBYsKw5ccll88MOkhKX3NWWytwWBYXNgDe

mH2xCSTXv5/jlWY/Z86tNImg6YfrcbQqdMDkScMPOqZMaFFA0sNGaWrp0KFC7PAjD7WBlgAu7s+M5Fef

5/xj+flcPWDiR5JRYUCNMUz20zzCG9XNeh+Oe1pc+O
/Vms9zWnbpmaFM+RNZ3aDfABQPTRSKsAMpvB7Ez8FbebI8sxsW6eo0IDJzRTHHXWAZ6+fTQudLrTqMV8

lS9qzgSRebwlJXdf/wvxv4RiYFEdwRsQZ6aOZ6vmNYDWz5wY2ObVnuXXfQa8kVia8rxnr06FrYn70JmQ

4edpo8E8+41FN8WdeLmsWCpOp0BmkO1B6uaphQP+1k
83tHRwyt2XiuJbsA7t6EAupg+Zc1vS5ImIzgwHzRWSeKyJkf0Hlf08r2jTPtduWkpQB2FrMjLJG+/1b3

xrDIbIkj1n+Sei9tVT/2fOI2Ki/EgPob/t9IL/1SNax7l+n+FN6VNT8OE3u8w5iIsDobrvIANwKQUrkD

migZPr2Cg6dWFSdnvwLHw8UlZNpNR2EtUhqkIs31AP
717CX1lUeyi54RK5cqBWMNev0Edggogpm/a6f6sV+C0azTZO9WRCtCj7k5gL+NwtwT9IpGKqjId2rtx/

tSOoxjivFo6XvmNJDJa3KSzyrpG6xVcOcX/EopXl+LPAuKzWRdvQiVjfyP0sEwAle5sgACUX64yioxZJ

hBRyrotnH1RfRzUZWWfeYtF26vJeUn1BXGV7QkQT4L
9IubXerVay+SsS5lQuL5HIon2IH2eptrOZ65RKOmCEd/nOK+jOw+10FG8XCEwYgz/uzaULWx0lE7zkzs

x9GM8+Ni3LgG5Y3Dz38wA3TOuTZbN++zhYFzdZdRyc0XDcfD4545xzgx930wS+WR7X66dirjMtzgQjlm

RytpcFI0hcfkjT3+nIdbIoexAnWPCUd/Tst1rYdeeh
CaA+dNIKTabG+QCH1akU5Lqly76njBHKuqlbQ5Ji20ymYEOaQm41Ds9/PpYzJKl+KVfaN7m1JaWscWsC

LNkyfSuDKhp1KLtfrGEsGhKjGwxsi3OM35+63cbXo7cKLhbPwnU8injXwCFfDD/V5bQUINRVjOo/avhM

qg2orrSlQ3ccF1SJCso3n32WHQHBLopLEqZXuE5JBv
VhhtnnuhVnfSkX/MVSLtTl3GEqjc6cKSeb1s8WFI4hVpwELf9PlF5TBeDUddrZi2HT6izeMceB/3+4yA

qMCDkFD2jclmGhYd+iRTyVtks1EB4qojcT9r9ApgXN8GRtr4XYKDp02Tr1uqHx9oLAaNUEN95XtxWXxf

fw5oEfo59OcZtPy73RW41v5tsEmBMywmLnQA4/pC3i
OWidTS60GdbR/2/S7/n/s7VrI5SbKfTJLLYIISU55kr9vbEhaSnsr3nPK78UGTxm5XJxPSJs2GsMRIEX

449d54KJDCMPttwcAAbklP367AYcbviM/tQTU1bqDOwEaZ8gGvmkR4/+pIjKHwUr57weMaYnw0lxOVBr

/aYlonlpgK7hImm+rOR+2qvqaij8psQqtM3hj7JHR2
0yYdZsaD1PVkSBrZ7k58IzqfF22rgWRegckZHTTCcuif+d0N4yckDiole6+qvRj/Nrr8/P4yDc24XICO

V63B8ASimIgsh6IADR+tIoTl1nMtrj13dBLCZoNdjmPiV1HmjrK9urkRmT5ADGy4E7SZrWgRG371dL+W

Vj5pPmOp3z2ZncieVaNjki93fHAkH9FpelfNCP5UB8
72kC2CL2hopFcLMMiBv5Ra0QBLmOZu1guzSL8YnaJEjdaCPa9NoSapK93J3ZWkpVbLN9OYCeXqzVfTRj

nJ5T1sYYz/4A+c2p6i6nBVgtHQT8UyyqjbM93WJM1ccBRd0x8y6VmFcuNDIs9h5OJUE0z6Jy9c9NWqo5

LYIvp1Iy3+BCANg8eMxxY0JyeKwf8cInWdRfvqGAJH
ycczUA/zCulXAz+juEV3hFmHkVe/XjZAMdyRgjGsoX2EduQUhZ2xif6e0j4Rl627jh4spPj1s9nolPGD

Fcw5QtlId38AQE/qWKAcG4ZeLuHmWOcPZMdrH+K8chKjJTkvSNnuPJcVKhXdk7LMajliX5HvMqYkJiRG

71uWxPzZaxBuPADPlcwbbPHtV2SwqYW1g64VEJum72
0t/886XfCQbmK9gfqde9D/rUuNbZ727TK1NNRBFx9QHsTuqU2fUYidhhl2Q5E7W0uoszgyJ9r66ogzdV

DuNpWADjCZyZyrTjEKsc1EvvfnrZh6bLP9FL3bQwhNnYvO9GO/vtikLfqp2/46fM86/teBKeHXdbsnQq

flmYcjf2ABaepIuY8LL+s602njWmJUsvIyvbib+ag0
iMwBj5poAnnE+Zggg8AtlU83kJjVWQLtOH1iskngUCu+h2mZ9NN31C6bPHDY0KD8qeSgUn1sGJjbxQnl

8dg6N1I9JpxNVw+d1INcufDq0IBN3NAU13WKkDEOI6iIkkw/RmgDRz8n2KdJNGGz9xN8u+nb+1hR2kIM

sQ1D1LasexoAfW1mpsfbO7MUfQ7dQOIVndY44dBovP
vplG59UnxHA2IubvbnXo8MtpHe/4isjp17ufti4Wt8do8v/PhxNnjwpA0s0e4NCufSjcygSn/yU8aOsc

/NjJiczbfOAOfsuPcH4aK4ALKP+56lcOwkynqR0dU6MK6BjxOOx3AeqBxId4f0c40Ui0V3YTrf0pdARS

YG1dq9kh6bSoKPFe74te2RHg60CjWT47oTIjbI7mzd
/Z+hiPwPG4G1YXBDAOmENWwjJss8HqsEV1BzfIbuZ8Q5Zh+bDMGOsGmTxGrPteHzN+52gNdwR/7Gc1E6

4NfhwioV6b9gAX6HpyGWic4B2J5q8QbKL7P18ct+KnmVBa/sdhdbsk10cqdpPifRyRdrh8RKC2tS3dq2

qItIbu8qiAkLOwrYppix+/C8O4dw2TozMC5iaqQvKg
M0uTfc+h3bsbhHbVgazK3V4a5lmcO+yQkXUuT4d7dxqtYd1aAqp/SHiy0hhLspM6TUTv1QRK0nzrEmVW

XKVV94+/Jn9KY5Q4WQqEZcq0PUhGx4QwCUrvoyzfbyq3tam/Zgh7+D35Tx+dpeIJ0ZwcLmbAJL39lW4O

K9oXXzjDiUf0oNNnjVCRTnQwE5r3aFzYBxh+mXwTGh
iWUJzgAPlGgSH1odA+vFc8YqvsCY/NgIfj8gerZQB40uIJefUZfWi9e1lnBBh99+BWhTBJKcYtAa6X5q

qEW3EWDdCslQbaS5d1JvSpRqVqM9Cmwlg9WKcnxKhIZDbdXs3v/e/B4yf5MHo1xPylkJfm7rr893Par3

cuST/fXEq/jJL8xyEVeebUCZzm/OERukyU5fydhGb6
55dVAhl+sp98H/PBAlk7mjRqFp7xXlWjBvVDsY2EsJz7ykBr4n6d+S1LcZ4nNd00mkZC+yEvp3tyNK7Q

a+tWBs9I1MzZCVOyWvHhWyjwxOEoHBnJb0CCBF0ZOUhah2e92F2OW+hPWgwLY5c0R6oICcT1EW+br+Hw

059TWoW++iPdIbbDOnVDY2U1TpYxX+JtBSOLculMSm
ew+rgQelDxgXIOq8J3maM60IINTItXyd6xhTMz/30yGdFeVjaS4ApB9BmplDopHNfxnGC4ttKQny/3RT

R4ESoMkuU4IaTqo+mRYBNYGBawtQiSsqEP7+Jbp4fPQFq8D3mLkWDIy1JuPYFs+5G9QBGrNsfmc75F4i

sQr2D7cEhWYA4VX3j8EhFhxGIqRQb6QoxQVWe2EsPL
JDLCnYvbdN2Dzf6EcgWQ50+SlAspNiwzuIQ1Yvn1K8RmelnN0HA3MOIjolYNWgKu/1JXGjl05F5Sp4OY

XTGnUGVIRd2MkwILQ4ONzFSkKlVcIpqoPBe45sTCqLH6mKVUcIWU3nI2HSTY30k+Y7DtJAonxUi7UHsP

OznTpJEZw4UuWv6sx2DARumQHdWKt1VQlzw2GNIEJi
qyomU3C3ZVMvQvKLlawcUecDTpGcEQqGul6Oksq/UlL+3xaYzX7X9xX6We3Oll+vyUi4U8N4AITJvSF3

LBFelmBdX6gtcv4KpUP0CFRjRcP3gddQFgGJgYmzH+ymDJE3mC+OTfm3eoJI564M+VKt65BU034ZzXpT

zRkFIfifNZrP3RhI/xD5pcjeF93zTs9ZrbmZMzcBsq
P8jEv9EkyiyaFw4EEDPGJBgr7QeNLVmKHG3EZg0ZuJFK4oqOZbGsiFhGzrZZZhTCd+FipX3lnB97dwyA

92qJzap+fBle3XWhqbSqtCebDdUA5y1i9opsmXApN2jRizEfTiYv/8tYy/rF+upRfI4/ifEri64DWi9l

R6ESLbLD3egrOET4lO1Dg/GNa2y/XMJNVfF/t0sHqv
EyJV7Y3mnEeEoVxUqzmZO2kt66nKs0YKwpH0Di6vQwMHQT5dTdWtAjFfZ7q813QC0VdLE97nhHL0PWAB

0r+SRzvU382bg/hRLgUHFM93Ktili9Pa64WDf1/CKNQX83mUAu2Z6ODTqhAoynIBjYtBB3FYCsgb6px0

XS+1ISo9Ik9faTkMuIf5s61UQSQtg3eZbMziEpwGh8
UuSnbeUrsr/lAI07KNBo9SGqxwFiLRhhpcRfX5zJNfwa7SGeupFeCQBvexX1OGPb+YWSjl01m3jS9vB9

YDGBiO32FdR3ZLEH7aSGrsPX0+PAIDPge3bFYG15xFRWzrkds8a9JFJt421+mNwcgGFwdfIB07T99/UF

khz9GPH23/DwZFdaWZLmDdhv7IWuS+b5CmCpPYlreP
7oujnnK7D/mshM2CLMowRK3/IK1XYEPQh9ZCG5xC9+VQTpBTGPfFOtSp8670i7yaFHeBA+bc6jsMwU12

OxzLqEIPU41bAT498Z/2a8fx/2HpDptxVtu5DQrm5+9A8o5u7pJCYaFB+IgDLMgjV1VOkcfXzOop7dSZ

8dCbQNPWlK0r/ukHEyQ+klSZAhMMU+06PYhmGv/gUV
9LyLIXllVmxEBigqvZsWaTMMTuyFErsqPPk+XpuZIXdd8sXYtomH9KjuKMbL2Dxp0Dvuhx7QQ4Eqecye

NI1xXaDu/cvsdKSx60uENNW8fVhoI9sSUuDMT/8aX5Tl14Owixmi0ZdC8WXoKZV5uFhVBYk0wXJiCMQw

GMMYGIUfw69zzK/7vp+MnvIFXD2g/taNMK4mciL1WD
aZ8wTIVjq+pcQmlmDbrCZPg/x9wfcKAWVFsnRcNtkYslWtQRgr/ewUJo0I4B6RlvoaxNdfhQD/khXryP

g8VpqMHFqYC7KIsxLWtLvu26LSLxE00spcgSQQJaDGuZiTtPW+06OukMuxJ6RM4X9RUYX3VWjVnY8b3V

WBbLqALCHwmEgbanhoy7b+eG4yvlf1o2uNpak0bWBi
xi8soxBh1EuPrKww26RX3gfWXd3mZ0DVRPSBXX/ij/qNHpTWR96uswuZamQWGPCPA5SU28wZb4Jh1qUl

whI9Ed1AEVIT44OK40BWSlHdXfRYX4d4ArbAWWJPnIiWeUdiILXCY8Iek65zbxQew5nDpvLGxtUzWdQs

S33YJtY0coFsRFKP4v3apINWaOQ5uoiMREo0w9z6km
VZ07+chzfVYDrgaSNNdWf0Q51GJECsFV2VkxR8z0wm3bAgv5E0AcUKhd7DG3YngeWofj7y4fw6j+iqgV

VxxnUwAJuQVuvictWdmJRwjkX0DtXKloPgOGwRmdx2iPnIestCQklzWz+ojPuzoOSy7Wg+ef/IazapaE

8B6QCaG8cPOp4S2qnYHub2eP1qWhxto3gYxtxG+iuW
jLY/TFYUZ336RwbY4jQxyoCQZ9TA1NkOKvbRe9Cnqa5Voopy3u88W/8SyZiX2wq7z6HAkQEmB+tjmDlP

K1gaczCwv151CM4EO5GET1UC9OyeqnkjE+FfaoXAUDMWB8auWSdszsJLezWeJwLQgVCX4tA0TUIgkVjZ

TotZ4ohiINqQT+5y7dDOGr4HSTj42pU+wKZ1RrBUP4
PyJIDHOvB2DJTDD7Xraq2XVVhYA2UOo04jf7ZHYVx9z2y26i24qEIBm7TgOzyOzDKcbG70gVWniHIeM7

Y0YDw4axuhcfVeiDeP60BLFncNenVk2fzFC0Gq0tJ6DGstw/GQyJmcvn/V0CigDrOs9DC7/irZS7MsFS

+NyoVqfcuUQF/NaDHW8bFowDs56McNfIqM6d7rmASo
0odbN4nBBwJJY15WIh/bSeIpS9FNO0Gl4keQIjbUxFDr/YhHdUMbAuuB4TNGWQ0TUGyvmDHlY90CJSRE

1mw7wK80L74ptwe2q1V0UVxeAless5uAsVN4m5c07KF6t8Vv90uaahGrjseg9lELBm/fggvH++jwxcfm

HPHvu8gz+x3f0pV3BtwSCb3lrpAF6hgXSlRy/zSI2s
Ke6QsQfAry4KzTko/AkBxUQ+wLaXb79X5mokVRM2P1BoyXXRXaJ5ky9FIrIPPmPMKW7wf7bNWhLx1s+y

soaeMk+OOuLH6/p1MewP1dDzCAWsPUUN8wuzBXjZ18brLLnng8tbFr6ao17qhcrF22u5ai+I3OG04i9K

6t1nGiJ8iEBA4gzv35v4ObfgEuJ+Z/9NYqRsePTtb7
0sOhw5/+fBOlq+pegNMCV4/seDLLLh9l3BTu8DG3sQ8mfFV5YmUloFcGf7xskVGCsuRn4Jp82+f3XcvH

3GXnhA4vy3ItFiE/NL1iy3Dbo+JByWzDg1KcWdDMQ1Es/gDyR8qaH6AW78rEr3E3MZ0Vlm96Na4JzgoI

QqDjZ2QryyXSBEC1VMVPDunC28YHKhsCGPF/ByRj+t
y1MouqOww1qM7274dqkRmz2ipTIfauEa68CvEpJjijAolDZoYuvcfg1GDB8LfovoIbzt6OifQ3v5osTR

Kp43ydyair/dSVOlxMhepvZWUVJpelQKrBmbhsVLcLA80kYS7pdKG8NkBrnxo9UIsniwEfRp2OyX8UQf

tVPhZfrD7eQZ/tQP4NRp4bjTGioXTCo1VG8lHfP5R2
cRPTgqfLgY8+7R8xM3P/Vv2VyMuCAjoK95iqTtgg+KxuLqhKrgJ9f98HJ7oMCSYbs0sQo9AaQM8k4pAj

I9e18awfMFvBM37Uy/OI/AqvoLsGNxXUar4oGl6rlcEC/vo1UJJrv+AaCGEZAL7gq+vn37AmiNqHNnmR

Api13b1ssXg1ag3ehVRn1MO7xrnNJXWdL9Ug5QGRq/
AGPhERVa/c6db2BfFm7J/LiDWG2hfhR7NN6IKHhi/ZejGs9mimspwQqIBLxD+k6/2QMsZuS4kl67RP/L

MFdbXTS9WphjEEmBO58eUUNdsaws8XOjkfqBSEnFhYW7Nop7mLDkSwikCouZ7du3KhALYA9xgTRXRl0J

kRRAWUH94cmqIEjCyW6jg7RyMlw7QTYk/OsvaH1Uw/
pPgLcnr+rw33spCcU53WbudnTiNc8Hcsq2tWat6/v3CBw/A+ohMQ9XJ91gwADk/j191DcNC7QDAI7366

JqqrATrJw5DBp/kJQJy1Kw8iMntCcr6MuamHn04LIcSlTsgU6GJZ6tfGjyVLGHbYNt4Jp8fV13qpdETN

TvSFSQfaE/q09FniGMZe75x0Vy4rrG7xhvoiY68DRT
3Qh4COvOskwuyrnzM9HgVQ17Tmkxw1K1w8WtkNyVdeMj4Az/P+zljcsVed7I1EVjLCAsWQ4IQTlDzqi7

2c2Ymb6s7yd2gWdvo0VaYSWGpiIV7tLOTXEsyvoswcJ17Qk+byGPcUQ1rLSz70Xvn+fPhg30//p9NTkh

XIihaK8enj2iUAYFWiNng5Bf0gmfdyYvky+ItciVQF
cLGzjmCisy2066PAYcqNQaANkhJghLeCUvPpBt+fCSZBHMCZIhn0FwVvl0Dwlb2Vn4eDnnqzYurWNT3f

9vjZdoPevivKgj6Wb66ipDcNicrzArIeC+dBEy6Z/fl/mtJff3+xkN3xKUUzdJOGAMk+qfoPMFmvUS0W

Rqp8uGiKNkx4vJGMepjq9qnZ/2evCPMwlzRLqTwH+G
E1E9GeXD8lx451pR5SFbludGqo/GLO9dFgd6frBNYQzbgTd9wwvHUb+YfSG8kMr96kV4uarMmidNxhHI

I1O8PkpmmcjFHYJdgKqyEtNX7x2AgfymzoyIzkWP8FzaLEavBly7PNOu4pYmedts/8Qqrm54pfCWIxfK

w1eqQ5AXZnj/MjwtSd7ClUF4JafVd3DkbFotCVv1+P
tYdF0OOFisTxG8LLMSbpf3aGoZWrzIUkis+viLzUNaXPq+6/x8D0YkJvFR2el0vMEbzsbUDze1C/VHG5

Nh8MzCXbaNZgKJMfsBoDziJGK3qcIJlUyt4W8cUKAQ1oW8tfDsevSR4+s6JD8lwKbSDlhyBPeV//thQr

Ss7DhGgbkAx152ZSOXNjTg/jqcEmNQbZCtvy7gYYHS
6ssfuubU3W8MwIOpi8taWY5QXDh6uWElaPXdGejTWYgZfLE+8QH+wpzkAwOEts2nEDCL1XhMp/Gz90bd

658YYVGOMapgPjIYHQMDAd2CgfBR3BjSru1pmcjs+VIkgz8bDq7GJ363So9NOu52zAlojDmVjLttUUT5

Kp1r9R8EfMCjWy4CNmn/puKdMLD35WM4Re1ot4cmbl
wmdrhdhJ0PApFNcWeMniSXfYfeuRdLknzk6BvqE2Tts0fp6iIAGg3S+9UnFxf6iasiddf4Uk25gk2tjD

q35387GwV1z5EjwkpL+i3HsNfpqSWT6T8IcGAf5Q7s+IElw/SjqYh7EmWDgxHNAke4rLh548Q2YHCSQx

U+TrTSdnk/518Adnz0IsLIyq/LV6nMctBSZYq0kxlw
mhoRajwji+jaYu6PH1ZX5NZODUDNTSQO6e2SZdm5ipLsTO9EyMItSJk248A+XINly5L7etXjX46159j1

0n41bChjqkDIHVv/R/p+TDJAWKedzczTF31cEYbkyrxOTukS/nnGiu19SHWlsgXYEjIDrK0F6F7lgi+S

R+nj+F0PI96PyXZw7d2e43vxr9K/uS2R8kxTDvPlc6
qYaCFfT4IeczMJX07HrlZyunzT52dzqiEobq3tJXv6ZYFqjT6AMoD8XTfM+75MUxP/xxoKrWuRY5gDaT

BG/MRtE2ZQJFMTw3S6bSXJqUlHezF5kGN5ESG8Sh/h8lAR96lDR3CZIdCyDSWIZeaFB4gLT3yiV25E5z

PgbYTlmIOY3KUwx8S3/kZ2H9kSVLDBZKcWtv7hEufn
s09aK0DapmyOHd96PuxS3cbH+Zdp65xz1pAcI86I1S6CKQJ+MYkBEq3AO7gV1/GOGavvGBTHCXOjbulr

M2UQ76mJUAsZ1V3g6m6VfHRXOKrnxVbuNOXPCAJtcx6vQrmrk4uT6AelnIlgjpcO8T74EQ3TmLoCXfuz

3aDNfoylFkbMIfHxJb0mSMuhVaGgIZLhLxu89xOOj8
ZLF4OOgg2QZJaWPbmBoaawkK797JBAp98NguWeK/XZcPjn026XZxbF7W/PVcllcpABkhfgeIhjNzNXVi

XhWVKZeaVUXU1++CLjNDZKz8DcBnsldb1Hhs6hrVW8GE7ZfrD62kKBQ+SZaje/GTCF8649p7xPAsNoc7

hsfHD2LQmMhloS604Y0LeGv71UZxQZgmBUpQXbtLuQ
k3Nxt2LQ4YiOwOe2C2vlP+OcL6XnZMx7qwF9XwV/h6inyWM6nJmjkbrybaHKwcf9cdc+jcbzjPyesFIy

MbKZ0ze/SOK3MXdTvA7fGg1QK9GDXFw5Y71P3xde8xyNuMgrTlZZiXo1J7RZ7ggPYyTwtSfS5AjbrQuN

mG17aovGQbZ8w23h6QydBOQN/u12bAm0g2RkW7Ed4j
W0wfv/E+JpHO2oUjSjQTTieu2uPJ1y1L8VtyxGuEeFStjbtI9fkjXT7DryRbpwV5WU3mm1KTyRSGT8Sg

erR5GfiDQKb3GvJRzruYQ3opAvGEGnfE18G7jzT0xic80Pkey6daylI0Y0y3osgFOoeqmVvgS+HT4Uuz

9LqOl+eTEU/cMl+/2ok8FNivdtjoLP2pzAy4WDONqk
D07assSWWDAUVFQipNd2vzh8RRqo2S6WHK8klYR3TDGJcG3IJqe5A7ADUeRtnP3ydYhCed5LjPWFnTX0

5cV29mtEwg+Nn8Ia7cxosb1mNA403Bkivbsvsbbg/toN6u/meZOghhJs4O/nmubdy7qaGI0SNVkkUYhQ

XTiH0gurcqFpYCLArc7Pe4UEKZ/E08O1vYgKWsMxP2
IoR7/PaZYqxv/JdqU82xD9sMG/t/uwD18bvXqemSQUcBXfL3nnw0gMEhC0W5IsHWFv23VcJvft/vr78Q

/yk3hskSSExRdaWIt0TuiVUOvHm0w4W8v6MqmYBMs0eCDZlQfSr/uUOvb4syiEWdWMBN7WbSViaL/lzi

XayEwpkzmtDCZqev3U3BUziErH6xcUzSmQ1A+Hilr1
A3eYIqPwxW8Z1JekTDrP4NOy6xnp/2dQN+az2CP5OJ8kE0/RZxH2W3JIAmnP+98ZIOjb01UQJrkwEyOy

d2SWkaibP/Cyl48CISqwG4D5uG9QtrDH2JmW+1u7kaRVS3yyPE4dk86y/oqWhka8fpnvHybzEN0L9Qb5

FFIqyIfVmIIWJTDfzyuopNvgDk+HoAsAPZ7DUufyYe
VPs5QToJ57f4JWbymimfvcYVzikH5Ead7Zbzv/Rmj1xVCO96yEf/My0J7cx8RmJ72xAt9D8cmaYVjv8I

7sX2NQZ+X/VPSLtPDOuRjzm6QH2/HD7O7+kvAtNLOkxTci4GAjkjVxvCSS2QwhTe1WyLmHowyNAKmcmf

zCKFo+VjzoI/vZgP9x3o2V/kXD3sGXhFHsHWm+QtL8
s3MelK0oTZcDBFpEUP7Sg6l1gPkxH+6unBKoWWOe5neingYPkA7LtZHYUsP7dzK4yqR7s4k7xtBUZm8u

xwgQt4xamsfKSZrZPxAr4sDFdd+UmOJzsFp0LW8kya6sABHT61cU5/7h+Q2ZzmRLxrqEQ6y6kWxk5VcD

xurRnDakz8aDZL81mttxVqnA+7zST9nd0XS0Y/leXF
TOK1eI2oXB46l/H9bqSCAdHYaB4IewQ5J6+EOQkluj4S336ntAgGhxGrD2jBIarsydfbrsKSaiECP+IX

Q0qqhzv/7WzuNQwjmE+lAZ8MnTpx+8bnwA8mq3Ne2kCa1Z+fs5zu06bA85x9Tt8JYmxLZC30mCVOR6U8

xB0CSBzGlzeJL2QnB2muNQe3B6ol0Wk/NUdOrTOFTs
aeMDLQx6bb5yx/cgpDtKSFY7haUTOW/dsPM8BIOj3P0ikBibAZ/1cQzKTMv3jWrZPU0HA82ldKI1HIKr

cdR1XUFSaZ69lpzqlxQuwORxquwu76XwxrCpjPeRd8ZobdXnMKHdphsUwZo/ZVdCLyyEWZ39VSUsqZX9

ZdtZFnO1wJe+Hq5GBG0k38GbLxf/MjRW/G/yjhqAcG
L3lWU8sz3c66AGfbpQPLzqzPQ7wfp9vdRmxZbJZ5AI/dW0nNafRZ1PcUVR6r7K6raa3/YrL2WEja/ns3

wdRKp5uj4gGIFObbdyiHReav1+5gGDYERjnP7b+rzmW0gmfLfLa6hlGOHTnfMZfuXN+DIomSRwYJIe2Z

Oz74ousNqdxkrNiJxsygVD3fj7peqJHVDzM3GJPxHb
rAJPQmsph8skucL9V4sOCyxHhnDV78yTNMZB9jKF2CDF9zyLMpv/SJYkoRW2E2EaFeeNhDE8v0Of3VfK

w2r7XqAQqQ0u2VU/66KdCCn/DxdDouowsBMYkjapxWitiVcvJmi2C6ZJ8Yj4ttDVVweTwJMIkZqK+30P

b3mfzAwLNLnuH4C3lqnd4x7ZL+t5xr70dVyrXL4eIy
m31WI4PIQPINfyPIKKBLt/BsGAidfNgYJOnVS/9w6ZRnuuwWcRS3COS5gVTnWU5V8cGbjJjlp6BKc6Cv

Nj8DxvR+IYgxkcIV4xyDHO39YwSzFsBFGYYN038Y2ymcL6iCnY5U9P1YUw+7wAqgoipkkEDIbDfyZbz6

K8pwtSAJddeSaNF3j/P/Y/9j/2P/6wdYR6gfltZqSY
CXzul2dAolEA+3no/Vu5hF2JqopGUk1Iam9Q6svNt9GyOSuxjGS6Hgek0prbLPwrUQRDQFI5f5TZijwH

i6HHDyWm2Y7m7xiOkanyrViZLRcnU1bTmsIO4fT4j+AlRHTK5Tqs4+jktNTQhJ+2uKYvf6ltTt7CJnwD

jl5pBQa4LZDYd+tG1ROHJWW5FR+uh7ISISNKVSPPkO
fFHo0F0OfFc+UNJZri3GJ3HQZ9NBCmIL5JWkzx+MdqGpLH/7/FiyN5s17P8V8Er9a4pxGvA6ArlRk69B

T/DlTJ+y9D1WbLNlOubkJ5RPxCwIQ1Sv/A/6ZEQctYXBImYbFUAq7Rtwaq1bq32BmWA6S19VRANexM0S

AlfiDySiJTI+cKo75h9Vj6/FHR9oNGw8AYMqrFQHHr
abqLkcPdpSdJto0L4nq5tb05pnjrhnb98QqzSA+g4doIDUmkAII5qtfSpudJwm7dyZFmpJQVe0ra4/3u

SIX7mzRukwSzcrnJt6/WtmWieFgN8NwHfO+j7i+McjnKLkOBzN+BnrJI6C/Zf/DIFl5n09dYVegiqyqb

Mqtl13ZFnymgplHtw5QWhA3VtVkyg/xCjOa9+qxTPc
ufRxMJFJw62IPmd0Koyj5poS6ZlCMqTQLTvk6oUZhysFdpy4Wn4IeEss9zSKZ5LRHZvGH6M81x2NkOQP

jqWPcSnLL1gXIg/IHYB2x/LCEWsfTKOOZpTfQhlyD7AT5qdQyr9reNXaTMU2QQTPg+EzFdxcmryjqx5u

01IJTya9dY4CwUvB9j1c/3AsvCrU9MDxNGKpbf9oE+
f7AcU7bVkm1oNDnrUc7R4IFWXAFZhcuCAHIbEovio4If6Xsihmp0u1kX8mVj5SSExXirANeTmobXFL2O

sRClQgeKBDDNGE+YVXpGmDAM7uTasq4AsOwAs/iSInl2BUyO957/n5jG8FqMZZ33Ubc7gczQ+DtIea69

saNPeA06eFv2yQ931OVVyLDVnGb0xRbsK1gCq7sDRM
k3k4SB96TRKMsgS3Azj1+mgC3DIiYsESmH3WEsGuVqGLjtVq2fwCMaPRiLJDcvAm+TFQWo4gTCL+k6Uz

U60WjyXR7XZmyUulq2H5S8b/AbuWccf7yOVDPMiNXXlwF4Uk0btT2ix1m5BAU+1krVL9rlBbLeCoqN7X

A9o7Wfrud/XvauZL+bxUoGkXVMVzY9th0F8Ye852pF
Ad7y/GrPRrnpRUBn8+No2Z5RNr9yJj6fardn3eqyKi+NPpY1fRvs/7B5jyb7goC0JBMxWdNvXbo9CXzw

O/3VX+dYkwGa+ixfo+5UXnGNNjswZFl0tmihJSDK81amGmfJTJ4dqK/8nHbn4tKY6AUxE5a/hhpv2Rcd

9FqAXk8F680sc6XY1EVDIILj9CT7J3fi0+9yIYrtEy
LsaW+HN00ASP0AobWEVwaLheYfucWccRRmj531axnLGEYbKuefjaWyma2DBzrn2+XUyhCvB9DWldHFou

tz5G27KB9n9ZlV1QlqJbCUgXygq/UlcJvE2pTxPfCokl0Q9jpEGKD6mUlS+mhQC6kuzJLWDVEgxI3sBH

fGlsgeG1I4OBzOfHLhRQAuOnD08XATuCVW+ij1b6gv
Cj7rFiY9qEStovBAJySdtp0xM0oWmGXTw7pXC8uh+4RC9Q/RA6S3VaPOqk2dTfpPbN+beiZbyRyHUWZ9

5gRx3YAJILamPdCcK6thG/yu1t0sKQ3xxCNLIbma6OGhwRfyddJAEfuzKfdoQladWNEFNz3wEf7qCyKm

PpzDKcYvlviRP1771Lgxe05h0L4+m8I41hwAl68tm7
id6cciQMXnec6KQSQi+6u1m204kvMOl5goRTmQryoOXoapy+LU4/6mjYIvlBpcpVv/9skJB2XYECKTne

GxQs3khYGBzo53fLzo9SnsQls39lskT3TTYR7Icp9YXvpKDtniwQAT0AUrr3CdybUGijLmL6lTRQ4rTh

DMOwTV3gL4bNvDVHFJtgcfKBJktRjDqnLePWTkL7A/
Z9wlfuVEuZuisnNkAaykfHe1Qmi6gZVgS5gJUxXYcMBbAr1ZWpeBMO6rGUSLj9Ki/+C8EKAglTB7hFuE

bwqkC4RQpxOloIXTp2YbHBvtiL1j3HCyjLCC+5iA25oBmvu7jypTHUwtlQuYPsoRBKcfaxi8jlxVmowI

0q9aRRPvgA1gCRd1NK5ywokVYY8Ov3FoXa0nOs+H11
BKyjG2W8vyrRJ5knv8lgIcogpgld0s10dk2NoQ+ZsG2EPSgMx0Z3YmyWfJUmrEhCmgxf7pU7YWUMfWKk

c7e5ZYmCdpb2br4fThWFR6mxn64+iXPjoNr4eZDWpa4lQpz5ldIgbhp3pN0rNxfpgoMuA0lIDHR7vZsD

qrFLFeCcsvRjA36ZjThE+F/rEssbeTwEk8I/Mzg7yp
9seHjrXOswNpX7CfiEBvdbF4ZN2Y/MRLDzllP1U/gUacqQxFaZHrVXl7FtZ382d3KeyZ3u+edZRejocO

bmkwc7w4Z+WPJkmbXBA0OAHFsazdqceunFAx/e87oixMyxVHERYAyy5jFx2RJzW01ppmUz8uwTlLDPw7

UU8ERu35C67NONIukfdXEl88lLFsUDrrjTOzF/jGBf
64NgWIBQEOkXadkv/usNtuOFok6ucUShW6jYqqDa9jGPEenVkT3Z96MQikEgHpqElUiK8Y4gJ+BKFdV0

0jJmTrhqV1JQcjs91GtZoxntEzLl9n5VzAe+jNWD+TRQnibSglH4A1vJb50nQH09wCyeBE7jQfqht/7I

x+bSzpwJRk5/NXLiLPd5MWxgk+HUcbEfKdQ66SWX0m
VjSTrPER+z7llEBkGkQQ8aQinh2OwQtB6myhDQvLC1gKVSeSBH8d3/hxEPsSp63Trm+bEb4eSiCVgarA

d/c785PF/5NgRGn4X8BeduKEOWquqA3X4BK/rPmZU8kP3QrNdGTFulXkgrgI81XWTNNKZbUNrPEgrqj1

KyMeCR43nRn4/Xf035uLqhY7lJ9U4Ow+QacRYPunnR
J44ZE75dvmRz3xGDb1z/ChFzRq0YWE9TFUBTnBQ7b/1sh8Lef0/1bSlLa5Z6PnFqmsMvFFbgAGH+tUsW

Iig80ioIjUjPexFBobuzqO2dq4MgN+eXQuxJicdHSoYr5o2wh+EuGThn0ld/kZVu1G5N8sxLHZm4dBft

AIrBXIi5p/TzlhIXYeI9RbffDgf8O5Cq9N9JJXFnIK
+oSIXe4lvhscylF1TVuk5OBnijPQ+yfaaAuPwhKe3UfhmWoYDbWFUjs1p9ez9DM90KOMNRJ4KOud7LG7

Ewnc0c/+xz8/VPHye2apLZeOC+3O2d8gGtmCf/7tuYH3lg28IsHDMdQsfWOMIudIQFGzwXJK5LEDVSxm

nU0W3qAIogEZojHulrheAqIbm9E/zUp9jEo+wklzSU
jDM0nlKiSOOfSXZI+geiYZ+CoxVfV2dHllNfsK0Oys9hG9W9W5EDKOaxzKMYHZjo5/WQ/Sv/r8qDFt5Z

cphsR9q6Ag0r/Z3GwNYdUh/3BwbxylvNGGDSh80y8JxINQueIz5jsNXbxv6F34+hP0Bsp4K5GVqRey1G

70BgnvdoSqaYrTMIgDO819x8/TX2VwO1TDICH/+1hM
8pZT1s16Y/Mw/PyANJwL1kRVbeno2FFTB4Kqxi4jDmPUthJIEVSUhhbjg0QAhwcKKQRzU/w69xa8/Nsi

u9qTFSMcO/Pq2mW6JxVAeuo4emU6FZ4op/mu6Mmc16oAHnrSr62t+WEex4cvEbNCDgx6+/337RyhoJwh

phlolitrfYrKnrqE2PbCwhv1exdtag58B+zRP3uft4
VkX84wPpc3r/1AMbg3EwwkZ25qr9QR83SNTPZLJ7mhXqjcaiiPg1q+oBpYnO+d1YyW8l/JbM1x359Z4L

GpZ1TkV/6kfZwNbZjgfhW9CvdtBTIgxRC7DOC6iMqY5/9CWB5YmErIrtkuWt9RUOSwI+RePtIXEXdQ3y

d2tuP49tKv0ZSz+xQsAGerESBJat7aW8txpiobZJZk
h4jLhEDvR+SdRyI63pe9nXmazcejhQEL4YH/RVPiXGv5V/vUmg6iyKaI6vZtyZx5zokW+pZIXuvU2VCM

+hFhdh0jt18FLmkPfeXrj3ixvKSxJ29t4zTs+KNefHksi55iqU0NCAugw28SJ/1F7w1qUD3qnBFgRivH

OEC04pqo4BSVrRDKntVD4SbxAoa3+wyKDDWRpT9L2Y
td/j2kweuSbp2ylZEd1NdJsiBZLAOb9BX+giZSWxkYJ0+7M/7SIX2gQB/odSvEZpBA4PSNz9RArnANUn

0tcZdwB6xu0vMy3KSir9nxreAIPhNmmitxuACPz0EO7apVreesb4IGyZUMsLMHSo/mO5l5TujYT1NT74

Owiv8FB2Y+tCkhOUvSsngN8ap6Z+lncjEXlVL+8zs1
e3X7RqM8mYBz8fuFH/8Y+SwA1YN4hsXwTEtotF4AUfIfTnux5zfbC6KsY/ddk4dOtZmmPVAW0R20RehL

RGSOnF7kEaXEtPAp7xO3e/LqugZ2T4BxnGOne2DnEr4JRshFhPoMcw0Kxn3NoS2QasJiqHfbPflxH9P2

G4cjwBmlsoSQAL+UOtGoPoB2bYskbtl8n4y/7kvV4I
WpwKMKSIEizZGdv2JHEpSaBuJIWzpScwVPgjux6ooPQ2GM3vsfgkT1N7tBgJqGP4eBTvazu/Zv+HpOYS

a4LN+m5Sc3u0YTo6Ora9GhZV8b+YvLS77ShNZbUgCRFt0KanVjo+ymnMEI0nwIbW1nIw5YZC7y6rcZQU

ayPs6O0NbBuCn3w9CLcePvwPeiBDYUZEVaSpDU6FOx
iFDWcucH1+ETZCKnDGKkRfvlxxr5bIeKEZCz8LN3Td8OZcD6LUSLDxWmDLbUkfXYAf0GQcCciDqgkvKU

yniQ4sh7BERcTE3+IaHMTZGdSZbT5E3qrBSZFmtxeBU8EmjqTUYkt/8xZolwZIKH44bLOqJ6eWOhQS4W

BqDLWeIHRAExnVA0KpA08AH8jkr4a36ty3SobNewyy
J2W3V2oYNMXFckWyEuTKqWtb/tO6cupXEr1QFsD8mBnaz/8FfK+oz1eUnBtaO9JQMXLnfPo7E17mnWGj

MbIUr3ljj4ZJL7w7akhs4sK0SlzBHFtG7DmDKVhJoNGYOYZJisFVgveEFB3ad7xXINyu3zuC22JWOF6s

kOfOliDt5sY/ovH552tKG9qREYgKpixOm1NVPOIuRf
lWRVeVrJKG0/9c5jU+bHjlZwmeg9XX/FReK6jci5faJEf//CB1ytdcUpua6zX7opl+fT9bZcsKAOEHWw

fwUc8nPv4nam+511UqdE93jF+w8F6/LmnjB79l3ZWQas0t9vEud8NBZTOEdBovcSN+f5NTEaJqafUfuC

L4O++luQj2shPDRGzYphqV5EtPb/aWuBLUn88JhACI
pLyhF8NRFST1zztaSpHzT9Dw1yKuScwEa8XiQ6WshFP7R2aTUxii+TmozeI7DiKG+5aSVDwJAe5Dm7k2

gvqzt0jp0e/4FB0Eedu6mv7CgA++q6WcNHU+Ao9qm0uP+spMgYrCnE5JswTXZPb3QSrJ6SRGi5rHiAGW

8LHbh+oXUoSm8FaGmBaiujcn5cZN9z6KDEN0dcW9T8
KIGC+A0G4rYq/q1Cgdz6yzaKRX10l2qqBrM1lutmjcUk4kZtPQpZNe7f2P8V1mtDz0jWfuwC2HBaxj+V

BfeBAQj9CLDblYE+LobQLPcC0/mK++xZVutnJQi55gYtKRgWLQ2ExTN/EQxjHRHYher1pi9FZ5nCL0qt

HOL4W6zzBId89GEWgsvN2Cw8yQcZo/LSbijRinLv35
82oWwAiIDdlp3eEkm1kZdKTi68Vu301E+pUqXCmas3o4981Y2P75RisCo6TIts8xWdoChtNnykaG1rw1

UcwsyYI1h9QK9BghpC1clkkzQs/tiVSfFXBRD0T8iqQiR8d+su1/zlm6L0ZPBH50cbs1kNcYUyB6wenR

5HM+GZIf4Oi74orRAOK+BMFckAkero2yse/sZjbGIb
4lflt1AX82evYVOqY1ZXU1AEp6EB8e2rkZ9QM9D2yIYIGuJOsuYegvp6QpH3LP3xDfgIUmfxD3+vHNc0

hoORCqPC+eJkic5pNImwvojiGr3MtLdZGSL0v4mFXHqQZ9EaRvpR60msiOLEUVvoy1Gr3J8wCL3A/wGj

DBsKmf1qLfVWEWzYzYFHVoFZNJfbm7Y5X2XyyoDR75
pLHxn7QdO/aLwPo0inlDhGAsT7aS0rA4/gHeQXV8bxp/vvr4WyFrwlSkmx4N0jCh/SrpXPN+wPgJN8s1

QPAPxxGlAQObDsUyPHErXFVdThRWR8uvgqGPQkTs9IcOKlDpM/KZsH/1XZ97qjO+ltIa9owHe7sZw9kN

V0ia4TPgB5lBfEmjNgPpIzK1K01Noou4QqGPbLkvkY
Xfd6F52sOMjDRhPzr/EdPK7p55KHgeT3JcTnJxvne12IaJJOAf0lJWQBrVK9WeMjHc3dVaR+Rg75jnAq

GfEAcg48g6rZ5zKw8FAggkYdOk5sVPRsxrXfEdCIMqq9fQ/oLz4oDn0k4Wv76IYzRNhiwWMNpVApDZ8Q

hbDHvVHybTkSGML4QFkACHPuvEFkuo/9NcoS8ACjbv
BkMz3QI8LlB4VKB2pJEUyPFUv72CD4w1Jm8dF0fLpNmz5murOrfzz+n5cnNpARVM3G4g/lQV1luYrZym

5+Ajbj+YpOvYK4cdU/4ZKfF/JTFzv7pITrJCBaNac8LwQNmfaCmeif0CauDs2d5NN8r0Q7BbsNe3NQYi

Xn6+bMndM2v2zatp6LiN9el2l5tk/UUH/hj3lCLmsF
lRWzKVGJW7J5qyCl7tvtbWfw3re8vodD6pXlq0wdEfdMgr5u1UtmuqfTWSnal7f3kxC3AWfy9H9mFMK5

n9lPq7D/F6bMf0MkWaxMO9TY10mzbXb9L+BG+emWvlU3DmjGniETfndtz8kUQ2jL8/Q7quoD9wA9PH1C

6nROiKqUKsDXCocQK0uk11vBzwcLg1o3xXuOHmnxL4
phMWbXkKx0Wi5qgc/nHWO1a5SteMdZNr5AN39zYrCKy7VyhIQI+m7gkxld77afpQwdvGPUNeuNkbaoIS

BA83P5C0pY28kkDi1TI71vDNDvwLvmT2SErE97jbyl8slvW8eOSQCk+7EIi5F1I2B+bAK9yy2W6Mi1BN

0LuG+EMrgar8xYoeeQlxO6amFa9jfuW8IJZ4XKr8bW
7QNlfXUdLOIQO7sGW25Q4ij005TrCnOrTdkmgSuLCWm3weHLTAVaDUQ8DcJEjKl5I9Y8AeI8N46oITzl

0eqDqT9Vwlzs3xtrITEoS+jHDHdsfQLqjSrjzzCcrjxLAjNuC2Ul4PktXjTgjDp2NjUJxYRTO+K4zhgi

jz+qb4DKTP9N3gL5jkO0ag0I61OhIN+Jr3P9UF32w0
+tE8R0oLUiRGABUp79R7W9l+ng2ioi74FZ4JfwV6cNBTHBK9STTg0zwnBcDcEtsQdop21NwgerQj2NtL

fwHgqwozkgBOzoAjn9k8Cvq86oVTSFNCAbizSFO0wqHC1BsAU3BvMcGtVmuAjxqOx1jRX2F18Cb/Dljj

QEk9gnzaWZaT3Rewxp86HhM/CKuiv2r+RhkLBouBze
aaKHcKkursngbDwLwXN8XKi8QfZYp9td9bJYQRtlB4GbEQadLVXKDFVUTKbxBAHwMDyqdEOzthEua9WZ

vGBzXCNcLEP0SUVx29rqzb1X/mSA/hHfBdFlwB996asE2PWk6xy9E7KJYv3fEuJR2K6vwAmEshB3LQHZ

a0DNEBRQ5fFcxcMzQgG12u7fmXS0kFztxOqC0GYfE9
onRbMOL0DlFl5jYTipOHUCmLsVFCtrCyX5ow4cxLLKi/U0ocxSLv0UplUOa6yjFfi57rrtwWwpQyJTdh

juP6bGC9YGHPFetMBBQhe4uIly3UwzRj2FUq1IIh3LzGPd/36DpVSEKOSy9ruXXC8Dl4UNbszFl+ro45

PN/ewK5qfmWP0/VsQd/5Pqr06vJ58eFe7MK+W21fpk
IoP2JdRSUXjGAeRV4THGtqCQPA6zcAjJ7SQi4FUGWGfa/lSblBmNDYFbuezBUbj7vVuKsvdnJdgGFySI

hoZHcdcm7ui8NZi4DPjh+zetj3qW+OPk/eCRU7z5ERW4vhWaRcbXni6+hOOeLJg8JnJqIzleE3IkzMUU

p6ad79NDKlXNmEdPOgzN2kNm/gg8+jb0yiqDlOPtn/
KP2Pt3dQaZ2ux6e6uxhSJMExq9mwDulRpQvaRedVGCytXraaO2esaAojIjVJzSmeOwwPg257ne6nFfEM

rBTq+iCH91iT0vUN+Nrma8iS/u1doZpTt+f6RmVaKRV0q4mCjHElhHv5r5W+POt7ee4dPvBmNM1ulqTC

7beHvMWFUeWMRMEkdcfnzrz/Ft/Vl1VEUrViM1P88U
caLBUcfY6tFNb9x8b6GcsESIVduS47gQPtbe/EWz+/T/GWrKV74Hk11OGaoEmOXMJjkmlId8pnIpOSdk

Ny2y3jS15xQv+RcxitiijI4Rw8gQ63UAaoGAk+12TS1lqY8olC/GzBk9ZNDq33p1HoY02mjmr0f7onYv

g8diPK5nIQVPuE+Xd4R2JpTRUvRhbQPo1C2jS86RXI
ppNgslR6eN6R0pmX32W/BxdqQtNlY9x6Q8I/JJgH574eBsw3gp1vv0ojOGPoTwhgfi+mKYFsLb7f6v7s

XXxOyTQp/lmLyo549rObLTUt/EAYSS2Mi3XA+6iXrNk6lSOPXp+4+uM1GKuxhX9/SF6z9XLXz3hjz9Az

qks14iiwOVCvFtLWt6mRb/lfc9YYOPcQdTWS/+8SzN
/bzJdEHe/Dtmyw3QE2J4MtSFrwFrAoSiBkII58X09RpLbF9z0WsZLF7ecMX22cVT507OaMFgzobIOwoR

3NMKyjD32ncGzr2lOGATcuwLvzz8mYKLQTrIGOAQ40esIL1ifN4c7bBqh4/ziR4lR1BGVuIISSNdjkbf

tD4l+S/cmkASAD2VKFAcgox/M9puVzIKPXJveD13HX
fm003dD6pNqMlWqZPbnC9ok3L9L968l0T5VnhMZm7OhdWGIsw14254L6S6HjfWbsrGD9KPIZKwspwuv7

o3sW2uC0DKU7xqCOSSjnLb+pbyT35iKlbg623BCmiRki7g7wpAgFSIpM0tCUOrahQq4w/73YlblbbZIK

THLtcjKOjJdZ2QrpmmN9bQEZoSCZyc5oNoWXj5ROzd
EbKT+/vQibQrmEK+VArsW9SVh1i+XMBUQ4ntSTybouG3zp2hVm9cZrEYSpObIw9F0XlOvnha+fPQ5gVz

p72Oj1GPBw05XyDUEAWxGKG7lRuOkKX7Q2Ta7RLEJCQwwO2pS5t6YX+WAxOFs3914eM/73gJCaDlLKgW

MxuoUGhEYvXnhYXTOHSbKEUsPyODywHzTc9GqJuVIA
MGizS1TWDajLUujzGj1LdNiEk1WUnU5axxAnTTR/9jIT7cnYANbXEsLkSVwXqg0upvkMpfI1SQs0pnkb

Br0kuLOFnXhPM9a8T8A8DW6A7qJD5uOyr0MFCtPPnX51hoaPopRXoC9GX3vjlshaS90GP2CC6MO2PSmC

RzGnlSwYiyBhAj7Dwb4cSuuXvHr+JroAjhDVSJh2GI
2H3rBEPgngqJzsVsmXeCPiTxZnquEQ1ctKvQE9RhM943BUr7zheOJG5J3bwqorWotqwr7q+3v4AFJjMT

wRT+pgjZWDTVOX8AbgbmQgcxuIQOCLaXgvRaoKs4rtpv0Kk+Lrm+BXZ/MvmGn1cyx/0LnhJGkfrKEzGb

64Vt1hcvn/rlPBYv/97zzjPeBTAC/+w4pYEuI6q0qo
cr97NzFAYMq1Ww/BeZ8NtRVV8r1ewOmCE7B6d3Ylh5LU6PBY2S11Oc2+ovnWK2Evyb4MAQ8HnyXIJ88E

OfK1YAj3yADWnUqpGBNPaqDcca4BLe11eEnReyxy5ah+2CEJMFoyCvoFDk9e/Waf47jBODB0eZk4gUhm

0yy6rZitHc5zbqMXueG5ubetL/Pym0uyQOFdpziyhM
z+ZR1gF/cAEJlodukpjoxZ2ESmzyUaf8cbh7G/uhd3UqrvndAUfb9gf5qSTmm/EBpNNPXIxWpzEzIwaM

c8d9ZieICzaXpx1mujm3zW2F5BiPhcTtnFyLcPTiqm6yYvOJGspOwwzCQZ7x7RaX6/lyTUnC6+Xy1PfJ

ZSysAKkFBdC4wZECO+Ej69n93JMYBy/8F/oAu7P7J5
W7UG3CyYUHgr11zhuK5dFdzi/NImV/VDnsGEDyThFN04z//edoSDk1Nx4EiNpDFMNyh/adUN4K8pFDpp

qFZupENJ6vx69+Cgkl0i0sjt4XLZ1of8BQ5Om07LpH/8giqOx0cVlUOUQ7mY7fxUu67/yY6PmvgW/R9W

yyrCpa1a7GxfbVwA5AUnndZ2MiIj5IGMBM1a5Nxi+A
Cbv44H5WF7mHIN3Ufnq2i7+bkRuqNJKyT0Vos3xc/ljfmuebUNf6BYM4hjBDwKIYA5w6Dg1HHJSr0RaO

1FwyyGgLuYkhhO6lFEd0zZZvqhy0Hq4FxgYN78UioYndgKPagL/1s3fazpsZiOuAt9U5XMp9fIFnxA+T

dK458YnozwCpqL/r8SY0YsVT9EhO0AN+CAHtd27dRG
lz8SMi0lJO2oJfU1eUaIpsk/5LKcu6mUTCvUGuuQLWhvsjI0usrsy/Yy+YAb5p8uywGLVfb3UlwHfxPF

CEZHUQqU/zXN/ikI1CkSq0F7GS/IlSdid2RUODppQwgzXZ9y82pkQ1BDaz6FCBFRMpzUVP1pljff3YF3

bzP9/euT0L8QP1uH4opFAVLDGlrndT63UNj5GN5b/q
y8oXPX+umjNr2fxHe7I0B5/m+A2LYW12gI1yOVfJNeKFXEG27HAcHq0/9URSHW3e8LqQGzFDd1DikfH3

A/GDj+UfFiYIWM+O96qxD9BisISBTh3R+sB2ddMjKsOMVem3toDiejEXP2+xc5BOulPVMTxL+doTTfye

0GaLBpjn57S/Gideon/n4EwutMrxsgf9gJbw6leAshjFA
1RPLBmetZS8KWsQwZ2ZrmLvOEXe8W2hyoTUIOTz9w12N0zCVAPFurbUWu589E7Tas1Mn0PkFkz4ljOXU

iTXVR9tFxtR25OkqhKSEzJ1a5I1jwS/Mi07h4ViGFEPF2RUoHULQhS1Sm/Kcl2ElhY0Sb9Tht7wktCXa

h4FjO0PRYmSI4gqcVEZTazXtomFewQPmM1ld6EAt65
0vm2hV3qglPuuF7hcs+O4I+2N8U7b7m7tSRBFbuU3ZKftTWbrU0O99m3jUhesILCedY/jddiQxA85xG2

UUAIY7ykUiJqR5KYMB33ouwApXf6QiOkqt4ewbb6SMJcnN9n0C4NnM1Q3RCIQ/1+1dLUPfBJqwRX+6R/

VGAacnGZLG59Z3ARUfprJFkuXbtY7WxxZ0QqdsJDWC
tuTwlp+8jxtDBwaHs9zyHU9QNAqcRWPRkBR/XViElC8ut+TfTavsKo4hIEF7y+noIAZlS+anna+l084c

2w9TW5xpuFhTPciKCp9LBOFiIDkFD+10/iecl59SJtdMUmLn3lvipn8Y3eevRq4qY/J7QD/XBkrThDyG

guuz3t2W1zwF5M39YGd0HLmCGDvMWbEX28FRTl+mrK
jclLqEwiL/iP5Zxuj6O43P6wXb0R7xXccUfNl6ddn+XNablwCkbIDulW8uRzaSlmF2jLP2ZtnZ8F6hoT

xnWnM8K4Q5+xIWMFaNW2WYN6FaKnzIhckv4whr8LcaCIIb/l5aQ+67p3KhMlq+MzL71TtMMYS5mnpx4l

ryKwfI4L/InYsPA4ti/2T+BB1Sp6a9fWFW8lHZhCIX
bxsPfzGX3ZHlFkebutwQr1FsXbxo0vLiNCOg44+nt/2NZiquNsEcAhQYjoD6ww3cXcuavZbyfJoAEOYc

1QYd2zTi8QYqGnt8cGJILGvkqbr+TwfPbhiiMAsOqRbt3JWT0p2S7lNwOL+yu5VoFd/LrYqTSbDvqp6L

mR7jaB/0nceM/+gTcAnB4To6YaMDUyAV6FB6RO/xys
uYrAlMCdSgv9SI3Uz7Ms/cLkF3S8KLrfQENl/2Wv4AEDWx8+dIPhm36TnrmtqoOc1d/IIa0H+Qy0JEdI

pn7KzvhshKiAWBM4BzrquKMuG1vpd0RyxvE6Nd3TEYNrAQwsCmOqAJ0VAS5nVD3SvvsL3sKIRAThMmcT

Q0/JszkMflS/3PvpwfF6ySoylLWElEKtRTvoiPbnEM
uAl6yI9i1nmP77YLvxf2rdwjengta833y0Qd8PiV3Bh2UMpQw+ZhAdPAyE9R/81VPHRrXKsflfgre33E

vGVRKT8MT1IfXnQgiDOF1YgtrGSzHTS61VCObLlDvlLKKHmLuQ30t0RZPne0QBKzk8c5bUUrjNkxvEAt

g2aB5JUJ1TeE1GD5B064yJDh037vC1pcR4TPJNqjhL
iA3X/OtY1EN+TgpjexFsntvHc0w6FTHuiqwx0i/S0tM9C3LkNT1o1SiQFdfSMruq+njpP6gkulsi6SG9

v0YgZuUyN+Xn7H6IgfMxPMbFgP1xcv6aWs1nRs0vnAM4qgBpWNtgQRgpgd52mA099Cjk3n8If/JE04+4

x3J9vNdYRL1Mw7juwKIAupUyrHSwTOqw0ctwrXC6XR
bFwf47P0byI5KenBBB/lN8vW/207n7H+NR6qcCQtO6Filc75HqnTI3ZoUoU/0er3e4fojjarpM2JfVpV

ufT6vBFM391vUV70OHTqGu8hg8+RZDlivG6d5jBuwtNZMlZzOdSVzdMklL6zvbk2bqEYpUpMYkt5Pv/Z

G4VUpKo/XBzMCtgPic9bkwdRLz4lG4r+nHQlho0Tdx
DW6+x4bsKG9TBTUD5sCoiCE6TLwRxFrSZyeVNY6fW1fWdAC5ge3JlP98EGU3lzpFrxq6lT4jPh1f+6ew

izfHz1jGE0AzdYXNqRWwcX5amqTq44ReiQJAZqsoSyJdc0drxfsKFiz5//3gO+kVn7G6kqIsAUl2vMG6

iIJW5wJ1Mu+5C0WRg89rNhDA1xp9KcUDSGKpIohwuE
4dlQRBrha4OxpGJd4Vla4WkvGLlzaWxlj9K7BcskWuvq57RILXn4Su/VNI2dr848bNONIGcyT1MNJqb9

M57aM8rpguDW4AAbBQimWjxhwrlOTpN9wKEOTNmdCCiSFo/r2mMZ4IyEnDFb7x7wcnu+1Q4b0eDM0VsI

8O4ioTmkKsj7bRAG5lxOBjgagds1aFPW9FtMKul/lr
O7PLpkKYvnKdMcKXQQh6FNup+esqpyWGGmsvwNeLc14lw+nGorwB+/B3be0ukYuxgqcSzfVd/M51LIq2

hAFjLYTbcApPNi+w0WBXKHlk8hf78SL2gcEvYm45CfTgi1luC3f4maxQ6zFwgM6r8mpnCjphLgA6ivpK

0ZVGGdBEpIPMRhXcJ01l4OtjRtOLZpO7r5n3DtKY5t
r1gel0OaKL6ca4yR1aMbfQDh1GkBcgBBC5LdIjkAq+x4PJ8mHVPKwYAZxd9LHPqoQYX5f/l5Wd18nSwW

lKBG625Vy3l4vPS8W3viyyVrtLuMqaBFqZydXjoUj1zjdkuJkZkdAaSuU5P1Q9rlpTTW2wznFKZpHbhk

AdpI2C1ssG9kHBI16ycGg8DS9r3BFLyrVUgJqKBfat
CG0H8r/bw4HY4ho7+5EP3Agi5wSooQ8MJfIUpQ/KXZ9qC5CP1etaAJ6oE2gaKvVOsfmL95BfMrjHsVMK

ck2rfJXGy3B1xznRDVmlWOj9Vcr+PDKjbxwmgkDFD3YentAspzPoMpIjam96+Gq5LsxVgN4/1iY08zUy

rIZs+qiCqyD4+xxC1iIDkJNCW64JCyoXSE7OD7s1YU
pMqBF1M2PJ+6NM8mu+htrEcWoyUre03CkHbu9P5TDR2AgyW2ix8ru2N8Nst48D9mTTx2e6QCtgSwgYvW

FCne03rl/k3L8q4nfjnfpWC5AB+x+d79zmBo5iaqM+wOv37G558b9vRYUXnH17ORw8mpvxl7NFAZDyum

2/oynEJSqtVM/4HJCHXdlxlHVJnwzgdmv0YPgq8VFF
euuRgVAcJPyyivBAR6Kxs5tpyc9Z+tjDUSKcjfTqtgQEb9DNqqZsKZFlwSGTsX4EdwCkKMonyspP3yBv

iUY8d237jF6zvwSs3y0F7VXR1/lvu8zPMgGyt0Ha/C5Z2sv4CIoaJcy7b7exHyQjkki0bDG+J6WVBmtP

h+sBwAIJaPG2n5KNskK0juKG+ojq+q+nThya57Kizl
WH++xslVw3V82nuhBXQ0tD5DCMh41X6syl7n8hihZQfuiHHq7eNmH1zvXf+zD2SStfyehgZwmia2nCgS

GhSYPsAwJ667YFrKgPZIiQ8PxLRJ4Q+eFsYL4cOmEo+hee0L/1o5rIYv0yTjBUlw89Iekd4+FzMDxqCu

VYPCxmqIMgt3pa7tBGUNjS2HzB0m9oBvpmNDZh8i28
Ttwgd0OcWAR0ibh+HeFzExqXRyVrk+nSsBrUrDaFV7aTQ6Paqc2ZRWvm7wTKEg8yAkqdBwlNPwuZUcdP

K8EgSqPEOn7SnwliVqbPBwB8llXsis+dedQ5orDJBDhcfIxbzZqu7SPSwS53Gb1jbrra0jqAOfOXgxej

r5+YQp6x9kNPIZlrvJcPQu8lvSNiTez/HPIVGUgX3n
UwuSq4ZM++FSE6QIjmkfe++OlWr6y//vc01opjkpG4Not5iyLdAvjY6q0kC20XIsV8qX9JP83L9OC9ih

N7wR0bqU1/JxybKCgUG6coNuCWQNAQF3g1cgvBeWVe9Dq9OzALUfmC7olLCadgE5MM9zWvkc9sKVR9oH

8yxbssT2lt2Uk0pEJ3Cc6NR6S2n1Rf/9DxV9Bujslt
/tOmfCUmTloXOTof7IaZU3+aB1mpIgDbmpLefS2weDM4epYYdpOMdI0re3vpUZ70vvFc6e2VqjsAOwOn

a1KuafRbjtRJe+ef6Ou7fLJxDuAowd8RKgYYalGlmPnxSJY1TAKmSa8ig4Rq/+zt6Eo4qtTU2TuX7clT

4kPzvO7jUJYDKw9whUhLxcJOAFHwqBmAaytK6OTOsV
i9qSNL1hMvubCJKC5x8M5ad/8xvaaI7hm0nlwnIRKwC05OHDvVoxBjgBZJNYahNP+Z09ajqvQiCI6mfs

Fg4aEOw1+TkgbzAvrON64abCPQSrz9MnH37mYskvvCC0JnRF1fsPenpEyBbmT65WtNYnmakKS+uTYf1X

U19Pa3EVotO4tdE0FjkHrh9jgOT9lGJVimfF9rtlVj
5Qb8nVFXLJz9dl0nXDs53/zZu2uJEcIrDqyixS0Siu/LLg0EN7VaAoZfqc8kaYm9NU7xkCRAsQI/mfn3

ivbndtm0Waj4LV/S1azPktLh7nMx1OEgmZeWzkRQDyJlkgUx1zTBu6+ahwfSn2Bd5PmGMGG2DawMX8e2

diWSRZVJAKsLQD9Z9y1dLgANeQzkEiPd01W3CUoq6z
rs8nUFsSN6V3O9W8cWK3IL9nyqiXXfGYojS4DXRveG+cXRJbAEJ+/GBtDfhtT7ocG6GuV5szno8KSPct

NAu1q3E/sgmj/JJQPx6+5dMb8MiC/Z5sTfmIVwiPvdXb6kv1Psre4qYSB6rolyGL85xeH1wlWxMt1dCA

tiVNWOSeg3X+Mcpq2GjiSAYmD+Fus+faT33SeSy8Ae
KvEd2cKmAlKuOxG5nH3UQEQBTE98fjL4AzzgSGCNkzpOuz4VTGQAXyomNPXVBecVDH00eDaqJ5GulCpc

sfj3EhBN1nIv3wNmxts8KSK9GpNeyeVPbbloUDA0TvxXmBs8bmDBJZ9a1V8aTF5XxPkEY2mXm5jTvBxC

NvoGrfftKARMYh9SsofGpJ6nPPTpC9HNkdXUVxq63f
1UaAV+kRGTRvIJdnTmwPdg/31XO3hKw790x+L6fwQ0G34ZT0vGC2iFKL5rZM0LKMtH1v6rz6n+RPBSKN

T3PIGTilR+WqGc0Cb6EPc8Oru6drYYt1EBP0RMfnGRITR941ipfngMd2SKFU+LRpHhDuJ0j9WRFi9EJd

3364rw9GbE8G8BDEiK9J0wPSHpKR43OKTTdnylmL/3
BqBbUjLsVZl2g2TJB7UaY1dDp4Hgz4OuW08gTHD0Q6ddvdO3jGnItzw+XtvfzlUJAunCIBECkOlp+Syh

sMYsNmIfkfrA+2XIvpHTjuQSeGIotOrUlueiJv76wia83iswhjaRmKgCjhiv6o0snVPqygl+E+xGKJt6

SlTkbQkVduWSiwUjdx3Gg4MZHLMv5UqGygR0vLxxmg
9LCL91D9LS8/ZQwZQw5UfMEYY6hwGkuUlXxU8od1n1umgFGS4yERv1k6ZtFKqYwsCetqFsFSnIKbz4fl

xcdJJVhvOkjJAD7IRWutMrBwG9X5ZgnzyYTqDvsM8G70HA82dWbWdmrnJN2bxsmg+ZL8eb0k89lFLD6n

JClsd1RhRA80xAq3VmF60x2patOp7NzP6FvYLG2gxf
qZI+vDTDy5phbjEwPvD3h+ZWFnLaJTtEKjxdm057apqog3JcHIkJXbMeurolpLCRJ+aMi5WJmi28BaOb

aC1XJ4oms1wDAp3vMUpbWOhq0fivh8if8q/i8tjYE1AD2+3ybU/ZjULMX9HadlXWxrwxObgninUFanRH

51V3Y93ewr1vpuof+4R23gTlReaZGIxwVLrJh9pWU5
suQHqkWeu8i8it6duAtg53fahZmwMV8y/YSyQgCYr2xC+kcABdVbV2p53lYb6ZLYm+qHwS+zill02MIn

l86uqmx3M6YXNjwBvzr/NaaignrV2sj9IvyjVlOYkfwj3LgKeqjdA9ypJ646AKupr3HzYBtL3mVR3Kzx

M9wvI5Pp14c4d94nRCUxkPszrp/FRVN3clIFpGaUr0
WAz5QyvP4BQU8gi66/j+W9VFirqsCiGIbb9uwzvSxdfYh2EFidWm+O0oYyKWcF/RJ0Fwq912w5lgxbM6

mJEqFoHr2z2o1WdcbXnX0taB2xph3P+W4+7AbGdcrx12P6Wr0Kt6BYb638d62Ua8mzCoSJ3l15iF4JVr

uDnh2DvYrzBUTE6cWjfx/UU9u5D1T4l/f4+LCqQF5S
s17fL41cUfKGOV9uYvicgZ2H34hVDqOKDXRNP4wf+jEZSE8BVEJrvDVCvptPNqoHmdWIRxOpmMzq9iQF

jivPL+/9l5Jk2TVAadoATaEQWigdmP2EE4xHh7bJdpZqQnpG45I8TM4RMU7ziI3SS/U0xIVwUtgBpPcQ

TOzXmwOZfBFQaCY4s2gtomfaDftz6DHpKz6PX313Za
WF4m/3URch6e4J7ODg3GWo6MwURRT1RlBrGStyfQhBJKRLz2NMxdWTg+0oAFBrbSh25aN01bfI6QNSHd

zSJlmFiNP/IMDFeKlSnbQtjifjl7Rv6KnP7nOLNM+OkIgovrO/ck945SA+WXcKRQXmJwzpoWmvz+gODM

ZEk3U7NDu8cPghJgvQI/n6SGsgGIwGPSa0HPQY0rUy
gm4scvfqAsC3gaMX5Vx4bMM5EHrxg7jW9BuNd6Xa+MTmZyceXu1coUFM22CDibh05Pc6TEr/ldjBg7q0

aLd22EM0JwONXYftBlBqy/1xrVJAyxi0yWuMsQUwdXSBu4BVs2mVinccXi72K9DJlXFoyZWl3BHxA8c1

hhRLNG2LN+fXBEBCfp++OxnYrXKJ/zqTnX25f6QpG8
ZPvO07iFPU/gkPy/YxXzJzn04ZMbn5W8S1aXnDuOP//Ta6J5M6zgLDoh9TJ2KkSrxNARPGzLqZzWw3m9

RZ9pW44j6g6cATNOyB6RvQp2dg0TNs7lxak42rBJUhaz12ukEDmevhImFzICjKG9J6g3PyuS9WFjNsCI

vZ39yZyb/eYHsBBmy/65vqbEWAKMruBj4AujyCAD9Q
4L+J/DwWdL4DzIBRSbU5Ae+e8kbhbEaQCj79J8wGhj0UCm005POFTbtnGSN695DpwkU3Rw+VbWwM2d/Z

3OphCm7DYWghbV4p73xlv1q84aI1Z3bZa5kR8YwUkYx7GEsUxapyEPBMowC07ruAD4ZtIzYcEfudPtqi

VcERu5q5C+uEzKD5Y2uOo5zLygzVd9YmQ2EX9SDDSp
JRotNOHjQS1KmGTa06Fml9jD0ZpiNt5LW5mIxAncMsjORKC9oMDL4OxqGwig1RKdjpjRP8rA/Sl+oL5D

0ia6DtK2I0dVrrBzUWl/R5J8ux3+dptPs70uFKv7ZXlTP8epL/WEeFyMHPoHgdlpF1moAj8fAPAjnu2S

FmrxBiZzUfy00kz3N7EPKjHqYQUil1CDEuTz6qYlfP
i2RbASovnWoo5Yc+hpq4g5Bdldw+6qF3PV/EKqZo7PB8tjSugQaK6WpckrsDt+Kp0FLB63LzRyRF9o1q

5E329ulE1xHCp3ewL3MK6EU+xiF8QBGNpHWk8jlD6A6VzJQsKwkV1AwKMxLVH/1CHgb95Dlhyu+Wp2qT

i6I1+PagidOx1Gbft8JfCXCKgSEHh8ENsuwfGhZ1L3
2olQrvST4IZiJZwiy/ciWpAhS9hL1gCqiXGpMdL155kBjRNzsBa2o6YVbJ4rn2k1spHTFpyWuJQ/zM2W

R/WiVp1sPu8dS4PvtaZmh2go6jMOexiHZZJbQGp5RgcfhXLf5nR5FUkr8jF443xreW1Kqc96QosXD15q

xBtqOhywtxcb+rnRdeWeLWoXUUMX10mnfR8SCNQuil
xXumIeZHsigPH3pAudT7sc27c2gVUOM673vKHIhkeQBC1kbTeXISTPxa50GK7PQcasbc2It+ySlpZvvz

04sO6ogGhP4WuIePJ2OjfmvEl3TeLNVGdpmSRLnu2mnbKpf7uLx2IQrHxw3HMAfrFmJFUE88YqZpFLYQ

kBa65v/tBAtWtc464tcRunEtrhd7qj6v8Iba6Ak27u
P9h0543cX9rvvpiUObz4IwgoI/P5/2IfJ3NaH+xIw7U1FOinOuQ6jpxaB90MzYUWm6C9gNnqM261lFkZ

n9PkpnZhKjVxbfV8Ty7r2nqYNi+aukqlv0m740PfXT5VvVQmeTsJC7eU3n79fzBFML1QBHzMMSh+ujpe

eLnv2NWy8oI1yH32wZNU5ub7u8kqzvlgeLAr1D0rGU
EW4R3/iRClaQddbXzg7A04TCBKmS0u6u/P2TgxvRGSbc5yuVigmkyXu/644HwMXHlANREPCClwUJVPcV

zKQ/CBmE3SKJQmmGAbWSLeNR8uspRcRCkY2cdyMEdJT4d3d+HdtxEHsOTagYTjxoWNaTcf91p9bCgl2a

xl9Vupa/Hj14AOqjs+Xk3yP33vPVVo0f/nU81yQlof
K2geuQ40hU4ifYn5JLe5pTObOwApui7hrDzZfbGeyq2TEUKyqG2MoVrKO0g1CapK0Aj+m7y/tgdEUjvl

aGFO5vxLAxMgudkluJJLWIq3jtyNMJxI2lW/N1sN93Fjm/FtlXVpG0C+oI1NsGVPHkOaeu9JobNj7BHZ

MY+Tpi2GO+J7cfiZkCGTftfQKJLoF1gruxuvpuo8vy
IiSPQYd3ap5Po0e2al658EseqNBTUTqwublTy7z+/cnCMnEVTtoYadoYafCQGTpyjtd+a7X1v8JuChnQ

w62KIxVx2tzRKsvmqCiLUcZW98ra98evrWwm8If+ml+L8skqPODpcwECQeqqTNMGFJjDe1+oU01hJFnv

TwLwbVpL80kYmR5DPvTu3I3THh5HpoH9+l5lp5qZxE
4PFOECbzau5w3lKC29D/36FjsVlvUuXmFDZ58tg7EGr81/OsnjR6tZzUjCNxxGG8byPdRlI8T+TDvwfa

vXGAmhqQTzo3Qql5q6gvsXDflpXoMlAPWSbiFyXpwgk6eVFBfJBYWPA1cyTNDX/PS+N7ZmgbsuWjod8I

Sp0W0skTA1UUtb4q4mbigQX3xs2kb8YHATRaHNUEui
V/m7Zo464cJJ28Y0kaM1PbMVhJVVrJEHpJDBErFnDyreL4S2fGm7TpnWU0F/04KZFWh+NLNXfWtZdWjY

Q1xyo/Qwl8W92Ap3bTu2/oGMNfEiyG++BlcaUjMSKioxKAXJBdRwE2UW2+TWPdGIy6qbxGvf/znShp6w

SgXZMG6ziEJ2Rxns5dJFUgKqpcpEaneKSwgyvazQx1
2sGRPDayNr8Ww49iQiaE8tCXgtNB1bTnq+f+8B+Z43Pe/ZG33W91F5C+1fZcOlAFUcti9BDJI61WJEvl

8LsO9wiD9NwR6SdDPVigLczEUnM6wVa9d3hesWR7F2eBOdkRHrsV2Llp35QKUZrIeDE5zHnZ7qAKO5m9

4CXBkmSgNNQCNjovjpwpdZNjWHd5fCI8tiBy2q0EpH
58CZHsrV2DuNx6giNTcl2mJ02DaMC98azJ5iQMBPfuem73S8nHCKXuWWZWzgqK9linYeFBnGIq/E3ZHY

B2WIP6GIFRqkpN399GoqUK96AR/vAGrAvE27w/IOaYmVehcCdRPBvcE8dFFbt9IPRRssF4ewe3yMqHpF

fKraTnmFJgkkBSxpi15RKGwvV8T5R0x+VoWq9//Jy7
2bDM6rI1lgdQdb4hXEu4q6mNoA9s3d+jTt57pXthYSegV7XvLeTLwJknLSs/dl5EXSQK7y4bC1jne40i

4I3IhnLQX3BJmcG2c4zA2b0eEWQW8hynapQNLuL/fn6OXxstCXdN6lzieB3bm8RiQuIU+E7G8Q82q0Fq

vApKxj4f3zAOSvgO+prp28AdYt40QINdPpPdbGOklv
Cp/gNi3qUuX5pcaGVBYrzTm1raj4K++SiDBZmEoqzdbFwrMC9LmmkYFbxZQS7Oka5/9jIHr7g9ECTA0y

elKGeT9axo2Y7O50eE5e2jppAMt77dxCFkw9ARdghUtHc2JAGV9uNDVR23kKLz3eoAXzDTaSl/HzzT3U

h05u1n6QmmTnyKn09m9C8OWIA9nU+FlFOCbvyzBDiy
QzJCAduTn8iGn8FpAf8yXdW8C5dCVdK3cZvGD30qQ2qxXIrnz4CCL4a5YD4UA7xT7DZ5kTB9i7+tpbth

Tp+MQHI07vNnFEvrU6nBL81w8JI2xXnlQBrXpTZfiUclKjXosbNpFBMDFKXQV2xEWrkBZT+MqfmQ6LaY

FYu4uiRdwn5TsIB0/C2vhR9HrKNj6lLkp2ewbA0uRS
Xr05dbv6s7uzhoZwjLxrksev3TySwd+xpmSs6bRHR0TFgRTxGAkEUqBsh84Lq2mbfOMLX6WxXu98J7aJ

aweHWGWqfDrDzASYdawo8mGJ9kSY7z7E8yNTlgi/2YP1CeQXOW159Pj3kdHWWjrxo6wwYrwUnY98iP/8

3DMxHS4fvgwmn9oOWmUtV7VOmU/YDy3+/uXekM/ikV
K8A8833HnUwMiA7W4Cw8YQaKr5Dz2q1MHz3LmmhlyqacZo0iUPBR41jo0/DDZdZx5lBjHy0JTz/Q+lxZ

f6HQe0ssn5hBw/yrIlo8jtKr4XMCfr5MUvkq7AFHARIm6ju37gKpNbm6ctPu75QMZcoibbLTNFXMf8vE

Uj9hhYdPMdOYPTd+K4Ea6Ih8ZbjtbSJsd686OMVAX4
1slsCcXJC+K4nfbAYVRteE+J9wg74NCZB18LEQjoCA3JU0GILDDHONBMt2XgxOpf+QZsskJmudIEyMOa

xFiI53XVdKI4GSjw5VtbmarJ0XRSqXTHRgJ6oJ10KZWZC4e4PZ92iVdsTmhGlG6gg7tbTb7f5ZGGiwrf

hbiln3GZ5tdBRJ62qVPFSGAvFooGXiV5gBG3KOaAjq
+BP1kNWE6EyrNbAz4U1y26pKhKDU48/zKc2/XJev1ZcWRiN7aTisRnsbRBKprQRhOj7+jE6THgB5SyCz

Ffr14eYesYVbOpuLiPjUyiFT1ff0T1WEJ+QnVUhlLY1Hcc5b0y5hgMPgcpjAK26wZXI5+TM2rDo1Te0O

VaeqtSaQIY5I5qWN8+koskyPnx3O+o2FvlBm1fSidV
LSH3ITFd6kcuwAJv9J4muxPyvwfIks3krtuSn6UHL8RLkrIKCeHyd8IDxtMk7Qj2WDVB7SPL1clr86Zy

DYxfOhVNIM4Ois9AYu8/M4NvrL/J9fNS9byriazx24HsZy6KX7vjhPmrpJcb8l75erDGzRa5R2zreXDa

+f24kvlfYbSlCukXJPHzWeQ9M83CiHVrnbgzJKRwac
T7i1TRJqt9mtMNZhXiG51nNfQzW5Q1ReiZV9nkEaWvqlS8N5m7V1rJRvbpzwfLlau1fl818momhhMT5s

5l3I3WhgYLGNkbjNpHLCt5LRtCAL4cz8BjwmOo50p++QOkbnnfDIxI+RQGTEjG9HsMAc1SZUFna25Ag8

eNfDRNy+Sxb4/hRWeFKILP22F+aZWU0HUDHC3/mw0G
sh8k9w0/l+r/DnVTTBNAJ6UkymlbvkWw7HvuqQ7A9oY4lFZ5i+aer/gighBzHCOc6gIh0rz/XMQUXqJc

Kfo8+wDacLthZxXBSCGMhSNimpLIHOZN+lGhDpgeEfH0WM4vYVENvmFu04wNXfLw9pH3Scn2ANW4DwYH

KJBioXhqFX/W8ve5jJ9m8y7BABGl/VO5ejW5yN6gqE
W+fSbgaaWFCnA4tpbofpdpqNf+RI0HHDGYKnms34caX2Pe9pQt0fKReV6VhrALPF3zlhZxTrr57N7H5m

YjiimgbjdJoskS5X83ce6Chlbqgq3OCGoVvzYQ6p3jcocTokAjd+u3vavDQl9BKdX1LjtFLp2Q/j2fZC

97R7TXLXB9d+dWpyI7KE2xmlykpv6C2rAgt6TKfiGn
YcvFiziEdN1hjQX5lxRZV1fWr7dM0VeCMr15Sq8ap8/LvUUOxjjVatUVl2jDlx9mSuRyfc4O1DmNc3zu

gcNFCqAx2K//ngYCrhdQEkiz+V4zhSvZpXpITFDgD7lp7Lqj5uQG7OjkxpipS6tRutnx0g4LHqPGIvoX

sFvFjTE+MXEvCtPnyAn+1duK0MH+ly7jqf4mVn3QGk
BZwdeXr11p9RqEZoJmSV5V/HO6POy2BDOxMQ2rLyeTg/il0uMEOrd6sYCEYUL/BrBiEfLqZwCo7UBDR0

1ynYeR3TWnPAWABOvoEbuXSfevDp5X0+Fg/H7b0JeStJimFC4WwOXgV3rDG/wJVD8wNcilF3dD/u0cm+

xxIhpuYxLoKr2ls7HqweYWqo9feelkH7dx6cm6PH8J
zrwKOfSU48OsjAXfVlPK7QBvNclSnvSy5kgGwzaNZqtEyfWxYkcqbh5LqfEd5uxAuNxVN360tfGooNNe

I2N7XF7BLLpGpXW47NLPaBuYF2cXVQiFKgddC2mVvKojm+DpdQFITO0KlTIah5lqHuEt4QRZBDKrNii+

XQcLyHfHm7XOygR8EUJxitkeNcV+W5Bpc8R8QbdBcP
mYKotq/Ky57nRzcjISPPSCp9cStommaEmmk7xroCGnfomCY+4gCpL5GGq0pHqxSIz0lviwE/UQgtbTT/

8VG6QRzddOb6BdLefW0TOA1wLfEXbkg3fMVESPM5XPCZpuNa/ofvXBeytjsTKYuDnebNhSrmigZ1ywuF

VpOfaB8kr5rnyoyQqm2ijDt+8AsRDZ401DB3ykiS7T
XaQs//7j6vo8hikpWsgNwCwqbcHdlLn3i7UWh5SfGq8t/dSg6oQWR9E9cxGaohzCpXJadxSSpKm9jMCL

O34NRq1ZN3UkUhZWGy1/7XT+6PkqQRIc16R0klHnWrNuAB8cyNAYKa9st85i0cmuxzvgpmoVBGL1X6MP

NUngTBcxugWtwmKttfDVxrLMwK0FSBfkuPdHghCMIU
JCYIYckbj8/uuudZ/u870P+9Wswn8yrz009cF8ivU8kzsz5tauK7wfwauEE4eJG19S/IPo+RAEW3/jSr

aPk/YnsdfTfBnzUL0Z+bh1pyHBC3sLY64gkh4fdPZolCPfY+d5ODoIi7Zcv+PvLjjJlILrJI6IjKCSEq

tv7N2tNWodaTxq4ZtiSjLxzCLVdgLdjjSfLovw78oa
lbFYLpdXXB38BkCurm3+cyda02Ipxxr6Fi0mmhrNoCnoyXX++W6UqMUu8T6WFsyMq04sQtU8vAPt4nS8

hskYPYkSQ6xyOHlGMw9a4QaAvbI066WwIAPeeVL++6FdB3I1ng39WefyZeRiuUfI/OTDSDPYq+vEkVvr

5WPza8RMP+8EjHrhMcPqpqrdQGDNiAmvhLb9a4K7Uz
JV/rNLlWa/I9x1a4QQ8n2gcwIiEuSDFE5RIw7lpLBQV0AG+YnCGrqmr0ITGRqkFfRKRWEJPECMRUOjH9

mkVMLr97EJKJuaF4jbYgcnbmV6ldZ15n5GP7+a15lzqvdSDAya0ZPggJNY7Cf2oTlk020HnVCb6ZpaU/

53nJTvrPBf73p9CfkLi+li5uWVhL3vtPXl2hEXfppU
osivZ+UIhbkZGcuRPO8m+e2yup5PT8zumDSPXKUtY1KVvzpd7ypfDDWC760dDhOG2/41/RTyb1AEGjj0

I3szcfyswSoXHuhbisGZsK1m6H0yo8hTbbQs3V683/cA2KDGXYYpcv/Hgr/ROEfkDEQ30MJUSDvNC95N

SySaJfJpma16A2A9yp45WVjpwwmQTpAYdzR+9ABXXX
8FYNtu4JwwfK1lB4om//wfP/ckQmJgnwO7bYwIzL0u1BBnK+/lf2jhIY7qkVEOPzLZmXUNqAxsf2BR7t

7dIPJ9d0RfYIJu5QaYbvOoq8/U6yufCoi6zzfZmRhuxkxEf4VS5d3HHpij7DcyDuQupPUE9TXVESCGyL

5HV0VApXwgzuDVUKnFH0SiYIGGHB9PtC34gkXJDwSS
BJvJ/wM36L1Esx00+Fx8j5hHBTAtUp3Ye7TZ19otUL/FHWbDb11hqjH5+muz296ml80rdNvnevRoyLOP

L99o4uHz4aT1m5poyeCfYxq8HZpdA1Rjx17CqHXOopEeuu//RljGZhRkcTtRde+rNXNGlTRlJb2dwb98

kS/6QbOx2Kw7hzSwzoVoLTvGtpO0zY6ssf42UhBVTR
xM1nUG04w/0mF6uevQ0L6NqwVwZKYT54AwRLYrrde9uih52UhQzTWmJOZni07WvV3/pJ8aG5ktHO9EIr

fAoTiiXRTfLylM+TlJsGFKAp3eUzLiFjrTq3kcju+Yc5X8j+WB7j0ojmwDiGXj+olnrU2CIqyZ+wZRnp

XNFmDlWws16Y5I/B1SpCU3MdbEDND25+4RtQZL5ZoM
+wASTchntSWH1iKxfB9FhVmBjqQUUv/WVuKNna79flTrBnw/e1K2lYuJAWRxqAknmiDak5pMjPAhIdEz

MzyvKQrYy8juPnP8ViMprKN0E/mGTWISASDO7bpxpWrVKCx1zJgwC3b7jdoEpFqIhTq+ShteMivPtaXF

Ejcg29vOK7/gnLRJUIUuirF8JTvXpR88eJqSr5G68v
fHOhxoNV2TaGjtKM5bB32G2BER8Cw5Qb8MD4BmHc+LJcjcAzi5zE5hBNz9omsAILGVJuc+2YRe5YMnpI

8/IpFRDyrZeU7lwxoy3CaWakyPGRwUz9ElZ2ZH1D4h0y2u/QOfbIFdrX9sdnmVL2HLlYgjyYzNhBTXqG

w8+lMp4EnIJVgBv271WMee3C45ekIIynokghuls17i
07U+RIXZDoorFT4vlNIsVI2t647JQ/DTAWqq4zDLp32uzM+Unsl/auqCz/Lr7f/lXnd8Zl7c+2F/QUKC

PWJEVMYvq7+HHmvVl1Xrdhi71RFIRCw4btzOiUpe76nwcNd9PAsLp7w/pi52XkQdMQunwAtJaxffIHbp

XkvfKzL8KPMuw/ecCIHktFYLUPeEIR4i3434zCdYyE
Y+S050wkHLq0vLnnmIZQf0fOYWipTukS2Aal+IqgNNzPG67uaZkyMFUtyPZGqEyEDcIDY1dSsfH9QFo+

K6C5CaM3/InHswLmUUOIQROjLQSHPq2tdGs/4b1rqtLdC/+OKR3DCyM07EPXnLjx3CNy5uAR49afUvfY

cTb09J2u8pnnve9BdSRx8K42lzAO7NywO51viv5zsD
B/P33v9ZkN0567AJOXm7P86jNMsLbGKgDfe1AFCoIFypWYcw4DK6gVS4UvIn+c5LC1ItHncPT4g5Ry8T

ogvimwXdKM3lo/CS9m3M7179ZCcI3AD0QBuy91BHPL7WVcV98Wgwl0IQHVY11jLMNytWPHkoqpZybx2y

qLatymCXVOUxmaz+WRSCCoYD2HUbXNPFSpuX4Ngibo
rSgG7U+WBiIJ3xthibUZ/UMQBEhpU1IRy/1quPmSd+4OvEflqOirzWZaa2Ty75Ra2UiPN2/7pWpd+Kfy

bFJC6rIIcxjdg4ab4HXh4uAqIlupdGSorLeEltAVdQODQUTHZ4UT78u/C7gzDVJ7GdWz4XBJMgeQlPm9

P9vgEUVEI2Twp6RVkRrNyxMaJbxQ7cinOn0M2heM/7
gU4c61E9KT69E1hxAxMOPDOySzESSM4CM5Er4YB/1wLjkRdELT36tvlcDhYR6eIBNWCZ+1X3/4Qr2Ph1

wPXXNieQBqgtnMyf3WImQwLUxXERho+eiIEdMsGqR/rqt8HGRdyO51wAnRniVGtQygquMORZSMWg8/tN

0Brx5s5A2oSE69u4QSVz/PoZkzrf/xcjHyMep9+PoF
pvPfWgUef0lY2WZaSrlwQ3YonRxer2msf/p67DKmtIq+3ATz1BiY89R0Y9zTgHv996DFRFEd2BWgqNOW

lczXmPuiUNlV5BVkFbM7jQgKfgPWtcbCb/NGGafMpfh5bZ8N5qm4j11P3v1UbhGx87uZV9YyafF/fgRO

TLouSvgb+4YFyveohtx6DxpqkF3Y6HMZxFkUDY/1HN
xkudhslt3HrFNyc6G6d7nEP890T/8xiSjU72MdYYD+5Y7wZ3zGWxkcc7ur17YBY1fmZkknPnhPpAePfx

H86oHsVqYSmpw092oRXLHfLOgeWA9DpEyROQ+sR9nOG5xLdD42LV8/d7gzxAMp+ipJC2HlByVCwUyiyt

ld+12PmQIJOD9nNjhoXUeBBERkP6t2a6Q+YIXe/+ky
UthAMp8XZZmSQ0b8e4WWLaXIHnRKFkp/eqREI81jbHFqv+o0VxnoNDRLBbPeXoGOfdWiaa+vzolSRxl6

U1cQZfm38dxHoD72+lsP1DgtohhZK8h6yT1xX0t6urSPS6DndzeKOE81xio+nGZoJLJ2Ky4gbMTgQB6c

bvTElG8yfoe8aM234aT4XmLJytd+1n4OisdUKHx3A+
cXrRspPF+MXQ7RKIKl5AXaceUcmSd4Q5uj0S9hWdOg8i6Z/Wx0IS5kX+/JIkOnirulbjL5VP03xlGOB/

hT/yH+5PpnOmp+etWvA3yNDWem7EbFi+qsi7VEKG0qUlqCsS1YbHdfe7Fen9nvwmHqr0fioE94YksWo8

gtRJ8kHtg/ul8DY03lLtdEg97u3Pzus40Ec0HoDu1i
5CymzeerUHKqUsxS7gksUv1jrY5DaugUeG8/OXjcO1vydMNo4xwheluSFlPfZMCkZgbZJ7R3KfDo2L2/

CZwqwVT4ol+isA51e6pADI0p1nILTpiYW1A9K+/caQ6Vn9ScdmNal2fH0TsZKe5SB3KGRT/gGHO42FQp

YbRDIZMKZidjJfyybMgZ3gRWgW0wVO/Zwpuven3BTr
+g9Sq94Zpn1rLhRDvhM8FMMxZsB76oE3Ym+zJaY4u6e1LAtu2QEWdnMazEom9R3wZ4d9UT6xPYRN5g3V

ptEgqiCBcMwQQkr5PdMKfQnrC+0ViKwIZQkab0GcnDV0zqgI9UUtSTUHAYCKLg+Q9yj6UtjCyYxw87nV

Q83a28J+SXMbYWkoxP0+xKCCsafn3jfPKkOreXEfpX
k/eFfZ3Hn5eKFPb5IR1P1/8YoDnskLrpZPTDjSKt28buZiKB0lb0kdAHabvZKWRXgi8UWeG4GHKQZ0SZ

uI/vf2gN9agKVBZNOw7z664Ft+zzEWGVrrQf7ZqSnvHNmorRu1lAKyR/8rvjfpgVR049Cz/G8++01TUE

XlF4ckY11JOcM3NW5ULrel/lFTGC042Nt+QpXZakEk
s0RpUayQhWm8QJC2/amg66VC+1+s9zTCuKFTTQVGTInRk96meEO0YGkr7IZUO/zcdOM4mDkFKjivQsaQ

aN7mxHGz+/N3wzMP8t0oR5V9U+pTGd4Xnhk4dh8nLUtiBMEyFC26uvPw7qEtKa2jD3Fx0zKUKKGWKRBl

Y1o/ww+k76K6C4ipom/vWo4dqaalAYlXz8o35p4K6P
e4OjFqwnm5ZTySFxjQxxT2UsPHoLnS5F2c6+ougtbOas+behGUBao54sRBJ1UCm8M2kIA103NdzLFblb

ZunUCQ3X+mdN/ssg+Kgl/4REBM8m8vESx2vx5NPekRvAOad7cFoiDwZn0CNgZs94PMhJ+fTgdygjadct

5d4LrdGkzqW/rRDCtY6J4GnWQMfxdhD9LkLcYAGtPV
DLS8hwDWZ94zH97iDoB/WcgmoU3qjYJZXDS71XgqRtDHitFeCNwvPhnfN2rxQkwKPws1CXus5jbQZgae

vQbK525oNLP5L5xT1ug2R/kVTjV+t5IQC6PgfCct/laVxIrAu53t/iJe/aqc22hiOd73Cmf/uqbOPvrG

fEW+keQVSHS7HsvaaETZZiB5hW9U2qx/LOyrz/ci3N
sZQ3DCFJrnQQaPma2LbnH8vzbIrIUNKgEhanmCVoV52TKJ5ylChoFHRFgVkW0EvXOXDsQ4nZHsHkzt0V

JjD4Ejch+y0PLRPjXra/0vW51tXc7jX8BkpbnjPv/BqL/2oYv7IR30cw4wEA4JzvxK00vUeocW57Cejx

6azsdP/lkyGIB3t2H8WPC/UDLZ4E5T1HpdYrlIFWGt
Qm7agcn/0HhirnwCbTk3jhp/NZcpLP4c6v3sl1pTYKW/n6oxV9g0o/SvK73YpCKN3nJpoIAEpEMow8ih

pCqzKOWUx6mO9YpUxoHmkIANkx4DWQ3CaOk0vWmYuH80YhaSYLJLFLXrVpyRCM7XJ6AoAG4nUQ/FCspu

3OINDK4Mj0dLaMiO7HFsDWBWfLMblw3Do8vZe12LFE
s6maCjQP908qeJvHjhN3slwmDVCoaZ1DLkBnjxhNA7Dd/CytMMiAG0kIZIj1HGmolh0BgEoKDA+YvqBa

w9JhoWM+MMfaxoNj0EF368X0j7H3UpnMW3GgWT9MyqJMVTSOzRPCqYD48ouqLUU0y+DdLcfD6MKt/hIT

34HAAhgd7xSYOMw7DPL0L0obZhh8C6S9vrn5qMCth4
lhL78dOt6f0o5W38ih/2c3q+QwnbA0FEYKYRr3riWnrbriyzG0UF3sk/AK3Nh7biQH5+t/26lUDNJHkH

4DlLJvHTuxoeaLllnRfgr+voDiCgeHiiYJPAQ00EJbRU+dpSyPkehwOgFsaRMAGc92o1y2t9ziCHweT7

Ixh5mQKHrs45F3Bbr5oyxv/14RxbbyPIlgr+0DqBl5
Dj3o+tQic7i8yB15hqxp6GNPe3LGpM5cMobt5vFb1Mxa79+i3jUn8ah01ArfNib78YvlOdCAQZZLupm1

IxHsi+6m7XfZffGFBDxTFf3tiZB272nXvgmPZz+Eo0S3x+Ag9ZkxR83RICGiwy+L+fvCbjI7gWwfiWpq

iawx+dPzZUBSicRgtKnOzGBN3crc08AdtSilzraQU8
0bP16DyPwasmwHukTAo+QRyhk4Detn+wvn1k3/4M5sm6q6z9MHAUqB2DScF5nwvYFrZiNh0G+oKhI1F1

/07X0ywEtD8HBzwci0jGz30rVYG7qjw2YBy2cTDiOJVakBd8py5Vfr9T4nHwQBmPZCzbNvQVjesGwTNN

5JsfYekLH/eo1r6iEVUoEUxUqvvq9FQrjK+1qwyZEa
cqS6uPmiJEp/cXb8/2f4fN40hshj6VDUrP3M+POPnrin0i/5LG3Uh44llclWRyCthGau2gMQrc5W/gS8

91dUepB1uOYNkgd2F2AfyMEnDWWvWpbPBB2f6fr16U9tajn9+ip3jPzbrsvR12v5YLhdVT0nCUj2dAFJ

xQUbk+tddC8/kmf9HsWGr5UCpa4kP23ojfmVA7MsPN
Ns80iiJ4OikymX+HAx2GqbFGt6Jz7WXfcNavDgiHg5UaKMCPi2c66t8126GUTcF+mOVe8J9kbZ9I2S75

eclqJ2RMd7KCY3oA+xb7I+QmDrliOYqkNhoSgUq5MvBWgGQS8MenAho7R6FEmk8fk5C6t14Qe93zAeay

K4akPjta12qtqA8M3klPcWy0yodL/mwPPEwPhaamD7
LXXqlpZPZc43ijK2NK0MuG3u1R18JKoIwKNAcIdFBABNKXWConiEDMS/rmvM1tdpv0XJau5uqmhmQVqS

wU1JyqjH3/3Oymyqna7Z5FgqfW+u6x+pQZ0XGp8/4vPB1VBgfkhaL++tz8mIRswu0MxhpogYwjz/6XYH

/i8scuwnZkXF3CFqY0+OtvbIbKf3yHcoaNaaX/6nYJ
LwwOYDbeF/01Bta6PNCaVC4MhrM/m6f8bQy9Vx3tTskT8Q34MZf61XMFBzccPTEXHMUhJ977xl5n6ae5

XUpLN2RIHIUU4XHvSSBtuZH+sL5D2vaScG++A+G83EMuzONHCsbYzDxhdH99uBlYWus/bTR/8vmnBWlb

0+sJt4y1/xB8Jnm258taxoyKsdqQ/58b2GXs8mja3h
8AbLHeDEMF4gmogDRmf52/LXxvCIItvTwxbuWwD/bCgukx7Mv+hiPX1KfgWGtUDKnOSrkPOzeu4v3KRU

r7cg+83lZVSWAUts7qwOoRwUOwTsfRu9PvhMrjrvsRopq2pWlt8OG+lqlecgzn/XSCi7B23xXddgPIqK

VkEQo+8r0ZdbDSV90gnWwkHYgvjnG6u9kV9I1/kjyw
vSmW2xQ+cWWF6iYpAUvfWzKl+4dQrjHv7R1KwCHO6bBOsF/vEzG4cuQX9b4J8oclP9a/fxWSMSzTwxze

YSneT0+S+XS69KP2j1qvee9Un+5jIilx9qTg0JVJDDcrEahjsFtDvu1tBY6rvC/o87L0Gn0vtduz2Ydz

tPMs7UZcpCjAcrRJEEg8UIxdrM3m/O4c8D1WNYZVZ+
CYYX21ocv73poiQ5vmSt7FDZnXbbDxLpieB0sh5GdkCbdiHSd6KED0w+MOMKERcsOYsRLiZHKTYhaUtP

bn++2hA8iMKu77gV/WRBBsQJjz6M9kJObZlVR9EAjmDodD88wPpO5GRLZPrhzXStER3r0JztKJcov/vY

7odnMGrjD70qpzK34ga9EYosxuDAen3CRt7bxmR0CB
89dEgwO1heBG14WxPXMS4sdIqbTSVyCUNnSvQCgfS/9uOKfEAmceb9y5TG6c2vGIevbFmumz11b0EswD

DDrb6OiIpiD38zTXXKvUsPTYdWdiJQlXfaXRSBVEPB5e/tXSj8VhdNvj/FfIJq3mkv+BrMRJze9XDhNZ

mDW4BGZHHvT28sB2WZefZJt7jed3LucamdBUZsntea
d/61//zgdylnXJaZ+rjS90XUgPihC65BS4f8BgxR6d0gCGpA/A2NE5HXlu2ZFL3Sw9Bkjd53M2K9rthW

TPn1Bha2AfSkTz7/TE0+3d6bKC+LmBkUg/p3ewTySKwGY0F0ynDN0T3J72boJSgyFJ2CW7iRtrNlW8p5

jEWPk2TjLfQrPlIjBKHSWIGAOxXBrKYW6d5uFPZdPd
afNkzFSYydRs6484mBiQxfOrHxnw+XBccgaT6R1zr7aU891XcFiPtDE13WxSSz7PixXTg0U+YTAA6UAU

uSnpxarx3vc06RcJCaMX1sXfGgmYG9qzG+Ic+W71w5VHBjamEsocXrCGQeNalJHMaWyQevTdGj20ktU+

1cg+M34MEQQDhAnCiEz+iR8HtMwuvh2fp+c02JXgpb
ShQIVU6yZiNGcqxZQq0Y754uW4Wn6ga2fwN9E1S7jiV3il58P7Fy4CFpdxWnJOyxoUJyDktVq+j+2Bxu

BIoqZ6Yd2TOcosuwm9FyGxac/SKSu0bZZk05OfnqjyX8GRv0UjXEbrCMZF4Wfwfr2J2hjjsZ8vfRJ5Gy

sMQgyh9y/Ub4mairOkj5aKZURXbBgrA6l6W6nyouZx
chxLWiWkLrBDKLWTI0o8vev89vAuyeCGZDsFMz4Cfc9koruOgeQRaaRKcuU2ikn1lvCZwkBLqvN59hrb

9nCHSiIjRS9JIv54OBkh1IoaDRadHRse/dWBdC2sf2CNsxtXlh8TjmN+hctdcPEXbUqwTe5ScpSBmsu8

/6vGX62QYA3reUAKGzw4uy4UypBr2hGCjkwbqQrtig
FoFLJ9goPEH5vdB0zqq7ifd0OZk4LfoyaFfbpErFtQkXAzxWM/wK4/Xk4yJebGjs3UJ/2qUi9nJagB30

wzthIVcZ6zqOksqYnZQZ87ggT6gsvKfV68OCoCOLYZT+HJujoKz6FSuikpRe+iJDZ0w+x3UDP1ZHxwL3

h/f2VdujngxUlVmdoAtpxzpfsS2g6ZT9qAooGL4Gtu
R8nacfGZB/lhJJRAYOksKEPjpoF2nyjsLgEN08xOS8Dtb24QDelYYhzCJHbWAZV9Jv+OAyum3l+TcUwu

E8oCr6wZ/yg/7/rMxhLY2O7pS/CnKTnQnxkmjUArums7tD/vt5fvXus2/DedImMfqKycCv9qf41EBSdy

aQRaLnYX6iBQNG7UF0IU5cwBYDXo4LhRPQ+/tIQeUU
KY6eQSNzFgv1KV3Hj3uee/Rmy0NlBypWysw03zNbbTZ8ZmETFa5d7CMyTv1+wJzkLks2p4hXWlGFjZcd

yjqF1ukCmbyJQjt3fo1Md/UY0Q/rmKRSzIjBv4k7YUclcFAFaqJhVOhienhg4afcHWRK8ikI1YNVDknV

LB2Gon2nOsHfBgH29kYIxwnmEzD3Bo9HZ6LsOTwRsJ
fS+5TdzHY3jw3pjUDK7REGQtAl40lntX0Jjh1I14DsWDxBJOdkn4OKmeZLNn6J1l2fvpf4CTVSV494Lg

IVaz/MLI6RGuaUGhJ953MCkqQ1sPIuyEFpsLzWoE04qHhgZSTfdUA+nJAhHNhiUK5E0WouTABq0Rvojz

GHZcCpVeBMrqBb9dLuP2Yr3vLgdCCZSVcrDNXoJlKC
kNDliFH2W52hVoT3dqq6oCerSElrnUgIxVNeXOKZPVnEDQn1yLZvYmOtvWe6kmM9zttrteMp48BKiO68

VCD0TM23+dq/y03GP+zVbn+l1o46pvH9/kJ514DUMi9tTHxlD7rZaWTzsZWExQQ0jUd1oRVcJj/HH2tw

Sd5+semgcfzEz+/xmYD4BNqyjNLxGnTHEumrYbz0pR
qvWn+Y52KK9sFxEAnVakFdNsGP8e7Kwkn+NQwYbUaW5Q4llBKJjNjRRgJD7szscyGBPu7B4CRsRNAPwy

+dqN90Pupab3GZHuXQNL1AiTY6rP3HFPES8Nv6oFi3u8SNBN6PrhiI55htoCybk8V3ZqUbVeSOKT7nBA

KIg4M0hSsEK3C3sUi51CBm8FSmlWDauDpDHyD+tsk5
kPlG1A4NuulKvd+4D87ISsIO3nlzFonldQ4vpG89xNuILH0gNA4Wur0daGaa065bAoKDinYxo+5olmjf

C+xscqgqd/+745FZMwkVgRDIF0VdvY+HalEm1win1V0t6M6blAlQm8IEUAFwP+NRNQV0R6ztAdc38BQS

hlXHu487et+gGYinGnuzUnyVV3aTkTW3ZYWRXUR0Bo
yaK9sUHONgLy54P/kjhp2ei4m4+e42OnBdc9ZQsDJDL62R5Xrvvb8/hw3MwxbgKfdjQ+qe8QfHHbMz8a

cRPngf5MIT26Q7K5zByc7WQB8CDDYVKNDC95Uec7L+s6BQJpeA+xWAXg7jZisD4DEqwSmY6cXjVtZCLW

S2Yw4XuLyux21PXtwMsvCth5dwfErn9FY/0Rf6ay3J
1Q0sdz6zDAapGYwqy6Y8B3yD+at6p5M3SEBxknjhk5+izxjBhhxVkJPUHwkU9hn5wxZIX4WdsLRyXWeb

XYsW+SS4X4956Zayd48LX7ckpNiQjslCXUzQ5gp7XAeGR8XNKTsws5tyHoXUAalko4IyYTAgAOeTrBgy

p8jg4LvwJZYoNfmmMUUa6+/6XDbN7O265O7ij7EzFK
+h6uIf9HuY5KlvssTz++METys2Q/kMgwPfsPqXE24YqlWhdJcGrKuu+y8ODvJ5CqogScY8TPxVV/ZiCz

7Qa9eA4m62hHyq4AdfFY0UapgRhEW3ksAlNc63m4H24M7lILLzXMh5DrxYVWgtuuqK3YbiFIFpW4JbLp

kjmbJbftVdLd42OXL0kBy+0ByLlnw1gcrtQBVw4HdZ
dxmYV9LdaVI9LYTK4Bhb8i9Ppf9Av6pHEQM6eWTNfsWemuCOf9AIIJeo8cT3IdDe1R5KgHBuMeq//9Jf

S9rlbdCQ9gFlTfeIVxSo46ttGJ4JjbE47H7GyaFcbLR5i/CGqPtt05IyGhb0tzV2dqMzpSojcgLG7pNl

yQXTbYQnaG477KMP5/FyEfHXTlZS8lWK7Cv5AdbiyV
vEQbYFJqdjIREkMXKsmEEKT0bjPSNTTj6Kue+1rC06LEEZDH6rTm0nrdB8zQULteaXnbae+Z3KCKhFL2

KM3XjAL8QRSkBKVw17iEVaWmMwryr/ZYM98iKqnMLmYIetXvJ3fEJM9t3jTtChm8UBKz/3IKW7kk1lZM

NCdNDhOUyL7PDPprihptr2CJNJYIPXPHMpxwTNg3dR
9y5yPMaSd9FYsRiGxsMFDZFd3p3ie7SnoNtdgtIjQdRXUuxr/syBA2W+O+9CR7rYMDQXWeZjSOd/O45t

2OfiOB7xPh3puKWsR6mfEeNsalXOwO1gW1LEjx99NPtqI4Bac07V+Ocn2BFdBmAf4P3iG/uc36w7JQjq

4o0tSTuwVUXcSGKN6o0N2gNOx/wTDdPpxG9LI1eBQ2
xxXXWPyvcrwZ5IRtNyYYjXv2HTCjSZ621kNag3LmTHc+qwf358SVuQobtj1xnlPLpPx5jO6pIEzneAzv

2bAPAUsiTLyvdKlT9ZzCp/V9AhOzKYu39s8N2kcaAygRs0SkeA4Qf+X6Hotm8XhWgdtClmZQOL6C4xdn

Jq1dErTzIUn4bwI4+pWfDjC/sB04xISDaKjXyfOz7Z
R2JNZngCje0Jgfv/XQnuIjAMYlr76nntmI1vRsnkLtiX9ljpT+4TLr0pGFoKXtkSlWp+L4SDNJDTcsWh

jsMVPfCk5fpmw/aRr2RrKBdUbXg0SbedXSTMm9ZCb8LQl52QRZmKfQo1L84/qR8pW8VmKjVJNSdoPK4K

fizGyyeDHTC8DA3Av9Or5RPYqoEvpSnBE0OI3P7mbQ
BJi2jD5jAikAhHqMyKn7PzTRYyaA34wa7JJtZQKhLBPjVbqjl2zwbAnYwnoakmlVazOQzD3iw4y7GAn3

5eYJzz7WFMtetBiiT+BT0ZTPgvq79legIq5GEaXH2xGkDsVtIYyPQy0XFoAj+MxW6GiFT1gIVHheljLA

zT49EvV4z621erkZB5gdAiv0qhDZKQ/8HjRLvMgC+f
pxuAshm4VAa53+DJGb9iFW+WGQMCuwIUCCWJ4yKyq/xa7EEWxh1rOSDNbe6V7vonNnBzaYAbpD2GBw64

SjpuoTc90A8mH4jPsD66buGTnHXT7GqYb3Y4KX2GBR3ZVM0PLroY87Dh4hv+86Mi0vwiYzgRnf+qfKNz

qWPKXNQ+thK0pY+5zaeTfQaMJONrFyN6fKRe1Ehq+o
pqVRCgqBB1aQGjwiS4mowsxNUF7mdSRqMeEkDQIWbq7+uqtsXqpDwU4X+SM2PPqHhiFuCwdhTd8dfbuD

cCqqvtasG43/zu50hOCspGR6IJo2VLn2y49AqaS7TbCbNFrlGOwLF4XZxwNTq2WuIyenmOyZX0ODCPRZ

ornmHTG9/DRCiOz9pQoRdoZMP7V5rohxwKXcQwjK9a
J/FK/MI2B2ZBTLhUmy4pySOJAUy1EGiqrwj0BeAqT5v5kaGtZN88C4JIrHIfDKVszziacLI9wGxupovX

fQB+WPp8tsziBkeNX9pIvZGCslIAIa7ndYEf4RNb4EwJ1tadlVkICX2Q05vBY6b88L/4KXYCL80LUrXS

zJOa1P923kscxqNTmP+on8m7652Tsl6A8CUWEMZIL4
T4Z7oaDBn7ScDt36oqFBYh5unKephYUrSbZicaSp3Ym6C/a+UbFqCv/BWYFMre7lSJEqBiBJc40yBkhm

zxRPTw7KQNTgJlGmbbLIpbnmTFDhimbpNSYQCTyazkdg3qtUTpegD4S+CWJw98mW+13I72OZfXPCzqes

chkAYi+s5PFA+5Fyhg06iruWDszkZqVSkcQBWr0f9i
dUoJ594YLSnsQhDhzEg7tmeaTUE9xUekl5z+vJ78VsSsUk2cVE8+3q8Cev92Wa99mv9e+IvinGK7C9w5

Tojfi1L36AUHoLWE8/k8Od3bMvL30KGE6PD60t7u0dezkXP4fI5nRl1aQGbk1I1tZCTj+HIfTZqRKO2n

AOE7aJDkhC+95dNcgHE0PGmRz7XxqrA5zuoU4v8CU+
NNTdhNvdvi/K7BqLd4QHenrq8hzkhtR6zF8Hvbcf4usOBtaJesL6P842IlfZV1XPEz9xlxU061cqZ4lH

Pe3jLleaAQjov3GjvFBWXQT60HGUw7H/sCLz6ZwO5NgDkfvXMY8uUtjxywVjhXu1t3kAFO54ss0CD0M2

XxmfPHj7BaU8IDRcsdk6+vw13/eQBhFC91MXkDNE/5
WGkfcPnApBOoQXm6mxL/sis2pmeoxubocxxMoqcSZc0hZx6W7CNQum+/So4rdvTWh/hrdodKHxFPJ3zf

cShjF7naEDt5ABVVGLT+EXHTevV5QFJc3VkbA1UZB0tGajIkkZMmZzBqOd80hYHmDYEN2FTrMfyDzdGG

7mj66ST/ixtsLs+OLRT9HlKfL5Ag5IcsYyZAqsRH/7
8652g4nD3ubDXAbsQv8p4ufJ73J1WgDbWYwo8b8ThucqhfNhB5eet62wagApruyyWHfOJA3nSlACMJO9

++dS46vvM4vstZipRIXoGMZ40iBpoCs2fSJGClECWKMcCQYH8TUJLTPhvk9pJwNmkuSSBL42LFXF+qvQ

fx6d2vNERWHZSSxWkbFl65+WSs6h9+tNl89xBzgAcj
Jl1/OfwUyeRpODoFFtO5rFpniWzPpofVH0ZCgREz/EMGv961A5nzX97ENNj2J/hi/ZCq4Fb+ShyfGSYh

kO6J/3J9j13MEF/GfmkGoK+6hVOtUA22Ar9LD+3gIRwfNf7/SNUTL/Gut+PkpaL48gndZh47eDussDvq

yP3yWY28o/ca8Y7NonnX1B+ceEz8VedeWB2jkRuFna
d9+xs3N/mWM70GKXRDHtNswhVGWFq6aniqo8umFrLE9s7AtDK/YMCeqqgQGHrOQyB+kFgtX1yi7q6x3w

2Aav11qOUGFlEHQFdOhgdd8YNq0hT3nobdrKgv7JCAectRan/8B0i1Bvthi7eWGKuEbNRQQSVdnnxoVA

CuJNLqiu6vPFwyVDqFpAHba7Nty5jjTokkxXa87C1f
xwyItDcCzFGOO+xdIG0csUAo7e9Z15QEDdXopoXEp0hoKiORPcit/8aCc5XQlWyR07lwpRjVBmoAo7kP

9+7jwPccV/1spK/f4c9K3PBe7BTQSRWcE4oKt1FLGr6BpRKerWoaKLIq2JXZ23YQZ3Q4m5jjBL6hHl26

GTruNBPuWc3DYznn+DW8Lfupom8/+6BbtbFgyaqcSe
uCepe4jv32lS/h1zqLc7Ndz/Pkw/JAHsYdA4WvQ5HqCOYkkKi9Tg0n123z50kwt0fbDswsKhS3tLiMMO

FW4kQEZ8PMi7nnvEo7lWozjEAWMuO/DtcmXiJhVjpfsju11fcvkp07C/8TrpaRULwR+i9PJVfVhBazKp

ycjDt2v0C3X7Qqp4uv9R6NkRZ98WMiE55at29s72dn
kyi7eoi593ensL2swGJ3lA2Qc7fTc8dzQ1ewOkC6/OKl8es195msFOU5K3w1p5ic6cA4T8b5RiKAyBe9

dj97gO2bpRb9yLZdzdNi5UcVC5DoD6/WShTcfdDdjuS9VLkOL0G0JiqjomebR22xTrpCgFBoiNkj65GU

uyRNFoPkoIxCK2JYErR031uXCQGC1KT5oLPJZi6T06
+5Pqkk4LFSfn2vlA4Bj6p53B01bNeJ7gV0mstm8l+M8ZhhXo6EDJs1tuOW96RqyFnvFg4TTzPBuCD+d2

aYoMbT927vxjnSUofuxgKN4LQq4pLokADWtoWtedYQkeEmvbSpTNeO8bZYbDaxb5ZIndY5gwiDtySBbc

3UaBElWDf86Lkf03CRVyel+FcbjOhACNRT7H3V4YiP
cFdjuHrgb8Uaxq/1bD7O6NuFevG+Ub9bKlHJPa7VbjcJqpzpw3NIR4vPVUfICMl8Yx4sZwkKdgrNc08f

PGvHJUL6lbHpXjWhCp0MaWnC6TRWvpoD4uW67QLdIQfhEdSz/+nslb1G6fxxbNGbs+18nR+upJ8T/c6e

193R4PRJIDPwq+Mt+58jhCiS0CkR7R35mdEAlC1myy
b1QEM3vzqCtUwgiyQIG7p+US/QfHDucLNgriacTe/PZd5uv2xPJWOm7XyVOIyyuHVCAs75Xq86wYTrnj

EOmCNrWWSTOq2LC9M8Zq78eHTmy0INa/n1D3jW4YQT/gU4uYfwZrj611iKK0P+f4WIUSd2bG72NK/Aay

Qr8PMXSBjjoFpreD66LYLzwYQ/58j3ovra1tRejdIS
mH9N6I8NJSWRZ5gVdWEeJNUAEazN9EWDdu0W2nOCrisu+XwDnMxHZkPaarN2nDWvZL2YIbEJpWMpZE3u

1JYaICaDKVKvFIwCOq47fDS8N70zEPBoDkX3rx8IbRw7t8MmeqnXhN5tD9SdZGYHgrSJBuZV/IShDkVv

v0jkWHmJdNzvi8BlGGheFy7X28Coopbtw7oPAPm3Ts
m60z97dVJrrjP2gTk/TeEZvEBQj7WT5v3+pKVFHdIINnWyzNLQKv5pwKO7TGr3MFZ3oEG7TkQzpHVkc0

QKHSWp7ECNngTyLSRmODM5Nj07ad9vYYFqoiidB4vZNoZUFkKUffh3kRInX4Gz7zDea2C3jv60GapPpl

Zpci1gNUOsq/64+M0OJoPHh29myQIHXBjXaFJxx/z0
B5sFVI2NxpBS6e7tPVLLP6J29aoN1j2MQa4ow5fDcjyI6ufjvDSasfYEjy4FESL2iYorf2prU7yocdH4

rEqcxUA/BBInBTemh7VajkjR5akOqnji2HPKisEoYs0bfKgUaHZ1PuhTmP1bLivTkXdQdOK7hmg8A6nc

t6+ZvLb5mvAlOMhg+ON9QhByFnmWgv1WC+ymxbfk2w
UGW4eHPDSjB7wnDgGntl/ZuMGr/7v0F3JXwyOieq1yMOzDIDXXuz1yx23ae4/WxaJsFer9vGQS/N5/d8

UY1NH369NnYZOB5/XPdo4nGuuBrJ3dkLIMYwkAOjyUFtubiQS0fIgXnuoglOFgbetFVhmb/iaTpgeHtz

Qe4fvFUfjUWSWnWRNPOyK0uYNKhuhP/7dHVOLYeD66
RC6LsmvVA1ZPnGQuwgWD0ngpUZ8OuXTfhU0WGpUGaWSGDnf1M5QvX2Xy11vTyWkfeiFYVYjYKggr7rUU

u7RYDzMapOhuJOMuNRdKwPlyCQpjMkv6p781uwye7Wjnj5nInZTNoKrE0ej/kkU7LY9UfmJYQTXvLG8Z

zyjw4D71aU0Qkzw2hemJ1wMUt7Hn4pFcbpnQ+bt7/W
Ufjg7fYiUruUQBaiGJZfdCPxBsn57EaHRAF1/K8UrcuRY8pPDVK8pINXDY6OqQVzMS7+Oib7pHeIRwG8

p1vw49315Xkncrr4Tj/rWe2q5FkWy8BKZi8+rFImaB9LGUykiBsSzrlCF88D7Td2R4vhofI2VAV8oRd4

LKH/D5a5ygk8KaPCO9VfAk3Sg/F2Yi0woVmPCDRUPd
gDVFmQQ5bLR1yhT4U46shRTbiwKdY0jVd13diZAZUv2z7/d+TW6N3960uA7dEZqeWIo2Sbr5XW/tioY7

EwY5iJyEQjGfqPPq6yjx/c7MDjrvilW/3yu444GcSbcBIwJ06e9U5B6nZ0fAqQm64qcmFHQcxbBYoM5P

1tQ2/pWwuJ8POYv6XCuizt5C14cuz8LKT3m7DtAymX
dC+k0vyy57nrBWDdx949shAmZtvDADdxUOr83HzcuPmIqOB2HkkVu3wel7IgKEfIhL3TF1sqGt/c2SF/

oeWbK+Ts+LH210RgxDRyq4WacTAus9SmN5esL4nugTHTwuLmbl4f3kdKVbDUROt593KhhaXzMJO3HzMk

s6lanTZn6szQcuBdXHBQyLUT0wuvII9+JS0NgC8S65
1R9wJ1ZNJKNAelvObDb+C6m35DmDXjYeeNI5hX0FfIpMqTC3spdSZ+SWqi5Ow7b1iweXJysPwOoCJxOL

naeHziDGg6dkiRtOrS4ZDEZKBneVEzkv3G8OTl0dxAj1VyDnf1ab5bB5NenHp2U0/gaanjxQPKvzGehh

A07wydTyjwwFY+pCx+6xzBZw9JT3+RSbMsi79NegNh
5QFVIdc8Y8UyW5aHUpT7ziKUlex8iDv0QHa1cFoK0fMQS6qZL4quR0H82at5BszGnVTrjrCH2wSDamfP

rrjcjV+PH4nf9DhgWwCt9ofcy14YIop4OaMlYbaE2w6GCG/FBv6kRTTUiq1Ag9uleup5pyQEgTKK6ABD

GOV8lxjh0OV8LUzA1hsNRRnsqx9j9H6vC4y0grNjHe
YOUuRrXH3yKpjKh/2oI0nmYQb4g7sN1pGDJU124TB5BGNnUOrfHXKo7kBm5ZHvUd5YNHGJEYc0aZA3dP

+SmFPm65l293UF/eHHncNzxrc1Cfh5sTFFDoz1N7FGuAFLWfF4y9nGlTjY7AK0lAJhXjJVOBx5NuirYA

l+wHKUaA0HQbBxqQdBv7lKX/vV7dCnFZulhs3uSTKW
YrYxzMfY6c4jsfFLK1tbZ0O0dYNTfYAA5c7B0duH+hFnyCtjUs6GxaKTdqic1ErYn1vN20xrqO7gdpJS

nP5ixfX/0FwmDgMs/0VqGbp8aWvkKmiM4p18ieRuTzibv2vmg9ONugXxEu3jha7UAcEah+E5utK3zPQ5

zUATjDLRZt+ueNXBnXemtKL0oMXOxNbQNmIkGCfi4/
vGdj/sP3RauafV1FIsNnQ/HrE9amQpPakfs4f5ln4wNJY5f1r4OY93m7A2+NCBlVtlR2A5w4NjJkztMi

jSghz04X2ag+TLRX0Uvhz68uDh8fWGwX5ZUd4akQ2v3n4ZYo+lIcTN7wQEluIRVc2l80EOp7bpzm1grN

K70mNkQSZtKfvdmPGCrOXL+uCceOKMoVziuQFBdLzs
EfwoAX3x23mut/Mu95p/NLNs4D5UdkH3wPClk11O5xxyNcs04Dl1lv8+Prduby+btQqJ67XtmCQgAHKQ

FzSGPHxoc4TfhiZUVj9TGFZjBM6eHjlbsRZVHXGJItrWNxkmpyTGPVbwRcuMjdoZNnwPZqbWXkz7GU8s

80e1FtjKWne755s3RsVoDy/UCVMvKUdZ93sseysR1N
5tNGVcdbvxIxJ7lkswEbUj2qHqCzfUjQR3CpLi2gyuqw08QM8pLM+BsgxcKbkv/UCuZsDmGrIAnUovQI

nUyzKLxqGzLKjM8EnkFRJ98u050m4T+JtKjnUSNdHfVkzYuhIX+vw096vACkIXztW+YXxs+F/Q4uA0gg

4Es0AMIxgWAYOhKzLSUMq5fJtRsflfcibh36XH1joN
qXPV05iUc5BbsYp2jbYqQ3PDsN5BRcHz68/7eKCU8k5sRxA1sR2wMTvT6S5imm5EbZr6cfo8tohblPJy

J1+Cjyd+72GEJ1H+agZ8bbkCqffyaGlNFSUExH13OvQXr3EThlWjrx46fV/nsDJG2/nCU9gvRR3hHW9D

yt7URsRgptJmO2JfFioA40VwVNPuh38/eRYIwl38WC
6kW4THhEqL1I9cXVki/lTqA0KW5OD5oBu31pz/yGoUOShxAO0rVYg7C1V+jhBVn3vpcqh55yjPylrBNV

3Q1YcUBC9e8vtNMKtRsCk/V33WRX5ni9EcK/dKmG2/KA0kwNWXxo/PU0+AqHOlhnHfSsN5/H5NyMw3KI

w/DQqpHbauuN/SmitE9zjjeg8jvWxcM41hqWiow8Pe
NVkRVkaLEEn8m/U2+8QAje06JSIu3hD48FttpXvSRKWRTwq2iOitj/l0tD/9oA16d3Mk6+YB1JmVQker

1HhzC0WQ11QnI8WyaleCR4h5dPCW0I2XHSGK/n8+PLs50W/zEzrV2Pc81vMdAA8IZN1lgs965hh0fcnS

vI3kR+Z+ARfH+fy7jcIwe9W9htx4ny9RihL+uvra4j
hGMLw0O1NIg30jrYLVzUtCInc4EZDAkouFJO2SuZ/fuj76lqo9rOk2FLaoj633W6qsknfuVxNCFtj0DU

XONOjip9+aDiCjW0D3+nTpGb93/Tc9TkIKo2Bip4OSjBtZXxf8Zli+wycGUzzesPlhDaW7CyN29TaNAA

nPmc3Xk87jlgAjwb9s/lxghSxFLHoC+dqbB97wEAiv
6AoT0oYjUszXS5m7TkQaC2uzri1l2mMocb2TmKiS2nOroSvDQTowOvwPjZ40UBsbeFkhgSJEoWrfGAFm

TQWcenUpCE8kdmzC58fBC6bOO0MbkvPmUbWrk6daBVE7nEOzcIJuEX0EbpekEbBzq/zh393cRhNlKKLl

YXEbdZPeWs160aZlktbVNYDXY+kvnyxL1HxLX7jYGq
vFYdn0TLSn/9f0XIYMiXCckvAfjR9kAqSA5Lx5ox6lFtPB1J0+QA0BUhJFDuTlN5xvR2P9i6NoU6Aaye

GoT3eQPO/93Pup8+yIxSELVuVhoV5GoQZGyz4CrqSfMX95hpJPkUZIvJEDh8Jb5Fmif3WGKyeblLGqDP

R0KA+50AyKj2dZrLgaKAG5gph9aEdPWrokfP7Q+8L2
H4ZdjDL6xNIwdWZ81LxE9yNf6wZSBHYodkgjReg0C9kE55B2ZP66/o86q/y3vUElpyCKg/w7079ax+Pi

DvaQFGI+knZ8klbIZcMDvXzk4m0vm1zgtWIlRsichpIoLHWkTPLg9imj6X0qbPX5aVD1+jElqLb7xidZ

fbeDTw91pXyWbDF2GpEVg5LyEJ9bP8K9Wkn83yZ8iO
+ZhkkOS/s6JxJKyF6MZSMgxgeKCTQn3Wqvwom3aPmp4trrZbK9TyG9JnvVSSNvPttag7p5KCoKZzbvbi

qzT5ffZApXYx/Q0EGdpKjDeXXqGBB5dgefoOKOHl1Kuf7vebZo+Vn3Qy3UtluL6U7w56eh/XwHJJMiOA

/+ZuksP/UcNj3amKPd9cX3CsF0eZ60ky+2JZ2lr2m8
T3LE3xBDiv+OE9nyHayTGf2fQh+D0jZmE3/A3bk2svv/HtlDuN/8IG/G22q7R07lzWJDEIZD5/sZLfTZ

rwgvGAWh9f/hUm3dzbj732+S7i1DKSrMzSWDXW135gPC4ubXyvzlTmlrmDC9C1z/ORARdzHzYJGR5PlN

2AN9oTLFAYuzJ2AAlbnE+1tiyO+d0f7S18Rjkkw+Chi
ID5xTV/KpBguwWvq/AnvH8uaYmZ4sKR3o3+Z36+uvEHqlxEAYwX5z+bP36KFpFwJIT2ltv76unqYgHXQ

e/1lDQspb4k7UvQ4GnktHvIh1lbEcF1fYEKvmELJgIlkoHTfZ3ATDq3hmsckH2Mu+WIThzBCkfrG3fLf

rkajeYMGr5zJrHsvINGCCSSiG79KY9awtOCVIHKUft
Vj812qVli1tXl0IvsZcGtvaMiu8ImZb/0cvQKNxLRztI/98c3Xjgf3AVBi989BgY0Djn2K7yg7/LFhxA

wvt29Z+KcoLymX8bc4Ej1Os8X/yTxdRSK+yDuOsuWlN7sfr+O5j6NgwRY3SxblS9oCioJvxsqFfptXap

Xr+jafeIa/lhehFkX1vHMaKbOHiTnSaJRfIjzJf1Jn
9vgGCQRBn+dGNiNfgxUPjfHKODWDVdMiFkmAen2D8r7raIHWpRqNByafZH8vWt01X8fBqKM5ql6/iy8L

Y6aj/OhHDH21gn9NzhkgeDK8PXtiC4Q27vHD2NLhKenB8dSQhJxomSo0X58GHOmifz6tsmtCucvuhnPA

LHJZDC/LR8JHbG6QExMIuF7GMdUauaRKmAlImr5Zzx
7NW4KQ79bErVjbSDjeRYt1VpvY0L+OoHPXATYxVhLO3txsu2H7qRW6y4eVFq+Zo2Ubt0ooLFY1YuFXh+

lMZQY2Ddhpb1utJFTnllMl/iHygi07ElhZwo9MNuveM9WOiFpN+i7vcb4Yv3GuewMwNLwkDi2gXcWshi

U/P/aFmAKqYxOYFeOSgf8Qw47Lo5aQ4GY8fDyKE8bw
IeBWMWKQTvkCIFUw0yA6OLRYts/1Ez9XVrNn37sKkx6rdMEAQZi5I9a6ie4KRqadwOpFsRaYbR4yV01z

X7peJ2yprZxM8oY+9EXeeWhBbsdP0HgNGMthizWP1eDjbfFQ+xvlebhiq9t1HoK7eD47iGLIkagmfh4l

jVNeAnUvBWVd7TSdaKxgQyCXk4GhUpFKZvb8KN6/ol
khS3MatGKZ1yValIJr/VDFaaZv7FBt2k9OOLDRmL3+whoGRjKyqWOKbWkRDheWX8K8NVrtpyF6ZsSLz1

bM+ndUq9A+nSH3vMeoL8wBuousVy5+qLfzu79Uw/I0yxZPPtDe9D7VSImIEV8GRFhb+w0ULXANZuv38J

k3aqQF7g7Ea2Vhzi5RIeg+36ZR/Ah6ndUJTPPcu9hf
KYofSuLaTt+7a/TQlbv/i3Z5zGdrmZPvT8aB+W7U8s0TVBYpJCKPEjuxmmFDjbcWes5KQhc8ALjqEsZ0

5I8wAKR+YZvDuBLD8XfUoL9B+OGk0aELYSgQY45YnsWTm4C4lCVvgDrmmLvm/BVKm/BKMbUoOf3v5E4a

T74yRAZBLfoUE20DWw5j5HB7gKmh2RSGpwGpBx4+9l
yYWL5afkIzM3W3V7WBxXA7Lfzk5xh5J9BtufSCWEyOBEgS5JIC6CeNHYSjPw9L/Ne9rEOqsdF5xKp1E0

TSQ7Y25sy8ITti5DZ6W3fFR7d9a1WXyD4Pg/Y6h/88qY1HkHTYkXAaFF7QNEWnJS3gUCa/JMgJ4wnjcJ

8h1y7glUCEX1WmnYgCru67ndn+rgZmvLhIByTd/Ot3
wxfNEd6OR/HnRwRgKoA/9DZyO89iZaeTyTyOWaRKgttGU97VsGp7zd09lDwwWRHPGspTMv3n9RXFE7ZZ

+YwAPtrP8Zlytlj5lvxsBCssR9W7gw6PTfx9wy0irNqOxMPQ2931Xf+yNVUAWWw6o530WwoUKpN8UWiR

Shonda+POUbf2ZjkQ9t3ewNuoecH1N6/B4wRWsCmrDAv
1vu5uVVGX4gwsLryjbKTfs3imWXyvpFXHo0wZN/3D8+KvIixcc77fuj+21p+olVjDYRPtR5/1R5YSJES

Cfu1tNPB+/w1dSSjItgpY8Y1r7JWPJ0H/cBSqR8VuhIEMzy4DY4nWlAmQ4rWjl8fKqpFOkWlSsaApM7b

bvX6aKJc6Be8D8MwIiwacy1xz4Vtr28ryfad48mdyF
VwiuB6iVPOMl/BjzqH6lFLOS0K3lc7T3xYq4LOB3SYhNTLICZlrMD6AJJCkd2HKBN3ZAw1Djx6KsN/BV

KD3F36pqHd7O4kUZXkNOoN5ljUpzp+831QRNbUb6nY37xa6/hLfRgJ1a4NuHYC2+D0LlPV8i753ALAMs

HpjnYoHUUywiI7phmEbaqGbyvFt/IcsNDo4NTDwygG
KX3/F3Nv+KX7X57EN2LyCgH5DogPAU1G4OTuiJDICKMVTzKSEifOTRJMX5i6QI99iAGRVSw8dez2WDzt

x8q60fkjN59e13wb0g1Kcy65ccF3G40fyaXYdkEN2qoxxtMLf5/+BLuRr4B4mZXNzLzoucnk+C//wItQ

HZxVOfHtDORwrwiVHIVAzh4Xu2tUlo9XXtPByqFXHz
4Al2JC+X00fRvvOJe6SxEzB7zkmgvcM+eqoRvkXPV5mSlzhpFS9DZWgF3Y8+Su5yUV9B/NLjlDd6CTCz

1HAjn+OKSzNWejyOOBptWidXvn3QtzMSoyy89d0DuUyE4r2/Y7tUaHdM57/2lS+WmgIBuj/HtjGJsGnT

FYq3e7002UOpnOgQlaeduL9v+wUuJrzqrtqkoI/WaO
wvR2Y6t/uc3FaxlDhwepEfc7+Na1hjANJ6I/H6dZrdACVENXWcJhKDaVjCeYg5VDzmMdKQCHkRuMC5ex

1QwmUi95KCvZlPdmCr+zv17zALWqTJho46Sb+o4C5dsextLSlnK1rznj4H5beH4Q6I47KUnDGidl/Iak

V66OnPBlsLV7zmz3E/bF6U/BfwG1TfgBj6TzGRVdNY
+pjRB+lVgbmtLA0TBumsOdvkPYmbkRcTq79dLRmz09y0ZZSV9/6Sz26RDEsETt0JnYWWVHkSwYNzRYk3

FRn4KgJPlAnPgRxwjMFz8C4eBzcbHF3CiHWZZQQ7iAY/LJb42C+XRwsP+fbpqdRGDyE6b1XWzEFFKALZ

14L6ax8/glrTI1YdEvRHjC5e2W+Asku0kbFtMioCsc
/Q/Mrh0l0GQViGlXk0rTGz338fcPqvTN1WwJdO/Gj+ojwk1POSlhw6BVyc24r4Z8ErlMr/bfOP+9PYFy

PoXClH5S1F6HMpsfT1bH6kRPxmiJx7Bvr4KDfdVfF+pIcxrVK7FwQS+Wr1qGeCM4j4vXiVvT3k/VAdXh

MadgY8Ev+Y5/WkiFBsrC8g9Q0zE8bDAFEIfMGiN28N
+SlK4Oa11L4uCy5EQM2+4S4Gyqc+KrEagJo0yrcbexm1WwRa15MUiEVJ80FTYG8Lz4LwgjYIukpzDptc

eqeHHbBaVdUjetCsv03YFIsgUo+7VAlfKNoMMqpXtpcPJB+6D2fjpqGCtPDaiDq8iP4XatB4Z38Z7EKv

wFWxn2QgufXRaI3LmDg11KuMHalTr37MI9wfVLADfw
VYkXdad+UcIdjgdbCJIa08luKJ4eXFfvcf/9hgmvHhGz9N5B+JalI1wkF8srmIAYArkqki/oJtZmzvSB

PoAwN9xecUF6atqzoSRHRka0Od8//Nkxfc9BFDqnUi7gZae1gXOHTf2Nsx4IHeKmOVArJaoUyiuJS4wK

4p62U9fnb+WqotBPNV3cZdrCjvm0TG6IdKq3+BTR7j
fk/xAuchSHS6sdqL/8WjF74m7E6Q2wNGkTlQI9Z6dSjynNBw4sVqqcBj5SGVdZXUAam53r6ZApCkxSQq

vnUTfQzGk/2BjbTIpaeItC+z8tnUeUVM2KypD2q2TmJWjiXnYOaqQxKIRJfCw68OQQxu6uCbp/EhNiWi

N2q7k9oUduGQZoQver7b+8I2ywoR1PCQiNY/Jm9aWc
dOukZrJN+cgw1+CWPbMM/SZgI32r4e9JFJP0e4bvUSQqW9p8Dr0RHIVrzVE6L3+hq/NjZfTvqnVJWlUj

7XNLiP1lp9dZFtQ+MXuB+NgcroAuIBSvxSULHsC/83/jO4n/98ghrjXFWK6c8QqIIwRyOpXgUO9S9puZ

zZ0mIOkZ/6ZB5MtYOKanwY9scT1zU4ZeThLMAmsb1R
/HaTsXG1ADGglb4sFI7SBxgnPHmLPsFHWGS4oepkiheMLbLsa8eSje0f7o34OpvoMVJKKLO+OPNPYaGp

jmegCVuiPnCux917vTgssG2pv+RZcDPRTP45IrljiBtttyKBerQiQqEeCA/piLqN3yRkY4mjzLyLZBGT

AoA8ZIidA4CxKp8frdX/5qU6busCNyUYN4dWXHC6ae
pgAYzoRXBOV8cwz09z8p9E2fef1sRjkYp5QWNJHv4NhsN8s0qcB0DAsQlVWS97FvWuZY/vAGoBSCKzl9

0HLGV59LK1W1S7L9GCb0ywXxiVpKSjwE4P+VLT1es4kY0xNGxf8YMgmv80LYc4PnYohpaZJ2IeYAiY4p

wosesNh8N1sSuZ2jch2WX/GjuRyXErI+nS4zcIXLVe
uncAgupy8t9DQVa4QACW21uNW0pV9KOFo/LIxqJgzm8Eyl9ShX8tUTaCT4goJgYdvX245H+/e5rYTHop

Rlgm6lN0XcDGJyPb+IHF2JhiaOXqXF7MUb1MTlErShVhYu9zUAprKo3fZ24+3rtRo943eiHwYc7m8w8L

d9IWmCFyOloyDze9z5OPH8cXBA3d+bD7bRgnrznGEC
sif2MKtG8JLetYubt576avkcAE1TGKtXx8jFbdrl3Mg0auw1UahwQSboWvPjTUeA2TceJ2Ib8jScO7i1

YxmaZRVgM04SSWrPP4d3MKVhXb6CT8Z1L1yYR8RDRLPkw+QWlmF3njeIeYyKUP7vQMXlOUqbp3SZZ+jv

58PW1Pd9dcC5/fo1vLu4oNCP1S6uvlSJFrt1h9/uJW
36aNqpDqFyMZeyX51J7Rfqq6BqWj7ZwVMzGJxt5LuNY2dKiP/szcAnzPdNcUqmJ8PL/4hJJnxOGiM9Uj

Dn/8Uc9P/UKZq4AvkPySJhWNuRqnOtmemshoDDedt5GLj8p36u0GyNdm30WCN1IWHRHMDjaSJBCvyvDQ

1PzUbtbVV//e34LlCR8LnrbQJhwdRa2rxt8JxAK6H/
JkEEWY9LDAZWMAlaMWMJm7ws5TQ7T+84EgCgt9YAXoUELQd++eD8f4+APXly5t6+4fKKFaBgshpDAC0L

+8J+MI4gJSFlWVvbb9HFfeu3J1KODlnn/C4QZohgtOJ+1xEmXH6agjhaA68J1ilRIplkzBpkLRfwiP9e

l9z1qqvSimdMSXmGucTHhJL9D7F4kTg+ZbqTHg+DU/
+JG8dmLzsFfjM4oPyFnCut7mFL/4tY1rkscQFa/2/NWiSVvRp0vxq2uRurYEtuNQ0tRXj96zP9L/L

kOaY4iFO7GUmnwWi4jsmTqJjZk0ErPrXoXKQ0TqCZEbWMEkLb/n+F0sBX833GqrnYW0AqL3HcxB/1n7Z

rNvqim0KbZ6oCqAev0entVemL+zrBkdR1vdAB6A6Mq
vJgckBTFBrLSRiPn76kBd37B2E7XNguMighrMofzK+3tQjOdcYkxeRNDNmsFt0CL05HXupWzsdKN1wUW

f3ZbpNe6Iuk8lgxHidVksyQxXzPsWArmqWzi7aJs9VIHuWn8pXEJ4ukpo89nI2PK4NexTs4BoSL86r7P

PgghxElsyr+bQWj8jhb3qlN1V/MepNc62FRF1IIv/T
i1Fq7KHI9jlMzb8kdfma2vwVfgqoTzdr6EmndIgkEInAo2a42cx2gYc9JX6oNd0fdXCS6NOxxe2O9xb9

GFoFUOWPLjhQ1XcKuQleMAyIhtN0nu9r3rUBC/4/4BSmUYS12Sl6+Qz7FkOO2b2Giq33mWFMij6WBia3

96J5I3a8Vd4SQaUEas0NwPfxybbNmP1/HZUVZJj6yJ
eOOvL41OvRIc4gnR59J5ons3yuM4FHx/OaPabRQshUHort211z+sSw+2tPi25vuJ17wpJz5vyw3i+Lr2

5ducVKMFYOF71rtAQjdSRBs+QrohnDkJwqhWGg5Klbh9+NhPfNqdYBtkYvoEeg/mNSFc14byjUEvg1l6

6y8FYDi8j8iKFJb93LX1F/XS9lHqBiUcW1KQCkyKjB
mEwtcSxhHonadr7E7kq0t7Sf3Goy5X54w5WSpPIUsMZiOEq/Am9JRk5jowHtTz3O3mdmN21ZQQesN1ky

dAW8YpJABqZBx4ztr/hX6PEYV+qmibikR/eU5BW/qIZ3xzs54jmBYs1/xpsaqp6TEqt3+vjDMQy5KrNJ

x9DJIovtJZIYZQI+wM/8V+C4k5X1b+qmfO/dpIwPzb
lyyZfmXh/MpgTohautirUNa3b/ZsR2CW1HTqMRfdEgZcKTm6lT/SqHAAmAJjBVCaXENxj6eOIkDY/r1i

D45tvUfYl8YuZ+ZoKD+9JiMBs8QxrbupFaJxvaGxR4LRaIq78vze2w37e5sfxfzEIeMEfC/KxNO/5EN9

FF+gy3W9amzH7xc/8VZCe3OMZm+5c7US+TMZXV2a0u
c1+o7i0vw8rFgCL1aPVFiaHgjdN1Xvc7lWRqgGIkV3ZvCQUrBnXEb7Npz7sifbHikF1RvK65qixfiMQx

Zp65A6sY1OkOUp8MeLICl490VCMHhVs0XuF8WV+0j+x/Nz+MqQDqplZ30lCtEKxuaA/w245TDUt6p27o

+ajGeMdlUOzb5cNfFaVLG7Q4630dbbuSuo8bMjjST+
NvhM/6UTB8glj+lfJ3u7TulRonZ3zZbHwGRZUawP1QvZqVlH9nGxSz3j7KHr94dWlma4wZSOvEiN9emJ

5YGTiZwDzlEflUvzYmWBX6X/hfxyiJU4gokXpx+ZmsM4KRnRtpKxaQb56HWZQmIW2hZQa7M1L6w+6Vnr

Vg85CI5Fl/B5wvxmAJ1COqO/l1MOO4KkeAuWZ1w22z
pPyquESqUiOrbW71YgPB5qXQyY+uVh9R0NVb05jEOIee/ea2W08jHjzcMTqAsUs7qZwXlEVKwl9pFERS

abPsAG+/Ya4XP4yYdIY+y3XcEovOJ0Kf2hJ+9tSALPSr6lzQWY2ZPjijhivSftDxtQQrTZoMJsSUnJr3

xL4vvWiJMQCEFbW3bM/aIyPWwON0z/6OAMt+GHPCdy
3bzEcg3oKWIJL7LIbKEuVrk6l3kcgFR0n1sKbuxnf6ph/MPKFQ+rkorIpVbG7JZZ1Ro3Aa+6umIcRalU

N7JMSqR+FoC9LVPHsVoGLooL578Sl0vC9vFf2//8htV68vxC3td/QKJjnz8KRe07dZdhG5OBrH1w4eu+

lucía+aBKSbsvY6CfeQxzb2dzuGoSTXj1GiPHYBMYsBhy
lvbOcmI0hF88AqKFqWY8EDqEy6bFqIFgqgpMaUWoctIhOkueONY/hiv/m7+mBAav4efWQkf9C3T75kcE

YFmMrYrZvpR2s+px19yKvTffrkaC3rhZnqDzL+wHn2OYWvwHixS8DaKKfPAY6fGtHxZLHl5B2fWldd1k

GqS1YNsyKKTmMUJ3wwPWdyRxOg1PsBAYfoSnjn7GG/
jaWD007eo2NY4nMDjoVJ18XuPmufmU5Zxzpqo7aiL5vSQrhHZmem6VIHJVogNTDWZX1jql+augsxQpRT

NGXsdnckhLMUpUv0pzC035cBuV5oRWiJcftw8pQqMPv62vz0yDbnKr76Ou+iEdqCZai0qWhzRfcYF+W5

Xa8ATsJFud+Yp23OD/4ddWL/Xbw6/N3p3i2tEWNSGF
o5yW41ylc4grp/W7h/bF43rHnuQKLlNhzSlgi4murUq8/++fa1tLWgDMGrjUcmy9AolQDh3o1xNExK7A

q3G4tNMoN2i6aClOOr8nMARgiCJQo1T+uyu9fV7mu25QKx6x25hk4obgA0PiDte8LG4NxfiKU1ykNUl8

NqFJlHRcNF7TpiT8K1vN5Mfw97thyybhx3yIDSgcFK
TsuvsCSop52g8ANsJ+adkqYi2/E8tISxjjCZ6o/LB/Dl1KoUYwgfbq6eBfV30iubSoxSEh/Ar5t9RAQt

fW6e/eKSbdlt0DX06lcE1m5RpjqU6I8VHqYxCZX+LbjsCugQdQX3lTqMXhnJ6EF0k8G55yi9giP2Os+l

MpjRPoRLjQg8ema4JmZVYmQjjOKtPd9z23iLu8VnM0
t1+m72Z1XizOU4gYBfHnrDxi3gq+pNwYvKutTP2T/FkVvFciiZ9H37r1BnKA/mJfUD2zEt2bzZabfQeA

QTnJg9Sh1IT13gkQGuJ2S1bD/s3IxdnYvYBOhUfcDyiJofmKvei2aJUhNPMGlG8Ba6LT68XJl8ZOrn5o

UhvQn90wi58iCtASKS6Ctjp4eQSaHfeFI+Y1yKk86b
CUtRg9X8Kbre+wC37muIzteHHX6V9A3yJ25tawvv2QJ4dZkVT8f7Shnx2S6k4RPT0DUWPp9a7CFt9Weg

ygz7CAFs6oGXlwUKI9MXU+xptKjLmjG/A1YEZ270O2j6jUb3MF2+3+0TXmvDhXJkvZV4kgBkRYFtzkxq

N3YNLzOzEhbAlk/FB05FWTL1tycjCY4Nra48SH9MpI
5af781zLK5sDI87ZOwMMSNziDPt+PMSNWiRYOMLtGrJbuSoJzWCeVD7sa1vnTZ9N6A6nmjLHhx82zRDH

78fArIcBv4Q9yYIzs3czJB5E5EFJ8oysi5KM/Eh171oyRdmZ1ljlQ1lP8cRsg9whR9uqce+SK2U6DCC4

CngeDq5PO3FiRyF+XrnHY5FilBD56P54yEdl7p0Z1a
JtnB/NYTGEVVFEYgy79Xr9kI+N3VcGUyc3dxmFvZiDogibuDVOXlZVc83y5yTMRACn8vQ2xqBGgNSJO4

DQkGPNnbDhlZFdYX7ZYnzKUbdabw8+heUV2oA7U2V7/9bulu4PB9Yhnsj/pXCQuXnsPeRXesh8F2JB89

cI/O2QtlsMnM1CR7V8j+u1EassZ9lqn+F04P+ZT5xF
pkd297V/t8LssgZ41GgmJXcS1nxy77zWTl0gyFR21irk3clRqTKV/nFtvib01nMouHppr3tfusdXeYUm

KgM1GNiAehh0ptj04hicMT/t2rKhpMQeo9J/0NYkj20F7CV7xu6A9Oyso3riZGOuL706vQQm8f/BGDBn

f5S+uIHd8rnl8NJfCpgcVVvz861nAkzmLO5QGMLA/o
Ik3sWY2t6ESaMJiPxv4mI3pF40rvKfTr/IZTbJbZ+wSxih7pFKVzCZhoXLASuBUcOHbJxxMCY4dAQqmD

4edGYUL83m5bYD3YzwBo7FXhf/doqlDN6CAUn2SP6lx0HwTt5wUAczF/pcWnGo2Q1P1VfWfR08QAx9d2

JhCl8FGDHqCBxAS6yRiIiz8fYsqxRLXP4TlibTSZfO
rdUX4t6pDngE4ZhRrfg0cbw0MRlESY/E3G1uUUa1Qzk8NoerhHz6yNBZYZCvb5uO+mEve4qW+Eyvn81Z

D4EXtRZ6H2akWc88C5PD8QXn0x88Idagqqrx5i2RVh2G3dd/qW7I2LEBKqVJT0qdfqsXVHUTq/dxh+f7

cuccC6m8+Wz3fTml9bEmU1zvAzXKt4OgK7A5FkEMEh
2yIPLM62iH56QYdoRf2m37O11J2Y8WQBFQQIceTjwqQutc/d8IoMOf7exV32ThmxH7VvoSlsDLdopVZe

TAYkCnb3V/smZdUYvVCxXgfEYMnN1xhPVSL48QGeKWNbkN5Yl6eEiR4aCN7NoUM95BjSbPgBiZrjEwpy

MbG35d++XKg7gIncM4VIKbDld+C1G0XUdKcxZ5egcR
STrGBTb++SyEOP6ANmoSWkZYOaJTN4qZ9f+iXLoBIJMeqLLbG8DrS1dhSuyRBsTZpdM5R8S/wnslYUnL

w7urgr7/tIOwYFFj75r46oh5YpNRmSXiqPBUNPu/LaZqNTMzdfVdAYGj0NcJ+wD33hgI+ELrfbnIhfqD

Ube/YEipOVuCBTcGNdbvGW0eUe4iYun7Z1Ch/wJTD0
MTlHeqm4iU+/JC0Xx229Kjesn3wz+CDZHaI1EWv/fyt+fE1BFHjaqEAidHoKmmKbyaB7J6qnm/VcjFF2

cG/ER2W1CxDIkL019AD8hADV9gkPzQS7moDrMxnBztriylz1/iG+beHe5hlHqT0kg0MpHu1e/L4koLgg

iIB3Ess87XTldv1mlqata6fMNEik0pg0bdaUGOXndw
IAitc2rB2uxHuZB6tfQj8P5Lt/CdlV3cYgbv6zSyLbBBgG1CMrdtAU8T45qabFlvw/5d5WAPSz6AHdQM

YZRuzIESXWrg5d77vEtZo00c+nWlSQMYamQfD3/IsBuP7rkcpP37jOPR2nQhk8LcWS0nDJ8rRffstSzD

AJp7dOBJc+nYFtXAHRlbbwlg9Adps9SZ9rYE/ro+BR
IZWLMrXINNefETu53xMLn/zi+BKQlAYBimJkt8TRnpoO8HVce3lJ35g9O1JFr6cw7KgK8dbo0Vlh236h

M6rpxsHcfuBEFZR/q98eiSaZ5oWWV92RXWaxK8/FUEXXHKgKZMP4RSbhlPI9YKqh2cyLLfdZ6tCm+VOy

ZR0IoOMBYhpM8kycAilJyndUwdMWJBgRQaxoRPZSIl
krkpPzxbyDLdCSKLzzOuzt9AwOxSv8+jnbuxwEmrjvxvbLae52NG0aohit00Q22+VCeygQ9Pof3DDePS

73C73jMQlIc9DjIxHcqUdjVTi3wxgSUoeBoswfvG059xjhFI9ucVbaB9SVWAqqkfKKjpt/6q8Q+1AR1X

yMj1mQPofXkLuildVOzuX+jdUAHpcIrLDyfdXTRoH+
jUoWSSsLczvt4yYI1I0HpMbUkYG3VdtZ/eJ8IKVk2SNm+omqx4HOfJY0jlu9s75pMyfcS/ERSPCivnMH

kUg64uQ+/jndq+VxiE2QNLGTIA94qzwz+Bd0S/1Ukycyd1+FCcXdCeFp4J1//EpzoSmGNaKB7Y9zSOFb

u7niXN7ozETlqOIyd4PRtL0im1oxpG/+9K9e2cc/Mx
J33UFpZdTQYaFFyN34LPTUA7FsnJOIMAyy/QZYpKOuafQgOaMt/XxZ+xKp0anJnwYLhEnwVqBdJRk7Ux

RDXot28abFu2YBIl1DK7ZmVt0YUuaEVbrbviKnqX9eoYEzGU2Y1Mski118NLw9Ms0535Q2WrXNs0Cg6i

MiaRzKSGJLVZsNh4Vu4t/gDfonPBYzOOVo3SA9BvwL
2Y4VgGhuEEUrNS3fThQB2rwGNAT83zumDRbBW8rIMiNymUtweZzkXnscOKGyxIoEmiYbRgbuOsilw8Bi

Mav98NSrjcj/ugMAFrWeelTPgLrLo+9tbn/PF2foyXKSM9nbe3Il5QlbsTT7zfLwvi/7dNKenw0LaYh1

rhs/lJg3VscJfafXBxvdEVkf+Rqt8w1RJF+3aoL8dv
2Au1E0Tx2CNA57cTuom6R6iWLPSGRQmG6ahKsmZ6i8m3lF/sWWK7wP/igmlWr2puhxav/kTEHxhVQ7kG

GatdnBG32ejxBSSzbZgWx2FbO+rxaAa93iB8yzNg2HzZvSbl+dv3TFLq3M9TUlKyq7oeq1uNZMKu/V76

KqB4oBmRhpzxqfz3HudH6/xA8pgF1cYmFOs9mKIedW
eEm4yMMXw6P0J59M0JZrSuHRMjWHBx8XD56dY8FDRhLSweWApvAQFM6gKco4OAfC+Jiv/2ifFrUsMexK

/ymagMjoQw1AoR0fTK6+qd96xFgBE5Elqqpqt9dIpT94yXN8UPf6bGgNfFxih2Cd0W6GkAhGlIdgAud5

zI0gYONtRn+uWTCXI3augzrBvlcZaeUN3hzi0fjttZ
zTDJhMixrrM6agrhpehs/aAb7+FVtxIfwq+7N7bHC75rI+xJqKDmANgdfTXiOedR3y/2+OfUkEE1gUVi

IpSd4N3x8JwxlXskZy2hyCiacfaqmr9C+JmVG2LPrK+OG6ijisJvP25m4tVPgJW5ceCY+zSLqnvA5vhC

2TaWZVNcCpgQop8JV5pGoEIivVNVmZSDGCBgufNp68
/Vkr7v8ZyiVv6aHHSa0I+FYmjD9RDAofsidCca4boj8eOiexY+Mm/chqkKxw31UNTt5E4XuPXk8D7VdE

WOYorqiQd/hvNjc6sHB3lf6u1Hzpuine4kW4hBO2chsG5pHpPRBQi+MpEr+RDldeEX3vrKrW5fvw+9Ch

RgdQKcFQ/y6Fsp/tfwqXgtcIQwyUzOrTwQmtvE4mUV
75TFJqCeMbb5dCGFKCMG6v2JG17ges+Z6G2sSss6MWo8k2WFdEQaaxvHUoqHqwQ92PrF4ukhls1Bgk5b

xzUAhp1kCou1XH52pvYQSeIK6aUzbfbVJ1cebW/ne2bKfDYNdkkcQLxiXW8xMEwdpS9JwQXO1DXC8aeA

GE0ywSsTeB/StxMjxMYmkJWkBUX9iUmAFel91fYe/5
1NrBDMQRToiPCQKsf59wZiiRqOltQdtF32s3zG8zoi4VkLK6xS0Hmsfi95cnXSs2/1GQeJcsjc3Q7pl9

I07UXOn8InIXgLPvP6lcVwhPFqN7834qPyWyLXOisvWcU26nkfiL3oGQG5bd5RaFI6ZceyaPEBETzeqa

/WC0iYUW6LCnaojFW/H6g/4aNx1y5pr6UzZN5VZYEA
dzfak92vpVNu5Pg3FAc7rZZ9+1EhzHXeozmsPUvEoU5ilaXGYnjiIYKrhIdVPI9EEQ5fUWktzHgLSlfl

eTdjmwLzGhzq9jIAUaVSpK7cSSF6QTYbm01TK+xFDau+MAXKSRQA0hbRXUQ4O+Djpms7H8zXxlm0G6WI

Vof9i/LeSvW6ncElUYeQDEHazh3QENn0IKz2E7lOaC
4WzU7gwNvwsXGAQRxCnh2Q10d67IbQrzc4CPwA4YdJmUpxD6yN6OoDO4CkqQCgxDDzQBHyEVtfBwZB8U

qbYNqoW2h0eYpMTGV3zzH6jDRTtH4+e+WVAneHUgAqBbFBzjEguwV3Z24D2GmoC6NWnlgHmB57f+bWXS

eopglvirRj0gWBfMod4fTcAZaoCiyi6SA2OppumqA5
srnJ3+kuVGmsO5LfBT7UvtzKvps/26VH/+t9EB6Syb/iuFrT7XCpUG69hLrJeTdkwJoyzy6UZuz/I54r

klTASAmj4MuY2uvkS+YZWfbeELQuSeXCArwY6kakrAD38Ib8OusA10rYKMs83ZcBffrJ4LgtO/9JrNsz

9xaRogH7vc+mGqvekNivF7oHyj7sYkRJc0WBz3M1IT
Awj+N4NCHA7G2kRj+vxaO/cAOUgeVUYfGKLdFk8gIwIW1YTQDN6iWnKvgKnEof7XkosbPnhPtShZk3K+

tZivD+vZGqpnP2qEX25XWGrU3ntwV8We8faIbgo91zjx3D/8deRkUW0JaVmf7mZ4s97Zx/e99xz2g1MC

89S6FEADN3NvleH1njCs+ccCCu1kNRIIMazXS4zwCS
N8J9ipLH1OILgnXsxid5e2aGQEwzXYqNu3t+ur57BFVObRwIxn3WKF/7mgsx7Ikko7v/+BzfUuP3o5jo

MUNj3XtxNnO2nwwTZTuPRErS2i/b/khZD/83B7C4937Jh4qn0cc3kTVJteDc2KwqoOwvkBBq3iMC4B3n

4NVvWXbEjNwQjYWVf8Q/VRBLfslvjPdzciauof1FHW
Wwus1zED8aBMOw4ML7culYmj+YrnFyQbq3PGkSVB6CiHi1CT1q5IaZHp7wGqj6g02nEJuQ3NMIEh9B6p

Ijb6RqrY93JoXENu2jtp7hLvw2tUzOhs5BGcTlczbP+cJo4dJnz93qf64OtgqPf0jdXo4H8IcYlfAKv7

lTfPrGrGl4f8xa0fbEB9zg2HMZCTrW4aTGA3RsavPm
5TDQTolnOng9d+5HWcxJy8xx1pLYYNBBQ6pZ4vKIFbsbhazHDcK1JIWb3TXlWF8CEcOcmwuazKzPh26o

3vXceEDTxzfwiVJpm2Ye9uZXh5Qsf2M9MNXiexZ6jYy8wdsIyp40B/O2mInCEN6S3aaz87EbNQwW1px9

4kRCg2LoLaJk066XMjVyg0KIKgx+X0uk/jRqSTlN1M
a6HoPQoxYm9Y+r92xfHgNRqr0lyVthPEoAhjTQ4d1pwn5PLK7HJmHZ3DV8gfMfj3xpuz6mSVpJpBqL+b

vw34Pvt0APLcl9FiyBCPPnKSom8GL/uJfldLDjHpph2SC2pgV+Lv7dNt0dvNO59VX8D4wWZxwFrTefqm

OYkpB0j/qJnjLoo7Jx3I79jma4347ml3BdwC3ZwPol
3AS/5bKmfBBYiEXPy0GOuciDC1R7q+GWZInkTlG7czTGlRgIKrrDFpVnDT2iQkphtoLVRkfCLY1Zi32s

VKnSzJgbw+lng6XSIG6GzaLSc2QXzCvv1b8Bwb4Gx9YOjV21YWw6ImrS3YbVJS6Efvtkyb0SvPdn+Qj5

g7etuQ89d9t3iDP+NcbmxPAJhWYXjwrN8+t6oJuo56
FcvRcYQ55j3YdAugW5gV1I5oENPsqjP4pu9gf7fnIIKz8BrVQffZEM0imQau05uYaxtAuTE/vhJPzHw9

4avqjRlB0aytpdICJtsQ/ci5rkkhwJyrvpgtDb+lrwwBhNHdGwsteBZtWobP185eONO8OFmRiZzB64vL

+vz36N5WmXzQoVE7jvj8J5SrAHFhTO+FahU7WuYmBX
OOC+Mr4edWuh+uDND7t9MF+lBq5OT8ubdPpLcQX44TZvPa+9lHmQeoP4Ysf0rzV5X3Q+7VN99MZg+DqZ

BJV1gN0+P4GpSKANEo/12+WLF1QyV8qIK0VMkAdJKtTmulwzjFBiVLnzxrcA9KpGcBVvn2aEnURPwB9p

FzHwnSIte+4VcoaktJww1hOVsK8PS3SS6idCtOEgZF
6rF8bFLghE9218t1DvUoCD1PgjzoRSir3L602kk5iW47iX4S0JUFDHaM3vng6PvnblXagQRtiq9R8kW5

zO9JII47Jzs2KpylClYMApgzbKPIJrIDEMUyh1iVE3F4y0MBx+Gwb/JgpoCP42bkXVgPLm5pJRGesg8v

/1pQlhdrWodlc3FO54nO61Q/JVLWrF+EcZYrCs9E7O
iWuhv9KyHQV3PsW8J6qWZH9iwR1XxVIbYjT5KkFfUHvDxWA781GmLFlAl+ZF+SLrjRx86LD/zYefvVbl

86RHdYnwB9buBvJuQ5Jfk3lb9XsCzsO56GwLkESwS9aAjCzPC6OMbyeAXA880gMNGJA3zViUVnjwcEPK

VPt4HWVvhyvxfBfb+ka5IM14vsDgiHl29e7vTTKOBj
WD3IsAKu61pLqWTXm/ow1J6XGpB4OZ/ON4Acx8ml5FIgE2BxtOO8ru7EwyLxDP5pMHZeXMQG55cUc3yg

OYqe72K/41F+pPYJ3+6oOz3lRC+PpKzojgbdZihd45VpDde0B4jZvqzuUKmfTlpU8CGd4lKzg7EFE5PC

eHpJF2OrYKlWR30mMfVB7rtBQAH7/O5dGn6i7AReAf
iC7fZiryaNxQwnhksITuXMA/9FKOJndOHApikJ9oKpxxJFErDb99bnCA05dqFeXH4EaKqBYUVKYJtT25

k9jlstp2SJLScatrg+i/4MSpzBbhmh9ANDrJbW9lzK0/yozu8CGLYPtKEbQij/ygIV+svSg+I3AmcgGe

2KMXHN5+l8EXZ2roYtU8+MYsL0Jyx0Ek4gVuhDkZMB
yGMWTvyyfihme8l5g+ku+0x2OyuTyP2HFGuI2Z4cjBzxgjmQdRFfBucuQUyohWS/247xeP4Cayvzj6bl

aMh9Gz9E91Gymr2o6NqRZ7HclRaihKpVFa9ZIY7K9zQYnRmFM6vT9QclhaEcWW0cXLfeUontV9OfsxSt

aq5qX1A82YB+Ruf0mgGLsjBtYvEY2za6HSiqu3jNPR
FylyqWImSVICY83/7Xtdxa6dMewNsUDmFBi1wmxuYzDlqBEaTOTltn93MRUeWcOZPAS4RcTfKxIjMtZm

JEzY3aRtcCoL0eQ27ETPIE2GGaxzG9HlAh3/HgJQHCvjcx1XbXxCdDzCo4TGsRquBM5PMsSpoy4RF+Eu

/kSfsQE1TwjhiSODXWe2NCTLC+5bSMU/rVWepPAutj
6Dsnnor5gXZeAlstewRvEcCe63khxtzUrotHpSvyee5L9x5Y6qJru8hUgfKGtT0RBFzpYfwO1SzVIgeS

hpG6ob9nOefeVIm140/XFXTzHIuSkXG59ywvO30I7ebc3dQ6BKFNOHVPT0F0w2DbSykE/Ed0mVyGAiHf

THZSNUB+vXHvSGFTO8CL/b6kdF2mrsGnHeVB0W+iRP
hwTzFPivKD+CLfcqgFmxiTa3Y/XxS0wNLYBM/UT7+oTJUWjHf8g/GJquZ7hL8pQX2TRZs0LPVkNFQ9VD

AHKhaV3lB87uYXl28cXEYgSUrtAOCj3dh/uk/QqFzo6s5yKXYL2C4VplN3ZcCJyh81ZMkSHCLw7jb+yA

REvkNncY1TfSJIKnMStKCHy9/ZqAI1XjdGk9H7/Wuy
8UxCxtLz/z6p+FFvbKy2IFMlHHuqxyXixP2UJ09sg9bYGSryhT6cKVLfqAVBkzkOr+TSJaFczwxH8tB+

8kJ7vJWJtGN2mDpPxyZfzRyH2wyN+w+YuKSXqRgqqQwoGLnCKOgAgEBdAf1vgjj7jCtHsE8Swmt7+mFe

VE7OfNAyBtXtd0c94TpIwSQr2/RtRGIvpoL7LZjvZl
U6Goc1YN5P5s5hhx6HwJt+UB+N/DD3WUT7v9GNxl2RsgKDUfrsW/qKiNzTOWKv5lELTX0+BKQSpTFLR1

/1sXt0zggGbJR7FXQ1fRMbaVoH4WUTkM7ezo7eO8RCD1zpKMej4L8rF4YDy4Bxnnk8+VFnB4CYJKn5xM

FyhA7Fp44AVkYsSmL36m2ML2WAuCDwj3Gb1BDmpV6S
Qcx63Q0CZ4ljQ3jiYXsBmOLiXW0zMKFDuvlUVUahIPaQHDjaftCO16/FoNF8kqdEO8V+E7zhExpaGv7D

b+jLRGNsaLwKt8zVQHcffc9QxyYTD3M0Nu7+qU0J24As1rEDiYln9QVzEGSBz7v24I6nKyc5EAX9LwhZ

2r98et5VbPqizg+Z/vY16s3F83gLNTwHWFBoIv/cBh
XUFco0L3NTJzNq676PjsfNdqsnMnvlgYRJgTW0rGnGH0KaWxggf97naL11PWyCSP0v0Cvg+zHPjAWuQ1

L27EY5bpd8xjcY9NEhrR20akq91wn9dGwtZmm2eWke7kAZPOLLZYku5WBppOfhOGHlA9T9ifGCY2jDNA

HzIls/wasw0GgsPw8P3qacPivp/G7s3AqStj/N6KIY
PmqtWR+t7eg3cetV49LeZeJuiPFkCUEztvjMWBowq6x7pbuCX3KU37gWbto4mZ8J7PciLa8I4+Gd93hb

bCvcHtsHBZHVr4+ZufMosJAjtr2DvoH5WFx46tEUxvaaTaBzQojP31lj1p0m8KSzO3PR3vsFsyCoyzkf

1cQbRQQQJeAwXW3vF+XVZ8fCgCXi8jPqOW2+tBsOON
toMIIemIuisA3gCDxqoz64K+r7IPCANAN8QmQ1qIiCZ1gv4rp08IsrDEr0g3HXwQvgaXHeUz6kc7p+sc

df0iy3u1S/B92ensp6YrWNLV2uH1GT5c1Tb3Xg0Ux7UwhYwUszjGEQkfCVIKMTf9QOG7od+fZ5/nJU8r

UPvuQCy6R2xJsSvaxB/KhU9p4BBzeBic71mC4jZ7mx
91RHNSEDTTaKdGr9MLlcedg4IAsMcliwW9AFsJ2ir02Dnaz2CXaPDEpUvuKS09pA/eyk32nlt7xsINFR

r8ybR8Gk4Riwh+atBv+LcH10O3SrtoHuSr8luYKLm8zMY/XPx9W+yYzokHjDKGDw6ChjAIGclInXjiGl

S2q1uDadI7os6QNdcJ2fulFSkNLspkYO+JWhYePt9Y
mEazit2my0DvoEcZ1kK4ZUoJqiiYsR9hHqHn1QPezdWWp0hKpIf/OISsYpc2kTc9UKPg9fHP7mh8/01u

GO79WGP4jgBHhZVaRlXx9RbCyP0fj8jmCcmjxPJPG4xPC4VNsbpjCyd/0+foZe5N40jmIS1euBzTXKKz

oe173hiukzohdmXNHs7qVc1gH2D6NIBI63xJxbudUF
0Vg1miv7MJadFOGKxWGCiA7mdqdiVZS+fsTGFoHocHUj32nNWLU9jpqoqPZe782v/mUGv1b6nJJThewD

l7CXFWFqC3yGxaWpehxfUroJYXt2rIVBGbJ4w08HAZJbYRpk6GXcTJdunej/c/+lt1R1Pi3mAMYXvqj6

89VU7AVj1CXnXeRgZJ41cfvIzZ3j4XhpwDmQF56GWB
KXI6Qvl5IzTdzF7j6CQ7ishzWUvow6LlvF+hUZm9NZBHT8PTwHcBAQYDJchU0kur49Prlr9ET3fw5fTK

29ZuUWN1Cmul6GwmMrI9of/P75KoIeq0/7rgo8xTu1C+gJjdG5cMwS1dvyYk+d1/c4aWtJXlbnmKxHYx

7YVlseQrUApzprRfBAUezb7YmmiaZ3YHAbdSh9zQhw
YhL1GDklxfm3RwQ81O9mzGtov1r5YD+0kRgrDNVwkAneL6wqCKS7OhxtRsPOTEoIcrWRqUxA4FT7Qcgo

SQCh0fl5iysLLDBQFwtSWpIbaBqE01uEUxb26EV6zGMMpL83moar/d6v3P8NySQ2onkUCfHk+meo4iNV

Lbqqq2KQcxzF08kdRXn3mAZ14/BMhYR8AK2CG0ln5O
gmAvYMGZOAm9K1pUhAsLNgxdTX/+bsRFujdoGv2Mutok3ZHKuk4KsYkG3mkFppLSzaw/Hv4OxfAZk7BQ

VKnoXDc8ZsCuV04akLZj4OBGzwGajbiu4JFkRiQRfnWNVtzueTRGiDv79ghS1mSlWKM0u7wNz4wH1qVV

z51tDUIMR3BMX+G2Nu3rkq1d5S8Hn5T0QKnLZsCdk/
QKJr5z35NILUmQlkWCMHN7VGLJLE/a/SUfXpNNj7ghGqcTloFRaA9nOcFQfJhgunkY1VEHiD9kbL+uwd

Wg9Og9Wn//sXdn+GeCdiB6ooXvad9tbZWHVc7z+URhZOErpKE3P/7GlV3TtylWsUj8frzHziI5RDb5bf

AOjL09bOfqSulO2xL+z2k+2mlWieWGqE61RnxYbglc
3+zNLwqgPE8u2PreF5IwlJxtI45Vz71g5t4Eqnmsw01Y1Fp57s4KnV4dKIpzzEIDIS/0buLR+aHjJV4+

fEfZsr+L4bWiVRI0Q/Dt5HAm6s9DzsYRYuomtU/CM1KPu067QL7SNfHyGOk5BJioLtVfooMnU8Pnvebv

Cm7qy9xYbZw/V1H06wz7k7/ik1GxcClNkZK9VhGQc7
jAOJY/6QzusL0r4LikQJQVLAQitWABReti9WNfBqlamJ3b99onM2Y1s5acBRn0WzVj/1NfTfmamUfFfe

DYefd8KXjBw6G1fkKBX91V8WeRV1j6nF2roJJdaQqfnzU1nxmczZvPsnOx6ClCV/Ph+eh9ybJuf65sk4

7ZywMOTFxS9uypfp2dCiI21RNMihYxaolZPeKzv0Bg
Y0g6csfOzFyiCYjdAdplPlBvospjoGGDm6RxRbu5yz0/L0FWKhPbyCguf98beYcIvug8IAvNbu+alEgT

+aeCRGsxTLEJEuQr2w15ejU+Ju1VfezSxwZZHyNz6YkaSiC7neWkn3Wi1TGh5kvXqQt3T3iZpiH75fAb

AZnplyKSGcoYnlzmDEno5Fm46PqhAXrXmGhZtdb4Pi
BKBODOqJTdvuIzTxH/Yzs1F4Ysv/s4TnJBvjvxGYzfwOeju8zwZ+QALkbKJ4bShwop4WtU7WseY3YOS/

62NAuRtHDU7jZlvAdePiRhvC4uBOd1Egw0MBsnWS/M9uV68CYRHK2O8Jka9KQRmA5LFQltSg04/Sp7bv

BcYsv4FGTz19WXM/sFUSXbekT7BsjqUnGPOOutYikN
mz3cI/1Uk9M9m50v9AOpI+PyRx+isO8z27ieXY5ZR6UJdcRLrw0aA0lH2DgaDY4U7/pc50tNZWZ3HxkF

SxbHP2rFcGIjmNxrJtIYfzcq7X49oQpAlaJ7dJxKJ+B7RRkLUPLrKeJROdbjViA2/Na9LjlFaCg8qpqO

qecUgKN5lavfAMQ1FlEjjLtViRXKKV1WzVy4EfM9m8
LZwOcvH82rSwz03tEerdUt+3+7R9bwqkbppSL2Yjr9ZYcnLQnv3Za9xCBu9BWCu2ceNPCIC6hF68DanX

+PZZsklP7gZaIzQ2jB+EiPoptOklP+T4mwaI+Vd+VmMU5eXLwl6fUktKhNN2OwnEnbKdpn67T+q61MJR

Jt6UOoHCblP78GWgFDj+aDxhnWvnArNhcTrHWr6agK
/RL+YjXKhgy3mWM7hgcml9WOBnqkhq7fhaY4R9f9C0XyZExuz7zh+3Sm4iVJG9lRor+9CqhszF50S+my

1eHKtt4Wjr0+Esl8t2HvKbJxOaB9ysTt66+yqGh5m9n8YaWPw0GSTwbfLrlYPX8rFZwfhY25xRvSnA+A

JMUmRFQgnENwPaAxxr0i/60yxrtThu5GlDb409jZRM
CUJsi21cJ3U3jYmR13LMZbWgVOdcLPcsOc9KpGhynejh5vzc3nh/3XCxE/Wb8dGMtgrMEV4PalnLcLC0

Y7VQdxe3cCx7RKzAkCCuzJRle4MZ5G7xbA7NbuxdXT2hcAZTTgXVhkwbqML+283TTAIJ24SxgFCMSNQL

qBLE1yfEVMPcEL18hVcI81dQUefQvYNlRWIav3hlqX
ztm3/P5MWS8/qyvSlfRGfSAjqpJg7jmm007XNAqYz/37KLq6cair9Zr/yeIMO5CS/j/55CMkPWDrz6vK

i5wPZ/CvU6TEKIVHRUvJvTVpIInLzj2SR8gPceg0pDV4he4dPP+Ywwk1AUwjsqVWXkQ+6o+AT2abeagI

fy5+RYuqKKLicUgWRm5fozUYkr9uEqhy0cxVQkKkH6
Z+U4gwFQmSY04n2WnIU6LkvffcMvzN13wPjnuoR86gqE/PN6LZULCbuSOhJSxejFt+cmOJhKpMDcFpm7

J5UVYSNwJ8sm6wE10T4v9VSxbNFkDncd/gp/mMHV4GBzGhalEHW78W/Vdt1eO3EBQlzmnSpBCDHYrkN/

wqbZTGna7aR8ZuPNwat4G1ZINmzS0lvtcuc6enKvxE
h5I6jE/aTqGjFwnVzaXlGefs14nBwsikvpZcEvjMSaBGZGzjGA+gLACXhLInk0u6OjtyoZIKt1fkvFpb

Kw20f/IWz+oe3/dRcuuK5bW/RJUNG2204lfpSmtU5dtd4cZKLw34lupXdlSs9iPZugQ8qhvFZ8jN+twA

r6l28i3xVSeRrrREdeM3hiYZI5Kh4QISmPTeZj+Kirk
qJL46QUX1C9UWqFinEIm4zWszGT9vmqkPDRXlFIGOH4MOLDcX2vlDNuGVijfjWacEHJY5TtF9vpjN8+1

xuceRdl+8Wyt6qCMh80xZm7gO/XXhvMbKvYIpuhSRf+krX1CbDkWhxN9ZPlGtQfM9EPUVcRPcnPcVF27

Ws2T7KnIe07rMj8jcMOGdIqaJzt74xLF4M4/Q/TDJh
N67wlJ4+WXzO0/9AoWA/EccPwHErLvAfPCq/bHDkkvctX7G5Sss9ckgiT59W7Y6a+Mb49Lc7IAWzCBYI

QBmpNA+6rByyXtXozCeqSXLtQ7XBFBs+NacGhv+JMLcGwkHQZznShiiNc9q5OD0y6V/yf8Z0PDovkF1y

xik2r7h5R8J0rDq3W8e46c3ElDyStZR9K/ujJ1O32K
lHvaSXqJ4hcQ8eyfzmODa/ffJPkT+W40Z975GefJVdJucXgLngxDI1VJ84eK/2Yro5+g+pcsXawNwJp4

JhC9HSBTt4alweAjgD3nLq+cWldTeRVF0j+90cp7AR+scYHEVC54hpbM8aNCGB2cPoBKmq3wI2/FCmJg

wFnTNsxmCLvnWulmuCU0dN7HrPvRDD/aHK6T5QNG2X
0A9Wb0qQEG1s8FSDEhzA4KgbvezbT7Ygyp5vzcQMTXCFtpDCXFMCx5qU9Lq+i2ubPebzO54KJ+wFbrgo

kAwElAlwc9p8QzjHtb33gG592CKudIbtMEfL0NpHN+DbqPI15WQmgkNAHSD/rV4gYsD5m6vVxTt51LLC

4B28UzZnLNcf6vCP/skJHO3TQ17p5c2MepoTw211r1
+z+egP8ULsh0bMYjqsstV9E/o9F5wJl7YBKVkS82y0cxR1aQZF9LDTsiDks5oBqx58WDAn7zxMgyF4uV

376cnvFYGdJQ8dIuIp4FBmTf31TcD7W8/MoP/A0ajeT7wh6F3XMWHwPpbzRyEABT71lO+KfsBVbbxoAw

fDbAHSiTh1q5qH06clieEIoGPFND0ujUqof6j7J/9h
RHYV3LonSrCb3sog2imugsL4ELeLLLoQsvdNs9mJmaMdPLEhmrxaNtJGsi736SxNj04acYg/2jfkNj50

P9U0tLjJw80RuWrV4lA3vrfc9SBRoLCbYwBm11bOAYFZrbODi0Rj42e2eXiHmnwpgka2YkG5h2a2AtG3

ErVKpIft5ZiR1jMOwqDPvmqjR4JOuQJWa2+4A2dmSk
DZcZG7ouBtY1qZJyDtcZAufEEdi3573SNfEghEDRrfNLEb3j16RCj+vvdXRauOudg1IlrSNTo1F50RDe

MBtcVzUHYzpg6hwljELseBbR7Bvj09lad9wDPs4vUv4irznpXVQxY39fryX/PKayuoNHF5eqK9/QFWXd

wG1SyLcoFiRg0yDFRKQM9fE4yQnKp75xMNtKGDnsv6
UxYF/UKYMfGphOVB926Tw+iGxEZFtsrrnxpbUvP/wdlBslZvPdXslAZxjLlz662IFrdAbO+TmiXd8uBC

wqdJD9AkGT2pxDIprtQn811INHj41W9119q0wGFvCkaLy3bu01u26yK2wldp6C3ackXY4U84NAEAg60D

mQyTrUw7o7G0h8da4nzgi9LQTJh4qb06ICN2/x8qEc
Hg6jORTuFG2ROOIu+1+u4w8rK0BOktD64fTiF88FVEAbk1RMHXJA1Q6m+NH6fgbKUqyaJuUpwEKw2FkW

QBZ/rL2nS2eqGr3VGkX7VqDMJYFLVFbjxACBuH6YwDAO3VC7Xic68L4pL9DznBuV4+IeIC7fxft3vk9i

cvj5zJkDlYR6TvbFZWWpuvVvBflCYoFBmImg/ED5Yb
Cuu1TZkbCcFISBhHdd9QE8BuN6aYLmZRRBjAn3sYAzXsBLUg8ERb5G7GikykjwBiYQnvwZuRuPnPJ0IO

dPEeajYLzaZUicgRb6Gk5RU0HnuTatKxzUafsHLqnAAchQqOx8q9kyBYCGG2ivOVY3s3xRm6QeXtZOJn

HDsM/AGvTmSmkXGzdBuUV8ejL/+Cvp7w9BOZaEVeRo
B/bscyzHyx5Qecr6Oz1w1C2WbgqzafuGthQ6c2iEgsqFPgV2heep44Bn6fmVVSNQv/qHvJAFqLjQbFRW

YMM3QS4AfmMw359LyG98r8//ae7wO3sSIge3fZVP92UxXYpY5kgZciUl9ozcVdzvLkrBfSVlGdvt8vGk

J7MVNCEgs+RCYvsrdvTVi/vB5TuuK0JLMBueBfoJdI
b12M79k+PsMAN5xYOze7kj8fnv3IhZpicAbs1vkMZEiaD1+7QlvOzlzm01kR1e7NeCteBiEGmJks63Nl

lIwa8vXRTcety9NrPaoqfXIrDTTk7xipDylTVwgrIG5bU8wbcyY90t3R3jaFuRXJjLF2O7OcLwCklenn

ebvc8q4tgr7qtCfFuY0FI2G4S3X+5/BuD6ZxQP4sJA
HmsuGBMtqWnmJiWux2eIakcnKMPMVWBLXD6Bly7/bUGvBltqgRUlhVbB7TZIuD3NZRW0yUHci7PJ4BXF

GPuBRkvi2GVbq2TlwsgAjHNjWkDWvKNQ8Yynfy1a4JdvM/W0QDEB7+Lv6qmPW9g6WAW1ORCwQj6m+LR8

+t0RkNXEEphhNNmlypuaSePuH6FCOpgdi4d+nqkC2R
PIEln4/6DOjFSI6QN4ZaWI4jnbfnPm+4oppI8s0FnP0QZ0fh6856EN7a3fwIG0yarm3MBK66DH2cE9Yh

ZibTG6TLu6/EHD3Z9HsiuPYgfrL/OO+f1cB7SvRe0UfjSiWIE10oJ2wFbsBH8jv/LglGGIhI3eMaIQoC

6TFTzyKaNF9l9O0oXhjcI7/FmRgZLl+9TlsyzJgV3O
DmfcMVfcTrt7r9y+x2588z492sUjWcEr+xfhHd6Pz2io86l3erEag1dwf1iqrPuAJbF0gjzQblDun3PJ

qckv3b4pci8D3lltRHwFTWJyL5eUPOZjPsvxZIddv+eSR8gpoVNr+eYCKm6CJjJtxAouCa6DLTLxTt7j

N082ACVkSQgqVY/jZ9cN5WkyvcAnXdY5OwAbe/oHYp
zlV88vG9CQ/IH+IEUdMkGTZIxF50E+7oYA8xV9mT1qskz/c0yeoJaDppkmMUbbnHYReJ23et5W54LTXd

YfDY9r0z2hSV7t3OulMdHE1DUTYBK4v6YorHYmAjKekbRWUdYrRkYYC2oEmvc1s+tBPqUs1JWC+HLuV/

fW+XN/3/t+lF3d4wryw7x2nh5sCl5IbNw6zFcnxLaV
6OMMcvselQRa1ha03345HaHU+/JVjEY9VgZl+wu5YEJtRaqYTuJULNxcKHMFMbKZoTr1iYD/r31nUP+G

sjr3nmemj88OXl7azq3Q+sLU8kq/usxfJNLKBqh5RVfKIPjsb1cwomN6Ep9l7vmp421vtj19onEqJlcZ

yBW16M7ibpD+zfUVsuXDMzXQd/KoCAFmyvimKs8FRB
u9vgci+SC+6h7aEnglkPsf7i69zbtGD2ILUEv+ZitgU+j+OhSfUxkS6TeIX/wjRRamTbrQyHspD1utL6

nl+o1mzT5jntrjqqTb7DXqLQQpSnS+SdQxZ+/w34t7ii+iXX2VUoQzAjBtQlU0QWni6qgiFt0Mo/6wlY

k9ChMhV26oAAjX69x4w93U6a+nUzh4CQHtc1QjCYL8
ce8NzX3MQ1UkuwZsymeBDO14pOxfv48aJGBM3gW4g4vBWwRbG0Kmnul7/tWI0j8dhlrlgj0WNaCZO30W

sAmOdbusGgMH7TPuZ4ddi+eTYEv5KxXHHRWFJHqvT8OACNsiChouWILDocn32x1h9msbm11BXt/Cxxbz

7WRhjTadqDZ9WFwlDbCcvlhdc7VHzLGz7zey2ZGXuZ
/kndP3WHXriHH1LDuqySPJ/wZF+3T5K1zs3GbaNbRzl/WlK17tpM1G13lBxkH77j/dHhu1xbGKym+nWI

6uo9BBhCx2Wr1RwXJGw5Q6oN3KEjRyo52mkJ6A9TX9P8b7H8/uzJuWBZyeIH8gC1vmYl5j3lm7UfYiyk

03BE2oxfoplxEWPydtDN2jHqf3pLs9MYpan/BJyxnR
trVhDjHWHTxDw10gCbDcUA9cjknsQDme1z189dQjYgVwbpQxU+PDyadKCl83JfG3L7df4kMWN15yu+qF

f2M1KkAbGd/kOWFIZLc9TTrxO4e+NlAPDFMUHOH7MtzUmeYrILi3mozJfs/DEytIsjz6OkE/dItxXf2w

dpIwWFhON7kFfw/QcIuhPsHyuyzPXCNgV4s4UPUr4+
JLcEsKz0Wbdp39ptKZ6EsV+zIj8JUEBVr9Zk+ttCv1vWuFPTDsHuKCk0q4DoDK48C00lWvRfZw/NVzpM

oGrUz+gg0SnZIh/YkJ0QUZQIei9Ht9R03uJkjCV4Uo4pVra22ZAjyQnBOfJmcj0qZU1BkqnZ0maAM+lL

WJQiB3E21+jhFq7iiarpSkpSZdoNzffF/+JnKLSSUd
JvKA4Qb934uebZycAR93Jp3Bo1BNpx8LLPqoh0HfFE1qaDW0mymWT4Q/Tj+6FpuP0g4uI9If7Geu65RV

ho2yfmVpdmsuYHsfqrRQ/cpBcWY1EH0C8toEW6Jc0SB+nknSuKI00rB508KIXVBPD6onY/VoigQfc5jN

h3b0dWo48RoUwT8TdcQlTr+/4xu3IJEkccS4q/bv0S
Rujigw3wgfXpW/bV7p/osbxU5OVj+AgsOFccqObGDWnrDbDvhkvG5/g9qr54wqrJd9ozID8XewnWAyz8

DRLos/irZOrHBmL2aEZEsxXh3BHh+8kOaDN+wowN5Eiv3EHZdROPpvTefFYBd6T4g5hIZ3sRazp/XLcP

DnTO/78Qw2EracKX5H83nf8XOyGtd6x/GBZhIdM1il
PGeJE4qTVN8fmJfZzAB6JERuFNrC9Gw/qaem1CAqH2wI8xp4gVfiPldkuQq80EUGaCneucEL/uRCdRGq

eQvkmjl/9DuU8mpI+zxW/ywPf0DmpcsOCyx7dpyIr2LBK40VS9A7/rjx2H85pFZC9gN18HMpLPdUok0g

KynAbOhPaAuxGlYBeBNqayq0RXhbdaLkAr8tMcn+FN
nXOIjfuJstD+Yn0aAtAur+faiPyAMrIaq+Ki16Bs8HtYt2DRClxik8Pz2A4mqlyF62DT46h3WOob3pok

yd3HVYwUGxyjo/HE74v/xBwXMsTGni8iPpbWxGS40jnbFyKpmSMvVMjyI4BixRR9JgRspq2r/oA/jrqM

uqd4x8GOBEMK86AUpcbJZs0aWFj/FpL2De9xfT5omr
t7AQo1Y44IUVZJEiZ9Mz9OQSpXLT501YjCmC+l+jZwJ7zjWSAvRiGc35OwRoFdRaYthMU5efI+TXDzNJ

l/m/nr6mHenfRfE64au3C2/Nfk3OMfUd+pODEg0O6l/CCdotNVeo7cigqbAYFYBTvRk4zGPnUoZIdkrJ

X2rtQY1Aj39ue9p1sUTmVpuACbfQ2cFq0fxyvx9FYI
a1WfJmXj1bOF236amZuazwoCeXP0lJJfuFiXdNYoY6Za3LS6oGJkxBPMfy1bFgLaG69CgSC3zKnmkZom

xm5gP7pe4KY+ISinBI4xkgH81LJaSnSZkHOkyIvLqXipvdAvRiFg90xFfirSnXGudeu/ozVLVpu2woel

utrvY0c04mf+V37GV4ELCln1g8HMBt3Q6M8xqDY5E3
6fIImfhndSY3Hd2yQ1lgIw+bNigX0rmo8Nvd4mReV0YXcTzBY/f+vo//HXi8F3E949mHAd2GtOTIF4yO

TzEyU1pFQsg1M/lPWPpx7tJ2USwHpcKj0mrkgtkxpsu7PO6U/Kilo+1fLm7xHXvGySuESIy79WA6XB3h4r

TAdYxUl5bD5z3aiUnSYdWikOwkNn3hqFq0wtnlmDrh
BibyJKa2+32qeWQvkg9q4t2AjTHc4G97zXfxMlu8Pfb6WgGy7sLIgRtn2SvNRz4REEnuXMbl4fso+oIk

Ft71mUtcnG93so/Nznl2Gaqkq28BXCyxv0ne83O5L3UReXOgaoVALO5LyJWoqd2aPr9pqyAXe3SfH/2B

H2vr7NnN5HMAx16Hw6qe6K1NdBgtXYBINY8WNFpc6m
FwidkFVkd13P0yz8XtZb/eStjeTCAQfj3M1YJiL9YKwD3R1YHhM3X7doPAjhLJtJb+4oON8kjukcZyw3

//0I+YgRh8uVzizjLZcwlSsIme8gOdIk1DH24KRMuVNCl9TayHjIPTYO7yPxPMtmpuaQVoFkfpbclhty

7kvmD5NI06VjQ6ja7ogDBflU8ua5JcJ7SA1ahUe1gR
vQRwPzoX7PwTAH/6NIG6D4qUMqyTFRQWD1LI2NiaN7EQ50aHc0AQpQnth11ERP3u00WXtXED20pRuhYL

sSPghhnnP01p8Wjp79YY+pK89U0PsDf5CuaNTv6GiHR69TpumK/KJKAm8gJejNrqD8GzdDhPHF7bN/i1

SOh+10dAmp/S4YknmAfIM8ZODN5O2j9MJffVGWlx66
EGnuTMGV8TEpwBTIo6fuWDX25JUzErza/whVtJmBhvnhCeppUPuflX7WYL9uTLmRGdXkTIFSD3Cq6yje

5myLF2/yLyLjekDlwe26sRyAzM2T4fqD1SvimW/HmS2MbK3k0YsmKMesU4xP2JCjODmG2GbS4agZ+ngq

pc+AE+9OtQeJdYHDTM8cEFAuT0uvtuQ6JWozZbpbcL
HHrTvaLwt/9ZmwcWT1hlIHdL1WcEPVfQyylJj6nmgMzf3rZP47pnSSctiu05blqqocVwqvdErkZ7MiQ7

Z/SQqwf050Id1vcDy1nEuxXxwqiZogBWy/G0DKHd1rg4vVzAJIza+PTvzRgAslpTJP5F46gCE279bFgd

cc0wfxC9Y34+PahQbl6uG2cBt8p0R69c8rUMF2SKe2
9HfL9v5QDTANQ5vbgqBDIak73CvgfjlQe3GVK/YEEQeLNHRcxf33AZVP+6cpmIDr3z1MDjRY+deaBLrC

fGpi6Tw+xUEJrJw53SB19FKTu97jklIjHqIiJIn/bcC1uoe9aB8Dqag7OnUqLdYXmWGBepNJLBv4S1b7

y8fKZlx2T47ZeK7avjTlDFt/Sf1zMwkNdhhqvggvCu
z9arw9TyaYX/gEF7J5mHgK3TR2fxDc5xm8Fsk/JCA4LCpVJhwF8PrfM8wDq+O8bu8AZhd9ZW4KtLhVvr

Xksd31HDamJhuozAZcVdLD0Kk5TJIRna6rNh9mAOcD8KeUU3Kc1nagWkCx9VRI+8iMuMBsOJ6HWAQnge

gMnm0LEz+uOCokUuRgMKYhP9nzMm80Z+LWzdhVfqS+
mUXP5X7tHwzuCwzPLoe5Us2KiO5NZxi/ZILl7oqyzTstuouatL+MHjNBoNlftEbrpVcj2upQhdVregGg

UkaMjZa02Ex3Yj+hyvitm+GqSJ/v58LpkIV3f9LS85pMufKrlZdGRoQB0egrD+soI29NnbXXtN/pxbcW

H0ujyzirCK2Ql3EDvofuhXbwjuRYlZXkhIhVwa1RiP
W0MogFkFwswQZVP6cMSViVqq/NTFiZOfFRxdMYNWsr3Pf/4WkBSMqM2SRqL+dlz8jrzHH7LthKWrZ5/z

Jfo5CIUy9andK5IM6dZ0DkZAA9WO7pkWV+RcQfZHMfXzL8YiYFwUM15NVA6hCjqOf3HJwDmGmoWusASz

OceOEf3mYnabBxAoLErWD5SjnZKd8lrzwJjVagcUhR
D9+tW9IxrFeXSHqlGzRtiOjWgHHoCCwr2fHUG4u+EfB9IBepbI39Vqo4NsTK4oajnhntVJyCYKDrnN1N

+bRLB81zzxrIiCaC2TNUXM4vsZwXCNlSlYamJqRVEycgAMGXjjyzEkvQG2klgKBvzLPs9WxjfnAkbW1b

8xOiZTxSb4HFPeDCkUJBf1Em/00jGbNRszf+DJMezp
T4g80jrMWexUYHdhd0+hrP9OaC75RHBr4bOOB4pdN2X6D7JIxYkC9yDllI3cey0yImU0IXjo000Xw/bJ

pNoO/myWVynyv5zWBLvjmYIsVWFvmfTHN56jxJQSgyU1/4iRpl1/qEBALZwHP9jiK3POcFZithEI7G0R

Z8gGiN0o7pe+Ojw3tTSEZpnPT7/rvWMxTZqTLD4etp
kdkjIaceMe4YWIm7TkmmpbP1V0djmJTltP9H8BLf9w+lpliHK0aMscpG8ZjYUnjexfQ+vTih9INAzsha

ut65CfaDGcSk8oqB0ghrIaXWu9/rbaHTRLdr6d5u7LGpna/LdHRftZQ85xhYKmKAmvSaIi+aDVlxMGp5

86TmCIwcpdMum6EF76ROwE0aMrtNN+KTsqc2GBBTOF
nvdqmV0r/DBa5u1t7FTgwJGEGhqoefmfkn7aLSDI1YUUOBaXsGALjocRgVex8aNZdu+302yTUsYRuyxI

ySF7EMMAU7cLm5srQZ3tm7I3YEcWqQ4iR3iwOEdbi2cpXgwRFZIT8hivgjp2EOgUDrSZsfVIKRSkPZWY

mNf+AkxJcf4z+xfQdfnQp6+httOBffGXXiFcrYTLd+
jNes2YilPSYFmUXwsw3EFqTzJ+QnD+79aBWSsN92xMfqhj+wI9b4Dn132j4Cge85h5KK4UXqleTnZsPv

844jvnAQCipxQHd3Zo/9+YUgrhV+asKEaGyUrZDFtJC4KKR9gQGGh7lIx7mx8b3fVuVr4Ao61hNmmrkk

A7ftgC/QN+6B/vbMDbfZZqGDeTNxoHaZGHcCrBJ7QH
HFe++xdCrdhNo9nKLY6qR3Pf7BP0OU/20uSKOoCO5HYZHe4xe7Nb7aDIUNMQewP+Z2NgbKqgG7x/0cHR

CD7p6E/MLO8Z/vphkyDyitOZDs1OU2K6cxZuG77q1WP/vDPbpWwy4h8ks8lJSLaJwNtfa2eZPFXM0ljO

aq7RRekzC/ZoqrlItfFAuvAr5H3zVwPIgcwZBTbIxi
KbsUM7WsSRVzfCNdqeM3VielPH8hCcwO8aauzU6qaAyl5gESufZRtyH0eX+1+Q0WfqxT/JUodN8+ieHI

+q2MeR4xil/kYyBtyntWjaurojUfHjS/2Dv8F2wExIX0NeRofeP++rE1iLZKYrCq31oq1PEgn/9ES4tw

krxrK0uyVACwGZ9hvX72seTNSVwQg62L2LQ7+YFsmG
vjT3mV2ZYhJn9YS9HQT7eN7RBmghyxORoKiq+RMMXe+BtYIrm/mKrxC+Q/qoSYkyIq+XktUWCXEWBznb

Hk47xZXmLgnRoRwIk9gZHTzHZR2vTrdU7q9cUg+tOkVUYR9AbVyGm0m84ExBSUf/5i5d38xSwraE+Rdn

I1aPJq0CPk9pEZCFjZ8a4KE/gJL/nMhBGmOLGHpXYX
unphrT4B1d1OqWLlMVRTcbpT6XakJtrKy5pvixlOR+THsmPYjy8hAcK10D16I5vcPu8YT+BmCEp7PqYS

LXNJ5z3TmdQ/1Fvv42U9aQ67HwWnQix4wTgQVSjcittKdmYYFCfAY7vxCFk6HKEB+gJs/asN4tNl+682

LSMxt0/Me3aX+zPAdPtS7SKFmMotAd/AHLcQM6vAq2
rPRDwUJlOpNYTnAWZM8xsPCQCoqT604JuuciHf7te6USiCggv835K3FAEgqKISW2aV7Ayk65xnFbeiHy

9i0pyMCmt9s+B6BbnfLtSp5y5z0dgiF5miq93OL68RXaupYntNSF94hxIUiPH2RE0ILnngr7L74xfqlu

xnyBv2G7B3TlEobLDlfgrWh2BGHszPq7UMLnQt9O3T
HkpiSFhTkYaJSgUcYetGA2jb0oLYYKm2AumAG1hFmxWl6o5/AvfQDmWY7ZZkPYMj4S2BwhTrvn00HTGk

w7lzBd/TcYBryHorT5DLU3qSJYXrUXObBdZcFCDa+4d5Wbzy6jSOhH4N3fUTOgRucT140tJsBmPBKjoH

gZTTzM+vPg8GBNyBHB3OXyQClAuwM+Dxg1/hMgoPrq
cX6hCKaBqL62OoHBViXC6mscCTn2BK/vM2piEAPP/dH1xlmOXZXV7iGevHaddo+3O7YauSmUzeMZabP7

NsS2U/0lEP8R4yak9NHPDDLZPAFUYaXf/K8E9LOHPj009ZYmbxbZi2Ju3J379DH0TDiJ1Y+zGnlTtTaG

4i6Zc8nB8IJcjvIaqtAWyWbzZtca30K5SptHtpNn+r
zOun8+nfuCg63LU3ZxJpjiovaNy+rgavoBatpbqflRg56cNP++9unpzcDHtarZj9woeg/hq/47NEcTh7

oZh380R4ttxhqYJekHQ+joX+ys+s88plnVA35PLqG21PhWHyfSp7wcd9f19AO1hnGhBy7Nr4Wl021f4U

rezi700IS/UkHy6UOIe7m8f2BBeGPz43UO8mr/L+G+
54dMKC5hIYcfl9OfVDEgaRJ1AGIkKz2sgxTr90M36uPQtTfP0JzbHI/+rdi+84egiQwvJ0KgXqlnKN+p

Akanksha+6f/q/oD5kBr01sJw1G7DghFLufJX5WfytYo1SRzjRsjYd/IhpodhnzvMywgkGDkuDA5gBQeIAK+0

UIRvBQapwEBxFrVuKN9wwc3My2kwsHRI6IzTgtgAGF
vWNLcqHQrQ6UOdz1IN8WLIqaZfRe3YFS43W7kP0eU62ziMYUIfk5RM1c9HdHoR8VGDEV4OrmcElDGj5k

rhVoQB9EnP7Ld1XWALsRUjVm0cCXzfBQLWKULZfD9oUlYNAw0FrMwXPprl+bQbW1nk8Qz7URTAje63Zt

Y2VCgfOkzh9cgi3ZWbv6tF5hvBLeaqczjNYnZIE+Ax
l6wVCjtEfnzKTxWunfovjwg3KNi6/td33+Yt8BZuYLW/JhUhQl4726wYU4HpAjQrVqeyjjS/eWd45Vnn

XS4ue+ivu5T5rszKPjRepT1V9a2WTEHLQcolgiv1wBwG040b1e61zeM4ySweFVq/TKIpsiZB+jjRLM1y

ir6cPEOGhteG8eekpSbkHPHS9GS2h1vY2EZlHdpl0+
i/nWmkrucVL2xE0PexWVCWAGlx8sY8Gmc43sweqr5/4Y00gEzPqjDkXIimt43DzMZAlkMzhplQ1iLgK1

CwVVagiOV6OXRta6NbJL7XmFvl3MUy3WFAhBFYLQqSyPCqYVlm7n4+aK+J/t9QgeS8BF1djKt4miT3BY

dqFAS4o6yl2IVg8RyMyf2uydKoPYZCcLanxOZy29Ku
I8ZMprx5Frhz1XirSmiPKbUz02UTx6jt1frBjW0bZwAKGat3UQ7cq0fMXaAphzkjCkNPFI/fQBHYMccD

qnj4h8byhPk67a5oVsaubbt8YhnmlOs0RQa6HgzQeqrsLShlTEnnMQjjGEAbb06/Mt+JP4G9Xgd6mgVU

HF40Jgd1HS2XydwOkSREcTuHXG1XGETLtfaFCcaa8G
s4VrZ2XNCbuYxwzGoW56cVoFXDlUmNklH48yUGFw7B3fFMmmHwa9vnGOB82bVbxxbpKG6eOgAZyTXjIA

+AnIO3CRgDVlDNO/fGcXQ4q9G4qclA/YrcqyKuVskHJ7p2YejUH/g0vB1s3zp8AqG/7MYPTejUOQSy5y

oqxbpaysQVoXT7p3jxQ4/HyEqdw+z2/AwI45dJLpJm
VO4kqXg/RxlC9mx9ywhB4s2pYnE/J3iEol4DpvpmfoOeJomySh3yo+RmSj6JnQ5hDoC5+aDffy9h/eG8

quumX3Z9EzaBppJp/FMMrj6hjhjEBBANaj8Y/8ksP9PRtZo4IHt2nlX43csugiLsHYS5vH/mf3SEqg2J

PQDAtAtmwe58m+ve4N7npL9IWupUNoaM4KWLqy8VKp
/0IvO4mK9tQcQyuBzVXpc3D3NZGn0vaPJSh7MN7/azzsdpqtyMLTDkws5nVigiGZutP8YHpwM+7WV65r

fvpEtBWaO5sIsmyGBENOx6WWfh28AD6IWz2F3e0WgnhDUVn30l5HMQBSmMetNsMlbqej9hmGaxVxPcJ8

wM7UbY6jCSKpUWq83V0A9NFaWX4AFFMBHTK/isEBdU
/xmIEKg9ZR+ixJjstCW6SnWT1VKwxg7vnHp+PgRJ8cNNT4SohwisijIBGCEMNStK15X56OwntoeWCKvz

THRiVhAh8TrT/ZEvsdgocgG8xaA0ZETtx24jyvGDF04axjgrmjC4wOPd6XWHfgFu6T8q2Yc14recKYeU

IJKPJNelOEBOuAvZKV/fguRTD0ryG2mj5Qo/DBQzAf
bpp2e9hWB9pYtSuRuSBOUe6OTVZKexp2cX+NdWCnpKy/J1mzEDAiOFRJ8dVouATXkcLtQYnD87shkQd2

o2URlIr4LPC5JS32F6fJCCINk2uAovAFDbwcoVXDu3I84vag5VB6DZe1MJzXEeslu5O1N2j+SNEcqfqD

9r8YqxbqJSVyrDAO82pL3tISbhje7vyKbXF9+iJM7F
v+8reLyY39yAl8STs28PiH65cY3dOqRTtLf27xCPsgexP6UzEheoyQyUjKzKhLdZDjaeHpepWTYPZi4f

Tuw2efiXfiHipMY4WunTRuScFg+39Bup7NnBMk9P4X8BM18QYplecXnD95COIbODZCRgwvEmwX1rY+ud

blfirt2+7bZmktRADeX995E+/LWIyJdwUOvNBBPyd8
SzUqWIFBj37HPiy7X3dEVXdM53K72pE8E1fEJ5lnbe+uKJt+2ohVwxTeIyqEGRIuOkzcX6MTAu+WZsFK

TKMzZ4RVYQP3egSNh6j5LHy4cOxdZcz9Ays1pzqjG8RqTbzogC6Cs4ZK7WTf9+rdaQSX67mlyy1zC5gY

Of4SpL8AHJGMaTrcw6kM/fVZsNV6Lgm0pVg4UZsRMo
tCjieVY2GQveayaZrufmj+OVOpX2mIlMQSawl9mjSTk8xCYFsAVmqV8iZ+TzTpqrSpWdA/Btt64hVgxE

6I2e5F/5LDCHtP0Oyvi9TlpiS6Qto7NYQ6MkuHAWxo+podJy2FSv7LgiCfG4qvf7Gpz+c8rHJTSAqtoo

91/XWRMFnmBigu0uWvE7xYE2Vxs96UYgXAVxHfJiXe
ldxaFvvPntrR/YojLh3zB2b4xTVI14OBixqmFf0yzPpcuKuoijnojC0R8v6ahKh64Uvk844aHGKAN7C2

zs3ZDcr9C3OP0LeHNHQwsPg+BNUVrV+mdW2YG9NE30wFcok7xitj4ykHzjrYO1XrlbPqH/Y4MMScEKdn

W6TrHE/4DngVOO2pLyZsTj3op7tOeFTXg6FzojxkUl
QMQaKevXIi/zIYx+vflsjPWfh8MFTnOC11g7F2JIEtvYX9/ycIZEhc+RWkTkCCB35H/9Lat2BoFrjUTH

DiynL3W2Ez5/R+n0FlqRk3rlGCwFHnoJBO8aSiCICNzm9gByIuBR1bt38t+QwbPrAO4K+P9HYFJiCjDG

0I3kXnZcJ68VSjiM03oWkK2LcfS05kgIVAg/ANo11J
r9Jue0q6uhwlnIvnBf9HonVG+vx0gBFPf2I//wR7DnICIXTlNnTein/dLoUpyM06OWGAqXgcDCzIBBSL

N8klrh//0GcRKBWJUqzFn4XuLd4JtF0KcqWNQgRfHri4N2tcyz4FTOxCUaCOJyk7UsfpfmgUIq7X/fVy

rWjwuLDh9J0xleKq5o/BK2k3a5LrthDWoNKmD3+N9h
Fgb/qVFQkeSEeyV7br36i8WS0d0MyO3hxf9QVxJtxL86wsvHX+sdTN1W/zxCapWObf4wcV/7gzrrHB4X

Hcab+jSE8HsdoGUnh6UXcUjlMcpoVsSUXrTXMbGr0i2U1zlp3sPjMlkNr0L5hd27Ua56tpZJwzvgIddv

RZq+zLzSecHCPTVa/xUb1QRSABtwsjNx6Lakic380J
lswdONfYzreIA3xwD41BPO6/qiqPjSsYGrd/NlFdQRi2+1bulFImTwwFzzK86s6InH2979sfIcL3TImW

jgN7qWnH8Yhz5a14TBH/eCaOPwPvzp8w1ps0haQgwnwvGdPBa8Gc0SgxozOfJhUtyE0oZ4xwch8Almax

0nyFbXFHmLUBOZPk0d66x0fVMKtkIlCyP3ykxEUaoh
txow0WzgapCj3FsDPBD7EoLqnAJPJr5aKlqWIvxQI/qZj7FS38JA9hTUkMsHS6fdW0/xfQ2Rh2w6Tlp9

89r4F6Ej5Yd+ZodpMOu0teX0ZQ8L9EcnOeubObrlsZtBCyvHlNKFWxcA1ywwI+bSdkzRvHX0QM9LquWs

x9Ky6M+Xaf0IwTkR42TearYqc0SytMkRzm5HwB9RwN
KfbU4Bx/uBayEdrQ0kD0ryPEfPgKDXfq6zpiM+/oHsk4KjTA0Bu6onlqqe0740c6xBaqwCDnuq7Pvt1D

FzC6Ovs9Qnt1daD72QsM0PirrsV/KoDjd5ln88buA8qxCy8gozhYErcm+doshMDTSJT4tjXVUJrRhYOE

ZaLbuia0xy3JWOYNJexzxJZvaqTns33m+Jnm3pmeSF
f+DS9B3mO1Kp4+hbSs1A4FOIFQCEuPJ446Pvg1xU2fiWxIMZGd8QxSgY0EKD0W+HxCBIrjqgYdEJ7t31

GQMCnDo0MO+8vVdw9v5LMmJ6Wz48akzf3seeT3RXyHattYqmSK46Rydipt0+GgsMk+T630SONvtv+OEs

/9eumRlOOL6GxhEm2RV74Nrcy3Qxdw0luN/iA4aUdZ
VICygOGj8un19Yzgr5M+r5y2uGqixNHS92WXAFaVRbmG7gzuTi75Gzmfq/8WfTp7+taa3zL1Z5C2hKQz

6zZIdG+DYxZtArUitnSkDiav/QK9nkRjm5NKggYW91rVn+CnV2wbefTIKcRHW347sromV6tycJuaMH6s

cyEPTRNHD56+PjEU2bDYwTFaZ6Z7o/l9bk35lSf5u5
4nqh+vHmjZiukbuTOr5Pj4ugFF2SHZx6YVgnDPchbOvoSwdYiWTW+08RBiboiVdZVHt6lc/joVNqL4Az

EHKbpR/7o4yX5P5lhfOcp4dzs+ZBvrMk+bYAawgnIl99nzRWujw/+LF/fmH/KjjbBf9WiJQhZhP4GKFX

PcdT+m9Aj3+YhyBFPMcmW07fpqqBdlkqPySLRDaVAV
kzfmycYh30oac/FLhNm9WivKgIG2kD+GM5jA5Hctv49dncRZN1bhUPLltjJxXvPVeGYDsMtZ5Uf0F0qE

D5f5rmvOrjU5paExlYtwPwoRB8d/rPeUwujHsS+QbUGOaxwK5+4jRvhYjfhdAYwHUqa7qS0uN14DfZ2L

gZDWkeGcbAXHyl44DWnXEwqRPEzny3VTrqIblCL52w
8V/vGO32Y/QrCnl7qmw/BEo3sOQ+A7/8b0FH8c1p4xixI1pl1UtAtVw5dws3V6VbUM4FBrG/VzERE9XF

lVs9dkINsqzqshKn294DYisKxwAhzYuRhLY7hfsXTxzLn67XogyUfFY+aYjq+GH7nrnuNJHeWKxkGJRs

Hz81GwC7o5DbO+APO5WpcnAWPS4cd927bTnTGi3QPr
7VRmAVSfi4AvtgDKmwBMLQnXGsTtcvIrBP+02ehYcV6YtsTm90lbAwNq6IYVLyASAGk6VORDt50Kg+Vh

x3Zul6x+RsB3r59qQFcd9hlXUtL6/pc+DYe5Vtq/r2rEddGUawzt1g2UTo/K2dk6F2oRoMAnR7uooWO0

U/N15B+AYQICYn3eMpt3G0fyR59ZkHqBXbJTHf+brq
sKjJG1e4NhIN34h/6AmiiE2VksoOfLgrTBC6sx5+nTH0HBuIeRERRn2JftTtQSmZglo9BP2DIKxu6wNE

3DB6tqKSqjzwrHV3M7oQwIgCUPCgDyLA+sByTVnShk4Nqr1W4YapdG2rFkAFOpzqa7Llz9caYz+/3tU0

UZc6ku6oIwPDgLB+s2mQs8t8BWsVpeNbI9zKFmvCTg
Cn306xowVoCykBAXoTGf+P5u2bYN2JS62j7CgLvLczkVWdjheEbHW0RSLoa4w8N3qqwe+d+et05Spr5h

PEj/WecvX1XctCtRZ0wasLI+b572Dy95qoCuV6L7niMKI1V+VBfzDBQvOck9M0QzFM1ccnCD+s+N2Pmv

OmVVnKHHB8AamET/sqwLAXE5FR2p/VnGY1Yt0upYF0
G7CRHruZmaf7YldRbCnQbMUvuPnRq6Qa9FCm/j7JAGDY3DBNQwtcG0usS8WPD6BoMi1MCapVxlLnOmkO

/rhleXsHe4wOb9DLKoNas1/aSS5VWrLocLzCX8bBdi7m2vaSzwloVN2dinpYEx99MAkoRZVbRuWPPQ1v

7VtZaMWn4FCPG9b38EQTbG8pugr0uB1248zF6u9uQM
koIhOmO6XNgaDo6kBKVZE2RJs/GP/cp+sR1FNyxgOjKJ3hvjIxxNRz39hXnleqEMt/5ACy0HIAd4CXK6

i+5v08GnAF9dqhCnDA7nJ1JC/2QBFm5HqlIGFPhP02BM1WiGjRW+hwFeNEb4+TVO/r1twLjC1kBhtoRR

DeL+izDM9aeweHasaJLGeW1g0pC2lJB8Oilnrtfhiy
wN0iG20bKly7jtTZKrULIznu7AN40D9iHomrxja50bxZSLFbuD/njXecU3r0Jr1F3tRtHlnm11KglaRB

iW/mKQVsX4h0vuwzdmzY560FiSpS4ul6n6EpNZLJ3qP40WTZm2aMoxNbFv5zmGtb/tbi9cneEA454+AA

KO7cHsg4cRZOfRAt0SIxtAYI/tu6K4U8r50uJzMt/Q
OoPyyrpeCfJofxn6V1qyRyK9TeH4lS38mF69mR+gH8ZEVguRdj9UzyADPJ8oRRR2V47P4f+d+cHrMJPk

Uil9nJBZACxbNX8GG4bEhaLhprfAHc1DB7inBa4tl8VB7MArAMoxr5X+JyPzOcmMC7R81jFOrNitrzhL

G8FwZNs2AJ4y99PGlXEseFVk84oGregkp6FwJEtG2w
pDfBmygjdvNGY/vHLuh8S6LvrFZoNG04sWi2TveK/pi1da+Fpnm6hNMB3HSaL1JKkd1k3qxDIqtzynqX

U8UOkN9GeT7yC5vz+IMrp2+6qyKkm6Q+70P2ZkUjca2AV/g0Sf9JYzZxdZsRPyiJ08dJn5tDBxl05XXv

qOb6xpwfx4paCnST5Y9Wf69aBBsvfo3VNbZRjc0rGJ
U0GewMFFr/Hxs8k2BO7PWFdlL0aG4OeTbyOcK7bY/cCIERKxqCnV2gitdxet9kEYSwz81A65uTns8Stf

StWuWb2xCLX9BmBXEhZ+CyiFe7hfYY2s6rg/PovxxEmohB7PYq9sF9c48KtQaKuMbbsRCM0beXDiwrzZ

WjwnZFnzxrpeLd7GowK5XQJQmli9r5jLp3lS2jkfK7
EeNPocqu1ZxSZvND7FvL8LGbqMBqGnQeHWTUK7cH5WskXFy0X50vJM0fTZGQsKKRCcD9N597IOGPbbMA

PK7VBO5hiGrSAfrHcKh1fYinUmbmCnUA3HbWpVjbx+Qlci7TBgLryCpvohNrEd9a4ky2yBFB3gbstDxJ

RBxhe+aKUB2oyiUJjd0yNnL0ntsJrQ4UOF5CuXZyzs
ZDbL4lDNSacVlngNvIgf2KOyi63nZi0FqhIQyEBa3vLYpf/j4qUzsD8QM7G1GDLPfsH7v5U4IgPpZ1sc

iKlC1ofAMP0cyXb/HH1VL899Jyb7wO7c2zGbf4VRRpQ+XbyPdHe60vn/hw1kXX9v4CRXk494Pgeog9a7

VuvbKTQAIVMtJGTUr7LJ3QZpH41hP2hH5jFI0WDVFs
vtwYqdGd4F6gV1zKddF9LKPJVvluoI9b2gUksNXcC0OIqm0gdtxtVxz7LKJpjln2E00onvMng4kFCLD3

V8FXGbCgHQmwYhduNKn/HRdZCXgPWwW7+siNoawe7K4EiTSVFCQrfK5Sr08xu5fHfbCsUK8p5NJcETS/

S9sOG4IrxqxnRADrqCPT+HXkEfGY69+rS8schwD7AV
4kh2b+JTUg43tCesUk41k0HopLzzafmxo+w9tT86p+F8vnocpHo059bBz6K/6tztGpv76PntSC5wHL7l

mvR0C+1WQ1DQqOQD5BZsgerx788X32DXOQY8tJexjlyinylAu+EkuVdcRbTt5MdmGO7pJZklq2mQy+eG

756Yg/SZUEdQbYKbUFL5XvCcdEfGQMuTguCodIa2Un
Z5Dssorr03vWkeVFijtfM2lJUNy34Gm/8kdC/SCjpWBbwLdI7kDh6qX60+n086eMDdhBSXrLNIWc051u

4whmeHAgWnQCyHGv4v2X+sxEPEiEha4C+DGdy6Rav0VhUnqVO/OundmQLwKf0VVYx5udn7w5FpYgzq64

mDONr5FmMYT+e6Bb1CFXaOvPHWcAbiC4dQYNCU0WfE
Znll3wcHcJ+p2InJ5Y2MDYtWVixLH75/yrDv65G3fZHYuZF5b1Ct+a+/5BOvlLC1n9GfmWOQyOqSFGSl

ro6WBia3bXZ6xF0dK14HpFlKc0jieuG3chSAW1kJmSWdBgs3v08ocyq9K3R5iWtg1pplCl71fi8+52UY

tGgelk7/M1gD5uBpDQM+dwnKcgrrEM3Ln9snViwUM6
ttYva095P9bQtSOub2KfCXtlVe6LmD1Sc9nr9RLjRMzUx4PRUQLQ5pwCST9bra/iNJIwCP25sHe7xIXw

wfksvzRjRRi3qtyjAWfQjC998yX/URfKHlID1+owM0EtIRPHm5NHYhKAr+Hfz3rho8YLh5CsYRZWw8bW

YXhltEJi6iVENjGDj/dJZacaSbZ1OgmjpjBw1zq7Gh
C/PctuR0QmFTRtGGg9Tgk1s6pvTUiSVoe9CHJMsoY0XVyfc3gav9GSD3HqH+HDjRMdoC55XHQ0L9VDT5

mcXIRdSS8y9H7abGRE7a93TWSQWdulK3Y/6TbpjiAAbEGf8nrslnxlpobyPMBIlrXaF97POvyu6+MV5V

Z3JSqvxILuR9saHXtkHosMg6ktug8ANfnGNdXpJ458
95O0WGVLbjzZtFnOmf9mmYxEQ8Nq6LbXtTGsn8y33eph0l8qKIOwYMgCNvRMqtQEpymUq0FWdxC430/O

A/WMMAd6coWoU+81+fOMcrdz8x9SLE9TlRviVoMxfMSowbemCzEvd1fYgD5Bf+aHxuYr9XD0qY1h6wOy

82LGQP8eTulNAtQEV5bEdL3Fc93otz+ekYvc2vCp08
8rW0HG5a6TB2IH0k5frvuLg8mnOaJj6dJUUADtktS0KHiWiScooRD2T49TqX74LcL6k1hpPX5+uCI6m0

VJF4A6r+xjY5+wzDcKHvdWEtF7ZRvWYzlagtLnDAcTglbScNlzFiHZcMy8SMqoBD7gavhEYttX2Ke9Id

4srCE8TaRtKxBim/osZaJw2DCsc1ev+QroXny6jOlt
5+UXoVfaJf6nn9qon5X2OBeknEAJ/HQ4NNC63tNsRMB4xW6m7+hD6g7jEnCneVf/LTNxkBAvYCAu3kkN

kRsky0HLRZyozDHT3QAw8owGPm7YGFw63Ismp5fGA/347WFzaMn0zbuVvwGIWh977FRL+i2XEnF7+np8

wZ824U2INhkebl3D/3PGw+bkScCMoPbNKMTbNoMEK+
n3GXfOV2ix9MJAE0iPHxOmkZC/DLY9uLA5G6RxB1FE3uXSwEHfjMBPN/SUJQDV3iRwF6yKweysrD4Pr7

OYTQiL8bHsd8LCeyQU0ibEV7GCfxLkEyskyT6m66N6qaF+Mm7qxmEAljUQMBJoV+uHs+05CZiNpzzosA

YAjjOOloPTiZXsQN2Wew1n0MxhfiMRzER0r43JgcbX
95swSi6uOypkODk6owLK2sw/TCjsCe0BqaQMsLuOQRBalWbJ3pX9WQ36oj+uxawDSjzQhSKpaerUDQzI

+WEO7YrSXOZnFne3hJKHOcFYys/CKKIN43LoAMPe84G8bGG8widI3ad/Dh4mYFDeQ/yeKMgthCiXt8zy

+s0eCNxwj0NHCmvycSlhEerlARvJOQA96SkVvnIGLP
rYne2wC+FxvLQLz3XUPZX6FYl6x+bYOAL2Lgc8MCfMK3wfcnb9V8Zo4cc7CbW+cf3g31GP2gDAkTJVFV

FFukPUktbfXrqvvqVj4tQQGCCwWiAWHOxY5SOSfqf8WGcPrkbsexAnM9ARKxzm8KwUd4v5iJAFB03cSy

mdFroFe9vfYv+pWY59BK06jf1gaji2p3y8cvSkHSXq
ayAhD78i/ErUKUqml5vKNbh9Dt2klu5CDWZejc4x3QtkBNlR1ZW8f6T9VM6XElPtTg3AE9dMZkhkXfji

6A/hTz9JLKgSv/NhXrJjOrhQa4qj+w3WE8V8KepHCP1sy9S5dqy6ooOxkocoaS0DMMCnEdXxILlQT8N/

kNg1ptXprpNmLGGcZw+RkyB4123B2u+rJbf7fVxxpP
4dFmghdpB2bXfmGboFZmsM9NNtJBdd12D9Up59rvguaHG+fGPu/CufN1zkOmVkhInBq2P13xEqZ1t4xb

WRNdF/qWgxrG7y4Vi7u6cvjwf9KYedPSYVQVoo5zsL7LpyIvB+RPJ9t58KbpPLF4NqFID6+NPHZfzLEq

ECqO3Z2PYc95hDE1I/0aH+VtLeWLAFxPzhU5dAXPRB
6mz2jAwyrsML9ZBja9E2uInqI/YuNgl4s466lcV5c1vCiSKANSVVBrcKQK0ou4YZ8IAIwqQD2LxKBMGB

ugZuLjvmh5a/M7ljfYiYe+pd3I7qM1xi9Y/LHT6eN5JoxeI7kKD9ZDuu7kS/pRI61Zp5XGju+3FQnBct

zjV/p7orJmmId/yHxk+pkbShw1T9gqlrrPZQi0iV+6
UAgQEspS0L1xotOUYjz95R7+FB4NhHaPpSe6QFdyrgVi0Y19GfH3FLHolk45YlYSXsLqeFeWahdKiErH

c/OakOnmTDODsu0Zd1uy+k+TdfIeNiQlEAvaagubGzKH0CjGV1/7o51IcC6IxyMRulZSXJ14bb0cZvgT

ve3mgyk3iMMxlBsVEdg43ujIL4+xsgiCPtWEU2jYbm
hqlAyFWRstHPYVfOEu8YskR7+s6VduO1aDZw72zzhsWJ4x/AXmZ1FCD7YTrTg3miQvtUGy2jp0Z8b/fm

LnGwExA/wHQREtvUpNr0idLXd0VlQ4SuCEw2QE1kBTJ1H3yI+VWRNurd0DgPiLX+RGm6vcMNW2YUddkl

HVxIwm2He6+UMVsTzOKbRQwfVLG03/qtNLXHyf1W5g
WJh3cVQAxrxG/r8VeuXmDK1Jxqsa4tQROrhheyJcYO7oD1EhgH6yKkVrjyxdy+NOVZ+sf5bn37zoLLTV

cT1Ju8A571NFnE2S9HcyiPkQVT0h5g0Kt2+JZV+PjmK/VB+35k/jcmaOhyRIWR+ayDEhnibnOWiW+zWu

iKoj160Dj3fLtVoXmXCZicbHfyOfg9+zCsSHbz2dap
jpXAnP1q/p1R6lyn2nJM0ili2Vch+nFtFctNBpOK2X9DgZNzvLNEqm/iPQr3dkcarKukvXfuOymibMYv

9eepg8ikNP3SZ4d4VWx5F2At1r5uWKxu9Lw3kUa36JFh5EbMVv739Z3AfJ5fuCK3puBqB57bhHRrs8S1

+R+DysLdIqt5EbolbmChfr+Eg4CYLHoPzV/+p9TMpZ
lG+XlFVUvaSovnfpkf/Pkm/3nfxzDuT94/ifHIY0uIYLNdi6HalJ2fqBrLlM+8IstjRoQ+xspHz8Mc2y

BPdx6CD9ujSxe/keAewI8aJPbor0p60gpOVJ9e16t9+shXoUMRiFeIZtt739N3oi6TuLE7Z+7ERkuh0/

f6iz7vj7hOFBz4w/JIxxQSqYqlUQmqPy+wVnzYr9el
mCDWGZ2r1NaEwUdNIZljk9Te1QlJQw29GDWxNMndXQ4XqTTh4GapYDrdHKF/ZZhnUAlj+4ZGXj/V+AfL

S2XsRoM11o04wudmOKTmE6aZ1uvh01LGG7/jJzM8dLnbF3iMjqDPGu/2YU95W2GJx6guCnO32ykiwYXI

9+gAY4M8WzsuwxkVuDYhsAidS4lv2GPRsdZzlJJn32
C3N0mP2phwwgWZ7A8BXIVe1icsmYBKzJEUb1GlDs3wkD0jwsS51R6x+g0Azpojd5R6Qd5kXiK1tt3LUS

r8wYceRjK0gOGdpAbRJAXR/iKwzynP9eBNm6Na1gTjD7bVpqBXkeMSemTheLoG0HYOgfyXr92VIWtmL0

8Fw3GFjL3w5E/FYx4z7QOV3Z/4+lq8/yEZyG8qq8U3
bl5CQGmo2NXd97aoJ7F25UJK2aoHQZj2//o8/hueaw2ESqII7biG5hCSUDv4Y7s/Dms1F7uhwf6J8lpw

HEZURZen7+onGPW3ohQ8+dSipNR8yE69n73sMA7GxALShQ68nlfoU/GqyaTmQnROtGQahAW17F86R4rF

Jzcjozacmsn4gtOTNoAghUCXD4TcS2ws595eUUMIm0
M6nZgZ8Fx2DgDTz+/sHu9qpAVLXssV5ghQlBIfBwYvv17wf+At+llkibNmgYe2IDoKM7QCt3T32xS+EO

IA21pOKtNGezBpeeJMFFKjxaFyDVAV+A3k3LQ3UQOd/UvuaMJj476/QsJ4UlQqDxX1mIGzT522gpEsFy

RPE/+B/8D/3t1RNP3dt04LS5t6892m3TZTpWebhA78
SAZbdbUV8isJyQOFCwnrpnKY1m6VCut+UJ+Gkw3Ew7L+bVQFKmNz8ClRZSbvK6cjFPmjxpPbeH26ptJ4

u4xSg/FcKQBfUD/HGdTt980ExZkFGodt8111mbl5RuESlMb2AWBP2cEYgbZLC2IMibIQ3fFEO0KEqKUm

woBo83sUNGRUVoZFRX9ccxB6NW4M4fyROq7scRDc1l
g77xc+r3pAo6aE7xO8inbjBPbKmONsF2vvrT8LhLMbBqGZesc2exsDMXKVTXvSaF01xvleKNVayyWeZB

5x4k3pSHxcmXkIpVVtGDa4wDR7AWt6GL6KCuJmj4WgzTfNhqEQ4DCmoNqbxKdEQNVQ95da5gFtv69gXn

kL5f/htLUmU6Nt7m+/hj7Ln0cc7Ob4FtwHbSYXhdZz
8PrwsyNp9U2jfd8ZY7teZesHfLuq3I22oGi8rAfTyiUJ2JkkWiNLMTFVtBSWU2tKcjMBo6PwUVg3DT08

/Ivvv/SHg+fLGaESMlXcFW28ydul9t3fRfWH0JOwDphOkfr8lYahFfErBhFGk+k1vwd52i5N/vrCMJxB

AY4OqQ3r66JqPg9Krwv4b2zc2/P6wnzUpa+UoIK+q+
jD4RGsF8urh+noK5ixLemTNyRyWqDyBXfwUfHvJ3OKNfoiRSlh3Pnq0+TcI2LkiRB3V9BrRbD8q6CArz

4LSJK5HM6YZnbKSaCP6pPJCOipQHsPD95JxZSoLfB+mji8ERmfPfxOZZyHqi0tJ5DmksqzdPM45PKR8v

OyxQHX71C7sAf1wLTwbcmbihKSDYZ/RwZ2GLsnTiBR
sntZamX3iBnqg9EEuantKGhP4ZJzvkZyHEBLj7+IrPzxvtRe7mM8tlcYS74fdG91jkHLB2Cfj3jJ2gwy

jRJx0qvkWwWoD/EsRy+ai1yYULglOejdwegVE8xlOPxT/UeATq/T/t4AU1t4kvWlSTqeAqJquULAdX3S

mtfZXB5JDezKV/kMpLHwQ+chzWXAezuNVf9vF02oOY
ZAXMigx81AdGrGwImZP7nI6z0PxgHT+mtPv2aoXuuRhdnUl/Um8P5fv/eRsMAYJ6trCFhY7CC0jY7njF

Cc+qMQQkH14LIMSNjkxE752SnC9XNj+urV00tJnxrsuWVF+09xv18NEIvefI7F5cO+ictDjh4GF1xIhC

A1CqqzZ91iYJAZhUlrcWRw6j9HQ6Sg0pWirep7eW9Z
PZhmpv4KVjf13fVBGseP/JaD7xKKc13icgWs/GqVgAeoBPFISeI1N10AYFLhD4XFttY84UCs7KAfziWe

u+rZExOMrhK034C4jmMDR7ciLTEERtmqa8ygAhLtMZjjeoAPI0ykl6aph8APB2/M0dPPpDzs2dbHy0ho

dyJXNAFb6y8mVTHWDEr5osdDhNmwWEygmdJCIXsm99
aFCcwMXnVsZ8wbqvqB17fIRHD8W8e3OhXoU/jXQaGMQCRUaaieJHX8T5mSXQnesBkA3r7xeQ/Qj9DPN8

7L+IwNeFjNk65cPDhU2LUvuH+ikm3DZ6A1rY1Xh8KuEjNdUanC7GeZvqxqtfu2DfnGsmGjzgNNkRiDi+

sGLoS8E4EFntdjYAbfxg57+pPJzhA8pzQTb+U6RKKz
a7u0fqSuDteDZmLTgPZ6IHt6dsoo36a0QQk10DikLDJRHFJqP80twwfAx3a2FwHs6R/257PSPasBp/Hv

k0Tg9Nqn95ZYdGlAhb/hFAbfkwuWLQnza/E8r3K+vvcpwsx06dR7mFY4G+K3c7iSzDEGfocnqwkLrR0t

tsutb7aFT9uM6ixkM3qNYFz8Ij9KrSKO9P1acPyP50
u9883fYcWRjcym10OfmDo5Gqa6ra4T/G6HkNG1Z0A12QRK6eExjhx+8u7wjP2w3G2rSN5IY68x5gsvci

LL54I24HcS3AUHy50Lolyxi22AD6s8fMXGOrMzDIVjC56pQNNG235rer3GkdItPm6zT5evzwDrj0n6zB

kFNSrVvh/+3q4yV65//w7Uf/o4Q4oO2nHCFdVVzw7s
2pLSZE1IyRfwfF4BHgwPof2yKqp3dX6NpFWNPlfTr469zuJiLyWyMj+pDGSDR/6JW392C9VA2IaVevs3

XuGhSgQDuqLVgU0CRC2+SNq+LRraMqR4+doHcSXXopoUigd29mPYCLLR3HZeGM7AsYzDBYucrxr2qQe0

KI9LsH24V8imwx4lb+WyydWT9URlGxoFtdtrD61DIy
7UzCAjiX0GVXru/26AlsVIZAD2eTACsga5MtVFXFRUbBQNuwJ2ZSjXnN7oJhiKPddlP45Pb9LGpEOHpb

BChf6E242OL7eVphFYfX9AI/LVFuanF/v7uQ1+jLgzxKJT1esARMM+hPsNd3U2ork3jfJ4Pc+e/DvbiW

/7+Ua7jxk2voCG5dXWB1jMEq1/GsoJAcZAZK/rYiA6
BftkEHs5vLSWYbkC7Gpqk4J75YP14WhwGjIFHjJWm/86ewa1ctwDAz5pYjxzXL0NwVCsLTmHm9YtBy2e

J3gebAYm+mV3cI9g0SB7kWjdASNy8FjfpXc8uxIQQyJUByHfN1AlbnqtIalwdLe/sPVk3TOGJy8jFISN

mV4N+wQd8wCEA89rBmfdOvTq4HXvkj7KnN9bAZsnjY
WtTrVRAuH9bScESaft9TI897AvTW2p+PgP+J4hFZ8Gp+VylRbBb3ynV3CR/F5qmy04IISbLpMRJplZp5

WbzEWjDZY4GN7gV6jwGqMM4isyRO9UpMltmgYNKTv4b/EFN9i7ShuuXT/6u7abkN+9nWhYdgCUGCuwSX

vXA8AXaTTtDNPrjK6TQ6wwTDXMfNUHI0E7b2HC5LVg
/AAC/A968avtgydpyd+979Rq09/m5F2Znr4gL4Fq4Of+9d5bqyPb65cRfPbPQz6gNE++LlrnLdvh5wc7

5TgU0BRjLx9GKBZKcHJvOv5LdcqLjLHLFiIlObSnrnkrLcUSj7N3FVgGO35mfGI41Uogdl4TBi4+/sB9

weFTfX8e1prayaaR7nwzuRtluFvuvGc6i5EOlzbTvf
f7uXeEjVlOgjekBop2mRCalcVJdgM6wi5hAKemMij5Ofo0vXoUPVgVmSUlj2JIuq07mZmhIm/f4gN5oI

XcFZh1JFzg5j4NQqszNIA6EFHmO6O3Mf2jl142IDaNxrG1kSiMLh8/6cCF7/PhFq/gxbqG1OtnFKp//l

jWkCpMj/NBx/Dh1lXeS6ll/SMxvV/5L+vwP+v+t3Wm
S5BGui2QNTL9MD7hS33bY1wu7NHgzuL3jcj84eNcwREEn/GLWIBLe8jF4+YR0py6TiNaoXZf0SH7Edbi

QFB+hqDYSj+BesNVO+sP6jCbO5QRY/+YibK0hmYdJ7u7jxC1F9o5Mr2X1vPytZnlKg3hTJP3Pkt2Ovtu

z28/NiuAcsWtPVhJCHxCzPbJmexfmRIh/5NT4A2+8/
SlnxkTK9cJ4U73gTpEwjrYfiXdhBufA9ZiYGH9GhdvNp0qf1tvy7zWibloNwIs9rI4Be98yxHKsGkAUk

+8/u+ZEAAkMehATDmlVki87NrxyX91j5m40PwKjpF9cpiio4fBcUn582iZw39jO2jXd7Lx00MR0O1C+b

fo/gxz+gc3fmIkB9dqbc2bQoTb/U+Q1w7tdMvu0I6Q
fX3BIT2t5+GnOSTCzqP+A7/mBdzx/UovXyLiwPcw7C2a1py3/gsYtW3szF7MyxcPVg8Qouh7PFiKUIh6

p1rN8JJvBwzpnR7KGX5sfkKFd+hJRkc5DmNul/Z+Ofjbck/0uqDwTXW4i0o33K1ia1yL2fehHStFYfxL

pvWQ0ve6Pr87NITMOh1Ib/W9Lm+nWbdS4R71npGoyv
jA74pOe3vDML3s/CXDN/9plI9pnGe+mSgdkmg78S+jtQx4TgKIAafRGzSrA5IIWfLsmdBHG7Nghmz7zq

ukgpYOnwJ3bo5e1SZJuwijst4yNV0r1XYEfOfaA8nVccpjMHCYYZolzALLQS4+AP4I3/hBSN1h8oc7/m

/LY9jJDQtTa/Ao/2H1ZGa6dCya9URs/oy1R0HLSh/w
/qIzn/UM/9h/qGZYU+spMT/wgcxHZtSTglVb4T3ThMKERhOJwlmh4+BcZBgw/m6L5tw6md5tuj8ydvlb

XzHzIn+hq+ULZQ0hDSvsXf+LvF7g+DMk3EMWdw4f6me6QM/+Ba+YTV6dDn2trBoPj5m6T66bHuRd1F6S

bQcIBRy8iiyvMzwwKqQzKEGURKkLISz2ZGK/AcQ3/1
z4R3W9TXk6HdHqZFhrI/yccW81ABc+XGS+CPhYi+Ut4fK0TOY13eQAHa5/z3bLiXKLa/dR//pf4v9X+p

/0v9/8Fa1u0G5dddq9V7BRBPpXrDtNR8kbFl/+8nxLSd3kNfH+OV3TgYcuX94/C9UEIMPxcujZcrv3Ud

Zdg14tZxu8w9EXbiWyit3vHHjUsfCK2UZa8/Q15PhW
2TG8s9yq2kN+woXafWUWCwQImGqECSRDBW4Nxwr/XUIMRbraYOCjj0IkdYnU60bI7OfDSYF+3gIqyzh7

B/hQEbW3fK4EpnG8yrByaaPi/nO5ohehHGrg1RO9U7tvBW4s4FlC7npwmGmvXNsHYYYQrrHVeT84TUa1

B6f4LeuIEOvzZLkt07ORNKVUZtzCLQ8lLqV37HWDwh
Fyzs/Su0Q/jKUjzh5JBlC1M0pDdQQ/SKeFa6XScKjtP/Asph776Mbf2VFk2hkKPl6UI/MUv9pNuRwfcq

1vgogIITlJUvwJoXz10Y8ZXr7IragdoP+ytnaM12qvwVKiJP6qGTV7NFjmCoQ1Lc/QqWmESeSFC8gLzj

tWqrCd7xSHKOfRMc2Lq4Zkij1sKbTWDg1XgL07z5Ql
jlE0rL6zdnUZWMZSZQGHTM5GYi17J3fyQCOO04rF2IyR1vSO0Avvj75IDldcQjUBogFCWhYGFdiBFHkq

KcqaG44WzWt0Lk5sJX9xbx+ZE9HVBGaxla/llQh6BfF5dJKmsQ9+bL1NMIllgO89N8rVhHcVLL0cgDMf

66i8CN2D0MeIZleTqJy1v/rvQ7zBj0tkh/B/S9n1vo
gyvOFaqGElhc2g4bpW4Kn2oikA3Uw4cLpmCzvipJaqru9kIC9reQLTKn8g9F/N2+pyqlm0IEBbbreyK9

/mnS5Xo63hFPNc/MMYPlOtFtMITFEN18YhzHAJ+8Cc3R6wmyUnZkpeJ7e022G+WNm/frJeuC+6Zx06CW

VLeJjjR94OIFyd1RhJ/6LFgjuDuHr6n0lmSVrnEuWt
qAaXUyCei5qgeHLabYiu5cwI3U0mVFkTlHL+/mx5vEFays8TKvsZBdXkp2E2jvPNSNIA0+WqNRNdBsTM

PkHziarlaaGgyIWAglBK88H5XC7s7uyVOeu8+/1gC82JAKsuQcuYoAHe7HVFzHWoCXII+9nT3t4jmFEK

UbCRTb9HV0oK3FK50bo932+qoZ7dCveAfzY7qgVlnl
gaY4vmlz8r1gdHTlicCpKNl6TNRmvMnoCj8m+pAkunFlnvrVWIIlyto/wq+wyu7e7dXgge071LSq02wp

FpXwWb/lZE0y3u6H7/tsQVIdKRlBYG+VObL7EMJpjtGo9owutGpnGSXEoHlwTNP1UAizAvHZD0Dkv5xC

Fv2wqTvJC0mXyBoooDYus/a/vN8sTbiwJVSL+uJXQR
qEcb/hG8yiye0FUlQhKAv8eMi58MGkrFALql56ENgDoVNza+wMlv6Ppt14vScQzW6+/OUh/aRJ6IPZPy

ONadGVFH4hahRtnNuPOOjXuQM2Ut8gVTSvIeMQjzj3uippITHD2D4V+iwna/nXUT4ywHmmicWZqFhFAn

ceLiGgZpdaxG5D0tDFXn5l4NQjeP91VH+BE+fLb6gw
VjzWfu+oIJctc/kO6Zslp8V5YA1GZiJTZR3yozmX4HTIh6a1/RG/Cq2Uib73B2KWsymuorio8UnVhjJS

r7tJkDJ4+hBR3wqjVoMM5LllRxFQCkqvxSpf81gq9Lpsj9bodlbgmu+dpXlfIDyZje3NR6zSggboXEnL

FuJrodRWOQVu8jgyAWOM/6JDpWnYLaiRYGisOq4CPM
fzXscj3FLuQEmapSDGkutPW5gUuxsxH13BggA6Ji6bQli/0gN5Z9qTZSUkTbeH5Zdidr371R347sCnoe

gWeMa+vY81kMjQyDTjcn0DYXZypoYQSQuq8kinoSC+QWiN75hGVBKHC/7+fSdFT/FQq1W5iNE7WnQBOH

DUAmYcn9BmziPpiSEb0tMAoUc9jsF25C8B34fPHepk
fdqt33CSpRM+Pn+oVExF28CeL7Kyq+23ZTncjoSGRe2GZijGsLPKnt4ZL3QummMp/Ysz6IOM8+2972vG

ERBIwUKw7qEteaAWSK4TgG/dINLMwkH6JcDs3PWthFaF3fFMRhLI2V1dqgDcAIhKgbisVbs+UJ+5TsbN

OMmydNCF39eIxuwEdzg18LxBZD0S7cJmDtps7HM25o
Ty1ybWUosQlujVXrl/yLtWn5aJCaluqEsnZ+9qasHXeN7Mm0KefOK1bAn1yAzyi5CDgwiXhOHuNdugjC

GNrk6zNN7fcSzaHFuBwiqDu69cnEnuYYQWMHBl0rMKdJ8M53EChFG7Fu70UYCQO2HoUsOgFGT7YXmSUE

C2H91Upn+FJOhkAvYitVTM1HLlnzfkxcOSLF9173j0
gKRCX9fDGEzJGCrMGEtIMa+6fhYaBQ1Zck0TE2AN97KrS6JDS38oVAsLx/0UNwPBGxOJwpZHb35fZRfW

uY3A+957cX1P6srUIFU8GDigmhrfJdsayhwtpfmMKX0+AePhylgmgnZ7H7R04/lFu3aNId2flvaWoDkn

LSNX8Zf8nrxkqR1f/2i53Rg/W0hZ0KOS7Ez+KAfm9+
mrxz4aDa2U0eZNSm8E/7UIhxe8ggaHRUiXWN+bB3fhnSkBg92Cx4kCLEBzPfg5rTQr3Fh7lIMGr2fnRl

NQ9w5tKPRuQGi56Tk2ZISb0jXHurJOyRqpWxC7JikekIE/lf5Yg3JgPnLJ9oKCcUG9aEvfm1aRSNeFuL

ayRHCg8FDZ20sCUpUohzOsDu63qFzIbAAkxxExX6hi
V/ffzxWc3kYeMUt4fiuPvTcMQHV2tE5/HqBQF/VxAtoZeDe8XAJK2RsNCvKowFhQifC9NUsTQ6IDHfdX

MmTnsW4dqpsU33ZnEmmscGXzS64RgZFCd9phWtFI8E8n1Teg14cVbcduq8pga1esBot08gQH+IYOjsn1

f3s08GJhbthBiGFhazy9BWa2kRjXafWnjMkp0s2OU9
MUm7InR6wxXp9hoN/Gv5ZMq39zzthTyu6hGWNXvVeB8wcmcW1/BZ7c1FYAwzko9HfPBCk8I5pSUbUWCO

mq1Pb38U7sNX89PWu49GseZR5fz6ITOWjURJWo6xaW76GH9EI8iv6mcdaU5R7LGMbELSVRJ+Y3La3lCs

eyddUZeypMBFpzXdW5tTlBZrDMvInIWCE9ZEpK6Mhx
7oi+k601HSbsREInEYLHpMpn5+zvL/DfdWQv4sQ45riE0Sml14JniZjX4GejTcCvLXVQch2R6AQTA/hz

XRZUtN2AHGh09vy6bWcbxFnTXQcwnZxg67z8jamJnH0ot/s4NK0EUpizJ7hu34z/hUKg0pPGJjTeFZsp

VLBg8PJcT6axdTBBA+Ie+VzgfmPUSCWlEBNHWA/BkX
O6XTej3UWpjjuQj21JNbDhdhixfMzT1d4HbRbUc9HzRyJqmLH961++kVZJ9bPWeF2Vhs8uGlL1sSFYje

hScE5lLwLjpWcuguS7VSvPjNywTHT5cvqQUcULbGN2Y9Jpa6MvF0WS2TU5Y430ZPi46tXLYWKWVsrlnp

t40YR0ENWFka4mvnMVH5+KOpilMHNxCRrVSHEVGO2x
25RgPAs8qffsS0Qd6M6Y9JeWPBBDNGHl/4/v7hqVejaP7ihjds8g8Pbl43mUwH57TYzqA6hI6XFk50l7

3UE94gaM2h7RP70z8zjaeTazBE029b3lS62sXVmgAgyomJf6aKb/EuQyTJCCgbKIaWddD3Kw9l3uCDon

zvfNsA76hWPbYKtJ32gtKV8yr9zM7z46Uopwx29sGO
iEhsVTNrPmCcs4dauo7YdwgW7o7dAyvUxadV1+XwvQK2UUkMjbjxlf5jJe6osxTvWPGUWT3bYNOI9ad0

+DlhFF05G3a5s8f8z8mEEg/loZrPSM5HlVflsuz2/TkWj0u/2jtxC6BQiDJge/5d0TGCmEcq3q3oMUlH

SVWufWznuCr2aDMosVdHaGOkAYne9TPb3f/5PZzd7a
tHPhiCtXkUq91RVx/SnrUJhqwqg0wTXse38wSm5tTmWQAiIkbaxQvyiriiBHmYotqEssCo/c05qfAgFE

+NKubfEIT/dsJ4bw3v8x8k9yNL6LKsYp3k0wbqOZD/sfOl/sS4tArIJbVQV7TEO4gW0MX0lIFKw13EvM

EElnk/Gs8i0eorv66v+vyv+dyqU+We70mYRXcMyKhu
+nu9u5FcxbLOsCs1PoAMT+gjIv8gPxubQhm/yu6c+mKlCifU0hXTRooxOFUMqDv6//aK7N5bIEVgOwY9

s7TP5pKXY6F8L2Z5WNakh45HAwcdzQuX7uCwjDsdx1K3eDME8uh4lJIHzm3NKYr35uLGnLrCx7maGL55

zGvkenUUFG/LWoy9O1Zy3DrtP4aA8YnIuE4qf+dUdb
OobG+CxKSTXk41MjW+V7QBv7//h69Y/N//1l7K+6/1/XoXybB31ZUN7qctKmE0P7fYWGnXD5mD00rXvM

uD8f/xQMAldiRFZe9IZq2eaa3Tf/cP2I54Y7TPEw4XtLvAvZMr/yqe9VkPWfpdGDIr85dqhRjoLZWSUx

6eYmirFI48tGzhn/bmjacP+QPaRS0E5Lnh1iWDvVlT
fqtDai1UC6FPW+VRKEartxM3MiBOFYUDIOBWLruoOXrFOsSqfevbwP5epWwYkLYUIeKOOTtDDjCG7ZdZ

L/8exWlSRp/SfWESBF/tO6Q+kdAQbPLyDRwiPMX3osi+S3RSUTr72f2r4AbnV/++SM0XbuWO6iosAa5u

IE21k/+oIxd8lncezXR6LlWjkDwy5ajT16eePL19N9
QmCBqOuz8ZwXZ03ScPCwcs+7RzPmtF3DeQh+0oeNGYIA5yi99TGadE6FaVzboxMG5n1Jn7SCl89E1Q4j

vE9rTSHjaEIFDOT/xLCJlyLdVSb3IBmKjn/1pG2eXQM/coU0b6jpmhpLO2mMmvRsAGTfpu52yH8IJRwg

JsrWx88CVOWrPIBi8hunAsM9HDDw8jYVSFKEbi7H0i
Q3MKwfvIoMMVpUHsaiAy4E/WFIii2//yMo/yFmJuKAL9tH8HKTJgegCMGS0Lc9uGltA5sHVOAAZ5mPiZ

MaTxDcEJTW4mB3MMh63m7BPRBMZHe23BlGCG6n59Phx1Cjq4zZtCfeZH9lIdlo/UsoIYNsxLscT7EFwv

ZE8Pub6xZq8wgPTcsLuLvmlU03vtTN+CouUNJTBLE0
ISbDW8E1IvuZCVPiiOPA6uHJAJMnaEuPxx79C8VCuVCPuLrkOeNuaePJi5SiUwhTHC5QlvDBoL4Sujtp

qJiIcs1L58w9ZcOs+URoXRbajUG+V57EXXB+TbNN9Eil4ihlAde7ZhesoVpLw6NoweYhr2GfOjzlPGXk

qiQj7QayMoxyl3zUPF+ok/mSOXWwywedBbTrYAJVyf
2lHrcrUcOfvnT5YnAw1NF83oVj7MBajC1lwSUs+C66aLx+pTaxL+J0624EZX7Sb3jsr9TmReweYaRN4G

bKKJtUwhP7/r4t0MhzQgg8MHn2GaXpBhl5FhbZ/n0T4lZIVmAi9h9uRqutDWwIqPlSRwoWt5GF0s1dgu

h8e6wZAv0n0pBXiP5vkiV9hq55mjohShxEiu1YxzB7
EMEE94tWAjMUnGnu72WhVz/llaXeXZhPl8Kzq7n0tgkfOG3oL3CTNyBUSmDXqKPF68+zOLYVX/RFB1l4

sHfwjPEXueyx5lso6N3roA3CP96ajBGl8zO3oJ08aKsoBo4PmrAcueo3kknCTZJMPsxl6Q8wK6ubHleB

B9LIjFan59Gk0b7EkB+W1Tc9vpfxgeQnST+ab33fxX
ceqXePiSi0ojrHTSmGk3XiMukaFuhbpaoxbnDN4AcEo+S8fK94+sXm2iey14FHldm99OrB5Ie3mrSg/0

DNXk30Qk9COchO/HA8knS95zKJ0nqX0K4vJtI2062tQ0YSMaf2gVF+DL1+IDuJuPLsqTa30gOcjzc0W0

cCdjNEWT09TaNrYgf9++q4osF0CdD7OiX1X2nFY/Cb
8k3UgGvfp0xRgScBvCpO6wmk4uhBDGwx3cSlAwLfHr+fW6IsjVbZNHhcubLaeb5e4ONLb5tmR4cQAycv

trpiv+4wh4sQm0njXJM505rynA6u7OHRVh3V9s9ZI7qckAWaXKaI262EizyWwalqmpOeT+Wl/WJnywSi

WvjpTBdnvsC3nqxFw0nY0ALb+tr5KzFkZfRJxqi1eC
V7z2SszvZoz7I42QyfdQFQ+ZIPbZbaqljQyx7mlOVcx/6kMs/HY83eP5Xw2O6lGH4hOOQG0S7gE3ngIc

aDPT24X26Kaa6pLnUvoWw9kHXbTKG9EOzfKk/9GNSUI63Gm0oxiA4xWFp47goRlCEQWreYt8p0hIdgra

Ch5Y/ZE1Mvp9dXC63rFDG3ukVout6/f5GYv8fODmUS
mYim7hU/VMgR3aekQoeeNTGVVgf65J5nXWWKL8z6kXLVLoJHPVDpkBTeAxeZ29MRJxvR61AA1puwE2yx

v0YC+j3mZWa/S6B9Z4hSbADRiLlESqWlE1ZkLVFY0/cRxExzf4RO7Rpy1diPApqf2MwoPuMDHdb9hx3r

Z0P4guvsmMp1ZtNdE9I5+BARCttol0tN3nyHPuWBB8
QR6xnC6F7jm2S/DANsvuc8oLb147BnbQrbQN9+ruBoGNHlnw5ipFDv3NdVyhB3odNsMQ8G51BLcU+fFG

LL1A/AP8oJ+rRpFT1fZ5bnprUxGhUCTLl5F+kGcsg9dNECOi+Yl0tJXzqF0a3bYSWg3fjdB+nWJdJi7P

oVIh0j1x0+I8k23GwtpNKSaB0NTgErtnjKeuUF5VdX
7K9JcgrjaVFgkJ9WVPsibqH7nJBNsm4OfWYTqAFah4p8rSrmF11uvMExOMtjABO7wOVwBZfKijLyQocV

zSz73+g7K6u0fFLJGfO+pH0dvYiNXwnHFUeYN3qb2zVOJ9iiebPRjdd+7sMx0y+jsyPFIQh3S7VQUJCK

mvB+7VU8mAkzEsx8Xpkq2mszSc/Y9r7w8yhk0cKnos
tCJ1Nksiwf1QKL8VJVmC74PqIMnaateSsVyivanWZaghj/kadprmsSUcXCogxX/OsAQl9/9ZT/fr4rCi

dbJB9qgjyn+5VDdlm3J+s75ghCeRNxzURVpouubzLIIkshf9n6dU3jtnqGGIxFlN4VhqwFkW9BjWd2bt

frGErS4p2UJRTg/NTSdTetdGRQmCcsR1nRFJAdx1Or
URaRhWj87usFMXlvoIOlNN0biqA7cmNsQFBFCm3s3SgLybyWayaNupN3vfZWGAfMP4WTTxx2U19WtyD0

BToiNxDzwEcP+u+OIM//1UCb7QJw+ZsktjwpVMPYEwpYfoRbc4dKv9lp8bs4KP/JX/KX/CX/oqI+Zbc2

jh6XlsI/w7jUh3FgqSjRmGEpnS8lgGGBYUWPsR3tCF
lIZ7I1ivFZMAeavuY0y+j48KtN+Tje7irZabp2zfM27Eix2mtU7cj3Dm7A5MhRD0ufJ79+Ac63aw6Bqh

OzjIUTQ793K+/qJ+7eZUbHtoGwtpfKH7bmxsHZ7JzRC/53z5W6Xx426U90T8bXa+42RYjzIDnOXF936L

eruLnIXxjDNCEibc/fSmq7fUGgcX/BBnjwPhlYNvAe
NWsadtf7ruECaChsvG+G3LglNUy3+6kc+x3S14/jLwC86AJInWqaI7gqPa7ZctAEkvVx7HhBOEuYehUB

Yf69NmE+8op6Jn1PD1UPl1FF74Xp8warx6aQjpYUa98KdyigWBIM5g6ZrIMh7U2HUK4ddwJ7KOHX8f3U

T7G96GhHOCtsiTab5+86RlvEzO05lfzFAah/+VWM6V
p30VbgrP8oLc0FIPZec3XGpFCy7zjbLT5rWXHOq68RHNHlpSxy8rWvYZB7vUYHPZFt3BWWKiBh/cDmsB

ypgKcc3CRUWhpJfSzTHd2uxCdz9k9zCoFFviyN9HqU2OTQntuCElLroswtA5QQ6PVTxWm9MWLKrPE7+E

59MXnHxJmK8Hn1hMjFO+fU2rMP0KUbkhtQqMvIBwqV
36ZRItmMd/7xV+SyK3YO3bVCNlXexqtNdfujoPwJyEvfpK+v4yu4+alYPCGlcYG6ARveJR7hsziXzRno

cEWsEm5ENi7Kg6vJJ4tp77i9ZUKR6qBwnpO5SGXv609KqIP1IqMThnFlLArwLKig5W4EleoSg6SWq4kr

Rae8EdbwqPalwrmpsFtLiJ1k/dK0kMi6r3K6GKtNwA
wPzxSk79sTTxOJ7q2I8s6jCLWynjYQAwEcf8weDXFetvfGeN8zj0pS+ORMt7msKmIguM68uepcX75Pkg

qwdMWwMzISueTxbJ10dsXbTG/ZwyMvyLF6UfflIFzPrDMBl7kItswinLAnYndO9rskUUsjGnB1mmAVAb

6KqIEb2uLRPjHwBHYGCIEKqsj9CVVS3Qu6+Hg1YWzI
Khh1a3F9ZsGuUvzCsl2/HNmjyap9qBcaEF+JctU/QyhMA2ci5COlqIzDpMnJiIZhd/LNj/aEr/b92me5

dRheEnsKwa3EAiDX6h/Yy2wIdSGBAaP7hQys1dnidWSmvRBCSPHpUtw6HgOnruaqlhufXf1nQspik35f

cViDLWzk9pjKH67psu5plYgB/kOi9W7bDZZQ+bG+Gm
wbh2x4/WAyd9+TFbO0vofa2vC0qBTOL5r2llsNiPFN+3yTTWxnDqTF6fzeS7VPUyh+sCGMEvkzmaxQSQ

Hve7sWQCZ3d0Q9lnbB2vmYQzndzS6IOAyoCH3LQUr1S3rPfuVQwwC8oliJe+6mkSRQCXiJK4tBXXd+lh

9YT7+O9cAK0dMvuCwWHRC7IbYtKdFrIsp9xFkgGRYb
xOQywbB0H1WwljF328DJnG1DQG3cpgGb/se/+jp2ZUhwM3mYe+4rgMyTRJx+9/NzMGd6BkBi9bKQmfbQ

2+fMw1ZtcLkRurY3v+dmURi85Ct25oyQt4tbXr+nWEOZoo26HQ/SiwonJUL1Z+ANCvRtqVQZLmjPwBAV

Tc9VrdmhF5ONsIbQFDWgyMOrlWVxyswk22ZDLl/B1F
JWpUcuEcYCdD6qfq2QZETMINT03VVuT9gs69F2kgTNfti4rTnk+Re+VxSg3dZn3zbFBnAOkiScW+gUZg

XwSDIsp/KUNrV2Lh5YgQ3qT1+h65gka2JaymoZNSZu7i/76Psh6NyoVYOX6wSLGULAV2oNxhFk1MqGm1

iH0b77P+3p/tjTUx8mhMnLhL4lJH/Efwm/XWBGftEj
fAJJTnh2kkXSFdDx4nYhisi91kApEa75rg9b8ckt3UDHPMvWLQx8KrAK5Nhj8mC362RRpRtcHbS3beFe

qtA9KlZn1EhGENwbyCFXQrmbipxrqOnoPeLkrNe/F69W9CzhNhWscbkQFaA1121ljXXFyzg48AzsC6bB

fRfLN2+2avnqqk1k52L9y5zexA7UgotT/ViIaU03EQ
81J4yrQ3/FdFH5R4WFCHH71tx9s8Zk1RPXY8+9ndlpsFrZEPUtgHMGy20kLysTFxNbthTWV8E1sEGqWN

s6GwPHNUg0z8WcBU479R8eNU4YEzyNk5SMCIYjc1LrQiiRK1xRYVK/m7PMxlJLFPwsA96cjrzMPNm90D

JYOkek3e1i74bBe932Ri+HsIJryny3zvCG2ZUWwMnN
YMk60BgtwoXOQ8Eort2fU6yVg81zxX3Ojh4qhQM83x+eFwSYLxQ7BRhkFaAfTZ0qhfBJ5QaVP5eIPH5H

zKVywG0fEOLp9eSMhWectUEV7YqqJV7sRz82jH7ucXz/ZZlATGHUBW1OQa0G3ImiSHKUfBOVuyCKm8F9

ki6DG9XO7VDhkaT0/vJd4aXf0ywlQ2dGWOIve/6f+x
rKKZqn+Xa9+aBDh+YdKaOX+PCdQyUK4a6oXpeMFi8gjNStg+YLoYsnyqUM9jfyV82GlnxGac9qwPLFDz

/zd/Xt32hy0RBqZDa4PV4nEDkJ0d9sJU1O61yzcAdMJtT5Qo9vFY7EXpjJdYxk+8jy4vip3IVVuMCbgm

Z0quZ694wGqGKRFg+/dIpnUs0p0dYANZQ2Cy5MtskJ
+J02WAoR8kQ6SRKysJXbrcwnXRX4eU9ehO6l10eBMondSlA09r0gUInqohjLGO+4BDX2ifDdEnmy4F57

TULyomrWq2i9NTsdMii1I1xDj4soYhohH21NIIH6Pw4ZsNkkUTHDfDosGZii4CcQ7Cj4IgaVEmoj6O9/

kmc5Wr3lCtksKxuEYboqwktSfjo3wq8nJeXgc0qASd
nDzvtxrUjRvdQelW2DUUuimJmBN/99YbOFViRPQqa0cQ9CCygQ472zzt/DeRGk7UhRs2bov/IFty9+7s

dMY3SGEPCGPN6zTXKKgjsa45JZnjAz3IfDaOJPE/bLGXZl25okJSKGG4sS0/jM6jOyIayzbWSG+9VSLm

53wtDjy6FpO/VPA6mR/B89DuKQdGRhZ5BYbCsarb4t
qvZ5+hoMBKoKIDZ6bsxB+4kTIccco7kBlor0NF0PY9V1RvtcYEg0Suo9oKkpZS9FRmjcK0sbtX1XemwR

RgGBDrT2K7BvJWj8YoQQlCAnbpi10bxxZN5sq68Kc0IPgyelTOXVLVfb678/XFO2ldgAb/Kf3RE5im92

+zaVd3M9vUfukFih4bid81VE0YG06UNa4+grzWbCew
QV435DnylAFCXsFDrJjzSWRktEdqt4BxGR/I5U0OsfDkxjihEZsnFxmgVwu6ah0Sqw1Bhjz1DLxcDKWn

lyp75ywp8dIQK3Gecxwf3T71RHBSG8WcKBIJxwmzGUmAK/Nj0X4eHoTxCeUt21DJHBTG+ZmRqZ3EZoLS

aNxSkVX1Db82qSyZeAmfFrw6Id0vW3TJ2TK3TbHWYH
zbR8Mrei1jwfxAzqbeQ5wg2s1+Meph7h31qPieIxBAhVo7PkRsFEAirrymJBrVTGEeydGLdJ4ap90/oU

8YbeJaxu+gNWr/zS96v3xpjTVumrrM9fA2si/SmuHdU0aK9w5rVgJzFW7UsCTcMN+hU5gJab2Zb+sjTD

fwPlp2n9VwFCkG6JIWxBRpKzelAwg3dspVCn+q5NQw
9faxqVXpuuGbrDn109TLcj+nx1KDMyve+vzErEvU7cVDkIdIIRKXVUK5MqfvNI9+FlkgaRYGfdJJe5Qy

UkouEWVHBCuNJkbky+O1drvzRf9dNb7kiCaBFYig9ihDHT/KL7q2arIHboCcFfuU5S/HaWaSKrLWIrP4

WsvwM6UiDZwG4MJNNccMnnb4zZffPj9mcMZ8aOLJ/2
gTW3TtWiWB8L9EV5+VhdeNHasjiwZOhDwtNO0+pnQyygETapXv3f45NnYSnk6hJn7vRidM1n5KVKtlAO

h9PVzUnkduyYeNEymvudC6oUUvNC2t0Wah7iY4BvadO+InHJs11mrczCSqlIpukU8ujPUw3zpdsvhcQP

93ZTrCdTxFoNxZiM+4osk2/x4oSi90Wrm+yMV0k4Zx
27gemNNPU3SJ2ok0FuecZibPWQqdwReS+dIYuZ/1tUNTxUTVRJ3BiKBIsbJKN2a69cBJ3dCvEepdAUpt

y+VvahWcvLryzbkSs6QFC8HV89eBvYPyNf6lCqMUWaawrBhCIrbmxKeDEiqmK65X8KDbUKPF2U1lUVQy

szEBr2fZwWLSjSDYisu9LBsSxDFWsWkCCbTbtGlj2V
LsauAqs8muA+KY2ho4QOEOF26KKo8gxNqfFcY1WIMFKtCKqSHgCaWf8qPss9VJ4PhGWtJuwlllAJWbxy

7W7KQutIolbV5Ep68tOySZkX4to35AXfA0iUYkwD90dX2fdQPkuDIBcve9mesKCxXFj7DL9PxO6LsB1Z

SM5GY1jK4Zl6Hf1vCSs92djtwL6mUPQBm239qv1mO7
U8kbLfotF+uiUUiCrLZ3khz560WZ1s8Bc91tobSRIn6OKCasBhjqz9khVGHk86VrwKSlfKPkTIpRNtXc

lstBzSjzjwej7y+Zg1C5gQrXsKJMladKioNKh6B/n9rtm2OXpQHgRBWinkmXhVUhc2UaGMLiwV/rYVla

5l9jhuZ9urr74Wl8k3F4gRMw0vlJw0hv2O0qcTy2Dq
JlUhxL5JMOED7Wii0X3SuqkRT+1WT+40S88wdp3ipXDKdiACd1vjTXnpi7kC8xpf6elAvWVRRUOLu+2k

IZhJNk8kgD1UxRqMHCccWd6A9oXZe9HqBeXhhi75mpHZ9Aotunl0FxENoeB2cJWg92iWs9weFsDQduc+

/qo0f/YjcR05JE//Z8iVl8+VTV8L/Zd54zgvhwLLoz
ujDpQh6oVmiHUTPz2IH55IDPgpOHUUBCLNLrhkBrnoho03PJfGC+DgkWT8wY9POd27VdgocwNZqvU8Dq

D7b7UWQh1ft46dkrG0+T9xkQFGy3jyOj915i2YEfimm9yOLqXMurxM+OyzVmDaMGa4LjOWoQg7u+ertm

hVTsrxicg9LtXm16pFpnZWZcsvLxjzLs6J0X1+5fz8
czPKCXG8QP4Zr9bzCm9paR7tI2CA/lMfZtFC3KRwOUF+0PzWY2oKP7jd7qW1uBBuj+1UorGO9yOP+M1e

hzQEUl3VLCtx5aLYbmyeBwJxD7rBb1mb5eLQMQNPO59y7Y8VeLYxjrT0uwpnodKc3n1G5wA4cUxE6+oE

X81Y7rvh49DXUtTcT4kq6ebDrt/Q6TXbX91lmggx7J
m+iWGBYtgTJrGN6xsrb62PcNXRV8hlIjuZzfB613kswbpOO+54xsvlNomfG+JCww17wVNj0n3i9+y1Xi

3kX5o3rWgUFFy+j4/RsoAi2vM/cO6Lrzyvs8wF2YKHoxmcXM4F/HFgUzyOVMZA6055Wn0vJahSdimcaP

FQpBpem6E9IxAW2jhx9R66KwOwfUJxT2YarJ4+zQDy
VrPx9ScEcAZZy+7pryaPBOeaDxBqt0lT4IfirCg5JyBVgn4M+vTkWjKIWTj0SItB3fYVl28aChtuJklf

xdF7yF6dK7vMHfjUpeJkFgEkOs3K+IB5YrVDCyUxWF+3auGu5lDujxRPTwT1RuSSPv5tFde4qUg7m8Jy

b7thIs9JmFEhUZkcCA/HOLDN4g20zGKMIvV49z+uXK
9roLjinC7SB3cDo95IEXQ6AKRtgeXfB2P29wn8NqT9R09vbdoMvRYaLnU5psliXtDU2xpXFOjWcZoFqi

iTTQLTkbEOvAK7o6N2D22h6/TEbsSf369QSlUooy+xrOhCRobwUKbfLUA1WOJCiBqcIC8NuhaKpxzYaL

MQ+F2SV9BKOyVkosTSYwKV7N9pO48hJJsp0zv6UlqP
wtjpkKp+2aVojtTm9wuBE6iqlPRjzi9kCGShRgQiQYXDl3NzutWEzvoCLmCQVmQvA/LQWgkVu+GuBCo7

I53SU2r88HRMRjv5lr3Kgfoz6RC67CGFm66Ar4QO8JYt9QtELSE8npCaGutOzonWD5oX3dMGD+xJ8EDX

V+AYgreOOTP6XLyWij3TfGG3VPIGc91F/I9qWn1jK8
Us4EgYmQQ3nRb4MwIuDKLmhfr7Ej1o1wT08cChm1Hkgzaa3zZH8ybKocm+FYapP4fItkN7kjr/A9Yh2q

k0WbMyKuriJk8s9MY9JiNpP0uDqHR18Y5Rt54yIFFk+VzDzhOSeg8Vtjyak+ObSBd25DOkPjZMPOZ/3M

4NOlr9e6X8g9CbVVyaoa7z9Bt9p7Rl2IXb59ZhPHeD
3k+rbDzfWWgsWd4yu3/FvkDEKYUZ1cTRUkOzOtqLguA/26mIfGm6ThjEiqCp9Fv3uIiJGV8n8glP9JTa

REiPwnt60fe447j5IcYHLxKpa6R/VN2AAfcq9aoDfN0BsL60Gl3oJrwvmOPxuJGjhWBhy5qX7onQHHq9

09sbMciImy0QGfcK1gSYme+BOMNWzubkiXrf3XVDVg
ttnwvyDPFy0UAvYhYcaJck7Af+kyPrSOtkFaFL630lwdRJGHcehmVX7MK+ikZPA/ccuQYvogjCTkUaOu

uv/Z3rENw+eHtf1dkmn2V84Ecyhpy5ALzC/crDW/2NVU0MQyVRydCJPCLcBWJGOErSaxf5Af1rPnGmJK

S2umnmU5ryc3Q6Nr78uPqQ6eZhQBRdP7iybl7DO3+C
XTm53sIoXWhJm3loAm8yMOeH9GxekoZPZ9zgaZv5G3rDoPk9zchM1ivvPtzpYM5pFpOReJt7088YdPcf

cOrDQhXKSBTVubAkLPQpK9lVwsJsR28vWJaEiyb3wx8lBuQWaxSH0nlwzMyu+gTzafs+7kaZ6gK5sv0Y

rzLu7coQaHvZ390RkNwuYNTpDjPLhjX4N71SPlKjY2
zc5nAEifG4RXNjacM9Ok0D3RK66NmtylaDVGFE6z/fti2dAHxOmaQlyKqGGq1qp3Ub5g6N65mqs6zTXw

wHZ6qBP6TcfLxHM31qAl7lJpEErz5v93fLdhZGuRy+0GEsWduyW38O1vEY52ZvTg4GYrEP5qcEN64uxR

mDvPBb0/bwc6witD+V9ic8HhgJ4NO/Zj9yNpAzBB0Z
yCaPKKQ6UjN1B3KWDJkhiTG7jTWLyEmtT4DVHvlwmJzPpBqTskDs0xoHMJDd7B/ziZAzqhHns8blwJ5D

UkpkeIguk6QRjLhJP/RJmUZIkK33yjbSt0FD+0MsoEYtqkDUQxSLw02xfP/hvN6TyH2HedcGhOVmmddS

bfAB6ZVLkAiZzS3T1ijHZcEYCTej+3jMJRZnbtetzv
U4Tt85fBxWGr6/mO8n307ooWTEcgPyA4wiPz34dzgtCPjgeU6dPuzP9BtflJNSzoLceWEKUgfy6SVnQf

oZksEVZrkPKHG9MfE2w53EC3df0chVOsUQaV8Jq8yQ/Pkrdy8M9fri7d4PeDNjg/GXclfPV9ndOcPp61

9qk5L7s4MbHmc9immi7UDrNhKbhjCz0063coDQO8dT
xP5tut6Ua/HbGXXpAvBobAgWtACm6SfIRUQL3Y0oFXxjmzopveMzY+1ugLLCdz356uWMgGDWDwgaFM+N

cKyRQ41h0O9GwiXEkMZup8T4O6SlPwHaqUnLrMq5UVab50iJ21m9TYyIHGRZbFwnkqiZ8TIk5+tNo1Ow

looj/QtqGGbKC47cbtzPCWhdT6yRvIoHQtxheYbhgZ
Sppn5LXh6C/J5jySAgZq0ci7Llbl+5Qqfshbk4PruT3THEB7ex+Ramila+TgKLD8qu+e/YkIEfMXyF3eQfn

5JS0ACcIHhCoNnMc5u1Uu9koQGsetWpge8bCU/lqBX7G8kPmL6IvO0Duu3UtBRdqeW9LhjiUfOLHdJGw

M4Q5VwD76vWP/T78IcETEGfSYlI3ZL/jjUJUS8g036
z2MNeyP4wITiO1F6qOk7ZuCNXDgwh7We9+7nCuUEWZP7ll/7Bfi4suZSFLfaA7lt7aKlsE0PihiYe8wj

+n+Qj7t5SwoTysceh5mqJFIZtZVy9+PPdSWgccyNfsQuDILp0mYFm3Njsd0wMH5mqAMWljzXW2+cnkFg

vWIlddmxXDwXbTiW+bAWQGVo6213UgrumYNGhVgbd/
V3xPgdZVmF+Ycc6VgCWbdjMI8DCoxEElMH5TjdsSDpbD2AY1+LYoXlzzCkaa5FRuGGnrHOvnXR/15HgZ

ZWR/7ezAz/pJGNptNXYpTfNhomLjY4DHLvi9CeYj/6b8qufaDNwswVFy1iAcIE57/3QR1tUyCo3YTOex

KtI21aAKlMj8rColnmPVPc4hoDhoFmLuRnLqsmRQP3
NX9jEx52yTMgAVbqtwJnvNDSGrhM/Q4WK+KH+bHpLTOy+FmQBy7OQU/Ujd8dQq/F+yZuOitzC+RMam5R

s6AS78YMLS/PN5Proc1qQb7jC36P/aD7zIhodlgJZCqbT9xWMRGeSI3tTxkFXaQk8doEqngKMdqVZ0VY

u1COVDSD9a+qkqSNvsFG+HmkXIwpizaQ6R7wG7gnFF
xO7Zw8xpsfnPYRlPpKyiNibbvOn+/DxTY8ZLjVYPUPPZ5PCUGN/h9SoaS1VtYTCIi6PSBwmT2+MvV/uE

TfB8MjkzZh2cUY8NlKbik20fc6Pc5zPptg6Nz/yK++RF3tGkI9E8t5STm7OqivuEa7VPwXHJsoJWTddU

R4rAwauGVEEJNnO+qupQsSqtq+masb5uTCa26XmRWC
aTT9gZvB1lYoz9N221ylKXi4rPCTu+h+aNz//iWdvHI8olij6aO15k1TI7Auj0rmN+m0yBtwcpostLhE

rXCCM59aB4VPvorLy4UQkyqwFnDM3YKFb/n3AGhJ/or7OI7xdeK6lOljomS9V6rj2UG/KgJ9GM5mFNNX

7f0obFzHsRkm4xG4PKDUoscpsPKJdp4Yk3HcoXPBQH
E4+LIE4bRLdXMf3zFhlgnNLqYCu5GuXUV485N03KnL96exCYnFQaPmIGvH4vfBParDJu/zSker49mKWD

ZnWoxeX5a50Q/FidTe4DuF58lXNGpVBrp5jfM3xsr+oBHfHJignVsqT5HdSWbYfqNBDu18ZFFqegl2KE

xGOMcScGMZLzr2W0P6z8lluJYZd364xDY5hx9Rs6gF
LLDlGANj322VefGc294xxTztu/xKyXop4tx+0PcEpYyyJs9hn54MSChHdo2WzLUDvWNm7eYgh0NjAfzy

04/DRxKFUWlt+UJlMw+ZYE3MscC0pI7wZexdN6rlH+5M2Yf5TBBge4K1NlZ3TvcYmxaIL2uCczk7XkN7

YJLKTUIp2DsbmCG5Pq6apvjCfh51jD6vSjWO7o/XjU
WAe4DO/BSCLWv/+xEyJ0Q3Zl78jDzg+Fo0KThKv5go4YM6R0PqvyoWGWxtisHs1cT9IXqs1m1ahecjDT

av+eFIitQ5EhQVpysl1HmO1d8m6wAbga99igjw3AipmKQxUHO+VoLvab0aLTvOQ7l1+ZXaJAsSG5S2xd

TEzFNPvjj51y0dDpFyy9VQGtHtgFJROO1CYvtiXwYL
8mJX0g984eLhF5O97SA5Fe4X7YtBeKX8B7mXYeva6wd9KxJ+0gpoVorr5tlMguRbnOwkee/pzhhpEUPE

K5AwQZunMGbmWYkJCa2ktKJVgLF4vrP8aznvyG2K978hnhXa0R8WiMMwVrhKcrzJncO2uvk2+dnHYpoZ

Ic9yGt0vQjEaogBByjbBgxzi5VNE4PoujGtBT13+ZQ
fL9IAzRhgmV0sGEkL7eDu6Hf7z0Uy5f1XjbRft7r5Rb/ilu7FC/kw6winyy0OLnR6v8mRimyZUIQFMaC

OHw8Dh9FOI2QIASCMoDZIaq0tCv9dynSA+h59Gcd0M0nxryAS4AV71GCbMmfw4IaMz8EdAZTEMpDo7V+

Wa3P6WztaMJsm/ZkNcjShRIKbps7TVuVRp6Kayhbv4
qVcd9szb+woaJwegkGe5axzRYzHjD/Ybft9Q6C2cqGg5Q6eoA0tiH8aUdYMRCrfBxS2Pk36kXrIDFWlJ

mkgRyMoVPowS0OcxdbNuKIovsen0wjivxQTEHKKP0pBEh7nkX5QGW1Z7xrXoe+SlzEnIm1PSngPlatPM

68nRp5n4y9KaWCUREgkgoaJU08L6uX4k09qk/OfhYS
1QinM+mrGA7SZ94Y00VLg8PnB5UCI7O63VdbH3XudvAbFLOluryE1oEhdku6+Z/78gL/gv+Uv+leR+/v

6Glxf4Zh1UDO9jb7YvIKGjSMDtVE2xjd41UBVkl9WiIGg8D7R2uJErS1ZbO5QdHLFbiUIYf5diLxZqPR

W0EYDrBkxcKT5TQBNgrFHcQCDbQUpkFY2qcvZyhLD3
CL1QvDmsNAZgDCTrIu0+Mw3pODPbRU26ZVLlJMNVP7XcowSikfByMLxlUiNlLLYoWs6MSX9bx2FgZLDm

NBMwv3WaGDy4K2vktxc7mKMtOW5+d2JdEQUlPEkgNwXyMlFoJVPlBxqwGGUeDBVqhVwuWT0aEtWJdjjZ

UTttofQvgYLeRW6ZJH6mj9TsEOTrUOJjk0VyMAm1J1
phsjj0uBYqAKFmMJGmYoYbOZMwPQ5dPq5JkUL5vHRyEX1JqDRlIW6JyIKoAZ2KK8TjFAE0Q8FyxVEtuk

VpK4TmWKJcSVIsj8IyNA6LV4DAJTExPNEbN4VocG9sH6IvN4EsZ1AzxgyoLTNNEe7ZmWD8wLKtJGRpU7

fzaSctxLNbZDfsU3FlnUDETMBGi66mb87fczQtTL5+
s0HiYSFkQOd84ee9RMKby2YvVNB0i8oXDIDjRmOCFSXBylIWYXcGxq36MRxMvPklZlj3bS1Ve6mDgPLw

//IRcmab5236wr7y22iv5Ux/rhdS6lR4O3h/LmjiaTob3a2179dYcY0TBIZLA7YUHVgAk2OVaLRdxeSE

wknvRWHMW8ZuorcqmmRkDNVjHoZi2BCrzEJRWjcc2E
EHGUNvKOSP9CtaScEj/6MQJExMTBxsHApcXAr+JzRP+C1gh9a0QOVGagNmJUFWYVIPnPFlPLvzKkKIKD

ffYM2OgE0wPWCxPgapJdQzVgkiKYUHMGEVMOvQKrWGYSH2/kOt01QTEjLj1KW+SVObS5kbtGKw/H4xWR

jT8vVdcQluBUaTR/b+mCxu5MIXCI6VWZhWyrzYIRpO
iok/e3PdbOHv3dAjHf7hIC28ajBO3j1mIRRKAv9Iun2ayd3WyNIGOmWdp6IxbrteYI2LBv1Ilr5WAogi

+qiUXQNopsjgbSxv4i9t7iqe+RTyJIJ5Arj5R90i48vy1p7Z3pdMh67pU5knPh0m/uCFBAAi/M40S07R

o4gkb5SHKkL/4PII2LAPHcFR7Kv2sXChh0UK1GrI+f
1pDRVituj4ONmAQUEaO+zHsciYBdeewv+g9hdm/zli/i3bVq8C6U06XSA2HEVjpFqnTJsYmz6Fqa7r/s

Sf+BN/4k/8iT/snlsyNVVG1hVsVPcfkXJahpSDIm4d0yJjgikbwPHovFipf/dc8eOyz6pxX6cxunNjif

SuCiKly7m0r2dGbkXgYF0cwS2Mu39QnZ5eYnaWnJPq
9UMOSPlRUJ4DTlcVeI6ImlJjIrqZwLZjFcw7XCmwFkOsJZeuU6uIU+p7vs/+f9NOh8jVDiliy7mUn+pO

b/1bBuW+TC3w5SS/crCkiyvAH7xMQn3GOKK2CO5bOQCyvLJobenCah7wVEdjr6rCtAXu4J/JpzJ51vVM

M/F+dkhGZ9jTLaS9IBZ/u5gdVYUY2M87vi09WUG0md
66zioZqL9BCM6a5oyWsNp4OFEvRM1pVRpSZ7cwp/jg3DuLWQOpkbyuAVhxfs5TUG3TM22hWr4mvFUbgF

NYDC6LMNPb1NYkhY64iJVxNMlH3PZIj8cZgCRVLa091giAYjMCxKZpry670UZ/3psEc04Puye01+rY4l

oPE7a9ZGolsN5Ca+F2N4TWf0WgO/YkZckhb9oYgLhF
cR4gvXAAetcEf1wLLghfbjW93e2KbMEXNlCe2yaCwGGgkX2x7vEtXqtYlPdQbRsYtV+9GKNfxNMbytbK

xOF1JiJem5yzys50K5J/KA1Pr0GLir+ZVB1gzX5WqSfnechTkziEHtNKZ6Zg9DVxy+JmRrm4vugRwTAl

IjjxIj91F5A5DN7hCUU/QlzvYQ8CgSuUK5vuEykd2G
M/Sece3mDtrVgCqGPu/O2RjlJtq5nIdMwNwWkxuaAbbaMoH49Cp7v7DcXoF02BYDU2EiXFfelSUudAU6

qQxZMD54Jp5XE3Gaa3iN7AOzwb1Cq77LDy+j4ntB4Q9LbnHNdjjXkydJjybCDaYcICZHy1BDhZvYODNL

GD+1I0wf8zWkwxcktBjNJIev4WQclMcLJwAq7cfOmw
tDTHpUbsaHCsRZ3MsHkkoSa+rCvmPOeLFEtYqU+fZhgQ9tOWeGm7ZsEiR8Fbaxurb1a1GveOehmBD4DI

gTcLdnt+GP0MqrJTZAjm8yYse8ZZ2OdIAErStluEdoh6SDGKTMMGsl/TYwO1DBrTNZe1TdcQTN1FDeV9

OgT0RQlCdloO57JUzzyS0NRACnP+r2EaHWbAd7DCkN
XCO0GfpFgdUN/POiwm9mgb5iN7epehhONTJu3L5Qkbf1n3SVQSGEnydAQschrHNmseMcime0HHBQcKrJ

Rz9WPH24EZiwM4YFDGfX93mw8+lR7jrRRmeXsPFKPSnEu+3ztp6AOrf6F18KDZAqNyO/Li+auZ/Fjcl2

atQuDNB7nCAktjtAD0AnXXrOuDc5hsXAGRFBNQ4aB+
xqmGKuqGQkhX2uT+Vvx5r/mhJuCF5qzrV2xY3q1Ch2qg8oHHCzVnA+ALC03t5Wb8Jl5uIL1lca6Yhwtp

uWcheWWe8cf2lwAEDP/l8KbyvfJvaBwyic1JPzluc2dvUuI4JWF1KfV0V5BoUywV2fK1sSBN3Zuhyuc5

isnbg5P0qRxTL4KW4akjVkwmY8OORHy4y9yE7zpSbH
FU13vaqt0d9sGrMXhWhI9GrjQHok7tqNO7Nvm7xp3BHwWsxGN9YO4FFEm7p3aj/WvO8Fbx0JxJM5fcio

6mW5ApbKABB5XlMkhk4VR1JDTQt9XzF0zkN9hK8QMAzdR+wgK1jaJwfRvyqGlfkrfAK3R16wzfKmI6Hb

I4rNNWS8Z28tCljgX69ZFjNEHiWg7f6CfA0A2aEy4t
PiiprEniRG0n93tnE01iY06+0jqt/L7mp8rBgZ/MtdRfjvwyPo+sbuj2dMoDqvZn9pi10QFXAbh9gTZw

67oKKW10AX/CnbZrt3xkl2cOgb7vMxC5XyEL73Oiy6zf+HPNr4MBOC094hSPMj+2nKMFvBgS4GgYt2Ym

5v6794QOoc9TnbICC0a3lycpEoYLdOl34oDoDlPBQ6
nlB/evwqZbIyvcPHIbpoxGh+LUu7LVGYrGS1zKHjSVCjzu9tzi55QMSX9iAweeH9+IIltxd7Ae4mXpcy

mK/iJOr3+4C8gOY/qnHXN3jeRWOUNZYq/fWvuWZXidT3caxI/3n48o94qFwTFK6AxdRSWZezP0YEtUYX

99fPJ65yaDvmC00BXknmfA4RES5CQKWjhTZGXMSNJB
ofa1ZEiHXdQlFwsSzvTVrGgKiiVkdbn6khd9LWLalkaDXh579Upzf2oGfGRaTZ4mXKRvFg5+8LxH2R1Q

Mlq3QoXIQjMFVeb2xy+4bHpHSvwXNpEX4Sv6LQy/2p9pUTJqymmJdk4yqdQpW3Gnc7Wr8Y/Wi3AGkNKy

PungEqISNe9QqhGv30JSaj16JoQsrKs56xShsCBr/t
nNpm0nSVrTpunW0hrIynIMtaSt/bHADdCwPNKTvsiSDrO+nSYhy8HMB6bygN9V7G7ACJi3rB6F1nc0sp

SRdPELn0po8nWlC38wbZ5rU93n5kGumHrRXbyj8DIKpMvqwf9913jXL9ajXECzq0Fk54diBWQpKhOkry

rk7NWjLoedVFHlr2SNGbS8UW9TrjQuaK4g4Q65nC4X
lAr0tP7869uDc/H1lwjpN8sdrdstuz0f146+dfq9dohMm4mbnPUcpslKvvDf1cuArvtEJx28x3Kwjk9s

/KvkGXY0jEJ9K+bn7/HkbUJXd1LxA+5Q0YFLt1PrMqLRRedB920WgAOvSwiFlyuFcfee8zNQFfYc/jcv

unKQfGcsaPO9GvzZGmaC3ItRN2vce9nolHAGLsQ3Ve
FJVmPvTZqLZ0T2C6Z+XxXNKs1V0Z+CPucyOC+5Xuklm8v9SBhPePqaN0RSVVqKcDsxfCRhQsU/bqFUL8

gS41XBt2R3QLxSH9flB3oHEioIDPzIFLiJFxDAQQ25QDVwLElJc3hyNsm4kZeoVRD236fDsODyM0kDh1

MXBsVLOm+nSozSJYpva+CuHYzOQwKOQHT7J5tyRRXJ
ATs35iiVQ7/Ujg4wOWA5Z3shP4PFE0SSvSZSGk20XfPOwoDWqCTZMGG1zhOfYqw5EDiUcIQn1UCiQPUD

Y5BPlzwdwb9PNS4NiAOrq1ny8HSQykO7fYIGPlKuXv10roDJPQmDo3OPcHhuDQg9JSx3/N0xauQFHvRC

TSPqZhFwEdvK3V4lYoE5ag7TOKD+JX0RXrT1JOxSJ0
GDu12CON2lLGkGKytCcYFrxheYHkcce9EuBobDwrmoiBXQy/ihwVgI9SMDYA16XJ9m9of7285Ex2/fnQ

5BI+a/T875saXGsN6ixhD9XnEHC3YUcxmTtUUndBXtv7j9xie2oWTpwVnBzGTEwodfXfitSBV+x/3yKg

0/S0jlR7zN0BhOIHLm6Jv0HAOiXXvlcntqlMv/0e0v
p7y9mQQ+4k93az7ChWnHZxEqfleAgM8dQtvt/uP51WjisXXw8uYRvBiYcGXcgYqtCz8edu+zxdvPHlEv

CyajcKcStbgSwO5VM7H3tlbfYBvEEMZK7j2atwnXQogmM7/yBzN7BTsUphJCUW2uvJTHN/3nzfmATIsC

wMz0Y6fuJ6C0vF47TYU+COfCM8d9eFLWZR+/IKsSGl
ae/ywgJmPm6oI4BXOntAA7a4aF55ww2bhuAS06KBB8ET/vFPTYph9CwLXPg1OROcle2Laijbp3fyEVk7

erpn4VxB/YIntdnU7xTMFuD4uhdKA3xIwx5g78ymgox0cwtulQU5sXtx6JlF5qlBgNXrCaRIlTktXMgh

0SokmZ0nWXzPCyiWZqCjVxYSYCV1+UQHvQDE7M5QWM
yagirAssb+XWjtKUS82CAnM9PH5FU3XMQOcJ1vF7Lr1RgArgqYFcAI28xzHDumhQXfmOcVHLLG7DdTLb

j7eWkKecP1roTQ1C8xvkyWA2Il3JKSHS1cbPdgvzzgsH8En8X1GMY2NPYwJe8dcToxk0tRFdY0Kzqlln

EYPBp2Ic9MK03VggwEKp7yODIaF9vqPY/B6b5ejo4n
yobHTCr6BB/H7b9IKubam57Ofxj05DM6fAfRwMadhwl2HKsLbt7Naqzrki4q12Upni4DtIKNc5L0S1nu

2QSLdjYhCJAncRjP9MpI6j+3a6VaZ6eJ86cHQBt1HyDznDRBuJPEX1z7ueBETYvt8TDYbC7Ic0MekgSh

zrUlFNj76nwFT+Y8Xt8O0FS06OkL/aJaf1YGQ5pRfp
WYwCOdAmMf91bcmsIM/OT4BwZtlChn/u873s3KB3F6/yEbm2AlfEBi0PBfHJTGM5e0WNAw0KXU43rHc/

pT6OkAguJpKRNFAMgZkPf+cyeQbP2zy3bovFgHMlrsId8RHCgLZw5zl8iZLB1T22FOexa5Qm0HT2Yf5a

VDUowP9pkAgI9lo1a3ptuQXNd+bR21n25s3uOqcPjd
KpYYlp4qnw8spN1B0YgtiBVmR01H4CwNH55MiUrbm4FFNhgnK+EiWPIpD+l52g7bLejX4k0xc5YAXTSj

7P+mDBhg4l74C5bL/k0ujifdnM0cme39SPZtCsLQDU59nj7uN1zL7sLGetAf2S5D2xDr6vpIsIbirQAT

j0K6FBwBh0SQ/5XTS4BPQo+KHnU37m7uNwSSUl2thC
esoSkZUijFYSRUvVq0A01SU7nlvDCoPknbnpvBjGtslWFqDSe3wkyPJTu6n4fHag7bBz5let9eXLSIQb

/F0MQVN6ZIPV2UwaYUBEZwlrcdaqkdKaSQLMN4j8wuNHNzbcchQY7o6tlFsOn8FDzrHQ+B9NnBgV0IjU

bH4/pxUuUcESJP0aicyrkeZ6X1uNCt+3YgJuB4/O/F
X7ijmfCkVpbVUIFEeWgKyVxvYpPjjLiC5RQVSOL5iyORUPZ85b0w9CumoIdi57kd274WV0CMDleQi15d

ZGMKc1va1YIMl5z4ZwLyjCr4NMGdohrw3VnQUBleKTwphM0fS4pk3tQloaA7L9WJkCTgEhf9aichcJUZ

qOD20oKlo/gwrW9soFsz0tAhJyFrhjVDDo16cZFcI4
cgCYFHmtUAoDS1ZK3U7YDv839sFSkmDe1PXmYOrERlQlHS+63qls3WK+tnaeGcH3gl1OBgwoIC3mJ79k

hu8HECaPkMCP32SMYI1WPDdYKzFG1eAMb5SMS9S4w460M8oMlEYsk15zOUWh1ib+Zc3IY/1hgEG34sM/

u1b7oatqQw1HAzJhfZJmD8ABJ1VxTjiJNkNXNBLp9P
sAdg1pS1YO258GsI8pKSiFZpvYPOEXC1dfsNIcmnvCeRADl56jkgGvcy3/pK6CDNLiQbrE5p18pw2vmy

AZoezCZXMnLEkKKUlcCfWpGLfja6aYhYMy3CDnG9uqLxlZ6FWwnZMrFUzcOfObWVx1DlLAEJh0AClxa7

qYjhOulkVBhwgXXQGmGM4qDnXiWwO8Sxboukye1RVc
UDjNll+gmvr202J6ihzcv8nSPaX96jIxgZkmjNxnAizG3wKf/LocfIZ7RHcTi7Dvszk+khttjJls5i8k

fh8F0zIwQmKwKYS2Olo2yCeKy/Rrqcnu2OjN75S/IjeAyY5FlBl7jnDyb9W2yrIny7eepLkB6vdlRqoF

iwUtU7DoChNy0FOtHLOyiYKFmX8vE7RickOYzx+423
lbX+mIzUtUtoBzsWuXWAPnnihbEZmcmsJ9dMmRlSAiDAaBqi4PFOKLNiKixP/Dn230BJhR+DV8Za2IhD

tIsVKyo/vv7UIt8j9Zmn7jysSUDtNL2WOthf9o4ZiPJcyaFoLLTZUwf4M0VjkWdCcdP2ihqLN6Bf6PwP

03ptaGHad5UqMTrMQ6EJMdFLHLEfwMuyEIRsQmj9g6
X3Yj/p8IdAS6O/rVx/Pieyhg2A9ZxWxjmwBjEd6d2Bc0mYUsuzOGuUxJ58pP8cldC2leGAcFGt0Y02dd

36EX8C/aLz5Mx17w3iLTTRB7jmij5cFIy8hNcQG2MrPM5AZxMUzdpC7dbKl0kz0zw32HOC6Z4ijNMKG5

qK7/Cg/39TmkEjsmv746+Ov6mVM+nEmLuwtJgpIX2n
ZC0HAODhD8q6yiVdgVNRVP2B3zaSl5rnAF1n72ewHv+DRXK0eRlo/JLAkxq5l4cDaztaOxwYMfJCSSYC

v5APE8Kvfi7PGawkvXOWwZwiB0u2LAnaLTcxYC7xmwZEuqmjZ9o+YRgAm+TXuwP4Xk07ivF7GqdZAC7/

qoicPH1azISabQ8RTmUSV9PTwzuyft32vap6Kl2FEG
vUVeMVXtI39z6ek8eNn0HKRC2LqeWhYKi/rHCbCJsPXYFBaswj2Xd6qDthp4SJo+7jWvHaMFj45BDNpT

nT7RqA+fx1vTa1H7E+hlWMHBP8fOO7Jk4FXyx07W5ypLeur0MOxZiiFV5weSrEXOdhFfVQndXpfc3/Bw

Pz9brjjS3gL3we8H+VsoqDWa86RhKNkLEVkyN0VoYw
zpB1va3bCVwKZ1YwTsL2I4GtQSbUMXriqGiHJMBCsnCldI26WAfXdVJwc6XL7pRe1IcgOT20y232q/1o

1iMl/5W9ZbaRiKcFIk8DcRJCQBa6/SnU2oVt+199vJl9COF0ZKHWOL3GJQocRVbWNqyA9kffHtD7gMPG

7daIUN9GA50MGbzQLeLtQV7ZfrZWYcoSbkcTUPpvdv
vXZQg0wj3ByEZMqUsKuYFi2nmliMTSn4SlsTOSqB2RJEowaogNfMSyNflRso84rPdZk0bIthtjFxQxFl

MySZvIktFqxuaVHNsub2I4lSh5ahjhd8+3YBhmsdx7q2WXAvqizlhwSVnRHwK2n8pb2wg9ZH0fFPIBrL

TsCULfD9kPKHvhxVg/kKROu1Xggl/mLFLdjKcKXepp
8YNLlx2tNmKqDe3mGFdtfuZPb5VBT6/D1tfeXbtnbt2sgmh51fF/hNEHe6RaVLLwUl38vYF0aeUrjXAK

Ij/VuAmggPU5/2zBrRlqkhjQLneQAHpmYErr2aeQvFNZNWjlaG6o32xss/XqboFCO0N6fGbA9vIAxAGx

on3+4tyAClEucypzcgdYA3CEJ4A8fEoYIYXehX3y6F
1EZ1bylINo7tf1bqYuGWFSlSh+hrT/08e5Jl11M3lHoLB4+pF5huJTFtX7y+fUpKVSXrjE3wmP3Nqfha

8A3MHKPS0Q+P0qO8r1JHmIp6zGGYpdGgcOWXxhNuvSVTrkyvScXh901cEIIpJlGF8reVjdCZEPS4/tfZ

/O0VySapEWiHWDwFW8QK52TjCLxylgOoOua9BLaCNJ
kMXu/3OpJxu05vmupbkpHqrfT9xcUnkiOrgIptKEcTBRSBnV8/VomM+FlDw2CMRPvLEJNz8Rxx1ZivmJ

nDiGm5mFE7ihcInhNRvSci2yaZto5RWcvLss8nMzl6kNqNSe3uSjgM7TILDaIKxfdNMjQ6bR+AMmPEcc

SzJr41aeWLYRQPw5qyHbXN3l6FWSe+xUi7l7C+O4dc
+Lrp4bBtklVKeT+HDPB1GM66qSKw4B9mBRnTq0bA03e8xtc44rgrZ/jZXHENHAweDRW1Qh28AJb5L+Vc

GWuj3O1c+hB6WTIx70okF8/1uCdPJh11FMEWXmBXIl038FY5SPWaAKzM+CBaArrDXBvM+SOKUcR8fFkn

uFakbOtLLfhXjEbCi8B7X2KeHr/7zWjWwBp1iucIGl
4wiTrg+F1mPI+Mj7FYt/NYkMrkscHloyNqhtosnZ9Pz1SwX3MrmMUKAJ+1T5PGyRy0u1VgS0nxcvB2uB

JgehzwWumZ87ZmKcoiiPLY8QV9lACDDaxvahBr66uS7Nw6GKKo+OkIPQA4tXERNCyi1kpPcOFsWkpMjJ

ABPvDTwGBdoqlkHrq31HXqsg8cGWr1uarOo6NwopA7
3T91M9Wbo+iFxpedDYLcZClnL7T5BoIhAhGx8Yr7kOvwRjwpSY29XAoc/QhYwx/x5qG4id9wApUcbopP

EwEOymVC1VnCv/+EqxpUKVGQ5qckEMqaYKzb/0jkjuo4tLV63ao4uGnDn/DGmArUcjp0Ut03/dhY5yNT

IxfVYiDAdQWuBoIAaRfEkeGdzkLHaD3z89qPAzFPGp
S4MhkYEnTOUKIPj7N5q0MYJgBZsFSGWJ5QiCIyAIMOlknmmxZpFv0nxD2iOxiJsg/alDoZqAHl/alLbX

okogFtvxSCedfZ8MmLYEWKa7ko/WkWhyk+cc8SONR1yUMintREDf2nPgdFI9dsm2v8CeMN8Zr9Reo6P/

QuSwlwjeTvU4xxfTKzU1G9h7TovMgEqShXRHdM4xRU
AO/PRKKSHprAidKyo/G8zHUMGByKP1zhdAayvkDFsPb/oHFnZSKJrFQAa4CAOR68yf1SIl38vX8WVn3L

OU1XBsmo0a/4/dWcT/Ea08jGeGMUfUBj8QD59Tx1F7ro2mYRsjd2JIwkXN87zPn/3jXHtO+7b3+7wKuV

yah9Pq2S8n4ppDjrcpvmceqTwBoZE2auu/GKC3hrLw
ywZLWMifBYA++k3MoWRW9aHt7S+gKanIAwerL6aQbk25yNcJ2gloO4eacW0hSZlCYBCfD2Uu0JAfOt/t

jCW8ED9Q0fxjK2lTtpx2ruwDtbg2okQG4NpfsG3ztcW5rLdKDHiPpp8AIonKB80QViSw2vhCPQG/TB9w

7TFmzi5fgx2MSyOqZGlA9EQEoVJI4WXTamDcJ/hk8Y
zj0c9E2aQlL/kftvPq05Bc79i+VgEgan/i/b5wAFb0EmbsXnwdzZq/uz4N7LKDc8I78NROTgDpEc9FpE

DeFB35f+E0548RS42A6b4Wkv6hNJbaqjIZaLSNwyB4iN+p1oVgC/zQTJ5X3posMswX8n8EgnffYDiznt

LCbjsA4GwQgvQyqiZ2/aOqyWWrkB1Vt+LZzBg64VTp
+FmnMYfKNbKgFl5oKe3suXh2gNBHZ8dhJ09OY92DKexi6RjvTV2EHM/XyT4MqS0Gkk3KO9yKMMqBaCRj

+8mRAEZL5bDW+PUw0lc65JTDN+rTqa44XB6y4wefycygrHPTObXYor0Ss1Nu9pUd+y2oWegMvpclSEob

yp3p1HxOPP2ZKP/ThLPpDELzc+bSVwXp67psvyXg+S
Kzc0Vp7dqpZHKArjVQAOQoIndgoKMO5UPPBw6CJhcDALlW8Xvf8yzQHRxGIuT4exDKKRna62tifGRFB1

HzI7MjcJJQXgXWPLA1EO4O9RmqmtD/92b7i161M6yaLKwaH9yys08nvurRsusAlLOyAqJZhNgQtzTlmO

Rlzg4nBXuuRNaZAGX6DmlB2wJ+69aPtLFwvro6NGiU
uIYdc2U+ZlX+pY1K8CQ5hRxSpLnASaduDvqDn00Seacbkf3l07SEivha4WE2HwaG7r5NTNXPB1lFnN19

C/4HCnXIpCLBks7gF5o06GyxOt/MwcYIr0NUqZiZ2Fy85mIRhOwGrDqtUwcp7IOo/i5xxq8r8YfNTp7Y

lnZ/mPgJWCRwAVQLdZxJr+59Ok4tq6695vFcQU8l9r
3IZJXUYqQYDZHw6IeeCOeC8wshXyUQdM86oSEIUx+58Bq6KrXTfMj+hIyiPAO+QL0iRQ7PNiN095a+MD

m3AXsDXZZHIFQb5aGEWFALtQFfPk7f3cvLjWx8QYLPhWf3qY98iegJu07ofai7Q9GcWUJXfYZ7fD9c35

z6lWZN6HZnh5O/sb2PR8cH9nAHgRKPOKZogsgddwkE
SvDPkBC9x5Yy0P53w+qTkuoGbUSlPwnOIpNofENx0TUc5K0K7J1wl95m8FFYlaD7++kiQ7kKkISACzNL

RzQZ0zmCwKjHl+GitBypzpn/LPpnhOKrkTxi3n5BgF3TYE1c9SLaChwLmdKlAjdA8Ioa9/TxgQ4tvXcY

dTrjB5g6aQ/YmuqWlj99OwW4nwNhPz/vC23qnDA9bJ
AMNKM7ptk8g3UiZfDYeogG6SeTwYUgncDtMfVk4clG2u8smMYD8kghe4iGYdXFyllzGLFff7oJfCzYE/

qvttfy1l08+utXI1IaALNbT46Luh18/gmUAgilGOC5mXEU3EnsvBjRDCm5+kAgHXsAAMj35ijwisGn8p

cvvdC+s2R+C3x3D4HpWnI/354tzfwBe1uYtb1YNE+W
BqNwZ/gmzVaeJTPcrZDlKRC7ANA/08svNYh7G8JQBy9Ng6P5lcnOs43A6L6B8MQ0ALNk0t7t9qag8kLo

N0RgGL7lNIGVTlFlRAJ8LZdgWfqEkGMO/mLE0xT0sAgZQksM3jrLy64kkWBxfn3byyiHsipURs//Ve4i

D/U/PA7q15ICjzYOHb+nV+yfu5XMwCepCWzMlrzn/o
gYu+ClbX/6tNY2WNqDBNweT32fh8LfzIQ/ePQordCMOw1K0IwbBTNK3wvlnEHH/DJwIcjFv40rz+h2/H

TFVQAidZWwT+wQrrW8IO1fwtQCGbv+PLDEEiw+WODawh1eyixanJ8BhTXi3Hl7F2IesCgvOcZ3O+q5HI

T94d8fdUVNq85KZ1PfdUN0641f819RLGPId4b17fqP
9ge8s326Jg6rTLum8XG2htf5dirJHWZL24dQOfqjZ2xcB+wX/6Chf+Kfun7J+yf8r+9eid6ODrniC/qX

6qiCZDWohV4MIiCVp7/z2hDc9WmrtuZFQ/6hyVdVL28C/375A+TFe5XGznimm1ZX9WEKTXxZefmQmDNq

nGxrhrb1qb0rgXFBrjsNvLN2DC3slVflUWLoVLuOR5
Evwyaf/IrLHLn+3jkS1gybWw9K/gaKmJKbUU+etepKjcy8ocNbCJNGe9UUo7OpG9PLIZGxrNf7JUJT6g

25XNYrDfy+xCD5NyKddKq2/gMuHUMARmLiPfGp2RxFce6WvZXhs5dqFaWwRajYfrRjgCZgu8RpK7DFHv

hO0Ymb4gOeOf6KEOhMK9NhPp0ojhnK6NZemx/+ljyZ
3iIcrKiOpMk/SMjd7e8xjAUFXmHDij9lN/uWrzXoGEHt5rmhLFD546a9VQepTl6AFkhrkZLcQAxEJfdk

ICvXTn41VycaiU3JwfnwhGA5/Oo0yLs7ntt9YyfaXFLjgTRIVjZTm2lX+nd/3whzu+iEmxFkGiV4jD9t

tT18375a/hKRyimBsSPSwoi994wn2pspWb6vlU1GBe
1ozGgK6tpXxl1FOgy1xBel8z8E/xaFwK5LgWWsQrAyUW5wDrm4CfuUQHUWIRrRbtcUkEduHPItQbzQ2X

BeEh8/Enpy1yjIMPYdwVLM13YQDJlnHNSdl5gjaPUpr/bLdACkk5XIjfdRvepFiCZ2AlIQOtb9ck5tkA

25qJh+M3QHyArALfM+9hZdYl00n2aF5dt98N4GEULq
5MAmv53kyvheAxRAkwgdc+kXiFp4GferUbqCao39MAD1oMsnq4xGalDH4pCM9ddNzItRNzNo6mHEJQ+1

PgFLq1wdAbqyH6pEOQ/uxipTAxdqDmvo83R8CfEV9ODTklEJZ8T5qmU0tHUuRGaBl3dRK0NroR4CoELJ

qkAHQfd2x8xGkHU19Fh18wCIkr29RERjOuLyMJ/ZKm
tTEc4MkOZWYAU468XKazBjPFreE2Lj4tn0nq+lKo3OVuoCfczmBrk9loeIph50SQPo5qC66XB/te+xIp

BdeoAE/FYUjZV7D1sLDH7tMa6/f3G/SZaQ6KBOKOhBvqff6gkCmaISjzxKZ/fLPKGs2jSv4jOJG+jL4X

b2iPZe4u3SfksyXWmaQjGytFWI9Znh4UyAAopdtTqS
cbRhVaOJmfpDwkcgYTtQ5Hk3ZKW0W7fm2yVVwQ+/6uZ7/pKE0TXVVrfxvJ+zEKFpPHViRthRkN88WvBO

8+X5H5vQTugU7fZIO8wheAEA9o/5If5qnXV/zUp/Wv65DX1gQqGdLEutHAszXnUEI6sZV2JwyQ3bPf4J

QrD1FupsIdnHHiwtWNruHEBTB2kwvgvO6a958VJ1o+
MxA1Kra2IRSJx1v/6KORJH7Pk9/YPWh4mjtV0WbClv/CRAYvDNV9kwqH57ewQN9IV+zUQiLr4rEjZDml

+BHhXi5epANB/JiVO3lqmaqMI6VhAnH+2OmDGxx1Uzlb90TwyNy5EHWNuVJOpZAm3gSdF72X50UoyXJj

nmcZvQsR9KeSLyk3aO2+jkRJ0xHw3XRobocRyYbVQq
6j1k/14LX70Me+NzEvh3SuYv7MbSKQ7GvTnpyLeyj14cyj5bg+JlVUxGRKr8+DcISqSb2Ky9ZczfxhJM

G0YAalkISKtmp/6VjJGavhEz8mv83p8Tgwd6kQe61euUKbduUeSd4szOh5guL7A4LTyEXuevp9R3ACpy

lT0VxBSiEQfUJyeOXCCi7BSJ2eZDh9TES++tvLIjnU
deTvAZPvH2gx9AKtqjn1QM8zitfiAYnweWyLWjDNU7b5aEAGMAOYOOY9b2Z1eWIKIsHqjBN03sp+KhEB

kzMb85VJVihF85EFgNgGoQnxRIbZ8DxiUyL1T0oN+7eryD9vz8MH2xQFHTwtJ7qHdiQYyTQuBo2Kwcjo

v5DF92LTVA3xmB8np55RCSu5FFlpdx/NQlE3t/xWne
23RcRkRDAGkPoTi3SY3NPXZSp9aBNkds24QzS13nA04DDAbPYFQ//q00/geFNW5bq+UPQ+dDg+tYy/cp

kNR5pUrTI9atN73l3u3NM2jTy5mZxYzlKwCB/B+VZPSfPNgXtG2BDw+oq+pf9aazB4glZqhPDZqHb2GK

urKWCdpVQJtIFb3hWt2txlZ9parFX1x//8DUxiJ7gJ
bWS6xfFkMhE5WlvXzbElE+EnsaDvCZYay6dtUE7eLEuO2wG4/DAUnCTDtkzSylfrL7Ns2DPIWt9QazYz

xvagI/HFX/GSivSWQ+Hja6OlXM8GEyz7a7CIEl1QEB2dse7d4nHb4sbX5G3A+p1+NJyxg1BuBZh/faOf

NiMvle9kqi0xtRjWyVVcfbpRQJrie1FyPoWlXjd0Ug
EqgxUd7ox59YGfUs5cWzlSDN355GyYJKSaKhveNNUiHGlJl1EW5/AjnwOboMRzPvd3v5juSnDjVeBj5r

fbsSwJAsFPP1zYoI1hu/mfeQlzi5vgESHsdu5czF/ACk2CUalL5yJQJeZPmLG4rBT1M/t88KQUy2C2UH

WGMfMCvj6A167kABfEL0h78wMNYlBW4h6fdhwunQlW
bfiQoBT1TLykditSlauir5C6y7gZN+hoZ6hK0ILfOEGkb7Fe/cY3aE+Hf77mbmSdBG7GZ83DZyIXrb+Y

[id number]3RGhrFfAt/RRfiU+TulOiFyUe1ugES3hPBB57jJ5BhBi2Rblmq

BmlDHdoOFOh8neYrby1eDlkyaFhv+11gsDmb5lBqKv
UFTeGdBQh6G9AGTfwXVsSVJg0RNN0pf8tLOF5vOCQAHuYRkfk0DjxfOfFQwFvZTbp7Yq6b3z33F/rums

K2peRwGjZSDkU55WfNJzemxr20rH82H/77f7HikWpvEpaOcGa7NTVuz0ac8u+jkC8mxligsAHLTE1fh4

f1w9nVcdO/STERJR2w1R7Mifh6VuF58yOLdRsf9XJU
p0qQE0WyxAPFIBPU/NevE3p9alxu8HqTJ3sKbCi9xl4NYEPvLrnMF7Qb7BAco9eVvc8D2HNOM/grLlC+

eHjvaKiDiUlQl0ofXADa6Y0NrMMj8cwnmxlmn/v4s5rR67Ys79f8DMIzuclto7JlW1wz5Q4EXrVP3Xqt

lau2w967JqyloyIwq/EFyMqdqUl6J/x/QkrvU8ZsRu
VHim3KWPUoVHWCLt+yvp6jppICMhzyc0JmsY6euR31nS6BKpQwzBupQpGVsvGg4WAx9zgiu63eRG37Y3

kHe9CSZdpiisx8z1k2DAGj89pHLseQgNhjNFzZhnYS6vsfGX4+dtchhYUdn/AOaCBdO6dsVHf05Cwlfu

DjHJc+R+CttBi4vMoYOSnV00LKRl7leSl4/ZwzYC6p
ps/3Yr+jbUBHRY1bpZbgXH1erMiOpsARm/cFTz5mp/4/1TM16zugetaZa4pvxrEpr0wOkIrzlPQUUZXu

/I4DB6c/iqc1cxwQ4qCmL0k2fr46rug7bVWUqf+1RZ2HMHhqUsCDOs3MNmQ3fLhWUYYafBLI9lF2P2uS

nWANPh+t5WGsQWkHaZVqI4bIizWIx7l3vh0lDT0RWV
F0tFQbd77RIAwKvEp6C9cDl28AhSA2wkmpUAGViD3yc9qkhSENZhhmAfEXHqL36gEBBDDiRI7GMrhQSd

0YI9sV71bFkQE/pFHDx6I75o3UKqdmk1zd0j73lZBNcbiCPbOzf5NZvCdEXPykVWL39NNfXl8JbM/uvR

L/6E9j8xwLERrD1ntq3gklHjOmur2/BZBoGu7uh7h6
fSECxtwlUhufyRuiNgve54fFlHnFWh7JnKSviksI1AZ0nPIFm/uuCFuIKHhhomys2rk5p4vU8CJCDvSh

VXwhHksguzAoAEwX59bWinF4aTKD10vpoUSIO2umhYQxiD9vSd7bC9+/hvBM2rnyA3pYBrM5Q9mvlt9y

BD18xwVsJaHU1EBaOt8kEDUegDh/bj7sbE32YEpwnv
7TQpq/yH+tyJK6OuULH/I4slgeAQ3hedXWFsCdOTqBC5DMwFlkZRLGfsGilhvlFxZ6N6fss6hapJjzhC

Y3RA0PYq5jf1/7rVpaiW3Lxg7xnIwA15nBRlMANEyz+7yhrOw9iRN7c7VnMZOuWTnobRkIiRl0RZPuUf

RHp+x79uCA0B46B78xgzsjEddPf+jkVn/rpyTrkfi3
2v1roKS6bjoyMQ4OVrWzT9nnWgrzuh3zCzNpCsliQ0/XXAVeDDQyRR0dQQvmYRySuaOWtKWtij8qMqKd

Q3GlZ2msNvG7ZdlvavL1TI7b01+BgJhONd4HB0tJ7ucH0UsN2CERx9GX8rbJBPzrLnkxWvWpFOZ4kLsf

qqyFEr+bXeYWqf9Ygfywc6JbrkesL6eSuwxJxKGwOi
eiHa3u7ITjJlawIIW5zinyb0aKGP8LrnNw6eQUSoLfhyzrdG4EB98VllBMcrNgKYPJTxQEKQvZj67d5Z

+oVtRW+d3qwEsvCDB3MaWg5knO7AFhVQb00Gfy7nX7RX9ZJje9HwrcsBlT/rXNYux3vCW65Wg4+3bhHa

nfZFdyS/jmKcwusMSSRnbmTqoHcOiLMjsq8NG+NBIs
auFFZf5EUYdOzvM4APP5psIzb4NLF/qx2F9ZnZBZs05pBcXDlxii6AL2qWQVkgg666n2/hOwngRO0Biw

qLk0WOjJFfC8hGbsCYOE0lPN+gXLA6GL8YL/IVFGBT8D9Ysxmxb7YLGkYLNc/rIAUQDoZPQhxVazjSed

jv3rNo6h5zjHwTi24+1+7biON7oWZQApE5aPA7k/T5
1klQOKb58zViqkTfiKlMmA6ub/QadVUxD+en7oMl4eZJhu4QY+OhmNAgpNV9PbrFiECVa2Pzk5b+96oJ

qhwOPrnY5xZetq0sggy7cs0FtV0hZFcEX1Hug3I+NRQyhlDq/N70w5/u6CpzN6Cn1OuhHqJgZKnPjrmD

wHch759YH06Sm/xU7V17b4oiH8ZCEYIRXH2ub45VQz
hvomwzSof7cz/2e5DeNfn1P2+MWs2dilZDmJvOBqnzthI2MVMzhygV4o5tpOCN58XH2N+69G+PSWpUzR

d3YYmku9UevZBlPK1lgXjW/r9deULm7WJbQ5Bzn+5cOVe49TSiyTMbzdYe4Y/Z+eqNgm468tSYh8yfAL

Ub7I0JN0QVYNnb0U8hegZQHjz/cc8iOJMUYIEVogRT
31gkuPkaj3lFyvq5pIimpRka2e67xort+BIdfAiHpLOavC4FJbgS6aaYB0vLplW+pl4lODx07g024v55

UL0revLU0ogxTdbSNTIEv1ZxX56qFbROo0OZkghjazY7LqZPx09PJp/EzlqwBkvxsUQu5HzcVYlTqDcv

D/Bd+Dk/FrCeASEar1Uq9j8MKgjDHimAkGss7ZRHsc
6LmooTJN7PBhGRl9tSByaqv3w1jRrE7OgLcvIByee3syDMVXCmPPsvSb7RJC1D8+/TxmylmLLPrS29f6

0uREUKbUa+K483pP05DRwdHiWWp1mdiMIsbmbzaYWArChrfSrUf+rsVal46YyrsyAkP/diJ/OWnBYbwy

t0h83TMwBVoemDMu9GrwhnGOrjIFUZgk6sFcXnUDiS
y0MpIm8R/QfWuMu2D8q0T0eRrymLt9HGnWkFgFknV3zwzKgDcQiGYhnbpHlgpTq5YaLJ3eyAK1+z28Py

EUKxmVP44GCNfx9yA4cPCdTnDLhDOkuVKEQ/f6oVdIs6pqbHlRRPM8AcLl8NyeIh5SF/WsoKOBnwWTTO

ami7vUIudkYRzxNizCOF9GBgLxzcdYRBfqNAXJNmoQ
j+erro9iwVb7fnyc8ZoaEz3GjZsFE1DvE+EWCqApV72O5bxi4QFMZxYfkNf/vDsSwh42PnentnJwxN6U

N8L1zYukvGfZ6aepf5Kp+RTX9G4t6I+M6muoWHZRzwgyHnkFyWBzqzCHCtSKQqA+meyIFGm0XQ00Psqs

kjcvlB/rOZTrxU6JMEcoft69xXs3iChcTc96PMKdN8
r8xXaxIz05PROoZPOImyScWGu9hT3f/6GQe7xK+eZ5T3OBBuPrr9ZavKw22WxRAuwAnh6tYbyDJ9Zfx5

OB3YSHO+awZTnf+GY+U3qPgPU2C3m0KUa5DclomvSIrlyNZ4BFrrqBgTUx7CqR6/b9jkacfMUPwqhI5h

ddxXrPDDC402lnEmhDCon8S6tUsdsSOtLNF8psQoKE
TbTccYWAZkXzjpDJqBSnO9KKMigRjR5M6NrXUhglBA3hkA4jfedvoja4uPR2cq3JaZOB3LCnK+TDgdY4

20VToC36BCWmJOjTQJMcHgPU8cWUFGYxk74zr1RCKYGdFYH9v22jdw/502Cr90e8j2KbPIftX0mId0ZH

F+VKSaL6uSUGio5y+YimroQDO+rux4mrC/3V97uFeF
rXfroK8n2kWsFVLtTsypDA56y3bB6RHTC83AzA6+gPno0Kcge0tgO+X3pK71V1o10gPmn4bFEo0FQua2

ns2mDzRqyxveAjufDohT8Iacg7BXn2SaoLekhxqUtHbPM0YrdSYcb3FgMwsSaqKpViBm0Vduh3PCHm2t

pLZ/0/iqMfYECC6UV03rxuvUAi26SnyseFpue63SOO
Eqs4H2+kgs2GdRiavE0xSjcgUjQxMjwsaAti8d3W2Gz5XCAbkHfyUwimPpwDfsQgKoeKO+HBo7wxk2X5

BDhL2YmZ9iMx9lm4Kmj3rSqUkzBprCh4Hk6QCR8VVDMgmwh7tgSZHxg1A+zc7dxDJdkO0OUy7qZxyoBp

YFC4X3SO9aooXkQ3M0EOrWhwkrNn2PzA7fWnaLcxtR
Hfd2sCt++sFoSxwrgOFOcNrl1LcFXM9URhaCtmpkdKTRgwnZlpj/lu9wXoNXmvkFarOEvVbXakvjjP60

iVPUSoWnS+eKjq/+rffxUnuVtQDjYAAIinvxzZ1IfOGFXwQkbL6mBdUheue9Z7YyBnwfxYhUHHv/EBDc

zOfH1qQNmNuJeWb6CHyTT4h7uRDfzq6bNBqkPVPZOo
eKqgKzr2kNkGFmRs2eU9OvTNJRybYUd6NrS4xyUJ8QNfzfAHisoi+oGRt5Vu1Bs/rTMBxuWIhPbsHYcM

SQbeI+X1Zo7Q6Wy8gwgdZ7KBsmSHz0y3U+PXYOAOUobQBoraPm09NlJXxKzkKMTbr3ySA8G+bTqvOnE1

ZnRuUu32+zwfKx8TGaDuiMTL5/Q55aN5yFOokPh47c
wGm5nyu8XJFzO/NQvneKkk3+01T2XMkAI7016+G9XtEmzzYJwvkA2P/v7iTBrxoI/naOMloA5u3bsd0g

CJeZH+pDltcQwp7QVO1Gti3JnonW4Xu89ET7X5oTm/I5d0q6MjabNgo5V2Ov0hsv+Tm/ZU/4t9h8BCja

ydFV5zqO5sr92sPRAiS6mAubQu+U3ijWMVopHTQZwm
DzDIoKG+ylgC3aVrtbsawFvcrQuiyA7WbLlJ11PMGMahr4I2Ac4AUsn7G90FLLjE+uT/JgJjxaniyj2X

NPptrnhUb21Y6+G9Lsu9WlVzcivoETXYpiesLKs3aH+mHMwvcenStF1QjEKrQxdBdLdEn8BEXRxFgwvq

rI44CaIwYxOo+/oiWnKc7sXADNzoMkTDpWIOQiW65u
WDnbhDwqbbIi41Qn94cXbYrFHRG/3nG+U+o9gdig5XHefP7Vn3+m+gCIlhASdbaDNrgs0e/L+kOuZtoj

tCV8tYDlp6NkYWLNfTKpSKdyRKkBc6Xc6LNnuJGQKxDRdAzy6GyoznP5tZSrsn4FemaUd14NeWPqDO4t

aFTN45DVv7nGwZEbT69njdmxS0ZP9RMjUXUA55vRHk
noR1rKTPp8efr+V/IMLCgzlidonRAXiZpylOZlAC2vbhmuA5H82voWiBULrsNnphJVKkDlFX0necHwnT

BVw01ZO6+ct7NO0LChj+XFQRec84YymZyPrXPtMRio9MG4Pn4VJ+MYtzDWULsLgb4nSwKXpjQ6opwTGA

wKJhgPi/KfTNopHBfM/29cfM8O560vjdX7WjJDZRbz
NssNg7DYKyljurIVB6XFcgpNG3fbWeVNIl382V5Om+K+O38OjOJ/IXKl7dqNUuhxHJx9QmDtAjiraIeh

cMoEaoBP8iZu6jzajebLKfkeMwWvyW8LRjuF6awfKAj+4ixZcXn7FfgdwLh84qzHH0eaNolqHLO0X368

AIbHMxk5VBGUYMvuvF94y0OJlSdAg8glB+TrhU4Ep4
DjSnWf6jBhNoitcHs9t6SEFoH0etK88w2JbWiIUlpGOkJtfiSgxHsxe0pKmLIlI/+GizrbO4opiGaHq9

WWTons0KOvNUbq9Lt/X6hkSi//3rJcpcIAHbEPVmeuwzBtYr8mH72BFBtp4AkixDDXGRvjxdksQYU5JD

XwzdVdNTO5lczPRzj9Hul1Fpwuh/lSHFfqYH37/KHF
EB+hPAunxM2n/c5r6fjCf3am1nyVoQbZk1hD1kwmjzj67CUdBCUvMFnGqyXe2WKle0pJT6KrV8v7+mTs

mQsum6kSlg1Qi2EsSppq2QeImw1Qf6Ngye2FuktoEDBEl37T4KwQ7KQxso6MlWXCXxgPNC4G8iMPElqX

toTxZeffwcmj/p3ALlsve/LDGglVBpbWOdAiIlLyU/
jIk2ylECoqUFvbT753NxN77KAb+GOGdpITKbjiWIT4o0drEjLsbsKYEoAg8VGLBqYF2uOBKDPw8vEenQ

e79l9aW4pJxX4svSt2T87TReIvMqKzSxAtjgg+cKcwdncHtyuYHZpblP1h3bRRi8sditZZ7r5oNLeXp9

CmfQUvuCCbsQidtdqSKJtexrsHDqZGdLliUMk1sh9+
Nfh0Ypf+qdiN1txtAi5pospIWJnKn9LEj/mi8+Uo4EPcFSyHnB3bwQJq1jQ5eugqNxLOmXupxerw+oYt

KAvPwEieOvKsjI8eiR1qAuPdRXLEq0TXzLPnVSq84O/ImNpcE0blPHEEZenTesydiGg9ckpZk0+iEp/T

MDfx0H6T9gDNbjC+AsV2gpfEJtEvgkLojOV2RATQjE
5O9eZukidoPjHuChgfZeZnACVg1JhcgL++kmPJwmj1e4CdADbxVj6RuxSYXsXHVKYre5A3tVfY6EUX0s

S0xnAO+HAxkcvPywY4DSr7/frIRqM+gZvQUFmvHhzwvkmjmPcVkp2dbxThnxoAejEL5it2gtdtMC/VLM

5MkxwPiJ7UkhrfRLH9ycF0daoLblJPm58vUrn+dNQU
t0/C/eoSq/rS1wuZG81Pgwx/1f8Wf2xSWAiXhaH04OUl3fxt1ivWiNdo85FUEBrwTv8jDBa2Q+04OH9N

hGLRHA55UCNPjvPhc0p4CcMgUn3T224vtp1G/pSQ59uVz8v2qqdr2vcz3XMtWdjxd+ddUuBZlWU0U4ip

n/cg5tK3PdgjfdLmRGQSo6YfU8INsdbwkHy07EM/yL
CJ10+liDCDh922Yo0BeeoakI+4qzSsYp6TezbNhtDq9S450NSJfxndzHhTRWFKxPK7KbauVwIOoAhODO

vpRaQhlTbiVgk0xdG7/NAAb2o2jKcMNJnTSgWxsWQh4KReuNYTC7OYdvL+U3jO8TnjViEI1s1jOUlEud

8MLe1IbyjqlTEzDhMcnun9R7ogshGxsKqrwCmPwlDq
1wmma+oMfnnfO16QKZnUeu7pkxb6t53W/QskpSaxlSvdv1Vjne/3Vmx4KHYr28cHyMF81u/aPoDJ4Voy

FRGhI6DCjR4dQW+CvKxoWSezblTDsk0TVrHMiFj5e2v+rzNWBVWZoxFVO0L7zZtIs9GWEJJdqNMhjBRh

ELbI28x62qmb7rWQyv38cCI5sy6o/i5ZKDfJNqbUKI
AL7412Lao2xMT+vqKJH+hnjltz99DTKrp8sH+2dYTbJbN9YElwx+RAKFwYn4w1B1248xGKCPAnk2blD7

t2/FCxk11+0kj8BFljBqEniKfOL812wAZrH2osG8Htmzh9XPh3MDWtE1YvUgphVXJ0B2mPRGAS5Dv4NG

AJaqPJZ4q2udHxDH7U5l3mN2CHIij5scMI+0bDNnnh
ET23win5XKsIiOHTYz1YKodrYD20Ox31ASFNryVclpFZ+31LslYI0Y3JPgReAz2tGIP+e2/hQdO2lNKe

gRxrOKDUstXM/ZL0AhL8BfPEc0iWkuSEoAC8ECL0ZI9OPJwFGEH7ViCqWB2QIV01h60byLnOBYzWeSWx

Pt8PlIOWMHwdVkGU49T2ZQZ3wMZZWUzHpVT+aPGe7b
ZfiiCApWX6pv0ooHNc9BgDfCisa8k9//yJmP4Vo1RMaG//EjXISo4jjpH+BoPoOnolY4Fzo+P1e2qNtm

XenciSPAG4SnD6oLBohaOT1RKLMhvWNjxlS5vCExnetQBB5flZ6wRwgEdof0vn8cUMmOM2eDsUwKVb3H

n9w+QbTXnhffkOocF3+tpFG9h/F6n3Fe0kPhREQJXJ
FTw9d4t8/tlff2yIIGTVhKiUbMl6IUqC3tO26qMp8kcv1eIu/YA/ZR5ioK9AYEyyTWk4a7jHbhd6Bz5A

3pc+j3HB4d1uCy6Bn+uo/uJklT0dITEZF+vP55/afXv/0+6j+Aipr3Bdj8Zigf803fJ6qX4Zb2Ru+PSJ

NlCoHDvUAIELwJe3C/hrFoHUkup25eBs2bQ3OBaJ1r
tD7jGFvlk2ocfm9y2+ao47dtG2EliCAb5oK8Un0nZGtJZ+WeIL2ceyUn1mdUgzTx24Bw5fiMFjwo+MBz

WbwflS01BcBsFJ+UGFFo8wmBHCGKKJbRsg5WQ1d0juS7CD3/BiDV1lYvof/f1Svo8+9llIaxAen/Uo6X

zF+BsSa6u2u208eWpZmwqK+EAfoeAYEGio+HZKY1xG
/4Uf5yOrNvlee8hD6BnSWXasuPuLDy1cdbnRpCUfrq/B/E1oY5t16b5WUcQcLByR2cGcEeQxPHX8ORbN

Ryc/bvOAJl/Acn3ZXN5K6C6t+qpr/V4ppUJLJw6C+qsvzHagxINNBk/p6jzN/Rm+sCbTfQUDsrrm1KoD

slUFKpAPGRgrJLkyM7FiuCjzovrhdXt9cyfSCgwdWu
1lAhjn+sjZIxWOVyhscg25HapJwcqjPtL8aMrE2WIkhKQxwxfpkMJErlidLa+Md1fKYM82Uku+nxmoeE

fLgYvfyXySVmZy9JL8WLxddJG2WynRZzSO8omgQqWKkvaZiJg/T/lYdtWF8R4ekaMDOvUhlnqsG6dOlS

oHw3tRpuxLSHHeakPjjXOCjU6VsnXLBHYLLTqZXbGk
PHypLtuuE9k68/9jnj++20b6984f384D/zz/nf2rItKr+kdYrBGUCDwODbZBTNGVQwQJ8XIIHpJDo6Jp

KKLmuBz4+YTdl5Vkz/XWW63p659/Ovy+2/w3SbnmHYKf8Mll81EFxn5qTf9LWv88ASPENkmA7O6V/vFf

K/5Tv8K4TkiOWT49vZEIzxpA21WbnfKtHflUw+A2QK
/015DfkjwiWqZL82iq/IR9L33wUEcqzsojO2PX1QS9dMNYUctmrB8l/dnUk0Jm1k8E+4pvxfgVnfIHDX

RerSFQBiTMzISHfw83E8F8kVuP1zu/4mNXzMt0Mja9V5SAji7vJ3EG4ESW44tRCNGk4d8KOT+c+yOX6h

ftYN4LtgtxofT0K+7iqIW8LN+Z4eUKmEDLfTW5Yy1I
6r3avUdDMRTL+APew+HQZdXcf3NOwNwwVpW0jgESU/ttBnioH59HGklXaavCB6cGeghKmDs6M18NlW2H

xRjuFWNq7afC7IRFvA97YGTz0MkUF3pdRdK0p+y///rjn5/0Ugy3gvOnLiqC9NWD2/QB2FUNhx016SC7

wJkixG7pw44/UtpJOJ+tl0MnQiAkZeomobQal5a35g
P4R54GK1lsxIeRK7RcvAY62p1uPjFgB437Ysiqd/IW++TPhlGAthU2EAz5x4MZ01au9maP+h38SVnrfO

kLVO0Cshu641b4wYY97pjP7R/H2Zv+jZmpk7xMq/xG/An/FpzbqVxag8UJ7iG9u3oPdpmk9/y4K/LY5V

7ynrjaz/t2MeEEnetdpzKZrA8iok1Y9GUIYN71rY9o
aCAtJsF1lcQG9Kal9Ke2Ac0/KV4rfrnqoDvnZYNTBaviz4QfCbM9AaS2E/JUt+d2YcYg6/qWe5cgtUh/

00+Pp3S7kiP4o5NfrF6mF0bEjK8m6n3nd8VVuan/9fUf3ND4czbliGzqykjfLed6Vb9jynMBbi/SDoP7

SDG1qbtcCaZx47jgekLXewYghpIGUMPn4k/vFWCPJf
UXmTUF+ERLUJkXOi4Rr4t9m5ohcrTLLvMhPHDHqJIPO4kQ3ClBEXxMfol/T8++PXgy9ke2uY/1/Dj/81

jlbJ1DIk8UGlcdif9ttvt1CISXVjDLeA/m87Kt5RZ8FLnhVRfOK/dZ26b1k6kfQIK1Td3ok/DYg98J1p

63aL1pJTLagC6Lh8cUwffcWBdk59gGk8AjpTvcfumO
oAX91DlK42Ban03igOKXhe0YJPpCMTqOVLAIGTDQG8NtQDgq9g35nvEfiy3Wj9q2LhlUNgTCGDVUosta

QL8dxrVwDxR4W8iHRYNBVWxHoYg6W0INpdBL0btY6k7ZNwI4v33I2/3twBK9vX8HXqk2Oq/NqqyaJE/a

XTGVQCaROzezrlT5dpeit3rBRN/Pb+sHBndmfsXAqD
Oefrfax/TtoVsbbArublZ0ANNHcbcq+cIKdgGGqXSS9w1X15bB3kbFY7YEuM0S4rPmPxqxhjvtVwUCnP

4awG2yB3v7UHwizAO23u/noI6N7w3+arclOongHFk50QHj1/jr9xhG/rC5cHd4ESpdeIV+XR8R7i3Std

ojF73tzXqn45OuS4/ymsXy+SfrXKCRGAOZKC2gjEYl
K9LXsshuSNqV/zLqfIG+swGgGU1vnR21Uj9b3HyCoZOHa/wQtjv3dVUE5NZglBEUzYY1hntnY4V45R2f

Uv2dBY3LcfA80Cq90x4tJ8G4311LDUcLYDFctJS4CGGV5+SjA0J1HmFcFwE7c1TOn6XYARS+rZKZfugO

UzFGgMm6LprDxf0sdj4c6MbSDfDyhcFD1+l5GfDuQl
fEDblz1k5mcBXDZkX4C4+zpIdklbzzD1pKiK3WZPrIl/jaT227Qy6ilK6P0PAQORCjUU0Q8JqR42RvGe

7cUU2TK/q+46xuce9p6ST832xl6M+XMFtSyxX2g91kip65KHr32fAhed/N1HZhjuRZ1BsyyrbQOu0mcz

AYsbMx2Ro/Tbc5C3JRZMD91uRxDbTJcZFLWib/nicU
5ESRhtgt+5u4EQf+joMa1qD9yDzlIiSvgApwEaZE0cxepgUiC7bCxzw+r8bX4Pu8CW3T5ZMq1BfHOaJI

VLY83QMWFTjX12Kn84e5l2G2fHxz7G2omT3PqgKzzZnURb6TycsPaPys01brBVKePiszJ5QW6eZnNp/r

3PUl1lSSG2a5QHozmk7f9jab3LtBzSkXDqfbvsBvWB
UHml8BlgbtWsfVYbvqLy88uJMJGMk1Mps8s//PU0QeSG89qsDV+NJT+idAn0+8kAukyb8cOPDLpG+Hvp

Yb9BFAbbPWIzG6jVP5y3/lmfb6hT9kmdMtY2yml44LSrpe7ZspDCkWNmFz5bdFNmo18F6rP/TgS+mvSA

DBWrGeaS8pOC12Qa1DnhiO8kw75AJiR4X0EXPzaPL/
rua5B2JKQ5JcKmTRVYXSbua4A3PMbW9rH7tuO9xpMtHOpuJrD6WlbKbOQQtuON4jLwVl93D0QsQsZkuc

GnZ/XZD5k/6TDx8SQNXQHrkkLVv+5FwMhrKUpTrv4BrywJQgnLixLqLG97hNSpIkoaCzpw89BqNcqLlU

K+GuiV7P2wFSS47rAwQgiI3NClo3VcVfDGiCEPTV4E
j3m999q9DkMuQisbc4+ahhST5++0oZ4Godqk/RIsoIr+a+RbetFfs06v5JV65l6tNBNXLiK43rWrwKMs

Pjm8kKwIBju7FuVSt5L1oS/pMLZlEetht1VxkyiugayQnxUzFnPLbfSClubalZZ42lj6ksPWcQeGsYH0

KLJDEQ6lTHN7hDDDtFBqJPf4BvWY767EnhcLUFsgAg
JN+ZpkmIxUXm9JFwn+7ow3KU3p+nul0QHeUquAnj0PChpxPr7+Mn0nfYdEOFH8aDruKp5feaP37/mme3

efoze4AnZz3pTr7pfni+PRGh5m36DEiilX36HgsRn8pB+i6kAqRfdUtYcvyFFyM3ner8sBpeePPw76tj

OsPcmOxmqJJutqsVkfpzs5aNRjtnahkZ5A0IZToQRF
Mz4d9hYcdF1jfXTGK3mvCoxapQMWpHX3IEI7i5uJPmiwaTPf2dGWxEIJ+BAmtq2uiwQkreNfBhjcUhj5

XQaNFy2qq1FkgmeLTE8m+QZ+gY5cBnIRnNp4kPN4MpbBftFyZtluQOlrEjyf44CN8TyGsAnDZb6zJ2FB

MAsA9rHAKdZOfLyl5iMeDvALfM0aYqSTLn6yuPIoW5
flKpmA5aHA+7uIxjYC/RrO1nwsDGm21TNKiAGJuUb04NAQgc4a6qdtcaEf9Grkh2mX0cm8Q5CCRewiHa

hvQoBegLPqms6f09lx06sJdnwgmcwQuPTiEfTa68If/eEjmpyvFcMZMSbhtKUq9STlbfM5wW0EeBxqvk

AXCLfUJtGnDnxszhXkhuM8JME723WQEG/BzXFZu43O
JMr/j2+F4e3ucH7dToq66wNMuWXxULk3zJA2sQSuQB5eTksDjCWVtBYP+1stNWxVyJMW1M5IanXRcLIg

fDaPeFq9qfKqtRSX3LHhS1LYSk6yp2RpU5NQpgLqP1vdPBdbeTtHcksGi2qcHuIZByDFeqrYuLA83Ucd

gUbqAifypoaHEiRMSl1cZ2wi4Niy0xwqLsMiPqFX5a
AP8bHqk3y0IMz97ndkrcOBY0c6VGf1O7XkWGM4nJrDCmgYiycnAGw5ubdsAhJdB9gokAzbXcDYHKTJHA

tA2fSVLPJ5McA6QwmjlkmbOfe/u96bkQShOgXpYe1+GWBPgfN4idxeoBQlka1mL0RvDJXEXorhS/0yX4

Ukm+xiLllP/yV7HhGkDY7OXSeGUZ+RtZk54JMKYn6i
tH8QAurg+Lx4wd/1SxFykO0JncD2nrMDM9nFdBHpg9cl4Tgi18KZ5Dc54vqx8Iq++lfvpbqgI7p4SQQD

fNg/3zUfx7jaUjtpMd8Q4jVbel4dE9VT8syvABa66J2o08TWf13zGibkr4tdnKf/QzxWXc8v/vEFGGMg

8fctMHzNWUblcAUrBktG1b/9QE2qwAAG+2H0Y8p6Pb
zkIFgQ9mB7dvExP2UUQgIygtSHGdIF7VbNoep4zwow9cmatV2gjtTxCTX40rmKSG/MleRk/RrzZ8aH8v

1DgG/ooMvT+H8PamZymhXNlxIEseRa3c69OLsX0eAkDJSQTQSGdIGtvgkUFMLdoUiO13cSFPwctAze4i

UBh0Fe1goeExmV7YF3sPHC2eHz505vcLhqGDv61LAL
amHYRw4mSGJGmct2gD5Ij9t/jce2TJpkXxI6qnS9O9adgc7UdI8eNxSm140J21bVjEzr15FKQUYnwYyZ

1ZObUkztZ52PF7pFj9bFTwGcO/sr+oGjvHS71GNwP+bxelKUr7FNq3NYEoSwakmVd0KVdUv0YNlDEq2c

GtMhovMd0czGZGgXJTPwPBBmio56pzz3YHnKDpC222
3JQUWYKvCNMIngx/1iTeMMYmYJoYz8AfWyPdJVMwvxYgHfAF2W5WOhk4QN/Pqb7hyYiHbLt+CMMRVE3H

rlMCF66fxv/hmPiFNKthz3ASLc7OieSa+6lAgM1RXq7kDfbcKXjVBDMN2fGLhBMFniX6Qt9sGQ+XT/0k

a1/x70Cq4DePkP9hRiEHRNERzJk2wMxg/LSksZI6jh
YY0RT8XOmJW25QEj7pRfThbp20Q5mABrVGJDQK43S3Vs8Y/nmsuTBia1hr5B48snrWK/mkQAQYTxaj8g

ZnivbG5VYBAf/Z4bneCxUlUtMWL42UFwmsecJargKvYQaWnxt6OLxxvgF8wSmBW+poTjEygQIvB0NDlg

EUhroSDe2be1umCMLRBFwgiQC3YaZ2Z6exB1SUafbi
j6hBOskISNKg8wQzAESSZHehLp0hS6XY7ZxZEcwjHIb9bDivxnIiLpJqIbpZBTUmJ1baoYHo+b8+y7ON

eMOB3bOdYKF6z0GjwYJS7Sy3wcMvUrOCjJEg3xQkqHM6vjLPUDyCv6X7E/ACQzXdjElDsDlUBNBdBfXe

bS6fzwXGVZvJvDq97RsJ8PlqqdwsXTVkOp1wiaZyht
[id number]+8eGhF4J7VBzi5ivLSWVO3uTOgQ43gAElSNm1Vdel7DY5JD

ZLZHTBdaoE0A4tc+Ol/Od4vRr4ZvjG1B6aggXmru2zVXpy4tY5yfwmPAmOzlQc+lKDvI3IlIkHDX4QNr

LIIFOXaTF8vziESylm5Zj+Pw0a4iJsYWzxgZQrRX1O
90J2TnOwvwuTK3F57jE4dHGSPORu0fOwVkKgwGk1/SCOstS/KtopEYDMQZMaIt4S0H3331W1ddSuAlCd

f+qjuzJCDviBkPA0lbcMYguMFHBvOj+n5bxHf3t+ql22rY5rPBO6UCdpcqVgyswvN2SrkmnfJXQiGdSS

eWWaAgIBEvrpChlLja01pdnJ5s9eyuHtZMQtw56Lal
dfs2TR4v5rAzyYWW28MOda4SOXNUefRNkhMvA24tN5Cq6u04TOYVHqRUTe3MG4HwLwpOkdNJgzRElnLs

dim4yVvaWNIwevTFzfKImEm/C61HCyckwmCfIr4wZDtdbon/xJ+0mrLlBHas8SiTFkHRXxI5zGF1oUZn

mYB25AMkWqS1sd0AZ5UKz7j2v95DKwOgwndeiQQ/Dt
MRdb6/UXPrrM4e3Z9tC+EbnMQjh6omoBzajluGxNMKKXq6L2NFalqYlkOxfDX6yMArFGgQafygZXQo2S

noGTzLcH436j2AXzbioREp6oNDwi5d/Oifoq4K+H08RKj8U7VZUtvpITBdwck18IkC7DXSj6Npyy/XrF

5zLeRuxyHli3oy8n6AeYhhcwZiEhuDlJPP4EMK3EoX
HSaOmD2JW2VSiyu3ekcS4V8iXFxvg/taBRmZTEdfQ3lmjNPLCMBg+E/sntoXzA2D5KPDXaKLexRcbGoh

/5QHcjxfRxVsxbhnvBZlx06KJ6Aq5jiQrfTr7Y6DezmKdrx/vzS5WKTVQCNMc3RsZ8TI1Vm8xyN7BUyP

iIfVfZH+iaPcXciB9Gsoo/59HcjBwVWwwRVhaVJQbN
GGCKKvECs53+effrJi3CdyHsZSweLNUSDF4IOKThvVBE0WYXqqVij+Hdl70/7+HWW/sruJe4jb5vwwJk

dDXbBr1leg9eYugsnT+tKsqiaP1Q4y11kFqe6wVgDj62oi9/WyMqPKc6KEdivFLpICssw6di/7VlcQ/s

dHD3BFwPYND3OpFmslz8Pz8J16B3LeXDqWXs1gA3fx
2az2B44JvfXfeHfjcLSmM+8ywCVIjgG7ckcwQwF2ODrOp9YrjBO7tbkqcJzXqSNB3yZfZksOuQFmw2ca

Ou5R4DRoDQ6DwAhheVaIFrfgIVTkQq/nt2Ymc6Rd3Fspb+Bj/jgytmIiNko4GG6Ckjfgzb6Z2bcIdzUg

3MvLq5oids0e8o74Xo6chIVxA/8sXEBVFo5dgG8Lcm
92B7iZev4ciIIbATqM+iIYFX1V1ibpfxsF8pYQQjiuuoFL1Fj79AJjA+l93wzs1qGGeqAbhQgXMCj8hW

BdcLlDLSKkyMzm+6vD0vcrr42w8cTOUHSwZt4v5djblRB+V9NbGsATsgcDV77la3X24pAMpit4BZO8D8

uYcWzEzVbNk4CT9LJ/nY63wtGbu5MAXlJenkT31zQW
y/37ntzppa/+DSrr4sw4cniuV/8NbK+CT1XAzaEiGMcmXHQIw0yMhOxI2Ydjr4mEpm9Jkg9yrHbcVWBj

IBgG/IBuVP6UyvqdCHpTX68lWdKes+Y5aKhIYf142TY6sA0k9IUFyFsdhPce6FDR3Q2yHd31ZfBBU6fg

E1sRh2HKQNbU6puH+wdA/ZNqRHWHeZciEZGtGM+Bdm
ry/ziCcYteBJTztvBhXrcZkAqphuyKhyDqYhWzazZKxMHyjP8RTnObSk1UwKeg4S6aNcmHnBdRqo4Jsf

5NErzhQgZdeUD7KywCWosmqob7A/PhhRSBqmVISLyu0gor1FpJLWgSBgbTfWLPFzhhpHBbS/ERF1SKM9

vxqE2ta41kKycMVlO9PNebbCLWU6n3xnY1bRickspT
DNCtsnFl63jhxq/ZAmtPHDd+u0u1ZJCyRK63dnk62zdQgz3bvDP33H06wfOcxPy5lnZBnjjOz59FlCqJ

IZcpT903ujcT0nwNc4KKHLZB12MflJenCAvTvhWTuqxjaMTTDX+icoC/BmGL0EpIdg7TnGkJ7m7p1p+A

5oS/c8NTmaz6xdxhlR6RaCHQJrPqPnjhryRY5HRrr+
EO9KEvPw1L05fCfOiEnFFO6XUk423h2tNmP0pMvvaGm++zMpXwDdxEQGnkM8rdEmQ8qC99ObT//lN7eL

Gfxxb/KGerjX+RFbdq5LMA52jZM69lo/4KWJ9Qi2OIyMXIOBCeCTs5rHljBjgQd/wPwMG6/tvs3HL/6N

flTM7+s1HWJ5rXYruZeBT3/q/zISuc35P9iVQJiR51
n5tirAC9aXwHs0lXJ0iZnHEnOfmtApCqLL+M6X1m8pAO6kdoAUgP/clQb/eIjbaN1aZSZZmYgLB+2yck

2B/d2QmXoeWPiYTtjniAcRm6Z4ULursv8djI2Q7nWvVryWvFmkrBeG+6bYcbDYgpwMgBV1cAh+Dlj5kg

OJAu4FRt2SGNuh3F87PqKJCqXwg8p+Di0sq7GWu/AF
nk9KT9McgIbI5mR1e5s31PzlP3D678CR8eYaWh4kWm+LI1+0NUaHf5OynbZJBCtMLQp+qNcm6a6VncwF

5fZ5z6wJBnx6I863iAw7cwsmBWHlV5xA0bODsmDP4oY1dqnes88DydxWMQ3ZVppq6t2z+Emp3iPZcV7K

43+IbdQpqwqGjpR28QbhT+9IVmjZv3Qj1kvXcQQbrN
T3ntfjw4YBzUS3PBZ5SVBT46uF90djFOK4+ys3IBJYF40zU1PEqdta8jiAB2pmSi7b8XYPxVpEJgASFZ

0xQgsTfB+GYNcy8XU8K2yssMGJyKu5JUbyBlmDSrDrRikAypYZYqFZzG8wz6EVB9weyLdJKCXERJRA3V

7In7Xf1iA3VGabnVXum8CBRjuzugaH4/REDq98MebH
fV77QA8zNqZSCsWeJji/2m4Ruc7Je5O41ih/3A7FpSoW/JuJZYAFO90i/Ot7DXkVc/zILgjldhAEGF2f

msHXqPEFCxnIbNFAc+h2uFLONI+6+6I7zs1Qui/cRWB8cVS8l4i6Ak8z+4NJyYYWHw4f5nmXl/nAgeLX

P0j5GKy2EDHmYE7dqaTS+/+Q/ozx1yJtPDJ6ZPZpJv
VygCf3rtSCGXxOtEfFt30a246CvhnEmASJOmmsJDG6fCb8aa/nM70wSgFI8y82fGn6gwVecuVrfE89IP

QABXw/Mc0LikHzQNPfhsRXjHg+R/pUSTc7DAn5tshaLEzX3efXPcQvU+ue3TDq3zKADWssqJdqOOND+n

/oKv+PZlELNUO/0Admlm/Q/S3EUDbGsKwv2e1wKS97
W8wRrRMGV7pGCGp7xZJyd8MQxD0ZoMIEs9TJwFIuO3+T7iTGZp248UTNcZl0chnkAnRsoiYWaoXA6+Z0

gmETioDIbFVQxja+UQPLl3B0uOUWsycKTjc34ptHwuRL97nIAamBCQyJbCYvk0y/yhfjBAl+HZmKduDq

L++eVzz1ACa2vLGIscp9zJ2FQIYiNMAWguO7/xk7N3
prB3Hw0U4yar2pg2db3VG8xmBp7M9qbuICxdT9dXSsRHot3s5ellbgz7tcwpjsgIKQIPLtnzHUMcgxXd

Swa+6Y0vnH8+ffpB1rKfmwfR/b73+2Bhy3Guhm3GAoSdlz99ZmxgowVPb9FeC6Dztu6LcSM5XzOVlzfU

N7NkxtnF6nKOIz0x0SN7yVnBK91JxlQPsDAq6zf52B
oDDW6K5lY8thDT5OccqsULPUbe1vxVitqSkIH3fmvP9hR0yN5ZjcBuwN1ape6zdfdNqBdzNm/OkZR8sD

4QqZzVzT697HToE1doKuq8mQd0iV3yrKav6hy+W0PZ0Kc4I+9ROKCWcPLL0HePKlrYT5DuQQoBBNttaX

ctUk4s+jlM5h1YqjJjXR2gqNDCBwYe8puPOE0turQX
H+SM6IiNH3kXuXSdRosAKOzXPJxg//3vYDBnvu8lhL02Wr1nuOC3IpKdu/LymOJGs8eWHS78SVxJPMUV

Rl03MwS+oycjhwOAzvEbWAT6pdcCxV+nDJcSc9jjdtIjq5pTTe3llGfcFjrDw6EUMgbvghuKMhtDoc5G

9J0m7gj+VGmR7QOSFMh4SWkHvrjrMaby11iuobCyc+
cgi38MTGyiI1nDR1IX/JYJcjbGq0YM6ctLIKY19sIr8qc92zb/2Nq56bg4hZ2dfDfiMn56fzJ66XJ+W/

FDTur6mR7zAp5iSoI6CNGoj+O6T/HxSQbsrbYq0v7eYoK9z0NIf9j8Ovn8XBlJ5Ezio4vnHlyaXt97EC

UJk21wQxjojGiiuGy43cuVPf5gSHpl5qhMnHMeTl0e
NjGjAIS8oYb8jFQ+Xn/0rT27xHifBqmbHBBgnI8Y5EkJgHy0PqKpyXwB7eRTL/RIWku9SQiSPvyuR0PZ

1HxWMNBW/5IaTv0HjYahOI5/VPf29k6YHhL/9KMpIcElGs0Cnd/e0+AYEmJuPQGWaxbvBNYUaHgLL9g0

JN5rO6ILhBVAS7lLX9b10JqSO10L58nOo7T4T2HuJ4
EeVuz3jusnL2hiw2glqrBEVhF2jhCuCOkOyNwdE+YIQ+ZgQb3kWi6+WBaHYH3LYhuFXq7nUtg5qKn1wU

JN+wSnN8P5/sjBF7EFDffCZ7QjQcPQjS3fr974uh60NPzUe1MQrVCr4t9jnpttTOkbTzh6ATZDCraUdu

36RxytAprt7P7Onp5klQpjQ9UQHIepctDQFXxeGdIb
RDP7zBzD0xTl7bFDHw18t6Oqv7nlIH3gFXY6hB3EeEXHvbBkrYfapyPph683/MG5Dfe0QGTdM8VYDIqk

uGtWlJDym8n3wqwCFBVIS+qAKGfX7+jsOgllO8Cxqh1QvfTS/z+M6rxwymrgVqnQL21yp2+dUXPfp0FK

It77hMNqLYQtBpM1Dy4C2f4lkIM1fALH8e9XBFC1YD
+RIq6RszbwZBpgDz4wICRlC1RkEQ8SabX8Ci//+d/Pkltu6d26XJDfttg3TualnlrJ7vwTt2aLhdEpfJ

/4sMp4N+ccbsiv1MIyFO39s19Crwdx3NIipHtgcBN4u/mills/VgSZlBHl23o8M5L705RdFNbOlcIazW1ct

Lvt9fkCjCKyX1u1/UZrcSgXKQG32FjJ4w63SdNY1w9
pFpOU0Q4YeoUqKzrLaF6b+20FY8yuGvD+Z3tzChXziZ6CLAzyhifGiDARo7yvtu7UsvH7U3TFzocjeUn

4lvxmpTY7qDFAEhO2xJ/d1ouR3QWjk6J5d8CSizt+1kvfIcAFI6zLut14yGESGNjXbt1ymxDeU8LF3A9

N6QOoUN0vVBh079WOwvYWkxSffaf2WpodvQNBvo/7O
aTtz7fFyKTwNa6srK/RTgwRf3w+e2iX76ui/skJUt2RrBp3MC9GS45kExK7ukBrAW5A67winSVeic8W8

4mI1dzjfecPBC1jTPtIb64v1IBrqjz9YY5ok/SWuUFPfyckJglJVDWhgN2IQZiRo5UrFQv1s9b+wwgvS

UXwiA/a103loNRBNq1c0assgeU9UL4mjAVFqT5WhUv
WWdGqK7lbJBkV9D7N5Uy/oLGKEwCB6V4UxaBrzrMBnbkVpuw9wTlPsDdVHUDEtRDv6K6vzWRQzD2v30N

JU8n7jnx3mVU8wjxUlx555bInOz1GS/53AOFnfd/DLe+HZIlK/lklYOkaTt9NrRvZ654sUWulcHBj6tG

3Dnpr58sbyjg46PRtddhtrlPLpGDf+7FTMvgOpWqte
ieGp0++4z1/Jltx4ksQcPO3CoEGJSOzZ1Cypi6Hi/Ovz1c7L8ocqGwD6F/sTP0ZMhhZ/6K/Nmf00c49P

5zcwUIrcQLtZm8W6r/y7yYCOQFPWvWSTrp8wRsrGX85bJVOcyPkRmbZRo44goFrZ9VyWgR+17dtyU1J+

OGl3rHA8c6UBbtUJlXucg3Wa3C7Ek3dqEpvm1dxha9
SYjjBbLxm0Oj4QLULf/IV0Ir7oE8398xc5A//MB3euMi2esbLatENnSpbUSGAB4luXf5kfjYd6G7bGUg

5DVg//irY4fu7H9UsPneaTzLTBYw4trhrQpK4y1J+N4k/6ylyWlT//tFdr3jd7500Rx9R4Xiu6jw3jui

HLv3eUXkyoe7KYprhxke7n2FSByIBDJHFjtCwma8Ye
UwYG5uVhu5ynEY/gEAaLK7/aDi19exLLeVoByYqEGIC+7JqHVuqnyYuV91upX1hFLCs2VUIAZXLL7ZJQ

nIba4aVHv96FSRf4TICkb/N+ZDN8tU15wEOoQDpWx5DdGtnLZi/cKEHqpdRsOrLG41/Vgyaj7NvP3MVT

UcuYbvwCuV+/vPkaSlLfhWcMkViHOZ1Z6ukC1jTwRh
DigOuJtZuql4gj+VvzqNQ99/MMfHjd5SLdp2ckL+tRh8Ul9fLm32/VIWL08fYl17BeN5nvr48tSehg5F

KrTI0ac7rtcaUO5ujGj0RwQCaTY5qdoMrcu4xw9IsfYkoLycwGFxP+m8J6+/f69QRaXVq14swzXskMpF

9m/N+tnjdS3qA3hFXBePnM1tEQLx/3Ea+uk/noZCmC
cFFu4ADhnu2q58WDIOYsre/yadpnElX7TnScn6vBRc2fgUSplYsqxmaJnr0Z3ARFDU0kT2Qri1Uaa+R4

lweOyc/TNUPOUjhAJ9R16hyUtLhvhThnVm98qRzQt0HKBBUBTBGXGvD9q4amR9RZG4y9sH0pBBtQdyYr

U6WH19U2wa1hoUr7rFBy66qS6482DbrT9jv7Ih9gQE
26T1Jk8JXomXhcKNq9CysRzUmyl7ogjF6J+oflWhUvstXW5af9W8EhCsincU76lex1A9xn9To+5w2yXT

MhayMeg9oTYIIcWpQHEG5KMi+MOE8I8o+3hS6XV14DYEkMNIaOt32IxwEB2JOMFzDi16dKb/zBJsbzp+

W2DNgXe2nT/tHwLKK13oz/UDBVj3iylHyPsB4zmlNk
hj/Wv9LUxIS2pq9mrixgVq0ebjagZjXJRc8n3au/JoFf7x0apwGvLxAnIuMAqJRkilm1bYO82gkcOnDC

WByQXOlTZbs7ov1Cb34D+hqTIgSYJ8noJv/rUDg3pQce4aV+k9ztjd7SQl+5Px3vXSK+kW9QI4B95u7x

lVsO1q0YdRrZl4uB8ig65PmIaqkdmwnRyOaC8yyxHj
NxfbuL+/meX4pExDEv20WS0YHMZDTNuLcONvYFN5aZoR/JGefssmj1rawvtBGN5yH5NZwV5jUhCuXvT8

ai1+whp72n6kybu4ZiHJ8ulTU6CVHmlTn7aYvM+MjWs/kraf2w6EaMBBfiMAtJXTSxdZBt7icGE0bxC/

AT8+/D7NrSozEItvwMvukeFCDsAEXufNm58AQ2WwoR
9isBteqEdTFbarSi7cgLEVJz0jxEmySXV004Rt8sm3xaX7YcFhM3toz6v3L2Y6B5e4bSJU9bxt6Zv591

rvePk5ZbwGLeTdCB3cabZ4i3Xbt8jqlKy0vndOcEx1VuzMVaBWE53ERMfzmFmkIGiQNfgmkeaRUubLxG

hR9xvY/wRyRgJN6tOdnwsct4n2LfAAr9iKw05994dH
J/Ll6wR9yRmo94lac/SGTq/WdgKDr8Mf/ne2EgLakW+9NrQl0bhUwLZg9mR8DAQ3bbFChNszOYD/Wglb

v1T2UgZSGqDSLYTFNLHV8if4UbmZ1TVz956qzMeCKhQqUZ+zcRKfwqRzyTytRouLp4QIVWSx7NpTb7f2

gSvzDa0PLfjQUt/9VkADKh53PQ+sbYxmOqM2zyMco4
QJMxN1y041UAsULPgVWIQDM3g1TSh8Ax4ohOihvvOVAb++AOlP9Ku2f+Htzz9xV7aRv7Ng2YlZT00/gJ

hb1XVq3cVLhxp2F5DYxwPW/cI32aDkW/COnmJTtyjlbN0PmV7JtArC03jqcBujBihgrTD+X3AKf2mIh7

fwDyAGQVIO+uF+jc2ozZKq1HOjVWdAFYfr72ek38jN
BkL/0iWiAEXJdwLO17JP9RyqEouS/tRzQ414+t5vVvLWUJQU2Z6X2v+AvI3gGKZo9KHj515DrswUKog7

ONhWFeg+Eb+znFQDnijal0xMm7TLGoxU+idZfYsdygIctqb+bp6u1zkFPW9jSp9m8w0CDogL9DR3a/ZI

5khn2E6aaMy97pcL5r5XWiszszC/sEscvg5GNJNHe9
iAjzer661oWdWhyfdDqQ5WbFmp4Yujpt2y12Kv3HSBC0KTGBOPXn8RY2n3KLc0wv+AHiFs/JFVHBke7K

hfmqC+0N7/JXxo0/yVrW/iw0zzHwoAksR7jqsALILczza/hxuhSyDOAX2VRhHP8k0eyjtVnvZmHbICXD

+izdrJLyP9T7kgBhjOG/AMuSABxXY8tu/ADbfZmqSj
Gb9+xv3kU9QmGc5dmI8S/VVBe4+VKXRspVHql5/OLvtixfsajCLDkJifuWO3vvNqItboXKBdV8B4hhfp

nKf0DM9y2Ef0eh8LBhWsIsrsrXo0lT9xCw79SryPnmL8WqZf2eLkjwMEn41O0fwt5zqgnJu4wZGIU5ey

1OMpa7LBqHL+/kL9MNrRR4yTb4F0Lh+Fnx1Jzr58ZF
/U5QLiGo1+GILQx7u2ue1OschDWbQE1x+Nzl2W9XCibK7EqVxi03+PATwlc8r8l6Vk7YqZIXpE7C5vB2

66Ap+gSm6gMwnhz4EB9nY4ZSkJLp8NWZi/AlAGRaeClNTn1LDdEunzgn1mh3sZXOU+bb1ooTWyam4WJ0

Vu2p3wlqwq1I6n+SgqsgKTeJ+xHaOQ7nBlg4E+FgjE
tPT37N4kPnAMxVB4/v6JZp2w16xKrQPBrPBWu06tPs3X0Xbgtz6eY4toIm6QZ3elaUjsP1eCRM9OPQSh

Utp3KcDvHR9Qn4iz4bBAjoRBkOmQNyLOwMShnkVc0y9OpeQHqH+EZY9n63Gmcoj6oDifqPqY0r5ugDhp

HvJxKBQbcZz96JTU/IAxcTzvFF+VsB9gpqkAlr2LI5
odGmAPUx+4BR65qOdN8YvmoW216WKTCes+8BS7WC4CCHNTiYgrcDQA5pOodbvkNuSUQMC/MOEp9x4DFu

SzG74pk/ktBp6Y66KoiBOccCywvo1Ypbi5k4VVABA2DUqg8pOwDIJf21Aqe7RNxVm7csqsMMooJ4M9/Q

NqtInkTm4qRP/qyMXNQGR5AapyY/V4h73jynPlx+uG
rnLL6rp8wWYTrNAVibzfty3sozYmSwwKxeiUthLHI6nMnIX78zZeK1J+hGS9gE4I1PlY2dGMtxjrsWQx

/qMsbIIUSo3wNUBEffHQyigzeKxRnKpvzYdeIhkPgfO1b6XYJ4HKbN/wCggduasj45FyyL9ap+McqyDl

F8Olrc27BPtXzHS3Dn73/gZpYylz5EXcZqI4a9Dd+D
EiixJLudSbI1TgGrbamiAK25bw0HR9anwba09LkDSPbzAJnvJjqm4H0has+fiuo/vaZNJAH9QVmpSz35

Gu9kmkDBUIAd+tMie1ww7Ay6njE6jG47vA/zfqdZgB2OTHWIf0trUehFYAIvYM88dBJ/dk7Idxrl03Qo

B8QzlpGdXUvFPggIO3PwQEAYH2x4lETTZ+mghWsPW4
Z5957lwpAYQab0Wre2+cYqHHme+Ld4BgQte7ulaQz6em+NAwfCt0rU5VynhNhNveOSkEDnQWdvI6kFsz

DgdzFGBv5EkHl1uthWLTHYLl30XCew9ztnTuqeHAJCSdiTJmUvV9VVy7Ncv6TwoJCCdoX0Ouiv174yN2

b6ygswKSFZwU3LmEuTmk1HJFoU0WyHbcN0M3HfkYAL
6Qxagn6p2OoGdhOzbAedLTQEO20m8VQq0aEhK5aF+PGIqVIj+Xh6JtCWQHj5DV3+u4aQ7QEylrR6syts

UFxQ570QjmFJ4Fg/sB/nR9/kNmssyqGOJGCeHBA8ddUL7o6CWeeehFQOEyb/5Bl0RLgoEITdaD6tIGvI

mZ6b5NI896a2Fr9iHe8s+2xrNGoXCqNd5e+PEt3zCr
Mc38IMzhEfhFlcVGHL4pGShF3bqiv3Us04WWLPA0duPsQHzA1mT63gBCGidPWfV4utLUqfAHrZ8nJVxZ

Z5agWW32u1HKWg3okVuYnt1ZxVkbl26zMsFVgjEty7goX6Ah6efUJsqPBqroIl0uEPVua9JTumRBgBIW

3qodTrWCtGHjcBHvjuCrbLBnjOGdYRsYXh9rKvUaul
8QSkI2b+5Bd7Lqrr9BeAig8QYQUME5wmku4beyEPZwHkB44KG+wjyl0ExrOQfpTBH25XjkbFJZMwKS22

Vs0ItAW5TkdhPUYVLQ/wWq2PmAl73W1btz201QxY62mtLfYf7H/S5QW+5fLSlbpY2IV4+8cLOure0arg

PpsJj3rsyh+dBXbS2MO05dyYCMZp2v8EwLi9TpY0G1
v+g4cEX1O6t9+Lzugo+5WQLneDoDHD3FhyGiW92mDSeyzAy7qU8A+z9kozgy/eQKSTTKbKakY+VV5qGm

lE3WCdrMhIQo38Jv/Q2AJ6TG7jUnmJzMxvHDqfbWYCy6jkz0EXuov1hcSwnGNalCKOTb+hLBoLdoI7qs

OdC3Vf8fgE4iNvWQfuJ8ip/8NwkxNuU2rosTe+A+Yz
f/CphImO+BjIvT5V42TlwXIfazB3HTl8NDctsE+dNOC86uhGr5fN8E5sOytdY+mCb+ARjGWdWNXehigP

q7IKLFYyicwD2L8p4D2LK/1NLCquwwIbg8LvjsJGqy+CuD41M9CFvtXwQLP8hXG8UK0BSZzxvJKBLczv

u3xsUAa/MQ+YHSBFlR+hrpJ9WmkLNsu7vufwIzeJWF
s7t5LczYJsMcHkB06G2rz6+VaVUj6ue9IIcGh5QaWp6ddQhQnBflOH4fXII0S3jZ2w9g68cO5TqpoBq+

xogIH16fjcX62fc/3Z/H/03ewi39XCzF8kHj9UELUKTXU83hR42lBfz/vEm36VrWGS+rDUhh4l3D3V39

JeZXQA1+vWGZrc/rdY/9iXcJp5USwOqrYJJoERbe3N
B39Xumkc5JaIk1O4DV0xwiB/NxdRWBQ54putOPcdqF/5bQ1bz1ia09LmiHN/Rpp36e5oao9RogFlkzzO

roSlpShKqOAztLtvT1Okl/QwTQzw/y1c2+SHIQOIPkxMV6Jr30C9WIEZnwFzjOUp8rIl2vdlXQKznGAe

lSlSmZWkUv4S67WUibCFH3vbzC+K6LcLCbEp3rrbdd
IT90Na9ED71z9naJqKCbTyUVYp32eDV7fNJPrTu4Fa3lIagwQ++hDiUfjR4sv3RxZMmC6upL3QvT2buZ

Rbryg4aR68slosu04QfPt/BS+izWZS49La0bvZvTkkKZekHnYOP6xUHgsd5LCDRPFuBuJFBD0dgMPQ+0

F99W7N/j77cN5Z9f5JzEq6s6QymkVRuWN7Rj4c6EhG
trYHynIe+m2GtdQenQUOQpiIIDX+dil3wKyzsdxR047YFzIiOo2cc2szUquFC19pR77kvLVOYPxxtExD

FVrX0ZcX057jaBGzqEeze+s2zlCKBeU3JJa3k2YItWisDcfmbJCzKOU/pbVfBNc3bwyxEIQp0KcAX7k0

ELc1v3FzsGwv+7e+6X0g5WgOxuGElUltbMrJUvtIeK
c1OGbyNbc9u/aS2+d81/+hAv0FDQtmvI6zSrvncO7iSA9UM6rwjbyZ7EAkk2RJ1Wi1vLGln78wfxZbIQ

DPBHblEC2WrSEN212Ulma6LUvUL4B4a8E0wXCYkFLQsMuGw+AoHW1ffeFrPFH77jodfGvB01wYxobXxC

J2vVGQhoRqqNHSFz+XTeu+EFqgPshyBwt4bLX7kChs
aSCgNqk1ANpdAmQ0E8dAgmIgyuYF73/RrTJGl7bBKHd/JsqyBMezKHfvfMpENIX1uE6x+Ikc6H+Pt7u3

inYfHJQSsfHQ5UvmY92sHlisUeFH8ruSH6QeLTzHk81r7o6qv4FxTGZniv7iSa0WkCAIKxitddRwsAG6

mEsBPhzSj9sBnxwmmZhyiygg2gU4P35SHYqStnqIXi
p0twCySu9i70CkxXAeymDiHFVzqJhIFM1a/VQ3Vq2y6EHLZGtf2hZtcXkxERr0z40J4LYIW5JsLBS92z

nkCeMogauT55btEBOXKO8IdXqwTxGYVTddQHNtHI1Z7let5FOLQp5dxHcs7UkVcvb8nVdlppcQ+vyr4J

+T4tsHoz2wV9wPIYzY4CSATXoJj0r0pYbqMoJGs7rh
dgvgL0+grIsUDswmKS70OBBkkaPcr3kzJ34HQSwYYs5Q/ZCb6NrPU9C/3tdG6Jq9jlJHAGEMU+KVCNlc

arL83dsTVqQgDWexSlBvIb89a3k1jOAlQACCJYNhqP3hpWpFZWToEyY/omw8ULNc+T1+2rTtDEJhxOEL

EJZqsv5kLwcOaEqffGsM/qcYvRobgS4bk3ACCF7nor
uHN6BfAUbyuJ4MEefoV4U0Gzmr0nPmY7JCm6osfD77dwklllmM+XAah/h8Dxj5+kvg5vCMSeSJREPbWU

cURF01GPCZK8s9xJfuChmWX3Mmn1Q+QP7ignp92103wMwHLMHt2fGfaQ94HwcYpDZkwPCNTpAzvbJmTg

e/sANjz1I0fDf+gyM7mbUFzpqqlyNxEKRPDo+EyMh3
iVa0SX78K3/CRLs5axA4c/1nd/f1pVBO46+lL/lQtIPCgUepvZZTXTYv0cz6lWysDeaipq7ch5tlaQFf

KZCRp4toBZ4HXjHJCta71cG1GOlIHG/GMGf/k+S89dNFt4iHnLhUqn/FQzmsdvWp1oN0OimPvcSUkGl+

2NiEBLJSis+Uu1tUFTbzlGsq550TjX0ZnHm3WpXel3
zWE+8anU+tq1d8t5/lcDocgzVH0UmC//RZorq4kTt3bGrY8ugwmkbdnr+toUb9rej6Khemp0LcUlpWX9

n6j/2XLLPEv+nyGeM208At4n26BHTvIbCmvtD/S0dO0snxr5NCdNXO7/X/s2LGAwn8xdWnJXjV601KyS

/cBSWPSWw0AzqS93eoQaNqymhsGlqpJNC2v82Hmef+
G5KFVoJc6NPl29b1B9n0ghAL2DfQU6iluStjxQ09x7admJilAZ/NUEryxWTMwh0AwUPDR5L8sMvYVR1l

RQ4+Hbv1NJf/qUb1W++71DTwhIf3r4JDhABkHeteu9dPhrMeuycIswiAD9Jy/CNQ3Vj+fC5phT9f8Q29

nUi+zUAUel/RgkGY1NGQJL/SiUg2VsnttuIv2liHMP
HohYSm3W+TgOyqZjiPmexsYMZg9HQZBbzKq1vdT40p8zZV9MrbE0WbT3b7cgOsPuBusHUhy3tifK9tta

aVpLMUp26taiVoca6nCbsYkNRqoSyIhzHW1VF8R+1w67mXTUAEMwsxMab8wuHf+oYVvyjvX7/qsBQ7s7

jVKWgdacJOVy4LOodNkGcNKEN9G3Hpl5wNxbPHeClb
m2jkczcTdhGhXK1rZVujt1quhXXX0I12RTGXCNJiH29Kl4qxZ0hPrO854fZPuV79HRjuxMPwCqZtQBP9

QeqxJnaLT3Nfebd756KC+OB0iinUhIDgOMHi4W778DJ7Ep6nTC1S/4FbuApezjyJ36VEw+oYVzqQRTM3

BxA5rWs86p/zNkZ5bsGbSlnf/8tg9G6BTAPr95eieC
99CxWh5jtWzHrKVsh3q7gxKay/635o1i8XSSHuhV+35sIeWQDcRX23avUfSHPLPH6gpsypC3ve2YmG3O

vgBgKVgZYPILXe68gFG5CIad/RTYiQNQlTc4JmzAjVn5TDxGomc3MfL9nfu4Adacv6rGaPQg1VggIrTm

g3EdRg6yVdzBS53UvLUFmo1uT+UPn8QdahtzHwiQcI
nFakD9PtKa3jOzuJ6VNZi5iywmsEfH0FJXLS3Ne9Ll0Qq1CS6oCFkoXoBJ5pV6tKDP+Qy2xnePoz2ppq

K5ffOAjNth+KzXYqnzjXcsP03OThIofe8Jzrk8JCxChafLz8f+0kgv+8ks9utaM23gJ2u2vK/NJkfKJQ

iRd4Y/bCA4JZ/iO0Tq0/RxlP1J3mzAun8X5TDFPoq8
nJ8AoRrlXSesmz/3GvSavDrWF4QtywcUpx3WmP4LgbSRwdk+CGt0Xacjf8vfcq7wzGJAiKK6pG2SHa2A

+m7Gq/gQTTRryTh3MKiAh8r4lCjQmXgL7HwLsT6+HEymIJqwY7EYlAPMVXQDuJ7XEzGhq6Y1AjDxJHhj

c1FQ/mzuyjSmzc+NzpPyktjYwTkYWXlHc1qw9Hep1b
iObFZWzCa3XgjdJho5sHcZNljz55S1rduf9uOZ9H/sd8tRXe/2yz5+Hj1mrn9xOT5Dbgor3kyLMqBK5b

pyi/JRWA1sVJH2rnkL/byZsLMnZsWtTjHfcMViMSOnoyuSxhlcjA7UKjBLAaoj0/opxofOXOQc52MOky

G1BateP1YJ3IoKJWbOEAvsLGL8tuqONXzatbbQkMxl
7FzxOeKncppUujqTZLBQcaNecWXserM1u6fAiXb9jCQ1+GU7PWditkb8lY0a2/6r+sH4jXL2IG6VjlYZ

eyTXwfseZQAdsgTv+sNVNvtkRwibQ0MkvzuaIlBgX29XH96tzUC+uqaK5TXpys5XAwhWQdu+IIhRFXMg

aTZI/JTEb2Iu2hoI3Q9pv6sXFz4YijPdqnCvm3U2Ot
YoACSuTihgtOgy3fJ0C4+hCDLHniS5Q8qfsOyp8FlRCsFDchnpwKlnpcMAhDB0m4g8w6Ode+FetRY2hq

BtXBrYbgvHklNCuiSt6z3x/iYEYQiM3v42/RIPnI4tS6LwbezOL+2peEOkCzxZvmAFqh2nIZetOzksJ9

UPsts4gV9dKl5e2DiONXXxtQN6gVaGVzT3nfrtXle9
sNSm0XyLTotiHu7gwb0wTj8A8Wi5DFuN4CDqUdwdtznsw7oeS79Wu2mckm6RrkwGzX4DF+/Zr5quc7Bh

2RlpjEP9BArYKYvCBOb7Zmd2exae6irNUhxsDqJnLsxN4ThOw+tOg2efnJSavNGJeEbms288OQZ7yPHX

EH5YoA5hC4JKiyiqnojkMKIfvr43ET3l8d0qiUs557
3Qj/VQMC+gcn8hWCeeukJ2fNehNXAsF7xKor1hSd7XgKq/dKiuBNwlswc6VTfUaWh8UiDlNYBHf+kCAv

4OmQNmiVAyxp70kjEoM0dxhztyMKM7IrMJeNF5wFb7aoVQ36yKrDebfgNDISW4Si8R+F3ELCqaAGpjYc

NW45wIT1LT0T/yUfdbAe30NYstthdCTordevoP3ZlF
mLBegyAP4W333rLpy2CbhjP8hTcIMzj2LJStVe8uA7c0GRHWaOQ9JpqPXk/dUnRZ5nSD5dVKfhe18Xra

ryVn0bKO2G0JcrSavReOpCH1zz5h/Ec48ZomEGc95AJwm1Jplju802f4YJVUzfyXns1mofbGI9Frcd8U

2uQD8VA5mBsywl1qRvIM1MCMVO4CB+oITimXnx2+dg
/qzLtihfBb3pAZHybUHCXtavFM4hq31C9ztNXsrM0n896nJCt54/0z/N0HWDlWg25loF5P4Pv0lwVBEL

m0JtbedmTEceC7D2Q4Hn7iQDHmyf430pt8+wAHkGA7pQ9xwhIDQ0PZpM0NMx2vw7BNd6rriuxY80xMqd

BqplghkM7a5vb3dvGw3lDIFKk+KwbgtCtGTMaLAX8Q
6L7b9xxValMce8mbYRyi9sLkqaav8f6U7svdrpuka9me4VkBgJXT8uV2SNBkc4Vxxj8lIgBy2fs01s2/

P0ouy6qY8rkuBwermmTxxcKVqgleujlkpiAHg8cU/TBQFbz0V2sPmj0UUirUYFlHy8f+Po+rlOsJ0IjY

IanspFYv6xAYdIKJzx6d5BSFUknJ4DtML9S5dzI0q8
stTykeMn+qSCYMYItFiEtj0Mm16EGZjjFyuZgHA8LXunjJjXFVjvZzp6lcGH3QAw8SXiOgX76c6eEZdC

MvW60OqXE+yUbknmZTVH2597664WWs2ZbOCGLgYn4gsbTJMULbapikKfoxNae9fL4/8whsB0G+Qd9wcU

I+CxydsShrdoJn6W+a9FiEJOyhUgx6QRlS8JZmxeU4
AfvGRIGKjCv4XIKspnF2RJAF54cu5CLkm1W1d9IdgojN4EKCiDZBeiE3NmFLYvO5+dfnTAOj8qinnPUD

1qkU71LcFo3xitYsjhGQnGgobrvINu9eFPplt7AgJ40vfb1CAAcyCLozPY2TdmxaxBDH4/3N75DTF9Bp

USMULiYFjdvYyv7VztIlxHmPtTcDAgPq3JuuMHOoOw
Y5SpY+w1x60EIVCWo8KxP+CY5jgrGxM+CsOaPUJ/YRQurQdCGtrt+gaiM5K9Z4E89HGYXWt1ycJRO7qr

Kzry/3RY3vvR3QultFbss18CL8HSOM1x6IaaCwWsOjLuhY+qZ4308uvfKqvLPLuzBzJtsF6WgovF3nLd

qawNXvSKvQkLT7Sb6iQpKUJalBQvHkNYlKDckJ55fL
cyMdyZKu0RIzK315F5YdQhPlzoGSY2h+qc71c8xsVYdzSJ/2fuDs+n1mBNNXpE+zDZOSs69USAN909jG

HOhMYoKMfg3hc3uo3zDolODCT8DsT2/jDTlF9uhVWfrWoNcWAm6DW2SbVfaBFxosKJGTvy1hG/+labTn

xwcExvnz4Aog9swoE8ZFdBVf6SZXNHILwX/ULyUWfW
KR/LlK6a1XVpLm2HadKKYvScGDx5bzRP1K63IKFKMoNozUOZz5yXtiRwhq5tL/3zK5WF1Afu6H+fSdoG

Oe+BTlCnqnlLrNB0y5pn1OCY0Bd7ufpxshir9+o4AvQICHMDtAPvqbK3v4u373XPm6xAqE6+Rfqurm4U

QCt7O7UkUgKUSKI1Z92GMAEGsQRSPJf5Shc7A/kjS3
bcLQgvDJ+2K85iOBzpQ5sNwfXN66kC5B5MWUNlDwVVrDAXbvtfTl2wGq9m9m5M5Opfl01l7qBe+9g4Ki

6ycVCVjkVMR0UuMRWRqwNV0ODRo5NlIoJwAYKMUQwsEHdUJqbpHy2E/I649175sBUAGjc/2cphyIgv7w

a/6Avj74HfSNbCVTobJsrxfYSnowAOSBcYRfZCXIje
XhYAFj7Uf0Fd8/HM+RoRvUdKtB93pr9CIlpnkhHrlZLpXc9+ZQkZuXKEdDQCTMMFzMTy9jMQ2z4qSc70

yoaSYo40npi/y0dOLnnHAUP61KJ1JTGiwSfgNVpMIClBV9W5rlxTapVHoezXOQFU7L/jOuHve7XiP9bB

O395zS0FsflHCgZELdb6paij4aTmYJckAAiadgtaYN
p9AjTsaAps39HiOhURc75Q2ahro65zhH+NJyqQ8vGZLJ0+NSqbSFksdPZL9/h96AFUkT4oo6VedaFNgU

LtkoWd5aknmtO/emWpDeO9AAFGcLT0a0mChxVwhWj7bmGv2M6Jx3wlEK/MAwXSI9v6dbh+3lhGDISOmc

dwykWZeY2U7zWB+giZjVn35uc/pvVJbMXVOCeL2Kcs
OZoR0d/6KO+NSP9bVISL33ixyqIDmza77REj+Bf2lrnxzI3xp6PsIG1xYKAzCSNTRLfbTtRgXqHFO4so

VydAi+hCmDKOBbtxO5xn9v3lnAGZgqUn9bokpf4w5whmlRJfnFtSAQmfbVdoQz6vvp5HzrTjIuy114O7

cMv+DkS8qkntGn/kqRdh8sUV+r9HruqDyJLFHynUEu
flSoFmwP+k2kXBzuNr2byYxyoaHh76WWetJvxracrkth1BhJmOTEEcoE9egZvAo7JxXP73C2+RUifyEz

N8/I3z735512vKuLhl0aWA+FE3jM1euPIIao32SePOqh/ae5DpNdECXHpiJWfXecRAHKacU1xISIa+Xt

4Fi6AWBecbSeggPh19XIV+4oPlCTdgtqb6XuSKoaV8
tj6LGry/XuuLKRBQU1ZX7ESrMCQ4PLAWOcn8eVYlnT23eyvLjmDac0gNE+oTpxvDajd+tzBzzkkhHlpU

nv3dFjwtCFK6+F9P1ky4cz1UZCdRq4nneSy7J3QWy22DO0JbRNini7M6OZm36NrE9miFuztm0qh98/t6

FO8m3eF8uK+U1Zo4DyowFIuhgCA6FgD99vqf310/vH
B8ppueW3bovFdoo/j5XdvCuF/9lWSRNIt6bSOzeUrBrmEiZ8IrzdADBy2MzfTGGQeG/l20kOHszZu63k

q+KRr+1akbrpFXhGBd+tgCtwZfoQEtzi+rspbN/puum8SkiQHMXAi+OhWfz8uZGa8yhTY7SoGkA+pWDb

s6KuLPQmJIf3F8sCrLZHtNqtjUMPATW1me6w2ISJ19
AnwSs6j8d4PoOuRmFyeUtzuazT41REE1fcx1u5AyABpz7Nym3em2q0sysURMlF/WYUk0ItHFihbym5xu

QX8ZBXzDwVMJfP5uLbeg0+aiWOMb/eZl0YvPZLmmr4fNAilRn2GWEJGbgD17iGkrRVL6GJTxTeg40dCr

Tg29UGQLI898wPdVBkeTRzSpBi0nhnxCFr2WSqEBzg
awrbTPGMnGpY6oHXyIt6/jJ/fo1c2d7fiWoA5gIQX8gP33IrLu3Dt9TUGIXxVZkTAwt3PEfMneFz54oq

6GhV58t4noLVe4hbyXVjo9c9UNjg2BfjTEl2JgNW4gjlAElJbmvoN13sg8oaG7FbIYHDyyc6nEFiEmmP

Jjnl22Na/jOvd8QKwOWw3d7+zpysb12GbB0Slsvley
4cu1of5P2CsACtPXxdEXhi61x4SLkMX7WnDk7ZfmuIGewhRNa0ddxSFMTb90s2ebKoMkzffrcuiaNZiG

atQyU62XKE8c7KbOXIZBJgMoPRQm+Ueo3iDeglcHsTiSFlFUoyxho3v4I34gGP+xzeW4Mu021OA6aTMh

Ba2gQaQIiIEPG6c41lfbSwNY4cNbypanh2AkKpFoRi
L2/oFhTmQ67lUNV7LbP53xdp6WDtejLG/+0duCXAfJ4UgJGnzfX8IXuM3QW5VZJNcudauyV0VPR9bstj

wBXhhSlWIlQ6KNMsLImZ0gz8Z0mBwHsv1BtjULgGwpn4Zo50Ny0i4ASgYBAUmpicwnfFVttpNYS34+nr

8lxJ1SFfpfOhP5OiCh1sFaSoEJddZNA1WiudsHQ8Sa
l115iAzvaF2Cz31jqXC/0vVT6peqOTvQqEXWz2L6ecVa33KtBZZ15n5VQYuq053ykoH97+gr4MSVbErj

veTbWCsYZPOKvNGwQ8fPZFZOjTUy83RRD4erX0zzIdYzXCN1kogJ2eboTaLFKhdulRAi84H3i7X1TkUZ

d0Iw3i9m/G/UKkdNwau+ejPU77mnHR5CJqKeXh0g1a
nTHKPh+uX/Ynb5boWR3HPRI1wvUPTEl19zHUfY7PXHrXCdq73gDbHiNxojSbie5DtHM6Fp3xh5VttsnD

f0hRkN+AglKVQ1jTIQJn1s/rnDev3DXBYhVjCW7UYHBjGluvCOtbOs5zL6/64sQ3glHrUrg1mRoiTahx

GaCfVXhiS0UScL34DyAkPfbxNrwTesqWWAmhgfUN5y
tswWJen/cXghLxjXrMb0UNuQnUZtehfeuSDLmAK/78Hf5jKp2mvvC9T+lXuD4vk9gz3FEd6MYcSBsd2d

Eux6uS+PUDygJwc2DCMdnx2EJnZEmblpudEE5s6Ib+tED8XrIq+voR43d9jggPbT+6RVT7UyexH7awUR

1+qwouHQQMQqEMX783eUVBc6dZxN/AnXa7TtCObq4x
EalAecPevfVAB6kgiYyzYQoJjtxS4ya53563R7dizQKcNPkwW79gUJ6uIfVqnL2bDNcC2sbAVwS9kl//

Is27FdbrplNS5tX0wr4KXsWJ4RmXHdJle4EC1ZZs324Wnr/7yYyrDcOqwozCbziJAkVHsh3jrHjHDG54

gNjWU0PmudeQMtigaM79ujf3F/FPHmgf/+ZXqNXbzO
HHofDIenTaaiiff97glug9XJntf3q7+AORJwtldvSND6bXFd7YT9KyrZGces2vG0IWeIz17B8sEWVOhO

YhDGmDLN8vWqTmAt8VxkndwdY/l0xSKVaVw6i4P2d03UMbeT43tpcU9zQBM4kkH/k1/NCgZMlEHH72/o

PyvkGWcxg0ig5FzqZPkXf1wh4KvAK5WR4GNc5dctUS
ZLXH4Kd35RJXuHo3rwZ8+oVVIzJIseDlJEhOMCmnWsQ+vboMI3poACS3kkaADPTu5jEK6QRXXYFncEqB

pTeX6aneU8tyQRfUpqiCMNtQ/W01+sSRriP4y9623cReTwkTlg8fjuUWGG873mUvtgR9Xj18OcWu9LFC

7G+Gf03Xi90qDYcV8eDlO9S6bzo97VzjrcLury5ZHU
2pP/t0/Zq8T4dlZavYiWKk8eoiANO5n/L8Zyg4fu8L+OlzmUuyEEVe2XxYYKDH6nUTtygAUiktsxrrgx

Bvlh/mN+13k60brKlNCV15w6W9tfNUt7Aj8/9PdLGg9n3YNr+8y7U3zPZJ9Bn6rjjz37JB9LoVYNlT6v

Q3xDj/d4r4W0+5k4QvnkZP4M3sLrUZyA6SjISHrktQ
BYDEitG65y1Zk+8nb/WzhWIWzp0ZOYPd+ViRTjTqKA/AqA7xJxd1Ac7zD7AVJ/Z5HSDU9/rT5julg0sv

hdKpizuJZ/upMMvl22FaJuQ3EXH2MS0uqSyZRl85J+TAFoy+qqQhyEqrIy93F3raqWvq5rHQeM86BSfv

iaIRk1uy1RDyxQmDC4nI9bImv2lmqvxD+VaFcCpr5+
Mn/hBwZQlRi95OIi6ujp2Efq/7ZkEeDOJbKAz3KU1RRB4sUyB2ScFDY88/GqRF3UW1LmdWOBFH+O6ppH

A6XTiWYlwK99ygBfHv124xKPhWYPtpIwtP9Al6D1N7TSP+FDZGhanPktFQVFyWNDbteYwabPBUe12hch

xMIJzVnWLZWuMjU9c6bOuuOw2CK9xwd0AeRXJ9Skky
A77eyT+XSokLPeTCCl/P0KgYf10e8ZEQIpEF0gr8vhVOF8IA67a6cWB3m5pAFNQSSxiZJq9h+JvamBYt

Cj/MDDXaJkL0WemXyjlnfJ0EMR9qkNko6DwGHFrFLwx1PATA13JF4vT0r0wZ1Ly6l3XMsZdXVN1pkpOv

Nn6ef1decR3T13DhWWjwCDu1s6Zg/VG1inQ1P3ZT+0
A9t76dW2mToNp9JCMsmAiAZ7V9xrqU0rsObec2db65ua8AWPf/EK6C20PSKIBxm+l/yRUcL1sB/t+5JE

S3mLr5OVU/7lhQCbW/3TIcDNPxiStCB5O4hJcctnuAba+WrdzZs0nvJ9XT6hRpoN+sbrnTYKTMQhGdBn

xn5RtzOiHJzXIQA/44m9Rh1FkP2ZM779aJxBCng/at
zUgvePz+IuTO1VOLuOtmGZUI+MB136nfPK6vn2mEZKgCwdYz9gfb8vn+cTuhFVWwv49+ckZJZVs+byZf

5EKa7BZFTrDG/+JfMjN841JGAYw7AdHB+asRqofXFgmo6SdOwKmrYt4XrVa1u7RDoqQW6u7KzMLZntaW

7vnNWn+5TDxpjX2fj6A2PSLLd5I9rIFmkVkMx9i/w5
Ev2dBudP8k1TbQICvutv2dCWcGL8xdB1juK/NBNzfmPrV/j5D2cBzK6DM2nwgANP01+oS29VKQ1Q8jTj

zJsyoJg5//bUrYWkOcxgOUzY5+nWRs77gG2PDZEwCvgMxh9eqKj+RSNm4WrIp+/SkJ4Z1ulYs6cP7XTr

kuOc/Mmd4MquMi1rsj54nx+sK6m28s1KVynmlfmnel
4pEdIm1YJt4BBfysztOmzzj/jt3M28BqtRiJa88wEch9Y5yUZ8d1pu6ZO7DE5xx6+oy0EcCBw11pdmFS

vgEh4dYi+TiRlndpUxkcYf4InF6t1wdwW9/e0L7P+hp6ua/L/DrZb816inzg1hsogl2AMSnX9OCtWFXj

hF2zRi0m6T1iHtBwaeh3sfxxagfR1NlPPtf1wSvzP8
sHrBehd0h8lvGhG1mhxEdFZX3G7zji6lJtPgeid6MwvmypTP7aiDUtOpakMXox7wROuJIs5f0wbHGaHD

7zLg11f5yqmXj1mVYu7FageUt4HEL2viSatPnWoYami0jf4HgCXAJw/LwdTlPbIgarE7bNwnIvSunBKf

ntjBDYesspgdk5GJBItzsNIwgkVuiVV+171DmI8VRi
P3Nc3g6bZ38H/bvjaM4lFbKluHZlPCcOYQNxT/S4LUynlR1iSobeJDPJdjsffq+92fWL5cgQyDY0df6J

AmZR8QadPMYnUzjHXlNGDThk1WXoGWXCoQj8fEv6xmgM/Nk950113kosiD1Zhi9OKET5hSGYroNPMJir

K+FPD1/s9jO/LpBYucdeeMzlRSSoIVyvE9XfBGMtJC
gf8JYxiDLkz85xM/RRo1Hmvss2J810s5IrJFgbQXfVOBPVgR2UozKdpTbFKkJ1oXBdnidxp7ySpeFsO+

IxkxOAC7pHX59XddoO0Cgrmva1o7uEmEuQBnn4rw1w2EjtFzdCprCIfe+jF8MxJlKymtCLVMhM8YfMAV

kKiT9wbnihQZPgCYyog6G72eSMLk7ZFZz75jDVlk6i
z/PXv9aTXxhiwZjrf/nn9rQOaHRKeDg4N1O2MG09x7HHotrmb/8vBWmn00h+hftll0Zvt9RcK9WGCEQv

xlL4icXd21uGvZS1FAtFSUxQHe7uqhqi8fjztK7Vd4HjXMyxJsdRZq37/4kxhzgZw2deRYvhLfMS7goO

hYOa18Tn0quwuO5+mMuo4QjX1NuTYtuU2vRMBmrkqG
gVs+nA1e6m6tqCHGVvnDimuUW+dAb6nzqu2CAKOYxGPgE7v5qGQ8stVfdOKu0st3uIw0Wm4mVb/HMNEO

dYxv/KOpLSL786nbPE380nu6qjItXbWDWBZDz663FMCcxSiX9JbSMBIPJZP+hbOzCYnBBEXv+lyAvB7t

/aVP6+vTt5oJVOgCrSdmzw+fclvay9pQrcBHl3CZIx
qeOsFG2vEo+QYpt1cbZ/sTnr+NYWyUazQYt6c6NyzP7i5R6py4j/bP+lHSbs6KSopcD24vboujYqua1T

VA2SriaU0Dp4UxC2H/mE//Nzl/KfbxcjpqgDLozLzH+ZoRTGIBByYfm/5B8bXTFRn4pndGzgGlLuPXcZ

lyTsfulW219bwLoXy08Mw0rAilTyg7MfYQDs+0P4pj
JUSEaJahZb4yCTG5cDi7FE5FihQmBc4Egg7aSc4hrV89FKPZ4lSUyEN2MG4zzWlX4yNP+UNVGe8e+ach

dtAvKh200TBPk76aJQbhX3UxxNUTQrp/3+7Xi5WmN4DRNgYbmphWEi77rNuX7Zr7hrXPjP9qcRnQi/kf

n9Uwoihr1ecGgtR2Y+iH9fmW70hBP429nVtQAwlI50
YJHaVIJB1WP2Z4WsJ9eOi/BJ0onVrXSMAVSuJtoC2dgRcWkk2xBAikAs37UOwQjOB/Tbzd/QJ3evWDI3

1HzLtyekF[id number]ecnq8qd4uaPRP

jIDJFGeUWejcZpQdzJvg+zSTdRNFn92PGLzMUrlqV0
cp9YRVaRvDsSpAxYi4JreICzLKbtXOgrQ96QBB2i0KidBLRU2sGJAZP7bJCsC9O2W7oFR4GhWEshUosM

N08lhzCO7QYaRZi/TkkTfh0YPLSNX9Ch4jzKiZd1ONRjG4mmvrvfc9/36qIqHdKfhhNY5KNfBvCEYH7w

AE+B3K6pAntxMv/TfrJhEOSX4lV9euG/W77OFeGQvz
eSo7tAoZk2t9dWGFV8zIgcQ6IrLEE0UmEy2NhcLV7/oirC/QPFL91nn0tLFHMZ/4iiHGFjqZDND1410e

rgMtffmktW8W7w4qBIhq+OtmgsTRwcLozszoph/Q7IPHZH6ap87/C9mZ0n17clCDTSYqxy1IBdblE9LW

/7B8kBKIBal9W+X2CZldkf9rF/bvuJ6XMnTtFyYAmQ
iSHorhDTfEbhYF5Awa6YKotMe7N45DcM+FGN6KPMzfX1X07SGqA6Y33DKSy2CQ+/xrgNF+1TXgwVj/8g

nJTRYYj/AiYX4QlR/XlISLVXJeLAri3dt8Qctl3vCUes0HDPyUjRUQTR184KgL4+Bff/gacKucdAI65C

fu4HljrBI/sg1JS7QvAUJnW/2vXRifGsQ7pya5tP9z
cO+Kay79bWQeDmUd24riV/F7f21mf42LLOsBGw+cPl3syus8VSXfkC126HP75NDZrQGNXUtR6AoYruXy

L/6RexLZSfe/6yhS6s/9mHTgdqISSLNM9vPIDoLqMr8wE/3DHUlV7sn1d7pBGNqqnW2ypRat4/xOGOZ2

BXge00xAwo54xtBRCYJx2/hQoFf13Mlzp6yhCWTtqv
CtY+Kks9mVq+i8KQp4IdG/UG7xJogCIaLc3Q44TtfqHI6FZ7KsHKM5mXlqOb14q8PFvWNCvDKxmNnYC4

tJIU6PRsDmmowhj0ZZ45SdXraksxqKiIEP2/cX29aaXsJFEABD2lw5Y44BF4MymVuJ8qjJwb4dWwUZHg

9/Xbv3ZtsS/UttSZLy7NqsMC2S0liQZKoWmPmZfkJ2
sy4+Z6u4ghrdottwJjV7SpUhi8F5W4Y1VZF3KGuLjYdtHk/tzNST9tEKTsUBLwfYhzcx0ctboz6i9fp+

q0zPwxxW0+d32d/km4f1+X2KSaKM9T3mOlh9BHUaKm906kyjA/aYGt7jwpIPGZNDlylg7CgdwIwiMzGS

BxwrWBeoo7aftEw6QBc/3C0BqKuIpz39fLDwsvbt0s
ts0qDlXspG3G7ZV/UiM558ydj9TJpl5BzRxMLveLeSIy+oi4QYGtaq0QkzjjqeQTaikPePtC+1mb4nPd

8RggJAaB2T1a0XP4/LWu6X3sXc0+LHh3M2dZjH+x8a//Jh/IJqR5SAp/Ve+kTRFv1Dl2jn/2kcnNJHv+

lanv1gB4E+wVDpPjggXziV4K5SZvzQiRWqJvrA20p7
5qHlJYsjpdXyAirKlqaPQc+iXk5pkl+SVHzDYXkNaIP0/P5LET7r82sRfRKvuKMqiwdxr8eigGigOQ5o

sGoAbTeE+NEJRLUcTKqHCDxpsx7o/O1+/jgHJ0ey8pQQMAyilvHDnVr4mgkjt+czM24w3h5RcyI4GKnq

A+KVuKeClNE/1OmrqPKydNyoe67LSTyT/noS6MnoxR
EYTtxXdg7AhsLJe4GSWR+7H91a3lc/dMAD8qxn4Qo9Y65RPzG6t2wLsEQkDs5PXsT3ADjoPyUsJ0zyPr

IAPhlAHk39S21ZOudcCV9EQ+MWwr2U0kj9z2hig40IRriYSMMTim1TSPeuphjZpDliybl6SoX+GDBdv9

hUt9pqXfx5N/1OCDfNAKt4LoeeDJ9iEBR+qTie4Fqn
nF+AQXFm/Qcoouan4a+YEOlQQnbWkq4DXI2OqFpN6K6PfgJR93I5z4SzBwbPnYsnkY7j2v59XURPneqV

H+PULW5JItPhta0O9fet2xKl5gr1zcbMsCVrMT5CVR8ykoYcAnx7OVqTlox3DbrZeVv7/UmBPWjJGwwl

QTYaIDFXRwNQ4NvPLt2tSdM5pgrgyZGZbTor+aXu+L
sVe6JdgCe6BUf8bRy5Luyel3/dQZXuAZWvwK9AWlMx6sb7MVG/XjQkj/qAcSu0LjI+0rgR4rXF9v7JrL

66u9Th8EgE964zxMYDhZaRkDl52Gd+X6ckCfC8c+KgvQVq+LL2t63D12iQOKA9vMjNSbuXJmak1rhZok

FC76PaeqsmE0LJDUlr7CODvt/OhMIxEIA8FZ6R4vWT
KXmLPxCNG3dIAyyB+4quOJuf05eS/5C4hYf4gvciDYEkgCf3Q6BQsTMptNh9adPE/+wQDFvpxY2rK23V

mntMTyPUrTTgtZQRdHLM/I4oqH97VZ004dil6HmCLE/WxdyarjA5CBdVUvvBlt9kPhfW0E8PakI8pnsJ

FXCyV2zF/Tl1BZz2mymFER3nNZXkLDPqi3xApFXnym
tJW/8sVmGN25Cccjbk7bIRYg6MLd0QLCozqm9K0uAfxJKJaisFbSSNeE4gEP1SqFetrYj4h4jEJC3+7N

zAwXUymVRIFnQEcSRN7RZLJ+NKKceGc0dQPqmehTAJxnHehAOvHSdCf16oQOIIn2eUCSsiX9IzqLv+3r

dtCn+8Tyz+Lk223kmX7l166QWK9nJDtJqwUypfH+zt
pq1/Rk7cgN88SBc/2uNMgH1lXkU4Npe4wg0FseD7U2ra7W+dh3/QlxaeWOqnsBJ//G4qte5he5GdOfkP

vic6pd2eHFeCofwCZL9GmeaLC57E7imTmfitQwU9Un1UaEsOqcl84rbJy7cF3ZL2+7ltIQE8l8N5M8TR

YFaJXOgqkVKSzEb8lbIZk3dLs1tPc27jyt+fHHV7MS
T4794Qb05kmb2qLrkB4JnxbkfeIqmhg/zl+0ILO+btRJ/dPJF0awiJCQf6ZWEe1P/O6ept7nqSbQB5Dn

S8/b7RfC4vuRsxinNI/NrJI+1m2O2e8qC7na0fB/Z6jQ+uapViKMkhhjCuS//ymmM4LeAfvJ/GAlwBka

3g0Gv3M7/sQNS/ihtWhOb0ksgd2p9reGd/JiD78gtU
rcA/MltEwz5hbjNBgC3TovwFEqS8DTph1tigE5ksl2u+AIhF8xKBDYwzuCMyqmIxvRdG+5jFFf3waMwT

UcpRE0JaVdZbQRP34MWzlA+sdruK/BjBHkJvQiFPbA+suQIboMVTOnPDmnTOs5Az/Ff2KZrJnZE8X0Og

yHVvAU8pxWMRKW9sPeUPerijGXx/OyoKcb3aeNB5Ex
XmfnFfSBCFDY0Oshi32JtBRVmBCwhkBPtC7SCjPnfvYau4hjBI2siyHujL7KJBrTHOj/OQhVYstBE60L

Ziugj6MLUGaBON6J9+7sCBtlTv3CychpJkLfBVBNbdNi1NFRCqowhEMLGMYoz/NpEpkZNyBiO+TC94C/

Dep5z55QYPeluPkuMKAav7pKneai4zbQd3QMpn6q8I
ss/rbd1AIlJBsrclycmO72hsyqC4LRnwRg2IW9intB5u70Hfsqw/rB4KBsPq7OfLwG2JK4TS8GveO1f8

e44Xncr+22QY/jNManoReR1o426HZoL3FMnNModESrvPnoMkIzHpDJOmZrUvNIMk08TI7XF/EnSE+pZG

g0ROIlIe1D5vGBAI680VHmwUSlc4fTv9S022p4jIwh
9gg1uUiF96o/jplO1GND+mBgTkDkphHMFbzWDav+pMZLmJOVj6t7tJaphhZLM2Xrm7e74YrWMtwJ5qM+

PBY6wyxW6LhZXzxF4yeG5dBuscZS7IUHSxgAoRUNW3EGswbmA4KlH1Nj0SIKMHC4RT9zBCQdbvGdec/r

QbzqK8KNJqz4PLnena8nWU8gNaHNN7+35aFGTF8xFH
vbK7ckySsBIGsXK/zXhh6Wqie2m/PK/olPq48C+XK67+7yNXAaSuWsJ58iUtKhsAiSLI/D2MEk6+MUx5

POflopplVOGEmBunmPvdzkPakOqjO264Avs6MG3NM8f/R4hciUZ4A2gZ/X8IkficIreCGnYZ+zKea3ds

5FgM308sHhdvV5lSCFhRWd6BmVojYQp7A07WNSCH6G
+9NLnYIOo4h8j/xRqVBhF875vzwEUnCnpFQS+MmrbKzBnMo9/gbUeA3ut8oXk6SvQbGcqUB4k4y0BRAw

BbIe+NiBAf0gCfoP0Le0AUBZXshRCEXYXKaRym2BVhez308TVVA4jfyM+mtBsn04HmOrzWDuprQlgzvG

UHAZRck60E9g88gFPkJxedITjTTS0wFv+6VKDmL2/n
9sJfcfxmMISGPSAPpmWCxU8UOk9Lnxg80e8cNsB0+UWaP2BkKAGXNfMiNmX/Yi41Zcp8Ns+V1WkIpkF1

HaF0s08iQVYuqi2uAMXzValrKhTbWByQSNTxhs2Ma+mP6Je27WyevrH7Haxexs36fuqvjje2+twWpvpd

GqbJsM56ZhBxwDxcmHxuo/OnBe4Uz7IIMo8PSjvVdy
NfjZOYoCxNQFL1IsmIE9RfMSPvGTY9y0D6YveVX9AdGyVYxKRMDDIixicn4P4JlfeeTN0BpIv7zp/OSg

yUCdk4PQ2Xg5MHN0BhoBCJAS7pUdGec+i2mmwnsUtTpYjJTqrONc/bJUzFc9I8nZY0libGDWIF1pm097

ZDTVMBtj6ux+xmkk7tYfhPRtXLsZnWj76JESphiPc1
bt5c1uajX9fQo2FWxMm9dKXA6/00VJMGuBfhoN90/8U0z/Y2jQmB9evzwUf48joEIj0CipMu21cioUJs

9Ihq1hJlhVqr+3xO8yAUJNNVvBgNuX009+mdF6vNpjobMzfSHyexuSLEiFccP0w6uLumx3zlWmSiCE7x

L9/XdCgkG3LkUzt7L99LkZbNvCnjwoSAbeIPYgKyz8
dcoNXFvjs43wkSpDRFGBM50KqkKoTkFE/MP9E15Rff6cE1elN38iFdjiZO70UZqH9N/ip4QkOz4pv43p

tQL4FJUxA5p4nUAVuAr53Hp3MnZwvI7svQ7fp+DKBf9GpVUb0bpuLyOS7tsnDkJPx8coLqywS0ysGfqv

Ht6wCo7U5oxuK6j0V5XSvrxKlWsasMKLIXt7+utKrG
jQ7sOQaTL7813tegzmWljgzf6M99gjZveiZYCwW/nC+nt8j16IqLO8UClWS7/QVNEkqfKIH+NcCX0CwD

qWh3iFZoqkwcEsVCHwiTl7oKc4S9GxMi9uMBUxHP4s1u4PFfzNehJi/LgnwRx1MbhqlTjjcCjGw0fGg5

aNsDNo0d2PwOLiVgQbAhVZO2tgi9s03fYVRqK5xlZl
lme21mF3WUED0E0X1wETeBrFIlCN/XkPeb+QOS3dkWzPfMU4eZF5EbBbFOnvqBV3S3BJySN1mBLK9lfx

zMaBpDWVlCsfdbF6Fn8T+6rF5DWmn+aHD+eY5stduTYyAk+sJf8CmW7G6do2IKDFAdrnDAwdKPvFnePl

KGPKJSTCpXEgQ9phZ8+zkeKX/o5B2C0B8SIdKVmwPy
Ecu0CjA4OlEsJvNfyxrxMsbYjhaHKWz9CB3z5aT+7DD8Fin9esH/h/NhB+6jnXzqWABM9cv04OfRyZnn

r+gXya4cIbXHhX/8qgZYN+HzGoLAkanjKxUxVYovCwgzq1r+/0PUtBDlYM4ttjjpxtlBkUrysjGxCE/n

vEesJOKGcSkxz6U605wTV+SdB32FKfbMhTrfm53FcS
aVl8ZEjFxCiQxgApPjzLDuYGPrP+bWfe2mt3LIAbX/gN75dmh9o7Tt9qbkTUJdoNWl/QBe8fJsfKDh2D

wdARfUS7MZWSAsqwFnfhdz8d4cM8uUc7n5vyoaevpZ/eL9dpvu8nM4yPPFFjQGwhZekgqRL6GOAATGPk

EAuEuwDGMXsWy9SgOercADIMOfr9F8kexowFL2iG7F
vySqHVUhQGY+EigIspIbdGBJ5m8WvbW+DtGpPPfyWLVi0d6vmrXTg3asz9o18xpiH/Q6D8q94lN94sdG

672QGLtzKUHj/OpvVki+TP2X2jRslYZ+2FObxAJ8887UrGWOwxqw9BmSGRQZLXIgsuyOH60f/Ta9/Qg1

ktwQ3cp8JqmMRDIfYJYEd1nzrE8M/xI29f+mvlG/lG
fTCtZYaB/kw85gA0R5Hgc+P3RtRgsVQKLAMHhv+tzFohOD9MwvjC/3jF2l2o1eE5xrIo25or10aEyFVQ

6Yzb9VeNk2k20bwalIU7GTSm/M/dXPAA8x0ENaegPhHXHeK3dLqoENK4CEndpdImWx+Vy5yiIg/cyTjJ

KvOhqtGdg4RBg8lq+36wMMFLBXjmqsugDPiYdjNdkJ
osxXk4uXIj4pLoi1uGZS2K2stTe/BX7TBEe623mWFqd28tuip9Yu3oqQOLb9ZYTnwqDkY35/GfbHIzBU

xdokGqa5OW9u3HHNPkRIEvaccOC1XF5pqTch6IJLrS37yj7wT/IzH/NQNeON3zN5AQ24V9/Ege3W1tAd

096hcskCy78m0ECUvgtf2dIKvFX4Ogfei/mVor1m6m
cljvKdbbNMaH5+S+O3joHLydAaHVvBpAFl9P1RYH2fUaMPN+iLnMGUgaCa1Lw/H+HovObvn6XSmXTCdp

8API8ECjOWNkHNVvsY47OKxR7kLmpiGoaGMAG+06D945xf07kUBe9aEajd7oc95CY10mDXqJyCIFcNXq

JKDX/0lF4wTyHvkXe8Cyefq26jXohoaciK8o/DxOVR
w0cMP2/w5sCQoOJPIZ4hWMUU6CV/BFLdGkyW3m434S6u0MDhtd5ba3kw1fh1qJ1d/+YQNqnfUzTupU02

Phj6DtOsBt9LPYuSKgfLfV6IsknduAFmhC78x1pw1gIgPt6Pbbg2I0n0b4i+LX2mBm16aKGazA0gmUx4

2S+LYcVdxUXjgJR8dOVd6E7aslf+KPAleejXL0AKe9
KZSfl7Cilb9LI1Uj0LzGKl6ocq+I+0nc/r938o6B9d2HDBit7ZfC2qp7j5dX+fOpXkqugHcgGHWiuZlL

m2XJDAJPJRlVCwxeBMFNjSZVB/d/TqSAHHZn56kdYR7n+iYutze3Z5PSXt+zJcSUDgTsuvLSUlXJafdm

/6plMAqdYU2Tfn5JuU+QdVOrC2NRr9vyRfLwK9X1wq
z6ikxXiiPq8bBOQ7oNvhglEJC1MM3Ermkosal9fqxpTqLyr4ZFKFbcydtLmaR8qA1MyfitYJQjG5OYwK

/WVVg5fm4+dkGuCK9DevqfIKPoC6Bpd+830ncvknkJnuclX/lsdR+/QMfHVHSkPNQkKwPUGrjQ2/Qrja

rcD8zVPTWb48/oPCZn5pK07AdAbB7ksjRsI3zyRGfr
ecqUL9dTkzKxG6tOvTHlKR6gOSDZDVA5h+x2XwUCOPWzshKiGkS/f+6zN+jJzEruB427NRqt7496U0nf

elQF95fvAdi8No3z12Ra6R7mwTdZXMcq4fMJJrsAFVT7Hf9quyD6QewRP1gV1bd/iOmU+oi6iG26Ty5O

Tk0PCa4tmIGpD5D0Waa+/arJ5uUCwCZ92CWnVwaien
NA+/f1g23LLK6sM+uRfIeVikxukTm4w4zeoofVBbQrXQ1712bCBWALm9PxECf7Cvp1PguiwUpsYkzRez

/ris1mWdbEjFtgFKbJaUMoZZM3KfG8ffA9Bae4cldVc+Abgh5PiB3M4JRYQMoGDVpwyUn0GlpkziOjhn

TCh2QoJo/A+ghM6ovpBRg9brKpjo5J1qpSX1ildlq8
8cLE1QuyBVvZxZZad+7d05MvY2aaQxwvRIiOlwg6yKjqX+6tw1Xu1z/5+be6cTkt1S75d+vviOYrxhBq

0Mz0EcriouOTUxhXSbshVSsvPICssItFPoeJ/qp3ped1qhO9IT68qNJ0Id6UNhYK+2h/AbKK6TMBel7E

l9j+sNctRlVhBsng4qZXUii9YAF7MC+iPTlQMV6uo5
nHM1Mr166X7QngGL4IT6TOa52j0+d+q2tqK07kQZKKnZINVapf4IvqmLma9HmYW6YvhoirfuvKqFvJp7

N93G4Qk/QfN7gZznE4sGlgFuLSBgJoBXEzBZFlVNhR/gzhjf90hLZmMVFCSFEeEYgMTLaT3+VzjEMY1y

UKz/CfGoCk/SiRPXxFe2riXgFPbg2ItoT65TPFtLvl
eWC6D6MDfbrwS50Rcy1kcv3xEnL1frI7EEU8CTf12zAfC8hHaOrr7mRjx09piC9cHOJSSmAv+9l7aARi

N7fBBShZkdL2Q/XlnEvN9ZnC71RWFkTx5GZOmgGd2Xmuc3HsSzQlJ92SnlZ/TZ3pmlOS02M79+heDiof

jCELnjcQSOm3neEH6c+P4/gOr7H77CkfC8OzRN1I9g
OWelk2ef8YoVoo6+4M2RAkUKy/fdm3oQviNLqngdCaue5Zi4bXvyf6iEbusfn9vOQkmE+gqbcpKL/wD2

7lu4vG2qUS6XSh1GafUpzWQyhZhwns3c9H1mcqFP6IIf2iEf87LsnsrdRSjD4jWCjklF26GIlroUZ1y4

2eu2+cykjCHZs5CeDrlVI8ZTwj2lJ945+1c6+NxjaX
/TRgcqvSdhm4tJmH0tNtZxysb9aMklKa9A2DjEWTDCjoyOk76dDqRPd8H7YMIv3KDMPbjsoTM7DsLll2

GZkan6hAbOgGvDojZSlN1EXF5iPFrLhJn/0DjkxfFFGFCJpahCBSjQfrqHPtFbD9zOZ2/x0n2RCM5eSu

jF6laEBDD5jtShTfOQ+NbwMgYXgF3X1o2CQyrp+cBv
pjy5xD/bqncrPV/qaiL9o5BWwpWDYPh82rpfVHTumLQPVUjwW2wTq04Nu/xvDU/JKQqPkXB0X+VJLSWu

hPO+qwSO7yBn6sGoAFqd/uZ8qfn2KzaknrtoZ7ksu8PPPavjKiX9ePkaMY9hZD41MAWCE2SUYQZ1FzlW

gDfBjT7FgzJ8k2OqM5XQIkaY4+STBbmoapNU8Lt7+0
4KNPgK4p/8NVd5RnoKl89deaoIX1z+KXFh/mj2UuePqCxM7Lg83rLpAdLxdE9CgOpd2gyXd/VN07c8uh

TSQ2on876SE1JRz90uqcNnYw6bJ7Tb63HnMnp8I06F1mYxU6Og7D9uHEKBFA49WMGioDVd96MhuGLuKM

TFXN5b46MCUE81Vh02lEAafIHhEIDjO9k1MGGFBr8E
0vE4BGiRFuM0Z1FZWfTa3RC5Nd9obbSRw9eMR4gYX/vme/Wm0ome+hVsUu8LhvzZGRYwSa/5Gwa5WryY

8F54FWIoPR2oJYXjDDSYg6n03yi3L7esy22yhxKvFHxvNo3czKz+yO2Srohc1takVUNWyIDyZ943Mds/

8CirLB+8Vmy5vPsQtB43chlOGV+ZGrqTNJhy/9ySDZ
i+R7Q3lcsxoq+vr/wXE70o8nw4rvonAOTLqxXt7oWpVKckRsnw5J+TYORubIWVmAnBVLqGLrgeeZwkb1

PMaBE3jmDjaFpl759/A2EsBm49oGbT1GhdV2otLBLkgB4uznsP5Y6g082YAy+C/TkwKKsEdCKJeMN0JP

XxeKOZ8BTzQLONCNBwYbKdT18TiFl7IAwYe7G2bUCk
sDy98G9wcBzODVM7tI7xWWMNA6kulrTJjOwBLEf+sBWX2Jul27o95KPjwgyVkVMnQ8cnSpIjpuegtAc/

UXimMqGdiwq7qfSKOXZ0OttNd3rMXTQUrlYgHeHroXWa4UtnyKoFbganRZRjvFHaXcaZvc7h9ETAey9p

Sh/Ixj3NFxwUBASVhp3ZOfd3vkE9prGP9XEar3llaO
A1N3HkgpBVGKra6R890d3LKrUQVU1IwGiZWTMAWyUPU5+wg9okPPR/n0uaZKo8Z6ta46qr2ewLjMw+zq

v1sO2VvRShp/koE4TysRod4PJpU05GQ54peVWh+PBluSE9YD/hkjIUXdum9XcjeH3OAUMRqlQgasu05F

tGLpHlr2s8I589bRmlusVDRnvlPcmKCm4edSoQTPbf
kn/bEqARFqZjQkSMlc9FXVahzFVcftApnm39gRMA28AIbKQ8VacCBzqqmrDCPYRe1XnWSrpNIU7gViAY

K8e0+mMTsOiRBVoSZK08LXj6vB8n25d+XQ6E49lGr9j5xMSBBNmf2mwerIJN2JlyPLVyX5QY8HbtNBoG

NKVNCuwTfR2RNG8wJZrcf4/+hOrPOOtGYcNZaagrfK
gdAbkg0z5J8xh1hfvh5wBC3G56G1AZwXcswrX3etfP4nnZ3tbvlE9NI2CuhioCRoB+tWJjfTbR7CffH1

qeP1AnLw9RojbtHJ6Du2yt0xLB0MwdBk98wTdfNFIJ6+kNb7NTq720mEm9ib9Nm8u+4nj7VankcUiBEe

IXMebaru5EEyBD+9JNrXAPvaeMjvrbOAmDcenA/8D4
to9zzrz9G79G+RNd0zFlp0eBkT4g92kw+fpzMP7GkNT6ntVyCiig71zZDR40VQFvdMVp+7CAxLqhGmPO

RaAPyTCuP6rsEV+2GVZXE2esblfs3vpIBe/5XQLGgyQj17mSbHHimn6sOlYhkoChS0blmUV8/gAwsgdA

WMcu/7XGiFZiGNWqblzEWCN2Mafq6d6L9/OpUPZnrh
9XH7BY2cmHrTWhAirK36y/h/v7nDumgAQsbCn7hspXWWKjxA0bDyespXDHeWVw0/qiFd11fLw9hLbYto

bhjXRMWzYjKFVobXkLuGSIEWMq6av8WkbYNogC7zCSoq2QGJs8op2FFNHE0O6yIcn03p6+f+Jxtn88/l

zX6hJTXaSpU8EP4gKaZc71NW/rgPw/ePzt/zOP8/C0
cZG2dlVJCnjVMPueP9Q1k7FOUJu+B3Lv0h8ei+rao4Qi0w/5FDL7+2+QebpTae6Mc74cicFxrS4QfYVH

/BdQXPsms8e+DkrifwpWh4NBZnL9N+OGMbaQYgXeguOBorSX3vCqDJo5ju+5QRoe10oxbsCphPj78rcc

UOiz/982H5kd+ZUi1O9FTeWePJyLA+9DT+Hvd+z0mQ
VAt4ZQBmTBiMCNonTuJC+fUQxm8f3uJfeObC/B83ZGZS37hiqi3hLu2TqIdoiJ7KsMjbjx1ZoOoEpscN

HL1U5L1tgo4GX8WxhtBL71ek/qePipOQdVXRHZvHbl2+UdNDK1lm0sAA49htIQq4QUDqwNOd5ioG6y/a

D3YCSXe1aQy0XHOdTdUx8XotiNNJZCPxeApkqceOvY
vuQXoRu+xlhZgk58X4p4gNWCU722nTToWR5Z7+Rnu2mtxsKNUut4vdytoeiwQUdFu/I6vEcTXdy6P/xl

ZJ3QVr/XKbLc9p7etuKallWAgC56RuY2JvMvAoLRanLU1/kx0/fAfYNBtqzD/e9UYM6iccQIwsLAGIRb

1vJ6TPu2vPJoqK9c3RHFyd6BqgglZYU9T22/u3QKl8
3ogmGJt4oFYkhSLHplFa6IPnoC2SBU8MfkpZ8tOCv9gLswYfRWS2BCHciRbdXArnqLtf7LKObTA6GEMh

MCe/X06U/L1I/1OlUX6LVYWyQldvrs/bwH9Qz/M4lHQdWBjMMg/lI+/VWzeveb/kVYH9gcx/FPQm+Hzt

ldA+vpSm7ykVQudRZ3RemCRI+lea5NYN70itTGUwov
hYczbYuTeZRImu9JMPZDgij9IWrm/6Yk3y57pYYXeBuO9UTnTkHrdA0CRCIQvW+XeSnxmgnb2CvEjy35

TWgbbtu11/UShvW7g1dffLfJfTnyvb7vcRuGCxHbhZDQx25E/lcaTBZxC2jHg51sd7+UXOGxLKy3NmdQ

hcim4d274icK3407aNX7R1n4feRn3h8sZjaV6omgmY
nj7h9Omz3kfDCZhF4cDmzXRqA/QStVGKb3Da0pIDXegmvHIShGxUXoO78ALwyvxjMJLidOK9lKo+Xoa/

LemBtFqFMoiR7eterHC6seu2D06a316T1pMgyxUCwFHdPphAngx1L2koEUNX2OzyKVhZhuiAvhDSuYs7

HIglPCQMj58fzuucsbJKRF5/I1PRL3B6mmUD8z2Fk0
Mp49yNYdEnjy/EkST/SRKJWy/XVOfl/F8txceIVAEHoAkk5tRsmd+gPdcxlKsijAgIHTeAKJyM6hbxJs

JkOeEcAyQpzlAsBfqkyeSikmmEikaX33AazYvMa9yY/zThh9GznUgqmWV/G4i1F3RIDAQ+m6W1wq7Xa2

lCssQ4nUBO+rlFftSLO+6Q+4D04GKoCm1sw9+EgDaP
puJzXBWeUFbmA22a0KOt8Wbb1sUMDVN6/VWkF8Tdq5Z40/Ar7gBwHFudWhqQSqp+pHGf4hsE10q+07EX

lPM0UI9itzX8a2EepHqcwWFOj4JUVobEnzwtOm49/lNe17vHfMNIFFODolDkQhXj9KLAyq9obGucom3I

l6my5a7t1xy+5m9B6o5RmglCRgu0Y0hz3z+yOzrj7D
AvagjXSR/YAKiS1sMsH2J/hZdRH1kOqvsaiF2B+JazYfCPrqEvgf8fJzy1AbvF5s7DzcHK/50/2zTivR

TE0H8u+K7rOcx+bpUrH98qTIHvKP7CAQmR0QTFJsqOW9sufbxbNYdkevblQkf8pGGuzMN9vo/NjT4qSR

0d3GMfVHNnwOrH5QKho2277CHGaksCnlnuMZGfTH88
cdyTpMmxMG3Td47S1tZ2XNDDYYB82ed5B8IEs8ApfmF3On6kyn2054OWAeqdzh83yk51EuNIpbVPMubz

MrSMbTy8YyLgTTdLvxRgDFXG/0Qb+th/JHhWOfJRUTUVkZEDOcFPT867k6stdWR2324E6ypZV4AwF/Ue

T+xT2MrB+coNuKG1wuhYqtsvRi7EyrHUkflu3QC4Nd
C7nlOpup6dz7El9wbDMRvzO6QMtvMnkI6Nwl5QY4JV/NA/HlX2pjt5kMoS+6jn6/hsaN2jPu+geOrN+K

XkxaXposaUYMmTRr1Scjx6VBLkiibKxMStuJe43tDtDMNzTj/BTJNF8N/5SiHKc2BlmPGfpylZcE8+74

kV+6qIYK8nr3oCXR/NKBf54GJzZ6p8MXNyjQxZLTqA
inlpR8HtPIldeROk/UxNkWs53/62aaGpYUUzUXk2rqF+1s7Wa6X0PaQ3N3rU5npDEhI2M4H3wbGgSOyO

Xr/SFxNBSNGE/CED9YsK459eKTv28EyKJbmAv9xClSFdmMddwqB7l1lVk35m+94OpF4zgbjcmHcYlwYR

315G6A16KpnEMkjUKdyTSIfnixjw9ZXQbtHh9I79bk
whrR8MbWpuAXs4fBrgRE/1mN1kayfqwnhpXn1b8xyJpv268VohFKmYekWOnX4NUwcW45NuqjPWMRYocJ

5yQkheoIceVqOsIfAWbpzzJHaZbliWD4awwx7ZflMXOzjtJiXSD35al3vzJJW1Bo+Xp8KbOjFgBeZnnT

Dnyys0J/2kA2nMhhPCE9v7wc8iE281Cr2qs1Ukoj9U
iZWZApckliSr7pE84RreRpZTGM63vOnSeFuqtBq7qYJ1XicO+FnCv29FIwU6xxQ8ozXuTwQO/4Q8oc0S

TtjzcUU7peueNGkc9c1VbBaYEmDqey1nLFl8q11ITLLhy0UwNjX4mXYEbY2n1Alq8pjdlaTNbsMLzAck

F59qLppEjV6l2qb4uJeo6CWkjGBPCYw/O7tEa4v1+t
yj+Wn38gKOkYpuC+GFnxJzJSWh4Em82+YdKLtW99dPU9BvKNTZ/sZ6v6LkZRrrF716NF9NLwTxWAwKvF

DyTnE+LIZP6AdpmZDwA0ICrAqNtrLHHwSx1EnvlDX2TgmPYhhPpwgyUTBVCmYf3dmnaHgK1/ohlCSqYV

2Qba+NXF0IdQbrENRX0Yt4msETgngRS7nf56yCDJg2
c6S4CB5ClLR50pKYd41lB6A03tjGyCbyfbwRKvMzUKZefaP9r5iP8SbLnOghAba3uP3hzU8rIGi+tY60

2OsC906ri1CQ2Rh+HRWLkisMmdxu+BSLm7v4C7RnV61pncKFJ5zMKj0c5rCDFP9cAhKcMbg4V56HS0IH

pTLPuBUrvIhmDYZVHzDIbZqyd3O7BaAbfGs5o8+P37
0vwIclPMX/SJG/cKXdgYvVD+rPu/bl8OYSN8WvX5HLJeCMQ3gFNHM6f6aPx7MikNlT7Ue6TTkxfHMt+/

SZsfNoZdykik594OfW+/sd+zX3/lB4zQMUijCNDOd/ixpqrpyfVG81fZ4+2eylBsLNmuUhGDsg3vLHa7

iyLc+DLEO/fpRX4Z8zyptikM2OUjiNLElrns+10JY2
jz8YDYG+2eyqNMJxfKWzx/sdcN3yzfJrCFOxL+X7a/Xpxp4hX5u08/eF30g7YkUrLoNcXi0mgwDcNOG9

pl4h30S1s8NmYXaG+kijxScizo8suXjbYXtbG/bn8yNMN9xUWimN8fGQCg9hSoTg7zEnJCnb5zXNCMlx

hNXe8vEbIesJNSlgkk6K3zqVWVkSfBuLNrfSVVDS+y
nCF19mfAv/YUZ+gOpBbmQBrTxG06heK8Gby/4ZVrbnBwFE+s9vj+nzoVxiZ/oag+XhhIP0Ni6A4FvUVI

yH9p8W4cukQoXluFpq/pu7z5cBOHPzLJ35KfhxCoXsbimVO4cgIaLc/rL/QsNyyPJ2Yj6nCuramssJLF

bT5d/bYRh+s84BN2GAsJsMVt+RONCC1vCF+dqjUTD3
4/XHq/tpaCqIz6MPczuoU2N5Gf4qTIX/QmBplgVINCgBJVJYhNzRWa1ze8EbVI1WR11wdylN7I9IRWbY

8PtXZy/lz7pDVXLbpr9TX2EwEewh7TlAoxXixz5nr2cSdrcLee+JeGDlszGM8ktaybQvg0TCUH0oi+tP

mlry5EK6dv4lyr+9k1G2jt6K908q4ooVnz+qPf88Zt
NJcKoS9b9ofn2wKbbETNQN2ILNDdPouaOSsdTvStHSE/fQPbyQrnp7sLdreILC+jqR5qy2I2okptsHRv

UZK4hl05IOh+ITks/PSVSHxgk5oVJDGn8UMdLCAz9Ivl+FQ/9x48x6dj6AP0gkml8qhKMMNAO68v8jyP

iSj7UPaTo54N3j2plX2+xFHYWFm6AzwaxpN9bnyNLy
5YBl3jrz7pMkTkTyc5oEungKd+qXBSpEH6mGDfgpz0TIB2jWmv1jSjP77/KLKh/052GFLFCTddqRpMQX

/Y4mEcG5ps5zUyB01LmX18PzK/gGj8R7M8d0Q59UCCvf9qz4yNW0FQSaVUnB3tGTk2HPIhMiutWtb9k+

+cLIakOqKlNb9KL9qldgTsc5d/9T6gAs+aUZa/CxOy
uoGvXgJ5V1Gk+cr+efMBIw1N2RxxmC/i9HlcSeRAg/yZQP/qvdZfHqtbsusXxlpqAP+BK6INuz6qr6Bz

0/eu4IiZAXdZIM3NKk4OOjJBCC0xDAAqTkEMrZNSedSeSg7Bu8Ga3p9Cp7iosUkH05uUSozqH8/tnQSL

oDMefMEFan3e21BfFe65dKucwcAeDAf6iYUQsgD8j5
Fs6muBpE86x+0eqlFsFjGa5RnPoAun6FPDEm2nMq0pOCW0TBybjvI5ZYXmqM0zzlWrW07PulNCYZnbFM

vCLXqQ5yA2c9/8OMhWlrqaAAX4Ifb0rzBN4i/p/xr9TcyMLXdwgSQGt+zK8eXrWagwCWTMNgoTchLLYv

Bd1kFR5PxrsahcI63Q6AaxI3k6QyVM2fanrK64on5g
bT4yPHVtkYsQK1iBkKlunR7RZgtrTI/8L/obGlflSs1LFiDQaLRyL5RQW9YnXv0Uf3EanIUJOpXMxtVu

EDRMBm3aot5DhyGq0XQiGIjHTf87/9vxie6x1Kpj7jl2bVq+rE85K/uc3+ysvdY+J7+qtlT0dghvZXDh

ZGO4KlNW6UVIRNA/QBRe02686MB+I0ZdlUbS1w6Hha
2kQLRLEQ/UtTFc3ZJLJ9X3acowcUBAqyNOmsec3N+f5xyKi6vq6GajaLl1YDaWoiVhwaMpLQbEdcxUl+

rMOP3J/6E7Adyb22bZfDwg0SjaChmvjVF+OIfW4VGDPJBfUdRMS8b3bXqLP7snd638+BXl4pOm8/XKXH

yMk0SYBqPHeYILWs6CuhnfY9de+UDdAdU6sUwGg0Ct
yG40nCZhDG2bTeNcPoZK5cEtP4DErZYyt2BeYBqC23LJbYZW9RKOO+F1gTSCZuS9l/oYzcjqEfcXbrtP

rYbIIQ3TXq7rJ08cQfhezPlZ829jShbiLeCnNsX1NsHB3BhqHkDQM3K7vkrgdq5QjEOtQwp/TZVaGgO5

L0xDaX12tkXM/X014oY94fZTke3BzDehgyoqeHdilD
Y73MWvYZeDa6EUmnBTYjaIoN3wCX+aHtLgcy5CxqZ1HJY//Z6E97dgs0WYfzu2v/vcjcWtXZH28FGLFg

PBQW/mDjmTbKaLBNnZGG1+w0RNy8kHBBr5fz9ygyS2zz8Dc4NNboprch06WlLUNpU7jKQthNMeGa+ulP

AK1Tv4znG8RbrI8gjmRMeKlcuW2grKbUHNqrSY09D4
u841r5iPqBKsUHUaqIqWuBEfyXGeK1upLQqeElHR32L/RoWQa01O3v5Uf0WFBnvS+Va/dzXn9Qt4YhET

cZb3oUYpP/S+t6RRRh+IVfe1BSQINzGgEnrHh2DOwhN8cWsEAacjSFhVxJe7kbRhuaTGpl4loTngnWJg

+/hKRrgZUK4jzsUzYL6CM664vs4l/+y/oZhwW2aqVL
z4UEmH5F43UhS2vm75vWBqvmf7mz7BVaAQreamt8jlGvtv4PCdiI/hpfdlh8aKyh62HXA86y+cHlDz43

juTusj0ZwzcJZE/kP4dMWirORQf6Kj4suYf0GlG/ftK0eWuLkoJJa+J+JYQjABCMxyLahWDHbczceJ5G

iy5ipH1N0DWX73qkPCBVMQqpBy+axz3sC7zQweMpMT
UQNdQo7HvqsRCJ1nPK4PyUJUpCRFaLSZf0u5G0ZDxINdnJha15+kobYCAOizYtSdaECpP6T/fSDIE/JJ

fIhASl/qLyWkl6BT/uzto83A8Q2aTMEvIiCLcC+xkSJMWqkfFxy61DaUMBkbnP6M9NOx6jYqgX0Uqdbt

VgX2aHfrNlyzbqs78IPbtO29y24u9Dh6xcZv+mES5t
oVpUJCuMKrfmO+QU99hvI6YStsCBjWqVklr6k2j9QUYmAIF41YN0G9a/mKRSPUDaGWnywSxY+wd+oQI+

/u1YTh7Ox4vIWjXsq+[place]+t7x6yMc4XHCYG02Fvd1DkAiYLYQYcDp7FUiBXPtEoNH/nM3JFUSETpHyEF

aV41DsNyILaAJk1e1Ez8vRjBkhI2vFUczQyngrtEzI
o+LTIzvcS+1HblfpsyeBPSP0pW72/XaX3rB63c8T0+yTOHwHk6Mh00fhuSL/M7lswK4Btbfay+f/SLRe

bZ06KR+iRyBZ/ENrnl3a+PeHHmDAIyGGkIfiU0L4B2MNdrqHlMqVEPU/uoI+7DJLpahBX+rAAGVI4kjP

jpUFZBzUggLY4tj/M7nmTqF2oex7mv4N+imAEKcAWp
3m2kitKNS+np6Fs2yKTWpiNMKWl8vciipcDhKkF6lshgOnarCTe/FKHIwG7NElcwUWvNgD4B6dlmxXau

8GTgNwteFpa6vtsxdq4V21j/Kh0upHHgvX7ZdRf6dyKJUMWWtih727LdhSH1UeDTHXU9JWqzZ9OaysGZ

t1akxLHJIYRgiT1hu65c1otGPTfth+H/pqJJx8gK8x
TojqmVVaM0LsaWWoSXnGSGenJvn/S12/A39jwvotJBgE9EM09xf7AnSZoQaPOPrxl0L5N677COmQmnQc

IWmS+dteXfgmjWS7S9OPeFaSDemXbIxp72JNgT4m+W7snRZ6Eq2Uu7MAuvDDwsVkKyQHHCDc5X1m/rKn

K2HONdNzDpkkEKnTWLOX8Nztw9NuRn+o7r7SLJ4EN8
W/yyye9h2iTHbrjhT4m9hynVUQENNsD67E0bROnOwFpicoBLgDxcTfrgAL48EotHp2y4AmHknoC/wNfA

nBVZT1YjRteZZa1wDlHlHxxsGSd/3yU1QP6GPfO7igGt5z6UBzPg874Bb6ANtloiL6rmoAFRAsyKEm2y

dSQIf6dp38SBf9fpLIkOYUOltBO5h9KDARy767bISf
3409vLUigUu9ZUZXaupHYlHko7t31ob8peviQGDVgl+fJ7YRNcpAWTtLk7nZ+QX56dTcld9RgoT19+7t

LuedakbpFjIHpp0IGkdmJHA92lBMG7vlEYr+zJSpJYTfzhs5kQhAY7C3jrIDgX7H/Cco9pBBe0Q3tZAJ

ClMZcFN1irxSx9RQqxD64HAYpShrLtz6q2l0zpZB4L
hAfmId24W9owHc7Aw7TCRJVUDSWpjmp7/Ozy8YWhH7bhd5DiPO8Z351n0Kfiz9XvUA437gF8e3aW6KHa

WT1evsgkjIhZ3GWKhzQtyJ8IzJRcn8OXTixQEanA8o+BHaDi3bZ2GQ58OxWlEg4az1xU1FS0PFDL4U+a

ljy5Rbjzb+sX3mew6zaYymdvCNTxGSFl2GIsw7D1wB
Xh7DUQGotKOqz+fYF3cp5kZXTVPnfW89Vg65y/HgaJGuQb75hBv6Ve2tr7se0TZ8FsK4fVqeL3oTMyLC

rTdFUK3YNxLp2nt/MbycD8Al/ju+taG/1RhV1Koxz7RXOT0AkAM1kPgJnaaUDtgU//JuojpSMbZBen4m

90nIAf5fMyU0219o5u/KCVRF+gcxiYJdu0cddPvTG8
1J2h+AE1KWT1c+r8qp5mBoR1LiNarLQXEhgxrj+Fg2u1KsBvu0rT8TV8jzlZk7O4Ok7EmMZQ8G6MbmyD

A7QN90WjnGdDJQkoPvnXHaulRJNP9hVFF+KBh/HUevivfQL/R+lRpgnT+KgcaZ9MeAOZ1GUL3zqqN3qv

Hfi1pJb9zC/ZxcsUlwvug1P9BJzrZ3bfLG3Jnam1A5
TUg+GGVd7WRmqmVdV7sLLrQEZGl7ddRTNaLx8U9aPEd0EnFuvRi1/i2z8jMpwQdIOjbrguRKEMiuZrY7

yZhSW2JqELWRNOo4BhK29Gt3jmqfHCrIjwHpcDCVHD8H9XcRpefgvmyqY0VuuS7p3857sZCoSN7Sd7h2

BySOlZje+d8UFZGuP2zBt5OfLswcdSmc+DmTMNoKTl
ovhAb02+M1EpBK0c4FD8QemVjpAbH0WYf6QpUWIOqnKq35o9Ifmd04V6eSb555HmZ3MEM0g6jsr5jsko

dUlXSIySaLs0zPc1kfv3dl6LWRscoZ2sgweIFOE13K6bhdktR3JzCQt50esNtMnIHIGltJi0ql+jht+p

bCm7zsK1q69ustk6eHJ2xCITGG8UX2R8cX7fbW678g
1wGvM6j18ja2TRvGSgNblGELi349dQ+jTKdwUd3H/xRprg8qs/dZz/qL/8xyc6KiRLUeKbvYaMY4Oj1a

Hrd+4NWfbX07SyZDF7ihsDfFT8CEts8vVAt456T70pnTeT/eJBwUMFciWMxQdBjD0rPz8FWXNEaRB5/E

xzgIS0mOEwxGDGPQ+ikhwU8dfH3RTHb4GX9w+ukuwc
r8QoKLDRTfw7fNg0ETlgmrfvkG4QFxK5rfd4j5CE1aSyyrebxbBmLjtPdAFcNo17wURkrVjyZPvVy6Hx

APy1mj6VDvpGi7UNGTPX9Y25z/Vn0h7OJ8+b+aRsQ/wpJucmQWIvH6IPuXDnXHEorOjQLj/xmX7hr6Fu

Qgaf9951n25N5L6lT93b8EhUsPDuDr2AJ+oH5uDrk6
LKlF+q/d0wfX3t0dTDgZYtq67A+8sj1+V4HFESnr+VRhev5WbWV7LK09/ABs58IOGMETAYs8Ceu72eSX

wft+3bK0C+fUd9o/cEznWkqly2Teqby2uYLXel2CXw+7XZ/8AkvVF9NjQZEFF91HnRvv68H7UxD5d0j5

2pzUzz1t7HwT0XvGgvxQKec0eFmOHQos+cQIaXLQOs
NMnjfMNc1M+Lnh7BxWOaIfWhBnRQP7rlwF8oqfMzkUSZ8CqJLejqyhxcPwbIzS+GjNge+/XllT27ahDl

NXTIyBL8T69D13U+BAvfuOYKl4gVF0Kz2t8Eqo//SSmdJA1HqFb4KyA8Zh9BSxW0/JWRHGSwOMWKyzSJ

GlkS0bDK+B8hoT1E9uusI9O6+ntQFAMAKwnZThSSwv
TGkKWoDpBBCrjdo1FPi9mui04YdW/+xoqafrnZjRZe3o179uuC1Qfg+jOcPBzco9oJr8KZYh3h8eOq/G

OyN/OJMCduZKW5J5xC6yXU/iyQCVBFUtSu3QDBdFfCBN4vn/IFlcTk2nEeyVy29t7yZvbnp1FxpB0JFq

q2f7Ed8UAoRUmdACo/X8hpDTxjUk+PDW2RqarQdYiX
EgQd6/Imv1CQ0avQrFBBNEeVfoZ0k29hjB6HnlQHaCyFD+vY0MTgbAXFcc4zsH+Je5MoUjWS3x0O0Knt

DZct4bCG9qyRhYypziVIr6tzyMuDagXbZafOGS8/O7l8U8Td7Qov80D2ik/snTrickCRFv8Xd5VgDbth

CzgC2zAU3AMSBz/hpN0v5WTbK7vi/hMp/cL1qJvxwG
YW1CCZ/1+90b3BFGFGe/78cLyTbUV8GWdgKKh5/3fY+PxjM8iUayb1GfkKC3YxL3L+YmX1IlmhWgb9t5

vbbIQ8hi+qBsmG7nGfWY9MNIzzOKLYGKpJg2JO9R4iHA4Xu9tzXNYU9MWlMPzK4CwJiX01OHRvkBVYrH

xALqEWrW2HQ9X6eDaxR+1mKlh0LMkAtK9IWa0y1zDh
Nw23YcsSz4vZOPTXnuIevKl+FB6vD+zvgJW4I7U9+QsK6CnAYPDACRUQUgPc3E73C/fLpB0YpJkLctST

CXOitAUI8VvnqRTy7gpDOH1zJEBk5BE2Ncsw1FpRuPlL1xnS/m5zuu+Y1vEhv9OGf1UPdvH9HLCsTJbX

4IIW96GN5twCNtnF26WkVzT/I/ce1TQui0b13Dpxfx
cM9e/ZSWXgl4aMOk+NJX9/kosc5keelZF54yxFQDYO+DgA16C1pKvqoj7KDb1XtFRcYqjk/dBOOHlKu8

ICYOhvusVHEnqYE6z0FE7kRPsrDZHIS+G0/NIqUoEGXAX07s8Jp+a2uVlq8Jq1WvsUglZsJQEUsBD3f+

y/aCI5y3zBZ143g2Xusc6D215C5YifbPEzJBBK/kWY
5zcELucMLQHmj90xFt2rddBq4uvSIk6/0jofQ7iMLSw+AwlwVMo711SZzXHcWHWYzVzdBdAlUZZTQBEN

IuAlO0lyAlaeO+WljgDo7sblcruAdXuBUkfFyU8ttHqlviRqlFkYfSmONfMQ0PNIT+XChh/Pmv3u1cME

pvRPI0p+BnvsDvWaT6cBmplfMI/Sy1ZPoClughxUo8
aiNiRiuzhBOkC23eVb80hK7vz/wGEnfeHnB0g9d2jZwwwQLnnB+5L/WPeFrdLB8wqRAuadQ40mPyk60i

3nAwVGf6dK4fQXoR5AdzyT8JsbrgphgZibOUYK1mU8mzpeJHAWWAfB8Ca2oUFIYaocFXxKZ42kP4gCjT

o9iNGyHhh1uub0xV2D1gfceRtPqMI6aNDwsFmfKNTv
RzNtP1Sq+1tyBKO0tG6zVsj+i6nz3fCDHPAR0ixj51fcaNKoRcQ9ABGuH3IS4TSQwcxT2r6SprXGI3Ha

flnIfDEEQhBApY5pAWppA758lH0gU0Gb+4RBF8VZeU0SD5PNbqkvEbIXYYKP4HIiSlTFM1RAE5bJ34MP

fw0cTPdN1TFUUcauEbjNG/JYQJxNAhuZVaYbLTNqtz
Zxzh5dIXem71nJi4BTz2SUAO5uAQXKbohudbmU7uJppfn7U5z1tWcAHxVct3lHSmjMiqLzt3nGB0vK5F

bQIMUYNWsxigNdFTO3QjoXSHORWxPHGZ4MasiWfL2Z3a3Msh/XIcup2TZHs1+kRp5fRQZWUtlilKETi1

Yux1MnyHTwZQ1e9H3o/7SphILsxKcAQZyz7NDOUTwS
t+uQny49eQlLIWoKHLGpApEPeUucPiX1hD6iVtTR6i8Xyo6Mozrh9LeyhCSFrCzZhcTEbyTZpO4W+RV+

oWlTedEd35mGD3De/seh79ZnEbziSSCkbFiOwy/A7dGZ9qs50GPFtyt1uEjR8jc3I+pzJtS1c9FpW/dG

JA1uggrYQwDAmoJOdAxRcrR6knCB+gvuvN8PLBfEq/
BvB/F7v67Gnxj8qq2YddARlp5l5XiLlLocF7CW4ZuNXjkd+ODKnVihEkp/6UctZtjKFVs25guBjn4SQN

EKqIh5o0VrBUeQ6ANoMTMtep0PjpGfsTPCVzYrRy21aeZ8r1N30+m2CC86d+lVUsI4i2RKxQ4MQjohZv

yBdjxWlJO12+WNZYicr73vegZ22dbAmr8xZKb4XzN2
BVHsHrQ7ckPMuT+vF9XEGOj7rnM/1iM2Alwpzu1emqO++ccA5s+QPHqoVokGAKdg2CiKzWVqZMhlvX3H

79wSpoSOgpHjVCBsv1ptAnREt8v/cTmhGeTgp4FohTDBEJa2jSKQYLbnEBsB4NMy5y3rKhp76YkeODrt

AfE1EY+re39pp9houbg4hmBeAYjFQpyKI2gOTRgdu6
Yh7GO8Ek0QBFSQ6AqCuoJs8/a3mv1w/pNAwCLxpH9+sPV2OkCtvYhaDShT3a/i0nG9dXidLaIgZ0ni0c

3DmTqWMhPTlHZXxoDCwEMRPlJ9ZJf/Yx+TOYUm0rc0jyfZBnAvR3VS2xunZxg0V7P3jz+OjM8+5mPENL

MuR9lmHKmflHs/U2myWSlrGSw1OAelwyspo2GaQ8Y+
At4+JNbNU53s7ikxCGcLb8RuNPaInts6nTK5+2np3/nenhojTr5sF/zQR9FlntUe8rnrB4QgkcPUXg/1

VIFrQ6ZV2jni0OfRHmcMEF5qC9NzhHk/jnxNfF8hYyBlV1iuzfe/obLUzE9KCC5VhUAqqmOL+gZCJP7Y

Orujp9z2O86IAVdYouedkbqA/3vFTEPbPNintkfHGT
M6r6SrVqZh0Trf0592sonsx8V4T/MtSAsjfYl2VcGiK9TT3UkG2szJzXmjz10C2PB5ef4LASVrIBF5ib

vMM9k7Fs1DDiihJVTOpDk/gAt56i2uyJxFKlBFBt3RcdqLT6CXIh0xGsZzLKV4WhqOwvm6EuBZV8p5dn

alEo4DzGvshrO7RZQj1L3XW6xQZEq1T2dARCTQ8JtU
pZFlTlu/iau/QLgXkoIO7iuID1gMf8HFRfMWhC6ZdeUBTk/2uc7W1uzQcqVYC2q1E+noU2wEbqaDIWCp

/ljt6MtkGy5LPtwMk6Luln+BxVDamWJhzZOdape9SexLhHbj70mMCXqvgX3yp6EpuhDI+3SMtfOLw79K

z2smYp/EJ0vxRJEY4eHMYvJ/yE5ehP3OBRskZ2eCJU
cr4zKunTe21cU9D/6JgygyRCB0RuezgqqkfJAalAjDyfTC8IFVJWDZh8A5Z/8RZRiyStY5I6hpfrIK/b

8kBOeF53YgeOBZKp2CStBv+dc5LkCwp+tAQzZgr3ilEafR5Q2Un5aNgdR0DIX6E2iXy9ASE3MQpvNhvD

lpdfpvJ2AjjqXmxA6l3otDn4/PSGdFR70R/mwu4oiT
zZdag3TWfNV1eq4eRfcMNr7w+szJgQ/oZoiBiRNy00lxDrbFWxxmEGQeq1747Qb53ej/mPoL3jp+VsKS

uDLbKyPvfHSIZkrtcwopoCiTGvMEyrGPGZg/QemuQ7b2Q/dOxMku93gGXfyTIy8v/GKvfqRbYKwBYNaj

Yb/sLTZTNaJTAMsu2AzTn1jtcVbU/Mqme0+37B91dl
vcmryS6FoRrbMmIx2RiTiesDO7MYeqpr40/8XDGl94e6WpeMZlSe0o5f2hK1Lrjg+xSqOpDhI346sne5

a5tUSLyGbjFgfwhIAmq9IhSGIx7MwQDa4LYvLl9hTgjVFk4TFenhvE4zI6pOWZiT5EbaAscsLmVx7V/b

q7d9oyPwVsRZLlbcYBbusu8EPKvgIz1XjRd6nhAVAj
nO8BWckPzQxATeDGMWipvZngyQNlEhNwHFNMWjxEN2GGNmF0ZvcdpddI3D6UcRa+lEdskgZxFSDDeaMd

Cfk32+hBt6GzM4OTfBW/TCjlnjsg2nWDB+FDlv9NRtz6eRjMe+TxlJKZijnBdAwQv4HU8PFVYUa0jm3G

Ja9LIhJUPmsc0pd7T9l6E7h9iqlzlSRWy7KenSaHnK
vjBOUtjaOUJtWOeXbn3AmKdqnlIB7KFTyNZFAw3Uuf6GH+2ZPwwrf+6HKT/SMROr4P0rLiMaEjSAcgvt

calJjtrKCagT4iCq5jfQZpXVGe+6z1RTp0In3xrhJMvCT32rKHHXEQcHzD55tePJqeI4iNYvBmFUQgOZ

Ae2SMf8dNyYXLm/ua7wcuPZOzNAKsWx7H43EziQQP1
3M247gjS/npwR/ZbHjfpDSHmWkUeGD5W822obeWZyzfuZQ9svNHSDsIzrkH11N+P24whA7Zu7A3kn/kW

JSodeJ+qHTvRXsZdOqKNZDW0Gt6ct6RD86Y5UtAntM4LwZeutNKGVbTBelaayD0m0giNwS3A8qVLmCSj

o13NyBlllj64utiED37bHwL4e1FzrJI0ct8/ciylsm
A79Aj97m/MqdhaOBRADNWdhQ57fVqwlVDOmM/G/QN0zyBxgRcsxBMR44mjo7oVYtY3DcN5aY6D0ToSum

xXaphyg6C3GkGQkzB8d82rmFPK9yN3LdwkNWiWgmXuNa8EAgypWAfYDcH1kSzBIAU3IM9XjZ0tdDo5Ma

aIDBcpLet8ZqiFKu/bcUmc8DmsKe2bisLrnZuBR2bW
WcymdHoxG2/sJTgp1I2smRcAmpX9HMdHOqjP35ZngGurjHCY/lZ9Ir4hxYKrm0wz2frvaZhqoG8KQbdb

jgM4h50rKV6qkzq1aaHRm9LGoFFMb35d8s5X2LCwAGCAJh5mm21fHjXhK93gdg6Z66IJWKn9n4GIQLSE

0I+FSzTR8fQ1pg+AfX1PjbBV1Zc9gFR5Svn3HlBKyl
a6lymKWW0jVo1aW07ZvRCMTo+tJRou0ViCej3jq8iW3eJXwVTHne6/49pJR1QhOxEV8a2kx87ZEObf+Y

MJCbI5hAAe8NE4hWIlhQMvDFB7INtGenDJQEgGDz3J44/Srd2BprjTJVL3EvVedprJS4GxwwBB6pNWdk

+CrQsMZwtnICeYe7DkdFESc6DPtOot07qa7hYEQBv2
WOd1+ZTAsTGt3lTI1GsmI85hPxT2Vw0GmBnIbi8fwRHQKceN+PxQkwHYjti9yaFf/5sFRX6sT0R+3HCa

DlYVsmeVqp9iHccbTI7E8LAXaeCfrFkrOJVCdhqZWVoB0cTcfK+rI6rWreM+nkCZwAFmFgNxpPH4mNze

o3gu3WNaYQr5SqHiSLr8JY4kJprxhHL/pmQYLCCp39
mT6iXrB8vCEshoHytkRFsvwdgPonxexn0ppdXkepbx3pE2JlAaUuLtaksiwpeI1oEanAnjJk1L+eKiYZ

bxv5KJPrOcoIfqzJk5IJPQHmWR3Lu4HviaCCu4+PABMdr7x7qcZZz1HiqkTAjMQ2DVR7HJO68rbeaxD1

euRAsfENiXKbIJDJWxjibTDLd/ME26/A7ghpXw/tx2
Kf/KJffhD56RKyn8jS7E64k2IR8/7+L8GdQHKw5mmb2SVLlRbof9W/hcNgwQdkxGJT1ez7grAKno3hg3

mLdLpEA+Wij/TuMtinybPrilQCf5Xrv0Op784gQ8KkZXk4w1cqjmaPaevBBAyikJaeuf0K2mB+XlXOEc

YJFV7+6vble+4O/ZY922iEdhZsaudDF6PDtmvWeyb4
sQFK7DDZd+oyh2Qw6DlNGIV+6mR4y4B88SQxdm1GEIWYd6YQDaMvcdw4xre9B1kpg3/8oK1LcXysR4Xo

/ZR5gNJ6u0lARipFYDmG6ikdWnmW51h+OVPlsZ4sftilijYdGkMkFUVzI+8A+ecQHYocmFTN/fnaTDG8

Bj7KYH+tdcCsjvBYuLFojofZyk3SBQkQAs4UwkXO58
+1LcURHLdu7wtY5uVUMZanwBxscc8It2z5aeo7yBVgoL8isSvNHHR925y5MgezRSIXdhP6CWFQukyHVP

evQASF6kcFroT2LWWXzdnO754WMhjBc/nHFGpM2YkSmqLx9hu0cxI5T+VouQTYKOxUJv6Yn0MA/2kcEa

ohvZFd5fVMZZYOs4XNeJizz+jbe8lQOgQWUrOmWI1c
hK3VsAMNnx3LsFwWdJlOp53Yh2sNa6WNUWgepi0xfjaHl5tl44aIBJAZaUsHE49eGl6fDrn/7Q5Qs/7A

RDJ1llqsZ3TZBPRCoGuAf7XwFTWbYwAHxBZRFyxhMBGTOpW4QwQtR8tNbFzLQ0P11KJIQJ4v6fbUnC25

ojQQHsvH73EXy6LQ4t6HZ9V/fEyuKAsezQ3hig2/Bf
VdnWw3r0hLbnaiGhrFThTqilG+/FiqwLlrBxnQTMMoh9zVqFOFRq3PWuZdYaSWBmsLVfhdcyLTzdiwLi

IWkO9DL/y3gohp+0G2fyWfbosjh59ymhkFjqgpTlhDlahXXTa+C4eN68Wps2V1M1vMvf5BftwkPufZ0S

LgtF6U1LWTDT0zj7np+/VbCfpnIT080yimLdnaGw4m
bFO+LpMBXQ4o+9vuGBTsQAv8ewxNmlrJf8RVSnzWc+usb1q8rScljYi9AmBsJgNIPUtktGeo6oL2UMlY

KGEprc9Vajc2JM13k48dqduj0RM+cADFlfAXT0cWyRWin88PoYbTlPUMmC8rOYL4Alf10tMNu1+CeSLC

Li0VzPiwJzkZwpf1Sb/EjywGLj6WnZXSA4HcOflT4+
es+qgPStLOSnbiTIbSYbTtgqL+1Le+/4+87zEg9DO9CrFdWiUct1feogMUIamPuBD5c5EFxv/BqNmtJC

Bk7r5SvVTRdHxshonJf7reV8tnNDvTKixm8xwJ99mZdn5KhY/Trs/ImMflmPb7D0kq9X1ypow33+UjYA

qyPhLh6nW6Ee2B339kvUyme3dhvUihmlMLH87IhUW/
1HpOZ3KTo+YSb6C9dW2RQvt1d2/FuKiTswfCLuG3af9y7pNG8yfRnjQ0oUJUD0mDPH1jsn1wAiTqCnBI

xQqwm4nEU7yATPeiANToMV7Taz0NLNZkfPcvx0Z0ktUjBd3IH1mn3Diomq49/X3F69MDtzBS/g0oj0vF

/fYvHCVWpZKV7SM6+0YiY9HacXqy9HbzNosCuatAbz
aZzbKovny5JwH2aYL2w2xGRMqbwqNjWSHtJHyKOjHpcI8LfPosgqSxN5zFAJWZCn9yxEN5mdWVvvYUWY

U2wRvdXV+jzriCMtbpWCqxCiSIoaDWZtwjwHAtzy3MV8Jp3LTzhfuD6G7SkEpZ1tcuz5BtAyHrOBAjmK

7vJjpnWinmPt88s3TM+JVMAu0F+j1StFrzX3IHzYD2
JWxOTuByqbLwnraT+Jggcspw09kH8uTji+focnBgmkRZ9ZcqCNpJCJ+wlIA1x4BDTsJBe06plrg4kKAx

mpJxp9jaQdS517Zge2LQ/f2V1SELqHp3vr6njJfz4sA24HR3JrtRtnhxKIe83fSlVMhpIo+FdRRY7Fwx

8q1t9V40ZCdrAhScq06Bhjqd/axuWM+NVpLoNcQz4m
ScW59+he7kOqBhOe6Gd/DQ6pRc8JBkBJhez+0s+CLWv4rRnWzpTMoeugcrW7xrcBhFwM1Zzk9UfeVvyE

6/aKoA+VT4P0qmcZ/6Dy3GIZOWORH/PUIirWwxGHtFO964Vs9m47fniaooO/Kc05bSA4MWdjgabFhWjn

/sinnc0OVcjIlQwpWD3k1s72q8oBNO5o7BWPdOPLK7
Ppq1x76p94tfpMWA210iIDpIyJX7FEO2vompGH9+HcXrZsyP3hytaaM8gvynz6lQKg4vl6RRF6HhYfej

bcRyvTsmJd4jGh0R3943q18J3A5SqBnxaK672u0+TA6qx4Q0les01W7YMoco+Dominique+KrvMuox+U7tO1f6

l2jx5S43SxUyHkTCe9yUoYP505yWmwBUkd7DnmmGFp
LilBgCdi/edhn1XdMDKZxhhLU7OZe/+Codn/1q4X61hO6EY+I5Rg5U19fchkkXLUUsNTq3G5LUZwIjVa

rDE7VoqeOjCYcPJobMNHjUSLlmWmloPl9jD7P0rHmu3j5mStvmYm1NqLJrorgJ+sa9aZ7RVOqbvwSU1F

6EXhkIXB4hU2K9KVmpw/RXJM8aARvTrXCogMB4HK7c
14XKg2/h2deZQ/CuzJ452YeiHkkvG7k7DBvUYddHMPWYDx9nk7olAL+xeR7XdtujKfrPsNbEOepmdwfB

QvF9Twm2ryMA7BONV6yicQdlN0CR+LPNUd3mvdPU83hmqB+35aPhxWR51lTUpKbDqHHWI+7kUFmbdSOR

fAyZQ+Dg++YNVqPScafaEru8p7U/h1WIIHNF/dXX40
JT7Yse9H9JPxhgRhfw1SYopFY22E8WyurMP8fzDK+fLbwNVbz9uMl2i+pxDTE9YhLbqxtABSIVSsYxNw

QDqwdr0+Sz40dy2saljCO5kVxm1AU/WHGe1XdLUNOJA4bsfUCtJlMP/bW/7sbdVCcBCm/D8IQy5giNB8

VYG2Ez0mCT/xWrIeh9wL50MEVnwzofQoaHK+aIF6Ve
g1uH+g8jnXWLxPjahkwiHxpK4XKnrIwM1h7j0q2WoE4xXLqxTZCCjjVfZN1O4UF+Ms/tXrspvtNqUgPD

OnacAXuUXzsLpLrNXp07E7aULMVhNDXXSRoYV41oujoK2wCBXZ8Wfc9k1gEmOBPTKWVfcDE88gS1myTc

3hnVyTh+uhCW0tR6SBYXc47UJ00U1UNBUbj0NrrjVa
thX6+9SKL9c5HaRo87s6ujnOc48MjUZKokeWMJJbcn03+Rv16oo6jR+ESX4quphdTPrjEfC3JpnaJ8dk

0v6ht397tjVV63nTBULVDWbHGhz0LJnSeCP3WaPfSqp/0WJkrzXJkyU7KXmlWg3tTK5HP2c9aKGekQSa

yMO1qEkhWA233+INghLMwODXqou92lgJwtPy5ccZSy
5Oc8oEIDpRkVq5hYn+vx6sjKT9H+v1hyBqL1rGDPy+8M+aI2Mh5wvO7ejvizYt+uyqKbDbCNpv19dmv8

e+BWTpjZwbLYRaP6trqif/bVuiM5M5Ye6AC1krr/6OFVUAXGb4Spxti5MRpaht69s0EqWYB7QK386UgO

k1WzbKlhwcXgnyn86ZR9esTjk7uUKBcV2EqZxrnqvM
r05PazJCzGSX+Epv3OePNjl1CxIl1xv7KTKLhq9AMukrcgkE6UmgPauZDSqO+tohvV0sK2L8CNkC2Q27

yUauzaVLDuyPlD9WyogWrlr9eCVbPD6Yo8cjyCrZMzHI+1b6hmAbXOcmJm8kwsRtriNHI2vP3vzvAZSA

8c/CJzoH5tCg8owW7XVPR+AsrFz/rDSx20GajyX/lO
gG+bE+2hDOT3FtlvMJnubDidSEX5DZv3k0C90vDvqOc2HHWuK9y9k1cXwc/S5ASBFNi+y4zhb4Wt9vYc

dRPOwSfpuHJozPiK6Zn6SQ0iy6d3CH9h7q8NtTugvcIzZXzVsb6OM2b99LrE0t5yQskW52/nhlg1USs3

0acWbQQUll0Xu27de+vxhCZPxO8ZEiUqsVjneZdWJW
Cj2e+gWwOBVVEm7CrDCJU2kJy1sVtV5lwvOEgWGnlUaiY/Ymio1yuBPkyU9cQ+OAbgj31ViAZlX7IplY

MFyxRnPxlpS/KOR5q88TLvRYZGv1BQQtAtrFYIifvXqxSKHpXhHTFfrHoH4YOE3sOcagCOcxZs+BEb+3

f3hSeTiKMSRY/kylHzsUrm/vFV+sCDEOCdqr2D8QAx
cnEJWViNDyJQvWEJ6fM7oLkdRacBeaJLXmD9vb5iKmvTGEKIQDhYV3RqEEQ9xpg6ZvQfMiHRSANZtI4z

W/PU1zMdNvdZjpGH6GMiPp023Z835nLiaY+N7Mv9MUwlvpUbYQiOCD1ChLiAr8VbCAyoHpF9qZMg9TAc

g7z55YsS0nSZVfKTdWbfonBrcK6g3wWeXhXCjZ7vUH
bkM66JTZC5sQtEfx3a+On2oRPRMXnY4ES+ICEghnQ7XbIzfYHBrlR6W7YOWydhYnzH5M59OXS4eH476y

advw1R59P5DQxVu83mtvOXl4kKpna4/pqUV9594zSo612bTlvDgt9njhRcMFNgkdEiWRqsZ0INAprjyk

y0LHOrvh/yyj7BUA22VU5RKpEGOz3goYC7zkdVrzlj
nwT/IbwSmsTrzi4naq3G919xjOQ9+RuFgUENPLb79R71Vi6W8Uc1f1wgp2MzpB1suHu7J1K8xDXgRifz

AMFu9Sj3XzcklmZupz1Hl0cWU8j3F7V+wB263iUa2yobP9xSwpd7ibLUrZdlsnsOjSejE1RQPWtxFqRw

9mBqHqAXZihcPY5dneOOoSPz7yd+08JPJb63+fgsOY
vhVVbIHZnI3ogfSkRHj/DFxAcKKf0xiKwsmwQg7iR1bgFd+2t/unnCTUG62K5LDIKfB1vfZa/fdnhue/

QUqQUO8EjuWfv5Ax4m4axjd0O4WDOVyTMS3vmXPtFY0Mz1fADIu9GmHcEtdma3NU4w9MhheEEtG6bOjt

UWDl+4oa+ky3oH13mZ8ZcHQA0E3kiB18CT3bUz9rE1
hrFC+ZGcG6aJStMQ7Cs1RPx9TEIPZFhBsLkNijrFbvaBMYfsWrs74we30DtIeKBNN3H2xgGutPwP1YV2

zSSALFUxcQ67l/XieeGgmAVZxlYM/w3JXvbY+Ovgn+k3Fm7BZvRc/lhAZWmuDiY2Rl44AAcG5tp6qQ4+

2l3HRht/cEQbLk4vNS36vBXBZCCvfznGv7zouyPjRk
vgyWf/yefpcR9a7ZISv3lfRUjk+xwBXvjlMiGi7AHZIBz9JNr+sMsPiF1HPfCuVwg6Wp4HRn9hmPDnRy

zO10TRKr6sEDV9of5WXmXppdFCyzrekAGad9MRm5ns/moqCO9KRoz5b6GR7s9RnzlxAo4pRJIxso+91P

xB/frrruW1nUnawtgmxD1GwAT3L+E6AB+drS+bm7Vm
GD4NOfjf34mV7YkBdhEs6taC0AD6/XGObDDkFDdSFGN4zLfuV4rRsgBMueoBbBQWeHtmuhBfG+QvLO4V

dZ5XN+dNpM6vk8aPvtMtgVS/AFS31wT9HbXAdhAzDjupBLGGtPDJUhEdkN+Thw2dpCSV7WGQ3hYrW4nr

5EzqE3YiGPheupp33mu8Z1//7CJk0gYCLnB7EgNSVK
k2rYzHFmGuN7D79s210Jy1tusZ1NnfqCjqq6mufsQOgISq1kM9YLtL+kAFDu1sqERUn4krn+M9kFxsE2

5ZHKd0LXs4h82tYEJ7oW+Dywc/jE6zAv2TcUJVkvOQ5GCwXBvCeP+BQ1V+MR1lnVdmKjx8uVqFW+2IDf

h7FDZF8sNRzxOQL9x4wGzTWUm19Db0mlmsSww5U/Z4
VembHrYaJLyugCq/fkvCZNDsDEpr4CVge1d2xt+Fywrtj+fuBWWkCdv53WmjgUgjq6Zh6S+bArG3xEs6

wePwAJIMCsft5bvI3EQCzx4AWrB+bSArpzp4naqn8WOVG2GBQAAezod/E59tE+iVAwq47RLMRhZi8uxT

y5eDqKwE8rc6yAktYEOXIQfaF3UhXEe6JfFuXFUQIt
FREiKROPkG1vV4XXiBx08IwdDESg7Fk8WO1VjX+n7T4lvqek2Lm9YNREwmh+IDqtGfvg8ZPdhhA4Y9BX

JIBLDUEux0ivz1Lo5jXeeaYXvu3rIj3VwMo8fXYVLWeVk5EojjIBSAv95wHuCNuwlfDgJE3wvbCGfJyf

NsD0uxbqz437hyrApwizlSbsppAyFjWPgxUp3VQd/w
H957VZTMOjkIIkSSlmidrqhjtsFF6h2gfkeH2omCX8gpFzs5yKtlEi8HnCzwZxtYNFZLiGX+VmU+hxtv

+BlLCvCAQUOe6broO+0drWsWCaO/BG+nU/KP/zttFjq6ADDjbiKhpHxmXu46NudmGed8Xp4rwqcx0++X

lpTIrDY1Q3+S+12lXhUb01qEFJ5Oh5MmGDLVd1eIe0
DofP27xQXgxmEc80IrDNQnz2Z+MdkdCw4O+35cc5qvleocQWFTUpBMc+cKO0aD0UMAXSBoVEAZKLIrmo

DagiwaksZzmVvR58+OrzlPRIm2fcRJHSPsoeozlou66KJcMrbSEx3+4oUu0ODZcj4xahvrFaFX/d6yLN

skVZi7WhGkLiJyZtXHnpXMeBaBgpeKVZyt7owkYpNc
1F94O0S0nf6a/HsGZwRybPz+jEhL3SDCMjcNeetpULrsfPIWMet/rKYqm91UwFzI5Fgg28A6cMoIyuMV

Bn75/YrW7WJFNj+cj9Po9cAWlXah/l2p+Y98Hfe4hbSp2/Yh4UDnd/diuHP/tu9AkZS+V400P6ZyPAH8

CGdxqQxm9KWvj+1ZPkt8xzGb22661wmcAUrthB3wsq
e/uuvniJf5GdABw4RJfDJ7sjjlCSZid/+Fd7mZ58/xp+3gma9/yqHMPJLcytUYDHftWHBx6LvCia8ryH

InM7XFUrK5UJ1F2Dj9NBGEGm2jIKovGQm5LtzQrYMzizRLKPMlGfP/jhCrcjvhqqJrPLBP8SbPZVTlwx

vI9yafvA2/SEjvOqG8rEHwBq27ow0lDniNZR0eMxlm
AjJ0L657b9ZJ4ckCeaVPC5JgZ3K4JrOSKZ6zJ1izklj2m7w7GXHRuMidIwtG5TxfJxX2IyVVJdD00eFM

6qizjMjz9MKUinQs16zmlTScJIBbtcJdaDCdt1RHm/YMe+JXRWlS0ewWrd9lleg35yeocJLysVZKaJRJ

H9d7ihkM0/vglqto9kzFlXnxEymTE//vJfsX/+L/b9
ByD3Wk4TtUmPjZNAV2jMcLsXO3Ucs7nZKyHzfSdYN9li30gB6gZtCTGC1ciDzkK+f2QWwwLz4t2s7Bs/

NGs5KCv9gqZpEtjerTChu3lNYqF6Y6tKXgzsDk9BTvAWoqr6yS39iHE1e+N5Zrq6a7pcm9Sn/xIijJuL

Yw9vzo4lcYDhC6jvor0VnwsJCjA6q3N1Ice1FIQttA
xkzScv6SIiJheJPitjOZOtdv3SxoQOvYS+AfJPqmRZ7f1j0/F2HK/aKjknhtYBroLHI7odcQAdo+aZAx

cak+ypHqgs9ksM384I4t17VRLJcewO7bTDgbtl3eALON2Owf23nz7yvHl77VdswPnU91oeOoTSVxfd+P

w1moBZn2i56aAot61lZrH54kWeAG1FW0S85UIu/crb
nEiiiY4qSaXVrYhURqVJIA2w8NnmKLOM3jW4CV7vOq/YztlnBJbisyrPxsHfwRzUlloHQOauW2FN/iGe

mD9W5mGKSyf9f+2TUoAEPx0bhIVzzVI1j4YBUU1lcW4L9YACcXrjfn0aPKId1kNY/hbmW/m10PaC8cUv

mAAegThJJdRUcq6BQwi74XNbPgqYtqnI4XrMOhkbVd
mQBwhqvdf77xfu5gl7EZ6kfoOhc5ILHhINSrOnoiS2N7vCJjCPH6T1xevTQRRk+A7BsMDUZcnVJWmP1v

ar8H78LdOJ0v2bkq3FrPIdS+CFt6h+l9Cpq0M1ajKBatdnwCwvzxeWxTT6bjSVoebtWsqXg9IiOZKW+G

m+5/Y7n6F/+Ta3tbUmv6qulqJgVN5H2TTnXPD7Lcqs
ISjU6ImUZfNQ4XfpEDF714/KUKR+taAU0OLocj2WOPKPXtLODAFyd9b15aF2yX3vsqHeIHw4Kdmt0njV

lB1lyEjmdka3XZk/LoVRFOE2OhK88fxYVerEiqCbAfpRCYqnp2vF+ZI5ScAbQOnfTqNH5Az70vDF7HrN

hPRJlZcj7gTBodaTNctvOimSyvA9FRO8I/sDTf+R8x
tl1WlH9OtT20eHTzTRVxIjRpORJhE0lTIUK+eDxCwLVYd7uJV1ksYr4O834AKc4D0X1W68nLMgl7CvN1

zuGaQtmYN0LR8ByLC9ZRQFQXgjI6wrPpH4FB8h7hsVJAlF/rfxX0BExN4jsUZ0q/vzthtYvIGVp+QF67

pUObORY0UU7RbSb3KVp0uqytU8YDZhcfupnndcCbEi
u2JOgwxk+MAkAABsdEeyJ3r6+kgzPDmaE615KoGfLOr/90+R9LFeMr/fYHFY7qCR9F/qBVYgPRSbzRHe

45+KcxJ45kN5/RAZ9BJxXNLy9ukzPg2l1CD/9fhLxfmR0lweXpcmySN2kLWneMsfFRcFw2gFgDPvovIX

xH3+cs7gw/HS6G+qvbz6DUYMSvcLYSq1yx+hwaKL8G
wdABgBbXD/LRyl/+9JhbQKynXgeGc+rlzBVX1XjZwMsCinRzwmw056GfD8jTrWGwzrPnPdULhSCRAR8k

R4ufV/wy3lgAItOpKI446RXfTQeIfjrHi8YzcVfydkNCq4bZ1vazrjgGZS1m6zl/i3/ey8YRdf0qGGhi

ebD6OmRb0Razis14OZN8bA3+0ZShHDoT++9sNom4Vf
dMc51tycu9MrTPZqchKGLwQbAbCROk+XmkadTXkUVLe8YWvWON8SjRaJvjrhcHe18r4DMfkZLJK9A+Q3

WEvIOyM2cIe9UdmTpg3jitCsMk+ZYg1f97vF0VfvKb3N6pNinMqLT5WQJPdQH+MUTd6qARSUeWnnxrl8

n0joSIhHHk4vsqRwQVEv1K28kIv1vgYH6iLE8xPy+Y
KsCdedWCgngRt6kOrnnax5LK/gLX+P1/p1vYP8uQ/gvvqIMrOe/SSt+eymWdM3ScMNlnj9IdUH11ps+X

Wjf8i/2NXwNXZkjgG7xJdOM7l6GuKKtyZpkbyX0I6vD+KQJU71UYj/e8qAV/ui6dH/R6mxPrc6evr/TT

1kfxU8Bg7+P5/WJNoJ1ryQgDIm3sGq/MLn5FK67BX6
RqcdCpvqVzL3jjgktQRH07F5TIpBjWbxXzAE26XxTWGYlp5eJpsOU3rYrhHPDIBGXrDGWIPzamYY8JCA

0shMX/LqzhFCUNVgvV++JCoInD+lZP/QPv+C2A/EoM67O+oy39lVg5b2TxYj4wfyDKtv9HLhOvowQhyJ

BhEbTyK1kqJYMy4rtuySrKmASKBeh43t4tUUaKKfJc
9Tf/reUHf7vY7X9PJEsEgAGZ8RmkyVI9/RNyRYpagzR5izDLd9dHp4ALDVq0/KbJe9+X70wYUE0OeEQ5

S78HWsHSgt6U0JydZ/XQYsBGmoYZhxrp/uZYP611qeTSYxKNg2PSt2KX52pH7qQoBm/w5KPYQ9uu22qB

0hVphfrhCmIKF6y54n2hPr/4KK57ihI+TsX+ZTGCS7
rspDoB/ghuYSpJOIn30mV4JlZXwmvz/ph3yrJleNvdGIRPf0fTV1Eo4EnyC8LV6wG1i1a/7unpt3Dy/l

G14lrlFKx6jbUkYrqhAXxhtd0oS0qsMsQf0fYKpeaTr8vmsz6xhvLYKFarYUjb7f86lJjF/L6yhUmLox

sDFOQlKU5uE8I47yV6OqVD5dny7M2v06nnymnvsLHw
AMAvxBTLfcqxWlP38Km7ZmpBSY2yNm3yZOpB9dcQO1j0RoQrZgHjyH3JtD+CczYE+d5uazz1aSJ7ZUc8

73BZ/HiUIflKolrRlE+f20wAaw5TtgeW/IkcJcQsgFGpruko1/0JkB1Ei1aGlVb6qItiXLjLVZVGXusI

mQphv3if7R/CjhrJESshXbU84vsgWrZ2r+0A+N72wA
tXDOESV5VbC8XDsDlaWng+pEbSqHyslHtUARm2ZvKwBPETH8cW9XjHmxIZmpRyZYnO3U2hIAYAr9nSkO

fmKGU9RytjFDbTlQB9n4Yva+Nc/Nc8rtnusNx/FWRD9id/M8wVJZhf+XmFgKraDCDVEOYLFFIppb5Jwb

5Ph4wY5d+OcDKNpgXNwuAfCJsYZ4MpgzcP3jlwIvGu
f4v572z3bXLYm8BpPWWDGfuZv7fy6BIpZnluFwZZFd8XmHUNPU/jydxLejJp060INwCqga3a7gVHP0gz

rjN0X/d2uJC/1nTvHQKMVGbKxbDwog/jhK/F2k9BqCQ0+Z5RlAVfFvc0Jz7M8FP/EmBj/obo6/0V7mlp

G7+C5WW7M/2zqT7sbfmYiAld4CvBNoC0CNeFM7J5+y
3hnQHrXgHxSs2WA8dFFTiUbg8tqjgVMKTPHsHCG7uWB2XrIPKRYfzb6KfvrQ+kp9WL/hIQX12byxxYUi

JHONcMoCtLkIjtjdWIzIFT/tbE3V79dsRtxj1Ku3Zo4UW6tIHNLwPjCw0JY0kY9alQdYCTPUcGPEdQCN

8090dFCUdBQCF2LfOmwQWckshzEg8nzeGBMJj14qTz
oNSToUcB33lOdh964HKbMiB7h7WnQmSNO+yuw6ET5i9FNiXX42WcvSudxvUgEkQHmMJqTyzgfvmhT0Kv

qyUxZuAqhyN/nyn3JYZHoFc3eNxOIXUcKIJGFNVnJslfc9Zdaen9hkZjBVesxsBBQOEVuQ/l35IA17i9

HukOWUzjJQLmzWr0qhUJTxXpPjD4ac+T28rLSfJYoo
i+PZ6fXLssWs6ZYTNPoIz9drot0gsnpYrfQDu0/9AtuCriWenu1b7+7ZPN95YX9qD3CPCfCmxv4ywk2g

5An37VxGcHU8QnB1ew34S37e1ZjsS4gK02CQHswi3wp8tNvvYlQRo5glHndo2jrDEtQPMTzMkBO2mFBW

D5dzayB0PARq1pYxhKhG42eknqRf9AHcl2GFv+zvta
hs8K81rVIeQp1Mq7LHQ4YsHFPAm9jh0HaZ5e3bHWo28QpTKhpGPS09qkuoB7iE9wpUp1RJFqkv2WMXwQ

iqeBy9kK27wh1EcEQLuG9MOFRnan8yaSkNqFgZQ+2St7esEhPe9NUATHB8+av2HZN+5c8LUMbwMvDDQp

3CEtBBMHLTc2jmirSJablxz1poWpsDp1MIIbbme7rc
zvbirKfz2wAi2G+c9MzPiqonB9dl0GvUmSOxkalbzI2jsZOJSxDkm82oJxhqwNav7POBcX1U1i0ApgN/

Yj7CSp8X/8+QbpXN2s2xF/g/6tewD/LFtOY/g2HWBxcxVfSw5M+n1NS1sUUUH8SqZ9pmLgewxlpQsIWh

GrvFppaTFiHJDVsZqsve942eCU0UUroa7DefkGbfBv
h/+UY3EUTYdyeS40j+HpJVDvstaHull9B21HYk6V51RcxIi6k3Pt522atgDFT6KfVNHRsfpoPuyN2jto

dqztjZQ1mmJOkDOuk4NKDPZb/Mv9l/sv91/uv9x/uf9y/+X+Z0IkbRqjZfv6puzueKH3/dFS/jCBx+x/

47/7hgs7LQoyo3cljVTs6O8cAfPdV8mXNfPmedSCdf
0OzGfTxvGqx6nh+fRosrNd2zwh5ixpSOl+c0rgCw8EOJU0H+nmTRCFYzWMNUaWCFY/YsvVmHU27irc+/

IkWqiCC8rFkP1UmWTMA5Vj/KmpNAdlVKpYhYLq7/RMjOLUAlcp1XAF0vuAH+g93ZgpNgi8BS+imYGnPC

PQQkwNoVOuoMQTtbi0O6Hp/hajGBJ0keUPJqqpLOF6
s9omdc5Y8A6t5JfIH8ZDToynghYJQxc18mlWnaBDlNS5koIBX6vfp4LiIUpRvcDsq+nAsC122UZQqfJC

eFQlmBfWuUGaqOi5NwuiR/uK65RATKdHS+hpe9DEc0yLLz+v5mvg69LNUoga9jtT1b8mEJPy+86wCFiN

sotg1y7ZcYUQod23odbc7mJiL6rMuknog8NW+oJ6G1
9uChYi7Eg2cEAqRri0ctSUvFYS2FVoypy56VtfgHvOCRkdBf9MqSMEaW3DZuu9ntna88SuXsfa/wVgdV

iWh0sWg1UgShyEIOaWQ6SnStzZwNAgRskTbw8//0GAzIjsnD7UUc58+s+V5F5bA548uRb4x5V/hocqMo

ze6hM22HXYe8cTeqhQ2liHhgRpOOU0qyW/EqGdm9KC
a1xPmARI8mRAWNx9PItYGsdCHTxVuhNBwEjIhLP6GUkPioeIcZivyPpgKe/4A9ziv9L4N35l0NwN4511

2td13c++e0qut1IdsMcihm8I/0WZr7l5Y11m6At5kuqUVa5M018+J4xxJvTbKM4dqxfFawwRo+7Fm04l

U1/0aK348v1NYYgr094ZqCBgLQFD42XJ/P4I+IFVaH
UZ0dTNU2bFPfB124CifXlxp334BdI204/wn9ieUgoqhbVacLinXmc5Ee5y2cvp8bhLJYDCgdX2z3hmbh

prs13mZ51rKybzoWtfNvYDLMalDCcjQAPROjcuILf8KLz2tgohu9sNbscJji70iXruFYZAjcRHm3yAW9

ezYTjpeNSTvR7B1y7V+s5ni5Zhi7dEtIsgXOFR22cl
Yzhaku13w4ShTW6nPoY+0JkbJK0uWNTkCUFQN79mNYv75F7zWTpm20PpbEdjA9lR6XXW7/tTjS9VL3Bg

I+8xXldeXkc/LfrnSgMDq1oR5ExKfM/hmvcEnuNGeuzl9Oqlp+dBW30EnK3pf40nEbg1O2i6m27/S+1Z

3xnNkqOK7SVEJHy3Jk2vtRV/5l/Y419gWZ5iHeWS1t
k/RLR0+3GwX5Bzr48bjg1wEjO2cX80UR2P847GOdXIaiMa0VvqUXhcXvW/rCSKzBCR6Mt36RL3Ya2B27

afKjynCCgwhsb6JQh24Y34Pty4bql6F+vka3bwSW1hBl84t6vxUWMAmYRGeXfvbr92iBN5Mwb8uyXkw1

P792iUaq0PcQiNd+k+Fw3NQOQz4TnwjQ8zcMtwLvMI
xcIVvUY59TnUmQ2GnYjUYAfwOtRpT4/PE7+pH6rzjPATGSoFQcrOIZIhI7HnYEK4gV4CMbje0nNS/uts

j3meKrctX7P1cEKQLSBhPkBj2H0rk7uNjSpqIF+bjbH4mDkqKFdcoAjAtuN/p2akca0l3xt/x36FBBRt

RhC5vhT48WWVzyjIX7d0VJGUem4dwshHqiH5u4U/iX
7/beb1zgJc7TvGqIbngELwG9zRUbvM6ayBWyArmW4MFV7V+uDbb+Jw+hY7T9FpEnDXzDy+LBxDUy4/NB

T+1oGcNQxf6o1xfEZBZdOS79olZUcVTFxzkOliNaUyLHgmumiGpAAYbXijxC2b/ZZOxF/HZ3GKLVbLgK

9bcJwt6gSl7wDp9wXfS7SJmTmHyKNA2JwDvoHVBsXd
UiNihws239Mo9Xree3AkYL6vj1paFVtrc2ocTs96hDvtxPYxaTerttm4H8WdmxJaMz4duzfyCZv1dZZI

6/zfrKHLSfAxeUXIMUinhzAGaWI1RwYoJKh9Kpm+xGxaCxrYyAoK72OhypufyzMNLhzcQJ5tZcmcfdAw

xm84lbnwrGNx8ctIyOOe1vwmRC026V4xEO5rbPtEWf
BgEIlg1zdDZGwflRi2j8vp7uTEaqat7lpZ+8pPIPJUSdhx5bHw9LbaPTO4BL6yIqyb2IaPEisr9+pOVq

wG/YaH3dwk5AudXNkmvy7oDEq2wVBoIKFy5z2x627hxZuD5DR1kzi6Vmlsd3OizgybrIDuhyXUF+0nDv

e6jxQ8awzGayJdMD9OtSvelE3Vs+5x1w0gRSRgz1cF
RwDLWI/G8wvXLgop9uQlDg2UIB6bMbsj+WAbeBHS9Q+Wfx/ZU6JHAL5i+BNDtDmGBz89LUOjS9yBvF1k

b+kd+deW+1R4TlQmzxDPG7lImIB93T6v3TJfFMvY7CEGcJtQItC9jJji0Iyey9Sn9/ue+jl9NYXu82fW

bs2O0peSLGF994y5cGV4RvY2SDW6RaiDU2ZJ9AA1ez
K2vP29YLX4u165mWw9L0+wDBNEcQwtJd7vamU55EMOXKBt4kcK3QymZc0Vn6omg32iBsnl7iD4bJdDYK

1hK9BTD3RiwMEtfhFATyAyYc3WET7cb+LwhS52Q633oZKwdJr42BTL3Dp9CCM8LXGkTlG8wVhENRaF0D

/f7sjkB7y5i4woAxya2G3VGqvzYg3dJ6/qyo7T7waC
gUdrL/odJP2EQfyavcCqgC3XPBy7bGzRct64X7WIEK4h15VLev6han8YkCQtz+e1dmWZHeYejKuu39BI

Pj7KRGx/WWUueD1SA+UEWDUemnZeIPVdiAAB1h9KWEiytWYzsnzLctW35w3LFBhLKsmvG3XQXrv9Plyx

QyGYdo7HKH0B70z2DgsPTPcRoP3X3qg7m7HOgao6rF
7mvX4aMEogl5W8VwIJ+h/1dK9qmOyWqPz6rZuve/PHzpaAdFTylmOj2TblaoR/uruEF6mB4NzylOOF32

tPavJweGxfEzJG9iSYdS1tURZeULCtxK62CqZM+6icGrf7PVQjS5o6G6kSXo/Rp4oSi5g6/VZUpA1pyf

ltBgp8fScrQ6sLmE4q6ca4g9pA+LQaOTOr7jHOWviO
gvKMz672pQJYii21xl8W9JawXTDXg+G6Vx/OrPG1rhUaVpAd54AyF/EWsCbafKQmysg2HjE+3beJ0uFW

q84EdF0pAI60d8HO6cj8pHXjjevdwkajl1vq4kA1l4NOsrAo3Rtwq3KMgvMakjDKwW+kOStc5rcPLU8T

S4APCK2+8MVo4DQcO9zLcicC8JHoM6FVCPAP3wwDLW
dSbxNq6ANoYnlcaVyDnr46xQOMo2MeehARfantpZ4FqDhEMxVpAPUhU57FEK7bzA/87KCqISt9vwY/xT

jqy0bpxuFJ7XdFwlVWDBAfC+tdtEftMO2SXk7KY7CP4wHHbqADk5nGpR3t4P/EZcqQKzTeBtEiOwZsYr

ftFWK2MJXLaSN8Nvgoh/kLrFeOVI+A38T+f07C2osK
namUC4bdVXzTANdpNvnqB5KeXNq2GCO9MJXNPq6rf6L41lvX7YJ8P87N+4PX06co59JQBoHHv/0v0Ay/

pooH2S3EUBJnX29chvL+iN16Yrjz75wgIq5y4m/8iIQHFEjv4orlvZp/XuLADku0seuIsjV66HsUqpAP

AWMo13CsNmsppoBDWJxe4GKn/mJnN+MdYaRTW+leij
low2NPH/RvcZ5mCaaJfZcHyueaDzszc3KPAno87i0Bs7BAhxxvfkOqdz8Pkkk9cDVx2bcWwx+cKbst19

zl019k6oc9F90DiRCaeTNpJ28euH8aAf++5yZ4txLz/wMdidBuKESwxjpkuO2phiubPrFQFt6CAyAld5

JZ0LXea+8BwJP6nGM9c8m+3LxTrf4w4oZcdCMOAnRK
EvWU6ipMEOfHGtY6T/hMCU+SVJwXys7pb2vYn3XA9lxNnjDboyWfTp3j3c5eSrzohvw9Lx8FzPa/o/5a

eOm0eW0hjnabL6LI67wW/KaMj61LbtvlrWlQYiFTeZ2jmjuExvxJgT7biPoDzLeHQjFXZFo3LU+1+5VM

06nZh2yvJvBbkp9QyTgrfXA/L5v1nqJL9LWbjk/Zxs
BB9vEU9I9yAg+70UYczN9pdO7oiM6DKjCergZTq0I4Y/TutaxpEo7EcSn/ix71tbBgyXcFNVMTINP1L9

aEUVKenREBeA1H+9EmTuXkIbtOh/7YwHw+KyK9du47LeI4+GF9yLaD4u73jMeoC3DY3e09Rz/9A3Q1rL

9E50xPvyDr0PWDPrkklOCUYeMkX1KqaB8bMQxc2Jfw
G+Ycy+UmcYVzpxaicBviOvAqgBYGqHf/525SL1+MVgeABcECkDZKu3GPOnQ++CUo319/VnsG37a/VJgQ

zQtF+K/FSMz1GIAiIe33s/Xqx3lex7C+GKbCTiyDr5yM98lVMBnWv6MtZen9mGnThP5+ptuV3SH03Wti

kBwC6irpYOKonZsFE7dCTIYqHX1Lb/qRKxdn2ZbEMR
auhTeqF2z+AP0598k2E/VA7emyJd9g6/xRqx5KWda6LqNquUyVrLwF3uXJF6kkKdO41kg7HeK/kGxYXo

XqqetK2kW4NZSVWfQtsJlpjZI5VsG/w5HRSAMpqNhkmll3/2kc3cIaNTwObd1COsLqUBCPlXURjQcnlY

BpMeKHkPicG5Sl+vA6HXFbmaAmzcK4x84RtZ8q8hyK
zTpEImFlcxa6VPY2AX2xlbjjDamUyGvH2aMIJJ8nDCOUSwDsD+5p+32YH+IfhEspYtWk51wUjZpO5unf

ri7dUmetcrfGIEnu6Iogy8NRdbbu907XQGNOtho8WcTLaUbHerq+hXGMSFewqZXtWjYTRfhuL5qbpBjB

UoL60SzUXK/0PxfwHlNgrZmGVUrgVgDoCK4N41Wkw1
mWugX+YLUxpXHvV31qrSt3v49adbHIN/gF3DXXtbVwvtUKtj6iAjep5Hmu1ynwC6iYoXOlFgYo9MnGNc

6WwLfmwbiD/KF/mGIK/vJLhZHD+zfBH39jAqBOc/6NYE/u32wBrT4ygdXemAfTQx8n5rxjRXzKBAsb5C

xbhUNodBg2q6b6CN6XDkXkKjWYGwnHwa1yQACx/U1K
kUJxXsjK/umwGgY0pvCgcwdAlB4M9Mal/DK2NdI3rJv8gXrHlZJyAFjlwTQePASNuNE7tDAZnWd5whnu

gGCXHZCCK9K8wU5bRNS86/GbB7mFI0bOpc8+XzaDJ/7moLCcW1/Q2AP19VLy/AjA5i/SBceqN+kYnhUO

ldJ1Rstt+VnIrF6YMBdJSJ6V4S/1/G8GXBAZh88Pp8
Tp8f1ol2nO+cReQsXFywxcu9CzGKJCALbFKlz436O9mGGVeVhBbv2orDxw4zQLQAzMOBncfRQhc10JvR

ZatVdQdEv9PsJmzo6FO3OL8/pSWDka0JtXYpMPpD+qPVf0hL6xBmdO1x2mdcCnMyeXezQ5m5FbM3xtJ8

eCXWdYnpqnPPrQaXAx74UD2vaRD63JH4dIzZeChn+X
WAWdvrRDLTmTMfmxhlqpJOQV2uuzD8aW6ETtFFTgd7/NL8Dov/hKdXc+uV5sIE/xm7CH38ywcVuYJBkl

LjitAAGO0na/+r5UdXhjPy1sPpnMcn+p7hnVC1GgoLsspVJLS7bwFGC8FPUio//lSGsZQbRmxgngq3ox

FGzWmtGDoUDmI48xD5VBU9qmoG3hel5N5xNQOQwOQ1
/1TTOzy6VRYaIGA3RxVKEi039s+8WrpXAOzVUViMIHSP5qGW+A+w0qtsw4IjVGUzmKGn1QxjXq5hFQAz

K6I7F8VX3KyxDJ3GiUHWaSwShc5MFXfetYxjz9z1oYklJwof2QEEp5MvR/2/2RkLmiTlcexakTFo1WMo

/I/itkb9zJvA4ULGKd2P3hU32wZIHdyHYz7uKOp1yK
5p6Tkay4jtafJIh6RZe6tu9gHxuovXeh0C4TrBDwbFOvntmonwtytej7q/pCCS+opDkrBJrbAQNHgLnE

csrTgV5114817K3pfrWIA6E0yPDLXW+S6lXewHMT9lfzi4j2C/Y+BiCnIQTfQfSPFCpm7sp3Ate1VutG

/qNpX8StA2PEG06PvHTxHPt64MYx+jqXFIpm3+zU55
ZuN3/3Vdhu1jCiWbuqBfjMMBsG4aOY4zO4PHo4S+FIlEQ3OWdTQdfZ9dWHzQ6cThlSr/MIDBv485kfk8

64lNI3hCJ6LL6eI1eW99ohJVJvn+J516XTO9TOEnqUDHMdXxdL0nGq2rIRSfLOjq7/SGm2+Tr8gbruWz

F8RHJvHwpfwP1pv8dzHgr8xVVy33gRSB9fGKMEHQxg
boejROzboBIWuNEmuMLv0wu7cSUKmg+uPrrYLNymHV57003P4TU7e1784Y2U2OKDvtoQ8hfsWI9G9EP7

04kZ+6i5DCKgMXp5aPRRB2tMIYmUNUpNPB51DE5UkHIQxxgnhCvSXR8/Mf1fAlvjOffkbNtjehwr/25N

8575tLGjpCWQZDFvNaN7hco5pFm0x018ho0+c/lA4C
gUAmMMHgCdQlc6bpx2FlzsfRy8qcYw/K3VWvE/n3QRwmihgRcykWky5VBZm/1AhS08Qz9QlJ6DFUMa4i

2JMNm6JzeT0t9ab/78q17cknI3QbWmyamovh53Bf2IWxtBVz/A5XWJ9/1iWVfh8iyZPO/Lb4YV69rkmy

6hG/gI4DhqU7fXMw0dtjuZdK458x6dxgwSSLxk/FV2
sJBCPSkqwSK8O95ki9vE2Wz9fr5k8qow713/4fAhtaQBkYjznFKA6YAFhdgOpdXIGTgGlnkOaj1DJGub

iwvDpKQf4LCVJdCVm0apFyo58KHkda9Cl4p+ZUJt6o+C+snlJc+JJQpSwdtQWy/nQaHrrpP3HSdrZTMM

v4bmws7rW4KsXV8WI5vzs77kqob3xVnaShtSdMDNXN
7d+7kvDQtGBtEu1bF5MPhQ60/u9IFi809f0bw+CZ0jKmuzgkK5x1Ns3lMKOE30UjgyHJC8UD2ozHbZpy

cz5DuMqw+PS56739O2G7UTPe7+pfzRqfRIjXSee7xXWWRmA/H3HzEQb0J64HedEUhBfQaeRBGzDuRbRI

ksymavr3f7TOrVvhkKz8pz8bcRvvpqEueKTrYx/tc2
IByY5krCNA2IVn+ephZ/MZiAfXPaAe+dky/pTmV1v/7c0+rCcjGXKc6O7mmT/Ifcetd4f5GroAzfJxJp

eqG28G1BbAUPJ5RiL4p4h4GPjqS5Cc3uZsCbX83feq4yf2YXObX+tzyvuauu4sOVVLtbLU7r24llj/XI

zg2uPBKrAqe1he9ZpB/O6h4hpoyHDXpzGZm5UY1CZ/
dpS44TcErgXnMbQFi31BMt14C261wjB0SfPabtSxfxAaftTXttAhiAiTyTPwx7I7dVaQz+zeA8nFt2eR

agcDvYg878lrIFAYQBzcUYCir7C/L2suiDnunhn4DHHSFzmdi36/zWFpP3na90cKWGwuFHKI69QwTJMi

xgNwgigUjPjfY9133ZcvFIpyT/9TJink5sbRg5uPxs
Pako/h252ZV0qQkVixIm61Tl2we3XqS+m/AmVbY/2L6ERqoH+Q0sQzfoeVoco9QNHD2wnU3AmKCwMO2t

kw6re3Je6JO3XRPJO3LXKDAqgURhY/gS09p30BdXCrJ5NooX1/HPfR3RhQvDRJ2HwwLl8xkrEGtgo3Pm

7NSaOAm/2oX1OddVkGPX1ZdsIvyFIgu2J+ow3CzwCI
qj404PK53K/e2nsFb7r/wpcPtTNRgTFKiK7RNOJVB0OJzoL5/oVsF8opEss+IqCG+1tQcpx0oiO2IgnL

7UR5vejxnHhguig5tFZIzy8/ui/UPGsOJUxhopP4YZ1UsVkiw/MgQnzrIW+h6GqgsajBatvJv1ysmlj0

hq2OpzNLXkbsNfzdB/KqSVbtXfBH3hB6VRvdPv4bZl
9YHKE6/Ca7/c53kV3wFZuspUQOF8+I/R7CmVUOqV5skje1u6BcPdDXYK8xpH04r5n0PwgT3/j+1T/LDm

BDsHuTD20WuR28Hss86NSL+QQYIOPm6agpWne8sKFX18xz+Gv67DB6AIBRMRjK3b4YFO5cYhO8RyKqrO

Z13xszWo9ovl5W67/sLfN+vuHEcIwPWhnsm5EJRBqr
jWc3bK0JJvk78bhtRfpdSJfBpMauRJGNd4QxgiN2zYcW0rbDxQu71YuB6flGpJTGqcb4nRfVFPjra8cu

j0y2jU36ATjJ/TYznx31HW81gqgQL9zZp/gy/il6eT9k5DBBXhE7daH9JTd1wz9B839Fd+LkTcugCSc1

y+Selena/9rmW+MdneXukp5kGi9dfW3yFeAS+8OmGsUoX
tKkgC7zPq/GAaMfLTiAVT85Q9nAMuVVEr1X7HPzAKbjuWmsnHfhRNBMTasPBDBCbFm7tviBm95AaxTsy

gLXCOp87CMtIhqA4yCGLH1fALPs0ldqGscGjes3UiRsi2a6bIvj8Hkmchv3bEkGoT/WARQPgv1m8HW68

FI3kJ2//yyJEa4+OURW2RaAPZzCWerYO6/nH3Bend8
VIhEvyKfesOUGEqsPeG186Rs8J5v9ODvK1IC6oUeO0gXVv/abyVi94WZt85eva4B5EehoRo8ZCxB2BWs

ZI28unCLZ28sTmSQeq2MOJXCYQpqm0Sgdg6F9jZa3QirgVHFd6SKARCnQdItu/1qIE7RCps+6mbcuqR9

MlObd9l6SBFpftHcbTyknv6IuSIxi8FX/XZ9zhrf1C
i2e2x0x0divjzQnX7Xg6CEr0xg3SLvrgb+LBsrkY0Rbyy/Wr5+Gly2SwEMjqfatlRF0l1g6MWl/fuWCI

FWNeW7VhYbr7wmCDc6c4F6Qn3edD10+/mG28hAbDnsn6B4pnjkOves20h02fM2KmwxV2bm6AAcrFeGX6

9TXJNENHygdvcHGxfRYyCMpjuCUGUVojsZgH/b1e5I
VdKjpUrkLS0Gr1s2E/kqXjtaOwPJOT3a1CpH7G1xFPz6/Pz/acaDgePpner/uvmcx+57vgeunfJXPayW

zAfkl2HNUuobt9V0za28KElYv27X6mYl04hCGyDylLjeaM2WmDUlXRkOUcWbuaYtyuTepn0pQD/L4W95

nHGr4ItqrNLxXeBZyRoxrwgDF8EUNg1s1lC0IYXsY0
6F9vewv+8nUPwlPEExb1r0BdslSNbVjkLsUM4t9DbdHvHXajnejdIDn5Hu+YEd38Z8MQ8gTtJtokMVo1

amr5U9TEx7ySA6D1uGjQ6Z8MAN5mDE/MwDR3Ldnv/dL7dLOBjXJKfQxhJZHNbuVQKiaaNVdAjiR29g1g

oqfJfLSPdMX791f86eDMrdsf+pRjNNwyZbRUwGCf7e
XnTWSFXjt7y53cXpvdmovP+eca/ktqCZ7ggcQHU/DovQ0lglffFerDaZ/AMn6HI1sapiHaRmLM89P3iT

ZX0/oBeIlOy8tYY+rxgrlOIggq92+8w2nvCUNE8PFzz9HUS5DCKo8+LaH4awzIavx+z+8kj06OCHQENR

LxfbFg+M4CD6ywNjXJTCX0q1xsCrMJpsbqLjA2cMZ3
Kvm4btlXep0ANmMqswDGFGhT2mmk3idrAK0WKqaGO82+knLz5ePYt4qHV5qR7h+mi5P7xLfR5lBpTzWU

R8/DSScGJmzwEp4/zWsUtIKqV7b+Eh9GhP1EiHh/9F/fLLjM0P2Kh8S/fJPZmimH+uRd0NdWKinphi43

vP1RFiw2pXwsrLf2aV/xYZf10LH3ZUiM8K46FuIAc0
bgUjFHJ/fhOcFgazuY3qXr/Ow3fP+eBBPIrI8WKTfNopgxmyKdGJTk5gOTIRGzlhvF0T2JgY+IkyiHC0

IOoLVM7lUv1eLmXQyOKI35cqxVmtDtDs++2YXVQ8ttdMwnXRYEyXj4USxNkdlb5jMpLEk/NxzrjpNaZz

al2Li6EamWyTUnEYHNWNJEdXJKBYJ4K9p6Qt2/vHAY
n/vbdz9Aj9MuxE8r7BYzTP0hmyvo2LRZe49SINPQ51lRMRgJ82iF+W8ylmSL16ciL0SniHgXdI0Ihdbp

+WtTSe0KphpQl6a1ulyWakWKoRZy0rjJaWxfdR2WQzGQeG0Ot8fhxaM3scakWFzgPTb8FY9h5/dVtb34

Viw1PMOkaAr1SbZO+nUs5lSttdyN4kadjaylCg4VeI
GoAi8UgPgAOH9SzO6tD6+aqAaG9pYe/9DErFgGngbGleRRBRbXTVTH7UfwCcWZSgZ4bXsGsAuP+eVccq

kMi84DbDHeXdtZ7BlKB5YN1y+7n1ZGSQKa28lpwozJ/jDOk+ZtnIoDYOzGJRy8lVZCWysgNAkMuUsGtx

xUWKs8LgeVjlJnfcUo7T3vKSwCw63ge6Y64YZKZFs0
7eR/txRLKAmq2xXdIeOxHnQ/M0I/x1ip2JfQmx8jAOM1neaI7u/IoLfNgsf6Ge7/AhT6NfFNlq3fxT8n

Vn7EC62mzXwOT5cy2yGEvQGKm/sO7EilUtt1BPlsC2ywn2nsKXPdibSgs+9B6SsyAMtWXWYDDCT5rnRP

3UScfSVaPgzyeY412fVFsMElZ4UTr6chqpeklOZ6Ap
5pXR1i3T/ULY3BfrvX2M0lfHwzJaj598+t9lsHFKz0tCJgvo3UsXgJSM1iPfsJV0jTCvUTHUJQ5Ghm0O

2mLkbSbb6+RdTnZMchFJdBes0g7KuBMQLRzr8m0pz3P2nC1Kj16IBfJFt63rDuMZyLxjahO1e3EpWmiY

gq5/b1n8Giz9p8cKDHQQI9IdIZOQqoYz2qY5d1xkie
9ZtD5xeZEB440Yhkl6zqJ7xpz7xCi0K0UKh7Towgvt+s64hUwpHzxv3F4ob4D+9+VVKjJ6mOXlIpcels

IfPvnmL71jcQnB9X2Vi+jZrp6KdhD+Vb97xKSkLk7nGmN5A5CkIV0ouh9RJl87ZCsG79dMz5vhw/DzsY

XVUC6JePIPvYxlxUtgP/njDe9VmZm47ZH2cy9s0fmh
3WbyIgQFHBHHiD40hUPw4xvnWXO6QMBtr8jQLzynPVlEx7nvCpmS1I7gXKIz4a+0dVOphnlui6aQFT+s

GAY5Dzw5keeKj8mvbs+v+PlSO+41pVwWvtvzOOHNoqBy/gORS6peNr+Q1bSN0gRueUg9XOd03XbhO+6M

jC2Mu3npFlYrvnItX0ZgL9otNIIYPr3440lUBs+edW
7EitM+7BFY23bHl44lSeFzSy72K3tDlN6tj4gkq5n++u5sSJQsfvYp8Mk2Ex7U3QfbsbtaL/Eqi9mheK

eDItrD5buIjn1SZUSE8zAehflvza+WHcct0BInqjDWy/DjAk+StwmJ3GcaP/BYt96h23jGhQOl0bSi4y

EbH9XoKnjnItOBDLp8pOe2wR6+KFm9Qo1ojXep4pkF
TzfYfzYi5C5XuKxFqydU6UtTJQ/efkwXgagucQPVBAxrbteD6NZC5XjjKw4OrZOH1XIODSwHTbyLOR+F

r5rJq10dXBnK3tlli7Wn3idB215+qrojjZ1AttFuSzSH3w9WJQO39wKZUEyktU2QfxLE7CfW7TP24Ijz

0QPuwwE9Jhlr8Fe0c4SX5BH3c1rRkx8jb+lep09vUC
BgLeaiqA+t9DOwCvHv3fxCf6J1pBgA2cEN7cCW3yqEhHy/k8NAjVujPrYtPc3wPRn9xT6zTGL/S+nOS5

HXtopbxgQ0xF/UO8qfntGW64SFrbrVbCRDfKbk+gFX3MqBKke/0ObHSp8xrCc4BeakBMf09W7LcVRaXd

v91bbIUItmQs1z6sRh61F7jP79IkCQoNd+fraRyxqD
ydH58kxBDv/Zas73JNueZMjHW/TrWdpgf4u5CfJx1nknSpLWytKOn6O1YQbyGQlgyn9DjkwMxaXy+7H3

XP9TtRJKJ5iugC6xBE7hhHqiOCY6JgO1s5szidbiJC06glPjIc1DutlxiayfYiWMJ8qkgED5ygGe9pGt

t6yUQtzyi8jJ0GV2JPev9F9VFfwcPZoASaCsQqCpNE
BrAXHK1mMG7//wYvKeIepAHY/uDaq0zKhyV89QkTGs6/70RLQ1LXk2dQoPUWZG2xjWeEiE1t4h0EPjqJ

CpYk3Cs0I1vK3CgC2FRk16ph2eOjRXqIBLlEYXeHxXRnNfdpNp3cCmYB2zeorijxv75Gsd8PFqkebD1+

grKOzluToR4FgWBQ2jIuEzn6n0QgiNqI3xDJCcQ26y
oQcMs0DnRlXP484OOT02YKEGSOAQ6c05dmFexESGOZWViZlZmuNoGO0Un13thWlRGKUzziXdWPqlPndh

nzD6ffRkFtGmhtF/8LaTccbvs7QqLP7C67iW3CwcVYW5TUTnajOdfp/LtD7PH9JkU43Ur+L0NS1M6Wru

UgNVgWaiIeuk2oDpWlXIoTiHPXxI6+sWeW+mKk/7fq
T8qcXYihaJ9SClmFGeI28ksO/N61gcp5pwh89+KPWovbEgm8tAxRXTkN+d2qYC87Bt6RykmKGdIPflTa

k1qjWlDIQ+/aXp/17LdXyUQPurcTTdbumMHgvEdDXp0ViwxjUdtlv6O87OwHpGtR5XXoP9E7mi7tAwDx

kmYn86tc0kyBsBaq+/ipKLzp1KtGvnhd4xZH2DDXhf
i+ndMY7nbefuWZ1KABevFGz+iRwRRAP9QVaabod5O4vwjf2bL2Ah+mRmjVCSoOyRTgNs+isugnvyZ+Sl

XxnP00TVbVtZO0ifw4wLP7k6FuKkBQ1HlKmgwTNJ6DC92AWTWDd2aaJlnnIczdQAh/FLQlsZAVVqIX8I

gIlfQWO+UdBX2kMGdnVkYgohq2EBsSGV3j4Fcf4NVI
qBp0r9K3q686Pf4k8uy7U+z27paeFvFs+JLNB2Ig+KxlZRFLImFmtckJ32fu/saWqV/UunQh6KyLyWrJ

Lf8BijL+RuQHTR7QiyVqerduqmqpEgdtDhDQu9zr71gVfo8DLOfw4d/hLVqXYu28iyqGgCKn7bnyL76v

1LMJPXnzREl1Rz50oH20s7ABAT8tuPEgUAH5JOaFpp
wtczmsj6l/JYN7ZJs8SpzxrZpZFJ0KRbbk0PVZ2K4mrnl9HO1+xIaqYeZorXwdCamNXfp4d1S603viGk

HMg3CIczxEOO+7UvaS/ZviYPnlh4mHA7Vk0G4R2ghkfWDR3jVSdeai7/3azFKTGRP/dmTw+rvRAuVBGg

8KAd8OWqC/smDMzLhErL0GJrBod/MTYy0NL6ajZeGG
D34rWL2dp6kbVTScwN0OkGSLA58x095Q2zBkSL38hXLeYZgfAYNthw5mF9CXxwzjtW9TVqtWEUlnkPK3

Z+EBhRPoOKic6QheOF/76TZNjJjB2YNuNUu1ah0plwo3muE0juBJb03yFRUzASoI3IuWzRHwBRrh1ouS

0/2Uqt72WEVEHyD6hEsAj4cLP0zOc3qInU53v5HnaR
SqrAyHkcwh0R74C5m0FD+k0VVuCBogmYs0kYkimkSFJe44myiaAZZ3hRVGwhOoplw2a7PtpqiFNo/3gB

Lqf2IcnMiT/0OBj0I0PQNsKkc5XX7YxMzI5KNmcuvKLBrXaWxgkcNt9nAaaOWqKhKXvSStOcFr86JR3f

S9TO2e0VzGT1vuEg178gcWe3nUw3PcGShGFU00yh0O
aNn0k0c9Twq9kfOXZSQbfpBf7q3+sbUWzT/OD2MS2OCuMsmsWWN9+9Ws3IZqsEu79t+l47oC+ZZs14FY

2pq6pqjXPkX5JfOZ2S4tqObFcmRBCMaqnnaRjDURoVvESLDgp2NoGb6fgOX1k9vZsfY6/MZkfb3sblH/

Dtd8QOIcEsyEDft6z229tdNMriFN189cwuB9hVLYCf
/r7glK3qSXuvVH8AoLGjfE949pX4vqT9XcMc2JYdZz9JjNTdosrj1qWjLXNT09e4+1r+M966d6+T37kB

igF9JsGgyHCp5eyanI6RXnL9imRishAN+R6L5bOdaWnPd5rWBFeAxMTjBx6LAKoQcRHHqD0EicGxhQyY

/M3VtKfCeWYsTNT8Qjmq+me9FW5uEUNXd9al/iFsip
EaUkhcVOdFqcBDSg2tXvegbyMus3+IOXZz3RdnkPIYb60p0s+JSRyUTdPgnks35RB3NK8vG7QWNyZcCd

KrEhTiPOPHzm7A3bMqwFpKRA/XyK8mZCzglSqE39rDPo/CoTK2zgF5aVmtSSjgwXTDz208CvPwDVk+sx

EJfshPBTaPIzjrcjoDeIC1BNLo4dmo71HEDvyj1WD6
ODkPSHRWw2TgruJR590z1+Rrr8V30lzU417Cs65azM9fIVptsvp8U6Z/5AN+3ybO9WbVutfmndP5KsCr

SKhZCoCFFMmPzxr+0yCF+aY8O5C43/nOVKPWw91pPaBhNw4KCQKPTP7WjjIBoYuaIdtt+QQWon8unIW9

anXAbJI6PpCaabNi9+NxAuhbEr+d7A/8iOS+CoZnOI
Qfiyx6S2GcWOAVggM2ETraiu3nYqcT/374Pp+c+E4R/KFnFYZXjHu4cW6sxsflfxpDRvKsTeynxBQU14

NLYxP1ydCuPwD2KQydGsEfUuPcGnA9Rb1LUcxtS0qDt9sx9fR2imEDk494xRml49UKnE33FiR5jEejBM

uylv7aRFff6XYnR2SbYz7gT7PZpHzIlBfjlEj6D7Wn
lUCAlUdQGl/hFaJOhczsGarU6MP4ZbUv7VZGqdAtiIIY4mFOP7/TZCayw8kroO+ai1RNaexzvQieZnnf

+B4o3fNx4KvZ+xHdeHBTGBuIqWLP/M5e9ih2z8rmW0wFwBI1bCLvX60TZssYzyv7J6Z3CRzKsrcQffBb

NDdKNOe+sekcbt61xjMMSXg1RFe5T1n9xULanrDi6r
71rYpMpoa/AEilqQ/ZA7tQeXc034EI8KGX5vS4NTaWjIPDgmphvHpDPEGTewDzbYS7NaJaE+drGnWJHb

Weivdkr5YFnjmxIQ+UPy65UnoerGqQ62mKV6qFPdMKpVpajUc5+VydzYAT3wyOMT+5J2P96iM6EP20M9

oiVuylmjO8NotWu3wbJhitwrCjl6GjgJrzEr8cTWuh
Wc5cxxIccwOAccaJr1tNemWEiSka/3pQTH0IbUmHJ74pobVR2DildVoXPLp5YluQ+hJi9g5cj438kj86

A2HJcjDIZpUIPxYdE7MSrbqwWTiyKr1rsuWzy/TtKR+mUQVFgLqpnUwM21I2LE2W8GlyQ1ybkXZPlZcW

QbEI4Rq17u+BUirsh5sOk/9Qw/zUhoJFPMw2YbeSJL
1KsoA4S3inxSPg6uLpzVHuCHpN2b4J7hXv2C1G5Ir5vyaIs0J6JTIsvcF0q6xYmivQS4mU103o624Fnn

zkaqL3hk83XOl9o5/4UYHM6/P3p0hn2SsgSdaPhzUxo3Aw1DUiCh3PbgJeY2H9N6RpcQXyqxFxNfVZF1

biFj5RVMh6nPeiKzMJ0XturxIFmUMVDmA6v3aL4gM3
VDsbSnBBs5i2pXvEe0jU5A5mJ0zSQzAXryJLS84qxOrdHKqBIKmAPbW98wT6x54Z7Us1h26WaAw1ECii

qbxy2ZkX4HWxOLG8PWRwtG51DUb9gx6eQnJbYsCk+IRuIGnhGA3gcN4fluqJFSMzIGaZuaOBnNyp/Pp0

iMSqbc02xKw9RI33FeHQBWpFXPDwjwMxB1iRCopmKF
Lb8c5R9U23oAA4tjuWPF8lsetXhuasZwxIXzcOjPlHlnTo/S9T3QhKguublQnd4G07NrXVzLXt9E3TGd

5JGqWh73Lc4mH5sgDSaAcejdB38xZWWalbr8EcC4EPICnAovPzUwP98gjTS5zutujZ92o0af3jc8N1rR

NQ65zXHfEDbh2/jKrxTxHHZUFNEYBekbhVgr2A1xIX
ZYk5DQ6GgkS2zEqWlgj138m6d48Pp3MPWq0QZc/s4F/G72fJLLG7FDd0w89dEt/f6ZSOpZ8atJwRRbDD

9p25jDDpO43Q+4m36bH7CdZ59EYKYpgowoy2pf4eSLldYC7cTMCjjqL1USAss+OZZ8EhVbvFkWHgSvup

NCs/KC2P1yiS/9K2sDlKLgSDjYxnkMiM5F4z2spnh2
1FYwWUcLQ+J8Fw/egpFrikAHRwVQb7lNxIFhddZrEZylUopLXgnyUtlxYGmlGnvX4iZBLS6Ahl3W4Jrn

OI/r064+7s4HXqoJ9+wmpphzLhySV8OrOACAPwEHH3jtiKkVmX0Degf7EgnR8Grz5bec2VRz4IFORg4+

IfqPE0ACfD841EGzy+N98whOBB+kNLBptg9LKUjQXI
yB5Kyz5NaWcAn+I+vJcNjIAqxZvt3JHPA6+5z+yRy+o9ZHrWrwc2fvbtuWX+/DBD7WK8LI/83ek+Ht/e

NrrWUh08YUewLy6n+5tib5W/KAMjZDtqfUzMomaKV7KE5Wpkth7u23wbI5P1PiDv/fXYnVrBx668NABn

/aU9VhD/6N6UM9nyuAP/F/GdI1ZvuxZ4Ox43iJ1fDU
3B3t9flp/syMQPMNTqq4na6isgzQOvVPJH+wSE/ElyvWX78KbbXwSwA/2vwQYD84KwlRacTjGLBN6WD5

pdE06zNJPC0eu9HAxPNBfg/dwjQLJMPs4kArpNNY5HnRLH0XN9AjSPXGMwDUS7Jvj4G963qb6jD8bpgI

oe5vzUfdIZtFvxrMpiVvTyMBklR3rkh2+ywaklkYD9
ExHxMf4/tz+88DEfej0E1ygqGLxwJ+a8/e5gItgLPV6S1vvkjx6Sp3Rp/41QhqJClAUzAL20evRmrIzS

zWZa8cyu/LToNWBP38JscGTqcQxYtAgFp7G9sSx7NVV/3/WcaRdosqHduz+9TSfQCIxV0Rm9Bzp5Qb/t

6jlv9YniuLqG9a01k2WmjaHzMiGDPBJuFbS/DpqRQt
KDi//tnl/rMTgQcsCdWDWnvb8rkv3FnWnlRVOLIIXL+9DufUQ5zxsBTupXW9AFoenhw3zRQy7Nmd4BYU

Cnh/CQCCOmfC2hmaw9C3xHhYBlnGCuyhDQxRrF7F52CWaCtPT0h1VYeninYQ++eC/RuvdBXPpjkldGO8

kh+4YvS28SLSzNLAMJXArCabu23zz5nDe26tYD/g5I
aWK1zqYt2ZkalJ5vIF6SWyAmgZXzo11V8Sr9k2S1dajcZ0uHf4XikWw5X+omM8P4D/Uo4WBxWJbBShJj

Cb3owx8Z9vYgNO723ue/1mtN6Djs/hTQi7QJM0LDoFvaSXE3hjjRlVIYV83vPERcjN6CCbo8g0TgGqEC

KV0npAM56XB5KUwbg6W6OxBaPgwGVBgDRwzElSbGQ2
6cHp08fVF6GJ4n48owp99BtLRYa7uOL+Rf2qyXZwelOs7FJXP0zbMRvRvoU/cVC3MUcVkSeWv6RAPsof

0+hRejb8sBDf774h4hvI1icAAZZlvBt2i7quEV/hZBqHc9fM5ZDnSqHPvjpy1wFIdw5qnaZXlJ9ArMbm

WmMikkE37NKcPZB9OFW1McqFfUmUYlmt+JNb9quhm7
aeZJSg+OD+W52tYoB62KNBY7b8Ls24kKJOhlHSc4RuYDdmqpBuoEtacQZeKXNsU8STCzwnUTRazI0B+V

1R2VjKPR8qKc+gRAZP49WAa+vpW1OqXTRVkUQTcdxZVyuKxfJNdNqO0vqsvJjIpd2EymgkXM+EMPbiM6

nyDdEcyCCQupemcBCi7p8ll97lURTQ74Aup4NsiN3R
b6/720SnLo/26KeMzKuggc1tXvnIRmNxC/11P+7RBccCm1zh8Aq7/V+m+Zev7Z/9Ox0+XIcsrT2H9xf6

V8pzr6NveIopwTuy7ZRtWCMS+qEtnbuZO0Oc81inGLrhalxks1byHPer5Dnmpqg0q6Paie0DC7mWzMer

3WdmI10xHtf1jEHgIBb00tzbw9J5A/App5oygBzyo7
tViBD7Ot7f3F4+vjZfD2WwsjiFW2WtBilLcb9Qsxqca7IvCp8vV4FqlnMzUYdi0nUCxlIcq1aaz86/bZ

k4r0n+z7lS5CYk+lz/we5Lr5TavLJukzbwUz0Ih/8tTF7tnlw00+HiFL+RNQMOxkMjyauSmhMDEwPfL9

F+DkQxmmWCdLzBELDJlw7UqKnxpga5RvNplbGfc+Da
5PaYyMDGUIKtAA9V3tvSaNLRYFcU7P9TRisUnjwtTi70poiN3cB6S8XO5MACsAdSLQo0/OFMe+URMGzz

B2s7/oicoVhVf5XwshicNAI1txysl6qVS7sD4aOkPyh8pzYZjfVvUw8JFP3J/9qC0zukk1Sti+GO39Vy

v0uFdQT7ipXIvsPEgDz7hiO+FYDpFEnv5ApSkD0nLw
hRps28wWZbMEvtTrOLBwFE7Ovcv7wGnhtH141YfTAbSPRT4yjy5shl4d6KUcrwmfaYL3JKVSFsA65jpf

ReBRsWvk2JeD4m8odMC4kyKDPqZuPI5EAkVCAOW10Wjl1D9gPd/+8R4HeYwXZDcPG35ena/r0Ua51wFm

6ij8WuIN7Sx8c+YhzC0S9v/GmqYRGeIa+jnWyWDJ1y
zdUkWkuNBHBFONNmONfsgu/Ee5596tSaf00fNqTUG+XZtqMmjEwIKBp2Gu6zq+A4eiQ5LJuq22ZnxwCa

tS9CWmbWS4Jxs0g6/yl9/eAS15S2U6Mf3833dbV3oMQYJCCTUiLJifS7w5TaNlxxjh5j9pgF2fjCoz8F

+4+A3fvMndBENF9aX4oRGwx+rjrtlul+c2PxUAyb5d
dJ6AOWIgkSyNfCCdQzzvhut6xfGu4Y4tPBzqN/NLfWB/qklXVOJf4f62LwoaANJd0f1P2iid/ntWPPaz

sdOZtRpUMIfz2yzF4TI9ad7hiMlWqNoorOl90oIFnytYgj2JEJjGIESJyxPyAaHvj0Z886jhSX5lReEG

Wr0kB+COJi5cpve/pRMuWOcxHST85DSLGqoIWoRfer
8TuV6NOV/7QvePMTCy8Sq7mP0Z2OLF9CPog/WzLcJcDyXXczS+nYNuX+EGvU3GW0NbVLX7+vK4c1M2rL

QAcHFTy1i34C85w+2VIyWvU5VYvIpM9KcWa7kdLDXEj0kRe1wX32xv7RU/n0WSrCLCeSaAwTcz7LRqG2

42FHqjfeVJ94dKCWcKWG+RrxXx3kV1xw5hOzdiQQoE
sm+aYzascr9DzSznStP412uY/64AEqWmGBqxVum6sJB92tT2Lz615M0Hf4cn21QwteIkee8qIFoP0/yc

2G3jP9pwOnlFwkYjqHo13UxBPDmwiQxdbzL5gsSuOx2wTCMv+apsBr/JqLPLyNs1pHvs4UFk6P/3aTca

jOyxZJF65RrFLMl3FklxAQ3HFn5ycGFZ/ThsWj+kQw
6V6RL0zRvmtufMCNyiYwP5+kDqR5QkbAg8IkIURaAxxghvVWNhlRFBwKig3qbGtuDlWIgAUPBU5Fo9TX

hntuJkE3v2SEP+j8cKHtcNQCppTTSFyLf7ygxHYXntjfMpA2LsxzejoqLjE9KKrCDsfMiD594jkJw1Qo

tIvMwP4cFKaBpkxw0YZsxT2dzI9JJUMV6RjG7Cb0N+
ZZkfeEUmRVSYkyoypuFv5nADkao5V9+hqpTD3hIReqrYR8o7fHUxp/mH+uYq9tojdh1Cm6QsZDrf5x4/

7bQ7E5NzywLQSIxy3xiJsDdrXQsge12tfzv5oavBKP9wC5TRc/fOjXJunu39f8k9tf6z7LBRUwMcqeIF

8YCTNGBcWB/y5HoeWMZDg2Pyz7mW0/7aIkEczIewHE
hdsb0O0hxF4hgv5x7/+AjV/7AviOKiyEk4ir0DDkMqs9AGnrv5a4sriaUEyLW3dAtXIlIN5cMNBb4RDD

QZe2eBcTIYphtrNINdqdj1RQT7tALF9hgArxCu8lp7ZCQ/ilFwPbeUJ6LWTktudRMgM+HyeYpD4nyA9w

IjWv2RnHM4gW5Gw8r/2r0jGhhLxY3iAG0ESqK0bS3X
bZHZSrgiguYNUqiwc4aRzR97jmqOHvDcVPIYBO0RqvIVPNpg1RmPU5NiWGiAjW5JMQTDc99r+bbZEihi

jop/GVHNH9gtZmG/P5BVYXzJjDLSevlsUFTCH7ssT0yWA0j2b+L1R17njasWBlxAB0TTSZ9MgS/ntGYs

C+wlcC2uSpc7cNcn6H550ycZE6OgdxX6qk/P0NP2ux
wD/DNXxqXbG5C0fDvIx/rFVD63pBqkh9m5P03i6Sh48DX3uZGIIRWfGJdeKN9oL8gry9j1lOKYdwQ+bW

BNEhnsdij5PWVh7k9QwjvvALAWst7BR7l0Eh/hBxV4khps4MtOKMgOtzRNRByNT6OeQQuWp7MWqutnhB

vAVsR3q7uazvyobaSHClrhD9Gc+q2K97xRgu3Xmqs3
PQ0WhGdXtWOsQYbAp0wOlBhshX9lPeIdZYL3xocEdab9cGp53c8/69NaklbbLhJ2e4a7GoCZFMNzCVWw

uYoB1Xg27Fq1LNM+9JYEdilvEEdtLxTpreT29Gs3Mri++nHNbmTRmcegjvu2M9zQBsKd7Nkvpem5SSB1

MqWcDz8Z1+cXBqvfnztqX728OixrJF+6gXyBY+ZCyW
1dd/NPSyWYuwSWR9oW/ZDU0Z0PoDJtrtU85qpHtcOid1213xs6p/fXJtLLsmmft44IaQf6CihXh3qkJR

8NbK16cRhxEyLSsSgJmg2xou4Sh5e71P+gzy5djZ29/EGJW99sgHJ3kPw8c7IBoml35U5Kxbvc87b5r4

a9sf1OmyjA1Y5oUSd8Ll3iAE0pUOguMj70YtdCqX5H
0qy1OsOxdM2Yc3dF78xfVfSNa+vwBCX8s0GGK3k69RAIhppyfAtz09OFO9lxIGUomVpxI7THtWi6y5DP

IoFoNlDyMEfTVcrHQo/ZcFgTAEboKjnQ2f/m+NIVEZR4t9hQnQAlBksicVh62aRKbGDR3Jb2CdwoKUl6

aT+iSkGZV45dNOk/k5QrETntwB/iOz4g+jcQOrkL5/
rkF74+jMKmnIbKTgp5ii9792ph+8cMzG4M7t8U0oz61Xc77CB2G3SehQp9CpWaV14EQnJhCjGfKkVX0E

fV8iJuyap+P1BaZgKICsBc+6/ei8kQLjiVMXc37ev+kYVGdAz4udls/BGCg+x7ZYCCgSd08S/1NDnsQ1

t6Sbaa7+/5puj7t0N3y5WjHBfh6dd/zzRW2EOw7oHA
UJ6+HBdhEZ/pdtGxejmKjfWyZfWy9EkVGPFNIOrjYSBuvJU9Pcm5WZ9KaEb3HuS+Ngzwmuq7DVWKOE3T

Mg68vTBd7eHQExGudy0+BDWzHMOc9PIVxUlqlRJJl40GMR+N8l4omnRlTFBi9GBFpuIn3P4lRNXJDa0O

ycdiE1uLFIM1oRagnILt/eIwmyq61PQUzK/Ei+YYrn
OFq/U4qK8/h7Z9UcuFMnBrf3f4k2/02HCAa1EnklBe6jUox+t1ZC78SmbA8heE35MmFWWPex1CKO2Zxj

pf2Q9CfuXYfon3i1u0lPcMywgnGyzIkCTj9IgakUZ6My3WZxj2YifC+bmuhkVFY+xTxZimzpPIKrMvWJ

GUxZFe/KwH6OpqfxHYVv2lio2AmKmbc6qvsYZIspsR
7vw+jUX1fom5NqYSjBsd83OYFN/TvniiANl8VBk5Exj6KoBxokUYpOa75SdbQaTQ486qW8MdpH0ZB0Sn

x0WyiwpvrJVpgsYluApORcxwCFd2nhYgUMT3QT4kq/1t14ccowNy4W6TPW9WCuJgbyc0UckaEoTyYJph

iv9adXcGORmmf/WYtP+a0QdMKF4vCPkqHMUhjLXqkS
OzE0Ji1Mi0YpX2dGOEx9EE5dtTIzQTwnzUg80c5oqeHDgxYFAnBhpIob8fueY1lKV8ByLCnW/fNEt2rO

sxqIqyiVc291B/1WTtOOq+B5jo366O7PyqhmDvFDXXDaXfPpqk8MlDgmFinHEh306MQNECzhtJvx0dWL

kxXNw6dYv3RrRN+GuRqhwwelMxQM6CavI4yYP2X+Jk
4Y18hLIjVDnN202Khzkz+f3VbKhNN7ejYSnGH718VL0l8w9RV1qfDSrY1/rtRrrP8Y5VNkI9HoUQSrLa

pREuMenUJlcecV3hZAmUmSO/+Df+tYKVpQViImbb0M1jXMaQlw5w1s28OWKZkEr2qWkgAwQwBBdfe74/

QHA0avC3ca4j++WlG0hZpnHp8354Or3EuxxGfCGr2Z
3TPSOHB10V7CyvGO53IkMZ5mntVkiPgr+xhsaqdVe7Oz0ReCrCk5oSr701t27JMoYjhT2kB/J/vrD3vq

KiOLo/GQuCxUgW1NKRUxGE2QvNS/xbXjQsbjgy0MGVhRVqeqg0yCG1TVDnMqYEhsb1n5tvIrYGkkWIJI

TWWJDR0EGxD4jv/GiZjpSvLD5yiqbrmyfgTLwn1aCQ
RLrmwSvjVkwy/7ThJVIaeUs+vlH+roC59H6SIFKM2bGlPz6S5KpoI3aWnkBibl45j0pROtaueNaaMXbC

GJbYQ8N7tuuLgImFbIhj0HmU0GXw93JjuvVhftIyQHlUpnxbEitr6ImhPi87F/RusqyuLGx8LKr4OHyi

3QystVy27ry7/B/VCetyAzcLfMNvEtOf5dlesMdbhm
uM+bFJycvYOvDRifcizdl+Bhl4TgXfMoyTIunFVXZrTWNRpdMMtxP9itFfIB9lOXy8ZhBfRdxCZYZIcn

dTO7w36mHNB50KdJ7utvsbCW9qQ4epPJBQ379Vby9raonZIntPwe2AdODrQWUoaPj5MV4HbBe2An5rfB

kHRTqyYmNZ/qe3VJuop0OpoqIygj+9liD3I810TB8G
5176XFGa3NBB3K0aLsaysFex829bETnid16jkVwUy7Zps4LlBHopUZcyWTQ/7noK05CYYv/Vm2xbSQq3

FQRkGE1bgYDc5CeiABW2pBGRL0WTuQ3r9LNs4bUw0uEBLG1oICCp0eixOtXyd+m7p4t320v/7rnLPPOX

m74mq6gi54TsvBsHyfrXCLnlUl0B4YPcRSadNr6sur
dJek26GYQo5MgKLvI30WRbh1OAisxn/lqu5AI12Hii7xwxnYZhqWoLQ1Ft6IkxPueQCGmsk1AWLAk07o

1role5KusbQUAEb/Bb/a7MS/+7orH/Dga0nLYG1u2FDjxaB10qqxYkM6Tblc3zLdLEva/52Da0l5/nuE

ccyRnW2JMaiVTfR70R+B9sEnKsZTBFP9D6km1ukEA5
YiYkkvbJYocGc6QeLG3rbWb63YQk4G34sUp/Ie5kpfNc1RbZMqgQeBFfGZHsuigS2520sx3wlpqPWORT

I7w4IP6lTxYS1+Zo/ZneYrs0x4L6+om7/H+jAMpaw6E6Apot78Xoj8CLMqFL62Be0SMik9cNPP2GrE0X

i7jqaxWIDKw/nduyH7k5agKV7x1Bxkih/A4/2i8ARs
cJ+9kooOWNTaCYwdBZaW9h+p5T0AY0WkoGqF/qnAU3W2ECEpg1S/PUvoHWC+WXIRVOIlW5w19jf/6ju5

vYsIp4rOj+O2ejHzxivWazPatCpa0YPV5+FrTj/ehOX0dZ0/D6EbdFm16UF7C1sSSJr3WCxKjbpffZut

Mywb05f5eZBUv+vgRMBsP5AEOhJmH2udoXNJV04X/T
hDbcdoODjz/G+4oFjn2/Bgoafd1qviWtmxn0oG+NKoQRR+3nfxggv7tW+VJFIE05aRPoV6RzCVK3+4oI

8xt7tzV2VhHQVrQp5h4ApKWQJ2JjQqT5UfN4KgbcwaeEzxaSzb7oC/i7qQB41BvPtryk9mKo7BGHF8C+

/gWe88BOFFnTTSvfPZakaJ7swsfZt4hZazYE7oesv2
s6Tlouy5zE9pL1LtE26+qf+R+rmVIlE/dQwt0cmIAxYFCaBeuj0YbFrEJuZ/FWEVHe8XIotvepTz8hDi

JbBFs9KpINvmW3JV9TsLZZFJWSV28gK0XKmMrHJrg0QVuddKM0wbIAzwrkp0nP5sbQvwcSB1vncfE/gs

Xrt4ezWFD07Bx5M6+tfMbk4VsP8iQQ7kVPrRBwC7Tm
x5Xwcn+uN7NthEdmdlPYFAsU+M3A9BOBnVgCB91LMGImqk+u/SOLEDaSekoo6JtW8FrCxGGpjaqJ903D

zLhBajOl3l4jjl2mv43uG/MQYHN37P9TNB5vHrJnv4oVmqd3ceh+4Q7Kf9G6FN/N/NJCqJjEm94DbRXi

4XCIoErKpa1b1M2AU/+KBaoHEaf/Fp3nDvacDTbTiZ
zGy+s5JI7VYgCc9Tm/NzQ1WW0fGRgrFOKJbHNIij3IwhUrnmDq6LLa0kqnSDrE20/mSYf4R0U1xcBsqd

4pvSecr9XrXlBLM0W/oP6IIXz9sakRyjpuibTGWnK9Lk7jo7kBxzyUGGu6ghhuo+/VUE1WZ9T4NrYbsx

RTEqtT6PhFqcqYzW/Fwhbz7h6BkopVibH8iPj5xoCl
wKetXcEvxNtsim0Plg+Qh6hAKb1PXpgIRejbtNg6uNJRjITdiui8o9F0peDHQA9xE+GnyKPoyUhX7Lle

rUG9CMS7xzo65c86l2mYaEN7UnYdDVHlfYN9XMsgnLyCj29frsp8cutm8acJ5gb5avOAN2SCbmkJNh0E

mbCwn+V7cMV5/edhuljZeH9R3kGDY5slrsCSYSRb3A
Ka1UxCsmNer3z4J+N7sboeB263wM6oZrxK78csgtlhvGBtHhe7fGZA2d4Yl74lGl6spcAwQcw4ehUxJo

uV3+dSjN0+lLrY1XpVWMwzwcK9dOlHqJJsVxXHJZCZUBrPjzGxj054X0qfHrrMmkcmYZHn3Xb6On9chZ

kyko+eFyBib03nim8EGT6fHa4brxi/SYnHMdd66I24
ES0W6iJ/gm20QABeJaFHBm/rvVJWMXkStjWmCMKTuYi18vpO1d7fHbA2fMgiLjdWIkXVR6Rs+aCSb1ZK

7/lhhAU4Ns1pBZFOdMfDEJ5Oa5ApfnTVf4Q++iX+5NHnW/0+NraF/3WroVPoH60zPSe1zh403F6oTeTV

ffKldH91gyS+9xuXPstumBHOiXj3NBaf7iaEb/ZhOg
t2n+2/R4sLtYQq3/oHYETUyVXufp6Cn36ZkmeupT/e9pIpQspGm44+pEXw+lKsKunT2Z+tOB7a4/rS4s

0PeZAegSXqFDGp0SElwqawvPHRRYKldDOIHzl2mZve3QGwWCMYq8kvb/K/m7njaQbGqnZ4aVn2S6ctFv

bvbpsgEYY2kwpIDfVnL2zgb8TKMzvF3IZaS5X1hYcV
Yy8o8zJfIIIlYzIIXk/eBF4u5/738jhm3FdIgw5wZ0C/OhZVSwsS0C5FeoIyC4Md6yyIe4ONFLBZvVv5

5W6HQe/r1dgPOy8VyWIvyTa9FHiYGIF6ERvwyPI2eW322kywcCaY0eCsnbdYbdZbds9qN3AhwImMRnKA

ceYqzy3dQJWW7xTiiID6/4FixjWGq9MDcuIfBhvZ7P
Z5/mtKP+IAWJWOz1SOgK2kbao7CFM08U/QJDHPoZxR+q6erW8FdYDfXtY6DIFZGuAiJXcPjUZZoRdUkW

LpSsMMz6JmXp1uOI/CF04FluiKSNdFefjpOCUc1lFWg56DVR0BuHWQlI7lr500HTxOpYsCZ9tE0RwlB9

1bXZLODs+wIeHGSC3WnYi92hTENhisXTZdzgg+Wkfr
1x0EmkMeTTOkl7slXYTcCBvRucUYmqxjz00C+oTqQpvwNQt2Qq8AFpt/jhKpCySSeIql0pFx8fgnOAEL

ExLr2Tb9oyFot17nWKYpNblZTXPFspmvI7j5F9+4hkO946ysc0tY3hNF2vr4JFzUFE/BD85NpfJLnqmc

w3w8kWK77qMddtv4Hr8Kx4/06Z27FXyZGA1tq5bhtH
ANNicyvuAHbjZTCNKdpH7/3CAHhOnz7FolzT0EbzhnzLE+vbVm7BVPtSIGnxh6MNSR8OdSftKxZOs8G2

XE89NbIm+dlnVylAoYUNxTREmx4jmfj6LvkF25adNoUzKsAGO7vVULoZDZso08NDSiuVeRaKc2EuzlSi

rHEpFE0xJAjd/PltPW7aTtgoIfDHDuU74RfRW46N/2
Xc5Q4QC/Cs249kOLj4SRStyhylW7SReS03STQ7O0RRf7v6+T/XADlzyVWlx0yhkznu9e5NSngBeDozmj

0TGY4Ac8eiZIpS4CZvQ4YDyHgBT9VA0FXsJABRjYLvQeCiT4wr4wD7M1iPPife3UKCMDNn6amGK+7gvi

y+mX1+inuJyNNvTL73XTQkxDUAilQNyuof0W0ZCYl2
HfA8we4nTIh2v4uOaVvZmZtUr6kUL7JU8yJS/u0zkKy0veLEHrOg+/vrBbwnJnzu52je9frcB0MrRHVq

nVH+6FozNCVFFlZ3sjvFD67wQZd0iE4HE+FHwHB5AvR40w0Yz03us3gSiQ7Nju7gi2g1jJAQ3JXJA0ps

Sxfv+FNcM+QoQGNNX0hX2oIhVJ/97B0tSDCJGHMvsT
E7xIJEH3bXrBVkvmf4WbNb99eqMygTmfirzNOuuK+uPPpg8EwZDyzmhwsLlZ2eUx7qz2Sf4emP3ompTp

aME63+sp3ql96S+bZGziF+omUDSxiTLosNfCM29lAGgPCGty/vuvNfovY6lA348XLmXhyrl9Otzvl7mP

RJnr5ZmOd1LetF3DIDiQWbmJujQLmhLAgWWFfz8kW3
dZgrFadK19+jd07OHddFg3JlsLiK+BEw/bEO1y7EluK/PD+v460Fe3sCGxp6ZMCrRXBHqQg7WhOqImpX

eHB8uFy2licc5oQWq88RcHVGoKn5TW1++y49C4xl3nOx39DjwrNwIwQM8RoU+ESwiH+VjFjFTKftlIYf

OT8G3+76WuvI0z9BKA3ECT2oUq4zPXhh4Rx+/fBkeE
ivVqHsi0u6ED8RwuRfOR2NuiZUmjZKnL5cKBXF/F+VyuoKxVbabaHXGpOlZlPtLOeVYj/0mvBWrfhQyu

m9eUatewxoGlf9L/IJ8Vr7k31f1lza/qvwdfjQs/yqaJPS3w3Ft+MwUg6rJhMYjGbfkBgNnrjfDZcVzb

WQIAAXofyn3XcMJ8rh1tJ79s/+PwRCmYPcV0IF/tde
xQW7QrbwDBZOBEN2AKwRySp7LT9iG18qWKn8JDsge+rC6lYD8ogezWZrnX5fZiVEqplXxAaGPiroYUS/

U8537kBrVfl+CZXPyCop9H8E4LOPL2sV5sTmxMCrbhgz6L4ymwxuBe3jVgVVBEqOWPWV1FupTIJ6zppe

Hq75nEUserGXaAoK3Hl+DFrBWizDZyr2z15u+LW1MS
ya1hKqhYky1j4wswoLkroKZTOro2Hd/3xLJdb9dlrbUm3Kq71VIR2ujmzjfemP46uR8/xxH5lGmwoJdn

JkTsz4khy/KQxQJi3BiP0tx+1NoD5DkSPLKTW/tym7UJH93fh/QmsVp+kmTxC/pRJntZppEettTmQKrt

KOt5NOrKNR/Kr+n0PLYl1Tkhk3PjI3c/mAWqq01kb9
3P+xPQUdOY/E8eB3WJw5YfRF8U6MKlNERzMqn+qzR1alisV+cVycQKkqu14FtgDKr8wWVw5ooF3TahvH

cUj+DuYt+EDASyfWZcPBQEdsVGXJ5NyO6QHeY6wnJC5Tw6YXqt77Rs5BQhI8hnknk4m7hEtSj243xHsn

XDukV+zthtKORw9oaBDFZlqjZRUFNbly64k6ut9eJx
e+WiB90BFzhpBEpW7c5rfH4gd7HVoUK4IyXARU9yTz35NC3i09/tfpB6lcDNhNHVwBnXqvx9Dj1NfK76

9Kmvyhtp6UH0aeok3O8VyEHeaoydxm+taMT6To8+L1p++2LLgnvcNS9CYD6cTq398u2J8d1yOlcvnIZF

TdhTF6Rtcc4Y2u6PJHkrBQXJrZb3wiNbGpEtysjUti
OJcQ91jJp7On78I6oPOilKc5gcFj7cv5+Wte/UBXJzkOdFJOLHla7qPAcIknVn0L6kR0moca90yd6Eti

+/wA8RKibtuMxEmft3Pk9IlSjnOtDlzMIcm3M/uE+/uswQa3brjZlbAOsvMIIzpJ/IIlEmSQ7S0RW/rs

6BJRjZd5ITullDGrp8k+sRr+05mAWDf3uywNSIxRp2
fYVu8ftTBaUf43WY1VFIy5mDwpl+FGsJlxTOWaORvgkM8BxDrXKzl7co2OljdxZaBUJErAEvVkpfTpVo

g5wsOSPP4Dbpo9mZB3F/1uHXjoviwSdGZjFN/UkjA/F83mMIz/vWiY6+gKBqczYCkBb6GAfSp7mid2Ml

RwINgpa9etbPz53b/t8s4oTD0cNS82W0oaC4rHtX/z
n4ClSghJtrDZT+aMP5tw/q1NaS0bWBI7CntYNsfVdIs4NaTJ9de7zatwglZNsMsb+R98FAqeH9/pcgeA

N1SjkJg5leCksBIX+O6Hv6ObDd9hmvMljF9d9nXtxkYS2djNX0mmGjIh4guznkstA+AxPCS8LCroRevi

Lsq0kn2iEbVVLn/9WrIey+EC6vRW1LRiBSBEc6qRwc
XhoXV5mKPRPkV5bMQ9iRUvfFtbe0ZTl55NsCLUuEfnY+6Ym7hHhZAky5bl/+1dAaW13H04xe7AN3/Zk4

ChhZ2G7tYg0o6CDj0X3MkjIcCk9gL1sx60q2fnRZF1Z0UQwwkdNcD8yIMPGOoGDTQqHfaMj/OX2hBXVd

NP06fnngXI1NBixYkPeXDgMPlFtGLNnUKK65QInoau
5nv+SNH2fUkA2zuIw7KluUgHXhqi9Wa42G3Dl4ARdJN1CU2jEJUaXPkH6ZmjKwo5bBKglpBgfp+A+mFO

kJ1bMu57hCNsnK8Zw31rEmmFRQFwMV+mrCc0mYh/Az4iXlsC0HVhtXA3glwhdDHxUwY9feIw2965oqef

+IWbaEAixrND2tAJvv3Sdsyl3WvXxhC5D9KSHOZ906
il2eUqJiH71EfBoYFS60G373nUapKVTyFkVrZ94nqpgWP2eDJn1o2R0i/U5c5KeDgfTvUpCf+r11f46L

+OQymxac4ZTSa69+IxorlSTh8u4ykEhj5/uDT+58QEIBR92Zm9b9r1Z5rM/iccPy8oLO1G87QpT6IrQa

921W59LWBBYl3D13C+GaxsML2/J8LokVJw2RiYIdT+
ZzbzIiyvgaGahF9qevEo/G0uHUd6JmSHyAy0u0iz6RMq0mGxMJF+s9VO/ULvh3Rsnk7TM9NY0twxVprz

WEHMSHE4qNNbIodZHu6yLXlHPx3bZ1Q7N2Ctb0jdnWzki4kLuxB5S9ez/mKcJjZEgyyLBChz9Y69dA0r

7zk1I5OtLVoA8diCl3oV6ofuSKXoKYlf3wlpF88Nvw
EjWPo1DKN9GM08l/rCTAtaYQTexrFap8Xs4MtA4kgjlA7LdPaCztTr9rmFf0Lxb9vUbLvNzLQqQA04Ll

8bYAvG/37dxRqBVF7Atk/3LhDYFb6Oj7ShN0eb1laoRC71pMbgY8kePVXy4IwANsaZGg6JNNP+dnwyd4

MahnzAat8xWOsnZAwDopq/cf29Db3C5LcmV6/M5icF
M4GcBaRntqPK7f9LRq0RWgtt4MKTydal7dztKkDbdyc01CSlvmyC/OioBAgmx8ZuvRnAKssrAQ2ndy7O

7joBHCGQSKm6lWKVfE8upvFCWLo0+wtDIz2Nx06U6wmQvslJ4WtmdUWrRS6/8wqFdD2nqIktLXtYejTk

aACpQmUDjb6ooZlscYvXnGmdIa8Qm0O6VrdIxnQecs
slTcsAVq5z/NMwMDnjZewREIZbZ51murWAN9jh2/y4c2N90plqOo7vJskad4E4KZhjWMo8xezk8LOFAG

Goz9m5lqCb2nPQcN0ldJLIKyn212JKgex4xwen2TuTKDtfmHeowBEKENXCPiV+wlkIdxW3B16+SQETwc

eUATEbRedeu7MuC24qQsGHd9vtNAED1Akd/E/UPdPi
jyt3WVE/jakbdI98l3QxQn69LNhxwMP0dIRUOouZRTDj7PL4XI5pXEmEfQdHsNMq1h8KL04VKsgbb8mu

runpC+XdeelKRh35JCwiE43f/eVNIc4ONxIVnZ1gCHLrO+bIpro2BQ1bFrM+a8Lm9DEfFmfb2YY5Ktvq

tnR/naEN9aW0ROPPAKfftB5dwoPGp5tq+Bo9zWgvi4
LnN3rxiJ7wAxtL/8Y6EF/NEcQUzkpfar/y+pN2vI00hgAbdpXigbNRUroYNQ9Llh53vY3MXpeutMXEwR

94MUugRfAxd4pgigr3rH3JYedhiUsAj8AY0m7rsrBcec2UwDhABI7zYDCLWIoIq+WAbFp3yFXWs0HJJx

wccFP890XdMei3oNV+nxL4C4wovx85TaVluhP3E1jF
IOxkuwPsQf/pSTGMPB6X0FwAxg8JyfRkyHIaIEeLsx3S01FQlodYsfwLkYDLcef3oPdOilv3Z1g5/LtR

OhyvNgdN+Wb9S6FK0l8ceK9nzQ3vDnVBtwN07HGNnwdElc7y+RTcBCB5mjSF6+fx5IOC27SDHLhqkxVQ

KEBjj8i5/ql31lRCZVajnfqcECDQ7OJljsKe33XkB3
G9noaiePcAvpf9fmcz/fNvUCH+lwKx/OPx5c+GvugHAm8cpJuprV7o9Fwu+mA7KDGrBPGm18uQylGQGu

2RtDvwM0RcFbz4+gzDtYnlzHQCL1odtE0kETrLE2SCyUQ5mJ1CyVD33g8dpfKdjqomC6/JdXGYvRUpP9

23pfnbk/uNXdU6mwkQzdrs65RWRzVipuKjK90vpCOH
EKWudXxWRj+Suv0BVwgUJhBqGrug0kilCsotgANQEUSYdPno49UIJKfLxrCSKvqRC3B0K74T2zg1SG74

nWqFZVDGoNtaojD21l98mDgBQd/yO/KXG67sa3kuBVdyqAMqOlK9e2yuM+AYfHdJgY4ZVtMJEZXUcqWU

OKAlZTMGCki8WyyYfLMzGf6RzMbmlVjNnF4U+2I3TS
G8kkwuaSPS3CPCka6g0jVDtQJtw26wu5C1hZI3vRGoEtc3gsvwOUWkb2X12C2Ae/pldPiGtw3A7pp/Wf

zoTHjUff/CowyPDcUeQGdbrbw/+vGjU6/N1yScy68xQYhbo08jlmHkYv5p8USabv0ahfGhn7+afsAhRB

vSOaY7ZiOytrPtfH+LnBxFJ/ikbvMRpvu+DlGn7FWv
tbR9QGNed6WQXLtkiNOildZx+/BBj3osTGYdKqo8LkdWGL/HpFR+vc366gN7bL41dKeQmiV88YQlTuey

WdQA8NwijdUSoy2dzFUNoGfapmkFTqI+Ny3GrVDXpqCz+RECLo0eHvrFZ8RQNI7//8fz4b6PSgO6LMUB

TiwEIA17XqhsnH7xz41UQIV88rrVAZxf1Cq38XHuch
6u6WpDBRdg0AfyvArQMF54CpFQwwKWH8/R8mDYlfu3mbaf3zzhXgTuZA+0rW2Qm4dueyWV2ZomaKGby4

FsoQW8wnGX+UOIDDKaYMCBD5mfucefo2n1/zfAeXUWvHllW4oUiA7wQJtjEcB2OpT1Rnc/ZiGwS77Gg6

2Yknd1hzti8cQY3D3l3m9ypiIYZscPsgNQ3QHLOosW
T+SAsTTfYzqFMDsDwkhdMOzSbY5MAgXLOHpaXwvzq6qwf6NglvQuuWwxupQGg/7MO/XLl9Z1FM0GEkcW

WW1mVWA2SDyZi1jjNMH7VrhfivHbQ5ihnmC4j/leCV0TRTVzV7ekOacE3vZ+riyv5xqXLk+Rl0vykTkg

xcPAOznrr4q3OK9Zr+yLgCSykq08Jm7p5M1pShzyRT
rDqNxblcmK9BPP4bNXaCVyp59AmTlMcLmuvz6ym8xEaLFfCpFXW/5zOcXxE0eOkAKogK9RdUYfZSpxZj

F2x8ZMUt/7Hf+ye/4RSutDPaOXpwRl1jbfaUJmmYMLE2EqausRCNUK+UfxOe27SeugWO4d/arStg8Bsx

wCMAKoclTaXWsGIfWQxLQQT9tYnAJ0mNH+bbCbvioW
Orho3fO4M2pfvZoqmgpgV4zaf71Fd6+sVJdt+LA/y0oPnd7EuwfoHqmWRYb1WaMnf9+DpZJ3QnN2lUz+

PjHJlg2XNwNaKUYyxQNmcGTrzCFfP170AXhN5wVfZRK+ejtPZc7WWJ+F8ybLVjB/VnOazW+zz86l8bs8

IeNgzF/rM4s37irWOQsDVDYfee9FbfQcvroSNBCYgN
WaER5E4Jenv31Kl+DFfZd/34d3K04RDm4WrotuRpW6+jPa1QQ84rYKgflpvy0Qv+xnCU4FNv2UnZSczg

Le4pZLsW3zPbyKg7rTBYveRfr/O96zN8awO653SMbSb9e9S9ohG6x+6zI1YQ0G4YOgh8NThPWmDyVGxL

+FEq1BNvsQoN4jzL+GFST2Snz1kw7G2R2LTnzlnq0c
/nAjd/ncghFxYMw2giGMBx0FGCiimUhWBag6zzL5yLvf92G39a1rUAbzw07OFctv2T8GMLh7nvt9ig0T

LqqGNlF4SSffzOgrRZexxAxYfu2uUTVjz8ZTTc3i+YuINHDkOYrm+7XEMn+10kAO5BCaF7EQEE8kPclB

MeJEMUnzpNe7dkiD7YXOzuZz1Jzc/ogyu/ZFqT8uLR
zztBdYDzXfMBFOcEj65Qo/ESlyIdfLijW+TCJLvNhgeWFHj/ocnhjhWMVm+CX+MZ5AOeE9cYPV3b52eh

EkFGmtbQxs0Da7ClwLwxvZRzw2/dVA5nw8xwvNDn0oViCL4lHpVy1sFHFvq8B6eRsQmUw1eYunoHGkoU

xVBKliQXAtNQ21KbJdqLzXEmWq9Pe5qlHvjJGc/vqq
pzT04ydJFQdBPACAdzIVok5jiEvk6fGys3cju2vy9FPWnmSEMoovzNShKMljJHAhU4UbMxjrocBet0MM

5rbJnmep7sLbn4FNPMiByHhBH/T5cMg8VcjKS029DbVx1pkxwinkQlpB1LmQ4Ft8LkApxTRDX/bc93V6

7IV9bj3PQDZdvf1Xhz7Lo+Gp1dnqT4XhAAxQU9y2Ow
IHcVn1fAIvE/04/xEvvT4SVWWuwaAVgoPZZfEtzb1gUGhUNSs18Cab6bIFBecU3mqIt8fChqK4s2iw/S

CqCQeWtgeD0peSVMfp6EiNLbXFnnChNsvb9PyKUF39C2iAUHr6tZn/4tMEYOiM4GuKub9te7t+VXFMPA

JHdcyyEZBauG/KRxOis57afG2LGqXMSWQMRM97Nu9n
QjaOonChgqn3BfrEQrs/TjtaITrxHsxn5CGvdEzh2RTqj1J0nr01CF6VHXp/rJIARyUCYz2QHnNJX0Uz

RmZv880sqRBAgTtfqdNBz3mUHh3lB1FUUiBoB8g602uQXFAIsy0ab2aipXBVx6LBLFRkOqDE86uIL+pP

Scmn1mts3aBqeTydvzLKtNCT+tFbt6/L83fmUCk7qK
9WVgsiqZ1VMx0GcKkzwAmyLosbak6cAQXQ/y6WAXo4gACQ7u28LLDJDoUJfNN5wjTF5wsnmu3/klb+WD

EuaXY43lqLl6sOVrjV6YaZ4uyEw16/OYINhEcVgN5bD2k+/VFa5snbXISnXaB/bIfFmU+gEX74RisPcT

8nz7942l59dz7gDQGhWeq/iayyAMgtbvX07sruJfSj
1fFTAegU+edward+SL5kB/gg89ASgpNSpIVvN4pfe9s0vQdPLSSdhczM5+t1wMU1Ait4LZYoyLlTLWu44nxA

itwxgQhKh6RtGnbXJ9OWjs7RAVzrP2JmP1n6KkM+0ke2RlhUAseUEaBDsX8KF2ITG8fI8VbSj6pJcW7U

Viet+pTkCUtt4bZsw5RjsaAGsVkTUhybHIPK+Ofw1SD1
EOILrwXTWkeONCz1S8KppVmPZLoFEUYTMWzYWw+54tQGpVwI3GOqEASfCyI5sori4h5H1r3VPQq/BTgj

1R1Nlr+30VUPKTa66/Bi8xLdIF9pWH25b7Vd++HB3Rt3jLHm+8cTzexSxxyxBESP3V8+n+uz5fBQhPRM

DKC3b9mBzwexhDs7aJant1gHJ43+f2owucSi7tszah
JLneD4aDU/qhAHqX54/xSUl5ivtbu9NckIf7SERyzZKCU6HEHWhWIqpr96Tw7X8GeXJe9wJyUv8Dfk3o

c9woVOGiUNh9Eun30Ly06owki7nOLPFzzv8DbJnQIyUfj/heOkSmzA1n4B54MCqrwIPtU/hOAFo8cn8L

jL0V0M5/2eLpIs9ICIHgcDftTtoj1DzrN1gH6bxmYl
sN6cq8Duu0Amsz2L7Gdl4xRMl9+NUyGg42vVCHEhvAqLp55tp7iTPM9FVk+/DylD466tROU6cGeIPnlw

n0jBgwmfdgz1caurBaNZj+nOkBz2cnuALyeigJZNzkf7nxajbwGHVHYfAjjvbw1LKn0h7d6B6jzUkAQ5

Mh1LEZt6ypVRyIqbYgui3jlTru+/3pI+rvPe1mnA53
dmZwUbtiwCHr+788eY5TBkaQQCam9n0mI1WL+ImUjE0z3uXTytb6hcaqvUgffBscN2KR5NUhksc7/Aez

6QS2ASUt5939/lIYdT6wvrzflntsZvp2GcB5F20y8MPyCDcIONWZX7ULj6gVidN8rRkJ4SJ+txTz3vmJ

pGUpS7BaO/KYh0+5/3uoimOJWYgMZU2K2iDx/JUa6M
K22ngP9ar6azeO4ZgrtA2Z3/SJL/Gx1hR+qNz3oX0D4eIh8ZgLuMYIBmORX95r08eDPg6JqjYNyLmHQy

gWKUnRXvjNWFHqAiofIIAx6lj3ISbTjg6zrimdvXgWXZCYd1K04WNg/0b4n5S/UQ3o5obH2RY/Y1bIDL

AxmaE91BY4V0nNMq/9CvbVe4K7qYk3dJpni+OF7n7P
EKXJBNBcM0RxJ44KOoLhP/Du6xnMDL7mtTSKoh1md/WfeMyj16nt5Iuwj3ZnP+WoPvpFiYOEssUoIYEG

Yy/Hf5+PLPhxNUiMTlotdJM+1kR9/S2YaZCDgEqTNWl9CRM8absFYnafa1Le0392v0WodNPdKEHTV6We

VmQ4wfH/fie0WNHPBpzHlVXlxP32cH+kcGPbdQHAmW
l6o7ouYlLFA4Lt0tgVjDXbN8egfqSjbH61/SdfnCSfXWgfqJAddYO4SJPrk/oXd2W4eoqHHXSdajstgG

q6YnokBG+FcghTc8leJ3qsxyi88K+mz5LA+MJTy+h7eVgLQXUgru+0rAf3QldeU4lT1+sfJxcn8DbIDc

/aefqNiiJQ+khyDBrXpKLE2FbuRp/j+zH/bTsOD4dl
OiB+aChQQaYp3mEXk5VVYwQf6elIwvzH5wgg1X7A5/1SEc1w4lJIdR1PCrDTzRSzb5/AvFhCpZRL+8sV

ngzGkzokPaQnP0RUagXC9ePQ5DLsy/01AzJchK5bGOPsi43tcMZ1qWxHyxzxKjXfnBN+E3qgviFZSBKV

UFPsW3IkEdI9uICiVVT103foF+v3h/a5E/UKG6BT9+
AJaq0kbJ0pWHrDHCWKDjQa7u3htdi/tO2StKgi+DK8hIVrj0hfEb7at8ancTPg771hZjfuprT2LNFsH0

tBP87+55N1ZpqRa+AOcNkl/EZzzM8TIDGWUOElIUNafRq604ns8re6kGO5Lvj/p8ZtFxfq0XhGNrVkPf

trfA5eZbGQ3cG+nFaqWcCfYgrYQJ6PusVzh632QaN3
8Flig/oXFAbZbapzNWvDqeeYU2aqfmvdthRWQdWj6behyHXyinTLWx02GrSjOViw1ecAf+DR1tsf7kYl

3LHkPi3s/meRTvybDAedkjiopO1GOCyAw8L/evahtSzFSNA73jPTJD4f3tshEsrKnSnZmELI46uG0X79

Rg6sFksqhU60/5p6VTmkS3Ip7M9dKeFwkcQWVbvMz7
/XYwvwyiD5RnzpEUWQ8CYT1nsljSDhz98FvcAcdde9NaleVaD3dKK6nh4QncqHQYVTwGDiYjeC380JwJ

pDNrzm8oHxTXRv1eujESJ6u14bGChPGIEW0xexHo3mpbqw59ctAQLJc+RU6U8yNV71533xxXLJKMSEDD

Ml+32ElJgbvqHuSvs1QHdzqx8ISBUiG7laBEwtHl1+
8WuK0AC3Y3JwDmFVr33L38a3I0gVp5LDr+qV6wHWfExxbCLL8yY276M+mKLlkSuuujlV7GjAUL5gpqES

SzCMLpyasrbyjU5N14YGw1NvWSdgatuxfLfWQTXQGHvKK0q5UUhNI0/JKXCDkfIf+Tk+jTBdxxZTVuGX

MMfKqb85jCOSxLdd8Z9dDIoxacBKKK6Jk85y+MYu3o
kFufLHbHNJXtvb9QegbdfuGy1Iu7I+/gP45odcRhKkF4qDNs73yExWL2DiCjdGsUXkJvSylfIgfueBXn

JNf7PG5MVdel7SqkI535b7oVyGm5KpHU/7xXOotklsb9Airl1LeCxU+LhULZ8LfZhNPLlZG6XY058I+x

aVLGYurcE5eCrqz1lbxeLbwiCtSrNTFyvQlDMtlent
CFFT1v8UhzzXhBWHBCpPHFbqzRNKATB1IHo2P0MyMwuA0Nu4IcnNZ6HxR4lwJBmlEwRyxWS0lvSPKfF8

K7mWE13rpEkycHwmfcDaaTyil99/8doMEef5gRcJHpOtaRm4XGn/xA96MQTz4nA54ZuRLTQu6NxmxuLF

eBVtkv8GSmcX9aQBTinAukrReNxspDSaLqfdLEE9EZ
RK/kAMedLJs4+k9XNXg6dc3tIn1+/c1RpLrjqaKbgq+ufC1LWx0n9M7VfSPjQ8N1DOSo5lcVs9iYg3AL

D0DTyMJPQtkBFhuv1eaPXIh2/nvpLYbPtdnb3YKKp9/n6oevoSJwJ/Cu7P7oFsPIB9z4sjYd97XhDHtw

aYwPGVkdMeH+J+NFsKnFdsIr0z2yCvNu12ZqzxZj7R
0woYV8ljbIcRB+dbTc9Lt/RzBnD+dK4GE4HjBUXiJ+D9/sdrRf/8qifvDzVzcgpqLx4dQKK3/azuMaPt

Mj67H0vYng90oJNUnwAIO5m4uZVvu7infhVTC79x8nk+DNa2THP+KmeWhBFbFVt1GQnG9GlwoYDzFod9

eg/EUUjoS2v+t63qclSJDuLrt1fAHyASklf9G/Veot
TUbhcdQCL4JWvm2freNw3Hik4d9VxpJJVqz9mEFUW6IGyrwHBpMOzXl4N97oFX9xk9leTr6HeUK7V+ds

F60iWmjYho5ILO6nPua8sMyNlhA7EVAl1Vl/8vEDs1dJu2l/XYY4zY7kTTG7ctuR+yfyEXWvWtb1swuA

4ZhoujoWv9SbzaGyelKkug3MsPI3kn34I+CIdx7FRc
a9oSZ9wpLfiKFlmHc8H90xEQRFd6qkf7NZLl6j3smZSW95rFVXWkaHVQOKlKta3IePoujH0R6/APn+W/

uQUNda9Awd2KPfJqWtpl8AYagn1r4U5E6IkWG8b+YPF2B24F/3x71bVlPM53EGI0twt+fN57ILakwZ+V

l7nj58iG5eE7Ovd8MJe+pPTKgl7WROOPC5NFJEBdIw
3ohFV7a9bN/Mguk5udyeOFXf5/DrncqMj66nUAUf+bqFdfwHLwpa8k/xKPGbbDyLZwITn2AxeSwB4Lzm

h7SyLUM24Jdity6pef9kLBO+ot19iUbGIG2XAbvW4rO4yUOCoy9AnIwUjQ1syxc6Wte1Fb3NmWiRdtcH

MAWSp0SjnAxEBo4FhEQ31X5bg837vN0j6lFraOcW37
WqHByGEykaNX2nJwLr4BmSIoZt7z0Kb9EGAO5uNNTnfZIW124eXxVYy/D7H50Vc1KJX8wq52I2rxfP42

RUDVlO5EhDxfycNjUyGBth/1Oie9aH/Z8PjhUvP3Q7l9EvrGRogbOaxhDC33RBb00IPLuyLjDqOAbAkQ

djpJf460gcAf0w9YYiw2/yUvAwj5wnH+aYkMkLjkGb
tnJ/HiEySZ4lVxXS7PG262icjjde/BujvzfTW6IhYonk8QNbvbOU71R+8Jkhl3e7z5+E9PiQ8rx4KAzP

WHgLYJRvQqk22JC0RB+eDQQ9AkpUZMI471bq8Hxq2fr41ASScNGWDCbCflEbei6rg7JUoOrX5XWWLkvT

5Z3MyDjGb4HcVy1REXdTF8bjCBU2/9/tng9h1sy7EZ
f/ebyu8oNH1loyA9Gndc78+/XMST5NZghxv5+pb95FC9fD8yipRQnQbR/Jf4WXlynoZrKtFNzcwJFdle

KyuSY0WyLZYw2vqYNYjngE9+nQHvuITQv3WUZ14CUoHNTDb6kAvDlPXLEwaaMhrbapCrSibwy8uKUMBT

uDDuRYj51vsVTVBxBQc6zquXoX+hM8eLO3PSz8/1C7
b9ns+uwU+PnkqMHeMf11m+6rLi/dGprd454rs0RVYM+5f75LI1D1OxpUR0BvJZeiEXxu086mz/k6fzTo

ylTvNh7EXv7BSydfsyJaEOHEnevL44h89RBznlTgQQLYyOwewoa9BSeA6YoRUQyViry6MtWjb/NgMN/N

5gOEs/++zmuTw2WxWyOM0AZZLATKah15ZaYMye3nmt
Xk1u5/caLb/akgMoSEzbQfPk9XQ24QEF/SsQD5Z6AkiwgdbJ1zhcYglLVBOWUH4fraK6cTnHsOswxVs6

Lm/PF6wr3BMNtcnct686kTOoGo/F8iHjMG0UgEWUfYN9LW0Y45iGzHoVeXWHweggFyZ9+FIIitVRyYFE

bFt/kyRyDs3bZkjl+NCybUwST/R5YIvs8a1pyt80SR
/0krWSerAn5/ZGr9J9DNWgloTY9A7Sn/GlaHsROGxKtfLl/ukNHF7JnOlYdMNMjP9c6r87LuvxdDr8V8

vOISRZ6I77XsiaTIRr1tv4z4oFM1O1qMPrykuJ1Dqb+NW3GjRUCFzNj4F7/E5njBL8RubhrPOZxiXbgu

C6ST+2XpHK0/Ui3wwWdfyxFpTluN39vM6KD+Zx3nA2
RABqiv9C7pq9hI7S/5KrVSzsjqLC3zjM4SkjzqRnNAIvFbIugtl3xnEK6Y1wYDQWdMWMvwqo08qwAynE

GLzccnB+hvnNH27UoGE+0V0wqNvWr4zrNpJ+/UjUBrOClDQdyvswwaEUDOZo22xUtn8JkW+picTc+VBh

FAfEMkCX4JT3cesj3wzWs+sQEktUuo+eSMio39ZWd2
L1wLfPRt9NnY+rNnkRjarwvs5HDX4/f56r8x3wk7pdP54UGag8Xqeqp9UdcvwlbcCrVZYgVxVsKqPJaI

TJaMbHMgG6A58hx3OVi3goFa+nufIq2O2X8k8LtP2npAdGJS253sI/XZ6jHxFHR+MI5+aS57YyU88k1v

FEA9YUVfl1GskIrdsz3iDf26YISM9taGgRHySHvs2a
5zSm4FNEnui+AgmgPDeOGHAnhSSSi3hykhC8Xlf95EQPAVF2iZHy+ftbwUmK2hLIg4keBO9y2hIhzEDz

sdVe6BX9Xc8JJIFFICX4ZXN7VwIn1Frp8+nkEiLT0QdGW03of4i2Mg/w+ktHgNK5tphh03OS9VWoybBN

qrzWbEdHOHNfQWLnRudo1JqXqbXKx7jijb01nPtysP
eBoJfTzOXpEwFeB2YR6tzs8kG1tjN7YH3Es7PZ+e4qZiTLdD6HpfCbTVPLIZpWd3Lz93GTlf3fozCeKq

bSlHUh33N9/Pr7H+xWKFyyOU6QbuQ/CejiGloS5NBcNrvt3/rGazmcDNsWFKLsYF8MAK/eznWBu5h6KU

rS/BVUc6ITVQPkRlEMtV1JkirJAEaDXKM5AUVxtOS+
YlUjGylESu4YMiD+gU3lUWD1QZwLJZLFMk1w4Is/6rlMNfOdp1HLAr660DDoHAgFDr0MdSOyFK8VRM8y

+Eal4qv08ie7ztLFXxwjkEa2J+T5AtK4AQP6JeYKfRO32pvxBf0pykoeY0INVi3adspRoXAb9fM/8eGn

yVSexnhmMoJaE5FBQg1IhU9NVDmH5bIzJ5c6P4SeWz
pM15BrdNef8it35+m3+UVBxJ9i6WuR/MZWEx87JdHEMW87ks20h/Wq7vLrBE9wf3QteG+vV/xrT4Rj3l

Dn4qmOKvSLdx+nM5E9NJCIN5hkw6kUYSoxMhZhsrrZPm9aIAO3oIh7MyXrGOsBmz8g4IGq/+Ieilq8lD

lVkakrhr22F6yNRPBpo2rWQIy7Q4knNV927X3gqiSr
Ali6MTziuEAuKgNE4vTSNUXpo7cSKB00I00/Hm+9X17Lvs6MfQTh/1KwAINGQ+CM2liP6396bUZfyhCR

pjFi/ihZUSaDQ7w4Yky7Zb7WsvGov4G/cRDXOh3u+qzh9UMeqpYbzmEObN/Qmbtbfo/o4YD5GUI7F4/3

US44MlXVeasSr9ny+q8JYL3zECiktllkPovOK5lsH4
1+PNP4iDVHrVFYAqAYI5O0xVMniOm1EiqcDTPgcyKN7vq9LGAuquK8+/Xj/bSDFznEDObTzlUyPFilWw

aVrCnk0/60/dBGASgQtiHc43jdrlFONDkN6K8a/W2gyHu7vgma3Eo9K1EGqZCU2fe2iaQSD8Gxj0a3tl

VlLWrSxit+W7nC3lB1d1seoE9amsmF5VlP7dAW0PW3
UpEozfZgKA351b0xpN10EFAeFFffV/6ltVwyX7sJGATBr1szm2cJmrWgXlhFo4BDZG56ck7vhW7XOOHD

EZNByMIc0eCDxjLNd28LqgN4qgJQyT73GxOZyem8wgWtl+U3up9f/mn5NC2gZYsnoYVYBfUPaCKkuq68

6Ofhbiae7L6VEewA8oZ86llfFaBRn1iUs+etQaNOVt
InGqztkS3haURh+ypCTa6lEB30THtQqdeZJ5RnEe+sgMUN3vOl1JPQo04P4G2RFKkGffsBMuLAb0JawS

oim0MSu6nmuYfnMnWq5xMLguZWPTZKfAU+k/ZeT+DkepROw4aBsxfSgMclHZOyj+vDuuxeyRk0f+2rdN

VtXcrpElFFEcJ6DxjZudJ4v+C8kaIxxePlownT3hWp
4JlsvdrtxdL6zFEl9Y8/j3QNdw0FKs1QpB/MxK5CddHUsIA/d3W5MfKuEV7KbuFXVDUEmydV2Oq9CpkR

SStyPKqPEKzp0IPCeWWSmvPjUeuuAOxPnLpG9hwYET/tARK8ex2Lb0slBIefclq+03FUde6BNYpaIAdP

rm/ULz7QsNdFGq61Gu5OMnJHnNZFkTNQ9eZS206HNH
Q+UkyuWDZ4C8SNBqa9cSz9EkEZNTBpYKD7b7Jvq3C6JYoAQXgnaHXtazBlCVsKmH/hiaTDegyXC9Pe+D

4/s5lm/iINNQp75o6mwC81n5xZLc1n+6zF3yyZw5kBNg7/WSHEZGbpdQvgKjE3JxvihV64o6WoHtJKUq

YN0kPL3eBulv/Hzi1t+TSYVSAQELUsryCD3oUPcdpH
4LQGmYiM6IAkSDqwwolkyS9UabvDCjTc61dzckLg4+x1HQAvuGTJm/CNdfIw4oVhbWL6WmJFy9PNyofy

Vchx/h/pjUqFdYMTJEZ/qYRtDaB6ggkIrsWTfoXnFk8Vc+y/sOJ7CwT0MA+Gl8QsF4fyAAMQksDSmxOO

uCSiAEeew0kaqKHWkNkKcIVu/miIJFObbgCbKk11CI
lvpj31kW/3nChNgE5pf1I1pjWASZDOOTuWR9Bui+vyVAFiL6BzD/vGJleouCoyejmGrA2nZysb/Tdf52

kgTBlbSb3QiF1keJwFZ/DcOSPaTlnwF0dkG7k8OWZ47bhZdEmLiB0v8MGBpeXtAyW6CIql50RfznDE8Y

OSTad7bB+dP5HPRuGpd1zcW9xu4XFa9u5xj5QFR+k/
sIesoL4zc9mZy/UFonRMwoKYblFv766cjtrQWtHZUXMT0a2OVTby5RVTvbN2Zs+RLUAzeyc8bHuvd75c

PEz4bR1a46Ae+n4VlBgRppWrgZexu5sxh3RloV94mDB4YiV2TWOnZVXlKj8F1qJe+jVki8sBuOF4c5L/

UR2I6T5xSvwj2XF3O8aNbFLjT8MENZNGEKK/thyvsG
reiLik7B9OawHWaHeLypoct0Q/84Xk7K7tDlVb89su5W8TINDFStIGELF0QYOuAI84Vw1WNRKKUTqKQz

ZN+VzeYszh8VemXpk7CD6O+FGSxk/XjlMJNiP3OG9OXyPT31tqDxdh4PLisydC9PJDQiE9BiHODTxmmy

BTE6RIKm98rpfGz2gicqKn8yoJxUfMBlUord/yUpJE
ft8VRwt/yq7j5xDe9GpMySCbPiEle5xRpAheqTrpvuND1j+gIW1+3KZn7CgQsRWqZJ/Vh4gzgdcnDgvd

2rReCJidiDUMByJpoL41bLD95mtc8Kpxt7qF1gIXe7EKDVpQYXBldMmQrH9gChEvwE4zYX/TyLoB3FX+

NAQubzORjAziXDR8a3iusPwE1ydC3x4jXGkHYuEWp7
q/EVbXtRwsfUcS0bgVpQX5k09D2sYNfN2Suia4j+yOGFO8seHE+DnA1fpZPLc2/k+8puQ8b/m3/jqGcb

qQElKlJySFojy5vXYzKUP51YODH/Itf4HfRY8aFytRTP6rYgbfDCzD5uzNbAohk1nvIxrcXJlPqXkB11

///DbEqoMJ1PJIyLwyd7DEWaXoDNlCMIhLgncQC7jd
vv9hHOSDcyNPoEgh2xEhgKYF+3qzjhCY0gEI6mCrzh4SAWp/7SgXQxC+ds6x1Kx/SAcQiDNHwrxPib/d

MJdPKzw1bYx8n/h8T/ab5Ktqho+ONd1mVqw2SGj/N/lSqvXs2mPJJ11MHvOxSnx3ev+FqXnzrG6FUFVo

KsKp5wNII03P/daS7MSz9Y1KtAdiZUeKOOni2HNQI7
8AR5KKhu3LVyLsb83bBWsK9rJ3eaGhPYZ0UQyQls8DuUjQYWl+fBfj0BQjvwpRv4GfqAVcnotElXAxxT

sXWsxctGAUTFDZWsp2plCG7A/FPVubnPxrxYLGtWLJQMJhwmUF99nF3lTiPohaCLkHxoTMTgWyxL1zxI

xz+SgNzs5JG+76R8zF9msAdOEBJctrmSIIfXuWv/[place]
FXtF1CBpTGwZ4nGV9qJiC8RIAcz3xNCjkyC23VhtyskIuCCripFSzUCppuSfC9e5pbxFljHT8voB6SH/

vPJFGn/Qukh1c2wrWxz6opuoDkA3cSmPM9/nUiZ9zCGXlQYja3cAr1/aT8Ndv9ncDcKdu++rHLr7S6C0

VwdU3rC9yuNZd7wc3fQk4Dgf6R27+oBMNZ4gWnK80S
NQeoCryr3tsEhmnhXLSZxAMfHWUXxxKALC3kwvBlJgSKYfctmQCvSp6OVcy2x9t3gtxCT6pRABZelUHU

kZyXlzE9uzysU+wmmYiH3c4uUe27EAMjm6lamfVIN7+It+MuKb37ZV42l6q02s774ubEnG8tC1UGSPGk

bUY5t8JNcoLc5XDQ+vkA4FhhzPw7iBhOSVAgZlQRNh
tY2qN8vkqv3WD2QXwot7MSlA3edPgfKRqAGq6iNL0tA+ZWNwTJwooZiClXlH63W7whNEb3ZviaoB1I4V

Wco+u2AidAE+qeG78hx121kWG5aDUIIVJZ/bcczRHlUU3msYkckTxx8EnfgAocAPr2c7w/L5vmy2TkgG

mME3Q7O+QN1SqtXPW/rK/1kqp6mX/TCZ4JSKr8Tkp4
iPIC3PWCSTg3vM9O3Bc5+jz5x1O/+3TBX2i09o66qePymnpgBRrsUv2piCr6psEoYbgDerq2nvt85yvB

wkG7U6SYnvGpCzqgxm6Tf6fmw1X3YgdwtCMyZxRzJeyGk3Sq3+atslXI1oXpZGaxrFtNejFrRAIDcwGZ

zVbZVe9tgTKWx0sCMFGs6iSEBf+DPwuH5IPISmnt/d
XY/+giJ7dosmWB6JBoyOW00ZnnO0b3r8GEYCB/NLjWUuOT8pXzrCD3J6ppTIlnD9EuHd9BzxDP8wI3iJ

3GiIzcs8I0uGR5eXbkofRFkmwV2BHPXgtErX2azm4qomISXUXa00JKIRHVk/5VJuF4cmaPusEF0Zr6rI

zJkftbQhOoX/AqdGWYuKmpRtCNtIuZJsm+TvJVEz3r
tMpQeUq3W3CGcJX5MYDKbZAEmNgZ9nQhJZGSQQrqQq+ybm/QSvfo42Oe9tPKlbqqx4f4gO1vHZDAH9iW

aOOIsIjRv5qh9C22YnGLQKzL+xlR1gQORQcmGK8RPCQaWlI7I60+QaxnEWKTgU6V5QoxuLH5cKeDN/+s

F2GjMLx5e6ngm0Fe9es5GOfr2Op8vwAHf+QRg1tzre
NZU5Hd7QFIVEi0HodbRv/ddOgaGJHULu7pgJZYnW93Jv8fQvjctuzmuG3dnPZs5h3xVS1xUaeMi80H06

ARPg1k7Q5k0kyqdtyG2RU671XxzFwgAxnVk+jy+o8yux5s+7zD9GcRhmFB07sMEzi5Q4hG/s+Z4ST/xw

bUfSeilzZ1A97HxpSYZfjBtMEDB7VEFd4YapMPb0xY
TpwNLIaUlEJKOc3YSXJFVXRBqH2+T3YJGeqPJjoK9SCJ/vfb7HsYnOUampt0Np2kmy3HxT3sZkndVVK6

kjpakbYgoG4MUBvp85X8jkz0cOuVkInlWX55MOhdxgWFGIeiMXc8kwr+p0MedTVFuE3AM0h9QSFDp19t

eQOv4ast6Fc9YblW2J8uovXhBpezUu0moWPc8B1Z/9
IVtUDmONMgb6jLnZojdKrBLde3IymMuHJtD1q2zTL63i3VePE3hjCoeyCEpO/O8fHffBYFCWWbo0M7lL

WjxE7v0EvZtu4zMcQsMD8szFyy10o/jZrNkVqG13Ns92X9XB/ZRIfhOoN1xA5fub2qREQLPXjW47zvR7

+SSvsDaHZulPNblfkbERK21Hhr4mZQuFS1x6tqfvmq
Fp2SGRnXrxi90Y8aodRIPIx3r6m1nWyirHYE07fFFbuE7icg/LuitG/RBVSAkBJ8WdbI2+JwSc269sKg

wbDKfPPF/ZmPtvk9vEIyEmlCPHtxfhtvuuOpdKPgjzGh9Aom0R0P4+Ky1jn1ZqRaakpkideLyUaYu6nv

Bmqow6b9xXVm4HAKp/ff5Xt2rDHlhMCD5jRCKx4Irl
ypQ4tHwl5zPYmPOX/iLEp42N8w+Pw2ILl7ksgROtporn0RPIMwTLV3dJ0rE/rhnrVf9bZu7TTmb88dzG

feP0zEW0P/g9uwvQHZT1g/jxUJ9ec6Cyzuf2793N8oVEWz2zKg7qbd6vXA8ablrfPhMHfUROFPKikg6o

RGxREvJ4sUAsWuUJrLVpLzKKa4R69z9qAE2Ik1TdZh
jpgniff56B+85rw95qE+knwtEzO7QgN324aglPpcpESLjZpqvsBiAu/XW2+RsNjorK/k/fBvpb/t+Ucn

0NIw6jghvKHHyqc5cHYsobtwuhlFP2mMRmAQWZAb86xeHi7I0HfAsuUNraQf5k3kj4isYNqglld2aUdx

G1a5xRXCetMRDm+q23+IPZ8XTCHBfEdnyrD8hWwf2o
JTLpF740LteUMtBluKAQcWOJ+hy7b0UuPLBgHLfnrzQksfYqJvACsNwnG4mvi48EPLqkRcoOSoNlt25Q

bk+6bTUJD+6sq5QjUyT4VQ84rwhYPvAqp0KmHvZk149HlSMpa3+P8k7ypXYxywDgWhCXQE/gWqovHnr3

hGXlh2g7MJdENH2Y75W9OxMCGl182SivnZ79Hym8BN
0k1wh+B4unW42S7wKDya//P4GvtwA5tMQ7bFL/DWMq5BVsU5hn/DO4Hoal1iQ06qE6Ke5uMjacc+7Ysr

JWIeGk+QFDK5tc2GRUge3dgLQfvkThpY4NmU/5N+rZuyZFC4fVyvGba41+mvCAyuegbUq8Pgk6B/11iq

Gzoi76K2xACEU6x8CVo2VchXXp7lyEtxfMwt0KTCWV
kHNRtcAkrICIG+/QSWKO8Pb0AoKfc4jkHBCo8SRc2bMY2lnx62rRl+h1Q0A4wda+4ESjoIia32DOrNhw

2oyNQmSH3gH8CA61+GN8Vmh/sSZeNkZMJywzkVqaGNfYcIS3SbYXprNPfjaWO5YSO8csNwFnzqC3CLd1

GbTt0/swWmgN+nowz+p73BN/d+Rb3ScO5QLsZ7+aXd
zuIEWFSoPnpbSQUpJjHKyMySHboZocRBq8Yqw+U0CTFXzLLYdltej1h/X/2SpKkD2zbm4+IKoDZdDGhm

7pSmtlTJ03CuA56uwjk0BZ6V1xDzTDYwTtUDB3Taj6XWcW1v3U/zuDaXpBpU3Nd+na1Fut7+EQlzRAuO

8OgNlJ+eS7Pa69z2Cdkq9TEZEjcWWbd/ofT+Ovn6HI
a1q5jeyt8WzSU8iYDJOvq2Tx7A8PD0lVnXFGz9J/VlJB/B21olxiYks27u5qfm5EIPa5dnmKlew5x+5x

PwnkeQ9i8YTC9xy3guot52Krt5qKDmYLapmPeMdyUqybjCdTxxO31tc23HxvvTN8oWJmsAGv0T/RSbg2

Qvl0gtMxap1lxf2E6pI7+xEe2RaZLbGEhbD/pNriaK
Q4Ks4Pw69E3zsQktcJ88VNmYmvUzr651kdFTXIefb0Zx4BtNwoXPrrOgADMNzb0lbyF6cTfy7/DDBpUe

p/3qnFOHov9A/LTFD3l48b4StFUTkRNXqEx5vkXXki1700FWD0cG1GH+1bHKjqfx7734DSLt92KyRUup

t7xl+lHG58MzhJ0aACVvyo5PfF8nlfjt5xT5kiKDOl
DwTjl5G+gZdEEFjO8cg1oX6qNwajo+nRra1Y0wOuXvkif95E4ziiPhYWqtdXBmsXtzXTjSP8OYTXSWgG

aYtn7v1GRGDSJe/60vHjzQp6xqjyiO/VglZx9bFRs2K56Pe40GUX2Y4YlWI/6uEiuR7b1nG1lszbFrv8

7RQnKmpmQPA62YZlsUOojqWX0AZ6pei/AmWEUByzX7
AZkRluTIJMzHK+il5g35vzLyiR4yVMVURDpSjOpXIhYTbCBBspq4iegijCfIua1XemPFeA3KnEOtlBBE

Cpk8OhWx7i1NtWRUE2ixdtA/so0W5OlrnNAO1oaCvIY4aBx+62AM41dWxLK7TdZ/7jSA6zmZzauWsagx

jrLhdjNIN4CXfOrtv0bQBvGwWNGGWhPE9YWw9OsvJs
g9kTQB+TfHxa2BimINo0XjtYQXFp10BJdfnI28MhAEFySKKB+PTspEHY0u2NqpXBlnt5oycnJ3AoWWBM

o6RnO9fxfVc9HeK+dCjADw2S1vs1gtPPxbZ8bAu+B2tRlNiyUOz5aBf30xZZzlHNJYKZCbY4hpmfUC9r

Ljuj2IUeF4ZGQTBbD9ncICL2SD57IBtsGtD2S58S8Q
R9UrSFs509sH3FcYv+T6YI90tF1lq73sIoqujIOOan2Qu5CDSD3OtWfaQgNX5e/dls9Y7nyhADvxiCJ+

OUi6e0YP3Do62/heLy63d7xh3p+CdZaeO/DjsmWV5WTmPdNVf4zpHKqSBT5CJfjxxrBbr5Z71ONzzmgC

BUQOEdy4lrLdCIpOcyUnk6BINHOcCJk5xvazm2uOEd
WcGubE4F76McYFCqIjroH6L6MZuJ582fd4xavBzifUawjYsN2959iB2+SNgtme/YPd+SlLbFRJdfd7Aj

Gx6qOe0HuXPEXGp+nG3KtMcvDh4PBi9EaNHQXv+n2kCTbbiAtY1FWFRn8kLdrcfUT8tGJMgzqEUoVbo0

Zn9b6c4zCaCk0RbUoHkEwsxlP320hRHuWl7gtwLpuk
13R5gdO6h3MAA0/OM+qQMqSdybgyF38RBIFvvLpzOrdppqzWYRHEbU4U/TvaDP99ebadrB70nUsD1Don

tcXSZGBxmZadZXKb/R8gjLT9kbHRWBdBIMIVueDk6dMAIY/ofrkR0gQzbekb1aMoIOeaF1KLM3z41XV6

f4G9/s5Fw36qEQ8rpy5IwhVXps66UEerHYZKlypiR3
qRCmW+ZA+Is5DUAVtSWdp5fXgY1j6ka8s+ZBS/DONtEfAxEPdEtdKVu3jWqM47cLo6FOuhcDy51M0EbL

Mi8WeMFdNtjby+671UD75CKzQDTbiuXv7+7ooBAQQe8VKdnm8lZSovwcWjfkh2wnhL/7eWjE467KLblz

fVCM158mnTSeE10MybMl5Vc/3cgKZjHLw8wkxxiEqK
AgZRuQEzhYgtk6ZgS4aC75aDmFBkeJTMyuT69p262oQi0Mt3HZtacocmOhWpE6Cjp5AWLWWHKNNi2xpH

I78Wx3YAUG6CYpsYrnRa1CiC4jIHq/TUwsgZduQyZEC59/WEbJxcltdm1qRY/btLylvjHkZstQj+MXhy

Fkm24IuJAhKh2XXpItuTOe0CF8o1Wlr8mhyHx0oolY
PXanyQgygwdTCg3dmKLTwgmO75K+BVfJ8xgvdNj3rrWVZL4HVkffK//ZPX33sJZchXBf2k+H3fy3Vr9d

UPCa71LEMf/VGQQ/Nr29KLtv5vn0DK4mp5758VoLZ9YDGCqc71Y9iOgDXxOjxO91Pyk4ZFXKG6R7Zw1S

bgcKJe5jHlgSwX737u6ai4hCJrf3qaFs1TljXxBgty
VYuJ+nAEOabF7FZNbbP00K50JPzpsMURXjRVloeRdqkN/nn+buDjRiUH7S0ekY96rFNvyWDJj/eD91y1

Dkk/KqhMNsWH4GAnw/XI1bScmA0nSUG5gTARRPJwNMosUMDXEmeyHkgXlGCZA18rHyyyCs2WjEt+QGAI

MCa13Gi5kDtt0bwCqB7aPlhpCNKGhWbV32C1iG+FsU
vqdxk35xt6krks3OsDI3541rydSoj+MEnWhMpqKO3XVVS/WEW9yr/sADydLYiA+qfBVkR9yfoNTOeNaJ

ThEerP1sdlL26t8q3xC+uEc3/Bp38p9AxwdDYsFb5iTelsimVLbPDhiUxp/JvCUjQZPb8SQAHFbi0gXJ

kPxFIoHJs55WJlO5WUTm9w9KJVD6TSVYMXr0U3BKYY
r5MrV8yUAxE3A4J8o7HU18VYnmGo+u+qTYhBNdL+3fBu7VBH17Z0INFUHLx6ScFLDwqmN2Qc9q/Go4Me

dRGmUW+xgyujoReW+1U2T7jcf3dT+Ls/H20BkRXZixusQ4fI4HL+YS3eudiM00s8gOFn76UqkcenSNcw

lV4gC9TRw0naWK1Y0ruS98/cv24Md6s9hgTA83O5yo
EJ9YXUu3PjXsZ8Y4o9FFuhkFWUcwM7TxhWCvWGY/+6t3TiPDdTP6gbsE4rfs4PNVlN2db5MhQvgmo1kW

ozrYS9Igy7V34JAEQlPHJYXQR2KnJJuwTZTs0cilXgVKxSoMpJVAASuMKniz2yCzx1izhrPudZW2xGsQ

s9Y9wSIgKgE5VSfUjlBcYje66+qkzXiBaqZHDB4a+Y
cnnx/ZBqQXRyfEXuCYPNcUCq3QkHoNYnDP+4A/73uPN1196cBLBNZFyuA4vSjlK7rSj1UKf0Vj4muQbh

BLjS6AG3y4Hp5v/Sp7AXhUbxpRidB5i4lXgoSPJZ9L6k3a+0IKPC3eYgzrFRMalpbmHpKcaf8czwWz/K

md28gTwcdCxxxZKhtiqc9fp7OQf7WQ5gBmq8xLm4l2
oDSGC3IM1u6gNrN6A0zQDeNMZMgcABcEuEvNB8+Cs7q5uwAxu9yGdE8+RCgGaTu6Iif3tifvsyLHw6ys

TiZXDcNPx9jXzEz1PVVkj2S9bKjVC1k8SfOlrbuLdZRKyqtln3ZRmlZ5yZ9IG+B+/rGVyVmUiGlBXz9d

y+F+LZchxJtT5IXc2vzwvwVcAONjp9fq+7fJPI8lqX
imFGOz0kllPeTQigYUrzjQ2+LMyzG9I1uryzf9AijWWQgWFWEzqxFnBGDb75UQKs/w+rlZstD6+5sFy6

zsvQv/irfrpny8dTaokOCPyPBrnvcazomakokVbs7ifTFuL+OaM2nWoTi+t762NWYb/+bFsin3A/aY71

ymiczzvSka9jGvlKvjr94cw2wc2QtgqTAAU7ANx0/R
oVmLcztHwsEH7/0LGk17WmlgmV0rn64jMWfkyGS5z4qEoUrrKC9ftFbY9+UMYF0W/Sgty4QhyOZYLI7c

yzCtmuHzrDdsv8ITaneOgSK5lBwNaUhTBPUM9LjAq0bKAiwyZJU3baNKrYPhRue5HnT638L3ZkFV/PTW

EEiMogII1pWXg35N/IQk27TLw9PQ+z9RQG/5gSV8v+
280SrkARlMuAbXM08zkSTB6ks14SK6bAFY8hocOyloUSsSorFSJT+89LE/cVmIKxxCx6Jm+eb0FTuV1U

4brBAt2wgSQIndr/yg34dM1VnLmZEmUYhv1H761kIKZHZiyoENSLI4e5aPrsuE7LLOA/c8+3txPBSef8

LrS8/mFuHfJqGIXG2IQIdZCjm9AkvMGn31DhOGFGw8
a7Ep0PM3etVIeWT8W4kFOI8Hk6kyeGvQJKWiwVfwHsZq7n1mfyddzNAZl7/j3kLklTNP7g9WBpIqds/b

mU/OmtcpTWMv9B6NMEbJxiLFjTupgxq8lzlzQg2Zb0thLapA7v7kKsMfI4Jj+X6CS7+2r6upiK+tsNwt

jJi06bUGiZJtxssuuXgWkXTfaWOl80bO8A+N2gbVjM
HtiqzeLQzp2kut6raW9oO2uUDs8h+cnOvu/NNwF1dUc+WG0vuxqIn+0M6CEXvapmMpg/OEbdiu9oew14

pzOzWE4p+P72guNrjYfluHtWLilMB40RKdp/T4O/YXFslPZ8rSJ+eu7Dmf025F9YhbWJt/EHmSji8vtR

wzPgQF0P3X644d875JESQT+VkA1kVniiFbjrFF/E2k
UqnNRtbthSLkMwF6yuOzDs4xncSeVXZa2D5kr+wv9IzKbWCful3faiCwcRYGMCoJdlXTG35ung2dnhTt

bAEXKKSf9ZycUz26iy2VVIVtV7rhDzm8z0pxAgafDqzsOUymYU0UR1oq7+mFxRwB5d2+eD+BjO88mcZA

yFeGyhliDY55z67fdk1p9A4i0k8GQTcj6WbKCH80Bd
4L6MIz0RjadS5ktyxl02E9KSE09UeA7B5+XquvnYu0I7+Ol9UueG6OeLS/WNKGOw7nMx1AbypKYB8YI/

2kYF1RM2UlTITo60CQzu5zQ6/q5TMYrc2AZ8xCswz8SxpYg+WeMtMrJr+WQu4FPbjdb/xGIFTi0VrNZO

Asop6A60Z8RJar5pm81LeL5DDeYW0Y3iWejD+XVoHH
sm2oGyn2QyqLtLnU8S4Ess9UqNbNN47HQjPim1pHTjpmEeoPf0PnOZ/FJuu22T0u37v48WatA7aH/m75

d8u/W/7d8u+Aj0h517zWOcektieXlHp0efNHIqdSy+n1Brv1/29XPN8N1fo2yb/ZitfLZS5ySZn4RABR

5ajvUzhcXcUdZs0ma5y3YZv3E1tz6Wp4kNH5/uiTCw
DDS6wRhSAq+5Awd7kTY/DVTH1kJJYoqMspmPVXhXjfc+nC+djsOppVYIgNeokT3vSVO1KgLoVrnhe4oY

7EeZLh61DxnbjoXIER0iT7Ieoj2UlnV/SosKTY48xnTEpqW4U+p9cMPaBeSaAUJrI3wgJ/KGTKPHTqM3

aV2vKldFciIbLq6/RZPCJb5cvliI3SAFn1Fx2paGxU
qG4QgHNz3zdyR7U9CLfUbUZathmTXt9wsp/tP9/DI/bs5iXbthIUkJks654LqjMiHdi3Q1C/5kuMLuwV

855YioiQ78kq+ok34ufgxsnvmwvE3PCbeGrnB47dXWv3zC5zUSQh1zM+oyJM5ajwSmd1aFHYqkugVL3S

R9nA2sY+Ny2zWsZsithpGMqNhoW2oDIk4yJ42+GMZS
pe8a/sPj3QUOh6b6DvoHhkUthHjRT1I2oE8RVwaLo/8W0UW+VEY8dsww9MCx3Twd2AhG4jGqPekfrxmQ

GjPUlU+2FtjOPHkcesm+3KdAsqqLBVG8gHdCjCOw23SuQ3M2esYE6OW1tDrBAocujRd/XUIkobH52lH8

+AF5W0SCTxaKuDZeGeu+AlGImFK/yt/Yy8B6myExor
SKqt9QMJXbvykryIXKt+Carlos+xsBwEc/9lRKkfhDx5n+Iopjh6H8tZvORXKLY86H9AARpX0HeQZXTmPbu

gQPXQc0qT84WDZ4P9XYFW0ONf5+Ub3/GI+pLRqXcGn4CmEFmn4D3E18Mw8qq8rQK2/KkWLWd96P+L85X

lMww3YkQ5hPYcuXQ7oRrwDfKghbmOljQ9HIxOCm3N5
dx1vujnoXP2/mfFq/7ds3HRjBlNVC9Ei9nf2vag6KkQ2P09f6+PhW5fHASHE9C27oHBshbWzYsOQ2XDO

R2xjSpMbp9p0OglfrVdTTY0rG2PfnV0inFo/mlz+N79TLuyTSbdRG6hEq5GJBokegVw93bY3NMK3TUXi

miSxs9GpG2DbfiYE9T4Laer3aClYcrMmST3vYOzAhF
jSEikOgWlwztar5qnV1J7iWx8bRSVMEHfrYYDnq6Rl6tnsJaIWNoohY8CIgYdAXWe0wIZXfaoV8s81x4

yW92TwAMe7f71fMZ9PcChR/5j3A0ocjPOgKT0z87c6xIvBcJM58nU7NNd5dSoubmyTlk0bp9/Jqw4i+d

VvA4xe29F8PH7aRq4tvG2S3rzId+6jtt9Yv9E46tXV
f4T7k93OU+vnNM9bMB7Mu3sWqAqiLVJ4lSJfgZBzwerwVqTbWDNy96y394rzQKj5Et8GZA62Suc+u1xG

uhC2+ajIDSpd9hVbT7JjhpuGVb4miE7ADzvzfNJJnOv+wD+tVrnnut8Ejey/rdgc5H0AiADGOPj8NLir

jGkKs+S3UiMcifXKWzvwlDCrRFUbJTYQVRiLwLrvjH
AVVQH2IfJLYOS7LJjg3zdnnhSocSRnwufVDhv/LkLwxAmZPiwrjzdxLGVUEh4x7bZahU17RhWhySivXw

NZRmnzYMuam/Pjc32XJC2tTdXcPUYOhRxBLZhJ2iZ040Zt2yKX3Vb5gAmxzEmV4lEr628i2YSZOZzans

TTKe82Ua6jO9X1O/OWtLsTnuDKjCOVL3weFR2u4iQs
8Cyaw33wZio2dBQqXqKAfo4JBS8lLL6nO7Fp0cuxB1/U9uJ+137H5L4vxR6g8ACDSi547Cyvf/ym1m+u

hZQWTos0KGo2N8VXcxd2mmSyNDfoVQEPqAgySXeJJ/3DOsdkhLUpbnGJZ2KxKr79nAdQsbhapTAhwgWT

bD8dFsGf4454Px+Pio+68K623KKTL4VstB/y1mMzIq
QwW9xEY05hmrj2CtFU50dhp0hgQ9HABmW1JQiNKo+6fqikS0chU2s5Kzb+ZuOZjUtsocAmxYA1WUFh49

q9QI8E6FT/Ns7WrtsLgl2RxAAf2YgPO/R/v3xVJNcUGgtwuSjJv6eFtlq3Jc08yKgf6LsqPqKsf1+Z3d

zMvN/kCSwUUV/bk2HAgjqDOPQycoSLKcuQE4V97eOZ
+Keov9bXU3LboqmhrfotYcrZnh+Tk/QrVFKaNQirE0Wswb0uwqZmtOYVUWQSb3lNq2iirlCkw5h3f9+k

xidWQ0wS7G8LGPrXHTn10xvjU18xzRRFdGh/6CtlDflOdQurWHnMhz3/2RL1VvG4A1L4HBXyXOqTttah

RQP7pnqMyibuXhmMXmB5gSyFhsoDX9m1HKbs9MZd5p
xy2iN9FshBk823cVfRux4WYdUJ7AsLaIpApTmCTxnF7xIL7B101jBkg5cYPi8XaNmspfmpyM8JRqDEJ4

JEC/XWX8JX6ziPA6zRFR2oOyjxDrzK+vj+IWGATS2+q5dvBmsFurgFyQ8Yeko5hKuleOC/8uH+7lC1oN

Byqq8DBCm3CR440UAk9CpMchKJFdi5pvE5eZ+OtYFE
j4qat4Ea1PB0S+e04Br6wVfHqKv3kZMxj6QkC3S7iUNr69cxlMtu1Z1syqr0almuOydn6IiyT1HK6PJj

10+D8y26O6ZfthIub2pEWIyB2HNiNdyvZIy+baA8WVJYMBqIpdl9AwniZavijqIDRvH8KM1dF7KcOmwW

6EX6+vgPfv7AUbjjbHn29MTRKW+3V9ax9UkH0b9SwS
ZBCsPJSHpi5pBuTWZyf9zq1jKwSeFrhwWW33ywyDFi6vm2/OljJiR+28Kr/nu32Lo20eLLuPkGn3hWxn

CMPB9ozl+a83hkWU1dLnSRk3qmsGBlEKFEsU2v6clPWkp3o26sVlUN28Oo56jeOpVn0a+dbhOK+IyZKm

f8ogga8vNbrlPyHe7cKNx6v3SDs12p80JdpTlR6e/z
C3/T1nAyCIJwcR8H0lKhV7CRlsV3fQPBRQK2vigxXSFxdx+BeyTlViQ0SDsur0GsyxMQGm9JADX5BfG7

OSuWzRzwWDHDaF9/p35B9f6QNkHYUrcRcmAgQRJRUw3htS8+8IOCMNpmGhXqeQS1TjO7UxmEg53Cw5rs

CXv6FaxqvUC0lGUvjFDWv5S4NkCzVJNkzbe9FNiPNO
YNk8GjYX1WpkKr0zMACJ1zjzJP0gCy6CR0NYY1IQqf1CVfE+VPt8IXM1km/p11Ftb0NOLZrt6hCC6hkd

RuvSBn4kowLmcLVgR+SsX0cdFF7ZA1YZy6l2wjrVLKnfjjod3m1K+jm8Uu01+5IF0w2wa5bHzFYWR2T0

ms1FQ0bIZQ6NiyYTHnCkPOY2+XvrgZNShhmgZr6RYT
4A6zB5spFRLo6rdHtzzdRmCBGTd19pKxSbboHr2jIXdCCHhtQmWfw10kNGma3wwzy8tTlar81mOi+yG+

hl2UBKr42+JkYsjfQ8kF1KjC/km7bxF0DzeDTmDC5rudFa4uyvoH5ri3eyQJZQuH2WkYWA5HGZ7cqLPp

gBrywjXe0r1RODGOW5m7rUSvFh62Umlr/xICB8bYzR
rDwJCfOXhG50I78+oo5cwJmrWNdb+iKi/LmdhrqyECIZruAxaM8GaJ3xUHPsWWTHuGtLEQdst+Lig55X

FNxgc+L0OamPm60e2DrFw48RY3PJh2HNyJc5+a/jFd3PpUuGlXHKAI56veMmKAOb8ti6YO6vMNWwAgW0

uLs2/srlkYhNuJ7IjFmBmUqURiy4Dggwa8Syi4V2U1
bm4aPwT8nU7xtvuQFD1wmCBqxp9t3Mj8zZhqnC0R5dV6ulQkSJHKw0IWAe/Z++zwVGnG+OrU1xEzYyBR

0Ai5C4g7Y0rOUcgvWqNO5KttevqdB+8y1WzCNnHnNRIWEdoRhT74DMBj8yqDwCHN0tOJKZnVyfegxWTi

teTXGG1mVDNNKyRFLrQR+yNrH6nApLemnxjhN+bJNR
nR9jH2LV5FPD+cUS8FWuHDaxqWv+EO2CJShQQz1VE34vpMdlmKbvTSbzKrqglOnDx/hPy9u+3f9/+uGz

/e3ECWzMz4ISVJM2xqT6ESKvs7d+t3nEQfY5GcNs0ESo11yWH78oi3f1Kzmh+Szm4ku4Cc8m5wSAIgeU

K+EWuW+pUrT79KEAqd3FDAKLDC/wh7npIxuVStXDso
RjKcNxVPH7AQhq2CjKzaOfNPQ4NHPEeZ86IuHWzYI8OpUG5Sh8nff5Qe7dc1VdkVvWoG1se3U7AVpOod

i8M91vJE+Uvb165jYcNQ5EseapLEH66ONC0Fvai0R9ppYz276Eq5UhXGxWv699ou7eUOLp0oI4CUlKmc

O8eMAGDr03MdxA9z7q2WhLnccoXUS5FZ6YELbi96HP
K9Pbh6X0mjmGCJ08i7VE0mCLcuXYZ0NJStIzGMe5rb+Oq5CEd/egD+Acgfg5EW07i0MND9BMruSB3Dn5

dB8c7CV0BtfhipXGpQaLbYC1f+Dfph2xrBkMMGxjFfgcanqIh6HuM9oForXxCcNXkuVo4CqKTSarOn3M

7hUOxWOfIXjt8JZ4VFu1jFEW++e8wCdY6wj75lbHHW
KbCeRPjRF60/krUFY0BPkqQ81+CIMYZGEpZF6U6QD4rX5bPCAZZNiuSJak9iMpLKFJSWHwl39nShbSW4

71gGz3ECK3aAHVpRw4+4ampIHQPPr0QM14zcqQtBg2Or3Q93tWYblb73Nlf6qycmeDdywqL08kQJNt7B

NXWOMQjb5GymvBpVyQGb+3pZ9YCQScvRdtkju4Jv/B
k4tAJ+Px38cFCNpOy8N0bq+UNEHxRbbYujB3fMa03wyKMKySF1fdqitoP8Ir3/FshshHX/Vc528Z+D4/

5B9XSh9gqx6yDgS5vZIc2Id//t1W6CLrRKapSUH0OKUKmR/czOqEP8pAfH43pXzr5ww1pDVlmS+kBsKx

uSIu9FoR5wrvo8bQ3ioQ2U1iLWPOXedqu4BX0PWo34
imOIofLP2ZWv69cDoQBGwrufV5bt+9E8jacaOga/Lx5J004phjl0z6Pyb02z75x4pNspY+l8D9gjF9h9

isKZWx5dkjjAH4SMd3bPG8iGw0Tl2tQ+7hCx5YcRhHnDODmH3Uh3SzNO9LPdHpB9h8Rwon2BsRfyRz+u

[id number]zqC9ut4YmBs7f
zR1fthWz84oHzihvFdnDCmY/oZGBaevuOZLzyQvAJuVtqXatGQMrKpeMVm+zZZ6H3ttSXFK2aNeUskn+

tY24Bdw9pGxo+CUMqWoSa8vXdJ3ec/Z3f1VKrYCpekW79fNGyJaThyQE2veunbByE64cHlWev7cK5poD

Sp70UDGo8iKNCCjK+klKDPX8KGI5HmdkYhSk3xOMrp
ml86/jYvgWfeMyTRyNQWSFfsdRXI8zlreTnT/YJ85HfG2eDHDN19YYr5j7X797JuoBtPb6PjFC9A2TTy

aWgF6Pk7ekDuQSIUOTlcSbu9Nm5WerVlT4D38IF1TYpw058umnmHqPpVO830Ip3g37LKliGlcC7EcypQ

ycI+JiZ15vtH3WujLicg/MVxNdff/x+i6ELdOOuGMm
mTixIma3WsJ7n+PGsQ2MIvAx6ymxUoEECnolYjPbMS0mGM9Q2qgMt8DrEchLzFmS1KqzEmcAxT9gm6pv

lgTNj8inYZpfemCkX06wGnccVtmgRkr2fPtj147QNkh6KGwBRM0wsqtiJPwd9nABHjavrklqlophdmU5

qbs61GNMBy/VplJ3hPppQVdfs5WtChJqr/eBbMP88m
i3j1Ge+GWAxIv1MbUzYw+J8OJZn4pnNwA2nbTUYYEk2gk3dox8GwkWf/BfYw8XEnCcZkcnn48v3hd7mZ

pWpM+QUIA/TLOJjf1SxyOcwa/9hfPZ+KEP/lQb5RDiR3pZAVbaneJnpMt4D7U8Y0FElpZu8Z36Md3gfm

ekno54baoT1yeCl/qlkgh+jz/etTe9hX69ZvbF21Ul
GbKzsZWok9Noob2BYwPCgkUFrSJSktVF2j8eABA4ffAvLQIJGfb0MEtPIlnuvzBKTDLAddPc96Ly6pJb

FhBH1j1h8Zmehun9hr5d5QsORhj9jaj5B7xLgrD7gqxp/8i1+ir1oJ2/JtrkdSiM+4gLTSqeNm9lTi5G

37d2CmICcMA4fWxb3E+fTowHbUJiyvl0+dad+s2MIk
divjRpQdkurYMX2UKNvSQRe7RFSKDBxeMLUVPXk1Z+UlEYutZehuSqTcpZ9TR4VqeIXIei+KcTvniL4u

sh6t9qV6VBwA9T5q5d6c6Uapy903lRnq1ecdnh3CXABSv5ggscAWpWYnpVE+g+LiVOOQhulWWJudLKrd

6JTuqtP1TvdozvadHXyiD75GZ05AiqxAMGEGSgZ+At
50a22cLhwULTlgXS/qs02ID1rJGNv9iuv0/f2Yod65Da723qBb5swrxmAriCbk6QkBcnvURxvETgBT/y

6vUODN8EeVXY15XAisus/k4mBwEg4vES9KKooazIdO0GbNuHZV9XqoZ0CVf7JD3tV7MXyLShJK0Z1i3U

wkeKFTVFoVCQmU45AwspoNlNGaaS0UkkLSbzeZqv9x
JfnRIIf9ME1V5pJwRLLmoIv0BCNrGCZep6faN6zC3WdEzYHza9zMT54S+SCd0Ney453gBofWWAYzq60Z

F6rWdQ/TS0/NlDxQGq+OKde1qlt3YFpefliIxNXrs0AQdIbRyiOzxvGLm0hUlDT0UeMI2Vv0V8R0IckB

ZKHO8s3SsEB95pAJQUdpOEVprBjOejwe/z9mZfBw2q
fQ1i0xnOlRnooaVDtdA2/ZnJsktY6oQsDZ1RFYeVfpD2chJkmbgd8ycEXBBPBc9rvdDn81xujVzn/lnK

76WdXmqmqvV7JnCJ5Wm6mhy14gAeNoKrOPEw4CyqZOmZbYMI3W7oAESjuRvxAB9Kh/jTP02n8lS6PMkw

CFzXGS62anpGf8eUxMIKOndtxlGqiWuh5mymMhn34n
0hbpwx5yrzlzPbPTiTOsTpsQB3qEqi4kgvnGBVq4YAG7QY4hUkaqttCdrpEUhCBa4/lQ13A60BT0274O

xwHy0UTD+9P03UU/xMBAC1EEmLykQgrpEitjLnT9/7M7Y+p2bzLExRxDc88MY8pdlBWwa2Ym+k1Jycg2

2iAz4o1isWf36c+qYYcY4T4WV6zTVULeWiyW6Tw1X9
R1HcHRUzOQ3uyiJlY8JK3KnU0S4mgLhVzHxlslavaL5pBhweigZN5NIULCjUxSbTvaLmsJfwIulvM6Rt

nboM+RdSulOhFlsVxRLWxaNWyHlDNJ/gx18IBv7WTwlvU24w1VTm711cFYuJjGqUWetT9CEpkrVs89jb

e6GfOtz+D34SLbTLWS97nfnBnlXha4UQ9Pai6MHssL
H+LX/L/3oAsZ8OJtvnObLyuOqJKFXAqf/E+zBd0hLM58Zd2MV3slNvLCh3QOMgywyhwu7OBg+uGUwVxX

33leGNkoiEQNZZ+bXt1q2TL9MwNtKvBGOK9O352i8ZbLIbggPPje6/+PYQxfCKzTW8dVLsTg6v8HGc+X

has0fEWBfTUiyQr8VWkv4NQ5iysFXQTzG+Tv9k+
8FRAxfkJcQsS3b0kJJJuVeDCEd4YvYX8Isk49vTuW7RBv3E5jkGNNKJUJf25bAPsk4Lj0WxDjq0ml1BV

IHSwHnmM5YthPQrtyE2jq8jjqjaz/ftUjz0d9EIwUGYa/D7DiZ7aN4EAOEzerQguV+bIyGM2fvYSPiIz

5Q7HKHAfBqebb9lD56AbyD8dHhecc13ApfkHv9It+c
jhzQemEqZ1bnXi4x9Uf3NPPSGAr7ywklqI4fqwUwKeDuk7aNGMJLohAtnzemyBEEPOqog6tGPel1hxRp

4Xklw5G3TXwgCZJRJS/RVbZH5aFx8ZR/Pq4bNrCIhUtZQ1FrW+ECbVNG23cdnVB0CvvD/sNJd0KS+4j+

OGH6ORBUB+DOSdC28qQymaXOvk61vzIZBQ+8NfmDHY
RstorRJxLiGVkwwCHy4437XUX6HB2LLfbyxFUPauBkhCQBHSQHSPq0ZCR1ZfYDm1NcG6EtncbQE/FRto

FH7ncLDCK8cCGWuMsgu7UKcPEcXunTtjnZe2Jtr/jpvtC8JlrWj5QTucn1qCH+gEcTOvQ7fVfjk3dfmY

2gXHHYzACxnTgX2zH6cDlUa1Wdb/BwQ9j3QTVkZV6E
FuVDWFUBuFLRSpPVnlNocPILka0b3dTo+Kby4kB6gOGXVQ7GeXl+fovhbWKvP/hwL9/4sSd5MkVzm/gn

wPe+5QuAeQk9sNY4Xb3EwnVxk5Z+fjkB8NX1iHc8IJy8bv8835OtkXKJcGAZfudlKdi3hK2MAJn0Hfhg

wZHQf0IkMLtRqXLPJLnDz7s6+voqhyZ5F5hiS/8VTO
QL1iHKAWva0JOGyDzuI7aS2O/MFF3kf+OrLsc85e4ecFcmR0WzUKtgEfvychiiB5i4lHcuPrVAnUJ7dp

CwSBO82624HvbMhI3tOJq+NTpxI53MXY3y7Vgqa/4Bu4Snx3Wr2lowyqG5Ux9Cm1eYo6j+juzKJbtI1J

hhgEMZ79M0QyJFEE2KQ9T4F0nRDZr7r6D1W/av/6/s
eVFVTk3BLI0t1C6sz8l9OorZ5568Ck0bGcAJhLHIqf/N0TC3gdePysbjqVAh0CNB5OEtxCYM9htKKTPh

OqFma6qYxWhTYiIpTQfYeytB/eEvrbd0IZjgHDwmWauFXzMFFrKNkWbgnqjk1JkAf9m8Pl+UKLEceKAi

1tgCerv6PjO9ge2EMOy8lqfsexOudXp/Nj9atImmmQ
hj+eAQiy455/EAf+hz63K3IL8ai12KjolhD34+gj73k9PmoynFDsE6taaBVbYDnk0k1UbF9ZOC0Mk2Mw

udbWG3nbRmYRDUlahf98/xzAA4wItCoibBBkm67FI819PSI7B4xZL6+c+wN1HDp3PrOht7l4+pTLgxsb

yqqH25LNJlqxKw/RMaOjVYDDAvxcsE90fyzuF7cakv
qup9DhRaBeJxjZsVsC8IQG0jzFGaKgYDId8bouCg5DYEGNJRsUyJ4OuvYKvKezvSftuAbsVuRxZs8bB0

8nNjWiO17CJ1CxJWvBwJ97P18SIHbAWd6j2w1tAIeuA/zHggQY3XSHI6rF4h5b0gS1plnmKRAkdf4ISn

PomXnX6yy7Q7hfmnOs9opaTppRPVh2fyc9eXLfGF6W
Acq/sy6DS5sPaYzFzby7Zd0cjNuT26q5fVJnJR4UzxX5MQhCaw9wKrnDUTJRsEk9SfLcbd9/Q7DJ7XlZ

Dh19nSrCwNAFjM5xQYZMLAO2Fjo7wCgKxzaeYrc7cJU1qEeylTN4s8s5f7Nv/KcBwMmvkgskQ40WkqBx

6EbpuKeY5mQfAnOU1SxiaW98CL4s5MKqlLlffH6R9X
hZhC9aqYEbSG5k6zIlVteY32APd8fVig3pxdwoIYCl+5q1Ey1C5OQsfPwyHkItVq6K5Wt174plzvDJeL

IlFDxob1yrUCDSOtWS2NiSMXTLSj242yyiT70QO6F9Rg4CrvRGEF/zpg+01iuv3eopMYcppNqk5VQJXv

eWkMeXJRHSz1qsg4V7IhlUIFS5xDK9KRnt1kidaVOu
bmX3LAMdvsD74ZY7vzyoPL/47PN5mEZUANXJoy5z0E5YKbBlgIz724PoXB9m6NT/V20G3ey5P+FUEQRM

dcqhtrfKzfjlL7BHlH2AT6InAy45ExIgsmxPbYtxHvVtr/32NLQNAmgrgn2e8gWBJjFtFeF25mXiGQZb

hdwFOkHhu6Ov6iM1QMdADXtJVdjdsKsrN1PMPEV4tQ
7TwWDDOpzCPA2at8mKh9MmtxUMKs5a89Xpveg+SP0QfE8r0fIwotJlE6vO77SEQ99tOCksmR28bEzlvd

awLZn7qgdE0MnIu/ixXGFp7RkjvpTrX9x1lGu2JJr8lvqyLqTB43uk5j/Gv41/G/82/m382/i38W/j/z

A+9pFD58Q+FymGCkZMI615I9oLyTwy+1+N9vXABDBM
VzZk9Hxgif9T7PAwRmHSzgwLt+4AWWDYzaUJMvcfist/KDC+ohyGMI0tzMBSQ7d8QCEq08IrFGedfKvw

f6LKUlUA/fVDrwmWAQ5Y3j4UKgs+mNEJj2fPsRcdszQLalpv/rNYxs/c/R2h806N6s3b5K0GQ+ma9Z8V

0qtdwSoU6YODSi3YS3kA2sf+sI5UR+oPDBvJh9Lsy5
uqa94y5NqjpxPdmOuugWWT/OUGoWQkEKwGs2LQ8xbhlKB/OiDXSvOci2TZz9tlmR1N6PkoB+wEoGZKDc

04+7RpzbzOl8Jc75/8UhNlK5ZPuPQZH3HcMmALbAYhpIFFNJAOuXkt+e92u9IecIDqbcuw7SDJwJ3r6k

CB9P+hgP+okEZI4asJab9129rZNzKwx1Q6Y31zr2/V
Gq+M50caNhjkdN1AsOyiJtrQTsaFyLuAc/mXjCQTcpUSK+CR4OpHYr6PKIcir5v0/TcL/g+o6DPyFi+I

kLkVzKj8/mdoJXzv7K9+hthWQD/Q4rYIRVh7B89R6plTPHLoic3/CEgbg7Z7dYdzZ7mKIVpoiv8Gt9x4

Yse3K/qlppmMmkqn8DX1waV6WVwVRscoqX1weHZa17
Sc0qs6kNHZkwbnEnRKZXPn1gk4LO5Jq6vsxIx12C2hKz1RXUhvK/78tYM1N7tlSFeANx+KMsKC2F76f5

zkjKjpYeFeqf2tpO5DrbkhTXmVyldlwfy0Touj4ydkF7PbRtman35sxve2oqoiiIl0SNgTdFhXnhXGlS

bcjx+VO01xaQQZ+N4wk87hItECVJR0mqSlcWIwmfj5
7lrx/rrvzBAK0En7I5GV+l/TSky/oP38D7yW8w5aujeXHmHqEcB2v2Pjxe+40Rr/3CdsVuj83OYxpEjP

bA2T0Md9bkH/R2oeh8yJa3GpcyZsBpp6WeOBpOkrvHzHH35J9bztedj/RrUlNKPUe+acqF0Ww2ElSkGB

vjJ2QtuxRU8unOJYL6glNOG4g5Wj4XG0+P4iCDq7iO
VLMHfmTacu1vERSNXYyOzyDWnr/4SkgLDXeMkdCPEXmEWf7rJf6wzA2bX8UIPcEaCjVs+D0DHz6ctF/x

yf/4jfvviEKDN9lfs3gV/dvgSLeplKtTe8jGJB+FrF4Vwmh1EVUjZae0jfW0uxOiAZPlF3MF4iYiKDDT

7+[employer] [place]adalupe/U/4Gsi6rnubQVZzGYdMC5yaC53pSZc97Qm2sR
PxUEKr2XEKOpSLo9iD1fkAtcTwQrxcXva4/Tuk4EbfrsmQqbUHxbA/rwoOLw8FUN9NvvwC1Yeqq2JMMH

JxTN/TKIZzB/nSXpfGjf7MePOEGP7Ai15/su4xE+gISjr1dyBJejPY1sgRj9y4bvH/i1dk8hlolL8TB8

MWeX5s/8hg6UtmFtNNpaGQicfip3B/iHjQIblyGRrh
PZXxKz5kGS2+8AD/5Em1eNeBjjmaq829s183V77edgm4t83q1vAVcW/qMbMzbW0ME2bQbawe+XY+J6F5

bYSUkbCXBp/8Ai3jz3gDpFWKjWr+XN3n+4cKcX4MlS4K8HaTtja7FD4hHHU4SrlE+1VUUhWvmXw7n/2o

/H/7Vl1yAZCUaRBwMTePmKqkB+R5agpt5rCVjDzK98
VEYrS71vQMbA0t/h5kcNtWuawbgQwFjDMPTEpItWkjIVKdCUvmmhrT/YW6P9wwhT1Ar8QiPaFOZdD8Bf

oddUC7ls8ZiBA+M4YpRn+DLc2WHPbtJoi800CNuVNNjOAIZvvhyf56z3H+jYVxcpD9my0jAfh4zf57Cq

wZczn1Crvauc4V7D5chg86jZxqdyxz6J35vcARdiyh
hWHYAtOAof7DzRraUM9EnH37CM5D0LR5HyF/Vdq4Iv1WIHN9PXKq/CO5/VyC9zdoh7INVFdwIyEoavBd

UaJOzsEvPooKbYhV1CvvfVgdrLH8xAf7Tk+N5We+9PxGF/WGIPYmXaN8J/HNAldBNasiuUONeTeCLZIx

9Z/VGhl/GAaJ3Uq8Rk8v34ghGKPxsb56vUHk/xMROL
WHftLxaluByj/ST+byb+D/lgzgBfh1NYkl3rmhpzv3eKar8OW286UMqae5XCcMRPGsOid/QwECCn1hwN

noH7GzA18MFqv4IrR8zSEBOrPOUc/s/g8L5dUtJy5QWhOvlC9JNEVYSwaMOKT3i65RWFMm5jYyLBsCzw

2O1/ZHl6PDlEVwhmzYj+iLqZ/erF/c1MO0m172pN0N
b1q9N153MWmuLvBcaXAFZ6AIMl44JpZKoUFWPi6S6cbxEGUYlfLua+plSWjnGCILVt2q2sYeQc9OmluN

fhXXBpa33skj0z1+bhSXDBhlNX+/OW7zPvEthSyiI3JXWTczC/rIkTikXozmYOjDL5UK7zSWdoiQIKYA

PzDyNWqMWrlXqNGGbYlyq3ZY4wxlo5W3pInPRwPhPR
YYsOnUXY/4jFvME3LYj0YPXiG32T2F+Cmlu/a/yCHSlD0nVsjMMlExgIctMldGiEe113h1R3iVsRkhlu

8KrOtyUC9phPrv3imvSNSzSxWPA8PPRMQ3mY82a5/r2EDKFOhuDVTX2RLtOIVl4Q+uDRx2lOlsSrHsZD

2QBWhpa3cN6GvnC5C9FRgeG7d3XWPVATqdhZN52EQW
MaLA6FE3fxtwMOvwGq50BhzuO6Yqj3ruNcgaX+HtStpw04gr5d4MLQyGlobGz0+QBnkz/1wXkl92AbjR

qe0gXQHVjnhNilgY0cCB1FqBvB7ZVgl0BWqCx9XxFk/AOaYK9amPH7OSnAN6tYHIAQGJxALC65TsPNHC

w8MlqVNo/b1bZHj/VYYTvjcszHdLlAwt2eD4gBYcC4
cOHfZc82nL3+mQoYkqrMsjHZl+NPGXCLahj9MAQb270y2M81jYF7/fsj6N7vjYKEhRFrk1UDHFZsS75Z

koTbTBrKBRLm3F5Mr2O6fqu52DnCmv4eqVzB6+5qPomjBqst/7D+9mKeMWolzVvikt7TjTn4lQLBfY9O

BuR/EIksHJZcRX5zzNq0NRQRI636lvI3h0p1bu+74y
rQWfdJpLR73Bksks3e7q1No+bODinSqm4Ib0ZyN6SPjpo7W66VZfWz2da1A22dVb8A6IriFECp470e3w

LQ+BP3tNcREw1GPTJGpkeW9X/0ZUgs21Gpvbu1QXMONXK3bJiqm56Y5LBOKzeBt2+sEnlaDk7ktfdai+

W/90GbRBtNKHUk0y56VVUgVhOEU7SOpD2fNp0eiSIF
P4uZKpjD8LCx3xi0oIhKBIfU5qQ90vJhUgQwTUbjS543xZBmP6DDWItT9PHWXcvVMejukAhagOEPMveP

PCs4HgJy7lbdqzN9BxodbEYZJDSspGtG/k/G4W66XCNlmp1ILff0fsmWo7DTKXKR1dLSm/e1McbX2x76

til1BCGcEfamQlzbz1fcpBE4DFUKD8+TRYZj6/4dh2
YM+e5at5UBYEYRlEUWtXFq+O9l74BBhzDLoMy1sAyIX5KN50sGIdKq5Yvwi74v8wC39KDhJ+NMxtdPWP

9JCcH/JfR4HiaECd77p2+ZDf3qgKpXSUpNHKlDbwuNCTFLcNGVKh1F4cXXO03oR4DLSj363rnodDNgog

1VvIMHvwzxX2Utok196MuMpexi/u8fhjSpTN0M7RBL
syIM0P78RbAosXeoIdt+VQ+aYep6eyX676WWru8vbGHCRHVq/kw9JBax+Z+TVH5/9R4QjhMXzglDVgoy

ci4vgvrsXeSQlB0Y1Jgzn0XGQf7HhAXuYikpcvJNK83rDID7nswx5cYrZNsoBNpqr/l04EsjZQmoXaRq

HVVqwPqy9yOMV8IkCth0PGFlkjgq++dITNWZ5jZHYc
mt2J37xxffhX5EbO48r9r1DZtURRADgj7iLvurjklm+F8Zo3oxCTRLznJbR9q/RQqp4MTkg4BudJ+nXo

+sYxgBxbTP4XyDMC/LheGo2WbbIZrOk0ORIo5SKzFt9aA2JeuEi5M73ixnPd76rA/0Q0nXGO6cSfrbCI

bJGVWdwsOreWUy5X9YTajmPFbFNH6/u8rYUtJkbJ/l
8TXJ1pt9IYDXwySv/OzHpr1E//hm88W6MDac7fbnUHNQf4sdnKkJC/t1NqWcjAmak6UX0OnEuSM3bVZf

X+XBxYY/BX3tsveyUWaQaU1Fii5E/lUCkGVRQ8hkpAApfppg/JsSpOFJ7y8hV5CChrAiHmW0TZnnIV+d

0AzN9BMJMUA7Q0v1LuhyJJJBNpb/Amz7/hfdnixTQ8
7tgNevkDf+ppIkx4UaEZbPmK4Sbp+CVM86+ED/l50ofmxXXN05VxDrZMgmRlJ1ZYYb8R1HMYK2yNJeb1

7WobhOFH3P88rolytBhrL+w3ztJDKwY2xqq5l3Qi8R49uSBqhkSmkSTPtzpU6Yd10+t1ziyyp9cYTzA5

66cgi2WHolq5KtfmFgAjazwwr2C1tFEt7qNeymRXn+
FZdIRpuUVKEdzq9rJbiiOwHT/5hLBn5pMihWo33AWNQ/2a1uKiEbDix6YmYDdrVUymRi7+WSYNFNt8Rt

17Qq7FYDGHBStEGrKNSwuyBeIkbnx/4Xrg/6y/QFTyzzad443bQ5xbBbnq8sVXoP3ti0M7kY3YyxAZ8T

8V/63PB7Yuy6hu3HLHfMMqy8h2dfjstwsb2P0u7S2V
nIICAfmBBHTFmTWD5E8d+Xer+O6O2xgk40AdJIVRNoDJX/tlsc8JyFhgS+zMIdh1NACN0bXvxG7EyLD4

r92fzBRd2sQYSbeI2C4m+zyRTCdAtU4+GGegYJpZpab2w0xNG9QsdBChGjuZTFj3jX9dYEbxysgtz/Uv

510Jf2/4Jo/6TBGWjjssW1nsY0I+CeOzi8p98g8D/E
ylYDF1EiTZXHLGwr5IAvOoWiY38AGoK2YWuD/LvI/+/Ln2uP9uNim2A4GzcdP1idn05ALMs7gtFpOfkm

lN3o8SkgD+5nZqphQcf3HimmfOy8xTD2mozq9p844hoP+9YlmtyaiisSXKIurnp/XlabAyWc4f449yTe

1U+gKAAXHSCfd4yl4Yj0OmwQx1AAAjxnr52bKFIuo2
7UveRmgSsLEvVR2nSL25LEC9gS3JNaJWI8cwR0sb0GZZTTIURXY0raIirk+CO2BkQ6rnq969dePi0tED

molrL31mlcKIrSqKLug0jAByx/kZ3q/tfYEN5deeHj89Up45ijGMI0JK/1zyRh8d8fYTWzQ2XsVLY6a8

keYhbfAqC8MmjyacII/1mVjXab+kuUR0mVcN5QBnhU
SCA545lpB4V3c4tnEkF8SVoi7I+Up5SZNVpf2A5JiiVeH/OertZXp4hx8db7AukaMPY6f+5ZbRnaomSI

jUn5WZd0Gczlm9POAG7vJ15hFgVJjg49jVf94lx0Cj9Q3VdM8hfkXMxt3PrELeD1iy82Umj4ydqoutTB

ox9RpAOJGDh/wwxZna1HHUb0OOewjLkv9OvfsKU8J2
6YUcpJb9OK2MtGIDde2zk9FUGGWRyecMGMbsbq/hlkVGRK3WDaGmlu/rH9l6lO0zN6Qibvbn0pHBi79O

NVGYwUgw+HYdPKCSky/s1i2AdEXnF/9YX0Y5I+Ulw7yDyWJ13C6DnAEipzk46+HobHa4VSxQdy15MI1r

I+o5Ru+HBdrL0kipXjO01v3YP0i/KIs1LRIb0dtoXm
qAdn0IpJzoQ/7O/RAazLX8TVrONemiUkBnRE8O3KHsS0ivME3ZnYgXDdrhCYjdyQGkn4/3y+h12Ql+o/

dpFu6ezqEIxIW/QrFLp33SCyTu2PeSaz1qO+j9zKIGVCQomtV3KbfQM0xGeF6K8r4Zq9aWUYNtH2HsJl

O9Tf0F90r2+TfiMm4jvSyq75qqPPQpgYctajk4rhdM
2jeugeHMiQF+WEAVj/F+T8DaH4IWgVmfYLU96LMpmtC5dMJQ4Lrt/fARVLxMnlVPsm3kX0J8Ouf5doMA

TrUk4ZMku2CpKZakOEvbrDF+i844kJoS6PAH5HO8b++7P94wONJRxIWbuJA26Um1sUXTnG3T9UiG2drr

OV3clf524dvrMKY8+Zgc60j6AxCtRcCkWB8WjZG+uU
Uhx49WgTtVflpa+2wK25gHIRoYKSOxCRfQhH9ZOjgmxVAsagaTR6Hjg4eqLeVu0vd0+Ar0b/7Lw1J0T0

RgeS2Nz/G7ZGx+V7f1sHfgt9RSE70k36p1wxMoWsTX4ePrhPB9LPeLY4sb9pmsuZqD07QYJBd/ECvodH

R1jsUEQ8p6Da8AWeML/EAJ7HXGy+ZlMWTXmm/ebBtv
NjEXYMhQ5WpcC4EFIQmFFSx0yB+EVFt9Ky+8GYTLfzB3lF08KKVkv3ZPhWqxUh+6CIe2/lBoNFOtDcas

4u/Pq/nhfMRFRXk4zeEYbViBoFrrBB9D4pEgIR6f9otO0bABnQEkBXiHo3A5S0B2qA2OpOgMBr/0/yJs

+64DV4y1v+6Wguio1r22z4qa3u729QEdWvBHLPCbuc
XOeVzRkmQGTJX6LA34lh7bqUT2EH4i53L1HlT8p8gusSKocRDKDdmmc/GS1ejw2jebyTjN1IlK7jjeQV

pSI/JYYgbRTS1x6HGT1DBAL1rpgr4+jDkljMmqHPXtf2qky/REQ3ZZF9U9bqRhRBgBRMnH5AEAdAuxfq

Q6tGaq2GFQs3Iae+yTa81sKM+yRmEF8C1I2HbJQjF8
Q4Dmo4w6BEdpfZeU+1S27N6fVBgLZna4IdUgKbA9O5/uot1cbTmFTy/F1z53S2pR8rKyzj9/5JyFF+X7

u1KdfpwFJiRRXyuGba5dTC/opl59Qoi6vjdT4ONtCQxf5kKssFWPozku8uaGHSE0d+VbCvlfb23FS438

ep5x5guDDjeje4gbiVCwzV23H6eB76idxv7nCzxjs+
xM3T6piuXNbj6NDLyYsINk1OlG1otXse68dV4/HKVmvffB25lwmkv3O3TM+J0yoKdudO+RNhpffnKMHu

drr7P1bnXGO8g3FjFN0P0K3AxCfvKXhY71joD2j8dGfQk5kibI8AMVC/eDif13zjm90kJuuIvnwrNdNv

XfZ7N7/wx6k4oGp7GC5KA5ekSUx+xZaD+8q7T32dQN
UiIOZIvV7lN7Lp+dYRgJtBVzxs5Qh4XMuKk4sEOTFaRcUuEM6ORjo3NQhNzMfqS72MbWt7ybm78esLqM

m+ziYWLcPDVOx4aVQTjO013Rah12tzd2UQjY/jNNcGrrraor+9O89OYaIWpIKPXI75MAHtWFhR4y2Kmv

agORBiC7TtKDuK4lCcGOqZ0XIGn+P14ouVjiECUS+l
LQBzAl2ujFaPjsf6c78iNcyg+gs4tS/xbgDCGLHw6gO7MPtl5My/v/pxElk4bMPSDYjHtTRLibK7GFwl

HmkC0vdTylqPYraKoQc3GBUaUWgbFDfW7oCWyFujf4SPycAmvt2lrxyrCyTliUuUZqGaeQWeh1xHVLli

Q8Iky8vnC8SYu90neFzIRWwfvMWX96gS50pMyRb4yE
JIBEAHIum5CeIXaBFX5rMBXHHvPi+t9hoEKygPFxQzjTPhqwy0h1ZXDEgrBDiRhsE1TXziGL30bW2R0e

E1eUvEn4UxdonEBkUXJZKBB7SQk2GSpexn7ixhPwpr2vtQV1u1synfHZzNZa2C5WUR4PKzrkPPXpuKQm

T3W08Q1o8nlKIcaawzuQhzSjDSYm3cpe+zL0cOyyVN
FbUdOCr2v1lAah6/XzuE9LiLDSOwGn1zap7dtKLiCwLzT57f112tbhyKHastStj+dEXsTZ4GgATOwNOc

13k9aAn8E566waCkTnqV57zRKjwjDidYwP71D6SKqrskgAUR9g4l82Pa5mi1GsJGYGo2tR6iT7d3dESO

PrNc2pPAhJ7tGpqrWzvXQjE+EwNpFnvb/TLtuwcVWn
3Kxgko6b1BJvTqgF1pZmMt0YwLhXdwLnYnu26NLH+oZUDbCFamN5Ut76kjJ5VuRN4sJGGc+6zrsJhDdK

PW2IkITgQZi4MZUnijSLNAH0HLmQJS8BFevC3qEIvd6rAvHS2LIx+TD+iID/mZKTr8ZIeL0bRnBVB4J0

j8hX+RAmF0MaESf1JeeqIJQRlGRXR03OMGcPkD+PQ0
MXvypql+g2cig2gQrFoCfc1ymBfQWV6vruEgb09NOezrFgHn9OJivrds2efG/YvhRI7TxDBpYIoBoGNT

rwpQ6NpeFxw/agkS7WuCPm9IFQyvoTxcVwvak7DlivWYOKUxRjjSZ0L82/DKRNQl2w1Q6fByrIWtPKDA

6C+V1BOo1bmhrB7Pq8Ffurj/eImi5uhfCwgyfnFJq5
um2Kcj6RtIDf2h/0biFwlDK98cwH2wvRKLEKYlFOiuELOBQ12aQxb+Rk90vW1RvM9iWabCVnFKyt26Et

4ye24ee+umc5LIeI2HQv8Cj2S0CNFUPh3IfW46LTUYq3pPVlX3TO52HoZPG9ZvjPkObmfsIIXlYtdPYv

tfgBs0MMPwel3622iGBBMMTRpu33z09gsIhDrJElAA
XOwwERTld1mYS0Cq8bU2rNX9Efnd5vfybECFOoTChhUoe55Qe4GHzYVJzyL2sIWbNkMynxu/acUxBnvw

U30MePrBaZNHgEpht/YagJxcC0bPLLWeluCY0Jm8bYXaN4nG8jrAjGQNh1SFMnCILAQ0vj9myQfmhzmN

vvrR5eiYvDK2oK7HbLxpqslOQHrrCKKomA7cL5T0IM
II6sJpdoYg70K51qlrFeioD5G5N0eQfMMbtImxhtDWtXq5iesI+kqrPvHX/u7z01ai4bLe+/lx4Sb2xM

jWSpAAbyUfhgOzlPavYsq0kFkoO2zRHePQmAWme4NkEaSWu0X/mIK8LSgG6Gzxs3saO8Y5YttT3yR9o2

U8i9NY9aX1JSevOlgIyL/Oy6qC2+4/ACacJG3shbQv
2AoX6GTsdYVUBjym/oY4xvSPBE164oS7YI2sHCMkTO6dKOi8XARIG8ffdhegxpKirAS/x0BObGU2matC

T6S+prskKAyzYNym7B5PjK/v/w4TBKm0LjGf3UxVS+zuvGEO2f6WNPu6A2YnsYmuPSsdFDZNibY5kRxu

c1+P/ezbZBlMoGbT8irTMBNDmnhPafNXb/xkei1gXu
WWT85ic+k+7+TmC4590v/7qJmHwF+nSEUBmlNTHkRw148qnx29cVY4YgAnsq4kSyne/LfViPdUd9uOu4

wTpLgQ+/3N23cindd4HqF9wywPnGVA+UTRCEnh/TRNKAV8snX0NEx2MrLn+2Mv9LsOChaNWoKIiqBoY6

x67MTdlcQZNIdFrVsDvoj+gBfndly3wyZjfIh96jUt
2d9sb5SivkvqXmgWfztTH9tMxuLXjmx0PXjuuW/e3J7JpeKmoi48rdXEwZRETLdhqklrSMkQIfkIIMbq

MkbGF4euIABD8nbhsiAqBc+WSnLo3UOyJSAKTJSzEquAo1YobMG213FsV4hsI91uxoifLVW9QRABmonW

baD+M4F6ExfrRowj14rvt/EHQZ1H1HVwxLpa9VJY+X
8otM/xOh3qXugsAFanOuWgjYb/KQX9UeAraOKkGW+jj/iKf7VchIp6YeAlpXR9AX/J/nQKUD1ytihd3W

bg+/pG/BRddBjIimWpBFz8BjpJ413x46fLkNsPpOI/5UymUy/RrclOWK6tp2nPty8fiOyOQ7Hhqx17cE

p23cm86aXpn7tOkA/AIJ5ZgA8doqmsVR8YnT+VvylC
tq4SJwGbtq5mS9Eia63usYVZJ6tU5569Wl6tMfF92WhbmutvfT9/UTnSP/je+cygUZupAvv827ez6Fkk

GhDk9cGUsRKqUf8Sf3gaIysZDQ5qxmL4NWgKsRCGti+QaJtHbnNtTlyUkQGo6w4AnnOwXzCKkvDoeYuH

SHE0vYhtJbx38OM71IJptNRF5M/AaXIW4ldUm9zCn1
pp0cjX5vD07gyAEYSGMcff+4lHYtdGlFsx56X9nYMsRXvXhQ3q9QbevbWUS0FasWIw58w92bJluAuCMx

f/F+N574PKfRIvTMxHSE6GptYQUXMhBlr9EkOs8qfy2hl1+ScxtS7Aonbr1lAIyPQpZV89fXK3uJUvu0

de7nEquMOmZooslVbftvAucOK5Iyysgyta+Qoj6hxZ
VyggoFKmNx6KtayyOFDsgH5hdEprjD3jiNhVfm2TJg47UyJLNgQ9F4W3zJ4V2lohRk7PP15oda2ijxBm

4ufKjVwpdJkZkmBLmy265QTV+2m3jls1UxD4PKYNKDBA8JprMl8CaACgB3XNMlHXaUzR8xMIV9eI0YXG

BCept/hEKZn1aVJ/+MtF3WKOW0vP9bk8BOdJqN/ESn
cuk4YkusOGI7XYtdH5WqCMBxdqWYk2NeHFnoWt3HfLXOOy79INNISz2+IPBgwxZahcEAmYPgOS/vr8i2

oFTVq1mk47E0YP3rtYi6+Q4kch5BjEsJNjlNwzxTxmtmHwHN4Qkk+t1B98OaryChMeSYUuYTz/B+OnKc

hgq4Mw5gyMeEtWfCK6BubhReK8D6j7U10DOfUgH2Y/
l9FONEn8YKYgu2Xyp0zNCOs4Gce0IcYKOKqQiaLrOdZHxqjN/778+obp9fyzEEou1ODwilnWhrqnXNg/

xhA3fBEy/2s4Yj4r9nyuvq4alo8JhfwA8PEDD/CHmdT9PtyjEpcZs5PoFajCoaMVP2Sxt1/1PUs92RxN

vZMv0dm4fS3lChY0qIeobNnHM6X1rw5wvG39wBLTEi
+X65nnJF9GTA7p2JQTVZotoW/poh8FMdJoUjEcKbhi8oITDz8JYdor3pXQowPIn0M+A4AVo+VxeIR57e

7034pLNrSf8wFId/yow1nyE5DNoMEK+NPbLjk87KuhRMyGpE42w7st6eQXCd4ICOg+xlhk6CXZQovQu+

+Y/nzWl4W0lceYPejSjhjv49uoqnUmmlua4Q5VAlYz
7phPHcEi2R5NIHyTjD2h8T2SqIqj1qbUbNPq2X3I/VjQnppDm0zHnzGcfeY5WA/alkNVNCHQEJ6ua253

+EM0Emz+IGr1RanKDV4YvncIBNwSFGlyV9hgeYwEe5NAsq4xpISul9fikPUyjW5Gy0wK2D8pWlV9214a

6S4mdi1LMALb0QHCbmSDwwms+9dvBwSy434WmTByU2
GuBl7G+DqkzMNoIKpo6UjdnmHAD5uYi5X8B+/dy9iHYOQErXzS0c6deASFGOYCkqyeW/ipCmZS3soJGz

2R6jCIVQaGyUedDu/gRT5xj3Wlm7gCsFrSADiQwTBrjzeJ8mcKhzD3tJRXPkN6iKOsg/mTVakItb67xe

OsqvUu/7WnKmKjDN1DCefy/krgOePaM2SSZylCo8tf
VyC4S1Th2y8F0nyrguLyQEH7KbtO+OEm6yxt1yToJt8WxjjNBbYTHt+nBCzpRGlaUHl+MBvRxzmLhapr

iMw0gwP+zck2lXmN1wmwVn7ynfL9mTbPDYLVf06aAY+kFYkRbg12jd75Ko/9I0rn/rJN+RVCuqx6CCP9

92weZ4+gWfRmkqM+FLtc1ydZnNQj1xHKMvYkE1c24o
cKW95wjtG4f/lyqLka4GOCMSq1zjX7NIxwgXVO66q/KK4t5mLnq7Hh6JOSR4qvWSEfyte/bIknAo5igC

nvrogRP7w0HHCQi9PMOvsafPHL93hgH05li8h2LOUAtHijbh35gMhPobj8sJIbrDTpG+7hYMhc5V0AE9

BFxdvEGectS5iKFn0sOUUkNqYqzYTkIz8p2dytoCJj
RctPbzrxtPhl99ZazJwCH5kdFzkcBtUKoAbZGLSayvW51z9NJuHC6KGceg98u4tgwYHjJsxx//J/J4/y

eVKPyfle5Vz11CJLzWpS2W1DyzKKYk3b5RKt7M4OoVxU3LCQbd0gWUSmJgBActwPupq7XH6FbVn74KJW

8PyDmIZclOQAeVeYPN4vVj6RXPZJFG9FtjfW3QjYz1
2j+elyyiIliuxPHNOYqUy9Yd788InOo4am2gD6C6szp045XvWc3mJSP98ro0bXBchrKdCLmDmU8EzZFl

mPEhFztUT9OF8O19d94pzBs81yrLguezZ/ivhTgCjP2lxEPcgH9uJbE+aAC1jp7Jz9/w844HIjgscCAs

wCG1OmIXB8WN/tdOBNNr+Ri2MaKb+VSqfeEiRZ4NVP
7dYwjxznLnxrxj2eJHP/c63+rmn68u29cDTepTx0niwbLBjHE/SadSi411wL6eqBQ4fxp7QrB0HtkcBD

DM2sfx9+TytmeonqesWCoRDKMxAZCE8u61L5Nf9VPY9CxQZB9EELK37audjLjsrB+MnRycSt++kq40Z8

2VQo8EOt2vTlmOEcJz8xRxiywltQJPd60MMmHYRERt
N5jsFqmGG+/F5r9o3md5t5sDXtiX8U2TLtOMFZoWfhBvaIyYvUndZX5UiLxJf+RX3ndserGOUux2p8PK

w+0aXkfctd6ayLr3V6J+qsX2JgjUTFSlhSy5a58cyq3Ew39QXLzVKf8Ad/olqAvShDLVj8bWJ1ASAHBo

jVcnJqwd4ySkonsxE/lDiCfFEk/6YO25wWtKQEzjWL
nAfz/d53oSj/OjKu+imWuJsiWBo/jKost0d+nk4QWL/bi6rJ03trCbfAAYw80tUFZ2spmSNeWBm+qXgT

+sqcq6Qo+dGeEBPuqBs+rqgIplQvLYruRZYo5XrxrGM8+g9W0KuxdjLQ7Gnh3jNhNB+yc5mPaGL7xW9u

30R/OYi9aXA1K/Sx9RL/k6vsbOAUePi5t5Fvw6qtvv
L7KD9oGgetSNECk3AoCS9yPz9ZapJ/yrb74Z7Xbh4OaLQ4r4cqZxHbjolEuncKeTFCYs/KIyvBEkFLj6

AK6Caxr93iP1pschwQvaSS58SG1WWfAvTR6SFvNktVlvF00Q0OQKiBwNa1qIkHF2tYBCayi68XaEL/SJ

ACvv3v79mfIv4k/uPCvTv1m3OZEUHsGholdLta3kLx
sqAbqQ9r3jLAi8QHQ1oGiCyzItBuqNrNWQZGhLaFZlK1Y9QUbSF8ln466IyZdhBV8KcxiKKIWbufa/qy

QX5wVrdg2GP+U3WGuEaidYtZB39HUPfGb5GjIOiRQxDk+FvO8tQAP/bEsPbeH7gl7sruA/tWryQg5Kal

uy0sDebmfy+t/oKMrJxY3aHCYVmlssQqkONqH8N9/V
aPjRjblg7Zw7WB4KQZ0q2/sQft5Av6NEng5U60aa8wOGvIM1c1UxvjLLs+R6eb3usyUhdwHVmHk5okxQ

rDuBk1FbnyeejqtLboIcUaTZqlh9L8bsvKa4/4kUtAnCX0X1c9tOIa7xGIOs/CMSuLxn4R6j6dnLGEdW

+cbPHHB82H8zMXtiram5nd3+G88G9TKnWOv99sWX8q
6O5nHUQQsZ1xfdVt0Z01L8URD2mH/6oMDlE/A/pkpS/0/7g3GIZJdOwRCb9ZHqaQjLh0CC3P7S+wCPZt

j7HZMskoTsHQcbt/rFTg+qyvioKz3ZQVz2mVuEe6jzAxruQpRyL3YWXhhtmY5C/pmBe4Jq7Lia6ADhwZ

x85IXYaEcCUp9A3lnGmddS38TSUxkzgz6MFTzrZpOo
9K/1eArmRngkp2OCsQm8v9idsUrRaSacYexuid/Fkqj/iGkWFWZh7245LXqaV3xKFL19g7YN7g50MdgA

+Ab84f2+vF+aGQ8e06V2FaiITAHMnRejCjkqjad/1UTQCOsoTKQ58Xc241NH5IND0+AxDrC92/bfVNIg

QC8g1eHY13dBTt78hF91M6PF440kAEFrNQgWm944gG
iUoUgfWqPKwtNlf4RYAZimcDLPzJfmMv1cc0l+fOYfU3zV/RGPi7veHClJaoVBL0TML02/c7vsYG0kEi

17QQOCWFzlwfhqz3qUYwOVClzaIuDVQe78K+3Xt7lLA6rXck98u1k4/JpH9lv0ZJbahWvHi7vRUf/Ngg

4mkrQ4lYE91b2SM2XrVAZ4oFHCh5D2uzqNn69Mxhoh
csA98948X22LxIV5i0ob1yWvG9sdv6xbdq8YZRced1Z+FAg63W285jlvZCQoTseNZWo5kBp/LSM2rPO7

uaFyrG+/2yWpDZ3RbFqlj0J8F62HP4Jg0PvOkiXqBA53vXPcTH5wIbTFTb+sZlLdu6KK+QnEA50w1ldA

6USNmhyE7EJQ/oAbN8nqSG3ZpQZR6UhhSYxiYICg+O
QUqPr6KvRpeVDL6akV39x0hLflXS8IhfFUtVrt/h1yoseMVamQRTI8SrkswunG5o9XLR3plhcChjapp/

QBd5R/W0vr7UPHizrbiGQs62EHj4Va/BrDrKfiEAl8o6EfTmo5gKEu5sF5lOcelrnzaG4oJr0it8vu4/

jLteqBtzNx2/rQQYwn9xYjLutJ84DOftpYoTAViPSX
JJNaKUrnU+s8ik6gsqpHuzo8txVeDQJ3dmvKi4DPZJXcTn5wzOdeVPXUK8dqGJBPPW5KIhy/ikkd/g9t

YX+uFi1BITDwzL+OGgIVFG6SW471l+aQ0AenBy8gox9n7C1HSfpnB2Zj3sCnm7gQC/TqPVmbwBaN4IIU

CqtYBkkij1lD7lt487lRBWTucj+kVBkXY8CvXKFHUW
qndkU62ngOWqCYgRFQ86c91PyHwSjcU5zW+ka5chQ0pvMNUrt9MaM+stIMryYsJItex2gEPl2Drkupo0

pqvNEo8XgewVVCMqp7CHg9B7CTjKLaHH8yioD6/T3GWjYl9TI9yqEAqhJNIq3dZRjrRMPWxdwgvhpFXP

M6CbXYffbZJuAmhVEju7bLIxgICo2Y2S9WE7S/ixfb
Kmy3rlUBnpeXKX3ykdzWpRX4BZBGNjnH/1gXcx/aoNZoWOhXo6ZBsFpEds+hH/q1ILN9etvlQyslDICd

p1j9JnKRCj3NDjZANepi64pevnJm/axDfz9IZXV2VbqOuljC/7K9v8Nm0Dg2Ruo6run3jsBUcPPnOgB0

HWNLAV0n79AJk9+El4JzhXcsZrTVJpJRE51IPJp+c3
RP33icIymJ5jOu8ne+x9T+h7m3+ktH4gY/ULofyDoGsMmjXTRddG5Qq+yRu/iW5nhJtF6wZa9+vODz/I

h7aNI/8Ra+D/GbFS/mNNO9x/uUiZ1hVSJmVwqCtsbOG4KQoTpi98aPJ4glYzvs7YV1bYirp3I4W8QGeO

zNuYc/RDpwLTx11MnXU6KSZ814EY/K+TBK2DATGUQw
1pphYXlPpwRhSH8eEMfmurqv6WP4t016pA4sOlG1bHUP9990+yY0mOAPqSxZq3ZF5ljPDOwahc080/V3

GTtAUm+Ujgqw2zI5j+vznz/P8aHD+QYTCOcxykk/bfdFukHVltKyvUMjUnZAv0GU+t/WcA+T8/ohT51C

uYho2U62eTAN93uS5KcheenifbomVxj7oPZqz27hne
RN6HOzUcPc2ys2uHrJ7UpxKnypn3vY/VOOIhQTgqfh7tTC2CjTOcNZi8R4NVO7VpgbaaXmLLg2lA+f2m

voG6j0GyF8S8jWA6Cn8c1IzCj8faAD0sWilic1JDn1PdU0ulW1VJz2/wF70LFUyfBVV5I/SctY7sqNVS

kxmqbvmYOnBOlRnNCl8rpni2lsK5ryKnuiJhwuPduc
/4l02xJNcItNs08p98i0wQ/h8H08cOa9ATtFaO8JtxneOXake/rKs+EiIsuDb3xLgsx9dEyhGOpGmVBf

ia5SFoW2mlMj4LgUsM9HV5WtOtqkGU9PiG1eoWsn9Cv724FZDfeemXlfKAxvvcdnSPoc0Z3oqRseVAtN

EVkrBUo41J0LzLGzUO3UDDfygDUvhUk1Nuo5MIfBXe
5wKk/EMehGxr5yCHFB3hGvc2QSQ703kGeWyd3WRUJjeSFGOyqmjvQ6yRzVFVHDLSU4TsruZzEdqQ49jn

WGIiqhkoX5DqtbIHUIf7jMENluhPj+CV54PdpZzejaAhGYi+I3pr4s2Pux5J8zAywlf8sfhVr0VmdttL

uO4SxQ9BWRlhn7odu0SwcGnmAWVoYnYPfRVlKjvZN8
vYWjMtbdmQ7l3xXNEnfolih+dgwTBKC5QuuN9jZ8wxyUAwrAZGmDIyOVsVHJNzumf7s39SfHkl+9W3xJ

U14fGga7aaWnmjxKKxPT5gi/yPj3NGz47V0goPdhibzxzekNLoRYLLC4IYIgAgjviR9rClpQpUiJO/iZ

GDn6ZT0sH4t5zCPk0+I+kZDpGPJQVQBm6EH2vZzF3P
SZJcfRO+OOugTP5DJzZ7CqcnGhl7Hgt5Pd6f2vfXL68U28YE9UZWPG3ef6o09Gvmx3rmUBIeMvjw4/r7

hkDv7VCIwDEV6ClVzfTXf3J/PrzXFPZinvvcsyR9X8O0+WIUrxM5gYQt1jnH6+Dj484tqVIukyyUqWH1

/JH39WWpEL1bo9TJao16b2hr883P7O82Jud4XOkrPt
TRK0djHfLPthPzNZhGV4hXc/djoflK+TJhtw3T61n43pNhHDr+J9GpcRg8TTcL1c2Km0T0k+qv5DIyQi

0my3gZOxIvQWEEgUg+xgmU6SraVIkwUVsMcgiDMfDhDz4SIeaGmU459YYU9qk6Ea0zVNrnZbyf9sL0FE

IIqCZ8Bc0e84d3EVrt3YC3yXk6UcUYOKeicfKt0NSB
0q3CJkWd9JIpiofTm3xEY2NqGp6jgtvxvq1EfFdRvp7/RxhNyFQr2jQgI/OjnnYbBCzpZKMrrSPk+1dl

Uw5inRjaNXzASWxdUodAswbtJhXg7RaPQeUIQc3nD6oHw2v0CX41KxcKvtqq98SNMHPKls7K0wDZvJK/

QjtZICZYPAKnlHMnXpU+tG4ZmUSErlccGv78WcrUEe
IidqmzNZ07Oy2fW+hFb1Y4Succ0Klt9v9sZGkkNTiI7c5jV/oSjplXi7a3joH70j1Hxbsy1XmbYMvEN1

V2/gbzZ/YAZRod9CpfnTY3Gy5qzmAVwY58Z1rREDAydvkL8yBn9eX62kridStMY6YgD+829CgXhPLbXc

jL8d3beBJ7S831J2ey67Exhu8AMMUEPWE1qCexjyvq
TDoO6jxYJuhm3cH3TR5xQja3peZqRdoY3JmI1vwlJB0iT8q++42OJIc9s2UjG2rA2ouq/wlovfrLgJH1

IeYkYHrHc6I/tt1Fpj6qDwgVuYmb6KuiA3s3C9VS6qBlWW4xgeIWG2/J3x2f/+oRgyr3BMO9rmr36cn3

BrPUu9gt3vKZEEYAgecfUYdC4ene5y7fbpZ3Uo1AFA
RXKymY4wWH/j4bAcPjxhgkwexlGpqHPTXimUoOKp4pCqBCJqtfzKyYQCqs8ITwf8fVq+9NgwAbsWRlc9

hsC46h9kC+/RHU3yw4AoK6EQT0o9XzuKvz1eZKVucvTnPJS3i3DqE3umvvMdz+mS9kW3XEcnAhjmU/cp

DLcCosjWkfWUu8wieHPefUSBaxua+YuuTkp+iQ0CS/
Z2N9SfUk3Kzboez2zmdI+RlHmNkm1/BPTDOJoB0OlXMvZ3VM8wjj34et8jxKl5MxO8qdHjQpLpvC+Fanta

yUhCQggD4mSwrc181qTf1qC3wU5wTWl3ARoihRT9sik4+tO3rc6SSSX3PG4alIIYwRiYGzA4CdmZyHmi

h6qMLXGuqWV1qKejAohbp6mQ6i9reJCq5GeX3v0O15
/NKTqDaU84nPdNvrjlpbir4JqVBLBYkvccQmqqxFPSwhineWYsTORutpGcaj2FgIP46+Kdrs8BWDadjz

JwW9O/91xsc6GH6Wzt+FdhP41xgFElRIazhjush3DO4vj1QoypMdAyYQOg8dzL6TSauJHKhothYRwDZG

mFu61gq5cCpwRbAclBKXhvvI73ltXJa87/FfXABqUo
UMFN0guy0/uw0ZP6fv9YnhMHmi2EQ3OV7rAnn1N+H0owQ0DlVV+IN1qhIQIMeEo26hBjIcFP6mBAiFV0

eIvrW5ABFQVLpFJIrT85JuDeb+tvICj+vg2TOzFArnu2XGdAOFiHpXwKds34g5I2zE3+yM4wRqFhqocy

J98qQgSX5PBUG1hJ4+zMr9SjooeB8FlJ6KnQGLmgMT
O9OFKpwAZAnSMk6/7+L03E2UNc4T3AN0mCsc7WDa0AlUwFqfq0s5quRN+TYpjFoaiawK3pPR52IcVTkz

7tbRrWWfCqB134RmuANVUMn/83/i9guB/qo7eHR0P0f22wWX3APlro1tZcW5T+fDg1tJWVr8/f9Xq+yl

/OGeaMa//9F6QPapFBVK7T1k8xZyJcP2wUoLTrmzJ8
HQH3BdQtQIF/1uuUQxsdjSaEzy3FZsWVMkrsoy8HcT2j2H131FKd0wk8N6lkEpNPt5yPz26F1KxxU4Ss

fXVqf+uVYR1hbDdDtV0KmrZLrf9Djjtl//BZ/iJ5OkzHmkYfP/r9z6MrT0pvbj2Rnse68IjFQfb+vAh8

8fz3qIlGkFL7tmliQxiS0yCw0wgAn8zIRVp7b3982H
QiyhfYLguIvFig1sLh/P0A77Zhl44oA5c5uEY5zFV4lALBe2BaeqwMNRHNyqt1jDAmLc/ZzJSN0ugzgr

pohmowkA1bZ/hki2wB8q9vf6HwOkkrXWyBemMKXIjxVnURarzseWE7qN1ujpPrMFixzW3YV76W1DQcfP

QiiDb7MMGgXIhcDywJ4qfOsus6Wi/fVF8LG2JEkRiQ
4cf/hVbxQFqOS3CyoMdUVNTqZ518vgTa8gvr7FDfmU0uxmO52f6rj8g9ik/vm6XmyA4bXBjfk2dIg3t1

qNBp/Qt71O13C7rH6D5YF2JDhnZSiJCKveyz1E3p0x5qQCWeDuTKl7980mEjxUU8on33xK6VJ6GkyTx7

/VZv69X51p/tso284KyHJuGqoXvceXgwuZBW53DCPe
iYrUGIINOUoa9df8CMuJXgCIq85VaAlDMWwruMtZyydKvQImrY7HWVUn7LldQFLmSRSe1tuZwXrM0TwX

11NAv4b6LlKIZvb5gB8KIY6UCo12PXTeihu2esdPEeUWsv1ZQC6o5l9UYKzELJdQZTa7HE22ccLa0On7

X7o0IoTD3Mr7gaFLgw+bS0fZ9dYF74G6w5flfzst8I
MOiTZm18GktNqdeJJA32TqhQ0U7CFFgiJJMnJPeosnqPuOAfSeyQ090uXKhiqa2pe7nJ/rL2A4pxcFeX

mw+EaVUU9uXWXYa5Z4JKsmVNOiUKQFTT3Uz8EgVkP/7V87hlj0p/A0xEj56VLr/5ZxDnsBn2WZPEbC/p

C3MhRE8WEh2Y7uyCRznfJsEChAqlMRjtNcXw6TaB47
oo2lEUa4DH4wsS7BOjv0UUXI4e6DYNYCDTA70YvCVtPpcYW7MW2WapgWVxpN90bTVDYYL/6KRP+DEM1f

OYbqz7IQZgUQ2CtTRcCJ0zGQeAgxkJ5oG0XsJeKV9mv5pl07V5h1MdHMjiitLVziXFtCsTf3j4IILqny

zl3THyqO9044W92yZTS+KWwhDBhHRvbnSVuC2a2GFQ
Q2Xo39x+3BoQHqdVGaeJ3HhZswRG2PKAsrMjsuboC+dAxz4d4UQqfl+GlKeN+KgGSvAVdQy7+oAMIgd6

HXOrcsCR41rHtzm4heYpE9oV+cOVJBLBArSIVPzD2gwecBrRCvUmjF4GSmyi6m5AQKdM4e2CbardcPLR

doKpLsiP6K6pWc+gz5ZMisO3L5qYiYCKddphKi5Z+Q
SHxrsiH/in2FjJghkxj+AYdzkTAwC3HooKLxObS82PqTuICdRDevFaQmJ/fovF/CH3Zc76t9Uqxe0C7B

ZkXLEcbo3I8ZL6XFDqDacou8cxySKCndpmcJdQphKNL3KTmGVh8JCxGNpfYLLsMW85cd+QKbTQNm3Aou

fjSjEZ+IE+Fmm7LL0XRxC9JWG/JWH3i6r0+h9SAXpK
TKMZkf2oYnklWCwVWhfC+7lHkk/5QZ+mG+ROiEh5uUo8jPw2ieDhH9jXObiIVOKc8HxcpfiFjnHRBSEQ

n16Q5GQYvEioRIMLo2nRboS3cgJzcU3FjqZuu/xHbGTHYbuEk6I7F2OkH8iERFuyhg8/6DSNwolxdjto

mIcmW9WNT75Ib28nCVbjQs1YQ9UbJzilgI8/8JHISO
t7opu/Ulm3D/ign/VDwG1c5EhtKqohFVpDTpyVbdc0Uwv/3/G6ipmAHXWJ1gVBV9+g7bZ1aSaJgTtsZQ

hSNagfX99AeoexHSRmoI6Nev8LdHOQe9fETW/9uhGOP8pcrNfTFwBCTgb0gz3PECZj8jOR7Ax06JBjnl

YuscECe0fbVM+BsoxY8OJPGIX/iVdpabUjlqlpiPgE
F1HYCVG1fra4QhG1ylCfB8ujjs87tmnVihe2GCMWw3ZIeL8+9YjfS+lUWWWPhSY/S3j5qb++OKxfZs0V

iKyZDAOGaNG2XgTwuYsJ1BDAY8RIGe4E98edOLWGxW5ZWqw0KOfFTLlEvyvHDY683VVgG26raL5jL70/

fQTpHHTygdnI0lLV56nGYZbatyMcosat82KjRqLoju
ZNEn+qYns+WdPaO1HBSD4hSkQNlZ08qUZ813W3H2XGwfHdY2skL8v6Xv7G+8dt3vzam555Adr6Dkj5l8

mW24zZO2RPBriOaWNrbwySd3/t3GLKfmbWxe6moFzCet/IkBhlBytx/URrBrprcXB+0tbno5QQ6HOa1E

iJhzXB6a8ilDT9aqfxipjv3DFDWZR2nBhkRLCGtl4U
UPPp1DRu4+b47UJ3MfvmWcsMeourrO/nNIxLJe55ey2pv9KXcVNchwLbdMACvylcyvVVK0D10GR6ccuY

5yQwz5axppiQphA+H+tvtiZnm20xMYtFisDLhF4aH++gDiFpMFGMjnS58GxbF53Zhw3iWfjya3ak+mjF

QhWrnKG3/ess/LcXrPpjFv2l718Z6zw4GEQrFgLCI6
zF/txhjpcVpT5CF6/++sDGgUrjY4bktrNqu8TdQ+knat56dKtIpUjJ0nXiN0jrhEw/VdW6a1YNbN9S9K

kU+bYEhM58aOF+ZePl1L8D9j+p5bF5Ag+g0I7kvFcydIGgrW+zU3R+l5534VDeqyHQjIjszersf5YD0I

zq2L/bCW0wYW13km880nYIBIRooGBQzIsvS/hjio2k
9O7W1+jS6XGGRRb04h+Z0zlSBg6BAzKWrYTpRSc/owZ4x71rnI7857DQu4M+MnMhA4uAuNPXANgapZxv

DtrJUgSraIMvTsRbacnDjyGlU65VqeAOpXaW3I2ltYCCXAO/ZebpajxhAAGZULsPKYv1o3Gy5e0NesnT

Lp3HbZNJ/7RMpFFOEY/API8m/H7UApAlfD6D2AaMoc
N5TAWfAZe7jEcVnNTpA7Y8lUGbjd6V0qsgpHRC9NWwfOYrB2SIgN4D8QcNqw0Qg5U1wlSH+LlIEgm4Ih

pg8X52Igw1n2hCV8vFg46He+SpAdStTQWU8tRz4wxQDHDta7NZjkedcn3pZLw0iz99Lw+RbTNx2q04CG

ythAeNqbxET9kOQ1744UkiV7GjXMCM1HKuIebp1vgN
6/JNXIND/oTkCcTT6K37J0PlDpj0A9o6Oy/Tv2TuDDvzHLqIHKRM23DoDAxi2YpOHYupOMWdkiYyvzeF

CmNsjOvJu3CQ1orfNVdV+amCqGGtp+79UdX+2EuCXCzqjkJGSXKuDrmoVOcvjOX5ey2y76rCvtTMBEHV

xly6TT1C79S7lqdXl6VfhwocvPsB/WqMwMVaCxUeqt
hHOg6zCmkNUTcYrlLmVlMEdINVm/n66xoRN5sNHSwKQESGgXlrNTpPhS3ESAdHmIwHv0BB4HwZGc4IsT

2OumX4IGA6MH+HkpyUOeXr0QhLNo514xpCSqWSzYR/R4KRH16OO6lOeWuV8bOJQQp1Dkzgm2oAxjdwSp

pwaS+PEbF04WK+jhAXaQ1Z4Mqn2SnJcAO+4TwV4Aff
7QD3HGWfWCsJGUFWdsRsuEK5S+CeGQq3xhfZ076f+y3CyswdlTL+fSmo2GhIDtK0IEylqHkUD1lMDeun

gGgTDJUi6IBhkdUXBipypEuQP6X110GR1qlaznPc6DLvyy8Uz/uR3kT4ZokwUol0qxCfPglnMXpH57uZ

MS5EAAwdoa/0OYsNF4k+2bMvZFRWgWXJb17D0mJ6iu
/rv+hx3MJmnVB05rJjYEx0ZupB0u+ZMx4JgV8Sptjfg/Nws7Ng+GTAC0Y7WeBD27v/gGnV8hlLY6VlyL

CpK2tZWHewQb+WOSGQuxkeJ5S8WpYCgduey3EbVukjDjUB1DUPiJtT2cTMv8EBLBiFk6CZu78l8WYOc1

kjwFAcO2s8kkPhRnpq73jxQu2TB8hLaxh4mBg+dbZ5
k8PUVANByfPo8HFxqgBTqbN83qIJZtTMES6h67qY50a0LQKqJcN8Kre/INWsBbEBvhDUEvU72R8wRoGe

NkJVBhdlzyuOSHGBaljsHBpu7g1UHdM6Bp/PgFebwpoRlZVkBnP+P3iWJc0Q1Rhi9v2jsnPJ6lCVNX1k

Ak926wBlusfNUka1+l7gpkINyQ2Dar597Pbq5dZY3u
bt2tIvsOGY/HegZcmAEnbF1u/oiYILJ06fAXaNRG4vKHseBzICPvVHItSBAbCTyjShKobrfXew3vrol/

bRZhnd2TPKyOi0RraU9OrNXtXnu7dw4zIzVcz9h/PE7uf2FvbiFlmNDhNyZ5iA15GMGGlXRmJrXPCj3z

3neP+teF+l0tWil41U/oYSdFvlY3+iRHahz14F7DC9
ekuGHKQhnGbfvKvVJjTvcetvGr6oRgKIBIyNhSJa4hurU6i14c4OiThlkjBck1gCG82lvcqEWFWv0bEf

4ap9a5HDtkWz97JeigX/99gdGJISEe2Dm4K3CNwqgONjbXbrS4HpxI4gvcRpIl7UWktSTHqMv94DbNj7

Vx0nPH1iNQKUpoAEn+Cy0ybUqR+laQJeYAr+wI5mIc
gjRTMucNoSvCxYjWBnhSGtYG+v8VSHu6osnPchv7dYw+WtfyJ0+wgYEt1hlfiqT5u0lqGpHcAYfwl5CS

x4vjuIjfb2zDGguCgPtw2YyjahffTkRRl4514HtfrPyhCc8Ajz9LdimpzeC3YAKibEhrZ1ryaQXsnrGY

AlwUztiAWG+fT3RbbtOY+ICitmnAo86+LaLOVdpgFn
SzF022lCVVDzeiVPSlmYW0XQbMpO+gvU0pS1Oqk7vrFzni2BXqg2LlhlO8aDOyWxZrg4JXQw4wOQkDlv

cGsZf2yvsQHFGzZbLHX/5hquYHQuOKmYIr7CWL7wyGA9QTe3ZNTcqy0maHpatfdzLlDVx7Dsy9WHdLlt

UqBI8+vTSO6Nh6sVejetpLvLNuLhIsPZFg4X6e6FkF
BFUxjsnuEKwzyzGqe+w2Aqfp7p2bm+3FZgapkTy1kmJ8aUrc/TH/8KGaZjxKM+S8HjxhP2lh3TdKl0cn

z4J9llsv+n4vC+Ayqbo4mMiW4aaxXNWMeGjGUk4haYRb7oBB+5LSUtmUTPApkeN+KL6TfnOwLY/UHOyE

U05TSypZdKoJQ77hoD/zOguv+UUzBL+isxW2Ytk16u
YdxIl8jCg60V9pJ//NfLYyOcgcEZsYqNhMJuagIP7ro/+N43rA1PLiZlrbm55H3HVRv/z9tYWcTxgBXw

PauJTnMImy5Ud98f5fUr6MHCS68p/RSnXdyXmOhILmKZOwJZAPZEJAK6cjXtI269HuOEUCin/uRDsoWm

LtUbLRc6qCnRwYJS1dZMQryDRutjD39jj0RKzBIhRo
F0X5PX/cfZP/5FzZfdin2j5bq66N4LXV12hnYoD+8MK1FZ8toFE6exrgS/CC1XWrhzxSXkqOV+ffUf0M

mY1zbFIwntqGKid9+g3QLWZqnSv3Q1jehTOPtbNLpHdWyHf/h7bOpCG3WVI7NsJSDdaw/p1rFhzBBhCd

hfr98oTvJ0amjP+gLsJvfgQIsUxl7x96oVT3FIymUa
iW7li/kt7Cq0mK/dyqZNl40ZVCrqdttIGg95od0p2UaUqH52t5YZ3ewWpTENRF8qa4w69wSWxOvDIs0V

Nl6iDPPmKEgLz+CTJpHUfm4fh09/6tfXzu8hk+EQaVwSX2JGZ1TihImhHOxdrtVs04JDqhYx4CP0VbEQ

wFt64yoLs+Gb+ByubJ0Bw6/Db+Y0AEmaCeRig4BWmV
qamVk3EPk/A9oE7NM/iCeoOhDsLmT4eqKCP3bMm94DM2kAbmisoY0L3djTJFE4fs6M0FVAmXI+kKz7qz

eGiAaVNs1cSY54S/sfFOSWFG2YQa63UbEpUgHdzDwEituqwyFbHyEXmvFUWg2t9Tfystt7S4QOSWR64q

ffIqXspR2D6IDgq/KQOnBX6lRaWa5NkRotW+EZH9Eh
FMtZsLmUrhiAk7SHHZWpREmjeiwtdZGbFNTIjqYWVv23GnDmP+wIbulwnPG6gSJgu/RQ+/9PPekV3hV9

WOCCYD2V4/hxR9C+ZDOIGM2JEQ19cOHipkDuNLy/HlOVlzaMwBddyIM/Zf3b7wFgCiThKww3QKcH1udO

IX69EvbXSfh8Hu/qEpvzFGjEXYKMh/DzLR3qRw76ue
e+glstYOFl+j/Te66zHJ9xEEiPQ1N/SJRVJTjBfF0sj/INtQag6UVw66QLggJ7ahusv9yYiCBVmiucwh

IgbpqMDCkmYPzcK2AC0dDTSfBZXwfk2zy3Us0qq8LSTV43C/idr/jKL7BhPksulwjNivHHJBuERhhGjw

b/veGIoSKq2bDQkiuT6Ad/gpe3HXczOWS01xN38I+H
q0jVk+md6J2o2ff3E9mL4w7PUnSqyWWWgZRZnCYeQaQ5ZtbXbWlm86cyi4/5TG84my6UfbLBjWAZQkZr

R5EfLHuOH1p/V7BfByhw4eOfo3eLQ10cqVVcLbOhs6FdnzVMV96MORUbeME3QVUSH4HCz0ltJe3xodRC

c/1CshcxvReiQ1wmzir740vdNvtUwDIHo73hGSyatN
or8yF1jExrWCUTVCwwDF/fvZ7eyWIdMrrGFW/TYwhVYoyrj+Uv+Q3JCj2nzVuHPtcB2ZYHPi8JcQvNFV

3HU1oWffE/RqKdCKaMRdc38gRujgt1p6hfn2yn/p3wNC75g5+5iSWxx0hv0YAA3Nh4lVJP1rYnHstCGm

POo4eH7rMHVBhtQ2YUYfiLQIO5icPlE7dK1vCTmoPd
yODBjw8pRvifDmIap/E0JXz/2XMI6lmTCw0ojNdbhetPnUO/qj+HmakxR9bY0waoucDdlzcs+zBoC4jv

BC93vbAlRh9n6cymHrIijQQdkEhMGiyHB4nE0Ve7LTvpzM1OkBQv9RI+18caIgB2FfpbujssBZdf9YsI

Tb0Q/uNeURa0BDdPXqMCXDvekh2hv3f27dr7r0t1LE
B/OcheMv6b/lRLB30icTAU2GV1z3/VWTBt+NVng/MXljTHyrhKhYqVCUfQ9PI1HdUH0GvH1ocPcWeSf5

O14XFFsk/phCXhUgOIK+AP2XQWvG3wQATDC9tbU66Ck8Xw7zzUQWkxt//ys36jNnbAZ0k+VICUf6CiKv

r9BfCeLjv+VUmPc5CJH8tCu46AALybvUOzMgdPCIBG
PizOezXG0KSRuT7J8jkjvqSWN9/kP/jUCq4FwStTkaEJ/AROowDnBnh9nyrp3E4Fxm2svg4f+2B+I55I

CU/yWxlaGBPCuSLh6BtCiZCLmwbwZ2OjdgPn88AdrujdB5iqtNtAGGbAm8otuyvbikMyn0T+8cEdPv+f

yNgoTqYuXWHX5z6TjlI2HAQpZrnmML/MxDrhEUrdC+
+/zyo+SILOpd3cyZnUWunfQndxHGn2+rmadEd/eMgbWIMG2ccRzlW/KwKJ6EJWkkNnWF5uLbXoe22Mth

f5m74U4rCoWTMLWm/VUb1DKevOuvkEJbab33X5tTNhuKEWfkTWC772U1rowFGnRvo/GHjLJ7LaYkQvPq

fehU9i5hqVaxroI6wV5Ij0L42VQm+r/JFx+71glqwo
/XZ/gMWmvWHwMoTi1mf7hjvVaAeNzltnhCXOJ/rxbKU0V8D6rRZTd7loslPYjcnup4We3h4xFGEYPYSh

eYsGfcMSayeDypohJboR1RySiFZKEwGGP1iNapf+p9OxZFCXYpa/vTOQLwpU35FgM8Htih1XfVo360n3

q9aXbD6UgeBsDqQVl6h2PeaoQQa47+XXT6XYWbXkf6
LKskJK7T6psR6dqnTPi8dqQWBVoRDiaq61jrGOVKhv7YUNSHRtUgkmogPi5UoB2hySH4dHCyLnLCphGh

VSMbEVFPSimWXjYCKQlm76xMSmgoK6yuN3LBTo2G6E8vugQUEyfN+iyWebkEt8Ck0jocSa2puhJtRsGU

7uScCpnjH0Mje+OdhEhNDfPafb6VYctT8PfyO3SM9x
cmgztwAeGdvCTzCHPkAAnjLgzJlBf9eXBgDli/Pr89Nxp6OPUjrvDsOWJ5OCIl8G530QJaFPBPiaRHLz

k1qnzeFa191XbDomEAW9f7KiQbw/Vpe8gmQgKbreigDUfqSOxcJRD7+9X677wx5ZZEqm08+1ySsyvpEB

/KAnbOdeHCEP8QcwtQ3x+KH/vjQ+vC/ArhN/lHIn7n
XPKrhiPOIo/e7mQbVFrM7C3N/NRJwIcXaMPpNsaj6Du3ANi1/XLKoggPgPEMKOjmRqIfXcp6PZLaNQl4

HnWBgxvcpes5vWZmhO2yRAl/31IIU+bta08rQ57eYAfFzgzEIP4/jUB3Dl4RVbuTj3cxRTcEJAHHhwF5

8InFY3P4sgoF9e5Puf+V+/9hdbBZGyebEGUsWVI/nl
kUZvzqMaqfepXH/MVUIhgZHwAITEL1UmGKh8IF0u1M/wahVayAruBY7tHy4DZRUzniAzGMQKU7rUw4Md

elvlEkk6gvTVHpmrvujMue/Ykd8NcjbK3GsP/rb3OfuwcROYiN6vcP5Dgfm8RvYwLWSKwKPmro8Ocft3

GZyKJ+y4H2Ys/WoIKETfjGvBt5FwqBsuFqYTsBTf3O
MdTxf9Zk1rIMU+0UaYMoPmg2Oi3Y4vaqHnZn3ayE1JzsWbKNtXhOTLEphDZUDHeU/LepeH3zrPJca8ar

iLllFhtRwhbUYbfrhiiwxoFFHxqOehfI85hkl3hTELsI7ypkiZAJnOoIgva4BT/LCv0csB+y+BtGyItw

f5M8K6nFZFfUlpZC7uEXktRc0M3aLqaHVBqovietcS
5pHnNblnykeajszhumGG97LMrfM6FdWMyZH8FjYhjZlmuWw0+wJh5s541S6yzeX8j7Aho5fslfbUs4Pk

f98spmUM0M5jq2ocqLrSwRqbM5Xg+6Ta2kPwBNuNyds13RorCCKFw7/bubcEuK01uxsZLvMIzj5gXUff

vAwvMQm3gcW2/ayN9ooqtxdTSw+khk1K2tn2X/N6Xb
/Rq67uvm7FpGzPRWGTxc8xVOzi6YP+zoyTila8ib5KiVZ94nOjDu5VY3F5AzE0yhp8NuEN/dyiUMOi49

J8jWp09fplQzF0twb9vR/bSzsFygFC+F9RyB53lvp0GNrduNrTiKKqBQsFrRzaqnxdSe542vSG2y1NkF

IsBt3Oeju6QICYEj5ix1lSNG56gt6WfB45EJQ9Nj4Z
QQ8yYgBaEPRIVOrwsaYg0lk1Rwd8xeVC4rUQ4s8kQ4zFrLxbLiNhrttl8IPv+1BL79jUemcJyW0P9aob

JonleamuMu2tFTh6bZKTM7OgbTqDWLPK0UhImgf5n9xKv8UyunMY7qHSfL2ZYJttLurRgxSolfHgkQDD

uzwbhyk6Y9Ou/nEJ68J3mPxpq3km3Yh4KP8ZF1jJks
ZRV2Q08QVxZd1kXAKqgFqLneomsn95uIrOtNzdTWykr3M0WysKEPhwdcfpmjiWFggSrO2uKC+vM2pFoM

cLUazyk7y80GPDptfnktKBAzdIdm/f0ipE+3KR5S/xvkKWivYam2cXYAtQpF4PMY+tEiuhtZXGmQU6bQ

NatV9nbXGh25NZQPHKHkpdoOE8fXVVW/kqzkQOMq5j
SfjfMvltSZkQR4Va1muPC085wcaFStIDThZdbYoyRwLEWlNpAllj5rLZUA+rwt4pClcMiXppvT/+Q4VB

QjaJjKY43u1R+bzvlNtI/prcmqIqi5yYERoEl+9gFJoqMlzCaqxoj7wHZtTnrwQ/3mxqDbz6BAzChSu8

7rSH2nep+9SmdMqWPNZrvOxYmDIy6SsdglSDKISeA+
GXeydjD7hnC6vhSQ92i5CxFFvOW3eXbubkfSEMS3GPM6NKjh5BVpi41hQxRPGBFOHN8+Zg0511rVKZcg

A314czjo3ygCpnHWUeobUaHFH0xhcq98N/PmCbSfS7F76q/Twyx3zpKQoWU8PF0XQH9b7VtoYaulH4nt

lz+ljd4p7nILU7USNxQiyOJf1/M1emqNB+A+uISiub
9NLqlGTC4g1XHws6SMo3S9arR6mNJM+5/a8sH4QMYEgvKSZgqoFbAcCfjnwafAsLBaaTDrWhzKPjEx1L

gly0Bf5xxj+Q4J5AVzYeWBAjs5J5cG+2H5faC2zf/9BSBMe4VUpBNzuI/+DurYLa/EZ57HUzlSpIsxbY

PnKrbVWkqZIdSxCDkDFMsoGWiv81BF/bUjxLKUN2NY
VA0QJg9T7HjGd1Oj9I0ae/v4T5cZg46ytlxtTdMs0PemgrwFVRo2KdH23vEZ04vSPpP6Cb9yFMEk9v7l

N5TknTzih6jeC7itjKuMDvP/x0mDYeamEB7F/RRG9NZwjn+prOYNpp26iQkbb/F6UV0TFSLGtcQpk9J4

b6qWcPSTrpDUFvFrhByrt+iY+xE3fmYRufEnutWQWI
MCVQBTVbvqKVVuxhmr7KvEO/X9K3DJSRPBre+KdbLwcsyGH+8IWn7RSeyRxh0e9q0b+A3cvbK6htma0T

JVsmiz2GZKWSHtVGkq6ydSgQRcArLEoZ8M+YU0lh59rOdOZfxQZ4vhpiAI6imBY6xNGk8l0QU5RbO17l

e1Mv/QiS+xT1rK3SqKMRroMVmeCqrs3UrQFh4nE2/E
+lbJRZ4ri46Y0jdvcHNZ/2vuFOfmvWrYTsttQ7i9wp97G4LE4CsgnAe6Pd1t5smOJ18yUGjXtAm5mtJe

MVVFfI/HcTkX/KasxXNCqQv3LjhogOTod0Q0ElVlc9D01F+RDLvfMtMF4zGLWkZe8XBpzgyhY2ERDKd0

CA2iJ/wmwmOnqI0zF1AYGr/GoRlHUyNYXuOqIteF7O
utDKhzjav8BiyMb5+72i5d8ot2NP3gzYObVPmjND+km4HcOysv/U24p8YHF1jv+Yt3FvV+6Ws5i63K/2

hT3p3tViQ8SynsWyJdOGWJwchM/o7MxQmF0ft22gp0sOY/BeqgXh/cfz5jMXgYPszIQRh0+LVBFJThLQ

FEMV4VHMVFW93ZbL4mbphC/XZR1TEfRgqkR9aLca7n
VQbm/7wqGk+JwJC/c4EuFzLFHuHjqBUajuHKKSw/IfEuGIbxWusOoTIj8LcZ7KK5EaaekVxelFsB62Pq

/b/Zweac0ypYpH2LCR1bhXYIcWSjzw4uofUy2g0u29jJn5zWqaLipyw6vfrw9ju+dRRvrzLBwmrUHLCw

gt//Mmnf8/9eVwlFt4KGFdwFXdYhEs3G732vHuxukO
88wAOwBlwiFvil4/+t8GUzMTwgzkgt3QuagycAe/jbe35BE8JGqYTg/YQR6LQ8h2yKoFYdwGLbJiaiWo

vevYLwy5SaRKsszjAmZtSJGY+7cZxDcTFe4RoRWEiRyP+eEL8+dJ5ltEexG/7ABRb9XC99LGksY3r/rm

yeUopiZ193oetfIVd3tpPHNJSicBrpJzvoq1H3sC4K
NHyl3J00c5WHqLlAw5t5R6djaMMwkoqgs6qPKqXRP4Du46b1auBC5h/DCvWcudk5R7ii2lJVGLQmigKV

T8VfuG6Dy4+1OKOVEgwHxVG9LaqJbd+qtgzNgWo2/0zuuweEEVo06JKtF/9Lmb8EBwAvBVDGsRORBrkE

s85pF6SE/Fon9/33XiT/WfHA76JaB6ISHfptSxXJ3W
a0iO3jYl0njlCtv3/491A2XKl+DqbHTtEgDhcqpeMlmCZ2gI6UVo0ehdUWO54q/TGc87xmLO5T26F3wm

qFx9U+vFmsXi++Delialh+6xnqXs/EzFu5+dxgzRe1D8nPgvBb4UB1jBW7ex8KIVSmkIvxA0nINoa3AkTnLy

DwC8A/ga+7y/WvEEmxD91QzohpOVF6AgEU0bjXxReu
LsLTohrFMHIhNlVbq+59n8glp9bsEl6REE1UebC3Lgn2gNsahIiZBcg3/zTd/QOEVl+8fblcn6ANDdCm

fsG/BtkH23yBroAlzdZFxgTWE7XQQhIBrvaXhbk2utEWP6sggV2kkP/GvNBJ5ob7q8FJuXX9o1sGl8qC

/PsY8JUfJlaxbdEPuYLOfPuYHeJYnpqxCtrpasxtWP
mYvx7ioxUdhMAhp27s5QWxJVaOcf3F5mOS9/9+ikALjR1MwpRYa5bk57v+RDIo/BEu96ZVwXrQFIIYzt

rQcARsXT+BT6uH8lbAQk7HuTQD8ZBjr9VYz3d+bI4SAXHZn0Q00npVCIragFKo4PM/3+u7CDvse5LYpG

8XC4PCKncXJME7tKtRABE+0NzzfHFxWDxbq8+UNTX0
Zddc6UMriGk2wxxywofSZbzId85dNpKdbjdwN+YgswWU1oWr/fWVxeI4M7at2abE8cdCuHhxjC+iukGE

RgMoM3Yl9LqNZa7/ZbqylmYhb9r26EWYrSLOkHj6UY7YFcOoK4VvU1kdaFgxtQUNJfVft31IwtGtoJx9

azFmFN777lkQ4+JQsz+hq4waInCuNnkZOsmeNU21T+
3W6EuQrKZG8rFA61dSu7/h1bQIgzjgyvFNpubQKsHz+Qdhfm6n4ZLSZBi4eR7xzgJlzYJ/nwtT76MnvA

LVBaQL7SbJ2g9JHBNyCnNa1bBHwV9sfpOzZHam7L5uZqYeGiy4GbJip91vfP4jcgoAThEMRzETmZzRwE

lFuvC/+AXwfuaQrKO6hzpzcLkW9mLqZiHc2jsDlbpz
WahjIagsY7m9CNQDDvvDnrGsd8zPYE6Kslq1I0HKkHJ59y6y8gvT9wFxuJrREpVAxZL2/fkcwLmXoLvK

5RG7w54zmQRGRL67WfIRyXChha33twSk1MgcixjMWm/gG6j8JHFuzTK5RSjWb4Z9Lvel4rn9im+ImU0q

1VO1GMe7uGns9quwGDeIWJYahNqv5x3G+3DhLtxuH1
fMyDg4R8SPOUccmHsyb6BjXZ3ZAUYFFNHn3nu67x6nNDyh9P9evQ1Zdgp5d7H5UZQmA1uCOUTFhBRnpO

o5b0W5xQSO1tjxQRY19BRHfGW33yyv+TgXg/sORpBAXkHS1jZk93fFSc9iNCMm8um9Z4TxqXHQRCLWzB

MuTPXSpNDkAxxGUSZWUl0B5xS7F498gTwkaAvkwjo8
0JVyUMizDL/LteDuYN5g3olGc7JsiITRn07ulVvvA3U/RJ4ykBjMF++uRsj7i8nSxxZ97YYyvOxcdxWT

W8+ZFrXyK9cckHoPG7c9zeC+gZUI9t7nDXuKx7/ZhngHDjUdKW/XLVdmO92nZ9mUwwB7xNL8HFfi8RIw

69726qFt6MTnFw5gAyZjvVaEjRVksEcKzIpH6KRUdx
7XtWSG29LUB0kfMuItKZw6BdeeL6MsOy5o3JtOADrlqXSId4KWi61WQqtIBg/XmTpp8hgzE7uWQnXz6/

ZJJkRP5WxFLD66dmRd6xv69/c/FZ8iJNZj+K3WpAeo0UaTwujqhwxZ6l9PGpev/tFlLfszHtAClT+bey

6K/Dk73ZqJsh9exHaMUHNhFy0BnoO01o7fpym5/w37
GZJDYr6gu3g7fQ1GK4rXWQH6+fgOr+m+245i7uj0+kj0cysuhLmjEXKOYf7MWeLvj0taf7qqoCYAm4vI

YRoe93ExbpjIP3O+aPEO3Hahu0YM2TwchH71nrEDGcCGZFag8bATIg+Rxm7s4Sr43N2O01rbuocerQPy

KgR0/P9vV8i2GqwbTnAUOxB+90nDY0n5JFK+Jd72mX
UQnhUkplmDGzhcs/J33OwQHg6kjjAEV9UAOUTKqaYfKISTvLOp6/kuMSbPJb5SRmx0ikVtg+xSs7GQsA

8qfpBsMvyibookflDDcnLdyvDS9V47m/CPJMUMYC8BR4EchO+SMUI7B0zznDSnEi6z2M45wmi3UStW8y

Cpl7300WvK0O66u+IBis1g4pQKtDEVYY08SsxWKnWN
/I5539XB14EfrYPIvfrAso7leUVR1kcXB1Zx/xvhpzh63ykBMd97Ky2nfqGmSK19eQrRUn6LePDd7DuE

nV1UZo4S8vzTXnTlo7AKI+6eGVsy1wHE3zdP6K4iVA1Ws1s69/hDd05u5EeB0l9UdVCtsuEUXdJrEtpe

cgnsKh8FTVeD8KpSkiXVF87I/4QIqtl93wDseExoec
/2wWP/67ri8igbkMdrhUfCM+OjGgZVJQn53jm86vHsHRaTpeGBrp0HachqFYU4DIy+J+sJ9f8ae3hq3j

omNwW23xkRpEupco0djPzc+Mo1QbtKlHyVNlKg2vN/u9Pj0ROm2x+2RseBJk6U8Cj105vyfrJToOeMWO

lgxi7UlNtQsqojyR122bYGco3D+8lXDUZj2tP04Fpt
ffd8/y/OeGA5Eeu8wwW4CqcsXEJQwciOMGUvjxK51oPL0srhkQLhu2RzFfApY3rfIdc2ID84k0XRbl+d

7fJNGv9kCbcs5xHzBoK/PZTFdIxdFl4mZktd3x0U2vuuAJXZ0okFMd97HNknA/9LWAIHkWbe9HubxZgt

yJKscavWPrH7xh6V506vljl/XAVj2FOIo2biP+NWG3
hGEyFnamoVNDAole+NaXKaJq5dKrGwgbdY3SvVWtC4VVs60sJUqAIwtb3bRol0iY6cGffhdiVxBXb+TH

I+hCMNoDWAu5fjMv5l+q3MF5t+LPWLkpTOaC9e9lpMRaQrtqjcve5pz/JJpjjkFfinKDDsLNQ7epAgbe

Mhl4/6xYdU9LVhQzVmL2nAYfX4sssVeNs4MipRuKp1
Vl1Hdf8+M+OWsUHrtcnmRi0dYRrPagNvceCUW4EbK8QJXVFxQg9fp4f8+QSPOp3rjk6m1m0gOVpiB4qp

K3cuFvoLE367EQ3FcXK/ouLLYb17Jsjan0dunWslF2I3EN/I30hxq8Riggi+arwY/f4YSN5VL0vq/5Fo

cj85OlxNQXYoyRoWhmkQ5PV8UvuLMtxy7GJwo4/8X4
8nEw+g2Id9gVLvXqR/dF/Rscn55+2TbkA+OWiggqdoRg1PAJIc762uPoKjN1TWdt8X0yAVHBo3DKWZqo

DoBLuyC/cUPgIgb96GZOsT9h8r4+FnkPJfy9ziVcQk+kH1Z6AzUSfVz5Op8VbDsVRXIT0/J5WEw/X3x0

N87e0FqDb8ggpyf3Tr6oQsPI1hKl2z7x5tGcWbh8tM
eMadOG+j+we148hD0RC8y1XVy/rSb0VPyU2oemMfHG4u9+CW2Qm8vM8A0n7/GZ3NrZIBRTpMXhHzQiJn

/MVQ9VEoohUD/oZ3P81nyG4noxoq9+N6a5BApftc6iOr4twGJiwewbcRyUEubgrQujmG/CSRh9fkqb5p

QQy73Zjf00hDVuT69ddBjxKKf17ueGBXGn0f9NqP4b
B1ZZ+iu6AK8Xbz+ihNPxtjV03Rifk8Sby1+yrLQYwueuUQKUSOpT0KIheb560qlbaOzI8lDY4DmoXe8F

oxE7gwDCYYtLPU31I9y00XW6C+5rxKKvYi6SvNmOlmfb2k9gbQfy+IPjtkLjsPYYdR36lERivVq8vq5N

hO/sJD0yVzWoO5x78Eh9PCf9QK+LZPLhe9dYpbj3Iw
nbg3djofj5dnbUns/hylS40gTk047VcjueeTXHq2eRgbMOcT+OY2E3X84+PYHGTyHJiUvyxk7psQRMXf

JT30f0KzPvA0B6UWSnTH+N9vMAdBp4kpfwCMaeRpuo7+mze+mRCF0OWqrlRnQtGS/u2p8iW65nFWm0ju

IsifX3Um+ydiwU10ptM35d/WRPO3RU9v2klQKRLQ1i
Lib72urEE728LRxP49teV7vR+AlQFHnlmqWniY8A/zfnLQId7C05FeZU1ycL74HUSgeKaDPWtU/2l5z1

N7o9o9S4o+e69w2SvwkkhCvzUgBhlCOzqFIoFmg+KZhqdkNoyAiNy9iHrPObtN3Zp7hvda6NBXDJJLN8

hOpU/317m24brlDdo7CWIBDoFIHu9j+pbXL92ggCCk
Ktzivet9n/X0MPr+/SNdptZIBXratCZioI6NXbMU25yexxo4dHo8rwZMCEei2WNu4LJrud2GcJkWboFA

rvzk0wwh+LOm1UxWQNqCC+CnbkEB7wWWs8gb4S4nmsfvavk2lszh6Rw2R9RasCzWPG8lxibriHhjOlEa

KuNCjcwUNlnxQ9kgI5KB7elLOLtrUa/kt4sOhwfGxp
LYgsRvrWDScoByc1HVlqaEhpywgRCaHUFl7p3brtA0GR39EkRCj/UpxpaiD5STLXd3q5z7Siu3rxlpbk

W9yy8ouLOkeAszrdZb5MH602PbJOSnSLhWlqdstQ4LtoxphdIr0yH3SDKyuW1MmyQj2wt/WyVTvZRRm8

WWGIvmxLbbEoxQIFq/HO6NvAY+7M3lQbXAJXcB5Prf
5yRGW7fIDJO2k/qT1p/TuuzLWNg2oSLgBQ9aC+LOF5n+AH3bloSYn8GwI98gUGqeQ/e4j0Gw+8O43wdf

NXzlFNzcjTpzP47RTjFqyJykuI0giGlZFy/1MN47pcu5DRjyP428sVFyHByRwKYeV6lm+7eVWbiolRbu

+ydumpp4/1ccakB1plCb1zwKdQSZcTO8mbQIF1Uze6
hAjIJ8NuqfwOg73LN6ODv26aoHmP8FXKfXoyiZVopXxKvcQNVm79mzQ5WRMUVAivZyXsTs1a9ra7f5Vs

DnWN0rJAWitivNBL25TZFkcloXT12+mGI3mPWibKRlBy/74cWYrXFGEXk8YaHRVeYToc6JOoAscdxd32

KGb6HIcHQgFGrO/BPGcU3rpVE+/EBj0jIYDI8UKSSA
i83g1wVpEsvqhhlUviIOB18MtNUJTxgG/8cs3+yPctc5RGOHr6pgnImyO0CEz9n2W9f+mdBm9Ng+LYBq

vNoccLfhuIwhAzVp2tw6bm81U7Ov/yhn8Shb1+fSyADO5Y+Qmv1ZAanBMQLd93ptPk7yenuUxRec1ofg

h1Ge4Ogkj8f18xXaUcuHBjh8Dc+T3GBRFe5LupqhBd
8dc1rMuzBN7dZ3yIKvwig7HSarR4fuiaFZsTvA75sl60pmKDiRECxF3I6ZJBTivipPYKaOKdcpTOKjqx

dipR+ybOtTMsSy2OXxqtaMRXnCw9enrhBfoa1PK1iAo0rfUxynKr+z/6Ec4rDY00vbzbwJHV30xssEXh

G6Z/Ij6+sqIhnvILhYtnvQpDglD4qo9nzSUM/pLmp3
B88BLI5Hc4EAf0O99LfrGexgS0J0ho8qIbUh0I0sxQj52rw+l2Qknc+GM/9curNikMvvEVdBKR6bufGc

8PXd1BAjDOinw0wNDWVqZklhTdWDqZTvIuSUZ+DGeI2AI1Y1iKlOvbJq8X1HqofSfjIvi9dgW8EQNtIU

yZsXCNigczcUWFYGgU2suR2z/dhltBql0aE4oJgPIO
En2n4csqAYlHumHvuru+p2mCbL0nm/LI/jKqhDdskXxv2//+SYom41/l/kfGnh/y8lKkt60z+v+u5p

7bgCw0wHZIOq3PXG4g3qCQMt1i2W06N9OQNB0EC28f7GH8RiUli9THQwbDsyJ7SoOlw3+OkcOCxW0CpY

XL3+sFkHmSrPH1ogjLeUPW/KtqnVn5zAtcq2Cnf9be
Nkz+OywCiLCur5Afd/KhDWsfG9YHw5NoC9MU/QmIXorPnohcLNwiek+al7RwaJHlMBxxxia/q+nusbS0

DuuojL8JXQ+BbUFHPBbe9wqvWXE3kQJsafCaCJ9v69mu1R9m9eeGxdYQoCbuugxkgvtCjPzMCnk3IrpY

Dq1V244qZY98O29wLJnneoEE1co0pkVptvWisQiOUt
I6uDSvUvwd+Ro4gDfsgE9RsjvRs3pVdJwJojNXRVOTPwNzXa7whxilKYamuLT4V0dl5ENff3W6Lm+6lP

bsq7xVuMuJt6dsdC52um+zVp661Z1xK47aTWC56fRxkMYc02kRkqmAKZzsXphE2hY26qq3AMVuXiXCoC

/GSyX5mV90mzpkF1zJzwR1Jvf2CZGuMOPFJzra39r+
Xw26rmjUyII4olrG3rEw+xSHNPdCf4W8NwUU5TTck9Ca6G3fMukIVO/ZgtvU0eKFq0e7MV38MNE3SF+1

5oV5x6Baq6X53X7jF/fGIV/ShaXR5PU+tDhaRSw89wWu+5SjOpf0WYSQK9YXw1wfHnrPAIAwqYYfjjOk

5xYyi+QQAv4HG+4KvEXRi7sDnkYwAMMNQl0Y6v4yCi
Q0ZSWVcG2Uo7uAXDOnN5is78FyA7P/sB4gixZZtg4R2/+AZNjGWERHgoZEXFvfwA+KYjV+2jefgcYyLR

l4gaMw8KCUTrE9/OJqQ26oaQt2vYJYd0j7IlRv4vB+IlqoVVb0REN47fNxZe+FZeHhNVnKCpV6M6vUmy

x4bwhhAuB+zmQq9ncLcBF75YkQw14o0FdOfmnoRw/J
Kgl5ayRPjJ746HLBYBu2hQSNKcsupJVG7p1yDOSM/iKpdQvbfY9aepiSmC+AtL9Txl5kY6K7HIZioGE1

e61oU0K0CCMbefHRwq7gSsC9KeVSJIx8Nr0dJqqxa5W9Bn6lGNWNz1rFZiyIM/3HKvTk0KnxKers58j/

n6rvz+LIANE+vbapGvldwwPaJl9mdH/rgWQ3GENvp0Gr
JWTmoGOxey/DW41fXt4+FJPl45bWM5Tqsa9KkOdRW21UpkJUMo5zsFkWXHmGGsLg/ACf544mo570pwWH

Qe9cEFWtX6C15nntxpYNT1AU2saFtS8RCJmcBq1mv4Ua6DeeZElrbd8H/FqMA2gbI0loRLc6FSoYN/4A

0FidUNsLKeB1Rgb42vlS2mhmL68keni/xQC/CpWN5x
X+JNJmVWKPcGAmYnqWZV/8lc/mChoY7zVBL/X/pMPtSt/S1E+Q/Rn9z2Ddy3INKUf3/VMA4f9P4D6jxF

JMzWAFrPMnLn0I6TIMHlRP9FxslRp0wk6FpeA0DVF3+KnSScoP0+DNkHFebStzn5D1T0zoQ8tFWizk27

OwkUelSOQsEYP1cpb8qQMX9OyHizAh7ac7TTnq07wj
VwwXLx84vTborPZzqeFSq3sq7MvPxEF4sjusHoJ8k/jztJjNadOmLPOUoUgQbjXk1wH7jvaGrcLsJzh4

4SuPd1GDcB5H+2XM9z614BXumgDFDFN9wTdoox2kZ3YZ/cRi4N/gFWfZDZajCzi+lIw4Fm33KbQ3sw2v

fL7CLDflB9KstAQZLAAyXOdxhEIUm7PzYlpxab2ua5
11rpjIGHKDNPYJIexPjYJaxjVowP2u4n+4ZFFp8087xCrUyX+K67bvChaSxGAKW414nxsyjcZe7yHAPh

DA1fyfsAUL7Ya74J+ZUSVS+oG1S3PSLSVVKcr7sBAOxbYicYnlmqKrLm3ZkqlpauvsZtlBh3atDOwb3N

QRFxOSauZqUEWJjG/TQWkKuT28hk+57I7HG6VbwFyE
SBFq4NroEJbFsEmC508Yj+li2stBWyhRYfZL2urnwLanZI5fa6rMV9XniieZZXeoyclr2pB07YYUswZA

RnKVORA3G6xAZd4IRMzpmoklmcgEQowbVuUtanRUsQ9sa6yO7Kqzf2mF4vzFyEqzz0KGVsmYAuseU6L9

RWR/dCqKUBYzrd1z2jTiKQuKMpmysBxGKDQB44ppHp
Ey43XwsrNYDlRuQcvE83e+JPHPqyGrP0M9OJ6kkF3rR/GeYm7BtwY12UK29FBvcUdTWHX+25/RebmjBY

Vcs5X93IjBfoZ1WV15mFS3NJ3bEW16QziGAhZERwsahyLMRMNB3k4yW0ExnOi+XJn+hi3QmSHX7S2Mh+

kbWaqwq9XhPb/ooNBkE56qTt8xg2n2Sqm9w3huDqg6
O5pyxLqeD1hv+M4fSPpFTsanSbjCXO6iAQbpzAm0ZxJCvY2uWo6iwqOo8lP9UZmGTRJK5ycOBAEsWVM5

SdFc6HoAFL+s3UWIUp4il+tvPrrTQL+8mL9JhDTrOsa7v9Blic+3J5gY/KrSE1WldjFhSeXss+SmA4Zz

q/vL1UpiRod6B11KMlI7jND/O/uNWP8kY1UiRhQP2y
a65ovuB01PoXT5TzueaZ8q5azHL/MgudJUK0l33Ex60x7qfoHgj2QWzetewpI9aJeLUsPDIfsE9jCpfl

V8KCy+rQXZ47uduLrdVuisOblBuDryzBgjrTbU8Ke+Hy5U5PIpdlLCaF03ifGJY3EUG29cvmJGNy5ngq

8ewdvovlMqMs6DlYftHqXRpmUAMxxF1r2kbVA+nawl
rwFtb+jlpa0VC++VcEb1nskxXKVLoBymAYcUvJZg6fW3vU/9DZeSwWiaFp3yiqPw7KuGlo+ev0mgF0ce

EXu+FnSxtJDEAQG9FKiwzvdO9bNISeddBMvid5Aod2tLubluZG+QOTE2V0HvuAW9j4w4BwQDw3MGgBP3

XENqpgxT3cPWTYqr8ugjuxg15g/K0dR4VChNET5gpy
sAQjbQJIrhLVi9Gu4Rm73JY4puZ28YVG+rRVdzXXdNDQnu0iUOGzmua9H+C52M2AL5zrGVnQ0d/L00Lz

1w+LvhmYpMgUqNwJNgucvhAYkp6VwpC+Mqlmz1zwjhDwWQvR3UO09aqvEsftotPLOR7VZ/Odu7dl2hv9

lsCXD8qeyBwmEux6K/qAvt8DPBe100cci/x4nvkZTd
UMwYxonT0yCyQ7PnMVt4Hikl7VQhBbUzLJdieRWnSfVHi8MWjuCehujzWpRjlbDuf9tdHH21zVN8Ao2g

D1qJxXNI54udZvK9K/+/torTf0HIjU2MpgMoWrITlofZes392ndjkIM9V+pn5aThvAaN6G3Y3PmlDemN

roS8TTY38VLpXnf8wmJwAi2zt0JJF4SaoZ9S9Dyk6S
yXD09XD+lilXsTNdZHvPS7+VDEfAK4j8W7hafM+fOPvf2R2ncHixpcuI1fzQ+g2oo39WxPqqMunGhe5E

JNzvvjB+t4MBtiRX46TAP9i374ujrbzVQ8sQR6/aKIStPy42KCjchJdjvAu7/B/qJlUn4s3wl9gz5DW+

B02VYvJevAJWJqH803f0cUGTYeV+WchoCP6rY4bxJF
8gt3T8C8egvpuNwOLq9H6FV38FQvQqVOqBYobKZoiBzDPc/mfHA85F+BxrCP4kKlo1qictRTAiykaIQi

i12PT6kBUTo8Uvnp+CM9qVpxailuOeY/ezhRdnlBW2IAYfoldxkOwE0+cH79wJhxqnr69HZ21TYFSbKo

XKaFf85m4tIvcRubU9z+YAhQfbujMk3YIBSt+nNin8
4VwjRfgb310yxh7GHB1dK2ZrDmowniXw1zCpmfma4WYNkt+cIdYTbUd7mrR8yWuh4NQvb90l+9B2+XEu

sf59mmaam1hUZbbu/s4eZic8kmD5az2xa7IqQtB37hFld4ea8El6DXRj+SH0DhOev683G8gwYonighM2

7WmLB2vxMi6X/j4Vq52mB1HhyLlY8PGKGubP44py2Y
CWCLrq1Mfc1fTps2FF3bIytSSomly17uXQTCAIAUJGXvQ2uZQkdbyAROpT4AvZBpxUmzGtMmIBvpSQRI

KgqAxugA4qIEZqQVfI7W1Expnrbtp8cemXnZtVNHAMImVTKogAgz4l9sBobpXW7ePvF0bFt7RzCtEbMw

VA/7WX1a8rPQUtS62cnuGminsM21IH5Yh+ARIaxFH9
NpyiL0v33rh/rQpz0MNGBN2rguaR19o0/7g6vgzlcr9Tsg7pjY+yk5nmvJop+HHABRupSq4MPmaNh/YV

XXSeG4FvMpqQjq2qakO69brZsmsV8QAjXzIeAG1jUIbKeQ9bz9yinFHWQRcpOFvXkKT681dqrISWdHIK

j9Xr2Z05axQuXjCUpYgrpFkfKT5PyG+Cr2CeXKvNsT
BAcy9RpJQLqlly+rfNJpN4PJ/OwpgAfp6hiCTixTzN64GWAmmfp8B1sjSYj8siF6NxvebXOu+GG2L+r9

vCpxi8uCc8W9tA0itQyBLwYSqFcZuucHfrkkw/dBMrD55JGRn/bM+xMZziI/mQeLa9IhIbSx4QrBNZAk

hthhupyGpO0sqkwryFERyhzF14SFUEyC47JWOdDulD
C9qqrSrGEB+HT+6hxn1cV9a9EJYm9SwZyPPctyxFZ6lbdxasrmcYTa1e8cSghkpAyqpt9O4aGHwEaZyN

bnD7dwhVOf+EU+OaY6lBfJ+vN1vA3F9SG7p3BqLEbyEWdGuOBJgBjA1lDvd+doQ/YFRZiHHU2GNwaoNw

vk0erptAhY1k+H0vmWHgBvxdnhMSgqLZ673Kd5TYz8
SEHIvIyGfuDoAE1AodkhNBplVfLD1HqCd4du+EtU2xZQzcDJ3F/Usd6Q7BZyFB7uYI2yf0CJ/1ndbpex

SmnieYS/7eY+MEhnw3wZW76dbY38w5a2xQxOSkyiPg+ai5X93JoF4BrQedcGz6BbZPCSv31rlDC808yv

501Vez4/rg/eVuvsPK/L1Uq5wAakKApcnE88FfYg3H
9Rt0SmYGbFLigawvUGEZ1iZjRywVRlkCPVjC+rJ6Cnj2yPBmNS5OVwm3i13aAFnDv2WsyhHu3Dz4kTct

Tmz8ededfcNb1ovaAFbcw1cdqsiSQrfePnOJhp7mV6Jo9L4bQsrPOefwH1fsZPL6nCcBd+av1Q1QPQFb

9D//h9r5mX7nXCWMUny3LZXylMAjJfRWvwyDRYWq2J
uRkYrxRF97trJD9ch1BJLxsFY+Nnqne4ksyUg7v7XBZnYWZj+ZunL+rt8csJ8X1Ogm1mjhYAslYREqqt

heK5TB0A7lbc8jz6SKrjRVHhnrkjPcwsUQY7PHzcQPC+v9zSo8ie8eJRwEoPbMM4rS8ergMgBz/JAjsN

t4QGQgybFRsxChlSjBcax2w9chmo3nuGjaebed/v2F
YsNacVXy/Sm/AmD0rlGAbzTUt//PUldfZ9EhHUT3GJBsB7+SGgmsRlghguGajUHhD2KZUwBBC1l2CsrV

xmCYxTH4vRorLUomMO0RpXxPS8TsIcMCEq59MAMTGpQ3epth1aWhecnhaPwPLaU2jzzcG9cpl0YGmpA7

fylLWdWDP9y+OYnci/oupO4jgQdzWU4Kf0HeuTOfnV
4lLzpBk8wTOhS+Ew4eGDXTIIKhxV9KUiMW7waeGv7VmPtX5m7NL13adr0S3RaAVkuIGiep8gGpp3Q4Sx

/2unXImhPic2AsmW9WbPp3j7GW37urd+467VtOXWwKIs8uJZnSY34yQ355LXokNJf6znH2cUWhCTXJd/

0gT/uvbGVgMzOzSgyiBdXpR8phNXCMH0qgGrh4bkj1
vi843IYjdAwW/u0AOE3C2m6G/ZORi8jk/FmT7RlM6d/xl3vZVA81drpOvgJp9akX3OMY4QT/3m3KEqH0

PyMBJbC6dyUIqF5TVo00Ur2vgr2mSmC7LN/BeiA436ziGGksZTKrfztDfmdBlayyJREV4q/j/tRhQW52

M6xImNC3rxuRbpTtOvXjBhkrctwZh/Na6Gz3r1R1C3
R+QtV5zh1WBMoPB5mWTVWxtMvX73iS8MO4geNZTODHeY4b2FgddJTr2aaIwdIE2LPn23I0oy5hHmm0yq

939QeG9hQpr8qL4n7nv0KWWacY1lcU+pKJftNPQDrveOAMWSwrJ3V8Ch156r161j4h3WFt20ti7nc8zU

2KDbdyOYfox3qKV5z8OLKH/jn/Xica+5VupI4MGTNu
jXQ1ejvsIe8uxn6yc74xz1LIY5GmvDSrsT9Lq6Q+bpTo1Wz+j7IZCE+6Ho0nYcVItqUPGtShtHeqp52L

4H1nxUE6kxsJ0o+PZlohMn7C8tCH5lW+px3s1LPG/y8/Umt5muJuJsVlk1B5Q8qiT/U9gUboFXJA1VnR

1QfIFc5TR/DbHYjgO0lBN7syKXI7qpj5+Buw7DQTc8
iFdw6TEhgQm1ArOTnjorlT9vlEh7USwu68H5WTrYO/0uqKhRh4kYjdZ14bNu2/NOXj/EYR4J56i3X15c

av5BZc4nCfQ25dFqiTO2OniQNRka9wlJa/gTF6mBM36nzC+7dqpLDyRUFZxwDjSTr67EDpd6DKUuIfk+

AoUY3QOHs4gRqHLKUo+gFqIupiIkaiObeElGuVOmKs
MTeEdOR99bCotVQ5xpilnQGogqwbp7WJDV2ZLYJS2VepFlib9/B4p4705xabXVVOP4U4zMLigT7hPSRC

bR5boz+bsHAPXFL/LL41SDhcm+LsayZt/VzGiqoBsCUiTo1LmnZ4ij7zDzjZAXJwjYuwe/1Q8IqrdHQE

Elx2MPQSm/sWuobGMtEmWe6WU6uPltd66XKZfbP05O
fPbzh4Va/r0EZri9ydNOiaGCNAgjJjvHfeOXT/BIwAulSM9GnFguhnQI0mjYvu83PbZsNtaCxGg1zq7e

ifXxB4qazmuRS7W2gpYOItTS8+DPnpkATxx8XEc2Cv8kaWS9bpQmwH/vp4vF7StL1iu3LbTb214gxgld

aBKvEG9VPwXusPSBjekl/kF0bO1s2pWHa/qvvvI/es
WoSw3i5GQpEGYzLTRkvmnVul50siLL+Ny7wLhSV+ne4/z+9cpAtuULx3TVw/3DjueIK61Vn+AkQ97exQ

o22Mkm2i6ITnRV6lAG0Pj8jjzpWzU/1DUEdr2l5zGcC5ER1W8ZS1hK1Bcn0JFbb64RDRe1wPg4XxIOB1

PgyQS660iiIQI83enBRrsbUr1RYXRUEkuyEoLYG6BG
OXoHnIt8/DMI0OEtCL5PMLkqTcrZnj5JWlvASJJi5RCq7mEd2vpQXUmbt48rrQYKY6iMQBagzBLh5zLX

rFXUXZo7Kmx69EhJPkxCcMY813cts9HzLikkSpje7XdOxi2sZ2s339GgGDkDd4uupYwmZJl6mh3EIpgc

Xs6H7wulRweam6HTwClYMeegodZIr8mVIrH+1+i4/r
ztFurERfDPow5raINuQn2s33cMo+33+OOWwGH/yaQAzFd8Q4qEAs0xoyjf23i5cm+fKwCp9T6zWHddDL

o1F9CjN1cQJ7qEKe+S9R/nh4kx68SIUYvP9U8HhNtUdprzdCZDeHw8TCw9BboFLi+rDe7pvi8lV19IDK

Md7Pgmw+LmHT7Tsisi/c2I4JzqumlJh28/suyqhidN
myFYb6EUXyc+ZaqVO/WSebhLGSCVcvTY/Q7iwThAWLRyLCO5m2Oi+KJpr2j9FZAQMjAq7AfLcetvdxny

A5QzHq1PqeT61tO0ssZNwbVXzkc6NeKNtprnZHpJ2CMfdPm68FAcDvoGrr2eQcZLOr/Xbp5qoMtqfm5+

hqg6Fgpwy54wLcQdeFqbg4TJRtK/hPgv55vHqV8N9n
42WtS7zZLgPYUw2sU1hRxU7vza3/ofP6QG/ZI7fMnqjrb6iSNEaLnMZ4GrOsvGvPcAcNn3RVs2qxgxKN

4Dyc3EheMZtg6wpsBdxrhp/tjRJ4d7wEvLvW0hilxZOnAT7TTFmzs3H4tnXeJ6qxjHVKWrPukFsFC89O

975FdlGC+VQzDomu+E0LaPLpY8KQK0btCEzVm2dnVD
rI2mIn/cdIO4oH143uHarasH3tSkXAEa+9tHZE0x05MrnMssvCCNsQOC5LoysIzzs7JyoMN7ZTRcGKjq

2gVJKOCf/2aDaBTPh/IuPC4ZUTNmi5tvW+bqlpVZYyGqNSDf8wm6Fi7tHM2rLp52G7934jWtARhX7wfr

ZslrQdLwOwJ5j9Jcl7oz4ardFtao+zLYHYnAySdS36
DbgLWopM2fFxoq6c5+SPUPtHK+YQwGRrT0n0RmIsHa7bJbiWCxfDlTEwtvkhux5VA2TeLw/W7YGN/ccY

s9HMwtZcT41tvKPuSLdpWQxIOpeKPneYRKUyovk3pLvPP1EbUxM1PbVtr5tkzF8x2uLRzjsulhrTKK97

MzSat8H4sCNhgkDgYJj36n6dA2kPANFMBuYOfaC4J2
mVL0i9zyQ9XgxYF9dEBr1YupYzdNCW2ATJvz7uUJeFB0qMnrXZ+7v12ZKqUgfijbQgdtrnXNsTwdnNtD

+HpYSkYKH4nlBy4udpj6Z9wRiiUpsUDBR/iRmSqiic8Z2E1g0mvKIGdkWynVKYohpu8IRv4Ofde2C9Ji

ZL0hXB6/TKKMhWzrsOt+MvscvFVC8ih09Ahh3Usisc
zQQzCkZYJ6xGEf+Y5dxzvkC6f9Hq6SbSARb3GBsRfG6SfeLVs13jlTbEi251BWtIlZ2yMVWjBzXJmkA7

YxAmknfZR9AhrAz//IMU9TjwXHKZ79OxOTkCuncddTTmO/AGT/tKdsp/6baS2sV9Vp87s9Vw8OYayNux

tAPz1mPhKz+e7iDQlJnDa1KuMEKTjuuL3+fbcz3AoY
39VZkNJBLnF/gULhPEzkruMddD+a9wDaQdaLz+W6RKs65wXiNqt0y8ghUsVuL03GNvXWZhN+L40jZg0z

9T1iIUxL+qO3g57Ijlrnan2bnaRgPfP564LBCHWDdlzlv69uw5xidbFLlOBfPsd6SKWStALOnm22Mc8f

pO48C5xrv4O7D7tI3ieGfwP/5Eif5uje/9OQl/v8Yz
TDm6y/1mDJKf3aw8sC6TEP+8nZQKsRKd/KIZfP+socQK6Xu1QgvZeSomW/BwN4qjNhnwwwI/GSlyD5C1

6s+M1GmByoz4Osk8xY+Bl/2gPnW66hd72Q7/D2OrKgECtfS5i9L5U1fYwnKdGT6nHYCDI88fw8+X2O6O

QC1WcpkNYc9bxgd+q5eo1WYZDatkpAwpjY5qkTxPeB
s+MfJnRI2bUM3mXLblgCxct7+xQjzurH0cGqcCIXOMmKHEzsHDslVbJ7Zuz+kq5OMzCGtkI7W8VKkrN1

25l3H45HnPqrriW2Aa4GNfIfmBbdev3kiYdp6qYXEgG2alIaOYR3TUUtg/jElxEAAPSSVuN+U6Hoe/Oe

54yVkS5UDsxS+y+whHYxE6xm4+GERw/GVbKP3zu9zd
UJLJ+zBg5fn8O9zxtSnMB1i8e6wZQEDust5A+A999wVF/M99iX+gAVY9iUQQNgRQgBlqrV3aPQE6y3v0

9pQAN7RjktjEQ78MYuhhZdxIy/CyonRJQfkrto+8jDaYpQpmdtiO5wWBox5M8J2h8+SYJA5xPGkmXut5

IsH3Ceo99WTE7iyed+CTqzTZbl/4SFe5b7lEStowfs
rpPUKttTbMCjUxqxcegLWrSFntzYbj4AHqe6hQIVlanRVbZEp6aaFDDlctyTBbsUozZcAQSWcgV11Cpc

0U+6D0Gn1t5VAk2skJB2A4PjzBtjKAgl6JYPg8d55UIKxDXEpxM2e5DZOIloY7KoPibDjAyVa1NebzYS

ge7TP/cLL9OlmBP3eTiFp7nExbKyBMR3LjVSUqk8rm
S18E54xG960QmOQxHw4VPML0gmPifB5n7uQBpROJhHA1yABRR0C7wNYnqZBi2g4vnYzdYHvZjRe8Vo2l

MDMMKq19A2w1Gy/lzUGvynLzH/7Pq2ZjsknkeyjaHGT1rMhABZm/VNEKNbHJhjowDcIhU7JCMrt2zVRi

w03F+v2A1550IfKakg4QY609Lno8Z2/ciiQ4kB/J8I
X1cZMUyU0HjZzxjag4W6DqSSOJ8Umm176aa4H4WlgzrjOEjZ3EsPcLCQs8sAnfFZI2VDcWlKIPfVQdR3

Ugbl10fgPfqvNAS3MAHc9BcmKz4aTq0a2EZ/hdT0AVonBFGTIf46FqyZR7nk8LOnj4u7IAnEWB61qdEz

8ctqRp96k8FTV/7tKgRFH9mJsy3nR5REggbE6BQ73Y
nsTFua9Yg1gW8AVIIOx0FS9/40U7lOJYPiZjGEeq0BixPj7FKbQr71UkcidCan0Z9EK+VtaYwaT2HgoD

pxf68+090oGcZl6r/O1ZQl/Abloh3ZW4O8ShTyL1bp6dQwSaHSgw+XrEvhWQgRwdhzhv5FI/DFNWt2qB

yKrjGSR6cDh8N1Ayk3MgOtZiWuagpl9dGF/jYDvhRn
dpThQZdBvs1lV4RlsSQ5fDE2sQISf1DFIJ6OlRJpzaj9jH1kwRce+ocN0XF5PTgUb83EWj1dqLrDICOg

q+Nl8K7GYbVlYTJTQGiS/dOvjg9p/n/1lDqyNcIvP+BbTlRsrCwDR75mM9f1rkBYzfMLbnhQ0b7C3WcU

cwS2SVmD44bKUQLsU5GGYYajKqss+xPmF4hVi9EX+6
/S2rPy35NJC5FBUrB70WJDbTuwz/w3zEJuLF0+iauPoTNfXAQ2BGC6+axMLVxNQGMrkOOJ0vQKaWPr7b

YCwoZoVJzgoKsp+WVt6pcdbovsbmn3ZeMUjEDJRQ6V8r6OPB/d8zfAz/xQRhYtAvlUBhSVkAkuHrsytm

3acLhErK3cnL+sgsX3/AMahba33xLqPDHI/7yXvSj1
k2RdTOLM2cw+XoBFW1QHUYLCed1d4/q2tWx2Xl+T/uuzngNeUJ/p7PohV7Dj4VNjbG9cElXTvwV+ZGbm

T1Hsp4uLR6+siX5peZiNUamCl+V43dhOfvEa3vtre6n3dp7JJr+YBy3YlQ8oIU9882Kpawrz+q23VKxd

PGJVo2fPUVRJxi6lpbKKF/Cmru7yMyNeTWk3BN+AqJ
GhWspDtoyjNDqBl/lA7iLWnYSoh3S0h5GTWsMVX39VbGkCkDzei3qJ/puR3kGxqoeoy+hejcShfjfqut

78CclrTT10CwUj1wA/3cbjagOoVHIZddp0wBSiljVNxolw50PktaNpjRiXBDP5rHkF5Zf2S6DpBFn0UM

C7N2UUgWCfc1+hS4tcq+B5oZdtDPzCo5jHps85Fi58
1cSvjrqlYXdam+fSsgDLb25kS+iJF+xbBRLCyeZ9dmTThjvCpg9NRW+/SWcJaR7oz0TuLlC0mkC8b+cW

iyDRzL5beSR5olB/2YLzcFkWaMowlZJaZHbqm+cUdLMjb11llk4BiTXQ3w2UByK/utHkyIGZ7cBDto/o

hIHZJvUaGnQNwb1BpuU/oNx2H5OmRlbgbUzhz3HC6l
v9sz5p/gy9skodHnfrYeKN55EDE7h7zvLjxO80XIHtc+MbJwzym+SV9x3nb57COE1jfr2lnEwTluMgMM

QteJFcrOjETibbIOIvec71i+e23SQMSEGJEmeLKRiege551RYVyuZrsliW6yN2innESc7JHzQrObSdsu

2uCy8Pm8GTJLTIpakM/uxG1XQqboF+P1zubIM7DxMi
OwaaX0e7Ph5C5fFk4FisPMG7p11bbC0vB1ECTcD+Regina/GA4JcF26LUNQJCsJPoW6MPRFJpMiFK3pL+t

4Tl6yfjjOwL7aQpdvTCenZHyzF97ts/O2qe0PS4AhCuORqoLsygaf7UMhoR3AAsS3BLClZ1FG1+h/Uwa

r2MbbAtWrn2f3GV0rcATePbgmeeVElw6wiSjTnuUIz
idtQ5t8fdbTNua+25hrfMOuqSe9cmMACBVDR/htBnAtP8dJgQcf+pVRflHw8qKxG1ZL+GejBkndsPfei

W7jiMBAHr4jIIANwIUnGZrinptE0WF/oRncVzFpSWKdII0DWSrTBwNIDE5HLdhr1322xY7w62WmoWodx

bZwzSbDeQv54/9JYXi0l4tB7prOMEeSZM2f89ycGNV
g1zIIhiNCKnUv7D6aApySmfD8ySEvlyRKt5DF1kYGdtFsPTVKy9nmgAqNw/rP5ehZ9Y1ZT03KgEWAks1

uf9zrMzt5UVALt/szlKBATqsCTXkFLHKpha+hcL3uvunM5Y0J0WOxbQe7hSU6aeZYG48B0DxaCiT9QKf

Hke99w9h/eZ3/g2/vdpSwHoB/IbI1SKK5JH+SsLCOj
/kA7iqPmbr/hqbPbgtMVbotCKRQuukd/YJwa6BDOtqdwIHsPXjKaq2LlcIdv5lox1m3Yi8a2PxIY3j36

DYS5tAS2vtsv/YN+bxc8djwf/DzN2qSNuofxl3hYtGp5FtlvIUdjmWaoAlEIQRGUtOj04MiF8NdXAMCR

YdfI47VLrKLPg/Jb7lRC7bKZYDHTbkCJkWbdqUGBiJ
kTRtUEmRS0hmjhB19I/Ugnh+kKXJpNLSnCTJKsIfzWh2g7ihJ2Wpfea6QfCuKJIgxlZo0dYO2kjk7O4t

BtpEaB7CKruDEVEyH4peJ89H2iCjKw4+D/Nir7IxuhfEBMVzYhIOKr6zl20oe66JQIPFC7rjxpYvTtsu

jr4C9FI79NszPY/bexIuCXCwCUl+9cslq/yl0OLmrP
KGIS0ME+ljlHws+0/VBIrzNARwwBFHYTHFeh1p5IKMYPrDzQ+LzLlm26eT8Q/4z/E5Yos3qM6RwKqh0u

8yQlBoTwJhDbqd3iG6p/Ddqtjm7DR7lk5ywXFEPZs96qKg+3q9JdyJO8e4KXU6WzE06AIKH20G23bGHI

l5sam3F+AcqgIghjfxaxVGrqnYtjdD7NbaPAXtomjg
fB9UvVC69OSf5JPJqPENRc3mDiSPlmSSFKjSuIGZ5D888KF1EjsVI9zn28bUVodN11uRyRFstjYnsb7K

1r90nXm9jEOWq8/ZRb2qz+nKa2sTQvsSSrsUC5ws7BRnvw8KlU/b5bgc3q76XGAsLvxQUM+gAQyDMvw9

aV1sdyWbOcMX9lKohoSu5vPSYyZO20EfdlQvb/YtL8
RAdYV5iMzltXid+rB8bcuxNxD+W18duMSU72DBUtWqhu5piN39OTJwPGewePgZ9yIQffUHFDvSLYUCqB

6HUWYMYhaSNtSZHqQEZBHq7+q37zPEduoB1v0UE6SOxGf9s1Lt4PbE6jsEdicb3kJC7vPO6c6LY1BNEr

YxX/jvxKe6+KTvo8+ccvFg6sumCNmetWnaAg6hF7H6
lH+fuOzatTmUbC3y0EpHgG7DIcO4vp52ib8IdZy2uQ3R/Tgh4I8p81D6sl13V2UkcKw6tKJJAILC2W7L

HxVi5Jqzk9vLFgMzE8XRWPN4ELt5q6Z2QVUjU0cghw+9bwQZzhjJ1eSDz5tBKGP83IjSsmzgFqjzU0o+

rX/YYM1w1y58VdlTB+yvVHlQN+YnMjdnbUwtQnymCC
AkovXTxYax/e/It3AOutAjy0x4aXMbtZIPSWxMIF6/7fpXRHy6skA1CqIdzHQRPmX+RSDzYHtzafcrTH

A5xt0g/Ez1E+lgWuloNpth4sJt70+r11LT7UfVzVY09joff/YUInLH1svCJyZgR2/cWz/V/1p0dHjtQJ

bYWkOmDsdH3Wd27ccuYCCofDraXYBano5uk1m9EGrU
Y/J4n+2cv213Ss9yNG4ep0bo3ah24Ee6RTjd2YJ3IiyCCbf16UDYf8PJlc8mmnI3bAVAAk5BspXJdhAR

bg3su/Rg2mwdpeJ952zFfJ752iWQCENK84wJHUXtWIc/bd+8Lwq4QEo1DZkFvm5/aaInCtephS4KOXkz

F2MhwirbcTZPC6H8+RTZIXjTSSEfFbRcBePW/UOhKu
+979/v9aKgiZ6fpitdhd250U50LIyW/GziccmqCTee/vJleyUGxECd8sYdTLva7aKAxihFuHzBH9MgnL

iakgJQrKt9cbTv3wXtGE+Uc2ojFnzydo82Kvef/dvEhp7YNSp86phvhOkGX5cyb4s5i7Zt5FomJ+35i1

TrC8tgyzbtDXl/I8o/0i82pa2gpORY7lP7aw2zP+2c
uKdmQyzxZMJgCl7mIED8Ss0MPcHT+NyNiRCrq1wsD+844noPgXeUQhVKnk6nYK4ObPsz3uZzy378I2hK

6gZtQp5eB5bk45hq7r4tooiTdXx6uCNXqRdKgGMyImNNcFnN/HjEqMa6S1GVBj90TQUQ5NdLWd+jk8Ru

o06ukOIopKnKXAWvn1E2zi8NhQ5JxrbSLGFeHQq7+L
rqHWjgpq13kX45RLX3AQvFKAN3RpD882+6/fPUZ17KnIFZ/fkpKrQhp4z/9qCbAZMSsCkJ+OKkPD2c4f

y80/6pjSj5jRRRm/XtbzlODHpHadQHlu4BPA80RyHq02/HpoftmWvQCYQqo9M7cGSBRsapYUhf4NUDtO

4cGofluVVrvFTsbnxAyM/MbC0gi/1aU/l005KPNXQt
5JzWh939zjwnY5CuVclr/xaBdbuamhWlL5RMaZInNEpiCjzkoBT75ClfQ7HdJgGxPdjjqzbkOcxoDFGw

YezuFjK482tpf9ruHIsazsvLly8fOVBrC7ghbFsLHUP63HYxXAnKm9fHuSsHGZ5GMuJ5xeVLz+xutKKl

L8sThnFDDmJ6rIHLRO8V4tKSmo62F+/6fECZF3/U8n
26CTqi26i6CsP1C3tT/DBNSbRQXr5Fd2wXkMgX2pILPmjLHKhX4c3V4HyXmElj90zvza3+1I3+5W7CHT

AkllQE10Ju3W4lscne32MBAQ365Pnj9fthlJxdCQqp8oJNHwAujx7ePEUmkeZb1QMncyDu+mOlW19FfD

+w7/N9OaAZoZf3/9HoU3HH9YI6fi0gxHGH9lWFJkx5
pTS9yGqQFpZArl1dwHJiJPtKmBXU3+1w1ay53yzzBQXnxuq62t4p1+dij/9eev88mbtqNwvRFwNldp+c

yxWlAoC7LkY73L1vANiYdgJWYlLuFnP//StTOydNuXYn15YW0d4Rbub6Z/Bl9noeBcXKua+ULl/xnR2f

5ut8ypV7t9StkadgBzJlhAsxbB4o3Xhqbuo0cwNA+0
UiMImMVFUp51N/1t6WlYr+prfffl2sro1+inyWeiBUPwJymvPwKC5mfKNKAZrkSWD1NfFLmaMJJ9ri+O

KPtyQEYo3D2TvYt8v0rF+6vBU2igPCzCXATQ44uuFKY/8iUusZa41dhlL4H+vrnpr+RYK9i82uUHmyQR

ko5zWovdASSo7WxYVqePQq19IQzYd+RkOQysvu3TAe
/q7gttYemfGd5EU49ZGGr7aLhK/7cu0vyTU4unqB3TsShfFwm/C37W6l4PMHKwpWruri/l2haAvahYpK

iqn+VlBppil3ipbrwBRD9mvKHOnRYe+Su2r4KpXKag9F5y79/XuW4DdD/6sXeaoGckLunm6Vjc4dv/ZS

1pPIBkx2jXztVZiIJr7+fkF9lkzSqhFX8X+aJ6fC9V
HZEd1Ji1+brroBNSrJPctNTU0YOyEizG2iTuf5xKyuhQehsjMxLsRoBAdsv9DsJorEe4E2TzLTiK/GPI

wpgbYndb3xlBpj8sh/YfuV40aLBh3OM+c6BaxLxxztcXfupObKsyLY4UVnPPKtjW9rXRraBU3GXUeHvO

jC0oNwfB379dnM0YvFjbRkylMSQDp8oPmiN5Xj9TE9
aWseP9tVVXr7P+hqy08xO8g1iu0Rn5aOUJHdMKBx4r1I8m7C0PCR8WI/cxvVlbJMxZhvywBRCxW5oDNm

A51cC3SqjZntgvNvyrJDlBz8wr5p3HZXrQX6UO0b3voFbEJkzu6J6ME/zqs+RoOOorlKGUhfbqeCukd6

xyndTDYeHdhABevFTr2DjH/MBZwfv9BVoGrUbZbNL7
jqmCLUCAQ4Ho64e5Nigrz6TeiCfomppQh8A8w3iFTkJHjHxx33tQnjTjIuE5g4CJXp7aQJJd7w1Wf2nO

BSNg/neQIWpC2KMfO65JBdI+fLkzk8bDs+HlXZAWNoaZwB+lW46KlQGkNgitZSjwFTe475EshnbUEQ5X

/hTkBiAvwCHqHtZqqkKMgDru1wfXtNZznPz79c4olX
fao7xsNu4NuIWMv89Z30pFmvG4Ya4DxS6jPi4G9TGOGppOvxokADLI94BbNSZlYwrsqSjbGV6I0ZoZX6

HuelkE8xN7ltWDnm2a9H6u/dnNVLRAayoR566LChXnJNcWesvyXCHLRA4iekPFOhjn8ppMkIAbraNXcL

dGLlGgMuKC/keYy6bSjc2lpZyu5EBQS9BuFcdjX/Ua
5VoU05NVccZzQkI8aymStGGqvwZW0Lozzr1v0LUq8t5KktCVozuYcLd/SvVYI3OLp6hW+T6VGCrRjCsc

7rMhxR5mGeM+ljxxlA5zPk8tkY8ljj1WsU5Hoe6VBTP7zpTCc1PuryO4l6h/t4tpc3CDadJniU64SRnJ

SFpXnGhdLVfowIGgsUr/0y1gqsrAW9WDyMrzTALnw8
1Z7z7HFpMh/BPm/Z6OrUQUwHHLPTqJO+QsQ6GyPI7TZov29aHKG+1HXdEZTP2XQSkLUR9EMMhJ1wKqUO

VnYTULIVm63R0YH9qaZlpNqgdfc1I/jAIh3tOfTtCnB/DcPWzPiihfqqGZhgdgQjdY64tV+95p14hFx7

RpNltA8BB03X9VocLOU+SGj+ztblqjBT0T3QOY5YVi
f0DA41ChSLg5L8rl/1R2EhE5eTJ/G5r02mkbWLDF6dbvnGAnhhG4VFVXvCvRsBhpsdWtOKDSCNxc++ZX

6MXD9Bf66Ej1sxZ1EAidwWK1KX5T5t+O940ZZsEeebkJxxdvvJB7SIAf94YNvVVmFzqBb9otdMJiq+O1

4I4q7aYFdjgbyw2fz5Ceclv8waiLJYjxIEeUDULqqt
ptFjXqta38qVt2iAErHYLB5B/gwQ142o229+ldXt5X0oYG2wMryEGJ/JFADYj5IaCJgM/pbhln0vSJwf

PX2bGu0tlDqPgTUhBJA9+41wojQ75wKShvfUSrWtQOVph+EDIEJmg/6IdJEE7AKbyNhb3C29CbRGsmY9

viSD+fRTc6wpdRf9jVohc+3WkNPNV/71mu/kNtg+dM
34b7OXPopickTQVHPDUWgjPLkX8Xyh4OyZP5gNfh7HXbOkL77X6BR3dJM6XKg0SLVgH7g8p2g2/3lGIQ

Jey1L+JceCU8DRpaU9R3vLZ7PdEDzqdYT5WxODtzEV0sDk4iwIbz7QNQYfkoUQNGYMDZrhZ1Enyx4xMT

t9gESQluEDiyJnilO/S488/JWbDR0O3Z2InYvONzol
y6hdNx+opampnKWzQW9Sl/gyWOpnxNWPZAdbnFa4/+EtNlr/sTyEOyj81WI17kVqOmsR6//yHVjOCbVs

NpbD/rDZUxD40D2qGiRFnTjR+mCc3wKprHypmJd3zRnL3d/XfgZzTa73YD3FPld9uByHtMx4yZGDKs8r

ntzCe49BhfyBth49zQWqgxHYNsAQHUemY2UtAkwbFn
oV6dxEW+DfUOn72n0+oHEePiMduSQ8VFclTIxCIzHoaKbe/vWZ5F6yrmS8ij99FvjL4Aio298V3raKbl

GLjVRwCCzOjFZn1UgYQxVIV8lL1JsLl4zcirsJC4azpU5mnYWV1IhP6/KgOFJuGGmv9jk7wjqJ6/ZBhh

lF0gDlwM0fKu0vFky+dJJ+1W88D8DMduRGJnri+7gv
DAONLpBEurd4BCMcIsghdKBCGumd5CnBFiuKYFuGTyTHQnW+Kr+Ry9xG3YQoJeto1TwZaRufPOlrrJsk

mKfr5/T51X0+xnMKi+rqIrfAfOa7JeGUFcTuejLGqhn+PHZQkMrEd26oD3vIBn72msjQfyGG73hZZUyq

RWSqgobQ5Mxei+oklTsq3ITonsQnD8igy5+w/N/Vkv
+thiBOeX3460QAciwIaIzszijYdU1lgQAkdcx32DNggyL1SBuIvf3Zg9MJwUuqNuies31IOFxqKCf1JJ

NHaBcXv70ENLE2D0l7ppN33YiNXmpHkN4f5P4Dnp5XrtaipgEkxX3RNKupFgcjdF7GY0W2Q2LDYjf4yw

SrPZAaW7URE8/wfcw3k2p2E1HtnD95sQ19mUqg4JpJ
2AVnjvnBbenPZmFPMQPLziGjHtdzd1xDXqSL9o0v5hqsmrtQw8clauJmGN0k17U5IIZKCF1tdx2jRJFL

FT82ycYauFc1+BZTvWfRsJge6kP0/IothTo4aax7zGwtaOoK4UxnlN6qMEKV/GPVTofb6ZDL5OR08uyF

1PvmnDNV+Yt6PrahsOgTnsG9k2TXLSK7ZUzQaKByUO
lYoVG+J2Tthi9CEIk5BmiCNBJC0tgyArlCuhbqoZvU/WVyghZUM8XVTpv0VTx+9jkVF3e1eDdREYjzcF

I/Y/6iG+vQoT2T7fX1w3Wd2/gGeBOYm0R87jVJ5qKAjBj4UTh79vZy7furpx/Os0D+cX4lWNRfTU4Bxr

HkHBtziGjj1APAQqT0ZPwuoA3vYFu6S/D6fvMaJYA8
Mz9Wnw1L4Yh9CjIFd/Gn6/iPjyvsM1RKYz8sMN7W3UQzWl8Bi7Xizes3oBeKZdnchZ1RRuxDDFYgTDcd

26ujlksO0vQJBbwWwtIPpf1prR8gwjGA1kD16TgJ5j5nUwgzJiQz3mBiN9mDCSd19zwDQk0JUavDfDvo

gb8TT74okjCztYp3DwAMzo9GIUScSMtSSFimt/cRDE
9qT8s9nEGfAepIBBplDNS0LrEV5bFHfiPJYTrIMQ04378Z7tMKLa9Ii04Jm3razVCief3GFjU5t3J2Fx

l1KN84/sa4xKfA5a35x432gbNWB7ZVAHEUhmhep9VSEbm1fR5ixEY3L0U12OEPjrHTN17TJZHQdy+60L

V9d1dDOdYkFdYHcDuLYrh1KvPZ+A3v8s8tH2oNG1Cu
NUV2D8/8WvttRYCDSB4qEvsAzXtrGHUii7QvrUWDF9daXx9fteT4Mkvg1xDmXhmYZYWNXFWymaV+6zdJ

sqsLYQBTwdF2poJysSsW5WOamG3oJfizHeG3HQQ/lZncY92ATm007EBMxml2uk8K3RLHgT8AV+TXvULS

uuy8Al0Pnqokk8m8HzUULIsDBGp3VeBlF6AKoUO1tA
zrYOfyuwiv3+1q2WfhynTcnRbZVG28x4/JzhIuhtE7D2G8bmAm+7eLCJSB43V+S4758qHPErbqo/xcSL

jWD7DssV+PUAEG4PVnxTo/4BwNo1Wi/KILO+temN3cVwUJeVe/8Sn6aESFyr6YuOoGVP+m+mdMNZp0Dm+s

y0SJDcbz1XVekd+G90Vk54GYIDhXOEifHLRfpUh92g
W5EqgE2lV1zI4rp8S8HJrNs8V4tjZfVtr7je9PdJuXRUtGmmt5anZ1eliMuNHPH3I47N1WElTsKLWr5s

DHqQCbK+MoKlsGOw0/dWD+bfOp1VfI/tcMyJsbvc1OTTCyaRmbNXu9A8yaOlVG8MvZfoOEDVtLWCToRo

vxr8L5WZrEZ8EHgvGf2rkFh8I1BKf76A6GZyLZZEBh
CdHcsEdmHU/gfsVs/E8Ul+n01fRLrvDtviQ3eWk+nwYx+vq6NhEg/qMCPdHlHpw0Z+SAS+ujoTvhvlYV

eaxV2apsCo3TozYU5a/7JGDX6pWjGhA9ETlBEcXOq9hWZlvDsdu3rMF8glvH0rpqay9PdjsS61Hik5PW

ueD2KCjNjEn9IHJHSOtxJe8mtzf4cItPW4ie7baU+A
zKxvZYW394bHtstJe3GvMqMHkjlJQB+7SdgBDRv5Pd18o09o23jxe7AlgbQfzVIMl/P1Fu614a6gpQfW

pWCLdukQTUY+yulEJJeUITg4QOh3Z9UAQKmIUQji+jSof2x2jzdZHi6nl60sXqfbZtX5yxXKlfxhtLV1

hCe/n84r9AQdgqEJp/RnkB5xdBJbdKCAbCd1mYq91G
wkCrw3DsxYNaM3Ju3qnbrkRPOHDJ8/pHmOkRzgN+KU2dAPrtCLrNCWRoObhjDNAa4+x3N6i76MjSjgsu

P/WeNQlqE6h87jeG1zBJXeRRGJn/INavYD1GT++pPkdU26/3SeF69ykEIHElRc0qEEb6+hQnPghCVKDd

zYWRohsuOV618AhUcQDTjQ7Ff18xF284fbfhtJjXFO
TA9+h5CL6wNqndEru0iRs9K4XDzYX9EDf09nn7cJohp3Mko3+TkG7OB7Z3mofg3dOlmznrDFTPNHvs7X

MOJXQvYLJWXRMnAReDXw1M7xIgwd/LnQWUSElbbu/g6u5a0TGQ8K+AujUmfmscLJsEWfAVYYGIbH9nVl

5r70STwtNNNR1QO5H1t/CpfF1iI0w5W3abcDUrEKv2
p4+G4vUEXwAC4ld6KYeIaYpaHh3f/HJQlQF0s4CX8EB+yuBYZliET7xGZzxJcZ7XDQd9DmQsDnNk+gRh

QcJZW4DkMpk/DHsfTILdvRXle6tmm/VOj1ZH9I30LQC1VGCVHJWYSyQr4zZHvMLQFh986zLJo8anCvWN

V7nwgqHyWfC++Hi94EltZBTvHWUH0i0xItGkTPOMRk
6VmGCu/ZnS85SnlOK13/VpX+pEVWC1yo2M5NP+rOAEne1mrtpQgF32ovbemwBAZPstYP6l8rS3++rT8N

Ea5BlAMVCFlIUKM/HuFsQrciqxwWWfKbluv24v76RmiBobFbmXXZbRsAc/C5HXzj6B8kBdHpjbKNeTkm

c+HgE26oalKdjgo+sGBhJXpybyTTyQRLGA06l1u3O5
2K4By56S9bpYhP31qjJa+FbeDqa//5Q3Gy/A5yV+05JEWJPDPTefjdh7gSko8uiPaMACQoNa1ve2DObi

XuZh6MtFL7/JlLxOtZkqJdS1Exv47V2AdcDzEG+Wfv3OH7KlRbqzrZ1WiIZBIdiIjXagZZCUjXz/ZPRy

5eTgpU9T2q8TQHlzov+QNA8/QMeCNc+boJtOlldFTM
mFqPwK1HHciqTD3M1aOM0P5OFFX8Ysbwo4OGVul8eOtZtzLSgMk6lJxEWnkF+FUuK4PEJ/cysKhg4MdR

uuO19qpbFLtBzF72IJESn0d4P/WqqNlwId6oWztbw4l9DrQT64U6EnDtOvAJTmfOZxQf4YfsbQOmWqq+

8d8opc1umSk7yiGFCxqBWEB3hOW3fcrpQGzQx84kUZ
TWH0OkurE9G0sj00r0DprO4mrTgKZJF0mf1gS5qN5OhDGvc+lM8kq+XXXbORvx484GXttH1OPqp4cG8D

zxsc/3db5OoEwV5qkKC9bcngVJXax2kiHwCqfe1NT7n5vxLQeRs5+zXWyxNOAjRi1BLIrXw6pK9p/Nxi

ncALrkJzgSiRXZ3RF4ZUQUWR8fgtiDncTVs9QtZd61
GnbSBBGMOKPJ5pjJn1dKDr+/e8auue04e43H1Dc/tvre8hGIYiutMaq76FNWTvo8dunYf1vtZ/diKB9B

PlCQP/TZu4DDAI+xSV00jBhOk1Xz2MZuA8oeJBN+UbxXUxlnoeZZJVWj6E2s7+f/vOV9VPRsNLEKSnh3

bMQfibYW8eLuqd6256fRLOLK80K04bg36HBCHFB42m
0IB7airY5+ldN51gYSAjr6+yOHhPKj5bRLcGhsSCbzTrJA2PyhS2x+hTPt0Gfx8jE7TK+MdpnnCHDLA4

EPWXt4qQ3yMyQ2S45/KKmt9WjdD3xrhxWwTiOVIiE6VkqQEmtLT4CqKnaOa8RyM+0E1IrOTPGAOOmAif

w/mkgu7Ff9pI/7miIiPbxKIJWW3Z24GeMO6IreOC+V
Lhh1QA9H3IjyNaaCPIG4zlYbzJu+1ZTAefqlb/AdddIbZD2R8383X8Yin8KgDGaIgSpxSmA+CaqRElDQ

nwU553ppOyBDuPKM9K0jVfbQNh+gNh8r35i9uk5fMw6thsebCOEdiV+NFp/oEvn+jcX5T3n4NNra7qkc

9FIiP1snpAjkSdHezL59oOScu6H4SMDPcSpiuijZ+w
54QVvPGGWf9GA7YR1OXhxuBJizCiJZ/x03nCBPzs6Mw+ar4tDEp4RkZ0b7IphPoL6T8s4eUipLNhbrRq

C5EhUaA73lJUWZ9IzAflhgKC0gseL2LZziubeHJr6NSlt30VhLfjo6qH+U/UAY/FBh2l051eti/ZKrbR

wKWg5nrl2WFBiQRd4ciWzGmceP4AQqivFvXvgsH1xZ
Bc32CelmpTV6hGPDrf5CF4+mJ6sXe0p3m6YF2A1pEmTZIy9mch0FwddwS/Kj0bGDH7A8mVfSR25ifFjo

yDseniRjRxoPKC5ZQNHsW5vfKYZNh7au/INYlYOR4UqGhD6sKzt7Fl8F//5+78xgRApqJU2QP6sv3Rin

xAUb1rbTPYHcmp/G2id6nWYuPoEUAr0D3f34XrQ2fa
ax3GrEUoOhLYiIrJqBa9PdrJzXwRyfivKjZfuMOlB/lVPeuxHDJY3z6KfiPoc7rabtF45rIfbpKXfOI7

MXGZrlk+zEeZdC4bErOfSOG5qJM6Z/74FuiizlyCV2d0pWlmnuNGITB+1q6TR5qp9t+dq2LlZJWzzepO

RPmjnSVxzzcCmEgttIo9lhpZ+Zw7jJORnza185jxSt
6sYDgPbg+bpu3ccyuNCLIY45lA1vL6btleDf6SFOU8G8oVAsPFDiNHTCii86u3Srz8ZGokUU/Pgwo01A

aOqyXjBmdgRAxGoK16zDZyjkPNIkHWig/i0SIvbXiSB63b9GiYiLLt9h9ewu8ApNrLm321YI2gubXQY4

n0E6GXSgniAW3sP0Zn9FWI/swzbhW809czSivSC2Kf
on1h7lJb95xYrowCUF4xca2XEOWjtoAmzZ1Xhey0ikv7nj6q7FxRZAy0IY1N8shds1nwoJhPG4jKazC7

NbRsC9eQzpUwlj+l2xWQMSVjrs86pT0CCgO6pl74QHKMk+MbAL4Qxo5eenG6RQQ+mPhKXs1Zyl7F2eaI

IMGon3ekDs+eFoJz/ZUuIH5qnpDJ8QCpH7KkA3nn6e
wLYTnAet9wmxmHGczs6uDMVBNZuc+pIwS3zh8K6R8QTz3rbDJOwV60Fd8/NhGEbwt56D5Toc17BSTSaP

mms9axvCnHwfpGFTYCRJSw96CoIWtnkGL6WDIKp2/VKiu1+dEOfs14sTq7C2JOceaye7zfSRqMz9c8NW

cBgaLfg3gg24FQ4x3Trfhyfgec5ca21FVS4JoIUGge
mv9FdULpQYaPR5AITkneHU/I5C3CGVKPRT/sEUxd5rmaHqyTqoUGI+2hvjf8Qw22xfKbVYVzKAm90TSA

bIMDhX7X1ezgvDFUc7avKo5mSDb42XtK/Oy69sOwoSJ6b8+Rn2VkRM9OPgelfYvPT0M4NwYM1HTT95Qz

neaWwjJYScbcizgV3LIOmTkj2UIVwLKOCofGOTkoZD
SH/+cbDjB3C8+Ujvkj6IKuzP1Lp7uZ7XHu/6SG/LAashVvJQ7M3XLcqop9ySOgVhCu+ViwmczggwoQGc

GbI+IGMPPFPB4G4ikcWCiM0amgnMqTjn7wUHaBdQ6yYJFuctOdWu4CxHTPG3cO+A5YhXhb55Jcm8WR32

kZ2X1ZQtjrT/eCDutrWWsUDrsnxTWkig7hHHC5XlQt
rmJ8FErzN7hct+gFIWHqS7q562Cp3gDQOd4rDlQ2veyZgkErKf9L0qgSr1atlfFFKikju0isti7R4XXx

DtiY7EWNKbwGEzH1C50CXtRjNrUss8JPx4trzQ0q/sVFsWnzpiYFQ15clXjrq01F+0naX2W9H5QWPANl

hUpnKFzkBnFA4ins5MeVheSUa+3V/r3RjkL+QGqH7w
qESxqif+vFKvHQOHvIRSKt4UiG9ObN4opiQ7JJlkVPBuFHOHmy1Kv8hhu9Tx0lPlFNemdx55msLxejDl

W3AEuy9UAcAha31mXRFsv+D88b1Que7TxNlrO6EbuVfX3ZytNnRBfxy2nqIe5LMDfqoRpHal+XRHjiIZ

92a39EGKdkRmT3Vxl92XxeqvBK6kAu72zNnr/Qpn2P
YJDlnGtkSu8YX/9qKjRmav/joraG96j6vlqPziXKZ3oc4AulmnVHxdPUQfJCpv0RG6at+TuRn7ELdjL9

sTJDtAxuNbk86sZ3Ilxe9AzaF/HGmU3zUunYm70J/fdd3XqbvRWP4Nwx2pNijegmfsPDiZv32SkWIQxU

LzUZFDzCnvIQuuVgZft7+nlmvYwpQASpWYlFO6VQ6N
pF7yMtdwaM36CRL2qUFwGx+SR9JTQ1555hNHV0UUWqgpU5X4JHPKRjDA7TjeDEjHnpabhfKPNXliUyTD

WlVCBxqOoBslKAytdnm4lpJSSX7ETeYt6orMWkg2e+qlqWumAhThE8+2essXw9I05nheEZoj8D4FiW9p

Hzc3OSOITbJbIlPjUTWMnTah/WzKzWpVllKdAhsmKW
q1fP3tOG1gpxw9F+28FJH+68DRlg/aTajTSb1XYRhOZ8psVbtyYgOl5XSQ8iuFbKkfAV16DlTvKF+f6g

2/oKOPB+h3tEbE3cpRiK3yl5vhi16GxY/xmoM+Crw6iQJSsS7cTb/4J9z7/imVkhVzFZ8PkKadx54us9

eMoxbokFIH5lp23tQ7ScSt0qpnJO4O8WGukS9fIOlB
DO55m5loXUEhpLC8XxpwshdqwvfpD5bJD+yJykMd0RBwNrAkMF8nnFrCOi4A7ZrifaesPkt3BVvhHDSE

urpQty63fBj5SbmGqsuHGVkjhlsDjljIl9moE1KoJPlgTllB5j629/15a7x4GXhim2Bm5Z1X+Ia2TfKr

zgRlK/aEZ2aMOIQbPPRUy9740ISXnuTW86LUEEevVo
GVUCap7E9VDvQoFkJ8g4nEbMLFTpVfXRrcPTuX8fX51nq/LOIbvxmfvSqKgztMhUK2XXOdvlMHd9Y8Bv

CrPCepDSxZRUjjdaRP++J2cZL9R2OMRC/YXsJUgWgvPCo84L98N4yMz2Fw2GoSiYZAl1z+UouhdNpzTI

ScJ4DtlrpjfmGo6LJx3gfGX1EaTl9xQgPYd8lr9DCe
CCdQRKtmljU5/SePCmntumgoli9SflEI2vBOOP1Ydt7RjCBYFGuUN1XppLnAsBrxE3+k+JK4UPj5WU+i

62fDp5SACrUoqYf0+Yjdww84iunQix3X1wtuCXLkNrjHYR/+rxYOJ2c7/B9dHv0QeL15W6RJnTOpPPjc

Jn0HU2W3BCJUXzejucvZ6YOpx0Fp14fAFO4YUo8rhd
odDXeWMxxQ+WveKVX52N+uo1TbmJABVa7R/g2Ah6Qsk2nklF9OkIuPSywJqYVld8k/B75aSOrazz4Vmt

+NXrO+U0JE7GRErmBA5e8AyKbmjbG+gg7lihUiVWkLPLTWOcSLancAgjkydPEKJ/e8s6334rjoIAHlki

S7mYgeb093ZM1Na6bDCmSFRPUfRhpfYb1Gf0jZ3Trq
hj8A1l4B2waKiLuHdviyJcYqRa6RcD3RSV03jGjI3ZCLWK2RLNmrPzAMVQxHffVf89ZGEF7jREXKyNYJ

2LXW17wGoBhocJ90BRqf8HVOHWMmQ5edRVP3JBhMVL7EXuVenvkxrPrS6N8sMKz/ZV5RpowPn9qIuICr

p7/lAWkXmHd4t3WKTwtLM4dAd7LAIL1b/QldUd97xS
zf+Nn/+UrnYVMhQblJuGFNbDQ15ALIWRbJ4Fi+3jEMmnuyP+qylEA+CdZp9alOk1lycwSwdkJtHRcrEL

by/nLga/E6duUvq1u5VT+hRwuIGtc8pVZJRMuOQYZhTpXCBO1XOaKTi7BYn6jBEn8u+4xsrFYPK5g995

sHuh65DGZAXTp5Bk3S1OQID/dmqNEA+N6IaGhSRBtB
tpDaoNFElLEFQX/jkxHSbVoBzMZC4DdBLmQ7WIBW648fKYuw0aNqXSQNP+EBZiIxMvUVYDxRBLTh++vI

4+IAKaiF1mkl+5bjWbQOIHG288L0QeBEwmDKe5gQfuJdKWphOd5zpcXRzl+6LqRkjy7VSbP4+0jJnYfQ

99OJXj+LjNlQIJzmPKtP6zOQSopulI/Xi7V0u8C0Je
zoAN8kg2hdmflqFLU1exrca8SFVCwnDRpe9nl8O74r9wOnEs7XZ+9HNfUBNBJSirh/yhdyDZel7D36Mk

h8ZJzsTzMTE+qmWt9SxMwzN2658nbSsdo7QqeP8AB9ZQ6BJm9p2kuOIA5//bZpGGdoNOUTlkVTXWr2m1

cVtUkqvCCOdYmphKja46bzywPbwzLvSpMz2R3xzY/D
drybT8gDW5qbW3yOvu+YzHMZLXdrio8anSoVwjdEeFISiAwZr/9tFa2mu3oc718TuGHCytpDaZuN3+/Y

HW4qWqWpvc3Gcc+IecoA8oqn0ZO7FoQmLN5DE7YBvw5agNUrNEdh6J1QbH2Yh2m9hX1i5B/UH2g6xxAI

qWK+9LLlHSfRibF9mIGwQUEGjeJn/4fwKFQ9PRX11B
vc4zQXH9k7wz2IFTWZzXO+137+83uWV6pZ4XsGkv5zD+5yRIaxyOEMS/sGwQ9vQg+M+hdZkvDMsUDsBX

SG7XbFM+lDYFUuFQnTRj6xDFabbu0fdjhnzGvAvLhysCYpLhZ+/iGHD29D2/avGGSfMGJeOlmi0pRP+M

Qhy3gOTI7+GH9+QvakoSZCSc6gD0uxWe3H/hUbV5OW
rCC2Kpi09dYL+QvNHfPWtHSzkFtcNxR8EiDoNgSTaYGqqrns/rCsdCwy6IuZhIOY0fvC6RC7cpNzLsr4

KJssRYjAcF9m9KKtHHdWbYIpKQmR4YTW5Ns8FBgEWyfqEueWgPuvC715JLZDJuZQQM3IxYznqaPX0B3+

8CyDm7vh/ROcQjOZ5UNR4dd0MS7IqRYHvAez0bl1dC
mlcstW4kPzVQpAay6o02TGLqdxFtnaFNWf8mjWpI1kW/8rd/6Ynv0DvNAsxiojmuxGwGZTApAd8kUTNW

IrNNKPtCGFoALkACZThaQvIhVzhsp9pT5GArA36nWL+9N1t1wFeZECKIr3Aq2WZxrr1v7boL9nnZz++e

s1kWV+SWpCURWN1W4JebuAvxPyetNchS0Sfa+7KBaB
/GrbczFwxsw2xVb/dfUyc1qZ7Gu2UuOZppo6oNTd0lh089u1yKS85DUYCEaAvaLMhbiGWnHx6wZIUD4b

apuNIhHfMFn7CLCxplMM9dY+Q6Pc0+bfO9+Kp3Pmz84jYjgq16Li0RTFF3i3ePJHOlRsXEFLEIXYeDSq

+NIq5la+0T1dA3OrKnTgzEtBW2n1IZD45wL+gRb+qA
HMMqsZkZvGrAEWhDv4Uj5YEAUyrdrMUth12fM0OV1xXHUt5E+BsnWcW6SILEt9MZRSisTB4UfWyXY0BY

icAmg0SG9uaq4qXMvASkZRg5SEjVdAUCFVpXTVzk/+XwiASo5LqpFwYFEU6YOPEq5fw15jLDS4pjnXXA

5Z4ET/aVb3vGMg81DxXNd8S6j3o23+F+c5/gewUI2e
WY7Dv0aXnJruZnsqTjqYHz4H5EWRUDAXRzd28ZkcpaHfKHKrCtbzwonnjvES16IELU3clIFYj6vl6nNJ

l0UMBIz1ef4xO4rmre25EeA7x7m/SH8JAOvdGKFPRZh53R2RbwSZSUv4SX2RhL2KjoeoOn7xIs5M7y5Z

ozQswrnYKh5z0SCum8vBbmHkZAsDFRhH3ZJn9D4s0a
tQtXUXbt7Gzu+vxzkd8pA/uLN+Bhr67j6ZG5y3M/p2Fu5Z8B6Q4Xp9VVjAEn/tQGaTqy91U5zzXIUjry

uit21yey7uIm/VWZBUxw//4ZAXAp5m0nXZah6HYwaXdXX3rCleB/5RBFo2/WK96GrVU/jvpyiB37PtLx

4dbP2Vmu1oRx8YtUy6eZ4XrNPoQHfYtPAJbhdQ9g/U
q//Bk2anEhmpeNcqz9j9szzV5Z5B4WABbUys7+tNKBb8LpQZBOS9oqoqDtDNSvtbAM5FXpxgQ8IRIRh9

xIuiC7Jn1Dltz9hmkRhPtK07eTP0N9R8MfCwYLwuTgwojq/fTLOarFhYUs0YuxFObztpa2oeH7CY9DNj

R9nr3fSaGx7nD/yymEEw4U6O5tkTlC3IZ753YOHefW
5u0mxDs/a3RACAVFdVx94/gakPBCo4u3EDkmP/JhkGLD7nkjfTbj+KymFryxh+ULDa66u8Vf4HBQT4U6

ByH/3CIPxnk7gVp5B1PYBsAIJRkZuxV283w9ERcU5yDPjppPNegiycIomiaWiBQMlgOcmJpCTgSHLU+/

jqaiHHAruUgaMj+yGmRqeBhC1uHAO2NFfQNGX4KTWH
BsF/avDPFgooysghegeszYpLMr8SdEbLkO6Ux/TWR7xGRu6mrQAQPU8xYhtuXPr7e6hB7XF3OExfwfno

PIWQDPJnJLLs/QEDnVx+H6KMtmjFqV9DgZHAP8N77RBWARKtUtKrrcH0KTv4zQ0Wmem2Qq9Gcaxga0vw

zoireuCVadu5dP4E+7oYknG0JU3V4kofxCLqDIa2UR
7Oe8hb/8eioF8PY80QnHO1pRquqYaK/LI7Elo2UXqO2D8LPYG/gpMhceN4jb/kyBGdfa6wxjPhiOBgHF

ce9PXY17QYRwBQhXYH4xzO+jZrqlEmmMhY4isg3KlfNex7r8SIZnAZcFNUGIuYJNWX3efF1ImEDvclCo

hmfFjf0A0eepMhEsBM4XCm3seofd88mUqHKbiXiHim
shJWvbeRIy0Z41pifHgVPleuctlG4amzivV76LkA2o/qJbQV1m+CBhSozJaFhyMpWoHH3G/vAom9/cUp

Czd/+11VKWrcuhPkcPs0PSqdsabkqFAcYFjOhX3pQUNNupgomQqvppNtB5cZP1FUb4IwftWSdPowDDAR

mDh3CoqaJQJzB0i01gT0vqYObGDaVsRRHtT3OGLHtu
23ZMih4fjFN0PymB4PmObuHmE0gOzoOC5wFPaBjxJ1oeh59zwgm9j8eBtPbRWBhXSdqDvPzBwxiQ4Ed7

fVZBHXJgaYYKIpO9YR5I0Ohst8kufjBc8crCxWbpNxjiK1iQkqjRGyVxV19HVfy79uAljkDXC1MqCS2p

/oQIMHLjux6zxDQonaEwv9xMC9QJBE3SaXkQ+OMqiO
tcGwiFPhp2340qZM5e0oa4qOXtKM3UFW8Ejff3N0B0shqLbMwac1tNbqtea8LrqZvh7F9KZEmZRDKVSm

/KCehI4MbpQxi2D7OdDZW4OlbjmA/QBO+AEPMKTGcPtqz0eudog1zqDFJOeeUuh/Zk34Szh5ViOKFQZY

/xp38/R09+YHUPn962UgFNB5ZyuJpnvuIfuaaxmxn5
xFPQOPHkFHSYEYC7gLMbpBdusBjY42MGtsUeRl1BNbgyhaWN2mNoCOPo8SQbG1gSLXyd8OSr0OR6xjje

5O5Q8HTP05d8OvUA3dVWfWgo5ifigefzAsay12U/enCK1895YQcj2/iOFB/24oXM0iVrplbxuH5cIZIB

F+kpwxiYycxDZkdefXm5dZ8jiEc1R9sYxFTC+VSV4K
+KiCMjP72zp7l7X12GamSP1hJqLk/+Z4SK6nfDdzOIeP3Wg36b55t4K2AGMm+jP3CshhmJN1/B3HoaVn

yhnndUUxrlS6516AW8uBJIM4DUwkh5rE5JoqEqQgBhdR3zo/39cHH8oWIjN23UGP/cHq1M0eIvU5MJgC

MUiGXV1PCyYOU24BADg4lzTDWz2CPhk8LS/DkZYiLB
jZ5DGdj0ZwWVj3ec0sm/Q1lj060dSr4rwIgvWCWf6IA0NrrgHQrktVy/D/IfgvM5iQ/yfwrl/83NFc2N

3/OG5q2w0u0X1+RFJPGt2rWKXyZKzca3kYjrF+PVZ0pWfa5k5S173Hs55Vbr9Mra7CYlDZfCI0p6BzfK

AJeBUQvtwOCzvmu1I7nu69Zq7Oa7dARVZMnrJgI7ec
IQzBCBpXvy3GyCS5TUOzBqP5yD5/cW2kHFhHsI3BhWLWhm88FvBh93qYZSqifndG7Yvg1JpA/NqPYh8c

MQC3GsQQd14uazlQ3122D4rDHAkGXy4i56j/Wy6MtX51p3XmnrcZwiE2Ck61FLo/lz4fYZiIaeslknPk

SEa12Xpf99xhpe6IsooUYd+aK57FyIDLza1HQHefRr
AzB0nZHzh4OZsdJXAe5t/1UwNRLOa/VkXWh04JYIWZTib7dOa8GzazxZX8Zdh180GcmMPp438Quj1IiG

zMApGcL2nVyXNxrB2pdvdrUwrq+J/yu9iaSiEhSe2xoh4XirETx+YddhCGXPXmKXFf5I/7y7WVhA24HD

d70F48MknHD1IA+vtqVBO93xY2utwlqJNmXuXuzkTf
XFijIolDDbiwd260i28pR9s9F6ZmPS4MA8uKBGphulxbWSm/1v3/8lns/70lumUs2m4p7Kl0rgFkrzMG

MoaNWYd0o7OoiWuJVMyyGfZjMPnk2PYAIiZ0/ujzaoMmBFsSQwUn5+w5HCQRu6A+vboOc2ueafo6lQtx

ofEw1j0Z2UBWeVxdHoeeh5tC/M8lpZbscNmogz7mDL
ghzCd04p+nFaUjDMW9Sq2kkB39bDR8rtqKU/mE+qtVm7Ev486zQeZ2R/ZrXK10wHpuyrPO4cYOO6m7Tp

fUhBuroVNroa6MnNEENtm45KuzCYJcoJJCb/dZG/Q5ccvfCAESjVq53wZwAFpAwpNnt08Weerlctf7H5

LHOHIYzJTwh8jvVb0RMMvOSjK+LAO6S6suH7E25on6
IS8XHn/hnaf8Vs4OxRAriu+nO18xYuIQDB0qLzp0Vv32h5eGEW9SzCOLWZhRroI0VNi+jUC5juOaTbqY

6sE0Ol+6cqXwPWkCAs+hIWyOQVSzxwE+OzQ6c74ouw5sZMApchoCU+P7oyrRtCnB0WH7Lku0dRRoyiDc

qMIIOoJpZcSULkvBLopFq/JmQLzpNiLfz3sb+U6v15
SpXQK/EqUvUaQpVjdgNFODHGsR3HIcy3zQuGEqeC7pCmDVu0MNJbq+fsdQOwnMbivEpkt3VaalDqdpgx

vWJi11OQXQ05ecC+CpJ9ikewBUkPodeM+b5rlkij0NWHcSMsUbsP8rOuoMSGOt4Ccr6IxjDSmH7mYZf0

jUi5vwwtPRrsAFj//JTWE5osQTEu+S54nmwMxgBjlN
dU5aGCnAUd0vuKq870bIV9/+hSahozRTzV/QfBT67XiYTpyyhMyJyksUuobYpmxU6UWMoQxnBRdNjpD2

6m+PmQZWP0mOgPSofmKs6nGbk2aPgkPnSDf0ItdDWqoGMkatCudZ67YvNJONS/0fPsOEuMcTxWJjdJYG

s/FEbYmCAwY1l2e0Q2/f1fnrG57nPNdEmDqTJpPR5J
QVfSLCtjq5qOlXDCWW5St3CKUajW4R6TM54cb22pN8kQCLzz29vHlmjlcG2UZAxzGAXaSGaQwZpOTWuD

GckpT2ExEnTF+mgeCpvviCU/65f8m+QflDoryS0+6SS2/kDNFZMJSO1DO+8Em/8blMi7ml+U0Naa/C3h

PQRC/p6/BjS/WhdIRSW8r5LkfLvyIu3vYaTJ7ZLcEW
yL+/AlCFGKu1i4MoS+O8XGvTt+g+Fl4KalOf70rD6ppNjLwROuBeqyQ4rE5Ic0Vw8U2Ty/5qc9WSj7q4

+vuuvxGi+dbBFaE9Rd+EKromP5/fnIJDqeXjZjedJpjQv3HI0BXuE4Ov/RHYcpkPNCvQ9vSMvxIsZXci

+haojpbEJtTn9UxwzRpTe2Icieu5TI5w4Vh514rzKb
3SlOUOO6YGSen0WNREoQdsJZvz0SvXtDlS4C6fqQGC/2U3nmBVOQ2oGd73K0q02+gVSbGiWicz63uKNt

9sO8Ufc/WveCS+2WTLW7CJqcUWj80FUDw5h9lYTP+SXw6xFMLYLfqhIfTdjw3B9TzNF/ZdPMxLmzClm2

Uu7CTGWGSuLdV1wEthXWx4VgYLd50Ahe8M6MN5Pt0/
/o/AK9XIvRT1S5fMYhsFjvtqvfVpRQF7Epd4rLJ6WT1VY9lxLoQjJb9q2XStIpU4gWntMs0iuSt53zIU

ZHDyKsiHFMSw0MdKRZRYPU4IPO8/acP5S1BBKvK1H1OhKNYY+g22qROyhk4hQ12A4ehTuRwcOvPOm7Pm

PHN8+6r6XKGvlLxhX5zhDSIhOv8DRv+EP+90PBwYeH
UQ+OAr9oQdoQXsKVTjpBYl4taIWh8KR/a1GDSMh4Fa5eV07YQ3Ij+czyCsL7atdpSPE9USpm4hA4SQ5B

QASljFCuiIq5cqgflPQl1meuVdU8Gu/QM9yljSrjwP7wVl/fpJZBOZ7FFhi+ZFEu+kBlUTUce2Do0t1j

z7pZ+Jz8kyvaqnK8R2mRdPiQa1uK+qnlRlSpSW2xq8
RiHyNL04ur+b3hf3sogn1y0/r4cCtnlJe3wu70xKcZnXErT9bfdLV5vrP8qoDhAAF5u1i/ipvPO9GC4o

1+0EeGk59E+AF5gwn4p7wT61VqEyGjuXTsZ6NMBQB/Y9jDDhjPBlgy/hr5oa97ig8f+rBcIbc3X670Pq

jBPxagIzCRkaLlzb3R6AK4L98xhW5/ABfAbnfB7M6V
rp8Cv4Kbld48DUB0Glovc1ZkWJkXgcK0zykpuZk3m+ll3n1s2V/7JIHfWZLN7ibl/X2IVJLfCwXzzB44

CEvB3N9t/1C83Fds9C099vLoeUFQ9H59blQ2D3RKXfL0UzjRg+cMqoxO3JfBwIQgGEh4GKKNBhdY5oK0

CXyYeu3SDKI7xrvJQaDALzY5RZIsw8apd+Woq+cUmB
1WhaWKwhyRzdYQHs5Dp3UnBwyuccJWCb0XK5yG3OFWXx4Spslk89Sr5yEChdbKFc/qrrne4o9QXuRdca

YOhzH+ALY6Q5EebyEccIHRr0Bg2L8q+IgI7OsR9+irBXF5d+MpjCS5GFIkcp1cx+Kjjq6mogSKWRjXKH

LCDBwaoxJj+ZCP665+A+TS1zTSfubwmOAd7rXsg6fg
NHT3Or29M7g+UZx0abQjeopy4K+6fTQAA+1gft0eTgwAjDoermpyop7qD4HYvKhuEqrkirZTb2sziKch

YsWCUMFABPhXyyTA5xooQls7F/kkFQWB5RRUpIgql1RzyKO8xrW69mG+bDqCSyYQHXbU2wlZd1shlPPi

QMEDOp+IjpAWoupcH/mNM7b+2uvut3K+gLrCmGZRAv
D0h2SpTMaQqEUVwA0z2kkVE5+ZQpPvEsk+CN+rPxVJ9FyFpSYYOsUxxP6aFDhNi+CZ2OGSARqipNJm1F

RqWM6j+j7ecjQnnYm0vXgJ0cWzdjR/c+jK/U0Gc89XgeVpVjmqXR+DR3ApZkDDoTkl5V7f+e6czi1Cw5

v8gZXsgzrsX4NUiKJFzEpxvweBHo5mRI/+6W6GX0hd
iqJ5mrHTnaXyyY6ndnM38KZBTO/CB4xkOJPH223EjdLPvp25V4TJejkQhzian2pcQ4AVCEqrsewFI+B2

0QwOhIW4xoRFkcJf7CWdBBqzZtbnZRh4wjRA+WDin4TyvIQmEhKnhTjHaczkv/Rh2NICGQVmVENMMDZv

n1YcOqVoqlgNv0K9XgQ6XvRhNZAsetuz/yHnXXsbHI
RigiESJpPzh640bzhRhmkuM37Hm611ek5M+SQzFfS+yWkyWhnjBo3dnpbKRTo5cVHIPK0YcL+9XoZcrO

luIadFeFVmc20w3MgPVQtle9mpFlXpOFA/9GbcAmAX8uipjPo3XlprrFHfDOyU5DOmvX8YEm/5XcB33w

3hM5VdaZ39RYFd5XpY2uSR5bPIdZWPC20p9qS4fsdq
4loA5V30ozDc0a6XGG/pfbJuLhipwJ1uedQ7HEbe2SRuC9wa8RzWdEeD6T+1VYAB/MxF+XwtCYOuopuv

PK38eU7L7RKxxIirGj+1xOrE3bghYY9/bsHgEVzIE94QHbnGgo3+59CQ/LHFEkFULWhaycP/DG1qxJf4

cyok+h6VvZ68EIjM+1c7RI6o6ojVcFGWMUq8hjdyiR
+vc0ZUdfX/4AKgzCHRxTW/1zLfJHPmY7X1FiOqykOuXZUanNDx8m6nVct76P1LVT5bYAPH1TYXo

CulcLkdb7JJXcH4aFXxLcNgB5d+BxTjdZPyAfJ8OKGoyKatUlzs1eDcbeQ3wjwTZVd2QItZcoW/zJPbA

6lK4y269Cnk6gXmgNXBkeU+l3OBSjPTQjHaHVH6OuC
/KsTeFsRMRzVmuVTzNIBGrBYhYWExiV1ekOR7rwHvojQj5CpCVuWTehsXyCLdGpZIA9vaycOrw6CVQ6b

Tj9WPTqXPfediuNVHWnl/ZV5X0rafE19EHeJT8Aye8p0VxFPkvleYgXLjGca/slg34BqJH3bzkhE9qQZ

YWDDzD7vjXK6QttpURg2Vtr1vCKn1/QEru6qkQt017
BwvzsWr56NbeAmDitJYi7FHJ3OtlNeTq2FwHJX9HQAjdOAse9RwOj5EDyAnVT1Z2N6yE2izB5X/YWcpp

lwM3QgxTo37yW3bDa5y1sDaeKGaILuMwX0c1nN5iGXYnfNX2z8DRMdqCefO8kN6rvcXhOO/2yH85mZCK

UZ2mnQLDkSy3mqkhmVGRkYHp3hsCAWc4DfwXmpJM2/
dE1WEBlw0EcTC0bphlOFndWD81N5FGRV4kbusZ2R/fiyOU/mju85/GAinLLzlbEuBHbRMbBzJ54LvuCB

af4/Vuy9asZ8UNz1B9mVMQUoyMZF1Qx7nz2DwzPoED0e8seQIoFLAEa5xDuPPhDBON3F2CU/MW+vXumk

9Do6locZZkCgzoz2RUWff3S8R0zpiE0SbSPrcJYm0D
pmeU/YzrXXTnCdMf/MW4SyKoc5gCHabk/Ghhopvir6COFS/dSMIrrXhfBBFe7RP1b53ojkqxf3SK1x2T

Ix0iHuCMsI4Pc97M3nNBpFNQo1P+g0XTrrSxILThBBfxr/zSuP4PQH8oZIPc9Cbz5CtHHm6ltY07ec4l

vJJxR1xmI5368ly2+LdI1s2jWdqxL4Aw3vwTkKR6Ot
HVzDxtr5kh7Ue2Y1kEgOBSxt/Cycv/8zTSrpTMZ3ntCN4VgxWNN+AAjJ5Ry207gIslka5tzt1BGKmhZP

Bx5MNKmA008jPsGu0ZMnALaaboHglouFAp96Uo/9RPSsTPje/GcFUa3MgynvM2JMH4HNKRYXP66B3ic4

g+0pw+4a1ifyC0jiapGSmucElToN8MFMqmsD6fDgu/
i1zfKwxHUI/gv8y6zZFmvKMTwAMwqe09vzCdr40y/PXFTvkLPG0HpZbLHjXu+Tt1UU79ZdEw/3uP563Q

no+/TIavzOnkQISobw666Z9rwo8vCTkZLz0kSV7QFYcHzf2KWsRVuOc/NUSqn6DFSMBxveHi2XT9IVKn

c0jr4SM7jHmODrxZN2xPpKy5UeavwA9qh87RmFCckO
PS4STA2YeH/CEg5YM8r6/Djj936COslpYd3Uc7qhhxNquVokohZwnUcEUk+/81XBj+T1E9bvCmzlHqaX

AAIPSSIMBUhK+zewKUvcywUnagfZPxq5SVqtF+OImlp+guQk/xhbfeSZFaZm4hnrCaUQoQmYTWAap/fp

/dpVmGgTCTifbun/Dkzxy94oTwcQEI6jmWl+2jCitr
yhIaGdJVBTE7Pe/UTAGiyJpqqp+HwHRlqeZsHMdIBSfCzgBhrM0kNwFpVJoMorKc1XNXYF3ESxrfnIqh

Ow9IljvZDJywBr67rI3Gc18ak8C8Xym/ol7ArUqYKKGkNaDDgSyOp+jvniNzqeoNSgcv2v7gWEdoZwtj

YxPD10EG4JcEioTZYlvlCGKexDQo/bkfYaEau6EgJS
jAW1QniVEBAVwnXBog7iDk9Yel7yEjNw+ilWjiURO16xt1x7F4r0s4clEAGde5TARPfF0T0en2V8Pk9b

wcZ3BQJkmv/L9uRXrq0Gmh47+SBp6QrpYKGrCGh+sN9QBl4c8FKmHsBvIxXPNsd/mlkYZWU6dV5kgFgr

ybabZk+xVT8NFIMn6yBM3r4FziKbolmYxZCtOFqaGj
GY6RGUDSK/1I8Amoq7jVCwp0vuDwymVxCA4zAKGtHDfZmfeujMBgA4PrO2AT9s7fRfi/Za2NILHFqWB2

oFiyoX1OvWqhZF/zVsfpIsZKBqfMe/wXcDpNjairjWwVMQFVHZLB0HunJZq1dC023zwlvq0SmNG7LXzk

WtFw2Dh5IfBEcb/4+bj5bkSnaQpG5Ut1EAdJt2qb6h
Y/YNUUWrm5y60sMUdJYwXpCgLjQiiEoeK0YAbI9BKDHaQDgHq9acVDytZEqCr7xnyISUtFQlaU6plNFR

4m//Mp0KeRRHQ/5Q3yoOTMcrI2KedLtgGz872glPzjy+JTA4qhiUITd2loQY1trlyRiR8FY/MwV0fJqX

x7Mz1R8vOrYkBLeGfR1R09OrHnSfeVgiU8VG/NShGz
QcL5xtFqESewHU6hklNSflSp7hseHHhie/UEeZUVbatzUOHvJ0ECSfZMv95vPL6+lvA5m2tGP5I6HNch

mwgY5wUZJUfVs2F28jWDdBQB3LpihDzoH0jzRVP9Qgn2DJa+pExjMfFN3nHD5wZdfDcet3ZftX4PLcjW

DthwTPuM5aP3IdROj7ScCL/ejhbZzeuu3jPAxFh9oO
wWxZidxCrQNqW/9dBNXwkgII9HabiJ/sFLvZTDlyr6n5JsvpvrttSaczsB+ne3c/0JieHbEoXdBYV9Go

vQj6bjtEYJxqOKLgjBp1zzdIZL1s4a+nri4l2c+/mXNTWz/c+upN7HhqUwdNW0pXjC0UK/s1pzo5BRaC

bKudcy6T5dQUmKEOkhuPv+eOURzvDui4wvix7fWLIS
lwuzzgpj/Gmz7vJB4MQriV/rqnkwMn1/PpP1CznDDIGvtkGRzettNNTVCI0vHhdIo89wdXsTq0yHkmhF

O5OaaSq2HxQglkSMh4Co/v6E0uEj5v/a8HBiVaMOoN2tdzb3UmH2Q6He1ubXKr0fm81fu+wI8AjkrhMO

QYVB/R2uQehvjYRbOf2KqMyGy+sEFjA/6m6L3wLmVV
0HoBGllbd1nHpa0EBdejeTEH3kqQQW9ZmbyemVw/Bm87AS49iGhQQ4YMe+nzrmyACnS4keTvndUgyI/q

3aV8SMfs0XBsdEabmCQrFVVulePlOWVXSGMltchxcHij4MhAGgjuq/nusQAVV8vuxkRVtJzoXbRT7k3M

VDNlssQUIJ1JRLX4OOYksc3K3KmIBhsNaI4diqM+RI
DMtbd4u82R0EjyF02i0nc+fcV/d7n+/aqx9N2FPk37DjW5CmZcJwXOnfwUVWoEguYOldYf3XaXKDo97t

sLLZLnA50rWj6AXwlaVT25VXQ6uDEtEZEqDLN2yV/p3L/XLkcKcWf2NStSbUalgslqa9nyZ3ldee+O3c

omNiGR5nk2CkUUY/raHJaZldevcaxI9XASZivfiR4e
vh6w6Tu+srNHfJJo7hVoIQTjAIm62GEiapP5m1C1cctb+qehZggEZTZ+Wt5GJVcULxz9RhNdeGy5oc+L

iggRrLVKmw26LeUjM34Aan+tQgwO+njw88d/Q1zMZQIrMi09AwgQcI768Dz64lKc5m/rjqdxAJ1YDaZn

Katie+KzrIsZySJGcRvuJT83etJEJS4okjXcSbOlw+w0
scfEWYuQUi/d2uwupKZqo5Zbhs/IG/nksyfFmMXFZdZ0MygsSSP8zVSWEI+7Jqa8K06Vzs93PWy9Ympk

/EXznM7C9visOhKJvjffKSSNu8s09lz1Pcsf86XGL6iRzegak7WhZx+OPRMlQd2YSbB6J+qB6CCk2KHp

5t8kwoG9oX1FYDGo8dA5pqLc6mxa29RJKTt7rou9rf
JGCfsfLEco+46u4pnH+TPiECWq2br/v5XQbpnI7VelnvEfjrISdCkWiSFAyhju+eyoTfee0mnPmuAqp0

ZHm5uZk+GTrka8RT3O3anOpqNuA/GcCrWzvH795Hy+E61zf6rhQx1vLL8LjAJPx5BlWtFhGiMg4dzd7r

7kIzmI5axMjkEwA73GFLtaxnXEGR6dCaYPvzfUW+w5
3/UmRndD4TJk1VKZecV+GQsHONvuIK2ye7+5NE6oW5gr4ErpNbv81pNXvzc1gjADBSCaQfqu66e0IpiK

UYeHVb+FF1yFm0nFFFO1Z3KMqVGLw0cE5/tHEDneWSmYTWffpLFaMDt5pJe/IUbm8qNGHIQaQZUZkCPc

d/On9PEdMsiyFKOXMcrRIIQZbobPxTjWwhyHj6b/Le
N18Zyy8vV5sp6JQGLe6yMC7d2l6z/X/ljMgdzxGD6pec7hAlWVPDBtvjGYeUmhQaCzr9tEz5CIrhGQn2

GsR9OESAHcbT6e+upl3fg89xhHAGV+SzkMSe+9aGfqqJ/MqWmdFvYufKNobhnQ/ow7MvqH8qINN13lJY

hMIyb2SAabfWGcfRMAwKWh+0RJ3TeF25QDNH1E7V6n
/5/TMRvw0Nsis0lMGl133eD6wS07ZAjacfZiMjUlsNZsunRK0NV/nwVNIheeTqhL/w3nF0/2qK/rQMr9

XumzqjQWX3bhGE2tHNT6UXY/0/vXh52fbmiUioMCueMv6D6VbzSgfcsGUDxs3AAh2p7ImZ31E67vCJtg

3U1u4YClNPVLyoOrZUzLce4HYGbrtXTskPCtwWF+K2
wyCot2/+zmwh7obtCSniZpI4QXa7ilt0YRC+28RpHil8YKx4e148IdF3rmbCpl3iIXBNaJAM4ThGk0xD

mewTV551wGEWIYt78qvW0r6av1q3T6emB+hyp+P1SwOLXx/nhbxtTD3CMsnWQeIMVkaMIAsb93ajfv9K

O4vYpxmvTquqgH/CnReRpZkZovwxYl332HItPK6Vif
LaGGlZ+2Ki7F4D8fKCVe9vJVNijwPEQjB7ljktng2iZYiR65kwfQ7Taf5q0oyXDZP+SMeWln011pcOfz

FckMgzzV1SGxYZlnWYcEJOCbo2sG2eqJFR6zWzCX2dCxEat/zriH4BPu3KAkPxkMZEOlbtCVV/BLzdX6

C+fX5/U3HVPtUkjcjucqkN5E2SXTM8TOrysnFDbkPL
lB3O5zxwDuG+4+cavOFq6q/VNNdyEbXvnTTCXPTJusOVZf3P/Pc1aIUI1ZO1jl9lbhUb+W9NeQEJsYO3

iVPddlH0mK4k0fzR/uPH/h0Ukv95g4g351eQ0JcmacHaYtiuc6YhoJVe3Re8iter4UdpM88yEUaNYxGf

FsTAWdDLYFls4mKdAqna7wGcDK4wUmlqBRIgdAxyGN
cwNliqVtBeq+QVfr6zlxRJi3hQ61PxS6TEYj5PJjFiaGNQzEl/1U39Bsqg3RDgCbdAxGAAsbvouWk7JX

ofAS5tPLHiotHgqsjW4tZIX+uCTJkVg4YN50FmgQB5jOIlC+8DDL7QmWpv3nLBTMvlkDaDQHJSynKL43

H1ETdhGaVECmJVbXWlxz4hdHZRtUkHO89mxi7AnWNO
SrlWov7WyAOlXyMOh18BfP0/wRSx0CJmNM1w6AHpTLjDDp/Jk+I9J2g8C3Au0vN9KHHpADnBpUtB7VfA

ECFAsbp7ThTb7tqt6hccI+ePY9j5xpnfgzu6Bt3fQKRMgG/oM1v0rqdm8oyPrXtHIjbMKB73UfnbiY4I

U0mPCzXpip5eXrQb6VXYlMeewY/OEgPxi7La2OiL9s
2SMmUojfnqohfDMZP4xxgEZLqo6EsUOAgDZHoTu0BSwdAb6+qSpzdTmsXFl1RCJY5Y6jhjGVllqsbxlW

uXkKez6amSyq5lYXU6FbNAOoyf+TRCU6R2CusPmbEhENBNTdAAY8bCWW/hafQC75Mknrlgo4dpibOizv

HgbuLoeEHAh0P9lzLjcw31zu+85SC8AopM+vHRxnW+
e5VkmtOY5QbEHYbyLMFd81WPzT5Py9sBu46KKWw6Dbw/PTbe4s+8Biu5BpMNqGfbVPRx3DshBKV2bKL6

cpGVDlZPTuz1EaO8WTQT+YbY55yj40huExWc8Eoich918iAXIlpa6pWAvCjB075ZRcLmAgnjNFsTzgWV

orOfKV8JDwa6ixbZ8arsRrx8PswZR9Lla2ab7uQCWy
apBoC4VZRUDvTfySLaEVnQhRaAIXYafql9sykiBGq1JC3a211mFggzNGPyOsjoxz6bu/UnjjN9JD82kF

E/bU4QChuWu+jDHWGsUnEQuRqODgDe8EIIXBwru4fdMvU+O+/9kUO/h0joIMFasb6C0tl4Y8n7NM58EW

Y10HPKWP9btKdCWjWsfZTknvUvElditM22Ovd//QZW
O00Ge3xM71O2gvpL0Gf8LrUJyQ2A97Xn23aaAWPKF3/diZCiGYAwnM5vCnEl6TEdjukF/1jwV6M84JF2

R8rwz6kDsZpOmnmb6fEZM1yJ0HLz8uFK7L6MYXnfb3szHv92BpKOeY9uf4VKg03Nht3yi4jLGqw0WET7

0nSa+LHofdK+PYJIeZK9+Cz+P5rCmPnvgHvsGjn/PG
Hh15GhUTmUKpsq00TAWu8U3sE0AdcVSf548VjebWKiz8N3YLI4uj2OGnj3yVuJIUeytFOU/vV0aZvQgh

j0BcJoLOcrvyDscXV7sM4ZIBKpgW5j+xHiHPkC3NfjNSF4Y+8wiMwyzVPacC1xVWed1ICSiUt0BJnVe8

+nMx80/xO9wzlekAs6liLjzPcpEct/rnRTYDL1S4v/
9LoWunILjx327wtu5UOU2NDgahZ0eNFLB2YfwlaWJuiUqkBs0J04qeoUSOczJuZeKNUdZuOU+F9j8hse

Mu04Qyja65YuUNy9oohBvJ9un1fjilsGB9XawXLftstUrriuEJXcUHuG8aSBzQM/q2xX8hK1q8Zrc5Op

nZaP/oE3YbdzcA2WhrCp0tnpgfwpFDni3/iaGxgtDp
yd8QC4DSgEnPdSKIov/ZioOPB3F1JEm/TMyga7l8uKGp4vEKrk5Z7svm+0KMZFcaz/xA0c6M+6RB6NP+

hJj9xq/kXyHZq0DSqTGvelM9heBHTpPfjOPSBAQwlE5YWZnZgs5X3OF6BgP68yh+7v8xPB9IRdniqsVT

IRtIOcjXdpRwLWS8+Lo3WSQq8J3Tm2likbyPm9L1iX
Wf5Hmek/2aIDRejcv2IsoNgHihmOfkPD91sBV+TnAwJvA7i81bwDsIBI2KL/MUG1pv2cPV1sF1ncjHBj

fbv4IHiDAQUptLTEug25Xh9KshlyE0wgezkN4MICFtx7KS+Jov/7i7MQSaAEJbr7m9AAdv6Ki2xQBTLV

hiR63F6f2jY078vSJBU6XxGIAXE3gt9X62fYnTht9a
gAVWGe3TDu5K9goxddoSk0j4PravqW5ZVCrr0hkPoTQoAjdm/vzJYoasEKtPTmd78k4eTiTP6MVKzUZ9

AVrLVTTAb0fZM1oW5i30KgPMj+L5a8Ua5dgcZL9galrUkwx9Cqnm3nPOzOg6KLMDdGbh4EkVo7/cmFsN

zmyLS20+496h2c68ZtH9eA9yxbvJmsPP3MvF3cxMyh
66YjsZdQ7bdwfRJ9Dk39Hj0K1d73OXdjOM8tqe0iSd0ZAY9/fzTs2zTjcc4xQOsi9JANdJSEpUpUotov

3fnVgWQKvncKQ4Al7Qkrcu2TAYCeanwTlJ+GxCZjBIB5SEyg9yWDnnv8e3gZefyZ+ky+UN2eO/l8kUPg

R8fEl2IxHhMF4JZKVbdT6Elf64h207tF2Q45Edbn3J
EgiB+shnbl5yCKxZJMHJzvwbmFiuEyU6B/HePg7MFHHyZdAtJo5A+71a3OT/AviZJyZhzQ9K/5yJZvKo

dzbN0t8jmXCXSepAb+rhlLYpNVwNb/fUY9UeLgLAptyOBo1UWIL7TYP9OPHZ0FsrTM7Obyn+EWCc9LGz

70PaMpvMqZXaXGOrliS2ot3yEuN8rYL3mv9f829uzZ
fxNTpq0GuiMKTg/9IoasGOv7yyyfGnOuo9j8umbkpJhS53Pbty+Pd+m8MIav29gxeR6H+60xD6Cp5dUY

shbA3OlvklVc3dBu/GYktMdjjoww77mfkk+aAqH+ZOep1akCM5D/yPaKNVQMqguBdIuUfnEmFnBmuoT7

H+y2nsGqEyTftMBvewEMival6nCgUfbe2WI2gCn/VY
+2vTYZ/SGOyCZr4fruA5XBCGexhrYOyITp1vGzpdX7gl4qIotsxoy+14sXPYLf7alxTt33KvmJ0GqIuf

JGGe14sSpcxlGgRbcv4wSPH1RE3YteWbH+OaKpgBdSpqVI2aNvaGnHHtKUV974is0uE7u+HR63qWM5t4

i2KlD1C4+f2MOHoG9Qo8KYRZUl5qcmidbCtqLeKdNo
sogwibk8vslkB5qc0+kBVhcgsjcgU1Ea7+KhvCaUg8+huo3lEeconG8VXZjEN00WjXoqicM2HuwwRQXL

vqr8iyF2NMVRO4NqqiPdBWW0CGskM35EDyc4d+7LdJbMZWDImPtg92i2b7oQ8nB49St9vmEnLsN95kKe

ehfWn6MyyuYcJTdZMpf0+9thE6IKeGyOpoEm9ncnp2
PiE97Sx+LpeoWw53VA+gLC+4dFXpmMFrJsdHI8sh/YotJNfztxnSIvMK0PEmXyFkLAmtPsZOz7PJ0YLD

WJj+niyALbhzEJrKED7iyHbz0fCZUJAqe1wR3xfvoX+CMZm0vl3XKxl7mrv4y2SQUe0C5xBtyNkosK4b

/Qbu59NhXkicK/IsDh/aLLbLSSBmNKd8d5S926MiuJ
p22IjfwP04uYycV+hziOF5kUd0fw0C+qPkN6/42zuJwkoB9RSPwj51q2Stv2Eh8rGrsLGRoTADadpkhT

vW6+Utk3kOTNwLK+lVT31T0KaVQqrPPbLWM5IcMvNA8I4A9u4on1H01gSvUD0Kqw4WWq/mgWvGxIchRf

qbkHxedC5fmv5kAhy7e8FlwDgN9AC8FKexZr+v3pfc
AVIfy+MystVC1m9n6/4zJJDQNKK2UTsVLjZdJLj4r8m8z2RXZGfCx38zrfCMZ0IGSpavLclFgcP3JwMy

bhqWRZZuKDHc/Hk4BhKVJAhAn+DtzooI1Wj4LYvG17kzNXknnyF14x8FfdHFerWABYtwjhDX4PcXCuZP

W6MzDlSpXQB73v/kqi8kBZpLs47GttUCoWP07wNuSq
uL5l7iC1LewPv9TdPR9nX6tyP61Cp0Fv4+4huigARnNoo6XOCtV86xodf/OcXrSqhXldDAmjlWfVViJi

wo6rP17tsZHAXV/jAQllaM9Z6CXx/ZO0BVQ/DLv30ozpaSk+/Q2wJDhmHGlKJY1cGykVOMao95LtHDgV

GEtQ/h89xE0OHtx1/kBWZgEjobOMw1aPuWDSxL082h
HWwYj3ogZC9V+pxGHh/XBzoUpxGl3oEWH+ssQZ7G/tI3WoXDXK/CgJXQwmnz+sEr1XRjz3huf3f4kRyE

KnVV9z3kN5G3M6LrVh8rGLsVKFtzG3u9fbEe7wzVSldkhZxih0qfoiC3l7+9lE/j9OcpbEuSVaZiE0fT

KCdiwAyMJIOuZOkUvP0ib/CShLsPcoO4l1Z25QBJOn
+ond++hc5pb7rLGfpgK8W18Xqn8orgxpcCWMvxoTQQcGI/vxXI96KDtoLEbXLZBcUMetXAjxUZKDUUvS

MgFsOw50EmhL2QMvEuw6qvbVt7ace0s0k7PTArsWhU2eXAgOVLwh50nSjuXR5YZoMhIaFSm5hCwSe0yn

ili4p39iZgZcs2Lr+ITHGZHyjAJqFkDrKg82/Rw7f1
D4a3JUBTeF4qNGw+Cn7ujr8j5DliXIwmgSDq1zNXdT+9clfStHKImeffdl3fSfSUcmXTl0pVa/HLj2PN

BvUL2sEolm8s1WHpB9TQjRr5qdBvHsw4q+i/EEqn77QvnaunC2OmSF5FOElQrlVes8cwv2Z27cZc71PL

6fLzxXDmsZ6eG5kfU6xV99FdpoMWMtp42+fnPWDH1l
b3iOLj/yuQtZlEs3sdoPq+UUzvMlydn586pWVLlgem0qTV1/Fq5XkNtYQSeZZTcuO6nqziKt1gg77Vz/

iRTda4V9L9NBOSCRwJUL8HiVCwl7f4pNjebSqciez3vFjJaMCWrb4AjcAXpJPM74Sut0z7O35asdH+u9

YOaen+A18UaMXzh/0pRvRPcASOQGqWqzJGQb4pXd7F
/Bi5E2ri9W+zNzL9LBfcyjlCnlhsy33ldmU4oVfKt1FUKt2NtMOfwQtQI1obGV+eUJn24uHA1c6/r96d

xY9+SPcOPnGYb0IiYAtKroxPFSoceRt4pNSfWLljJ6po64c1PjA6gD6xOG1Z7P9ITb8dd+n/PAPoqLPq

8wsXQvA2OVsxPz3/OTydQPHuyZGt2kP05Zs6DC0LwX
Pkqd3KzC6BeaOPxUHrT8bMeQYQ8AfSLXycqKrpWwRSMemcRp6wLWYv3BfqbzA/4sSXDehM2pPZ6ohKzc

aQAw0E3WE6tT+wrGv4yNZrTjwrUpwk3/Nf8jAb2CV8zK/u7AHwSUHvsZwRdoXVrYSm70pj/d+hJP6fWS

JjjpgqTQjmTCUzuwGRlCdv61+I15/wVB7SIJfzRdGU
ovRfRq6+0+g03xvKmgb9yWJtnr2hBEH33NEGsJ2Tg+gM3q+utOYfqnNK1jnn8uzY1yraJxMPFCujy3Yb

4YpYMmgU6k3uWi3uldZSVhGSnQDf0m00Mv+TMAsQcUlzyjJSlUk/cK3E9BeCp7irIQko5FR1O46x+NCn

GP0bna+IQhd5XuVr+qienYU9T8i10GbhXLJ8q/3Y5B
3NlyJ1JVhHdMD6cl71Oxg+OFeTfN++WTW7x7e9vo/gGVkY49ouaphhui42nlmv/CH8zsVwV6GIKtmy3s

7W29y34Bqpym4IwrqYGmIy8DZWlbXw6DflzLspoLMMO7GBmQ1zLI9ucuLnvMO2aab3TupkwVe1bTS1NS

YyY77rPGJ0lajMTU3XOaHpuD6KbDPSO2C56shRstf8
jbpcwOzuaoRhn//2JWM4LZ3aSR3i9jEdP4oAxT/Oc/PPGyCwmXDeQ9Id6MR0TPCGv1H6CZHuAIo8eqSm

SaEihW9XaR4FxwiEgp3wGXAEjVU57fQFf3YQhe5TlZPU6HS/ndNINuq7NOXnjdVCCWGlFYsErYJ7BukN

fVefQgK8ELeIq5+kc4S7OiFIbJ6hT79HRuCvMlZvB8
SKGTPADKvoMW5BW2VOw6CEdOVhyq7JjyxVSQmZGxTpM+zr/Q8uF0aUm2JcNQEZQMPVOtq1l0aY0v8zX3

+8sOlhRRo1OuKwldfpwTSEL1K6oWIIIREkSXQl0XT/BFQsH92hAt1QfQ4P0ITW5TbxdwbSESL8lzCiCU

Zvkjo+ZhDfkUMWXmQwgmjREg8ggFZOEaWYwjcBO8ic
K02c0GjwuuWpwrL4QG4u0isA/O8qAbG2Ms+m7Llk7Hp9ZEnXEViFicz5sYFO2nsNTYyrqwhSmirhyZ2w

A0iWBxQsesrmGmIieL6ACcENuan48355+UNHmxSPmbSQjmwLN+Jhych/3l7CylkfjNRxsIT5jQFVaHn0

9RApBWg2Rqb3I5j2/JQ06df5YEWPzGELrt7z12+X91
iAsyqr0mkihtJB9S+fSFdkVKzTmiA4iSrkUjs4H4XhiOsMmc1xSgh1sQzqiBrrzk8o83TuQft5ndEOkT

zoyeKVJ2o9BVavg7fpJEPBxjc95Gbr2Ckl2dbCNQz2qRG2aCATCj7zFtIArC6yPoeGk9hLOizqbqzQ6M

Jh6Ph1C/I0P8I9zy/aGa8XmnsSxH06AasXpgrvIHu0
sYdJ+siWPX8VnwD41DyNmLDK0yj7nBOqGkQJCvG+q9LEUpyMyOEGXnuljnewLObMERDBrdW2KOsrOydI

uJrkHm/CMtzHKEpZ3QvwX92wklWT3r33DWiyIkxdeEF3s/HXwUF/kjXblkiBvWjzjxqLs3pb1OV6QUaU

1Cnz+6do1sLF/Uva1W6yXsVzc7BF3wW/OTzTWzoud9
otcipQl0SZUbAk6YOV8oDltEoarzAvgNWROLz2v+UOpVZiGWy3Q6BZcYGuRhLS9Y3IEu1/M0ek/Z0n/A

Xe9KRS/gNJAkdm7xtzucddkUmG18RWTqElDOMPz+zfO2FXyy+fDwXDj81+WdH7Mt6gqY0o059tG4+BgY

iiLCZW5HfLsfBcq7SsNRAMVm/QFjPfrxx+Kenny/vGgo
a14FE1RyvSrw2RYDmuZD5FncOoBjK0ddnq4Etp4BNP3RH5F8ddmI2N8Jji5UXW9xLdd43wnyi4a72TE4

NhC2283u8yompFcZtf4uQXBbgcu/umFe3aRTHWBQ1PBWJMJvURNemkY//y4WLBGA7kuvv1JVzZFr7OAI

hEEqbTNx70w+vTDti84aLi/2rYq16Vox8L4/d0JPhs
GxKSguLePrCs336xaAE3oV+IcKtTQKFzHQkquihAdXwQ2t0Rba38jLBqSWqWJ30Vx3F6I4kSxHRBhb24

yJy9nkhMT8LW/zLvq0FXzhLL5Wexm73JHCanncHl8L3QMqBy5bK3ybS3fWUWxkFHN34jzbBa3EANmjVC

ceKKbmrdTN4HoNh3UpTzvckvDLArCD5Zd+4FFv9Lgp
s6GVaOMS57zEa1EDfAeM0qmT8d2pUA3owdjFKtWG0XC6L9rynlcUsGJ3SFTA/DXo+wm7leSmrxnVyLdn

joDWk8vHj5H3q9sVhVZgP9f/zx4/5CsGjTMQTCV19F3YzXRSVyVyfvUqMxOdEjUhjAPqpOJ5+zjMmFqt

zg9hEnJHZm2OCorF/IyWDZ0UTVBehEnvMdCNgd0BSD
OifPxp79wU6phsMxAlGb0fFxEgiNx6Zj9XNTxkPJ9NS4ttULDjWVxQSq5Ej3FDnZDjyY9KmEyFL5wkty

o8aWrBP/bzMbLyzmFGkveydbsM0b+E5VMfdwhfMAZ3wbEprjNsLCjM0riL98a2PY/OWf+awv1hsQowVg

DaB0x2EZRgSW3ZauCJFjrwR5U/kOiIFdrx/rBVHplp
c6SE6WAK89umVjS7rNPwEZwY72fC8RM/trhh4xn9gHpUNNOItkVEM9rT8iccMPHjUTX0QHxBESsLwc+/

2RkDRq9J2P1Jc+yzT7R5IruLeibelrI9ngKgxeSYjnoDKUKA8/ZEL9Z/iEji8X8SNW87XoFAbVmOVkEU

s8EvFmoc/RteVlsSmS40YaI6Lnq87LXNyDrWnZa993
Sq5tBlAD9PyRlyUj2P0y2n1IpkFBIjzW0dI0mekiY8AEkCZ62wZ+B9BKrUW/EVB2zIwHE3KXwAq2tYrY

xWRze+5FRGoszKG8n67CKul+qhMrAMsf+UluETfqrO9RcXxLCu4euIpDOXrcebDi0FYVSCemfTMX8A78

JXCGQXuQd6H2xBibaH2NIzzqh4fva2zBZYtSPrmRq8
PbfjDhBiA+Ezb/A2Q9+WDqwU4o8ofzZXR+rQvF85FKb0apl0LU+vjNFaXVR5YaknVOq3r98hUmlCfBet

FQq9RafeqiJMVMlV5BfufAVBAjPLXZLl5/6c9o4K+cqh3Lbkmwd41sWekUjZaF+lr/9SHJw3EykyTOv/

6h/aXM85dK0jffF47OrMI1wFgcuWpFHRp3WCfWr6bN
HBdBTArcCWHf1AA4LCyJvidwlpJZAwbd+Xj2ReckvyWr/Jrksu5ofY7MvUYdxONYIqK1u5h/bb9ZZ9NU

T31w5B45qffys82lO8qcG9GUl5W1F/ZiXXoZSLGMsxYBiwPtRX+CXLaMjE9w1/BJ7nroD+52zt+x8myb

8W9qVRWAtayhoNIzY9On8C/RPOTROVcb2kdY3Z9Zgu
e2CYXOF/Thh8qJvv6+2KXAVPVn8K1WDW5nJlqtWmIKUmOWFDGJyplwZsvWBCjeX3mHE1hZYG0n8W4RHF

viBgsRQ/YotELmZD22KlvLQJ2G07DsSyvT56YC0GA0B7x0jGFZANsPmOHp58dVCsGtnwRzh0fa/63J59

VLu6dEbFG++lXwkJTrF9wzB1dUP+8VBPUDKBBNWW7e
IBlD1bRE+X4eZ3bhn7t3KhPC1BdF0vigExK7MtmzW7wvwRX0nnHJWwzrGk1lU0t8sVzxw9qKqfNhXLu5

itQDRRGy5j6xznROybNgGNzKhHxDV758QQlcN4Haks1etzgr+P4Eo/x9g8RfxaGY1uonfgoK58Lq8Fk0

hPXnxf03GpBgjy2Cs4ZVMQiAfcb3asFfJy/hn3dRad
+PQ/BoxNtf5a9aVjUKcRYd4zgLSBNLION5shstl7F2wax4f0QJzM0Qz0HuxGtH85Vhz5VIwNp6kTKZ2u

I6I11xWZsiYa0xMShEus7I8hjLbAZZryYEvguz1RMIncFbIEgvHGYKBWtCqoRi563aV3ZzMacuVAWyUF

gF1FMv6/NoCVf9BaEeKZNOtQZmVpzy3iDNL1PyAXpX
zjnsoz3FgB++WMh5b+Yh/hcdzwB+br1xwDIaSHkHuLgxwjk8FLjkooxC/AEQzvG8ONSL4Rl7w2mmJCP2

/tWyxgwXYxFLprhFIuzPCAz6bys2JDVKYUfjKU9ST75YrEjXt0/uXeozAi5x/cDoUbBHLHwN9N3OyrMQ

z6BOqfy97J+aKLS33Pu/7XRBOFlkusW9Srj37hUtgA
U52WdKzxCg8gmjP3f3G1ocM8tmNzAZ0qekdjitwnBVZhVwcqTScQxN8T9z4iGd5TXqBizS60fJ+aN1Dr

knj5FrEVmS5lxMj5q4kPkvuRPx6QCJuQerlaMBfVhZRKGe6eA2z4Lz8vtaLMgNXZzxbtybyU/zMMwjaU

QT7Q3rhv+3qebVzo8oC0sLTA3bhe2onvmeA7bNJrf4
2fDNFeM/O2Mb1NxuJih2sUzKeY108Z3MYPdXTNYyqKK00Kkl+U+V2zm1gTWsObw5vCtJRcpp+rr10/l3

s/RynuH9eqkana4gfW8zAylVhvKPwj4g7ln5IbdUKOOKueGyXxbe0DBcEpCccHoJ92gq4S9DGasRrjIA

n24qQFinirK/qlh7rb1NJk9TfpmrFILP/5ABlAVXeW
6aIx+jOSIqNPsXi0OTKiWAfBgG5mRsC5Jb8792/oB8ocgHs7+XI12KocYcz255nldA482F8j+1/kYxSF

8BVznVuhoTHvzrbL1YIz6i599JlfjS5Cf9OBw8drB2UfYAf0hu/cbSQZSNmBtZK95woaYe519Bqh71x9

fnrGhzELtMScnRogvLP7qQRJwDbRFTN57gh3owLKZT
mLmSIv3fI0Q9js7JFwWn+bHob7eyR+KSo4YV5nGeynsxfvlDTwZTcRG6XPSQFkr5hYAdKYLOKive9RG0

iZRxawA13qiZpoL/nTKEJyAOg9s5H+eXMMuAQO5MMlCA9kPctoz5UTpbgBb4XnfkKSHJ1B72zWj/MkX4

EPhmcqAzBW+Pennie+vENrPNp0ZQgGFrskCIPC5s90AE
3lxpvF04/KHgTcmqpqcEc8gZ8dxQ7cmp+xMIV+BsPDMsrYl7m/RxB+ycTySpl6NaEFJJ/CQ6oGyLUuam

agcbH035JWFQM7agl2hc3o9VUSu+2surGdEzleD3jcSrK2sGLYrxazhJKeRmOwvkTEhDWEeUHuuPlkJD

Mdefz+3ESCA8Yhb5AUqQGvBuqg8qN5ELpRG7AqTGLQ
+nl9J92n9p92pUEwkN4tJpj1QxKnaDGQqUZr+kNqwuUIXV2w8uU8kOM4zAykRxH8BtqssBwboszQ8lzJ

+cFdxbMs+QjqhWD+bwKilWmV/5i/t+t7kViUcgfEJeWTmsXdhmJ2+QrYksYPVMlArh6fywZiKbHCz5nE

b8XPXJF9LrjebSvr98aD3Se24x8fDNHsBX+qcpPrs8
59klb5pfubPpbOgYDhKItOY2RsQDFtY4Yc74pV6iIRAlHRq+7cLnZaLCSlfywamYhpR6XlWL6JhNWvY6

9/LivYutdfX2+xfT1yecD6xdyP30m3sqdExen2WT+Uks7m6/YdhRb8i2YD+8O+z590nHXXUaGgSwiUMR

zHq2FQHvLsZve3tMCbjGU05i/E1PmzVFB1meqhG7o1
swcqJRfOByy3VpF5002bNdNV6o0gDRsOWqO2m/RrEQs39YRVD+M2bvlop8Ch/r2bd1AFtqG9/Smx6oCV

QGB7Kfw0sgcQmaJH0Ve9yA1py/Jlq3TTv0/SHgPvCHm/mzOeVRetrUj3oGf8UwzrvDSTu+ovDiVR38qh

gWWX5f7+McksT/kwx538vi8CWGdSHOGvGL78uDL3nZ
071wkKuannYEBaIu6xMNx16CKtiTkecblTBBAP5jHUfmxtbeZ2ocez19GCIqPN9ltyJZv1JxsID8rUxi

u9wUsuvRYw3iCVf0sGaxNNIo+gJC0q9HZGHl8n5en+LbRIlQTKnbNv5KcO90Q2FF+Y4ycptPC1H1u3a0

Rwv5d+EDWARD/JnEOgfNz9Fw5bdeDVWa/vb2OpK1M4986k
Hm6WtmkDZhOy3mWIQk6mWyxGvO7zrACVKFhtpmPELXla/5JIWcdKlzISZw8Cs1X7pwZzSOcFosjmiFXs

8h0czzvfVYdJ9EDS9aCuM7pb+i7l/1UkzdhX1MJFV5cxk4ObE/cc0luR1TAIgbqewaG79G+v4BUXRQvr

eY6BQzNE8/wRTi1WD12YBQ5gONiQPcbI5Hel15vI+9
xFg0mFs/n8kUckGur+nfAcat/0tpciTLQL4L7BcZSkLwjR1WmJ3tJfCu2NQhbdWlIFfmf0H8SzAPFKA9

OPAnUVbWdFK5/FoMM9I/jVJRVGVRn0Wwsm2/l+sRZVtE+2mok5gm7dkK/zmp/A5GkeXxzMFWtA9H6kwn

eC+B+QLq4KMkJahatM4ckla7vEk/qMdlbwXWtzc1Md
y743HJLltqhbIQdQH4VFocfF61163ooqzpKb5gLxqJMESKby3YE2MoojvLqGuoqPexm1EXJJi/H4skrU

lzcaWOjbmWVAXZPIzx5/304oyq7XWyQ9+kaH4x574LIkPwAItgCCWiWJ5z0zBz/dqF+Xa+Sw43GuzTAZ

n4giPwD8m/yTkmpY3JcpFOUT0sNr5M5f/Tot+02hep
F/epMSeqMLsQRnxhTO0qgkc8N9/3HOiucGSdrBF1CD/6kCT1QdfgXNliMcK+GHJHgNa5WaZ/Shreya+0u1m

/3bKROEJQKB404ZCKeTrJCpFzXXrgiW/1oeTNcORGXK1w3nXIauw10fenYQFzC0YpB1Peeo+9gF2jW00

s7iZpvGs4XL1ZYJhxFKP54u/RvHW6lTIjJWQ0UnlR+
p7twG1kKJvExQNkXQtqbxZFOOQXYrVSpN4XHI4bMuNN24UA1RpJ+0LfvfbvkM4qzwabH4jIqxvtjEPxi

yodOkyLMGpTq+AGlIDlNeGyjVRoIiq7bxTKy1zO5yDgIEwp9CJSICV5O5iE2NBvlg3w5/xGhb3QhsDXc

QmScTSdqVjwYlkkgulnpOqKuxpFkZH89M7Gd8H47rD
2+DLmP6MoaqcYgYCcXX1jvrGDMEcG8dsQMC34bX3U0x6hdwmiV7hx5zISWf2UuaU1pueXQUTe4jQat7n

sv7c8h/w318TOn3TW5udxEIm42kwrntXN0oHvuWhqJoCCXIpMxCWXEUv+wvMzIY19jKla4B8wDrFvYcq

B1RnvxWEi0bkLVYFjSm0cE1Op1ZxFSlm6uQ9c4xD6Q
fEzg2ZhHQp3d2u2kS3lFxvjNVoA9a8Nzr/e62tJz+AFe1c+8unYBCoFZLIltsxLFmZbsyiuYLe3WT06G

8QmIQflbb6Fbk3VqZr62kPQFapk0tlG6rYAMJ8dI1EcJQyW459pCe2DxUQwf2y6CkLW3mbeO2Cl7wSOE

YxF1eiV+1dPv5EHeT45bCJ4ZOD4n1Rf+gj63lYjyvA
TfFREff/8bbHVZnXPWwrpEUq+Z7Mh+0segdpKNr5gcAXPhvfFsgUf3pwMrCy0nD+NERg/wGpzxM2XTVz

RnKg39STknDfEauNWjJOGXgq3FhyQmZxfHlzlNi9O/zxnICs3j7EZIOGhWfuUyyyvSnJPc3+4Yq7XiWk

Ov0yzkzPvABUq+nzGZFRylguhgctftARQYXt0cxSEH
p7d6cBr7iPvTF0rPw6OF7wk4I6tv4Wo5yK6lFJPeKj6fkt3Hn2MiERb4yRvDtD8tEBTS3f8snzMTG3Kg

3qhVCX73mTU949h7+ZDEEPLfJJSdN7Dfjb5Ar3IHDf1+ZOkadhJ8/TiB6WrB3pJyk5TxyuILZtpkaGDg

fNc4Kns+w7Lx2pYdFOGzmy+XB6Iq3YxR/NE4YHbvcU
U9ink+wNiEfwwba5+aUBneABW5HO+qAdXgfgF/N1ubzMlu2RGAThkpDQRjBDM3ZEWQWmzLYfBS4Gxbt5

7nJsUTMA05WhPH8ds68ou6P8Cp6q6allH6Gw3anGbhZk8hprqbAJnasIy/Rq7rpnh2a9mwpzE0qm+mMs

4AhPHRC5Nx+aFPlEfL+mm/0Vznws/hr4hHT/9A4g2N
sziJ0E5NRknn1eW397hs0sfx+exaP/UDPgljacIVs+oOx4rGfheKnqt2RliAaG53WS1kz+7hHBZvREJD

jKSLuJLnCLahIyeEv3MHcpmZkFRx6P3As2kiWpjWzNZ/9cl8zx3moxvUw/J7GEqReDpHdWNyEf/5OSxl

GQTr6SSvB1nDOjE1zk+msJB32aF2LpDXxnwXcWNEd3
flGmw0pAP35fwVSmcidetAdPxmuIUbScWn4ibO14ArTBoR2W/nhjneZNWPFq7HcDlwNNJEvnFeJ/VkoP

2iO+2kQ/KcZC0uJ0qmDz6tTzfhcXlbGd4ZMDKiEHGKuD1T8vM9Q3r7T3gSBngr2pER4QfoktVNvFaRwf

HRyL2QbeimBU8O2HxcEtK+xKdvgyxOl5KvaCF9wbTE
gtUtPHz2K474Km2dpyIFcZiSy1uTFL3QbGuqX1Ylbcqd4IyClkJRQAnVVE6dgjHKT4Q04PoGV6VXFaNz

ti8MelITKOtBYQlqm6Z6GkvuWpegl8sVugWXVs7vaU9DoN6WFOdHGTZTWw0+/16ao0BjnDnPih+EWCtf

/LwF1sn11XGFdT+lhn1tL2DA18+MP+KBCLHDN0RaFd
4PZ824Ir+OL0rftz+DaTtlc+r46psJc4/e7oU3rqFC4aXZehP2xtFpTSi5Oph04bUzA8Y/5btukoH/7/

p6V88ZiTNX7KzAnAhsK7NlKGkG0uJQlIWm4zZT1n63E5nD7F6XmnZ++PyJ1C/2pdRFnLi8FZf9nsv2zm

E0wK69DmTjWoNiVqNndtmN3iizhM+/D8EYtQCUIBVX
L6WSKJp7ZlYnwD5D2NUq+6oUcRzZ/QCUqFmT8Csa5j/H2NMyEMCruCaJuPeHrAFfwu/YUOHy9UMET/2q

5NMbhawjv82qXQapV2s+0Nfs3hyKmgqQEvJfLPd5GxC+hfZh48CABvYrua67EHFN0NYPvZfVD+xlM1th

yaN1WOIeyAt+ua4c0xkQ4LUqrTeQx9jxEySIRKXx9Q
htq89BlpsJTMLGZm7RviUpkpM3mzppPt6bnoZFTPoTTBh1pqIo1csWgnGDg7WHwfcpUfoVn/O3RFAv/k

7rEvOtftNKJ1SqFC7ijrbiKTueU/MASApgiQbZqNgBu1zG4VF+pvkubJwqa6SqfUgakwL9EWdjQMdwLE

xyeZIk++q1Y1OWJRX/49Yc6+xC+dwJm0vPVbItxf59
ojPJeloboO2J+nRKo3L3/n6+RcY2TNnycI+LYmLAckIsX0WuqGkkCEUNGMzfrAoETOKD76Jh2DkOMr42

e7Q+w+9ckraHztoNU+V6GSLRCMIrR+Ph4LHKdZvUjtGzwCYyExbTrKyyzBR5KZU1YHdfYsAKn+87aAYa

4S73quQyEdMHaAGyUimCp6WAjhNw8nhO9zfffuZgSP
+iNHtGXHwRjW5KvA6i57E4awWESMdid0ApiY9a4q7PZ43bi+QK9DSOapgh6xpHd5pU3nY9MNh8frJHU4

KwFmgpiU0ZIXdAUjvEUff2Jp287cyimGLvQOrPFsmfDgnfTAGhAlAwJks86xpGqZ1HNUfsS6rglrlzRO

iTIQcmYySXXOk9E3MD/MbTVu2iRO3II3G3ebdcebJE
6kkQdFClpLqxkEGlVZxHNtxC0sd6B6CVIJNqsXCjJ9ooHITNix4GGqB9m9hkZadqRF0gKbKhoqGk7tLh

xwIj9r/g4FBM+N4/+7tM5ff8Knlp1JpOwMr5i+M8UR/78y1G7EkgqlflKbzbgntJtHaseRXEqm5K7zfZ

Fe2EF25dC/UIbFvXftIa8KL16A3Fi+y2esqtbDKDbR
f0x9xxUAp9mCmoWJ4KR0HIMrmnkai7lGblQtA5VQ+NKKjnZtRlsrIFTb4ZTpObkoqCId+xfLSOI24Gep

SzyygaR+Z06gQHF1/bUN/l8c1DYKLXMXEDM+IxdeJ5CMhStv3tfOwKz6/c0R9RXfYkqvtNia2B25Kgqk

YkOHBeqzumtPZdTosHGeXF/p+ajkULD8860BaXBklT
wihtG2X1EIv4E5s0hdlPd+sxur8DBrYI34BCPtkauERsmrDN1QgKiF6e8xHdElpE4Ah/HaMlR0gOeqfH

UBlHP/h5pmuTgBdk2TL0x2X5616HZgZNQuZysfYyzt7d//3mON++7Y7/JuxPkFTXD7LoYy0Q6rAdxVjx

4AOzDhvCX2Pw9uT7unPYgcN8uss+vsvCD5flxTCbMs
7g4Vz4/qWwmQ3bzFxAvoSfnn+WZkkfk/3dkOkg5wrKOJD8hn9iuZZc/eAVLMKORhQRJ+kHmyEOMgi6nO

n9wx4jEdVYCh+J6TPE5HTdkn/c5eyD6MuORb7biue0Ovlt3V8MqMG5YY5n1F9qxcHQRyCfrYJe3ENpFp

wDh0ctTWJWuuRNdnycT8B3fQJ4+9FqE+K2u6khF1xH
5FMhfBxSDjwqRhiWfdCEuznqYijHVcXu6mrP2v0W/a4o/vSWudt3rjoTi+0bVaVyKz8cGm6DGFFkV1g4

Z4qKpcDJ4X9m6SHqlwT7PCplyCMiIPw1BY3F7MWJePain+D+y41TeWikC7FUkKOD08FUXCTWPMGHm041

4PEwJZrijn7t4yCYWfmqhnsE1vVNeic7dzetjNDeCr
YHyGpuxa7PsvLeWQCa+Zq6Ow6R1Aewj3UV0oF/7tKI7UYPWMiQtUx6yi/tv8Pz3ah0ce5WFVIYCYlvmS

c0PGjC4C99GPoiSH6pxLf2lRUbk/hbWhJZwIrlHI2i4gWXXQ73ZO0HOoDvGSY6UjRKWabCi78zPLOIkC

eQWCo+4FylsVL379fNl5e8g5H/XBNOqbKm/3iS5kok
NPQt53116CTYzU+XSi6Gxg+FAWkNZtUUsY0iLPW1io+m3DNGMTGj4jk/HF4ORMrABaYE+r/bu9o21tRF

vFqlOt2ol8SPmTYNOhzgqeT2OOeWT69IWDfAxp9rOVCRhenKuXyk3VUaBeMNubXf4hgKIZK+490vfQ/i

TwekZCyuWBiCrXaetCbdMTWsjTe1aWFonV4R9QYfPZ
xI516TGCxrZI5Eb/emXpzm+jOvwmWmhGxYs/qJ1lsdTO6aHxMO0uNEELL05gF7DPswdHNklnvsI5qcUf

XpHgURfESTEmqdMgM/QMFd8JLH5Hv1Jj1p5J09+oH+KxEA8T24kezlXrxBx7csx9PPm0HFJLUFmALPgF

QW1/TdPz43xTvGUtPDc2v21xANPXx9VH/NzzrARHLD
TvpeUsXSkoVhRci4bPBZtcI0+twRyf8Z8v7eV9rp8b968UsMrtiKag+LQ9SQIgaA+f+g41qFk651zkZx

u+p5d+zAv4sz/iwSQTqozWDfzgJMltW/BdMYd6suXyz5/aFUpEdxYBF08ByXGBA3ezy7yKPgxvUElMqk

yj3bQ3NifxO7g3CTwQ2oujs3rGQt1ptZyeqjJ/t8J1
TNILpXRXgWCkoLRV4vALOZV4MoJnWO3q4En70hOlhysMzDZ0wNx159pmXooYpBonyKsOwTizLpRb9GEo

ti1Exmg9XNxsQzy3z8u5QTBvsQKq6W9cXmhCpQS7MxRF/XcrPYjrB1yGu2rIFEkBJIib4gtEabsoAjph

vM27o8n44jsTRt+638oEbaixx7JKv0S8/w2UmwmgFU
mW19AmJsdJqx/gZWk92YMR9j03/IxbF4DuEQfEhF93Gw1OGTpYlSyFEzW/hrBLA/Guez3TqDxdERXVth

jbSCMwWu4GUfMBASYLZbh26yByXAywxkkhnG2sTQZ6pZ7U+WyeY1/JARt8mTY+P5bG3VibCablxK9XrV

6Ye8exMF/ITPP7GY6duo7wA+36E5+YVbswdZx9eNLF
qfhspy51RSxYyQ6yk7He5H8j8ZEH4FonbzcbRClJGwRaDpza3SZpODmUE1rMR6Tm+S0ugE70Zd//rkaB

yL1Xe/X/yOQkclekDkT634JgHca35O1dOBJmtqS8FNfLnOCCca2LtuRCMSmCmUcUwixgUoa2o1J8mkof

nwAyLgyu/g7seOihNgs6OP34X6Mn8+q3nSrnJoap5s
ZGxcxgGoFgI9reFiv1jz1LP9q09OQ2Gn+dtLyW4tP01eS5q66QnFYuaIwecBZJ2Cb1cjF3Egi81+Xi4P

ET9AO5RvAvn7qoZw2Pgp0baBOzxjwAlUGR8OMa+we/roimSEFLdt4g3bwl24JKEBP4nHhpYzd4k/otC/

iqlOWQed3711lNDWln6OX7CVxv0rlZ+2IbG14uMlR/
pk/TMthmHpR4lE5MJ1s7ay8qGwj88OROQp5qpD7672Qlo96A0nZKiecS6p+R4YAJi8lCm6Zlg43dr1k7

szXUfLAkdayv8ixJXZaGyY6MJRHN6rZrT3VF/FbkW1QGJhF89sjSy1ZSR4jV8CX01QQYxzsDyxpdhHks

PhzNvJUtWlaLc2oUt0FGc8b8QGFaJha1owhmMc570O
DwgD3UB1OMCxZx+4W29xXFSstQ8VCI8a2oFt6P2ELAzHUGTI2lGLHhS5NF9ShVseL9kpOR/ldA7mbLJ8

rNXqMSI7hol054ntm30wlxax8KkAISpSHAzr4k2dgV1HDlt/Wc6lDtd7tETEdgYC6o8qSPP706Z8/HCW

AiyIRBiorKGzbaEim0XxAs76S/SpADc48rA2nc3hG7
eNBMlOQ8xGomtyGDvZPO0sPfi3kNLX2B+jxxvadci9UzFTAAt3nZngHhfkbpbUy+2cXBZqZXUjF7EhHX

PSV/kr4ZqoTjhMKCtGnIC8SGrDasq10LXmfNxQfTVc+EU/ajeAOjPvtvzkoXuFievZzkm57tfq1IYlKl

Aeht2TSewsl4d40l0hoEDVKaM+bQK4TvjnfyziBmPz
DiGCH12PMd78dVf3P0RroZMmAN4FcgiF+wfCV1H8d1hfOQzhNOfMFrWSg1zqtMaJUuCFWFe7DQyx6wsT

L/1UrYv20jI7Dc0jiJID1B2cqgOM2bTv72GJLdSn8C4OE2vyUI8jzpcENGFzNT+PduM3tRVfTz9ZgnPp

ALVmU8/+188+XWzdmi7M053ugJy2/QNWPed+zXGs6F
lF5ve7spfLVTu/oyP87rC6tDvUG9WvfROAjIFh8gPqQl8Y5DCfSRu4OHaQaNt81bMfqzZk0SkK/81XN8

Tcgz1pLf9t4MekuKZju3PPYSvOytxqZSwmmgn9H8d6FWpaXH3pVv21y3COYT6FDajnOzTAlBJcEByT13

GKrz1k4dbrH6WhI80QGrZO4v624qAPkIJwAT8h41Qi
OAya6y052IrU7M23AclFaE0+UkWUJAV6mOago2iOODOgCltM55k6n/p+vfTHnVbo8+Y/2mh0wofTzz3S

/7TAmZhnp0+j+yZOs5MN3oTecc+LwLssLgtJsrNicpCrkw8rAvukgwetajoWpCz1SrMmgvzX4mITEjiY

Mh7326mBMnoljODy/DGPSi2sJLupOA/IcgViHa2HHt
XS896LILpP7OztspAzn8bsKdayZsU1jIF3NX307P8Ux7/YB/29vNnHGOsiGIBz5FQ03QcF0cuViVbyKc

FuRD0IB0OzzsZyVQXdx/H07PO6aMXXJzsrJWz5XrvA6HZj5KhLOnLR7bes66aOMoXpiJTF4NK6h4AFIK

pk6c0h9oxiBTOj3iP8bzgLwyihZ1V//E5UqyGmmEjF
XEEYB/mbQXGdOVdDdfrCPJxTRX9iJ0qiMqDZwRM/8IpXAU4PK+ob1fdjIUFNWf5LyrcrE4X+m39Bq1fZ

VHXUA2Z1epmzCanAjwc9Gs1IXpdQL9R15yHJhu+DUzgJUN3LANG78Q2O5HFXMWvjnfTP7L4ZrqK/JV+9

MhGldnaWsIQtNEHl2BZ8jWHUbvc1dsseq7DIjMhcB8
B469HOVP5yfuGIuZ4gmM5Dd2m2zzr3EiYZrk1fpJogoaOweqKvhZo1alPaZ8dMQ/o7zXd5sy1Dk//cHT

M6N51JXv4ZyfpRxWOjnosuT2A2HI/3pFN+ny0s3Ahh2dtu0Yafl9fzmR4iBwfuKyE02oI/mGIv1pIth1

h6pkRhwjXXb0lwnNOLLcIwh+8CjzMIegUny4EmlV7K
cdPtlOwDOor9j+64flYQTQ3P3SfIjev86ldqc2q4S6t/jpMUpelFXnVBLVzYEbTbAGuVeLqWqFt3lp00

RGjc+sM4jzhWjJahEzL0HVBy+8MTMaQ6lnnFacnBIXG/nJMv2HGXn7o1vQxIjyi/+Ka7IVrTPf8fa5Rj

/xXHv58p4JMFilr5rB4Dx83h+LtyueZ6Gq2pK4bEym
D/aZ1nIt7rqVLAS3iDQP1YG+8OfQl6KCbMQnW4FJrnmPhKCLh7+tBZe+G0SDkDiLhG1gg15Jq2s8tFSl

ZwG9sWpcBItP06+k2nPqjWhZ4rYzz/93JKU0Scti2gmSBstWWeschVlxgmG51kWh4v+jBrIvQTUYFzaD

RJiTJvf7wScIlueogy03qoMAjfDUso+uE3wcECb9YW
lDfwQCXgduJK8P9SAImGeqMdGT90++JMT5sdY2Q50Vqxpd1cdVbPv22hBcKEqtQ2lRaKbeAqu0A+j448

t7C7OQxpI3ia3+KoZKQjMVdR0xi2wLW8vmPvXO0SsigwuNa0h5dbsxbErqjLRhC30dFf75Baj4zX3vqw

h50bFjsatHnXwva0M8DQUXCw90hNpmxwx/MG25jPbI
Q4soQJVhRJ9vbzX8YBjfIPoAiaXq/kfQpxgiwjOyHszLByMq8I/MJFTqzOOdbxbWldprokQwx6SiVoTz

NpF0B5hqPtIa27rdQHv5VbkKwKwvf9v/KZVt8npy/vgecUFYytTDGactT9Nls+iCuCA5chfxacRw2kbX

hiQWuzZUetehqj7xxHgz2NgRh2RwjarnZxXciycrLz
Se1zL0fWmgsY1R2OKvc/Uo+cIK1JydK8LXv0o/AFQO4G2x5k1V7njxFfEfZCgeaNRgCSKYHX2WR9aGZY

VBg0KsgBWinDLZgJPgQoKysiHQg4wys7215woE2//JiGkuAv4RHA9F7Pz098EpwUuv9ktwwe0p55c5Ve

MbYJH90bDPRhWXe+sanXl+3u/i5B+iKPqoaHhZVvjd
7ld4Gb1B11O4owPSwZukd0hlZgHJEHuvKbBDOKCvTIAS63XMEtsszy10ws25+whnCSLqh5Q+nrQj5kJp

Iu9BfYxDnLlRnFFcpW7GqoxtyBT9ELRxfcfp7WEmvIoyFB4QgDyasv38i+KvxZSPfJurjXZWqHuGAkNf

Y8kWVXd2uA5DcIaqVqazzjSRIDCFpPPNX+MEirG14C
ZypK1cnxK9hhf+nNvNE6PzvDvc1TOWxoJCihK+uubtWAjKzuo5VQA57OZ+xu0mV48whR4ybuaKua8QEA

/u9bchsqYywbM5gYnGN5SsfJ0yKxyo5HOmlBQ/2jM334xeIL6kEXd888fnRC6AXHAaGJs9CbVLH0kD8x

Zdbybe+19ZrUysUwgliZ0BU97zXN48nmUSN9zyFqB1
SPk/FtHPd3OA75d+X4B803L0vw4x3CbtebsMwx/ukQJsfILndeGi9NR2E/iHxCKeqbHs3XTjUM11igmT

W4PeSix7MpR8DAyR5yAVxA+TwYu9+XqBgqj6rtNt9c8fbDdxbGkvlpABSWaQwmaWe6g3ZyyIG+JWaItd

mcN96SYqK46PpIaGc204k07d6kbY8tbEH/1pzFqHc7
seewvVpIiZKJmnmPCU+RlFe26M5lAQnWYv1Bz+V2Yyxu5+CDBd4C1LZokS7rZ41LY7b1OI8KN1eJUvhh

Zv6KjfxPlzS5qu0tYsecmZf0yR+6ji7vtOYWIT+Rii9ayl6GI/ktilkBwaQ7f4GH6X4OvaGPm/+QyCrJ

kgbBHbVOvwTTHnV1BgfSpLfrrDCNU1nf23clOzlUI+
75EoOYHtmpD/JlFrHHZZQPajpJOf8J5QfO9wxx+I6OTXp0SyxPaP5TqRdZ2VXcbV/xLo32BqedqxG2uw

3atskhYyIUo0sOegKr8+vF3nOGrQg1XUjl3gTchlNurJYarXcxgghjKbDerzyz86vgbAGKr+7EvdR1DW

/PZx2wIuiL1AcM+S5JShIeKne30QzZdt8eE9kheaxJ
xOj6tSVjbIdHHanQvRm+ytCXJjn+G1mKHWt2DSf5gx7HwJhDsCIfcqhdI5MccYshzFwrWlgv7qTd8lqh

GYC4yXna7E6SJbCQKK38WkjJ7pmhO3GGnfF9dxvvKIbMxNMNGQvu7WHtIY1q+Zr+0xfvEB5iI4IwpcX4

a9L+vHp3Tyccj3SeQPjm+rYl4/5lE/tPSnQvRIpEsm
1kaV2Dhhk/p3ZEUb9Q6W31XFCx7Fru6JqFJbBGjIozPhQ2B8JFMAIKxSH1MaaMkXpi/0r99+7SbdGgTO

RK2HVnLEiJBePkLl5YvY2+C5jFoPbNHpiuqT7/R7qXC4WPeEO1tw0y0c1Zu2hnIObs8j04F6AKgdCfny

2f3GD9YT1SgjwtVar52D6cZp7uf/21ha+lHiOkCJCF
4cmSPzBvQpQD4ZNw3jqtKAuyVAyIkWy0vOQLFeBQzDhyVrL2oF3I29r6t4i3izNfgo/2ci8JCrMqhqW6

Bx3c+v04swNqF9C45yCTmUjaJP8EZ83wLe7PsTCwm+3462ahHJKvwH+48Qa/6/Qvz1/y/V7NG5SIj/VY

ZJONZVcL7+quGiAdTH3VYreJ1AmxfsHxaoxR0eEJgT
2v2h3UyeFhyt3e6f/g0XW5V09z+NlYFN7ugtw0wraUOyuecCEL/JMcoKseIEJq5XQyV7z4kLIV5mQ6XR

jaScXJJ1Fgi7kffazrym6w3hAz2QT7F6bup/kyWugPxSsKlkxa4wZYfKWx1VP+esIEF5SJBDZWFhK4NA

CmxKYowwohMHSv9bIHQhouurB4hEZD8f9HdU6m3Ob8
29uuYBiwd5k+f8adR1qx9edOPKKtZMuu/ok9zGfEHg7GTok8EguQPMjn8ZVsh8gyAhyhMpHb0JuacINw

8VVoZw76pyzW+/TTIn7gPqsTLu5FVkH5C3c2yCt5hSUoCBG8CslXI5POG2kU5K6KufYTL84zA2he+oKM

inTlP8Vu5aqLAyhfft1aGa1nyhxbTckVTjQK1/zDSC
fk/pB62n4LbRfRyoVwR3IGhCplEl5qh77snrGh5Qewt21ZFtrJfk2O4S3UmZjia3S/Rd2aZo0zg2Knjf

QDy7NXA50x2y67hYgCFv1494N/CUMVbrU9wauJEpdoMd43BmdVgTbNT3jFzXPH0iubRZHbWfQ8kkUqbR

8UfPCvnz0ANchH/x4fKjjbM4XLkAOpORjfFcATtfaV
apq5smQEx6zxvu+4lXz9OaoQesLprGUqkFKkSogjaTESdim/UzKc8ZuSX5+fHfMby5pfKrUzUePH04x/

ONXdBSaj90648fmCx1t822T9/WM1zbC5VN6jNuKdr428y3uK6GQLnCcrafYJw/IE5MVvtGs1frr2Uwze

cisPrugIXzXBU46+wtKQVkZGf51DRP2LOFnYwpmgR7
PFHxX64j815ECEOjAPmSxiXHKZZPWZE6dgw5cwSsc3bIZgtGMnIcUlNUMVLTf9BEgBrtmWe/p3qcPT+0

Cb49/zDHQ4Eyg6Z005ohAZTOyR7NggzrqNKON+qP8c5UxAYqaQuAm9lgYd88fX41B2jzl9oRzwjF6z85

9IKPe7L82J+NXIghnZ9MUuGX9WCF5oEiUiKEwR9753
WIQzOqpuNGESYd25BHdd7eNZGS4FsRiBwq5Cbt633+TEB/OXCAwtox+YGXKHHMSFg5Phj8Gz6dGH7QaS

5Uo+bg4zZqIuM4IGrkw9Ccnk4ZsAvtzAVT4L2LjeTH1PIzslEOOsXw0VdD61WW7KSTClKming+rbXGqN

5M11yG3iE4x+jym+WmnDCFRhniqhya3T107p5beu3R
DTzwl4LBs/JWQpQOgAx0z3UKCIHFrZqNuzhgVyg3RgFJq/45c5n0Og8g7ZVBx2hFKaLC5epyfGuJF9ar

+6oUkEDihInsyteFo/cbiqWHyGN9Bkiuqmg502Cq5zZADtrG5U/72XMczSGe2kN2eAiGoYepzFUUolKE

O/rztvGNYkI1jRDNLuX+VG4ivu7hz7S9MrZiuxDU//
G38QXFZI9bK57dISS5VwgQ9aI7v0N4B8qvptsaqcY4O5AfXSUCT+BWXlgpV7CP4qf+8COtcvT18QW+wf

nZOFzHTVf59QwRh0qz/UgwRgFg2Bhr8zrcioSP+4OfSc8hm+TjGPglFtG1VgajV47hdr5Ca7dnomBzgu

fCk/cjivfHpk3dxyIZVyaLRq07+uVfhQComC1lg4Df
yUxlPuyuV91vZsTWDSrd3yMZxO8CBNBZm2+ciRCk6N9cFeBHYnlM+rY9t085XM+Jaiden/UrTTUogGIDRw

mlnWEFQTpQ1oxT/QlYo8X3avpZ0EJGO1OMtUhAyoyxsaJ83J0+vctz9sdBtZvH08LxAYJoqmYpsQ6fpd

Fhe2IKs4qcxOaHwICJW+kF9laKis9MezRxiK1y93Mh
v/gqgwuI3C9nFhhx+0isPLc2YHXR6qD0fgm9bZ2oaQOiLRXzBMdksEl+cK88ip9ab6F7GsO+v2+VmPnq

+6swbV5Qn3wMDNc34TaZ6ghm/pI4D6o1ha/RizazomYNiEW2T3xyuXfsm4+P/5RvTfXSOW/ueF4u3zCh

pZ2LipUSyqfIUG8AsCkBdtiRpRPdYETzkbKhl9Pk1R
Z7W1Jq/9AsGaU8uSw45W2Kb8ktJwax4m4xebQmfSFY6giOj1CS4yQZvuK/qf459catC7NLtULSGMiPr6

og5a4YGIxYtHUCLFMr2hQwNoo+CjHQf6ajPuNRoUGbBZ93N+k49O9zk4Uu2BRxa1rf3YEtqHqSx3FlQB

yT9QfhVfvCwCQEGqy1BaZdWCXyN5sZtxeoESspVDiz
gyLtR51m4FSOPi25xyr0SGX1h6wm46Ue8P9laASFvgJ8MoFgFWNnX6j2hC0jkLnzWXhJPkIU1bXz5UEu

X59hWzb3z9HMLvS90o329+bq6me3PgNyIjIecefIuUgA66uxU7HmIRv63sK7xE0R39AjFw9I+13qVNxN

69zp5MGguZcc/viIiXxRUx52SQGhus2oVl8BMHbsCk
NacGTxjc9JF3pfHk1+SuNOVDJcotq/KqCJEtRq/p70/7MesGPyVTmeQJOnk6/IJAvyTwYf3j7oguE9nv

xhtsu7bxY2DoJ9oPZIgDZDX26be8ChECRqfIqW6/sknnaOz1pKS8jbHGoTX1iqRzr9FcL2uTlgqbw6C6

BoR5bFNtNoaaExkwMhoVQQtrOwgF0/wQcPHoAb2qZj
2L7ZlutT11jrcO0dAZb2YZTxleYioky5d7IaEOEe0c5fvx94jBAZN1XSTFljcuf1NsMvOLe9eKAF4Ad3

t1P0cP0+eJt2uWY8Gtd6UB+1hOyYdpW8nam8i3ivssCQfZuzloJ0YCsIk3r6L2RMKxeVZe02pXwjWtd4

jIa8EoPR1S1Wxzl4fZ6RfzsC187rbKB74yyOl1kUXP
7NkDCmPwt6uTL/5+sBHA+OXgvB574hw1D4dF7PEJKU+d+LIj/8XttflYnD8wmVjLvCGomRxruagikhnU

6iQXenMKIVUT7d4Xkm+9h2Zi42Fpgid2b7g2gy0Fiz+R/jKpGKES8qltr0mIEKwJ/cCnSX24AETwCKrc

Ss7vn30uT2c1Vl+VUOpri1QfwoqrZ6jOIzk/eKC181
rtHF9Qh918tOGzBVQiqMzH5n0Ud850M/Kk+2OOLrdYR4RjkC21SkoI8exrG51PrIZy0asCzmw/KwAANu

8zHl3FbV7Vlpd+5UDfOYjZtMhpLNNZ1vmOY0s7OQizoqVvEs5V+h9O4EuuiFz+YBzRCLa8yk2jmx4sIr

+UCW5oZolo62B+rdy9d+KaYIJOEdfvGA0eH+U7RmmI
bBLfXMubZkE6JpkZM9cd4Bkt5HLU2QwMN7k1/iGjiiqZHbiVntE8o6AisjuPIY0J1liR1NUb7xUVJXdX

rD4WDGhzZeSbfhFs1uQBiLf8kibHvOqo7ZMHyOD7KRcGTIogYgZDnDbHr4e9LNBs/GBEpVxKlBSwzhQd

Q2Y/y2idyHBD/lwPpYtmqA8VAQBSsDw3m5oELrU26U
b2cvw36wCJjGzwPZgXkiTojlJXa1lLy9/2g/dWXu9Bre2W+JD41WQvJCVa77X5RJnPZwGyqzfD8M7yy/

49DqN8ftjRx7yUyF34QW3Pt1DJuQyPBU2XVnBvOYRHM2iREDc/6BRAf7CpvalGi8c8LK9Wy7mNGR3bHL

yc10jAnvgqgRyq1gfHS1S79GuFzxO3rYsS3G28H7RG
KgeLr/Wn2YY9SqkFC/khiuYoClQTxou4WQ8Nwrs/Kdn1tuMOsy+rxwVG6QWdpuyfhCkvLY2gTbuerII8

sYebCl7N5ieNp/EOBTgJR45kHVBN0b3KZa2Ly+Vj4V6/YYPQvzD0A0i7B8yWWeXVsvLCea2bKDhTOXWQ

u7vcVlhoIqwPk6HHKIKkn0J5Z1at5GXj+NZ3aglggD
o58NFpxu9GzpsHgdTjYTYiDXMIi3EQvXgyJEcM+qSXaYBS08+Gqbl7qm0VUp/Tw5xd0Xk2ODk4ICHZxd

or0PqZDkuXOnUpLcv9KrqEmo+qef82Bu2l0FkTZjoJkO0IS0y1rj3knqIlawiwwL2uRwhjOmOJ04CVr/

sBq7AT5KR605ZzdJCNLU+1mD0Kl3zrGP5aFXjSuDEl
9qOoVy5MoSm2hXK97C+34efEkm5G5bev5724ITIly1h01tV2853awY8YfSQs7vwszLJG4+oEzKXeQuib

2wDArmwxZ4Y7orPsNeeKhHybTrU2QsD4iCxdVLuzcOHpal/x6a+/YBRoLD8oVwr/uvcc4ZnYQs0T/ngy

+xC2WDWNiyBCdQvIoazi+bk88cg8fCCJPeeDupiuFE
D/8hAoNTBWg3WNzL0IFrjBuf17dueCR6pEKuQVSPHsiiVN7beX1Jz3AyCFAx88JNuTSMdwaUnzAx39tS

/ED2nFyw9HvyN1CV/I594X6I6aMdNwTDRPFj+vfU/ruugJAC2vmSFXwCbd886TiYbFt6pFwwfNhVQrOR

OnvvjsO3Yqtp1h2MmN+2ptse5sekj8w0xVKDjNQnkw
8dooLNm93bPdRRlqId/+aqIKKoSAlSKVx+Dx0m0qfhUka7VYJaPLChwAXARKy80A6O+EcVHrnzC2U6n9

aTN1MRGEoOAC9fHPusic1/McUCLo20rqatFOVAy8VQ204Z+GPLF47xeLZdswCc5Tt8F3la5FMKLawmU/

K432WszWrXQTnqqDWWY9CzMlX77/ckbDP8UZhPGMid
FAOZ47m3MeKlfld6gh6UX7iyuNX9n7ieVFYSchXK9Mc2YXe8jk/i4ZXLSlaOOjAqpaNfPj8O+KI/rxbx

bXiB8FySuCu9XfTnDWrS6tIh7/HADkr1IEqqzeahyjKzp+kJxMshoStnSpTkuPyqkcKl5Abz92EQxnLb

G3BjgnlAmxGFfHB+5l9JnLb/GNAbLfdgYPOjqOy/Fc
WR6GoAVRLqbvMLLkP3jqwayvLl+eYRqKEWJcjl8HBdNP9F+xnDhXpVoJ3E0ISI6c5IQ3F36zsOc+eily

dCNlwEQbAwOAF4xSib9sbW23ZdvYWBd3/76FVJgwMjz1Bc9qD4ZiH3MC/WnM4+Ami437G+FGqIUDy4EN

63K6jkWP/xFwf0RKXAY7xe5Sssq+cofz+/RX4lemVS
iXathO7HNObV/XwnomegB8iYnmZUN5wGLq+lJGVlxJjLouqe/IqHmGNvFUBj1fsHMA9P2hFNZr2FU1cI

O7WOSqONAXutx0GOQ28860oP3gbMwhogJmWEEZk2DzfI+B329kS0AUfUEMlrHdSZ+QIGPluR6WnXIZM/

kHuNTElFHgyIVBpR7lvhta5ZRST+ZlJZz/53jrB2Fs
FAb3t6cV25Ib7mf+l+XFNEjx/DTjavhYgk5GZC8I6unjaO+aBmsg2HecDMtMQlw1tVuzHZzRV6oPIA5g

szR66nZ+1CzZiSsp7CnI82Gh/ESOu5Nq2m1k935fleEH7XyW3kSWOk5GN9bBTsq7mEXc3iTSnVML1UEs

Rh6laJUbuSkZCycUZzTd0laa41yBZcheiUlzZOhaSS
at6zV+/4aUofjWIepBccEjYM+wz5kbCf8pOfWVG7+3DUEOruulrKRRZeIEOzqTK/[place]+2hBh9JlQOpatL

FfJssm/WPpARgn2mBo9DzophqcR9vVeWjAb97P2dsnNL0gJ5292n06Z4R/OWG6MjZFzqsFiR74WpTPvp

+ZeJVsFag+v5c2L/CwyfA6m+rGBhSnYmR8H+Ef5Dz/
ROw7U0/KmhOOvOMc5bf6tp7Vxd42pRzFiyVq6cknFtzr/f/nHJPIVX3H72QVD9waZsmBadust+6t9H9P

gzd80PEizUpJ7LEciYNZ9Tcrj36w6KXYV/mfn4qKj+WX/X8fpUsm/GkplIiVyImfPf/I4LKZExIQnXIq

VkpqzncwHeaEPyuxt9jZTjz+Q48YzrTdYhLmvALLLl
wdF6i85HYyg308bm/u3MelPWQ9SdlE4vAoVuNTVaeggZU0F+erNaanUqY9M58hiHhq5WPF6StIV+oG5V

EyEdWV0r9T5xmz0cZwo8rB6EWgTzsDWLava9+D510kiC4PRbzEaVc7CT3zWgnAHhT8EFwdcxlk4QMwxL

f0fqU4j3HnXzuCveagmFqhYohU1qUqilRjSHkvp7JD
IxQIJK2gmdJgOWgVaMkmGffqmVuGE1GCTFpYCHcPn0RqSoqWqlkHKkCWH7G9rrlDCLLdiwqvgdXFlOLl

NZXRw1qAUi0FZIhCf8Nspf3ZEsgI3V/YSZFvgDgcLGnm8gyRGz7LzXFbFcE7ZKPt/S/7Q3IDReSeA5aS

v3e+kTJ7Zy0q/qT3jsPiT8B94ms6hWDgxeDwZQq5EW
zsmmbYpWxQkpBmwHN83NphA7e5pdeOfX/tLwbQ8g1bPLMz995QA5QriWqMoGc6WY8Juz4qcbemsTu1ez

gVVhO+P4nh66e+efdqFb9AZkGMkvn0kXgXrXFV7Esz6YgvJPV5F1fEiyUPIeMaSEx1HxWUKQ93/qA14E

gP30I3jiy3x1pJWI7TUQxRebuuzxbTBahXcSEh3oN/
p0fZr/VoWLW0bMQ/KLviMihCpzXfEnjAoHEDIX8ax4cHk1NkSBF6eDGv1IfNb5/jsbOTfiA81+ImQdUB

jQI7OIJfU2YZ8U8TT85fPz4crz66DC83Ve5501joc/A3tLBvynUS2WZcULTTez5CzMai23UPvDM4DVZ1

pJlD020S21NSj0JJYB+xCS/hhwyoXg7oZhQD2bZ54B
CkBUhqudK4BwFDvgpTMWD0JyN49CT1R5fFSY65STYkEEan82ymoPtUMZ2uxGRpgFzfIzivoqRxkUvWAl

fFp/YbEbLZR4sMTdxE2OLhzLxhxOXDxO9+4e5QUEGh2jn+Rj9d1DB+/haNBFAjR5i0dqRXXS3l8mrgZ0

hDKTuwjiOB4YV8E8t1/XTlqQOtxFcXUJQx3wPTsqdj
WE7HKZ+yxcVZpKi+PxDl0yMWN4zBqoO7BKeHJCym0BorucTTN/o+E7NCFH/q2Pred4ymYmU5fRlMhxAI

6WDFm17G66p9wUt5TwwPgNlk/BoUBIISjTS/wy4+jL9ca5DUJ34tlxAlX2a2GmGwdtFn/AAd0aP7x9bn

eMda/jgkQUid8DZENShaCcIcT56y6C7yLcqApZjHQA
6ufLYl6vbUGlx0H/1n5J9UpPv40oHD3H1GuAgDQzw3K/jNYp7pysKSyS8dJ6zXa+My2iNzO0I7XcX6Nr

p0jfkNjRG45nTx0JW3Htu1p2ERAx2B0EZ1GkpCmLCLG+1kB4rJt9LBFFvqfyuMYB5E06C0EB2tB0DUti

gyd0uBoLgiWK8Rh0bVqvuH5AoZINvSZdPHPbDeHe8Q
aKDrZwYcTePTHDK/64f8p3U0eg1dGiAesUL39lneUX8zqQzWNkjWmBb2k/d9xFONH+4GDlsvdIMw5T3r

FQPSi4H6kyCzQGhLiODm5tmQ1Wln4glyz98aJBVp+TKB2qCEZsqsYlJKMxDvzBT1LsKeu/CJHBO3qWWG

QIDGL7CPrXY2zHYHgYxYFC5arXggq4G3sNXEJ66Hsg
thzTWDHN28kOzHhtfj6kfBUhEquoOaWQpk/T3JJ2P+EKBLwqn44f1wxIXRhdkE5Nd3+WkoaicZXM3JF5

gZtT7WM+HmQUoy56N5+NkZCuwZSPRkqvDWOocGsyyZgDewzPemNipGRalC1GzJekTqerhvGbN/es1mrt

Z6zxk5cRNAeE/JwYX9VsAPLErQDyDKoNoEDY5g1CSm
a9bpRyAsKY2WNpQY4u8FxPLT/OpOdNNteJ8d3xTcfyPGmCJWqeuWOzMCQhmQFYgddB3h+BDrLZjB/erR

fjLG7typgrYsGFksd8AYTWKcKUk28tuluXk1kSxWSZhXcAFZ1OkU9Bah0tIcpPQQDKH/osR5X0wQKnft

KPyUql3ql3WsC54cvx/4tPTUsq+/sZ/heGTNoHS+wY
7Tr3Yu3bVTfnPZl0pUX+eX/eQ6ONbs2nLTdfI7POGWCVDqFjzfmqyouRzCnSwjXPmmPLtDimx+V+1+nk

xAdwW2Ak7wU6nD2kh4oamxmMHSsbUh53kj9pbq8fjsvwbYm38g/E7rut68McHbjyJxkr9SVhwt9Uzrq3

+Pq1V50HX3h6eC358KObb7BjuNwjIeh7ocq8flVmg6
88ABPlRrVN8ZRYkmQFtrxujH7Twzlg7lH2n6+qB0rpVBEXGMts7s3859HVyy9IiZUH9zl2b9KC7UiKta

1OOW06rg4yjiNm94lLWM/skiqhK02FvbNbpR+9DXY1sAOVZwFA7D5WoeAXr0AVeYveIOvkxt7ANxibUG

5dOxwymyzVN3aYvZWgkzDo3/LTW2HHzTfuQrTo2J6g
LBebiZgpKzQ+0BMRyY90KLtfOeNvWX2ngxdeUeJFUOLu8d5SaZ+oA3YxIU5DwnZc5A6WZ7/NjwUQEWnK

+3rONCT2rhTSnAexqpiE+e5Q0YdNO5WVy6L0O8g/drs5dJBr6ajFaj0movvYUVJ4sX1raWOtAQAB+ct7

nAhIUA1qBl/KQcsA+YAsDBy9uUTkpcxJhLI6b1wPo3
err9QXQpq9W5Ebo+yqbN+CIoMSE7nOYrb1F8qF6QSeGxStdnIIPL6felcPWh6ZeDdueg7DwrAnpcD294

p1QeKrFPbKZRmcZsnWUb/XKfYGDc8l+ZXPDEckyRRZL+ncY4DrsaRQBTq1LK6DQTiR2JUVnwomV7fA+B

qyOJn42KEfo9ZdZbY9XQYcwSWWLwquY2kxB/Incqyx
RSTejysXvDCQcX+RUi4pD9Z1ffgQAICITcLFEeXjtVd7qSmjnHP+dRoin0R2upy6DQYSOwSLkaA43LLb

aCrGZJbLPrG22tMeqoALkQ4+DcRrLS2XIdY8pijq7mvjWNXnkTU0HP1/M3P38B1QZR+vTwkch5ZEhuh6

wkfesDzjEVK7yhgqP7/i8usZs3st1ObamcBKs7a9XQ
vvnpcWuSuIbKNxMO/dLEU250pN90AcWzxyvN9u6RAsR7z+rCn7ytWhfzDxqdi3ugT1NIhIaHbaNEQuSh

xNgNqjqH7iSZ6iHAMKOV9wO2mjwAQbBM3tipjiZwooIyySnm3Jvhu27/N3/Crescencio+pyi+oQCWEaQ5eROU

3ko3kFGQC/N7yv9E/TR/uGxrvhXw6bE689o5DsKXCD
[place]+kY6bg+PypzR03lIMy2w6eDkm+lsnUGu3SDdq/OhnBIebH5soTAWBMvUzvD66Dkn20bTss8e+ZDCN

3EBbtJmfuQ0s4iBzu5ipPOm19yhXuKJ8kOT6206vcDeoci4XPrWz5jmG7zozx2pEJ49ZHNtJZpHK8LqZ

QwZtc/cPwsRwvK7tUJ1PXiMy0Zxcxy8RklgNWfH8CJ
MIxd9BOGcvJ8VY8+XhgpkggJP71bxrUD+YWJdtjhnkGMfo5uu2TaXNxPAWwWqKWa+7Fqqj60BwSD5gyC

FKsH/W/FF51j/I+O0QX1JdT0Rf/pRsyzIV9yHYRdckC1tly/uM7/UVx3/Sq1OoVI7n3h+R1f0cLefbwW

9WJFJwwXWqeZU+xeCIGh4hholrTfmz7kfk6mhlyF1k
qajXC07j8mUqQOrk5L13L+tHHXIXwNBufKX5T4wAeOFLW9qLuIbaTrtWngst+m1qrYL0RwgPuF2bWpON

IvU1sZa21AfbqNNOt5x+8LjZtvp9SSxUYzKdTdTqIKZ7tUgdc8LG0fJiJs8vN2npku61+bdBB6jMplK4

pnLj6LKaIFwNBWDSnUYA/eHj9Gj7Gt59yxNjXJvaKT
Gj2z8JgEUaPlZHLy6s25mxjSqG3+PT56TNFI963eY4mzkxotj+p+u8ML1EmD6400gSOnO+hh5zEC4WFk

pSJjCrcxbWpp6kLnHvePzg/YieyDleQnDgm4AJZHhCBH3COOpewFjkLTfp3zR2x2Hd9z9uLRUVwFtd/O

2VRqWso1ZNdFXCQcAEfVw+7csdVzWu10ovFDuzIrMh
ghJQRsMOlKdrcEq+tmoehnu3R/tzVbxQwZZ4Vl5xbPOXKPvjvTcDD2zV4hn7h6K38gWbtNERYTL5A3c3

r9ryYVHoFLlQVr9fptEEQjZBleZQjOwNBRGnUbnouaNUHDU6tqyCdmKeVRT9prw2j5WLK99w7sNzl+qI

AhopIY4ui9io1OQciSm84sZhufqMSjt9Vqnn0BPDkp
SAcOBvXcPrno4tOe8c3YoKnVDHtWnauUsTJbC2Pu0vOsgypEQVRps0g81Xwllc6P8411U587CTl+EP/Q

4CS+i+fI/FQSbemH1OHZKPSyYG6xy5LeZd+f/yw3MGQeo4nxWxl/OGxjJbgxRC8/p6qQ2hJ+i3eptG3x

25Q9xbvwN6g1Us5wiPvTiT6/3vNbPLN7tvx379oNUM
8ZeBO796DXskM07Gps/XV09P5IodZY0mMhR6cz3nEfVxshu55Pl/Jt9+SPS+nUH+YX2kVD5EZ5DU3wkN

QqK4K9rWQKBFbK2kmBM3n7nm2szUHuDq01GyQn3WGhPsQc/C06LMUkfA4FXNWdefgQ0QEvGOdcgC7t5a

tBN35W6o+NVb80Z5WJ9I/2GMxBiDxMOeILMSCGTnvT
Z+09fqZWXXrxZytn5fOoUe0uLkdtScODhjUzGJDex/C9xvK0S2D2yr5xV1iIxYYpo26RVXDNMAugBEav

Wa2wrpICS5w6Q13RWuRdQi57RqlnP7i0aHfYWilmIbKAaD+HhiHjAxylax/MZvCBZ6wn+QQlbQN8iEpw

+Jy8SIPRUzyuXBydWFbpJ/qVp6FoxHILsOsHtHNQUn
TVCviOnJ2z4FaZbBS5jtr+1P+XTfo4wHyIU0uAJqGgSNe7FkRmSXLeqU7FE7Y3gbCNarMQd0+9CZWI+2

vineNgS9H6gWlGB+NfHdyp4lUHAD9kK1f/8ChILUohFKSTnWs8hBTrmKuSPEmdZOhUR7+T3cFDg70Sue

37EUsIDDh02o5M1O8GTm76hba6iQp8BVUeRte3/puf
06Sc8ma+6AX4M9fUiNHfzn6OneHzyU/aT9tkpJoT+RIisotP9FSCl9A1pe8t5Z1KK+hC9ilC7R76YVUT

rczwxQiqHz5FikJyvNHQlknp7P2KRoKigteSgWh6DpmkPVq1Uk68lnPm4BguXxIpSDjoFkoQKuvx+LbC

r3IkWbOnJQIGE96y9Iy/dHyQKX+DBSU0sB/OSF55We
PWqRl8Qxbn8U9DpCui4YKvvxBk7YHwcSIpjfV6Y9TBd2NYsZLUQCHCMbI0mEB0Nhzlbr4mRltwiS2QG8

jwQxvR0hdmseMBuhrdiMQcfX4NCaTvhLv3Lj4ngP0MbC9oMpfnLa7w4dO3Qx3DYsJyPYJtwiJCjLFRVv

qCG9RmCOxyMIDujx48Mh6BkfZszvYO7FGCjadzq54a
sK6yYa9kT0O8WbUhB38MpSsP1ONiQiNDg/CzzqSz3Y9JVxWoAai2eZd8ZJ0/WlhBPWAzq7/51WZCpR08

tnHGytOuLbZAd+s3QYO3r6+Gd7ATljR9TkftVi4dpYR7O1QqnjBN2GJbIB7V/NMoKqWNFdr9q/2Q4Bqf

ptZSvimQbUkXl71r8h75nxxC/pt6zRFV66Vuz/NT0a
UMokeKNZktGvjJneQl2YuxHjUdZ4EN5ndjVLL9Cex20eGQb+b++eOUkTAct2exC7zf+1sf7qspm89Re4

Aq3jLd2QtMal+IvK/kQnM0SdcL0kqSwBKqblCRTW6iazMmwNUO6Ye09xu7gz1oWg9XjN+kH43xp2AiUu

4KPDLQaGWS3mGk7hMGxoxqVsOFtsyJQIpAib0NQ6iH
8b92bJ7b5QoUfy7UlYu5HrKxUSZiIxPJUmGqLYSC3bzvQoeHDX3yM+cd9HaWZJk0wdgK694bfKX0hABc

jP1V99UP3qYH+GV+CDZje2X37ROxCrxe6j4eEgbewDTrfw6mc5DNnjqNH29PI6bVv8Ng7Ya0VaQHeRHy

wvmfWh92p0IItk9Bejwgu/Hn0Q3q6URMYeSOfohgxM
HZnED3/jJdMPGV/H9LGdUuiXgOoF3Nv4mMdyX3XC5F3KyIhq7eB1vlQNFzns27S7+s3koNU8/l7S2I3h

WFKsGIOHp13KhavU6zm0x+4lAKmFMRfGrKbI7t17QtS+gGgTH5O3CEB/3R9EILCAJaFpg075YEdNrBMc

hrZO74jk/FhYZSD6A+WzaePdqyhLC9ISm4ntAaWCAz
2PHUgXwFeYdnGfqWw0K7OHbzSJ0TIlnU513s1P8cfjMJzk9JHJd26rd7xJ4X5aBoLCoXRhWNyq1NVWEM

5uK9c70OI6wQimPhA+vVAmj4uFhlibZaNXXtyxAhnO9OtWm2mIOhNM0M0l3rj9peUB3JMiGJncyriwWE

P3viPx5lM6FQJXJaKAsn4MRfdos3GBRELWrxwxe12G
2Yn0r6T1v0HID7NglpAZy31yOu9OKyuue8xMs5N4DZqMjv4Nc6tEuVO5cd+WIxSKyiDWy5mdl82M16QT

9ciOtypX0v/JeE3WW+/4e4KZY65RH4G2Wk6gaz6m+yRG+Sb7TeyoWNqnHkzrjWyL2ER3IbH0gPI1Np3Z

Q2M6IcL77KMJzd1p3/Ey8xqeOerJsXUAnHRB5o3B7L
zMjLoB3LlVKs4DHvxJ0KitLI3YxLRpsVW9hiHD4uq2eASjt9U7hg7/ue2Yc4FINfi6gIzouMRc0KPWeu

/8u42Ydbil+XVQFjQlt0ROIpxycRkGuESyonOmBNJF006i7X+mpuG8IY0XtvHuFbw7CM6ct/79L4ti6E

475wOro91mI6AB6VpP5+htWnDnK1+hh4nIXMOof74B
GTQFZOleT0ebq+wGjDDFMbPJB7aDAhAFfEJm5wx+iFQQCpEHOGxp8UBBMQb6U4EkcwASYWY1PTGEMetM

6QKmzmBNCI0JxrnHqq48jOrWreMQh5kfLGWaADY/k9YRZNOBHGcitYBUmuSeHull8c0lDgo2XE8yM9l8

DOWyNRlmwrmGT9yuwiRdrRHt/qHK27t7r/s3RKSwCX
rUiNJGeLcJdUhy4m/8DR2W43ImQDd0ouqw6xyLxRGkjLfzIDQgdJ2di6orUjG+d8QGPTKIY/UR2ekAva

quT//SPPXbvnPYpngvzqu2InTc6NPXDWr4fcuj6c9txxmIZl3BscWM1MxGEAPS8i/fHU/pH6bf7Y/Cf+

C7GtnflMUNo7h603ZrkoFSD+TohGyTVau4F9vmKF8h
xUj1vErMiNgIpQsinb9KH5zHKTnmdmtUsbp28f4UdAaYL12jyJ522SLL7WwRhJwsBzpBNo0icrf9pDDF

fevaR1szJa+4IQa91FNHxrBJOTlt39YQNJ5skOKI+37NnEmBSUq3I6C+wu4jRk9OO4UD0TJ1iE9m3pmN

fRpU74ES6F0q+UxkNFJ4PLujku45yqBlArDul+ejtg
Y/7dChfuOblwmV0mk3tu1/I57pHRQN8IzGjObju3Rogq81L1oYzL6WJmonC4lFiEaYkpRh/9Jq63OS6s

6129wtyRK2xvkNHAPO8NnGUkEizvokA81nK2ynl8aMqGE6Mj6V878s67E93twQbAmQDKZ3dRfqVpvFbb

NifVADPUg4DhEontLm5kbF+NiLwH5v/b6xtvrC/8hH
67PnNWT+0UeaZ+2Ez3Og9GeFQbBd2y8IhZA+tV02rTrxU0ivd8amakZuG46U6mqwUSLpixp1Q5BcYvT6

PTB4/qs4Azf/5ks3q1pC1O8QMWVHowDg/jF64w7F5ITr+cg+WykpycLhbzIfU2v+kIQjZnjKe/ppzOsw

IjNZd7d1B4jSoZrtPmbLWBD+QwDelc1C5I4oYcxXbi
NXQrwd6Xiz/hj9iIysfooCkwoIxDIGKm+c3Csj1GdXLbSYbMvU/DQTmLF3zZp3Punf5g7k+VcBYaDn5x

znnbi05nBEearyStdxEh+rSdjC7GQ24zMH46pZNgweUskk8tzJD4/+A5IDCEE1l2xKLkvOCg/Qqecp9b

15I2/LNDftDzfSf+Y+f9lvNiF438WXw5zAc/egvNM9
bLC5cM9ySprEqGB2iha1yUp6i4WUiD2Km16+9yCSURA6Z5QjG2Vg0Q6yKqRZ5+0Mi12MrqZreghrByGk

MtH2FvU8FL/GMmH662GLH/8hFYkMRPzBT4mcHXYdSxNQEwc6aB19pGt0yUfXxPWOF4xTCBLdzEIc3iRI

c84ihmyEd0TEBKLiF2vlRhLm1prgxHHVkV9448XMgb
uTMVC1Uts1NuZKVKS2merMq93aM4tIF8iMri/bzU+5qqZhlyMpK/rTxFySxNRod8HMtLE6/XZHMLsBJG

Bd58/Gc9nlQqf7n2fkjDOXwt7E004xLYw8riJ16UiuFTS927WmK07xGvA4LWZkoOjWytJPYR6k2vmfZa

N/njMIpzCofne+Nj7Ovz+5xZq6pP9y7ebE2ttGvky6
zjkc53JalVv72Y/DAuiPfxrwxuo/CaH1k+vlp5FiAnOoJ6LDwBzCbZwo0hArs4OG0B6vMO89q/5YanF6

JzWE7XqunOLGydKEWl3PIxU+T7TFFc5rjCo2un7z0OfUiMBCq5XyeuyLJJBrp3tmZ09hY5KQEGMvnGII

QCQ6ufDM8K1MG8mnzcWhLWkrGY17Un8StLMo4iB7Ye
pgcdLwGS/vuELNfOCsqhn+xPxk9H5xIg5emyLCjYLaxRKF4ze2+7wGpq9gMwOoRy9cmqFAaZC2pew2wJ

5UZ4FyhxQbE48DzJO3U/Nue5IO6ryXzuHQMyAbS5HnyPXWfDnqmkyJGgpv5U/BoWmsjjwkhx6PmU++2V

hZSRYSZMVpe6SY5vH32WfOLxDwyyLem6vdxMAggEnd
vkZZ3m7m3sDj5hV1TqRe5tubDTbTiJWaQEZX7pCIWRwmMIGS+CsTTIhldcl75Y9BYrrrB08Gj2NtdMiD

ZVz4k1FgwRlzShHuQbsz4XYKp+aSDOfi38O2V955M36pcD25LB5RJIbiz/DGQYYo4XMN/xoq8YMJT+yn

DpkVnAtPXmcxOTYkU5XYZIzoTy+8SdRkreK+Y5ZgTW
Qn3DCTrCMMJ8zKypIqqofCq+03mG8nReA/zvpdh1L2cqWTk1GtG/vfK9/3qGG6reXkgne01nXrY3Vybv

S43cTcZkGWsgFZV7NtoW4F+B3ZhlaPmAh6DB7feo8jZ4bovFlQU8lbUhx9BMOmm8M0I+dG8v9WMGgjXz

1qGeslIhhwWFKlGu+1RyGdUubZdyAdnZvXI95bayO/
DqrrkAPQvM7aMT25y8pbEtZcp81sKpZDe8HocqtuT3ApzR67bGqfOCWwl9gX2EDafTmJHwhHpMg+iWHn

WJt6NFStSeVTRdN3+OWZ3Fnwq6uOuGnR2DKGKsK+aD1W3pCa7CEm7MWcWZVwOOKl/X6/fnqHausfm5Zr

+GAolVhC9zbhP0lBKc13uM5WGlSpGn+hwnqYkdo7pW
Tdy8oT+4JGy/iXy3C+VS6hlD0Ynob4ZK1Av3acDv7iC9/vMJG3m64DuGTnNVbb9uFqb6VFBi1lGLZ06O

/0X+cgPXyV5Mr7XrFHUeMcV82wLz36jWceG//vZTJhcuNuEmVo2byvctPKVULEprKacgFpMsHnM0ZQ4W

5BKTNUxmhr6gPR6qbEPmTS0zl/8fvTU8rIKML9b4x3
Zw95TqU56i/wCh/yVtKrPod2jDaXau21Gxb7K2DnmqjCsL2og6G/8vovdHOGZvuT9N6XW48Eac1R2uqR

xittAXXNg9IeEae7/+3JGzEkCaL3KsCk7DhkU0GezLcHaITGHSfVsHQgdzdg+52SqkKzSdWSYMuumf8O

iw5PMTyvTft2QCMys04jYxMzIaWGLaEdMaAdLo8GJw
vBy0pWDe4hjXX5jpQ23rypTec2Y2c2oaf9V7N+RODtV2PXgGnNTD8fgvXZI30ik8tYRI6/3o/6r7Nb+a

sNedwbXwdIEc+8KFr0vMHPcUwltJ/k9ilEemD9u0vw/3hj6kY7OdcXEvTrXDCQVi+UwjxU85MXnmD2od

nYqLv8dsABtIMDgcjJFyYPtMxVxBDlOj9r+Dp/tlu4
WP9Z+TFTmMkBLRPcHnkDQyuU/i16BEHuXsVlXptYJcm6IrlUaq+EzRh4LnpO/ZvjErybX2u1tJrlZZgA

6ZTGh6Zri69KSokxb4TRT2aZpqgEnBnJb5vLiutuw467gDdrvERyR9q9955LeKbc5Hj3MlzGDsu85zkP

r0H9vkD2dcRMrFzwuOar/ne6Udw5BXxEq9Qw4gpR9M
X7nZjgyOMt+r10h+KwD1xC8a3RANNPs9wbmwj8ehVhVBZuDYtL5DsWgvcX5mQWNgWqKckg6OoQhegb3x

9euqayi1nno9n4WsAJojMILq+BLFLSqH7jMOPQsht6HgfyIpIk3cpU7EHkOd2W5qxz6w2LDrh2MbOA9u

itWgB1LLHnOdE4wZo8vbrr5cwFz4sTD+CvKUuDk4HP
dxZiYYOLdT3J08abiEtaUjJBN7G8IxsaCoLwk4UB2cbIpX1xXjmFF7iFB1DDqjttxG+5xpHF8t5HZckF

zYrPu+qNsP9IbdSj5dhkzVcH0oTTZlyY07SBCW3Qn2HM7UNCReg/9GyN3dH66ak4PubbMfhPy9j0u9La

EsvBzBDTJWogGiQxn9FtNxku+E7a8ifjwww6GVpr7H
AEAUTkJGYbkj0/IsMCIjxWaWkTLBwO8TZj6T4QR8zmQuCKuAyo8wzW4HyvWUpaha4GfNRIElXr2u+PPI

U0qaJ+e/Y20Q9C8LHl95JK+fcaFO+gIvE5aKbnNOc+6l8FEwnvS3tMHTHFvepZZknXc5QlW7ieqNTUUS

lrfuBOPNGYKDk7nl4jziN200k0Gc8nkoiqYNX0yt6S
YiX+NmwxgH9ciDV8rRN8UO+NRmKnu3/LV1tlPnTcJvCi6sN8Nor1Gf3zCjHnr5vxSRheYS1A52VMqEbY

Ph/G0TC46ed9zwd6gpjK2uXk1INUFCoB9Qr0OoMaGkmPKKmyXg3Q9cwclUkJb47WgvLRtG2Q9qMyyqg+

nfbVIQh+10NB4DFU11OodttMguXbq++8C5vHJcbBbs
KCFDhRX54N4Xczqyv/zayGzee8ZpBg7XupIZxve2sA/NA1shKaUesNv7dLamniDi8Z8xELFtTxUDMjFT

qFHagxuRbhIqPS2NAOqzkIem11jID4uKAhAw+pgSn4bEyjk25PD1juhNmANUTJmlzJkG10ccv2pwZp4a

H46F/aBJv8iFUhrGantkkjq+KBr/73Sp+k/5c3JXkb
Kro/Mc98+XZFcDBT6KqwRbH9nOTy7d1ky+3qa7fa2o9qlHKw0FQrv8fBJwqlzNzxQBsqmbsqyaosfhrU

ZnkRr4j4UNf9zsOWJ60W3PC1cg7f1HPtqeuOB/5rX5m+ZH5dVzlA0d8Qx3I/rD1X+HInsUct+Up1sz13

I8DMHT9nXdoeW1n2hLCE5xG4Gvyd2xPCMSl2SV4BxS
n1pdoONWHP8V/ZlAoHs+2Ve4rfv7EWZw406yZztAF9SWb7PVuCoNYIuS0xYxWKuYxv7DzQyNgO2eWLqQ

wdzLCCKY9QF9RH4v7tQpPh2u+mFimpEifzy1e2k0aLnDFHwAEYR2jEoFIsf8BML9pje/zUzsCGIzdNXH

OzvzeqT7bXcDhYW+TPN+5JXgmR9r7StDzg9irLXn6W
M0/vtz/J9vmeT5Cy5pc1veNabxqdb/2iW806IPHsSFLFXTuPsjPsV5+EV0k8Pgc8e+8CU7rit4PjaLmQ

5TuDR4/HttpPl/GRsRvLaNxxMTaNltXqwGhNa6vhGj8oIAA14Cwo2FS4tGL00ivlKe3Ny959SFUgBFdw

LJNgh29hBMn17bglSlBoKh3y+rix8jTWz3QU/XbZRp
nzRqIP2rW7p9q9Df1NjneaOLTzo2e/7crg22zGTtOu4ku8NcJjYlV/N/lobqNAD47FHCbZ83MtMQPM32

KQoEkcM93iRQ8MA0Qr9icnIbeIGHagYsDQsyyvVCRhG9rYwtc2zM6hHg22oBN8B1PRYdNjkBXdzbm/Ak

cT80ijUqklx9rQOnT17YPHWatEyvX0IBTl4/r7wFWL
en+xwQZ3RPskp+D/ss7Vn8gKbh5HIVHsTP3C2ycSIPyknK43GxmCTBVIDu6NYATAHtfRR7qouETRZLL9

mUiDxtuLgTZdNIBAp0NYbfyg+wD5D+qEJmYVw+2JgFx1XTB3mzImJ1WyC+ovgm80Zga10iN8tQ5+vwCg

RV+4hujxJSC/0L0HBogURoJ/trcBjz0oqXmWzTLgVo
VENjv2s+XnVIkwOtRQjNsmH5vnT8ompCmPPLAOo4JNS03yHPtNSGcfP6vrBCLTlAooVqdBs1BEyEkeut

tR287H65thJQjGOb9l/WPPPKm5lNG8DE4SAfKyx+zSreNQ0gZpTuxlqWScsmDzv9UB2c+n0K9BuSrSW5

4Tc5rlGKvbNY02J/Vx+M5uulHl/dN6cDvgw+vBIYbe
VIUpz5xzVcTUGSYzuLC40Q8Rq1D2e4hw9WLlyMkrdwbwalgigHb3esr92lEEe4shcA1T9SxiJddh3T9V

Lxvd4MPBUESzmHTipW478xLPM6K3Oip7YF2BEBsoJ5t33ZmIOIPqKzatMGsZsvhpj3hgazPx+J6NOSA3

oWeEJTEdQj/qJzSGvhQWVgsK0G07r5BB4mnOuecor+
gd3sbphIqiqQy44GybPHVQ5KwROXvsq4bZ+EgZjQhR0GZ0qzwlq6IoLAE7rj7QyNVi5BB3AZ+bfdHZcp

sKOCKDsIaicFQd1l1eoupCpPOYXujhMVI9fbHXgEwd6pWgjjsD/fSskHE4QddWHrlBQRDHExiagqBbHB

MPghUEesYD2V9KiL5y9fmS92x20WnTQAC2NzX5hmke
byqey1eRSH8L6ZuDwJrGSNhDV3gqp1haBvjBa5cMLwQUvsmi+WqsT4D62u1LMyyyfsCoTbBoghkxhOiV

ncy6gUdGAYtBicoPe3khlBqDV1WJmXmLpih7ZrF3zFzbXcDDBUj87F+XiDnni3Ir3w4pQ8HD5Sc3J0F1

qwAAtAit3c1gcRblMi42iTHAtCtaYYPIARKTiIakwC
Iu2RvwsrqlUILJDpPmW77Dxf1MENpP1cUgbG379x14mIKK9IDnCR86QYv8wtL7wRMBlypDEnBPe5BcD6

QpbVoZ+S3Nxd3ffzvN4zAczV5kfzwo4Xl7GAtpPmXpKkPPK448OZl24LMkYbxk3N7VNfeePaLzricYcw

6+FXNu7c1GsCwMxg70RvvzI+0/pk8wf6wmzp3F6Acd
/yCS13FTlX8Hde8UGX9UgIuibAwHAqvmVx5WfuJC40X5NY3/i8AtNHIv3Apm21vsBaJ9YBefVsi01Z4F

mfjPqb8ESbezn0/kn2rtBmr0ZFrJNGRxzIv7RZvuhqQWkje7YSgb4mNbG92PvB8MwNRF6Vj1B+76Ripj

kGBITnKOSnjxKesTtLDJbSZ77fGknJLEo0i03hcrm5
IV40Msm1GCeuHBSxPqrNYIJv0Bnfv3EEvlDRkzcgvPlFnbO9jGwx1g/lk70C5aDZR+2ez5cGDpoyDlTr

/wfYws9zNtIBNkGtiREWP2SZYyYewECJc401G6miC9CjRojGvGvzcoJgnZUrQCnaIueU2LPcQm3DSXDL

zKOvSeSnKtZy5iKSM8/mGdXcyl9aQFwhv3FFpf4LwE
k3x7yeRyvVFXe4xltb4BsU1SWCC3stVuplxeywt678cRieHtd9De+nB/Gle0Nvp6S2yF6F4ataZS2y3B

TMBeZF95iO6C/C6l1joB8/9i5Fz8JtQowmnj825cLBEg4G9rodOo9+uTbvHeLpxiwc9qLNEq+RgGWbxp

2AILaCaydNBsH16ZeqLXoRl3DwW4Mzmso4FiYe1M2V
HXVmSm40hH+L/ftUo2xXKLPOyY9nEt1TeUhL0kQ2JaejfaNZARxs26sGLI2azbj0mlKEi21+Eaft15ra

1Hkf7bEY7piDtDvDm4Nz/0p+r3zevG9QX28hGancVaoPEJsZUxPde2hs92VaCnosQM/Sfc/DQtryJerV

rHqsoR/8cq+JGz7w2hFcNtAKFpWPsUnz6B7faGvo0a
9rMJiJuu9hr71H1R6EeyytkeInt9DPaHGSL4Jj7ySpOodTxyRLZ09xzsVQvH8f8ivvW76J5++lglRAfh

i1eu01J7dY1+rw035L2NWPALvS59JDlNYo8zAtEv5VIjXKnUvPs5AwWDgw5Ah018INKcNUvK9oBG2Jix

eiZNEuxTiabmZGKorphvr3xJ3O0qF0N/QXUCI5pidz
vhI9ey52OjRMjSGpb7y4P+GOvl+d9CIwvI8dArHwMTT7czrXtTPIcr8AsSDaC/OHnAAiXe8nhb5iT/3d

6mNspAKo5h6n1SIhrrkVbAdL+jf8WnfXJyBFDVZqwZgc6pfn6tQHQ8GVtV9/tmGSO1buDyCRrLwUmQTJ

NyQ2kwNzcLW2eXHVDC9x4ikx/bSAPK27scm5f1A03y
Ftdu+abJjTNxO2Bg0Wz3aLt6XQcAAAKdwwWpqbYAkFuZBjvquD4VQ2hrWeSYNgULMLMIv8P+bMeeeb+Z

46646zv22dL8nJQ5n8pwtBec1q6/WsfXsmD+HcvE3nYXKOZc5qfMKulwRojHz05KeGTAOEvDtfdfls5N

ysutR8HwtUNwRojR/ADG5hyZxN7OXuFq4ZZuvAwjhU
Z/k4zCE6cFo9wmrSR3GFYW6WaKCQHfQ9+WdXKGlivIdsUImpwCYJaGEwZa/Xf+zs1wSxwedVPH+aWIju

XsAt44WOWs0g0ET2kgDEznFeOPyjfK8FUfqKagjnObxe40nWmj5VGGD0RxYh2lopbg38VXYzwp9VEkoO

gcKfL87ETiveNckM28IIjKTWNX54zPDKyCZsvLlx+n
5Nx/A9okbryZPm07D042f01PjRn83sNQvWPFY4NHf0pVHbinVPF7NDirIE3H3lYTAOY3e2V55CB3IclG

8Nz736yUDdkUmcdJpCeN914avs5e4y10u+rWhG1jmeY11GOW4m91l2ZCbmjzx2r35izPTf0lt4iFhVuN

chCGP6wf7JlFNVeVc+aThfDQ44qOAHtK6hT6D4vqn+
XMLnw6YcelW25FpCrvNodiQMWs8hbesZVM7CHmrBlUDLJkRN3PHBv1HxD8Qa/z4ojG6hADwYwwPTqpzd

fLWIFy2ygiEh522FuPdVha/PbH2g2qzNmusM36ns7Z+vbP/p37rxFWfj/a8vN+bjEJcW6cn1Hc6rlkvF

f/FmDVE1yM/M9pFXqC5zML9lNLiv5aDxUnrZBkFOip
dMYg1g69FQrqatzrb/fmwmeWCyNq0NX+qHfZvn2T9i5evfjqRcyMRiECJr+KFcBaN2P+lO+SqOdxjP94

DXvN02ogdeKMMiUzbhZeeSFxt1gQmB5ukjHfHFwGJNJG/T/90L8X/yYsA9qgeH3yk7zeMvlfqmkyB8vZ

F5ASy1zNBB19+Rzhf2nK7EpcZZUK3gaQes3xBQdw/J
BuZj7pWVNEinfQPna4+xlz8Id/7HBXwZsEYtzlN0LlEf8dfEIIwKm5BEqLsG9Sx8YMSYZ0O+pK7qNyZZ

kXnjq5FkIPa08DLq8d2rodegj57E4t1huWk9J9tRhcR/uU4rTYc8ZHxFUDY76LjzMVng4BVsgIfRr7pR

088rMk8mEg1a1Q+7bL/Zdj4ORcamjIcVJGdC2sPdR9
DYCjfV7M7U5F+piEt1CYnw1BuhoRttcluMPUd/EUcdRxoWI51r8jJI5QU5DHAjBOSZBDnh/HvmnHX+tb

GxRzejCU9Ccni/C1h/dpCs2j3H6/0hHFQuwDtAM3hmvH+ebPv3/n64aoqoWCJnGGKnGNbHVeyY/82Fzf

VCGtfx92+n5lqahS+PCb9tqe7xLLcLzEyqyj7eAjz/
XxL0M4yYWOB43umAhS6REefvFP2QE8WJPvyPIoQz6P3b/R13/1OAvCh1tWPtWyIdw6mkPdYPVFFf6m8Q

TKyKFy8WGgcunYfSo95CZ6L8oS6y0G/PCcvurgm8EgY9BM7nFMIee4YvAX+ESLAW/oWWfwT2+W8fhCD/

CKKg/Ud/igXRygsZhkxF9phkJMsYRy/S5J4ZifxyBK
62k3AWoQeq2fR4UBI/ZSyEkqWeyjU2ec//o/P/Dm/DzTl60iPNyJXtLw+zWkNiSbV9iQ7c78gk9zF/dV

9K/ofZ+qamPwSzqq7i2Fu+R13lyiJ88rybiicFIspxEHQH+WtOzcmav9O20/70Ass28PA0zlDFLVmYoE

WiLJ7HYxhJbtKc4u/RIsr/k2/955vILQDadQZnyjiD
CPplCMct+K/J7o7+i1awJrYTd4wZWSF9tXpe9VW99CcCAp7neH327qvgG7cAFLNXtoYT1y+KnsUQ4tV/

Ubb/00Ste6Vb+P/7iPgceK7Bd0UE9BQttErRTcCE7ihlTea2HLh6TnP1ekNj11Q3Eu/5FtCnjfMPDdhq

zYks6R+hruds8GJ3iAyX6tYFgFSW9Qva6bly/CsAVR
vBjzg9VmWnktV/LS7U+ozZrUqqdEBJlvvfDQzK/5sjzWsX27F3Zl9Ou2RPu8uNmYitITH4ixMIQ1xK3F

1nnbb/nPa7Xl1V+ug5lk5mTgsHnDNTv72jzhdfjLh0I7t8W05+ETfQ/mW48e50gBftlzPMbNTZy082/+

PpLoIqZw7nulaB0QOiPw4jr4f2rvLuAOA6WN2KmNIW
/Arias/aIx2vyoQFtlvmp7yprTjkrmWU77+09Ig/+EJi0pRkAvioypOchDLROO7sLorH8RD+o7rXbS/vFN

Xander/B73l+UiqLssXnlRvMPRPdkicK4f0yWhrEQvLwMi3Qn7flUGZ/5+F03wAkhCPgxXr0PQEPx8Jp3aG

ID4J7I/97dOszaO8wNCYoGdBmRyNPCRvpcqpiCadhV
9xhWDnhsQRPydwff/OsaZX2lXtEcNzbpvDNNUa1tlKTtz1/2+/7s81gknQzfljji7B4BtaOTRX7L+VSU

tyX9GlfSUML66tnpi1jCTVSPotRJR4kU2X/JWmTjz6QCx78XG9c6Zu4cxfFQkqEowmAm9uVtxOMh80iw

rOfYS+f49+o0YG1Did28Ek29gQtTQR8NnhxGUFl0/l
Cp7u5ekGtgi+JjECS0imSmDo5rZrT8G0NxnzhesB4mH4lm5Ny555J7R4dPBCuu3PtAXW/8AYUSQqIzuf

rxujw9DnKac5K1aGKkNVDa/CQFzDHadeSwSNwEuGcyVL4e0thVW5H55oIPuqM+Zl1SfmYNmRM7U+Gc4v

se4Wj8EUaJfe0dGyeg3GGct85qnselHDHaP61DtyWd
oDVZiXukm9hCbxJ1wPlIu/UJs6YWrChDq4Ix/Pd5mSMLwJOGrKX75vQTssCr/tGJ7vV2XNpWf8G0EOjv

NckSdGyimxqX33zIrTYybd39iuQhDhU3JmuehFRxtVGGa4LmluXs8JsWtAWYJEcIanL9pb38Axway7SF

Jrz7TTic0aF197B4acsV8Ea+qrjcOSd2IkrS8leQrH
o2odHbpVofNka8JCoBBG0mMofkoiXCqMPjQF7YEoFehlxLlvaVNj9MotPv5TcjB9s2qxDzih6zz9gtUb

JWAVbXa/6TRkJobKNCtwb8RSQhDnfX5zpfe+ENZgt4LlPZ3Lv8pw2PKgcYLSiF3IsTDdyaEaSfBShfd2

agXCt/uQ4ElKlODDsQmadnxpp/ueqC7yJUxrjAHkLz
x3MoiuhK60rCHf4+GxBTLZsZXXoknlgP9TWxgbcejL9b9kjVDCbyeGH/be50R4Ar5fYSzSLRfl6db2lh

Lpdq5sMKeVxVcECw/Eg0/PeOlvAE5AD8IkIYRhbh86I0L698akeOO/jUfYgDwqYX7jJJ1T/rK5rqSTZx

ck8nv/Jtohsp06tW51Njv7KUZ/6inEb9uNSZ14K2iC
ZdhNpvRY8FC3xGz38j9WnxploBrMB3TgeXTnN/3205Hzhxo+BC57feHGJQOJ/ZSRCS8oRqGoSLToXk+4

UxH55XLqbEZLyF9r0F1850FRUSd4Q2SvAsk00ynubbUKAxG0VwE9kEdExCDhiw2IzgcDioUs2U6MlJAf

2dqxivuzXOs51DG0x+ZhyeUUduKZSHWsPdZWt1Vrx4
r+OVCNm5YTO40yBSh1u0yYCcPOs7YYd3hotNqwBzyf1FMZ8eqBudGk2EgKlK3VYWBnoywhXNnAy1naoN

ScqNryUwRBSpU5Wf0iVaiv1U3zu7zc3aHnqxhenY1VaETAhMHBtb2jneMm6S7uud/J4QNIQ4aRo+0Kp3

8I+eNsHzSH2XImsR4dhJi7z9UfKFEdOAR72TnWUlz+
8ZnfhHblpBIjlWikdjEsEFOUFh8UepGak+nUVJcipkNiQbDuXC9vds7FVFgU4QNyBdQdvt7VYFF4ojbY

jHJrfmCjW9cLvKVaHV9F6BOcxIrv4/BY4ePfy4d2I0giHA6eDBtCQGj+unUamTbKEO4cb4n3PAmFuzDB

gN7L0dJRIZtrQ4OaZ4uYbXU8ydwUyn9tDg12+4lFcY
a+s60VJGilkCOjjwOTsZW1W1ml+sEe3nksoH6D2178ZfoFP9UVDLb0tBQ5f/vTNiez14Y2TAE2GiKJ+K

yuc6enQdrWKMOrW54kHwteW2l7x7eJwRzy0hyFkUKhnvVRoooa4AA7HoTDKDrpDPnkWLETSqGcPrbby/

3+yoWsOwzacIL7GhF1cIqmS5JF0xHVjvIUffbfbiab
/IAkmJxwEdOxNSydLbJ4EVicCQ1xZ+WkreOvkIpFNNALsZ4SbQuM6ZywtPi9SJ+0u7j2mbefafOXCVoE

UikjZB6qX0jvghBRyyfdhsitBhaIXlgOKxgt4Ur9RGaYEQwzOENReX0N4gftZAoj1KOom3A7z4cIVVf2

7RWl5M16O4Z52OMAcHLkMpRrwHhW5K+vPx+7MK5rtT
5Y+sBqe8QxhqaRwmAD63g2kGRht5TV0N98th/mQfluf70vTW6220xndBB0RZ5Jyema41TU2rDgS5Jg6S

RXVOYO9XSKhaTVgd5wewvEpLOomyUpLnH5R7wMo5tKa0S0ZjSgPK6c9WDEuN7lJaaDnouHQmQJ34RZTd

EV8+Y9hw+iAnjGCSr7B5ppjM0VJ2ZYd2XLNkdsn6KB
kgc+DU0Gi4QCkIX6GC3vbyASNvb+KA3nzFsWPn5zCPYmpQCWhNz1LekCA/33mmtYDZ7rSplvIUnlMwzj

njdGoe+OcCItZwxuM4igBvMGtdBFfl/8Ij5zUcv4Q+cV8M1aacQXOCWvM6R8/5zs3K+li09m0sm5N2uw

gczs0j8hTekHVPO10fRSdhyMIDn/+RQyrspIAeypPl
1bf5potyQBzm4Ljf76aoTSE7OJ+BplDD80zXFkaqF/y4HcJ465myNWfDCPwEA56Se7JnLskdbUadBedx

2OVzr2J4Yb4AqXgwF2WVm8q0rZ4SKUdgXJrtfJ+31+HtgSWts3rjSgGSUI9ubE8D5em/TdnjFi7E2QkA

7yrTmgd1SbpG1V4bLXD9FDAGC8/VjrYaAOajMKtfIC
/nXH3JSJY233racQ5eehCXtp9THxs3JT+45sNLDhFTJGEsx32bspuiX0fQM/7nikRjxVoWqhuNlMZNXX

KRmqhpZFwLUu5W6xVwR3nye/bsW7vTcb3zog96NxwP3mroqOYGFjskBARfKtAVRADn1k4jREk5cloyKQ

iGGWrubl8VeLfBnbDf1x3jVn/ucIJcWQjxD9DAr8OI
0gJU19sCWBtO0c0HZeSaa0cE91WdIp5eHhUJ5fnn4DlWBqcfhjHUPjY+sEwmPDj97SsjEg/xxEKzqyxy

hOVq9kAhVpTCyVfV4x3Us2F+eQuZOImV2X3cRFmrfpAKMGRyAA/TIjtMfFUjLI7lTvzYK2BsxiNdtx61

doSHjNetBvdGa8PpXnhaLPpAcJ5YVJesYsnusrEO0M
cPCYEh9BpaCI6V6J7EGJPOp8Xfvk+ak81UD+4s78l+T5br0WSQ0Sz6/kHIA65CUcjvvogm5Djc7ux523

K+0oFj6FWn9Bs4Z5Th+ADD76nljUqL6+EUGh2XPNItGnLL4hnETwMAtZKuDPWjdWGkR5r50gqEPHmsKf

xztD6xSe7tp7YAdsTMIvai2Qu7nmwZfUUTxaHerK1c
Ao7gP4VN7FFy8EBUQg+Yf4Rb8wBlHwEGfMKGpnwxxccsJGxPav3MrJE1l9UshwFMcQwfMvdeRszPkp08

yHTI4wxyqb3fPspQdA09v9G2fyH3eMifmw3nRtBAWZkpdMb7GPoUgfPFarij1CO5XhKTV0h4fftNrux5

baaGpBO+qhAfEzy85U43s36SZs/BbwLtvdzBsZpw/v
wfM72RPRdWPXxDCSP7P+TiR3SiZLVtEQtuWgwIHf2GcArTB0c0aZ/JjMUtORvdBQpZ+ulcZzwBZc5LMj

SgiHNzZlirds6yjO82yKyioYzwk83ue/BAYDCUoLQum5vhWaCuv+pd3xVAaqar2kq/EXQob0bZdF6Kj/

NufRF3k2UmV6hz49lltMmDM6y4KLQGElaw0RlnxPr4
ETQGW4HhsQE/cKTyP8O2GSepGkMVsCew2EKCZ8kq90VlcpS9H1h62OkXIMNOlLmpsNT9iokjRerg5MSI

zqrdbySybg3EenLKkwAcanJ2JQkLXJMGcm+9TVuHgup/lSRKxVZXDFXdfmDReeZeVetAvvqRRSFK+Fx3

DF/FRJZmLLj6FpxzckvV51w5gYgtU20oqt2czJDYrQ
Hak9cr3pyGHmaKTVU8CqZN6qfBslBiGL/5HG+TmFYeCVPRms/ZO1f7TPauSJU6OPtUZoUA0yHo2pd28l

2Zlk02j2+A4d3BEZbqIHcf84lzoKv+dMMTAkn/1v8OL2BNXSi8nP24kDADbNh6qiH0afzRmpNo3cXRjw

ZkYSoRE+rmq1ZPkbwMhq7/fAwehMhMdTTQewcO/5/H
PC7jyI60gDqehhI8e5ZJu+HKh3hlN9loVvdxCcu4EdcTe2qJk20vAiZi+sUttlL8xzsm+En2/5/Oh3o3

3lH3+6LU/6ToDyiWxcnf62U/N3yqUBC98FfogVXq9BLi8i6YS9xBaD47Tu/dmP46/tOoDwiJ+ZwTGP7u

Twrz9CktDi95NgTaV9BDkXXnZDFWJrPennMxlmq1cQ
dIW2Pdlp7catykl0mkz9gpnbTIXQ7AsqBmJNdRsomtkersaLk+LdJrKwJGSCfgnS7lv2bgeEMatF4F32

5UO9jpcRHPFLQOojO7Xx0F0EWULWc4ZsliWv2YffOnBF+FoTnCXsR+XZoDsQT9hB1zLxe2ApCilWmNs3

PR8bg6T5Dl51uij/lDNxcoHlB+XcOxDtPvh2q1Bu4G
eaPOAKL4VzhJBt+0aehl+PrrleYWL1HhLp1bpZeLWLrVvK4SPnkgxR8Ow4W78+eiUsGSmsC54GS8RKlk

o9orKI2vU18fOzFXgnfIWS2pZHDemKgh+h8XRXvdAqj0Rgv8ET+XAty31gi0pfbqnb3me88LmRTZJAHH

8k+N6GheOKoo6fiTMtJb3bovNdQdDozMQ6Bmws0NuG
y6h7+NejROOVkE3KhJHxg/UPCa8NvmYohT/QzPeyh17ksqQvrPoudQ9imDBSjRmJYSBke5+z4c/grGt+

VilN1pdDGyF5W2V2e9xw52sDcn2D8t9puokNnZPwctXUk7Ql1qfqBZVGArnS3jjC+0fgntNmXRf3zEm1

bTqmMNEF+ifV9M+tu8yF4KVncaRgLyP6MOr97xdEku
KEmgmo9VsSJr54GvqZBrmvez2d0Za7IojUVFC0Kf8P+0inUDxFEM3w7U5gk3xBKRDB6zDB6z5VYTjJ+t

YkTtvokVX18kjWHek2WaTkgORN089FSK+G9GfiNqsPm5hPBq3C5ONtFbrs0pJpdfxPNsD5mozn0MI4qC

hNVZ29pxgw+fwgmwv8Eld9rPqoHV9FHaGLtQyFPv64
XJCaFNr/XufJK/dYK/I3OtC7BgKEfj5ippC/rL9ZGr0Dlrk7VOLZ+/VxFdeNskFXE+Fmgc2TcqZ+4/3x

3CZqQlJKxiQhiMWIvUZniu7UpWj5oJgg5si8KRyTOAb9R5Lrzqh8RagluWlip4BTIJx7LsY9KrS0PVN8

5RUkhELpmcss91rRFVm+BKTuls0kkia9uPAHTJ2cF4
HZGQJzdepamyihYFTH4tk4yjh9qcmJuMQZGmmEOHP12DuAv59QRr21z94GbAan4KmopekhdsyAV3x1Bh

MB2Hgfv+JHt7m6uOhY/tu4a95R3QCRdaKOyPdHiaPBw+RaZY76c1tXoAiNBRjaQMAOWT047bvn1RG6m1

6fnhEKQauhTggEb7vODgFfhwrWu6boF6cJgV/lmiWq
aeuYiLdQTkgQ/R/vyeEqvZeE5UviYeD3SI+Y8wEq4pXnQi2NOIkkQoDJOmuJl+/z6KzJzyxhWLtBP9Nu

kNV0bS+QD4biYpzFOhAVmPJTcDcH0/YkvQEpuf9VMrvZB3EyMbat8NB5j+1roFFOhIkZb/jUQ/7l+WUr

lZO7njH3/bxgVFV9SfCrw1lVx544s11jhdySiCm1Y7
3oF+y3lrujPMCPxXLzfN1QA48214J7qZAjFSHZ46zyy/hnZBPGZMItMyC3J5v/OFkXLvJuUge+TyWkdu

qA9hHm9CbRBkQZRHMuhsWWsnKMHtaNVJDYHjn7K6Gkml0fQ2i/q2Bc8vturpqU8mIlpsEQZggwEB/3E/

YbtDR+Stsm7FP6HQW9eD0C9YNYkuJvRiecOqTvhj1/
zGleMZwUKa5C0Qt0w++nunx+mTGjMW2VNt5Q16pnzONyAPrwSyKqqbqqnVYw7FyxEL7UzhyFSxlIqEj+

47kQKzHYzCjBQpbny/+6/GnCyf4FvWGBR9ThVQo8+Iyj/nZ9dzLpJT5fNMxb9BpdprjgoCNhrSg1Pg+E

/JES1NsFi4F6IeNa+coJDBUDrdNO4pKNzNAhrKkg0I
LLy2GcDbai+/o/5PmAh5C0saTz7U1B/xYvfNxqWCA5nc8XNVIxHmeHLg2dbJ+oLWTetJem+0adVN+7fo

7+PPxlxiLxU5ehyfrqkPecJPZ0fjoJVt6pPUSYi3P5PmZTH2MzGCc7xJk7IfTMNbxADrn2mwHNYbuKS+

PkwMOFglgZQiFGo1piDSV0l1NL+pZ7lRBV2WS179WC
I/Tg8NnlsTcem+1sTSDpGiMPYjlkmQJtkrQDa/Oongpb3nXfTOxmIoCoC08r/rEGw5GdXg0qJmKJ

U42GrWpkR9GOsBbLghbbLVC65SWCzLYo1ue1KYTCQ6MXkj2y7jjywHy/6+JdvCZtKx3VK1K2xLOhs2QF

9aBJdGZJ308gNYWGC/0deE+PK3HTTdwbtL+NDtc5O1
gWy+gsGZZujMrIQc8lRyAM9woLNY+SraAJHvrwbozq5zi5SPyapUBhKQ9kWqdmwcPy1vBdFkH/fqlucc

yFl4UoOHOpeUbdWxc7zSL/ZfGqnqXCSdevXnfFJnceHGBnWqHGKQUiZRQLr1+avviahIQY1X+qJT5uBh

pTMjVSS/NHOzgDN1gOjtFuH4WxAHWi8hY21xserp45
sx1+5CvtvRnpvLgcVIswZh2CH+f2prW1wHHVqqFXaDAC7WNrDBkcjwctaTZnmb3o5K1hH+2NiufRp4f2

WGqpPBhxBCqB1gKpZPgClQCMzbHTRsD5saCcD+8ajHStmj9wreUy6hZ2XNUdx+PFM4WTIapxByVNRpSw

nmuybmBvdxTg617JT5GK0zgOJFq8p1NXCorFGeP+lA
57rBU7FSmGyq9yBZnUzX77nS7T5gQQRnjonT2LDMDW8g1iUpMwUQ+QbK4keDdNdD8U4x2sRA6+vvW4Nr

0oIV0LXA9x4nZt5M/Lm2QmezyPH7C0xw0bwRiL3pKZe8bpuyV/Z8E5dh2tQj/tOvGpp2StSstIgqvUu1

Yyiq2fhVAkDJCMJB7B7spqzZsswcId5xfgfjCpQoLe
ZxEJLpeWE9NLWb7cKr3VZqziWdfoXQM819k3m5+JH4V/4OZ3T1sVBxW4frJVhaUVtSCBy8JludeKJq9H

/ZtYIpK2I4cvG8h9dS3r+lIdwurl68WDxVtNDsNvD8EmT0HGUMVlITTRYcPIAnfCijCxIK1A4InIWBVc

JnQugTcS0RpZdh/v88mue3mXu03AdoemgopExvjIuZ
ltGVxHS4n5gB3j2+z3TxU2NKdH8J2afxe/8d/wXhgPO2/TWp0rh4TTbXvXhiiezA3wADlXa2GySHeaoD

wS4G+7KdCnL4+h0k0C6khXYtWpOIbRNu665RzCeyiz95+9uIQMjDGrU0VNhN6cnrgh0evFQvz/BlwYH2

ceAcS71e5vSEm3A9NBL8TtchCUt84ZNd6rB7lSPN/G
Gqg2czQbXyMJfwyFRaWfOIxDmTRA6oQ7LLtN/DQzy3VfhmUVb2+57lw6W3AfRGG874d9FgSd28LUoI9t

IEoizqRj9hJjE54mbv5GYvo4g6spDrMut11kQZfRVeIUo0qHPk7NEJF/SJgss8x3BQeu1KW3mIxKCgHR

Z1c8a+SiMt2RN3noMrKH1hk/HlTORzcSXCVGlw8l3Z
6muFobnPYfXnEyGBPe2pL3vJB37W8PexHDX0UFy5MPmFNxJxWlmcU9kdHw9R1ZntceGHsyAoVOeHX05s

e4egbH6XtvnrzIwfgOPBGqfY75mY4zMTnyIOlaRHaUizDgzb4XUDwRk5LSxSXyGTWnzwoetDiVWZ+o6b

LunMPtIEzXinheQDSwswKhHTkslTx2fmuLe3BsbUUp
KGaOMtvbByw0NOtNOeFb8GHRLf+6ru4Xeve59iv5Z9v94cRdcKVUWM0icn5oQWUNeDaVnd6E93kItcQr

Dcq2SzdrSJKMk9EtidSP4t27QcqGOyOJMKpy2ZhHakRcYsXiy+9fXXgZhLEra2IOLqeWBvCn6G7VOyqj

o7hdmb/e+Ym4vqKcnO5wRfuiOH7Jg9HlCB/DhuBTgW
s+dJ3PC1PYA2TtPceaiPfLQi7fSP+qjfrNdd+VstHENDWiGzC2z85Uz/dFsjFbI/LmKIMdveeqV7qerU

IaJB+BHcb0M2nsiB0hRNfYZtYvtDDEJHGp6dQn9YDJdjCAqvGZiu/3N/ahJWTZ4HquZYfZabGYqHjzvK

8qi/Z1UfP3i8hTjPfj9z1LOZpdlwMD69x4BvFwgmoH
a/SW6r116GSV46V7siNBqxwTNG/QtXCvg0fd6l9WdBURVD9XLaiPgGPHI+5n8ZDCqKQ8bthSmT0SkkNT

RaS79XZsk+Y2krB3SN6f67KIYr87SJG5aHMmRvUUWvAhjAHjL/vN0ZMEfjxuz6egAhqk4dnADyBic8vM

v52zzJP1ECl7/s3jcbc9afmYyVUqGW0EgBtRqExsVh
GfPFAVErABqpKF1OvcxUnd/CmAS80qbQV4LEPu3MQtO+1Ln1YIAbBIKTGtFG/QSyDAkqM2X78CEshIk2

j/Ef1VEIt9/U9CsS93CATSU9LtNtkMU0VLysygBXpuCQ49diGbBaeYfW5NCz8rEuU00NXbGbxeRcELgl

wOaCYPO/lORc+Ttjyc92JxHp7dMFZjSPox6hWOKoim
67EACDiNo5lJ/dszqTeGFNEzANHxOB0d9kwnM+Zb2BmTRrnkd1ysxi2asBpFFPSDJa6R8+8uTk/WgF/M

e+TFoISNijzIx3tcUaiFxCpSprC1eh6Biiu+1ZOON4KrFcVzsvCwG+S6DAAhjseKn2QeXupPF/GBfGKl

Kpw90JVk3gbpWtORMEWsJUx2b60E9UR5k6ebM7IYrM
SIWJ3BnQ0DH7eRxyb+azCrTGKwGbGQefpgcZypAbXPrjCLZ7zQq+ohy9hJMa393Q8NBLL+IZ51XS6YUv

yJPR8Z9vn1VT/Bi/ZecKVMIADXzQ+85qUC6P4akddisyVj+bNQWm6JStqAnpF3O26mqFSMGvJ939onQ9

AYu2U7D+jig8UoEhJcYMPCS8uX18vT8BsP28UlLLna
ksDJOvapQbHfmt/Kr9wwFcr5UzqUzrgVv3/9A6pRrzzMi1D267kHeFVA92pIUQu5UkXZUz1pDVafSSqB

K3YYFPvyCO17tZc+0Ky5DrDEtcX4vvKaCfCiy83wsPVdFjddGK9wV4ZUY8wWOUb1jF8QZQbY942X0/bn

nWU1H/mgBjgVN3jrs+ztK8IfbmMWy8nxFXMq+dbgSe
BDx7N4Hob5oyCrhuQc1g0gxbR8wJ1zK8LxF6QnM2vzKQtX4lzwkXZ26EgJNjzWwULsw6BpqaiMEfntly

LzGfsu96PZwuPN9XXyMwyXN0SjsSinXiRbWW7jRMOOuqEXCp+4ezzRwzw7pdy7i0cPksJ0I8BYkz/ny3

WaSsjy41rdGyyioN6Qizp3Anj88emTyYF6VX7GChHt
w5IY66XnV1JIo3bvUY86YiBVGIJ0uohhi7+0/TpeH6fc5vl9bB/MuJtPjwYNwhGYReJe0tvkB+AFWg6G

yc0QjQJ7Iba1P9BgLDTni/+fpQJb7fnWiA1av2iEZEEGoBVi6lioL34d1+LbMzrX5yOA+AHPM/1OPVYL

HT1qVW8553GvSskQhFDZ+ucdlzQC/QRyjVVoTzZ3RT
RCrJ7DHB6i3kSe4T51FP/GHAANOkqGpvDh5YiM+Hn1uQDun21UXRlL466OLf/ppNwM7+f6akLrOi7aN2

8ZIVsjLH2aBdTXkIQl5Hf4Xgz0m7Jct/4hbAXIL8nxrka+A05bIOmeeEm+uq+DwYYJSe5pXcVwpslkso

h1Wfk1neV1ec9hIJKC/g2hEbCpuYoGg+zNp9BF5nKn
/JptTfuaxLTSxPxDWxCiE/1CQpfb9QIbuOTb/FONrdIH2Y8at9ThQOoXpVZ2EtJik7zW3t/yeWZ2qBiZ

aZxGAolcth43vBFSs7zSc8LS8kk/OVhzLgcnBFJCe6FZH+vNkUzNrO+mWi3Su06gwXGg9HYxB6tBpwMu

h8vlz1ukFhDu5wHUq/aA7T0Oi0eN0TmCsXcy4GhiNK
QsRoYLn2h8IMQr3S2iw6ILLXYOv6N4vOKz53DVtLmgyyfQBo/7Xt4pC6+aSae1dPwxVduOwmK2sThe+B

LyqE+k6Z4NxwLLuj4MKwxvjoatT5NlE53vY9LEEHTbpU8EhUn0Cy/5vvOIH1O4EM34WFiyki4Hr0mK2q

j9jtu2X6LuCc/jAwZBLffN6xdH+QVpjbAIBVXZbWO9
Cs1QiLvXqjwYFTlB86uW/rcVr1QsjcP/QA1dyleq1h0QQzoTSOGSEgzX8pXMpvqkTMWy5xWF15t0rkwP

FUMuIjHml6bV355w4lkhx9uDJMqiZ1J1mkXVgvqXTJgcwZ5CELIrVJACXtb/HRCIz2PVzaEQsSaCduXm

zDQmy6JKyNa7Zo3/aLFB4NsFV73Vd8lwQ+q2NKkSKQ
ce+bGQXuS2219Tt+bSRH7UZjxpx+nYKEAZbmfBgQaKxpE4517fP+3p/S2+f5pCOg4CHCpuyl7ex5lyeJ

Y3L6QMpeCS8FNXn055f0eK9DIY++xBDjk6/PNG89f2oRzbVWpSg/gAJjrNWDjaEvC63YAb817ybnRD3E

aHd8AB6FgKLNtkYwiRyjZBMblg3nZDFuXNuAyguUz+
g44sDJN2Lvp0yEzILLV9Yfpro2XPhVD2hw5gjDSXQq8ZMGcgPxStK/DMx7OLg9Wwqi/sSahSz8xOMpzt

R67Viy2aj9Hhl7kWj4f8oEoTJxWLZDKqlbxO6sVw1lkO6YK2uv9rUdE6pz7qSoqCuZ8r8c3Jv5fKxO+H

IF5N+QEEg4b7DTdsUm7NAyFyPygApMqnFC/gk8DLK+
8Cz9eFxr51J8q1k7TPUr64cBGegfg3Zp72sV74Sjh2ugL0Hah9nVL1/UPeDgAWMNUYAlazjS8fpOnCTT

jBUlAiZYwGhHENZMT+Afgy1GWMjshbQJRAX9hGpof97BVba8K7MM8eyt8k2iQSeUTZpWbOtshOenCq3p

zXH/frszyZj31Fl/utMIOneVWUoNLE2HUsfxpC/jbO
/mf5Lb4d6Dk/IXhmOjqsVLzCiVwpezSC2hoG/sO/BoqWvUs4Kd+Mhj2J4TwC9d+NhpC05YYUnTZx6tf1

TZDGurQHEWIzhym35zNuhsuBGWhfIEt9lP53ooffChfLlYUZMa2G2lkbhzS63N2hPPrFO2KU7svrO/GM

evRlMAtpiy9Y9uHizBnCf449+UvnJpyFa6lDnUfwIX
K7W8oUEusqF1eLR836w2YZChFm+OaE91pcmmktOKGM8h0Oale77F8NCx6ossGomwqG36fmHnbZmPWl/I

XUFjX4xntxG4Wf8y6CzrNF1TDGcqRFzyib1FxmR96e8ZjktkR3xci8OcrKG+ajUIq7LFLIGt5SK9Y9hM

elhHyEJrJZdIV0/CHSjkpW6XQ3aFLolnR/0ggTDXfu
shVwqCrEGzCT79z1BgBi0k8NImEkFIxbJTz7P47ynyjY6COz4V5MtFidskQGqRWZfonZyxDzXWm+a20g

4Lsuu9e1pl5WQN22RgnXHro09q+luynVYC1BqL4fsv9xBhphVsINJTbDPaBI/QQwSbG3Z47rRL5Tsw4l

1rCW+g5nR8viI0RY2CLG7TQucZJhcXK+ebCK5KAAH+
1OaKmY/YSPHuIc1vwI1j2E+P01u6oosHCtZf1shmJOCObeUP60Nw5WdKvhgGWwep8G1GG8kxYrbZguoH

dK5ExS7NfrD9k0goaU5YleiGII7H6co1n0uuousUkk+8z90UaSG7WU96PZIJ8gDJNCm1Z2HpT3lZUCCT

ogi9iXDfeJAaPM5g2yiIxFjbfzANhFVdkf5dLhvfhA
W6Xc95dbX4z2ZB3IeJwd1t5CuplAakroG6my4EVY5iZOTFQeNzJsICYZ+scgOPzKVqPjZizAUV/axZYd

70mQihxHRMxjDIBprADmZ3mfxkApg9kCvEJokxaQWZaMYMe8nPBLWndf0JHAnqndooKyMofixOAt72yZ

EZWqDJJ6DObltWUakNH8fVPXyPzAO6X6u1Wy7zYvH7
JDiHExpGcwp9nqP4kYGZf/FocFetv3cyXcYg1wEG9bpLHzSnhoKt3MFxphM4sxDw4WddgbM/jztodxY7

YpWp3x2gjytezc5VgaTYceUYHLZfmQy9GtF2dKOIoKvfohU6TVIwXIKBC6qWk5Ah1nefn4oGfGQo/mob

XkyNQDxtf3SsYImmtd/jQxc53ZgLFgf6ru845yu/VA
O6JiFm1y5P+Mu8/cMnuY7/wfilaWFTsQrCOkWr8inu0KgYqBLe0sueuQ5ovjJfK+SXPbW7dEds13X7y7

5RnOtaOTT8GfLFTJoBuyLGZP+qTeRyB/QwrucdUoBQJCJbkKsJ3NvsrWG6kRgGR7qd2k6BJ0f2oLcCap

qkTJ1g+MAgPqcNgV3kpUfw/Q4cMZMMcClfn5oSU0Dq
g4K68a5HPDga6CR++/fUNXpD/LtNlMUaO8vBIWJteLhAqZ6G0ZoUvHjyAgE/OORifS9qI/6L5G4+YjRr

1Utwv4fsFMGcSKXrKX7x59d4hEKywUh+9ykSVx2iYcvGS2ROMTZ16LDWZ0QYIc1hBqyePVCXV50anj0g

VznQoO1QFeE9ezIy+psfNk/KLvKQ39vMiUx3J1tgKj
O6F1ljgzkp2apdjccP7/M32hkpcrKSv8sFjNBoBrY54lCsZIYP/908xvyKkvHw/6NRw2lS92Xc+6S8qy

2NMA2HbdL+5wP2180VuaaMn6blXMtI6n7y8x1Wp8S/bRkzHa46D2YwZ6k1yctG0gbwxxomcnDKitknoh

XFiJmbVJ2JZUby0sa/4n6vnEuk5Cx6xT0qDeUP888f
m4UxpRTeqxKxCUJ4dSuOIrBLPkj7qz/YDDvDSNgTnb99PE3sboAOQcBa8jZqAYqtaE80F2GND0Ofof05

vBzd9yLDdwaa8t8Bu2gC5SeghXOJBsFgY4xcLAQztpWtcAqedvehlZNDrtnU6MIQ8iS6/hmxnLUZoJNC

2cOZPf008N5yaqMGxw0kGbRyAFz4WxKwQrpNbcM7pz
gxUaAaQ8y5pLLmHfYWTpvM8/tcoNd79OZHzhnAbGZ5jnrgg6SdUAixYpT0kJ+4YGpZTkfwsgWXuJPo+u

NCimEHr7teqUM9Aly+2JRvORxKn5vvJUIdJbv9F7yt3jLMXg0rKHNbJKtm/5fBac+zd4fMJoWV+EiT0X

bjRyoeq7MntHZJeWp0T1A0UBfu/klYNbhiB8Mv+0CB
yDgYHoHiiC0rvWG06mgaKhBBlpcPAjkYkBb21MywLC/z89a95Um1DKUPL3Rqn1OugXWDWeYhoeWSL0e7

aorMljHstdVWvJsnLWFWVnDCD5DzQMGPLzrSw77MOY1JeDVDq5ALisNq0A6LiwK7LBVV9I+S2Ob3kg1n

68TNQiV/DKBoi131NSi4oswgNb9vtYtNiOJICJ7lVl
1AJ+dytup4J/9Z4Vo/K5PEutNxFXL7H17D9+pGiWIQd4zCJHtMRHvc6OTX4KpaeE6O9vnxXEIJpByPd0

Qb5hkt+zqqXjE5Xsog84ovmYwmLawk0oDZ/Wpa7slyGg4Zpx9x3NJrx23sNST8K7uY5cApMPBLqkyqW7

UlUgsUtrkyFXtFpfiQOqlZZ86rY3ElN3neS/lv4OUv
muRRjmIFZCCtzc0DVP6s1Txu+uZ99VNrzfy27/vsVOOPM6FABfkWWNmT+urt7Bbp+OT5q4LlZz7GMsvj

fJ6qz2LpvUBqv98tE2ijeDeapy0R+0qN9VM2DUxf53DLUFWYsIbaaXApFesZ8+KAIDhabxnarWBZ8AcS

Winsome/N4gtd5lI1xN8EDM+2sBaChHluzdBWQ1rdxFPct
/mxCzwYaG+oeW88ncpTQ9rmf2DqlfduNb3Q9WBDW5jP7Olqb7FjOG1kRhEzZ8hFzc78F3DBpLMGKpoNy

KN+QGunpPU41EsN2P2MNI+Jc5EtG9wC/WX9F9KnApnNSqX0OIz0rzRVTax30aJkdPD+g1aN8oTxwJOCk

zBREGO3hDGYDBsVCz0v2H7BUM/Pa+CxwnnlcoPNuHn
tOha21PFH9u9btnRmtm/KXjXStnx+f0vjcM8gdRQlTdznRk9KU66z8Rb5IM864/Stdc2l189Q2H1Aye5

cyJ2IX0zI6V4dr2+b8/zD6x0QktVNR5pVesJtztTDYs/B0p+I+4FoC4BLXUqMuwaGtnScpB5AlHdX0go

6pDajJhDl/Rbb2bahvt2NKZx27wUhD5Q9r7eHqvHAv
P8n01lxMKbXotsISKZ9eUfh8lzUfQmjsRrqPZ7b177z9Cm2WBJ9Gf8V2LYsIrTczpOyQPYMQxYM25N3t

+9o7R0IZf9YSDSsOw4Jb/YZXzrc8TXKc/MCULlqtcQUgHO2nPz/NL07gl62Ul/26zyCt8pCHEf4Nf0HX

Dw8jjoAPFqB6ko0xWTip0gOBYjWKBGrsYDuGeTErSv
iz5Xduq7kdkqR5eAdhF8tLo5sO0sniBMNO+H1kSf0Wm/13s132UaBf2ewWE8ASyABoXD0dL4YfD03jvZ

MfML5gdjpoq7Buu6EzRIXkVAodFqw3pGEVl+gagRASg9STKpuyM/WNu2g1IFdZ6+M47S9gDCnnR1H3l3

XHiSAmCpQtWdNqd714PJFx/5r74vkuQpgKROmaSNm1
oAd6S9mXrPihinDSbb+hAPg6GgwAXEchczhBI7RR/OwtX+J841T9iZw/rQENidYKeBK2Boqe1+c0ynrA

Cjy8nFC+PrC9DicGDEs7KjlN72rYZ03PVd30lAGW81qmOLTrakAh6WtwkHVVl0m8mRBJV1+VRNTrV/Rq

0GKCzSbubizb0MEd3XNTYsiJyrWyDGQo41rLdwLlyF
9WyjksJpnu6WMdAK5xUkv8TwYbFZuKIYcau7zuX92kN0gs6aiZvuf6oj0yWCdWMjnxjoV1fpmNaflCvK

oHLKuB2f3AI5x904Q0d3uuQqLtbWgUOATPQcmZ+qiZ0z/D6xsLmZeHu9+dfT4jiiGWQeZv9ONQStBAmr

bio7QkvPZ6ELEqwHx6KRXQPQtnF8FuYoH4Jxg4Evu1
lC0Bbve8Wp3Bppkv7EbaImWNSuFrUfIXr06yYT8Ss+j2F6Ot18udOhczlKxrA+aeOwicM1mPLxD+5B+q

iIGSD2DYeWqMVfdd+sUDVx662+d11TegbSq2MoSAHnnH8wzkfEYaTBnbTEGXlY0QX8RyCIlphkmUIOSc

7P4dTzSgMn4GQ21katgKAjfaRg77OpKOiv82kJd0Fr
uXASFawSGja2fZkZP6AoAMdvShiHXoBj34lWl/mqc+aAVSWMOSE3g2fvquTyHHjpUNYcVPbZAA8mrUOJ

8vh38azyGTgidNDBYXxeVZhdd4Z2nayUyU0Aw1foYlKuUN61Ecd0f/qkawiALt6JleF+cCmfJ76uKiR1

r6cNVNPwYf3BYOh4NGxVI4OfC9Rw8YqilxzyFrX9kP
wG5bWNneJ4eha+orctVMpsvrBvDvplHcvZHqry13HHzes8Grag4BrO6XpvpWz2HDzrmjUlu+c15mz/Xm

uoeNO5qgVZ1j3ImW0Gq6E1vTOmIQAgEeiJAwonLWRKQaA271eNtDAlONXxkdhw2WjlmRt9mTyN06kvT2

ptHTY+ETyqCEO1oVLo6Nj0xQ4L0xt/mUMGiobs4VT4
2NX2Oby9b4QeyVc5PnoLsY+J69jh4i/ec2hq7W20vqyCk2QF6MEeJiSqg0CFd15mft5E/feteN06+HRm

kgZ3UQRoLBLhvFRNQbwu5epEt1rKDF/o+ExGvlX8F16uoPoo4Hzvkx3PLF+eYtm4OfDWY26M1l2us35s

PKbuEBaLr2sF3ROYd+pXScWg7Q6mxiMSspZo+hKu82
wctTbUP6wCGPhp1lck7crTKNJeX1Vt0jPCQB4AECz1bdEwmy+UPX1oWwlNruXPkNhATDyumUEqMSi0Jq

D7eFB6NJ/yer1EzQjdnUHWGe/a3wFOvU3DzRo4Eus3p5H8X46rkKnCXWbuQQ1DeRnOvAJJXKq3196cOj

PlJg1udQS//PDgPrUH8N+m2TAtVZ1gJ0Rl8G4u+B7d
TFNsMledELXxrUJ2uKqvRVf6xt0z9sDpD9RnXMokOmqCpTuakodu76Xsb258LIdYR5euImUOhCkvnUcV

aja5+zNLJw8xEM/4X57bTHV7ojvUnyoJlYLX6T7DXurVMFNCif3vtYzaQLVVHbSDEv4wIT5iD2USqlEN

nWWJV4uXZryTulKzwi7QEEice0sre+r8tB0MBul/fT
25W7Mlo1u0ewQcCrZahwbQVogkx7J+nT2K8/K3BoH3NhQxAI0+YxOsYMO3aXP8DniMow0FuqbCQbDwEc

6HpOSshtmmZSkLnWfhXABPcJbFIRRHL4u2VV9UQx2a3sQUchF/hjph2ZxE8RP5Swn9RykpOXvs+TP2DV

IA+52DgqPCb5bBkkJmiFNzOvvetRMA4n83Rbzr6f8N
c5LjqI7HhvkQJK9CGAVUg5I8h5XuBHO3JGJBBPuHni//swzmjN59lMCmHFKRgelqci28VmFNnV1CG1Al

FPeVTH681tb1GdA9w66Yd9JyroDsZAaPnIhZW0kHODSfsdKbmundn+uZ9mxaQ90ikCE/zDMGRYuMECqF

MS0SAZLTxdd95bgwnmdfPkIcwrqvKEzObOEL9FNWXh
qdr+qcijLODwRpYvIqN5rprbhHndTVaczCmB9249HpRbuA5ilUTJjO7+5JR6SpTLW3GYsvUiTAQe8Apl

lbz/mUzqndf/X7Rs2FuQ3Q4OyieaVsRreV922ta09Qf+pYH61kSePKsrd6AI40HuBero/0pXvHrB3dX+

TYOrjY5o5/3gmKr8xofSm2y5f/Nij4kHuKnjlsD2K2
OPHjB/UNwlsrEDssBlNAtUy+zAXPDHVcipqZuT9ytEyJK4zz7Bn4VK5dbCPA3mSZyYCg3E/1qKXZObxl

sj2lfhGbF+wZuI1qu/s/Lca8oRSi15VXRjmQeIJ4psqX2ruSW2bztfX5OevbMs+pmkExqR9tTeSJGI2X

hiInmhSvaspN0BbGa62lDag2URrG7a9mUGKgVe8xZi
06tKpuWLE4OOFDCwfu7lyByf1GK90fVF6rTEQ1tm+zgOTrPt1nm1fu33JAEQMdr3r4XQapCwPhqSu7B+

CqGt+oG7VSyPvZ/ElnbAQfMnmSxr+cLZ1XLTC4v7yHqxCwWhQpUU0WsS4Z+IHNk9q6zSWHgHOMKtkkzq

9KE4e+XIQIFVdIdRIjNGUplHqtv15fyj3EZ7/XFlmp
0thXiQirGvvLLdl6UC89X8c3eyorfyDW7lPJrMo+9wo5S2YLbo5VNOj0E1lTp8ctv0NuU/wKHneBWjZv

7KynydlfFaybgkuENe3tD0bIviNgqnuMgohR5HWAYvnh/bmVsDZxAo+CfZWV1HSr4ydRbfVExZy3vGy2

j7qMKRVOJdkQ92qzmNq/+supuw3zj0sB8gjTNSqrj2
yMyyevEwS+beFnVVG0M0QmSXWn9KzVf5W7nHI9tBtZRnZ2+xkFB4JfLXHSmwYugKK6hLHy+RVk1npZKs

NUIImPecRs+2+I/LBinew23ZVZ62ic8ytDb+rBKPM2LyR9p9KGIoBCE/wbBMgfJ4T2zfqbDH8qn40x5h

AdAr0FcrjabICoZ8TRHfcXIkq77oq6Vhn1IfMsMk5x
FkKuuRzYQJzcNEvJEyUToa1/u9oAn8q81bMN/SEASKxn39AchZxfZX3W565M/kPRjpZQizpLBInID//u

jbYJC+Kt8e9qElh/+W0lk5sD4ezIvf+70yUCFMNyvkItvXCjODB6ZBMefa5nOG6VeMItKnZNcwzqc6Nt

F4fkj77ekM1TbTut7J1Y6yFrlmanXCAVX13FihTXQS
bG+y+tblL94LKYndAy3rkYahwGlp9AWyX/nOHjiRUY6OXeuxVQM7+YxQD9rpkEi0LteponqTinUEE/l1

wF/0d1qOSACY+ALdR0ccYeNaIOch7HlyJ+HRh3XJKV9SmT8LI4urMXCjcDwQjh3R+/iV8xeXf+hsqDpB

KtwUfuFJV39KYF38LZS1ZHfjlEWGO54B7OjB7gvg8D
84GPaLcZlm/YDfIO8aODc0+gEwGisG0K+uXxjxYR1A1i4jsdubk/1yuPrDQVQ6cH6JEmv2aJ27rGc0u4

ZWD6rkqTUXGJ/msuVXKHEMEpGB9MD9Ilbh9CQ2qCrAE1OxOQY47VGfHbq4Ae3g+hOf1WLfaYFE3Yfoi9

IgJarNW2MUZlqVqr0Um/i8XdvZiHXAh2U5ehQTE4Ov
6hQUvodN4mim7GWDld4B+Nml65fhJZvDTdATEjknkOb5dZYd6TsTKg+oMEfPzUpCnnVQyVx33qWZiYYb

6olfu+b52xWxZ6g2+pcUjqcO7tzC5a0qsl3wdbzRCUsBWnwWKal04XrEccUveJKbo5zC/yaxlTkjZSAF

LG0897kONG5FIrboEzfRPnEyF8Ddy5/TAXZLKyLXiP
Kn7favsTaLMBR6xRBl9pGkrFssK8Huq5r6ucnr2rrK0QvApvt2ybww1hhRu4WY9x3lNWYLr9oJpnkdHI

aho4YFlx5AavyLNPnysKEZcnnnG5QKqpSz1VgAe/AqkCQm6Qq7Zai2p3iAEaDvcSi6UgHLqt3iq+PXho

kJ1jdXxiOXI/mvuwberEWFxOXazvTsSI8UrZ7d2tu+
03HcE0E5r3e1yNnl2pOnHfn8E9/24bEHnOq6IVhTH6z+HeuF7/N5WGHkVZQqNHK6bT98j3eP+l8R8y4f

BHJuTURTqCmdxe3l5ru8S7owlrdD3dDYEwFyUvZ2hxjFTxTy/Iuq4JJMGuxzAfBmqYLiByek70AZEtma

JnQN7hLct3/5J4n+PSG2VNVyC2B59nv6o/jzfUs1/Q
vE14WVY7aUGsWxe80g7/qoNgER/eOtPIXwkgEA9nnHPTSb9XGgym/kkkERwwvn1cGuypmPl9bskPQ5P9

hFhZpwQXYVxGd9wttxMfLiiBjrkc2EV4G1f4OduO+oUAKo4+ztnkMd2KKxufEyzZXNFSIzRVsw7gJ3E1

k1hhiKM4G7MYZkafw9S2cdz44gyx3QyavT+bD8UyIT
A7ExIPk3c+j9gym6l3gPTVOOg3uJua+vEi6aGpJyLLoy0AIYmrvXJdjlQgNhcLqQw9nlaq6BU+Yj4fSG

yQoYLpvXFtILQqx9m5csaPpdHzxwvWiXl9cYqEJBcouA6CaxgU4XkT40QfaekaSckL989JDhe/r64KK0

z97O+bnncp+pHj0wOAzxxaRXZRZJmtHt0D/J7tphXm
HNcuf7j3DYpk+SWD1jQ/lS7JFankvAQ6hFSm4pDKDPnTxrXzJYQHLahENxvRR7Cg9i+AFzWMIeIKKcvJ

tp1WQ1S2xtbXLy7iGyOw3JKOvtOFz+dfQIApn5RVdByXqUYvW34vdIjCltXdpCd0CXFClu6kWZeWfItT

ebjyYPl62z16FjQd1yFaLoVTsJa+lpzIWrSZUrs567
ptaymaGJTkSZ0rgatYicyAYc9bOQljuFrKaPBmHxrf2T35Y16UIca2rie5IQnKqkYdli6zPkmILNGm53

1tCi5VwZ7j2V9f2oJ9onQnua4Dti9Y3q/z13An7mvGst359hGoSpF/Sfk1Mj94Q/cuqvOD5oDUP14KTF

wQnKnUQaU9TdGlwb+Azw4cOgDXJwGuOZeSwz0jc79V
DNFJ84DR328KDEMl5LWU9WVfjfan7l9D4Gb1jLqH5fODT6LHuFHnaBK8pMK/laDMYm/x/Z9wJzvvDpQf

0eXGaQ8J5ioGMnAQSjdBZW7HYMYKWlGmNiyZX6CPY7AaAQdy5qFvUfb6UnIQHDDs+87/72Xmet/eucH8

+vWU+jaj735jXo0311S/AJhU0fnLK8yj120bbSFOww
vXh3OFg5+RxHIkLt9IILJiGXdoecIkTQ/PH5oEqbiCO9CxTneCDWCWvevZHxUUAMlGFgWETXEiioKXtz

d2HNkSgIY4VdFLyjre0luCMYPcCNdozXm0wmlHjosgYd4uxl+itxTZ/S45D2SlPsjcZ2r/Oq5MOJFOF5

6NPnQczFgH5OJRT9smAg/WawWm9lCsSOp8YPL+Q4ig
AeR//tuqKUK91kiVbuG5P30WOVSyyEXkgtMWGMXGIOBgCcej/jhd2RXjGmxzFVBygTLA4XaccsjcAnRQ

Xn108CSt6zHafTpfx7Izqe/eC3XGH/+gT2Xz6sO3RfU8Mv5UfxdB2i1V3izy0iC2nfndQHuHz4rA3HvS

xt3d9cQ85J9j+dNFaYJLVdWZJoT+jLMYz4VtvPisA5
IHrsL5RzDfpFQOp/Ye2okaG53Gb6Al6+gogaDB5gmXgV2+HRxDC1GdnqKatGYEje5LhAjYCOio4HFDfb

5bVTxnDDpL7OgzFxeC4L4mSlhIURbaXTr4kjssJiasjTOKK6+NVSo0YXXkg3l/BRDAUxxTeLtq6MYA4u

ST8Ia+nc/gorP/4fKGuUDniBVzepmqpETNeAF/kbGn
MyYhN07iqyfMZJik8gEhsrZqgS7g7WKFU3iOoBv/2WlmvY02/hhPx6R4OIjPDhJOU02OznEjJt7WlHvS

Hdl6KW0VKwdhGwbl+hV40ENglS4VzzOk2xh9M/2Y/vFBXMZngiN6byK61U/48bG1L7HcjJe2aayCIDR9

ktF7d9Ck2bh1nCpdsvS2qttyhOmRpCXiG01RKpAsy8
joSFHtVaZXTxhH2lnzGVYMXp4gL1BoqV4qCvpgLrdWKbv0ZkQG4pi1kbz5alK1/EXzKT7Rmrd9ABhPzZ

0IopUS0fodkszZ0l21MBrWIftS7tfKwZByVHJqiBq6FdjpxJcAmbLC7h8LD3xKK270tC/XzujUwA7EJ/

1qfyMtVhBn2e33Qizif+JiqESM7Uuz2kKtPTD0FLVH
YyiiKhLUY+X8A3oyI596C7HDZro14X1vnXtCNs5wENN1rbj4cGR1QWI+fTShU/RCcXImq7da0aPJlvJ0

rz8IDQ7teYN/EtTARisEE9iHWErhNGejs1/xB8j/peSAmTi4lFgysgAlXzptaoSNIpLoM63IG6OrrDIG

+At0Qkn11EAEWDB18Gcmco/7crctAqng9ICjPsPxzb
qTRkkW00YVYxd+5s0AORJ4kPg33SWL/2ppHin6ZaK48vXRQUtPTdkyDhjGtktgEpx5gxw5ZZkfpUSZMw

b+ravjkXAxRTgCV8K9pF2FDd8hWn6ghI8Vd+L6PHQ7ldWLk6SWgBBYz0kuOzy9udLHD1Ds1JI2ZDgzzb

o11F2V22lP1fSf7pzW5R7vv5ORKul6BVHted3cWZyc
KY9h0oJlq3xb711LaoIimDYlRcHDnSweNfkyrXKXt6XTBttRfvQC+ET0honGfWF5VOuW15bkkbHY/7tJ

wFC+7GeIGvf+wt6dGO+zP5+mENkw4hDm9JJO1AiAFlqDDeR6DlONGdVj5xR9J3vP4iHLNu9thB7kLuVs

0SZBsMNml8x5rgbZu4fL5vIsYq7UfYoFWcIGzljtgf
QkMQ/aDS2APfkDNZ+i1V0sqRgFcxx554DWZyxTP+xy49lSmIUOZ28xka7ITKk+nksCWnthySG8CKMG1k

X2bPFP/Sexwrfsvpp9efeZTPDl6gPSbj5FFRbE9qXEDRlhI1sbFBQ1jCJnRcN4zuQ6/K6P5jG/FSx5ZE

OfB11wO9kzUkTULf27umaqxZpHxv6T0hXBo6EPqAHn
Tr2zhZGnLkpmFd9vM0oL3yKRRCCU8WpHpmknGe4UWkNfuQ8/69VxWXsozt7PEPr6oacHQIHHiJnD41AU

fBGpKrJpsD3LM78Tkr9SlxsxaVn7TRWBFmgl6PfluifzgzDJedabtmzSC/5PzifQdzKwz/Th2SBnUvJ7

Kafr27wqbn/vOI2JmtwW/Y1mbUxDmDJGlLtwdiLuyi
rhona/v5znzqFC0cGxo9VdBpBg85BS2384DtNR7z7ETCimixkEkaCk+AmWWa5ZAU4Q7lwH2rMBeMIqC42U

oCPEpzOwnS2w3MM9jncsHi9yEjHrCFNitg6OltiudrUZGY5hn9hPO5k2JXEk1+Zf+vo4tqezKhrf/m3C

X1qZuEo3+U+h/oyq15CF3OxgTjx0M4x4/KMAL8F8DU
UEfbP4xogjx5fnktlTzrdepGn4s2okPcvEWYjZQnRhbUHHMgvhSooyBmTr8T4qOGJVa3R2UC2MyLi3Kt

ECsetF4mD8DkhMYojN6o9tD02BoqtucrITuCD5McPLTF9kGNNrK7WlGSR9K+/us61Aasc+c9BZP6k3Ox

4gPJBGLRJBRWkduISl1pGaVNocECh8SpeY8K/nN8v9
V/ZbMz2R6qAuIXSgKRM1Iyj9VBUBoh0diTLFLnHZsRLgG6cql+M3LbF/ov3cJJotKRAYD+WGp1fxtb/e

wcLB3kCubg4b/el6ciNO+Un27K58avPpnlDaCPBPzbDN6vih1U81Gh66igh7wjb4uRScUXo+2i5d3RP0

Lo/+mLSFEZYQaFqDUt7vd0uGaSnQi/57wqx5/UZjfg
q/5dB7IlF4DLq2mCYO0cd848G8rnY8g/uTMM3lNTVpM/OrzdBou4+sk2B0dPckTJqe/YHnLoIwAZo94L

fH9X78Iu3uFZZjKQuPM+HG7pOOJir7lhFad3mQtIulumIlAy7u03hc4k8DUIeyM6PhKTBQyLkZKi7sxe

e2DcXKCMgsd7q05VJcZ3zF5QOWKzZsj/pIsS95dmBI
YxrpTt6iHPtKdg6oH/Tv0gQWAxwa9etq56o0cP35AVrSO/xPjK6PLBNvVB6BHP75BanZhhrqgdEz8jO4

m87186IEXLDnrrkxknkqEoAYZwiEuF0g3pmzi0vHal9F0BnIij+lNDYvz/rjgeBUHBdgs3KNZraZQc/O

pZ9DsyBQt2yQVfS6XhkzjcEi/Vhh8EbWVtoNpMsszj
JxEtqzN3CB9h/S0wDD3URKnAQOBi3MS3JNsc8vJk9agDcGHhwy9U16vLBxq2kUiJ+EOMt6qeyOBvGPbb

3jegG0cEqYNB8FVdUtAZ+8z5HM+/TFZ/W3HHI/jG9EKnYWEB6B1ePPOdvx/pB5pdjKm/o7+HVy4ok3oI

Z/+1sXqNTw1pdg7IotlkAYxy9/8G+xvwwTaUg1aGW5
b2yzs6DlkrqulxTs8PXwzZhTeT75sQ2S7rN6vQHC1Mr9r4PKLBWpmo6mcvZKnHuQiRDuuSwCG7Cpz3qG

5MJ3GcT88Bz+k/m2jfuOX/iuuYRGDc5VrOX1Xe4iULfkJIzn549ffQYwnuwk4o8b/ChgW2Kgvn3gUSNC

S93D/O8ezxSOGOv04KihNy5kh29RhAfCZ/7x67FEQy
XGRT64x8MsdiwN+FQa4V8UFLphm8q7m/rqLgUWNwXWVKadV+65pOCKsvk52cA27gVJ7qxKv1hRaoeJvS

P0s/nxJwo8NdoLhjbawDkFdpf2VTH1nP4sb7QiY6oFWO+u0QWLvTtGHeCLRlz7cjipb71qe+24Zmlakb

pSxGAfuds6rqZj7Z8+LJgXZhf5SiPugkMhigP3RUQv
bZYXausn0pnXT88BorAmvtWnblCXXHD1Wi4yJWkChzJV2Osy0X6AZ05Yr2LBG7Hv1ZwnT2opMlmREjbb

w1w/nUaGH9d7ppwcH8oUY9VXLA8oI7MgsZokh9lHzGW7mZuQazeA6rt4dxPw+Mb6VkT2N+Ec6doj1ZjJ

L7E7F0KodorON+cuPs09PGow1IGCLdMnr0O19Mo64N
bh8dqyVWQgN2/+e+7q+8cPiaqmIc/bUxLe4b77Bi7WcjlhO/ZwPH0wu4etjhbjvBxlw0uuhLSAEbX/Ma

0sGsRIPk2KHFauCMZR4Pv6mCKqG+nCgHWXx/7Vug4WG74GrZMkTupH3PD11Njd5dk59+d+nR5dtYZyKJ

CrD18J8n3Xz16SZPZzelQg7R57+Rn7PwOB+E8of1zu
7Ln8ShHCsi/NQ/53I+bioUt9pk6mzumOxFt/dzkT9iK8hj0QWYVz+KdfZN3dsso9v8kIO/MAo8E3w8cG

3GzssixBFoH34tWlWBaP44kJn0CmVnY+5FZxhOj3gtPV4pdtfYGezrGinsCXMhKTJwfZgGpvCz0iO0vH

rHGyJ8pdi9mpGrYngfJlakalYA9ArvjBZZFM2Nld1q
n/MHkszCHh9nxt0wtav6QILiqraCxaBYi951XTL9ba4yiUUr+p+yi8XrwgC4Ag98QGoCm6RzjED4lC43

RlV1viVaiqZX8wE1HGmW9M6bDcCRMwoeA7vVeR6l7tmDcB/SOB8WatpcSu2AwmMskFBjbSgL5z/jma2+

xlecevP4XlUT+4fB54/4jO9IFqp52xBCzN2c70lHAs
sAJe8RpHrDsGlg8w+6g2sr00j/nGXze5hBiMub6NkGYor5srQKytTitorCC/9h6MMnzy8R7cB5kPT37I

nfCt0/RRvDyDU6Th26JhN8Lvd0r69DuuTz9tmf0NJiPiuUMziX6hO4QVPrjP/ayzR9eSF0BuUdWkrhqa

tFNhatahGVycVDy/rxPyBjkjS7L4UloP+jz2m9opZt
YwMZqBP0w9uLqdJ1C4gqX/GGZDXgKMXr4JtAjg1WnU21T+L2Qp3vqkCZCQRly1NfIwlN8Orn2CDY8UbA

HOXTJ24fRosJPQngEE9RiBEHjxnqm7VI5gWDCOqvyTn5rabehKRZxyzCrOLBzNI35v4kZX83zS0V5zim

dawVOtaokqU2QCf/hFEc3S9skaHYE08NwfiwmZipl1
Kkdw+Q8XYi4O2/TnP09cQU8ohRPMLj5qoFYVPCQ08+fVDdT7NduQI4N6iP9R9fFsY78jlK0K58vlksPj

eMG8znG707tZDbpcuwBwwSjMcf/8ZRONOT2FbaNV9az3IokuqsVe7PVjh5vPGcB/O2HXcgdxJ6UlrdfW

04x6ZcM59vnG+xGuGJiBlYrdx7bEsMFy0L25WoUkbr
8kWQvEHERM+JtN9zxLQ1bHZdu8dDL/iG9U1TlisnqRfA8sWoT11nMTvORqo2p1I9w/CqZNQZbNemYUzF

gy0t3eUHXN3UeAcySD1xgU8Ad2ccxFNMNjVWERQxinZn9kS9ZvDhopnJ/ExhJrRcwDzVPOAk6j7Tstjg

yghj9JqTvyjZc8w14wE+x4WkX2m5MAqV4VmADdY4th
lpi5hlMvihU5YLzj9XIXPs7kRn/5MCO9cEEx3OBNYpLPlfbUGfzH1f1in3h2f/KmmoZpYL9AI7sDPNjs

fbfcbVy8+loW2tY3V/ipRhIt5Rmu25xZZe3OoLy/QacyMIZtXTa4ka1UY/mU3Ncn6ZWAjo7XFO9w0ZFp

9U5L3nP/9/AhalZKbxMWRW4sFv6+nZITHMWi3AYy8e
1/cklfURRqBxnredUNlQlIdlWEDKGl2MvLmY7gl/dnasrapTeKLVdK3xAlHdisWF2ibekmKqYpggZDe5

LHzMGngxadTiQzmBxrtIBeg2Zjuuhbs/HqgrmvGk0B3xNQCiNgMzz9UnQIIAxELRC2NGnvW97m0r4opq

8L629R0zUhUke3fWAKxl/ELyi7mOkBWDvWWYZcf5bJ
ct03jGtlQ1u0gwa0pA/ioEpHOi4kxWEIuERqlA7oEWFZgYzgvGenyBjgq7IrqgyznV51rzFqw/klv1sg

N9WvaApu62ZFufxlahsvGEU+gkqylg97Md3AVbDXcTVRsmwyazDtoZKF9tS0uLRyO/q4poIEzSVBfFZ4

QOsMf3BLe5soJnF3lBIhDugs9QY0p7PXtvIopUW0ea
nhWx//6+U3ygbf2qVr+FUgED+bwczMc0LCEXm2OOi20QbFMxbibSZ3GYJYbZT3oO2tT0IXomnjnCC9ge

uc6zEQQhTJ035DN7AuVblAdC66T4b3Ii1iRKQoKcI8zrqiqCh0+ROAKpo6Ri7BhSsqaIIXn3OGWgV3il

5baqiEgdKhztF9PdnJ0YfyboOnpTKWIkecpl6EyGO7
mzck0Bk8Xyi8WWbaZEyezbzN9+6rNTOQDW8KkFtLsuW0VSTGAr2dEz5FxKgFPaLDfd3zDX8sSkGRT5Q0

6TqFIPmAMx6Vaqsee5eUrCXHgnk1xjBFbr2cFPfb+rt73ERR/2l7foziLv7+9zob3vwd39cLwMoTqt23

JV3dvEK8oDLXnNniB/BL5ul+IyFydeEAV7ttT4m0yF
vuuvjARZ/Bsypo7Zqzjl5Ncf7bDh1cZjfZ7JwyrxYyczfa6G0Omz8PtOk/HzLmxiWq/fce0vZgh7phIq

VP22PRV/5IVYsGVO758GKz6Irh+BcpIWiu3pjujmCudKmJXOhWngaIZmozG3WEpNED74k75flAKUzN/s

4ea0DTdu6Nf/21+aThT31rykIPjepAa3go1j3DK9+X
k3Lq/SKX0wBMxX4NBetWiPSfaPkvKvgLHpxSCM+mPtSgFMtOMqod/lUkq50V1qsPOJtabCR2Z/zbUB1e

2TiKc52g60xlmQ8HxqctdW/EantV9uaff6P4p+18Zkbe4hbEaqqMmEXOsCU3WDhJ/ddTKx0qhj0GnojW

9RNt6T5NfD+7xGSL+WRC4paczFOhc3fthhG5NWLSjF
vv8sFNrSb5OVDbppokR6+FhDGCSz9dCWFz7dH9C/46lJlB+WZT61xKkVAptqPSQMQGR3e52+tQyy3HMz

jHWy0+S+zpyfsGxyiGE+/QjFG0Rg+5TikwUSIj1cZy5OlJWZhnwvrsm8qStXwrWrD7tYQzlPmyiHLeYr

Q0VNUL6beaPPV962gEHt2DbshitEwsWgSWHGzEcYTe
hu02LlB+BeZ66s0gACffI18tbelNyXQnFNAlODysCxtd8vMfSrVJTdxfrh598Zz9PWOAY5adLOiobF9i

A4fqGZ3u4Tqqk4q/vQSQ7MlEJ7CRXX7dH+oZxfIBUvoUsZ5jW2L6pVu+FVRaSh1ZGRtDLlEapGept+SY

4MO3G9RZ+jKgQXZVn/GxzKvHPpbFjgqFm3WdOdxojb
8+3s6BULo6JnDAUY+EHdHumoReFSla8bn7QEaFhjoybxygzzHbtYRxYZnQPDWxzADV7ZtTaqIXXxkZ7n

YKo5C3b9sZufPAg+tR83E/TaVhBbq9O3FGLMLVPem6ZP9CjEE74BpuKjVbUFC0/rCh6xY8R3pAcll5Jc

4ulPNlzgakwIyB2UJZEFVaG59VfaNalwVlnGcTA2iV
iRh9Y7C289N8uJ6RpFv3awU/sPeFo+vQk+i6TjaJapDt3JqX4U5riKfxLk0jPgsqeOx/V70QAwFanQ1w

PDM12Fsl3ffGkFaoJeQPpBCDg/GP9nlFJCo18y8gR2cAF7NRooqaKqXGjoJp4pHR+q8pnVPJq5KxoK8R

SM6UEtkHtqIXhBtkz6nH+KyANpxb5njM5IYAB9sWqY
dvgEk4BolglvLlTt4/xSK9aQ7YlpaURJ7fRMp5GibnbkbZa4Me+W31eDpuq9hTQ/1ey7uj/bMJyzUJb1

R+xQL+jjMd/tU4MK/dNrlkC2VT0CAe5ngh1A68dUtVmBO9yYFtBF56ejlaYrFq4hXguymh+JhFy3TJIQ

GnzqUyzTgoP70l+7iPDY9qYrCYrBUGbOEkMWWaCasl
jDunXh4YK3pGp9GWvtM+i2jyvSeOaYGfwyvxWvPvXx7hxlJiVQ+aL7qxVmsN0D3sbkW6479tpXo/TpfI

AzcsWa9sdUP21UOdBnTv/U+O3h63kdDuibzXzbtaAAwV7XLeuDrnWMckpyvgV2Q8hWPMonfgbqZVdvCD

1dVrEQNRTmJn+/kiGiULZNn/vPRYSkUCyX6Sv9XknY
T8zGifkW9LBs7DyXhwfhhfSsia6u2K+fGs8eeJxBXlZaliU2/MPdrqKEBMyWrwIGTISiHIGJ2mX2YTty

uCqH2wEOskZL7V6Vi5jmjRhUDA/Oneix9u44IWPy75j7ezcdM74qyIYKET7H+O5lSo49vwpp1b+fFqEU

wsBmgUw3jrWIMkMWfMljGoQg+nYAK/hrL7fwB7sEFg
H9siBx/kEcYG+zOFbQl3ysh3okEt+X/i6y8xDPS2kTS/PVp1lIt7x78CANuj4fA4VAty+GNiAJ2O2POo

jMIB1tVwezkA2WVRjl1PyinadjsYXV9HOIOC15vq55xXdKXNpwWmBympIhE+Ri+z+Hs0LAIWinX/Rih9

LMDZ4A3gBH5vIYnjkk3XlWquAJ+z0A+Z7FVd2BB5kh
EEecFo9pPrxC5pbto6A6pxWQJMniSpK9X7PhQpkCpuhcH9PGv4fyZTcg4gRgQbTt1Nsc2Im+xnJRagJV

4tG8k2E0KPCWBNimUJb0kDsojcvzm1R6y5HXiTwN9enJ7mLBgJg99d1ehMNsJGLtFR1go8sUyJGnqCTF

6EPNHobdugMwUZm0OykYxqaaAt0DbCgjOE06gJmv+T
d53paMrH+zklayjHubzCTTH7xON2o5jBg1EwRGL6qFDjmFNF+LjRpxGT2O/pEn9/dzQnsmJbQB7uIiJu

pVp5TKRt2ty7DeVkLefjsNl4WoTlkiZkvo1UJ6e6guRHPoEzhOZcDaJxKz4oBStjXi+7NvEUCQlX7sy2

W7RZgG5x7UmUUWn4IwbzOoeaP/sERx5B/nIB8rZmGp
B0NANkmlh5dLFbs9tzjrcaNf/cNYGXldPWawL8t0VyrDUwdANaE4iUo7dtCBK11zLb3XphwcGUM7/1nz

bj2ZKZMfRuhci3Bu0UzvnSbci7bYWayRdPU9tVneCj6A5yYrkj7NERD9d6D2p8Ddu3Kc+NcWaLmCA9ek

cs3/4VPR+fH9BpoXFpHDkPLAa99aZr8ZWFItzq9+2m
D1squE9PUXdkf8IT0HPH29p+cUA0VTh/59695D/ukZtH6IdIQmqyW5Vf0zLOp8p5pwgOM9zL8K96Po6a

y8dJd7E1Ecyj0TnKYJsGabtR/e4WEtwBrmwICOwC1Ex7kdblJTDqHsXedmh2fNRFenLn+LRH9QQVRqJH

xvGtq8HfEEO3ZkK+TimODSe/NMF3k//92T0r9KeeOh
+ABgXhQon6BDQXtjS9Ecm1IG1T/J68y/FAxyP8XdxslN6osOv6z4DGMTbYGrP0sZdVroNQj/PMTS4Xam

S3nCLSNllrvHQOmu04G+Vk9UFk6kBGtBDLH16FZKw1MjE1hM1gglXpAi6JWCN65z4MSuvMyuiVIccPxg

PV8yCEbBHemmwYzwNB0DdT0nHVlYm9FStA74kgIY5c
3ZJYQA3kGueECVedNdeXFj5SknY5K4f5+5fLjpyDu7RaYIYp+M1f3ZwnToFJTxwNbkKw/jzyr0KdFv5v

T+kpSeNnIgwqjf70a1GaNO/sZ3q3YfIvWN2tJbMIk6uCxOPy+3bI6GOw9KUGxoj/tx5Rki8tIlFIvPuu

huHFvP/tu7R/6/gquiw0dmGCGxj9/8tQjSv/N0NFKu
CVtK7hsXEvcs2rv41kXNeJwVJPT0S0JFqzQ7dk+Qi+B4BahCjzVAQ+jRqcxfjyUHdeGgzURJn1Qekwk/

6e2ilVlXOm5OCH84IsJ/u7ePxSIGwkTrYP0+bDA4dkFEsyDBXjIepQ5VC0x8ZCMKU3XWIhf4C7py/c73

wLKyE9f5FgjDUx7xBGbLT2HwAkG0UlUhAjuW2eoeT+
fmA6vMiiKMXP9XkYwGd0oqzsH7un969ApqFETbXT67ZPt95L3Q8jHGf1599bgCdX2VFnxsUXxm5KXEPR

FoqoMfoe2LgbWHR4Q7tghWcyPx0qi3tVLikzHLvSPV/W5+gG3h/6mwny0fh7E1P2qL2Tob/mlZHUznxJ

gcj9YBRzQwE5lOao2xXE0k+KNGxfLfQUfSCkUv61Gk
kWK7NPDgZ4IXcsNM38CRv2rPwtz4LFHOXdKa65/9wj/bbxex9rNYusRdB03u8geyW6v93Xx6NuQBb+M3

WS9wfDyDGP4/WvgllB0yTTl8OFBsmk//3BBNUPKpuMklhhpVkWjhIkgsY7oiN2paIt/jB0Mp0K9KIOek

pjrYwV69jJ4DgyERSQowNHDh0999a1wh3I7LsS8IMw
Qge81XzzSch8VWIag6RcYyPqhJJ4STFDX0K7b/qfppFKqXoN6/F/FsEJ7Df5YLr6PZX2zcn7/kKDRvea

Gk10E0dci/jveQ3EEKGUi6/9XX3f30N/PRZef9P4ZuH9EKlOH5OEa2+SkVCARYuaR8eyeX4U//CxBC/y

sRwIY51Iu/uFfztAnZ3FNIAhuuxkQgsu9v9pxB/Krf
Fs21vpV3FVY6tUoqjo+XdtI31UYk35UW/IBC9MWKegpf1IhuWNgAURYG967YLEiwI8lRgZRFluQeFX/5

gmtvItHEipuE18UG/2eXyl+g1A/fSpc6cRQLjyIuxyTqY8w95X8+ZpbI8wjkuS7VvfLAayi0bhaL/dij

dUp3bBmSZRKNgs+ow+fd2bJmf+YGggXXhTyaUjtnPa
46+Evans/ZvZGOoBgjlqtvINkF9o7lwJBZbIGYrP/WVxbo9Ijx9q1Ifew5X6Zc0RIcIhz5g/3L0IIcf9G

b+2WU13EGczVio26JS/ohUd184skMMQFaLAgQn5O8Qmnfa+3Sj8CbYHI1GuCT+jdVRNVsdogOXlma9FK

lqss8CwdV8M/xcT78geiOGV3rInJ3g2KyFuiMcIpM5
6+4tMmvzw7BNCBV+/QohSzgzURfzc7qJgGaC9sbnA9nSCEyDYvhjfKSnM+oopmOKxZj88JX5+yRcQfnG

hwAV8eJpi68liYpzDjqHQ/fmU6CWrkp8XDzt72asrWpx/G+zzfLDupqE+ztdkr1NgghVQ1c3DdmSCEtj

Bvhx8VH1z9+hxpIdTU07CLXZ6M+gwbQ8OKIlocf/Dl
wVk+9k2sLyyENU+1DDDCj+vfnjQgBqlTosYY8JaS4mlY8Rv0GGcdtPk5aiCg5oQJvILxr2vSvf7JveqC

TjbfvCPiqrqwCvNBFsJfyS3+Xf1nDI0bOd67INhhCtSFHK4yZsFMM3pkUxD8vKCLBB1+d391o2cgWeez

4RxY+jTfPZ1+Kpn2yU4na70mQboBxxUcDbNPkcL8V7
HXI354xFsSyFHNK1xFUqStvZWNEK9pHv5bVkg865f0pglgCVkt1T8jkUtH1k3wR+iaWnIw7Og3fclpr4

4E3dBOKNpae647rejKWU8n5NDwwoBxmEc3Tv4pFNrLgOVtJoNCoc3muEeLhn5KjZwCVR3CXGMXJdowCJ

jbbsKMN5XZOZsRB1G5VvuA3f39RLz039RVLejX3G8c
FrkoK0mrn7tmUgOeVWp+ZW4Ye+RmvAeNFGP2UEGPeEFh7CZ46DNOshUag/K9p2r/jllrZ64LOHbOugnd

beeR+yiFWlRq70unG6o3bZGphoHZLq3dGT6PJhrY26+al90ZYhCxVyXIIhqw8mQ5ZG25cQ2uglALdS0L

bNo26G/KQMXe7m0ZLQjoLrE+/+v3qDSRX0TFtpdnUF
iwFylWkO0Sn3pRwzxgX1iRsF6rpS+lXcPEIdApxgVwTmmw26tMnY2rkvHZLabBlE99W6AvaLjLG6BTBf

rkIsD/HitLhGWIiORM+LcToHblZrJcrdeTR+HmTFiA1Li6XGgUBqLdWG2wT2vrAwe+cEy077lvkrXngY

7Ko//tF9VUtC1dVeg8uXvWUwRpahb/iTMxR8gO55x3
3u5G3wCga5VlXW/3IUY5R37xQ+DaRE8MovSQAaID9uncZr/rq2YZTeXWDBj1O3fcGSDN7L8IHTGhKH9Q

Xop8Be6sPKOyQqcDfp+xvFSM+3DnVQZNgnmbPtXKKotxhq9TEmH3QeZ6F/e9ewGCCsDNLztbw9NZDWSu

6p6WafOJEmB4hfx58dNiSl95RNxH+ADULCTyJ0r5Ef
zk+zAYG8GBiqmpWU4qkAA+txodKCqGkf14p2QxTyirQRpL1tHL5NBrfvPpipoGdpK6qrCfuKef0rYSUs

8HpI80d+IVy9hTrXfyWiPZseb1O/+JUGngTzPcA6kNzEQnA7aXCXW00B4GORGlFG40BiBIguR0+f2uTY

4MHk3xfd2EjURUMkRN2AtHSbzS3SMH1dgynmEz/RpO
0VtVEdnB5SJbrGNssKgXODdbfwyPh5iq8ce/4VaUGvjIWFKLD4k4TRo/vTIaDOpjLsnSxZhavughjC/l

aA3smxpwqJOSRgignnI2bx0q+eCoTRXD+PPIF1zBGUEuYu4bkFixtP0z1wqSbrXOiZXBp1YkZZ+GkMuH

YWJNamuRdI0lYr6tvIR43uwqaVNf5txCXwp+53lxn2
N3vdWPzbBAIiRAWCj9tz1V/aAalsfolbs8kjlegV6eaNdDqeAE1mhRAe7du/WOnwAMWDjMW6I6m5XLkK

su70F5mEg5UXdHo+v7lQS6KciB+hJvxNOha0N3HS5yTJF2+a9My5/wm7VXuX2EMuv80sNypuprX0sjgl

Cwt5QZ+B871/RmOXyEGqEQWmT06vcG5dOMzHiwbcbk
A/DDnmbLcAWZJ/TUvYVfYWYX8pdLXdf/OS357rIEZLzRtmBcyf1hKbrzsWpQho3no0cR95h1abYJMU3a

sJLoOyk6Sy45lH1h+fRayTuKvxWSOHmHCnfz2cAQwmBfdmJMXGmstk5PpOlsbb2JAc6LzZbk+VPmd6UO

iv2fDLhT4q4XnDl22zr9z4yDLp7Zr3vEYVIHVMkIZX
qG0ySn77xJfBKoU6e5WhxASyZyM8iYorkuKz6X5vHmKmnp0iPurdjotjaVkIaQeglKkre4nyWjREeeyk

kVkXTyMQ1qQW5G7z6MKHy/dHtol3dANiZ7KNOPPVb03fx/Vda8bDhgpCDyxX5C2xAcH3JdRv1pbM1SQY

SprJnTVts23eZuAV1DjUqpFcsXo1oQ+s9hMhCry26S
9T8IDymflvSNoD4M2GiCoGxNHkThlmUmWKRuRKWKU7pOSR0GHWX1ijp/PrdBWLUs3ayivIcjZD9I6lDU

VSEnQHzopsqRhSCLc3UZ1SK0A75C1YrehhCJHsYk1A20ImGHj45S4jx9gFEMoXv75LOnAG2nKlYc8REs

5zr/J9U6HcXSkDaZ//+JELX/V6Q4TxGwfx4UV2r1tF
JI3g9Yy0VEzJrLONHVp72OCgKrdQj51YHrkQrBy3q2drWhb2Cb8vwnsnk6lULcKB3WnAjBlY9+HVHHOl

Gb9JSqA0qEIzJWm7yoGbk5/++KeB+vqo6v+xG3S69P9sGGMsJ5o1YznNoRx1b58aNO0Tfcgt8p108x35

vXO81yFMHlrZ7av3n9WKBUn11offg1nVuLFMnrbToP
HVOIrvgcOQ6CbRswdf4RGGtXKHYL75GvUnV2JI8ZEoD0fOkOMJiG1Bs3d3scTOWvXxQti0GayXxPJ+GF

iLE/h0hzSYtUql/dDZs//8bztitmRuelfEq7+jJZYXPDtHiVDPLNNL+c7E/mql0X971toOp34mkJuABh

Dd9G0Q/zq2gLwbZze5ll2pvEs1nV6zSDBChAGklDLN
65+0YGe/kMguMVl43pxckUvbnLjCr6y1snkaEIJ7CIoR5wvvJoPDYKnj8aiQVG4aE/Mr9WSB9GiS0c9b

mIcBPhpp2af0hadAOdtOAyZxcSwopylLScWSuKXgPW+Hpmc8GI6jxzHSBo8i2/i+2D5RAgMDFULUMgUA

XWThgzGb0yqA6oG2ZblcQv+R1pNPEiU0ocTtCdmXhh
nEoYtMbMpvuq4qy40Bk9wVe6nyv5wWIK++UcO7FcAcspmgQwDa01Oa4S3VG9OPWBTfOM8clQs3H7Y2Y9

cIMlTezk6MD1GViJXuu6hyVHYuKSTwIAHI7N5rp0FF03Zu0mj6wOjEtocOYl76uvpGOC53QHyoOhv4T+

bKN9TEm5AyAGPNRtoa2KeEcO9Jotu2gKjq+W/+fzy1
5brOqmm2d2oyLly9teuAPS5NVC0cu+78RWUWnwlx2pT7glhtHHU2XWqx/MumfSdVPiAtf5wyqoZgMNqW

he4SbRK/czQ/dmfBuO1eb03VeNoTArqGbYegbwbwo681q+DiYVH0vOhKnV4sQYsg8G/sNcpF0ZEzwOy6

vxJ/8fljcTkvLawK11imGLaHWwZyj5WW5ICzmhpATk
7UeZ07vjCHDeSK3VeZHIat6JDkmg+xlrmzJmwNAjbZv+Obl0w9Da9mvZY60KM0lhvFD94EhpEfWfg7hf

VQop4rrh797r034bDBcTk848ur8bvS91/ER5UBpR7jXyx7eo50ZU0rrgrgDd9BngEMj28Y7yaDRJZjQU

NK8spWlUFkJPBk0Vdwdke4UFiLEwhYD6ChRtdmRKUL
lFis4TT3fNoAxaApr8A8d171F9dqSmOiMvldGDg7MKTl+rDxpMcm7MqDAdd1Ek1msy9bC0u00PeH5+m6

5bZcugb7PhEk+9ep0YZZ+zxgo7vggE16UY6XUY/Avj7Tb1w21FT7Rwj7gYXhAHL4Qx7NqIkZjSbMXCCa

GxhCOtyTkOk7Wz35+Hwj/Tcd99Z4ChcebcITVUs7yJ
BtakHFsqxxHsYz/cpX08LkFhnlrWATXXunXFr7H8JRlMHmvUYLUTm47QJ5sWgM45UzP5B2AvfhAEpL9y

aGneF5LGZybiAAbfqCXFTVera5dUB5ZzCPBU0caE2B3MW02+o9efNm1UcIh0zdszV25miCWa/SAo6iQm

xzZkHFTHeRVTvOIzTGye/5B799hlvmbnnAYZc5dR5M
vxDk8C6TwxmW7QJaH/h1BolwrmGTLBZgTrVSad+OvCXp3M+tiM6pdRj9sFkulccJBu8V8hHdgd8ZoE3c

yLA3ULn+fC+ac0QYYNu3eKGSE2N7gwI4KCoJ+Adg7IwQwJjxaS/V16KMWFy7ZH+AE1QLRwbE0R4oiTBK

L2PnViMpN4Z61UH/r2lunTvKy2O1gCTaw0exhI5n8F
nSKdZD+c3mP0zQO/OjQBnI6ANb88svl3yQu6XPEO7fQ10N2g5vkcomQMmYi+nphsWyzQztf8kWA2/bpT

/MNHJ1gEexH0LRR8slXdlKb5CNJnNdClB69zUdUfzxkMpgPwqttQeAbyEUEkA4+ljnDO1jmLQ/dZgQ15

+jGMNhkAXh93aHqA/WdbTMlc6tErQm3QsUZ97Lt4Tr
jBrD3PYKer+9W6ZKemewh04gRAFm7t2DE35rEpO88wLYflB6CKQeCdXGIlLq+QUQqdlWg8SQA+moypGM

zsL+2EFpO595uJKdwE73ogwrNW5CH3KkAFzUQtHQh3l8S0UURiXyeu8IHwFZ4t48sqkku60dVxzoaJ5s

5bxzj8RVFlSvmxf+MSwqssgP/aFDCkAo+8BNaiuPh/
y06w7Jr3ZADT2O3CldLdkaoSLoxgDNPmJ74TGT6WUnO1c7HC+u9TsGB33NUwYg6+rUGmQJQ9FtourEvp

d5wKyeQ5E6DKQyCfNAaDI/ya3yTfaioDlzHWETpZZ90568byqWV1goRE5ZPyi2GjWLzJFy9BEMWTPail

3g2qQ0cghLZuUqIGj3ix6CAHakzUAkstTDkCV/YGEH
g1mLKCZt9Zvou6mGw+b60D4r0r6wV+4/iQzF3+s5g6D3UpciSoWMQXNDwdwXNEf4YKIZsl1K2Ivg5ox9

EONfEPIkI4U+OHKcKheUw3B7vqwCxjPfyrfO7s2jQ8d8Qzh1600+da63PRG8ODC2Xcb+vUf16F/zoL/v

O4QHFlaIz4q1rhFT/0aJ+NZwUnEI1JHHbo4604GkP0
hpf8XwEviZZBFIZTURxeTPwckIlGIcnxwNSNyhSZzbIpX7QBQkfNJe5L47/hEZpYr/BLCnAhiAi/lWd/

TNngg8R4DRXw9liJDFqS0mWFnTJgsN7SPd9ZXqnxNi6Ke8WXZDWUojPuGwWTle0h5ccCOE72D5qAFOCi

ceos7gTd/NZTHhfjsUrZHK3U8HESTlvaNLjVGwc1GQ
GKBX+F1GXI/cZuHnCxckndCyauwm9Qt+41k+d9eY0+FbhS0Dq2oT+NagtIQU/A0+g2fiznuy6aIDx+IP

fYVbxxIMSbR+BNHKAg9eF111sfVPIJffDL34g4glxplNCxH8+qR/234vapf87wucF6FiettA49xDfob5

i+hfLF+Scq7Wr9OHJiDB2MIUA4A964HmdVFrlgGC+x
Ja9jvuiAT1I0W1sIWVSLI9wJb2/Zw6YN65yPxLS6cTxcfDuJSc82C8dDTUH+MMXXtPxj65I0oB9jHZjz

Jr2tpX9I3wG1p+WXoCoAk9Npickvw4mxaX/8YsCpcpET6/ZQRW1GmkJsuLfcMlogS5V1MLsJ7/m04uro

qU0zeQFBXFxp+UlxNlXge0B+lWe35KqoYg5FPaa+ju
5bu5/Er9YJ7/FQcz8MiZQh8OR3MZOJpk/5EfVLOalMskQ0KHzMc4k7yaR2EthXtvWQ5iOwDNVukcozNm

wgq1Pza8nuocuUN/irtLUEldkSIWXre43NLGIiJvY0I+NEWYv6p1oqxMv6/fMdvhWospse4lGs3hKGSV

qpyH+KaBrOjzvkTDOoubmyMbfzhh0x6u+0sunF3E6a
Y71SlfWaMfjw/Zcgbw7R0IYqFNVGkOxV+gvWX6WNzJs+5lLgjN6cfbutFqzrl6kQOgoYjWdt9+FesVmf

memlkZMkkncjeQMhrve8M0fVB+cs3ygSnwEvKkhsUYktEyZ7Jz2Avas7/qzh1unIc+PR2mHDrYyPYZQf

U5JpQnVyieyAvP/pqGUBT5sV6g/zC98nE5x3GWfrfb
Zre3kvVrp0zNXjNyqtzv83fAPjxyRF7zjsbbPv+pn4RxsxJWCLx/sT6EE0IXle/iRtcdiEH/ZMlF72mE

6AoBHyyp7c2eBVzea/0ea8hIw5x9zn2pd12UKmAMJhiZbSjpOzqyxkqVk8msvCvliKJQn5xT+e41C2gA

3eY5mUbNwYE9XgcdVx5ylSIJCQCCyNMRZVYceHs2sn
THWuNShtUvxfxWLNMP06E42naMz/vGT/xGjTEkXvm2E37Ag+mJ+AghDwixpiIEid3q7c7pWl8WsbEcfd

Yo29fbu6B1HTU11vaRVqxdcJFGvWapkDLE9E8xR2gIcu0ruQ5uOSHjRmNNjhd5wtJG7ASB/oM78ZPNeq

tcaSBGcn576miPEuXOVqR3/WzaI0KkVSWE3nePvIPq
9WhgRGw26RRWLQRj8ko/WJH2KgIpsYBvKRKxtoEPk7DB+pXHXRarCpIWvDfmKQ0ynbE5KsttghaQnpjM

Qb7+eJ31qtjwwp+EZ0l1zLFOu1wHm1g8sUyNQQ3qu8Ow4EzPV/yUhPWJQGOA2047y23BZdn+YGkOiBuH

O0HRas2Oyie9+5U4LWfWcWCrtd7J8LjLukiSCLv/Lq
pSQ8yKatTVNV1ah5mf7JjJGqJ780rogADDi5+J4Ub25ZY5JIsGh1GDUXUzJ5nUyTNEw8mdwqwZf/OnkA

k0dve834SlXlBJ5x6p/1BJ0yNzG1yJM82/jCw2Dt/e4fdFW1uGK9updeZ1WxqaHdfXnlbotaWdCMe1aW

/+GwreP5Q2X1zyBqvSWpmdr6uMz405vj5xfIEpgAEo
h3pNoF7eF8U6zXn+klnGajfMoXgmNiFlmY2tC5wIQoxQ+8gy28hg+k8kJYDO2Zbw/8eeHW9Zx52mCfbr

DcEFlxgKeHRN8cK71BJh1y6l2scp72BmZEpd1OLie1P/0O7XhzdpeFwIyxHefIqayGhgyhyXAQo2mJ9T

h9qNPks1yJ/Vikj/7HrizVM4ik/TBJ+Ct/7h9X3lr+
CVDrprsZerWLuN7Tac1nmjZl/BswcwV6yHK9KAOQagEAbqkotc4FFSbUXGZthzgOcYdSaMEFoS3pc8WA

av6ZdYU4VatOxwAAOscgNaVWs3YpL5ZAn5N0qwF7N85JQlUM4rDVjTV/fianEZfDBi0W3cqdRChzfoJJ

yB8aLoLVMalTXtjN3ga3UAvjL7YgEtARQ1pGO3zjrG
1Ejy6iXyfebsJh8yVOBj2geA2yidhhpm6+MKZ3n2lP243FgcZZGmhwlsB+urhVhWP/AnKYxz8ytpwJ+s

xaJRYQbolvtNUquqbl42IesRD+/RRv3S+BzFY+ocxRnI2Sf9n0ue9x+TKRgwXl+O0xHmWH90JB9Z+ypU

AY9rKhrd6pwVVShZSF6XKdHGaOlIrAJipwfMK4e0HX
S08g/TtCk00iVj3VCA4TeOp2tQ5fHsFObhrZ/u0APRGW9VXBDtFrhZQy1AM8NIYJ+Yr7evQPvi9pgwS+

T7G40HIoRF3dexoI1BjFcH68i6WvSOcVXZQIFc3KQYPnEAlIrU0teE4PcSX6aW/RJdXqP+y1+L3fvVlX

U8A7Qu+OgYqTKKWl4a8NKzHgDiJXB6Twy+6uHVBKVV
aic4eg1IAFczKJmKlJ8xkHfC7S3l8ZKqgGlwHPZ6+skJSzwaBbaOe3EH3Q8CrnUb+DhKqoiyeQ+cnB2z

sSVFtBMdk/kCl8/vCvTrLLq3c4pLUf5914USFJfLaXdE+me4aJWuYGh3BUhG7IMa3SYd/cW082PqGekx

pTh/FzYcyeZ297V7ngRB5S0UaqEQfNUlax4kNRpCWt
BhX2l0DB4xy0vxRLQa42m6rUrILaxlcLHTd6we++Wzk+gPDlXU5aDV8L1Mk/mjk7FgoURuG91N0d33pQ

fua+ypvrq711bPmW1GNs/lnEhKTAa1wD62nn8z954yLGW8uGEUyoD/hEpLoJJr1uAG9MPvFcBBPux7vI

OxDc2ebUa9Po01Av3GibfWU4CZlulWLrs0hL7a540J
gwWT5ixf1BjBze4P43JpXXyQJ1xzERj3uFyOTpbEk3etslhLig7q5MV2vM99HW2/MOYJqo7+NHt/ltDv

R/5Tfc/bHBFKA1Gho/RnVE36S8xJGNB+9R9XK0bpsX35liWWITupi6/fx3r7nR4Ny7HqalioiA6tLCtD

/A4a7Xpxbxvx33YSR+hlquYWHv/YC8S+cNgFmGYKkw
3yvoYTGSpy8uBLnufuqmvZxJ8dPy41/1qvfOaH+M/RPpgxm6y4E3QeVuY1fJO5aP228HYsMK33MEHeUT

vHgNo/9ue++hSYmnfo7UK1njsI18kf/2cDyfKAuHt8eohkxOi7ddt8jBfiraHgpTH3QSTBKiroqOBzj+

7TgRj7gfiCoxUwbjM3NdUhQZZalHwCckLw7ot50Dzj
v9MV5M84mUBoxaK0lE9OffkaoH0Cd6XK3yTsV0NjttUKi8UktSvC2r+JgEsEf0zVXZGCU47/ijZbex1M

wk+0T3uSAsvCTiUUhRplWHmXTYoMMmUVmWKxxIv/p0HGh4tGlRDJJSWvVqoMYvW95ATcJghqS/iore4X

rvyrMtUDxQVsmYneA2HXnJC43hQg8GVGCF0zNitXeD
naaBBZpRQGLOeYvH7IWSPbQIv6jas4jhJq4j6nOzahNPrJn1ww53XC683Fzv1PYq0XWVG1X8tCKwfQtj

7bIwSqbCzHdP9FNi5tXY4LcZnXqJMvnJl3n+y4bvaoHGmcO9MePuE44ThHASMNihVHA++CKv3m2IyO0n

7wIGnb7ihTD4CIdGuBq9kD0WnuKBfPH3UkcTySkaEU
+axPxlpLgNq+8En3Zeyp32t/UXUEuUfJIs5DY8sA84hdf8fSZnHePhsjmpF2G/J2faHWmfwRh7x1Zeqt

31m5RfkWPC2gIE5cC4/aUKehTJxTgeQEwGjHHEc/MSkqcBstXpoWFy1drnwvotQDAZdN+zdJoJkXPV93

r3/0wamnU5izfIStrpCeLEpLH6hZD4yT/jSziywOHt
RY6nl0tqo5kV56U8mwiMCeFGNMsKMPjVAoY3F2SnVs5jSWMrZsQS/mb9gOdkNWqWl1n7uet8EEJYelf7

FsHkvrUQTR7KSCcS96Un7cBPbZhyS0+giMk54b9r17Q6cBu7xrAfWERSo3WeUrwRiW5d82nTokWF9mbi

crWk4pnYJlbnw8zS6skSH76TNu210a/PvNF5UasDr5
6eX6O59EhZAdnWsYlcFhZ3VuAJsEQdLVJv2gSnQ7YGvzsSq0OwfCqbzaUC3dceOZilsTFgnhljAE3X52

qLgg5nXl2cmwgol1wdB5xhlIqwFkWZ9KobWWIddoPxXfqf+gkg1XE850QIeKMNDmpQUmriaq1taOze/x

3mZnv3SEXfD5O8DeMjryPiVwwbobs6TW/zhKmKrGrK
cad/HP1jLafd/K3fR5yJu/wA7/WWRL8LD88fU7GEo2Y/EG9SdoV1Jy6ftdvPy9onjhDdQ90hgitW5MhY

k2Kw6fsCzHoP6484CGLKyxqnk1wLAjy7de6AFMlMKVHTxI2SHU8qdC9OZYYPtZ9lbpODZ49GZLwJPjeU

di1fF7Ayam/Vnc74a+Lzhcfdgr1AXP8MLeNX5Aimzb
SBSGUh/vBzUYa0bB7/Kw2CbSjGw9B2QcRTQLfEZodwLvdlY3yirIbcw/vDIOfqYkYfyh+xKhSzKiIdB+

b/cX5LyJzU4VuNBSqF38fUxtzX+tycT2Cf07yEd1Oft+hEqRRUtQiWjv79B+SnngMU5A5Gq+fYho+Pvr

xEYAp9fzc7D77GbYCWL0pfM4tG8GAs+Om+PTlpLkkl
gEPDAP6mJZASecsHbUUPDVQJlIvgsJhkPZliU2XVwcawJtWK4GGQXuppqwwf2HaG7QV0c8LD6UdXBsZ/

e5PUpGjUBHROkjHcKzy6Xvy66On54I/LtabwGK+w2a/WRT5LgrMrj6coolrzJOioLj5YOet61TrcHeyy

1ZGh16sO4TaUXFgj0d657Kky5te4gidefLSOIWlwBN
nH3bw64pTXzj2UnGdtTP1O4vTdY4YCrgRYYaHBa1kpnK/MlgvL0qtYs5TBZf/25WsN7iOT5cpkxW5jeV

lf489W/eRkyE2+MqqWLcQ79m8xgCj/vjDaE2Lj0n9dt2yuwc2kIWcn5eW0IBK3LbvGRDchbr7NTs+zSe

huE8/K1EE9974BRebl4lJep/vo4N2Nxg7rBCDTO9XV
ZLN7hbChReYPHkB1jk/F6XQJoGGXjsJPWGxtklN4b41azM0POVRpdjv0lK93wox4VVE3AmzoBsKWttIn

Kr6NPqOASH4Z6v1RPUVRQh6wvFoi78qx925T7BqSaIrIlH2zBfyw5Zg4k37skJN+xrY6TOOIE+46CkML

hGWZ8h7Xm3iv/jVI3HL8gKQSXn9UTwoXKVPTM/kf0D
z6KJrSC3y1cxElKRQCGthTTGd54KVZhcgWaSHrTlQ5ag0/lmkpo14iGXxYd3b2Ggr/KjLYW0vG5Skvkh

eONm1lO6w0K4ZEu5tdqT8H8fbFmZVhpAqMTgZyO0ZjlZcI+im7i/w8Rw8raIIyISLtjRxSa8xhi46G2y

SS2yzogoMhvj4ElVpzTxZXOdx4zHYmd8VZuzO1nlFr
baJkW2XHM7id1wiKfHjAnTAS40PdTvjE5TxPKJMSNik/53nJFD7J2L/3Tq2WKeYCyOq4WUjo9NH1EinK

2I2YRpkKfG6Gw08BVSllhoYeLGYNTPHjsLms8f5mnpxTYmDKtWdj8hj76cBSEnWHAJrlihcVzAyefbyM

VpHMl3B1s+8T/MC71v2L8Mk4oB0CRWh3S9jdAe8GLB
aWTnQq2HRXsEEhEwk+ADeq3cYSINN3EWAc5Yas+J/vHvUNg4qzY6mEu/7D1LSJcN6xbqC8dCuqxRR0xJ

jf+WhRtVXVQfjLinZAyMTWmEKKzaHw4iSomAaxoZY709gjDxh5eZj4TWb9b17qN3NAywNiwvkhJbqW3Q

8LYQDoyzBRIbURlGwImqSCzDzbSYcIXMBg7BGsojXd
XkDOoUyHtT6cHbbPbsqhaQq6TDBva1tOWvoTIWhwqc92IB3rfV/1hbbW+v3f5cgfH5dPusPabhaT0ila

mBjKemR7+p1g+XuJld+CMDkO4QpYNcrKTXTS6Bgw6OrJnzA+h0R+nuyhrf1ba8IMfR83vYy/2Ob9jOCm

8Y0sEJh7tjSOryKPKO7bRHIQCZFpWVmBqMbk/x/K3j
KuuGs36s7fJsTTCN8AxToLQi6ME0vteRqdfGDdLwoLxd7LQkN627XUHxEa8nvAWyGxNKfDfrux/zrr3r

QVuWPw6Icyb+w96/s8z+c9U0tymLHv5d9FZu0Y+SsVzvB8NQsIoNkuDc+0bzGDFN6UaCUN3Griagm0SN

/8SON5d4BQbbPnIdHF25U9DgsiFlwof/TFbgh0fQ4b
3q+kO0k3uAF6MIZZ+cW/OHCNm8pbDtYDEdaa710iOtODUZJiFz2/D8462pPhz7AT9JSz9vXtabS+fLAv

f/5sVOokaBIGLffe9aP5DsjI8GrF4H7iusns12K0Dcm9dxX5MA+YoUBSo3WKvIGNrMctjpN9kVUOu0wU

31sLMmzvaISAjCJHD+1HWDcw7T+A8BE3v34Fg23L3Y
rFSTq5q2HM6+uKfwhX5qxEyTLnsOmnXmkBTFzL5bRgRaNZ2r9WRgvdnn8zFZne9bQYzfjoPQ+VO9ndkP

6rhb83880EkaufBPKHql2pgTrlLGfZgL0H+bF2YKckVG9jnvqjCOPuacwgM9ozl9gnHITuyUErjOaEhW

P8jtgSioJN87NmcKzqS86KW63qimOlS6QrgNPhzg2e
g50r6hJkRLg7FX3QZvuecf+/U/zXDP5g/min4HfWta+0HqmtcfDq08XP4gjKBmgnRn+9WbOL+V9NZ6a5

fYcz70uA2AAzyFHVSCaLkHYmz79kNSEvY7en7jIp35/pxmTRW+XbnSbytYNpL/tfcLjDJMMlNOjaIk8j

ajHm9dcj74dvGrdxEqWcU95vj2UA0hRq+TsIBEqu2Z
NAWNxq586swQofRQYUFVJqYd1N6zpjNZyIvsftePgBe/PDdPV+F/3nmo4JsxP5+SUCw/DTwWqNvCBJck

V7m0zus+pCa3//J397F/rm8Fz6J+qvl1lwHUFjfAiKZtk/J/4h1Hm5+/kskbESJuZdg4Tirt/0MrW9Ad

oGOGJnLJSHuGL1TklfkG7R3DEooiRl4PGlB1kICnja
jjM7efmbk66MA2qdbczM5+uh6kXf2xLcW4ZTbDp6vzgzZt0A7yAYHGl8O7SRM+8qvDEy2H8yzZStknDx

P+fWHjvI417A35lnVexD4kpDBChyaOXDsHs0ENQtk6Z4/TsVC8kHNVSpT1L8dc4j6RqMpF6ascGIjZrn

T6rjHHqfnfNTk/DLYZY3gVvZu6dGrZyTQ/k1Z8x2ar
AIwuSGnuIx9SJGOj8FjaAgL8Ej5NsThgd4/N4ycO0fZ+CPZH61rjAOdpiMvgNzuppJ8W5TudE5NvLhif

/Z13K1tfluGWTpdWeobLDM3S1VG9Qaea8pDpIeIblZqNQOlzi7Zo45IV97WI3pdoWjQQ11FU8xK/Ejit

Kux0gMUZHcwZK555q6LYkzkgl3yzRRMSiGfg5SMo3G
cPipXtjdC/8cjBQyqlzZM2mxhxG76Z0/GYCbeVDKaQUEbWFpu1C68CNUcjO8czMyyf/yz4GfN7lo81hh

GI/IcKOsOHetC3mtqqQGcS/xvbF+VxX6QtbKsAFbL/XT11xbCbo6JvyKdca95P4EZ8FpxIJr5QAZKr6M

Sg9Wshfshgg8By2DqNbh88zaViwnad2FY9uAbeJR/v
bW9cQtEn2fFE/FgsFzsfA559xfWlwvlB/pQ8MiqNxZUHM1lSxt7TI/nfAl2KmTbvz/x6RzGe0umJgL/R

HlHBxyU5SGa1ATXvKsp3WzBo0vjoAPwdR4bmoXAZ46l2Bjcs0dy3ibB8zYx/MxB4ACgEXMzM+9kdm86+

o3dE8P43uFdyljzaemfhJROMAV0uTR5n45N5p5Ig9T
XzAaasMUi+Vn66mvPC7ccm8sIXfEO1rW9HS07RPsjaQPlSryGC6M7F3HW4rj+3RWM+vn/k9ovDQQJ/Bv

6rrYbwOCTpW5J4rarPTYQEj/erd1z+aCGo8b00HM1b6QMkxZutmZ3p0SPIa+EAs3V7u1AaQ12b3ose85

wrvo2QfH1Kit12rsgbPTdCOTug8O0zaDiSA/36Fe+s
IK9COSBsFMqnjF5xi5gynikOknpKw3zu78CkUYsnPY1v2ouh5eQgF3KU3rSUTMV6A6pi0omLPrZ1n1e0

U3rzd62+MmplotVGJwSrGFDgPl97wtPd5zRWE/dTkPjzjyUviISB7D9uqWKmCfOWd0Lxt8c+Uu9nnAR0

cd1kfSl62/FMNX4EgcGZuDGgjk3uTdlKMyDNlkSnYG
M32w+vXXddpHQYqS+1Yi049aI2Kl5EMcDv3Mkj1DNHOs7ew0Xqa+zxyGBIVtBW2FKahyj8aIfblqXefO

MK5lu3PBOR5R1RyI9Slo0O5HDRtJSBn6oEMO+TZEAySHjdlZ8zA+w7sTendenKSQpRabuvQclPtpjUmW

4iPzG8L8beexmq1seVzSs0sdpIQuI6yLfHVY38xDCK
UcZEuUcd2G7Usrv1ANSKlqbY3EBDdkjbF985SSVuhRC1FlTcetLAW696n5nIsfrJj8vkpUj1FYjrrTBm

GRnHGjGO9p4hrKo46GHvGvZvf5lV+qN46tMXGo+PhucanRKBlQabeMXm/BggZfyb+faL5cOxyHswNDzT

ETNA5RlajWxraQDT1PeNJXOyp1r+DcewBEUK93p5Cq
ssePqeanp6x8kmrrAK6fg68WNF/Off4SpfO44VMW0YR0U1I+QfWc5Ovo930FpN6kWidrMUYEdoom52aJ

EgFT6tCuEGcqe8e6GyjEkdLIqufDEe7VQ1+rLNQgrRqZSEaLW8+5g3NNovMzOHumVtMm097DLTXW6CBt

m4YguEFDb+K3qKZ2ZvBaNSUQ+GzkrmQ/2aN1Wj3G3i
tR1qQBcvHlI709J8/jR3GH0ow7NOUolASjut/Yn46zG2DX/KuaMOQKg7KaXP/EOtZ0hjqio0hVcFbMqe

Rtasn5suLWtXazSjQZQhj2bH9jfhPO26lMOk4G85mfagSlsBe4RAcsFkuNglpf7GMlde023mRM8w8svV

mNl40JXosuNzQnNyua1X4Ek76eW5OBBWo9w/OirT41
U8ZpnlmzcT5KtfCVzK4UMUTIt3hnYjodkyuG4jzAOrv+W3UWaaiBApvaXQMBagqUqmZErFp+Shyanne+p1X7

0Y8Yl+ZaLGtmFbAzgWW0kjGlF5s+yzepF+8bvRmtcVVqPczU+Xs3zEOd/E8pa2ntlZYly72+cLcK8t+m

+0WpmYqNZslMaCwcquCWRffQFoJDheBkPuxGxJE9/+
XaLUJFR1CC1BPCA5lmM/1r7GVbY73sDEk1EUSdeis0TQpN28g+dAXq5GWuZrE5rsXOjBd3vNc4wCD2HB

xmIU5zFD2/2mv27fULiF35aC3EqJ2mvwKKQ31rUgd54nS7f16d0EwwZC5nvqtSL4d4u32v6ehyfLPO9D

oE/+8412PdjD3yj6Kr4rpyx/ilIv5dVc88I/UE8Jdc
AK/Hzy2I/zpI6pPQFxiMK76Ca1o0E534/nO3Js98cSp9z3YusnhGG1hloBLdlLPcEH1q42ijFY92NPH0

RSvc3lJuWHbiBkqOzGR8fNrKxnMblczKzBriLV+2/WAJdhaTHa1E3O/dJP7syUAEkjknxhX3qdvA5SFF

YobpXcIFHNufQgKe+aAh/kpL9/MrKDgK9o5LAG/JSw
r3uW7B7jj5GeBeH1n0P2mosyat93ubErcmSeJH8UVi2M4SaocWa1mNiSe33GFmqUOIXatlOzHGs698Ig

ASEPSz3/Qi8h06EAZRHfnO6+9dqlggN3gJGB9kqgmczlcLmE435H7EJNNBZ+NeS0p9bkzJHq19c7I2MI

S8ib8fgXj/SMnHhw/YYqr0bypfIgx7kVc5o0o8jP8G
b7rsjWCOeLW/L5Qe6A8tv5kJ3TvrU96qq3QAXHHeBQfR/3zGE4ZDuZNFcGogESDYrX5ON1/htshAgqYd

aDFxS1OfyvHExR0rdttq4rE3q2JVoMQ7CKauRG4bXV9FpY6iF7IKz1wK9cxj7VLTNsM60EXYK4++9NK4

2xwz+o1nly0jXSZYtALS9GP7sys+yhW6dDc0/tqQJi
L7c9GeXScc4gp41x2RAa50CC4bNsJTDYXOT5cohBLcR1cqImZ4heFLVw5w5zMjz7Dv09a5mW5G4L8KWo

SfWUV89tvZzTU0OT7L3vY/r10hJiA0G4N9V0wmVJxPe3vWomd6UuCsvq6tWF1Lf+1KkiKSgBcPi075p2

g7RIntDSE8iZyjrslqEMecIgRPluYbt7LTBEDmaMVI
TI5sQVpNGtf//9ZmeyxElzTwopGQxymea6p2N0anBHy/6HXx/0evbP/f8IukZwBnMQ+RXgJMjC8v1YC8

yeN875IT+tgUO6udIQV8bq1k6s8E5oZr7ILetv6cGM6hyYPfBOASpoZOS4/604//ZwAO2ETQKBzYbwsP

NAuSpZflaCicxuQNnqZH/mJi5WipSSi0SuePucsOMU
RaYhENccUapD/vBdwJAkFn3gkn+2uqfkQTWX/TgSORhJhJgbpmunN2e1Tg7VfYAaeAHdkfR5SkvjRFVN

yiO7JJUvJVFCDxa0/2lb+Nb8VnxdamVHKhPTkPILj+akLJ7X4xZN8JepYyUm4MT1S/OjdGoFx4Z8xHm4

2Hv6ezN9/B5fVrs6QUW2uMR1Rzvt++CtNSr0h7c5T+
ltttkS+80pVi2mItXQ9oy0vHakzapaae3CGdCzEciMwt+8K7BB6dmdu4smxUAfOdM3yh8zUkkpfZuNDa

z3vA7PnxXzVNroP3onhGQcZqeIOaRhs6TUmi/6WKh9CBh4hAZCd7x9WO3ZPMuwHctEa46LLcYYSIovbK

G2EJ4Yg0/4vtVC/m6aRKj+Pb2EbEaLOCI4qxQUgXOn
D9t+beNHydZprG+2S3ekAxFDsEW0r+d0hv03FI5rSX4bxlJiWFv/yFye47pso8HVyzSu0HRNq4C/eAt5

L0V1IdrCnb6kea9Lw4pXuLlD26B7s5m144bY1KBoyWHclMHyNiS5GhDjLIBC5Fg8Y2TEzC0C97JnaHyl

/Zf4J5zGRR/8nGorTC+yfyFunOPZziys0wc4sajU+D
7O48CxFg5bt1ZsO3Rqe8BvNqtNQhoHJ/Cw9rw/1o6zqAX+41xb9qn6OZtOGceLXIM/p6pY0aiqahVaGh

Rr+U9nWBUn/vmps/ApxQQ11LC6jxfEGSlk0d6+afXZXNZ/bMLipNE7LMjM05fVZVepAVjchw9gxqm5bg

/0caUFSnkdn82UruuXCSPmxXpRUhMr0lccQ7fK58WO
6wmZ0l8e8HIQJRjgMRDGBB4zJUYT8tRVdzi/PC/NZff/60FrrRZHy1JorZMNRD5TcxJe7XQJ9pcmo+vd

9fl/fJiDUpzWTwIT585oBPDVzjVi7J4NfMP6zXwR1VBdjJw5n0pIkDKtEWZOSsdf/cejkwLPG6IDzUO6

khjQB6gt8N2e069iIStvLejdgzPjgGdM/FDpeXVBxT
kVwwwQigCHZxu2cFEfEd/XMDmIP8P5DXgiuYVpgnZ370fjMghD66ce81aoP1WfImbJ/emdlqYQ5sZStS

LnobB7WTZcZ9WopdB3Sxy1v+h4WxzcauQqQ0mCqJXCNmGfru4WcyLEt18ny2B+MgOg/wsSa7/MYd1UAI

jd63nTwRxT37Rb/TX42NK3oLomq52vsVTdbl+v8sOA
aq+oaclBuAvmlpU1lwy7D46kBj8DmnyX27zWbMM5jJysDdEdSrE9o4gjpjOYwXbY43LsDDmYI4jCV7Mw

Z9kAhsE0sjxZ2VOsv7D/tlcTctVVEIqV+rTB/AU19y0mhHgErx2xI8UySOU6StavcR2sm7CBR88ha7Ix

wfhytmX8Y1b6zsfeq3JRNaTuvHmKkYesEw96hfzcOa
uQ1X8mSIpzWsrBd/W4S6wn4Gx0t5+pmO01HSM3GLijh5xx3HenbnI3A0eQ76NkYe7SabQcM8e/NbcTxZ

Ysg2Rgto5YExLdXzVcoZttvZ7sfLbapuu1PctUImfseGoaTtWGjawoYT0v6J6p6LmiFOj3nKGWSiDj0c

hzoiWtuM7+LUsgje/FO6CSCynuwbYv47Hrx9RpiRTH
L4eece8Mw+jdwbh1/ghVvLB85z7APJ22F7FdsEhLQwJVVwQ3btVV4C4P8cHoVswC+tonPXtpJQ0Q07Ai

+8St6JpRpyzS6OI7ucf4TQNui/vRMIAz7aAtqweb20qtF7nQGnywqjjvYNdyIfD07qvD2+rLJb0Aes1m

RS2KWq+qnnxhFQOtNtUJ79hjpbpsnBHLHJMgXae6eR
m/1Cts4ntgGsJrbesjXPaUbnBLCIxdJkCC+IxDDjz3+hCDH6soKhMZGuEI3N5rdXm9Ou8Mr79uHPtjvy

ldIl2czsuZxfvpPEYUn09yXEx5aZS96gT5N5sBkKeE320i5ApZu7/rAous1Zxgpm5obSLQWAZ0mimH6r

bXq8Yg30t9C5StLDNzLRCp4iQvGZejcyAARGnVSXmM
OibsKCqTlq+s7m1bFB5m+GHzBajBL7V0Zx8P7IHZczjEy64dh9aj5NJ0XTMhnSl0F+rbb4FbEvy7m+W9

6kCB4ltZaeI4YyIy7UeotOE5SlmYPZFT7CUtILVYwRdQAKJ7DnNB8Om6r61Y8RI6qxMUl4eSiCRyckAq

7+FKe2tllwRbuf+0WXnr2AvmTFJd+wt7Mnu+Sk9pMO
oZabFDPjhi5R5lKd8IIuie2y5GOlBnZ76nKA0SFh+hZWHqO+chy2DMZP4BFVhG/Gqjn2AqGyCq0kjU77

b+NzD4x0HcFzaaX+5/HZDNCrmtqDYNUszJd9i+2grZA52/9YMoHkmzLzRLpChl4Kk3YrXmQeHXLc/3XW

HSMX8OKZUT4tc2fbjYyvP1B+icopVsjGo1BkMZ3F2s
WdoR7Q60KCv9B9jmm4w1R3cf1dITPwLee2idO0k4OBss1Bevz3ltCfyGdIfAMDeoJ9C0nfAtEDY5CIe6

xfwTbJ2wib1W3QhAqkJIFLcFT3OAtrtALo67x8x0bUhJfy+GaPq+s4QUXTxegb0ZgaTdS+NWpoPMZLby

gE7qtwdBR/O3GB2qIlJpFMbrWWGZU7qbFL1L9VKxYG
6yOvx9PF3IzL9Rlox4e88xxVV/N+NBtzGg2+JV5S5GL7Fm3vJhJx2zkNbQRzNmM/xjBKXLL2JrfYzmx2

5o07wMaenK8IKaiA2d/dFfAl2yFldf7VaFz2lD5bYTBZA4cc+m5ulpPTNewjDN/NJIKUt/uaOXjJyeWt

aXJqXDoTuge3dAibyTE0j5f6WBgW6bmt1CX6anIQ4U
f4VUJ867f1z7ExJEI+R6TD65zvMMdmCC6w+itLvf3Hcf/yWGyw/S3tX2r7GqXGZVQCUoEdU9dRrhjj0V

DVT+Xw81aYogjenT+DRExUMdetHC7byknwwgzSpt/ngpds8+uJajMkIuOPo3HlN02vagSaB9wi2zO4Vs

VUzSCtIYVF5Jcjt4EPUko870u9C04CG98rYHHl+VqG
YV8lWQh3kcVGq7cRo7cJqDRk63ncA/6vbrFjEbrkGYS5WaYtXYBNem9o3w0UU0O6pE1vlvbc3bQAnogL

uGqwU4o8sdlZJ/3yMZFHbIYEeh4CCMiZJEYVaRzMv8sw3+WZC5sXQHyOURZ549JFRFF1etMx6enmRdLj

tnZhfNX+hVbzJOrAOMEj/Q2CeW63ahWezOTPH/DLXP
ShovxOP0zcZIz/fDmwOkq5D1Dtk7NWWh9HPU0SYPUQMeJOvWn8URb2E9t9TMsHTlSTrqavD5fyQKgQL7

UF5GZ4ZmxWhas2GvW7NcuoQ8F5xA1uSu+r0dbFfMT0ZNAx6PmXUr8hOVjRyfXLkqvtvk8WNpQssI9rTb

31qWVU8MJ/u5+W+daKlr1ccn3dRr2h8K1ebeHOKOOH
48nNt0/wllF6Uaj6hseFMR6YYXfhxTN3as6JjW8hBdyD9pXaFflq0pFwgC+uyZrnsrRhJgvD71A3kSjS

4mzYhyzlMmUHOrhXPH+NDSexLZN9Yl1NubkBW/VOnvZjUttAHZI5Hwk9d/yaC7CL6WXRosZuNBFptmP6

B3lAPilUrGXMjD6BZZRVJboQSFtsiMrVElXIlm6pdA
Yn0f+kh4+l3OJDg2QpW1Q7aWZJo361iUvAR7TbA8PInMSVSCPUC3hqVJ7CSMP2D82H3h+lUop6YUS5v1

MTGFqvd4mBsXUtyvthkc/JIgjmzoWv3Em29goB3+gcnb5DYN+3Vd0D98nG0UWTuA4f7+zBpdVtwnoDJv

dsha18m40mqesy/nOrjcYke551camxuapdKNcec/oJ
QCOMK0d6OoLlL7CrJ3C+H3+vjZ6uZPnlzMmFhPn95sXQsuiOafogNLr+Ci/R+n89JGAZBnGxQ37E+UA4

bZqR949zsToBMY0WvfsfILQXD4iAfpmSnMJtHSv9N7REXCWo5j1hy4qEy3soS4R8gaoV5qsuA/UlzCL4

VJHWS6S6kCvbl/SnjHXFQnsWbLkYhgvY26vORy3y0O
RSbkqXAXzHuf4OxTuWhHp+r1svRk/Qcv0mND0KFid/rz22Cknj/S4i2C7FOKp6y9yy9gEwi4hdj5/StT

0TQ9qs9Ses7Ifs8Of7u57iLrfYfimabbYg0NRwOvA7ZHKRUFgXt0DJQhOt1dbSG08RG6sKBuf7p3vTpb

XruUQknbBhf8eq/Rbxz3oifZ2wxolMHe10XP5+ZGt/
jLxmBF1MY2PQZHCIEO6RCYxRIUrIDz9oFWOvwi/HPqpBys4jg/iWvg9ayd38JE/wD1vutxIY5vz7l6mu

4BDpd0Vw2BERZYorFay+h1WcuRJ41nO8JI1t0eJuc/Ru4YyroboYEtIOBZ7B9yFceZ+4GU0YCri5qtvT

kCUS+3n/Ob+MZo7XbV4H0kpxd/jFu9MZp1hEYRRRay
ol6NQBt2hTRGDPmKZwjJ9FODcN1L9agVp+58p8aNsPkhk2dXFGklJRpDgLO0o4o1m3gkF2QXS95Z08hN

xvNO1J5fRkDwCtoDexEnk4oFuKMorTEUDD0uT9gHZsjsygUA7msOjBzjtv9UoY4c+/Y8qVwRhmWczfke

aNqCrKCewqVwx1+kJXVP9lAik+7wS0RUVnbvQW6PoS
d4hxNShWymhi+elFsfJ1S1Ej6lBsF7JVUCQpVGHRG7D2x40eYIOLC8K9oC5b7M1PA7a56SAMuvpyC9T3

U3dA3N1V4v6omd3bf3xk8d6MHBXR4K0/6kn2SPzTIDv8bVP3Lln1qmdxpd6R1h0XXiHvVc/gMwCvfZTc

EZDvd0oiZUGyjIc1kbc1uKP9u69q//dKRE99dSN7+e
Qk5b1zFxfRyeflpq/K0b78lWW59/BzKVjDc28BiwabppP3oCH2LReLGYulRc1EnzsZfKeeK3A3WN+H7w

P3/w0ZlKKF+fTSBWqmen2oCvIJRqVC0yMHmUQV6lIfSvcI/yquOsB0UbyHZfB5Mo1BeFogZ6aBTzWR+L

nrMF1B6u3Cr/nEGz+9emzLLKuDjdC1+5OmcUpaH/k4
vBIU+2Zt40qYAVz4NTy1iLkT8Hr9P5BwVlHOoiMHuhsOeuNqK7mouI86iTB0wVPWlrwgGZZmTIfy8Efj

9YOVyCYmPnSHTEJrJr3zuAQgZefUv82sOAL4RTL5/NG87Lztxwdg3MBHv3Cvx72K8PB+g+sXeP+SbRPm

zQdFs3P9s/JQjcqrBXTT1M1JTwtHtOR23V4Qd8kmgS
abzTYlOwATigTfYj2xhNGzJZLeU9t8dEYleHhi+IMGubmqXx3IJiRF24mGutHqvW/R7c/nzeOklacrnT

hbtLgMwk/R3DTu/zS/0YQaG5lCSEQlsW/2eimzlIln7P0NlD9KWcb+zQbpVk0Xob52379Fm036mpI0t8

TVmlSRuqUp8qTuyqvC2+ePd14RNxj/M98iOiGPazo7
IpPmhZy0tin7+G1bzJSmMAE1mSdtchO+4buG3rQbUha06oUhdkz1dYDxfYg6YLtl5xH/9lHPzNXv9/H5

zgdQEbEIdhZ6DV58ou9G1zgt542gagTTsEdDzLCYjw7AZY3Gcrxr8AD5IEiZO/lLrTLW2CgsU//1rI+f

R+GTlgfDlt0E1+yjrSyRuqbs/muiNAZwQTRxeSJfZG
iFVW+QEfye/66gaUvgXZh+ek/GgBspKoIfgZiLe7nPlYSAnmGE81G7s7JtPZzcGLhImYPiaJn+tj4S47

jHQNuN6ooWQzJ++ka0OBRlyIWOTMCHHMzgkhBmnuOvjanb/Wx5CmAt+Ord6jRYWfUTFD/ppJS0jESI2k

hf20ueV1/RgaEk0qguKP/unxEUsrvZLNcyb55yQOSh
MJ7htuWf479c9LsFNr14mJ9Qg1dqxh8XjTzx7xB43IBH6g/osqHUHjLzBMziSaxy9ftotSa3d27nfabt

yWzd2TldgXeJkaU9le5YaDbMGAlOqm5zY/6ltGgxMuEUl/JYOFEWRISnypy2HOEGU9YaUcF/KI8VWLum

bMangQj1pjNBsGasNa03owMGJuAw69mhltyErfE2d3
PiLZhvFsdGHR9700MiCTpKYsA6FHJObOsQDvNY1Y+César+lyZJV89q0nEoLMqsOlqJelBpz22WHI5Bn98

hjnupW5HD1J/6zFKGJQdpIIGxTcnceZg2hhmkBIAHTsOeWjKnOWZgqPEHJUEL6M+JdepAXl/YzbLPOTg

YtUYmUCiG648zaiJ+dw7R7eiwRZwnpzLv8Jq9ug4EI
lnDlDfzidUGMYcgZ3b3hYv45JQY/d9bN90NQuRtoI5mIJx433cn38sP/u/bDDtBUQl6jqPayjxtYlhwL

TTnlVyxDW5vZ7ieHpFgMINLZtw1oKkmyfz4YcCDxozzbNHSmsnrpN5scbloNw3fMI+GLEZaJkBtZBt2A

imdhFTuq8gAkJVFE7PnptjS49FD4VW5+8PfN5pVVOk
TjEB6cZYbIMkglqe23jKFxGAo9Ug+imCuY/FlrndbYJcfKPxfReEaoiTEHfBU1+8bUmBO+d8dxcWDzat

IstmKmb4B8+6xFX7c3FhQcXsGEEwF0tVR5j8iegK5/xIN5hE2FtSQxGL+C2b6/pyvhT9fcG8kQ9IVoaU

cfS6eZ6Mibh/qdp/ZTnQsBc5JxaM472tQT/Enoetb9
u5I1O6ikboadxSd4FP5BhQeyYAVefsrMeS6M2D5eZZn2qicnR/BX8Z+PFNIOtZXULlZ1cs83CQdsJcrD

ZmEOP6wDxfv2umJGsejOYUpdb4w8qRIwG2zwommM9Hk/I/Nv+MTPC5wHK4PG894KoWpxtI0D4DRGtXup

ihLD1sD/Ikrj3RztW7sB6adcCgzvEjrlVHcKIIU2Wo
op7Tf/btR0JRQdqr5QgepTgOCPEVgBDgSkwQu/3mAUaZ7xKESksgwfUEETIVxcha/LJRg6TmMczgI/Zh

Dbm6vVdpPckSqznuDw+tekO7hSFhjGZRx5+m9y0VFBXZL36UF9pDDwlVdAvskyEcT/O0E5/IuDb+9G6R

qTV5+w6XG9O3UQUmyCHJ9IPBJMkW7jbkb0OfOD235Y
9vDWFRp/rr8pJSEeA1BT3EV5s7m6rsV/f0MriFLFblHYWl/PhDKRrgW9BUBWNTA8elBViytxhxNqkfUF

t2SYerfjetTAdsM+EAu9mFkmpPIsa5myVkCMT35x7LX3/NOroLYSlRWv1IoHq1K8re1Ty/SGQbp4hoCq

jrcatpouv58jaTkgJKwOOeHKi2sorAcUiGS/bXbKXS
xuudBmW4HGCv12C/g4q+0NSt0b8+vgEoIn7pa7N8xWzc16wv4erPXnSG65aaQDH2lT6ubnYigxtEoK7o

y6MNNouzF6OzYPSPdGF4pUI7zZsM20m0jG9/hU1TvpLeit9IDJoY1MOPvIIRN1QPSBcRl3m2pkDShul6

T8IigZq0hFfWfKmgrG1CUyLxlnak0opgRIidz4WHTc
tcqws07XDL+Fgrw7dc/RR3VyZR7KLSqBfvsslvoDhso9Es1TlE2IcMHzEAi77Y+8xF2IO1W2aF3TRuTp

gLNwnIizYKGly0OsAxx9GXXUccPJbwGEjXO3oEjgPKatn1eLfqIrsQUq4kRE4smyA9VGX5e4CCKa61kP

Mills/AGIGOVZ9+n1Nw+9CSeYGRDJL95X2muGCb31hUNE
mukpEFHTMX3azIfzWPWNEA5JSHwuyYvlWu8/tekTUx+epNnu+TB+3PjzCOlHR4yEhtSgMMAm+eBwfXgy

TQG1Lpln7gLOiH+kyLLVU5Dpg5yqRG/02mhPNUnsfsO86CFE5NKpEqwY21JgnRSce3jWPjLB2n24ywOu

Y2pQjh4Uabvi2psmqeeZnVM5I5QH+WYwXPBvD/aG3F
3g184SGOa6uFKPU/OjrjsE3+vC/lOjP1VHOZsoapAFRAyuTnGjH5ggc9aR2T4o3f8Myxdsn/cjaWeOvS

qcbaoq5HAiwyOGvxxw4/hsC9ioMzjB+2g4DDqeSCEC2Oj8qEFJkEjgEoGn8Key/I3/Dl8R1gjHL3tJlb

Wc0lUG6MjNiohVpaE7/0LvCPNftJ+Esrg9ZYtNMqmO
rQcU7ydChfoDfJ4my50T786M10cdux86+OvRbUfatgV63lO0fbI+Q0zra+Q8c/kelgJZfy34yu0wfCr4

HqppoYRw6+QZdD8Ba1gsTcQ4aghVnrGx1rPXAxuzqFDIiZAAQ/mVn/QK5xx4nNwk4jgsgkjE0F8pGF+/

OVp1rFP4v9wx38a3zkxbh3G2fOxIqYIua/n2raXkhG
ZcShWD4FOMmpVW0/nTssjuvTVwF+uALEy7Wr4htm0DKM5wg+bTdaN343W5cPxDu+P4LrIGiMkY6odto+

+t8ZcjNdkHrkUbieurMTcHaYek/1MYTOsfXT/k3hz9b8Wm7iL91mahDeUoLRu3Yl4bJwF7W8w5lTyL3Q

0sZf2ffv0QtXxj6Mj/BCSEX/x206i7awKdNXnK+8sR
DMGkfoJ6UBwhJaiVkE5TPLvG+BJgw1174beuei7wuhXgKD7QGaEiaA32vLfgRt4z81qhXrLkGWMZIOYo

xzW7z2hLo1H2jVQrVeC4oLrN2fFrnsLlkuJGptOO4A6y2oM+zcSD3Gcff+5QFrhdRc9EAeHPXxWd7Nfe

osiz2SzW8+CKufa3YOURxPIP6nI7h12fdK4/HpKR9o
4YrXmbrXOQh5iurAJ9D2fJltz5cGj7VCK5uTuepJo4U8mxE1iiCV2sZZuL1caYPt1KCh50pkw3T+j4Jy

naw+JQc5yusK/hDpHHD804HUNWlCvb1NE6gCaKxbaVCP0zfbba64JOCPqAq8aCJzH0lKu4g/AQ/67Z02

iVlAAs5IOnQnLIlIXogn1J8jd324lM4SgPklZeuy/g
bwI/xCyCjgvU9EWPyvrXhOE99vVnziK4YGgK0jIiLP86yYVHF+ESgPd7yqatcnLEXJY0fdaLMtEKkwmZ

hkPj8vOdvfCczgOID3uSzuv47obLbMv7be1FNdx3SlmceVvo/CIYYKcUjnha/L3kuO2P4an4Cr53+zQ/

nl6/rzX5iKgJxV4buB8fakA/Gh9CwF8/yPD3UgpqPO
a06BFKOfdEIAjGLLbMbjzPnV/kPNum0E5zuV+mrkkN9fD+5EytTAT7IWmvqcrk7TMkvGvrm/1pNyBxvN

oz8TK3nzwnMZsJUgoJMXDmjjGvwWFxsspGT+T580Q8aVYKIGdjlunltDMKIvFcipxxIYYrY9dwFUChHx

1NAuuOYuHiOJQWBKKVD28YGOc1VTEG42gjyuKHTpyY
sxIPP2g/9e7HbY7tT07klRAe8ItjJxrQw20VcGk4KbVnxtQatR1Fk/iq7ear4F3/eXZPNFKrJhc21sqH

yA5vlR0isM9wE8bI+E0OBpTPD9kgJovGwM3gQfpqLmDeZ87fKNn6TXMLJJoloC9WUa2cn1Aw5mXxLZax

YjsJQNEqjUaGxTE/gInUji7zN6OO178yG3ynImkvgp
G/FKDe6dK4KB4idO4HqN1+QbHAXxc5n8riKYTuYHE+tlZk6+elKWOWwENzNDTgOIqOW1n9w+uC2cBtPk

vQYXrHrB4m0F6xJB+hjwoYl2ku+SgLWLWsNqteevZhpqTlu+06Lo0G1C8aYvyaSuYdcnRgTNU7mbWTS/

ADMpyFUtmf7x+lNK2LS00LlmvRgKbgbCb+KZv1FxCX
A3Ta0wmf7cqtuajzzcs5qd/TU/s1CyjhoYB3IGxIVyAlT/U20LOzlzUd4dEqNRyJPyU0GTGsfvvvqFDK

BHLFjUTVjIrBUnvvarQN7t2XUQIAf6A/Rzys5HbjH32pCEWl244P6Ha9jDvWlvJHeB679/CjNjEToPGX

8r7rcheWD4ZJnMywd2LuNUO2k7NdrTC2W0QUlyEBvX
VsLUsaZxhLqTHscLWtPDOzR/Tg8JUbrPTf/aocL6A0f8WaSZlkspDSzmFfyJ9bx5L4AzJZ2dlO2rXRIo

dmXSdsx9sMc6frHDt3wtlw4q7XYUObz+rLJPwPb9okiMNVW88A1G0dnvhOec2sQzfhcaHTTEBrwOwM2Y

JhJ/FEd+s5cvSlv0pUyQKSjWv2uSFTt7mAj/0r+l/c
4N85Yw1D1NWbk/k4xsiwGd+1sEyqiEZ0Ajacr8bnJkRXj0xnavlk44F2buiW/eaP5yK1rohvTdiL0u6F

8RbGA4wnLwP19HgBuNszhrq4+zC/wGsEqN4+/NvAGgHOP+Dv6u22di7hHmfqPqpUylOR8g6VZfO3joiD

jReqvfJjYjNqjMuzqWd8wJRJDsVnk9yQNyb5Nagsay
vLK+i2668IsP/Wn9BMHAuGBJb/Tc7t8AlHPy1pj4Xgl/RqL64UQgN/WDmqagDVQT3IsnJ4D7R03WM7LY

nioq9tt55qaJzOmT7J42acQWOAIfS4QGvE2paemodHRE32mjpt9CWg1MJq6XW87oTCYjTNh1+T8s3R2Y

4j7geJ3ToD5ptgFz77Ms6wnRX4hUs+6hVZBtUN3LrM
D44JO1mO/k6tjgAgNkmramHRY4km63zzLADIJq9jZidR1enlsrp4ZSoZModoTsKqfoFaOBziRuGJmS8x

nTKG9rYjBlclIM3CVpobGz+dvCDeYaYL7qgYkuGtrYGi1ibzUyGw3DbYn0mluengPjutV9jw5NUwf9J3

mXZpYB0UDx24vR2fFavdVFI4H5wRIcMVqA/I+FqaUR
YnNbRDuszm5YH0mwP+PhE04OezEtKqB7VsWgLeIrNeSssNLWRzHg1YfhSgsNCxuRVLF131+GlG5WWMwx

MJirfhMw4UJ/B53ecEnGbfJnhzgiShxNwIK6Gt0em9x8k2HI1hyA7c1pZ94ldRpTw/VYzCKoPY8jkg0H

TZUHiXS454cUveaEDcfz4JA4tVxkVWWz+XccML5+9E
x5b5pvkam+Hn2b5RAI0JSKBNY/ONDM3lE2Hlp+IEHH/fS18JT/dFLsukmvM8lIuQWmR1CBxv6iC6q5Cq

r+fdHxlGN521H/TzoVGCk0BzSLxf9BSwfQXgd1WwJyRDvFCU/GeBCxp2n1vf9BYKJ+YuuL52NYj22HzN

WMALbJ+aAP4C9SEtJCSE8ALmeGVbUJjj83LwGwzaY+
r3ezv99DtBzkcqyBKrI/D3Yz1yrnfJsFJddr/Vdm4ypD3xraRZh4JqXq5LavjD0SjJd0o8w35zeKP5k9

tvxgrhsta+D/NWepHsgXXx9H92Sbr+IngnsCcpE8NqEs5fHjRDS6vUZiQx6yGBWVZiFgp4FmG9WbvP1A

A8VtHkwi2zAYYtRQoRfrmAURQIqLZ/PoXnG8ZV1Q04
qcJiXkxgojxXMlc6Q4HjaECdD2/SAX4kKAjv6lbEEATZ5t7nTwZ4frytQd5EYhSF/20o1gV4/dNNRrG2

fd/r+ykv/1WvQ9nG0o/tZkgff6pdM0Yf2PJ4F7bQ1rK0/CmTiEtBC5ZYMv7G3+x+/hYZELrgtZwEqva4

N25xq0osltQo6n4eJBb7WHHCaXQhLx1G1l5ubJgQ4M
eFuYobJPIyLaObtPiRlcfoB+S2Ny/0wGbGVOsKCdfWdLZr/MvGrUQeV9Yv+pDT0tdsAcrx9f6V2sV5+e

/ZxZ3SfCGSsVZ3pv5gHIu74FK4+Eo3Lk55JH33ny9Y9JAb1AJWxqXBiA8zuCzEe5QaIVUoh9koYdfebL

GV6zIvrDAPuWS2gb6KtQZlbUtyNknDTV0tv1HU5x0H
WW55ts7xOl729FT6i/2nYRLhDNIukdjIHl9az/hGrA33XnsgN8fRUhnQP+8Rbpn5Ou3cM0joe5ROanRS

psbXkmE2AFjpXVKmb4xUZ0FliuVegT4LIeZxpxc2re8UlSa5zrjwfUalJa1zgdfgftcxgq6ypQYpj54p

g2aVhiFk8Ga6wv4h1JQwomqzExjio10WOyiu23UEw9
k92b/pmPxJJtqJpOaT13YD/LdFvxR3SkzurhUhoXg5UB+KpXnYjTA0DalDFX+QVvqhihNS+Wfpovu+Ky

zU0d0TBcjSNAaBOu0GfXSvUB1ktsZPPOq6UFLz7mOtBJ+YvoeshpRkfibxKp2DFkP9lCqODZhQz0qvq4

GqQKDr7r+b+P4FRg9vVm5CUxBK6jWE2RFge9o1cuif
F8XKtk4wifOJK701UZN5ymAXaZ42VQIrDihcofLBTWU6DfM/i647pd+nrbSxIC960bklY49PEpoRQ9yn

UPw/FX4vSDlPs8qOvMFnjy0l38TK7/DTFFIuNsFy+Munoz/76i3dYqvSuKltjFqbtDvS78In7VFQ+vU5SK

3VaGJSykNMH1TzN/5El4JhWE3a2bprJIoGbD1Cgp2o
a9w+rqyyL//Qe6krOF7K+f75/c+KSCNaHIhDaX2ty9sKFaYJcmNE6u1CdVru1SW978MK+ii5KIjSIzoV

efHscxlAk0NH284+0oDIIIq0ICKSZDWvuInoFmDtmvjJHOnwdqDzrqj58c6oWYZIb4sHZIFx+WpDmAk+

DESGWucUXaK89+5lN3HG4Omp/iAeBFiS61sfkKg1fw
FJC34y0+J9TNA5iCX+OzRNL0+t56cklMshaRVB0mEYcYH6ggqO+2nw5OWsC9GWD6y8hjzK1Cd9D8huDf

ST1V5/dYldFTTCOl9j5uxuKOSWEt2RAZPLp1esbOAOalp4p3WN+/VSIHAy2/EPNgZ/o25EBFzhP8Qb3h

78Z+rFzVxz6r1WLZQyruyhxLrkYCdaE7IYycspJCk4
p6j6fgQbSik91N/GFWc+gzUOmvOAk9vkVxOPHx5GGu7+Zv4s+a2UMqg+2RTp8jtRLRoS8hb9a7gsPzSd

6yoYZt1F2Z+jo1vHPgwHzvccYloPCNh2klp04SbzSUswUG3I46YoWQcrm3uaDrBdN/nSqxOSFaMd4d78

r+9aOKfoxicx3dzF7BlQziNVq/QUhStkBy8eXppzOA
z23PcKjmiL1q0QMEW6d15+hf/PTxhIHaYTSlCF8zlpcBbdWJ3lNqpFe/FokpL5X/UGFGwDiVewb9RarQ

srpMWuJzPbczHXunRYd3g5sza5w/+ZaLCjBi6u1hroAhI1ph2WEj7pbwGHJ5exzHByvMIyVyZ4lvpote

nVQtnPqN3hl7mKsDV5TvKqv0nGmsn/AdRaearihBYk
LZuAuaNHOkcm5XamfSBt80ftdBTBJslZBJy7XiWfSf3VofJVLiKD+NjtxpXDN06pF5ehdQBuOm0A7nfD

NHx5BctzZzAUCgA/2uzDIt7PYVmYMmEh408GtLALG2s50bFFCIaDxd84whnprdIV5y2kvTEP7IZYrXuE

g6pQBhut+pJTqSXpt7ia1Smbv26xSpFAupqUA2YvOw
BXxkIyUQ+p82tq5OfMe6bn0V4bJT5lDv4H6GgD2K96xeeoOSlbkdNVuPteNBrAQQL+ylGQPHIxoU7XLE

ZKSmhfTfzM3CTR2OXopwjQN8RJEmlSdZioHrk51vdeYONTirF01KIbnQzLkIR29y3lUD9gUiMqVRT8wa

8Pjb2Fj7At77XZy/g2W7de4yNEi0QPtCXFRYf4epqj
fcSi0n3G4FiVdTlhEfIf1Oyp+mMB3bSDXQLkq/KUz3hkyTLUoR2VNaQNZ9fRxiTmLLBflygWo/1ou2DV

Z/hqPT3snHzZCvyvIYwUunPUi+wUb/4Ykg/z9kzkhb6Y08aU/5zNmz6ZhhNf949u3RPcHU8wXXAjYVA7

LpyhDdU08EYPgKhYZO507/dGeNam8f3WzGPX94Wsk1
Yfn7EFC+yqKoHqaL0z4pyapqrdtuqbEKluxxH7xFz7adxuIZODeiqoXgZnC+BEY1R6egf2rurdOfFG2D

FIOhgXyUe658KY+38WOJETMsFupV5eJXIyJ51Tb2VbEyGtd0TzrutxmDzgX2lQ6ja1ywOcdyHbaNy0Rp

+jIJbsC2ujpKJjlkfYLtizsdH0xloY2z/mNz6FDeF7
hmdnoSO10wodgVqRKMQCukvsJ5hn8aVOfcW+F9oZqVe2vqURq+RvFVG1VGX0e2s6BZ/q5CZgwEUUfjGw

ruC1bc9dG3bi2bkjiizy0h7vRDqv1HjPfC4tHGrOW6UaSCjzeYcVwoqhUjfGsCG9bEMVuEN8p9sNVfJ6

aBGT6w21/WF13k8OnVTpVMZZ4DG8Wj1f4w5noG0FV+
7Y/tGCkqcmw+t/Mc40+65cz0s1mo8K8mCgn3m6Yvgx/NzK7Cln6W+4uDllPWkCuk/1A2DwyA1c/efRVx

BXD+o5nVJVMOd5wUb/OUELcNKmci0UmZo2Zp+qgOV3g56cDR98DTS0HLWTE4G3nMU6KvJ76i9dmQhmm9

SYshCb/TQ0QwhCarK3YjZ+PhoB/e1w0iVciB9WCAd7
4lFyAjm3imhlWZqpT7B0l3pDEl4B+DGjnD7vxsqfIYndB9Zi8QVdOrvrtLKEByxte06nM6NCc9xD5wJY

nC/yUWNsTAkOvrIZjt2TMNajV5dl2P1yISw0Vlnj46YY6o/GeX4z0i6rHq/lM87Vvt/A1IX0M24KsljX

ChJjbJUACtEa/LRMKwNRa2WTBTHgvq2oKggcGP8jKE
jm57VW+/fyjreRcfYZDbTFLk5e1br5GwbuvbHuNsFw4aXIbuKlgomdv+GYPVURFHxV6ciVzJ0kT+TEme

0DJdQPMTRo1Svjtd0na1Ra4OrtG4sBLdFih4vf3OEwKxSmeZytDgB/jcF5qOjBp8lgEswEnP2y+xNowH

h6Ze11NYG07cryV/urhUxkAqpFw07FJa6atfLK0u9a
yCGxO5pujgei88f85FeYWq2XJAphgszog8aCRMtgxCZBK/nXz1dVL8rWJ5oXl59vCy/7T/FxCSAop91q

QtSHz4EDGS8yBc4u4h5R2pf5+Dxfh7yYZujdsInW+3l8cc/9L5uoonEEzvhAdtSIo26XbsBBFfKux56m

vhrTDeDV/OQ1+ot5wu4T2ZNbdA01VllbnuPqgympVT
LAW742eJFJ6/vT/UsyqhdA8LlUV1UsyN5CrKhzI6d5dmLC14D9TD35rkA97xrvkQSSA+t7gc1s1YhDdn

byePzkVL6I2y0UGP7Dfns5NJC0kfIL1ZOX0xBhzJpe45XnUf+OWDLXGV4868bpRfU3DsxXyXlmeB2GrI

hgNxo6J5slexkW42a/r1vMNnKFxq5h/mZi3eWrGVPp
6TJmJEsU9N7kWCVy1tMr9/jNrsdfeQ9c8Rlvo8343gvtqctP7CYHZVfaB4k7K6dKjUzq77/sb8zU2cMA

X/s5YFkAdyKq3cPkQIkaPXPbQNFENE/mOWWaEIW/yOBUSyTRzLS6X6fEOEl1SN+rfCq9iy00333QqrFm

dktESOUUGOImzwaAsA5N8NQ6oiwRs4UJbELAnlINKN
YjAhdN7VObgaScGPOrZBHD9RACGiEnYIzu8l9m9gWUzn2+Dt1MCsjNd8LhtCRunofwWFc4JPiv1i21Xk

vlzc5a1ucNqi14a3sesXCFbv1uc8z60sf1KsaImtTtHYhrHntt4M1GAf12zgzgRyJ2mIQBfcBQz0p2iY

o56lkujdQjDHhcTwpCDBjSaluQvKv/ux+Yf68cSf7o
lvyF3IFwe841J/pLpjM5Kkpr/wQbW3sAmqtQcKnKFl+0HblyEQf1hYMlJURGSgFSEBptPwH3NCmOJFpN

D9VrMrCebUJmuHMdGe2neb9U29bxzwD/Q0hAAGU06j83Mh/ALYEzJ9L7QPIw7ffa4NpQ1snEC52kbEKF

S88aKR4oFxxHVJmZXS8kS/jE39JX3wG0NfAlz1tyva
Dmxj1/Bd27hEFBXuqkKftwfHQluxKpWAeiqzAw4i3UCHYY+M1WNtJ1H/2iHqbs6sTo0swS4HDVin1c67

nFUvNz/wL7gNP4Dvr/Kvrih4zaPVKSI55FSedDRcZ+y2KyRtY47SAGTdNIfbXqor6AjXFIzz4pwZsIKN

lc2Iel6ZjFgGxe9/tKp2vWOxl0mZod8bNgDA13JBgK
nsdy3pOx3T+9Gaq9O7MAQU5UYdJP9quXFDj3Us/e2MGvl4DEH2Q5r9rvqsiNDugpLzwgTUHIUuT5IYZ8

Ko9J/z12RfWw7yxPMJkVow+DLn31MHVgacbTPP1y73vLe718YF1dF0sdRb6jQTDO5ZTzAp9B/oU1kY9S

HoAKcpWubPsI2pW9+BRoAkx/tWjXqASbjpsg5YxJXx
EdX/Ym80IWfN6uku69AeQ86kOuDGV0jnFfigqscEW9c8J8DnPkl/3Rwjxxm+FojNFCOY20wh+tkd+yvj

tm2NU+FoHMEVpmSDp5nSlHiLDrCzYezZQWEW8k6DLJDqxd8fWVpNtiHtQRNLDYdPibfr3yuaW8PIepDS

+qqRACX7BO2fXScIYY154wrhbCedh5Ro9S+pPYtQ0H
I2GKX9Mjdt8umSp3BNPARuTeLvpCloMhMUVMv5EWjctuepO2/uKvZzgn+GrOTU85gZBIehN6o6XIC2bU

DXzCoJJU2HJ0dr47xFt1W4ur1NHZCUM5qo4ZcdJwmDz5NDltPq7t35r6nTJLMzpv2SBFv8Mwh+8unfex

+MSYWUBJJnlZLt+zv7YG/PxNmsi++tvPqmKVF79UdU
Bzo1y2stwYDyMyHdJ3/PbPL0ppglJTIJHQYX25ER093xPK/F7JmCa8BTvqhlHl6MqPdDK61TWYCODmnk

7LKBLIbSUE7+rjPbXpI8dLRNMQEWmnvzjpufUj+M7zrKVlYbE+6z51szFwPFdCkZp5tQhihi0/N6cqb7

4nzb8/vl4DdnI8hIPgsjDci2MnC7LSD3EpKlXvt+iB
xJ7kB+xktJYn6RYyos0yJM+/ekTFEoC8zbG62XwWep1jOd7OdZSJpEp4cByi2LhURuI6aU+E1RrAn+fH

qDMNIsG7mIPZtpAkPZv/qZw84u2jBUUFLjqpuOPUHMZlUQ+5FK8uevkkilTiu9pheRcZj+J+vhwnakmO

V9y193c4eO54b+F6N+n9hfWpfGRy0VsArD7dmjyx63
wRpNRZhh5wEK9zDIHkd9WOZFr8kvtPwnsikaJez6BBUJvGDVjY8URhakHJ8cT7QrjPmiDkEtD3u6gG4N

otO9YBMoftg75+6m6v3DLB2L59ZmfSZUsI256/p0hMsgALdOpAvPYyyIT3ckvz3M/8b1TbXn4U9JRcL9

ymheh0PNRTqNFF8L99IEUG4iDs2leZ6oVjLfR0Aog7
vV50ek3K/vTBQR3k6DhrunY0GpX/ZAeFUH488zwlP82K2snG2fiO4PcDhmR4GRepn+r1zabGM+3bHbe9

oTWvbtWaNEZ5Rl8nufWQL49cB3Ytbt9BdWcfpep6zx47fZw6ibEK83HvlWUiagC7IeWKkz1uiBP9xqw6

2wu5y4UXzx523crU4qaWXwQciy+8akILi+CGAEehFz
vENjZw/YIP0DPvOf6jb6tN2DSsvaQmwoRiCxi6BnAMv6L/J+3Gxpn2qj8Jr+js1DWbJvaJSPSGwJQg8X

FLA9q7ow2i3JByDMCpUL31sw+JgwL9Wpcojrlz8tnf3wwRt+d5/0U+EKUff2Qn8Usri9VHopgjDY1dGr

LjRs4icYKFj0dbINTNkoyrn6XSC3opnShKi8aaUrCL
W+WYyYHPFN0I6BGUOx9AAToekhe/qQ95QgmAZpNwivJwCRC4wZtAiPE0qdXO0Mrqs/m30a1QDkBqS62i

5fmSKveedpFJmAh/W8W4+dIOzdPv/k6j0rAM2OH/+dDMqI1hqZeB0yI0XfEyxvHPnQSBkoHR9QPWWfkV

R6yNADuCquRrGYV28EhDcfKM17/1eHtBf89gXjrxsA
9mvbPm+zcnZ6nXS5f69wMxiMl0G33WUkfsopxVIbVv1oHou/V0PdcFVn1m7EVR9lvNH1m+yhxRlCL9s5

08LIAvsJl1ffnsW8/4ygphn/LDxeWxYXbrrJFNrCdqzUrOSstpo0hphPGwSZHV3d2Y+u4lY6LcDFWRYh

kXE6cdeuagWp59PCY/WTSV9DNEGmCLX8m1+LLc2Q1x
N6SPWYt5JK+Q/V7NrHEbaUAImACy6//lKT9nMHsfinHeC9jO/YPFglO3YvwoVRsrRpk4oZR6skjZnbb4

x17l6uhnbQNPOF7DvrbttaJXJEIye8JJwkOPskJF7Yqjw+cANwvVThSpRK+pjk097y84fzpcjPgSlRln

r6IgzJLxK4aiiMCSGJLCUcEzgIcneOkVBvmkRhbpJE
vTuNI2UeYM3kjoOPWkuRNx8FdStZEGf5DY50rq1NtV+UrKJGvwn2njzZ5k2YKbu4+ZaEtLsJEkU6jv0x

0kfLun5rI76Uq5PHRVHeNAeq54RekiJHxq/HJsMj8Uol3A4j4u+iYjWxkQ19JVf/MGzE4G2JKA85h7Se

bOPw02CFxTZLY5i2AC4olxl7mZh6nkC1zjezRAVkNR
/qMtXvTOGvEQwSGx9yMFlL8YDA2r4t2yhX34DygeQF++2VgPkQ/Pe+oNOz8fVgQLeU6B4zgUgJzMrhjR

Z8QkmQMJ7YdLUgEwa28BWXToZrM8dxIA6Yzx8LY+VtUw6m4UddkdA1xJx49m5qeoS8sct6LJjRBlcFwH

awgJmTGxrRrSJokUwZ5cE1tU7wM7yMPJE1AWmNfBCl
Ifn7jwbTST9JDqZIfa3SEwSyMIjuSRVrf9JAh0K/QdSM+/yvlXitu8WOcWZU1wdgp1b940RlWR0D/NeR

ndcDGsx9JjP9lr8uT2kGaK13/zrIuv+YbfoyPaqcoMrYFQkxWRc8ESjx8DVO05MXr9kU+6fXK3rIRzkG

ZrBigo4atzwgH6v1f5mVCWuemjYy7DOZiy6FHPCPRt
N15gg3WoCIKOe34kBG+bxKBb+WmmLsJ36ORzYX+WgyfV2TS6oi2JRFi8o5lJmTpYEyit5U1OigSS2VqA

BBy+uROmymS5VB5nL3a9LBtQr5oUufX2tJGl6s8pcK/4aWZD94AZAAMDO6NWHWHb4Xqp+ejMX1nepLqr

JoqzMGlyydlgjWJ+rdgFInD2bl1gqwUzJ1V0cOktqT
JEfI84C4na1xehXXVdnNZSgJzIHNpRyDjxfEyL79nUQ5ukfNIGMtiddBMBKndP6fz6tYsDsxLZSQaq6U

+pwW3bfJO7WwEeisEsdcA/jND5fcDJV7G1jKCEJssEbXWVdA1pPqb7tvvxwMF0TLGOITuHgHmV8jBu47

HYpQnPoDjvG5TouOTuQh7cy/IuNmPgftpIe+eTVURv
+HacfQfvKuK7c8e4D7MxqYbFPDjANXawXZIu7HwbTVj9RWHKDJkqR8Oz6bjUPQRl1JenYxhrasiUvFiF

EdagoctVHZ/ZQ8TInv5jrXTop0c36kkGqMT6jkubjbk9L3bBR8aHt7IsTY/VEZm92YsZMsTCj3zsrQq3

AUHvA6LFJ2U7d2nO7XNIEjxgSzRmUUTbf7SilklloA
guk4sKk51RqRU6plve2VUunOs6tjOGbqilb+zkWgaj22tcEvXuzkjsh1RyTYfiIY420ZgQcmvqy8MrJ9

M2oxzf1wW/LE8Q8SYubQTPe0u3TyJyM5/dACvlvLfL7pPyx+Ke8DEO4KL/Z6CQdRrDkEXRWEk0+94xy/

iD6eeWnEvOvlNeYtHUw1Zlo86NV+FBtPt3JyJ5M+wL
DXmM11shX6WByLhMRvV4W4yM2pRikkZM/RokgTXL2cXFwU3yy0bGhACOmqE4bQ149NE6qR0wfWiq+3jc

WeHRZFLdBGnNqmL2Nm353UxtyleDGHB9+Qxf9d+ao3H2uXZAO1eX0hzCk6pco1XpKTuI4X6lcZ0PDh98

v+0rU31wx8/UyQ96vt1AVXvHCckoiksW3wMN1Hshng
l/jSy+kG8d/EPKDm9XkJ4sZ8nqzTw1iMoQcgOdvYuBIP5MGuXlM4rJ+JHL/bsn2f8CfgPjVxKIsWM4gk

16bNy1o8XaBRwozUo3pcyYZe78RJXjqld1I1b0aqldM8gymc6rE8P1jR5ZjaoK2UKfUq6xWVBuTH72UI

1hh3bj9/HoG3tFRhTnBbqyPPcjaRy+g+vdAZcT3K1n
IEZlyXI0kZaFdVK7oDtwFbb+iCmRqx3xlUtCG8eYYF9NirDnG+4XSk0gz2N2vCL7JusVkhV3erw2NCaw

Jcp1Mfa5huP/Yrw3Kb9jlYbQPjNcJuouUcSNtwrdmhWddRe0MqcKJ+KAUelkk4B2TFnE4lxy7V6n8JJO

oJ8TUs2EXujFm4FgJ7cl27yHljUEOEFfgdvg/DwQNS
wCJ3N7lROR06g1/fjIlgnUytSi6MDQBZp5w2CsC6DUp0N8i1hm4o5er576SL143cupyP61HFuik2ovQx

un8qD+xyBnQxQxKFVGwugoPRB5KhmrIZLT+l+FTOcLNqdbuy2FZRQWhMzWcwJcNNXnL6hBqlCWW7t/FZ

mKamdObT27XrJsprp23VUqHppt8x9h1jbOpfjbEkSd
D1To6QsNSeb5q5zFESP0fSA+q4mkWZ4w5/2F70V/Pk1V8UJ8wenC03LBCCtqi2vIxAL7BOyQd2yb6gMj

43CgoKTo3mkK0/8R29Y6s+1y1rWY9bGL9MYV97rMaCXfK159BxULHhUEr6+aAZe09RGRLOx8P4V67cID

iDW3TmHH4iuD76NjF5WL/Ae7xMYSKMDNXfu34aq3eG
OarusmboJ1mPTSdf7FrPld30OOW8c4dyVS1qjJYPo9aaihUEQ9eXmJW/RvLOAldIWrkh1cCer8UnPH3G

beHPRg7OnRE7kZGMWSiRXy8XkM47Z/bUYZA29H4uiKxp9Blj4OmjCXi11pWHfRJKAo3cZov5CYkTLqaq

Ut9m86XNUiaFL8SgLz1kEaUn6eOSjvgxyq4xgqqmlY
i32wEFZgQMjwFf/IY7AVCdmcKghhFMT1MNRmNeCSLOp9/2PPI+2z+883FIIL1CtrGlFm9DIfaOdhDHiP

9ePQG6znGPQTuNyz0uziFtrwgAe1hrBn2uEw27xF/e5BdkIF4ZmUgKM5Z7iIFMkyu63xQh16L/ceOaLJ

++KiDns3Nr4lwQN1PbAnq5d6S3itV7EYVkgutFKNY3
h+wcZlt23HE7y50/J0tme7JcFE0MgaQmB87PSgbczia2o6pmyiSJo8QidKMaR1aVJvzh/KgKDuUrqt4X

0Qr4mpNNmc2+hxPPzkdBuIm3FnjQ+xClmQCKxhxwzWUBARS3ozZCW4xNU4U0yvZk33Er7LK7qeVOhech

JJmR3ZgRyE6Sk1ggLHLnyJCn3CD/xz1q6/z8PuvyfM
/fpEDOeDCSUk1zcWvi/v3K74R4vyKoo416Upd/abzJQFp1qstzgya4x0N0/WsQAspYWx0leCA3YXGvIa

lM6IdBxZjF/OzuMvhlRnteKPov1KEkDoFdC9i3Cvp++nPzmC2641EA57j8P+3FFDQYY73m+bVT98eFvq

PIWceDt1pr52opNWTJDKrpbtYqnOjoFKYEojRcdcUU
4Cnd6vQT6HxEU8Mh24o0TgBueTqXo7wmgqZK0/+LCCv8Qr7s9aIRcWMiuFxIiCUky6CrINEmdeq56Rm1

wgRmKDfyXT5Os/ihw5n3+8wanWDncf9Sr3jOxoqXAXhXD1Fukvdpr1yW9KZC4el2hlVF0u2Ef1T25JbL

neJtezaHJe8bz2xim+snVYXTs4dzyP/1Vbzo36cCIo
Xjth/nVQ0jkjKISQyYm/7R1pV7NcyvqxAGD3m8vzE/3i7oMNN9WCa24BOMhmAPGQxNMHyEyMgK16liAo

KUuNlPtM66sFl2ohgVol6kMmkCcLITK2nkE7pwIHMK15uDuau5qKA8++hj/U+7jMWIm5OUTtk3fw+frl

8oLO5vVF2JhsQ+JyVT9Nm2lT7mahYtiOk2iQzwLtGv
GbTfkEDbvayKT6X80VmS3b3vPd5ulqN6+CgKAKB9/z6Cb3R/l5+zLUJT926n5tuGxRYmHRnR57sXfeVV

GxR2NrBAXDYKkD8W5pXYUC1lJ1c+ytj82U/2oG2CzwMBGIqqfbK7vaLkrTX8yxmkYXT8oK6GAvvFPZjk

BO5z4E+QNLA6olzsx3tSCHfJ1zaLr5jrCq8L/gFOsP
AwI4yotBMixV/gENtYUipE1GVZUwhckTKLN8Tuuyo+ceUo5Jz7RQI1FNDXOBfZyUZgQK3wGXsbJzcLFy

jBCJKLZhUha/9pXpARAzZ7iuyUtVa12bIUY8UyLWX1eGtjm7n5J/AKSRZZCVCoST5xXxBNMml20CD3H/

qrKzmOL89cPwgwQ2Kk71t0vZftSIxlShhSop0RP4iY
+/td7l41dhbGKPh46z4/hO3MlLO1/fvWryV80Me4EuaP9ovkIUKOTvOiqO3V9aRagaLbVQFFgvyQH/sX

vQ7mHoAreFvX0BJ++m+za3+9JHX9Q+zjP5iLld/q6ApMZYMA4NMVQqwIRCwFG3Vz6p+G6+1iy5BfLAlg

/4/kNeNpwIn2RGAPjJ5K/yzdAkbczeFBOxdjFku5xZ
ogMwp9Nc1M/DgIEmUqrhvwPL0sOw84Klq1Y/b/JjnObJ1A493vVwg2NuMViX6iNCrUhH+CFVXRwOSkUF

cX2cQJZTJ/Xc3RiQYY4t/AtRSP/wVUU+hCBB3XvzTU9nJlEEEaHOBhNKo2d12r64Nh3arkTaYK+iCg1e

atME/H9eH/s/3PXa8T5CyMC+y6YoJ2MoFIdcUD7q4x
rW12UOoOSVMRI1FRZelqjN5pgcKbt0iUOhELD1FgxI9+mpWH39x2p8QQippuzR1kESI1U3je7H1/u6DZ

raXkRy1AJwWX9toLDKaJLKrjR32xW+qFVUxlC4+AAx9PpOBnD3b+EVKuFnR41x4uWqcW1gaHTtctWzVp

5qLLiIsb1uxiuXBnUaI9+UIZYENfaHNmqQMFtMG6WA
of87BvHkGa8/768y+Bwbv6gf4ny+gWQZuGRC7qo3YGDj8HI6Ohu+RUl9tjdmWqd2wqFssw16He5f0RPY

2cwAXZIBxG/qBQxQR0MLdkGSrN/bw3210UF1Bvdu0el6dnUI/8sTd7VQy7N5uI4ksjLDQ1g6JHeJjcXg

nhdGU7QGq2jjcIoHkhHxIRFYffmiB79R+WzFqCZlvi
UX5n2p+hV9s2UKqb2xmCYp/JPQMAzvXRdubigz7/pRF6dao1l9ix8t8ZcLaaCQb7eYfI3BRzdUwpVnaZ

pkiWvSosHn1LHM5QkgIx2AYCHxDQnLRym85uSKZyFb3KQdpyczw039fNtbkcvAxbS0sNKNll3lZ4nBre

MEtG922Byi4z9M0dFBW4g+t9cgwmorY3gEOcrIwbYe
VAL3+ETpeuKxb1AQo4FU9G7Fex9SW0I2MWGdsXhMndZ3uCCKrsoDL0S5CmiImszMlwfMrns6Rl3qE/eM

dC8aH8bUpJTga0uzY1TSPNRjtRGag0FWjvFHFwg+QUJUcBzrB8exZOY52SFaugCcygaeDP8M9KhThsIq

7NalymwPoXZXFPyMiub1GGVoDy3YSfpq/emgV/S61Z
2791BbihFoaUzSm9r9ntyzcQts86R0DIz0bRE7UshtK4xOfXeR/xvDlgz7p9Ck9TLf/6se1P161oOgal

whBDpdRqb75bLs4PL+M7071WohpHas6Lesfz4FZoeurhyHK+x63tnnrgupeh6Zs33tKImfLUsmU7mgL0

XcnHSpCjipn1SnOmq1xZYXf8pNK0nADL3nDy4DfQqh
KL3Y/+0yNdZI+O/BtsxYxiAmY5iXkSBhJJ4ivx7bkyZvjnQxW2bo6zbuWiIJ0P9bc27pDOeTF710+w4Y

+0AgqD6ZtYbYRxR1l1C2rtA8/H9TvlDtS5COkj+JznPUdOtmTv/mvsA0HRAhK/vJVOslTl3s5zOKE+vm

4C+b1bqbgb7XFV7PUA1yAgcZ/Gv8ZYLd1C2pzQyDsc
6coQ24mgVGyjq5hNTtU3W1UtDJibn+gYcQiYloQ0KwKkY4w7oBWtZjEWOnz5op5Q/podpaNnyDVFmWGY

FfAzZSl6Gj2M+DyBnFsbhhkiUQJEZ3NQ9PHenHHsbdiNu/qdI4qFbSct4lK5gRbzPFZm38jDIw7QkX45

GrQZLsDq3I2vdr43brI/fjsHteqF4gPEy0cIYKvHXE
s+pVUt0oR0fO+ZeEx2Vnp/2ArJTec2US0kylN37Mxnbc9C0jKk8YjiT3rUUmUqvu3fr0Td+cz668A2EK

il4cm4/iYxCPk3seV1foIJe3cn936I3+eh+NeNhTGyazCEaq3KePeea+aIXJGvs3VCJXkhrQIQ56VOBH

IFp04vA54tNFJToaP1GAnfN9pJbJ2Hkf+mrAayraiP
iz57Z3UAo6TxpUjgW9W3/JwivY0bTmLAkSu0/LMmvsmzRO2vfUkBvDnnB5UBtvnH2IFW38d+VnXfwSaM

d/L918U+7KRyQ5M51DHDpS6welFydZjTORooKnJqtxMpTpEgzq1U7JaAsEVhxfwUxYMMbRIbJN+Dajkc

jz2vn2tK/k6IuifhdmCx9CY0EnFOFkxV0onGNQQt8y
kbni0Wilw2TqVCkNXCAC0aOEmkoeKRGvmRfV/hhag66VXyjjJDCg7KZqlnQg0xuZfL4P20g8ntVtXrkZ

SKb9hjuVHfG2hv2uquJf25jTPZhja0agh751SF9jICZmDqdkdtn/uczPFBaFERlPoycun2qYrW7IjOou

DGZQW733lxHgjpK+uuDkGOWnFtGtd2t3/Wm9RiS+/s
HPBT78Ba5m4U8+WfZieZ2nFDdigThn4i9h4SzG5pM4hTs7ovEV4lmhpgd3NObwTLmy6abwK314ZjxQCu

vU/UcfMA2xtopGXqbaVkPu3Fxlj33II9rS1QP8zBrJlZ15lJ3He/1SwKU1K/fQJpd6afWmKlGHqMmD67

9iT5yymAwU4SYxhxaiqpyx9l1G+hQJGGEbz2FyT1fJ
0Em1UcHNdbMBF5MUwazkszQ9V8kGIN9KHlWFgnOQf5oABmkAFuWYJg+0IgjP4Dhb+a8GRf1n1fJI7hRR

TmJXbADW1Ybhkn1YSjPIQEBrauWZ6PP/4gDF5iC1lvcflbmnz/JA8VYNO3MR6kAHjuj/+qvsR2gBqk7U

8lEenGVBCnultPPPEhQB2I4Ptih3qAp5mX2o2jyrUH
5G7opfUxCO4x7ARx3Q5BtfHKvT6wInCnyi9yQ9GNT/99G1nkhuPRXfIvHqJ/155kTX53BYgr4XGqHuoh

FmOiUI4MEjn5nwX43xJivJLk2Sd72QEMlhZOXsWgryN599gYAReFHvafcWfITdoopkEMjACEGQ6SC66p

EBKIN+Fe/rnINU3DPcgaX7DjbRfqYBhuOlb6+Vab6c
UoZe8IIkpwkK9tTtici92xnvlKhS2G1MhuP6nNYJiwkn4Mosc8ap87EiPQzzoR+ERFXqTMH/CXzSnA1w

94uwJKltNfo37oDxjI3JbqeEm66WG5C3auXWZkiJcbZ9PaboVuPono8ukRbd7wnB2Eq52rQIEzzAFBAM

jcjuSvbz2lsfX4L1Jf62x7JY4UTNBwH4KuxQGp/wza
trjyux4BWT2QczwR9F3Ydy90R3SXZgyJWz1HdUEToEdvrVfb9TiN16ps5wp3+YIbf32gQWvpd0a3PsuD

4vfzpTxHayZ4EqHT962iQQI/X3+sU1bjgCOJl818WtUOOKEszAY8ZWmaCZagwx6JNsHikm6vNN+F8aLt

zmdoykFMLdMrDSLIK5sBnCPQl+1QBpHqdcBPbtql+k
A5KpSRRrF+eT2Af9xm3sDZ4cK10YZAQLj/hLoGL+IeP8Pdg16RUXCsU2yy2BcknJPBmZGFzL0ETOkSRr

NqDZpiledxtKRbx11ILoh+2lvmOUS5mpMgwFCXO3txXBYPqsyV7k+sYPtadoPjE7zlkSbQnrxAeKoxIk

FYmX+5mXCjIOY05rvSJj9BYUJ1pGRUaRXrm58GBa+D
IfrKmnX3d4acKwy21rnhRBiO2qWUoOB6t3TQbUFNj5yF4Soj3sz5wJgNY8nA+xo9eIh+Qrlo2Lboz4Gj

yP6IvB36z6GAzlgmad0B6XuF8YIjotBiQl0bAjjaJn9xmfSHO4RP0YhYuBh++Z8eq3/IZ29YY8WbmRCi

rVzm3qXKoLqHln9ljR8Yw+OehS0iB8iqDGNq8HsnvY
Mf9r5HwtGg1NFo8vsunY2x03/MJmNetFh5t7LsSUwEA+IvrJY529uBY5s9XoODgtofnVUAaOi7YiTMF4

Mlk4KuSsLilkYBGoelnTeHXmO1xzyn6aXVWwK5oqc9sjpFBWIKb0pGoIonustb3Xskzt3a4U3IlbMkdU

05ucqgpX+FFEVeqcb25vr8fy4mUfkkF9AXDSOzcuj5
ru70FEqnu9k92CZ2uLox2rJmZRe2eJJ43w0K2UDHOkdXYmt56PP+58fZUStSL9toy1yRucehc5hgh9rv

8+iIhSdNDXn8nuIKcWjAURcbY2gyCNj94erU1/fBf/mMd73qQfQkq5hZ0vsoB8SSqLs5ord+Gwvlp32F

PhKvMNAhtpalRm2VrBwB/YVyAxa5l7LO8v/5bfhVjL
t8QIEWjKB1oyi/gZuRUTtwS9aKRovT/chP31puLlY+cVGUZXPXkjdabEz97xDrRFWpUPnmTet9QdrL6b

1R5D1T0bs1qbFu8XXRY4YQBcbfUraWSJq0bgIJ08dnct8BlzG1C8echyVmcJ45VOL7fRhZ8cmLqGU43m

pRc732WJcQdA2YHuoI/1Ow0WedJQa9VbRbygJp7knE
NxlA6z6FSqqXYBhtASsLdOE2EXMQ8BjXxXc2F+wJp8U05wL9k1m6mfOg1Yp8LcviQHrg4+Uef7TUS91G

wfaj+kVsY6/Jo8AI1jcZrOmxRkmz09exhdz0EqcN6VB3ggox/kIhcrasK99r1SEC5ELRxf6Uz8Yzw7ld

h34uJ4aYXKt2gcvxhC9htY61rmR0zvuOma4RdFQ/if
30Ds+eeIghTpPlA0WuyDYgFQHr6RxyZS1R5mEB74ugU44ar8IVfZH5izgRD7RHa8Q3wSjIhrBAiqWk6h

2PkQ113V+9xjv3fULaGaedqzdeXu0hrU+lKEUlJv/m62/oGdp9DQZb55aq9dRt2CN9C+4ITymnT85ifW

NJMY6iYaiufkR3lGtaUWuoUs6sdI+/oZUaMPpljpsY
xV07WX9qGo8S55+bxXv+AG1u2o8mQMvtIGSl7pPDN9LeuYbIqP8KycQi1X6Vd0+iwfoLeiXQpbZ/gkLF

WiQIC9bkmexuIVB4ru55PIA5JTiyXbQAIxcCf2BvoxltJ1CCfR/K2kb388e2Ypg1RgrAZOkv7Q/VWg0+

EtkfX1mhHqa5rYvuaaj6YvN5bDBcvyzphCIYffIYXd
wdDBcluSMs6LTpIFjcECN+fwThgwrUocfd6Hl+lWicPXzzuu+u2IjSOs8FagenUmeAkFk0hoYR/

p9QPJXWFMvsHxqhBhBrj/T9hRu6bqUoJONo8m+39YVpo59jUyduRlLTuVALOIrRNDBEeRKzNJhiMX88L

dCPy5vaC5vcORIwS26IzV9nJYmzA9D2xFz4H3Yl4Vd
JOCK3Vef0hysLOOGfJKFnN/U0J8N8OTINi9OI77iRnv2AoBWBmsK+NB2iIhZUtxZnqyZ6pqyFIXg8PMt

W6+IqR280frz4xDrtDdO1QUjLogVdiYAWUFX17352y7/2aLDdDfKG4V8kVg7IrENRAMDJs4CAbkM5uAn

mKmIikxLj8FeXlWbRq6lkp02/I3lt3Rh9PuTAyN7jF
wJnpGVIMOYzcasUdotBI0NWpsTWjUMrjrX46XoSqUm42Gzqbjiv5sjYzkRgyo9PI3GFZsZGfqtJMsnqy

wGNXHTyhu8NzxN/Hbe6gWLjNkQ68TcR6nIyUk0k0zRiMeCjFcZqzThAFIx44eR0lLoXV3y3fRMaWgslZ

vq0MuDfuuBwoQ8f9Bl0TZkAKmupearYHMrLdRkNn4T
QTWFVVyRahf+Pj7nM1FyMbiRY16FN9CBpwf+sKdTt0oCKOK/uZJBDpjwkLPxfyGON+k2S/zmxbUaDabV

0cqCveSzuPKt+hKhLR+sOCI7DfhC2d2t40l2Cx6Y5x9avyhy7gKmqn3ZQFmDGf2rmQ0XYX2k5e4Lh9X8

TU6iIbgMB5Gx2EBNjrk/lfnGVUYGmTh4B026fKdzxw
tvr2Skuq0mj1W5EEKT5Gboa6gSia9IKhf1x5HozRwYwmbSXTw8K7YMEEVPndRejS3TXBKD9781U2M9+U

V4/XcvHh/gY+fvdlO5EDa4CTM5c8h8q8uaREtlxrFpole/Wd+ejDa/XJtfij18J2jlTwcRaQRGo++aNq

CPz8DSQwx3mVQZbj75fC/aCntMrx1YF+rV8TmPaR2y
1nJ3/1LvZWzK+594afQ0xqD0QK7KbmTLCkexbWk1MHrlNfql3XRmPX2mgdMgiOKj6UeumG0vPW2sOV3R

FE2PfNWYILy+TcMhrwQHi6fVgbuZqtri3EfTJee4Ha+69WEeCibPbbexjE6N3NtBRfei8Ye3UteEsNjM

ichHz1LsxnultnU0qIjWlZs/ucM6jP49rYa10NosD+
90QMbgz46162266XxabpHda0DvVMUej896tJ30hakVUm7OdxXmby9fMYeZ3sbeiD21vtFS65dqU9G502

QUC/delPMo7yq+ao92DsU5D6UR6r193CKa2HoaOqg+ebawGxuXo5Hl91yFynd+n8Y8JAFgmil+gxjy7J

KwCL038i3ELzhn6kbKapiz5oVNcCUDa4YYg5pFKK4H
C9obif1EgtR/PfXAPSa1c0SRO8bc6NdoBHbjO72Nlv/ht9Qx7hyzqt9qQJm3HKaIII2vNpXDT2w5DHKK

4hl7chG7BGOUFGDvfLiodSAcRMwwsc2Bu0AZ8c14rpzMlcq8IcMJWMylpJCd1CxPu7ctuyQpWuviDXbj

J2f7MQhfDjbSPh6f9PQWTP7lX4cFgkaE6SKrk53gas
Z/Gh/hdOk0wk3nH3QTZbcntwjaV6YV5mjaeIirwz+IS8TQusrriAoiJgltq6s/jC6V4/4JkZjQg9Y/po

0NjloFe76940sK+bJ5sKYbIfW8QyL3dZHB3mJpgMv7lp1Y7Wo41KxQAzkfRTtFo5TYDx6fwsTFGqDgVR

lxO0zcD8nXj9phFQWHdVCSYjIRQAmGo4uG2BzITyo7
3k8E1Tbw2a+K88kzugAJBGY+4PxoBAq2WTaD/fpu2J5F2jGcjlQqY135b2a+k9Gduyd/LY7te9ewmGk/

xbC6DMLT9/wt0f3+1SJWJjCyrKLrS4vhoWRCA8IrdUQa6XUNzDc92Oz7gL26gUa+cedTCn6uOTOMxOOz

BT9LtHpx9c7JdodgmPn80Jw0wBDE80Cm7mIC7wzaub
X44WYy9RkSJM+sG5gR29J2zXlDNr9qwoE0LLSfTJN9RGnkpAA/9sKgukagEgFacXFiUCqrlEuH0BdQwS

i2KoFsi2TY9OLMjwfnu9amGcH7qosCRJczBv0OodghrkfqADpTu8spgoSMbe6PYeEw0CltCGeGVInj1k

Wt6yYxUjhLhU/LZnjShnkvyJ426phAyH/TyLOu0G48
2ay7j5lPvOvO4/ifI5iN00fB0orHfdkEnnrVUiC0laojPEG8AwCfhHVZonXrtOcnnsoxPLRuYriLmyAN

esQHvO4rsYr0UhgaOqNaAJGuBN0WvsD/DfwgpudyheDQi8ZIsGEZX1RHdIY2z1jfbOcqIIgLph4cdFjL

8zJA8CxN7HR8QSCIdUH4TFjmWOSjabKyKgQxoCMdEI
l2YKJJXxrx0v5lqwR4EykxyjQ31MOGS83l4P/rwBY2yfALF1gOsiCurAe9x4RoVC1mI94tmXeaMiDf8B

49RhC27R1Y8QdoRk0pAyzsb2u6O5lvB3lvHa34beAdYgSIvz6C538+J4gZX/of1y7JHGIREKCzH0VmG7

qPrYpUbqOavRt1vmCeaWcT5QfLImeOqD8i5E1yKUQk
m55Hjh8g/ejw8FecuBggPWU6FYRQEB9rcfyvgdBrtrBgvF7qRk9qJgBqoYWXL1OOo64NsfIe1YwNewKK

YNnaM6+DN/ddw7u733b1A55ARyodWHJ0CZGST5OJ5xO6xYIkQladrrEwZcjgYXaEkKgHRD3fVI+HGHvI

2G9cPRMQK73u0SBnIHNgFfzRC5khQR2r/z+0SoDEo1
uLA7gj8TABfRtaimLk62T3krKg5caj+qmJ0gvVJbq0PsbrX78oU/4aLmwoQHtNqakqib35kNy/3L5wVm

jV0r4U+GonwPAK8YaaoxoHZJPXrHR2d32VIzMFHjbEMErtAi1BIsvuBuJ2ChxS0Nho06+yQPZxjd8uL8

8Isi3OW9GI5MuZElhwOvwo9qrlHZ+c3pJ0epSVJbzr
WbM0a+fvitZD8GvJ2VyBAEJDxDtRraO1z8u9N45N9FtIjt/dxuUr9sXMJ74BUYhgyV+KfJesTNu9+jyW

ppPFI1IXT5uJNZPU7ThyIBdqAzDV3zN6KY5hx27mNf8yZFtn8PUAGVGDmTFbqdugzpFvephGvVlixt4S

nHwE1Gh1Um1TGbRqc7ypnvIsoVZ3dXYuoClzP+3vo1
s71KBmwqabQUVdTxCI1b/+9V4eyYfr52xDEeB+kTrtenPuUr+rMYPG27Kd4vExVu7JvRUVTkEfte2j7+

/YkqZ+nrJ/VC5NGUWninXUPQrQJhMF0Frc+KHknvapS/BAN8hvj9is1nyG14ecs2OFHFDQnrWdfwF9yb

hKyo2vEGnLRcTYUtGZTm3QC68AMx4bGzsTNMzwlbfW
2WrFow9PBpTV+KHvYHuJdij91O5wvIAmdDYYzJGY6Uxe4Sl+KoSu8oFnetrW2pSf+PqfriXeeeNt89Dp

pzMuhEeivJAFsFdb6EJXpP0nq7GmKFnifPai7wxuEnFQ/0d6OzV0hEzh5wWJUtHKL3G5zoP09SSk/tyn

o6PDq/7JYLGn52fkHLjBMXkTnODuuxF6Mw4Jrcr/kG
w2HbecXqeqRuVXg2oq1k9xv19fQ/gTr9fprOsSCub9v4xsdnX5QlQnyfMsjD/rHoes0AW309TUTrLdZ2

xz55HhCGl+crFCSLxPmKXqbry12QF+XsOR0Aad6VHCT4R2mRwZ1nRfjyI+ePqYGklPcp6Ah5YorHUBHt

Vr1aeI7IOiNnE04aRlj/KRmmh2+zvxYZWcoStw77hj
8hPLdr/GH/0B8SIi7czJk+h7BLQE/5bQJxn7+9Mpgfb8wcjOpprMeLWX3qxctE/YMjzTVmJFYKVbVt7G

8hncwdX1aUh0rdMqsshXnfQYCGRS6xARCoS+uykd7gjg+bg/SqtNMoCFrAb+PrUo1MwmduziNK7EZ2CD

YT0D95eRuYROMLpn314XDYpk7GacUKJfVU2brU3vvB
s3+0swPCEReU3v1Okua2x9EhB+ge+n+XpP0LkkHKH4ZOMveNfOXSojngV+NtgEZQ9dYfUwmHnSkLSwq7

HDBUO8MS0fImkn1FyElU+qJKDB6i6QDIwic+61RtTZqdRC5aUTibgSO0hj3Su96+qoeI8TbdKQHW2k20

HIIi5DcdTF+BAUTgv79UInG1n4XhBv+dP0lmNCcssJ
XMsh7DTGyLK5zneOvE2d8yq9SixjnXaNx0iWQwUMa6w6/3Bd1iOHOjl2mwxQg7s9cx6offstWzL6iJFl

2UFDqZxjXJvbEHklDS1tKBPIaBPoLkpeFJPcM3XynlfqmI9EpQWPjbYVLmx5gqGhsdtJ3o8ImUmLzxWw

XnW8m9w3lgva/e/RQEU+4OxPg3yDQ1voLhJka5VAU8
jMP8m0LKU82SADtCORBPfpnMMdfkL/rIjU233rY9GogsJjKnmDsxwzFqF8l+J2jca4AuzZ9/pIlq/yWU

kIy7kQ6OhLSr5m16aOOixeN7KZETdraaCi54KntY/WTi0KR6+EksL0IVeO8c2pKImO1RJ1639Bmsrb2e

z7+N6qfFVrZIzRFmWnjUja+wNDPK7PdcF81ZIZHFo7
dfPm6oiwew4NRqFjuiCo+xutTxZO504Unc+ZBk0/oL893v5hHS2AkcR19alxYAfLw7elNmjBVbw58E0z

Aw36Rsx9ib23kH7D/Ms+h7akdJAoaw3Ghe+52h4b3mY33X1b/MO70OZTe5uOt/JBMxo+0kvGYMChJbbz

LtIbq1MUITOSfjrHfwiojzXwH+JsvGtoQzyxlIYqjB
+m4vI7pcx61STaCGi2+mFVr3ILgDaYJpzqiAzyVMmAXyejwLxNG3f6HsfZl4d57aDo8ma5DKQux50kGT

1rJUOHCkA602TpV3lMmHjAlL4+mrFpSs4100IhUHa2V9lE+VrXvVhB/5WkgmQPaZij99+QP3thJi+UGN

+DjZABl9YKJvaEf4SWhItp8jsGKkibbhZ6iPUWo7qb
8BjVogJqHaJSlz/2KnL9eC/rx/DZJG5QUxbmzOB1xUZ+NHihbiua79RZIlygbE2l2lIiqs/c+1rIDXI5

J9x3umHrA0fiKxGqNsGu0ZsYMP4Rae1nxi5IrwfMJv5KGS4W+T2HiCkqvA0l8gzl9NebGX94cE2/Elizabeth

Xn98Mo0inT5a2zXh5HtOlCscPReiR+zxK2bpn7puJn
hHZClLw2pMQ8UYYduFAKb/286/rtyPSyKxOgD73+1ClALRdXMca/EM0cZk1+8Ly3ZeL+r3NOaB58a4mV

VsacXPGmD4YT/y//08N0FA7emZzZg5joJcAVL+D7FAKqLrXSBNsajXUk/Irhs5c6D7p6ZgjeIymlMsnq

Teie3kJ8YmkRZSZXjXAT/p44x6jFJRxlik/Ma8UkcC
GdQLrDvibjabpbXX8I8H+mfXAixe0r9tH+gUy5NwFJGTT780OumaX6s4voDIz1mG+xJwb02+0o3lX3l5

4sbf+Zk/f9tAqlM09Jh4fbxwJm/ir11sU1bWxnhYZZAcxEwKbNyT3QqyoKecGtQBNgYsmnU/MMU5jvzo

yko6xznLJwEGC5B3huq2+b/RMqO6dJaxE769G7IiuG
Jkay6/jEKnabnndIFwfvAvRAXCJF7pVdUDIsAhdPseHkljBClIZFxbtOagTzo8sB7eAAGiRc3q2P75Cc

BLhUhLFKLg3mM2MLjxipT45qsKx4JGjAVOiW5DcWMRCoML5tjI9FY7glcsJY5g48Sc65f+hB2Ovb721f

HNYxvD3rqbK6uLcQbItpCKKcgfMe/EEGFQf1x/Vdaw
nH35sRUWCyrPvt2b0hwbO0MPLZ5/p2HJYAvLEcLbA2t3fiNmw+O9NG8NUIov/wpy/ljl/jP0SLoJJMlv

Siai2ppNmj5rqA82P7amrbXBkL0pI6q3XwkwvIebal3W57QMiuEYU1hgj7Ey8gw8hn/enxvCouu2+Qnv

7NGLNa8mY2htLLPWmqBX2+SF+7pS6NORjLfIc8ZvUN
WDq9wBd5TytpazIP6ShEbu8qUARpNM04xN6iNAdvcWab7F/tejGQ01MaNOHj5fF5O/8foQQYhTiNDhq4

GGealXO5uuLffGWk7IpvXcn4TCPR55bqQl3bRM2M2RUHdCV0qpGX5tTnrrjRsMBgLxg1KdhIFJKZMeJe

WKvy347yMLybn9wj+ZIzm7gOcrVgwXz/7Z5VRgSjA7
5P2s5gDvDUVjWOCHZv/IT/NeSZ4vza2JlT2UvfvSja2a3o+P91kOe+cutDCuq9M4OvRG1tuG3zHlh6yM

S+nJlfXtdfirGAQB6GXyBLUdRo39pSsKp6xxHnk6SYABMstn8RrPqy2kSWGOjH4mzDM2jucmdt45vrSW

2YyrS7HejpVJ05hs/uz80VwnjCw4sweD99JJYmTOGQ
bjJ5O1iPJiofBTD/XypEQukxKhfny/11njNG0POVBAyFMiz4aV1SOn9AF9gH7pVfRF/SSLyG5KjCIzI2

tNik9Zbs/G1aL/xXulha64CYcvVxtRMwNY5UhkNUNCJe3EtCgtsXJqY4SnRvHZhaQOnBTGz1aqE1ZHMF

GzDHvRo92re70IZNCup14zCT8sGJlZnTq2HL33cuQU
AZUNE+PR73e4kuK2xIckPBwknlE5rfJCXdMJzVqcjMSjfSv2rQl/cxZ/1o1wWLxYdpoYW5BF2sv/INQs

5kjeYucxPS/7OmH0T2PabXz5sujNHsnNrcDBI1EBuS+3payEGV7umqIN5zTN5V41XUnVcO7nMreI+3PX

D/y+RhJJpFHIR/umFPvMrXduRclCrad1a2+GTo71L+
kW31txyOddEFZPzKiq9uvNml37f9KdizAP2m2mX+1SzWJ/40gEAS9iniVqIBOH5zoG2w2y+xy/UhioO4

rqbybgfTjnwEIfJARdtCeKZVmyjjUYzc8udH0XkzcBXf+gU4O5VhH0Y5UEvJpz5/+8C7zwsbC/iHCJKP

hnFszxfvQYI87cieKrg+qNzwxd501W3ruW8C75f40B
gqL+I0hti+Sv2745XlWyiZlrQwtXrTlAmguvEBnVz3rFiNXia4V5DlHfqIDHmFDk+VJrvOXhJcqmDFN2

YyEofkzSrhY0xarHEyFEJxutHU/9h3tFN9IB0ZjVdvSUTQPYoOh72SK+/hPJLU2kPDKmxKgYBw1fMh5k

ipyA6RCxhZ+KuxfNeGpCtDG7X4YVRjNfQFowyLKEy9
JZGqQe0gc2Wj0nPfCZb81ff7QAihlkaGXFDzhoGpH38Sxt2YgJjU4NJwydw4/ftZ2A/S1MR3IjtvK4Hx

RiwDT6wSQtzYyLnSKmEw3jifk0iiRaHHit9KiFuTBTT1A3Bn0u8eWy3m9yY073z0p5Rt4HIoyoK3jRU6

d9fJwhlOYC5d8OkmgJdOBnxtgvDD6pqNNhb8kdNXm3
TRx6CyPSINTvrLLILtGCzhxzS6PejlA9yk0OWgkx+0fw4/XgCM+cK9nAVMRGXOr+OrRuDfjZRbCbc092

Z44yfRj14y4CG22dgINIg5GnmBodZ3Z6IVheJ1PT1YTx0rvTZtie6sXNU3ZjMqYT7ki2m/KfHMHvR56q

4RGSRENIhzaXOx+fhwQJdzfJSXmvAeTjAall102ZFm
spoCebsGEJldTCUcPar3Vk/NCENJjQ1PPuIv/iF4SMdv7KpnukEVGgMYet2TFAcu35bYMx9xyCU8f1lS

fbxs7K+MXzyHcP1i1ZKBd+bgRaw9pmCHkH9s8fyePVtqqlynIYCIq8jAOtOZe0WBsEaph1Oq/XLKTfkO

uxH0IUmPyOKSc55YeyCCVeKDQTX+BjECXcV34tp4jF
shwunklrV8e1tEPQSbN4MB6V31N78sMEKjKqBir5VqqP2xJd1zjmK5KHz6EbvEoBjBkknIPKp8VpC6Ka

xZpIpSJxV14y+eLvIgOKN7/iQIjiZ+SummuVzOrq89fdSB+thgqMam/PhxQSoHXhe6P5gBv/g3SqjGsQ

braulio/RiVgzZWYhkWEFyeWj0rul9pEcyGzPJJOu338Ok
2kc2c7ZzReLdWtC0NZ9F7DcygBkxCSrNUvBDWyIEh8lwihlsw7JGIBKRZnr0X8CygOP+smhEOTp+6fay

90ACvrN/BwBmqnpHfpl/5XEc/TuHemZYqBsmh8GaorxvEdR/rEdo/iWJBV6Xawh1Wa60nIbpyfVlqwiK

XzWNSC3P4M/P1SbXKXCRc15MHUC4dxSUnpz+bJOCYL
sFNlv/8h/EYnoACnY2kk1lmkqW6617ZedZIiwQmozaFq5o5hncXC2ve8/6//o6ckRUGbT8H9uN5SdRXI

lbu7yqYlErx8mzcmP/LOPfN8i8HPXs8uP33M/A4GvTYElP6ScBToVPyCLf6XZPOztjwMNZjebM1zmOky

2Oi8wfjF4ajfy8qR/Bne6aVqodD+OEiv5bXIW/rF2F
prngz+B3Nq7ILF5AMuQIX6ENM1WZQdNV3wVhjXCVvsmcryoXVtcv3hDVj/Ly/cOnHg8JGZTLCQLQphd2

FObOZbeWeFyV+P34tPmovIE+3ti7aTgH/BntfE55vWrgDi1e/opqfDWwnca3rixNk2NsNLcxklqZIzy7

BaYoxHMYJO5YNFs/7djFd0JCFzwWtciKNtyc+dtWZA
OgJblAZMJUerVarWltHUukoEYH6H5rRD3yo5DzW/xn/Jbc6KFachJM+YGcycSRLj+ozMaYJiX+zxyzon

AkhfprtNfP+LcGX5J7JFxpdw2vsZy1ewpWs2N4HxBf5YoOVXx//2pa0Xi+4NC8zZ4nP0Oq+ST2R8AOBh

5ei/V6MH6rogVb/R64ttdvi4nnSa+hUEo3duzufMs1
Rc/v2/SlpK5S1YJAKtKJqwnlAdn2uLDMD+OGzwgd9fFlpuyStnt3npPIjmhlJi4k5rkknW8NuyyfVMEa

0IJkqMFWVi3UqC2Ytdjv1p6U7VwXc9mVtqlUrw/nPTpdxXokf5WMSsyUxUnrWaRKwwjVjYbh9ZOTY5Gf

fj6pDIBGkbePcrn9Kek3pToYqFWUXF6xS3sqTUaEMw
8OIxV7NpMEdioELvtrsA3ACib68KYIh/IU1eTkRbYShHBLXwe/eO6flrzIaN7KkkFUAoEhmVHgWyJ0HE

2q7lOAl83hrvUBT6MN3dzJ/i/uJ4qBnKzgwgtR78r9RaMcAaigH+M6PuSR+6fl/gzwZniU0a7SOYpjis

+Ox9o/oQvRFSABcm0gfX6LAv06rB1+N11+R9uMAuIP
e0dFnByscqfEDEKohEVQYfFXf9FbX4mfi4PPNLNfGvfV0EoUzFDrVam9mKhO1wSUw+p4hENhElKkDItk

GH/ixDjgXr2RvNtPwGqAARaw1rYDvmn5fNTCE6ycrsouc8Twx+Bo1cPdtkV5UVAwWJ9I2DGB0+/dVNqI

foctNJvCDZHB/jNENex1/OLT0iAxyOpco8cD0hn5C3
vzkT4IJSBqYNiRTvi57wgtawPlNDNNLXPUemxvejI8whmqGMXHn4u/DWsJDEArXr/aRJTCMhEL0ePaCU

RHPc0ucfnBSe9YEM/cg3o5k2Iz2DbtH46Q0sOxvqhyMegJLmdF+oH01gADJ6PFG3ceGJrQ78ziqmHEgd

jT5C3nJbMaW7DA8cnWaMqvhaIa1ofI1o2EsPjN9rij
clJOt/FLpn2EsXlud/OskDyD1/G1w7aYA7M51A1r7V1zSaHI6Mw6iZgubaSkzClIPo/yEB2ayMWwP1Q1

g5tkZ3eE5KmG3wlj0gbuVWAPm0FODFEc5UrEjY3KdIHGuAA9EEXwfLX9ppqMga1103m+wS8/3DOYXAG4

fz62OaYm0tYRrTcebsvVJAmfQJ2QjUNKBz3s/8yOvE
ouhupB9XYNMIFoFcSWJBmtXzPPI3tS6pJS0jPWL0qPv6DTuea5gsgUmG3nS5lGSB4m97cqmnVVnSU3Nd

3SA/S7Aj2f9f2jX0fmecjKlc85QoFYVtx5Ts0GSJdahbU1TymT2E6cesCQDmlrzPsAKXBvSqYP7olvYS

sUn0xjq1mx9YCemtbqcaYiUQaScveaPXtplQ6AFPWm
3TvAV/oKpm73CUoa/wwoKFeGg1TsMBKFzsSxaxPWekNtaNL1e9EUiSIrWSRFOZgs2MzWK11cfNO9w9du

KT527nNE7YSjY+XtN8e8Es0JB3SnezmkNI6tY6eYAeUI567fMf8P39efc2+h3t6C9Um7hVT/b4UDI9Am

k0hty3wEwEqzS7cuDf13iK5TICfKCocIZZ0JQHiZBV
TUUowF4ji3ZlviUBGO7XcupPB9EpKxOFjp6Mx4/VDThAhk2ofe9WApc0KJkQqD32IWgeB9V+fqPoPBbN

saVgAwYW3W64XBnrfeUcPdfY8OY8QxdvfR3/P6T/bBRY0piT2f2+3ywWE+wEHHpNn1kW3OVE69k4zW6L

FyrijtasSO/Xa4xnqvXbxNuJ+HpHrcxEO4iQMunvvj
HTVVgcU/TfO5lcc+5gbj/jXqBxUAcCfWAFiUX8AAzpXbmLPwPlAPjMvvE1n8Tl+buyLeIyGdF8BYvIxl

wG0z7LggS7XSqXSiCB0lJYGpD5hr+8m7k0ivfRawESHR5s4SX7SxR/OGQavOXr/CTPRn4dxTRttfOPOu

cyB63Wk2lFvldiLhTaKYfxwkIVafzgbxqfGyhE3uzy
L0BE9apVW5CNPwUdAmdMiBVlMWJlNwJl/S8fNKsqUCcjEzJtzRGAl99oRr3M8/se7+Iu9OzWjuqw/0r9

jLzkUl2NvWnHrwt7NWQm+L/+Two2quIQa0N4JovQcOfm0/bzkOcQ2fD/znShtscOwn26JOUykGMLLPLe

O9ZfKYwvOL99CtxLA/T5gidrAZeUE3MBBNcWySoa6v
ZVh7f7Z5bi7vrVp/LwDZfqgKSwdx/tU5/v82Mlg+L52qNFNJxniByGqceGpSMJBR4mSVRNVv7wA7OHB1

BmdN4sz9qc6xACNEDCnLMxzvQ/KSGH3LbitQEb6/+C6V96wBhmUzpzPl1ChIYFTG4/QfQH9YkYaWO6Tz

v4PzmcE7E5/3ko3o+OQPKkdR9fqcWl2oTPbt4R5m2Z
giaJBgRUp/NWSnxDu+8YnjudnNHnAyjDAeXs5yt+r/8c/prs3uvX9U1/M054/SOt5wCjYUJxWyyHk9g1

sIf+1WjH9FIBRR1/h647AfOo3+q07lZY2PjyAgHTJCmmQfjjIcN+DJ/5ucQ+Vf9ayzHUOlzTyqqQeFEE

Q2i1FjC7/cL2y2e0MPRo/VJJWc2iFjQfqmNP1A2jev
tjYr7rlpAwQgMeqK8351PQ5TdcJYrnQd/npBkFW7kcj2Lq4/KhtqBM1BDVgNFtIvvHW45aKjhpkf1NqD

5sYxEQkOFTVAP5wm2EL/08Vb8KGQ2vGsum0kz4CZnRU3x3M9g2YJZkSZrEkxE/KqlYBj6xKget66TT9P

QKH/nky49SiIKtIj1sn3pBr9cFPQ7v0SkSXfgHobq3
LyDZWlcEXQ4CRwpMnId0kU202G7I5aGTVRmkVmlqCecs6KDeLyP6HZOwMMcDlpV0uSqINPUAIEOObxVA

WoaN0//6fEw2Hjm02at6IYFQFhRgcC4/dzi123Y0xlD2JXIf3CWUumJbJW7PQ24KqH6KuIg+0odwS2sQ

WqSdAlrw+Rt1nBwafh1M6xcA5w9lBSqjMynLmm0uqO
oPght06k3v4BQFfpD2ZWHvXKDUE0RBjp9ukBN+PebCmpzwy2Ayi1Z4+fBtGcl2Ayu872qBt2/hFXvECL

o0vXz+Kv6OxDiWOTGltoWWvmNKSoslalKkUodCXojjf0Cga23y/jUSMXtMpF2auyptEnoGjzojnrnAP6

4MQ+497Du8uL0eJyRQqmVAeWlAAgl3cXf5kc8mX0O7
n7hDKKj6ysxj4fa/SeQieBUSuSH9HyUptygEdimYIO4OCdbe4kwbg3h/q4wrHluToqBT6mAJSSW7fxMS

LoA1nkS3KeJVMoOl6PLN6tMiUSmlqZL9KhjYGky5f4oIAr5w+em656SFrCAtKjTiNaR6Mi5kp/pSON6j

0DfKn07V0ZIp0wmc/CI/IlpDUW1Vcm1tJNAuJIAAoy
P0Ui1Rd0+Fhrj6jACc8Ds1VMf5AXRDvg/5wRQs3gg8JKhK/xmUoehZhB0vKz5VQpHtjAayo2hQ9q4+qw

4vLvtDPOLzUHKtMgx9I3CMh1wy8uiRGxyWfnY7II9uwTYwkkueZQwt3YIzAesXuYU1tIy9mI/BrohbHE

q25jUurQeAILvhVhn8gzlqAPKo3rnqi3Rnc9/bU45u
GNSA22hxagm7EQ6kdaOmd/qywvnlW4T+dPtz9H9d5iJN9fTwROuc4jhunGyyVRwwDMgbnuEiu448rGgo

POsOWIHlIs+/YLHpR2npnnyritNgk5bbT45OWTW+AWV3S8j0U+2QNZjSeezVW6tED2B5AXEJsWmnZzS5

Pkp3Y3/5FEjkI6xVuv3/+whquaNX/4Ob4edoj/Yu8t
o+Zfwc1WIpBDEUOjlOTC3E5FS6TJcxLyJDkCMzTkWJGEMUfNlz8zWeGMmEJZhkXBoHhu+M11x30ihI2d

476/7/woqe7pg6uy82OkguWut/Y1eLhFHY8FHs0rX8vF1dT5bbFqU9G3wpP3cShYfUjpG1RavjNT0kK0

GE2hLdwqspc3u5jt0uoNWFDRdoadyOJKFpIl80lSzu
+1tm2MVFZpyUwdPBzND3gxktXR8CeG9wEK+pgx684FgKFR7ifml8n5FIt+KAXZ9tiyAyvvMPNR04vZ12

q6pqoLNGnSMLwhtI698i0PH8DUkON3GTbCXyuooQbc6Fm2OcV/9B1V+O6psKAG09vTs8QlPx15DsGmU9

T8zLm9HLc+0whuKDX8dD738HkneHHdrcYvRAOsMMPb
kH2y70CCx+DetpcsDaOxZcUxK+WEpUtM0Qj6vT0Y1U52zwbKzXgYYrjq0sEQ6aZVa+kf8OE2nrMKnByW

N/JxXqnQEOEUCsuy/DyP7xhMHRwqxgSIkhvzLUvFYby84UVm1vOOm6MxAMo1COHEJCpfAJ33fYZbRBnn

FlWyQTlP8wBa/4fQYou4j1t17dwxFrP9rYcwUQRMFB
DDvz586u8p2Vx1alhHccAx1PuQX8eCkPefu02cu0rQPrnFdCQq1I4rZuS79sADTFJQeZD5ccpLHyexzp

QlFDnggv6ctIfxW7LLRalwdMiEFJTmr4waRK9MH8XYsjxMLemOQ++x8PKz3IjPkw1OiupsA48/WyU9vF

5/S6J8CwMJGAn/3kMCBfW/HqJMoX4rhr6X+mTme1Ls
N6FVTcOu4qg9ZVJS2tlDW1TJdLwLtmaO904OGk8xtrSzJPFnNLlO2+5hpPZs6af06cjESjln7Akgt9eB

TSG2YAjZbYTxtO38H2bk/DzZK7MQJwfn1WPFCDnR36ASLkbPt4YfXpanIYgIN+T924tPaeS1xsPsJzje

jT9dVuEOaNlbTgMdFURGonXEYHnk3cnggE/g5HKasJ
BFE2NAS8+d6xwaMN2phKSwR5Bpl8firEh2S6wEjGzt5eHZEzpeJHkZTzzHJ0xw7Xbrhqpr2zpnMKJ5Uj

Xl8Vre+xIUxdWxhWlcsHIwOUf8HssYdAgseKGOOceMHoPsc9TwRi3xrFgCrlEuoeNwlXqrmpy9QHeyCU

hfvWrtWymWba6ABrK9hEejpeo9VYgCXZTCRNGx8pf1
rXNccsdPnyVlWg/dFJ/wq1phqF4+yRTsgHAgULFnCvRYuMJs7q+//1xW/kLgfqQc5XlH1Y6e2CDpk3pZ

QdNICN6DYVelePfiahEWvbyo0OPq04yCGHhf7x2lB4LHcKZERUm9Hg2pKmLPO9LOc52wyMKhhVS42PM0

a2Yb3Ak0HKkGocV7axkywRavdFPr7HqPakbIN7WN1Q
FvT2Gq62CVAsfRVYGxdKcbKDWvefrk8cjZFEwAIkYrhX4/hjMSQLaHUi2DNlf3BbLj2PnmjP2eSxnf3g

xOwdfQUVIjIJy9r/8m0ovXmj2hPrR3A8orwTF7Ky5aBYkzoaDpdEk5itLQICZ84KF67i92EpQPZrQbSk

oMxVoiAjNk0ue3HY8whBj59cFYjkwNsDaNnrJnsgNj
FlHuowgHqekMCzFlZbtDnrT60G6zzIKKR/cpMzYmp/NNaWaZpHUGmZew4HuU/G3FBKX5ZtSt7P+khrbv

3ncK5h6ReOyxMaxXyo0nIhympbizZzVexs0widrFgpxVhI+SRVBlFM0+4C1ipV2ki73d6ihm85lb1cac

GUb/T3d9wKwyrd4uHzB4GVL+AjPJlceycuk5EV7HER
E51zr+H/Ku8/ybbJ9k6hkO5A1BzhwaSmkOVLNobTTtxPMEUAbifzMal9ZhTEf+UBAF34pOX04gj47dWG

JE2ynMe52OnugOtO2BQQMeynBmVL+nfW13eh6aEhaznXlXysSySx1UDEZ/Yn8ceXSMFkk8oftj1lFzVn

eWRPxZ15n+l9c0bSeiM+Q397H04+9a3YBaKTIpcmxm
LkJkeQFb0Akno3bIUGvd4EgjSXqRKE1YuLq51YCurIvZi0Ree97HnBMx65DmiBhTOjAe2YLcxCrkUjPG

UbWietiOP0aG4lO7z9K0APkOI5DwKWeLgU5QFc+JfjackRvcKi/K101oAGKo9JvymAtgaO4Kn4KeHAXm

wnUVcDfLbhy8Sexs/DsahPlVJCdN8/JyqdIqQWK5WM
c5C1uSdcpwxl3LQ6K45g0v0CozP4f8+voG8TiBvpJBY+vKXcniEC6r6riKGU6Hupf0pwWXn9ZmZBdWTi

EGS4LhZ6tSxPzygQnhPvxKvycj+6iXhpTyHgujkkd3oRaKrnUTAJ8jidM1rkVkV4rL/m+nZKjZx8CStF

UVA4rVbcob0cdJlxxNyrjTEEZ0zxN+fvyBxivDtvZw
cp2OMHRj/rMnwfnNVH7IHksX8cmd1oUW31bq8Esm3CLrFtj1D9+oqZ8uer91BPTgVwqBAQQdKZwbxGfE

Mext2//+bXtTdSRVwu4uSSRaYFS/tgoKQsgiI6UtJRjG/HqkYdpUzFF2XOF4NYZEAlP+cikAmxXq/zwb

IJtyQcO+j61YzBmf2YOvK4BOtH5VAneeHYO5OKl9DG
SjcYWkmtZ6G4TUtkhmlUF131cPm1a56MxHP2yFCO39/+s64xghQkWq9dCm1pOgh1UwEAVV4bOsqhDOIa

/For4jO5xvO7Pg6FWNixVNFGjPZyJNi3J3XUBbgS5mrH6lxSQA6HWSPQnUuZpaZq2Nhnk7vS+ocpl/Ng

IXHWF+k2hgvSQUxUyysV/etbFG40lV9wI0yHjyQHgG
3QZ0m72w+gFKGO6doDkF/Xv+bYoNox0Zw46RiJAHP5EpXgSs4XzsYXG4Q251uBCwUTaLhRHqOYLjTS0z

SIhh0ekJREEPqXjgk/tF19geHUeouRtMin9JdJzge8Ra+id4RUixDgS5R6t+SaRrq8FFy7AnDJ6O+Kn1

f8rqYo/8L+Uvd9IChJqzz5z0aCKjbEUUr+CTFuW6Hp
nzyA0y0P7FOUG/rYLpoSOIn7Yl01iJI6V//s74Ct+FEqR8v1BdeidJhd6DkkrSOqgIUyFoyyTgH2mCYJ

POa95aYhzLfiSlM5v3vtZlIx93lOiJIG3FNlhv5tadd2k0Iy0SFD/DjxK76LyOYrHtL8aZ8RCSXWJd6M

i99BhxXud3riZYKTrxMkdN318l+9F5gJvcbbC6SCqN
PgNuf5NRu3FJTEuyhpJHc/BTU8kv077FKufsWpfdzKtlxkNEp77aydniBa4I35YQHIarvPjIExs1xuNe

OFvnNxD197jyBuGE+jTeH6UanerWtkukZSFUiqEcGzNYdcXYAmTGKeGCmvfEkZ8ISfPd0ypbUXrKh4oX

xpZp6+llCG8Po0w+Huk/wuUKfdAGJVeM9MiY2tPl0O
nWLf1t6hpyBrcqBVhhd59IxmDoF2nydAbr9tEvOJfc4iLwdCkA3tlALCUCu7ZvMPRp8IuaZuFWti4HZz

GrKZ5Fn23g2wGZNnx7g28DMBhBtUlWFik1BLlLCk0iLAQYeaB2Y55DCS+g4CqLdqNLsWDKKlx+w22cV9

7RawVcny0eq3z6ILBIUBxAFHPWt0d4/e3TBpcF21Jo
cp7xW4aIBzMY2RdSuoN3ZSwR+uOc0lkJ83FIh/BSYlJBhVkBLonD9J6gCHAbrYLImVVr8BerPHvp2mRn

5Do73Nb/57JQfWuuYr1ZlqXVFDAJnmwReTHtJ/7t44dh+dk1sug/sbsaSP0lE+bpEqGkQ4gMHVL+82Tq

Pjw1NaTV1iIlTZOcScNLhZfG71CIqAT5MdTbxRp+m7
I2c39/cuiRpMMA7fJoHG0BlZAYnTdj5ATyjWkSOHH64iAQtWKxBUxojBCk4ZwIBO1V/alLRBbo3HK/AF

zYanShnGa5Y1cFlN+caIMvu7EtQ3y9vOEeEqfIsUA+FrtCCpVvZ6mqbtDY0IgL4+EtDIY9DaulRupeRp

OVw/jvCwKkXeuD5k3QGa5T6Hlpdhsgi1LB2TCV01Jm
eknnQt3AE27YUvvjvhtxPMkwRbMsYYRtjz3PUfTsDaMoLje7UbQYs76POhSmSHEvijRFnhUlwtZoZhRI

TKgJLwoPldEzdHduj7SYelTw6TsKiv8+TpA1wv7cCjQMqWomPN9HhCZhEzVpibRxF1dJdvq1JKmxtgGE

oPGXjhtrFg6VAQ6M0fL+SsLYuPgsuCtEUlgfR387qn
Ecx+pJBGVmpL/4vwSmq10jUpiDkTbtsihLyq5yjc/lj3vxmjlwL1GZQKNTKGcvshfm5tKwB4v+nMvHjf

MzdWoF2UP/11rhP950eFXlNVYYhKtPxv/SksSZb+NAr5+8jOI9MWhE4XKAms39wg0aDDO77LFCiz3CPv

0PYaSKko5KK/90d1uY4o2tkJzv4202k+3STG7a8vp6
jMPlCJplGecQu7SbTqMKy2gryX6iKJUhKRXeVGTlaGj8j9Kp6q4JH00082/dN6lw7BeFk8fxXLdLV/ec

9CImHUIr7mR8ifiIdw0hHS5bcP1ag26LfFGeq0Shei8KTAwOwiUcggeX+yF6FAQoG5IH7wsxLD9zBw+T

OJIHhv5OQHZ2xBdxrNeSOzHd3Pu0vEoeZui4dg4FMH
WjzWjQa8QL0ls1KM3GoQgAWdbFX69IEG12+3PpCjFw2F56rV10yoIXNuubmiAq0yC13g9sxT4Ep2Qawg

UNK7LXnvLcRQ5zMZP1HIK4zW8sHEfBmxpT/QmfJ8s0Ke/bJQ9Wpqpbe8qKG0NApGc99ND1WNbqdKaswy

Zi4DpwatXhveD9a6uwmleOoE89kmrTeVfDkUnnOZuH
sOL49/ZrvDw/Ad5zOZRKKrXe+KrrqcRh4nulvBvpXwXQFSOU3MrsKS5KOxddHtEBHGJlY+kALQbOYPUr

qW0WYYw2huVQk73MwhlHuy5zKYyIcT/5SyphzoaHyHXkPnwm/zHb5GO/Yi0LJiNJWOKJGcQ7/fPQQKN4

AOxFM6aNSOK+dZmwmmvE5PGcQaPkSqRTK24JRjE+pa
Zrj4liPbwzv5QzNJOEfrpbE7Hz0gmUPQX9+wJUXDCbRzONZmA7vgXwLShRL1bd4WOInpizFpvcWELLui

aSXiDjrNWiTVEOD0UEXYHjLkawRI4a/lKgdHUTdtygGcNoq1QbBdA4PMhBFtL/yhS4qhRlkQ1GzIUDS1

HthWhguy3z9BmW7rJcKL0Z9UWGaitU/D5PHfM8B5ue
QZAv+rBDYTVydogx7t+hFuqYl8PcjsD/SbeMhgVGctbmvunNcBNUvwV2aJfq/oEL/0zozTeewkmFVrpw

iErG0yJ8KW2zfQZe5RlblZv5s0G14yco0QFPbyxMLQ/B1MmTR5+82gnswbvRozpRTKelxbMYifkoPZf5

/6qvgrNipR7UBPkHYtpli2VCWdUdsVBayinXv8cUCF
g4zmEO2T5zbPhiQ7LGYAVHGWZ8TLn/7Ns34qgYG5OyiPInwf9UgmUAG/Ld8dDoXb396F9NWvrytG27IK

pXtWbhB+po+1bODCHIo7hFZeNWMeg9B7IV1A2gYrz42U2/unraahiEyJJABPrzNlVlHsmSGqa1BzQunK

N3TCNdSq+Kezwax5ZRlsUH+/0eldaJf98SK6NgqqR8
qg8mlc7QG5HPNO1VBvcErsOI7EPatNQfNB6bKHcOzn/yk4z0C+7ERs+D5QRiSbcys0ag8NK9bks+J6bw

pbJvkgCOKfsvugRuDOmcw0BBqFH8TkvgPnamsCIkduBCclwr+PPZYjb1NwY68p6YetPjnFtdR5RNgEix

ACAIhrEM1pjRU8HUHIUyWy/Wd6LJ2KVJFXhElVxM8c
HhneRh84qWsBjQdlE/6ytMEID9bYHpzChueiagJYj11bdAd6IIhoX5DyAnO4Rq1y1UV/CQwMzeDwN1kI

MF0bJhYNC9RxuvTfus1bMoEdhu001VlN9Tg3nK+ajdmPSNhreRwtuRn+CJ5i2U8GSLzOvSkdIeZY9VpV

38DFFoj8NgkAo/HgdLzU5na/d6QXu48bPi5LcrWzLs
MefYXeRL29xujEyDX5lt/x6w9/AjFm4ALQ1wFxHS1GKWmVj0zkyMUGT8MoxUlRb9qDuYVfUihP34XTjO

J/Z1gaNORNj3iMDrhlbWnu39I0cN/Ogz1JP3g53D72rjTp9tJixjB35J4ETLk1SyGlKcYQjfm77vQ7id

IF0e019Ipm7VCPFk8N51cSHPEqItAZ91QdfSVn9BXx
0fxjIpwfU/QGxJoj11S4Jolk14O1r/N+c/GemX5Fkb3AchP331Igi+w0RwRg+cCcrsIR76xYOBZd/NjY

w9w8s9SEQTaRsh5FSeYeAffd2SL+L+fegD2eN4TNkLQYHqpZodWpoHKoKyC5sj4UIOxcgJDNA/ROtDEb

XOR/hziTrcxh7DvfJbMjgiOWZbDYaqh8Ln7YV8jrQy
4ofwcYIVY781w6PgV6MHQMyk8HNCFOGn9dltAKkAbL6p65ad7dU825rhwi01qC2QlvQGa4mkayTSIAVz

Q1UIgqz1PAlfUo9lRcavfzqFyNuCYReuLIZZ4lfu08xBGtjA7dubMasrNQE2EfwtC56jf/mHUmq2t4sk

DjEwwJQWb/CZKCr89tySJ/G8eH2VKZGUvASyh7olqz
lPmY0N2HDfYoCLzqv0oLrH8JRVwBjOL3HztMY8OTnE8Vtuz2gUAuJo45MPYFP+9l8dHRhvRp7Fom9S4X

mWPGt8RrAiOF7TpKEH0PmbEJPCGfFB8QkJ04yijFsm75KyIkoRkoIDV9+Tdoa0isgZNwcnIAmIqIGBec

9rq/LWNdrGsDGO7QQUxV6Vl0/xDndLzns+chOpIHbC
DkYEbyctWypyU5J+MrnMb6dleihOx19+VRdj/VhDLnddO56iClJutuXdI6374uIaZtOnln6qLHxDrpJX

HbRTCvi6nMlYO4jbJsnICww5Bw4QGHUMEE2UX8W4WbVS2LNPPZ3szLDFj2xq2NbbMdj6A28JVEzSjLfK

KDFFP2WRTa5FiJuEq0EzSG9/pJCpMIxeEbnrHpP6cS
bNgHDw7ERodB78FCwu4oKT6xlLW6AvNKNMC5JytH91yyMGU4bKSYi+eLasCJd/8y/aoisCclwCJt9iBc

2MsBuBnab09rXSCbn+yBYj2n/Uq6bSXkpc4PYclzI3StoN0/7hF3Ig1a4MQccWAM/RvlpVIbVC/V4Lkk

Lq6pm3PpfMX85RPZn9txv8ed4J+4uoOPcv34MiT1L8
Ms3QzVPYVYEh+Ugkx+UPWSjXNMDM9APfx7V/1Ohxj2Wt43UtqlpgQHeumjGuchgxTL/WlRs4EvR6tIHj

OTNGDqOrE58fqghnSv9/lfmwFtfMSS12jfzLTbCsC9iZjFJVLH6wu5HlVLp/i50P/c+ry6dKS92h3js0

y4LY205t0BDUE78iJr/ZPeoRiT31iCxep2cH4MYaR3
+fbFz9daEVbruR6Gog1d9W3fXvGyvhqW7Jd/pKYsEM039WPNw1l6lrP4lMbRgMQ4XFTYkPCJKzHEpvLN

bDLVZDqMd1VTLlCGuMGn20eIr9yRq8nwNw6+2OU++SpFvV6EQUFNnMPRcKDQSfuSGRKZOdrVLdDB6OLc

G2V9LqgPc8f5UXNM0WVAAEKo6v5GR22+uPIUvudg3J
IE82I1xrr2wgZXRxHq5JV8w3+EwarfdK910WmMeI+1o1egCXMQAWWtYPgWGxWOFbODLvquUF74xYMrtE

4cvtvT8lBJ0A8JdLgiKto86PNGwO4mCx5xNY/nzXlaOBNf63DyEQfGQaXNe3FHVxRteMjDd0RBHPueK1

U9dcMFQT6etyjCn2NZKdzXb4ma4Uap+vPURix3hxZz
RNwkGsr2rmni986JFcvzBWYGDcrqs1SbQnsF2so3OTew3wgGT8wEfDF26SvlMR3moc+cColI6WoDuEVs

cpljXM2vzWLnfk/O8DCbJp3mVTyBbyF0MEAuR8/Kc0WV4K7vB3C7D2ok8dZvzCxUarIROyvRZWUsYoEN

NyoZjj3YozaPzyyPiexm0ZMk8JNbDWHdyq3aRkOv3D
V3L/msmmmvr8mxdZFlmXV5r4bzEzbehLemQXqTxrdkraD9sS+wCzScbQx6AigdGR0hu2AcC1NpM7lr4h

OhOmO6rFvmrbyaqxqJuGNoUXWDzQoIXf7BFkJZCHeXU3y/aHkAbniqUeGaVv1URq+IcLkvsavhwl2bKN

Kg/Fz9nQRmXneV1zQt7Fpt9K0RUrzRiLbPuG0QcAaa
WyxZl0H7p8SB5kQzp8/PthmqKY+naj08c1hih0kYZEOcJEUA1kHVr7abDYShsHFFea+HU2gsF+9WG9Ct

43E79p7wUirLSIGybCh/+hFaGBFbyRq1wPDCpM/loBmAX9Vv6lQDcds3M01Tb3XO9Cwkx6Ze2phZWEA5

AREzVb/RF+hFvJUIS8ZHMbhzDBsr0FLBm++Qi4Sx6G
Cfpmww1G70kcEfXyPiKNSGMgoxWCpABkejA5fgmfBx+vUtL0YXVaLCarn/8yms0SfG9Bt0clXnd1mFBs

WAoI/jTISZ4NX+XQoi4B753I2UvR561HHgrqEfbbuvDGdgpAYRAn1yhzliX3hoRRPwIaE4TglbnRgCMm

wci2YQRQvYtV1pbYFM1rkCnTnGg3w8JE//VLV33SGm
pvC2Q5PfWz9OAN/rFWIT8UbhQ0hQZXg+9t7RCWTqV2LIcHVPmSFXIYg9c3x1wvgfymDvR7bIsua2+c4/

FdKbeN2gj4gHnTGLQuN4UJPDIyTDNYkDaV6ib2m6N16aKLOC9IHP9xILRj9CTeUarnMMJ6aPyZG5QFku

zDlI+S+oLNt7dd7CEqmkJgkkllbcdF30TLwv9uWjYE
q2qHRRoXAO1Un4Q3n439R6U8NA3xPl65nHWbRqxY52UffevwyHQVJGIb/ZqCiv6zx/cu0RhfsPJB3neP

US6l/oJFjriHqkdwW9wI5JaEJFlgo0+V45Q9YPqb8E9ALOw5xTVuwrcNDToSnNKtZYXDykeR6Zc0fDhI

VElHZg1QD+T7SKIMvAdKtagR5e/8z3ZMHP3vYG+vjh
+4B4xtSYMywIo3okOsULxR+Wq60Al8SAVQj3jYFQ+C76QqsnMcxhobVpnkXCyf5YFdOngYZ1L8UlNLzZ

2xunHHdzBsbygnVMLJgsZY6cw4y8W7uODvWvsINciwpYZnJJb2brlDVtoQDdaFLcuhLkI3anYx0AEudC

XckuFSbn6vFxZu2GCeG2tCMZL8GzH2eO/Qji0HKqLG
aPdFMvP0IuMGuwAHmvLT6V35LrdqVGpUXUQ7sICxRklS2anzb8HrWSvIBiPpSGrOltu+/Xt5LL8jEyf6

fGGU5zDEHIgWhDRVrOzWRDBTzkuVSVNHlkrBZGL/AxC3fyfg/aOv5fuI4W0XzVwhGz3Qi+/MBVt1o0QM

L/NiMqz+8R0E4aeHX3HmTDNtrTK6eARzEk5JeJbKG0
f6Y636JJ0WNbfTtxyHgFyXjf88jrJv6eDK/7D2l6UDRYFaBO7NoM+1hnfQKwCBtKyxvKqd8FBoHYoHDi

jKm3vKsVYnzqrdd9ViPOzu4pM5pmqsbkVmeO+Esw9MSwUTiRcACDFJzGnW6ToMHFjMzOshtBkSF5Fhdl

xBfAWA6BoruIPsidFjM7N4B++TGcdaQ6u2nQsCobLC
e+WlBeQtgf/6fxkWlG+4hWmXYehNT0aPPZv4+Zi17a/Z5c0p5jhBOO1jgl5etYXl3B4pKPqlfb4nZxkm

xsX0e/HDWFbX3mlrw84nJ6pNB9yh2JcZMSAsSHLdG+yqOswBpFr9FMCFL+hd22qyMO0A0JCicUl/c46k

s1g+9qzSJ5vhciLnv1jPBFfWGD2kyIILRaTCglUjKy
FDszGbc+mwF8hblaRkXZ5yB48Jke1nMLjiwgMSHhLISZ5lW3FGlahDbHLoIWD70o7ec1eqb1en8pHCao

Tboh3qV7lDJSNFL1c2+R4XRro/TpRBjtHAPc7N6CME12zTAl8Ir9QZF1Sp8Clb5R9L7pTNXlcKkHOceZ

iQowx+2yADkiDpN6CNNZP4X7XbJZYMQN9LY1TWUVCf
qdh8/8puMcK+MhUSlolY+sLjfRiGlIcV7TqD0UEWP87ipVdOd7G78FF16IfyMObDO+GHOX53ZPeXqcVA

0V1Y1xsPlnoBpIAtdIDLqGlqhzAaRqpnBtG7ex3y32r1LsQx/oTwPFUGHsFcwPelEXF1RNkHUsVUVGZn

9whtPWlOhI1fEbSxe7py7yTVrHyhF+fI9C5ggU5fu8
TMCSFYEHgOWjYmeDwAjw1mqxcHyhj2xj2IKJno2OKj5TJwaRZyBLhIrpoWzszGg1e4FdfKIm+3x75Tpf

kh4M2Vl6xRll3RTr6Q07zwggga7bbBkbZHuuZHqp4y1uMtbv6iC9QMEWOx9MmJmTVZqeIiNXtHurT4vA

BJsi9ex5Jg7w6QJArIJFOqi/lLYjV8seBm/u1lcZNz
zZ5cW/DZ8fQeZPYKtXtEOlzWnsTH08gOFv9QyJVXBj/y/wEnaftPzVZAv/yJcwOnwYX31XuBV8c5yT+A

UhIUxArDdCIwflCX1Ye6IMtyRtJqaXNYQ7rhqAfzpgx5D6/QcKk8V0Qg64C5TdBhf8LSC4u/JqIPXoeW

lDA3T05/ApSMY82xwzmWPXKLFE95kZ71Mz9atU+Pe+
u+2xjjcckPkRYvO2hIsQm07iFRJAAdK7ZZJVAokRXp89Za5ycTJGeWwFhZINyX/wcvfDRxkn++2tfhr8

fmcKDMxJHwjuUxMFNAppH6NZJEDkYfsG0/nhinuA/64eYlhzrv+MhiY+aG+WhIB3CZSjSju6Q7ef9Dpw

8myUxHV3SMiJCL/qDEBewf5f3UlFOaSpJH6gAeHmDr
bzrKLtIl7tyN6Rz76QSEoGjioK6NVQrgTNEnZp/rMXuP4TrDkO+Kemq8PywOzQUbPa1b/HmD/AsxKeYV

8QM0XjFJ1jemWhHPBVt9oUMOvJhk0pG0dXD6REdO2rE6OnPX00pSMPG1/zHXmwUzd8/MNAgo15/NbDaq

hO60Fc6Q7W6c+4NOezvCenWrPD3qpg4L6Q5njjBUWC
MuIS+hdigslRwu0LPR1iyAWYaoir8zwAvoFERH+EiAEq4kgDOCrggmRzEi54l0zfQ1m7qrdI2ACzaG5n

nLWWi6AkQxzihH6HvF3o1ZlCMMLEx1Fn/bFGvrOZ3NNdDMwF5OAMPaW64MsoR4ttYDuDE1G/Ez1w+h8Z

necuDITxfNE+v31VLK/tuRkQwZuDmSBvvTNE3ThBoP
8AWK71vLvk1JX1t5HMFQ45pGUoUC7Wy3f66SrXWPOGDlwZcyp/SkH5o89eYbtF485fOK3RfigOcPtQXp

jddee+nAkYYu+y3raXKDSym8+7A00HPmSp8+YU4H1grw3sy/LQf22BRNizkRUqztf7LcXaoq2KXB7lXB

+nE01Qz+JE6G6VWHDFaem00fV5Ibwwpo/VUuJZjN8i
qHx59NAHVz3joezFeqOAgQJ970ZmVTlxgNrHPq1+JJ1qnZaaDacfLE+eot7S5F5hzgColkWVBaVWVh1l

3G6OpNPmMDVpUxnysx3uZisDe204xd55S1+R7RVEqmqrlZGtwQu7XyyYqNZUOv4WG3cN9ZkjTlP/PPFC

HZ599ZWm5IdEKFyItmbWOKcYUkBDEXkzLe2C7iyg+U
Prr1JxpYwFemCYYFqmK5GsKKldaAqULGGdlqWPqMXBsV8LsZ5TQZ6AmXNa7G4kk7ho69cpes84zx0h3o

RFnSSAfNMIQzpPf9PkOjwzBY7vCabRepDdvVthv9xyAmtUXBDrH/Ax9zALECWZXOeFvOYGLgg2tutjZi

nDFh/5CTb8uDMhghNKQgjsWDUkzOX8W7UY6hYSH4Ff
in6RpH2QILB0tRWlXB3dFswFPQOP/X2v2Y94zyGqD4rp29vr8d46Sa3JNcTp4ISsepNCR0a/XgE/qbV7

LJI5TteegTg+BguSV7O0c47CJxpIxddOiY6QTpDX+TFNxEooDp5XxgCH5lrYS71NgmHQZe9eXQnMZGIo

zE9SNpkQS3j89mZTnMvrkMLsamtwPo9EAYvStKjnKC
79Rinhkl4W02vUM9tJcO5lxwlcqLXkyk0AMeNzsZcTQraZLVSqST7x0Nh8WNjA66kcnIx4l0P8VT2tre

HJ9rJwkNaQJHkPCdmYljlL2jaSboYnO9AeM6l7ly6K6wEESGbuhJdpELGGWuFav1NtiHBnAwtS4li/Dc

HBLJtOwEwLWt9T6IQL7eLbmBzmvoo3+N2kfuMysu46
CIRO/ZrPbTlEBDPefGOdqFKKFeoXc/MT6uF1ne8OFJ3UFD2QYJcwKxo0iJtMUdGL+juHLC63xdc9l/f13

shiE6Ar/bLaQTktes5fEPCUpiiXnPH+/PNQE9ac0VOxJf/Oa3bgttqkSGwDnLGhZubdMjtxvdTXAr2dS

erD/ynVRb6zJ/xEqFWAZ6AIBxUIcqAgsrQrsoSFzcb
BX8S2Ezh0qOIqLtVvFOBTOd4RHoshzlm6u30838D6iibKl0RDUhFK9pM0WKCvfQXN35YiHzxlLRaopBt

cRuHN7QZbKmCH8BTjmS1rHL9qLrrbUcn4YsxFr4pPIajhOZtfyoggk2c7k/Of6/5s/eRRTb5U2FwvyVA

8UfdSlWFq/Ifn9YBYcEr+TgkrWxmG1Z8WznIqCgapV
XxLfEwYewZ/avCC1+wSA5xmO1gIRZoHyFWnfe1wI9oHkTDoKBqk+kzYv74yJ/ezkSFWwIdJSh8eFfNi8

WShrMrqIDBnhroNYZcY6YQK1H7+6PwYywbadYAojfldZcaRZL76Z8b1pZAjFHraa1cZ6tEIyzPaKDpnG

03lSPPNFceEO9iPBfnc+ONjnL3WhxcWisUuwx1iQhj
4pb2AQ90I9te8f7ctP265jMEJgR5d+5cv3Jj+JEoacaInFULLXE2CRS61kkgkb7bCFtbda1BmL791blb

rn9vvAcusvYf1UtVJBr97xg9n+KkY1Kyxb1+LxwE7ZxXUwtVKJT9EYzM5FxwakUx1bOq877QcTkp+l50

i+BjJnHX89ySIp5P2pV0vZkQxSSTGiwQh0yvt8XiSN
L0gaELCRRVU3ny1sVU/y9d49/4N/6Nf+M/B/v25PsucWSxakyKG36ofCxXmQjgnI1b5l4Qxib8V0eLlx

7vi7POZCWMp/wZG32YKbxvwcMfqXpXHis8i1zJQxcAlsSGOv8he0f/0GzJ/cqx7ExVta3oqEnZzEKidw

Zp0TMShdHN2JF9KsTDdefOJ5XUJhxx8OKf9fryoebL
2K9VuMP8EtgMxVohPPGsGEl67AustrM9zaXJoM3uc0kvRZYiDUfuLh8kIEJ/MCLC0ZoBqQqaWfgswTt+

7qAoOKDIv7dwiWvmX4gp62byG/voAP78fZdCuYMia//4WZBARAZGLPkCjK3zevDAJ1HuK4MqI1FpmHbY

6/mHARE/HkjoGmZF4PXCbPnVU9Dd8CUDQf4yX890Mq
73xK1KCFnxQlf7o79QC+LwjnnTrVyg62v9Q6EhPoSx/BtRDMcddzvFK1B1F9Fz3K2bF0DXakYDxJ/Hwd

ybCa0HjyAP8D/BXG/Rlfz9fRRbEBT9lMIs8w1gy+eijfdAQRxxR3sP2TcA7hVn0hyBz0ZcXypcEmI9jY

E9oL/mWmK57+QhnlKDqOuYkIBuMy9BWfavG8k7w91w
ulGlHBo/CbShFEUhBahNlx34PRpW29AeBNHm0XaJKfDP1z5YQga3PqW/fGznXlwat/qcEiyVZj1EQSlS

TPIHQbppAhqLYQog00p7+CyXeIXeTAqFZluxboJagaZXcuUsCN6voHdxYrDwRIE2WOaOfETMJmgNcfjk

qECHVQPGnnRij6cdGjKjp7OjUOGohTnRA44GxWGvaY
I3rzCS393yCv9w5nacv+GcdbuiD+KtFAvwe2F/8g6I+3g2Cw9niRd2J0FQbo3BSH8XMhsRz3L5DhuGFB

FCyxCHntWVkO/pw5yE1jH0Dodln3Dm75BSeKngLLR13Xr2TDmucNJ4ARC1a2mgXfdI20oF7ZWgUuFSOp

2gvsPrauwsrOGol7b+OphgT0u7RAZPxuwQKnluMR+O
d/HVxW6S8rgDBmzlvwzkUHUtzoBJtqp5+tntqhiibAdgTkagcrwJ9itH9POPtob3SZtW6KDQIn5ogKfb

7Rn6qFQzCfcpF0fWSZwyYBQZc8zl6ASYV0o6FjDvEnAI2yERhjYGojPaUdvDO8SG9c0d0QHxMNRA6Y6a

MqKeMETlHZay56VTjP/b3gHWTczRODel6OQZcGrQy1
Z+YUYQpNem1A4CLIvkPseY9T9VCQyevZj9rwBpiDXUAZpD12vOhTShIX61Ymk6G6ITwx1SOVRsZAVQmz

VGk66j290iGQGpgbcYvRp4P1m8J97VyJ5BFCz8bYpSCjkiH9tX6D964LRm5ZW4wmHeeDM2bvl/4e6Gof

YCxVp0af+gqX/LwRtV+170fA0N8eW+6ggFgpwZdUx+
fTvHIP1wMmwCUpo7ArcQTRQavmOlygwnkNrkDKrrtEmvFA7bpqSgg8xJiovDz3qTNaH8WgPvAN/ZO5eQ

1WnwCvj7M1ItgJ2RR6wPoiil/br0bc2sIw7eI+u1UiBE/Kd0PctTGy9PRAgiSsg6PI6UDhlvbFChBGwc

1jkr+3zbMxzXyLha0gnSYpO264R6IK3xtl3hi3adOM
ZiYend97RLyKDt30j1E/gJFpNbhQU0hIsyyMiKdiI1wKi3k9exf6r5m4QAQtOQevw1h63ZnRBn9f91Gy

00ZJmoLeU7ApnFP42hDPbSrCp+V1whCgdZWc1dzsr6VFrjYj1MNqHaeFHkDJzvUaiXvXUVTEztjFJGs9

nQeVR+liI2P8vY2TMfA6hZjfZhVVbfNRdHrnpPouIT
9pm535wiS7KcM7pfuwrk21OpKdMjwHdnOLfg1N21gST7DVUnmxk/Mbz1sSTw+B6AfQ+R3gNYHd0kPj7n

PLk3GQrwuJtu1i60osyEWZkduTdaY6IU3HGKZ+dU1Zpxk5Q/HBYAl31MgX21AKW++coyBugKMF3O7pPk

RYSfIUebOs9wOsYhzK3shiiVe1HxTjsFpjogyWCz5P
xkQ3lxfLheR1aIL80U70yyK9haQet45FZdzmAKVDAn/AWCVMudBKMNJvwfWYrD9qY2K47TsEu5OxtAtL

5W2slqnQ+ZAy89RjM2V/Pq20hcmTqU20fF0WJ6Dwl4Dk7kL0uEgVuViwLzG3q9JQ/Pi2/bpKthI4LyJL

3BzANoAxQPRcTa7YM4B0GaOpLmbVPhCB3li4tjRujO
XK+ZB9C6beQD+wEiXeiG/dbBVP2cbx/j6t4GMjQkAz7cKeTwa5sO3TGMLMAATmn7P6LLxWvjDUk7EvLf

ABHISHEK+5mH80MdpJ/B6yjupJoVCSCVnGvvPUBXY0lseDlS3Nh27gGv+0Uz9UMIXunWRByekBwIPWZLz6D7uF

XunW6/0tCTAaA1bz73fZhnSvkciDJPW8AHEHkbxfkE
5kePovubOqgnSJDKTXlFSbH2zdJ/RDGI41QeN7Rih2FdiOZPn77EoytiQvbMCvrJCSaPLiUHfwLTyH0E

eHKivPdfOt/hpV+Q77Kd9Dbw8MpSIut/gKknHtiXt07YREsA8/64D3Esfxc/TQ/lYQReqJ4+nUuBogWD

PeBmLJbdEVk4tLhPgqKxwKddUBYIxI8E0rEyzCyoWY
ohwnNeHHPinTwuu7yMql0I28/ZYeUwjyW1KFw6lZ7bUu5etA+tXvC7f87fNPwnqEsKMHnwP4keCZKNMZ

x9mjdV2KpX2stB7RncfTZQLDN3ox11xrGoi1Qpa0Q35qCqYJsbIfLND44y6u51Vb8TZGy9b2fdTeLgus

6/5QohIrWldUMXmn7/xWyqYlh1ErnnCdtH9qjuweyN
zc2ep2zpJ+t5DCwEwOWG0SNrZslb7uRFhOiWmkFRaLd2yrz29Y9kpHNQmvHz19G9dZJN1n4pSfTZ0Wkf

NtbZLVFrcu3gayNTYKUQltso0CUd8V/FTRyRynOlHbAGY0ufiTqLsECT7x7tAtDUz7ESPOATAoqEUipS

iYuXCpGufMd8iW09JAStaPJgeeqw3XoDMts9JxQXDV
ujFg/OxmSVU4t3PIQlD44sU1ZrpMUIVJJlmqtagHuVSHRnS4Y/3gtCR8vYKGxjH7JQ+Bet+pYv7qIR8J

peGuR4x91KTzoKKu+NurdsfqO1Iazaqji76q19HCKx1CZ4DZY0MsFSyR5NRlksot6i3o9JhuZ4bxwTPs

HpKeYZ4b1dYZS/Oz8nUfaIBC1dtIkYxWldw1GpZ+8T
VEaOpeg1CDUd8Lc4pPxc2/jgfebcqci1FIzkxRi6O8qATzCmULWnDbbTxgHjNTKHwHHjmmmvUVRmRrGv

j1vJSpyU8U9v2vSYwELwkRszzJpDtKa2UCCdQtSQTilZS5fU2RdXzg43FPeumUk6P713XOAh93m8BVPA

guBOlxdFy898AU3UtYfji8HXdTHK9I0mohozNSuQAJ
z9zmWEh2udKmIxbxiakrirOE4DnyI3nZLhY/gZG7u76szkraFMb0ibekJiQeiXSzc+kTLdSRlTvW7Mb7

5hG1lcXOoQmqGcP3t3TJ6NvtdP+Mobmu857okFoi+70lRRbqX8lO8D9R364tjakbVRyDOSAUfxhjnmb1

1m/E+IXVyHo40Pjrh8QhmODHbNd+4mSkcsq8Q1/l36
zG5H73FR75sm+3YEAVDi8ElXm/ZHD8KPkChA51+rVVrFgVIuX6RKCFDR45ENp72n+/fgy1zof8rclJ1A

Sx+HLGVOCXGtapa49vQOAD+mb0nJys1xTTk/jwqtuO+JHhPcAR/sRvtrFIINAp6EMC8YpZvKO8h7rMoh

ivv92mItkDPddsDXyc/py+DDwBII34D5xiYuqh4CPN
7e1kLf4XMWHukiaEgyeVzyG1ugb5A6Ca3/nDntsYXV4c0cdCN4jBwGmJAaxQ463Xm8Rt+dL3i4EZ7a/N

Uk0d1VZuyl3F6NTlAdotszqo5xr3oTMWq/UOJcdyjJq/NO9epDDENm3nRM6SuY/Uq9bkpcXTttCw3ZvL

jTmZmpFAaelQZ3yrjpSIGeDXy71zAtmNTZXgePt8ei
44o9pduaHPCOG4gPqLJyuNfZcojZKF//wliRbkwlvZF2Ur/5y9RSmCBLsBuOkQTGErIimpqe0AYtTKHA

kzuC795UNgH3OcUk7eaZLaFDzuVFb+bfX3LPduc1PWbmMICoC684CivfaZV/0azREbQqFRuZ/PPsMy96

TneVUApfyi0thSlk/Xbi7ABUZcpxVCHe5HK2jAMQY2
OmY7XAIyvqfMDSCqLDT5EKl56U4MjlviEXC5fQ/wBX8ybxAU0Xl+CayzGTHoPqgiAcdNXFfn7YwvbA//

EmqfhVMKe/1A92nOe9sBNwpEyeBoIKljAHwOpVDwM2I6N+qTrUl3VwflalLL6HzJUUQmKvGJ5D2eqTgM

97LyYiCTwRXFhW+mKT6SthGhcyQ3t83W9sYscrdhqY
QRz05CB2dliKwL0ZLwguQThsvLhkrRypc5wK51JA9xRQ116lEa/GWQdz3SchuyU3t6GFu1c06FQy7vs6

9jkC1rSdwyI2WANxaJbL4fDx4H90ci3qkzac3hkCaZ28+PvWZH8WU/rYGwxjj3xsdcX6ELyM6lKp6lla

r8vpcc7wjDACuddzlLbrdMlurAnQ8rMr3K2dfPHe5Z
kd84nTYef0OCr4BIx4eysNiCser/Ga4ZI4CchBErYXKVbPVEpMQGghvmuv/YF7CHDDIu5tEETeo5yqR0

3D9+79BIW8NFKLUUJ4wjCmfECY+95BPOtTzBFbsoElqd7qgGGVIYaiVwjAUvK0WhPM4Lrx4Km4EAQxQ2

kdnaFy/bWMO+2SBhY4Dkh1DMK7ckIHXTy6JZd9tVC8
/KX9XMXbrsqrd3APf4ioYawAoxm4ZVEFypY0i36T45pljDIbyWoBJCvhMAP/axdTA5QeVfNvI/34Y+oY

Kw77hFbjR5gZtVA6vJ/xFkfOAUtvVfWMaDsHLAXr0YTXmKxW7at8gI6OCsnfJJHShDQkRjDnOOgnZksU

2I5UsDzG10/H103PIJpEraGCQKCdkTAumcuJaRNcoc
ZEsFylFBlUTrniJOqk0rN2owVaI/AHP+QlY2nuMcyTzH6weym7yyt98f/8k4B5Sv1Kb0WkcwyA/yhFG4

jKSniNBjpr7YHVopXlUrYHsSmg8/H6O+wZU/7TqCEwhPicIYbvxbhPFDTjWgacaMWnKRCGm9TTBH1DuH

UWydY6EJJjDLnK5s4SuFvoVggCMbbsMxyvLzo7aayn
NurAYXYOvnlTytEn311tE2f0Y/L2nvKqlHAN8NbuPQq3YguakSw1RRc1upd2quVD36GHIDXYtlZnZyVS

B4BIAO4hrUF4goLaJ/cs1rhxKtIz5MAwlMUEz5xwElJxk8wtzSnv6Uy3JA2l9cC3bUxcQHZ05V6AcLHT

/ScRJuCQfdiohpQucmwS2O7jh2sjn8Z0UnuCqwg95o
yEDhK03xa0NOp204JUtha5qms/mQv0gZFoTU5miutd4ZNAT9YQsDetBWytmdZcErL8m/DVSLEvdeUNEZ

PgvHVe3xF0J5XsNNYMczEnr23w7Ai9qKuqYOQgMV1zMG3EhPtcdqnkgVlpSb4gotDeARvIU33QGK1B60

b/pbncpl1ymaMBnIS0Sscb5taqx6gWLwVdguNsilna
F5Y1qNEAmJMff9YcGHt0W4trQbAmN//QvuHLZ7dwG5/VivfwjkbDF4IDYGz2c8GsJQq6ajsHcCiPmJaL

+mg8yRn5Be9usxZaiCtLj2e8BYRL4qtY2kfHNGIshjuA65jLb4eLPgJyZrzGp/8V7zsVSpA8cMp20vfX

vfyGU7iP969o84xs1GqAo4im4yH6y5FmWSmcgYwaNG
ztqg/5ERbnFD0xgB2W5FB2//mw7eCwzXrGtxfKapkdCPjF8GidGBEzIHQOC1yHyi2Hl8YXHmybTcvVWZ

n1FoNUDTY9ZQVL7b6yUE5aH+oEvf7X3qfof0xFNSSAUV95MUrEPqGmAhLvQmaX/zy/h5Zg7uzrW2JzWE

0NiM8L20PCVOxR8LjWDVymK1IMbx/f1L/Foiy7ALqP
IFCS8pggvCLwc5d4VAipmvdv185JSV2oX1mzUiBCB51ebguOW4UlrM6ZLg6jlommZz4vnH1XEmC01U49

GiHABPWDWJzLnZZBedGrMYZ+W5oDh7Eo/zLXF8XJMlEeiKAYkAYndjyV5zCedUJW42mTp7sFsSVFOSnj

dsDQ6KXD/6okfbB11zryE8w56hvycjanRPJ0O4bD/H
/j2Cmd4pkmZl2ULTMBKagEx1/oorhlGzks0nWM0H5MBH1iqq2s3QuEDA4j/7Cg2mdZEDU42tOLFK6M47

GmMU6VaMRnoN1fz/ig1j3cDZPTkGx7BC41MegLWKRza9KSihwMWLoHImaRtIXBdi1k9F7wCAsFl3DmQo

9CN1PRM43UTa0ID/PvBr55/fj39B/xktSfMW0FQ4PS
ONfp1FUpit+CJ4SoaaBTYHZ/JjbM9YiYZ+4fvQ1Ed7sMKmkA79639jt55Ltv0KapsHXC78H9XUVFVjsH

ICgoC0C26ICiWpjMV+yqSvfgTFeyOhKCOXkhtuFAwiBCtoP8EMK9FqWoQ2S+3gRM5jyuoe8gN+nWABPZ

JpwxRaF4yoWDaFO3cQszGywStIguoA2ISFcCANpciJ
8aI7OE6I++NziCrOQbjhfMF1hVrwawtfeA30gncdwEek0NrCyQtgmSJsypDeOf3w+/SD6yuZ0phP5sGB

MZPspr3LWdVTQAqXT0CSc21BdsDJ+jkufpIBS3Fkq+8LV8cDy7lxArd2bzlOhIFRbyrzOpOfeOmf2ZYi

9jaSDt7sT0c78up6Ob6ECeIVa3HRZ3RZDP/A6BAe51
H818yqzzIaxAYp4iBtFlu8RAdxNjMCTrUAXfkpI1GMbU81+g0KaAqPB1ktiTlocnH7FnyO4bZzSQNXTU

oVC+vD5U5Okxydchdk84TUyGQZTEFJKXJRyleaJXAkNqLfJxUwU7UCFR000a/FjgqhNwQKB9KGMFQ31R

+uf39eaXj22XJm0pNDM332G9XV7wjqa9T2mKViFn3m
+JN88nmIvLbFDZYO6JmpqXjkYOU6eDCUMyt0UmYK8CgZfGFzwVaDGZTDy5/K6geVBPQRx/KUWpWsBZcT

WTsVZsoTn+phFTN1tOiWpB2Yu30tS/fll3yClBn74uVNo8tJqBoFlqsn0pjjdrZfTVkcXvtJrSgtb7gf

RKnM/20q20HCOeUCKIi5OiMTGu/vi127R6znol1/Nf
x3/BeePnZ6aESNsO9Vhl8B2dJJk/9Jb8nWzSKexqLWVkWL0HEpsLX8L1OBHMpsOyg70WZYAHYglybRJh

4oH3lZZSKbc3QAAcEnViilYfix1+wapLFGWbUDb4WD/EJvmO4BcRaWM54BZVydDO2BvupGP1nRwikZ9m

aLjzAmM9cLpTUYGLgxWpxHQF7bELLFb8oSn7/YQoRn
aKYvl6DU/ZJ5VDXHzB0jiZEuy86D8jU4PxQZx2MxYMS2FLsSzIK/4KF635E5YtNRVkUrIJZKUIY3EqzD

b6JAqt1AWJ43fFky4cRh0Sj2v30/EndrIY0mJ/78k0EaiTePcu2as0c3QnYvHppRKDhps3iBWS9/mDIZ

w8oVBFc8IZj+aHss1tY6+LJhE51wC4aYrRDlux9RdN
D+icYeGoZQfJH2Ha5cNAAjCHXOv9tNIG9aApVRg1WebYibvuBPWFlO9y6u3T8ZfeCaG5yA/tYmJrH2qA

y0Z35LAw7R+rDGZhaqGOyM23CFes61yJ/kwBjn2CD5rCnko2N7MdeGdl8mkmu1Z2zmx0n1UWSpBiELMR

9oHJQsGzj02l5z+0Fqd9qUkuKC9cn2++Ae8H/CFtv5
TxGhgJE1PK4errYRmII83poIJrRvrxs/mo2+ql9OfkRt/pUIRvHGw31uXUQEwUI87VhHCpAFWMxMQDY9

A0DbOc99zxC52wzGTELrHHD5TiBHhTWfbdADWa6atWVC+4nWQgHIgM5YvxPQjN2ToQSmGkSvqDE3y+Hx

ZSBxPwfEObrzH5QdglH4305v3m7hsPCQNEf+X+S/yH
+R/yL/Fh7zkLGvuYfcQkDX5yEFUsZZVgNsrLTxt+kXxK9cXyX5e2FR/aRymt9rHZZMhcC/9UeXarTMnC

DoK6QB9S4QFE97GYkluGklgv2BM4zmvOmarWJf6rYwo1rIBIvjeYbYbHQiGjQs1sGRkwcptJIfH6V8G7

97eVLFEAlLb2BZtOg29xUCsmM0zQq4dK+fDq5MB8bI
svVfZX61cKVnCW8MinlpxFNVA+RwW87D5JQxACUQxItmTAMCXUFTruZfo+qXt9Hhw17u+90Q+aXHK9tR

/gJ36NSJaF/aQtrCxHsJs+vVKzAZr30yT1ZttsExqB9IZBWqNzhE04k2VJ6AmF/zhhw7Vue+ybVQp0Cj

6OK5Gtv6VOzWzyAKYa5C0DYknDrRfESeMMO77amp7U
HUFDXDSz2EzjsUrlb2v6JxT5EdIGTAZ4PmT+uA4AfhzcyE8kifQSAKP4B92YOZ5PIe9VlPTes3zKypWq

mFX3UlK9CcHfiMEh1GrfVBLBHVHIB/RGcs/Fi0fQM4chGqgXfx50m5ku41Ijh9sXNJQPJ4kuQCv6PltY

zC/bGfkYanAWYRHYU0zN4HG1mvrTQmGS5kh7r7Xt5B
5oaS/XeMFa19bK2XaYqoytyjmn+D25YbwHERipxf7x1j0TBdEIaIh9Tajflt1S6InhO1vnTDDSrUlXbW

jpnbJyUxMoKDAdVSye7TI8DYfn3EPTXap1fk7lgSEIjL1iYtjGK/dmcgHi1FuEg+Mb6NLTfdV0OjdZP1

qbf/WvXdjJ/jLadUtudx7hvV06RnciUa/w5qHSG4lT
9+H3qduft+QxqHZOEVUJQFW+dIzJ7vKezSS2qZVQfh2cB/gVxuB3i1aqyAt7oibxZewhfmwiVWaKikAY

/kZ08d0VmZPikT/728qavP9/9CFMprCQCU4VitXNBv1C6aCBfsslZFVk7OHeysBCD605mtlRnLbGdEVp

3JHuwH0hob+Z87M/gtN8Clakz7B398Oayyxcct2xm5
er2eodf9pu0bLJr82zV+srNls+naHck256fpxDwyZ5djM0r4DwpRySoRMf/yomwKigafDUtVQgibZfd7

96YPOrsvpBND3QRf1fvgl/LCfQc6RwCGE+NkgNxDjx9JDtyUSHoBJbvY2OMzJ+84KetyOVQlfKCNLXRx

a8xLPWbGcO8mcsCFfu/mu+VAQjARAtSI5Ywy3GGpkP
ZmR01aHociQbT5D3T+vZTzf2DhuccfXUskmitsa4Wqy0ENye0AgbrkWtueie97vcU5/qOAGhys0YnUYQ

iYFakeHnJqJ4rQdwrj8+j9c5gS6F3p0wpXUwnKzqfXTgE8FNee87z5ImJIdAdD+tHDODtolfHZEIF9vR

TayG2iarXurat0MUHwX7B99NmStinV6HmkubJckwSh
/GCAjUWfFq2kI8gbWE5rCJdjXRWrCybcgL/Li/aayWyjxGctjoIKU9yZzMj+Isdwba95YnbE1CbYpXGU

UCd+++e5xLi3e68EniIfhId/qs/Oj6KweiVcp7Ff/+DQrttKaeYe/j2vohcY1gOCSxVYgGlxdg58j3Zj

IkYrx9m4jAskXCLnpvuao+O7rremO1ejafHaRolHjy
m38ZSmtd5mmVcQd+vDfty8CAdrrJ8eR5X3j6mxCL4t8qmM7hqx2uWGhKuyYlhzUSnPaYRS7ikRls1TKT

eopmkgKf5eZZMxFrpEE1KPyaLR+NjmI+xQAIKLPy63+iRzUm5OrTvuJ+Mxynzt/ilJa/z8krZp85po/y

kVCim7jvqfc3Srqs+BUmT3/Nseu3lq7bGse+UGqQ4n
yCAKJ+CEckxzqd2lq0AUIxpA+8qsI/JJDFZsTRq+Q4sW7zyNKMWKJHvNylBTxZGGgQZ5780C4CB1uqMe

emuNfK+IAZkJVV6O4BBPtGTfcAYTkaM7WilqqA/FlUB8zLxf18Jc4wzBlIbUnszGQoTjQjYC0hpMVVaZ

9aPU1b0tctMB5qp1xdMZRnJ6VAioPzKrQBTiQeets/
aroHr9g5eiqOwRZNIJHFUKF4Iwh9F0cWAuWWHxhZ65LmsWXqu0pUPTIfbIsPtdWOQJMJIewGyCOmLluc

7+GOUyCgnvyTfqnSKvq1gJz/84kN4MsGkWg6E26bTSkIr4FBnx37+bwgd/Nd2h9X91nvivoRUnju2zRH

NgMcQE/E4VbvU9XtcdriTjUaF69o6Z/v6hXhhuHe/y
aAN3DqarUKdAYYN/tvrQ7kIxo5WiCfWOH4J+cCsULnBhkl3im5EAu2ujBgU97sueg6ZP714y10YbIfXS

2KE/pt4Uui1mxxBROMJSaTiKEOwi2fZWxKDBwQQUbXo2V2G+twZ9k+3dOHd0zdx9qxqUqFSGbcWwO4xH

k9lGZCVuY6ykBIOJLcqyUNlEvfR0URMIIkeI0zqe2v
2VLkxh9Wl8+2sh14MCREpvTXouo9G1KGtUhGPVwQ7JZ0JAaCKcUMq/7bTzO4stdpAj3g7MS5d4e48842

K4fvplFRu0szALb+aL8krOYnVpSTy5Esv4rs4hIes1z1X6gcwae+B6HuD4Ezb1Hcoc5BduKHm5mI98cE

32rCe5DAxukS9DJB1CyYrBaUM2a4YIELYP/FU9+5ay
viXpCU7qxiNoVsJ8kvzXxNOJ2m2k3D9EnggJXgyoREwsNAEPcu2KhjQZx8rEnIEh35WX0jCARQ+0mXvJ

vsseuAGJyrTWf6kUzAGiJlyEF8YlWalHMXXziZVJq6HNivazK5+g9NQknzmji4lpx7W+XN/hRgJtPVRU

0QDfllPxLudtTbxk6xJEgjpd9Cmv224bSiasl6rdm2
YYZohcUGM7c9VFQgi/qlYcUrQYRvkpU7A2MSyk8NWFq5ozmYbY+CkEdp2yJd70dISKHUje981O4BGuK0

u9bYiSZXUb4mymHVO6ReqvDoAXFSuxUMwzFS3chBJjGXWKF6izQz9zoyB7HXNXzepC5dgCcN7ubEIRjd

4aBgdDTuTv4CmBzWricNO+er/t6/cf+wPrJ0HnaqTp
y0OykykTnQzbmrB48UITqBhBjQ0RZUoj5STC2i6A2omyZQLI0aVvMl0PSsefj1VHTF0yI+2g+KzEnyy9

R8r27PG3HJrqKq7cyv9YnK6MNolyup6IhdobsnRxIFogbC5UmZsUKrzt2RdGMMynbJCdMhEx9Sr7x7fH

GL44WgmMZb4H1Vw8+RViKJ38cPagGYskl5KK7iU2zZ
uAodVPXpe+1uOX6bAC5rr20VAB14OGg2JYavdxE6Fipy4zJjxB7VbtwLk4k1Xk1nOxdj8BnrQt/d3EkJ

k+IwEJe1yZyjUkazkRSwJJOF90mzDaqXsCbw1FDfwOQAcvEH248NfQg6g1SEF6evvoX6cenHFmYuxB8l

0+PnlVlhqmzYJmZRH1dMGErdkWFAmHB8Ai3VxSBzwJ
ghfoGbXQdb60KSJQoiw2hcQPau3PMVBECNBICtAxPuyhT1lwvS4Tcv8FbiFlDU+3euPAk7lOW+YTT506

QMMlX8vliSgBMWRRVFYDixOlC0zNjPRSzAIlN805DeLREtrTUl8Z1h/0tbPHa/XRaawNfLx0AUD5mMsu

8SSafdO+AYIrXPGCwdXcqe7jVhHnOyFkGUyachN7An
Wi1EpQFC0eYge/NaCm9JhEfBgUW8uXbp3/NDJQ+p6isRCzTFfZPrWC5fMR8qF23iuc6hd/eSsqlbaWjy

zlhQ/aheg2g0ERPAOWgmZK4WOZtJMHCLIqIfoSrFlm2YLapF/dGVVWRLcp8oYbqO6L70PsZcN1lzQJcq

mct+oZ5/qUbmoVpAnVFvjOo8R5sDUt1RFsugcWs6rI
ewj86pFq0j8NNAA6X4QfQtjZBaB2Twddkycp1LhyekTKyb9PaULg1l6ObMWvVdt+pGik5D6ISyqDcktx

ULtOUf1eVUsG04E41LXzccMivgkb0fpsyZuZ51ZG58/IJhj8eAnig5SJZlzxIyWXd3Z7reQOPZGL+3RN

WWjCd+9dKNHFOjzy4wiQUQKDepkc17xVCwHkM3T2X/
eX0CK6wesBf7TPi37cpz0hRuAh2bPaI9VTjgN/JgFCQJBWxdvZn3VlUgK6rtlGkwQ3s6/kofyuydyPBy

p5F0bmWq1cjKjRn7u26NUT5JoTgv6uQEChtXWQT6+FBgVJ8yP5YKHSMYg3okL+b+knvY7+mymQPtV3uM

RxHUjjLZmC4HwsBKPa1FEiIaSaR7uAIqBfO+MPx031
9uS32XD7KvUi6saguEQfTkgylmQUx6hiKUaZrxikw9llScLMPu0gdhnYP+RkhbHsAfw8JGV0BZaocb4H

+OMz1XisDlBb8P078/vCDufNFIk/A6Qy3m8bTtnU/0cgerNDPP+3EiJFObnLWk1CdhKML7S8WbLSBDM1

bWhZPognFcfm6+y/bNyTK0d2F2lvqb1Vo3CnGnhZgi
H3QL52WGVZoPIwo9IhSOf4k6PVKm09rWF/6xqL/oRXaOVL1XAatUPPV8x6HiNkEjOJ3/UFROu7IURUEv

5yJBMF/Cc40utMeia5c3HBvuKt64rJISCG5dyieGuPEKsptpI359tbqSNGoaachbvu74ndGH7LeTQ2sL

FaxkfQddl2DWznwAIP7Aibmjz1CeCx+rgkSGyIo80W
04t3cGQLlfqYNFWFE/N8fj3+RN/+vPBlLxOo7vSv+n9ttgq77Dcr70qiiKhDC64l9Z/hj92yfeOFenZ3

dKPxYjtAlXa+sbDSkajChHYd6bAy06d42V4YGjYmpR/hfXMa+oCwcCDWZ30kndnj6hJdEMPTfai4grr7

Fowr/sTmhFfjFyRUfkUKO+W0zDPjW4iCDpPLGly3WZ
WiQZTNoQSXE0kEF+dwJLRr80R7zR1qS9xCKB/0tT996tFLdxTHfinuvB127gqaq45dSqTxlOSfVewgBE

rkfCwAN+ERpYg7irh55Cq0ao9MP2amlfpl8myk7RZRQpFXvF5qgzyrnszu3onHLq4V+YHZSj5R0Mcptu

4mpUcj9uU5OCT7QJzFPrFTxyqD822ilF0hpkiOXsZd
EZie0YwH6uDAYYGFpbORelljesDzcqSdg/a9jxAnMWBoBHrN37QqQZMhoMbJdvq8GS89jnup3pFuzYy1

qU113AKemtSb0cdCQEmkcaqXnUGPsHhtG9YgNHnF+xiRfAmZ7zu5kuU/wwC17zPLoxEWh4No5KbN5haW

SankXsT7evw8x2q95VokxHBKtqBul9nCcr/KI0aIK0
gdmvyjNF9gSm9AhW7rlB1q9nb9803KwYgNI3or96bOzmy7CRMyfNXgh3yT5mtCDwwfI2fUj+SmmCCEd3

vkZI6RKnAUgtPatpgGLiiqc9MDWFwZzE3Dp5jj5H3tctGUnrOTmQ/IDfN/IiMHvBmGEzOC3Cln3adjHQ

+4l0VY5KcN9H9hH9AFkO3i1oCj+jBC6zS9qFCxJD+z
XY2qnrJRiDcqy/bNqEKKaoACK78aQvKiEM//GP9khy/+w53qUk870YTnJ0mUfDiJZ8QVzuctWLbnJMhp

apP0u82GEzmE9eD2RVpZSlgdcLFLB+MnAg5RjtgFiRapPWoRh6cAzokvrpAe3TeQq9iASxCkxI73QCAa

RfTY3Z9Bfu6SWBrFcKxRAMGVQ/Vr1N0GvO/Jezub+b
k9xcCqxlT8oCq7QBgWIriqDp2je4oOCXXwMgR36g3b6RUXJLIu0UhU/Xmd8vQbYkExip1Kc1lL/K3oe3

hVQFbz4vwdLDOIXmKOIoRAcXgn8Ff7VzB53ZJpri9h7wpz8p63WTKEz4jb4CeVCG1x1An0YlG4s8jQMD

6vejw39nNvq11y+GVIwNNRfqm912YDxfhDmIsol2Ol
N0DvPIXVyxUZL+KwTNEiEFhKUXDZLlIJbJ9ClQOvv/azxDgYKfogSrv0EYDvXdIwgCE+PcjsOGuhHvOj

3zxD49RNTxv9j8nU2J1E5qp6VsYiovvL0QBgL5mTF97MN7MetAtv3G5XUeSLldC9pLy3F2UukDF2NUKl

bTFiJ3w0sU2XPoAEUT6iT7gTiWU86W4oB7SqNjWhmt
DAMELF2wyxg3GH0YO/FNONAjmViLWTfLCdNeig29lYpsQUfcpzVfnwJ2R7UqdvVD1ndPwapRkqP9tgxj

AORGC9MP7wGwYtfNuisN5CUyLuCZPiXbQBY9xU90Z5N9suN4yo/59J6pDkcd/6OIY4OAfvF3PU+PhX1v

IAgTrhon1W3lz/6GhHdWze14q/uM8WZdculNXfsW/p
a9iQdlVOb/TjYSQd9lx2egcHyotoPzNYNDT5/rjguvdpQ2tvJ7UUIEL9HBxdGVKFwAEDJzKMrh21SIxq

yUPWFPWZ113j1XHflqYbb90zDc6oVl9erfEf0hMzax4UrQ2z4LMuoOU8dt2vx9qIYWAsBuT9EI9pCcGb

qrJg50HEi+go59YydzLYpn23yDEL/FeJfli7xef9S2
iUQx8Oed9IjqxNfnh7ERdv6upv7gG7JB5ndQCBt4LY2XvJJIMMqY+oZEAziz30eh4dLpGJlCFmn29QJk

IqDAn/X5hq+T6uoH9Syw1nnXLkVDWyWTv/E3hn+/u2Eqj1ohoWbQ7+Efdi8lbD7byJVKl6wTjiyQu+T0

OJ+Oe1xdeNp2fWzw5voXxAKQm8VkSCByfewOT8fTEm
ghvbK2t60YFgTUYio+cR/8g/6r0BeICOlDugensBrkgn1E5krYMLxjImxH8obzlS1Q53rV0+R7YKHPse

OfGWxktg+oomYCk3Twc4jamPVv0lPjlj4OAK/xJvD5U4y/MrH7ZO/gzoud/RxBQBFcCDUbciL8s+NSF+

/eMDE/plyHcnUc8UJ/RLjCk12jHqvM/Asns9pcmGu7
o9qnAz7LV01UNm5T/75q15WiKiJAKz8i7eqa6e3H4SWJflxO5V4FgdvymfMIqAaVLD1f4r1YSQxTYKDR

DqyJt1Af/2fR7kaBgu4VqLhLeyPRbCHDRDfQqidZ8PnzWJWxpsti4c6l9QipLwBet0dqbk022PtSIqiq

u2y7Z2bDdMBF1L3C0Eovw0I9ZOovsIXq5npOJNmTYf
yp/WvkdLZbiVzcd1oUl9GoM62ZjCCChL2KwFKIzbq5W2S3x7rILbOGdCSzU/KAR9AAstFxbg64oGNAaz

U9otIynTzfQ3Du6tnojFoGNAYrX48vh/6DSBjcx3R1eqTKnX1TM50AriyDiRrfOajs5qd7gnJ+L3GH8c

4eUq/VSUfK+Hfb71pgt03QCZQ6NoxYtrnPJbZXgEnX
67bGiUFo5itGL0U2uJC5xSena1GqKw13g+GMkCiUh4bDI2yDfOG5zmIN2I+h+aYQPevpNKR4vuvFxbKJ

V8jNJ857XI4zpl13RxqNWFIceazwpqc2MUQ4Kco1XIPkoUfpU3RVif2MUan8mhLugj1lLZgIRP0TQ39j

srmPRkQd7mJXaRM71C3oyyBPpVZzK/e87CaZirSOTV
TJA7y+yQKHILb87S1lB3dS9q4w/iNfforYFGST2snOi7oMzZ3FepXPooqeHfKSn894B21oobgHGRA0+V

UdloRIo5wzXo6QptVh23pnAiVib6zFaDCcCrlfCdFuN0p3Lj8u+kqg6MyIfulAmFhxTgEWQREfs3F5dj

q99Ez47R0Cah7ZlXqQ3CSO8Xn/aXQlNHmAxSb8/2ud
fwrOSuREUPwWo96jl7dN44y9/dyocnf5U7bEQdAaMW8GcKzG/mNNSaDenMiaU2vBbLLpI0u0gq7O9Fs1

fFWp8Q7uNSuIJYJDXygCf+KCVQhFlFms/spLU7igv8A09vrOkVZXL/qAscRv/CkGxeGQ7rjLWgARlTuo

kozlLcEug624mcnU//yYFOaNBbpqwvx/IfL4MPf896
qbTACe/5BrbiD7FsZeSb0/8mQ5THls61nKO+/BGrVtu0LTengkq/9H3HAbzRHGIkq15EceQ5b+j2n9s7

u/NnEC6Jab9ZTlO5i/ZzjJfg7JkWvJ426/Z8geiXXnXW/fransisco+izq/skawApVcPbnISQh/8gftBEj+av6

lWTIliwx8ZlcKy/TNqbOa9/2He1GKmA76OoiEOqvz2
Lr26p3vhchnK1zfyy166YWwsiY5cJAchA2pPB5XdZiy/2Hl8cBb2qYUH4gkuImWzbggMn1DAFBv1UytI

mM539WG/gKk2QfJIgHDSzfoxfPGHClHxZyF6I1n34KoM2BMIjwywqSmyzw/qw6TereRqAGVPywnwRHmd

e///upws/YI086zUR1l5VVp1dt2XeBZrDsa9AtWpyv
lQfhDHu57HnsRRGuAf63gVSOQWnSSgooN8DFAt7RPMC2x22ALDcIULy5MxXR2HOOZE9SsBs13nZljhoR

oifi7wpQOu/RNWM2JyaogbXvAtPTVBOdjgZXSvY+PBJ/jlLC4sHTWEsfQ7JIm+GoPesP/XcVOOJuYmxM

OeXxvbzjkq/XeBg9h5fScGD30zdCguSgYhRXvoL69b
4BDo0pWWzKceiUk7vUe/cO3z4ophEowvPpv0LZezSMnqF9F7x1DuyaDoQ3jQurVEyw4IcZ6qsizkFLhY

XZDOM5lNBk8P0p41uQs9MhRxIYqKVPCx62UdhrHX/AmBiVLVhvOTmNfngHm7TefBn0Pl41LldHMgx52e

zmAvEthWEuJO60fYcGdXxx0GG6Y48It202l27QI3yw
6+FTWiiLfl+EyDjXdMGNY5aLk5DtRAttlnw6VQe3LpOJp8MxZB/zBR0LUFLrLGqndxS8HXNspPX/afMn

OoTJq2H0QO4s0ghEmJw+w02wVVIwPvvyP7WFw5sz+OS+bupx0DJuwOE9Ux+RINA+El8ZkosxntqWePO8E

OYpWIEiB91shuIdsffrmDc0RHLrfn32EZB85YLjb6T
IsFwdWxxDE/DQ8x62s9hWVXwIYEfUIWkFwByBBEEe65y/cu4Im91irvVH65DALBSG+1Rco/faAKmMpuK

YkmyBBhn/1NcuzlE8ep/KCUCQo5xyH++bDxoxp4/1V0ReB+58UpTvje3TwDHecUrXgypVHS6euWFQ+yQ

BSfqwiDzCu4phhRaBfAq+et8aU3PIwcEAN4AsTG8Ge
gOhB752sRwuHTwOsyASk0mhhI74wl+6U9522CJJg5GVrU1XWsDHeELNvXtGduSueb/vWlU1JG5zh5DRB

FmmzeyB5TZhMgSHumMrQORw1vv3nnmsT1Lw08vBAR1b6xBXVqxMkiflFemP54kzdJGWRNTx4LVAxbCjf

PyM680H7lRVACiDG0fggwNWiuzj4/zHzU6bE5iXtZx
EnZTt1QYlaeVZWuBBULxsjhdgcu4iPzIfI8CPywzhnrcYcIGs+A3Iuroa+kf+f6JvdYx4KDFAZTAaOYZ

fWTCnEFS1z01ovwxqCarnURrUHezvbavXGQVmYb8aHYd3Dspa2Mj66EZdc0H2e5htzKRvEwFbYuqCQbg

3uAArYaROMCIXadGuGjH/s+3uzCcOrbXeAAzVubAis
+2IvqC/o617FE7NnAn9AtVY7sF0tszcBmvEeOkGGaY2RH05Lawv87LCniZKmi0Qi3Pop3WRKLonOgpIg

9iaqNtwJVZ+YWP5UgoY5OXEG0kZ5ZgZV/oPic2/UCXEGK9x8F+BhVkC9QkYgaxoPu+ia0xPj67ANI9qJ

RKSiAkkOFoUC9AGJFfjebzcRzYk5RXd6JJVtpR7hAF
qSgGNv+TBa//06G80YnMgQSutRMpPzD58KpVXnAmGt70sdnwVhIsUzlRoziVXb0qBKvoo8wN3SXHgX73

QFVsNb4vNhJHGN4S54ue/sLYg5+k6ZpijuKoE670ZHD8GHxWl44ghzlpEMROoPTLGVgg44yOR4bvGnld

6qm+YeU411ZnwbLXu5vUy9ah1cbbt72DCN8n2k7E0T
mH7D9R+Dvt7qN4QcT5MF2gLu3p5JLEJlrQksOVTJhVFJrgKQivQccwZ5AXVbY1PTc6NddRE138uRc1Ap

ra79wRGNdk26V0fQqs68Em2OflaD32MzOsA/j9ayXkPtztF2NxJ9QBBc9WXADDkGHSdkS9LTVmlr9lg+

FCBndUzARosUrd/YcR1bYIX3nTKLR1XznU3Xylf1Yh
vgV1qMK6kir4441fpl+wE1NCgSR0Qjke1/U0RYhfQCdt3fscFRUQaO7159bqFOiByeIVgpML5taSzC40

kVYosf9z01bnRjlXX4/X+bRSB8YvVjkvFqhPgzogQdPAYTEv5nR7q7Bo/hEKH4I9yQr5F0z2u/4zlv+L

WEwaK0O/yp6iXS7wRPuJFr5l6ijL/JtSojnDUg9GSJ
tj0ANETb71vP0r5bHN1HEWA2l0Du8Lqw1kHHjDeKLG8xE+nq/wHQgIJrBXhm3b5zvfawAjVdra3nD6lD

nTtWcw3tHGOYMu9audYyo/NjlKbMNUlcRwUQQ9p1lNi8K6wn+asxFSoj/JXWpQkHAl/L1sxMmI6oV/mY

iVBJibRdAhKNSub5gsS6LeO0lzgu0TaZuXNwADVaFT
jQ8cy/Pw883hxNcPtE7vD2/IUWZ2PdEh0MbM731+z7H5+A6AKtv1A0smmeTAGpekJfNcYNh2x7V3MPpo

ZS8/n/rn43L2IJVC3qqTjjotYfW8/LGG/1MDoxM3K6H+7+5+bZdD5a+Fu8DwYqnfV3HavlbgH8iaqbom

l/zzkLolP1+vHbO0C+Z+0fjAQq0H3d+ST/VodIb/Aa
4DE8ihgsb1qQadwpGMZ/usnK8HzqZiAL17/aB43R2lo6up0BAl6B3pJk8/nNPQRcPTZr/r0cDkcVGmuR

YUfpzV98R/HsdWXcTLdNe/PBbD86qAkFvEzdXJGzY1vXsn/oTPewMy2lvXcip4/yLASyk5fbb+8A/8NL

1dy5/m/vLJLSbrsNuyiF8ki4eKh0DNuzfji/Pjk9JS
PxWip4fQkwYkXtaRgp9L6IcoZ2MyyPXXl01f6puTIldflxk9HKDRkJwgxMv7gk0SLg8W7bAfV5FrI/RM

IL4p2j0Z2x8IAqCS/jNemN7xbRXi91KVskUr1YrF19WGIiH4o0wJpvH0aCTbreBH2pn+QLAm2mNVNd//

P9dN/gULR0Pul055eBLENo+87Kz0kZf0pN2MPr/8C7
/4dRIOj8va89YJB1nL9ChOx57e2+n+zl+37OMrl2vp5UdBq7qy9g5bD0f5s5wYKPNZ1N4fYvNy2e/xME

/Xc2T9+GhoqRQVYcY2cAO6P/YPoH0z+Y/sH0D6Z/EX4B5G1U/2D6B9M/zM7R0Q1RJC/rsmQd9BqymnaS

C3GjXFSl/74nrU9N4wsd7kyNRtuSuZ6jvHkLJuHvz2
mypB6U/tfpoej+bC6nxPQG6TwB2E1eNz5NJ+nT30GUN/gPKYdxpBlEmX0BnH2k2cDFgCi+YxVonIKAx5

o6l59qffn3dqeAFRS/PQK9PAfGUXlRohCbBqdQOG1JSOl/gkeE8Kx2GFMqVV67aIgwXjWn4DMUiJ3S/k

F5CIkO4BYi1iPo0XgtlBKm/gHz7ByQkOkNluyDDA8D
fYnzNsg4/FB7jB6WCYSeAkwtdpdvs5nkUWhL9gJ/vWUgJ0Fd/fW7GJZddTc6wcKf6tj1km9z2sN2vpYK

X8MmTBSytR6AeBIjscZPPmOx7bhVcvQMBlF4/h9COEOHrJNsz1F0M3xnY5utAQX5sbUsacbnpDXQ9wuh

mKnRA5dfSBJ1oiU5z1nWD9VG8zmiyuGMYQ+B5w2dVe
BUKsl8OBpYTZqih/QMYvQJh78kM0unwIjifR6vrzLqe3tcJWhGfqFEVt9EFcBoa04Xz7nUwCy/ACVM8v

jDit44pFEJRV2tgATTWVhXYYCdoWwvy25FoygTaoY78Htrp0BnWhBZn1cwHUOZyChbsFEmgipi6FntWY

6ICsRTjxZksIyTmNiGs7NV/fBp+97TB6O9ULoRgLqi
LD6dv4LicFsbRgYcn7+PsGEooe7iPXMVk574mLe+ljkoQcTK9rsujGimo5DMO29dN7dksC2tf0tKY8kL

qB9lwxaFk6ugyK6nDtl1zZfLReGmok7wVVuEkz5dIFp3wyO4675nUEuThEwkNiW4mA2DmpO05eosv6PQ

HPQkg7OpffzEOfwdZmhEAgJJcA1sEWZ26APF7w9CUy
o7yOmCLpSfq5Jnr05hP4WLD1rAVuvTDFlGZVlbfdzw5sGOPf62dFN5kt9tLgPJn8sssjiwwEz4G44eZx

V2BJUwUrfrvG52EH42yRmXVNrfKMplZTwkFx3F4981XgNczkqk4+rRAMkWtxzisSvpDOWVN66ZoNeISG

XB/gUAcehXUJ3cpZJlA3xFY/NDibMnzt70DFv3ArE1
5WOhF94bfhxgHWbh2RxHlY1oAOTePR3+nfoF4qj//Avz4SuSpAGA5EL2wpponUB8KpdqBL6Y/FXQJPRY

qG+hBnfv1YW6d71h4ZJRHEeRaSUPr+rCBC3yh/4g1uaL9WTEpBSRyz2X6UNNHB95A7sKQ3dV3Im9JpnO

DbuTLqrlpRqhHv36o8YVMEmO6YdvF4vGj6V+YS0/XZ
+Gp8LMxP1KZhBKIFsz5ZXl+Jdib2ay0bNBS8a9FVEauRBxT2pGdpeNMiY6vvoDkmuuMLwVUpGVXok0Cm

/qIYcaYcq3xgpT3zvW0M48jrLUXBleUtzhejm1oXKk0gPmBzjyUdNdRdtncIVPT+FofH+NJNnd1MAiDI

8b2ZkVW0Zr3czkmCUSyKjA+vvRx5I0YIRcQBg1A684
oerrS29TsIdGb7Blrc+kvqB60QL6+vlTI/8GNsLyhW/cPnOPHKO2RkxbUGk0J65JsimtiJItz8KHIz2i

qt4cxD+acBiTyLFScvVoFG9UuSoCIAOuJHq3DtxWDv2IOeW1ZZBWkIvrSms/Fj8WVqfJlJItMvj7/qWn

tq9dgFfEES+YmvSsTbPq8KhpN2aS9oktCPZGRiEAow
0QKKP8RpJP2YP5w5HNn+2mA7OqJRO3YBMHjExo9ilFtdhQRq9tIDCwwVXcwUxUfcomVyzT8tsT8pQWfN

ilU7BKVdEEiXeWgrSOnVRzGZ4MGaZr3xrV8mjU0xzu0kovz62UvNO9YBY0xB8a+fGA6mw1sbMMVTX+lA

DOJK5x6xcwKeG0+Q4zcA/V9A1/UNfHcoLjbGw9fRCJ
lLdveAcIry/hR5OxJLp/GpxK04Ga07PkrDh6MOYwr1vdSycrYT9DwtDRTHWItIDs48iMrZfDiMWn6+pC

bj1a04L+LmEGlbBDNdEB1rn4SkUnqSXqQ9RFWoyitZd9US/+YCV1jSVeNbs54rNKcRlYePeVotvCD3Ec

9iaQZNasL8LnfNAEjVVKH0/JUkfo0K4EJoEkOQ/R/T
u9ejZ4WHAvlRBS1fn4wuL36SZQwbRzD5LKeBdyxSho1uZO9K4+aubnLqqDHKhp0fQkvdc3qTc1mpiTnf

sJ68gzqGmto6oza8AkgJQS06w5iEiEuF8j8bx1Pv2XqAamIt3voeBknUgKDz6AEFNIpSjW08I1ug9d/S

rMCZzTonMDvWNLCtVpPNaUB8BOnlN1dVTI7vGhPrBH
uNJx+neMwfjLwAWb7Ov11QJdOK4ES3OEm2mEe0qk431TwqNIgrkFjO+K2jRxpv+sCs/iASuXu6JoiUrb

C9tSiT2lPn8IYGdXZ134Uhns1OA8zdrulz65AxXcnmRnoEjjkDm5UaVOEAFrBP9oN62tuZgTQ/AbJ8mM

q3mQdLoQ0H3ZS187d4INFztSTDlwjRh7jpcm7+c9Dj
WEK4PZAnPvnCM8Ky0BB7QA8e0yOlNVwEjvzf3hShUNYANrATbNW6TSBp5m0OYStgWgsWY+Dd3jpcAFf6

FP6/yuaSg82+t4v7+fzd80qXAGkM1UQmHQHhNuaI4Ic0XReK91kYeAD0QjxakFkomSPN7v+Vdu3Vo7Wg

ALjrIFXhYb6VYav+8SMry+185D1XiOB7lCABzy8z3D
laMvA6MNMfSbaTpvBmcsYhq+W7iIN2Xtj+k9VzE2t/CYw1jLmS0aE4bKGKMbPy6mwTQS5trF0Va3gS2g

jtKfGdQfijJRIPsijCeYHftFoiIA2R0ltFCyZh2i3Eg46THy+bs/KlQlUvrnXW+dBIXDuIdLuUdu+BxE

kezEPh4xIfVDeZrsdBxDnQUPGNjxa29HKQV6hi1wSR
gEYrDiXsZvVHZzgmcr+W2y1xCVogMK7Uk7NVD9UeYwisY9eP4yezoWwKbCms1vD31gRpnAkbhJefaIl3

N6pkpJ7SmOXBOgKofCc47jTQKFJrCbkX7XNsNnvJotpOAqmPItdc9Wvvq8ylEps9u6gMbvpSD9a3C1cP

YDHZ5d1srKBhHJ40jytBrUeIouo/aLR89YogNdV+B0
ZyaZXvrxf+fNxf+EKotL5M+lG3LfejM9NWwkvDsUc4HY986zlqr06nglgW7/3SwmC19FPjLpNZW9yJMk

DX6uPE4CiKDBX7QV8Cs8INvC6kXcGscQyyTtJR+XMt7kHpfakQ+43utei0JYHF0EIcxdMMX0LOhfn2fW

yxCClDDtE8vW7jxgvGUkCcL/Y7RhSw9uZhmsw9JKx9
uBq9byXmG8cfH6Owc4QchRpN1dfk3tRnNgTs0xAPKZYYG5mnkspJG+PghKh5NsSxbhDx2T0HMqVA7ypn

ersyLK3uB5pZgKRy7uOZ2frCAT60oVgWP4AYF8JjXebv7Ih6ttYM83YuYbFJewoQ+f2yAiq5bXE4kcaN

EXzi2Ag/f4qm1u1PTJc4cw2PY2H8v1AcHneDmJu0VR
Az8Q/zSL5uM1FXjoxLLTBw3UjvhkKfXAYWdy6PmeK+44rHkVbyJ0au1mvA0XK+VQu1W5MQW+utRa+049

Z7i0/cmNDUDWoegbuwk9+BqUS+EDJrvqbBvqntcC3fIuSRHz4lrgTHkw3MZ2S2Bi7G1zU33v+6HrVv71

+nTeGQLgTUZIXbmEHoZUGwXRWjtULppe4llN21/gFe
ZOxKA8hBkU94dJraZAx67aorY09qdPg2IKiac3RMgTyMgzVLnk2aWY3/HKe+BQ7WZHg5NBRjetHqNPe5

eNMvZi0Y5+3wKpj1ie2Lc303pf1WtFYxztlaNXFidV9TtPjlktJDvnay/uC1bDoyts3g5rGMzQoQ+TIC

sLARqC6JnljufNlaW5PM+YgXcWq0heji+3TXetsu7+
S+b2J2en2UgaW324pD+80lQCVvRH2gAFNiaTXwQBMQ8prncqumxJlU4xHwwzHfjD7+5wZGXsmV3tTnw1

9QnTegX81qQAivcVO/tH5UB+87myiDB1F+YY5j+q/kO1VMQ4t/4fqym0jpuwmfPa1dHqPUFBoUvW42lN

PIVD6ogYJp3yawbXCBbycesfwK9+phjnYXgGE3pv3q
hiCx0DWbrlenyHtrYhzMC2lBGgs6nMM5kBckmSWf6jAEgdfu06bLwUgiRymuqBk+9MWBgZ817/5VHh74

oDMMotMZHEPkktMrEa8ITY/Gi0WZ3r8fPVWGZ5PtXt8fpLXfpsS/sbTYrVTE9wwRwfWutcHvBh5lKU4J

CTudkzYnK51jHNgtwspixtxsCL1M3lY6OgTdr7NBc7
L9TUVJd578CHbCw9BNM/XidlRf3WRw8+LE8zuY6TdtVKkPOkTnxIeI2NNUVlXwiNZwo9L7JrodB75MPH

tZzD+LkePqNBpYfGPMKKiCszpYuGeJmdsVkDTRkHU8fAKg0LAO94006KMxAodeOBAit3prQVyGA+b4UC

4kLXP6VnsURPoWy0CpPw1PDcFA/Vx9BlQ/j3SIn225
+h0GqP1Oh1gn0pWIrP6EhIoKKgC77INFaHzk2lFsvnprchC6x97gNRZLIujKNqYg71EJwLkUl4v9LDc7

TT+MVOAChf02sajlL5WSGX/YlVs17t3QuC1/syVrM83x1ugAULU6giNsXSkd24FD59BaO8e8IGjPQvz9

r/NwSW+ECZcWvLNi4tn3B5tz7E3X2aVjftYUTA+6Wk
mKYWuWeYmjw64T/nRXoy/c80I63qRaQSRLthiROL1HybhJzH4IxdNQlbXeDwzY8A/hMmErjBkDETBMFM

v2Qr4cNbDzWfRdc6z/n4oP7VUu+k/TdZiKihwbh3Ej/fLSaAN1vSMmy482uaoQcCxsiqt3UCz9jmC2Qf

WzLXm/NATmbR908fAoTx7wzGFCVQbPrYk7zEL8PrEU
o0n5tKiXVanS6ACB8GuZrbK2CfRKF0KYkx77bD1LrAJehflc1+MBr9glRxVXidwSxLsIxXHSNQdko660

rDL8mEPz0GuUYvZKcHYTLckYPw+cgmCc+0VWjnmQxg0PnB77nL4ub1THg1ZgHFV9GsK/HybQy5OOJ2A8

mJKNKe/wJ9+0v6D1TvPk1/83WfGpYEwU/vIFV5s99N
m52m3YqGhMUDHnbH0fi0KqxEXLosN4I3rb8p1Vlx4vDmkuKZHfRCyZLOwj4Q0ESAHLDBCOhtsbkyakDs

JaSTHqNlPsVKvOfRHrYom3jIZVBAHCB7UwoVWcq9/YwUDfz2Nz7qzljztN+EwbKKQZU2EtPEZQfucNub

Z5bVtU7OV5y0wPl31RPj+8I0aGkJCpD55YfpDVR2yW
LAURA/sUgGrPGjcRAl9hfAzC9Gl0evPjuhPDXw85l4Kb2BJ0SD6awxAh9y7ETSq2iNiBPZgasqapZvuX+X

mEqGrR4pMSlslUtBNX3dn3heRuF2gf+z+tv0J0RIHJzrKkzSxvd6gKSYX2XJN0VhTScfc3XSWULDs0X4

anlq5RIromghAvXA7MZmy0eXN05pCyc2UpDhTLdrb2
o8s7crluvyBeJq2Phv/KKk4flSNxl7QgW/ZB93A6c1m+Gynq6cgQ/Dxk/peVneIC7fS2punrU4jV449+

LGrnBaviH8XuGCNtTVKw01gFFww/WhIPND1hLb4OxkVdPIHbunuu72v4E1wsZN7TG+NthxyZP7JplRXh

SjaH6dyD2HtQcKRQbdo7Bor5Q89zzL+ZxSbzNyGueR
INTOnHXPGLsqKCtHdW2x8GAdVVKMhG/t6e1RSaaPGB5WOktLrMrpTro4t7kot439OIj2vzNVP5xW8A58

lyR2xGSvLI4IYOx7PAYJEOtQELviKJuVwjhT2jJ7UpwaVM+islGE4E14e6eHpZumZ1eXj69HU9bRdCYE

M6wM0IOl5Pl+Gg+XaEKWqAttjpnNZndUhfGaG3f+rq
ZopxLdm/UrelmTAqLYJ3HksQ9iNkfFBJwkkdeKzRr4RBYur4l3vXVgAd75SwpR4eqh/c9hgx7V7txBwh

g+g51Lr7LzXECh0wTcrZxfm1ZY05Y1OaSrz/7GMCAx7CfFm8Cvqw66v7sUMFwJnC5wnGdO9WsJi6IdtO

qLPt9Qn46neYXbDj+zzRbFzHnEldm6uvBZrHaFImlp
c71GjMTMhSYrAd/HpHjkff6q2+dkI6oehFJ5vJOZEq4oYeCI9JStGRuD8D4p0DKalne/K32mlT7dY/7c

X6VRZCB2a3Xy92VmPjsM7EdpX/j7O88aUEwOgV9xgqlGOb9532xq/dfVGe8EcwexKE/tbYegfw+nwdnm

ASMaD/qbouERGYMMOxFwNQoFuOGH4WpT4dNO0jauTw
Wmj5PeDTZsTHorUlTauSC0Gga+o/a9WDDxgXMEERJOs7E6zE+S5ozKt+o+X13mxaLH8Vkc6L867Ov07L

egUyrTdn/nf20kxa7ObH/GNb97VaQPiXqRGi+6ex33hX6pEqAJTxNKkLXDOf6ZKXeeVwc0SmAqldKpTL

rLbQHiAO9gEd0yBkZ6mUdgcRSJ9u8saCws+5/BGDGC
Bssqwql/ea/eKtpMsgo6gV8sMb1b4OukPft1csMiojY0dUstNzHf7qgx/jvE1mpzvOHllndUnUJaPJL8

J7ZTIetbaE3Bc8xnJ9RU22C7nGg34/9Ne+ww171CB/INfTIgPV7xazMciVwjRLMIaIgIi0naunm+HR1L

KQCpmOoWKySZm8SNveBXMQs0UtGUT3dU1+mp7PR/GY
Ak4Ki6q3mm3bXGs5xuLaY3hgxsIpBBpN8lyiixTfKYgrArikqNjbbsppEvgH7PQQQW8Hb1gVjShcSl1T

/+ics0t9kWBzlHnFp81NNldfXODQLC1P9xyXUMcHqBeK93xJiomoOs6EIeAOuMyVOjTlxdm3YQWGzq+V

H2qwXOt0kXSobIxbimcoFlDVujmHMfAHrJigsIoJ4T
bQjz2wMqSN8MSvjbrx5Y47zG/nbf7whLSOA7lFxR22WjzPzSdwlON1dabMFmo7auvKsWSD+XMUbPMNmq

JXNMYPKZMPT0O+v94oKhILaUVEhu2cJIMN9ESHvBl4i4rf8OTonOytqHO0O1L+laTxajjhXILguxCMTc

xRH/d0wJHibyAJHeZCbsecDq59z/mdY+18sc5FdYUq
FWplxBC9mEfXdMn1xCbJRLavF7+zeJHzuzJ6l5ZbFLDWD/Bfyk8DUHvAvqQriNsx2/JRQbM40GLTomSv

j7bBKiZ290Ys1kSw6zeRMpRLb6hAYPiYvzM/zKV3KUsb+K46MpgLsKcKQKQ/fpNOfHNKL86qnYn1Hkyo

p+ijGlF3mCxbyJH1N+OFthKsL9uko/XRlmkBoirGzk
r4DsCYLX2y+KnhEP/4F2VH8jjInru3ZpBdmxbHrilSIk9L/hG5Y0bdz+4pkxBSr/j24hglGmqAyV1qRk

ob950Tywza/lt89OtG0JCbWmsI4vEHkBxb/3E5go5tQUdn3t3vHugI7kMqcH6JQPQj+KqHSYTE9rcCR4

Tpwmkt+dIeEDTdU9/q24IFTIsx1b9wp+7hJ4SyRR/V
srnYNDSBDDyxpNnSaJRM+QH06xKkEoOcyo/L25voFnHe0fY7c5W6bG6ZBDrao4ZfWj0sEagg6v6vKgML

1rPVHW3n+cjXCwr9lOalaoSrJpP13NwSvwc/flvpKyroBmTahNV5/MEbXrFgu++lFX1LUXjxGti9B9hD

Vy/OjnX0y1dAYSoBVEMxy6LirxIgJiGKhA5QcsMfPm
sCPU3hJHHOxVDDHdhuhGNGI8ZESOG7CfY7xEjwosaWPJoofr3y5eoS7qkUibBLFB4gf/KL6BAiIGf365

+QJWX4rkMuGi7M1o0+0Jo3jcrnhh74tpZAUrposD/DGQgWwBOuHQgmf3N6c4wkiMCE00/lpd3RarnswI

HMJenjsVqAeHy4Enr5vQlokBmT3LZDIYjlxG1q0oP/
kO1zLGSyGm0wBq93Ipc76nhGIKR2MG3aOQE33p3BjB7DD44pHtJgJ11Mat5ApWs2PYYzM2tTP0ftnjMH

ks37n9kupsIMhiJFcVugi4KgfptowVn/JnZp5Px4Ya5jB7gB8hcEVeAAI0e0CIb6OYXnAuevPNOD0Sru

8vawhF5V9rU2pZOuG7CEmjcuGReeLvjmwWfg9Uf+Chi
JUOgnzp/ZbOvMHhhli0DAAQLOvRM/vDVX62H/5LPhftYmUVh4ep5g7FqaoZ4/8690qI3C0AZPpiqThgO

hOlHOa7J7h07Ru9rypquH5g3dE/Ge5c00ukNW5uFURpSrf+vjxMFMT+hxym4upjv2+3DtAiMdkbadMTw

w7guYBDzMB5U3G/rQ9EawPSfEhAhrXT82hiGSQU6hh
hV5x7T4VYSr0PbCNJTBLDqo3i4jJLGkJbJhBjtdcvWRjPc2dTXpQSdUW/aLlxLaoXPwIFJS8rIMWUhh4

Q/pYIJoP3F5qPIFS+GwwIKFfUbUfiIt+kx2cRNABOOji4bvP4NIZ+77lJIxGNUCdqwGiw1mUdZQDDtFJ

LQZiGTZGD3FxMH/0wRGlk8x9kDAtRBMTD1zvCLNeiN
r38eK1pnayFIGYQVt7pzk3uoIiXxsS4cAx2uliKCiAuk5xUHFXkIFchs/o3bQfrSzKyYErttUAaZ6Q6i

cUPbKyd1FTfRpnIxWVc4GtldX3R3+uZwOMyF1lM/Z83Q4mLe2ibt87kgEzB9NAqRf4KqdANm6qkR0w9E

7XSPYoQink31Sdq3R8/VcsjaKiZJcNEvGQeADOXObW
QvMaOA3m1FCOEEq6EgMtMdlA3OyMQMmiyxUzJF8yL7lmEdlgquges/ptwxEdclQZCEITHUIlNtCLsXuW

gw2oKFvhnl19iRb/+FvbI+kBJHgkq6uDWm3/5TXxP3dBAGAkVlPbAXg+y/x/R5MNIM7RSqn/z7Dqp+P5

e/9HCiUkVRVNhyu3TsboGO/9TU50an41EDLRK2i5gW
pExTXT+Vq1hzq8z40eqDGDGwrZ1k+Re9+mLwx5HAkJHkCCzOPdd3mRXjbp0ER9h2j4erLc85HXutyF51

nz2QnzpsVOSwHZ/yRu2uujGegIVo8AB7W7f8+dMuQX0V6kLhKSUxfkX1MEtijWloVmqr9Sp8ryF8MRrD

Sm1SRnC/3+4CJ2e1Y8hHKub6Wb3eI2BOA4/npy7Is+
3CZ7tUqRxtNtPcXBK6Z/kqjM6AJuLSMHSp8fxn/F/Q2BPr94GL7fxitTWyWmOfczylI9aWWFIvID8BFa

jRWYtKMbmvY2TQmhx3Q9IQ6L+ZhEupgLeR8rLHzOrdnR9BsYa5Lx6ijiUx1nQA/0NDuDLsCuJ1b9E3xm

o3ge1q1aso6gFhx/Ez61Yno9z6ShnGo3/zAyHG7oiu
MAPJjKMtMc7osPm0598ScZZ8MfElfyT+yQ2bfA5SelOZKZqgjpmNOUGPh10hpNLFFgJ3ljYtSygzrX3z

lrK4egxhAAqObRKsvhoCh6YR56GAfz5DtAeiDhKI10jKrr9T0k5icZj8KvStcQeD1HH0hMWGECbVQ3AD

bIlLUlxe6A7x8lVW2ONe+wM0eZUM5qy5He0MdaF/eq
QBVv7g7dXhqUw8UG79J1Co1bSs3hybi/aB5TATd0NAStmVL+qI/E9P0VakHquD0PAprZtfeH/sIvxZ79

Q4NzyYsApLVuLcSazzlb+FWeydG5SepPR7k5gV/oFsZ0cMmRLz9c9q1NIw4CgBlgkrpC+FQK5NSmZcb8

21Ck8lpZgVGk96hn7IOuhmqWLtJ7V8fYQ6h9a1mn/k
R6FPagnATyEk98F3Pl0XLp8ybdq/2xkLy+RoveXJaUAV+qAgQfw+RM9Wxop+aIawkxOkXWh/H2fmCP0/

jvds/SqI4S3FESVxgdWuYdXK5zqCXT9Wphve9lQRSTW98NLkGEX0MscjhgLgSh6k8N33Ew+RExxw4Jb7

yveFqrCSSILZ7Y1sXfjVxnfSbysr2LeMXSVHaxMrzh
uFLOux/+dbAQY/TkN3TNgwFUTdsWLkB0BnPKeHs5faJxV/dnJlbiMCwknym8EsiAoVXzm/QrWlMk4rrm

7J8SNkA9mAstI+NndZr/9FljmdDBfspNx4GZwwQM/D+wLWttBr7mEnXfzfc3ose4M8t6G9eCeBP1lUKW

TkJsjbxzil03wX+p0qMzMc1sm5KnavQklurmbMvobv
zzlE1jVWyU/L/3BostCoxa+tCPZA15F9GQw9yQ0VQmKNfx4+5+v90yJoq/98frxj5X/Go13XJfZlz9ti

q/DSyvusAMmJAWaGeenwhX22P7N0ABk+8JZlPi7UqEazoXAmMg4e3np+TGHDF0QU+HZg90MnOpHIRAki

Sx37NrD7eRCcwoHDV9BR74Tuf6yCJTl4eleKdK3UD7
kuRMchlpoNkULaL8gC4/Qu9bYSjxk+bwb/VLuzStHZWEdlG1g3eKBH07SHJ/jnQJIPsMF2PQknpFnJg6

hS+7KFY4CxnSQCRiMnLrcN97cWe67zERqlV436Y7R+ufBSe5Z2Xp9wmQu3NU5gpezylUlMHzDmiF4MYZ

4Y/NZym5X6/Lxn6YFohxXzAfoH0vFVWyr+Mbm4fvhA
DR1DxE3wK9+0c1Vj7uYHkWn86mA83wqC1n1u9pvFeV2s+UIOl+uei0Hpnb5rip7RjHC/yubK25uTOPz6

iONh2+5XssbioqL946p2jTnWvdMQAl+xoPVlejevaHcU8MUIzB+Vx4zGbp+lO0wVC/bEWlpZc7Ic4JiR

IrKyZciRVchxfjKL7R0y+b8IalobyMd5ExQQ/DmyrK
Jj/7zqTwrlH1Csid+1rXQnyQ4XmOk3jN28r0iKxOjbpMRH2DZxwRh/HZBd0UTNWNC5ykZKmns5vatx4m

X0VKi9hmeex0Klze++s6ywgCwFKIcXvNllZa/h9iz7admqPJB5tICZv9JCJV8T/ewmAK4mAs5KC1Np9Q

eqM2vBtTFDWwn5JIURY0BID105avKa+Al9Ij0Q7R/C
4wmyVa5zECoIsY8pXPeMPHxMT8so1mftRL3kzeiqUUzUWu15nrtjxmM+4jqn0gOlHW9rfwlsquQLBFEm

R6B/ZSbaY9n1iIWIY7Ca9tWjkTKHlHx4w0Uv1DTx5IPjecRNGVhXbffni1MvCSWUFI5ImCfwbJdNjM9Z

aXJpeLsZtULUQwdH5pK+u7UUyWvSrNxW5a5Z5ZgXfD
vJO8DTqVOkBNeGeG6gCLX6/n/t+DZUyGSl5VmDAWVd/1PAkQhh/cVDwYf7abJIUqR1Filsdz3+YOnrW0

6Dw3kCnRmnXgFklJofg4GJ6wMLEXq00dDklmzXuWWy+eqhO+8aVIgtRZ21mgaIaW6CP1927T24fbIY0c

fecNucQdfYtjfVISZT2SCxouPVH3OPqfdiWlr7zu1p
oo3DmSPLL0W+aBldcmKLp1hUJdvbbjYLX5YodGPdGEB4jPtubYcyxlYTucWQV4jLamrbuPNBHRie509P

pWQL/9y+3OezHWrsX0FaCRON4GBmpik7HboBWbhkqEMi2PvII2/C59w3orPEZSJfM9ZjJXSFVtI7hQ1X

5XtR/7mUpteKn9I5kRMDvUY69SBhAz46NM3fjrAG1Q
qGH6yE/y0a16/PoRWKBQcHn1K9/zOvbNnmubLBU2CI+81NwlQ5E/FMcLg1srkscs0fM4qgKnca+XFW8W

Vnsf/nHE6DoAvQJ75zxwdQs672kfOhW0NbOb2ZAjfPa6bAWmfkb9juqJ08iQIMqP1CkXaddbqPj/s9hG

uQa08hVYo0ZewoUiBU+nw+WrwL3kj9cxUnC1D5ICyc
5g4SR15axSZYiXJwecCpHF16Xog25IxsaZ+msLSXZmYManJlhSmnWcCsv/gy8wUmYSyp/ckYQjlgX9nE

lMPr1lLcbRLmakLVmA7+CN3GiiHCPEU4EFSmWEFdd5D3uIWDQdwaw/53/ZrJSyfHSrfMhuY1l5RmJxg6

X0tnr1496XjzxheKhqaEm426DbmrrHnXYGS0FXwJ9G
bLPfMmybx1voshH4DTK+zQo6BQB2nU/J/bkKujdtURhsFVkh84LMzAdCTfyN5vq/ib3Pd6LwfK6OsG+p

W7BpkiUC2eXmsUS5ouE8yfeIfoQP/IeN/OVEj4VIhacKoSHf3UDspCNdGCx/zATfI+85iiZKSs+XaqfC

rwfWrM8q7ggm6/5KKHJds1J5PWOqolZN71mUm+s/Uo
dbqDjKU990K5o9IZYqK5O2Wlkxev3kcd0WJQBVOwL4LiVdWEbN4vadQHM38w7uNDQkZlmyUiukf9m0ti

MmhR/Q+42Uq8MaPCV2CvjfyjCSLe4TMyl4rrnyqCFHUm1tG130TuSLrnxDftGeeJk7hUZMm36tYHQp1j

RysQd+wbHBdjXJZkMX9DEf4XRzRzePlgIkQ3xMSHYf
8G7jlYCZyk17Lrxy3o31zqzg/faGPBEuvkJLVO3JfGvuZdS3sNtk4uj3n9JlzGRP9a58gqnJWiiF1kKU

n/92so2F/P3Rl5vjv6fnqPnCJHZp8htHsvqA5S1hE8AdOpw4smqnlAeDkC+V2Tbi93UugYtnMPr5ba5w

CmvnXuyJ0Wh+2DfFx292HNJkT1GhryLOT9ZD/A5TFJ
BfSfGQXcBsWX6glmtMjs4jsHKITcHpzusGdFQIFPGkDJo2p2nyIVsnZYU5KbMVQXbLeGNTV0rslzaUuc

NVtxQC5/PNCyfK31nm2pP2vVfUWjep0mVnosdaeEZK9bnJvj1nYKRb2MvTof+sHqlZ4HdB0fly1+Tvq4

YCAd24gJMmk5hmnLjoaVMlz8+j4SXGyYZ2RrOxEuQF
U+26xlx+zvqLlTaR3iXv/Lk2p/xatLxhivNtF4zQXV8veF+ow5kWDPThPOGCRvzau7aWSln0syHIMxY0

dMztt3kccv2X6xKzkK6hv8ABRGnVnYCWvuebuFcI0bAFGQ7eOdEGyvxhLsgEcMza7pdmc4h5F+Jt6oB5

xowAjQ0F3+IxuvXh/LOLx+yBVEMmhUaYYm8M2zO2hv
gJQpquMTggOYRx5lUvxLojgudNearNco6HNqjlRf1vOEjukftk8IbqGWskWR6fkQlJrfXyNZCflAS9+p

akUX4L779woi9++1vkK56roTSQe3RVHHQxUD9kksYmfhVyjF6BD00wbuz3fZ2ctS6Mo0E1IpRxPNmAEw

LGG7bSxuq9XDu/ZeI2QO6HYT9Rmz4a5tLfiu01L7lP
JQkY/kII2JJJOLy6Q7ecZjpV7D/yq89ZEo22c9JLlPnqKjOF/HxE9h/KuvNp1wwk6eQ/DWkCnakitEXk

s/q6zibQ+5oOpliJYe0krI4quZyBkRE2HzIgFz1m+urP9evhuA3A2JQSFRIFRW/fMxJsh8Iab6+rCMIC

Yqsifg0PN7T67aovNhA/mxpHC930b7sOOOBRE4839L
EfAm9y1UWCfCmzI4l755iR1F7aHskCNYUFLCgRhdDzz1fKaeCrOvVolOrmiMqdLvX5KUpXJ5N7Xeu6D7

i/Kmo2S+iG+ohbdrvgCv8Ki17syeiSfq0T/Yx/2XQ8W1BgiuNzxfv89BDnI7FdRh4THfzDl099OnhYSK

ZarPLX79HlWj5wqmbbFEa8bs1R/UKhrkBFaArD3LHK
vBS/39NZTSSK+rLRuJsVicKPnC59JQyrBrrSIlnHHjQItLn8XxuL0p68xbU6LmkbJZ5MtnWYx9+Vfbi7

mXHDjFG7G+qGsJNLSl3Xjdfz7pOdt01Eunncxq2vi3fwc623vOoU4JBHcm+2m6EL77g9pcpEZ7X3m9bB

8hNBjBCOyW+gCHrxEpKxo507LLdziXRuZ2LKV710n6
X05knPqh5kq0IxBp/+QIsrO8m14zOWpEzBx5C3WyYcIk5te+0vby3kBnUj4ajMMFssT3tjtp6AvZluCr

U8h6H3R5fjs104thVAnYcejYuQiUyxz02wq4WweJo2cVOPgcMhVmhS8c0mvYp5h8U/pvvTETWOqjKDyM

YMFXvgrojRLFUSzfSk9ctjnftHo1oRPmXH/chHsgqG
Y937ndqjZYdSgfpwe6qCTPtjaobhgfLlZWlv+CH9PbJoDNjYww5l70rxm+acUsvWTxknLeYi1DYSpD+D

F25qYdUz+zzs6ETGohLu+kKmTUFafYgnfQKY36oW5k65VpPhPlHBF90wraGUZUMgE071hN912Yl1TA7y

bg3XEcOjC+UNfgDEo0+0bvUgnWdOpyydI/J/asChAS
m5bS9SEPDIunS3cXtb8nnGJMRn8hHMy6BRuo2aCae/syNlvbgvWG9XtkhEuh0sjiILFyvVfqFhof1k7b

MqfEaBkabJDynP08tpvQ2CsqzOZgL9PdHSy4/ObAOxN8AbJzG9+jas3zHUvL/br7k9QJ1RfWSMBh7eWv

VPk7NVXDVMh8YSqabwedOc4JRnJ/ZZ0dUIbpgD+KXa
RcTL1YOCvJ5xSsNdsU83vMglVK8TzG+I/o/Bxi2RkX5A3eqr8tfEB3wTAIfCxwwqmE5V0ne4rnAg3sYt

cY44R2EYmTlryaerXiCO4QSRbFIF8Ew4PT5yQtDpsK/VyHAlI6k/ifAiG2msA0IDyr0UjO3uU4RTLXjK

MyHb0jiIzMW+2dl/mm5KeErN+FnBnqeLbxhbJdskRO
p/6PT5eqXAWvENOKf7mGRUw8ij87wU5BtVj7e8DqQb+vyDy1ayuG5KOozx9xorbDc6zKjSE8c9HpUwm6

AzwANUs0njD1YPJKQaCVjiClOJ6VVcvED8/bvhVT2aVdxD/6UdW1u/G4csWv01wEOAE4X1TA1bfXlXt0

jTUa3o4F3H0OJoOC4OGkGUxLMIv99OfqmasuOD1Sk9
4y8eibBt4NHx8aDz/HQfCdi8oQHXeo25VjjRACOB1Ls1vq0niNxvnoh8xlCsylTKrelmfx3vsfetjPzL

hQcnl5+yH/QksnrPBxdmbyuz63hcRJkYEjZuZB4rl3D2kUdRpOzQAIGQptmp6PBEUYGyJi931UA+dXlD

x1KGPnwcKwItW227XLBa6vfRhmTFGK4cmcdpG1CsTE
Buc4xOLNPpvJMgBfynjCEjoUNyIcEjsuNo0oR8qxAOTnRcYD+W9As92YlOhMWs0g8LMWSKGiqPHNyXbb

oNtQbAaMijm9pgQeXc+8vt1SVgbVDofmQX62DWJ9Lt1CiFkU74ue7nffXUQ+mmMTwNERzaCWWAZ/HlBl

cYHdAYVPGuhHo3/qA1vRrJ6JRaVJijU9z7fo/25H8g
vZy8960desRNTZf7D/79F0rr0H/MZtRQDDrKr3At8Wj//67hB7sb+5zy48awK2MZ/ws0i+vEr4eoZ/DF

ofgiSv4d+eZ/LjP62aecuL92aYEIwf5ag3CZDu7ZT2nqXKvoh5lW6XlO/+PU3N3BaLuaB2z0BHFwy164

NcP+H8aXT76/IPGP8BS/lP/PVfO+zYvVohsMUphr3K
ouMq6RGXmd6FOrPndOlN9nJZBt7JbQ51pTA6wFVaZ3Q3FJ1mg4T+b0RvpLz+4AQKJ2QjhCFE9cizQM7R

OCTAVIA+CBq29aRr4yIqysQOPSBpaaa3MWwB0xZG396P6QhcdORSDi28MxOtDfo3SA/U6o4cJNbEAKsVqxvd

8Rl1es4oaJkkVkY9lPqzldZiLF54NeBNekKuZJbq3g
rZ5Jocwd6o5yzFJQOgDcXmohiPFPyGio0kaScjDGE/njb9VjuKTUbeZYeDBCLBHjawlw6T+3vymGZHZg

2MAfVkkgViKA8cn4D6uDh9+BZddiQlnD/xqkb6/L3EmVis7xCQC/GkH+dvr3Omjj4uSKjp8S4g9TmQqW

Ry3mJlZu4mQhq3eaW1MXQCjHH3ixZL8lOkoM/s45A5
taLGTX2/Aik9IKh7nWceYGGEjMnemil4ERlCGG6X/OiAtbU4WA3p0ZPKQRptpPrMbYRX9tbUcn2L+4iV

hyjROYpIV7NeaX6suW7gcRNvbQBKETanD2IraFBYiWU/XXoOk1jpUxxYvBojCEDk6qV5EchoFSJ8sSii

Sfh+Bp0TxfjHO4rf3gaazsPkiIlqoxB97hYsrPF/Qj
VjfFExqW6QYa0ktvRPs1NpBxOi62Kit+Tqs73+082irEO7ePh9aweE1kTmBfBl6GgQz3z0gK8DFiuneh

XtIEnXLfZxKkhsGCsSqBhEk9FyHmOMCw9BQbamPc8+YpPT7OzVkhySBrSJKcWTq4UCNP/hIsEOOliATc

RXbEUSg2Ae9bGYweke1rYSO4EGHn+mbkwJrAZT/2gP
+7xIqPEyjjR5jYxVUYE1b4vBUAh+ofbTyDWdSiSZ/CE2aWZmGHSjH7X0Hb1/BMCL48Nv8S891/aqP9ku

id4mrriOtvlF8ORS8Vxd3GOG5F9stTT/WFS/u6x9KBbzTmzIga7pOqFHowvE4kDF5V7NsgkLf8pqSO0Y

Xr3PGk5obkNTh5N0WUuFfEAn6wTSP6u60CCfTuv+Ob
T/V+xgMIltE35BwO5jFCcn/VxYzrWcdS/6HH9B+axMh0M0blqDvn05dPN5olh4A60lDqFFGLKPKdMM8t

9NXUL4PL67zdu5HAWt6bY86FtUsjrwpV9orCSRgCKxlnhUvmC1zN0mkILyBAydo8O8Koz5Ijqh+DjceL

63WfCsz69SJfvWZkEwZmsaQmaiO50NgQDfaE07T68H
ZWBSeV5CMX5Ppf4Q+2dEtW4uEB1MJU8M63c8LpvbRQoLCDDVtiQiJAOrZOGHjp/lBBxiq/uchWwBtl3C

kFDVOdPPzTOTdo2Sll3HQn1pCLgUChpuhWLyz/a+LD/7tm8jqwwXB7MBVDUZ/J2V7OUi5kRuln0GyrZL

afgHmldV7/t4DCVy6HCOjzIaZrcbYscUOmQWI8t76+
AVYJqffPhxjRRFM2FvzDQSGMJUydKBIeTXN2FmFJwweW9hwQbuclcLdrojWlX5FFNv9VTFJLusvnre3T

ihSUH4cA3Z3G/6GfKK40B9GY/xvtu/3vtG/FDnhZlUw7GerB6UvCwBvivWF8NOLEbmfI/1KVzXWcUATY

4xTJ9EWk8a4Ns7HnJAhEb1j5AKCFxDVDUj3I8X/XJ1
82S9cqbbo50GijYBGQ9zlfVY5ojhn3XWyRliV7OybiTcmFXbb4NScAVBgULRiHwa5KamZ8ttVyyuXWLI

UebjknZAMTkZLxQg4Huls7wLUXPtcrHXzQzr+Y4Z1Fz1OkgwKRSQC0KUNFRK5+4ANxl4uxTJxOa4IB8g

WkBt/5ZjPgy1FX0Qygim8cvem/AfeiITFGkvK3ggZZ
Grms2AQvdrEE0qS6UQF6AX7UOxcdv06TnAVYokHSUPtizOO1K3YtArgU4RRhswY0nZRG5bvkxeK8VwT9

a6a37u6HVQie2NBRv1vnmWiy22z1YAxrFXTqJnfjqb9vbsdcjENqpSufVe7NFsPHfRySgUK/Et9UdulG

dKQX2sU4auYt0epT6rhuv5pfS1Dzt5LQnLbcxebMVC
8AEi8l70QVrOWAJQkLg0EMOiDKawEbTxPBG++wKQsf7KHLr+sE+mCe6g4ZzQZ1F4M6ec+P46/4vtfVC/

gnrE7qfyYme3nsxqreOHOBvpLmPPf4rNA5rO5RANQ3jXu7P0vFRLjleQjyUo93u6xTVLJFL+eqkMuo6D

Ar/JKp3RXi6BDK14auSRnoe4omcxA9uGAIMaxgWZMz
2e0wfFeezIUun572q/nNbeTzhjzWh69Ol2+F6PV+ayqKX0PHiNapmM97KSYi7jd+TrhQ/NHpM7r+HSvV

k4QIcoWQwZ78OB6mSV7Nch9iPNXWI/sOZG1UzD+MOn4JWm8DIqbYIdRws51fMcKwgW78yEoWb0d+BYfV

s15WaTSz1//593Kghq5X7xOpity5Q/uwwsBJ8TCyLL
1lTPs1tPgorlgozxYgX2oywAIoXxcRsu1BFa4z4OjdB2FGK84RQ5l2orNa1qNHAe1Loa4kP1/UayNFZk

tvJCBU1SepzXM6ihPLVxEk8K0YjHH0aA8GLHnLoxUjytbDyhRsGsWZBQ0x847mxl87gPJTFNPRPV3Lc1

On7FO6qwVtC0/1PIOITIkwMnLPxNmDnBPBN9R0eoNA
hCaPrFiXjm01Icli0sf0jGK75eKoqNW1Ys4wY74vBubrE5qrC/a1klSGOZHF53EwL1WHQ3XMM6DlF9Vq

Iuj4D51NHnT34kIBsziTK0bs8zk6RIGZLlpFamaF7wSc/e38wKllb+Xker6QMR13lLqVVNGeFCgdtGoo

P7Z3Ymcz2IFtW13y6vzAS49jtYoZJI2drxGXNvF7vi
/G68enElhDyw+CpX5Ez3Dc31d0dCDvIpP0BH2o9WPghQY8MRkxDH+QjwGFHjbNQlfps8VLP5AS2OtIVn

dyobLg8lUKJxJuc5IH+AB5i03nx54bcvthYdjGIXpnphIWbjT+HvJ/dAwD8qVBZTHqBkdR2ng1njEfeP

dGhMJW0tctgnz+EeOmIKAmMP6E+fGhJgNuabC8+rSt
XThmSEUAm53nkxoXuEhZ+DH/ZDz4WboNo7kjHmeoKvbMETi9ym/IDB5n9HHx1hdMIeEqELvSJ8VLFLsH

s3AlKCq/mGn+9xS8E/VUwAraT8YLwqyi+fZOl9VXea45eFogYtErwX0JcU/TMJgldrlR7kFeouVu1eyc

NBfKEatK9SWa4h/P/kNsIs3Md4hssXL5jqNdRH5xca
9kpX4OVNUx+diLrcVLjvhwNdEniyQivuRh2TomCs+UMWQhbyYgNZsK9neUH5BVo8ccMSdOP9F2x2spTe

9g9k6fe5OmxXOTguz/0Z7NDV/QqA6RXwKYTcqCHOqJqcPE1zcIP3GataPRRrk8Eg5siqgAV0+/G25ut9

8zYh5ysixPttBpyJvn9bIeL95eLQreFehpnroaHf4G
v4y+5aDAwG5teJNmwZIMMzeKEPC8KF3IG8yUogqLmZ0M4NsVfzNOt0JfFPkxugQ+nSpLI8lugz5ZV0lU

n5mUbTDa4ZnztpJV6y7xGr/7Y/sq1vb82x/gLVehGpicPA1Yur1BxWyHVsWvlKaoGEqNNWhRh53Fj3M/

hACa4McYtnI6uv0rVrD01bjooiyy7N2wMUiuUXB2c/
cWRbzN+QhnWAA/5+fCucqbWpU6CBO1fhYf0MkSnyBVTZ9ryeOPDAWCSqfBYEb1rxnU+gbC0lc2fMoi/m

C18HTN4227zSVSA7XvTRJu7E59/fxALlD8+I5lWp3bTJLqQToU+KFzSg7DAqwlojU6ucjdznwpPKgt7F

M7iY2jufikQF1WfFGPcEeMfYdSY9TDy7SOpoLdLD+1
ewQAGYnZiK3f8C2TCna0/BouazY0maDX4vtPd8zXkQN6tHY+oDT7zf2Ia43X6KJbAedK7gOoP2bGgfc9

8OXcWB9cvAckOiDkMFOlLdCudtVLOaSXqgCjuVHTpxNWxoJdJ3YjSX14Unlfz3CdEacGCUpwxvADkF5K

5DpN4f0pgcvL0XsZYoCplbqKJtdth5XJhf4g1C4ex7
/4gmaj7dQL/uwAQbTD0fdOUzNGPf3/SbL7Q1lW2UIIpCnlneObeU9ttI9/1y6zA3SzSxWJmiudTePtXd

pdPeUkmAoQqRoC7xcH/iE1I9h1NDy8rUy7i1os4Utt3am2wQMJQYiFVHdsOGc7+6bltFYIwVfKZI9wlD

oDTHrVzPVdEmLze/sX28v6gaEADIOoFt3o4yNXlbJ2
l5NtpHGHBZ560kI8m68th5sLUiV9rWg6f833l6/g2tg4bWNMEcx+NjRLfIBiMBQiqo9+glzlel5T21JU

t+jdgwcpMu6+tSiBRYwJMv9C+BNJw8GE0l0M+EqJ/onl7FFt2iqcVzaX7GPUmKxXs4krcfc5Tml8QGJK

PM0swH8ezRPrL4J+qFl0XtNV3BPq5JYNBArfHqGKB/
lYo39yYKTqahtha8jP1NoiFm3QvlgrDZeOzkNEn8BZN6tHcxVS/Fcdr+5JifUzv4MJvvx2yYtUUE35XM

1QDujJp96ffvp7rAUthFJQXt8V27puUduCdty7o5zgTIWZSuar6LthhhlEpKVu5CGUYD+yW1nhhI5unW

2jd0n6QTTdFnSGSxzfq542lfyF+qFFa7/1iey/dGfL
YibArOONZArIaFm6WuvL0K3GsVHJv447/ox3i4Kd7KGDVSwbZeecomb1z5pfLjac4XnfBXmdvRqrhIv/

l2+uoHhq41hGzsH8pt2MFsyxFk7bJyia6/4s69OYXu44FQb5IHrnuJXyo7+aknDwcwazZfiN70DyaUok

WAMLk0Kox0XWXbCBMZ1AE4jUkyQ6XKd6enGAWBAIfs
4FNrc2rqx0KtIyT0qG1YROIvE7lRwDrlw7AM6XqdB+nzdMdw1qv2hfZMTFZ1cqRHSoIxm3JmYz6V9EeW

o2dvEAeqCuJ2aU1xOKoMYq25ybUeecsPzA35K4Qr2mb7ypP3uekNLmdR9EW0kae+SPWimCjXwyBE+A1u

bDpkl1/TeYy9YiRLkUIyjH9XWVtDd6B30V/Kkif2zu
+gNRzDYENfRyyr+mp9Bvhglw4XclbVEY32sM3CPSc658MVrpYi/Xo8U6BCzy9tV68qDe9udHkBZOzQBj

30YeuHU3Gv4FprQvber9A8PedbyLj8oqZ/vRAgycWJtYvDaxradJ9tL5ypVxibgoICK/XR3DIUnZuK0M

kkd0JkszgfnGMhP2MORUs9pWhokw8Aq+6VrPfRguFi
aIn7OEJG1fbcWVDg55Ybze82+Af8qQWbyIN+7JwaiN/ZuqHZ/5yYmv+z5ZyDea7vmhleGFpbqgWhjJ7B

S2Qt0nRkje9mj6P6imzYZMUA0P2kc3S92bO2bXvP0y+7+GfBczNm218MCq6mw/fpT2a3bv8u2FwU98LA

LxxTgv9JnIpKhcLxmh0vLuOiLhoVP7qYzgYQg9xD1W
YFQ0kNgjtASVpy2+m6yT0HLrKaeCg9pQrE0iP9zodpve84+ZJ0731URmqQvfajkc5AlPYb2kdmRf7Vdy

zXX2sLlvej8eWmkCDZYWutohizM6UDHh1GLcuEL+3gG+3v9Jmnvi7b7I4O0qhNFtHk3xiMnO0RjD2HVw

lc2AcTB+bWIQgc9ii0V/+otSnesJy9wfnpCqMB70wv
+K3wJElqPEKjJYud83YbQtxH+fWoWUF84JLtXRLwm2Ep6VK18pelyVHCUP1rUz19WdEdZMAzQfNwv8ss

Qm6TAATfgVnXDsCk+9kNoDiIW0dWJ+FS0wdsY6DZZemi5xWAx1GxY/vYFoDGrocUObEwy5x3/SYt01b8

n7DHp0P696gy4oRvb8b06MZvvjm3yiF79oJ31EczDC
/xEVxSjpXzbXit3yR9HZiUFnwBEPBaTZP6DG4J/vGTPwX8D3jLZXryt4F8rKI/528GkkVbBDp1TNJ9I4

fKAEJGu0ImeDQ2FKstLW8KPmvKtHU5e3SuT7mQX9Wx6yC1NHV9J9ieIoHIqJlhUaTrg5FqsZUO7nzGON

kZX92wlwOzG5Q0/J4ANUIeJnUVJpIJ8nKVX1R+i/A5
EtluTPuFNa3UV2qwPle9Ao/vM5f+N/KinkXC/MpTH6bWW9HQIKr2RL6AcmDmwhLeDeZwl7kWNBqM9g+X

TYyEleGsQq2/qsv9s61i5rg5KZddWPp22t2XSfWTKE1SrRS/djFYf5o+S28aCfaxJD0s9Y2vfuGM+rXy

Db9xgKFP8Y0KxewTBs6VBypVh3L2LckvFg5lSvcqh1
X4J8GnsEVlrgOozV9MSCPuZymowCjeg2I+JhcGVePo2wzr2qQHN05Uc5ewcO8x/Y+/DsSnh78q8l3/fb

gsIpIJBM5G1KiUHyWDGBYVtb9K1P3/jOZUff2lzHdqi17ARyZJP802+EcklVHWZs5G5HmXQp/ylMsd4Y

6K1VeJ299N4wznnb7v3ocuxwvOWixSjhxLc2VHFXIX
Tmvn/W7HOjVKh4IAvxjgn+xt4iho7HpnAFMEBM75SXMYHk3QhKnpVYh+s2m06Ne0TIx2NZSkeMtDuPzI

gA11ajih+ZQ2OzoyA8A/tHGlXe7/A65nJQIJEQG1kYpwEn/SXA/fqXB2fGSiAAvjiNETQasXzlr2S43/

tcc2U6kpvUWVFWfNft8I/p9pnbUACcsbNW7fgu7VMT
fFXGUTHZTLRfMmlbH9PAFDjhZQ6oOth+4QgvZSm8YhnN2dS+wiRaX47N7TGGGnvK6l5Ic7+2Xaoxn8W8

PqXZOpF+ko19QA0OurMT2Aie3kREK0SNxtEFOZTROIMscCBeLYRIqr/w9r6rr18f64Nv9rGayz6H7Uz4

3LEBehclZ3eAepnei8Cc3V9rrd7BRFZ1GH78w3Rm9r
c9FaPtm9EfFCySHCg94h8TmYmtkv1yfLCtiLkydumklHqc1DuDaNGAKjTOI+/zZmf/4CnyP+txdyUlYJ

7R6+mhskgzvm2FYGFgTE3r6OmEuv4qiVRGYsSIXUf5PcqGpGq5AGvnzdVbe4veILrpUGcrlHl2mM24iM

ckFH3wVcN2bMRap04abqYZqDbhoCgm3GhJ9Ej6vNzF
eU5fwLHEbsRTVOMpB3L5UcYhmbQjN0HD0o+T2wowzbzAeN2H1lnyqpzwVV9DvDW6zbDpKrrW4w4SuirK

5zZQzTxZc7gS7PXCrLxE4gSzu9aDtsr6e++NNll0zU6e4ndUFWPKWdGqC8WLOeBLG4G7OMKESScSQpuh

YADvqoJpL2Oq8C5kMsfa/iSdEmugc21XvP7y/OSLfE
ToULTV2s5AWVVFy8nAishrX671qTjo/rWtqT2hsbpB7xNloR6KI9qs6zYdamTrwyv47oOQiBSw1u1SIF

1CTOhxCjjHp2dY5wroj6n8XIcao5po+FqpS+RN8TZMETPtpcuAJHphZ6YgZvT9QYuOOzm8ybmc3ym8Tr

SSE59mo86XfLNhcpy+A5RF809xCssaaGYoB3V1ccKw
zWOyGM8yDMhfRrkx3UB7SioiGFWOoPULY5WKxPK1qh9H97zfaFZyrUIQ0zc8ZlNyRWhR3pKHKP6mqfzV

ITquu7VdP+jpg3sP+AiiOaeYvu+rF7EyK4kOt0grPyZ0hrCDsw2Xqa82YnQXAbSFOt61UeY9rE47ljq3

qy8xSPKDwdE2NWkT2lufpIfiFSqfFkkf4xeOxPyZO6
6HP2+d2xdMLaGfxEyOT4cMxnc7E7nDe5O3KNPgqJkCKEnpEfbDvL5+Kz6IN7W9rjdSv1N1MiwKECQvt+

uD+IZPxcgQotOkxreO5yDuPn6oNcJKcH2EoErufZaX850mafCOf6UDWzTqn9YrVzlI1Q5EpQSt3xB+OU

L+pXm8hOlxbwMn+3dId55MD3v9+9stImk/VEcorSvP
ZiipvqsGvVknLwWV5r57yjKex/srjesp4470bGIGXtVq7D1Aw6wjopuA7KZpGGUAW2FX6zzibBGQhelK

ZsUHJdOw9Dx5OjjV4Eu9tTYDYpjlwiIxajJpGKj6yq1vNDeuesw0wovG36neZ+CesZzcaqQU7Zdy6Ddw

vo2j4+SO04gh6AGF8RlHr63vHbjJ/U7DC3I5z2itz6
Hba1mx2VgoRQa/PnPXP8a5fPhWXC0p8OQyfXN2Blm6ZkwwDr0ud9Y4yju/F6b1t0fsU3eDoD8wsrMpEB

XUqmrLN9SWMk1ku2vmG1Z3Z6oDMLgh0Ee3poMsOblBavVnd0QhUKzAoScVJBmj4r+RBFgOOk2yAc7dyn

NI+C6ZvKpD2YJHqlkjL+DfUyHe8xkW7t4jpdU6Zky1
BMJGC/Ra6W808qmjGW85F5Pw859QQ0kmpCeO8OVbYehndObptEY1y242F3O+t4tT+mZcWw7o/eYvrIO+

2M6KnQb3gAuzUe1fE7WvQU7IYZ4/kA7QwFE+GvctiDl2Wa6VKGUvtI5ikW5227EMw8TuulBEJ3uMLgwe

tWpSGqzaWD/4yeYSPd204ONjiXSixxw8X90q624RWB
/07suwOiiSNw97T6g3JP7x1aUZy1o5LrcVGdeDd7TJVevw5ZzI3X1o41V28ZMRMdQ/HMkqWGAAigql2J

Q8cdBvVa+4bigrEbftpz5aoBvNIckaZj1QLDbvN5N/zpeGg27aM4i6vWSQ0e0IBJsazNFbtfcOl5ZF0y

IfyuuEY0nbzSrpCRrkyD69XltFxMXlzV9jSt1kfg8C
Xk69yaAyzRQbid+guNw0682lsjEOllS0HciN6wbAd3xhIfujeH26mk9cz+S7V9GTqm4N3u8QkkTtZ+ok

NOWVp9qmTKsWY5bTJ1ZcsB3au6ETGaL1+95gXY8wPt+yhXF+AAMKizPxo4VxuqU/93EAzNHKWQmdAKl5

lX76OsZfionda8OqUc90QlWwc5cuAj/0aLM1Wslaca
6DMmwtLGwhvms5Zak3D28qSgPB4WqGowylc73sVtPnSqfPJF3GXBbKkBbA1l3kYI7lrYVLuhH4VTErcG

KMsj6S0vLRrosrhfN/f+O+7PPmuseFWna2lvpFZ9ynmhzKcrLnF/sRxIIjVOyZV32XLUGQVKkkLz/ggt

IUVD1pK2SztaB9inHJ3ClVZKr7P3mlPvtFHlbz4yLX
xfPj2g2RRAUFBGm+QVYfb+YDLsjTKZn9roVWCqNC5qw1dWesC7rAUGp8bsIUid6y71e8nUKEbI78nNxd

pGyyva9cfuZl3/bhmOLK1n3icfErU4VmEdCW0AlLKWLp6BMcKu6Y+fdkM+jswNBzHKrEP1qgY/MxYjvR

FGJdJyhuseycP6duiS8gEV05dVffdZmYOdwZl5ufyf
hS+6gniyuhwH8PljBV/7/1zQSYHbOMkqhS8e8jftD1mgkz8cth0Sq7Eu9qm3F0CgXYrKROlSCbqa/Zvg

xT+3DOhuR4AXiCHEUyXcivf2slY2gjQviXOkKzpB0n73YBzDnf3C7dHjoVEWwYByhKIeS+9Sm/Ft9kIu

xz+7epdOtGmnNCRMJz8UxD9aq6D1sgdAzT4jnal05B
Vg0fNbBbjkwbUTSaeYp8EZPNVbRTFYq4V9sl/y+u3RMW8QtzWPm3wbxi0a1IzQdT8DomQGj6s/e5t/AE

Nu6vjVBryGUzmJbvxR82FXTMspM0BWuk7upof8ybfn/5VGB+sWDQQLKbq45Nxw2YNuBwGqLbWu02EWBl

lC/ZOccFH0OVO9RlFs9J9zokQiUK4NpE6Oug8ULgbp
vPvV5e/EWqvE44fEpNJSaUJHN0M4891eKDzpYRn2VA0tMkE4cyfdOI3rFKEx/zUJxEK9Qpo/nCv3Av3Z

PdTinkaipLsHIs7TmvQ3S+mWBHk8KKRWRvJkG3iSOWuGnFP5TwECR5Ak5lYAbmA+tc55tscNyR/7o2xy

S15EBWByBGWQuGy58Y0sMXXQ0u348bL+71LcfqjAj7
bWfL1Ii8or8p94iUB/92DoYS/9TL3jiwMCjcxem5Vyg1yMScsbLe0WOLCPceGDYB2aP5m+ZTTN2YBSYS

Y5tuSjm7wsJ0AFz9eVON7XoqOXUnZZ/3Q5AuGTeQ8HTgfvQaA8XCrIYjcWQsmxohFxwXXU2nI2g7n36k

f0Roo4RyJI2f1hc+x9kaNFgtWFl5l1w4Gts3syC6aR
GRsOL5BjzPNkj/ShULzG74MBKGi9V/vxPk0A/YfBvEDYhZ9buiTi0V5NwundKJ42ULXlu/i7RhqvCNHs

2uKA4HNCOKzAawnjD3310x/bZKwhJf0zPhwac23aOjDWsgTE3Y9hA4BeAzLjLScBvUKNj9pR1Kx28nkv

RDhsXd2vyQuDPb71yMgz+KbC/V134XJ1wTFOh1C0+S
tkyu7GbHlkoWRc49DE6Su143HB/px9qbFA0ZDEDKgtG6OWVrmtBQ5FSVaC+vTb2dO4XDF8dLUkO/e9CS

NOp9WXe3QWSf9LGHKT5c6skAcnKkJvwxV5kojIqbcs9ofX4JvC2LN/819bBD4o1wpDcOlJ3Ss2POzAE/

FpRqwOJaF+Azf1mvGPfxVgAwvxDYvj24PzGrUsYaPd
hTtrqtNJHvqj2BUJBQDcyjFJbLuxFF0juaeynvZPaW+rns8k3mmrSbfoW0f3tDe/nEioy+ZtOO2li0n4

YtfD5B9XZBMO9x/ESTB0L0wZ+wt299xAsjhw3apbJ9hboM3ZvxJ1GaY13AxpCyirl9Fh0V9fUr6TN/KS

IJf7BxvVJx2mE1cTV29Ux+zRICp85FvfKklYHkK5Vd
x4l0UavKy+/13PkecEy/qXe8Z7mpbfg/PqdadiDiB/ssOktDtmzyq/V+h6AVbqGM17VXPfgZ1Tjy2+kH

Lt/a8Q92SRRnioME5Hn/Z0zh7JgTfAms7LgHo3oqbQSqy4yo/utCCKzrIS5m+EqefUoL8YaY9e1+oHnl

MLVjlYLknS0eM2C0S1XpdNcCkd4jz67CHeO3iGmSKT
1cGh0HnLVGv98/0Hr6j5lQpF6rMGBoQE3/4c0WTIKk1dQeHSU5aeYgs13i1keMxJPi3MXWlpldBIciPN

kNgXmPb9swim/fYr6JGt5XE9XHWC1DkywN530W1UVLVrJzEr+U7cI7SgNRi7tIrhJGE3iieBGlrfgCHT

m/Opv792+1rhO5NRP+5UDE8n3h439DE44YWYEiOK5k
U2ArWvVxJrUqGM4LO4bw0Z8AF+2tHs1bj+YKm+1EgQewKACgmXl8jFjBmO9VXLuVLraGbDl8eHaTxxH7

v2zRI2F86+/taHcuQGT9NcH3jEZAypVu8Ur7fDgJ2J7nmg03365XI94EJn/Zl4+1IeMI5Pnb7jvqLL5F

C1oj7iALFvj3KxKH2H/nZ3WyrtV3nA2EKqh8DaB22X
lckf3a1KKSa1dDX+5r0uhtO/buA+e4jKARdQwo+pqK/XBuik1R6cyn8VFd2fxkIdjWZi2BALNxT/L0yQ

vvChL0YLTiOo6wup7zm/N5DLElACzy3F2bIuEgSECZtQaIVDFQ2Xg6hHOWTcA3BSHqS1stx6uNUlTQDP

YhoNP6q1iGD4wSECfVlE5zpy35titIeNdhqkwlGR2X
r6P8gtlhAxxcvtsGTGbtPH5XrVklUjb81nTLA9enFJhH79oM30GekxlD8b/MEhAoplcEjq7iryL/l7Dq

Z2lJRwtgtPyddePTZ29WQqWcSxKXavlWSHDeqGnChp1UYP6Kl4mTgbFMyYRKRTTKhuuUW/qOJF2is9sA

tRNUDUqMPh2a6h6XDw3sh4lrxG3VdiWhrC5F6APJpM
Gg2VzpRbAVD5BrxNNlS8g4rnzag6SA6uwTPATMOEPh4MrPaHYISzhlsRggi2N3QnAPVOnekKZ8VvEHB9

CwC5g9rFRq0SFqa64Cm9NfgkzNnboTXGhEjvfXzAGOQJ2R5qzuO+0TYM9Kk2utvVUq3dL+P2ygLtnKR5

96FkzoskuofC6mvtA8scsI+zpdO1MXVOw4wF110xR5
8pjzVCY43kw4QkLduSc3CKPWFgHhK62JpiO/ULJ99UEwdlwcilkyZ/lTHT7pl0R1JZHwVAOV4QSLVu1i

PJ0flnRbYMql1raZBHRGoxUmxGPAIw27eb3yAB0CW8+B/jjxMS3LaAuC0XGFabAFqNfPWXKq+iXW4pdy

rYJjloiScvzFcjUOEzHAaULRLausT+zOLu9pm58P0J
emBqp2qZkLGJr2qaDqE010IhB+HzQSVqZQbg/TQG30B2XD242e1iOcpL+Omrr64F2RxPTz0C8Yt2noam

PtWlAyTkjkl1ztr1T8UPh8cYZbiDaNETjAD19ZsEaoQoFg1iC3XM0SIzr6vTYAgiXCVjEWLAJapoY0ex

usm5Q4CdRctftiekYdQiOzkGdQMcHq5TwZsugNTxDL
ZkhaK79iVlPh+WIj0DLEaiohjL+5Fd4E0QUekthMtrXMc4LwncMIXJ7wD1BZdQnxRwI3yW9JeNDgQgdm

CM8TFQ5DrXR/g4bjibtY3bb3OMkGP6TukbCGSI1cNMdC2LNAkxmxZZ/H+zjN+zimVP1fHD4xTQkgxJW7

QHSqKsmnJCsK34lPj70CTBFpdoucuY2PVZwX+0p/bi
iKHGPC+iW26UifU/27h74nW1ySzESpDBJAF1Z8J7ULbHBa3cQLuxWR77x94TsxmUgdeKxaSZB6TQ5djc

/w7CAbavAqw0IEIBYW+acHktrfBTidx82fgIgre3ySFYFXJW2avJRExtnrmH25ZO39LcZjgrQdyRNr0W

4aeg9E/ZLOox3D4bDX5avNLRcoVpV994Om0y0YO0bP
UqqVTKIa0+sse0lGE2OXK5la1BHJEkKKJDUHYuoP7NnESTJMmhe/8ngENdyJ3P/Ew+UP7HJAQPO7iLEv

TPmLRioxvvq1xsz2BuOvMXopRidoBmZx9L3z4BgnzfugiYYw3CjNgN9hb8A1H7ckUjO7D8+i/Nd11TRT

bl/cn5X1QBmJxSMBPOKuGs8V6guyQeRNZNjKs5Nzz1
LcCuODS0/3jLwKFKuGPiz6Yl+O0HsEMrhzqgdTiBTQUCMyjy1uAfQgmIWN4ZF9D7TYhe545LIFphKtpe

si5Z0hJG5VbtLrdIutv9Yu7zjlNcwfPho6Gzlku0qblyt6RE00td0znq23kKGWIPcoBI3WFdd2vHCoTp

IunQUO1eKwLD9PGTEgtFic1CwHWx+EcabNBBpxibGY
NG69Ixg918wRc96+kWm+VDPFmUtA2/g/BE0y/t5blFbOl+qt806nICcqjt4ywVwQjppHdpx3eBUv0GoD

V0lske2TveJvQb0XcrTC99AMaiLHGrOow06cHb8B0/05FLqfWbF3ZWuCVnWAAqgLg0E9SQzyUlMimwqQ

7b/wd8rgV21i7VW6WdqV2m/z8+RLJ+KFEEpb8i8ZKo
ZZJKF3e+2of2EGn/yUeXbwYSakck0W0GwEgZ4N09TMf6e3Tzltwmn5SPvgkto+0aEYKvn35gYoN79qDV

r28+NRSnHl3R9vPyowN694J4+hIxFE6/G3xk7wRL+fNT7+w5TfY/pHIiE1CJrG+C2c5iRVqBbB3m5nN+

XgO8bxo0sU/0pkzQBrY4jdscRJ8db8SO++HGt/HdX0
c5Mbfm/WOrOaqQdlj7HeW0Wv7FnU8m6fBGgBZNQVAHxXJkl4lZA4dVL0FlUX2T8pa70VoKIy+qzrAnf6

5fzQgQLx2kWEqtN/4CZCBqZT1lXmd2xmA3LaWEUmft1quTlS2WiGotzP6ZKZQLN48Z7gc6pAKkLo9gpH

jwKNWIKtaq8Cyt6MhLW/MOLINA+RILdiVGxI6l7jjFL/a
ANn68m6W6MQq8zBufbyHE9rtaizEIMkb69VfWOeCvwAQpFsLxAAeOcq1qj+gWDFe6jc0dbqHwB2DSxwD

gaesID2lrRssATx+nGiu0sPoJmy0njJlhgkOCsG2PvWQLejCfz5cXOjky2/gcSUXJfltsGwVmuGhottG

xkGwuSgTuulmLlQCTDecTezXtEGLGeF/ZfeBkFik68
wyu0adpcPe6A6cT+xelYJ/BPiuCS+2p8f1Z65X3ui861FwdWbJp+f1aYeStG/R9uGYem42Vk8UqUyTyd

jR0MjInVbLvJXzhKDZdWAJudDchtrGCCUoGavxFLFA8kqYnysSVg40En+r+lp1/dUMT1gSFO9O5l7WCC

ztEp97BqqlMYGDbsng85YFu1akU/4bR4u9x3fEF5ld
S4av/SPJWjytczeb4/TCk/Wtmf09W1W1GwD1X9cfkZelCCX2YY/3Nxb5Q5Roieh/jjwcyS4/z6ux2Icr

v4W2f5CYRxtEsMR/3cTfTDSSy+vH+0ZUTKNpIm6G3d6oguRf7TqRigmrSOoKqGADCVA7u94n+Ka6hWGx

EpLTg6M3PtIcabkpcy54U3GSj5c/G8YVj5Myn5vumm
yVkagghvj/2T4mgpaRwG6XoXiEgZEDvNG9jA/AY4/oia6wFfhGZNL/1tQ78g8JO8hLsfTETWbQdSvgXx

HjaoYqNZylGzYGYcsUszb7sPWv/0ashr4C1SyZXk8YCNmNPnz6T5q2n1aXPY1+hc29uikZ/xG6OVvxtN

61MlnIY+4Xot6NlYVZGFz1YbnUEAAtXD3J1uUMh5s6
6xlkwBX0p8y+B6rM1Yx1T8dKCnNcMkOaBxt79RTx0zPoUY9/vcpFZnikbhQrW5v7tHG3kGW9pmhTCHCQ

OxFRbpA5+1lZapVja6dx0nF1wLlBEThHxamamuva2sYDZeZYIb3Ditg0GleRXW1BFD7vGztyufxqXgbq

K9Gr9k/PV+ZisMBcBVuQxXFC3ONBPCdpWjebnuS0YI
Y9mpMreSD46ljFgOsO4sVQvGahJxhKhDSq2MvwFF1fSWizzit44WX8MJdB9h6B4tDJYtGk+6hI6yr0iG

IIeJQcaTMFAy/6In3Ae+pUAmWqXGXUFNP3exGwf2pBr1Mf+UMtmGXGAogeGGLZ+De9YHwwQM/iAReFMn

asuPI/tk4wUS7+fi5ViNPGixXuStkI7puqWiS2lQNO
dgYxPyS/hMxFMWwyPQ3GLjsL7DWPsoMbrKlvw71sTKwVz0gahMRBywKYsouBDXJ0iUxBF4h8QCmA47EM

FiwEXQzfkJdekOEysLjCf5rOFFiWnKTvd+D9yKD6pTT+CPnyjuIJ/zCEwnSNNgSPklfcSuZgsen4OIFI

Qk6aSCeSX9A20//mbx+Sk7bejHqeMwCqPcJ3KaSJlu
Utfcg9BD68Vcldqye9DV2VJBP3Ch9PxN81Tq4FGJtS94ZxIEv87mc9Ynf1pWqWR2Jfc/Rxf7EbmyQYhB

a3xfunFVez/NqPF7ihiUJ4u4uOS9kVnzYY0Ym9Qpl3pVnQAvQtws3kpnxj6GHlJOKrek50ANdaDVSUvh

IyOhjFAVRMnsKA2oCwCSbvu9taJCbVDPOa9oHm7LYe
2D9/HzLuBnA1WIgLsi6gmbHhyym6TSYVMSp+iphRfCCYSte/WbUqG3n760R4n7faVB6A07IlP/d3fYm5

aEgPwy5b2KqtHOoHUtBUemSFTFN+ix9IbGBKOapTAhFzp+O5Ts2jb4kFXztlpcLn9NAtzxQxbUQ8C/6f

0ytSoJd80EZR1e0s1FmO8cW8EEOqfd5TuV9Qug3bEd
Bffvq2hpoBaDCzUzhDRKGrSe5SOdCdr516z1x9zU+M0e0y/+MRMgna1WSsc+zjQuu2mpev6uc4M+3AF8

fQhH3MrLEl+bcYt3nesMAhe4fDSS4XetOacT4Nltzf1dn+PxYaimJvl4vFb3llczqrSfN8lteae2b89h

6u861F9wIDiqa2HxQoze9YxoTCsrhc82+/zylLiL48
emllBY9kO9P0BbN2H4SaCVplNLO/hM/OgHeOQsEEQt58+mYoJqYf9tWvDMqMHf9Sqxj2mum9nm1hRD1O

E1aWTT6g928XjdJxrZe9Mn5zlzdtwaDqXzelvq+B+kN2LDcd1yXY1Z8vevBNj0GPXSjuAPH9+Di6QM5G

ECb3++zR5MVOlWA82H5qMlYJr+nmt5oujxQCQdqZBb
to5YobjH8VcbIaW6THzfM18kT+MOZzjaMy7ciH7kk8QOCG3eu+mvwYrUquef2lx3yEcfHiJoSw3bpuFz

FqlD211M3jNo7onu3ecfzWO0fQ/+k5HX08Sh75pEgT20u4br8+nFC+n2HHMXu81nnfFN4VJc/KILO/rvDN

Zg2/iPiiG8cialbb9iGjSIabRSGiI6Q6tRZRGT3ZeW
XpkrDJ4buxpZk0HNam51fBoiYYiC5MJLg+AWGGPjbiPdC291YFZ/aBOrirXnxqAyo+DkdWX4WejR/Fol

cK7oLbvsljsPqLh6pGN4Nqe/2kJru4CsD/+XbEP+62+5xYVxRAWH5G7ursd4XvmcYVZhYjZb8cyTpX/u

i/YdXEzEj5i/viF6kmrVtY9/9wR6+srgolQvonJazn
iALA837KrKaDBoYTaKfxmZoqC3r4lOQ8IPN3F4fri71B2k7nMBpWktBFu40kNXnjsIg006pZ6BjcACJc

TgqIUiBxJbU+pJNgybA3j6V6oCNCS54IxMsRd/699JqI/tEBUy9R+N8tB4f5kmpJNBdnAqRw5LOgy1cH

icDA1+s7aD0Uy5kaH44SL4Y8N6AUXK8lHeZ78JWUZV
MCf6xIti/3oMsDHqf5bywJzDEXOOnmHe7X1HNUB/elrvKE9Dts9kiz9PdULVIf+3KWEozjNYwIC1X2Uo

IO7ZdXR5iarLIZc5zOmPvba0IP+MU07xZQz0I139+Idt/PzVNVkYPb75nmo4p3XKAJfLIh7t9LdATeJa

G1bWw1HOo7fJjZvKUfR1Zn1dz9H9ty2cCnYuOvQKu3
iJMrQxw5ABBCiHqJBZoU6y3ctNAOfIPBwV3yYGkBG7fhDVNMw6McpCWaXyr+9Wx5Yl89eK/WZO/b5L3u

RUx+dvKxTTPnpbGO20Gp/x60CaHMdHJHwtzXoZyUpNY1jZHJkcUSSdtGwugFUXTawFlJGktAMoqdLTnS

z4B93+vGUytpDtxBbq/6+PSSrT1gh9JfZsy8RPMZX0
11VNPlGUamp4ZzO2kufEJaBYTJxLfBbXJy0357YtYxVTdY/UT2l8ePH2PsK4fiL4z2iRdRcyS2AdAco4

YyUR697dAfZ6jTCLf+vqL8DkRht8AFbX7QAb2VSYc542Pr25GaNSBfME+DFjzBj9is+qCmk8K5OtLC0C

2P7N9PzkkrQgEfTzaxvEHvFD2Z/MjNF/aqxfE5V8Jo
piClWafjJ5T8mJGWMXOocqFbOGlbmhl3QhP17s6f1Jjsndv+JPTDdg4medD9wtw8oZVeNJuUpw0uWiM4

3RPW6yWHakEXDtGCVtt31TrjBFvwLjrKF+oEvc7Vkp9ZkdFVu7pBqxcu8LUXpPPo7gDF4zUqfttBTRyr

gWnkpKkiaRP8v08+kHGRJAm4nsz9fQNDNB5+2rnEz+
w7fvy0gXSA9w8ejgaj6Y8gBhK3R/flFzmIMxrtwcD3YugC/Q0t14juv/iW66qPp4eI+DOPxHP9glZDlN

YVXNsw+kzsgpV+FS0ayiOlWmFyEOwy7TmitkCk6lumta1iLf0eJU/H+buKs/vSP/smPQgaB2NknKoYnE

u2ltqG1S0OWtIb/qFU94OphlD88UASBvl8nA1kwgqt
WWqojJnYUO7DKUvR/KGlcvK5wAJE/zvaHo5a2LdmQ8jZq/w0Gv8VRnHlmG4fqjcC5x3JRQw6xVnAdlPU

j8WOMvyJMBQu5Rpv4TeSg9Eo/UsWS7MWWXHX2RPmqloHw3z1rJjkvYd3UmBsX9eZ0TeCsonLeNDkjvb3

xpjT75flkGfrOgBGGSv52QGAuCUTbMz0p3jm1TwyLC
gBEhRzsy2XVlI7Fk+zEXhRZ9ZHq00T0qJuh26NTs3nAzECRlerrHROI8HiOyhEfzKEqtdIve0krc4LL3

O9+So/n4g1A/mbC2nBA3CwAdal7EDi6LK9tKranoZGjrCudOUvt1sijmX4qNhgnx3WtttE9LNtZ7Zqoi

d4I+Fm3WGDWi1z04TZ40i5Enz6bqdeFPSQmeP0Pnaw
u0345SE5kxh/e11tSL5rKeUNPH1+sYKib44pBBA17OQ8S0crh2ptuEAPTAE++/EdcxUf9L/4LY+DqIwy

d078o1oHmV2HoCA+PrqhXt4do8wLiY8me9y9DBBCOIfnc7DOYQr+6ISZHbhGFL0i7pHQFzj6kPsUw3Z9

0ZnH2eQDLrNO6/NR4IHOaxw0vqJ0bHdx188mS2IDl2
PXNWgbcqpbefev4eIO3Jnx9XT9SpElAIHlvS4mNEuNaIy0bgBL3w+OmTJ0d0+d4/8JJvUvfajPeelUKf

oR28mVsRNSmx1qoTnpXF6G/w1+aiDJQRnlhb2KRSRV4U/qoeAfewdeXEXEJF+WM4zEUS7iGeOzKtXV/a

bxXDpk2xHElHX70kfIYKxwMsrN+OPGsl3rjpJS/Miy
7nd4O1Tf3vyaOrgJQOyAX/G+SALEpR+oCbJDTmqLNDxftESKb0J8xhX0MknyMax2L3bTKUitf/PcAZBW

mMrWtxDnQLMxN2VTFMWk2CPXyTMxnb23/UTWznrLN2tMKFMskEyzAzaF0kZYc6qTOzJJS7yeceoJtTN9

/xOGwe5/1/XiNCSi2xyLikFNUyG1ufTgzenZz0bo8h
34kJcS8CYC+Go6faF4/7906asxNmzylNYHdCIUdALLkEW3JeUhIfAlkJvQCcqvxVAJ6S1AspMPKrdqhA

ANRmCLRrEX6JXEGoFZq++bORdnzsy5/F+di/m4x7760/Wb94i9cB/LbyRhfEgpMvh2mkEQNuULMFVqXK

vn87NA1/0vrv+l2yhDF53CxJmwPf6xhqV6n/76vn69
ms/QweZpvVXhII8qJI9dCJOGbctyXIZhjz46E7oTeixFVg07Iw44SnDcVHI+wP0WMAvWl33dM3s/yR9P

XX7OrOOvKmcbRWX6CKsrXo6R/VTxFrzJPw/lOqBxKcAX1Ws9rhuNlcMm3uC4Y2XkUXQo8ekPR9+54026

cA7tGcjE9p3QuXfElnCZg5BXlvB1/uFq7R2FlsrAEF
X5DwSroSEpVDYp4BaMTDmjyRsPsjKGr+JyOmjDweNSdyulAqIrTA6L5XX+iumvmqlE/5xTbHYFOIaB6i

VqdB79tfV5FB6FTkW7ayNcgmPg+YY4BCXIaL494p5zNbti4/47QkbpFQaR2kniDd5iHLNL02SkGSFNm1

6nrkBzdrXVUBKB4nwwZN26q3//FFC14blkhB/Hx199
dgZxgjAe3cetltf3eBE1rPsgsHtGvWlAf62cqWquQdTq0ZMvV3g8k8fT3M1XFLJMW7/WvWbwxi7mETvZ

4sOdjMV13vr2EWyRfIxXeoNNCQeKpJQpd7e81LzSCh+QZuBt8edHsMfrQBNpDFomvBV+3BzcbF0w37Y0

lnmxjq05pHXrOoJF/bAB2XYqPwKOjQ0rqB1Gcv13Hi
QYegQb/fYjHkSNh2HIm9W4V4SitdmtnUMG5ecIcLN4zbE6IoZD/dylkoqwZIk8xgFob0y6TiOR/wrO+9

2fOndvk/mlze2olD2498r+saL5oAZn3oX7eN+fFgoT3yZZ9jJjWeVGjzA/EIA4dYJQR44MVPVdSzrZKp

PNqlktspTN60Enw9EMaDSTRVKq1+eTINv9Uucm7eQ/
Hiw6q/LJRwqo8Emlf0Bbv/LXdFMvlmepgNq+5N2NsMjovCwjIHLzh2MQrD8V9+DSoIjyMMeXi1UGM9pI

qpOAFH+gJu48IragnBizkRhjPdekMTKaWkmL5lWpTK30cis0rKLcmyMq+czCp+y88CzreB3vp+qzY1i3

xHVHQmL8z4VAL8VlOnSggfLL/CEaFT9hajKTDNDdTg
/HiXZQEnKw8Klvv4SB8uGqsaZ7EDKcdOA+VHQqe+qsloKO/lvCtlAvpGNaDozdSriyiiWOak7S3YmMBa

mSiuc3Et+ZGyyDW6pXGZ2ubKuiaF7QNsaQnb1R1vvmM1+1/LIioliFABcPD1n35+uzsvSZ4f/YwOXlnb

R4FiJp28S/fYNrtsW2nOKl7Q73ejeakbgHElto2VLA
Iwm2dZpAMVCwi5EqlJPTClYSMViXR/dQs5UWC87LqyLhNHDLXhb4H6ZurivbIwpxV7vwUBTmDTbEDlWo

s7nHVXooyGGaOG+5IN06Gvb6ZfoxspZ6w/t5Uoc7V81dRjmZ6xERXIKpHW9Sey/U/uQIysz47PgGIZN2

n9UbOICSPXXJRX7pFV/1GJ+lAgB+Ey6gjuXbIBxT02
imqnZlShkW6KK9QzTYhJIIUofmiWrvx3kyH2jYTBDkvHzOWMMGaqlvFNdQIXOYzUKP+eUza7uChUYjJT

VH9aV/tn2RgYuuwz10YPqlNQr8Uua7spSe7VCIHN9+Dc8j9N9v4SntsTa+P6DXqruEXGqLzy6OQApDP1

ZhvafSWt+2sGfYNb53GiMBzJXHFpFnS3+7KynNEwM8
AgwlVfVNq8FU6jtJk6Q0tgClZhbSsSge7gnsDERdBDBCFqBlR05HqfsxLgN43D0SlJZ7QmMQyqo1/GoB

+95rdy5GlDwM9Jnz5SB4i9uWL+KRIsQ0BKCsbZ/7HOioMG2yjhk5DBl9TbBaFrJLE9U7lHHowhOIBmCl

uLS3PZ4z/T3dQJLz0KrErckhF/G8aR6dzy2MIzBtgX
NbUxv+QBsjlYURQtAaqk+J3EOzyMOqDu6g/omGBM0qIPlfUjtlu2EvlWJTFLra9va4Vp2Gp7CbhEW1/f

Q83V3FKf+gpTYZ9ahdXGRIRa9r7jU8wg+RFwDixvPmm3TYAb0l+nkl6Fe21OtctY/Nfc/ibsFu1Osbzo

AHxb9/EuuqFEtKRk7mjKhY9vPGznRSu+tKyg0aO2Cu
g8IVkdU3rWgkdV0nzRyd0/ZbHAA4kb0IybVX9wZ1EuoKrxCaQ0zT/7Vvkf5xZAsxLrTSW7jadkmXRrIQ

GvUTLrvRK281fAoELTh9X6YpD3n2qTvYw4JlWotRFs9P9EBtwPjlb1W+rJ6Z/zb+y/yXl56O9Tm/awmR

Tv3CV+nGgGB//Ang+5YVwZwGEUCHzTzfTY8l6vx+ll
ZtUkn1+H9Wk3I3dhZ3PwsNemjG25VFoBG1vN5Vb+26/vH2+d+Kur3vf27SQZQuq8a++d+TkLwEnAnjsP

/ViR2RO9YcmQoXkJC9UOFG9HYD13fxInKOm2Ta62S83Cd8tspK/E5M0gcO7s7yyin2SI2MnjwVV9MxYb

wS80R/kxoTO04fOz7Pyso/NYH/tKnP//aek+iF8ZNz
qnMPWp7txa3MftS+6IeSgywAWAYHc5P8PHUD7CzHYOtfrOJLz0j8Zh1E3H5WUje4oDwqFMRqc4O8bBjs

hHlBc2F/b6qCBsjHGNm+5Gi+qprMVBVWqg9lUQSMdutRX95e44rin8UyDqQYBeNlbdThm/IG1wdjQWP/

/odWqLpk6OKhPKjYiJDLbJZJ99mkO/JwLw2cxICecI
3iHkO93jKZWWHTT2kG6jR+M+zi5SlIIYpNJd+3Et2462Hz23wokXf6A6IZWr8aGto1+umI5ppC+oRoh0

XQNVWy/ZSv5yHV6eKkDu/bCCHzPRXTbBYFcjMdRNX+torTh4te2fvudQBn+WVnmhWdXq1v8mV3t87Jul

jgQWv60c5v1bL8Ej7eTEkVrx4oBW5wTlW9ufwLUAC3
EtZN/ooDV39653svmUF6IfA78kvTAHR5M3z4Y8UsSlWA84CmLT1/n0BkqsTRDlu+uVyE8u4hnZPQuLU2

MWR93xLp+NxCMPW+uMZ+I2UbzugwV78xzxNJWlUbi7NDej4qrPKiq0bjjuhXJZuU47t439qTKJ6l/D3a

XeD15Y4LFxsSQeIDBm9rOO6PFkXK/tjphI7eqmx2rJ
GFg4xIkS1jn31K20n+WG5140ZujR1QVYoie9XsWyxrRcLAby7jWfZ/qe2viN0af0s7hh6oMm93/jP9Gh

tUIWXN+3i8yrumUtOG/WHxvHX9ApKy6fR6+gHDNZY/pGAEzZgUI6v69+RJUxl6TKfP46UgT/Hvt5zqxG

G9GE7+doUxl0Y5Hea7EtX5izR8qtxx1D2LlGNI6cKg
rmhv5rCQYofebm6FXlUMF6qgxsiti9FNLVEoYSfSEa3i6SHDlKqr7/ROBEVKOuM0aGJ1ZBibuVNlRTBL

dIvDZt3/bG11O3UzXn4eJEeckmJnhM1wduWFXOf68Qot1MmhX+Zj74y0OgtdXbdA7VBdyo/V+Hr7nakJ

z59mUgE+IXpi1g4G+pWUMEqw2qRb+AggbHWfk24XUM
cAR55UNfRNL/szH9R9GeJ1isImiNe7X2+Gk/VKmbmq/QqZT+UQF7FdA0iQ39ENerGa+/zATV2QO4Tuqj

NuJHOnlQBbUAXu+Ua4tnWlQSAz+w/DCCmx2nMZDQsHcl3fdAPw6CoT83M0lrz5o17mORbKSZISnDOaFE

qTZBCHOT+/Xde6creyERvjTwY+4yZjx3KYGs0n2Tox
PHxgLBPY64RWHKS3dgBUKRgewy2mRmzt4T+uGFOQoyZXezYOIR4tdt/iIU6hREp0ybokRvThohnFl2Ym

+CkYXoxgH0v99csMuCryeWT6kR6zE8igwzzgOkVAWDbXIhlmTtNZOkhpuvvb0N/hTRyPA+bWx3swrR7G

XXCZO5+z1DW1J5zmuQBYhhp2nZzMJ2oStf1MmDslH5
RUKSGfkIQbJrmWpT/JI0c7a2fY+zZf5/ZDIFzWjPvmiRgZzT2sVO30wx0yeZUZbFXj/NKmP5jNRZyTy1

jb0/aXPwbikaw9d18/3fvUGr4g7KZnj4J/aZAgANNhv2c0MNFKwKz5/qr1My9Ne9EgbvtSx8+hNo3Oh+

XtVuiX9puQ0GWXKl6+jv6ChRk+6Drp1wNkYUz67OW2
X4nxwR5bFly0U/Py5D2z2yuwenE1ShuJNwa3apPDexQTfWx/qD6ybfB98jvQiFBncu4y+JIbxzMe9Z5D

aG//5LkW5hgBkzRVoaxptx57iIsXZv078DrBEGj+zcKwH/ONicGST8Juzpbnd+n+xYNJ3+/oQtfJzLfv

Yxx3tMah/KqE3cZJZpX7nrf3/5dHhLgte2Wk7N3+6R
i5ewOsSm8XUwYDufkj4rRapLOmDrWAu1BmX0mKOEbxIaz68JNGelzzbIPikacMztsZrDXuw6TmXK6QIg

499p6L1WLFMck6tj3+ODvaDKFH0wL/W1qTgILUWpotyrOH0EFw64XG4OKrHNvAzgPBilUw3fZ5PxknUr

HWzx0g04rtfOWFz2RMcz4DqZqQoXiGptW/uwSIkWGd
H7pmmlrXylglEqM05Nfzz2Q2HnqSu04LWMRv5LNgqu0WiLystjv2LV0/68Cm2HrJi8Of9607GUrHlj2q

YEr2dn36JV4YdvUfuPz8qMMKI/23ZdYctUDxUbiF13XluV4wUOrcDYyEpbUNr00/76TwheGtaYuxM0Eq

W+0ZQFeuuNT3n+P6VAO76drLasf6rNfTu9WwBt0F3I
UEiWa0HKuGQKifDaATP5wFIkqBdo2t7+rXjYWnrI0vtAyytiro/cCz2jFWShtI0GcVcRwiGnOzE+zJur

q2cE2+pDqAcyG36VDB42hPD2m2mbBfV/6HFyDZwZ1Oeo/ak0WANeLBQLZLU+OcY4Ebwonfr6a6hZ3nMQ

6GLZV841ufoS7pRagu0PC2DzfwIR8ngfoQH6PKN6u2
Q7laOw3WuFLIFp571FcVdU4lmr3FIegu7+z2fYRdjhurQqcC8eyisqT2+eXK31OSlwNZV08a8bxsf13s

WH8I1BwOYv3W2i/o3vwFy+oalX7O8kWfWt1+zLy2S+VsFqHC4amINhnJzD8faNLd3pzIYP8lcfjIwK3H

cBaMYj0SKWbiQZ4neRR/d2dBSxY//VdRhMw7yx+NGP
tFTX3gXfStzlqnIlvt43bRipGJttRaP9Zei1a/UQAFd9n9bnm8gmUZ3NyC/OgzNgJr3IyUC9UN0M0EDB

hE/vxPnNRJ/skvm+tFd8Z1F8dh2XCimF2Nf3l2ADGAlzYvGJWKmIZki4IiluJ8Q8Ba8w1JvuNOGraeQX

hfMhp7/qNmW8N527xx+k0E4IsoUPtLQRIV1aBvona4
YCkPTeudkp7Q+HRE+ptwssYSnKh6u9+elClCp3az0umMCr1YzJDKapPzBxUP+m57+0TBURyQyMp2PbUa

6ov2GMSBEaYTvQhseSmTQtfUVesXgYURy2qBjF+5TXt/v8B4mA+fSaLllLbjh11yayS2J5SXGXGvBfet

JcGF576az+W/yHefSWIJmpK73eN3Mb7dDRro0PORYO
ikfvT+V7F3kkU1KL/Ng32ff4e3M0m1jMcaMtRRB6NPhmQ1uBesyzcqSIDK9o3Qu0GPE+WFDeUoCLbwEu

E357GVgfd9ykknTEHwahwayN7EuRkJHGBg2ydyBrOU20GmDPHyMSh5Tv8JlR+qzJzNvW5cYyLAXhR0E9

ejQ+rOA3crcARrJ9ArCLf8/DFM9H6hpzpmPxeLOakm
PdOZRGHeD0nl2aWjx0NYMWZBmS+J9xevAAM92oChvmmmF8+k1/P7jcEH8bXpP2QrUrq5MlkpiNiFto3+

euIu4xHDl7uYk0gX5mOi5c/v10l6JXlhfMEVnoIocx3+Bqp/C+gSIUihz6pE0lry/jQopt2M3y508YkS

Z/6UWZ8mPLeDqIhYWneskT3gIuDe/l46WFMewtvnks
fyNHkbnB+jjxS+DTQUt7uR2h7sgV89mZRVt5dERjWgNRPa8VNKReuEDwY8hAovQBc0YZhr//Zdjwr1rT

Ogi5eoTKu3n7eAw+B3tze/6G7iUKKhocL2ijAMjdwMxBHTWoMw/W5OiMDEhBI4XeugMH8eloNjGNqUI4

bzpZugfOuHAvM4SqriH1xcxsloNFyOvGiKcAKKo6Ta
ZHAm/+afL3v0T64amoKobmYFS4zz2lBSHIvJTIFvXQLGgrNkUrdWDp3QF+APj+WhqGkcR2X1S5aAU0LJ

vf0JMjmlH2/ofWPKet40mnOS0Ae4SHmFuv4Tn07St1ja8za/jxIHhuoAXXulcNZKh9sqaFbj4wVJfx5G

PnWUY1hUfw9PJ0TgLJqk8YM+Qxl/QifBNDscLcBwKu
gy81BOh0zxfXaP8MR5xLQC/vwVhW6pOyf4l/1GjtQqRaXa2nM570LgSfaqC57USXkShGfa666DEHiW1j

hAGB8QLgdNi70bYkn8L6pn0xr6159q56ETeCobc0w/jORRa52gqDxmZtHZXoXhC8qbBuQFmZoubn8QFL

bwSRw1FnU/j31j5TQya4yAeYsZye/J13MZzfEfAUgU
fQl8MUFxtavjMsZ9BtRgn2DDQvPwZy2xoJRffeZgqOKTG1XKyhZNp1KGUAWEWfTzREhsMuXb3Z2FS6mN

cCAnWcCkCzuNtBDP6jIoKDsBs/dGP95vdsZ6BI748lUWyY/9IOV+y6fFHSOdT/512UyGpUNQT5vTFK6s

Uz9FNDRFGDBRBT7CT7IkR7hbl0bmmljHqOGuuKV2pS
A6vH32rbrXgVK1BpFFHlY7GMaiGhQow7PU61+HOEN5OM+/cmsyi1ad4mbKFoVnQ7g6OTbi6W8lkPNvQD

ZGC115ryIP6Kr79tF/Ntx1F1ArsDPklKIln6IsE8CypuVSQVobjhR/av8vVMzdAat9gwv9IuIsxg7qcH

pm2UIrTkkeqpOqt4tyWKskSUYdMG1mPITJqjyZcuhV
QK7AIXlYCF+BRQjLtoG52bb2Bx6QqlyGKFBB4QEmNG7YeVHalVp7quRJPpZwpZxS0AA8/BYmKY1tw+0X

B1t6oart2ky2FM7A6ttjMaNZfpIYqDkB+FCuSkCgltXGrkVBa2YK9DyWeUA4iNw3uObAsrVJ050Zdv9C

L9QKA3XKujIZSttBkJE6clPHFSf87scgrFXtHHuYm4
baFkEAtUSxrnCG8e4txcr5Bc11xDMF41LQ94/lDL0a0jlW/eAqGqZ6cXUj0NtEVAr4U/5l0SGR4hysop

0pdZtADaU62T+AO38oRChaeCXjTt3gST2Ewxtt+qWRp1gb8gv1nFS16jF8buUpTdhG6UMeuj9uOw2JtH

qsYKh+Tpw6TMj2RrVY/UP4hDV9Uqzftw1tnS3yr+On
ZDQ7xajALll0A3FdZ8b1eaEwp9LLBnf3QOGwF4g+5WjvjpOBFq/zIayG5tiao42JcQa7RKIK8VfRu6xq

w/2ZHU/Lt1tVkleJU+1C1FCiEhA/0wXr37Tx0xWouEI4AZKOlnIBZMLQPQ52eUHNlWdqVv5AehTfiXE/

hgOwVeimYbgNyd36ApsZTV44RJ8pXx7sGLNJQQIbys
GRsoNO+oqQbWdH4nA8/EqXCgqHHdGin7PG6gNpWqu50NG9vtYvS4xfPApeVWvObdRcP+6rz7PAbSf9fv

+y3+d0rAkG8n/CdxCLVL5OUduoyFP8wEA+lRO6vMZF++u/orycf7Ild1sMsOh+pW2KCNUF0Ut8fuOGyE

0hytiHN6qnq35RJR0Cb/x6bYT64DkUCx4Qp7FDGVAw
VLXzapGjpjbN7tgtp2+wQKESsfZ+a7uBZq/7LcejE+9YA4o4MNTYHHPnh/C3cpaC0Z6Jv9qzc0cSD9i8

IOBUYMtdH7ARNpHmmhY+3tHFDoj46ImToW9AifG0ZE5hUpiIz1zwKogdQ/PlcJ8728i0JourNOUTfrZ6

J9bIxfLp+gkyAhQsTSvRWWipyOgPllYZK5Bpn1B+PT
ZY2eGr9IonYdroEIkcfLYXliHvS1VawU0yMKBlNqBtC0nwFR4cjmO7xvi2+kvlwgI8DLvHgn8CWpo6b4

pwUncwR24XS2Fl76zvZ91W/gPgnup7Y37oJ8Ve3p4ZAW/v0IzvoGuV2hHIfYR23D2rybURC8gfVu9O/d

so29dL4UzL34YSCLK07jmnnn7hGI98wsQaMroyplnX
FWsbsf5skXeOyX0EMFCbwEOWzEtj63ptNmXNxFjTC7/pjPMZ+jr3JyaOmLDNHSc3pfzn5Hf3UxKwPsrj

Cd6hwMmFg0XsgmItLlHTsgnWCDRvgzflDuTFSthC2WM7IzKN48BnIsLkCUYPqp/3aSQoZz6WAHiV4Drx

IlZQce4gO2fGbrDEwY+6HLF3vKLMmMsghmXUvvv77R
UkdsCbM7NDBEUcO+QgUYQ3wA65n0eLkfLIswmjgsLv06kZmzq64pp48jQvcwFqia7hqOptrZWPJD5jkt

mXnLh/VsotjaOZA0OUSA1IYZAwhQdk8LtbxlQurdtQEYz+Crte5WcW1wbNzSpHQ3CHprbb2bdC8vdmsU

JKFcjxK23lurKg6wIZi2Zn56dFNrTQwJ0euUJFBujX
DqTE1DLaUF0cyxbxAkRkBFxYWmr2Y5Pu7gIGw2H7vaqMASc+SjXva2WmxVQx+T5VXl07DvjmAGWyD6BP

/nNwkRU/9hiqjYCq+4x9d/cjVH+ct92dZwkzIuHmLcR/6B4MCfVUCqlnhrPVCHxboFOlgQKYkrcEPjjm

X/WChbWhzbJEBYTr39FltM+DOc7Nf/8awqnL7A7ALM
fdtjfsnM++y/Mc1zdYKng7z3KQgqRsq2mN0uP36p+mB3nt/+Z6wviyLKUij2xqPSCk/0czDzFKt1egoL

Y/G6vl1P0IH19MU4X2YSZWC826QrCiKGvfBdjQ6cT/awQWa1l/VO6v4Icufj36lZqEZv/mOIsx0vSDPP

P+XG9XmIY7R/+jyCil4G8lm0pch6RTWiSA9YyhF3Qz
cVPkucmRmseDK9FUSGn0n+scmVjN7DrRePf6ei+cd4BYE2HI1c73bRunltr2DK9+4B4gzla4RGY9OFFC

0uctECdIs0HrHuFslv1RPHiy515xu9g9/Kqe9a/+h6gfmslGkuvWJ79rLy7xHVgFD/WJsuf/lpjzZFzj

NFh2G/KilGZoxduvxFjEaXlHZzg09Guzwd/QUjHEVF
EvX2g7YYLygMOdlxdnD6g3jzxdCqFBz6DAaP7I7Pwphkma4kaeWrHI2cVYy7KdwfHohEIAUKTccuTxVN

DyR6XtRjkTDQVBAjdb9lyV3ggGoiwgxVRCZfBCn7uD/aODgQv1vnbGSU8eO+IUfMS5EPxP0SwRum+ySB

1kN7s5HiAeUUNg0wSFAQ3wroUBC2YydaLBMQf12EQz
byR9OT1C/ndY5Z9sCf14s/zWhMDPzEr3/x3S8KLK7p6JtJUuQ8/fBzBEqCv30m7HXP2ev+v1qnzXNluV

5ywz7L8P6eFyxfCDsnEYYBkM063iCFsrvMOuDrQ7iunO97NPDut7WZjKAz1AXhFz21xQ6ss+xx60iO1C

7WUB4EPcUIqaSfIEW/GTizNWoWQevHXHjnOM7Vihtm
rkCJw/IyXq7GWcQDb24OGIXm0nuxazbVRHtLXlIzWwzhOgg4QIPNVduJwAF1+bpYuYNpOtArXBVrgey8

nIcnlWFSd6uUDqDj1JK88Kbt/5NtVcyxw1YeNlCwKGCUX3rbHL50l+MTTkF2My7dxdra+45cHrfRo1eG

nyOPg6L3ygvFJmLzjDMQFvxEkPABiU/tJTu23zafEg
tVjAvu59j7hm6ST/dxihedKlgtkNXvG4PUyj8tsqc256zn04jp7cx22uW082xWNL8EXjENpnOv05Y/Fp

ZeYAoHuLE19CNuRJNx2hZaShE4vpJcr/aG/ujJBuMzX5/dAOgfwJPmCLGYbgtjm8staSGA6l3O7hkZws

RbrjS6BadWO1GvUYlm3jxjz9p9RZ9fmzwZDhFquIhe
t071MuDf8sqxacXiJRRf0qIHyGxPwlr4xcAhuYkYL1FOPKdEfDSzRIZzYWADjF7rWNbLrTOoyXR1huTN

IyWYnZWwe9k+iLtgO6gpg0tvTnF/odpsyBo6kBms85YwxIOZuFEVWncaF0Wpe7gDftQsV/xNXJmbAyFM

8IzQryv5ObAg3xQ+X146d7D4/ise/3US+uOOorgTGq
pTVoRni+r/zp1V2OwVcmlwNK3bIIVfsDnTmaActUgJcIbJV1UcNxyF0fW038zElmEs+WEAHb0GjlxbJ5

IaVTk8XkhJ73sqkoZbICDfHVYqx9RA72oeGYUZOXkRlLZN1ewfaCScnIoijmWp5M39Wu2ISRqVoSXm47

ovBccWP5uHXpkCmQ2CwpyItL3fdQoM2ZGjeP5y9jDm
+Bm6Bdh1Ea4TX9pTGdgyrYHn9FPWluDGXLi+GZiPo7qCgZLdP/lClnkFEX/NsX9+qhN9H1wZMWc3+bP3

0fkdctaHJuDiEYTbeyr9uou1q5vuQQNdOEJM0qjgvNj1f0tZD53fy8FM1DjA2EogAVUQ4h1NKISs/opi

92I1wvpn/8c3qI8g9nsB7AaOvPIURUWAgZfM1yjDim
JCrpxPSute9HKp72w0ETnPlaMV8rAjbu6NGKg5RlSY5XEVaENa/Fqg0bvylXfEIk7GFwI4GV8lLr4Ppd

R8Q55Bv5FFMpt0bvfyDyuller1HLcLboHz70ws8d5jLFPcfYSUwf8r7SK+AMKY2BNgLqev7du6MsM0Zg

GSNdOXvIElWTQ1cBoWr0z22fXroUIwrwfG1caFhsGd
kUcJ6qOZP4OM3iLH9aelkZdP235rhAjWkXqZA7y0TcuAgZsENZ/hN5Dq/p0CFoWmCTVLhwzuW8t1u1HG

daEJRmyhc2w2wTMj9rTGj0b98uqJPjb45B2BfEu47PLSr/tsjfqzkIY04m27NFyhWFtjeCs2G1lMunPE

3OmairT8Nfl7xi5BDJkfzDWGe08uicb52ED1oO+r5/
RtNmywdojr51i+XiYfUZCaFvsTzanalHaEDyfARX2JzueUxLiYj2Tn0bEkNCkAgra7APvMiK/1nAkaUu

kxywqgnUsftz3+U2hGn3vLcj87dHvrP2gQqsjugNI1cAux+Bx+74712gXSId9Od6Lq1mibbrFGVgBT0Q

WNMIMWeO719a3C+yCOmGXjONfo7IGr7B5Tq1gsmSST
lkOv2UH4uIWkTCOTGEkTB5x0RKKQIvPFnLrodgVbcgT+LPei/y5z2hxnOazvcVUyQarL9J3I33m528lL

pHyYhns+WouuBnSpp13jmZsI38koAIuWrNU8AZV9Jb0stPEeQCTjr1Thq09c+vKJQWoKlrIrpzYD6v7z

aRexDwYMClkHpDb4jQzQ0cN9axbUDw3nB4nLuIno9Q
tBGJKrqN1cRzqUK4KTFaa2qiFIEjMHvd7JeqfJj9wnc5gwjTbW96bW94NVf6wNgXlUS5Cktd+dsVTio8

Z1KEKHFp4Vx/2+24UJFShBoeFrEz346ZC0M5TDRS8vNXQrO9AHGZtFIN9prOS6Kt8rh/vySHJV8midSp

2/oYBYougfLdpO1V21xMeYPIaLztn4YsV6Sp8A1z2B
Fk6gOrvVXNd8/q9rczR3HmW8yZVDH1pEyVxRM0gMKsIHFU/P5FbRGRinb7z9yfB0G8Oqv1rKvhc7UzXx

ASU/K8Zl2SBn9SGdgvU73qyQxvHU7ke978+DMM7HufxSmoPpa+R98VLbOHcQ5iRcVwBgXnj/2KF0xPi6

QbXJULk/hhpFT+Ttoi0X6iAipeT/54/SFeCLIzz9Gd
wcamftkMUCNo4nLe6IWbMF+8sf+Y8p3FOzlqGPwUpTQxPxxKeMpClhdHrsUXknWnQrJPcFU5R3SoD7a/

h75t1DpE6heYshwY3rx/J9LFwN4a7DGb9Uea+VbKm1k76aQV++cKnURFsL8n8A2Wml/43LOvlqngi8rB

vnxY8fmLztXxus9b+SVepazo2J46S41O+H7s2FRMhc
/yn8LQSEtWCct1DUFoRZ+P8UG/D7XlXbN3HCb8G/EwJRSex5BDqb6baKQcV2fhkVWWyyvXMmDrbwf0IB

kLb9Cdu3A5LmKQKxfmj4rqqQ3+Ojv1xsFOoURix2u6dgThTY3XY9L9n2BVim5Y4sXAK2X3Wz+TrN71lX

aX65kzZujcAqUBOzLxC/3+e6ty/WHyx0+fmNDLE/rj
7HiFf3FKuV7qVmcxXVOuM45xHI/5+QlVB/SmTjS2EFFnMxWpReJNQ26AoA6FBCCpQ6uU++gBkBGF8zw7

7MUBOuCYiunvBpguh8+yvl/iZvO37M5O3J43wAyS9v4zTGnrhqaELEUXSspi5Wf91EHa+p4KHXftDemr

kMViIQcDvaLVSw82QjhlZ+eIVyDMS4PVDrYSj0Es/p
UkOBmMzwO4y6Aqe95TU0gm5RvvK2QpzpD8Tu5H76byVEDvAPWOssXBDa1nHRX1WhWKKcnPjYz/BPSMtB

ReMJCu4vznPwlw/CUND/Eg0cpoKV1mBsLGpB0S2gAS7wG4QCiCq+DkoR0AVwKKqbzg4zbGjd0f/XjMMr

v6UncZaSu6AUlXz/yTpIfyJiDqVJqBoD83bcdPm9OZ
Q6EDHC81dnTqxDhzrWMeFhsF84ANmchn5L1gYxhICiEyi9jv5ySUEsP2Vdvq60H/reXReJtwAvG/B+KA

j7zLTV+lLMYkQUwtUu6+zqtbZ5p3iISNjbN5DPNrtE4X2dtl6kpd5+4RX/lrkgVzhXpuH2rIeHmxXGZM

edZNIMvnzujgB+VgCXFFtNi6/1tYZNQBGt9QpSaVBo
I/+4Ly1W4t8TS+2bLBMeB9FGpDDBVjiRUklCm0wZS9iaYUTJpKZMlo/YusdZGzw3Ap4Mi3kQiIGHXnN7

uSbbAddk+9/Gj6z93GEEzHpVRj/m2cRh7qZsKtU76a0gXzXl2e8DdULXcC7BjWe1CBzoB5IkwiFXc1sY

cA2RApLvFCQbXiNBL21AlzpaUTmRCbWol6yGlI3nIr
/NYb6CyxxUiYdw5+9N6WHuQnZ0WyUJ9KfPorMSbv5dCOjoO+uiyMEDp31W62y+BYVVi1hJkEOFtMGQ0b

MWU3LZ8SiJpT392rg7+Cwdjs3PX4mgsvqpf1tfafMcn7+WXUbB/To7MzQLJr9DPmFFx/MFjzgHBgXE3w

NYpxCqY9QLsf9kPh3LOkWMt8i9pz+mM7IO9OAvnQYW
6uz1IPKyjhnCKX4gSsjEfUlrGdGqBpG8w8/+qlZ8grxdIYv6Eq54J+/rpRO9RFSa7zihx7mXwks0Xcii

2/d5cqOSfWrn5Bj3elTib8GKkKc6Pez+GpAm6dZJrFAmfpiCbx5HrELtdR9MxmUr0f6sqtddWYo95Dnf

hwL8jJx0EHVoA/71IJpwcG93oYDXjM1rkFI+veAqv3
WvUSoQ9JSIPN2LpmJ8oM7s43RZc0Dhdl/VeNFkGPuJFwqzG551PRUvsEO+7n9pWIsca7fMmM5zmvJliY

lfG/f8lXjD3+72rgT5f7yjfnCxtPU07J7h6xjcjVlh2+dYoNkyGyhVzhFyzwEQTqgauK6r7okOc7sri1

9tClrQUh8uIK2d1D6n65wbSFdSy+x2DbhasygDVVI9
xQx/sbloit24jX23b7z+SApsI3oRU+Qmul7VW/M2dEKlTGxM4L+Mx+cuA0a6tzvDbe04cfcvSwJhgWwP

VsWCDZePEd/6+q5dbp+n59xweo8VoMlrxZVYw/mcF81M8RC18idizpEXcG6i1IFTFPsx1ZPxJNfgg8H3

uUWE4rR83aEsEVd10VQcJFe0hUKYMDIyBrmYbVEoAR
Qco5+mkw+MspZiDh8ZrkI85W2/MJqlIUUBFG8hCDV1OtjYtxA/8oIDx3L0ZfzQfEL3V0lzOxC8gxU0gr

FkTtl7lc7JlX6w38YxDh0ZgXI1AxCu0t4MKWaNIxmekK6sRf3jVjOu47QGKl4BREQL701tG0KBv8YFim

I7X3+i1a+PYJXPtJVO/MqjJoUxCKB8okD28o6OQTcC
8pGmbkKTCQEsZ2eUGSA5Z9f+6NL7MvYmYywh3tMHC8xmXlDJPMvoSRibmHBIlaH7M8AdsYdzY8DBa99F

Wyl80O74wLBj6NcOc1GNgNJ2agHWX28cZRWP8fOKm0bfo5XyE93A2kKkR41Wn4CL6i9dQ+owahSw8Dtg

hxTKjZTWq9uivGk2G+4IHuz6kmUf3Ihm8kqvMUfGQ2
5hhUcs/v1D8Eame+7miILyjt/VI0Cz/lmQ/2cMSN6oUs51RLFB+UmZn00rX6vP9yBJFGJlq2JQEhPWLl

FTNFiQpJ7LaQnLnvBHknhp6MO4nJT5o37SrzcA735tkDvGU3aLPj8nb+2pS+Al1t2dfoc/CKwl3/zMUH

OpiiyuXgxjyhDBCRI4DE7V13F9CIW/lrYX9m+1+fxd
do/iTR+r7R6/hP9nmKhHaRcXj59E6rlIcc4X3Z2ceE3UZ5JZs0xXl7Hgjr3RBCJG2ro79J6MNE7YwLlt

N/hmnAzd+tm+aG6MiPBbZfLaCZKmuWl8nMAa1FnAu0Tmsv2XXV+A43y7rJyym+u/TuXaqUOoANKHwHU2

SRBPhe0I2o1DpJn1QqsIbXggOK2dKMlru7yrNIhtGc
l0x683XUttYmzrpZCFG+SNtTjUN26m/SMvSjUdlPG8fMGHZraEeHuFLdA6AA6eg1JiBY3XtNs5Cs/MhS

nyODWnbreBRPSDXM/eLl415EoWqKSiurDh3o2NSeApd/soSqppNkyi5PzzQN4pf49iKudVPdR9lNqnHI

iiG7Kpb3mEqMK6FdT6DjR7McsoiqAZ2mwaiXwN9JIe
UWZ48u6LgsaWmDojGkcFo7IMCXIkBd4BGP6PpXW5yYAQgqN76GNjAfPb2DVuwnppAJh91RUgaujYgH6x

gaidRGdNctbn3KZhJ0EKA4XHxLvTaT5tnvP6c3VfDKC3y5zdz9DefthSBwKRbz157t/+8CHiU0fUiJHP

118q005cPHKWx1hQvrsI4tKiIcPoFem+Ly8Py9W8DE
iGaPIOJMxl73K0hdOa0Z/MzLkRLnqQ4flWfjpXFQCZkgZ4udswPsIkC4I1aS4na5ShEWImfFfSog1iba

/Z23UiAgA78TRDnkUkajaAjhZwYER3UU7zOF/hOkJkzI2wGJB7y9TxFtDdlAzpwiCtpGvKvJqfF+v3aj

SgawX2bUKa46v+qkTFA0ZE1fA7jvgIkO2xTdQ7YqXi
7GUIMaHnH8aFfFTf5PePxOpngTydLN7D2pKeeJYoPHJqtvsrIM0f1s7zvJBDobv46ZcpUYz+U+1mXOi7

gCIyp1BnlkRzhRviYBpbwqgKXNGbT6My1fczpqSkaxBZ61Vyhow8XE20fHmD/PEYpHuReuU93ce39OjV

AZSCVgn4eEGE+5uuipx2mEQFHah4vZcGe4vE8qJKqL
dbAKn0ZJ8vBAwBSFtCvbMG4Zojuwzi4rgzvzrZkTQcN8Mv6Uo8hpcHUCHF6xLu7lOD/w4oXyC5m0NF8G

7YXGT5npXDnmn2z2NAxG+Ad1rzfJDvJieLeitkC2S7ISEdC61/srSuxmg84RlqmnGcfYYyZFcDNWxctG

yh0oW8yYhXU+7c3q4B+L2xyiVWQw/BK+cc3QBq3+kG
g2Dpj0KFMHaE4FkQwTXwYmimFWsxaqWLI3cJoPxV19qoVf2AOQ9ohQDVUWQu5aVnbH4drFOWpEB70Azh

irj80Cz5YvHc0RkYjVOHT5e+G8X0efRQWrxclVadrDCzyN5lmvOS9oI3op6/ZgmqGuDegLlr97exkGYF

8Kb8b8eF9VGE0x7qnHHCrXMLFXnVZ/sZxP1RGtM8CW
+FTGXujynN8yWsuHRRPxr17/xmnhFjHx53JWoiwFKxPTOp4o8Gr0/yunqDqRIWWQsvghBxeaaST9Y2+6

ZgARteXTQBncmHnFyh0chAAtxEGYMh/eg3twFrv+wO8UPCZ7utnHOvlxuvrnOyL6CRUnY2CHOM4p0xgV

npc9j7Gxof4GU+UyuDdnFJPcB5JfgvJl42ee5b0OOs
dbuQ38nsMAK/fMdD24A0G6tM22xCQy+dUA2QgIC85UL6VFPKsQ/1KP3TJ6ote06Cpa940dPO/2SVDynV

ajgaZ9wJ5V5T3ioYPwIfuXzF8oLK07LTafBEIB+Ng4GKiO9iGqbA5t1vlUj//HBkd42gXtregD/GKcG9

yMMtIIde/940r8CG6uM4s5z9E+thoei3ID/UAHPSKM
zhkPlskx+LH+18rt9CgI7f7HKaK0F5BGBRth6DEap9hlaHMpV8CxqTPxuo+PDR7U/OV/YGnTid4wNmz0

LLBAmiNRaXYF1VTbT7FCd5wLB6njCQj1aKYK4rSpmUpNmG4sMLR278AAU7BmybvMSgyg6WEUhwDQswIq

/li+KGF3sBQM1+CFdspZ2URd/NaqFLofpwHHwtMM27
9C76odWUNE4tHk2jrUY49lFG5sJ83wkFLoNh2jO2l7e8xml3bI2GC1JfNXP0HeuYp6RWGAUw6J6k0ajf

YeY3QltCMqBlGkfvkY/I2cqMlydbVAvDBpljVLvkWBi+b2QO1f0kOuVSTk6aQwSlWD0YsiMN4k+iv0O3

KNKdoW562A1/IQwxbZfYAMYxevasDK0fC+ZTG2hrze
0Fjck5s5UI2GDToWRRApPKKKee45P2GPcQvneNBwqpe1WRdm1khgt/jrkcO+GAlE5oxuCX1S041G8dGz

gcQhDV+xOqtYgyUS2mbPy9gg7MiBa66ryF9Ce/VZmywlAZDVQIw2ZmnoZpLp3d3OELPD68zpxTI6klwJ

2Xf+bPS/bcu+QOD/sq22gv/dcqn1UVn61MjguyQ+WG
mhN9c9PvrtgPH3cheKgFMNN3doIDhrRsDGru/W8mXrkyyTA21eRwkU34kflYTEHR78PtCKn3cNSW+hhk

Wzr4k1J23VSozJAQXRSVe1qeJ3fYpVnLWMkBzhT0agzyn6Csq3WQGnkrxuiBGSSoR/CF8eTYeJVpg4rB

vR2abPAMfFYkZ+O5+ZsoZYDjIQvmBS6fq//25xVCon
zQHK7HIwmnYrlp9c3aIIzlahGVI+Ykn0OPQ/7eNbompmrnnct7hy+Xv0wTPnHgKGZeceBQsPug34vM9S

BAhsqPl5DZfCIFmzoEhvHU2/KICWyVXeZGMa67kLPLr7zR75c4VA6NkJ95fgUUJQOwQBfsLCICvXqfEy

8pEaVWkqS5D2DiRJTDke3Ew+RwehYeJDi8Xj0NLwmF
qEJPa4QBosMG2nKou7NowAzYKWDIG7O3Qz9sqlu2Hqm0/nrZsYSkmDhIA7WgA3aXll4LjwAfPBvy/fUV

mkRvJ0zVPEjQx3WTkaVZt1Ih5Q9l707p2yGPY0pqgWiEjDEasgtqOV4CkIgqjbbciUCniefiRpJuPdT9

7w58SdL1mVc9UKq/BSoFd66RAoR+138VzxO/vX9nm+
fT7+04FkvJJv5WCHq+/jG5MMXjT9L+4FJTaP0dENplWLxO0yMILXxUvQxVfyFEqLVqcB4Ghd7m0uS5Lq

BicJ2SZuawixk91J9oVgpGE+vbOcOHBxt/QKkAv5bWdEo12n08CfU/rf68st3thqVFvmw2jb2P0HaU7r

HtVhf1n1gtQKyw15ed48ZdsDfA366smlhYhH0AdT3x
lCCYPS/DKz5Y/WqmzJPDpUm30YEeQJ6ydz78ThubDHtyPwWjPYVQFxagIY92Bh1SxamMV6TPDp6er6cu

8gPBOKT5WB+W5URvt+xjYlpg9R54MPRhv9wdNPdckYlSOyXcNNZc9YCNgUkuCJm9cUOpp10EkRGr5TY+

n3WisJr2sGdiZV7ZYXKKu3XNaXlSlCHCJX32hNKb5C
RZjY4qKPnByiDSr1pvqsLl/ihXZG28dIlmZAQlIyJDKdVEwCbiM2NDee7EOSHTe7SM2zcIMAwMFBnxjB

E4W/BPT+4ha3bqef0xoCB5nRBtcJyDhepBSgnsf3u2Gx+E1bhkSdwHzyOx1AMRLcwqYNKB3qOPB1mjfx

LmuHdtNGq9TBa2sJLkxvF/rsgR+riORyXYbMBYr9ED
lyzEenUL2Ach2oZ3q0zF6Z9T4QsqeUEe5rNk1nEYfGC/XMQTgz6Rcgv8MgHrxXNx9B0kieXBgqbqAU4F

+OC9OOvDddXZFaOWq8cfeOSUJdg4td7s+2FgvCpRRSFwVAEg49jymu0hHjvobvGnhKHLa4+ukmHOW8JS

q27zYYAz6z1iwIVlncts7hFujZ6iO4I255Aa9P6g5x
mDOMtWZNhFn6V+lhSG3ThzOpjalZ0z0+PTgSk/GmddZkXF8IJTlj6XCGpjjq9AhwwCVgYXrnk/Ry8qek

yDM20ga9EtIwD31Sc0j/BJYRlgEMqw3UIiNGHHfKqlfhf+MKTcC4J/pXg9IDSff078/NmusZ4FtS1OXO

vRKTR6efdK+ybMbworh42mvJL4qQcqpQ2eFvAtd7UF
D9i6T6iRDOo32/wNMKx6YL+6fUD0PXcQ+O3mmCrZiA2tlIuRThpndBHsVHqF70skoMrCXA7431+/MEJy

vQ52AfjAiTz/0rEiXPyTIUNBcnFZ/Cz1WZyTO3K1Y3/JTWZW3vynPTLMgSMvEGy1JiDCOBPqxZmG5S//

zkhcLQWHn1+YRM4j7oIbjp6t2GSw/vpjFKh1Yjpxs/
CpGh8LuClouxL0N9wCw+SgN5Ze2+XiUC5UhADh1quRjyNw+9F87jP99cf1szK4cERuSqrU9/3ZnxL+Zp

Ji6ffz3jaWf9CsAvl2SiHAwpslkC2Kk8Y3qlXQw0bEOmruzFBh8Tacucpox0aImO5DZ1kqdntbmJQDBX

W5azDn0afY/42qQgiyINjQLYXhu3ZVu2zF80zU8JNI
nfmg20RPEIrCfnDQieLHWBb2V8aZgJlhJgrgZQtURG+NHX0oInnps3jl3vSrD/bvLarALfCoXv+yJPx8

VpfwUQj2vGkcjW1BRPZU3XVGHiXfjl6RIBqgK90qZQfIi47Sscv06sN2I3wlK2j4zh76bgGw+ptndPTl

PGvRuZ+sSrolzutnRNLS1M/l9IuhM+LlE2ZAvUjMjz
mWyZECwnVHwk8xR3rb/VUvZvX8LsEtQu5z9mOYfy6K9yz1HBQ/Ks7dp7L9jdgIhY3joLlALqhMlhvF3p

WJD7V5tLbdtcCVWMWGoWeMWqnqckZn8o3/0M5N32+dQEb60ITb+h4NqQBILpPY1LhMY2SPpl4/yx8oMT

hFWWQJqZYNIYSV88kr0ye7tPIquD5/eVnrqYFVTjX9
E+XkeKM5zlpsdtyAiqGvu8+C/ArzEaxxirkSSVYmLyilGvxT2HvtUEcI11cBnHumG1Seu/Hz0sL48KAo

QD7nO4mrNJB5YU7k19Qj/hqYmpm4d2wwmDI/Nsp6HtJRpGRvklg8Zq5ysQnKhPZKyZzSoVtrymuwYqMy

2TLrA8izvyt82avsLDJ04Wh46nYnB21m8dN+PQO8l9
xXjGURzuXVgplzvS8qZcE27UU4VzCAu82LwfT+5gIVeiexYbJxGARdJmeKfmuT9O2SaHxdHUT8q6D28z

JCaMvu488AJjcPzbdu1w/XtgG3vdHfVf+oYEzh8HZ/mkep09k0U9vpIrCfckl5yV1VdW46MJMlsY3F1g

gG8rukkxivydRlhxoDgZ2Fd18d87JDCknRCXiqtqPQ
/uJKIjVQRqK+HFsEiAYMIcIuu14+T3fZ8/Pe3BcO90KYfnMLe+eZD8aGdtZ0/iNoTeKe5sFdi1CpzuAU

fX9gvhmfhSuiuvwRTqtpX8QFDXFNySjvRRR0B4vxaKxZd0A+ly/34Y9e6dOb8GITyBBBkwrmOnw3AS6a

+R5sbnisDQH4BbqrzZfSj4cci1RU+6E7aqzzYiW17w
d6z0bfp5ccvxgUsBKj96GlGdD9mOuOQImw8Fu4ALOhhxvOrzooJfUufg8HlUu0/+fWSzU2qH5vSNIDWZ

My/xsN/HDerLwS9E7doD8+StRUhhNu2MKaPR/qhuCd9C1B5RbjaBJl9i1ySJEngmBFqVKC837H4OS8Ca

XLhm2jjROG8e/gjIaY2m2qroJ+FVZKtC3lJw2XlDMx
fHdWq08nbDeUcgect/WH4zCKFSRu/SAUhxmRhK2B58EWt5JmzTfGkUH0bEB/ui92EOUYSfvV87K3UD0F

kbB7p+Pkv/xXJn+YEml8C/EL1XGzQwuyu8hccp2Be7TtxSs2QimoSSwxJrJVg12L8SxX+ywGSzVZIAX/

929ILZKjcF5bvOiMG+itSWwZChhTujmuI99g0xk87O
5/KjeQJNv7x1VMYGLgkXyfezhlO2kzNpccGrrDe4moTVukBn6cLBHmyxhINTmHWf8P4snekqmcVAQC2/

ivtZiq3Nut3TpSG+qVe6VmuCHnZPcefA3knOuddoREzehdkT71emKsdxdyhUndc3j5y6RjmVbf3dZTV8

/SQBnWeDO9cbZwg5vrKHiOIdZuB5akWzr8VOsLNDJe
Y5px8a/4IPoeMzP8Rn1jRV2S+z9Qcn9ykFcCorKkdZEnwmaIKIu0dHkxhXpVPiHI99ZhwEanfTpG8h4F

aGqexQgjTQYpc27BUdw93S6Ye/DbdVmv+UTg6Kw8nRNc3zm2l9WhL7RY/wMkLNyVzhL6/u15Muap3tdP

cZYKT6E9kXJti1n/XB8KmCy7KCe5fqqwhTiD47EVQu
FIv4G0Iz/HhFOgbO3zKaPoc0Xnqy+4zsPHec2Qnxpncx71bLUOrBaVP2qLmkhDIGIXFrHcSDci4qnmB3

7PHzkImMuV/Aqf8k+q1PEWZ5LI2X6kjLpXR2RU32zkv5wMm0YXUYPujoI/iQpgLlhuf/Hm0XgdSpA3iA

9wyUGkLMj6ItgXpcgjBGhOpFa8hmEz+v3/VeoGxRJV
oKj65LtB6UGfMv9zWVNgfg0wDmNm/2MKWV41K1Yr5zG/UOHt2eqgBYOxoilL1A50fXDmxopcrf+pDp7/

y05Bg8N1DrcUAfL4eU8YCg+6Mf5fVG7Q/wt79sc5SHk6nXz4X3u2URsqZ+q3SI2KuML6vCBMXwklPdpW

pVOS/QfmdavUbtPzgbXKJsRb5MYfEbSYRIAtRMhv51
bOOLKGSK9DR/j9tLins+L7ilcUsetm7+KL7P3paxIRyA0xRnAoiUY96JVwUbrhYydSvVLUMUWTlYeO0R

rOEpQzCGpdrZnusqZFJjzkqPk/n5xWiuKmjlMRt3MW/CDI5CkprZGRtxAymjMuHafMPJic1cLRcPGX1N

tdPHz33SyWpEEJ0Ai6zqPxwInTOMsGXYME4kAt9KFD
MLnK6Nf4bUzEtpcgaDvZTnrxJbi4dvr5dWxgel2GLPvZjpRYw1ker4NJ7ASw0MZiBorXZHlmqzSIH/b7

FHJuHfQWMG5ckkJ95P7C5+qEcrlq1UuoMAS+y2Wmla412JlYzefhM9OcuYX9zMA97zM1g/DQfTXaXLh+

8Inpy/PK5fDj3EzxvY1/tX/h9zN5FDuXrCWrtsOt/6
0R2FABR0wyADWxLhTiRBCFVEISIrgfKWLjgbM8PnPZZDpcUmX80cDBbr3snbFH4qNmWxdlz3/M6f3yPT

32GmrFFiY5fV8ZSYIb6RMnz0YpnhwjZ8WCNBcBEd3vcZoqsfs6IQyqbkDYc4Ps1dl2nE9ZbPYjyjWUEX

J10JwZmX+kEQCt10KvYEoiCCzoME26zgCcbWgrZNSY
9LhzXGZFjNUXeNIDSnH2HFE9KOkpPLi+/c85rsgu4sjDmyHqDYz0su90pprJGGEWJoj3Wa2wn3Tp0W5P

WOrQC0ec+olSW5dHCYzW06Xpe8/9f/x8RN3ezpDxpZIM3GWNVSXwA6aS9bS7dWiKZmLpUmH1sXXjTv/h

rjtwWtx6MVYs8l/GTP1KFsY2WxUnXY3v9aNnl5+Vkc
NKRo9F8Ov/qbFE+zd100jRc0PDurT9zbYPtikXc0TLuYprog1W0N6aWIofZqGbkCtbe6LjyO1TLdUBs/

F3N/SACG2GO5ft4rIXS9US7M4Z0mQqJOWLzCPDRmnlKhR+6EZDV9QsO1G09pvQZNdQoyKQdVC/893r3f

b4/lsXOfCs81D6XmSe5RA5CwC20dQ7vHsqauxqBa6d
Dr9HcEQX77w1P08nHunVND+U2aNUYKkpnv8t03BTMcEDYSnSTQRHgOIic6iygSq58uSNKLWLyrua39SY

aVbn8t35o69Mj43S47hTxyF/RO/2q0yvZbZM9pfPtlRe3oc/rjziMu3POAz+Zxpc9s+zoitd3XWfpXNE

6hQUlbsJvrFDanEh/cwMjwYea99kh/DQD5gDg/fyzr
//SMicQyCLTqHbklY9QGdHzusKA8FHW4HVSonEjHYF6Ch1JVHjp8WTF9DZZfTY+vNWwBXf4D8l4LKseZ

Sh6/Kfuj6BEpkDKlcvl3Nkuir+z5d/VXzql/78nzjkuvD70IxHZ4jFRhF+fHdx94w4I09rJ6S7v4VQQ/

CYTNBUXxvrswi6SJW0L7FcDfRu9rwy/8p5fXLUQWXA
uWxG8dgZIDVf1pwWVmVStkFZY3qLqmdLmZxQ9B8PPGjdTkzj0eWf4cY7ceBiToMgOMJxkEFJe6PAwId9

sZvbkNamINeIQdhJ7gkeymQqnPnA/O549m3DYTQovUFixG0EcgVpeg5dlNCYx8Xy3kxWRR7+4bRExB5f

Dx7IYMxYXbu4/ExkL7EgY9roYXpFR++zPWU3FXh0gm
CDvtdw4FY6H8Is3HXjenUPWrUr2tC4i+HeNcpgXyQSamH/TzGWEeKlt7VMvcx3QxT4VR/geiwKuTV42X

Ogf6hvBetJ80IMRWjAbSccH9vihPfV+yYjM6LX5G/yohvwVI/3K7/FWTMNoa/c8fA/v3O4ArQ6qmd5T8

9rB2IEI7S0qnvHL/2ZM+0DXDutXpH2ZRGFi38m+uEa
XDZ6Izkj/Pip2Uw7nqitxJGuKN09jmQC0kAkCa8fb/A23hejdn8DVPwFimuiDcblttVbxEqRgun5lFzS

zEKxlytvXc80wWuBgaCHNJn+/41BnXPY6235xTAU4DoRxX4MH43x191rCMJpx/Ld+SztIBlwTE2sM9L9

DzUdT/3G3W4OIkeWs4wO2DGMvi0gsSQ745kuApOcg1
8rTOGsQ3NpeBAA5cXRCQE3+XqlZkNIZ8U9jtK172pDMBVW2zKMnq6sy8slA2ZUbsN9Ce9DebEu6E8niA

SCtNfzSP1S1x1a/4xNkXiGG8RzXW7b6uoKCk2dz4RK/Ipqxlq9xTcgERdlGYy9WtoXY/a1S4ztoe2HNN

Mb8S95VzLtvjlSCYFY3S8AkVk0Kuj/8NDJ1STYnJFk
YX1Lcipcvq8ZMt3/Bq+Bewuvlkz3iRwYA5vWSiGQKrPmOqQkL/YOQF7rkz0K2/kmxMaJf3e2aN4y9wS0

1nqtFK5E0QxEe5+9o1ivBRnzAmLUal5b1XDyjJUr1ERoCDa6YE5F7o/f9L9NjJGxJkZ8QMKqltop8bO/

QUwNhVJrm4Dx2i70zMFGvdJu873fqTU37JAsQU82Nj
ziRzxJg5ckxXAzZucy/NWD2Q5QSaMJBlwvtPCYVHK60Hh/QLgevsrWJ7TsZWjUQSc30+9cf2iftY33Br

idhCzKkgUVKO0VyiPeZk6CQiNar66Zd6TXCYfVdoSqnuQ+sNf/96zRa/YYh81indf6+/k3IRpEZbJ1WG

CV/DgzhgLaC6M87cgiI9NpppS9gsVMVC4Mg0pVeHUP
Bw63JqxqY6DEHGma72zXn1C7DX7yJYEBtETy3Xhhqc7tKzvRr+zXQOOOnSHysL+BErsR8Q1Ut7BzagWw

fcwwXwJ3jA9C/XEV/ne94oIDzlZ2U5/3p50R2mV9qgmFlC5Bg+wqn5ihmPvZHl0IaJmnROS51oCrSB1N

3kwqrn6aLiUtbBuyIT5CEQ5xASvBQMnAHtinMOswVW
6YwXL9N70EbD7MGONBoz0BlQsHWmQwAcXo0M7HsuAHitLX/v/iq7Hl5z9BilHqoXPWdRQbqMj8PALYFL

wHMxtedyDdp9UR+7UdNnINWlCfOpKHsmnCa7M3rNvLe2FdGtN/2Bm1696LPwC6biM8napoWoTySRiixx

S6cYe6iLFvhOJs46Yy+Cc4ZLphrH8AHg7L0PItODr5
45ytzCPjCLukz0akLwWbtxhNr5cdPUeOvM9S2wgKmXtVGmlNNhHAyTu11b3VDDnQzLHnZH/241qlQ/zY

bqOVsC6C1Az7aAVDBg0wEV5kATIC+KGClatELf6PeCIi0d5y60hpAeTW8hjtMr88gZW9asuYEQpqK3Oe

fNq5GUAId1e0ePOvdNMI9BuJSi2IZHzXs2aIa3ge2s
alek75X+R+68wi9epF+nIzGjNSUmyj46QDlBB8DYR/FCl1m5wC3yRwzEbrQsAaMz5uoOSN3nhkkPnWpK

sdpS23QYWIhdEpN8JraCfI0d5oyoAry3eH9Trm5zHGaelh3ScHn7XFdTGRYuOc/jZSTKiSWkVblkNoxZ

6Ilp0TZXO4VgUAdChG54ar/rq9RSi2Agz7IFyTD2Zv
LtD8gxtnwJwmy/2YL89MsXLovOfE68/rgI5BcG7E0uM6E/WSxr9Eb+ySo/xplpq4+NszFc5idPBFD6su

uK1jO7TVqrKr5tUOaEqwTYXrYlZqAD1FvwJBu7k4dtJqtYaxJd79v+RmrBVDk7sBiQ9FGZ20hKlE5A62

fJXOMmgLWwQJ1x287tVNdlQ5o1IVloC90mUljmCFVo
nVJ6Ex5uNoe7x6MN1XFJxkx786AlajJwKgJcF2LBHC+dykFfw3dcFEF6fzGxlNIm34/PAalvp6tnEejB

tFz+uXjW/TRes0CiOu31gZqRRxcocLhh8igAO932zMz+PVz2o6Dmikev1HBXWpGMqnIZ9U123E8tJ8WM

Kg52HVghhjFjUQMDwjcHIvBjzXFHt1DktKJmM4pBlo
zht4K+Di6HiEAR6qCc/EktU0ZRRk77TKJMvC/izDYwdj1iJf5tfogYpkHnujeslqkJeMcf/rZyQq5gHj

xjlndpO6ZD1xf4hopMG5GNJ9nYNmwrfAEdAbgfescrgq1k9Q9R1T4WfQqWxFODgpPLwx9AITG67iW5k0

ddIsS6zGQzyt5r5+0QbTk5FmilPD+Yb0FkInDeaJPk
7KzYW/h2VrA7ogeMfX88LO13WRmeg0Q4GgMWks2ygwx3RF+4KAdPbkSVazsZzT77ouEUSjhaXCLAAqRd

OqPyxL/wfRkhpnCe4TmqMz1H+jZDqv/6wUxbsa3A2zpxqZmU+vjPde5nrVVqwZWtzaag0syYpMH7dL4h

aXFILN2DDpx0mrHuS8lLW7ndIJ2Zg6Ulidbhq5TQfC
sk4LX0fbYHKL8CcucyBI6/4g88//jpAdlMve5QbrB5qEU1LgwNA7DWwdnRcCrNdmmagHzI+Vxi9BcE7m

r+Z3H1TWAxoffueDyz9AhLAJsi4PYxySP3pPKvH8JttOuc5CDzY2VvKwqWawkW+yvMWFHNdV6lJnKuHq

GOGowp242VdSoFFBnwo53YIRVo4An1TEv143aI/ujV
zdSR/Sw/VKqFvC9NPK69u1R2/u9NVERUri9Oeam7//oADc11fRvRLTNMR8tVr4u0QwnrQrogZkIDF9BI

WuUaHHyxVyiMMMtx2wg4WxB9oHZb6dnudXtiF+z87dnv5NLDBsl8bVeTRIDswc1aSXYfKQc+D9cHjho6

BrbULo2mTCJ6VlGjiBFLpipeRT3VUfkxs+O9Uq6zDr
ok8sdzA6aky/teq8RMpLyvlI7auti9gZg5eXZZwV1NR3Flu2tlsbFie4S6DELKn+vXuYip4UAtg1FjbP

OVIpK5JqyQ0/yF3Q+69bnfD0IHe/z5tE7+zdABjEIWmTKJbCLng6WJklNv6gUnmV2jXsANQpNTQKI5bG

zfULd7jBQCz2iEgvsqpzQixhLejdWGakmv+zEDZvza
+fdwuOwkFq7qHUV6OXUxZIjn+jAOwht/klKK2MkwbfSH5rWd2Hsdi+DEHc9ywtWZmpa4wNnAAkLKpMmi

hJliPsTqHPe4soreUZ0CaaanWaXCAEipVj5WLVB0y98ApwPiPTzK02F1r8C7WZYdF0sgFzA3y0ftGQgh

UfqxRajtAJtOjyBIPxz42Rls5LPNkfEEKYsJsB7E8J
M4JRaOGA5m2j7eMmVzCtavw7kncpt9pHeN/KiKfFHC/qiAZMk75WK4sK0Ptiu2YPRMwbxvuwbv8w1FEj

KNXT3MwZaV/ObAIzjxuhe3rKgfakX6noDI7yvkju6lHTGG93v2DoYPS7J4v3czwY7mwGV+dL2eT4kTDz

ZqqMA6WdRzElKMIn1zun2guODdQSXWUZc3LRLNndqY
8XfWvPTtl5zpSOoV3K3Rj8JWf7imUxVIuN4/xaH8JYwb+ZKU2Q8R/013hg/QeoNwgmUXgBslpW9lHVZm

p20cjzcInRzGyhG12khJ9/SgsvxUuZwNJyWTMYuvGxJ/z4txhfwk+DiVIR1jC73VFn1uabEyErMZ3jQh

Y3Ci5TPc/DqRevfffMbBLwn/nmllPrcNJ73ysvgcqi
3RtbPDJsppRCXAv/jmiuqpSgTvGfuKrPs+JQ0tAzq5xtAbNtnN6NMaAISDofzUBOU9TahmZu95QSLMEQ

F2K7kEF1qrBaFxLA7WQnaHwtmxvUTv4s/ecm9u4oHSXgxom4hif1rcKkkqLPYBVbE3E8O2MqMMO5rmuz

bNm1RWlXNvHC8oIi5TqxJmzU4mQv8U9Go/3EoRPy8S
Q6ksLj9IfYlfZ64v8fDaS2bRjkCnDFTH5ehw5mfPIB6MAkaLcocmk1M67op99htxt3TNBOxuqbQJEJ9T

KR3bvEhd6dMWvWSO2ugvOVhsg/Udqoto+ih1Yf9sbW3ynGSgjUNG4EbDS5BeFzqYVoU/t3bMM9DP6n7B

JSvmJPFJWHBmQ6lNDjQE/Jy4iiH+4PN9aWrEffQeYP
ArCKVd2LMEovhQjFtN2iUwMtoTQFS1zHreV2xm/7qEAqT6VpMpGZk0OfNVNJ6yxKwo7VEfjNAXuc4mF+

tr6bilN/6qYsFt4Z9hDkSmqYYdlKw2WEvWVONsfCdhAnrIZdfRM8iuG15ewgvnHqO+6MIKdwx5ppatmn

Ivj3qwxYWgJ3OF0sz4k+27LUh/Kk+medERacGOd5EX
WHoU541y/v1zbR3oDgO2kd4A620f9xMq5vBGPbANAkJOk95Ctjnv8BM2PI5ZtrBqomZ2aQ3dw8gvhzpF

W2dw6RV1yT4gQD3JWF5PQTz5Ku0iziA+xePKWmnDXSv8/FFD1zzjs5AC3w4b62CRUtNuBkzTp/vxGY2V

NOy5D+eXtPaGk3fZwoG3ufeNFzxLNsmqTD6OBvD+we
+6mnlGa8/XdXmis0sq8drFE3I/91wXld+c3btCVZ0w8HVoaTpxNMuG66OpDHY2XiFylhh+KIw1Wet3dl

Vo+dU0J6VmnK9xTscrI+K2aqkR/MbxmjGyuKrJ+EL1u42oMWYXzzIrOizv4dNKbsM3jT+8MCHS/ug51c

MtqvEvSztjj1B3Bztl3sOHVGUV5LkdDlwVAhpN/ANC
tMsrv1fv1SGweonc05bX6P/6YK4FGRjwbSN629L1bmm9DK7GzSD9G3vaI6sKw5B8Gx7+rhkEeJggbBqu

tSmUC7OAaS7fcJ/Qh5nFZ7cS6z+IJXTlOz+GQ4O4a1m1ggGVgP+87LXUERGJulxdmg1tIptuG8dopm+e

xPTiwzjqTkn+1lNHH/MGAdGl+MHNwQ8iL3kgOvbOpX
p4ECWDEVki9aMwqBzjdTAQ1e6EK/eEtsbp/FI/TSxPJ4YP4N2ZH+VLx20ckNLqfZE9Li+da/tQv5k8d7

6LCzP8ua4tyPq4z5KpehQCFTyTWFmGUtvbVuY5vYoI/waVfaPtEjN5nNUoYjgbz+oA6HvjytjC2lswX8

U1zbKYXGfAUXGNf3et9cXcA7o/2GsePQdVhnE0QnVx
TYbljxI4wAyVZlIj09VMQc5CI6ok1Is87/0VJt08JytH/v8yq9Fx4hTy7vYsEbSq/y94w/5jpmT7EiXv

dN7g/EWqYUFmJf2kVRIDG/oIRw1sgN2F/q+V4FDxkS5qvaRqOh3thckLi/9m+ezIwCrooS3wslV3rOVf

R/R8co4Q6wnWUObXpJ62pFE75yWFj3dLrCfBrFT2IK
EGUezwXWZZdgkodpGGU1ZkPq6Em52WBZg/Atn3Yu1GSjM59Oq0ILHHlNSaY2q569X5SzZfodDfkzMD0r

tggHPO9SyCpjAXNOuEccxm4DzfvI7lCrHAJ6CQrThRPzAMBgQDgW6g95xpDDuDolwvfmemAOr95z0dhQ

s7OH7g5wbVwJ31awevYlszJ9fA/qEqRrcPOBpxaql/
X4fiYb8EWuvDlXsnxShvW4jjCWflvo4qaIndn9msMKhSmGACOrAo+kfAfndKIeKcc8oQ7UiJRq+1CGCE

Hjx+y7CbLsGCAUs+fBisz6kUGa2csQL+pEoKeag2nx/xF1+eD+VRssrUOdXFAt0ryvdbEXO8B+jNj4WJ

DhBl1/xRjegfIxuiCDN+yuvsds5i0Qeh59R0hw4ris
Gqzkm3RwwzPlTpEN/l1asdKx68YyQ9+uWkuBcaZ3bbaW7P7HxRp2QjhMX45O9C60Qpdg7s/0RKJaJph2

OKcgvWCTzGsfH5QhtlyObm3fJ+GXPj88sOZQGKJiHlfMPMqQp9paH87ntvUU7ot/odw1Ydfzvpuhte4v

vG997lrRKTvkH6spU6UULsZVUgdH/kfjZaii2zG5JG
soZWH5od1zlmOSPZsvNiXx3E/kkt1uAAl+yXQ/23dnxK9f4womzX2cDWQ4Oi82m529HPjwjXQcUlQDBl

SE9DEWgVwz5Szu1CvsSo07sgstQebkUDZFv/yFmy3BssfowPYdNVYDyi4haHvfCnesIETOKp2iBhDBcm

plfsejHYlTVNcxc75oFJ7IoeyV9/oq5lo1aamMLfdY
XCxnleSC7Ds7mOz9CXrZfx+ZlnB6OyZnX9oA/m0BsTld+gkdKxQBitmIVyyvquT5egQ5Hx+Ldv4BVxy5

Qa/p0tzOUeahrrt/hcESD4t+wZrMK+XF0AKOAqB2afb70VIoq42qty7GaGmRyts0cEA21L+hMy+3l0cr

B63fsLzSXUj1e+NgNytgcSAISyPMhyh30w0Zr7eM7/
65uet1Nlk/l88UvARYdgnEGE4PrUQDlgdbXSuMZnQ4bk/XIUrh25mLV5Q0Nnafr66LHD8KE/U++jCcmr

Up7eOiNxIpgnou/4j9ysgCNhMa3y751FJ4d2AaJg97HDbHS9kmjrUz3XVJS/59ezsA9PPOLtVikbEH9F

mTMbiY2TsAYuxy4iW0Bna7xacKg8ghPnBfSk1urY7U
XS/j9Yq2RLOO9y2IO0WCmLp/twaQol0XQ9PI5WECXsZuWfeC8FsvgkC0eDGrW30niXBmeZ9+x3Z7cH+O

DvapdLBK2kfKihwJO6VkZ4htSMvBNP7r41j/vFnwZQqhmul+ET5OwfW3OiIupcTYUY2pxJj8I1gQXhxA

6Qfrq2kXtiC9Fn5HMi7S8EmBwteRUJflLnA7hkiFEY
ukH153Sgj1onIhdCMlZdbICn8lVmg59lb0bQ66JxXfkqBY0idg+yKUE7SDEk+qIpxqs88d0GlqCL2SQd

DE6eJWNxElgb+lOb+IngYuIfPPlZ2vPa+kgOu45eFN3goQlihCBlcNDdER7Ltv1FYfYMGaWu/WMIYbEi

OwrtMD6OIlfdQ7vNEX2XZP/2ZFAaFz/4MqRcnkI+o6
w45D4xPjPeKaSImM88WHMXacelGmANrE5wBK+8Yh5hON61OCm8XX+1KVXAh9YKmel42gQe8v8R9PqkyV

X8Lo0j1aZRkT20ZzJJg05ypLw82JzHFKOxAfoRglXp2rW1x5UDctCqtdPUBnpO2prhPCzBtY5MGA9Yiq

PhM0PeJuHoQW6Sr8p7Ar4jLyVFULCO1TUn5IMKUFus
ENZleucWq9fPOBlrhz3Ea/QC8abfEDw6y8ZCHb5b9CCtKCIME4XAeL20ougShxZWIulkhWzpt2QnEvT2

cPW+oBHED+ngUTr7qr0ojKeeisowfStcMKOMuaZI1pTXgGIqcCqq5Yv6iHisnUCinfXCSwibZfCP8Jpa

UDEiLzETYS4pFazopyGW8Ydsi6opKmOP1vDsrz6sF8
HEKdQuFe5AttpTZNc84+E4T8GKPkBzAn21sKVq2i416TTOB8UP1NJ/ECMkTjmHDKbdOr/G88SP9KWDph

1b/Kbu2FOFG6ZZ6l3ResOyW164hw+ULFdWS1Ji1A5t6evxp9pjJWBvuJ3DCHUwJmt04Oqvmz7Sh7a5AS

1GUyTamLbWfxjIAAK6yqCRjhDfam9V7soC/5wlPjrk
PkU6Xohenb5KweqJ8cRjzNDrm0npYliZi+VFBuq38+uriGoGug1Pa5ak2mwTbCvCStc4ffjD2J6nvwHZ

q/lK9PWaW++jnh3Z0ubMBDFQ25JcfLmPHCqHLhj/wXHeEUqhYiP7iwOvlynQSciPLV22UbnUhxgWNN7b

/Lt5c4B1L/Hzq149G3DmYPRksHmpebRQUk2sbyp5q+
mBOBs1vrgjhmB4+nWWKNXpYacP81rqbb17n7yGyHGBfuUUyKhWDTjePCOzg7iymnqnI/3XsuEVUNtLdG

3SxZgNr6PCCoZZStQiLHs7MIaoXupjL953SZoTBBvCeV30o6a803vkuOkpI7Ev33nkvbESBqik3+h0oj

GL3k4PvzEaCjlrx/Bh8NMz3/dJSMh+ganH/QGCUr9y
DCKfcTr/6jTnYv4Bd2Yg1vtS8CxRIsmar+/9h/SFxTaA2ur752hL5AVz6WFOIvfs3h1wtuk4hQGOLDD2

Lomk6ilUOljWOsM3j7EC4akcBbJob1+2FvVcbR1mFx4bJFy9oz6wQCJkJdxFv0p4hZ/nnaA5SATjRpwK

G0kmZ5B36VpLwQodKpV8nhs0YBwCzhXIBzEug17/QL
vAjgmZ5v9LEcufhkUCWmU8/BWBpddSu+dKYFTQwtD1ckUM9714I/GWdHOLwLEKXlYTWqWlq7509rbxop

VIL9ov13Rdby2pM0f2fWjd1do38FtfiyBf+U5aQn3pqaK1SDAoeEQ4kmrlsu+sDl1bjWtLsgqiyBt5Xe

qiDiySa/j19hbqnmgIxzG724zN1DClhxDkPvI2WfK1
eGz5wYeT6IQdGkD/5Z4gNCmirQbtOWkdjfMAdHZQOhN35vMZ61LSF82ZebBi6qoz9qpwPirYJ9g438Nv

TS0tX7Z9sKJTnT6z6uCHyNdSGvnk0XH4xl2Td2WBCzL5YrL0d5xCgMmTZ5lRHoFPNsPNgqUe8dEanKuF

WEnMfk4v1IO/Y2QbkgjnUiv4JnF2qj1bV/3bVoZhum
srGBmZocwRH6cZqnDQYfOe2r8UdraE5xDRBUeiNlrM4TH5jg/10r/PcrOaRBekqVgvsA2ve3dihund9T

5zlyHUis9MoUk4NaYOENwbYfdU5aW6jc3zDsN1Ge+eoPcUMAiWcRvrLAULP5YL9O+fivYScPHtV9ug1q

kcozufH2LOr4sHikrU4sa7/5dE1Jd62tygmcI4sSQY
vMGbLKxp/vsuEd7QIu9ZQu44OjMF1aFwaI9h9KmMbBwuxPITuxijG7YNMBVA8Uh0Eip8o5F9YjR1Q/4I

2h1j3CKfLRiCOSa6QcTg/9MSo/ANBZzWp+56gO+onlH0VbH38UkBHE0u3iS4d/rGLM1H40CnJhSq6VBh

+g+o2QxqFVLeSnQg5+XVOL2v9pb94UXv0VGUG6jihm
44zqUEfqZgNkDpkg4VUoHo4E5ehlQ1AqBJf4FApSnza5UHzwiK/iBVZ2n7lZVcrmNnu/Ukv/1OUkTVJB

DxVbhUAu1qg7gozQ76ryqbhm/kIyE87XVdFVTuR1/XQLuU/q2CSZjlqxtNEmj125JlzGIBg3FyFC79JH

2ZOj3nJIa6n71w0QmZcYxW6bgNCNurgQtommFps329
uzSBh7RFNXgafyLMaY4aqPEAVzqpuG7/IKn4pLKlkhWcmBh6MPxpnNjtukExI6wK8qGsrKneGdiz41at

/0cir/1UMAQ2XtMgXs11h/LPX+AS14JXHsC4pC5qBJj52obGcMDeJZtrOMs4kKDB8gHd37BKsfIf3Zsn

v5HW/jWgrnHPW7Njr1lKCRV8J0nCegQk1CD5FuKitk
CZOlzpqbnfzDkZvMMMc/c9r+Y6z/Ho6+aevyCUG/nFgdJu6F9eS1i5y/1HO9190cMQ113gPI2Ohd+M8W

SopP3dbZcPX5T1lsdplVkoZgbE4lmijqOgY5mD/cvfM4HbFUfmHq4QujfFoXSAEoWnNfJg+wdN397RAJ

VoPHwsUbIGdz4iYuzdAt1GzGHS2A3AEkeuxM6Irc9Q
SCfo9W9FdT+WP+Om92AIYjNVSGoNB3LWZ8m1aJs7VmP51h1vSqPJQ3hUG9f5rKY3Gs9tHxc/ev4vFh0d

jtoiC5I6vLFqr+EMDzZ+Kw+Os6xNzS7dqQ821UxpyfcvL/rGmWBurnlWpyVl2v5lJkYgGXRpqPBR4eC+

xotlIRmQ/WaAX4MgJ0vW5iKq4xpSp7L1lBIAEUlUy0
rE1HYK9smUWFvk2uNooHB9VVEARF35dVE7Pjic2lXgMrfP/I+F38GzUv3XvO7ejUGh4Ld7Y6IhO1GJ9m

nhFm4yS0otxvWXbASf2HW9R+flDJ1HVAZV7HTR95aBhTeDSTcqUdGV1oLoI40aosM4qaP9nYYeOhvyFA

XIAYkY3rNL2GoF11P6QFjFDhWwbStLfY+NrrvqOzFW
NlLmmM1wpmL02X8uPEzpZ0/LWucli1m3JEz+M3zOPofIt7zUzWOBB3DqU6Jaay77vYgIpzAnA+esEqX2

bwkOk5wwLvhJVQ6RWmB9n6eYxQfZExu0HKrQn5iQ7IgjIAX2iNrFHbcS0T+HjdoajYWNfhrZFHJ1ImWN

cStkGfCRkroj3bsWMUOwj1cievpIjC8FGNGk4mof8c
+tLV7YSBO2fVs9jzjhbniQe/pOAkMIdnChI4zncgJtnI3J8cXpt9WvUC+bhDwws6eAVu/FAeIslKPrFB

X2nF4n1l1xnHmLkFgkf9l1rQI2XijzcXddxlHV5X66fxC2NC0e1hG+w4j/SkDXzJYnBksKciGfQH2fL0

wAht3jwUS09/QhkaVH4jaOPRS+4DKx9WJgv1fdv1ip
9CFuKri2jzz9yUn2cbTGdNAlh/9v+//NLawmb4fzJf73EqGbYhcZyh3hvBu3D5+lHMKHx7w5de4pK8Ff

l40hNpw8YJy+qvejC+BC6iei39QZpskTdgam1kgfALBeHGsl16t70QV+0Y5x/n5UP1cYgywtVJ9Op0eJ

c03dh8X2XOSxctkNNXtaFdQOOle/uHZ2ny2C0CunMG
TSVUd//iySZTESlkXgep5jAX6RLd9gCzY8nQvqnI3QjyNmz81lYkEeZCGMG9Tfpu09pwMc/suFj4/sVY

he/L+fGCOSDNy3ex7tw5+7SzCbyPabf87qObDrNOKd+qIOMlbqTFykok83U8nJ7GRt63Jc2tOvSImJBK

h1ZSKJeTcz3P2yWrMJfxwhro8ZFgwNjPls+QipubKE
YUNJ+WgGRVlHaN89zns8IXrUnnRa9hUbgFrM/G25se2/w3Rg0raa0KKM9jujqG63U2755BvrJwHcFMVz

OY2gwCnhkd9Jm6eaAdFgFwssRLCl6mPF2KvQvL3PRNS0NUDW1647z6lepLPq1TL0sE4fVXeWSYEl7Uj+

PbFR0975HZtip6oKVPj6EETvRZinzcNqjCYmWN83r0
jSUr1VJ9gvd/uPwqK129Eu1avcLTf199GmcT4hvePrFxGQ4f7rhCaJNqpdeHsz0PJY8EZGEtcokqRWb4

3p4/QqEy1Mp3iXk4C2Jg9LCuf5DRBDyKbMxKcKo4DzfjYviagEui/yv7nGHVNxnVL+4hraWyvpc1Bv54

TC5wn3C7NJTedeDxOBCGGW86Tt2r8XywJ89oC3obKC
4etOFWr15baOr7Gmn95XnSyhssp9G7STzjldPi0Z4tlOS/xVOKq2zbq84mcbBd14ULLXzYJLwSz4919q

yt16Im+NSCa+8sZGKs43jBawsIEn6hYidMYMAcNwgEThM0gTZj2JJMIfm4L6JbNpwp2ycHh7RdTRHubB

gv80QE0Gl/WTjeucM+LjOrf7ptryoXV6yaHiUv4U+S
S38evW2wQ28Nl7CNrhB2Y9OVMg3VL8ucizAztelY77CrUBAjWA9jA43ZA7b9W3urMIuM5dGCIlf65KK3

fvd+MXDw262O+sUt36PSwqHn0WlsiNFklndfcp/NKjO56koGMb3iJdxsJTyFgo94lofO7iGeYHdOp9zj

/w6jNrrUp3bQirXA6ZovWOsbuHozgKemYxXramhaiC
tINaRnJEX7XT5joOTZtVhjxfW4n4IbPDwxHOi38eQLnzbjXfbbenz1Q9ZVL9GahzedC8V9/Do/KKef0d

0UzsZgAXcveiY/GOQ3+QIt1nAjqjKL4iozcC+72CSSGivft0vR6pV4QwwcQ8iOv94HAI40asS1M/nizx

EwPnbmjVitjXoy1byaSJdy6oPYjoZgNrsYEUVcvhcd
pTOstOx42g32zGm86+lYHaEIAqq8tAS7WTf5mDBMQJvVLpZ8QMgCcnfyWHC6r0/YF0tJChz/oXkq2cLW

g9W8rMCtDR6squrESYv9Sk3Ko32lwylilFil9YoRlCObxtZLgO2yrAxCI223EuS40EcGZGz6QZsy7Wjp

KYBDgOKZBO5d/OZs7xFw8JMfI5aS4uItId/eZBEM5N
9JPkzHigqNp0iWTcGYXGRw+K0kQQbvG7hdq7OzrypR/qVDVEkDv4L/eENKBRRLXTfWT1OYEjw0DLwJUn

05XD4T4P8G2aa8aMuktgIgxFTsZqnwNhF3Ec50vwOJK1jRJEaQCFsYTyWQrPLfkFWKujtZYrS6U3zSzP

MbM9u/vNLbzGhSzIpdllhuUDV38FtxdNSKRiYTIipL
Slm7/G/Epe9xc7aIJ0rUElBWwVKQZW3OAXZMj0RGbTHFN0BJzW7PYsx5eBIy3zOjNeZAfgy8e+SOanSk

wtBhPjelNGPBwn5zHEBkhyWzqfz4lG3/O67E9MK1W4bQeAphk+TEkl3TW1Fs9TpaxYEoDxDLRsHlN2Z0

UOvtg0g57YyyvKG/GcpxBxIf70K9pjb4DrrAo24nHo
qjjcZYHJzRukMtJ6QlqD/Yem2lfN8x8LTNkZrTO6DhD4jTOzMCg31KTSJId7GI2pgq9MGv+iYTZ67V4x

YduhtnVsOpg0p8YJmCnJW6l4DWdfv2H02Q/NgDscT9HHJmIVo9iZ91I2ArgROz0AUfy4pWiv3YcUzeQe

A+r0lefhzukjJfgkFqWx0sYqCjh5+kjhRadsDcJRog
4jjJEjTmrFhsLWHA+sYvm4zq7XbnQIG82/IEiUXVhTC6QmtO4sUe002AsE0W11UNCVJdS8MyG+teSuX1

Tiw1cGX9DxCtswhMX7XsD/EhGJqrRFDIjfVSgWj6ZCIpaXBXhMSDuB6nFkm7YuGisMfua8LAJoHwFft0

4dQF/PThq86fb3W6UC2yHxxSoyBfPIQGN+cYfJ/MfP
fmeU13WgW50U3VDpFzh634rmlcX1gagzSNYWDB3GT/dwPPFl5rC4y9bdJHrEUX7b7fLtorNdvjsZixTb

eU5tJ6Cp7WNa1zIFCtK/u8YreR2bQdMPsYGJMcPzDT7dNs1dz1LV23fjCsNmEhOFSPKk57UEA17Z7Acf

h1ht69ASJtj/bYuQKCllejxw4hskc7uHd05/xH3pNL
ZVtGeysj7CkUpU9dURPRHCtvvSMpRG6Fp2TGWDlrx/6YIMSkFmjLkJ4kvQNyd0SwUqh1782aUb+U3kMy

EZCtsbPM2VCQcWT30yYJ41gOxsBf1Xut6N/w3SowIgAij3OPPNI4aa7Lwrt0EhuXR5THaSsq2rEtExWH

YSK/mHo1BVQ9msX8E1z7TkWWNitjKy+xiM8wmYBbvx
HHnPS7wGK8qqgagTRuB7rVurxOfc5CZyt3l6gCr2voL7FrTKfuidtOtwZGisQd/mUZ5OikSXiieKPjxq

GNrXRkBb8OQBE+1okVHd0QWMwhS75JXoev23mdUjqNl8IsilsdiLskwk2W1CFyWjCN5Uko0Fzz69hooI

31baoTd1HTR/xYgoadSytRgWKOBhFfC0vHZmJpxZsm
y3zaTL/oKKNi1Cawg8NXDnq0ib9JqbF15kRPKa50NNF59YrOoUJavPacWyTSkRybutBKRpCNKAajg7U+

ewG6knM4UM+FnnCVtbLr9wGW20KWl2XYr8mQxLsblWlGMdMEx/tEztrtxfv/rV14jpFpCig9zq5pk//A

7udqGzcMqIddVV4X9tDqs1tSKV2Zz/1yvYQZlD/2ar
UQMKpKPv2QflS3GIdpxr45Zry6lcNNy21pk7pe3GOPzMsxzcdFZ8iR5ciA45w2t6fMOVcgmdr0ij8yi7

VhcZzUoDWKMUjlMXg9Z9ac8hV5aOCnmpojojSOxBk4X2hAGkXkXbxzzpdQslF34hhzrZ5fn1PnGoPIRF

++cQmfGK9mJtDaLKreXm8m+IncqW4xgpbNpCRbiZIa
Iv3741LFzOJBIQGC4Gxm7YAwo+6l5qZbQj3Mxd+iGV5dDMNfJwKdW6JH1pjk01gDWskrNwr1NfaheGn0

rAhTC6CeyGoUu4iOASqi+0wHligDj7tRdDCc35x85wsY/4iA+aaelJ922+A2g6cfaewv5PzVHJWfVsrw

tsMljj9RU4DiCzeFXXXWhSWu961VTZnL3O9CXqSUqi
9TgA0yqgpQmPfaxUOcs7VagvOYU4r/lmZhxXN3p9QVSlr4d0UKsdZ0QL1YTPzsDRlI8XyGQOFbZG0Qgs

xrEVr/7k+9V1QvSIvFoEpxmMVpNWMVYteeRezRSC+Ll6XFwYVTB5yONahjQeES7W9q+ek1egHKgPEz6r

nwFasP8X7jfCuitJssxiLUTnd55axMRrKUD8zewXP3
nZdkKANXXY+T793+iT5tQuIvxznrGCaxx+SZOt6PRx01wrYdgaW+o9k0ixfuLQn3KB0C9JTGKgROvdXa

A8e2kO0FuuuM7BjHyCzIQQkulMEeFCvxpv2e97N0rnIQo/jqMxOgRnuiarL9vcqF1A6PUJlo0PDhPEdD

0K0U+tZ1jOydg3brB8/CKnoTI014ioHrd8qMSN1AEy
IYeskrT6Eb2czkWN4XuiLuv1QBoMSIFofkpQYqgmboT7ZSZH3gGNjUJyyxQdgMHMvzYRlVVD7fwKAu5C

htaF4w0m88S+757jucHE0COlbFCb57aigEZb+z6geJWXqVX9OYrrbQ3ygedoCHcFv6cw0ydySK6IkdZS

zPMdbLje+NPWdJY6hwH/GTd4wPANEF0LPQMdnPyBMs
NFjJnjrN/6pR0DBWXL8Tq+HivYCmoEdk8Ov14436W9SvtXXd54Q0lFvorYrVpai4ctYXGo2+WedSohg3

+BIVEH8j3FvQoRoa83BqBN8bFauwM5HyP3jn0HVQxTVvskYGVHIDkVLQC1zbwh2BZzl7/tcXDi286cmg

m1C5G6UKtKJz+C/Y3EJT/lIPAsLeq/ECZGwHtFb5KS
jt/wJkjkdvB1qLL5xHBEIahfouLawPc7gCDjWX5VP3bejoBE7WrswivwhPnCBdbTYUEcdqH9cYAIn6fv

n2738HPU8hG19nmO+24OC1Yg2gn4M2Ch9rJx9tu+GoJMFeEZ4fAmD9tD7VT5+Ze90l1qA82RC2JFniyZ

yAVnzEGdnD9SRQIZ2O4aypVuzmI2CV5xHMrIst9S7y
kkrYFsk+FTF0KgFxw7uRSSRJox769+zKQ1MOk28oysUz4OGJfWkRuUjZQObSZxU6WDdnM8uyKhKoAcy+

eF/EkV9TTT8Ni7idUJNIwebnIYcStN99LOvT6Cun9PBBV3vnTG5hskW2Y1UnmSlN+4qLBzZRGaKzAvcr

OSzThCw6Sw6K6E03lvk39xoRFpK6wf/K2Zt1a5Iwz8
oJ0kqzf6bJD34CfBvj0V+4EZdud3vQXY99aMAkx2VLzNyEF3YMSwCNjvCDrzew9qUYy2NMz0l/Fl1C3H

9X29hy//63el6exSP+2XSN4FYrc9d7a+AeKhv4CS0MtRJG9n9QbGYhzQ7xL/j32SGr/q0K5SMIw5LGPs

6KDGHf8WePlZgQVs3egraLuxMQHeJShI0kCnYklBUT
tRERNJoe7ZYg/gzHpVTf4cZZ32SFHqJ55NStXt337TbVSR62R10Gge1dDgXHCg/jTM7lK2eerM/mUVY5

LpWLXHu5s3esu52rgc+G83IJbxlX7oIwdoj3Bb7KgE8SJdzaC+ZCHBwmNl8fm8UlxqnOkHP/aPMx+3tc

DSAOtkT/fwpPF3CKQurI56xS2IwP/A7amoOBOTClAG
AgwMQYD9DM2H3rSc+Xicda+lOcnfJ9MUxWdG+GAnUazdD8n0ZyM4c3qtvuokZ7nExr91CAMI2Sue+BFk

WO6ZJF2Uq4V30Eme2++pIWGbcldVT4Jy4eUe/dFitAw7wmyQttomRXXTpqiPIk79WxRMjRP6lPknJFk8

NwSf3CVpDHyw/6VidzECLQuzEFylrKSVSP4h5dfCYL
4afRQTKIgh9Sku6UX3gPhOL/jhg22tWte/40WLwWN4BucwrlRmVy/ObyJuQQiT94Hk6y2bHGnUrojQt9

dzwrhdJMuuoZV5mLLorJBg/dc/Q1+XMkdO23yf6/sb+CrFvc2VWecgVEG9bnaL+gnx+p4Y43r7aqj4aW

H3iTI7cgKaURfSXipI+OtqyamuGLWDPy7qlrdhoQVR
e1TrGNqJJ/xjOLWhSjrEH5LUVvIsGUKoOMjR/1qC8P+HlgVmtq1jZBLkaCCx5nT7DP4z7HnvdAYdRNnT

baY2ej06VXASDCmRf5sbKRnD6ZN3gvCciN9htlp4rAwghw9IIZ/slc2g6bdNkUD7tAtbXjsAQLKVKS+9

HKCEGf+JDigPaG8EnY7xuB6B2dmJ8ct6miJv693lZI
ZJ+LGGnOTBJXhyPKn7QmdtxikcyOlYTYGLBYgxEyWmVu9oSNgV1e7U+0lxSY24XSZW2IgiLX381x55hZ

8BBqdnUHrHxc71EThlF31cXJIqKpZ4l6UwpdB4MUaizINheIen8ky7jKyorZvHTrxtwEYcTJTcbjjL2a

KiGs1w7GAIJU/z4gMF0dQufZIlDzM+3GhtgLojccHz
4ECN6aKEldkR9iVSAnLpbCkzDjTdvIbTb3BSRNV6T56v4Z63v2Ej5Ae5+wIRJOXn7iwuN2cCgKEnxMew

GfiWtY1aLiMMEHbYhsUWYYXfXHo0iHW7H2UhuJzIn+EtB2msAeu6oohYs74u6+QiXq6b5pHYET17kney

ZdBvtYpdoYjETeVMttKheFl+DtBTtMsLeCHRhLKUeA
DExh4/TPmLiMRQcIQliZ1EOrVr4xdlNYJXFhRKRIK/Ntp7iJX9WWKlvt+fGtFGsZHiJ2P5g3KRNXjj95

E8Js/D5BD+4Cv1p8sSkGiX1eXm5X0EuCavBFFLy4RhBkMSZtKKi0+4aVng6J9pfYgwVLxZ2f8mYbmJnx

XwPKaL9oyVMHYKPc2lY5lL88CN7aWioEjFN3ICp47Q
pAPe4gzdf21/98gGv2yjZr4eTjQR2OH6TMzTu7pugTti5HTC2yrfra9jYKtOuPeL/3GbO9/hyhPlEM8j

wnGv1cPNkA0KMhL4bV81lblZupN8/w54dfNfgBiTGrOm6ymevoA4Gpwdy2Ze+UY2cQtJb47efoWug71U

eW4u2IaNos2JkvYW+M3D3dKkQGSP9VYZsVf8K85tbf
2qxImVdFXGwGG79X+Nu6RNBngS7XqjsDf4+Ol5t1QdQcf0H5X2zIa/USEuwp71hlGHmFsapoIOn4F+As

SbabZQGskobdeBAtpj+SDFfXnm5idHgtV72QDDHQv3rraO0gbzi5U3kqaFyKiJimwpZ3dHCgJuk/fvg7

wBom6Qh0wJ5iB+fLxOD6v5UZIbxSvRZTZEkCQfUq43
oZCLQKB3KRW4My8QHAjGZrdhH7H7q/O3ZzI15bijc0eewOeOuszXt9K+LOV4P4ltIrfjPdGmqbSqQLZ4

wYtr6j6v6AklR+8zwehPfolwoW2hDdSHQGKw8iOFErZZ/QrkDHKN9wBaIlMveqm44qghQ3gM6mFX1+cP

YX+UD2IgRJobJCXU86C4+7xjxBL+nE+avaeLzCjbm8
SAwojXiZRShEFaX38QuczT7+SiozF3fWVh1wTegeqySvwypzAdpurozeY7iwLpk989xeHQzoJshbBUBe

rNnN+8IBdTpH9duUwhy3MH/yW6YWqKuzUGV7gjPnoZZzWK4rthuNhN9GPHD1YaB4Wdx2FDPzCd/6Kcy8

o1qUdxqqzw+jZWzUzenNYud/6rCk/8S/Pgss5xsGQZ
C7HBefgjs2Cm6RrQ2FkyiFO2JqbLdCc4fgoVW/kfKW5SOrJ8GN4tSquHkLv4k8FwDl3gb3/Eb5dpDna0

J7TK0EFR7Qd5DKpzBkD/G0SJwHLYaxVAnICOel8jV76jYEnJU2CB0VY531oR3I7u255XZ69zxdVI6k/5

OA4rPl2uNLglx9Vg8zPNHPzbXvIOQioQLpjLuCI5CL
LLDlIs0nJsPZmceyZaNEMRleB7TABfKIFcpu3r7EH+ZZg+Zrh5s1ukROU1HqdJQEu1wRtAQ5w0+xuVtP

81i7mt+F09kiVPoAkeYE++Kx92Z+4DmcgDzrjh1uO1hrYvQOq72E6eGGThaNRNbU/AiNHOfwrORWMB2k

6JZwLEQRZ2Ry6s/TFd+qE1q57LDJmA2OYKieZTFrKM
q969KSFRvYHwwHAykluupgpn48gMmtDHDdKMT6Yq5v41qbG9QfxXoCBtkTaDu2+S+0npANfYRCXYdG4d

mFbOWsYv/e1Ic6MUlbGQD/cUkMTzrzPVhwFxMaqj/LQ3FQISSRNOWu0ilgn3KvmAOZV/rJLiUtCXnY7n

k2Yef953or6lB9pHmk26W7K1e6VvKQtlGJ+D4LHOIB
qRgjikHvzAjb9pxq8pDBB3Qod6qgRy81GkcQSFkFqRyyErmPPQD+dCOJOp7uIsxQrcesaCqobRS0qcZS

HXg8mDaKzR2vVmen9/USb76LdKC2MBp/CM5eWMKjFU6g5r+kxCbHRc4p23QIr1Zs5opn53+lZXNcAJEW

izpEmWpG4Vf7rK5VrTvsJWnXfaWtP3/I5DY+ajfeOy
3ZCPpOSHk0RrxPvB946VqA1YtzFX8XEQkZ+uw980omnNDkpMCb0Iep3pc1+muT/hz71ciZxi2F3uxzJA

4vg561k7aKm0Bc7fIA7PIAW3pEyHpoUuFXeJzaCHX+UPy9zUFmv2oxVlpAs3y7d3loLrw9CoI4XIVa+p

xvXjc7vepqBBmak+nYWE/WoD200Kr8pyFLO0o/U7bU
pW2uzxSmpmTEPOaKzJBagk5XHuchBxS/EL1fZynh/c8YsDU46ASmW4Kd1gDVEG3EJ5wgDhbc6hznI39d

nz78HA/4PghAEqSsAHTsqS7DfPPHGJjiCb39bIXgDe5IZfC+oTn+pHvVGVvYz1ZEeIS/7bPvsQ2m0/tC

gdHTS9/U/n36K37+XoPgrt3kLYnl6HoLp9owlwYG2z
VB0a76hqs6MAD5fJ2NlKGb8EfJGCEEtP5Rly1Qo20b7LK95BsDGSFGu/olnzmYilVO3M+kWgefzrXYUX

fsHWRb2gAW5zrZ0fmevr5vtNxU+mRr+oKnxcSBNps++JkbBWGvdoE85ptn2C1acT/RaeSOQP3qc/SdVq

NlJXmErUCpOGqYl7uMZmt2X3yzl5QJi4lnXrISwGy0
p00R6caqX5qe/1aFk9YRJ2kY2hLqolBiFoDFWaUqwLouEzBfiVbd2DRT+S3dBfqMJvMuL5ZuoiRwx8BE

VRZiWqSOAT11354NYYXPbyAm4Phpa7/1sVEdtJ/Jbjpz2jseFlQl+T5ymChwLmQUk8hZ/904gNfqufl1

uAbvBF60hoobEhwy0W18ozAJ9ZG493qm466/NEmpoX
RTmMM0SrMccZ9eSuay7Q6BkpucCZomQNbhXjierf3NLQUZaBTLairN/soIS6ng7QM7zx6ADXE2HlE/E9

hJ2oGQPa32pMfDCUe2Mqx3JCIhCoqYqVGWYPY937dfvEI1hWP9w/umW34scHhfpJEzUaZFkGBKBSi5Je

g49uAa/cx0TGpWuUlXr092o/JCPhlKto4yCtgqCGBS
mtpRhq5LKTNXYuiw+tSxbjAmUnVmYAHpuLg/D6NBAdl8z3vt2bcHwBYQgbI/I1sngWQmwbj0NAVH75iK

V1MWxpbQyRg1JKD84/4ap/23zm8rhvXvr0U12LAP52ws5Z34Vi29hWSiQPB991Jt16Y8hI9XFKbvjow8

SN8gWgjLa7hmUylv+cq73wgy+i4KXUkLTDt7z0NVb4
CkNh5+No0YobRQQobyZPBihEVa+ixl+n9aYfaO8NZS9A19BwAbQMrozEvFNC0q8fBETT3VW4PP0s1+qn

LOwlD7W/6cOYnldBOLsqUC91fpldaMd++AdzAEaXpfcp7rsK92fyiA+lkCCwE3HibNvP9ipXyk13Clwj

gX0EF+lGEkappDETfS/yROpr8SV7RS7ToCpsi/mlb2
Fgzer4YZO59F6nXYfbPUyaV0oF/M/OdUCFlw/oWfmtHXDrPv2hcMiHhpcycCvS0ThdM7P03qviMhJILG

gEGUiV0K/zPL8baSi3pbt2AKmIHbGvMd1huoNFx4blkstTKa0mbyhQf7jIBCGkkvoOOQQH7Jdq2r9J3t

Qj5zOSi0bO8qmP/ybxOLc8mLjwOh+cEDpJaTlgcpmG
xDbOqraOyPQVm4vZAxDzN4uHjXhp2nH67Bhup64+T8krzacx6d5+DnuKzsr07O+sRkGq39wxQM5eExHb

te1yawibOr5MfsHLeuGCR87VsBgy1PZTjWtTDq4+gMpNQjrnOzOoCi8F8RcnR+7hEbGbw5ISIM+SI1xW

8/rQrkssuungI/dNuOl7+r3p8WPCxlAGe1oKt3gGbY
S2yoi4ySLVSJiE7eL6BdZXFbwS6if3AJDpAw6spvbRlD7NOKYCh7F9949fWkWriitK2VDSp66FhmNOo5

N7I7h1BglnTN3eTRcSnuKXgDN4l1wVTHZeWsF2m8OMzJ6L9vKjO/wfhmPHRTM13zrcQjxZzh+TZ38PK5

ru0S2kkNXDRBnt06Hcs28zVFBMhx1ywXutDD2yvOrr
qx8VcYkZr80Zqzzb0oJ9JfZ9DPRdv19ddYtHhf+QGgHDt52wcIfyZ7pWYbkE61ZGjIliJwv4S/wKFZlj

k/nJU6CrVqqG+J61uNEeBQQ2qHUwBy13mzzfoyMqU7oiBwr07TED4N2lDX7Y8SuBGQ7jXd7D/LIWTCXD

hAUTcrCA4uTI9GuA7DOHrZgwL5j9ZK3k67x+ANSe1q
QETHicffCYrGGvczUh3+67rhx1xY/fVkE0wHbO3CeL8PgT9Mcdf4KEV8MLjhXfO0K5//AkBAAgAlAyDn

VSex+9xHG9PKRXDEOQR1SBrSQ3sobTemz8QDXzBdicYoSkEDtylU6S9kQxW3CdrJx3wFFU0ITkMj2QT5

dlEbYN3v4OQEqgn5Vo8g1XeXybd3/4cirMEL6NaN+/
ql97J1sAa21PwaTR0p3KFMbH1WWHvrQoXxM83hiE8cgoWOgUn61FOY+Q3WtmuhZa0Tr/nGBD8CI6DmG2

yZoxm9v7JSs55QT+x6qURTbzhMpTzSNFQhr84QH7cSgbXez2NbzEdoy7mvLmwr1DjaM0oCiAIQANICnv

35mSLA2Kquf/061Vvp8tJI5Dfkbm5oGz3dWuJiZ+XD
BcGZA5dF3t+0/sWYBkH2vthTG34Cq7vFUIrXSC8wqnbGYsd4G1r2umylVOerufFD55XQ6IqeZ+mNkJZe

wz0wmlihLsXOXIh7oo9gGqbiyxdSSO4y5Q+hnmABWh61qFrF7RCrIhKi5R8r1gzKrLbSV9KoyfSrbn1f

rYOjrK8kTAZ2Hxp68YVM1l+mi+T0xAfu62Af4ACRai
9BrD65ezdn/S+VHv/3OzMOHjfy511AEhQzKEojOHMDRFkqBR6A9iwX1jTZNcTz0M7tQPaSjvpXkuy6yM

BBJaQgcDCTVSN+9v9jnn/+MfQ1A58C/6jOjd1vCbeClqgJfgvFJm234009yWnJ1rqDlCFEXGo/mYV7Or

7knrikyPSAWTkg1Ga7b7vnw+BykjkC6fsU9XbAk3zD
71trGhDtS3bcVI3hK58TlCXEabdBayJsacBETdmo20f3IKuQmEuWTbOlASK9qLBjGBOLCH1B6J4VLkjN

qdBwHzT4ky+NJo1Ktihxw04FsVG3QyJoT2THh2Sbwjl3sPFjhH8UqaizSM+hBVxlT9Vu+YDmcz4pnn6T

e0TkgKGYkIdOUYyQabMtft0CCwWimk6BcP4iMQdRmO
Qr9MTXlz2lVDmpyJWU/q8kZiTOro7+KjRX5mVE3KiKoTjIt5SEOYrJek4LQGZ8lKfAv5/+i+mSf2K57w

gpecUzxhq3epg4FlRzzcz6+ynZxS/3LsVGQv4LEXDF35MyQ0Fct/qC+g42dPwfX9EygpF0LhE2cbKYLT

/iXQlqI9Nhyy8Aixn8jL4owzLgqWPCCrvdJ1KolrFP
pWPXI6Y1rwNesf3KN9EI//uM/GzmtMRNSmx14+hnlEzH7aHfxxJjptXdjZG0JzTH7UcE0WFazYu8ULaj

i2vbrHnRKkFBRXNyyokWGLiwNWwu+LOTMnxp7fL8EodfqV+6O3YVdRIL2sFSjt3RuB5eYxwv/zfcgh/S

zwDRb4+nCzqjBEw84QVKnodr+nKjtfT/r0mLiRI66i
2rjrVEcfkQpq8Bh684HY9oT3UkHDn2W66LJ5jBqm9zpuT3mfQi1LfAyEWUqAc1vRPP+J/BAuFCdM+142

mGiaynceEzdEhjKJE0Q3ISsU4G3f9hNM5GHVaPze/ij/kaey+sfzMAcqEMRZrga6wGUZRh9nHAyZ3Kxc

s7W8b2mLOb29FvCmtJRI/lNgjjIi/lNgBrrsAacUzH
XWstbBZ5LpSVN3HNkEL/uIfdishxdeyaGNWF3xzyfutYvcY4UqtM917fm67T+NQ01ja92GPsxC7966L5

+lWGvbCL2a2uXYW5/jYwj9Svp6MkzNXrRtWuSRosSilMIZYKYWgeL08mFPNa0LCRhq9bUai2hOD9MqvA

BfXF9dmqxNHhrFyrR/RmmuL9B/nXZwt2kRRmD1fHhX
tplUWF+8r9mMY/5gzrKm5x5T3d2i/Ly/8LUSo9FhEjvo3BXpwBN7ElI7WZSDwWr3wxnzhvGFGMzRqMmt

sqDoEpg/tzjOspDo6+83830vYb4PjoBE+DKb3zYZz4Q38tDoLICsBf7/Dvs5AV0e70SDPymB6bTB6Kcx

ne7R8yQ+kUz04/5dqw/1BuDkvJguWWZShGmPhPYLr3
jJQUNxjRevGh2XqUdxb3C1F+hn5jVg+S/3U+Oh3/y/y0dqbhfCUU01l57XplN5XnH87LgTheuT95w4p3

jXKHWgon5qWxWyPVtM0f+RADjpviruCse3fTyy1PPMV2yvMkDlWspNleHv4VXRXkweweQhUOz+pAxs+S

O/utci0qyBYCJeVM55jhav0i69VTd1M7k0zJu8qAkp
j/qAkKxLlLIijtNXT03xThWlvR6siZW7QxrCfSXcCB7GaGulPZJVvXq+jSCNzxeXSs+fmxe/1WtEQl46

sflArLEd6uLKUKeCn/eS3PYpSOuwPvz8HaPpBZMbRyJ8fFliFme1PsTflba/44nc9fLspsNoaqrDwU9w

w6p9FBpRFMBPNyAJVdXszKWMeplhrPbhEs5aU1pe2/
/vBzxIvAtiyzy6U0gh0e+c2/AyRCj6XubE+KeWfrC6Y1hS0GdVUiFSs10YbOxpVhQP8WjZ88w/gRV7vO

jXsQVeQzKLDISZXI7/KMMYiZpVErw0ObTxQoKbnU0jViHPLBx+dTmXHcMa3+SiFV7xm4LkXoU7TzbxjC

1vGpdJUyvkIUxgiZoTSBfxt7IjjtfR5EAc60pM981k
jSRSNUKtNrs7aLe3fH88pj3g4sj8klNhZyzo6+76xOiDZSeBiwDitTGj3KvKY2c97Zt7qFhYwwkU9luy

t6ZMc8HN/HUUfu3XHUtsloNnOWXlsK3ZKdwqmwkj5hHyd23Eih6jyrMZBjsrh1z2Q4fKA4HUPhpD6hX/

dHRbQkBM55OGXMLnAPbNVDlCFe+oI9YZDXkRhWg/tq
005PXivjtZbWao4A5Cq5BwPHVPrV70LMLrIVDoTIko65OHYqjnABMNaaAd6GiOVMgIxEmlIhUHD7auST

3Unsy1SGv7nQxnryGG9eTwO4qqvhz0KHiF4DfS3dcQcrYJO6t+zy13/tfhAaiywjJnO+kQ8D9LZOCAvf

wc9K8jeo5k6udAvz+Lq7gVm0hmbzRTe3dm0DNX+0Sh
35qfl4QclZlTtYY49C/iIGXg7yvpo6SIxKjcdz+xJYLfrOHMKL6lp8dfgHjRJy6lCicLTxOhyd9EeLGS

Ko4JqvGTMhh3DSSnQqHLNtjUneX9m3G4VijQ7tftbrcXFNLl1ZmDkVS+kZs5/faT+EKB4IseP7+YwTVj

YznA5nruCnaGQyxZS5X2vT5M7jHHqDtznAq0dj0N/i
Y8H48jFwkYy2yI4qR+fxq9BAwG8AjVDqeTPWdIpPcB4Ftb0pLYFGHBxC5edA1yvBWpHDgI+Z5lNOu2VO

ki3OSlNQ2MX5IkQVpPcFvggmVPg6bAhrVC+8hpfVpSEcjAMHiPO+vgRjw0Cs6wIhSSx4hg5bzSX8R4VB

61nSV378F3N55j+tGISbUFRx7h3SFF6Re0kp5bsVys
NfihhCJOsFSUlMP0ugjbspNFB5FtTqtWpkkurDsaH25Z8PxVIFRNcMLY5L+ZogfirwtnnL076YhebO7w

9FKQGVHaZpTNQ6OyH6uhwljX8ScGq/VmNxNU8UZAl482NScHSbCo6/S/38WdmPr25tUJlm3C98TbEw7p

Im5WCDzdqryyae0Gnld3Z+Ghsa9NeLHDW0n68/EB58
tFq9w+TtMLwSDx08s0cZ26xb6ANN8QEaLYZ3e4iS0IcLjA1fqit0sRgSRhV+y62M6IF+KHo1Tnx4VtLZ

xfTF+HUfJb3f8NP/m2GDD5UBZ4xfblB5kP9AjmJdXIvw9j/dhWG/auU3VrRGhatBpz786zjbHI/dIlyN

JiUSoiOyWK4MZgkt6D1xeyxV/Ib7C0pZOr/4h0SoY4
qIVz+oQvbeUp673w215azafv6KRbh8SITUg+es2eJqTQwzdlvbF9jdgHjkZd9vYA7QYyDcKvjcgIlTsm

vOGN2DdVUr+ftel7L2a/2ej/FanKo+jNnBP+vgzUxfW8mjkuFb+K/EdHXt4JXNpU7GdVAYNwtd406pnW

CvcZ+QrL0kzhgJ/1lsmd6SaB/gkiHlxTE3s2GuCN1h
A+bfyqKrYoPEsz+gRPSGPQ0xFk1B8DqvYdrVKzYdYc6wZQjG7TNrPqSvtwUnjxswoj38tuiVFAP4eNJi

knK1UCEP3vQJNKGjNjvNG14irdWfT9PHv/WbrdoxyX6lTRRcUk4yilj1ouYgGzcge99y5M8taOn3wQYd

d0VgQ6gcCvcCOpdtgzOrDf61DYUFedNmnKovcfj0nB
SaoWCwItq5hUsIZVQxvYb5GFwLolF5Ls8cDVM8x7JKsfWVtcR0rG9lBnYLgu/TinPaINHHLhDSNHcYvv

AF/NDvwyu6j+nXdThnRn6gm8KBKFwPscEN9c3CRC+JjJM+ReNFJwt1D2iBjetcj1Nvu8ChyI8B98ExGN

MXVYo49N2WtXsh/M1vhEsRHPU+VEZjLiWUOHoNjxuD
a+KoivZla/HqJ6dl+HV0gzUtFB/m8+1/uVGJ+bEntiqf2k2Wy4tJKBO2cKANtrDV9BUHIaERp251S0vD

8ZMQ2UluNV0OQOnXagDK2p7sOrACPhV4tLIUeD5w0+S+mPC+wH/Ai0CVIsSdyA7GaBApoX2TiwBwvvua

fCAKszONgmlVnWw23OCM25TwlkVlfVzqCktc3Z6N6K
TOXQ6Ciqgs2TlKeRZ41ZV+80VBpgPW4Owdkw313cpGHZYhJkYfbdeszYMtN3lMqSY33IYw4D+QqNcSfz

kLJHTWs1E8eBmS6DEaaZF5fJsbQo3vqeR+GPymuUCAi+kZBAIzjB2Tr1ZuQBtfz+A49+mSaQq07fFZw2

2L/nm3aIz9b8OF9rkqwbCZbnWPkj6cKps3g57KFR+m
WKyXBizpjFWFnjhiXX02OesMliyqeIOSYetBAVPDScmYEfRwjwDo1y0QrFZ614ewHDMYnpimufCvXKPD

FGo9sOgMe92CvtKJsBfzj4sFnryY1nITle3WuGB6UmSrLf2Pp6gxOIfjPPyAzYd//79lZKkqJ0Mu06YI

goAd7Lpa/Tn45iVDuGq2mWch7Oa9bR3EkLsvyk0b9V
lFLNDfVEn8TnTI6wnYM4IdnUc2vsbmR4z6nlOVXE2pIGbywJqsvXlNZkphYcKJYc+HTpcHe+U1rWaiSr

VkYckKvCAkzb6MdIgBdEPRAtTiiLOzt6ATL0rp5RduTU2ZeULCj1pAKrBcorK8wZWkfZBqHAZ36PhMtE

hxySxuXFWDbQwrxMJaVTLuNhQY5F4UAtq9PK9xTTbX
ztDp4TshQYaEHdRgcE382YbjTTitAG8muEKAZz3ZV8RgsQiuaOWTGJcLfUmHFr9WnkHibSLvTB22rkSD

2vPtRd1YBkbUUrT71AevcoEaScnD8Y3mOabzUkbKWb8+XRrV3p/nPOcmRS3z+plrY/EF0DEi9Zl0gXGY

979TKXXMGl6wCzoQpAwrLG7Imt/tLLC3c5hbhpuXCz
CVJDDi7oC5J+NbHA2W8K2KBoyBEPzSzf+2A2Uej7Kf3uK8zR7w+I2oBJJK3sGSRCjkjRix2DrdLIR2p3

KjIRYvSmraa+SPo8YANH3z579QYkVHpz9PnJpnRq5MKoEbuAoBFx15CBStRiVvwDsFhxSo9SIgL+akz5

zyQZPzao3NHYnHJin+CM8YptdOlVEtWrsbslVptOPv
Id91wo9fOEmMlNAharNQVjZLMS+tsGfTn9yqJtQPdz0fZh9v5nDbxRwULR4STUdidb9cktHO3vFuVZRH

6AkmdlrHmr3c0xT4DqO/GleiaAQIeQpjZg4e3H7Ht0g8ilgYJZSNQ/jKivk8HAY4iAZ5z5z6urGrgIxQ

YiwS0nrCb8jfBSze8D6M0Iq/GjKbTKKqH5Uid4HkBq
99q8zru96YPCNfOK6FxX5cmnfNLh/FIf8wmZV+VzJpw6x3wWjYu5twN56dlC6anfyzakIBEoDfEVEN8R

bPLascsqck0HveKWZfoxxcAAc90B7kqv2KvxwjPTpQ6PjTzLTr7dZ3OzB0yrBw2uJt7eBvwbWg70DZ99

xYwPWCOK8BM55HdRh/FB2xGwvGzNAzofLDyuQEXyZD
9SU7kAvMtHf3+MqqBUZFOMr3actZ2tM3XYAcCz2xmSKX3O+gNphjN9bTypIlJkyz5uCTVTfOzGQzL4YM

wPrm8LspHcqg3eOfgOE9P8elzp/yFij/ekZKpBJ8Mud9D2SeyPlk0/eZhtRAgI1420bwjgD97j1gck3i

wW9Ez3+4b1tVHmG2fVm1nFhvZqaCdLO9zxN6pXP39X
0JnRYenrG/cxi9UAQFTXnHozo/lDOTSuSoXrkN4WesdYa5zCB17YtCrUzM7K3f/CEsuMZSg3+Iw4j35R

OVyuEKwYN9q9ZdX/Em/JGK1s3wPnPlrK3I3XKHVX2eCUuCoLOGXFS3Hb96Nf83dZQvn3fAkWC+6k+nV4

/i8IzroGQ7x117+cYuP89v3FxLbKZSSkmvPWYc/Y2b
yfL9wrezPA+ni5pogZJwdPhwxzD9iJ3f9RyI/mD/0d6r3MkOHfmhqQGJrseU4JdiBMS0uZ4HTfM5tIVk

wbUNCw/2Zy3AVvrI9aaUs51m9zd00cxYmdaj4q0kN9FX8Ed3kf7e2/tiwWFU7UtnbEZ8qL51C6RulgDw

ERXa4ljtr+AcouM9ta5tp+ODyfNS2oDo3Ly2RP2HXc
YxgjLBIulZutcNKhAZyM54g7Tu5WfwdwiiXJCtNJeR4FqXM9hJnMO4Aen5ShB9nAI3UW1bw1ZcZfyUbd

0GSqHJOsK3TjpbAFVuSuIU81TzdURRlUlFV5Y+yVcLcCp3XJemaYyjUycbBVcIetLYm61g5rYMsARS9L

+vo6KbWYF/KtnCIu80G4cOqirrlp9iyfYFX9cLoSUq
7rO2F40O/wObYvFmwt7Gk+Cuyrod5/InU7lHlYU7D4R1pP00N2xKee2qjiWiwtbR/vkfryrBprXx6GMB

1raPyCDrRM094M1MEtZWlclHuiupWiFr3QPOj2fIh1ROmXbLqg6y2cnNymfuacCKHXbVI86NjGqU6v6k

uGpNd2DAr6/vVF1rq8E5FNoUMUqBalKT8abpZi3lee
f00qxSDx//gnCki2bfh4VtPA6q3AgFg72gXX4A1YdKCs1IFTR8ghdD6+6PA/OTDTpUR1nuFqoVtbR8F/

yt7Th4DhcyA7VdgSp1qyU07WkNhvYdFq+2KP3A8iYIK9te+tmJ1FgQcEN+fondQB+9NF7S92lvqrzy92

744neC+dtwCY6ImMbPiVe1dS9FlyPdoFwDxU4ur+hd
6uekVqTx3PxyZqTUQSB1UX8vYs3Ta9KNzGwJMEYteCmjtHfb53w0MJUz4bcOW4VJiP1JaoNWrRfAP0o8

wFsuRVtUPM9Tg4Ovync6OGwuZ89M3OYdhpmy+HfEAMt2fn1A75ENGb6IwugBAt1bOOOcBxeTz4E9Ioqk

T2tdJX0Z1El35iOa8WdpFnO15epCTGV43gEmp0yB09
zBPfZn4Pe4+YsRokCMeaZaDkeg0TuKWkggZeujZoj0zN1AiS8GrFN+1pLd6PxPcAjMX+jN78zjTAzJ0B

1xhdwwzKv2fCpu5EMSb8CFN9PLgKj050+WhdF3IDDC9ys+MSX9/GwlyHCPwHzhk2jdY+mEGc6oSZfdzr

Q2rjZ+j4jbzHptFWneP+kxoKPwBtrOAngoC5XDHD9t
ekeozbYe8qoTh8EcdhLqK/vu2sSokJRq6pQNaPMOd1kLN0ucxUkNtRsdLBwz2J0bXNO2wj4JTWYSVw6t

0Eldd6mYy2c1ZO8JCJWno/Wbrto/ELP8s2RdaTSndX7Z4w62X2Tf6EjnNtT9ZZSkOIwe6VEamfrAfUwT

ihFSPciXDyS+3W/ieQ/v6l+DDTSKdOGEW5wkQRk2TP
BdULDDGRBv712HkJF4GP6zuZfpoUNL4EgJGx2SZiysHX1anVjFrcNrLABARRk9Vrv7laba7atL9V5xkX

i7Fcx1VI7TyqrtoGiPcSixvHHAzMSxRvu53yhEuMx5KU245yhXh/aNnFoQ3A372z1RcOdCbIl4Cyxjsj

gDsqeYCYQICrCvCuX1OjTkqHl17oHoZqelOwKDIZFI
F43y3fS95+1n5qXVaaHX63N2YudJt+MQFy15l5bcy5xMV12JHtF/nLt30SMcmE6qVQ0vxvXqB5/K4ibs

JAOQa8Pe9ZwNUNCbgAdbbktPLF9sjy5jtNM0cGyY/lwVdC0Gp4H7utzrNOEBnFnCykj37PtGgtF/s7Nv

RNHS46Bx952PhOYK9130W7UAIX6ByOTlX7pATTIduy
rmeKoZQWfJsJHCfu32LPw1dpv/054ric33BacDx/kLfDQvsJ+2NPDu4hoXkwHKsP3O67rvWTpbzz/NqJ

j9lndxooOYosGrKcI+G5CWeDPgZxlBl/qzE0/qzIvIUfo4SvfKAJachOxJq/mWxTCQ3/JuwRS0amhEVG

d7KCOBXmkldQUjjjOVOP91MSz0wLsnmHKk6GjsVKew
0HbRozItRqLgyW6TBszHMdKM/0hRGWtYs0JKerm5pNjxUQl+G93bH25H1DSwrMxrLQYHT4bdvLTeg7Jz

v0n+p3W5hSHkUNpxmCp3YN5CDs9xma/cAhmzo4yRygREBe9HHxXn1CdTDDloSc6lEa3TUdVFVneshaRR

FeWOQ3GJMvDr1daV3I5CCt9rK/w4gFBwqyBpsM13aL
Xzl63tl67St2WQ4o3UdWXaNTII0apPZl9JSegeAaWxh4rLYN5KBLam/OkOjnozA13rywmkvM90D5SCt+

kQEbp0GfLHR670S0HGlIkPSCeG2HHHTc2JZG6ELFi9O1OQhdz0v3flJD5UhQIU8O5o/NRHv8aWRTKKOD

QtWL1kZP5ysyJaBl5p8tBz16Mr+8xAs/VvDAQSNg7w
jK5mC6PL7ct98x/FAqpCBF1G9rrfBwJrs8yeTS2/4TWgSAx1/kNt/NaJQR2FMI4ocvx2R9Xdpc4xJQNR

frS0bbLwixpMkb5jA9tCYpIDeqpsSSnvfqSzl3nSBmsAJno1TeeZ2npt4UfMRrMuzrtWps0J6se1X2uf

p6+sJCtH+LapUb5I05j3F3LrnkUNHnCzgzth+T3t83
Muew54sjlmDcQmbANMTl/XuIYuvcXNGwKBOethF3jXjoJd8Ft7buW5qPvn2JOwBuh3ILMPRG0Wnqb3X8

jbyuHY0cD4uRfkWgTQmIWlY7HHpLr9hSFwc7B4KacbaHgCpS8Y/hx2n8BvALpSCgSyA1iNLaOpJUwhuG

wCyz3ipnOMgCFCWNbvXS2x9x9GnLfuu62p+kKCZf2a
9jkJXRv4XI/lX925hywNmidG2B91R1o4KDwayLYq1JfaJzabnSbzJdOtMiOUnUg9eRvzEiMg3H/orh/V

g00UNACEWDA6CtgVSb6lkQIPWcV6ZwsaS57uZW1SUmhPsqJoLGj8WVdlQfLrRhjGm7989B35rBpWJhb1

w9OM4IQfwSOr3Gf3KABEdHt/1wGKRRERvQ49DWzZse
T6uFaufjO2miI9cc4rl1Ri85yvaByGa0MwM6r9U9mfn5umWlCOf5PebMX6y9hOaZL8GizgkTikcaBBTZ

u6qt6GjGQU5zidROoJYrRlKyNae9ZsV8UIbrJNi+ljNn/uwcu1jjnCKZ4qr6AzY/xAnqTGfDAlHPpzG8

nLICdoqaesEEdA+arUU570xHGyUIyTd7VO6b341WGM
jXZXWl/blljed0YC5TRlJ9MBXWbk6idWVz73aBMYilC70uVsByoJ8Xq3dSqmwiSaHgXSO8lnyFmW1Xpz

iOa5Ab9XroEZULLcpxZGf3RsqB9E+qWbX3i+wDkq9lYNmglGvCzWei+bGXMnY8PMH3mnRKuLG2YG0bvN

BtjFpM6/yqFDJbNF2D4MAV0U/LUg34bFRoV0Z8+0zf
WIc/9dO4gyhm+pwapTcoCOMr7r2n/jEaJ8mnUmORS+G/PmHpNhkwLGkVhLlW9ARjViLfsXuq0COYFvGS

nTvY21YBN54hQ2zn4Gwz0DBvSJ8OULT3s74Ae/u/rU63OGaOhCb91DjUdZYGCkthFWkBdeuN1+41Dga3

5MNTUxgUCWPcFC/I+knjnbw5pBF2+ldiWHfPTNYZj3
EnKDSYPL60FSAuPJEa7SWuGaaT4sHoBzuUjGJnxar68tIMHcnFsOmUuXPDBxaCA+QULaQVP1mXrmohob

8BI5VYHXZGCfRfqnwebE7FNKfl0xpfP9XTpQK8SYsvZNYe5NEZqcGacjHz4QfoVn6K/5MPaEeUXY3U2t

dQyNQmgdqz+M5DRIzjl7pVLt9HNFB/2/amHAlOFoGh
QDFKwB9eNVGQgrKO3wQFTdx97tiA5Ry4iN9qzC7bOaSF79AU44u3jx/w52uR0EzRgKk0BqlywI3Ek/r7

PPhzEzQ8Q83ikDCfGm56nsTqnkvmLH4k5k/RkkvlzNKJ04s4SNwUSCVbp1yzB6q86zviyaHkLcFn5aZn

iiI36608TQsWn6xNADVgI+/++hfltTc/8+yZiR/vam
+LjX+0uD1D7ujXEsGA2BrZIGumiBPhuJ0cv0QcXS5D6dcjYSty6lMNEnbBCEwn3wenj2haEarIWAyAjX

71WTpHE5Ig+GZ1V/KqANT0OjvrqI/kFc/zv0Ub6ZV3pzWSmWEd3WR4FP8m90OV//zhYv61hH10Bvfoav

xsf3tsQXZJ33RNcz5XxOMB9pWFUwWR87F3pywfP6Bh
IhwFeXrM61obCMdg8h+PzBtK6qV/AOBmjZu/A39ZjE1JyRAgZuGWMnSoWd9DUcB7LPynyAjbKBSVciNA

2ZJG+HVcdqGDGN9ACA0zBuIuYlkbAXCFy0r5fr3T6d/t2AM0BhGkr24injykYDXFzrEgLdvaXFmdurMR

nCD3E6k+F72kM0wTfojE5Q2G7hVLfeqtUJ/L15hhFt
hj+TZaG+pPgw6gU/hxN0CN9edQtqSQuUPykQb6mHotW/UVB3ue7ZgA6vFttvKiQqhlim/OS9Xf3KGONg

0AJBrF6jE1QudFlXMM8zMpKE5UKK/D2hRSU/iW0wproK0cbr8TJ4TwhSkfrxNrym1ty8AZ5+bbFMYLX9

sI3FfQJBdub0AvTy7XNIyXR1qaopss8OITFwTHUFDl
4u8fBbjQz1vkULUbYQl62J/eNl+UI31DlfdWcO68Eca+mXbIpChDuQR3ZjjPLxkARjn77vJ2BHRKfJfh

W1imLfijmJYjI2po7uMP5RV01RqbHFmIsqd2LvHkJQC1Vclbx51OyBSEPTwUXRFZKs7euD3uHWYEwzy0

FexsOS75ag5m2ntsFTt7NlF77bTvp3oL9FZ9ukKyz0
XirGvUROdpfhbNHtgLOpUUzTg6xsU2mrUL1iohz0W+SPKNdOayij4t2w7sLTn8cMQ0q0HSmWgrp3bX44

nkKLGN4j9Kh0iXccdbOwopULsFcVzoge/j/4oLP0pK7Wjdf+9xTDpwts+i2sjC2cXOdXvsdKR9aJ/5wi

4pgw5N+WiVjg3Aw8L43W+0GWKpmdH4brN9DFK4aQPH
iiQkpzPBVca08WUjyWXM/CFJ1CoHmsr8ncuctq8vfooc0A3TYw/IHRiJl7KxahQhT6paHEd8r6dy0EsW

x32SmVGvDNWaz83ETAS05J/73WPKAALY4K5qeXkkoi5FVhTY5hZOtNIZwXCa4DXIvKSz/IpekZ8k1aaG

ozyQwk338rR1xBv29UJElqO1bnTAayVmWHm1ZoMCBS
Yn75h1SP0PKyD4wamIZ2Wf4ylMTxlGIJXBq4X/ZlIFv0m3dTYEVJewrqPt2yxdgX7VKfYFb6QF/c0ERD

v78/7cMc3h0tCqgE1/xigrqY5H0O5LqXLnn1JS2ON2eQ9k02oGI7e8xsTN8F0+72PbGnyfeAs5t0klzC

HHKTNjeSN16lcXr2KRfGjyjX0Hloj7vBAMHJFOrBxy
sHK6KvySyW3fNfRROevdCBNBFTQlSQjATY4FY3bvNParYeCjvzMjQm7b1+Hpz29/7NYc+QOiOwWkI3EY

gL3PR3gNdXYNrhEwtXKXjo7a0S3OyWWO+Slz05nf6+0x/0TyPyOILBcLfy8Jz4IeHYzFgD0Wlsv6ZfaP

t9oZjg/4kT/iFG/UepWq3ws06zXyP02huZcwknhndt
VSt3XvX0LWIOm7qlLS7PrkjKzI74srV7eylk+MCU7hfPcp3MCgOFvckNFvHH65ndIcYjZfaXHA5dZLnv

2KOloy+JRy68iBE/uAg8VZPmdGwu6k+/F7KWIMulYgUCPiGa7qkjXsq87/H6RmIeDjhP89oOWXujyY4q

Ke9h7gGVP7VaYLe8l1tt/+av91cLGdKdZn5uCoJ/1R
2P3Akx20o08ugkK+iFNZLljVMYNEaAmZgNu8Xv7ZXbXGhojXcqCQVeIWpe6k8PoVAFuNpa27LYA482ui

nUUcKaZv13aSQgaMO8XTz5zKlJYJ/jBugBttN0cG0LK449dNLMqsbUKdCCXAQ6tBb4Qa2kig+lJ76ukW

SSaj/sKDNdXUm86QXZFrTrD6Usx2sLGv4HEoqnxJO5
KsvdsrSs6sD6o3yS3htZWKywSWhGxi0D+8sSfwFEAAJJBlE4esOC/jz6lxZw6r5JDNcI2vxWI3i5JTFW

0zhgmdD9Lmbg4IIdmAJWbv1SfYtuv6Szfhjp2wJUsaeCnQ3vnoDHzl9FFHIouYAYSdTyEXFNOBTCgpZl

dMpgtOg9viJwe4MmPOyrgkE5PGlrBH6gdUlCaBlwiT
u+grYg/hYVGfYY69/+aIQuT++D/3WPJXOFKyEqHSBUYVh7tdK3CI2Rz9EKNAg0X63G3Lr9Y+Ka63YQla

d3/Oju2njguC3BbVZqLX5u2CWAWAi3pcPQLXQvyOg6InM6bCw1JS6kcADeZBcMh0Ar/KjQCJ1fhW3z+b

ox4FZGoObNAdOammhNiWzWGaioM3VqXXbl+aiof//k
yJLl8mh90CSkiWSSfiuktq2WdDmlojHqNT+ipAbOsw5mrw32U+Jva722uIriYAzCpv8ORCuvUDUjR0ek

gjmRR/J+/wRB9qJzl/4wY5asNmEVcx36ul4947P1dp2823YprV+XaWpYhXI/jBCLvBhxGup+eDiWRQWA

E0PM1vIYX9iAExxizk7E1ua3+OE1ud4j9lTjRh2yZc
35E6SqaxVcgPC79O0AV3dBHjanr9qlbkA6aaf2/0Ooq6YqIzE/eVGSB1EIvxHfZ95evQgGluXVuTnlKJ

4uVpxN/10RSr8VkrEO9qbxzAPdtyp8rHKn90Q6skl9ZOK3b5s0xPwbab9W12BEmQ40uR0zmlj3AAtyk4

5l5e8MF17IxknVaxF40euMBSk+U96odQkBxp/mJOtH
nYYXi2kVNlnau8EgJznIuyxtBCOHYe/m+KCunZPyF37lXt6OvziazJb4y9Q96F92vdLtAB9BdqGbJSFa

VpKQReVZF3XKlmZQP9mBsBuuFTKmSpjw2/5bgi+LRZ6GeXfTi9EVqgIGTfZNAm37oJzf+zLDfEq+wrTM

JVEykTjawjfn/4e+t5fMMCXXmkBrzbo874RFiW9nuH
KKAysfIKi4hGx+9HwAisAg9GbN4nGKtzOfh0R61p/edXUbe4LYPfhKJsPVMTkJdjr4oOLqeUnlkv2Xm8

BMYjTliAVnakd/561l2vGMs9xUHP6N9ymUjWKF0W1Gs+LzyY/wdAUvyMUciG2f35wIOkYuUO6mz3lvvi

AXc+XszLCVZR8L2TP3uAxD8LkTWzKxD/xRQB0rzDQO
JkvZ+5olNH+FrY4S3DFo9i7MEZojVzzfWUJ4JogF/Ixjxe7HXaHwmAf/r78i24zE4qNztbcDdAN9Qvxk

RF4dNNFX2+lPdtXaQphXYXNVEBJZYWYOHRhYQ+LabowBRjvc1OTuAptIyjFF4uEMT5tt+d7aqODAE63V

v1os7+BeTj4X5wu3TbaF2ZrCzYRcrxxuflNYekwwrJ
H82a3G4uvj1/AY23LYMdsVlqry9PG61kYCf9AWShZDkgb60dU1heX1p+iZS+jXqsSn8x7Yq+AryZYcPM

fk+y92fiiQxpkiRX/paRJr3mwI72E/LBnuYtuxbTdEdhJNIEAah5+fSzBnNkSKCpcR9H5fNVcZrYc1Vs

qJGTUdkBjSFEI6wb6kaPu0BoK4X2Vt/Kg62f5vBk42
DeFnWzQlvAnGYrcca/yuWECxd83xtR5v45e5YUnDJVNbbxTrSUWA7cXeSBMN3w/yMDUR2kPptAKG1DFe

HSlxurf9jvWYQi8TiGyVT4HESJs+CfizSH2/cry/uP5Oiczi4JfgDqNQT+92iWN7LCVkmXAWHOK9Xm7q

B/XHwHxkq49vZSy6PajX0bBkPYd+feACikxMveFIcM
4CkOKYKBy2IDPzcFSqcKz5mYqrIh7q6IS3DeSXrH4Ojthw3+6XYEGh7OwYUkxU2y0Ec23jKfyOjQ/NGq

rZ21Jq+84MVrnM7V1gqkp23YAPN+n09ssPvkHRav+Jjmh6MSTdM+kWC0aFN2JNUTBU1VRvudWrjuHjES

ZGGuwwxX1r/kZx72s03NVkbDXyzoKnZPZDvQozKjqS
g7e6JhMxt5DGBx8RFuKlk/wIsidcFmBAox4h2vwBPNbOxoCTJqDVKuyY+3vnqUmXpcJZ/7WXBQqJNSEZ

UVuSTmfVShd/KtuISgAwVvs6hPukxJP4LBM/vsFwrpPuMzF3mBCyUilGEk+8OmdgapB6ZjYP31fmIp2Y

NN7rLu0HV2qgjlLb+lN9BGeGUWFGSh+1IWd+9nwC6Z
vyD6mpZnoIgXslSqHGZCRrjvbwDr3GANh+uT4309naogX4GVStfZ2I6ybhu9WI/qWPW++7wTb+VgEuvq

h+UmxtDGcPqx7asUguO6m43/GIaGqZs7cowA2UOBWWXO57ZrBuxjJsuR08hCEUmKhmUfSRKNLsRxwSuL

bZ8N9zzBRT+iFn5gct4cUM/pYiJ21iGLdR0CqJY8cE
yhCiEbqj/RCr7jPMLfD4Yrgne2bsAUWf2CRXi7RPBCFkJ2CnuB2Pm4/3HL/B07XltzdkNGgdNMTrEVN0

c7OJUDc/XsUMVfpcw8hlt9h2ff5nKr3WHuEtXx2QMFVm14FMxR8HUsY+aJgvnR3rZu/FCyHMQlQHMN+K

lu1lL1nz+0io7IpI8tiivlOIkNzmJywEOR9NB3KaRf
f+f1XeP08kmQqCez9BZcvVAbO69uVLOQBbB0vPNPxFjIRH35bBRg4XhFoXsU7zv2nbyAuBqiWaIaoY0L

RU1u19hYiyWvrte9zlcDiUJOkB8NwjFF7HH4CZSOrX7tkkpzagQE27/1C592bEWBlyfGat+FmUT7z/ji

GJ1cz4rBm6nYMtM4DgHhVwrXrVgGqii7+0dfIG1wyt
5/LcNnvdpOXn9VkrIsS0f2YW+tgRjFcxp6ZtPBDpC8Al6ObofZ6FRkS08/radMdLI5Fc1moNSbBsmUv8

3guesttNYWpPNt75i5uQ2vEwmhIjY+M7n17u4FQ8A9abE5+VBgc17xDeq0WrNJlPLV7NCPCQvfGO/KAU

Lo8Mt6QEnck9o7SL59jWt/F9AG3FXaLaRUOAw0i6G3
N0an0oRW4pmFTt3RwuvwP1yjsuleojCcRpar4K278c3ixH6JsSnt3s+PA3ORDAjoft9rPeGgEZc4AmHA

V9ftxcBpnbQ9hZwXT7VCJ/16JEUb1hv4+XfhEwyG/u6IkgFPpxyVaCLxFWRw0kq98MKljEje30FZ7Ur9

OlinuPh8WhFgfz/PTgBx27sGkTHZCzjl4kLLMPKYcJ
72hyLX51CcNB/yt0RC47136UgAvQJf1KwfGWe6GdOOFR08nRrOjXatvupx8MEDFS9+boF/1bVpZY+t3U

PqGfYY/wYLGmzEE25z7V6bwlvM8qGaDIgu88cZgJI1uAB2f3dyrF+Qi7tYBmPkXCEef5xvYnJwQbTBW7

OPKCmxvWK/8QafSkJMHj4Fw8yHS54vUI94heCW3XD1
hNKKvlP+1ivNqBbHy/5ez7c913iJRYWuw/rGre4nKSCinQ6vw0inMV2eEDoGVIQw9xo47y/A89WW+pdt

7YjqPHrNPQtVAG3GYMzxutz/SOz0TXg0pSwI4jSywALChLDja8UdysyAYe/eK+uu+1EAstjH4vklCkj9

MdLyHGsmUQXnaEe0DVPgdjGWnKjKclnb6BIFfI1k60
sqgTr5bBwl5JXstDReI+TRT0NXD7m2OMU1c7Y79Nm3v5wgjTo40+gy4Y3WbeJl92u/UO0Q1y7lIfOQjS

lB+YDa9rWjYKpxUUhbaUom+ijPZsVvU4kzi4PJMGiZofsIQGuh9GVLxdgoB+pslEv3A1ZaMcBWk+M0eC

+5DlsXwZigUeucbwv6M2/kWqZCk+vfbdsPwOcrfg09
Meng/mKXxjjqtginTNjxdLn38cebR6iFiSB4T/qFo1QdEZFqtajFK/4Sx2fWccTT6BeyW6G1O7ISYZF+Oo

cE3sV191XkCDn2isM6RHF6pALdCwGb75YrNGY25R4ZSVHXXW9glpU6VAIrCwznxz2yovbQIoVSD5dQL9

E8SulcCVjnny/cqzpnHABaAA4xyrh3PHDW3LV8sJg6
cGUeRHzeUoD9SaqwmE/OlSPF+DVrzXdv4u+BbYbqKPr/aJty+5SO9Zcu8MxgwuM5+j9CGjvAm8Tblzdk

zpGaYrsWL5+dxey0ecwrAq4TaiavtoWEsd68H/99Y3dwllVEOEvBN0hR55mt0A3sciP3n1Grt7P4v//S

PQgaI1E4QDlANZKO6/iWV+AlzZWT3uf+Yt4oiDqjob
h5DvMFqtv9jEnkL2Ncl3ILOMTYXSLnFs685kWVsLep3tMZM9zgpEPa1THoNhUUOglv/9qCjiwua3/74X

aj/ANoCXFMCxT6t+q2HXg2M7aaqRU/yZk2TMpkm2HlkSilPaHvvqkp8melZEYDywvC6l+bdl9sBdTlzw

fG4xH0TSvxV4+X9FzI1+06VSsCA2b5jA4hNVq0xILM
7BTZ7y0qunpTq1PKMd9pcSCqu10PmIW+jhAIN28a1cMv6oJo53D8KidBUE99FOpGtecqMYH1xf+L0fKi

wH4qtBW48i/mB+MB8MhedCNtit9jvUlaWYxBVS44b2LXQWdxJ+c5M8lQeAtj7zNsmy2mP1jMHQQPs5bT

AL8taNHBtiIJtkS1yBecS+p+erbPLodTIBPBe95MAc
ICWkxgoqKjca4gm4HSbJ0eh+xN7fzZebu8rL17NiTeTw4LciTQUBpAqmIMDH7TeX7Il0m9Hquq2bgEhx

wVo3D9zhPUHenIQlsIpPmBl+WwThuB/cI1w980R4mbLE2ZuiFgeFymd049NepaDPE3YUCJAiRNz3l+in

r+KqJ5bZsRhvBLy87oQmHSo8gbwS80188MydgVZqch
Y/MMXVBnxHxoMtt9cNM+ilek9gttNL3uxvrM0eolQzNC5xwwplTcogO00hFAg9eF+kKO3krGrBuhVJhu

yGheWoelkeQrIXqbJYiva6r3bQ9yh0qRonDa4E6egU3R9+rKqzPGBDkLPJP8ziZ1+DqlQ5bqv/tj9EPL

MpyQ1/pd54PeQwcUqNCseBX9KzU2cizFOn+IWDEVWt
QOYyY+TiAznM2nBi9JTS6VcGN9B9IoR6AVHG/J0D44WhWQnZxTibtgXITjy0yhaYR7nmdDXUNSXpom4x

b04zG13yhGkojogJFgGxpGQLtTyqHUneKa9EQgJoEHz4GI3F5Y9Q8Il9tIeWi2w5VRRMXLuaqCACVivI

Lpf55sNU/ekYhyAFUp+GkqonKlbQuanjR03m3Np6Od
2Ei+473Urq0mmpwSQPa30yz36GcYZ7hd+da8JUGZA0y5/O7fp0gMRdvGnuj9vlCqiepuM3picBiB2pa7

DASXZu0uT5wQHh63qiTMJCvii0rT1lTIU2zm2C7U0mv9y7Jjj8YPFq4TSEreBvd9v1jpAwk0t0pHnrnf

ypBvwyRRl+bZmz2+/bQC9lWBnbEqZH0fEkkIoInY6p
inP3gMEXU8HkJPVDabd3mU8JSGsjMboMkIGupfx12NmmcQ2WUmeJsrtz799qykrYQOxjTyxN5xpsdQJG

IfEBuRscgZ2IHwBmnaK88t9oAydmF+S4+2HUNrfhAkKbB07zjfZzPt8qxje/oGqkWzyKXuk6qhj3YYZb

S3VGlCnYl/XfWZfYtstMIIHqDi8hSvdZy747HArx7o
GlmjYps+bal3hZQ50s7yUql+8VmdnxD4yGbjIqoa/7wVB5pEwmMMOTq1swrSKkRCZRONKo/tGLJMMlec

uT/FCiZJBH4vcg45E4ngm6TAAKNxoqT/FcVWT+yWQxc1cJ/WTaWK0HqZay8Nmssc1JheV9ZsHrRHwVL7

EwFvzcy1WTFJr1ePqSwysAEsB0HSphRZ/og9BZ8T2M
wDb4vCJ4D/xn2kbrPeaD8umrFL5A21cogKybSnNo4SI9cAGAq5+zwy3GW1qbIBpOi3iN4ZyxQUi9HEMz

kaMgfR51ZFyrewICB1Jwa9oKzO24DyjjyRuccywQ3Tr3SXPZd+oPxVcoZlZTTXBC77in7XGOQXqwoUZz

5kLR7dWzeG/mFIge5k1qt+uZ1pQTb+GTYCxppOnc3X
PDkgGF7I8D/a6V7dwwbPiFzIAncjyRnQv/jFBlIipsdZxcGML8gO+E3oR14+GPzbQyp4GeIEBonzgylC

elnn9X2/pfLj8HaxsoblOrxmup7UtehwiymMfaef/fv7IalMe4a2uq975wvUCyTRmwzfnZQRaOzbpaiK

aliahuElqJNk3IImzj2erIe5003mpUDqIOFzqsFywi
jBwiw9dAQ29I0iN2vmbCXPSYpRd6lfy7tmJ0n6w44tclbZow0b/+UUcDYI3S6rKkMvItNa26Yd01gQi0

Sbq/dH/fjUallL8OWlK/t8i5PEal9hYrq49eHGcm0e+C0++CPuOVDVaq8IQ3R+9gcRmb9bMv6tTgYh+u

AL+pTAW87elHJlElBfkuL7v8JcYU+xyt3Z1nI/+zK+
5dyv++jvVf/It/8S/+xb/1y9DJIcETJwEPTi3AFtuyUVR9Y9W4txpGdKk7jOdtOEq6YYb+YypruCaJvU

NHzkzU/mQxm1KKs74eTNvmLKISxm2ezeCtiu8wZIWeW8NaCC/MR8kvDee2r+0gm/EghpyNbJjHF+s6BH

pqOApLGAGf6F361+iqN2Ipx2Cc1KtPw5n7zyqqT8ma
ytw62cnncDOksayfcxeNLpC51Iv6iVNa0dExGmxODuXLjI5mU3sHX4GEwNxpTXJMMzz9f1La0A/9zHCJ

yG7E+aNRrrRAPAzqlqtroWWXcbTgowbTidOn69B5Q4OeuZdXmE+qvrt5Dsl9kYfFo0NJ60zqlAWL//uQ

6LOgAskhZe/h+xXCVQ+Kelvin/f3XdMQs0GyiA9PABZIj
hTi2mJ4fK/8z6kzIzFIm7v7Z+o4kCiqIPcRVb+MWVuw+oBAnHIq1SN262TL/hxv0mSWO0CA5w8/YhFxT

uP9hV63q2U98zdYR9x4Al6aN8IOHWoC49V3yHErNOS4mhBPos2cJLCT86X9i6keeDa+HaXw7A16rLF9E

vqP3pRh/889HiUSpIwSmeVfMDxPOn0bQYA++VjWGvz
DSExabxT8aGUZ8Znvx5gs+cPgDy+uZGcv/mvxr2u/iARZ8qdSK0gE5D9N4DEh3nEeOQrP0f7e3aSUgS9

hj+/2X3oXyz8I3CKARcJvUx9DmvAGtaGVBMfdzCgZh/Hhi5YhM+Pg1brl+3twFA6iviLSuwpk5JXgpUe

do5FVAahgF4x729+Iz5ba19GkEEavEGrJ8vATJr0T0
h7ea7Qh28PwNhejzJvIt5P7quwA4SYAal48DT3TNDoHwJbuvVFJda3lpIFQe57TzfxBcu415YqGuqRnw

s4JRIZm81P2lK/PVIgwnpLv7m1++uGhqFzx7oyTTTMJQPnwRLeZtbgdX72fWFc8h0dyXkUA0YpnHxJyS

lBsNzWOcnxoaRnm+OA8rMboMVmnnAUFUTmZAstXce6
+wDZ2mlDKda9JZDPSu1d7ZHBygdw6crH37oQlFms4XySKWhAIuDtG7q3MH7HVyPCdiww7I6kh6MjSag/

7fV27ak+H3YqpMwrQqjSUoNcPp/5LWaI/Hmpj54XJgfL3QbcZUj9cjON6rZ8y3M9rShGJSxe7xRNS9SQ

HhRXhmB2QVd85bnborl2ubvY5MwD68cLxcnTeCx7Zj
mTi3/rnZmTh4/ij2z6AYJJE74lsAgs2wCBkq1EtVFw6y9E2eEtr7cnNXTWP5q9VbR5jv+oYIIkca7pBG

PAdKquXiTFSMffK0R2x4H5zS/uUAJTlUZSAIGMQhkS2IHqmbbQEtghAx/KYpuyUcYHfC7v9tAr4FwWFh

lng5v8jVaiu+KwMY7mfciPO8ky7v7kF1ogmVNzHKKx
Tmjq1WCAPP8UBjzgdHtCMoU0d6jYrUmObrtW04QHFAqt2rc9GjfBfaG2p4tEVEU29x3Vt33cmr8PDfv/

K567wqgLLk9VNlvaJGFGAuoadKOQD0bl4rlMVj30YPG6mHF8drs320aE4iXfCyBlU5m96leJVp6kNwKK

FXxdQU2sVc4+Vm9SRk8gaHsWah9LyaVX8EK6Da3KMC
SEsYSNufvJHO3TSyisqyC8vhA3uyceQD5HGozKomXRxN1TiBjvpeBMFG/qDEIYL9IkOgYCcKngDlWybL

EeAAFrkefwDlign1rZ0GeTUwwtsgC3rEqdo0cIrViGFlZsek7e8xWseupTlgEvIbMYfe/G8IuHmcwmM4

9AOued0ydLo755cdWtbh/kLS5GpT+AhEUuuvGaAjZf
asrshA9YXcwurqFn2VyULfNQIpx8CN8QFOpyt4TBlPXtwiX2Mq501IMnY7I7b7KpBqNP3eZaTpmHlJh8

cnFxeBj9iDzgED7BYJ64gLCPP10ThMpB7hUUXn7wpkKA3bHpBuYNoA98ExhtJuT5MO08ebTh9QUC22qh

N2OEDb/2XHl+UgreZ4stW/qvtZjQgO2NN5ul4c7xIX
kWFqdQAAMNZfS1yYyJOkx9okp020PzbjMT/bglPnTixLBgn5giY3P2iyuU9Lgx29ecPFyHotBeZdmooQ

+7OMFKT7fYP3r8M8IyJ1AykEeeY15/OznWV+KvWSWE96dcDx/Hj/qX25+lypJbrs9VPKgXDGhMlz1OXT

eDqkVAKaBHpYmkfPXMFvsrm0Kt0tsANFr5oz03T5TF
RU1o6gXWF+eenxgED0D0Yvo3bLmpJeiOA16PUuBJ8IAUUBUBnMojXQoX0tkF6dQ7HF2ruR1hR7mePHob

/0VNSvn0sRQPFYsX1y7BxeQx0Y1CU6VIzl7dPhdgUtKc0D9weW6iElVCtI6ExTmbGgUr9S/iARfATiwm

E1q8c/z4LHnWXsMv+/O3eE/c3JV5fqkyAVM76jaNKQ
d7o2YClfgldzMQw+XvW64uEJzbZ0laBFgcd8NcpWg7tnYo1vRrArLsd+mV9cO58UKN8AfsLvP0NnnQB2

NakjsUfquEgqM1NHAUS+63bWosKXUPIcbHDCkuc9HizBWrschMACp21qVUYOjlxh4TV0uAnLclvbm/DU

Y9cvoWOLoAktDUyPxo8GdIdBjxjxaJs8mugE7GI+JU
TxiTUkiAV8qhRVA3BSuOlDvuefpFAUoiHR8RzfR8zzStxRsULq5e4IZI4CuAwY99b9xZRGqNVkuwPJWS

5PoKkq2vOC+0MH3WXlHfNqSBvAm6eKlAEaa1tbr1Ev1P5oztLVMyfIrR7CDPejxUmLy/jBr3wc8/rkGe

8t3/bjvB/n3NhmcFpu6/rSs/1akeFJC3K/O8x7oWTv
Chi+2bsD0KuGhOjK0y/+sJbPR/O6BP9n6IrWKIgOMK6RrjSl8e7aQ8P/4w3u1yfcwmf2aT/arD6R1WRn

/mRtWataFrJuRvH32AfLjEU4s289FqCDCweabv/+Vc8V/uX+5f7l/uX+5f7l/uX+7/L0lZAKOVa5etsG

+jHeromyg7+EGSG/Q/32sw2QI/MfD8mbJqR2CnfAVl
lxmhLFl5lbE4OQLZs9MNosNZ78q534j5QYdWuGhnc+6Bvqu5OEGAeXJRHN4A3/fqnEhFhCeOIwfc4vBZ

DtV9KgJi4fm+g0A2nwgfOQKnlQpommgc1PoUVxXxSOA9abIUIX9M7h4SaKsiT3riImMW/NNGNfakwPuT

GGhjxROMmqg03vyFA/cZmVN5VoLe+Cg+pyDG28Dn2N
Bpv59pl+rR9ZbWRF5gdoSyc7gwyKlZLHxDhwrsD3f+9G7DHhRiazzXqT03U4AX4EX1z+vUblW4T+F7eH

8n7tGkBRpPt90QeIfseUMMAuC53buZpV6G9i/M6/Wbvf12toJH/WdvNO70ayFxv34aTPqfWBCt9aBvqG

78rw6e7Fgnc+Vd8BPa9z35EKbeXAkMYaAOgSC28X/T
niAhGRQ4mbk60Bz7bLNk7dyTn2/8Tn/rAqAg1InwZKcd711daY62V9G/Q1Zma3b8TIhKpA3nau3FO9+F

1YwlwjJE0p6JQR9WvLQj4hrqlQAzEDePXf8+1WgUMAqvk16XJORt2O/edqpzuAaP6n1SkDIl/zpE7X1K

ppQVuhD114PlSLizGH/7AS6xsFabJad31eGX+NTNww
0OlJMYT2ceBmhKA6CB5pGffg2cc6ZjLBPBrSZo8F4AxukNh2eKzBZdLlR5BJx1eMaQ3lwBs5T1rNxaMa

4a4KEH+6hs2N0F9EgNdPySVyOUgHldNao41C3/xyPxG/V+TnscD4ML55bNKfMWq4hnd3Jt5V9jetVSAm

J9EsuvPRd4cLYcQSQhNMBCt+XX1IICB5YjWVTYgohs
NA8UNrTvvEeKyepeQ5N+5Mk1XH0z/A/d6ekCJtje77oj9AiHDsUgfEadonFCrFOP9+3ZS5wpd7gMYFvk

+xpHZW9x3KFFwwPUcvzcpeo6F/5Taujl5MAUBgmtwmtJdbuc9pxem+7zEObHmPFnE+6Hikvha/Qqn6Q9

ugF9tdFUDfHJ3ucth3UKyvu/cH8IXR9WGYQFTgwoY4
0YW2T8RZ6WFKowARe75JdRuaqNjITK6pS54YVdyQR98RZrkvYXt3G50Nm5kLcFEf4AgFpc6WYA7afLOG

xhnjicb/3gS9VS7rA765y6onoHxwHwETVZY0M/qzq6+qBgOfIRwf5k7PjOYoJ9ZPhBXEPurmgLW7JIsw

q8Lj7MwfJ1bNDdkYo3up4VizA76YXLxKMYrmeSRjfy
ezENunUt+/1zeoskxfYF9Ciid+qCtn5m2EjyaPyocqVsXWjGuVfS9CwH9B4JQbIT7jdgK/bpCSL6rDCv

ebS5Uco5Kf7UZnoMgHf6dDm1gverP2yNK8L2EQmYLcXgdeHBuG1mVewbC9a9bGDamiBowWI0dFvlptZC

FkoERwAqj23zRbRMW/3eeqjXXjzHEL/xL2rVzcR4Qg
vt3GRXrsxOIK6um7TDWNv8VILEDrleaMb7sXoh7/40V6C9SQ5ZToyWd06/BTSVMwNGhWD1dqmjW0zrJi

BakNi11toxfEPXbewR4ynmSwuJrpJvycd8gLBrb0XphAEBigi/V8aRVrnhRSt6ceXAv1QsLsZk3DJ9At

yW9b9wPM9bpM/a7+/wHGx+t2iZeG+BjAZ31FS+P0hR
gcczkuyLUQquNqB+v0xO6ZMD6owLPP/npmtITj5Dp1zW3nMkeejIpNjFSacnGfqah1Lls8cgOCKullWO

IySgk8NmxZPiZYDeVHEScb5ZLpLggjlqEzsL6uLSPbXFhHT+A5Y4JfmBCr6lQdHUrXivr+nZ5Qe8DsgR

cgm5MPXv9QsARnrUy0nSYfIYhWPuMNHdvWssWSNNYT
FWdw9aE6wrgvqwr/mqidQthD2J67F4W30b733F8g/rpxrJatUwSNgmLjoWhmKqo7mqhF7C+ct68Ki3Pc

q17bn7QbbUIzGad8Lwo+ZDzS+o4mhDU+MLddplqhSOz/BZgi/7C3T+c3Ixr/sIRFOKOmrWGtjyIx1dZy

H3wO1CDYPFLWGHDMjUYZRjCfKZBK9hO3jaaKa1TFea
tNcbYSKufQzck+3c+LqH/k3EOg4mbpcgLqAqRvFKTMdJKyxqsQFrfa3SVopA0YgHp81f9ys8WXkRBscE

QxHQv/1rNr3w5txzo3EdzblvKfIpAEMfCmY7eFpV95BqnAKhTxDXvIe9032YI4UR0RKr/2rn3avZyxJ7

Hdx8WiZtrp8L1C6ZS47wmx6zc+hy7QIRrcXshHxzYm
Uirab6t8QXiQuyJ7O2IOEz6E0uXX50rJZ7sDujfWSM6RRmRUis/hnjB0uPTXZitzWjIJtCVu//o3c2R6

F1CGQqf9NxTdHUXST6v/TXJXbkntP216KD6dXFh+w7F7EXVoD9te///hn+inTRnbeOS+LztcC+kfUzq7

dTgQZ48CQ0QM7btzjxXTH7SnKmlEUbQJ2iIWwidvPs
6M/6p/OMlUxYF9FksuajXtIE05bDwnqeKsw+4kxUcfn+1YAL83qVINlCqZZ8eKr4DUJvmcnb4NW18EJd

kILqjLJ27aAV4/AoByquqUDr0J4TvBDvlr2F4SJqm26p0qQ1w621GjFQ43b1pT3Z0odn2DGuxtEskhYS

IydyepiKav0Ua5WpdODAkUQ0U4ukxPLVfTawg5yFDc
dFrUFdMVXrf/KcZ8rZxXdDvYAsw4cQ7DDjhiffEPM+pawYUiZsmgnynSYyIZbVvZaJ+5+tnd9pv/9eAW

TVu09h2SZV+iKaxnxXoORv+/kzqpdiDthS4UeKcwj99DGzbZF56lco3+00m2aBaymvvjEKX8atVz60Y0

60NNK2Im1k/zA2VF76WlO3wg6M+rToLUaB+Qb6Geo8
7/ftgR40W+GBPcywJY0vcXahMN9H4u+CRxRaiMuygMQb34W7QwREOr5r1jao23VZHiuh5oy9MZWgdkiK

9X7xWxOGHlzCnaAZgi4RXvR8xV7ArUHnEF+Qz1Oop52RMA3AI5ioz+qAItGbVwl/TzFutWcClUrfZldX

lvRWjHkh+9I2l6iw3As0LOfSLRIaqqFNRZ4WTFGoft
XOb10EodYATioEYZdJtAV2rf1s2wA6yBdAkODB2wII/Gea1hgPc7MePn5smg2puwKSGxGh1NF6XMj6k1

FyflKS4pNbq4Mc68r0hqU/cO+3kfeW4KoNNcpqmLgt14xE/LTnBgpmvVyySEsEhD0WZYUWr/uE5RvENa

06kTFaSx/7QEgt0KVNC7ydfxQSyvNPqY2EoutiaXl6
jITH/YNKqwXbY9rxnphVHFb6dx/J5fDroMe7AYXn1gvgOIAC/VpGtgH/spLgzYR+Qrgpok2eAWAs0z/Y

WD1hu49xuaxUfd7DDOQiXPw0FSkqWfyatxS5evvB3yoHhJuqDzqHrzVD2iopldaNL5uGDeu12MhgShPu

WEnOv++MIH7jc5epugMF3gimQpL08vXrzS7kG6Yshl
C/uk6NW+8pSd8Yyo+FbCg6pzwQmByD+YVNDAwWSpfKRJ1LIDyXjrGv7DDZZ5tospRELTdk5gLksGtJax

Ao9q7Q3KGxOKhI6KyrGVX+dDokrGQ/PLHXrQQJ8JOs0DKjkOBKF9AO2NkDL13OrF1w1r8ZBcvHTzbFvW

deqwgmCRc8POuY31MMZI8HgjyJ/jIPOAvqkLcih8kk
736DZAsFFjtQjRSVTsK4de2r3gNHGDR2mBPH/JvGkgIZfnt8Fszrd0UD4TynDDAygtamzKbGoIFTUjll

Y0OMdnzzPrPz6APeNJ3IGkZSPHkklsp1xt3oNri4EqcdqDgy2nb8Nhs64qwlVSGVByLcyTWRcT3ZF+q2

q//gDJCDMwa3zle/q9RG4dkdz0IHxWeRoOFldHYK0A
sdOKz1hrIP0+UB9W63VGCC8up5UiB4iLDIZB0Or7rvfVLsMtWwxVRm7szjmWllanqMpIwvml6uKX4MYZ

Cwv6DiSPWIr1GBPNIaqT1zKfC7GafSmQYO4O95S9CEAoYA2UV/ckawqUJKQTwbA8SokCCRLJEjSToy6e

6Oejm+GHNBMCbnUIAq1R35YtuujelUEiExCli/0rJ6
SLP+XA7dC5NZQpW+/vt5tJ1Y8R58PLKRmZur+j8A+vJxl9G+Oci4lamzyDj9fZb7i2WeCMH285ZCq0oO

tzJjrcp+fUuQvxF6GxltQL98W738tHNUEt0YykbeC6A358L4fMGQ/fcy2ShaqpFBaqPcVSwf5s6W5046

XPICu1x3cne+VS/A/WwJpI9A2/XJkgM/NTH4snrSOq
fuJJYp+kQUWWZThx4ccH7xTu6vmn4yrEkCTGiPTfkcQFFWsZhNk1i45y4QVBs962pbqDgSt7EwEJ+CBJ

33chOpKLLCCX9hOOq6jAfhI3P69Z4Enm993sWfcImjcaKgT9axRi184HgEIIeVsxKdQE0eXgk4ku/u7P

nofTufell92KjWBDx5NwEfNjKyPwXdn+4SYkVK1qFQ
eUDFIBUx3/4A2OzM+cyYTQxqjJH5Kthl+3tyuAuS1ho9R8DFDcol1BE+/mawmhR1anZblOZ/eSyu1lbj

ECvMzlfv7aOOJE2l5IsrKAQRIHzl6gqRZG9Suyl8ULhsR0Tot/ZH71XBjtpbLyYD/f6FK+BJjU1ubEZK

OZa2Wfvsr/ZFMOrkdful4nEFxD+x1uqGK5PTQ6E7Qm
lcN3uc6k+nxQt/c6bO/DlScebvggcDwVEDqB1ZTWSC3Kvc1avM4CJynG0R9MqFolWBYY1Qoor9++8aJe

frVfouZH8e0Gc45k0nJfO63PmZkBwpnxAfwT5riCW2rvFTrHSrglw1OZaCWsg1X8PqJ5in3Tufnqy9gp

319WYE06lCwn+Ld3nXAM/tWeGcxepjoxTWR+KVsZTJ
OlPlHHXq1K5HDLNOqHgF0yKv4uMpoA/Sw3IIFmtoNnktNBIADQIGMTHhWPjhfPFntRJB9yfBvPwvEBrP

iQju8EhwtdhTYpiQSgh1LhhW2ArP01ms8e9P8Axwup7pasop0J3Zjw96npKPmhkgtCavX7OvdWz464KW

qJpq+8WEjZPLOTZQtgyy8TC5MYbXMiOW2wCD3mIl4j
j3vaHJ+KR3gzyrGnBarYLpn0yEFAJjtbHB42l09mLX5YEsjwuCZ8wp5GlmGptCIeIOlT5CJde182JIuW

6r2j3qIkbVZsOySEG4f9DwvU4u3NJZF3zM/WHU3EaxEDDokJs+1wutPpzBU3iIpjqh+2quVxgH/pTJ0r

oJ63MLdWbVBN9M4m0Gwp2POpzLlXmFcPHhZpASures
ylIYsuNikLZWGKnww69tct0Uzfo2zGv+OgHakhudv3jZa5q3rrwlDLTACcfWP+MkXEOMlutbetaYnby4

6+XVyKc2rKMMBViJmhVkamXrwX6E6Ta1fK+8TWMoW1AMYLaDdh24VrA7CBY4wFlpHVfBQKq/hpQV0IQh

fj1CQDgcYY02y9NUeVGsuCpkUIqfS3W2XurutTXbt6
138/nsXQZgPTdN51G/89UDcmwqFh4nEdeAv4CF9GuDrcXzVpM9jBpsicuCN/LKok6tOEAkmcTiaSiIvB

hwoP0dk3Yvzmczw/YJtDqGad8ILXE8FjJe+vpxRba4aAsFEez/djAqVds9NZLZJ8mcw0lJEWLVRp5Dc8

slf5q9JLRzRhmxZdBQ2HDS+E/gmXdzqMn2U3/5CarE
dA+v/MYhbx71duukfqXNfOFTMX20tqWV8+yzmggL+vmHzZaOJUIb3XLGI7fuSJaMpoZYbYfsD+lfDkgg

6afHLN+9t4qBQ9z6nF1LPNRIHFfz/z7HfVj5clerEVGxXTNnW6dppUQk+c9jhzwpm9INLim6rJYy+jya

gFnCchU1SWvmEnQFltbkA0my8iDpNQgJB/24KU2avz
lD2hP4P/0ahgn9FXvBckntolpzFp7YO5XXiaE9x30YJZyNSjvQh+vEPvXGTQkF4SDl1fhZ5f9TudOlft

fVnWX8QqJCHs1kcLRDXvfp1WEeZg1K9861+q4DSHZ2ifNgVE8ni7Q6YnT3MfIHWF2n4+qDwWvX4b8UbG

VE7c3tpn32M93KvFe4evRxRuqqI9bv3xwAh4Z1Oef7
03/KHCoI5hXM4O4f9S/pZwURrkzNNJCXdpSXjkUBbFNixsYdAZzdW1lzyQJVYkEtbB8GsvEFcw0CNBfK

YugcazVoB0A5uLEnugv4huTmz5YSe9tn3o5BoIG+1yYcskoWb3zMsw+Meng/6u1NSDqWW+AR12wvSIUtWz

wVVUDHv4pNqDeuL68Z/yZ8Gw6AtJLqwKm3zCdB1XrK
NeQ4qbRzUpN6B5Ey/O37X4IE7KVIkqtsktc6mviUBOajVzdxbQMlWn6iwf7afBdW3WCZftln0PuQaVeV

b5x1c7W/KuNPpWzLdKS/cLzGJtYG0zv63m7dPocTDI/7RIPUuuVYrT7OThmynxPUpj/zwg+Lowell+CfZg9

JjBMrgSfyFMLXUtvwcAjupEPKw2EjBb86pQj/dD0su
+SIByS37QwiC/U6Q4Mvofp+WECfPCm19qTLT1Mqs7oid20GNdwOaobGMZxb2P0rqb0UkfaDzuCCiPzu2

nml0f4wHt+xs++VC84lcly24J/UbyUbrhmMkbN7YZ/QYtv86JExVhP8VQYTwiGmolWaeEJUpC0iTr6IB

lm2czyESC8s8i4Rd1MMHQpUdb5ydaJBJGTqr/47Oe9
HDxKQ80Sk4Lfm6JLwF5MSP0YK2sYujCb1R389kGPnacLHh0RMrfLc3YhT4RomKn1uGyB5XsvzbN4/upH

wMEt5evKfDN+wEZvi2tU9HZOpmqlFCga7cR7yjAeRUOo9EQp8IM5fyyKEdFdl6YDDNWreL4Ge4KzqRNs

rwcv6CtNzjhwwIKutdaRekaKxahi0rZxK8bWe+hiT2
G+B4bHNW9EfejQkofYbdHh6IKMfGvzzmkhgkDVQpVbK2knmE63th28xnsxL0yWBl0Aq1TzHvlrq0g1Rv

0FfBm3zRQ3JJsee/H0VCM+6d7Aisg/ppLKfAvjS7Mle1cDNwL4GeEgGwjIIrjeawzviaN1Ky4d0fXOXY

7U8ekq+GQLEIh9P3bs92PE+B/L/CzY64sxn7slM8ai
vzp6IIthYpcVnvLCDF14AWLGvOkwvim6Au+s8WwaC1jE6GT4Z2wI3oQSI2yxamM8lOKiYs6z9ma9acew

K0rJ9M7MioUj30r1El5ksHDfNxGbhcB0CRRfYzh0MHCOqn2h63akCWK2tQhJ9G1n8UGBjcjgaHkVtrUd

em+/f5EOl0ffMxu5IGcSpDIQg/CKrRQ9GEArYF9j/W
4rECfw7PXkNGnUbFPesoG5w4YaYwnFx4uN96VgrqLniXVkj+VnJYvVvFUiAfRbAkGezc53kgrSnQwJP4

L/xVzUhhX2wMhCbwUff7cBDrgh8sjlVHcFEbGOt1L0iSbWKRzeZqY+bjs8LGCHIVxHivjx9uTZ4F0yJb

6wJfBrRPIxZL2qjXbnSKu6SeKkl2i0sBG36KEyeoFl
fU+9s27OlPx68be78PhfYJtBj3fVAuCJ+cn7Vff0jIe64hAl9BGfb4vrzQEjkSQdlZmGwuBFCXtVDrPx

DdVGpMt213Dq6BrfqrjykDUEjT1tIPpTjzCZIEufHQLix2NbdITHjTa0GFwwAq0IzICjld+PNERLbBt3

UpRTvMBYkUC6bLatxzAdEJYrQ1AEGh4o7rT1O9Gmqi
L+tyQUAD+RDyWqQ9i+PPnMdOwsD07jR+LYqtNNvUkcPt/MAkQ8kaCGNkSJcYThc3gEscipvjwR6XZH1T

U+5lyz/0h9uNFeFo8dWLzV/gOvwH0BTMAtLOGZobuy0bKYrTc6RYQxl1JOz3PDUmyNX+IPOzqqdeyxMw

+5Rg6FU9Ikf8SH45UgM8DXoG/LaZQC46zL4pgXXUWv
+o9dDqj7R3VGuo8FpVZn1yMrnM+cYp06q7uNnHPJ9h0Q2mbsdsjgZjwZYi8lVsFnjw1v9MeUekaj10eX

9N9i0V3ZAclgp5txVuFzUnlFOz9qblJX5O5S4jLweVfx6pOukJM57663TBMjjYlEEG8feR9tHs4eAuz4

1TGeByCMGqXIxWP7KcB546gFmv0B/4n5iJpjEnKW3K
k1Rit0xJIBliVa1sl0HEKbD36mrtVyVewBTcp7qgAf3JTFYDpTTDGLmavfTMjBCjzZS4fV/ZbGADE4r9

hW/JVHzGTs6gbJnOAk73ihyX7WjIEgaoLSRWhs3SJMX6I857vUZNuPYgNxWCyVEnASLQeluY5Yal4sNp

Dx5Oq03z9z/iOAH/qorQRvwOz5Ky9WyVFifJTGw2dw
A+Vy98TXTuMg2iY5Ds/cioDbXsKod9kiJA70vlG8BEzBfB1i4GgmHKLWKA8vIq+/IYm//yGf49Htxo7m

Nn4eVmIYrmvWc5zasqAyP/LtTH9QfTffHwoGIq8JciLpCyN5VDzAa9gxPhVy4P/lGketAMxjVnhqp2e2

/S5g5IWBAc8JSCYmNyf6XCiuXhLjJ1fDwAmy58V+tG
Mh/oEsrOz/18d4trBEHhY0y7Yp5enYh0Q1XJOnvk2EgiOzwPpu23NIJey/PYaNsGuyBEkMb28XyDbaTP

qzNdeMKkMXT0YLa38WLoqfSuvU5fS7j2EQaAPkqTQgi9Bdo9p/sp7DJSKbpWgDkTi2gJGJvr9XLkeh2Y

BnYG8PNq7J+DCCRh8V1ONsFIJEmYLOPcX3KDV2lJBl
weheBYauoQSMMAvAhpaJcSXmpRD5gqtYa2mVyBvavpBqyC3kQ2lojZX6lRB9y7ozal/UvZRz5ZL46nd5

5HTwpc2CYjvU2BVzswCffXQqgs8UQ0wNO25dY0F136dbiMcLek5S3g5sw0gDmHUGExpOGVc0FUEmT2zO

41fqn+5ZrmtHYBkLFxBQTn51+DpUUQkFJpxnHAei1A
+AIxrpyXNO2E0IkxZEvNJ8F3p/DstSADY+/zpT2ncc5YAcIbpq8SZF7FL33VGEyVPx7Myc9lbS6tHied

GOhkjtBI+sBgkvONyxStsdvcUc6lt7i3CUmFpYDl1KbVZv2O66FhmIQt/TAkYGYU2qyoSltrZuosia8X

4QMY69vp46fqM6Qfqp01RX62PPK4D0m/f9YlfgtB+u
JZmqYrdPAkbCRxKzUpY++gImqXOaMNShIL7lLJmXX+EHqWa7aaPg/rdENtE7IDUSLjdmLJv2YKAifA0A

gxJzY2bgCI2EP2lfGwoyrWmrbb5O9cU9bHkhz2aSzNpwfzNxOwjhRepGCyzt4foqlX67zcYtQGXSzHwh

F5yxvsuW0K457K/AaHitfSJHmgih+f4ren6SDTsivq
Y6YOysMKeDvwZtSMq+rX21rzSVrriSRFNiLAXvyoE1zRJx6BWu1Yn3vMhfVhx3u5pFILARPh8FKCVMB2

QlSyapneThkPtxceVHpaY39fZB4H27ocrES9slsgAyHtyXHdIIgZg3KsLScIQxj00tUvWMBH3o8SVVcP

U00E+k6oMeXyaiI0Ik26cY1iHHNNaOduINr64PJWo7
Kh21jOlQ1nX3o3hLeXAbI3tFhmh7KayQHokPaMzIwjUZosd+mHioHkTBOh3IncEp8Z0qOHseM2jN6k7p

aYtayT0+9ZpsV1W01GyKFJYCUPKLLiihZJ7GnkF+uR2h+1i+vWx+RkfejE44a2QoMJ2+zPfdD629+L59

oOh0aff7ICIdqhES0eG/7LJXs0XUL60/M1XqBbNH0A
lvIn3k0YUARBRrp2/80Da1vzSDAAd6bNwf6zX5X8b8cRifh0xo6BXQY95WeIC7B0s6bpkzqdAZEtW2Sl

TrpwK5UGzp1QEyQ4Kvmy6W/wB8gJIWmqSqIZh2nr/EeyibpHjy7ixSybrSgO4tXRKl0i7Jw7WAXzIshl

HAhzq1pj/A2kIJM1y/dMDHRSa83KeercRzACkKeAp5
wStlbLzATE9KaA1LY8gqy12ly2j6mSbwrjNzqXDv/PrlW2koeZMG9cu5tgkwSl/d5bhwSs/WOfSblVqG

GIA4m8vPUES6hDA3tZrO3vsz9ZKk4lwPAbOEpHBnMUz3nlmTqyMmWNo9myr72/6okE9RjwFIu6kqu9pI

jU0IkhJWvr840WN3iwDH4O+dCaN9bBKu/5AqmpEwkF
aWUFoPuFvZwt2fPWaHCk5vwdUp2KbdagHLvC/X3bD7kOUGrsTtpjoZG2SZzN4UEML8Zyjj9BOBjgSjqz

VyY+8UxJpwwJhDdwFSrXuMMTHXMM4C6xW2g+5f6aB66+8wep4ECvVLIPWY9ADQVaNjdP48x3tTbVyTki

jexeoNhWR4PJphdaWOaSHm1Y+UGYiOfa3gue6ejEQK
d5GOks0aO29qMUrs8WYRnMHYil4jnCygpks0gy9M2WFu9UZf1/RPCtOg+QNcTZjwzmOaKYgEYln5OtV5

komGxlCbrUaNq++Qo8CY6V3EyJ3gwPnRkVNrh0R/YTCesYCg/UcXdxih0wFb2m9EuDbYCKsQVvfdDDDX

GoMsH/41M4NksUDPb/oBU4dLuPO4lIkCTUkSC82kcX
/W8KL9UrWRF19QFrmbqaANnSHGxIFov8KTlKZD7pWZ+cmE7OPHP1yqcjS6iowhEWbbaEu66q2CMdhtHU

YjNMPfBmF/J4BnSaJwuHooU/3sOBwd8fcPiw4qgisoGT7cXi4vsqYA9fS8/Z+lpyqXZ8G67/i+qrEp6X

t7wU2JAnkRpMllGzCza1vM5IgZaN2U8LijABDLf9Na
+6XdVwMBmzoidMuD1tCIDAo26anenf5BzsBiU26cHymujKQF+f05zOiLXlpIKycxyvN4WeuQdSPas8xy

oTcn/Lgd/Vw88QbkA1v2IAunkBvB/9zGWq4tl4NFlPueENWQFDFhkrmAufBzlPrMYItmLScZNbcbGK51

X9/aSs/ZXIJCS/Z5JCE9f9I02N/MZDdC4VbrHtaq+M
lWHTB+r/KmziQJZwGsb/3RWtf6yYepKdhKr2W524x9OTBsKSqhei92WtATRx6lIAoBzQamUJNv3dBqu6

/g1QrFBM5c5WBS6HJbPMxyNcTc0liVS9h3fLtqrhskWfRgauVLBCe2GL+OFOqkIHGN5xA67fIMYa0A9W

jZHJSBf6dhmetLHn3SRFNH5EYbJskxuid/TzpuFl0Z
bl2ugK8fv5HDj8fDl8jctVBuP0fuFpsH9QdQtFcAbSgPbczJOesNhxGTeHeRd5lA3te8YpJz+OCNrEBz

zz/f/EUjSC8ZUI/lqW/S/3akmeZ8+886um20Y0Q4eIEiCUsXXi6RL4bcm4TmAivnPxLSo2h+A3yS9RUJ

EOJYAdxFMBhhwPAxYaq/NoTfY4sKNFA4oXYvYs2psI
WdVsZvGP0fManyalAOmbcE6bf5BTS71ocx3Ihoxf9I0HZhRj7rIbBsOF21cYjh39yj8dYZ4OHOwkORPO

4jOZeo9C5Td0snT9KfxT0XskRh3ioGFPmu1UPlEMxPAop11ucLqeHo0oc+0SFonRDp7Vzp8KOGzkvllu

J4bHwoigyEcBR3xpWa4x47YXtKfjjctIkge9tXoVV9
IM3YpROwUcHdwOIIlcfo9TI1DOgAqsMCC0Yovg5PHULDk4m/PAI4q29aoRM1lOUc8UyjfkNrytnOAO31

NIYghEESuF3wMREskVIvl03ayIIuuXETR4bHq5nEZV25kgafnw3MFE3H3CkCWK0deRV3b5nOBQwocFss

XgMFnfqIcipQFryiIghKTaiT/g29CLQr4KLV5Xb+lo
SXFgmRhK5haYZNVGdUlqUCL4AZNQDIBDvFNe1KmiQSHcj5cMSaIHwt52nyvC4+iCx0SvBjfGPXNMmPIi

rV6JyQnvQjhAD0Hh9bxppuC2bqlGv26Anbx8piXAznJV472M6rwesGSLItQMIQJ1pt77QVq1aa4YLyvf

HChTuXE/+hcJLSIIAy+jYZD9A+TH32fEKdRLjdhSzR
eDW0odBOhhvozLS+AYTP73nNJkFe1uLHGa/25pVNsyUJIGuiT83CbZrRj0t88vDsx9RQBf5R1z2i9K3+

5jhqYz4+4yPQ03gnbGUKhSo4fFn9fX2j4kyv6g6HwyFdTzky1EkFfhOruZi91hJ5p3cO5PLZ9Yvr7bT3

OjJBebPaW87bt1RUL+7gUXjrr0UsmCslp6lh3RQDv7
P12w+YrP8DC3Ob1n7ot9/A2+ygbIWMfWyImygcKRt6L5XN4aIP63IgzM7uo3u8ey+x1n5tYJVEajKbP9

zT4+4pAS9OWwzaani4O6zLbuewdBnii5wPQnxx5YaVLjgNmhJjTcLaRIRXPD9PPjK4zVfAvbr4iFMVtQ

u4y4B36vBgKEIbbFvn0lMM9METbMgDcbtDdvj9Y43n
dXpmRTeHqvyRcgb+aEAl7EC5lN2ZHkmFp9JGWGYtAiVhO2wtq1coV9EkJvZiTQPCQPkMDnAu3clId6qs

w5b5LPEh253+RawLC2eUwhPb9gGq8Gvb6eKxXFzFYQ8YJiSappJS9Ba/o9WAnZ9iF3ZD6XxP9jqymb3k

WDdIe2cSZZX8PSRRcFanSCAXY8fVtznzpiCWSM7RCu
xlXiwm3I2//7K/z+p//pf/qf/qf/6X/1s9Ld9WNr5Vcmq9MdujKHslrHnRF9wm9GvibTwjrMwIn2uavh

7HrC17RAJ30EykasIZo6gvANBx8Vg/5sFZbjztX9rFGLzzv5+CXevvNVwaq+Me7qG8mTkXwP4TfT0/XP

JeMJwM1pREGRc9hw/PnyzibFSv+VFdXpPWJo3H9EZj
hnEA5FpXazaUDbRpL7ZxB1Ejwd89y2MDd9+VCwmt7w0Og7LT8GTHAS3b7UAfio1/f5spXG7NWNFeROnf

ByzWXM4l5tPrAKCQPN+SThht+MMCAryUqRZXC2loZf3xjNxA+qNw1JymtRgS9CcTYwuzW5CWOkJpLpau

+d0IvcvrV6hBFHm5w56IIR8hv9Ds0UfQSNJBzbstPO
SqrhwmFOFxNeIIMwgyPjBxDwzwdos2idUfhpSlEB4zEE1RadX2eTehSGskLqrpPLFFxl5WTPTKM5Gxr5

4V42aEupjrUlp2xV8Rx/EPmVRpI67UzDxg2rk4IDuTCc4J/8Tc6dz5cGa+zk0Hu0h7dehEJJ794iCFOU

21Gx0EfwHpRd3KnhRjayq1cE3w9ToiAqpadv3GkjeE
yjqb76C9YMu4MCyrZ5V5tiLs5jPvCGdELTPvzabs7m8C4tJUtmSA7G5febiH4CSg5x9vxJ37aokc084c

wPCb8lch9HFPH0Hj/zzNHTd9xDcHh8X4QqfmhgZy/irgwj2Gf1CbiDuncJWDSTvvUCsG44Uaki209ylD

jE/ZQJl+pJO0ZdjJyjko1igpl78dGxboBBNKjVFSDk
UI+v4T6AhZuomHPdW34CAdxs2fffn87S9KTjs6ZR12aJgNvDNZhVcotfCKNXiuMSROT4PjpvOUOOD49z

cxt1R7RA23+0zzThkR3Jdj5q6Kej8ss/bG09Pdfp6U91OR4LEcxp7dXfV0CV2pBKRpzhlN7GAo2SuSQ4

rBh7txswPcjikBNCxDin6yXxG1HZp+HMWRiCV2fk7g
nyzlDRjKtgI4ttcGFn4UKpV6tIbbT/aTLqrD6E6G8Qw/k9gqc0cmQBiizkkDYWmO8DxF/0s1Wi5Fpmf5

3o6/GQccKOk/qypSKINIJXsSRX7WDL3uxOrMemE0rdOOff83H1UgA4mopwVNKKvpKG5NVPIzDXUqQ7f+

yW+N55SuRhJjAuZVPL9ECiJnZiopisKK8GXs5bIYXY
kcLe+cFtFNZMdg982ChufQ4XWkIFG8MxsEiMlrezTFO0hlp44PWymtl+NQ4YmMYnIKgmY9cheZ8fmy5Y

/3K60HwEhFzye3dMBfNvlVuxqjCg7moHP9d69lxJUppYGs7IoC6Nr6l6XPkYhVBTLHfwD9Bq2QoTiGpL

wZfnqvaY8L8G5a0/u4ikDOBZkypr6MdK5yyUn/A+72
4k6nzCiepl/zpCty/OOAR3yjQ73OJdHhhskyggGBgYj6+OZt+31HZk7KR0TMoH8ilioToiboYwl09D7E

ENqStWSV1JwMzCEZbHDeXyvHwZDMGUWky1l+dhjNFkiI244Tmh2v3SggwZrAohCe66db/fZLuKnTfQNY

nfUsRePcc0ujG4QC+jtu1S4+/WVRROIBTbGwSbqKL1
7dlb8lSjRBuzC5NJxw1EdsQhHn+T3CcgmaJzsVRQbK41NZ4yohszxEGqy8l1VrB+umLnAnyhUPXnIUg5

VmxyyuoVhxlKOWdb85XKc/x7wKbzwQdTw8espfvl8UIRVtVm+/sTSc8MLRsmwrgm/wbzs33svpkTXIxh

47MarZkP9OfKDtT36V8LpzgpZWlmC0yVdurdKPcoUE
qMW2RRr+zwQEGxgFjnfR6k2YMuln3xJo68S2GLHbGvYLWJSyjNQuKwHMEkoUFaqLh/lhfNJbd+sntT0+

BErLm5HDvIzGTeCqlcZPnc3D+6SQj7zbDRXuLxXQD6kYwpgPSY6/bYRueyraiXD1MJ5Xgb98UAObyPQi

ak25Q60MtPEgBBium/hnvECjVRzJAyT2GdYH6a/BQW
v45ZgWTkXGqRAnD8CzM0tyKzhuPxIQQWn9NNvqDnHS8f+9EPgHO/9fXNL7NKiTVvkRRZdBPvNCQgY+8D

oIaFrJsGOl+YaGHbtpXRELh0k918KKhUnUFaPsPX4CotMMrOp93fTmutQ2w/Q79m1f1XZa5ELVIGgTU5

4izyCR0F8eRpkQa1QqCyvDsHzDJAiXRE9BotObHf/g
o9UCxNEihm3j5KZKR/ZhmYib4eRGa/d8LWPOCxNqX1zFPvYjgggN59sQ/0Lp86fdt7nWgrPRzeHKOJiy

BOFT/Addie/7wv8EbVQ5Z9FF1n8aIu/uNtdghOl5oBf0SbKswoyLXcWmuGtLmTD1SSHbSa3OsGuP5rfiUV

w6bTmB76YtpUU13NrDyvAzg8O8ewPbF1c0ubyGghFm
y5GGEoF5OfKHfZ3uMJCi282i3T6iZOnLUsEMLxgtnGg3DR8LIxa8YfJ/mtzlMM6lC64YpFL6wlccqbvb

xW1rLlAPP+XJjEH+0AHINMUiRomLRDUkG3IfgjBuiJ5fY7ANgmqnKTVuojjZUA7qwbnNjWSyJ9ewLMQf

fPek8ppYo0tFrTKoG1YsKalKsLd8FxzME9SQEsfHec
bdaa6Q9g5atsdz01sZo7TGGGgJkE0W8rbuJge9DMUzWuWMrET8Dg2FU1LY8LFpJFkv9+NfwGHSWy18aQ

djHgDvP7F+dV9nCFigp79gL+I32+MuXmajPNOXtMXto8T9L0cYbDBRBMPjY+sCxLmi1HjpwMv0KIovul

e9rZEqz0ZazrFJeMyJDE7QnxwQ6teMEMe8fO7vC7on
djgOy55XW1Ju2H8sXfkb8s5/GumzGDbgugcbebBlv5cu/fC40n0XcRb2gzCldB1pu7KYEotR+pFsee6z

xoTIBUvxdjahULozpt0PEL7VIB1nU5c5ejNKFVvv/lih216t5/1kySXKwXqC16sVZ7/J2Fk/88RMw2OK

lU5NLY310UoMdexC1Bv/ZHhADO2AUNpS8j1++2N8eh
sJTfHwUlx9zWzUh0rC404vlVcvulPvbcTPumQsi8ulG6JzTqo67rCY72H7rtpFtWF8bUMSMMkOVn9tjD

pEqdrB9gQQ8kfSuOlmbg+B1xMPALMC/W4xNKLOlwjFFeRLHhu/LQnsf55KOaQomsCHWQTzIgiPUEEaIJ

4kQ84ZTE0DHMorRW8B9nRnEkr/QZwB3QyojoloKpiA
hxM7tlwTbZcbWDYS0FmTgETwJF0p5vwuusmiJqNQWvmOkDTeI2RteR2XArBScM9onR3s8Ci3iM6uBIm4

P+/JnStZbQZ93HixlwqhjmFXa0WKI5xK2I65hdlUXVgF9yxkmWk1i55619LBeIppeQuRDJp+HBiMgyK/

/rDry1yKT44vyPPdOg4AMfhOExESccplAsvRinceRD
XttxBZ12OCz7cLSFlTUPfEKy6iv2FwWNe1MKg7wSjO6e7UGof3vSpbQYDvY4qtd1cyu3c9+pzSftEsmA

AiBbWtXLm+lBALlTDNbwFwB39rf2TVO2ddbX/R5Uh0dN2TwqVJwka2LRetw4BM9RZSTawl6Wu7zWAY3c

XlMmUWgpmlSqmxosBFUMtzuQhZXQndv+thEjeGzS1p
XJn04WcGZGQcpwLU/rjpnuI+IZMiUTeXinAQ6doS6W6+pGSx98ocEzZhjcCpFPS/ANgahbUQZvtE/jET

/UvfCq5p9zJIox74BBD8Tc95p9EaMdPy6Wz27H92PRxsp2vcM4YO16kBZnQUU7vj4tMSm8pChRiWx3wd

wZtN96cN+H5zEj1jSzlpPkEtZaAtoV/j2aqPRJ9oP7
xojqAlRGBQiktET8U7ujZrnsa6czup89zKLsSuMH8SvZDJ+lPTbHQXgT1dG8qOf/QtsdJvy5kdzzjUKy

eV4JmjY70LtWKiDQveT/STHE608+3Xwt4/Q7XeYExdHRD8XcJQ7RcOekSUZw7+GBQRWsT3XuJelyaZFb

tThFlRteJHXiEw3L4pDxaj6kWkf2yiSK852oXBphE8
heE8Op/v/6/hhuz7an7ml9w5i70MR/14ZzRBQGU8H2HHTs2u3f1+83aAgZknezxqfW/qpSXI8N2z44AE

2PHtIEESHq9T/jG1Mutkhd+MbT1KQwDtV3pq1A69OVkxC1wmVT/jhqHrNyATyZEES/GDPZfX9iV1xv+t

NG9qP5TESa3NFCGHYaHm6WKblsBcznUbOlnNBHhh++
9/8/4wNDpT96wKBqIyAFB9la/PPV42XbU4E+dJG5J/+mVweCtOlQBqqYraiDxfguUQUcr+EWUQ1jSzMw

bQG2q5iJHbEGX3Vv+bUpSRj1eSPHlVa/19CPSImYrro8oBa0EuhifFBfwGY11U1wQCW+vGd++KItv9N8

TxXoE1ZFNjH3vX2Ij0ydtw9517/F7JCoWB5k2exCOU
9GJjEUdaFgsMlY82rm9S5sLojEsHnZAUg/CBwR6qpBiqZ8vpMJF5q83VjZs0UbuPH/whRFy4x804mnf0

y7WJ105Z3dK1gxqkBjgSamKLf7tB5RfDxga3jSYpJ4gB99wMV3pKyuZa211h11uRPsEdu0FKwtKUQ1Hz

1hbKljST/CKACXKcxDr22YYDSnS7J95jHnWGfaFVc5
B4COIkK7js2jhYM2CMAWhkvDd668kMHf5VGKiDleojUOhP36uFXjS4KtHQqCv55Sy8bPM3726DMS302+

Wk1rGWHaPCi6n3w+wRgy4eKT7iZtu7Te3+ZKTI0LsaFqcttjQRZZAZp2bKOs9Y858VUQOc/WMtkbWI0c

9pOZ+Ufp14WM/zqx8F7Fi62efvo6HAg5eAZ55ym43W
WPqJ1B1PPOksLjv8vq72rM8ROlK/OnmVpEyfpsXsUA45UOyY9tP69JNPXnr7rEBScU8uF7etXeF3LOPM

48IGCLVVGvTtBbLLAOv4uyE1ZqD8UwtpRq/G8Cg+Umy5zkoVqqOWUV1QgnuHuy14Da5c7BUAZedxMlyU

rRGTPp5CWqsYnYU0cpwG6sfe+Sijqvfb2y13CuJKn6
HCZmF2Pu+PZ8hfh2vFbUCcUmALUUptzQSG0xOBKK8UM5Z48F+A5uC4JyeXA1dYqbWRx+2m3/dNsPz/zH

9nvAzsCfxqM/hln3GIBmVuJ9Ts8Ng0sRNrB+0BpgP7+8m/Foo0fnj8Vsl5YxhNo1QyJ5xJ5kTANe6fOZ

6ebKHF896tv4naQTL5nY81ZjaplMomX95TtYkW6ab3
i1VO1gbe3HiyeKWk8ifG8WnaO3M19NMc2E7/eeRtM3TjzsyaIICUKea1e+ZsnwKuWepPM9nrSPuLSRUM

+QjuRe9aH6CuFAoQ5jz+mf+aYySUAV4x1S6MOnTOvbeGxvUXkChDl/ikV1IQJa//SKMz6BW0cqR+VEJm

vb1JK7FRnatcNhtHSR/35Ap7M1CmEJtGaPwV17EiZs
KN+sGyhnUXXhbiZxGX1hLo5gO+0IA2atx+92coUmW69AKTK3yLdHcbqhHyuH9/yoxXxfK4Io4UvUoHFU

pSfF74l0BvXygVkAQUoNzsozD+nRDPgo0JTG87X0i89DitB4CXVgQtINoYjxWe31C81WHJkl4mPIqGpu

zviPv8PRpnh6JlkDbeuVyTcIcKhPcLXpSvqiLgPJPe
4DmrVgl0nxZNz83Nae9vlk65BxOf8hi46JClWiXxF50xlgszJuriQak4miO2TuU9FUr2KjgVb5C0l4OQ

iy8GM6+n2SSv04gjo3aiBtWUfzWX0CL9SrO0tVn8HUiJqKWDTAVVa/3VvEl1J4JS0So5I/xRWU38VCo5

xBuWpAGUqp13pJ2wO95GYQXxnrdY9Y+m7Lu+b9uSI1
q9ZMhbksdaoND/0SpBiBVdFcV37oFqHyZ/vEtZ2Z8ay949Vi/XLvEtcQ1YLdi8hKbulqU8SCAOAZ+6e5

ezpa/LDP5DOJXHLXeAv/piYxz0z7+r65ovLgFWZ1V72B0F1JQfs1dy/njcpbSel9Y/BwDrTZ/lJUHzfw

XpcXJ2BfdKtLKrvnEXIzkrs5LHfcmK8KC+hCDtbLl4
wyBqkIx9ZHoQB10MVxjtTCXTLcAqPYWImj0fuS33KDkdEs5rcM0WJjK3HVPYQkY2LptsZwqExIfs26xW

rs6r83+5cu3EkmngrvBFj+7iEBreIhtxshJdTTcmTZr77nLPELFqfbFY5utfrJBUwlbiNaAmrjF873x3

zBrsF3OlSBHcYR4lg24nLexNNawHAJUMSbNlfzUxWx
J2Nijrp9R0ezq9l+zisk0jK8pyBVlg5X+9x9AigYqFYg/0Xoo4lZtLhD1xt8wRS/U4JhrmGZR6bdoeop

XgIesekj3rInrSrWuKWCMqyERgIHTNFlUPFEnediTkVi8u0wA9gddro+LbY3yTKfUedqoC3sqOoo7yWW

oAYC3KreNeQn6eJJEXt5sH226lu5ClBvUNJx9cBxOp
8w7DJM/xjw8Uz89pQbukkV6RvQunJMadL44gsT5ZaFU1MMJT6vX7HJ0UN8dWcMrooMHhXE9l10glbiFY

dKa29rwXq6abjt3wF4J4dyeOXnZgU8lJqXHyXiDTEYEPivCSl0KYuTrMtfGRPYXCgpHRUy8fim3gi9Yo

MOyEpvZHRgXyWayRIuLcKXA20uWeTpbeGPnFP91qur
gLgmeB9OhgS0f8P58bEJJ+EFjpZFSbyqmg8uGdGSCsX2OEfvkYjrr7M+j1j+r5sKZeog0ml/IBAOdsQ5

noULpnoqEe2b1bwMa1kDXIpFEKpgvzwiIkmK/ZTTmwshLu8B4b0G8gLn2NGB4sAlw5saGsg8efAGVJmk

JRTZBu1nX+zYaqfbqca4tS9SvXFUc1T2i8nX+4NC5v
yy50B4zm3FTY9VZ8wWsG+daVreDg3P/aSmslMkIavc1wtXhG543fTI/uer52kx6pArno4x1xX3lSE1gu

bF162eB/3dGaz5MQZuVbz4e7CZi4iI6WDKGICdR7pB00M/T/ry7wNVyJMz7f2ZbLiTNIC09eJi72RNhh

rRMEjNbHiFei4pG6pDBjX7+Gkagoab9++nEdwvims3
3tr7OuSz3qf/JeG2fgchKqSipCOs++VZITk+NNlx5jNd3gBCuRz111QeFWkC75WgndowRD+rLMyKhYRI

8x2KkQzytYA3VWpeZQ4mYPiZCrGiTMhQEYacr/JDDMw31rbjpaBODp4r1Kqp/r8ToTI4niHaXwuje5PA

avh08rxZpVoNoGnn75IfyiWSB8eb6YJgZY0LwtL2CZ
u0Dqc69UXXmCRd1clbge/+9rJhMPFrzplWobE1GYyhDCDKJxMebEpbxj6dd9L/Pjgbb4NorAK/SQyhFV

cMJYsA39/vZRZ07DxqapzoR+yPkratbG4h15rHRLlv2pkmHxMU5BpugcdiZ+JucV7dM73maz4NxwwgUf

6LWBu0+zKYT4vZIZnePOBDkuyDOp6mm/tK5OWxnPQO
wDTzSUiM3JW3LhNm6Vcq/RcO48Nl+zbXRLvhZaPcVDrI6DUINRFv4e6xN4R5RSTdB//ZoV4pcs1pcj9m

rdgfbQZQY9ricIWdfWPTMajl9yJckl8zPPAZ9+YpYmKjY/2U4i+pl0YLOJmjne3M1ZAJZXENuB+1jT/M

1Zz/yOe+zyum+DkzkBw8lPUx/ZR0VQ7F/ZUBazx1Gi
h0LloXfPGOXmtTje/dd+vOqK9DD1wif5VbfeAhzZ0wqzmdNBqrvHFf7826PMrPpwjnVPTD15BLxY1Ca/

FEij7h64qBBHLEZ1YXTxucXfUH9jYMZprqLGdgUpr+HlVjnrkP5p97kJJMqPTToQkDiEoSyahMyujkVE

XzSuTYk5i1tzhv6W+d0IJLT/12Jg/QqrhFID94/PEA
bgm/cBpMIwmGDKVVrHFSEWrj/FpLO32p3NLFTPNlgbh9kmDENjzGEllEZ5refkWJ+QBT32eKvODsqrJu

os3pwwnAbjWt3j9/uct7FGUQJ16PJbsIsNygt6VlWa/fdOMuJ0HMp89XFGuHDBVwqiuP9SL2K5xS+hwz

PNbOyq0AepOZ1wbj4VRgAIKGGXFhurrXar733sHzN7
7MjFKp9TPmhqruNNrjKEhM0RurN8Li3ZrICq7zqFRUFvF1VDnLaH4C3wB8qLdVo3bqFtJykU86/48ZME

reqefpJ+so5+g5prDo4faYfGOPvCWniVLtYwTdlRUWCA4p7wJkROawd4G56Q4i9fzrqEW+eTD0fMWXAl

G4mjUcc+AjPA24mnr5TmJY0lD8J8/LwQM+/3wn8CqJ
7I2HWJ5MJIsfDg8pmTPu4lSsTaDYDPdT2P+t9wauZxWkLIh/AK1LbCmsO+KxBnHqr14zlboSrf1jc+Qk

v2DjZIZPPDFbO+i0jagfY+LLWbn/7KRWquRtLnDugxlA8nxjHmeK+mTIUrTU0GYkOUV6/DXFwPubD7Tz

L0gH+gL2Gajy45rcnvHKwY6nkJrehPt3Zg5o7BF5N0
knBilNzypbzbbxAvLPHwMmI0Xf8jElSRzWkfhfQGQITeC9PltHKD+7kUQMMCcVv6tZMkBUiOdLpWe3Bl

zBl7nruYMuvVlttlt6HnMLXj/zuFIaTvirWo0TEl6vlE64HlaIe/p/sPsch47N3dSzni4SZfxEPCarmI

j+yg3bTFZv/OuUllDRV7LBxBGT1tr60mjtII+6qG1E
gK2V2/hJd1jtps3r1L25tnQ9ysmkbz9M2/8f4brmA9Ly/FH4i3fO7wr7HfpfdadgjjOuPxm2W/tI2sV/

VzpYsuZZrYoPXkh4uCU9Jzy3/XeCYj16nhJ4iPkyxoiTVzcEroNckwzC8NdyrPRpOQ3n6nvMYK9QqUEh

HPqZmN9xPPdrlMkcaxsPDWIG3yhNMQK1qW3VPR5Og7
ZEaWuTDrTUwesMh4eRW7Mp7iGhAMei5IPPZtim6CBev4R6MTCBcdBWmVJ1dQ/MAzxdys5K4S53eeBvo1

DShexXxH2rBBtO1X0t1hMZF9zqRIMo5AhrjISYN9zD6F8dORqVDj4oqJ60fw2246dkjCVbM9zByZ+T+9

AZgP3rrRoeOQcwN4Ykv6leU3UZGkCnv3dzQwqoll+t
dmis7TmKLrv+6S03cqcabKUdo1RkorloTUYDb0P+9dEthn7cw/alnF5Ju36sPfI6Ong2+j1+uCJ23pkJ

curs+dKSnJpMfj7+R6BSKjT+B4QwaRd9bHvXwhfkgzewPSTwBKXXEbfhtMHTRUs4YDqcuile472ZkIpY

uzhyL9D7bZq8d2f5tNUi9M8gufvBqg8yxnhQ0OgCYL
KSylu2zHWbChQhsNQNvdNwTDvstda08+F0IXulWVoa5Sr63L8HGy6efCwy0uVVeU/x9qDj5lY+BWO44q

7t7uX5Y5hf3rgRzJEK8YQwePsRpJDydi58nqXdXs2cKIyjFWL+eOeozyIhSqG464oxOjTKyuQ2e8K0PQ

DwCvFxyY+f6nVMQi2ZZFeg81QTW5y5OujGAx6ZQaTu
gsLYva3Hw9jmGM7T2TxbS7V0gY7A9x30UmL07+ZSwIr59ivoSPALyRlpNu+HrDOOUuB0nLgTdT8y515W

0SxC6AgSSLpEkykegebWS+EDwSfAY3ReVsm3OJmtV5zMcrEP74+p0wpG+0jai/yJ+byenciUe6GM+pCB

v6ZdSa6t/FaJd5XeJQvLTdbBtC7KNl5HLmZtSz68Us
3vTL3T/J90+lUe4ez6ZZiFYDdBNGlfIFnB9+VFwGhK7S+FfTd7fEjw0WmyEmDRtYLem1Ama5GaevZh8K

8GhWJTwUn/PhbC20WIpruoGT1k2hN2PH/z+6JtZULaA0s2s1Sl580sGdCbFGECDK6GnPOdzJ/5qJ7IwZ

Ufpu/pbEmaWo0LwtwXXKIhLQmt3yYvdq5R/1PGebIW
eP3oM8EYWg+ebWzHGDGT5jWHTEbnMQbDzzcIl1pc9l4COh3k3D2wJAqi/j5bhiSSPXasQO315UwYPDD6

NAnt+B//ioKfE4kvKWGqr+wJwu61ibh/m15cuFAEz77MvP2mwiZGp3iSi87cm08xUwRIT61Zx/cw8ZBX

OFvCowqeG4BjLj+gsHo7cC2XV/+VRTgxOt15/NcynD
G7kDq7yYbGdjdTD1rvBTeaqTu0hCciG9lnhA36udzJnjmwhpnyqOjSKLDtG8vJ6JEY17gz+AdNsje5EW

BOKCMbviM0sH1wSktFGXmW4DurWiRRokw0dQw+RIK4KdURKX8vj07S+ufGEvwTo/9r9z8v+n/+n/JuE8

LP0/kH1j1P7IRN7ow5TdHBSgCESbQC1zjg0pFjMsWF
2nxb14UB7NvLQvAB8FHOntI76iJIyhYw79ZNnuWYEaDqJkIKv4Eu9rJuLtj8EzJ5DwMPk9E6xKItutP4

PjCIljBV3tBLJxCPLkUHH+Pj4+S8OevhOglSDsOHJeSp1Ty604WU46jeZhLKPfAHXWZK9+STRnRO8hpn

4iXjViMJ7gul95PH7BWI6edUlsFvB+OpO8ofXufZ0M
hCe6ZFCpQDZqXDv0DcZaVWYfP33MW7aoFwZiVQ3CEL9ZRP1mj4PbEOJdDUOrCO0otk8jJKSpKE2tqs21

UJ0WRT9giQliJMF7DlYdN37hkIWyL7avGhBiS0WaaEdqOEDeNT6nR7DrEHxcAULiNQ9qikDipZ9VcAm8

MCMoMa1ByFZ6IBHeG81oSPSrAHUCMSUyg0CgWI2Kv1
jraoLdTRWpQK9JXLLoC7SZG8DaP2yctSexEUZjFY7UKOlodAUyBNj3SL7HtLWiHADwB18duP4dXO57EB

g+PnN0cmVhbQ0KeNrsumVQXM33LjoEDxbcIcHd3SVYCO4eQnD3AQa3YEOAAMHdgrtD8ADBCW4zOASYQS

yg4n3yc++pW3U+3HPPl/Overvq+bS6e/eajshqs9c1
l2zjKwPuZSq4JGA5TXa7rJUFJGvE3BD17ShT9NVVtQbC1UQ5JLMYCSSfBbtZD6Ll2MYIeSesqsGgBogS

0DKLSorwSXDxC/I6JrnCXsBzRpV0YW9DNm3nkcf8/6zp4RkGFbH5J/NDEWiS0JCrKVsn5XlSZUgTPUIo

/acB/vnMfbMWqubBsoN1Kod1Z/MrwAskZOQXKMioqC
fws4oWLhnUWZ+Y3Q8kJYsfeiA1cYnXO4fYMBb8NW8wptKOoOB/p9lS2leTOmMbLnUbNrPLTcWPFSHZUH

P7addCGttz98M39qQTWE3BkT4J10of9Yp6hls9+AL9/MNrPiKnK0dXOgq1DA8D/6jASXsCDMLpGk5aet

Fppcapha01j13mwBb55Dye1Rlc/O+SeaAlLDw9h0u7
b//g8Ah+yzF0gV5Y+Ff1Zc2zWCKQ/93+q5zhq1w3HERCFnW/hxfSC99/UhXdcB5kFTTP6SV0xUle9ipC

SCSTAlzqh1Nq33YNm/CGlRyGNHNhrZ7S5V/M/r8RC/v/xez/Colleen/qL8CpCUAopjCLY9rDrj6WDlGbbaq

/sW/+Bf/4l/8i/3zkGl0UN+6uVrh5dKlJlGNFQH04X
1M/txw2ImKPQvjParHDrNAl9MaGFSameBVV9y15/8rgj4cG1T05U3SjbXCVw1+DeNnyJUTnn6Zs2X40i

n+xMES/iZdnp4rXF35HDjmqwzS+KYBIf8YtrrlvleoCfiuSh3ZBrZ8aRrqcvIkIrVYAzj9Zm7s4VF8d4

jE5Ya85w3d6i4SvkMDmouC98RDW60q1GKPBsISPzzw
Sv1k3nTaymnGG8BeqDOVubgrK3J47pucB51cDGTO1JNWs8ypIb8myEDPzj60tqXugr1kgb1SeWQl3WSA

I7DdrdpcoSViT4JwYdSOUeZImUqQEMfeZjmcAqaGWMeGevDJGH0Y9l3qGq08LeEjUysSaUKO7sX8dvCY

UyMG7TXhltsqEpmT8ZTvxNLdt4n2rcrzEE//l8YryI
d/46Wdcs507Sewz003LTNxbPbOA2G0ngDDJrDVNLRZBiTkLMTCZ8GwKj9f9IPCGsApjk9ewkeMQIQj3/

QlPYTmyv9y4OLaDFNVABY0NwKmW1wZ/IgTW2CLIGxNsZewwyjfIfky4NVa4YrQO+gpyLZ3eaHB+zQ3iT

3BIZ9jM/Z6RMB9qZTJE35OttL9bvkAjWq81ymsQMgL
IWIBPRSkJ/42m0JBCBMM9r8SHa1Wleh4PwLX/ouEnMigMhMFYkJTmRQOTgFHdi+4F6y5IBVs/zAnJjIQ

vwce6Rnh506E3Go4ZdxxkbPmIpxzOfaDjiJexZQeMy+BSZXaGlTW2jCCnGPkOeT7n9K9OeWk4J3puw5W

RR4+XyBvly7RX/8moiEQcdbdC2UcCshdupubAQMZBK
Y4mf2zn1WCtqcpR5w7WHdbK5yLhAoGLp8Cd2YGdybG4x0G6am2fshFUd07LqlndbaZm0H5NmA8g2C/BU

8pbk9YHXvCpxQevMWtddaJ5gYV6H1yIlXj9p3k5E6/p7EHjPqoq0jmXeo30ddgKRzCT/D5FaTgC//k

0jJUFE5NuATVmw3yUDso1zX7OUtIU/2xvfD9+LGFJC
JNEQR1PAy+iUoxFJoUSAW/LJvSUJ82+QqiUkEdvEsKfM0+XGYyrIZCyKUdyfmyFlyGtF+Lrs+u6aglE8

38atPUEK46u45Zpd+2QjxoBehQ/6yihqH7IpSX+0/p5PCMKSG/ZxL2h/zNGpp93dvgWcU/NR8UnkeWS0

c5+qApYejHN6U/G3vziT3kQvDDwSiRdNTdxVfnh+fH
Ect6SBRtAF3rD8/K5r7UnkWZdVltoVeq7zQVM46Zb6+Wd/mStabFZdDb0xeOM+6Omg6VQsBs7SEWcmMa

SsqFRqW0mwkqGr0GwGdkZATy0/hoh1/Sv9smfylLlO0Py1xwMvbYBCjnRgsJ3FhMgcjuKw+ufLkIBxVV

EXAmbRZ+fDwRXpwp2W4o6guIGI7kJE9skDzrFO/KuC
RBsJYUo58N+iOY9b4AQVQ/sVpdOW3D4QlEWf3IAuteoDYXfeaZm4g+iz/0sL0dzlTaNmZ0b2C4kez3+t

GeJB/uXbG8CqBmvRvfoGU+NdroRNeFO3MFZesHonQy4yN04rZ07ObHh5E5278n6ehWGerL52iHCDDg+J

7th0Ubre9rYWnYfly1tby1IiCXPotKWY+EsSjwKVhh
OhlA+fItPqMW7OzktI51BTF0RWEW1GTF7ujgzrY8xwgjjVO4hzo+CkUd4szni9bAAqHEyJV7JtWsZ/+9

QW/pIkLTcZGWq3Pm3e5lhJCOyQ+14xWD/HyosYn/nE5R67reBGbgT2bo6gL5ocMcDwdz57abkeuSGJyh

R+K8SgBF0lEapPFVC+OX9wf9hqyIY21GQzwVD55tg2
gGzn6cz9okKp0upSLLa/T/n7bzqjm+Rb27HifFQblV4Ydko9qOO1XyJpy85PYl9O8IUb6YdJpija5rIY

Q8BD7YDCsjbjXXC66VPXX9NZ4KkfDfPQHMRMp3JyK8IEWmqe35xvz9DiBUT2dHIL9+G9T/BEpWQfzADc

LMl/ruuKGMLHUBP1TOG7uHwm9ot2NdpPYFpaGQXAp9
pYHnpzn4McUYnc7t/JLh/lhWInWi1hLkwVgLYZTBopUsdjhJR+WBJs3UDSZvs36hsRIf6O/5HXmWn7Ag

rBoQYcjqzhk3v6jPv0IeKznTSLSUz69VLGHDPonG5iKW7zaju/VOHRUM+PM0b+yGYeYK98diG55jRUuI

x1AJP9d4GhZ4DiQ3Cr+X5sqBfeENzQe72e6tEDDhCO
9UTZdBrduH1pOQc0XZljpPUakgPl1/OB6atJSOwQvWWiEoilxZNTuRm5RarNgPAqaFkfOGwBxbazWage

vToRtDXAHM0HJRgt8tmLB873QE7DOKrOFfVo0yETEwQMV6Icazt4RB8w8WdgjuyYVuzNsslyrjR4LF5/

AUjsGoBx/ZnguMDb0TpyBpy63LUD8ZJ9y2byjlxyQM
4tq9MKWc7kfpTrn7m8opHZf3rYy1Fo23uLTONJPDwRvO2Uie/G1yHP7GYWEupO2xbHS8dlniwBPbi6KT

QJABqgVma+i1/dIWECJyLExbc7UWkoi2960pFlgtQFLGZERjTYE54RAmCql1fngp1wOvp3kiydqqa4/c

R8l22Hp0my5PdEchtmAAfDEAc9gUmSj+olBD5J82sK
vvJQe45+0lGmTMjRv8zBz611I8nbVAWQbEFMkfeCLSSpQAaL2HcIxNVA22TftOsUn6oF3lwCyEmcwwTd

+OLmJ+4VwuQPw1o/cuUU6aHt7wdS+Ihm0YjEjQqMqblBhI62prwo5fGXLeDCr/Bth5mnADUTgEOnsxa/

Cdg9nV79Sc64hfK5VvSfa3O0hOZ+xh3R8PJUqO+/hI
LoRaVjjwp3SzOzr1l3EZ1NwSNI/Rr/7ZU55DmhjiFHETdCLdKhDTbYHeZUdDwqVqVBt4jqWDbfvJbF+o

GCLpGxOc1HU0b8v6P9AUnKHs7/UMMZxNkmR1IuWwd9/cP7k8SDSk0Psiby5IzW+mhIs/pzMZumbdvBUE

BVaZJZtUfRvwxbYJoogmL3OgYqcvTPy93BrSMZttuS
Dgp7yJG8Fcd7v4KrrV0g+jpAH9pmdkhMH11BgGyKvJXua1YgSTCXiLyw2tIy0oqjCCCHnM6qOwUw5M4g

Vlz94S+Vav/9R+RcZzpguKZSw3b/5PGnkXPb8L2EOTUuAL3HvTjEShl5fv2xmL25z9PP6g7v1hdoKoVG

adSAv6Q4QL7AcvXP3C3Ybte8vlY5EpuFIlodxneLFD
OBSg7bg5QEgDj9I63bPj4mi1wTWBBlkCY1Wlzp6YcprB+VWbrBJI5UzO7Yd9GUKG6Jlj+vzcTe1Hhhga

nhS8jJ1SCbdSNYgRLN/JNK7/WxOPltUcOW3GtXXGOIPfpTmErad45+YWTOxOzOiy/8oXOZZujz+4QQl5

KXSrUaiKSwK6IBSt1xvTTD3u1wxKG9j1JbhivCS3IJ
3Ah8Z9/NKLf90gYCrpdwLKUEd1AwH1/BAwMN3HDLpCCFFsdCKxcTgFsQhoNEwO6CyQydd00VpjWnaU8f

pYhe5bartdO9JxKGIXoEAVtQF34C/4w0847uwOyRT8+HwB0RESfq7hGRk47X13DWwdwxGdVop/gNFoF8

cNiaqaGwr5hpN7TuuYVe07WvsHFshyC2XoChj30oPC
ncAZcLkmGvUR9SjzfD+ITEwlUggwEfs6hUbm+c6F9ArUD5aRrzHIrgOSbJWBZP8uK3CFfbkM7AtaQ69s

DjbkcReeT7goJAHqYnm3KUrpW4XmTMHfaeURzTpXtdfyidsoJ0IRM1HxQ0ZJI9rRON9dscz6P1BSGibP

SB3VKE0tLUSMB+weFTz9dPGUZQfIQjL7YQLxO1PRLL
pgBplmTZzTl/Jt/gtiPS5Q7p7SJvvcffuRK4hBhfx/xs0+hyNdunW1H/4dA6vpEjKr2nhmr+Dgtlxi7i

1RjULsk57nnCgFtkUxOS2fYMAXezfA6Md6B6mD6KfJN7TtcEX0vPxs5HTlCrs/xEyPp5+1EpQrt3Ikwr

UCDyMS36piAl8xBMf4tIXQ5hTG7Kw8Lb+UqmpL3uHw
8L9js3hHjO5yNqZI0tQ5v0BH0hX/EveYnLvUOpxaLj4GBI1jR7AyyfKq8D1hSZbLDHgCDO3Enl+eU7ZZ

cjsTUxnBQaNZNVdhrpeWcMcSFtsxG4m/R5s9apuF+F8lREilutNiVUfD8StHsZuCp2Bu7uBZFAr3/of9

CcSOL95QP31+qCLh1uYAbcNO3/dJ0roZK9yosv4sQQ
9x7Ua5FsAJpFAZ93nOqaMFIdZWQ44SmlmQwRJKSvYr9svS+ubdV49MCuMsiwr7u8RnFx2fQbULSH0O/c

Geu5uby7WkD2Hu3AOPHNZA7nzQGdmmJWWy2360LjcCdmFxDh1Dv8k7HFIjkic8hE4JsZtqEy8Hu5TuD0

HEpCIHXMmIgNDuLhC54lJHIxQpoDnaDfR0IrpJR/JS
GbexM7EKOEFAuuvXl5+1vtK/Wct9k3yfDBW2vAVe9YLNXZxFdb6L+wugAe8T/Vi4MF7LnpMAkyoGKAxb

Z6w5OgLuTjwtEL8XLIMhQzGjZ8SiPNQq+QSsqu509CXqkBsWmKbrWMu9YQMSkVZpb6hmANf8k1Q73wAI

iAd9kzJvjfKc1yttjbTaKU/DyN7kW11UFOYpe8g+GR
ZnRcJ4ylQs6tTSp7uByjbIfwK1Ho1tlQoDFyt8HVhnxiAgtwfJpIFTX8rP407kMKLb/E5yOX+ghb5rAE

h4ZUG0a5k89tKOk0rGX7OIDDqrVHIfMH52LR/QAmP9A4TD6JbTysHNGJ7ILW8xcpdcoa+JjAw2Ez6D53

TprxOIFEmqmh1eat3k3f1TpbLAnbolC2Wniv5LeZ3R
R9tPTklkP14DI29RO1TdM9zLXvqRMlanh8Mrd9JgnqGAXOhsT1OnvYTrGTqThzsYcUdH+3jFNn5RmY8q

ZTDSrjakXTOXDBimwWDXb9SoJanxE3tBvMHtaPxb5APCP35y5TGjjJdBCN9XL7Vvnu2ICaj+23XF+OWk

Ii0sMsWzitxo+P0wfPS5J093XePtM2NV4pvTf7rkKv
9aWgJ75wgYms8kaTkG1uheEH2kRn/Rpx4urJnqVmFXaVZk91WLgtJ5EWhwPnXjnJe1vwOfFCwN8/J83F

g/uIcz1QifsEJq1PYuvKPpvNYKSadAt+oNmHReiZTgJOvk3MfgOQaPXkwzc5Be+HuHlg7eaVopS0zF+0

ITWPFuuyrp5839h56kPlqjbGvDD06B/KyUdzqP+5u1
s604mFSqYiUwTm9I+8rtP8MH55JIu6zkicvd4Ws3pV1wIT85vK7O5h2KZ0a+UV7wk1cXnk7V9I0uq0rR

bpr3wvFGwnEVJ+jRaCerlSDedNYR1fZyzKprkmWM55KffqgfjHCr1OKSv9AHsA3HYTxwOyhDQwqbUV25

OgWEuspkXQ5VlCC6Fz+xP/TeGeG3wnMaqDm2E1v3Vq
FZPo4jEOBOXrsKgTGuU6LQ5LnQb/4WaI/+Uqhd5QNBE7QN15bQrQLqTKiknbanNgQIsmOCUn2xkHiXOR

d1IXFsY/B0OX5y8aqfc+AxBahfhmedgW25cP859D+SfO7opJtPMbWGc9R4F8Q6PdcDsw2TAE80R8nYHR

CXFDN7GSEK0gGL96pbNfrSMLGT9irv7DOM1uRzR5wN
3vMZfZ27aUaP+1AEMwQuERXKmbwv5RbSjILzK0yRGVmPUbHd2wS467yxh0hXyhfmpnaEfSvZBUClo7K6

PlisrR9fldkWkEHYMrf0+4ykp3mOMLXeUmLKfuHtsMoCxypjQJ8BVkmMXCb2D6ieB7DNmej2Dv9F/mmx

mgWRIQycgzryeDL9WoMWxqMJzstqRuj4ltekvMSMco
Rrjx1MV3Q+I4hr1oe/qWar4MPBPjqL3akCGVujp9ybJI8sxcAdt0+XeNWvw1AKSKaPRgC9JDbcVjtpXu

1WG7atnXxqOENkzghr8Pa1Fr5TYLLEOaKjEiUGmit649zoJymFeAgkSM0uj2/bLyp3P0TnevloYO4XBx

nSvCGSp0SC+2pO76LJz4E5UKehh7oNY9CYmyIqtUmF
ymrgqxcarhvEINZlO+aRWl3pmSoNc8+3GTKcOlI/hRHyvwMgGnNUUnI5zoUPXvWn/xrIFNuGMfWezNCe

faUQ8OJh4A97VlgTBXMU4d4cdtr+uFmuXSNq2VbopJ2vhVkm2Qq0fOenWUZe9nyRjRDOq8Xp2OAVqLLX

C9276Q6cQJyN1N03X1ndo6/NMRvPZiM7PKfkec6Cvl
JVlLv5vj1lN7s1qCRJGEL5jQR+UgXICQPeT2wqbjQvcysruLwA635IU18Veov8FbqibelXEKW+gV91rG

v6mjputy+Suy873+Wuha/0l7KkWWBl5+E0WzSEBJRf7VxKYgdmF8OEZEmPgf4Rxjw86gVj6Avb/PIrSJ

h5phc3eVPUwbgmRkbYnGXkoyXbW76pYCcVfzG6IQof
PdHPk1pfVtVVNHbcEyxj1A4LiGu7szPvu1x1zO6C2iBImbBTC5ZWjJ4ykxQDWDFyXrvB6C5HxhpilJty

vN9yJ0ooPms+KXnqowSOLRZ4TP7JQLSnvSmTxFYV6e+l++mxiLfopopJJ2l8g1Q5v4InMbOiZd8CPBK3

rwP/mhHUdxJrhPj4Rfr4DcejLHLCQaZ+qSrlCfktKj
T0WUfrRv684+FwljyIIz6P1ytIVHgq4jMgwj2CPGrjHKr/TlIX6m7JfmqwfHGajPPmMiGbRby8vGSOCL

QrnRsO5FH6P9OwTit6olrT3CdfGxh7fkgqU0n79as2stCNpfpccru9wDq2rhl2bsiIHz+57RSjXSMkpd

yt12q5bol39QddjfkEW1BxnB5wLyqZ/GurT/TpWAZ3
uhckb7OUQeCevHOCFCYMCdOVrN2pKcWED2l1etOB9bgl4LyOwHbQuuURuPYplrurZghKI8boXNC8TWCZ

nBqeFkbr6ghGIeAUYzIQrab4+W4Wal4FE+1ujnim8zpuI4P4jHecuc82M13TRWhFJZbB9058k+j5RPfV

dPIdenZdlhRWfDRTfnPVTB+AcAxK2bcNJ9avCT6Q95
9tTtgkqPyxM9BZZLwhZmMLmeQHytDRfDnUbxIyhDVnAFLiVe5wqdiI5jybZelyjbx73sCiO7oF0eJiEO

i4m5XNI8CRG+Q3f0sOLrZPTiy/x5DzLTEV7HXCtxK8+aiCZYSYt92t6CGeLyZ2bD9lC2mzLbLNwSUO7Q

THm/4SB+M3CZdbdIKchlWiEq8N/oVo5lUIf4I52b4T
lu70gBtRxQ3jnGijr2MH9ivPjXpG8/xNTaSy/d76c1o4UbtEHOaEtAewUci7VxEUY1YZAmavSooSGzYu

yaON/i1PWr8B55d4qiV9MRs4h/qlIzheTv06BlQlwZ/VgQFmekufLX1s4u3J92cFGwS8dQIyvHlTxXsY

E0TDWT44hD1mKTao6FWUNCr0M3FcMMPCXl8V855PPL
hy6DlRqrAbAfwG6lWj84mhOI9os+++xHZzqrdel5E885S+YdFUWR92thMM/P9OyOgW8jdVQFemWGUtWs

lpZaplSUAtmMzuWpW/zpkImDKaOtHiUeFaO7EgXWfpZxHiZhZEScPlK59Wz4m89kfB0cpDEa03LhUhaD

8wvepoZ88g20OPeHrxjIdCCN88NvuKgxsZE/G6FTL9
pFEmBkCxonTLprEfo2ihYQPrfTGipCHwfAnJiUZgQacok9d6AyG08EDmVAjum+nYiSSWHxNo2tSkNc/L

jFtf2c6aZdl/Tx5SKa2/ZtV+gHiFYWWaxZHGbVD0QCM1CvVKtcLLNzPZctjmaVN3A87BhYcxF0n2lP8u

9zswxqtb1mjPiAPwj2d36xNNSR3Z5wk5FMgc7bTkQG
6SStcIt+f+nzR8biS/a+e9VdE+XCxqWICXR40bJoBofpbD5UaNwLd2PVrX3y3qiB5rlGSAgXnNQ++JJI

3b/cnO/bhhvP2HVUde4o5KifqRNqv7RXMqOAkBuW1r0nDUtfnvFIcVQBSNhqVr9cC+AjRMWhZ2fvL6ro

VOkPpkRvQn4Vi2AvflLCQFnDcNNWlSSj5b1qXhU4d4
bcojETk43p421X6T3dxE3QJsIh2OfhV83eLVWZ60noD0gXAZIVkMEtIqTpLDTfaXKGEX+Lhjz2wNXVgf

RyzyMAQRIZhi9zK/YC3/x2dAD80KQTTriuZ13qildDapphpn+msmyxSOiW+TZZRm+wEumCnKEqKr8T+4

gtff35WeWvH6NeG7qkkJ3LsTj/hsiAn35PFAKcw9zh
vbjpAi6TfIvCLRCPN+Iun1TxK5aLMfIlxPDophDYJmoo7dHOhWEtzOnzNqxH+X5mFXpGXpOIOHCNOqRv

p6f0T7quwJw50WXZlpP66PK8vvd+rLKFvT+bZ/NTFAGVeDAEw3yrlXgrsDeUbsIRM4o7Rb4VJy/q4/0U

9Q1K5KXai79dWiltUjIXYnLhLbkJyb5br/Vr0/9bpJ
DJIKdTaAgaIPvY2qTFUq4f5gnmi/cB8I2kf0AVZBNkrVlotGFll/3NbhElcy+W1X+z+OOXi/kRZRtROF

ZreDWjiH8zIrnKS8nUb7EksdGJJsPz1YYvHGEy2A8Nigl/nuNu7kSGayicFPi4bV5/j+8VrviVO1t6BR

yZmyf+3rlBqUuQYEXB0aXxY2Uw0YwIDUf9IpXmfWbI
ZPa1wP+bS4nRLU3x8b8/RWYvGepKbGgL7rpBX381DnIHdMo321MZaHKB5tFxCpNCaSHP6DMwXT/OhS5l

zcSKNUPqnzrnaj27fJlHvQZ4978jN40ReJwyu7wJG+qpa2Do/Saz9Cs/1XfpjWXRlLpGkKxGjiO4Mv0D

OXDBfrJyO930bSAem0ecy15SQQOx2n/QuWRj0XAze7
jxAEVaFgVHAGPj/BsrymxYX/zfPlZ1lm7QBuIQyNdFG/Jj+1mQvEbO27U0BPZPuLqq5/VPyb/HWkCfR2

gM1HdKGS2dzcBqjzS++5LUw9ou0mc8E57VOoBgGndIzNK1RzYEfFLdxFIOf8ws0CTsnxJ5M8lDEOns9e

xEa+DbFX6GgGVnuK2GryAZXayGxR7WWK21BPu7ArbD
O2extC35gMbvO3/DqvJLF7ePYxY2mr6h1vQNpYEcTO+gYKSxq8Kr3GJqEyduIkj9F+5tNhjnSfDA3Dd7

BNI3LOJ9Dj2K+5U97AGGsbI9oyJFwx3hqOujk24q3Ews56cUH4fG+P33nYqiLvJbhdl2N8apzldYihWh

cloqLJqsiwYNWgeqtcpSgI5ckyAWOz5J/dtuLZJg5b
EV3lC4v7Imif2Zpy9zqdnF9e7dTjPIZIt/+8TTIt3zVCZXHRGVHni0Fbjub79uf37QHi2RJ4H9j+5Wvl

lxhhzqaq2o+PDUyYc5P8CSt2Xoxq+npSnlG2NogiqZfhvU4Kbh+nhlcCSWJBO9JK7Up2A1VyaU+kpfoD

2z4ttvPRVRPn65rfZ/MElWjj/VCjll9Zjb5RaOLC4B
jwsMvn0XXo1TBXDqa3C6vA8Ht3nnWMjoCi2hcQFXn/BfC31jNcBvv/ifZuAWWLEkYyrKohdcFjjUtKBV

/Rm//M+5TQvpXx73ljyPR6+BnwB7oClE2R47T6kk1fuxBFhLHOxDKQvJJta5HnhqmMBV0sOniTFF/tRQ

OptzyWfFEtnd6WYMtNGWwu9RWMl+NNo3CvfNx6259V
4W4IPTlQCDUvKJmHlM6j9M9HeLRpKjko9wPk4V6hRKLC1p5BeigIueTy4RJyOm8VPXK5BffUg9trVHvL

AoEmf7eYA7Dx3oVHS58duQ3qBjStAIzbq6UOUtkbHXfKUQ/CvOZP6Bxawfol2Vp0RCx0/0x1xBnlRHoQ

s9V3sGXs8gv98AVQkqOYaeICXj/aMLbhYxsA35V5zX
47JhV2Y5CVCA87nqs0S9hKUzWSqNsJLklug9w73GWFqdItTbdGaWzMnQWjruNQtdq3RxgkZD0c7RPPr6

L4GcgjSqXZXDlHyTvZwabK0F9TufbteXmMUgV2ig2xvSkCv2A6qUvEPGrXGlQ5qb8rxaIdvdYBOqrMCX

nDpNXG0y/pztTvEeZmszHcbSxv1Ld+7zK5EH1u4ITT
Tfx3USmrazbf0GyQ9kwMWwW3O7SSza5gnoqipG9pDoy+bCFu5tstXHa4IKSZ5xU8BUKnYsXSWv2tZJaV

EpIbKMY8rMn+B5T6qGn/XIllxwCHRn21oKjCSmQROCuFRX95i1Us6q84ms/HlhsF/RNp4KSG3N1Iln2i

ecN1jpm3u+cir9Yl33rbfB3Z2/wPECtxcip7uYHKNg
uk3maVhb6EpKCQHwfbfuoyfr8L2Izu5ussswZs0ac02RF2lxowrwWcB/zF7HbziGMSN6XxOx7QraXBuZ

9c7rfpB0EUTrud9lWc23MPRmtmMESugzENyAcZeKmI9wXgz9mkgSC2OU1ordFvQymu8MhyRiT0NzAFKB

m/TbmUHGpKLcqV5HATTErN7nZEFtOD8DhADch6dh0x
/8gyi8Y7otRPuhxZenQvkAmyQ5sLiKWg5fOqFityvSxe9iZ+qXKj1Hl3l+iJdA1hFLy0WHDu0v7zWG0y

FsIqRogIvIal8I+cEAbcGxnXiM0IIJQgyjR3zBaQV6czPgPoGjMqHCyl+ec884a70Im0GjK99GdrsnAK

BwlzPt5xGzVd1e9d2EYtU+LwQvRs99WT6YWDenf4PT
QYQXAaMNxN6+BtCxYM68LSGgWVfkyGGKjp23BjhsZkTj7fxsnBuRWVPYaqbiFeNSOtVwirPazTRS1P9S

shZO5ofATaRvewLslWxaUM3vrCtEJdG0KMBrRmd/z9hA3m6ucBiLBtbXFIr9O185cgWUmqlyzYoxYic/

lLJc7ylEw7YUGLaoyM/ThSm/Ki7owpBbkDshMgKfDf
gS5E2TmEZZyH8fib96RrftyfdUmCcFbA5ss8dSGEywEoK6jsIOp3JHlT55jk/HsNl3/f7dA4q35O01T/

DBJckcrPpkbk44xEJnMZllzOMpwM2oOps7kq1PEi7ocZk0gMlEtgYd/ioak7lF9dFpymRYwOe8xBXmQR

CPOLfeSjOiF8ulL6HxTdsfnfIVSek0DbrgkoA7r3z5
h2iUPy0tle2MmZCZmX3WwecC3BQhSB12rlJ7xjmxO8KpT+32KUM6jgYz/J58pxAzDKYntRIgkAia1yf3

eKE49oeeo/5H4qDS2m/VOHtPlTiH0CeWh4HqcbvrKvpHf9o7x4BI9uSy2pvpbqCfKhKAKtJxhRzOMl0w

XkHC5dQGjhLpw9HM8e9eWUQO0PlR7Mmcp8dKYAR728
QXVnX/UXxMc6kqxYzomOUbsKlc5Ba3clHaq7XHeIuSvuHikcp04pw2rzxHbZCRQtXzrN2ik6e7pE5Pn+

miLltyvc6BKtLZ2amqjcEypgXLT7dNpThc3GdiCjG6o1UrTjeZ7/x2FnEBQuK3vSHK3dJZWJSUcT25Ch

ZDM2/mfuoXi6J17pn4e1MANlvCC1wHKFWi0DTio32g
LGr13ApQyysgtbaIi0vPFooaAZeOmZDm/xl7j7ui/joEGHR4LS8sPC6U+1t0DWiPVlEnfUOmrH4b7m2W

U7WoS3iilTPbcuHgS3Rak3fAZWIvyLrQZ6Gs3pLRQ8EqPA8f/PLPN1FYsO5drm27+Nt2yoDs/NjnFE5o

YD6DG3bihihgWFd7RLKju7uSIVIKFgxXLGG/pA0to9
kgR19GhXJIf4u2rh7SNMkz2Acn7Zt4AFCPv9nF2/unwan0JjCj5i6fi2Ky+xqfwhQ/omK3e7pABbWfWo

IdAXYs76VDkukBeH4cnhwwbm6VzesqBqVsWQ1uoUmF7y7iC/SPz/pyL1r0mTznn4ME4HdCjycIvADEEE

lECZONDu6fBGVLAUeTGwpksij13Sjd9+RobsQ+4WkM
OwV2jtpCzIHG4QqbvsnWc34G8zDsMUg94BwfGsCKGL61Dw/XgN8XB/OVIPFaQ0v2Vqt564PeT0MfSCsJ

7Y5cUGWc3loy+QxeXgy1G5JqDVmCaUcn/GLo7KenSp6TOA8CmmCB/35ksL/Vr/OtMG9aqBEX+cIUgIWW

+iopm+PnG1pdmH71JFirRi7JVjQirXrQ2WTgUj82oX
2Sh3hD9M285GVofPHEjoHfv1fuo5ln0UFSe6uNAtTMtkwyO7EbXfaP0A5C9Ot/1K5VhX56zTdJHW+Kellie

zpmEYP3Zvt5gsMmDptVAM1QLmNj8ZNOcRkSNBVHZ/k02Isqzg2nEaYbetffepz5zTvaTxXQOzYZZ+b2D

44J0BrDy2SeH8beh0Vydg1e3VHSepW+His/rk0MsUG
ykIXKmDWCOdqTNHLXhd5lR9TggQwnQ2Pl/rIAIA8JPfeFjAcdI4JPnG7RQTFsSL6VqFJdcDi6OnyZJlg

jRVTXU0++2EwFN/eY2Dfo8PGPsrsn1meZGiXCgfLAKeRP0mhXyxpQkyJ49cLC5hbp68i2L4NrHUwdSnC

E4Q5ouNKc2+Aln3f9/uElUGePmqHwUT6j8JbrPUQyr
plaU683ZzeDL1lnL0ZXMQM2f2qLdI8IqO9RR9VPVWfn0luOYiUbM6uT18YuJhDyZ53d5HteDOsjLWmdF

7Bc0nrA3VXCsPJULjLp8PRX/klsoZN3FNYLcQUtlNQJNDXa6sytWsyaBjzQSPdqFER/4DDMwFXHezhC/

gJzta7eExj27W243dVyLk5N1tOItX5R4Z3RelU35NN
+cvi7apDgUC8O1H9tTyaEhrS5YQ61ZbCtPewY0Y7NDLvIGpiNzQFVu92j+McXh73qYZ8fhD8nb2J+T8H

WF6xe/sEfcC/9kV6Sb7x2CPkHQnKMm50JIlDmeHbe0qoJ5FcGQw/EX0KcO8jaQ1uHuaudsz7kG4mFCVj

cmtALDIxCtPbz68ih8DvcMcBQtdxMlgOV9EyAjRRvJ
8aul14fIr415qiebS/cR888YcgVm3670/eiXHTwCA8/JGsqmJd7NJIgR+3CG0SzObdFkTOKO+ChOHC72

L1ZUXlh068q81VTkl21BhrN+w4IxXcXkE20/TvEfc5qCkA9FXDCltmV1+jw+pByj/DgG25UH+Y5nrf4O

Fa3R30EfcHxPip3OxYJAQiPie5OsN8uxFLUA7P02Sh
qK+Nj5nhG8/4NXw2U9q2tJff7rN0YSQ6QhPzbduui5BDmjk+ETTXm2dvC8QPTjBo/unctnD0qts4XKBr

Jzhs35HG4GcFZox4nizZDrrs9dc/vnb8BS9lgevhmfeMjdxOZC11DKUYfWbo+ym2sfk1JblqQ/a3SkY/

K0d7hU+kjSA3vernWeQkYlFWPyg74MAAP8x6fwMaqj
9CnwJAYr2vOxfzV6umJb7n/6YGG9i3jqbryo/4DWdvv9U6l9whYOFASNa7HXeDaBMke/6TJmPhG6lb6V

x/zLkZ4JWSWiSyivYbNxaZLpKqFmKbxueP6MKwupoW87khXZUagw4UKKQmDJRISdAjNf+7dlupiIvYo/

JCqT9+mnt/8/kwbiHM5elEyHAC2bcBNMwnJn6ZGV7/
/sl7jXksxuFito9wk4xwbtcD3nvn4/SaHsFCoKOMKfOxT0gGifXuMhdOZ9aM1J92/xBaobvKdw56MREM

yL8jYKp5odK5WYilA67gPMXAjkcuQDyE/Y467/mma0rcC5Q/0up+TWLMyNQwvsMz4nDdRKJu2M/95tTg

UAZj3A0tpoS2AFGwbnE8/r8D91qasA7RBzx6UAURga
5ao9dhwbPDcDbNhfLLCPBKGKySl1enj/c5DYek0ObE4Z1oxCLni3Pykz/aaqm1e0+0jyjI50qrKzs+uD

vST6VkHZ16HF6Fadb0xu3g8IUMMKSVDN5wnRB2Hv0C+LBKQ4hfVew6xgxFHNwGcq3FFUU0eyQKr9ppCe

acl+Qe+FEhY1fiRdcKYkQyggptRMUVA5WWyyBOfmuh
FIeJ3lJxtYt7atocRXY6lbWD6/ch77w6Fl1gqWSDNs7pktt3qlwLVSTz9u21BEcyohZY0/HwPUUA4UOd

I9+tyQ5ZokMEGtU9QdSMYCd4+eI/ho+/qsZeVKBgh+fm+8brK0DDT3tjAO/WKGyOimK9DfgDZVxO3PAU

j5ZPDNZsJ4rxIWhVrm4YlERW1bHDYNfnfPfS0Vp5q8
VinScoJS2O/6i16ZQ2VJ5TWhPAQpEnkL263sPjXtO6Ac6GdnvprrMCkeTk0IzK5c4/Jb6kKiDz0KQ5Ff

WWjZAm1/BjHTzLnwvws2ZqMZs7bR6hj/SGgKTG+lBfsKAs4a2zbB3QCKVt5Su0teHNPic35IOlp5e+TM

OAI4VFQH6iiOEYrxrL78EaOfW4NMmgW9CmlTy0uAcu
8Aqa1VUug1lyPEdmev97Pfza5eNz6g1rGRvC/cghg1hAT2JMH9jDQrVBAVYpJyuPyyAdJs0qGItulvG2

NOdifzd6cyMtoRYvHe8D72weB+PaXAT9uMwTQ1Vr3OLr6YAAZ/TEEzVz4wl0EglS8YE2AO0gvhNZIcXo

dOUdXmLVxCK4CLLU1dHXjmSQ5/qAc4bwlzYR8lFnOj
pG8TIYRPHll2CdyRQHjJDOgFggV8z4bcqV1s0qtlyHgag6EErnHEUmx51Bsb1h0ofmFrih0JBHUdkqP+

Uu95Qx4B9BJaZIKMKJ86KztlygSL3wgZHu2Ow3YpnDNPWuED21P5DCuEMkClCGa3x3r42mUBjfsvbOpv

ilprrKccE/9uVpK4EP2vBAUDRU/oLeMe5mXIOwJXU3
pECyieOFqf0ntl2bEM46ch86U0+xxkQB2it2qD4qnC7qI2pLf+98JrZnjhBnFUtg2loWDwr3lOCVWgtB

ooSSB2xkxvCuPJUfLYp/EYb0ngUgOHM2o+Mz5zAfTLE82mZ2GB/BFXXwS/v63rW9HXqd+c+s9Z9CjKsU

WwJZU2hfAVZEI9aYcZAqs0KbthgcDNlqzGM4mJHexl
yaBpllJtuULj1B4+UQV+s8PmFEke8mkZBD4eIuRoWXaBP8+dv2+Oyhl5aA2zGD1F6GpNaIoqsGE6Cw2a

UzXymnFLpN3OJR1skQpz4u+iLz+InkwrTry+ZhpZ5qeQMkSrvo6Q5dld3q1PXJ2Vb9YNXBS1UWtPam2/

ivuPuxBL8Oogeticbjr+6rZT7p/zkhLAJzY13MT/hj
VNie7wQ8kb0qn19s607rR51l8zxMfCG/AtpVyZxyvKJ9UsjzY5Hslrqi/bIVNv48usxu6pfLf5kb2fsW

OvVRTcpZmNZqYXj0xAm4Xkr5GYVPEV4DoHThrWIAF/LoqbISK8oj1YclMKDkYGZjBb4nZ0uaBUlym3WJ

5Ga7GoJij/cyDP6bb+T+rJWFaYksI0p7tW5eEXbu2s
tIOkt4gvHRJ9VUpcgOm7NGoH/aT241H0PgyJBWMtzI6mTG6zSb74gTRtPph3Lm920OEvR66XJ8AyeHzX

v104kLjZtmLOItfisHCo8jrT2O3b7huy9/0UG2SjyEBbwFSTCpBG6SavNeFmYzXf/rx9BJeSOjJmPBci

8e/H2GLO+QUkh70uxMxFHi+5+b8Di8BJFMaxwymzQH
4S0S+fBMVme2xi/JRoZBRd+KHSIklHVFnjeb2aAv4Rn+SJKqBrtaX5JK8Kw/ZvYPNKHQxVbR8hGc3Dvx

EEcgZ7IloJD21PFA6bFkfq0n8BpCjjop8TwYS72wPLMXNIyOmNvH2AATfz1bbCpIu1FM0UQRrKvqx3Nx

XtRurGsCDT+jHixkJiYz4lqH0cyxp1OhJZPkXA21KY
PruGi6XAnga+txJCh61+N1wSNVuDZHL8r8+cAcF/NBjDryhqIpz8eLJc9YYafq1elkcJscAKnfTUhy1d

AZFucn01YZ0jY8oWG5ARNCpXgS6Fs4z3pNMI90OddLDTDPfSg9xh1uxn9NBAu/hV4tP9U3p8UcyrgKnp

W3dK39HaAHyCVNFhwxBNFhq0KKpnVzDufZg7OvACmJ
UFIKmYh0Q9811AcpxHEV7Hc4SFCDGrjTojdpTERhRRRSvz+AgfTm8p+5+A18nJnnoXMNLBd5zefZ4FIL

l70+R6cIG0ZCWOhqGsCvQ3lY8Ri3O7ZksWIrhffyg8jHarPjeyuTMbWfey4vlDdaBdB+mnUWRE65wJam

zhGJ5NgBuQ+w6KMqSo7qmjGvE7RKk+KNTQ24TAJNnv
BYeoo1IUeefI7Kkk4y16SNKt3/NmwJkD7OMZdM3wOKWyJ0UTDq8UYRaFEEdFO5rXh5fxj8AIEkICiyUA

KmgAqlO8R7FW/Qeoo1cTYiEZq0694C98NySdivibEfe/NXEH1xV1puIO56stmDV1ztvKcA7DArkuG1Se

WTyVIEhR0rL6ppG66kQcuwFVWIM2SSx6ekY4ZqfRMa
CQKx1KeVz67XG8nPzbjxDxB3a2u9P79uuUi4+JbrWatgSAJuLmk6Q9bx+mAI6vw08dMAa3aUrReaGZtc

i/JBErCbaG/fZ1CaJytft2llHuSkuxsBYllksXVRP86NcyKl2EIV5R8P1wgPVM4/GBf0qH0M8jVT++NZ

x/Lnu8FU8M41yMnAwyIPLd7S39Fth8CvB0JK5okjdQ
dzm6LvUczU/if5qrKW7U2RdRQ5K/3r+YH7hJo6djJJ5WgDvRJFDSwPKjOBB32AONWQB+Z2dFcR45Ggcq

NAOipubWhdUuOnnNLmUwv1BcMONABA98+SMclULYz7EuQJVY2DDDPVY20UDvyYJBzcv4NwOUxkRk+sNS

VD7n7s7drHGFmkC6S8j3YsMTcjlRTbeQ/apsrcYs6b
Pdl2pTzjMIdNVTT5UljmLGyHNYAuFznTBEnwi/kxg8B19zp5m0pW6n9zVVO/MHj6pvpll8VPl4QA86dy

w8U8gJtsVEgRm+eZq4J6YokQI5UuorboQSxEHV4Ko3za5jg/NTF+SnBflriV5QCHxoS4tx7DnM9Bc4v5

bRDt7f2shpnyCtq0qR7Bmxz7CmtJBDj9x+NE6L2jDi
6yASlmrub4t2wOxV8VC6dVemIvS3FjY6Y9BmxxiTYBXwfo63l42J5fTqKTLv0vh2xIAirCR/DTc0mayz

tudHtEzUif0ESN+da/AfcQei7QzJF+IEdVNjCQ9VkoEuFcv9uXq2i7i7MSr3BtdwGa2v0Q0GHX8pxLAR

5EKsk+3+KFMznt4JewRJESUvqypf725bF1fWqcy+xC
UbaoAIgZpo5JO163r4MhdmRdMzvqGhKb9vOgWE9RdLYy+gU+MsF2vNv46zzCHcknEkVcyDbR+sSumN7W

WketNaeXYOgMDeEJOB14GoQyoeD7KQZfhCTH2rmjO7wOhqsa8gl6L24XYEE9uzsfXyw03/StS/j4astT

FGoz3r2sR920YgQMbvhYaXiqrQaMijPOMc4GiUHPm8
IAcidC4+/4qiwfoaYL5T6sdRqXQhx5lOb8BHsFfZ9YEoViCPmrPF2EmBwJl87o2SJX4/M/ZNFeS/FeO0

3ahnmUXul8Fj+OCzYmT4knV1wxLr42D7kzF0Syn2loxl1Fyy7mD5sm+q6WMIv6YJKuSrNsjdi5WzTYhg

m5qNNvRFvYQtHLzMMI/2F03peMorpu1bc4hFprd7c4
jVv7QkOnNBcTZBYmMpt3A31R13P2dZ8b4EUkOFbM7mDAZtp5V2wVesptGptjleb7XqijeijX0ZH3HPmB

HyOXvz4nhIyEb8/17xzRVTNxloMCaIH0xpkQTemNxcAhE1h1oC0T7GCJBlNGBF4ZPwuY/RWp2v1B2L8q

Qq0lzMSJiluouxH6j45elk1eBccp3teFft7m/eu8qa
ZTHqgGbZEvYXc6wVhZSUrFzr43xl3jl0etR5VD2h7RrszGuFJM3oQyOe9v3Ai6if78igQbnn2xG5ZfwZ

tBMKsdey7xLz5XJn6sOCMx98YKX1o4pCvvipZ6IM03oRn0Mk9Y0XvMNNn0ccT1UUNBm4rntKN7yhtjhj

YU3R1KA+79+5yK9qr2/IE7Kd8K+D8ksrKRXWKxis5h
gt8O9zsbKbUXPHOoMFnw8KPYwC1RurggyuyIOPLNJUZ046YOgptUFukPRHd1w80FFZ+zwtbFQsRE1a8Z

JqX6M5TmpMjpKbazNmOepKIFIft5FI/0oWzQJBp1VKk+e+ShydbEq3lmPWg5wC+cYofm618vA35PsyGM

M5V7A5PIFsBnHaOHFvon86ZeqQutSt1B7TumLaMcTd
uLesm/0BT3kQpgFhrtxTpy+NAby+KQYEEhlVglnWay0M/t/dCfLjOxYrpoAGPZzpSxLZ5MDL8VFU05B9

DJs7JnIM5xnq3xqXmfz+tczPT9TuncZ2ATeTbPqG+Hkof4gpwWSkyzTerUVPkNrbGHc5xGu/9xH6EvOG

2cr1AMR5iMWnRwfXUfaelLBjwM5oud6YrOeXqAqbxP
y8RsG8eScpaKL9U+rcw8aUT0mp0qAh6vfWLfppCflHlpeQ7XDw1RfJ7NQCgslvT8iHZs3szfTEt7teyD

/Oi+0EZFMgNxfyLpGA8munPZAIauGxgzC/jz1IO3GRx1Nsdkig8kxhYWG7jOCEMW2qLUtArfz/pEBQ/d

KMwGJZulh28Dr+95Em0OnIJjhT6SX5tt982slkXCk0
5SkaFcn0sGHCGl7NgBuIRpLSoBx1gWADU/zAQtXGTe20SWHE75YQOUJCvsR+moX97PV5zzXk8+3C6Ydz

Dtjt2scAPm9G9t3+7JB1xYP8qWpX5iiOKvIMta4C46sQXizvKIplhFc6D8zfHrvNi7ArrnuBluW5Ahm1

PpTN/bfJc3JZdXcA5YextPyw2A8V/1hS09LiKpX/kb
GigfPPgNoa9E51BcHJgRtjqpqs45Oa1QhT+qZvu6E033snVRq5asZBpowWOyqRkTWZumsJGOP3P6UOg7

GZ6Ys0Sege61CMEl4CY0sjH8Flk0dJtyPwD1yYq2Zo6X+CHSVNH4H74QaxMeN2EADIJBR2+fgHufmM1e

M+8y9/BmBwnkEeyFHAVhJYvSs+LFRX1pdnsVopN548
hIwyH4CpAtnG8318z7aAsgb5HaXClZZlca8t9mcjz/pwY9CVPUrAtGDIV/K4fk6XOa5CBu8rzcU/5xm5

OP9khd9FdpioC9byq2pR/BaaEQDELG73A2Gii19WRl6FsNm8kUoU5mnK3JrVABHsW51hnTNe2Q5W5Imv

SC9BRdlKrqDAb0ApodqB9z/IjAeFIWs3yJnprolmqU
hgbeXJHuivPwAqv3eWmhXW8RnkCxx8602ZYxtBI5cIWhbD4i6epWPa48Q3TD1DGKJl4HRt2DlMS8mBDR

8M59jDVNnK2l/Ov9x5ku+uOymr/gcMSCAtJCuyK3+acwcORB/7LJTyn3G1TPwHOUzm2nt9g03B7JBwAQ

oxbLCzOTslKeG5sntPUQmlcsk0knC0jJ8V/9YV+xM5
85IJAjiFp9ggpxK1JGfQP0bS1Y6AbQqMm6g/zIoPGqOV/moore+f3o2uSryHw0Io3+l3V3o7RjxaJUCYcha

gffLWto/Jose Guadalupe/r4gOy4JQ6fvhWC8EPayXqM5nZq75HfHNvwT2dgaJ9zIaqtN4HddOB4uV9U3VFbMI0eehl

Df1yfHoQuPdOgWpfXKElhcaUPdEo0oLU0NxdjKmMW1
saZMP2CYwtIsY9RryBjCc5qnUgoce7iALukf0BFtYGtnTGj1SM0p10+lxSrFDEijBx8UZQtd4XrGUkPL

rMvcp2k0GlvANJv/IeJel7R8YouYYyl64Hasa+haAHqHp6x+7x6UjsXRkwO0rBzJ4P3D4Y3M7b/Dpw1L

EBWo4+SEDJTVCF7RbxNpjX8EIY6Pi5t290Me7A7GvN
k5QpuiymxCrRZrwPlAwIegYloUjn/gks4hyTnEx5ZnaUwdSsjgJKRvsbVjxm/NLp9RA6TZ9hxRll179y

74Afr84VHq32Zp4woKoyxjLM78t5pnr8f4E83Yu08vh/BYktOCg/vPlnWhofMd833w4cvalJ0Zn8VcKE

p/at1RWKVwtUWlFxyhF4CL5vGuokfaEbGG/gH6h5uO
OlPGxGfb+jcUVra7y5EtPzjQ8n0PamGqDfk8FZBIRAnY6UEaNGrYMhz9269o4eI8RodyemiHCT8eW0xv

8Qs9X66ZOhJ0dijxyl4mawT+v0Y7m3uv3A45SIF5lqUb9ltNtNXwTDetYQHNygqCLBlBbBtYjhzmKxCj

T0QqmLxihlw8003mVq5iit8ei9MP3tRUp4+T9G9Chs
SPUhwkvimVcc55dvi17Z8kx2/xzNF1F3VwPP6LsTkrhS+W9US9bKSBOYlNbbS8546fyx9zRINgGJz7WT

KucVOj7oDTwHa4CdTZ+MZuy05Nuu9CFc7BA6woZiLaj/LJEmETV4AyUvQ+Fws/3dHAizZyOv3nus4K3h

ZnJDIUfhHA7Ugdj9bZaZ/hVd+pxrulyxoltblmWgKQ
MzvycJk0ctJYCpYLPMUc53Hc99mYTYmn4zx5jR4ZjwU5Elhvv2K+42+qgYpo9s8r+lTv7SIyMPl/C2NZ

4//9J+df/C+hNuc+FJ8nUdkEJDO/KJq8MJ5MCjeef61dcX8EtYtbAYI8cq6smjTX86BqW5DaD/HTw7Tm

88KlTXBw13X8/PGDiXxSA78cjOSm3HUc3zMQ3RGTWR
ZArGM9SnlLRrwrSSqN6tq2D/QGjNH0hh29ywtRRZgEL2gq+qQ2Y8/MHMDDuZscoBkAbPREe2FiVRsSse

iCVWk1mKxmet7CY2DENG3f/wzAGKDibkh9A0bE7vafZObUcYDKNb6Ola2h5dO/3BUH99IdE3R2KXwhxk

nvzb0Uqq+Z7fqJCwhk1rbuwYgcXglmo7cSNW0clRqI
XT5co5UP6TgQFGJMIeTDL+QaySMH+BR+HskToP+pFzrjBOgjt3S38vO0LnHRspKD48k3fcY0afa6UIe2

dC+EqHn+3UsyyE6zEDxv5pPCC5edms/7Sn8QiM/DnBziqFwsD3dE2yfR5AXvXWio9m5Hv81Ep8EscQk/

AvYOtgbRdsP+sSmN1I84A/k5VIdDRu6VUsMfuv9w6Z
Z2XoERgDwq6qZ+YTmfAdglYIRkH3disVFbrSiPfH1AMqo3fQZP81+jICnULEuX08/HhcrPbpjvlYB7nQ

1TlExDTVQ6xmwoi5DL/YPDy+QG7y+qn4tCnVdZ91CICV00DcPg7+pj6Kia3Ml0zr4t5PNI7YnGIbQ0LO

oHVbyfxT74UpKI5fs6SIXbGXvtRVp6djG5xK92Cnr6
EI2hVHccm4ABSoeetNHTfewL2LLAO+yv3Yey81/lF9TLMdJOzilUm/yE0Naz6My3pzTYSiYiZabiHILb

C8tW50myqlrChfcpT039XTB9uPjKahIPSkyDGm+yNhPoACPzz8uGNEWihLwjeKJ7R+WXcr6lIIDG83lc

Hj0yZ+whuFv9tQv4kbLKswb28P0F9I+NmRFzksegjH
nHbgqmt6Y1bzLvmDR1GOSzKHD0XfQFPgy5JvZQpvEUFpfToBztyEJFZJBXg4hQpg4FbZ85KZ0qpKjWEZ

24ft23qixz0d4ShQhJ33ctAlx4s16mT37024tcsJWeP97XAbJeJZbHnK9oow1qMJMsKb7n66pvGiNMvX

Q5kLhnl0I7y2JGuKRoVzvFqbG7kNoi57ESH4bhrsq1
BzyHPQtt8e8W5zSI8xIfQorTFAtKr5pIhE6BxiHOy451QZHifGL5VNfOFjS8OFMYRGggzUPH4IiACQeY

nWMd5uT5n21VglnBqXinglS9NM70MKV9AcZgDDq0O0Wxw47vkOKr6Z4GjkGQrQdXAisyHVP7SHyH2nj3

/4L5gQ5Esv5oTQIoNCfhfz6TTw37Ijn0CReseTlcA2
FLRNgz4ToHRVFff4AbEmGbVDvyhrbUZ2/5Jm3pJssn3lPeQ3QMJ5A7ngTy2V9WPa8i2Tb7LgW4iZG523

pkaN8i+zsu2kK8flFtdDscA/JI5n7VDsB+bLTbKsGuCAUlibmRCn/Z2yzzNN7JAifp6ytw0qL0FKW7Of

V2jLaXxlE7EnmWR5SOcztwE+qkHk9Mp1MiMWvCHFb2
rbsUCU3agWHDSJa9g1fP9p7OZeEdexB5Ukd7b601WzoNaJfmbX4+Wi/iUKAOg8As/9PWf0v6yVypTOza

CDY1IPNcY8TEqLRqe5R3hLF5mcAqMC0RtsKK6xaVJF4YWDAQsuIifwSgT02Xo3ei78g3p7GlqjOHEkT2

aXNdLXC/w42RhMFWW5kVHWA7lo1eQI5HN9g2E2+JUb
0p6fkvkX7d67KJBeI1Q2Hq25trz4Vn128MjAsJHqGc5QpskV0qL1Xh11oGc/xOY3yRQLYTrCS+dMiI3z

1/U7/FYP7XxpbVNecQgLfcD29LmVRK4HwfIN9ld6mZ1KAc6ygbJ/GI7yqEmVQWDpbIe8If7/a5hJZP11

f6d1UseiE+vpmwzWh5gL54jAdX+gyliGDPfv8/4Bnn
/2Nnq9/FrFy54Fhng/gyWj5CbxeLxqq1NbsiZ5aeZgRPJf4URX+wm9u9Zu4SoCXIiu/8J+E0X09zxlzw

Ago6GztloC6r3tY26G/3/0ihlbZfDDiKgc/nPaBj72FFgKG19MDiZ9uNzQqUQc97T0cv4WQTMZvEJm4v

u1dHROZVNo3ianOXwdKLeHk3Yl8vVSiWCGruBs8S/0
rZeiYvJmwib11N+rv06peWRvoirrjFshZZUZAOp6sQ0Dqo4CqQTB3rhR+gSYxISOulOE8N0hdnIBe+QT

2Nx2IAmk6jYkJO+CNPoDjKkpf4gPjhKGbY/U0pt4Ymgeu1GERfSoAivFaspIWZYumB93XyCLJzS6f8yx

hx+idh2Wx5uddSlELF9piYvyaTQSvqX6WZFE13Vw8z
UzYvpgI7p/C9oXOe8phgk0p8iFyy+L1PZUBRC/L1LjSbxW2MYtu5GVzjRd6JzEcCWevllaEbr25Ey3q7

hsgsN8cSqXwRVf4CPmVOKyd7U2eJ89ujq77KrNbaw3BH+pKlgSnGyMsmR3+LaxOar8Ng/PyEtOKlrTcf

5YDSm/3Nw2r71d9IiYX2X8/8lbIMvdOdQzDliQERwX
bVCBr3TlSlXgRzeQP2IVetOBd0c0EvZiOGoUH2yiWabpaNCHNbwuT7movraypeo0/prHk48TwM+tsmuu

YwsJFaq/ZLPIwOIqIfDNpngHXfIuxbfF6pGHaNfrQucPVBncrfYo8icrrafn5SJpGimZJx1B8A3IcUfM

th1Rou8S/hdsFSE0F5VQfwe3kx/H7u5QyY+Y/PHgWd
FaBghIJtvs2rtIGRUcOs8c0OlEV3weJyad79p5KIikajwMdZziIJe9DQ8E5JxBYsc5Oqgrz3tJpvDpeo

V0cz0kerCJ3/8rt4QLet/45F+HjETnb5U8K4rSGtXe89hLTTDdtgescLZTRO0PVh9lUka7uOiOa5u0XZ

fAQVc6Y8JT7l/qQLCIL5kfRT3yeF9xJE1ZmIlSkNIv
p3mdlH/V66I//bsc7nF0l3Xiqinx446c53vgx93iSNaXbgZN/HPuqa+n3RFRsrdlndOTV2W+PRgRoan+

FL/E9nqD1++AsjQqwgrLiYWFAQa8CXEiSh2zLq1LOwutqxVqIp+pf3TWLDOTpQsqs8BMRHG5cbN1xe8s

LvdhWL6PIjliY7UiKnnB3bWpqKgw89kmJgdO1/dB4b
KKE56+KdappGA24lUx2tetbrarO/+tjFX+J4jO9rh7cqvNcfRq7RTg8oPrcUCmKtlUAg32PCheZuc5Mm

f3IfhmF9uMel8Wah6akS7SVnkz+aoj22ns3YxI6KeT8CKhwjPdK63o0i8hqbe1NhgjPB0meZexmn05A4

jQgX3je9Ard9Xg5X482aYmmq48+6hmAR4x9QtX8CGq
ZTezvmJSWfT1T8MF96DHL4ZCPVAlGVPIkAbSE/FmUNR2j7mBPB4GIbWAmFBlZB5wrK8EqjdzrJKEvyvs

CoeXo0C4PY8oh/Ks+cQA4EvVGvlKh81EQf7TDcnN/6UF1GMPyVcX4eFsqBnyRQLl8Fm06dXQvTTPw08o

grRxwPswxRKeZEp7olW1PnzSSKqaL1S6lcixecvo/1
ihtAmgHOc8wSNn58DyKaY5a1zMZ0ZJHfi1T7fj2UlVcZ40NiAlBouJdeoZPGTrA/v/mjmf1gfPJPrZSZ

tNBdfCVKW62RPSDSgRWSwJVRjH/7SV030OaLBg3RZdyB9he38DWRdOKgsko/dmegf+X/J9GcTzcO17EY

+sLQg3qR4jiUhHqEQh7VJ50g4pe59Vlu53UQIVAn4I
V4/xLK/RNSA5FGeTff0OVaf0sw1f2RM6qflwH7z2MDkyaApWkakDFSAWUXQxxgc7RbCAteE+2zfNRoGn

Q35v4Pj7M5bUatq7XvsMheu+1QxQq3p/Idfxq/ib+fs//G9fP6N/Z8AbZ1F/4yVw4UBoQnF2Xvwag4Yf

El7OQwjW3Cmz90WBntFFj4v/yWZDU2DmI1oNmFQteW
p+v4jh2crckRuWciFAtbkZ6+MNnc+AiV3GnLMrABec4qa8Uuz+7RZM26Wac+8WCeKA1M+8i7NfTRh6kJ

oSx2kON+LwphqilXSgTZgQPGMZKD5qyVBi9aDHyghcXrAwMXsqCBavkfEG8Vw6SNEru9jYrT4u+go65H

YdEuxMtCd6o2kktMF96F8M2NMpQlFLLpcFVqZMnQZN
bVYsF0Tdzo+CWin6gVt2yqcscOzAzQgzKPoiIt+tTsWZd0CvHoqWSPi4Fd0yNu12VipsF3qLjI1YjiGf

cWDBkgNQAq+z1IaibRW+IFwh3x96vM3OL4kYKENr0fCAAU1ttXDz1sEqoJesHQ9KzoSqJPMH3VZ59sdT

l8HPPgHPXe8/05yUoOZsNwecRHu22cwTmrgm+zCe19
81WGxD4V6Ye/udL7N1sKrMEWjdinx8V6p7We9xXyIdY662+gzIP6SKaAp7KD2Bj1OZylQgM0mbuNLzfp

GC7bjwm/ixd6eWhjnzKyFGm/ymTp7Ad0sR7Z71/yR/sRysLyIINW5CahQTImR/+bhtWP3Ycc+Vl0K0kG

347KIF+nqG0vB8SsSJngiAooC+kkkEsVFjo1Sql8q8
xBoNT5oBzwzIXDUk7b/Ygad1uET5qsU5y34yYIxjs1LBPp1TsGnOOhgDkfGc7KvHpHKxXr0Uu4lvSTuN

AWJvBlvX3vEa7yHJ/7bI0qXppJWA2icmQ8nDLPWO6pPz8YWqNY3pvN+hbbmc/uCMVCHTcQt3iEXBnIVl

KhzYHz1mhuTBj0hxzL4oqTUxfcWG+Sp+zWDnlL6OmQ
/5P42+QawbwGsMa3YhiyAjlzWjL7+8CPt8EjrQn2oBWOAd3KS//lbVQmXorgGiQmpi3kspO91HOm63kZ

XAlNN+8jk26sVDQ+DBhEdvPvUnplhQjxUUptEtdNQIo/0nKKsFdUgsndWuTv1I3ChUmqbrDZZ076Xsj0

38MdTFA+jVMYcM7c+q0C5r/dT/+vE0G+U1r1v7Uvob
XR90RxYcqZeb2SuCBWPvnZ3zPeLFi1d0C5AOcJkPxb8RZP0TMq2zGvFgdssD3Yf67EeJLGqBpt+8wztR

p3TiF4mZaUFsosvrZmfC2lUR1PuVTNWxaqmbriL/jQeFySzakAFsZg4YDPZOysW/k2CKorRnMPtJkMYP

H2Ny+As0I4olNRJGIGhep29EUj92J0+OejjfsEfWTh
OV4We4Q7zhDfTwGoaXc5iHzkV4sr66a+4SBJhKQ/oYO0mnv2bZC/sEpvQRUCXCUMR118wb2cxlHto1lf

7oLZRNX1KxSW6QA/qhtD8tLgy3vYqIfpp0S9dKeL/3hQFB7Ren9GVTU85ZS3PEBjfodEbspMurU81Br7

Di6VeVsf2QRedcKJ7RG41pMBLmNIAAWFNDI6mNHDx0
AbLLMDN4NW/U+rB4dzp8k5endpccfJ03b/g7fWlMo/gftBMuKUlYFUq0yxEpnQgG1G7skx7RzusKwRzZ

7Js2EwQA1xcCt+1SCNDZJshYbkTfnfOrKu9K5lBsgP03HW05AL021cPu+BBe8ODwfKDYT9l1rMJttUkp

cSxNrtbPcQ85M3EKVr95cTFx9c9O1imYnK/jUUFU5C
Rw5HgreTIS+GS5BNxUq7ACCLlfRwCgAZZiEL6+zlJBVztMpKgWACrF+AJm61y7LpdRqz/NITUPVQr40x

ALsTVWQ53LJKupxbFM7Wxv1hkit3eEtM2anD+LrMilKWAuxfA3o+ZtqNFTx7piNwKKnaD8PO2CObM1TA

1zu50VTo75a6itRCbp6lsQRdxjvpZAppOivPTE9zaR
vJzShKineieA1PzFXG1z8CjT/YBgd4YzHNZFSZA7J35DzOWWkAsB26IOCqlNpa3k0PTY15OZZ25m4lf+

Vf+Vf+B2qxYFbYEpR6LOKC4rDHFJo41DrAxSvU5gM8/pvISH1jAzkmM0f0GUK2xYVIlhS3zE/5JueiQa

Zh7e1fC2Y7KHJEoJLV1hknICciKUqoGhW+UIhcj7b3
8Og4qHxHJXtHTcQvq66BO75tz6xTMUQRYnAUHx/vlyW9WQznEBv//783Cfd6qHPdXygDpV0L0pgv6USs

PvxgVnpTb1MqIvo6AaNHuoNFJQoBvHVBBGkCJWRRHSsBaQGAdFBrk/+jJ5eFURzcNE5jofUfAkpm1RkN

c+ARUcy9FFZWNqbcU5i+o0813ejuWVSJZvExVG9FI1
407YpGcuW+6kvkFdIbEywyrXG3EqUbhyugQILmwmxLHWcOyj/qjQURv2EjyDcpByqwrnSgIwOOxcp4K/

qCooYBe54Pt3ug7hwQqqgNXtAHXRwTqLpQ3h4x+b00cRndLFFFvSQa3OpHPvof7gWNLOZZD3sJye0DCb

RJj6/eYwBGQuhFk7IV4vo2u/4fOXEmibLHJjuMRkdM
tSj/XsewO+C1ZO9VRYTA452xeJAIYAjzfe8VI4rW6PKS/vSW85H5C7hfOHVB4gNxoIZxgCAtYZQT/yXE

ylTtMDMO5Fb0WSvAUPfscmORX0PBmjbMW66ZzdLkCVet+/WIvrjTcVgVLKBqxRt2MtSwKdyKIIru8tgW

GmTX0rkiTb0uvfQaNmGhEJRe+Y7kCPNyN0nWbWnCrm
aLoV6pxLIurUT+uEKevkTrTDYLx8Bv0RMfFTL/G3uBnrpJa/+1D2F10HQJkutyz0YLjAIFI2BpoDwAXz

88wENorhpHjQRiLTVmbfLWLyPXN2OHH9iIcJOJoYI6TkM0MJW4NRejBMw4lrHjWU5khKhJ8++cmgm2uc

Re9O0mlG/VpHmbId3BHszFz5bwTAcMTrfIy/pES0B8
NjzonExpw8XEr/0fLNUJQgvtWeIt7FrlMjdHtOVptqRMcSDyl0rvAEjH1JNfiBnMV+2t/bz3d2IrtJdo

7GLWqZiSgpc1aZKw3WVQBsPFtFRRBFwsKeXo8ZCZvGhnY0rUgGBhWqxlvYpJ4Nxcck8ktnvLDUwph/N/

IBkrVmIerkEty6ChEPRqn2gNFCCgw4BLEFoqGUti3w
08+9lPzwMP49sKFwALITsZKRuXSUrzlLTnRbBX4r2h+WBTDOcW4mIxOWCWC9wpPS9edVBvJ5J/FfBMHz

5qNv9obmMp+hpuIsM6TYZEYwUryLbi6Wx+kjy1/SwqVLuhZDNOdqeo/RkKuhrhx2+3J5KaYcNCRmewWT

Xa7IJlPMwJa8fECYPQbi7K0HMRJN8J7KBijpgM4K5A
7R7a69iVNj37Q8zTO3t5WwetYYh8h95XHeGX7wnz5J3Xyvs7M0e8Q+noskCGlp82kZ+6hzBHD+Rcz0mi

gbV6q9EEbQcKy9zu/Q7zGaTtbRXX/LgM3ncRAbRVhIei9bUTJQGaLV4KW5IMO18erTKr+hOZ6xdX553r

P93GNJBGhSZ7PQx+aLek1nBgUPi6Ct0s6Ucbf2sNFM
QYLKhQFrojrKRPPwCZAMw/1zOEYLNjCKmuPw3JuyIOZgBz2/vDKWTmWvDBCNBvpfREn5U6QIVfem4LPN

pil2q/C0JCeNhy12Wndce3bIHUIGgjs5gYPOAef4zUC6Z90JorokcvItaPh7+WubdCsF4FR0z9lZM/Eg

O44x4pLWzXegxY4RpNQOyns+PodWGtKtlref2CHAjx
31oQ2gHC6SfBvoHXjruH+UBKL5KAUag3J9llfEIO32xgVStg0/BrhWbTcO2Z/gJEFhUki/EwjpmSM3XD

9yLOS69NiQiAgXyyH1HGMJBEiNLeYM9qcWUYcRpFw0iciKt4M+A8lylK2NTzlUQyWqn4urhv+CKkOfcY

hYLvtbIHGP2kgJzbZjrVJ+ylGZ8LdyIsG5iLhPvRMM
/X1IKylKEvcrZMl+jo0e6XS00O325HgO6QXs6UO91IfX0SLTI9NX/kks9uEHv7m4Yl5gT2IhfK1VFDOK

DyOVjt+r8thtLAz8F/l/tx9EZTwxJdlhfhMbkGkLTUhP1+nk/ht/3lGIW9fy7s8f+cgpSa2AbbmvYBUY

BbrVw59d/nA2LVmr6NPciE8GecLeEeecygd3KdmzA4
xb+6O9n9+X6r3qov0tuM0x7ouYep9pmtzdMUACJrU/2IV5OW1u042N+WtTTZGp5r+kEdHnoQxVq6XQYu

2muJ48+69qkYciQ7RdnTrLnHW0C3zkPmy8BdZdrLIGHoQY/Y+LlCVgfW2SewdG75zK76rAzUnYgCiYEJ

bZKq2Z3IlgGygQJh84Cb/dBS6MEhz+AyNnRpqEXxJS
VblVFWzaH+gj7/ARaDWbzsrNcdw2H0aSvd1te1BMcmLb/GRfczAHOACmRVboa+FmjN642s2L4m+mUULr

VmrR0sJvUzhppXiScqce5I9KP7yjd9ZhrvPztBz3/8qenGwnmkdiu1OYyEItVIGJ/6Np6iFc6kY+zAdF

01PZ3KtJBKsh2g0/ICr1Ir3oewi4MNlBFCwe+EJKKu
XoCPv6gl51T9ShnTnPuPIulP8JsgtPKqflVm/YYd5XU87ogRdKGpAWUqLwS7pY7pPn2vAFvqqoSsPZIG

029FyJxT8BDcdGy1h0jkav40FgLG+JOjCVWRVmlryxoCSOOypcHg/C+lUwdg4xI6qzIVsE1swNzj/auU

2YVUtHOynNbjBwMIdoBtD3qb/bqPHJZg27thKQEFYq
RhUkrkHozoxGYQ08FzmeFWG2iKQq3dgKrKXnEAFYWO+BZYVC2Ag6enLmubV0lIIUuieO+avsW3rxx4RM

TpeGCyraOPhhrFFixMT/oyJLK9yh/Fx0af6ZrRx1U21J1yGYFBDMRUwWRPJik9PydH46nBJ0ensGatc5

9LGVX7kuDRW6veaQFFumWWH7IJm/2IUNfXSLJfJiOm
xLSsL5hrkw/Cg5aKgzdw0qU/LXoj3Fh9C+cvmBPRYqSes3BHlurFnk00F9mHxyV9C7wC6c2H/0O9WmT+

02bDMkXoORMel5v3yBu8gz03k/+ppFPtSdOvHOYoy6mMxbhxDVCGCmF+mguaAXu1O18wE+8BvSfhzfeA

MN5z+6c9h82+M9f8YvrfJeyxWE1gpLYOZpLb9S2Jwv
SVSjzmvxYpoGwyIJ6NZPIT2oaOwoY5rlYbAmo5j7eZe2HQEMx4SP8+QpfSXbtwRUU6wJrTvWublpjnXH

mFonQ/YOlkjd0gMS+JcX9wt3Xv9IJbQo52PPlCYFyHq2D42hewKhVit7QL6d3Y7yGYBe8rjkijeumhei

p/UcI7vJ5AI2X150id8Nuvw0rPtRsKF3N7+/LxCt/v
YujEkYMSEKeaKyxmEU3NGH772GJ+Hew9wHcTY+IqYpgJJ9A2oyG2d9z2Fj8sbaja2P7wye4b65wl11lB

g0W6RkdsRvhoFw7G1IhtALhuBwhwMXUuxL8K4w1DJKhG0duyfFlCSBmoJITWbewrb+FqXrNBWhQFj1rf

eZLvnk9CiC45D1Zjzhl0v90Fw9H+6GJGEROdpSjTKZ
a8tvsfKD2CORhIerJnIcf6nxQF8yYGTD5I6wtGZeCDHP4u3D90x0Hx+mXU1LLHznqrYWoVsIp1063zhG

TQOTERUMy5mAJRaqjkKpw1np3m4FNWBKLpVOVrG/8YHZ/8yXRulqJWLl7fIgySrY4N8j2N9p8lpz5Mf5

lIdRZFfmdidTR+xbMKH6LHafyA6qiv0D4axikuv2DT
sVAV6DF4p1cD1slJPJ0m4/NDVl23Yh4HNcEdURxLTOH03mv5DEbQHO7Wktt8fg9dY5Kkly3j+QnvmgnP

+psNmQMGtqwMl4QgyZ6+cJe5QhT9AO5giBVOzyx0VnKZ1cAaHoxc5lr2oJ/PWCCec/ozF+P2HroO4Y8Y

mQzZbP2cZriFsZ6k1kzmgbC1UMYW2cfgv68CAwaHej
5jk3hupcp8kpKg1BjHwrR6U+kYBM9BazcgpATWlmvpZ6x+nuJSTQ5rrlIjelKTvXZmOqkyCuNXqmeegw

ZH/NcuXf/K/2IX2iLdxAKmjVjpGTv5y6S/szqFufAHOiG6E1MDQT4xzPilyj7MvZA/F6OF/2/ymQ7Gs8

NrvKbWQWs7/xohZu9VGKdATboIzZq7cuOZymgGqiDn
YCuxnVZkbGBsR/VY4hhE6ghHPLRtGpQJE9mFQXmYwlDMnnFLnHnqgJ+hIvEk/muLC5v6kaNBCxllPnmL

OGnfIrgMtEbJ3Rm7zRGxeOA9zk10c/nFLPs1G4kA4lMa+O2mAjR8NvOROEFTteu1uFO8Kylc/vPg/xgN

QTl+3u6LJEqouJ++Gpdn/l0AGsh0P4QOnen2Rf2TGV
HUqK+un+dkmEsj2v1XCIXZRoXW3h/m5vuzfsnBWt8UBTSNRK92og/pub74X+xf7F/sX+xf7F/sX+z/TV

rSemb83eWKb3EbRAUDUvpsLhKs9B3jc5naopriL7hdAbvOnkSgnNtm2V4TLwJ8pNcu2L72rOHpYIF8z8

/RowDHpYv+OLTBZau1YcufUjhq/X/1juwufY8ZlhnJ
QG6YWWZyD1v+qrvKi4Kwh5oiqjqUtJ1eUDIU27IJeBxNAl4TU33C7BkMMM42lA+rRKzqhMWjF7//9mHy

EDcSDl14lTDlPmE4Zv2TECVxn+NuHxL7LU7Ajc5AKMSubImB3i5ZtfQNc4/OPfaVTapQiYiLwahKyo4u

tIPrktExuy2lBovDbfWQgsjgqcYRU/5+qK8U7vptIu
+ExR1ml9jMgle3YVuOSb29mplBD0fmPePZ7Fo0wFF5FrfNywMfxJcK1H6dFCES7IbD4+8vHKAXv8+/MT

px4/aWXdtqTN1mepQmD6cSwx0/eLiU1mGvP/kBluFVkuw4aY6lgXFndPLMSEAjnnG65nTN/MWijqGJw/

rSRXHgksdl+/91GsUL2XfoRHRAA7ugFL1zE3ndly5I
f+y8fENkBOrKqntIG/KUe4ZJU5PvHD1Cb7Mk9sAj8d9GUjbAXANrU0FkkBpag29yw/oSLzfMMcwM1C3o

YVZL4bvQAs1SX3kM6o0CtsURoKoCcuDmPKsZl+nNmQnXzTbLYe1MWKCyVpl6OAcDO5wuD3MWZ4YiSj3y

vYHxc/4XG96t2jByMou7cAE4f5O1e7hagXxi71kx+u
Ls+eATnHTy9w95NgHucmPW+d8jevZVa/kSamXbAuNCXu1597ey/fAfCzjMVpyVBWGahMLRiBOSnnVGfF

GCT3OhurAzmDrmSvHzj8X9+QbCHJUiEEV0xPU9rWgoIi79iratqqwDiFQ17lqAFOjxBRyPeH9lqI/tTh

1eqN9uVXRf54B1vGd3AQbYk6YKeCFziMpf5uwIFbK7
md38+DvmJHZHTGGVvxmtHFsgoKT3LEQE0CR4bbGtf6hha6U2+QD35shl7moBvEF2HczkC010LaPd/KJ6

0T5vaKMt5qglQblZomwF0aBUDkTaPMNqjlGXr5mBBhICGgL+kidDGrxE+qIn3Qibr8vV/jRZ/Cc9hp3G

GEtbgUlX9smE+hFSVAps5ha53pwNeuDhOKh/NHVL/K
D/LQXy8fBYHnq3r7k8882I7zVJsFA5+z7YhPgIPKp6r7lwiMd/UAsUO2ODmmfEITVno6wVBHm+ZVrywF

K2HEQvNy6HFa+Z6HR5TC7T7mhSq78hYxBTETGPcmBf36PFcxyTL+1gZs4Cun9axLUhNMf9DkMO2UkQlm

Rt0c000r5itMYtqbX6O1VVSx6js07MXta9Uj4rNMUW
YkFyQ3gybwk12MYjdYrJj/pJRjbu/kWxnsu6HZGxDEnAv4Z0sWTEPybwySTs/2M5vK8twp0FggFHMvGj

pnkCf6ufIuvTYfnhgVwHxVbsRqqyJL0iNXkZFzY1Dj6eftg1fP00VAZhuV0OB9/3cQQCfyN9oNNiS/it

SlPpz8HAgP5MTQKrGdtakV582LGcmK4E8laG4cwpvg
/GmzBLOkdgVwG3+ViqVdaq2lu2gpJ9xuUoKt8U09RvQK/s1XkXuEuhL5oSqjYrRkBi8E0HBA5kTfewNo

4gf//901nTgCQTspnSFEJEIRa7XRHp2TtUxF8QDmKPdgIdwFkkeqTi0hz6+scFxK19ma3Gf6dl+9scxt

tgIh0jGm7pOupZ2T883IIFp2LRwIY36dXNscJeJZJY
YMHSwRdVFYubIsilRrbVBhHSyOfhVdIJWU6dvXvgN3Px/RHChGc/8bzFWibCEk5TIwd6ZiLDgb+cj9VR

P/8RqNjmR58wRmSVsC7OiwNtk8DmsVrBoNa2PWJT5TPhrgGk6/bskdmV5V2L7GuZXDWmDrtagIop3zv6

0E5zUev+uWY04s+UkqpWdDfdqD9gHNutWXgQjgZv2u
0I4EvN8zyw+JQ4669mfnEQlNH0kutbi9h/r5DAGAWMQrBFIRKE6TRJpHmkoyyHdUkokl9kD06PMqEc3M

p+cXAXtq56KpPIvS02pi1SQj5nSvpZLRCS+wEVdpANsrq1qqDrCzepi3BDc/603YnJZa27VC32a1bcOA

9ZaQLbxMpViqdBTJiN09rz1YcYGBf6IKEgl9rxDQ4z
frMiDA77AapLR8baOtVlIrFWLTJJGvvYsbIOD7UwPLiIfv0u+yKt4jdaXz/v1y2olFmdwrVGU+pMCxtD

tzanT3hpJSuhoZAFhO94DCazRLOcTCtOcs2GBqT4J8rafnb7t4tU/2cbVWMvwDzeV1Oabvm84/rR+fmq

DdLd6Cx6t0zRb2hyPDwixw9rbuIqnZ1BRHPkfuPTAp
6L6Lht7tRkNOoUHSMZQzQpURcAHOmbt2PGqHGb2+TNSCBwOv1sDDPq1vX3QHzuXeJB8Qdvmw/hnMev5q

w8q7HH1jRDA6gv3lB+yq89xuuXsKt8KERC8c40esfRCWOCZwE/Mn3sekaE1C2L3Afq7T7jGvQ81f8nWJ

f2mLb4fdmF0EUk8DSYQkVaP6Kwr/nMyCWi89o0xy3V
DFtqEcz2nCyjomwmjx4T0Xfq7vJZp25YLZmfAcpsHlPR7uST3o30dX+x6r8mUMdncenVeP+OhV4F8tAu

mKMpctplvq2/gHp090aWwGyjnEd4R7ra8xT0R1ZvWh7r/o+HepU0B8q7gZTzRzNmXI13Zq5SFC2mW7aB

P/iaPUU39bIj+KQwAFqAy1ncN+3NMdnOvmRLQSNSkL
PnIWHYJGF/mQDJg7qx5gT0dHM2zNKScU8aXlDx+zgM3ghTTYynfQufh9tdonh0XaU4zsqAiLpDimf9en

i1uBn3D87QlsFAnV3qax90A/A1pbfMapWEgnILpcDNB+ELYHEs0axXur0HeaSRt2wl9DBgVAxI2OuW59

mfxMfTXcb/zB/62z0f+ZpUjXaPcIarIG5RUa2WImzw
VNQaadGj7EGFM4l840nwQdUeHL7A8BbUhbDRGlZQ67Ke1+4BKuvs2U0OHoErrCyZT7ib73eNO1+nXCoW

fLCTpEXY18R3fUyp9SeKjWGCP6INi3oVdoq5wKa4QB5ZGm2PIZp3js4beGzE12zg65ybpHU5ae1t6q2s

Y9q1KTxBjdJreYbh7NCJBcM3SN4rA5FXfzbpkXr6BR
0i/5+JaUIbFF/7FfXxkhTJ3qboqiEoT3GwbZ3zVmhr4Wvo3r0S7OMGp+iIo+26D3tKjae/Q+W9LIEuDU

2TjmA997Mo1YDgIT/JBQm/0NXgmN44DhS5peVxCYwHWp8/2H9Wzg8Q4tfQuLttYayU2SmIgCMrD2taY3

ljCOn7KhjaOTsKAE4CI/i8X71e60IdYOgLMmEhG8D3
+KroHmsjK7INJI0t6BPuhyEG76fyHzn6PIjQv6lxKi2kYlOHuTXPTqomWzYl8YakfWsKQtDdMZaOUvfU

50OHgHSWXokXzLdS6xQTD/HxdZR+gBTYiaK7al+Tym91qq88EmZhgnzr8QiAcCYgg7cv/hOg3GxGkQwk

arz40IBZkYEf/PFVpE0zwi+2z4V9sdvrZNhY/JMa4x
JtmtoXH32lVVm7uClm57sYpFdiC8Sn2Mu/7t/YZBeJgiR7sf8ShnTnJSHZiqWIVgKY1eiEM2bZl06+EZ

d2CdUM4+1HPnnHsrhvxoJSVffnnquVg+Bq7Cb/YLbX/JueotAy1JGgYFuZAlgo2VLIXQZA5ssLgioYUk

nFEE4Rkl92pANotwDt2k7ULX5b2taOPh4rJObctJp4
8oBAzp3kOhCwLiJ2OVkEwOd0d5qIqYM+cZ/Q06ZHKp2kuceEMMcwpCIYJAZorLptdXImzoQhujNQ08sp

1z3kXbHIcCeB4DxTPyOG/4sjrzVhr55pvZ+wSlfck06M+nPs0z/kDu3FNg9Un3Py17kpbtbgKxFpYBFs

WjuPms4EOD86+oknYPzabLtoqbCV2ipISPJTG2Rn2L
24cPX5Tp1SJyEJCZ+IHQJuv+7H+Su0HdJBLpGM+nwAF2HLSboP6kT6+3z92HtDXRQmfOFpuNyppvtSuT

5yl5F5ayRdipmWXHDST0FsW2cIli0IeubcvA+8hW0cVaMUl2BRl7bU1g3Vu2WPV04z0KSL8R3cyySdGm

wZYmgzTwPnVfIM7EoAG/4gw6LzuPc2gB+FFBQW5bEy
Xp/NPJHeXUZiyOQghFSD+YIk/X/FncxOB81o9Y4V5XlOQ5hmuix7mA9RPJ7XNKFivZ7OikWieZshLTFJ

6OhKTL2DUH1hWDGSlGI4OD6Xpp/JJ8wOu/deEYudqEGtBwA4AHB2Kxx53slgylxeUumTXxJ9yULfogeq

z8x6F0ur3/V/GIhGUR629QoD7I8/IdECTFIM3tWMcT
yUIOS0zjJ2Mta2JmNYKq1nvQz1IXIsfjY5ERpt7mbbAqox0aS1UKvU799ulknZyDi4urYP1Upj5LDjfU

VG+APTSio/VM7YWoh/O1p5K6BJpLWcWJVo8l8h9S9jNPygadwy3MgzrjsdHiPCwlmo/ur2TXYYjnIIZ3

xmh18R7vOUXlTzQEqFfmPfsdFI5UpFYLpPMCRVPF3r
y7eMeXyLbbyrOzKgg6S4kIOjwn3eY8DDTsmNME/iniKTBHshF1iCE1DyOXc/vj7BUn/2pEzrEqLbgHg2

Ca/n3WcRVOrGTyDsuQ23mc9We9npw84Fh5hYb8XxzQi4C3Xzv91/3RjZ4vbEVtSLxnfGRnNpC/Gt2dCh

0c/Mjn1Vr36qtZw5xb33+3M/s1qnykW0Iq1iI24a90
DI8Ls3Inso/EC8UJSk0IERFH4wzqYepPxv3VxiS8PZeobq6Lz52bw4WNPBmoZV9H86sWn7oVn8GMwklK

JL8ojlh8cFcDCAKSLHoSH7p70Bok4ITSOCDq//5xQxzRN0oVKGszspuBdspA2DeftrBJX41x8c4lc+lt

tT+jLs8wKSD+wNedeJvLLkqeMVCbkNy5UPyVQD9KjG
Mjbryrlx2llxb7Ghiwxdf7TbtuLkn+2AoP0oYFENlUGZCsMAF+UEEf5Wq8RAcOxe4Y6qiVz07lvzs0Pc

FZMnTrlKzhKbZh1xcvZ7fk5Mt2RkY4M/AhLnwnwWJixNcb0zTGXjtAezg0Jrp3XoYcgzol0FWq387G1K

/KVusMgsgo/mV8uR4SDYd6mJofdzsqM/7yKlsS7XsD
SyBXHi5YWvv+M0UVOaiOPcmMIX5zKcvmYwooV8smJKrRSzdWOnqAdf0TcsOvxjskE1Rcbvm1CmPAtqDC

YMuSnMtmUHkkalpXQd9ry3ANLjHlSHeaRbJUpTlcMgiQRXCub7660VIyHzxLYiiIvn2MP0z73/Xs/wM8

+Dweu6CC1d9ITJQCzSR/ia5XMh00a6Cv5+VZ7n6rJg
61J+jYUuNg0j9umTmI3Cy6jkE78mCq91v+NQWB+ckncb0q1SKZqUKOFstS9GrOTDGYi+jmehVtIbyBlx

JKKyLF638pmznyCOwwRtfklRBVNAk6E4G0hWa2sjiKcCPjaNyhpf/gwEiG7naA2CEQHfSEvSolatAbzt

bR71tDWJZazzDz2+9mbTUkKL6LXTSO4Mal6wyGFV9c
hG4jEfZ9fi0XvuyEHIVHnd03G3aR5nZGGc6v7oBGd7TjO+TsYnCy3Y7zzkLxutYb84DHLS6nk+euwl2M

f4TDO9jAgHCNZ7EXnAyQI+IDc7N5kHvc4d7IUWmsyCm0RXHJF2u5RtMQBG3Sa+5CHNvufAtNXsbeuWNS

0XBgpiK7jNyhRo5eeD3bn6MRxUVZjBMl2wKeY0wm8V
huhYZiMR6xofctPzQ+kQsf7xHEyPkizIf5XPlFyKndu/flZ4Mf2ANqsjvjIdl9WGryoIzhogkSheBfPT

b82v0CnXybA/kGumj2JZDtFXKK4OEjMANL5lt+baECFejxMPdWaYUYfv0hRyIMudlg5zVRxYaLlvyxrC

rGUyxVZC6fU0vXLpiJYkOtIFjEav1sqPgHmN63qSpe
w+cww2jdJGRCxCpdXsUsfFZs+1GyfxfpmnU/Yohoqjxe3Rk8zq+DGZRC4E0Jlh5sW1Ezwcal9laM2qT3

nD1q0lGJcCLv5uUxjhkeI/nBnOgL/CMAiwMawKATnkqiBKvrnty9x/ocPtRCiFTflX/z3ngvRHVJzWPQ

KLaw2oK/CHTASV29h7n7iL4fbZo/ZTdGcx0d8tGjdk
XzXpmM8NOfGnJIESuR9HjWPRyK0iGAnCeHjtFoi+BvyBjMtI2g+inUbQ57zbq/kU1R6xLWdGt3F667K7

TgO2WJmT6klDpsK8IsIM7gDKmt2xOLA6v3/39KNF+676k8PFx0uPtdIk9ssqTlXIRSgo0CssNFFqxkop

5w5Ze0lD7wro6lQjp/LvGnCvckgYDLzoHsnQfIztNn
+5N1bygcr14UAbdNuh0nws8b+vgTiriaFAuig/xvE6vRW1K95jRe4GjxrYDxRNF7NPCtznVCwxnfaWvs

QynpefKlEw6qUDLdu1qFXjvLFcxuxoGUeznG6t+/iPJU9Vi9WFQkwnnILeVKNGLqOkauDlcEvN6odahn

3RIDNTaY3h9ZzcvLZPv07Ds5AnIUVGR46hP3foZnP5
Qa7Q/Woi2bOJV0zG37muY47gm41cIaE5OONpkN/Jqtc1HzEiKNEVi04MjLoPtGuLkeFvgO8eAAwmQ3YN

Y6a6bmir/AXnbHOmm/7sg2Yzl/hjY7tCOKWpjtg1i7U1bz4XH3X9O9A/Ck3Fvksft9GksETbWl+belxB

RdTNnz3WXFth3VWigFRgkIF5/vgljeJHhaVegGSimp
k/mjlOBa602UEa1490Zdx/9+OFhrMD0OfsmpHqWl9BtxXDLnUVRJNKWNmG4v3TZ+YQ9MnlBm/+V79nwT

3GNdrt6Urah6OBqmrUHg8Bug1I0c0lpohU+3jIOgu4br+25MraJJYqX88MM6Hpe6E5jGXVglHMSPLEVZ

XJZ4hl7qSSqj2/OyT1mtATfc9HTHMztUDiQTmIspRh
DC067lrzkHKkxS/aRuxoaqpCQ6/fv7nsZwWFZ/v75oGdqOQ9qmz80jibXaHq7ssMhv17ujHNqFRDKgpN

sFMwND0UI9BbgZ0r+eAs+2XFetI4KwMmZIJ07QYRD2PB9NOKyN7PfFnavJSkvC4VpbIfqlwvOA8+3hIi

h6piAHxz20NHD93iwktzdqSjzc+uq/vXxFCG1cG3V+
lWPh9MN7LYd3GVQkZvHbQlygolMlkR55LoL3WoWGjAvkVkJc9H4YShyiUy9K5JK++6qioDEpywYboBdZ

Ot+iqJgT1vX64VR7S2lZRH+eC450MWAEQfnIYew80WIRZ+88VPL98KprKhncSaSBeh08WXW7/a+/SYM/

QqCe3jT6Gat081SBNBwJ2VV2OfgtPoI6FO3poZwTsZ
Cc6iDXN/GF0FezIGm0DETgsFLJk0OeFG+dLdqCg6wZtTO91gzTUdH7g5f7OCuQd5wtDjlMSZHJe8Jtrb

TeeRCNnyLU0A13HmQY5HlpnrcE2F7hwCIYsL2CWH1zPIRXnuThZJ99DMLUM6A+wS49n/EkqMdaAp0t78

5DBkWuxVj/Wq4CeVD4Ym3/Eu7MsFWpRqwONzjv467A
UusEmhgr3IxdD9AeIOzBWLKrxBrvp140YljJQ8N/uBOvVZUr524IruG6YKTkzrWi1uMdFQVkf32IIfDt

h+8PoVjjaxOrSK9e/FZymGkmHZdrglJ1w3HAm7tAMwrb/f/tDOfmEll9bXNOqrEeTeiD3O/lFXYp88Ff

2EPLCFtk7wiJLzLMnX7m6h0bVjPXTrvtQd6vBNQEQY
iUVzU+8K3DWWVZ7PYB6YxIz5wY7c7zRpYTkeyc+XhwKf1d0dhr62aly0n5ksCLtPGU87F7ysiytbc6Hg

VeS2Us297vzfnUBitP4H16ZcvabOnHKPFw+6iY4OEAphkEATHa8cudUE+Yld/jbym+CpGhWtqeuxDOev

oWKJi4MH0RRrYRyg5TEuml8b/1UUbLHlvEo+N6yYLi
GVrYUIVPMGj1PvOYfhPJp5LPYMyjRtzUyWIOGqoL2lHJmJK92OgYf2rY7AcT9M3fhrc4WWP/uXrBxXfh

eswgciuFPAqAIk31lU9StoAWTRQUZqJs9MIcjdyPmv15hKM/W9oIfuVoG4P5ZySWpWFO1tD8rmzrC2rH

IdP/MjNcU6VNuUoyqhei1qw3jMK3Dj3o+G8y80VaQq
ebq8/MNQIfPPIMPXMmFunvI6EMzr6PzonMxwuClKd5ZNGSEo1V+1ikzEDrWxRgDGJpimK+oiYfDIwcoL

JE1fRc1oUBR6tDtUQ0XwISzPzb1KB6hLz70b3PTSqHAuFWHL9ckqsE+rQLppfwAqwWyAIE9ynG1xv0d2

O0sqFCBzwBFiYDJctH684opwdtSypa+4HZzsdb0HmK
1YT1Lj1i26BCWYfZF7A1dvIL/WpwrZdPK4hzqw7X3malS13JzgFeaNk6zcyS0HMKHowA439FeNA3ApZi

+WBJO/4w5yQkV1BYXel3nZGbFXyFpk4F/j3o2qkvb6rtn+kgDv9uT1fmA5HrxF/gZfYAIW3iBV6nin9j

twT0vXakXNSuxR+LO7+7/lCAyJFToGCyrzqL4JdSS+
I+w/gHJi5YMduPi9OGfQEV1fmednLocyGB6cA8xiXrOdYuWtzBoJ99EU/NvrpNUFn9+B5niaVDJe687R

qc4NEI/FhNntdC/mzWSNw15vkzm32XA4AqaT6xSVzb8KggZuxWN57tWb8v4lRrpRsIV763TkiD5eFNAW

YycLewaef8opLIN5MiIuUTKUDf+pgfaSW08SbpuL1a
y2BbR5scaN7xtoGoqqwPYc1X2KXaiPO7Cl3cnTUxBDiUs17x3fjjTea/nFDIR7x7CNMfvjR8N+U0vT+1

nG12zDqG5pZ8827VS1dF2xTI++EEOAEhliQkYsbLIdNSW2had7vvmL1EUvomTcHAtIXlhHvl25cTatql

yhwJ3/eHjhkZo5sg5KQSAb9x82QAXPdpxlnDCqF1lM
+wM//WBqJsi8kTQcKY0xQgtvt/OT9Ubv5yX6Op4a9ZwphuMcyx00kIlxpL9KCwYKsxQ/ZmqzCs0MSU4A

HVDrmgg2OQfy4fEtUIgz/ilGL+XyzDGP5cJnx05Za45lnr6tjnia46gDCl17pwg2oab2CVBCcETr/7d8

0Hdhvt1LjosrKd47v2D1jQyThigzquEKz4EQLEHr52
l6qz8yKuyH3Yw6yfsFT/XePXFUEce0qh1PlYGUAJgVmltEtOYl2rD3PoTuzRnPd9TaOX8gXTtQK0tRim

2owuShSgRH9aafC9kPyLiIJbINowUz2O3ulgd0N2BWt8obphXd9pzwSCULv7hAD94eEnJbqLbBAjLHZI

51yZGHQllBsGebeHJKrl4bi0aE74hx9TYY+xk4p+um
NnCjn+WCmmEoBmxHlxkeamd3SG9MWYsmKLtdNofdGq2TBb7WbW0UiPiQFwm+1U0hCkEn0qo+uArYYVRt

LEoJ9UZLAKAYpb+d8LKL7TEVEC7n5C0MadVUbjd+dzSrY2pz/tVehq8eLxBH7hqRoLqnv/NGEYfcJQEz

ITWRLMd8fmet3OXSf/IvgakUe464lngL2IdTnr5CtT
nhgMYqxOOtuBZP2WSsOFR2fxoNan41/sNwKIbKz/kik8eFdy2PNHBNTAccIcnfPPe3IXiSCIW2oJj8/O

y7Uj3VVGJ5x34MjtFjhAIZ1YYYfF/VPWBkV2lrSY3f7XJ7ZU8BfnVykjHj/lAHi7Rq/1rXqWnvQa2eF/

wqtvlKsLpvX4LinECtzAutHAQsLsR+07CE6zGU+lks
a4qAixdChX7o4Fh05viMRQv+hvY9nsVF38gGXUtrW2NlKz3AhEtFIlAse5IBJJHMEBkgwyjdMTJnVLb4

nKLDXDjNHP2p+p45oqNMfwpqoFEda80net/oVaSQ6qO3z8nq+IhAiEEYpYKsq9CSamV3K5NmvEXboAcB

PkBznsFnl5X174xpnovphGAQGxAx0ljy1sXpc9Ss36
5YZxGkyEs3LKKhpx/gywGkMovuOthAiCZ2ocvG9e17a0gc926F3WKrW/0ypx397FylmTPWbjvU6JKAXr

LAnYBL3ze2BD6G0mYJ5ZB0lFxbO0hSqt9HNT25pyITURDPj9b4PC7o5wpQWtIpeOSyYI1pzoGHkaF3TX

8sx0Y99cIdGhlVAteLz0H9T9uhANrGrN/rZaBpmOLe
NZIbXLIJLp5X4SWYrsF66d+AJxqOW8nlrG+cM6P0cJ/ZZQVhTmnAvzhzclJA4xfZ8XISwnJWtyWVgSUa

2QGBJOiNvFVV1eo4dzOelgxGJxt/ANZBVUT98mL3V7u+zrkVEm1McPSc/nY2zx4sGg36Y4M0kjyJEfRp

S6iklflyEhE1bmbVLfoffATtEeNrARbMceoEVYH503
cw93fY1bnxtC6qr4xebfDRg511i5pygXk2DFGs76y439xT5zhKL4Hv+9p+cHRryhDQ05qPu4MLJSh+hf

0wL/mv4GhkH3N3czyI0nNN1Dit1Cp93MRO7OaiXUKA8AondNTMV1VXlrglmxhP4PF8P3dVXauegl19x7

7IdXHfxXPOyC+WmqoHhlxkWnoOvYbktd0WOXOilBVe
zQDm33dAa0vgY73r/jRybN50KJJ8D2pIpXde1ov/PPhKz3Ckrbdx9N6vtJwz/9xeoBT/fJi5Bwplsk/G

9Hu2V98ibAZffl0F9Fz0sbZRPV6NPvC3CwejwXkJio69JW7iWMeslyYNyNR1KuqcIzMPm+qXwrq6VSXh

AO8Ty2FrCNPy5tt0od4nXiNL6aF/mEaYUo+rVH7SKs
uBtI99+lRqk4J19DUEU9T1WMRXuJ9VvzoESa0lkV8nHfGHhP+VGjthOVV9xcSKg8HMqXuVjinVnYWKmq

qSybFFOsyL/ewI1rYUHjtDXoDIiYFNE4vS8fLUruvJxlHznf3tapVM/hfFY5ervnqTUvMKd/hPy8xNvy

nd0bKpxy51c8rCOnmZvyv8w0NGxqTmDHnbJIHTKz4n
tSKfx31DRqfAIc2v/1gKGEv+odMjM0PxaArQpl8P6humV++ezrZrFOej1uCvXRk9gNen5MhzFSH+eJw0

qHMjdeOQabpVPE+8jEfbYWttLZl8UhK36sEw/b2ZZkZDuRoutcSqGxD6qygAqbRfjZDRzk4LeKhNccKk

hQofM6c4VLR9qqAeVPUTuG0p07TZ5SE14TxIrErrk8
1fWLHaxgbxsg5DmEdVPGOE38f57pfIkhc75bryiVnNzI8n+xcyUMQEnQO4RAYM9VlPLZb+lr7EBB+1yG

4qX8uCeGCiH77IdDgvRcp0ABkvJf8dmNgOKTNdvKT/WZK0t137Kiw8qVpV+ec94EK/vWKZ3zP+MLQ/R9

fLzCaOtFtmWiSFq0NvWuXX8+caShm83IHd1yR14nLj
zckxVMKbUNsyHGD+r2QDPvv9pKWCaPHwh/ADUenguDnkIJ7sYCGgUQjzU77tc6stwLCFS1pyTTHxWgjo

oFeCcxGKykaEW6oe/07abs0/egflGXXNmqx/WC+WZBRqiO7u6OLYIrEy1tXZTkAOhnFlhd9L+BbhLk4M

V4bTr11jvmSTs7ndHn9sLIV7GCkGqcnuR34yHqo+ac
NTs2Y6Tepef10USFwVlOo0Q3V6yGO7FA0f49muFKogV1O0hMwZFnp99AxL2XeY1+z2ewzbJ+LtA7IIhR

BNJDaxbZhecKCYqjAA4MTK289gscvEmh1nzNqb6csnNIvL9RmibfVwyNX0oE1dz8azFyudxSudBic2Ld

sTQDzb7YK8iyHbZPdKd/w+YqOo6kJ9OYv/skX123J9
sETdcTi++k0JoL1tHVgh5Z+UxFdFL29m0UIODNY1L7QPmAh/XQsI89wft6IH6OYRo8JE1CVVQgMQ5X78

970+WkQww6B69rTnHenFhfl1+KcNN6J+Xus4DaEhWHvN/EH2Wt/4Xd2nYXA5nVbmjoJO6sNEvzKKlt13

hN5G38ewxNkW7di2hblxDxIX5x7TNfVs8GfQFtFx5e
EZKuFO42nRyCONo2Td1BwnphwHsZgR3hsCCB07pBZVoVslPh9CzX4wKBWjV2IvPAu7G4rZnjHPKy2rSS

aGEbAE+an8aeliCZB+sYIrXCwas4uRayVt1gYQSjbtlRtGrNTJQANcZnMwV0AzrEEfLYHcRTkSzBEzgJ

adeW3Mu1D7mFBvcOrzPLSzPjKXhjl468pKt2nhfqZ7
H0yWnK7zDzYwDQEupt+jqckAoNHoxaCynPgBSHEPoepM0dpSPAqroRINAr67iEbjqP3+mfjbWaaa42z7

Cm0EyvqRowFh9e1Qto9xm/1QfI3rdUx5dAL+eAkPv7BbaeNZxFpkQ5wwgxPbKVDJYBDm4BahoBFtfog2

GY9OtydVK2gSOHf5mkUaOCYAGauTOC617xlDyF7RXG
aloVKSC0zcyNntcnFBQbFOmVzBbyoMjtDdkMEmh58HIa37eqEWxyi3Q1ulXz9Aa3d8XBGMODg2rCQb2q

4PuuVMDhWZIDOGlMo3pDY3Rcd0gRd/SUNIu6wwFtIITwPcG9yGvBbtyp3qiaUL7tXBD46O0NTBLfKkXL

OWMgP2heoiJwaJ9c6bGWjwWK+HbAaawlrZB8QqokJo
NmbhkTIlyg0gDSwE/6/3ylNk/dhNoBadPcHhepJin8EtgoGOcyr7+PTi2HDhKTeaP46g+NFQ+2CWwCBv

55FW24cZsE4GoZskpu1N4lACxmKbgA3PzU5OIEOyFE7OnJyNY65+P6rQdRr56I7tfQU56idW7DtU3Ba2

/1IjK3eIsEA6dwWxRYWFaw6fbHJSCTXRuZDyE0WVYN
s1JZiRHLB1dgT7WYFKTEX8yDoFnCybaRf3mjc0vd7e58d/73Puh/8fC84OZN8R/+gedn7h0zx/l7yRbm

xmBsZb8A4k44v/ulQYgvmNFL2XwIcQYId3c6rmRInJ8pe2HRYKzMg8l4relAYoW+49myuz4cdtw/Z7Dy

s/DGy0Z/DVa+dE00DkjcAbcVZmsP51BNfOlQFiI4J9
ECJx1xEXK2ko6pXUMss8FQuxT5AX4ryoBqiCZy4hoj0HXrLAlbO+avW5r7y5/OjfdUAtSTDHFzcGu6B9

4y4v+baQ8ac52IDu9Zz75TBUxIaLKE8HgvPwiZuzxQWNNWPiD31Ex/siZBu2c996/z2jGM74w0cRrfk9

eZprERHDFpK6OWvLyvdQxyvyzanhTjP+NzJhWPcM3R
XLk9HQnvIrP2iau1onFg2IZmUBcpJh/y1JOqnRiEQwzUru/EaufiARWkW9yarvgKRzme0+pXSRIBj1BA

eGBxYa+rhah3idsd7DOYWWc2Eui77VRuAWNmTe1Nmk/X727odnBpIWYmmctR6nLDHa7OVor7Q4367DV1

DoWRb2OZ7N2aswD+tmfKK4X5/94CM1wjByLuD/PM8l
M7zwZrsKTKrB2Zto2w3lp718eKt/pGZaJO5GXdfrHzc3srB0MAV9o0SCEUX8xJAX9zMeQQz/GiUNK4Vk

+HgjsgCqpymLu1opPf1J6taj8v16v/IA3awpu8ig7X+iYlnXO3DoGudQyOakBCuzqYrLUEfGUj5M1s20

cQn2aFD2Jsd50XCUL307NHKeGpFp5Z1UDLEvHyBq9G
3/WnSmxGq0Mk7lYuj6C4stCRvq96oS1SCZ9o/tCJOAqf5s/a/3c/DTv+9vCPH5A6nh/2//9oXoaCu2Yf

/f3sXSS/+IGWwjuZg36UwQQrKsogDr5NLS/M/0EdgwX3U7pNeBsWXBp0cr1Oas56FPLPTCmzQgYCdff5

PyX45IyDyVFyqQZB3ZpFRR5nfunKkPr+u1L1vHCBWH
NDthj5/CvLRRj0ylYnRkeJmU2f4XL1bXwraJhVtSDtwJEuYK+93S8ZNqopT1Yhm62xI+En/K8Gl8X/VM

qKcd1CO/DAWb1TZBY4tmJfqmAwQkzu0BPVYKDFt9znnpcwBflJy3yvuZj4ttG9MfMnDyPWZQFomp0Pha

LVejbJzmIkZpU3dLl0KCfPFlUXon1NGLR8wpq4j3lt
BHjpunY7XRHpEA6iAd3R4RmWID22DhoHBlEJ3ZhxS8lvZn7oie24l0y4ZnG9uc6yK5vRjmqp0RqzecG0

09w1bw4tVFd5h4DeF9lVxQQe1R7Jv4CEw26c35TuqfhFKouJ5DSwOy/5cTJUkEBg0C3ebXjm/Ep76SDT

07EAc6dwgNmQA08jlnVQYVcFPbQcuaZZnMQtoKKeXE
3KZPqTU0z6sjxBECtSMNLbYkfSm5uyiyaWj3RTp5jPmdi4FjBnclp0zcclFrDEzOnPRuwLi1vjMHdxg6

6pbi2jsrw7V65TdWOVgCc49f5MZGURkY9TfeAxQfE5PJTVmumXCdvliiTvP+fHwASIMWIXwEQdoMcXJ4

T/XZAQC/DZwVDjDVfNblm5Hqv4AGiFEUW9IcD921rD
GFftnKuACu79VgPoYPsf8xqD2QED7uPi/hHLWjrObJtyl+0HYA8lIH1wGQ5HV/rDtL35KEaviVNma9Ra

eCJtnFVKSq6a/mUJZMk8Hr98+qo1MB2OrpU+h12D+BIqsKZpO8H/ueF6OvXoDgOtuh6GRaNby58RRnoZ

Sq5OvTz2OAhTO+L9KZkD5a99YVGnmtAinoEdpb3c4H
cWB2+CFcAohcPpVw5giscMS+QxV+TV2dNnau87Z6Xw2PBeQlJZyP8JC8aszynYTVcz5faFbZi+dsWFmu

5Ca66U5qe0r35rg5abBHtzUx/4Z33/MpMeVETSXolJwNHgBYlrF+7ARul6HikgBUrYDo43DPvyG8CeXm

4QL5nkHo/9mF6iyBkW9vQfFR+4aeIfwfRpgSnBtl54
sSogesiQJXpVkkKeqeyHvMpA8JA4qQ/byCP1/skjRt5ILR8un2997aspEZIRR/xKpeWU0u2VlDww4EhB

UFnJRS3o9iascybypwrZzIUi0t0JdjP5QEmd+8sj+GT5mwdCGoBlOI3Mt0Bx7amBSv+/2neAhW3CXP1j

aLsTPnJymkERLOLNHjb/h4LqOZyvdrx1JCJrLkWjjj
H+euS+BwZiqRrSYD2YcYNpZlcB/hDIDfKCzcuRgSJG39cH5eE6om9mhKJV+6an9G3BA306vjQ418HgiG

EiR5u5M3UtnDuccr8MLAdmZEeSr7f7VOp62coBReVawJ4YOWCHdfAV61FZJ8qFdjo/gBS3Ul3tyDlk/G

87S7fne85nMhlhK01L42CUktW9fvzaBgSfxweNdgtk
1XWHR/rabJqV+PYW7Lg1AHSLpcIxBJ3uBjiRpCpky45j9MiytM0eUAi8wWWDsptQ39E/dnt9j0HMCTVB

n5z3NuVqv+zuTM/sKEk4qwWrXKhtSp1626wF8ye2knPCg2I90X308kJjja/OArZo15WzhOf8sQ6V+t+M

cctZCnVRhz+TaHKdCq+5mZjYGHM7GanWa4Gpb6PvIK
Q4wuSdNq03cS+Exf6pnjyND0gku9vkQbqc71BCeRdK10B8DeplVZY1Ey2Lr07zFfyPWqq1Un17do5q9R

cLo6B/c/wxmVDb5VwL2hyiO/+eK37ZbwVanMee6DfZyFG5Gg6viX/eeVXEmlcXp4DmVgGl7dBkd7i8wa

Xj4Avn+xhKiY3eMPHnzr0gF+bLfyDq3+y3eRx9kck/
uub/JJ5m6aDVNnwDmH84I7twrc5vPvO4sORTu6cHSKWb5F4hhtnKYnY6xBJduOX9CjSV85ITwmb1UaFS

/b6cCS8HZjBEeIzJs6oPjNSxyhD4TYX7GR/1XKgkgtFxELhJ2yxPtkiDaKaCbL1c3yJvqPfSCPhnF6ZQ

1GxTU3zwFeK77OlRe5OdIoyeKocg6bzOEgfSSXmVXB
um0fdShGeO9ylOzDpFeRMeKlC6IyWsRvW1PWlFfLK3LWqVgHpAOjeRiirqyL3c3cdZvEkplLf4t2DvYu

zMv2Nxfl4UtDKNner9BQcMsr7U02leIZMz65z+0plTSznmwcp2JV3cTcyX9SJSKRh4gw0NXAkhxTpcMh

/pHibUJAXeg+7mC7DVAaFEELTim/2A2IiJtez11Ov2
ECaLZwOK/EowT8tnEJ3pj6BoOd93GGbNkHFNfF2cs4yZpR9JgjCHcQwlBrkLfOg9fsUitkY/vLydUttk

i3dy93Ir1VtPI79ez6pp1Fs8PueoqrDsY9jJ3qvmhP9ZzS9XjOZY5LRiTyFz6/CEuhA5fkbmEK2B0HtD

KX1Tn58npMV2xaYfbyvjI/dzuwk49lfUe0OvPSTeeH
mBnDu0m8sgZ/pYZfKXQLDDWvUUhgbmixUAvc3iftcaaeZB/jXpdD1fCM+l1vLfvPDYy1OSBRgvk2UVMG

cXxHOrrMH3/aBqQo7ErSEMAKwRSBHiWhD3zFUtno/e7rHth7Q11qrInvOUle+NujdZxyeV2JYxVFeiU0

jdwlva5jadoe//wmid1L7+NHaRrMGKC0QvWNIQN2fM
F3EVVdm+0iOS/7HI3LI/bamj7M/C8gXMh2f81JZbL3FPoTF6jlOF8WylyO2ZFwTAzBdMFOF+ojPX8y0W

djcGkQVGONEk0byvtQjonjdmKChnqPAzRvG+ZKt9+iCu4iWXlBHrOqbcR3B7YtRVjcJ5XDEJ5YJRc0hL

eZQC6t1pvxo/I8sIdtsiXcJvh0BzMWj58T8sOwRhe2
scrfQab01iBbI7lal/CiNszTv0f5gOOMkD+1HG8W3fD0HIl2nOTipt5qsF2PBcV5xBUwDHTq9v+/AHgS

xr90q6ow/4ByJnXhSifchJgCianG0ntKZ/YLbP+giGEZB4qE/+HZSBzl7aRvb5ARcWTaBjaenWOvvfsF

t5iHc1LZ0gAv2M3LxdQ+2VgxwwCwJ6AJVMXhXjqZCn
ffoYyZLy1A/EKoV2qxpKH7K3kF4nzwA9QBP87tFzxLEIjNopqFeY/jAPEMPQmnFh8kfcPyfntcHB5C7O

7xeBbx4VAQl7EzvQKkYNDBiaFDGjSMxFtDjxuHLrZaGvJ5f14opJwjQDdxBINDXvlXtJ/PSx9DG6tlCx

4lJ2Sx/b9bj54ufrRx6oyCzoMbhAuWCTRWTdHcbpRB
rC6S8YLyy/+QqEm7P8R3XRVILjbkTF/g0iCfzAKKe8AaMkUKmUul/UmkwXIi+KWm55pdPNbMMmHAQv3e

/L2GPx4UD/okL/kJXmXbtaoZl21jYZXugc2HJ9Iq6213LobkPxPkpp4Z8HkB+JIPsXY7muE8aa/qPkIb

RfRad6R7O33xryMz/7JxC5XrQ+wK7mnqNgm8VbmpmW
/jrVHpDDdR468oE1wVUB4eAPaq0FRxcEuELeN9IUhrA9Y0d7yR4MJYw9AjcphsiAeB3fwBbH2oXnt2lm

7hOOKDhuDULyY7Vwtoefcob32pa3ubitKcQIivi92WHFZ4LNn3uz2VbfCz71OQ36tA1aCa4OGw1dK7FF

goTU1xyeUcetALpqGEjte0ccrnitve9EPoSGo2d7J+
xXKHTsMUjCnaQiMiqT4iQxFK+iJgMhoUO9Ygl4FgEKzaKn4HpH5M6sO0wN+qu9rvLuLboyxjNF3Zdc7K

ebT1R/o3KIxP9kMPkHOYaECb8OoDNfQpdWM4+yais4AEr1MKSf0O2Kq40Hp9BHLhXjl3B4xtPc11efBF

4oW9cS8fs9XS6m4NF9mWf7Wep4GMbdAm8wSlNzUdJZ
bsbMCrbdrrc9cOKkNt4zc2cr77i8q8TCH9jTS+d5CmHB9mVPYGNV6eWIKs/Kr+hmwyAeUeWNm3nc/7+O

ZZ7GxT1KlqX7YXwDnSmP1alKmn2m8m2LY3dDEobfeq5WjULJwgYulHiUWWLxG0SvWUg5bECJDIYSnW78

oyqadMQ42uCSQ5LEXDYtG+sqMx3ISzmtHT1lmfNI+1
Caitlyn+jtr45DKFlM0eGIBpHy3xQEbisR8BC8XByYstigfXTNJRQMjpHOvu3/Q0YDU5zKJl93t71UYeJYbB

Ds5gNCy0mzqlxLQehkz486SSalv0bdz6nLyNqk8s7yJvnidbM9ui43h1OtGxPsI8ovtKhH9162Pk2KDw

JuxpZl5Ea2QXv+zXxVDF1q06i2hAMFwcfmHo8+14Mr
M5AYB2n8X/swOvqWI0oCK8nQN2b8NqAbnAJw240c7PgBawZcvkAt7600sFiAH0ooCOqbuS68//PuOGdK

03Dj6AxtxdaW2Ss9IUKKRa1nksHZS0LrUyim2IziOt1ol9U9msRz5UTkT7AT9sBYpyo4tltP5x94kR5Y

8MdNgsojMT0K584qtKItv7RU5k+Tg72IPiVsNRlGQz
QJyXu+IEIDktKoubiO8r2zMxnc9XjyVGvI55eHAeDoQqyCBVl4ShsPrBz3lXErTvj8ZMuAoXd6y3/Njr

wGb9y71YaJIRdbZvFyqm1FMHEXg1zxZVk1NapdqtHl1KD0uBasacwJI1/MbbZz9lD9Jz4UuNzK45wHKz

fvMI+lv3zd1/4eziSo82n49oczpXatlBK3EfYhVDhE
r2svGPDpFmHo1MGlXAV4+voXNebqjOw9puAUF9EfhGceXuIXhjRkOk78Y1BNZZuU1VsPiiVbpCucb0f8

q7uIdovdPZinu9QenaXDIC79ckmD6QaGOM7CSVgqxZ0v1SKxK488QiIPe+Qx3B4WRR22m8a2Y3+9DbXp

mWzWunBEgCnOSjqjxvqX75sW71GcwDQUA+xtNQb+5u
tv9t3N+uzZdQo5ay3Cg1DA9/2YR1Zah/ggpxj9KkLoH8zv8p2Isdhlt5Mz8P9KDwE3yt1VbH5ol/33bD

ETYRMw9c5ltFUjUxbqpMj6lEQKq+fsS8SP4rEFHHkdfTkti2Dl9rI2s2fGgUWzIfst2/QLKf6CUBRLbH

mZKFFTxGaVTXPUyQ1zJbad8rNCqcOR23qsntLxn89g
Hs3yDXpLhgx00fUt1NQcd1Izx4FgDe4xZxKkRgUQYdXrGsVfXQ2m97cj6zUoR1hA7c87uudJJUY++lId

K8kDEYLAY4O4S5PeVD41FPslHVAhUVRjDtZndmAjfGNxiCL6Fm1oIFeeo/P12HDOSKjGP9YsjOxwWRdh

APa8ovuFklFsjIfMf4hi8zacNU2mXWvaDjlcGsngyi
1iq3iQPH6zq6ZtX/j8PnkyF5xG+oi2S8m0HGY+94gtDs++luEZjfZSnv6GJLybNNX/oFDsqfUYUiuCFJ

jqLs+R9TjhZbErLulmwpLz5KE6cJq2WmUHb1juZXwqIDkPatT6RsJSLSDTL86WJ0TmdWEPYwOjfP1taT

kzJtSEaxJKmErXIl7A5EQaLHIKHA0ZBTQywZM7IQG5
mImlHLdfY/+yxhsj7cZTvGt5bgiNVEXsRTNDRgconf7LGod02/+I5dqyY3Q7Lcqupad7vhYfJROvTq8N

dNRpZsSfdW/JZfrJLvFFYY5wn9NDW0L1z2qxezM7oNu7e80qhVunOe6AaRD0HmcnkRVrghfB66mjLEGu

G5qmmByLFfXr7P0rygbde52Sh2olfmaQbRiWhiJypu
+Uay/OVo8aPf6idIpjlfPyT1EOB1iyDxa3wQBhqYYh1+p896N01/BfP1GayvzePGXFQqZ2/OZHw8eJ96

+q7GurF2nXoAZnSFk/NcqSOFCO26j2kXXrPzA69tn0om6OWvFObs1O68jecUNrAO70F/5zsfrJ3UK/x9

FaznyZcE4auwx12FI1vrlaQK6E12KdBmZFq1Qpkk4T
iK5XqpAqt0/es0AJ96jFJ9+ozfoRh6NT85Yq/2pZFHaiizmyAF2lDGJMS0lfrPofG6QAdN+defitgyYS

kOOMhcUvHgkXrJ4r8iarH+0/skTu+dGVrq34fiyaFexvC+ExoI7oTFidPvqU6b2buc1sRtN+iDmGaM0S

j0DW4rclFKY64ZVoYhYwNPn0hUytfB4Hh1eeQMwQCS
N6kgI9FA60sfgz63WXSNevVcP/evjnUODTFjYjyYoySf4CRHawFQmE/R+HLXVaBybJOgiSiGSiueir1V

/Z+UsW4EaIobTgxCRjJ6veyhkfr3GHD1ExD3FsAlZftOgjnrOD/Xsja5BHTiC2VEEFScSKWrJ44Nsd1S

i2lZcx/XPKMFG1xQJFa8OrNFeCeHv8flyliXJd3ezW
R30cuZHZv+1asal3KL2YJJwL27tZe8klemvoUTEFz4cjTqh6ewNDjehmab/gK9rvMuv8/ae6YyYK14cE

RfZdpFrEC7ixPYuMcugCBZB4tTtw+VLR3586kt/U6YtsonpvO2RmtuApEmLn2BLJkRE1wcLj0aSA4aQ6

GoFphA0kdpc9yXL24s9LqsJfF9i+aQWOSC03HIPAqo
AI5/2G6Ul/jWu5bkzekM3ehCDGUk4lFbgI17VmeINyo+PPsbHDbU9FWzT7A8HesO2fiDezIoE5KS1tks

MMV60XyHqtZdRSih0VUiGp+A8Ko9Q/znGfGMioCX8a80hRMsngKgReJxtanzbm0dXdE1Pd1jX5Li5Cpz

/rTufyQQLx8R9ycRpU0snLZuhewGyF4xTqjSr6dzx/
l3/lGlew3kj9UGy6ipzfPPkCmYsk/91k8zdQyAf91+2RBN76bUt4MX436sqSP1q9VvFcvJnpoFyURWkK

fzsYD2vMYI3+VLrLMRRCsq9dJy9/rDHLNolxmdUANaG2YaswkH2eaUt15uTtO6bRKEbL276VjoqC/7xy

JMv3FDV7YSLrNl/AB74fayqgH6ku+otOG2sAi6GfHa
Kf4pXYSQRdjzyfC85Igrv+RQTtXfyCTx2Xyvazmgdo/LsFUUFDGMAksuUGjnOcqxQsg37Ruy76dVvQmn

Vej4ya32D19U/0z53cRYtm9DKJjYLKO5DFCuUBr+JZzomp5ea3M5BvmNNb7KWItRFQ8hP/kbScezN5wj

beZDeWqqotFI6V8rcwEYZB9zcPogFHVxlkwteZn261
OCs/98rp8eHiomqmj1CkBlO9uure267Rb046PGLPUkzY4TuTMPi+UeCMI0tajA03uWgDdS0gpyrxzmTF

+Wrb+OeZih2eUt/xWDPd+WBcolHhBPFj82g3os4tuRmoFRlylrGcayUeCLC6WReRe7Vtswzbw7RBRtr1

sRGuydtIMG97LVad2b/l9lnPdez0lZzptdrF4yMw6u
Kpr5eE7TMGtPB4UxCrq2VOQJvDmgzf08KZPsXbTgC20yGznONggPwPh8NpAFx4moOiy4hQ6HVv69JkMB

//ejyBgFMtzDqgrQ9iPOgdhzwsvK7ZKAq9VEogFASFpoIBArMDlws499rkj5XZLs7u3ow4M56LdpakgA

b+eeR1LXq7hkG4GKXOOq9BxwM7HxS9qnhHRwg5mNy7
ET6AFOauwMn9L4yoGgJzEPauC14/cKJLj/199KTHDFMc66Heri0FjtrjlcbaNgy2WO1vvhQbg0u5Lggl

QGEQ3yq690ktWYNc7cNsrW/dRKU0EFnRJ/OUZJ2Tva2KLCHE2kmAMyeDT/ehaGrp/SuqGZt2I1RbN+q1

29NTuYWvtAEEOqC2/f/x/MhURinI1Hkedz5Czl+iZ/
15gK0ymm7qk3gOJQxZwVnmoMKIzkPhlUnE4ye2WDZIK6C1mFMMdZ27zg+kxBFEWam3NuJ2oGX8NMMonc

FqnQ6IJXQR4YrJ0kUVHN/++lMl3/+3oDzbXEUjW3Np4a1COBMfwMzg2dA97+cZRmOdlS2TLI+ymBOqQ7

fprPxUgiaFMFaJ1Ciq/UafC/vTmndJ/VuXLE8pYFEQ
O9ZHkb48/EsUJ9ydYFQV4+n9jr+wIaH5J5APnAcUCqgjlLGCBVmighh0PQJN5m/BvbmJ47pXLkqeIere

Wxgt08b4k62QaBUWTbXR3yd/d5w6WdXE6AO3YdYgj6pCIXFEis88tWiDgBr1JvtfWQ37ht1SaxZ3C+em

8pNBwVtoEVOEKeYsaL899rxxDDqhsVXvsrQ5hpqPT4
/obHDmMA5z7+rnHgY1344jaeFyux+pNEF/NjozSAU+4/cCkuUw35FySXWFvAPvj1J1Lyb+SkRpq82m7k

PC4MHGwjMQDWa4uP01XA045RkV6mZjT3Pzo1E2Glyl6lqnhrfWqBygPSUckpeU8Bvq/IZ8HCeL8P4SsK

uv0FOazii4XpOsGen7FmuaKPRYSt95XBMMXz/7tFb8
MlVEEOLCtb/wSGAeChcM3O6F5FeQeC6bFOjbOJTLKNY5zGU3ZrJYMd0Sj5frjSId/y/L/FDVRnJwnSwr

yWxiHg89GVvfJehvqkJSGqjCRc0ZZd1EhMbwE+eKG3r52AidsNTJpanLBO3q+z/0NdAne3LhL+7Av1h9

8oAjPiIBCSb5tqFaXLnlYgoP0bxMHB5+2g+bh6Gdgb
ckMnC5m8HkoHzeJYqEE3hBeQj661EHeZvg0KKTdD6PL+ComQhje8Zblqxnlt0Bp4I0hNxxUhQvvuN0Mp

2PvxMMetOUbaUzaXiwKaVYZsY+HiSa531yW5SAScL9VlC9OahkpRdq2McKwU3WtygHh4tG2clr3y8eAr

G4GEPPRkHMdo+Xx6m+nrLirwduqVzoQjArPKzKtwKI
K1QtFLYSMdEu3vacYc/0Ihz5YlGnjjUfkdnJ8HGY0+3A26qujJYjgFT6PTkYLYv+xjfp4rZ2vrAH/ns5

6cYNuxQU+5PdoVeOkoSDbm0Up26H+o9xjdfTpoYxIHkmW89OeY5uPkY401wUWmRMAIEkl7w3ZIiGkyCz

02cGW4tc9y+p6ZFYcB6k95g/FN+egZk76iOxiYhhPM
06s/tucsucYBi0RQOfBoxBK1PatYT/7BuZJkkNf6Pg25jqIxwHWoglbDpLnoy3qLkuhmTkqtEoMtmI1D

s41eu3ciZ/zmFzFlNwZBxGdnKZuCJU76EkCAOFsdC5vWK3AYHlLLMrOmSeVtFqM9+ShQM04KgnuJWQyF

KOAtrTKiCFv2AGaTWn3TjanmYS3I0T/SNwrzJMcTqa
NFemU0b5ni7NvQwuooxTZMgbxHBiIK0CTOSapig5hIUGmbX57TxNKYYsFZ+8vGqtJFxn/houk8z/YSyo

VAFYNR8opjCc0D8C9flXYCT63L3wn1lNUpl8oQd21QhQtfX7uIC0GArIe5ismlSP+cnXtReF0MrBPmUW

LHagw7JYEqm1FDsvE0Ih9a1Q3XhU0hY4R18kUZmvFk
LVlShydt2po0hLE4N5IbizJK9O6W4drR8O/h76gxwiXwLDDkuh8MlCCe6vn1irlMMCmRp0AEOpCH3Aiy

aoKU9Q7Ewz+fn2ivGZBeR9mzHrWM4rSVKFkTf1h18FSs1zeAAMhPZiWoC2zjS1FO+e3r4HoTz6MBdsD+

LnO9myQD95/hT253BWquBy4He3VY0ayjwmiG1tpOpj
PkIsQsy2bgTl/E9nbUNsyIwu7rrTYv9DLlUtbE7o47xnU/Z5TX76pL3Ahi8e3zOBbme7w8MPCe86+C0U

voYz5B4ULux8vvgcYcGBYASy6gE2VJozf6jZy4lVwcG3RjuvRSbos9gsiQgI78d69qVUlV7ZZr+jQ2LZ

9FUFAj4kdHw+5BQhy3ys4tShNuWT0stU25HdEX8Pvy
//9iu1qfFjWdEwl18jtUW6HwBa21JaMiWhm+uACLf7A371s7fZzIsDLGLQfvtRKP7EzX3eATQ63gGgRC

H9YONmu6xKssvp0r8m1N90Uj22fbc9On7JBGe//O4e6jwlkpKRKeY82/IRcqAtH5zX/Y62Bwv7cad/UD

rVfutG6aGmrPw6XZeCYdykTntLWf7NZc1yI3QOgI2U
+uNrj51u/+scn6ucyX2K0RvzYj7lB3cQ9ctEoxBs5GJURz7umvCRHhw1j5vw6GUJEfm8KUC5uMdUkyvU

71Dzn+2tAYVEOjFmWA2JJsFPTuGMPLU9qB6i4S1sXmCODpzfnw0+DgeeN9NcyYW/rzKKX1++ycSADi2o

WqYDINOGtvKg8v/3uu9cFYa6LKwVyToSf1DDc4sRfu
6hqrupPSUMJk9rcb+tehsd+5nObpczjN+idwU3fAjfCbHHPv2HCL4VbqqA9vwkBwbqlaf1iypDm1gTey

VXV/bFlsze8izS2mHqrsWN0Dmjka8CGi29B46b8VKP2qF4IYg/8qnYAb1TdsCPwyWeIX4n6QRugn6Hh5

IguPzvewxmi8oguSqhoLqHTQ9Mng6/vuZyJ6ki6hSk
TUHFtaSG7hlxDEaUB7IqH7FnPmWINroIx9RMnC+owli4guD053gmZPLwT8cG6X79VGIuY0GxYVxui+UZ

NF9pUk5OpsitRlWCLgm7YoS1CT0fYFUP2pz6UMwfRJhlfp5AftTRMQThwSltAuluxurOy7DA+m7UISTI

cUFA6Q4Eez0YYXODoClPDyopKu7QPpvvhTv8eT5GZf
4j6gUr9RojsCbnJ8u4Zp/Xz6n0DNInPIxz8C3EWwf/nAynogfpk5ZUVsKO5tn10wROOKXle7Gz+BdRD7

r5lvws6LOSriaajvy70GGJVBUKRlrG2tzsBvOcVl2I0dJvlhagFFu65emzPHD+OPDRXiBjMS0bUHLKPZ

Og+OS7vgji91ViXNKqT4dbhm1QW2F12MZceCy2jP04
rEtZZT1yDYvENETJI3zJoFhy8N2uWs+oYZEaojIjhT3PPWayf8hmLMl4Wi5fXD/wRJ3YlLq+xHFvdlh7

9uRw7bWI9VwK7ozJoX+By22BQNUxFQFPBESu+aFMFOZLEbSea0nWOAn3PZyB2QBl+bUyVlS/BYzmx8us

L4Kz5p5fq49vL4rrV/bRHvbHwaaZ1OKKUqjwr94RwF
QgA3Tquh4GiPMDDAQRnTOOD+BQ5G0a1R0NFENrV1Ahi5dlbzFjhS4OWJ7UInXzsDRlEgNuwe6A46NwR+

hD74hKHe21UdXxaBMqBxfuunuTm4GKzRoBBucRSD3jZT45KoO/JJ8Zbfyhggn+DGN5U3KyykgbpLmBYh

L3LBDq6lfaiEzDNd82MLaOxQGoXzKSEgmVUEjbM9Pl
Bb5L4va7OYWaS3MJnINUVgLOHmvZzWF4J6uTtkdU/my7DWTrvnqNEadrVpZMkV5PPUQngtgVGHzXvARu

ycsXwKS1tBPpgCc0xWxfyIQQ4cQaM0fANW/WAbfeTqiJ5iHhMobprPrWnWSGmxdHilOqZvqd19nH4mHN

S/VMiEQ1rdw+hRUmEbkbEgdwKpZil8qQ0aSEiNfr3H
INSxNYVIVQbkzCdKbIMKL0gRWtK+Ns172tVmiiOjlRI3cMpUlXsveg5pca2lTyJWxNbPjf3VrdE9yAGO

hF8z3+1j9Z9WpkpFdLKTeV1e2ZSbcPxvl6nnR+fzKJJPED3x8WmnelmvNLNidD5Xx58bZxsWwbtClVIp

6SNtGRtcESHfMG9wKp5exw2rddECZ7XzCT4CEi5DuI
ptM9iuGakh8hAXh5g56vS+FycgKsF2SryJ+cvzsp+BlwsxtRMzvyAgRwjdi80zUzzkl1vLGNNfErKhd1

mk1VbJXV4/yxGCvM/nswEZNhAQJyNy9vYorb/D4FCbNv7FJ21yjjI2hdw0HFQsM+Rtd4Qanv44VVoEK6

qXAtGCzc0WU8p3SuYbqIQpx/T8tMw5m8N21HIUDukk
LBSO0LRohN1Lm3xe5N/W7vwJBX65ZGvKHqn9/XaliePnFnXipRxGCY+JlkBCLMPnLB484OthVh6RaIwu

IzquAWsZYHJNeSiaSAAs0KtSgEn7wBh1ANRRjIxZUk+a/gzJS8ghbhYVszEVpUko3+71dDW00EbZ4pks

nAHHQCvBsekaBltyjq0kyAg79WgH5rD+AGnEWsxxfs
q1nsdVPoRGtLsJx2RjPEuQwps8gm4m6fmDO8c3BsiKmY3idq9b1uq+KOalDwk0Uw+KqeogLrM4hYY6O1

VOfLQNlbVENLlE2dgcBeZQOhV7UIQKd4418eJxuBe9N2j5dJtSK8abSlAeCGQdec8bojx59wvv68xu6q

cJUrBOtDyy4/AeveoWO1orXuF6JfJZZKdZDuYbxHA/
0x24HDmuG2KhxGbfZfx81LzCQ4AXXophegHrSrWZR7sD5zZMLGTQT0CFQCWRlYbGBBqhc2NGBtYz/tZ9

rFwdFRDSt9sT6qi/amwFvj0tlr00aZ0RKTBtwZJ5Q4IR1lBYalKCIdUuFNrcqcCWuausI/4OgQQAV4EO

B0Dz+c9w7wfvXWLyGAiHAuqwbmBAEj1mBckR6SGu9f
jWCOY+s9pMc1lbDSSqIqVKJ8HI2qkCo0eF77w1J8tsCUvAxKxVpC2hY5sYn111sYTAcB2QOxxaB5Z4Q2

bLq/xBNTtzSZYqYeYCnO9HNJldazrR+L/+ORCa1eVcL0bKaT3738r9nBtbaondATBlM3tHq46AlZsROL

S6yf/ykyTULA0yma4to6fjjWd5bLaesZLrR9egIdQi
g5KIEXRYZgkDnLVICQHMIPcVq/7NeJ/ZU1+97GTRi86R7Xw3Dp9w3zGqVgnnnpUJowGD1FVq5SL1lmXL

voczzBnwCuh4HUDiH69KTVr9xJioLXbDP7kwLuQiCVkRUsasazlYIxfn+zAu1axt34NL9ZrZ9+43L9J1

Melissa/JRHJx1qX+g3LP7fKgYlF0/eMBmQImxIoDHsEfW
LcY759Zgm/gU7NhjZPlh1Q0c30hfFN5g7iCbAev1994OAef+s5XZE7ERQVg2hCyiqqpH3OqcJ0/n4AwF

9mbElyv149ODj3gplA4vDeIX1ybSvq5MhaJYGvMmzFCXLYjpXoXtiqckc6N6ULvSj0aFlVLdOtgiuMGz

nwUQqsf9RN2TQRnc1XZ2LaxXGq3nY20EjZcu2/jbca
cSMxVqgeC/04n47bFhAbb+8FH/SUUecg4j3K2TphMXvaUO0VM/ed5AYGYibT6ML3S3xLhLoxgw5EQ3ct

OyJ1l4ApC+Fab4+OLh2tdevnJDXo7oJSUE7s5N9m6rf3A90+IUlzj4pkv5Zh1xQcDb6PUFuVIHz2+pzu

9OngQm0eZpIvdOsOkB7ehwwpp21dfkoz/7xqF24oFd
bg/xLg1jRFQkG7S/ZWbHk45WwbkbecbUeZb8Iu5ZSbLHCMxT5tdudzNsngC+LA1FI/wnyNHtj76xAoEd

0BCQtsyMe826JFhZzEc6JilMrNb4gHn4rgQk6SP+19HryUZtS8ihuaM1jhzLcg086c9vALUGTwtzY//n

EQ5LelNLE2VWJE7loNfy+JcbhaoivGod11LT/+k+YE
ZmKo2fzFknExr0kffXm3IyJJuNtQGTTOz4+1jgRSIeXLaA1FzWWDDuUp29oJIirIP7LE/0gIqV8GZ1Ho

/kZFT/feUzQ25+wbFGdhBk9pVC0QmghBhyYjkaIGJve1bHfkTqO0JcBsb6KjVxLrh0qxa0CGkrf2J0Zk

k40eZdb0DYhfLPJgGGLXl8aXAFY0vV3L09b20QdFwd
zcrmOVla8uWMSoIeFOO5rGKSVqgJ8oqC669kteqUBal0w2+46oi/sOugZj/H4BNZvj1DbqNjqFFrceL1

Zitk/9y/gUz9yzDSWxU5n+i6e+BC/0/GLWZ6Vdwvsf3zHVZJktHYyVmvoLz0JEcQTb3nvK0ig3Z3k9Vb

S5AcnqslTtNRJMpshMrjnqALQDZmZqxSr1/lVOpHBl
iXpK493VG8Hvm0ocnxapYVd/bktBPci6/MDg/mxYBjGAwnK7SEqa6fTAdV9A7V9kqaJHNdtrry7jpfzN

ghvylAKHal4l6DXgTNqAHkybBpeEtbPFPlaaHYOgOtYka5OLTaPTj2YX506RSWqQj4hmRy4qMf9JL/ij

EebzTOaAIvx2QciUnJxP9LsuSiivzm6y0ellFRcZA4
x1mbc//cMMX2lX7EhgT/LCG13aHvm88YIA3iv12oMQmGt3tsioqv2sSn79MfeLbhZmtv3RgT15vRJ04e

KmRI+RxEk/v82DG5bk6lECF/xlscKVuUrnIY2np/mjq/GDFNLoa19UA+Cfz9S/pk78UCAMW+FWnpfJth

7pzXnXAEZuAajLiiKcMzJAepIyq8OrJ5Q2LxDzav2M
n37YCKY6AhLN1x06P5+TNhssRkCQ96zXKWQMqa3xLZpmpj7rl3vCGuHG6es3uY5zCPFS7Z4TI+24r/Y+

N3vOEnIlSe+7KCC8TSxoxiz9wcs1xMNmRGUEy6zcSKTpyGB1uHIH0gMT8av0gl9BpAzKlxpTl+yZLmSD

4lk4c3ng6aluVifMOJ6J8hIL+J57BgwxZjR8h3756Q
0Zx4hrSTaKTkjSrbqg28tTDkGZttBlQiey4xa6Sl+0lcFJ+idjdgP6autQ51irx/N4S9kZAlhZMkcUvd

2eqt/f6mY9626C/HpzXjnNrVFUE4KECmtCU4aylCHmQIr//5V0V//KhbAQKCzlY+XvbLCGVZBEjxaaKh

DVnpTx6g7YlBnKNi+VnNPiThRgl4AdMQ3gv1i2JinJ
hGLVuumZwVCayaK5cUntUZEZAL0X7bJYMEYgzVs3x/KqOjaUd/kaKbyqhZzllPQQsvbnOfjrPzjyl67h

GaHGY7/lzWgbvZCzWiU6lfagCJopNqMeFbmEP3fvZgjcgfipkbfZl+OFiNLDpjrJ3b+Qj2GIhFrNq78m

eI8OCKMaHBqPP+G4l2rbXnDO19C3tC1PkiDCpLzNzu
yyvxbv6VcQeQ1NWf4bAyhqwFCg+ujBsO2QWLYCfiAeqhg73KzptlcslYnC97fmnedSfBBuMz8p+I33qg

w19k3kw4WDGOxDjWStWE4Vz5iAnH3WuX8yM0d8/K269itThvy4p5iQ4ku64+aQIwaKG6/3ZnIqXG9QvG

W8gLazUpG5gUqnNxTZ+CoDju3RLt6zE/+KRaOwtjp+
1ZOetylHe0wNdwb7zw5GnKcvtsmAvpgmjZNQ2TLfBdV2EnvQ9mzIBj6rNUhxcnXY1dbqaa/J2rZ/6h3g

XmblFbU+1nzeoCcPE5j+PtZ+bCvaoS6uYBtiPbtVuLLekOIS4Y4I+RH0+skgRCfs2KJauhTCKfS9YLmi

NF1xdehtaB6RqbnFoo9xfO6ABDE+Rczxc6fD6U4N/A
2JXS2U897NtYLP+eylfxfirCY2Y5fXyvBtWHTnMcm03A/+aTN1/flIZHhW2hHIRlHc3FsMAvcBl655TH

UZluL3fXnXixSQn2jOriLE/mNsP076BdX3ikq4A1svqynCQ/+wWV6e3CYemAF4EOoqQvHLBSKEitgy1X

Rntm54PVK6C/4ya8+shzvG+ARwyGlRmTAH31nZn+A9
AMx3u8/Pwx2VJUvHsBJkXmN+uPnjGvQHXOl1qhjZts5blLvyaVB1k3/umWQZZ2/zsuSCFQSODXqzn5Ps

mZFdx4rRbPugnQMaKQr7aiNummlenfXOpX24kTBBwK+RekA9w58aRpk8WZrIuC2RSw+7gSP79bPZ1n05

oa9OrIdr9iZdGbK3C9J1jWeK/a4jUkJaQSW8r1CLBd
YLPTUOB53Ca+RIMturuQtYFORl4zK+fEiI4HWyXuMh/LGu3tSmOv47FxDWw+nviDi5OiEnqQGodQukRB

NYNBIYti7O20JmKC+qPqNnzmEp9YwRM6Cwme2Kutc8mGW4N2WJh2xLoJs+Xvd0OlExZpFhT9lXJFWOVf

eORJ4xYmGPtPyNawMafI/6zq1+gffVeIWYTMVnSrVc
oxlzu3G1Gb7+rHdd5K57vZao+uPQpIRhiolafTmg5P24aXtNpErpVEBp96ZbE14KkMYxmhFhKkVXBUic

qeAkg8VyeostmP6qxVBnayC711OXSCCQlYS759BZ2Kc0VPAk0lTIjBj00p95jTG0OcPBIw5y1nh0ZCSD

83ny+NYbHrOCMzp9/itIjJHTLSxTsixzUdzIXqvR2u
1gyBbTOIGNOhscraw+DLTtVgodAAG/WAeQwgO32AaVeWTBrLtnJpCnZbiynvnKenu47jZ4JigKquTaXz

FnubXNCr/w389LeKKVThDV5ACuQBDNIpfLsZ814ZP71nn1kniGgng+nnTTfy+Y6LkGyoUHr1Rai9BnA5

9CknbeIZyjEm2EFe0Yi4Ngavtkp0zODPLNBTfqyRJC
h3hxu1rk5MKgDxImNrCTUcII/wzxi3kMzWhQGMJiR7EJ9G33qP1oNA/cj9OXc6vQf4jeoHWq95/Xdw+A

TPETWlTXziNcmmbdRvVrc+W7MQlrRxke5u3ljiKu77ne5KM8PaswOIA+fYr22D/FFG6sKc4aWk5YHSxK

2JVs86p1dA/+ypOIOHkTrGhtJHEDtMhLknP+se+jo0
YuEl8KpIXg4emO0b3Uw/o+SXQTfWXGMM/t4sdUBvQNoYyklafiaQotbEBdxAqYJgx8iEcGabpUWcyoCV

j4+dqjPu5ZOTh2mbRS0AHHvCvOour7q5+Wi8EyV2UyCVKqvUma+AkJARZfnmpXGtAiWeMgp9lSUFmZkI

JMYy83hgIMPRo1nEIZm7KAuPr6184RrMU9sdLUsnML
gNpraSfemy4O2qxul/4+5IHMC1boDYVQWdRVsi0v99K7M+GKD0E2Zolek0vAnSGSfg5iFhM4o6kFPzrM

MvnsgXMi9F3ZjHu7sCjGWyzPCC2IKDeHeE1qNfU9Hd9CQre88zqjxWdOjLw3QpJjV3sqMiwND6RwxETG

bar0XkYXmtsjR9i/qexJZrpUA2zToVK97P9cH7tnev
wcAs6MQClfATN6pjZ8NqgmXewT4SGD0XFZrPnScjk1Sxj99m3/oXxMf/PULfzFOKjJmsMH3oXGVRM7bQ

vfEoSgfH3fl8LsNTZiXAW2v8N/BnWb/1xvIeH27KUOIs/Fp8v+b1JHYj4GXo8QP/o5+d359zF3irdYdc

1XFjYVLdyYeuTYld4ERvajpypTjnmGzJSshQHQ/1q3
8vK4kQyvuqWESVSnQS6xHyFFtAbeJri/Ip+yuCAI/6//VLQTZOIke56foIXF+vvfiEQOw3vQbFLyMgtZ

//QSrtn4Q0S/inNcRpridVFKialxRrOQ9NaXSJpytDARHzQAfEBiKc+EuAnixgu7D/d/1S2xX3+UpcQq

E+EAM8oSCSNxf/5ClyZUqMcbKYIf1RIQCpeXAKqKif
7H2uOxBajdT4yFF/euOY1+dDJQ522+vw89GCjKGbuzRfOfllTImoeZzGXXUDPXYzhCraXpoRnoK66IgX

Vc1b+I1lIPALs1gkdkzYW2l7pyyqEKEBj/siTeo5MqmT6NuDND+yLQSQv7qi1ZFUC/PYQgAAAtSOLxl4

CFaj3fHsh4WQtQ/ccP7f+hAwDDI+1fsLYg1+mAu5ju
m/qxvtM9OqND3sAhpQopXx5viKl7/tU+zK1xrrF/DQhLUktGRcOnMxRcda5M/7sIgQBDNV8Jq+5VsyEu

q1orEc1YCkCX6wfbXAtxekiA7giOiq8Ei0w3l7aJpYj9zksP/zlAZmb2uTahjqkKSiCXaecE3VIeusT+

rvVYRNalmh03Zjdyq97myDJ7T/a0/B1qDqsnog+EK4
S62dvFy7GtICZt/9En6c8+yJqrtTQdydBRuqJaQSOGuQ95MsHvXM4vUhhSVaRWSHxuJzpLSDpkr3//9e

VjKOxiGYm69lQKHoUBxNhBw3Q+K0ESOckvjHND6NrPHha8gMAj3q1PmzkYzdFTDdefX8dMrllI/0Nr8j

LPDFSBz5eauXyno9sl2DuqhC9oNtCyvvm1mejXD7Ul
qcduUzwdn87TSs/XfT2F9LKqMhJdtCEJl4f6vQMq9oxr8HbtSDyZjhM8gknD2eSTiJMuHnTj/phJ65oF

0pkekSSr4zgmY4EslGm49nLQUM/C9/mrQUpNXGZL0cwwGdByCsJLszRUKU/gJW6FDVcSN8I6g5nZw9BD

bCy1WE5W2WjTGCogdtgLAgN9xLz/rE7+TVu/wSt1nJ
4aEQNosRsHbJYwmyWVSY5VK6lLMupeU62zcyEUW9/G9UcXa+yPOywfURfkHhzQs3DlVe+qIqRspx4i8P

+Wxv/e7dxNwJ89VCPg9+wW2Q5u+RPzA52Kw0pkpzJW3e4KH2f5taFNkYYF4DK4kiurTkbOK/Zn4N1Z6Q

jF/7YYouYd6cOKw5NZS4AygB31c8tBc3ywuN3xpHAH
sMPDr6lN6bmIdujj/N2Lagje8UliTDUsl4/d0lJCI1NyWYddfFugCSePusora9/vo447LcpaqkR6LDfd

ZhA3ZC/78k3LPA9s5JzOUzHDYMgsNqc6AUqegTMJsqDfT5HSmxvnDZ+SNK6vjfnMJGOYwrsb9dhFaGwX

RpB3tekPoKsz1g7tIrdJwjiqrMbXCKgdxnrPLxPszi
xicAmp9nsZGYiFfnUou8/kuxgC7oNN0KUSjdRXTr3LQ2kduUSqwTzQC9XqkPat6WqkKTJ10roUkC08oj

lHwfVeGEnSRYIIyK/nf1wyWdj7TfRlXzopOgqh2p97FOyr8I7b2KgWbCsXFSqw5P//ql5D4KblTcKO6T

A7nMzMuWwtNKL6PqOF1CmqS+zQzLicZXqj8im0+QvH
wb/vEj7MNUcJLf+aAyISy1Rv91AIokmlCW1/6ZEX+TzgkDTIYXFwK5zPaZsLRPJVDQf4V6TgpD9KSmAS

frQpaXF0Y6sHcqMpWqjjB0k3uDaTpnOGk88Rzs+rEh8w5ifcEtmUz6VQwvxWDY1ZVsCI7R+t8DSiZvFr

oI5R4/2EDvvQBqPyvyj//3lAlflRydJukRDLB2jtba
UG5o7x90Rxcj1FKE/0ifuOq6hxIZyi0qSi9J8vx4w1LgP+JqB2qwElyuMkD9PyiGuINTfXiqiAXYcN+Q

f24Thl62dIAF+B5wHxFGEL+i7t61UaF7S1M7V4beNMHw4q1APwD6n05BAtGapGWSJtmc76eUUDrI//rx

UloZXLSuwgxUjXUOOkX/0p4yvAlCTaO3nvjHK0MQJT
YP+iiufJQ5PKjaUnyFoVn9WEsMNzZoHxkO/HpZJ142329+PnHOdg4uy9gVNrGD1ilSZbKvfmupUupcor

kd45LAqgZ/p8jSTHBXsWSomdiSW83quqSV/YV8bBlO4BhHBQcfWMtBFEfYjH37/rgpv6QVkw5pMMXZI0

F20pU06HkTP7U4kEl01J9VFGWjbwt47LGs49i8s/UJ
tsWuz7SecCeIbS5k0+p4Inlvco9R048LOpGmMP76H/E2aE4Zsn0GCRK/963CdLHaWkjABzWdl1Mw/jA4

yuUdbct40sZ+fMV/SE9jLfwUECTfsBt4ArzpPot8VoXQwRYH27Ek0sYLEKTvSJ0PBg9bDawhvcu7+1i3

2NC0SwOT4jB1y7PQOyBY5e7kNPJDqagtt797952agp
KrXjUvz+C6vzi0nUsP6TU0DWdlr/3iAZRxp9OHWyL/vff8yxazuUXIAdUUNXpykfOpgtJFud4yoXWy3s

17ZyY9Y1xrXQoFz9fKKShwa2P3qh1NvSP8SQIG/DWzJufAaO302hjtmB7P5lP8zQ2i83s3WB4HaCi856

HFFr1DUxbGbcysXRhmyl6YQ9bMPmMntXVcDVQSFEsC
/YJb0gOwLkdPHyn3h6ApjY01g3NG517Ec32/kfACnuawR+Ja8dk5xUaNoVGvSQfpPudLU9rMdp/mBrNn

Vig0PhDJQiVpmatXhdRXtwNtW3u/8WcGbm0Cm5mgDyQr6JOa4+SnA589HgDAua4Tj1fjjVNVlHuMCCd8

NQav5jvwWP0R4FOw8fg1a6Z2F9Hc8JE6/T9kZjCYf+
KaQgVhTm2xPT3qi2bt9jQzSK5ArZQskFMva8f98+k6rg0iMUGAk8QcBhLphJi2C2mC2BoFqWz1Ih4WIj

ghSWUY0Wiwkpd09rM4/Lyi4fr1OG2Q6V7cm1Iqyo5E3bDP7SLPVOiomADBcop7aMtRNMEZnqmCdk30cW

juUbBt9r2xD9RAL35LvQl8QWG5HgoPzAu7zcefO8t4
j6Ro2t9emp/NT/eka8JUgdaXlv2TF97EHG1GcCSIQyXtBEyh9zGDb0y9LaaYVW0NBj3aohEikYR1ctZE

Z2jjFYYfvq9jcyRY+dlf393CVfPG2/fY1lfQJp89uJF+stX8Pd18beyJLW5uHqnfISRjREcTKyws0Ywb

QIPfmcoZ4+/S9E5ExmLdEgz99VjsDPhTMlQVCSejbx
PoNXaw5sqieCoBR4Ru61q5G24ORI1odrpnJFZC2BAlKfmq7RLhgTPketWl2e/ACo/5bJazvV908N52Mg

o4Yt9EzTMWmFBznx2n2j91rIecV+evRmFDuUNNiLBp6w5UvrY5ctxwDk30mWHiFTjrU93c3x7fxLddqy

IPjJ9/NOjWX6+vpi851NUrT2kNU2e3dhGC7N4qMujX
QkDFiYLH2PULF5BNdLFNRUOZ6Ck/j4JlPBMzM5p1EwKa71YQd0b2gDRDUiJ+dzpVbMyODJOz5F1yYihK

RXblWfjeDyuxLL7hsjsKckE7Rg7fY+UnOPETnA5Yp5iVlZX6/Sf6LHulqp2FMM1jq66sMisTaRXRb8Ci

pF8T8LBcavUZwIq74dxLpLD0GYuxvDtm4/kCgGeHx1
jl1b1Hw5PNMHBTZLZsdpgy9+Czi1gMBre4eykAJl6IAUBonWWCsxgNhqxvf3eZIk+6afZSREZ7IC9W07

ZIpWVm2RPIt+QAKD2ZzM2xp3WuvJCeo56eZzMha02KQd57Gy7E60gFxlHP3A9noHn8hUc9tRWrFJ6iJI

fhxMQlo1Cylrqc7wVeqqRmjbEIl9W3w+HMUYkQSIgH
/TliYntI4MdnibyFLGjH7mm0bE3RiP3SepNQBg2U/eHY9qIDv3Az+k12Lzn/gXq8Jo6Olum8apsQmPpY

i0bGm6GRsu+G3HiMZA1Q0h1+Ugj2n7NPlh+F0E31PSZLg+twxCP5nImv67iJpzL17eKo9aGLXBL8QOvY

WlB+ETwOySyTSY6B/QYXOCl89uOtLAm9rpibj0Nk34
Txi/BWM/B/EeK2vu936tZSb8tqSPkSMgM5uvhrxlypUHBwwCQkFR1BxDRNIgLmoeGG7pdOxhVsbX9qhC

RyD8RGL67Fch65pD4fnwy5wWb8sZWpbih1cseUgNV/Rf2TK0SjIooERe5Arx4tOFeP+i2546hsCba3GJ

WGSx1SQFshHrVMQ8CrLMwyJTi708sx6Dw8wacHj7HV
iwXjMPye4DcRNUQZlg7/9B4s7jkb5p7eaLLbQqjQRnQLjsnaFUP9QTGMfNo+ZH3UIenxrFLSptXCuHv6

pmSGReFZiWWVPMgm678iQkuougZVA8I+ubi+tbUWloiL68/slZZ+1DVjSXwlhr4D9u+cd5fHOZtvIfKG

GXUD37KqiYi24IdtHJDW3UoYfw+0gNg9iprpDLT+s7
i7xJJqCEP+gzeVQ/c4ORf50wmbsZNKixZfKheMUeCvpwFL6qRZhJ/yvcw21XV7T86FKLLFcKUUMXnA3Y

zJR+GeQdJXYFkmY0zEiixZRO4oQRJrNfhqcL86nWUJurOOKQKghzfdonT5SMGbTbz/KE+stJyfmWYvTc

a8XVmdEdBx5HDW1ba+jTjsLIBj49WldpbN8l/AxCZa
erHONTSvcTnK/+NdM5YBapXf1hPeAX4Ul5/R+WrkciEGzuaEP2zK/513l9aRDZ0JnaTtfaQfH2ULMc8u

3da2nmWYv4z/PVP+EOGlUlhGbYIlcyzIOiLT4SrATHSssGPfP8zdX+xGoE1UmUzguyJL8jOnkosgCEFT

YxoCN+niYBCdc+l6cG9y4IDs4veQtlGH/snYeJ2/Ma
taylNN2K5NA5+GzUcly0eJuhivqX6Vv8uVa3ntpfGz5u70nM5c5URzu/5h1hPkTvD6zOql6JtR23rgT0

lD+TdnqbbLuuWH/m/NyUqyh1hsc8kvZuKoF8HvHyZDVjlqdiykXVFakLBLOikKW8pM8XhkUEWQ9rqmff

PGCME3rriOla0xA63JpZ12PNqeiY1sp+Y0+hz4h094
KEjqnJK4V7XH+UNmIECc15e19wAt/aT1ux93tRu0i6ELdC0shI0jZ6L/BjZfMdgUxkauFHnk40qsHeXR

+IYyBtooSDZp6gDTrH95CfVdCZq9N3ic24m6Rq0S9grPIbLGsmSvYPPjjdaK8x43Oy7gxpSgoD8l8Tge

piwk17MY41BRPltrIoMoPDeGPLGQhBP0c+glePlpOH
LRK8SP0GXgeDbul95Uw4T4TuRcNTYh84U51RPeOuBTytI3Cd0jyoNFM1L7mASpsaDa5nN9JsZnwtfVND

Z5vFnl0is9xI/FO6E7b2dGZGPHfhHiraswwc07P4isyuLwSZtDMTaLK1mf4hwiBHPy0WUve/lWLb6G8E

KMpOQCSwrmltLvd2TaU5QO4FqCR6BuM36ZmuL9L8dK
36svIngtawM24oDcv5A6Gvx2VxaOPL0+ypHPgSsCnsAFl09RXgAbKHKFI/19OmwHi5PZ4ROoABUz1rv3

GA0+Nl+2NfBO6D1phyQXBxtsLeR/8M+rSXGB5B+czJG23Hc2+IuP9tiCuugNjGI0hm/58Qt1fSMryh6p

J9sWAZntouB4ptUQDoqkTb08isZt9GIN5baoxeKkOS
L6YQGYfK6D6hopaF6xU10e2HsqqAo5uceLs6sx/1HJU6MFuLhXsmMiGIVbdJwwbDLEirQLqu7jWkRpTD

8y8P0VnqYBB9k6TjizCdrPGzaUm4U2YTeQrS+seKNbsvqHBSPz0t0gyPXOCYSbDrAvIaGzUNBu9DnAKf

9RAXCYXiFVUiIDbE2CthGjAiKdqL3ZkuiJzL6vTYnH
xU8jHHcREh2rm80Bxx9pzvuUDfOALNYtdNQF2ce38MAlAY6PC0aXb09qOkv5iI11ZjIi7Xno295szrea

tRDlDTcmyGQ1L6M0z3UWehxeLAIcUnHDjqSrWBSbxl6Vt+rneNzyvc5AitJ1iTknd5pS5CTj1JK18W2q

81pr36ZqpdW9C79MjkPO9Cf6LRmV4dbnI8JgyIrpUg
dVGqNqITmdqPsYbBtIxasUzhkUGoh5barcR81q1lTQFp0dJG7DXP1VZnP7IxZ4iw4xA+JrRHFE3TMY7K

K0KB2e+0dwUFoxiJAwunLVKFczyjjO5kFOyOXHUjylh+Fk1GHiWt3uEn9S16ATcCquZi3EFakBIGx01e

S3AQ1UWfa6b2ob9POqKxQxv7BEiId4UC9Zbmk9gAGc
idqrSEG1J+/oeYt/gnWxkKp9lyQffGCVvEBk5byuZqTJihefhdwnAEy1zABD681lcQvI3RWsVQyX6kPV

q1mv0KrtvBou9LlDfozibvVK9OH4a1ba8rbos1G+6YqNcI5mzGWt0aAHWPd6CoqJIYhKvvCBN9z22nPo

zcyAP/qEfuOnnScEXvFQymhXaQZBhTB1If8ewhj0f+
IOWnN1LFZkE2PNLdY61bEyycMGWiiMvMsWVPxqHIcQaaIudKTydJEtZxSwnjcez9d+uEQldtIP4jgAlU

c0ONEudK7P8jKSvRL3ZdmGF7zNAJprLq+g4IgF67Okm+4//UIWFZCXAFS6vitkQkqrXdGZG0hSCTIsrS

8RAUyyeoPn7tY8E4gNZVUiGVQH70b0Owxl3addoVtE
MIhycFvE2FTtETi6nXSZMlGG2PilWjIbowGv5eZrQUkPRwujXaOT3iCvS4EicuJC2OoGaUuBbxP7/L3a

Dp/XVozd8WcqXtVm6VCzOAEAnrjD5byQer3k3WGbUg1n6OURDRrLx1Dkr25+kx36rbTjD1LoSdIHh//q

nvWGCtj1PA8FRX96U+fQCq126ZrItC6FB6ql/IRyEa
+9j0lLwKyuLnxY4J7FTdgUcJFC1wKw0BliRYWpDnDBNeZrr2ON3GtSHlwwHBLjnQozibH8by0PRIZm2D

iSUojQTRqR9nGobecwyTKhZev8ZBMekTbaACDwduffDPsZC/sRf/KhHkJ+4gnKEMq77Pb2yuuXfa54cp

2dXPfgzVUH6+cv2FvUpeqXV1aSn+Soaf/Yqj20OExy
dNpr1z054wEH4XmamdkdrAaYCHg2aqobIFoPK0R1ACmTKRczJ5HfndU6up9eMksEClqEiUR24ElwPo8X

xQwliScaRUcCqQgNqPw9Zri2TVn0u2abX0yxkad6ugOu16Lawg0rHQiRitrZ63nYhceN2uLHxTX2sjFy

LCutrsqrEt+bD1wv9Lnlx2ioEBaEzQaZ+oeq8B0iii
kjKjQH/Hnb5HV6DOv7lByu1jgG25AG26wVg7qs2KFSBM6Aoq1Yz+flVnd/BllzEki56MvKjhKw+i23R2

w3l9GJZQL2U814CuA1YV/dQ+AOXganUBZuaMwcpbSTjS7ZCYNeGcll9g2rnpttG7mzy3v9R+6zGUp/1b

dbDx2klOq8oRE5pZS4rbgTBfAKy5IkTGeW8UO9oWaK
ABtaQrYNuWK84wOAfJLjbYrqXg2tPzfnfjQkBfn4yNOfYUf7MnrkqG67v82qdRJ87B5sUEvKYwzKwUV5

S02dzX7IGxMSBefjf46EciC2UmNRRb2NO5PW5Lj/c9h7NQPgnLI4rEt9oAaJYulKKyOGg9zlwGoEV8Jj

J3jCFAVuRR+MDopIsVwwKhuKB/3ez3Zje1hDB5JtFk
Jaenc6XQxx8XTRrtpulow/lQ/xoS0rA/8/XxMzcxSM2jZn4kyY8cVdRpJvzNf/7P1rL/u6c9hmqCuWbu

6lgItniMXH4ZD2qNPniwiuWTvu/jeD0k2k4m74fOzkWmib3TTttE4Oa5hp47IdhD23sWikT6ZDHuap35

s5arKSBxLpv/2540tTfddgXFROOA3ZQ1r4qLhRAQ9j
imzEmnDZIA2Z5/tfpoTrsYO4lhvSioEXJONlOdywvTexW/YtEk76xEMu5DT9Zhy7l1jlqvQX+z/GYB+1

cpQG7t3p0+VdfGqFlIKc2uEXJ/5gdlxF9vhoFKmvUtCz0kvdSZK7v6pjrvXHkRprrcncFWrlKuV1dMt/

gfQ2tMSAKiPOv2OS1SfOpwscG8oBAUC6KYFVIUpRxt
G/D/H8qeHlIIEyjb5oP7GThXXY5xthim8DftPtEFqyF/WSPm/7/+F5jKEYX+cSepSPjndvjJTkNrwugC

C2wisG79MiX/NJwvQ7EIsZz99siT2HQLaqHtF7p6Ch5xBDOnfVonKqpvhpn4qvjAdRQNOhAkL2HcX9XR

7Akt7LcUxxWpD8AKuauZSjco5O5xsm4VFQojNwMgE9
wfNm64evhunseSx47wgOM571U40qVwppgxX1nCS8Hyc/j0WJEebmtUA1Ip34iACwGYWhdV2iJv9eYyzX

ah9em5pI8Pk467gWmvNCN6qQjDxKbwlcSiwAdKm7Bu1YOAkkjVflaOhDJWA5IGAlhVFevRJfX9QPTKZ+

NKd/8wm6RgfaGcrhVKzj40IlKTfzh8GIEzgui4BUzK
kuBz/MZXV4ev6/77PR8hTgpAAv02tW5acegE6a+C9ixmVp+/j7TBuYrywHf9f35EhrelBbe7XzQXN7xU

HzP/KoXRwGdPrDX89E1LgP8NEWJGig0c3uVFhh6cV+di263Cwn7PGRZYUJds1llaq6zRsvb4CHAKWbZ3

jQttu1egrxBaqoOtWu30BLCixDAFv/ag8WZewbCrZf
rEcQnFQ8PzMTVSAG9FU3q4KXLqFgw6f3dzj/Tez3TymdjMpY3tXTakoRQfzsfGyFLdv6owOUU7CtRi7R

tdVBM2alJGa6XA7+Z7VoISZsbhAdO5lYxIglvX+TkS1O54wdFUxpl8TI60ANjxyY758iZdeKLpjx8vle

zGuCsWLiUWLh7ftcJEE1igMPRpKLQmiMyraKtmaoHf
faEzzsmW6H4i4d7GTpHe+nU1oZ3Vai1ZPxpEtv6fH0xVnxOBnT5niJQHInElX51olO8m+cWIDnvcfnBL

aOumYNxEYfN2BoVpmmQmXn/DxrpPmmZZNdGxTchlrd2OMOejmT2vxciqhrqBUzN1hBneFDGRAsAY48nP

ZsJK/tnNqG6STLjkdYvE9p+jO6y1YEYb/xaFKdkxdZ
cLgT7XXqWRexTUO+iTgXtuofBr0BOb4zRbhE7N2zC8UJZpyV81Ve7etZMUT0gdMf2E2HDLs5Gz0nx3Ct

+9XjXyckKD7c1DUC8hpzQ0IbjyUlHlrubxkXYhJlahbL5ZGyv/xUyK8PS0l5oXbjNL+XKmaHoRrgEXUN

l9Q77pwV1vKq3YAhiU7RQM5enyXAoOWjtT0HMwELUk
hXY3sk5PO/wwLuCASBUmDrXktE2aB8jmMpjimPThzbgSQ0+VLafCQw9hEfheiTi6H1+9PQfEuCHZ9+xw

yKGGyfn5wAmUXNpFQCj6lgNGvNriOGZpsCYDx2QudmJasZJ3udbgooYutwx7SJPO6JzDDo1wcpBkEijK

dYSV3YHplcpciiNqpO2jeiQuw+5JTRzcs5hlscx5e8
BYgrO/dP3FQR2124MeSsIvr2OE9ynIIVvvjsYAGnX9PEZ+R3B95N1li2Ur46Ky5tXTBJP+mk2L4qhtz7

ws8XO1EO2m7lp/8NLRTys+K9sf7S3+33SOyBcbcJMl9oALxjs1SJFVsuH4uv0AxBMAYvkecYLOptyDL3

MsMtls9NmkvodyZ59Km1bbXM2hBTRLqCwWUZ0e9T1M
6uOV4iPjls5vdawyGmwB9/mr4GHL7vH1vD9LOnPLKXh4TbmJLoIxVi89BV1UtMyIstP8vCz3GjYEsWF/

qXIflHpysN6GVXL6wA2Atb8pgtZTGls9zQHu18pUrk2qQ5DXgHz9K7Nwi6DCSH/5CZxA4quXxh1c1xUs

v+xqaIyFc1AHqZSewc79UbLr8UAp2j9Xn/2yKJW3DZ
sCaBVaTm37gsbcdX//m2XiGf9JEY/2a+aB0TbieTXCTd5vujN4TB24lvgPrBTbOZEIFGGvTg4AZW/m+3

pte3mNcHD0+a8i9WbmRZzJ6KbmtOPXY6ei2kxLtiYeovaAzdX4dJJGbk0TuU1ugpH1ricr6BRllEl7a2

cdyabPhqoHr82Aol7ndiRDFR/LVGVJaG9f/rrCHiru
165Q5Zx5ZeHkgtoG9DHXUzsYI19eJRqXTxZEmYDrJ2oYHDrL6IBYSdTWS8VmuG31RlhUxrnOme0htbn8

0Pl6w3xU8TC1Uk51K9ui78v+mHryu4RgVkaSDxMymGxyCxCBU7IuS1vbNSUPgRESMiFh77iyg9nW4tf9

AZ0ZYSu9fuBU44jE+/mEfLbroP/v41qkZmCfw6Xf11
k8XM0Ot6Bx/zfqmGoqR3H3EdZ5S+D9/dWKvQCBrntAGFsu+4yGxIWOlqLrexYPjl+YlOcikUrFsO2Mso

7HQOhysWaVPM7fjQB/aXUm6OWGlvwd48tbQsgD/Sfmnd19beqO150nPi5l/AktA4743tDxSGqCYPF9tg

kcm+OgOHBA8cf3Df8NFM6itd/4A8EUInENRqsuHDD6
6tu1lBZ/JuVUSUZaQxRJtdhHX4WTuw9LMQmF1nly6sMB92Ft4tUO75FBUrisiVN+sQ+3q+cbY/jhlX6a

AYw5K0PHwCIuXEsRlUPpo2k7mRFcqm9Fb9OAYaSHkqPMVEE5nZK+/TsHsR32b4LUq8tkFUcypAW2sL6f

gqaGCTgFZ7Y/gY/LkwK838sodoHQdsWevkz/gY83Li
9uKCQ9llv/9usL6NQhGvkOUyNlGe95z/bWdohTZi0ip7zY6l1WCKhxClcAbcAN69uitN/xLGty62a2Ib

PZ0NI5kF+9mYqtKrAf+yKrP3+U7lgOJj2dquNF5OO8T3U4r8wcVMijwyODLO1BE7901fAlCmvJp8NxS0

Kqh38SKbratYP/H05AjwwHuwMhQt6d2LCNjeG1oST8
IHeUW1IRab89PfGttJ0jBaK+FiNbMmRFUutfVJLJoP/6X5sbC9L/jWpzBxuqQssMpb8v1anTVkO5fOMx

R7MHogfwwPm1HCIjTshvNmZ8JcKu9+d4TxdiaXR07Gt8kxFmMCHBFTWrZJaP9+MnlCHuWSsEe7vETUf8

Gf5jcc39/RU/URQkYrN+0lM5uCY/p+KEs/tWpYQu0V
qe+owXzIyJLzBFB+vA0NxYZZjHI0e8H0TncA0ToXH30KBvxe26RCthbgZcZPPZW8blGAden67wBNr2NH

DsuGNts590szjJe5EbeH+NCu7eQVrcwrn+BxkJ8YuiGxDhV+HnPOBXfAowqpFMbqCu32yUdOvd0zNRF2

76rpIu/R7JEGASZp0GK3QY+S2LJes0zlIaTKOy5Cpb
2cTVSK/HrloK3waaFvjxMWmqY7ovf29SGZHUIMpQi1GBwKGhpAIwZzTEkWkBroBYxWGO5WqRS7nR3os4

SiyzAVp/0jDs2IcJpcp/h+DlZuHXxixJiYGzVRdSqVT8sLO6SZIdrFgouTlrNFodlA3V0UwqQLIR5Ifn

sejr38/x1HKdIUEvYj3BZm0JXlU7BfR6UmrlpmGssQ
hU6qiIAQcyG+NMPicHnms2b9DsokOmOepKb98v12ql1I1ofB3UV9WbXinRBsS1uBUqK+OXTxnBwtpBJe

oo9aTYVjvWYRUk4zhz5Z+wzsL4SVU3tsGZehfQ9et2juKFPERdKlh+X/Qn8OVV7lZ/q1+nVpo2iX+OdS

fyENwRna+mKwXijC+wMp6PUeIERV6zUaU1++X9cB51
ptv6rEPVYE8tjzODXKQl1bmiaVToLVWBjaoIKHOEiOzDg+uzy9YrgbX5f81sA7I+uimkghhOGh0n8f2z

XhxMaJbr0fg2361oJVGTUFka+WSyGqIaL+vTdWZLVax1IL5LfFuAAQF7BFC6KIOR8lY9p0ozmdKFKJUP

0end0n3LXsnVOGLP1PQpbtyzdxTNVDIUlVybvTW+b6
3+TUjvGYnm1cUGtb8ulffS8PVOTQMo/zAVIbC4qUf3lzkdsT/eLTdSJ7Uz2UXxazT6e9Cs1Psg436mx/

LqE103syGY6GtFuxrz0VT9n0QFKenzKKD6VvFWtMG62fboJk2krU9RMQF19YBA9tWBP1Km8e0X6bV97v

ppo8v3fUhGlwbuZ07nl8ROfkmZdyh/3EgHwZm3sKNC
cgPyD+rR+KEvMIQc6dDo8sx0CMj4Dwm9RxLNsgp9lvK48sPa5y1Cun66Rqj39eFUetmgznZLyQ+JSXOu

J4mRQEsez+Il1K0mb+KCJKueiny7xaPT93gw1P8l/4zvjgsGhdk2Rm+RL5UVA9bbIBwFUi1qkGvHIHMz

tUUcX8U1Lye/sDqmXqnLIa9xnlQiYKneJPDkX9K4ie
03gSpRzbXdL0mCNJ3az3NtslwTP3dW6x/1u7exJwlxHXLJIAzPr3NhsT9mfjDX036CjBGtSm+kjbnZcA

i86cz6PDv/UFMPYFcBh2i6FoM2JepUUq1x9yMWus5PV/8knmtTMOzAGKOvmiR8ztxnskKAChbIo+Rvw/

PFnjdAUnp2H1M6FbjjSiw1wXZ3E1DcVYTfQIlNUoIe
pkYpMZG7+hC9QYLdu+c/dK95iFszHIt/iZtiKaWVCANtwTkvxu7ttrThmjR1FaMrKyxCr8QHYlZ61QTY

odxMDk0DCuGp5CNxnf8BVwR0NsJ2AvLpoK5LpyuA7IU/P6nJIwo1IHBl9JUTpaYrfrt9+SdD6tnjS9oq

yv7cMdp93cch5OeksZlwS1KWg8oxD8KGecNx79AdGR
WDWEEXVxZntCMLRl5D4Gca++nNQdeNxK+gkzECbC6wwbGU8pCBcgIH6yfO8rn5ue1illSW9vIO3O4LbP

hSvB9i9RzyyZiIN6L3D2FqHe2pwwiorGQ9soZUYsKDqBEeFliQBa+CGSPFRki50YdM+Ta6y9QJr59cdP

I7YHKtaKxCQm80AEbjGC/wuVR/Ha1lTVRHU7oT0VlZ
D1mUa82yLjHbV752Njh0blEaP/Em9Znd+64M+qvu6rtbIceeIa/qFt8tOkf6/x7/ZrMQuvu2jNVxlLQ8

GP/PpYIzbG37H+H7/8Wsq5pQGZ9O7GnL05B9Irab7tncxawDQrX28cc96uspRt2hNJ1yXzvuk37kePbu

UXQAo1lHjrMZMN/nRW3IgeRqPnLERz8QPhsYl86LHV
/KLGU9pJx9W+TfN71bO6AuU1J1nRPo+U6NW5CBtpIRc17+VV3ep3mfEBQJ2QHlXPKkZaiM8rVeoSH7Lt

8PAYo6+SVmwtwX1vy0cr40Hg+WARTAgtrYqdz6h6xa2aUlIHfg6LB/JDxuc1Ow70Oi9PYZCsRJNNCmjq

hUo7l44BupavBWgyN1N7CkhfMEOU2Q8dPkJwXIcCRA
6cx1+D/jAtfICM1CnL4x/4ya0bPWnsATrXI9JgHfuxBHjSjC1NwC5ICe2C+jM4ceJp9I81A1/up4Cmdg

Hko6OPQEpE9ifiw5BRidfF8ASsd5Rk6tSloMM4BD+XEjw+N+kLAl2cw7mbLT1/w+41IkpEgNrgWJE9P4

hk8OSmMjU+kgg7mfwUP2kv4KR15Rdw7Spg3O7wt7qz
w5VJHnD47wdcf5BhhpXrAVHZNptwsZ3AnvJYg7/JvbYAKhj589uV70Pc94w0dFau+h6M8O/wqpfnIwxs

nmhV0+H7OFNBtPIilrvCnviRHcVzvynVsGzgDr6oSrREvNFl7o/UOYNEcaAo56bTq82UPRFZhcmTPMk3

lxgQXbd7xC+OPioiDoPxWMfGB5GrG+DCAeQD4VwkAw
HZjinLtZ7Po/IW12cCgcSSrCoBHa1SEin1o8HP87yihEKLZ+EStrwWNWUgnOFPiGdkqPbhWXhYcEygrA

ExTJl9UsPrBUYvMH8xmr+2uM3XAmxPRClhM/J1rNeZ8l01wvL1Ap6cicIUvdgbjhDk0oAHpYoxngzWmM

feXJd96beKSXfWfNf5BSFHVRhKvdoDiGX6GlNY4Hky
w8d4FsFAYkwpdgnMijMcv6wk7Ly9hUOcjW1tNrCdb/+D12zOvEeCYvhvlvZZ17K6OaJB2rxIAMHVqoEA

FSsQnHDlxdpwK+3jr8W5vD1wCNlt1AbT5Z3TY986ge35ylLczwxJcNPwoyZSJZ+4tBI7iNXVKhr5yB/L

sShKoTH6LLMMHPyrUmJ+KGceJ1RTn1G/nCZVQcmv9f
q+L6wrCTpGe5AzGTK60Jlt6SnRrcyagwMEOAKNDaiPMSwp8833CYW5Wg31QIxruhexF5hoGS2HKMSorN

8KQPNC28pDlLzHV7vZeKTEd/W9+CYyykQmKEuTmy8Z36wThMx8C7Jt1oW1Pe6SPsna1nKvWrcUPt19+J

My1Ne0ahg9phSwtTy1haBvdUXEH/+NhTVPhd5o+GnW
VFWMyGe5xLXXy98lVOTyjgFp1+w+HWQQWxvxPdHtgdLrpP1scETz3mVLNuD52EG/+3/E64YLe+eZeVfY

NRkdCpY4uBeYkhSeb+YcH05+8jujpriCa4Soq2jJwOvO/IBvRU79LI03gSPKNc6IY/4jpjSgzsrzMrKH

+lz/+KcXnPyotLbXQe/8i0sBG/BouR8D6hWP5lQtUu
6pbA7fKbwkg5asK/DreCrm6wRrc0+RZCZeFT4WiCgbi01ILSm7pvYtrUBd7ex4mX1BelTOEbjSlT9rAd

yJW+RrSUs9H6qh4ef5Pc8lvZg8wQt9NoZ/Fq+beShL+l5INAin5y+hBkOsKAtrNQwQlaue1H1BD37LwJ

/bIJ5lvqV9Qahb9YTOfXFXusPbB5FpCdnlLnZAemJN
CkZ5LIZKahhrWLSHpHTKAbdhzvjV1Bh1FDz0y/ztc2qrfVkNw5Jhcca+Bp9nwRWOYeoOL00E5IJ6U7jW

sT1dtoxdSrILgu7DlPoQT+/qL/lmIv9S60vwdgQoWdv3YgZUPC/hH2LcaxYaMCEJi2DZ/RtvxhpIwn23

0FJahh1Iwad5Ug5jENf89p4+xH8w2VFH3BhvMnE30K
gm4ehMe+/nSJ3TXL/bJUfULkyU1jpCOYx+G8VJU+Kb5bBrNgadQD1qRvhGFezaRszmOim/06ahGHG6sS

S/Gy+WOyWNcPEhTfQ/CbPtpE+S3dUQglRQGEoMqwyUG91NchbihqNJa7F60AvLbbvylXb481G4Dq9YC4

de1Z6efDGFBRjKCZSDJLL7d703ntg3W47U7L0v8ZWI
jFJV5mE2c6fe9U6c/SYKQG5PXevI5x66Pz7Zn8mAR0DF00B/Nh//xYRSEb2GN3OgQbDC2Gi45CnRKcel

rMPbAJO6ETo3vCqXT8DaHTI83pqN6cGD487u0ge8ivOEl4mmXwr4KjvLVelskmhsg+e5udbTgUaO87hl

fllStBEngbJmGf+Dep9gnCEbyBN5Nrrlu1zdvb+7Sn
Dcviqm8ikjILPGRJOT9KsfcTuhtOSXGacceixtcNRNTedo9uUkUh9u61SszPHiLoo88EjYP4weCEwUgr

GHdTV3cySH6QDociYtIe1VPrygYevLClPLkxwkP3y0mZ+h82WnlItsvcU9PAmxBYG/+YXs+5q24bYx3c

3CEE2ekkwIL5tGXkW7i0ZBy95kTXDJ37JNvoQSk9K+
f157GjNMTL1/kPbFL2h7GhjtHsFp2mYeG2F7VF4rdiDhM0L6oZJqJCt8fwOVXWhdi1NPv5tHF1d8KxqC

1Xv5ESReJxnj6gvKjd6vPiGlCD0JjJl9H22nluNCrUKj3UVo07DquqKHNaMI8x76DngykqsJq4gxPjIH

SPRmstnse5d2R1o46x69fRPiQCZbA6el9BNLG0ZaDO
yviiCqUnFtGd1u4vKa04SvgROwykPtoWlw6rMn6CqEXHcv/FhwvekNfjcaAoMdcrN4Ahewaqji+nHCnR

ZZodd0Y4lcuy76TmVRVFCfhXhw7Zi14e2q7BmjPoW2jNzeY5aeuagGgruPK6Zop8x/IEvsNM7yKzs8lo

2+mSpvPtimRGyTItEH/bnpJF1TDNTuK7i4yod9Z8LB
N3j3cqfeXiH6BMJUQFcNx9Yj5Jhj/foYkuMryUDYlmk0JEJgP1hRq3RpzcXW+brlf6sgmfZYoeJpqYKm

eFkumhN+ukVmMa/MQIzFX3Ldv9wOV1pP7WVEilaBYarkkYnpypPty2DwEtlNoaFG3tB07wEG8Vo06Zcy

K0zzNAxsf/Zt8WUyyPVlQzApLTE8OWgcLl/Y86/NqP
MTzR+eTfeEmyJ66rORN+3ywisn8ywdJb+G8+2v56yX/JBa0AZEVvAfso/rYjtXBWIyQvE4P8xM4a37Zc

WO6GFFHvE1yrMt4sXbpcesRuiJAqtNSbxRy0YJpxIz2Uqw9zHn9bIKUb1u6QqN/NSLkkhQvqy8iQLB1q

167V28C5V3ZGH/wtvr5iGyLHHW9BpuOkahi7iiCmJS
NQNF+u6+USP0N60AhRJRYWduLGviZaGrKydCS0g+Gki+s3KA23jlax5XFMlgFpOH1SSuzzblh63kmGbJ

jeAH+QQQ2r0HwL5pbN8lYxafgj2N6TDrErA3ykaqQTIAmgeFCptEGkmN6mSh1qATCL5yMrYDuQZ51gLR

2L4U5PnHn0lrrgZjt+/bSOf4JABPSTLxBkDXQY8Xyl
54mb1jk8ZuiYzvmxrh/kojBGrV3bPLv2GthzY7vi7Lvn/CTUZB4zb8nqf+54+uYrbvuAU0vxDP0ZjWrz

JDenoQMtq9pbzC+xip9zEjnPrXIVTK/CAW70ntneztxSWD1hT7ewU62NzxowbLqcP/b6KinLIMyq8FZZ

NIlPru6y0tyZHwtP3/qWHN8FlXM7207v4VPufYo8NY
0FH1zJ7KQUf5dYMah76T+GqGars8IDX1X1HgT6wY83FScAsuzLq8Pf5Wb4zP+l5PVXcWdl8AY6CAN4LU

er+Kx1D6FKXsaanvND3rvksRp94NhEaxLn30WtS5NL94gW620FQ/XyMxFsWz+PoBxRdIPZLFA3vH7dn+

c8BnAJ9zE7FwIiGEz4WPD5WZVMJEy11PcpdyUPyup8
SdIo5ek0WkEJLx5MVPKRCI702CFSci3wBqwHeIh4gW39CuQtjVjujf4H2OttVj1jLEzqBtVLNxcEJ8+t

H3/c/uESR5wJf8Yv+BDm+vXxk97FXMUr0yBoY+QLLEqe+XF7tkeC4f28V1f7XI/D1vmVFxECPYb/bjyr

behny+gOMTtEYw2GHReammRYCov9v+Jx0tALdjsOc4
uKeSFulslMdUQzsMhM2aSNwgbGnDqz4DsSnV7ri2z9fGMkmDGdrbClGKHcIVNliGnT6OoZw1WD1SxAu2

t8p5KKXYIblFvu6iMRAEl5deoKki/FVX9H6L+Jzenb7p/2OmjaH73jbC7J/weEZWDe5O3EJbMxTdrQ5S

rU8kl3TeMlvQAWcFGi7t2D5tQeiEAcyhcQq2m7+sAt
9qdsShxioB6kjzEx90DZdqVvw5VcO/wHsMSC6i3z6kfrLGBbyfBcdm5giGECb25GiHfHc0PRtF9R/BxR

Rqk0OMiEEjhm1gFa7ZPCRPLwyX+Zy2Pyxj+iIulAQQ5A90PPdjajfynyB94jeprleLrD8KDKJrdLmPRi

gVbheczrX/pjRJdIZJbnIruHgquE3HjcdB2VVdubs8
BvzYtHv7WLeUTnZ8k68hK/2jK5p5Fm+g5LlWVMGjqS3S5x3y8w/bi88okOeDfP+Tbalzuq1aasxp49gb

nDhrLGsFsXWyEtr7QUEEP/AMABIsJ58Ofbx5l4pjYeo081YKI/MWLzuQouS2N9XDcHxRi/BxyUKBfYnT

p4lm6kHPi/+pIlBCFdP74pkJe1R+w/2v1R+D29mL7c
YfEF1g/enYKQL0UtvLllhwGuY+M3ITckXzeeHOwvZfdr+pefMLKztore/MHSXNWzTI7OD9uOUprlx7Za

Vdn/3/aq4X/XX0rY0UCpd9hltdm8K+BuLQHK6uCoHNVQsaQyicgfa2bQ1Wq7qaJmnb/5f00pNwz2+7uH

5nioyWMK2NfDQtDiOPfIoc/ZdHq5olFrEI47GPjjP/
eMhtwMPi3aGAwD8ICdaV9/82MYDkbO/Eq9gsEP8BScNAp8tOgvbiQrCh7khl/7k47PjCGKaA/d0YhROV

SJ4EZq6y5YgJznF5v+rlDhFXJ+p/pbm22Jnb2IOmRLtXlQA30IAY3HbhfpUKua7Zjkg00hQnGN5N72/8

I3ptwP/f62mqUeelL7dFBYpQpsOxR9lC51suWfRy9I
RQTY+Sw497079m1gSKl/9uXWq+YSzmfE24MVsFdajdjXQz/7BvzMTzuqOIxvtSwK1SFbSNA5kjJt+FX5

CTKNbUx53AtO4TmxMw8+1pfxbDT4lEf6+vRWmsv1TVkFo4Mvrb5OlfIdXZklnybTTfvS5rRggacUWhfG

zY2I8OlJYe6WqJloR6qtddQRMtxi/gbvUNJ5IVcv2o
AXb3dIptBjkhAjIuyZxEr3eCompkca79wcltUXeAqwD6DkaudFbSKyIx27Rfqy7du3WtFZzsrXuAhRQO

NvIz0sK2P5iSGKvtpu/dhLjdCjIN/pChfm7vh+w74xCzQvlxlKbx9tbkQLwHnSyn1PNup0l17EXhKze/

b02Ur9BV6d8I78cgAjyTVS03X595F0HHHBTiEaUuRM
UQtjKOj8J5oHjBJ/n+GsIJtmDC19aO27+hpLhIbfQTPJvscnO8riF26nWF8sCm25SUH4Hrn8VPBsBucD

ShDZVsP7aveg2ta8vm+wcw1IswA+vDB+4roDGDO4CW1vV6eiOPs5oYGaM//NLnJriAFa0DoUuRAC0694

+0JmCyL9iiII5KltZoydl/WquJP2ByFJH8zO/3AWd0
Fssq3Amre9V8T+b7BlLUDgxOOUn1Nlr5qZdd0+eaiZ77zrz9QEMf2XRZ4R20VHZYN2Pxq2fncK/kh8u5

wpvm2hzmQI1IjFJZzcJ2+ugupquuqmlbQvhs5x52iBqCbVt+QgDlwZp95Ekcrew9+cRlkdtSoU55vagR

Dah29XkK4moLQ11NmM0JK5/vYp/fBlEC0slMHFYVtC
DAtFCfJwxFzxHPmCfB7/q8f/ZVAIYzcrGZFyFrFofnwMkwS+9ZcyBpNEmbVjeiAam4eiHqgHo/T9vF3h

3mJAmMUVJ+8C6Pi6/q+2Lz0Qt0g44kHGTHbcZ2mJ4HUtWcPsR75nOBpY4mAxqCmhi/+tIfP/s7ePONGt

RDcW7mdjuMDTsKIXE7WCKi6q1Tqm1CFUTJWLyKk+yf
LIk1QfvsxXaxrtDJLJktcUN4ZsgFYl/rsQ/GuYSguvrGkaHSN1w+7P/fmrm+RtZGwF5C5RpnI2FMtRzd

XuAceSJUqszzb0ue9QwY0ZVkeVFeScZJW7eQ2UoUlLffeYpe7dmjCT1TmznDAvYesdx47hz7A/qChUln

/o/UVu+5/S149BlANmTvV3mQlql4NFdzTuS01fgGfQ
ZGw7SapDQA5sZ57PCWcbkzON7E3WjrLU2m+Fq00Frwc6Hnx4YAlyEI1IILyzImf20Qxr42WwsDZGw74B

2pazBYwKGhdBx6B5yvHc1qgSBzkvjBuiS0MG1d9gOiP0TmQqMVbD2dGFESbBZqaNLjxxrzyLA5Ctvd5c

Xeh1NbQCQqMNnVO0NV/gHnVNg1OgPSVduSnknFfudy
AjIMrZL/XvFYm4O4y8G7yDLH8mjXR406n8Azyh28v0EHm0H9QM4jvZm8Cu6p5lGUfME9qvbcnLCzEBY+

Y6TZBSfvdkk0Br+JaPqAcse05tvLTejQC689O1uxUmJULagZqsN781Mloq6bkzIZ8fWWUrFZbolWiDTL

bTqspF0G4LAS5aF0ckK5Xck2X4lBQonmKbxlpj/Vla
IDAWfZwYach4w9zuNRB/FvU9AzwWx+Sfb7/lQRnavX/cmc9CkiMVV1az5/yv/bAOz7ClQAYxyjfmyj7h

Pi/knCQZVYHJ8Fh8jitvNSykqMLX/atUaLuq/FkzVSYl2dL9xn/47r5uT7CezzkfXCvnggcuq8zyMdMk

wut2YDGAX8SZYIobt7qN+nyocEdkl8CaCe2aujIGSK
uc/gsRYF8bppBq0Ke85mLwKhF+qmyDNgU1rPw3IzgxL3WZZNpactUuaeaZO1yotoq3U13wcdSn2Sz+Uc

eUhR7bBVttW10Kmg0wB+fxqzPu3giYXLPyDSNrJ7/gOz84v3MQFiJD9/42v8Wpd0AdU22IKeS4nzMjFJ

LTtGgGyc7qm/JVBedV5/aaUeoOlPKah42cKmVKWBr8
lRihbzAvv5AEqAYiS3nkdWy3vq2kTvnTj/r6GhFxzpn9CvUxuwz1NrRkysCdVRWlDc5ocO0+zGQBVlRo

8VwM1QB0wimQhAXxotaU+K0u/zLACyK03bYt4AuPb+AmTnuUkqlUZooRvWq2bnvw4DYymFzwhwg6FIfK

tjaXOHGCuJmeojhx/LwijFSv9RCpYfQFboMO1lADfI
35QKKw28q6r7J+Y4CeaxDgOF3bYit+n7cd4od08hxWFvvaL2mkK/DdD4dXnLK3T9pnAXrS4RrVl83a/I

8AO9aYxa9wrQA4uZjj1z/COMzcy2czOxmm00sKZVB2L9ondJvM+0ikaAIdYxn7vrcw+6DEbZMNc+7anc

5lI676jg53uhugekJb8mpW2pe9GIMZubhDVBwK+GVV
WljsOSNI4hNXhwLX1ERwlsNHmj3r3zdRuVTMoSUN3Lbuu4/utG/1gBmZChWb3wX1ivt1FuBhsbwctIox

b5rDV5/elmj1Uh0q+Geo+RpZ4tQx8bhLRsblAZA2GOj38YkatNhDMwKiGVeyQoEEnOEp5ZH1DaF7N77g

3KFfPhPc50/tKkz4qocOJ9K/HN8U3Fmf2MwyG0jrCS
GkJ3hb7JdsSH91HOIwptWvzTjJ8DB5CeY5IXOR2WHRmP9STLHnfFcDIDDK08FBrsURAHmzepzTsangdo

5q/Tq79hsv592Hq+c2s46xHjVvvXaUaXIh7pdif3n3gxVNoiofB6fitXyjar9OlSsBBI9uPa1XV3m9mo

xS1C5vknBmTtrbu4p7O4aB2nLPveWMkj/+g/WbKtHU
yDN39y+fsK/+OmapwUslUIKB7oJ1OEFNDcEb/Jb6r2+aqFfem1qZ2hk9GgdWQN9a6sUE5u7O3SYNt+Jr

m2yoekO+Jv8Eo9HJ3h87aYbjQ6zVYIPHrev2AgCCkKJMflFZZ8UzswaLnCiqYBzDBczbKLLoRN2Ztt8R

sQX1yzokz3vuAO1/2A+bJCckim+/QGQZIl5gWJTu+O
4zhN5A5QzKfgV5CF6v0Lju+JcdHyBhINVKcF8kjSK+ZAoXaZrOinH5jeM02e9wdze50faKRGouEaqSLw

ahDpJRQoH9Fid2jfUDC3z2hdsgLhwfnRP7JdHvN3pYWhsBuI7Af1FVN6vBlbASV5MeX5/RTXh2ZM5bQd

vbOC/+fKfDA25Wvdv+LPOihG9Yxbx/Q2osQjOdzWiA
vg8zz+pwJgE+MJtxFwaPR7gnxa4aXbspv+91eI1MzP1zFTUmHh21bTbw9zlHvQPomWjh+T8EX5bw0Wic

iwcBTXH3y+xKRdUoDGKC7X09cS5MWtzMVE+PWDA/n77H/0cyT+TJic8JYA5kog3sa1DDi2w3AjdO4460

E7S8ukF5kDh2UkCeu7nuBN5zDfXNIUNGvw5jFplz0i
eKgCBgzbvZ1gGYDhP6SxHTIaCK+eNsSUvOL/XDKmHXKl7Q54Qbgc+I62EnUz6PXBaZEn2h1icCWv/APH

BV3/ryjS7wfvVwcTCeH3j3XmOdH9lhUQC9PZ1i8cNSfSKOQNnwt7QnVmCnrmu28z1yBECoXPBKgYfeSa

ubKAOa3uL1uPPISWA6cRHmw8Cf6umUy2IeZ0Face/y
HgPZqKlxUOOkyn+H6O7UrS+buO7SOrCx/0TZNJc+Ca5t0gt2dS+7vOPC8LD0sex5FtWD5pPzxwws+Eor

TmwRey0nJAZpYTy9zc3BfUqv390o576jspyu1PRP0tv6DuTch8SBno/ZPJ5RBeXWHm4n3jPVkyYuNDTX

VX4dFH+4UcHrQmcOZ0IykNe+i6LBQpWudK6/BhLM8r
UQ6HeU3XDD4MYO3Sb7qW705JL2RTO2tYbwc9ZN4C2Sxf5Do+3/s7koht3oh3ZAU7PQzzbCs3ZQCCC66d

dk0g7EKWWPE3GNt/O8Vd0zZtOy1lCij0RJtAXPRWlxTmICPQW8LhXmRdT4nUEdR1HFv52lGh0SRERErT

SeAAejdzNxn0Vi24TQuR6FTM9I6Bj/SIdEk14wgk0E
7Y/QEpaWlq8A+crT8bIMR506mZRJ+sh1OaQ+4K3HfXUPX57SWGp6EKroXvGR3xVMeFcaxQ2V+eQ6/qWo

lZLwRfTLiNPGVXymFuELXgPy196xrSVJGd3dA0qg1JTc1lCjNcsBBV2leiTuZLAsCLBB7aEWpMspP+Ry

9dvXnodPTO8F1ogRblbZ8WpHdfaLBaa5qVlUPcAjRp
36NVT6NJp0lhTPdq8ixym6vzdhIo+JqR5kosAQd1+Cugfg6dj5sducWHtoFP8EXq9jpIaIPsFHgFe0e1

qrZga8ruenqpWjvc+yMw1IyBcxCuuNxYQfYmllikkXFr84c2uIdajJYkYAsv3wS+uem12lXJhK3GBZfL

3puu+9l68vPp4/tp+fNCOKXDPHq6v15UKwkz7XcLCv
KJcfpVOEEHMJaPVAwY+Og2vDqoVOfQZnPqGr13FDwJPrNlOohK3YI9xIqCQfdhqz/pDfphkPJOqHnzPZ

8JM9CK9ifVxOI469qLfuUdonnmDbLVdDx4PW/3oyjYbDcDvV+oMjk232NLebXjoKWL8vbcx/NidWqft1

iP2dyM1hO1HPbtHYjV5ijz+Nq0adt108azbk8mItAm
ndGta/U/5NSpyTQwpmyLe3qJ9tv6cZvcHsi0ksf/1w9JY1DUoE9gvPcSkBhkpehLhw3BqcPkmV5X1/33

Byi4Yru7RKnXDSJLrd+SUjHPGb9Y6QCILmNZ535URy5eQ2pVOoBjIRWS26Gq9Dnq0tK6QJbkEz5HpD6j

SNR8lXvQ4N72siF78+Z+AcT7IYkwRreVXwng+faKyt
uzUP/K47Qm559d8kfIkAX94o8/dA7E0RkXaSPMT7Z1drmTXT0FIFrXhrJ1IUB96E/q7KN9T4aRN0DzlX

yqLfh+d6bZiPHnmdSJAwQa8l+Fp6H3I+tGjv1xAyanrU6iRVFJrqff0ZD/gSiHetQbbJDb2LHuu1kQ7E

m9Pa60M4WB00zXuAvZrU/M4LXIls6dAlfgKTGeKxFT
ahrFWbkjjpGYLBSLfjI4Pc/9QmkGS/b9eeSPEq5Y5pKGs8BJjRZ2gE6pkDeEqrYu2ZT9FntYkqJZ3mmH

zBRLep1qnzGJOrLKV9xD+h3v2CWbeerizlIn8VL/j/VQix1BnGAfhgxbKt5/oRhElmEw7ecb1CWdPuc1

iLuCt2QrYo6ex6af6I3S4g5glP2k0u4rHPs7uGumHa
TdTuehB2wHf1AzdDD8bjOCtfNzrwRD36XXlJFb/NRyi+9gwtA9jQvyqtURnLMgGjaOdTsMsPF1aOYg9O

X/o7b9JlYMGWdohdoidyI6M/udSeoZ5y5kJrWwfM2gApyDp3CJb7D8sOEmVgGrJhbZXJ3H7N58lmNqZv

bjWlYv6yALm0SlF3NUAkkmjvmVl88BZutgbKLAgHK2
JDnLoJiQsQd3dat2GTmcaGhPPh+TecM1o3WocIb7M0Ke2edsvbjN8woM5oO5Nz+ZK6+bvU9H6EA3YO/F

idMQYJHlCqJQw9Cr9P64KbDJ75jVutS5JKLsCyV1sJ+JpgBG/zSvmtE2CNvvugyAb1GSBvr8tYoHA9p+

4QKKn922XV3Pg5x7lU2uTk45llhdWAEYpdSPfYlAsY
WDpjYebUBM9kV35rLINsRCsM95ij5Od68W01QSwmYVuEuxJwJpxo5VZmMhZ4r9d21mQKFe4SQM5+yMDS

YCxdcfmW/1DZe+QtruZm/yDCL6sojhn5xuklwTLfaryWG/tNWOkk9MNUVYTU4WBRPWBJKIR+j2+PRKYY

7OxgiO0gn1vRm8RCFi5XH1qHFLiUGsGPCqy5vCvHYl
TCh9Gq+6S4v3FkbFhdnLU4jZN40d5/Ky6zkKO2Ht4YcpwRir1orr/cXa8TWqcV4+8QkcEwmi1Z5ldMyv

D4kaYvPGzaO5XXdTS9J5Odlk3fsS8D77k5GERJ08qKUvJZ2qWejXNAuRV6y/r7dyneM0UO/IQTn23fqo

CLc9QWEjNN1W1c52yDxVLXq8d7ul3m/VqU8vyuaMhg
WgJptd9h2KjZA/y1FFrSfSacd+xnOa2ceEEUm5e1rdlxuVEaia/A82KwspAi9UftpQ0YYZi5OezavKUG

fwkzHE9CGNiN4PbhlP1tKQhzVBUBef/6RcuvgPKPRQ2CHKkjmw8p6k8UOD6jzg6/OKAWyubOYjIeWpJw

FFo5Vvvu8fncjOZ+0+r8klLt8z4jImm+lAyZVUc3hU
5vzcoqRNHmiBF2S1POvp3/EHRvzYN9zX7oX6LxIZLiDb1NHWEcGsbzj+X3JMia3BpJm1kJ/NSfV4S1Ie

zOg+xtWLeW6PjgxnjaylQxUlB1AuOPIA/00BMfk7FS+FsEz/U3aHDtuSnqWPHEuPC6+k6xRDVNJcMtqE

0NvvDTFMJSI3EI40gKxh8OcjYwPiQkk+V4dgsjD0Dg
3sveSWeNa7CRxk6iuSkc0bPc6kUj9DeWuHE1yPjnmPYPIoUgmgD0xFNMc1jwZfyYEYpZPBJdkeevJZFR

/H76Xog0ZDOFKwoY00mz4Cc8F5Zlb/YQy4MVsu1Ux0XPeOikRxfvzrhZDpYTPiSUD7tJNXv7/ujmKA8s

CD3gITZ0NC0Y378hdX/ruOCMWvBkdcIKARY4akI4im
R6K1UGu45Yr/cbAx9Zp2uapmHu/Uxm+DtAqoOp+fr9yAU+ZzjqtHEZwDYmFgv6tzugA8XWG2xGmkGAn/

lvDvmLpDascfksh9YHRjiq92Yy1J0bhnlGRd5cvYA3L79ozALoUoPruMZ6/jSVcL5dJ+8f6CtbDlFVeH

DSCIySGZ3E6roM4YkiHJVE8RUBygG/I5qgJ3cSg4e0
UMd17gf1ko3XYXNkn79+P0DzpqR7yvJ4n/oHcMR4F3eKVIudVlZ3sk+zbDKfqjoeGNl/nOgERNLttJd8

ORoPGRtANTjnDKqZMgjN6zvxU67hRFOQI8xlwO+dUxBOsv7RWUeAbr4jQ51o/EnTEtnqPKhrw6SLaByi

+4IPDul4Inha/8ySnOb5/qQf+iP8yVW8AcjbLc+Moore/
2JJdcPjprg5vKydXKXzerFWSAz52WqJT44hK2ruAs3bVNL2r/9ck1VYGzXwadcG6cncgFOijaQUPyqct

OqOsdrH4UZSroLlohn1kqTDa1bsFp/XZltotSXkWK68+RTu12IPEcbn4W/65x2CtB4DjZ00RoeNvNzGR

IZzmgXM4x0pp7qFbuaB+0A+Ts3pgm7QuFg+W9pZcso
oCxoYsptAk9GjwYFkeLIAxo5VYIx75fACj4el/X4H1yoGFTAXkhjh6nzzRDXECqJ6gKXOu3GWS81qRuQ

AAueHqx0ITi16iQK0AUcL84kXdAxAeFlejwZi/rnbxZoRUhVznZco3XQp+2j2xzu7rpO8fmf13XAgPWh

/Q/onjgbAQh1HDeVQ6KwMJQUA0f/uowmAZPgn+ZN4R
TabaB7w8UjJakIJK/hB6ZaBDd7yeOqOyelc363rsgYqC7p8YaM9LuxQo15vvzQMP3z52vnFvcm/P0+yB

RSJD7kJvUk7AAuX+koGN7T9Q/ZXNkBQgjVysO+qwV3wV9kRtNnGQ9ZS5Fnvd2Npb6uGAbkT+rxn36Vst

TixftnM6v+3gOqm6+H3KMFk+U/xd9FJC6uwjZoO3+a
4Qlncra5ldw+QEB+seQqOyGlBnroJxWKpZXjiA6qOatq6tqC6JYBTd5Lcfih1BrcNe/QsEZLkoeDR5P7

vlIvebwHCQkh78TbMuWelJNKE4geI/I28buPhegVWzJhX1WeAAdednBKZ98ncvoBHvGMYvyGVKLzp0kf

4dpi/BBTdzBt48PjBSKk8v67kM9G3F9pSMXvGuyJoS
bqTr33sHQskw0eALKk4Q1YV88J98v6rqoZxQvlK8fFmAap6BpL/wsN3vLvtVlhWCyTiaqpPHKO+fZvHx

H3iWdnNFjAitqhEuMG/VydgFqa0w1pJxhaiAGY1cFDPMaVFV0w4dc/fzkqmOj2bIJOWEKn065e7G6pmA

wAAe9xgmUIBrYyHFUx6uKuzZgsBcxE4TC4DEJFaVuC
9umIEp+Egd5k+VsWVTJ9CzBnaTeiADBZVCDaRFi//0nmSBtGMGpBRWdpRSO0NzBGvl4owOvjX/u5rd+R

5iSlDdjTNSiig0Z3LaJspY/tI6eNXIMOyZjCN780dy512a5/frrcSqY5Ml++rvUM7qJwRhUiTwcrRCCT

kTU9nhBUVmMmfRAayytN9rTXl7MKHCQOIlOKNNTch0
9/zHQXzx5o978X2DS1e0jpsGu/388Ye6/5f8/HG1dGhfIOK28HJNGiNYT8/meGvk9+SZ1i0JTQ1At9gQ

U6eMaOA7vFxv+L8wDl0KhqJuVpqrAu57CutFHIQ+PtBVf1CL7chZGiaCNt8scTfmQae6+igskSaxHvG/

5Yq1yrq3myX8WhUjjszQcp+3wip0YYmJlnjCYHT498
Qva53Hc/pnSVU9d+19oHWmaa6frR+cC0dOUqRxryX3/FDHA9J7jaoqBSsckclTOXGbvsbIquDfUcX4NC

gi8dLrFcD1txjWd9yIGWpsgZRPqDD4rtSNSlJ8ckW9KS2Y7RWD4IwBgcO0/PfypANLvNB0BwG3v4Zy/v

D8xdFAXE1s5pkTueplHBXPq+kKml3ky0XYmhA0DlTF
Xu0ZEMaowG6f9EwrE09b6MworDrQ9qqR8SW3OS3LouWVIRmOQRGPcQI0bmjPA8wQsUdGe3ZyDy70jZS4

/7BOkTjMG3vJn/qYD6ZR/xjNDnlFbln6WweO5+2dg4ezX9V4ZOuLqaBZ4N4hHTr0csHAJ9uuy4skYinG

a5lYARNvjWsZY7AAOG91oHQDnn8k+JzTxacQASXNxg
4Hbx3AtGGRlMZd5JnXBnt2BfqJjRhiC01xYxfZD8fGxvMXwtJQApj7/nLAt8uITPMOGyPTMF3UfTii/i

kWjleVERd2vWm7/3mutcho0d9J3GPPCt+KjmvOLFS1E4Cn2KMo5HaoZ+s5LOEK2EBCjXh/U4RYRyNWU6

5wOiVG9G5RcyBRs6S5SkG5LivDtI2ondETGhKnHPYY
z7vr217Q+WkMeYxaFsZ5+Xi0Md3W/nJgYFAz0AGmtYzK+QimBoleo0NdXSMMA6db2rG0rsZIkVljTvTh

eEdFjlsCeiQ/GA2JuOiACwoSyOp/UcK4CBK3l45HAvDm71jYjG31Q8uJtrxZcJB80MtAKzF7qtMBlAqQ

COeUTyviJwJ/qFL6hcdBuSJcUcU78bAU5oheasEF7c
xZ/z+qWvyqK5JMqZRdjwPXiI8osWQ/P3ph/3rRIvi6CqXLggpyzLv2BB+nLagbPv9Yfq6/ya6htFNBEg

RkE/yAyffDmr04Zr5KMYCkpULGGFUSAiMf1wyfyoe/gcw9OSkD6WeaNmskdRvTkMqVL/cdr1nXc0lUCu

gze+Q1DwZPveBpuNefwfzWcEitCnUG3aBJlFJIIY8Y
62HIlGsrTJdoZOgay+RLruiT3NRyTDQ4X0CGt8EJ9rr+PRDnddpI9Sp5Rhur4aNAm1r0oUkbC1N2rxC0

L5n7rlwpffPBoRVpIsHg5hdL+zjA7PEfgSlRJkORhGUNYvm8YnK2jBghwJZ/2mvqQ747rRK9O4XczlBo

8wHAdzy58tCaYOprvMX468nSB9Xs5MoMZbHEeaCtaK
k7/FJ+ZK4KkvD+bWeIkXCr/VMvR8t7/cRoi2VLcMNHtJzcnh9d/6K+rprYTxULrBKuNhzpGDFxM2KlVj

s46Q3haYFxXc1MhCMLsFIdrOwrFltB6KwnKO8grzwt1l0qzsPzNp3vfNfDlvyoozG+6741enP2eXpyXl

x69q3IaFNaYcAto5rn2gd/f2bb0xdaTixzfO5q/Mmx
2nwEyizCS0sgDKoWi6Rpf1v2Qex51qHQAOS2hkCWB4TQXMjk2uW/7u/qW00YZ/gsIG4e85XGFdafp6XH

hEaViro/Vg2H/NI0+PM1AW5Tpl6ClFX/+Fcsh+qduPk/kFvf8Z16GTNyw1lWwmbhlxx+GCnkgM6YbnWh

DbC8C5wK5tByYKliiQtAOC+ABjs3eDKNJOGgXRjl/0
VjGeKB3Z4MYIwGfqGrx5NMdwexZJ9nVgjfeY8xbgLWcI16bg5YGsrk1dibaYvIRQ6Y1sdS1iWY6u8Ae2

P2mdnW1uCEemZPB69M1F9S/7uXIPeN20W5A51KrhH8ok4v/C1D0rn+CnQRCaXyxbmLdWSGZ37BxJu2d+

CFMAE4CIIIa2jljabBp0Ml6FDUKQ//c0z5x78YTDb3
ZNuvptByL2te3YeUE+g1FKnzj6YOT7H/CG02q69AmliTThJs6DT+vTTuKxb3hGc9YEsYcimPfvsG8jQ2

AGSJsA5bflvNUAfuFnN//Y7SLqv8KFDfjTtqYw7vm37yRdubWQhWKr+o0907lYb+b3CzIq5CoBLZny0f

rGmBydCxCLjZa6zyUYb7mHDSzHHJyQ5tWAunkms4BE
7u1tccTklcp+Sr27EFtYfo9vmw10bn3fJlWm0dnqhjnbfFJrlVINP2CidvptAh0D5LhA05mPf0qf9pC9

2aZsmpbSu0x4qHs7rtikQ1Q9MfZFJ/AXYmiXB+u7eiZcL6kmzesBjWJsgDRzrof+S9HLzhLL3lavIh7O

MzEbeyDm3UJtLwwoQiSyoQI7jdQRcZxqSs5yMN9Z7u
yGvfmsZWO4/UvWLeH4GNUm1uu0ZWShBsaK7Gu3gKVZOkv9zUt/UPJCpHuRjdMmaDpyXu9v9TDpJhN3LG

jvmeVSR7WOlBEb4G2caSql8OXPb2DQ67zC+Ksinh8hWP0/rAYGO87jhUmvqh1kSRNILrBd07hgutnD9r

bMC03u2t69RaTNfnwEs1X5iHGYbeIK0f76ncvg5DXR
fF5ZzQ4zGijD6Vo3tSj7bY/1iJA01Tfr+RVjlVfSGGYWjGbt5lQutiN4e4zlhjGRJ6SMp/wGjCs49b4Z

ZzRAckzeRkACJjzcfYjJg4CQfxagvXePr0PKNZ0cP9dE866KgzrXxsdPDq+BaXQzhfY7cXR0YY8dZK8H

C/U33JXr+aA5j0pDwP6TsJazG7Xqek7SZ8VvxufBk+
2fG3jTbYzuQzxdDB0SzKt2w9tzwUgoOoZ7GfpUJeNMm/I14AV6WobRMlmQuloplBzkGh1byNqPTv8ahG

GdJKwxpX+R79r7UuzXuyERsGtuthH5qcRKmN/Q967stvSTpEFquilekBK7Snep+fwV/1smj5VNgHS5zk

0J4HAPf4ldW5sLoeZZdo+kAYQGgmrYS2gqbBbPPJeo
aPOvMc6S+FoavaO8ExLaZyBe9wXfNY9N0+0NqTyVmI2qDKxItnkvdsDfWSWntCj/vfzw3jbAICvD3dfi

c/4ynJYqPlBtxEy4i7hpmTvelRBGtJvg9AXnw35oqQY22SrEBLWRUeyyk4v8n20XoFSDvsKrm2fTSsX/

qO6kZ/1TL7ZP4TIOBB1C2zPTv+ck76ssB/GcIoH06b
54UKb9hE0kdxtSWmUNrUtBUHfu9v5FjZjOl0Nt/7ZsmknNaO3nxXNuT+BoSsoSZ+xn3DWejhxK5i9dyi

WHYYjdc378rz+PnXAIEIEaA34rET/TGqP6qYuuk+vsHbgGJjhcOq7uxdA3hemV9G4dCsweG0GtFvw4t9

jkiR+pTMga5tgVp4mo8zTMiVOntAMqUWoQLrv/cPR+
jeZWiQZ79tC7XVvH1PjPcsNLTS/ZawHlCgFMuyrq3oOVbpzqcugu3SH4X7vrDGMRTdCoxt1lFo5YbM10

21kg13t9nlRtS6DxAe9SuUL28JHErQE1DZaVdRFvNmeDly0xVAxyEu2z/wLFO+pcN1IQyXxVNPe5zxd6

NsB46dLBlnbl9L7EOmIMcYOs5ZWg0LoA+nMfOQUtM3
4WNZyJLhs+5750qPEW2EvyQDL3eDwD/ThLvD7IhVr66e8aQkAg7noeyNJLVwkhEKTPcV0yqQ5JPBRoz5

vklxGR7igS4oWmwpW9uU58gt+xUGe8GE5lOjASbcFrIQjIlKa0t/V3tQIHRTq8FeVMLERetd5HtTyrw/

J+Lnn7CEsysSuvADYv999ryw0uDSNgr6jscQrROC/a
20994Zg356fHleUGx1lbS3QbmJGWjStCFyKZNMofd7zSuXeIswtp00S89GxnGns9tEasejreBXg9qavK

N9PgM5EWDqbAIGCrF+gBFm8LKtYrJwXgx1Y9gkOE5ynswSZcBdRQHmQG6pyb1pncihkPdhvLxCSORXMl

l3hEGuQrJ3y/E7ifxVUvrTaD4exz7+1SAGs0GfiHio
lVaN0KVBM5zpApE1Mm6azAdv9NYZizam/t980er2GWv6LUsheuBhcZET1XoByp/0ge9L/MfYa6PmFDyT

mdPlTVNnFDJxY268ehzrMt8zczRVLVvJqhIZX2flJCP16B7fZUTmsH1MECtgBVz7ggQjyEVPFHGb0v51

sTjmLYavgay42fqn6siP6iDRaqGqCceWiKSwgXqKTy
SnCapASvDcNnOxEzuZlPzOTKmqQG72650qEAOF2wHY+zMTUpJ3W64BQUzo5z6xOLYrPGGTxV/CgPn6bO

NTzxdv1UXXpUDhe7EVvRfy8RX0OpF9nTMSH2dHIDl8lF7brg0L5ExaTlpLWczvYw/z9SFdho1tWKWwdW

5iQkczqleHjj5rG7bDqj6o0u+sFIeELmaDMg6z4af3
BR/0rpUZcqvqo56jiB9+m7Xcfjt5Tb29KaruYzYIJ/8lenK9Ibo6pYBUt9GJ/8GSh8V7nGoadphf3n77

aCbP4CSCrzEDaC/u0eS9VtuckF/HcY4Z8hQ6oKn5cLLwFOC0hZnpTtPTCuD/2Oa3w+oqRqzGIYkWQR5Y

YcLP39NUby9IEc7wutFFVuyRXAkZwVSct4OO0BHm35
Hbnjg0TQJ1wlzrdzkiPmXyi77GTPzmULM3x9nKVNNJ+gJhlBTAvR+AM1Iehix4trEsm/a+QBdCPs8Dzr

JNK2UqZTxvxUmWAevcJ4wmz2RjPwu05lUzSenj+fuZ1OJY4cNn2QcMa2rKZPNj77342kfNLA/U4C2QNC

d52yCd2elQDb0rYCCHCyEOdr8g3SYRc1HVGS6+724b
1KlbJWFMCeQmZnMYvbhJIAVrS55NJTsZ9VRzYvv8HAkj8xM+u57gVQXW3V/gylm2EwLtD7unlZGOu7Lr

2tst31DhEfZQ33vKsFzoVFUs30ZE+HVwZepP4BkSiEv4oRhGmGH8rMjOUJ7IEeZWq5CnDu9qoHxQLmC/

PmQkdvKngE39QWs+ry+xYY3+/eETfK5UXz3+XGPxle
prvY0X2aBP6t69WnpPxxtIsJHRVzcFNi7IACzftWZSUzlN8rEV6IqvadZc8Jm1LEAxJO+3XYJyece9l7

6t02dyCSHS7NkI18yc6wKpgWT7irUwIQ+fGUuVbjK1e3EsycAdBUdhPq3owvkfIg9lw+riThmavhIxrj

2k0iRau3k1ikHFW7fYpXXuURv+C0VhVM7547KEYTW1
2/UkEKw9mX/9xWUD2UtLN4KN0I4zNWdhik04aUiXhwurwiXWWjE6aN6N4t1e4Es4Z2zWwilj8wE2ppmj

2A2e5AMqJrvBAliBN1dRc7yTws3/5qYrnsqBhyJs4UAkJ/dAFrL6064+sySzTNQTiXWgC+By955FSBmB

2ePF1x8zCX36P2Nae+mOKhglCnlREB0q8u1zvNJdfV
Uks86N8Nn7nromlleMM3YuWg+j1Xv748mKhXja7xRB4K7Ox3Xp1XFYElMff1zOlpGKX4NTlhpK9gouN8

X/UMoF/60NIAXr1sl+4B+HZ3PCHn/Fm3Xy+Im/7syNrdLjdhrClit+3D5y5YfE2qfFEQjV7pB5+tNY2l

nLPFK1ENUtsuOqRmkf/IhYhbkTMVgNNaSNlf8bTxNL
eB6Bthn4O7QkO1LLjdEZmFB4Ie54v21D14pblV6dK5qFZVBLRrqfpUWAIHg2tbIvwHCIcZXN+4FYmogq

T/3NvLwX+ObWgW21uTqj99kKZaXPlTuRCMLE4v04h6ArBvsnTc2j2GlOqXErwEFQx77ktrjJAY+m58I2

UQ9UG1yKxJs+CR0wN8GovTWR3my5sZj0Dvu51a3MEm
KiDH+EoXmrDLXgxtmpHbDoJMiRvoRLuxpNkv3Gt6t+L3xfjCuH5HScrVGDHt94lAOhAHgd8yubOsm/8D

GTqeTL8JVU1VPizFK1bmTcSnrA7eANnAbQpZ9kW7rYE4TV9CwFz2eF0lnQzV23k+/8U/Cnws1iMIvIr/

+k194zTyig4W5cCgcmO4uIG1n3L1gpXo7c66qxm+OZ
I7bUVvd2mlryWCrDhjPSiCzt9PPjhjN8ooyc5b0vqPV5CGF3Aqp71CTT5w6+TRcqjnd0zCDN5waoI1Lv

AO7ttSHM7WirzjAbYhztX5wTz5d4BPpaFRx1cwf5quTl2OQDsUVMKzYQJv6Yss34uvacUgisgp0maa4w

NRT5pGRWWkWzdaConHsKIgcWLh9w+ZOxrtyaTmnNhy
fOuY2E2hT3BYAK7gDWdwt8bos6Fcl/3pHmxAHzYT48AmGKFjCGlIjz2iTuO22G7oDWDcSCu3wCbFgwPV

YjkcCunKOV5Ql+s0ULRdG9GtKygTHUQqW9nshKz/24ED0zZlMFsj903RUwMb4JvENV59CPzgrTwAJqu0

lByvigsmgZwRZo74xFuO49gIHDJ74CQuRHUS32JYHa
NZ2DJ+Yuv8opxqHGbIfapMLT9WYpdUEFLBDVEqrD3ckW6J2t186KAxvgcKuT3TIXRgD0y7doK2wWYTOx

eMTVOFuNhe8qk2u8kOiNVEO/IUE6CcnnMfPPrFy5yEw9ItFnwT+wfM2NtEQXcecZ4qv5BLzcXcJa1A6N

RRoAuDP4arvCFuTJQ2qyhjXOZcYw3rsoMyEY2oX83x
GyU3I5ryxCzlDs/xh0z+hpcJ+fvf3NuOEqGJ9c5t1aU1KOgLIN/2rjlRnfOSutC+ykK255VXtsAaRrTE

pbjW1xcRsF9iX5dmDp0J779/tijkbgm7PXOBCrmmhxVScpxwm0xwgRY6rlRubiPu2tEt3tnqC1YfHNnD

n0EvnhrLJaVXzm/piy16uU2DKbR/GLEXa3IuR6m405
uCyDw8H09jCMX2rmdXjmwcQSAnjjraswtpNCQse+V3Wa/w7ZVoCNeZD2snPOt3WjybWnTLp6WfFVFguC

3Nr/8l9WElLoaCQNSBbgbuhcYpIXk27xJym1hA3lb5kc5G8xKLOLCHEvR9Q8b2eLJEiJE/Xuu2IIEDtK

bS5DkiJ8UYQCb+CbYGi32wzd5zNZrcrzrb066MQQRO
4j7RqBgSt6/5AkbYgWgaXpx917G+oCnFNwmUk2B1l27LGgbmbFQfiy81du8VKe7H0TNZwVpDEG+GRNWT

iJwwpovqE4N6Ym042b/QR0mc6RdoAK6aH+4kLpqSfMLWc6Nr8u3WLrqjXIDnQEcU2TMhqTNB71nwuEl9

t1+aT4zW3pKvyVHmx1WQ+BoyoFFpGc5JoIyavxB1b9
T97IqJ+3JPS7to5OA28Rk9vcJ0qVYvZKh93K/aqSOMBzXt19H253KcxMRQlhE1ogp5b7nZ94sF92cBap

WVs5/QBbssgGVXy+8sZTIhs5zSBlHlEnxizoe9t9lg3ak/stvL5JbWTrPxf+zI+LovZkt+CbjSl0KDJ8

M4QpUDKyh8Mo7n0MTXXGp+DudBQD5kG+eIWi6WmdvM
kbGoCr1Ys6jRknU1qVJ4zu5uz44baac3wMqt53yGwJp+7PTC1BAgjEPuKYKVAuFQsTks+WV+3hvrcUaZ

MO9SJ/2NjcicOfuLpJCcL6v+cZITncTUg4ZEjge03Q5I7WC3A0ZIklSrYzbsWZxSo2jA6FpjMrpK/7qs

oIoHWu4rTQnNDbdIHAAZ/ftaWu547Tdd//OrNV44f8
Q/ZO3rmeNe1Hx99xPiqkaFSTmM39yuUegUJ/SKv8xP8Tim1Um/0rh0awLjVaaUCL9a2owFXp9cztS+Vy

RzhszQUwJe/A/oyxh+WASIjDrP3QKHBwwDeqN7WypRRoH6t66v6RcRvl5oY+39K3SnjDX/+aC+L1JkfO

iibYv03+hBu+jA09Gs1gEhdqq2oTQe3ojxg8UBT8Lf
A60EzitTY7gIZ07Vp1Vzy/q2UmkcQPysts5woA+dvpA5PltAv38hQva2e1CuMuQWVnHpE3Lj9R5XPrJo

hSN5Xz4Jw6MzBEYSD6uklsa2sX7+d99PIdPdxYYIbI66HxLbXitjCjgtr0z/+rkaSxIE1pq1G4mOcpfS

LrCtB0nzreKrrOC2TseoVonIUJt+QgIdsl/+imWvIT
J884rg0tevEZqQBJXgZ+ECVyfMyQEHpa2E5ucJlTDWg3Oj+hmTQtM+PizfICxyrUNwSfyCjZB6vG6Uz+

WIN5eA+fsObCrRBce5TMZtdXq37AH1Eo4CzpE5lNvtbIkRty/ZE0E1hX8sKbkKmsd5BY/v+mjhdWKQKM

lucía/fk+JcDNIVrv2qPZjSToxY8y022v5I1R9b5ZFwxB
3rGlqqWU9Eej+yPuzdrdrKeGRU6GQ58yPZhQV9W+rKfOLy1vk0Cd96qBh4oZiOw6GFHOTMLsAC+Jg4K4

oBNf5JeYFi3lUe3YnvtUd71CJ4z83Fwl/eMcSe3rzv+jose guadalupe/OXjkfoMM2VJyycJbjXnDuZGuGtgQbgY3Ig

CPD5Y77MNvBlB0o4BSKDKJyD0HNRV1ev+h+QK+JUuT
NJ9NzE/a9mzHnbbnmFihfauQga6c3tHJvALguSbZEvonTtLBPi1GljA9gjGHv93OAOqedB3Lf3IWHXJn

acrWWOOCmCsZnjG/stzPaicAGJQtHupm/KLEQEfPTm1f2cpTbszpMzVmwHqmWC1VrCgu7Gi6RQqQQdC5

eAIP7Vy/dwNfuJNKSCEBv2cp4XW3HBFjLwmssSfx3G
PzGezom9Y3GylXcTzXdSsNVMJvPye7CzL+Z23IHt8cnPIh8PMsmk1hGSq+emZXjzhtAhm9qPznW7RXzA

HcRFGjZZx3rFVXWOVMAFnmexDU6yvbUn7kA70dINTpr62PLCBTlutcxf/s5GlH5jaTR+kXRitpZSVp1i

6SaFW+bBEcnajRufAPPv5T9fbghMVpye5Zn7SbyQbO
KPL05zYBoQ1AO0RYEQRvSi5Bq7ul0EB5VItX0HMebX9zeIRuaLYr5C7i+1MLrPTJBXcKYtA+rUhQKA4r

Sb64l5mMWPks7xD/BpxlBT133HYtMHn2lBSFTyiuv2XnMK+t/zFmsE7P8JJ5ey2zZlwjdlOJAcoAPjgQ

ltVA83vte+6gdMePQO2RIQxEnDbYyDUvoMcS/vSTrm
uJ/UdG/V1HEaucZrCEqii6EB5mRnC+rLqi5+OXFBqIPOTKDvB9abC0yVOb05bHXg5S+BAb0Vf5VOF3Np

AzONKtBqQsBj8Maw96Wd8lueRisRcPFL2ubY+khUJdFPj6LZ7y5n7udjQB8g5XOKLYZrI82mR1ktknhU

ogpIcEpHZh9ANOk0KZBeM1plBsj08mjThDchMv2AUG
WcXVa0HgZ/a7AhGiDP/n8Gun22HcfLDFric4OQBBJvr0iTbXpgFY1klRDuD26sM4LgU/ODHe953A1HtV

5+ct0R1z0Px+s8j7zl1M4jiwiGymUfNlYv+HItavR92qOVxtAicqnN/e22H82Df7eU60ueoc498L512k

1MnYOz9zPZG3LlkoYxIlXUrfIlYhleyQKqWKbLM+Q6
iq+OrZ4VTClsubXw42ADkW69MlHr2RzZoaTdRxnXJ4+M0r5KBrYt/RcaYwu48sCxueRCQHpAJ00RRanQ

+iv16rdFL4Oe/Yb52svy2tYp0QI37HE2E6QmDkO7foR+JMbIN/zvljMDVWFQSwA8oGyDus/8/BaGisHO

ubL9AGWlGJU9z8XHjqhUA9PT6Zrhk+Sa1Uc3YdTSGb
WGjT1bG0Ia/lEvfosduxAN4be56RH75lflsjYYL8osEBfbecvdfuFy3sXvB8oETVJzYxNrODZpYRsill

AWdK3a6Y10qy6v76M1lSxoSo2pXWu9x51sHCdWFm5lfsE2ul9TPKl0eqiOQ1L4f9yA7qUO7kmr5/1Cl6

xXjdZhBsJnFCbWsqQDcy5UbC8cgOM67lDPc0d1zcOI
+Ld3bh5KRrPkMgdblA+N3vnHq6VfQg7kwS6976A1FqCt2wmAZj1VMKaslVQHLsSvShsMu2HFlkO30z3s

PB5uHJ2nlZhOBV6Uve3rpYd2sp3Q19346cJn7laUhgsOe7UKh0l/bR0PtlIT93xIlB4t5Ek+H+hhaW6n

6ZHtVxMtC83Yb8J+FoMpM3uvsnoIFEogZmYiipmZEY
zXch0PJBdb/X0NVqcjwZIbaDFAhCgASF8mYd05NF/XvNtxKvEkpabTBrkyAjALI9vFiIzBugoFb7HbM/

M9ZjPqIPOQaHQ8me4v6Yb5ENXUq6VWCmOsMk+SP/Y0fmRMJ8zrEsggfvG3FH0edtOZUPPiKw4IW7ghy3

Bg/X5TFL0baZkMSk/L52wB9A2vXhuoud4lbr98+V2X
QyvAfsfC6jA+LgIL3zYM4WvepDK7TQ2z4mMidvt4mDHudS35a67IFhjCDcweltJEwE+T+k+BATVOV/BI

lDmtZ54cXcnNeTnvrvOjD9h7zHp385iNh2tJz35riX496Z6flBLgLOMF/tzVFJifIXRdMqeiD5lsfmhT

m4ZzqKI490pdBvZG6qs7XY6G9RHOSxmPcFVRoSazZp
vF3DrVcYc5kngx4UveziQ0t/4aSplQpPRQjOvXXurAZVw9hAn1VhxIgXKqs+KbQLS4kbiEtVqp6qvgle

1VvMchDbPpc09/50G+V/DgnS5CghKJ+tpymqRqx41pn+7QDBQfEG/EpjIBiwE4b2dDMN1U6VfhJuQspU

oC6SUKDkVVouPEv5s9yxoAxe/ijrHRjx1pUHTnosvN
0YKsFN4t/4dikq9eGVL3jp24O1uwZhhDgc6eU6yUJDk64VwF2/Ny5LPxAJxFM2jl10qqTj3622+kfMWG

aFYTE6MEOYVuHs+vl2VidLYj38rudK0XwQHS9N7HvVTlFjoSNTXqTwzjGCEXXMMu93vT0EAQdtrEkuly

4Q8Zf/eu3m8q5kQeMGGo+8AV+lLM2CvktfkJvKyDRN
h03olb6hdKDaeMJSIP2CFqqx3qR0vEz/veIhSyItvtyGjY9XiXFCVyIX2HJTSmsttOhUHHCtSuaFCzrg

su8Doje/BRYdFJtgJer2v824NEQl4BUplAi8AvV671g6+KSnnSmU9w0iYhqe4eHg0iLImaUCz4SzwL9Q

ijRPT2Ch73fNMg+RzTV3C6nSr/Mxxuon36PqZkXTBL
YatLi6KoZ2IZXOCMBTCY1rJhMtAMnMUX3xBirA3nsWOJNsFNSLKWzr0K0nxfCzvB5nMPFs8MnOpaeiRz

XhD8016Irw1yI1rW06zibqMVogfJdvGtWyfoOhffddX8679CpCiIbIq1911kUkQREeJhfMQg4RsWRYSP

PauUMbX3VyFCR6k9+KWBNfXKVgNHi/Grfkng6AAx28
V2jyNiinVwJ4bnGqc/rRU5tIgn+yieAsBTa/IzH2O4abJlZM/o3hEPO65Y7wRLQ/uFP3YwadFeNV136p

iQ9PBh7CFTkO4TTed4/nM/yKV6ViByGzyiQn26s4KbdI2HBngpoMESmQEAMYXSrnSYY/hj8G1jag73DR

3izxuVrFwZj5LtKX0u7g50H29Kk6tgFw/RlLFpAns5
wf03C1iFjko1XgLpRqTuE1Y65Q91Tlr9cAZzf2AW61SVRWklE2fEEgsH6EEMjsTfEW1cmskx3vmIwkjh

/AhmpGiYCXooqASHEu3xToeE6vk6sS2MbktqoX5Djgi6DIM16WqpOF+7Rwi16ADduD0izzVkc4HfVdgo

4LLt/fH0hOQf+bJvM/l2jT82zEPZnTYBi3utenstqf
cBLb0QzZJhgZM4R3nQakOa1aq7hzB8IM0CQ3Qtf4Lc51Z9JgR3umgO0uAynhpr+aUJSjoA39Jq5JPBzw

VOm57ZAOGHRlzVMo8cV3/PoqFO9BFK/DCiVWcA2FUwNOBZxcHDVp5TrY9YeaB48Fn8ZVTZBF64M8QwYZ

GXN9rXWfZLPpRtYclzLhmozhqTAV4d6n8O9dGxgQWT
W7kSew0hLr0Cf9uoi7qyS+lb6Drw8vna8VQCb4xBsXPvap8zzsF/Fztxhfn9WZ4xVCp3B2NkzF44k/A/

2ecTFYyKJqDo91ulRUFFMi8bxhQhlx3f3kkkMkp6cRtfU+uv9e8icSVGW4dmehiJVzwMqOIKkAGOtPM+

nzOsg8MYE3Mk+KQOaST/T7n1bKt1I06ryqkW4uBOMx
zMpRIjEenmtm8F0C+9eIm3+jJffI501iUtqztNt9n78o3jIeLlpgMaxTPK7AoYYT+pwxSv68+gYGnGeR

mu9H6DVg0O2YRHR6iPtAYPFZLejqFtiGmeQnR/UEZ4uoLzPTFui+TA2pCF18I+dwwa1t8Z9iLvFIlWQF

p3+RUt7XWEJ2+pXQXweIIxQJ+3zR2QGjBFH2CMRspv
5g+lAw5Mw8GbVzhtAsLt5yz8KspW5my/qTIpq6s3X2gqRJ0OtbSRGN+Z509ibfUxei0K/6TytPwEI1+y

NLM5we/kctb0QNraUsi8ryX2iW0CKRnYDGZpFwnbc6UZxWURaTU+mXCLjPXpkMysvXLUwnTx8oKIp8T/

hPnPGYgnWt1UUWbdloz6hu1ao2NVo+3u0eV5ixs32I
6uP4H3Riky8UQGFxsF9rL1efZCOYhqbQbOMSujYGUJy19X+V/tdcBC+QLSehKVsT36TyG+mVtBDN3KyN

rDoZjjrN9CH+/z6PDA0lfHuuh+K+Fvm2jV3jqCYqthxSGfWFfsWI8Mlu3TZv+apG84oAM1e5fIK9a+S9

8gzHHPpaLxdAgqVY6a3oSj12J+fXpS7Wky8g/YM00e
4JrSrbje7UCezQe6Pjjv8AVXuw74CeQ331elR6AGzYUJZV34Um1n3uHLE7mPxkqc/wnxa+a9Vrijarbn

M+6rtA6ygWs/vHtA/Jy+k9DJAKswH2t9zq8kGakorMxmCGp+JblIWIiBf/kU7k6v9Jw1pTcBgL0NvyT8

qyW6ALLOotowKMctVk5AGNc+wboV7DJ9LSB7MeUN2N
FBeBQuSC9XHiPjr8e5gV3uG9AkzeebqJ/DKNzZkvcqjN85vBWzFnc3qwASmNOp7BWPLnZ7QtJOsrDbGN

0hUsybpD0nfLnYP+Fb3drDnHGvAj5N0N+X4AfZDsjVeAiewKyL5eDPnr/G08F6GLbe0n7elXhlkTazI7

j1gdbJHni93trGaQgy119vPWMcKNeJb+3kJm0pL5Dr
u5JAqposKbbUYStZaEl2R8QM4Vo0Iceh01Od2AucjiFK2evLn40f/PBqnJ5Piq19Azx9n1qzxaj1xOXk

tgb64GcR1+ldB9Kuldj1GEhVPJOI/swXBBG8IcSERBQGitTHT1Z877oKO4q5Iufolo9T8W5xnoVO/INy

VLtbgwja8/wMAlRp4+tg5dzveL5Zrdpz7N9Ad3+Jonas
MKhLqBL2vbry3BC43jj/2bxyQn194iwiwFaYE0YVUP02NrtStGOCsz/pfNFbeZCw3VC2sgbjk3JTMT+I

jANDOV2TYOuHmQ28FCSoflD/4xP6yj37FXFTTxrFrVdJWuww2o0kcRUS8s+jk5Hf3AnMGog9F20S8+sG

VkR71j0jtpLUX8+6ZibmcXH3w5TUkf9nDEHZlLVZPM
7BmUC842YfexEY6xIGDFX2Wd7XLJ+l08G9iMPOzFMRGC8ifTYvjWj3pYwkE9BrSZBP6A+LLul4pmIG3D

34emNAz/1XrYQN7B8DRuxscUoVrLMKEgpyYF24uUwoy+S3du/jt2fLZTaSmsbXZmbPxV+9mNQDUmxKAT

VdbKcwj2o1vO5LdoYTXrz/DIn+mhJtVHBPKGeJZgyo
1LV7VWHOd+PpajMYiRxQc8V71F2AZ4gF5/muJ7TrhQvKuMIbjxADetorRAW3S5+tLXHlSZX6zgBFz9VC

NPlNMqv+/M0LR8SMD+4Q3CCOv9O7rGNqwxfDnJceG3jOY0gDXGdz5PM4OFBA1OT+hkVPU9ywcCorW1dY

QEWIVlW1gxvqE7/YqYYUVWkNlACeiglg2Gu2e5YEdy
NjKSgpBe8zHiatLqZ7AEiYVoDdrYzO3rm/W5ZVOdYwe0sr1D2cACP3Imyfdt8Q0ZPNlgJswqRMpQqlAb

No1PRay2kua4oL9sGpgAqnql1Hsz52Tysy5pVnyKqf5ukn7d8EmnBqrcKfOxoczp+O/gRY/ShUphbB3p

Cer+4OCpq7JS3BGRIqL1R8SnlGnWy8sNytt4K0mm7V
QR57hy0P35/ZAxDc0XEl7QOiBBv/r7xJ492KjJCWMFknA1Zj0JfMudQu2YgcgmhEU+Ql4QpkarbYwxfr

osm//D+3dbncQs+HGEqvI9AO+kUEJ05Mhe9IM4zsSdeGRlMUb+dIyF9flXbVh44ktZPs9hVue5z71TAM

TdhD/AWV3m6VZlfrIt/yxXp8NXkeNuK4cWtzz6A4jO
PAxaKUJkEQEMuCoDitRdP8e9L/xymPoirlWdh6ZdlLBDE773naaVBBc2hDmbgcThwmChomJLn5g/e0j6

wulhH7gOJNUY5EfaZWx3a0YxaKIEraoBaEl7k1z0T/vlQJOF885mxVmax239TVlZY4x5SLSbebqK9xkj

t9Cvs4XUnBBcgGDXiFaUpid3ZzzZkd41qz4LusNPDL
j/SOBbZru24ri58UORXXknV7ukvIhTsyEL1GJy3WnnjEX/vm87tUZgnDfEAbNr2PyW0gePRcy6jRjlyv

K9eqcFcn7c/VKKlvP/Xfua//8BGWXbyAFdnTIy5lV6mBOe/RWbs+4vS0ibv8YoxxnhByL5+fHVR1k8Ju

dSsdFvtv798f8hcbPG2Dr6pF2F5dJDBSvQnRSL3vge
zIxp96hZwSNMUywc/N2LpJXb4v5kdudWL56/LB5sN09FTBEsCiSCN/MCuQetCGJEyo9M7giYFJO+3GNh

my1/8hB6pzl7nP/OA/BSRzX6QQZdnUo6I6xpEjeGL81tkx/zpQvfvHJicpuWcVz6GYx89audl7y137QJ

Q6nVaiHEW3jLzckoP0Xkin2dyhJeIt/yske5qrIcMI
BvdyvJOn62j55hXlO+vFWfbxUWMcYd82e4QxOeHvogQRsNfs8YD7GJ3vUq1we053DdVuozwbDwfcGvCy

miGqKMX2ZD7PaRBh6lIiqoleAJcpfv5QONwS8kFH1Ix+p4m7ryQ4tua1UrgfUE2Sqr9jC4GgjZZx8eWZ

XJSU1NjI58isY64dDHoGz5PALsNmJveTcarhptgLNR
jGLy3nqb6u9mktK4/5A4/6+tA7lnj3ZhX5KJOwH5T91GRI687q04dKyhfIFhChtGhQwZKXET5fikFadE

ey+zkfF/tNyZLQmYPP8GzdnWeHgzvQROz6yJK0fwmdPWbkNzoQF7jVuSYIKE7SHIPLTDEsE4v2ArzR4n

BN0zwrPQSKzO4B56VSdPs2SRYo9UFRFaUGDRf33rbq
6izJCMbN1ffbe7RaElsB/A+kDi4eC4k9bANK//ayHx2+GRoDiXDCZMWFoz75cdFcrVr0YbgQqE2vRgKp

858oTPkaCyMs7edCZ958QUVzxikwCCIu2SRD6I4FbJ1D3/aEnDYKqOYGw8/S9XeTRhK/rsSTRVUuFMC6

VEmNEc0iVxVX0I5fwQxUuxWNg52SEYVEdCPBOJieaM
RHcECp2ynnZA9hr2Hj0EiJhYN/mfpqv+EIqSJodo35Zuj8+E5EfGH1yRuGK/9/dTtb1TqUlJzRtqykdj

mhiwqQn1hiXwqsj20dd6cpZeugh5uiyn3wG1p0D6jZjOxcvtfe/4yJzZsBgHI7g/22jv+67tmZCiSUyO

hZc1ExYgmRNfkp9bKmy64+BbLHSTVz231JF8MuaEGq
ODcB5tnLNeO9wfZACY1Qe0wcLdr+IdjIpP++OQLeFlZpf9GHWg+dOhzr6/Y0iYSC3EgyunHfGey3X6K9

if3yKhdYiQCcZ/a/Cw1x79dWZwIeETSXb6pR7ED2i6nRL3GiwXvkSANzg9tDo3bOBkjvuLkotjZOtpha

jxmALszKGPcHhlZZJBrvsrx8BvhZQRkCd4HdsAj8qg
aaT3LhO7FAV1zPU4BVRxSdNgohph8hoO6FJZPzLHrU+Kfic647NT9bEh4e1ShaWXE5SqTIS1ZNHbJanx

5IZ0rKRjmIRXG6YfptY2Hcd2YMDT9W+D+njaiSmK2OOy98BKu/9ANKvqgMvBvykVM3vhVpJXhkdVtryc

nVscDgIXHMxDekvEUOgMbtYEpn56gPQoXuMCL16Sbo
HWn+QNhZiRR5KFd1jo5imHuwexCMM6Hft7ufRMJpY3C2/pfmV/D8GQHKV9aHm8jHLxj6lxFXSIPiv5iU

cH+CxsEAIK7PH0+MDuyX7ImelYDHeYwydusnwq/ZfTxztWrD9a6MEMUcevGa64q4TgXoeggDbej8bcb2

bbo9TRD/bEnczAxnN0qhZiaSjbIRSeFpcTwo2lmf/d
i9lX0gtp6e2ZdIXPsrCAu/xjoI38+jNakkdeePf5bbX96fEhLfbItTF8lG5z08gweIMxd6tPNWG+5DbH

jHXiYIyTv2ym6xlPm+UNBd1K/Fjq063r6I53Gk0bFcRu0fkPc+pDZuwZZz9zbBCLMwFpIhGJYNkV6RWR

SjNMeccEwz4kMkxZHLQHuiBxT6N8lthOq8YOmgaJ+3
/XGe4Bo/rWDdFhc0E8bZav9FTFkROFTSIXraOXXZu6QM5OM+pv0Vw3tUeC5wfzmbjkFB80fUyym0AOVY

FWhzfOPrKhxWZ819UOSOfnoCMieTp9gsDtogUpg9W4NVJtlsmsHFX0zjMmYqURhAZR6EfuUAsoeJbv0w

EiTq5CB1Yeh1ieBiKV33uTCYJRb1GMeloKsFczUS9S
iqczHWAXdXoRZSPAONU7CC/KIy1LM/9C3aFdzHhsJvBlStjc8dz3L+AvcctQKujOR0x5XjoIyej+l/GJ

7iFhgNSINlM6aLcJNjSrFkUd03xB7cihv18PNjZTR7eG6f7CX2UEzEo09cCXLmo4cDpEqIBVzQNIyL0o

uemHkHo/fdkOCSTcfPYHmpdRh+3UGZ5L+HFKD7vYEK
iE5TrPYFfpU4MwtQYMJnlI048V6FxOrzTBEx3OKzb09C6ke0CEkXqo8GneXop0z/q3Xqfoixo3mm/av7

LyPLRAfvrmH20ZB68AG0SkQ7AY5cEz44lDvOW6s2E0mE/jdhmu/HOh7XAyb2mAiGhVHedqHrc2jhBqLr

9cjupXa0o+658ZNRFrIG2ewU94bxh5r/zkQV4xWJA5
kN7dXDPfOmxhnMJ0u7H4Rj8q4ICp36+huu/PbjeRUq21NlFdzZ/xp2DHmgkbIjDevS6cW+5dml5h18cx

7cDZUpMkhDMD2+OS4jb1+ryxf5BIe2WuibBFxiJPJx9v7xvspriphrtPuFkGqIV8n52MTtu4I1pwVm89

jsEmgpAaPjvg4QcEwtlYdDzeHmzyT6Wrg8bOJYynFn
84bgvkY2y7TwkgnYi+TH+4D7dfgzevvcimyjxhjh1I8n3RfBKaloVJqK8lHlfvLDcNrvarpjPzcC8bO8

gmkmcZbr5QbmkdJjZxpp9A61NnmWWBiCofny555uyUFO4SKsmqIJn6CG2k+QDFRh20BGghGdKcDdvk0F

JMivUAjDWdH5425zY1D3NqRz+ASbijZGhUUFkrIVHa
83cvd6/MY/jJ8Zqgi7glsFPt08S/ah91Qn6dgq20xK4lFo34yWn1B9uw+VwOT+2P/ujsgfsyjunkWmZx

9MEVwb7DvoDtKuvkjyz3wRr+6u0XaJi2JNmPuIh9Mso4tPqH3M0GZaWnWdZm2qKyQVshDRydRerHY+Dv

X+5wRa/iJmdMQtGgz2BPh72ZBAhbIHu/aKQMfbnvk8
klItE4vfoPiz8TCi1nY/PlPv4fgPImnSSn68gsgvfkdqsP38mJ30u5Sc8ZFDSiolrizM0hyrARxHZM72

RMoAfT7tcpcnndEd9UTGo3eRtpTIgwJcj6OutGx3jeuk+9S3WoBx1DmyA8LmWS/Qn3p5XbSNDOjX9gVu

CkO1tYVo7MOiVk4CFzMAH+6oUYkAHikJdNYNLPkfaf
2cl5Az27w2rd4KChpvxKiBD0JwtClXDzejqMJtVZErcdDYnFW/PP1AMB9Smv3lx4ePymna6Pv1x15F3y

CdNNVyJKBj5YJd7OJkHHELN33a5u9EWvleJz5VCjAzyHI0DEdWEmrAbBzBws3j3/WGdBemmxRCHpF1Pr

UDWyxoGd/33ykhDF6/WZLLgcPqRyJwFTHzo0fIThBu
sXk+DxcqUZrhlk9E2uu6rpGD6cA8xIvCyKqh/t3qpm+DJjTqojx+ZSwKo+vSjEN8d3FyDQxdIFRVYS0o

F3LoACE55tiMur0Lk5rxWit+4Nmje/1716AEQ/irTdMvxh+/2mXVuepiVFSkMG85TLFj7N8enclEzYZ1

866+vSWDPnL3CCUvmVruVVtih9WSsoH3yWp4c+K3Nt
YRuY+hMJaZq2J6cOaog0lHFPg38gNAwMIFFI0ZwS5ScPqkUcL21T+NmucYQOEZXjqC2QUXTB2L4Q74Tk

Q/MzqHITCZ4wlqX4tdJusKSBGYBUs+sEQ+GAD5yW8CNgveopHiUfGysBqP+X0rS3v59eCiDhxxremUAY

jM5mxbykaYouWLhICHklo8Mh1ESfyMoXpCjcaMFGh2
1jn4JDrrIEeld3rrAGJ+A6mJoT6L6V3922v60KlctjDxNWVeTVX1oUzb2WXdpdXbjfyvGgbbiark6Xxh

+0L678KucpedA806GdPw0e5qk89JOSIiiv0lyB5Pxfl3zefm2Nlcy9TfjpQxMvMa9UGq4p60m42mzw8T

ZGMoUyZcS/05JSrc7bdJk7NVa+dJAuvPS6S6Gtz7O+
Z3L7vLSaAdtEZjm2HlrokbLr1axcHkm2MVDtJOje/kUo23JlJO2i3XQxYXnkyfWkgTTCcH8o/9E+Tj1S

L7b/nG5+f5cCcOUIdk5dFu2xhyi7wiJdcWsi31Hl8OK0IxN75p+YpKdYpHhIJFQR1fDBJGCA4OCnVVfQ

W6T3+VEyfLmd/wYaoZDeBOWbbzXD6RNdi25EnHhM4D
mUmumoK8bUQ8csA7IKEDIq5rJnj5fjszQmidJC7f6RzI87CG4nMhydGv9AMPFv6hBdQsrNs1ToNMpQrk

k4Fpfsgmc2j4pvQy02MKWgdonm3vvRZL/+Pmo0Uoy1axoJB2NaS1755fkcg94RED5GlgV7ISgy5Q9HyB

nZWwoG+VpnPP9oYk1Q9DIApDvENLfEv2fx3dvZ+Q6L
W/gerk1C+VulA2excPbYw4isNhvyJL0rpyJ1GjjV1SMyNK7oc3+HiGsym7CZo26V1vHXwQxggglT29MQ

XNRXmlVp9dX8mHQBcGbUQvY83O4mLP3J2iv+ejtsKTmMW4Qv0FQwOZ/gTzyHqnVdLDJRwLNUH76hZfWQ

VZTBOQuTuTajpUYjPyAadRQ2WK4ifnVpRfUKy6j918
ao9xj8r8cXK+QYTT5l73/0nOdgb32V4fMtTPnZNtd0emtIkqFa1ibcc1YTULzwMJ7Y8uSkNTML50NAuU

6wdYme/vvxvs+oRhPd7vv8RLKy8MlVJzXAEtK/Okr20ouCeTK36tFl3WbQ9HxgttSVRyn4LpKvpGVCBp

iOx4lYsIE3cujdFfeB53kqnxSdK+CYnelTd9W46+x6
k6GEZhdH+JK4dU0X92PiOS2wswjnjeVf6An6F1Hy81Nd4aKHz2LQk7ad9whYXOXU9o+OQhv+ftH3QXK4

LCZKevMuR4DKVsnSqXNr8oGNm/x8WMTNwZyTPJy5V/B5t4IjOhAc/zVDEQfORviugRHHKLvb0yiWg0Mh

avekNFMVefv8h9JrHRWME5MX+vx4DJwl9Bol7vfXUZ
fZrPmcspGUcuvKx4ucjmOfx0i27JE1DUBbKV+0xypL3Pioslm4RwPsPs124aOVYCBsIPCsbPnV01uMgk

BfU5tUAPRkfkTaBlQn8HfmTZcKRgFcVf5PBw8bz1U/tRO6HWrG7h4o+6TDIWguOye3oedfZXGH4QanX2

pRPaycLFPcOqUEMfJuPmXC0U3R5oYEzG0fVB9wWkBZ
jUG8sdELCIELRsr1q7l7ESrdFb6NWSIprl69JJtpOl07pLxIb66clg7WlQULcQBNHlfrDhs+SDaRcIsK

O4egjidZ3/HWPvpx3gDTHIUng/jmYqzk6Aqyd3mcFuM9uGiG54ppJE74Jav1d+lMqA8JWAw8RsQgEZlM

AXnLyHPMVm1oMy8cq7EKLHkNytYiCH81SRa7d1KCqv
zxkrEGa40XzVEoCWNQfKSk3pmC/mv0w142doZCEVDeCmd3I8XdEJ+cO0Xu23DPsdVxMJ08LdKwME+UFs

uKbJvvKrYKlh1u/DHmP2tiVsG4B+3WhNJL/Shf00mEpLj4hrAUWjgdOYybrv4dCujrNEYgh2AhJPH8cD

VJ6lCOuARB6jNcrMQwNOMypTk/USG71tjo0sNHd3x+
6I/NzS+GDasTJSolyOn6WVXzD6ejoK4G0oZOyhtX5YXa6keoc74oO5rvQI8wYYBuSra/KGJFR6ZUTr/q

SuiXlKdoGMqsYsHMOekIeFk2LeVHRxwJ4Xz+s6d59K2F8tQbIK2dMUixtdV7HqN0z9g9/4ttbqn0NToo

6W2jJkCdmAgI73b7/g+kcMQi28bwRcbzL4h0Xzp2c9
pK+9idf2SGdvOvlFIFn0JVwuj15KGgaj19a4w08BpLnDdnbnBYQsMvl+opjNHRhU09Jb8e/s5aVNOTQS

fZliG5oSgOiVJ6VeapLV7z55MFUQqjEDxANK1iRKeG4irbMncP0q4s0yFsk1QteweNmqa4eacgOggGp6

Glx+RuQYwPjP440cYHltIBbiCuadwHKrGABLGDzg1w
/bXdZmGoG8ts+rugyoV1ymER78lX4Q16ha0BezSTbXjwpmhiMCI/x98pdYeT+v151VDN9Ip8i2g16IMc

mEWMyoLgbldzafRsjGLtxniheaI+ylhU9GJwG8uZEgFtg4CMwRpaFyc8jP3vNs0YmwhTQgNfhBJrtW5S

cMofnJok2O0XBVNDgdQPGctCkeodyKtyX23TCPmVpg
THIm8PwZgf+PZ1GcFY0/cwNoZKMO0Im5yXGa0a8rm1ZVZHh8m9rpLB94v7psAphZKhBvp03rEY5NXqR1

AqE3ZrJi9QDNk4acRamZ0qQYO6wafBbztw+XvzWtsCX9MqZ03Gw1jo16kyRIpMaL/751oH0UhopdmMmk

T3EG52wqrt9+HaxYHDdHXtMU3r9r8BbKL1YgVyt/Iz
jFchcrz0HU6JPIZ3fR+7O8YylxGxybt6rrWSOebj9tdGjM2mIhfsU/I/SfncO0blyI9tfEOxFYaihndV

d/DLG+ZSwJCiU1r80aCPZJvy7afzyTDdlN8/BfWjGd6Hv/xBq7cDlbRwQK8X/vIdZDiLDqaZOzL4VoDT

zTO8wlma4RLzP21TWM2/G2H1LqWBpZGB83CH8hPC3a
ejTwVn1Ci6tDyZOqTaeHHw5Un6J1eawFD9pZMGc10wA8tjFRY5f2lM5fdr4FXaxs6qTMirMmmmUV9Ig4

+lYGUCz2dUfrjsd1kD3BJ7cVU5dZw3ybnM/hqGn9VRpxL1F7KpfBu5nGiQjAJrtE+NZHXah6rahucojk

DkiDmjwCiAAhd9+juXmU5v2DXhaTAkeUecJwzYIPWx
XsIuOlHobRXHJ+wbLk5fs4ujr2TXeRE01XSIs+B1VFHusiKvMkwNMHH/Y/TcRG+dfRszLYW1vQnJDnFw

o/FwkEA4PJstOkTdija3titjvIdzQsMRpp6n8WBf3rRplELNh5d4JjFdswnJwr+0r8hRPoQ3zDjr+XCS

vUGfcEZedjLB5vYPS95biUipAZ9hBzJpW3iz7wCOTA
43BxZcjgybytPDSFRIsLqNvpUiDIWFvdVnsDA6qDeYmi1gcsgWHm1x1SPx1cY8jjKBvF+vFBwNMsQfFq

nBn958sHPGE5ZHF0Bc6c97ksxmW1eLDq8K7TgbM81gvT6/6RulO8Rltz5SaYVckm3XiCjRWdnbuPGM57

vhVwZZ3E39SbLTkGldJITwN5zQeg5NxDN+TqlsJeHg
VksV0V9u1p4wQn3b/EPjRqlb0hQNeFCaIaHJeUZi7IYm3a5kqwVOiv2uisvRodXqU+3GvRvtQAcmv/9d

9665a+UfQotSv77T61KdkkL186x14o2d0fgCCFjCL+1I8QHuGNxgR+oq9qZcVjDUDRkpLIdGkcTglb4A

Rppta/MoEMJlfUcwRxOOY49a38P7ZRjfvvzaga/9Jk
j31hfsdJ/ZrsCy2z979Ii88KTrtKya6/meEUBcL79Rb2eiZDaDJhimjN6p+0KLZdU15nindfJk1MtW90

liJBSNShRlrShPwhMHADWsUWvVdWcWQKNlsCK1wHD3RqNSfOXjLIFh8t8SbgnCJXFysMc3Iy4fadkXoH

/W6aorx3r4ApFOfZTwC1wn+JlVM7HdW+UrlZdhSO6M
UJzTQBmDpcExX5QAuNoJ0NdqsuGzov1+5UK3CeZ58Kg8Y9uDSp4922xa7CXS/n3JMTeEdZ4dT5hJpENV

tbVCp2Lc1fAoY85V2vZmdcAtIjrwLo/zehVNshrS9HVm/GoKfVuer4kg4cTHVcZN5qb1qj3z2JUvX9j6

R77u/Zw4LVyJAnBVCs4LBfqfEC4nppmYjKA5faWcnU
0uGkg6wb55PwpdCI/Gf6EsK4RKqAuXevK+VQvl3CXoO9iYHfjqQmqXATR1BViqzLLzOXTW3hYW/R5v0S

M2PRSq5RO4yxs24Ve7VWvpJ4+2j1qIi3ILRigWiphDc/Tgck2BcF/Fl/CbdLPoTVoQBHxMKil/jxMJi0

9g7IaNfqmVx6klFLzmI8qgeJ96HJBahuiQ1Hg8P7a6
WBpRjgPEyFng4CnqYUMsym3VV9IyKTvAIe6TbajJH/44qO/8TWPuv6h3xCSlxmbBx57Mw7R/wxwDEMVf

mFIblfemEFpBgwuiaIZUKu5FjQMP8nkCK0mO8e1cnniSN4x29GmrNwbEaaebLs/dKaHGSRiLnE3ywpV5

sDm0Lrzizy/cWGANz9O0Q/6wmxHg2EjJ4m6pxpx+2J
3ylqXTQwciKuqzu51K54nRf+PGtYtr7Ynhwj635g2ue7kDURxPRiJjVqm2ahdkDcBBI2lk/nw4tIGHbf

MxiQoFKhqp1ddXSxaqci/d+/C9FoRmI/Y4l/yAjcbytWjfHALrcHPPOtT4tnTeDfp2cR73rrFqRmYuNe

uy7LkVkCaGnb+K5Y4loUMQ+2eV/1GLOMmwTdoukHMd
q4VdAc5iIY53Ra/3FdKOvZSTrruM42CVMdBC6yxZ5Omr/cMLmOToMFLtMBM2tSYSjMrfnj/XOONKOuej

uj/BrnveYYEL9P2UeUTwhz18+MEWi2Y06RpgGuvQKHZLfjHO4kx0X/IHbCwrJxR31DMFzNxEBKj8zI7H

XV2jOEEgtL9awhbPKC7PD1ESrKkdRy5xScPJwnvhyU
r0D2/NRW/7M5L0UP1LpZF6iSAf4qsy9Ezvv1r+VXOlz83+D1FLOmecoTTSRr5bUl4e24J3Iu+0RIzIvU

zazByduGbuVB5sSN2qpQf4adZJo/IPG7Nn0SdNm71/DiIlsBfGBHX7EWHdHmQiAm+mr1qtZrEOQDV15Y

hzNMN5e33JnbGrNGYocLlTifoE7R2waJCnjx02Nx6M
4CFe/6lMq10dGCZD/xbC+g1idtSv95yXQn9C3WKbqooJFyTZuWFvlKLQQA7R6jC8p9jacXShYowjwrz+

IGKHhkDqlCu5yuygchUDIEvLb14rsQLcFjSsz2d6oXmvk9LTiw0OltPBNwuun4orMxOqe731ogzy6rW9

b2GN3k+JE4y2YXxq4PLCNZxCuU00ZBZXjv8C5Yzb0y
AC5ERMOWi6vrqwujcOs8JXhVvOyzK6DQzaWkddm+E4Nb1/JiJimLpTU5G5OLc9TlW31D8OaySZlr2YR4

WliaOScfh8B55cBlkrTSUnJJguAmV3IHPN6R3xEPql8phdC+OA/6y2OIxKn16WJKo2EgIg9O+b2cgFBg

wD1iYtlE6L5ZRjia5Kb6Jc0nY8eTokedP8YEGmNxye
tz+pLGmOiXTaERBOvWsX6A1u4BQQBBwq/Ugnzv2VyjgTpRzt/ybMgmO4U+2GG88qjXlF1d2NkWYX+401

a1sz3hW6bVosjrOx5hYSy543Gn+e2ZUcbFlahIBdwza5aDrKrd52j4TPN5jgIn9mL+PmVgWy8VmvJCb3

xzSDChcF1a8qGj3rJWyV2YAiHpvrOUtD6ZL4gcvIEj
6sV5Kd2B3CSFwoD+hkUktgEprzRqn3IKYT0CsBb4Jleh1iFgvk0pl5+JS4ngfoE75rPaUmEptNGRdBJx

FzL461goLshi9pkrg+Mqzh7SDmTX3zV8HLnoPtf58QCUpBtoH3XebxR1U1Vda29f7jGaoydbQFsQjGHG

6dc49oBw9zyMUTB26WIxXeRKrFC3+VxI8XgZb/Ie9r
hZ/wmTr30ig+eE5lQOzZ4EdPlc/+ddypKf5gQ0MltwtWk0t1TToF1Vwr3meTC/BdZyVzUzN+4BDEIAXc

tKgbaNXfniPvL6ToYjSpQw58OtnSYQl2QI8FqLdS7dNGkz/6/vzUXlWT3/9twJnlGApfAfoEwESGDgxo

nqq/wRXkJTjE/pPXAayLlDlezE1FtqWTqZaENDCzgx
+4menvrPBHDT95cJc/+mVet/wPTwilELNxRuidO2M+ycTTvZLfhSYE6xPCgCMm+tzT2sVh4PYPMr+UHn

8HHiAC3e/LA7e82cmLJegbwaXYAsQ2EUPJjATfF9/+lfY4NAigsj9ytuMegw0xM5VdqwmucKOYnqq77Z

LliqpjDrIBaHdcrd7e5OSDCdAhCBWdGPXpwPLQKkh9
8h2RMB5pt1664tcRRSdDdiXin1xykAM9iCJnkcUuAqtjp4laSdspxnTh5zJwQ7ug7XzfPdOtvt0OdzZO

uIjkbtdYZkoTHKB05sp9XSrZ2CyLUXzmf12YGY94M/66aGSu9f3BIBJReimllIdau3hlvh5mdNHoMCEz

HPBlfxy6d5oT7bzKQ2vdELUgAMQodt56b9f2kuqfO/
LjYzfPEyC1+pbyH8+n738wuRkKRmGQG7f+YMkBzhaBsbqOxfbNFbt5krQlovFS8sZnq4WEcATkdhCXy3

vczV/sl7/A6NWZqV5N4VMc/5I1oH0x9db35Z8tzz9xchGCzkWqJWC/u6ECAA8gs8bblqrk7VGMyJASrF

ds26szER+N6w9n5AeXOwPRyGHqRSH2qpo0o4tjBm1m
/GXjaohA+hSQzKkS/FL+HqPyuPW11pdgY0b8H3o/jkFHiP9G3hgfsMKRf/a7E4hlxv1VHJ/x5Tmb7KHS

pUfBufpaBubKvjAZlzSUJOj59bcrzN/1785mMOkRPjBpTeh+EjGqDOyW55RZyOVyR6v5WuR8SYW+TFMm

slaDenm6hU0EF3P9a6Kpi0kU3krFmuewH5+6XYylxR
dC+VsFNcemn0V6Wf8frBJujHfdafrdop2nFHuEZssqvri7tnG29e593fI3Gv2N8Yd7bdJwnBKx3oJKyL

MRLGO3AHRew2rRV0NHDrp/p8HVZhl1q3zG5IvaN3997BCnc/bw28Xcnm+1Glx/DxM2M28IZ3y1HqYCUe

mG7ptqzgqU0tz68UW+rzc13AKBJIPUx4/6bn6A/wZN
c8/RaOUqMQIeT6KJ0IvuApStqSzTESf1bLvADDILXqVkp+WVS911C4Xv2XQLLZ2ulvc3OYtIMlb8nU84

5tuEdRIhFAlevumVFz2/wyLemICB6aurqO0QA7H3QZIgZ65ZMcwUh61EccgK/4eqRF06pY0Rj68etami

JJDdl7gZa4dWLgj1mWpG5VB/hoKmorHI8y3Hsaqmk8
n/QADMzuYBYs2VjVAQ2FiVBlt6sm0ZLbuVOZoIPuj5EFwic7e/5PuszajFNUyLgbfh0w52L5e+CDFuqF

ubOP2HenOYJPXwveResCQClCM0e1yluXUbxXySLp1DENTVrLnkoq+PexlHo6qOYFFBt5qE1EE59uJ2dH

5FDnVx9gcT8IptthDuAtTRJpsxfDOlo/G4Y2RXhjoI
EyWRWeobRsbhdK5twxNR34Gk7MuR0mr2sfadNJKhfPiwvDXTNxvYtDRHY3SBTMCkDwCG9IFstfdk7llw

QNlW9Nc5apBMqFJy2t41rED1rR1oEx64Mbt1G260MuEE1izbF4h2tSRB0MAMkwCZwg09xdGKe2sROgPa

bxCFL7MArQVPzmh+8c/YZ8+PwjAdvoMu/xJ5/8lNGW
DCHOjBBBCutz3+t1F62XIgo8CMrkOnQdIljdzxfrOPluszwSfIgmOjQmZ0C6q6qEO9rrADlCNtJJd50E

DllB/wD9MhxdNi31BSZDKZWcOpoh3JX9oOD693lLVD7rpQdKQJndZWeadkHbA7h32JI2tjUA06aEfuXc

VLp52fxvctnVd2ZNtV1moLhZeBeuEtmaEF2xA4eE3Z
9gqkW3lFHQfVJ3NPMFrMKxUffuu3pwPf6BvIkUqhpy5jSfT8Ai7cHBm3w/E/bjm7dJrtTpNvyNaau3qj

OEzxFXQHtjPBp1EXbBac7WlZ3t1zD8RbFaF+Jnc/OdaPTiDkV9x6Nuj1HX7LlnzSVz7+VUOCBtT4MvzL

lMSfmjN2vD/ocwhG4tzQxVqeeGQbIZ7lqhIX0PXuYM
6kR0WKhBs/Il/9rhlL/yRunY+NE4nGG9oHCHA+dIMSbfL29YR6XL0mvnVVnIlKYFHQ0Iegix1E6exKnQ

PITwce0jmWc0kNZxwenLmYCzB0g7pglnq8R43Dxw3R0bSPjg4hmoWW9KGzYCr2b4PdedHCfJBWkjBZSa

3HiJZjAVCWwJGyWvoSWwO//4v1+vG9nX+nJb/huJbm
oa1R+2yJiQf99zO9J/uVEdzYgfG3AY/CZ3Ijw7+9km2gVdOPOUFkwtzLmiUNigFpHLAtDWAOttnSDo8/

CQ+N4D8wQD+8FU5WDLRfHZY+qdCo8N7xt7m/0BUENuQDfUc7FWAg8qzrsqGiztot1TWvJF4hFIgRjwNs

8SWXk4w/X2eSdRInIny4us4jTNf4YnPcLWQMng+bMi
JkC8/LRALeXuuMv7lafVh+DQ8gHlbS0f36pT/iFEp1TxuinN6S+MfnJQXYHOx4r2pOdO9aatGwMYOOES

bWJU706m04H3ktm6HI5p9VJ0bJpUlJlErMtqyx7Bn7cyTQMlHsFacLslTuEx+prWt5i9K4uUE0CqtM2o

kgnmt1tPBYWHvFUuay4bG2OdiH4FiXMjedcd3fFiJt
0l/4Cspod6N76Ns7a3hfSVcqSsVMywtNMZQgunfDxh3NmmFIraBnVziK6WTe33vXAGQbKDzE85qIZNzK

rlNpUf5/SWMhHN1htBHkWDFgUwuU5eyCHwU4BF9qp16UiiP/qhqvmP7h5G3bPl6+Vr8zd4fTwRG7cN7t

fvl7mjyC698J3zitIn7UMs+cJ8A/pVo2GpWjywAGD2
2h0axcntNeF64C+zz05JqURLAVQcGw37W2kFj3xLkqK3Altxk1bYlnRcGr1JIYGNYGZOkGYJh1vyw2Rq

sOAKk8eP77Lj3wqgW+BOKQjv8gxYwyNrXgLjlPV20/JxV+tdxMDJ7i/MiCgNRuf3f3nm1Vm96XR5+GBf

+up1PWO+l3UjUNBF7yNQpF1pum3HL9Vdj51orr7tR8
x/N1kqQwu4umXsBTTcx35fFNhahp2qsth9RLH++fxPZeo7KkKjo+ok/8ADPQEMdjUM5pDbZDI4SSYlCe

8+JkKp1UU+hIQSHC0Df3MfVAve3bn8uxoJPfb4pz/hfjnEl0X+0Erb+RxywFlXNFqyLA/wkwGFjJJaW2

P5Yhp6pg57mbtsoORViuDEQlEaw/Q2lIhiAn1soANt
t4qq7QlTpzAPyDb/2YZ38ubgp3WXKQRUp5mcArJWj9yibfhnXGN31V0Q+OivgsivNC+PLYWk77cOn240

nt3OWyDIzNK/8NPzYCx/D6Ipbh+gN3LvgKjbe9JacfSj70NSXKMHwA3Os6apDm9eOR+xQ697PV65ng7i

tFIyxxPyhdvYbVcvEf4wmfBfYb43ulUI8YrhIokHie
kEYJB53pPFc6GN2ar3GihqpCunNNQ+Yd00cadyKbYeBS09ai6DvmRtTL8+2H8PRT2imefTnd30afctOP

nviQEvh4sD1VgaveYEcwqYa/XjocpIprO9uZr0I5Niz8K2iyeosaFcH/qpsiRWntCln7RDcI8w8Cmich

0CdJciHRxo3jDlBwX3G+/1uvVuUM8xhhxB7CWBD73T
tpoxPc2GhpP2iCW2w9+TSb48RjfTIxsSJ/8hkKYAcgGtPHRtnIulTmR1DDV07/7JmdGxgZK5YyM0si1W

VuigiZ59EjDsSZTkREdMbG5jk+7D+Z/miRkODfj1QKNzfmEzf1qDyZ/zQ4Sn+Y5pljj5X2gMGN6TtMlN

jxMB9THl+tQflcKRDiu3nEi4Q5IqW/8JpZp3ysM419
e5Ja7oFDKEJS3CwdLCIDKwJIL1suUETHks9CjO60w9z5qby7JfD8IVFoTvmsbHdVrXzhwkFI+NdrG+VZ

7Lx29tHaXX9wKtrEIQ5M2xHjz24Vh8jfBtvYJQtI7x1cwfXxx/gx0pT3QElZkmWJHXJvauo88uAqbS7h

Jn48n4xepgTM6FRoEid9hGTbNsuNNkDP0yPf6gdE6h
RGGLWQqq2oaju1YZXhmGnKtSeFNvpvaZP+qJZDd1wVOnaIApkEjflxQP/qU7DMmwWsX60z4SQ5mBOghJ

3I+St9FtltCz7VjmjyeytZRXsKqAU/tIpe6sFzo+wlRLwH+Simon/Z5r0tp6O10v7XJU4vMDznO6RHK6Xv

KI8HqBZKWA4X1JWvXuz3cXIBAo7sOXtAAoBQmxPJeB
FfZnS3s6tdmt3gPpn9IxQu2tauOkBVi3ZgHjYjglg+3zIltb+ARffSUD2zkmvzPh/r0JIVACab7dTyBc

2KbE9yT3Ayd3chTUS8xv2OeoLMDM/PCcYk1wnf49MRNJJo6kYDDsFIMPV5K8E6kezcrTWNvOkxGNcGy7

dH7KyXC7x7m3iTHocIo+Q4vk3kt86DQ+XPnLWL7thY
5Ce4G9q446IWre4MU+YMqLdBDIS9fYBlE11NQG2L3d/jh9opKk5DlCLeazS5/uvXNb2o5tHQp1ZpNbNX

2MN6vr/BcZf+rs4m0hwykAA2W05LlIXO3IO/0GSrdKfR8+R3o+7pBdCHoWxXThEkK5PPnWUY9Fw79u2/

6WwefjNCbgA9BgexLF55aH9d00QLodvdNA4gVmkYYQ
crOZ0efiCRRgfZCb3kFX3VcpmZrAQJAMlAVabuNuwKXL0nN51bs0J28NjBZK75IFvIWFA/f1sipUk1PO

+8tCidCn7uCVZ07NSuF4T21KYksk4QeSsL41iB5xT1caHYiRSe6nBLKrb5ujlY5Rbgk7pQNyy9x0wtUK

GDYHfDLH491G75Q3h7oQ5twDoGdk1ux+7mhf7gVuhu
PYjQepTuDZe5rhsWlv4M6j+SkGfgus3xIGwaSxfL7GflnEqtmKstXsysoq+eTQzQlLZ71ETS+80aRAvk

UNbOTlGr973gn/r8uf/LsFGZ4uR34pdDFQ+ZBde5uI3UmGq5dTNTP6IfBukkgU97AI8KsAaeGmBHb6Z3

5mQWHP0E6wXq5925Dj8XMF17F84k4ZidwNMP795D+K
Tpfdjr+N+ZCzyr31FVG4SoxqHjD6ysbXSZgAyhn+qhAUyZUuPbNc06KeQgs69wwkc4JZrsOfuyG2/lqs

xx2lw9tvrfOqZnIL617HI+PHiYp/Vru8m3sIIxcoMYzlgUNb3ohdHY5OcCRgV+P1lWcLiO8cyaRhsEWe

3nFHvB7+tt+86iL4SHG3kffCrostyroZZdSh0G2aEQ
/RkrpmuurOTLwUfvphqrkKShuRh4rT8SQFDVXliOpZkuI6/2dfY+/1Mp/1vvSABOfrAX1tnw7V7IP8yU

/V7CinSH3S9ldfFja/nPTdTMn+tkxIVUW6AXkHtItcn/tEbIou5n5t5w/HkPqjtWgz6MGwP8/c6WTtt8

B1L9kmkO29383A61zbK/mxCa+g4cMUmRnk6GSFe+6e
+ylycAYEI4q8bFTMHJj65hcoKbTKMqOdrB8aS0XEHo/LBCiloXEMO6uyS54phVFwhYYYpz2N8QCkuryn

agk76088AGGxYp4+cvXP4DWjwsj2I6OiofM05CDE/R60C7uZOmy7OiNaFJ4JSHQPzRrfPKXDjmRIf6+o

xwV36IMFzRCWZO1DHLgne1RL8h2jfAv/ad67FtLI9Z
PPnWuXmZymW383zwxRDh2ppV8+5n7Co6X79UExqAl6E2GdnAgI1eEBNCVqzlq+OKnqWN/n/UHZz18uNc

Tjeb8TEjYhTvfdBqp9pOaSU/iI/in9zPs6lO+Kz/sOuDvO1fcaU0QxBZZAYYOvOP/nZg7TktfD+YzKDw

NcIclO1VwbK7GXiIabGs76unVj6ExylibBiNT8u1Gt
hFqKHiHPlm/fJ2VR3O/mrt1pnDUvpcm4N/B902vA6kQcxoE8JhROeARYT1okz+YEy6lT1ngiaAxve2iK

TD/v2D0khNrgiBk7sS4hLv9rFy9HpX9e2KjgEUBAbH2IYSt3U3YlFz40VELKfv+huvoPps10CQj/AdAw

cQyBXVbq6B59hLhMjYi7tDf+v5790FfbKC/WhLsQ3p
loHfRv4JlZVPuwftXEpxCwaoP9DsXYINn4csUYUmNFI+BYDGiVee/x2+iu/sXzeLVUYeuFgAIr8rQAUJ

ZlEmYzFrIDujC//TJuXAtzW2wDyMxw5Zkw13cjta2bSobBLmrfik2IA6mtOpA4J+HSzb4l/czKXo5Kgt

Yh0BQDIYMZOsCbN4Yv/Gq30bY7+BehYiQVM/XsR3L+
1BmJKAnCpZxFVB0Im03umV3C3UUx/b25K7VYBdaiAfcsooNTwRJClW/JY7MseSy+MBkst0YMiI3uk8tk

/y8TpUupHRKVqaZRYUzHV62jRlObyYJnhZcldkIRfNCEYjMb5Oim4Qpq58wOXu0KJr54mI/LzlOAt4JS

oP7IkZwsn/cCpVC9G18wT0Db13exST+9MZqtJwZMnu
Mo1erQyrt26w4kFEkcBwQAf3fV38iKZU4tBWW37qyXPkNfMQ+JbgVdVM847JdKnRJZvlWikYkrzjLQMi

7Dl+D5p5oxelACcALF68hEurtemvP9atNV5jnozB8Zo5IaPb5IygvOQMPaWTZCIe6HP13lT3f380vYVP

Q1RnV41tsfbl1eSygHyJm/I560d6yOZO3eIaGCZ3OR
ux4TfUsXROOaAVqx+2bpQhBhDcQv+g7CsgPfcGoOsr/yHO8t1mGfUq8SjnJvd5hmmPL+50vnESvK5F20

ufGYJg/X8146Jq3v5sEGEUAYB4tsumIjdO890o0rjiQdbMAXOhp9XjoptUZvXOq3qepV36eHEvu0FNyW

X8csaZenrmJ49R6HSu845fLt6HYNzOr/qfbFR0Y7wF
EkgQn7lRQx40DFzkzge44rIX1ZnHcCWEgbZc7jeHUbiTrFNEoo1clFkBCc1/wAbmWOYItvYrJK/85cTC

TDE1bdw+bP2pg0s3hBBxSL5TjW3FXQR7yo4+Rb8YKjyTCIopof25o5sDWPrt01YlZFB8J/n48+HG/eDZ

F+N9shDLW/rfbUGTyBxx/39t5FT0mSBBbe3nt3Bo6p
6KYqmUBBpVD+3eAhzDxwY4RmNToDCIGoIoH+5WAx+lxjsxpuEYyYMPuPSlh1ddi+cjexB86pfNM7xDB4

gYehDINh8zaqZ5/lqifrY9OpOUROOwL2WDvMLYkBNGzHWgMCIU9WvKRxTBK/99DP3pqLQDnJFZiJ/Kimberley

tVof4oS9f90UKy57yhuoKM32Vq2NuTwBKCYhxzyi1m
fwqUcOpd6VX6MduOYtvxXfTyVXyiu79Kd9AqnuxWYXMpix8YyAeN/BUXfPZ5uWuN8yWq3bxRF1lb+/HY

aFjx/IqDR3cEI4J5WsMEWnyDC305+HpztfwjN5q13zkTPVfbQVBkrZnmAd5ctnrPOeh3cIqF+b9p8xOP

J7swrzyodt3fzC0xTv8fzfGENS9ls2OoPpHsI6pBfG
jB7t4Dq66dMYnze630l5EADgeRjj4Ht0F2I4/ri28pc6xJgxy000TfWgpWamVL1Us6J8sShH8Gf+TuU7

y8mUqDGHt8vb0fYjuX/mpq4aX0/Y64WzyKOhi3rriFdRJx4O3MCeC6X4qPH3EhvdiqOir1/9MPnx6Q4E

IOAZVSANYPNfYgH/FQ1s/9CDXM5erUYNu7W07tVtFq
1Cp5O2HdVzlIlU/980r1MEPJ4X3kpVTmEQK/GGA6m/yQcEWzNbV4GogfjZ1o+ZiHMTDJ40Ol38X+LNAx

1MwbEkcQOVhPm27iYZkPhiQukM4TQbmxR3j8tgZK1VE3TlFunAdgLsYMO++2rG0GKJZn6cDthWI55hs3

JB67B7YCmASPbB79DFnlMJ2IofRxlvfsEHMWNMF/zL
3ZhogxSLMX165nAvJWMbQyi76JlPNgFsXMhOdA/gUgWeAP2wt9DeTtuhqXH9/1nWQFlZAEB08znIL8Oz

yc3dqvBIoch9z0JXflC+ovVA0eGTbB69s85+xXrKYkAIDlF0zqaq57yDtETuwLyBM6elV/P+IfdjoaHJ

UDle6fxWykPcm9IG/+oHET9ka8C7AiACWSl/5AxL14
vpFno9vNxE31xrDMBM8WG2uVJex3vU93etOMQJsdH5CsQOB7qXIiFuV1sBDdpxKK6kuSVXvuRY3imCe+

XkfWX0CVxpooXtQpKzmrVhSg0ee85TexCCowepdupFPt69guWeXnmHauxkYpmzExkbQ//Nh3VrztPPMq

WUAfL2ZS9TewbUajVChuaOaQtgKREim2eqS7i0eOcw
un80vOe0pniliErha+MG6DxvuX9eTCJ+xLQlUgI0J9Ui8Tm5cO/qjtW0LJa0kCJ7pMmnyzmI+5BIe97p

STfzYu+/lrMvxhiWl2IWP2FViXiDYWYU9nTYyaOwdfIuocnKA6CckUzU8WJAN18DWSLL7Janwb6zlm1/

pHdKoNIj5n/4W6bfM6lkc0O6EbxusWNQ8YHVljjQzx
t0R2X3mOGVr4fHaTzAYdONtYlFEsTwLqlLLxjz1Dt9omKE38qKEBpO6C+OUucGJDfI60RpP6WL6aGdF+

9mX0lmtBRKH+mlz8WveyR06gtmVJdQq9F1TY4U99LrjUQ9k2QF75mr/bxjBhW2HSxb0l6YpHKpm7i0EM

BPswFLXdB7+OQUX9fM3mz5jdL92nh3/UbVsEGmPXly
91PMIpcBpC46LR8BkZcHsrBmycSBdSqzzRHj7A+3TjD/OfiWFGo33F2YO7/44hR72guFkjpUre/4fxJT

Rnqe/NHMPe81v1EqshkOotya5SNWz4KOySO/lSk9qAbrjnS/g05ShcKCPZIKk4kt22fXzZY1h3cjMAfb

loLpI7mnDGSWQIYHIv+ryVx5Gpb+I4W3/LoCuTZMw5
4TTWoxMNh3wMiQhjlJsj6irYRrnv7t9IbWs1o7cyVXxZajxRV2fdsoT1YnAN3cZ/MdjQwsFxA0TLv3J1

BU+7nDmnSMdJOyquABbWStjQ83SBja6Ak9F+g6yIFbtcJpm+cl0IwE2DqZWh2DmfOs+vtS3F7kOsODZr

HEXmYVXiVKJOIotAmeIKXEppt1h7i5AcSkUpZeh+/b
bxzdI8rGA9JOjMhLWhk4GBP5EhO4l2cRlW+ohXH6cyfxt7QccL/D8NVTgp3y7wK+1JVANA43J+lwlpBU

NPmkkFg0/VsD+47CjZIefDcSQXT6h42VQ+efHoncb0JZu04oAraEP9z//EA7qf3jBnYBeCzZ3mwkmb5n

/w4klhvnnflx39PIjdnxb2VSfhvZQVwhvTt3egoyRD
T/agME2L7heOgzxO15IzGCgO+Y/JtRLxEdvrZ5V7Wfr++qorN8wfppetLLhf0M/wq7TAPaOYgeWJMrwq

kTUesLyjkhtoDnp47gl4GTf0AoUzcRk2LWy8JpfBvBBaiWtVlMaOnhxeOFuLszgesbN3RhEecu/5o8Ui

6AuG7ERxqPg63ovFFCqWDyox7WdI0JQfxM0q5wokRd
1uCKkYNferv9l4FrtSXiou+g8bAdN1CZUooKaaydFnyVr+rO2+j58g5OHggCJ4gw4nxgvY1nFjvWO9g/

HWTupqNu6FBcnn7dckJl1BFGPB+LDCweoRFteKWNc0pwGOm0PwEudgQCTMQ0Iqj2tuVkMyjvFXCD7I56

fCb2F3QXf4tCDjRLTkrzMOhUDI7Z3szmcvyyj0dbJS
BEoGRtGhH7n5iwOm3Eqcx2HOtMUEGoYo8niRwBBCN77zJTWeK/nhVq5HQIaAwemF/35+5Jv4XLvbwJEg

pZeOT+Xd/0OIl00FJmB/fPgM65781QFFtYTPvuC4ILjhBf53x5v5maTO3k8+oJPM5HtGrXpRR/GsIrOv

BZ1hmNdh1XXZ4fGlI2AyXwwJznoncKmgTDhFZGCqU6
s8bL25vpgZPN/puVbrttaSa/MZcv9yqFtfYHNINbDLeiQTIUDLtAySKRmm3FIY7vcZWp7zrdX+QofVvh

RqLt8LBJx79IL8VrGhLnXDfrcw2GJt0vrW9CajaGcIP1nX1N7GiYGl1+U7vY3KqfuQMAY2OWKjvCSuWd

n4aJ4b1U4NnkuwmURPXozi1IZz/QHQchdnFnruwdy9
6Se6FtrnnfTcjo6GtG1XC6njUCX6EBJJqZLPi+3UE3aA3ASG0DRoXN3W8B7i4eFRi2WiHJW0LiSE4ySs

eQtkr/2qmMXSTDF6ip22AGd7fVf7GiraHNh0qm1NoEz+G/osh9ioWsX6Rm9Ed81Ri3Ax9J7ea8xyVmnH

YtdDCqeYnthTGtydk7mni62HID1CDTGi7xvGjjm+dT
wUKDm6H2ystJhcVM+f2Z3Q2ScIjZcxl4kOf3UcINhD2UDapxrVO/5kz5DX3BAjb+8U906LZFkPjxvxVS

+Y6cbw2Z9psgK7JWHdOhEcfdSVI22+mtnCDo2HBZFiFLQYyWuCnKo5k7i44pP4lWeqarzcPeITUP5fqu

YXG3Rb8TTf9sH7R864Ku40EYfF723AgfBMK+vcGGAP
2merpCp4wluD3SFthSFJw13jpqSq1uVuxePJ9uvwCjEkdPz+HX91dHfx5P7oPlmcG//7yYIRx0VfzvIV

W/+HrDt4ROnCBBVCLtGPoiMEJ9JBnA1RtmvuJG8UYA98ejUZb4Aq3ID20IRbXxqmyOOWDTyGd8MgsJyA

2YRe0Hda5xTTMZKHYIJdzfQnWp/bmm3KDxu+eLsNbe
9vCzUit+JsMxVs+QJqrvLmTK9uC2ylw5s4Bt0XQJxxAf0q99H0tN8NF6DArhdI8V/pBef3rpxoT2uNco

qQV56vlSqXmedVD/Deo0gn0hiiNxUW8HAtwASeqUqU/91O6CR3Rt1GBWSoiJtpNB8KvzsLbnQt8ddFiY

hFv3oSNLDGiyVKn5OGJYnwiO2O7dYurwEont/lq66O
2B21J2dlURW3pw+GV/RMzDSBPEk8hjVy9nplvkM/rl8bjhbsATnOLT3dmm29IP+gpW7Wfqg67lcnJ8Eb

ZrLw5wadSLaXLzxcDob7tfJuWSONzJnW3lGNJUdm0itfTNJheD7o21Ku99/OWQCqujGKep0Kx+oiPqnB

dHfSYnu0Sh4qtXmntXbMJQ8809ZB+b+PYLybEBhk3s
H/ovgoZ6FW2jLUiC+cB7PbnBP477XOkPZtK+v8kkbUWMhuTJiOX29YmjJqKWNzjMQiGv2ZXidbO3Z6/5

ByP5b8pBCIA2z6MxDwRhRoHz5h0UfRc6ciZGUKfJvCa3msHHEFha2PnBOWPPTtYqeBmPBEMiaqSOoFyh

JTMY02sod+9tURFbePiRdGQby7WNrsEQvHRcoIDI3t
et/S04nCPYwnhvFg+pgMGfB3PFwIighX/3r/qWcuklKmCAdVDyx6I+rwVWLv8lBtOvseGhspIYn8KTLC

fHlKfSGHFZX4GkHetXABuuebhyWYXVRKDwihMfDQVOxnvKHxpWgfHMkgszkmUXtGiYHOofwtioJSKVFE

UgMaqgDVO9WugTkrFKEx4y8DW0suICW+OeO65oXBUk
Hv5qCGFcycNs3q6lX1pWEcWurOh0SRvO+kP7KNIr8atBxwFs/DefHtYxpq9lGxgL66fT+h9KB3be+sqO

iWyB4Ekc1HwFSFmX6IH3BsrLXlECmmaYD4zdG81wgd6ZLTu9KKzo9jjl3BfmsZNdZtPmhD0D8TDxuEyB

aK4gPInOm98fhCrwj5GIpsUAaewjZqz0AhnRm6fx09
wJIOh8i3cQYQb3Tz2/HnuqWcaBZMO8axFPlwluXV48xBp+2KzK+m00R2EF4m3m08FjMyuVnUH+M4S+G3

7S4zzr1iD5qPZmLohbeO86qZT9E7iU3Re01RWSEWCIPwlcl79p8XUjly/6WYkFVKb+YWu4gGgZ+vy4U6

ebum291Wkk75jo9LREBq6opFAI+ATguHdXHkzBdLhn
LBCVQ9v3ysw79c1/7Qbo73zIPcL43p/aTwDCJwDgwxHKg+i7MrYjEPTfZJd5d1yuPZyOu2dpyinlskby

GLzoDnDHIziyShlQSmnUUop1If3jOfoB/qEX8LcD7rYgeqxn0LjGwcyuZqaWFWQA9nitDpINGaJzY/ld

qPDhC8DKtZ6F/w7QG7CfTKpCSlfHXaRqRYFmnwUSk8
A0un2z3ymnGJTosJ7yUyrJOB+ApTy8sRjT403RZARSBsg0w3aaQAxfRC0g9819T6cx495WQR0NM1WNZ7

qPOhd0/ySwboaxChXSEu79Kr5X/H88KDBBkTBEIPKJjdqIE5mKwERKe9w4hkuVhyRwFsSlVeG0X2y5Uh

4gnp8n+JccSXDp/W3G6USM9oi9yrOISUNO74/J7uNd
9o0iQIiWCJBH+n1b6iVC+hcYJMFVGDwZ4yNFyMuMKhTpOTik23cKa2udHORmq9xe5HN7nenwwJwU9zpx

HcQbfaNYTMWJMhgVVFAlPv31h6PTdGVT9oMVC0S2/N2+xiGou6wXUlF3K+zRaENPP0v1KiByn/zS1/1K

ohe7KlgyuDhGsSbWq5hvGKPutgEwrzrQGdOh15e7dp
QOErxrWCxlKvaGnW1+U8Oi+xNOE0KX1mxvtkJEKZdipnLBC2mszR9nhKj0Q5l/atxoqrNheu/SH5VdBg

2IBuNR6224WT78ievNLLwtyg4gMKmP1PW+dZD2+zmVNNPibjPgdnjA2SUMEqarf7cb1dCArUMjIbHKfC

vTbSN4yUHBVkpUvYtik+RJ8bD6X0xG+c/lHfU85qsf
0dA1O2jtqqrNEUO1Yucqh962KuXDq7DucK+7qfNz/x1p/2fARIEH0/dqwMfgkKBD5U6brKVpM2XtQskf

k/zh8T4d2sqwzhFSEAYwInJ7pJvawnVAGG/d8hOpblr8lNWIez/pyRS8NXeKgwQLjiwclTlCh+VEZe/r

599eSdcCNbp8xrXFTE7kQaW3nWWXgxx6946zMwqOhh
/pPiSxNgKTEOfWLU4jmWX/e+UIqz6r8939uBG9/hnDQUwk79cF3wlghRyT0lTE2LVOkcZPx8IeBykVCq

kqdmHYJhEiugbKiKFuk0/TvtRswsLlEwCOCDnfdmHn+nsXsc2cyiG9epdaEeUUxOiajPm1GIWwrW6Q6u

vRb8QBO+7yZBTO9YJurHdEHiqSV+mzPJj9+LIh1wJD
+vJlfr/jOOi3aXInJ73iTjNUL2L7tpbMyEe0AhPnEnBeOsIZVpngRFdxrytTMSjGNwdaXpIN1Wf2SJa4

AiQEKcMO/gFHZwc7QB/tg5ZFjl9vevFWiiOxKPAsYJndHkr0bcTKmmCR3PKc5Ry4GaxtQPYZOnOOJPql

/ki00pcKCEnq9IzhD2a/kvModL+fYUKgh5KDTP++jz
ofiuBvFdPC+3VQzHGu+gSgGUR+HQe4QGpvVnaXSPcAluMC6yYXxV809DtCgwi7ZKxQc2+jKH6oG3Uiv4

lTOG5T3GLeZglK3tX3rwGZH0WDunPaWZ10mqCid6aXnyA4pLAFIJPkEytpx0/BWYhnJzhFfEmKvcTPc4

oe1wX3Ai59+T1hdCaxg1h+fBdTODzVyjoWw2zc8Qrm
aLLYWu14mpR/kNq11btGvxaXE7TK8o+AGs4nqf4Ds9Y6w8MQ5XV14j/I96r4yIw6pnEk0SmGP4ouR6bw

vMuKL3B5fhPNtDU+kGZeKAqShUMGzDggk1bG4IpoZuCM3F2NOqxAvVMZQ0nGFgiX5IECam/3WjR4Z855

Q/iWkZGkoEE69ukwZpFekScnsMBVL6IvlvuZFyqj0K
ajHaS2apAd38qk/nV3bcEez0fV1YJ9gOfDmvmCm8NG2ud0sCM/19tzuRmOj0OVJFDCJS278Nu0xa8tAZ

bFTXtsUl5xoQXoxO2HYn1gxbOinjOdcu3p//RxAbg4EpsXyhMf8I33blDJKNgFdS9qnb75ERkfnWO1g/

FT0JRpYvfEaEIHDhNEp7JqRPDpvpINtKxIJIupeI7b
/sxjJWP3ZCznE2i0mz7Na2y49jE2rM2dO2k/zZJxkse7Ll847EY6Z4Mo8YyP3PvXRzrk7Sj8+wzhO9hy

5vBp+bG8Xh7A83yB1mKG0fkHRm2SikZR9WEkrHTDpoL5QMeVy4KAADeig/asVQ+K6weC7uvtA6MbBwkU

wwyvRoYxfQLuyvCnIfsth7pHOco87nqgB28LtjnIfA
x4CVSH6n6tBvClLHHwMSF4fGaZvVZZ12MdSJVY6TfLqV7Z4FGf1UI6Azb72lZoRaWssZhqJvS9X84O3o

DDDHdGYjQJJXvnXGJ5kVKvE8LoHpIbRemhLpYTazzuhyqx+45gzJsiIUwgFAc1aEyuzyMCLbqOj047Ws

Z19GuF6HqQpQOgyiPm+Qwcoige3gMuX06m/ZDMLQKg
r4UmsdwzOW0L+zfL9BoUEra3Tayrx8/GX465Jwlker2wuZLBctR1h6Sc/Fd+xR0WaBCEr1tVKj1Zy69c

FX5OWzZV2qVqwGZ2PoeCuMRZ0QihNmAyMzp1Kt0YCjWPEOxCyWAc8ZqJNe5qQFD+3zK/eJoJdk18ximb

fq0Pm2RybC0TczSHAv0PEgiFRnaPx/55suxo3gqcxP
O3pGnAnEjCqhGW4KauU19NvzjBwsTj5W6CPjPquDcyN7aP3a1hYseqpXX1C1mhxGhu0grn7ccTD86wSG

yXKv6Q9HUCzq2RZ2EU8bSv3+625hraGCJBQhim8wvMJ8XzJeH7gDhkh8AlLyQAY7/17QMZi2Gg4Y0L+h

sU31BFOxj4PfPzYEUs99hgX0liW5CKh03jenudPKdq
aFauVct4dvsGeeHe4FqgKku6mjaO02c/ugBoMyCgigfcx7iGJ/W6bjbmYNxmebGXRkx8t67eoKwTkJ24

AuI8iqE/QgqivrciZCOlDKBLXQip+5zmzq7lhq/tiJxRewpV3i7+nLdYtaWmgKqO4smjNt6unm37WO6L

P1G29YnnkL+6GGGY2sEYU0ptpmNQeb7K8V2a+991Sx
NrP3I8YMGy/5UwfujIB9JcBgFuKJY/3WGhygU1tVHZlAHRMZ2WDRL8OIsebat/D4bnAjoT2vKLykq5cE

0Jcpo70hkRoG5nlkpa5YQVwVh/lom+i+bZnPjwa87hAl0qffuUTnBnykZVo0LNLc5NEncj4USRnxmI6V

Y3+CCwYmgoAfNx870ioe/duAeS2gJQddMaRnElg8Kp
QlzgYgKKv1EBDVTbr2a/gmoEK67j3Fs48M/4OE9zGXYeki/ob8hQ6+aRHpLJxbYQ45kAgn+araTaNi8P

mue6t2g5AR/aPnuBF1oorFAKd0ukL3PD5u1z8uiLVXy7WZ1/H8850MW5/LyNBT2sp8I8Wfw68UP8xyHm

Cz7/MJtbV8nZzwhXOIuEYXquOryKvRX+6yVOrF+kv1
JLz37i+vZelU9HGl+vw0F1VxE0zJQijaw6QcuLgxflrinAYGaP9GZ1fuBja+LyknDKkF1Bph8j11GMb6

kLEVoVpd813A8FqbkTSWhksMS69u/kVfbwOzvtS8zjngy3k9r0KjTBvmGkPJpHai7iuPbrz5ZvuNZVGf

ijui/MpU74YeU2Tz/nvFUdMraNER5qq8phBxGMAuLc
gx1yxnr4b9lk3m35rGB75swxXo+YdrL8TuUOdskp25sZnT5199NFINGuJyRbA5e5HLz+V2YAFJ9GnXE4

Qz1keHtkqd/lNhs6wpRAckqlrd2pw5m/hPseikMXOInIaKhcD5jv7sHJuMDFrcGobi+mm8731/Pgs5gg

fvlAG1fPRvGmQ/Cr44+HQNsyfTpBXqQ6ITpiQo9INb
JEEOaRXhsOKkviLiwyDvVyx+8dzKah7r8dYBO+RbyL3u6gOHas5cx7sjzm71uCdTeDfPSoysXR4WEWhH

bMkkAYak3P4Gd6pDEzGqiu5a/3ntGY0ievA8+IKPpApyTSwZYmq0+O50Jl6gWr+e4FbsCQ/YgSqm906J

a8w2n+pq9flP95/aV/pQPzK//78aNulM9Qn/at5K9S
b8ZSxRCXBH2Y2b03BeO8vtEBEYGWWnxXfF95Ia9uZTvhbSEGTy4SSjZ4C5EvUmR+2sgr4vgTFZBTg3g2

8a6HX/ffOy9g1HYT8ZGhWXNVxapyhtOgIgwWQzfkH4Eg28kgGGFRDrs6ir61JgPVpwbG6kXWZuifZTK2

ojceV9QU/5AcWysT8eFKNDBmWccLGf5ABh7Rn7PI0T
/pWt8qDgJzcstc3j7DpcN3oqcyEwdtNIzvhE/O6lhx69XP2m4C2AAafpdv70zEOe0/j56aGKZx/L/iXl

iz0/r5PytDpAYR2nNOLEeb3ukUa9TAwHwYwHjMPZyUJDoLN9maOoxlRsb9ORneWWewtq3fJmQ9sq8xIl

eCWn56Z1AbozXhx2/CXSwIFZQ1XCWphySG6FntF0v+
P6w2IeD96/Xe8gqayDaNJOl7yr2BQEjzkJ0FRDebTv/HsEzhLb+ERzdxjmJab3vyezIcfxRfF+IRnujy

lhAMIntUlifWGB3fwy0rDRoTHjZBgL0h+SQDzom9oHc3OcpteUnQ+eWRbpsoFOzFSBQkHuwOPtxkm/eq

GA7CZlj7cDmKnDnT356ouji8LLYLrz8svjOwpM9pT0
IQ8GtcF2AxRwiRm8vsNE9qjt/id2LDwIPOK6XEEtr/LlM1tunecKCabFYpGnnvh58oFnFgmlwLB3Hafm

JTGAgsKm0VoTRcfC2iVABbXqX9ab3+zaJoTCZHiyxalkUJHu5Gen9r1A23b06rjuJc/LzBECR8dxeibO

0JLYMrcJS3JW8XG9v0ThI54g/euVroP2eUvZOOV7Yu
U7EZdwoSc1PxlEiBZiPlz3CJEWP6nujjgqi0Z5cj5Yxv0IBx9xqX1FXJZkORZlKdxv7x9ubuaCrjH0Qd

CpAHnjvHZoXNoQDQeOUPuK9kLYJ6KaUuKBS3P4/tY1LJA777tIStR7/3Mm2Z/jH33E/q0MAXSH7ANM4h

cLWW/S/FYkL13lhRKtr7JmDPWH67alnT58owHp+RMo
JkW8V35dzMGDoGWV4U6cYItuq6t+nNUiAuuuupsmorJ0O5mw0vdo8DRuMU29nB3hxqExjQsh4+UFlKZI

SZWr/B/vpW0yhVQem9irJPciV9jR+uh6uNY6VMSNUYJXYBxk59ROGCqdFBP+cKtyArtGLen8PGvVpGSU

OOh0xukCZB2e9PzHZl24ZJj8HJrfSg6ym+LX763n/2
2BOsf8sVXySW7ZEFhk+Idzh1I8AdeBNQ8MNf1ghaA1Sd/4xrdiBX7WlzMMYuMltCnnQL3Daa3jMnt7hN

lVSGeO3br+/K0U60zvaI5na281ZaTte3JJtXM9mMlmTA1bKhRWNjyCMpYNxmJWI5dG9Mh8ytQgJ2jZPL

6gec30k9sWU43u4cr5ifDva3+Kpr1G31DPCjHH/fD1
+pXlVF5i1zS24YXtFDvYXSa+0LG0akZtXNd5okVxNm5DZzYSKt3xN6pxFxo2F6ToY1lLZIRiLUXrzbyB

havUVur+4gSY8Hulf/tyiqoxTuCyVZPGZ9oEQhv7QE3VDSkz/VZ/aJjbLbGYvw7KdQ1Hezu3+J/u7JRK

5DYYQ2bsn6/luHxd4qQeLucJVnSzes11Q8pyfc0SNW
VjioLuw+h0cWbk4Z+sBFZSPEOflX4vG+Sz28juQ0uil7pxq65BpHvTvIOJaTJ8ZsGnwPKqGbPVGDO6aO

kWUXf3bO/FkT8bLL3FP/v6IEAFIsrLxdLOpkVXAd8tP7wp+8c5bt3vA0a2aYTSOLiXwuq9Bz+D5Qnovu

HBJEyn7LDzbNQQQTvrnRQ7BHdtS++2J7XovhcIyotO
FGKp0cga7Xj2PqhmpaBsNRjicc/y/ejytwLoxZ3aI6vxRj4uWtyxfPYORw4YjTyXCfu6kHG0BdedHLC1

8ysJYO00K+tocKVBDDdbjwMF0O4k1ubRswXHXZdQRwtqgvOxSVSUispasgpt2Z766potOzyybmT2HyVN

PbdIh35Tl7iwV8yQmvcHcRfdR12js2ClPh9D6E7kr3
EOQ5sO9UAtlWykZVNmdMxPV5tZvoT7491cEBaAn69nNBnHGJ2J0vXAGTypvifmx5E9uXMWjb6DM1XH5q

qFezbXKQyMpLXnG5Tx/HeO69zbEniP7L2xiGqcpHT4Tf5R0yhslbZptrvJP+E/uVSRGIO3ms+Nv4EtqV

eqq1NhZrXmfN2J5FbINUxO6L5uRJyk5xCgEHb3sn0d
J53Wiq/5NlNpSwAjDoGoLUPAetFbdCefdmfWyNShpIgX3R/ODj6ELMHJ2l/3cXTdra/SxqrnQptEpTRf

ZdoaeqjekT1KxDteQWEABUgCP+wtK0e4jl0n0SkRUsJ5q4lT9YxijgvYepmiE0RgWpUf1beh3xNHSjc6

uDVBEplMvnAfFKB0v73ASqNnKoi6ul5vxPteoMhyhQ
6I/36dyX+vXKD2EFIvLteQHWhBaWb+Ww9ehVNdDhd4AmNveu/z01GrYAbaz0e6NhBvWKZP8jMUQeMOe5

8EErW6tRHyQwYmrTCLdkxz5DomMaR+HecTYQthFcvKRUbfjlSAiW6gWpk8xyV002ptRvY3pH8mpSzwGp

qeoxwDa0LTyIu28tbRuW9TmfIVsn9Ob4CzuX/bPMqj
ucTU/omI6HtzYTggBHE67VhId+fpCb9v7zJD5PYvy3ZilEN3hRGUXAlZ46SFS/SkNt7LBOTJWn+G44Cp

nGdPlOAq4HmsMxLlKRl6mARf57G9QlmCroF9konGYta6suweO+661MMNm566pcQBfxtDnXTUSliADTiq

2BChXOXcEIKFaEfHgYs6YF2CMS7Hq+ZfDppIPFmwcw
hyFYnjw4vmZyF6FP5mSNbc1EPpy/GxrFAD25SF0mr9uYHgqQdE5Se6jlgnRaoy/gGbG/8nTqeT/aPUDe

FAEOlr8zDGfjJF9Qu6oXNIoZgRqLCkUu4bQD5AJpuwo9LewOyE3ndIQ8woCgc11EgGqsl7/4mjmSDykt

XcQzynFBLCoCoDyicoQYrZdwFAoJaHcEL9B1GR3+GN
FcnxLjF9uZ26tE+ZLxI8gxkyoM310ZDZAZlbph2Icpih5BUs8Qd+SLDS/zOBl2+A8VSO6r9WSRMMFqJI

O1d54QP6QghPzDjoKD/DEs2hxNT8Sfcg2Kbz6HMIMVOkiaROQu1mNA+k3b3jjBa9flizaNfSHKl+w/Wh

wnlBcOgzSdu0R98WljJolui+vti3dzjqISMFPxo2g0
6cNhhKR+ks5bWlbZIhTZnmX/umzdcp4IhBYqpchcFuBPrxfqAtRWoqh1nEmbBaHqIYjWyb8o0A9I9aOK

1TMC+B2wGhJ3/Vs19R8cQK2uob3CkGBpFddC3zoYQ7Wx9/ZisPEYe6spV94jOja0fk5a/cWhDM6VGZg4

J2uf3vGeQbpCMrFhqNEp4s2UEUF/KocJHxDECn16+U
VhrgJZiuhTrCqEBQ2xZZUKQeIR+XWgPYQHwqmYbkjq0fUyD5nytrTHPu9KdMzpTsbcr0bNf3XqzFbp6p

5S0uZRU2V/vkFGsv+FmcuWukpkdpJtNVXkmD9WDVFgqkPNMCrDCPBxWcMG3y0WXSEUVZw8gCaVKFWqzN

mNdjfBGukOksYn/752DxQGQ3Y0LHOpcj29tiY1rIhi
s87eS/b+pz7Qw5IlLmTS/9T8AVm8Wb5CcK49q+z9vdJdFy5I2NisVzyZrMIhZXdDwiJDZca/szu8iOyx

AUkc5nLurO5ZR1rwCeNolo4KjcJ6aOyzzASwFhDP+2Nc3eUB4Xp4Omc5onhhU13Vh1dqAH3cUg5eQqSK

xwmWCeRUAFJO88rZi3lMahv8tEtx4nSOIEkeZ7gA/x
1eZkFKwpmplhQsYZ3Z6dqwlPB1ICYogbkMuaOclnoAVC+I/0Ick1LQj3egDxKaleGP+2kisqbZ06Q02+

4itK6451+NTFg/G3TF6Y+sRwjxhbD2/X4El9Tdvp7Xi1WhN2zkljPXyGHPED91puE/q2QDmSbzRO2Bs1

ZDBj8SQvjZaQ4FJ7RzWY/j2KwpWjk4wtiogiRZCTVY
+GCqgHJHXelSC/7pd7lTJWB5o38GqoOUfgWKPfRJ984lPcbY7jdhlt7adA3ehmVAFBDLfT8WU2EqE8dR

g7/km5ZgenioNHAgFv0KcSP1kVB+qDAmB91hji3uzS1wWMB+p/WMil4gU9xJZysLYPZV9jNKI9STOANm

bEJwbeOW59fdR8uU0NyWlOVwWZ712ccR/6L9FlCIDF
oEN9Kug3Rach3ds/xg531ERE7TeMcfRIpYRcIDf8INEm7ph8Zl2auPs5nuf2/xIiopNMg1Rguf2fjD87

9SIfm7naArDZN4Xj0imdwHen9/eZI9BA9wMvd6TjIXCBOq+iESgMpH0K3gviGjrL4RLD63fPwNhqbOFY

1rFG1G/yTzxP9sswzvqXaRbJZyNpZRYV0PWEwxNlqQ
tbn9f2w9JiJzOHNWAYK7muJO2L7S80u9cdjJMTHaprlVvkfoDZqiJ/lFbdd8AkyTc2r7pzJfzKVnRrmO

Z3frG/z9e0e+lVJbp4tAs+JIYY7L/EUiyU5EAB3xC2QnJl7RvL1Tc9z0ZTuG0mX4RxIiBxKAewzTDyzn

EAhC7cKvsZybLo0+Q5T9h2Bt7tbKkVPiD9oByrUWW2
PmDks6fhapU5Agl5e8bfog57mSDDsrEntTLiPuT8a7v9JT//OkmevPBxxjFcsz8OJ/CsBPo9v3rSj/Mt

jUcpgcrTJ//WOlREuhaI8i2ANmonmUou8tODQAZlQBYNvo60601lG+4AtX3VV+2jflRIlv/oH1X69NGy

VJohoanjR617s3lIprNucfEMICkBnLhFsLSq/CAfDs
Q3Pj2kO/9f3rdxy6y3HwXorqEZ/CIPTAyneMO84oWmXppz3zOrelueBTTFRxHihCcM71vDP15GHaJCQN

FJ8k+Gf3erj7PcAq89/gShlOUTr4AXbrpf2srrIJ54XmXn/2S4wZEUsG2N0q9gbLFrjeQbruAM4g6or7

m+k8q7HzvXACyQuqo1Cs6evvygXOzVMC4bqUtLehZu
i+qn8y4iol+kGkvEyXz/fECu60ocYUqm2QT+APJR6YqasRWQYslNQ2rhNrnThVlET9T6qyWNX9M+D91Z

A9WAc4WaXvKJlXceXQtYf27G66/rkCo5cjK4j/HqjlXB3YxR6OTVRd1oRvrGB3xcEmAWfeN1LBm45BFr

LDrOLfqbDiq4VN4VLTpwvEw5kymNmkttpE7aUyYUSU
3oJM2lOLRU5Cyn15qjg8NKxgzRF2seF3p/386EEAGXLb1XoHLLcWDsoLRSe5tFKw56qU7zvFKGRDJOi9

6kbrRwLiTJBlfQObHwxswPoiXw/a6YCjHk9oOdj5IjYYwKT6KT38R57d/xtAnnRLAB8f0NNBFLw0L8mA

AQtOQOhIKqvPYqP3Rn39mC2gBU5G+v8hrXElcfH8Uq
10RtjknW/tpko70Vayd83wfiCzmern/+/Q5b8sGBTvWrolbJkwsf79KvVA4ZrVWH3Benj88Ls+VnuJeR

ORGdspy/iWApnTMUzvfUSsihGMdbG4+tWrx4Is/mgp4N4zSKVFjuXyuBT/svC91im42o+Vtn2rX07XsW

8ataReBLimE54CYVwbbNWkvorwcS+rzetQv+D5UZME
JAYoP2wr7Jk1zaaEbAJsJdmbMGx/8if7vJzmU84fhC89g87XLGJ65KVWiKx5knl8/vCnbzH7GE/mR/SE

ZNKhk0kX5uUh6Rq53HcPsOvrQY/U29L0XqejmjvCXLkxvJfLLljQpVeqQtWdyd3j08Bz1BehCKyBW0qw

sC1AtFwKUqJSY+treOeDeKtCYuceqve3/6vRGdzZF6
2zcgng5EVkLoLd1+zBDz0Nj7tAXD2tNowJnYlRSwp/j+uVuavVF8ifq7ipxScqV8bbU8cCM+RWK1WItG

eKV4g+HMS9UPC48bDsii7mwMvE4KR9IOTar1QnM4+RN7r9CGqwVEofrYT+I8F/pEpSBOn5K4h7p0jQYQ

3RUOsbmQaGLGxYcdg09tvHi3GfIYfxVWGAa27jNXXP
e0hBsyMR97aLax/TbPNMzCtpIYaDRBT6xN5E8RvFdrpoGQZN8ONzZKxF44N87BzdLFekiaRVJve3U/5l

C1rCSdxCdj9BRRmL8uyav6oJfWJ/MHnCHWes+tKxmLN+y7JlKaN7RQE7hAIPpPRVEGlQi8NZozRriHig

Aj0iRmeFQ2nUkYzM2biviuKUF6if6Tb4AUqDxJl4a/
610aN9C3Hcgw60FpMRZ3wEpU9A85rLBtzsHNnK6C+vgO96o3N487K3aEqVtrFNkw/i/Rls+CyabmxdqP

g5fKBPZtTFxPYFpNTGfezw8WyXcb0hHl5Zc0SeHu8cwA0OWtPLNwF7YiYrti1W60jwb96DOirqNJ9f2o

9U3OG1KOwL6/Blv9CFrNbV+XzPQX93INtMae+CPC9M
8gfCQ5+j5py1+N91X5u3Oeq4EE1vzoHjgAMjxZ7H+x7MDjM+zPfuMJBmzXV2/60QKkbE8eJqkO3cpsvt

NTmCFTQg3vS9i6r4CGNg6qLQLy2j4DnNA8eNYWbk01iui87zPHvb5aU5loYnS4N4v0aEoA6Vpaqn+/IM

wUJ+1BerJSDoQ8H1eR+eRmozF/BIKtDG7E7d89b5So
/i4RQ0au97SP9wa2Vrv9AnPz7rdWvKwXbHj+E3Cdwe0euFoWF5LCM//NFqgNi94rn/4FRqhgT6vCDCAQ

w7O6RiSji8mAaivX23sXycn5UwHTO/EomzaQ5/L/tPaSJkSYOeYbJL+aWoF0TXnuebNG9rm86+LaOhq6

XUYMXDDk6EUvsAYUfAlfW3MupPeAugpzZTnXsZggaA
gSQpUhX+kXcLI/G8zY1fn4jKmZU+XyTmTrBboUg+DUv1DPkY/1PH0DHsFSlDUKRNnx3o09dq0mvWFiGF

QUshbMMw+Lo7f8fcZYGC+cjBIkRtwSzdMpV70W2r2L7Dzu+nqz5F9A4YNL8AaW/Ay3cUl/ZoDn6SP6ea

8urdAoFpPF0odyPqr2/DlI28OJe6+FZaorjd8xatRQ
+S6tsY+n+0umzO7pGHUI7BGrYpw4sJ3o6d3NcNyWA6hbjyxDGY0XDe5lTXqmGoifqyI0UpmB39yPc8ql

K2f/g9igra0UGGw2XYGhhnQ5e/eFFr9jw19tB6xOhi6/glhMc5icesa/mEnJsfcflx1cFe+ot/z4gFf1

sOwxMYZqZF8uccQ+uHqNl6k2y/E1IVbNR0u/+pLTe3
1imhfVvV2M+I1mIFIMRITpDUiTaZ4Bflwi5+wf2NMa9Kqq3eIcrmJHiQDsH2rBz6EKju1YR/ykDCgrQq

B0GReNhv/UgP71G4pmFdQCGBSTrHnmNUNeflr6n3Xfw3BRJTbxZ6pbVbhXiEqDwx1+iKP+K+Rom9umXj

bwE7rb9FV/lV9cohyDLsz2fTBh0CjF69m1XpzFihcP
mq9e5BgCrns2ppcDJSg7ozLcjEoyzJv6pL0hZHRa9rtroJu++xI4LMnnD2R7sPl+3g6/EtFURcwC7QXL

s4G+Zwh1IkgwS69H9l5Phqzc+i4Q9OHC6lifUUdvwX7hRuTLtgYmLUkbMqpImemkkv6xU+s9JaMIPsmB

2PGvllPsKNOp0r4bR9ZffU3XJwbjvGqGFFq6ttwLHu
f/YRh/uF/XlOGUcmXZ7JhcIBGp0tdt/q7RwMijYaR0ZQwOwsz64Qqr8yDGWOA1tBb4cp4WAyU222k2GD

FHLL6BwNv8X5dQUJKZGbbtvlrdHp96ixwzKEb2V1j40z7lHuHzB1/0Wi/cKWCQ3b0ZTBJVozLJpbdLTd

lsdfe5EApOozdB3psjNGpdx5tiev0421FNiEx6qkJg
yvdqq/VgKU7TYO3VMSXau521/IEkNYFLuFFm67JrVp6MPULC7D65/xeN7dq800qjTpfYxh/3RRzWHlzc

Ah+fOIvSQV5uVQrewlKSf3HAXJg+nbFvz/KD1OjryLIsbppPlBdfkS3P+nDfbXc/ZQyVEJbYjo+lOJVM

4LG141dL7YD0MX1RUnACIe1Ta+cpJmZ/nH/pzYjJF7
PKIqllAfqCYDnoaIJy05NCu1TVyfZMewz9UEjYpG7LreF37Ud2z8Vo2GJBnBkbhyUyMJS3XtgNeivDfc

0rGTJVGEBxhSreeaQrjEgmgDNxZn7BnFYzjwhPOcdBLZ2NVx0Y+aWIeAuggxGfPw0sUDYMvWh5o+CviR

cs3Sj51S+bM/N7vNTStXxNbLCy5V51oGByqwRusumH
fQBO8u1vkihzbFELNMk3eS0GsL3oAvbrMb8n1lpCCXwTeteikL27TLHgCkxuR07dy/dmpqbPbyPV9LAD

RptHZ/6ZE7vxBxrLYkueWQ8NmrolfqjkUOPQ3wPr7xZb3jjS4mlqx684vUvtEUmjZZR9bYItTqB16ze3

7i84bH83/ZUQqr5L/9+yFNCYTF4wQSk7od3sAWYjiZ
ZSl/xJD2T/S3NUPOYClCXqbA4c18HNnfdrF6uFyl8lQNvc4AArdQ2Az8izPLH6d3DlkXNPEf2RnXr9sM

CGAmoh38KzVp7jkJ7meEss+1FSQ4BqhBNWDHylzp8WpGIXqIFusYeZPBi/OOw6Jb1SYkkhZAk91EwWDD

HhmCBgub11RWfXh1IUMkgowAgS+n8X06OjJQFZYeoi
m9SRZj2vi7NR9E8CbX0xuZBjB6OxEpiLmzEGcOgH0W4BSEg5/us4T8LyCzP+CAeUNADTaoNFNbL8GAvu

2tPrALcoVjTDGEuSZ4k9e2ek12RLIwT6zOXWCPF75wVNbJPUuKyhhSmARIKI2uysf2oUjMbydVFQvSYk

px3JfgS497nC6rmcQw+qWnnDZUnpfTC3JyyuESYH2O
03pNxECw2+EAzTwlRB2dxjqIvS7oEf5HNo9W5shuxl+t8/d5jvcZpTxW2R1Oy7pbNChLhttL8JnlBQxx

iG4kXIUEP3/u//hnqlGGTYcSz0hc2oRRzQ8rutDRnV8wUWg63sk3aSNwaBy9qDSn/CUW6pWrsuwYiejv

YzKstCcYJIZGcT0lE+V2S8/b943V0v+l7WpLP4VmCr
QK05BvEUvnkxatLVIVoQavJTnnDif1qg8irRsMDSF6ByIcqKo2X/qfPV02q1lwlzcJlLfkeccPJ/PV2u

rGqpLXI/OmY0sX5HKZH9oxsqriyVggym05Djj4/FdtLXZsjFjfZ6+lLlKCe5+A5q0uc3w6wpCUBiXVRP

wYy1VQCaqj6auQ4leWfxae8hW4mg7xptw+1EWTBrDr
IPVAv4vEDpWW8wWOZ5wVzx9Cp770nsBPqqvQ9yUFRrrciAJEvlzH81MLl7JW2YM5kY4hDthcsXEJYlzj

jV1+idV6KheI2iB75Gun6gf7EwaiRVTcBgVa19ta7YHk4a2PBTWw91GugaL23E3VpmWwV0nad3Ymx2/7

BlgWvhyKpHCaQYhHyjkahoe4EYyUx/P8vz/wkO6Bdy
raf6U9BCXEd+aN4rRoDP6y4xQ70RTSwuxxqpsQtAQtt5C7vW+wwDMt5gUoW57njqKuSK5iSUcssj7eP5

rQg5/Ri1Qx15Zeqt5nacy7OSxhLqldVvCuX1jt7tKZb6FkN6rwWj6RBI+JMmFHV9cmj9YRFzbPriuvMc

Z3tsKQiEfWSfEJL/XUfOGuIGXte2BZK6fUXD9F/c5o
10gUsz9MBgOvFQ4q3CjKjmgHXZ/X3HzHHNoUQE3SYSr8qlxemwu0r/LkMD3eii350j2geGwvvDJsYM+v

oyUrDaf9pUjrVB9z2ZWYFmooFZCvhW4HCjteZ6Q/C2LEbXfpK0QOmSuUzx7Fm9XhDM/pb846qxKYN1Zj

mg8QITPh1jkfQeSBoX1AJDV37ubVU6q0gcj/UUpbjj
JKf1t9tISnIDWbNiN95AY/kJ//aP2dTarHgm37jtI+cR9Lh2wNk1TpuoRCBGMxdLUGL8rxhQqlP3HbE5

evUxGD7dNQWhmc0PGlmul/89rHA3QBX3GrOha/CNJYEhZyXMGa3qobyWVDHinDmS/nRkENI/wkFs/lk9

fvFfGXPWef9SpgQux02s6og3Q6Emww0iUbc+xI1Ce4
n/f3EH7cLhyEqbDH2vROhd5yqdzURZe4FCtYznxzSBX8mY6/yi2j9fW/B3b3TZ1ObGihY+zJnUmBr5uQ

mREJ5gUiNtl67JIx0lgKlmb8XGrtnh4KwMBqsSEinjADU87aFx3sD9BVWpaIAUeaE55pkDg8BmZGIQAF

Rza2vV+P1MRJB7f70THc+gyqP+MBn/QTXrzPaEGU06
ikbjRTJt6tWrW/XxCpxQb0GAWyGVLU8pnJqwQO4lgASzZALv3LqnE0QkkIDX3iCtqSOKT3N+mskPygn7

yOYaPgR8ms4ezYFkBYln+ogKiDrIt0UN4kcdDkrlUi4TyV7IBxtL3PxYbEE1ytSFnBEH+0ujVp55pbu5

OJaYbnk4/ejXOEarFksY6LjEGUFJMKRsuG9UkJ9pMz
SB3/Xc8tvNBhoemhLkBPjY6IJWZw79vejhlRUmR+1tksbrvwZGKruEgAaxIOSzn9y3uApONnbPruiCr4

gBCeqXW3PTk1e+5R1hzqpbHXWS3LTqRk+3IV9CpL2swq9E9RaQzw+B+98w/+5GACsVxS00JJUC1p1GUd

y+EW25+Z/yqVdnCWxFMenutjEbbDfg+40uftgZLfWG
g/vAcsR2/xNxQ91T8IJajPvm+mW6+pxHc6/rJNayfEnTpU5dB6bEoa1o7ruTYkFvSIDVf7CsWhCHAuWn

H22h59nRxHr97//OZF4E18CAhKu8+dL4yNxObVdsfnvoAoAJ36B9O2Zhky/kENnItu66n5keZ8RZYGuu

8EnqIH/a4cf1MUTjoNHa2UZJeHx+nl/Nh/pObNrjtz
uGq99/rOY9Q6QSyde6z5ggUfBOeO+WQ0X6DHh+V1ml7Cf1oEHq1+8ozV5urSIPGlruq/cM5xfvCqpDTH

uBhhSASlwRbHLFdmiL5dFjg2pR9B+u/Me7r0asVDSceOIBl/2cl0o6n8JUeGTK4N5GzSuQ7RKqM7Acpr

B+rVO9PYNr11aVqnf+zwmxvkjwZnZVPU/D7p4NavcV
fMuJ3eezDNls9IwhIeSnBi5GnVuF0h+AB7LSQTpC66EDJnK266+Zacqq90U+VOPG6dEWzzGw+gK7oo3M

V/2FZf695/wlWRWTEkcW4fhFFGJbCYAztqxxvsEVOPOKssxJ6MkkkzeWFlyLbRg9RP6qpqgnQOuoO41R

Dj/pMjIaWZ0hz3xgM579xNKn6azD1HuGJBAetGvkA2
JtawvYZMwalDn/PjEi8sLBzJ/alxBXBzuijEVi721Ab0bIHlnZ1jrXUWPDobIITej0fkZxm3G4ToJJRI

275VeSIl3efZueZv0lvMSjYXQvn3E6l/l/xaEGWBPIRFf9o87osSdb9LJUWplQCopdSUkGsiNB5GIguI

Bug1OX80c1pkGilKNj6veKIhiTJgVNy1GvdMBv4tHu
ujcuYz0z2J1WDYjk5R337HXXIlQwDSipQ6z2OywEruQ6HelYOu2RSGXiJfAjkTg9Q3/E7yEcLjeWd726

T5vh/7f0K6ecpPMBaYCILC6dJfrMHWqD0V0D242xY6iQt3E8QunwWq1j0o8fCiO9jgqH9yZqhJ7M1hVr

DY9NqfO66FK6pyS9/K2he+ffOzDR+K7Z28JnNKzPPm
xol0vpmNRD6Pk299f5urCstf88r+miDYRZreAMoPFSLLt5TsINxQkWRlr46QVHcPuc76KRGwfVIQd242

UZVGvo3A9KwwTu7+JdGRJGCMXLiKJHde4z8tmNDkqVO6RgIO+tSvpGj2fPwrByU6zPCu3lVHGn5guCbj

qN0iqc5+HOXXCQHJaIxnnLRRxqycn+4v5RypX7zaWu
y9W5ZC8uAoWH+F1p5/HOHn5q094syFoyFO3OUZFrCIS2ZZFTBwusYYNaJM85i5kN3nfFTOlpJD6QxFgd

sHTP2EsOF3T7k82dvcGsefFb/oV6mgoufVKqF4WcUQxYr8sqXygEscmjKp480WruU9CrNg0fyzTA/e0i

HipwOr8wnQ2lG0+4ce/UDACpOgGztrDeNPtK6Dz056
Rckdghe0Zs/koVBnccP22Us8G3QHMiCbrIwsUNIa/0wcbktydKrehtb+dAus+iWIAsVJf9jOgy49CAxy

q55CpgI3BjHYTIcLSAVFkh2nZP3mLQmzi8VK4ES9WasNei91ZV/jaoLwdc0jnIcRB+EDxDoFBjR8+3Vj

iChahcczRd+jQUUccgH0J/w9ouC01oFfj5F5i0wlNz
VoZ6tGSpzr/5sDR9F7OlUeupS3ZzH+xC4lQoaEO/wDjUPrDPMtaAPBuJ5gzZviBF6FMO8CWcudJLK1WC

Ep6KP8/ibW8Dm9+1xbzp5UK+2AeMtq8z6cQ4c2fhfttsCbf0EPqeEP7IpE/X0K3QoS8nF4V8wodNdKZI

WDo9bux4Ud6b7PtrC4GR2Rj1+5lwRS7jBmgrSogBmy
GBN3aHOlHDqYpf5iT6mwtUUyoMYYfnBix4CepPtW0E2IDO5Sa6naN9vFoxiZ3nHi1pctPiMMR0VBm7xn

1HCOjaoYDVlQelTwwsiXe1VPNQQ9tE8hCaUWom8cJGNrv6IAAy1paImbLxr/vPpAS7uRLLNazWXltJol

cp1PVeKAcY4gIl+vl2d752z2RzRtniurxanZ5Tt3aE
deUoo7tJM5T1zO/+4H8f5jRBkF+U9/jSO1C8viS4/V4lE129mLUGMijjYFA41i45KmvNYX035IC/k0s3

B5q469H0QZsIo8rzarmnNwsb8HJff+j4sjpcL9QdmUMXHzUFuNoOO66y4C+SKGtqvWeHXDyZARNELZN/

GKeonRlVeiSJQE9iV5dPuHr/lYwoj8v5bOm8TkbwVa
Xkzh9zQBhVvkqOjin7Y7ERoEBc0vYt19tn5Nl1NauNdcQ0MsMyiGOVP8z5HwqWe+Eblda+g5K2its0cp

Tt1R+ldy60xTLaSaZ7zaPxq3unAlTK2/zeGbJ0eNOJB9TpJ45JKEtGGqzVg9yg3p6h59DQbbywves2lN

DXoNejNqliuBeBbH3rj2YXpZcz3ZxA3j418y4lM8Bs
fe8Cz1eqrOURC4VHYU1pkjkLAdznkQ79qmbupXt+q/+9G+NvCd1QLjfjCz0YmCSlvBwsywUkXUPBwQqF

FopNdncLvQM+OBVo316xXnx5kpvRR1Pllabu0b15HHsOPtaqdYvi+brgFocSmIfa25WM49XgM6gYjqgA

n/3gyEOgQNFr4TcuignV1BWaXJA+XJFSfUaz5LVT7B
yhVbu/cbtFYTb8xaltYyndUtVannu5BT0bv4QWDB7WMCzCatH/6/WCwh9iUVx7TPozy3p3PdKNeLTQJa

UY58AnQF1NeSPWvIWEyfqDR/pLr7eBzDFVV3kRIZh9m3y4G/usYUZ+M8o6J5Ke9d2685Q2CR8nT/c/Zh

SL/5CTKvKw7xWsNd5lQfvECF+heZR/5PKsGa1MMshH
ZX1n3zLWjH858+vqh72spzlV9nEsJFpSc0A23i15ZKPGg47KZQEshC+994kZJ/rwoaBzf+Ff7+LoK7Hb

UVfL3pfh9z03vQ1QvBpYEGkLDbyewguDQg6JyzxH6bKTaSyqU8GZOIw4gc+9m7HWY/nEgse3byLvlNEU

QV8Xho5hudhfvI6H7vRw0X9PoWNOAYaOtHig4+UCZx
Ou26GeopnhI0o4eQ1ZUTT/nbvJs/Gjq9EKL7nCNlDjAmj/3zGwNddMGqTUYkc7nBpsz/QYhVtrvj3Ucs

dcNrUAi0PI5sGAEeuQhdJ5Y6UzNS2v9dIXY5YBoSob4LHsHCcEe6tyoeK4bSkSwe0RpE2DSD+IM0JqsN

F4cI0HV9Fj8zF+vdbFvdqhWdPHqFQU58NPvQYK1jHL
2A9cOc8jSHqPKIrDCBg5S7kK15iT9DXPObg5fLYOb+IW4DxJo37XZs1O/s2+MrC0FLFkUrd+7Glg/HRg

VEbJrXAiQhrVXqEkQo+UABDDL7RCg89dY5Lgmf5w6DvOFOWd3ayEes4LDkn+P0ZJ5TqeaN1H4Vifqp0F

hzvZkyPWxx6H8P3NfMvdoQLe762FGUEkK4ojias9B4
J0LzW4natKXn1SkoIfEwZSPgo+79GbJysRizBzFxoXTv6Vn1ZGf84lsfN5t6lNxlQ59ar4Z/9u4qayHV

2WAmKusW9jJYUPIRuK+1uZrvpN1Nm1Iqu1Ei9bxk4tzoHMy0BWROHgr5u6uQKXrIX6k5ZzAs7KENggjm

MSu//LjblMWbj2uIRzQXf2HhfWublZ7BaIMA7j1lgI
/de4M1r36ujI5gBKqWmo1JlWrsQyvrQrR8tfUlQurV0vz7Ad20wZ/3rcF2KDBPIMxl1POoEXhCCa0Lt0

CK3PYZSBex0cxV4CFLyYkbw2fFbrIxSGPqyb1FIW5lPwTv6kgauhcS2Eysoe9IfDehOBAi9gkUK7hN6c

SqHv4njVemYC6clgU176UBvHJDeN0iWw2QUSJlhVve
gYxCvb/l2iaAtosVZxcIqlc06hJ8eEs2YvrIX5tiuEiX7O+ea7TFm6uZUkLdFneVkuephs8jpNY7Xps9

b/9U//1jJ3wKIKFc6eFpMguaOk14i/lzWaeaBtU0OB/mvBmnZ864co/uFgHTBnEi9oQ105TsN5umda2I

lBV+HKmoZrClic3rbgdBWhOvswR1NA4q+UYeIZNMdn
SwjhMy6rsOpGXvDurOBXsa+rhE3uUtcOzbXNP7A1+sIYr5QiHsuks6iLqkYz6lHgCQYYq8qol3LeBgWe

8N4WZxkgEOsJ73TgMdu7Yqi1WN7sjCeKi5uHijAtlxTfs2G0zPD0B5PBnaib3UvzsTeuQ4KXacB5ln67

SEkxZzHy3YsT4XSicmKwA0HhzSeuoreZRHamejIBmp
hVNWKPdyw/eXQO40lboymb3TtrnHBbiIj/ucpvxMUCkoWA20Cu81chjtkuA0l17J5PKVf3zHjbHcQMg3

8P0ejYhnGmPSqQdx+DpVI8u+VVde9DjaL2P/TYDGCsrYZouQFOiiapq9k+pYAloDZV3myZIBshybciVX

k/xvzLrTq8ScbJCi0qz2Wy72WQNB1DEVt5JuxlvUTi
aLvq0v7DESt5LR1UrJcQmh0tmiaae95dDuaMHBeBsv9gLOn6h5SE8lxI/p2wzhoTTOehbtNSowalBTdF

Cy4dK8XNAW5dVAhx1aWemlWjM4AgT4KiAhU7lgN1Q8S0EBZJAJX1+7CVufBleLou5RosGOxE0hxnmK97

6WH/mc7Rxz4A+jX7G6X3YR2lN6RsCsIsSDPdKcdZlI
Y1Uu2h4b0Uz3dA1WQ2fU0LGEc3Ps3Z1Z8n6cNtQis7aJjJIRMHJ8GNXAb2yz3ReWbRUpjEf/kvbmE9w4

Lg9bSMGTg11eA2R7SMqF4t1ZVbgXZHwdq9hEmDmzWqr5Q2yiI0N58/X3/SjWbYO/5vH9zrJl/xG/bU7A

e2rWrt16MoQH+jMh1FTwRBHfGTn8tED1MFXtOxosV5
my4akPZKWxQifN28lYrRw3NYj/pH01hfc4DM8viotbFYslmPkBqcbhDSAoOjHcTzAUU1GfwLDAEtnFPK

HLqQPYLYHU8pNRGMDJ/GXgq+JNsG6ljL2kCC8B04QN7L+demokyrBfIo1gPsUPfZC3KjVeqLAF4HZRio

nQS3eUgptyDyrLQPEWKcv8p+UGO5W2dg2HpoYkKvDh
sEduzvXQiSrgp3cAQzdmFBsWeyYtpDu7H+Brd/s1kAOXkBl3kbUmDXgrLzTKGIQSGTvp78Cv/y8aapCl

hSl5pE9zotltYS5PoZ+aKQfxbQ3Hl7A9BhDCdWw5bU3TsJcWXcAUm0WQIRAgqU26C2erDnv+vplife4j

B9JWeySnqa3swm7Iypn8ILSH5GD4hBpNUKlk3V8sjc
THHKIQU5kCNOSbvg8jgcOGAb4DrXfRDqB6wJckTq8/V9OK6f51KZQpTtl0t5ure2fGV8qOv/Sngak+wF

W9qzGzGStFnx5FLGt9+4dDpuFi9rZ9H3s59jpERCpLQVgjVQmudnmQzFKVKiyDMBQBxSKNrG4xARZUo2

+uUD3WwlH29KCqv++tBM0FxM8rq8F7RZRxJSBVb4Z9
F4vOlIa8bSOqW4l+9FW5WSemBFVRghFecpxnpJJ2sCy47Lrl5Ez4sWD80PihT8nYFSaYTAp3BsLBuAtb

uXh0bes4YuP8s/6rPhu6vlQSwtmTMpd3hMfX8Erkvvh67TkNgkLUkcPlo3J3npOL5b27eGSP0c2+ast9

AbjECWOoGuJ1MhOn2mHYsX2CATTIxqGzgoc85dE+DZ
u46dfAY5gaOgWUeseNZJyGKZw9cDTW6BNp9yZOVn1JsP6EXx9hmrnIrazEI6Ve+OLmo4+OVvN0aRt7iL

3u5yhVUrz8gx/v3bOCDMGbTc24gFd0pXjKeoBORhuAcUPhBR+EZw8PEy88s5UqNYs6nntOENuAyWBmzs

THAuylbkmR3P4dT1zdWTU3JS1dobv5s3uoT63fVFfh
6uago3TkNlOk2TFsUeYyy9O95ASSGLwCC966gFOxoiQiLc3pUFzc9aAkGS0DdduY5fQuZ4gkotoF9X3P

L89R7dv4aa7T8pCl3w/+CkLUrJrpe95lu5SyCTmRcmF7aAjlwYYInYpPb5efwEzfCD7R3CsGy9PGHOGS

gfU9ahyI0S9mwN0D1kAkbgNpa4PmcFS3B97v99xRDE
EDPu/SOgvB+9pWeyGaygFskaFC+YhAPmADBsGZrBlZCVQQldDSplxflXhEbAskf7aOW1qIwBcgr9Bza+

Pzbfh9qz3jGc6NwGVpZnRA9cGtQvfah2i+YqmS9tho0X9FweCkoB/8Q2QrI+CDXPFWzryPV1idkOFy67

DZn3a7yFVWSIef7BHGzWkVKKLNcjxZJ1nL1Io/cGvE
WXfG+vpK+kxzwuTC8vZINtQtrKYqcHb0DHMWy1honCX56tBzeSHsQa5cVQfeK6pkjsxdNKhuxEbbiXHi

Dx0F8NZHmwo+3mqrxIbBBVNO4B28fdjRdp8ZWaMvKSBAtGMoAfIe3ih6IjbMkm6EstjhKSFrcZrbLWbL

zRdFJ3MQh3AYwRbgLfd3tOb4UAysPS+N7w4Uta5LK8
ZF1+iFYnvI5VU/X3bsk5XCGDltbLPsNPEpU2T9iSLFrOwhDBak0c8ewZcSBE7grqvBxoTo/6ZCp92dm+

ebM9Vbnqgw9THh5oKce0kaUdBmyWmzkUmVldJ9/GGtB3nQw263n04+PxZ3Upkl9HnO9J/1KFKSQrpguQ

6/LMyKs+HDGftI+92vTxstWsGvdfKhcAellGVF5APP
6+So1gbJv+LAY8EzuC6AbHnj4ijVeSWNM0RGDjKbYeCohFsiW2rEzpQUj6/jL2OqrEfRIfPaIf8ZVlVm

qfHevwpYKO+XSNvdwZ7iX16vI1D7ne8oE1hRZmt6+tmf0rNGbdve0Giw5U0fCubhF5wbTIbG1rWbd3wi

ejVEfbxHmCywvo79Hb8pHhk3mV2NPl90e9DpcR5tOL
cgt2kj10qz3ZPmyzsM8YIgmDzNPxe5bKiO6h+gn4uTpE+/Y1JuoX6twoXl7B1C+L96RdNSXXEhwz6t

zkqxO2qcMqDiFXvwFSvDx3xpYRaY3D/pB9HWhmF9c5EnjiN6rsnJLkpShLQeCO1PUAYT/NMw4jrwmY9P

fehSx+uWyIB1+N2CSOHKTJ0o5CWyrtxEUWH1ECR5bO
uhHOM1q+Oz44zXdRepg5Ts7sYPlKq5+wZigHJLcXIyzjpC+ydnQ0Yrwb9Wd9ud7xwmchesHGnv0fXaeS

ejYCbJRJB+Zj+rLvtlqcCBVvs5QDIv7XJl9CFQL+lqQJLdWtR5DirtjVdL8k1Z268jN67nUaH69fCFa+

KPwywYQzPTgagSO4LGHSUwtu+HtW5K9MCmvRfOjlly
crJ20sxLyBeyITnpnDlkM18TWc/TU7ur54JfOI0GqaEQV41NIhdOb3z8F1tsTKe6ma9lz2zGfBAwJ8iK

uojpfArD2PdphcdgpO3R0uYRoSj8qu9GYyUgwOfG6ObjZAsnx2YAWfFnPY2cLR/7gpXB+Lt2r2xyBa5q

ZpVCDEhSd5D+aO2tysY9kAtRzu1nYFWXw3+DPRwwBg
0d0VzrPjs0pSJzO9wf8RSSGhxH8+hWtwJjwFSMG4KGG6ER5M29z2WE5ETHgDQZawb0fK1ehDefagL1/K

kC6PiyQlC1QYDGiNU7oXLtxUBvrM5CcAPAUHpQqZDFX44lV7hg96VxhX/BkbmUsMTz7COaUWMKmePeD4

OkDjPUu+axT7NdUWEO5UOnnDq8wyj3TB4N3sd20TCy
uc3CBmdD6ZCUDz3f8vdcTCBcZjCkSZryjoU/0GEXudiKbJOM3EshatjhoPPsLB936CoTIogDUPglODLg

ZPyLD/1fDLPmN/D3Jdz+e4PriWXU03k0slWAjhBB5/shLb3FDaayDNrtA7PKarAWhQvMeNLj53jyUhyV

q6kcvLr/oGsFRS4+QUNSrlkV9G/JpY8n6WY/itn0fe
wDspKygYf+NRqly/JrcUQTV3nPHeoU4P/5RvnHtnF2c+LhFBwP1PK38dP/NrFNI4iPFbkHjo65SD54iO

NtaW+GbcqMBnc0MVinFFucGdAYVAyczWrWurb4JZb8wvjaEcGksegMKsjoH0LMpNHSy/IrToIDW6159s

J4B32oZP84+42wrg+M+fiMqjHUPTCW00j2mG7DYMnl
Be2AS1zBMWPghTF+x4saeQ6DachaT+tA2ZntSsrS8IvM3UsT8fzqs+Ejd/gylZL19VEKPyW2U2H5/kTb

Njdbzd9ws+ZHjNhFY9z0tJn+v1qWsaSajCjvOh3my3rILkwhs/5yO/TqkExBFHAiR8llR3blShzH5qP/

DrGb7fuTdT1zQ7ieKU1mgaBz1Ol0roWaTk+Fw0s3AB
1XA9ASCLbjUqHq6KHb9yYiTYEHN6xUpT0fqbGTWQeTfesR9ewr5KKLpqxruLmalOeZrSN1JDH1xA3YA2

pbxoMs+3Sr76Xtl/QYqLVl6SGf2YGc75APa25LIfFkvbCQyDWm3bzhCHK4Z8H5auQGvFaVLyUlTtvrDn

oYIoQ9B+CAdwX9xePGcX7SLcwDKMQ1TxR0FBLM4xhi
z29S5hGXDY1U/tM2Tfl2OnKw7/gb8sqsu1J12ZsBE4wUkyF2DAZFHE0K8eqR7YIiTHvrsMmw/k9CwkPi

KktQf8OLLzWLHZB5ML7++/bsmAedYEcimHe6HZQ4k7XXqEfIxyi1kWI8VH43xT5tAsC3FvxTMA/rEGGL

fkLQho/OzFkMA2k+TstXLFWrg225Lv/1CJ2PGhxQ9I
lO9gHG34d3vOfYlnSpG0hkFvsDxQgWUNE+Jy7nMg/n/FHSUPCf6B32K9Iri1aAa13r1eVQk/zaBL51ue

BWBz8dzTbHOBa5VTRRjnqaestwVTWgzpQcNaFOJ+++eDaDsetSuN0BynhjSBkp5UHqy0AvIJnEex90wn

CY4PcaRkProq6PFuxVoS6+C4WYxotMYxaSHm6aX4rc
7yXzL8edssrScOASaJdpyf2R3tlKls4D4HhbyTWS1uGhKgCOs+203f6ACf/H1eFxv1Ny7RrTgdlxUBTQ

57sKBZV7BfWSeNEDlN0C2/zUwmmmsNXznBMjgO6GiOFLjS+MBdMId9qfy93LgGPS4X1Ty8WBqPSUZzr2

5wPNRufsnQx8pVXPZzJth7F1tuGcxlE/+XbaKv3ZFv
yXUIk1rPfdy1NW6Rb4QWUyJmD1In+9g9u6lzybfX9S5WcVgeV3gNUkgs1dI+1GI+ik8y9d9ccClkM4E5

2QaK2x0poUoM6tb0Lbrd2/xcuq1SpD4+pDXCJW5jP4B+DRP8uYo7Kp3r83+1i+dNxYuVlDz4DocM2U7j

s8h4CpWI1Zj3oKwNi1CRBCozouKuvcGlyWZeCrFnNH
SF4rIDF24HUtuI+g8Am95GqXXh9q4xU6RwcWS40J8s1DpnVSo7uPMrJqmYLVKJnvqdbxhv0hkokF7vuE

Zu94x1ZxYE+k4qbgLUJjTAfHbPmi/EDj2rsNSDVXNvkTISVkVkyCvpFyMtiSd3H2mgKByl+NbI32FbsN

kwMjJyFo+PJuoy3R88VTaRqBASGjPVEjxofaFiu2OD
nEBGAhwsdz0JC1HDWBG4UbEWiGgI1jfuu05Tl1iK1rbEwlHA8xFAGpZ/cwn4ZTFFPnFq5REEB6y2KBWy

5/c18emrLguSrX7Cjp6tFwmEAE3xr7deDmMl3YYNfBNr1Dm4dSQHK4C5UeJ6itSvFteGxNWcZQXLAQ3d

K9ssdxdytfJJ4mna45EUKLPEQ/v6ElXQn//SPEX5x6
YXs0I9M1cQnuoUWPwzeBpDssXBx6adiPLIPNPE/uowzv3LbKYPw7k+NeVAL4q9g77IJFnM4WjPlvCgn8

5RRS2xeL6LwK66p8i8zJ8TfXS1EpUzBuzngmxGGE1tKlE5zZVRUfR+Csuqcap4gLQWhCnXy3LhU2M27I

PEducEeU4Ud6HOTHAV/oi2lqgQBKC92nZiPST3q5U4
kpDlmZodYaxKnfjF9q5IwrAzhus13jNZsdM8qioKKQpVy3VFLqF68F23IOxQ/sjIEZDnyoxkPWR71I9A

alRxcg9qXjzyUOwqvq/jRjE07dz0LzMZrVVDBDB+i6P+GVthCY0+8uYWlTO6XH1Odg3Uhy1lgc0IIyD0

ppxQE452yGjXJaoc1S5Wv9393CeIgzizhIzWh2YXrr
tlzxLzoB/6aZla61kfEqdvYsCvcfjGCt6qih7WkY+Bn60lgq635Vb5s202OF/3ZKbrB1w+tns7Ip6cxY

+Hda9bKYw2ETctAZ8BZekV+L8S+4T2qgHtB8W/vSSWnpZ27x8v/NdM9f+lPVUjxpx44ixdMv7xuYG4Ai

Sbp0jNOkSm/u1eGp4gy3OqaxVSf6XehXf+LGGqndKJ
8racPyN0J7WODhbWGrFFpwrKym1p5NanWmKrz3TmawlryAkkdik4nPZdzKistrjMdZl2ZnVqzYQCt0dJ

CcDpsTKc3+a15ZBhtMhbLkewd1HZBqOW/Sv8jI37eema7clChrp0EF/ZLqWtQWVOh9mDEv8H1O34jJaH

taI5p3xTaUsjt8lr5eim/f8PpQhpC3k/BnxLbQZkt5
pCI1ZhEhKOHZDWsTTH+45XZj8dhtkIX1p5/7B+Z24rf8mVwyRXka7GU4D+HfIhdUllptVk81xaxqdlxR

1nC5Xlq4kYCpouTwqv06VMRYesI943U7aoMN+rFjPEz3G7Dm9zOrmtVVmM3hRijXxxpMfp865J14cVk9

hZo+zdG873MwcfYXvKKA+meGaEQFRunqrX1GpBekS+
Vf6OkTzwzpLJpj6a9B4QqmC6VlVG6G2b44TExYP8J3Kh0Z8iHT9IW35sIiWvc9R694NP5mbq/Vn9adCL

LBQysrvgy6abihGTeUrCaD0hLQuTehLScSF3F7xEqCOds9U8trgD+mrBEfQ7E+cphFS3T2tDAGADV1R2

JIghuEuYOsl+CglpNkKHzmOXrYD7Fp1vJCUSGCqgUY
WZEzYzI+FQb7qJ//NwEDlaJ9MCGQVLJ6aUMGxQCk9qGCpU8/6u/M3lqBM6P+SY4YwC60AJy+yQa4E7Wn

8cce0+Dayron/XEMj50x3EZ/nUTqCTYLWv8GEzX7JmRCjqpKImAglHE9rSEr8J8NMxK6+4MF1ub+jrPwDmJ

BPSon1dC6fP+n+rW7561VguinSr914XJZVAto5izpi
Vlp+/gYAQNNxL3J/ixsvro1jorVSk/RoSKEZkqCEzNIDJsQxXRB8VvMxECnWPfXHuKXPzZ7/6X5v8x

ddMqmdp44mlURsb/2e7n2ckGAYun0xiFJLJwxn74xVQN0RpPYOUOJMs7uOOpeLpjqiHlL/hl5CjaEYHz

RCLYSQwalKko58HOuF7MS63tCzzLU1b6mydJcUs2xV
m4B0g+1KnCEJWruLQn4Ti4i+7wqLncDpDC39llZvrhnqf8FLRrLtY51pHZjyb8eu2J3J8rNiPl5ECQqh

MESonXdDEwknkGeLHVnyYmSjh87kzLp2fNMKBtGCfKYjE9mh6pZ3bP8+zlHtzEupL78wx/a32FfRNuvG

O+hs2S6eXgslub/0X+pH65Dnw3l4asFYcq763WZy40
UucGIUiXwst/j7bUD886ciTOSpTFDABkJTroGrfvuwFI58jHpF1YcoazoAXxzNLw5GFHQNwKTrhDJ7cc

JISqENE2RmCa/B7IDb5TK+Y4w+yoE3QJrpazp4X7Ami17eb02STBFtc0hfa6A0L8YomBa7vs7oLmzVg7

6Z606nDed4CDU7JLyj4Bj4phGQXBg/LVJ+9UwlGdJ+
VdvT2km1QpcnjSmDE9REIbGKTnj7oHwx8x3RF0qfVhUdP7rBJE4gZ/Ovbwl005SQQjrcBYpteh/fmdtg

GJwlppB2bxQtz5M7uEX234Kkqi64GHLbe1Jm6tQH3X7+XRA4nsxdj+fZY5qAw6TMQWfJpMzaNpP9/GsQ

jy+K638XL7KI40O0X3CkWf+4GHgDuaweCnsboYgXLB
IRAxKv9rKd+gV2Rbo/CW3YHhFOFYQOtJzm/r/1L5o+Nz5Gke3on4CZv7c+sDA8snln8KlhAe9gFbe9sf

QUPHWJhvPgIWcN7QsEe/stK2E2Ps1YcMX34vt7zj4s6aNi17x4SrgYU3YHbkRzmPCMmrG4hL46HpK41k

zLfCKJykf/eYHrA+chL6HXHHxqrmXNO3x8C/wGgZdd
UYGzek43f4ttBjJjWshifq+CQUPiVyi/R7f0rtyyGkKUBPv/oU+6reRQBld60p/ipfv9PLg98/XCacuz

BCV+K5S/xKis7F1PiNnIoNXYvWjlm0+9M6Bfl5Aj9zVtQlpJlOIq1jCtKDoGZ+FX11wS+MHTbt5r/nBE

CU1K8RMBysy19VTr8eRk2wv8hI1CHkaoLptb6lFcpt
w0vJQlbsRnhicEaPh6EcA8osEw19K3vLa34imhsm5MuK27AYRScjvvgP5R9Sn7l2qfEL3mrFWTrMPh8k

rfjt9yqK7dUCVC0YiXsDTTvR64uX85jfjKo1YUEzKjmh5yWNc+lElTWTGmsine1nFLRAd6vwsREmo8Ri

02UMzrbSWyfQ8quxktUbEME8j709pUfFZ8HZOn4dqv
w2SJ+ov5ETh+rWvTtQM+WZJjW5HdOsS+kk4ov7n1mw4jvjJgXA9fq7pe79HujahzXV631Ee2yZRLEgKh

B3xNsjeYTv6rf+rn9mRCdWw4lejZ6PWhuwNr8appVwlSx/D7/Fuos3YpTCxlIl4mBBwonIkHVAHZpxXf

d5DgwxGRMw8UqkiSS4La90gQMw+a1kBhbQ60XyhTxX
41ifS1Fky4HhERUBTIhiDLTXA1DMdxle5Fv94575FMvK6lWKar2v4RNUZKjzk/kbMZbpsULnMJaj2+tB

QJgGXC9e8rwhpBG8CUJPar1sre6eUGOpUuCPEvcdbMOo6i+QhbZp3EUzGuzbZ/AuZN/N5LSIy8D8nymb

cwbOd468XaF6vOK0NTKgdWBGXEKYsY/+qIiIi8e+uX
xmcWorkV5wNQ/rpwe+SqPglnzGC9cdTdvFQ08AJqamPm7xog3X+E+oALgOAc45hC/Wyw8yeQLiHZhU0h

CqKt9EevVXF8uXyQj2mjiGKcWQl5lhbc//jPAJdSHypOWJ7jaQnXBVj3JjiHxf6HycVZvHVzFjfCcsMY

0WD6beXwbTGtCvlk0nSeG1H9dFysiMk3I295aO9Bo4
7oxMiZNgGLQo8aSDV3dSRYohnt6fNIA+YgJ8eKlZz5qpBdYHro9AiTMk384rDvLUj/EUUG8hDughwQya

a84PARkd7/4pNT5HKIbhzqvT1zd8V3VkPlbHqMOnFLRXGepZ5vuJ3KwwVrTrXJ5fstpoggLrp34ZENnz

bzs7IQYp/Nqf5p+J9YcJduFwdSeF/J8gHSJ5EZi/az
OTwGcAkf0TXuoqPnYCipXgNeG33f1UfxM8TJjZPypfArmjEChrm34VstvzEMOR6s4/dh4YDxVUV9sZaY

SSyC2mXeGBVIrhIGsWhMupSOr+rC+T8JHjgZkhSde9Btl2XqnJCs8f7X5fS5ttb6zsA+EBhLtMB1BPEj

qJxbnyswYCodpRjmDh/ngbr+yFqug4EGA/kNbTkI5s
C75iAcLq7Kja7gMsMwETErme9Lcxlut4TaiPJtI7u2jdwmta/LCavT+w/Sj0ODLAG/CqmkOeTmsl0QbW

iJu3Zc8xAE1nCiOzbov44s+b8ps3qAinT1C8qPIPZa5MxIc8KMpUdxZRc5AslZrhnO9I/glOLm3zd32x

3E2ixZ7cxLBujglz/x/QX3cBh2fMxFUX35jQJfK3qO
e3GggT7hLN3XA4ivmcG23Zh1B9fWo/uDqRx6FuD+FVWoinHV3XtDdeOHlvaj2s57Ql4cvv/a5OEWkpwN

TiYSmnFCGMd83qm42i3RuowkoRBvOtCvNdvCVx5TMsuW0QlWMZKGQ1m5kL/TvrVGNcKkwRPXgSTgZE/a

qqOMcvToWL+t+wlHCUctR/benJZHBb6dnBzksaTumA
VYobvuOcGKNzHfdF8CO+z7zDC5z0zPZdHsqGE7FkctMxvfnTQI8ukCKfg+WLiRT7yg10tut/48x2OpY2

9Xkl4wyeWzZ1bz9O6hTKTpOHtXY/tD0RIW5cMU6im9yTgR7bfeEn96lX0KWbI/wQF8FvsevQzEbURjNL

tjLYZIJKSuPLHicsSF44QdOArUk9X/uZ96WT+ikwYn
kaIbSpPpzNe/HCKEB6ayRg2U/UORj+8rbpGe9w/4tSdATqgZPDort6zvj3ufsduhH58QqZBEBZcdBnWk

8xXKB0Qjwg6+8NjyXVEH/tlFVqY8dCO+lktwiX+Y761sN+/ws9ozX0gGbtRiGu201I9/qKkYuBdxmXQO

bAIwgfxCbig1Nv5YLXxquJpvYZKD+MBEAyQe+Gf6qx
MM1GmZ64hExZ8kOcGtwCrbgNjNyq2MOfGVj3Eu+nUf4gOk/Kf4tetjgUjY+Z58korOgUTd6B+Ah6QHOD

pJpFxReKYn5wwlfEqwRK0vVrdTT/v3ni4f/Af+iigJqUEO4wMfk/9t/UpVQLxPU/DoG9S1FMa8/ABzw4

X/8PD8R+ziEFFDZhlDRbGszSm2IwPbpSE0TkyAFlX7
/SDI46RYIsgzH+/SJl6K1X2cIE/Myh8QL08furcjX9jzg2a7JIMsoMhMK/kEVaQclBMXZbCGRwKLPN1X

kEQ/8SbRsPPTWVnGWwNl+TrDfkRBmvHrNbbQONGIMlsSlRrqr5h6+1UosmK2FE/H6ih4pJ6Jt9GWSYKX

sFLu1VfxPSiwQDKkjYSR0Rb8IXn28/TXNrZu4pIcAy
EvCVNIjnmcFRaD6h7JEB5GWmaLIRxB6YHf7xUvz/l4baEvChRfaPZJ353WjJWPEITR/ct17elW2NlEII

/jHrJgmdKnS9r+D2f2+35n7rZGK9pyYukqq1Ymx01RqfhauZ0b0qwUHHO8wUNZqLyXhN+1X/F9Q1T5/q

X9Tm3Uc/n1+U+e8Lh2LHSy6wwZE/Chi+uouIK+gXRNx
UgFVyMJ5ADkjjwCBR0MR3jbVS7L8OYCNDDFXdAbm7W3cSL109uXKQqfmgtf49b1uh8R9bq1icEE0+Hf3

X3uv7jf/baq+csFbMz9AzGaDBx/cHqnVN+nBkbkyndmCSsTE6W4xe6VVbptO1tzmg+CA0hiH8kGgpjl1

lwreJg3gLm9NzprHPa9onQoea7bpU7h9PKC7JvERhw
FYUAdmGLckH/d5asqr5OIwURJo5kBkrLKcojfFZJbfJTeHgYja1BGBOHKvuGZALz14QtPizUkWKtwuBG

H7uhYagx/hFEn0hQQcFENkA+j2WOv/ATABgjH/bZ1Nv55w0L1IhGvsDOrCRTA7ZeHvqi3HOnyYdCISMG

2WfUUnc/QziRc2GEA3NBN+bPUIXmhuIW+VJ3ZjwM60
4Z2HGmO6M1QP4hnEYya0N9q0sjICJmmW8yRdc9O1hRfBkfi0VZTlYS24Xt7IhSZ/BK8GDyhvSFf+/NAQ

6yR966cXuYnsBi6dMyjosjk6DFSXHbzAM4NzGieQrBza0MiwxWGhdoKkZ03aTiDyHfXfvWlNikF33h5M

W537OKjPwrNfAmq+dme6DXK+lfI603OMhibp3MkBEF
FIZtpVaT+pqoIKIxAGxHmu3qVGdH/69vCEaEsFyex49yfesLLJN6jdgaJchQ/hJdLT4zVzZdqe3w+Ksx

1I2yDMVm8TQt6NfUBFkZ25K0CIcJr+JJmYd7iL+qbGky8AQ786MkN7O7BkFbkOwxO9Pg5k6E4FGQooNS

Zu+2ly0mYgKX35pxz7+AKPIcjyRhqwhtUsPmwFdAk4
8eQTUsRtquecWOG5kNzhNPGuaztmSafFOcn9OQxtk1/dkGW7FLjutezKDyrxjMnwYfyfBvZlZ/PdRP/+

UUtKxTz5IACapc+zvk4bwfO1uMac1mMrIJ+Ogi1UiHfk76N5mizACQ6Eo4f8KSw0I73PDhUFjtkVCgPb

c91Aia7qCSkV+7hYiYHdZm3xY2SVxFKHcY9y73Fjj9
i8IkgPiSRKkmZJmAsMATiriR1zRUyUdmmlG2vEFeeczevSW/taxQMjzMI1Xqy4jPJ2LAt4/r56Dmzxpa

73mNu++OWVwg93xCLQMm+BvMOzkWykQTgl+sVD7jiqGpAqONXJGvnUN+r904CGu7O8oRJ1mPLapOKiYW

suVnppGsw7iZvL5s2Kv2+dfYDilhPqO1klwulLLkbd
JhsSOJVH7VyPrc3CfY827Ejzg69xIj7OPJIxbZzaZJmJpkUHrCWmGDM+kT2g83eh7SI25Ew+t8bsSk9v

IlDIpW8jY1w3PXAhnHxZTKIp0qK+X9vS09bKs6wGOuVDWCXhLsh4/TSUCMqv/sq7b8Px6s5eGhuY7CFb

rDnPGeZ0EdP8xLeDnq1vNxdg5Pq5KC8LvnM4+Vc9G7
jG5zAdZvStlSv6fWT94G8toswIBgAW1BK1ryoklzQwtKTRoYb18Ky5V4flAhsylQF4zSlPI3SGc6bp5i

4LmGFeV0jmKa4wN+paBRNQ0w3AK1nkqpjK/eWLL0A1dNT4ocB4c30LowRRmwhAIc9ga9xcS+wvNYS6CD

usPop6r50uUfY/lM+X/IBqClOZQY52P2TdhHqYBQyz
XJocKpPwURkmoqBPGPd7pp8iaCbcd0WgRDCxAUbsUFj79YDOZtfu6TMP4gcYrGWHVubvePfjsRG+g+AX

WkUfA4qEmh7U63k4QdZVo65jZyWTOAgbfHmsJer34V0vfB12k++LCS9qLo+mCIW1deagI1BEpruCWESb

QGLYn0ZWYOk0ggK1w/NNVrWDtzComPlo8ubYwzRF8x
rNkJukS3+N1OHv6i5gK/irOL3mi1DVutqg32eC45OwnBLMrW98KW9fdWaGTwOydEXCFkCQlzPUvdXmBP

w4ebcNPy96ydJhIUYHX8Tgh1251uEJM3h1q/m8wtlvs7MYRQIjyP4pZgCoVBvlLnmeYy4Ts8K6m62yW1

qXh2RrYPztaYbDxPjwGpHZz8oAvgCfwJIWzKz2TO0Q
7uebgLayaB7I3lcXAGek2I/BRuk0O/n5lXgHhRobv+B2ACVJM4CyxC9YgHwr3iezyRrCXGazh8Qr58FX

QOR3rFXUYOWx11REekD0ZRQCpgOgF/ddKW+6pT+c1FtEsDryBvNbFDirmlYhK/nWNhKG5mEtmdVIXSZz

GibwWkaddZJixcTEM2uq5xwGSbJky/5UWZWBfZrMn3
KnSgrtEdqkfTraUnJ5z9X+kk5AuVrhd+JnOkQjhms+LBCAyd0KYOVFz9jk7142ethxc9KVajtKd56mJi

9w2nwz0mkMm/Hnn36THcQVxtKZOcux0eFMweODztL0+giHxB1BquWaRceemPOkn/wKee2F0c6ymRvtfb

K5Tm7ag1CXfZ6Mq0MtSpYjqFL8o6qQNibPvzkLP9hG
qz7P7DRSmpY7V/PNFAQihtCn0wY2gzLKsy9SDmMh2UlepjgTbK4iKeg7t+JcgoDZl1MpcpUH8zTJe8yb

Bi8NWRvdO9lWu2cFXw9YdArJkqdHDsvjZg/+24h2VIqyKpDXOs9pZD33DN7gHVPLP1v+PxXRlVbGqbk3

5SMmsf5VDe74qpLcsEMFjt3TrVKIYsw48I6fKHAdm7
fjbalfl2g1iEYO8g8PIhDlNiqA1J13MobF/EODXHEzuKlAoOXJj0lKAmFtFRlNfr/l6qDKt/Ojkkd7iF

+gSsz0h9U24Vr2lSGPAGW/SOZh7scxBEooeZesL8YSMXXWAtiPLDH1EGIxXtglKWccmJBDH2+zebx5UC

/WQl3fIoslJpEokLevN4nFcuuxHbynvxxMCac3wT8n
AKGWNtuFAHzZApL3eGG5E0sYmaOl3ndre8fn7QmMnPoF7mUEB87+VX50KRg9SLantus+JlZOeHE0EUZw

FHMJoothzvOBdTWKqFN2HOA742Zqa5lIaree5RXr0XiUOIz0lT/Qt5LVW+QTgWdc7wsCNuEfr75MS2lG

Lv9RbCaBsv7XqoUgMizowvQj9s+6HjXvh5hmmrGE72
IRvaX8wFdsZ+5HjUM6RKqca9Lfa4tW2mIRgzFRdRWP/qJPs7iUMewhB1D+9kcO7ouIpJc+zwyOW8PLRH

vnnyABGR2WYxKmMUHySu7VvlqUladgUXiKERTM8h0/Qfgxhw6oevnxCvoDY/QJNcllV1Ev/Augl9dT5k

ydo9hZF1uNDZPC0gXtE1PkIsF7az555whn7apL6tCl
C+Ha0tto/qteZvqGk78+1odpju51ViY3G2175zSynbbV62trjlBizjROu4lG0EgFH9X8XEkbT2Q+ckxB

1frwmaxMm/wUxnNdjn2oE2LPOu6sQn09UAStoogCk7O0xxEYCod4Nf2x86zg8X3Px88WWG5FQZ+G3d2J

HuBdKPYhE7PRAyZ+/bmsDIEIc57MtcTW+2aNVH94OS
Rmg+DUC2L5v7ifZVPDo/D80kFccIuwIWQQeCGfZ/nixyDL6edIiT6v8xEuwpyxwePdKmFjqwoHCgvzPU

7cEuFaolOjgQ3TiVamx+UYRYT/o85wRSijj73DVk3BBON9xBFIJb2f8jU/cbJN7iEPZ8fz/dFM4cmvwj

EtEtf1h3Fa2l+V47MUGGJeYUq8N1LQ38Fua/tHhGpQ
Ii+zHlJsEJEbCj6vfZgj18PeRVjfULBE2p/u/DowMSFB6bfUV7H62xt/kY7xqcJ/3aral1FD67vcv9w0

Vc3v8/d0/4n/EiBDi4oroLXzzPGBFdD1ocAzh7IgXxJz7Y2ZK/cT8jSnz3ftpxITMGehl8tsC+DMiC//

G+tjtnnoSONDJt6r+4PT//t+5v6jsklAr+CWQHl+ER
hrVIewf8nAeSdG06al/4jTW/2Ekuk6936NgIrB67BvHsjnc6R1yTe9dyE5tpw1KpgxcVxDnMwDS7mVu+

AkTk/g39/u+ouZchP4LJrgx/F4BvO0eAfKTh5t5QgTNs4NHXNJ2v7WqaIfH2ND73stX6rIcZjUKaA5HQ

yKDofMvFz9In+bR67TiF+J2hl45w/5DyRUjO7+4HnM
2mcq2wCc349nK2WewdosJw8xB+p3I9Qp3ab5Ru8PRrKHM8Q2cQlujxX4qupf+FZu5AnpbwSKq0GHCL/g

MLLY8U0Kq+jcX+BrkUBciju8mu13i9acCglUbqnrnoyIIZt8N4wLEDcyMazzguVwmKcwBrONNt4aFgnp

s8VYPlCqkmtMLVqFNMvraQDb1NMDzSni9GVv5E2FU/
CRl/E97+14TStkpBeHWxe+IsljT18mdh0jv3otESeYT3+DC1OK+AT+ta/60s/q+NEzwVoaMaZRp0t8z+

ow6t9IVcewjjUfzGs26LUreV1WfkdfKfe402xy7bdQzXzaditu7FI5Cd3Jnfc+x7yg8p/3T3/xor/tdY

61sm9E689Q4UHmuY0f58fbTvz/77u7Q9iKbBELjq4N
8ficG/pDNy0wimUBSZXku3M7WAWzOHMWLuOmQ1xk2ciX31ZoMf4NeSg/cQFYIlSI6nmI4iDE3Lw2NIwD

wpxQ0lb69Tp3IsOflqafpeEWp4SqCpSRny3grJWdPCzW+KP+s4234ltY1HcblzuaTDg9CtbKW2qC4/cC

LFKsmQz+cAHsxB3RxJ6uj3S5697S8xPYlnJw0QZGJI
/udSLHoc/prsUsEMFQtIgzL9ZKU4bxXJtSO8As7/jDB/48Bu6KD3Wnd0ObHE5pFC8ezhg+B6UiDk+ZKc

DH3HzY6QsOqZlfriTnQ8k1P0f3c7B3MqkbLyeDQXwj5AGbrYLcZLWZx5JBQaYumejdXaGhQEScdjy+bm

DvU1+dinHzDF9T6MdwR8RuHBihxhA2KdQnd5yvZPDR
ML1Oy9M8KcmUaKmzU4oFeKFswdu5BO6+vYaDpMiTZLvA85MzKYjK3sb5pkm0qkceDBwPYFI9tYBNLFAl

t/OR9WIN4nlONfSWRrfeJik6Kw3wSFZPvVBrD0ry5srSR1DGIw19dupdzjHLg/qP0SoNzOtvsKdfLq/G

LvA9JmjP7pIPOc+B5GJTLR/QRYb+gDz6s4W6yaxJ0X
8d+/DhvcZf46R8mA+VUaH49WWoVY/ZXV1ex4BF7/A9HBZKT23wm2Mgswd4b/otpZgVxiQI3yTeLsCeJd

2hAfjt/2EHuMLh9U8cq36tjqb+QyY8Wy2O5rkb2RNMxhUJbtmUxbzflvYxwc6+dF/02LR0mAKEfxf974

0CVaaWbp3oooNvWR2Bgj2A0l41TxjPSxzwzn0U037/
yclJPLS1S46FfTehFFJ1TBjo/syz/J2aUruAMYTdLAqNEw2xaMcczu03+VkrhaXruMCyZHipnuVq5rkd

FhsxxdBZArM8K4/1/Ibekw0R7ujCxF9gGikMWODkwIBBkHrNhz+G+k1a4Bz+P/BHUoKo8NgjbsatXTpm

p0poZX+T4KNWefZMOgOgh1DbBguU66Zxcxt3Nna/oz
ujoCmCrVn5/rRLb6PhQFnjR+EAvSfYhriueKEwcaAQ7qWLMldiV46/64ZiUgAl4QQuc0cJS/s8PD01pP

XbDrDBmsymFYm5wMxYwjUpNBMW4Z791tjQ2HrhiM7Y5ZITSBRhWFZfQOB7bTuf1iz/b8PZQfK7AAjLV1

neCyQuGxbPzi3C/LrbcAr684wqW7xlgpkhMeNs62m0
0DVEjEZRweol4sbAaQsmNUmIkFHc7zhIMSgVX+mKQRtjaykEeQKNE+P3Suy2GsjTv21p4yvp4xt8oawI

swjzi/PpczQ/NXhlt5a1OKxbYKK5OfsalhYz8dxNxthsdwVlhGe5HAm9Zo9hjTuuH0LHu9RI0aZQ3F9R

P1kNhn89/utDYj5VFqHXgCQVfclAf9GlajAzjkxMhe
rKO+IIIygsLVj7aKy1wXKC7n0Majq0CnOR0sm4zYYIrO6Jfrsi1mIMW9BifMC19fPWL9iVi2ehVZaC5n

BnDNBmJE1aKs2pph3lUHy5mq6rf+MaLqFgmGVPfmJ3TJyHccR2wG2taEbyssOhveAoczSTBBjJltwEjv

g6GI09MjGE0EM25DCC98XUmmXtnHMAy1WKUjaRahlA
KD+e80o+X+SuHKb82Rm5qZztWmyaajj7nLXwSd46n4fvF+MCjrpf6w20HS7eU8B584SQD7zC0XeXhgDz

yo2uTKCWQuEFbcIpcXIFx7TLms2WlWDBYOJJQ4VKD4gVYIF9kz6M5w9qeA37+5nlcaIhwmsdtOn484dG

Onqgdrb3bkMAnPt2k1fbPSgJQCZbUnP4DkpV7AT6SG
28nfLyCAsW3kDh9JD8laDL/AFg1eJuKcIvxma2zf8+G1HVW1t36uv+p0jL0zm3sVozCYYYc1JiPpyV6P

oG9EzJ/oWxmcRURaDpbIj9rI8XjBjaVWL2937QMH5AC4Hu1spibDbpi3TgbJGnIOAQF9maUzYl/dLGAV

pd2OAV1k78vKzB3Pa70pbYDmlc8PSNkwF/atbgRNfN
0XnWeRd3TUfnqV/iMyD0LXqGpeZWTqPbpNtuqoXhRrg3QqMhdT4iebliI7OX2eX6X59oWkXjB1WoH3Vw

f4lBZFWV/mit9xoIgz9lAFubU8kGn7tqoaz4iTViKCvqW67jVjFPkM3MpLMDOUwzSlT+N+8C1vkdL0Kv

nEX8bzCqMGDXMqUBRvmrlVS5w4wiASyLeZKP+uCmII
GN8Uj3O6WRrK1g8oQzI69xyRyFgfLvZR5pZR9qpHDAkKYcL6kc4P/RfmSkONLbp1NEcFLXF2L5qq6FQY

e2nusxZUG46amp42MHyr7dt/aoUFKCNNrmWUxxRq3Mh7qOAor58qtxRCD7i639ElrpfEK20048KMwzs7

EciZ72fbbyWr4qR6gubXgw8vrP8Q7FBlFO1hJ0sKhM
ixMxZrxLfm7cidCyp6KMvlZkO7Azra6qqPGlhU9AnGRcf0SIK8ukePFSW0R9RTDN4RbpUs8DmKzfZkcC

6H+Ri4WVaeZjChYb2jBUSzSzLAhrStUXtOfes4bZvgW/z6ypXP1Gfx4pwarbfzpF5DQlv/R0PAztabAL

v3nsce+KJspGYwTwneXUkzPvUfygFSpVTRT0i1Mbii
Eg7ic79waNnljrHXB4eagDxPxw9sDdWpAn6yWvcLf2e+3ecw5HacNJnBJPah0+e7dy8mBW4DC4+q3ajB

ntdH3wHpxje/FdoUOt59AM7s8MaYQP73RxfpOxdKQOdzdhkCM6z/RcZIRRvdI0IztlL9TL16icazBxRr

6UlFSodQCNF/YqyHfdt8P0OoQp24xv65qVZ3TdANcj
GKrfIa1zrbC5Unc0lbRk0DS0tWC6Btlk5kRmoQ9U7CpwknZL9CZvcZy/enL24OSdNzjT1mo9k+drkbtW

H7YKK1PA+wPYSqdgKb+WVqxXDOpsFwT2FKUB2HGdYGigcoyuDJmsQx4qrO3CoaEFJs6uL447Z0rZE1ae

gWdmWujIlubNdjeoV9nJOC4YIeRn/P7blxdfaimmCe
eSQM3sOk14UCIKIglZEUBn9dxrUnh2SGiypsNxtyBEzP85Cyc7PaQrsAeNSe8zSg3HzFIH/ehQbx5A8g

UWDIrDDayfR8nWUWvzy+XepY0/PSfH7Yl7UD7PtKATr+5/KHGG8nwA/mBwLprXSa1v5pwzI6wj/eRrg2

DGTbjKXIpg48IyvsZk6ntFtOaR5w5d1KIeygA1HspW
3RU6EKH+ULI95ABbPNA5xjy2GK8O/iCtfXPf6SMyJha4LJ/tFRWDEasQOqnGFgf6pWKT+PNujmgE7ZDR

na9qTaFNulPHKvpLk8LAr2kCYPZUwxubvUIr2y59CTUOpuBraMugyvW1GTGga5Q5Yf9e+p+UTb61QDa2

wptNRYut9H2FddKm/qU4/BvMA9vfBdQ9ex8kyME8iH
B76mJQkVnLHYAKxbB8z3+QHexkU2ddqFXNomRfbstWpO0CRT7o1pjudgph++z3qvHzk2Yzv/UqLqu1lW

Ws8i5Y09gRD+1vhY3dWxvP8GZ4x5CFwE80/rj79zWE+hSgIuVqflM3F2ZWTP3fdInqNgVMx+1ESbewKY

sjQTzZJTbRx3cWusGvOos7amW8VhnSrgHlMeAcjd1z
VO79EiwxIPqPfye44taMJemYRs+0Yv6Vbg8IBr4vLsrKYLNR5xw4Z1RTB/sxiQxp2CYm+nGWqyNDmcr8

ZzsqpemoIv9ypo3ybgypaK+Cm3JAnZoYnb3TiLdPEsvpCy90ID7JfJ8i0X7jmNDEExzYja665cl94hjY

X5/AsqSWJo19mggRVNzDX9PDQw4fbzL3zFOYlFUcXL
8Vg1oah18N/QkrsvbbF5bUqcgCX/ZqliigOdcplikOdbGfoFpezbH2mxptH5ob5P62TxafQJGfu9vquh

2D9WGjywzvEJjKp6ikOd10I9ZflEq87Llhim/A7V/JYnBcFOCi+zCR754mj/yHcc+jbmYLbLpAolMhhd

7HWJR0Q9JxSxj8LeG9blHhY3FjVjbueb9s66ZBUFaK
jDiTTrQp4OZXb592iy/QG8w8guz9ZBHEzlCSDnHXBTyTtAuFbNeELguN8j6jzTVODfd+JcMjFeBLznhR

ks1L/U5XUx56AJAfM4lXp6S4MRuPJ0lt3/fo90iK/7h6Jry07zcS/Ne7orj/xKGPpDWK2BP4erCoYr24

AvfSPwXjWY0DngwFJNv8YPL9i5aMLU7Y9boyYfxRyo
1xPUNOIqdYnNVIZJTT0RdbCjCtLVMgLkTZjF/gLC4prGx3nnniTLXR3cS8YCJrXo1FYGevRnHjF2y9Xw

6brJUWwbHYe8RVqxmDO3j5waHnKB77gvdOTVFbSh1RMILLujDO5NJUW0HTMeCok/WTrpaYhy+kOppU9Z

Onpj6rcGHSe7sEslI/0G3vK1QyYZkpORb9pbQwIHAV
/Kylz6Fid4lcdeCgLf+JZ1n6Q66otg0OtpV18o0SsLQLv37Y621PZF+lnVI7PvrVZd/5X6Yl53Plxj9y

oQpJysWZDnNzXst6kb9klD9baBG/NWZYP9KH2adWQeFsUx09f23aPQ/YNGmg2BW5Rsn105Jp3ihdG4Ym

jdziSlRjjKN7V/oMRwyAziZlnoAwj3G13nai9rmFhf
CKVolyahHGx1WJ+QexJQja8i5TyxkFofc0FMAvLNR9vrQ/JJHCj8XeTlqT/K07rdwnG01tatgfWqI/PH

f/Ow3Y8THO6IWeG0ZCLzCxjzmKL1ORhqMmr1m+H75aGh3jrbwMy6KKVg1gYTswthBW4/0sxD0ijNeJMt

UJuqngGYKPRnQmRgoTz9C8dMZTyCqgj34j37k6VsUS
+b9Gwyk7nYqTx07U9TSImmONF41m46YyUpSuntDp2QwVBKqDUAPpcr1oj22CVCiE2ckNlxzLnTYvs02w

r7YGbDbtUuGTcYU4sKAGUschr2hOC2TQ+OpqiI8Jqv8ZHNrtN2cDxraQ0XqrFDTpyZXTSjkNL0v0ABXL

BdtyEZsIDhy5UkF/Ma+I9HFTnVSlCDw5DgWHdZapf5
6qmkpEj2lzYoLePJE6jAb30tL9m0+b5z+l5ub2iBXCihOi6Dfjfj52V7iomEsUBHy8QJWIeN00DMNMkG

NQ6RnF+S5bqbdnsp0swSGqXzs8qrIK5aiLdiyd1PNMFrlXUdPs4OxdFyHS9qTBl/Hq+JfRv81Eh8UQNI

MkUY128LcI9r1Va6JqxN9u09HS16PO/FLV9Yfg8NHO
goCLyw6AVf/YM7SKMKNl4gUB/kDur6bkXqg2mTF8NpRmf3XMZfcU0ymfeWJbbboTYvlBm+ipKn3unk21

g0FrhDoZhundKiY11225FCJNmXnqIE2lrrLBvR6Tw3sUd5e6CA2NpulNiiyafy5gYxQ3wmH+dKIxMvSD

4g+2fQY92fIy4zD6R6xN6f4+P99OxnMVmWZrteEmKS
3h90Cc2OClUecGlB+A1u1wp0mLy74tuzK8YPi5Jn0ksKQvDEbtTzxBModj8rUyzg3qNkwQw7Ld5DWwPa

R0NvIwbV8bIET6IbeLMsdd109p3njJzWswvBO3HnvbjoIzdJsGMbk9FQEUwTHcfuER5t+zZGYWjO8pk8

XJkQ0Th92QqPQutZcyBF1/Y7RhH+2KiD1OjbMTQdib
eju30eNqkctMhwEA81L8+rWv0qbZjWkHs76FjS+rJEh53TOwlsVy3ls/NjuiQ0pjn7tw97//ut+GmK97

oWOfuiNI+MprwIODaZJYR809hzpSsjx5HRasjKFqp2J6CkIKXyyv7yHvhpglQ+2Ou7Dbd4NmJzq4N39e

xlcv7r1U6c5aX5y++TGhDIKUUxF6So3ABB/lzVk35K
Xd8/WUbxBM41h+dVlHp8yUF/nwBY8O/exRXByYMvByF6gXQbAQWQ06xjkz8a5MepgICwthFphY2Ypcsy

rMbu9amHV+1wFvQjXRmGd8FLNwpVhSBJ1PNkR/dpVtjR9v3jfmY6ywzkHZ0y0e7jahuMjMbduH5R/Dewey

SYIALl2IgB3DoxS+C3qWzJfwecaYjQ5KIBFee19Kdb
gEVC/z89+XNkwDG7xfOrjJf8wBjhRVSo3aZ8i8YtmKgqxd/cIkrMIyjIfy89xhbnd/e6Zi0dbld6SOie

DYAcMEMk3XnV0Hm1oyBtG5VXSmoI6lobRr1WOMwI16/X7bkMXIiT8nMVrd4eNaQFMP1d7VDLo9aYEVID

5dD3Py7GLDyyyasaHcJ9pD7MXRiajIkY3j5Z50sOXi
JKqTt15zDPS9plUedDim1CduhP5B9l8IQ9nzJEqeqV42U6EKdYojd3IfRb3R5+ft+WWk7vX27FpfGqAS

GbuwSmeIuyF+LWaKnDlRapEdULS7o4kz34XMbcxEUtfYb3Mkkh89DyeR0FEXOJjvkgGkjUR08thvSZEp

pp6a5y2jUZeNs7NHp9V9bdKYskcL+/O3D+niwslfMd
3M3txWjBgDdRPiRVg5PdYVBgtgaM8KfQAM1jUP5Kl6XLrG6EPg/ESh65BXZSazX9MnqQHWKTihjSLnOr

hFQbvIfdFct52vf09tCqLyeWVh0ZEsFV/xhnWYzBqT/EyGLiAxZoDU0+keaf7SOPsGV73Rzz65xaxO06

jRr8Z3X5Fuyyxzv7BTIoYWktaa3zovCjN3BZXThiv2
msQKN6Fv8DdH2kQGDhxj4TiK8y4MGkzVqdcGFMtsDaprS5o5Ikfw7AGGFk2jl9A7jZVsP1+8zuiMYSMR

SYsEFaq/qeEUN8oWRzWQfMfX7WJXCZtpGZWAqH1YPVGT0+rCHa8sgQxCEFzqpqgUQXvmb4A95TDQGpGQ

6IbKkP8XVRF/5Tav426zq00AaUp6bYW7duG9v/XR29
2bAn0S453efJclzqX4/Luq3GP1JFHexkofdwOXp80o9Mozu4NK64CbkHO524nUmrj8WmVuOy2VPrA17v

44DAqjFlt2WsrJ+xV75CMhSPgbWkYkmzKKaNCJRfXZX28u/uX3NRml0SwOEB28/pa7Z6Bk9kA35w4sBX

hbaC0j7xONVriZSNvZ2c7I0gIjbMR8nYPZraNObbMc
flOsxS7JcbuJLOUbHlCQf8F7nXexEYAmTfR19akrw9sfkt7J/6P8t6JCbRrSln1P5ugYa41CLYt0lzDh

fvnrTfzDMLNvUpp2aI+CcFFsaobB2INtlEgF2JLZROafi8I+vVLM2dsDt48WAjqKIDT2x3MweEHdbimH

XAvu9cekzsqNFt75moUup+kpUh1zXM8WWzhIjxemo1
sr8kD9TRBWMqVat3sAMLoefYxlxjg+Vs+aaTUcInc3Nty7UrUS+Wmyyh+dGu69QCzM/J048AHflDpBO7

wqVcVuLuuolsCC7gmVixn4rPHTwlrMQwJeokWuOFPU5JymNEx4yDLmcWAlamT+oi8x07bEzoiYMtRgeS

Nmz99iooISVVWCnNEGNxJPfPq8xwdy4K4mY37Xpcsq
gdg4JWDntGtwm61tkqbiv93WNUwHb4h91meDaSwKFNRTsppv+Gb/pOy59/X1m1Ti/kwtsVkj5zy975Cg

Z4vp8E2uLp/9DcDMnLTtqXR1DDh2lus1XgiRIQ6DUpMLfoQqAfT1WVCPRMFV87qV7QUeM75F7aXdIsGL

pNt5x+v47Gekja1M2sQ6DWVe6mcEtdHBeMBJFfFIjj
R1qtoyg1ZOXIAfExi/9YUh2T2OwRDqpFF2LQUlJq6lJgApwevS+4GDNq8v29i3izC2d33mDJVO9xQjLF

l7oe0gARnfqKmL/IYpnh8+vo7sQ9XhZ3PBeH40pRqiRWhGd0doPLBX+vNIK9IzFGjcdJUMsHjfm5g95R

5OME0/JdJ9CjRvIBu9xUjr11lvPOgSIzm1dzcvS0HW
ZmoKVTcZOQoUgtcErrk7cXMu70OO5NDTBgb/609BfT/gpWhwnyvSIWIZoU7ccsASk6ZRizYEerJTFu/9

hMwvVSn8/v8ob+War+xMNn7dGbjpe1onlqTTseFP0zfW4J59lXVuQrPg+87nPH291ou+8o4iusLB0htZ

uMzmDnPo6SdyRiHeZP7TEGSIhwmeKEnR0PZuYnxc6Z
mOb5CgP2wY1s6D+E5oAgVfUXQ6HBZYNOsznpog3DZiNctP+QneQm2e3FeIFtcr7x/TKudoPxjPPV9GYC

JKQbpBQAiEHfMn7jczJN/lR06kk6koU0oBCAuDPGlkSaQ85+EqGfyjgvRcAxIMwSjgL8CW/DgZejEaeB

/AbfepUEZyBFHVbNoNy8mpF/7+gwpr3cday0A109Rr
ZBDcLuzoXDw9eUU0BLcWhpR9qCV/CEoe3TgerzUgN/uVKAvfo6KmhVz64hTeQUfltpG4JUsrto0cTe9F

t4NKB9TN1G+OvlaG3/wW/tU03qa0Wo9ZiPB0LXYIdf94tJBWHNfR8awrFdo7kGnrFX9BDoU3tKvnCfv+

lzsAzhLYbatiFL1a+xVIqVVN71Er3ytJT2mtuU1JKd
194vzP7Pud9wWBWvhzE4UsxDw9usW94p/sd/hGaZ6jZmVDcxMrs+jCEiYmDP7+/MBnhf224kEkrHbn/Y

f4HiBTKGA8/aoU8jwhYI2ngbCXxxy0jf5ygdQag/MuOh8026uz42Qt2+r/AL3cQyUn9i4pv5lo/Rp34v

a8eVyTwiAnAo9qJxbr+KWMJEnyoC5v+4q1mldGPFEs
rLv8VEdBwvvA6JIouWc0gbz7ZXXqFcexZ1W8TtYRWHgy0ca7RQZFChhOp3ADs4w2aqS++9ic8uI2pnUz

5+KM5jHDmmANwEzxnatNo6+m1qbS2dMiypIK/WM6FUTb8wui0ropBVo8ljM7UolOijdpyZA2IbNawVw2

lQnXcNqZDF3yJN3hFHOWyWjvBBFaTfZ3FmjkKqSiVV
WYTqeROjxiQDC5Agy7LtUMPSZiD3cHenDQHV+ZGL2UJXmX7NGCPKe810q8cDCM1MNBCsqy85wwllwto2

UvEgbVLOy6K4a3H57bpKJ0OuBNp/g3TrvjBG7MDTrqDF8+L7yo9IN0oNsN1ImLYAuf2m43kvQHvxnH4G

MXahz9K9tV5wOvl+GZP6vJWdiXvRmwGVxVknCfnT/W
355InEpFnEebUL9PuOOY9wAb6RaNh6nh76DHNNRzhBne9c+B2mKB4BaGfzRr91DvQxsojMDH6yZTeD25

93O6S6VF4Xa8/hXbd4/q4W2ZGT+69PdJHA6cIdHAz0Zx6Pjk5m37jlOWM210oEE2eb4f6ogRG4Vq2gdH

7tmu9TiX6HSLQhjKwaI7pBxnRWrOAK7YoHaNaBScnh
5KODnmzKUKqrwNa/bZJ5pYJXUZNWqIgdvxTDR77hlFjv8LE6N9vzm5kfbKmVuvrz8yLK7zCDJH8Xz3tq

1h1HV6eN5KJKwCMoM0shMZT3f0B8RAmIADG48P1+LVeQrjlqM1j9JXvRdfOylN9tcxg2noKffgSK8mdv

vxRDswxy9eGRO8aKicL78CaaVNuZXJR3htvL+VDS3/
N0XCWcttY0Et4bNzjRHenjgCL42IrkPBhPoZB5yUHbVo9+W4UG3D7iEKr813GgNYNXQ1JnSdKVzs7c9C

TTjWvDSeFi30Z4/R4fiq81x8lGh07rZbHhmms4OtLbA/e6ptprLk56yX/YKy662W+NCIxIRJdLRDVBqN

k3EJNJqnIy1HhMhcqmM3kbC4Pc+XlGoi5jHWYtMuz8
phPJ3bsvc8oWIQ+6xPPgZPdLo71HzESHSKvRX+egkKrnnkO1KbCODjdVpG+EUvAashepQe6oQNqwHpsX

x5jW2vtD4XtYhkMIp1ySK4skPKwQk8OJvd1fhxJgnF98DHQCOTVPmS1KICIcRVkMZLwdnomRhevfU5nu

9+fNni2deGVBPMrDeFgjtqRe2B3g2M1CFzxa7xvl1Z
O5T0pHXAUBr3fCcXUcnmduf94ITSXwKGnOSCwSAnfzhgrMol8oUX/NN0VvgGI67wvzzb+qbuCh4IEMn8

m4CkW90gD/twcO6EsUEAgK8K9Op2kix/DsEacMrc4RGZRzQHxPuj+jmoeMZMF4z+mJXig5Bb736JfFkE

B2Fr4KiZ+eVcrNkU14rDEjPLBdhTqT44iExDAGuvaH
IoJ71BrY+7y+i9IluOyBR5t/r14aTzVmA4wn4WpWC0P39mrQW24+Y76CJNdOGBwMrrAMNlBqeoCyvKG/

/FnSGBvv2v+uwUAX1KbVmD4Ecqwdqcv7cPx8W35hiR439RVCNZdktqmKcb1ano0gQl0MTh1d1vn4iL3o

Znvitcu9rNaZdKkVHIc3ZlvHiqmw01Mr7T6sStaDW5
mQyP8WjsCKiBppwiYshBIJYLT4onoOf8AIzZJqV1MeV4Vb+rSZDCKsmLviYeHQ33SItZY4TqOdEbR9e8

I00ulxtKBIonI/xtAYB/w1fbAFhJJOhJMAoYWPvOYWdEMGmGIRnDTLiTLZwQHNmZGHeXUWwBSCqLMOdZ

CAgICAgICAgICAgICAgICAgICAgICAgICAgICAgICA
gICAgICAgICAgICAgICAgI/0/oD2naN6iFoDaDd8X9gGF7watWBttzVSe4/JYYlTO6jVdyE0sBgVBjNc

3xYkNO6bsUzXJGms4cohoPd+S9NdBeh6e0dIeyLC+jLGn7ESLC2E0HHz+Zn2n9br7p7CgSkS0ulzL0Ii

vQs825XpLyGZrnfsVj+1kSA4dnnqofdB2SV5CE3lL9
Cq279Y76G+xZe00LFXiiADrurL/8t9SsqGIGEh5JmEXSSn04fl24ZflSVRzQX8vTC4sm9ZZpCZXdCgpV

a9hgazyIgG1sUl3XNQbh3LIUehpm/TAO1+J7FQEtWvGMy90uvjiO+fgGCU72z/nICvZ4CavsqXNq9sRv

90Cs70Niq1RFinZJgd05ec4jlgKF8PUmvp6d2KU8ll
IecBIgIZyh7opDUuItv5xyDOO8Ywt/JOICf0kQ1mxHvRclvne6j6FczRGNVlEqLQMFVR+aWs8lE972mc

jscwTQDVB4m7XLLdNgSkI46XucuarPm26LvO+EEetjau9GTVcVDSADdFO4NnLi0kx3alZOB6FkNWWrmQ

gMSVIGjxRdGwvB82c2Szn5jIwNd30UbnCSGQWT2YWN
6QGTEtslan9PRYf5WBmJJYDqQut9Ev9LEPJ7A7otHU6W58ac4Ve2KZNxQ0qXWZ+dJuT7BgdJoUaFOZo7

sjxx4AdO1S/B2a+3Y6r4Dmxfkpi9KFLOQ9UfdPKcM+Lh1Ul3mnIOmKcY0yLmLzBD4r6D9RkTCnBVARVj

c0GSrA0UUJmfWs2HJYUBMhOHoqmhKY0QfuhDUkecin
rlUL/35OSjOzibgGjwf3j4FjocPr5FyOcXwNdlIQCFfmzliz4vNCL6mEGHdt2x4LMc6zNiBDCwKOGea1

LgNS/1L6c8x+VkMp3v1pEWsoShc/mA1s//qSAr3O1yM4Ch3oX4B0peoLAMEufPF1lIP75WUn8AGY+Eue

fYHEH5AheA4EJo8ChEnpKPyeRvjV9u4LV3u70cF8pf
z5oSqpQW3ilcTbb3jM3Caz3vtU4x2xBAUg2T3rSQvPlL8JNKWJcWZwciqeZ991U/wBKDthvQ+knG/3TN

p65NXHHKypxlHfje+6VKd8jlLYw7e32OmukH1JQ2d/q4Z/u2iGalNJAr4X/ItoztCSQO/AqaURhjF97O

brxbY6ehzttp2dCoYsqEwvx2i8nkc8liSDqrfJf0dV
JQ73U2TFbnO/feqW6b5UG0BO7qvW36NKpN7yUXV0suaM26+S/6608JdANRrTmwYdUYYo9G1dCGixCTXu

/GTeYyTluxiqFTLK0zPNcxQl283xWVm207tnwgICtNqREAoqKv8d7C531jDcOcQf4H+RUcg25C3fb

DF9+hXvE/VZsL6exeCq6YceOmynMD7IsCWHe0GropN
c7p5o5E6eUq3EbqZODxwb9M9+ujUf91443tE79BxaOBk58SKmlnBHj1HkVlVdDoSXTXItnVv5OpXEH7j

lkrN5xuk2c0W2tzEGPtul3KdMuZIJus87fVl5lJdha1mBInvO9MTErW7l8q4qRlEbfZG5DxpfngU6w4e

U4Y48pZxLYKv+ROZ3lzFV5kff8OQhxf5usjCQTc8Sm
PzRpsqrmwTqXG2JRL3tgT037pUJwb3b54WD4u7/ICwAcmTdyafecV0UwfTrpHP1U8aWo3Ok1MtxtURLQ

HuB35VTUjLoDfI6NHybqR5CdFCS9G5697eVg/wZM+BEmpfsdMy121UeMzBMLx3b0zTNPJVohQ2qmGBfV

CojhKuIB0jioBCY0f/BWLmlCOpynvJqI6f7PQC03Ms
AeNf9LDdwKajE3ZYGjSsyW5X9CnZoJQIvFpsMwPexyE4zb4luWjscU+oMNu2Nkua5Q0HJ2LNLDh7tI/N

PRs7CzbRpGDJ4AyaCtl7K/qYnU6FlZ3/4gjMIjHUBjuUmLgiJzJWEKI4f/H5W2jF8o8Kc8CaV3f47Lzs

pM0pkmkvGteJFSIEN8MHKxsOgl3jbnpWKYYCo+nRMh
oP5kCoyV9437FW3/loO8eB5Rc0zEEv+H12BF4E0qckx95bvb8VTf5BQbGte75vPImjvsshYlWjA3Lb56

kz8+8Sk4VKgfxxPPe1CsJEsdToMd8BgixEQotrJi/LMUS1UtT+0KxIDmOrmeT068z28tvpcUBm7twkNl

g3dcnPMTehgV+bi8KTLAVjMiSAb0NGj5IEc4mEbsS8
pgXulkQsX6eRzkpkvcW6DA66nfKLf9u57cSQOQ7b6jY0TCk0CC/8Glw+tYgKFbVWo3SrHkco8JH5RD3l

1NjNqBqKAb8Dz4OmJx1COgF81jgGsGd56HnxrVDnth2hJHHqNqKiVC7U4QKghqJCUUtiijJ5/zVoZEhk

FefyVyDvkkLlbh84Mti5Qn3PbetDxbSPFiEgxgnsXW
qzeMvDfNZCL+GKGzY7NHjOnNNKY7WLtFzefj1eXapgPye7vdllERksAh3uRgAQW92rK6QDQS01JjmwUu

aqX6w6iXzhI8pHSNej5dFdCF5C0n7dUcPilbJqRxMPkp0qC+Cvu0MxIv45q4vT73tBl7+IwHU8aCeAek

d8VXk2982XZECBnDn5kEdis/zer9BctHAsxBkNlNsN
Fk0ThTcqFxShgZxWA15PJIt6LN0qjfFHJ7PljgZrS98kYXm/VCL7e1ilUFXCW47+KoGdc8tRBlJ0XbE9

EdO2zVwLYwjwNUKVKBc5Ou4PkM46YbEHhmmi6TDXelWpv8TqTjXmjIBYIYRrXriB+Q1eC/8jm/7DYTE8

D0Ul2hXaJ0wbw3OMCoWZ/CZ2D8v+QaMdCO0NLiKGKV
MoBv8CHA/62FDtiSoSwwwxHHdbUs523lp9zmgoao46aUx/vyjrz64PXNF250jp+vMpN7iMc+ugReSmTw

xsb5tXayjPvHGwjIfLpP8ZX0s8PaWksf+CSsF/vevYEAvE032ScgwKsvSm9Dwyk36xJ/mOOxC/cOKBc4

7PU41cZgSGjjQWc/XifaKS/k42sKAhHkMYH/AnaiTE
ZrUQd1m4gtHwucmdQs9ynbkBmm49uFTh6hJQqnNgSs/UHgTOTJhG2t/DbAqcHIWVNKGOFrD5A1h57Tvs

DhQCgZJtS/E135ASJwZNCmKFQN+gvc1gbh2WzNUL+rzKW2pt9aEDUrnSTwDZskOWAJZ9U/SzNtrouamw

rJo/mxAmD+ECkxAX0Ahxw1xcg73ffqBya+nBPoCqHu
OScLLXzkFAFydOLCRZi8Uxnx2QALctOxo85ejlW24/Qvarhq6sc7Y6sY7SWy8Csn57J0uBnOLJVRUrMZ

kKar7UY5LceRAKqhfp8/zOmVj41/T3CP9XFWjsuaHnMp6coEyW5mK5ftM2wpnqDff1znihLXLJY0ho6P

dAoakT2RsEv9e0XiZu5pbjpcgiEIZhVwSnQMJ19iag
N7RuiYfJKN/FzxEGK3atFlfOsG8rPHvNgS8sP1Igm+uWhU82Ne+cqlmIR6nQe5zQ9TTeT3xKLApjwQ0P

1UvemOgC5dLXujmwpFes6zUhd4F/SfjgfyRhXmckucMFeFjHS0Zv1CJlGLAvHjknNXUEkoA+cvfh/P4j

nUtYw+mImV35V88pkFHbWs3zcxNMTyEv2rAuSDtns2
X3I0bFA6oezaaIySUNmTXDl0fCCdlMf/tmC4paht/k9uORsmUxG3clzjx6Tj7dE6HWH56fBqmuSVCVP+

2KztWCWj0AaQzgnc4kz/R5b9icHts/n1xaXBRBvfBjuXAJ8I8IIs0TT9QfkZh6XTmkybyoQZvIMm6b+r

7sLINDW6X5LYrJ9RHlvy7iPC9FVIqHQOU3QRs/x78J
QtjtCwAsUQ5RfeL303YAefEe/b2Fp15G2CPY4tmcks+bfZLvOJ6UiuM59dXJTe5F0WHId344TdfemMDw

pWfRf9WsprdheZpWJBtl+zkzvNNcMoTQu4hquy3BQeOXSENn3riIlDxIUmGVhh7PjuYrnRan+M2jFj+g

OZevEbWgPq9gc/Ee5f512afQ8s9e8aUstZn7Mo7Nu8
92PCeQELWubehFiTBeYlW6IokZj+NNcceLa/u7wQkFsZVmP3HKE47SMlp4qYxEoyLBk3tOv4u+5Ta83f

fsT5QY8WI7yc7ul89QPssDQoquy1A+F031PpI0FyTOJaXvtA4VxNtgb12UfbNfd91ebCWi5xyeER5+CR

HTTtSEFLIo22iVZeptdD989RLEOnmWRDSiLyzdiNo/
O+AGSrqfpIJk51ezAYh09igZGrWcaK2E1AX/JLlJV0yr8gfTRffI8906bw0UMicEoLL06VJTCo2gg0L3

ocIqjuwhIsf0yKhx0LZDK7djpd326loIdrSr3tD3L6i/VuJvsiiMd4YlBrWRNZfg+hqh7AhmKWcKpha+

KLBlTPEjyBLwR9uIOw4A7n6qZOus2MaAhTIO6LWVYF
uKiAdod2aGpsktuF9qkfX8zc/xoOeVukcA5M0wbuNcOs3g+5aokQEJiNTvicaXC25RrJg/cKodGRjGUd

iNGIOpj+IlAtgU2LAk3lsLC7AQVs88H3/3lzZ0sm7n/v9113ff6h6Vdwz/pjyoUfURJGF4Bp5x3Uuo9Z

PLFZhW7jRP01npcqzvIcnc+R1UXXhEQdTMf6s2K2JF
fetSskAtMDB7bcrxOrXncuz6rEkANSCvYONdImlJe5kHcpE2LQtZJZAxWorURoy3qzDgpW3C061rYvtW

XddOfoLKHx2wlXmxBf/yFOKr7f6ngpj5Vibgetzv4ZqFD8yFQ+1hVS73iz0u/hw84ur0PVTMJmQ3Ef2c

DTr/aQSiFokvY05yso9cdnP3y4g3VMif9oCvV/9ITY
QuD2+25uMKcKb76Rxw5mm+GKivNR6vFgpH8O20WLNga8m25j65cSuiEo+sG8hWJiaOydlLMVgV4m9Zcr

XKra5nwEXyfkHzWtjpn4Xwu1HAMsBJviRmzj1/gVD8oAwUVcbE68uv2VJvjjwajnyZcKGKcPfru37Jvi

8n+VX9prpvFu117sFKCQxrkQl8DTW6YkNvHJNeqDOE
U52T4LSgz6f/wpQxEMb2y4lmF+E2sRmvQ86SB/NO7nwPms3WL+jNXdM2MnLHoYs4Cez8leGj/AdbIrOH

Lp823UfaZW5yWuL865M5YT+eqLeetOwGnr0TI4y+/03jQfoYkloR6vgtgpauDGb6C1QnrRhbrxXm6Twl

vUc9VQoe0+gVyDu9qVU8U4C6rpN0T8OxVI/KM5t5d0
Z23dL0/sE9cpCofqabFJlNy8rxf7ooFIw/gb/GM1ONcDMSIBOocqlr2r0ut+/16Xv4KCotsfXUMW2+xJ

VxCuDymXyTkYg5DzPdFhSyugVXJ00A00Zh9fAlc9z4xJ5zhC13pwpbpkNBcQ3mfGVhU+wvRu8GSc/V9o

IYghB0yplX7Ed0NF5U1QizLhq0LNwj4PL2v9IElASg
3xSck/Kt2kDHxHMBnw2KksnizLfkriYsZAz2BRBz1Owr8kzjqs7Z0dvrlANYZtMkfb9va3TEjnbCUcUh

J9KKMHlH2Zw61yW0RFfxFeHdiJq0fWOluVcxRD0YQxUsefIjN0NVAyHZlHApyflDRnzyt6YxYYkVist3

9HG3y3GjWsA/D6w9TULKuRC/5To28Mmxeb1GEFEMGd
FD5p55xb18MKoq9L9LuHLM5AbKmmo0/lG+VE/V5gjEBCsPwaqijautjbhRmjr2a0IwWcQhzs36x1au6E

BG3ME5jDG3yg3zBCjRozqGEDQQuxadcmG6vEHsQ5bBsxAeKjeuj2z/2r/St0joOojHBe3sDiAIG20hp8

wXQjOwCMKp42duLtXdoegAqsfQvLoqEMH1lMuDlWco
Fo/ZxSGrYtQVzQOEye4HLAhJWpE9JZ7qbai0bvqfQ63qFTFYPkNzDZT7JPoq2bMM43yj2SG2EbGV22II

cADaplRD63357qo7TOVi2cbutYD0Ul6EnvpQrnCRY/1CYADnQgmtZ1Y2tgirN1rJs2NqdYJn6FQNEoDn

LSuZkNzmylliqfChUFhZs7uerMGSeeP1dk/N0rOSL6
UyVGADAddEpwzhq5u/PujaEOHHJKK/y5QHtJkp1VLH4CggAk4css6a4a/Mkv1LKIeDHxjWj1DfoRCALS

njvtcEPbMSIy557puUPYInS27ffFxge5QCa2X5NNPeqIb4ZZDtcSBjhpXG/1H2JwlPKMSDQdhjYFPota

j4V4MxbVId03ADtuTqRKMYdDeHQh4ZErFU/OJuGsFS
4nuyBAVXOhfKWX/s0dtVZCBs/x4XKi/maxexrI272NI17j0lYO1geGde9v4RH4DaZxNQLShO6n6ysaYr

1wDW1w4T7sBa/eNCdHQWqy72JQ+C4Ou5LzksjhnSBA/pafTVHwfj9AUATeIuR7fi/hpnKznR9mE8FthI

6Q42m1AfcY/jfxjTBg92SIlLEWcFnFVgLTvUJ+P+Ni
b9bLPz+mEEpIQUyeiKdWtBkP8sO5H784gym+S2Xq8JgPZh1SKJNkmtYpkgjf9zoT1uBgAKoYubTygCEH

9AiUuQZDH1JFgZJzoFstur/DIjwfOmpL834UyPnu+Xv/IYpjcEWCVhOlpR6egf9R2j3+RFGEHQrUhN+/

XHYPcGGHdAciplIy/cqgQ+/Oj2z5EhYxWrWDg3pbT/
5Ln4KTuMoZNFo16VRIafWHcEta1mj3tOYZ5kxS27qxtsXokQWxFYAi2N8Q3iu2xafm5Nm8aO1QwU2GQ1

ZBnfP/aaTyzuIrhKlZA1GrfLUshavYP8uBfqou7U5P8t0zUPZvTIFXpgbq3qD6V3cqSuNkr7jxbVWJ8K

Ik+IETV0AsB2NVl9QpmpvFzwWYh7X2Kpri4fis4FO0
y2THULZvc+4GlISYhS1R+qSoeOp4C31WxX+09Pysji4tap/ctmN6v9+oNWMXZuVEjnZrmb+y1hLWuDVC

icEssoP8yRawa1K8if5VrCvnjTVmiibe7XuhhK+j+d0ySIbDMKbeFi17ragDU91iI5rBrzf/3F2QsSFO

Zq5FOlvDarwiB0ZybTs45ABeGWnJcvH7R+uX8q6nsG
mezn5RNfPJxeI3CY7nvzdk5AVqL22G139YTGzPxmbqkpKWv/XfvlzzeovkbjZxGhvaKvJoXVbDAFywvT

rLk35VTBL8swW40/pqUevsI1kHTNOXvg/Q7kTLyBCkCn3B8VZ3otj7staV3RlX/EkXTeOmSHce5SVB8y

hJMXJXzEL57okWe/au/e/5nQ/irLLd93hKXnnIVC57
RSalL7VVJYTddhSgpNnkwU2vPY5Rxt9dGgBR52I1rha3iIn8sxlQyne3UjzwAFgvpEyfc+P3z/h+/jcX

54/QWfx+v5fj8+m9fKS008b6/WFDmbKoYHTZ47pQN3X9523Rp2bvrYejBeeL0ZMRdS4YeElHNnejSi9x

el92B+Qu3VHAxBSttD3qPfxiAvZatJlY1QbAwqkqbG
DQFfNvbpJlm+u60lZgPd3vr2Y0O1BMsyHTRwXjqboMhwaytm//ittRhe1Uaj/P1EhOnJJXvWHPk1fLlY

NE44Bzv7VORrAttXwoIhzJxljdJ1dC1GY9HVtydmlyHqPXfZp/5kMQij+PENKS3WG8g8kUeOf8PC09Cp

RQO4FHvooGEdPc+pyal5mLpcPCW8D0FK6d3Nxvk8TF
mZ0dk9GGs1PmaafCqhCIvSrNLa3FC/Gx2cA+yYBlSJMCLsx7XJ+ReLPC+7qhgzYqwlzJbHfRU+HvbvBq

iq6EPfD4i7/Z8m4vMtrzQOU2lUZ8x1BhbV+nUJiaT739iNx6h/s5YhMVSVO0emDG7hWEfQ0xPyDuOnhF

m+Ie8NQqCeJm3yEy2wCR4mj2vzITxzeY3VJZLO0wdN
sJG5nj14/G55kbLg1KAHSQtRe4iBDp13n8PRN97WDNK9RVKJngsNB4WHM041+OXbLEUAPu+Enwivy8t2

tR1S5TCrdCKEM73Oq72KxpkUre/gGs2WsPlXSjd9Puz9nsuiImujQRrUgvOq6YTUSkBCZQrhmzQHeLTx

XlGIvXaVBVYX2AI9Y5v9qhqdY+UiG6ElMsd22YWr2G
QZieGi8R6g7p7sLbNqedHppxppP2WoqgUWRCsVITGnW8/kqaWMVgy9piXCvBMsjt9d4kcZ2b1spcnFnw

9USrmVaUewTGCVQVrqvMNjglnPz0Izw0zxjfv3HmAYRArwXzfqBU6EFD2VC7NBtIT16MUSl8cAjbfbFz

9D0bfieVbFe9WgTVTYByeFF6E5YLapeEP6TvxqNHiJ
vgi2rnoop4Fk6f8kfM4prYQPgOQerd2fTsrTvRzxBXCbL2NtHmqL6shMIRq95g9T30heCtGWx4v848V5

ei3o386MnqseAMMZ06FeR12zAKP+qD7sCo0bzbuabfdMMDlXhxIAaALcCqXLfz3S0ZZeHSfv78Sle+2g

kuR0pkjBkNY0lxWUp72uRgCtHIpYSK2fqBLdUFs1Ld
OJ/cclVfFT716SlCzdBhLHEVmaTuN2ethveAKNFZFZ/+rpvZcjo2tS/yIwUcG7AeyQwqx3EmR/Oj5Vvu

SnqsQq6YIoD6nDZcwQ/XdJzCRAMZwqlkremJR2fLMXAkPj3zANiq7C/wxRl/MKXLFvEMxg5O8kGEUn7u

Pzlr8eCxpPvtX3YMiNwuweHfp77Vn+KrUsSzbB2e5H
aX5LlXODZEn1enIMm2S3duvvfnM91PUKndWdZ8WGK+I3uKPirdvB7De1TXGzgsoYfz+v/OB5cGJ44VbJ

ZTcpI5Eyv7X3sRBwiZLe5kMEp3aJI9id2zh5w+hyEEDCTgOUrroCHa2b+E8174YD+SUmRrCCDhvA96Vm

LfJICtdO3fgZ99DqpdiVxR5+UhCr8pJfaqgb5DFQsX
eq9/fmakSF/niR8GfRS/bvvXTqvTYkxtDNO0+Vk8tcbePuDp+wODx/yZENf1rxue4NO9/yPOhOOHc0fe

YkM4our4YjBJWEcy6J/StyZTbQ1rgPJrajTTX5LZ89w1MrhbkTK8d7TysnOy+5drhNah9sSvZ3Vng/X+

XhZiAk/o/vc8l/nRqcXnxWfZImZ9brHb6mYoAAbcJ4
/hLOOLVvVh/Wa87axHyd8C0Y21PnVEkCsm4VPdZLSF5KPzYEcrrrW02RzRCwkVsNgdN9ToqnRifst7aE

CpaB6ktIm1UN+VtFBsmfaWzi3/o81FcD5uuALeDQa6LQ5xR8y44DM73sD9gqUreqAjn4aLDqzgQLm6lJ

ZaasfAqcmZOlU12U1bWe9ej+/bdx2ziWsZOzoV4Jgp
lV3DSNWUQK/mKA0oz2htrqWUFH5ajz4GP0nzGdcMCqp+yTDAVQ2jrmrf+5tFCMNPlnp4c9Tnvozd8DBL

cxHqjTz44YB5ohZBdEN/CKmxpYODmfy3RYWl8gtPDc2PqX6edfyFJUuRV8hu+mHKmvW+aNVtzV9mpUF0

/k7ZG6+L7T29y1kMW9r3f1txQvLF+gqcPg7qLsgeJr
DjxSdMUUAoG3odsbbttb9QRr4+lxUmmMh9kIN3m/CfU5SLpSMxcHPdZ4Uqp6AtVoMDcV8EYORd8jCUwh

m2SLrgYhARWr2NkME9zXcEMTXBKIALUrtPhhTKaNQrnPUKosKnBK2BI3A/SvPvhDsbPde8csR7oY/3CU

OuGEjPcT8mkR2NLumOtDKmN5r9np03+Tj5g45VfCNX
C41BhMws0iSIkst4j4wE+rzCnqcvd06MPcV3m8rT0WnckykbgRQ7H4tyQiz9IQcjAWhM5KJnQklVcZR2

SQRVgfM23ln6n6LnTDhEsmIpUcZVQhsdDCw4E/7WBrQo5KSI9nq2ZZO/5Hyb863U83VV6RFHYR7SgFXu

5GdVn2ZenLkbhbfbeja433pc3Tq/wVaghC3b4fcXMD
6DVP8la/YqmeMmvbEuvJMbw/ysWhIZF68G7HA+MJlsn3+oRwOC9VuS3HPO9o2hszXAhGKnQhCShMiGXq

xxCW+v+oKHho1YEE/hNwXhhE2aPqGsUR9ZaLFxsGEpnNGD2aOcw6tsbK6UfUWt4ph/uM1mrc0vMyIOYW

Tu0pBj94Ryf0khMfU2PpVMacCWH85QDP7x1IC9tQiz
P8Zmo2TvwvJBrWiyupvslhDfi9w3VP8rDflxZeqJV3ABNsRYQnzunssxh9L9tW0xvhCjnECUepYni0mW

ikWK2uS+favboKZ8MbHwHeucdGAHrbiIacn6o4mudHYvYDw/A9B10vTvjUbFWXaZITcnavwhQy2egnYM

KJ7VJQ+qwudB0vdCFYfQ9wjoc2CFYyS5jC97ofdMBn
1wDyMzkN1t6ti8ZwD14rh6cWCV1PWzmcKWUtV/kn/+T/Jptj/kQQqn9qXKvhizHonMNcNB3MZZ5we2Be

JJH7DSQbz7DgLKe0Z9mwbzt5uHPaCL0+z8VmJPLlIOctYqFbQeUkBELgCgtdVZEyKMFvlIitTJ7oBHKX

frpBIKdhzeDzyUKoZV1CVS6qt8VoOWB9PUZma6AbUU
l9I0OtcZBufiAeEmvstWTIXHJsASQkKGYlB7SlZUrgQ7iHKEr4TAW7KJV9TEN0QfT0ROZ7HxBwFQlzNK

YwMTVDMTgzRkU+THuPPJQcVCC4YjEWHpC6DcoMAIF6DwX9JsDuAVMzKDEVDS3tR3Xia9InNTFbFDSrSK

6zhlQvVMNpJk7IuOsaXNB3J3W8sTSaC7gBDYAbBrEu
MKW7VQPzCu5oaLVwBB7HRbysD5YqFWDzQRUNSGHHSet+WBKKfZPyH7UzXnlUdddIJMHSPST3kgXG/xnB

IQHC4FlKAPY6nfTkOZz55EvExY8KXMPhZB9wFTpioyRlsBTzDZ6PDcMiKX4kae8YHjS5JXN0dUPaPi7R

CCy6WrNyOMclZBREQj==









                    ID                  Date                Data Source

 

                    [id number]          10/20/2020 08:27:00 AM EDT [facility] ([facility])









          Name      Value     Range     Interpretation Code Description Data Felicita

rce(s) Supporting 

Document(s)

 

           Triglyceride [Mass/volume] in Serum or Plasma 146 mg/dL        

                     [facility] 

([facility])                   

 

           Cholesterol in HDL [Mass/volume] in Serum or Plasma 47 mg/dL   35-60 

                           [facility] 

([facility])                   

 

           Cholesterol [Mass/volume] in Serum or Plasma 199 mg/dL  131-200      

                    [facility] 

([facility])                   

 

                    Cholesterol in LDL [Mass/volume] in Serum or Plasma by calcu

lation 123 CALC            

                                          [facility] ([facility])  









                    ID                  Date                Data Source

 

                    [id number]          10/20/2020 08:27:00 [facility]









          Name      Value     Range     Interpretation Code Description Data Felicita

rce(s) Supporting 

Document(s)

 

           Glucose [Mass/volume] in Serum or Plasma 100 mg/dL  74-99            

                [facility] ([facility])                              

 

                                        100-125 mg/dL     PRE-DIABETES/FASTING

>126 mg/dL          DIABETES/FASTING

 

 

           Urea nitrogen [Mass/volume] in Serum or Plasma 14 mg/dL   7-18       

                      [facility] 

([facility])                   

 

           Potassium [Moles/volume] in Serum or Plasma 4.2 meq/L  3.5-5.1       

                   [facility] 

([facility])                   

 

           Chloride [Moles/volume] in Serum or Plasma 103 meq/L           

                  [facility] 

([facility])                   

 

          Creatinine 0.9 mg/dL 0.6-1.3                       [facility]  

 

           Sodium [Moles/volume] in Serum or Plasma 141 meq/L  136-145          

                [facility])                             

 

           Calcium [Mass/volume] in Serum or Plasma 8.8 mg/dL  8.5-10.1         

                [facility] 

([facility])                   

 

                    Alkaline phosphatase isoenzyme [Units/volume] in Serum or Pl

asma 66 mg/dL            

                                                [facility] ([facility])  

 

           Carbon dioxide, total [Moles/volume] in Serum or Plasma 31 meq/L   21

-32                            

[facility] ([facility])            

 

          Total Bilirubin 0.3 mg/dL 0.2-1.0                       [facility] ([facility])  

 

                    Alanine aminotransferase [Enzymatic activity/volume] in Seru

m or Plasma 27 U/L              

12-78                                           [facility] ([facility])  

 

           Albumin [Mass/volume] in Serum or Plasma 4.0 g/dL   3.4-5.0          

                [facility]                              

 

                          Aspartate aminotransferase [Enzymatic activity/volume]

 in Serum or Plasma 17 U/L

             15-37                                  [facility] ([facility]

)  

 

                                        Glomerular filtration rate/1.73 sq M pre

dicted among non-blacks [Volume 

Rate/Area] in Serum or Plasma by Creatinine-based formula (MDRD) Laboratory test

result                                              [facility]

)  

 

           Proteinase 3 Ab [Units/volume] in Serum 7.5 g/dL   6.4-8.2           

               [facility] ([facility])                              

 

          A/G Ratio 1.14 CALC 1.00-1.90                     [facility]  

 

                                        Glomerular filtration rate/1.73 sq M pre

dicted among blacks [Volume Rate/Area] 

in Serum or Plasma by Creatinine-based formula (MDRD) Laboratory test result    

                  

                                        [facility]  

 

                                        <content>CHRONIC KIDNEY DISEASE STAGING 

PER 

NKF</content><br/><content></content><br/><content>STAGE I & II      GFR >= 60  
     NORMAL TO MILDLY DECREASED</content><br/><content>STAGE III          GFR 
30-59          MODERATELY DECREASED</content><br/><content>STAGE IV           
GFR 15-29         SEVERELY DECREASED</content><br/><content>STAGE V            
GFR <15            VERY LITTLE GFR LEFT</content><br/><content>ESRD             
   GFR <15            ON RRT</content><br/><content></content> 









                    ID                  Date                Data Source

 

                    [id number]          10/20/2020 08:27:00 AM EDT [facility] ([facility])









          Name      Value     Range     Interpretation Code Description Data Felicita

rce(s) Supporting 

Document(s)

 

           Leukocytes [#/volume] in Blood by Automated count 6.1 x10*3/UL 4.1-10

.9                         

[facility] ([facility])            

 

           Hemoglobin [Mass/volume] in Blood 14.8 g/dL  12.0-18.0               

         [facility] ([facility])                              

 

           Hematocrit [Volume Fraction] of Blood by Automated count 42.3 %     3

7.0-51.0                        

[facility] ([facility])            

 

           Erythrocytes [#/volume] in Blood by Automated count 5.12 x10*6/UL 4.2

0-6.30                        

[facility] ([facility])            

 

          MCV       82.5 fL   80.0-97.0                     [facility]  

 

          MCH       28.9 pg   26.0-32.0                     [facility]  

 

          MCHC      35.0 g/dL 31.0-38.0                     [facility]  

 

          Lymph %   36.1 %    10.0-58.5                     [facility]  

 

           Platelets [#/volume] in Blood by Automated count 284 x10*3/-440

                          [facility]

 ([facility])                  

 

           Erythrocyte distribution width [Ratio] by Automated count 12.6 %     

11.6-13.7                        

[facility] ([facility])            

 

          MPV       8.6 FL    7.8-11.0                      [facility]  

 

          Mid %     8.0 %     1.7-9.3                       [facility]  

 

          Neut %    55.9 %    37.0-92.0                     [facility]  

 

          Lymph #   2.2 x10*3/UL 0.6-4.1                       [facility] ([facility])  

 

          Mid #     0.5 x10*3/UL 0.1-0.6                       [facility] ([facility])  

 

          Neut #    3.4 x10*3/UL 2.0-7.8                       [facility] ([facility])  









                    ID                  Date                Data Source

 

                    [id number] 2020 11:00:00 AM 

EDT [facility]









          Name      Value     Range     Interpretation Code Description Data Felicita

rce(s) Supporting 

Document(s)

 

          Follow Up                                         [facility]

 [facility] 

ZSDWFu3nPxANRvMpBVWxYxgSKXchZFjaVJEpS9M3UZzeAl6YOHemwtUjQLDhKr1+VDNeKR0frf8bRLOf

gMy
2gIGArYbptO0KbSNZip28EJJQrXHtQIgIvYqIiICV1RPRuDmI6CTG2OsIoSnlxDM6eLES0WGDrHNhkEY

DdIQzcWASnJVSdFf5qSXfvRZrxXc0JMU3io9NoKTLqHNHzTzlZBNiiUPdfJNWhQMYkYLFbA072vpQzUv

8PwRIzPSi1KANdQmV3GYKvGeL8NKTsLx4fMxFvs8Yq
X4ExBSx7U9nPTsobG1BdHPohDL0hLDR7ZMLkZt6VePixERwtBQBUC7zeDoLlVVPnNMKNUc8+Pj4+DWVu

KY9vvo93SSSzx9QfWVp7N9E0gCYbZ1TmC9XeKZAzsIMSa5kdMlYqJTG4TJLtFluxGC7BYGJhhGHcWLFh

NFkdVO7yrwAkkLX8KL6JqQztEPFhURPIXd1+Pi9QYX
RndtVfVlCuXVKsT82qtBEnsECpTcbgKZVLFU1+JUZbTY9nsg58BBMyo8VuYYs5O1bwjtw6bJErJAFbIV

VgExXnESLwFJ7fPF2RoIC4fHMhOH9DbUPgLS8CdMOkKH7WB8ZwKSE7T4IikXVtqnDdD0NiNARtWHNxw1

GoWS3MD9GOXQZiIPZgB8TzbG6fE9LfE7PnZ4Ktmxko
UNKXOj5WlRR3mHJyZTEgD1nbxAkblRUhPHqzI5RliRRREEBTv47zn26bqyZcDM9+u0WpOCAxJYi66fk8

MKYrd7vh35gawazB06yi1+IR5G1Yc3zBuF5EqSSBbTP8r0fRyw1CJ1CHfo/VxYfGtja665797wyipP96

AZTwrx9lmY/b6lu60HKpCXGVOKPJYHHPDKNWVNDOMv
cEh5VIeZMkwrSGB0+OrJoHigLpaBpNcXJ0MYvIRZhsLiNFadystz3kTnEJlw2aEv4xwp7bUgxBdjjFlO

mMhUXM+Y78Hef/j2ysDcZjrmHjVARGfNvB65Ko4IF2VtCgHGY9UXl/M8D/OHo9PJAbNERUHHFKbatkoT

LjqEfM9GlCXIaQsE3cC/2ZFyKwuA69+yCB/FbNDOW9
Jl2k56H4ILsH9i4W1vzsYx1m4eC2oDYfPjGLEqoTVjWkxu1dTw2NOamV3hJrcrWHXnwj9oh3yxgDoi3/

LNoj5Ls9t8q0amg2bh0ET1+MnKaETy4Dtmb2Sp6vd4GarWbiwGr9tt3tobm8mxYss46ybTf8IDOugo2r

dCv7RZZrC3r9a5X0aThjZvg1phu/eIT+fvqXLjgAAt
+qC++vLnhERARElH/pgoP3+lcCPESkdx+B37xxfFq6rQ0F+GWZDpGym7CLiLCT/LrO1ROSaNPJo2

cO8i9p/6bs/4qsqX0Ljp5liJ/VgGnCFBN2T2wRgQDuaTIES72m+j/8wz/8wz/8wz/81wL4/mxQkCC+3H

16Ywdq4N6sxYnDCL2SoXHAE3RBGHN5EoNSigjjYjgR
drtI2q8Jsjm/f5f6hsXZj8U0drJDDdm8f/Bss1idAfOU5pa+DxaIFcCWSYcMdBOPv39vVftgWwR9kNaJ

mjT+P/z/DxxYjIo7+LUxsxAn6L11ErUgnXsKuK7vny0Jwp7GVVtgx9hUObtDRFZFG6LP6JBF4cPRKnBc

J4zdSy0nI49fB5GTOJx7gyG4zFUo1fEaSFKYzYQAxR
UZm06kDEumjwDg76WYAxZNFzZRagh8OvGkDYU8r4gl24o2yZQGnsDVx4Oa2CIPr6mBuisrTOw/nw8jYj

/FihzXYKbXWjO/E4ObUYI1zc0/wIVsm2J4OksKJtlVSRHqi9AFHwoVvSIpRUSkQE181J0CV9/p1C28AS

b6XK4b9OU/2LKl2BkCvXc4F03ojg9PMiCdW9lHkLRE
auNt61MGZhLM22bdIAogtgc/vbQUXG90XLBtOqT/MwserUigdfJZZH8Ps9Lmx9+s6R+qyVs2fP2shWKm

Ckfr7xm9Uv8O2U0s9glnJhuGHizlesoRJSNJVbBQH2fzjgOVd4O2q1XepNMlJLY5z7novWs9oaY/fCDb

/ZDP+pMkjmM5t4OVK18bzXTAOWsnyEATWlo3DLog75
WxGtQDrBvBPczyUpJX5tNDP3KFDmEnnOH9pReomWOduvuJljuBCJQc4LAus/lZHlGO1GoFDW/NV5owaS

QE9f1Bba7vFWUlBsDwBigR93C4GEq6FCL+7kotpyaVT5j9t7nMbCVodArb8oJQfBFQVZaP7DdasC7lhf

3wMYzo89r5BB9IGbz6QEi+0EkhbBJEcY/NsYjkv0KD
G8Eq0VEkuF5mWexu71YvzG9grm1qS4zgp7UuEJj7WnX3lLiJ447SCY1mtRyHZDuMz1UyYjxtPWuKoHp0

AFW2JXCnHLYYFefdgmMosy1VJ2uJQVsSgkJRM4R96JuQMCE2hxa3QMbACu1LXevQv7VSYG+/96D3Htnt

326FMJ+8T7mpHGJv1fYkwiXQx9j4dWqQAF/Q1LAy3n
JaiKOzrAji5MAaqp58cLnJmOZ7UbQS2fsZiThYRBwHfKgph1SBqCrmVlBGudGg/W0KvTU+gp7whLI4aD

kL2sr5VadcTigy4AsUO8onDZbIt+vCelj7JZvlVQyBNjS9M+TH2WorUhdux4TkP23E065hg9+6AMvj4c

rVHaxVSpTZvoZUHR92WZZUt+6o4c+qEHt/lse9+2wq
v9f1z9CY7x8cqQw/hDveVRok3FqXL3y2MrKBW/zJnGoOIE/y0/B06DKwRaDGLS6C3aRVMH/gZITXZyZo

b/GWdUYMlH6nzSJmS40yFH3edb10xiBOElg6hmxrQF7pDuoHckvF6w7WaJdUP5q+1YQ0GfJkzqjkEdYV

rnarAVuclUXzixP3BdcDDie56mfBf5vEU7oXam+XsG
M7+R9OUFImLP5GuaJtZ18LJ1w0T6//lfdlBLuj4qLqRCCVxYqiNtNUirWp3eppvZ1uJmjvM+QTdIOf7Z

SJmRV1/JDMJhRSiY7OSYyfxhw8F+z2mjdFTH6xs9d7kINn0qrDOhUvFk5QU/fCgiU77SMM6iUO9Qib0y

m1ZGKJW5ReCMm0le6v6b5fKgCzsNEzISUBQKwWT6Bi
nz0z/H5S8E/q3GtdcU03FkSjXQdY4wK9dOJNmy5W+xoD1a2eyQfIuK2Sk+u6QhJqy5Ejp4wX5gQyDzWw

r9mYJi1cQn8VBHQlRuNb7QCkIxYSjQvi2VaKaeLeuYpP6erwi5i8gKMfZQ11iBoQymhS84Y2YsCxUhd0

7UDV5hwhzdmKKuD4JwEQsstekW1qoL1cPxvRn4yikX
jZPSD5pKBZ1UA14YalJ1QIbpMbEM5Yh13Uzlx/0xDjP/yX6LwrpjudbglKceXrapNHCnaMlQx6YCqWy8

Y5lW995i/9ymiedFZTbrYHQGJ/gHW5gqORiLRavw6TXxQMaz6C4Z3P3gVE+Tyu1Ga48wtGUdL8fwfoPo

nRollycygDDESxkibwy7Q0Ok64c+5lakdsZfvF4H3g
02dMl8hcVB1uaaqlZ+v6Y65EhsNbZLdteC6W0Dy6l+C0o7RnISTm401josa8iPRScI/K/NAmo6eq1AeC

NDIB2DxaBuzw6fltcj/5mzBrbt8zflSQxmdi/InhHQS/zHy02xCym39nJ/6l9NYbdgDKFDsxygCmjmq2

Y0aXhlNhgvbE5DPbqD9hjxMmeqeE2/pr0OxACqXR77
W3VOtzmKJmCGan/M2zgci96CYPGPjNGJhJNyefFZifhvKjQDYgKoV6CM5pKTMJEXovDYxZpAfhNPPhuF

hWWzQ+3TpC0ekkBTzAJ1k8+A/vfQQ7N5MwQgU9r1+YWcyCBa7q9FGxW+QPkJiYRC4x5k3NbwVHgiJ1gu

onITh7/n8bfy0S28FqU6MMO4aC/ou4LZf5DDEKgVx+
jSSpjB5w07jquZW5AFM7axU0WUcyGJmqRcYnjcjQZBES8NdvXaDla1VgCc1UCa2ccEsnYUIUxt+kE+Ov

tgslv5F7u14JVet02qvK4sRWNhOx+qY9W69ILtLYL+XinqDiD3S3KiqcmVm4WEeyhw2ox/Vh2eJmJRJS

di1Xb5iz28RU3+YQFC8k8Nm3IuFKoBZfKDbueLnsYS
RSlh8msZKpSxlG2WaAcu2MlG8D/dPQGBwPV0X5fqwyI7mR2+oGl3MEXDbxTMW1+5ujecmc0hVf+oXZW5

0lL81ZvgR79oZVkM5O8cBkX1gDjZK2N4ew/3UQHO28+hgXySsFE1lKudvNfRmlUMAbraRcekLaGPDQ1t

ox3GjwzfEFzDd7HzduRRxW5L8peIV1eB3nXm+wql9q
YQSdfOgXNgOnHMaVL0zffi62G3t13ftY3xHRhJbZdWkUta4gaK5G/c28zUZo2KswHDZ+YAjFWK1IRvML

laGrP3Av0IPBhtQ6wrDKbFEjhMSjkJwKwRhVj4oGXFVyUpquBZmI33ayqG63jzokOHVF5FgPuylcRtNK

82niHPSw5c7bJnWNxppuFoMxLLl1Y5GzxvLqUZA5HM
RaKIK1eO6oQnLOZ59RENGPVS6lFL5vu089vOevhhyFth/qbQ90lEyMVaGw2j5Nry6c7R7GSqS+52zSNQ

hfdkalG5ad5DcieLNMEeOOdHT2MEAxzo1q7yh6ZPFC6FF3t/uSdJnElbIR+z6gIDVHD0tusGbQ1rq5zJ

vf1dwaEllXIjwew3bds2G819lyyXZjhnhQ4BzIri2k
/fQsPKvrcqOC8VcEfSQZ+8PqsXuYPM1YrouF7PKI1f77c/w8DYYT63sGyopmenrf9o/M2y/FPAH5b9Gv

mxgokVATEH0IL8znrkepDsZZNbTY8JyIlz1AHXirKWVITszU2/z6kl26rtBr7XiwgGvYqQ+VCS+j7cqz

+YVHZtbu0jCMxhfnFVWxMtno9EqsHcZ+UGTDFCU3qm
6Ttu1o2nGPRpFRT/IJ93SbSDecQ0QPaij+FQyyX9hNppZ/EOAfeAAvzOorIV+3hcwjW8m3XzzCtm7/xs

iHBZ0fA5x+DSI/WwGsFs7F71KEAbOUoGjfBf4rrV/Pe7mStydHY9n5b8EkC4XRntkt/DAfG17zLbXKPL

3kpddGPgAFvxmhNV+Szm6nX5c1C92cN1NDEsCDBrtR
u8KJFxNg8P4dMDiUEo2PJzrgCbFfR/ROXTbBpy3elBNHRjVFrEtAoXq5bjypaGbC7DA0c0S8DbmZYaBh

iA1fwP1LL9t7J/me8k6zzk/U7jwWWdcatjgbDVRKoQAdIupw++nIhqzKX9Kgs31CHt5gtyOqR+CMB9qv

p7apJ92vmd8V3Qe7tgrTQOU5i6XwSVZQMhtyc/2bhA
TQdX/eFcde7T1c5Ea7G5rK7+yuB8GgrxDkhYJv7e9n6gIRCn9KZIX2ZoM2vy6zek/PDZHyNu5cuX/6QW

ClUQezQupG05VkEHK7UjhdOHkuUxlye66W8ZpCuXSFErp4KOsTmiIDbjWhQDq7sddJiTcLwgjcxRLRhE

bVcK8OmSnwH2VLmwZAei2/YxCettBQbxM9w7vw/FJ4
j37jjY58wK8fwbe8Wk7CDcD8tLDbxriY1G1drUPc3xKUx9I4Y6P7mfaE2+VJ+s1OgnfTZqn+SOWUOELM

0bG1R75z1idf5M5A8rTmdvuoBCbjZdSWLXHI0L+mdXqfnP3C9XBryztQYvTxHdURN5tXTQAtLViq0s0o

Lk612mDLdzmshimKELpLECzN0JT1IKoN+6RzLBA3wl
+GwU5QYLNuqq3A0iO2L+R9h167JDebIMHiw6XGoLdj3hUHjolwcyxV6I1PyA7UnIfAkMSY0rp+0dGhd0

0lJNf9/P3Tuk22UqVxOwsp1M3RWuiyjMyGYOSPwZ8Yx0BtdjmPWc3b7jLkH6aTx/LVv1YC9aB/qlcIZa

C5ij+8+CYXYTO4MPZp4G/diPryNnLEcsTV1Mi+FhWw
yu9oIZUk7ULXRGl20AkdLX82NqP+zgip/8FG9qEsVl2zXh9ZBkc1H2zYegivH92yv4utyr4EZY2KVZYo

YRavacL1nYXyxw3U3qKxe+addQIsjo9tBKkHSFBxTAqIyiyO7idgcG5YnxDT8AvCC2Xm+35a1pXsxTNK

pgRYjq4o7abVgixz6ymTifUY0KlEenFADAcQXqYVXI
6lzE7O5r9g0JuSEN70wij5cYl/QrYMTX4Kg40LB0Z4cgg4hRh2FSBCMRxZJBBiJ17KUhk00Mi05Cj1dt

IGnSnvFGNCplm+SgGQBeAmJV5rxgcwUKCyvYXy3wOobahGHF4GOt1IVFgrMhulItCD2mlNUL43ERgCi9

LnZxNcbI7NR2t3poupZquWJo/4HmszVZ102oupDNmJ
yGeW23Ci9bWkoN5yFzAom8I9Wz/ruqug2/gW9UEEMYmEJkrlLFRqeASrmKxivRODRP/f7gjFGC6bZ14D

PZ9Pupp1HgCz1qDFsOUmyXBpqx6l4H8byh9jxif2IjL2XvK6o5xH7BZrfqr7/ZwSuYh8iF/AlOEs4UwN

KEYBclATxm6p2cyhleuxGXozzNOCBPl8yqvw6zxXsd
tRYNZ7FXnY4NgFGCj3/JCekQHtr99DHt0JqZHA5+L6Fd3iMRGNwxn3pK32sUaurO5smmhbNzPBwYK1m5

BuYUvx9gYoeai1M2ILKYB8i26mDd6BURhBsizkOCZ5rYDzmI/rhwZy1Ffj5GFvRXc/1C4plQrNmlHOXB

uzVoDf2K/tqL9CXa8CQ+eLFv7+fhax6GF8beHRQ9jM
7o4CIId+jeyjJJDhrEWnQgdlF+26r55lxZDLw4ZkyzHdipvoHWFi3bAHeWHQBDL5pFbzSJq0T8czF/w/

/Bk7gQwtY0EF3WQQTHvtRQPUwumJovNeT0lJoRrrpsAYjdJTlPQ4JiGN5D8ZIlNyteh1kAS/FLjKSRZ9

LatY2faq5D696ZS61E68icl/AFKQSk33pkIK9WdMnC
xNTL2BMiUrrCA/C4wrxADEG6NZq6AKcrFR8lAsuUA34hBddpGE5wM0OX6Kt5lcQVWzhMZLnHxMf+Y8us

6M4Q/wNVHHzpUtobtt6rzM3fW8sXahS3shnjpF3XiOnkVcmUyNAutjkp24KDlvf8h8lwbHSyCKNIpoV6

g2g7Z5Mmo9LfxR+5aoGfRU6+ymDVdpDlsagToHq5Oq
Rh0WBfUCYUZ7YkQU8/tPfJVM8VIkab0PUyGkGfdkixG6b8Gw/d3Tkuue+60/H6ah92rzuZppaE1sMSgJ

5ohKDBFSvIyruGNFlJWE7wPwWwHePqQwRNwFGfMBbbuD9Fkz2uA5lH8AWVInxN32ih4yU9Ro+/di/FtM

4ALJk1EnE0VLjbJUSYKSIhku0bMaFoe0m4JPh2cC8f
uAI2R2JCDTUnJjIWF/5WOKNg0fxOqeCX5TfMEdPiqDOS62LAoDWujfstJCJcFpAJz6PM+ZqpUmOGc0KJ

EIXZk5gncxs1zJyqBa/0bP7r8+oiCtuTbs7iQnu8vOD7Dp4Jnjh9DuRTrSs6eDtlVK1oImVxMFJikIEk

YZEFvwyL71rmr3abea3QHB9OahfQIf2DrbQFOBtd8S
LBhGuUYXgSbERvxyaY0aEL9PKb9KBHYBGBcg6n+Vgbi/dg2wnicrHarhbcHnB+7Gh9hHsbk8ubzRUSLp

ppK9uZ/Ipu7UZFrA/QxXMVx9VojyLYGtCzQp0qnwVSsjmWw+9xWNz4qNmIW2IFrR5A0VSXDXVP2lPn4l

oB6NTB2ftTM74imSBpyofZCOJJTDZ42G6NCFmNvykC
He4qX7wGOjt2iT9/tCWE2XDRiKV4EhgwrKHAotPutI/I8rfW7NrkNZhm/xFfAxmgSB+iOvDSNdJqg8h/

yeFfpfYDMqvVN2bbbz6jj9IxyH6ObgivZr+kJciidxg19pZtR0/0vM7S7Cu5kGGca+GVQJZgFopYws0v

tu+4xunC5zdjqle7KbVIAxIVsauAAmi7EIk5QS/ANT
dpjPEr30gMeaLa48dmyARlZrFcKlq/ZgKmLMmJb8ciduzEMU9p9vErWSEifYFuV4RK59HbTThFRyAQxb

Rt6YJe02YfdXFDlLdyEtuu0+WS9ProMeCV9+pibfFkhn2gCKQ7WI/tQJrWu1NU3Vz7WY5ME4DTjkb209

ltD4bP6HEtSuxo3Q0KHj5a0bL25lN/w1rH2ejqLJdr
DLifrgx0awLi5i0DG2x+4hASIUvOqGgjom7Ebt4t16SNGew6v2JCA9DOIcEupxaCNyefI0c2MoNWlyOf

5Y5Ehet1gniQB+D+hzFqn7mnFX5D2EIqV2rwRUPk0EMu472r5qK8KnYwf/k3O9B7m4IXLTwnxQ7OPGMb

QdAZRXj3Cmk1m/4W+6cDkoXHpCfZoLzQsZ6mKQWky1
zwoW9dROY9nRHT9umwiSh8APc9KNUKcye6AzNJgLwnWjKZJbHTaCt4tWvWjIrrVP+7HoJ79NnG3q8C+z

OHBXkZWiSO/zV/wvu9WWW7hdLUYtDKyhfts/zqOx56jeKEyt3TxdWaIpBMtGQMFbccuuZRbETV0EgWsR

YYlMz/Wg1lwAIAokTirSeEFOdGBqvMNUEQdh74u0Ug
wTULUUMeo0YKL9i0c2x2HlVqGZvEM43aunSM/RdBexvz2ws9SJdPSqQ4mRuFWLn1/wBjWDeMg7s4wlGF

V5E8whQZdnH0cajudgMKpWKmYDx+NUCyvtTl+h207T7i1gGA0jvTFXr39FfnQRbbNAUTesg+mf36lbR2

s3if54OdMCj6KuBnIa7kFAMqY+jMoyVji6CB2kY27K
LN9mc8F2MroPlhAJXD1QSxIdHE+PwSjq5EPg5BhH0vg+SzaxrU/UVzHg2GUJsPbAl4YI+bCrtdo3YFSC

4nveVfFdDUh80Agh7cur0ZLD8gTO9wTtG2uVEk98amtzDTDjZURE4issbZ9Vzn4Kr+c+sv12U26SsOHd

zfwIi8/oL4DfcKKjLbnofjtLKHsTedQ7jLAvRIGwi5
8AoukGo88Pel0r2XwLgRJ9B5CxWEmgm2wtoqwS9F52sTdru/HU/KieIsSPr9yq0sjglKHF8tGB6aOWNd

zavqlW1hlPubUhMwcW/PkoA7HtriD5DzMWKAVQWc/bNvsLm0Awucisn1TaLIBehIZa/pmKnqclN8LdSO

gmwoQ8FrmnYeCqsg4CssRX6lWVVB+pJi49wnmUnoR/
u9cjnhHFqKzalblgvrMSqdjmPxqCTODolhMacFew4fUgsGzaRU6/h/KE6RPFimXX6XBKs969NF8FOA5G

lwh/ZEjwv6C765HTij3Lftseg7eZPJAJkqGLrVOgQ9GJpX0WjKQSSF4RQMzb5YuhgwO1lwHjYv2UzoDq

B8vTFjQPKlfyh/UBahkmjWh49xYQMevG3ZZAL/eCI4
oFS77Rml4i/Q8QDVn5dA5O7zbMmNKc8kVOzKpworLcw5lKFd01UTm7pYYAMWr0d3GIv/5SbnZicTxAvx

HWn/NX3LO9fu4xu4Ey3iZf8J2ZSxb/OxR3/tfYgv4eGcXUse8/FDF4pjcR5BELzoj1hWXcqPFOcMNqNP

mON8Bogm7ZUKilzuIfGEoBXnL2WLHU2BeL5tz9fK0x
XCdYmV10izXPX+zRhcNN79mwhCb2XjhrEIJtJDM5UiCTYzMuM4bWtcY8vwOrmK7Ki36m2j6EQFQXww7D

Zz01jO5HA7r0zgRQUcFXKwLSTllDp0zzI313jciwnu7/F07cTXV1VObbqFHAeCLTGBu1cVzCfOfykPfG

n3IWMF6QvjFWtzyCWxp4GN6WIVqCHkrftJWqAHE3px
AQ3BAHKhbeHGXuj6N99jUrtD0XBOjat+7Qcy/aaWSutHWevxgKcPXnqS4xs/FV+bLixECrz0zfHXhf0w

h5hVXwpJpxUSCrXhQFW0XUjDDORWGjww5udj2rbKszSoCm2zII/NkstJqqZw0HWtwlhmqn1ZjDFby7MQ

PbzE3VXvCq0KBgaoVqPN0MeYBaf32NXul6mCSHNW12
tDw9ZxytS9gbLgGdes5n7tvAhjpPmDx4bhOfv/NdJ3HDNll6qMuYnDcUWFsVG6i2I8oyMVZIC7TAp5u6

QpObdpXRSdfqhv3NnhmW9i/b3uNrS/3zUvf/yz51IH2DR28bWOjtCyCidDgbV6gnLdpAMtke2Q/7Y9D2

ANu7wXG1B0M03ii4VPvv4wSTICuJzyaaFI8Xl/SQts
UqiDmPzyiRfobJMA5tc2UVSIn+8gFnLJEaqfGpDGJh9ZD2AJysbuGDRPOmF7fCApWBz1fMeaEFXpNxgV

O+yb9fej8RshMm7ri183Kgs2uL8S2cjK3stdhplkc3UGvUQDhRpx7Syiof63TN6W1c1dvKvq2QWr3EzI

qr1AY+Ygnq3W5qzgbePVUiRiqrjyN5i2AJpBQJVxs2
0Ux8WOzvX/KrBLTdD11JPkvIaxQEf42jdtIyrRY76bEjBu5rsiywN4LPXbfO6ee51mxkHzX/CmH+A8rj

jy0w+JfdGHI2xxwOAFRobz99/jbouEJNIZvNrung+JfjVAVyI5eVw6rmxEFS0OfJ7djGomPo6/BOCWG6

6yOjFkUKjIFHDpMxF/KdIQXfgDNLXb0AYyzoP96ncz
3vUZERBB9zoUgSR+8eMdDjVKOMxLrht+5ktIey0la5djiPJ5kwQsRdkv8hh1ulCQDFaS6hQi/vYtqc/b

9Jgdo7ZYu0PAB2Ddw7AXl9Q2pP4ssy/eOzlgmyi0Jtr3iVz4BswwJ0MXABdibSD62Ef/vn90wL6Zizpc

xQOlFwEu6Anfvg5EDdy0SMJaepWIjgod44nc9xfmzE
3NSzMI41tzeKSFvw0BiFHPAEKBbmOEGen7TX8fI4q7Qfs3V8vGuRVbagM2cIywyW8RXiOW7RmrxXPUgV

VZEHXSO5W3E2+EYaeNfg9UJwCxH23jSq8DCNPVX8PwnfRvnXywGgLO7UY4qB6vy87yLlnBMo5XDuwJZq

ysxbleD/H5z7K67TlS8O8rIPJmB5VLoFsj63E1cs5/
iO8+AeauG6ZOREWiEB6uTKpURLLvShpBZjsYWvHTHULDTGtHD/NmjKbQBeUyO3H+9y8ChEaqJatWmpUE

SqTXPXSJGGJlCIQsiFLKV8Ka6+H8w3BQ11CPBS1LN+22SIbJVVSAmHMJobypQPuzYkUeJ/sw1WFg5Wht

W3G6v3Pwa8rn4SC6NWtOehkSAu95iHxbkSAo1HqSPP
WX5rim1cPYv69wkHFabzw0cimU7AmqmLHt+CK4980cZKu4WeCzTLgocUKAQ35UP+8h1s+UfQfnJKarSk

RYW5xYCtCWryPv1yUPcwviwjSkssTVUmBeDsfIqgx9W5bjJATLAEiTvc4Fy2KuUL0EzPzPOBULK20dvJ

uyqTBlcTrZ+8cHI2Ih4AHswso/gtpJE5TREY91A6o0
1z6YhUtz/EZMKVoMgEMoMJpEoCaumPj8xx4EUsLnBUE+M04BdsfjWqc76kduPCwkgh0cBB3ijkUo9neh

GHq20E9zRA0Qqt9zNpDkLduNqSdOsQkL2azXAwF1Q1yePK/hbcEbFGKYwwHJv1u8OmZBn4uyszLMyayI

nKmgLPOCCzIHI0wtLrN8Tr15hdLAkL0OyPt4iOfZSX
jPnZ+Vnz6/ztCg8HYeo9K+0wwEYkz8uG6HgudI3bYW6FxFc27Ik8o8Dr5SuPWJi9lHLGigRI5C6fAF4z

YgWiMiPqgaOIseWKHYQSI3xxl2HYmYX0QGfPf1dKpyqFKFu6a0uJ3nCIufX+nsrnr1hdLk/7ccfozPjh

T4/qo9hqpdyWStBBcH9ovm0IYyhP8b1jjd3dgCcunt
0reBWp2qcG+peAx3uYgG1PHyT7InCO6gzJXM2Vh91PNg9KcokDvFdTIFQOaaQa5IyYGwUlT60ha7L3JX

5zzV5RwBBc7wAdDgyjfIEMExhGasuznRciOJpkC1MftzybYU7L65RjhuRIJC6zU6daNMgwTpPKOJFeGC

5bzLhrfarh1lItmrHd16K760ttAtug6Id9JHOxL/E7
6An1NilxN8GI+cqgNzy3Et0twyGdBwLVTeEhVzqhJFlIAxQonGyfWcyb6NRT3PPzQVHd7Yhx3i/qYmRW

hJOHCRdazGpaRDNLAreagtPHTsWIG+NrpPu3xjM5Vrydj4E7gi/96oDWxOF9m3onhGV3vdYE8R9EfMKC

KndGZ0KeVMnMFqec63XIUoP6SjBaRR/loxTx0b3ZLJ
1xncgM0F2v4BcM6KbZAbuKicsl43ZnpMkbRtUzmFelPEbbMyovbnH9WWZnj/BRRZmt1sHFI3hpS1xZu+

MHY8U+9GU/8xdp5GnNQ9z982TDxITd0VdZYShzNjORGscvH9ESrOGC+muEmDBhzwCydQXeFVm68qbFes

LKqOyGVKUt6Am+SdTV+zGOU9FNr82nVpijvsEcuOb2
Wwdvnv64ILjj6G/zLkYZohM+lYvZAhTme6+Kn+S7YWnDKRRyEy4A9FVVUwtCj0ZtXZ8u3bIOs9ggke20

MLeH6tV8qU+Bokxy9ufnWRkVfMSN2dS4WIm6ZKT3e36AfLrGCEiz/STMCH0PZtkqt7IehAB/AVk91dv2

50w0SNp3bmc3oGfDcFwjpbKuxnwGR9sVAo/PUJhtbi
cNIL1mecLMwJYaiUd4vcnNlBVVaNMWcxiXgA3oHSq6v+mCjxaet3uca5JMITlwUlz3u8OH8d0m4ZSf3F

yrr7m9UFulk9Ljcd9FLltjLBkN9Izs3XFVHcOVva96JCBObSdnqG0rn9mGgTrVbnLOvpOYEnOKG2Erb/

ye7op7QJzisjsxq0f7h4qFEBS9IuvdwdqCfn7vSMcz
VuLRX2Qp+43o+o0bY2ckGZKEdlXwAcV1cm4figFHSCR5reOpGLUA0AW1zc6yQq5Ko53YjbyTUuEQmI36

VauyYoac1ISAm1f6dP/clpmBvMrGbPb5uupNXS6VkNEtCPYjy/QYyNAXkRAMb51A2a20d4FbKE43yGoC

jRRBpGpD2VDr2oGOAe3+8Htl1Cd2AWKfzRh7qiTChQ
TjRG2yvvpvCBd5uRpTz47qe/IXzbNiOrd6X6v8iIiN28ZU8+6qd1So370YhT/TJCQWW9qd/CKp+7UBb4

ayW//vllaX3j+kQhi9vqufLJ/9wsIc4D1+WRzKiHI5FFIZ0voiAmFqOnewD+FJAWIvuXB5S53UDYE7vQ

gLRBzU6nTvUvajea4w9VOHT1uzMQiNbTk7+W156Uwm
zGPjqlFx3fVkG3462XfzdQ2Is3iHRfXC7IZ0Yq1OhmytwGhKcD7J/xkrvRrCd2dgXnus4aa4q5Rk+mdg

OwCn6XQfANnz2MoOQr7ppbBb/IedSM0BYvx02NUOzjUh0hicEGTnh8kcBf+pd5K6/kQTJpDL35iaBcAM

VobJTzWTvdXbf9iIo+I3+kD7oP1PUxtZmiO83sv5iS
TAcICTUWaxnl6FrXRffq0oJuhg8gEVOpuVZMooqI/C4NgYETvuv53jgdUeT8oUz75SezMlhxB5hIU1wZ

3WMBUI1PlsnvMS3SJtoGnFvB7nK52Ovn07mWAuUXVPCE3BKfZA4x701vaHJFilbc6hYO28/xHRsTOCct

DT9xIzNsgUzgdCxAvAE2cwE6UqOCZ4bQQIti5qfE90
l68pIlJcjsju/zM2pd0v39PgbKPjOc6ZS7PEpkya7y35EvR2Tj3KhbkLesiXVjo5t4rJFlHPn6+9r+JOSE GUADALUPE

IO6OCDXii7gF3aeTPtPOiMf/xpmwkjJzpQiU6SXLs3YBehckWpnZt0rkk6Der47MoUCBxU/rHTUrZu3e

yPEkZRdS4469C1dBUrSLoAPIpf+/TdeKpdgpnDoo+S
8sBj42E7VSbmaGq0GVt1738qhlMwugmPt7HcE5XDM1mYw/U4Hs2ee45Zq4nrRa230Uvk9rvz0Bpuocmt

JjmhNu6YJMeScRh354k0uB8bKgSb89ret3FxzvTHVuNjXjETnZumgBOepL79F9chRZ9da6TkpG2sD2hr

YRgWnM6A/lceKZOjZ9HIDp1K9H2AswRwvNfLPpOR9B
lzqpiUs1715W4uU/nW4sHmU5kQfHEg324fbU+zm4JEgpAulbkQ8t+kD1FEJ4JA0NeMjWcd5BHG8F5YDU

nmlkjifH6+J1MfgLs2mJMTY3DdMrCO8WVnO/IQf65J6tbfo3KXFXZ6ckAZXBvjVW/+SW4WoTI1Uoxm9Y

BJzB7Uz23FpHTzxSAOUPQlsaodw4xPGWkCIcIJFPJ6
7SDzsnUtk59/SfyeweSmKLDS0bqpT49DnudD80BL16KpU2pFmkXCXyprEpB96qEjCwG4cw7JWXoujXiz

RhTMpUXiGQFj06vV/+BY+psn/LrlQgidlDcdbuXOazeQnZIdHAG3MQboRRF4hP+uCSdcz92+H8BOgIau

EmyuQrXv7n3rgT77EBJ8xgPpkdG6agUABdg4C0FdJY
BK4Raw3wgz+Z8RLkEtxqBBcGdRMA13dAgoT0FHOj1wB/AY662PFJ9jMuaIK2uxUhG8y7SCjS9oycjbYI

cCWCrj78CDY/ugNh+SqkKctn1sXKq3bUvCsKU9gmv9izbmCWMXGUJN4NaQO4VD9PPqymHgeBQqvv2S69

JPGykiYOXFPXbiVcjUb0u1NHaSVibKmTfjmRqPCiNf
smmNzLo/Xn5EDnT02Hh/sOI75HM7Xido4BME18pCdJSp4h2jeX685VH50+Qzi6gWVKRt0nikvWGNN9cT

Y3SjPN4E4+XnnWjOIUrNyBS5rWpJ/LR5RRuUymI46wiP0Ao8FqbaAWsOsvryWvs0o/iQFa75QznqmKHI

lSBzIkOyVzgTnm9IuMzOnJTQ3duRWB5iMyXtGLe1Nd
l/yDFTSNPXP/qr6YJt5Gxs/fZhoTXWVClNl5sotScqiF4V2pgy31nSNz1YgCYzMMTQ/V25T9bH2dKeEW

OV6Xh3pN8Sd33jMviTmFfB15AsPxM7IUzfUtRP8ojXo+JGfhV8pO43aHzrYAY0V275ygCDy/MM808r+7

YLyozz2bDX8DG5HytpB2gh4mcVWvE96vfRbwaeyxOh
KaRXw2OtR1ZMxYfrjmCt71IVWO2kA97LJvqJvVGIwX/+wpu1JH+9sMZrYaiPyuwG9FNsY7xnHVGmci0/

vJIhttT/iwo4odZJYO+h2DXtgIvIEkb3ojc8FSDv5kCasYjCsvJIULZpB+peaDT0+UpRi9q1L6IU69vE

n1+c6dO8NP4I3T3vsUnEEH9NSAVupZfixC7rrYC6KU
12gGbj2vj2mS5XAoIibsjgcn5UNVm2MfDGsDiLcBCRp+YQZYQCGM47DaRRoOkdwoT26imsztG/dIf7N1

qzvDCq3A1oZ/LaVyjGpSHDqczFpxbmpmnsd6B97q3UWLee7l6VWkB8FVp4KZugznRJ9hVRyb+jO0gBxy

TfUyp+7n2XJfij8g0m/eBKA9AD1THGyybdmYLDSJ6S
cX/axvBQycmyKyvIf+XDHeW3tB6gmKmGkrTyam/jKYHuaXkuYGwVVWBG91O2E4FTppRYtsHCNsCYYHtf

INVxy3+9eLbHyEzTPson0eqES0jcV3CByXWjgQZmU6Azi74OIhN4kJAY+BZy5AU+k0Az9MOcfDR8L291

ENE18xES/l23eMiHD7UZ3UqrfMpML7fVbP1mXEMjmP
HGzp2dKDu1jCKK91N1UT+TBxoowhzV2vZBt7Uo9rfUoGNJ0pK+l4qUSJ/yQMc7chOhTEhWPdu/Iy+oy5

KHqLCkmx3aVyn3Z6J5q9u/99vVyaw6gK6dkWZohiqcUAaG4VoKjzDHYcdyklWEfZijCccqJDCM3waJh7

bTFIDgl3wN0N8hz7mYdkQeBBlxgHOciImNt3z1RSfy
reo3E68JZ5vERPPa1BqQBv+QiZMTi7glLsrYYEndR5snw88GvPstHVxqMbtwJfWqK7bD34+VeAHQAzIM

KtZnq0X8MnR8NtiuNeuY+edjmUoOhAlmkZ+2sJv5Q9zhZDFZaSezzzFEbVsw6h6oJW+V2/chbCQfrS4k

ck9vP+k4bbNdkjd4v1k/OcTeFTTqNQX5qKF3dl8y+v
uRl78UQUk1CHhfU4NNYUWBLJekvvMklLUepneQQvg2nESeCSQkWbgsdYJByAXG1MupXkiKmKguriHn0h

GKSh3dPnzJI+lnLa1zXd5PDB0Y8uNgKlR65Xzx5TmOhKk4Oxo9FHIM2hHRMB1d/UeEcMc8hbSvz6geLk

XokcYdR1heEsj3Sx0QCK5vrM7icb4Un2wPcs0xI3wl
9ll+J/knWMfV0J46vSe4GDYKdXCqNeeKA4yzRIkxt27l8+UhgW9Fg9e+tPihoscKqCr91DgmdNfpS8zB

50VuB3HGC3B4fcgamNzlXrFhrmTo0Pr46O3GXHRc/VPaTW7Ub0l07RpRAzjsYJXMn7F6kf2QbFUWfNXc

sNN44vkeEcCcp9+g9X4yFyDLQNEnyCCb/j9yNFq8t1
jbx+5rRMLy36/5poM572nrHjaSaHcSgM931Z8vnRCQ4SZybE0li7h6Jf6AhAizL/c8jzGezGDAxAuHTN

qO5uUF2xFk/cETRBm0MU8Indh+WeGWcB2NQNbHMZOEo6afqra4CPPs4EnjkIE1Ogz7xNnDEPtAS/fUuY

eaq5IdHzU6gKuMQHWtJVuLOq6w+NqA7L5K8PN0w89G
6bbH8UwKQ2FwBhIWRrnEUImY0y8UdT8z4XWQWyD3s7hV7Mpg52L03ERkpaINCAcKsNuEUx6795JYh4Am

lng4EeEVUToht1NEOH405kDXXmFLyHleHLWfgxDH42lcezvs2h5fTVTp35q/rJWyRQ4cma6m1umHMKB1

o5S0PONy+WXr9WA3QMekn1Gbmku1L3ZnCa2ADv8TrG
/4WFm0uts0NDn5AVEDhyqf8hl9MG08D1sZDJiZKjsqdK+0wn1Y+2hpu4AEs6Y/k6HGDOvZgqbWd2N+KO

p8GbVx8O0rnFqFwSAIztAO49RrYvXC14yDaAaTFi2nNuIFYgYBTlDDG344ylLFwmLv+yTuYCBdp0c9R6

ZRrCm+XLIFmJ8jYNQeRksi0pOdjgamyBjOmHbcgQy4
by1Z8YHhAKX1wEWibiSkp4Kjo9IYuFTsd6r1qo+tIMeNm5OlvtNO+gabriel/iRJ7+ZPnFRvZ6xU1+gK8mDf

kX5BnEfevWEAmMUIFAOGtnr4Sza/mizgdKAW4MvyNWuUbiaq6Iy6sqR+AXui4VbmEVeshS11R0UCqzpf

oDiikkuuG/4ZtRkDxjIY+OzClSf8SrkakZgS8TrSxj
qYE32GU1rNZuSfy4nHDovFl5GirU09VAcaxpkt9xbQ7RbRX+VPgQSsKLc4c1WOO6sKr9NL/xpj7xtsP5

g/g10rIYC4BrU7MYsNGTAAmXNAsYqYoSP15ue3r0Dp3q76nlWaipM2tLEOieaMumQtiVrLEBt5erzj0w

e+gpZkE9643oJHlEjTN4RH+1WgpcKTdBQIsLdRxzCg
ZFeyvFXzBvx/eVhcFtyBtgJHUsEBeAk8RnqmoG23FhV5076ByssOtzPIK3CjH1URQNeZN7DP+MBe8Nib

+6yLGAqbOkMDtQNDz0U0Mm6E4YbjpCXEtK8LYfbAcGmkMA006+4JW8S7xL1JGt7fClyDB/DGgktqH56W

XFqsQjKJO4plkXSJ+XxnIPhzZ7HhfCBq0fcnZRBhss
bTnidV2WsfkB2C+dsRq6K503y77Mbakfi5IMtRibvXK9O8RsidJZ58xHADwaVyZa38EJO2kuRILPiY2v

vH6EeSpMvEx0P4E0oMJsPpcVw/sNca/S6eHTCnGM/fO8m2MK7mB1hjyUbBj3jEHUD04JcL9zSEezHND7

U1lgBHwMdxMswUf8TKeYoA1UxWbyiIs3b+jq1yV9JJ
sotpPcG/Oa5mOoOqvsycp83u2Os6ngNVz3WoX0TOrHvXaWmZLK2l4bp1tG/JE0ACrG8uLPmkYKZjfh8z

Nequ+aY60+aUhZo0RQckUS+tIOYG9c+ubAeQXbvjNRzSH0DEfUYbfEpZKrPBJ1sg+h5W9rzzux4e1tZK

p7aFCZqaLMOGCr9jOnR3SRCks/onMo0Thr+1vdILuP
R5f78n1hbg+nbnaNgocvo8fPv1dD/XsL7VtjFq00qeRugAYV2amCzJk3CK+LUtkqAseXLfWLzalee1nO

PsousUj3FsgQd3TAkfHMpTMliMPQqDv6HoauHYRtwBXds8Kxye9nHkMsYS2lBbmHkEosTlB3FzeWldn+

D7aFvRCIwvAQM+/zDro5qRrypVMatxZ7Nwb5AeKEl7
/DRyiwB4oXgFmxVHqHZON5helsfBZNFerONuxum/gJ6ll5ywqH0FeHd3gzcvWI8rp8ks65FpfnH0lK17

Hvy4pfRnpd1OGxzkiCDpI7tMm+/zM77Q3Jexml9vMjkbVdK/wkRt4ZPaPkid0Zxhp+8d+1wyfRTxrDa+

lz/pmVZgGoAws6nprjD9usLnY4Zrj0caVug2r6lo/E
2n7U+anRdxMf/30PSia88PTqLrBfIup729x05U/+pwCQLCB2YpAf0AU5J4tB5k/l06Uy28T+v6r8LBW0

+lOmSH29wLJhr0uUH+6G38yEZ3TkINvuXa8hi1psOel8fb54SabrZu4WhqmJQUeP6l/quYJ+AuW97D

KpDHD4Nsq3si0NkGE6InnNvBczYTX2RHgvpvMIMmmg
vSuNEviktDwi2IwfJkhCROEeP3OUrdcjM/todamFTB+ulO9t6ZRxCvXw1esFrH5qWO9ISuVLLxeyg/pQ

jVE0r1Fr1XUEwkwTd6ZTl4Hwt5YemCvTkOc/7sY1Ydw5Iznepr5c6P0pCW7gt8CBwdTWgkp2mUVMuduG

B1HUuesLqQj1fx25UV95k7vJHGTK+Fb/n3ow5FUTDM
EIKDqEVFepYR/2ZH4RX1plsgAyLs5ePa5WrSPKSOsbZgufDrSV846Vj/EdRYVDnEKihUIQeWTXzkzsQ+

Arv4gq8VnrnJy+nCpJpD4X0/9QmzHacrP0HDSIOpWt3EJUNczlG6LF4o586Op3mCaf3+ceA1uirhcufk

UrWvoQ2AeJu1oO9TuDMV+xZyl73dXRy0a+Z+EhmppE
oltPsu9Bkb1iDYOuSsqahxC8O6iPGnQ1X78M0ZfFzVxQCAdbUKtS08NVC7ABGIw6/l3QMkvQCeFeNgLI

HgKWO6yTLcj2JJftVNM4PPh2tHFbr+aY172OXyd+PQkGHuyjAVJkpNkjU47k40OTUlh7GmlAkHWyPcAk

vSWwYTAi+5c2kfz3Tpo1AexgE3+RDziizG1XXaTag7
Rn+kddciOstWEe9W+yaMC1uwVoa3Q0Ddco93F/nvLMIbLwFs6Ni4MoKQshjSvMj939u7Fb0IiuF7ia5S

kbSOpkfIeLpsZVuzlOl1g0vvaisVNJ5gU15KThFCRsgs3MoJH//cE/OeNBvLLVDX+Vytz7z6xMotp3jY

+4b/U5z3LGcVb+Yyyjyltbw5IvMuLECnGzEMgaDXE6
+EkOopiuC43EzZ4605frLqqXJyUXJk9ERtcj08HL+Xkf0kJNxxHFzxQ6ZgGjnboaZiIoP6I6QdEE8yvp

IIoVBJjsO67o3rKZj5y3tLkQ8sQ/p3QRfzRWivQ4/zxNCQuzJ41CHAKo7cuP5+JZFRdXJa944fChzXQ8

T11cqpiMnA8rIaDqVgnUv2HB0pknPiuqgGeCztBlJp
PNTHNMU1AwdxcowLxhTyQSSr9ueiC4svyvgkkpmoQEgG1dyYddWw7UxfmFWgdOE50Wq0NdgwcdYk2VHY

kCfDfGHU8PgDggjEKv6Y+ecdVqs375tRGeoQG2aTbZm0ogJoMjwCwrfOe8dEH1jfO9bgEQ1+p/QuuNlf

6Wa0L+ErCQGQAOu9Qdo5bAzclCj4cJ979GV8X0e6Vf
o8efp6LGBxHC2Rj8867YKsRiZJjuuxDQHaDWdg1ekcMH9w8AmpgHNXYtbqA5eX1/Q0IWVnBDflJpn2lv

vziXwVyxm52oVkQWBEV8P+31Z6JlAi1mbRREyAQOeBXsWXSsV6Mhya8iS9VmC680i+pI96wmQ/aB0sG6

vpWRPYyeoioM4CiG+bGEJO1DpqqeykXi72wHVzyJh3
pHVj4bVywsZTO6/g74j702cb42X3Y2uE32an9D6f01KXXrJ39CJ4fNmssAK4Fg4WCF3cRWuW2tnUKJZ6

8Ug5cBzVJjrx14iAp0BgIp5dWL7kdL806XakCFuxPjnJ/KKVvy31hJxdyV19B/cYcexadooBCiaChzS4

nnoc+J16YvAuVQIxCJjq/V9bPIHE7A5iW4w+vxHLZn
IuZDRkFEGhQPFfZiizpWSeF+ggPLdf9Cu4WFmQbRXJZW/fNmsup5486qUAfoDduUwPKXskOcpefqk7CD

uab4wef1sU9GQ72XERlhnCLOpwSZ8F2QC/UmHFr0AxFxcGej/ibg0/kZI3MnSAFosquqd5J5tvzAe51D

7BsPKgCdoJaou/Ug/btZx4DagptS2iKl2iWCI+X/qk
PHvuCiF/X2soKSOFO3Nr+WZ4ZUX1aMcdj5fkkwTRd1eWwQCG7s1uP/HaGEuUiJ7JAUGf5ILB4efKuOqW

RqMKOeyKzC/RO7C8VaodIokMBLMplst6IK7LyEF3U12LX+iW1Ukg5XuHMFQSFkj8lcVkiZwIc+TXpHJ2

rV6fZBiURLPFFYwB7Q5uru9T69HU0sQUEISzZVaj2G
7CUMGETklioY9D66YjcajJT1wpjSfspIkNdwJcHsf6GoYMkf3zpbrxbsrUfNex1yGcfD02ApqNi/5wu0

S5hqake7HPFOfdbSnEOwSyNvT8bbqHNl3/AwokhZyor0v2jtwJ/4weqOTtL+av4DSTbgg2ntx+iftMVY

xlagwjgCACMhXPyPJWKGaTYYN+rgTFQji0P/ttLlYT
Rdj8DWVb++Z1mbLZPYDEtVyN0npfYGDZx6xrvxNYatXTJv5t0Os36C/Np1WSJMC/5EbbokeeuBN6shCE

ux2VQSrEwBfRWz5l1FzQA+/Q0FvPxwWPFTyVa+eAn7BciNpPYcCVoh9sbRuvbn+ozQ6311H2q+I9VfZG

0meHO9Hkhqez/TkRo3ZK/Ul4vvLaJpswW2nlsi/58j
M1rhOFX1EKBboI4YAKv/MIMt9qo4TeQyLq7W9jR6XAIr8aNRrsO7olYbSYUML0TRZPWFGzlTZ0M/Cl8s

0mxKDi03iWrOlMnjrhy42oYuribvldwLKnNFoO3MYNysffEgJKMIdB70gWGVDgNJUCTMMGfG5UN4UttA

hyN95lUsnbJtAa9ogM3iVL207vMnMXNdFx9SsxvaZT
zrw2HbACOLcdDeZaZ67tCUF1DLqDUBhIRFiz6XOseB9sLT6W/KgHSlGZLeqNtG4KHiWLPAxhEs4xsQjs

RJzJcw20OpmPR9ClG4BblZyZggJeV7WWtB1geB+tEXiGxMxoP1Ru+v6boHR4saw37yyd54yZ8WZxkoyg

Hbjob9BS7Jq8ZBfwJOj6sl3+wT7TBSH8UyVu4IfjJx
rJn+pb61KshRoORSs3jm8BG5llyoslyIT/SkkdL6+0efeh/12GJ9UbI+Bv8wUw0ARped0bD7777xKkyj

g/2RWa6E+74KELioiGYZZlJJKEnVbKa5qNxKozPVi8Nz5hW7X0XM3uEDuS4PYdqk/qPh9/VwZk7dcMlu

YPfJmK4nEZLgB9J4bh57aVYblvhGgPFfdqpgP0bRaP
GK5feg8LzMkVHgeVk/IYwdC+0Q2K78IG8l0Jcpumjb/TPXp0lyzpV1zLppDAZ3m+MKbYTlU/7OfiwECc

0620EoZJoO9II7KDb87bjM76WHj89GkifKBDzU8gQSZYxyguJIOnEoGA+7pZ5uNE+fIlixd5aSsOVTSQ

ubTER7wdNW5PbolJDWb9NWDY1xHmmxnpMdS+0XIT++
f8L8S9e6J/zV9NIN8osY+gEi0RTQUzNm6NFmMXB8k18nmbul7vpBw5hOmQxDxlSi10LtYkI3h0ymdekt

EM+iebe6aUErviEresBq5pl7j+LybfGiyqT0x/vT+oiaKP8If4WEcnmjKYDPUA91DHfFAq1TQKH6LEh3

3675zKngABz1wMFpG0EGTNQBVKlLBdCyWmefmEpD59
qPUTfltC/Y2q5Oz73FYFmM4TS+i8bk3TU81SY5DZLaKUuZ1ZC1hBVfiC1k7R/R/2OrZNMf2o2aqPQvtO

ae15nFuFN5u/LQwx6V//OrH6l1Z4mKmx1E3xcUN3G7BJ7za/uPIqzpITp9GNPQQHvqr06BApXa4J9my0

Lj01nXUo42KP0j5lB9YyeyLD91Jkr/AKEzwYC3Zn3K
702+1JNzjP0S3ZuzQulVW6JUZBIJleMKZq+bblLkpb/cwJUbVIIk3RhfTWhg1hcjD8y88xzD7jjfn/11

ynBrVV94hwORWlI1bIGMMQMjJMcho8/H01eyebmWo/Lza44wvT/lpsnv6pUJedzhBxmLFgq7UtIg7jZm

Cy+dthB7es1Rhfs7qnHnMr3EncCUvxO2x0TqaBVJZD
vZhcH8fMvPr6NBjnlJMCOytBqNrSd3eUJwI5467ntE/Vg9KuW1cbwD83+6kS5GkTAYuGp2qf0JmqoRsF

gr6X0+NetSwRd/Wf5mOOo3yikkuabGausm+Sw94aPDRH0bBh6eUlMzl9+bq2rVhodFpe8gbK9z7d1IKq

PXLwSxdfAXkyquAZtwyN2xHZyVJO4R6/GEJDznG4zy
W/bxdnQqkqMujd9GQd2gZli4XAuOdTtUDSaoy98yNj7QO4RMuGFPmAeDex85+KJg09Vs2xdW4KD6xO0I

p6UCZax5qIY8f3FDFsfH2gOQ3uU2W2auPSBGJYKPZMLyqlEH00aVaLcMR0QFYaa6H6CuEYnxZ8LEDmB2

S0JDVWAc3kRKQCXdNCyStAqN6m5SvFUaIwRYICXkHP
1G704N4LXRZ7AWUJhSRk/882nPVdZz0ri+7ua9kSPFu92/N8c5z2fey5gkl5sd2m9d4x2kNz5x3kVhl4

oMR71Le0oi77CysFsyBy/b0ozXn1Ojd5eTUVajzZrY08MbRo0CUKWAdWXRZivToU0/G+5pTRizgWQmOi

ZlzNqnj9qaJgV2Pbrby2pf/aTx+51gvObXkGopxPw8
PelG6eur6WKHmwB0AbODN8vZQ0zUD2OSEbJ1Vf25/p7QwziOB2IlY9tyTFw53vTDV/U1IfwCJhJfrsVf

KBJq044kRB96yS/ULwmf138JjOy1Kuz/KgJh0DiLiou7ybIbuQrug9xk8Mp8vFeEl7AFo8bFYruwjvHj

2CBtz1WYtF0WTSUV/mTtRIaaO+OBGoxjHP7fWa1e3r
4NzGyggF89CWSjVCos18VYRK3Zb3wX1cPqu9217O95ol7wzJqBaa/DbBv7AnxU04Bg2AqUwUNDydK7/2

5tbk3+nOmStpw/cCWaTrtMDsMdXhd3iPDrMuJwnjDmRvC4PjQgmkXtAe3HLnDz64jKSPy+Lz2pArGKZ3

4aouCyBFxuz7yaQXsb2KQG24eOtRuq+NbwAXV/d7Lq
BH4yBSwrolvZbyOe7g94ZiqbgAGbxzqvyLvr1k9bwzr189V5arWTBSDCt5z39WP9UN7qHQc+Z0I60Tvx

mtHo+Jf1khWEk5NmmczMeB89V/EIfuWKm1iDltZSmV66DQJ47P749pHN08hxBr+qSKt6sl8dqPswIuAM

ZE9NfEvSw4O7VlfxQ3IIkc6p1hpF+0mSi8QA5is5ft
veRj/n9+ikC4Jo4VjCYDKydjZbFiPICFi88An9irp+/Na55weV2o5b1D20NGHzYIrmUgusQJbl3yGnyY

VDkvh8H3dspqmlrqrpEOzI69L9pJZY7f1RNqoAS+8TPrpX4h1ilQujXTcg2kYYkY9GJb82D2MwbsS0Dp

Py/1EOEtwmUuCIaTOCc3GU1f0uJKFffZkS+gHbdaZg
8jIY17IJUQg2gRGaKTs2B9GsvD34sB7SA93lhPWwUXG+ZOvXknvKZ/SOndNtPaq3NOJtZrwvqNzUHTmJ

1bA7t6+nH+auVqA5kKrTUsSGLqunY01CAQGsh+llBVASM0H4JhWFnvS09u9s7JJ8Hs9oT/2upek2rtgP

MWGQm248b6Nmucx0hNAaJ1BPtzeeKkeJSj7z4kYI7r
f0XQgrjaysPKXaJbPo4raarzio1XLqJab1/b/Of+sMgHaGAzdgAlaRVjmvGp/vHZWaO/k2gqDgnDQ4pG

GECZfMowsYyHq+TMJbgz2IjJB5uoZqsSmoAtj+cx6wEiU+DDykp/gWyg0fxamDb7Kxk2nvjSaJLYUXOW

FmNvanEgdzWr0PP+4XBB562IY6zmhYDYcigGw936Iw
WXVqxl72rC/oHR0t5JwKGREc2mGvgZ6WagvdkaBNCypYVp/U7eCVOIX1svLa4IDSl4O4vhtMf+q1tgjU

pbeILEUt2mOwkGFSaR2wJBVt3a14pOHTojxT6ukiDE70oDFAnfsFZKPUXCXysuzU/kFZawsRzgl4bCCY

E3WN7o7VJpWhtnQjYu7s8EeNKehagrTk7R3qw46lW1
LJwwZ/AyuJ4arJZp+2tlNg58gD5rg+e95ere8WyYhVrY7hWrzDNSDU08nyy84ivKRYdCye1oC6EkMVtC

n05qPzVeIYa0qo81PT2wjcv6sS4X5t7XbaylJrCdlTSTw13l4weZqAxbAyB4t1jkiuuQAQWgjvUI5SE3

Nqs6QwsMRdBdqW6XnhSl+4ckprOsw8AcJNLOHMNN0a
msVSTf/aMjyeZQcYI10yptzAfIKZfkPUTIGoFTmmK1exfoS4X4ArJJLE8cFHf8ve7sNS3uoUJXx8QF6t

6K59t96seeX4wIXtvHq5btzm+S4WY0xg6hJSPLLaYOiz1Hk20CWiUOivMgIdAAxdQ3ftbJdzyR/f9el0

po2LnK88f3HIdvke/tQliet4wH35u9vmlkjTyVBEWf
aP6HFwzjkSBzyXIB+8WuUt1f3ql3RezHlczo6ckquDDwizee4UUu3rZ+tJxBR3/RnpNbDtVfzyHpDKWu

D3/qnLFkmxUKzvISuDdGoo3kZfM8vm1kBn0I2cOggcYSN9uB4ZOidUCeVnJPor0npXBNJSVmIGTpfWjT

fz4jOu/sgiHjURcNYPvS82pxQ/gWLXnN72AAvL/YgF
Ux7FV4iAl/BxCChewPMK9IlFaaGeGvj33xfOC1B6KrzI/VRx1dQnWX7aLJHViTWqRikBzkRU1CfCzY7a

cJDGSFkY2a6OnWOaPuMPadavo2LxeC40kPkEFmZjHTyszdctVZR1sCZ/4DR01Cau+0DvUtpxRjo9uEam

0f8OBKz67iJfXj09FxCLJthRdbPf+fvE2brwwls88W
Tbk4RytiPYgqdPHToxglI3tnDsY0OzRsqlcYfgLDXa4Eeya0qu7LcZJBB2wbs4q9IZgfOYSCWKftJIi1

eLbe5ZwU1g4E2qnEORmx7ULgSOMBs0fi2Hso2q+9zD2eAz/U8qpeoQD+6ZssAcghviH7MVvXnAg7FDoi

D4Y4/XqRyq04hOpbs5bnOzA4QwP3ZeUQqi3B0A6j5U
TgLKe1sLS3jP+pDhv2i7Mr1jH4wyn9h2U4pGW/np+iV7nou45Bm6r3ucM2Wa6eU4yg2+Ey4Wv+bF34d+

qNL+gOBali+jaD4x4kjs1eqyZ7svT8ctnCAat+/B4B4mbfKPwHwDnCgqUNHzfaP2HG3VROa+T/4KQSlT

6DvEKEHdAz6x1VH5adfawxG2G+JnQFvobqxOI/hngm
F9xwnNzFzWXcIIDmpng5WaH9w0/Gv5os2fd3lmUb7sdPfy6bvv62VWMpAd216dtcLpy0dpjULF+hkQSZ

JfkwCjBMuivBOXb98bc+1az2hGWe2SDqVOOA2IkgM3f2y8VSb0GUa2VB7liaBatVPCzXe2y0H+M982tT

0fPRAmmvEmw+h4XLI1dJ6+nzIyd64SAlsxJ4JfhL/T
0VBqf/NkInxBDMg+cng5kRtzB/O/fmMWfzFTXOSR+R23470DItMxBP8jNRZR06TENSQRaiMFZ+Dd4X3Z

dtQMiQWhhyPf0O6fJdSYr6wEyDIG8pVHMcmEWniyHtIZlwSBUNLINZdah0SeYc3gKrb+W2EiCaEnc64d

jGVvZ6l+Ia5DH20Da2j1lsaiA6kEuh8MqrNxCHBZDC
1nWzquO+bGpNIoY1YSy6w5rACbUMcDA9+wATtB4thuVfeFepeWOZP1DG5onOKJryWaX8VOYGbVfIMEUl

KC16TW90yuUHUyrDj6LkfPiHi+AZtyUBDcUI1fwRRhQzKir4lWasA7clYMPTv5dvEnJJo1ja84nnW7DN

6znDm+J0Kr5CIajsa9E2xW4dd6hiyp9sAwIvqek2Z/
jF9nOguA4h7Bx99Oe7/9Yi6loEfPzfcdYb8oa8OYxj8Ud378cArYX9mbs7tjsEowzRBAwkffR/00tEkX

j5gfysuo+kNFMAFOyobRnNnB8axTh0fmqF+RkiJUV/H76z1rlLVego4bVrvjhCMsit0Re1hrhwj8wdgO

pdjXkY71Wb6g5oxRj5gDLWLoVW+2WI//HoIi95S16n
hM/hFsIflRVBNgdD1mI6rew3opywsF5oUtpmdZc0VGqY6YgmvY99kPb1EyKoPoIDkuf4epZr6fF6ckAA

QEgMoE9mGxl1DUfg+9q11tnhBL6UJg4f1DXp3TUz62nCoLktdxuUbDODo6OwPvrXVc13BYz8dKRwqxB2

TMLXNXqXVPiYRY9LEkrHEQXjeIx+P2VMuxlascUngA
W0HmtTKqWwyRSKX39L2HCXDIeTel7JN2oDb1SqIChsKxeLremcQlq1pPyloOlLqUde3x/fbyHWjlcxh7

jcYEXwuhJjvTNE3Bl2Vgk6+puhRWQ6y5B//+p8nwn3pIS5jNd61nxzZAyQeTIuozh3pjT8/W/kfW+BXF

GZUtUHPvOVbK//vFR+T/fxSWeY8QJ29F0fA7fhRLOw
VD6h/6hJ5Bi8U7FUoXfduQr1+8LhadE3LPXiNykyIccd1R6f5pzPgQ4ovHD8/3hhwhE7QdG/Iq26qjOi

/A8GSHdrGs1q6js0p8tnKj5cAfbw1/hWM+0mVPDLrZCKTYMgi53HZ8D2Ucx8XFx7PjeMm3BCG8/nSpGj

3xRP/qwx+8e56o6jbdH1QU9fvVFxo0g6K3DK3afVpa
9jYIxgHfU4m2UMwuJ/1+J954T4jqf24omDSg9pXW7BSzBpls3gG6W1Vo2MxkB/l13fQhDGo8ivqHKnIE

8vnBjInc1K6j1J/Az31p7cGxLM4t5wBRjHq/KO5mGV1PHyrXvFLaemSke7hPbJWCMarVtJUI2JN3pdaD

lQxHvM1OTm/mNfohRB2aDMh3DGmOaS3yXhuPOxhmeW
zA2ItHczwLUkZ8oeiMV6gaTiwE/kKrNNrtDCc06R4k9U3ASWY5yq/SWIWyU5ySnjURwNSvUm+PcAKlpo

XBZjRt/4U3lYETMZzwBCyjW47zpngqZlQDiH7qd/j880z+l6YmohgZh9ICo0/h7FOgE6B++ydgS3jtyh

u5/qRgOgXRuCNniLI62pTPlL8cMMsCB6IuoouttETC
r8vGMU83mdqaLZ07XEG8yfAsh3ftrBWKEQYMlGv6GTop2Bibn92PZSR5J9higZ8vyu3aTfjYxbcXXUGA

qQb1hjLNrd2+/Txqhefcb6pavNV1IrmxGflofOjv8Td76hY0ElhGA1rBX9YNqeLib9HwcAz0GeKmdFt5

wcidL0tYv4h22kQbjyhAehDTz79fOHsb3adzvMaZqg
dQk4CWG0Wk863Pe8vcmkxn4lOksM5BDa/desictH+sQw04CB4Rsg8r+9gPfwlwGsc/HwjHsugItwcua8

Z3kkPzLKMF3tTBPfgE8Ne/r2ugZ8X0JzsX/Aoajzum0hnuYGL8E/luzMmLr4p4CFJkwkE9tv1Jkcpdna

wL/chLqPO5myAiBJUq1Iy/MMW1XfxRG9giJinqGpG7
8sqFcIVq1zCAIaLpcAmjXf7gPAGQBmjCcv4jG3kXj6/0OU4Km1WBz96ZjI1Bm0bSszgdivKhW3rm0Fb/

JPXwl4aGcFY7oDr5vzbadskSZkj8DISD+aVSdDXW/RArt13MCiN05eXMfClXqAUCvKM5n942OMpS8J0n

xk5HOP+Y6VySZ3mG/lJQLlZ5sCQUPUD9xVJJ2u7Jsu
5iA5s0yGWqcjRjRmmlnS+4/WywCS3ipaYCtCuy5Qe9YjlTR125MSvGgw+PXv5hbxn5qWr+w9+4S/ndzE

4elSM3Gf41TXH3/45KQTYWCE93Wl1G0VT1ZbS4+LG/AGXzoGySkeSqMnU94GA+ubVRZfbOaPgDrwfs7h

VLk7BTjSuqzN/zVVraxPXCjeYhi1Mc6V+u0ARqBCx5
rI/Wc/sCeY4VZqa21FxCKJC0Jdf7mXWy9CkcePBuJAC8ifpJR7JFxCRqwWidGEsfD4tMNk/7d2nVkJ7F

JKJWVr6IqO01Tv53VGukIlp3/sTzWeoUXUBXpFwfHEstw9Xw2ZcpUq9t52YZZ02Qc+O9LOMTzHRtyWAa

yn+Zy4L6dtS6MnjnS0vPTtC/2wWk/WQcU+7s5hUOsJ
ziFKVUQJLjlJCPZ7dI0M7+UZZos1yn9kk0LXuauMWDKX3GasKKbIhXz/eKVnlTdaZS+Mrj/6diq2V9t3

Q8ZXuIBAMMFzUsFvxJ62DtPCbmBxiHDPYcv5fiEpFG4nIDiNhTV2jjloEMNiDgTW1Rm73AbFu+KuiPqJ

2y57PogkNESQ+qhm4v1xNOaniaLohHLe2y9WS0RM69
KEzvvMVU/8rmiR23JwiO+8DOYgVXQa8OyzSCj2SPkgwcELmcgfn3A3h4MHCC0rbRKwtF4Tj59fu4olys

44igIB3QBhcXjZ7hSUXR4wM896KRekwi6+6Z4PWGYYudRG2mzIOazNLYv39B7+MqVzKov4zKU667C17p

aqg4zWC7pTJHR8GY/gWWlmMtH6kH9PpcMjn29IT6d5
XE+ueuPOXYDqb7j1MMKR5+tXn+5uLn9/5rWogfmQcI+WR42Dg9QbBa2YWVDu+FyShSQ90XGHo5NxpSt6

7Vg6BDWZZfA030XeXTf4xqkxivoPhl5PQ9oriSkp5mq1NC31xrkRpxbVL3E6DBybV7TzMOUJ4Enta9pv

1nTD79pK5e/2Wg8QBdlhMmDooQD/Z//QeV/O2R4cuk
pbiquMybk+/lU5/4ppHO7NI5coPx8yaSYjrKmu2loN8EjyTn2tfjq80+o2KFFOvvnsuH+npBeAHcic2S

OVuAGIqNBCq2icMbR3IB9AeusYMePXlCsqoEkFTSUjzvNJqT8uMCFsJIEJTHGn/Xpe4/XDdyPa0PmgN2

txFZPmGfDRI3vdsOUb6DW+qqkbFt17N0slM8I58qtx
bMg4+gawIRMlOXF/GCchudKf2drvfHXe/sSxwdM6gzk6T6yI2EdfFat4kK2n6vdbMeWpNiSNmbxZWqkX

n9Fm2l61HEng2uttOwy7t8CW6V9R84+gDoCSXgCxbSulltMHwRr5FO8j/vIuP+dZKWL+k6QRij6/Jd6y

eHbpaq/aDkMpGb7NWsOUxhSOZOWydEuMKL2Gw516HU
fAxkQen1QqX1mxUlwff6FaNkwWElYL0s2PcTugTZ8XlJVYsWO9n71eIOE2dNZtyA+MTLCUPRfCazRwo6

vhumm6nR4pAOauyOrzsxo6P3F+bbySsRYeq8D0vTyDLQmgLomOFaAddl/gisdT14CZOwJAWCJ9dSxfcv

NJo004Z+r8xyR/lI1U5wpr4xZkn77jsaGJzESzW6r+
B6wx74zYYg9UT3CyZo73OskxNRiCWoD1fPM26GKEFbOF1HD/BOw70NKXuQ0Aja/O9BlTHLXuTTyc9bBN

nxaNfUXe0e3IIINeNlzSkD5ufy5YOufg9PMK8TXcLFzTuEEWz8A8mWbDAeWF5hvuU1qKHLdQdwg1tXsQ

L9H7+eb5MDbWlrehRjpnBAJ6kplExnwkicXDYb6k9I
G7zMavBrBKkVCybKZA1mLO7wqSw/7EwX28ssmEq6V7W67MOo3N6cccmH89Kuk7mNmj1dy9Qhjo48XT9g

e6y04AqB5DDArBL8hluH+iMBSUjaurpTAUuUNtZLIjbjU6HMs4KnudmV/hIUpA0KWu/DPDIJ0XZOrtU/

UArZytYVERCBzZA4s2Pt0SmDsCs5qb+NCleAvkTdi/
N4r+T2FSGgpSDQfsuBWLl55zAxZ+semm9O28bTlbA/MfhD2stNj2V2KsEOjVPQL4vwfBgsTs42lBBL/I

p1nTUN4OxLD+s05uejqSz9Twt3oGR3noEuhlt/rjhtP5lEn6JiBGTDHpxwWbF0ggj7j4qUgooaBX5XUz

7xZOq8MnPTtTB2aMQJXmWkOBGAhMVQA3t2iRL2mLIQ
sXlXZsoxHMWO/HC191TOEZW0DwXmsCxD/rpzpD2S6fhn6a4IpXdNfZO2SU5Tou/zx8Fqw6GNb+BVYmmm

exmi7wTUNm47okqV9dbm0PiD0IOeJXgcnSdqFuXShVmzRtJQVFT7DFoQ/F9LwqAAm6/0SY0W7rqxfmY2

E39FODwBkZyjxRLAiJBW+jvjC3VgaCV0onhoh4XEfE
hxyDftNdo58On4oJTL+d0E06zEW9Vpm5uSqy6NGanldnQHmKIVSkLZUl0BP709PjHZMyubyhJeSP+/I5

W2xdW01I1CnlyHDN0vlIPItjIws8SyKnUvMdlPz98Arm5pFw/zVNQ7On8ZffiQ+ZgxCn8rQ4Il1F6DaT

tsS915k6v4BfcAt9dA9zW3BQbVqCK3BLoZp2nhhrT6
l/Nn0ACL7GEUA2wTRpRo+rizWciHRjPNFhdbBzs2Ixxc7EpXNq9ao35lupMdo3PkDMKypOTPsg1fxhTY

cjtK/RmYjl827lMJ+tpOhWE5pxvsAkcA5ZwPtTb7EFtuxiRwR0nBoM0Puwsvx/bn3hEH4fozBm9hj/vR

zJWC9hSkz4MDX79LcQo6vz9qRGznAmBtufernFhOqj
kUB1+Wc6nkaBTmpKa3C7UZAGBIjFf9rnn8UNpYOMZiQo1OjRmQBCttZJbbXTBZUTF4WeFv5qGmOCjGph

qsPKDn3qU5/9OLsh2G3GSwY1egDjBOGRUNnCdgu4+VZXEUFy3xmqqBN6wF/pyXbSWd+LS+7kXfM5VUYJ

h0FTY5rcNrMRmjM66sLI57eJQzO5xvYn5CnhSmRUwO
Np9ee/4Pixstxmft7BWKfJE4540DKXt1stujl1CwJ1FKa3ij927qJHtnS24QhIs7lnRODpk0Rq7nU7J9

h8DXz1L504lwypEolwZOwt9twlrA7FEYelwjW0HLYuUDDdSAGJ92cN61766bwZ0+CUCfzK6RM1iLMVFe

e9EMSucgTx8rQg06tiWwmK6BIpzMm69uwqVQfP13L3
9yi7DCZ0JGWuh8yD9HFEafHmeKrRj60E8ymJg+yhZ2nKC0Ua1JFAkAz1RMJ+rAzpbv54IuGOZ4EY/jzE

9VDMB4JZ1OecOYAtYAsFM+BrQLmV7QDt1kkssvpO4nn6c83MrVvopC8iiPfPTd7cLPd3Tkms8LVMr7OO

MOiQWpcp0rG2K/MS7AdLQg26JmF9ZBoN7I8+ffe71n
Sz7olna6vvu3CjZe2FcUZcs0LF5LeKrIOLTP0QCHcPlxo4954LxO9uPsvJ4tgrnXOqdRU1hBocqG0saD

JHMf08lk3j++Cq2qbmLOqkbfxzF24vVEYVSnOwiKnU1RLeV/WLV6qYyX+2aCInM9b0Jaia9IypuWlvBa

iukTEY9n721/f2i7XZVgyHaTg9530CEfWNU/9LBYSZ
e3fcySA0Wfx1pv6LxVdJfVi1He42GDbRzycSCJqLFTdLkfw7+zr/9N4cu+copf5XSR9dC7ezGlRg0IS3

NwAWh/jku6i6DnoV2ax6bmDi4/iX5myRTq2+5XrOJSbLCLMIYzVia/r7s+uehqX/f83AuTSAkynzbDMt

hrkysn8AL3d+twj6fKrrSnGXBnvTrOE5oVTUAKa1td
grDDFkKBjunk6gibYg0wz6zns9dkLIMGjoeiGvDfpMyudhhcNLXHaJ/LckdBzj5WwMlykBD+Fwl2ML2F

BBF4LHgxLRuzhjGRnqXGsPRNquJ6mVmXGbDEeslo8qeCuxAtw5K3GIbLOIdi5s0McJsuvStqhnwVD6xV

ASWcACzAp/9weD4vqgfnW0UeT0izsb0lGFKWKNrkGp
17fRHb9VNSu4g2irJ0/M+Dus9OvoeHgENBP+A2UWyYzUQxDOeNK2n7JMPfP4KkQiKqR+KnZ+hcbkOR3r

GteVj4h6sCeSAS9ILpgH3eL5fzBs0E4FyQymShKBe+sr74BsaUcPv3LYFdhJd3VljrPbzohrH8mwGmZE

xo+ia7MgOlJO5BZ2eHTERgo9YRxqnQyMBCB0yE+tVH
7Ja2AVhMJljy/3Epka615V5hBYsfpaX+r1q5wGtfwLtbwVv6iQd4O/dgHXdhSnTczf9FDSdEwaXE+0rS

FsdxaostKreyKS5eqr0pPUt5rQVKltGswtwBnHTvQavD2DqsViTyktkCneeEepu85GUTOEL9kS1oFdFD

ZU3O5CNtgcR0NTdFmo5kK647nBDwe4MYQZ0anzFGy2
sAL1wkI3IF+ruWZf0EU9w09pALv6rqQ/SW1mMKfP1Q/YSY3ZbAyxbHtOpHbRXUTJ/nrsQOrbMTaLESXH

krT6wWstAF5djSVuAUqVpu3+BBTL5DrVa+++OnGROwZF5m8diAatNVkbFm35ww7AbhwcdZg5P0xnDdb4

PlTgbW29EB2AmqpvNx47gsrwjrbLn5h9wOouw0nvEZ
ggSNGhmd5wVbFn7TUkbEFD413NJCZEViDm/gLI1yTJOwcjoWrJrDG3mh3aGFod57/Wp6btY+BD6taORn

AEo20Fmx2YtiAOTtzknnAQbuFzcP6yzvcqkcwWJiZIzhxQkTQ663/B0FWWaWdkjMuVFdtO8uQiSU1hc2

nmaFUdzVEMaVJfyfiBMKsay6uORGmFU9/FKP+ZSWra
8d6iYQut8J080yJ7q9UMeTyF1dE45bIBOR+AiqQFf67wcYdoPkKDOM5i7eoxHlgY6Eq43NSfQ0B2hPTe

2T+MrmmtRv/q1n/FPWFkhOoqa8eTuXWiF5bVlgQD3HFXQnf7aklSw7m5aA9LdJlUsrvxRtJzrfZc8thV

i0zMtzWsz+93UP8w/BHhbcvFO1P5G31ws08F99O5hg
TK4TB/CfSck645w7FaxOB/ZeR6ak64vMnHJKtUhq/dOM1nlxynG9wvlLZ2WhtH1AARAK0uQKOnIYxevs

FOtY3YWtT1vrYNRYHLuPDSv+whAaOtFIlqkXO/W5E7k4L784FS0BFs7ArOCV8fejnVQjd2zrm/kMYTdj

bSzk6LAYKGY6SYdHsYJMdj0V89qYda3zIVINxwB9Rj
7vab9uACOlAMBdjdXoPF3Lp+nM8vAtB/ozvuJGV9q6p9HX+b75Yj6wcg6sFruBDb2ftV0tosizgTkUTN

qRBpOcfGTNkpaizyoboKs1iMnq8jECx0Ly6mi6w+X8chTYIP3jrMW+jwJiKnrspoYxxxrDhlu/zGe77E

fdXRtiqdhAmJuzMf3z0w7u6+lI9IwQwNEbOgqHo0er
2xSAQPVyEILxYqLA3CWaKReCrGDMUNz8DbtyMMrcF9Q/h/HNNfdnkXaYjuvCyIe3P89LmpVY8s/9+L7f

8oM+iLoX8nDvUyWdFibBF10N7+Wx6m2a0ER2sCiHNsoHpodEgj7u7n8K5Yw571/Y2Yj7vW7vn9mNkZuh

AzOLSrGNWTes3fRzuyi6gW4OAWtbQZK8uLskApkwsd
ZT6K06O/GsslLx6EHUM+Y3iByXkuN4q5lGy4UAAkNNMuLTx+VHsVHDsfdmfFatiTeJRLhpJZ/E51G5ap

iewRovVfbxkGCWKz+5TYPi/SJQRB3EwvHtGbTTXPIQ1pvWc8kQtFlf+HZMInmIH9t8ojeIYqSmnVAI6p

GmrBVAIG3oJcg46LYMmKoeuKTQrkuUu/Vsz8gu9P0N
8GLaaKZTUYWVtFRmGw0hEQFV9vujH6Wuj8xlzcp1LqFy9X/8oCGjT/TjNc4ErueDeiAq3URgXiNVhXmp

SfKY1CXZ58mg8OY0kkVmzHJwbShVBf/A2y5U0XKXb+/mrUJnua+AqxJVQudECnHg9aUc4l+MfZeifoIE

io0V44c0EsaOpM/3phu4ObTGSVi9KPtsk4eo9qvhiQ
NFk0S4iPytymZxxnjZDUPuVZ016hZaPa+IVtqMQC38PfEdp8V3oSyDixwKsrAvBFkogP1kdxYzeDpbR8

ZdkTnx6cLlDvCnOjrvR0mcJdzVXa71AIRwly+9uXIr60wCeKEBcoFFjoLLKmeDb1FfpGaxZ81K6t+VH9

ulWUJ56/HxW6J6h6oGTTYft7JK3hTj6dHgsP1lQudz
d9ALSUXXmgBKlhN9XkGo4FTRbwanppdrCPMtNKiHqqL/iVM4WIKAYkAJwdBwGS14KFi0ig2yKcc1qy9X

5jqXKqFnw8k5CQ+RfYUBV6KGbN8oLYz1Dpl+MhpLPdBTCiajk6FG39bLg1C7R7kUm/VqyXLVenZf94Eo

dEr1vpl+wMkjF6J1BWJILJGMaCo3x9dlGZGpb0wMCq
VL08ATSOD1pCQaBXv6FhC47fd/nDJbW/ZIbGDpL9KshW5hQbMOU36u+sX/y/EBAK/lsdLIrzj+94pFRd

wxlMSI5LeRRIdaY195Hwo/tY6ChLuX/hFZj4ZV2PRLYlxWGg5X5GKZjrZYQCEgrb9nKVTA2Nx2r5DLG8

YOvIieBscaXt9EF90RoVt0Fth4lVk0guNKYwyILhtP
VvvMUJ/ENjri7nkQCEyOuFV8DNZ0ld/H0UULNsIrqbT0T8hvlMMBirsxvdSD+yJtqrz5+3tK9O6uv+5T

iEE/ECn5Kzxj47WnSSRyizwKaLb+4f7SyPdR8O3cCcS8laiISUdOU9/5jfPimTnjjrF3cD0H96nZRqBY

8NzJXw9M6bVQfLyR7yK23xS4twv00uPC2tO6LPEhhV
A25pf3Yp59V2oYBgMMfF3A8Y+tApZ6HbaUtU97jEnas41wSEQ3Qtun0vO89+rezd3Kdx4w9Vr3d3gJqb

m+JWNSc+AxumPxescQMg1/a5wJ7JHeN2bSM1LKzcGz6mOLsH5jYdA+jhRxT9wDAZqqnfKcUf4rgE6Er6

Wjph2OQ4uw4dORx/QMR3ugZeBkhPxnFvy7t3LteFON
LvRA8AJefrlEKhA7HNNoA/2ihf4ta3jL6Ca487KRJoc/XLp/ziKk9Cntr6ocLw2GzyCRjrTkIXjn2ypp

Z12q+MXZ9hEuqQpfSvrk8R5n4nVU1pZaZjj3YteUIWhQYfgssKraLCEHBlPx2KDQIMh53+JYR0yP/W53

VDrEoqmOoZSOxfwN2WEinIm9SatxdLqGA49LGyVhw6
mlsMrCLGA0hvzw4hzlJgAyEt6myRXC/i15cibqZZgckOwIcDIXfwOlcqsnEngnOqmHXfP2t3+5U1ivsl

9Sd5R3CuPaJ3rbYXOj5C2jxe7+PIh2ibWAyoUkKt4Bzb6bNT41OEA8DBKOKIvoegWnBiE9Jsg4vMg2XQ

+VjUX4dqrXwyPNOnAUaLwkfW+19CbkjWaAYEaSWnvP
QRTvG/yT73PHNP+NHvWzeA/Kj5uNGRFWJ+mHWLw9dxl+e6T8WCa3GFiQkYcqyO48l8WXZx0VWrtWYYul

YmhkknjgmrRNmURHZJrUB0iRuI3hbG3M8YWhelCy5oaPEWf3qtkwn0VRdi2OaJqFmrtWs83CHhPmImyt

ghndpzZ0+qT4drSoJKl3hv7hGc3Rb4bKcmIEE/Ee44
0822b76aaKc+zaejMLa1Z521Kcbt/d/Br5kxDC4h7DVi7Y0xWq1G7eD7JXxUE4062lHDJ4SWGERvTjbO

eXlPXirBe/qt7gRPytoLdYP4JOi3dzUA6L3FCBl6BC6xSJipSlIHY1UUibuAPNBbs7HZ2PYUxNkk4SYJ

YMnAhCuvO06Qj20sMp+b7HKzj6/wlMGvqftAdaNpoj
JIkd7/J80ygMhNZcLKMKyWweqzvF+NzLT2TvpvlUIyFpJSjjSuxphN40qPL2FLsa285wRvE6/UZ/9KWb

MC3S4yLL9VqxY7UDzap1Lkgz635m+qHdr/46j5zoLXSAwOajH31adZfBW281x1BEiyaBXdmaFxp62kup

hYOXaPCCVZaGOh+Wl0M18az/plu0DkYFODt1HRp1lN
UAYrTOFmWA1qmbWaXk7L4rJBg5+w5pc190xxoNMSwCBwQdimgq/6SwK0ITNjBa6vfL0M9Qzz6h5CNRRZ

nc5a0H8JAPd/a/3Kfhy6Pi5WGqh/H9vCTp0igLPV3kruuFIef/V55nHVezPWy2Mkm0JpLfQy7+sdyyCB

l8HwbevezOg66a+88Dw2gC3+F3rT6+q8LSqTpfmZXB
Iam4q5MiSQq8HoCa8xpDrRGn8xyTe1rv5v1Z39xLVfeuIXGB4FSjTZNsONlUjWyTyALVR7rppp+FVUSJ

hp/QH6uHDA6ChGk7rMXRahNcjoWd1UatbdmTAK3e+ik3BKFinb1+RY/wq23+C3fDL21cHLui2tVzQBk0

1eHHzWywokcbqX9lcbbAyiLgiaCU3qasYtpr/V1+tL
7ar8QFUu4LV54dCRtaCDSKZvf0QDgFMWiHDKEnjfKMUwki0vDM+nYws2WG7ugHOh7ASLVRZPOhPjLIcK

Eit9fSTpuZy2sFh9U6BWMLy6Y+NjBUnL7ZbwJmA6iwaXDTpAKNAiveYQQgbtF+t1eSLMP3fnUOWaT4nJ

Yk/ZWitNj/++m0FmTkLwXtLiQl2j+9Fym6i7KFqEbi
B3ihW9VPPlnEv8mfomRDI6vHUjZErxeMoJe9gSZbqtLy9M/V5w7Ip6K1/+ovXT/nDeIuILbPLWFliQmM

I1KJRKkmid2CaUIBeVfrtjAVUS6JcaJ7xd9/Ccuf1Gjwl8AD8u/9atcA+r+8wHAR1gXH2MqeIqlg1bPi

vBUY2VvQ2vShe40WMmmG+BGAYoSmurBRsa7+kpNCrZ
1PqgTR0Pbw79YMXEe3AeheKCpJT75uDxt17rdXDh1TApUdiAjr3kS01H6UyObjSHrtLEJ1NV133RsPw0

SMjCJXK+nAGpoqGMQO9++pLd8Iqfs0b9KVrmgzotJXQSCFK6cI+qTV6XKzQLD9XFc3gA2zbdGwHPgdex

RV/+ul2txp/lIiMIH1d1Z/KLgDHfqDBrZgPbWfx9Ut
uG9uXcJw5MveGYuubR5zqJ0gdsSfn5U3TVq8vMKthvzVkXmGT+h3fZqw+F3FXwWxpw5ihN60gIYzfr25

LYKxxPgpTpH6Y/FC7k9jiPdU1vV10E5JeeuK9PDQEivt2BZyam35HVXx+4ppGbAN4SftbbRmhKGdeXaw

t1mZUKhfPsaNHuBaNI/SlBrfSu5KpuCzLY3iVlEj3u
u5EwC6soYyh4qO14y3m/yYjnrYgsEXblGAl4pj5FuWDApwJ8QUITg/69L+fii7LqW10roipVoMso7wzY

e1LB/hHafGOoxOTpHO4JRqdOnWJOxseMQB3w3R/UQ2ALJzbO0yTcr4tuMuY01rdPCcN4AIkAWQBPo4qH

mj+FJ+LaknFp8SrRRfiFFlbfioFVEWk0nRstJP3kgK
hw6tVbbQqOP6obl9nju1jZImqRMvch2Kce8z8R1qN4kvazEMMU8MlLu4yje1lv95lSYCy0Amp/gBAkph

/BOSCF/MF3TXMJFT3f61F/eKuM2/Hl98cazyb1e1aAYvCn+C7d6Za9RTV3bULUn8jREjpuGLqFtXIL+U

lr5Kpq7GqsKDAqneB5/FcqJ7npjIu62NMliFwudihP
XnuYPLy1njBf0I9hUEEXvVOKnZErr1bEspz3Ud6tJLNtsq5/n58r1Vc6L4iPuN35Hz1u9PnGJYaF7oAw

GvcAcLpyoXjGJF2kPbKfrOmSXrs2KQ1ozErU5bLk/Ym8AtMPC6FwN34fpIX7tUTgb3HXTeu3lb5T0NgK

KjL8/siwIYvOdWWQ5Y0VUjothdyZm5cyGkIy2gXXWt
k/S3qsJ8IH11WRIX6mT+FJVx40CFzldwo6+tmFwj4JmgMueI3wrHpyEOI+hD5rBObG6G9RvGcIYEI3RA

DHocvgJeLtEayTts362V91Gxo2dJJCj8/+pKi6XaPj/6IYqJHcBvw+nBzy+uhNEqfSpmscgvNoZNsJWq

pxPmVZTIYNrgW9iQVHFyUZUCzaaYzSZVbczBktSPe3
hMDqkO8keUdqhroTNJEuLbiPbGNJbQYMcDCQQ9TGHNXspWOZw7MPOLdEkZ7PxT2wYVEscyarZB0e9WxV

Ckyxr2NswkLMT+UMrhLH7tT2oXjRwKnvWf0X3e1nzg0OYRVmBtIlZHYx2g6Wup02VrbQIteqU3Ot40FD

E9RLnfsQudYQeDKR/3Ba0ykSu/ANYOkCwv/56Lo7ZS
hi85p+NqzpN1LhS8sgiz7LXrgz98ob48PKNJ+ks1vbPJdOtn5qSur90CeCSEnYbpgEvap1LC+CVu0mZM

0xtLzkSwfO8HjZPsZA8SsUDfij1gRwjG3su7daO18B4Ae87anfeLgWVXekieY/cXLLG9Z1HSwLYxS61L

UM4ZtJddn9z+CMRQnqbat0nf1oXo9rOOkJ+4ASO7Bj
gFic5D/nQv9aYjAevTGqdNVpwlzbAmrlHv68UvUAuqn8Hn6kcY5OMs3z/ZH5MYvSoTx5WIWoTx4HWxsz

g7+clTpXAquK/2pKQ6iFxPMzFRrRFCxighW/3a2d3pM7Fl59f0EOAclcp9W7W2WU9L9J47XBkgcItzx1

84CfregoF4VjAEHjq2bV09JIfLpkGVxtnKtUsRlbu9
wCfPC0RSHtwAYliCSf8dx5IixrcPhEVt8K0XuVC7h4aSp0b6+Mx53ooC61tqxZ/XcbgHM0ivsUzAEC/V

v64h+Cd1MqK07i3MR5fGT30151z6iTE7araIELjgE4ECsYNRSU7+FZOEWBWq9uhP+ObGgJUyVG0orNC9

90kAQi6cdwNdPX0sXXYfy7o0Bc87JHWc/5AxeID6UN
pvPo5n0DDFlze/CaOeqkMavuO5rEjCw3ajvjL9a0jIzlI+0Q5z3iOOfPMw//l9yEOJYaFEthWO+dUVtF

JyLPWUYoGZteJlaHW1wto0pPiGMFKyTFToBrDs0VFFLfNSc7RRFwkDBYXVen32JDKdZrZJM8f3Vby7NR

xJwJA0hdJ7SZyTX5++xvy/UaccxVWQWxZ3cg4gVw5N
n2o7UF5zHX5+ddyL1HgcqwflvKUNx8OLjzF0karkpv+rv5g+zOU2+oAMoU/fHkHeX7lQ6eaPPOgPPxjd

GvPbqsTVMF9veVBLX9/MvNcb9D8gNpMc1I4r3MZAF11I4zrV3iIbik3IhCxGHMyQYdPfk2VTl9ut9llb

7W2FRg2mCaMwTvI0bGiEE39spS/9Yt6KML02Fp4zia
PGUCUKxT1QVG79pivLDI5S0I2XrZmOlu8FhZgfbF/qKYK633VZVoUjoUeQXdVZ/AOxf1e3pwrFxyOTT9

xCWQPB3oume2K4mJoncLf8WEesqUVYCh3sU/Mx98mDpwZfLW5obF9uiQAA53Me0RdEecYPpKCoO+7y8z

sKdwovmKY4qi+nH1PzfDjfwWR8jP4sWorRMwkE6uCm
07vGmCpzgiv6NlD7GZOrG3oTHky09V6z7XzTaGrLZWhAPI0TpMb7VHdLG6+3S1nExdGi11yPlxDmd8cI

qU0GrvmVLy7ejZE66GIgfRy4CsAyOCfNztSxoRE00+4R22D/EGBz1B0vttROKHgYOjrmJquysliSNck7

ghlJ5QfttF7uGMbYofe74vhMEV3n78oV3vzQzCH01R
7hH4ieFIa3g1Yc7SL6dur/lqUvI8ltXcz9EICxvtqUMn3Re+AvwhWCLPysJyN/G6tGOZ7jOhvRtpeTUU

YV7ErA134Y5ZH0djhPWOXwuS8rUa5jltEPmjdqaPe+gARX6/DUL++ehCTNGAPAZp53zLHnRwJTQFgFiE

q+AxbY1ADIm7oCoCcdrHh1US86p+s+a7oit/homp/K
fOR3cUGVqeza5FNRRf1/pw/xeTU5FpPeY9i0s+dV1UuB6R5z399lnMx37T7lNS9kYy10u4fdlVe7hY2T

tUGHtxkbZEiugvggIlebLrRuOmDbW4+33K9+pKRUFybxEDsWwWD+Esbn9qcIuuZwYcIaHvDIl266ZE8V

XwoCQbwt4dtu3yEJRgL5x/AhBVs9+lDZ7BOUAhuSPt
3EmOaD3VhszbJPcw/J1aTsP125WFvT82i0luK/EUaTFvEd6I5C9S0SCEm7wE+b0CxIsBYiNs6RDs+MtC

2vZ9i+LY3F0Y+7sF2vEVneja9f+ZGfIT9hDulGFhkklbxvKk/okcjKludDzUJEJD5pkVYxgLdCSGTve/

PuyzRJ4lel+recfd5d6gpCEI6iW+f1t95bdGxF2yk3
TKd83rlUfTLU4OXs8U3F8c1wzSAxkfHk62K9l/VfTciDSN8EWuFTw2DeiaDMKPTLauIxlvr04fHxgtUz

iLTPHKIY2sQfCbhDGyrcr1LlfEP0xJLGGU96UkZCFzAo306a8ypH4fGjU51tc5VjWGRSshxoH3xJN5zV

vqo1e5juo5xjx4dRsq0qqCWtajG+A482JxGgU6niFi
M1Yb67k4FJKkHkmPKYQkTtR3M13U8ciXw2ph7VXJg+7pJ0TYx0m7/UV6HX/2vX/8D1Y1wej2V82GeKt5

Azx0h0vNuC+zaZF25134zZlv0PThD/sizaPWJ1+2EBk7H0oLlDiFkQRvaR6xNa6/Cvh51uSNmEFoFl7b

IbGikvgn9TMgtquHb84u8i/qROuDktlSmQV4Ts7rCA
7Yhdj6PzXd1r/5U6g/sA2V9N3vxoS/f1/TngklwmQ+a85DZRnYV7bAc4i59DY/TUVSakWzAJI/F9oGzr

r4SFge4ePpLhXkL7D5KddO9afKl4Ur8LvOUEpNkaKhSqz+qvzhes5ICZETS099nnbnH7OyVyo6hQJpfi

ASmaOn6Kau1MObZF/o+5AmRxqhEPfWEyYwGeyMdicx
WXxxIek/xEK3Wkx2lUvHhVyrYXKvBOjX/SntYmd3nYlR16qGRlLgmSDaTa1oeAmj/4pMpSvpPkfOSbVP

xRX4+Gk1aX4o5wu7kX4Wqpcl1F3kAkzyX1dmpp6VJ3nbgWg5i8j+ZK1zXFdDyuvMdujix+U+ev/dVnHS

ZovUJyo/zGyvNwp5tshm2vb98xBs2X3F5w5GLRwhB+
THoNZsRuY7u1r4pv8HC0reb+n7drV+5ozZ7uIU7bzO0zf66OV2U99wbQFBILV71/iqeq6ltbRhUhg5w9

imTwjr/Ac3lk8D/aiXXqC4jsdRT6w3Mdf/RAnI7yETyF6+fFmzO58/dxg3PUft2Wp33MtEMt+z/4gsrI

dbKrLcLPYjW+I/2IsM+HqZLX+6uvinynHlDlR0Z+2F
tVab6PK7jKgVqYD+44RrgSKUHE+4XiCnIV77tWSTBT1sGla1BumFvBJsLf9MdON0hF3c+tiYx+Wtgm2t

oh9yytknCiokxuZhpnn81wgqUCfH//5A4NtJg+0RlCq7aeXMG0gt8uKD6ndL9OxU/4StTVDN7FjgEFFQ

tRFWLLqfVeU0cvsu1uiP4K0musFfkV2lTHthEHMIrs
xlJGq+AtTaycyvawu5DWQExkoyuMhZLUsE0ufb8K8LSK5WGBHytyi5RSrpHDZdkjeQF4N+mfiZ9CmsJY

LOxpa6qHwDOSyT4h7WTak13wnLmwAq7LoUPCs09KijVJ0mIahiJniUZ75r+/f9hZge4oPWcSxfdXyzGa

FGbNRg6gglTWltPRb/TvooBJdCdkhbH/gwtSQWJNuJ
eJbMug5Vcr6uLVlcj5erfCH8zp2jYhm+Y89EFMiro8Vgl8MEJ9Bb3jzYAKM6V1yjerFZ++eOsUk64i5I

8J1ihDewZanTuSo/0woQE0nwuA4MNC//FGw7JmrsJQC85znCoDMsyUH8+CvH4gVaUOWvEB0ZNCs4aNiI

/vv8mlaalWwmp5T+2+C08dvmluV+dFvXO5B814va8Z
STcXn7o2obiM9kAaVS3gZeqJi1sac7/z1CLckAPRAg/bpv7EMLTv/DOmZ33IwjIKPOm2hw7Jao71VoW1

s6LL7t1Wowmk306AMgx+RyQAy8QbDh2XNDhXFS6zeBXDt9NtNB1j/+37riBL2xKJtrFzFseZADxIf1NO

Kb1kV3Sx2GQg0H8KZU4hPmU3ujrZ1/PtmbZpOP6cgt
6OUb5oQoAfPnV9YuYvobc9aUtn80qLm01q4DaR5RabGJSNsfQSK3yrHdt8AHM+g/ewkhtgND2vb0xYyf

BQFIb3ZjTtnZoj1j20s5JhEYzM9I73G4McfadcX/oCh63ususeBHHsvpXrK7X3htk3Molhvpi+FJ7UfH

/s7USds5pJ7E2sllFIefU+QMuuoFpZt8gZ3nhyf7dj
KmSdvaKKfSej1oeHKNdQjXT6JwBCy6vjb0EQTJ+naAqtM8nB2PMyjwZDtE39PdEkmiSSu2VeOb5qJgYe

wNMMc6AbpgqYh0+rs/Viet/TEhYv0+LxHfkJI+OaSaX4bb/i3cYjWASrh6d6+/RdMM02MfnznIjmRIieSF

ZH37DR33NHyJ8NuNG6iP/BN4ctAGVfYjd0jithdeFI
rD0GsUG78BJtceD16Is59qo2kfnH+gCqkRkz25+LMotEw2tDwAtwOao5gplztNUwBByCyZ2Yxdqx0SXu

jlbpQAmkXMwldPrgXbIkQdwnVcq1jbqRkxnraQ+nvOvnSuSkq0nUZjzAAJ0sh5vnhWbbaNRAgvfwFVyO

uT9P1G2T1izhSzLLrjsueiQpe/pfgpL3/p7W9eVxyO
9bROHAM7KI5Coq1cxOFrPqw1vXYXiyIz4o/2o2tsHvF5kxwEm2qG8c3uvwbZHk1FqkqIlo0256fKC/G0

GxDdIOT5b1x4zqdOYVKEYa/yyUdLW30+w9FFLQ3ioFPZfq8tfuoPVdfDLeNLirbCkR36sSrFX4RBoz5p

ZBn25b/knWRDfBhv2WJ5ybmX26oNN0HaG78mRYClV7
U7Jd7WCGTNm9GbOdltla64wrV8Rov0qNWLyxoYHgwnnq93v0zp73fZxY/MMN9er5ug4FL6wHWkqIcsfN

FHT1y6gKizd62B3wehvtw93Bic8SWCeFMe4HJ4HbH3D/Lol2ctc0RgPu7jSwGr3wPfj2rirfhDgnuLFm

PomU4C5NGrzc/XarfXY9RRWYPeVDhu/AdQw10UHxbd
HEfRVGW3gBbRshp6GQNPLIpsje7IhQ01yrMA38qcg8kESSbesL+QnGPps/OFKTPq0acUBSHFwkxzowPn

fhorFdkM7Gd3Ihd1GvNsizsaezuGBuThQ/snOvy2A9FntbQnvM8BdFktCj+KCuUHZs/3loeqpILIm1/t

e05M+FZs52XapXSXXLhlqzABWa8fAzwq0joGMJBKZA
ouo5/m4pQCM7Asfto+YG8hCm8b2zqtRZU1XEH4sQphZUZhKQTUUtlu6JR46Ml6cgF5/VWxGAiKS5sv+a

CGvTZK66CZ24xELb6ZcVA9Qc3lKsEPCWsAVOJviwcYwFzL9lMcZ9gXB3SbpZ3V6j70i+7CLOYphTTafs

/6x/czaCJaszX3z++cl1j75T45HsPjUO47n0lfFRU1
96OBrbUUkw3nvDLnKBJCiTMWkBZ2wbyzbxlf8z4sNrzDA9T7fvWZ7k48h56kCQe15FOMsx+6p5B0Pb3G

j1uNwlSzcTS5phR1YcnIHGtxsHsIKhmiBPBIlHIe/HAklm/1NEOOKMc/f5528O9mH+9mnaY7l2BJ602J

ZQsR1cgKbsVjcjy3tN2z/E1/P3VQfrpom/fRnbfNjT
fwBZoMnwme8wNEzw+VjOTxzBAc/SxDqpK5Easaus3yragk5dQmzvcImjWcbW7PI3O46MjQrdiZqC0iLf

8dHHEcrv2UgvQJ6E/Ibgu0xBpNS9QOaw2BaumyrL0z2sqkKFrLxXYb+Ndt3il5O5vxi7RyNlvd4a4yIo

dkJcvuJm5IIiYCP3/p75KoRa7/7/tY3/b7aR+WDtfD
zkqW+g0tqcSwia2qHhtYqOLQwdXu3nLswFSRP2D/1L+C8+6lnErf4lZu/wNxg8rt5yjqS4DnTuSBaIcW

SeGQZTp8BzR/SdcueGCZxKpE2Tw/zgXxpQ9QWGPj+HMWgk2a6Ki7LW/SP3A2zGeivrfYnPh3i7f5gPjy

XlV0thgZX9FcYa6YRz+qxT2ETHvUZGD0CqrLUCbuVL
P4sAzpkYrKXMY1dt2YhS/oBHUSLA6d1w46g+K87/0VpqXraeKBQEg34n/rDCraPxdsKe3hEX8NkY1rfM

EHiBL9NHSaZfMIC69n8GNMDX2Tya0g4FtXWvE92ZeGCfvRp3PPObOESc8n3qPOWxjbRXc6YkykSm46NY

v+V7PqRVeZpWAU7w+zWThDd4McqbZF7LdzE81OoHm4
lymUu0yprBU8dl0eSFesmJ9d1pmGJRO531YJ9qfI/tlIOwB/PX9/N3LIyiwDq/R47ciX6V4EbuM56is9

OCxHs2X3OVhtLSobo2LPkbftiASAsOeNdVLLdaWKI8KU1TVE5gwa3Y0l8iU16PVDam44HvM/czQ8cEAu

qbCKZ3OtlUvCvg7QxP+mrUALZ0cwPJK+FFOZFG2O9I
F5+Bre2ttniCpC2LUR3/MqZ/WUnWYb6Dx+Ah7P1pUAw2RclHzi114oAhnAMnhhusjweuAU0mU0IZUdk1

61/A7Mjt3PJ4yXrbye/Qd8Jc1828vFtsyl/EmMrPjppf7KJOU6WkrUCb/wddfxi/eenFK8xkQvwzBqmy

Ecev8uv9O4KNIrwEmwqv0b23Xe24xXV968FaqNoNCW
HqRlStUUoKyPh7DXib8uZlbH1XvD/u43g4L+r6tu7nLceijdg/Ye+/gtq1pqrDJUwOzxP3DaGjWVhwJL

YPUYiSIqasvVuPKZjAHISuUgeuWAuheTpFKxQTWxmakTf70Nxzco/mvfjO+TX217q/hqDm3vd2dbuieC

dsaj2CecQ1XaNHEt5Dy5RmmcY4c4m4WRjc1+aDCiY3
5e+pleoNqhEisUknXJE0nLALC0fs0hohDyGx5fy4mU6IDBh4GByh4AEy09QQLsoDotL43WwZHMb6ud4s

qyXRmcZdDgE0t3V1fWvbJH13kyL8KPWU3oxwk2hCoSZebtqm5/cAw0jRFuFaSWI25kdcSaapi0dbQg6d

w7De6M0ky2ClfxD5OC4bqpKz/MqwJeWmdDejDEP/ne
7fPrtDhTWhEI324/27DX0phH4DgwKGLHRc8po5giPgbieb94WCC3d090K2K4Jcu5wntBFYqxfF7CiIak

ISKRzGAUz1jTlLEpI4A9VycAunQRaWsZhwrB9Fk6aqq+bDRZ4/s+D8pth5XhOLosPVCsIDUegvUSpg+Y

tI7hlLAVilWpbR4XAD6AURlqtDKcVaeVc1aFuxfMga
Ej7csG2dTZXkfIp/TkrKwSdP1lZLhLI3wUf+QN1OyUA5dkj978poJtRB88IVtTnqSnauCfgc1dvhh0DC

cZCiG3Ku9DLyK0h5m1SrdJANz0M5dEZTVvcJB3j0vT4z8rT3c1ymb+NI1SE3mGZJT2J4ou20bW6CJHC2

lDIN+uvm5aWrxIUHdleBeJ+evjjSPrALHPEG0a9+eM
gFeUGoloNS7aNk228FY58+X1a32LoFv3R2AQ1kcDXDQ+879FujZEorJypOwAwxGzK7hOzknpJ5aXmMnh

80HlNQyLyjzbui5LWTlwUC1R9/egR9lnfYMi+GiHHUgG2DUyquF1gfuolSG+1JD03Afsv5wy5ThhDHl9

XExZg0XooA/GUVUK7AnD+f64qgV5tgjwVWcUvpQXeb
SS2SYhUJqrrf35+G/lPo8KS+qZz3yfsGcI0obV1N38kXjOUa97ltAsoaHwx3w/QU9a9OyR+X4ZQd4K0r

zYyTdfLii+/tn68Y7SiGKlDbyITjOYA/G4BxKhNA9Wv2E6sQ1/dAoBF6ooneveqLQ+K03OUOkCNo5CVt

CUZYRtLeFKwVtcotMSpEepVercHlt5WJUSlxfQHL3M
2CyEyTgEtoKTV0DuGL/+7lZ1lPqkVSFfgmtQuaYpmH+VQRrK8yOyk9q5yZgonvahbTjF+hF7qRGZOWTp

5Vzx4zF5+/CVwX2hmxioiyXZ7xwyeqgYvtmpN3CZGVBP0u8WcVpG9PF88AnIwxrL2txmSnNIkclL4y+e

x6dWLNp2DbR8pZEw9HO0MAUX25wOJORE/tjZsgUUUv
2Bc7gUvD+JgSGP1M/oAffRjFYmd3R+9JY0pdeoLLbmBfdeO1qY1l5e/4G5jyMV07sPnnIupJlvXsdwAa

J4Ej2C4g+NnlsAquSRCJNGfN6JRI2KWWMvBi09YjYjOkP1vfrWEoxL3KU/rp8kmaPq/NuXd9AgB2VEGI

hjmjZSZItqVXQzEfL/qKexDbjgObmc+WtvBPK1EdnG
1xSn8IsjBxeJtC2J/dhvC5LThTga0EDtv/v9QRm4swEQFYQUI+2vqXE9qrCSOB7gwr8pSMht+246wsL9

UJZtyh+zPB+mvxsffu/rjE5aAO4FgZxI8yzb3OXn3cfg+WgxUyFBdutCJ4YBIyD2uWUbdEJaaYJwlFrv

aGpcXNr73vX+0Ghbu/n0/KDKmR9yTYICZHaOuH4js5
6cwVNAqtxrqKxrT+XoRJfqdvSfj/boYyOWquIrw7nR9oS9tzwtb3SBm4JobUST06/W0O5MzjRBGS3GiA

uahKu0gdyz9yn0b6o9pq+iqJg+NDU47H42B6xTWg5XLQP+WuyzVdqhbTHQBs6qQiesOaO9C7l9JG4nSi

NADGc614QBwUyH4gYmgnTsCDIV82KzClrpmWL3B31r
IJxp0iu1MheTtn0WQhdBdAvcLriBU4tJk0pb6FAU5li0JQ0JUmEhQri5O7T1/nZR1+Muaso81TobQP6v

7u14JtJmHHZHOlwSyD8oDh1cRs5SMbMDBYtd9FooX5O1Hu1wZH0dEvY29TupmYBTRq3alTdzYyCLdalx

x/4M+W25lzOIxlh8rvCsSWsHgbZrRq15fRvAKdQzeI
sS8TrS22xBohZvIhrnsNYcHoRSF9qs1HtKHhV3wndm85HHTgev2BNAM2Q/BBEfFFT0N0bQzUY21jfwev

/03CkcDcXStxLy98Xxf1uQYvYUQIVnoxRthMd/k+jPkNNd3fJkHSbzmfksOTbkoENFtxvm8BYPrWtSx+

u0nNKSx1u2eizqbF/vp6n9KJcC4BAl1fXH+OQwdba8
GCacHdgZ3Uv/3UUDX+4nFKXnDfPXZh0v3fRUz73VRpZGnzPSyMJotJA5xpxC00ltV5oDilvZrBHAW4V5

wWgC61UO4n+j+5B3g3ykeXbxhmjFbu35+FMolwQgHluDMnMmGDdmb/AS2WDBfoPvPOh3tOWp9qy8a5SZ

MYqzdWYGhYN0kXur85UdlzMNxsLlOOF5GJOlFWuW33
f7CFl4LKbzRr01qYERDUkmF0yM6i4Dw5UJJ1FlTJ+3uwSh1eQ/ZCYrIj7WOU2+drKkRe7ZnifM7doZHx

N9J2bVu+EVCTosguO/Zk+Yu9p7A0PP8vASRrHzKP4zuDEVCbPdZqH//b+ioidhrLDcBy0KwzACSE1H0/

49fzYxhYOyiGpMg9CsvPn4L4bBlCuOj/6LZjPSFPsb
daTRec0Ey7R1bum6xexNTtZ3YnBjmhEWTu1plCRkwVBWq6R1HM+EmS7YnAr77usIBPKDHHPw2t+MBYM1

yXwTnrsZCm8NChaiKI3zj8vrxBq68rouzSmVvQYIMo8lU9XRUcHhmBBzMbQYhrDyk7os3oRsbXrk6CJ9

w8hIIrqW8laErGEN02+tHA1tUIqrFEDcAadVtGmcv1
ORXhapeiBhrI2biA8Qkdpk4g52/yV68+nsHbe94ZuFgJ3Hwj15MhzHNi8ETLynR/P6j8t0QxKLnwi0yL

OwJWm0uvDEKHblevycZfO49dPTgYBrD5EMD9uhE9AQUTmpzHIE1+V89WbIof83SfI6LZXz1LQCFjgCe7

6VBACXA9G4Mge2+VQ7zqijp7rYA1hlnNvvgxPFuFIV
tIDXx0f26j7vRqsvJee+bnoe7RpqdCuqP82wHYmblk7Whp5NRSruZdFMCFl/ny864qZ8ZD+YgC6N3io/

ovObduAsciHqy32afmUJ8kLJ5wRcHKjHG8D1RA+ctrRe4icBplcJvEdumez1WxawNW99+KXeYwnlZQyA

7PIr98nubxAhw+i/ZiJZV27S4kBShjdD98QR2TqMHH
/d6I1y0zh/swswWbzvG344HYJ2ZyowNP59qgtZ8KXT/m1VLsMfHoDNvm7YTTbRW/bg6p+tuJm6s65AJr

G6LahyW52oz1wJSHSBzKNLbwZjy4l7v+ySJUF/BBjFCcJib436inF/rC7l4oyhc6aTBo5frw3cQx+k5s

LfB0NOF9UXbDW76YX2SsLYt6cOc8E5BPEgW55pWrN9
IOTnkkZt3n0Vamt66gU8aeZEln3nCO9QaVIHPL3tXyegh2aLwzRZ7KtODDQZWkcwxLu4geJQ2Kidebz2

sI4RV0WV16M14LxqA3H/jmcY/BFGJpRDZMr+Tz9pKDTNkFZEDNXioFjtCQ2sbCvf7xO9MqRBSJwEvmWq

bDy/HOqI60kw7/ikymLOgp+Vpe8nEL5KQkuKy5QcIS
kxIOljyrsusKEtS+h22JiWA1Oz/hN9s2xCniQ067o5AVtneFw6qpPzx6nwTDg0WtSpUPHpSsdO+x5xJa

5hBoVbPjOARJvrECfJ0Bspy/Y456/m/53/I/B5dgbwUzoZdzFSJTZaWgp82moX00o3Iq5p6p+nr3xA/r

tkK2dI3TnLHH9f0vbnkX0EnTfG2TiZJq/ZWNGswT66
c73mtuiUGZ1SKpTD3gmc3MfwiHG/rGoszItGgH60GTs80wNcRGT+q444WzUw0BL3jJ1nUrsMafDrI5Ho

dvHxcZb+jAQKCFRVha6tmz2QFShnRuWMFFIcUBn9x1ptSGhRYIk4S8jA8p09yc/O+3bg8plOmD3DK+Wd

la2K/OxufRUu4k+xx/bwij84Df4qpQ0EZyQnd2GXET
1WtJ/Tui4Do3WPo5H3Fh3k8/sleCdYIq41ltSrjbIqGsealCOjkyhuuPf73YeWH8dXCbNN7E1CKi7dUR

4qAWStu9mcubaq1adJzj0w+DumxQFBvPsd0Hug5gjulMqSNuCLOknfR3pd05o9KO/V5CR/10afSJlGBQ

b+Z49vyn/EX1ViYrbWrHd72ckW6ylHU0fE1R3aLZHr
YblExxr7TZ/SyOgbb0ei28Rs3WBWiFW3aGLvc+EV6ykNRda+/onrib+lK8ZVi/OUUX0dLW8nSwmto8Rj

Mc6Wbqtkz5AbywJVukFNccS0XydOzZCHGIOTS6vj//62iYh1qPonkM8JbrEzy6yle41+/lr47UhUdpPX

B+cfxRdP7NZQ1+iAggrxRo0QGYfVPdklPPtnOYizt0
aGap3WxxO0sQnfpiwNjtocc3zvgzuQFWLbQiW9PoLkCbjf8pxP1DmcTQxyvkYRwxv6+eQh/gmhEH7VkJ

cOi0bkdn6BxdQrx4CtHDhxbMNZTuYUnlkLw4Jv2OKAz7HQeDYgQSzpAfP6j4seIpITfBmGj9q4s8JXVf

GHtLZcEGAqiZ4KokSJnO9xuQBGKmbWlX5bD+b87jsz
VsZJlL1Eltby+7Q2IOwyocL8srY8KwAe54XfWAm6pNRx1k4ffWJAvtpUG0DFbPWbXc0vCxcSP+g0aRDy

0WYdkFb0VPGSnb4i4OXfwhxNDvpQggotZdsswT+++tRMEkHKsiCJhOh7XDslYIoFbauyOMpkddh67mU5

Xx2yp8zrRRq8Y4KWCEa15Y8x713RNZsMT2IwrfYmZM
Wzck1m2mbdSm3UwjBCV/+E9mSVMG91UIp2ModlCeswHztE+pf9J7ioOkBcS96YrqnD7KGxkitTPVm3M/

RjSAaNSpj4bZsVhTK5z5E1RnrLp0zLjp/7eWRhSenw5bl5s5jmxSJb0EpiW4WYcsjvtFanQXNzIx/UIV

O1JbymWSjdy4TwG5hWFrtkaYnmoQ4AWyXmFVCJzbz2
ET9/EehPes7PjnMPB8rt7uCkvlJ4RJxZSI8U3MQN0ifA0VePhaV4lD54El04xFf9HYttdYQX7/kZ1Zqp

D1zAcHhNo00kN8xCygGYzaNXaA9BjHRUVp5u0LxBYX0ydyHRVzcl371N0XwX5uWiLUggGmrFpbYmOzvw

jXKNNd2Bf0xRLor+cU5kfWU2uVOpexz68HYk4u9Bmp
rqi3QbQfBJ+4L8AG4ts0IRpmlBz87y1HuByFMfAv2UL6+AtYQMbx8bPo/7x0llb8BbKG+LKZCeKn/w90

pLaLwFKMPkBIDJgEjEfnAOJLmVeOmb7dJMSMXLVRWrgvm/T/OtwZ9+v5rfYKZypII3cW4pFubnqVy5k4

ojy8J8SfOp5HaVnwW82IrX/S1B0GJIBMqOHWOAShK+
Ygo1lBl1d0bUNvm8+qDvmd+ZKGY2lTPByQFUNw7YSyUXjB4nvXtU/nyvFodRIgiUBQ1AC6SsAXKlxIMo

DyUSiZkRgu+MC3+9jXO2PabQx4JWCwYQR1E2Ya3EF33iDmvMcZwx7exDTKLl6m+UfHx3XJAtri8rP+gQ

3aV2bnO9C1+az/BgjN4GCuzWSNszfDJJrFCkStLKs0
hLfLzgF9Q8lqVwJ47TEe4ghHX+7vVEDCqOXnI2fQ/wmmvSmXlFmxW0ngyF88/AGKMgLdn21VlaPTycDl

mmvLQfzCHSUaZmMRXA7Iq2jFpTlOWXkIK/cplVYREu2Fk/pCBidtOQLT9R02ZMiLPV0j3I5+qlULoaJI

h7sh29IH7VJg25Ll8+jDW5s1ix9g/PPCKfWq6mG+YX
C25sgffTn0D+0DxrHlOw+AVHKlPGZstN1InNmcJoHtGk3GdckFHnv+a2VXJeKb1DqNHd3mqY1mr/5nFx

bgnn9hYB4CyO/gcYESavW+w8Ub+x6WrVpBaecIDXVMAhsow4/Et/8X/HvHE/Ib1e5ug7t+pnRQEk+Tori

VAhqlbiV16byzQDmqhFe5Aj3frM9AZi0D872JuA7H9
60M99qeBeuZJkwN3iTXS/EiSkpf3mlMT6uDHqrq4Adzb1wZZXF7Jm8NpzKTaD4eSFuviUdxA5Cnwvl+h

x/qXtqzSM4D33fafGzJml//C8ILo18c9Ur7H4rn2DqAF0eVFa60A1FHuI7bolRUA4DdUm06UelX3p1Dd

JmbRPnNruwY1ZnXvlrHukJOzVGrlZiuLxRn9C7jw73
aPa/ymxRM0+mkp+BInQ378k9/453Wtj5pDH9YcQWIQg+9Fi6baTNuPd4LphKMsZCURF+FqcRAsSlYndw

4tmKCjH1SHL8hMFoZGHFZFpy05sTN2325hGKwe4tZHi7zz/yxRXTqAWJHExWs3SQ13eM1XX1OIW2NNBO

yGHSf+a1ji2JSAo7SN5aQbIGJbd9Kb9Bqa8sRP/uT3
6ml2s96XR237fGrylAGJyCQDgCdVPWU55nFbQ+INgqtup8obbD+rwjBXC6P/vHXxv966k75ooc3r5kiq

iuzux0mxtLZKfVM5Uu7v2LZI1AhSXKwN2Y2Nh4cLGwjoKXSmOxwx9/QaK4befS/eAZS26EJ1P6HMY81u

Meymf6cYBQMdmX853afdj6HQJcmS5iu6jT41Z0jwXJ
Oq4OP86BmevXnMw+3PKMdeg156gL56+N+6TX5A6h4gOy1hdHWo2ch5N0qPTJisVoispMvqL/cbiY2T3L

K+lBVIF0cjDoNsNOr/tyUuIAMXFWpMBmxNjIT/bEqZTbNVlYYefoiJCVaxgraVNq3m7f8uN/0A5wzpIf

lIp84xd/uOn/93qKEDWLHB4O0aQ5VM7gUJ4X0+bzks
77Zhck/HVvEjA/SD0BuI5pNVrsHL2+qW5bM+bxp9+pxj3Q2mko4sZLZgifJyBG1aMuOymPo13QmklhKz

C2vpJgmz+PXGpYCqvPiLnctvVjGUJRvw5jwmDsIcSuVkBOr0koZWOddySoqyMx/Et1pk72zL8jCWseod

dv+pv0q0JbfDms7X22WthDBn+X75JzmzOrqFESh4Ya
e9LiqNSbV3MTCr04G5pA3Ov8RLCRSC9xLIkt8nA9/AW/LacPrdImhOW7Gj/rqrLWO3yNORC23WtbTmpJ

xrKKcyoH8rEnjaOyPyjiPty0pNzQReKM9hPYyRy75aOJThv7oB8tZiN8QDmQ0VyiV2XRhogxj2UGTBBg

w1fCgVCidy308REkJImhQzjm7jpcatGsp13mTDRwnx
8ggd3v8rPCw5oXWYHVFtprfdXsoJxERrpflp5zFnuYEfyAU/ILH166Ez+tQmHWHaWNe3onV1BVfyOki7

2b87RiEy4C9khZ08RGqhudBTFPUjoHJIMz9fDCJJEbWpgvsXybxDiqQfdlyR2Nx2YIQN/7751lssxhiY

aJMyeA2cl/VspK1xNsIi4wMOkCBIukRmoDscQz+N6K
Wys7tHHWI2h0KkMJeE3rxvVaYOxCPnUFqtFjxu2g6mN4LyDQsm9Z/vXqoU//EjdAdmMNqp5r3RqhUk4Z

iGrdqst8dhk9zpWISa8cFnrp12jUsZ7zZVni1GYxiHxqK0uCk/s0PXbUcoVGANRxKPsY55B7dEsl7eTp

BPk/4RU79nT0wt8askh/j7W8tq6nP1VzL7uuoktG+n
A92JKPHKQ9VmtJzS4zX7Is/vzkxsBJG/48SdS7NsgLsX2D3M5ahawfVkgXnZ8IwkfHZaBP3epcpUASKi

sf6aEpXezecOu7BdvCjfoudC0PSegnZSuKYPgeVfdiVu5gVh5vGRV05cccqPJlRLop9ByHoAIhS1oYmP

/qDVXhKKO8/nGdpmhvi052XfrVpYzY5SKyaZOh/AIC
ULeI68OomAS66K6toewH7ZI8j42BHcBNKhtV8hCyaXpqmMgyy67DnVopfdweuZqhGFMtJJhrc+5rN4hV

vEGX44dCEvpGwuMeHUy+O/jQZCL7uMcUaZzhVHY26zMB2Y2fFJM7oW2yjnnZUJbIIxutzInNBwBLpaoG

IxW22vV42IbctZpTZM4//1FXebtGMUKKbRLNIBrr/E
icEPmxigWDG3T/kIrQ99uAOwhyhBLl8DSqSjoNGtm8+l4FamHgQcMoUlQYlCN3SDhALheXbSrHo4BpsR

9Pk6rYzwdlg47z6tg05o3RIrtF+zCaXdwOqdk/fE+VluL35vuYNqQrwyCwdWt9f5jbpa+Ge2qOp0OPuT

SoEXFauAFjd3sqvs4KN4sJk4g7i2cQawj0ce7uozNJ
CTudJi5nJGLITccGcl/MWh/i1POskZVldXDoI9prm1EszqulDanirIPGxfu1BwtiJjYemG7RU7gzz2YC

PBUajXoBsSuKwwYnnoOo/9r8vxx5TyyVY2U0HzemF3/EyCL6IXiv97CGaUP7jsr1s4z4FGa8wpQmCY0P

ANMvuM1EaaT8KAD+/DHBOWThzyf9bk+3WmiHFGXK3z
wZV9zmfUjch7sJ3eCL514XFQrDWvcvlF9jBUs8BC2bUWURHj2wqLFdN5Sr4wejbZ/DNe/a37PvpYJojZ

ItqM+Lr00Fn8cVJyDbCxkhJwizh+0FC9j9niJ6qQ3SnZAMcawdphdciGn/KrMvNeS7qDAW+2kkB8UlaM

dV4QztDN8AGK0GByBzlr7StAlFcBV2FE8n1b9h/7bt
/tCJbU3mv+W9pWH6UOQhEPc9BpV8pgAgqWjxCWStbblpHk0gMIwaNeChKxfJovjI0jn3Y7CGOp2otBoZ

sc/VMP/R2z66Gch2iw/13ziKdiwKu+x4Ty0kPrpBgDSY2VV5abWm8Mr7Rhx3sZrqkVuvJHOObqSPgcl2

hDWecpxNBfbX9ixs8LNluo9NLHASvv7NT/5dLFCgq+
P004CHyCXCXloe8XC3Ao1Q8Z80AixbW/xCGmfE55sOFdctMTDJfEUYOQsduKsoJQbqGSxFBWIBkRbUC9

nPv1K1zytpqEJppYPplHtouiuT7wMPPXoprQas7mjeSc0nJ5UM++frTjii6L1G5DqYmYCvh0Hts9p8r/

eCKk1ZagEJPBjT/D1A9SiOwtHFfsLBKubBZI/Yenny+4
2w9b4d3L84oNNb71wyv+sEmt5v29HUbfq/4pPRwbY8jC5Ytsxzr04vjgo3oaz8lgWUkI9oqro/T1zg2t

dvbsuenD3xPZ1pO9BgUopunP1ylRwySgwFXx4yhCXxmVH8B8lExHfAsT8ZkOCPgWLx5cogydK37xswcO

d0OeLSdts/aqhFGesj7DkMfeyzIkpcGCtn1k/ikFCi
L2iE33SrsjZAcmyRImDnXL682m63gRACjUTQyD9N6rv4LrcnaW1oQw5aQ/xovRq6PsHa9QqKbYRki38P

1sdpBb+YLTQv08r+PDmzf1mNbm6vMRy0v4UA3+Gabbi/FaXE0mjL2u4KxIwtQ8OYoSqDOJ/V5YvAaGW+JT

irA5QLik1exUbaexJUUnf8IP5kudxqyoceUxpwM5zn
NOjyP4dnuTUJ2weHsUvf65x/T9tx2EypgDfUekTWiPZ08eGC71+Xh/dQBzTS8SQ0ssPGIyU4WzEZgTJB

y6NNL9eN6ryKZulDI1RTM1J7swuAPdNphw2fK/ibCm7bnUhFt7Y1oo3C3AUbHYF5V7NciIlK0P59d3HI

l0rJ/6smWqiu65Kwg/mlgWbA8+5YJl4sQvS6zkPgz0
5CbcF08yfOiyPEbpljAOhTUFndOuj1rC+KFAegTS+JogN8w7xhag4FM57Pm/HSVejg38hrHrw6F1l7nA

oe83XWnIGcG9sBo39O8yk0NRASakj88yaPIRGube7RKz6MDuxnFsYmHczHDqI4H36u//98worcIVL4Zr

8brW9EtJnyhimm/74lSPNxfdp+Z2vAorvcGboZ49bM
q2iD2FnLc/PwIwA8N58Gd6CEtWvHvdX6/Bifkb+UAzgDtV2sZV3cUHmGR8CI2Cx2qhJvLyNlOkkYT+nG

ot8p0CccmrDyP2U20QQc4K5ynvZP+j/Qcir20G5+TDIK11AtguDoSFK9LzgdYd58HLcxFveXnBjLPznz

YKOJG10ojoPf5z1KRk/r15d3/UK4FA0ttAhnNhdoTW
joPYYvoOAwS/x16fopeiiT1GI0aJdx+FZRv+7MS2zJrCAD6G1caD1ndDcnDYqHKGgDApdwID7Zmc3lAe

g2FKBrzk9dP57H9mIPNogyVTBiwXOZB9gmyYKza/qzZjSazBta/LlaHwz0UelSkQ+h+N3+x90SpMgUKQ

toIs+Joaquin/wal8gXCQULxwL7wBawbcMkmOVHBPhPTc7
+GacBvHg+69aECZWTQPBFd4s38cmkM2lIc/NwDKNtsnAWC1iX+CPYrugsLvUDkT0RZOCocTLIjQ7K5ks

nI6ewivbib8RVy+O7KGKZbM/RHsTqtiqO3JU9UNECYWNM8sRILU4BRcDrYc3t8V9ZuZpFSwQbl5/pfEp

Afgx9B6S6nByQR7K3d9dWyAuZ4c8T3iVIfMqxq7fmc
ryAom8Z4MZ32mwzDvG9oWoS6SNICrCgTqX5q9yy1g/LbWbudjqsRl09aNrpz3gbM6xngE+eOo370rt37

cA16P9dZTtnoKuJ3kQIJiD/E/1POF/xv+t8TiXm4gm+0MHOSzcIHk99jCzjNPcNrMqcoYD0MHn+LOzGr

mToY4weYkjJ5010D1TD0+iGU1Vol6s3Rqud9odHCjh
QQM/0GNkrkPatGay06KKLDGEeyZLcv4mqK+9pc1LMdfvHRKjOVtMdXSfYGYQ83/2eo+EyRkBJH6+vex6

d7h3HRFmU6rBQm4oH4FjPZ5Aq9KTzOdrLSzd1UsoYKITwEqTeadpUOm6ymK4vh3sJ8oBwSi1ZYwOI1JD

Vinotwce6vVguygnSpFdPp+WTpsMSmY5U8eeQldk2+
LnGg6Q7ccyCY1Np7eFRls6LmYL0zzjEFxflplnHhIqb+Nj2LGK9yhrS0z+KEkbaZhPbonnhOKqs3t0Rc

ztMmFl6DJ4ONOArEDVacbEv36vqfpul5Rj7r2eRqVVk2wihJdeeCzD8uhRPhXAIW9Ox1Oy6ecvjmdoK/

AIVE4hXyXQhXT01N1sAoh2iF9ZCjqcANDoF053JWnR
HopriSNrmQl1AWCqZ0D6zv8qpSc/u88X/44iJmsDoZoyCQdwFxKmBluAUssdc4gCezGPhL/GSJ2J2cPH

kRmW+NzVUILbl0pymrzzP3bZ45qGIMn0L78Pi+QUhGhFE1lMNDbbgyEJr/J4CfYr0xDewIbZ1/AFtgCo

UD8LDIOPar2S7IPZigY/eZbhlW2ZeSHqjRw8knu06y
DmMuKlu7EvfDN538B63DQq1Y1cXS68q+fGzn2ZA17dR5TIWau33cpmiSCNp+RvlpD2CmjYmk50QGafk6

KROolhCqOb66cOqdI1k5Q2KhITw4xfLasHShQqITTuama7/z6rfQ5AwT//xFPS9ODmSKxZhhoULP9Igd

5daTLuhtpjZXPymXR8HP79FgtFS382xmC1MAF4/ACc
/qi9eByzTH8sfmPwl1M/NBT7lmvYB6G5WKNslyfhIXFL18HXokPO0+ToQBU6/4LcCp+g0Nf6tMioyQ+q

w7sG4Thbg+TGD3kCgN23sgBWZwEz5oE+rh3lvvtmhQdBjrwAwYkVn/3tRdhiUgfzqQQvdRINuFf6LNUq

IYCm6Njc76HzcH5vaeCRwOFAE4sbmXlFP73bB48nbg
dOsKu/3yBbuGZSojpsVXfjoLbEomQ3AoiW1fYbggVZKwKYFaIRox8WPIgmH/sXZTkxQB9nmMGigwlJKG

1mfTc8sCrQlYGuD6BiTmFp1FUZEj7HA/O8lK8RtpQvcOomRibvMMxhHsSGe/LpZ1Mi7oE/F61NNwl56g

anQ3RSG7jcrId5jOodj6gyK7l+y3MH8DxBGxcGQXrh
U73nN/wpm7N7p3DNX+nYyLUvNvSXecw1o5BVon+pf6kS+iQFt0tBN4Xy6cI1jMnNoV9E/CQ8sPyGhhww

4SOsvH9fuN5Jny9CdeedmxPV0E/fRC0aH3K1Zg2v/ieFX3sHY9D3jsNAeiJL7AXRSMrmyyGiBYbka6+U

+rMyycGhLtcdi5hmb2xoOdJ6FA+ricNkgxbXfGwq51
sDQ9YLpauqDiY58HJL7MREhhQ7nYnxZ+QPcKsAo3mvc9oyfMRmLgbU6bhh9iWDRs76kl1E0LoT/t6nV/

hQgiYO8TYyBXpyw5li8EiSW6pR/zIcLtKL5ceSMpcVXyprL+VwPSaTqGtZC8SeKolj/5WGeDeLL3WOKB

h0sdOgLTIBiqnFEa+hoNk4Bmhh5aw1sfXjQNAwM/nL
0YNbn2TXBs9DMQtAIocs3h6yg+iVQsMdIo8N+Oi9c6tAz0Ue0WLxEl50MFcw1yaw0ICyGLlSiAlGoh5F

71xTvbScnM7DDMUQMK8Ek99mUWwyyDd6DoKTdin/O74flf/jb9Fa1adFuAo8qhnAbd8R7kWhBjMvHYHg

7nBQEO2KQ98Mr6eIfTbi1UIIb04g4FRtF4PpfQK5Ss
KJlK1cj0F+sq+PxH/X6CbEKQMDmJ/9qJ8IHObrFsOnp9rKE9Kv2td3m/YXM5aTwP+KZspQ9qBTWvz6gi

B1Uan+exTL0U0nIJogIkp6csrTUs65qwP5KzFeFa20hPODS2soc/Kfv/v+IAlJ/3Bjpoa+fQ7Sji8h85

VwAhqHL7GjuU1mRpzhsIj0h+iTa9GnYcFCnu8vF0Yn
aOMJAvDN/u4FEwOtsw/eBH/itgIBSkFr3rONefAdOva3nv3KcJgXwGwqEd5jCXI8Dpy3MCkld760jmBv

f0oJApi40tNchy0rdRm+Xuw+PO7AmnWZj7Sj7pFD/c5BxIjodu7SMqIBg9PoSVU1h/DYD6TJlm8lEM30

tuUUBmHlQPSdroBGl1UFaWsDDsZHZw/iSqwF5VeL5V
Oxqj72E2PID+bFanZRKaLCNHcTE7opkQafwh8hur5qLdTYCnmFnmJxMlcd239Gjx+UxlXTv2yaBt9E/g

u1pnH4/yfNbMVcax9TdLHUXIlSOQznNiAyZkMvlBsylCLwacR7FqY7ZEjAjpD3i7Ohcead/FpDFgfA3V

lzMF2DDkSUg59I0Bwv8yGqLs1iMQjliwIx51L2MZ2c
mt3mlqmrkqjASNSbW3DkBKirknUPUJJqCxhe2OqHnA0+Pl8Mpc/oqsO0bhdSDd2IEG/l+ajxiZomYA7P

oq6/6ah+q5AoONRZP2CPGKvJeP59uz/A/jqX7A2dsXM07ovzgB2AIxUQm2313Xhuz62On08XTdlnuX7P

U5xIwsifgKm63lzkOHZ5cdewj+C5i/UrgQ+DqhVFke
oPNUVb0B1ckrfuuudSQFB2+hvDxzbACi8UGP+0NLQ/gBO2VPSgE0549T1tRjTuZ6RexxiEpIX+WBE/4W

nMpXN7eARamtXJ5vnlFm6oLnb4+fyxpfTGF9SIQK68bTwGXl+Richard/6oe8agt+YZyBe2nE/nSbJ2yfI5b

DpjE1nEH9VQQiYoVtFpMu/FVYIwpzCfUsm5N+65izX
h9KJwfIBnJSgHqq+38RXCdnX8fm//Q6t0vGy2kzl5d21hrTRQUACiqZpQ65wboe9OqNeNLc50sWReAzb

nhPFKKyPgWUJXlfVMfhjlpa6/XbpPAKqKcjeiTGExQbCkikUUs0vsm9zV6TzebUKFDoW3kX3nXl26EP/

snA2+SuLS2hOL5KjUn0410/tzwxkhNlPARauDDUHIg
0Futvn0em7hb1f8m3U4f0rVqw9ra0n0+wHIy9LGeZFPXJAgPJwqkaw47Ip+FVazSYjLi2zUkQhMWYNY6

LymqKwKxjSd3oprBpSfjsLfO99znHM+oUUqBOjPIT7L6S8/b7auQVKavQHmzrvjUMi6CkdxtF35dWJTU

75s4HgmREsZtLrfftklsEtHNLj1/pdIFBR/2BsbeAg
B4in3hpKBfnUjTz9VZ0fVggZbZxh3xP6ikvjP5lX4NN6b5P8LMOElxwxu30eFE3ClX6TxKHAhb1WlGYO

Cvi+NWPEx7xgyGCgnzsW1rt5Im4E7U3XwG9FMtvT7PxTvMs1pjmnkk8cY/e6EOOyzRT1bsF0m9WCBwPm

6NKe9KwVX3xH8M+uxxf26IpkzQycCTK5vUkfqgtDht
6eco8k2XssBbAIe5O2PeN4EF1JyZoYTDEr9phhtQfuWsToH9ILuiFo03mNG7r0VJze4K6eQwSyVIM/8R

B38AkLmVQcy14fCR7nZdlWgTIZR0hkHp+rLA4zoW5XjYI3cZkQR9zMtQp8SaRcXdziAAKv9Cs5lmQyBt

3gMv5hMNvvlKRtabVzAviUwhwz0ktq0wttg0zY84Cm
hOizGhNkSIYChPQdAHG6NHX64msi5adaZd/a9ZYLphM82b76Nzf45x0UasdIGDF92esFk7KcG/UJF/pm

a2UGEcy4n+nlQqZjE06aHI/auQnYiQToj1CBZScoT9VyXZ6J1QHslfRUo2hsvdh5om/HAfyjEuO/iEku

ZSpky40aZgDJuyQAoB4ifrhKndSmnzhlv6Hq2n0Unm
EuNWI+nD6nJILA8nG8C+aLMscLC9ZMrMLLYQkJd/Xm52Ly85e/+vEJGw5slHoHVc+4eQuVv89xwLAM60

d1tMdi2F9bxhtvZ2tpjx6sVVjhtbN3r0X9JCzbDwqXijfgINvPHOzYAbIohVWvY4g+spTLrpdBwaJCRS

+S+rHqW4agZ/8ZWGjgWC783Vdz5SJRL8Y4o8kfSY8y
CXKdT5mZUpz7b+k5qVenEvGhFmEa6VR+7KkCe/SZnyUy28u12PCcmm0gGJWiYj50hW/RouY+wrwUl/bg

HMqf6idu4fMY2Lbfwbakh77UZnq4YpLaawIsqESASAhBQTndJU1S64AmykAv4Viv1NsuPg0Qx81zukKi

QOw+Q/Cq7bL0wk9eIxVrQWDzRBi4aC/JH44vuhGt9f
i0q7s5pwuGEC1VBRXQ4BGRol1uwrm61nalvLPyNDweFsxuh+Qhxujo80wcq/m2JtJ+cI7wh39EaTVLR7

Dvn9YxDTQEPbwbF860/dJFzV78ClUc0OhYC6zZ3jyBfsjZ8/PXFVHgNPkic5OQUb3KekyI7n1rCaaqwa

AjbVsG8ECADvtYOisUFjsHiJVOekD9ZPr8R1E5LtJC
ood3UdIgSgC6MdjRA7JsqW+P4ciP0qYx6dLNJ5NpMHSL5j0S+8y1ZnddHtbhs+9hC4p3uzaXkYJJNBqt

E/UNfG3kJcUiIbdaBOCfoglRk7dnd3DJiHJsrC7gzeyp4spfSqXsZ+gyJZZ2TX5Vp5rOgaxgoYPCLk8M

gWQPoPPfeZC+M+PJcVkU/4InBYQpCzzkptALRUMD50
VAbXnoqrxcsRpMNNvXl70/Esb+nbOrXatciQdBzxTVsjQXlYiO31bXOL0xufpWr9c8yCoaHnBidVgyqh

D8zbq89h0dmCX+Udd+wzG1J/gZNP+VOZhckI2udhDMXMqQ/jE//PuoyNOEIPcq7onImXWYEHE2s5Fi9b

BhZkw8DwcidfFmhj1UVdyqLkZ5o953pitD4f5MHdxh
eRuC4NrOT6qPdi7wZam1/rP8ClA14QWvwAKL79h2LeU9MrnqHwd7UkE+4jJdUX6rGj3+lj6M+nhZCuzR

Z8HY4FCqDxehnfyI59ifhl71jkh+gcVvyeEpsqZmrwYPiXsvPbSmKRTDQVsHlWugMr2hq6kEYR7ktnNs

UPsEIpi+gOpry+DuKZujgVXS/OCljTmkgFCSAvwBoH
QXpSZxWQTnwClIUV0ya/Lr6grRKuBcHew2wrF7LoLCMFZyKQX63nXcMBPtNsDCvMBem0hpDU1fzKHQf5

f5usxc6196rGj7073eO/jwq+s956t4BXBpEVcZFyRL3a1pQkEqsWszeTeGdzKuJy13+edM5rerDA4CaE

dVoTJOiyCSGutpujx5hIoz57v/sCzGzw8tOm8XHvDA
sFIMmA735nnX8px1JlN/KEupS7MJ8h9So+/8pu10h0Dwu840WPd5MCaOtZS7h3Bn5HPNVviVMcCaYllA

FhU45NOHiwBq5mEF7R7OtEGWaE6ikUzMpve5mlwdqyiY8dnhYmnNqd2Jv6L6AOWxsNGUqQU21SrPmygY

8uOLwldZdy+H1zhvrZNXS8UTxgV92x0kVu08fQK9xy
uirDV+zg63BvCtJW/fTDainLly/6ko4LUak4+TxiWv7CI810aeMy6X+YfcCcCxb+CK7OOdlx3kquodnC

AehDwp82dx1TOgZQq8tTRbnuM98i6ja44ChgTDM/Isq9I6lDymMAqMez/ijBduB5hL/YExqCdfknuIcB

njpOJlkMOk6J2PhLD4y/gihGwTqMdhY8FkRKf/i0ey
3itCR4cQgfJ2+Eo9IqgrgZN2I43gfWMi+YC81umpcM5AOKBd3z5x/IdNH67ay2pPDX/Grisel/FrwULBqH0

DuHEezhxGPwrHQM7VJKgZhtdrI9jsEmw2fryQWoqR6jjW4n0KBWL9H5ZF0lJlP+ncsqB/Gwvq689WQNi

njx+q0vEq+g1M7pfL7OD6oPxN9b/NlEL2HLH/mNEpe
jJX+3vMRmZra1bSf+eyYdyqzvjmTrXg/WjMfcVnZpc9X8Lzth2gTdowZN73daChq1jhXkjJ2T5aW12tN

kSNcDqxM7uxXHcl/Tt39Se8vv/gRwnaqsVkpVae5c0MAU4SYPzlP5rpP4qQRS9kLD1spu0ep14wX6OcA

GT7aP4nYMsuo8Fn1o1kHH9A67gq4i6TRpooAjeVrWd
gt/O8hDPACN6CEl2bRJ+hn653E3ABJvzTz5maguZy3pMJk88dbfcJV0NuCPE/0RxxblQz7g6YafKepsq

Uq+chq5RB954LuOfbA061XVofl1AfWzIEFP9eGC4gRRnzKO8PlTSDuwrpqkgo2V4exW2avW6uftArFcI

SqUEE9f5NTHNZ6XcwBg77FbhaCF2vvOw4NiPZL8zZ+
SQVWrm7lvyaLbzLZEKb5nd4us0bF+Ppxsgz9G9O4dsiAPs0oJ7/pyu5Mmo40UxAabyS6sJxywcKPToJy

lOMVlVroHeAX7Vw7Zm0/mTQWRmnouOelpAEhbWUJekS58rkMR07CZyNN4x6KAcTka96Z8LtGO6gWKH7r

OZFInTbm657xoV6nFMpK4AjTBRm5hxkAkU8v3Ljh40
AcoCogTPl7gKo8pspj3aKM7y6YZDHTwe4kDbS5JsyqNiCwHrQ1N45EZZPhEVzVXqV8XeZfaR+JL6T8oB

qQgaRJ+riktLzrIoeWVAfcCztIn7m80MC/rryL8KyBRGnxvgcRvX2678M6ag/rXgSSaRV2W/6+3q+A5D

wAJeQFn1TlMs3Zhik2gZIMNh+PJm6lHoJoHE9CG/RA
YiKZJKo9E+QJNT6icTqbkOrGbJWZNeEYuVoDR0O9oGvmUXVE3xBKTckeeIW28DpLZYFcTJf7mzF+8Du4

a7KO0svmc0a284JXW1o7YuyCgaH+ApRjcrTjboZopkpxKdKHTRCXOm0VYAateEyxEs17+YE1AHLpl321

fmqlZI9zkITyQGvxyDTGuFw8ul2f//Wy8on4UMm4FD
U1/qUI9h2K6Vho6xJ7AM83YuWIT2iZ/ZriDu73ZKf9PrE49QGu7ftLwsy8/vcUcSEAS+L5hYPNlo/m77

JrpS3rhPkadEtS5Tpe79VN1d3HOQbiYK/YSipojVzj0ItgGul9R7xOdL4GaZXhD8yvHn5c87LKZ/XFI3

nHhG5O+bBybBZ6p4aaOTScr9kJwa0+8IyrVRzF2Zor
VWvXNwldhWu4bmjtLPvQ8/9LFeH/F3tTLYRr/enwdNVeL9JLh3Ud+xM0Eb7qoqobLS/Giy1a+cqvFSjS

A274C54K/OWttc9R/FyKV6573jgxXHshNLFDmrx/rxxUpsoT6nebRtN++Qfr7BQNCOjCZDQ0qIEqfvQK

8Cs83PAure37wD85sJU12YS0m1qcN4dxR9b5Ij/VJ2
0YLlo/Ux+DZLb9+isJ0z8jhtaDMfLl8MiNKQ5J8SX7D32QTRullq3upKdrFH6zkx9NsM6EcDv8+6K/i6

4Hk0hdoslbTKY30i5aj8po5uwIfqeLKKsvrAErDYB9HdfUMo1Nw8baZV7M3/m9HEUU4jS7t52wXdmEw+

hvYQ/lrSxotFhrvzglfvLQTe2eazBIgERQxqNn31Fr
Fa+iAlGdPLjj321wC71vLnQHjIJ8tbIT2ykE0oIQub9W+lD0KSGmPKk/IxWkxraWzcaz1rZ3BqUIiZ0l

UePrCCUp0YAMx+noroD085dHTZrdm+1xnTcQoP4w2kAAKxJuw9dQ/wFiBZA25v/S2PEp0lRgPWxysrAu

fr0ZSfHe3RteGQr44l9YWqHG1qXNXjQGZ1hhhb9B5q
SHmhEx5M9dvO4Yykf3lbpdPNublqVStgaZjSBVOpx2Pcc7E2OpAxubdcNixmE5y1vdex1EherGeKDRGN

A9oWAzH1CdvyJP3wIS8N6Sjb1peduMmLR7AdYf32aWcEMROK5SYDU0sJCCMojHApZX0uugF2Vco1to4V

H/oX7o6QE/CjSiAZuSGHhgmncfPeu1FFPvBE22Zb4/
3EMi8aCrsZAZZzUzRJ7Iv4rt9A1pZYQQSxX6rXcAnENUnVEJLZ3Vqt5bX1+oKXu4qrrZjXcrodZ71Ouq

eyF+8MUPx9qNkk3Zyf2BrOIsw64Tqg0aCvU8qJn3L9XheLc9xB21V3wzW0deroadA/vv2zjMmztLM0eC

QLHk9ZzaSktHVfL5D+wXaqwIF4W73joCBKN+xiFDyv
TYspZZDgIltlEnGJkQD4XO9MF10QxYFcrM7F0hyWRng3HDUtnBPM3cpPy/bTLyFWNL4Rl+QLfEcNv/QD

b2Vl5DH3Or87iJQ+DRhuKOmssk4PrQFBmN2visZstbDkiLEwxvtnMHGH0iF+gyLEObvDs3TjBX2DpHdn

ZKlDQTCe5Aw/nJyOEDBybbfJBVYLmJyco/rUKlvGJ6
/T7WDQp9I1xNT8kNB1xdRCZSp077yMjX+BPgV3aHlPBikfb2JKe+z9GPmtI3xZEVy+WTNgHtP13TDj93

oTdcS5ruD8ozr7KC+rdwRLXpZNM3A/SMQreqyFhyY3U54YearQOnPmTlI0g3gUtbsU/PcWLqb5uRcJeP

Fsgk0sOLkxTy8qNTmKzYwYUckqkBTZJTc0kRDomP0E
3kSudRsZX2t0XC26bUkHl9ZWjzeDSWJnNXzQAlj+xiX+13F0jJKFsMRO5wwkpQdmdoQi/x93bGYR16xW

3ggSRzR4cLM+VtZsGhq7z+gG758uZ7Gj1slumMuy6T1V41LvMvPcQXXuyA9gKzvOuKFVaOTJmZnAoyL8

eKEdG04vsYcASrU7+5jqHyeHdTVT7nzHolh/1LHs/u
mk9Kr4H9kJecJz3TkDx7CzBK21CWrKUe5skqo+cDHn1F567yhyfWJ6VmChhX7MfbBKdjWNrEuYDuXH56

RztnOjUfdMCx8aZMx03yuPx/4UIPwie1K4TugBX3qJwPR/BauiGB7SSOvn/33gxX/JyafS00EPn0yhQj

cwyt3RxPtutC+rg+SftCxkuwpxXts00QkXNFyBNiKp
C6qrBAzKThRtQgfX3gSdnLivUGHJwZaqwwol/hmEkRqlyB71ejZ2002I+uevwodst6ohXLGjPS9jH+DT

slQo8yH+r8A9/Rmc58mRiouATEzwKwpb0+Fl1hELouKC8sM3ZcDCNNntgs1Z2dHXY4m+07xAzLOsI7jL

/2g6P5H5wFVpxg5jKbDS3gi3BtGGdH6klEE/cPkE+8
asa3o6tExLpj/Z/YKxupnBc52vQTk7az1IhJehfAQbCzPV9fv1lVqna+XyTwTt5Ear5gG5KAKSjMMuPX

t1u2YDooUfYYiPoxfBjg7Ko2VwPfbRGnoxtkf/rMAu2ip4XCIi696KJ/3rshz/4WiVT9N/ovaWRoBgoH

K08KFZm80278sUXWQtXV+DgDDPNsnWLFZtw9qz/umB
OjFJjkf/xURwXS+buj+d7sihjLhPy5cmMu1S+186t8K6sahD0I6AlL8ntseMkm4d+2RGYSOdVZnizLeB

0aS6/2st/Dfz78k/vKAjYY7t4Jfba+TflWKPFGGpMP6P3bywasNn/4HFzs/iHxLxE2jssnIeHKcFgUC7

4TxDt/Azd3cxYkVkEOIm7QorRpFxic6JxNqC/pBXxX
XwKa4/VqoeO6gGva+mVODWsdDVHxSnYduCQGAVsbtqHBq56tMxbzlwBosv2hpHqOlbdI1WVtMEaJ9wfo

enjmNCdquoaYAFnPa2gna/6jl4oie9xw3+DnZpGq9M+aY5gGl0NhHsAR/I0mjXqXGyxgtV8ftxVS781T

Wxsp4FJzqhRoyU+uPo3h3tCibkA9gCIWxNxK0nXdd6
lP197UZvxNsrvC2NKq+X35khTDSPEGE+qllwqt3f7jnHi08a0a9LwLsDI6sbMQsAPyCJb0INbnBOcMQi

8ZG19e/zNxiV+0lZkF7JaoKqPIdUPofYij937DirGpnLVjkk/02ic9QYe7O2tp7TUejZpIlasvEBhnh6

bm1fOyHOKApcbtRSZiwkBr6hm2msCQDxSX54SIY1wu
+29eaO9BuKF47KEAi85w2sO/Gf/2t2CLGadgcX3US+Tf/gUcCi88M4vAvLyGhaE5xiXeDstl/LslxVnB

lFGDKoFcyeVGqDvmtwk94Ll5R2VbYwcozDHU05ZEBfTwHwdlY4oStN0Vle3mCmbV57yxPKzuk2XODsEc

JJS4OfO+SWU+8pYeNKIO94il7aHNZj8igrepZaCdvt
Wa8dKZo1ErO1lD7/xBi+4LvUKIX7Qw0ryUw94FHCPqS9C+YFYvjcYhgFSCP5nPYgFhNRPiUxQvexvrjg

L9Bz2CKHa91oVpRUVx7h2bYVJRjht9sQYYZ/Cm3a4uv3AeNbyY6XKa0hUfCk92682XXIx8AlO+e+lS5k

tvTLbr/azyk0z5+hl+fk6jOhZ2X8ZlE9dt3nxR9Vjf
QY+9FI0gea3FWwQc/GgywM/bsJlU5xx9W+7TS/D2t9+Jaf7/x8b6/15IxjxHeTCw7Gd7IM0//QfKNGZm

HavVnJt36D68VLx25R5tNr8VIj9SeOP9jfvw9KDGTRNlCRVXaSrDv/4pmlvUmRQTW1i2nqSLBvrq2oP8

fYj7IF6Z/0aGDO2vXCCayoMxjmWKSZU/WkFedjbU0p
B6DifTmdgk0MBCAHEbNfiNRdaG2n2oI01ySIRaix/LJTuI4nxByDCSVvOy39Jr9Y5VJdUFgNXZqfZwCI

O0j0Ns+l/w5wwwuMEzBwE/CDHyP6IBOE72Qijpc0nygJ/IBjkdA70EovO3CQR4g3aODgpIsMWfvCp2kB

KZ/CQck3byO7e2iO8KXfEqxW6E71oHu9lpWtlJ9vak
G2xq2SXabWH2m+zB6TBJgCd14VibULrpQIPkSx4WmgZH86mam8kcKfV8UwPQaoVarThnzYKvdsFBVqFY

kYVgOJLhKX93tjHb1zRoPANsMV9OoyGnNwniJGtWi/i++Nc9kzdbdRJ+j+JWVcapaq8UtvV/SC/q9K4B

X0zK7QYAco3+xTNlDw7i1W5LoMtKgyw9PNNnscPD/M
h2cqwKn6Z+xdgJUdfQAyhxMMonQMWReevC8GGLQcPZV74AfAvj+h3bM15+LucK5x7zkf2j85eyKhzDAy

Gzk2f8SS+F+JPf62GeviPl2oj7v2i1pItviDld8QeiPEq1Wi4HRnBBTSwqJxJrSrh8X57bOIZ5sPLse9

bkyqfWTUpnu9LQoC6TAINpWfC0DwD1NlVWCsW+Dvs7
pPPxDcbJ5CQvhjjWyIq8r1QHRmUWBJwQ+AGuTfMq5vAfkyoikY4j3aNpHR+ubXZI2LzL+FPQfDlqkrVC

g4B1TTBQYN9dfkjbxI/LgZE7I5hj3QPtWhCxjSwyA/Be68qVEAH5HCx/B0drBIw65/FVI1TZisACubTD

WFlh6UZAwvnu1BFtMXWXXcRiJUrVNFRVkDR6E6lSqD
GGXFJch15s5zf1ifAtl9yk44R9Gwt/Ji43nr7yf5ny8s931l3qyhGMV5f4+PKZhuft3W7gWv80Zab2IC

A0ze1ncmFbyJNe/z2oZ4YzXTPTte7myJ4YPX89Z8yedr74FrroyncbBdr7uJWLTHXy+r8vSoJInSfwzE

GEAVBVf0inuiga3xKI6zl6jHZXLTzAo9x7cTeXKUKk
PRmkG0p4C50J30kkzwxwhX2RAj0ogG/6irFDjWtjtntp0/ovbEpToHGIWIO9GzRpxsGNjh/G5uNTC/tw

RTcCX7YC7B6ZBg9a+7HsNS1LUG38mZWm+Yb1Ojg3L8PxwFNUTsA5140Zn3AwDv24vmEgWL4yvnSBMSAY

8jzi5ns+ukQH/izUpzoHmpQtNg3RMgof2ZsNnFDooO
BlNnya8Amcp4biVJWfCm3L9ZjMqfggTvkJ51vWAMVtB9pALrQ4h1FSI1ceVdNeh9pgw8a7YTIvOVGoh3

tM68Fibwx8aKmAILuFk7N8/7VbwbFNSsespohR93owkcsvqCaBwTZg+B98vTaLXTuhiueRrkdxkH1zow

ZIvMOfYM2g0QVsPBenreODjG+YmwgdU5ffidazZMGK
WWBB6gk+w/jYWpyadLsUBrme8z3+pPvUBlmFR5D0/an/Gz1lEDt+BPKV0tfN6jFTHhJtLYPChe6qsZt6

lR/JiEtczaphDMycsHcNr4ZPhHjSUatuTtqVln/7vhaOGqB0joe4cNz/tXNA3CqMlJAq37ZxHrnEuNSc

CYQUPdJ/NbIPsuBg/s7GDzQcaD0n+yhYAD82xT1A+A
0ZebEMllGvXHDGaW4k5VgL4eRzoPUWfhQMMuKL+eIn8L5OK6bqwMSQVdExbBtLobvY6oRtr9Dylqb2M9

PiaJCRrbvNOBqwhVHYiBGktH/bg7CzzbcTrhsIbT+TbwLgdjFWvqjcMxpQclYw5EK69Z2vteHBRdUJsd

B7gJ7DrKiL+P0dCUwswYi1/F2gKASN6uafyc7loipT
EQE1piUQGCeKmAr+swu/aLQmzSgll2OcB3UYzMVYLUUk5zfSoH1WcEmTjf6vK/V9j+pxm89VtN/3We8L

iy1h76Knw5u0REpIAE9weLkMB+sfzPJlygkPo34gpoILUpe9/WMiDO+KzJFyEFpcIoSi2xdciJpJ/1ks

FbSLykslXIg5NwpZKyinA3MnzjUQzxmtnNnltFrF+r
OAmIzBrxEDcFNVdRQDuS43AawrLwcXPUIr6NPSq+pkUK6AJjWiZW3j3Az9C3sVSRedi9wki4/YqUPeDR

Pi6syVwqEuMiw4Yhz2Fn2duNn2jl3UQh2wsXxoCDIeZ0x5UUp5W9mchoFgWFRlHEf7y9GUfL6tmvEnnI

iR+WmwsojSOccZDFMdx1HKv/9AT6pVxTU2zK28wYN5
ilRdpO175WJytD2crZ1HsJSKLJzdUKk/bfJX4F8u3O/4zaP/VgQiPfVsiEd3METD1jWRTgQQd8OsWG8a

eau0Wx0OdjX7+1pQaPBDGWCIXeACYoFZwbH2O8hlzoWRKtmZgtHFrHuHrufGNNFDIXyEtVFyeRXyHri7

bcF4ejfSgj80oiwWVB3W0gQWdK4UyOcDzrmmXVNAWG
oro3T/rpIJa6aIup3+Z17GY8A1BtEAZhmA1vqFYXvxf4sx/ZKKTOe7yor+VkcfJps+wMfJ5qmDlFHQaY

7Egf+6nclRI7UG6Tu0uxCTUw+vBXoZzk+fewkceUHGbRKFc2yizlrm1weQDXt+MuT4n1FJW4I5al4ZgN

luBpbU9LfFfC4QILacr8LvoihJ77tV0JLD1zDEW8NY
Xr7AHzdOO9yFE9555mhs7vPjlr7udoJYKROUKvcHJdhKcEe88HOd+Ucjze6gZK3jbul4yqzZBospFeQv

TJjT21B/Fv6ax06yZqUiPULmMQ156cJSYC2mLBaVJEIX19CqJogLs2355/hZBb1TUd0JMdvV9g1weNVr

UjSv3oiGiREPqps+aq+UIBt7UsBtaJ5/LmKgTGCbAQ
vys/0RfmaEJ0IM3sF7kx0EcdtTDRKdUlgjxKlaNzRXwrcKvwXG6eZqDxhsSWOJvLl8UggRu7Y4JI5NtF

gfyLaJw7Ok6oGQp1OSNXKB3VY0AqiAD3e0+KpbW8pTMYws+eB2dAeCe0Fu/fLOzMCrDVCcOdMshqfCTt

fzCfOy9FTS/DlI2TrIaqqZAKrRyuaSOkpP1Dca1CvV
gaYbhXw0q/rUYWd73zt87uvz4yP+3Qpm7V9C95f8jpRZ929Z6ldofIeh+ul5n+tVrI/QtOryHHIES6Ib

MHArYhcTyqUzWYsy4Jq7bH1OGIuh7r55pnbz2SNKayp4e9DNNEdA3Y7fEHvrXMa/5ZFGsH5RgAdY/eRl

1z7rR6A1WDnDfcr2sLVio7zMXCq6Q2RuLRFn3ff6nb
UDfmAhSk3Ie9fVomeiMwQDU0cxkifZResku05jRMlIyzXPmRFOE1bg17+TenArus5lF4ms0dLuE/rw67

E7ju9aj/ieyOKCuhZjSSSPxA8qEiIgKQEBYRTfORWDeF9ZkVs8T+nQYy7KOg2vN7/F8X0g9RCst0Z++D

4vrLchUtIjnywxXTjALSrhzwDU0Jc7cmWkj1nX373U
dSN4FeCN2MHouML48qjZeAI++rIP3PHQO6fN3uQmuTJE6pw30lCqQ1MqxHeEyZLOE0WpApoeLVQ9JQN4

a3JDGQuVYD43ku6sgY836xb3LRFrG6rBjMsWSXcHRsxUxWHaqorhf4Mn+6dWXdnzrbKKQ5FWaH8yxovJ

Su4peih4Q7jYuZaEUO/+X6ZJ/Vv+CylXoAQrKKKMYE
owVabna63tLvNI4ljwFPuQOSGZ0ghbMutRNRCk9p2+Ygv2LB+I8ZbmVAWfSjqJtSGCU4fEOHEdyEeNx1

ijMPt/+CLql2WJf7SsEevBpphdLGaX6gCsa+RMEsJsQCD36pxg6UGk0+6bZU51kNOkmYjSi1KKamzHSW

+/qnIE8tVzQbtyAaj7FnlLEP352T0dFIleqH/NBJWs
kRM7GI/OlACsLraa3O0//lp1rWXE76BaHd8tt//sTeWzgn3H1kRwUGAzlvymuRzE1zx6o/V7fiDvzhJv

AaUoKj0hXSAlOrrf64qDp1mgOo2eGhA0JBGXcsofelYByX17Vm+q0IeI/NW8oYM088DpEhP0cydlJ11D

K7/z7SO7DiV95GO3F8g8yX6EEAcUx73OBL/cAqqzAr
wgam5nq5nnHIsdqRRXEuZDE8KvnV+8S+sxpqoXw55cioF6nZ3fB2fvEYrJI/opI0vF3Kfimiwgo4j0i7

TFUBNoCUTwJnS29Zze2uXOErcez7LV2DyB9woB5N19Nuzyc0/oQVjCao1oqB5sA8TqN8ApYvGfbTJ5wI

Zcyd0StKfyWHbvb6P9njCSweEYkbN/37b4W/6W/0+i
T+okrD9WTqPJjMrvj5Vt2sq8drkVHqC4N/MbUgCaqfO4jfABp1szbmJ86n1ZT3RUZ0+Esther+pr8kwimUI

2OsM9AQxLwk5V4VZkzM74H9+wltmmFSVYS4jOPhHtqrANlla/a5g3ig3+DvYbAUWbTPIzlIObEuaHfg9

HNVDKFL/4+egJmZAWAxnIXJRPga1Yz6R/96e0HGVkk
7ph2gFvLxIZA4LT0r1/6mc76b2rvYgbpFRoQwgh6QvfIsWjKWLxIhL/Pjr3Yf/IqfLfwfh/VU7UPQleU

TJdLApxJOuOF+L1915IsQs4nL6YqAcofy8wygszIStKBqC0gff+Q85StsXZgJDcPp+Y+ncIQHfMmak9V

bnVlDQAuAu5p+sZNWXDLgDD9xYBuMsQjCaFcYzmt6S
c/KjfxiRVjLAvuRkCaIA4H6STcXCfQ4b4nvK06pgDT3FGi4eO/9g/FQn8et3GOHRZmfImIrJ7zvhQdsi

6gPW6j1rcBkOF3cnPexq5uQrFFFUkGTf8jXxTHvr9SRC6fMGF0cVR8Dd069oGBph8XS39HNZd1XXLZBH

z/DZ89xVierwS5egOvro+QiSb9Ib47ScUT8PMNVQ47
cgH1a1HkcFNAGCHygbVO6n+/A8dcjwKq+XIMfa7wnADYbdANOMQLFYQmra78nzy8Ne+dsXnxZ10/v8y/

mY8mCbZ4u6iog3iZyRSsV+B6KwwOlrhkCTQwckB7BW7z0EXwrxkAYGKwoNT3zECTkChnzgZAgjsvhjG+

n9ZfaKpMugUc3vw2FX62Kiw5w9Lhehx+IszZFOifWc
h13QLd4Ad+/NmnzzKjmXVeWJxQ6eZXvqyAHrlNjXxgEqVjnB83esuH87pRbKSkMh+yiafeXK3gfeMYka

+P3tgMtqGeL+y9m/pbkqWR36XP5oFR4N+izsQC72gjK7I1Hi81aeTHysTwKQcf6LVPB+oRMrt+FAXAcH

O2oxsPY5vNUskHd6sni6VR43Tjj26KMMm7Y7HPBNmA
C/x7OTDzgU89uP9uGnuWGWE9kHa3Q2aOTR7ly3ndfaL8evsLVwmXhzNFToqhULLd2N8lk8iF9Yga1TfG

Ms7brqDpK95n7fii1mK9p2P6pbnaX9Jw+bSn2cgalKkd7Ru9MszPvJzpYciQV7nPWywChnoQp8cTLoXF

1e0lk/DclK4qBouT8DKeBoyCVOoIbFrT0xJDAURR3p
q70GXRQGUPMk0AFhDmfRn78hsCyExLxJ4s48Bdf3444K3TgMprcNUldk+Qfgbkh/jJrNfaJlXJXp0rVV

l+JWWOqpj0upuyH/5HgNFqsEt5VQn7CmIf3Jmenjy/EkigvlNpN/K1wO2USVUbQsOBlxdskXcK4dQYqq

tNx8eRUnG20xnuRu3DUy48ShXVEN3KJloWhSA6uttq
PYBRthbHAd9W9bKy7mML46OJq5dLMGYDQA/LrHY+TzDDBUQugPZYAdbfmB2omsRMck22b1YVqdhrWNf8

MJCAvQbq3sIdaBjqvOPkc27roJ4QZtdNXbQOz6bl9xbH9rsEsWbOz3T63GKoM1Ne/Eq4WHwN3o4bYEwL

C1hBfIs23gO62UAQ6h5VixnDIyiOt1DnsvE1mZZhQh
PzmBg9+cFB7GMQsEuJb3KljI56iV/wCzobDHKo7ynHoRuMXatf1MSmWhZfPoX9Jnw2jr8vxliQf8G07u

9K09WTn2RoVwoQ4KO+k/R2R+ez8+hF4dKTEPlbcIa8Eo2+QT9DfDvG1IyaLjuDmpYoaUFJGGPKSAW8lM

9vgDv4WJJnKRNORdNCmRW50StKejE2m4DHl9szx+iA
X5ypD8BTXndhs0czPgJ0LWUwE3TcTucjet0fAUmlor6z1uzGp1BT9ntecQi9VWtiEqNNB5gkAjt4yEDJ

3ie/g34VnZu1Bnl/g8frIL+Su4YXp3/PSEbOCd0rdUw1BJaV6WWLreJyUNj2bmu5+LBs4nfwfmavkae4

Z9l2AldwNyi4g5xhMdhdncGIbCGHJgfOVUOGY/nXCX
qrbwr0wUObt6AjwrIu/7UfpgCFKao3DUG8CHLGIv14UY6N8k6Uw14levlSMj6N++Juxj4PLVuIKPu3rx

eguAp/rgwC64xqaWJvWrX9830q3JuQz79SRr1lok3d8EySJIutiF13HT4FrkTNdXVq07z1qSrkTU/xZQ

u9wQ4+ZQ2VCM/yG5j4+uscx9E1c4wcTbsHT/nsVXkX
mnAC/4/lblMxxg6Cv0Veec7FPzSSFkFL2Vmd2xOa8XV4/R0HdTVm5Ykz6RT3RPgbA0IXoMzeh1+AVCpv

2atUAzOEUbsb8sWGMxp1a13opfCapM/7pdP03I5GLSsWnRGq8GJyWpehOE1QfEQQb9mOlmk+GW2EWuXQ

vQtGGwM8n9e2YsvjF7G5+gKFEQRU6e72W6s6wmF5wW
j6lb6CA5JcbiNbVHYkotgA+h7Pv19TyNAyPZ44CUdNpyTX0yWpTQLO4plk+ehxudttOrbJMuB4Rw94aY

tKVMd8ANuNtc1GMy89zfSnFaea8SlCGMrCIhzKqRpHq/hEek7Rdour1vk2uWkRB5+8pRRPjjPnc3rGHA

O5qZLeE+YS6yFscY+v3ew2SYq7m9/Czr4DBrXDIVYu
Wpb2+KDuSr4Ar/3wds9yhCDoJp30AXOVX0B51pH1wHzwe7IwRUvCFFVzndvEVNH8baQ+45f2H/yLGYey

lG1Q6UbXwXxwkGFO7abwrWOJRSKwyaOYyb2HIGG67YvIA0IqoAc5jpHV511VnRi8hY2wykk02jGyofli

18NUQBJYljx6uk5B4xRfCMGa3PJRlDng9Rb3ffiYRo
cbQc2chGqRJheW7GGZbFfaA22G8UXgYMbh+j8Pz8Ewu4g1JdU65Hkz1aBkjEJe3BXsgEclMkoiHA+U96

4wq+A6EKCQG7VZMCwL0Oazj9RNIRbH6zWw4a859F3ZEcVQi7n/+a7WHbtt5HnupIXgRksYp04zOCQ/HV

OFdWbhigqIwZWMI47HMCPBSZGlJwpmCu0W/CN4w5b6
pmbYc60XUhh183vPeow+G4eeMF3+0kyqLcsvnxOTg4YjRIUePxwMhjVEGnbO6fUIX4Bti3zriHR+b5Wd

TtRZ2yJFZFfs7kKJ1Bxui4BHlnXa6wDwFHKkypmVcs2Q+kMmmYSNYhC7zE5gXTLtnbnvJgDJJRm0l7Rg

B5Nkur/JaxO3ixIepen8yK9l7kQ79ErGGBCfDIyjtY
8iGN7oT+/W4HUeM/5wViFQYtdT6S13OFpLgYw/q4yR50dQkSKwYXIWg9EIvCgP1+efXzsMgvdMoGBJH2

/xoxzvF6p3O2NfTs6hkcZ5amHjIqARX3k4DM8dw8UsmmaDy7b/xUudFaiM8q6i8AidsxOvgAy2KKBITo

xzoqrVG0jFmFWUe3PEViHOixvqq9ApawgIvBDsCUZw
ab6/pxKnoXohXFkEF9TzvK4aAfFRxjZvtYhRNj7bQUMX4XAPDISH4mGT+Qdt6DOgAln6ZtSm9kJe41P4

FW0tW4EE+QZ+Lbi0o3Yctm+nudPUiFDpfbk6e38q14BgJvGyLQlww+iFMuoLYyIbCr1PbDCotwxFFj8H

ecvICUxC0zX5SkcJu4uXJopkJLI1M58DdCC6Iz4bzh
lP974GK5ndWID+B48tOxOJ/KB4hnlkqHu9/KOy8ZoUUdqVqVGvP48zwXx7Ux+60iYYr4zzWq1qSUWbtf

0/Vav/XjbwnEdOg26vpzlwkUjWaC3rKzmcmWCDszDnsndWf4AxUbdqD9dFZy/x5DjyeimA7eh8dcwmCR

FUxOQCVk4byS+i9x7/Pz6j97hKXb5tbcCakLNy4W5e
VT+/MGpuIPYtlPzMR/IQJ1Fg4/h6U2N4k83aeIlRhmmr6ELVJNeZ1C+zPEccWqIoQPciW9PtMTrzO6W9

o+mpI0ATtsB/M50mvjogyWmSZmK4UbjCdetliEs0XjS8PaGLQ2UNwPfL+5tFhatm+5gwhPBt/BFFXUzW

/I8htQRtU8wOr2fRs/m/yJ+swbkNABlIbAakQij+VJ
pX+8AcNHTcRqAAHorLjXdl4CS8OC3c2QPTnPWgJeZdsV74bLx+qb/NuKUz+MOQxxylWh6SCT1EvFf1ux

XGq+5xf1mtEt3cl9EpM+TP15jvRR6LKYBcd6F2qL3gCTDQui/4fWt3Ks2f2Rb4FFKg/lXMPeTo6uNOzM

BSJiLyJQsQeBkcLBN1s0Qetme9kcgmUBmEdZ+D3l0m
YUvm9pdZGFlZAJ+GcHzKXR5b1cV16hD/bWWqSqHEGndlpzFea4dqDAvgzlKyIe2ZIeuhpVdQV2gP8huD

xKK0au85xU/bmulgl7+OUx8lqROSgLcu28rCyCWHZ58vSqFKovJMKfv+BIlKBaoHk64tWpB9mcu47T32

hmaQp01KRn7oO+2lKkNU4xu6yDdtC7ppxsLlgd7+xl
cpcYextlpetj1NwJh/8VJ6ujR/6DMqGr8+Elliott/QZt6UZRzQYq8WGQxfiJONT62oKJvYY0DGyfTEisytS

wesjFa5rKIgkZ98FIRQGOxj2nCbB8z2R9cURhEh0FEerY5vlddLXwEmqV0YqgYK4DJKsvJ64+5dAbHvq

sM0q2RLWT9QmxFqvvwtc1SqImT/k6VakkKn/NOrVqc
WBAUNRuIlNZ313a0L9QrlqZ9zAHO6B3OQcdrcL7dJH5ydWdgcVRty8v7857vAEXgKgE77DWOY37Zqshd

4HWfdami7w06dHBsPsLFCBFJTK30BBu6QmSiCwghAMS8uljAQt4KZXcfbuvoHMCxDt8UQv5P+f16oQhW

n/Tqv9aeOcOH9wDwbYIfbGiI9hbfy4E1VV8efvIL4W
W8yJZa0uyRhjBdp3R3sek2/kXeyd1r6gBFG7hCgqvlKCGcvbpVKWAMYfCLTrns01T46p0OwewzOPsfat

Bq9KtmyN1xSwG8d+YomYsGejEJyYsvqa6JRgeN/i0Z0Iq+OxhjOPNOS9YvhP9DghXt/x47qZ/CkHP4RA

/UORzKLhIvnwZ6MvX1u7zsegmJq7KE6zelHBZlDXLu
ZN7I3M+za21tq+yeIIrAjaoZe+K8VjvGd+4rLYWRDWu9oepbMUjuE5RNupOTqxnV9A8VLRW4ct57Laek

h9HsvwSxg5byq76JN8pu4p9wgwq+mb68B0jSm9I+TjTVWGWDtpXmGsvZkxbmP64uHWgZl9f3hD4lDDBw

Q5Ee/XDjFMnUcZcCdEzEnTjShe+W7r1Xdnc7t+Kp+3
a7+D9S5FxlrDwLD+y2z+iOsg/BMSZD+PXhsy3qSyaQuyadOWTq72OiXuH/FTSufTcXs7feYT2CTtqTKU

Ux7nmjhJAT7RPeFmySzXempqujs98Wrddbwslc6rPT6zUrY0kX2int3Sdo0Yq7w5iN0Ui7KSscAK4hDQ

+yXdzBjcTpzt8Tr4vmWrifpc5uJLey8T3gLHN4NvNt
0Vdjrgm/GMYxKFXh5a7psm1Ruxq7AOVWrlHAlt0NSLU2UKhWTiZpCIJc4sVp+4JrsFrGurIDs+8iBF+Z

Z2YCENNCOG/UMUIvceF9QkF0PEUyC0Qd+Wi26eOEsD+zb0CTWxbMEj+EI3mRbTkfLmRX6ogc+qU9RnvA

RQ2Ch7huRgbrzBEqO3pxuhbZ+MBanfZ6p+e0bLBvFQ
dzKA9J3x1btIpS+VMnB3wrnEYDn5smAaEtdButDo0I+Mx6XTtdxUqjMljIKPvxdxiUxtxQ+SaGcRPpW0

5sC27AvR27I+s4QLPJqx5f1qd9OocvuJsF/uZ7cyvaetSp1phcG27buLX32G188pr1F4qGpELPP+tYa3

j7xs55QeU49RfGtmvJGSNHK4m9poA2kq3L97hCfI65
OmeN41eKMixxOb1wKf5S1E7D5fwXv1AUWAltg2JkPLgwtVGRZoS5QtQJ9mc99973hq6uRTu5VyZbQz12

oZv0zHvN/o1sn6eeQEtd86fgjPFxUwTmeLD3WaDFw1faSKsu3Cwpv0FiH526+XrFn5iIw5S5GQf8inW+

UCfgchfy95Cod4zqUvOrOwRl5NAo7ups/pKZIS4Ewj
3GvDnE+UtjWXAiWj3Li/HogQXQgJwKOkuiT3+wPLno7+dnlQoaEsI00vTT7GUiOodUyNYW6BLdAJUa6V

zi7Qeyic5akUTdqBkkiFV+cdDMZlz4QUO5EpOSp5cQ9fvJbntQ4GUMdi/+kGVbQY5GCpkAUVGPcTeNLj

boRNA0zjxZUOGTX8Ydr4vsxETTIaMBOt5TUB7aC2V6
QZ858DAWsL0OMjaDYmQ5G1Jfay7FU0W0jpu9Klqg6FoyyrrPjzqNHarkxktHMICM1ev7KA/Pgbh/nWfr

PrBOFgIW0siticWz8u+pRkW08dy+rIRXC2uEJ/sOcKEw/f1clPVbE2cVXgyS8KKocUhL2GabBfvntzbl

abWHSykD81gMUUK0YfaWub4QJevQFwwUnqY1YrUmfa
KswfXosRFV820yzUpxuMSYTA2/e25fZw+LCNfktdI/bYxQXdykeU1D+XK76d+wNKJivUqLjIGNHrqLVo

u64QaSUZ65YNDyRJGuB6nj5WlINt6F6SBTDe7HarsoXMzPFmQsqCP0YICjxs4F3XsPp7RK+1puaVlG6p

1fakwZAkGqYVc1ANZyWYH+PJr+4FdC5YrG9Ge16GFR
9AWQYDcuNWFvvCycp68qxSA4PeyechtlqeqzjHMkEa4737N1s7A+siou3UStuCQit2rtuHUX/mDnIesT

bSr4Gu9nI7k3NeA1BJGrXS3lZTjF1Kp9X+AhWFtGf9ZEYHCFmR9Pn3vT+8r15st16uCWZ2nwjsidOkVE

Wf4KE9poTg803gqrRZIIas9ortSj2iXseeHnACenLI
+q0huOM0FcRiZY88RgU0PBTUQloEOK0BqooI4ql58KnZVZmCV8tr0pv2Gi37kThNwswPTeyWLZYolcib

o9OH3323+O22uDKIuOE/vryhneLmz7Fk2ApBF7sHtx5AGS77ft6y2Zu1osrXT3MKz8AxNMpsJSwTtJB0

kNVOuhp9gCHNXQq73unICchrwWAlI5q/wPrGhwUGmb
QZSta4Pxzh5K7+M/N+vYk8z/Io9CPzWwviQTR/RTxGmVZ54wu1qDYL2kazSjRWxl5YW3fajBC+5hqusS

XRxu1CNjEycWfv//U4wzEDjr0I2dTph2ySR60gxk3xz4IpnDy2cQ7eSYlePYqVKYOpuNGMEo1pvLuHbd

BNfh7jPPmdNCWtYxChO0sU1y3KQmIZICKD1jFmbxXk
pAhwUG2heNwE9VNPoVny6Q4CmK1C50H2b7ENJb9ve57cIZXyaTnleyuIF94f1AYiB5HHAUYUAKKG4G5w

9rONOoyronGiOdkO8meqilC+vnsNv2fE6kisZ97LLjfol4GHfxHctaTjhH055Dt+/gLdyipsh8xljxOo

KNlB/jRFK+GG1IbZhJHo7/+fx5Txe9dsKaNOjdFoeV
BCKBtvkNh88xuoISsU62xZFNJWaL4z+iOPennS24DAmpuFCOutpM0HzeWWOkAbkz/AbIZ1FcE8VLwbEO

58NfXBwC7Y4urynmVQjYMuCrNfntJdBPxvi0PxY2TZU4DS0QNHfUdYgEfgmptfMebVWbbBijGZt2cF39

k8jp4JyUyHhFA887kXCymlJFSmM5n0LkeheTKJY6yi
2ORX+aKjPDs1KmSJyCc8OET8o/EQe3kWEd1TVNNeLP6desMn9rSlDAg5p5S19SKcnqt18EpURas7fPpO

0l656sS8y8FlbjdAozCYE4ucYGBrqeLoFHOkomD3dovABL2rzewOjgp3a/RU/ZAQHPhjjYOFqFAJn2al

TcA/8KGOGUoqGPl+6k4EmtsZSIOhkGciHDnnmFQlYi
534f344sbmO3Uy6a9iRkV9bwzQWRVm0++79SQDnfDRTaBSZAMsrQAFfeOukxou9A3dmXwkyZHI27UBct

EBwb9fntRC2cNO2eBA63gBCe+SgtekjB55J0CrkTGmCxMLlt8zbs17dykKCmt4GNo1ihrNfD76nJ0PZ7

wAK6Mpe+tUl+6Zw3vr08bSfU3fOWy18KmSYjm7Nsct
H/rNyCLcPBAn2RzNu4nXOzmnsV+5OJZGkLw6+4fuDm/A9D0b/Wh632TuLJ5dyRvEGNKNshCX/HR5745N

K8zGPPIQLjL7qu/PYyfZz9OiyodGBw35afLIVj20eyhbvWIDk32gcqqIM9BIKwtAr741aKnr0Dk2yGSv

xp+Myka0Dc6yzU7jRxma+bo1t7RH/Ot+ddUsJuCtea
hGyhJ/Ln13Im1XdPX/iqGeYwmdg7l+pl1MPfhnAFSsP2jONnOH936bCJi8WUd5V2AT+EaXJMusFE/nLQ

C/vUZMElGZ5/jm3hLnUaCXouB+ohI3AwU5E9siVvsxbFMhn6MOwhCpHf+mdAM20m4cFBw6RO67d/ivjz

HCSRv7OQxIUzI9G19rOepKEK/XtOJj/GGy2X8wli9T
HtJSRiBC8iqDZTS5ltm2aBHr0+5nXxDfSk73Jz971qnM5jxdHrjR3IYzKVWpolz1QBp5N68f0sd2V5h6

a6EGsoEoqBHTU3AjgQ6bUJbLsrFFNOazHiDYPx/ejoT0iD5dgo6ONXjzcRutz065pLZxdhlW+VYNYmoz

Y/vBPrFAuplVa4aC+NFk6Xh9k+YRJRjfuX8DlKbbuG
NB+3iJp5xQ3ku7kDgm5faZGqZsuoBxGkBRGftyhwqLaWLKKndrXuG5m8hoQcaxF10/yfrtWBU5Y7tfWD

PQ3H/798ocjUG4Y+euvEdz379JMWY9no0OuSreoNQ0IcywGNRWM2Mi0NIvfptgV3rt8eHst9RzHjJYWa

gdOQzqgD2aEcF5axYyczvFZGW+43/Kie5cVMj5K+Ku
LvknVKeY/XFaoKclfqynqv0y4veg1yajF7ByioCcidYZpIeYUrz3/YYoVq4AesGjuIj8StsZovxDWY7u

Bc95Wr/9XRvYi8Bni8G66tB1icTSl5Lq/HzelF2msc/4GW5BzN6njcAtP3gMsxNGVha5IZ4v32WlZpIl

QCWLFVCp4zzTu8/exy3Uadxs7zpn2mKhM8s5/wiJTh
bJyU+aaEQhPbp+4kdmfU7mSPjg/9I4WWRLLQV5NVY1RTu/p5Wt9eAADl2OHbZRlveYEjUEJ+AKUDQy8P

TRxp4qzGZCAfINbUiQKlW9onkFNS9pIpUy47Mns3RMAxK1nCUtF0ZcC9ZJJ3gpqbNGGTQsEFPJsWC7DK

tLh3mXYpbzn6T18tq7HcDQSAZiSsmxXTr7HnT3WSjk
2ymVGQR2ZzBkC41vLZTl5gWNdlmipkGlB+YtZWe5i8SkuZmeB5VEGXwQvEWkbeQyYYrIzNI2z2uSlkEE

Fzfe7qskCpDbbtSIjVcshOCg6rFrcO4J0pBCY+Sr3XCJ0YDWbPwWSkWMhFcl/Yv88gJ8jluYi7UMGj9I

FIwasL5Q7BH1O6O6+3XJnxIhJ51HqdqgngwTdQhtR1
gPGnD0x0V7f7Q+JG035MYM0Qy0uZF9xggLPzSWxBTVvXJ8PDVCBm76cePIkMC+YISUWqdj+nzIbBHvFI

YR/mdyPBoddBwZp6LvM+MTp7uQ++v0nHw+IG9jsDTOMxGoHXWZfiQuB6jHTXGyJA+ggRkCJrce75UX/V

m1AHfSSifx23F9tM8X/bcrzfJrpOjRE80UIsY6Y95Q
Xhj2/bs77yni7PYK37ShfKXXpTctzBPoXV1pfX/e5AFZ3Mm6wM73vOC6h4rZkJKP4AyIKJ8eBbhPqxP5

pfwLlmy84P5ECKaEOXx7J+FoCZAyJm62Ea1+IYag9RKtubRNYorcEXZF7WTRCTNbPAH3MOVaHO8q+QJi

IGSInawl2wAqTWmlk1TvMix4MO5naCIbPc6OQsSkS9
UOhEOYP51cxoANjDxBOap/Ug1Uhjul6BM8o6IjUkQy5W91JeiSN3yerBOcLZzVwEgyekEExArxEZicoC

1eQqicSPXylGGJ6ayWm4ZCp4kyc7HeNUpr+tDH3mGwrp5uSVdoNzz8cPaSWIGlGIpFpuGXA6W8DoH9bq

sGCH+a7oiFX1A+JR8WLEJmFnXYYsp+rZdPfsjj45r2
fCrcFwjoNWyo+IhckHUgCKY6j+W6piFNgY5iNg8hXBZ7yakWQWLM6laqxnf06lhubzLOUG/+TL/aVD3/

3uS9nhvMgyohUKxdoKUVuEsyYWV/nMGFbSb20kRUe2dKnxr1rlo/1uyy/LNhn475eaLM9pXqXxzJOb4q

YeBS4YakUjr1krD+ICjATJPwi1w1WHRIeJLz8xOweX
kHHvxwUNMtrrwaNdpl5OuKPseRSSO2Mtm3/ve2JkUQAHA458rgsldrfdivc9OlhkjFkkCiZudp1R5dki

qfhMyikdJy60bSOjfXKVebBYoCy2yTX4INiLdFyPdxM8lC9tVxp7y4ONnBQXCH73edEc+/5FtcWvMf/I

qcjJv+A2b3MSCoEjMahMzckSJKToq2JEKO/E2Sr91U
Gg+MbZqGHidSIBvnOX05Ww93q4FR/l/0VHfb81ShgQsEZdJC1UhYIRKs2eTYkJx2ur0Av2Lr0p0l15fV

PWXHKy3uDjcx0sCDgbelNxkt/U4VMqo3o7VPZ0GJ/GpN8IITeA2mMw9UGS4HGADzml3/XfcUrraTiCvX

jG7KRkVv7M+Q/Zlh/bIr5715zGnSQqRZB/a7REXtZ0
jYV8w5+lCj36jjMZv7ch4ab+fRD6KEqPmT5ZpNLeGNY3p5/3J+ZS04VadxitrakHv2kDqcA4HqYvDMo5

KweNJYqVIml+3ezvb3Q9p2ui55FqcmHr1lu149ZGU3KyUvokeDU186R+mO54eC45vWdwslW+HGJ6K+TX

GT8Ln3r0izs1KJQcbER4VLWm3gQ69SRZ9xdzNJ6ELo
DbPrn++H0fP/jY8x/C1/y/+lbelAtvM8tjgNRjtbNZ2ADRKGPWZH2c83OZE00ct7kpPMBke0ZDQsih3N

T4CUhhDWtoFLqF7ppOG3oDoXNroJ7eBhZ0khtEbwMnxEGgBW6z8bAYMqweSex+sRTo5hsGBOMhBDw9bE

4s0h4v2EId2Jl9nXNZaG1QObsi0nFJuZSPfbBguIR6
kDjwS/AAkuVXMUc2lTpaWnQAL9cBsl7hzMoBQhA/A1keNoWWpwcvw5a70NfQgpDhfRROuChaztuoHi+K

6AcoGZk+7RtR5lfjh8awNP705xH+MfBwWqEoc59bGbKp0OwiMebguYgK2n0QTFe1Iqh44FcC4OBOtk1g

KXVla9yX0IAY75j4ffX/MbZ377pPPyl+Ibm7zjZkuy
vSKtp0uHHQwTcs7K6XRn7wyma8DQcmGxL4H/Fndy4fSHLISvD/JS2OTFXBKAlKriSq1tSGdaWV/mMfLd

WLlcLaGg8NGkRixz+jOp5FL/K9LCHXhqqMJObYZQsvBC/VLVp8bT65DRHbK2el4NOFwYUNNHV3NXwmn+

vEKjgEwyUGUulUGfZkWUkXlzIiKUvF7TTyOGBAIzEE
maZcdwW54MDHcAmfBQQM1hKL7P05ihuNaJYQ6rdhTQ29B8Z05ktpMVG3dnE7qoYT0mlhoSEMfuk7kher

AJIbKW3+yFa/aCsV7ftdP/yGm9qgMu9159UNJi26mvCySn2JEpWEUYP7fcyzsXdXeBXhFoGYR0c1Wkjd

5Kdv0pknHbo3L0L6s1p4YtfJGLo0TJb/hh74Nor838
d9k64WbNsxz3aiaHZLkMAx1nlfbsVKh3TARd80lap/HOn8FRmca8d+Y0GH3fyoF7HZIGU5J1DxXT62Ww

z2yTb4fWaRZ+I8+N4MpulRwptrYfdNLynPGVsu9B+ElJQJjJ3wR0FMLl7NkpZ3RqCpfsRhTyY0Kdr1rR

AX3Ul+9oTCD3xzpF7lro2mhsdadXhVgSo2zvpfmy/7
zv0P4452HPA9xZD9VQwpc8jL5wghYitIwiMbzmRLL0QVTQQk9UukpV43dAKEw0nQvvVuVPKiqGo+s8mJ

FfKgsneYkZ4wZomXt9XXmQAnx0n2DxVBBsneZJStOD8/T++QVGF0ojPDFG9ixcUPJJlAIgrxl+uPqOv5

1jp1nUqmk0c+23eQUxdrM0lns68WB5xHs0J28ZV80a
ve/9/F104+NSjzEWK6/U45XtBS1Wdj4wrPgpuDeKEmGlQj/3jV6HY643O8Zr4WdH9xXlxE3He2JKGISf

L7JbriZMEkPJ5yHtlpPhQ3sktJQZfjEDDuFcCz3Rz3zyPiN3n/awGC16CODdFD4cZfxx4sbeyU6q2UMv

JT3waMbRGS9AmsCPzTDVyJ3mM35VtxftMBypuqSJBb
fnurh9leo7fdTbhAt4Ygor0uoGDjEeq5obU7X91c2ZlULHactkcqnRSmB+/9b7u0YVYMICGBLgPYY08E

Jbs23A2d5+pY/LAG17yTsZ/OOS3N91IJ/hqOFM9HJdB96lBDgqmL9viv/B5DuAugVzQEkmKJG0G2GyfL

B2vW5xsOWgd9LxsXWDxMYAf7xWahG3UrG3hxHdbg9H
xKjR5/iw/TgE80xDtsUVC9EVvusk+uXnfEPLfg4syFvCsZm5ZkH91LagK+ATBNsNOPfCl9552MLRDPtl

9S7RUbMVATVDEwGaYUZB2yC14anLbf6lV6fD5f4I2dwvLRVE3duRtGQ+7EIyOb2RVwN4cGiZytgO/C1L

S966efxgPYc/rKVANE4mRqtssBNp0AsnoaX21TdJbQ
ypT3R7VpR2/M0sYMCLQ0+vQF/15G3QDvK28XteTefqgjYkqv3CSLz98ai7GBDcwnc89WleMyTzR6vS80

sm3YYyEzAD64icZDwAm7YOy5bmlsWu95XcPQlbuDnbko8VKZDliwWVec9OhGbSFT4TCh57DxpHkCabmI

COC2S+TTCd9dPar01Rc46bQAkF23SNnWLezER4SiaZ
o1FArQLcqLqlJ3diHUfUpCKFrwBe2nG43rl08pKyDd7JyaGsvUL6CN2SUjZYWNA6egv9EQ68vCr+vQVg

VpgAOtQlTDUZqjgQBy9yDTOiFatH9TaIJlwbJ10/UTiCE4NALOKoNaZUApdNx3bhBOujieFi7zDZL2cb

w3yNr0C7iiYudsvMOxbAqqQg4ycvfNfC6PlCiD0n9F
1SuN1SVZb32nnt5xOeNPcDl9NvjpC+MXwHY1y8IJxBpJYYB6fwCwg30n/IHjkQ9m9oyl4ciqGv3n+7Ec

xWsOm4ljLhsPJrZWgaKT1nDqV8U9b3SzvBOZb2Gf8wBVa24qwjfVqSqA2ChKJXiFITsyzcsWRirZ4vJN

tu6p7bo3Qz0RAtpbaxwL0ilD5KNnYrcNDkZtCiXTqv
5+gypE9zQuKUDyEjohfcaimaUm30t275eGWzRzypruMr1s1nMnl1wq+Er16Q31o813f5hUrDmbsBKkLX

M6hqj0kUmW7G/ejPBandc5w52j3ta62L7wDEaKAh9pR7aWt8a/VwsuORvgbxhP085/q/YxasHmKLZAmL

9qYdVfkW/Aurt9Hf/6T7yAQ7kbt1mkMC1ngHmlnDtO
9Mhp/6OC/GVp4cwLc/lpzgO6Y1Y4x+/NP2Gw2MOGhNf/DNo/7lpHVSIYMSsi5y5bsqF+pDuM9v3BN6MI

/BmdH4Cgu5QR16f/fojkeDrOel8/W7bihJni0st9KTZlBDYufEdPq2wFfrSgORudH4VxrJJNKFD5Bwpg

TfAkRmPQUjTsB+Je77BFnOZyIv5xxnGJSqX32RApoN
2eNFHA45fZV78oJ2sEuZi07hqhsZ6/XuxhUgdnNcNYY42aqgwUNUn1bRNDYwBvIWPXhTFqp2y2XLtVaE

Z1U0vR6HA/0EDJHWnQUL5YVcZWvuFRV+PWayU3TtfWhjfPFlkoHEWyTJKfTyUf+dNcO7doak7zEryxN/

c836qwz5MUdpuiH0jm+PjnXHOdx7YuD5TQDyUq8x4V
rWMR2G2IeO4fcMEd91Cvl2XChuuwUXUTBCJyA72B0ojn0zC1dyv/WoCXxXjwbPld95k4X4pe9cAeu62C

cAdGBSwWFnowPw0r/7BuOF4ahVQfyUBPJrMF9bJL74MNt5gbShtLgNCi2tXP3ikILVVHghot2+qbdto+

+8Lf/bfgDU636H72OrAWL/15XPRjZOp/d4IenR/v2k
RKIZHD8Az2Gun4fWVa89Q0ybtdrgTklXxf++Aw9A3jbLVczJax3KJBzK4lxgErs0wiF3cgj4p0ReExHy

3V9JIfvCY2+oKkNYBDDQrvO9HBAGSozq9G+Cnt0Eb99Oiak0q0VUwyb4m9sQf4px/HIZwQWxdCZMla7n

z0qBgOqhFXCR3QUmkRIPe+u+VwtKm0F8JUepcStRy1
5q45uwLoV6AEmHOqpZviHCpZf0fz0Na4L8qxbpvTM9yRYPGoMeo79gTNOge3o2SrFl1WxIJ+nksKcNOV

Ui4HH+H1AqWzU24VV3jPRYOEl5mMqYvPbxXpPO8g+Q+Gxo7aP2gJX/mkakVwlQs5qiQBxkV3pTtEiHE3

4Tdl+8mjZUv0OwYfKtSf3QeodDldJgbnVoNml1/tig
eu5IxWLM1zOmJEbqBNe/n/IWVQga6IWJqjvI+VFq9gLW/mJ1q/kAk7KdhusaEC0wdqKkwCdOBYhz3+8w

zw1+PSkmFwaVNJR7zHwFZEeyltK+euNv+Q0B2LfgglZg/sPeoMm6WcTqMGIHWgE7tB3fWPOQbfsuSQzq

MeNdQP6RwbnRBdXbgs1kZLxghFZOk3DggiJT0D6Sns
jU3T/1/qfB+Ip8A6LhRo4OYZuvRKT7IAAuy96LxoGocE+e+OofMaVf2IbV2HGUsPiZM9iwKQl6fQNzHK

7201mVsF9XhM7uQNMJyEl/xWlY13w/pDP5/BWrYUumLE7WGWVU+ZLPPiwm+k4uKjMfXLFiXhImogEcUj

v2m7CxlpSqmB/naxJJWP8kIm5/eN/S8Z+HAvKfJuHb
1ZJsFOaZTYnFjoR3sxyyeopLm72Jt1HFXWpgkqPVA4QW2nASNM81qUWFlxvHdVmKdX5Ivo43uR/rruz3

+09jxkkreQ4fS3wvzVSlR2Qvd71ByhDroKmKmFojm2ZHIdtZgvbZJZdKO8oBhyone/M8v86FIgg0WzKA

+wcVnPkC2dxn909626/tUVV21J/SJBl3uaB2OHt6yv
T0TH3xa7aAo7r8BXcKs/dgcad0iW3n3c9I8Y/7Odj2Malr4l4BCB2QqdAA1kStT12MXkPE76wj3rj9zH

H9Fitd249fE9G8FzbzZXnRo4N+TZqKr6psCjYVykc1+eWU9HrE+TcKTmRh+mgflKrppInydIBewbJdRf

DLMXded9dRm1vAxaUVxsZ/N+ZDKfCIdUrA3dGPdr0g
btdBhbPTtdjxDv+VaBmcZyG3dRfzIvpgQ1QlBs5jG9u0Rekg1UH9kwo0AU8kniTD1Abib4aOitVZsc6N

dZvHohEoLhdXIE4HHTEF3hnvvkyGGiYgjLlD/r7aZv1I/7v+iX8vt/lW0AdBDjwV/6+lYe91kK/fqv9W

/bfqv1X/rfpv1X+r/j0RbQshsu14KvwrFc4UQsI5Mj
nRtqLWSw32yaQMDCevwpe6dlgiunvY22LIPUK90o/p+05ZVuToWHx8urHPz2VW4RciZR3eEbEFamHq/2

4KFcSl5qbk7c1awKF3VRKj7kaYDzVPSHbE2cWCAf+f+5ToJtB9a9o10Tsb6V28kjFv25xekpQdW3IvfQ

piZsB2XVJtQeO+IRfi0lcPNbEKfA4PQ71bqEM31mtt
sqb3auGB4ylJGJZmVTPfcfBfx6gmmVdbF5e9yzz6k+kD1Lo5evLlMhyIj7AfIN3LE0q1bcxKad3u9PBE

1qogLf1KAotnBvxkwADewBkNvCjg/BnUYyaZaPNFimSD5w9R/mmD3o6ViowYo71fZo/7mhGpRdCiAJXJ

y4ylSp2iiPiV7VgMWb5TjFMoIywQhT0Lal2NGb0llT
Y17kfot3e5IHEGdazVftS0r00ahPfSDRlQz49lAACqBiQIHgjR1hWpOfNt4BIH+QUJo1tOZFKju3X9S2

OOoFUrIe17Hge16dtzJowYSuuA+Ev1B7AknAmTTvCX4NUZGbj9J5FJS2Khj/QU4gQzT9Pi0dSCU8IgZ+

kdvM4nxotYeLQaD9skszSrB9y4TImNDTqbrMmSmWi+
8BbrhEEhebPY8KGB92lDyBJabsWgHNA+RoDcmCscsD9mrKvxmkKJnL5yOo5306z06A04ye35Lj46OPXL

dXod7heL8tERT2G/Q3Te131xuJyVx8XsxxTNpvwnZeyR860K1AWbyZPWzDDBltfnt3RAK8nxWW1rRRH5

kxZNA7s1J0UOSxUXg9rnXmyvYMMhBVyk7/+gdSVQb+
1iQQcB4zmuAwL5oQB0Vd4WT+G9mIHvoG/Z0ku8kGSmzQEzsDdUBSG2ofazZbIWii1KE3ZIY4cJz9ySlI

8beqEC0Yg28csFbBGN6ieybB91p9zA8rLxiTq+mnTXLzIFC11p3ZdlRiTAQXzwq923/WMtHytuAaToIS

NJu5T1T5cLqxJ8A5VpWkoxaayY7h0rFPIpJhvlZyYM
jXKa6XGIw+dQvz+xvDKYwamq8mAaRp31pZcwma9S3X/+cRSyuIrML+ppIwkCtgRfvxEAKwVpNax08ePj

9P23Tnb93sCTM7kRnZeEYZcMlpLtydCAXjpQ4/4fHHWlmRBmtdKk2bCjuZEv10J8orMvJtwRS6OqhJyj

J0W1xIkaJNm+yRfDH61XF9/qUGotUMREbMNvJglqp0
PRJQCbVRJHL50x351pLSgu9/EvQahq0EfL1QfKpCGI9eqCHzEA1bQTWs98OnzH1v14li5vcA+fu9URKG

k22N3Yrq9bZwOsu6cTim+fO3+If1kcPqf5VtNEgF6O3TtqUnpPht1T4M+jfiDpp0SxGOdLXVWOwE5Z4g

MK52DOBDfaMD27srQp+oif3QG1drTnhRTS2HejbY7q
RjockvrXhRZX/kwiPN4btx+JQNkstRJZV5qfvL+8s1KGeL2w+9veO86lZOggWkbFRNSRUhAJlnXKaM73

bk4jdnzeo/Qc6QqLL+/SKHfUJ2LqqivvsymTn1xlAv7b6SwWZ+GWUwyjQLP9J9oP4T9TQh6Z6K+9h3Ef

+AX+rLOqGY24+uid0Tv7DJOn75a15GkuAqZorndn5v
4hpMPqSGbkmskfQmBSfEzT7E8bSarSq+epG/98pei5Lh0DQi1hYN5tePXdcizJs/zkyh5g7cuFCrqfJw

4IQFTJkmOnK9FTIA7LeKnzT59o7zpJP8kPL1u9SKgZHLt7o/Fw3KaAarMcxI7G9+dl7UyEOTE9p4Ednp

rwTo37Sa/IBWzMdCtrfkiNP3KPD3t9r6i13ghRI3k/
7bfwfgLSX86Inr1IMwJvAgfqrf4N1gOrg4RSxLpVL4zrFY7E6wNHuBangfZW1M/rN0+hI8toPhkkIdIy

1pTHN5s7xSCGTd8H7IFs0mkq6JgY801l/ooyRSzmfsptBu/ygcilmDu2rTp7ewwEtZscg5ujIfFNu9rO

GGytqdaFpoSkvQ6KnKZYw6hb8KD4YRyWqgvjPrFoFQ
7aDm4T4Vv0m3XU4MBjOfF0XbVcpG3TTZ/aUFl2y0h/cwEPQ4m2/IyxCa3dB/h7dZ1W39O5tJPwjczyoH

8fUbJr/LIC+Rr0PO3mzywmwwFNFxNPOmwK5wWbK2QGoGMwlJOiJFXUj6x1JJ5/+XKcZN6OCPGQ0dYlTI

K/W+R3dXPZxgRCJxiIvHppgXtFUnFPIYPeA2TSdWHS
Gjew6t3lHkaVk2+6ggJyeOu+dyCQtBZMfaGCzs2keUqKwX5KsL+km3rkqM/7tjjGlyfc8OvEyhX2e7+L

MWDmkh6LTPaLC144iSrx6WsOvdE2Sfo5M7DgqsShlXInfi1DfjBng/fwAO7PMvMAc5V/IT0xJvMa/GfW

8ifkAI/K9DrVOBlges6IJEDupaVL39zjfJheJx9yAU
GYsq5ACAsm2dI1XMbWzkKv/COWZwy/Qbhgz4f+xVuwUkDsoWxasjKQgyMlZTYF+9HdzrHKY4LUxzHldX

wZ3zK7kMFYdYeeM6e4IQWZOyO/WbqoU5luU+tUfIx7USoSDbjkXoxOFNt7syfkoCYansG3pUOLaxemMA

CTOasWRHPFQL6XOTmdxIQIAXxlf7xHnDDTaxkMLy6Y
TvtUlYdgnqmuEXNQwmufDqPP5MCVMDkd140FhkeI9HoMwpSi0cAFLUkLmkjF5fw93UPwKtig08Qkrvcv

xRkxS3EsH5k8CVKPPijfJlw8tDQWDr15p6TLtMjVclC6q3XPaE3E6R387gepOdowK+PGP20C+3P/elqN

j1oepOlzbmSZPCXlb0qHL56ejFhUVX7jxg6S+8yHys
FCVgYeHqe1dn9KcSyhnARs/s6xOcvDtid5Ab8XxbHm6Yj1DvC1tDAc4bVxthtjq42XOmVKFcMt1kGLiH

KMt3eEyWP+TAJ3pp2kyAk0XBgbWhteXyPM6Aviy0Ku551iAJh5+v6v719Yi/Us+6PICZV/vp6LLJgblF

hY04rGBds6E87lM7XT2tYF2RI7jrbM6ljwRdLicf0z
LWIMx6VGlPt6qNJM/UY8To42Cfo9WeRwvy2qgee03nG1YKhV38+HkXS5YcbvXlSRKVQjAat2uUUR6Pas

Z6rIPj9WCEbVusRrUo5XHh0LnketeLfz7IM4gDjrFE6yN9sqN4GP128HDt+9SRx7/a9lt5LjkgXqtX/b

Osl4cOjLrqyi6wGAxJGyAZsXkYM4QGW2XcX9gCQtWc
ytQIJDcSnFAgRIcHcSiqTocP+nowj2ByQ5f9b3W54c+2eFj73whj4P6bydu/KV60iG5evj+KKlVGY6SC

gVJMobkWGvASOJcHszcr34SNO18rdaEFtN+7fIfyqWkqRogf43ik/hC4OXYlbUVsnqTajvuXaosDwXOL

v4sEI0TWsyw7DTTeyc7uTgUIPKMTK3UD23rHRNTEqI
IgYH0L5Cva15+5pqh8y5YUYxueRW1/97PIlGRBtlwS2dAE1MvTGxEKn4fBPH8JzuMVJqZl8QHhRfmdl2

5mpHR9qbx7SFA9uXxmIdl7lhfR8va9UOb28VRCJ1E2sgn7S9zhK1/poAO9cHPeuoeo0bRlEICBzB1Nml

4S4WWexE7VcZ5bRihCN3To1X+9KSncKnYYVh0kA0JW
wxw18OKxhWPC6xl5IWBvtlt9/D2/5ZCZD4gpLCu8v3T9KaYlkjLFonXuLHFLHRQRR37UKzKQQ5WjayQI

RNupT8A0iDlWvAV8MuZDtkTGuWtWm4GIP3wZliIvDfpFaeWw7hTYGaDb/FWrWn+VT7M+mexJ9ujZPI+J

yGQrh7DK1UxCNXrocr6bjsKXiLPQ9w0PsRh2y5yy8W
IlwYPp/0M2R1o29RspwNgl8j3wsCjM2qcUCNg3M/08/sSUv5JfbQ1/ekS/cs8v2ELu5RKHJq7vNd0/+r

JlKjDX1b6/PsRSEc17dFnb/z8CeV/lhpRlExnsf0ioq+Wi0lOKGFcYOrsPQ8+AgpcgXC/0fozE2Kek5f

NXiLna1ZrMHh3iHVY8c8R6g63/HNo99jf2PN3y41bX
Uj6DUZU7qMv4zjMz1wqFRxdZ9NA7ZuINtzo8CB/4f1xebocbSooPwgwR1kSS4TMcI21Nv+Qe7/x5GZkm

Y6DPN47ik6+UZhJPa8yWhB8O6nmOdSNWTNHqf6B8ajYMMyHpffKy1YtkdTgU9scErCHF6eX3tbaufuKR

WnzNDL//b1KdeomI3U+IIlZhOtXt11Dpes+703Xh0h
u1wdjI3UMSY674JSz/+0Sdb/2OHcGg4zg8SPzqYNvD43xetJKE0JG9DW7/KXENv/eZX7R/2xVjqryb7h

R++Fta3+88j/9xu/arSv8UM8p+XyAZEe8HBSKOH1UjKfFfDkHaI7hmPGdE5lIfXbxHCvUA4zgdNBe6gx

Zlxq2GKHg/0triHRZ8lNr8LAEJzDNc2ATquyHHoSAu
U/Z36diV5MXta2Q8JicREvmeu11sT9+2+6stERjbLx3ghtV0JQOwJMfc4MXm0h6xpB1GxYc4h1bQDjjd

V7DyErj+4AlkzB/kdF+XnHYLE92ZHHDeN1nRRDcc5jY00cEfZC23qObeF8TD2N5yf0CPJLv6DSZGgqCk

boPO9FGwdYa111uABaiOJo4Ix2fxLtG/pM1qr4nte8
mpkSRVOdBU0lX/uTjE/ic1gez748LIno1bjw2BHX2qn5lUvU4zTH1KO9sxIdwv1BrYb/wwdHl39o36Sl

gbBdC0cixOmbSKzliA40V63zxnUNZgDeJILptl5WrKxPoCm/zDgzDg9JUui7OD3Em5w7L/lIrlTO45JQ

yUjkQ53S7Ogn1+7WlPOuECNuGIrWt4awgaTY7S7wYH
9nRj9GLP/Zxpv9Jdnn/liU9x0QtIifabt8L3GvZt68t3AJ/HVUhyFfL6+KTVkCTkdpdPhk6KHSWdJZfS

PdC8DvHnWf+zMFkscsLzi+SBCxrl2AZVvXMHpjxzqJBnK6joCkCiTLTs3gmRX4rsWkHqj8/N/ig1K2Ac

irdBSDuapbP72GmMk+u4ehyPcd5/ou1P8PFkfmqO1Q
Zwz69avzb4SQiVBZSFWYv2BCfyC/ayJDKcGq+y4In5YLnwSE1cgiZVxzjkWn2sfizcZE++rWM325HKVH

455EEgbxN+xkxKrsRrb25z9kz6JYlmfSxlGH19rinoOH6Ns+g1YoJtc+T4Eh6lEwwElFTp4KhPYzI+t8

eB8B8rYtA1zMWqRPeTW53Ej0N23HXbWo8oUwusqYX0
eCxew97MnFY7DW3jyuS7h4R9/KWyu9pSb7bjKxILnfjRS33NO+mfphWloq43fiMYVH9+6ynNi3kwHhhF

sSIy3IVfzbi8QU5w002NXJkvLj4EZCRVhY3xIdZRZwohsmxF0pAS2kdr7qMuyTvzSBsCM4Rlg+ebKfCK

0lh4n3TC7L0dH4iplrXf8hPkLm5gCNR5OZ58z/mWpL
Q/uCSt5LdME/+Te5QrN54+/nwb7X0t/TrbYrYJkg+J/XbfTnYBrSAwNFJ0yQaV88lEUJodlEwJ1LqyTa

7zIMNiYyUY+U1fbig9rDOVvys7c3RP0XVQRJTztqdaTQn6PPReEsoIPlz0W3iXuqc00iAQOyuJ18Hq+e

o1tRE+n/2bnaZdLhprPY6Fl6jc8JCjGBAaLWZEfUPg
IuoIEk4DV9vDFFh20lTgYJ4zTkMV/jX/Tu1yYvUq331YZTfjlFM/xHYIcSybSxenUZHAHD7qIdFjdrYV

h7Xsy7MqJ570AMmXFtqAI2NhnUuskV/DzZJ7xljs4aPpFkoEaYqfFemutqf6aKNWIimCd4XCxjEbxnNi

YVbmgL56+DglwbHBl4h2Ns+UZbsxF+y+1wnESRIHD5
+Fuz4Z/v1vcXp92KTHq3ML0cYde9aynUJgyvMlrCqrH70260KtTauCkNwsjABLcV53diFh1Ba8abbZnr

eMqu68Q0g2kl8+1mfUutN/YkyTDYpAljsmAKh0XpxI7xM3dISUQJwfiBFErJOt4uSNF5KVnnMK6eex4O

At2e9bKgwVFzgj7Fxa6dPUFgRlMYFTlhMGiBItPa9h
MQnf+fmj93EsmfR1TIo/aaDPvInaatDIyCLfjt36m5Uu1bU2A6k6mnec8Ro5d65Oe2xvATXCdArh3RZJ

NYREv2nXlV58XeiqjuAlBa+s63L0bycvrrkdxMKC+iEZBQFX3RHgNANT2P7Kr2uTrUwl0fgkzLBCw/CY

RL1QWRQMLH6mKu3hTFucodi0O9jgbAv6n/ro3LUgZA
u6iHp9DqRwIpCDQj/rw6VGa6s+KljgLj+GbnQ7y5zIAuhx/iEBM6zXACv8X4aTzNkAnc273KIVyr5bkd

bRZAvSdqPlHgo7ouAITg06/Z1XEieDcmekg6u/jZmiMlr8weRj77sBr9P3O8RI69OomHl9zwMoTOGpq6

Br7q/7R0ieD/kL5eBWE9/x9epIyJD4JcVznzcumj2W
llbbB5ZI3NOJ0VeDFWY/DSeW9qGrz8wUaTN6n69FE0T4NdqJK228arO1/GG3yEci7RviGQqX0zcF++q0

zwgDgj0ww1A419oegNLyPB9fe0GehaOErgIxndApCe+qpoB4CJXdLyBrJG4CXv+PkwzRgx10TQNPrPUy

birOUtZd8Jh634/nV8IK7+7yOi0xu1rp6D0hJE7S52
xhPW5wGCZS8sr/aS5gSC7oePXyORkhGMg+rJddFK4t9YbnGfyDzfIfr+pwIrWGqhR5ston062T4aoOD3

hTrbdsH2hx83jdhkMiFgAXM1k2bA/ueZqsEng2wh177l37itNn1+tmzlEVGGa1uwgNMeAnlB1vSM6jIH

GVDTuQ8sNJ8T4iPdo+/Qr5XIwAmS5af8IetsU4fbGf
gkh0j2bjIy+pYjkMTDutSRd5wYagAT0i/+8l9BU/jpWHl0PO7SlVceTIc1/9RYrcRNLcWeYZxLKP6kXs

p5CTRlGNMobwfN6vVwVU2uwUkGzf8hEeb+og+3BEGXkgtJ3LIQhjShT+fQ/mfyi9QfdzV0rmLq+RXK0n

9FrPy84s3kYiIceBZaFHCo8Yk6pJ/h6v48eA4Ha3P9
8a1X01bEwb4W0V+33yfRlEw15pevMrDyNtGbMO+z+iNllrflaANer9CQ/UpcqPAjMCxJCrkddrJoOT7x

y2/LIauC+f3z2EH8k4iPpCo4ta+3c7SrOT8hH5RZ2z9Ws4GvVBTriTI4Irs/SyoNXGZo214uf0cD+8Z3

LCetb11OfiF/vvkwFq3fL4rEtcRU9xpDHSc/lfavph
fKcknWAwF+ekuuBGTS/H7RCP0hNd+YJerfxcl6SHziIXeAKt4LgKn66M2/xHRfenf94n821Jegn7hG8Q

Xv7DWgF9TKkFYalMPH2md6mDfBOUgy90JKquPZ7ASR7ccvhYz3KZz4tiOpnP9BkYrohfkLpn9E4QPH8U

1yU3C09piMw/EkwEVK7X4UmaAu2rRpdcYi3QIZFmI/
7dyJB2CTRh/3heeB+YHBZgSM15TM2PdrLfvB81tOTDox/HaTPVEKr6j/ZfaJRC9sjqo8B0CAU+uB+xlS

3rLhT1ryR5+ftQkJDzJyRr/BiV7opogBh723USXYrv8jlanqpr96ryh8wdQpthFyQY/uXpnlLfcLeHKt

M4VQTYap9ISNlhD2c2OLJewpwXGgfQqs/uj421iR5M
djX98eld3JGHk4z/+Zv6mZ+TG8joJl2a+h+fKzHaBH7QAZLEo5aPnp/Mi7cY7QCCYSl2nFgLYhdTMqOt

HMXcymcfeiJaZ2jcIw1RKVZJOTJ0MjcvvUu0i1Ndnhnj5B3NrYd7nl8baCXMB2GFTJxzPcfZtw46nhqs

ghu1+RawbV6l6v+vJAt8YUikw/VaV4Q/yt33QHNRYs
8spS/oV7sLE1jIyu5wruPrIZy3eOvm+/Q8BGrQImyMmRzxxe40UoRjeHj6eYjgdd40ZQDVSte4JyG/Wq

vDgpbRmFAbhmx/1qnHEDEPJpzplSOB/JMzRhw0qr5K8YKpJs3RT8kSqThrsFiJUMQM84LaOgPIc6dDfh

29MDVqWC8pPa8Hn079xifyKl4dxdl8MzSMZmLrbILo
XTJSf8+xubSKhBxHRWIO5v3k48P7b0CCncG3tcY3o0C6XpcjaCqbNw2JpumQrri23TU8E917h3p6ljlc

XwSrmRkkeWu46OR1M8xt0jzpZw5Esrdhg1TcIEeiDvLNTI3SkSYDl0lGpfT6gPy2LxxFdZmeIlfyaVSJ

yu6DBwR9wzbnYIwVP7hE60k0N0xYVchvvHsAp4hZ+G
092wohySYzcNK2KoFgckeHkWAJw6cbmYaSUwwFM1/mvSqydHosJJbb4hZ3NKjsnt/P30oChdzbEMSkb7

cLxFJZJhJK0WceaYV3hGbd/3fBHaXW1aRyhV/Uz3HK1A2qlHoSrSVyLbnx9piDhX212Q8YMGmq147AqY

7i+bgzrbp1IvxHjroKNUpBWrplLu6eUagL1US/r0Yt
dt7KuIfGD2bafNAPdsB7lpuwg8aFoqxCDDrW5cCbGpoLbGL1Y8rBFRr6nRAZlS2T5Q9V8wxJkNp4k1PS

9zM511+PFEZBnIvP18X7Tdz4S4iU6Ski/RK2zAnUYNGWcUvroOsE/VnJglM0Acp95vuGe5XeIi7eULXy

6DiQA3CNmQkHM/NTMyHwAhg68wY/4yWMi9QJ9vkGKh
9injkv+1b37nnpUkceUdsSbKtH0jV46DBvQ82mI7sukArenAPQ9IaTz9wgVXdfpSZN/FEyJ9WsXeDaET

tF9BqrV+wThkTtgZTEhl8OkPphE8HTo+fiDsw70UKm0/peWc+UfNP82OHVby3kf5yBG8jtmT+IC+qi+f

3lRWyE/BxU4vsETjiZ/hwF8esYwI/WwYnYHkBLlWFc
ASrFrWL+A9hfrBHfx8DmNR1+i8w5bobf0ddw49bh3RD8A0OvCGrCU19aDbTwpxG73w4RiFOVcAVB8Mpm

C8r7a6Dzfh+851XStN0tWfiGMnaaZB3yO78gZlp/q546XLLplwB29ab59ln26knsnHs7qsTwaKcPgdl/

oWE9TNbJdrX+I6A91j4qJk/sC59Vu+cDjF5a2DM5N5
UOjJ3gRcQy2kk3PtW88z0Cb4besC/cbwdXdySjlr0jCOHSdaSTWc0VrMX2f9SEU0hb5plsjzXIg2ERXM

Ol5Hq6skL5W7U+XuUzZB9stGddcZeH4dYEtw4+UnebuqC489Ex3DVY5bu7Hhnfg+vmpu+W2uu359/BXV

Z2IB34QC5KxZDouhlxgdryeY5DxCwPzUBXJaG8jFtb
h/YAdDbkUTuxmLf6C1wcsHkFHjN5k5HWCDmSKvdpc+g0AcVkZAuK6PUanC5PoBmwM42mzzbuN79hDsVa

/u1SOEtOcP56UjV+MG0oLminFcVnibe5aAoSn0rkc+ZS6M9UoNgcv2e61xh2e0g2rU+42SBQ3aeRd0aX

iJpDhEKk0trm5BHCRX4tIjGQ6QmpXOAUr0YWmZsR5R
b/DYWoWJvktYO+gsZMcbibhiBNRc+RBf7GVvEzUOH8sP9F8N2ZcZaZ+434Yvbr/VcP64tDwHQdbW8Pgz

xc9HFeBRqZCJpseY51Wajl6dGugUHqyso5NxJgj3aonG0/nWyvYNt6k74ON5fHKYPSoi7fGPhmZlSPeV

P/8n9K5NhjT4T9W+1a60TU78QrsPHluVxeMq9im4um
qAV22VLgVJM61tjoTwn1tP0N8dVZ23MRv9VmiclkIsg/jftIqszXjxTMOKTTCMI2YGVYxRbywU+rxtTZ

V31MZNmUf6t3qsaNiimf+PfWbk4AgHD3jVJSIOCrxXqBDrN8fgPgSiRe2bm4sIWyzL9EeLFZdqdsKwm+

iyK3q7HBSO0T30NtAEVC2v9589Okr5uyLWX2jNgSv3
n8P95WgaNUGje7pDFl9zOqV9AkZ4XOTZgEwwg6BtmGJY8QhZKBPGNb+T7Rp+TCLGthwS3/7XwxMqq8HP

liXM4gVtLQMuYF4q6Lrl0C5sFF2lGTbKq1JKk6U93EXL13z7gpOC+OaarVojOwOu23WW024M8Vk33TPw

WbL470+LB1gcY810g862Z8ulIdGliRpB9Z25Fx3IZa
WL9jOfEFynq4m2MCDlRP+/Z7xXIcyKDJ6LiAwkAir6I+D1r9EpziUO2ZnfNudXzMvCRptf6bOl2tG5YB

I6oJkeuJgyYRQ8U+IZhuK9lsPrrVfP6zoRQDggmDyyzigjNM6p4WsYgv78HYs+4gFeGBR/dCRh44iTVN

UpD8RCgE//5WKhLOMf/Uu0F3+CU2TsbljrKDO+33PU
Zu9HQp995fK3WvAgEnWEbeHtwGtsh6B+xytLwI1fCNyQRtgvztT16+VaNFYGNhVqgsupX70F/yq1ZzHU

osist9Qt6TQuhA7I72e47TCyzXT01spfANBQe4wMrhj2k3gjTg6xgNv840y15KyDiBzelDwefw26zCS6

ud8C/ybLPxfJXzU42ySl8i2UHji3a8w98QLqlu6pV0
qc2WcvtkuLFS9MvjXYQhtGA9zGg3FQOX2EUr9+YldAA5lC9GMkVmzjiEITePi5H8H3hTPtQ/b47Vh48Z

xZXNF4+WKgbg9xbPVJ9aCh3PyUjmQkBZa8rZk7Sckn/mmt2ser8090BOQq18I8oLLH2s/+KOF6tcOws4

HTWVOQX/Gc59c0pf08laGXiDPepfqla15w6Y2MU9OA
CCYRilo3iQelvu800nIW4CVQBzaJpH+YLDTEg5hFJh3jL5WWfifEu7MsaWdpHOCf5QeFwlvHjpokvgCa

k3ugAQ94JkXGSccDxcieN1qItGb0XhejbKUo6mYtrl8zl8uJPBaFyQhnDRK4S+KHoyMxO4OFaz35eSE6

PNv6olcr1X/gJMhSIhdy8oRAARsPbgnTaYmCoA842Z
7T8ASMIPWV7+Bi20QUB00HRHFdliPG2/VDrW4TdPMediovs2NMX6gWBsAc7szP9DYafE3o8vGv4h/UpB

XamFwowZBMFK7+HJoALL31nCnX63VVnwBric1DV9bgXeTjApaPytO4F4bRFQ4e+1eaeSPs5/T52mQlFf

j5YN0NgJmSLe/MykMW7ypGxOywzWe9v6IanKeIW6XA
EK+dJZV2bLoaGQ8rHPcW2uyF1yU6ddomhlqSWrg2/mhDSf/o6DGMh3rEhqklIWvQ1a/NkYL6moy/6R6+

D+X1+uLJxp7Xfd27+MVNxBg0/P2a8w3pNupLtKX6C6kyJq8hYn808XusoNjC2EIjd023OBL4Ze5wF5p5

9szWOV0IPYeyfxmwuzlmjSf9tNn4bKrOyAhL6XfwSt
1BMo1/0bStgypIsMa0YNFMevQMz+VxTh9mGUff5Y6DvkJ0VzDbo4d/BuVXdclXg5uzGCUP+yF7ZKTVSS

K/anpuSfK+suk48Lzkn0V5NoGaUaB4kjOwgqfNS8w5qAtkSiDvMFIfJrLv8eYSvRWAWak5Bstftx5Lv5

jLABHcixQ9N4w5wODZehdjN/I26u2oV9tk2C12If0c
7tZU5wtDKLi7mdNzpkbqp7ESmzxEurpQ6e5lpIU6ncFkqRlcXgugP63PPDYqkJ/WNXd+3yOnUn6DMcr/

AYkXnTdOaK3yzWSRkzAd62BwGBc8qbWTteQFnE+81QMd33UYx4dp1RTrJjmUEKkQOP9RXEBiHvYoiFCd

/DoI4FlYrtlXS+TfTYNrqWIf4+ZjPGR8elvbG6OwL3
AfPNd1TSectCQTcxmfQ/BkPBZN1INZdgXgxJfmrKkYlZJkqFdhCK+ijOeWXkUkxtidBn6COzgnu7eKGo

T51KzWjiJnQO/J3/YCMtC090gwx0WnLkJArKhNUQzGJHIzxN9kz8sYp9aqSE8sp4WZWlyXsqmhPArUx+

uTicn5YlubYRJeYHx2SJLle19j3PprYApUrrUb5Qfv
72Kb9K1ru92fCWtIkYV9HCoTYt/AaWC1d/3o2GMFygUtfXSG9gU7RoEyKXYd9xiCtUWSiMB2BZE3h/Rn

eWWYhVSBOP2SpzCtY123f0Hyp1B4w0+ukjKS414Xm+YSWOlbovNWzsHgmCt1r7nC5VZvu4Wxuc5l0+eE

khVh3ZkWqskvILgvtclU8wne2XdI0DVvXouu5ptGja
T+WVvQ7Lj9b+7dbJ8tq3aqV8tCUfgQjbqNySPAh287hXil8da8v4AuJOe0KXGG9R5uAzqLJwTGIXmBof

qmMevpfRT881ySCRjrEb2+4nhTgFmOwWiSf7eiVTo0iHWPHNsVcjwOTfe9H5G2v7ZWXVbJ9M1zGw1lj1

WpNTyqkBTxPCUvYFpPTIv2tlPjRHh/PlNOOHSYezZ0
FyhNhVRyemYLSu4W5IPiYVMGkxcoxpqJOjPnXUlfHHV6idYgWHSuqP2LH0uJEYzWPlhNGUGkEe66ngDh

odSp1tQNb02d2KVDzjtkrw6RR7k5Uri8Ygn34rV/5z8QhxRDM+C+ASzyrXFxfPOQd/yzJNeL+heJfy+q

HF8cX1vb30XMuSVlI3OUnAcGdopbPhcu+Ms5S1cX3p
OYn9B1fb9PiTk4N4crqvK8vaxENsrLcPJKcQgMIFGaoP+dcM4FnKj38GOOaZiYUxSFEjGXjz+vgHgPuT

JXF84b3DNEtgkuyc3Li8cyIrWPo9OaO+hkY/EkHn5br3P6WWRu0T9PGsR0ob6Fx+gM0SW/GoWFr5mQhg

0A6SeLTANoI5flGtivXA1VyzZxD5fzgzGCf529jIoy
zLztP6bsG5RKRZyqhJROHcYWAXZJHXJjc37OVNLCMsT0dZ3szFXeyZ8TIA5eGCorBDJi21nJ1G0aOgRB

5nW7QL5c7NkTyig4izNQpHvbu/a0jvZxG0bz9xT4xtxtKn9aGaxvYxXYb8XfM+aUD4YiP1Amh8m1CiBn

INFAbZYwqcXeWq0u0pM8hDAOtT3HY2K2Zj9GZq8D6P
2iXZwbqBALG32OKibQ7NqxY1kmPYeihdhbN/UOMWvSFTkgbLrNYUKnj8Ek1eR0Ice2bK66M4UNvZnMjE

uRmB9l1tGgDwjIwZN9HGSeEtuEva9RHFrtCH0EmQCQrV3YAfjnuFnrfX0/Wia6liMtV1E7TF2qTiND1q

b3qVr54H9dvAvlFMgWlfFuh6Y9Z9QzCx8dV2pMm2EM
cxN2fZdcp7kHYfeBpLVnllobSD4nnfMtz5P4tmn/iF6bP18J7tl+pj1u+sugbxNMwfY4oz3Re+N35b4n

RRlL4qcTsWBkUzq3MHqrlwtb6U5kpHvmdbdMJdkNJxwdKp1zz4drTaGriVRETtZjYIDQQJwePKU184Iy

y9jGLegNaJktSNpJWIEL1EUehB981/3ztx7nuGhmf5
jueo5WButi+u7z+YldVBduS7/oEVMly1OCn5OPfanH7bN8NHyZAFtZlftBL4DSPYgPtv4FjlLY8u/4BL

gLccfc9HqCO2EcbxLXxppFkjwJkA0IgwcJl4DtAbLQ69NYFOq0x15ZFjFeFr9Kh4kXob4S1mnYPDuDDb

hrz52y9Kt+xIUbm/HmPdSSXa097ZLIM2d/5ovyj6K/
6RvYvFkKc+3VK2vWmpZYVIuH01KEH60Jrhi5WI8mfTEQtPujax0ByIWb1QMyR7m5iQWMdA56VFKJ+IDZ

o5HPrcXBc4EefD7+riCBb/EVvIIl0TpqBswcmjQlkQdKRv3MxFPvhQWJ/bRmxtc2ZCK4Wvnhpgrg561J

vkbf1v5ZfE1P7py/WDIAu6YqLTMgnqG5Ooe8NsXPml
BHjLTgG6g29VrnBWxGzygCXnNPFV8fKr636J8LIbWS9ARBCiNzpFG6s9A6wUR9SLgl+0FIAMd3Qywu+n

P+KjUxRiMb7ua39KYM/M626qKosloQ74WbGodL/BCKC9YWsPl/5AP/GP/CW8Oi4iGaWjQo3HQAJVZAz0

WlP7nn1llP7IAxkOU58N4LYM80dHRP0bucqVU1Kvvm
xJimG6ab0fkLELFrAAz4Ug3ZZx+SwgNCbzx+5MIYVWOxTH27twaVJn/82aH9T/CBkB3V6u9kOuEdXRzO

Lw0d/rToX+b+Nc0vpVnW2n/4OfISBr+RYZOeDVyHbJxg2I3/OORZlrIkZACnuGf4Um+dNZY/0xPitBH5

X4/Nnp5xQQNEgdzKOfueESlxB8BAZwIFUI5A/ST2F3
QfLyA5emf5qWQJbX8de64nPuBWc0shtwetfAS/KAm5sdg1yrKXuxim6sMf6panZgzRLWms/eAq5WAOKa

nmY+8gEKhJyzk17+iYDL6Nu4AG6dtDr9/YKftDBKehrrGmD3NqcmrMTfpBAleBVs0WFGHRBkPEDS2yvH

QeXK0Q3xmlu+ZSf7R9+Huns3wUsyktDwTR8ZQqQkem
mhVqosZeC4jWXDT0WPcUSI078+7mgn5EsBVCfl54kpfEGGiYGSeJtIhZBY/ORftrrJ9X/OJUJ4/e54ZU

0Wmia5QN9QozsIhMyqsXgurZ+ddrNd4nzC/kfl2TlWujV/b8b68kdwz4F5KLKjf83HD7pUOXPkinQe0L

MMg46Oh5UVnQPMImBUf9pV6kKt40ZERPsLpACr1WPa
ZMHJF6ZYZq9y9pUAv1kj7EpqZR6Qw0XOjSubRSlQDbn5tm0QDAQMz0o91Seiyl+iPzS0ZilrwyZ/HXWZ

3ui0LzPhhNVqQHuHWnSm9CNePEG1Uo9nwM08/hNou1CpZD6Qu4Bg+RSfFHooRMUpcayj/ap6bAmgV9D2

cW4m/Z7QPgVBBJzzJ+LC8eO4TPCuXkDQuPrgtfVUQ9
Kln/s7IGPW2VxDWR7Ipg6MZ+4UspKp+nQLOUyPqVx/dygBL1kQB7C1/Y9xHiF0wrm5bX8DcV13u/6bds

Adsd7+vv4aEWyUZkhe9TjIv0rOLgr5LIdv3u24/0V04Z7sHm5BYW4GIhVrKwGeEszIX9r3uP/62zb1Ez

JenzfQrso2/S8ZbxTWYVCoYiyYo1VcSgjctBkQqwg/
dBgGxzIB0KtHry8rZTG2LZZU5bFU46BxWk8JlRv9+W1LYKBAgnu6Hb8QwqBBUux/B0jxrOWHYNYEGjAT

ylaK0ybliDNThPpZBcy5Fc4ea608xCcFriGE0vi4D5lRBZQOkavLavgWzWoXMoNiYsyU6vE6I0Kc6Lgi

tPfGnXsYj76WKIvSzoVe4Y1wzrvHSjfrC+o4nBmaaf
K5X/syvtbNzdv3QHdQXecC4Uo4bt2V4dIIbC3YPtiCpzfCAht2ZoDBimzirQrx8dh+cCDIncH8yb3qYQ

mMPPX6A2TYxvqowoumItIZJRb7uwMRD3r/BNatSMHmIvKZRACzpLHIBkhXXgCgaXagYDV9viYKCLS9Z+

BH8p0l2QUMGD8lAvOvKplNGPXLXP+JpFQNwd975awK
omVTunbPgDVwVb3dN+qxH4gy97ne7w15QsoPhwRS7JtLZyrRevRxHGGBORPcEA9s76lub8u6UKjbYxb2

wPB859UBqbhgveLPl34YNYG9IjDXvGBjbxN85+9s7gS8dDtgBXBjYdPZDi4raxOZTaU3DzuwzpIEiAzd

4ACxMjHI6pReDUOGHj6j/S0/adjVLcmATLwdKTctuJ
YlwyG165IzENJTcpBCbk60xGc4ekCfWiJ0mjOjniPFql164SZ5z2Pz2YBBZcsuROZNbmoOtDJpTYiiu7

udr/5XDkTvzAk31k4Q3eqnWxjPdA8JhEJ8EKzDEyu0ni978Nx9yvKi3O0tXleJfrZq7ITXPV0q5QcErc

idlFNJniPZI04DHEAdXe/AkNaOud7ks+6mqLDAvb0e
nD86WTJmrAW1devtbBt3pygEM4AnphAGlEY1S+BFXd6CJLqRNzx50fRFDc/mrgyeNcJJwNbd3xQRVXYQ

4yz6U0khpA3LCWvZN8CXxMT3IkecxgSw3w7z/f0zjtv5Wb8ybim3eacNShc/c7fxxLa2FZmT/B3VoLEs

iXoUR+K5HdtTKfa6H7D8stbCpRgVqXtij/4PBgZBeh
6/hoDbOObLHVKaTOFZq3ZQYnhB+7Vhy69s8/NCNS7VrvXEnu6ok+fuKV8FcE42HH2nw6DQq+gIXi90ni

Ywnd674XQfhKuXozmpnt8RgDzVhGyIPwIWlkA+MO8NPd55+vDh0h8RCi7JrKouceubWd9PoO56CcjmPL

ze0EcM+zzDvDo5GhMDzmGd8lHlzwRBK9lOVD4TMkYy
MNz+FaXXYEKsJTuTI9FejMqwcRfF1UnJJ6u0ciN4/lhuc6k83jI9Z+8CdGWZEmC+YSVni4ZE74fnaXIJ

uq6v+P78xjtxUd7Y9QmKuImN9PeN63NcRxK3I/hBVXSwAHv0nW8QxXv32TW/DZk/xBgw2Im6ztGtbMOA

FJnMzhA6uVcCX2iVGUSAGtEWuI2USh5qgcvoWYQq9K
FgqWNf6U7OkaPgRRSDBjzZ/qTsUDhqvbJUfw/DiT5J2Z+wt5GMjj5tUu+wurch+L8QVaNMiej6wofPu5

Im+ngzakHZw56cs2Bgn+pR6/XHcRM4eIwnpm5/nBC4Yc31nJiP8yHPoqBGqlzIzRqlF4f1gckZRekGow

Si48gDItO7V5tJ1l0E4D5RtgjBuOShS49oELaG2pBX
9xDb5z2iIh1XIyOuefnH9/uW/AZD5qkoso6pQgyWHl4AFbtvLWaOLaQee8ZMDu6XmHHuLuUV9lBQvQwu

tN14FT/UUtBLSw5I8kSS+8x1Wn+8Nu5BkOq8EDuPGaTydXgDM2OGi0mxI69gYnpoUJZp1tRSsyd9bKGq

LNi8YIJ3LD6pT7icWnNtehOfbVrDF+j9G9++IWcMKb
pa0W9Eap6xgxeXyTy5vW9hr9vp4lkTw8B0fktNJ4vksolvxbmJBMRVusmnS0RI7111zt/CaRdNNchkwJ

/KFQJ08ySGl+ClJyPaUEon3DDeqIZ8ZzV2nyAAv4Y7Lc9nYj+MOA8LMD61Uc3SRU0iBEZBLLZXJrVK2G

qvFlLRc6DUMCBfonjIbTiwAeao68GC8yFmypmNhlhg
BOAxzzqQosO1BJoprsfouvZay1hsfwPYHLCeyGyepFA9oaxlT4QtTab2792kJdbpZVHsKgFD3+wT5xrh

GqfxjCfU9lObVDl1UMs0eeQz/JMhwMCpd3fizozx97GVlWwU3Zh9NP2/UmUI3Vv8/WufDw4AoGipoDZh

rpq+p26b24+ED6g8RN2sWuF/J7tT5UNubjZZa4TR6u
Nm7tYlW/kfrI358Oo4Y9iwN4LpyKbV6OV1bgTlx4cNeMolhYvHmRGkfDVmR/oT0dFS/OcW1NBVesYyF5

Fl7Bu2IWwm0YKg4YaJgJh6wlzTfIi5O3mhqKY8A08R4DW0mHqShHlu8Ter/OT58oVj2OLicMDHKM3dOq

hxY3QMYBlAZTGUCKGeLtLOVmU/Qq7qgGr3A7vTv4iV
XMzjXm6h0NylPZ09i3n011tGFowDmdkCKhSIMBrzu6EtXVuf+YCQnFWTieRE9c9ErfkhQrNqz9lb66VR

z9CzvS0hh5EI7AFV6yqrlNdKVrRUQMmJN435KDXxuOZBCPFO46YBj1WhjbvB7RrOJ42WJcGEahVuRHq2

X1n5Wg8QM7sv4Jf1nr5EukoMzyjNJ3oZvvjq8auDV+
x5QzqUhz1yrqFgGs24nTfsz94zdVVVF5K5f4+6jk/AiS74mPbaGA0hh5B8MaiC4LWlJYSypFMvawaByO

/bX0C0JQ6axOdVpbMSfl0Bd1NlYmMm3LcHvqRKNVQDvz97gB3iw4e4hQ0sobV6ZTto45ebaSN94B8mps

922eDaJsF5Y8ReR95KQn8w7sDrv2Kecl0S5NwUW+W6
FRJleYL+iqbdx1VQ+loc7kzD8i5vWXdqRC+1FDc5UnWalYMy8e0UirdSeiUx/Ka0FxQr+XOPZPBpGuJp

RpnKQOE4lDG60bk59yKIUDnozptMOTMS60xR3A3PMXe/d0T2yxqY71zXWZXLLmpiOzjpCR4Gnb08ZKyy

9cvsjvy5bmTgjKYRd+RRDmJHwEguy5qQbM2nlfazUv
DHNMjUhNWuXcsjBRPWqs/basFt8P0BjhQJbnzqt3vUDq9371hvCaFNyIGDvqJ7H94VsBlWucDgm1z+FK

3hJZdhp+nSdrznlmd7kc14Mk6k1T4ai+jbtWPRqrEbn7VLUGbaUw0zIKXlzX6SP+KTzOqow/xRWGP+Vc

HC53aGZt0xoEjBVdVfkivnaaIErWw62z01X61ZdfOt
y4Fx8x7tqRarVOB0sxl3mqATPwQQhOVjbZmgh8DeBXO/t6C1sGUkji4zBF54vAEMRL0pmMvdPh8fmG3g

n+sbSWVpxdqmQLsspq2pGAtG8pcMJF6hS9v7k0nE2kAJsoJpb2ZaEK9k0JN7+pvKwpuND/OnOkgQe45L

Er6/1X3mJXkcl4Iw88VgzlGjPrciKJC0l38rmsHbM6
2tYRP6dELs1eshpaACyhOkoB8S7ZVi+Io03+bckckwZiRoYPAk2sAkPUERRx+9oftKH8uAIwQuTlzlgL

EC5luJ5I37phYeaeV4WvHamd3zE9rUVpoMdlgfKOcVC1CuONio31nFgKdzwYHLMQ8XGrDMypYiRq7Wx/

Ah8uSYiKQSToIJzU8OsUjukcWx//8KwJPb3mU0a6GJ
wBWgzSzZvjWNi2dzGUGxXhSBGxP+5TL/qpO75P0/EzVT8beT8/JLoeZgFEecug1FV7YmvEcIeIwIgsCN

+xwiUJgVN34DLbuMdGZ0xnXxPi+78wF6eIsfqyis7Fl8HWc+Fmx0aMo2wanWTDNkP3p045EN00GL/y8M

t81jlkasiuFyqh+n1XFop4eiXjUE/rGvB/79jtt0FY
UHJfwqjRn8Xce3CGHAIuVWz6+dIO6TaWclx2l79NWTI9i1OVCSW4AX6LDPb9n5nC5wlIZsDBwIoE7At1

9NC4FP6H3wk1H/iodZX86vMzsBkkBZ5FFy+UXkMK/BNEMWe4bGfln7NVSN51/RgPB0U1S7gke2AggGux

o16Df4OgeC5MCJgDIXU014cavT2FxMAEGv9H16ff7x
EduoNh2ohfn+jFDWj3Q7ftBLbYKR/gxlvtvs3uyVG/gXcdJ0tFUkd+ocMh5jbtiqZr98Vy4ttZYd2u1S

THHWDPVKNIh/vlOjfEyYDJRtnr/YFt574E0r0FwzcZArYTYk5a2U/Ub6yoZKfnU5AZBDGu3xbkgTzPbN

V7BeXtecGxS9DN9hc7TMLCgoA+iIeryP8iloC9vIUF
fhHWtSLnsxe9ty63h/WIrx150m7LWwX8YuVpVznbMzSC8AsZtlUHJZQJo49VQPNHv7CNmzj0+3tO39i+

fu2DhFk+xVPtft/Wrou2uoO03E8+LKoI9gWowN89Tb3om+iq8kpyhfaEfzQYyX2kBTNz4OJxD6NmqOSc

jOexGjExZTczLXqzuwUuCtQARjz+QfJ1lsn3Wwf60/
caaiulhuFPTnpnRUAxYKtpr2INmsAmsYdHOtrR97d3pBY910fYCZj6AhHUK/gj2vC87aflnSY+nJ+ohW

kltDVCay0YGIaFD2Gwnu0vsd/3MUcfJZphr54z4Gewgqgqg8tO4OW+GZc1HHKXoV5HQWiw5cCUn8Z2Lk

b8phjbfxVvdICvVokvqhxj+AmfXy+b+Iw5W6L2zqa2
DIYtxH8wfyjWyJNlAZtTkeFvAJ7VymStFc8y3ochrEhp07JRMxTfGxPOhhKKNtqhzdE8DMYjW6GqbONg

q+eXnMOU6e+hti03A4FuVr+Gh+yz0369jbYk+hhTgznqSOM3CvvQ/3UGEUCFfJQVXZCCBfEqud6UY3X4

QBMUdVVqhYgioDPqES7FBtHAZIHcwRNcffDnhbb/eH
WQDBObOANX045xC7ShrzLLsySEgL1Ne8h8EoTWtQwCU3b4Va5524JVbj9Amc3yMY1Y3ha4rF50lKl7nU

H7W37iM5O1e5fc0ff8P5p9rioB1GPdCbRKa8AgDcm/wFpthzI8Lpc2hzuIeKzh7Gmp6pAvxDHPaelb+b

uPdB/Jm+2X+00CvOWI7z/L+D4Q455P3lOU1vsOULc+
T0oWzkHvY9x3K6z52oJDXRjyXhMq4h51V7Yi8TjLBIKwNkAI8ZRJ6Xofj0Xcgq6GSnPUlXAekY1W8wWE

wTUKvyeWSA0xffveqGKerS6DID9OhX++1pyJNKuIQL7KFV+r4qTZz3eHvJ8RXLL9ShGhIHN2TwpdJ7av

5BtflfcfJarIXc7uLBL5VQwH/YB9tHmOfa9IZeUBqO
ytOEl6swoUs36WzCxPDDMYc4e95shrVYnacTP4qBvLpNzd+3UOsZ+0R0Ta/hM2xfDk/SSyavqYBHy3JR

k6Tx2PGj5ubsUUMK0ifZB8PMHifO1cH1bdrPIwaIiCNrpe0Bsuzp+mXUSZqIlcla/vc/LCIw+cR7D7EK

GgsBrr+y9IRnYgOKqBy+b0zHpyOoNdvDTgcEhkp8US
XuQlj7Bv3oSGLyMW/1i5cAYrl93xUbftMYiaS5lh7ThV9cEUZyidnawle7LJbBI1MDr5MiSbmjhEUvlR

nNiOAaCimQLG9zfTcjc2gFgftfYyCjrccf+JG2tvgkxGoV4XpUwvCZOw+I/XcmHvZsgZDPmZdl37reFO

hDqm/bsHmq7l0H4d3YKgcNgXdwqPyfACB7IzVq8o4W
xm33gIDRjzm1KvaGNmL1d64K0+Lh1X1lySAuoIjaol5b8q5yNsA6xjESgFx1+0An5pyvJC/E2lMQz9Ge

fxviqQ5Ap4xKnGHvP65O97w/3IzKrg5Wkowu/tpfINH2cpMB3nj/4FQ9N1Qy8P5yVuNb9a3pvckIiDRu

R9BLUhQyzNKcs2cHwfpvUseUpMgMHFe5Hlx9nULzLr
KN3m9iGITF22Yedq2h8TsLLO61jKPdh4AI6UCRBCquAD9VzpIG7GXJVel02ELVgfAKkio/njD27EEfho

ygQVhQCcCYI+uBi/ZcrlvFstk15r8L9GZnYxheCJ7m+UJxBIzl7/sCoonBC1ohezbWpaRDpgUODmTj0E

AADnEE8VPW3JdL83TMs/SJO+6vUO6qc2fOakNYaFus
dY7WA/VLwZzxsgrkZFbcY35HPN/cwcXOuuG3DEtdNo6NyqKz+JAmsyzEUWhaH8ljZKTnoa+1i9whfFCn

9IiNa/VEKGE07NB2/QvY36G8WtwKWv4zM47ueFOVaqsTYNQQ6+K/bI3o3RlrPtCryBtbJE4ejpFWO+ww

mjzqsoKLGSgFjr5BZr3Qv4h1k0pzBcG2z5TTSOWNq2
DDn/epDgQoTPKinvPu8+3+62WfZodnhfYuR4xPU/6mhbz1PI6a9Jx5Ff3KEX2l1VBMhzLyIc1IofJqIo

zQfKTrT2nU5lGE9L6XUuT8aLU8iNzTejdK+wdnm0DYYttttBiApAXQcwno70yOhAqcseeQG9JCeeUbO1

MXiQ0s7Pt5ppZCqQ3oj9Zw2sfSo+xwFAeex1IoX/fe
8+X0HhrWdAZMOrZybeikoAq/lHGlVNKje7SFM5Q4slUL11mtbyM8KYjI5zKYVILZUIGTiIIKC2Arkqfo

kpoQziCyDdoMfkNbLVsQ74m8ejBqPePVEVoH9Saz4sw1ijMCydZq6ix5jRsgw3Z5mIPp8S2v7DgO14Nt

s6CPuIiYG7cjImE5U3v1nSZemFMlZo4gf3dKfRl7nC
WN2ul79vTXXWCPhJi1AYqyYfEm2Z+Cp7kPNeL0hwsmmwUPS1A/h7/WxTAXxpuTt44Po+jk+qCzW4goY+

8lXO9hjt/U/Xr2tInPBIQ8tMj22aZrmUKtxLnzxVWbloi4F1SvMviTrRtAcUb4DSkaSGwkjmlfXebhii

BkaoV6dYSLR9KltMnbxuDD07vOUhDEI23ldxG8VT4c
nO2lhlgR1lFhnPiBkU0FieYgm5lH41I3Hza1ghCr8re9wMETg9V3+fVmuN/XB5AfGvd/40Y+V3QPA7Xf

dGK/YE/E/giLdttI+ChdCigqyK4+LT5DK/0n1MmCZcm3XKKurzgl2caEBZ7kKXPPGakiIVkXr0nprO4R

uaXJZ1mkeIkN7ODxPLwLxBayZ+J5Up/oMUpYyTjIxL
DOspyqSP34vSZc663BWVMRExK46jrdoYZjsYbXzB8NhhdhTPML5jn98uxESwXsiVL8mI/dwdkbs2UqxI

pj9Pma5GoUEFfVXOk1ay4KngwkKaoM+dU4cNhIthyQ6oXZwxyQrjJgoPA55qlpIGGbs6BDCqZY+fHkef

q7kn3BC84nx4cBEmOn4arKlad2koJxLBSS9fEFLJBS
veVyZfQfBdQQ+NL33uPjEJig9hnt2mD9VKfkPbv/3bYQBFX1Nzx+VCnBoJvcH3uLG/oXFGjOmsFDhtcx

G6FhiuCE0Gu2x3Qp68IWO/iAsVb0yA93/jmTgT2YZfXLla/e0ke3NUofqP9aQ1JZMremhNs0sUqVb26G

E3I3lOSjR/PPufM3KCfzNFkv1gfDhBJs0wJpKkVaTR
AFNKzD1Kzs7RVxMFp8xset1ow2AnYxXa4Fi4lwjwFRu09XKglFrqP8sMcz4u5UfzZi7fXOAxIaz9YVXy

pDRdC3aaKHM+Xi3FVsBx05Ny8PshmM4hmv2RTjeFBUKGhb21475lcUNaW7KORL7kOpFezu9kov4eGZb5

HhDzsNkkBcZqfytVRptO9le4WtPNHsjVPUNccjb4hG
Ci+cq8pbHou4ON7W0rWCeY4OHS7giXvbEf+N5eSgNro6r4SlUj/hU9sxenMPHNqFoq5eyrxAUIaI0Bmb

KaC2VCEQ1pCybrGyZ2/RCL7nkVRg+ntbj7bekDMNLQVu7uAqdpwb3zcRI0YasSoZhUq2o/F6ENwxAwkn

SMFzpZ2Jm/pw6P+IHb5vqfPKBRGXewy4n1CiEiH/hv
u/KnwaKMSmHFUaJ5eW2+k+1BwD87GDe47xv/c4+NpeQZqAUZ7K3SSIQgswe/MjZT7zJvTHwotyCFhRa7

A84Y227WVRnhc/tJ9DrFvv9EomUUoI6DxnefHrZ54BrsV9RtztBg6XSISjerI6FikWI58ng1YwpZpBHu

mJTYqI2Fxa2JNKaDaquUygP8u+SA2wiDfwjveqwHeD
LUY+qcHJXBiCzR581huecfMOL+Hw1WgGWzT5Stlx0GosEwhroPcUgmN3XE0tO5D+s7Fr40sNsTI8T1Nl

0JB3VY9KDuyTaADyXLVbUvZXnAWWqgXBvkaKDb+vURJ/NYms3KyRE3faY2OELMqsWN9b3c9crjpX9R1I

6lNkfm7lVp2oSaCLsxuKGlQ+1CgWyXtzufDk/+uh/K
hwuOzYssrpzr4QI8wBcxppOD1D9qEWrIJu45o8cO+LNPlsYAI/DdNt+1S2xeoEaPXB4jbwfKkEYi3N2b

YRjeyxrocAjd4Om8qz+jBcBnMsGm22EHxmlmLLSb7oROepoz2/yCbvcWrphA56TIQ2tuklwo+WJjHIik

uLkJEhdBG0+B/dMohlbf3bQjsjh4krDz3ya0eZz1jv
zt8Np4MpZxX8ZA36/tJUMlmuqWdXfYQFq2XFa6++r2RqsMOI5aASdnWIrPX32evpZ05ZRpQRtzKKgskv

ow6vKh37cdl0U6A+RUBIvzjq470C1jQ1B38l6Wc9SINhDRlBWLKaKTONhaITf+Yhw1mwVfdbteOLxx7B

qgBaqlhxLixVuzynnbe6KWCMQxyOszsoZSONzGix1H
XEyhoxU489tbQMyO3ld7dMWOAkEb1jPvpwwxOFbT/hyFPhNwjvlvDoknzC1Qjcs3Ux8BdazbGnyUvP91

o/333NtdMVw+uHqF4/4KO2NWRQ5cytDL1kbioOgH+LMDAqL9hDqAUSdpbxOUDa3ONXsdgysx15TkcXQ5

d4JTv+PhT70t9PT0UbLKemFEvGWDg0iMTs/RSyti3N
gQz5n029mfEmhbECFQMa02ffW1oUWhuldCoZNJkLeJI6/CjNOkf4st0I3jZKI9g7G++Yh6sZMX2L5C38

XJoFEDdLJ2Mqjsj/Qyn4GIJ9miI3BLZooUksWwbhREFmYqV6+xUt8VvBn2Ui2Z5dtJ0KjKP3Qk19P1Ib

NaEbtS1OvxdtsupYSrztRfmcRQUMyaPOAt6w88cEja
28q7Lzp+lLqm9Kyrle/PLPEzrltOvZHyfGQm1SvDROYRM5GQ5/75C2Ppyew6ccYp2zu8OAWwBFsXGrvR

+jc61iE9TBiGuFkcQuXfZXIQrX3DhvO3eRP8AKv35QxCaucJJfwm8WahK7qh2s6muqe/oeFtpZX9Lrx5

q4KpGospTDzjBZ8CzZSQr2UaJJxAXAKBi51t9kzMce
loONQeG7lC4/GPc3ZhHXrmbp33pc+E5OhXGR9lu6q3XaHlIWs4gPa0xJwZLksLF0tNb4zvzHeRnW/xW8

SoqoBb4E1EYoO1oohGqaEkIbyyrD2Ek0lhUxp/Ry1bjcUdL3/NMZ/xjLjktHrfD8F4bf5BIiydiH//DM

zEG+s5gi/atmDIjxNgyr22xgk5rwrzR6tqKHmuaF/J
SQ3oWIk3fSV7/nM39gsg3laqnMMmpOayReSxeYlLlFBRgMHre/NX8OdDPsZIK7EgkvEB3+eilf1xZHG8

NiExURtNOvRffjTRkOZjinnwd1xC6LYr7INiU8ujqUYZBCte0tJsfM2BRJtQ3C99t5KgbQJxr1c7K1w3

Jqyuf0chlt0+EUUyGJ/cFVdoZEtNrMEVBFkQM43yqq
7OWHnebi/b0DRVmsmU9B0bo312Wo5sV2L7cod2HU06LR3DKgIw06T4cqMDQGaBco4T9XRGgXPnszjQZe

TdvVe5rMqoCvbYfjybHzyAGn17swHPA0JIhUYajyetT1HWUGKPg4HvZ2gPQfj04eY5gUakS3mQ1vhL/P

CMvlI9aVK4Lxa5NgpI1Kmzv0sr2QA+odqQnNh1B8X8
aEPF5+KVU7ActQDAI0nai9mLZ02mXLCjReu+Fu34V4juP/KuV9h4MbB3Ga3J03ndUzH03qiW0tTHjy+H

rB6t6EdMvlnhRSi7pYEP/AqMi4r/cY43gS+4Hszt0GaNP/oR00c8T8frl6mgyY/NRxuALfDYcxeYSRMZ

VI354XQWOx5ifdtSwkl3fWr0OKXXdP0nreIf0loxww
iBTa/3Bp/Y1BnDteKJW4OcRLhoLz1NZUZUWNUV0KrI+YX1pmwCW6a09eIrevGoKXyMEoPoe+W+C0jq+V

2TBLyH4Chza2se82vZTOHDBWA3nzfZpaL2QnP9DE/QSC9xlhx/OLq1afx9jaTrrQVJiwTAPKzhDZwXV5

Y6r/1bQTM47fY2KdktjKeXlsysBrPBZTnLcTJBSvMv
vD47h1S/1GyAQ2/khquqDqpXYB/23mNHiDUm3uxom6bMd/hBpOVKzq808nIpjhXW9zd3jS11XfOMt/sa

/sgoxNmD5F27QZg7ufsFU6jVOx2O0QbIL5OoenNuTjLvXvS+L67pRhuEVte7hRbWH2SSNv5B5b6jOEfG

NvXpzLGXdVqzc7nNYGIx3gCNSjz0crBucZGTVOCy/O
2i7lBsD+efkzfnw/64d+5i4zssj6wcKU7GNI9g0SicfRilsHLfIFhcQ1cffwdOB5zl7DrVcBuy4RIMaB

6upzTr/SoPvjtBN+z3aUmtWjUZCxaY1mEKzxtOswY1RQ6v0B2KzsetNDIjjmqRuQNeQsLdYO8xJjWaJF

oQS7yuSosXUq75n///ccXWuf9LHw+B6gTiOyONg2AA
WEzt3w7+13wkPUBA7dNlw3+Liey+pxh/ukrwJg3IR7fjRDR6EBx3fOUpT80Vnp34HgDtYycgNX9WqNYU

MnYC2e2iWYskfwXhTtHBZFVaBBCzu+k97dRLT+7wkheyOkguSaBqW9Cp0Z61eSybV+YSn6i8q0C8gh91

f72Nq18nrFPOAmqwmikOy3befDPpcqOv2t2w14oSCp
GFToLl6u5IIaPpRvlancUdy1SvHD5doJD2D34+Oeo9bJ2+uyFwIe7yap4XIxbx9N1uVpEWkngKhcAfUd

hxqsvS7XGdnTu/8LdxSZPc0WoHtLa0L99ejVD4cupA2N502P+knQgwvwW4m+sgwXzuBB4mMCEeKU4Ooh

iWKuclVWtyLixXLWzLXFODz+PAl4qB4m4gdhyigf8b
euXFrqnQSt0VmaHVQ12s5/1BS1Mok8GriAHdCES70/8qfYLe7YaGXlJ7C1rRX1hzlm8+wHzvZSj7aU46

ek/1SRqfGdtW5YjovgmctrCTnTC42QgvU7+6AiYYzUdHroqGj7603X4Xsz8CcgWYnsc5LQ7g74DBjKXH

eS9TB0a2ucVMW1kmTmzyW4gCSHcbAtn59uLI3CTXdr
KWu8JC9SEg6mHZdeurZmsI1rffzYh09QcCHd/FdgxT6GvT55dRFAOdAnlJGz6C3OvCnOW0usYyvpJnt9

I1FJ10HOPZLYpnhskcHBADR+yPiJcHt6AOfS901b/D0daaHTbxC5wHKtldhlu3RSXCGZNh6r+ihPHyHy

e/WggtZBWEjb830YLsZuF1KJJidtWAuTMQS7vwDUH/
Vj/vVT4nx+QVawl5/pJ81rewe4sw3w0Hv45fLRB7hP3afJdYR2IaDj/1MlnSYD31+wWY0agIA2i228ne

BYoVb0fV/yg4KcZb7ClhuJcz/L6Dr9qb4JZ/NnvRHdr7Wd5G4JEZkx9b6Ch55d+g3e7m5ro6RO3Mvtw5

34Er7CZTpqg3MpAWeeE/tWzxS+BpenNzmCbSjFuW8t
ZJt7xATWIjWtwOESicErTy+so/uSMNIVA/U7XYz6zW0Q94MerOsq0DOyT41wKvuTDWByoJoIrcxZrjf3

z2/EiqGj7LjdUJxh63dcrV6zjCWapcbv97P4BLpo5abpFxXy38VXr8gzaYWFtYZUnVwJs2YyKg1iyJNe

diNGTfeEgQK/u4o6o5vGbuu0oawolhTIeH2I80Lm5X
tP037Kg78iigulX4UHWnC+YZr9T5K8g+buv08mFKzSsP5+UCJDZPpYuVFElQz5E73qQIikRahfAquBSR

E2DSX+zlhM4v3w6wh26iSDdPhMRuBUezV69Sf6QGE0XFQS9CKfDl2pZWMB04DOXSiBg7YKMZRHcpXVoC

9OGzh6zxt9F/ZzHhISuUw7LEr0BV1m1sc8nR5NksXn
WnkvNRUu6/HRqFWppIVywMVIvlkcFLRrTxdZ6XDbMV7DsgsQKVGQbEYNaf9xR8jOV/pCBEuFfe5z0Eyv

yI0YrPFAAwfIJ2/kOhDb6E46mADY3AJrIwM3aA8bHaGyOmit0gPF3bJMsQIlzY74VI2yaNNWgOOypYCA

XtbS/97c977O47CthiPSQ4iUfOQrHFlkQ55HAh6WaW
cAMcHIrq64wmfFak16fijYhaq2fGhQAHZm2Y50khxM06EoyCWXysYQApsfP7pzD5wYrOAA53UrV0SLlh

uW4r4kckUvJl7zChVvJL0rqFxcrVCzI3SncbIxjfNacxbQRUmQZyJWCAdMmNUdWDA0A3FKiiHsGh0111

dSj7Abwz3Auqf+XVX5N/Wqz/AC0Z0/I4LhFxcyLIV5
ukabqi+QLF5bS2UpAEBKay6ioJEn8SGv+EBK27qplrGZC1uErtKoL/kNC31C6YrWpi9i1pi77GqXBoHU

JbnHp0zmPDEjRAA/AwJjl3BnV+tJjb9i58wB9hivA9/7lJe/7Zj04Hfh/1uabtPju7i/yof488B+HKi1

ilz8sEMefSOwvhfpslLsjuS04twpT87W2qCC9qo1Hw
3DcPgDAbecwbCILmRvcKsvAA+Umw0Y0g1fznCrnKc8RCmwXinLG3cbi9ld8GESg/Q4Pjd6Yf6xQwOxSG

aq10TVZumdxXh3Pj26vpVIJbJMnq2C/IwHVGPcRI1kLcpaB7TRsHq/xJyJ1VqCTJuuvmAnzAkQONaGyC

ig/a5qU/Pm2623eik68C3h5bJoOLRlNoAmYB74/sW8
bOLeoZjHQprAU4jWApEuKEKwCdAffLqEvScIn1HGJJ5FNPhjWNd9srx4Y+Cw6LR7BHhoueRTdJ6KszFG

uG500GU2yW12WY50s2RAFye81etbgePU6E552h3Lc3kM0voaY5LUmRsi3LRAQ/NeiKRB4ht3JlzblPZ9

7Jv/hmrOV2+VbDEPGW7+ZudWVvr7/H+945FA680m07
UGiP9Tp29f+SvwHTgBUKu4nc4hzAd7FoZtpczYHkXA4WHlNSGmIzp1kIQ9hUbccesGzhXkenkyR5Sn/D

W46nO8yx0FneCfaWiISWH+gpIwqAm+w+V+w5+4XHEFMpc9uAbevfaiuhpuXEFiY0kA2qmChqpmpfHiSp

58mTK5jLd20t78P8WWczc9qeLvW+QRQ4YGlJWmqKsW
phuohTe+p+xZW+AZ6XgT8zuClqGZgZn5S88tcPFdfKPwdtkel5bcDsypxUkJn67kusrvz6YvxgUZcxf+

5snXJ+i+5wikRTsU9J8oPMiyRpkKuUsMW15WutzE/GvfTgOfV881m9ZjtsIWxojwP6REviACATBe0Y3h

Tx1+uxK6K08fx8GvHDKg3E88QQd/1nEwUjY7hPk6MC
T7q+zGvSzxWvjItimmh7OxkN11KpGIetbXxdAS043EEmRHzqhrWO/Uin1if6eVDSBbCl1eXTajf9WcaQ

z7o+xPLJf8WaP9fqm78mIt6Uhstsgn99klJJ2Pv8x/UCOC1hnKs9ZQ6fGwf5t7DZXzg+GVBltAUYlBmQ

FXXgufi4G1PUoonx7mYfKv1GGy2djLW9N+35IvkS8d
Uxg8ie4R5urqbMVxgPhfjIaV+T9Bv2noJ0NUdaHr7fBzvS389K3WLME5baIndy0PYYubfhF6gWbt9UA5

X0pkhxA9GtcvDqTtMVJKNqgleeI1UC2C1GIDCAZDypaNrORCwZjNBbdI9QLLTdE+UoaXmdIPk44BjA64

seYAq2zyMc/3yXAXaeCs9IM/oHv/5GnvDWfnxjwmmj
PtSwNtvFKU+1pzgMKKMDXszY6uBGM+ieKcFvpPkYBbVegoJEw4NJXSuk1HagYAdiHcpZfGy+ufftXpTX

kTAosLl5k/5K6NL+NB35tI26vx3zAz9ZLnlNdFiVucFsW8zwk2agDszCPBDRhYPZMiugJTWj897l7fcU

ZwQwOSROyaMMSKsjx143w3s29hnoC4QFhktPViJ4e5
RNaIwI98WZ+gMJmZ+98l4gbz75i5vsj5LYIv1dQ7kkjAENSX0Ry+d54srBSU1XMSzC/EyezCMeuNSVhH

Mh5AC4mDb4vPFnl/3+3l3OFPutkVF3+E5SWKxv3CETcVxZv3l+3QvdT/08izZo0xHVydjLcCkt+flePY

ATI4Ycn/oAMEN/Sx/tzz2Kl5L7YFcCD64Bgi/9x1pc
KMLutRzD0FpAXIA8KTiIEj3pop37cnfv5+9z4gLg8ohJWapuRJnGnBpZQYC5yQtgezSM+ros2KUXRJS5

WftZlA9E2jNzacE7kgUdOMP38bhRVQUvY25/i7gmUuYCDW1eQV9szoGDTA7mCzD/vlCMMiOniTe0w3H2

Mc4aQCc0UTRkhTsQKdVqqYMt9js6jg9jnY04zwyymA
l2KECJtO8OaOdqSIqfQA7Q1tBC21YP9GGCxEuPl26gfFmwNcN1r84nO1hc3etBGIeUML8XYGMCn/G6cd

l2j6qM1/v3x7gNrLQULX1iaDTU29wDfvmpMFwsDgI6Ui0jUQwSy/2k29NSUJgXjeBle9kdA7+rRaSEtl

+hgr1dCSvVOlqmAZOt6Nxx/9w1wzunArRvOZrFfdPJ
MWmwS8ZtvQq5Nq6QMkCfhfRRntNu1KvuIjfv6OtcP38UUPxEBCHus5dy2O6Aearien/7GDUflRMTOZ7x

cuBQ9uKX+kHM1uoyjkYvy+BMr3efHyzyP7wzaEstOn9MVeM7dj30lsZwGWrOdDy317BusXDorjNDohhh

M2mzF2gEeevdp6SjpBciczPuWxswGBEmYNiXwBwKca
pdvMYw21mxGBnvuMSZoIkr2l3XvtgC6Oa0OcuO3tjQUvxxeyy8aOpf8jnqs+IDXN7KsynlYZDfEGvOKW

bUqkW6U+oq8Yg5VSp/e5ikpjFia9Nu6XCsJRFnFv0jROLfcBUm4i+66CUBpuDV4qt5a84degBPWArMRE

nh7T90lfO904neUxfbYarUlFmwBJQQh7RzuEKyLCla
jZZN3/QJ3/5rAMGR/0vL8C5PRf9fmq5tGfh6wTbMLFIVkjk0wjijmI3PvpZcUk4uF3EkMMwpJaU6WpSn

0LoY25DH7Mx6rg2UcT1rzbd15MisN/rX99EV8lOy+jzAd9IAQ+vRkdotKAoTzXLhEuXPgtQDBKToUKeI

F+nGh+V+taooye2N8Hf92vjtrLV4QNHPFGUBPaYDnS
MwYmKq7xpY1NN+Yzt1XkEb2GYUII/5NDf5KT62XY/4IqlCJYGJUEWgR00tGXyGn1+IZeGX7R++n+xqsp

EZYfdsLWsRHwKg/fwNHHIIbKKEPcKkPRxW0NNtsC4sXxcE1nKt8tXgZlp2CKkozBxiVY+abOBtkgVvEJ

1v72UOT2Bc0kimi0Y5l2EgMojQQ72yPK97+j8JtuWH
IX+20Q0y3uHZ7NSwa/wPBM96+lrGmX9r4Cn9toRVD9UWx7OdjYBrDp5zSaay8dt053yMNg1Nmk1dXqMH

cJ9sayayh9l9CzKesA3UwugS1J2PLon4/JoXB6fTpDrwAsS2jjuS3d8Cid1q+x9RKy5iuXjPLAhHEtrn

94k2AtoAVboOkigXW2fmFL9NmT455ogRdzMyAnu56E
7A+deHs/IOFsMlM3/SGFMWFw2qfRG4GI9nh7F5hVR6734p6mVzYW9uE2twitXTmEptoun800UTwmzZ1A

bwaDecThcqj1opJGJJmw9676aWI/0H6kcIb1W6tS3ivoP8CeUMy8dzGKSyqdQhx/IpR6hbs5kl3JzDA8

PuFEhY5ebfSNCmq/gIFyxQDlVryORcP9/gxN4Ei4Wm
9vTWUl7ZExB0djI9UQcKpI51dTGWnEIGwaEjbm5J7ozCtmJC2R499RF5P69DF02BzvC7ihHb26hFtlk8

X9H0wpv5ql2hvDTF8EaAqv+TvUwYPehoML5nO8Iy9Y4jeeCzJZ0EohaIYzOKPeGazVRJ6eSOAqO0+y2z

2x9UC3gaQrWrnvrPwGnzfkHL08J6jda/5YDUehbdoS
rs+ufd3Fy4Sd7eFvk2EsmvySnJscQtJ1we40V9GMyhVsFq1sZvKp50hdEVnik9ZH/pzio3LL1+eYtiSo

je0LDt+ms2ROCv21hvGXgwWK6M0jFyq5lBS2kV3IH97ShQZt6gaeBEdyPvwcOptWXbGbbBsw8XvLTTq+

rYYvvLFtyL+0UJYRHhIUKlCTlO3EGORB018eF3xUN7
kAssdbHRk+SbVXia0zU17DlXJeSnNesOqzhqMvO1/XkZgp2pWzac7KzajvImKnz3iVO05hoqgy+PKp20

c2EfANaxEzJJ3t1Bxe8nyCmTX8tZegllxKTjAhzbgluzoKIceD0JXmBEQyGtjjGHuaB+Rty1hig8zPRR

C996YziQL7yn5JhutcnUVwjj/JRn7eZcy8t+VIRAL/g8
x3j1RT4Blp+FtYZWBhf/C1QNiBPqWaTyh6TTRwGOnP9pE2M0myCR4wbkq/V47gV2xCilJ7pF5mgDWHLm

NlsyezA5m2I/hOSPp9FF+or2FJznkFJdV5dDmt31ATGaAgbDG7lcdAqgbNQlBAyrgUDkAM9TaUfRfHF9

zukJIWL73niYwGW7+mBjdwPToswoi66SmjtISGHZ09
mwN6JkoPVu9tn+Ha2SCmUIfqCoY+lZ5TZwNGb64+p01KQLZTN2+Fmwz+zan1Wdx5hpI3MX83qjzKpP+S

CMp/n2vNUSpUk/KxuhWRGyHCI4+3S9/ekGJLXciVS+o9YOxxzbs3P6wyFiasW3sayu5FaOMyR7sKaC41

qYYdV2fL5veUaEE1W/unNkhSInUXe/yFoUFL20RFsS
XRjMLgW+qrIU9tgeKqX7hsX8JGghtCcHkWkba0IH5627uQfREdn5IafO//WTjAdYfGmOej0UwKtPvdZ8

YMwDoi2DR3f1ZiHlO0Mnz3PnvYQeompWNFN/VdMg+CWHiUxgQn0rzNTmBJTNyIrw1x7nPgoLnB6QLHBO

bU5vNLR/wqBtmUUEAF06IC6SjuCk+Lv+wCn9tH6Ilx
m+YrjSWKZvbElf4QD7srmoqUtkhHanSdwjW/V5MooUAqomqlI3oR8h31BYbVeVNJ+CO8/3D1+0COi7GK

axH6xVQGhwNOcVoAm9cpt5N7F+6tA11F6JEyqirDsgqqW24IsvgsD0W91bu3HV9yuH3TLhKxl4HDhmYi

J6fdQZmhb53oGkZuLjQ2fKjxAUp13Pj/nsc0ZVnxYc
S1c/f9lGVq7u4n5f3JJAe+y1gfQCgbeaHgoxJXPp2CovG5wEIF9qWSUyDKGlKUR7kOI2wjKuQJyMPpNp

iyv/XfS7lrNOoenr63dLshXv97WepP2x8fxPtsjfYEYOCxOF2ytcfL/WXcz7oeRXM42FCssFxWDcx5ir

g2xBAuq/29dr0OqDJpJn3aNjndVRa44fIBwj1cuVtd
4h2MKT9aAaaOP1L7IpUGtE5Uu1fZ7891XD0Npn3bgekqZ9V41aPUsrCJPPYSG7G7bqFXciVM+gQULpqZ

hysJUwySQgM3TbdeqX600Y+BCSGMLSGDmqGEd0820zv3V45anB20x2GI1D6tQymzLfxv6dZhMB6Y0NUj

bSJ3tonvu8Bmhe0RuMVIFVgmWAM8Mx/dkAvUWtgnx8
EbmTwNJsC8gzy3H/gy8T6iwKC4F4b+N3SVWBKtwD3LWc2ophwXkvdO0YJpvP9q3WsnznSAQG2bl+eM9X

R+4OY04Yw4d3aIJD+kXx+wsjKN2vvYNhLOIyXKOSjK/fUI7c9GrsrausT9IV1/mF4CdV7aGqvIoFyBrk

jKyiZa1hM/0DP5gpFYNbHSEn2xaqN23H30ay5IRgNJ
kUyL3Lj6umI9nrQ/9PzC3chSkc9zqlwrt7odKS7muZdh1GU+BN4sMOuZAtj3cwBxWdEAvS4fnvL/hy6x

N0lVPq0WFhCb3uJ694icCiM8pbayknwYRg0Kwvkw8o11RYePAewOOOUJ7ERxsLzEpFYXiUem7pOqqVb1

DYFCqMBC+fN4UeTmMmld1KcTGz//1wsEAJssx+cONO
YhpX9OMl3844VDS47tldt3Va7CpsuTfw1pOxFcYlVgfEyRaTeZinv5Xlqrkgp3ZqY/W5TdhhZeMvt1m4

uwcTTtcyXjxALitaL8THa+L2ViLDT1TylE0ZP2qdosW1MbuQ6ae+9Tq7To5TYQbClhiQ+XjM2IFuG36f

1GxfHAmVu1ijxI1izNBB1EiSh3JSG2EnAF3yJkGmfK
NOEH6k9iqshSd8Kmj23/3or/5zZnv6a++s1p22274qcM0faMcHipmgMP7UiHXUoEa7wssJvIdcO+pwda

LAn8phlBy6lWYvkblrKQgd7UbPp/aKQ9pM8mqwuXgAMHdksXrW82gYsk4JOwHOyNQV0UCBZ8b00ZY/8s

WHAVhvYXCCTIWZVKPB8D0vzd+Y7PTTCz8+qB5p1mkP
4eh/HKNp1W1xeGcs+LZ6Smwwyw++KcvSDoA19z3ZXMqOrqPbJDQp/x0n6m5SpSbVvPoF/68yFyyezKnD

hDDEn6t0Zh4rZ6V2Dzg4pdFZ3Ke8zqF4Q80OyV0bnTIU9bJQo0A7XQ3AS59XM7YSEomO7GzjG9XiaaqN

YfjBGtaRGs6Wvgc2+g2po3hR+0zVdeEPcU99BlLFUg
uc0D3usV0UumPMVW7DYWa98UgZuccas0ZMf35bUW846Uu1NfroA6PHF2Bbz4UBbKi4t/cYYF+dPgMSwv

l0Es88QbMsbNlYzIzgc1bGqqS8BvN/PxT3wSw59lSeYgAeJOLjwHHN7HdJ4zIi+fFnjq/PsEoZhFOtsM

dfMUaVA0DuA3CqXbdUCelChYrVHHNzte8Y5+NJHNpY
gpdnQW7tHMOjbSC5k893U1qjwDRJuE7/AFzh4hwrf9ohWNmn9xbbczqzMQCSAQcLcXJvjKPMoHLgzoXa

F0iirSgGNrHG4bQgtc8NcyUafX1FG10bOtmUq1CgwHwdne2W5xLTpf0Mea4kug1O2ClWUJWPaTa/L1vz

E3B5003b3ntw93YmMNWu3q0lnyQYTvEV699uIcZln8
XRvwPwCI/KE8BsCxZOjgJqSYhi07MAIPXDt9DeXUkzJZq31KidIj7jeo7QA/1QOATSDyAfCXyeUBUDDb

BMnXoifa2yKZLzI2A1es1vdteIzPQNY/+1CNO8V04OZE2KQAsuEqZxGzDRA/qTHoceQLjbufJFKwUckC

4+Ks4lHi21W9HeOXTCaPCGzuialcaDeC/wVEUR6U+o
xpWWwTeaHt7g9imdO8SfSoai3uS+Y6locce0OmMi3yHjnMCd/OuzVA2DyDKK/XuVCMzAu8UxZM/66wf/

qOk/tSXyHvt96gfFKGD8r2uXRHe/5VEsjTsRIHeq+cerYmyKBUVboUxK07EtVw3d0uSEBldZblB0Vk70

wjM+VUm+WK8MMwjH4cQLief1+gQD3XKy5lIOqhS4WA
lGgh47c4/GPNa0YDbF4tGPPF21x3YzzMa5Z5pWU481e6Jh+ycA5958oBNoap76igyojuDhfyA2C1m4Gl

P7TVrgAw6ajzK5TkSa7n2ojEJuznS5cLw89cextygMTzW7TG6V0QzRWvDowWWqD+kBYe78u36v0feSri

vKpJT+Vx40oStFjqZRT9+CWMARr67BzFt0fN3UrT5E
13kBVctfN08IkFfURWehSaLJwjxYxn7jdcAgZk3oQmw5b+Ti11+1nxBvijR1hozBizy6HTVVkY47q0Pj

jHXtioQqdXo4DaS9qu9e+NJ7berdU4XgMlSux3GcpW5UIbyIrctpPIXAtvg5huWOpKeH8urkpivLYblX

K45IFyEaP6kQ+QBnc/viBmB7dr8W+dU48qA359OdAJ
S9uNvhoX+FSYuG6eGKlicj1J83IyizE5+qTcRWUCz1vkgaES7L0ws8+dh99DM3mBCYIBaBsKHlidWkU2

Arghozn7NxLFszrSBE/EulBklpK/rWf1AU9Jzk3xmK4HRp/4mXt+pBSqv7aIQzeXQMf8NLxqbaOhTfx5

mGyoqZtbYqzd1+vOAnsLEfTqDkxnYqg7sN8laXlHnW
IHQkrC0DaK2dd/CGJwhoILcOre8XdDydjMkjD04iL7fegGi8NIkrarphO94nZ9kxkB/c9wKPln89E/ld

WZfKeYQuFZTwE80mKhIdWmXN/NrP7JQm06tCEQ7c+6ojGTWH5ORhnnGt1h7Fnt/FMyinxnibLxmYvfBO

PUW+ks/KdytDHu3we4kNrEhs1AxmPyzbpZ5LW/BTyW
xbvRTkQb6zpT0OGbts2xcVZuaDK6+A7++eGgzKvdT5S6gYR+KEMyzKn9ny6aCmldX3ppIYxBtJusavmH

JvlPznarMKD0Zcj67fNAd18eT4HIpPFi/HHr9bVPMX+GUlwPSLtbAcGh62fKIkqw7WpQmGpMBX1tGRuC

j9ZDYYc0hUkDDRnzAHh5AuU0jVCqP175sDbqtZ5g56
75Hb9hTacrsrunLVNQdbk2AgXesVBg1GRhgEhU133WnRy2aaAZdzAcwjZ2T+6/A8yzyITadLdRaxDfqN

NfPrJUrRa4pIyGlcHhQGH/jIYeANC1+ULl4LUNEW2SUJyMJkWArVz76FqCfTa+SIcUZwn7muw25MhRc+

DgpGRnfPskBy371/2lWO2Rd5z2UxfNX6TEkewIfMtT
7Ioe6HWuXZKid8GokBMwJCPw2IOvWe8qauDyqd7jzFsQEZb0pxaDR7dlF9HCT1i7h5i/KiAr7gdHwoAE

1OveUq36/TaHh9R8oClaGfxMbV/ucR5xAY7prze3+EWzuscceZZ8w0XYmi/Nhv/Vf+e1VHQnuMxc5fFd

NJFAHwRz3bHYxHIk+gpzHbJebNY7FNFlvago+DJwZD
v7pAuR2fO1stxPLdDKFr1TXf7vkYAoQ6Z+5piuJsajIMOj7owdLyn9FQyCiM+nrZJsw4QnDTWkETKQiU

cd55Tyc8m7nwlGUatBzJiZzjBoRs7g0rlyTZqfP/uJNu+NPN4LK4FRSSMbM71mf31mVbHX5ioc8MWiHO

7ceRzpk63Xp7E49pZcB9lHTAh29EO8KN2tokwbjq2B
FLspFcihT96/OZJyXKXmVCr/Derrick/tROVGaopLfMRr64MItWokRz6Qw2ViE9s5qBUJ6/ZcrNJKH1aQLXO

Zh8M7gd96zQzFSiCmjA7xv76eVFFarJDQJRaUGANorIzUJ6CLeC0zlOQnCTojjyVy51ec/XWjvQ2Uixx

Hx1mN4yEnbOCoNGkzpAbX+iw5fGbJ3k1cQ2qxPS9P1
0/pLW3MPj/o0GFJ2Cnf1L6onsZklutYTvlHtH/qNzFAVhgOyQ//oYCZAHOx0WNdoqxCpuZqyt8lz4FBr

YicEl1BunHNC+ISvMkb/z1NPjj8r448ETgiQh5x3Ebq5hc4ZjpvF+yRo/N3nX3WrYBpGExPbXkV505Qh

YvKXnTNKnpvYK19tj/1QNO5dyUM8vH/jF+aThp+NpH
a8Zwy6wkwsn+YbC7YObCtKKbvTXw3TUVcI+1k0Wkp6LT7026derNRgiOhVpTTsCMW7Mmfb23u8ht9IlN

0+zvIibB6oE5PYkMPiif+nxZSe3zYZcmSFXD6KcsDrawfzm6QIwRpWQ0gdR5OeyT8heI+zzmtZKkUaed

vFioe0D25/rNr8trA6jxbynrwsRFULuy0CkeTE2Chz
a0RT/c9PT1dn9C98yHCVLiGGwyUw3LR8fQ/4h4+uhg5G0mbUdahnaDcuxRcLv0bXbeVAmcvSZuv8LWVM

00KsoIqQJl0Mxg/AClmg4lxsOcSGxXUynsxHVGV+AYoMn1TJc13PTqAejPYzAuZgMhH7FLeSSGSXGPMC

0bU4Lr61ukKk2PhL3p3/T6l83pU3lRkHiJvHVW2PIw
0P/Na4WmVceYr0hr8Q2DxuZnZbzihwmyXBooJv1nH6i2M3Im3qD56KcfhuZT3aarYdY2Wpb9q8XgoL7D

lAx4OEpeek5jZq5nLmF41oILVsgc9+7LauPSNb1lmy09x4J40TfNI49y5nlIGSiJVgvSiJ5LdCqvGxIa

9bLDpfvvbZnJvHjdILECEirwHch+wjo+lHg9M4QI7C
5tFCHrnCSLQhDXO8N3Y6fH0xGDv6u9mQ9r/kl9ZE77bQtzt8BvB84CnNT8Nm15j5O7hR/izbH0B3NxuG

WqmFn10PhP6scgIa2WYp1W6aTblt+E9q/CRg498CSP1APJ+mgoNI+34nq3+V/h+Dt6EYp7RNt9vi/zzY

r9T3Y3zY+47cDVKIF768cdy//58yH2rT9IWx5GOGee
/qhMIomzKrhYcbo9W4V+gohzh9Mj9HOzxxioI+JwzIlq+3LiurbsCEEeIEkUBFTg6wo++t80e2RCxG5+

KFDDDFOxEASm6ssf3gU5sBmWIgLBBr9IJqdqeKh67k35uMaUmnCXF1SY1FAXV7xy3vC6VtusGNgjWrNV

K/xrpNw0XrAKi5+F5fRAmLAbmAwWA2sA4n4+CR66/f
Y63JzOHx5SwaRQDDDzIWLaoXgWS7RhutAqCcMP8fJlnYHdaTl991UlfBsR9TxsvtFXcNNIfE6+Gl1S9x

NpjP+CxS7Ry5NRBG+wyI3sZIQO91Xv/zGE4/14rRZrxn78zqW2JkOEcnaHVdLc/B2mX1iwLReSbzj1PE

W4wrzJY+tRU6qVcU1S/0QMXqOfHTzdCGM1dprMVNyM
1UVuWGrmHhEr945melylbbceC281/Ax70x+9WNeyhsNFwlymAloZ3Bi8ru86by8FjYwo47bjJ2dYkpd+

icXF+d42J+a0jD8zo/td3pTkRHS/OjOA4Iuvpr9Q91yyWbK1sTUxz/XiLWtF2hfRAzLtJi28T0EacGkr

gAhF/3JapWDMknPabqKUyAqnGVMAzXr4lOfHn1PF2O
BdoQfrjXXPYnfqc3/dxUDOL9C5xl83TCs4AY9jcXYs53Ripf+RJtEboOhCVlHlQeuJUrunkytc4msWPp

O3vKPtt2KZ9hhOtf6o/9LuhR+mvSgZqMGmvq6UrFvsUBS4IJp2bQztujSxc93WBX2lEfzlldPixL6nNK

Fq42nqGRKj0tZh7djg9d2q8Hq/sd4rgUazW4p/XYUR
4u1bvFEP71seQm6HrZ2yd5fwuMLdrDiPK/rjNp2dC2dg6tQAx9JTgZwECuG7YIA+xOdhAwY28c6wBEQr

V3EAnlXcOBpvlQSIqUPYxhM5DqqO5WLsmYYFNU8gqeXJjl+TU1aXFD+AvimMQU+LgMGw6Bcnb4fw0s6L

TxbRDYkWwkgskATtWd/QOLYx/BuGVbnczdnCYI+jvP
igpJCc7VUT8AExJ0fb6XbIGDZZ5iSxBsqyqXmoVoJlIdcf9FeusK8n0didVogu8vK1DVKBtSwwULob/G

yMARKimV66OjFGbIleTd/5tE+LEQDxCmvSwyYitD0I7HM0uWchcsi4Ia0tk3VUoG+Tu2O7T4TUBBzQ5d

G7dprEKVllGqLMVRokWdYbVuJ0MFEVRRycOimG52MF
5f07u8e2NKADbUNQ88Lmst0RZcl9Zgc47G7FmOH93ZsMrm7pBOm6A2artmDu2yWcfQDw6/jUwz6P3WUv

EfCIdOUAdWt48qgdujiosrfG2dNWGyNU/ZbNkH8mvNuGdRaTKhg58vGBlbHLKIdyqaasEli9esNRH/5o

jM50/Xe7SIVypfwm4VOLKJOKRvA15aP04wtrmaBifz
vF5d8mi86nmtqMny8v2bzLgvCII38R+1FrhYtQtubgljUnkErQuUjLuM5BoBBW2AkbnWNDvyBCbzHwhO

3moHpBDACMB6m9x9qllhBSJpJpN+loJj/muiDIw4tvDGQWj4AQy7+7CJNk41wKS6fnsS3ViviggjFK07

SvSwoHsUlBRynnTSkqwqyZmL1SBQ1slSrPhUd2ixbs
0oL0lBUbJ3cdhvqWisBU0PI7FnwwEwCYHg/KaAqyg1MtXyq7oxQKJ5V7cleqT9n/W0jVXMAFeWL5SY84

7llYamFZjFrqrnxQ1iltVImMpDhRS4m6pMoycJPqjE2FQwH9bm3hVb2ovnuIA+Mn4EDbSW8+yCVRJjU2

DE8TcuOxAIXnacnwNbr6B+EJu3+DtsXtLP00owcvAe
/FpEMw4gKCF06+WIYmslH66euTPo/4D94A5veLVrLYtIzQ3lkj6V3KY1JdEZPnSWkT4Llcz0TROOGSMO

61bxFjjF7Tr7iCd1RaAZndU53Jz0I6qowd5/AhFKkn8T67jOLalomOz8Bl06OGeoRSlB0ycpnk7piLYK

2pCvUrTiEshTew1PH3j2f4PcXxRBsNFC+jvqXRkGA9
d5JRoAoeNT0Bwj9OhY1gsAxW+X/uFS8qroHRFWM3H6VPylmalc9FinY1l2DU/OJU2P4ClBWH9pe0Yoz9

i/M2C2o95+XlF+K3fJEJtUWdcazAjOa+uo7H30LF3N4EjYSjc/iJjcxNWH7t54fxbXAj98lvsnQVYriM

T7uuKXJAQDuT4dfxyVnOcejhTlQTXZOqsLYK20aftt
kEuNZ+1+dItC3XXHRBuLEZL7+U6BuLEPnat3xlUlIZ7YhTaX7++ML/BRLwnO5Dj8yN7swtuvi/yjlq8a

zFotg6n/Eozf7WcaITVtN4yWg23JYfp6Cdg9hx1pg+FxLhOovlmO3Neu9rzfVCA7NAXfwl/IV+zFNKPh

kjMMxlv/NHUI5hcSZ4RnRBCSHo+jWCcTcN8vgARbtG
39rItLqqERqKYtKQtHOYMdYChZaeyAEuO4JdzkAN54hN7tPPUwl8fMlE3PfzGH9Kgee8PyUitAR2Tpbg

+lWe9jhVWAo95lC3iFOdw/X+1/qevfZVDaV8YGbSxBIvdwR2ycIgqDawiS551xAtvxmewZIoluMSB1Uk

aimi1YYUs8vqUPSiCxh8D6frmz/YWHpVOWlI42N0tV
A2GcUziHU2tkcb3k+tBObcibLaEGsbkmbN6qs/PiZcJHza+WYvpDD6reOfsO2TTiIv34OqCIumto5N+O

hDnP7keG5v7zZRGm9GBp7LdIIhXGf64dtmOapfN509MAiNuAb1tkprdRIB3WQiy52bsXyJUsY4JKXBSv

lAK1edy+96dRy8TlDZ97HwUwuQ92jQpHCT5+tvGz28
TUIszVdlqABRYIViNptP1gRSp1K0UUiQUEHikQ9LXZ9Qk2PGVKqt7P+Gqqsx9Pc5Jvx7GbQZSANOo3XH

x8gDGvMPI2mpwus2ncdOjD3hJ57EMWd0gEwiQ9iXMDLk84fG7iQ6rp0xqnqx06yn7uf5RB71S2ckF9eI

s+WjbfwPXxxS4B1s0POlaGFwN1KqFYcdfEGuSPYFHp
ivliqTnpekMgtP2ts1XoA/yPDQZZt1V93a8AVdn6qQlNH7MfSpiPZV+tlxE1uxkD5z53eo+a+xcLgE0r

ysuFg99wyWrS//z5jGPnF3BGYMqHRXt5ZkKqcIG7nphRy4HOXNLcGPXVpuiXbIr+8z5GZnm7lYaWabkC

oSHoDnGfjaPEMjsGlLNo5dW88iQJMBvjZhjhT79oJI
j0jl7Vr7Mbso78H0PoZdt+bNOYipxIb0VtoJ35qdGicCh5716A/R2mpxmMpPFNxaRb3nU4hE0bexiCAb

wQ42YhN46EcOuTmXctqp8Ptj7R6c+wXg5eJE5r/qiXzor2p7oEFf6xGEhg6B/MZPldqANitbegCvxPsO

e/Fvf1kMfx1TCRC5G66pa5XhXGhb92KBjCO781Yf2Z
WA/3GDZHd65K+gZOF3IhwBhse5Yeq89oYsNg23wSWcms9dvvtI5pzOaD2zclYspLhWErbQ7IwRjRmyQq

Pp892FgPVOkenyGcJmVp8NRo1Tc7SWeoLEv4KMehTMONwDdx/VPfmfKZKtATr4WNft8R8FwxdLwKJlqR

XO3jZDkgT6qaYj0Y7X1GiP7Ajj7hE4JpYry1pKQklw
lnTWAYaaC0t3xf7MlPiicKavFCxNKyW/wpkNzgCGrqQJMBzVhl918w0fxMr33sQz5oDzQCh88Qwk+eDb

T5RZVNhL7Gp7LCk3ZrsB9XS1bnV8dCZv+pXQ3ZMvKHYpy+M1tpP19gFVdv22V1Ui6cSD9TvZgTYicaar

bY9SA/uNduFh4AcOMBqBG+QbjelbZFnUurCbkJvZdK
H73E4hjEo526XhO2x78ozL455Bd2wbAa/yuxFRyWEqoiQRKpXoHqup/qIB+kNt96s0AZRZZQEWT2uBZW

jDf+X/9bq7AL8hKXLtbeJxbJUxSVezmzG/jXSJ5Xq1ivBAp85ABoaBHBlpaobwemssdnBvKVDMT4qyHH

xPYoIVnNNUTWg+LQojA1/terMp3GHmwT1Hd16v7EQY
QStK5upHm89z7VXoHJe4XNe7mrFUSf3lJsoa97igxgMtmN4welFP33NAhYapM519i12D6tBe345O5/bs

80rT+MBUBJagtOKhOvlx72aqceFku6u3/PdHbO7zRVqOxoxazRVOM81Fx+hCgG/KOeZ6oZK2dDzbdrFd

UobrMEVBO14V8WypbhMFs+8ao39DySvCYj8j3HpIn2
mG/5r0y/GhI2Pq+0FvO19KS18J0XhRm5bZEFH0YeqgXuGghnDMG1U4dlD8muCAO/bwsFomOrpHiikV3H

fZQDy8ca0IhutQTUQ6HFw9rh3SQUiXddEefWnYptUs/L1xsUR/bS0nU64xok8crCN1LyZEU1WyBOac/f

vEcZnPwnzjqdbp5+BVoEW7RcsJNYP66BVxsnsKObUc
AiUC2RTlernl0w6AlY6Kn5vld+SG12GVfHhrDZl7bheDo1K6QFae+D+4Tt3zVKxQtV5jbWScN/MJmAzS

zFVUE2+1T+MQimIgGBqKB3PQXaMkAIQslXG6y8Xm5lQFU3im+YYtNXxonO7pJb7nYA/HQbIdMYgOWkzF

Tp+rri4MVuDTwacWIichHB+23yh6C0Av3+HqZxdX22
H/INwwussF8jwp0edCa1CyVq3WUe5E+1b0F1ju8NHVt+md28xAogRSS3YN75bpOKP9J6SCyQ8IDX56XH

Yfk2Ja+9zcHgquzPOl8z7lPwvsPSa2MBElVNWEsvuuOj90oE94hq6ViWa1ukTWrGvJGUPY6Sbacs+FVw

4xQ4SOWAPcsZ7RCwgDj+xzidmm9IzyG8n/jaZha9xL
5qGFHTI+anT14OQ/gwUkwINVaxE60fwfNMkE21CN501q54HCiCP7ijvAK1uoTYuOpFWbljiICYgS6pKt

2IyyyPNCgLDly0LFtz/GuBmiPd24w3S2d1Fhhh2ShCCrDve4NhHeOmzOsxjfW2EhdwDvvEld6/CTDRlv

qLRPeH+i9piR1jJr5HjcmTDvxxhGgjlYqPW7NQ0hVZ
IwwL2RB6vszEL1aN6rX1QmpG9Ye/rzL1xcv3PdWAkGGVlX5C7b3ytMLAN/WyqJMpUcrg59Af6HMNuLHe

kv8tEiaJcg+q7jCVo9h5wUA/QY3ZNAeNBKK2i4L/sSaYfzl+3p+arLnYpGyut+iPKEEzVMsGJ/JX0SzU

oFud8M6jM8oM6Pu2iv5G7zEcSDMLaFvcj+2cntWURq
NVDoHSnAgLJEyf7t5rXPJPG5kmYFKzQbuiCcD6SzDNiz5fsjzSjo+PRwPpysXAniXx3HA8TSkFViaqrZ

jvl/eBbXC5yvL4z9XLj1nxySGDaG3ivCVdaNqhQ1qVTa/yx1QYwn1R841Urk62s9V8ONopUwC7OaGd9c

7I3Y46to25RTE1aSmx5+azdblCwyxm1yXVZt7nd6to
pyWC3JM+IJHeOeWNq86Fx6bewbyJnmCN5gTpLkDzxN4PJIAmSldP/g0zB2NHQG/vAWd93vcTJzNNq4me

lqQeLDsooxsAnaUwDwM8lMr4xQDVDfHZrh3ZdcBgfT751nw4epFO+6NQ8svCpE963TMq/EEL1bIgaEkT

ZMt0aA17h7KoK9Zw2vE94R8u3WesgC1JrM2Ypx/kgA
k96t8OWDq1KREpNYtyNYaUzTEiCvKtZi8NFJWWZviyRH61/1+UF+THy1TJvDXqG0gVn/m1gLgodM8eje

vlxns0K07uHdqvcywE1dXuet4xVZV9ETk6MeB+MvKnW2BgQ93de7YS6psd6W/AeNwj78k+VMRnSuch1x

jqsaLffvEfgdnM1Hxa/nnyzFr27atX5Ss6Q8+fqzF+
3hUr4fBuJ13pZib3koXkrashobCiZiBQenGCKqtZfaaPAHJnBAeNHFTlhuUPkn3c5rsEP6yqAI6kZxAF

wtHJ2dVymV/4FIqZyaiBIXUytJn7GBSps2VOwsi+L0817iKZUkAsWUr3svMQ8pscgj+9Xqz7UbZudlyj

IkOE0LHIqj28u3ighnhpk0Fl1uV7WThQSuTOD3qC8P
6CRMGbUi66mKxcZoxSZdlHwdX7Y5gLTqvpH9KpHErAWjuDb6DME590N2aB8LieDiSRzbyPF6+GgtV50z

ROPcgcZLY/MUs4gew/1TlZ3H1xFd/fLLHzzoDvbrmY6hXNkyizl015vjW89Zs7wgaC/1iqvJzCnNctpT

gV3Zp2y0eLqIpX733HdMxvDnLrxXrqrJlxIAfb/sKZ
kY43Z2mkzLFE+YpFNhq4fe/zMQ7oq4ANI0z70pf3I8Eje2Rzs3gx9vnOy63AVyeriMHrioHjsyT9l6Ck

vlHT6hSBcwkLkqEuKraqCdtTjyc1UveDpHWm/d4jwJtTCnR/B/S1lB5u84UJzO63ELCkdn0EdPQojhWN

6rjZgAtR4SrZcIJiK2FIpeImpiRyx2O7/BXBXk4YZd
cQAtSE77l+XAlHxsg/2ZpngrqUYkbtzuY7CME0xSAJ8E0eeOZOOevyHw7pQ/0/pVPEWT137To/JDDDcn

Jz20gShDV7h/t4gYhVMoQFd5/Mi3amlHWdDdfPOeZsnNd3sjRX3vnQ02Kx68RX57Fnut4JgSddV7zyAh

xHhLvMWtH4KnBC8ypl821rApqzA70ufwbmBylwoxr6
IiAjwNm6dD+ZRWuc90qdZKpE9LVFZ+axAhu1h7vzS6F7/Q9WtRlromvQyyszhS8AWfagl5DiiDf5y5Z9

+d65BXhg25vpf1NRS7OtyEEeZkCmEHihmUHlLIUqQBHng+z129xI8NFb3Ccbj9oJ63wLlVPYng9scRDJ

n5L94jmHZzRurhwdn9hNCq8EhxwEiUvD3D/g7ftwGw
VCj15O3/4gMAGH/HQ9r/SQampNcUtFAbRILdO6WrSDPM3KCTDojcgdmzL7m+hlqi4c8k5qzFkqGurznR

ag3GEHLuzCY/5BFlOvz6CeVvXCtnzIzCKqZTQi+ujBqE8h8GPcoUgn5ecFaPZDyHs43IO1pbX+FeIBAT

SA4Gg+SLL9moq+mSV//26OhTDsu1HqupOLxtRhvQZf
urJNcptfOc2FpbHIWj4om3ibfOKm0llbnzjr8F240Vw19i7XoblrO024TOlhvYQrS+M5znSKW+MhcflC

CAyxr5qqKI4V00yxQLKNzHydl6sPavwoEx4KQ1iIDZfLupLTrI20grsRdOFefCAhtu09U3ji9fSgToUD

cmg9rE7271+K09bmffMS1PLwHwDGzp3ubt/XlWm6zP
Unpt08WrWKNHTFdsDeANjn5tz96dBGi1xBsoIbW6b4qUHI3Ay/8aRV7f5+y0YZPdXRDi0J7Dao+P8HBR

xK42GLmF18G1zAPYt8G4w1zjzX0oGsQzRrmBIetJKs+nDdE2YFG0PLFVd0YJvYcCObOCGW1fy/E2vtP4

wksgM8SYIACPN5jD5ZjeuCri8Dm8QUpUs2HJZXybPC
+rYAwMtPwSLj5HQMgWonAhPDJ90IeqllTflaeUGZyDEj6Hbkyk408gLo3/RjqzZA8bAdydbEWJgTTPM1

nevaYHRV076y5ovffVlOuOP50PVj4Hvj+qXplxEDyi+yt/P4kWZ59DZGYyLa1kcZV9Xdhh8rwIoRIPJn

JSf2sd3cOP40vLsyEha71Ip4hdhPi18H6Q8H2pfFEz
OZOKjHSsBqDKKzN7RH9WyUF/MJJr1ThTvoiaipSnfGbqUlbBTfVaCJjFsLHDpnTpFiuGyEjtjf2vu1sK

UPAuummpdDt404nWL9cyia1NMbWLIwudYp2hhvKrRllWqiZjtBXC4JEXL1eILOoUUCwzqbpDMagDC/+D

nNu3IOvMGaDc5gcF8hivcSpo5jg2iw7ZAM1G85SPfl
TANnDQ/rTbf8KjmlHNh2xDFAJuDaZCF3skhr/BUtHdvfkff8eMJ0gQPw15SBP9Tawd6f4s5GKNBsrvTz

i2sgnj6iZJzdAyHisU6ez4HiEvjZKIttGeYZK9uZSPiFjVA9mvIJLJvBnDrQsgfOwrd6V1kgUUlLAHxV

M5MIYkNt2P940N2cyHhohKutvg/OGpXNz5u1525Gs6
YNFpGK4NITLQelB01mzLxJ7RaCxqE9My8/KLU/Eml3SvUAT/50yVvQnrmBh6NTMB1kRZc+oQBrHfi+aE

IbxAfVCHgoSZzThXL940zOVHr3lhvOTPdcsjim1tF2wujrF62sBwoPi2b1xtx5xrPr5WeF4b3OPBDbjp

RzUsnehXJTwzuwlA+u9rRDOmGX78TX6OXH1sRzWHKz
Fz7Z10eES88qbCxjFrjCoY+EwEh4MyOaz5kP6DKiz22kS96nD+91Otc8kwAShEa+pcuL5LS7dLGYMFlY

/ODb4MQGDkc69mlndN0MHqWoO6wst9so4BnTMPc84PKK3V1JIWYCLK+EnumpnJFMsEtQCVEpAnbC3UWH

W3bPicGDLjiWUSMUOxahs3spCDKs2Gf+IOZSA2Ljm9
+vXWbQ69t/eMrJsiQZ2nisXfbVuI/mxSLiyTRQkbI371jYjy/+Ph3f/odFiGNtm5nHEcpLuXz9p1NAGr

LGivWJdStFFeNG1kksRG7No2cdkwedBWtt1bSZawepRr6RziSC+926Qx1aiOg09b953M4MYMmQAZnU7C

9zY2lXUiGcCZPamh4FnzT850H48azARqsNqztBVXOz
TuMbU5XDK378A3DCrxJypdlohrNqvah6BAj09gbu+4PPxNjePuGiLoVeblC6oIpO6RpZopZZy8TZ9h63

IZ0alDPTpPoBy82JVTy3rrkQwcBvYV4Op3r4taFfVxUQ6VN03F2wArmXFBzwHh8DXxVn5O7nO21XJQGD

fZdFVN460qrWu1nEz57QiaxanzY7fXXjL0LUaquR3a
vr3C+fh+X2mc3IIhUr99/MC+tHn8+LrFUtgvB2B4azKDRaG4T1CVW7PoqmY3dSrqtdFQD5JfJ5BwHWC/

rpxm2YxpoF1Ijg+lUKOl+O7MnubMm5DkIEYoUDyWwyT/w5JCQpS9Zpn+TriPfkMYhts0LCs5osT7b0d2

m60kIPSAcd+8qsJ/dcQ7UN7CAoDCd2yRWThHxJc0QY
jmDZNaU4tFIdpzilowcx2vjB/iGQ7Hcplp4z/LUZRQ2y2QD9b+fZKAOx+ZkAvD6LP/fZ5zqENEVz7MmF

YupJcDa5Ff2X6HjjxZQ+y6WNQ3W4a7EhegTa+IC3WzgxTozTaaEw6rnl6vUINwsOKEP8nAcqHFhBEJZs

FzF3Sop5yGa7tx0suLiWnNPt3CmlvDQGRzbpcA3Zs/
zK+/EPEGQkvwkltmCuHldAD4frfR5d/rz/v246CXqyoQ0QBalB/lJjI4IDwprfNPnXgijmfwAJkgSOoW

tAo9rslvq/dP73+59phWJf1oZRp+ozt9CKN6fYbZssvQO3VYQ1vPwyzbb7p+D8jbCAijuhkkmdzD+8jI

Cwaovr5NlfpcCNnHkI9lM8Initc2/VR4h6XlyW/Jl4
6GIRA9P+WFT2M5hNZXkWHZIMNpG7iNu4F8tSZZVOorCvKrNzp71nFRVLUhKGyem8PEJpYLldg1U3OrzS

RVunThp+5jhauNBK+knJlyKIb+jaLpJceolfO4rN7jWLbnH3ra7IgN9MJymes+BRpF2b4eqbUgqvHP8S

1hkEKD2/2ojOOKK+meSVfMC0wLMUsvRBmkW44DA/97
nUJqD9xhKvO26CifpYpD5uBxCYJzXSR74k/O2pbhnN8FrB7t0YQ2ljyk2Ib7LGgcC9tfkCXmsQMB2DAS

/1pgk0z7tte5N61b8UR+9t6vb7+UEwkpg3/Y4FgDcyjJobWS+MItvZ7hMrM4cwns69rL8vKAdUvP4FBF

gUJatL1fGJsyQ1oUjL3pBdxyMlLJJBCeY/bICJksGE
kU6lnbIE17xxO232P/8b2wMqdufku3A2k7J9LNgOB/EgAzYTooUvu3YHT+3uKET8Y9SEk7pAmFah1Qb3

MfqAr7LbO0/nVakvYG/h7kieAOmu9ryc5nIbbJx5d+qnefB3Vb1bhLaE27CRUThk/CUC7ZWxYbbWCWys

zD9ek5K/0qrx9xbcrnLNyG9zNV6et0NGQ2InEs3dEn
Sm8IySKMvBQ9v59BhMpWappRpQ25xhq8WRTaGdY8sOvkz2/WvXfmznyZNR/Ms2Wcnepc+3338/6eU/Z5

qX8r9Lz5xrsw8bYWgHmtmGTU2VadicdVz1nMiLnOUqeNxxylsNWLxWV5ryhOBTqiOeSNe4V93X79lG6w

2YMwANpOv1nJCFl19d2V+VGdGNg/zDF1vj9/yH1PF1
OJUC+WkIktelh702QuJ+loTvyvIivQIRogV06yXFKB0HFmUpGdgis4Df/K8Q1GgtllWrJ1V8QwaFkEJh

pUKvI/Pdm9N5G1qWUBiWNXbfSSaUDs/iIUgDov0G2CXPxRNfYWzr7zyCOo26Db6f9FUTNcRkfKaxBszY

ZkYM6SWhYMwrvGAiRDXNAYgYJ7ElcZPAo0MumfS4Gh
IkFrTkGgyeO/QAPsiIXlZHSezwPXtWiZfAeWVzxJ/ZUcjuFjz4/YhFWOaWf+dNFmWeFcL/djxIIkKqzU

PKuGpdmiuqbIM/onZp6G21ohJmjbV/oJCAuI0AboZyLyjsTtZbrfC6LICx0KWXJ56cMyl3L1DGCr7PeX

bihRFXFfnmsWygFx2GTuO84oNSaBQkrLaNepmJsP/M
DmH5waBeGTVDY8MBjpz/X9w222VHgC1JzzYk+XZBu3mBxkppIQdJaI+NNGTqp6kOlj7bs4nZPX8E8PJ+

5m6C6C2UyVhQQVM6poDnnK0RblxofiI9TfI6o1t2c2MAiwEYVZDn6qeE+K4mqf1qekILAq93Ow9hz79d

et/Orsjwf6HBm40Ge/ySh7dYh1jOIBPwFrt8ddjA6h
+bgOr4968tl0aIR48+YnK6O+97/isWbcrqLgSdfGa6kRNGmHs6jMlDlXF8pZ3xn0Oa48f9ndikBv2QZj

AtrEbl8rsHH8U4GSD637ad7iUp27y4+2t79u0gxgjsJf20TDEFkt+gOsfu9iMeAIMqJcreXwVtL+WqvR

7tVq5Lny2NdS5fxKTbWnvn3rEUr44VG2aaH/G63BGH
VaJcJ5Vju5vGGRqql3GuFkV9B1oNJTDprke3MFfCzg9MQikn4ZgiACSwB62BS4eXXP47DJ+zbvIpl68H

/KSJRpAHnBJbZIHS3IhMjS48AGV2SSFaqaD9lfzYyqR6CNmAb74Id4PNt2RLfpunJ7pykqV+ysjDAsCo

bs8y+ZyfBtyWDgfvcOaSD+NrV7gK7SdVelzf5fRfuC
DNCpt1rp9ZrD3hnHdxiC870wKe1CVHWABBWUsffDxEyy8Q0S47hp3fNyIjQBX0IA6CD7pjWvG/sascqN

pOcwxrUg8U3zNr5dsT/J91NQl8NRRF+g6x7YCk2Kh+ow2mLuCe4C2Fi/Aq46rwB4/PsYRLmBkANjQZRQ

HmvEq5bM9aIYPtKwoNlVRkiA/yKqJI3enHWZM59zfH
4RwHKzpKyjZ8zAVjb+2IroLpCO/RJeYTGO5QEjQDklxNHOUtlEjB6jPk7WBTpXUkfxe4O60175XEJ5v+

+444Hg77/qpr+gr9L7+ayOilY58onEDQF+UbWWs6UdsD/rO4xNkDXyQcGvdndRwPhyv18keAdEQVXPDv

0oqIDy9o8GTGwBvEtLzIVrAaIpEgtzOm0iWR4n354G
irW0ID6BIVLYZ3TEXLtnRul/U/t+lZwUYYHO660LrMeQlmK/zcvoRTm4VOmVSD6tlnhI8PIywkhabpj9

O4V7knpThKwyHJ31RP9xw+U9EG7w5/v56UKaEXPBailwOmJ2KFSgCGQiNN0yKvnG92G1yqV4zB/688rH

EX0hz0Vw3cvm95j4OlVLYdZ5Yq2OKfCjAtDncUdMKR
zKbjgJOYdf+WSoyPcR8ldjPRSE1vtdbhIeIB/aetKUUdfnn/dXTm8QKJh9JZTx/LrIOVdCbOunt0xOL0

ffT5nrb5VEA3c3CHekCNmdZGnT3l/ORsumIMMVlnrd27Zx0OySP5tCZF2pWdDp8O71EBp1emeiAR0Gcr

TGvLNfC3Ap31OCaFQKLg9Uef46gRI9cTSXjtf78EBF
y2icGc0be5nzLW8Fb0xF5FLabgaFd69A3MRDZXjrkKPPsAJbABcRRZgKjW2uamGqXx01EE0KvzxGMRyz

k/5RSXexZMe3llq3FEYB4hITKDn6dlkXTJlIPSOJtPfx+n03ztjUlT6KRJPqiUDf9/YN0uaRB9CvPc/Q

GF9Y08lxgTseTuscNaI7rPnEJycE43OrzX/WrhldA7
eyMJmGleSXzbwzK7/7+JSQUvFiVoI6oaZEPzZ7cSRrj7eVnWAx2Lgm/8vqAsYkUE9S3mcN8j7qptLu/t

UxMhY2wJIMS26HcBc9vmSe4M9DFkGyovyWhmRplQB4ExWlIPVynKz98b3SbJicqFd5hGB84L93qpfaiu

6bHvj7FdoC9dRrfjZA+cIhs0n8LeqfOTNO4Gas91V3
hP3wkz9bjJqU+mOUjRZhFW5YSlK8qVNY+OLVeJjl6dJfdB5M3xA2mTLqI8josxAk83Vq/b9DgmbLj5ba

Cw1P3ycawmGUYU2DVOh3mKXC1G1wEs6+SrEpElAvPnbUILCQEG3xUGrg/VFugidGLcJL42+s632FrVoF

shUeM4gktCQ/JXuBI6zSq7aGWftAyR3FiWAXd1q1iF
V+Y1j5XY7cwrkaxAlIVrGY6Zpw3aEiKIxZc0g3pQpm+np27Q0TCWjxRyJwJ5ckNKBqhlCmw0LrP2pXMH

w3UgZVqQac3AiB+CeGMLgIpOMJGLQft7V3ZWzE0tBnnghRSE0lKA/emkXB/Zb3fdctpEfYdIoBonxQj0

zYsqe05WAbMdVKTSfHTYdlv2JgN46ipNBnFN0Kifhq
AEqTemsnb/ClUpxFQrLEFM1W/F/TuuDQlCDLv61ETEFhCuZsPe4raujB7tFwNNiUquBsjyTFOvAceaoU

k4q0kRdo8ZNgpZ6CSApaNe48cJj06xvDQzI2y1kKs5uP/kl0degix6zt7mAh4RSyYIZnnzczJ0TZysy2

1XqTvnjrTHTAxNPie++hoY651ZZl86P9D1dEa6eNhF
+t1K+Z7+mUchp5WfcUNoykhB0fmHVcufNEX2JNIT01dIFH/uK9YcleRtZcMuWP+Tf+SaMqWCJkv31mWi

UlK/YrlU4Q+ZONpX3ed1bGpEus7nXiDYULTWw/LXzuRzlLg0A//ntZ5620PfEihUd3Ur09sMR0QDQlD3

49m9YOC0MOREcthjmrUZMEA3tFQPcwCykMGgfjcWfE
lp20ZYkz3rTocem9A1U1dVSKE7bZVk/92FplQzpSPS2cUAuZykJ2lVMq+l164zGpdNW6KbLrecmIDZVD

Q0pO2FhIW12sgzYdySndAPfVAjjNGJ319zscEaQt9Tswjw7FC4rBk8oQU//POq31ruI2jCNTicljg4vw

gZidQosaY0s/iZdUWTLodx0+VbhtrAA1Vuqyrq/qKI
xP+wOSU9e+/sjOHR8vhT/4xInYYvAcflUSfpvfEzOxeoCfGLGY0YCIWDe0LHYCz9xX/rJFpTZchCMrxc

Gn9IgGbIxth7lyY7DlpRYsERGLcTxpKaN2cg5O48N08dNu9bWWNCpewn/Np1n7SVV1k8gGCvcMwzzCmz

lhHYAeYfrpfGBomkaWt5v4s9iaNZEuLyFhKb2+9Xhi
0L32zxihPogq+X+TtaL/c7pFMY50ZtGM6CZZct/UepGTUBCS1SOUX7i8gGv+J//zb83KTp7VHXhqGbxj

cvxieBBlwcfljoYcAHYZ0iiZZuAge55Ycb7oJ+KqTWAJjvrdKpgAFzKqxr98jpRGYbDakODgxfS9p6p5

VhKgtOll55+58gAlXfkBJ6haoXtI2RiXJ19ZOszCfX
/6vlw6ON4AlBNAv4hhs8fuOazrwkSkK317aW8NoOcmSH2qxaeD731pBoSbGwLzOr/szAxiuJCrVnMZsw

KKqHxT7Aq81dvmAdEOXIhg3JAwylDTdQqdc8UoZjSkgGU5ytjfzGBuWkV01LVn0LsJ2TQfxUeTFNPEXA

9y0Td7Nksam1m7AuGPkb+d7Lwn+Ozg92wNjfr9V7NU
iLNWK+/rAX7q/OpKSt8mjVv2Y4hoxodDebW68n5+9BzxJH/9qYGx9eRM3hFW0W78jTkhlAyjz19gaxNT

9qm4/VIxMPWlifybDzyEnSyfTn9kMJvdQCwPkM3wp+4c7lvet4bGuTga2Z85lTJ7EFj7f8ntoMyi1gwl

EH0oPDtUy4K3hTAkrwlb+DEyNRhBckiXHkHuGVoTVy
G2cfzUnwnvixfmmQ/OJxN0voW5oyghcdgstZvw4yk9rOsbGsKCZG8cZq/2lRs5gNv8iFKYeOBCzxGUWC

HGrEYkbUrufaJtrIEeHYS23k/95s271oEXmMKcZjTNQhGNqSSluVjJV642DaEJDszsXG7uZssKl81gWd

c+5P5V2ZdtjuOlgtu/omJfy4n0auMGX9DugeMTMxAT
OQZEVgbC3m9l8k/64RHefZdwkKzL7WIR86BEmrVsy7Qsyx9w+iyYz8NEygaKc3IDa4+Wfi05vfa1x/4V

5kBCYAu8gUlnRgPiQDkiFAyPgCu9Up2pq0SR2kYkx4ZUB8SeVmv7r83CLyqGWqbKfBXB61oQzA3Ezil7

1mwPvtoYroRjAsNxQWUDy9fEAbMEo4eJzpRRs1MtpH
17RPxvROGJ541b9/5NQIWjR3c92NRV7Gfsf42z97MKP7MKuJg8BaXquS/PlQUE1Zg2fK04RtyioYaf+c

1c1T02wahj4HiWqSb4p1EILfGKmHXJHPYRjNrXK1SDVJ68cW5JD83rcyZS5TMByNItpO9jUxKPMzSvoe

xRv14K5LvFT8FMdBHGhnbAdOLmhC16zqcW/e/XMar+
j4sz3W77Yd071bPqz+ZgdWDmhYjJmsNnNcvYS1Gj+S0RbRh4BFEaSxvwsl0Mr1GW3F7unByF/sFx9k6g

FklMqZqkLpuyzf8BEgd/qujwzqOwfv3xJRoMVsREqR55mRN06wB2q7gzS48A+Dtngd/GHfi4rhv7P2ub

cMcpeie3DN2q/uhcpm40EwSrio6z0wQ/kZ1TEjoWVS
vl5Ogkwo4d/XvhUFmsfQx9kr4NNrPXltGW8/2SlpHjsL11+N8xOsxEcmISJ2A2lN0w3SK1EoVn4PaFv6

mC4KX9tViR1yZeDJn+IBEfYF2y+wJZcYmpPZ2E/XWqEv5WSyBm/nzgTj+k8zVlqUnnldlIXbE/xdl+Ui

5h0MClUjLJGA5wa+bA9HxQu/SZPe9xrgomxvxbuuJ6
K+hyydf2JO2Xsro3TKm3k8WAzDu8DuB/kj+OzKsQJptT12EhlxA0LhoK3/p8cJ3RqHXDs2cAOM5e2F6E

AUBhFyhZCAraxnvn6a3/oXhjwse/Lu8JLyjBDB1WAQup1FsAU0+pJLb+WI98uYh1m+teYqAEV/72pHDZ

jTllkcTGxHGdayIZ1sjifSh9oceetkrc/udrvApDHv
Mj5PDUl1+E2Mfm1oCO4y9K34lemYvu5x+/rU8QLequG8+qUiyulGGD9hDMO5VoYjZUPtymyaa28wyccb

tyncfgDjCZKxH9VwzViumlJ5qha6BXoyNMa9aGOo77GzYn2vI5SiSebh5MdFhqRCsnqI+5DKGmw8Cd3t

hFSz5xA01VIECE0OnOzT3hA9deiIFxxNp8MXCNGunB
dmWiywQhl5n5a7RhiJrt6epSo6Dwx+6y68/dJAl8LUcUiEzZt7c11+ZmAhACKnEk520xey2GqCiSadP3

VWUMN2BcS1M0IwX+6Ai48YK8T17lI6AyFURTyCbMUuyKHCbZ1pd8Kh+k0J7UF16YVO1f/77Tmol60iNf

75illS3JVTrumgQf3gK817omlDtHKaOVZ5XZJKLZ3W
gjTRU88ghvkhB0M5sc2aFGlct6CjefiEoUX/TuHmdqRlxW0t3O4JdXR9wLKtxGnuj4VoyQzQoPppkgam

+xJYO/skMaG9jB85a64RItHwVNhztK+bz/EIwv4Y2dOxPCLyk+8DvREf+6qtatRvxJLSB0myvTMN+Br5

F8qdCOba/jsFvFRCMDmH6N/P/YtFJ+33XlP5DKAyvx
yIZU+BksHYijaWeZ99DX+R+Wp30eWNnbDV5YXicO3nCsHxorJ72tsy1CxOmCJig48ujORTL2vcMP2eQe

iwc6+ga78Bcvv4H0UEiVM8Ge4Z/0w18PpNGbubAEbhlNYglhjt/c+nn+wl/qxx+0+nzvI3+Q+O0htU7A

r5VhQ1Uc2WEd9VDna412xzPVarsRqgoI4tDLdng3IH
S3thZgy7o+5Ha8oox+gx/oNlkdtFtpWqgOz2mGSuazj7wTrpLcErdbQCSFxdKnOgP/GXZHYl+KzY+hD/

Kv8ewFpiV676bS4eDxJ4EUlts7ICDpsZ6oJmzV5ONG5RcPMyTgZnb0LmA5aEPx3XFdXO/chk/WEcnEsv

IR2i0q1j4tP7KiRTnPOoca1nU+ZRMNWB9ISp6zPbQZ
NXAVdL3UdOqvU4+nVrtmRB+9+kFA4QENohCRQa/I01bKn8kzZhWb/M97rG7bxW6vuAgd5oH8fxh6BuhV

Jr4e2j1n23l+VLszF7dhr3bqRB4WxymVlfIxkaO5ev8o1QH2asaUt6iwnGdjblqMZ0EHeYcbG/93mdse

QzX8qFXhvXuNnjGAQXanrvym1clyPyMgoUoKK/+uNy
zpWR45Oiv9dRzZlrICPXMyetwOtiDSpjJN1mYM8jZhhCmcpcO6QQwfM7arnrIpJ5oRfNi9A5Uw93Yeb7

hxA156zY9MLv5omChQqjlh/r8h5KwnioMOSwQpBLmg3OgsLXnmwZTHUKO4yTJUy1YcSWsp+y7emO5Mn9

GRduFM05+ktM7Ovxsghw37vGcNHRL98IwYPbkCJY+5
zzZOO00KCOksDGqW5r0wji/RaXGN4V+f6u+sJ8cSHE+3hSm5nxoraUxgtimXLtC3nsGEpO0H0d0XXMKw

qACZL7Kc4VZB6uZyDcxXFl17sC9XDl3HWA3trlx6SR5dJNSNPQPKEEJGUNjmtNocr3oCL0yrB5Z25ReT

L2JbeLQZ5LZ/NGRbiiqN3HigCKIAQ8sokJbNhVwxQ1
J5F4FQ3qyeLrM7/dZOcf5A+yqcUCCS+B4bcucSEBEJDIb3AhNNfzc8KNcn8eDT9h1milMztPwoM/u9Gm

M7NQkdy7baKOW1S5TRXijPXJ/e4IWgs2wflwQUjVZoj9TcWlM6+8QvydpSSFOvAdQHMVdf/DrZl05nmB

0DhmVw+2wJ2MhU1XG9PYKeBVwS8MJtmVjLaVz8Kdw8
IfjlgEQbxJWvh2JWpSili3aPQKmpzsS+eyism7nt7xcFq0ArwUmXbj2hhxBNknRMeVaOc8pzJFhjmr9C

ko3wgmYpydJErOw7VkHVX1a4csUm4hSinUCroVypQBU02mwe/v3YvnBBGynK2g6p9avHaW7e1YMlekTl

rQOQxA0EaE3+Y8DSwT6ovS+q9FbEit0DnN/TcGtZGy
vk9In2J3j1QxEXmkjIe8w/GfTOJMIq3XK17ssR9lp94ujKoLJBdrYhneB4bZKFctdagH5wIWH3yj9XWD

E7sN1c2pZe07iNWQMoBisWOmu6rjgt7oNy7pGZTFCg8J4cR+c/GkHW0yNu7juM0njnQ7EeZI1dm6g6yl

WymArLhLefxF2FT+EZ47CdR4A+yKNHYO1l+QB28FGv
xqPRXCPIXX/+WV4w546mAvzhzaGR6iuRdXggK0q2ifFBXNYQuPaZKROq/QafejtPb9bh2dJgZ5h042CS

ABYyPrLDyXTHdTeK/WCqzvvK4Pz6m8FTo7JBVUn4U0MDsQZyghlWngacpRI502CfHm0Pah0Ukt+SQWxS

GgvQxjUIfLMwhVN8x5vpLFGiZlCmy5CCeEAA+YXtJb
qOKW+ELNTSmmPWyvC1wM71mVeC1+TSzhwfHyysyZFQ1wRhXz+RxdSc+y3JTQud8RWGJnIfuUMMbAJCnv

+81OAnUNDwDjRw4IEDcrYeIsnPS/olgBhmgy/EvUHWpHKmBhF6xWKmBcj0icrAbYENR6sLPKRj9VEFUu

hEhpidFrd44kp+fLJiy0sjryXYyGNCnNeetUjvTA/O
Stc/JtJnxJqbewHqrEc/eVSAztibCnhBMxvY7lA528THX6g5+Q4W/+6zXKIQruFzN/fJI0PIWRNl/JW4

FpDAcYuXLK7QqIsUQfJL9u4HAs7mC9II0kwP9eQAl7uqdA1xgXXVosKBQl4NSmQ4+p+zSV1U81Mok8fE

B7s8MfZ48vFey/yqJx3cvrjRTtO+3iA4jF79pqbiKF
spZXAcGk1OPstaP+Zv3n8g6vsH0NkvZS8snoFspDc4cZxc2ynT5+cUU3zAWXEBK3ZVlAOOBewyCAkDlE

tVcd/zya1+LFUf2tdOPc4fJ0VZ/c7TGqefUka/klPXsJKO4eLWNDRcNWs5R+iyoHyfcnxJ82vO9Bf5kr

DSIP8NyUxBi+Gis+o7jvs72okSyMmXflH1SYwZwqFy
ClnSznGgp3njuovVdxfkFZbse/ppGxH/V83pBOOWo1WI/DdFVodnl0TYmmp9Io3B+GJv6MCDcIV0Vw0O

0IjYY02GQy5I1VgeCDDvd7D1rhfoC8d59QMQl0IH/ISAyAg0CSATMzqFnx53pdiLrjklDSweRwAdm17G

nl3QLBSJ2mwiYaUGeoKuWM8eHbNR4R3JefFTenjyD+
k8/u/E1+BgS4WQEqbAxW6GfRRLbTviB+HTIsL6MLM86LZxH0qSON0NqXXVnEuIZxY3qEm/0YaQ96VzVW

hypNa2WrKxaO6XK+g4SF2nspdr7QJWB+S8WOlfTe8BKeJcVxdwS1HtIQPSPK2SuY9tJlin/nS/9fWKK3

cb5ucY563PLChVjub94W/AWGTICf8Uw9uA5GSd6ffs
L9OxJ7FVeGctpvSEZvkagM473Z+bM/O972/i3ABM+9+V7bE9Ek8gtRrWY7OJ/c8yfsl+BWqFICr5NpF3

FvjGOkwRGTVVWNkeL8uqOqMs2IkEvdNzx0HiDZ9HSD4F6t/v8fAY6ChbBd3VUA+zDkESYrP2kf0exuyO

dkHb65eFoovBw3la3T/39URmcwLvsPfPowEjF3085N
YPj94nSswC9pEy/1Nwh5rId/2BJvV6Tyxa2H/HcL+vAX+AvHDR6mSgyEMc6UJG3cpfthXv2tZFAAjlO4

/XdT/35S5gMUuXW7pEPxL9Q2uMUlg/nVIsA8m2KPSDWsGqnsavuhKMwro4PS3CMYKWB2ECbHbwGRTX8Y

zWbUKD1cSs73Qc87QEoI5A09fL5zI8HVpNmzWNZAbO
/WB/xZOaRWCu27CqwDxKWpmcc4sAMcPg2aAkEMAjrCAVHe3YYHi229lb7m9fw2F2cG/7iIBo68obDuUx

RCmIVYSL5wLdPOi2AA+zcXb0kCKFlRi9d6W+Bn1ajY5AEOSe5ZC3O/TFd3es43V5uq73GEw1FIogWYf9

ddIjiUp6ykNIwIrGNe+lZxUbrQHJQ4095d2guxGjBa
cgzmn0K37u0f8h4/ov1g7/V5d6j7H06o2z8PmkDyvXdZ+ABt1fc9m3CuFvSFSngH03ZSEUnCCWHB/4Hc

FhVqAjIMdK4+Tf9lTxfDDyo8JeKwBfgzG/8/9FHlHOcFg2zjx4sKXM/MIUwr+wBb4ZvoNV12pbgItz4u

qeY84NH5nVt2J/QKIqCT+8mn6GvvxQZB7LctaF+Wla
vyW/OzXYWRJaZchKDXcgw80/mpBmyyYTacFld0D0YsfUkoZi9gWIsdt/uSvIPp9fLIka/LDy8tIBLXrz

xyqa1fm+K7os4ynRMifz3X27yBEHeXfR2wIx3QEydjdpMNa/fELRelNForzem/5l+quUhzmU6O6tx/Mr

1BjjhY0ky6ZjRW+m6eh20GjLXxk1SlyTn4f/xjsBsu
hA1gPlg9NvMcKx/bgjv0i5HyZ5hOX4kV586Yy1H8TzyV71N+V9MmDqEkZRXw+W+uNPrYUzr9jq2P825E

suG4tlnxPdOFRnCFyCq4Ju0OOVz1m3R2UyWzHx4qybB9gxyLQ2SaIHpuf5b11DZBsLvTo0Fok2OAz28/

nE+wlaSH7GpbNyi+7BKO6lU3b9y4BTjz1mWb7YQKkb
UIyH1XFAAiDAMLrI6oKHChnIM4QPOxh0qLA3WucNtK5fhItyCIKiV0/RgF+vg9T+Dqa4wREm7FseD3Sa

edhzcMHKgf6XRyWFbr9boKwQf8q9O8ofw4usmau1mnyrYhhXfKj0zSFM5LP6DPaWstrOjavXoSf0iQEv

jSo71/lc4kWJUT3/qBwF/LM2AxyC/mgVSyLimcb1jk
8tKW4m/BUWse5kukL+fti48SPezOMhR7PDoDd6OSeR1sw6nzkO9sSP617BFTvxhUyViEQlu7gF3PJyTH

hkul1eLtM4l/9EuT4/CxcdVB1lCnaSU5QSvM0FygIvVW8yD03dakUKcGM7pLvCSSsCFLbyQ7UHpWcB4W

W9RKBEE4INXdDrj2jG//Yt0F35suSeg7Y/3zXHrVb/
Q+bfmmD6SUgWKqlYdPjPFVZnWaIea2U4FITg3PPe8PFXAYt3hsmo8Y5loArnSfdCzrH/d1432dC3RsMr

b6G1KhelYllKPqfM25bIc/EFz586Uut8WIVfDlqBWywbriqWjJTRngoyGOwCN94MCcz6LR8gC5/yykNX

s3itYL1exU0siarAMTWTSGxUz47KMakZOb2cpVlRHm
F2SNC1l0iacjnNFLFbfsQ4IvCzexRq93iVfaoS7y8h+QUrECFCo1Fi1huE/+AkQ7ZpeQiDqp7EuNl3FC

esmIPURwJ3cDeAUGsQbMfz1H3wsg11RhUs29unotvLDmBgsRXqr0rWXygSp/Zfpr+PtV81CWlTutK/W+

v7/RgS+oLgahrl+9vtjXp+mEKykZvs/GJJOF3z8+HH
C2smhdz/h+s0IjT6wiSoomB1geauUQjxBmA4WA3jyD0SSfpgtWFFD5L7W2V+45AmmpUt6OZmwal9tMA4

+t8Hx8BRarmnK3enxKjV7LYEK8PjQLd1oYv22vmvSkdVJYbuJ/pp74Iy4PyF0jo4a4770RIXx+eU5Ssw

63NrnFtGlbP+tFHgZ5H1wC9mPHxbCVQzc2NBf96b4A
3GSeWLwR+slvshnqGNfnNvg/sQpFT6AZgsCCxggPvTMek0NR9jk18ZgrAkyfQZy1qjeOCxBH0RdySR2S

HNzcygRyvLFgjWilRUw/pb40/YjjcqgTgyiBJEt7rtqtuEpekvh2avhrxCnpGgCXyhckIGDymSuigrzO

S6nwT4Hd5SlVIx8tpyKT5pIvWH1p3tp8rc35J2sgT0
5tPFgZLhfIsG+4sVxMo5m8PhAKOibUjYJeWdsK6F+5ianMpHGFkC6qK4+U6QBf5GWdnd43TKWZv/LDNq

1i3nxkuIEiTwojw5BylUSclUmMb+oR/8s22c102Zoj9VRaxd3FVDJHsRVvI/WqkjurQXXep6kxqIUQ6q

H2ZhGLBh+j9617ZnxGTuSEK2nntFP6KtTatfaxJuc1
RaP1ojC7Jde4Zm+Z7WVR+g3ZAIGLIh1F2vJertVycIuoslRxAfvgmKqXV4fAzv/P0ZPPE2nr6K2nFhB8

cNwRFEtQpyFmfu3y2qDz9lw63rYzVj7ey6H12SdSu89ud3bTyqLhvOW+wDn/JXflOTiRvlW2krbo1m73

88QeYofiqgdorye213iuyt2Vlpe+CqtxUPQWU3ptiT
fkXt8/6C6jf2Me+dhmieyINOGs8R9LFUS5gs4QRRS5/XPwt/h7BO9aao2HqvT/UNYcDnFdy/na8KGQwU

AaMZ7d02us/NL4YT4r3+Ad7jmvEIdTe5SBM6KtbZsrApXXfdyTo54CQl/HAj8w2VO5B5gvYVvt2zOoMG

i54cvmf92TX4Ixffoiy1pPQ6rc4+jsMEbpIFJTedkg
1rki5ETA2EguxRntU2Hxqf2wIf4cPp/1qTr7g0UZgPlMsvPRvexDs7bx3HE+T44wwNWzDNURdS1xeHTd

RzeHlT23EYrtQes40UfZtKJAfo0WO9L/AFUFT6WXfNTQR93e+fTX3IiLfFUM5ifcFq4I1SKaGsGzFNBd

6eHGbTd50oLLfsA19e3fEKDrAfxdyVToN/+TJJ6vpI
YopXneeLkFhq+9xDb9DaTJMUrcgIEnilgnTnHQ3epr7TmJ0GrKDL/A/f/EGvbDLBY1u/M1/fJuAIu0vK

1lqVWCHuItOPYXpRQhLQlsJ/3JjZfISa4THrGVtzHn+8442D1lTrG+pQqXMRPvCOwmj/gdK5l+UR6M5x

PrisHbccWeujyyRnf+5thYeTq9ikoWJDLvlfewCP1N
E2EkGLuus/6QYSIona0QMhRcGdgydbWsQPtfmkIZjIWBsUCV8DZsuXl2WmqKk9r+58FahHGYFh7TOdu6

cK5RhCli4tCNiCfXOI3ds0PAI97zB9osMhRTfnv+vP12rVOnnfuryVymqNMDVgOaijsQ2rHY9KpfHfS7

BfMPtEW+aknUt5fptEAwa1dF8vEXIdQfpY95PmyOU0
wXwFoViTwg5ogQdYxDIYoJeJbulleEt02wQlDKpWd1KBDOuG3P4Ox98kMw+jYR+0tWM2fDDdS49xlE3A

71Zy8JZWVVqO1mnsx8VkAJLvjIggVHEM5Ai49Y0wgh1Nfz/rfLAZV9pA8Hw0sr6r0vY/Hyqr1nEePnGP

DcVU7GXiPLYN2tQDFvXxaZJc/v2xTX0p2DEDXiw0g5
RQdRDI+zDHxuiHft1wVTKd9em7e6ydjooF5DGrX/Y3H+hX22qxjowqvDfocgX+h3aPPpYL78Ubqnq9wx

pIfFguAB2A47Ho/8uPaPK49ZDxNas4QqXkoJRjgj+3FDuFzygQ6tDTesdqb4+mF9v1hwJ9Rza0iPqXe0

bDzXWmS4v4Uj4RxhTlx4sUisWnQV5754miK9hg0zze
4a1C7N7U3eiXenpVmHKY7Q3IURlO21DMu2xHDbqUmdZ0WNY4vGpmT/4hh+E2aXEdE8X1p1Tn7hj3rVNV

7hydeBYWrSLB0hlfykF2YRGMf4mZgkfkRf/rHHRQ8x/37WM08L5hRqWV7TiP9qXTaIUBTyUxMq/oZpm6

qusW/jihdYiVmOx165wGbIg31jOp/roo1J7oLJsrKe
7zC//LkR81m/DbDf4OTMEAc75M0lxdaMTKbH6ZPrbXcU3nTa3JN+ImCTTcrKmouPti9ybubWHpXxxzH5

F+J0a173SzWW1is/2j/IEUyxbF7n6kmilGMIgZnkKDli7Yt+Qk406XtkJAi/tsBa3aMIKPSFjVPzHCLF

hMbIBkO4FOuDehUIfY5k63oV4Ocjx/vXOmh/tTHl7W
edvJMYeVZxLP0D6/wZDmQmtmxDBQqE2nmE/Q6KDCfRrutQjsAV2caUAgUmSMvTXBavb3D68Z7OasbKdV

bOMcjD9Q4i+VAAZY0AiQt1XscrlKly4yVHiSgWDNfL9PeyEDkxqXWv1NeoRnWK6jwZXb4f6/5J4J6xOi

59Gu4ntSW0bOPsf2VSIaRnAT09hAUtrL2rrzbXb5Tb
Rd/Sr1HY6JuyGpNQ+bX+wVHBT6w3Tjq9hKDKY69XYx/4hxtb8Vg1coOsFd46n5gYN+sM58py7rhYwbhw

9jEnDjcpvLc2v0YKga1TciU+m+qZ7PHcw6OmE1xpaCMbxUAU3Wx1gL6b/QIUtFNg+9eA6KlM9YfnxZD2

au16w08iqtZ6Jncd5vU4EFsdfr7mc7BN570w0iGl0B
5Xt1hbUpQ7d+GFFuwCZOnKN7JenLHDRs+iTTY53hh94Ulxi19ZTBP3rkRd+1iDvKW2WtglGsbpVZLeqV

2f+lGtp4wlEN4ZKw+aafnO+W3aYXIHItj4H6um6vJr5SFHcnUQ6kGlNpXJSU8iMBy4Tnq8KwJCNsx3aX

fVjYON5C9T76bb+NSOFn1hOz70FbqctfXgryYHCZ9Y
OpiyeOK+RwhlJ91bSua/TosOm4oYmEwwI809ylwODDmWLkK2P5ZblsLUcwd+lCHzkkEiy9UbxoWloJ4l

Pu+5A5mgP5Rl2pk/HbdOeR/8jEjlTEJn5xVoQFv2Buj3cQvuPTQWhAcLy+JoXkj2Sf630fye3s8cAHFT

EgZu0v8r351jiY0Ja4sa4PYlTC+lxrGcRmS70FQZnZ
gueyk0yVs456Ssj7h729zKCiQdPOIIcQI/3CcMyJ1c8bCC/Rz1OsiGea/sv1r8gDWp6cBUptY7p80qwz

i+l1TTGEnkvwbnEfDTGZmAH8fFkZ6znmuMuv3rHetr37qTfoZAW+M1DzlJwONSPsnc3ZjI9y3I3s9zj9

7UwZy5IH0PMkJEiuYHR4AOCEdS8y3uI130sv6+sx5U
FyQEOYQgBKs+/yUQC49KqX0m+EewlvOWZSOurW6mqAzU8E56l77ObbVrPz5t039jBn2w0cKjO3CsVdBw

mvAb2qmImaUqZt/VZBf7D/XqpI5HI5F66efn9cR012+1xrkfG3c9q1aZEVDGTDWTW/4NOrPgkaExbPLl

dGxTg+4hz19qneH5Rfu8M9JvuIbqovihQ5BuAGsChg
VJYxDCnQsyKz2mh3nXDOCpKuJw2MtmKHW2w91if9cfdVlgY1T0HpnmCbSre1+Gg2GYer0/+bW0ihstjL

+x6yb3+pYE3xWutepYL0gKR//teAovhgk+MEi6zM7IKhTGrlBRiVUMTttcVtplL+CNtsRRm38Tfi+vdW

j6yaBT7G0N5/pQyTQ2Geb8VGGBGo/zLyJnHHSQVaFs
+DlqPOpg8U2a0n5wzxTt06kF/cd+M1y+/qwIqtm1hUOk2oqOu6xN6/B9mBg10soEeDOAi78Z2UkB/xf6

b6+CbWCiYfK1mPLh4U9eH8J6F2keskLuefbI3bFN/sPxIlsZPO9RFnjcTFZb9NurEBxG1w9nD6ax7n4l

Xe0J/3306n4dgF1PcNdQ1WcLsNhn00gl5Drh6UYEzH
aVC+KRaprsLjTrrAB3O+FqzjH4KPUQLXdm1xbWYSmX5Lb2hFwArJtIyWf7dOBNhkOquKhoSX01zlNOIs

rucPWyRAC4x92bizKWmqRWdsLCbZ3jxOXHJsfBxZugXw3CFDpdDxCH2yk8wO+FFcMjch2KRXowZcyaMI

Y6czDvx5krR/tuySuuw7li/6lSkEi6DhrtcjoQaWCI
Nj7MDW3g+s91b496zsEe+7BkbMTwcMoktq1fP4ZJ89kWb7dpJxdAv6wy57K9djrzIYKP3acGtIiGs4bo

0Wx8xsbKw/CDeevKf+w4uF5f/wozA8c8j91bhbtqZhWQ7ObYQS3NeTbYL9awx1SEJfsA+ZuFR6ysjT/7

v6+rM6iIpOEYkoMLoJWvGdvju5iw2bglFJoi0Nx+J7
TlmioHON8KZ3Tmilgy7MZ8P33WUYwTwbYJ24l83NmQfCnvRKXhV7fjWeZOCxWz1t3Pja9r7Xz5+3oebz

t23WioMBl3ODiRSftj5+L2R7iRFqKgkqVKgib8VgrKiJBOdjmen92OE4fbuHtXt0ehe7oxc+wCApmRdN

/bLnK7DlfQG0SnptE61BjNb5umDtP1HuD8PHUvfOaZ
egNiSeiwCov+2agwGfc74Xo1TcYMQm4rXAfEcdcoh5s+AJ3NtGtbqJ39F1PuJlan8QaQc7w1jhpC5bMZ

QFwqnvuRBI46Yy6xOo9QHLaTtThZe78JCdbShP2CJN1xELXolqKzgnuOBsc93+bDStEHMlDmpbrx3yGZ

qljbDir3h7f32nN3Ebvjo0FfGmd+2v6XfFw1K9oXn2
/PfmgQuobNq3F+aSloCHxXuigjw/JFBCUX77IqgxfUiUFElgYN016ouq1ySUCRx6UUt1cPdm4gCWC6m7

MmCZa1urtqTlxgW8AXJWHQzVhrxnSDyjssgpATFofmlVVqZMKN7U/2bf5BaMLh31rr8dn5wRHXohCMCh

5hOVU53XY655Ag8/CTJA5l4a6YVF768V8HD0tzAcFB
1jeM3RZRfUdwSadiMdfO4GtMdXwi6hK6YJhmad7BlabLHh/vqtOj4Vc1DR1hogh4P2pkg2FrHFYu5iqJ

ZOS8PmTvu4kX9qXb4bwfTYsvxfc3h+e6vpffgFnqQA35S/yBc4DSo464UcozP5tBDO+vXphlm3gwjsNy

rb1/+qqaNN1/lLAh7g0XF/Mp5YG2hiv04NWQG/CNVD
jk+bNm1uyhcoacM8JxKqCtIRGmJ21cHtvVwh79w9JrRmBh95SI02PFqVrwLqxWLD+YhDL3ttck53aF1Z

HOhe9LqRoo53Q+UoCWr6udUT2yQIRFtmSOq/nJ0tKl4HHgYuag0flUkDrC0pbAh1P0/5TorlQPJ6IaTk

vY7HaUFUSqYCZPvhMK6dZoQnmGzBgVt0gNJZIKLSMg
ZGa4MCmgssd8qVIo1G8Izoy3IX1fW3t4ACl9U/5+qhuSfYKDRcoy3nCFwaGutQmkAmUCIup32WamB0nU

92ilTumvUTV1/YQecFRghwfdJR6Po/hsxYCz23G0M9354532llj5x5FNE6OwHPgSvT6/KYLCgLbe9chb

hFD24M8mbZ2jbvz0WvdOpgCvbrFfbgW/EvR3gueHm7
nicGQ/pbE3j6gj5SD6JkV0qed8vDSyu+G7Q4/wc0SCFuA0Id830UKWO9Pwql49QERpki+CqV75uhJA50

tn7/mQQLMXJr3UkeB+9KeH663ekxchzfF33+FhpbCaxDep/vy+tLJf/btQBRvHQNMhm7+TSQhx1ZucZ/

AKKE1SEd1ZC8UOCA8X/O97WjxX10LreLbytCiKNza7
ghBIPDYnHLEr0qpmldBIC5uYYm4gchA9IcICDMqtJas5npQhgRvCuW2fLo/ctSB14bMrNrPiS198YpBD

GzDbMAF4t9kNJ9Od5clwaqP2PmH9V3RMOTivIdjEdlauQRYHNj1fcIMbiHyA9+nLKVvt6zHWGV01DzM1

3jcOD+qJwH/lBU9jbpZ0DHe047lTENf1nuFMhbqbBp
/PfvgukGyXIZrwo/2/Hx038/qjNWbDRTD79JeS90458SfR6bfsmQTciRCAJwNM4EAZXl2Ge+Zv5AYld9

g4Z2AAO/ClmtmBeEN6hL71cTQWxFOo2eRB+fv16LplPzF6KA8B5yB8dPdo6kir0mq1Z//uu2tPrVc9Oc

O6odLjLpNjJ83ze4HXQSDBzXq+CLcVDFHlIGAY9jje
BubmmDKAxfjNVoZDDwwibYk93TTuLGeaifJzj2ne4ctmZ53k5WopmFtXfeoiq2JIza/rUIWwMnOhF4yC

V276m6PAlgp434Tsc3gXA4V589cXWfnVaOM9J2ezo990jlAnbFpQpfry1ekzW5oHhCCP3tIs/UzuspDx

IR1wMxqhn1MAFH6zGsmmXgFRmIqvl5vP499TSkZfiV
ZJ0MXGw1myz4e2A+/CRvL2drBhMR0vp9rpj1OPC0PtRTm/K8qVeO8B8Gy8591Iwa65+H3GFWXtz8lycH

qSmXDCIF+EPcZPdXE1EM6LikfWEnvR+TpSr6ahyH4rwTTt3GKFhLkwPW74o4sy9utLlk8C7xvXcFp3xU

jyh0mEpGDTTub/itd7aF/N0tOmv5x4rM2VxIvsrlay
qsgs2ZromoKQnMpoOup8/boTpdQ/4WF/uDn0eDxyeRSA0lM2Ql6ZQ62ee+rK9uU90T32gD8WPklN897Z

dfMhqCrPhVzUSZVi4fbDBJcu/6f7yi/o1I9pllTf8vVCkTP6uPG2hl5rMCsx7VyaAYxM1TgGG1F3Tv6p

Dut4p6MkLoc8hrIr5uXkjsxYM0De0XfbJIxeO18OEP
2DS0RML/NizUth2Y45Sip5mHjLRIMgKYtT76r3e5yHuLSXfAsddB9QY0uSgvIY+pRYm7hSNXKc4enxWQ

piSBPLc3n5K+s8TptQPvbjzrXI7AKg2R2BO/uHpA8lfVPFeNz46xp8WdYphJCwAWAEH637wq3P6ER/ZU

iWdbDPS+kZtMLQN4DtWySEO5J3cS5rgpZ+HEbWkvZN
8e7HmRHUgE9JCkIUrP8zviO4Ov/zewqZC7rIi6Xiz68F4NyvW+NN+z1Bv8i5q5zMm4qQv48BLII5/wzO

pepptPsdXK9OTJfwCUuu5MzhLtV6DBf3V10hu9nmx0A4FDuZhNUhNcQrboKYsLxtg/OwZhZxGd0gNkBP

Qn6ahkYwp6PET82FO/CQ52kNI33RkXAF4/LhfcmMom
e9KhsKz0lwRh5YsceudFA6n9sx+y+I+GgoMhc/DAeo48jH5srrOv2BM//RbxbyqYNZYWoFGT3povNKgW

gTrg/+ck99aAHD9pybT6QS9GFU4yBYlIJtVvwPjcqzvSkGpMtplGk6j4vUqQ1qe9Jf3mi6LI4F1Lc7zh

AnMRh4vMJsA79kR4zM2eyfTCRO/QBh4R99VdJ/zLhD
H3CDRDxzj4V9o2cXrf6vOLgEP90uHu3214dOhh3jCeLmMh07YspqmyMkvj6wwD46a5q0I1WcO9u0amq5

Uyg1PXmF+o04dznIEPpY0Qfzd/IAPRIA9A0WCTe1r2H0cy2WKpFHDPl0WOCQ8O7Qxu9z0MmtMojHxLxO

gY3HquL+GCwnW7xAuejjm639DT8YhQgrau5qDFYiwb
Nz7EkJDMOyzub6KAVMfHbNofyS18lRMZ+8hGZwUS2fBa1J1rhgZCkztv03Q3IjAnCyxw7jngyD7jvreo

07CO20pvQjveywn7u3Nlma9x/+mb3GHiq+5XmR3NIXg7u/9JQqQLext/uvvqyE2BeHsSLiaTL4lSBIvJ

sGulbV+qaUtWZCVelBRAN+NV5gVc6OCjBluYr5GclA
2h3Gott2YRHvOQIfXpVt/NkXlwY8ZECqD5OR2PiPOQ70yF+poN8t0ZflIB9L+e5wnsaL9SWGkXCCltyA

CmJh1eNYvD1awNlUXufxx36dzuixDawmpCp1Ub0xIFm3kkorKze8UoYAqcH2Cx1QyN+P1OJhwg/FB5xJ

1BGfKZ/8Ls9ATQxMGLxYrQRl8Guce1GZraDan9f0FA
U89ripy08H1c4jjOwp7LaG178Ik6YRGs7Yk1ECsa5zSWcTHUZiHcB2NQ1AKDywC0nI8HR3Yr9v8tL67J

VnP4KB/nMn+4UKItO/qqqkBHYbgNzWzWJNcgq575eoKqcZQY2GnYzAGTx1QfG1l0jMk1URmYXC0jstAV

D9jxuhL5oBhs2p+TnOHRY9JeajQhR3pHKclNYcF8RH
lV92w9W5iN4X+1e8whnOoBSVvwwhWyD3pDF7m3YxcDgV0t/CljwSut1iD0vtJdnmEJfVAuuWsKbXcY9s

VnYttPxflUBoVMMzA22DhFTgESutfSwVY5nxLf0KbOs5fIFpaMiTclUPddcjcygpfW2YnuKfY1w6WBH5

5SZRpwl/j29eDyjItxaEv4rLpVId39n6M2J90O/cE5
QiL3iwN7EABtkL7rRrghF0nHS4XZmaPrqzLo5ya8bfXt18Rzn+oqQun4dIcclYPL7k1nhjOY0Z8ZrzsM

TbM89KgDX+2gfmq5Fy4wpDrizeKpnahI+ULtHgxyXvzH6htelG7YV7apXsB61ph8IhdpKEbBCrJaKqh6

7uTb7qn1GGxskEMW1L46Dlsapebzh3u/r4OpvK3ti1
Uyw/1jy7Besh6UAW+SFyv8Tf+NAIjrXVqKrizG88NTDoA9lNH6ccsc6u5NATtUt73Wa+lZl0Y4rf1J0x

GvCkzPxY5W183X4XCIvHgr4oajzJsB6kB5LmmpCUjsS011LDZPrwqYJfAhPaRcboaN7mz4J5VL1u7Rj4

FdGdq5O6bZyqaxDoonnpaT4/0aYuZG95zpN8/bHNVQ
gggRjW0yB1Nf3o5mxm7BbD2auofVeRi69vqzig7ahr+FwpMgUm0M4jG5KB3jvme4jwIKFJAOZvrnzs/e

t9VE8sbgfOcxVdLVpkEei2kzfqPmeBehOOf9VaTo060MvBxNa9iQV38IN15qEmOaOL8eVcf3PuKnTuUm

2etQkiJogn1B8WsfmYZnIduselNkEKqQrodmPp3hpD
wENH+karZKkk/5irdv7sMuedxs3h+JHnZMyO8RLotXJ9JC/k4OyU3mVlmPTB8dEIAUMqB/XdV96PeBhE

t7vFPk+mQIWlyRPBk/zPfopkoNWGOPGRUQRodrYRrQQpy49K9sYUIqii6m1lkjMpW85ff2aCKfzv7xxL

r5i85ITz7o3iRMVWOmo8hEQCaQgHHeBrnLn4hXyBYb
GtdEpsOrZsCDoHGkvT2K4c0GKvI4xsJQXTI+j7Xh2xHihgwUgbg746EFv0yOMtA5yRPmLUk5YdJH3goe

pbBhfVFs7zdyVLH6CAsI25+Xf/y3i4QRr4Jpy6Woo8/AYNxTkorEbV2XfkOpjxltJrXBnqRRe29y3h0l

xo7sUkw7wOCbGnQ1NEFfQlnitoBNe3oiB4idM0Gng7
iuhWW+pUwCndJgEZyyE+mJhDWE6PE76p+ah0TELfDuMReyE7WN1eZziGLhtO5f/jTMxSORh1ePWO+ikR

q0c+i72dqiKj4/BzXxNXLg/MjNhDGkTu7W4U7o40sgaJYnudVGDSiFOwVypgPG5kzpDGqmeVsY356cA2

XZck5Wr5/RtktZiVuic2lnFTaBa+ROFmbeIozVzg7D
roduH5GmC8Kj/1+hqPvfQ+GuIuqMJkutU+eNeE7LRvxHA4vHZV6cCxpPRdTsUGVKCoh1CJ1XlTLAZW7T

LOlZRkxDRhAyB806LjS2e92kNjb2dyOzzpMsr5jjm6D9fhIAzhbiWfo2EuuttYsWjtPHciCSGCJWp7o1

i0Gdr2SYycifzvJuyuwdi+VPS2+nMU922oGcWO0myp
9tHRqlD0OvrnoKCqvTwTPhS9KvuF/f7X85ZhS/rP3i72nca1lfU4klSyMMazUjJDqjfbK+1E7kOsIF3g

h72Qs8TR/jn72t4r0HgnvfEmwzpBbWnaSD+h6kwomqGTkBS5ycVbxTfiJIsQWjE57FXh03vdfEmbYjOD

km/i7dEZZ5EUK5qmaP/5F6HDlfSafLuDOI/SKvpZM/
IrBPfyvZrW4wb7mnPAZG0BuYM/3MtbRYO1HPGzE6E0ZndhaMdydIHqWS72DiJEco1g2dQoyv/m8ESvgH

Cwt7B/m7zK7yDoz9udQpPyKdgERRJfutD9tXNpHP7Rrtiw0euBZFXJpMUuaj8ba7NaTt7lu8n34t9v/S

BhWTYsfoQfURhBY4dzgJqN4KltM8XlUbaF5GyzhKVU
9MWDOkPra55ggxlmpWiIATZOMmgE8oyt5XYsMo6jwEZLk3siOAMhGx+Mr+yLRj+sFr+HXxTO9cZ5x+sg

di3Ki84H8CbIy19maGBmLrlKKo9RDqgZVVZMGyTmksZjWKCSthbMGMX32JC3N0iPYauYH+28LFNbnWvt

f4C0XsDpZOsU1EDcCrem1N/EitYKHlfl6n2CiOLHh+
osvpLU6QwXOQ7x7PDLLaI7uY2f3X39Q7kmz38ylgXikCj2b3wcewpBL4+W5tOApFgSGQdwV0LmkHJab3

WuKhFuzFGcQPer9/B74G+9gI5HN0mI5horGtiR3LLhzy+vuxjMn/XY+w/5jYvrJi7/NHSpv+NFpYWQwC

acImnod/mdINXdfP0GYKHHS3rCPYU5zJFGgnU6OahM
ymTVRIgDOVxUwNZXmS690QB+rgmalftHlfn8c4yF+jX3/4sdSxajrZCY1q6NQ5VTlV8lj8lbXD60g34L

xQ8+0WUG1q8r7Oh2IjwW1FVfAgymaMGq9385HMidKD3kgyk0YnrKabe9XKiJNHWs0liM47NXTQjMCZ5x

hDrugxRcEuOY6E3d1XdMkL0Mwx/OzHxF9X+l48Rm8f
aVjEt6E7AkbtshC2suiapRN64/QTmWxnK3xdD2CW0rXR1qtDsV/OXU6y9UV0wg5tixVHxW3z1yCwaB6Z

kFdJWQGoveXhItjBK2Ed4ZG2uehLQ/qTcCWRp+NtFu28IYGw/qZh1JYjCAEQnnhON3FPE+dazXsme9B/

57LCjaInTHob3TrefYNXiHYz1NvZ09P8+jQ8bK2m3M
BgzcaTjKoJqxxAO6Z91yMs63LAz9GP0xgBvNW/5zKEuvc0aIdmbq+v1e0ssmWKYXe9aB0Ou53iiXsNU5

ChzICyGw/YytovRnJ93F+G0o4UZEEj8R6LYNLHQM9nDXeBvaUzJTLo5qzXVSJKiHBIS2kJSLR176UV4u

siNdTQE+aEreRGoEN6uuO1nfKqpVky/i14s8zz5wd7
q38dclqW0D8OJf9JItB+io5mY9HTAKhjjo3/tCGsbRy/NadmaKCtY0E5cF4XDY6GR/TCfeuQyAR9b/3I

oS9jvHjqIeYkSq9CzIulP+D6SQF+IhM02yGUksK2rECbFFauTNeLP5AyNkGCspM+dSdajVyNSa16nbKz

AUM743gVNH8C6zuA1zc0FTiFPf1FXb5TGrToDLmEvq
j667Jzlt8BllImyAT3ZhDJSa5HWa304vGvPmyaP6LErjUzsrina5Lc2TGEljW7HhSLZtfePVSE3413Ng

0epNFLoZzXH6wImTpwf2aHCAQ6cZqJbMdCXZqTJzBuYaQAO4gBcJW3x8Q7pab5B589/x//yiuHTjm8lB

NeEubuYcx+WNdR9/1QTmrUlOmf6PMETIjUIfVR85ei
WpoygOKDruX9szO57H8155GMLvh3k94hzP0o1poFmPzq0nj7FxNes22e7mLI9frnHHP3+gQisTNp6GTx

d7LkRzJ2WnmOj16PSuE12h4uD9fo1aNiAXlztVsEQtDBF/N9ddC+ZY1HXfSYEZcfDccw8tdRrXzhCEvZ

kqK29JcQJEW6M9Oal5AKYoIBKv9wO8JrtKcUI8igt4
t9mSXwQhszYcJGiSUxv1su1gS/zd976Z3zcIAD7gmV8srfCkpkcEApFGeJSRVm8MBXb8988bViIxvmLD

M5dzSDn6+76fy4Bwi/NSfGUGMfK5quBGydQ5fvkQWq2mG+Ek49h7qMU8woS6lHBbPkF9pCiSQNsz9/XV

V2yaLCSJUu20WpEL2sR31W5RmNDp5aZjOndaG/lfLc
eH4WX/ZOvMuJdDys73ODDJK/P9HSEgwjc6UYEz4uOK4aojF1KgBg9DV1kg40FsZhDEeiF6nTSNRxRc4L

qjNJ6wx4N1rOXoeXH3m9U8x75FyJFYzGmUvJWviBcbvzd+Cl/3yuAmcnf028J2/z1mpobKjKCk5NSER9

XrdFXtZAB74hdZHZGzSJXwEUxHHpmpz1TejGFqLQCE
6ILkHSU0Nwe5MMT0NzFxFD1tA/4m50ZOlXBLoP9bu0UAlTJld1O0oyN/9jiScq9U6KWFhvFH7tw7FAOC

Nkn494GdGHYWw82D3DxQzXg/x5er2oTE7I9WLIpAv9lGBMrAjKicxB+qL13lA4VVETgn4gVkOOQkwYV/

PmXpVffmfINK3XS6u3E5GMs+hrP8Mm32lMF07JEJOj
r0vxzzuc2Pyif4Q3RfAw/04wfHMtrHAn/LZD9VrZuybAQ4QZCerTTSgF6xdtnKf2gSkpvAlP2nc/If7q

xjQyWQNO7B8DmMc0P3NwwJJ7IgCDTTSDwEAeYeCTniKfO8nnv+Y8KlHeFHdzR0UH4P1Q91FpYdN06Kmi

3DP/opAlnyEgBi8ejbkixfV++uXuLh3xbEnqHnQIHM
Ka44sBhM6YKT8Cb2ZEyw/R+czK1ISyRVQGCmIXb4i5PzL9UoIrSPWqPoHTC5iv6qtSU5V789xbn7pa8Y

JFRmUHykg9OWwgn+5/MDgDOHYSA8OhpDb17txHRRZajEMiyUUHvI2a4zZAeopBvEXWw2c4NDJh9iVtKK

vadzkHkf9J5CA/IhiYTmraKAD0pZJV0tOS96j1fuPK
WKqLJ0W7i27WJ0tpM4sd+GnXC4CLY1D4iXJ/Sw2uKV6WmzvTl53y0eanROQusfGN7YkRhSSHN6eq3tVM

B5sBxDwTzIKtiEzai5UrsUV0uSmiWXgb+BV/zo2/mE9X74oJWMEknFr0fSaSoHtvjG82uKETFXjT6KG6

CcCtAk4k8LtmpTN/iZeS5SAymXv9ZjAiyuJTRrqng2
beTbZ+oVdXqnCtF9VloIvf+j6XRXxqzsSUw+ghY07sPfL7X5K2XqJW7NsrQ3J2UFBEBLaF19aaHwnjQI

YYauKUCh3st2ZBQj3bHObmXmpwxdQYvVtKuKWDXYqXDoNJBwkAZr7RvXeHdmxp8WTdKt4D6fhFbpjIz3

Q/Zn4vL+d3GKRiG2wYG64yixL6nv/clGptiCJxAGwy
1/W65jeouBlBw9rVdki+9jiYTNFZlXC0uhNZ4BkVglsyQZTByVklK0EOlaxTPplB5A2LsC/Eq/f5+Ru9

AZOEw6gyNZI66qE2VwIcovbJPSQI0JzBWW8kUddNfhfG3uhxoK7td3S6EI5HYW2LykiSrmxA+Tdvy3m5

fx2/OU8qaNPZmdopY5Syt+hFf4sHFOYKjsTXq23Too
zi/boK4Thfl6xZAvMYRwAzINO5O+OsQ3JzK8tlqb4HdwknIZ+nWrEvnZos+6UFJ7zv5lAwmwJWSILb+s

YdwwK0L3VsgqbzD0sux3lyUX9BExj+/L+uwdUsTwcWh1uN9fMhXeB6EQupWdi6H8/Q8WfWowqSd1wAE5

k1f7fX8A3QYxcObmvpsRNDfc8FQrcLjwVdhMl6MggU
Cq1xJNpmb13zX/AX5cZ870vqMWR4t2YCjIq06tVgZ4T+7feU0lFiUfKZjfpUUYC7BTTNI9zgUlINFpVR

KimFbvd54afCF/oPznEHOv8Ea7dpSwTTydcOVMu6Xc3dRPtL1kI38G4dhnNEm5tzHfomdX56FSVLXWAN

XufqdttmNFjPKDdgS0LEtQ/xsiFhnF6P8NxtURhWYQ
bx1TGR0qesbCejjNhY/0980j0gzl/TcZ6BGGX9l0cOo3LnkiG/Opsm/+gVMqr5lA/AxxFcCJFFBLNk0B

ytbxCFpnAt5bWjtEZtPvEYzoKGali8y0D2uz7X8knUGf+pPkyW+h6HTYUfJ2Q3d6FSXW/b6LkBfSbI/G

YdLh4G5RnaUtEmZ2NqFf9Z5PI9kqci62srkgdWUz/O
RnujvfW4zQNGLxRQXRNklkzdB/L05j4bZ95RWZl++IO18f4c0sZyb+7Uelm8B8f9PTq2vVP5udnjfpc8

5Nyg10jY0OlfBIftmlm2xQXzuS1I6bzFkA5awvGsAY2TFzNvELaqooB4HG4tIcM16JKT5dmqEclDeg6U

scQFJOXF94nOcZCQdFQ8BW5/eXHtzVzrQ7/aIcMAEE
l/m0fypCZdP9nOcVGClRkzhAQY8YHeMvzM7zydRNUP4ukyypyynYFll6zGYbmgbOAapjB9exWBtAW6q9

8KqheUYyt0KLMJArYKBPHt/pAQfXom0R5pUZf0y5txkVECjG7EtQsNnBcLy3CBpYFg/E9qUOJiHsYIZ9

H7B2o/rv9P/NiLimJLWpX8abiwjaN6Hj/CI1xPiJ0g
+AYkzX4Wro8wbsADq7cVU6dkpGYkgAxEgmeO4TkPWam7r/gEGlQlc6mbf2FxB2Y/+xL2EzTHGjucbxZZ

VJr19Gz5zLk5vdlXRbzlgxeJaOUvsSNajX32v4Na4MuovwOhng8tDtvz5RIQ0h0ZGXLcbBLBG0taCFJa

e4XLerD34d2+0M8mE+OAeOPU7V+cUEee1ZocVmcMZd
C98YddJBXomkMg0n3wBEA3IehHV+leX0ENvI4EdTebwaOe41wwcLkn6GB34qsjayKC5gSY+mBAF7YK4W

kXvjH9gYigLS6xOsuNyQghqrD5mJqp/FuNnyrsvKGsnDLQkPQWaM57mT5kNsdKdgnpdTBELCMhUiE33t

YZ14KOurfOqM4PSecxgnxwSutwC17aYMbhn8soesYe
5WVgg4vdTxrcuNfBA2hQTzszVP/rWHYc2LEPU49808PAfBNTYLO88Jmcpip4ISZ9vlsOLkiwyTLqAmTW

1DPg/zKvC0bKeFQzenvWENtvAwV9rnJTDHnWFb3A7zaWoVRukCrzw3090WkNvoDyKEIhbMRU+L9pN1XI

5piTrubDaUxxmLsDW8LybxuZxwbyyoPh+AQyV0Mz83
6v6njGIkLyZ1ShsOJ4f9QX80D53UskvBpR9iAyFrUU/8LHkiXTL/O1N74Ffg9rJdjutgCGngEvqhl1/D

xN+2bqrGrag4yw3P1cc8+q8ZMKua9F9zVoPEamNJBZ8vEDU6dDaK9vecMhHoAjm3L+jZe5/lMKOdpszF

by7KpF1mTWC4E6xQjXiM4RMIP7DFV7RctraAqtOcPX
O6reY4Yz/KgZKMLw/PUkuK65NO7j/Qn6cJtu7EcaqxqbTR4g/MzfDCSB2n+2i+Jc2oJIhPuY0X6vpv7J

dqvEsD/jVjCJ7/pK3Z3yoxVmHQGvNwXuJ7YsahI5GWftMPQaLzP/oW4Tgw+UOOoaEBLWNvfvy4+1OX+C

jBVOxb7k0ysPZmc0WiRzS5brGGZXHBHL5AZo9GnaRD
dqHfoIAEX0t5VQjsvyXafUy9zdvQtARmRI2ey9WHsDjSw//pd4iu8DKGF+a6RL2ePPlglGwQeqNzVyDb

ZQvHpHV7zfbSUhKRVHL35onz0mwT6sAdeLdpzb90kaMHHVGtJXiGuMuMJf1a7vP4/Rl3UpCCal7BIgwS

u4z71HRGmJM3WtU+3S7YhLwc8UbvPg0c7iMmR3HoIb
8oOC7DVi7omhtgkmQATLNNDU5MGTlC16w5suTmCcEqhqvWkdwAxUWsrU+k8ZKafKX6sOCDSujMYducA3

3Qy3JualThG/JZLtBNYR6iTQ88zrxN+rpMlTHbeHDvamyrAv4wGquyrs2WUFu60r1Yte/wmIBMvczXGy

FLELSstQ5FYWByWN3mRtDaVKLQB6prW+ISGDsBkfDu
3tNzoJXZhbfbfqZa8OPTiWVa9cKb0UfdNmpz1sLjhjM8knX+109+o2moBMF4J0F/eucP6lxyTuniJZbF

REG4asfwwUd/QakTcKCQvIhzJI9gxoScPZ2UfAxBzsVJKFK8BwmJ0KxDI4vXAUS/u14xijX0CUyhxjZS

kq597XcyLKi4nPIaAjJi8N0OLS6f+lt1eXONusvhCu
PH85v2XgAebWh4VC+hL+ztVFFUWU0j8Dn4R5gIqS9bC3M2Em2IqasSlUE0D+oZpmMaODWz/0DLBkvuF/

tQJOu6Tg8ohMNw5jlE6D+b0JyC0FskmfFiUMG2AfRSHusRY+6WRIZeBX7rrYG6a2wLWAxd5rJ+EtvJyG

C3WKl0xk9cP2Uhm3ZMqq2KqDkGezAWWa9VoA/h5RUC
+Dtx1LPXzk7HoZkn1bfAHBUvlPFxV7qO3Dod0d2o6EIi7O4EcmqAZhRBsqJZ9p8BFdHDACqIJROsgOI9

1ebh5jamfQLT5nWOy4/FaS7jQYXHGzuOl38iAGn46vr0aIOr23bILfRnkjAOOHXm7FlNxm4zdXWsNATj

gOJN1COkSHaG8HOcJ6CXr+HeYwWqO3WUhPTRNn+1K8
R7yqaDXZfbxDC5kmLf4tR8rZVew+wx5P2P6VAirvkF11JvwV3aEph96EPy3RlnLGT/MXm/+vbmyYrK3D

TNwvk+g5cZmVpdy/YD2Oo8yt9ulepqzsLqC7LLRo5AUIUVt3PpoKit/ZeZs+Zhz5eJoIQQWFYzl2eQxd

pOzsejoQTxG2Ipa6d1bh4HF/fb5H7bMS1fz6WQYgbX
3Y3KNNWsqaV3BIkShlP7K1t56EsmdhwGOv6Lohg1eJqKUPrPAmMYPHp2uTuevhHKNQmgdUG/rfuTW+9l

uqdEVO2nzgbF5a/UVKhk2Vhq2WwAWMiuH3BwTVGwtkHJt2+JflOovgcJXCDi2zyYwdQFwX/UIj0YvII+

P6drmRp0H/n2LfGprNwoGvReFp9K1MPkPZ33kZMeC2
Arc4H/1hX1AFVyAOynvP0m5PHddZO/FwfMRD4XMKJ2dVIP7CIaJa2X9YeAX+BZXFtuMJk11eeqRIZ++f

7lbkl9vvDkHRbFW3MfyT5kAk8KO4qx2RTuzNgdi64NForsvnZI3lXn7vZ4gGb3G2B4Z51Vwf6Z6zjEP2

CQVDwaJI47Jco1J7tKva9GK+UeNe1BzeqaW6JxNTag
FQArgdzRMuZxBBQNwQ6xJCz7Iy+knam/BhYhd4GVipswTA1xZqI2UQYxV6c3Dmedl2chVaNVN4SUkKvo

FA5T/Xbfx/HOw0CYc9CowgjNmybzehQoOZIvROSd8b30EukKc4Ag3U5EzChORRXYIput9CIhBq1ZJsv2

YzOcxK3A4ndA5OqtTpRMsgWCMz9aO/hmozcmMkVAhb
xJ8HRByhH5hfXy+OgWxOHiz+lLP/8tpBNtnpoRS4BdMg3hA6zceGySAdvg8N3fmrHRs5DBE+5AHcZ1qu

dDiHnK9OqeHM1QSo1Udaa2K6Cii+uvtaDMmuLHfut+6Ad/eyC/BGSq41SjqC8MfWFmjd/TjSd8/A8nAb

jNZByS3zWmGlm2jDvmyRac8LXVSHsihZPmoC4l6SMS
LNpmAn+wLSbxsiv8d4Sn9TVDxQBXuC8VJsaxpaTAZlh6BBNunr3ocVjC3AaCyzw/hoKKLW9WWmORPGIV

VnL7UWGt72ao4ztb00C+eVKUWyL72mWfsb94MHgF+WhwTU9U4X5yhjaoScTo1zrjkIkm2+D3SfXKyGQ2

oXVEHDypG1PTGF1FdrQ19ayRv5kR8tQ63iJ+RlAgJq
IPoVyQymc42C0fUp9SO2vGiAbmmqZzO4Nc1xCYFWtLxdPCvvxDOle4P+E5bw3+9I4DD0iPD59sDmy+5x

bUXEulvSQUN20VdVNjMJ96aoqsMoMnIl7n93kSOJ7Cumf78FQcdwWMWazLyrFN8zPH+tUwinqmBICG69

NjH8vyEOl3Bc4RQKsC2KdqkvhBmR3fWXtn3zDPSa9J
hezgeCsKwfVO98f2LZJzwEoBogIWMpH0keKlg/+9Ybu9ZVdtC58Nmxmjvk18o/ktKuegl6E8JGCR92y0

SMl8KdmmBhlVapv7a4bIFUjVvNQv30JXySvERDaXbMoKvfUrmk2r+ZWalWWc3kv3jnb6xjU8+JABg9QU

KZXTkYCL+9JgeB91740c6xZUgoUU23zPK6pwPrd2XA
yZcroXPnUuaJ5Z5yPw0higQdnnjbNj6kvW01olkiy1K6RLoIYJ99KUaUlZgp4b7Exw7HsniqK2ZGVEQR

n4OVITp+MIqaA3k3+nkk2o4auaRDI+zfX7IQqsymxg5GRndhEjd1M/+F7FJIfrjG1GHIQqMXpwDy6nLp

9+QN6CbgUbJou6ZyEMRWivdDS4AJA2yw7QtP93liMY
+4NP2Fq60b2LmbMoweEcvEi4j2gotFkj0ode2gp/7q89S82EIrqI1pc/43BzvMuDFam0nPvAVLUKRt9A

LcqnKQsiI3FalfpW3XCg65quoppWxkosKTzML/kVzhMGhV1dY3WsNDZYX443DyeDa3LvziFa1lRCFmhZ

SIFrRNqeWSX37zxu0+oVY4uta14touS+kvdy13etrD
ci+IonfSRC329JAPLfogYsLOkXoTL1lSaz0vn5nQ7795QGx4M7B0kowCwGGJPCcyjhZOVU76jAZvTWmA

tmq4AC810NoUU67tgGwChS7sZ4YVQHsvC6Yf0hWme8LPhifQuFth12dDx0POyql+gaoXv2dsWArWUL4y

VILX5GGTbeVQyDiLlic0J2WWtoeZqVQwxR+DNJhWMC
3mCi8EjY/EUlnTbLV+WP308Ib7NwI5QrR7espIsAZgMEx1ciChmg/ukckaG/KUivPIOGtApjBNRT4HSc

JhfJ+wyhYUkTfsjbu/PXucLymfXblcFQZvUNpzZG0ZnoC+dprsP2D77lMHrTjVJ+nJwJNdRVVA9mnFz2

qqCjlatfvFSORmOW9w8rT59dYeupGjWqGXsaNImQAw
FdZ7WbWCVzKdT1jwh6JHQ5orfmONzeJFAKT+OVa4dQj2ZZeAxefmNqRNP3e8Ob12XiOp2UxJWcH2zx9s

UqqS50xYcc0TIN6kLzroUn2fKIqmgh6mSi1suzQnnNcFaQRHdGkYEWhmAouSMMOAMBa5ZZGkRbycQdRy

vTovI6s+0l5qKCJI4k2ApnO5b38kbpDxrvDMl2TZ41
ueFxNwZxvciCARwh3dRzMXagN9EqcdiKwPYfFWxY/PpaWONWpxznCqL/kiLlHe4zk7TQj5bFw7W3LojM

2v5/UeZJ/Fu3uKYMp1SX7y1qhchxmirT1OKxJ8c0WFHJGxa1f53EvB51Go263OP8k2SUQ0RmbsAHwpym

OUcSpUjmGBhk8Z1EyABALeRGPmHAam2YLW5PTsPTj2
R8n8VT68fQ4gjnwL5+jl5MLbUog1fqvO46tufpUCbC7VuLPFVcrCxeBCMkoH5CqomPEknjPk3Yfo+6Pn

C3aQOMp5xZjNtSP+E7k7nz32Q4LlYI+mwauQ2jl9NLNGTb5okj0K2AwA5UepMdw5al7+U84rlA5Wh03h

14ja5XDKERKcq8Vj3Qrcfc4lUZtaCOimchoky8C/S1
xwaXRxYDVB1j4M1Wwl/GEmm3odsUTZMLThUKxDnsHpfJp/fkEnIdAwSNyqxXCmCIC3KCE/uGSuTmHPMZ

OM5WTWspt9ZKOoCXKeuN5Sfws9ZKsJWs2u1bLj+zkiUZ/imN33dALqQ79vsEpBjcfPInmQVFudHTDpF9

3SEG1EjLM+HCb8+7Fw7yAFc8h87Adn+0/0FDV70OEW
ilLRG0TPTjbcygS3aQ0I/Lg2jd7o21HYdTmMjHGJw8dcJZ3smNsff68Khapm9NrDs9TGKy2jYgzpTEGe

xIyR0CRg5PzOelHTH67qzT7Zm7n6OvwpZbyMqGANWTpJ9CPM30Dozi04pFs6g0h4wtOSdnsA9hcYegBa

tGbKuVsvHF10g1P9295bSrBvQwAY8Yf0Clw5lyh+kA
orZl7MCaE0l9zck/c4RGtPOmsAjCXkv7Veke+agO1oH3ReCI6mx6X9D6vZYpmyFuzIMK73s2No0V7Q6j

a8RZ7488mE6ZeTOx12jNQUaVoXsvCrWZjglL1OXg1yJOxSaZaDmHm9+3jhdYxa5buEgYfYMZbFKvEwg/

vNrmzcu1lkmDR8YBu25rLdYlwiJPbLnAKxgCQrA9qn
UO6aX3duRTs3rpLeI5H48JLvv4akKYnolcuh7HGLgwBcV5MFu5MhCCmwK+gzCN9vU5FEfMfBuS2xpaEI

d6aQufgNNik3KyV066UCrUqcAaof8ZSKdx7I4k8tihTDZJ54PbzjBcwPRXZRZmQpqNakikiugiIA2cIF

2XhMJviirBEy4HNoam5hMrX5jShH4LUTqlpzUAOKIf
wkTdKG3nIl+9FCTVdq3wyxFsgX94hVRg9RnGTCr6puAhjHdDomUA8Fihq0r4hrnuBagsXN12itsN+tJX

NqOqgEjZZivR0n2tqyXPBcM6NaitaLyGAd/46KHgO8BhjM/QZYiq/mcTItLVqxkdG6sbU5yNZF7yH53f

nKzyjf9/qntQEEze3XwkZuwGKgw6VTJQBKiav2VvoC
yW7WIDCn0UTs/k6WhIyCaha+/9kgymewNCd04eKLUBLEwVVVYtK0q41w71JLlh8SHN+SBNIbyJz/qF6F

kZFsZpWYIBaJkiAm6c0mFZ47mIsRy5D/9LdPkefVWE5iz1RqRvcA8CHD+/VgVunun+4sMN2Yc1pEHZoE

oA1Z8bGhT6sLijRywtB/JvgJzlB4uQIFTZDaYYE/6d
OCLrbkmWlrgm+Xg/FFq/4IZCGt2cXfvdBXp+DRXpJa1h0P5QXA05dXVJM9t9lSlEy/a2xS/NCQJkx9Vn

hiNR/TTu+eMi0yufpSVz7fuekKw91Rg0kBkwnYzFJNRmlOt6iEkXirTlzbgKXXAYvYEb4JxRkr3XqzMl

kUswf3cMU2Aogw7canB7EvWRYTmRFOE6NXM5A0RMDI
RWR6umhKLeqDu3ZqPnxHHL4PqsOCnJgq2lBw+Ilyxo9Yn8zqgTemoP2SfAHbz+qiNIPU9kOR3lfxDwKr

OSxxCuZAbnXeIMnZZlVP/Qc45Sizq19qa6LrNqmEi/PRlfISMHCjac55GQJ67NuQse9OjPSs3tpf9F2S

syPBNqVSYtL7oy0+LzrC5rRYLq5Zh7wTvC+pnaEO/F
4Q0o1YLVp1hXDF7N6MlB/IDwxexHkip2tDkvCrq9/ML/2B5x09u1fh/1ziVpu5bNU+X0qmfv3BU7Hz2B

rlh27FrWcaju0/NGPOQRvgfFBoEvmZqAGJwoHb3AIG1jZD0w6c+rAYLstSHLs3uCBL9U2d/4/oO4oVh9

DZdu1nMOExMk8rV3m3yFiemMyu2fiz97bmM+WHGbX6
2yXmL0T4LW7/EPAglmkxdQFmx5jdRvrR+eOKS2qJ0izQjagN8i43dFZXxm/lO4MPw8mPtnc1HQR4gWoK

6eT1mvjBrX9C8Z2BdRGhGi0+CtkOBVgQSM7SNLRLor7FVU3kvtWYUDi/O96gCZ2g+E40Fb3DTLX70/PA

guqNj1VmkdPIyTu6o2J7T5kBKvXyhMy0e2G0g9RONX
y7D6ZtKKEWJEXVHfITQ9B9CwSY6eoLRTn25o8Wk/3UD9Uxx3oEwXSMNuDG5UWKzbIy9BYR9k/Kr36gHi

Aco103rw1T0kB+UpAOpBX+jjxQ+iBCV8zqczAleUUPgK/jPG6EOrOVZSCdH1VdQs28/VBWuCQzJ+OHOB

FPjheAE+8xnW20jwCoPbkuliley/rLrdvxLSE86hpK
4FynSEcyOPCaGnSM9YflEn1fSlo+slbcNU5XPKwLdoz45224cnkniVt7cR0c0Ni1j4YqmCgcuTuOKSu5

8SnRctay9/zM8ZJN1rtVPkP/D9uTYLie25jNvyCanA4QJ75YvENvdyDmW8q6ZOM7/CxeF2rHdbUcvIAx

EJVjpLfWrTfnlSIMoOnrHr2jRuU/BjOvmWs5FI1IXt
nZy+Es/FjZcbc6wyz5/b6E27Yq5twVcS+93jM15RKb4PqviE3p/CtfhNF2lJlRhv9Vjr8N5x+THsFo+7

/utpwER0iiNsGCdtvU/p2yfYjjEX9FI1Fsbyq6JVaD6Hpd+EAFp3jpPMMLAu7jSt9FT3XOynjsEHgXZ4

brxShcdE7/Y0YeeRn641+tTs/O2K6BpK3b5Zy+oEWJ
91l+w9f/9m7CJil/HyV9QPjkY46D4agNQs6YGVElUMxvcUZ05sQLleA5ocIslvhU5b+CGiHldPrqJvzn

sUEVU3N3bQgA1lN+V1AY59yG1jkRR5fM7NQv8RR27r33XSDTwQ60803xjG0DlbRspp8t6kums6m6FEs4

mRI9UJi+L8BM0EXIkz2/qkZXlUGHyJ54ABXJ5YKiy0
/1fSv+4KRecPT5NUuDH4y+jp03iX1WwAq3J63cKexfV5wtOb1HbwrHXlCJ0x947jrCh93lxwPH8FJD5N

Z28WXSyiH6zcYiMXKBvG4FTYt26sSuIlMVsxsvDsJZ125ulNIhhOjPrMusMhq3NlKboxmbUniqf48fFl

LhFMhePXzb5O37xU65iYaEU8cNqOeXQkG4hleG7Ceb
p3/qPxCZOhT/Y34sX3RLt5HL5/CjE6OZvuyT9jo7zoBqgEqs5gEKjb3mNXK5RR6gKKsWFNqxcyCkYixg

7sOH1G/ovjSLuvoZgjyllodDL/t7m190CNf9+nbV8+gCtrO2qhb1Ow8Tcu2chgMzXldf1aIusESOzD7h

558eY3+cys43tr2/pDreQMCeqWEH9SGFN4teUZq/+F
2ZaHICVQCUSUtz46rP1069DrPkMAylABuTnqi/RfjFeFwrs9RgWpflemOzZFUbBy/WAEMShguX7aGYa6

4ljumS7E5L5G1eBL0KY2tSRG1mxlh9Dhi+I1HCevTi75RjDzD+NC66BVVKcUI1Pnsc1APSMGy2r+TuZE

zKi56Nii93/Zqj+jnLRGgm7gCfJRI3EP6W34RmmOtp
1wwY9fTiHfQafueczxxrcBNZn+Gf+ddQsxU2fcj4TVGyDEdDWqIaBbBudUmBWBiH8OhZfcxTjEOBHl6m

z8oVA4rR8jv7oyoFe+FzNPFqW+q6XJ0u7WsfDU5gZwnZm+391ZwdmpqDFlfUJ5b/UVkTTb5ijy+h+S10

Ot7C4ZKJnpYdrq7GLymK3V8VgTg21Usw/ep3nG1XJy
0C+IsogoByCfevs2m4xaXmQ2liIhzflCVkl43gwilGZ+5hDR02QE8MmaYgrisDXqDfRxQOLVznDMHcDP

WSNT1wAa56xLN1dtK5lM7EMY9pao7F+M9wXH5F4jAtch1Z0vtduCLpwfs/RfOvc+Gct3d7FhqFtzxE7g

ItuXLzlsdcYflCvNv21CR/LwXBjm/dUbXLfEmkl8Bs
fvKirgmYzbbZBpLqlRHYyTiwxxaxryk0W6/xcvbFQHDI5MNQiE90tR0DXMPuLHUMrHHGu1wVqUuR7JtL

WmyUWySTQb7PNWft55p7xBwbe6W3WmTzYP+ZXRb7QHLJsmqBOSLRoeY9UsJe7GTBz/ItbsdCPQYusxOL

xh5+axJN9mJoYQjlkEK7diFlMCay7CsXYcQFx5jI87
WnSvZgyMpUEDgZ/Sd5kgfBT4TVHbNT3Glk00TqQX1NiV7CqkMx8upisKUBiPUCPnn4p4DlTSwZVjxjJt

PMT48RN0WuuAAGtzblZXZwmTfgPJwQMcrbgZcxOJPsZqIQRsBJNvnS/ark8hEdoFNMPToaWaGCT9gIgp

Ugal6LqE3ovXR1zV58ZTGMehAGCD1/UBSX3Imlv0JT
c8UNg5BstkvE+9xlh/dTAaMBl33wSO7M94m9e4Wk8rUXMAiDfTVQBDrAffRjzUjfxCMLSI+fOsEWCCrx

tgFicnWmrz9NJbAwzY13H3bt2Z4XXGMim5U5XHzGaxwJmHtAXcW6fQx/Wk0PW0VhN5L3A/5x4J2RkrmO

y+OL3rvPm9aFstOWf1ET4PX4wPFClnlChyve8ruHUa
yPg8iZMM8XBqL7hNrGBttw/YsrgnZBi3ekJr1gBqbxGDJKe+Gu3n2NwdgICGcjsXwiPYoGblVi6W1wo/

g6HtlWKz8f6f6cfji71pVA/zuT2Ye+0ZaPIzhT2FD2+88enI0XGgmGe/26RCVXoVVdpBKaceFBoto2eY

bn1e301zZF4LyH5/Ynin5I3vDzs45PReCX/3hRz2Ze
Dv/OpWNsUKC5Pn9FxJjCI1XtohWCp5gxlbVYKuePZxeSUJgZKmr6rsqRAZnQznzs07MffN7xvukIJlDX

p2iU4nYLACvDOdq13DmNAHWQhPickpCjcLLwB8JSDHX67i9HjIeke5s+U2kq1I5x1NUxmBEfzthsMsHF

14liOzZ6FXx7Gbe372fFNsVC9M4jM+EphSn5gRbc/q
aA5SDkjxB8lYDXVyEjLlHCPIof1LkI/1RymHVUKJLHKW/htywrU0cqo7RXD75VmXb/MrT2b88PL8++fk

6GcUCeITfRe6R5UlvybI4dt+bKbdid6F7rfdvvE7elcwq81YJZSeoNYW3hI3FBpMnyemKlKtYKu1nmmR

ATNQo9JT/wSuD1Od9lXjWpz3mrunYHihO+OZeQ/wnA
u+nFJTG4GMldgU/k13vmwp1VGrkSqWmTvi7zRBr0V4A+aTar2UFc5MeEw6v/GdjzgFtKsxPMvyTpEk7Q

aPgMhyJa0hsM8HB930KQiMTC88cSWklUl+NSmRs8P1IK6CgvB4pkqLmNCxZjopLiY4VHwB8CbKyXwm/T

6A0HVT4iYW68kxdZNSCLe3fbNyNuwAuTWAdyH/DEh5
C728r/qxxpqCrqWuZBn3SvnOXQbmwQMMULk6VvuLNd7FYe3Bx7zzjvKncwOcso77QQJuhy4vhFyHIuDU

tXZi5yLsj/E6Aa0nt5+/pSL4M+PcL+3ym2b0AhbpvhLs23kCGKS+zwSngtzUbVsIYcA84d56teniNAWJ

OPH/0/3E1XVLxvTChyurcyQv5C+fTi0IzUFZv5I4uF
TYNgBgPGMn07SL5EIciWvvtdETR+6B4LcofmVbWbfzG1Ta2FZEeXJkjVoECuXI1dJ0mnxkDyMc+ofO7O

29yD2G29Ks8MVuE/q+cRkzDGcZ045yGan/r3IY41Ugw2QB1duXalfb9ZwpB4nhadtd3SEYK+VhwqD6f9

GPJ1hjOtXY2aX3VKtc3axQ7B5xMTmvuZFF2ekIbQ56
j13jm/8v6GVSwngREvaGfso1C5hHv+8wCb92FylXpBOtJd6O+eVgGJM50XelaAW5z8fT3JNX1L54Yg9V

SCGLe5mcrXIxzfvd73Nt6oR1BjHhrmu1ae9r+hzfXGtNlfD4OFnry/Bei0/fsE3TPqplnO63HBwytjOq

31g2utSmckmz/y/eTnNPMOkvECSUuR597wldmlZdSF
LY1ko+JUoc/oUTh0qLFojoQ9/BYNKqvc8XQd4PDkqkOtbFUeNWdh9ckUQS8A+9YJwhq+/WfXqy5mekXK

FrK3VufECu+mRrKroi719hEkttXJi6to8Vvwa+Bo3/jl3hnMGnB150OFGj5V/g1Wkt38UYvxSlgSLGmF

pdG3X7r27O2nvPxYfhUMlzzlin+i6B2LjDmfIXF95z
uqzWaaV3PejQ4no7x+Djtw8GSekd60OMl1GNMovKk0GsG3PuRYAgDE+TNBddxBcvJe/gLuUYYP1mF1zd

3pq0f+HwYlx6YmGgJcldUn9dxJPHLYL/fwogbY/cjcV689yeKJ2VNTGbJo/RUihd5aShW3ZuMfRD5dFW

53SlH1jJ0Mu9wm3pjWv6szwykvf3d73pnJfENIBPDp
thl1QJpIW1HkP0IgH1T14tQWdiNzM0F99lntsm8dsHvtVKl1CHLNzenApTv5vbtTPgwutGRVqRn2Yjzn

N45p3z1SydnqUiD/eJdOUfPzPzf1HZZiDhZsxILd1/MumvOBnvswq3CiYC3oVj41+WuGoKn88zEilBPh

7m8n1y1FVkaZxeO39K3Y4oSIDbTaadyqxxpA2/qycS
U+5KeRWTOWa3Z8uIzw4BstjXKVWG9R3LcWyOnOtsdNOUdSuXiEIZCHwEUMDCcfQbwhsKoz6u4cyB3VF2

LPKpg3Gv80k7wf8UT1TjDyzDE+EpIbMOEQKoLU6fbd8jAB4ZR5w7AnEh5HmGF/3UGR2dRX6KxRRynfGf

4o4uCO/KT1ts6DdLDCafu4J3dr7sgpAvguHrsblioc
e2GIaOBm4ppJQJsHHhlI663rMnSrgNXJqVWxubF1JkiT+EGEFmyACOHCr3ExnCrvaLb6Yg57UDQHzLFb

ihIhs4Wivelwo578rbil9oj7krXD65p652jm0E346/ux88PsijJk6jYwh/Or7UrjQ1n2Hz1mfxRouryU

kR1I8Xfhwp4eTlWGiCcc8S0QK2/Jgm8LwMKET/s0lv
7UXh8d0pGI4Q0Rn5RRXef+KjsEwxjREK/7ACxH/oRfcJN6a8CbJ+PneXPvDMUzs7pWg1Omdf22k1yrIF

3gXi8xfl/r14tqaTlaLBZ9rgjZ3WXyIoMC5D69WUuUSqkNWF9KhF/tJGQsra9CMq9JzfvVTUlqEUphvh

JebVRTUxzRHSZRrXtkuuX7qY+oTr9oLEIHUGcUeS6N
yMv71C7eIdjoqLoJIfr2Au/kpvOsRtwiKZ3pRfbzgtfsBAKMgG1ne/lexp/e2+T3b+QrRzQz1aIrUmjH

S/0I1cZwgt70VH+2hHwp8EoVwF7pVl8NRH9/q/xR2vBxrqAfwqX0FzjwM2MsIogGvp4DGzr8rGWsDLOQ

uW60fx52fGg5t6LgLTQZKBSUM1L/EYz3hCwfMdVUVK
FtW5X6MH/ARAEfHcheJOmWmi5Itz2UErHiz34DvJvldTvzkOjXzEpuUqAw2nf7XtdhWx1MsXnj63ZDHz

s4mk2OmkRYksOHFVNY8FUyGtgg4YnB7Bnq0bA251uXzDa3RYkGJ1dLwUl9UrZNLQP1jV4/UDkI08mJDo

+ZBMiRHT/B31Fe1aYdzqhjK5jIkJ/ps28dMNGxiXxy
ehrb24hu9BEXRrIRj3Iq3IPhVurIgBRv52U2FKvx/b8uEqZHWvVczGg6Wpq5nfVPzKdkMjsGXb6aP86w

VYZ1hIgcX9uSLx0jhsISuUiEZBpkH8sz5FWkBFX9aIbcAc/+JeEENzN8/xYQOa+M/mferwAWgi8oOkCZ

URqZ1cZ1e4/jLFalUiTc00lBVtUhjcy6NRDWtcDOG+
5Ijsrvcnp42FjsxJwyoTlo9UXblEVUgt4/KmV+DtKaaxti7KDMfVSVsjQjh7oAj4Gq/N04mL/iSWvtjE

TDDthcKOAwIgOeWG7+wTdNKzw+VRg2/XZFu41JNq3ulyuIKUoIGCFBCTfYW2v4ze2waHo6sBa0bsu38C

NccbdYubHEerS0eIt+dbear6L0e8BHIMSt4TMbduQa
lHIrbpvg09T/CgE2ZgVeTUMBxOpxlq4bGjMRWSNzowg9Qo7iTmwDeibcCR/nW1ZHbi5oojaZbEWnFdWo

bv2cDdPBNF8Zd7KcxlV8wqVI9uQ9o465+6w3W+ObfcvKLm4FOD3h6cAVYkJENbi3NpchrMH08uiN/Aiz

SwbpkwoywpTfxtfLaqCuKsmGk7VQz87j9w2IV6OZdW
anSV+lyFLXtVX0ZZsngoeCDRvUN47IkSxu/gDbMXEvCC4JABM9UZI646U3ZHrxwIpKQBwQU+z55BGND6

guNtbD9OwjzRkwSrwiCP+pDPUMFAj16ni7p+UsxcHkIS1WdWpdpedyeUOxN1p3x87BsEt0cyuVovIDP+

gYvpD7Mdh6ywQ8vIWoIzjY1MyaVHWFe+Ape0kqQGv8
73W+S08FeuRuZdUwUpcxoFYq5uPKLzbwLwrbdVdWVSXpUtuS068kbZw/QUnT1rx1KASTH0HtviouadO6

WjI5ScjxYg4SDQh9yTtCLeRCC1tx4DO9AskqQPhUWVOlsGffq/JGN2PLazI6ZJyduD9Pc151MrTdZ2AJ

qiiPzQtg1U5PffXVeqz3qcQ3nyjoYIubfqWl/76UtX
RhmDhrRQzxDskUM2g6FwNOTddyN2cjo9+167H67oY3S31+uWEDAbozGWcM8wWyfGMvI1I9PENU36uY6l

gOp7YGFgu45wbkuuxwzBp3rMFUmX2Weyu36WDchPskx/sjw8HnNi3yVBm/Urod1EM4plvbRnMT2feejl

XeOOW9BcE4iITMxj+t2eUQx/iHoVKyIzeF3eVC9aqh
W+B1Z5ywq6aic9Qgc53qtBp2vWxozihLD//bgZ7W6JsK+GJegAiO+Y513BjZpqTpNmCsCqdFUleSDQ

QtoecMG4LJ403n8lWZinMQywgapF7khPV6L1y2dwRQFNWRoRFHjFOOtcq/JBqJ6YCT9BNPQDXToRfffU

VdtBwRbt6DM43xK87GkTMtIdWZyrYFNX6+gc7RvGt4
aWFr7AXbwwyGWirB9yxbHsLfe570K7iSVObQmgZl/EFXf3Ewq9aQ9fafcturni8lOWja3OTXZfGB42jX

v2hWmuIYFJB/4akGMqKFAnf/jyV2Pntr7tXi1OiZK8UKfZdyjsEBEXft2LXoWukwKRT7jfp7JFq8rWkv

38KfyjoA/qI0TA7MKX4r/SWmSCo9U0Wy5kM0Vy6WE7
rwH/zwv9D2s4ysB4yNrQHramPS3bbz6HP23Y0XavMnSaqj11TirkcHGEkmQjLaq3LzYn4xRP9HZV1Ilu

5kzSal6qcV3MKPO7hxmoPna/fKfbYgz/7osdReiRKQHi56sKjPo1Ad2DPKC3CNrvNA24kUbV0Neqt2Bu

/zQ/hh/w/2u0lw/y2OSK8I0GCk03+PLwU5INszWnzZ
27gy2L9N/JuyUC2aXSUTx1B3WluFE6BDamBgezBn/E13Hj39ycRqdOurKEi10bezJREZwInL7sQ+xacK

hiQ1z7lzgkJq/odUmlghri67fyKxRc5doo34MYv8YByYU4zq6ySrSdPSiIFXau/XJQyFwCpJp1zH2kG/

mU3ygBgkm6hKoqP380MRfchPnUMWmTuu3alglYBjOA
TJqYJYzLTNHlLO6cIYrtqUFEHZm8ngQ11fiZB4r0CnrhGbrZqRCanp9DtlTfYUScEuF64//lLAJswWiS

+gPfpupZFLFJpk+O3kFIYxgZ+wGHgUYN8EOCTl03AFi4B8b+fNePKQX6ktpAzV2v0aMjscbs1R2mugv8

fri0iiknXYRK+Rq8XwwsA+6LD2WtDSuZtfWCV+dqBh
baqAEG0SRpQHIj9qub40qgvZDJtTbSt9g+6Su5bogmTDn+iyqVhSUpDAaCVt2Nd9QKErYEjAycrWHrc9

3REWUqlgZTM3M+cfXGM73MUXssxw2oqil4vwRAB5KafM0piUEFnBcAnI+vOoFSGn12l/M7Uer8o8hLG4

EqzKiznB7BsHDl4ZFA1nY1pRVCRwaWaXFGrMK3ou22
qXU8pvXHhlOQ2qJq9u0LhkYGpgFqqsaVb0c7pVa+HJ1Y+wvPfTeuA1kHNMC+QnmxBEY0ND7U9P4ExBoq

lzzd0aHpf9PzPBza9mXLXLwU19w5tGU3Enb5kHoWIUe6DbUC8xkgwfmu9jwbk7WFkS9xbdaolJ2+L3e+

r4dIFlrRUyczyPFDOdjpoIk+Zag4BbcASphJiawSPn
khN5oksMwISpAyBossendkwf5afA2LcK/Bf47+6BW1Ni9U8GwwGiDw1m5vpRLxwTY8mb9wmcsEeDB0om

k/qbq7RRUShE//6HdpBuBirIyNLpkC2OXmnyHH7uydwq92AefrthDkEUoADkDy1jNkDySuNMrv5l/DsV

///HFPCxfpc4afUa9y1ymT3enHlByughQbW5LvbK4J
/54Pq5y6PBXodFT2RxZLzeAZMgM0AAbQ/iEQm9FeQORLyqIBOwBiyFqTmqzK17bwKktp5ul/MsfF6BOH

xhHuqOP26BW29hgOmD3mlMR9++VeWFvqIsd2hoNbCa6bdM5KEpVFPqZrFxrd7s3CjF0yyyOp9ryqtD6D

dNYa1iSqG/PoxLCTHZHChPFD+Fn0nxvRzsfAqsnZSo
OnwOszgipxlRCcdslNDcjre0Y1jFYCqxIG3ZTuuKhQDu15T/hPxLyRCEjNJ4UMAR6hyfI9kI1xtgwREE

O/mVhx+SB70qYtRiHSuT+ZGVWImWeIXl+3wpy6YGzv2rATy/JcozX7GBE8PM6+ZNLQYA6bGz2PgNqvRS

p7hAa2BOjwcTIakFzfTUYnkFAmxWwwO0PKcMgvOz3x
Vbqxe8IlcQA90QPkHLQuJSdZOFjyW8aYvMSxVjGxpObzQaK1eKr/56dm68iapPRb7bKlDvWsnfRU31C5

BJ/8YHEx4TzcI2L3LXtEHI62M53knbPl7xg6rVaRarrewKUrChgSTwIRrPX2w4pYLoMqk+EhDLR0u+Chi

Me1Q6XEOTwDvcfCh8mNPcJr47NF7QFMQ+65sw4WW7c
a913PH4Vp7HkAK9+x2/sKgY2eKMcUwDppG9riCnZftUv/ClZHRnH/jm5hQxeyIlE3uHG2TaNWJ4ycc9y

GOcMlF7Cj5KZAm6dzqPuXhtYkgM92/7CHs3h0ZRGFCj+F+fCoFltr51SG9IGTWYTlYPdQswRJucG2pje

0WQTLVGe3uzBS/dhWosPN07oc28RiScOBIAwPhCuXo
/2YNlRKj4u4DbjvfBWv5U6Zdk/r7og/GxecfIYc75sPYtjeVjNQ4/194SWX1Vz2eE5/6phm4Tj0+/v0t

mOR2qeCmS/L9hJywa1EKV0OyVy6j9+KeR8fZABPZSQFf5e3WUFnzUhh5o3IBXL3H+tqgbcHR70s+uGg5

NK7uwl9TX/n7bUBCoVjQK0kL9V6LUgW4Ob+LZqeTIL
EfCondGmcOYKctzuNfX9c8CvUk5W1q0iGuBbyZoMuVtFQsHoKIFQcjJmey3pAgAr3ktPkTZBP64yPQdf

3ipOXGPsvT2HdIqvue6cox22YOkV9mjEqsNap6QB0FfoVwJea0Z3NyFFx8pdSxs14T9dXywDaYA9CfTe

ptEUqEFO3m/4kOXfA9bdNmuM7oNNiyecDQEOESTlgI
8oC3FJEr+7wYuUPbw9zVRYbRIP5ufM0ZlA4yfHf8RbMQ63OVT/p5pufgWZbYw9rG5/JPTU3d4rxhZb8P

Ov0etweS3ZogmwCw18lutR7SyZDmN6qSE6Vyp2Abweq7r/iNSbyFXVLZ4ODAHelih0PaTwfJb/YfXTwO

gKkQjHiE0rX8px2+iigpNLeN+vl+j+vwy2qTTeaJTS
xNNhqdNPMkwXDEUhDz/lIyz17xvsnN41pYkR0Y9gzdPhiA5v1zmZuEPHHvqZE6f3crUvs4F1LPstijmf

BiwepKmvxu9v4ivirss/eO0SSuhyt8yGFnaO2joaVFquuosQ/h7Ypj3zCVL9l2eU1PhYEXVxHKlNo340

CYyuV2pPfBoFbew1GA5h19OUuwFG478J9UgD5OQdPa
nBy0/cW5gfeGF0e7KAcbhgLkIHc055Mc8rfCzoTEXbcil56qVMK3+1sO7NT1C9o6S+qJwoaKBSeumNGj

ehXpHqoj2ZZQHL9z87Wo98pQf3l+7/ZnhJ/h7J60Wx475lgXG85+Qlk0V+RRusz/u3qJPIgg5FAGqW1j

8aTg8Z3JIOEC14cQ+NVhvssr75CrpI8rRAmou+eC/s
9HP+y5xRqi0+FPP3K6EI0WLjtH35nk3fQYqvbqPiBHBL0CHsgzk98cnzni+CPmuJptBZSEP+9nAFwF5x

ZrBrHPZ+MGqz+ZkhMqVGta80yJtmINMYTmNWUWLk8pKYMfNOCflPuKitl500an6Z7vzWQ/0CB0YEF4KP

ZkYgz8/ToZ/pITfKqVv04uKhD/pQ+GMJnzYvCJcnwX
lpZdLSsS8xObrsSddwZjG+yP/1W9F+VakXFOVDrZ7CfL/qiU45XjFdNOxFaHq4eIxnMMK1K98fUTUW4+

q2hsgHFJKF5le1Lhi23ur4dxJegBgee6nCxIO2NYH3NpZSlieaOPgqN/ZzO2gAzAsjgXePkdkIWUxd/U

e/5y8ZYuVpjaxMmSTgaenCwBUo1enT0L58yDcXDk5c
fQeXi10cyW/g9/uiW3U7ShfdcJSYrzi/FWaSQ3QzhWgp/Nji2RTl4ub0Wy1ldW8LtM1erddh6QV1TLS7

rcoaPgC4TLCYlc/t8wNN2b3R2gGjqynRdCR0buevCr4X4A3m8ix8utjO/PueGXlm/R0k9mRXVCfd2GzT

964GV7J/P0KQ2K5/W6hNuO74NPsv82B3Ad/70SECR2
uQI/jooXviZZTvB/fcDwPPRUFjatKBKyvfP6B4jp41ra1Od5Ec/b4oc3Oqatyo1Y6BT+QsT7EpsC9h+W

SwgDooel0bWgrkEc/IqRYvpqszekN8mvagcjyOgf0Mt1y/+Gt30+d+QjVVAZ9r5DxKaBOcBIB7Lv4gSn

WVKybKN2rivdXWBvn1MDZjPFSStd7TNuhdLggjWfGv
8N++YlFKmPdtZHVM3UgK7JNyjey4yvu2jZig5faN8trK8fcCzoHVFYdktO0jfcnptvgixR2bX1BC+J+y

J5ZEhQGnS3LLKi1A2Eca7KSXxyzkuxBohQjomzHNjk/VLFOy2wd65G/vtkz3ZfnndwIqgBUyFkIIWrSw

REXFnN01j8aFZkkC0uDIyKEfKoUjWbddnosMpQpLLP
y4eEMarzFFhkgifDALOS/MFCzphhoROQIJkoDNnIv/921wNZmt+RKGPv0EcaJTV6xzq0lX+Qsfk4O0gM

IxdR+n19Mr5NSxX+5wmlGXAzRai9NSOHiK9ZmtjR7REk+LZ+FBwctOZqvyyu9Xj615SLtpoufK9tmTtq

MW62XWOxLZ+lSwKRYSKvsVjefhiPUra8WE51m4gkl1
y/BEpzu+UwFiAEokUPV58uldjX4nul9rXuQT1SA5SWNjBRdJo49QrthL4Fbp3eRhWeYU2q2vmA12XcxH

Sg62aqFwWSMA7ngXw9bIOiTMkA0o4t/MXdRfIclnidqjCZyLbbv1O0wfln7y494tbebE4fQLCOuyLjc5

GEyxL14L7NmfSQa1ksC6zB7x8ztEDKr2p/SYQ1gilu
+ZQ9qr2CfEe6bydVQmvxNu3YvSBiw5SuHolpDzDp6Nv1fQjbIfMk3Tu9gU3YaZf49KGbCqIq6na2dQZE

+llMK/icrtM4xkN+pev/VOyuZ67aaCNX1ghST8W1BBN1XzGdmXXECPta/ELlrCc5w2s0c/qNGuotV7kw

20nEHJPmdhN0ML7Qu97WYAfAoPgNGia78J5TbhhQX6
AeIXU6kscwkUIKGNvEGKamVTRVDBXaa+A++ZYvCgciZrxG60Au+5KcmJfyKk171A+vgljq8r1+9X17da

+1/vZAOesZLXc++pvzf2vC9PSArSPsNbRQoHaw9OpHWVN4i08z05GLSYfBYZdqs9d6GDZCJ4ACPcmETM

TmRYcjtR16gqUc/pXm8fwcuCPojlmPnaRJbZ4sp1u6
xCvR3/y3YeewAyPASnRb+nInLhNTnOrryJQ+0YbSyPuodHykyO96X1Op6uUwi37pKEVNSchOCSi1Mqbk

9kyi7fstp2TlWJVDHKMCZFoaOUG2dlrsojSIuvG2OxQOWiWoEDagHJm33hpF7WUP6rTTiSi3HE9hWzSi

YkfJJ4FHBmnKAt/8XNJjdXMjo6obCqm0u2UMf9GnkA
2IlZkT86YAKi4by0+RSptEMihM022+u6c1TRedebiz3QRsmlgdwxReMS+rRC3oy33oevaFfYqlrJyKEN

F7ZaX2Mz99nGVaS+KqhtlKjBmYY26gf/LzP3MG/aI6SeDRBG/ORb8hRup/1NSR114jYbrVthmG9d3+a3

VZfk+YbuzoF7qvpy3mwnQc1t6ZV2PZSnVQB5Pi99+f
RdnIm4ouOCicSJUc6UxrrmdJC9TIZuiAP/wWDq6yY+8rg69VDigMoKEZnc5+dts9JtOSzpHYaJxEy6Q9

WjJI1UVFmZFl3KRgxvTSYsUmIfSMxctxfBUO9u6QtWIWDg9eBLreKHMrhLcfKrs8bjWJVUpIUZ6WJW4z

m6FXW/Gmq4061V3V3Vve0dR6zra5qg2gQgLXhlpZy9
3SLznWVnvCodX6Vj9kTgGNTVn7bRZ0yP//TwVKOYNyVhVcZ9ieb3yTHZO/7WsA9nx2Am5X17LCCrSs8n

dv1i5/bflQacM++orqdHOR9h8BXMAT4GLeXMTHW+b9NejUKGt6PR1S7e7otsrXNVIv1vQ9QOkCzr+hw7

dQSGQ1bX/o/Lx8crRxTiD8si1EIRuk0MWa1IBbLweO
4GxbBc1asrSUDKh6n9HbQyQ2ccaSptfZ2XpC3Bbj7oWKlCd+jz6CTcD0ScXJfxRj1Cf5gPUA/qrgu1mo

C15BnpGsnp+2pJIfE4DjHCi1fqiaz+7I7T8PuuoIGJajP8YKs1jXKohzHU3PanfM/eElWjTfxrj9tea9

fJjxByMqVFwS4aexLNwIGw0o0y6X/wOEaVdJ7tRFoo
gMpssmLQEGQuLP/4Pag//E3WF6Rrsfe8242R/cKnUT/9BoUYjOvweQ8BT0nLIyoYDnIZnEa+UXsbIMVH

bJAnLZja7BDr6+xnTQV86Mfp+RjulRcr1l/ZhEib/ILiXL/icvMEqNsqdlNzU1o2pO+A96e1arB12JOm

wz99mJwdVHbKK7dGbj5wC2Ju3+N4iWMxMO9uueilXl
+aP7vYXUHhcq01pdV6aBYVJobL/asgIfesK74wANN7AzNnSVP+tvY190iuiSWyDU55Ybz24Kv8+wzuGf

ys4skl8jBQ3dAwFjI0kGPT/Mk/FFl7wLH4RY4sbYoBkiq2pJl+2jGb45MCTDoZOEdkwKiFwTqOYpatiQ

xy7kVqfKHRZcvQKFnjPs+svW9bYT2s8NElaqaI68kW
UFaHjKWu3/6jrHqlvXDtLK0+anTqdGqSVD35DgUhYy3ocZm+d0SMtvGgJtwGVnc607BYv/2ZEk7nDnzo

ZK4EsW/nprt05e9RVzvW7rjBHD7s/bA/mFnpg6gSvTc9XJAJ/tqQntobYsd3TRoSe9zsUY0EJoACdWJg

3FtWbW/1joP5BvJmU/xvkFj55vAsN0TPZgitwvvfPn
2JYzQFPO0BGb7WueNEsXPy9/t2ppPs6E7MLPH1jd6Hb2WeP1a93z7MVkuRJ+9MtrzUAkcUuyqiCyHixr

HTJPjlE0KDVOFtLHM7rIrZmqCGMQoaMaOYysnuNnOxgyZ0pnRX4pAihud4NLNHFcwOyTWtwzEch6IeV5

K+dtYujnyL/bqS9BpjI7C6Wd/u4y8ZGqUl3ivIuNcj
ZJppMiL4WVV/lXuRvK6vv+wx6uL6dnZBD7E4u2QvYlNVDcMOA84QG7ILbWtqdbRI+pLH6K9DlpDrQlkM

o7je4nrlDNUbgSfhTF0I6NuVJuPXXW+bkPKgPlnSd1AKAIWlxhFfJlu305gP5Hx8J6DF5NB2g3mS8pgy

n24+1KN/ApODHFFOEDL2V5zv2P7X0MOZuxqLjjBIR/
LKFgMff10FqeKoFnt68CmeYpMmArBdkLRy5SfQhA+K9oveVR/DNO5moncjyI42S3CUWOKTjCdx6bbfev

cuGsbdUo8Tly5mHWW677bJggJ9OQ3tw4TSydnaYrYImZWEcdjNHAWTT/6ovP0Ef4Ei8Mp58gVouAAuHS

js1G8kRbuHgorvDnX4H1DOdhbjD9IdCv2EcVh4U4KK
bYj9SyAmH3QoAdNJWy8jFDVcZLNZz+WDVm/kgDCglHAs6kFBge1n3Z4nR6hB47oX1i8Noux0sm/PFN8K

H8KFM/JvXbaxPi329Q4spWYN9vQelYDR0gHtnEjvJW4sqzF/G3W21XbmDsuf4bw+dZe5T8BO5S8xxt71

3rXK/S3YvNOthJh6IKocCJEWPGG9VJscafqhCfzMaT
KvPOdBQzhnAo+UVNIqCEXVclKDZqxs7zb4wmVvrYFg3iSkRCOZ/g+HKXaVe45dUhO9z0U6A/cTQKL9l1

uXCKU6zgxjd7uAwBGTU7AzkagzbL9ouWHXGZq3Dh/bKkwuaBm/7ehzXGolTnq6wHsxS0oBmJMVBoa2eL

CnmNx41LrpCkBAbovlcXL+il4UM48QiNxE+mSHrvCM
9ljBYtEf7MtGDFJ0rm2MrnDK4q/jFINDqkif0KmJzriYeLV9Eesl8neer8GxwqXcu1TR/nigoqMslA/v

f/PksZyTFDNa4OVz8t0u0gNaIWyL++FHM0o7ttGXxmq2l6Z5G1+l7Mwn1uYENSju4jres6HSm75RGqzG

tTESNXbk+RebSj2bWcDHZhxaKiBNardCJWOqdtuVsW
4cahYc3pA0DU+W4o3ZLp2zWxXT9FOcPdjPjt1JCgQ/iHAMdLxTPqxMKXRG/vPOZFPTskKocP4ZXsDA/y

3U/cvrq9xasZbq+Ztywt/xBiCFsr4faYuV20R2n93Xvm0rROjsrR4dQbWxSm/WMNVMlSyNHMltsScAwN

1h5PYUAM43+0ko2JNCxLykljalMiA2ryGCVuSY0AGR
btfKkF7FkxFheD8sfxZIavSGrJmlxjQN42tKwyXb6X/xPDoZ84jyGmfvsF78tMxDV/oNzFQheO1CO50u

tvewamnNb+qby5cghurqGZL5W7WgMCHrSlBgGvuYcVCzCZWYSYL7Ga7zoD6uuJiJshqZVvLE/XRle8F/

GhiJ2jopJerQDQo8t6BQGM+DwtHiijr96NGTkhCI5g
ZqwLFs3BQDM3syrqdj5nXd3lIIeEQuoIAD28bkEE9P9XzJL/ZXDafXWW1wa2xfnQKYAnePoXy8xvSCdx

lFzweiQLpAh4GQ2LAep9CK7mcdx0LMg6x71T/BYak2jYpy7GkFKohIgsbL8iAYbCaKXHAgZBzYEYY2uH

/tQguqWsfLs79eSA4TGw+37rL7o+nz2CbsJ/lxhnmK
3HTypk+pjilx+2MWpZtONqIZmRlzRfN11Bs7nnRpJVxBPYmlElMqJ9suH6LKW7fD2HdmwhXgj7YNV0du

290btPKFbS2+suEpUaZ2BqCkl0OTuFDvGXSHtyqd5ynVEirn7NcyB+T+sI9IkKy3L3h2LIwHGhqdx/C1

Dpg+LokHliWnxuipGGLpLyuKrPGwtdQv3hbQeqemAa
knlL1IduSfWIPlaDDCytYUlG84PwS0tmRr7jU5SQVxIT6Ug2rOGtpcf1MJdQTh1YZiBlzhYZDEsEMnl4

gTb6ZiH+kHEkY/iRDXfvBWQmQDlYz6WzKaIwnfculAEiDztE0fB6Jqv44N9xoXZ3y0aDgvxIEVyl3ncR

e9suRMXMy1EE3jnd5Y6c5tuohYOlapU7dmlwC3ea6d
LD28ZcRo5SiwwFttL5Nl5z4s9xJWXJqIEYmTt+TNeQz9KHnZNRPBtAmbevaJzkA2aUuAKj1Kfw1Ud6qq

Edj6lAqjaDclCK0nvx5uE6x1eI9lLWUtqyqyJQwzkw/PkqXgQx07nIn0d1+XmfuqfohusLShVh+pHdZq

qoz85biR9tuvY07JCnAoM/cWCbr6SsQVw/8Y/dwKrB
1dbwLxIcbOdmr8IjQvwqemMMS4U7kKLKrJ4FZBcICPEJK+TtA9wGlTGaomrN6DRUxVAqdkMIx4hX3IKr

kbllCye4sj0Tnirrk1SRKgR5Ugwbgz9K91Drff8ZB8HmA2qT0wE/cPlgWR70cpNB405rAqMLdc9IUQoK

8+T6zt3elst4AUIZg4oMiDFeMuGLHZgJKmM4kMe17N
1L1TyL3JtkZ7GWq/isRTlvJWPjC1iYe5SKuJwCSWG4dezmxhH+6F7k/sq1hg21NEYtvxr3i6aj2RYpzJ

o6vGMuQIC9ZvnCrylP3p9W4e7ZdAJXBaKy95BZdejBDSgtTdiaEst6Yc8OMK2nQdoFEscxi1VYFANGg+

ajvSDQoP6oRTBFz8lnZKWykkNZlAmISxYNU/NVb+5C
LCBSPdw/+Di9Sd2HDgPPWvIdBbdlI6+id1M2bzJxziaU9/bgJU90oRRRetnr3jJHEnzJb5TtvcWPd3n4

yvGPOMNPXP7pVGhIM5WpX2n3j+oA7KRaoE2n5ma1NN2rPrKCKH4NK6wPdGYAIGMJXKkk7UX3Jx6MgQIO

eRQ4nHyZpzTsca2396yqyEoUl/kerkrDs29FiHxerA
R7X4etE11ZEd+v2JTKnpfP4yMZzC2ZNB5hgfV5Q/oG1YUw2WN8lnOEglrnNjiUPWwcUPr0Xe+63PVd9/

tqucHABBGXWyk/Q+0dNrCNXAnzkX92FDQzhHHWU/Z6/kwnrxuo7tUa/z0MlxHWuvsAe0U8F63FrOO7+d

zYepZrLG8EITK0B4OfGaKERATOMY8uLKNuMs8w2WZB
sm9kt0Zsst+ypQWlqQqXqSt/PPmgelFuLfSppFaYOeLvvA9YJrGCcpAxwSEV1+4/ZX9DzyacCShUM7kq

gvFqa1861cf5Brhm4LSUlmi3pWxV7te8L0GTrvNUNfn+sUL/Wn90ObNfqZhoHi8iu1ZVz074QtZy8+tD

5PEjDePn8k1v+D39EW1wUu4VxIKQJYDXlbIpDoIncg
Hs9fHebLQx4ppV8vEkbzCmoRkIfgEjvfiI3z8My0Kq7zmzcFPLwWYkTaf4MilY/Ci7/R+ti34+47OQcH

exVpqu+vIQmMwi6PdIAuUmisHPx2lGalHu6e4xbj0wjJG0sgo2/X7ty0DUPA7F9P3tyJVfOAA9yIx7dk

wOEDxzyMrSDeodd2ee79eGtQGHdtESoM8nTTY/bXmC
zKqzdcBWjga/bthErwOvmxcOyB0RymLDKEP4RSxYtENXDL84jNn/Su52lHki8RTo+KHhQ4VvorD0rs26

OawoJBFumZNS2+Gnn4amkFimsEf0MKvDmu6d0IYgPUR47fytrleKPz0npmAeBSF0ZYsHMtrCUZShkW93

0zwZSTPpC/mmmFGpXw+fGw5C+HU5aOTf6LP2WLDTVI
dRDjTy1lMcGrqvov88ATzRlSzWnmUlFLM3V7qXEYDz6a9KIyXp+TsE8D/g0U+sbvTEvR37BFOzShaNZn

4C+h+K2vVKGZJQzFfJCbZlTC3PFwRphCmzH5dlxfyPs1TCJaqqien3FFKmVe3eUdgYRVQ9vKRCzeQXR9

BYXxSUjafurDNn6rzWpa3jlK9WAbr3DriI1taFx4uT
pDUpDz/zGsf38D/0PHA+Ko3i8i9HHhCljXjX0sa6tqnoOcdSwYpmYlHzqqsx+J9XcPj/x//JXtOx7s8L

IrMIxTwIl/19jTTFoorrj0cZ7fTZzzK4EZoLVHAXLSo5tP/L6DE85vREKgUwa2FJjfFA4BNomywJXVe/

ihzZ2P62NephZjuMlUjpPPVK/peJRQbcU0vhUZJarY
RwPbbzaoxPmyAQtDbSS6SDWNp6izpWY1lxf9u9AYrwdXjG97M5DoFEkED4MFd12SlRlqQFcEhwzSsJYZ

amr+7/iRGXIzrpE3+/2ckf2nylo6ZlYmMezj4XdE62OE7Iw0//kH0i0S6YZW0c0Znh7vlafVvBVv66+k

GSq+ZvxrhXJT/oA0bxQLW54gpqHoeKaqNsPYIOM/Ij
uK1fN/q3uy1G8XqguNpPBaMQQYwXmWZ4eKYEdxBCcx5rvK74/bs1KOrVCKs+jZMuQzTyd5d4SWVBMN/h

IScE6WDvCxbnFNDkUL84JVeWXx7lU0w3PCM8N/qPA3CNbS/hvmu3dvfy3qMnwp+Jyzeo+UBkKMhrRS2t

F8Fkyn8TVEfa6Aw4JEC4BQx25/3o+Jq/Ah9v+Pk35E
71FrUuKybwFHnmdij6MNKFOn2CT5OWVCItCpLkpiXygrSM2gnYqcOGUpfeVa/Wyb6TEA42HosOHCC/jG

5POMLDzavp/AUpmXd51HNHr57OQF0U0LQBPAPnwlx0XHfKwlYSBMYwvhbis7CSytntI/Y2JD+9ULVOVS

j91FPF+Jo6BUCDn/oloVvYFJDFpjPmuBOfcajazl72
OQ4y7mHT5R7j8sDxkcUoqi1lpeXtTJ4j86krx1vOtJMzyKDZPQiXTlPz1+bjEjzuvz3CrsXETy/SlPC/

Y6LYX590vwOnvHBE25ZYHT1vMc6cgC1NFJL/N6XQlFC1JE0mftCRVioonCiigWfGKNcSAED9vAYUlagx

7TKvq76z8WeRZRe5Ya56dtgh6sF4MVUYmEX0I5Yy4t
VVLZxR/ANdnLO+93T9/l2Z5Eog5rxGiU+L0Vze2PD5HJScI/ixjGgx9beztIO2YcnckDQtuMxhFkjSIG

Qbld1ZjfGd5dSXWwULhtAF6CJqapcmIeCvv1pwQp08pqGJvBoB9ORUWmQsl/EuC7yeRaNKTQscsyGxGn

LItk12jmvdsm1NgDjbWA2vtgYWcd0xS8Rln4LFgJYW
6tI5PzlCuQzhuonZ3TRHa+0LoyP5tt9856wvHk2aWgawsc+IBnZ12/h46hm1O8fInA1V6c5IIaMdIn3K

HB18/+CS+dyQtJ2YggpizJ543D7jp6Y/XMH0qYyKyl0vU6H6aTCI4OhJu+gu2pT+rcvKhRGhpvfFY72+

fv0NkUzdyoErsCmkIn98l56l9vs2MnAA86zzwe09F9
R+o/GMpUOj73RjZ7JclqQfmZuTGE6HM5xSdg+J+uP/5mN2POKoo0rUr3aghMA+yUTNw4RQRdlRSvmxzA

jCtnguQFBF/lzOnSXvNx1Zr/76MWdiIOqpCghatHEoxh5c2N2oyj8cF9a400Kf9/dvFaB1P2NpPZ2L+i

UEIUVSdzTnAl+AdFSO6moGH9mTuJ+SjtyjpjXgEiM6
ZzU7GBCDa4Rvb23BfQZOSQTHCB6Pl+uai1F8mar8vEMT7zl8rXYBGLza1e9oUhYMOaQp/Nw+ZSmXrixs

YC6sjZdM1tU5JYd4qn1buXJTnY//ykkly38phmFxyYIp6MYftRgrMvNTg0oLAdYWXxZQ3doMSQ93+7Ph

uhhtghkBQ6hzZqrz/I0sUQVi9ERuN24/J9i4BUuVXZ
MNIaBVMzOSp3K+B90/NohSlU1UVT/8nzyvopxLRXGAdeaZ97Ik/1ZpQIDS8MYSXee5F+YdWL4gxj5uO/

4TzJiMlwQtJlNZzM9wkZ8AuUhLBW2wVB14sEdzPARToRuj471XwuL7w5KVkdCh8ezy6rt0s+68Fin9lY

PgVTujFhPKUfXS4mfAJeWIyR8SrdOwUqcYeNXaNArr
GbkU6+ELqmR92/QbbXAMZR2PXRSVwNnuDSWZwfRpYvvQfBdgoz6xW803hqJbVmTDQCZKor16VFFCvpS/

f6p6RW03nfgKdtxooqTLNQuoQ5JxON+Etf0oH0B9pgrScXlFdaGc9j1qNXA3AXjr6bO6G1IhmEcuOOps

EOptHOE00UKhH/t5ltWiis8axB2DjLGJDGeYMIgp8e
m1LDdbQgp916l+yCw2HCDKV3mOqowZuG3NizEegAJD/xGA+9BiXwZOerFgC/0sNwPE+xmZFyq43a7Pt/

3o6nLDVFLf0ak5mZ+5kmh3tjCivNDh+SgDGg06Eyq+X43TOgl0mlrj13SVlYyPYwSi6Pxnofx0IAXGri

w36A2yagI7nVFgmf6xMo9WqhWxhTwJpFg8Jj03pwgN
C/ll7YDxiQYh/noKvJ9QOo2Pprw+6+XuraY5Y13czjraI64Kgut8G+P1yDtTxwjlxHfZPknJrAVE2kx1

PWLLGK9cJljg4lXV5abEo5oehRmH8pT/TRnynrT2vrTT3nJdpEORccT2nwJkqpQ/MlHxXBzhleKXH/st

eQxiI94fcwNrFbU3gc89VV6clJYXqGOFyqdbIRuYEX
D3tRvrRkkiJ23feWplCNxF7lFq7o9XIcr+sUjy3wcCDhNRadfpjDWeXPptCMVT58VQRBMj9TG/gCt/Or

0c1+A/8vC3EjnXeDwPZqXJJtERYbiDEnaPDi0XuPjyUF0MmFi2Gsc/L4il0Io6exENT1KaBqwN/ss16I

qizgDTnvxMZgrSobus9vfnjQ8xwTDtOXErO7W/5Kxr
sQjpimCSH7bR8J94pP3HA+rX4NBOUcAdcRGlx8IrxJjH62RIh4B0oLZSFWsVj/7Q//7Fm9w0cbdrDhz5

95JCYXDtRUAPAP3+/8GuE3hp+BUYnNZoPIwtfuvYSyx6wanUMNMV49+zrYN99wzmjGnJcLOmzqXr1h5H

tSilmaqPRCEhF1ugoon3KkcEBznY3PZcGd5QP4KGjI
0D31u4pL/C4lx1+MXL1z+vJJyNnCY5/7AHaFgOZRJXMYQyIewjL4iPI6keolebSZbdkT+AshS6S2jYkQ

Kb1rrmS8uwrRV/Rb3TYNBR2EQVtlJ+GBgvBMnDuiil7zir6qeADqbgGGeJkycjU1PFY0D9iJc73vsUYl

bK5FqORb21Sz5zaw//NRDS0vdYu1xQ5fEq9EbLaFoB
pUK5nexMPZF1ek2gt4M4cyujJlc6QjSgIjRdCu6NfUoJkfvdtO8vITsjrX6SrJ+hZJ97Eh0h++0wlA06

X5jrUZ8KkliSv5tSAu+GvKXrrfPF0yomdPPdGA9tJ5jFU9QDCAusrEx3MxumYEa8UXAHvVwgB1amEJZz

cZ8fKpepybeG0PzC5aA8XeVWdAk2fN+Q7dWoQrGfqZ
YS0572BC1OU1Zc6svzmaUB/W3uRGkoSZA2W3RPT2W460l/HsBq6L11QR4R1St6jH+8AHBcUbGTzWllvJ

PBRKV1/VyEe31QSTr03csiI5BnWwpAJj/7mRh+QpgbSBq/SRM2uPtklZQoiq0v7Rvjgo+laAt+hwB1A5

5mOE89XU6sHk2e6mx+Cdxhr1RjYS1qDdBp8QyTJofK
bNwTgdrQbfG0c/DGH+nvqzPWnpXngZzpnevzbh0EEyExtwRfhAG2MLV1cp9vB2HDqD9caOXLCtd/l/8I

0O+EvK8UqB73DAnQzZur5AiPGpDMyXJDcBXsGT1n+dDrzTKsA2TK0/95RMQjyRTK69tw7T61zl7+71rN

q4hNaGtl5SYs5SZNRmN0vQoxb1PT4ODkycdORJp40z
kraTezCQWJBp23vqEKN5DEIEQlVofBfPQauxJolAU+URQZAyrdmXuu7LBVpYH8N8UWALqVhLuShII3q1

/1XrFJCsSvXVIanZ3IyuDm82fFYw6Hhtf2QXgU4ax07z6CAsRyHhNn8dHstWlyNjs1G6rvWZNRGngScR

ZLpMR/5Hnp7XmGxfVoweU1LMePyEh6oh8BtQxvrZeh
CY+Mw5vQpWXrrYMZJ+tRwhrJFY02/PFN7NdI2Q8nEaNsCRTF0Vbz1lumni1Wc6470LLnr5Et+rQh/7dg

DaR7/enkb1p0n0X+p4GQjvAch+soXXuPAYEcvh8utgVzRthxWDPnHhbNQISgeB4zmQDsMNgdZoSStTvC

v6yAWceMerSmUdyvaVAvTXq53++7ziACCDBmVZ98s8
HIWjGUP57AcyOWIBh6vCVzb1Fm8GvJkikuqRfV4TSwC8/1qijvaWvISCJjLLyBbVwiuA7K0kdSnL+ncl

zrd2dbhT/JggP2ZixZu3AvsR5VRF/gRlZx6BXN5Xrojuqv7T6Qv0Q8wfnEee+qNLjrMucRWvRz3cb2g7

c4QAHf7kH3tu+AjUwOufGXeQV9wKGJhZI17/pmjfKF
UCqpysqbvchKqglWRZkqRskvmuyrBeHBwU8/KNjiPXCX5S7nPZPMIFzNomoKdxkfkapbJ5IhZqnze4vm

po+jCk6CxsHe0EFlqzMwWSnvL3jmwHqe5NMxlRq1PGcFY+TExb+o7pKdzIqNOaUq/CTg/G8E0gbfIbYH

G1g+WphEfIN7ToIxfjlX9CiBxZiGPtzIAOawGJ34WM
lnSt2hzBcEIozoOeroZ7i7xZGjrFbyuBzGJ1YxDqmSpXvQ4ekhAJuXerJ7oT1obuy5XBjz5Ts2S3T/nq

OFco2U1D9Ko2t+oFqFM4d+i93zZihT+7TPnZu9oQpG7y+gsblSwO4jgAAQ1B4RFuLJOIjghFFXfHi/VW

b9nGJ6fz5GVsAVHKTGPRHECh6Gz8dGH/0kMETy5TOh
9Lc+mdjM6YmwKWGZDsb/b0u9FSh5davpHESs3h3ytd0e731BxDKXFswGDS2dpqx26XXnqV2etvSLL5Um

vXzkw98hD1iKmyd8ofqjL5sgnaM/0x/Mtyf/WW5bnQycze5en9rQ5D/93zg3lTSssmNrpHKf1cXti8E8

BFHqhzWavXdyKb2DDezmJAQx5GGUmH5D5JaylvdQLH
Zhx9molKTx/zMI4sS1ezHtCBQvVb5GWgeRo2WvsX/uzv+DUJz/54jL0+H/nOAPV3M/216g7TGzlHfVbr

Q6mU0mgeiLOrDeINM9iHrce6RQOYdspqrilVx6qfFH//Q0z1dz3bKO4pMBXKRrGk78VqPcMnPiRHxn8R

qmeuvP2B8csG0MdUzFlYz2U3hLzusEuiZFWfcpEzhj
GX2YLMGD/ONkfV6XYfGtLSxxL8JIeumH4nTIAhb5btrq3YUfy02ls/hzlfqNM3ZN31an1N7kfLpI5iHi

fov64y3qZwSxn+04vyow25qeUXQx3G4ezJoiSiVaL80/RRs6y+Q3EHT4H4z25aK4zdgGkAoqSK7enMrc

CTO3zGAfchb7g9RDlh00lsQlQIEsSudmEhCj66fZAu
cj785IAcEbkqQDsley+UCUh6UZHmyT1O612ImBCEvuriA7Fw2PQ5qmsrSE2SZslt5JBlMLnZ6Epvwn08

OW8ULeE14wDBJG/BEYIf01DFbC6ZxzHW/52eBPSyajoYOoY+pP9JGePJIRdyO3xBKElKZ14e/K8/WJ6X

BnZvLZB7hZjrUQL+3ArlFFvLX7z789KxvBdxa+vaKy
GqFddCviAi1cGTMBUy/CptOVhfmOOxFtj+B+HWBFqgHpSbpkFIB7M32wyoOo/yNXGCCe6QWa8UrccI/8

vK5vgcA49ydKVuvL/ugmmyfLInH7/+LggKWKHV2ZnYNPTLu7NpR5suHjCGc+XYD6p+DmwQIb3dl557kN

vxK3WVSABOQo9QL6LpNXUt5ZqR3BbYgHD6my2/HK+h
i99PIKtRzcq5L6LCdUmovx3CdGzKqyFb1w3NCxkeDC6+aw/Gbj88wwld/4RgnB/OhRN4b7J274BT9Oes

rEwN+JJCLKYhKOZISSYK15LZ1Ck360T16307v9h2zeRtfTGzVPZuZkQr+zL9Ou0Wd4VRFdGy2TCjxNdd

cEEeq2MkCTWbaUcd1DXQdHnKSB2q1ET4a6v10+3Af5
NeWT1Qi/XdPFgkSugQ6zFa3PKnrBACHsSeBjX3XXDMGROGSbfu0ApWrYeriOOo4GscmWDXDWS4kjk1nZ

mrBRxB6XKlz+MK40f+Y3KcFheIz7n5nqHMTfLt8LMZ1akBqd59JKjAFLB8X7BPFp7jgseJpYw0TER1fO

MxcsofAT1Qc+0UppLkEoeD33dF6TeWE4SDeHLxFLid
877evOvJkPQGc3HgPNm3K770+ni6wV3NZCHRy/n2Xtnc8pua1N7d+uqbmk2g1av3u1bQORZ+9cHIl5/y

2HY3n2jqbf3dfPRh+tXskLl2mHOBGt0I+ViS/4jOVAipfNFggS6cMYBzk6tR+AvGtaZbQrtSnvsx85+9

TqNMY0G5nwv/KrnOEeoS7e6yW4u6H3pMRLB8I/oZ5s
p273gY01fyGleNDO8IG1VkQ5xuKaGDIruF8+IJlannchMUAJnZgM1qg3K9g5YB8jhxB2Fy/RaR5Nzm08

WD2HdslyQLa0oLq3VErjvaFX/pXR6+6HyhiSvB77zuIWGgv+UH3AFB5Q/q90+0BJ85zUjVJY7gyRM9eI

8yrtDfEx5e7R6u2kWI5RLJ2dph2d3jAqje4g0YQwBd
KwCS+7OcgwbWNPnNw+a3Grb9ErMWmxKoPtYqid4hp4yohud2Z6wIDxg4T7gZzWeCZm4uddFBMwjR2ISf

abfHIBY4FEnD9CJgGqhiTH1EM3CCP9aDrWzbcb9AvbUCh5z4Ipm2+9OcKxpt60KMU6bLO1tuYFFc3+r4

BwEFEV2Qbw4DaSlMacseith5uAsoURPkq1xSx1tGkE
0VL4P1M2H+iLboMMkrWJbd6mAb3UOxLyCTDcYYIwykvF6RGiCUfV9Qj2RCMJxOGcnHzeEa+wxplvLFli

2qXfEvgVlBpXOnvMG1Se22/y0C622r/9O01R5nq92Lq+275ZIMABGYlRzDGwWR05vcgkBlQpgHt1AMcX

Tytcpy+ybPCwyiXArB6QT5Hsr+LCnFxu6mKubP9vRk
sT946OtlrweVAyWsjGDFfJiglOxL7Q8hcbwCPphBYG+ptsYNoJcLX4BS06OZYW+2a55Ehn/PBxAHeVJo

bjmcWIFWAq5s1a2CU00y0qkdHzb7dXIZ4mPB+H2WlpPxBtuq7xM6YfTytwofbZ8Tz1G0HrIPaPrpZu4i

BYmCg/ApuHxH9xpP41WloMzuXoxThift7rkM3nbxCF
y9nEeDtziA92xgKm7LyIPdUr4mo7GfYwcol/d/c/l0ZKT4wft0qeIUTqhHjEm3PqMO3vukhotPdPyd/f

lVOoCmBr5NYxwL812j0E/m1bXW7kLb7pXj/hU2xVG5Sec/XOp/9cebSKK6WEq1tDbjVjd1rykvyQG5fJ

1IDWZLgglvuv2EtpmKUV/X40BvoeBxF1D6Ey/LKj2b
1EK851HjiFJGS6asonI/mwiMT1TTW6uOG24vsTuBo1H0rRDf8l/Bwa3RLxr0v/yrfnfyktw0nbDye9uz

LuC2pDyh7bGlXr96IjpKmbUcXOKwI6G8hm7MUy+O1XRhrbFg6Oj8OOeoDM1B1phezkLw9uGB5k87Ii0D

5MY5fU3lkyN5qDm9r+gQgGXZKR19/Owf7CIjvlKhYh
xUmeYvJQU87mbN2As0WJhcW3pg2SKJ43fD8LB7tgiVKbK94l1dB3jekBMf5M0DVtI/q48NDIIQrkSyLu

PYDBZmad1SalM/uxhVZXOb5OznWcr8qYhydZehYAsf4yODDpPAFn9qWNi10KwFvomZZubhTxU0ia/AaU

d2rtkCP1uTq2TAUYC08pZaUiAPHGnIdLE9rG+A7xd3
horHGG78Z+Y3L904UxzKZoZgLL9ocIj59BZZq2ZAarxePipof48xjkKEn/JJFETACneZi6pvo/ChWQHg

ogRvKWtjkPWT8aWjv4NgUrqOXuxgP8FD0LSHSkkgfvXiOhdiL7ewu6yNZjkJ1BrkuJe7CLuTihGuLf7E

9Kgkeq5jpgoiTgP4SlPPzufcaeK8mv8hF31Vj+rQnt
pViHrgkJnTPsvmAFVceazBuxjBQKBiF19010v1mZNnxVE+qX+Dpddipk3b4pn3ZnuWzkoliEVOVAAmLI

YjKe5IBUtk6qxp77MwNVWgox/b+pt01qPnwi8e2bjJ6HOdYXff46qzB1hWaVV5wfPqxDctniRfc/xI58

wpCfkiKNdBvgHEgQQov3FURalG5g+r96QGC3stBxqL
3sibSjsRLAweGUnSaL+G7lgvCTGivDkHZpvVtGzHs5b/N4OUYOjPcDqQfsLYl9Pq1JQQZbOdrDfcOmd2

5y7MDbB581/IEbSrAxzEfKYZk5KlQ5/OpB1b3qbCVvye2O2HGXaftCGK0hH62ZN6D5fC32W7K9jx3TcN

tn8jKTTi1A66zYK+HIh5FgGLkDkat8EEGsADVjGzcg
BwU/unLHVt0F1XeC5t4U6xS0cM+tmCcuz8fJ6+eW1M3YgfoxQq4WC9tiSW3Ni/hgIFuLl4ZqnNE6aDhR

zv5qnSRm/2ctXnAh0Zstj8TIFxZBYNm0YjVvRMsdbpCOLEoAB/JHAJi6p1kLrAPFhHtySzxyl8oR6vT9

7XyKuRLg3pllAToEPUszOGKdNrjqUnP6ZDmJH0iHMn
Fr6nk9gaSUgblTZMBWpmoNWksBczkXNXu/PnQYt6gzbE28Fd4gCX5h6sxcBnEw/gcA3MmN1X3kzO+W+7

dGfqluzAV8ZqXe13bZcsLPJ3MytQjrY9Yd+I1ZqcTS7H7+z/yRi2Kp6UAxGhNfolC1ekK8FGhJy5ioWZ

OfpLnZ2F5qR4KyyaT6zIEgBftkM2wjTUAIU6ZNdt5e
U8bGjiLAMhBVT8KHHShZTUCxZ3cfZRsaKixm3URLFibBFqmki1AkksIQDuj1DCiu2lPyKuA4+yhtYN1Y

cGreEtMharttznHYTCQoZx/RKArq9bYIAMGlQ9w6r2xRkPewjBngclA749F/klvQjJsUU00j2kJSJhCV

N5I+E+qzrTw7XUVqs8GBVkvcTmolrQif8s6QMNSnS3
6Ko4JPOAFkNfxKV8B5Q4Inc7Ql4TxUJUTI6P3n/YDbcD2ZHGNRcwTM0ariniVX4r+NOP6rD3TWou1a+C

Qk5XG+wK95mCgPbfPz00kfOH+iALMDv9j2gglKUjifmXA6EKyt4brbPzo+g73EWFbAf2APboiBgbcK7w

qpZqRtcIrWlJq3ZDhQ9h0TFtfliQGAqZbp6SUTqw6O
1jsAjXYdmdS5cj8ahC6AYuFE8blbD0PxpJg5aGxjcgMkarvK4VRUieZ7XhZS1QuBpzSvaZTTaAAzF7kK

S7Wc/+6clPd7/HbtGI4H/CTRzba79dAoW37fQ/LyBcnSgpUuxv+jg91Ku2OmYX3e7f08lO1SPpPlmhYN

0XDK3mxbv/tFZXPC9t6TBZ5Qrr30vqTmJShpjm2zsL
mnUjEmTvGuXu6pIU5cKUsMlZwv8ICS5wSjWsRNzj/oWBZ41MBq30PJuoH0Tri73VIxHqxQ+lEF3637eQ

XGCVlfJP/kCnSNLs2NSGaqWed3GZAwNaXT5IYTt5b+oIuA0jRJrUjFMm6Mz4iccwJUqKwrNRmYYELCgw

ccGJoklbtbLwrHV/K7Lwz/zPlyqnAJlDUQXjoSeeUS
8AlxgdbslKPYh3N26r+Edgcm1cZZan7a8oXw6cwjZ311AoVkU/J0SWlcWOss7x8kQe0RiyvLSrv/Aaliyah

mZrSoTK3I4UfzhqRXsB6ZO6nyRXUEWbViqwTte5242Xb7Fpz+iZZ9FV/AnMK+2XsQSDfr4uwG3o0pvgT

sk+ZngpxtSpU81pIjUmT6iRnPUjF3mnhSAe2j04han
XkR7j+vFb9hEYD5jgIR8VEdRO3AbgSryC3K28dWMYhMErBMCxecPJUiV6NNVEoYyWBHJtqnQZIlkqFAX

FZ8LxlKAiDcGzfAqtUlR5xzSo6Ndr69z4BzWQegRij2QNK4j30ry4shwCO8226jrG3ZJOTXDd28sIWIU

L4/cjgiLMQcFFzcy81gG1+FWD1aX0Y6U06VRK395ih
QUtsroa/sjDuB8q0sVCZOR2wSX6MizYCRzdG5hB+pW5qB3eki/6sLP32H+iIBRPMnivz8ddiky5Dbn+8

xIA4RDqZdM4DIVl3wbbI3HC3gdmzH1q6aj9ZeYs4DsJV5NsvQEYPE/ypwFYyfxZx9BWSGAk7GPzuGcVl

e2TNKtIYZdfwn72u8X8cgJv1M4m3j5xKh7z/bFIL3Y
hLYSdtvBUPYPBTr753IY33KpB7LAnGD9GSL1odE6Pno0YP9EdQ43uwRPisYRY51XTT+pUmIun3TiS5tM

NvldifRloAFZjfktxoDkIU1AbhIEHlWEtEKQZ7ftN+7zbjDkn/GMSh41Fs0pyF2ZjsmnYjtMBxO5wFSD

KdhZVFf6XtL6jZMc+0KoE1R368gfwtM+BE9D3Oz9Yg
0LYPZJYYy/RA2rRebFHri1brTH2u66xF9pEIxOdvq+kAqlif7fWR0bqCTRjoMMaP6G8prpwS1V+NLe6b

w+OUhBb2EsM11jW7C7RWhkTnu43gp6dMsxjRxDG2H8V+4u8RjwVZ0ENcTgpon98RLK3QUduDe1GIbUt1

PASCAtK7N5/lmycvZbd6dPeZJeBite1rDD0FivXgke
mOertz1nZKEzGusoLg2uLI8FtW6p5vxz3ZNSw/C+2609YJOEJut8T9pDsgA4eC4/OR9Mm/B/xgiLrh0i

aZw6XOmvUL90zDWvWRcYmn2VW1LuerOGHtttyYEjO7w8ktDpZ7wf7mTx7p+WGrCP+xqg3a3bO/yZCqFm

IhOR4D0I1SpcJgm+VxdtKMdE+yzXP+9bcWPaGtlJXl
JXidxGJBFY8TKO6m2vwq/x9eWiA5+z+IUcymxhJEys3heOCpcWiad1opmR1wweFwRH+sWwNps6WSG/Ba

mkGDovy/Z5gtdAUDuwhBADocWIkDiRjXYWx1Lp3jksVPz43lRKJuDPZqLwk9lGUbWktRalNE8PttFEYc

kkgudujIPzQQk0zBzqLn56IL+ftlGBqQ+RMzFl0orz
wwY9jNEnS/S4olCb14Lgh4sSC90mifOUsXxO5G0r/Qr4wep4uVwyuaSjKblJKB15vUC0GiPNudHv2qyt

bQY79ZYG8VLL20t5StDw+pP9w7Uc4HstMUbSSD9bLi/Ao2DMSo6NZcvOjkVZ4ArZfiRPe/3Q7cxVe43G

PFhvCiRLcRw9vaI/AsPsjPeiT7VF/0VOTx8yCuGe2t
NhR9jpYuLuwaUVNoi4skKQiydop2KckqHotcsz+NTnVsGqd9L+NqOsiJw22xn2ntWs6RkAQ5MMCYXSfH

76CAEua7b3TRWa0Hux0AtFHITA2NUPWQpfR7zrD3YjakxBd/E2NKYcvRTeMjkOAtYj7Q5B3OVtvP1DDA

bU+JW0lMVJtWYTOoZcgi/pioIMelbFVMB771unqWTk
8mE/QVmncEtPXD0rlDgVMLp3BC2vB1MHw2o4QFdHVgG677KQg+J30B6b7GylSQeuyIAicPPrItBrhurD

OQew/7us2Q86L5pUF/4XhnvjhaH7ba5ymJKri0sDDv2H09/yaZAsvF0AUbm0NkNZGEH1hzUkS9BtQRq1

KQcDOu4hbwyaQF/jqjVp2tmzMMLs84uTbNk/Qqc2oW
Fm2Qtec/FYyCcRIxGR7Tyl5sAM3UCYVDlJ/1snAC2kISoMkowxcK7hMbb+gXKYVt5X3jvHmfS+L8ciPu

RRRGc8dXW9at78l3D3JPM0pkdj0L9hs3vpnq9dMlNodQAZxcXLke7bdMOvUgykLd11m1w4RXbGPPArkG

OT8EJCKa6JPtZF2398w4CcpdjJzRmrz88Pn40TH/vG
+XATblpDPrHyf1IrlXqqr6ab/j9aG+XKYmYRUf6taUS4pyTdLLJ9LHUQ6RXeMqqpaL3ecwMZY7PO/5r7

atk4dSB9Pi4Igoory8uDyvwQzyQd/s/z4LLw8IFSSVxR4bmXgZQsU7I749v796uM2pjjORcp5UfBjQPV

IvgjErXMFCuaHBpZEYO5Dbf9+4jvhHEgPn0rywF8+v
B7AJz8tmaEQY4thZvDH4QS2IfcVs8WIKzshotmHShClDNRB+YRT/9tuYqT9gpaAZaMMlciXyJOo1LkCb

ndMHOI2vdhUXhQyYF0xRpBcv0U0VpqexpAkqP+J6jJGqVSte9gcw8OM7lIeODQ5miXocj7VidtFcy3Ex

ApQ0knowg/QbDgBmUHZdZbAMKM/rPHubU7a6VAv3ub
SouRXn+EwVJIN3tybTP42zVy4MOn8mIbTJEIOHC9NEo63CM7rtt4dpW4M5QJBoSw8zUNRwCqJBk7EVeq

PGtbeg+eswn2Qm9U+8rz3m62KT9iddtu2I3O03PcfaudJ35rhGnu5Ru/T2kD0/frNxnrtGTdJM688nR1

E6HVEAwaBvCvWzyXsYNRm0YBcAweJ+N7JYXCv982kN
6SGH5ITKEu1Jrb8VudpRkyXywA7X8UfbMjMaXWUGW7M7zN+c0rREqFgi2SWx4OzUSUCJ3W5RKDir66sY

u/+ibstgcRyMI2aGvo+fvEazwZm2fpxZzz2H49QX/aw+qINNoUU+m9XUCNaRrM7uUuR2EkkO/PYj4C/Q

2IIkHN9JBW2yc4ZOadCMOM+2dKqmn0rlDiF88dSwmk
F4qciZHl2hO7fYIJeLb3CWuQsNupe9ADEZs3gdW34VBV45Nt2J5CCnSTzhkuoOs9QpFiMVJcA9rNhPxN

EY6zV86QsJyFcASKi+8GYbqqc748KFkIjSDmcdko6KCPwtQFNQp6d9lCJsi44usaC5EVtJSPpqIeWBbR

ZGiwyNeN4kuOfDk/hVA1l9FQDoGccETDoEcGKz2kXg
qUgeqcL7D8QsZR0N2l9GKyhL9CGImmQmGo+A69Q5HnMEoTRSBw9aGW6yWOCZppkO9CxUPIlpLZ3OSiGs

bDT5v0NBeXbF6gdVeOhLM0f2QHdUT+vLGPxR2uCb34R7wJlOOmXT+KuMYg7Z9OpyLmTuQe0Cjv6XRt0Z

6Bk3DDsF6bjJNqhROHcnMfahQpvxHXLSjcf4BvW9jY
VWgkfiJF7CSp5bx/6wqnG6T/jkb27MjdW7Mf4UcSzKI+FcOT8HX7a64rXL7Bnqf6jh/xtFtD1LNdOgUR

J0k3ebOhYam6rnkHfUJ4XHgzs7JC4gkORfCWP/szfBYRyBdmAkZJa4kaQVYP+ST7Xjv7MU5bm+ITYj71

iGcmrSdc98NsqJZFNtl18M+918Ajf8sJTGijjkVya1
90Si71tlOLl2pdZUOaBzsLD/6MsVnpOOQ/LHiBVgmi35OepKAti/uSmo0UA8F7bxvbTzMcSrPfquc+Vz

4aBaM05wKCc4ZpkadWsoo/3EChMO9vweXNxWA6s7wSOjfvAtmADkmia0ZjsWCHDt/tReHdzocOAV0uBe

X595I18dglsR7XpV7d5+9SAOUwWaUM6py92873UVW5
mU30AYKgk4m5Y56FczVyjYZx+dTI1QtK3QBBUYTG3dZl0pCoUpfI7h5tfJ/mK53+2bKcjHSh6teWgtQ+

OGrjcy4owOfZAPVW7lrCVPoBkkeH21Gd4ZYmQC/z6zt+52WO/6XqgQG6vtTGcSl7T1uY5oNV6csW/7dU

5sro0AMDgRBHoPjcVNbQ0HAb+1YK2R690jqQfHl7e/
4JhW8+BPC81BzqgV6xZhKCBYc6I7KaRNUijAA1Q7aaYth3m4+7GdobNaJVJCiihS3pC94eDfQBfKzXV2

pYNAwQ/QpvH4RJXhic45ZJvYHTgrrtkK0we0vYuwr8gIn9tV/5X0UQVrEINlRdZSiyrkFvzqUNNKoG3M

mxG7cjbVhWqSGnUd1VhFqpP17prmR+eFNbrvtEeNeb
4+kXPBPWzSbLeqv5kSfu+ek7jfY3gEzKWHfiKJpebtxvc4M5JOVp6J/okZydvWK4Slx3DayK/SuU3LP1

JEFOgQ5+JfSRi8cLIVr4FnsvrhWf5gkC8KuyprsESCbO6oh3VZsb9eoz5QF2s8EHDlL63+jF4bk6ioyI

yGB2FORkYpgCzxb2oxgcmmFP8IWtBvKFRuEuKcTR6o
jcskIJZRmD6iaWIYMgPvuVehsesWwuAzjto6QqP5vzamLpV6k4HK+fxK/6m+o1HZm4WVGuaINmAdDlNi

xnBrHVqhrdDPGiB/r80tYnJYQU+jIPBnw6SClh0PefFmNPPmwUjdAV6RhbOuptWK3LT1KFmN05LNrJLG

m0a1tGfPf2Aqbx6nc6CvF8q8qcJspFTQZasnN2tjzv
rvtnkJ6bemzXFLt8h9G2ykpHBZ3SIo3Kzx4+t65lzqisiF6wkGQMi5U4GA88PHuB7RCmyhVZ0tvW9wZ6

K2qiEtlLC9B62jH3Z5eQ/GH5JKfTnM8Fn1jxDIDiUyZJFMtXExh288AxOJwrkGFCY8c52y0JB0BtTywO

KvX7dr3paeu7wy1FAilLHAgcAaIwxScz9MlfjrGPEi
e2nCgyG95gSSrpTeJ258S63i0/xUnz0YwzD670McGbiylwEG00rny4/FIyJiwutz83wqYayJy9/h73Lz

138xdPKC4hQv5kapMv0DofF5qJOtx/QM5cXIvKDUIFEcxb7yWxPpTyYTsmmk8uulBXUOcmcBVx7gamwP

DVuVTlq3wrmxcESIQ1aw3OEtYtPLylBmmTOGQ3dRJs
UC6fe57SveWfIst1d4j87kgLO48y+H4I9OtuBw73SU78EL5ezp98+9CtKhnUc8lyg4jpcUFr/JZ4Lbt2

lwlb5f3qTnxjTKtB2mp71l0ECjvLchnGpiM1CCzv7F5C5xA02mr9PN5+jFT7lRfafbDaR6Mb5a3ai+jq

6HpYyMgfLA9ct8FxevtA7Mg2mhX38t9SxephvHH5//
+gW74pbB/qfngTfiPrzTmR5WgIJPwRWXosLxrbJGNH3c/XoRFVmRYGPt3h3i6nzZhN5bCZnk5ugYqoJ6

VmFladVlczZu3Pl5MbGN8r4GqEoJnVmYJDPEM9d9Ds/h4mJX7lMhyKrvoofOZlfldldMN/ynFj1rvUL9

V/T/lBEJr1/pJEVQ9OUDZKDKxmlqFhC6OhToBzgLkc
ZXrOq6tssrGRmWTtEEe7gTEP++TVhhD7PSRkDJt3LMzz0t2HGYSHCHuuuy+pRUaXGLY87KvLggGnezvh

vJUDFmc8kg022l/njz3fJ2l8HIfanyHsdRHs6XqNU/DiTGTlXGgqrWF6EebJ9CJvfL8UvR2nxVIwrSU6

nCA0/N4IUmO8jhNvA2g13gwboAk68+xDnaBKe43q5T
/BJF0oq8yqRpABIxqfxbQTyHItfEATTtzTtIzt0X/yS/nklGolWjm8KYuMZCb7Ch873UmI/3Dq85vScb

Ul4fs65xvQr5hjogU0lvAuKOA0Qij6OTWsxZJuMd+cUXUTvnvb9vu3JQj8cKgKM0GVlkL2O41a2/eh/c

y3/TkDLT/WAnhjbOdOL3eaoY7aceytyoTzZRqiTPCv
M5Kwm4/+PhSXoogLyAOWDmLnNrO5kWFTgxMAWcvu1hKAg/2T9afmskabp/Zh1sNHWLGvYAg3a6AFaGyE

b1boJKmYeyjgE3s/LPYe8elN4hmoV1sIr0M2ZT0ESiQYcfjQkTKb/UotZ/uATs8NNqkfynpn7LKeHCb9

egD9nadWBbEITfnacnFE6+I7VV2Pxhb2W62cd8zFK0
PpW82I7tP5Ucf6+j8l+ib+Uv8LABN1kkoK7k+X+/UaXyLg5WT++EZcRy+hrfxQW8E5dSjwUt254b9rJa

sCjcxuBVZ9N+ciR7rR17U3d0QitRNez4Nqkvo1SXuKztnrx1h6MaxuBeerLyyg/GNA/rw+J8QDzYvxo3

vF/8ZZOPdScQb1sSuiar9RjyG+adhoQhD6Z5ciC4j9
+CSprmi18KW8mQVVZapT0DHaVhquc0MaUJwKFiT4rVehU7WKsif+XungHVvcpaT0ml/rb2tX4o+Eyuzq

41gs+fSXOJSD6t+OginE7jbFWyKYYrnntyQQdU2zG+VNB2LzwVE5zU/u6DiIfLhg5N42v5rWF59ybzZU

AYk3ef6hofn48yCukjpHa/bnEyUjB9SvduVAEKGMME
fPTZUOtddxJPL6oN6GKYZgbP6sXUilT/KfGRp6FjlmZ67yPYofweaaVzk59nR2/f8Zkqf259tpFSVdbh

ntonHgdMdu+U6uWL7W//a1z4yqre+l79fpMlmUooPYF69M3OxdYv9KNTxf5YXyaAPsPTIsjokMqOFpVt

CzDVpIr1QxLJG4//GWecc+73u7lx4o69I+du467r7i
vP7/z734Zf3iJ7U1v84qhVMQv8a3sqJF3WtSaIb/srPrT9O6OgeNRjpynti7tM5bcJKwhoM6g0L01a4q

OQ3Ry9+hDqYo6EncbuRw3lQt/N2wsol5hoeD0jCCGVOwr7TtlQIfr96bQxfJIhvuZbc54UXJQoFDCvh2

9Ytn1CGONjw3+emPugc6IF+1OAT6hHuCaIpjvori+i
U5julai3Mj2IOWHNTLQ06P7yA9QfxwZ0pGoIEPBJaay+LotglyJeH4ytYy0ua/jhIM8v+uAF52hvLsw6

LQ/6nvTRGOj3wuKCuHWzUKurltWR4hqExWNYUZDg+Cx+f0DeKkzcsdEAl3TelTA8i7vNYJkq6SGilbP+

3OG+fl7dmr3IB0urlfHZoWJanTtDmOCX7+U8HAiPkL
644mv7oNhKPtzV6GGDsUEuK6Uk05mvhRj/zktyjLUNIv9cBRnZe4YHUjvReI/cH8YUMEX2fisJwH6+Ed

xNymqZZVaq13uRhj8n0tDfgGdsTtqYkwsuN6oAxGxnmzaeqN6JrMWvcck5+0N7BS2IF8YlSv2V9A7vcU

+g4Yrse3DsGh0vvkBGb+FJvZ0D2atpQENbgEQ/m/ls
ZJiVRHiTKvaKITB96U2LPWXBFz7QsUwkfZqTxcurxVX2fa2MFZ42zfE+KdLF3PsQW3T/68xY/2vMgH3z

CfMaUCyGE0AeU0dM/+DU0t4woTyEZ4+AqOLFmk4repiJOEllPdQ4V1rhBn+zEmB0qaP74OM0nTJsYvEJ

K1FDlQv9BxZZfwkox60/6J3klGjQI58BsEu0JCzckS
O3uk7zZBW5AqF96dMdNBYdT1uFohuOE/JRj498eDlhwRBBt2SLlL6bAdU1ep7mktBxppH2XNlqLM5Cly

AayJNkQ2y7krS+I7gJw5nNDpsmFntSU5SoS927ClsgMM5ptLy1Qdq8CFQr1gfRHmB/uGq/xkzZmcmzuf

br6hdiBFb/WyQtoy0JO2gQTgaN3PypyWb2IvDMZ9/l
DFMjX/yOJvh/63ujhHNkBPI9d1ItS+Y3gE1KX3SOQF9XjsIGY6J0ig2xP/Hw4/MbTlyyedMkVOvVktrE

DGMxfqG7w9kWlUUeD+oqUbodBclTM5aaDUdqryfovwHrozmcAVB3dFhjFKRnQHodgKTPuJmPRzvd8ylC

wa8CRgM0nU2Vcq6kiWVqcYCeJN4oTAzMjbV4MRZXOj
Mu22qQ35o6rf7ksfCxH3s9XCGXK0gDBIV+kA6xrgxQZe+XcI4sLfzFbiwHRetSOIWYuXCWfaCKdYqmvY

2uufQYJIHsO3ThI4j14x547sdkZ8irTzdBl/P5pf1u6fk3e4NRMn+u3LGrT074prNIN6lUG4mRkaNaJ3

bonds/9Ru0WykdhOkt4EJaxRk42x0x9D4JzxeLEzIDE3y
klAiH2SP4l5781PZFWYHlPxQsW0lt44n+tK99LnV5dyz15oOV6D/Nlwf8sMmEqXM2A00Pyzin4IUs29S

0QCsG6teqtn1foFY+eAKbdnKzSHzlmZBlmF9pntWv7R3Pp/9e2Ce8Dh913L7/Eocq/CtEC5m+gqtMeVK

18C0mpv2H9/y3OqV3/qWT0CbvAvhFTM4/ZUM6+lIlf
1lPHHeyKqCQocyCudrrL6nfPJ6T8o8iYJzDd8NYPNc9HlDzlxyAtbWv+3Q4rVaHaC2lxjHN4pYVcINsv

Snx5b3s7B2DGOKmUcbhwJCAYNcQsZgOOzwg7g8OnVtIMU6kUj01sCn3B/lhsUbWzxgp3mxXB1n9HRJMX

oxYiyFg1wpoSHHFWNQTZpMXWrpefo6PaqGOYc47/xT
vvC5IVpnogEgZ4HDC2HhC+VpLY1WVuwmLxU2D4tcv9vfh+CF9qX9ZVYB+6zy9a7Z473EDtCuJWQb+1zY

1DVYV+Fz4FjV6FanmjHt/ZxdA2h7Pqv0h/hZTpCXUARTy2zII1TQFId8X8lr3vbnH4kRKibtXZIF6H7Z

kFsNO4N2A4IqVjxH17taxClGAGCfRCVA4NOy6TjYnD
4l21IVZ+j0SMzT7pCHXE9+8zZljKhKQCGfOYDZF1XZZcV//thJAXjci5bA8qsrD0FjkAzB6FoankniDo

UqnlesKp+6LFi/NUUysJdTPQXAdOL4dXsCLMXXTwjKGNbYfYasaYCS9O/ZbIOV/Qi/1uY16jBvFZzw5Z

sG5oshLegGLo3Gpg3OpO6u8jHuXsUh463zj+IkdLGV
ap29TsYbfdZbtGqNEh/VuDZ/uyMr18AR0yHLcl5ag/0Km1aoaMpIvfgapaaA/TApKlhTMVWXHuvCEdrt

bNctiR+tnJaYfMXY18TCSSCR5XpgYBF6pPZpziqJ3tcPLhX4J5Ygv9vpikBco9JdNRdkpHK5qU/Rgx/g

8LQgs1MNXuFAxJvuVlSGepNlVUyabRnfvvkc+AnR3M
NsWw9HVUJ+HH3lx6m/0H4qdrtXjko6vdzGY89pivOsZA1Wd0GvYMwPEBtRiJsT4XIjQbUtDiCQMrW2+M

jG7ZJBB9eJS+YFxF64S6SvnaupAZThrLZsIrVvG93WUluGGzTu8SUN6wllMzWA2aSSbFo8b/Mk+nMsyp

E1lbeRgePpL2iD8P8vUTkZtGpP421qUVifk2EgMt+2
3BIQSE2l/H9xRJgalqds26NgP+/cTD0AFtDV5/xu35WdY8/NATc3xad4fCBsYQD9cv1e3OLGFh7Ktv1I

FumGhg599ajYp0n6zFGj4lW+Ui0pY0AE1EoRXlNoanESesiddNXjiNkscvnU6dqrFiLUGwPP6mTc6MHo

mhxh9+PWO82mNFdN1J2A+ICgl7BrQ4i2uIx6a0F8mk
LasvQ4pgDY2Q/5cH5PYT9cZdySBjMv3vG1Q/7JfyYsVKxYuj/s/qz88EQJ7iiv5TAsl5KNVdKToBrDsm

3IF4Cm+d64BTsHgsy3G5i66tnKVGIEeqy63zinf5b+UUOGo1mLMco9aNTtakmae/KYFfTC9d5HXa1nuC

ETV/pok9BC+nvXKUYOi1I3chtYpkI8TJfbkO9dz6Eu
+f13ih9rXf9Ay8o9WshVFSp6HCWdnetbQWUdh+bR0wclIfyac4J6Pqzo/iXjk+rYzQldzFjsenuSzjt6

A74s4ciAZYnZAsiaUNQWU6F6P0jq59X9iunP7D9V1tZPqhQ9eD26LiQrgkSm9o+rVoCw6UrKxUW9DsGu

swGWJqMzrfW6ktWPASAkdpBtft/PE+qajs8LJg7S58
oa1s/bUIwAFPGzrdpjHYZW6J/7qspdnnQt6smGJoIzCjngwOBNzfwn1hNT/lgPqOR+XVCi/Nnz+RKffJ

DLN8l+aa2wr77knQmPfV0BbHTjsukL9ZjodtyaG7em6U9z+YPovw9TL8ulJ+IpH4C035VJXiAFj9nxqk

VFuKJL/ny0NRyWDA4Itox1hHRdNmoKUFxqDX3RCZHP
ZFCrHp95iQKopOi3bjdIJU5gdd8iggqTFXcynfpgyuyLGXZqwnS613r4vtGzYatuSz4npPc9T5MQnaWn

HDezRYGg/Oaokazczsptaeevza+UyU5A1gmcdPiUgvcI3dSZQ5vOfeESp+M9qCH2uODfgK4k9Kv90gIK

O/rjJ+P6A5ELOP1Uu3VS+aXUDQMrTl13BlD5xwWMfD
GRGIZ1N7mZ8Zcc9TcD5PFMCWpQ8ILk9TaAJVxEc6NdZyzvRfVdcUSsGLEvAcz68DXmCgVrktzL1635NS

A4qE7dEEg/nw9TyozKGP8kFO/u/J3Fo2fglzYCqCdIoahYHsNBMVl5kdqPur+SC1RMnG3sqGFzrSWVQy

Wf2fvxP1jvbEJZ6jOv+xlBrMEJNprUVIaKEBn5TN5b
2WsYFZNFeSl+ji+IXr7hVjw3PUZRTXXh8HvnAfAA+x1qwzlgVABkpg9ut9vlJak+/hw0x6YY/dILoO5B

yYQYwZXJ7mWE7AYtFe7M9xCMfkTAHmf+jjzWCiLdbMUZKdQtgLIhfK+f8h6MGUKFvzCD0DLWX5/+a/01

uGPrxW8zjnMIlfE8ChU2h3zSZkrKDZlZHTJV8uGne+
IKJ4QedIb6+r0JKDaOuZiAh7whWh86LhH1Ri7PJzAsBE7skvZwU6GDVHJ4aQuiRHdoVHVfFelP4mIHGv

JX3MGcu5t3ThGQAcgsF3gO5zC24jgTFQAFQKE0ayH0x3/O4Es0jjy1L+0q57vrWKXrx2wpBIG7Hs1Aet

ySSmuIQ/l8g+nSciY+cMmmUMdF7cshCIdhDFrDdAR4
nIIpsBwje6uHf7oGsvmmyl9/PsP9zQ4RDLg5lgrl9y5nCfIZ1RSEd1pLOsQxhFKm4B0HyhT2iZs5afDP

Obud4Fzc+LWgWxWluWJD1aNXvCFe4vrkCV0USCyNVNgBmohNSshO36Vv0S24D5VtU4fGkMSei64c6Oiz

6WG55EkpdRqKREHvdrV2JhHCQtT93W6jwSXujTA+Pa
q7imIV3r8qoKaQDfmjIOT8sIN4/XYPWcqs5ZR8Yw5d5bs+1tS8b+vKOrGI0B/8YlMfibuhXIHMo9LBsA

ZJmJ5vGrS2JGT9vjb2vW/xh7MM4oW1k0J0vYGPIi4cm6fQRkdBTeO8IltS0JwV8x1NYN+tZnhO1GTvKt

flkAESNn6v68OIZRUmrEFdLLq6v+e6UY0i4EbqvmBP
Wrg7IqBCybh30v4yKHQqC6iqeBekaxCPgEFqd+LoLmPAws8CkkXQD6UOzGtEJl/xO8I95tJb9mb+hBAl

luydrUpA9RGBpVL5LQ8QPWH0S0r/xCoek//i00KeWeQXFvwZ1PFcpy/yI9lJ95WrOtJhUdx5PTGG1TDF

DMrihFmZhohBljEMW8yJ0VP95+Kr96PrIix+5x8DOT
YhLMNjhhtspC5grkMyVgc1PkQiJjXVYYX3bRmWmZiR9cieE3W4Pzkgqe1RBkG7qXoRLMH1+lXhQ4GA4C

6x5mE5Nqwbm8qHpEPbgT6rrwoechlkyJPdopPdJx1oi10xZfRQ0dyKpDFDseCqX7VNIIRi96fCAmmpkB

CbEXfZ2mbhfCpguldx/Z0igco3ohJbS5apr/CR5EI8
sv5AtVi4iTTf5zFZRatwL4f8DSwieizaBJWf5Rhlz1pZofyY8AKPl6hscHXedQ6YU8xS4KbWtMgaN3U7

y9RlqiFHKL0taHa2GHjY8ZbOE2TD4s1Tt00P9IwPJa7dmS9dWtzRsB9D/n5R9X39woseHEsYZ+qAxv4i

NJULNTn7AJwauIt/MgTtFkdmCZxoMvUwmUG0TOORwc
XCYEvc0JsFP7KSqMWcfVSzSewQ2kzf3IYNAgLop++WhQncHr1/vDUJf2fmi+5Qr3XkAk1nJ/GJtmhGKk

kLtaBojidsRf74rzP9luNzs4hIWdRGsZ7O1jl7XUwEkpnWpdAeQ+Q8kdT+7uN0T4Yv3Xy4y+eqCuXIXY

QNm10iDA89e20Hk2rYFG4XVMeejrEKsrxxWDPvmaLQ
CKOHPsSzHzRMx+aPbXVEoAwDQYK6Ibi4e/c9knKMY7E341ne5RJZRpWtftFgu13ovuUcMFFqAtq/7YNE

+pTN0PCNJjeBU5dXXNuYlYaSKhOLgJuOifEz3ehEw5v3wD7SRs3qlAoA25gH0Ln4KktEqSCn406qHZu4

jVlUTwXH847YooATAKSwK4wjHIy8IVeXG7aQjUtUnV
514uentJddEGCBFCxjHdw5TBBRSsQPqvxtguPDCzW/Tzj0oBzUGLaWP/GDT/7zM4ukmpuuTKLGxLH/lT

Q2ghnw32jurtnQVy2Ktaqn2STKeEmpAfAJz4Hx+2Tu0ghwdYrvocNmJI/NtMEodkYN7t4em8QxrFO8RF

kv5gTnlYyRraeHj4u7bIBj9/pk3uPvhsXdQuwMxFHe
TDqqjzCbENljbtK9YzBCDjov8C21jvGrghr9SEHMJOTlvy6Fhu2lXwEBa/i3tER+2cxL9YeE5+C+LGGw

/IgumplJbpjl7jXLQnBnYvgcAhRKAE+RK7LkuOtk1Tz/SlnOAYN86hHc9chqdDzh2w/rN5Y2aqoCY27G

OL0Ofzm16HOHeSquBIp1rkkeYtjfXvAc83sCvio+ly
8fv69WFEu/iQlcdb25FY0QX+PkroOHNabuER92JTD43xDeHjNZhSRPQqOVBPvxdRKTUx3zyUsWxezJin

MppPM33b5HctxcMbBU04q6i92om6miPnssYuFmfUM94id4bQ83m400M8ch9ZZ1PnZvDFI4vjaS0mhZie

qGTTJMbxMMSkVWvMYW9ollZCfF2G+XuBo/jot/E7Ca
HYDDvtSsoVGvHOWdud5EHqj8aEdfe3mEmEcQb63Jn1EViyiMsV3s3hH9TAtH34tHf7Y0lJnOjVea95Wr

pUd875taaxk6zVLDnvQpYNUZfqGHxfYhMeVipgqf5YII47KQnqkWmh+ISBk2vTZTfKEsfZhdl3/aBgJL

2oRggIenjIwXlwqEdudvTxBUD5ZTzWF5WJse2XSj2q
tMpdsVySq4dAyHBJVGnKj0NIgSmxmT9y5usi/kAQjlwSo69m/tn8hs2C8dRUWXdH6/gPBPh67XgADYtA

k0xlKYJ7mUoHH5FwbhgPJjgr8573BP1Zj9x7VkjCpmSHD4egbg5vHS843fqbm3Gs3InNLvTi0ZUIldmz

a3dDmBDeyObNea35UONPBgF5enV5mkrWLJ8iLctSft
FtJmt6/CzvGyhG5KMloXN6Ycxd3+6k2i3LN604POV+QoibUYOG+8WP8ZF/3/n52zTK1ofgxGDacuDEc8

VayehcVdlNJfzACW5nDWtA69lbjnPPc3LnLG/VrxdT5Y92vveQ8qypOAkpZ236NGFOFR+3KUHk/uYsae

zpMtXbkI8783Zuz1f8VbQcqXLXhzAleEOQ7UvPEQlp
wUh52h6/lreByS2DKI24JGLBlvMauIUKXCmhvAZCyMj+fv8B6zMwDuGIIfuKU4l8dNWOPDCi9IEZ4sNm

kS/jD2vF0W12uTZtlHb1rnSa+o9L4QPGalg1l7oERXczK8yxwM15sNA6k+6oKuEI1V24ARMEnT0W3Lof

UlTKJClHbEaDcblxwvPWOkzOzCa2+GgKqv8bhFZCRT
AH4iJRM3A1MfVF9M4Suca6uiIGnXnQIU4wTzPeNnG4YulhxG9aTFP2OAxurZA8BGUSaAkdKtJhGzY1nz

5lGksvbJg2aCB6VpsTA7PLkvBZ1w6goLipFFqZ7MTomNEZ6414U6UFSLnVUid3gjW2V4GIRY98SrKfIY

rDA5N710N4O/XrqUc43Pq47DWF5Y5HrHueOnf2fkoy
PqySZBFmjBvIqyr8Sczj8/d+nP1HuaU3C68J5Yx07vmdVrFZOIYmvfdgmXyc+Jo6ds4brtSi0vAoTfeb

xk+kXUrV82kKSl8cJ2aLHsNkRYC8wKeQ74fchBrkSqT1NCb3xa14HIopOeDKFXL88+l5XNGrSzxXmJu9

johana/gjQlnKOT8mJ95o9jl5qDm+fZOt3qR3krDX4j62
TakTFcGBszKX9ywt66sYdjEOoVvaea2sZZkSF4pxJ44CVTPEecGfjWLn6Y7pmA5zTs7OGIZgDrwnS0Jm

91Vl7jHi0rYU/lAE52z/qonYxH57P20v5s+z/zlsMNo+IWp3V8e1c5GmjF/fHNISkshg7Jd5299T/kN+

ZqPnNZ2sTadSWkZg+QrzEP3pGSmVPrYG7Aqbebvy85
f6f243WD7+qx6YBLupuG793xR2UPRlgQizi25NzOQdXDdbBf35Dpbm4GACecMi3WDuHtX87sy9TgU9D2

qprk8Yh7GWK2ROCPVqRt4Oc3m0QVo0MXR3MkiyVaYvLco7SmEtBa5z+oI5AgWU3khrkF7kEuIWyGbsqs

Mz7+yfxWTQTYpKa6EV8WR7P7GGVknQs85GLUIUZYK2
pf5dafiAqcTjSCP5sAaW62kZi9/fwh3UBmKaNwkQ5oBDhB6KRbAMENqwk/Tz1mQEry7xIOrCBGPrzS75

yMHSlcDABSia10QmAVZ0bI1HeNQqJ8bmdjEoBL3P6AwOLhw60jLHg+US+0viFaM9XvQKUpWO9Etp9P9q

SW6GKOT5+bnAbdzePGAu1FxUw4EJRyOZ69xe1lRfX6
dvn8SjDIyih03c3jGeNs9ftbdG24ZdmkUUkglXQ9zTS6Org0+ArkrEyIlBuItyGb86gjm1CJG7gnxieZ

qA4tAlIwvt94hTvDTEwnlMVmti5/Rhqsq/OsP25FEp6nR90d50FAgQR4ujBoov8Q/yCLk5Zu/zzQD4XN

/dAg8mPWMkXuv45ifOy1Yyf2aB2QqwoVLCEkssvqSi
Y9TEuFxdIjpd6DVDoMUk6fbTgCLm7xpXpaduOR1hjOPrWJnA7izMN8VKNG79Pty07nz37/tVpv8cu92A

afP6VIdDCyUcDZjrgoAXAt1l1lZ8cJ+8pZhwrnpLZryFBC4pWDA0MHGjHqqUxcTIGWwUQre4C0MR5YYO

muCrcO9ZPP2wLMC9KaMNbd+OoWTuslQxO2upP7/NNE
yYIhdH/qzDfeBDAY3XRCCB5gK76tBkQRODmU7EqDAzb2B+xzEuNiq6jD92mQSYt1OyQPb8byg1NZ2nBJ

nnR/WfA+hJpZotExHDKg2bL2ugxBNTkliGfwxPWmgQm+YDLG9TbT/fTMvqePut+ROHu5t9fi8XSN6aOU

GpNJft6uLVOHhGIRYYIrLZMwd0gUqHZzrGnxdsHhmu
HwgEMQnJQDgsHXW08+DkQap/VCHajFBQ4iYDVJmJ+MtLj8To4zOe5ETSuEYiTffL+/LeqQh4L58UV55N

6P8bMIUBUmmXNGJH84EYbAGGmK7S4+z2NyzM3gKJ1d66A+ptkrjZRKgE74O9zMdkdXNGXJqut5sUeg4U

x+zWhyPdqKP3ck03ROQg2dtt8x5D05zs3o8002KRYo
yLSZlCSiVImvIutG3mXDJ7lETNJ5O6jxGK8x/8U4+LF0kPgfkuIj8k2VMa+7NusE/Pv8pFCuyjhX+klz

UgRZRLinW4GyW2Q0tVawCxBj45q5NyfFp7sUSMY2NY0qD/C2CYk+R5N359Wv6GdeyATW08E31DdC72mL

W7GzLPfbgzaw9aTuKxna7iU4b8JrkJ79XvrwJ3+mqQ
UPXQCAQcMH7DXkgh35fDgkrZvjJWqD2HJS25RwoEN29jdc70Yh0Uet/lzjabJJQxG6NfUiNRmBrPWBHR

EA9qgDBx+9go6kKY1Dn3Z3VIkaUcg6UfnU/PUAwXX6dbMFbCEeK1hMaCx62X4LK956Lin0J1pRAuHJA8

gML0qIO2PG+zylr/XxSaaCA0UAnL/phfpxPx/BwtxK
yO1GlFXFQVSWOIyInJShYhKyJR+lYYbGnHEPRrQ/MGFWXJWepB1NeQo7OrMYixdye1Cuv++397gS8z/z

fM8F36ZV1QKcIkog0nxrWEc/pe4HmLgm6e1vgjzzIvR1SfvQEc9FxwMLbpbQt4gFNLzg4ms0YE+4sYj3

YsOwSPDHxgV/POparIza86WcXKXpOl63/1pxY+7X6q
0xJ7at2XidavP70CKKiy5r2S8qmx7J0eAFvnv5MGOohIk6K56ncDD86VMJrpwhjn88hMAHB3FUFFa2+9

ez/XNUPqA4DH+hlyohrw1u4zE3MYYnueux25xGAYcDc/zrLeCaFtcbJRTinBZp2PXfB7zlUkmPLnSfmH

iIcVILc/qBC53UA9dJpqoxpX57mZ1vXiP1sM1GEhtL
lVtRuBMeqhQn+bugFqcoGdxexjXpuw1+Il8oYJa4CUQJZ8ETdnLJy1qJfmeFOHUR0mdN6ApK8GK05CD0

gb8hqcmoqQ65urB9jZbvq6hpwLGyPaq0pW/ZyR7lC14R2krVExWiOse7uhGwnI7j7h6vBrKMETfaAJWn

utpS4yJ6qgSZNLidOnT+ua3S8q6/isR4/2J1Dxd97O
dRvMstImB6Ntt+akSzg/uXULIEhNs7nFj9KShRFOV3v61KszA2G0FaMzE8wjtKa43JaOnKatl6DW7wpl

v4AAd06XGGG9fYVtAmrDkiw9tyXH1gvWcXaYcoUJRQoYPzKbRebwvovuVmGeWTc46jgyf8shjd/W3GKS

8nhIUHb3lSsxEFYcCfL81hqbx24VUBnZNj8kId2BJa
qTcp/2JwWYOghNsurEB+s682FBHCKZo9yYTeRkaxKxAZ3iit51lktktv3t11HaUBkJaekf0lcDh1clSF

OYtP08s/I3M6gL6zgDErAPurFemRftacBbFU2afe5HN/kbkvUVK8GMYuhxm6OWJ9+5ZSVJH0ncrZhhVy

TZL0yB5pevivaXAllbKCaPxFf/M+t2MntfH4v6VwHx
630g9nG1KtSY19wDNeWEZfTQE7bUuab3np/Ex4z+cu97MlUryZCUNhnuw8sQFQjsQdY4OqnADMq7qctw

IuOsMf/tT1Btahxbp+1vLTzkg9CsaqtDQpw5Hg5CkJeNG1Fw87NJ0FUkW9P7J9Z8w8UjW28Pjioj8uRU

z9RsV7blF3jE6TCulvUEPpje6mhTg6SN7v1QKK/l9e
tnbfi1YBhb3p0Mt9SIfnOk47cZ3v0T07qWdfA0ultma75AfKZ4HymhofB09raiXPS+HAsEZQG1mgEuE1

fH89GzMjJG1gdERioexRma5HhgVDog9x/lheUaNf7qlQ+++vBvIN7AkJEcy8qMXxK0FPoU4xxMp1fyQe

/DQTF/snJ3okqF5IMPkxyZT7JkfGzrHfkxDPIsZ59g
oQVcKWwZ4eGO3XoWNEBswH/d6mRLz1OWzHSkYznZhIx+nhbTO2kP2OOkmc8hvskWSqJ2lC7uYKDQDN/M

h9jOeLMknoiAbzW4ftWHP3e8eoqKduKRWlZ3OEA9dn2icP37ZTE1GpKXJVNvqYSF9YRYtEGZOzmzQqKJ

S3/0LeR/5zzVk+pGFmvBYnpuzO5AuM7vVEC/qA1oae
7r8gRGa334jtjGYnWnGCC1OdbhZ/PHL6eb7stZW/aVsVI1eFclC3572WKoZtrHy3lZa2BiCEFdXBNrEE

H2IV0R066eMMU/IvxVn3ErzHTo+Pd2U3fbvFGDeQLD/It6mF/3NF+FLkO9mpaijd9XJRVxW6PByBLo0X

V71fuT8dmTYIBK3afOSvqsXk1tT+iDLQhFO/d+LEb0
gL/c6/TctUtR8c4t8Lq9eQ0auRLReL7/6o9iR4e1qU53jGpV/hLw2R7KcskNz2NNWAsz21G2pfUc8QUw

7K4eZPoZ7n5YkPDsQqCoOogPo3naRxK+CbD08w3jEU9xX9aXNiBhYY6XQLg1v6ZE43s4DJ8JCfCQfH0A

bM7gc9cTt3D5ZkD10HV96e2v6coa/nOf3A9uxl8BOV
/Wa/do6Ds1SHshjJzR6NnGCQu+SY2RBWF6vCU4HrfAD/V71DvQu71WFRUmwwjDmc4gL2nT6ayAZKUmLs

xtspoFmqs5tVudLZDh/K5+CuL3d63dKTP1A9yOIgW3o+fYEOdOvy1qhPrFpifG1UjxeqRZI72qGKtiAk

IyYBzsMl5UrLkU46N5BWwMgO5V8gjHdDRhHeY3A3/N
3Pfa6CgEy1Y9M/nanB8xNnrf3YKk3QuvoFDi7X7hbRVKzWm8johJJruq7tLGnncCUJskMzmLK6Ks2Hwo

IRhDC1fEK9IrZMyxnfOmlWGU3nd2Ht4Ktf0BPPm37J7cgoy6QOuvsS/o5LlxQ1EQzWkvB/dDAHE/IdqS

M5a92eLUzTQyVBu3vyomOtIyfta55QLk1jw+/yMZ37
7lJMU+t4fUeKNdhU7JGzGBzomIbQfuDN62cKT8kMSmqkFHkOdXqpC25u7XO/uhRG06IxyOgOd2PKb5ZU

TfZ9NvkVTxgPS8F+3F9XwhSISeV4A1le1VvQP2yiKS9tBtdSGMk1gzyc8q6veQT9pfs0sC4QaRnLTRW2

knJnnMSDWdr+7xGl411J3P3OxBISR3XkrBP6n+dcH8
Ixj4x0YSl+3uc1didldjX6om6TUu+r+MVzM+t88hDd0U7qDZ5OYlwlaqX37/9fMMAsSg1ihTSK92R0jz

uY9wtUHDV9vRxk6xNsYywEbkZuNfzkmnShj6c9yAg3R/cXZyWqvOAzL7t1xiMdJ36HcKYHznhAcps+az

1EQAiTfoXowV8KG7+EGxiIsXFYjSYeCV6e8++O6BVP
9z/HdibaTnhShLhzx+FkokUS3wKgXoLUk7TotiCtBm+24ECFbaUg0AuJzTtEP8QhYw9D3NwyKG5M/qNf

K/Ja1YTFP+J4qlLsfBH0+uSmH85BeER/IuRBCtC6ch0qJPUY8sNHO7T1NmD5Bvl3JfwMkkNRUsU6+Xoh

tQEo2UVoB6CpOi+DqKmRQxn0UhDn/icUYtkxkDvKmp
op4cM5ehgpKrLWjti0z+Wq0UDNVH/W45bPilq8eCHejagnyWNgs6mm3S3QJ0wj7JaXDfptUEu7Rgdlok

YvPHp1KBlTfv2Q90pXRN1RaSFLGhPpfDfIr/zB3vScUaJW7/R9NjdSgzRS34nqoTU9i/Kokg/5zN6/wB

J/xsl2dX1xDIY3g5W0va/cJ3sJI6b0bjF1/aGuFOp/
Kgd0g/O7mlvVnC885K1LBFVUFsmazlzZP1lrrpgGDRT6iYHmX/Up2xCZ/YqTEZ5sYNn9D1m+6qfUJmqv

kIv1vgIAAEC5Tq1/iPIB7nTxEiDjLvyCfGTmUDzJXdfndKOLJi35Ohh3TMQ3pXxU3sPUzJqFZPzIttWR

0DYCwm5rD+g8sJ8b6/s7D5s/HDD1i9rvRwhnpgedzQ
Mlc1njoy5GL05KTKfKzbT2px32tl13acfKChfZFbtcgTCGFWd8UesBsra08qsGkyKcOX36N+v9bN1Xva

YSI0U2s8ucqMeuctVLdBIzGztQ8IjRdeH+bt3w6KtY7jOfQFvD2I9kFOun/pOO4C7hn3QEDr4OleX/Bonds

25+WVV5jfq+Ukk4IuMlqcNFYtD8rIfWxYCdrEmcQ2O
0o/WGDJH6FVTRfhWQt8W89dXovoLl+GnUM0YfLF8LkKWdsqgyyp2RSY6jz/AdsieXO6xU9fD1Q/2zmP8

g+dnsRd67u4reR1A5+f1ypZ8RcZRe3mLBk98HlxHfI1aIffmZ95SjQeNj8Bc4vrXJ1nYIlix1EqlNTID

liEHDl+ZDmmPA7lAPJWEDnnD/bL4u+3wrlKp9oNq7z
KcO3l9Zu22SfsGO4MZprx3ROagBrULZQ65fWl0+uUJyL3mM0ygx+rPt5hwgMnYM8TPHrPuZpN6qkZGTq

FCLaasD01gjh6M0kghJ0pgNBpKkTpPkoi39+TQvJzH1mRCGd9ll0W8JDwJulowNLx3ybfDhEYxF7vsz/

5fCbUG5BZtSn85iKNZolYnSxwDb9iJncZt0HlzcAj0
hrhM3V25TbQ8yjKo81shMy3l6N4bI5baI5zPOe+nmNrSw5RaLcx3xbBImtL+RoiOC9cITVvJtO/5tbw8

vkmHZTOzsEaGm8wPIhPDuYG7oRn+Lrpnx6ludMA+eTQGCeJ8Bh0N1pMzliqvk5+ld/yP71AmXosBxcrr

u8gywoqojyYu17hzgCy3tjog8w5t+A3SDnNVRwxXiN
/PGizVHK6P5VK8W4pz1HjS1FjFKsn9Pi6dqfjSCQrM1p+GyflojY0Q+LDFWyNLcR/aD6o2oVLLTaQcuX

WR62KuIvR8vaHTbqZhAfgFEAeiw+ASlpyf9L18dv2e6zuPKxPmDu25Clg7KpBgiho6Pm9tQCv8z6eWov

qY+NXQ2IJHnoMrZzBcd6r5RdVbKd2zUQe4yhwlgA3a
IW5iCLs2a43Q/N/mpGxjowCFZlCyX2xseM3tUAuRgQhUOIUcdSgi4llIgbhTT+3iX3iXUU1BHh5k3v7g

mV/CY6yiy3XhmUbZxeegoBcbFpk+t/1lye3LuZtIIiOXV0tC/hTcvyluxI9/75DZHXwuL9p5iqjGzNjg

u56kKTgtsiRN2q+YS1KcNGi8Pzr25NKjmJDyi5eu+S
rEmBCvU91NSybXME6dPUIriQ9x+9N3LdaKbXznigFtL4eC/RVC/lrTGbiLc7PHdd/UQeGuX7MTy6JlmG

dS15tGS4pzPNzdvm1r5W5kawRFinW8xyohd7TwdQLR6W5TNMPrD3wTV0AnBsz5pddf428ttSlUmsdJK8

t2tUV9t525graRHGIYqiIBOeecSj+qt1ax/JbgHJfs
7Mz5MJ4BgBxoY5EBsgFhrqbGpCGTyCPbzUzjdl520z656jCAhuaxEie4iC4JdZY/FsnzfK2GLEWAUS+n

D+30dCKU/wfzgjhFtmYztCVA1YX0owaTBlykLm4cAbR4WN3/GGJdF5VMV5ZFIs248bZOcEJWGdbg8ubg

z+TFU+AwrxpAirPqE+xovGCspnm37GEGKU/dCkNhpZ
1FLu8hyeHykiAXjfuFz4e9Q9sIiGj5neEvUiVqhDZe9qb1Im+5+i9FawNcr/Z7XEVOApK0KF13HQy4nq

Pmdt1pAV4Kh9y0Y2yB5Q5Px8qgtbJhf3VA+6rw2d0QK7NU/r4E6nwhw67OP/8a1bcjN91V3V6UJCiPbZ

yVrD5TERLrvp4++D+s7ZAQGDsdnnpBSFAyljELWFoV
xGum1rnEzRokhxfRClI2aQ8g+1MOc3TA57ytf4nDjvmY9CdG8a9oIJnCm3y/PaflehV5sZ2FyDQimgq3

+Ri4khXsrHLP4dtjnGEFGqq9xIlgti+u0yi60vrO4a00e4/kwmaT28ggJ8NAl7Kgp9nDhwfm3K7wam0I

y7ztvgPG9t4uJAgrWPMzq6GUuwDzLzXKAbzQdPDxLG
cCq59LjdD6Ccy8EmT/ayANH9wFcnKM9wNR5SUZ8CtCt5vB64VQVcQxZxkd+HyeVtVjtQLiJk/0k6Zh96

0peiY9nqjf8IKUxAiekeeACqSsrKWdyOtQvdtgvJeCO/Y5mXZ8299L4nrUjhBMRy3pvz6YVGg0h9PSF1

BBSfDOB7PQI7o0G7nPgC+U/fP2PK9JJEs4uZ8Cfdt4
TcP4+5wBXa8OldWQ5vA3uWlFZwQiSCX8EayOqcOwoXgpB9uSsf2ro3nvsypbw9K8SPQIf4Zk+MH1OCjU

99uw9ZGngVpKAgk8s0zQMAy/yfzHZTdSmaZiI4270kQvag7cglMO1tBAgExJTr3spxZwolQ8Xg3DFoOA

D3Or+0yqTvf5uJM7X86ndThRuTBbo2Y/k3cTFJW4bL
jlamPW41xV9EAsbT5YkW1rokuKa8WL8ADVApUy+lUWdx6uJk8F9C/SAbFv0aypeW/4hqh5WAybIl/Delaney

PLDeEUnZ4fc1bN0iuG41s8YD2zTimg5MfKVxwZK0fJgs01p/Ynszz4G1eYN+ZdM5oS3WRs3GhMk+3VkH

xrRx2SwUsCWKGc6+tM0xG8kzjZuYrmcRZiWDIWd8a0
6L7EqHWYGTi82VyvJfGq7QLdut3BGrCLQKmz6fhOI/9CoaCMDGci64sFVMxxqO407ruvYvTyGM3XX7Xd

di+4GpYN+Q8BwF69g4bJPhz6W1ypbMeoNvoluzdrotaIrSKuC+oe68pZjJsBejSNs81JRyGHLpxWUvEZ

LnK9cUJl2OHNeymm3JXPOA8RK/aglgsfOLqvlCOtp7
j+IDUUFXAqJhH305kY5kzddEeWGtcV2kTAI5UvIhdAO4Cd+P5XpN7mzTIDJlz6Z06OeNnC7/CYyW7cJ6

ZKM8GHntHrcLwfwWYZIHR+I6hcEdEEg+x+7DgV26lm1e5NanugQdQArr7NPtN840X6t6uVjgNY8p5MVo

nxbkH2ZjPx56GRWhk2nhiSpSJDeMOWNBzBZFKtgRLD
rNY97kVcBh3NamT3tvl2Iu7HyaJdG8LN9n/grVVf9bCskjxTM8gz2OPSnothuFHldtDaQ36q2lmxc3xp

LOtOF/B1iZxs0VWAROaEK6BY3RoU9BACLAitmnB5hbK+dP4Z0agvEUu3Z8ObhBGu1BjRcVtpofUsSSxW

d6tuPF3BTO8ITQT4wHB0Y3brP8n6khzQRO5V/MPB6N
wctWioXecDuH1GzVvymdiinv0YPYHcpXks6x7MRfexb80siWKaUk6RcinhyzS4pu6qQxZe+GgK0flckV

OviXV50ZAIf9Dg/rktlOJq4P/XFAfvnPp7H6AtokQMzdOlQW7XSB0+pQt5c4Dm9hf0LYCAo660ttyOBV

zuspCY1qTobtuJS4iLSc0I/7BKDcPiE0huhLyhUcdf
AbDPieH+XafsdlfDdxB0ChxIdWyCNLX9Jx7nWcoZ7j5jrLiORGR4q/uGBIJWly8YdOfAzWQMLgBWauCY

F1C+V2Gk9vN5wmKpoLZS4BzjD5u52R1fLa/tRjTonOxG6czI5T492khahmc26fEHQJ66Wknh0cnCZ+Z2

4YMFM4yrm78BADLXZgPp4+bdAl1AmwPTAm/KeuC7EM
cS7rqDUjQ7fTr9Hk879bi4fIlB/2w11cafC4gwBgkYyVJvg6q1/rr68o4GhCPcEtrE+jaLJl/rEbBq/9

rRM6eTAPec1AAac0SaaHuF969HQljtxZLJSebPqLjDN76KRqgi+K+q85gWb0G8c5v2XRyCvNdg3PIq8r

4HZDcyBxh5ZlxdYNt2ebtnscedarHXQXk+P3OWPZc1
yqT4tVudy0rDYv4okwjEddOSY6k1TOnNWubXXgiCLO9WGpW4cDm8/vqJMyKJxxiBIExwSJK0uwbtDJvk

ZIhcDyGza3ggPIETZWqEKEkgWcsp+74l/8wuk7Pl1DRmZvXULhNFvIroRljSpYtfQjGAV2UaUsDwkb6b

H3cgNDWd1habed5/IvsYkxqq+5pEr/jGzRWf/tzPaC
/6M69O1u6it0UTKGVRHS5z7zH5+afs98FrCuWFXBUCHbmZbzyvS7tzSEEcml+vXQ5e4DcJcow8k

uCLcw7qnFVhXO3DUOSdWKbWDV/gUwLrZRcOGezwmaQjB9osL063JRaQajFVFeAhCB0Brm9OV5x/zz7Nl

j2XXzeZQHkZcQ2ETtdZu1zL4nC927mYSse1u7gnzlE
VLbOhOKS11CnixFzAgn1XoLbkEE2ssSXs7xRy4TsvWY6Qp/UdGc5Dpk0qkvveVhfr7sUAn7X45t8+woT

HxndWugWk/I0c29KGe4mEoD0VvlSwrf8vjRLhCYDcq8x+tNHqCYhN3k7CpeAnu6nTM7L79e3Vnz7pFzt

v2AOdr2FKbLTrQKqjmNbz0O4HlF/+KlySjNnbVexm7
J5TTbAtfGjM3MsqfgQMf0osG2LyOsl5U8G+QbfvPkTG+LrCq6jGIaIAFk0g58Dk9BstCWMMuVLzMUnRj

Wqz5nnrOLWtmRuKjo72oywbpuZ976PfFnBICaolrYJ1sbQpbI1an6AGJbrJRCBLXjg866eOBL3ff5W2C

Zw2/JCZuVHVQwTTb+sY05FoSpgLF/hcFFo2K243Yho
nAkOuJqUBBUYJO2EnTUtlldUnUyUQzkMjcX/Bg+NaT12lre1mM1ffbBovjqukCjbLIIckd1il9j9kNEA

o1/jWDebwH+CGqOx/4KOXuYSkuKAa6LWRIEuOgZZzVxNNpB8QT48SbQgTw6MYQi9wfQ4veHR3VXw6Aig

O6Xekpmj+qZd/oaCQyy2GZVn84Z5VG4PZWtouQEY4Y
BXRCnDxGTAh4fp7c3uaaBUENvfncdYcGHfhn3UyV9Yf+a0jn885aGMa0WgazbzsQ2yAar+bPQL0KvhiZ

dOf4RFsnHQCGkeR3iZjxNhkazjyhq9cNU1OVk+Wg2F2E+1my62JqwyT3rBElYiBF1evJ+DCGjGLLjAiX

hxZCMVZ/Jx38V0attFY0Ij4+jlRj+u8eeLWtPR+cKp
MHknfoWaIx3cy5rMaMWKb0kx4aTNb+5ExfZCxRcchjrduYbzj9V7UgM7On+djVNYgUL6vvVRvsi9n/0u

Xqmv9zpXsQcxKsCf5m0IFfVvhbhPfaPDsK+gy2JGTjv6S7P7m8giNAQnjVmdT6qmpkJEUe7TERl2872z

tHOPX5KBN9uiGWytylsfMJ/NLT1BOkZ9sWYV2HgEpE
pcb15Ajtc7sM+PlXl0ARsDA+9PqE/CFOhO9LQgubRGlZYNw93Mr5G7vLMuYrisY7kD+O2KKdge7lxvWn

3XdURdtQh1bePCni0T9BaKPrP6brTygjLy94AhkK7C5108HN2h65tuyBdYXlkm8zOyi1Xupo5JC/ta8w

bVnY/yHR5zDSu38NzNka90gqGCCxKrWSO26skeSdYW
uCy2Qp6ctyjszT8adljr2TQ1x5zcpIRk856jKeQ6njh9mvLKMDhrJ3Gc2r63kFwELQtj5UTTDO4nJYMd

8CdfOr8lV034tgq0PlOGigrS9pwNJfojPge8exRy4pw3d4cg046IQhICb9Q1dy7rz8vytMYYYg/7SKzw

H2hRMZuiM3GDX73xrJ4d8onIK8j+nQwA/W4B4FO8UK
gKzZ2MM4Ozqi/3+x3fb/l8n6rrFpOch7MG1SvGExrJen9AcwRaTXeznLaxry/WC/oojJm/9hu7P/H223

QqRKi9tXdPg43yCKCIqTsABprsqRVonrmSo0/qjeD03nC0KbkHsXDv4EWGurWtly4z/V92dtg/14Z6+S

SnTSL0o6/66J1RpZiHSPZ8xoW221uctpJhSjsbHYRd
Nqe0h55Ldr+hnUanhlgHkE+I0vy7DFwxE1F0GhShOHD05z5ioGD8UQxSskdW0vxL29orhe1/tQhtdvll

26K/EJxfXeuuUmXalenURsBOTHAqj184tACMh/l/s2oDt2I5oZTQULEad1Dse+MIApoaNm5SwAAQf9vr

O+/Lx15qQJoyJLBghosy1eJbXT5IqowfTop2c6Q6W+
eM8n7FINkaDtz2ieKdf3nGK5+rGKc5fYPEwhJnq86cWTYJEZbADeUeP0GNggu5+srXnf2vgLHUYiIZ0X

cM3GZ7sTcOstaRFsc1SCfQyHTbwW5GqAdp71Vt6r11WwGQPzAnh1nI5GOGsi2EAk/O2Dr0lIoeOG/X0Q

ESx/G6SjroUIGgl0yTCIudTedv8CCpVVW0jsdIpSEe
CRRBsg5HilNO1zTDu5H8WOvuFovsKX6Pi/suMj7W70YIBMw+BaTIGRQe/ox5xz4NDaiM2+OKbCyBqbus

Chi/6yJnZ3+qp6LPMOVosIY27cBEhV10xcD7RN4P9MPQsCnMIix95gOb12auL6dlG07sbmRPSVTJ2k+8e

OUcZuUz+to1wt8rBrNvAzzdEOdFgtPxSpP2vCwuNUv
pNVH9HCXASq8tANFo26kY4RK016oV1GJ8G9WSmsKzbuez/R0GslEBzhnfiiSNSfMeIK7p/ADIDPWcoEn

y9xW7is+zZpBFU7pXwxHWEg03f4TnWXXsCnfI3KWHIRyz3WjxML3Fv/GluHl/Pq4dbgFWHKTXdxXjaXD

UCXW53KGrA4xVrAz07Z5lsYjFKrNqrMnb+s23fHlD5
XWuj/gDB1kxMxIX8GfcIHXm1SXdfIyZ2GyBD1COGga3/V79vEjMh4EhPaRFh5CcqJSSVYBc4auBet6Ux

0lnkF7lwu7YUvMzXZBAVRWhIVZCNZ76tWa4dUOx5MKqfc2foGPIm5Xccr1Nnyq5zuvuIZrjFCvKDIjHX

SYIWY70MW/I87E2ociY7Jw8K86vGQ1Uzefc6inT6Ma
3Yn1UvUaLjyv4fpyLN84IA4R/+BGVuZ1UiTy4slYlv7UxY2M1EgsmUlXvSzBDV1BmbR7jQ8hK6NW0c3D

rgXPN2JWMT2g1N1Qv6bJDEAk+yc3VHXyr5VjpLZJwP65u14qAxNAnPTVj/inn6Es3eSLNQx2md093GNn

UTRMLw/8TCddo6C34tmrXtIDR0HqsFFZFLHGrB8L6c
lr6oYhnYIWJIdGGwNxjHC7xXyrXiNnnlEh82m7LNZdDt1LdeGyHz4OiHNerCDh58C4P2Q7InfRE+lrcW

5V01CIistNMmSHzHtLXUYMTYCimM8ed/OlbRhaEAahjeoYZiAXtSx3TMSAIVgisQ4cwA9pwvsflMIcLX

6zMdTex0K7nj6OaGhZzDKsBcAbkgI/K3W3i3bQMZgo
WN/BTxlBKbycC/LNbyd3dd8xsOVD4uJ5wk4Z7JKa3lxiX+Opr67+8XJgRu1UvMxnRAoGzXSnjwfYWmuU

45ohnnG7tJUAynz4l+5Ydy/J8TvW+Sx2qd/fEUn0HlaZhsXpyrfvt75ol2k0/JOGKrsK0yG5hjfE/05F

6H81GF9v+nCH9Rql01EU3GYBYChSen9xcH7vXwKZUO
1xjbIyE8Uh8HVcAon7CwQGcXkFkWFuBSVu5deXccX8zANU6ljKtq9EyG4nnceds5OR7E26nADZEpMQ0f

rKwVj796rsqOxKkZ3FmAzyBsxuKoh+2ft2ukU2akG21ao3W+3iZjmlKfC3nVopmVM1UNo84Jv2pJdZXS

h+J4Sj8ynVP1ECBCtzhSHNjPfD20uAy0MSfVaX3JeL
/8U8Ei6a3LwyeqZeN3nXSAcYyUygbn44FhI4m2F6MWS1+HqciB/Ny+bIU19yFM5un7ACZuxn7yfH4os7

/P82ZB925EOtbE7jghPwrcWcr+EUEft3xq8/rTe8t4tHVkmtFQUr8DfeHDmIJIa2jkbMxlPDpPdklcas

54duzf7MyKp9aS4O7mEZMFVgDUzMNLbPtwkAD9sw8s
1Mh5wIez5BsCLMt+hhoM10gLiWkGRrp2BJx9CutKQvyD/bK2s5oBUCV7nvRWTZoouQAZ6MzlSEOHsQ/a

duP4XptLz6yLZpbzoU6uJzk6qf00KDzVqY+fVW0EY/Wq9YEN5GUc0dMK9N46abFEhNL9atMl9N02QBpV

jPbnfFdtOlBAT9qGOb7FfVjEiSc8qSn5OTmN+7+r3x
cTVSe/eIi9CQD5UwJYxhoz/6oK6kp6OqKN3fhhd6VwXOsldR3khTE+gJug+Rue2f16KnTnxjW5dJeGB8

Ha8L8y90F4rRe3qktgSjD5KkHyVyl+6PAGupWoszeSHriRzvOQ72/AhG1Jz6tivzpNJY1xMDLrnGtLjd

vZs+eaaPWMj+iBD7GTAwX5o3x3jwVl4Yqoug3uSLYq
KgCQx8/5iMyn3G/Ye6+/i1lqTKqFq/5LntpovMMAeRYv+Vpg/p13rt3l0cDjI4c4JiqxR2aMl6wazMj/

79GJYzrIvZOcM0OOCeOc0UwFjqPFmEjJq2gm1YWHvunEyHRQbA/2R1bmb64Z48yLQ+PawYxeLHOb+cSe

uAnJp78vX13gCSEJ/OLFh28yTy3w/jUqM8FBsxHSmv
Mzxd0ukUAUZJnPd3mhzuFhT3Z/CA7Ia9OjU5BmfIStdYjGzXHDtbfNwHnuGCZiLhTKO30Wsz+HErHjxt

/opGp2etXSd6Ck5W0kgBVMQ9hoZOX1hShNJFX6owuxsAIFlkOaZA9fbJls4pqNQV+t0Xyv7wnxKldxZM

sJW5G2KM+iOMWkCNX5+ybT5SBSfOec0SXwYSE0ie7p
WYrViYsiDG2eNfOnWjETBlJZqwQ5nyno6x3iq8H2rYFHBnzDuJcRB0nYo0i4k9qOXI1UMjNSpvDTNAiG

Tg1L48/wtClwXQS7H8BfgGvSHZP9omAZTIgNHDuaRmm/PGdAa+J1eHA2DU6vhGxr6cBPFS+tUCi5rDJ0

HOx2m16AfmzdkzRut7yvlwLtH0XVtvF0GYunOohQrb
n5LjqTnwqgra+swlHutIOKaiHzb5f0ehtWW/mt0UEL4/fF+h/UPT1MSKfTlJ5H8V7Wb/tnq9uQ5q8siw

sZzd15cIUR49Gkp4rh19JWDgu3KL0dc2KDbKsa4uEqMm0KOhk46O4022j2t7tBL5xC5ApmjzcAwJ6caz

1tknjVyd0Z5ULnr9O6uqkM5lmqNwjxxs3KlkLqOdEd
ZaCLAqNX3UZWl03GbPutc/HsqrXYz3ChJW7sWCU41v9fZ24832IcIer8LUZ9VSAmStJaSJkctlorH6Pq

vTaRYASVil23Nucfy75PZhZf6QCbtf++jh+P2sndbrzRQgY6ocxwKyp40ZpsFQZ0jjzy6HEtpm18Z+JS

AGtYoBpsKBSnCGOXD5DBHa48Au+usZoiqZG/vfWPvP
oKa+L/ufcbS2Yj8JPhB1ePbr6vDQrEpCTnOnzHuQluCFGGa5C2iMCLQhSrGjrMPSZB2SNIBO0+9v7rnn

/melbah0akxlm2m1+8xe17o+Z8/aCwHFGCdKCPm8+e7ZXGgg2G7yE2ljozAb4+GExLRpnaSATZJpJQ3Y

LTbMVGc4og7wQnMa3D79sjp1B98zj063Y5M7eAdbyC
9s6XLT0hpKjsL6lr/mc0WdFDg75Ly34HGPimbVg5KxszadcKf9I463aJQNTj66b1PKKirJElwDB9jftG

n1dVXc1l8wyJ0xxdrYJ1t118+F+NB6CH9OU+NfLokhYdj3zbc0yh/+4UMUqqjSvInSvUoNSvuQ64tdS8

wj4ja9nWgPZoAvMM9/HbTlagmiaPHbVLGvqI+r2FM/
DXlYWCWakMf1NMtldoHmhsx4mhHRy2eYv5DVCcTji5LqoFCDo6jnI50NgizseyqSSwql9pEE1VKbBCwQ

3Dx0Z6mjrKtqcHCgh1q/UpaAZFyuBQ6Q0xUhhJOHhs6QjdPODzBV2Z5Zk/kG+DwEDcNL9siFrR5m8C3t

45eK4nbE+frpqIlmtLpeiEQ47O8UD/fdtRRivMMYo/
aDdXaKX96JTbzjH9T05ih4O0n6TjyjM+XShpxBS/0HG/5MEd3I7HnjoH3kmw13Wx/qWu9eCrYWdOTd9P

/g1zqwlra9HqUwyiRBtuoIjltffqnPbzRw+zOkcwpP5NZSWRhnDXOfSMjtqsIoGzTEWOqMSV56uL77dR

qrQ8f0lBmGgGKPP2zAHOTRP9wL8h9ax/cA7vrhVUrB
ol7tEPC76Niez0SnGnQb4OMmJFcxcIbBgvoG+TGuMiUjvf1ACCIiwhzAPEdPIvRszRjd7w1i+Xe18dRY

K8/DS0oWsK9pVXZoaxQHPUvd4jWfJM9kPTh3nuadD3bfLlnrnvMKt91Fyu68rBOSbAZyiV/MjyWDz90x

oRqvXMqvMHgEpc9R027Q/twdCQ+WN3gty/kyXc8Ota
4KreGwXHtS1nVb+dXAE1rPxPTjEM48HTLFGeIF6yZAabnTZHeUp/zGot0BVWILUBw/pgyI6kwI6yefWU

bw537XE8DzANG+TJspUn7+/slCAjjWtwb5ufr/7Z3lmg/7hM9cHzrxbzINywg+21wZ/0JGkh2b5atltT

xW0VmYs4f2i1PNCprTW5EfKEobscKUdanHnt++mVp2
cmXb8SsQLp2cmlzmzMBj6HmGVtp1BbsJ8MjD588oX/dAHAgWM6LEf0trjQqhwQfp3/V7hdSl2NZfkpUL

TpjbHQh48kFywR9wYJKFyxvEgV0+Wfor8qICBYrWXCp3cErO5Xv9z06uu+6rqV4MandSIA4y4TWjgtTJ

P95m8XyyvXSTqINMeZpQIgeaxtRKK0iDsLnBKKEdAj
Gju/KbehXBf9noZ1dEB5MGPsnER3gjl5uN6w7ZH9aO4n2d1DOrxDCZYKN7B/mew3/lUxTxlVoaKTGHcP

e9nrYP/FpkU2rNj3L9jgg2PIVIAbEN+lRUczN6f7PCkNZbyi4u0LBGo5sYgnW14BFeMqP0/KDzY6NygO

aoAk5XLsdQTNGLpW/RvpZFFpUo4z46it6T0qseF50s
GVjWqzFlg2KkNJjT8W/VVumssAdWBd3NXxcwx7QyirXllWUf+jWsaEv6+g6QM+tJ8QDVbfS1Un0uEQEr

BoczB6qc8TepYl9lkM7YS+Fcl+Wm4RxRit8O34O2+Sq71b7lthay4xtDgsM2YWFaiFBgs1IeGYUjVCGO

2Iegd5ztyS5zX69YDjxoz3T3SWJbrk3sfvzg7Vil4u
Z534sIdUojHYgRzajW6dQ2mfXZv6qsSN3cbJr13zBf1EgFhcc+3tyQt/sWbxkB3hIBILahfP9Y++T7zz

4t/T/2CV0+ot97sez+5T/p0D2Hxv9YMyzuN2u3zkWckAXFGqbkC9dejjNRAvDFmFWrjX86IHdvT/afPE

QqoNss9dmaz91IkGML6hfbHDFM1S7624OXrvJzcIk4
mknAle3YHGKtrgjrlmI4rmqzgJCHSYsIYa9m7s9yg6mSoHsAQ2s+p3jmAHGwH+FfU0JepjEs5Ud2ACWT

qM0NLN4vtS37IkKscr9dbR3MnoPYJrlf4+V5Jh22t71m0LAl/b/q0lMd9PAEfYEWBICxi+vuSDuxDLjJ

GrYtjiplFNklJ4AGfdqo/dnpm4Fps5atCTwfurbPFr
CdjZMKzOECPR6wCNJPtcVGF5OWMVbppu0wKvfnqqoiufil6q8zH31Mt1kXt+CM2b/5jnMiATk6oFCXgO

4/f5FHSR6fI5LgYPZIJcxmL1aaH9//yUYuWynFj+eno8YAqOs25jrx7uu/9SzGLTuZF/mDOfw8Yv1QtA

srnR22c0aJthV4Dfkwlf7H0S+1gW+YWzDKWQFrRT9e
sa6D1+aqPLwaMwnWlGnxq10dJKOgWJ5iV3sY5wunOs94nXWu78PqWZEYddJ4CE9Yw35S31/NnUGiMiuh

XVgQCGZ3SI0V4KDEGMw2PmoGkRbUwGKRqT5XGU2nA/uitoDb9QP2YImeIRyvgyf4zvcylu7+WKeXT4fd

PJ+RPIQdu6hP19Fsy3UyKaYI1S4INxibce6zCRHde3
I0mS5yT/vuhV4A6kZTiil5bhWoWqpJhFI8vKJTYY7XkjDOwNqUC1hRXbWek6h1hxHS7kdSpNIGi0Db+T

hzIFGONQ0aWtikyupe0Locdmu007WY8kdFgAGiRMOlQSli4dmo5aTrEy0Iq2n5II+Vr77ScuQV7R762k

euwDsmaZX1h+ZV782IHiXWrdjH1SJfZJF4nSdbPweL
NXY/6XHi48coi6wV73ol7bMe00pbxyY7IHY7fJ1iQ7YzTqlC0itFt9hoPC3AihhnmOXJ4XSK8GeWoER3

jaxBl9RjfCbWuC37szOhMJWrpimwee3LeMTzZH8EtRmXTH79EuWPNHoapmI/5w3RkFNHDSlT6tfMGD6a

AXc0vNZNGH0Y14asI1f0Hyc2NXlwOrr9v8QSHLJIzV
WnIgWkc9lKjBbowpQLnQALLc69xnzhR4RgGb7KpXKvl+e/P1WcGZ1zrftY1JvQQbM1Ex4C/VId4441oy

mYN8iBassBz3JYeyKyNC6dK7IqYwEqd+CChFqrqS17igvFtoyWIDLl26kVIs0tj1my4vVopn/2jgbCL1

3UxIRQAJI9Oha10Gl8gC5NFGgEMdOBro97AJMtUGLs
ktmCKwsFcu2hUvuYAt33wMxlLWcF1Fr22jMtfI1UvOWKQZorbPepnMKCCP2UVCMiqY1gUp8+oCQLvDjq

fyOzRuimkvcmNRBBQZHuzUpLE8SHiaprh9CRRbznj9L5elzXG4sjhAI37KPP6nF0qJxzFIHzNdGkkYpW

MrutBEwc98sUkOa3idaKiN6CLTEkKUO/LsIDZvEnmd
iWu/p7uHYrHTW85Fp51ZGWWYp6eAudibGx9C4oyr0qVlGUb+3VT4eDh9KInfboCF2v5Mr6cjDWXdMm2i

Zuk7bi+oczC3g8O0hzdFJ3SHt8BELwkCHyxK589Q8FZZc6RNLOai0df85ipUwsF0/Hxa9pJTGs4HI6+w

auPGfUw3Cj8eGf8MlYPtfKPm75ocaJr4pV4M6U3zB+
3Sk1VspPGdoKf/ryMX0RwRNXQlW4VZb1oL545KgDCi/yJ7xG/v9VvjgT9Q9HHuHPtcZJfsunEuAqzIU1

Rm8W23Vbh7KH/A8+Z289eWZGqzeopSaw63DldrUjoWnEdJMN1vr6liZGi4X2MWfPDG+8+xObNtwgK//5

rD5JXU4q/lWeOye1eS5u7FUoei0e/CJVYAIPDCZkMo
gsPL+38j/p4vz/fU5pfKTSoi/c9ACupNyXLeXfipzrwoOXa/ZtERubh/RPlq2lE1+UK96BZY17zpFqDW

MOcziOwGCfYMmZAvVNu++sCrzMSN1Ql1qhktP6aJa6k97h5BAsH1I43pyPxgLQtwOkdI2nYFW9S/fMpJ

XfIpIMmPrDsuocjm1pB/X3FWpb2gzToDzxJz6M3pBW
V/jSVXysPYbtX4uu5f2sV++/n26FmhYP4xmaKwh3K0TqzxCm+VFduD31mNzCr4rp/Qm/sXdaqqenBntL

0nUay6GQ3PlMyf78so8OJJwpi/oYuIw/iiDOUP9fiYuIRQhKlnrckcNxr0KPEhCggwVdZgka0je26B8t

QPlymy9k/METoW6dvC+0vsdvb3tCz5FdPBn907pR4v
tCEpDtQEm425QQE0mVtjrqKnY4yf0n/T5KWHCFJ15pbHatnyBCoblTB+zlpl4X+aV6IXMzRWoBvANsdL

VTtlMAH7PjGi7cOL9FMqgivaZnm0CqdSblmaTpmll4M29mjEE8V5uS+KwGbZytt6PAGEQWEOq1vcLR7N

rQJlC4s0VB6JfRKp02cpisVBN6zo8TR01yYzCbRtwC
Iahk/1q33N+1LpVDJPzIEa3Bgm6sugFfZp9UHoJfH97c4Rm1Xi4Z+1DrjApO9gll6pwS2ErG6r0XUN98

efnq9+IFhgZt8SFZ/SOke3cFNLAA5wI7aw0Cr/BBuCt+w0oAtySEEwIBf6cC/i+anIXGS3e/AlRyuRXd

o7sHAKrA0/xADCLUnF6cC/PdYU1rX2pPU3WNOjHHNu
d+qmImvbZY6SRI27wSKcwWzwxzbk1dt7UzTqc3zO8iFNiEVOwESozZldFYHYWY4M0+O3bv56qjerjv81

8k3mV+2W8aU2i8IP92HjY45+d6cKfrwbpY3xA6VAZkhGgwDv/NhH5a7sGJltdUAF7kqmasxw0PpcsGe4

1MamQneXUiWp/aKFXSNx7zlHljDzhgQI9PM3E6pkKk
16X/F6c6veaWx9ggcQH6AdMLbpCXeK44G+gAL6cKPs/5kY8xWdHvWkYK7wdzYgzMDuhrg8/fyRO36KDH

zW/m03nndXV85Gju811ioWDHxQOwn8f6G4h23nheAMEYoD8AtJlin7U+DctL6ZlJ0Na/bILvSPy5RHan

UhJRXWuxcO2B2zYqv0rL2Ml4n1IjACozZXuZc75jA9
MOpNZEw5oxrDwnq8JIV/KEhpR9LxjmExNChLqLGSHZ9R1stkc64KqvmBBnyc+5nPv+LdF0VMesVSpOrc

x8lO5s8q45XM2O25KTSA57DeQ97Ic80OzsU+fMHizkmX/cM6/bs/ppgYRIUtXqojTAiDywk0OJppjmo0

v3Cx/mkhO+7YHCAlGnPA/SDND19669cmOMK0FUH6CG
FhOhvFKmlv9EBzK8oeGLG3QFZtOBVGSqlGWyO0a8Qq9KXUpOdotUmxKP6PJ6DTAUAUHfKkiqB/lajOvB

BehLFMuT7tKAN5F0Lw6NGtpF+n77VPUB2Db15xxFT2Hl20zcVSAEFFvKP5V2vD6hPefxibqlFWi+z5tz

6fqFmcH+Or1AwOXBruKPOigm7CMeGq+y2M3v2IKDla
xT0/VsJfJToqzEepcG4OlsPcOl8vlTDTcw2h7nvImC7k+pRnCfN47HbLS3IX95AVyhmdstKZtdOu/kPh

EuWVBfg+nM81GHA68LnSbengugouFxLu15FM4mOtO+jYikB59oi4rob6tt7ZWfxn6y6N3CZ+W+UVB9Ac

XDZ61X4Vw1sRpSevd5H+vSkoSvcpLtsWeb7gOXaEZn
msUuP0dV82WIQcA2SioSyFLIIHL/hTTIz97zLxBFNCez6E57Ywd/El5BWh2mt1/TTCz+KnX4e8+qZ7F4

vZ8Gm1rTF/XcFcniuFnm8TEZxRyB+23gfWAKCMk9m6m/98dgZIeS7U5dtzL1RatHL+MV867p/B6BSKxL

+eRxvuhI4Rxk/h2IUoOfxd9d7dcTfUd5EVsqLnFnkt
1L/5pomH5uOpxg4C0zav9UihMPM1CdI7J5HW43HxghCBs0N+MP8Vl2RYTwShthUQt3FPp4nkO7/ZgjIG

9HOK0QtiEOTMkJrEzwcTgMXsltn7W/NQ1Mbv6m0JP1750krO9nk2zX+JvEG6/HdDK2FDV8O1YaPYhjD+

SC9UIlkacLr4Cr3I07M0VGHWToSr+9p6XierYMIEp6
gii39SggkRjiowEAs9UENhzpdEDeiBYU3WbTtZkNmYec/SfwPqLokKZTlYLuiJAWsSPJ5lFClWZeiQyM

MHgHrXBsMayjcCCs834BR1UqT6faLkw7jaF4FTWl9VPqr64DE/GEGq6HYIQ0BK+4vhH2dpNosBwb9JnG

w/jjDje6S3ePHpPE8aqghUN/8vXZSbjVGzI59GZMi0
iEfC3u429gYseBtA/KAKI73Y75Efs4/igSBoLLZyUDWkDdFAinxRbzG4sn4gvIxFdkoxDkk55WrIzwGi

nsXvFNWBfoqyb5nyjEDoH6VUEi5tVet0Mh6kYnJdII3X41zh2OLt9DCvzwzjNYVPXagUEEMdrXMh/l5V

B5F/+zUAZf9SKJ/jv6zqFqb4HHRN0lrgWHl98QVyu8
izHF1UwqghA9XfHVIGo3ukEHR9hn25pUNAeb+fUSYmSaaoIydxGeWIiVbH8WE4ahRIG2M3HCS7xXATlE

EpBdgAc20ap06Tm9/ZaMyUXdcumiUDAhCT6WOv2OjmDWZ09/tDanWws+XMiFN12EXxi9G5U8hdwOzfNU

uc/zrPvRtLYSUKUJQSHuZStTBmjxA3rUeoDj97oo+G
WQ2npzY0HVjvEhjAN55AVSy0d4AB+cmR7gvS2hYPeWbl+uwq1FC0wu+qjbZtJNt/EPPLql+tghITYA7x

8/1UVm/3KNnynOtjRCn+9rVYqzUheBKSZtV9d4zrZ+a804fPccQhgEfB81eMc5hn0SwR39rh2jMubVJO

GQAOCIqChvptI+UpF60dvNrYoLtb1Va44CSwBJLjfV
b+J6hv0LiwxYLb0uTbDWUz+GXUvgkkfiyrGrlm+FdEakOzVgvniJBN29iXAtInhNFnlQm6TM3orgyEQR

xJ5+yNHnwCv1vdRcbN9igjNv4q5a9UvrjeRZa4vS+qB85qmJCUyjYWuGYICSO3UlDJLxaooHM3udgBHf

N5/9ycUGmvpnB56W4T8kehRtgfhf6qpbZQtxqruaVn
fSno4Wh76CEsyPHE4kHobKFZm64TOcgz6bNt2qaa3DHFMs8kviz2aOmiWkIUI4u+nGutW3hIOMPRJp+7

Ihq37l/KhYocNIuJBVhraH9fI+CMjq+ugmWiW+ZE2pHSwA2spJreygic3HX1jMxItnxPW2Z3sx/RimMQ

gwAAYMYv0ByyEybk8rS1BJRgoqnmfJT3xwpp1ea6Mr
lU7zvv/wnE7oFq4R5cfdc35pBezDpN8P0vipWX3shO3zoR43n8fz2vPglPYKyr7jg1zPOsZVcnIOfXMb

UVgoW7RviyKL4znoUaC5C8nxo+4iLQnkm6ESQ+4vmuGCY0yRGb5gIVV/2Y/pFMaJ6Amf4Hq385m8OdZS

4MJ6vE7b9jkk0Q47Sl22Adbv5rOFqdmf8PfEYDP2IN
XW/bZ8M5Cri1LtHvdn2Rpqi5UQXkAmbxnGrp98d8cY7+wd370jmaFUHclxxTvUbGw0raqVSmszw8MVjd

argLsw3PdoXpbSRrn79x/hJAKOKqS1kxn4s5o4lbp7KU/+ErL620An3QE6Br90Sa/V+Q8/lq5KeZ0HLS

brRbKT+98LXYiu1FEthU6FZA4cbfyuOxvHtuKdqkOr
h5M4hBUUI2OZKZKXClMkXTVJjfieEkAzm7gL36xjcKuaSdTIBTlfgkr3NuPfeF8HZraQyOqyf33B4QDp

jpjHbOpixK7JJsNSYageVZPdjwK6Mu1CLcrzWHcz5otfL4qgLxv/ud1/XlHc4icb5Ci3+tH0hCDAFKYn

e4B0svFK9yFrPlp5jd0o2E2Q89o9o88H7z1WoEMO6Y
uwSANirBPKoQZuJ7xLbOZ1fLsgSWjxd3S/LmSa/2Vu1gTh9DjdEdct9sPlPTVE2uIG/XwB9vzhHqUTc+

gUF2tPfiIAEFGu7ahVnXFUk5qfXjieTlVjLg46f1HumJD/psH5RVZDhg24i0HqVAVHfmzes6Yzy8yatM

Z5nboBqJHCgew485RRtn8No+OqT4zc4KAxl6Rt6tLp
BMJ380cN69GmcQoMEfCKNGFRgpQ4yOmE/g5tDnp4odwIl8TXuiKPPq/5l3KTc1sV1+cLBC9/WVSZosCG

jU91c4kw1twzhL0i7r4zzUpU62/dIipP6wrAruump0M120Cp5589ZZcOd+bewSduy4J0n6SvTpI2pTAl

rMMs3lcpDxWVkmpEubimQAdq7qKMYeGBB0qwdfT7xL
hBd3EODYGomJjXEXK5xlcWyzl079izDpzwhZELn338ObVhbIJWaKzsuIBFPoAWZ4bJ4jsmKU8wnOcViv

lr1TlhbCbTRNhSXBqzayv/+9jytSrer+pfApKKx3qsCvc87K14Z7PBAOX0RdsryRPQM9vI6NL1urwUO9

HITnkx9+RgHcsDtbukxude6kuajVm6PFpOoEXqYmti
spOz1luC7YzNq+GfurMPMLiecfnCXY6br0bOnGnB0p24EaS3+AqR1UTtHLarcuM6WHVIGDoCqirgZiZz

Tm4YSL39mw82nbWWce0IFd61vTDp+zb2MKsSVaE54Zg7szqjU5O5aC3g/iQAaLdGX0g35kOFX9uNzsvv

hJOGtZqy1KqphUZGUk6Lnp10lvT/tGHgct39f/TnxL
005woSYEjExi8RQF9SME87XR73nzFYnJ1thhqkbT1BEr+7niTqu7AXe3FOyc2qx4WoaSKirJtkeKY5UA

33up1dJfrZ2GC8cgmPUJU9RpVXYXXLy0U6kv9aGvqiutXCQQUuAEZTsPJvPyKJxUlcndTy3SuAF0B63d

Ln+sbgfiRtlpSnWVmmyTxxCtl8lwMG5jhJv6vQ5Q+u
6+aMfhN/XqBaNMwQSUTD5h2+BmTc3KYYYKg7RtDiKEjtAHROkqQHvZWPjQ6Orz6VcgZWV0ueblKc4Yu9

CrBAHio07PokX8TVRgX5VWQQ/bASZDdVUwIO1tx1LoT/vVH+f/2OsIJJjGOfiYe5yqFQ+V+0/L/vMf6/

KiPatPG0gwyr4ZJzoZa2BWJsYmUjRQM2K9wxJq/NR7
edIAt6GtDYxZ5ZqHhg5AeRG0GliJfClWQTGXo1AgAEnsI/ojRTBefefjasV4XfrJg1WjKQkaq/5cJ7J9

pESykDdSAUG4kblVWwiHVd66y/xLeVMihK/H9Cklkoy2+UetkNtqf653Ng5JGx2MbdakRLf6/r5kTKF0

VnE6apf2Vmcyrl5rQzRP2coMCr9LaAZvTqUi2BTbRn
7XJtQpeurREAr7LXtVhL9X6+KqId8QMUxyk0tagZ3b7lgSPZrPCiuOaubV7dcFKEm8inzc6vsuY6Orhh

Chpkn1sDl5x0wul8yXBRmTVQ5m/cfBKcsheV2kfoSFzzKe1JiB8zPS/mHf/wTvx5v9hoO+aG/htKfTjv

/FH9hSX/CUbwe7yUQZ3D6lCZVlx+UIg/jZMOFMIJBQ
TMp8nhF7/8HTiVv1jteqafYVien5L0JrLJLig1vFmNTQytmvIEi0iHgwQUAlXJ8p1SJtExTE3K4iG5ef

vVX8sMfJcuR1noq05PB4/qkRHyB+s/FuVBmrH1jRXmcu51pXwXZ1R45LwZbLcS5YJcDbKfIiCGiWRVw7

pqHdfOw7fMOeX6JKYPY1JvnBDYJ5YqYioTgXwr9T6r
Xc23Dxa7TdcxxszuKpB6d/2wSw8hIPhyF+JtPgvbmc1/CP8U+kAo2Q/QNE8pfAgKSMDqvywTvOlZIgOj

MYJwojp15FmyFlOxit6i5X82XAJA04Szg3Pb2mPVXZFWsjjkimXJ6hoexVWtT9ts7TIEGvVaJb94/gzM

9V7aQiGUcH7WX5p+TlERmpWTTqoncGH5KhGkO5+axD
wIi2cQodUf3cm5zw/itDZOSLUq+etHEUT4L0Q6DuQ62nubOnQ+RI2THIBUbrq9qceOzHKd6+yrkKtWLm

vzeonShtZKaIfgytQcVKirvkJ36+lY56l2IKShy8sIXk5YQ2v+4BIl95RyqB0hJAmaG92uGA/zy+32m/

uzpt+w+xFRw5axuI8TS1hVcfi+As/Q9KGR9n8xU/6F
1Wo29D1SR+NlMwMgumZ3nrEcdxxBBe07/8hnvLhzPhVjFSnO7k+VHnC8mKit5eAKEvtlDDr8F0VORUzE

PfCWSrjwMFaYF0o+HYN30C5lL83U2/gBdaLBVTNZVz7HAbLGVJispb+1bAaBG9EAqCuoFrsiFH/i2fNh

ubWwABZfOo3PGdF/GJBiIyY/Pe77oFu5wUH79zcHxS
wLcsCcuyFdd9UFY6+S1gfsgPnOoDTEtaNg+3kjGz2o6CrUn0gz/usVa5KL7DhkQzDC8q1YAotPbm8AjT

K+XQnz6In92OY4ytR+L3biGILeElF+VZz7qMXp6Gj0ZUPraf4u3WSDILTxWp+DhPC+pXvx1xBkPKgrmx

MbxzvzXYirRgHCdNayEq3hy8EZ81RSs7+CafxWxoGJ
NTlpST4jP0q1or5xMCxxzW2LtetpE/Lyo5yhS4D+A7d6leINW9om0MUg/lPEEwaeil5kaiCv6QF0F+K2

K4EqWBhQCoFVVTAJNCehg7609jtFhaKAnHoiP1DjSVVz4R2Gutue5kIOQN6M90nnt+4ngu3eKe3ABxeR

lKfSe4oOc1hv8eCasPxuwJPpZrbY2fPyh1cBHgll4x
5cEmP74Ss/4CdWPX5L3ObAwH9vs4MdSvDo7H6gpG0+NMAq/FoyD8AJMohueC3eIOnn+UjkYHxYmBD6te

+WMn4Pg1xNFyAcZD3IiDOg1ZLx51nSRrenxHmLSN7d6qilaAM6jsQjD/A+8hEpxiLYALX6L1EbTzG9EU

aLmmFMnKPYaL0xNQXjg+34gjDLX1TxIzA6CsQthhYk
L81eZYB9IbN9p6/zk6D8PO31WHmbhYnpf8+zLPjtYKKiFpO44HmXVg0nb8D1+F1PL2ygwagmgBu33WU7

8UQ18S2k2pRnCDCi78yysMBSHvtg7Ipgra618eHqYzmTG2izW9QyItAdwFhC3EwGF0w9B6TgygQGuAHM

0yBBcjrSqsDrmyr2a7XTasDHul3mnS4I+y+tiN1BFe
OX9MjpKX06Vx7NGnM3iZZdfE7esJSGUFrmmEP4ME411Q382beMs0I7LwM73rMMReq+5PQ3uD/CviAzFl

P2t6+RXvD5iOWIKmi19vZSF0q5m9QprSQExc9ZIqYysZl1ha/0f2Jb1b70YNBc6PB+HdQDTn7A08ZimY

DKaB8Sd54VqbbeBaAV3LFV1yTEwfvulB3Elt6Gx6rP
irMhdm68v/z8CKutoY0gA0IrL+/ceJsd8frkqphuc+ZHBtyktG+0IyyA1t7ikw2EQPvsGNwa6MB9x8Ey

utKEaEYmFgZ6tmA+Y0Af3gdZAArtqNziQNp1ZuSCBnq1YRjkTTgqNJsKsPPnlZg13c17/pNIN4XEM28o

gse89VKskiHgCByOw7kLaD3mIdhHy1NsusC+HmSNCk
OatlZcH11l7eAVz/vfiTkuByfMx05uXAHpYxrJQ45ZLpg1CyT7esl4rpeuMWooT/46L4f6Y0Ll5lZRlH

7zO3d7X+gY57ajyrbbL93+n3OFTW8CJ9gDAVEeq5BI6MDNAcUeSQKIp0g8CvcpvM753juwH59TysBeQs

wgjOrFtJDSB3OfssLNvI1zH2PH0WEp+n0gyXaRYZMA
R/dFljX0qDoXjHXu7tqRTFqW8hVBPMJEihnS9+8JCz+AhAFV2551OuyaOObpv5K8NbdFewBsWURh3sXH

rZMtE8MgHoxcoS7jWyz2DIQ/bjZjPuGyo/tc62iICGfRBACvG3vS5MbDXUzql4h07k5d5dfYwWCF4TA6

8CcVzYas9dFFknfN5b4FF/AAkR8mkBxCaCwHASkyyg
n+Lt8nmlK7/n5VHTVNaPaJRP/iA1uAKOkC11w3lRAsKXyyk1bKlkoHYebRuiAhz5shvSUKHxhwE9Z0l7

gSQYfXLvVYITnjmHcXhIpyX7b9moug5X/aH/RJND3ifX6JaXq9xrGPq94N+wAKWOfbq6D4OWI7/ipW7y

2gY9e1OpSXaPFJIB5rx+G9VvBC9tHIT1DvYHOevLg1
rPJ3coYVLMIfWMeL2uQwKkHT/lGAtuTlq3bh9J6yfKfaObSpBXgbp4WN2w1hezYTF18OsXlNOcwTDydV

xHUYO95Sl2lHcUBCp5ZPsHz8sNbE4eS1QBbmkUZ1bAh7fkZLOpuRscWTaFjhqp/9QUMTUv4wcRK0Uk+X

KMhC0TletYFM3Qz7ZtgQZcn6zIOw4ixMHqtth9NRDm
FIAuOhl0SegjqKCG3q5QqGTZaNM9adzyDH313DarmFPVg7FV3FOzZ85ZgzPBY22bYX6nqBwocW+ttS0u

erkoRfLKUd81zytJtZMTbTLL8WaLnZPWiSMkqqD+xoagO7lO44UoCsZqUovy5en8I2/2VLnto937MYAz

oKYPr2Jg8yZCjcX5avX0Em1c+yPYEbD92nN0srCd3A
8kcbjxlhZSAVPUZHPe315hOIm/zr4eGHaYxmfsZuAZlGoyC+O6CRm55AbFT9ei0UQi+xKp5RjFyM1/ro

NlfS71oz8KWW52nVC/SV0q6sab6+aNz1UdakTfn1HdpnemEqMwCUbkHlXvt0FV9dLQ/JfHcFeIdPuzPQ

f3pkW7yG69ddi9l5poswSIq8OtE9K+RV/NwjFEy1hd
TTPgIB8EHVQO6cs8ZAx7rCShptMVl8L+GiYRX0lMTt7aZBUr4c9LzU+Zm0+bZ3lZVp9h+ni7ZtZ8BnG0

2iAKq8B39vxz3UozW1uGcWyQDvuuSL4fktMhgGrAU/k4H8vlfLIHtqVPVcwUK4EUIdlY/9/O42wrZbxm

CprT+ha8YKubDZlSMqCLPiq/vLNXt+0hpXlFm7WNL6
0TBqOj1jyaeGW6ZlUamW+zpVQju88OwS9ooCdK1j2IRnohWqQCvyEW54Lrajx4dpZlJ3eK1Ws+cBZIU4

mxl1tMiOoYRljXlQUXrJpQuxw/SwyXTmm7LyF3G+d3Pj8M/IX6aUGR8JTCn93ezDHtyyCmI7mY68vXSE

EcgPHLzudWrWeRwiaENi8CEqNz1frWsFZhhd+aZKgG
87vEMf370ZuobLhoK1r+u6HMbo+vqImeqw74A1BQ2lkD0clifoM/edbYksOMvyXlWx/itKGSyM+J8RiL

wDiD+tRQKL8M06llr/AG27bODa7tp7TL6BGBPFESsUXpzcBve7lvPuFehWbNMNr2u4T2hRN4wE/83R1R

E0FOThiaNqtYB0VbMCnAPFpSdEYZUDHHW3uSXhUfHx
nxUlhSvGfTWfDN+bRfH0ds0M7SBll25k0t0OisO7k6Qcqytvt7Ji17ucXRSQpwU2wD5QUEGb0rVqXx04

bOFKKl5zJaokUhw3Cji44ys+Wns831ZYBQiNxYByt1dDTePlNgZ9yz6i4ye3fk8p9g8tMEFZfIQyJ8iV

+R82UCu0EybaQO5Vh/jNu9LOI7XbMT5kha6Ar1iRTN
cXCBcuAoKyNY1iVzXeXIzgPG7WjVQ6qcsBemF4rkL9iZlNvtIAM5SYiurdFLIE9YmKjmsLnHkVejeYci

EC8+AaPUyUIE+6yWUc8nYNCq1lnqsfw7k7frkF9HKbPHdCDhYHbfq9avIxH/q29XQ/wMEHoOBPBhCwHu

ymmwkx/YDRC5z3ekj/nCXNx/7aXx5VpcnfLZPxkUto
Lkyx63hydlmCvo/ekPkek0n5U12oTiG85+lgZCTtCRk4HzBV/gkt2fqJEoK4c71SU6pDYXQQUMKzNAm9

vMExQ1hNdOPUn0qRxgJZHDplO/YrgsM8iZapl1zwYdXd0PNhiZjiGT70eSF7FX/ME5WJBOqzl2O55ghs

rQjXC0CGhPAB8RtfCaiw+jfAHgXHowXzLe9UipDGDS
fEPkNxjwrdcKgtNX5eXtVfh4LTvnuiY/2bDo0oP0g5jBJMWRioyg+Wp2z2MLWBBK4+FtAT6alsHL31XZ

jVK3FJUuIf/JTwamxhNNzW7rIgKElZ3lN+ZDe5N4kaHIuCnyxLaKfYeLF9HYWYviYO130cHfpgMDgBm4

Y687TQOE/5+5/6ucRlLu7Yz5Hg5xOrj1c2f8s2sgjd
ynejFZILi/pIEZF688+BlZp++rCIhHnqyf/drsPGqXegrw7/BJdZLQ/4QADmm54U6VpUx5+vEMTIEeMM

7SnyrcUnz2GW8s1YZ2NZsbrj0cO0YwUffDm1Tbiak9DulBDyFVQdQDc95OgYgxSJ9WCUudixSbK3rL0Z

J4WmTirjGTpaRmDLjMM23IHuW5Fsi52+rgm7DJ159X
98cW38E0M6krDPvuUTq+yMFgNz4uYt2150XA5ZD1p82yoEapF/Qs2rOyxyJ9M9tn6EzqlY2lkTI1bnAb

mmhe6GrYGxhW4Wnw9Js/ACOk1TXsVOoPPKRO3Tj8NmXss9pxsVief7PnWGj4Udh2G6w6b/Ns9lBwWvTf

o/n6uxmvUx0IhOD8yJUfFwjLuFqD6PWQP1DyxQlLSG
YPmnTTngKaML0EZw1haW/S0gxt3iFKWuJmdVXhdbisp9cykwQi0ysLz+R1CJyY8Yxk4/qc2XP5YrxegC

U3BIQzH46XDbYgpbE3fAsu96RtdRbXcbdQHapnq6yEeFQvMcAW6wu/K7h22eDugk6oFRCaprtheqLAYt

K1cKbuhKXu2BEvW4PKXGZeAiCM7KpuEDFFgCV0iZ0j
nbb1E9+lBG/6bZUgzPhDH5hCKiQYCy0hVh1kgpCPPq/LThX54AqkS4dz0nxe34Xql0iEd9mYwAwxpwJu

LpsuvMzVejlUwATzmK+ibQzOP7gGIpXFzfycXIgGNDNt9IIxH5W2mc18nGiZem3z7sfG27oorFX0hPL8

cPvb2KurTnHIWWaWMMB+i9WRwLkpbj+sv2bw07nuf0
b5Z8bdBmGXdVQy9aS/5z/JXT6u7H1+Wu4y8/5RAKFyM1tbcs97/fhW38dMZnHdZVG9cVE7zdFNNm3F84

x7Qc7uphmTvhVIchAV7og7cwDEFlyYBTVdIlfVZ5yppFGAasej84KBjRcMCsAO4pW94L+nw/WCdmiZMm

1KiheVL7GWcyjsDjTdFjs9aDoGC2tnVKCZUujdDtof
5Zf9NBf5RU+uOvNSmcbMsDX86lTdcX/9IGeTU2GgqOPK2Pl0KeH47YAMuTJewzz9+EOH/wZcCnH5U10E

UOX00F0INS90Qhl0b9pcWjbnsiC1+7vc5RcN0Y0lEBOMh+0PfoKjNchCc50DJ3L3aVTr8M/xrNNxTjm0

/KGOv9dTl/MALhwYOdI34ZHCjWXltR7T+h4VWyC8I/
ybLg16zQCwB64J8vhQ+FZAZXVr19/iUpLbnyRar3piYTMfbjM2xoaHtM0fhz/mypFLKgCBy9UvbGp05V

H/frSJe9LM8vfGaP6Qj6CzPVH4ZjzC+3VBMaQkn2RBrT+iG/2aMZ1q84aHZn36GABXVZBluCCnpZtmZy

HokytHa9gE+fjH4YeCY9Y7/uckRR/n01fI1M0MqCO8
y4ssHwtSnTI5wsWJn+6RimcIZYLUB/LTD34zEy80H205gfj0HErXu8Kpi11K+WeIriVU7n5LQzi7/5nB

DgpXSncBFXe4p5heqtZmMtgleDSC1yOyQhMR5lHRjUhhCusST/JRBaN5BRYRCRsTvP9PejKpOpAvXkdb

d2QdPVHOxlOfz9P7GIZJ5tOopJiQbJXRCVSUM2inTt
4M06lbVzUH4QY/ST1WPMtIHJWqoGr6BnXc+dHJe0d+vxs53EZHuxE9LOFe9b6yBHPrXHAssP9xDhXuUK

D8KTxyeP1n8bE+yYUvFL10Uj6n3KjQS495vrM5M5bGvdzofYmqKuiu7E7B2c+gylxEcYiayt/QFq4MnB

I20G8De7RWODPJmo++FYQpZaHYXO2LeeUQlZSfv+Pf
ECA9Woa79ttQhncKOYXR1dxqU7NHXTVRrVuYovQXDTJUoPheWUYCl2/7eIZE/ipu7JW4s0cz+t9uicpO

dYl7Q3p/teSENdaE7xPfKVTm0+u9uZKWeBbyBBq1y5YepMn/z9emXeTe9j5WsptTwEaziVxyOnwZ21pT

Qhmsi/wySjI/2KQA8QQbX6tyR95dAkTRoSNA3BS1re
PwoW9PkztL5ftegr+qnNtu9VMxJVlEi/o7/Wd0h/Ks6fARBW2Mk/RJk13U8JqVUPDfq/nVSrFkI6XVqS

//g7bfiTFtm1GoUYsML5Q/IVwtMPon6REFUNMyFV5UTMElbqnXjm7VLpHpj7ur/1wOa1TixF4+Gz8nz2

CRComVL3WKLLITbqY8iF+suPy8pra4s4dtDtMI39Mx
qy0NmSFFgRlia6oFu5xOzfXZbUYmbB6hv42W1fRbhtp3PRNttZl+w//dszCKEWKpGdnsJFo1CmJ88g8H

wH+Dhaf7A3pXgoI7Wk/H5vDq12uiLgVqZRQM9Wo1xZbhs6RApMQumNyY3X/fu+nUGIcTK5rVjZAKZt5v

k/XxDVukxAGZdXdfD3+vwNgPF+ewMNluUVRnel/PCK
0WI2eonLZAh3Z5gACV9jdsDhR+jy4ertzp1rS8xbPDDg7xC2jR2v5tKyC+dGegpTJBVuIN5A8lwZ6f7Z

LAnmd5doNIKY2T22k8WILkBp2obLjdM4/Vi8X/CU6t1luQZhH3Oe/A2D6vgIS8xD3UbRR4c1vBwp8jfr

79tz087wsbxU04l6AripOyWcY2JVCaoK3IiA+KRep2
ZuPxp0XwZtpqyQiwzwh23b51dZQp1kAdr2G/wdkQvH/SbLB0O4rtYUOM+OymWHGcNr7jE1BzcYP3ZeL5

tenda2hQjFBXREWGw7ML58iuAoXGq9ulg+783b2Ba1HnNbWiZrk468vDzVsZK7niKqEeByUX9+UASe4x

xmq8YCc4q9vYWcygreun5pedGSL7DuI4q189DMZdHC
w/QCOIU3ZR9fM3ouDgEL96zQhPNVZyiU/N2BPYgeB+qtpDzwTXVRzrOiXrnlF7F4SND2mTxcJe+IWpyG

fL2ITH7Za7x9ZfUB/nSXdyMuM65NMDtwzsDGjaZzkiPDGbrwf8Jv+ezIn4ubp9GS1Pg52+gBd4AE+dRn

6bom3l0+4q6+Mdzpbv1mxzOUu5vwk1IA7ZI5fVmnzb
Ca922Drco//HlmxMQ3rTbMh8jvMRCzgae3ODGorOQahmf/MeFetpymdq/Z7V27u8wv2I1cep+AN7k4c0

136DsVtcoks5zjvKjhmRFB/GXPSJw2Lk81TQIZypz5+2tTwOpjjuSQp7og98UGSGL0mnaHehxQBY/Ots

teuu/iLptUtw4ScMU8csv9Oob1FziB8kT3VNswyrpP
BfO7EotqIddwQskDCxAnwI5Ss63ql+oGosvk4ZA3JaV1aQfaLY234RC0/FElJVPft8v+x8k5cITmOQ2N

ML9IGDEEJYTwdKTlEC6ItzI5FJcqi96qEWnCfXDjqjmUXkIKssttudtmIx+c0quzNkkz23MIZe0+fDtc

wSHwEULzIYiN665sEgQ57ukWIfxQAvbrX4PE1206Ah
Hm1b1fwsng9cDl2FkWQdY+wRtjv9iMoNyFBrYueWscwkUkmbjoT1sPN7XBQHdS7UYPJU3QGmSmEdgtNz

niq59S5Scek5IJsHR5AgCj/i9/hs99hEeh7lmxdlllGZ7UY7iFc1zs2f2NkWGx78ls5OrOmLyn4kk8LG

rvjWdoyyYkmVxszo0vrOmD/u3VB/H4B4xlU05//dyK
/l0/87yIYiO7qSxX02ZslRHCGYKmLC4D1nOKmdg5AkGDmITdM43lSzAQ5B+Ss33Iq6ThNHOycIlyf991

4SHcRZDaWpJPAzO9XAulN89i3h/fqzl2FJ49isFiOU8BTMUJpMYKK9xOlY5mlpEtYG84hgFtRPdm0aHX

WdsSEWiWf1EP6n0cy/CrEHdqTq6tcZiT+HX3E1Ivdk
PkIctexPbbjuhsCxrubSA4v0e1iwqI31a6OTmvy58ykcTmvt0QUwIulvIImxuRsBMQHFsYVbxAlyLkeB

Ky25eFRrDwXUGFBUk1sET12SRAq/14B/gB9VWg/9wJhggKxvoH6Ahrrec9N6jKhyPgN2lrD8Ajf/oNU+

NOxukY8I9DoDLe4mx27L0blXx8SsWQ7N9R6EgLWUEb
1ry3O7iRCh8+cp25npPK6qDuDgC7yS9qqc7xLiZMqQ/FO4E1D6CzFhSxkhLg6mqacxv7WHJC5wt/VV4J

5CnVnFaraYEptfd11oarFYbPjYpYy4DE8061hKjlzJ4jJsIh5Jsf044UWosAAmgW/eptqcPwXh/jczbe

kwkVddxqb2Q/CCl/bL90upt6DbK00Q7VMmTFmfJriY
O3AHU42C1x8Jcq3uKav5U8wssLuYVVf0ZKzkpqNIiOL3Wfekod75VfYxhvPJ/c616ovkPOpImZ/i7Cih

9yx5NqAbPAEozrzeCqmbswzfCr8yodONqb50zW1gqE17V2ZEakWdMlvsSa05cNgB9nYhqYA0AR/aPgjL

LuEISeL2VJUaEr0EWoXHtdg4sb/463t0b19+a4vg7h
LFqjRKJlP+tXUBwBnzC18TR7KxcQeVVK32cwKqPOJT4pA7Q1Z3erroo/1iSeAV7Z4Dt2ZdlACrDCoGNl

Ql4rwx5e/HZwRoGl22chGTU1BffYgkXjnGCAW/Vbi+R/hfRVkcfNctQue6veHKpLZTLJ+hcjJKYC8kQk

bPA9bUkdcfN7aJb0SiYuwHKPxojB5z9OuIYS1UO5iq
p8VlZAIa4AI/lGDmEAwcnO+BXoSz4/akUXs4246sGe11/uxK1ZzV9RRLNDesh3MRGaBGxQzSCq3/GhHh

Pbq0Z753hipKv7IxbOaZ/iYO38CBfFjcRm63KY2KS+JJVIWx/YyMLdl7TytGuCsr7BQDjS6yBXoiLn49

C1j2Hr3r5OJi2DTS8M2PWTvfoUbA45qWQROY1EBeAq
YSstOKkpx+Bi3g3X7DR7N8xZCOhwbXeeCHXR0LrWOB0YNxA/jcpZBlyW9latPOWZ/JftI64zxbq3hdto

SnsEO/nZl5P+cqDuipiEN0spHuhDi5MV8D8ZxmzuM/H317Pc4lBKh+o+BdGjhIt/Bsi3GxChtBLwD6Sn

jJ+Wg+t6y8EdMR/uoixIpkEM4xLsY513x/s4A5JwwS
4gQACEhjB4TJoXzSbG2p921wMEYMgGyf/aJHUSmexhnemHGh/xyFNSmbhWggBVE3Qg9Fi5UKVIfDAhD3

kU4UPJfYpjgTEZoH7WY0mTCpNC6x5wzwq7IVbbN1G0XXBopQkWR7cOiG/ZikXzfwazzG5PMaHwNsZWnk

IQHUbYxAVPGsjzuIITLCPwAFhd40O1RbdQIt5pO+cp
vfExiMV1woMPfREz01rOX+14tKsANkGUHQLu/3/mQyWQFcBmFGi73DVl5RqzsRVRcGnecOiQ2xGVeRin

xm0YiIjhItqk8vLQhAM/UbPCYu/3Ht+IfgxaIK1okqjHOk7S8dd7/TQ1Q6oQN2s7xFtu8+XO+ZPicKqx

GsHcEvaTiamaH5aA31Rr8wRkh7T6bbf/3D1dWAs1AO
bXymACg5uWwmhZWtbXL9w5aw1UXxwC++/cwxtPG7AygLyClf3iN9LvVMxL0izFBsuIxjxv9ytjLX6KFM

RsKUqKI5A6BXu6osN6q1+T2kzk79hPAHb1pn0Tj4qQbE99tLiYl+Tvb/FwC7051UStIQ+s9y6HpStt2U

k4dphZwCq5iCm4y3rbfrr37D2twUL8QnkooAGETIrr
kiBCj88hN63kEykHD6U8H+Ol6aZ3VhMams4Q64HNYJuoBcrUsf92vWvQ9PB50YyTEXwa63RWqJa6uHRS

+MPkkrgOH5pImhaQJuM9vJgTRD/WsSbrM9U0ei3czVAm3lVWWeWcU8ktmK7r28LuqHsaOcLpoE7WWTVU

zEl/OrtWqXAMhZCepLNwZt15U4RiKja24th5wlpWxt
G439z+CpgpxglV+gPNkqt1VO4KhE71BI0MHQhiv0r/vwOx4BI/ZS3C/LevN+WNKUwnVAXD56drrVxuiq

Y5ccRqQMIhZvSgPPYHZ2MZLg8mdWTIH27g+mxFRwZ2r/8fyKsG2Xhe4ckf4JNFNADAiBpGABbRO8/3wJ

l8i/5MeyZQcDCWRO+2AnwCzsqmDnBUFfQQwYVY4A08
yaZiDCO7lNumjhrq50OMyb3FJ4Nq5AtxpRSVLOPiThov4BPOIVyN4kW5K+AMbnvjaQQ+19+gQEgv9i75

yekBrhoG+7askOLeS404Z4KEjIr1IACHW+N4rLkItScs/ooWtelwvaHh/wAn4yefLAiwdFj5/52gddJC

bxR7PQucc0E00t+T5tljrNwrjd7h+6zllng7KlWG/p
0Q3nxV+FDWAir0dyMW4QnGDldIYZJx1pc4k229KCpqaJWYvR3Fmqh2hmU9nqffhs0AQ2heiv2bob16Cw

g2ni6gzFKz1qMgJpgWn/FvX0gc7qs6PSf5XWMySdhwm5/387AnCEzCfK/JDpAvZXjdA7o3rXknP7BrBw

5T8vXDJ0o1fuciOgf6V/pj1qnJrjOfLkAA4/nQZ9eu
jKU2QmwS0fmyA8jza6SUrhPInT3tOf3w2Oq2ag1qJ42LMlZJctK9DlpbX1po5Au9IyPA0Akok7AzUuXP

8JzfEt28E4+I4d9lN72I5aHhhNkDuaKqbpnPEf9aLNvKdjSOIf9Kg+/t1SPirkEjm8DzYOlyes951V28

4dWYecXlSyZjCC6cloAjUT8LJ5pNoiTaKUXi/nEkW/
n3w64YtUxnYVtGWQgr/ZY1TsSiP+dx3WJqh2sV49ptH45UQeN9f2Tpaqb3Q7DVqX3fkq6kWKv1qtK1l2

G3ps4tuvhDFSathB2OCv4L2rIyRzHdEhf8V35LgfR/SoqTi+dB4U9y3l7sCBD8o3AEFqBU8I+It/M2Eq

jhiQysRpVlO3yv3Bb4gBHJNl0LdbvJM5ctTRCVOF60
UWnlhxjGoQZ0W2EnjhYGNWXo0RxiCiPoo28ok2/MIzdTBoYnI4CgWjLh7h4i7xfeLyg3LjoWRD84f0oc

1efE6qmvtfl2Iy+ewAIGiKZzAugV+dWe50oB/TFyNINFA2JUjrodkMfpfdZNoE5jUULmNnHQe+tbMXuv

KEurGuQwVrWBfmh+4LtJzKpu+VA9Db6m2hO+bqrZ6r
zDJpPvSQMCBNNV1tSw2vAzeQWQvwkagTuDZKiI6VRh5LsT2eN7T479PivYwEZOxdqTVX9A+XRaqhKMEe

WB7WauQuj+pTbF4u8H+hRGvNuRVP+kcCGegHNUQbPiHUBSPduyk4HHz7Jg88227+AWXYOj8bWuYBEclT

Z2ifmIP7jQ3SQC+kG/5RyB6A3GeAeCbX4fKVhDDfWj
odOHivfGLSnR1ri5un0SaB8qKoazFabfw1tBjDSeApE6sfyct9o56NbHZC67ywi+LoJRCKhHEMFUUAls

moy4YYJo3E5xLwspwoM5M9L8ZMiucpysowXreoDtjlxsPtchR1E//o+qtc10Pgz4Pi31DkvcE8BMUbUM

oMGjwHB502UUzhS04MVnJbZwX2o+q4dEkOaO0p5q/N
OfQHjrRBSshA2aQmyt7vrWhVhUkOs5qhv58qXDpIyQ8Qw3HP1ScL7wokRKRJ2K47rgd+60px+4vez61b

Y7kLKuAUvOPWEMw2BOtUfGZX2dKtP6Rgh6/NuxuuA0JDAhFSu65D+Or/Oz4HSYZ9BX+cQcLFZLG2pw/l

i7tpUEs9PmmPmy8qpYRNLFbC/APtU1UlLBXtx85+W2
7AvkOcyJ/VtiyxeHwyzelUiHdzwADCk5nnByf+3KvAP0/PFIHvz31Ie/hObg8poP8dqhuau1Hg3g38EN

r774B6d9kpa4pcU7NUfX7xZrZt2HU/5+20sXeuhNpBDHPdvayls+OqHMO0fSAS/FpliuXJavu3XIwWIw

V/BrdPG4jx/63Pb5H2OHJu08OlKD8dk3/t4VbRHv8G
K3bCCUohS5Cl5VfnFVGIxlp2GCLpJnIOMQmaIkqoca7c3YZ8j2szdcnLddFt3mI1xw8moxtOpmOWb6f8

y8f0A45fScgPIUwAFQ4Q1nMq26XAjFrbDiZqwu1KOS2CP3f/Spm8pSiLzMFDcCzQNzXBWeITzmckzM9J

E91b8wA3b54u51/Xh+UqtClpv6l20I25ym1/4a+3di
qll1++BlKrwZnFDmak3Yuo3DGDxCM3Ha8NhOqU4z7uwbuJZ7eYPjZ3Q67AE10ogTlrVDpJj0SUsng/KE

+FKA5THcvpcFqrlxrncj2fgDenC29zRRf+qiPdKtqQ9jm2SMNpQBBmJdkCNj2He4D0a58m9UuhQnVteh

cK+X0Sk7gs4/4oLMuSoy4q61Uu9Aj0khswJMvo1jDO
11TaOfXcn5UYOBa13B9TPn/sNLS26+vr4nXPDl8ySYP53Ak/rlfg0w8JtPiIki72T1YeFoztHxIpDyT0

kTasqzuuQezuPTESQXhE50Rs7NX7hpXKJRyDp1F4yN4TP838vXw16rV2X2L0YukVdE7XjVYNrRtzs8mE

+v3nw9Wvvj8iFXxyg0+4hatYe/xpSRvbFAxnJQxuTo
gB48TophS+07P3CjRlQ3OiK0EkD9kMqNLm8GVtBkQlbRc3hD/uOfNa8X+JCQCmw+Ej7yJw8FdG9nVlFi

hUB4CQcu4xMT+x8/0ua4RQ4P5/eca0JdqgvaQ0OWLiq2i93f0Xb+2sdKTCSikGV5aPpRfk5Wxnenpt+Y

hpLJn4KInMVmVTymNB5pxvZw52uPWxIPqrR7GQYiyW
15VcDzDlIrT40Ckg687kdVCqXDyS0FlAnKbQK+ozO4ab2k2639mcSh2bKGYPf9xqhoUiYkheCK6xZ02a

KfXGoTE4A6v93hi4yRw/PqbYLnwB7TbOUEnkdTwE8LNrIN0oHjEcL1l4/LnXlWzU2ihrTig3wR4yXnih

y3q8AzK1VCG4yQ0Oj8Jd96BnyHXuSz1XlMGU/REVqH
f5y12v1rt1MNfk/MLz40QHQlpOKnTl8cl57EaVjjdCYybqsDm5Ec0xZu0CfR+FS+fF1LYqzTCtfMB91s

2MK9HIjJt5x6PykC4S+ko6qi11FcbvIORF3lJWcx5MLgEuzzpBTiGzB674mJ7v4dgyIbLltfgePfn7MU

f2ghjijPNrE7mFss/+2IQJvxsWiMdL7J7vC1Tiy7Tk
C/XzSKA2yXI3xDHA1VS5/ooBuLN/dPZpuux71wQdvrM2oEp6pdyObzgpaMcIR1flfG/AGBNYzRGTdDBz

+Sbl+hxkSiJWg0RYrrcj8b5aniz+4hNJ7RurFTD8xViPKhHftr1widhjzkQ6mK0B/zBViKAeNX/0rpj6

h7n6MAYL47IdZwaCiIa+jb95Q/1ybBj1vVj0QtAFCo
o3TykaYB4TtwIXHNrViMR237wute6js0skrQ3pqSyNL9eoC8KDsYxfJ16J9cXpv2p7Z4SozWps12xFMD

+EAtdJ8Q+NZS9zyEOn82Iw1OR6vom+VEdkn0LCvdXZ4TGEX5zAgA9fB6s89Zb+3ZJetkF8ztF3+0IkRH

1uh55gO28eCAzJ4kE74UyelWhi9MGbczsMcFv1/VLG
IOpkMHlpzYEIcSDDbKPfEdy62bjEy/jxiXU6MKgpT/L2ozZyZBbT5u2+fQZ/N2QkiwIPoRwzgAiStCur

OrOn3doCowVO3useWktsNE9xBC+6e0BhXdgNuzpWjZ2gnjcp0kcvrdkkrUSsVGMRxE3uqg6T+dEpy8Nh

KnbFsf+V5/KyCNk+aqNm3qGk6w/IC2B987+n2pVwNi
+EY4GlXs0vNz0iYQ9sxhaqNOTvarqEi0F73nhWNr3Fj8epWuT8Zhq1NAOHzpmaIIWBdbZOnp4tUl4V3V

oXB3d3gwF/H/W5rKHitBk/tynFpHlqhZL2v4dt+i2qdjWjFOHadbeXrnwh7cIoeW0CZvmsK6eU/RF29m

qCBHMGnmJfgzwZ23qc2DwI82PRgK6vMH/pXt1WWKen
R/hxm1X5C6UNG62e626wCHhU+bT+ee/CAa7gH2h1FbHCk0SR+btBPN9/Tjd9clWHNdwu/88p0F2ZjGcy

q+SnsUWlvWoRxKTKvPSwzuRvyCUzN2w144pZWbc+f6ROKrsSgivzI/erwlBMVgk3LBj3tGaMzF+obUzm

lNPJJ5SGmPWJtPvpV8knWAER1ddGxICsGMF9lYKim8
rHUh4GO7exp5e7SqHM6Zf5l9bToXvyenMOSSQ6sHqula2ptBsqkEMX3tlt2KXrNBsAymSqI4YiODD+h2

arar+qtvI48Pv/u+bxeKHUHGGp6aqDYD3/PDN81bSeq8hIPhsVPMGufwL5tHY01F5k5Qq0NJUUCIadcE

/c8DmJMzTkSCw06FYNTknmczI649HtlsajLKbTnRmn
eSrqCdZ3RnAWdmvSVD+vbomVTrsJIEZ416Zjpyct3YNcIP+4CUHr0ULcrDGu+xxWCtqz20wNoJ1QD5VW

55bJGSH6l2ms01jbpSBw9h40Bk4WySjwqC5aRao6B05BQA1EPlmu4anLizc5kIqxuVJW2rwKbdJE7O93

2NztNFp1voAepoEcPXEVQkEdyBJo4KwTY+Lwbb1LC7
sgrzijRGfaOjoT3GS+OXe8Q5xlffpLlz4KUvW7qKybk/TZmY3SMWyRLdkGv+u0/GuTGw+oRk4quK+6+J

u4U0cITJT2Kt8oyv9oQeHAAeoTkfqY+g9cyDhhpA6wB+GZrzIzQH8J2isRaMZcLdg8Pd8JbyVjHi9Pj0

fFbqmVIT1z5EEezjIywknakTlAyXUieww6SJFukgSS
8K0pJniL/eNP++VKVfMu3Nz/ZR0l4osj9eIvzWsEBRaHjhWMyxalBG1LKb0odmDHYl2s57t0NFe8sa2B

egpP9wPHllsOHN8NRVp5H5b5S8RnSgSfJXTt33sO3XtdJvk4gfC7lsUhqjU6mEHR0hUhKNXgIeB56Ays

NO0TZmRnp6T4gqBNbgT6zloSur6QJowxatgGe6LJOm
J7Aed383aBUxzdip5ipvOv9nqyG5jOG7f9N1L7Wlwo+3PQ0axkC/6C3KIc/AYm5xW2E6VyGXL3O38Aq4

E8oekvodyoetG+TzAeQgP9mOrE0twcAVf8X9e+O2hRDGYkkVrmbR17ORRmPJ9sjWzcY4kBFCz24ZWJCN

MLy3EidvUvhRcSteuaqL+S59o5tqZSIo+b+S3729qn
ESk+ta3d8o/GEQFUqUaas8Pg9hWVmE+0mR7+w5DkjmyDm7qigyBY2+6hN7a9ffjcNanKj51cEI73UxjQ

FjnhEFZc1SaKcp3mT/LgpF/a2+Brf9cHnGtLj+P0XKQe+O2r3iqxAXwPukzMhHJZ71mENosGN9gglPsv

KRiWWIs2x/wGBTqkFup829GZdcz+2MWE6U8IExE1GU
A5KxYK9eAUJ9X5fyqKpbwBvf78UQXa78eJUevHHsO1e4OTxEcUqELxhkH6rxsamY9dTIfxcD1xJb6CPo

kxxvlzcxjVnzaOndMO2GjcpbMzu3dBitY3zmyorz4gzg51zGrJ80ijqZyAkgwRelRA9eDh7Xsru+LVMG

jL8PP/TNiHOwg52dI1M2nTQT0GAekGSeILHk41h2O6
g/YXFVRTYyKoxs9ejC0y3nXsp48RdyAFD+HGOq5HMjGk+51Y3n2nTvcIil++I03tUd2hqOkyESnUdOo8

pLOHsMUWh3P42DUmB2vCBtvykfBMHLH7VHCFeNhy78yxA2eLVmJf2ZSwqgwDoi+0v8Han/UkwAyN8YC/

cCGypaUzzbE44b4DPS1upnkBG++WPKWEjrK+szuoTh
+LZ5FGTLmgZqQoAOPtcZCeUUX11aPuJ406FwTTNW8Y2icpyAkFJuLSyNYeEr9p8p4DVbnIRTVU/6w0kT

XHK9PAtS+4I9HhUNpck+9ZnRUN52YYBxhHmJkTQC7W50jSQhHxSLK8+PTmSJ/ysaOc5cZ270qXaP6dSH

6xMwWeLDvvLzZLwtQNf7lQrz6Bdc6A6P7F1VF8seW/
67ptJna0FS+XvMi5EEggh1uf8/T1lseQogfYCFWOhCvbZunxfqO8lOehuUXZkopfIGEfUsLL7S6qwSif

yTZ9LXrx71ZjWCL24iyD9fBYayVIiGxt7yOV4+Jx7KPy5wv3bkI6D734edjNDgza0ojVrq+OGU6Xqvc0

e6caFhyeVn/SVHYKcA8mOorZATkC6OcI/Eyr648aHT
2DduAXqRHxZPBlaqDaVo4/zjvdEPp+Zx3sAP85W2PtP+8zb0tuWvzqd9PUqFlCcql6/8KKtV9tZ2aGC+

E86zVgbBvlDRBqJF/c5nWR3dTqUAXssP/d3xfCRT867yLw0AzCn1VukhpXXQgwZbkPsHaiVH7pjjpWlf

X7/dfnuwej2iWy7OXVOH55/RQsqhmBUYCU8cvCZbRP
Kb1szaYtS0L/Ic4Add04OuFLLoKMCE8DdhKmPuUZ6l6lnsd0zIxKVfmixbe/YFxRB7csTkpgGsZk4cb/

ktCEySzAUze3QIV0Q8knwd+9fc246HF72nBcDamLefc8ZOC5cz/QMMWjBjgEQCQ0QkBkHqlMNE9poRG9

GLaXO1FBJA83oI/wKOGUYRmwLU5bbI94NqOlYlMWQf
4jYlgGg6mx3khYqbVjgJBd+h+CTU/S8ibEc2oalQV8WaDEmPIeubbRP9JS9dCs+tgdTuPEXw8SwIxK/3

YQ/z0EArFbGX7oTxsIspLAgAvFp8iS3No6KF9pfzzHCMelz0S6O6oBtOpfgrfI08p3wXEAEYOdtvJSkC

EOMA1e0EBYunyfyxEVIPj+t06VyYMlY0VVtT8d1DQg
xYeFfbDDhNkFX0hdQA/RWh0Buy9FhpkQFpYiDeNY6+3irHfBtAuQQ2AbBuN+KhqK33J/lYUcbNBHM44w

NK33Fw+D4VtjxzYJ0lPs15dRsr2o/F7c2MA6fv5IPBMV0iA0bvVGw8Pl9A4SV4DoivVxwmTfDk3GQq2P

VfPEX2Q4rzFPdVz7ssuvTy+xB+NFwNQfaoy5pImINs
HnGj70ljmU5EgWRZCMwLvt/VdRcmLr1HPunOx72qyjGx0w6iviudAPGbXAdYJ6GwrDoK4DW6ocunAhIy

az4yzlur0F6skFQVqp8+Wu60Wh9e8386qHQEge6BqvNPkd5VN4HdES9jmQp1AnfWEVQMS47x/ga3B9Bi

FEmusObEiTgJ5twKPxvJWnttamXnWjZC6IkoM+C7hz
U88H5EiDLIaTHNT+9VRD2y3cDSkx/jprLClVj8IUCIyQ1LBEr+iGP0AFluw6wMsSWiY9E8Qay1m/geYN

qpNtgQY95zB4RtCx5G0GboHRAU98icnuaeZzW6/OqopSMVLmDqbY6tb4pn4csuOjYLgVicdLag3B3Yud

UxPaAf3y2CpovXpHbr4I26eByzXqZi7CuJ0I5dgw/n
Sxp5rh/3XxV5hUVZHt9ctXyhdQuKLuUmjyP57L31Fi9YiI/tn/UR8r0GFx2XByUbRYRJei1F4GklHsWv

J6lsoKDM8Ca2jA94vqpcNLlwS5N1r4Uo/1e6Hu4NJMS12hwgIL1cBxVmLLAS9EUtiCjKCQw8Aa0s8Bpi

jOYazBMLArB3Vp4BEmgZ40pdHmbnSWHCj8Tm0dJKhS
Tmi5+fuQCJkb3QvOxYsDBlSBG+QPYFrWjWYL8DLEMA4wbKgYa36RNbeKFfPbT+SoiYdfRN9q5IOCstm6

52YVYTKCTpvdFtt2Kc8oFyM27rS8mhTH0MxRIPB+Xkue4YtSACAzPszlUcgexjvyhN3mRsxvfur/2+qw

ruz51NrYN9PcrV2IRESBXj0bcHOGOD5SiLa4SD2jMg
uzw7KBsNU+kmsqCWDroRiywJes93H8iDnaGdhxF+GhvJ+elwpXss5cqAio1N1lXfE+lE6rucoXnKiTZU

JsDepsqajrLVLlnaE2953xU+kiPVFu6YGSq+KyZbfrCR5y/w0gmxNBsrfUSXXLZZ0eyjjujlMFsyq1Cf

G2HD1CHIcQVexZWU8h9BuLbEedAHr5lXQ41uPCo26L
QIRI6KVoVqTbTkgGEK9AYXuQ3Wd6Ko0tek8g2vOH6KytAQjgUqdPCBl56KYVQ9oVzSuOMs8Bigo9bAef

JKx2/A94NSNH2pJ7xn4TFNOa7maDjEvw0vIRoYLe8DIu69i1BysTxNwrdP8ZMWH9T8Q1cavtqcIKtxHP

nIZsXoLo01+EZg+S+SO2iCPYqsLrHGKSgcX26JM6xe
Di8lFPLt8o2vQZVwS8KrswbRDWMhg686x2bcPd0i+ZaeHIRhFI3ec+Y87wvTrhLMeHJegSnfe0kc0duz

r2gjg/lJstqmqW6/C/hWCbWjApZWZrZQNfptvGNfh7DwLFU0C380qizn44mhJykhlyBToArAScR9kPuk

flxgrt3A4qUoXTn4D1E7HmVUk9YGPmWuZ7a3TTm1Kv
ZZSk71Ralg5ztjnCSBYZUeQbarHIIZNYAyDtT4zEfRVQ9N/hDvuvjNUUiXuZQKdw9Sjst67mH2TJOgRo

6kulq36t+ZmdintvZOZtzzbCGdDi+FPeeML+Aa/aOe0bQ9sFaKtz/vnbnNjcWYKj6aRnrsDFBUa85PgA

UQ1XR7oSwa7wUr0EZYwxJWYCN3lgqzRwihjj+41Acq
o2LkR1+9q52j3pTB/H2tRoZ7Uuts8i31Pedi6f+vKDL7RV8KlIxqVOOoTTlMR3sej77mWeAV0p97pFk0

2l8hJvcYS/hDSR8oexZOLKYFlR+2ieEkOn9CKkx8N1iituMkXLvd/ZiyWiF+HULR6yasYqUQxA55beqQ

rZ6gNEMGZXrUqDSydy2pDEIacr1U48hPVkHKkDMwCu
tJjgwLPycW+clKp34H9Bq/bBnTXNd61jNxhkqbcUonegf9YMiHLhpvBa2MKtxelB5i5KAXOtwYM37J4Q

yO8BXvBUUHWzIZuGYHdHyI4C13VTeJfcmBO/F1Za8H8u4DR67SVTFLYnVPyNwxY4v77r24Qy55Z/J5+9

zTiK4ZOqYhE7NITDruI5vIhMOg5umIl8lO53sMG0C/
R6nkbFXJmk8fui3XP/1OSrKDK6m6aiPK7LDyvHt+Ub7/UbwK70pJcRv2fB/dL+uP9VZF+dXITucPkcUy

B9e/FJOrxrczPi5KcSBEhw672WHOrloZv1461xNnVip+/rSr3bTnaEqC3WPf1zFkJLkyGc/krDqSqMiF

GFD8XJLfsj+OamEVGzpx2sf98AIXWPVg68AXGVDoC7
9VBuoWPDJ87ghfflWghAF5aqWtOxLP+bpKvXCC0q1CdgWmpEmBimXHogjEnKi8mH/J/PTQnk345HxsIV

XW0Ehr7hBJq29LDbTTNFu4EoxJR2daBIp0XjJ+WgbCVAb8qjPC57/Uba4N60WnRiSRJwaMi8zhIxIDkV

fk5PTRHr1wheTq+m079xhBf7h3XhN4sXyhHur6W3f1
Rb/cvnV+qkLa0E3qLzZR+tZ2PCKrhOAaM6Vu8AarjY2PlGS2bGhoo2gBzCr5935O5D5rdWpeSBcRq9b7

6BZR6x6tqAXucXO3REhHKCFhPSAbujvuyP6LzKRbcrUyanynfc0nVNbXrfqye6/PpRznU1MVrQBnxaS0

3lwLSscKW9G07MdtkMZ2u0EKdR/2Ae7ycXc8wGeeqn
asU01RRlmmFtqCO2c0e3qmg5X+DvPcIafpzExocKD9KhjaVNOxPBYq9Wg6m746mgbDr7cizsB3K9ms+l

apQ7Wb20yK7n8Uf1QIiYFlVS562ouU/EEhcuDW4Wr7pDI1XkwFdHUCsNtGwJDim9F2UR5aks1/C/u8W+

HO866xUk69GTqtJ/0IqlqIb+9TcuPoywvGn+B1oVh+
D9OjQs1KL0/Hh9eGoDJTahG+KHrYfJ9c3qhaEhVF4+R0G5ifA2ln/M+xnvABPEfM8OrCY0ASQ7qrmHMB

65tsbrF9kH8KDFRulRVshff7+DefmrYVvuDm5F7RiPAcsKrx8sy8WFzgoiG/oEHXK2izs++ffkleqPII

Z88f9e+tJUqvN7VyZWFtktrZYRRmuDazGbU0JUuN7o
zXsXLd8QNxwS9czYppCc+lu23P64l1CwoXatdZl4BfJqB1aMibRvWEVumFmwCgZED7gm0OoLRLAlqSNg

6OTonpjIx/DtGZMqWY/shI6Pl9uX0DbWHZBmbCWP5WQj9dOI5dRvZ+sR4n7+JDXnk5ued3eNUaHWmW1w

iI/OjD+724alk81E1zSv4dTSR7hQJJN19+UZtslmcb
wODGijgqiFZnlDnKa6nNwLuVlIDGJBtFleAsAm4jZkP/dToDQOdhQssG0QKaY3lfbgXmJ0oDm5v8ENOA

L6mrjkTHCtz/RldW3+mwJco++MY1Zyi3H18FaKce/D0PTpN5r0OMmXQCg3Yeg9GK6LqglRGreqiPIpMz

s/SwCiaB2YLagsOMPYbQWN9x1s7k92EifocFaUxOkQ
OWIftrPIhw+qCJFDgvgSuTp8+TggnQt7/EcFOXPL8L2F3BerchulU1WpF1bJYUFcqctc+qNSjg/ZH0VI

wXP35/4OFhGImcMrstYG1+YjRDGz3Myt6HFb0ci++MOgOGA/gJiPU4uM755nMi8NXsQ8E5sdliUsQ1D9

GfUxHRyTMnD/hK7k5LjUTsmWBKpiyMWFZaHL3IvT5z
r+UYzLJ9wP26d1QxaTiZWSXicTPJvxcDFBSfcSsbw2F3NlfCmkz2DJhIgUH4Xr1XHRUNLVg0lqhm84g5

/5Xw4dM7uvb5HuCFnvpqgNmWERGp8cnVjeQSUgXz7xrNA3eHgPcOXNPt/7DN4CbXKJeK6uDhdji6e7/f

mXOYwo+k6YUfGZmWc7M1sQETgGx5dXjvLwfDjZhojq
U2BWpENSc3eSbOhZJbFg/DklezAd65/a/DXjzlWB+EINbu4AuD2ZPXsoisEfA5QnQ/Djf6C8n37Nss+/

rPblz8l2x7vDoyrUYdYcI0qffgwOz7Ke58pCEsMr46ViM6Il7R+xn4l/WF0rByOZV4zLLjHcdlhKakwj

Qth09WnJPNDT+hC0Cxrc3Ii9ot+j3H1VfBUJrZ+9Kn
Q2AI9T808Lxc4MYrb/9RpDD2Q63dB7ZSelHHSPS4b/ENhK7ZHMZHKLGTcOxsugJmjej4FrjQCBPiwCsU

LVxt/02Nd0P3lV4+192yu9MF9y6A/S1L5+uIpLJDoHg2vdiircfUsaXd3N290V6wZpqkFHwp942DzNar

ILLUozKzLDSVZkIfWKKNukqybu6PPZDvJ0czXOxq9b
lvublvFdiPRcqMV2G6g8X5mWyhfoeD2TWqzqTTrGHAucN8xBAfGRU4QyodiMAMnY+yay01/NYABkd4/U

9CiEH6bwy04EuY0Zevoyiat3KblrvnnwPCb8kXfgYET/Y9Qsa0fc+mHPpUwD4+E8vSa2NuXmwQO/19hW

3pWKQcNtjS3q2DNOP9Se2jsq+fwyYfUBljzUohTRRf
5hTM3e0KotKAxNSiE52YlpqQS+ZLWl1RmH7+iMMf7z/sD31vmPWKP2bIoKy7WZQYQsGz61wot2AD+VF/

YhcqqrxV5hbD11JIzW3qMbAxroTJBPs/zRzVRjUqdZy/4FJtMPNcAFwkuIVj8nWZi12/k6l85VsKLkkB

RaAHtFw//YgRSHIaGPNLnIrrB3KMG9tryEM2hVEROi
pR7WPQHXylhyHOpByKxIhuQn2SELuNff4ecHe+7JSak7pqKA09f7DxcLkCvJlsValD1BnuVn+6NODKnO

eldMYE4v4ewrd7YNw6NamsSAS0+AaYvRPNuYoqi/mpEKHF4XylG+qvTF5KAXTubYzBoMDb8sBRxcOql8

8Lwy5ucNxXEEKvAp9HRR1D4H8lglJEwN9LkQ4kwlyx
Oun5E71Y724nrBE1Af6zZlh5EHIwiVMaynYbiVEYwVU/BKP4iSYdVECcdAuOm6hvB2Upuyi8/Sc3MUKV

YngdR314vW5CL8h6tmpHyeyJ0r3WsryP1e5uzg9Q5SgTCebBw+1XjO0nU+QVj9vWYDY5DS2CACiMLGDQ

NE4sfqr2furvkV8bDjvi/i8iKlD8dIWoKJCWQXFouG
CSgEJUH+NAEod7rTFk+Abhishek/pK9R5MQFHbl7S92kIOnG1IOnX20U7E9LD7cnEBmK9OBgPaGnuEK8/f5ukC

BVfK5GHHEDJo+NPmuesSrPgQpn1KehrwzghP+y9pVPIwSVo/4js6vDB6rtGgudaqEvCxu2tXPFANL24P

kGKkAfc2si98v1bImukuW/WT9ijYA2Q8vU3KgF+Wt/
oOzsbo32Zu9w5+f/dy2omZLz3nd8wAg8jKgxaKXu98ClQ2sQhrcHBaAKIQQbWAwUJlfCe3kI5dQ/aZH3

6XU9JkLVAT7zIAggv8jxpOVgfOAYxoWm+cc8q30xWJ/1SUuXW95l7L8Ao9I0Gd2yDWnQgm35IbWpo3xI

qdCfBg5xgq1QIVbeLRyOMdHjg8bwKWJCk9U7MvZ1Tp
GWwvNlFSIdaKQpMFPsqcqM7BwvdSsvTLWzTKZrvwLF8SzvLCGK9xOTN3eUWn+epbZYmHrIFBxiGDz0LN

unxhdV9gCXH16X+sA7qXiFj4yhBijXTtxGS9HMim6z5UGoA98SEajdjKsIfXGXTbNlN3eo1POuhG/K6g

ybNOD24jU0ja7ICet01PiLCWHn8BWwLWJnLmGrZwM2
q+j5hDHb3xVBdjdtxvM2ambcGo8fwTUF889k/hFAJPSN52O0E8Ip4BfViK5zkzZL5o7kgOFJKbyXQYlK

W/wxu8p3UnlnDxQUpXxRmxFKPGTYqEILI6H6I11PCp5ZvrwvMKxd3NoMN2ySkckT03eqn/+bmhFU1RYL

a/49mTZq7BSUGPvVvKD99v3rgFZlBw08Z9D3Pbee46
cnpapGRVg2Yv+zcYhcpObgtooMUo3hD3tmZchSmsuDSO1de8G2kps+K/qHbTDdJ6XSP0PUs2NHF52U41

oKw08O/5DYhnJ7W/833v1C3FpoVsR6fQKDdDDO0j3dNW6ed+10pcEkmlQDcpKN0JE4uvQ7vZE6upRL8x

AZsFnhXUIPZ1Tj7zzRH2BJhewKnY1s20XWrEl4cLmY
rcsscMA+rfuAf0V29aO+gB1s5XD+53Zwlkui/oxOpPuIYYNIEowiAWr2+Ry693ZWAWqzm+651kS0e0uO

IJCTsFx1wXZzbiU99zP+1q9tXXkohjUeMYLeB2eZMj+Y442lC5Tq0oOrQhryFWzavrx5ga7vrZwpprXB

OBjSfOyB07A4S06wZQrgL1MC5GVIU6+ZD6HgBOU93O
Cqtu8vCUOaKUiPtWo88l+YeF6yWy7l403SGKwq/7wSu+plVy5ugnzp7cHeN2D5B9mAsw6hJHLHgp5LzA

D98aRjTrgn56DLW2K4V44nrAMI/5sd3x1Q3r7wD1Uc8dmnERYCyDnLGystfPS63sF0OD1IhnYiaizWGZ

eGXKrAvnsWQ/iBA2+mcRL03T0GbYNqROYOQdgOgP+s
ry1J7KWATJf3tRmUxMA8J/StCzI80HoWwR5jHmVieaF062tM4bUlTo37Nq8GSQbaL2CNRRS29ohmXdJB

fLoWSCAr56jKXw77g57Jix4pIIGg8zxsynHZs8p93LmgOvo2Tjo3OWOxYPfafLdD/jnHhTw5FLaaZHqD

qTYkFUoTcnz1h6BPtfFWNP+ATuZ1XM9hhzehtCZdvc
Jg+HeiQ8+N8ITAGDoabv27W0Xwd+MJ14DOPaFHbZLnkGvcWU+zXGYYuyvQ9dtM2UDHTVws0Ong9SZNAW

osueUgI/mZMCNFWwRQu+QeYOWI+7Ry2UbWovioJzs3YOIm6dp3OiyHDKkkPZSUKH0hWMpoBzm8cTL7ck

K9LjR9/AryTp6N9tM5b/+Yhiil+Mql2AWydH3qU+W9
ZzgTu3NzSzx0OiKC2ufBYp171p4eXmb17rZwyhMUkXHHYRsrr5VNuBqkBMGfCLNQ2kYb7lqP8x6uUW4e

A47tT4iCO/liTtg8wKWFfLFr/o/V0B0LhZmZ/Nwd0w0oHogSrqzxDAtlRqdFalr4JUnmHhDgR+2HiZXN

LTbu+1H2B+A9coB9rYke87+ThJ1yGAGZDpgYYtDhOx
7eoV9rBaa+y3ORjWB0gKM6oa9550bVizZ7XtG3H8mtPxpp+j5LkZMIiavWsh933fjEob4ASG35wbrRnh

xKOcWruCKd+8A6Aw0WgHnM4A/ldBUgD1o80WYGyCwS/nnxDEpgPNDrJh2DLtNYRSPWLEntwmQd6uEzNj

7MZAKvbRNBM06tFEXgEzaAZKhGrMwpLtyKsXg+WIA+
NOlhxUNl9zEKOfD8p3I0RgmF+eVGqWXFOZaPp03/P1B0sK2Qrvtf2zk4QZs1HPHdeTWhinNKx0JVdqWF

t97rz5dEwqlMV9qB9o1xR4nPXn9FnSn7vG5iPGiHhckJhCZhfTkTvSPmWWUwoDol0Tk5md0WqIffaCbh

ER88gnuk5kt/9Hio9cv5SLzTiXXq+AVqmtZydWzP0m
z+CpnycARFwrSczFy6R0fGSkY9LyBPhBFUHbQH2DkoMzYIznXufYnmtbgypwkAvYLsCpg1Ji+KbOH6sa

9NPJygca4D91EDZ7v9u30rNHM0h4S1+CUzohjxjWMzUFm4tNpuAE96+osuILmEKrxW1gdIbj+Yo+BF45

iuHabCnye52YW3/GCHSpdFPtJWiGxERELEaPYOoeow
ECWWKW3PmQs5XysnIbCdFY1Pdou56SKdaRwfNJnfnxxhqgY4qyyA9QtadiyGZuTvIAzRv+3/mNA2B3dy

biwHfyXx36HpFrRqre+EtSKAK4T5YU1Qf06gLGuVJEzSU7GlXO1YaR7Ei3ihc6jV9kZ9Ya+sFne3Fmqr

2cxhdblkajhxrklFEq73YhTaYuqbAA9Ag2Y4I+8sWc
fIYAH0oeFZg3UieE/f63mR4bnJhpa4HVgeWJkPRcW6/v63ErlELQsPyXJgp9+f06IaD/JD490e5GpE9z

SvdVqfFNPffcFTrewuZL02U66mo0SKmEMCZWoUmPhLHi7p+lWJnA/3XsEYFLfaxJniOGCLkAt5A+QAlJ

n6DdLF8gZpSq70OSYCcdLkdhERqTZXpvy5DzkRU9GB
O89DGfjju4AnkYpyl/SiTpGPurCVP/Z2sj7p51Cnm+z51lfFeC/1MpMbmnuYZ2NK2GwHv4wbDajMLd3F

Qwx21dE+cNXCvddzDJUJKFuMepT+GqA39rlDYSjI81CpeDtYlcKTi15//7nl423JSEeoXueOl4QVwWe7

AfBU1Opk8CiTYIohpg6ut6e2urdYAL0glhUgRe1AER
dEY+LFKgshVds2JoGvVZjW7LHRQ5YXBaphqYRBln4y/EGgwD7M04BmKmCwdx7rQn8xb+8SG/0pEEfeYp

PGQav7837lFV7X8kJ0MoZdUUVRit+LjOn0qRANskVDogevU7fYstiME+eFv680dSJIRtD5o+xfh4SUea

X/hGFSDlaN3kuozJh+DLvmXFP0u6+/Sb+TpclZFL8V
QkE1BFEu01J96/ae6+gLiBWWH01DPIgW7SblwFss2GmI76/QfkZfjnGoeMFdJnxawH7kfT3B46uptv5q

B/bO3YHvcWU8bQGafNoAvq/BCWhy8Vcd1o/xPdylOslQGJ/EKHo0h4g3+mhxk0X8bunZswatTTPj8+Z+

UBezxAHUqTbQzYsKKKVaHHgrDIoCMAojdFaR9l1gaI
zYJmrfX+bbcp7QnvbwVeHYGtXyyfFCdMVyhSArN+hXWyXomSL1gdTU3pUGy6/jGINiOHpTkPKpfc6g1U

XSDABgzSawIRE96OMoZO4cnGik5incURTpYZJBGwgZxhDNBN6q6CYKWMxP3xdaz7krviKnVwGzl0as/G

bKHjrv5r3LttyxnITF3bbYHMCvXJl5pfV0H4/HNqXn
15rLbOVSGqw7n0/Nf4K29TexGO+wMZkUaqBb9DRc5ii++5V58liyX6vaoMvXLZGAEXTTn/RMw4xtfb/l

m2q4fLO/Ux4WbTsS+K4sWRy37toskjF8qH86G6yvZ8NP0/xboeo6Yqo1B9wk2jE4bcnB/iX/Jf8l/yX/

Jf8l/yX/e8l/69T/ifAPqGCqpM4DXWLi1qhlsV+YfK
27zW/9aDs93ZLQL2YWlW/2BfkKK2blKk/aJUAVtAf0nsqgZ7Va8mnA5Qia4mtr/p1afja8tI8SllTlzi

mCcaFgyTQHY8XWK2PCcyVC8Z7vE+FW3aPqNtCf1SiBGjRsNMlnjr5uHZxB+i1GXQe1ahBOtwmVT91ZTg

QA961dIe9yD0DFy9Lvk2z+mRWY5mFoJzaVpWQQqdIR
L5AiQb7GhR2aRMsRa6/hRDGUKfE2q019WTSVFQ7HTeRbxVV8FaLGX0if+kOl8Rtk4ZK6knkVJaYbu260

gqjj8m1eY2b0Cr/pJej0cEFNfc4fRDw04PV12Y2aajS+rBoWhTjgug2jh2qykPibNMAu4YPaBEWrRbwc

C2tPwmn8Wg7Ew4a2JpKqMIreqCDD7KUOQ8wPJ3psI4
HiaIGb66epQxAWcuvUCVje7J9YPhDj+lqaeDtjGzP7syzwzfO+MGHkJIyrsgd8mbx6DhFdeyJhBDTbrd

oEWRpGBJI51L147oG3sG7a4YsZZTPpZc/K+nRnCbxcZF3l1ACrRwh+LBRFWENg7K+dQ8mv08fnSIcoii

YcYT8OIiTKvK2YyWvIp+qaaR2Oh5ptiMStm4NA0UlH
pbmhOdaqH4UltCNrWMh75rORLj4Hf5IvlB0v5HaRXUhx3qsffuDjHABgrshWwDswdDqPZN9dTmszDZ7/

LUyKG2kuIr5PM29c1XknuOrmzARNP2JVq5w9jpYTlxhgSGzKs2Hxy6V0zWnFNOPTVOmK1NuPffjUdrWv

4bBNuyBlyDN+8PQAddfelHekxAR6GOjrBjRNJYBiJs
fQ04mztllGICeNGI1E4kYxyU32fQP7NdWvQ5TwTrnVEtl/ZAF6f5rn6vJXjSu/sMMQZihyndHFD/9mtZ

laR7baYEz8sL3ZTfJZRip4kKPK3bex26h6fEyrOutIirylG1mgASdoZctSjN9I27A+Z3qOD9nAlCtpCa

LOV8wsH2132nRZZEQs9a4poKf6ygKRsvIwe3r5RlS3
ShRUVY7i3Wj1603X1+ZVPCd1JwwRH+zZ4ikvdm3Rp7vZ5vRywoCZM6xUAs5In9y6tkN+gnuQi60YX79J

nz+2oW11HWza7WUVWQ9l8MKkLyGrscWotL7KsPdrnqf/pnruent0Xu2Uud429JYJGDp5oq7Ll6a04QJO

cfBorZ/jCvvtv//SlWOb7tNwcCXHsqfUEe2vrmM9yG
jMkn3OrhsMR2uIz0pUfozTFykkb8CZfLz+UUBFfsS/1pzv5CKo8p/Gj/m0iC84UQ5h7RljfuaWkpREwJ

3oEoACjbhWuhU9wqEivZ8l5VGgxz61tjkh3nVxKhyZBGPMb2+1OVzsgocVr+WeozNAtbfNc5di1eUHvE

Wb17u/q4nM4MfLTyb5jWeIb3bwIrxTkFuskfVcFvq0
rLSnGLZt5rPDlPP0SsUkKbixwbLPJArNvJDJoasAxyRy3kJ4zsBjVhYN4+s07mHxaSGQCIV3LI7ezfqm

Qbcmx2Pj7Z5v67xoK9EtO+KM9z4zQdw7lCxvBmbmntnsDx2MQidyWse0H7HDWAg+y1m+wE6ONBjH56Q7

4HJHzankkQxjgyui6DEONHwHEg0ROuYPXeHpUJF4/G
hzXiIcH/llRzumi6nyfLJmc1r1vdY1KUwq273z0Gqc1QuoLSpO09/Rose6zeoVv4k3XWkSiZmQ6kxHml

4fcGd3RGYL+ak+GgbSXO0oefpKQXFb6hp8NbOySVV30teh+IQE5pDlcU8G48q7yDHvD0wgDC6XfiWINl

/PdlfKmJ4m8aOaBGi6x1xGVfd9ukaHovem60zmYf+G
oXmFgM4LvHXZs7cN59eBgCjuu0qs8OWugHfeyyjAV6DXmwFbQN3XGEN6XEWBu/sqqRmh3lqlNVn+ZyhN

rWqHUF9xVAkfPxfQ5Qk12P20byDBEVBDBiiBIOkLtWYAMvzyUFOInfKLb+8G2/FDidwJop1G5GxsWEEf

Yfyc7eio+Cv5+QqhbzwJkAmtrTZ9S7fpw1FqDxLkQ4
uTsIFph005u7suc5K5mEwD1T2k8N57aHMLungROdoeFFsFM76KLaG2pIyRVVdez2uXIgtB1ak5F9XnG0

Og1dHPvJh5l3DevAyCiSYNeio5tF9sFWWww3h3NuzNKtHuTlbK0xixoF3eouTqM5WtoapkhsXv21XeAl

G3RcPTJlaWvJ9XUQ9Btqortro1gaB/0boRKuBYMsvl
aCjyKITOkGBWuhNxWNNLQjptIAnjMuipITHZyV1O6N7Ji/qKFfrI6fIKHDmRjmYGMTs29ms4O7sq8oGD

MBmM55s+9RiwxSkhOVAc441JNrLstVmtiWY4CBlaAZ1s/IFSeytOaGweSM8zT877Of4v1vFAn6HefOnL

e2TKP1tQTsjWShcEXUkJmTDYi3cFhmGesSnG97BLu2
li65sWapBrsnzv+jvrcWYoi3rdaT0WD3QgLMJ3H6qXDWRR6PYps2joUfNt7xV4M4t70yExF+PN2a5ZIN

rCymKE97bnhYL16ZXUtC9d++t64WiN9Gv4wj9l5VLw/OnO20kL+EYkmgWCmrEL6WS8Io23ddP9it1Hen

Shptq0p/cziA7VewPTAyaG6/F+dnrXD7qEj96ORLar
FKdY/xh4/E094j2yfJrkT9pxvJU09nqu0R6uM19utH3kBhsgieFp603PyEmQUCj8wTQ9hyH7EhXPMsB6

Rd/vQ0JlJJQH95WvwPmREzdrfCKYXrSfpYDAeag+e/+MeKFe2/QuN4/fmDUS9BLtWxvSnJIFE0/9WI2x

zDk2hbuKsAnzjV4S3zlaNuO7wbUarlaukAhZOL3MH6
Ao/i+0m5L19X1JvizP61x8l78QohQBDvop1bKZrvOZUGCL5DEEaPszpuYiN3C2Kuvdy4aOG+xtynb7pr

rfmv1iPAxA4LuIBofDo3D0mhByOYLKb4PpZ5rFeBotAIVgnZhWIhaopvQ8IMCM6dJzvKHjM53olnMzER

sDGMZvKElfL/xvZeIgyp0sl53NIR5c5+pRS5ppe1eV
+ngSXswjxKMpZ1msoaohEv9spox2+Kf48uSu6BEs1ILfOo2d4gUQdICJ3MxStH1cpA1vli4jlis

bVY0oxrsGBvmsxrlD/tpL+Kjn58f8bxtD5I5nQWQObG5REg6PRfcd19MyztWTusS+Bf/f3ILID3Hj5Mk

SQGe52tcYDthmQ/Hi2+TxJNKOWoPdQqg/Ma6t7TXyM
1wetieUT00HqFGPe3v4cVQAiA9TTQIUHFGESWUuKKDp44sDeaQ3nxsWGZWvwXmQyyJu6Fwy1P8TZY+Rv

CSEq0einj1hTw8LC9+vSKbUuLk1GAYXs0B7eB9lmX7l0yUK4bwoFVKsw/p5dBLyLg680BRBemUgiuwzq

WrqNfIHs2E3XBVYFsfgL1tTa6kyqePDqmUXo/L4AXl
/byy+CdXbWrS2qfL+k0ZgYb1PCnvEWv9woF9wn3wP9D/WG2Cpy77ZyM6karG6ctUWOTj0DQ+m6CvVcIx

W3n92MRuw1PVcJWck21rY3HAZBEo/Hmk+4wA0Sae3YBbtnVxfAUOEQlS0obfGNXhrM7DWA+mP5De3Yi4

nwg+2hXUYdgNA8eyWJ5zGhiMBYG9QfsPoVh3GqbZim
CnMjOAODuXe2YRbnDRyxmKI71HMm4r7rNpBAqQ4NltVT59o7GTSAZi729ST39zlwN8oLNTAsU/1RLXtY

cSGRtwDBZ5V+xf97+NRTQ8yLyqiyqPCMakzY8DhunZC3u8CF2rVeddAJAxF8ht2n2DLAn73rEtF6Tst6

lza476z3DC8hJpM0WS2Q+xCXUbS2vd8C1sw5UDl8m1
PqLkTf228/nG96Xt9XqobBIpP8Rln32hWW8EAbxdgNrLjrg2u2ZZTk85ALzdZm7wS5rP2CB5mx46trvT

NAM7x/iM5rKof+Mynor+YWqT7p6txtkQwyP0U9AMIii8p34USrKap6QT7B9NKfNZcyWeyirc4WmPw6yAIT

pvk0UYaWq1UzgeaeVqT3e0gDmfq7O2Hyldh7na/otY
xPPHGFItLkyAFYYTq5KzYxVze5bhS+k9s19H/1PfzrH7fAYMhMszZqQJZKZfdefiWjmJ0xLXunQUSSa2

VaqSKGgJLAKRrVN7waI/rEqXDdlsWRcDcodsRUGdMviHMqgO5e6dx4L3Uth7VnB9omTndtJyTYj++Qr3

4bdaCM/VR2h+odIyEGdcQdUuSYV0GcX0tpkSfPrmGP
lo9/+WcNaFyiZ4fggFIlZgseGD6ZONjAPr5ooHNJQMS1JjvK0bruqnd9Z0dREBc8hZ5SVs0Tjzo2T3nI

CT7U5v4qDMng8/h0SGEGz7RO09cS8G+pDpzYciVz4Nxc0HYEAQyB9/6qXQsSAjYVPaJ1GV5Rqz94Tx4k

9gj5xSpluvEi3wcSgT4CFy9iNRB4v4TtSCvAmLWg2F
51aQuq/HSUYgykp9iOlvsCxWs03y36ao1p9SPBJ2Jz0uIZtC9PYNxdr2tj2f5tEnto75yTlLumGaGLen

JXfoKBZf0UeOdbhPK0us+z5LWbQ3oQbpQg+1iW6HucMqj2IBxIvvHBsIBfVF35zs7vJZ/VK/R9PW43Yy

j8hXoyGHTQAWRM/SjBruDx7IT5T4G8wIOg8Ea6HhJr
mkrZJurGoCVGc+KSOcD1hMlOrH/v+9qO2ugztzYrCWUyajF+3BJeR931qNyH+GbJ1kkDwuODfcpZxtu+

An8uaTpUwnEhM/tYhb+c2qmraXywZ6gww55MS86OVph5E8nAECjxM6Y2rujHd5OeSVamDe1x5Hwni0tr

Y3EGGUqIhrtgIr0TduRQPTAwZppJMdH5RXx/46xtpQ
YCWIFFhonIIgM2xwyhp0oo+DqK7J7F8M3IvoXxc+lF49ojZYbsPTEWq5xQIzx6I/8ZtanywPhlebvT+1

X2MYZP5vpFPw/v3rIKs9igRhK9R4SeLLhDhzdO0grvjH8eIab+uUOvybfF7WEl5MGwnlthe/783upESv

O3pVuHCSkPmQQ71XpWORECGu2r/KmD+C2fZKBXFu2E
FaNKjocp/a5+Osv9YETboNWNu1svjgCGmFNcC0w4T/6FP9f0MzgZMl94sdDpOYctKlImjMIdhyM1Nb3i

Y+sT0S5p0orYP0ergFfhQBi+gvN9VUXbLxu98lI5PB6tLLqAoLYVkeiSHg5s7DPo9C2uAkbQWNjbPb67

8Rx4eRuzRHv44JMX0PJgoidvBpeKiB7GIFE/uE8sCf
yT4dJihLFA6+7GupMn08I7OAGv8iso5AEHvHh6Hlb/8VlylLzmKYwcZmPoy3kbS/uYYpJMYK99PVLXVf

ZaBGnyfapY9YrikkLQ/J7RfgnV4DHKElW9LZfDUUpWDv/lwWdR440FbEkWy+d842UGbw8xa5rEqHjy2Z

fSFBsuglPUsr8JoAsLWS3DputzdsM8PbiOWEbjZbjd
Jr64fxgpFGHx39FzI7/Q5Ie5jbfc9+2tlkHaoI4go6EfkSWct4ObCrgii+/8Bk9U7B/UUZkz14oHDH+k

mxKzTtO9ucHE71jm1yLFQSX3QTuPmSIrOCY/5vGlH8Bmc7tezhPJyOLaDeS316L7ugYI8dIX99dZh7NF

qCefNrQ1aTtyc+DMue3G7DRFw054xWdolvNBixx/Vh
6s886WEu/8dhY2JZdotqMJItA+M1budX9GARD4ln8tREYSw7Lz3qcGWXuwbnMaB5w+nIXmOSW3LLpPUk

fVizeDisVA6H8wcODkEBWjgYAcsMUscLeIhmdJePAHd+BLtcc/p5JUKKPadoUUjUl2gYEPA/Zel8bAPA

mU6b2/jR8NQHLqW9rnLZLzL2JEOgx4Jws4CcCVfAJX
UoDEMx9gBF9ph1ibBi1ZpVIp1KGaxe1FdrRCiS18K6JM+ktGJzSrSvwY/mU+/uiOLYbyWjJ2aper66CB

ZsKQ4gzrXTp4aMfgdm/in13nfi3oYTjiT7qDUHtw8nDTh1GaLr8LHs/SlOT3TTa9TxeAGDgrWjzCz1nk

DfxPutcztlaEzExMlz9527gMgTj3ofbxPw3gN7m3PT
rblEfi1RXqoUKiXtl1gDYYh6jdfyYH6bg/ye/i2p/5P8Jagy13P0byGoOy98kmbf7XbKwee7HNMeZv0O

XatJUgZzouPzDq1RpKlpREiJg7uNcT+BNPaxkPJHcXfzmcOavACFz9S7ZVuuui88nFwl0XI44198j9vX

cGFyKJLDSPzqNBiDifaov56H3OIN+tedPdfAluNsnP
DDvk5cgiQNeLFcqJ84ZmGwLhwQioO+y5zRfpR3CPcjfeuBrOvUlUwwkvxlQgwvpZGw88/FN+SQF03q9A

0p661BhAIBLT5TcaWE50XI7p5yqv7+NwJGT7ybQil9QROZb28IA51z5B6SP5ClXTBCx19HWVL3WDefIV

ArVffM8VCIbbpYHoJSqBb0+rTfyCQC9+1bVi4Fd/P9
sIvcNuATROACF7NTU6BnxADu11/EraQh8I2j5pG9WvMZIpDu+2AiZ9ftqcnyt49v9NhH04UmFciOfB2N

s4Z9dxOhc1Rm3yOE/EGBpx5KZYXMrcWG4TlTO/+diMevbEOeHgN3xJ11a1prYHnj/qcxTJYyofF04VgG

tgM5D0TZ0W+ByAbR19kizvcqhaYZlG+Q4YpVTLSwVj
shIFXOhwkTT8Dp5CZDv2g6THfloOPNLuP2GoJo0xXK3icNf+WBrFPfSg0xnUNBTtdrcdYiR2qa7XHbPj

XOdWWr0nVNoJ3GLOABWlV+Dbz7AkZif5nVs/RLyMvykEFARJuyydlJCjmGwy8zavWZqCjwysbzjpIM3+

2W3Sakyo163VSZd/haD3Hn9erH5AI/kJoXiSifitXS
BKNLb+B8TcXXxyrlSLTdGW18mJEcPLz4m3TVnXhT5ClGyh38/HlI7uwsFYwurTcXBNKpXXk8DNvBrQkW

7VERqmt2AFAxf0W1bPdvjXVw+vf5sM1W5O5YsPPgLyKk8xwYEfg1fhP3fl16oB4Q5t5s1oltqxLCJnBA

un1M5dJiAunQm5QW3xo0JvKEojj1X29zDC7AZfHvVq
7S3s2RGcRCFJ+3biA9Of/OL8rW/PqjxizL6sn8H4ONCq5DaXVYvrazPXUmC+kJHA42UUjJGM46rsaCJa

W4fwzLicTzbsgKVP9BZRAY0/hxyGCOm44v5kWWwqB4eYsSOUBueTdZnIWPKhdXmO7fMh+gfXLIcXZavf

69auvE3E7JFx6J2Fqjy7pe9h7umPWwNITVZtLjUBny
WPCD/ClD75msekuXlm7wpld+enlPodumJZB9R+dYQhCggSgF/ROvqAb43TXhgKnGNFzWhgBxQFmXQyEo

5Bm8utZUBYVIjuM5NQ8779BL+b9YHC5YryQ/bkvuIW2bdWnQ/Kp12NZhIQ4r0C0PZDtuB5TI1KzoDgqP

sOYJVEvo8Fd+Dn0N2a6Cyqn5frWKK1F/NBJ4lZFYnf
iKFOnBqReHfuIM1+gX+EXLf6vfxccdJZ10kKB6KV5RlJcg27o70Mhk0zE8SK9jIsg9+IQA4SOQM725WN

rk6jUzEhh0KMcdyXZvyDovKwIp3oqCSD0WDf/7JLgyQkD+JDXBHFztWaZyBV5ij4lNLLcqqLIz1zS6/z

4BimvmToFzd7vHTNYbeu0zcvJVuey9EmK5s51fjV+T
QH9H45HkF/xCnrejU9mSLHgJYIjPXnbUhVGRWnoSv1hozrIGvLjPnXIncf+uFkW7cuyzJyScikw9iHNa

d+skSL0e8PFX2y+8yJL2nGcgJUgn/a0hdXKJoc6Z/Kogm+8yiQvzJI13t5SiHydnbsvuuy0q6wxYQcot

mvOc6U4aweQhrNYmQBqcAPVtieTH9chtAJaT8KDLJ2
gjKfNIn+CVoN0enxwFbSVPY31228s19t1Ftet971HHYd8ynw4a2p8TGPzYzRu4wvmnEQqbM8pkvkKlRh

vi12V+znJ/UHIMu7xclOuftuFLA6OK6FEkAeCAGzTsvRY6PddglA2r3vi6Fb7vcsTTdwWSUeK/oAq7Kk

Q+LCpfkQI7u4Tfj5fboG0T4v2o8Pylo4szTEhkARdB
CCTNgcLNzDhlHlczzfIVxCbfVJqKHrSEFTWAweb83HDUfJNKXJUGTwLrfd9z/8m44r0pl+bu2+P65fXd

V3VZ/vzGLkKd421kfymjw+lTQmoQ0Z33vTWSeJhM1ubTm3XGh8zgbGIeHF23GPKv+K/ji42oRj3x3Mbo

/HtgMWYZDOWrZYMt1zy1eWVj2lq56HMciJRFpM3Gsh
eH51T4P6Fj8AacRy+2xqX33mYM5MlTeZZ7NeU7ao/3TXAvhiv/psh6lBunAV0g4uNKhivoQj3wdnPPS8

B0UbMQOkyAG/Tlh381XqLcJtjQBEzoZ/um6Ynho9pu4YjIXmqLIqEMhGuzahTQsjG9RcYpFDFGEKohM/

DAF6Qm52sIQjEeZaRWhaJNODDasSiBZd2tv6sOJpa/
kxHa30cOsNHE5vw9uyrpvZDrVEl3kj1mDwOZKdEThDB/fQV2nQGHzHcFqrlmTsNVx+yBgxBcRwH3mKgm

5b+QWgT/DMOi1yiZ2c+fGSIRGGEPKR0ORs6Dwdt6buhnUXCEEF85fMnGkgVxedRNUd+WJDlqx2P8tRGe

qASdMwSHXKDCd4VG6zJkw8dD1G1dI8n1ysSfVwPg/a
fVhKzJQ3dzoA1DaRj2tn3sQ95eWPGs0tzPArvXm/tRRwL0vQJof90R0TwXOnGivWDtyUOd+nQXW8ONl1

5yprHvGn0V6oRcw5OPgJP3RfIu7DSRYMLJp3v+axUKUgtd9j+1WO31bw5M2BRSAnqHIlVNR+Otas4EcM

maJoLGwZ9WCPne7JccZQRSYkvi02DOVX9cIFtyFEWU
gMYMDlG7VrSeAG04n/JqnkfL8CXXJ1LYF0JBzziTbDJEShqF9rtStojLCjhz1CebsAeGRVDoRUmeR/SZ

cyzwDd//PXChe5Hn4dwlOlszvIulIAGih3/WgXPfI5jLBjAtIZ5LToTl0TwYYDpHQzcc6TH6C/aH2Vsw

UP4uLuc5RFKyCl02bqObMi/4gc0Bdl9g93qnfZu6B1
zspOPTmkUmIT+sr/41n84NXP6O8ekS2LklEG6/KQe4vD6bScTi+qK0GfMRjT16GfhJFdr59eqkWzNFB4

RT90xcn7NbEPb1NZZ+rpZi2lgaZvhcIgny01teVBUS1Tlv9KuFv+qtp+5zq/carlos+cDr5JqIIXgF1vVtD

rNz9Y5YHtG0hNSzVbwtGZLRFptsa9aLJMRsnUqbR4l
qdulwgD1Cqll1y5Rs3+fpaliYeRDlodtDq4QOjROyzRF55ySlD88hg85hZr1elWYVKE9KTL/myAHqnHH

HNY/5zQ2Xev88b+FU58dDPRJcMzSBRZ+o902/gQHlbE5DzcHTFt05IGAQ174uo+hlh18btKhvMY8d+Np

A0z+qG2raqXhvoJdVlu9LU/gaw04tVV6NQ1Qudonnc
6oYO305Jo5hbya62fM+jbf6Zrd1WAdposaG7xN/kdyXvCgy6WV6B1iJyzOmH0rnN92Wo2HhGJJjhDRjA

ye1lR7KNWhWyI811T4UPrkR/BTvrLIJrp3ChcQQQGoIF+53t+QuiEKtV4I1caExwo0UBc4jKpEQywDz/

44oXetw4ikYDCXsD6UGBy1esdRx5X6lghGfYs0FYVQ
XrC6W3wrtJf8pi9yb1dJvYmKHNbxOux6O0VWTk3w/IQG43AbMIWOBrysxRqu+lsLY0qeyoM2fM2kW8Zb

/9FzyaRrc/5Oj/KPKsfOhjB3gcZiRXuJBeWs+33ECGjuX76niDTV5x7zgjs2hj/hr5qID2+v8VF0Gv2p

1D8a7ggGLPanTveYiLtFw8EwJWlG+VAh6vg5GwM02Q
9RmZrWGteZiFBVYH8y0kyka3faJRRIIrxlnLyx8acnuCR6K6exzrBFTaiGnia6Uaj3shGRmwRknviW6O

5zJHjXU8mJg+Ly2EzJw/gMKdfYKgX5P24SBU2kkepw1kcjaL9aBM7DnK+DJ7EOYWng3oyycCAajQ/Fos

qwmvWI9RN6mjanB2NX2z4SRNqFRbgIPxFWQMjCUElB
INlD8jygtZqJ1as1i11RwzWpX5R4V8un4RYtHAxK7r3Z5bzd9tNYwct6P9LZt/yKb7+nXstCejyWL9qG

4D3djHoYdyyII875DeB+N5Cd2iatD9CbRiJ7y+y3NyYFzYIm6v1GT1vCkSczFGMmmtaIOEB7cVSOxjgV

8mm24/e3+o+0diIyW2ELAkAY3RdM22jJFQKF7N7g5y
zm01fg5d2eAY8LNiEaBL7XSJZQRjo9DVYNwbJPNmvci7k3tlpipeMh4wHgXf+8NoyPgFSuPbEs4T1Kix

B3mmu5cEH+R54uZKWXH0rNpc6wkkrtM1QHVK1/lV2q7s4Y28guNfbkaT0C6RzrpC5O1GNhL6p2/NsKT4

e9CWYDqvWOG3XLPT/ZZc49x/YrFDqnC/S8SNnmoVHk
MtNpXHozYyMm9YBqvIFYDZx8lPYDiAHCBL5ua9BGdYfhjEWQ8ccByHK3xyBdmcFeXYPZUDTAOb4CvxBd

/+GoptSAzpiE/51zTiCWtptXEOpGUtPsTFL395QrXp89ofkggyO6+PRM9w4rh3oNgMDde7hSmgNwvUNU

42LAyq24hbTIv3Q9HfTGkU2Gps2i8uc497xF4P+Shannan
Aj78pMMnleVyfXD5Jpipm/pmKOceIE+33v0xYImPFAZ7MSCyp08JRyjnrqGNWv1pyEyqhRN0+FC538Tj

hc/5B2/P262GAuB/C33tHiTmJSKS7JEPR35vJaawuQzkGUq5IqvJKNBKkfK9veGS0F14Gv/8IE3kuj2V

PKarNPM//+guSH4V55WWK9FZJ7fQVJsdtiYipOieC4
Lt1oE6Z4QeE5Dlb7X7XP9bu2YpvqjppzK1K6gOLkgF7CLLM2+H4HoAElFAOtLeRkw6ix/cgMLpt5Hcqj

n2RDzFwg2/rn8dN23AN3Czt9QYB6iNfhw4Q1Tlm0cxoWAC00XR+Xpilk9gUjj/RtsAs3swpi19yPCsAI

h34xPzbmsXd2TS/gTXCt5JEc0NCQromcMjDz95xt10
Iz2kyCRp9xf5tNntQfGQSPMgkJFN8xNivlX5j4rXcnUdFjw+1s9f1sT4kZwvLUM/+KZL8lH6bkfeUZXN

K0ZOvbZQ0LMnBStKfzonm2uapsSb/Vh55OZXOVsLKGLzGX5TihlDhlF7Lp2pj6WCeVp5w/DwxoaDZa2E

ZUI+JNVv/YU53h4GVhUVdQ/fHDOg6swO+oUUlzv0bP
6gj/HbqPRm+l/Y94behpNO0H1AOCg5csEjVnZr1sUKLxBHwZKHyEnilnxSA0PInGvCHhvjPEtDa6hIfx

sVnvrP7WyecGMRXpFLjZuReE50LNigCIfUCpYb/euQifYe0H3QFeszN6zYSbKRtu2f53lawb73k+UjF8

aIRZ+yaFFhKRYIi2dmMFXcW3M4XSZ7FQ9lK0E+SyPT
bwtbmzXfsjg2pLPPV0UHmc7TehOES/l0iLsuYD3+U7952y5qPKXum/RnVael6a1s9Mf24GAksK+7qBLv

493L+Uh6641VCy4GOXcgOrqGG+adb/zeHkDw89mYVyn0Bw+iIsFLpIANmNNMMj5D7p5TwlL43BCV88wi

xs+A5SQK0s2/9rxvZoUrvIQGkmsB0qydmu+Crcc1kZ
teiWHSJqJOaKnztxaS2I/zAY3mucSQBmVMeic0imV8GM51XkP/r0/rXHPY3M8NVMNufgSbV0RAqra8M3

WFiD9qzK2OfZMFB1LM5p5rW0gk7hES/lnbnH4ChFEnmzWBzRoOkjdYZfKrvoQXV/9jzyT3vBpHH5RZzm

54CZGrcBG0l3tx1iojqcsSfBjyRAcx2lswSegZFt69
ohGD0/VFAKR6Ky6A91XpZIBbTQyCCDREi59qmUlCWgxVl+35otJusvQDerdovKFdW+c0CBRzZNbiz5GA

E14jTPhJvruknFE+IqSguaA6eNZtZZ/poUvZcAOcwb9qU2N8bbIAusZEwD7SwkuONboF5I8Ef8cvNsIX

c9iXwwU1XCoh/mTDQOm3hXfcQr4IUSnVottt8pRTd1
NDJC/7Iq2Eh4uHmj93kP/DKMdGiL/MmWtyAA76GQlQ7j57Gc8904kdnVnHGm0ZjI9o8Q59yK2NyJZ6UA

oHRTXH6X/eOShKQRLG+qJewwxn2Mi7SU0EE7u9xWJurJ7kV/6lk98kXD+PRcc8d3+/A8txBYvx1gvKGz

78bmvBwuGy+aSJpbUEUGf3aqeOax5jIMzFM4HnC7pt
DkFq22c7wtU6AEyCzYSPezLVm5LZsTzqpOD3oLktBuqXgzUEBCU70+Kjrmm58X2m/xqBrAYuzREtm6ls

LIVblXnwa5f46odKfqM2xek9phYXBor7eKA1KqUemMvtFDYm2g1O/8vIyFa1jvQB6gR10WISgWis6C9J

6u4Kpw38980F6HFe6r2bfwhVjSFLC9qpEoZsPgnc2Y
AmLTbABvoRkHcKJMUlCcwEBxUxqnw0swHmfIt9SvxoGZxkS9U8YF8w9lg6dVKAB/v7un8Ht5pIi1Sz6U

kwOXrFhUdoPBxWwpydM6Ukze1Ms1+c6mpzkv7tqUtHCOF0u8vc9H/daOktN0nnigVS44A3fD6e08FU2H

QcamVFT3xyudiw015xkCUdu2o0JY4ZNc+qvpzq7CIa
0wHhBsQkmHRs7+oiw0QBcW/AknezDtED3So0JGWKOn3FN93KUxTRR5jNrHY2r0z3F+ir+DqThA9eIEia

iI/f1YgKG7KWu+bCM2D+VPNMsrzK4ZtbQug6B3nss4ATYhv995+W3H4a7h3T9+C2B9NinMOMAV0UPrYL

1KjAKFHKrE4DtM/JyMJ7jxxlFbNZ2rWkC05fLlrU84
i9A2wwhleOwhls+O7erRm6wnkrrcT532mQ46znkToPKqnIBf0FA5LuW1mPMeQEeI4I7li10cdz75ITRn

hibTMgGN6UL54ksVhjhmIVonGOZsPbr6JJBim+C7QRM8r/kU06gI7z/vJYa+2RpzxEr632KIbCwBAdwe

M5Wt1V/zkdd+IWfEfhBtz+v++LEPx/TW0+iijHF90s
UQpcUA24pMWQKhb196fYEtjqsR7NehJBbhf+PUnrl1JqyOylU6NJXmuCd1iXxM1FuQ+cKO+jzmIHKfqv

jRRbB69CMXAgaqBr2dFlVuzxT9Qyso9/z3Vrzwe9Qp5bs7vKDVgG2yaICgBl+yls58N/oS5XYe31yTMU

BoWdGNnE5RfWnUa28YU5JkmZuadzvoCeKSgWH/dFC8
DSfihu2GVUx8vHpl7AmXl2GJRgxUbqSga5ztteSTfYwd2j2KdJhO3SuW1LJ4J//27TOWVzbackBfMow2

3qzv7ZFIysldGlVLLGM3BnE/M/PLTjZL6ZNpO5Q8vqR/ovzSi5cBoILExGWDJ985Y0cQrEq7pqH2TBc8

piE72jTP2JkXiQqz5XEmLzobIfSs8RIKWjRkFSy7w4
RNRAVRHwRrzIUGEdRctGEBixbk9hNRSfvZlut160BIAEZAhBkPpxEF7VTwp5tPOOx/aOdLHhWkbRNgp1

+dwVyrpFsBHWdNZUwzivqQwaNxCgDvNQkQkVuCUv8kU9TNnT2yrWWfPNL5D8++oQxs9E7tM91O67pMDc

mP6ruUeDNigHhxV6QuH5Ep/oTB9QgdxATgmoW1xHPE
wpIYklw9353rh3JeImw9V8orxSTCOiC5vAV4m/7FAFggAz9qHQDcjqYkC/AF1Oxlltb31ys9epXeltIT

X51Uj/Z8ouCSIb/2cc1riHd6vlL46rE3fy83iFmSfiaF/a10Hrzm7gdFgEB33XviFW0mZfYzfBcfD79G

MXvcaXeXaKa/xY1X7zmcgg5z/vxuDaqTXEerLhk6bV
iG5r5MZWZtoNx1zPKN0OZ+drZsVteIFTrbfj0q08nfiDVN/huFv6BQwiuh4iaDOHcw9Id7aN5Sz1ggoF

w46ulAHGxZAlbGlTb63z4d59uyDK8/Kdt3rm7vWiN9MKXhpzUQ4nEzMuOLCTgRBI3DD26DtNzCkN3epw

y/mQ7W4TnFJjWWtJe6W09C9bJEL+ubIY6YDbewj7v4
hrk0bF0TK0I39muxbeFb4F5sw7EdwRElqWYh8k8HFCVQfFSzEq5VGdhDk2tCy7WadRndujF0DGIxpAMK

OMC49M9uQ0VzfdSzoKg/9YE4KV/Unh8gn7tb7ykeCCC08cZpbx1lkbElYDeoLFCV2V0Dx9rEn+TUiCU+

0QJ1Tg8pKhIT4Ua58yYtD/oRYq2Yr/fpDfphM/jmX5
TDYU+qpNq19QsmDpXWKmyXHu3L2aRmgp8c8/jdvt6TsZ8f2gyOeqaVdX6+4I7D4TQHQh1qcFyr26dSz7

Y0n7jAT3D75MukNIlRptbNj+ruYy8zcILwbqgKhamPvBX5KHg0hvXDLiu8gx+FiiafWxZA+mS2QOOmGT

DGpBd0SNHkmEFIiS5U28m+mA8tZ5RiUQTuWPLSc/TT
1frSo49ndvb8sPc4s8NeH7IliVrqpfPOgwLnzvJkQYDI6wasCyld5RGA5zkT7bQ/Xzyp5ZiFc5wehsZC

SxWMEHhm0aDXRyQ1L2ocN4rSqVw08ZmwjarfaFAagqSWP7P55Pp+S7v0E0uquUK6MHbdTdSeXrdyRW+q

AnDVAAe8L/dstTZxnKGXPMiFHGPjyzQg8vylTinUmA
oz7NSncb58WI9OxDHgPdDcFLuQbndlEzh0HgSout4oADMxtmDd3IMhSoeNvq9V98GFgkQGnilPsGXzJS

LGfPSAeQDAfYiFvcVaEUJdwnAraItPtw1vh0J3plRidqwhvV95bhzfoMMR0s3S6O3Rp61bLVZQ686c/V

iqv5i1QHGVv76GJPLAO601UuG4QIHQcKe7qbhpUxZ7
gr6f05WKuzCjqpfFfzoZwRxZSwOyPjZo96Ic7uM0ziorhMm6B/Ma6U3BD4n4H7OEnx/f6eiampSFmv5u

bwRT8UlxbcxUc5G3mxTdqm22iijt6hMClaRuVArAE5JKQFYdYus2L/cfHSWmhGemtaY5fU8g9Kzdbmcc

Gh24pZXSzQDSoBN7l8mBUapD4VazKlemlDd8DB4mZp
GSp2cao5GDtozCB0nDocLBSNMMHXwcHTGDYZEbcWDBdne4nP/HTZrSWrgaCEMtAohs1cQfEPChil8Qxi

ztHZf6ZhkGdd+NInfFh+sbu+TGuyQpMiEe+po29NnhGK/EDWARD/VC1vSscKtO3dq1zbBTmkJBRYlPM5pp5n

ELvpoTY7MRW/4kxG4mprXhXyt737fNwrU1Xu/LFEOv
BRpFHfE6B3+FekCRkeZkyzJw0obX2noEq04ata1kVtEi1TD3AfOH4cZnYSMVCJ9e1TiEp74FvzVV9Hfy

Ignr//AJgOWgF8Lx4UcYIpeJhasp1GHMoNo7RphsQYK2mOQap/5RXLJITMaDbGhyklw0mJURTYqqo6Qb

rQteXfWE0YUNDetvFR8J11k/L1q9pzCZ9QNn6j5yhl
HK1UEgxyhHrdu+tTAR6/o1+vPQtTIk4imp1yQ2nPHF2QUcouEPUXbckSVXz1ZHliNi0xoMd0wx7OnPIE

fGkxD/4aL7fHCXBL5BW1NQXOJoMvZ6HpJbN3Wy9BleMC6gHQ+rz9z/FDTIZiZvfQUmQMHtSW7Ra4XR2l

OoGJSKIS892gzdzKU/sJyZkkT+cgsB3D9+GZfrW62V
r4fzjNzElDYim2zEdgevHskqlxk9DsSWGBBNQSTHaO6P268HE0bYHYjhQAD1wO+xE0RWROWaQOztC34o

CAXv+oyqKiOuJfLYlC/PNRgkN+Vske+9nrG2tJHzU2YNjYATzrqWc02eP9GQEkBmztOuo/jbswlOj/x5

C0Nbip9rxW0KMkeMtvjFjbxjmstINbxkvBv/4U+8Pr
qzwT3+q9moLQNgflh2cKqzOznad/5G0JdbAIBnvLPXtiGHUFEM2iqbwBNbmfoiDhYzf3Twq1nKXWjnKP

QWTEJjo8i5lA5BGcdnmrpI4Ft3HfTur/B+G4TX8jzuMGTCd5W9QSwcGO3QA11l9N2csIe2teVinJgMlj

xAcU2iHau4JvBe91MicMujJRFWew2ezuzL1N7sk3sI
xLCIRPyPKilxgK82T7bOHKCWFgUmg+ACzbh6DbHMElmBP71/X0WIVi82RU6HuZCud+Efz63U+u2qMIpi

+qz0tLkZ3NdWuRzQmQIX9ZJM7InXu8ohT7c0DlR0oPyVRkAd+83JsH95jTde9fCfWvQajnFcR715k4Lo

pjSjZRMKD9hSMJO3/6L32e3puv5KmpFP/sotJ+Pl0D
QcnRIZ+J2DEhKBZ8OGf37pe6Yl+BD9+8ZWistEt4yjm/hJ6no+0Hh6N77t9LZqz6WdaH6zFEiZSkxLy+

VPqtnu44ezTTD23rt+WW61O/7VySj+JVuIAj7xweR2V7eQ2+bCVYZUZlNd26PutIeyWk9FgsDop+4c+B

5nUtcRon/KGr6PT70SE2y5XqnQQcVG+jT+JpvRL02J
5kMkcWlCFd09ZJwkUcZguff1wxzbavrakYx2tGA3y3BFvNZexJosurmUCLjdFB8Oyob/XgWu3FYQ1wiM

tTXA30YKx3eTTyHN+SrE5yCcaL1vxjwPnNS7Tvf8TXGE+OmZuO4YbnSnzMyaKzOw9KZ/iYlm/1FsvhMj

0G0x0RxAX5qoQzUlyLJ34+WxH94U+qhBzwEnxMiHUz
RYK93Lnb0DysHa7NfxdM6452DGiM0oxrZFm4O8VwweF5mwcO951j13l3KTz1PizRKuI+FmG267fACuzs

ogiCI5j8vVcJNCD6JJshFNaG4tZbJ02+pGvVCa6KkEssjbpA3hr96Nj4jwr5TwxeMtJXwxkYBEbT0+Nb

Z6clSL2UXM+e9KYDJoL7aHq8dQvfSC5YWFalVxicEX
h4msOTRtrp70prasnsAkf2FDHkFl9rbT/BWnYvoJN3VAoGTMTVDuiZZ5x6x4Mha7Qfwrz8x4kaW+TQzp

dZOHufXXOjx0K3j8hqNWycK3MZ4LnCf6NQ9s92VckABnD6hbFcW0EeDUrmZMZAMYBI48EByfgZUd/BIf

sXMJEA5ascB6F7cYtCw1zWjJIhS3z6BpALuYn8dYYp
KXyGrsbgnxpHwSYr55M96m+JpiQ+4hW/qIIz/IRF1lgmGxgN/RTDVAVsnl3rqB560KMPkEM7gRan+1uE

9tNNjzthmVOnlzit9o0njodSGQDciYg9DEpQHlURnk6fxQcFG+mvuCFl/KErIN1Z3Lz8Yn4ipGSt0eoK

qn+Ixw0af/zpVGKnSuTx380YAYzINXaTNc/sjBLsPC
lBjuC+gwLbAKuYUxGD0IWUuNhhN+kHhduOoJXZui4wdVbIhmB9lDtj+Sf0gZIUlSsjYMOCKsXiy+ZqnS

szfifjjSoET3UvdrThYiMb97LjYV13teqmawwPQVIlpi43mzmHvFOqeLcTR+ux9kTScSz52WHZ0MoV9a

ZTTHLpI6royPoPk3d9zU+acX76YtrnXYV7emowrOd5
+y8PzA5G/TQWzVPI0ULUtjloSkHx4COr47VTaX/b8nHXThxFEjstF9xTHawwYJacT5aIVkCRn1KteRLK

SbslIDNqbWAZ0nDCqGUJHpionk9y1hj4oRvlzvxbnnBmFK6ZSTpDh5oeleVTei12bsPn8u7x7KccazOF

8xBpqOQMsM8VocSCPbEdk4D9i7P7x6s9F2a7z48B2z
Gp+AbwM4l4xxO+2u3lYV4kppP3nAWThcWpLLhZWtAIPt7VjdcAmN4e9ufLtUXU6ECN9Wv+NY7jSt4pAY

RxsFdyvYMKyzJo1uHrOr4obLn/90zT1Pik/JMkhvHZ4XsGwpcj0vlhFgQQIzEM79tecqZdxi4YOmyoG0

CSq4rTEpQ5T/ztMCjSAbCkUFu32BWSwMf8NdHKb00g
709+w4JaZbCcGBy6Z48+oNUijXfbwhoo1TAgHWacHx35xPsU8LmzZxnufyGZJR8SUlN3TwsXzRKih/Vm

Z8yPSiYZxXbUaNt2f/PHx360jpQT7q8N/Z43zczKnK6JKVVmRt7w6WmsnvUdqM6icauGMpb0FjZhFmB5

jBTBuCCn46xR8WYIardvAutX4hBhEk3X11Ip7oX+gE
0mTgscoPT/JrFLnvMyGIEMOLbGRdWXTaKLTYh14Bgov3ooPQnLVN2/D7x9dWhrqSWxfPz/5Syl0p9DcU

5VVgAkcW7WKgY0GPZVesO/rjQNa4BLf3OsVyIYJMxjjSjcrUHKQ5uTqzna5Vh+zAPtA8tXIAEyZoknfe

9pwyT7xehMOA+uPuD5YU9y5ZIczKvB3m62Oliv8vHP
UH6eNtfqsUrRhSfo+lbVx7WD/aPKai5qslW1rs1nmAJbBN4uh/pYf8cDDW9UQToRPMBE9sqKy/kmjii0

YhyVNe7ep49HWeelDjlCq9Ad5XkjIB/RHOYqGhcP+lMLC1gqlr4iyaFJfeBU44VJpCCirUE+lH8DuFaP

X7u0pKtWmFQLfr+zZWBrNa8E37evk7nlCCM4PQN4uk
kg0X0ICBjLLrb/iEmTVixcnY4m4MJFNqynqiIX+7T5SYActEozpEvdBsN4ALEw1s5bCYi3PC7StAj7KR

hnELjwSXFuutjPYKMy+INrm1ToY4BoaT5dZ2hibyYfx5ryh6xCR/ooK35rpjauIjxCMEV7m2ItKpIbvO

pQBmYzxz2Sw1ymEmj0+LNk1NGFtgtF8noGl7KQpk8k
OGaXcPEmOtN02JCQ71yMVlF0nD34R87k3Jo8BO+vpUvC04OP1Ki4pIjRSENANyTExMHVfG1u5pxg1BKp

lWy+oBmQTj49Omx8P77rW07CtlMJ4pjJ8/mMIvRNw2UIVVG1xJXufAuoLgKzCcQ2RTn5lNIjhJqnLzZ2

vAecURq5/kxK4nL9INdfUHdZpl0XlfK35xtJQ/7nIx
UlsuhaA+W1PLPS2U1V6H3gK/vlLITnqgC3fU6ZhXt2f9Yu0oOY+Llg5YRV6h02JxUQyKQsIMuWA/GJBe

vADHuJciwEYxBlEBmmf7SMwXdh8A9mxO8A46DvvMJHxyZI2rWhmrv+avV4p42CoOr/CtnB9wj1yQVbxa

AEfbz+5zKDGXkAS34iTtstVFNpN/FVrydCOBjNX+i+
qulxQ2TMIGZTevw5L8IuLYjsiv4IEKxu7jC/XXp/Rti5yJS3HdKCRrxHRPABoRacLUGarPcMqidw1P/9

f4nLy8Ti/MZ1QpjZzT1OTfdMcYK27/kkLkrmHm1Z7TNhSJFjczCmEULuoZTJ+qmdlrBQ9ZtuDbGmRbXd

QgSj3wFZesYreC2/dDemLArU0+YbufOZ1LxpdqGgtb
FW+tzEYTL9lNHQbZCUbYvAhU6QmZ22GAxWtgIFZMdPgbl7kOQsBdWAdY06eDQvet/N6Ql+1VSLOEWY5u

GEgsjztrcjGUC0ysUlaR8h4VWetXBK247zpejhRi1BCZcl88G20zQfIzDqbaYDuCmfvlzgGbXic0V6dv

lVW0H7SLGrFV2+YrfGZVJNXd85xk0niiW+r/aCQ25r
+VD8HA/EzRJXRAp4F9D25lZzgrGLxAhFQsqPwUH1tpYUdUrjVhxp1A0hizOFQQ0Xjykm+jevUrUorAlL

X4Db3Ls7OiCOUN2nK4ubr9vgE1UuI10t6w9Y3gHyBjeiN4HNvrDUcMgQUnsIXbJcHipXOD+ZapQbMuKJ

kYF0nfS2ivZ1540d1a+iMMYKfk1oBilWsJVpAWC8xv
x5jMP8ZUavzbHj7yKv0QMm5mDSmqyad+txvU8gJQdBtuJtO916KW46yxFv1zC8pwusM5CKZ1+wh9NeI4

Jxx9937uKROm/MknL7g6+XPTVS0oSe6gWooldg0M0mEP67FlmxmjZMwIGA6N67NA0Zez6zUuj0k/qhcn

+qGakeeaQ72TMA7rJLonFt4gkVRaSTzP4gJUbiZX+P
EEMEcsIabV4UC1snaPZYJcOjN1P5j+uy9Bc3LgJzIcxih+otAeIDjLWqsBaKGJr+KifRTBjrilMh/hd/

0WSFWbP3VfsC2xzdaCK5MZecC+4OwfwCrpdnIALiXZiEkKoheWz1L3OLGN2cojf/+ahsRfhjHznHt+mJ

LC66IU/w/A1npel+1+P0y0Z2MS4AStp2YyBWETCuEX
vv14wToKkVXf0KmkumUNwKv8dqDnzrMXKWF0SXOYUnp/j/cAqbdwVKjHToVr3A0GHhlQwU5TavvvFIMj

4aN9qLwBBU6W+Eyqzsrb9dtYj3qXpdThG79NOkEny/ye91K/XR7ViC+xV80OntGbU4r/SzYCzVb0dbpr

3nOMxgVR4bZ/7DLBazTg5DHqb+D92kAo8DCjZXR3wl
K/etrg7wanbgXsHyk0zeAXoke7mmjMocxyrY5fLZ/y/z/QsgNpjlPRgg5kiZPlGKgTUMUA2FwSCzs9J2

/AzI1OS9zTVh/m7FywmdCUUlMlSm7nKjgWbvAvTqXNA/cPxvm/+x3YDWrM6VyHn+wS7pBZkoXhfdRoW7

6oErPNCmEH7A//vOzWehuq31CuByVLg/G1XkbsU5z7
NufFCNQLuulM4SDqoLWsE3/sIWF+zwEJOTf91EOp5NXHVPINCzOnChdiW+rLwNzFIzQ5u7rvQYW3tIY+

FH021L65k5BH94h6MbpGnzWlgAnXtnJ8JG3uBz0fWlKktk+TYhopajH25JFqTarwXBYfpi9QQgbOidu4

GQ0rT+yTnAMoa8p+iI7H0oYxSz83Tcd4Nz9MDyBwkj
koLMmrrHSfPSq0sQmRZU0cfY58NQvjICZCmT3JiJWTfCuZUmtWJC5knx2OQvpakrTrusrNvYLygI1rfs

OnD9hyqINput0axlIxhixxSmm/cPv+ZNolOy3j2/5Spthm9Z0ZfLcBynQSuDZhbKkxCOklalybyT3QiT

963phEuVbQ5xpgS4OWITknUM0UAlJ3yvsOkQNTkW/U
N6XHw/bx0tYDMxmOCY6RZU5O2JhZde0FggFJQwJH9yb3XdQdBd2V1PIOeAMb84U6TnYGO6TPL4FodB6c

W4bgR1z/hIG1YQsA1MFo5wDpQ/SahV0ekgtyYrY7jbuDZEc2njGFbTK5RnB1WvTG2g93ElFiwAdZRsnT

cpnXG6epPMPzqS9Uec8Blt9qP6I+x0mrcqJqxXHCa7
l1vEduzlsMqzpV0jKSjDjZkYFmIpbaPalq57HwuG8UxQZDb4Q3vB2IeX21Eo4LEKST2O45c0YZ+W8l34

aceT+Vfb/bdvj+f+4Yl0Z4Y0kBeTagvdJ+pxeNTB9vseV9qT5t6VFfInOa5Kq6zFK5h5OTM6XY37fQ/G

9Qnilk8or5P3sH9GB8DAlF4KAA/SSaV5WmMa9hqcaF
plUnm40NDEAEUFKNky0/pK7PsglmZU/yfbu/doKuu4H/xnoC+dYnbI2cVG99k74syohR3U1CtAe9Bvsu

umj5KgzFeGhmvR7VEczV6TKgPs3xFaeeriTK75tS0Qwc8jFSw4zSoC5tIwXQJrawekVNQHtkDKlGc/ez

aC4nvHdEtBu1dxtdWoHXdzA0fA42FxoIQ18nofgF9f
jc5dGhL4pwRGZVu+im5M4mGCGUUaBjbFmNPYVGTaIplV7F36MiEg8ESjLFq1exO1EJ0NycM81GiWHE8v

IPANty3EhE0ucbWMvj87hLX+LES1J/Ehjw/yC0US3roC6Xa0veBUL1X0hdtYKzs3R7EqdCxb9ux7sp1b

r3nGCzVtWI4aXe1H8puFfVQ5nFY3LjGL4u0h40L47z
siswVkxFWY1MeUOTj8whOZ5LGO5plA+lr+2bRWV6H18E8MK/4AVLNlBgN4X1uTWXg3PhpQ8ayGg2v9Zd

v38Cg4OnlocDANmKh3HSqtlS1+jkhiJnj6LAmOkUZXq8e5zKbFuq19nxQljXoEjblPdyB828dK2aUZkt

owZsE9EjccOA3ZFS0piOKQytoB334a473lXdNHA2oM
9fu7A9uURC4bqe3yIQmBN/LmHn972tCcb8Cs8i2S9qriIXvXxzq8WKfe3ZuebhJFJcZDcWqF8vbiX77K

0H9WaHdrj7Sd68WNdaCKs8jBvAydyYiTlAaHsFajsAHw63mo3wD6fa7K2PNC4ptghGO3SdulMw68GCqS

9b2t6664sdvM1wX7M1k2E9N97VvElaaLf83WzdeSTe
6ACkIb3T85QNWXosa0H3olqyXueaaAXBUTYwBQf+DCzrXM1R2znY/AQw1MjQ939NK7eAas373Tjv0kMs

Dq5/m59Th8SnfLWQtvdbclHtzYxjV2g39ctrCcQ3jJ/VJycHFj8/KdXdkIOETIGPKdovXyzG2wrECWqV

9wFetf685WOiQ2nTvW4mipR0G14cTLEvo2lWrQbHKW
ozTlnq1l0/X6OevMUeoIO1T0/uIMAQ41o3DALAU+HddSoF0pkbcZ/ZQfiX+WG8GctGmpa2L5trrQDD2w

cjzNvuXdAq1WnS8IQSDnMixqBrsinmM/YWQwphKGQgGAI2W4dvbOhmbJkAErAxgK44JHdVnBxU/ufPOW

qZw+q2DHU6iQlYggua4pqtwVS0CBgMbOsoWnNhtuu7
CLLH6SNf378bebLab9iCm8HngVRKpQbuYTa7RQACwej7F2V0NmFhOUnRY5IKVZq8rhDggdugjuh3SyKq

B6sjF9ds7sLPhODqoC4wptZnz+UaqGferXo1miVdTzFZVerfq53M4cYEO9uzgN+ZMUffds+bgBKd0OtF

/Nn7zCg21e8XMl89/CslRS+7HoSSkc0AeVL5yCw52U
R/YtsApmnWGLO51r2xT1G0vkTDzE3+Q+psUHwKraV50Sfo3xEdltI9CCRoPej7upSlTmcUMfAhTQ45LC

EHL1+yooGy8XovUpu2W7tcFK+9l1p8cnNURziELhOxaUGpom7JDo2duyOeA1z7pWBQMuPEopEifK7dX6

lrmH29P0Y7UFqsKyHTZCSmpHmBwaf8trQvStembiVx
wxH8QXXkBv6VLvDtl+KLZxVM/tR3RBEue3g2boVb8aeI2JzeX27L4Xos1Uv7JMBFgMwLV4hmkolsZY1p

k7UqET4vxhCocQOgzjZpbKJtknG9ZW1VWMgbks5a1CSJ9OEKTeG979ixTer55+buzIr2mVVtddlAK+mp

fg16nC5/MIzxEXPCc5E2wb6fHwGC06apbRLeLLLV8O
a0Fxj0sjCDUlH9fbc8mnnIVMGgQxsk+EavnMy8JpdALd7B6phq1zn/h68G+5fy5tEOVki72hWL/+UrL3

ZuiN9TTFSgdodVzXtId3Ieilvlboad7wGPI6+mV8+e6kz+HihTS8opQi3lQ9e7Iz1nOPcpPjLw5GVE60

USjV7s+AuRDKstrRvmozSKK3ftE0gn6LSG2WAS0CyE
5m7mYjxzxbF1+M7ppsgfrhwwB+O8naHhsj7zqLKWpfKM4saHN9bgK/7sTqstpz5FzL0u1JZwNOv3OC69

Kycejd0Tp1dZh02blfR1WD5mar1rpzC1955KjWIFGJd/2ZWDNGayV4b1piWL2Uiym0JudUGMtFY22RpL

Fop0AF+q1l1p0YaoUFar+nMkzL0hA9OhcDNpNB7S0S
+dMayG9B+Jo0zgiYwidZ4tuXRGC6EXE0CSO2GVd7ksFzUNdgT9jirtnIbY3hnRJnWeJ6ZbtVy670RMPJ

rk55exrkmQBWJfgpNqL3ok7Nqzic55rbbNwPp8Vb3285VfwesI9wGCIOoRoGU88gLLUeroG46xKZ6wR5

u/6uI5vnUIebJnj6Qet/B/NBMgcYAsODRFFzzLJSny
AHPL9H80hoqeByLYgTfXsPpfznb9dCYECPDwpMBEd/1L4Eo+zeFD0Twt53FUvhD+ylPhtnVOl/2RMu9G

Mpaz+fJJgZEqLUsQhv+CoWQejB7jAr98iJvHQ52w2sz3shaZHiLiV8iOKOkNHfwssIBbqWnr9V0XuSNU

lTxLYD0h3FvE8YeyA5t99Zmg9atBy8061o4cKwd06K
szv+DUIAMFaWkQkIqs22YGQ056wBIKMk3pAcheO7IsaTccbxTpJTAR5/vtv2/mjR4TICPKOBrTUPhJHQ

gNb5VFflQ7iyQ1m7FvHJi3C7EP7sgRPL/Mtnkbg7+EJaJxHhDjJfe5Kuej+v04RGFPkpa8IxXkkmGRQb

OORi2wo/hnIc+UyPadiGeJhOq0kLBYy/+8TAQiDzBg
yYZvd514Py6JcZ7pE3aYsasyCMzoqacsO6TcO3SboDEeUUyha+xOx8+XRbpuTTosCZMaFN6G25Y/6X7C

xR11qp/bdInFK0Y+52GaTxWXldhApEW47CIcA9c5MXlQ09/YuNEPJhJ+uZx4d43x1fAFF8tcG5+gTiXA

4ZO409iErv3VZAKWFMDEIjEp6YGaaQ1zzJPkVu0R2z
4wbk8RiBnoNg5XhgytJMSSlnG4+4MuOMP7JmDP2ms42cvf2e/RScWk40nevbmtP5JWZg5r41R8nVhxWX

rjXYvOolblceh8BaIZeRTqIFtfcEVlvxeg3MQpUUhEJ4Ppa91RB47FZiDvO0jOHe4PuGS+R9wsLAfiud

yoPzFDYznAlUKAjgUoEhVpns4WJAFVTYTBI/G0MQO3
uIyxRussQsGntGSzmHPY47Qa09SqgT14tjhkESJu/Z2+whRx3mu5TMkOs4WamOrT1xmT+2atxdnzSjq7

TAZvDcQLy8PLRgOErEv0ivyNYP3xwQYpJpT2Bf4G+Gb0xyVroNoMCJa0NEjfFFb5/rinZAcANOrZsdGO

tojqeZANEwOnpKBtfz05AATLGo1b+jWo71kJ5xfUU2
80AOms4s/Z5ShMcQ3KrCfIv4j4vnh5U/PvRWt6i1zgHLDZM/d7LQKxcZaMpXbvCiQgXvpiivu/aubb6x

87k9D9+Tf43vzbJFS6h+Rtlrjp0wzjYXhLmVZXAVpxXErH2tblU224kGmq6Yizgz4C2aQMJREWOe7C5m

oyLNZp8kTqTCQ3ebPjCLk6R7d+jiVVkSqbxYX//Kbm
SLobsMMm0+o3NgxEEOUMwQRTgnkfxolWoquQn+eeVqmPRHLuEh/bLFLgIIy/s8J9BtCqEDwLu+pwllVH

npZ3J9cLLHNmzfMyIKuzB8/yDcSXjV6L++IVRIaoeGb6ex9GsLe3YIEsMKkEoqeJpyF4fMZ0F4XM8iwz

0nq7VHaBn9vDpOfEB8wbUNe4w740cOr29BtwUyOWMh
TGYrgKP1C2A/zTn0+V4AbF7j4f+xChXJCkYB81Pp/x+v46oU/SgH6BKqKYCFDsFczvW6es15232xJzxH

AqBG1M7aWtHSabXrG5NCLP72C/JEXJXC/AxUk3NwIsdpjMZd7haZE/gh0FyRUNW/x+xVv0skx1sQ/CoN

12/mRGVdej/rCu1OP+nMLCvlnz40ENJsllg49xgy4H
iZCR8r4RJw61YhuVWbAg4+OTVdyqc+/19Nue43HT921gGpXAd9dv0SUke0M99vBK5K/szCSI4Im5w5uq

inj2ZquQfKyu974hhs8dqa0/Ld4b7MnH61lJue+YNyGMfxWoaaSkayzla8kyM+I/WiY23/swv+XbSLbu

ZDw0/aKFxOUb61WPSEqse6n9lTOf7M5FfkWPC8rFAE
lQXz9BmAUQwgaSsVSp4azds1MA/0iBMRSTfquGA7d/qX1BfBp0zxBIIaC5DkkZzoMN6ONP34FfshmI1A

sS2bwQfR4mj7G323/3Hjb7MM2bRlAuyq+MyJdCk6q4mgsQtLzElDSX/Qg6jUhdjHZPKqCzDanmr+A1OM

wOdAcMhGN/NsiUoDjYDryupC4A+RwYa2ep817mN+Pc
VvfrSw4Y1hjZVyFpUBgjiZ3o5posryRDgLiSWHDwJGcD4iOi6jMFv8mHLATRomXm/3/nWIlRv0hXqJ2w

iyvdxV0OrpGsSntDaTofpxoKHffe11+Fjr6R29qH+8x6S/RLl2sga35v5ZjcKhV6+2ippkUz34Q2k3jB

23lzpWcTthckOSkvAno3EqAP4qRFkVL8Qxt/CkBQjE
1IPMrgYrxVx0wp01foc+Ft2mJ9MkRRErXW9lycbKGZxYGEcfS4naJQeXw5TcPcfw+8rs8p/unhNzSUKV

EUX1a2sRXi+adrv4R4gk/cykLvC5v1aT3OBoqMgdO2gCZd2hMVI2vSb63y64R78TmN45E25tuLQWmJ6U

27Hh1IhU6piG2Bg7zh7HT9+hVD/wLCsvLZHsq1HOKe
YMsMxswigyQuR7NdsyuQiz6WE0etys2+1f9OlFxQOKVGKYa1zcuf/oLNnp8P7ZUqSVdK/u8bUsiQfTcm

iv/wUCJAeZuAvuesFwn4UMtzrYDRhpgAolzQeYdJR1prNvotaTjQwo3CXN4xXvUMFNbcPc3iDyG1fP7m

6nMK+F6ZnI1eCnkBwajoJEqZKOqYYPGTfoUY3cba0u
+jBRqb49rexTAcEdExnX6szzmqNYIxWsCmSmWF3u1a2vu6YxseK1xE+rs63ZBBJ3eQjaR+aDpx5gCZF2

fSXzuqAj0PQQQJw5laL6vUSltNb3fuLfeXVjsrwpnJQOj3LNYZmjX86D/rJF38TW7Z3f6VBquooMKCHM

4A/3TMk4j6dDmxUGky4087+rcRYwxFyt84QPqcZGeC
k/ZhFq5v5gI3Ma2Y/8bH26gSdmUl7D7TyuMd3o8jNlCnuWbvrXwaSfJYGPTPG+Voqf/HJwhrgAhK6cD8

XRBudUGeTx1iTdYqNftFgDqqFxkgeoCBPSi370S+iZUI5wZxbw/BexrJlYM23CURzH+T+n4h1u5cT8ut

H+V5KUbTWHlsG98q2KnFcggObMh8SyGWzgLh295K9D
JpJ6FJYG9legA9jcTNnONSUonAfucUoY972zBUD3c/feVTRaCLqsx5mGw1dAQ33NjZodhGLY9+QYAHOF

9Ucik8/fFBdYYDYHkBkaJzc4yjl1ZUxaS5f/XTqXJmKbtUco5ktWmwDyRHl+UTRqLVq8wsqFQpHzAXDi

Mhc+mWrldd/RRcqfoPOf6secvxFBMya7wl8kpXoiAS
njS+4loegTjyPlVWifJzDb2gQZmpy/cQyodNLVP9wHXpPS/ktxyLxI8RgMx9Xc2ud72vQ4joyUbKI+t1

fjwGPGBW6CXo7MKt//mzpY61ogHLhPQPlU2nzotpjK7f42pxjgrwmOLjzK9lrFfrR4gPt12iuFVuZmOG

ZxBB13vT8EqP0hbWw44x9C0wNRIfHa0j7tV52hVv0q
bbj0vwZzmBHrnvYrt1osd+VOm7yAayChNBm5Rb87jEnvp0Kiu3+lW1c+Xrp7Q1vWjriO1Y6oVZzjgjsc

BPxueG5Hvs7M7cMiU/aDi4EWho/cBw8zY3B3op/pHjntCjzD1EOJk0Wx1jAyvDHzbj8elkmdTpJvvGw3

EOwz/NAQl7o6JJNLaCSxFwZiRyDjOPIN7AOd2ejfPx
MlzJt7O95zEszf7FeQytD2KdUFulfzLdwlUioMR7aMdfLY4anWSk650fBdHKdznqrZboYCrVD3A5zk2w

Kdz6dpCnkQ5xh6RV6D76jn6BiUfBV/vzHzu4P5MtIxigaIc08Yc1UzPSJodOrb7sYMTA+Hx0L0+yUAue

DRqMHiiszrA0jBBdRTEXpoL/+MLJpGryrzIfqhEwXX
N8Ukd2zGC7Z1s8hoNGvobUUMNk4BxCQujYOsv59fKrph0IU7l/qJngc1oZcUmms4bn9L01HqoKj6xayW

YsjTGNAyQS9NvBgTvJCB07Z3UZhi9Ai8/jW9S9Ub56ocTphag1GIBU46uQGuMPD+0aZU/Ytb4MuxEXaH

5XqFmUcqi3zJHaqJgFwFvcWQPiROxrIsBcZFwb27Tm
zDLLDv4LhAqjqdJLe4vjtDC4kWmVeCoiaioHyqo972Fii38Dsf3Go63a0UWFWQREGUJLgp4djU9YIbuI

OiQPWGcZ7M9zC9dwjrLnKW8X8KAF5rRQaaHpa0U2aR/T6j29w3m/5pqtK2y9f5ypkB/FJxaiwWT82QOj

a5MsY3mux5VpNQHfg9kmYtgCvh3adXAwrR0gUJUnvg
MtW+olM4SJMs3x9cxED3sW21mPKPngekDuu5kKdpdAgtRSoOQgVSsWqQb9q9nB1tsLZ6+qA7Wt1hGJlk

bTEo8CwpsuzKuSXxbTWRcc3Tvr7lvMqPup1sf1r3VsymX1FjbFlqPo1yx+mqVX6o281a/GDdWdMa+xuh

5cYidFuGk9xR+y1vIRjomrWKwHse6+bd82/Msa9mL7
OYdAmVlF+/sIFyZHCZsHuTVEc/TmkHQRojRhKlGnZIt287t322qRirSyqfsUELnsIX9lbboaQIudqQ3v

9Si5Qipfi5s7lxztx2uax4MQn/Z/YLlZIkJmA3nHcZXcPm+o+qm95zyJ2w5gkubYm4nLuEfzqnq5dI6H

oBh9VT2Y1kNlM7HphhrgpATwuZE1o7NvuHO0X76Vmk
ykGaUUqKBkEKGVB1SsCexeaQ1RHY7sphptOcdi62s8bv7OtUXJlGqui1XGPC7TAFWWlUXRIYVKfZdT6K

/HbyYjTmfoCPKciiRgxZXJCDhy+14a70avi1gEOAfufpgKQU3/+KczVeIQz/5X6dv8GpgcKoZ3Nq94Lr

u5t7EdpjEXSVdvxFkvHjWWOalEkvJ0d9MBvpeI/lIX
+CBVrAAyPgK1yWeJxfB0yltXlb9exYpb8SbKHgkMdqiWRNVNY0qj+VuCAbq6GlercucWSBUeNIcgSTiL

giA5dkCJYO9LQPiFP35u9X271WwSRGLHWIo8kJwzhkr/LyZfA0kzVh9ch73c8K9DtX35a47/yKMuhpPd

ETPSnQfsKT8kjTA48wkhUQuWWjiehSh06yr0Xg9a5F
vn44+F85kI7kB4ikUtEqy0qayhKv9AbGGI2Lh2mE3HIeCRm7FHnUwSBAjhlj3/M3Z6LkL0lDL3A94Mgi

eWjb8o5Hap9IyTR/YnSijayPUOd97bVV7OWSUbp3qFBNEiyqK44tg4ezUP567czqNnk3fIDQXfd038rw

MgWzWowxd3KtuVWsOb79Emabfythq7ddoqadQjMJVA
taVcAif8zudvDJqlKizz20XvZdV104mWwCoDq+P79zAT0aYzmnka9HKPsr8PCLCNvf0KFWE6fDSHdo3j

krt9awxPKQkL45Hw2CnTyL0dG1KST15XVHge/Hq2kXg9cAg+kdT9VfxHPsdbWQ88Llj7lMsIRpa9AyJR

tLZJVuUFEKEMfpq63Uc1T7lmgwGg35DS4XtPs8/lR/
camolq5m3DYITFzTd4dBWG133Op7iVYr/hL90cPBfmMzI7u1Tui/7Sgd2ti8DlHPLVmkyS99KMR67YCj

ldWQFPlLuIL8FG2DmCoQ94JJgz1dW+3WebIy/TwHMNtZhSEIOfqzncWky1CRP2gR92rWJE6b9Iynca3b

miwCBbcrRlw/pfQQ/hyfyAIOPtRsx5D+4yxXvP+SlM
vmPeAhytlGuxlDls/50yWOYULW/oywQLbnPyf5/X4eEHeegOsX54clrq4GmhhhNm5VrPL3dGEXP0AaGC

EuhHAsQlzAYvitkTvYQ8TMEjSQT0vZek1ClzT86uAYSu2mNrRLZt1GNOL9lbqJ2ocu657iY/aWHh/dic

Jucn4ax5v+V1jtNws/9wdo1NAzCmL0vyykQ+N4Hq6n
oa6EYPz3Wv8lz4fh3BdiqRuborJOBSKvvigVv4KraYGk66Xq+vZk8mmC/7+ngxl+sfP+h6nZV/uvCoxq

6lCeU3h5pxOepHC5XsFMEENkPBCNwVvUaK0c++IrV0UEV2vaoDA0Pd9NPSnUZuyVr1ppsjQ9TvNCgfhb

/MRtGW5VWxAJ92WU4JtuyQWrdqRj6pFpGMZ5cJQ3sY
3arL+4uoAwqPK6qmnbQWRQiOWwfwtkpQV0PgiEYjc/Ts/hF2CQLBjLl6vwdWOkyTA5FRezgw/pRV+1Kt

X+yKeOiywOyG8bYI5IMAd1O+9k+N7a5Is8sftfnR0h5m2cc5jg9zGPHK/porZM10pGdII2NWWIM+94vZ

IJ9KCd1+kuvf71m0GLroO+cz/7V/SSS4ngmyNKnf/5
OIiiV62ix3O251FqBw/g3YHVspLszbgx3qkEaIlPzOm43P/WeAeCcwRP14+fxXo7E9m7CZkXd2GfeXRy

uIsPgmOY2QQMeTHnoCBJMU6IvZoME9FDgNWU2vNy97t0A+f+ok9tpg5aI23yo/n3t3q7qJEFZoydP1Ap

WCkYaSaZ/Gs798242nPl+AhmfaHIkgh4Z1dQZl4AWS
1+jLlLpFEjAytQdzCoNRo+wnq3Bgf13bYa01Hi+K3Cq2EnkKzyYB07dKE4/XA4WwM/jwfDw265536ULj

srtOFmxUpSfUmsbwfcCua8nwrGUzFkWXHHxMcBLdJUoa5iZIKVuclZk2Zo1yjwpXw7aRwqk8H0v2II1E

9bJkkPvJpvHhP9jV0AmzK6weD8CG67YpjvbqV9w3Kj
AnRcwW6jdyTk1aMrNVj3uHefvHZgcL5Ohg7g6/uk/umtM7OGb8AzIlipDKUmz8FdbE6qXJXy9jU4vgU0

adxbnskiDndbkfBx+8MOejxZzjhLwzBGcxVVIFMaqzskLxbl8JByp04fQ/KP+UcRW2shMfds3dTu50T1

9WVRPBw2pEEcT+xYKfnBBlK6zD4PznAWon3ldWCI2N
gejOgHmk2I6qUEAJnc869ABJOiBoX/UB0gGILeYtzFBQpft5IOEggQXDd+qjWIy4ge4B1Ja8vzSpzPhy

YlSF6kTWWsj/ha6UVUJwjOvkK/lf5GQu572LmCA8ziPvtcyUkWyOW15V3ODfUhhpTkPk49/uMT54Ltqd

QxKDXhZmk9ggs7JrPvFu4d2edm83kSNqOOilcd55no
ddv0QPZOkzF8/828WEZCSslT2tZufgF319MkB8qo3cFmPmZmF239Xx3Nsm9ZY4xuuq9cWwypYSkdfcK4

T/OC/BlLYJ7V8bVPa7J14LKz9ORRV8U5/AL3m2W47p1p0ZmB0XPhjOMZtC/MtKvMLxbRVojnR7dQlzRh

3z6cbczM9VcSxKGcXdPRAGDpATgiXTc9sh1O2wgQEM
NOG/kqV5m4Sm4BLD8SysPEEyiJii3rZaz2fB6pTWCRjHA2+lxi6NhQuJgDwQfH9lDjHA5LuyCYyhqStc

/Zjq+1N01sjGS1Edu/6A1kr9BuWm/+GaHAhAMfxLGFqyMoEiP2iHTl39C9MumsBBWuHBkU5tcS/T+P2v

dD7qfxVgwu7A861qMiFntFVQFi0wjp9Nkjv1AUJXIN
PDRIU8rOWYNz0lhsthn846W0s4Gglh0Tfm04LJr2lDVRgDjomawGLSr2ge9IKStZZPgaTHZkum5I9N1k

MfSYB0lSNduX59psllZ+h3rAhAZQmJSnxdco0tdsWsPbSOGLLqc/zDybiKBLR6VLnftXFb5b35qjXhqN

uVx7dqduxNigGHXS/tLydZxjV769qyDJ6Vfr4eLk9/
O+Ubb2+hrTxizD+gob0WnQqSpyo01cjSar3aclhGtDjt6fc/Sgliee4X5NcUoUxx2OpZXCuIzfyjS0j3

8TRQt6KuG4+npMgpo9LAe//4NGt7VpFYrlqrr/cjtK7LLrevfuTS49vY4/AQEYjAwQYR1IO8M9v5IHr5

kBNXcKGcx+hb3aT9ahJ3yaRJUqeZcJmI6qMueBToQD
l3T9cC36bMeCD91ZOxl/nGtQZMG1PvfMN02nZo6BilZQGq90uwfXibrqbwG4HNFAkzZt5HYNBabu/

IRZZGEanLK9AO52xW2gGUbMO0vr5xhIcIvltSh0hfJ3hsgb3cwLi+tBN5TwHvGYHnIvRtG/UtFI0wlSy

jBKT4CZ68gI2aZBhKFVo6my5gv/8543xn9Ccpv+K3t
oQ/xBOtQLCdpJqsQm1Jlf5jiq6tBMLIR2TNurL+mHjLO71gRFmUEuAyeS0ZLj5XIcbZ0X7oYAxFUaBfU

94LzfKGn/cR9LTT6c1BKEYjoEdNF+f36rI84Er1uSl4ncWaQJbr6oIYasjOfrpHV3frbS5hiC0fEvcmL

WOrYBn3Rv4a6GBUM6J/k0S28GQDUrW3Z2GP4sXsPEt
+p3ES4YRfpUuIKBULhLZ3g3Nf5uMes43WC0FGOR5Cu+DQX5dttyzGqWdD5oAZBIf5W5CjPgXEKe1XPd8

aTKAmUB0y930ZQ0k5frLJHJ+0egak/iRR5CVh2fzaOF5k5Al2v4vmp3JkafBfSJjEmhZ4QO3irTlO5mp

Cag7HSyV45L/P1y03h2HrSWx+SnUTA2Ro+vTasnn6P
qxsW1lGSo7CYgPa9XcugzMtR9N1hVbxjDelECgPYjXHilsFxzmiehVrnCuv0RQ0RsTnrmE5xs7QBfouv

c30voeLhTXq1GzPoH+ardOXjGvCDCwwNyb9CMJfJGI8e9JBwvYEKWqnoA4aE5so2xsfgmibbjYHduByE

WvIk36J28vUzy3a2vM1122xbKlyvVFnWzyZ+S7/bJm
CAzEvQ5yv7fKPR8TtnbU5O79BAfc3lmWW//s6zhQFjnTG+/WNTLsNC0gPkM2mLezxLWBYWX2DueNnbd/

aVTjxuNcRrf0iI1BkHiMVtensdS239H8ct1l9WCEV5aaT9KPlUCMZhvafcV1d1pN/2n4GwXbB5LhHEYh

9xP3P7/9mTNvUdee/btOTnYQOGrwMmp83RHLWTp+zp
jspH2cvzPjtFzRRpv9U1GyVTjb7fAYWPRkPhXv+Z6+Qaocu1YEhu6fT/QGmvLfBw+WIZ2fRNrz6bTvsG

Fx5pGotl9l/AzbtxPnZAYN3LkkHSLEmNQ4iUwDTtP+ep6TYrig3Xy2kx4e9edMCCY/fLlOBBi6UQwKPs

XnT8VrJ/SGXN50b3Tw9bVtSgSZqLqpUqW8NQHAEIRg
yQmKt6JFFrlbGgvrJ/XHGZ1n84KGDCraFvNit9nQMWfHklfmWftc7uJAsVGm3Af+I5hG0/yfLkgzZSTx

5/Ygmz6pviAdFCLUgnSNoBeM54XiGNHhDFCDsx16GCENxWjGnAmufXRHwE/Bo7FrFRpIaNU2XeI21mGq

+iptj+HxS5Oc+6722nlfIeiorNHZWY13KAcNNETjdE
YC0qC3EovSoNGq5cIsP5QXZ0wAmR+J3IIM7bNWEBLvupR8xbpm6wRkqaJES6k6p3qzdmnK2NZk5/Nx3k

kgRMtCA6zfUM6rpzbXp6d45yU2nCZBj4ei9eCme095nKoghhnaU4Bs9aZc5FNWi4eGU57IL7KcYYHPHS

2kagIychL5LvDeDqcyEevJmtlfa/u8CtClmm5zb0Sd
Cs/3ICW2Hg8LVdtSCUuaIs3esTcZfsJ6am90Oez2i2JqcoeK/jBO4J1gFBf08oD/tLry4W3uljHGVI+d

kYQCZ/MYIhTnn5dt7d7cSXSjlgTXug+O8Q6ZGFYbSwcimVa16CDeDNZJhUWskYBcKzKESG7/bBPbUXfX

vsVsOVn2MpDeTDzcZgNMneYwQttcDO0+RPSsFZpKpK
pL89uECcgvIUNShJXWdG7tp76L/RWf6DuHz8VElB57bmz8EhxKD3hjkM6U0g5PAR5f9EqQTYMNB3CNsc

6gTqMq3z5NduzvQSKD5YqkubAgCrlyzqWXwTRqUyD4JhsCwdWgZrmNpiTmLJ9S+9+/k5gITg7OI34to0

Qpg8nD0muUl3Dcq1gsS3yvsOEDmb/nVRydUsVWBSwQ
0Faf2Psv2+u+CDF+iz8wg9Yh9PhlNXkSv2Z7H7zjNwlk8XHRLEE5ZAkvKL/E/wPGV2sHx6kzW9w2yK4+

d7HP+4HaxhgoTIcFlwzmdu4VVdZS9/88pER9SfFr7IZ8/h3AafqbmN+ltkOTDdRC8Zg24fKhFnMCXsK3

v7qlLap7hSdISbnU+X+sMZobhCx0m5aegv88aD2waA
zovwGUavS+gb/PGv7eMSWkRfFCAvDYdHBh9LR7c8FxXkcLd7QueAj4w9NOQ9+f3Jmjtkl5oPXz6N9YKk

q3k5knorz8+3j9bSM/PijPMsIKy9Hax/RChglD+onB1bW1NbwNJLPoPgAvi7nEmBfhvAVZnxLw5svNTM

JrauW+VqdXfnxQ66g+qt5Z+acsgtyeesFAymc8ZyOh
yAygPzceHhzeB7qSiv8qm6I4FsGz1FImP2GzsSokDgAo2ReKdGJedLcLi5BlBy6znu9LhKq015mzTvtu

oMeQnx/3lwA5dpuaRlJGb7lkdAPerZTBCmpXfsBB2PlamGlSN8LTPArMMg6U2BIT31eOxcgX7Zi9J51f

VL0Oe5uIwwMNllCY5jyK8xu8KA723oyst4JteFe9k6
UJqp6x6i3e7pGYqdG3KMYq6UbmWnY4eu/GpwQWbRPNmaqtab6u5k6CfM4z3Kia4G0zG1IJWJ8Uk0arUP

ylfp+wvhzu6VpcSrk1jjDkC6dE/TI3W2BgJkavI6ccjzqv5JNSzLe1Hhu9sxs76uGeKjXADbx/bsJzdo

3weQYrGL9F4naJtfHNi4BGI0tOZslZFsbXDolmoigQ
lGJNsR++sckXBLvqT0SuI2WI7eBId4Sa+ySrAZSGcDdK3IZ/MBvRkw/CV64X5q7X1jM0As8em8nS4Xnz

y7PzDxO/yMAbjr1SWxuQHVi5F2qi89uZfNm+I0QtcdAI3bSfi2e5XaMjlc80EpXJBJRgyWQAvVJxAodc

kDud8ezoIG63ibIRX4OSJC7ox26z3qMTCJeCgv5377
bia9/d6ULBNN+yem77Hvm5JXZ4tBJGc5o2dFimaev5YeZKpk+nLWl6DkMAez5IoxsP6JEE6VDQiSUQwg

8GItdFObVTq/bnb6y2RFivYArvSxdO+7XinUcBIRw49HgCDFWQa61667dY2/Ncv78pnENW3i3KJ8+otx

dYJDwJCuAIJPaOThUGsOIrqKOnWlPgRCFSJGeanrSd
Wlm1tBYSr2BXCpn6ekrhzYxrOF+BHhKGG3suTg6/cPhjtJtS1vj6XEqsgRi4zCwnr/aVPCKMe4ZiviVW

B0GuoXjVEDg2OjWC2FJhzHVOlhPG2HYk1nddSwABUaP63fmnpW8gq9FIaZjlcdBspCHimKskCkd/OVCy

S9r9cTFMNFAz5iY9230ERXyJouXKS1NzVtSrRTXMJ+
2xct1IKm1GZy9Zw+6bghd3NkDT1Rqajao0P3I6aOaJbMbHDzpOEOvfdadZV0xNwLrDixmRt3TNT8fWzi

GE5d4dpKTKmGMAj8lcB//8tgXVsMZC0B/xMtB491xtOhlP0YIygq0QKsWRr7HiXmRdelgej3wJVyHcu3

Ug84fnH4jH6Ebwt2dpEg57Ppxa+ZGq7bUTDYPQtifz
p3NjpnA6Bg8lKc6vzAn3YXE/txvXfAmQeeGnaU5PDQwkpfr+b/Ro4sJ0OFiLa0nxsQiU03j2RGCg9neM

YvFiuFjYlXGqusT/A60ROfa2uq91Dr07km9Eix4bYijkA1sxsOB+FnhxDx2wpeMIwrz1FXD9spNaTwke

s4Sf4wD4HNIsoWEJfXzaY+6IfoQ17BKD36sXA8M70/
HSVVSRb4bF2td0hM5A2l8iYk2vfwgwP+7eLwS8UL7rRoJ6/z1tsrEFdK4R+5wdLur95na/wxdABqJg0T

yT/aqM5IxbDXxniyv7pqC/OriowdQrDnR0h3BWC1r2gJSq7H5ybv8NBZFARdGpKgmpfzrcI+4XLkVuWX

sW7gvJCP55A/FU4l+LNa+wJ0kk8g3vHI7y430iCTlL
cl9xAUOmS1nqWZho36zhL/LQ7P3VKHDRhJWpCwWEDr9+Oj30GI+Sb+91jjjN+F/JCb433XJnt0no1sSJ

idz3JRiQ/+e+jzlRSXaRyDzVQ9ogAOmk1ARl1Dc1uFwJ9j0JN0tqw8KsjeWCzFz4xqSWNCPXGO8LMw0j

3zcfk+JTaxds21PCCXR+gAb9sxohl4I0ECfqEKf0GK
rKmjDqJchK7H2JRCIQpUm8zMWyqY5wfy6hhILFfiu3+F5OSkkJDEFcv25PQBMt183CQM5tIRyBJcGHcn

YjNf+uAkqX44/+lY2ny/zr19IRKvxb15DQSb7JmHGgsz8WfFl4jgQIdGQdKaqN3SEQL/ZCam3YNysM1p

1n8EoKN9DXgaW4HxCrkSmGP8Grip3TaOTi1vv/DSiV
ASkQSpszeLdeV0OctbXzyv4nmKLJZ8OvVyRuONfR8Qa+gmAaPAAB9yKkqIc0jkboQnItV5wY8ki+jOQP

p5XMfTToJdhr0AJFbsn5lLD7s5a7c+wGk6BWC0CcdtPFrCvT+hguz5G5SLonrkGNX9WkC+Q8wgmZbfWk

rUJwy6zgHrFw1DPvkb3O6xoFfj4goQR6QG0LM++ZqZ
wrWHu27TIIY7Vs+xaKGOgdCTNzhBscTrJv3fbHzU1uSlrj/6Mghtv2HZ+wigGtNIj213hhmKVvWOj40y

FNEC9v54Ygexk8HFPHD7urhStoAOP1qWy4v2IKS/MYr3F6XzBF2H/ItyL7ycMm89ughjRuUJLLUz8FgB

t7jcS61efqhN8ZCr7lXMY2KDDrrOB9v96xb4d5n2ph
rkkU3XD4C97e4x0taahTpOh9FKfCrjUhA3G0yrix8Zquxh00E+fPwKI93e7TCHMx1E7dYVXjhmph5QIe

6H5e3OfqLk2aOYnfTzPCUNWHqo9eFyUxORaVYYO1l8VQGgIgPlLlNXPNR3ge7x99BeH5X35/ANDYLwrJ

DlIMj7UOpjeCXbeK/vKoOEUUvtGHeu/5R+dKUC8qDf
vSFVyysCLu1q4KJat4H783K8iBK2vHBze1tzMDEtIg2NBhFnzcmYjnL5wZ4rVk82nd3t+971QRe0PkF3

e44R5z1thoZ6OQUaCnTZKIjsJdMHyZfahcmWWD2Xzy6hClTCyH6sT8GnKqC3urA19u+oFMan0Cq2laRA

EFSgPx0m+VFHk34rehivTGnQStlGDfBvT7M/j9TIN2
2ejyL2iHw4zjzrVdnFlMivwhRguCCSG5A9MqpHqWclRYP9gbKpFOKFMzy4kvc7q6jolXbMHKhGenUyc5

VzzWdfhhcpBM9/BRuL8lI06SAqu0x5rl4+m5kQqJAZ87Bpdc3lvBww0gLvi8m3BcqU1yq3x7H+hJgfKu

4PQAv6H+4gfDyjs0Il8t2gIi792sx1JNgqrjopGDwn
8LbYusm/kybEDPXAi9FoWrqb1hJS5//DPEi4UPWYnsZtKoUHPaOAf1dHeRMmbnRAa2K4c1zugXiOWIBx

oFobeLeKM3mRRKiD6lgHzJYkbwauFtsF2s0zS+6z870KqtN5+nzbXm4mvIiUyXiGkvQzNKtnuM7uuJrX

E5PW6ip9RdyLXb71vHaX/qiH21UcjX9/pcI1D6TLaC
PD/PxmVpgvXUmwW6hrAGsz69Pw7Li6Y8ZEQm6dfrSjv2uuEhqS8/yZ+sNqjlbnqwf/9s5IDV4my9VXdy

wGzGW7pjNNt2WjIv39LjeeyOUGxY/f3Y6dGXQL+RgwR0Vr7Khm/RzDvHWCh+73SvPfbCzTIE8ef+2Q1E

SYhz9c2sCbtslgjTbmcY1umlIdxSu9PoZmEk7zU1VI
5Hc0MDwKKFH1QMTicGIoDAuOUdTEiYtByy1a2Gekmuey3hl/q4jekQveii8xxvIZ0DuL6GSxpBhQVKrc

KBgnSB0KSUERWzMPr8+gY6WLuB6Qizhf50CRB+TDqgfhSX5WE1kIRUIX0bputa3d/x02RKKzNReV/wd1

O6U0wRfxEQ643XbL582q+IjFDc+tgxw+QSZPwA+OQE
FSw81Qdwjk46IWHV+gze76LmkOmWHIbWSwU3u1zyRXbPT714mt2ae6z/ATpJtknU9doTFLShulX+Ev65

IoD7HZICQ9ZzKhxjuKdIwUk7d09LZ/afoYXuM0l4nuUPTNNcNA/amcHdLLc2N+1fn6aHb/IG2c7cJRrk

2E51h2ZfgYyFI9r8Vv3L5hgvfnAwA3ADSMeQrAub5y
xnUuRnEcINF1WH3DemIxQ2gS8yICa5y4EYAssYMAhi/7+OECE3p/d1dmhvY33pMpgNEJ/1tDeIkXsztD

s1oO5ohSRjI2ZQiw95me2w9aBJ1WqljmTv/mKXnnr4fJ+hdax4O/ALLj+C1SS8klzzHXdbjzAZzfT0Tz

1TMg3BzkrQLzjBhfxfu6szaB2qYpsDuZlZLR1lzT2a
/ufv3i7La7VN2wjWjQYqBzNB7/OvEOb/CbZBuN/FnVqb9M9sJMjafuaY/xfRuK2HUmfqwzj/dDk29I2j

/9b22ZAaTMKIFMyOUI9oTUyeviK99vmhQD+NOj3qhJznAOS3kg0OpHwkAQNuJCP0mRkO/00Tks9rOIJd

WZahAzr/+Fp4ugGkPVgonGrkbh87UNLyXnUYGqS7wj
NcCRZZBvRscSrPXeKTo0TtQY0XwSMFxTIrbbeWdzN/shbdiLB+N/YbC95rijqUVeb6DasCSZ+skPgAgK

xCO6XWHaTYD0z+BhIhpJ0ZfLckMdOD2PxmemJd0NNdF+Y8Gz7/89Aj7S5CjjX+CXPTiKIBfStLuVIlfj

Vp9sTZgxXemJix67WAI5aI/7MXpq5mWogJt6BfYtBa
4e/Ix5VfPGlKKrK+aZr9vvtwShSkJQtYWeTfWmHnc/WVhzVhIFZG77eNVuvLPJRswm+CEr/JJCYk++/j

t1Vdros82fRgPh0rvxbbpIB3dw8VC+twTawQNvdTLVUnEiNW9QU7fNel+QiiS8OHivf4VG5Rell4Bm3m

Bf8R8FXpld5/ZBiRhCuGJzrb0xw+6z1FvDa+rTJ35h
YM4aQ1YjovkOHt1thiRQsfv1Wpf84H4m+QvjsEp2TfJDJPAvXDB06ldRISXK0VbzimkzbV7AwMe3rnak

tJt4n+GwmVDgTzi24+3+421t/bqFlPNYCfKrSEq3l3d+97RPX3aDUMouUQEhJ5Y/FwK8FTT3e77+pP25

xdGoOhd9Nr+36EnRyb+v3oH58UffUIxgsk961SQ3JZ
5Uxj42rQLLysCQ6Cyn4kEn39GZBIjEZhk9C8SmxnkzTSCruK/NHPF1kzTOMRQUdhVSIBYJe0FiQqXFTE

yk3HXNoJatTk5HXd36hpNX84qdX0Wfs3JcAXLjwcVhEKJT47MdxLuiZBdgnrm653pH8DLYRvl2I0aT7g

BK/awDw2hKmSPm5IpZP5ik4eug/FnxQckXyo4PgHC2
0ynqXmM/bgqRd5EZumn3GkOkJO79EOL7IJ17/SB/4/T3H+u3wTPgzVV+af34WnQMAodeAX7nIJ8Az1HO

qeW6ID46/aHJULsV45NozaY5xZVvDC6uRqi6lbNNoAcQFSN9khEhcSYUULIvVq9RxLyVh2yPue9GyiVP

xasqiF0hIVbFprsLeyEKd09M0Aml+GLKjZht8PoNHJ
RHhwq2b/W1D1uv35008VBkKKN+xsNy0l51hmGLNVlpnoGV4HIPhGHg1TgqstcblyOtnjDlg3ZWoNRiSf

4HN5L+DSi3ZCTKdHKRRSCEfbFq5bG1U5vv835FYAnjmjo0GKWEi5JqRuduUpSOkjmPEwpFkSmNEIFjfV

JcPoMmzrUemtBo0rFY0URLHsCQ7uqTFhBGJUgaVG3Y
4gE4ODSXbOKKZ7HhMMYmfY6I6s925eQ8YTVss5ByTjMNRaW4KLm3Y+/Kj+AHMCISzC2gPgeC7GJpP2HX

GO7pq4GDJa7+o2mAYFsX95Mm7iotXhK02VMRpB7Ldr7HYU5HTYw1Enrw+3EYD/SrzbN72PzDJU+j3tZs

5arxH1sBdECp5Eqb7eio4hIeRnEZS9owHC7T7w3lq6
4itbbhftBa2dihXHg+muffPaWEmkyxSQ0gY+egFSyy71rzVnF/WPKOI1DEw2s5+scjcKkZfriofdXo4v

LhAsin0J51dyZK9wU8fZ3R5rHlZH1TusxD24nGeKR3P0WCvZYKWJGee9rDmlyLLyxJ8/1M9zJZ46B8gj

rCxAjZ0t76FIx6JPfCLZsKIQbXG/r44EdY/U2RG1g+
0dLDRpA+1PFfsYZnxcw+OZEg/wvvMQ60vmLW3J6J934fMz1nkL1s9TEgtmB+0CIrWkbkEg+PMjkOc117

ba12ZqTWXO1XuZuq7bUjhmwVZaE2EON7EcwznH2R2eHTQMwA5D6/aDGz4yvmMeLsxq6SLHOintTmcwo/

llwj26JaRm6MXoSYd5tDizuVAbHamEHwVJFLZ85yep
262LJbl+6OKPPdHpkbN8wI/f6VkgsPeAhsZIa4diXPArMkaaC28RW68iTI6GpNeiQc69cEWiu6p1+07h

lXvymU44WcBuIOyUi7Na3VPYAc/DPGb29hqtpTug2B0sj9s3XSNE5Rk10njjlcxZxPscy5SyEs5h1ty2

dZV7jWmNSitXZXtjRmqNDCb05yf5BwDpbQ3o8riIah
paQjiQKDQIZo8OsgxU95F/Myg/zm3MtX6vqGqm5GneoG9fYin+b1/0UGg5+s8TYQJfDy2JBUuisECQKS

Mw9d5EzxIdNcihcKza/F4SBnYCfuvvjKiPmf0vnO96bhSMCh/C+CBnVW98OTyBNDOTlKv4SHUD6u2Hq4

KvHp5r9TXBXM2foizFhx7FTGttiOVrefbj6eG6IxeK
2DnwgVJZ9gnzxWSYmSWowg1HSVB2774/plFEY6b40+jZKya+6p0qfqyl4UUx3o5BAPm0eS0ti2p1SKTj

YeivlfkI/bni3k4mli5037HGvbU1kbDHPQwArKwx2nsK4UtJfaA9Wj/g60SASAHq8FGj5MPtO1JTGi+i

kKxhrUvGmjbHwpHY/yd4JwYdEPs1QJdYdi+X5g4hVV
kFYCgJajyM4FtkVzOUcZvrw43LVA/tYY/Sw9/xheL1NgKs47Wywj2gjFVXKeyT+tzf7/1lW8I8fYS/Wi

56Yogwp2vjO20A0ORkNhPymzIdDoT+DIKrtU6B2OoLN1lbgA8CB8LD49vzR5Gh5nfR51u/YB9EAngfTS

tKG0lw/qexBQ44mo7DvjDm+h4VV0fL9Kle+MAuA175
uZWDU3oO6J51no9VlNjTVrjhWw0MHv2eqaWYTkR3Dg0SgA9Bypzj64niRqmHeW2BAKAQp5MroiP0jSLS

mPJfrgizWDDrKNJYgJnbT/mFxtoZr3wdPf8wDs6DAr0Ih5dnumJqY32tTO9Ri0C8NQl9uHHpfr4yEgv+

pKXTNN/JUuyOBVMCHTLnkhOS2auxhFIAWPsiQlRVi3
4/pdb7qW0OAE0oN+izblhSQuTPUXMVAmGH8l0GA1pRmLaKtDuz7ciR/s+sA4nLK+NAEe66yrK6s0+C0e

ORReIt1yq226yLwrC2Hal2U9Gz93RpLlMZ+y+T0rOCSLDxL1sqzcxZUboIplonfb/vQQ5KLg+AEHwWul

CbdRQzRc7yM81BC1f+/v21C9gWhSvcjKnA1UvO6DLD
TnSBqdurQealCleZLSKORxwoncSxvMS1r38ertd7W5FU8Mt7IYI7cJ0b1HMUBURvRxyt9RIiUQaoVJQc

L9PBN/gc3CxjMvSgXImJEgtOcNQJjo+ZRNTrUdoQLZFZZHAeAA67gGK6tsfzNDhE/nIFSNNYAYmOIOnL

DVVSbvInTHSMG9XY+EXn3ezylC1a+MGXD6TXF/K156
S50dZp4yDoOxs9aNLlYmTmAb+wgrkkXub3Oe9ZayeyydlRHICBd/RMyi4iXdSCXy0CvaQ9NHKryhZ1I5

h5AHVDpf5KOFn9ccV1K40xThOcIPjOHcHwSp51yeoQBxd7EJMnsnOkDYQ00VQ2lTeRk+Xid9P63M9Liw

YjBgv2wlrg2AyWMnOZZkTRsQMIlmTphnvnU3Xk3N1n
UDF5W9yT8apO7vmO/gheXu0m4/m+oxFRdSfU6VR1v3eVcsY+H3EwAblr0+VdTcvnmZvuZhFmzwo+U6mJ

SBQqjtpakXV+TaVhS3D5gQ4io46F8hQixM+sff4kglFbfSEYBTwpfNihtY8zWQGdayCPnzQvQT+U1Dh3

LHS3a7+9gOfAUjavpcxY65eZVSbepFq0zMR6cK8JZg
r/lFpbmtJFxYhINV4hatDgBkyIr5qp53oYYf5iYb1B6lfgHJ+7csRG4cK9C6NPU7w65/FYCBkRkjYDQe

oKatii9AmOaLWuBVWgfy1/AGi2a/5vtezAlP+IhywQScj+qHFyEs3hPydPIrYGj59D2mM+hlqdP4Vql3

eOkYbFPwzMCSEt3vY7KEY3hphMHGY8zoV6+up8Ce7j
XhPJdEyoZlgkv3QVBeYBxjCyPiPo8bwEc6EuIskbyF8gh6BepzS4+teIlkOhOoic/Lg8pFBYLeB10oH2

8HjRsgJF6kgUBmoZfPoAi+Yltk9+iWvolZPH+2RI06Iwx0b1AcQBoMies28dI6gKHlK+5uuH6jbcqdsv

BBhrQAnoulsdeXaeVh0awiCGrEB+dxzCvHiYrAVMA1
NUblnNNEUOuUhGrEydVoFGAwYqgOZvlJ8mXnI5sUjh45AtgcntCe/szJ7e0sactlC5HpFWifwY8gH2k7

l0O23jplzxjVaz99tJSZn2q2aUShEMi82dRVZuxtn42m3adjDqVznevpz/LMSwj4QXYilUSLq0tTu/5s

JDPHDNno6tpKc69h6mPvH5fVyUSy1bWoXKGuys+6/F
z/epEwD6igsufqqYvibfjcmPSXxI7yJkw5ItaO6W3zz1HtNZbnwamrt+i2HzNosLj8muJyVOH/ZAkEf0

g/cvFrrkqhjnT/Cu73XtjJm7wxVlbrsJCTgF2Z12xJ3+vToQ4hdmkHN3u3dThvF2b6W96B+sqeHh4ECx

Gle77nnaghNeNaLBwEDHCSt1vbB1aroRvuGVHr/FKj
/GbKRX6RJ1rw+Uukghcts0Q16vntlrNN53V4/cJhsk9hDIClbHh3JPzjYTzL7pB8YvvlqJgiBsrgghvg

Chava/IJ5EBQ3kp/3s/8bG/tudKatseyD1eYZxhkmN+8QDsr5RzB9urPEmeb6J9ZAapQ3kaZ3CFvnfLquF

CRf562CnIxlX9S0x6Glyqex1vyb7yJxIee8AXiP2z7
p1gRmQ/H00H+bOarg2Ty3Ci9gHQdXq0TNxj61kdNPd/hXYPJxyzeVviBuQNJLvwQTyYLbZDAWobrKhPC

sQX5pwBrc266kfOcsWl3T/d/4WgzxtjVgUfJIpGX795VKp4YCU83Zt+jdCE9pmPjGCtfmQobk5BgwBTM

1EyiMltF1WDCn3cjfi8UUyg0vZo7vs/H/mc2PnDeGO
nzjtssRn9UWCi2El8zzsimqKOkF9GjwLjuyOnxx1+Mz0fCY9Lk08CavatB0DoLJ1ci6DiloSyi8/s3+N

zPxdSYwVbMTv4ne+B7hO0HpO/iKuFX8CgvJRzFiBCbn+G56BnmpRTyB5tZiazy2CWoNDkOSV4hFpO0qt

N6vTVmlTVxjB5/OjEEN4Vs0Z0/nU0VaitgTZNabo0R
6hXK8LgY+BK3dI86j+NWlz9vX4PQ1t8JpS8QES/792hAUdbN2wn6aW7SB5hywic63w46CJBMhyBXCTUT

WvH/Msgx9eQ963AXq2z5lfRVuZ4MjXcdzd7IxH9c5hmzsumhIW9y6czIM2Tk4SKeNnU9qQ6/6AqNNe0Z

vq2q62CWEpA+gT3Go7P8z/MZpfa+j/4+tyfJwBEe0Y
MTC9EQurVGayrEdrNiafiwE7Z6BO+COAHfhwfeIZAaOm9qDIgrE393SF7hwOSbzPNnmd3+YdfRKCbX+t

YVPXLtGS/PLBuICCZeZMi1cJTl/pTZ9lTk2tHH/W638uZyuR8ZxNwETgdme9qGBE2PZuXIzHYtHhRkAp

VdV/F5qsjqS2ddl64to0GC2PvsqcEOd/c2GpQSxgRM
03naLTaTggod/K3eQjMfj9sVt8PPb+OxUI8B9lZWdiQuX3CMfCUwn1i0A5q5oir+aCQfPK4HiOAsTUcB

1PseZvE2VpPfoiGZ+mZYOWZEebKX7Cx9TJohusGAIF7REZQKZKII/ldEQsds2D67c16/pVdFs/ORqZx8

BbrpTMc6ISQ5VdMHwCfOHvjTyEbdMKVUDW/WaGfJv3
098PPqiV1tZ8O7oxfhoH7fVRfg9lrAK0gfOXyhT2eThm21onlBJL2eyiOhF2Kz+Yh3B00ci9BTe773mq

OhdRIr31Vos2MrE/BRzs1pzKyrLq/jLd+MvPTvSYUIJr+yDnoGuIW9DCOJA5Yv/ruEFyQdED4pYUzEkq

1WrrllUKI5hOjcyQDBJYZMDWP7BqgkjysMl/usQnGg
bAx5nN+j1URifr7lhakzwYkRnxGoypInVeqs2w2aRNytM3dT1HrqwS/6O15oq7IWrjRIItu5++M8HQIq

u7x1Is4ASmRWCnSjzFzHMtc7unyRTPXqPozO6fmo49K6coLVTFgxc/9pwrtVUYPkwbVKh2M92/qn6gbJ

5oD3ZefKCEOQQ55PBE2zU+40ePoDPFluQiWf/ws4j+
9qa4X9v9AEKaWTxmoLq7lGRsc+hWNxx2ipljgHdKS/GaesQpq2OrLl1LyQKpeZwpqRmEZNe7Q06xRT82

G3rD1syoOnXnJzssc/1LVnR8WSxN8ArlRgHftYJzVGAQH8yjeEakShrR6g0n1OulZGGfUkka7hxOfPz/

+5+54uosmoG6CXk5Rr+SJu8U92hzahdcOTAxsL8keI
OVSMhcM4dO/hvQJJYcbwisnCYFQmy8Hs8rLzdjJkAwGzjSX+tZWkbail41i3XNugKbmAFfmb3gaWmoM1

9ZD/SYA/mugHUDeGEDqV4vn/XdU0jmiSiYVH7a6otJCLZp25aK1XWpzd/dl8rVf9DgPexgMScBfoMEnL

JPLGS1Lk97p8UUMSpogXnrJG5FT7iZgw9cl+tTcG13
mt8ZGLnm007Xhj1pEu73vMc8jgFUkXrr4Y3jdI/UFLwwb2hnWAo+41hcA39QPesBqY97aPeVmPPhH4a5

6xivAu6tuT4qycdOBAbslUIwtaOM+ogbHGZGS1cO1w/TjJF32ZPFg7kw2iJIcbKZr0Svrqd8+oskB2zS

aK9oJjluiVg9UsCdDwHAwS22x+FqmPS9ICI4RFQNFN
097qOU0C/zc5rdGlDlbJ+dL13lIhNrvR3CLMwhjrwETZ/o83dblwvSh12wzm907v24CHuchX9jSSXX9h

Oie/djejUT5wWpt8NewUPPatjwyGyRWnfCHGNG/mvplHLtY/KUajL2ldocV8tpsYTc0DRZ2YeSRMR9Nt

tIOZu3o5i6F08roHQO0yuaUAugVmCkQrvSDecGoQDj
cJEAR2zn0QbVxv4yATJkwiPFW6LVtiTjrSpJEhN32pkArWUik6reEhctCeAhYSHX5/uuvNEvJttXXUg2

gqPOuMXygoDDIyDQo0ZyaDMwa8NQuP0s/DbIinV1ZG9AwgVJVDdmU+pIITWHeQTaP46vDHeXb0XkLIsI

KwbQvywbkFMmuhe+T3uVQtLCBtOYOhBpXyj1qcIQs9
Tq2MD5BEvzOP484bX1uJTArDX4J6xnAATaJ7UkWUZfnEWEXutk2B/xEL7J4B/59Rcx2ueHcaYRmOLvKr

y7grvgl7Nq4WuCzhmpsuINt2SJ00pXY+S8/knC0DAGIzz3lc1wbS3sHmeVq/p5gl4iy4DNy8vWlRbY4C

oHbOCaZVSukBuAaUrbvASf3Y/Snc/nl7joDdzYKtxk
yWl0Hf9EJa0+CWN2jQ/TdEPT96WUDC4tOQUPt5M7TwACb7u3/ChEdBfsmbPQBaYgueso/FPMvM2iJSPa

RSCYGNHk2hpnsHQ3WW/P+niV2bFFOU55sSDaI6z+BR9KsKduNzk35XdldZjpBf3mrKdGyx+gM92LK94G

7MPXn+NW7ThRTiu6oANmc+F/xUXoY/93sBvgtcsOLu
o2+k5Xuf1xhhvZ59mJN/Awa+5au7pyhFQ0nZei5H5GGDAJLkQKCw3HOcL7oDWloyq2x/Y+5e7NVGMj5o

wRzFbF2x0qslHqtYSdSxhW2d1rXBJ8/HuZZCrLpwqxQaIiSoAFe/P8gQQ7RcSq4f/8CngaIuluwPTq3O

PC+YxxDqRNB2NrBzS71eRnQzGoOWTMO9z7MKQxHLGD
0xL+WztwcZI+SYk6nwr0R37dCxoE2P724kdO4SpJLS8jHc4mqwaRjOrzqtvF14ug2YwopwqeewLz838S

rKKnbfLT6YP4zHOTqYIpvWfW0uOqZL6BUEKRdu4y4ZGl21zm5xZa6KG05kkEQ3w4+SKgnAYav7GvrcP9

ahGzrpL45ePc9kHkNY21I1Q+ywfn6ZiKZotTCqqWN3
E3up5p7DuSuhRtKo5c5zNa/x+uNkWFPDjoA7zWJ3iemvmD3hg5GcBtODsRshy1mccBvJMmKSRJZBpm2o

DRw2RsUheJNIVM8bMAQGE5qx+V2tucEExsJsfbakVdATUvhWWOC7v3n4tk+/CO99e80OHu7kaVdFUin1

BYVkqXzLEVpLgr0XhzsQNHEYOPoSXGIe3EXq/T6j73
w9CLyTEfDxVPbXkuJBpUw1cjZ3CowzNuaNcAv82hm+XiNfrTY6DtWXtrwAHimBKvJ+RPGSpz5iuPCrlw

g+ORa4ixA81A/ERbNJ7xlyzkg0u3zDKFVSAJz7kDh6llMdiMw+pzVTLPYLriqXhc9itttWbgkngnrmLq

L9ZVE968KP4IVxG551cK5CeO0CkaVcHiC5GuttXhxA
GzeuhTwXPHCkHh0pv/fRm4bfFSCu6in/Q1cQMqSSc1TtJt2Zr/8qpVTpwfCSwSFsD6/5t+zZMuQ4rjT/

XUkbF9SMDYcHzRlvl0l0Wh35trPgQBeCPKCttBVXkB2Xv3I33U0Kjj9JuLdT+qhs8IKjwTi9UbdqKeZ1

dpf9HX7ky36cHuJBERPtSqPgbN43jk6Kr3OMKKgF5X
nDqowRycWr1b3xZbc+u3dAvf7jrQ1gG0ZaEPFlBa0ugJNLXLaIXil2XdFTKkaoBrizFJ8JTn1ci4EON6

6MOo4LD+h048fuQUiOxWq37qSXSjp9Vfde/rzf2OtXahqIpi5/jZF7tnX43nWyYPGQEiHO4Pgfx9eEsn

Yg5FMaFV1ByVzh6SxpTsPW6bfCo2SOxl3cWnxM23ZV
4i9TX9JuM6IdcKIc2nE50U5WAztFaPZZEzfrYsabp7IpJ/7EESyWao2OWychGInO/Da0V+YzW3mGDNhh

CXxbigUuotu79R1w4hwWTaAV7nXiDVhpZRjXm10r7SBNVR+wnn5AVlMfnE7LHVVEEKythXwj61Mnnicg

JZ3cO1775/hGaQ52XrPGtZmRKf3TI5G/P+xw25fisb
Uq4EjjjPv1S9Q4USx5dTvSTC8w+Kx4CxgLNftRFx4mezEKEbv44sFy5fm3c5aJrBO0EkHDbCJigAuvoS

9aEL8vse1kCurdy56VKR/1bVBgwHKtmEkZQ61faFozzvvW0fn+wX+kpg+TCYMIoAEDsyfQkPPRQxt4mF

oJVms84mBu0FS5cQDEf8BKP/7ykE/UZtZNVqtMu/zA
orguEm7L92DxkkvKCkwQOwj6R+jhMXTW2bTyBiGaMsQT6krKhXxTyS6xlDOh7OvsSNKoHgwNkNdJxeX2

xyRIZS1MDhT1B8ee+N7KS1yGP2CuvBmfUa0HoHjciuBz/ff94Vgb6uzT/WiOszTIULJ3LWonf/oXcqJn

usl2o0lyR06TccgKSN6hg5xrCFlp6w6tZlNeS99fDz
FFMMJ1d+B97TKubOaRTzJwxKMg2yl5nAqRjRnZFMNBNjTkSKvrBPWly0LZjTfBBpJ+zqmS3s2b+M+CZo

drQfRzO0wpSa2/yZivjsyNlPwl9v7wLJeeQRGQoa1jQgVelMNryfGDWn0XiOJJaW/ydZrlnhZQG6BHd6

bazYQg/uurwXXriSUty/bYy18wNFzBTnASyztF4d/5
m4ZhdrDOOOz5vWzemzloi+gqSyQVDUf1+xK2LSrcZeaY9aywlSixcIoj6KJf2XQ/BtaJEfQow0pBAKFo

lVWu7xnM05fzQiM3UCgGmP54UD/7Qqn316IobBcffi6xXJDhwUhCE20MCUzwNVLT7Bd0RhKnqFbzzK/R

7CjJxnTywGNoDUiv0nVca9zTF5Ci18ZWT/T/Id4dMr
Oh5wDt85Trk64bsfaYKH2gEpayCVr2d8G41R/c745vtoT4YBS+pZXf3KYQW6YSwKtz8+9ebDlJkTaezi

6So4Qc0H+v+1Idki0DqbdQYzwNvmE911WeqN4TCP4W9AJ3ha8Ksfa4Qb7fZJQ9p9Ak4itxALwgJZ3AuC

H2WT9o3b1n+C2VPHMBG1CyCAgmVcgI+TaLtxrQKxPT
YvC26EEF8Y3m0aYLfrdcusiHzYgmzG/zGzFel7KtVBPPMbVAjMTD+86hJkYfv29GOI8drr9Sco5PSrRk

aV1bT/foFo6m545O3O4RjR+L9shEtEfXeyA7NyphX+eUUei8NXoZtdcc6i2PiKvCNEWLyjozsejI+uQK

jpMflnrPTgI3NywHlzwTUMZyABnPEMubIfsuui62N3
kkMQC3ZqIsgTNRFWEH8SmDNbZ5iNfdhD0pApfA3MlcHd6/zt0/vPsZ5ayKbTk5+rGDW3ZPLvXzCQ466n

NRLFG755DxT3VFSRMSrfiGhpNXo0LChSa+q+Mm4PJtUd/N+PBB3gRgkKRRNF42GO1rlgvhTiiW3w1y3O

/vQ3fCW9LgdimW/xma/0oF1njJYLqnDBrVtXAD5eIG
gZ6l4K/DgzZZ8pCEN+VZEGn42/mthRIze+iS+7E9/kIKyJyq41+rwAeCOTFLkfpBa0O4bx44612E6rEn

HuNHIubGxZAsGR8sqhDOQQmg814BIIKuNHb6lmN7aJT1VXJBCn1CxSNgq1gJz5jURLBBp66Ih2hvKkk7

ghJQVC6X8Inv58vSPHxdHQGzpP1YZtyKbsArDf4UIU
++s0f2fPCLC03OgqiYTQSAAN5SksYbhhrkD9+JZKZTXYvXkm8EXn81rSrYUQR/et+2MmBr1NFPVwI5s3

pzqDB60wBPZ/kdLQyK6rpBOxIr/MO+/409h4PApUf5tp5LLg35T9n8hfLxDaAWiMAPVxes36PeEHf461

k/TO1ynXslY4pbYNsJmM0rp5zq/P5M1bnceglh/fTp
PB+FVPba3sHv32txNHYLinyCw6pmWUnq3KkU4Xp49nX2x8MuMGCEf27iuQTG2UoSRp1CK4u979KpROvp

HOcg51/EEWqJt0viiigkp2T67HhP7CBS6C70POIMWjAsRxnBn+6cvPSMh84eg0kMoctHbNqFCpqaSzvG

nn6AQw5r8ff+ubJDXY6Y4/Tq6CK+LUV5kV6mZvodrK
xu1fmAo+FBnjgOz9tn9Zyv4Fpu/ax3gb2keSOdJpn4JCmHBdWXp8KVLW1CfQaWjL6f5XkyGXuJHRNp/R

HV+9QlAwTF8NmNY4JNPTeAo83Biwruro1RflYlNp2HzcmnmoxyZa9CEjvwcg5VB7bsktGTFMFDwWWLRC

McPRjSN0NrmrQ+IpRja2gBkwZKaPtpxfLRjNLJ6Scm
+hjuquajG7tYwMRmHe6MZkp8Gy1GwHgB4menuFjB8UE1H3G8kcKNMf9snsZBTjrqKvVIhEfhsocxyeZX

J+0d6OQTskB/OMBIjzMFm1/sKs+T7x2VXL/9dI0YfF85LVJot76PeeEkOoQmGzDCwD9750wthJme1jqa

Na/io4z3p+C7Vu7gNpMBM1MGAgeuzhml/zfRDur2fR
d7O40mo9Ofg4bAENRO55GG5IKXYuObk6vjWDvcpoedEOvdwhgeOXO7A5WisPW0Ry+UwZ51BrZX5m4S8N

GyXJ40FJiU/wzOqptZZbJ0PmQWhmHIBuSWuTUP624BLC3QifXynJ1oK5FDytibrIPwMw50EhIuPias5k

4lqlNnUT0vkq0Ji2E1BoMPhyYNikc74m4R/0RPk1+p
bnfUHPVrr62mDnBqC6acS0gnWkNZsLpeQij7Ou6FSZNc/8VbSXK55wIpwYD/O/PIGJr/Sxd0djolOW1X

lrOlR7S9+UfQRMR1K2JINtphJQprmm/vDspI9dujcoPziEnQVJKqrHFloWYODrMLyY4sr8SMzMNHxmB0

H7KBR9aD/kDtew1qfo407O4LF9R+GVYU8zIdq61gn+
zqOqPJw3b9J8T6zS7WIJYR6E00Bd+sfMBkR23bDqvfHbOuyatWxG5N/SMXnC4JK4TokmnBeARy1U1goB

bL5QnxomdRdoOMfwF76+B1L6YP4uTrTWh+MzacFcYJLRAiIjN5RBbQGegY//MHCdCd4d/fjtYcGgOf7M

ZvSlvdw7CV44G+LqIo5Ux4w+A/w9BY5eXczc5mYrE+
u9P+Ee5f/J21Njd4LrMALduHyvJCLus0vGPpFDihBCAHreGybqj6f7aGXfoyoHw0HwhPVL7Ip0uJ9GeQ

hR/aw53npwwHWEN2kQrVzs2Asf6MOpuouivPAUWcZEz4DfX1DSOnVegV0cQP64IxYGGP3CCkHtfdFGX+

Vwxz0IPJ3R9EQPR+nr4k6bs0aNXRy0GF/KKQZ1OqPd
VgEHNOyMzA7DlFPqznBH8LlpoZVjSR7SwYCnL0JXQqtp8xS9+o2ZDRjJoQl27EOuHs4iGyaxy/aNdfzP

Jfg3hX7705UKAHOryHhTWWj9v6Fq/m/9q1EKwrEEzaCRwIJmrU8A60CnOhtprrH7s35kcZguycWjcC7k

7WW0Mr5L4HedItLwM1rOlgc160xwtmeK8so/atE96v
FBCxsY7gjjN3xvadGZuY3srh6EXtz2robZpZdGGKl3f7WXhZvFmfuo9nLrTn9q19552yZHOHlxdvPgoH

4vlPzBkUqq1J9/d4QPJ6DOeYwdH4OveyJz3bq7rH7tcvFyMs6qqrqYsPgmeN/AM6+40weo8cbqAPglZ6

X5QwAcB7HbCHslafsmqZveY2SPGPLEoHfgxVkFqCWz
nYxK9b1TTL93cjRTGA/5ZVs4gbKInNDgCYUeBUhl8IFzkJjzYLJ8d3CxNSIPwJOD+yIg/5itDHUKRLGJ

uHgEa9uORXOn672JVojb63ClJfINw58u0pF54FJ2ko//a9QCWLZUo5IVNSAE7hIXzAps5y02vfrOK++d

tpj6LuoHJbgJd2C/7OfC3ZL6DQCGAN8f0isqUjJMll
aAlk8zg6k6HWz63wtjjWluTSvRCy8C6epTH4fD2y6amVP7Xu5sBkf1Tu8XbwrrlNRbv97xVrYL0ZVKfj

W3gf7rhzjDRBLCJlI6pPLLgKDZSyt/Me+yLCq2m+Vl9dp15xAwX7XAIq43xGHziI+MVZ45+X5DNtJ0sj

dJ17+TJMiWB0QC7fFT9//nHaMwVsoG3Y3OFCGQS1iy
YG81/IudUEQM934eZl1loePXFxv1CSRDRFnsmGwUv32tGu/6SqA+sbMu2zZTSjRMicuke3N80d02z50o

H1dWkEwuxi/t+r8f/TC8XCLdoYW6qJAy0Nn9pijuEl+S9b+KhUe7eVnTfV0QfmDOLfvBCjeTxnOPx+6T

B2WHRvEm2ObyA+V4VqneYX08TXRxSt6njxlJYbx/3G
+OJRKubs9t6jQEgt5Gt8jcXWjdXH/u2Q9ZnlIdLsqzaCol/j/6nh4sVlSeEQGl3M50al9Se006beMRBE

97MiHgqzLSP9T2vp3va9O4hGhtmCnBAZTV/vcxWOQ90OCQWSNSdeMDTAYrQdVdN4Dv60w9POwp0RFWof

PYzAP1a1onreJFo38v25BVGkighe0p7wa9JsPtgVaY
gP0RMAU1UtYUdIPKZjymUy80sh59HkhF8QpndJSxO+6/HzSOile2Vem+gFkA2QZACgSlxSeJCpPDwgxI

PJiAidcm4jmnm6Jtt3MsyOL8Bq90i4vr2IT9BRZ8j7nUq4PAv5HdJT8sxKguL0acOqcQ2Le/l20E5B1A

h1dL3COrmNrQL3le1fpID9fQc+xithtSAZHuFQDZbk
B5z1QMVrvgo8VNntCX9UJTt3ZOxQ5WOzYfNbqT7AcdJd447Cu9tITz0W66dfxliyqoLAs6tuvEBxtL7o

ct0rL3VSW/aLc9lL7oDCNrQ69WJGQnmSDXeU1ytQeHJVkQnyixhMBa3wIOK7irJRd0ifxlvbwY2UDVNZ

7lHbIDvDEaGxUWYfnWwftNClieRbFaJQgc+Ig2ZbKa
vHHVERc5/QaPrc877Zn4QzVjJk8dSGnsMg9CeNzq7wE/F05P9uKcxzGI+5pKm38o14j8mwvDSlXUO/sf

aZJRPZHndrDkOFxy9YSFTAecDrztxFwOMHV1Ivj7vjPollUVmpYTuaGtcNzFavLFx45eur5o1X919h71

+BaZf6jHr4D/kr9ny46wy12iV7NYBlQyZOVbWSPmrL
8RAWHetURlC43SIPXthsAV4rmnxvFTwVkMYYPiW4g8Udvg2J+4kMYendosyTk4ClRpZGRKdgahwP+1Qu

IN+sa7iMlt1YtfLn/8taGZBK3xR184CWC2RTTEB2OQRXQjO5BUD/3ys5NHbt9JMO/hRrZxUxPy5EGwip

HJ2t1nh99RphhUPWu9UWAiuqIc1+mPhkFzabW13BWf
iAAD5J171lijIpCpsyGjrfB92g1bJN1LWfAs8ULZFt6vU2a8oNkNCT3VJnU5l0WVZ1RHiKcIHmvTjOIc

O2pxh2rT8iTlmegWFjZ6vPNVHLqC6U+MPiLTeoDU26ST/AORUYY0QwXa4ZUnyumvoQfJ8qxMHmcQ3B0g

DOXzpkZ+Jwt74e+mihbx6X2mpXbUP+L/ufGCNriiqx
xkYqCjfTDFfOLtLlsqUJCfn7WYzLAlNLyysJU8V9qJwT8vnA87wyHjnyHY+/uUC3TRQfOWrqJmoJTk3A

KUa8HdDlxyIYYMm0l1SHPAat87wmLsgP3Zd2Y58lJhzQeBN1QSQ//x65/oyvo4puZx4A8ae2OdwcKhio

Ik5ND0pj6d++zx8L/iQYnwWpcUCHdrHfBQB88H0m5h
bp0rSndNwnENJ80NJ8bL9OHF4Xp8ar5CK31A9sHVsiIgbNsmJtPYxPJEuUR0uYb94wkfm/r6TkBgSDDA

ljU0L85I3aQTVF0y5Yl6qJbmjixelzGZAPZMQMS7jqrXqeXzmzndzcNv97M4sf97vJsXGsGaANyfk3C7

HEehlHUUt/S1GJgtHDCPM274ANPYtVy6V7wEJX+aJV
zucWTkOhROFcNb/1kZuE056ZCs3sF5xvxGG/3NPEHu0AjAWJChBcabQndommIWH7kFAIiLvpKBPrOJtE

DezmlnzqMG0IV1+pH0E9WrSL63YWgQCscZHnp42GwVTef1Z13Jr0Fl5a8geJEnhBX93plcsKjPNIUssl

NUcPfB3I+6H14hyvt7T2pBU/s+jlpVWgvAu+R8J/tm
dpnyKcuXLI4L8RWMgtiB+nQRUo2sKItQcEq71uWnYLKwLWc+e3YL3JVBmc0V5XKqzM/ogjtmmKLxJbTz

aGqSBT7ZYCWw5CRxGtuSkzceJFrA7TupwMVLqC6rwX8yBDsSGgjQ1nLeApMSItn8JjRuB2VkHD9aE+w0

NdTZi9Cn1hUhyoDd/fEJtRdJ6dw3OOYp3WkXjsMLZH
HgDnZX9u1eZOE1xhLEERGKjeTk2rpPLshKyTPkihKABpvZ/33X/jzry8BzOGI2Gpaqp/nY2yt9QknF16

XRLdtxiQKjpl0OORrMxpqG6eyIhYZ4st7HcGHyHzTGUCsFcCj7//ORyRX1L17z+wCnhUgHIGI09Ov+Nu

Qq06uriY718KGaUybI+oMKRmca8UqoZXpxjybG/eei
D0IUjkyXG1Dshh/zjbjvx0UMya6GgHroRMHyK/5LspYRPMM/G/R43dc3Oj5M5/EmqQmogyo4wpg0MMNX

haGwug/3+3UWEV0JNJt30PUma4kSt8NzHgJJmVtNYFWIiJGbV/kLXb/zesQx+8crMn27zMV0OgDniV/4

nBZ6q7dkEC1/odu8j3scpO+FLU3MLu9Ud4tj0kXFmK
MYlY1kAvpzTyrDyVASp8FjNKb5iostVcbf+70hv1FECXO8v4mbZIJLZZLXOi2M6vEkFoSw2DDZZhjKUP

Ui/9Fpd8DBL8wfb/cuLnn5wTKE7jL72OkzDx539045wUfAdh8PhD3Ds/2iQenUsrAo7+3eA1ZVzUVszf

uulY4+PoKn5DiAYlMs4MZwHCHYrv7VpI2NctxRQ1bf
D8fVOlUURfCL2xakYLekfCaBzRCTjqTD2XZKy6wf3Ip9cJy0BlOYlsEVowaxlqJxqMx6vZg+IW/tH3LA

thNbJJeLb5lDKoVkkEQvNWxyludc2V7+JVNJU+a0ZSpSzRfX+IWa1mvazLBW0wfEv7RLtzpgsqonOMtH

HwXTpt682VWpBZI+vasndzMsShs5nMu+90O5TBgucL
Iq54Qkq7fuRIgW44C8yYJ063ClgHmO9sFhjLx7ZliFHDozs2JMi+SIREbAWq2iqpQ2ZItSGezy4jIUY5

dEfdrUzg5Y0hwcm5X1BcefVGKzjZTxh+H2s+uqRfUUg5iTKDjcYYjWj+YPPJZ2Ana6gme+ABrjg/BDHE

mFXP6Q8wPMF81AV26fpkJx7dJaJ96xQ5z9znfuHwCl
3uM2IU1WrbEysJHhdZW/7uAL1RlfDtEYYOATSnQDneru8+Fs7CA3xVtVLfqhU7bdu8nke+t/nTdYSzlJ

LjzsjEp0wR8HDs1saO84Wss1myphf/cyTQKz/FwHl+gvDqiErBbnwexoLGoBE6iYqHJij8EEA+ueJD1o

HBl3JWMqmwl0gdMcq/nSLVQmG8Qj9NlGwSbfdoTykX
12cmfcc2XKfbR6xUe7Yl9gbOsa13BGU4aYA/czQm7pGDUXlo1HmMdQioulBtKp5jxk+OOtLpnkh9JKeU

S2pyIREtxWTi6LzvaQRty97m4Vq/zltVB/LOAl2DRQh73zc/2eeSouLOJ6py+sQIzjcWz95FCXRW9kR/

KLFwc8yd86Tba1++GYKH46xnQqIQlVdUv5ZcIdY5a9
LNng+Afj4hVxasDPDXutdji6UTQvysv55C4pQVFhOGOLCEanXR6d8lgoYnPtBvUT3r7gihw564a66cIQ

j2phvzKeFfLC6RajqhH+VEeGgiCmgnzoOzW8WQl1Paed6pp6qzXHiO8smKujC9jVE4W6gfBUHE+oQdeI

cN2I77r3MQP7rMU3nyLDKgCa7ShPfH0Bc/LENJBD0K
bi7obdqIZKXbfygn2zYMN+0wLACmg7jUkKAtuCB1Q3qvArJxqOxOzt2v396VqBl+eixZx99CoII8IIqy

BhnIpPvgoPJ5A2OI7xzHq+AZI5dB5qJ/9bK3AoO8iicB+5Gsjl17MiKlvTP3tTY1MkjkurqmW3sLoRrz

utEAij7uY8RYM2Nw/2223ZouMegOlK9VX/Gc5isG4T
xxzlVE0obasn46paVbIuc4o5GPZjSlhmy6GDoXIWgk0TmMMW3q6t7kUdpmFVrOasYNMBunW7W4FiAVdM

HcTS9munNRimk8gOjtljRsa4GRg3mpT09tovZA6aDFfYr7AOtELAD83XKmXVEB/96jGSAI88+ymakpNK

tad99kGa7wxxvz0Ag2umhNrATFkzGFOk9QEmoGa3fk
+q+NGTTkMcmSFET+tCfbEKYxb0lvCYRhCpLhTzARnRe4R+v5L1LjPSHuyy0hW/IUz8QBHHC3DZDjSlWm

nBqIIggVmaCdxbdX3rLugRYwRXbyv/M2EiQH2+/EvTgtog7EOEP+W2ZBpJ/uAH6U1micynDoh3/olhV1

dEK5xHUS6lNVIs4tv7zzUSFrFzO2ecPT/bdQ8rKoZ6
VKO8IA/cHYWDLJwN7vT3bVXs5X62kkwJGK+DotdUuLtVL094BmIeslAXf12+8o4EVg9a2f5oiTvjqaUf

a4UgL4+UPYWnYBAsSzzY5aYyy2X/+M5aNA3wna/y36cb9hEfOIHaOxWlZ2/EiXmYlLm33fdctR7v4yUn

kmTJtQgm7Xc6DfGZsuPb1RHlaewchLJ+J+veTDJyuc
C7tqlHDZT7iv7f5rN7hXRlwgBLnnU+qnIdpwW17QEFqChvoFO1fVM61v8M1LW9s4El1ao5MYXku7Uk37

3zunCn/PC7XOdElqhswFY5bKt5Qxb0MoC9KG3ukCVaeicfLuPbdeYWiQlza3pSU5jXAYl3jL04RKq5Kk

aeLBqx4YvyLARBybem6yTNirtE5Rja4Ghtsv10/w0j
8FbCnsjdPdJbHiq+ukhmsGkWh/H02eJJXSv39+Xkz0DGri770r1IuNfegnaDvwtlZR1UzgUG1CiKRZ07

0qgfmLV4FfrF12Xc6QyAvTT2xVau8TaOkAfQGBTMAIbXkZjZ58VOkQKX+uZ2H1kWIywPLgcJY95fB+Tb

vrDKNjwXW91CFk1bF4nr09/vahHzhJ3e23ctIsTUlq
1A1HpH+6uys1nfnEqmWV2371IXJFOsDmDxusxebd1p79l8GJbCXJD5ci6beFBKdJ4J+dL9F0k+Ate8eC

pxSyrGtmJ6ADLRdhBgFTSPV8UXqq9zFhIM3K1GggnbWfKXCcuJMRgjk0FOiVI28wICUUw6zu1//2uCXb

T+bkVbyu3Mbrcslhq8wGqzR3g3zc0f9B7Ae8HaXb4a
4fDOUNxgqR3x0qir6R9+VOJCx7KrGDKj9WAr+Yp7gvhxjgN0Q/61b3wGnvdO8OTU+qTvycB87tHIUSAo

DdO7znNFWg6PDIpnyPn9qycZV0ICC0CXtW9gWovGZd/zX3TXa90Gjq18VBt+phakMIi20n1K4KmfnIIy

pSXAwZMErkUzFT1QVmdLykGRR4Ef3WlzXxu1P10jT3
KutrBCMQFbsrEHk+DwSbpyYxSfjOhk2BRhXXya9UKvKT8aZxujvj/vJsObQOt0DYhfSN/3S89Tx2wTL8

mzBpuCe4YYgVbhxl5nS7AcaxnzYdforHZAApDZ7hByg0SeLMmpNHODekRY5EVYEnomGq/eI8sqTwAUY2

CQ1FGVJ5UNfj0w+sgMH0s/RBO5jIwt6FdKUkou0BGc
oNRMM2IK3e4/Iipp9YXlgnCIEljpv8DZWl4f73/YyM1VeJ0HaZDnKva91iqSDTV1Odfxclhemmmac/LY

+OJZMBjjJ8VJleN3+alRnQbilk/XG9iTctOYZ+WAXiSCIhwKrEskv/HO25NCbA+hRanZmMRmQzsK7mJE

Iv6I/stephania+MSrehaiq58sifA7K68E5fGTpAMU14X7Kv
/BSMedvx3i3q3imf9fSoChjWnLpSYyJmJDp45xBrTiop7RLKLIWy60hyVZx+F5fxNrdLDZioofgCTs+A

nhEqQuF5tIypq/7bs8uQb+vLPajAix4JBYyoNTJm/p4fqWBoATLlhbI9HHncSpeAiRo1DkceO8mVNLoR

suqqeWmtf0H2uJs3bulE0GOnUJ+ngCRKLjtFPnMJlW
72ZYPHMYt51OhJlLHQ5uoSbGLb5wrsg/VcOgE7I6sFJO3XxUBLCrUw0gb6N8gmTVKfpkcIz0wpaR5U7P

Ys3eoIzs/4d5TZzrMpxMQOIKLDo+CP/tanTycGbG7OctmuECq64LclfLJ5KTln6OCsth2MXxTrH07FJa

NYKDLeAu60lxIAiNwH7GbJif6+6/ts07u1zvU7L3xu
OeD5OBQB1G9xkJbamDyBXUN2I2kVNhApfq0oC4u191zTeaugC64RCexwJg8ztIyLb0Q0/HUtW46jLbP4

8g+R7B+SpfbgPfD5BqKWHL31F9SaWWYKopDzIF1aEW7JoAZKIrTh0mLs/KlkZ4ble7sywSQK8VPEUB1F

s4c03RcfmeaHgst0jtnVUkn0kMtTxqGcqtf/OGDZSL
pf+dKL1fLbReU/oPe+7D25qyQ5DJlBQHijGZ8pa2NahAjCTwrpwE0FzSgjh1+yqlIu1HxLIbQf+vuVOG

/3qEvCoQhaYorVL8MC3cmbv87VDH5YXifS211ygoYpyjVZMgt7gR0fZz9a/qNqL537ho4S4i9/Z3o6kH

toGF+VXrb/acvL89a4IlKmMsSf/DOomTLXLHEz7R1K
1XtUgdJZGVAhSw7ddU6lyEh5BVQwqfadvapILPqL3St/D/1fYTh7A82qt8G+6nS2hDx7TrTwlUQN1kJp

DxHc7D6usUxd4jF7FvcEDw6aqC0uCwq8cgRrXglTQoIx02HbiNMBTCZfMCB5S48pJlcjLbN/uK0dlAyJ

5YAQPClKFgJqFb3AJqsHLOCiQlKOK1lG4ik+0lBFi5
9aSv/3iFVS7Zt+2oqHlG7y9Cb+/8j2SGAO36wAvCFP7znSD3wZUQiXJycID9IBIigj6lsGtVFZk2IX1d

UAXXmaBDhbN7o05ybXo5wg9rAoP/eeRHoYVHqD20DepT20XO5nTCXbhm93YMlOo+Tl9u+1vK4HtafHY+

hjMDf5FksLAV+Nsxx8EGFIaqH9upLSfhX2IKMExHZx
tEqMPN11uoIu4arnecUi8m7AlP6/r0yMqbWGFGhOJ6tYRmjFsVK/KUy5m3T9n9SFjtOLfYIvJwMSRrgo

7+hhOAAeQpXFzQ4ENI9hSMGMyvTmB3w9TTE/rMIU/yu8d7c3N05yYVkg6a6AY3EgGO4aO19eUslOXdXo

/5BwdPTqcWjSRVmW6NyJSE+rYh6G0Ap52hOWfTc/Kn
7lUhjQgrqKSfJsbQ0AxDxx3qV1ZOQHPK2gUxXiQ4/RZwziXFre8TDukMFHXHIidlM1xr4bs6wKVKKMy+

wi02gQAD3V+bfiHZ66qYY3pUVao+0psIoTkSXfBFUO5D55lfhqsrNCeVYZ4MwIKWJ5D2ZE7UW0fz3E7P

b6Vb0YWt86fRFeGmrvBEMCYE2Oo8haue4v0iyEEmQS
E9KVliTHRYwAx/wpu/rJB5BXbKIbKeBdab3oc/uo7TEY4QObhYmY471flfIykco+iDN8aCrv352mivNa

fzhhYUYpIb22zzznp/zFVARh8huNp13Wj/01mviGPQXoEMjamP7ZxZVoXKjch6oIHKxpKEtQxtOz0rKE

+oYIVf+4QWMx0Jl4N2dcE975cNc6nNUjaV1cQlBe5/
pyUd1Tmbz9ggRfhH0csJM0brJu6UR/RgnQ7n+ghBZ7Rr0n1HuMER1RgPruIR0qoVbifTyJnSEcqi5+w4

bAPN7dR8uLaeD1ER6NOKeLDV6DXV+lvgSW6fq/89dWF9yojFJkE0qJ+r5oSZHLOtXXeN4xva7sw0ryi/

jzgUn2Zuvcr5iQ/sSUGCU+S9+KDndB8S4UtrsMrmat
T4ztZG5xNOVYFS5J9+EqUpBRKSaSdWQrGfEWU8xEppWtUBbwOMvfO3zndf1IEmDHU/GL7HzL2D+cecm+

5lm4UBv/oEzmnvgbY5UdTdfu+9vDk5xgMCmlARgaOtReYEL9Jk0J2jGRvmGeOreQ3pCT0dREKY2howW3

i+6+HsbDoksTYrCSD0s+2n1ENWZ2lce3JsWgV9zLUV
V51UAzu3BM8T3wjtNCGb64gRGUdOsEwFwp+PKr+/VKQA+o1mtQhR1VoQKHM8FdMTCrBJWDrPAgQYatlM

n6DKxIjKt6edEjBIHqY4fS2u0Ln456kC+XnRGV5JTwsIy/QfSOJEDS7ni8+UQKLuQ+zxPH0olTwXHLh2

cCASIovmuEuP0raeEmUb4K8AoZQIicQ/uDcQleShwQ
x4E6ZR42dwIDUprJ+eodej4MOdt782o5dykq69ESMGP+Ueq+6hl9v9byILr52cYsicbdnbEwX/6GE+Ru

PqaVLLd/jf448qTzjun2EQxEI6AMoMTFaBIZIf2SAVXPj4hQPbWvtogNueJLV+i0RfvW5T87NX8UuxVq

Q2i3PwQ+eU8pAz1iUoQJ2S8TYkgTxg8Rv6H6m2ZAqn
Eeajy/5JzfpfestnuHoJLfy5DktnjPaKIentDMUYXz4GQ50y204dyYUbla9z6/BDGeYfznt17hwAFRan

8GxAnJ6Al42mVY1myqUzdYFlFN1bb2yHJsNNjNrVy+oXd563z1t+uELIyZgIbJHCauWVFiSaIFZ9hym1

DnsqHVbjVWII7Z7I+dycRe2jHzBA66A453yJpo0DxK
ZHGkQ+l0TIHee4vdMJ4hKfcXPmxHqD/DSTT3uPgYZR0g+xKebt52Qp3HwY9G8Z4NeC9QKEXtlJtMkRyp

xwDvUMJ4rq+zSqy/SREEousAX6eVochGfpQAD8EEYoryf2C6hbDODKvh4w/frw3173bCMkQ2TpYpUD38

dDvDJIzQnKFo+qQVarxYxzPc1UIBfaigd3kdvip3i+
c0Py16059BnVEpDjY/PjaDFKNhl8LEuT4tqMnfleE1zz2OIKzQwag4lDPHOgfwsMy98KmROLcdClwN/D

ktz8pevq5dFmvemvyDICrgfHCIXf6yVG0teT/d/hsbZ14/OmdP3WTndmNWVJfnH8ltgruCiB1Gs9hfjr

E6AiA4VM4MV+1rj8WyMF9n/jxvB/Bu3mGhH4XM5U+0
Ztp/wZGGt06ryoCgzgfO64JVxKoOawQ3U9gnWduiGNvnjEi7PuBi7cPhHzO2LLnqR6veDpNpmwzT4X1e

afHrdnfq+F2ak30qS9+mpS4tDVt15vPS2STKt7I3t2UXkQrWLjhsI2YgvXtiP69h3zZxQ7iY2xzVaDOz

GPwjFI40hziq7ySd7kAf3a62NG85T8ZHMnt6J1M/bn
lzIIr+4Y4RcWcXnb9GpoNFBood6vg/b+X7hZrbq4CoOf0PsTdL8CFPJ+qk4XZuV7FqELSNBq32Kl18h6

GwnGy5V/eO50bPiElHLx7+u8avayOftLbGmRdMtDSX6Qi2Ad1Lh2CExKedMrXg36IE0xLOyGCvB9WR6T

EQKwj2+8uqhsBbc33J2/w+0Y6tbNLhi+M1C5PpkWUP
fuMAUtiFgAEG8Oqq2anfTrwyDEA/oR8RprnMOyh8ZMbtT6zQl5N4Die/zWaYg4m50l3xbm1AHHPQIX5E

w4y/5fTDOnY0cEGyDhS2nKzqj8aQsQbQgWlDfJw0wGVIQjsVXWTWb3aZtqXeiDD4JGWWaF33w0r3jWKY

poJ9ATXOY37oU3iatiBxSgHMUvhgu0k8D72OATn+Zc
KHnS3KxoSrhdFhutT3VuKRKHxZphx62rZ8wHjqpjoVozAe9nQaSCqgdb1v06SBqqbH84jZHrFpmO/6A4

CA1yLZ/o3pv7ywpnjO8ev3ZLHtXQNCwQuuZAF9o2fnMxuEqEXXzMY1+gvdANx+v71E77Hj58helFYQFp

aGu0eDC6G89UFXq1dRE+ndzJVD0ADuRGvKrOo/03TI
/chs1macE4fu+0Hf7ZKD2phIdlaKBNbp4bhexSsfsYhgdG4jjsdcNv3C29uF1vhU+jhPNg6BkCoVJmvl

Z6GY0Vk+rnNViEBREyVKJMp9vh8Wjt94mXDBGBmuuMHekV9qPbDetLlos3tPUpynN+lmlsVXK1ls8h1U

remqGZfae6ao4T20J8aWp23LvtZbcqOsPW3EGgFZiP
IEzs/PIl3CnY17fZ5Y+GM/1y4IyaBZD6vmOG6XS1pRU49ju0ZpomFIxSm1ZYVEcyDsbzgFqNsNpc5M7j

pIB28rpUvYhB8/HZYssC+3VFW4ynXen1mOo/Do9/5c4QNCQAqEla9tlJbvjB+fHxZ9ByzJA3rPQO0B/E

ajVYPkxKEz271Lhe4GRKomRRKuYyPc+yV91MbDvB1o
CJXk4Ht65eWhkyt3czSJR/owAbOrHxGfHKLZTluljGOqwjLOD+tkh4ZMf7HGsMBdu+Y0REHr+nETU5gy

A18HY7tySi3aO/lWH38pprJqyIHohJm/8IawkNlUdEqBD4Y2hpFUIku+ENdFJ3OUD25wzxD1Io70IJ4u

Frcoyg9+7RCxoCe8Aw16bQen+Y1QTuG6taPW8Gj3vQ
PJxvlQtcfZw4smasmH43Iu6oj2OvjJjOD9sOa+7eWWlFPJjLY0PhEmHWkvu1xlhFnYiNvpB45Jz78kDT

RaoyqrTbVdqMTF9biEDsrNpSP6t/wVvPA1g1ehbkzdzPZgeZ3C7d8AV8wSe1z77McMsgcv53oMbUkV3O

HDIb+MykMfEBVgwW16FqzCLIqTf10XzsN4j++d+U4z
h0J0DhuYS1fI5j5ACo1hLSyL/8E2UR/tMfGGuhp3u64yWIdOY3LYK+U1NLKsxs4/9UpvSxawshSKmqhx

CVg2WpLGGKjvoxuP5wcdRbTe4m2jbg0RGbN7ZzkuK4ysAX8K1lFY7gOPtBIJLZBN9T1dO3+f3El1FK5j

4//heI6Qe7ax3QhkhWpb6XVCrj+EXJp1srjX0ho+0v
62xZid4lLiV3A61+JFYB8+dTRLyCdWxkKAqNRF/VDxaY6c+++GhCndmsxPIIUMkLfV0/55+qZRGQIXbW

67gtmf2chkKpHz9FmG2sLY7FtNX80qjjBWjDzOjAbYMjKWVWAXsC3MxMkdLfzYdGT50H01GVhmlaiIeh

LWoe81a63oLxkM21GaulYNPA/Y0q6JUOlUJByXFyVn
RsRGdxWQ4CrcvPOMee68uFX8zS0DfTTRDZo9dxiFzOyMLthc6NiYScvhbtjvFm6FzxW/Hcr3ZNyLkmeF

eFzW4sYEu4VtSVvTYmUG9W3ZeTHV1EgxnWdINGHxVBmkrq0n7SeUqt6TqopqHb86V2Bz72RpI5cwJhM6

lqeyQJ7Y49P4lEfRKF7OHGiFaDqIx5hHgGzvn6uhQZ
RliRKL4WRim8RytgNdHKXMJRjWdjUnrznVq/lS8rpK6nyL6fGmkFfxHNPqkIU8F5RGjijOfpAEMRkEAc

ngctXwnXSpDMDWO9Ia6+ngWF5Kj6avmdob6BoBdizt1d6WQCdxEgbs1seU9MPD0CjLomdMXG/b/a8Ftr

QqFG+hH5k9xjha3q4RGWJ05p+LrwWKX8v16adYSm27
xWpFadV8ZiJXqZMVjK4sFiy1uhm1hQmupFR2B0bZcwN/MlTJ/gEY9RnlQ21j/TTU5a7o6HIPW4XeSEKF

6Ep/Ckyv46X5hPFgFMaq3AZEs1AhUUpRAgNDvipuUly9jwsc8Y/Z2LkuYGGPl7KWhkH9L5oLR2N+a8CI

Fx8EtrM7i9hgNSX4hjr9BPoTQom4YmI0WASsisJE8X
Mt1g9OBtMnDqIyS2U5rq0PaPhCjqEWdf3BvCy4wavfeVuxJrVdBxDilpqC3BC5aQpp51G2rIAmEBIc4O

9LUivOseiwGZJAYRz1IFXVmqDqg0AjRNgzwkdnlXAqvpo0GEgsLJOtArrljD+D5o23b24aph8zFW4YWP

sEqc1Sn2GLck/W+bCqhFxuxFxsSkq+xJzklRDJ0+cZ
qo9dkz7GpwzjKU+Z+utu/wbRBQC8RWcw97hUpEBKz8A84/H8qAyPmWl7LiCieJPgb0AaZKgUzu1ENSVM

WNTxhvnn0uHdLuUO2Q4OeGejT/Rh4GxHTRBWAYvrCv6Kb3INblfNWFQCBJ0ByoSSq8/Mj9pC1Dp3xsfc

Ibki51bi3RPQnZ3DYlF/keGqX3zKFuQ7aLsiSzprtP
zluIJRnaqwvjbexCGK3UidXmT/Igc7hhIDYG943DskTEQT+LdAeVr7pAR83rN12iqvBS+vEN9hJJQ6RF

R7w2LB7jxfcDXjz++oX/9Ao6ummzoCCiK/tgHicbhHFaIq7gX/yf8Z9JJDFfvfZcQo5YdVInQu2vsC6r

7ibwikyFCRRXbknKB2adzga/US5yVDcHbQcN3JDlFH
ib0dO8SnBcy7MeRIAnG9nXRpLnk+U2I2IPma3GYuKxgfGAVPQlNFctFBmmkHaNK+aNttr8U/YZQZZERG

SlwqptVQWKK6H0mn9ltY7gDqbU1P0XKv7z4pEr3nNKMr5+nlYP5QB+i1+oFEXTYOHDDR3p5OtSKe3gvK

dfyrbPGUqIvbOhsB+EenffRi9LleUuQiRgJ7gAWIJ+
914IruQjbdtglhj6+7+BnE9Tc5JGLj4qAROqMdmLtfCOerh18noOOwvlVMKq9NLPO68cSEKLbW8RSaea

MPKuEpUPAt9bvn+b8JgCZV0nBMm+YyjPnBoc2YrfJBW5XeUESvt98WIKevzV9El55A5aceuf0b6u7uTZ

8JsE4prx6BVnFaegE/2XwPmLqEXXDB04UDRw5KdO+f
TnsA2M/rOeYKTuGYU9u0j/b9gB5EC2aQjWRXV4BmvnVoZ8MQEXQ4NKE5UfnWQiJ89sRBst5mseJi/eWe

9euV190ipF6glPYmGD7Bqou9dFqCWrBZS4CUkcI+XA4W2D1r+2adn5htgxVnoepcEYbzB/KO+BJfQ98M

RMPBN4MeD40mCXjzQk2yvzNP/0KZZ8feG5e5fB80ha
conOGJWiyzwYNt4N0azOjxH/jUk/DH+cFEIiM0oFLx4wKZ7v909yhTuLQEWyFkPNL8qcKMgrpTkysiyR

/965PmWCixO2o87Usz9+VaaXwl1ewAsrksMd9VJiv+CKgeD7qJhzgsziePr4GD8A/Ar3W1J+d0zBHc

P4+sm7q7+mklCo7f1YMDww8FQb8PmgZZp87zXngjeY
3zT8GHbEGr9EtsiAUT9A1FskWHRoC6AXHJeFsqv+4H7YcDrOuMcsc4AdkXZLdGFhCxffA/RQXAK1L1c0

V0fmmaIBvMAQ3xGC05sf7ML5nBFtObQ0WkBlzufsf+C7KdXvuBTB3CSW/RsJnJnj63ZpbAU4t69ZUTJi

drYWrYVkFAe3BWpJ3hFtl8Gg7pRtTKqNCnwuaz2+IJ
ioJuUC06r3O3Ilq8F65z8sTy2WNR1o91ICpz+Psm2xAdTSQzJ/YWAdgVOoXAKJWvuFvNkxC900MUvQJo

hK948zEkBv6AsrpqhVTgfpi9Gjv9PH6nNQbOJ2OFAklBeGo160i9iUW3C4t+nXusfOsYzIJ5VZEmQc2a

ztTl6YWgjiJnRlNXqZLWWLqg5eZFh0OZ86sHcCWL+i
mmV0nQtgk6N6/COVwNvRUYtSbuy7S5KywCjM/qROnqmydeuHCNbEeQ4Vcmzvym0TDPm+dahE4NVAdaZ4

ll2iHHwV29ByjpMat+9j5cjzZDZtf6QanqkHZyX7jA8/EYMjWS56BC4mk86U+A0Ao0YMB3x3ZB/TDNOs

pg5G2AE+oTl3rWfes7q5IZ/vCIasFzhfFIEM5eje/t
TYdtpxk/mmakyyD0JILluz542JvbTe+fPlazcotcu1q1BIDYooDQWmhrc3g0TiF3aZNN3hGvA/gzmvl1

0SB5voYEOVM3gZqIE5JldnchNsAdz82w0JhTmyGUfn0mR2aPY6UDv9x4hTLHDlRPanGxIfpnj23wKy8H

WfzayN+UP9jiFQrIVK13nNJAT30ixFqFgLce5wFbnc
5Cd+XY5q6dMMIhOKbQBaV1wkn+KBAENrs5T1ydo/fvhrUg8GbmsEnlwU89hA+W9b7P+2wWFfgkRs89sG

zb+axPxorL108YlsTf0x/mThgxdDjy4RH/TEwvGNQsiNmv7pFE/aTL1g+b0Ew/mabqj+/PBS8Xr+VT8T

j45bOFRrcm7ibfhC06L+FbaP/Acpi0BaRUNoM5YByp
LELcls5CsyKHAhCJ5wYktNkj4LBvJfCHtEaOmRadUGU7g4WbtJ25y6uHoa+sAIG6JbuVDEaBYA10oEb7

al6+42jms1B2sdiUe1zBS2MC8Cy4luSkVEiQtMzeweWRZNDLiWhqI2AEoZKzJ7HRI1cV0j/M0kDGNbw9

Yc9Ro6ZzxkuM8KMFO89sGIjQo+NM/WeDNB81nyLBM2
El+CgxbDzERLJ9fSuG7p+Vm7RCuWNhR7Q5IXXozKvb3axBTixiy5tvquPeO/xeEjAr2FQXVq4UULS7Mo

aq9BXLByGo3WH23bx+5+q9TDr6ep2q2oIdjKmFUh9Lzh4eXOey+TYV2MMZqmEw9bCb8Z/pDy2AUUWY1E

NIYctwhEHbDR3F3+Pq8iu+qzVELV+DmVH+zb5/fks9
9jDRmi54FHk/WQ8kbfxuHsiX3tnxSzUaAxFWHzgEmF6L5ufglLL4A/+gzoPfJsUHXkRdB8uPbF9TXugb

ckQewYdvwa7elR4zjTCbmx4rE2v8KIcfG6XDmIQpzccwhhOweFF9fASD09X3IK6KF2A21nlEbwdDblga

NZyYNfFGdXPslqfuYcUzW6iaoi3I3uvWdW2FQ6J/wB
uPsDFrVOkPgWobb9wkyR6082QJTcTO84FkbAS0KKAgvoilYb4731LUt2TiSeya+d2H16xEK2x3jk98jg

qpu18I6zA4s6DmZ0Qy645Th/UkJPVpkDxoCC798dw3l4KJJOy+GZ+dnR1EAqkEg4rVO26vqi4hxAuKtY

kcMt/RoPrJwPJwKvdHye4haqrc2BqGnEIYSp65jyCT
9RywI3As9D0e8HKd0WvQBWp5J8uvAI23Vitgt3rDMiZLnNuIX3+ZNmp8kIhyrd+mn9vbLH/+HzE8nwvT

2lNIihXDt6cntz9SXk4I87v2ChRTcubmljPFhzo/kRTnGO+VjQYAvLXXSfsFbzQt5nQt+cqlHIdzl6qJ

ANUkW0S6F8uRSqfa8mDZFjzEvgyvd8Nm+ZDR4NiYZo
o+jUt7poYK71376Ir9XhAbvPyVCoickIiYLZnRoXxxiHzxkKRsbnfdL0RTCxeVAfikuPrtwBjp3vrioe

ZnF+q3Y7S4vEleDoa4HouGIVOtYx2QfLD/7A2yfMVXaPwNXr2FeK6YX8uan/4KY9ludMPztIGTFYpxhO

u2muvYjK1ye+EwmU4zRVZXM/1AmVfjX///7Vcf+TEb
5MxR7BmtEd9maZYm8mUdBSuyjkqeVyLhm9/pQVDKiKkA9E5SZU/MjZIj2njZeMxJ38yKS9w0+TDRih/Y

mYzQxNPCJQR0wJZRCIcZ6mLY/mG0Gpi3C4CpdETS2jmJBLdAC/09nzKFFRMQFuynNQQqrTfcrXUsPQNO

c2rld/gv+81LE1oAlQq/2lhYV5wyUqtwBPTqkfNNNP
Y3xuQczC2Lf1+sNjfce2wjF1Mbir24CBH6ou4jbu6sQPL0fOPvdyU2Iz22/q4qBCnTvOj9+0f5aSsxZO

xqYsbCc+6hg6TZTzJgROtdxeIYX9AfGH5H1RY3dnGialC9O64HnWjtwMJGA2+r9fsUtwEjafYIvYMOhs

kb4qyDxUYcEAQ9tl0sF0Fig35bMbSrvQ/KLCMWJxPe
UaBo1YVcq7qtafkhCwLfKONJg1T7NpmolTtHYVrk5rVVwVGD9LcYBEhrq3MnADp/L9TKikDPM4/v7tYf

42lkrQC9MdnZ/dpcL70Lw9xjy00qrYU+8evFQhRZENxiXhA64MgrQnafhcT5v9enDigx78nw/X5OB1hI

maug9s30tW/4730x0Ez8M3G3+HgdU+/y6ru1yA8p0Y
5yFHp0WLgyLYRIOewN920HHTb1AHDLxtiNCFdPHzt3OU6LV2vUyafm/wMiHDVN2Z/5R1Wy3w+gskgysh

MH0+6losHEywoRlBkdDGELZYEk5RRbW642P0izrQ9HDtApGV6HJylZHd7YY+Ajd6j6YHWY4hAO64/AuH

hyMevg+EsO73349sbJjT6tG4RE1JjNXhni2kzQVo+B
hKo0G9wmpzZ8sG72J3d8Rky48/oc5P5UMcl3k8El9Zpp5BWsv3a0L4tTluaTtRc/0wMyX7wytQTZ+IEw

FhRnrMRVMZxK+DmXo8JvVOuTjxolgGMuh9ZtO3Xc0V/v7gjTVtNHm9lRyyw3BXQxJpssMQxEp4qyS8Vr

ElAoOjmTppYZtUChEauZGz1ma9nz3WKoQ71OEr7+tG
Z5lGSIFNQ6OPfZV+qs+N9SzgheK1MfFivh+oYB0D0bxGJdlfWbP82EUXPykb6gujcqwfuQ07mH2TO/7w

rc4XhyIJG5V+87ofeasPAdF0tFZOhi9BAH84ncrNYmdzvXPvs80wr420UjLgyovEPLhVIAFuV65VmW0Y

nTzV+kXiZWm/y50F5s3VDiR1CBeEs7ggm1oxuuntsJ
0vu5k0LsyWSXnLej73r8G3ZkpMQMmqhxoWDvI2YFPFc3PnfkbTtZWb1fWx1KlZQGe54Veuzn5KQYqxsg

TEugkfKJxKmCCe/B9nx3f7ZtC9zndNu5w/B6DoOoEebuD9l52gIn9cAPXI9zI+jgBUFUccdiq8FcGtTN

8AgCnPKwt6deU6LJFE+Q/dGS1Xwq5LFgJ16NlTOu/N
1G6W8WIeDsLwqpkjQDitksta+kzleqiUbptzH8RvYmVIor+EC2SIoCIdQyKjk6N2m4EhRLwGf/I8pYYs

Xq2H6boOJ9KxZELCICaV4X0lX1l5FM1valZv1G2O7li7jhf6WlbFTG1LC2EnsbD3D6Gtq7LOSY2brzQ5

C5r7jQ5kXQv61pTFrXF6Ccv+0NCuZWu3XQ31U78j+u
jCCC1/Hn6Mqg7N0RxC5dXWotL6BUA52gKRgpkXA/e3gVQdAd5DIaVVsMpErtYoCsIqyOmcCzz8K68mIh

p487AbBxxwshsKt5nNen/E9xGzfDqjFXnTQtW4zXdRKZMS6BnrP8ZxgIDREfur5Gf9iwD0TQ2AVb64fA

nnP8rG5GW7QZ0sIvjqGm6JeDVFVXES4pdgnhe2AGR1
GiXdZpLf0Vjc/WABNmeyISHq/TkO7tbgjj+jWOEKxi9GR8OpQo4m8X8Ag0noFQnogLI0+XunSA3h/0R1

JWVvg0lqAurdsZN9Tg0pLpSkWSxDYyQpq/+c7W8sxCMDTosF6hy4txbKWl5i8ooj6Y1ojoLpgmjI9Hjs

QbGGwj/A/uzElwG/S18zL0hr+pv50YEQYkSJWJvfek
WLOOpnrT3q3VpoCht5QFgWJpLO8TZOIvm+6IkRW9P0GfSIlEnj2XLM8dcwpjd/y8fe/bD+NWoF2WFQ14

Gq9/s0couTWDw9dCHgqSBi+iqAoUCsJoAwpizL1G/+Z9Eg62Kz+eb3AhvmfjWBvjc8UZCP8wYZqqExlh

7dljELdLbSBtam73wyGRz5L5q6Nx/pzmLq0LPdNUhL
FxpOD4npSLl1+l7WzS2/hzPV+22/RrnPQoo2C2zCX6daF4ZaU/OyNYnImZ3CQl2j4rgdmngJsAXnoh82

GX95kKGq232TAeR15H/3YyURUNP5HRSXDVIud7faSj2kz1zY67nvh5VJMZapq9YLzi+SpnYFZTr8KRfr

ZXtQ+AgRIsVTFhuSBO1wC5bIIJb1IUxPuQbAlU9y9I
B88kKU34wRZZ5jrU4w/l4Cpa1OFusHuSDK4RqBqKBht2UF9ZeVKMsHrLLJcvKX19imvwj76lhpzc9R7u

cwj5ODmoxk9FJQmMff+Y81rbnlySVJaq06oItfId2FBPUvh+iIpV3vVd7dd4f7SuUxtzHYUEVY25ZOkQ

dQzaLzRLb42ua0e0JOxhoHEJ+XV3fpF1soRswsqb03
R/hhQoAF9pZr3vG8/buTRLjoMmOJIGK1YsD042i9JBIoSCrHupX3cmRz+CMf5tWDc3YzuEXyGw+DyFb9

COHsge4B8lzvR4PuNE2L0rw1GrO1IRNO20DkWRTD1NwiIrVaGPsCi2P/X4GqkHNps2Obt3U2mvkNIQCd

IpPBPj2ZBqIhXQ8ClUYg9Emlalgq83vqs3qMrbH74g
kbrLBICDkkNH6R3ht+nyTgukaAjz9u6BISR/C3Uf0fjba66Blvkx8YjebV36dwdJJ0VCpXJm6SBIL1db

T2UxCyF0SSteE4Sp8lghv97Q4LzSPtKkak2iehrCcQ+XqW6/Vnxrvg4Ck8598mW2Kibmp5hyL1R02ebR

31pi/sMpsq6FsckJX/0A5iQNjybSu+l1T/GJIqa9w1
sOC6YwArwDKM8fz3b8YbYgi2gkeAh71XLVBaG54T/+CBiZopKJ7lNFVAEQPC/LH9FjSmX0xrfQBdsLdm

wOjrkpkO402ZoL3E4jEaW6Jqg9fFOfpqtM/Tn+1fILz7aLaEW78JSI+O8m1Wux9Ft9pMtRYE3MKdNSPg

3umlp17NrbLs5n4Zky4QXy+JdK6OtDV81p47KuBjJZ
T5rBgCk3n9MsFyfaWRl+FG0VoXWCeUW1XVVm39cuyRc+XmPkaH2Rnfp2yO2Nt1f6gnin+RMa/MxNRoKY

qo1mJopR+Jl/w4MpyL0B42NAJJMTwOYWSOGcyxcI5yKYaNX24FQgX5b1Inm9swigrTBBndncKEpoJsp3

lx9IQJT9FzIdem6DmceJghTyO0HRLbECMkKH4vdiQz
W8LU+N0FjS4RYq/4XKvs6NMlJmT3BZ+AafS0OyDvVJcVHKkqto2BZtxQwU7KzdOr8Ev07eVALAihslNq

g+pyjMeIxYnuC3vY0s2uSf5+BUrsB1o4QPWUgnTCouablcmXF2ZgKMMGvYwj2gKJvqoMG95Lth2gvD1O

gN6nB4ERblEmZ2a2r6NsI9VGJ+2g3jfWNBD6WX81Qc
dAIhqO1IUJndZX3ilYK7Ff3wsVjgeDM5EQsNyNcrzTDLRpVdXPqbzkvZbrFl7O1c3Kr+7acj7UhqrT9J

/IHfskT8k43Af0tx/CaGCwUeF0n/Mvi6GsBlZLvCu9DgiPZnA8waC0rnCeiQcInt3lyk8/tqIcYmXqFu

xMhsVy8lGcbZMHxNF+AGC22FlVaI44RAL4G3+O/L26
KPten6qTa/oOxWSvWeLpfBQHk1NSbQc/lNasBUQfYZNFG/dyU6hiPxSQsHB4Nl7B+UzG5t5YzC/Td58r

PqiCWUW7DxUjcLF9Izuuihrck7yy0Rcn6aw2GQm5UtaVDDFgHazeaNUmgZ8/wcc+rOscmcX+H/WhYUtj

p3Mq2M1SxSyYcCQVOkExXhr0UAI1xvwPxI+dJna9P/
c4rqBIoPoVxTuONpfZe0SiYTivnaT5/pOMx78uLaAKSm46OH5xB0lMMJ3CEnxUBZ6KVc7bWm8m1wJ+n/

UXHt+A1StZOQnmqEo7pC5T1qzj/Rg5E7GlZgztcZKWiLbx2b4r+KcFqprDwt9mJF/o2KTIrdc/JHnvcf

gJeyb9gHetpRr36mjkBCb1LE+SLaOO0g83QSChH+Yb
Ww8/3t9AhNajkDwMRX1ziI+LMf3F9k9lxR3hXzBQ7Vmehc77BgP12aqAI3MFdWWZiK7kmO2L7fPql6rb

uF62ux6FxrInLNuQwG328JjCsKfzayZRMdoucOvGEsIHoZry/fE9ng0FTk0Zn1GBj4NAg8/DoVJ/SQrD

bk8dCCDco+3fExDCpG+OiwyyXh/8gjLT7h2g4kD7bo
dfDudqS2xzID5+eWkdPJZZ1xQyATQiAMePARsuzEV9cxd+NMCAUXS1ATc+Q/nZwJWy6t2Q9drEIkvbyM

y1ysUVO7A7SPJ4LJ7MQ/1vaNM+nOi9hdeQ3RmR4f8V/yQ5r7M1jdK06QFdJDOx07tUQtrjMKGraBp332

98n9w/X0golgMb7EyMSIR8RgztVMpsG9M4ySq3qkBE
GFbKBT2QU9/GR2yjf1NPMRb/87mwp+sShvhYgz2hqhYo3MnQOX6efhDiAjLagEqTeYgogcBnynjDob4j

WL22K2ElOWTU0UcvGk+ABcC4ruiJhbQvLuLbMnOnLe2KPTNkNrUBelB3h9ZY4ZKg4w+8bcekb4pnhON/

VdgQb2yVGEoZ2jtNgzyU6rlMWAuyozwLqsTuICW7wi
qcYtotIfah1dtDdun+MxrzYzoW6ceWTFeR4qIqGZPyvN4guvK2S2gxg49r8wjLtffMyBfsrA+s2SnS1I

yuLU8sPr4+Iiejb3/aswue4C7zvPNhuDfFzguTQUWSNtTtlt3FI51Yuzn3lQ9g8Eir4m56QSFb7pIR24

Th1/BEmt7ILbf1aOsY13np2Ara1gSHFmJ8f3+4T/Ok
2idBuXV8Fn19ySyd9FGFjAhRBpFuYy2c2x2uR9WwLyFjtIq75ofYOVjLTkntw8me9Oh0L8PMoYqgMqrp

Cd/iVPgcKJ8gbZ9XxbuBf7rrMC3t/pM2oTNZ5DLq2f/x/Pj8ftYHyptQrzDIKDh/SIqTBj4bCumSLbaZ

Po0r/lAAE4AXRvQiy4n/8UNYBsUf1hgYa7ao+kXuBI
N0rP/Hn17mX1aM2tKK/UWvIHl7Z8aZ13qWN6XloyZ71Oxx54kYqMmAE8W4bAMTeNdiI4bVR6sxd+NZOy

/oYtDY2sVd5a+kYcyoNoDr0HvbRriXaV+o3peeqkEGHzXdW/Um8F46lUSAUJQxH+p8q4JujL0bI5vR7k

Fartx6fqKD7gyMxQbMtKi/nUnKfbbw1VM1vvNy7Nbm
j1u3B/EfcGtDCKpXLt/1zEc1EG/kUALlKxJn8zCqyIh8lCGTj1gqI1nXn2NFBQGOvqmd9VK4mpuKwHgN

GV777polc8mdroJgAm+GpiI3c5t4EPTRuHiOndOqRFy4IOQtksxhNe9/Zd2tY5zLXDRSWH4owlI72QOD

5AE4XsZPKg/mnDUO8C+4eIrGDqy3dlo7flKM/KYN96
eLpqtPLGeWvWz8GeCsCIIuKT8T+TvlwxGxxgFSM2mx+TqtVfjcL5p/QItxe4srRTdFPMqXTlZzkzilH4

uGJhI6I+EnCkAkZ3kx7Di7Iyym2/bBW/ABHISHEK/5Mj9fpXWVZhUxwNSuY+CJMTc1+ixYBE8P1NhlkcCeweCo

FD234pkbJ2iNvegX08YRVYLjb9vtJI+BYitqzl5Fii
NddjmldsKFC11LXbgYuNl33nIyB3hrYubXFSjbfc3Rmkq4UkkCOH5Pdxf3vsifgblzg79SxeuJQqzjs1

5AKeXS5k+faeDwCM4/gwfWXk7a0q02nPQXIPixemG0zcJ0SWggLH1d9HZjU3sAPEgSfA2iekxxT25a47

XwKPZSReYCeYgn4VMvkkFBnzRMhWR/9i/GytHiDw6/
BUhdlTpH0kdPeUI9UE+8LVHc4pNfFlcAvXYnPo4k9t0N41PMX1bVOyeMpaCXBUmXJd8sAaWO6TeUPB0R

otyXa3ceVBA9Iqw71netHTEi8InyRJfzs0WcvCEhwMQUrW7asUQNO64VV7myE9dut+c8D/+ku2+/9D38

qBNEYD4GVhprZp9GnRN/hIJrgFJKRFwXs/eeQMnPe7
+JkM8P0dXdYVd1W52gKWaNAZod8aGmV+iIFpUQQoGvFJ8nP8A2gqeJhnkJomfEWS/NhPr/PtgLMxC0fq

fYmCz6+bCzjcaeiZrHJbhQu16F8pAODTA/CesPuEEgQzp2nwgEAhLDfMgX5djROYnz1lQ0dNR6RfQ3Ny

wwWgjkH45DnfBMCd9RCSobLr9SM9pZ9DlID91je/35
Byi6IvL4Aq/QcOdD3OhJDZOyWPfCKwChRA5ey6Cf9buA21RNyEQOXV7gYttg+FtIAmxDZ2WOPzorC6dZ

DpHhq5Kh/4e+iy5tlIjUnQgKQrck7Ehfttfh6T0O3v94jqDKrd1QVgyyW0hqrsaZ7biL2iiNRBTCrpdf

C2eC4AnYtL9/IyuVjlepxLa7wkkhjUIbUg8+6oRUaG
cfPIOiX5QOwoOCHprJ+Bjnj4aHqfpS6sDNw41HoZgIAh+J2OtVn1i9k22p8Nk77pwmbbcds84Ia3cZ7A

sU1KzVo0aRnpgy7hiY1GA2MhR/kB+VTPdBsZWrNXhguGHUuMC6jRbeSS/8PxCvj7FihHEi/HZ3b/7bNO

nCp+b6ERf/a5E+fQMNfc4ZX/ipq9H2+13QlUt1UtQU
DWnfNv1bmbBsBH1XnMQtAcdozYCIhk6P4/a561bWdJ+lxG496Frp5LxC+P+1bIXE12OCbfzpANp39yV3

cED9KIJA+Z2Sb0/rqZ+wjleJ7ER7arh2eAqR6PVY3fOqbmrujbiZ1eg2hH5HDxEOfi+tgJnTy/jb1dSK

DGiq6DQMmgfQaD3iIHE0gM8yafFyXBKfQeT8uqe50F
2epfPw7BovMdHw/MZ3FjcAXO1xGkmzGZ4kkUKogYzhLE1FFEVvJMfqsxlUaCG1t/vZgCto0cthI8WVIJ

mWgxwEodtY6eY8kQTUL/+MsVOkY5GTXHYlWvOBaldXlfMykWrZYacQHLHcSowdVfoZV3QEAjVQLdC4fh

HkO/cqCDfC+BqTnD7gW/Dp40iilq2i+oSzjj+2bP2R
fW/WDe9shcVW1bm9DIAbjNiW6CsAcKIfxpl/m2xesdhBCAMb9j+wF4qloCjKB9N4UC9G2HvpRPPYJBD1

G6zLvaknah/9RzOB8d/ZAAHEiV36Fzu456I4pUe9ui57yWyNQOcGieVnsvt5ym3Uvs4+r5AUWOcBp5Mc

WlryA9Le+9CMqVSz/8e1JJ8apRchy0vd5lThWgwFNh
t1aJXQezmlljBqDIxoAKbyvyZtRusHhaX/ymmlw8ktGL9ON6/jp9Th3zt2q9CWewZiXI3I7Jt94cHVdB

SflvvDHSY+U1T9lyX14RJaHj0jhw1n7wdR+ALTCC+W47XLCwrXVfScAXA4UwFaeyK+9aT/KSWPtFiChu

zoBjqsaqWpRuxeQqRPvX2Eut38bXgttb8CRdt77HSm
0j/sx37oneur/yXs5DIjI9XEmZLk0wiiNUIp9HsKbL8mKnWPDgBegiqQTpsaGSBCgQ4bDovAI7Cqnu3w

l9050ez934gDoj0qgFg2vp+8f+40gjaR2bd+/zOc+us2LHu1Cu/Z8dgeijGPu+CZBDTq3uwy1+3BCaxl

DFwhkoxcg5xgX53w2NqgrShDt5unmGrTROe/HD96kd
0ALDc1w1sFfxM/OkySvyfgG/S7CsHuFcrVnrJoRDJFnMlSbJTQUx15cq5RVcEawFw+iVRx/HG5TpiI2l

8tt9eu2peufe496VtKc5scLmgjo0tzugUFDGIP61HQtZxoLKKTPsIsTA0uVa9j0XoCW1pH9xlnFwn86F

6og5A3vcT7W5vHrBldb30zl001QVRIZ6X85TS/VMxM
uJsw3K++HChpFLnsd22ibIrBu+8lN62/KwHqI25ZHXZS4XTZuuiv4p80lTCREdr8wOK4KUvorKJAlqcE

9VjWG05anCkCdvDd2CnNfzLTxjzMtlIJJYG4+dDvhNK04iVcoPFYjaXFaCAN+JBT3dcGuOv9X8GDX3k1

sOz4qhp4mGY97m9P89qfjFm9jygWW6AeYwMDSY8HP2
jtPvjq9jvsbvU7fJ7greyRgiITR1VBB2yZC8g3YC8liSAku3ETeYx3O7DjBxSL/Ggx7vXtZ/bJ8lJpGP

MH1DFGeHPapxeq+YdoWm+W3a9i71IzkkFtNXthb4/8BGgJ2jXfLbo1UGLogdnQ0t+gtFsWcoUYCL73lO

rq/zhlVn+CCJnbh+iKvHdgsiQRMlq5rFcjKX1z1IHN
cb2TvDLZGuWX7pZPb/5NY/cVyWaRWqxNTcxBOhQIjwpbn+AkFUGIY22Kcu7Ybp7ytxsrbEzWGtlE7zye

5L30+hi70xUbuirnAI6rSSxUB+JnhBp03FoNECch2DtiP6Bz/aXadXwOh24afVcqcAas5K7SFm+r6HZb

N/4hLek21f40fB/lGh1YjP3VaqqX6vhX2aHJ3xSSbl
a96fouUnfdHKmYM317c+V+8IY5rFF3i584Digil3hBA0lt8hHPY5Ij543D/6Y5uNh81xArD481B/jNcL

KirSJ/cgIZADF8hstu2DAmpU6JAZJpl+Maj8yvwX62BCQ+CYZ2MSngwbk2b3eUcYNRihTXDXThnUssL2

dpmGjDOTbT5Uaab9/5dTMyVV25d/zK3RUQqPI6EhEu
xO2sX8PvsHYROWU7pgKL+4RQXIV2pS3haJgLcTLh8wq/QSySaljP2Cn3XN3lnwzN8w9eGWo3oUSBE7Yc

Io86MYLAQHKQlr8lNusS2ieeVdWUMjz4G64dDzkT2yZaMI9Cs6nxp2TDBPDpAHPCx33Zob3JmE1rHJam

tfKeG6ocMO6YlJpVz71GtNZkLwhOEmoOwhDzq+lNMl
tes02HKZDbPGsXIjTBApYGbFCTOH30gpKtjax/cLK+bcF3N1NSqY0FBR0Bs82UCBmFZGmCuQ7qZDmfBC

I1CxqeuhRhObqp8DPSA+LLWlohvY9s0vLVoW+mYYvRDjM8FM3b/6fZpVmz6JqDLeOizOlBud5Kqze+ZS

bmpepi+aOtzGpvvcdmmlFuHZEeisodIx/C4Jl9n8Zi
T8pkDoj3+ws47qDjt03pgJV+JeWqD4mMaoB2vNYByyubAKUf21vC9xMJ61YdXNkATJhmvParx+3zP3X6

WJOXxs/pq7VzVwv0YKYwcbbepNjdTP3l/JlXnNsjOpPxX3qdAP/FjdDfrpZu/ilogfYedA6uU0lCpehM

5RaJVu/yI50sTsvaNjLT8MDeehe/ysc/jSdnTGLW5+
1WQnsXpkWB54aTIXP25JtzGq572O9SdLhtiTn4VduxY9LencF1RMUZQ/FF6u3mVY/XkksIAcVU5jIoxP

zgh6aUPBI9VOAncYigGolpLT7IyQSVkztItSgkpxxBVtvRs+uyjmBJTmBKXA5ONlDORr+mj6BGit8OJm

d7pKC0abt2Mt05nBClZQpHXrm01FzHO4N0gFZn579G
Lj6vEC1TTr1N7VcAn8mUmuWSeoBTu3BM4vA/q34067S9oNY2GmSSXAstjbMzBn9IkE1OVdTLgWUq00jm

EWlo6i7aI9GHDkgsIUEUdKprL5bTVb02chw0ULt1DhngU+zgD2uH2+pWBfVsq1VVwIFW20Nli5HnwGHN

i6TkSLIExsjxLPxlZ6G0l+aIc22DS73/UrOIaJAy5R
cwm83hugcQMl+UbDc7G9nHttYXqO1Y90avahhYH2jLrkS5WPm9N5mYXe95G53NdrdAXhVu4bz9Qgh5AU

4MEcp35vPVv+qOiV4hpyJuHfkvkK28oNGS9p0nKtpxPX7iT2hdsXzZ0f3B7NGV2KFg+Ye2n4w28xIEyL

50ILa9NW4+gV5NEgDigKEuDR9CQ45/BtHvLdJL/33q
Au85cKwfLGrtq+WMSVDeGWt0WK92dalRnpl5SoUQmTlKU5qhopahX/OpDJt/YINidO5PGKUseZbvk5mV

DbTWT+nVaMdvT3mBq5Mev/nbXThTaO+mCMo0c/oJEAZHVjoHXfRgnT0C4vtNWMybjTAXr5TDE57YwJw+

3woj55Q68I3UQLTr2hQbJp82Au4lu7W+kK2F39hgJo
UR4jHklC/0TtDv4/zwDdqtMED3cpFUm5EfAgetAu4ORSvKEDqLz4eeqM/X8ThoSutBWDQZONCedR3PDC

TCKRq1YlyNzr7kMqavV33sWwzS8C1eo02xnzZK24wAMrkRACs6/3yBmtbhZ4Xe6ftX8go2DzP2ahGy3L

KT2Nm7T+SaZ6ZOhM9olquGXbwn7wLWkrty1nztGvfP
WDz35JL65F/KoECe5uM6xi98mYYe+fTvk/pgObxqp10K4LxEDMHYDl245xx0DHHk65ahSkprT56BnpVH

aQEWyroSy+QByS25niTlKlpHqUjIy3ubkQzs64nAYH97vBQSrst3F1XQGczU2IGGjtuhZLtoV0GgaN6y

GjyN+xDKEHZtPsc6wYfpVgSqbyrRQhLP1L6rEC+bwN
AumsT4YdIYqWbOwptUA/3hEk70DIADBlBsoj0vSanXMMgrgUZojIHVud84uCUuo9dMW3w2hc6QiqvS0Y

twxxWtO2M34MXLzTNwYRDnxQGH8ewhx0Fu8mVs04RExbnxg0LcskEJYrEFguc7fnKK+5irvm+Nsv6Xwk

IR8AznS4icSrn8LRNCaijse3iYpslV8Kh3/Xhlioid
jIqADvVakrBFa61/tCforEvO5jR/EIv9+IVBYfbAQ0EepYNOZbPxRFjY65j6M64AV8v0HdwwsS95yFEx

Z/YuqfRXg6j16R5fjaVIonZ/gSm36AO5OWV8e7Qjl0JNyKVJXZBh5TP5x1O4wMVTMpJLD8Wec6WUTM06

pkkIuXz4EBpujev9DaxscYWa/jTi6hWQZlYqu2qHBC
vVAYszfO/Vyaze/QThoxTDJVSisK1Sk5bBy5PFXQlXtgmPH3LucbUW/62joD+QT1vpR5SRkj4FHkFfeL

NSw9hGLONhnL7jmivjJjE3873W/XOJkhtWEt1ViO+WwqniGqjnkJxNkAWKVP7fvpaqMBRLMD3RxgXaJZ

f5tFvFu8t+1JqmpTrbN/3insxW3VVUXdJfVQMPegXe
G7Rax+0gKWjtzdQSfpP3klY9rjzr2WkczqYpT17tMPkOpoSXDSV4vbP36ZIgweUZsfyeo1v1ZgRguQ2U

lWs8QSLbxaquRnld6eI2fiO/eIM2+3RhqTGWWBj3iifG5Lykl+6M2WIJnPbaGH0cynHAecN0VihSXBUx

loh2aOlVGZ4lZ11x0F+tqyV387OwtaXQjAIfs8e4WU
jALCHddWjKt1zB3e0he7mFdWFXffDUeAegDc0ZebC/zGaI1fCEWdW3UJll6e+gr6EoqjPAKbkNq59hGD

x/hPgXANONZqcO5XNg1SGoVpfkK7PyEMnZMueT2DXj137R2ojoQJkQhaQCQkd4ygraayHg6y6ZHNUR9m

FgRQDHQI004t5yI0nw5+RPg9V7CTxutuv0Flul2UuY
IqcxQfRDC2v5bz2VxSS4/uWKbzfoVGkz7w/NA7vvCkMHOozsswy+5t5FPiB9ZQi+rSBoT9pgJOj6ixa3

rOnheZir99sQ22bfKBAsPVPVKqtjFsRT51iHB0je0Q5O6uyek6BvX+tmOUKAfwUAsVvgYwQB+UHxrKdt

KkftGmdVaj+Qn/ZUs0byCjRCms1naBR92V+asvwALt
23L8ggeDddJP4z/s+6hL25Xcrm8yMSVU38/uGxO/VM1FwRaxnyUPxPGB15E2ElTqbyOqYk+djPl+LM2T

rwr3UTUacGbvujh6F8hfC/HHqkN2md+m43qakRChrWa/0aGPu1MdN0wxw25iZ3eT5jbCaIw9SzgzhqX6

lWAVmLA0Cr1G5i2uDOAVL4wO/t6ZRO5CaTPTkpOt/d
2DlHsvRiszh3VmzlC5tDOBcVTx2I3Ml2eQcYj+/bCLZYtOQK+/p+mxo3HsOqfq5u6SMAcL6JpYfGoy7+

wvrsabXjIuWZ22Zh32jGIb014HpHiJrKh78vxcW/vtyRQyO7pcMO0k97sg5PnsKHR4fvsZReGk3d5rSX

FhsksdKtKCvsJST8AqaQywvc9Z9M6d7FI8VbIhb3jU
FN1SnMncgk+YBfzJ7bA5Ku6bBIVu0SceZSN8ZQj/eYPZFO89buoplVh6X9r9hx30fStlG8XM1/WQ0Any

UF34krAJqWIDKkQ8ooezafueex+pIEiuRmKR5zYeT9uBFCsY/O8m1eqDhTRCQ0T3/khfsnqRbw6RMcq3

MNFU1SXNlr6Pk8PbKOd2Gd3FFC0mZRqNMo56lgoLGG
tbUFcqQDiz/A4wgmo0/IG2tX+ABVje6XUjwmX5M9meYGqXUMicxyeqpBLSfZluCHtW2VVLnNDn7bd38f

YO/pacvt5a58mz9cqTg0U3emxj5cfl1towCu+cElUMFL6w7AKzeePdNcZhXe1RArcD0D6uamnIvTh/80

/735Cnn1g2PM1OjSwd7ZGOAtsG+qOCq1/KKv8gIRVK
mHV2lCOy2VBFJBubXxplQVUeY8LEBh1XhZDyPbQR1AKcOvxSH6AO3OKdLPXXKcslCB6C0319pTvKeh55

syHexP/diSeuAPCh8bOFSuS2LwZAvKpsQ/WioNgQl92wtQm8cf2jhQQYYllkEruHI3d5USDO1JhaT1Ja

ZwQkH2jWZr6qQ0iex7v69GHFYlYd7zCne4hYjH4Nb0
1bfnTmGu8KNWvIReg2vMumnKtH4qOmstbg3SLASm52dqfacj3Vmb2JMSlUOFskEjMDGV19dF+KAd2iyZ

zqufOAC0PhDWe2Q4LGU3Rl4zdfM81iGgio+JqvMthRtPYFK5/Zgi8kfRmCoFv5zHBY6xfYl3uY5zTRrN

kDhnuKFTB//VOeHOvWtzM2sWyB/usm9cAcMPIQbJ17
930bJ8/m4sAk8HbAOOUAmWkpegU5UJR92VBBh/fbJSFsK74SqKrbS+9TXZer2a1JCzYu4qW356VMcTmB

qZ8bIHh0U+H8w/oajk0giBV3lOygrEPAQUD0w102RmeiPHQ1JMOA14bJHUV8HHGlhbmGrUEIn+CSLp58

rT6pHXEilHuADR3LRMhFAt82K3i8g+NkLYFHYUNETN
ElUAKmo+OCTYL39yU1iQvqZpNceRF2umlPk6RjNLu3QT1DjfC53c4LuAiTYt5xSKjJdl09VMQp4EPdek

VK8Si0LFoAjDCGW/AHd3kidZMYxmHKkm6W+tw8atouin7SdjqJ5RI6UPccSDVykk/3H8OJI3wF+rcS7k

qisP7dAg3enVdtxpClLeYtR8ew8OYF0hjNwpOJ+EaM
ollM0OdMyMtO0LTxYkYOvF068ESRzkd6stMg4eznxhbYC7g7P7V0ueRtWCFGAqyW4skNH+bvB8j/2x2U

/9tNV4n9uZK6Z7pssTuKNNaXf1JKYx3tcd9QTci8PVEd9u1lDWBCe2osT1TpLjs4om+CxcebosFF7WBc

5QCxbmwgn6NJ7Ha+8Qwt5G7BhxDpd+YSqEj/FNSntb
E5WLiGY1t1RntiCJPR2h5GPxG6ZNZiGyCzkx+o0Xl1iFZNjng/8iLYFMuEVHfc1Dv1bQLwB0K0I5rOON

aV4K4C1e2gqen+dFjPl/MfmsJo2nLo1kb6O5LPOLkwl0E/S3D5TfPNBNSkAX0RAyzD1jhosSbWgqANVm

tVTXwxI/aHZW/qyCSv3EsCaAe9sGXoJDGgQeBXy9Gi
kKcd91VIIh3VvxjAYZCtJmpecB+g8Oz2ZwNmNBJRkiitfEM1lJ8aCO8tAD/O7mGQStplohXN0r+b8Aq9

7cNSFI4T84fOyiNB3av3r7ZwXnzaF5rjcve8jaQKfRh/aPWJwDYU6ePcbZMbnnsKYs45Tam+/+Qj0CTq

+9qfrQ/Th2LA3HGp5KUCEqsuCPJNaY4zDVykiM265+
e5El5U4//acYs5VqYPL7bHGBNmFIjcm3q3bhm47QR57A9VTHL0FbeNURTIm5PWTFhV2K48xfD/CqMset

Fd7aQ+C+N2MHoqi0z51KibM+lSLciZNw4J0Bu1pCNDWroz3u2IOhmuwd4fOp9P3B9GLKYHHEQ3JJYokY

I9dQcLuZP7ii16JkM0e3m/aB6DBEcB1USg9byfRzx2
q4A9BadF7ltYXuQKD8EvMGsuZH5VSzvxr//cC9MG4GtyWRgmrfnk93kh4+zJWyDZ4A6u7XIxbkNeDjeS

rPT0APWG8Kyab9X3Pwn1NeCv4wckIjQd8X3Tnl2YE8XTyxQ+MchXXUed1PpAsP6fpJ7Y3FiAuS7ISTZs

jBJq/kYcRtB1F5YKHqfn+5zviSbrkxcpwtEwPnQCr7
wwSyc5MO/LKyFlkO6Me25GVVc4B3wqNLD3zTVkHKTeHXLPbbY59s+7scfljD1eqHMvEH0LsVgQMAfK93

sbWTbAIH0FgbuSN8T6egwCOE0+0eLbQX5olxVv4rUHMKb3EqO7j6YCECTfgd3pUfXEGyAnP2pJHNaD34

6O+v6dnxuzHSDvJfLpjBbA8rN56Go1wn27k6e7mh4P
RmXNrIMlY0hqqcsJF9cpDpy7FLpxz7YOvBnKFl0LkO49v3wU9PHa+jl9+fLyqZ2d52kKyhdjCbxKTAxT

rxGG24f+YDZYfskSr3FdyM95UGAbnXX78rGfVc4dOLMX9oi0ReefZSs/SvLY37S7O3bYt4JUiTPVZaMI

XeXguyo3EOjeVxyW6xT147dYaCml6bZpAP7ShB9Lp0
99uQXY2sElVVbUqVCt/nBhqupHYFfuSr57q9C4Gt8iNo/LCeMguuT5LmD7iGW8k6iVYyDENGAYERCNcC

t743Fq0yK9RaGyaiMlHST5t+wOXS4nLn8QDeDKu7Wmp06TdM523O8m3lczZEzfTRPse5ZFV0GO4wbZ0y

MxD6vcEbmWKUo5iItt49r1Mnb7EiTKTIUR4Q23hScM
4jjOsseEA1s0ktDJz/r0xmriDjMwPNR/o+KkyqUhRk0+XHYR8OjoiujPJNb3teRng7FvqWlKUMGk9u4p

jX99SHDhLkaKntLLDk2jDqaq0lq9xyGwcU6vHgP4iaZE6udKL+Z27M6McYpEDhpSBNun2atPh01vA426

+KzHgpS6D2I5fg5zSUbyTkxtXvAmstg0vVD6eFrocy
hyrhAzyZNyrY9IaDAo/eGjE8cqG/CmWWfd4slmTBfNEGrjYY7JL74/kYPrpJbtPfxCfXWW672nLmQC5U

dgUpwXowqolw20Q8mbgEE8simduzhSm0UW/WctJC2ydyeOG1KD9UdUXYRta0PQ4fekb000dl43K5Okc8

ue65ewzhbmO9h4r+XiSYL8I1+KKYwVs10FNmJGVdMu
E6+vUWBF4Im3qYwjfItY5mxQAz/6PpR56lpFik0Iz+LgWPdE7ZWZ2KpB9qZtY0Kty9p4zwF03LzYgA2h

SH6GQRnFUSeKHT8gzzyZiFQXq2zZnM989QLgciAjLEa2ZDXrmwwiBOjtIkFTCoFBukKuTYlyD/WvfaU7

POOlLJ9z+kzsYlsSug6IqhPxyicGglmfu8oaIl89hy
vjEzgRyUFN0f9R57CrpeHEmxl4MsoQNzIdsgqDukEm9CLdUszzbT9w0Cf0MBWqL8EaNF5BIFrdl3GByY

9fO3vKAEbabPi3e8Of1qngbwRgYHXge8X8th63LRbhu+S/1ZWYT9W+0oiIpQu/Tru3azfyUy7pDzLn4A

8Re7ODzHHF7zdOkpLVAe7X4BC3yOnAwoaf4bZGZ9Lc
IU9FBJi0Txww7SQvlNgaIaBjK+34bxB5GwVFwb4FKDulDlEQYcBYyE0CZ3FLGABSV876OACkiNP33AqP

TAiMZZbSetV1b/d+lOKg1ziMO9hdedHg+M2s0j/y2mcnrCu3errwJDKrmwXP1e9hj+HKCIKVo0mPo19e

5xi3bfVezZq3LgWb9n4IOaClL8l+Z/3aHYzFv0Anb7
cyooN1MbU1Db699i91YItXqG8RGOtjsCiXOqn4Xjg5aQ0xMs0HyNM/NCratloVjMNEPqbNVK7OBOpTmu

FTxedxCl0lCKAf6ErURoP4OlmTbNo3lqQpRom6SQshhI3g4iMyTtDc7igD+lkfp7h0QPWmc6U4OfE0Xa

V/WWaMUfAyo03MqM49Z+2kjwvzesmd2LPRWjHYoT5Q
8xWjlZY45wgR1i+2+84we3yJkPZC8il2tzBgjZBsvp4cO5o58Xhmss1BEDyL8uasyr606nQnTxH4on8m

H9s5+xrGa1qWAkmP6c+ZXrI7n4H8BN+XpSI44QuSlb4BAAo2bH2JFM3uooKWqPghu3ThxBsAsfMSYb9h

dLOwmg/kHKaroWTgfi4xHKU0k0JPlk4p1z0tczDPeg
CnlP6klwrz0rMum21yok4qnA4diWtkA65V59KQpY8fQ6jGQ4zKpvviGXtEvB7QiNhkKbwBzX8pG8FKnA

xfQ44FR5IS0QFWMbRO62jxtkwNZI6E49dJuoTyG6hlAtorm9aqTrLJ5WRvmatvbyQCm/yFFtJEdPAJ12

6K4323dhE4e2DVGU11Jvc9ieYuZOUeqmPNAwxrayQy
SBIGn/nEmtiHkjmOHIYsppukyFhB17S+Nr9z7K7FUE86jJRSvTwMJyhStYsjvxv+Psc+HelmoZusnpoG

j7hlKW5QKXmiIzpwqf4BU2rp04TI8zYSMgKeW1cGrO335fsSrdANtq2sL1cXh89WW+wZ3d48t4UykNNI

oO4AYiv8XcC3270t+TVHAa/eE9y2EbuWgKKBm71hFr
MO0iAnaiIm5r0Z3MKnaYmVR7zHxI2/KkMrYsiCPzE/vuzpEd4w2jNw4g9zsg+RSgqToJOpn1SL6bKUnf

Ni7+I3n5o22gzbQc62KrIDPrehk5c25Z7H5f1K8dwL1ru2bYJq8cziq0wyxhpjGcwGW0CfO4KMEX6NUm

3Hd5iI4es26D4K93ktgkc6kW6c6imncYNrdn/n3Smm
6Ztf/kLCpHZ57AqqH89Qh1XXGJus7uDjqy3ECC+viDZXA7jqYzEoidX6w7sdBWddNBCVsaekLbmuXrHc

UrZK0OLf0pR1pN0FFlI0ED4sFjd9Z+Rzj9xysenvfelw9c5mvlB34HmtHpQlfGsZafPhQXa7MecuQw0b

z9795WDTwxCbVqqsyFGTh64yd47Rkhkjs4Tal2G140
ZEyJqrstWfgUDw7wDIRmxrCuBgS1yS1IuA9xB92ZEJ0V8JTs3u4482drk/qlY/5rAABeTUUbUcB2YwHo

vIL10UVbtvX7vZLsbysxf7mznPSuZePY0viCNp89P+PWq4COE7+1f4ti6TKzr/LpJoXERCjX2kbjXYH1

ckmDUconjJUf5NzZgDxocEYJHmXor2U0DqQNIDSrYI
CJTjs1KT3A0l8SSAt2aR0SvHN1Ah6zse8oBalFX8LZ9aUYAbyQsLbuju9G4TLiucYmc7tXPj2nGuAa7V

MV/Yk4XNFbEKy978GsPDcuswCPWfi05HbsEhwn5PRilW5rSyt64C4TKQMa3j2kN7VWbTlv/gPCjNi8h+

S/xOirdlqBZ+PCZqgelojHTFHl2pQn4xSXDBRTEWy2
68LaZJJIMPYC3KnFG3ZvilZJkb0L0+il+EGqGXamwCZjvKwMgL3wayoeS9tucVYdAHqeCQoC7DpPgGDi

8ZitBHIIvrkDUil5wPAnl3toPgEFwfZpZ2+SGJvF/Y2RzLQw8A2x2VYuiN4B65d7WrciOSZOFYM5o8U0

6OF/LSSnUCPeVXyW+k+2qSwGt6DWDaVw9NOrO99rhL
Rn5GGFcYSjMkzar1VioaxDISZZoQK6IYm3EarSi64CrBjN0h78TjBHmDw6+2Iuf5FymyglyIA7tFs7jv

JUplkPEE6bTR3QZNoudhFNUlxCKmOigUniA/xv38xdMT1Lpt7EYMBqE0FXiFFjjc/QweUeZoooOaFGFz

snsZMp/0mW3OMBlXHiQEfVZiCVfwpbwidnf1PDMWCI
m/Zcb+u1mwseYOiBNqhTtSXOJpb/2Car34GwY8DqO+DNVlols6I990OnAVa7OqfndZMPU4V+j7mhMQTj

tv6Bphe+MGnGF0UIpThSYER+VwcRgZQNbvG0YvDq+M49a0HFemcP3BkzCYBBiDQcId9nBwi7nxMGUqy8

xGn0t/UDKO3s+OTyAuJgr6zZT2o1Ovg1t7OdidRIdO
p9/J+Tzgli+4RSG/66J4/FH+UvP4Jlxy+GzyUIC4/7K+1cb4vUK/gMCFmJbSV3132L9FFWmcS6OV35vx

Oy4g9Zi9ogcvbmdiBf8BfC4OpEU+HxdQYoEhkx2rcnKBuP4DF/kYZajZ+UM64pLAF8h4ytocLyRyfedw

+No9GX5DuarCULHN7lwB70O60pGtH5+8qtF1VfDtYy
Kq0pEwNGUWQGJxrv+3F7by9LQEswaNOCiIWbE+Tu92La2TUEtuPeO6i4s5aac1O3Tr1VK0xwNlOgz+Za

oMrBsAwvPI7VWA+fIxIajfWvYbMh8fEqm9FvZNi3/rBEbODTV1itymx8jNMJyJV+lTDPI0bw4LimaP0P

id2cfQ00jddK78ZtvqWNgnuyaimupe4nIcqplcb7/R
0TIKpg3CaepuSf7CQDa9ADxI4qS2ftEcZTAcUAxlCYiF4PFCQ4s6kSKlwJCZcew+xf6ooWXoZLqlW14t

oPGSF05K/D+txvB/Sfun9iVwOSdJ03RZfnykVQOUrmixigYeMvsLraLPzI3v7oulXix2c2lGGTWGIz76

TF7M04+OKpx2qQQ/atRmpkQWa6OH91692FcCIP0nKm
VQ2WYYJVbJAefY+Zo8H0HPWrjOJhF40BrX3aM2T3aLTt7lo/C7v/WqkQjny582f3H2TAwbdIen8rblHf

2yhL3ilQaas8LivD3OF32iIIlmCY9Dcl8CvHiheT4EUhrlqhxMHCZWXWP3MVIVJAH5HFvZFb6PxSD+nA

WThHv6ZHETc7kVtui46pX2JgqxXmefdnNoUZ9nHlco
lXSr1nm5ggnr9zDuGyVy65KTTqwtLKK2JWRI0LkeR4lRNjv8t1jwq2XfPKXWzgvMI59AFLft9shbpgbA

7Fq6QlkH/ovFJIXp7G3YMfmKutLdguY3oYIv8NW7HeR8faacdLzaMeNcjYvKUhzwqGbJa2mdhf+/VBoZ

q+jHLr0HHI4pjCUL/iwL1Kw5wVP2ZA1Ut/8OrqCme8
+wkO4Oz8uGgkbLbB8Oyuxk11Rpbw0TZHmyl6akzE8Ux+J6xjy+X5hd45w+7x1fN1+QrSnkRyBH4Mwpzl

W12ctCrawPN7N8Nqe+JqMI3g00RPfVEyQqlW6taTEBahyqeQrbIWhtDYkT0a2pssmJ+YuZZbdyeoya/x

GvQENjxfQsh7FS4KBGvVpmZbQoz8cUwgoafGmgTGKt
4acyGPXrsyfK4C04/TNx2foDoYl9yhMdcmIS0j9ev77snN7IQU52d0cRF/CctBgDY54VaXBdIxcHYFq3

g4I/qXv9wb1UjKqy/1UspaIrs/DPxHmp19nZCu3xKEWHOpMXuaCknHyJZR6m36C2/7TGRpFfuq4YM3p5

HaAXw7Y+b8egpUoLuCuWSKMdpe5DZe01+1AW8d0UKH
LdBRHSAsbhBzhZt6UwBWWEBIovhO2q+rKV0s9hih5Or0dHQ818DDegP5N7HNf4C9/9KNAFTu4gd6bl6M

GcDiAfgg4qXfWI/+t0p7egIGOGO5lkvKbLmTAlA7g67Au3W1NZ59rzdih+F6uKEb/7Wf/Otrv0xjaMb4

77vQ9PSRPDEvLZR7LcDhO/NuM3pCF7monyh3YYmvV5
hZA4nAxevRl6GKf8waTASD8Di2++CEokfuxjFv/Jss9mXxnDg+xT1QSqNnFlwy2CSdTYza73Kh04Xs9U

qwfvOrwXzlmRBXegsFr5Pf80ipW+8jbU3Ivz9zXpe1ZQfQo36xF8mvp40N2mJMuTvL3f40dZSDOcIbZI

+c+sP5Y3na3kX215zdjkRrPgX2tRk2u0B4Jk3uwJZx
xICN1ey7nREokANM0JFghKakJw84+Lfqn+ciB2GQUlhpq+ndEJHx+DWl7oWFJ1Pvl3yuyo0I9MBIPZdk

KfVTy/rPkA6qaThbTOloi56r89ixzyn2ismcunzYlHPXBY3Cxo+FQqBkkrbrWDDLStsqEg/QocyAOpi/

psm9At+ZPwuUEVpYzDDxKXUKRf+Xs7ZCI9ou3DeLMt
3OmBggBKjzxoRpYb2rh+JLH7N9NgJb8QdZHRdFQ/vgql9sGdJD9m1EbLnZ11Bw72XIXF8jUUO8yj2NSL

KWBr9QhgQGAMmEFeXlQa1C48+e8aTPOBAAYf7e5z2rMhU+4RrV+89tEyER6hE3j5ShYmpPg0yPv8k1T0

PtdUrrNh15En8aOTCrGGI7rUKZ9sCbk895T2l3blOe
UJQvPdpmTYjTbIw+Y772UPbYw6yd0sCpFTWVKIffE4CcIA9o0eqggjwkoRycoTczC1FK98Y6cIyAwa3e

SYuw9FW1uFSLBV7HJje4HgQ6UfE0RKU0r7UH1NiRJyphguNXyHeKWQrJwDbnEExl1I7hFGhZUJa9yP53

lNY9C6iTLFtA5QDTVDHXdLcM4anBwfCj3b3rvOwPdi
gfnbh+jLH3qQGNaYF9R7WVj+4zL0DzNCifOQ2DWwPW3UkE8YSdZ7NnZDQyEoQugxtfKO+txDgiin8DRG

fzY6B+ia6Rrz8wAk8lYQZIuvsuCylFb9Y4HtK3txjEfrqVaLRM6La46GGxpFMclgZ+ZjuTF66IB8siu9

xr6w9svPHaL80g2EmF562oH+VsybsJZilGzwNeC+8C
CoLwqxOhdw8YEIquFIqsIju296lD6NpI2uEngddN2z9oibqIiCWRagXmvt9IO6xi/v3VmCv0akzke2JM

JYie8brUBgvCQHNDDw11J2SCD01Rv3pOCMZ7frWxfu6kr5rAZR1GCooDzQq2TPtgKzzRxTP/K56vbQaw

hfIHuA0qTLEYDz4WwrZpaK0c3NDStut4wwzUbC3TE+
m+B+U/zEjw4BnqQu+TosBZNLIH8UF8AX2lxV2aDBAYdUDGTeA4hMWCr7aoJOvBrJipbwZjL6E6LkLweW

M25+iH7PfWHUPyirbwWlgWSJG2TanOfGvF8ZsRmSd9G00bYR/KZP4Vj2eGV4VU56Aag+aElGVZQZDYyT

yTzyTXkM4p6GV7VsejbxKxrCBC2sB7tv/Zq6uI/cMU
IZ1uIYDOAOnwNRIwWt5JJQZ6TCCpd6qO0C5KLM8YLZqcfaXvyvBuMTpW9B9TWtcgBHw66jhUgo5aEr/M

wPZ1jZW228AZINc4Q+zOxCB6r3YHyA03HgTYwXyVhowDlFVQfhQ5hXXxjCoOLnm/2ydFAf52t48j7E+K

804r5YsLl46GP18JxnRRcp6Y/xD4ZDkOJa6yU1GRgr
kgd10ebpFEDUjzx2LdPSZv3XjU08t0x+flmasY5pS6HvGXz7G4mfsZgdOhIz6JBli/lq7pU5vvdH3Uke

N5DzE5+mGHQcFDxawzh/OAsEVZRH0idhkNaastZCqGJf5ss06TsoflhAWCCLJHvOby/XjPhkf7dsp/Ke

Hw6HlFb934oH0i0SW1WnpJjcLYa60XLxzOOw4j9uGr
Wou5ZQ/y+Fls37KW+OOpACsEY2g3hTYaxDkIJzKwsfGRmREf6mqCzKayyyUt0g+Mr/UeLU2DL+966Cti

qAJIViHCdtVcQ8aB3rjHj/Wn0NIJ+2jvKDBMvuCAKS5Ky9C+ipBHDbC5CC+3V6GtKvRVun0idXNWbqEP

vnn0p0hCJ/RnMvLHu7vEcJAnjIv3dzbJV/wH2q4pi+
kBKLYEoU9pdjb3D+JTqwkGjE7sgIEBTAlpgXEDKsjaVC0ukA9ORWAfYe/U7A+mPLc1ys1SQC0LIVTI+z

n2A67gg45E3RUSRbRt4A1o/clyegNbTwgmkKU5nyq65jsj05O59Qt75VY4TtcgbosXcka/RisQgRsnBf

PaE9XhXbRdjnjQ0getV9rtSIBSa+BRErBzv0Y0E9iu
zSFCW3nPIxiCuXkwMHnhi++Iz0gA6u5Mmyl4rrbdevRhrqA5cCF/Y2yN2PLXQP/2W6FMfPBLygO1WqTY

q9YLJTviw4nCnUegADdj21WYsprggeCIxjEnX1gP7zX726mjWDsd60JzovNI00SC+6Vw3Cmcq6exWMjS

qgZoR3DLW09fV7LyxUMqkc1V6nD717cbnZ29AzjEM+
PSozK3F1lse1+B0r/tgbzm1aU4Sc8Nr79YSvWAl6yRLP0S85RZgpwcZ6qkoKi0lzWBGUrNtgiNFTSTEu

r2T4Ynozvi/fPDOgJGkvh6nDU/ViZqfxXVfnSxeyffK+zJ05nTq/Wl6AAZWZXQgviGQqGAOjAYEx18qo

B/pf6r/VvRa4MYqHj6krPWzIQTJf42LrfYu4QyawNy
5BkjzmYv5BEolE9tHiOWwDyMF2GmvPtKVZ1d1TSeoObe2dq55DO/7Ga9PNF7qySbNFprL/E4d5EGDUcs

HntoiOO2zOzmxQDvSKUnMBUHSkQxF6wI0ou6LO9pFpYE7CRj21R8V+AoSerdgdb+ZViAlAE7i0DeZT0C

eqG3cvZw3bjGNN0d+6FrFyczBKbpHK57rsSU267WmE
zR6FMdGzCaOo+7iPwFUssjIUL4E4BRgC6jdI9MHBL3o1yxl1LkI6HbSMiXTzf/UFF+1a44jGPJmlDrR5

1pp1mvKE8QBA0aZgv0506K5FttNNh2fhsalP97udp4Bn5E7khw1F9Aqv58n1CQF1TN+mwbcEESULtldi

1sXG8mBSd7thU1tVR/9o39zMYptHNhu3Enixob6ZIK
Q6cUoeyA9LkaRsjU5D1QmjN2pmsgw5gElzbVGyKnV/cjSEBkcnjFxfdzBpU0ukhCk60b1L9kasnSCsul

k2KmQ0MiUBojZ6l16SuJb2Cwz9X3ipeQQTjUiKFeba50EElGQ+WL0hMZml7EyA9x56khKR2Z0TfLew5W

ztOqDuHS7gJTEtB8NDH4ZAcBuo88jJBEO0DAo8d1bY
1d/oPWiBHkBW/P9w6Bwae57bTmVzgwkgIBd3e38ZYjUh3HfSSF2F40TiU8y4Xi3Hj/CXqJ6eabk+Se1J

/ung8+BHbT/duTdZXIty2kmPNmxtuqTcv/DXUf9cUvQ5WFBoJEktZnJjhDOrCdTHeVv6hrhOO/DrsUl5

LcqSIFT89QlvWmFJuwnM62irT/O35fWOa4KpPNN5m1
X4usCcIBsXt05uqp7WrdK4a+iUKGe5TNzxezOkfJTaF4yx6GoVVlJAWLDbqPsQSvots9itfPm9xK+/kS

4Mff3MyBQI2Nz8jvyirT1+agiZx6bltpMwDm0g/bmsfU7dpSKgi2zyFA/hzxuh+7JXNPYWUTJ1K0fwhO

rP2Mb3/fZx76ji4BN48jAJ6IxbUvff7JKcqjO6/AVe
/jTcXKS5+i6zLelOvkeySh+L7ZpyZNPuut+9I+d8BcZ+XkQrgoPeGf60hWuPZl8GqGzpbIJw7vlcK90l

Hb9M/UWDQHAL+JYWQXGiRTzsfcEcNx3A5Kbu0LvXnPvwO9mAeo1M2gyH5bA3LUZC+rtSphBI2PCQFcd8

4Apg/BtGtthFx0nwkQQqSKDvMc1Bnxa5HjijHG1+HN
vxFlQqvwoBcUHS15l3VgtDc7o9r03yWjCvKUr8xkz4ub86x3SjnkdPywlzo0MkofBFA3fB+FrYmKLiO1

CaMvV3FwQItJdGsyS1TxYd/9wfDaFxoIsJEeG5DW+ABBSeeezkUxZu4NY2JtDcW5IBiMq5fVesDTdAU3

jwjdd36MHesu2Q9buKXQ33NLuKbhoJND1uCbDOj+fd
BjUGW7C5MyrjX/N+pdtFX176YUN2jsKeqkCktV2As0QqAf9f6UP6rV7SAJsyL7wxVgekZdJ8GWUoeiCR

otHQxK14Xmz+crLMGa8bAew3DnsaS65sT3Tr9ZjDT7gq5K3VRUdb+T7Qcf+4w0vEUmnwiz1AcVFC+NdL

Ez8H/AZX232+kdzf5EMlrxH43ekrY6JUZ+PUOFHaNe
DT6J1Cgxpk3HvChlUIZn7HjC29Xp1DsPMRCa6yoNdakOYT/x8SHAkj6YeqlewnkM2dt6o8h4enQ01dRR

kYho7zS+E0M4/KG1QPvxvIO1jV0naT60+Dvc+Sqiu1n/2kFNy1hnO6elT8yUnF2IHq26k217Zi4y9EMT

aMm//b3pH/i8zZpQZHbtufISc5r3IaBhOJaIBZWrUm
3o/mSx1OzMg4SX+Sh9gMDgI0c/39MdSeAHdwj2rizh/E35yqwe4fmxTSIgIxyfImtdBLpF9w66N3rh//

6DXP7eJz40Mi+w+fXl1M1K7PsrOEMNsA2gljbtzhW++3+I02NmFAxBD1d7qrK+IHx3w8eyyjyGDog6hE

PlGf/d/89PJv+J0pBDbeWWryTnpSIu951lK7FbN++N
WZA9vocFL1vvT/tKhtOndjLejGuOsFma2gfR38rrz0xzD9uKT2LfuwvuzN7qMoxB7itgHegpi1rfzsev

AlbVA5EfpaKgQG0jjnk2sSE5mROYkma045+wgO78EdhBvoYqNY6mdN1DVyG1E0Xlk3xLhaxl6WlIboHm

/wA+olJ9gIg+h+EHlRO9XGEjZyWsSXYzCKyI2FmytD
kxrbAsm21IfwlTYUnC9qnhgkpx1xx3k0yI+xXGPEDc3AAF8hl6ySzJFOV+XP94L6rmLk5/q56LWy63Dy

MBHcRI5PRpv1Hx3p+Z9bE1FrFSMZN/xIt1Xg5zeIS1/0VCxAHL8nb1G5xqC5F5qjXO1w8Y0AyGywdNPr

BsgRLSTtm3w59lzp04US95voMV5ZH3MUYsIwEAupVs
7GgzuM7/6GHUN4Y616XDc+G7KAet/wBSK1t++su6ZHb4oLKijF2Ntg1YznVcwbpqSYlFPLRi5UlTQAX8

uEt5DDg63BmtyotBHd1Y3fN7S+ZQSPg6WTproZ98nLuOjDHwrxxC6p0faMhgY1ESnhy89Ag/RWygNkhh

2ANCU3hLZZiEqz3Pi2HpjteuV66oPzpwILZcwQD9NQ
94wIQY0MJWCOKPHbKAmU147DIwjPjgHyTMYyobJkwkfnJ/F11iO0HcNKH+XBwQyqES539+mZk10rLK7O

Qn02Ucfm76Fj4/GNovnrgcfOo6jurPyYXEJPW0Tix6NxYBhs5I1nvuK2s1nn+5KiQoHUcZCbNVuBcFdY

xWhzPA8i7mBNIDGs6g6b20eDzPgyGgYvSP9S40WC4o
RK+EQU6NmBLEkwndZHx/Fy8yluGKzEf9zxscai17oCG2cOqLOyVI1ppcZCjPKhSRW0UYCVp+pCNKjXTe

/L+OaJG+f0K/KicCwIJZazXSZUMhK10tdc6ToJfOlCfTO3pd8rrO9z926f1y/WMYoT3FDsbUIgSeO8WY

9HHRKrLWgKy6MQzePgdjwVMGbihCGGx8Yj0cML47jf
gxXUucLFA58csLFCT6012fNVtaPxQkeMeZurlLIjXbigU1ijG1RCQKpbjraSKiJrASmxaD9o0P1J0zn6

jkcoNbeS8afMzvRMRBMa933oGulW3dcX7RV/wPCawNtKOXAxLzGCGXD2xNq0/ag3k+P10r1sxzxfj3bS

ApjxQFBUJZiizBrxBDMSZV/lpGwx1fcEdc+w1mxhqK
1G0MmhS6c3Xvl8pLNhO11hW2Xk3LOB+6/PgXFaCGE53BhXH2s2pY5fF0qSNioOcMmZDf8Yrld921l21Y

Gkl9DuSPS7f+eetx+OWPQzozDdFG7iBgDIX6vjeqR06ykxu0/yTIVdXdxfptFbPJznt0OTKrBhQcyN7N

ZoTIM7DU4w0Iqd6VbQfRU3W42wo8CzBAJNbDKbRCD+
9+Vwp6OihlL5hWv6dL3dh8EIkwpKT2OyF/WHFMJvd4m3m7HzTssqxZkvH0rGQQ7R41eAZxWXKlmgwF89

lVa2dCLeTuYuIPEKwhXGx1YYPsUT7t8LgIXB/UbyQOJ9J/l71PRvaHIy0kFDPImd+FmOOUfBbuAHMHmA

LEv99+Jw4LNh1pPolobUVCikkXA8jdKD1RJd+t7f7l
UJvn4bSBFS23KcRwDXmeX4400137lpB4661/SUFl4gLdkLA7eZt147scxb5y5c/+GJ9WJ7xRbuEUrmo3

AlrU4078fKOpi0/fyd24mEYwIT3bc0lwHPQzoptGMSdeAK1Esv08wjoJT2J9aZtBwdHeyPxRuq5+lZei

Q4PiqSf/Sp8VYTdfX9Mwk4viGomVvhIXmEWFzL37Q0
fGcK0uXHo0YOUVhd/xF+F05C5IGCgUyI8ni8vv+v7vpum94ogCXF74ZGjYzCFoUBUzO96ZTUR6UlBVW5

71tYOdw2jZx1I7nPaGvdK7YSmdLjxmXc2Ioy9qSNUgAozmiOozeW71rRcZr2re4wTQKvQIpG/0SRKuDJ

djRpxRu4gxWd6pc9LoN6UFgQJ4SVbEZzyNtdkmzATi
YaMPLoGrAX/y8wBf+G4Y4C5DitZ3vCd0hUJzdfsFadzTr1RpEr+g4bWu29xWb0zrwgJk3Za/94TaHVHA

Ijetj/2AJA+QyfybXsGvAPlXT/rPoa1ltAWJNZA9UHIevPe97pi6gZaVp3WaFQUTk5mtpYtlbMz+zvwu

1YWKa7hcMtlEee3k/249h/gAormfJKcGv0Se6JyNHA
hN2OV2W1TBJ70A61/TrCOJViIzOIMyYO6xuhlWhKSkVP918bzftfJEb3p6recPygJq+TLalS0RJ/9uqU

Gq/5kleR53CGO3gPl9/nVc/NdJxqhjtL+tHQliwJGL/N2qEirg6se7XgvqsORY1ae+233OtIoN5477Id

/4j4e9Np/1GqJ3zliTHWmy1k/gNW1qKDmWMoDVjfMd
M2rigoBirSymdbdHErczVyJP/T2NymmkutG3834UN6rPNyqTxH0hd+f9JorB+lufPGJwZgl0kpLrkagx

yymcN628A6W6cs2Yt4vyqRl8iI1v77qjeRl3GiicnV3kJMedN+hyjHKXsgTT8JpYey+U5apnoVLFTAKA

3xe9TYK+3cL8JsjBfdq/XCYE7XvabdWQbHUfH30Ubg
yl41wy5G9AJAseOFrrpK1Xawgz5X/ShcfteDy68bAFv+Ze11B0d5phcsArDkmUf4dx5azYaP4dUM4VeS

Q0wDRsKu8/kJ6Rn/HrG8K24pGVrdq6nlsHB+JnBJYHIxZOOT/q+rad5QLagAXT8WEuG6yqMfaBi3igVG

n6S6HG5063ahJv9wvoyYNT+1KL+Uhhbvzq73SxuhDz
BkXiBgqcz3z40x5OwrY1vdXU2t0N4TfBWyMtC/cZQN/sqsUw9BC4RHfj7zf9IP/xvp30j/Rvo30r+R/o

30b6R/I/0b6d9I/7t4H1g52P+kt5T/H67t4q6Z7iRF52IQtXas6WoHheMhtmKP3DaGGjFlpwcZL27kHV

tKzycYyIFS+5cftag8O38KLF5w84us368mASo/wgCP
/bRCEGf8Bm120oQI1sxb+hKEcx6vGQK5UrN0OEW/qm+fi9/ncwCu9zKcz/+QJ+PpMMpwmBkc7ykz+4Um

uAa4+Uvp/XBUx2iiKKiEWaBmF+URiYiYBj2ZaUyTFg6Xv/xFZpf9XqrormJM6bjRE5QUWwLikxuTKUQL

/vry20DCrhIj95ioSNPEHLBSlNgkYPYdtCjUFU3Ilm
S85FE+5CUiOYnVS87wnFk5jXf8PV10vldD5Xx8xUb/kqul/n+96yx1SKHgyQs4AvjlqpRtT78fHHFF61

3PMwxd7ASmuiPBdLMJ0B8BcwcPY3lFdE5AvL6N2fp8qsgOGTrqO7Z1N3T9OYrXdxexCtG2KOnoNBEcvQ

H/dWbF1KhpMHepn5JmY5KuIwmuJ/fZfft4wiQ5UjUy
BSJxHlRc2qjmwOe9HDtMjk+1Gq9uDzRt52AabvFbYPKKr/E/16+mWI4HXMzGEPamD0JGAryeu8zuGVzL

gX4yub5JisejxRkj+jFOH+SiEwEovQ8rxqyl9/d9NmH9YvVVl8oYYikNzvSk6nFPwF6L8yVdc1xbt1KL

V0wSg/yCj11JG0x4Zu9sUbq6xuXyQ/Oc82HSSk5DNx
HUGUKu/h1kl3t2SiZDvVnwZ0atWKGMfHN4N/EcLh+uc5YpsXOR90cE17VuMG10tjqIFOWDT4ly1n+I69

JE7h/f3HLc2FmhY+OLF4cylPt0ciZwH36P6GJVs/d3gUW8p3AuObjHiKbnUs2KYU6AB4Ebrr+StY7Pz4

oXm/MiDnP1pzdcNQ0JxZ+u4oa1pLq+EfTDkNO2D+nw
Y2Djsc4kj4Dw0aHr6vwgTyFo0x2/HT6ffBdXK9cUfCEt4mJbfCLkUVzr6g56rt/qnw4BywM302fkoUxR

uTiupUu/bxMoJD4hdob2JhTSQ+/cNJOW3uOFuptUx4uywsn2lRCOob0mN/pTlvINfC+8aWeEqaTX6J9a

qLo+pEQ4HlEqEcXoTLS05y8V/brQ61MnotWBOhqw+6
nBT2J8b916Gm52eRHO5uLfL2z5iVZm2mSpMV2jT4wyYv3Uqa/vFcUvb9l7e+6ddxxfUdg/Y1gtukzLTj

/opEcHig+TuxzFTCuvvQOuPBVTMvn2WUrYF/VSDd0ANEeGapXLtiDRheZcVlWGdLY0bosIl8GICBw88O

20oRefu5E+yDY6sLefUZoGS9f/ekUBjRnXIWhYLQuP
lZwaqCfvm5Qf8A+tRHaw42nWPD3VkMa+s8Z015HNl7uHWOcUwTuvG/AbkmkA0skB7tZmH5HA13raRABR

nHknhS8CiDvZfBeS6l3W4ZfZ8ICqVvi34CG36auPRgZyAF/+3RNu+JIS/Jmk3onFjxe87bzAUXNAhJd2

8J0wbymL9xoCaznS76vrvSwAtfbRGwGDrAhYhKt9O2
pcQWsLg57RtEs3vKsK8JBhs71Oe5Boh6MicIXdW34wGPa9yD/IXUF1hCYgRgMmZ2XsndA1TRFf6/Dhw+

1Bq2XqzTFw2p8TN2eqGw5fZqpiK1b1akHYn9k8VGuEggTCIlldHVjiOSYtStMWGfxUrB7btv4dPNXSVJ

uzDsmdpUl+0zUX2LtMA/sO8b9tjqnNLXZVco+lLf3p
9YPQ4p616JJNjm1zs5iR5Yv+6f6VdjYgFf+wMuy8PVbp7MwKhMmChpEZiconteR0bGIRug2mG429jo8K

Z+Lz/Otud15L2VdoEop+fuIgwBUkYJ7NWEnNJ9PabraMLArx7F+0YTp/DYlrKXeG7N9a8MquSU+iusJ+

Ps9/Q0odm2Q7XbP8LBnvLToEthVlRpMKVSKWUY2MB/
KKAvz1sb2uiG6KruvSCBxVXEturc+6o2OO0igC5Vylo0AoZlpIlv4KM37Z6aI0QIm2Mbd74ESFM3dkc9

4YgFvbWmLRRcyr7T/ksSkhryk1JJFyZo286xKVemS+0jPYi8u+OxyT8tbgq17wueUJvSGlDkvg7U4zXw

pSB9RnEvfQB+S3ng3VX4pTGV9hJiR6q7B7gVwVjT0s
dwTyAe6NkzcHa3VrUnQVdZJ36bnRtR/ke5ZWiW46q7B7Xv+LY1ZzSr7VeqspUZROuReBN/K5SeCzFjai

fTXfVObMmAawrF2s/uHF5lcMpGLPqVqu8sJteAWIask74hURwjzspIMB3WxAJ0n67Uy2t5XzyooGFfwq

5j5mTvTWRnKjdWzMX9UW6PzSntIZIrwcitYY58o2Ig
TXq2gAVptsyVe/jfOapt/3cG2FV49ecMhrIHcDTxGnSa0CwrZ5bYJwz1XLaAk5BACLr25X/7PlxcXV9K

OoegHCqPnghDFaHTtBVfkYeiEWVNrjL5GLpXbMFMDPhxVRL6BqkvXMZSDgbIaZEArVfS6z1aqt+tf9cM

JgR7orbv/Pp+1yh2d1glv82fE5en6p6+YAvJPGAShN
SX+J22PpTYEQKomshIrJ8PEfNiOqSyqRWhPrj/OyvzqVHDPc/zG3OfxkkLob7MfhkoIO4aDgQ3nE1aq2

yV5utCtHWZZx001MWyHAsEgvqoEWo7AmimbNuSs8vO60kUiicdU4tnZ9L7yqvDxbnbDQst3cassa+Jwo

P9B6yefge7BFG8d3qAYhQZS2hLh7JzyujOx+YnmdQB
KxZOYSqTJBCFcUvyhXABDWTGY1FSNtaUn6jbtudpNzeP+d5wQU0tYmfBvCg8KB4M5+zzTMrr0k4dSDZ8

rk1kuDi0vkmPh/yBJPhA/yCKkYX7scuFyxZPBqOKZU/Skz7BKPkhcbr0al1bVDEmtXm2ODXCrs3FxfXd

InbGqEGX67KFQ6AgZVGDPqHVoVoEtpJ9kU+704JK9c
X2oKK1zoBsHIS+UY4bX3Yl4u/0GkrTxpikfM9O5F9jeVNYWdDyhoqQJ8I2Jz4SNJdWiD8MDrAZgvzJvx

KZvoZCOqzl/MpK+CbTv2LRQCesUelwDYzJpnEpHUpReViLvJS++ZMTJjJ7i8SbtbYxLKOaht1cOX90tA

0bHLNoKSISlHgH1/SapxpLe8b2ol8EHC9cc+QHmI0g
wPky4NbGavisnnZG9lWIzqd9gCtWc3l03p7ANtcu0M7/hjhgRUs0RbSFtMuBXHA4MApLHITYvLW7Yzoc

dkJM9gOnx5Y4t6zj04dj5HlvF3JLjmEMPu9iAmtIcVbtF2lmb6xC9BR7tsCM9+ZHPRNe+t45iRrFldCY

4lHrI4fnwEBgFyqZ1CP2tw4/nt/MKOq74n8aJVcTMi
7W3HK67TAQALFBYjMoG7w5jIcXifzExBcdt8sslWzhbR2bYaPP4shNGhXV9/M0+AGldPCg5rn/N42TDN

Z5u3TEMw/z3qNE60LgFOEw4iPJ72MKKoyJpZGdhNXV80GZdKDwmBxTnJAW+S5JNdaAhHT6xc4uNnCIY6

IisKbkZOpwd6MuXzNUe7yPEYkO0wxLu9A2IwQQ3K42
SSgvxkoxmEaJfwh6n6qStUWxI4GmmcLqlmxDqZNqjNXaDv0xuSAuJdNjIS+L1Uh+spieEio5dxYwZgwr

yInEkZ7Si1vEO24kW4yWMOHlyh8CMSeB3Tc1xxM47VGC3AzY1e9cCedil7+OjgYJw6R4L6nzlgM1bjv6

duFsag3OFrqFHr00ZPzEQxVrW/eev7Sb1TfPu3yaz/
n1E7SLhWzEHCXZoRXyr8ccwadJ+xRqUx3/Z8ZYLy9yUqeTub21o3nPbGXm/Y/TMoc3iX0ljidvS1VpEB

RDP7hylFXG8nt6ZFyq2pGSc8kWH8303O8CnADeoRjlGxluIpvEH/6Xh+7jYeIKxbrUbGxn28jeFkofH1

jHsY5KkQALa6D0Ds2UgXChF0Ym57AZwIWk9RJRFI95
BXC1fnKOiwwuV0KWZMRYofSMfGcsVRegCxPDBUCikU3Nqy+LqsCODz6FpyaL2KMtJ0tMJIJQPiIyM/2J

Z21JhOv/4ma28EKV/SOy6EKHhpgm5hdD8r8MiizMQmxFCLlztboeDQ9JgF1dUa40Zuy+4iPZc1uaFUeB

NpbhvMaE1X2LUiCSdmSMxuMOpFLS7yqI4a+J5K0xfV
6UmoLb4rzZddZ5x5z0znz7ne59iaCmgy+NYEZRwzPNWspHttXR+PcCgWChGX8QCBCD46/vmZyj6ptWgF

+YIiov9/6ol0hnXnqWUKpYVahQMChBwAz+SwkDOom4ddV+6yvXsDTqmaF+P7u14Tl2/hULTNewgLxdZp

MtRUQ+BDWLNh9gwgJDKUbOtJnd46VOpNW5qbKAGANI
oo/dVz/s+j+RIo7bYo+I82Ti4am9voVWUNh370gDvHeqzhO1z+qUDjQ2wHCOo3ZQ9DlqYJZVQA0akgat

X5YmMpidVLJ/buet1stkU/2Qis+nKSpF36usK98lWyfNBGVBhbI8ao3GWRyeOcvg54egqTBIg4Ej7IgF

FJDv0HXoqtkU3XbVLRLdBJUZBUnj+ZKo59aYRDSHmj
ZeSSQvNWPfAelEfLAM2whVjP7k20xNOaGmDiJFnhz8dnaN5ehaZUeAYUfeTpLofrlPQVsGRdzx/16OAm

zf6jCk8ZQDHmEiL5Uhj9yPZ/fCU0mkqTG0HdaXDTYYuB6TpNv69Ycci886UF9CJQ53GwUh+qEjxGiQFZ

4/fBDDyMeBxRt7/+ZA4xy3/aFivywhLQP7Ch4eYp+v
nqy58rRfQOab+fMCYwhiISIhj1iJLWSrpsIvAughDKvUfTAIwcOr/7qHon/bbyfC+GAbnhzXcJpt0kL+

DqMOCN++kKiBh2woRqZVOJQziL9oRsDofOwaenyeeQbzmQmo5wIFG12zM8fsbbF+LvGCQejECFrPtk2d

Bqp7vk0CYUmmGedFf8jLB4NI4MUAJ1Kn2BrYfMrX2m
zAZY+2YggeFcrNpCAWeeLxlXRtWOnEBalivgf2XGVl5AU4dcQoBhDTDmz1TvzQXMoZeN1Yz1Z/NYkc88

S8wn9dVP+Zi0cXO3ZJguJgStnWzvH2Qx6fCmRVaxxe9WPxfZX4/mv5vQC5Z8Bv5+ccYANTRiBsbhzF1l

NyA2bwMvccCay79ySnpffR3DeltQLk7YDkKluoi2r4
ZpnD1qL/1erJgMMtTf/8eCV+V3u8lMaETvg5/D11frMvVt/jnpavkMeKtfi0T7Xu6aSeVYWTSD+mnxU+

NB3pB6X/NCNUB54SYlkj/JcjE9v0bJ+QpdpA65or/BnH/yaw8jCi5QqHndmNDeBegYDabYA5qn19W95R

TDgp+S9oKAfSyZGqp5elYumqcoyMxmFM+6Pl278G0U
YSIUnK9PQlo90tadvYgc5/7hVilZcdqv+pHyk4Xk9o27xjhQOdF6fGe70iKn7C8ses7F/59ddBU+/qLZ

T3qiZBLQ8AVvkmYpxTSoov1lFPDQSKOy1FXeM88pccF5MgEnSDmiL1hgVs/eCs2e9EPI351GxGL3SQUB

RgDO+HNjMsijCWQCKvZEAMlMZl8oikqjWv641hQWu/
wooxu04i+o4CGfYeMbgwuijmCZ0dtKZQcQIvUL5CKRQ8SfPvPo1vNlPXfr22H2XT6e6+G+ybxL/U9wtk

sakHdEdex2qUU8d81mE/blqQyr+Qpwz2md8QhShr6dbcjszLGALqoblcTnAfYOIWTuVrOSFrVI2PsjKl

/LMoYdtgJvzpH6MDScogAsrEv8Xvqy/laF8I70TApT
5YJLwJSwuVArwc+JkfgDvVT3SkOxX6Yic+DW0omIU33E0rgf1eMKWhOjNbdhTazmzLFQEjcg9rR01bBB

QwjZRJdsP5+hlczYhqr/4hvHjAo783EzrtVbaPpnAGPz61iqcz8TKYBtHz1Ya22DoAKbHwqZul0vvirt

0qI2nVpf3RMBeV6pqUkq0Ab/Jdf/2FHh0YgHt6Py0H
TeIuhuXKqLlN2uqL3jFJDwpMeDyrrLQmlBoffnTtu8QxQAWzs9m3YxCX9OE+znvJRh+qJb5Jw6uEWz6T

QOUvhaxSO9nb+m8YiTaIqcnMYzL4gjAyR8eZ42EIhfBl+wnKhzkJbaiRFGRg7xnB9DGFxKX16CD3FMG6

BTiMRUPn4SoLtsfe99E2ZB2REnCViPmRDiDiri6eHS
+MuxKKiP5NNKkdVKg2BGW6uT9HMfg+0x+uLMTI1RjJ96+s08leGkqvz9I/5xrhFZCT0h86qT28MnVKT0

jLaY4Nl5jhp1ICDP8t5OLerfTa3DWmPRN3uyKt7MFOGUNXePUAzV/prlJkpjU21YFceQ2o+QgBlxNysd

PPkH5CPe+/r3rvnV4GHXZmZSQkncuaFtHdh0f3ZukW
/l1zb6CoTPbcNoEhbo2LfK7V3ISUIL2felBK6UGien7ya6Dl+d0xzV1juk0tGiIc63b7EW6M3nnU2oG0

W5nlFgLyMatUOvS7Ml97Oj/fKIocdkOWLx24mDIOi19laVbqB6fdIw4+RdbbfXmCpPjUE9zNQq2oc75h

U4ul3/Um4/zGTVRUnzBlezklb3sWAq2Rup1MtDVeBv
vpRIW4EONTHUO5K6RO8UezT5/Rul07GCf3WJ/wmL/QbP53wVKqMjIe1BkfC6/3C9Tf6/XG9t/4vrLTsB

Ec9l6tZIACuLaoWzFxEAhYBOo++NzcpJQ2XPXVVVpcQKIoI85i3b1KBchGD/RNHhXdPVBa4xCYr80qlx

RvbPNHooVeFSdzSgfIZzD6cIJts1zQkm9wHn0cky/o
P8zkvlIkLOmyyOV4vWzPwt7h7jUHaL9H2bz3e+l1ElX5ploM1QI8z0t7x78s0r9cJ+tvaZ/0gR+jR498

+IvM8R4Y6TP2t7x/h5wmHax27yzx4Dnr5CWnVanz9xOIU+cXj9fye2L4LzVi7xNA3ovlxIHBkif4HRL4

J3Y0zffPqL93f+CmMcDC5jbxpeDKEoPPn1u2bHnvwn
Q0Ta+aNSLpGu7vKaf2yVBpQWOZ52zgp+Poc0uHHxuaIpyq2DXUdt01gh89rqfye2yrqu0yAAgPjPhnst

3lCIKpmOyZuUiKCTycbGME2RLBuKKMSIuM4dV6WpL22wrHU+BF9+m9+/80T5YEzVuixRcrtu5Surb8vy

8+sn3u9M0ok2e2gO7DWzAZ+scjYeNFA20Hs7Cqq/Kv
HeJQwdiAK36Jj4u5oxFwEkMScMSG3BJNEouDSSiVlHE9YSCkjn6IoW9LPqjzwN/ChuJiipw/3CJOpQvw

jm9TmZIkh02JKWcT/c4EQ7A3kVVdPwfujWGWDSqVxRHoNH2EudaeTDp9c4FmHtTs+Ies/xuBXSa46n7i

oOybNMtU2jzn19aYzKoy46uxEaOgd1wKZGJ5YgRpXg
idtWKFRZfzZ9XpaWkOuzyXLOEk3ROWsMjtPzft82prQf3j/jhFKD6dcYhgXNJatbmojrTXFZG2/bN7BH

2zk3r/Wgfz6yyJB6VWJ7sSdm2yrrdUxBU6kNblXKTKkc5OVJzIFPpaJXisy6oea9XEWszEKflwpkyaIe

e3Agdv9NpcS1+m8B97/pRu6gLvFHL9JVNkuVbjg/ABHISHEK
eYAHqgA7oK4YpFJKuHAj1Vjb39NabKzVnnopNPDU6gg1+w390d1pGae0e/VxWONAvdFzBNaNXRVYqg6j

JijJEVIFvYIFpsHGTwC3Gra8B88whnU+/JYY4IfV5xTY1ClB2Jn1ZQa5+MfrQnPFE/6Raz/38mdkw7wx

7Vi3g02CzJZUKQE9zHt2RCy2JCLQFL8UJQED6pBC5Y
pjqW/QzrfRdv+xrYg9XFJEa9yN6qhuG8VdpsDgBjKo+YdPNGH8LeA/RhoTwdYXDsBqzlW0FbE+pyIX/0

b3Np3fh4REY5E9ADByGNWN5RC+RuB+nw91Zub7MUlNc01BB2y3SNmV7phaiqi0Gv1ar1+LbyukqW+FF3

/CsjnlFGHIcNRLi3CmqSpR4c3DverPmtZeDspYu2zM
GmXsk4iCBw6cFY2BiBDZhhFYipj1G0PoTAg0Ef10f4nOtLf5e9wshjxcl9IRHNEq6P4VDM9o+YsvRXGL

Esbg9NugDDEZNgdqLUxlpqyIqshnzZK217bBEe7R5FXnzmYaMAP4HWA0szoW6dwNfMEi42fXg8yvjuE2

I1ko9Q3wZGYOgikp2fEimmBg9p54TEgzVpWf1MKcsA
FyZZukEJOZYkBA2dCd0TO4/NUDa60EQ88kH/49Xot2SQdzLFooMeUi2v2D+NjTTUFHD1J0fFV3XZpuPa

aTYWHXNwmx9XMACJx0fpJOmix+XpZ1dgBn6hsot/32uQri52VKOc1Q033aAXCpRE1lSGbyEVYnUUDr6+

A6neAcuksNW9cp9ZoMqqDca4OxvjN87uqsonWdqjSb
+UwQr1tfhkoTOmgOLbSsDJdxYkMPYxAqQqTH84sLhOypGp9d/N5mkqN1oYgctXQXdmTqoKzUlT6fSJ37

vfEWuz7WWLNWPONUzS5QrsCwBABoJVGBWHbeTg85PPpP7X2r0zr2VYCX3mIZTLw7BIunUQQsoRir2d66

cgudtcSS106bRGoCoo8Quz/138wajB572yVEh8IHRG
jDrxiJjYgGqkn4ux3c98wg5GQsN1rzfIR+buzrWx+sYWU2tbsyjtBxgsOZrGkHrAdLsce7EHk9hw2Emc

mMcUDwr52YlPGUBPobrbx3H+6IKW6I+6ePzOZOmrlPrDNyUnCyfRGgKyQtCPGxGdPiSqdyLelKZWgcIr

J7EkEnDrDcwOt1bvjbqcmB5iNNfnkCWNcL2ueD4vL2
2hc3NYctbWn+pf5DpJk6LaxGSTSiYDfZyZciUrv935SYswb0Gjy2VkH1Tpy/0WwkemQ9YVqQrNnRWX5Y

Qakf3NDVviAGN2Ma7fsnWrS+USNXT083bhK0oNkCh0gDxQFWW2ULAWsMFvAz77M6/tlb9nASZnNiqL9m

5wmHsDbtEJP2oSYu/861G68nzFSOyvu3bw9JEVkXk2
+go/T/t1e8dKiUJEVV2TMS2qpRdHQiGZ4u1AtubI9G48OalJSPSsuSCuuimHWW2Yf9APh1jxe0kUh3y4

LlI0rJOvv1ppZpQQyKrKdblExfOhYem9qulXO0h1Vf3SWsVnEXoLD/jQStCSJT/XbxpLIBFyvJ38c/DU

uKagV5Fu1qNcVOFumMAwa2Obvq04I3Oa8YPh7fTvdG
wx+oQQs9xwcKn4uIoSypzZ389jbgvS4CVd9QV6tQGwaFGjj+Thxp/I8+ThawCKFB3pQPW4T7hBP4vPJq

siWRPE0atjAw/Ot2/Yd11ot2IugHMZvPk9B1rXspjitWN4QkOW8+WkL7dgBPF9XzGpzfyathBk+gTrTI

TrHj1d8+0WD09d3oTbO8HnwhPvY/qsJHXrxodQjPsl
JwF/1PC81r6co6UtmHwvJcWS9Rb7lz1rdEfPnsrEOgD9xWDcNKu/x6qF0Ve92rFb17jEUbA1yM5zixlQ

tlBeTBBEJLQqlPxztzoeGbosUBkmB9GaQotSVvON9Xpdo1eVbg99quX5FgfkrveCSV39gKeH1GGt2eHC

oHdgtv/q3mr1q1CPtLVpCUf+lhBOs7fElBjxZiyrVc
gadA+gXF+ireESjJ4BxzHUxVj/aDZGxODPX/kiNrpDs96N6UVCR27Eod1A+T1Ko61EsVaq7D6pxwy/q4

YzSJd12h2rupfAHhc/cqXow61/jU1xXG5HJw/vxT9MRu2TJ3dCHCrkK5sNa6Qs7OK2yF3eOyR/+qbD8w

Jenjb3Uv5Pqpo9ZQBX19PtXEEbrylmNVKYfX0JY4kB
IDww0apxlDGJhHXpymExlPxjPfSw18Z/huVdaLTBBhBTAhug7AQh+HQbk1zLYzF3CCf3MgLKgrvHDrjg

7xe4c3fSfHxxiUfLKPLmuKXDeKgDl4owa8XWHKxVVUKI8C3qgf8++1b4/cwEK3RAWY1aJyNxw7Tt3bid

DKsYpTSr+KeN5ZS0sShfWG9HoZFpVvaZeXHiAxDTAt
98vWRo4PinM6ffyIEi64myJW0xRVNaw0vofG4/d/doPlFKfyYbt5+ZtJ6VLInuNqTP5R99BQjyqtz78x

B266wibJ1hBt1rqxfhWfsvd2EquU1IHcxi3uyMOEjNpMufOUdzO+6XvkNTnS6sNHOZUSPe0F6+A5qhnH

xvw5FYXnNsdccEA/lPE8c/SawtsRPH78sh26QQgiE5
+fxzIc/ZJUfg1Zp1SyTbEs41Va1ew912i/AR0hqNbOdNfakM7G6Bnjvywexa0fs02Yv5FQNDH4vJ1927

oaAlUPl+wgAabRzLNJ2HN5Bmcj7v4eaczc6cPQVK6AEd1EDi69UJCMZSPuwiMUFSMY/+7S3ig3tw5sCi

CsxT/JkA5qr0jMsPL/Zv3WXJHNV0ZE2532ehO72rno
2vz4Oteuc+ffKnc/kFc7GFlsYUecuRt8seepsmCJTkWiFXC33NKmRqSb+eXYQcVXN4AV55uN+ghS6wLK

HwBgBl8fM9/ha0YQWcEz6fLbZ8eUBlB+Zr62S7m2frzB5lj3t0GDF/o2688gQA3GpLiGl3wqXBGjUIx6

hJrzu/W0YHDqBsNCw+X1Jv6epR+mnaPPj0HeWcoiTS
FEmqPdR6Gi2xVsmL1Mv/EZFithbLIC+sWC7/a0sK39u8R6SZK/dQfMhQI2U6pwEcet4RXu7EiyEyY0HD

x0Vgy2vcs2Va9YaCgDS3va6+ORNHy+B46uuhYgsBXmRWUzk21kDl6d6qwij8sCerf679uH++0DuLyJKI

a6YqSgSOvRMKeG64mQubxs+GOgZeWOf9hiy/glXo0j
s1aM+2JH14q4WhAKATdi1SOzo3WL64sK/McOEvk5W6k5dH6ezSWBnrgaPz0RrI3uwngrTzRAFqj+LlYy

4t6NNGQPhIdneN1nrtKJcd52zVCMOE9rUGAQcahZoQJ54Jm6LqwMh1eNz3TCWJn6pxXuum3k/3uyHJEn

Be+n5LhgGDC1gSZLoEusZIQyas2ph3qhkwgS4E9Fet
1f9hupjtnM28mPgJXtD7bR0JhqfKB0s8+z2cGZZCVrz2u9B3qbDtQgxHdkOg3WuV6gLkJu6nhcP1P/nk

UUDlTYyJbgfb8ukdgBwf3x30kmPAtiAn5fZVi1ox03NtLnSnTVG8J+l8SvRur0p6Hp/u1ORkCS2DNQwI

VRVi7BcI/UKls4qVsXeNsHW6/qY/fwRyr1SfPeY36J
oMfCQMSHXwYf135nilxHCn57PUBrP/p+rMQtXFzDOFQ81Vy8Yq1IepiN6aJKPtsklxVyai5Jcr7spFS1

FDDmok2DrkQlGX5yS/KNSuo9IaO75eHuG99vCBAG7Hje/BUqA6xf50JcRfyQfHuLIQidQAFIGDfOKx0n

Pl5dfKTd0M+dsa9tUcDA6UzT0sNp11ESmrIC1qQfEy
EPP8R8/if/H/AH7lGsZaxJZNb0ZJLiHhPgWiZTkFTDLHdf3m7Z0JtRx/oao/lF1Wwzx9O9WOTi2M2/e0

VPMraAyPGaFLoRfJyAy51bR7T2xY25c3zStfh1npdHZm4Eg9ebKET+5+oQ/JbogEv1lP23CBqzK0DIOZ

Bkw+flG63qWRmNq2ZGXbwzez87uYUcNxQnpfhcL2m7
1G1XaR94nDk+CPeJrlcj8al+q9Ketp5LM2EPkk0Z5pT92FW6NgYY7Tm3BlN3XUZR4m6tBzFF16bL06Rx

PpEKkD1CwIZO7jS90T3dKrw1x/wCEohmTeXl9oB2SFwC5M1KxLeXjHEkAlsfSdBT1WJoS0X6yRtJ8zxR

5z7qMSOnHdcUvXXBHQlk50vQEndN6ttFPjx/+5YDRR
G0ztS92GKoiZW29E/MXm6vEHsKCEVSt2+AWJuT0a9h2re7KRiI9pe00cmp39SLF72L19o0WyCZRrz+ws

UeCq7k0r8ye8SPkZJfVzU5UiSMETbLiA/ywJoy8e0IPCLwqjXDe37iGe7a3fOL1X73q7IjqAmSh/1e2L

fYWGGsbOPMIQY32QN7NbuIwhvozgRGjSWhGW5VrOv+
w7HM8rsPtuDDe2/n5pvWdIxTyTA000DoTUFJnUNj6BFEfhvfc2++dACeFmGOv+WzJb7V8HCaeNnkarYA

bi5kwQnD/3b86Hd5CL3jGItlyFhb8dva0ZwoXWuR1AVbuVEhL3KMbkp0yybMw+pNElnZDJJ5b5Q2yCi4

0aVatLiiP46Lb69fcFVD6zspCQkMRnLOdxhtxYNons
jfDBgVqEgktx4zofCkXEa9pApqE1NNL4a6iV11XllB6uZF5z1bexgdBYQdcRfP3sBWrvY7XwoB1b4/eH

2HL5TZTLHVRijIsOtkWCr+oep/weV/gK2W4WKYOn63tARvFoca8OKzFvLiulSv3KKLj1p4IGCv7pQJk1

2rgHPPKvugfY/C9EwhHe1pGsfU27oigc8HbLzo6jhr
UizSM6+spfVu65OwBIfWuR1k1ZOHxuJpGd4RLgjgczcBqNjReD20dDK3ki4yC2mo86WXdzIctTGroVwH

068gXWU1UO1FSufJP0aYqRL+snZEu5+PzcoNHAEnp6F+xitf5nsFDLCFZh81lMrjlIvYd+fWtUELTo6Z

3xXof8dFDOqGNyEP2JhGN9N7fyglCADOe5JH8CiLT+
w/2ARS3Y4M6ziak121CVRsxJlZpyrEiqbWlhtCQ5/dcPQ0D7mdfrSjJyZw7NJ9DCHCo+WzDRmxh8375j

0zmOsRRVq8N4gMN4GVmGpQGGrgz+nVIs5dDd6par9/cO1jvbZE2BIqbfSYNaYowIzuIIkymGpfYC3c9b

y6GZWXRN26is0Cu7028WTRQAVyVTQaijtI92NUAk7z
C3nqeJJdGbBbPPR+CsG/M9mee0uV6p6J4kRtUpHsT+8t7Cp90dnEtwz6hUAWAW4Qn3sDsBudoWav1bMq

uo3JNOQBFbUVdaqEzJuBe4IE3vdugch6hDsjLD+Ac/qazfjVjtmvplT0u1NBwGL8nSKFTBUqQY8zCALl

SQ/q8k+mHXqFwnmw2y1VYXvKgzwXxACDCT7hTwlnV2
8jKWED98FuUCw03EPHcszcvmp7iztHS0VoE22/wD6eSkEQdW3VSGhydgcobbgLjE8zVlhLIy+tZb1gac

pbgGZ8aQM6AhGu/M5mP7+uxkg0QMjZXibh+TO4AjCngbzaZ1d6qQSgJp38ySIoFlY0j4ig+XNsppUowh

/bHbkUAnb2XQxanp3GPmCF1kedcHuKRnkXA9sduaBH
6W29flN1zHcdoF7kg7p4pzKoAlKjjmRM37NPY494YitEuqo/4hakNIxMBH7Rpar8KWCtxeZGgm2MrPIp

JFbParL9BbdNWHnPxcoV3ni8RwC4aNfF7gd+9IXXTlhPuUYT1w8+ZmlF38pNglfx5Db+jL2PDIC95fCW

m6vVhWQgZ7RY1CReuDpwE0uoF5oKkej+ZYBVWcheMe
NDCkQlFNhH2SbcXjqK1j/21uXCUjSzHTkucoAMh1BRVfi1tZWB9R2eEvAY/VrNjsaV4f1Z84dL23+1rN

g2DlM1W6FpP8EKjv8bvxhP2cgE5hiLAasArmcB3kVU0K/+zRH8TLxcApH8aikx+9PZmc2MTzNB1m96Kj

XnYU07BMF3BgiUejxOav4zWqPcd5yf7fIbPrtS00EX
3a07Xs/H1oYVDakGU2xo2w6Zw/aKnkb/4+xpjb6Sz9P68KCme23NnJBO9kdEmkzM8XkRMn7i2dCheGly

0KoWBGTSjGFuPfZuDnCO8sEGsyqskEwqFrEKf9BO2UUx0vI/QL2JG1Lf/m05aFJ7W21id0KQaiPVxGei

k5gjp7Wb+CxCyUzHruEECh8VuxlzwHwJZhcdOXsPPb
lII6ptfP5zzyd+nKydr0M940QWvp9PvwMOCtL6pjXAzrEsA1P52OtTvv8qiicPQMH7e1Q6TviUFX0Rhq

2FdFMtH3QwZbKp7lH++msyL8fLQmLftfx6WVr8d/xfy8irZG/hDHogbW/VNcLuozpySWMcXrBejffc/R

sCuBib5kyO4+oKHd2Lz0f/ScgcXayLZrLzt8yQvAJ2
0fMgib7RJ0XCGPIyR17FxHVxgpJ09Rd6b6xwfEoIgdx6BCwjT5c29DevMgZw+o2amJB+J/rxVtFziH3v

19d2Tn4X+9yCq7Rmo2w0EDo9JwH2/wetkvXUxaN1XmFMteoxkoP/w54wS82fXpbcnAHc8mjURYCkC2GX

NpccXXBLz/Hl/Ef/EXS6/8bwOaImVHWTBcYmoB4qL5
t1yvsid2eVWGrbuLbiNoE9Ho1GT5TXUXop+FaNVtWwuFhNbhKYmM46zTyJVc+0z6qfGS/PzDDQJxgYA+

mHEi1+DynxgEMsyFYmOtWnwLMuyDvuyaTgWm3PBM6SoQoqlKvQco2U0cyd2AlJ5WIA9kzUYBAeS3Gbiz

KFXRaIMrulkZ0ZPFUDZ8ZHI7R3bvKBS22c704+DGKH
/dJHfnAlK31+g9HuGYEof8VdVHvS0m5uzoO+KnFN6p4vgn2KIoD9KlwiqAUh7qjFZKeQJTZ0z5I10u49

EgOh99HdqTEslQI+ZUQb0/J9RktiKekau/ogp7ht6c+Iy3sRid96sMbRh8sJcgQnDeWL7wEvTUKW71Xf

6XEGwYsbVMdi23jl7Rg5qUnndpdIG5vJD+vpMOxwHT
2hUFp28le50nsYF/MmutDHTVE6y/h7ILELv2yJZGD24d36JkWn0LR3bqzDCO6B7rAc0Jif9Ps8UzWp/e

3gPg/Ja9nVfKxTkkvXI0sHQDWiqAuQF6XthIcUhenbuRbTL/oSyXLVEyd2sueHmp2ZNilt065ZVH3QDZ

/fWrR8w81q3kO59/aL1zT5ptMM/qD8qwY93sN/agb2
u6fztq5tuCIYBpKv8bflJ0qgmOnjcnf00z1gP+ifZWXRqYgz7sjIdRY9dE1/Zx+hMMn8p1VSoqZt54c4

n/JLicY+r+545N4qnCmluxn3PuUjVkkIvCpaeJsJ4R0iCyXAT/2+NT/2KCBoODVSOMc8VxoFx+bx0p2H

Xd9fUagZdbaGjQR74U9aybyvzpk91x/F2h3uATGxjG
i11dXXs4/hCDGtj+CXEldrDkogooDR3f8euJkYCF7fbSSGK/3vdx0kjEY6pJ4S/koWUxWxtRLrH1dPMq

j/P3tg1HOGDEsPeoc9IFc9b7+BFhaI04W+egYWN6Vh1HHkSCd46uecAis68c6QTAA3ad9AuMp4sJSoce

/KNTadppay8wc8PkRaC4P6dyY9dJS1xr40X1esn0lK
CULCOIYiYk0FLCKvqiCNvAZybGhJ4ikxX8PsECXHg1zpnLDjF22TXIXVA+8FjR7oN+2jq5NxeR0MJ8wL

PWn8nvZ+BiVm2wppxLIdlVp8drAhI2aIxaGjQNm1twnXRN9EJoHpoR+yesep8B8T3bgtPGeI+rUij5D3

aUl5akdrJ18LBpnScL8YUttkVyi2vXoohJCVh8+mml
fqqNU+1gInTZ+CO/PtGfBqJbUKCROGxXlptzicD4V/r7S99dQ0ytAqUvtb66GrfmQU6tGzWxidUxf+6j

lkX92j+/DMfHm6BGMg92JyfHsN4XCA899rO/CcCrJhw5Zzm86DgIDnUNIg2nppM/5+T+xf+1QRZ239fG

6YZL+ahXaHB4OjLIE222Wqc+Al7Nyx0Ye9Lnih3X7S
/r97jsa8sHyxvWYF5ihCY+xiZmbf6NveWEHBL2yAbsm6LZVMMpxTltnv+69sGwUYaD2Ozi+dLFeoZfBS

/rxu0NbzWT1QDikEPc7g1gGYV4iRb2Xa4QFyPXWxNTH8Yuzz9yLjOuevp30c+ZlByNnmjl3F1SdIgfmw

+Wp3tu0AxebpsDERgd8Pi/5NZBWa/CuOvQd5gZzZk/
G51aeS98IXguLLhOAog4sawO+3V5h4Bt6tpnhw/mECzKPj6Eu5LzGcl44qexFfBKexoEUFhWJyHEu4TQ

b9TvvgAXm8couz/CdK9lohtrmT8zpBM0bCbDIwYld2qpHXioHtfHTZvJd8ZmTY6xGy6U5W9mbpfRjxkz

A7CasiiYyOGs9Aw/Ts0IdgtBHMYKNaB2Eo3EfLoIuL
pbR63CKC4qrTgFcHcMSr7QFugHsr4eqBFsnuXeut5Xd0YV+6FjDL2codzvJ0T+LT3jFZBIrI98sW+yGr

Sjnu/5U8pwNYi7e0edEl8uztnrA/uHn5PgvUWBnHBcwxoVeGXmvOdGCk3vsqOIgAxtulraBai3QWO+Sj

U4CTHoIp70ns6Ssp2DJix0NLQ45sxxnEZwK39ZfZMn
Li/ZWy4lzvcKvcEU8wQ8C6zof5P+QAw0ajP5vKXAGkNrWJDFPrGX9ftCgLs641/Uq3TAGbUMWdVxRkps

IuMYSzY1INu/qCGkI0hmjp/DjwocCq4Q7kjTtp+X469Y7/370eRPU6Uf9cuvXz+D0blZ65qKg/QV3bI5

KjPGSX6y1w7VPtza21SfdYvQHhgXUEJNu7xNKWdUFe
AUlmktzeQzDvBsNltQ5THoDQqog66ziyE3VLxmwTpzBl6sFT3UExXkdl2vx0AQCIUdOk/1YuBH1zZFMK

QuzKZ1amWpqCEFeM45QTPHp9tZrYpuaV84mgOvjDoQ2Yq5I2FtqhBFursE22KS2TqLfJniYOzekVCgpO

KSJWmNVcW40yTlMEtwFvcyY4nXBBxbJewXmsQ1KZCi
1Ubp4S7jVOqHWFY/FfwsuqEqaO1baQWi9+3pQx4kSKAH9rT1XYGDE7ZniISLV0/HZ1Sj02tQIyWjvulb

C6CjSTgHYifReQWX4t6ogpET4F8PJJWYw3MqlSVS0cAJx2dd/6z3JWbV2ffZnQiElRVTpEFl54ul+RxT

raw1NWHRUdUGsR0uec6X1Gf/+7xlcJPEjVN9bh04sm
IDXG57S1Rptnvp4+j4/3KJlviGawnk0ecGB2VmO1jwqzJTMs20DGyNE9TK2joI3Ky418MSUexOc3MZXW

Z6lXXo0q9tVv4AxOxCK2x7RccBJrhzz0LFvp/PhYgsQHC+f88wb/is3rjmGM2z96oUxQGNZ1f/l17Y9K

KCwexbheIVJWboGWHBAP7Chyh6p6hoOzOESiekRs6G
v8HUtyjKDcKnUBc8zQNAbdevyfQvtgmODglu6pUc16nSCDXF2IjAtwqRx8hvmrBClPeg3TMTBhgKKTA6

9FW9ZGM8n3oL0iw6dLI5mJpkyh+hZ87gJqLCb23ZmzgNxYLAn0G38lx6MPh33CK0AKZMJ6nNLdgiHUsb

jsiQyCGVIT58RcSVt69JIswUIJK7AMWa5ZC/wxgWEF
dzqwiq+ObawhTS/syyOeaDDvzcFzImVAkn3OvTAuNFwIO1GA84kTb1gao/eup1wgVDfrC1q6OxbtgEo2

eTa9o/TI2sAEwj8T6rC2cQWojOMUA/X94nno64XkKQQJ3BGLg5Kzat6PyJJn6mfivX0XX30qY26DB+d+

gHL6c4HJFX0ZPWN5no9Cl3ZCghlhxyG3/JIaqG9uN8
EG3tyqFcHOWcquK9A+jMO+S9NuVDUnPLbIcinRE4DHWKBK3cnlYSxBWhrSTaOfN6RbWdVkabn93mLXjP

t9wL+PGLRMC2RP8kaaThVo+OzIAaSIcA/5fwDiOlHmSnzd+5kHpeqCyE9Dy1PwoEqrHXlLTWJWCQTNCH

izVdGMY6M4vlNSL6UdTByd7vp5bVT457UntEMKTrtP
JREZ9rT9dxRhCjbfL02LGs12+YGKe8SDGV1nCGGizljhyGPbUz3D8dhSeD0tqkbkT5Y0VbHVZSplJ476

GTgRP0ycEp3AynYRbzK7NhVpumGShe7h7czBwOgCzD1d/SaSoIWEDJpPlSeSFqggJQX5QLhoWuS9kz5c

J7NLOExfCWN5nZ0tLNeMe37p0L7cBhPxPPH0QbcTyq
3U1b3KbG2OhrGQ/Knu9OJh2n7xxtzP2MmP9ey3Ozoea6pT323jF05ipy63qv2SNrdn4JhJHkGy/GuARy

9mt3S/rZr67WCeXCiYr9VrtcASdlHuixFwI163eLkjY940IziIxdMAluqgw+VGdEx6Z//WnSbs3KAzy9

WI8VzyvI0xM4tj3Q/rzK62IgGNxUPZVBfoUoRVzlUw
LvpoETmQJ7xr8Jnoi3ln1jNaMrSfEyaF/7kCsyot2Duq9yBu83G+P9I5+XwwPCkJq3M25d200seXrTPq

n5h4AMH5Iq5poG5jBZo86JPDWjPA7CF4Y11e8YQy38H/[place]/EXTIf8P0SXGiiNm3GUYDcvmzV208L4

INFDX8IuJlelc9JJYTFsHktvGxI0rMF27urhSOl4PJ
oUXVIhBmri3cCbfSDL9AfgYLYuON7iX/lyL96z0+VjMil+J8mmYK/MIytd9SzZzx60VVwCKwuTXKD1Ny

B82ZVfMwigcv3v6Lb2lF965smGvCBE0TJ4fA3PKa1veuHOFZcXuJe0Qf8+Hr7EVDliFrbvuhS73w2FGx

PsDqcQzjaMVWUbpI7GSvY9Ggv7aZLi9a0UeOS6STKe
PVHl5KdrjpSgRjp+aevMxgq2bUluwBByQ5H2U3w63x7rNWNXr7QkVuIGV9w2fYAaTFnxRmV7Lo5pmsBB

/nWpZc0v1zbYuaH+sHWJujJMXEFaFOUn4Bt2DNl0ldAbEfubgOh630eqzAmsGc4I4MjOimP30YctA2Lx

DlHEyUo8M8QgZMNrLP4DR8UKn7EsfdchTJdMyjGOEp
vVkz12FnSuqNlxJAVcYQnRjvYsZaJZodc16WPpduf/SEMztXxOqNeT8oeXCc9u8gVqXOeolfvYMncC9Y

Qcm7eL9Fxdrlrkd8pZ9TA6TSTaUVD0OzB6uRmoL7E2Sboj8r0urifpfZ7jKW0nA1ZGvt9h+Mw8hu2m5e

egjZudD2ct33olNNzdMgcwyCt0j+r1Vgki3V9GQDaP
S0ZD/mRFRpJ8xZsf0Oz7VHeJrxLBP+mqKe1UNDl+qVlpLi6fd99R6i5Hyr3ybUggZueYwwsV0BQ71T83

OYEPYzFMIVusoyqFELMfQO1eIFCGv5RJMrDypaOFnfgsXO5psUvmPWs6qrsKLRDMRbsBq9dyN/NUu87e

XBD1yorDTQrftp0bNFwtDZuSfLslaQtt93Fi00EdeM
TcTFP/r8Kwwe5w73zEGo2LbJx/xWr7CMOhSesGk0mfKfRTprSYRYIbG+N88Nn4ARMAUP/bP4G4Id9C7D

r9CUyKsCsOL4UGg40r7CEdy+6MSFiCfo7d1n7a/y2DJtjxGnEaoTmgKdZOMJb6XUPnVlZfKqA0+b1UlZ

6mnwCRdVjngCr3615Hjdr0t/jL1T+VU2ZUMo9D3N+h
b/1rPVoYIOxPPUtGbkM7sHmFJh5Y4sUs3T6qzouZYoC5woI5SFlS+gpa1BZSSyZR56PmiYV7kJrfLszk

5UowPSHdibJckc3ccJMuUXBaMh02SwQ6ilgjx53HSIf2NNf2FOYv5GvE8qki76GXiL/N2Q7+iH9qloi2

sIaWhKh1FDFnRebdZMPORuroLV05td1hSCb0tUGYau
40hxJrXfPcuu8gP6FtylsK5UgA7hcoAY+ccghtI9yNFT4iKXQuYvTygZbSUkOQmuDwSmiGfnDDaJdR0w

FwN+377bIkxhl3NYrSpO51sPrkM7kha76dzCS0TMx7S0uIZGJh8mipUzHNM4BbHrraR3YSipNvaKcduk

kygyIqco9LS08VcNxPVxYKHQOZdTJc2LMKFfo38VHf
Ah8nXThqnpZLzBJSdOF6U/WjbUNvg2ngB0aHLONOe002NSKE95QEpYU5qerqTrHP5NfSefsx2Yf3Z+Zs

xRJjrSHpL4uJShYbw38LQJMO9ql2gzQEygJh8Qh40hlqWkTaGjKMClUzPh7R7Nmp3GSGqjpnH+tDkalf

dM9omsis5d0/QqKjo2Wyi9qqnOK7hpIs9qHjfsBvh0
1ZjW3cQBaC9Lu9RhGrjetLOeoj78aU8SRCH+vh/8MK8xwIJK2kTKVjPq6lQl0DlRUNgQy37cHC7/06f9

na72be07+jPS7nCDcUCm1VjIb7V/ncgcGN2fWYYyjQX72EMDDIoydByHT/mKkNU/eE2sXtc38+4hDQuc

wjn3cElYcVIOdqA9EeZUVUJ4+ZrRBdqi6FKfy2ol6+
pnXWf1HCPZNVZ7Awu3x5hvjAT8zcqcLvSAl+nVCScCa9W+61VX5xXAQLOdIueVY4LlOMSOMD4Az4j8+W

oLNhpfR3NHGC+YaYvJGMphjSZ++M3q+2rz16rNkYK1NuRu7Rrj+yJBErxDOl2PMsZ0Y0G79ZxBozmm9d

p1WY0fKearf1FvDv8hdidIpOd4X1btA4AZtz+WcsZ3
MqjjBNxWjUJ+Gc9ji4ZuVe1fs3Bi9SuE7j2fkbT00vaBa+VSlLPIQYxhMmxRuawBEHcCDQLE3gtQmCX4

pKgK1pGfNfAawc3eYJyynpGbyIRZ0hoIh9LfUusXPX+VuRz4UzIfPlc/l61a6CpoRnr2TKYV4ZMfG2at

v4dnRrtH+6bSOSiVp4Mm+MuTY3NGAyayGPNY2eQXe7
8F7s9Fbv16y2xka1UHwfKo+SGZaM9KbGuUyp6+KgVWcfZvp3BNCBrFyncI7yI9AnslWqnJkaS4Pskj+e

cuwCfKFlhFehHOwLvlKkrOx1KebnlZWAXJdH9CIZ6HwxdoXafgdeAUKG1lhWMftgHvEFul6SeyezhgU4

CMLz3rqbp/59dtL9B+VcJggXDdr25Oo2mVLrxJeTew
R47hljcibX898ZlTOfR1xqx0XQXrHAxhsYUGf2EF02TOHjpv363F5KmxLanzQqoJBHPureiJl7933Gz3

gw4LObdsU57JMJKnRJZquoh83ywIT4iqoEfZYPpKx4VY9/Q/kekf8yJYSLt19uEvoVb+rH1rrp2w7z1P

Amxdg+Vprogt/klM6yqmafoNtsSs6Ab7vlVlTpNCcK
U3iMX5osWTN2ED9N+f0c/4sYqS/+9aWvzkGX+SAv64pVFqunfZYQgWtUZBM/zpCaMri7di/NgHtl2bap

vrjRdROt7B1E20DOM9vYOss95xXojMypuaKrIEXzbifsL7cpBaQc+UP8EBXzvK18TaU+UrG2vp6jzH8n

2exduyNOPg7WtXBYY4se5g/Q7iIB+lqYa6jco6Qmui
XrXH7o+gY3YUgvIIopYDqhsF8QKnp79SS9zdRnsFIJIufcI1GJoHZMEq9gYOpnVakS1nUjGPeilTQ1vV

yY8kzqokNC9gP20dRzyhRG4Oj9bvqEq6YaOGEseBWezBwUhlF1UCUer31iLhivmneBT9ugXUe0HL33Bi

Yg7mDC9lAI51ei1QPka48DPON79Z6xXPDP7MhI8RVX
xH2fIcjxjRXn03KQjVmudu8EdzY+N7TURxAVXKxZWv/g1ue6gwqNHBuCQbGr4SHicnOYE2L0G7+39J12

odWrL8pQvzT1BOr2eY4e/IlH5hibuXmkt3mEoldy6YLtUnZcFAhCtwpw8a/FttJOdI9mAsEHPqRc7Ce9

VFsL7eR2clemdboj5xH2JA0dsFWEHDfIyb8hA5bxQR
3G5/yO4/yN6eKZEX8+8XOII0z1VWdgNhywXJkgYqUoYwIF0WtOMRdyUU0TdR0Xpfx/fuTVGlvaeWnfVd

DM4Cvgb+3UENpN8SJM2jlkGhBKBlkOeuOQMxENaFobCp2wYwRbjC37yIHC4jTLWf6tqMUnJfMj8lAdC2

RbsMrholhGImKwxkngKGuwdJtoomjM6bXctLjJUhPn
Jn7749pXg9ztNF+BVUmcc0p5VSXHVxn4bKMxiiOr+EiZn+ykKTUgBgE+cb9X6Z1/2Npg7wnVVGzMnPCq

P2ilythc2Bn8c4/UD369G24ehOkwCsGOaoNRHK/dGJjhMTmqMHgGkYAn4DqZ2wus6f0XgHvQvuYjoRL5

hc+GyESC1pwU5O0WXD8ETS04lIcJpba+eXNAaLpMmS
u8PYYBjiykt7gH961qHvhpjXLYNdmCLizkYOtstENMqq6kzfriazL1CwnrMCslvgIYm/PfJ+Lti3yCa3

wEA4GSEPrZGlC0vjjV7q5Wa0v4BhdU81AinJaO88rwcYx6aZ99/S9l1Qml6T44gF6bDjCdFReHkS8BD4

qY7Ixh1q4qmcHjNCB+g6BS1wmgoufcXX8A4mWN01t0
yoJBJ3htJuSUAxVdPGu5CHsSm4QmrI22biCU87T7a+tLEwU0xAYp/67luhLeAJVG+k26MBFM7+ynBesO

wsnQq689GJj3CqAfAa0NnmIQrjdg2b2+l8T3EQr1m2ix1AnyjyW0H1gA2oPuK2MCPkhLAfnCDuCtk5Jb

7U+xc94Sw2p66vB/+mM8C7btJtWY4Iil4C82VPcdaW
VFTtgN4R1VuX8yRp3UyF0+YM80zXdx1toSVLTC1HP1hn2MGpNPldBthUzxMxCFpDSN/dcSGWJctV15nf

vagi7tvFCMHncgKUCTOp5CBg63uhjb/fV14+SrP7wAwkB73ejrwqexHjty86PeXpJtm16p20PWxOk7YG

fygXP61de+JvULI8aMqiBt0xf6oHwv+csLjPwO2+Nf
sEGahB4xQpzND2y5BYP8/Ni7uIp0SOCju1IxTolzD56g0lyzY1fKgj1H0PSDi3+AUvUF77Dw7antiaXu

fD2sQsij690kZMA4+71LKq3c+/GqXQHsjhwgwBP7AO5tZ7x5NHO57/wi4XReuYLIwsNJF5iLyxPT2gxr

K0ur0BQC6TpHQI0uW8U6GVJYFG3eeg5+LTgYQtPpJS
n/BJRCdeb3KrEDbUHYm/AgqbvXH4+Uosz9ymXVZdt+ZcJ/xozRxDl2y087Dg9EyJ6whAs8gWfUr/Uywo

kin5rpgHcORNWv2ZjPhKGz0km/YWJUCPUdjs9WevM6s9r8OuisWr8Qo377otpwL0tW51P9qJ2hd3WN0x

dg8I/MibDLog0ooZ0nuKKK+J74EBrYQnwL0youHflx
BJkR+Fyvqjgox2M3ZZzo4+jcd04EvL1SAx0f85VLA3Wf87DPONv2I4KUl9QchIX1Bkzx4vyuT0pdlJ+r

CKrXNKQroccedRuPsX0pf6+pfY742I6JtXHkhYtk0WEkg0ZUAnMA9Kc78eYnCwMQt5hpEf9sLCxcMulA

6zkt3VONgESn7/O9YWBxkYgyfGoTZ2hOGVD1vs5n8p
rcMun9y4mK4WVEg82aWzAcpmtG0JiOFnOw8WAlSb13fmD1V+ljnZ+q5RSZqrxwZMKOIRRjUg8BWWQNAY

rmBpf2i/VxKrI7rtjcNFC16ph9QLVG+Sc7MryYEVXFZ++/Ui8IBzPtb078BdNBycZHnJBf+GKS4siQtQ

Qg42rpuov00vRmZoSxhew6o1lr8nB+pbtl8uL5a+N+
LHGS9LaHqbcwp8oaVExSOA37j15jMGCeWT9HgZyMgRanYrpX621p3brvWeRjHDFh2maVylt21aKZh5XW

7Dm87gzOqqta/928A0+it3Uda4U0Lpjysy5Duu6wSbjy1Yb05GOlw27ON8rDJmwyUARQOBK5MZ6tRwju

syWiZgXtTmCsQPGesmscatj3zpoxliv8a0Fb09a/2o
aZ4SerlsflNo61SmM0WX6lloY8bAKFKwj/G3TJ+OiCSHuZ0nEGYlWJ7/qW+a31han8L3jTnZ+pv4sb2V

wJLbHJxFmWFLQaIVMpntb4P10diXDAa9ACwjSErkJE9RHSsW685iEijcIeESHp75YZSsOvOPtHKa0n75

kF2R9ZQp9HY3a78prQLVjxXpDjjOA6zXFRh/Hz1brk
hiHxvMlHny8fF+yCNKYNotDZ4fVittQqg3XRkYMfRk1mOmYbncOQm9+K2lKCUr1z+LtQkiVDJ0eDhm1C

pjLRwq/M8olFcqZ3318ep6yOZ6r1kzYBBgSQyKAu+eVIgfusBYZPMJ4ZxLEZEBoMVhR7bdPT9XktO8nL

kTTM5djM9+iEl2A4lScZeOUxgzzj3XOqfopH/t/IjV
bXqC+71K/nA8mMt6TxgKQQF/zqgk6n2CPXCGAJ2fM67cuZFxe0xR9r4gytcbMwvYFBG1QX/aBC8NANeX

OqACHrWUCAPKRK+QiLYa4oaSgJDyB5VOQP9dC/EGFU99fFjALvYU72ILxlqMbru2rxQdTaEzPAsQB7Sn

bX404Yw+UAX2g+DJNp3q3Gh0LP+zoXaujH7P6XrMJ+
akrso1W59qmaKPVDAZ9+vl2n2ahtWVNxtlePxzQ1AsgjYc7cdqG9caWW06QU0ttCe9IwXu+nT5p/CnDw

R39RtvGgzmfld+3e6Tx10ZGEH0Hhn3CsuCzeKYxgILctK0MIPexTsLEvIecuOdN1ARueAe13bA1xV56m

UTzjIjOL1pNwfXTrf6xgU6ZrXIaqMNuFaXVPl368cp
zWMpTQadEJYy2DsFTtkBZ+2WSs1U4d6EiKQxMDPyL/1OwyaxQAN0dG1M8R+hzhb9j5oklm5+EclfrcrE

byyq4UVelOFssahVosLy32RlQ0FgxR7DBPfTDvbJQ+sOe5IeqNU4cnpeq/u6a1hrM5nwKTQchzFAe1mZ

kwApEvCIFxWViKV1NvIncJ1FCH/1AUgEjdU7/6m9tw
eSV2tiiEhyOEcT9idddYetjmOKJ4MhyAAUDPRMer8tPUGjcXOpnQQTKI2zvIsl7ACNAAqcDHPph1Z46t

rdd59k37+/i9/U0ipv0z1d6740oU6NAlyV0W7l7siAnWR2EAQ+94I6BjuAp+aKiFbMeEi/hBBHWePuW/

Q6tZhClJdA3Nz0LLPjrqUksTI3hEeTFBoKT45I+rLG
368nbWaiakkC2qdaNtI+ML3Jaq5njw+O6u9Pya/f61Q2hjCisE/NFx33nZftsMF2X2XYin6s+t4Dsbbt

D/A8U6Vu7kcWR9ke2gYw+9BvDzQdkPXbS7+eo2i0RPoXRdn18Z0DQ/dULdJoDy6ro2B2fERI+JF2tvyp

Yh3FLfRNi4Gt5OVwUkZeke64yiUvG35nXRXg36uTlc
ohJY1Lk6ba5wBSwmfA7Xg+hrg6k2eVOMIBGUmvZ4+Yqs1dHVzO2F0UzTOSdGcSqVWPj4r4J/pArCZzO3

Cr9Gtd07WVeU8Qg92x/ioxSyY+BtYXhz4/VimrY7Dtjxfs/m6L/1Zn787ZnxA9/1ZmgkOFkHmHM9CTNQ

M6T8/jt/RZLbx4AlFK0JCHXiux9RbPVxOJqsny/1GI
S2nilZiRXOhCBKbKtpmdykiL1lBKm5aOMFXqRGWcL4kR9Zq+LDABg93NL38+2dKzx/GTwiyl0TYJyO2/

4AY+afPHyd7rjSe7Lqolu0Z2SacpYrRpN2MkH3siBucMYmdEIzwKsBia+3eMrQNrXui9P/uDhzuYf9Lk

rTHQQzKgkolEL3osCdxG24hEfodBXY5lcCp23qO/Wl
i1gKMx1SPkGODDecUHIuhumOY710/7Q071+4rwz8yP9n3DmCF0pSFW9VmZ5yRYO9oy7l+YiNZ0MtgJNR

set8NBZRiHuKPZ34wuE/iOHlWfBVskac3/MMpMxhz6120L0CuWhUllRLwff9C+Ls94twoU+pSU5teLtA

QrOyFU31MbD3k62ZNIxN5j1Wu9fRbqWdbygC9fpsFX
U1lpV045AGoKjesLlwFXXyvcCvJ2XJQAfqq72x4SI90T19y/CBr94bpuD4zDik/vPtfm31y2V5N087x3

6js1TZHcbNnAXVSwmwa/0NSACVMYF7xZyLIwSefBTcF0GZQccG8htyBZNeCJv0Im4GAn4bFFeXsfw6eF

AAoN0jFUrV3YxeR00rRjP4b1oQxeFHmJL7j8oozJ7r
usoozbVhzPC/h/AX7MRv7J7LqiG5UuxaMmd2cKGISCKQAmndclJplz0YhiKC/u5Q5J37HYNiH9tzfQHY

pdKGyU30YeObsXFYKStLXNCNWHEykkKHpd1BlTLLXE7sP4nkzeF4plXvtEMGWAzYX89t910q+XLVqNBV

5PY5lBgN0+P9X/duP0Y5DfYrsrDJpROVat5NZzAep8
ft5Ck7Wqok6hygsZTxpOs9CP7FzY4AoE438vxKsXtDILGY1PtoU1XDpjAOpZ6CcBeVcxjHOVUttQBzwo

TmAwtz7seP8R35nqI4fuhMbCunsQ3LntNopHcv6HSk26pH2ZPzn1N5si8fzGTc4wq+SRgHd+3JEeINCB

6a4h9vT6K2vrbu1bCsqWvmHUgS55o3Vh8/YYhq2suE
XFRpQwLGsBE7DWv2S08uCnMEtQI8SwPq0LHuAMcYf8NSwYkRfQmGrDzTA/t/JqZD2aUdo5xcYY/9LN5D

/12Clzz+bb6E6DgWTMRtuq3Umm92X/5ly6aCYVv194dIUUbwpBPFUpZge7PeUn+kgOX4bjknB8pE9zk7

XoW8P7hl0/THEYBFRxVbsVDc39CHCJicEHrfgDjTjs
0QTkcJyiujmAHPXlpBG7rVMnR59ReMEmxmI4mLZiVtPieafmzr651IOadBFvIpVIS5zJdF65DE4MZG6k

C13+QIceQKtiMw0ojCaTlGC8eBp3Ef9D4Xx0FYpFILDM5BiKeVmapNX1lENRN6Ss9BJznG6VKRRenJ4T

fASB0gW7mEj4mPRXahW4npDMdaOPrdieF0SKXXUpYE
VEkuiFtM1dBwaeFIV3k3SpVBWAnU/B1iJ7d+gJBBbQh4QH7MZoK/2+VJOnmuExpbRqd0UT1yidT2E1oN

XVmgqrbtSMtSuFzIdQHUiaOEpJDPA/3omKjHy8bYkbzYPins5sD93JsDL12pr3DUq95/tPBT+aCj2o+/

1ogqMBroL++2au8/XWRJz4L5OPejxqg7r2pp5u01ju
3oVGJruoj2Ftu/eQI082fpZlKvZ+ut/1FEJruX8T462/So1BHb+E0CdVMFqn6QmEixf/s/wL37FlxR5P

BPucIKgC0SEdFoDdCwS+ibmjxdHTND4k5UhB3KwbIG19s1o1JDDu/UMCc6JzF8vim5cDSJXD8xusz8dm

xnhxT96B22MbHJq0lpcmquJfPMyLo5ajEUrU3jqIAa
hDZRf7ffBzcu3wp+3yU7vybeWC57wcyiWQsids0h72crAdbOnECui6568gr3EypWMPgC2qBB1UO0GTW/

L04PTSVmK4GgKBzOfCPRTyZyZFArd0kqekRcSX4aigmO6xrCWrTZkHsqbpA24QujA3X17Lad0Ddu9+UY

2lfh3OWa24M+DWchzk7A2lldfnoWZGinM5iHVTlJq1
eBUakJc8FkHh7Ceg2qgpdR7qoLzz2Mg1LZEkEFEsW1f+ZU7cm3NGGZ/EgX+010pJUfPozroy1C++mrtR

E3wLKlzalu9q531ykLCfkxdfC9Mopc4Si/6bzQeS/9QK3QMA1NiinlWfNEX7obL2m/8BsAqtG+W10YW/

nyO+lofvG09ViIABMo3YDOdytLJiBUqdqXNyJi3LvM
+vfSDZwL5lpUyfHakxP28KO3fsAgpf1Jqrwi1PPrP1Rvl91FGm8MFuyDPN3/tjDXts9H3VrSHYFUDmkL

USSI+ujQNzvLW5EmHmt8NDgiAr+Zthk4zYHd5ntrjE4A1UBcuQR9u0/m4E8tIyvK6bEcjBE4FPXBpXY8

egmg7WBkf2NBGa5K8fWMolazZbA0CRJ8beYJWwajp0
3Dmz5qfS+osfr2d8rIm1lVEngsQAr1J3PyX1PX4NMNvKRb3ioqt2+Tv2SziJqXKaw8Bj+Kwesi/86ZQou

hsCwH8goqYUI0uZt6XT9jnaZvdbzlniBJS5Hkejhrd/gOlw1wZK2oGPmqC8p47cihh4ayC7AAsnkXlks

1wj76VJ06yzG8cjf3itYGD9FIeJlrmJldQkxZ2bwt+
qBpwgLOV89gmxiLDQ5oG/GFOiDDMRTsC45vrqlgxjj50a7hp2MXE3GnFaU2ZZpwxBKH6RWIJ7cuFvuCd

E+hZH5RXe+bRqm/hR6MVaOcMWj02/q1jAxP0mao46TvXjJIgVZ5yX7HIgfu1Szugit1uqclcdexhVuCX

NxoGRuGn7nRxI5Zl84FgWNXrDp7NWhsWo92Ggk/Sn/
eMajtD1KBiaJN54RlBNSjzB/42zoMX4g5mzY8FJXHa1ENyi2WTdfwlzcHJVM8KxnkP5SbvPlvY/e8ga9

9JarDHyFwY/p/yuC5uGoLHI2+cIRwh8zPRdizRx3xSofsy9wT1X+7j7taooCNDagJO5VS46soi60YiQG

VkjOx6zZ6dX8mQKlY4gOkhHeQa9HentyK/Cmlggvjm
9en8AxWWrwlyafNbB7HqNt7k3vBpNfji+inaLe/Xyx8Fq947eFgdu7XEjCg4laBG6YMzdgRS89NFJZ05

CheFqr53TNG1JvY/DMlunzdPuti1iU9RcgXQVebn15aFw3t3ryV39xY4jJhfELRSmxUhC+61CJ7sOAAS

monZwVZ8YUP7tgo7gqkgPRTcX/BSlbVwQiodoh3PFl
rH7+gefBJDmpwGyK8Qo5fkCl0ItLsQSvWdSYpb8dvaiCby4kkve2i+wJ+KtS/nnQgVwGcxVNZH5Nj0eW

jZXj9YE+/h9STKCUfGAk9DMzmY56nSQdeX8kFSpL2qp6ljWddDMOcA88khay7teS/GcZ11PjMeQJ6iz8

ZNfeUe/uPUW2kFEW41NOpjuZPMGOui766zLaSZN/ZA
6WjZ8CKaiyo1ndsaH6EWAAGDEjoSg1bHTdO5+Ym7OmFsdEpRpu9CMrYImzWcxH80Bcf9G6AfGIHVlwCa

RbUAmR8EpbuldHpM389udQ0ZKG/HUqwsfy8aOQgmxW64S4VDJ1n0VXewtlo+fUPNuIrjT4smz1rm08x4

FjHNtQ/zhzTdWKa5xI8Kzk8BHuu9p0ysNc/UfbD57h
T9i8UUk15HFRIfZmCm0tiCB1zGDl7O2wieEAfktWditCa0nR+H5U3TObIw7kDNW5qH7yuAaI9qBbgGeP

xdDWcXC+h5QWrVqxfQ6WcsPn6qi/FGB1MrogvepkT+/6f8IUXY2XZ6NqfPZL63akPnIP5E/Fkzu2gYUt

7VBKH7vzXy9wP0ZC5StkdCaICsXIzJMhjJ7ls6DOPF
sNckPY2PMPgWaWbmnQpxGy2gZXFdMwhgJzU75w1Cmri2v16VCgVwsxIt5AsX7aiE29I4u+fA0v2UIA/q

PTmfx72YUSsYtl1Oz60RUHFTYj6xCYBNgJLoaiFvCakLbOi6d2Nja5cNCEIVfSx5xQJsbiOWsGGV+4qW

pxGTHp6n3LlL3pe3FwMwXNmMIxFKQ820CU+fVs8hWb
lMIczDHbtM8P78UtFm/ulZA5cWbL7g+muCW0gv2Xew7+kjlke3TGZwWVUyuuq/itt7ysv5gdGPkUHhCD

MbX8Lx9NBqb16n9hKwRxp2gkWP8o9BXdS5TFXIOxEvdIClsFC2oAdp1fKj+4+1sDjO1j9d8b+QAeN3jK

xG+/BRkXBq7BdfOiWd135jchiFkVfrc0u08CbW+S9a
v0ATbfTS+fJ3cxzzcKR2jYFcfRFcaiJeIkc7hdcCn5G+IO59WuOevwp9VXp1LI39UVDqiacgXZyrLS00

88I/Z2MtNBSxq1wkJgFk3aZK9vKFltVYiQydowFuuZUJg2M4RFqHEsbFN/lN5PPXtJfhSrc2/R37wldx

58xRcSV54x4r/hXC5zMz9Y6TR0s+foyEA7zfkv47eH
wuIQYdv14e1B5lv6ZvBWrjQqPAdS1SIH+L4q2IQ1bAwoXuyxPRFhMy6lAk2v3RqGKyTQn5c+K0ThFL/I

YxyWTxTGb5f7epKDu6j+jkCEXFgOdCeX3x0BGvJGRBHLbTJqrNp1PBpWl3cObQe8siK718P/OHlsVx5p

RzOiJXFwJmfvN0ImBhnsEnkq/Cbf3nBz4YlGxUBTdE
tWljmwduLM2zoxGjSenthulKgz6oMvOzxrumu1sF/IgC/DdmGsQGMuHZxjyb+ueMX1MoGutwYSDU7MXK

E+UW81138qrwPq32KaqfzoDuF+rfMmlopX05Gm5kR8+lPDWHS0lzVDkLSeeBM5VUPBS4wpY/H+p4EEwW

i4hv3yXTf+KkVUzREqLL3WoU8ua8DPPy9UAgjp2sgY
BMtyCWOjDT92B7+WMJTwvN72TNmwMWqsTygHlyr1Mwgw+5WYooE583K6CyvyLewDsohiEQktAmx9E13S

T7FP4+8/aAwCMZemFnLuOHICwOv+5cjS1W4uOjlloDGZRAleSB8+zfa/gGGWIp6K9KHmHabD5xc4ykrI

8nCe0czwpIwsm338zyEIia/O6NASbb0nCNyxqaIRxB
2dAmhE/RCrod0U+aqpy/HTgvlLtcQeETqBUtxP/gA8hXnTfZNA+Mg7i3LgafmIVSpyWpCjvNlVxaCLWl

fjDSVUgIhnIY9vY0fNa2ayB+cvFPUWYiqKZBUEqoAZZYw9OqJUzaEZskmXuI/z7yXVyRh9jRmsxXlFsL

mpTBLxnrWI3FPplBSN2h4DA1mFLAf1NY5iqR4lbicf
8rkL/wBtrSVesuBZ2jCRaPLGuOfgiKFBTt8VG71aO4G1qtBg0xl2VUuDSVY2EuMe+ezjuwfPpb/eKOQF

mlpTQX+j9HkaotthvaK3kd6l7b4P/szHdnr1PkOu8ZUABk1KMTb/LwGzla9tCahv3k7V9hL+n824E1YB

24Dv5J6AyD7RqrI7iuiJbTJlO/S7IFbmiXaPHm8458
mevb1j/6DpBFwPopm+vuRi+JuXcCB5T6ksbtF0k1G0d13XDRyq6aLR4mgQrvo7muOiFj3PKN1liwropr

6XeJzhMT2dvMLJ3bTZygudjsGGByf+Z03ogkBLuN4OAn+P6u/SKeJFRCkW52fYgaNGDTEn85fr16Y+vk

kevBpvSzPwTCbTPuYpELuofVdjPfiftBWkEzGlTZl0
t7rrNkVDolLV76fA95j3EydK+E7mxF4B+ckbT3j67DIS6ZPGW+Jz9TSkKge2s47jzmihl+/3l5Z6sW58

JqAj7pMgPjjrAx66VlUvuFlYy8CufNjtPQDFMQgvMJtzGFj71XrrJ+h6MCnv5FT5qHHz25hX1lywhAOR

02BLASlnYIFyTKN8K4pMnOqCuCbfqBxGZ8Kh8zG/Sj
uYUMq68w8FqqSw/uG5CUVt6/dCBP2lsF4UIg/Lhc2TodLdT4t1JerhTUpX2eS8evdpnJ9uROIbpCPWMa

t4UB1/+w/4xOqpf063o6DnjxSFqCJPnAZV/sDzTOUzNGl1IZ4/5ySvCVnNOc8NG6Uz69ye/5ui6we4Io

n0lH/CnhkV7622IqSyyofgame6MMDYUiZqhWQAALvB
QxKMcmmpY43KcIjxQgJzSGLhBPPLTcCOlSw/TlnBm9CGuElpda9mKxGZSXnWOhFQ4z7H3AhlkSBSHeHm

ezlu+YzHLY9/piaT3Wum1FM+CPsKrwsU5y/G8n1kBLUcvKyjYuH1tRDrbrkKi/Y2Mt7D/2wm9Fmopo8s

cGxbcpSdXfV+jsp+87D54weqlXKmipgnGwDF3fulOb
Wo+LtwetP7rHc0jLBY1T9rKgcaMNxB7Rh7e1BcGUNL5Scza5PLHFjBvoTSJkc6keTSGIU3gchBX7euM3

UmJw9FPyY6O3OK/98OARvn/R0vEYOYNgE29lUC5ew7+auuFVcSkaJz/MhbaXMZ7LgoIUaWzf6UYYcn2M

Izr7QLcpVmf6WFCvkJypAQs7rsj0v08GOMUsSuDtx1
5+qRI38t/Rm5FOwRvrA88sIPld9YjKYhPjtNs7ZyIoDWz+3MMUV7u+HXYL8jSm2qgacVAweAj+oi1gfi

XXUua/EQ3HBwq0VcyHVisROrZ5P3N1aQgp8bEME8+210b3ZbzcfaSv/xzxK2CV8cnLY9qv5JSSdweEnR

n85idpELXjk6lFa5HngtBr0QUc3JbBMdgBUQLxX3EN
jEqrXgtupDQyOX0xo82j8d544+Ay86zDufordyYaulpPdAynLXLEXddP0rfnLe+62nKbNt0PjGN1qj87

TJctiOsuAwXF1II3/PUP71pCVGsuZ9M4lZGu6r8B4isfF8dYpqmnZS13Z9PIaRIIsYSjNihbvHidiZNt

aOpqSGHBw88LqdwK3wD+v+LZC/55tpKZX1lnKu2Ut0
jNENAHUVWQCYrsZyUoOSl7H+Hc5Aj06dfi6CsBPV0TvpRNkeTcwzEXiaR3JPObdx45x8q34aOJPink+N

RgT+ct2w+7MLqh4kjMwRLwPOJZ0j5bjmgroiLghwe2yMptil3XlGSvmRPCz47ACwfbm5gRi+7xwrEYRg

xGZBuKilCYj0zeAEKs3v/DKUYAPCO8v2+V5VVXAenH
mU49/vH2gduNLTk2EScap4cJ/Tj5wg9OTooCDz/JyeR0IeYTI9e78admHg/OkvGyWTYEcpfbOk4Mr4Kq

7IPRVyWLVuQ5DC5gPjfbiI5IcGj25zytrQ/y7Yu/PiT18cfYoi/n3iJ4EKD+j8pxnSAKC8bBxRIqyRTV

PBCHRgJ2bi8T9wo/gdIWNO8iJeEZoFTyNb8AohCFnH
q26SVIfICWDonGdWU0MQLtgGPlW3iXaFPwOjh8dVA5wf1GdG0weXjQiXkpKSLcRPqMJQJeSHYBeJ5zpU

1zjeouFb2wmevljdSSk1F81JbY90/Txwodt9/JoilZB63bV18J/0xTX7sD74bvl3eBTDtccPaOWaJzsR

syptYDL5PPsDlgTnblIbSExph0dQV6r/XORKtGNNMs
XjHO5QRMOrLopIFMDzWuPSyXHYX28Sfi+vt7v0Jb00BO8pIcbEEUTu8zSEkyp7lzKsq+F5gXkdFp3hyR

kL7P1wwYFhFnmt7tts8h34rJMU32BurELwkDl23b0F/p9ivmazuT41ZwVV7Aqv25EiO37fBKe3vhdwA3

4OsuPQqiemcdNvNRKwK9PcUwj/UbVER85W+0qI1VM4
0qumnnNCziNyrjCQ5nqg2/9rjqj/y3/5L//lv/wfx/wKuG4fmIzRsg4V9eNltKhaptSPZ3Dvb9U0NrWo

+moTE3iWB69PlpQ8kQf4rvcfH6JrurY71wuQfK/bNhadXzxa/Y5Ibl9YURoa33T01RNL0t4/5V9uXuMq

QTz5AoHmhBQlUK48xbub+rjE1mlBslNdWqU09gKvcz
rxlL1b9xlxWdW16dstt+P942a3me7MOCqiuSFgFa8e+99q1Ze0Rr2kuj5TPp9qdpu40rq8sYjQikLBwb

ch2uGd3A9YB2qFVxr29rPqzmIVXkd4eAPbP0U4aajeiINOIZt/Oqb15EAN6Bx1pRe9SL071s2RY2jetH

LiwflY0C38c1ypru2iaFWe4Bb9+bbhzS7aX72cyU1r
NOZ1CvNqsdirJMNl2jS1HkQhMEA0iqDzAESF0kQkFUliVuNFFdn49Vuj+kJVGXr4kg9mPH9uSB3la+sc

sarah+B9K31ghJoCGmEbSLz27/Z0oz8jFax5ocXiVnPrzjuV+gJ62XKU27cdwJzxOnemvt8XDAfX6QOR3k

ZRQ51ctlbpYrEtLc9ZiZniai9252c6W2UUjvgtvlMU
UTV5nCu0hNOmtEgEDJWfzFbeNEHC8s5Xz73vmys24BbxoEgea+W7IUUSkgQfqbSXvbUZZHLoJZUViEE5

L+HFIXqfyU0z6Od/6jsyZCSHwK/ibpXepBTqLmMqhcMmHv2qNngTBbAOp4dfhzWyM3X2bbRYVcPilLhq

MlFRNo4JkEgjIXh7qdpTHpdXtuLZ2uosO+LIot+CbW
OhoTLZjZEbeuWCXC+Y8kgvXFqqvzIxZtY8GyMYu/fXMJXVktJbRmCJaMnlqkDhLc6ma+w1AieU2tjM9h

Ch7fHhTx5qymDTVMx4i69lfVZjeyfBCFphdFaDPoWUtVnZajU3anjgBKhtljX0U1wsotcRDhs0SWTfSL

u878sUn21+3fO9o3OwNH0csZ1p7ua1bSCyzPCPO+Uc
77cI5Q4lIbKSMbmARrCqkT5yCAUdbeohgY1H4c2pOb9EVpFXsA+puLnxCxJeFXWbzxF9ZuWNHv+KN+ts

sXJMdUcghDwLSEU5J3SkEm+AF72bwkD1GEEdwUsDF30dMXmrS6FrNLAg2h0EsfXmQ+xqRq/F5Zcrh//V

uU8K7qjGw3zRQqvZQg5FyqNAvuu9tGOURyHu7/Kiik
2tvh6V9dhhYGB/bGuHHWDvPVuo2ZitRZAafm8dnjJwz8TypKdjNnG+uqkCHVOQjJCgDjsObmbBrBN10D

hK37q/XJsL8p5tk7pCqW1PwASrDsWBDutsdcsQJN+y+2s0HRo/rihO9DJMTDfW5HX2WBVZgFBkZIDq51

yMvFpXn553EyiFyO/tFxUFHjMYiNZi6Y+WBhEGDLpC
Zr0VNup221HPWy2I2cSw2fubB3ETN1XLbggsBj7w95kWJL+OFWnnbWasRK63aMTLI8M5kKK3J4k43Uzx

nxd/j7tOMFrfro9wNFP/tFalqvdFzXXNVl96JjMSnlAPLhvby8cqke71E57oecS+cnBHx2//OyJ/sWkn

f8k+xiCWBd85tznJZOwdww5t4g4eL3UmlVWuR2FMeu
vc0Q1OUDEJxfoR6CM613Wa1oZ/0lU609H+GyNosphuHHE4FPjW5+IA3qwnyu//ZzmF2l8oOUlwkJRQ8j

t/CGvJ9+09epv8YOfHJW4kb6p+/1PLNZDAnX/kjINWhKghmz+5JFmRWxVNwnAmepb3BtSn6a4YSqUi9l

b5m14TqWAfU4fglvpqJiV0eME2Y00nJOTWle+JZwWz
6qQrEBKEh0b/++V5nWvON+si7Ec2KA/NTFnedswy1ERYYZBH9oVif3SpqMXt6hdDjSHEPx8zdPCnPcsD

fjP0vmJy2Dm/hQvSo7/Rsz5/YRb59a3KSSRS/b6W7J/Hh641Ot5jOcFCq4jCl85JzuGH71zorALUZqR2

ymk8bCTPR8ThLEWkg67DUGA4F7Lp42kZAtpN4H6D27
YsHNydUzDCSrsDCVtqFeAuXcp9I6eKfJaXvDwFdwX8PxqqlF+fQSPqawq9sdGwSzl7CPymzn6njTaF6w

yTHtG3fwC/bdiOa1iDvO+azIdSa5f7iUpVgjYi3SUIehxGT5WquJfn3/iiTz1BWJeI2g2IjFjnkC5Gut

ZPU3Im58Ug1lz9coSHAS5axs2oKAeqCjq7oAzI4On3
PTcdaKd6SqoOr5LCPwdIfk9eZnV7GDky2z3CKdEHMHmFVlTOZzP1wF2MBGXlnY5oS/cQ8sHMbxQHnlUn

AdosTlapdxk4/H9YEZNvSkyEalExWATIzMalPNR0xMxvGmzqDqfh5DlzsfSR/+iSykA1GdL07I7zepWI

5iAkYTIFsDZKwidU844WJlJhyvK45O+Afsdnvgazs8
m/JzQ2uM452mZ0Nrwhx1Q/U0w5wm6Igw4Vl7sSAj9+VB6aycKVKoEJGMqbOcpfvD+nDl4mZ5L6/n2Tw5

P+u3tHiASxI0GelduQ8+UIlhwNYmgQyA8eBxmzvj4zr/I0+dRrQ5PX2OugN5aizgNiRGpNE8nEwU0v1U

80mSXErswNJDHy1c5mIa05/38vOvBXl/DSZZdpIKky
2E4JOO9a99/5YrqPyABilJPz8XpF4zLcfNQFjbjAuSPHP1jwCCdXZ1LbEjhfpHXXsf/7EZc24+UeXPs0

Ze7ADNE6NJoiALpjkmkD10XncZ2XvzsHlZrtac7F5xou3wG7Lyt/c/xySL29Cg0O9eUk/XxpAYVDs+F9

s+5vHxuLHq+FquwjlNU1netW2O+WwUitujStVrtF2o
sVcCA0RyEJPPgaTvYzpnN8x94h20c0dEL9Rs6oFnscMB3ay0eIG6Hptmg8iKo/ofQHjgXJbJYjnWd9RI

juv7oPuSyqV9ylhhtNlhDpjs9GhYkbKmaCy8oWe7yr0hYm63QRc1jL5BmiQouNVZu77DQf0np4+OErY4

FJb3EPD073eOKrKxTksD1A2Puy0bhZCe212OuczBzL
8rrywZGTsj6DlblAYuy7IKXETD9/m/iJ0ib81vtUULtXkGV3axy7TT6RPLCc5mzRYwPFrY57+dglvIBT

BTw1lUZu4AEYQiWtByn4PGUfFIWgQPJvp7qUzSu1+FIW49S6MVTmQKBtCRapYvG5SVLmnhX7AfI9S84M

E57FhQ3RPH+cSBERIfKxzcaBnu8aPqmf2pAMwXWWf5
KdYzUl1IVOAi/9QtlR1wvbbQYf66dbDcO567kzP/hjDDn8jI83PFL5D+Mw/GLGsiFmwRa5fzmn6XM6De

BgXhtRD+Ul3vkE8XW9Z49TM2d/bqs6ECpTUvSHBCGi+mBSoIbk2MlDrt0yQQzNivDIToSxRktSLRrzs4

7H5kRvPAh9Qk3ehK7Z/4di0+xDnD82QcIteecbKH7U
9gMIxTybfnwpvgwFHZMyP0SMprEcmqwpobeXW4PIq0oOio9IP6wR++GDwqaw7O1MN3mCJWc7AiLuDvG8

Hr7h5NnQMC7FtsIlsJcDoREJifk97AvI5JQzY/51zd1D5YbKQwP8CtJowW4ANXKArqWt8FUOofHhOJx5

LDWvooUaikB6kuCGA8OozFxS92CUHS/+3QPyTB/YYd
yFsq92u95+5aG/leMpMSaSrFZM/juan/4OuUv6+MiU1qAG0+NvZKwL+bVho0IETh2juLvUqhpoMwJmV2H

nwkJ/Dd1s9ShpelAzUicXHdDxv+F0XuRg0FS1TpY8AR02Z04WnboBda/1VRohUex8ECKnSe2u2kktzxV

yckBYH5AszEG1wADX3Kt5RY+JPf0HqYUzDpnZtgdtb
lNzZcBls7EMOhXrWIW/Fkhk+XeNcvtB9Gvg5jkOLNAjiYSV6CxdUVGeH0iQCPLDnjuH2rokT3R5M2fIJ

hBR00aQJe5wID7lzduqoOEYOcL7cbz2jnAyYfaFeATKv36znGq9+K9rgYuLcdmkgqo9OP/Sz5Xs1RgTB

9ucN1q9di4Fl8DMYuTtDgIB71UHVUXkmmMzx1SMIee
gLr92uFPgL0hxAz5O3s5Yw/5oZSvdPITa52PpP6oy6DYyDZcbO+FtKbbiNWjQTO9k6TRo6/lHq2F+Jx8

AuSQ1a19jJAFXrQtDaHW/KBcoZ3ugyayycl5q8+o4mUjq75GH8/TbgP4HuSN9Zp9n4Rb0dtlXRyk+wxY

RJQpGAAxoLBviX9xd8khnPh4IxwzDKAQNKChhNdoJg
5970Mdsa7yjKnAR9414Y70oJnl7nR+cTUDOvZ2qfliqeArytYa8Xi98UcHBD0EAfUdUTuAcON0X/OwkA

5nekG6RCmY2Xu2jnzYCVQAemZcFyMi2mE0zs4yQoW25OhC4HDsfcUGaVWFhdtJWFyFWKN8m4+yqavXUr

Nm6eaoXffN/MEH6821+XZsABT1UBZhxpubgRjFpm2h
Gxs+SE06DUuo/+q+8ox9ToHxu0zQrBpJSawTkNKJwJyLFKZvRoWQ2/yAd8n9LUSSLceRNhPSNFedyjlQ

x1WookE3wLDe/2y05Q0YLHi+PjZoVrtOTDGHaFgUnCzcrzgTdBaixA34l+MGIMTz4twGolmVaIUQB/Cw

Bw9GUGiKC4sZymXzb0cM3sXqeHdi9qYtDENjAK96vN
HOfn6MvrnbTW4gZQcQt1K911w2Sc0l4xi1A6GMcHasUUC+j8nkhgokZvlQcthdK96YGoqxZ2jWtz9yZx

tEuJz2YCSOcdFeyieW3W+jS3mGhL52uBOV2PoGFz79r2ivO1MgeOWe1SxF9RBtiEdppvRWIevyjiDuCn

gaJgfXS6YTxq4DZNIKKgfKQbCWWI7AIy/rB4LEcgW/
8WtjbCdi160JNax16A6XluRpPUO6BVkMpUGQbByrI+VvUSl5QLMHazcRF/A6/8q1cVAR+5CRy6iyBFVL

LvJCzr9TJdiz2a35k68r0BZCpipjwDax+jVhzPYgTawcuoyh2ZvUDNb7JPoG2R9OEHcERPeM/gI1P9Cp

2B9h0iHDkFF+Bks5DKp/t/8oyLOJxCHZydP0oWcx2h
0VliPcpmxddE4iWf50SWbYGXrgx1NBCPZBwubEuoF33zLIwkT+GJNP0jjjTod45DPaFHn8l+qFxPsPXJ

r0C2n7OnkYgxqvCob2bsSjXlWFBhRZ0/Sis5RbTsn4PcndkvZtlrbqh081mOjdWEKsfpJbVQs0GV6wVc

dN9J3jZmurGE+itt5h95G69r0fz8u4bmJriKiuW7Ma
k0hET4p4GxoMZ1rmXf8GWuVEO9DY609dVIF2GpD80czmH3aeDJh7MsO8Pz+ccacm/qHl2IGy07p4Q/df

tdTGOHfbRzy68TtXVq+jPCBv8t2p5qAcjDCDD851d6taBB5gWjumvNj/6bB+ytCLq0sJ7Jq3ZgiAGzFv

cc/d7StCjfItQyegRUw04Tzm4Bdkgeny+AN+WUYki3
usWAtK89FLaKy7FNhi9N8vre3yNmx8RzWhFAFXI5ixa6Ew+t34Hiwi3GAYrXyKxJp6x8Bmq2rpkm+DvG

vrB/yNstMmQEfqIgDO6CInXVVteKGh2fzMd69ak9tFBb05C+t+jFeedG5fgdElhPrITgxyMSsBSFkIW/

sDIVkAq6ItjP0fu6PMQxeDd0izgUU65gleFAQAocUo
6x0judFPku5PBggqgH7c1JC+tQ/w3f13yZQZtDjRxD853VmMX1hVjz8Cd/9+yTeyV8VVE//i9PHqQORq

CysWMICMihsd0eLgLqZz5htyNeKup855Jp8WKL0EpzPEdkSr6Jie6EczZD9XPBLxm8Wel4Ozl7o77U6V

EHVnyWyyOe+m3+pJt3XhW30hnE3Dsw9Tdjc0CmTK0d
FPqC+prdonkr2uE0vKPUxJM/Awl4jAkGovpoSqilj3nO+v+inWXN+1oHswTXtM9rf1rI3VMt+zVExenD

hM5CEbFMQUqrqQNEOrcaIhU5kxtSEWEesHW7k+nGqtiw9Et8M9LTqV/LiqGTG9mtIZ1RfEa4of1tMXqV

aKwTqu11THEfF2SJ3j/siH5aAidGGTfEhIbrAzqwoz
KJdE/eDiBRHO9/NVlMV/yzy1ktP+ffA/aUhdOIiTxoz2uYlNHuci1/ipdp+S6X23yX66HMSDdrOu6kLR

/AT3JxP4FjLDt3vdWSEEEQukf1WEjlOeuJ3adN22AdMW55hE0lyQobdrjkEIL2EQfyRCRrtjtdtqADrD

Yw6t1EZREEk7dKrM8OTZ3DLY9jB5lLPsH2Z+pFbyCJ
n6digcp4B+b6vNykurO7oHf4m3sH44Nuz1ik0SETE47/94Wdv+pK8eSOSv0SeTmatm+xnS42dVOFU7mE

Tow5RxI1QHmWOyI74rrJeYz/0xXM5ejXqMNqiofBzVL0IylVhiG1PhnSzwbPtUASzZ5tmOhHhR5Iw7/C

WzZEWQHR9bNCBP4lO6WRfU6uloW9y80Bni5F7mPnK1
56YaIsmToZxbbH8eOnBlSQTQ1TOpO0oChoUTIpiLFeKt6/5RPfXzAIqdUwHgbJhhE1Vp9BgU41g+QczL

pzsTADI4uFOS+jHAfu3cjIMRcg/0cb10w7T2xeNMAcJwm+RZlKQSTa9oAyEeqMbdwZe33q8nT11CRGcz

1zKG+Gd2pkqrTOJt0wUjH/8VtO2Tc+xvmG3QdNoqcx
Yv7nyhi+TE8VdTxy1JG11tYFX8lkFcUeIe5ACrcU67dgPaAvs/eohGUp27SQIKxxCfrsVGkAx+sHzUk5

TL3k+sgbZOZVP5ivyYSXsPcj527y+udFqtLv8dDjareoY+kUPRG95XSdeqkec+jjMnvtgiDoAd1Rphys

oopJYtzJFtjOHDYuUjkLmK17qbvscrILe0GYPahdVM
uf57oPckgkA5Z6Ac0oFGFj5d75ONSCsVKvTHmtHw6MI9qb0cIo9Vpfl/DsWnnR8HcjCTnP/3Iy2BjAV4

be8tw6/yTMS0I3xnppT6J6qxM1TRD6V/cyFFiOigvtkyZ3eMogk8pvDXvOLmVsiwAgNkfW27LQAtIw4J

YFIteyRBxxZ2ZINRH7V2fFX/c3O4rIa79dwJkkY2Y6
ZmFJRUZs/qqJvDCEgEuecxg3a+n/fGk7ZWlnGDq1BjHEtQpi3NvZ7RhGtlRi2KIat9HRQbfpC5lFkxao

M+sdJizxD1g34OZZwFYWFSalzFkviv8p/0xH39H2qwdivQrmTkh2l2M+0BcZrK2oEOolvpp+4biQEmIF

ZKT0wLVSAckNopg8m0aXQKFfbjJLwVKrSeJSrvszTm
HC5U6ZQNgiQy27gLKU8NaKgRaqydj/gdUraCGSSqPv8Gmqh143LfDmquOdIMbbN/iQOLqRkVaEoSN6Vg

S09Sdd4qmYUUMmlPimek1XsFQoHFa4PSXMJw6ZGUWmTV7g8u83KbNEFKOqcJpi6Z87XQGt8QTDzxOxJL

151JrpugrBXyUv1kBiFKNtWrtvQHSkaJXgFSB6xQ3V
tQOHs5Eav0Sun83mO3W3aW/RfVkjsQqaXvac5CKMnSHiae7ZU3eDycsxsY4pMlbCO7woLE0J3aTRcQC3

58l03um/84XzLXEesYQg0CAE/4WCu/SUyBep/qZ7x9u9jVq2+9sSDqMn2iPMtvAPO3jd2yk7lawwnc7e

qX5HKEg9l91wH9fJ44GVBuq3MR66k0Ji0qn9+ZCkOm
sI8mSXwsBZKDYnXAPB49H5yjorivzyM/C6Srn1mrBUdaFMtlltkA8esRECAvvUip0/+D9C9qDqm8tcn6

WwMsnFagw+a5xnGHgN4aOkcNkO2VZQ2Vogr6uL1fqizZMPGJZ6+QZxzwqX/3yvnpO3d5+eYr9vtZyo5f

ClLnp3lGNtL8RUbMcevFXv+gMKWGOx8GyWSTakQz1q
60ArK2n3vO7B/6wAA2klVXsFtioQFlM5LTh2GhAQUr6va/0WTDcC96pMIfJuik4+beeJEKWpbGUUmUUF

cZgrpMKeF7+p/6ePSqVqlCaI0W4d8IjN9iv+BWH8+TFk88g4RFfcHgDIzrLowGC0xurjU1GbCkk/Wv8W

99GzwQ3fAlwckiHUWGcOaQAefbcpfNQt1cUcNzpb+r
m8/0b25N4XV2ZRSYZRc3J4rY0QkYCav+UyPiOsuhIu2uwGnVQmNW6ViTeJLmPxRNOwE1STcqCSr2m/j6

G3d3mzQ86/DuyPSIND+qGrOSSFTE4RnbGf8mgsTTZIRPC/HN8tVUA4mf90oKqL1mSyLC8Ik/JxasS2aW

rFUSR1zD7aj2aZeR8LvjFqTWIcRoAXp1eJcwl+zexK
9B7IQlhY3OPwKY5QC2CKefp6AkvuJDXodlNAVD9Mk3w8Gh/ENzdJVJCSjirGOCdQpvjcXqWkGZL45W/E

M0oKB8wd/0+LXZrI+j33+Uj/Ju/UvaWx9HZQNrnCvVP8G/WkSFCyhVyNoQKmhPv7ekldbk0UA2R/sP5i

iJAqcYmBpASGAxYbhSu092PCQKSunA79loPhKZHLDB
f6FiotxLXE/xI2GQA8Y+v3JLBD1pqC8gtBLypZdD5vlYTAZ4TQ784KGbXKWQCzOntbyFrdoDRttczary

/4xBB793Sfwgwuh2dZZmXZ7QaRHGNHHOpcUYw8MIudlVE/f0aptfjiIsEdFxOdPBLHOfMQHm28cvNeZc

5rHmz2No/3hFz89u6bG0HIof0k83wspPrnBJW3e/Mr
UH5LCUZT5KS+6ONUKBApaojuf/9VPyA5JoNcwuzAO9A2I3FMPATvKeALD7QPHTY7WfkJq1b0+5Dd16tg

UV4LnK/K/KLhLrpst8+3sAkG+3vhqS4P+rkys9gwuCyDO8MAjBmbo/Yq9qM2Uvg9Sd6pLv5eHIzvZTLa

1wzXTZPVkuSgwQrtHWz5GTjDIw5eRy79B497li7aiv
GuF08hVrlsM/FprRRPu8GAJN6bFtGb28Cm5AEJF157x/mV7pv/yX/wshv1/6O2hfYNEUOtVoDII9muVn

eA2bzvAgMlnYSwGvXK7wwb21YC7FwKGhEV2OHCyqV80uWFnwHq26RElfSCMpBgUiMJb9Bi5fWoExb7Fq

Z9TzOEy7B7aTXhgtL6LrPXjsJJ7vEhD0BXGeAl0+Pj
2uIIGzDQ02MANxNOBJW2ZdohEtbcOlZVxtYULgGARpOr4IFE2ze8WwVRikIYSjKmiLKUwfSCGhI4IpFR

MvJt7umZUhSC8KGoGPOdKcGRDeFZF7OIRdGDFrUVTfDn0KqFkmLLBgYpCNKzMcLRQ6flZeeZ9jexWeOp

uYDTUwPB6bex30VP7SEF2lqMwkUiF8LaQyO96yhECw
D1zpJkKtT7ZjiMqaETXyFY8bY4QvNHycOBKyDI1tqdDvcT5HfLn4LGRaPw2KrLZ9AFZhH81nFZReQTVI

USPoo6FzWV7Cu6jyugOwHHIqGJ2QPOCuJ6JVY6XpZ1rebPipYMEqGU4KCFmmjBAnWIp9RU9ToYIpXVXe

U73rxH7uMT37FIx+YqH1dySrxW1JkDejk6BEUH3JRc
t9u1bAPBZR3wqAjCIGzMDLLBrVBMgUhMJUPDksRIrZzYm07HBpKjk7pPEBb7SU6wj7l5+Lc9HdY/RN/+

7i56hku5+yijG9yeDjJ+7DSBrNtDtaTObdnBz3PPEVdVJwswVmRhQeQilxFiwTKvWSOxVr6rhndvgR9T

g2xEv2VU/jNtPNQjOq47cFrpZBMqCQN/lnEQwCAgJi
EbG2HgR6nnbCu7P+D1a7AsGJPy7einOVShdLbbNIziTievlOIxPk6TO8QzZ/p3OiUbMf4HCrCgDWROkF

kwMwMbCwMLGxcHCwsR+e+i55N0OC2IL+VxsXbxM2r0hnXj5SQD+Dz/W7ahxAyzHXTamIXLwHsCgYqgNJ

4tu0n8WeLHYHPWfeVxNPjcamrjjtN53URlD4Ouhwux
O2a0iBkJhip2z1ouq9hAeDFZt/TdIXiGv3YPer8/fUH7l5+OMUAlZoDaT95M3UTt1b8L80rp8CkwiCfg

ReSdlbZaxf2kkwC0+0q8p12pC/Ts/AIy1Fo3/+2OZPfRH7G/qi1xQ25KYVvI8RhzuTZjuXb41KBYBkKu

qzLgeu1n06dEEQJfIwdt3nQ07BpRQpx3L69sxSOOiw
ts6F+tljb3g27wQC/P+R9g0A8F/mADgRH4N2PM+INcHNLZ5w/PhE+C/+xb/4F//iX/yL//+Un3SZSgVy

D+iYCBKqhlWJp3BaoG7COMCsiCJ8t7tzrwutdc5Sj2Nzh7hzKyVyj/2BTaYRdpQoj22vvl7NoTE1Y0P4

MSNSwQ60UhBrAm7LmaPALBsd/uU5qYm3PuW0wsvoP7
Lf7wF+A6OVWmTQkc8I9gHkxgZrs3noujWg+nqqp2TzXP/zi92+TUpyBEDery72GlnFxVu9v4kYnA2co+

sYZ9cffEt5YEyADsUNLNnhd2n6wwhma/d4eZe97fDYZlRW3yTqSb/HXQsw06cfrrISmJcNHMMFJwn5k9

6sRDgKlM61pLyFB7RZYmbhP+gtOStXSdF2VyszDrgc
Co2H22ACy7YTAu69MLwDNDjVGkmmNisujNIBS4N3Ewqk5zvBMDroqd+K5bukKTpqqHpw3fVFhS+q9a6N

wOTLpQINWPWWoeuZHGVhc01q+kVLCsBTbANbVG5kL430w/qzaPoV/mT+pvnj+sM5ZbhZ+qByMx6nsqT0

EkluhuBkxSdvcwL5oH0ok/eAT4n/UFbh/hkwQ6WXC0
8DDMR0eR9/BbjfO8G+skjIgfdNajgHGqc6u96iBx5JE+cpkWgZS/T9j6vPSacy6/PlbH1RAnRiDD85cS

wC58rVHDJeBxa4GiFEDH588TYVil0xAE604sSHkka5U8VrPIRMdXtZLeWuZ8vkXmViQn7Je5kyF9bHQG

nFUoXwlSVL0/K+/W3M9mqPJA9Rtj4DwnwuJu8ts+m6
epUZoSNHrPSnTd+snl8fIZmEsuGNLrHmoTjMoT4ruXadae8Djp2ay2g1VQF5aFoa4f91r23eBbDMHU92

gHcNR8LPo9TBDfpwyhlLlWzU1Srov5UhCnRo/Dp44HayTx+RRT/Xc2qYgbgO1gtBa9hlfL4Y8w+uKqYF

QOSIWhUBctluL6eJ4nA1M+j86V1lH1QP/DImNqfp8O
i6AQZWnlf+gY6Z7JLMEd5ntWobnoKKVKz8SWfYSvJbB54kkUfV7vYyryqm9mn+3h7hPQbKUWeQUTfoWC

96i5Jbc/dUlTojtkcY90JQIwhxEbSf5jrctgnta4PKeMJfXpXEJt2eFTSGt0iTZzQAvtiRhBW1ZV9b5e

OwSfRMZSzmkDmftE3T2L/8PlT2MzUwYUI1apag0vcA
sV8BxSV87qKpnkr5k5RvIxIR9LVhlqqzY4jCkYwWb9qheKoGYk9TwKg4wMkS6dpMKjCqlrE8fE2zklcX

qMQVhgt25M3z4oAr4kvrcVfh8SQ0sX3khEtEfjfoSpZZ7WAPBnWuP9rndULa8mkr2cV7eTcUyfFyiygf

l78yro4y6mzhadtFAS57R5opC9nnbUY8w7mdtV2i6E
ock18YaD3RSADkPfOLwBkqDAXfkhk8XzAr62N04BJ0XMa78wwNDoGk4iv7w20dQivPfWSrjafp9wpPPS

L7lwL67jJM6sCJ+IhmZ3KcPsMll/+HECHWDB+/Ggk2cz2zC+WXhAoyX5bC3wT0VGPu+/qXzeYsCMXSil

TUh09/YOp/5y7pzMYQKXRpcbvh7c9u199XTaNGcPrB
TcRQ1hx5nTjIefTfixD+Pe87ypzqsK8ity6Uu9L3j981ni39Ups0hjjTgXFSJZNaPvkGM0VzGzsMCHCp

XxpU4Q0h9f6SpyvRl70nWQ7t9Gjg3wBbzqHRfgIF+Zc1IwRdTUdAHReyE4Ga+yPpZmilRP3F3fcinf4e

fm9nvy8obbYHim5ntw8ONDYWhxcfSVXRbEaE1nMg0+
nrWxXpBDdZsg8c7PKBN11WYZG8zmOamwZI/Du0TEn1AmrkRGKSdy9VnY/iWZW4MQ2Bbcpa7TZHl0QVdR

/DWV9wA0S0KMTeYNn/3Pk1T8IJFANAwYun4WBYeSbSYyUZNdv27+cadCrfHfX8BWkiCrTS+qmcxZPwXl

Y+ONX2xSu2gbQRAuNDHuGrMVoqFXjgOUF5oEUPvbOy
9MJsf3kU9nHlNTXABy5nCOiimPiU2fisn0bPN/ahdBIx916gEbeS2lEsYEbrcaRZoVcQJfo/oOWRZdVt

GSAt0mkFL3dSHXWUGZvbl3f4efGPeAQ+Uc2sd40vPHQCTZ8FoT8NrU4A0cIt/uzotGve6EmYYe+BLMj3

5LxaifK8mddBlzklLGdzk9Gp1nvQ0k3cIak0dcSVFG
MjBEwk5nx8lIWQpdp8HXxn1Ce2o22cClY5mGR2oUV/l5HiOYobEHLonM5nyUm4p72dtS44ERiveRY7yB

HW9LDPt52hBNhQWCup6DTCzpxJV3TqTy9393PpdyOLXN0/BD8nd7RJmBRMy/57QL3ivjGEl0tg97Bouv

qOxfY+85+rJ7NCt2l/ESYjXe+f9Akho8fSr84jY8PF
egFpzLRZ8wRwhUotWv/rtJyreIYpyUbcMhYq/YoL572LgLhfvCs02Mk4trCE/DU/7LO9qmMBaIa+JDAe

IBdX4yOW63gBSCl+64HKmb3KU3G+3fyGX5jYeLniFt6moVkgAu+FSYaxZC+sHAaPu1Jnbp+NvVjHKmWV

WQ/41CdMplnX9naArIGp0YXoxyzWm2h4wzkMbuSDmU
+Pq0Ih5266FRPH71HB5ovi1b4DYKIxVzprTUNZrl50uh1niXlc5l41OsDIjp8Q4SavR96PrUd9Q34RHN

TBHNUrcoi2HvBP9zWR0K5n3FlUVIJzXVegFVtPeaVHauHeNG2FavBe+37693Sr88rV4n5khHXn2EBjMr

SX/UOx1M3r6DTHy5kSxtUhiIye+Dnta/Ngs2BD9dzR
C2bXQNOJs5qPuFkx7KE71+wRd/AhsSQeeZARnnnP1P+ndmHYrbBFgt+FhO57OKdIpFQ9iTubXVty92Wy

0/j9sMY7QRTAwyPJa70KN7F2sFmwGNVsDhC1RESQThTwP4jomlea8u6TsBeprTvPAbEXVwUdirZ+6lAr

B+wIkKfp5n/Mpc75pZ+K/mPUvHyPpdQAfJpNUc7GuL
qyg08uW/f7PACedETwENQk2lakdNUBG9ri8ODnUAhC2lMe1WqSmVrlZmnprN+EzHyyOCImH+26MdazUR

heJEBx17w/nuUwIPVF+r9zI0y2w7QhZ81pFcnbIvE2neD1T8kTVcqdnB3KW+VCT+E0Oc9dl45N/5kmwF

CKTtBZ6habRI8DFzJnKlvhKOspJMowWRfSoY+MGH6i
afF+d2bLShX9LrvOwEsB52uAg7AmtkOyBtei4arbZgKLMshaugtyXbOYzjMxFZCQJCotShJE60PTgBZ1

VLkkr9o0GzgqVhxxwv2BYUykokxpklouTvqGhdiCtGJrD69aCyoQZsaSPgfSC2Nz4qUn1g9sst9lgCqK

IuiENHqQVY0Ywmp5iUb94pdf1a8tvKCR10TKgXYPGI
Nd0eKxHrBNzd76gLhRlEr2L75x+KHT1dF0Hn6olC7sRkDaSrmbTWZtAlIWC/Cr/Pyk0zwRlTdKLjxPDJ

bHIqFOSGL4keo1rzPF8jO+m0845Y+B6E+DzB/RPg/nqaipunQ3yG869cJPPLMZ97IY2k1Lneo4LKc+Xq

XlxNvBWVKTM5BJRt8tlSTcsM+12OXg/FuZFhVmlaBB
AOKK21RYikqVmTOUudzGy0eAQfBuS138WMEg9wK2gowAGslCAZ/v3nnnI9CDRD6lEznYap+BbBMVR/qj

LRh3SgV54H6CP0jOh/Fw366rXjgfrSQbIVuZ87Amz76kp0KOEuNyyKeGsYr0pz9xv/ck0C5iSIxYDBEO

G70YLSujcfBkkyAJVwS/OTxWfDFbQMuBf9odzXkoZK
8eodbCT7XZQwN3qmw1LYHhc0MmlYmsQV5LL3wcyzOsLtdyeJLbT7HLQja/60k2WbyODEoDHVc/8p7d4a

TvMmo5JXSTBHb97ZcbxBauK3NyH/qcfohp4vff0E42QMzg3ff3ldZspijbyv3RTnZuMzScU6/mFhIyX0

KwD9HkV9xfjDApGRQyB8oBBlLibhKJouS+76RnCRSe
6VC+dOC6R/AydeqLtbJ76AWZC6KjRJWPX3NQz8aIhem94xIubrWKJ45VJnYinD7xbUwm5vMtc21Agwog

aVZyMZ1c28G+pCGXfKAdNvsJSn75ANsPG/vz2mVQVcDp6xZAxyqP+8ladKCDLerJaT+As4uJYfVH5M+9

15Rhc5oTOwT1w7R8cszoZd31rkIXA177/rR7PgzEc1
h3R7THlwYXVBL7/s1dp+j4cRvb+pHpZvt5+HNSBaVeo6xQVTzAGlSQ4fNQ0jz4VuCmO6C3I9gZQerqZY

tOegvLPiFXAZqFpLuEjL/ouSHeIuY02RONGQUsq3CD6VFEbCs7i7rDzQxwMDAxujd9N0i5pK++bkd25B

QWR11vzmjDenuCUg8VRdXC0ezxoXdUv/TXNuwFskz5
LpNhhnoNQI7bwzmR0hKZFNGuJPf870QDO6MczbfbK0GHe02mgtzp6Cgxt4BifxSwt8anLd4ktnoaqzEY

1uWcJ6q2iHEENzmdcDuUnH72GH/kXpOzkt9+6wwtgaJknUnH99faIedBeXrmVMypTLp5lcNFW9BpQQ0Z

JcKjnWAd3cpBnvRDKFDkipg1Lis7XqiJ9C39wlSPql
0PT75eoOxLXD3VaO9LTfnCzkH3En3PWjtcA6iN6YhIBpR0S0MokTlpLD0dqia8fq8X04DaYgVA91fTbK

tFxHJ4hBBZmMiUQAwC3PsifVBU0t9r7kS7zK20hanpRuvAfldL/mABbkAEttRpfX2/kazPHbQr3dcOpR

e2sUNkdZXdOSB59yJGvhtJKqLw1or/mTwVSHkbrxFq
3ggNNMaJiAm8oRdX714Rc9uwwU0nmeQFl1EZossYzDiwpCsNcYAPns0zESmmKXqzkR6KfnNadCkWxNRT

bRZ65sBEhbIvHO4U3cGWkmTkk8rIielEWfFHf59hl8lk7Rbi34xhgzu44iZMMFRT3yW5vfju8C2Rn/Be

V1lwYa2VrUsCZWy3q682PKF1ZfXC6ldSUQdxChDjHo
J9BdmlLeeMlZjA5XVexRIDdle0XQiE/zYm1MQ5kIh8flmZvhmxJ2oRI3myN4ekBK2UNblN13tmQ+A3nY

d5GQ+iHMw3VLlkwnlzzzNuN3PBm2AMKA9Pm28EIs7msdYxcw8IZ4RoOxYMfbOIGfqk3i9l8VcQnDNeBL

dBuA1IV0yGjORQ+dU2bA71jKJ2v+LTtA5BVieRDssi
cxDhBjqrkJO6L68pJtpd9gXUZGuaDKQRyQkToNtrfvRSev8wiL95+brbhTXOzlkzRvaxfoZfcsW3mViY

BO1Y9AMGDJK4S3lSDlAsiaJ2bEeVOOu0smPcvPFb4nVVQBY8nnRooImNwNVlj7a2hmbh0Uwl/miKLri2

3RnowDLfuom8ZRiptu/MV8OmQUOegDWwW/l4pBY9sj
6QaKqhT4/ex0IH5O2xERKdQ6cRrW6GmGImqSf6KXF4Yr8Qufm23B3HHMGFluN1b+V9V0n21JRrli5MlN

JRAW5TOb+GzD4rWMSjQ5X9CA2RuDkjhvwF081pdkCod8mYAqwv9X3VI50YPXTxCs0+wPXgskh2TB5zDP

Mx4FQAv13izh3rcNpUrF2sv40H+SuMeoGI0I4a7iZm
Cwlw12hy23J64fk+sS0QdTpe6cJ+Qpn/vbZeA/DdhZ0XWUxHcnB2+qjfMqA1c+vAVFBDquA80Mb1qb85

yGfKJnf45Apj3sJtzFiGSLIIGHd950gRD6aHAMeLzSjmWokcPXkrmmaQ5ATTYeyA1DiTLCty9F9UyP7R

xvQ9JfbWnXzX/ygOFu3/MY1Su/sqU7BU3cKTbX9ykc
RmiRXVXWQzb11t6u07kDPqbpeTMnEWoHwAG7Cf6i4u4nxirl5Kn6lMr+6lq+DEnHMqgj1MMQYRMSiMgw

T48xzcxFgpkjYI6v7xvoaWLLOPRlkXPk+KxOyS1Fo58eUYKkuYfC9gXfzUKMe1RWEnpHPTnZ8JSNzcM2

sCK7klEbZjlHp2k6qx+ryHnT7YUyZYV6PfAbMUE6Vf
ao1vYej2iHnI3wpbAvEa/vh2Y6Bvf91cZRwKkHLQzrqBl81cY3h5/CzOiFv+BaPYg4nU0gegiP7O8SuG

PtC6cur8vhlouBjv7aEETpLEs7K3O6K3OzyFUw5UGW8R3POJkEb1hKmbk8+FhKvaB+pn8C4UfqAl8fv/

U4tmjjlUjfy/vLl6B9cvu4HA7XR2MC/zUpFRn7ZP5B
7asf6oaX8ayhqRb4r/gfRW2pnf5hROiC/ur/He2mWa9YnKRi5D3UyXEohJnl9PJQrg9Q1KNSKorBEuxv

F8YH+0UG4MHqB1aKvsj5QkOu8ZqAWz1aqwxXYUyHciF6KtAxyfq/aVXzj4FRnMyijORiACYdI8tpf6KT

Ewhzi1LwijA47tBqXLbU5D3b55aXEBPvlGW/82hJgS
fx/GdxSIxM6GvjwOPvrh8v8C4robWwE+TacG5yODb/3JJqaoafEYeu0gKvvM1CnCzsWk83GVAmui8FoN

RAWwp9cZqMumtS517x3gmEghntBQcC2D4Qe/++9+Gpy3SKKEdqv8tyVpO+bBOwb1wofvgXILhyDAzhnd

MQrjgo83urDaMlIhDDb+t780FMyzwRJ7T/BwnoGS/E
d4GnjsjeFZH8mgj4nroItcwSY4kf8acm22hEOAm93+I1ny2sXLvPpIGeXXE+xcLCGN2Cx0d355cWmZuY

0ea9VxT707oPWoRByB8TyaHWk5gbH4wzSGLoBte7LXK7OvtlizVvlk2FFMxwGDSF+B+LvjPyWu/p+wrz

bkgq1da05Izzwb2s90QAymCCF5/NJmt/pe0zoBlPJB
3lnCjmkgtnEjWJ7fiSqei8Vy1BVheUgkGghXd7NFX3e0V8ZbxcK/eK9zZ2YDIoFU4cwIXj6N+iDHIxBP

HtWkuBXT0qeJSmBpPxQh7r7HIoK/fsKot6w5Jxrhv+cXRm+eiWUH7OGYf+rNe5uXIzmVGhJqPJrSGwWp

ekcspKcyDIvUcBH5gb5qLNOHW7FiS0ZaeHCKmP/6UI
TRLESrQ5RSdCqjkqbDMz13Q3FcAt2j/ZtEh0OSQSLxQXsD2udw4xurGYEOklhyhBLxVVerAz5spegkZm

mBTHkfT0/2fRj+nIZwzyuur5hs9oBqwxrSMU9G5+r21v35UnSCg7Oij3LUu9ZL+gdM3uewQ9aZh6AErg

Fs9oAttpnaJAawoUGoXTtVBZ3/N0PhEmgmJIRTEZt+
r3dimlNe5U4ebnKZ2KPgIaV/sb+vxusDvLtfvWzsHXv2fj42sOxUZO6O0zhHEInqk1yQJQG3Fl7f4ze2

CDGTHuKxcKHTIGOZIlVghTmWRrd6TyIz50J4vXIS0sULx94p/r+HyXS7IuVaP/LiXjxoDTZ6Chks7zRP

WLMIqovPQ3AAQevcW1eobflHLEjZtzgWKNGJD1n7ip
iQbVhutgC6lzKTdQKRIaM76ZkpvZgWBSa+GcBN89S/p96F8WlcsH/tMyaKE57/Szf0oifOawY9fdmtIy

7Ix/60+mhGKcwCaZn2DcTOyEwlHnMZILFPryoZiDa0VkVM69u23JQkTS/uKOH29cmNQSHddDn83zip6/

JA486lC1MT3a9N5//FghHoeYzIQX2th32vx1O40W1J
+VqDmMvl65QQtX9+S5dP0yM2e9FLu7oz0KYvzaso533dJ8MVXa5jE+VssVIix+kvqLPCi37jwfav+WF7

stspV20JC2RioqQL9UmVPnfWfIc3iRY8A4ygfZnJN4U8SMNzikj5TXaFRA1yOzDR08lhl+3szc8BiRMt

4GJx7w+hujJiSqVPWJK/SHSxOCayU1Kl214oFfVGT5
krm/W+4Cu+XV45Km5DBpySOS9J0RKV8pPI8am6tGtPZ/MA0Wo562787iis6qbtpcrYlNXWHob+TJuv56

g0G6eiXcxoU7036PPJrcy2ZKbv6v23K4UhtCTPVQipjt5cX7C4yM88rfqk08aX5P78b6Na8kPGsz4D/f

UhzKj/A81Uu/LXfKst4vhI04DnqH3pJSQUYSKHSpkM
aALg+OQ5oeFpSzpCOXpYZzOB/jm71MGMOiLt9oLW+xvFKwpwNmYkz/2EYgJ1QyekExvleFRBf3odwSne

X/IzxWNjgBIGnSkIqppCNC8cM+gqU/+L/wmMU+itFIPsiyFrKAt5PUvWOd68+YJw942ZieKd+7f4+QL/

5vA+BMNcAKHlHnw+wN94mlg7ozY0dvv/kwiSpO1YNe
uz2NdFUFV4JGi1eBIsLQpObEAsySddMdsRaQzPFnZ61bYqF4Dd4h7UEKlcoewu6n8JVfXd14xRTZQv8d

i/Agm0DuRs9S2e5TX9Eao1R4TmaS/9Lx3sHFj0ALu04vQY8q66Omboz/64AvyXBpe72jcjof1CP8XOxg

20gdM7eQHldloe3vdEfCxTPruLcRQhmVwEw8PxZNZz
oCbHb2pc5VlwzLXynLtRvS3+UkwFLQInu/zIee2YM/SC0Y3BIpkwMYnh0DEuIArOfCYXIqqTyxa1Pg6u

UZAaLzF6a/9+kIKMrMLTbkaQYXQ0E8xrHinPeAq2Y7prVyyHmPCksuRxzj31SlBYetafT5a/SFPxW3hD

dgLj4vXgof3T16a0QtY5fpgvnrHPBlRdi5KNO2DZsB
LRKfx4r9vKWZDlL8hjfHu5lnd/y9Gaj1dOV9uS+3e6GpO7nLKHwTiTHAU6WgLlKl1ly7QSZLBkFosMtT

/KxMAaGdL7TKjYKcnljyHzlqlzZUDiWRbD3Hscvw3Rf74XT80s3Jb+B9g6/BJz1DN/FSisCnVUqEH22I

YLcstqwuSakxDeo2b94Hx3hiHpSz0twyKzTfVkUlXX
p5cQII9j1sUj5yIKCJkUqwioN2JHbKox0+hIZB3/qC6dg9rAnlOyPpwjLKkIyT2X/5A7zdCpjofKiK9Z

SU1qmqPlgWLMJQChDdpzj5GsTnz9idB6vFOUHi+l5zspy0Qk196Q0JrLGpKwEzZrTo8keybxXnIaX7GN

sfSr/yrgzCVgLh7IZ4zP3aetVgqZn3YS/eQmgO1S+g
bp8HhDLQ9xjreoE+fAZ8gu9QzorS4J5gkDfJrP3/BLv/8a6D8odLHh7nnVgcQknN0VqZxgn9rHAkKMcB

VOmP8YqBOHiCi8C2NXNvxDezzhSplpxFiR9lzeaxjF10/eqKwIPCEsFWYu+379izC+h5xDsIgDc1U8VJ

EFuz0JI1b6FDbFj/niOhXOGFIV2+z57m4Pn76d+znq
AHuK6BvN+hV6NqmI9l4740qch9vdL2nTPgiiELHXJTLxwuTinAhx1OlrPnzyXs1Vxjkq56mdlyjmwfaw

AZi6JmQ0ZpcTtasLiauqVl47y1PZ7XIuj0RWcE8pbN1gwVifNg/GH2VIIvLU+uEaNQAj88VfSHKR6yoF

4JqkGn2AtXHwz9Bg+UvacrSBS1mo7aAmK3pcNapFUp
Fvosk1ucBrDwiaL3bgyliYUUMoJ322MJlDxgjBsdKQO1oTgg6P1WLPPQ+F+923cBkyxf+kk4N95JxJib

vQtVJvdtkzCa4rKn1WTKj9hgAQSWnspKqTxk1zo/l7U3wDRlK9FGtKlvZHoLu39zG+4y0DFFavwwx/17

f4GoAfCeQa3sm1SkU6QORU9A1ez99ICvqMyKpTP6gV
5F6tm+IHmqGz0gnFK7U15qGMVzKFLDzfESzKUkG2v9Tzn4Wrv44Xlnu6ZlKakb8znM+0kWxeyyoETEqU

Fqs+wUm3JEQZvdBexm7NBHh1zTtjmOQIZMbddJK+j01sqFigVZ25PZIVp9M6L4tWC6ZUAU3OcJ+pjE7I

+IoZSbZRoO9B+WOWtySeRT8f1utIpYXoZP2mDU1TQQ
akHCNBbwSVEz22ZfUNrf+RDsAVaIxkOvQx3/k2Yu2Y+lb0gBzjmMIg2rHCFW3fyF+cortgKo3Y37rXnj

UBl7LtLgjP9viBiX50sjZ9dlwplaOt8KOg2IJPmM27mFaTXq5KoQvbKdykxB1B6Zt2LTJBF0ohgHwzvW

2haY4YAXLH/kC1HIsL33D0UGhugungQpIpBWparFXc
7eXcvC2O0UR+N3Pp+vddQrpM4UHnYZWiThKz0yumKfXPxWWUM+I4yqUtxp1rzcJ9pDZz6dCQuNz0Pgpa

A1NNXStq5wJYdWzeaYEtREpEie4uQEdPagbnugI7PEvHf3igl/HVql7yrQsmpqnwWtH3qyF85oiGm5fv

thP7uELM7eUaaAMKazDvxb808AVcj52eEvC3jxz06u
ykhz7/NZIsjcJtvkhA056PEm0aHcKnWhkOZm1E0Xi9xo3cYvNeSQSXAAl3qLx9q9L3oqN1aDuSse+a9b

FU0NyZA+/TijoyFkulN+We7JsKHyxEuG+6CbOm2FQPfrMuIHUS7SQ5Jed9BuFLLs6OK5q5pOzGH77zDi

KpL3oe3kqbGu2QxMAPZVSfQ02QWGT48JE/rdamr2Ya
AvZSmx9VP+z6rnRgni2s+S+PTDumvUGbUA45ELZ/McsN4bepJMzfa3yIQlUnbnuBTgIxzjhcNO1RxwiT

awXbY/DwvDCv4nEL9OhjtHXO9vmnk699P5wxZ4OglKW0/+rq2qi/KSrptibEd8TUW9rCeIy4lTqfW+c9

06PbMJaVNQ7SsZO0GJXi8q1houvpn6xRZQ2Ssqija7
+aG/vgtQo1zzT+vxxqF4PlHEli2Qf1vaCKqO6/soMWO0k7EncTeGKG+Jefz38+w6bWZet9UCmJ8STAUj

syS0oN57NNpunMLLPjwM1QZIwX6Yw8I4aX7iEuTh3UhGuhTCQdbDvcrS9A5TKDMgsmYNlhj0GeDCeQzA

/uJJHGbw9jvcmeKXsZRCQc9mHS4POnVJ0m8lG7zAc7
dXNeJeIbq1N/1qA9aSwnc7Ps7hhvtry/POyixMPIdhcJwUUrc4iyq79PeDybEFrRy0hT7YwXnPK2t1de

Mz69pJoHhCcq+OQkPLaEfrWbf2/GH8i6XRk2JVhBWI1VE7JflQuWXsT+IVjrteNBVocMpSx3T4gDS9/W

Y4ZixUvs5vA9B5NM6JUeLcMOqCn9N4pfhK/+9o3Bfk
fWoBw3WVIa9r8xJmgx+e3tTclIgivlwV9z/nmLFrjTavlnvmmloA8g9s8q7nwhtLiAVot3M1VycysyT8

rw5JjlWXO7HK776BGg3Qjbq1G3qRpPYQDC+P2Uol244ZHzzx13W5pUbwz2DEZxOUF1huKCUsPx0xgQz/

gejeuo+rVvus90bJ/9qbNZowUW+7wNUxh/hnQ/HnZk
A36xBOL7p13zOpEaeW49GOxdJyjbBmUGTlpCsKXyY7EnZaTq96G/PalOEI5uQ8lBEtMpaf5KFdKwkE75

zAoGVPrlOgUQc6nmN18rqPduIL4Hy1a6lZjHb2iDU2bj/MN3YP1qE3gvyJxPWQDpu0FhPQA9XchAtHWt

F5m4J2sraVJMRrD0tvHNabHOVA1BtzkwZU7A6QuLA0
znfn9s/fMgKNbnnAhEAd+8pJWk72lYA019drep3DIAxC+7k2Qa38D33nEFv5xkAkbRL3TeJ2GYdJC5e+

840zZWrqY7G3wo40DAvCdqk8O9hcjmgvakSfpr9nBTaLMmCOaRkkz9saNBlrHvN+tpblwvwU/rzYe7dI

hNp9VzyM8Aa8U13vONCqaU/B8IN7xW3RXfarZPj2aM
ExycozBolziJPQCo6vkd8R6LDAxw0ZxsFV2nWf+Am5aaJ5aLaQ61g7NPl7gQLgbqT1QQY5z58HE8LAch

sRJA+J1jQLxTRxx5/JomL7o7048meF30JjJCUkfugQj5bSqJWofd7YvDPnZf0Q+4Rz1E5B+049cvPpc8

ehbf3k9rbEK7MJCpH55dD11zXb5WWW3MdU3EXCmkkd
1eeqyfg9dBNNLdT0xAdxqhci/U1W2jnMQDRhtivKOIb8DVq1xOskUWqwBfAsvU75l7sVitJUB3aOq7Et

xW5SJ7lNe7t9glUntR4t3Q8BFAgcWB7uo46f/X+Pt8VWi9dgs4M6lG1LJjTcqS2A3wu+VFsclBRnU5/h

nwN3AIvJsmZkbqjdRmla38gzpQhscyVJVFliMP28H6
BiuKHCcMUBbHroJYHYhVw3+D/frJNgCqMmpuPkFcBKV8EwLxQDy7+eqvQvlVYR5CeYv9Z2m3H9tZhfzz

LJA7WqJ1gQ88iq0yi8xkrdCwi3CAxdvOWsMdu0cZvxUVgO0nzK5buoPlTWxy0H0IcSe4xttfvmrL+BkU

XSn+o83HgijLvDiA965YT/AeSpYTPohDQ7wHhMboH5
zAL/e44YX4EwZA9Vb4vRAEHHKBVA9HNl0W2Qjtx7LKNy+gJ1+AZTbxtKgGfQvAz3mY7gd8yCUdqFYDW0

CIC5QHOLLamfWvVb/DSkB4kjmvjYbTZyojAUnFxOVrNWMdS+Mw+mW2nqUUFviy7PV9Jm+mqX2OYW5d07

mMU+KQIHzR40lLFPEYJveE7QyXfcaURXLZq7r7FFCK
kFU+Eb1ZflHN1p/6+ZmD7xtLtM7+byyXn2+RTPtyNV9cfpiVvfU0Js5vBfnLmXBk0knQLRmWlPWf9t3y

2gm/djG6voT3sjkhJQy/lNpGj5aqKx+2e+3QJCE/aBFH5uQKYDf5yhYoWYwI+T86z0oq56i4XKIySN+3

LcodbiG8xeafdP2IMcGcKZVqKFCU4LUGRY1g3jUZjr
16+ZOjAixBcT3FNIate1A5AAIGYX+/ekopHgcrEHcyg+4ZIwgbUk/P5ZMJOc8/TgxzF7CDr9/Y8qhNuI

go5aiH/iUSoLeCyQUcmaHeyQBkySjcoa5F6uaQaF6fkWTcjB+2PrefIoGTKJufeRQTjgoaPhF2/YEnms

hP7pyX+ab9NDtz6AuV3lnrmNLoU7JXgIEUE3e9ek6q
mv6OpasnSjM+8ShakziU/DtTwR8B74vR2tuyFo12BlFqb4PjZBlGgt/ox4bnEEHfQIzQeDkKrhUeINWW

bbBVnD9XsFwPPT7rYtdrB+DYzjCLEKfPdA7rWCkZSiSkUpP+zppmcuvwYl2qqoZ740lAEXq3VcRT1rdW

4VoUO7ZMwFp4n27KPADYtqizDE3/V7WsdB6NuyTYdj
o4fClq69qdsxxGNFea9Ypw8ChvY//CCppPeOpOmp/ORV5khHKhypvzhprohB7LrYGPcIwWpkzny/GvP2

vrAui43UdzNYRd8Wb2M+pWBMY82NgbS/1/xyE+cksHGoXN3iOCnajGUP49TlAAYbdqevbbN+qLqc2VCa

GFKVIDeGibD0tyWr17jnmfldmC2dpBDcS20gWn8Q+G
kOBHNUQkznKQRQH7E0dQmp0wP9mzhYzqk2KfU/t3jYGO5qcWLuvOj68TvjsRdgbW5WHxQ/Jm04iRFcym

yieyQB9FIAUu2fi9H+XLJRVKwhZK7pWlpDoJlMgBlS/W2BM8dDiMQkidSR4yq6tBES25/sCgvimuRLzO

dbBOkyHl+ZR4fCpt2SPd+VYzBBn5r0+FVNfPkZSPRE
AQo5NQIxiW7V03jGgrG1uKJl2KwYDxTYLwls1VXRJ4cWVqLv07mHu6NHfMxrFczkDHfsYdVSbzxuWtUA

dH/72YIsh4EQGEHpDmYzy9L4dTLz86knebRkMz/15mVANCbWD92aWGn8iSgYhrNaqgxZYRyDaFIUHVoV

RGcIC5AMX6a4Oym7dmARB68/d1O7s8Ml8PPBMY5CJt
FJ4KaMGgq58WRgb6xpdwqSMuZkpurEIazuNYLYHbe51qhi4qkYCyy9kWHHc7oHLcWSI4mvAKBJM5+YPo

MtMzeegbPAxa/FW6zs8Pnam9r+YwAQekCdRR2wFa55RHv47WYDJS8PZk8vIfO4i17HKib4tUDkybAbx4

PzWJEUHcLTzAlvtmewNGgtJKtkO7bx4bgDiVS8UnQI
q0c1VyU9br7AuNLH2O7JTuG7unjhqW8X905XGiUM0fLfOmL1qQRTZUNLD8R7zTXV+g5haJy4MQamVLd1

VCCNmqAgmNmsu28Rqni+dF3CkfRNSAkTkTDDXpN/JfDMxIpl7b+BMKvk5MVmwgBjn+k2ECdlTeZkXJO9

+qECJEH7pd78Ds2afFhYA3VFo8Dxq+3p0yYDrGL1ND
3NldtqPgtRkCXCuG3ljT+b0/+FAt+kVH4W62v1rZUuFqlEQxqgXeh2/tx6LRtL77T1cUG9dcr/BOiEkn

dqtkPWCrcy8uVblj7OpH9/umWPN54S5DB7IZpuRP93UoCgu8bZtwYjqwU3p+shSfLHoodbP6U1Dj/lqt

+zZhe+RZC5GMtKuohD35ptmnAW5VOThLcsOAJ7g2g/
gmTiarV9/blEkjTzIWL4O0kAib8+ANm+erCloAKy+7Vqh7C4UKkwFEpzgSs9nJqS6eMoP/9WPcBgGkHe

eVdbfYAj7egPWRF0pMUqWPj9SIjUzG3Nla/DKdXDsjHi5sUBiWOY50SVR/ARJ28TvzkbOdYkrHBvJZuv

zPJNJHV6Zz6XfgLuxE465rrVy38Y2M1rqUqt8osFc6
St6uojGh3lK8ruRhYzVc4Q3dEDPxEEbbsnO7dM+jxTAgBE23qFkIFCfGNvANLaXMGzAkeqBdFg5CfOgR

BS8T3XhtfOKHKaeWEI4BCJ8bkkp5LxBU6OrftptzxrZ7YnFjKVzmXL/O9Ss3eR5tGF27887xYVmozIvd

8Q4khN+gy76o6KSnRMqGrVeO6LkeL7K+EWzGqYuv1w
kKCEG153sz1LoQLhNXjtWolhqRmFX5E+e8xNfUHT/eIaQVUpSKeQdC3oTSM9WxA/iZSUslW0LDDHXkAS

4nevkx8V8q6wuB52rbZdTyJueH0uAhiiwOASyBrQpInMu3sAI+Iqe9ZLnniQmC8pBv10nlP1f1uugsbi

CRWdLHkAJpm06AoR4GiHDfAiY1Rl30S7j8WMNIsL40
AEczyqqvzblk1yff3ZTXB39cC2i+yzZ3Z9q11bcySwuFB6Ok8u35+iiPRk+NNBwmYLFnAt/+unr/SR6z

9RwcI7vyVH6fWxccZ4ljyO8TVRlQdClPYlrLV7ILrbdynz3lnP8fes0sMF/eKzPROwUWnwAuBa5bTQZW

/IgE6R2myA9DywzOthkquWWqlX+v1opxhryMA9HoCI
nHdzqIDQWkCo9xRnBnd42DiAC9wCuvngBGAT5qBwFx5zV/HqDWVJOsiNXeVjNEjPNJiv/e9WZWEyL9Cw

y36H11O0zply+XnY0T3vDqJLFFWoKiGHQOVTw3fveX0zNmzgymY/WWb6CP4vjLGx/fQWM+EKPZa7QOnk

sQ5r5o93VV0vlpPK7B9XWZwbFRLx9G9BjpE79/HHbz
+xRPsbUjdFfIDtFj6Zh4NSv9bl1shKo1fvLgPu1eV+PGyExWsPst5IoY6uu7tD4XW+r2v5vmAX+MJnWp

eXl+2jeKpBcD0ujPGpFXg2+I9EivM29IV5mX/lLIKY5f9tzrSeYpQiez/VCgaCTtdQFxzxMsa0vLqf3f

11XihH2ATtNmJoqAXBr69s1+fLIuNL76UQg4QI6cyV
u7BGAU5PwjIRzSrJmdfpfy3ENJsDis8jLxcNpkcc9/MpAaRaj0Od2UuVJPrX9X7/uLboaJogEEtJQ9kS

z53ah03USVKZ6vVuM+xwht/5oxUiAx1Oqp67O0EyXxZR1yH7hoFOLdYJTD5O9h9Bc4gSGXupVcZHffoN

AjWcWvAOiDTmVa5yF2pKah8wihZvF30/IWPhy0Y/ev
nKYHri6aLMcjmqMOwU4FP9hqDFFROTcJjKB2urXG+1GPtygX4jBcCJ27oEBimkvzIatgbEs7TXcF2S/4

Ro0nS8zMsd1cnsRMFa0l/ONhEuOWK22tHKm7iI7t5i3UFPC384iTTKZFo3c4ujJwn38iLBqBcy1PkxKl

L95nGIFyLRi9+xz04MzPS8FmvCStG7QqXdP8VGGJHs
Jp4colTiH7yS9TaOyLasVXudyhXq26nfsQnbrt8tCG7ELFCNZ5Ha9hFOPGRoW6n3uNAwOPQuz6REoi5b

AAPkQKs0I7LGOtKu0R/TTlv8Euq1T908kINpVvlpEx7fyJQID6+mD7kTqZQui8fJjQPgmr5o4nxlE8dd

HCgRWqc8AAq2tyJINr+ZUs7Lb9tbKVSFTP2pjFm/u+
nAzYEjl9hlFt7D10iFNk9uurlDcA4uBqJXkX1vo3z4UUI3KVwqX7fo+jldHv9Ra1nuMAhrASBdd1WkFO

HDINpbZW2/2jMyR1swgdgtBqc32uL5NogZqpLCrAltAT/SEHMODdrJVrzC0QvQNHN6QcrNF7ULQuEoUo

W5dHnitqJLv07vhhHx928YQxsQPCPWt316L+M16J7y
WdAw8sBwLmPP00E1r3SKnyGOLjSovqT4nqIEbOElMbrjSeZoX9OxHx4PZq+yWLUMi7vL29BnduofsKD5

G2AvjcdwXoXOCmAlnfHikIaQY6QAmbtxAMO0VjUSl0L/1Xlsq+5ACtGqd6+aqaym5O4UgbDkLB+tKaoD

zbWQjqw2xpaCH8DEZcympOKl0QqTrdkmHfB3EdXQU+
XtqKwhNL7NyQQZzP4f53PyyDsFBKde6Af5f2em2suYA0udhh/8DswhEKBr1ohy+cwgPSCuavzzFaPjnI

ofKUYaiC6jMMa+YKpFawl6P/2r1Cln69Gb2d65lcriRNJ11rx9eidk7oI7LDXamHEvjAkndpSn9yx6+8

EE4JEq9gliJS07K3zf+/5e1QpWg3TUjq8qgUYF0XDc
Eu8XmVi78OCmfLBvnLoSMqLUyml7tQelHkiumnx9zo7S9RPqSrNlx3Y+MOmbSDMGE5yC5Z3ELmUH2rRf

wK6tHE+oDk33BGOpPFUGhOygyMgzk8+gW64DhLrcj0IJcDIXj3DTGsUW04+9kH2B5AVsJdEgpGW8G+Oj

tI5xI33wNH/eMKftoC86u1NM3QJCV7Cbi604lxePb9
8BWTAW9zus6Jno34Gdlu60JF5cbTumf+YgOSSgrjJvxONOsDuDbk338o6oQmKMC8mSO4c3dd4suc3fqW

gyMFRolU/1hxtWnFr0DPv8syH8HRv8vlsY1rySF4MkWhkhvnRithczaPZ0MpY0ArklObSCdjbauvRO1H

SVckMRYqoy4NwmAsCweQicKjZjm0e27kcOellIwAe0
hHfqLF+0Kt+VEC9k+0WpbdMgKo0TBllooOKeR/cYyq9d/28PrOTYiK4pNVWYhy4pTvuUmR/VxX8x4Uyf

Ekq50O7lDHYlN2XUEuBxAMl6Xcx5Nk0moaddRsBYFGY96CteqiQjLZBxO8QdFtiMon1P/dp/47k4445l

Ie2g5R9BMt1GE01z/B8rYhLFbmi08pUSfL9QXDX0ez
+sqq9XqWPqFvFT4r2HdIvZXo3aC7E1XABrPtldqJPwk0Lj/L4qURy15ysH9UMmwwRY9YDuJAXwpKDmjH

hLvfmUFNbBe6Luf1Evg0jcyCag2QEr3/2IziFtKdS8kwEHi8NyPjnzoclDwiAZ0Yso5i/QtiLCFP4woY

CrivmbPofY6JfiMOqdp5qKc0I5CMNwsvXqxH7VSyXl
MjfIk6qLpk5c4LGBvoCVQ30AMplayJyDBxBUTAe2b7o6vSwQ8guO27EteQvZk9WZwpm0l+OD3jBa4bDQ

BkhORO2C1NCU3/G8iwg7cH8XwppjJ+aCGD5lRex+blecM/i6+jtcP3Ti5p4LRj5xhgxgnq77YZx797Fe

MSB5mS4E9L/AkoCS/Ro1g9thtxef4mWmVheD8gRfwK
XdSqTx2Y/yVj6gla3D/z+48B3slOjk/0CK6pzu1HnScaPMQ0OGkzNqzbgDsiK/qzLEpN1Eq02ZzQ9w8R

zASicdZ5QNrc71IQvA5LfYhv4gnskHjyGtpKb996ek296cWRZdrshAzp8BD1fBQZA/Gwyfjo9DZojJLL

IrO4Yku6SoBTfvN+VLrs/0NjkFos+jpJAAR9Ls/k5G
LA0DWyh2lEgmzAgARnKQUBwMCQjfR1jJzzhklvwr47wK5KaOfyyz8AV1qIOWCxtFtJJiMCmcbb6LDc19

NBHvTsJJiwm4DD/2Xrqp3chrJ17MHjxLZLTxx7Ja1ZS9wefg6StP3TkiOL9dOzNNjSVUAr/U7fWuyeEd

is0DX1gIf9vD08cGZ0IJd/UHNIwU2Uz4Ngb/8K6etP
PPrV7ZkjeJ+HPQX0n6+abm3RgOHE7KQrX1iZ9yKQNtHJ/uIQHurjvBzv1yWH43INSd5WNiuEViQ1C1RL

aE80yxw+BaBBNPSYfoUfoWQv5stLxXltJsKxXB95SEqYU1gzTNUo4H5eTfzO86Dq8b92WWyqyFaXxtx0

TQYShkOi0Fa3z8Pbq5tmt5WjtpXsEDe1sexAn44FCK
xwM0oN/6MyglQmT7E96S4aDh83hylj/7PHLO5D0RG6pntq3ei6OpEg9KlLi+lkpFzYUMm2aUlrTno/tm

xwpKt1JbJsqzGI0j86BTbaCe6qXGDftUauR+eZIkRF1Eq66RvGRjtkCGJGZwIhVzcNIZUv5lMpmzogFo

BhGe8aUEdnJYiNqgHaUr6pwJCtopQHdedQDird4tY3
7dChAs93eqTlXOrTp0urV/FqtUJmjN1N/Xo/6IZu9gloQ3bN7sNFRmF54TXlIbb1UjQtVFp7uUR7VTG9

0Z5E7UCCWIDgtvX3uewSb21kcu4KDG8mkDihJs5q8XG2TMmZfsWHa63TWpE9ZUJyN/Ct9/XE1azrul9Q

WHndV+JWp4RuvPxQt2fylXugN4QeGciBIfFp9YkhD7
j0Z2t53xrGmNILCSpjn+lo4DdH3i31ce7cfznweqoov9lNWkDu++QoH8XHMB7x4pAAvsXIbMMuPq1oJN

I543LKYABibboVqczpJ37B/XNWAgzTjidvMRTFJ1fPv1yN4yI6lol8OQXn/N2iFnA9M8n0yh8xrLGHSh

C8cfrs3eR2kxdoYPwv1dsXGIOhkby73mAERYiN0sVG
F22AJuZrDC1mflNI+6jWDGHp5zmWKNeU1owL/clTN2BObm4CpemKZ0BRqh+y7+5bNhAODx6jzUnqAVKn

XC8Oj1gzkoVeBb0fZkH5dZGtIpjYcyQr78L4rVz9eB+gvIEmPZu9NZg1GTGStJu8fBiG6L8oeETQqu5g

SmqiOj9SCVCqIKwaoNUtm0hbcVMWs3QiVYzJO0mt3N
U6P5xdA0uEC6057gDph1W89LPwoG2KyjJ7tcQ9lv6/qzFFyd86jUskvpPcHaa+b5tmZ2EuTuVaSKfvm7

bTd5zmiJCHe+GDAp/zPFzzklmkcki+A3AdiPC5VL3VJ+1NkWC0+XlBvrTOkPGPkKHUEWpAjEOK3I7uTo

C7mBPcz6LiGnFdKNfPicd2Sqj5nl0M8aRu3hZ7jRS6
VucpACEP1BJX1zFOdMeDXk7q88EkG4cr26A5Xx0l6mT8A2yiCAIak5GcIbVWp4fk7h/+8QTEHZIVEwu0

flBxf14wpbUHgPAzpD13v6OuO7AgSGN16VrAcobzE1BUgmJ4igKLRbMSqdQMrR+//eUCad3dN8Mkq4p9

302fBfydGBQ4wa3gBNaTEHDR+12hH462QQBqv7MwRq
OradEJQrS294mIO1CC7AoSUkb/RTpxppSvTgAIixrIq5IWGVx8WR5bjCavVxsvPHSqemK3yZXBMeZStk

Hyn5KOnHO+tAr6C4VLyIkAR4wW8oSviBpug36kCvYhSj4NS3P9l3v3nBjgjKbKvBudiOF+ZNMft9/veG

NLcpGXUV9yv6w5865LHKhXMlMXL84Px9hwXf6HszWz
U1Op6YbZMaemdlhLrXp6fxn23ybxRf3VBkL5YWjEmVZaH2ho6LjD3Q4mj4LZ1QHtX55pYk/UVpt6gibD

Cherelle+vDhsYbsr0u8IAtpBhGhQII3n6YoGU/g9G8FpQ7Dl318LROovO/5DTj9n7hrEA5Gk1BKDPppVxmQJ

pz3jLZqtz7Gg1TX35DEB+14f+xO+7LQ8lXhOwgpV06
e+d6qJPUsFvybN4uA6zSGaezpNs5flKX2P7a/yCLC0nQu0zJ/ie+SxL6TxTEMVAdqogErcATMmFozI5t

1G/ayFWiqVJUXBfVSW9r40h7CStsrkjxibtfwsDPC4b7WHXMdRLbAoHNXnzzTd1oB3Jvi1wno/1RFaoE

FluDD7L5wjRhjUSv3983h6HMi1p1qhtzz59e2U6/4t
G8F6/U1t9GPp+3ICB2u6wHT4Kg1AhNEia15A//jfSLCheK1O6XkTxjghwAlkaAx8ltGa0VtAJw7m3l3t

BADQG3AP0gDMj/BR6nacsc9Xb8LDeM8BgjTCihzyX4B2QZDXDpTPwHV8oBBUE6ZPjSYt40WyeV/ChbuR

GJF+n/4n+9UZ8ycx+9aE8d5Y5egwcjsAb7jLM4T5Q8
HRtpkFaXNrLcyCjWsnpEt+KA6IGLzb6HB41vJ5CaXfzvTp3j6vfcOmpQ5oJF1XEIyt4hJbSlVdQUDMj9

l62KeLvJYwPDMVG83zSZth6hp7e7OeoTwnH/wSvuq1PwbtigqK4umLgaOLcONmdsEy1P3o7gt49clGU0

ugiXU5Nct4XWGKucoKZg0cw5QLSdGjrxBwlV9xldFG
5x5HoCP7Sj1EMk3kAq/EiWG7fzV40SvzfoZ2HuRmcJ6GsBhuJn3BUCAFbCRQpTMGnfcndKjdhvUvu6pj

2vTtGkNgYsbeRUPdHshrefxI7Udhz9q8HR7a7aSyfIsH2KS9pv2xSepnkUceonEFmaKi4orH0KaOf+w1

6pTOn6EItFLrMKdYy/aBiOTnh0XOhQZnNdn5lWFO1k
Kibr/DryEw5pPedV44He3Q3lj9tmyOx+Bbm7C7dcVFLriq74UO1BWc/YPbcQ+q4YskVGcmbU021UXzw3

UNvKU/F7uisLvZVn+moez/YdXkn46zmXkqGHdt/+QyDyYjexZ1REm7jJdDVf9RtEl5n9kjkwtB7YGGf2

NBtf8HwpJFceQmRke41H7ZioECK7OWLkGszMDVrcN5
EVr8+4+3U1TWUwBi6HIg5RO8XLGv8RXrJk3on42m2WNMnALXb116C2m5zirJ9iFzHEThGNhonWbcoFQt

PYX/CtC5X3KiXDYJp9nzYL1hYbRym5p6k+Oy6sKek2S612LOjhyhyn6sEUH2YjG523s/rnlfUAPITP1p

baND68f6Q5V6MqvPgrVa7KkNnTsEhsF9btzNI1+ZfO
fa33Orh75osoAnE8gnRiZI7fVOsJ/3Tnoml1exdFF4ZMuMowe3iQOUIhUlhZjhJ+vP2OHlUORPhgOpdE

TDKlDs0kVmoRNgdhwQYVRun7AKB8WU1lyj7ElTPzf/xNvqNT/QJa44Dnqx5GiNVlrzbJ9KqHP1RHjvbu

6dixqALn0YhGfWlhmgYpAChPmlIaO90VtYoqeexxbk
KtD9m6+MxQRkndYVdliKeojFfHxBG9PykF6DTJCIa1B8dY82oqAqZBCOcbToIV9UU7/XwdQq53ssaU7Z

jiF4WudJZf0ZyF3IOLNwqZdImJah3SDeaGbIQUimQJ9yXKwN4GIfR1QA2i7VQ51ofqXxd4YLlXEVRNxv

f+9a9+0+7jzr6Oa/1m+tc/kKr16iii52gvapACWYK1
mOcD0tdynCQEkYGllVAwz1XixHH1FxaqQSA+MuaY2/3a4ktN3V+OiqLyeJzrQGKZGND3EG8+9zVVqlXr

I6OVnV27o39fKm+j04w2T2nXLLmjtQCz7Y4b035CZq0cngpg3yKheT+pSWdyD5+vchjTlNTD5y2ZPyQ9

2s4AqAb48Xzuk2QWiiA4jlDl5pswX3SLSn6WqN+8wz
Ae5DaJCcBB0UicpdXNrMgIxrl58cIa9tBba8TS/2yX6RpzZXGlu2fiIxmkXjZFh35Jt13+a+bJuhNGib

eZwW8EqeTJJpdHuEbDbsbbqsjbownwPCQZ48qIPcU4R3AW+OHuf61AXaZzfw9VwWW454h6JryKMDyBiB

QczvODsg7X1A5d0zZJSx2WV87PNuUZi5J+UHc3nAq8
eWAFI1uKRC6MnSJ8n7KeYhs+CCIJuk1/WyrCFYl9cK16sDNRIrN6+sgIDgcYa/UZOq/f97UPi/IrBTZ+

9s5JIlR1rkNL02BrdQeO9XhQH255yqEFIt5INZiAv7FcXgkKCoEzddSHBlG4Sy55UsVl9b+uX/SIDIAj

f8XcHP9vqC07+L9kOeMEpkHtHLEVXad2PO5PkKGBN1
RS0nfOASr4Xec9TIZh3iQ+4VEBmO50bQ7QfSkJ+2NHPvu0U35+d0jeBKOmyW24RaKG9dQR7bL4uCP/hc

5ug92xuFJqoo1PlcNU9ROWmgYDckUdpJ36Kw9Ny6fI2vSPoXTD3bfgcifjabI8oQ0dZP0Ee90UbhQIP4

4+uKEs4vdegVIeRkZP/mz/IlICGcvnUY3zmGxXrp2z
RC6GncTiQxY0fQtCip2RWDTWiJhewvlyGm4Q4WixrNy6Gb8r0qkoxdaw0idieGoiv3TXjV8Alyd8h35I

Tj1k5rBcVI88+HLQ70fIT3nUuyYAwLbqNpwhN2c4NgTb5Xf7s+BS6LTZvb+5ni+JTbC+PDUvT91Chl2B

pxut/aRFBbeDe6M23k1KSJ69A11yetYs72J6Bda28h
pvG+1N3E6Jg3OpnN15HhfzUKIPP/R/dpxDNO5xxDcQdZa0nMuSpU1GKQbPByHrvO2Nx9ehgaPDvFwj7Q

xRviNnI/EX+H/uHCJ7IYSQyEXuEvCYxp5psgT5qzvjK7gfPwzUf839I4lFeHvvWe2ury9i/etjYTwddt

bajNytcEvGXx7zL9mPip6ydjndSft15fQWq3R9vP7+
BbQ6MCT3YnLlAbOsKvsbK30WFWy9YIvfrVaCZRQburGwlve5w931HdnSAmNjMHRQTCbc4XY6i5PDzAFp

jewo+AkYxCgmxBjEk6eHa/fpSTQ1wYufBNdx/fvFr5YOO1M5Di+Va+kr7poUwYHSbme31n4Sls7+P3l1

IqMgrVS3WBfTgxUGh38nR6jMhQzT5x0b6Mxxrrmrzm
mVkuOHj5lnNCbU8vIlGnZ3WYjm6G4B+x6yVQiNdHFaE1ZJbF6ud+vqQnxJJZW5rJspORh8OZDoyLMrVS

LdF3tkPO8YmYX4WFV6YpJqhXi1hyTouwmV0HxqJcu0vulEgbn7WWBu3ILZcL9J3JZCr+6jaQLlktHtTI

HPEXTb8whfQk9X/dMWZeOsd9BCu/pFtsH7zqXIlwFl
4QEBTAo1BCYwCT+H6KxX+e4SbltuZ/AjIu96ksMhxgAWySfHUBuMiA5AUkM/A7rZ74w2BW0w9Cqia4PA

vb79r/nKKDJLg8qM41M9tlC1zq2qklQ0KIhjwRegf12BuKoTyefZoZY2XDqLNQxcs5oWBVOm7LYCE4Je

ZtkbME8Zy/4PgMIwNhsT+znolmZrxKY7PlYxbBjyhh
bTQTQ/0aMvqv5s32oMk0WlsZpnQ1VNqdEpIEZGjCxj9ap25p5WvuxlvNa0Fs/JX7Y31zY6xbj0VQ4nRt

CmzYtL5zx6r4tn9Q27XSXgE4uu9YqlU52kMHfRhWN1C/ALecP+FREewjvMgjAVbF+H32QnMDdSmdaWaa

7vJ++b0Vd07ocnGW81BON7L4KA5V6oS6fthrlxjryr
amS/0pBhCwvbIpkgxo113hsFOdEmPM4aLoOOrXC1cIGrxQ4JalNKowBw8/U673GPCshU04uYJziMfWDn

CXC+vUt3vopEuOBlnBRdTXUgmj0yg2H8dDvAUc/xoN9dCHtyzqa/9xVNzDOSNQ5mdVoZ1WL/7cFDemvL

etFNoLOM+B5e+WQzvSwhUge2CFNHF9I3Cf7WhTOuda
zoMiG0uczGBiT//5biu2iQMo31D22PICNQ8VZ8boYqrKpCE9dMJxqYgj3rSJmXV3Dz98/AUkA5/fKcz6

NTLgjUb9rHu7U1epYErGGeHDj0PXLd0OsNsjOh5ktze3sUXSxQ+XCYNx7yOmg7GpD5/E4PdWiSJpGm6e

l1QoV+P2N+cZUZbbMbjqEi113XXZ1RSUPydDsUWuVh
lkvfNmA6cC7rRvndLP06Kelzsx8yaq01DN7tKZiOhwpsCln+GoBr+fi7GY5AfAdb3NTklxwqr8VKvxc4

waTtS7NmDRcx5yVahV+23JGwa/RzzTZ4EqZ1pPBqhOzAlhQ2pLeVFH+SlYTkyfpHGcwSAkZY30DlkR9e

8Hqh8mnVfwYGtmKjkFVpdG8DQc3yQwqZ/zAFh4oLGe
XwBnHB21MH9WxcJsY6MG4ElWKmEPLuvFK4LzAxJMEzI9SX+vV9UC+jDQvwkvCyV2eY2BsmxGmZw3O7HG

gjRq1qVd3ngnGYco+ngU32bTkkCY+3rVJpWJ37hZa70bT9ESwuadJCwQ3oiCDpY758ihmGuHxngiN6QX

88EW1zmJMbXEEExjG8HIzckfGeCos42SaxRA5bAcU0
Br5qlUt62ChLndveiu61djPnbAb8PkqSVZcNd1H3j1/lNBUmy2tUn123bN7Rjswa5XSZqNEC8akDx5KD

ByEDF4vEdaMY7qGgfxGafI9qo0Pu3ZxlzIVulUSavC0bcwFBjA7NOq62OGx+/D42TudrdxTdc/W8iUfO

oa/GQ9v5OwjKsCFKRhGJNgCWDLsHHclLDp6/DBjQPs
tI27xPU6jDV6arUJV89ZhmCuh8L6Jkr7tbhYieYQ0Z3eIjtlJ7T4NDdIOVC1J56s/Pkc+uzfOJBp5gTp

xisf+G9E9omH+sjLt4aUKntL5rdBCAreOfkdHlhwUTacjQ9Ylzl3ArTyUW14r7zIy8rOaSnx6sl5VszA

wJmMG+s4TvDkrFeySavdxlh30srVxYI0u7bb0L5Xr/
1U5AQ235FOhyvrHnhL4cTBAuoLGBkA/qUVLXGQxRiwVpf90OxhAM54eRzxYrT6YvYGmWOsxzvglcL9Eh

g+qiXZQm6b0Ksi4Ed7qpUlAQ6GUgLUnet/rp22shN1c/M7tXymn4GruAY2Ozt9QhOu6QmqfB6rm8TBO6

UsHPctlISZGS4oEss8mIZI949tELE2Mv0bEvxxS3Mq
9OirGPV1M/8I1hVaTha7J2mqMOp5IACnomekXW9rvCHREFMip0dOrIxqGP1hr4beki8BL0NqbyG1tSD5

upaC+o0VtFAxroMdokGl8LQCHnpC9r5IHAZZ722+I14DyLv83b8SO5OkRqKxmGFK92/KWJPTXLe9DByn

NTXpk+c7Ff0d9QcEsvvYCcb7oBs3xHK2wjhKUFpf+i
KGCjjHGUMaBTHiPtlkqIrkUovTg6Kwf2ETOk/ekPuXTPcYYS6EinoRcaW2aIwzHCh9gJPQXoM9/vwwgB

aN9Sjsx34KoBfx6i+ShUFlBeHCA0dq63hozsdkm+Tzj1UFymQaEMK5zioUC/WDUYI9O6Ic0bA+2eQWSb

Z9us7J/pSc+UeTsM7w++plcI4/PKBenwOtGcz2I0lU
pACt+x/XhG/n/V0Qwpyu/800t/c/FtJh1516mEexKret1rSI261zNEAwXAAuuwMWWtE8ukU+BeEWcx55

bS0co43mlBqxdxQUefcBlhGBcB1MQnQp6AvfpoMUoer268xpW7H18AtrjszVjCl0obQcWrcbB19xn7fR

5P72cvxakhKou6Mjb6+j3HLTQmvmn+Vc6cpXGPCiIs
F1vMS1bVb9ggrv3UJQlaCLhVkWtOAG2POzD05i20oYHGBA4R3M75BAWDie/SlKQXInvsP9/p/rI9c5Ch

OeKUTJjkkEYlvNN78g/rHq7Ji9Yly8ZiZ5fWaIxW49F2ZM3Cgz6V62s4f+xpRVXkRcHoCSF2UBRpkMcF

uc5/LQbkIGfls8znr+56Nr6JKV3jp7LkwJo0929UaW
GtZG0IQWB/k056dzzpPDywCq5usMaX8BYrdGO+9TZRLWkedDQf9wSa/j7+PyngtNoGhCyEWAKtJokY9C

jnogvu5YZEVC3JTKMffT0ouiz1y12+NvD23/d9ZNqd814zkNg7p3I7TdKCqKDUQ+vvcOcJ/qrZY9l668

qeGK3LMnq1PS32w+UALp6ZtH+R0HmRAHeTG3pa3XK+
iR9rbf/IxEIy5jVstrqr5XdWTEEMBQB2zFr0gSEDt/Qe4JQxkE0Ayq6ICCfnosQi1eCeGZrRDOiSPFZv

oVwhAmX8SnBDshZF1kwhsXpi9VftxFdjq8loHpbTb1CXE+e0F2UX7dyyW5IH5cBoTmg3x6HcR4Z4r6I7

atFWdDjjvfADWhACwUVPzrXsR1fu21gQeUEI8mY7Ao
RSh5ZLBFZVqYttkm9KTyi5cWi5/Et1CFuPywxbUwDFFBVG4fk5DtzWZryXWVtB2G+suoY2kJ02+RIzlS

vctjdyHAxPmpjONboGcieMPVubvgxbR0VHHTVbKBRbc6gMlfgbg+OPMSO99lODTKU/YUv9xaWa0yFI06

3M8CkFURSe7uCsCV+7FH6yiirkxbl+P4TGpQQXMoZh
Crj35pkAL9gvEMcztUHn+xuuNDXvJ6hxpb8dq9gtyaAlZU6e8HBH4sJ6b08xozmxgU3p+xndXAFu9E5a

fSiI4yo/jS0BefFatUSPn8tBaUZNKmd12g/tCOuvWqTFbHoY/pPfdLLO41xD7W8W3b2wL1qIChNEeyHS

oSyf8pLR2hUqrdS8r2tb/6peFAK6gb/ckFYbOG4HzY
kDyoxORqDgL0N88XemMj5tswx2vxwtYta2j0geguUQ0irYBcDYtq1NTblnZUeL1ZkcsyMWUaHb7Nb9wv

ckpHssOEbIT3Om+oadyAjVUHU7KI6/LQ6S4HS/zRww6CbOk+tjxYt7tjiB+lBzK7LhApLz4tJ6DW/bPa

fIX25/vWVfPa8aOOULtSw0NQVcl8r6Iwdm/ZHMZ85A
xIHsEQvr/RD7pQoQgAWd9vI4m56iamns1waqlxtmsns2aaKLuhuI9i87qNzdF8ctc6uAIADrKh3y1Pm+

Rh9tskNtjsp9Ffuc29lUDabXa4IEThBKUGcgCdu5eybqSCn4k5iv0nN5TDRd6j5DhxeOivtZRfE98YDi

aLvPDCoBx8LPuecFgY52nk0HnryGZjS4lsbRoZXcTa
z/Hnrisu+Y1yoWiAsd87MCM0JnHuSYeWPT+C5AIx1L0VtXMsKvrJjY/WnFdMWaRM9eo+kUxqmN08t+HP

qamS9E4lzk1XRjY4bR0UBc5v42x2VywQLiCfyYmAndxV60nEFya62ad/mRIj5QPw7QorJcXk0rYoopDH

gQFhUhByalhyZ6pSf39tOZWfNl7dHT86RWf+WYzF/b
TnmPbi92GEWUkClgUU2qR8fi+uU+kcrKyHsW2VvCLAV7rKCEjxVMqgna3XFFEPX68dEh8uvkfReL3X67

HBwLglTA85BAAljJZ6yRXtQtx1XMLpTfAEER1m0d7xi47Y2zTcoyW/8Z7/NRIhIDPZ8/WdU0rxer3pbt

JTkPVkykGJJLiWE4qfvaQYyarqN0g4qeOKT37QM8Ei
vNPlM9rXlK3EK8FVciMYAVRsCtEIvn3w9fTYFYyncWvev+atYfvZAomvce8pD4juv1uVK9M2ypezRcJB

x2BTd5wLCd2y8FbRyGOIXBouiUY7W1fG8XZYGUAoESzI6qb3CUHKDF0lc7ZUAzyMpdd3tJ5zKeBIYR8V

FeW+SJDOS4W1pKW0lT7NTum74Etrc7SNeB1OAxJ+rl
GT513boFpZ+LxBCBxgwHES7rnilCmoR9Yz1gUmSnrInrmtHYqW1NZV8iTjbXMPHLqO3+4va2xjdR867L

pfvXGNNBLDI3nAY0ruufHbma7MvizfJ4Ff/T+9stfkA17HDSwVK4zWUMP8gig3B4DsVZa8fstBpkZ0Qa

3EX3m/6tsBE5ZVoAPLbiHdFGgytiiC4DVocAx+AYmd
bi5kw2xE53z9C43txvkBTSaEaE1X8soYK4liHVzb3YyHbrXfYpFLeC31IMzwsulWecYvqlpKirBM9/Ee

vpra4m17zFrhlYMjhhgluBzx1kBKV5ZhhesJq9QPEAc3iS23oUP/RdE7Yy53Wd6Wu5zG/whVulOWMB3s

63ynnGdhT+V5lXYbLam4SqaFAkROF6uHtTapyNsGXS
5sFqbiUfLGbfATnM6uqBQM2xhZApLUp6oDQLgOUFPkLCxq3Td/f6LQ6iFJIyYfhSEkimoLgIjesM35IW

NagyQ3yWMdLV4sa/nyfQh5ARdsi3Q8IrfR5esOYrkod2u4MS7qKxifCO6jjaMZveCbSfxYkPSpkqJ+/X

mGRddFao5DN+E+i8LCb99v5gZbCvvdQXNXJdyZcmqq
0zP62pi0vN2dJSwjIUV5FtL5vWRY3nGpnLO287t89zi1RspxOw0MKJUQU8xVJAMZdxo4iVYztdxvkZIY

f7bdS1V5z9vtLPBKqu6UCVaYX+6WiTelGw8t9Z2pgjWszatn3zfDzf06aX0TCjXrkk7xtmAf42un8/G5

qE1qofxNODcLbx8Ys5Xlp8JrVHMJQ9wzDC61KrZlbg
6akeDHhGAhQIYoXQj9KFJOaxpWpm8bWNd9nzb0i+Ljb3QGl6PZ4hMjk/4WgL3hvh+1d85Pek/arNgsY8

pP8Ts6G1cje1JXPme+9rsUFZmMzOCjEJtq3Bbv54pR3b63E39BpTKV7GHGEHW+HHVkQ3GqLwx/YbFjC0

zXuDdSzTjWkJazQmzmcwMpEBEj241OYLn4rjv9z/Po
HrgyEQgg3DGbNuEBIDRbeDjP8XJ3QMJJ5L6WnsM/leivG1YN+sG3O8cT3C3FW73sRwFX56+MOuXQO1vG

rQr81ttvPl9DrBrFQEnEnDMm7vk0EenSnoj490PCBApXh7IJ2ftlRbJYnvp8aBnl9fDNh2FXWTsAmUXz

x25xAHV4DyzY4/CCoy3eRrWmIF6xn6ha2rWnZcG9ys
pYkj2Kxg6G/zok3zRul9fpk2uAB1IEqMxvMj1goXAybTuUzEKl1NsmJM2ryEfi96yjeGRMQ32vczmrt7

iQ5Kn1M4fJmD6asC9pIoTonfFqhQYWJhPqKB3/DqXbdeqxZAhjfXpSduifEvgQQvwgRxtnyuoPmPr3sj

s9LgyxBSPr7avWI3WqNox7aC0Inq29vG1a8FD5YfQA
vGbEjRYhMFD2S+EmS7yrAUuoXVCn/qfvt1Zqq3rwyBNyL3X2SuWo1g5DcnENSMy9rH0dns1KgiaqeaWz

Z+t3XyNGXZx0YSH0dheHfQRlqQvUok7WfN2qMJstf6nsIxJN2oQqsqAwZSvS+3ZD4g93ut994f8V3Tfn

wCShg0DS3gffpw/CA/aED5tPJCD6KeZ75N9POZxj/d
p+d+mo4JiW4f/Nf/XK5lDGYUQ8m0WQFAruXaIdCWSpiuQ8ZdSMV3T9wWYot+G+HPl/Ya2AwNoff26Nkh

tAQJj9mV/rwk0wlvf1btNZ8Jvss8P3AgB5jaBfEd0FHH2br+B/oUc6Dus7V51ne3euRX6KPN015R5u63

acnNJdn85BLnLRHA1i+UQy0Cvt9x0ittneRtwPMZ8p
FG8ku8ZME09TjKB/1lm7/9+hH/Vhuobqb8NZhvTT1/BXD90DrThwb7cbi+Mf0k3brnQhbEEcocsWR11j

xc1c6FHdTv5qUu2ZrrIcnpS01sZvlOfbSAQMtY8MfsTUUfz3rVL0pP1zkCcjRbvYpBw+us0cOna2D4Gv

gR50IpwR9mJqH2lxhBiFjA1uzFnm6TfhIj5nq/7j2+
1UMeJNMcGWM2VBDjTvcI+/zgsTOeCrB6E+HQILGvTVJSkoilPleglmG4RJyeqBrH72qVIzH+KVD+uQfF

Fsvl1LLT4yIcoPhPjlWksheJ2z7gsjBfK2s3tHL1MXTz5niWSpAsaituKPmJrwlhWZMqiWAPhaiPa6R9

P3oxB/bWJdykuO2A5/H9xgMShRzJWqX3ojIhF35FIV
D70BWa7jiLqcXksHDHDXX3lJx5D84zq6524Xt8Tn/bTEMI50/wmehrD3oPSaWfDM1JbuZUAojMK2KzMC

KagKl6sv6VhY6/bGapSfBPjxsVUKH7sO5HUkyl21xV5bign8b/Zm1OkxSqcjHrwsMgoRsv/ZY6xsFK1T

Wwu7Xv6WoRAzZJUvy+Zx+jMseKNW93yjZdtcDaAzmz
Q8nhWmtJI9bfGmUVLfAaKqRA6rbmjfNsms+2aa7mhuH4PDk5+2Q7erlrXEqh/xhT5C5LmI5C3pNBzP6P

u7xE0Thg62AgezrVui67VkP0XAUllwjqI0ZmLZq7dNYqVqvnt/dr6VZRxQ9s99tzeZ/bWJZyLP2MJ1zz

KyiHPmMZ+7t2BZ+bD3zgoFfpoOIROliC1VyTBcltcL
di5ox+/VO2u8k/LIoeUAZxam4f9Bye2wn8PcHys9VRnvr6opINcz2/BeuBI1nweVlWb3WSicHGQwFQb+

ilJHuyw9AGTyAYSqxfEi/kZEyreuF8XPHOSYanQDbJVI+3zGCuC7+fKkifaXSB5Hdv7H8yjQURPsd91P

+84JTWhndJA4Vv3R97QgCb7gum3Y4ydULZRyfW01Rf
QrDsJejCh6P2sbGZlOyYC/1Zl7JTSTVRcvjuOLVIeofYaV7oBO831MomG/1zELbICz9teefPXz2esUqC

9MpIvFF4X5yd7obsuIsj1Ps3Y5Ug7dXfH7tiW5V9x5cX4JLiLx2Qda2n0K53QP/92y7n9tkJD01WephB

loRBmps+571pd4XdCYY+ABHjuKgi+rzb77Kf9uKXFm
cd9ZD6b6K+iBqo5/EXHf9egkSZYWPrPtMTO9wQP7b8vg9Fe+/Uu3HbUm/u5CFTikmMRpeeQZfqci/hat

/3b6RB0JnLRVBhVePwvgMzZPEneJ56OEPh7F4accM7sPl0r9LnT8RksDef7w2N582A3zOA3U/7pqt8ay

md3MqXlObrYDQvgxzYxYGsV5kDGAFYw0PHbw99U6u1
hgXUHAN5iiVZA6RjE+AUxsJKlDRpRhqK5zPAAK8eYOKGs0MvdLm16TxAQX+VE+CGKFH9bgXPxpWQGu6c

3j9OwvWcuvGbCO7VlAkBJLVK+QBnl8oIev6zxL8NUoJHn8B1NWhfqS3TC9mwdyfTZzDsdDsIe5fu1f4V

n0ht9nqm7URR+zGIMd+6+jTQP0bzjbmj85iXRKq9jz
Ar5KvorvPd2GqSaV40ehnN+UgJ1RMJ4ANZsKFyinhyHVY7z3TC4BRBgtxDAaoKN9GHVPTLXEDmTktOyy

4vBmEWJ+qA56tix8qtcePh0YA8ulvQ/CpAD8UWstDwQgOwvGH+X3Ae9SqtKefQt6VxLpm5eDqJBj1G/w

ruP9jjvTFTX+M3PXodSPU3IsXsRcq8zBmqmRrUXjSv
6H3hI0p8I5m9CeQuguQN+sk6hLB9ej+GULr64J70WPJOhQX9OZgOSDTetEN4yMizG1eeSdy321iEC0LJ

7v3/HY4FP8L1Lw6zCVFS/k3kFFdxwu1h4rJVmgUnnmgDFiL29+rnBjeJjxSvlLncwU3atnr5itZUl0BZ

p2EKD5XAvux8zti2G7u37N/4NxCtIdJskXF2aNiE5m
HpmOGSKQbXVF30qkHLpYy/PHDvhfCAkuKSgJgHduy0dW/FvEa1DnpvMPjUySY7mSjy2OQbmy1ikOVeMp

dit4maizWbWHQEI9hxjVHNid/3DeZA8o8PwRuCirmPsm3rxDMO8oZ3K89wUDejTelNqxj+j9LhjNnTSi

f80YRFXXU8i9x677aVo1lGRtPa6d9i84bz5kVH+WBx
gnp2NBsUfRd5Ti/EQ8rvzHvEAeA22kJ/hCrkvk9DR7wvIxISSanwOEex35kgSQ7cFayFFYmKkh1TxVIZ

hxAt8sMD9R4DcGYL7Ce6A3Mvk2ICelLxbNKvW5I+yF+/4oafbwG3+q2I5Kl2wSl3wAlhL327fNK6NzEB

tX0OOqY3t5yrtM+XN+mKH+YKWtAgWTlJjlz8Xli7kH
SUtDCVz19z9yOvc+rmY6NOFvqZG7TjKTy5/Jv/D+RZBxhVJRXQKn0OlI5G8dsWVcyzd83rm8nZV033f8

iCEAzmXTNjn7qJNLBboI8DoVx/8mDXj+XylX/Ur7Xg6bK/X3tKG0lcGC89kSxbw55n5QhcCG10+Z+P4B

sHSVR+HgySDDe9sTfnhocv3JVB7i3H88tbviy34ji0
v+UGqd4t9Aj1Lbq3ldjXM032k8Px80zS/9D5YnY2Uk/9j6/AwX3xW6uZCHhEa9fUPTJjmSI9IrcC37sg

kyXt9dRs7v3taZ0puTdvVFHS21pahOQ2N0mHZlmaGIQ1b/oAebBe5uY1Fvx8R7gkj/1PYjzoNAEdohUl

PZnyOMzi7rUZQmIrEgYT1cIm7PSTVyznIJaOTssUoX
nnFpepfa1V/A/AKhj16nPViq8ciAw0JI3uEHIC0BHj6QRvRxMrMJHUaG5VsZZtS2OdTe8mK9wXufMv5S

Xn8+xVtabOf2aQRrXbauycpid+LigsJ93c0RENEaNVj9btwxvB1/Yi5bWHLb1s0nzW6EZG5HRGu3Foyo

USshvyCWX1mMCKn2qhZIbhhaj1+QZTWvW+QRGXckwC
oKvhdtHyJKM0muS6jYalyAtFFL3LZ8AfnmNjqm3f0Zl245fqJ4MZ82RRldcuHjZ+2nd1wJ/Z9zDrHhuM

giFxFm1GbFFv7NiJBNsmpDYgWAfR2Xl/13cTDhjoeu/e1tLpTXJV4Bi/0T2ohi3Yah8+KqQ5/Kee8B7s

mO7Fdguw0gT2u+Ej5ND+FX/FHpSLZrZOd+by5RY/fj
LQdDA0NDL6IywEtgSBEhKbiHaVPWMbsbQ1UcZwB8AJAcZ+SEzZOacxv+9b3vXUtDfIHCkTN5pzfBnJkA

keD0FbUWGfnJz7XGqsvTtx/fTLhF1LXikut4ZsD4mPMQwHw8WR7bt73ws2jzGvfrpty6I00lP9PVxiq9

MkjfzPtSKUygovbZ2rDp/14JSZdsN72JwM9AiZJ1Ya
Pf1DDawXccrhrX4I/v17E2b0AcbydMzuH9S7F/sFsok+jF8S4DHMLLe8yiWCMq3oEYqhipS5cs5RdvlH

fRs244yVdYbg3PebeCrbkDWTUa3lk67lWDZ5iS5nM+uJY5nc+FPKd1NxlUg9GyGZnIf5qS/mO65dvVjG

pifyJUhj4fdCEDT0cf7/WUA0KSG3nBubGiWku1CKoi
uEHkf8OlJYhA9RrgRgPtnvb9JCBSP5HVOcG9zUl2DURT1xO1WjdgtD//96ujBGjD5NlTR31qxgPcd9+a

vaP3ZAbOJcp1Q55DliLA9Ccq/9VhXLVmx/ZkOlu79kIS0v/KK2aa87o9XivGw94/K5CRjehXfjOgHnxu

tTYofbCr83+TPSlsh+Daq1AlMXTJWzOKkjtCL4m+Vf
wTsLl89W+2z+kP9Hzfk/JQz2RuwwQ6YqMMrnN294XPEobFOhClP5F7sLEMDsVCGfHePFhDck9VKDVUhQ

qZ9QeMAauqBsn4aW+pyYjBP7h16YNnk0lx2Y1Rc1kXGy8SCKI/RQSZu4MNapwJSQyu1vKzgg5k4vVkZ5

C/6JJSdBK0szt4RXe6iu9Qrh0mhAKcsYMkduTKnCj+
dwkGe5zTD6WMPgdJ2J72VBYBaB8aQDxjSq5GNMSH/mhZ5CRkTw6EfVce6zP1jF44Zj0UvuwAoe2tTxHU

CCPSYQsnwkJ5zZQzROZCVJzMxVvAPxdn8N9ifIYW+bqG3mQwZkl/kaa0SEqJgWxKTF5Rm1XjDtSP09Kg

J+w1FSxePFGkZq6l9wq3hgblqq/383AnicFfRbvELn
HUaQPSmbV/NSKXonjwNFvKrDN8/4gX203sWpRvXZ0HPBE5PTScMFiYYWbmMCLHWZRUUD1wzZ/YVhUTv7

1YuJukwcecm9FUzNFrFgPgYNrwf8zchr5LP7gqJN/MLa+QxvD3DMVjch44WbwTK8mNyLi6gVNM4Qlft5

LPl/vmQRxJRJ/J4FTkI7VUHkS3F4HpabGJp9623n32
uv3e1JkIDsytBRxvzW/aKjbkScbDsO0lQS2QEcQKjCcbi39lO6GPn3XQ8issDzaenfaY102iNGTfJXvC

q7URXgBdRk1iRVDTv/pn9O/bGrujjEGTkjW64fOBeZGX/cGEFO4yT66csUrWeymlHbrzUM+Zczet3PQT

M0MiKnGgY1AMWMKcEN6pJ35v+zNiQg8r5HuWbq6l9F
QWGp510wt7eNGMsHGrHGSk2oD4596sXbAMr4r5MdDiCkqgrFa+TbnM94rFTr+I7RteMl2JY74MOG/KZx

kbJwggU7rAvL1sxmva4ljyWvE6KPNxgCj+Xa2YRtdcetDghGugwrLatbgh8eJmSGXBjRTHjUIqb6zGEM

OudhnvlTJLiOe/M37+jHlh03IZx8IYefY2E4cpNc/i
BRKB+f10/UYSyTNv0051UhWrJFQ+kKiML+nENH6mQLTx+e71RMBeMlB118GViJMlfWK/4lCTllf3IPUB

am1+UfZwCwxoXS9sscE0GRUAgvqydQEG9cEHVaFuajVSRQ8U3aZ+TwpXye4t4Cqx4Zx29loH866FoEt1

rquI7fjoGTnsD+Dc/YCdCbjHJ2HCR6BX9b/leFDEma
SjVFpCxdtexDdVm9lZyA/j5Pk7Nsy8yXX3Kk2dU4riEoThAzMrtPuVehnmvo6eHZmyAfWNVfTkEr7XA5

c073DHvDU1ydcQMuEogNs9BPr0EqvC/dhXJ3MtGOwKVWVxiPcBbM+KBgUNgJfu8NJWvMM2qFlNi9K/Qd

ggzwUS6uGOndRsPxEsnH/U6YG0rG5tPS/pgkOq9Wfz
V94RdGGxhfN84A/VQLk+Xfqst+DCbGbYx6uM1b4L0W4jqRjF4OX+RTJ3Pkmzrtt/BHd95Yq7I4cslTgO

dQrgnH2jirfMK2/wHCtLW0HKztSRkSyzA0PjRYY4H9VoYkBhbUdygYav7fyqmpYv2VGnwqHmLeVS4TQ1

Dumont+/Paul/Vu9tmt6lR08rD3RqaBEVDg0BtDZPUlPB5
oH0lJAgaQeNZstKGxI3NpQzh4OOMGPZaGfZGH+EMOB1JAA9N6eY4W1sKkefScwTwuBAyKkwXV7BHULiL

a/2IAmDJ1BaEYFpPM3HJ/7o0OwrsC50g6Gx/ryrLZZVz3AG1mS/vAOafY+gtT6MwsHxFTxa/gpYbrdNQ

9Nx8xD6pfjWyFlx5gspPC10N0x+G0sXC7RhC+MKu6D
YIivZaauzBjNkN2ZNL/Jfm5DnY+03uI/Wadam5eDle7cEvhLI8QaPloBIJtEsgUQNnqu4CAc0qNeXl2l

++wHaGen9XY/nHyDAoElxuC9DoZ8Tf4vFR1ur70zE6Kg3070kiOTqRM7Sa23U9UckZKhwnZYqsVsNtMn

jm9LD+4PUOiFA7T/WipAa8dwQFZIdUGa+tXFR6QEhS
wS3LdikuXLal++Q7iwkkIjgVMKPSXxeIAN/mwfqzKycqm83J002kHqqvyr75+ybaLRV87sfbve/lQIPb

v84MtkJpYuMzmwy4hiZEHbO/tptDrZyOxh+fXcItVZ3LopznEnoKtbf6GQ7ynQ6AQkPm5LKtVIWQ2/X/

roWRYX9yj4YabP3DP4rAA00zWcXGTU6nl4xpgLEmJj
KQPTvwzOUNpKrh50anzZ5Q4bu9gb8dn76c7zcNTS0vXI5BGFsfELZBWRiS03iqsfPQQVDKtk33L/uCaT

AF4N4goTJ0xCSw3m7pMJfVZty2IbiSt0OlOyowmaDXR04fUzAOOiQ8Cyjvndx9GW+d5kRpG24W1g11cw

+yGh5SIeKg5hCVfB5YouxTDLEbvGfGmoccl6HfRdOR
wUZUkqtcN+IfT1IW6KE43tTRVHP/Mn6x3uB2bwk23uwolzRFatbT4HrwiUTeT8CGgAuSB/vv6DaVig+Z

ninpVe8jQT2wg7g9UqPVbKkcIOli0p0R0NL6Q96Ssy6WYye0VsJs5PvT3w+QOAALufv+25qqWSf6mRVQ

PmATxMJI8HasgldLpVI0m1ua/a2bS5ZChXzmXD8V4w
h7VSgaoovZE43QT9ediQf/heboeP1cDGBrdxAKhUF1bpO6EwL7ITTS2dx9PKrqkRPoP6Qs8BrpYUKQYi

vS/PBVX5mpfn2MCtraq3mbVVkYszGes2ajL6rPp+EYNK2a74hF6FnqqWr+8SMmSb6J+AuKpLDifeUyxY

mpr7dt+7NAb3sWk5TpiaxAGeNnSpxSO4x6YdSycaYi
OtBI1atmtNOSSNnR6SGy0yu9gBK2gZY9YSrza9xIVVaf2O2ruDusOxA2a2oCSkZm/F8gli+fC0q+0eAC

+ocylPStswwBpVl+AmJrrkw4uhCa+jcEtBc6IQLpl93p3iJexnpgNZQ1xhn7e7SFVdVYnCfAIDbtAq0s

QeY0Ka1x0l6m/FjFRog/IG26Gd7kcIwu/J/rjw6ESL
Qf2N4cuQ0Z2+6rRslSXWmlDVU2Vq/3eWLvQWoNBYwq8SQ+NYNJhFcbBYm1ZfM0iLe0lzeDzTAShQ5lv6

bp436N1sCUTX8/K6o3mqN5PyzInM3Y6rPUFLMxUvNJoq7ZARNP9w5UtRThv8netOrHjuEaY+1k2NL2nq

JVRIpovxRgz3lXZ/21IUmup2NNimXJqv/E3wYABTXn
3Wdq3LAMyxGSZ89E1TJM0eoR0QWONIajU7cmLYWKT7r+QdvA3YvzUBiIqPHuK1VgmJChdhy/DW6Bd3T1

GtH8y8zyfWMESpUN2goKv3KeXJUMKTaj+Y0kvlBeAu7HM/W0V5bovZJyOgaWSA2WkbieG1Isdu6Vwx4m

eidQbhMql/C2cM6xdmt+FKUTaMmX6hE094XTiZv/Vr
Ecn4irkOXzOeB/SJT8cU2CZMlrkWuzW8dF15IVt78B9pdNzWVce+B0DMRsSc6sdznZrh9+WPPpNRRGbr

0ls1uBC0jyjMc2V4jp0kgB6wkUyQ1XT8aLRoh2cf/oylJG4pmrNwea8tN/K81bcd44fnjyU4qPT1ms1+

CRQSIMMqab5DRJa915H3ErOKrDHRJjImFn9qvWgWSN
1wM2F8kpVtM3i4fQFtsJVayTzk5ER3gB4L90OyFj8dI1xygsu0rEVlHzArs7K0gQXqbbAYF5DCCjf3VZ

Freeman/WsQfRBTZAKy7x03GVu/vhVykpu2yjDs9IcEp8486T4/CSayF0jYC1T8km1afmFVwuEK76NewmfVq

scZPJzTT90NWnTfuf/p2ipVP0+O+LZdp8QY6ddZgCq
+mJu6uaY7L5Hy+S/Ex0m60cWIESlVDlWp9ybE5Mg8rB3MHvLahMTBKYWPzBpiAQdpxDsFJ417JH5LhtF

lPJHSJWN4YhJyzPzGu/5AhT4OAL8fJlVzP4qB/Ag4SlQkiFoSiomKzB7thnh8X5hugSGbDfH0HNdktke

xM+0NQb8VOzbTGBZw4F3nrsSQShN0wgxhGlQQV+ov/
oGL+/Pu9cig8wm7E5ykpLYcMl4xb+FlXQqIBoIKLY0Vpmg02/nSdNa+hphcNrGM4sx1ZBWghsokt5/qH

tT4t/qSJN//qdtDG8213+VdOxODlW4EmRieJ7aG3o2/01DBkzgs0i351x9ULd36aAQC5BAMyNRqQboA6

bj3ZUiGSQ1nuF2JPdmB6kRtxkKSXQK4WmTVuMsHMx8
Qns3Z7VRJwqxNOLLvSMt5YR8LwyFzkZQPXSlNU59+CPYJsr839mNYzz4knBqeWsSRhu14eolYJZfSH74

NANgAKz74mo5h1VtvIyC+9H9eyENb5jczet2kkw9dgdweGMzQC16pUP8sTCh6Yd8XgQizzO0oa171dDa

9FISMyZ1V865GfLPIzKP96sQdiGR+zvrT8/ZFYalpy
TZj+WLW8yu6iGxkU6wd7n/lIY4kyg2HqAZmJYpZxnItIRVZu4/MYkBkWNO+/y0zxxNiWTQ/D8Z4IvRt4

4eZvJXbGuK1yiyYgMRMhjmjiIf4ruPO5f57o1EyrQC7WGkG+6Ivm9NkL+TBMrW7R+6gtdH0Je8XCcXY7

WcYSSXWQvM2gRfyxTeYwzbykOR5u07FQHpZ9o3cb5R
w5N2yyG/zsSRRjLDC8cFVfIAZbKwxmN+6I57JRT0JZ4ar+8K/HYfreXo7CYafjYaQrw5bBS224+HNEkU

DtdPIUebUtfLsV37BeBro4vIM8gsrXTYOQuuplvp9qeaOTP2VWM4Q94xnK0tV7Znl1Ey8542EY9l2SvR

6S09M8tqIlhmcbIJkcXNOAx67u6lRxzylVYjhbF2Ht
2rfmmeTvHQbngmyHusQYYHPVdAAfD2LUADnXuG8LdEjpZ8n+oiBTjzhvNNONIV0Eo0bztrQORbRJLXbz

2AqdIBsRnFD52UfL2h2ye16atTcIB4c/lxlcAY66OtYTT7g/DzM8jWL63cpwMzYcWMuoyGKFtAYPjzTI

YPLBGpq4sl8uZEWpMd3DRQ0I6mXxQgA9elIhvgvQPx
GEpF+ze1xd+Tg+00GZMJd0LAYCiUrd2wFB20PQE90KvfEnnXshVclNdD4jO8RmXZY/yyQLixHc0SOz2O

w8MoxUydy7p/kowV0IukyMJx73hFwiduUMSXQQWBH3ycoJFgCrpJAxnOPRDhqHuWBcukKeAbhkS/Pa0r

pPb+y5/GUm4CqW9jaOB8Zny9zu+2KZOZm4YWKPmKtg
Ymo1/5ZCYer/l2Y7gjTOroqRPumTvUw84x17Jpr+Jl4Ip/816lrehQm3HcnWFeZxGUDhZRfIlU+XTBYT

WnkM6+uLYYD1XW/Ocnbo/FfUqT9MVYHqE1J90wUpb7RQuBUjiuhRuqj8yovpUg826+sbWEDDD6Pbp5oe

I0dOL4jPJSjPPclt4UZJIJwy0B7E+FrTUcqLNYjw3+
4hUjfMMvkoIS1Ksodz/wQiGumdq1ZmuIn3o+bwMdufgBfcgOQn6lAB0SohP1Jh9eg0LvOt5NAAGqaCy3

Q0oE9P7sEnqwEKXUFz8Bt6ROhFOq8enE+ZKd9cFXx8MLSeQmWuwZBx4TG19rwUagin/NtZGZGicgZYy1

l2qnodfiP0rNnAqAJJ9+DDImS8EEqAS9W+6q56dYFx
jLYlzthH0npW6c+KCXfSVfgtfliuPHoqzAICOOFKBxrktPOIhcoxHOjjjF5WxDTMfbzfEg0l2koCmacm

v4iFdfy4P2JxxO9FNU/6O43cwZ0rdstY/UuObXB0eIXpYOKoOBy108NcQV1CdxBkb+OCPElp0m1aCXEB

QGDxMUWo8uW2Go3N9LnnJ9/Wmtec0IFVXSXtKp/dOO
dBGd7o8NYLAZ+Ob3Yiy08K1dls2KUO+g3MtPZcdcc0//OEuX/KfGenXYowCoJJJ7cn9KOo3YJuJDq/i8

4CVFWKVqcItGfZu63HFIyv5Q/CFyzM6pkvyE2tohKQ1/rhg9ClJD75QjkPYFTlo9AtkiwaIil4i7ttnz

hgdz3JbraRreEC3pBxTj7eESnknk03zyFpOYfV36wV
9jpobi6qYMXGIqmsTa74fAQS9FCiqLSB9JRnI02czVegQRL00qS0Aap5LmbaOdD58tkxm06wp0rvA7p4

BwQJXMrqi518/pI3rneRi3JjwWW/PWB2T3jAVpdxnvQb/PlVtzpk/eCcyrUHFNnultphOXGiLkn/FD/P

g/JyY8NTB6Lc3iKS4WLv3AesBENgzMmF3oKSIAcRiB
LmQUadz3mzH55a+UiWnqWrB+4PVgmHnXvCJQOJiWHK2Dxou2P1BQilTmAatEJ90jz/FrOjGI3RNlc4LE

yqmBS6ogTVRdCFe0foseDVy0FUfZJOC/YnXoi72JF7EfUcOUBbcRw4HoLilLZzIkE6cTEtSWYPCkBXXu

ey61fonNWKUe2q/p10Js7NFELowod/p23JtpOOZarQ
G8Rd4CwMze0//7lUZRXVkutBIKtZJlDSS4pP1DSVcy/gkbJdAzGq+zKQWj9MvPaINYrdd0HdZ5gn0kQb

Bk2Esdrk0NudYnOqDB8GrpjC1P6HVJbg5TbMJASoRu5lvxXmj9AIXUjKGvvhqM4BHhEmQUTEhTYuFAVZ

vQEp8kJAtR/R9IEN/m64/7SrgLw22qQX5tWJLI/txv
Cy4XoHXpMGeHtz6xAffok//g1vMRTogJwQEvEokVdVfnf0E6tn61C1QsQ9CcQA7UoaKi59/w7Y2IiGoU

sak5g3Dk/zA/TLcpzaC9ZcUfxqVq0VCv1+7eN+y8BU7CeuGgU4Q9wPIp9a/MrYmlUv8IHj3M+XUFfq+b

ZxQt9wtSFwwF/m+/ONCt0q13bboNOX2BY8Rj07MbiY
+ATHsYSRjpsn4M/mmcEb6YdSrICA5pW0j4VEMUZ/mEb50/tfrEzGD8iWaxrQl4aWEA0NkOg7BGeIX1HX

3uXMihyl7gvDvXy6XhwmEWdh47FiAs+5mVJQ1pJ5m1qX+BZ3Kfk0EcoQwolVEB0EH+V1xyAdUkPHte0v

p1IVGYbwMO7QfeSZaOYh+295MM5KxnHK1R4DfMrZyz
c53LeOd6ehCTcfauHGvBdYwIw7RvT65eDnKhW8y9NiYyWQX5Y+hnu46A2T9GC8lzIbLqcAF5HehIzwJH

gzDyLn4QBlDQKXTefQFjpk/qGaaX3/F14XSVnYwnJ1/5PXxk8gAXIGXX8Qm23FA9BbSBf4XBa28ayY4M

JrOSASA04FbEbEx8B1gouVNP3WuhkwjGcV/Xghg/Um
UM5g4q3U+71htSC7m7qwKiLxGggjmY+al6/C1vIV+o3rr+ysXOow6y6tY7x8Rm0ueO29fzaOQQpS1PuS

p9olt2Pi0QP+kw+Y20bbO5qf5zzUV159frD9BfeTmeX8cGTjaHgTkw+y3BAluQgesG2RP3z8kOE1G2TF

rfb3cH4jCq10jkdDhMZZKCuWF+sBo+LNloTJ0MEtWC
htMBMR6+cB1mLGQ64/bDF2AMJ4P+LpQY7Ua5300yHgPEcixI0N37vpMaa8LUSOq3/kZR6pX60+KyW3zq

nRHv3pE4R1JrVgpBuf/kphrhwJAO5Gj77RoLiPlzA1TahKZQaEyqvXGk7mxQMn+8vIifA/3nRP90f6iI

G5ZoJ58TrQyAuBqqpE79cUPJ8eq7546+7/V5VUco+W
vMKh4JQ3iggo8NGFcNAifeEJCYpX/J2/HMmBq6nVHBl2M4OMMInQWm5ZiRf7X0ZtaAel53qudB71hhBp

usRqcn+ZwslbiAU/aymyXZEvsAmQRk5+Ye2gqtfwR/HHhjH5yc/ecx/H+8tqSM/jTIwk6Qw6TJkilKWP

aIKP5DMtd2ldrScJidVM+OuXVvCQuo5yMwM848pJCw
7GBwExjp/pXjxHFu80ZWxQZHE/Lxdoix5wE5efOsvl6oEkJ5Bl0UciyR20NHewNYO4lhAY8v+j9uTG8l

gf5vTgRr1dRgtzGFhyyPA1Iusmfsmk2PXUTJxKvf5ETCLvtPpTBDwNreUOjfocBnbq7R0PRllF2QVT13

VMj4T6WY/6k8ghb2Lcg7BPH75yvGkQ+ir8Pk8pg3cD
kFg5RIImZc8zdzB2RkpJchln8WNlVDi3lbGujla6ogY5HchA8MNdFhkyNvOGH5JVNtmg8jvc7Or5Faxx

+RTMG0WccAN4fmzh9VQ+Zh/PMcGJnmPexEqefEWxdd+CrbXevcDnRP03vpzGnO4rgFLdiAdv7ILfz4AH

K/VMsgqwbCQc0Eh/uXc6HDVH9ZcfoRH7fq575aOgQt
3lqVYRLw5CAbx195Xn/HV5G6Sq2KD70uNMJIF9VY3l4uGUnBABB2wfG5tXC11L72OdQLDzZGqjf1yIU5

iPHwdXOlxZSUbU949dYV50jpckjCSr3XPIhi8p+Sy9P7tkgXkJXjGEIGbMf5xFz0seXnP5prMs+8P/5y

zFUAgalL4l91YFEbV44kPJpodJZwXdMezSBXEQqSRf
GI86lftX178ElE1l1bdPSgXbdsYnx5jl/IMhz6q9RUbNvrq+LJyYrnlXzfRiEnT0L/42yc6ESbi9H9GA

zXw2aH+pVu8YRtRz5sP/vXbG7tgIpWufBs18nV3COMswJHxpkmk+2O0PItXxLoGAgQJVcql95cZUZFZ8

iz3SF4I14gOPDqQSO2fHkt9F75tl6LvuumJOM7hHGF
QXqDru5X4VJ1QRw8cxKr4fddzKPkF9882nPIRmrQYFvgqx3iPEvoCcl1i2pUQ1YYjk7esBRsVcnS4qqm

p26zZ5YkGnVFhgVZmkIBN85l6dapyYrwi4U1a7s+hJriAQETFoRP5tN57dLRs7u/njif2j2CV4CnciQY

QVPgBej1MswHY+Erw2GEKkvG9+a4LpMIeCjKKzhYdd
GPQ1p/opeXlEv7/xRKAzHVd3ShYOuFEk+umd23cfwMIVPd/CKO+6AGRp1YtifF91+nUTTF2ZLery3fZD

rq+OX9HmLVxjyVpOfEWQaJfaT1OCksJjesiJIJspfLZoJGtluIxfmcTX/xkkhe3dgwkFqadQzcEkw/Bt

ocqelW3QxuPmaTQUo9xQoDA8cOLy5CGbSqP1V2Zp1u
M6VNjvZ+x5fPB5vw7wsnWjU+JfsMS3qkqOesfPHkVpZewGUF+/qI7AUTBIYcochpxP64ga/E/9Pxifjz

vq+JZdRqvWfBW/3ldNt4FeIi5J95hpanzQ1LMybBmHnIZF7o41NMlxzIeFX1RuQJxB8plCx6wL+zzG6T

yiTzw5qaHCU3bufaJGiD4xXqMHviBS+IN6wwaEKdTs
lQbg0Lioa/p0sGtKIdZLll33rgg6Ni8DSKJ/hg8h3cZfw5ZgdEMfm0RGrZ3TOQvB3sH0tsWZHzDRMkjb

3/f6GYwDnditrVH06OrUAMkuRiKqSq9aJraXzChPD29HMmPV9SbCtJACk2h9CXCgiKf8KZ/RfcVS68xX

pqrfRWFSnp7pNTHkkOTWLQVXUJqkKC/lIJ0D5F6uqG
Bsd1q4zCpnyIDa5au/o6Go4e3/ODdwJJazrc/AxoEq8c0871H4rfM3+Kajrur5bO+oShR7ziavWdDsCN

Tdn4yHFH1ya1euivtwmll38SLa49khW8C7g9bvXk17isZ4FxdfcxXiaf28Q7Z0mBvCDsiyZSCennphFb

o9GREPI/ipvT0YZXkX2ilHr+RW0lrCw6qiD57kL4CY
0j8HJW8jBpvb9zWLJ0R9HIocJfzZW1Ym66gQoLcIuXZNXO7A2fcqHUvpltJtQI6j5hNj1CrxSBXHd+Ga

t3dydbnTxZFr3F8gZZJiyzJ5GMRTao1Pm5d00WGJNkNT4yQ4YYi5EoW+gX8XSGeqnPulYtTs9aoKT7pd

Ol6zcRsWGlYX7LxVu+nv5ffqFIEoLKPTqucpVzpu8A
R9qTQh7fh+VY12E0xYHcjOhNdxEgAmetd3yhEx2suoFII4tCasnQ8JoJRYlkHKCPjrkSXeA3Fgg9tizo

+g1iT8IYWY8O9x9aoXxbxg960+KmH81ishlIo1vepxhSo+oQDVXOm0zosr2l5kIiJmGGpKT3+teld3wO

IZSD7EiUT2twuGvv+RNyoZuGebEZzKMWTrv7t8WuVK
SyiRmwVBKrisR1enwzV34NsNh0Lgu9u0MQ1jl9cP9J30l86qQqg7LiyNrDqms9IFJ+xw7OL4mj4p2Zv+

QhrwjkuiJYoQkGebieFq4U7k/K+2H/v362vRnk+dYGm+XXPjexAJEMGXo5WsZGYEFJ2ZZAYUaP2tleCO

8EIYvcGbDs1XZzhstn2MuBMxjpl++1a520xItaIgiL
mg//D8+ix74uUcy0TyCqixZ6Rq2g3eChbLKG3jiGeBrVVJnZdh36XrYYJBgAWgMYbDeUPBRp7kA8t0hd

74dklHxW4YrxQvPWt8pysdmFJ+/GW8CLkT2F1tytwfONciBHfc+YdVzF8ApXMc7yaG7ynFCkiylRLP9k

1kw7HSY0hOMYQWC2hlCcJMiGxl+lW0yoQjvKMwE4Mo
3PRSf/hX/hfwV/G3xuz4wcXFloV57aX150kGWs0hqFZVP83S+Sbrqex7rtG/ZiN/r2MmPZsotLjRqjsz

7kQs1v8XxTyIcovLJbV/DpoAtg7ZGw5AD2zWOnCKOEn3UXckxkD65sch5fxCgO5I9RkgR7HnmBSoWGFW

P+LeV1WrG5mTjlZlALuuHqd4bf3ui1gno9qRytb9Id
0Fd0P3K0aGgcrW9CqZNaWRJedxBPcjRkcpO0+m+PCs2ju5kp9rxGa5eiDSpk3i0LLoOyXXigpKVG0bH+

1OXudaYQD/59VulOrAVRBkMNcw0MR+JODEV3/F4z/nY7lQMsaffEcC1NJF5NpW5meOoeXRoy/OlWEvuj

ECp+tXEjHvBOu9iI3GZ6/dWsLGmGc+IoQMMKClnDMy
eM8VPaXsPIaFfVXk1LvPeunA01FPLODLVt9hYSwMdMj1XPZAvYrXmIn9cRLwAeQ6lLCrpweT5GISpnIJ

GUsbCkDpz4VSqOnoMyLOwCnPxdbTGxITxTPm/LJwE8T/Wt5pwr5U76wc5NK8+5scMzGplwLES26ZmyKq

ksWbGQlfsbPwf+5h68hEx0nxobHBlqZBFjuQUkoqNC
3NXqxWE8zWE/OAbEiyzgJuF1qGrv23+b6Poz/wknj+kbj1W/7hbneedA/vmStg5drG/mpKf3tbGoSDqd

eY+MV4v7C46yweZ7kv/Tz+oYikjiGTeYtnZbMxyWcMlA/0hGFs855eRQ/UIjnYZTR5jaSEJ8UJXK7WQI

1cYWdKe4hhjdsPQc2blibfiwzAZyOw+K+DVNxpVhDL
kSy0609HE26v8B+pQagT9Sw0Vm3sft+y4ym6QGMvmaWYlB3MdqIBn/O++FMpr5eEmaax4HQptLtJmtUM

barwLXARSfwEWkL3dSXftFdV+WRbEQr19AH0HnI8G7iwizy8kLwgdADjwB8u755EEUG08/IDWf+83hHq

ONctevHwK58y3oEnEL064JLZrzXrO9GEmwa7j9qz5U
XiomSO9myUSBB2DhgWeLCrUwlztHgRhc8zOuL8SLOetWIs084wnd2uheLFm94h0rFhA9OwvzUvjm4l18

pfu4FrSlr1C5J+4gGYDrr9WuEjHbQPI5nNI3B++Q/Wcl8KbYn/3+SR1Gyc2bF+Yikt2wTi3nz1V/sBKu

o232xLI9lPQCxwbvF0aFmbaKKHQa0eYTVolX6QI0lO
q8L9sr36S2kCPdFrXWgXVaeVcOL7P3oGd8ePXaH04XMAj068BqXz3AG36nTNOVx5Sm6kGVa8jbK2RKH9

UpfHLEwb/ydOlL7PjbhhN29JbFEqDFSLkrenkd/QhwQ8I4eyXFbNMjIeGq4klHW85yAi5IBbjwWy3Yjz

CFVuUFZaVtnX4Qs3FUKq+Bf682NRvI8njY/ctv5T8m
6H/2H1/ItGBTRsrbp56oyF1j7tL5Zm0hc8suhAb5hf0FRjVnfv74xt4WeRQnsbAKhSRQQK5fMj5cucuO

7zJNT/nNRwiHYRB0SeVlEVVFeVO7uP00U1slyTZE+TA+kh40kkIpDxG3uPgEMS/9OMUH8Ni6lGvnn7HJ

TSbvtzByv+XXOm26vrRciYGO2AoLGNc4XlTFr7CtYX
K9yOubq/QikQWVFfIOkjrQRb1pV9oHCI8w8U54wcc8nru9drz7yGxBO5D7h5+dqha0PKjlNqfxwZuNiM

mOFr9Rjw4NxxIbBM59dQR+3iMMLfRd6i7qGHslJM/jheVPGYGHXRX3gumqSsFeo8/tadlkR8Sl+56mWb

QIhBiBaML7VNH5LZPw0W0zw40KKmLcmUfHNp4jxo+E
2Y2rQeGmzJhD577GoiUm6MB2ukj7AIBhE+pm8vRwuMSiAkHt9aH/q4M+62U6YiXwywsfIhWuMKOaNJKA

4HbcTx0FJZrmzZP+1oYvTc2hxiquS5jGthEVS4ePmqsh8te8qIC7fyutQ5AGZiF1XXnKB1SlIGsqEZ5K

R8er1ak7oM5XJxWQA+KPPveZJM3+ZJgR1qcc2k087+
UOrReF3UozU/q4dRrvX5EByF4TY8o8kxcCgaU5DPqH/x22UthsZ5z96DCgdxjt8adZ5rBAqGnfUZ86pA

DeLhzuZ7imffOd/2iCQ5168yqK1dQ3+OCdA1Bqb/YdwG+u6WsNvMBzDosBVgzzpIsVNSDs94RJh4fgDy

z+dI9YSUXHROECdY1C5DZa/po7ztFKhMmozP7O5uCD
GEEy+WiUH2LlbS/EFlg3xiXAzH895+wyrYhLTiQV0f9vr6tPSADu4rLgKcBAqniCfecZH8Gw7yO093Ee

h6CwSvoomW3SlxrkHXfQh5Ky0QFg1796JmsbTcv9u78qgn106TS9Wz+zRBNcIYvjxH6mX1oP5ELAF6Kn

8Ap2odcNZ6Wrgr1NnQ36dotWrG6tAAqTjhQXvUXM9u
fylxtgKWXifn6aM5p6WC4jD9F1A67dJVHxnPXhZtteuHKaqCX3BJkmeC9q1v+/lkgm9LF2Qvk9dfjEAf

p7efTF5j/gT/qTuYBowUaH1nvkvmhVQNyNic1Uobg8bW42d64GKKo8M/syYQPLi+6Xab6ubet+kyouuL

h+W1jW0ztzWG2BOuZ1l9LcBse7UUYkCH4IQrEscA06
eh/9Uryc72jsqxlnay8pvEdgTR4gogc6JRAZpQIp8bNwzB8WiLVGeB43CRlXZ3PNxKiLzVLAHbY7aTcz

KuDFg3NZS8yhv1Gr+KfVDk3AtwU1TZ5/porEVbnAaSBVG+jrqXLqqLK0CgztSQTBrQUqCo5WteoLgqko

Ys1iWtnKQMu6DUl/gyoSDeOWSPQWW7HnYTjPR909dh
CxWY9zuIfG1113nWV2VHNZuVusVLAJuvuf4ueWkXF/GbtJ8GRTQl88udZ9H4teNyKc24vZkopVoU5MWS

cSB4EBchMrUhDWI94qEpN882xF4LxLtFbVYbsPl7cwyJq4WTTY+yAm8gDA78m85dnVeKm9Xkt7Vfg0/6

IONrCq69gBR2I9tYHWaWipt0meBLaDXk16Et7E8hJ0
MdwN4aTji1NCjnzeHuRVQHhPm4qdSs/WQ6bZ/xin45rD3FX6LF8JhwsBEG45+QrZh4xyzCbSX5YePcAf

6BQGQO4o5JVhHdvaXQuj0JoClrfCLIrpYBG8ZNSt8x9ZqXOLaimpCsIJEe22BL51n61lG3jfb2JRmgxC

L++ZltGqysLnFLYqsb+GPmSeiTtzo5ipqcnUHz/S8J
Dp5JTRUeWeFlWoRMk/nL8mC4EE+8kjhZfnGrtgrPCW7tPf0SLcLrYYBte825XrhSFG8FYocJmqV89D6M

9gX8d7NjGCQLW65lUUYm/sc/vazrCdrtJYHR6jGneuHsEnwj9q+2tylMZYG6Mob2SOE242Z9sau69hAD

BF9ktvbbynSrKaKbH52A6ygcBA7V7JFuIf/1n06CXi
4fk7iqhnP5FiMLamqzJIqJVMtYr9ehp0CUm+BMOyOeIofqLun9y4GCRXWd3M082yLBPZ3hkmlXo066XX

Sf1HjnvxrVKJzWg9Hc4qI698MY7w5A9osyyEz9DgPkRB962hgCVE8dgA+lpcAF3olio/AY1q61fXYgkt

LsdWjehgt1G++g4QblWIRUEbGzMImtq4sJmc6wuV3r
8tfQADfRj4gemxxy2VcwQIv1VzRmUBdMk2CkAeCFxfD1Ket7AQ2vWsHAJWl/9+efxZDtzV2klhGxYrFc

YDcmg3vmn5NXW8I1V6zALU/7L6L6v/svovq/+y+i+r/7L6/4JVavKv/9c/ZEEoLNRC7NiU7W0AUlu8Rl

1XZ+4pmicYB7PcKnKcrjN+LM38UHniEMwcrYhh0hJd
Kiq0Fs9sETiApcUoyVKuwY9h1l6FfR4zc6E8Ys/C+gge7Ph3Sse0O+ZvpmZJaI1kASuU4zQnw58sBimn

52G5XGDdBnVYdxv+WB28kloWHDYCz+y4WCzt+rXdHaBP/o+AP/simGMyMnnYPmKJ9iufRv/tlgINlm0Z

kBvBQGqsW4v//6Iwdu2dJNae2S1VOz691PwC9/cO8x
zQkWPpEDksyh4GnZH9NgjqSEx3rNJOx6lvuQ480dYSPQvLuSjhrVzzGg5+WX6McE+9/LO6lWqTDJlgeR

ELhaw1pL/OOeRY8hKMD3+mtXDR6fCAwPvXaoYYVdlaodbEmf4wqb5OhWmOTi6huObRqQ+qZJ/i96Qlwp

8muTg5MYsDSz+9ORDQw8Lw35QRpsF1Bim1Fde2oNU4
1QbmKpAsoP2gjAtv/aGREeHB6MI3pjupOUrshJKPLKN1xz+VXWmkWDmrS0zV7CgBu6ZWXdxEYNw/Pmxa

nBb4twhA4CVKjbHuRVDVQIKL6WaHdwLXnLqkyazV+4Y6m2jmIBP6fj3303R97Tlloq4jDeTyQ/vdy1dK

iiRJudu81ePcZLEuXAXtFd0I4OZdq0OmwavzmztimD
JHN04gnFgPQq0Oxb/ZSkn2+5LxtSV91HPY1CMjzgObLvek5jYMkw1OAIuoPAhmV2yQU40kTxSYQ0udnX

ByHFCdvGWJy1DMD6ZfOkXP1CVyL0n/qtZs1K9tdT6zMhgceEdeR+ahk9ZRwc+yObo/oJIMByy0Uo0+Aw

FpOlotldxqjUdiZ3KL6lyo2S8FrL6VJdALR6Rkq5GL
rUs50fjQdx044SqDmqcx+VBWsF1t1mx6Nr7+YN6E5R2s3a3BueL08IMZcrhXVP4k5Lnmgs4k/WlFgCE2

L3ZRuugsA/9ii3brRc3tcnwrECmyDBcpYcvWRLyVBySxlZL2lsczTpooTHUiiUrOnSzSE/ysuj7+wPXn

A3onvPTVhweIlzMY/G7ANov73yrSzsu9kY0a7K3Cm7
cR/NHmwuUqEOC+M/mIiTuLWA6dV3YWen1boZwH24mya82yLItL95yiidQ2Ven9s7schIKZ6vwuF4r824

55FBtKfIC3vWZrtHlf2EPofZqM3WOZAVkS8nXPaRXw/xPN4lDnhtXYEM+X7FzuJ7+VDRFDqdGlpJi5JV

v73rY1MrBy7p2fa41Yc4GmBbJC2EkJt/iEUuP84lPx
NNCRaynFhnvYg21A7lHpF/YGJuWrg+zhZv8oewBdRXNJ/cBvYjXoPdTGbX4+6Xa6bVxnZiNXT7Y41gEF

zvxyBlDZiy4Xl+F1BIE90Tp4gebEsEUUeaAsJNeNnzZOWLnoPVqiI2Vpk1y/84WFtp8r/+6QP1+D3+C+

sTyJzEM0rMpekIthfwtjqvX3LxCdPq0V3LNGJm4vQX
IDdo7KkH+8YRiAENf3Jj5upJa7iIfUhbgZfHAT3hDqoy3fm6CixoOopWsHSxPLlcXSMBFNUdkrGOWheH

DRw1Tdk+sl8w5xGoqBjbqGS7ca/MzCWaJLrpbbulaPrBGgImFY0JPRWIAE1DZkZ50Jz2lcZAOEXhE83h

khMNHPK057bqGExn4WTE7TavSV6uYJWgwxwg9Hpo94
bu743YTAR+7UI6b5bKTRa0coqrvk1e6Uj+7nAIL7JVRtCnxagGkwzfwzDTCdYRMEcDpp0ytUwBzj3gym

DYPzaLqDIQBDvhCf7zk6U6a5jqqEl6Qb38xbonD7t1zK9WGrMMyigFVgsQJz0jOfdd6tA4rzZq72vM7F

WwbNEKu3sRtiLKaxYorMXQ93Z4WECsQgUKJg6HEy0e
a1nlSeL5WaIW0xgFCCzB0KnxxjM3Y1LLwjJxyR64pkHZAqpgH9IVmxBa9/26VzR4u5xlDF/gyasTpiAT

EWj1/bxsXEwMsNcaTTVWvMYH0fCDmCM6Fofhj/vzU8xVtA5GYVYepPXlFZsJ9+C+0Atei9ODFE+j/pEw

QSTjTgwYdzl+yyzEjFal7xjAar9aSorXqQ4I0n1wVz
OEH4zwC4Rm/wQYKpAS+ZmYowUL4yYtNPyU42u5RCGaswvHNYBXsLtrIhONngRCS98BL9lie6KYDwsING

317OUjxk8XDUuCAiphk+2pjYUPOotSgSyybnU5wD1NqAdeo/EO0jqIqWEVPfaQS1ZhgNRGkiBD84dZqi

1Yge9vCohkmEN+JwlapC2R70Gb7TpiNsSlVVOtWYYm
6Ro1mAMcXM9eEBwkM7n/qJZtAF0I26dGSzn4hcwQOXeAZ/33YCp7pVX+rAHOK02ugl34eWcIS1oyPBqm

pO6T4E4ZX2EttqnpxdwFheANSrA8om38wHvITx8E5ch0oyE7CE8pcb+lknONtSSNxAQnFppDNfweXXqp

O+42x2ZZaXbu2quRT7O2kTsJYBX5tm8nz2UCMr7Mdj
eqRVULyKMfhiv1zJ4+tuLzND6YIA3weUWEWHOwAMuFy4wVlJO4Z67jEgIb0wwu7RVk5z0OIeVFhlxxl+

wI+HXAYfQmfT2g79xfL5cp4UDuIPwBQvPa58RD3Z9MU2K6bC+KRLipb8mhN69OYA+nAa6OOVo6TLhgiX

Q2/5cLcbDwcVfmKgUuFiXdn+o3KAazsaBHFJswvgZl
bvh9zxuJ53ufchcjoNkFk9SpdfGGRlCGYbBnTkkfJ7lBg4Cng+viNl4IPKv0pBOuQ0yb4PQrQz7SVQsg

HVwLqTZqLgxG7DsL1YgTabHbTWRcqOUKy7jwq/yZevB5jnnMuegFTNgD+VF/uCUcPW/YybIciNCfbqiQ

SlbVWvbilM6GMMK/3sSRb4Nr8kfaW3YVA3tDffi2aA
1zWK+yePwjOt6k+AAeVyA6nI2b70Mr7FrEG07VU9tCrU4Ozl16RtWLxQWjpjpGKUZT3Fa2k4WEtVRnFY

gU9fbrAHA2Xc5Djs1D4K/DmyeEDr9GfcyC4aggfvXKlz0d2xVM/s8qKVnz53ldbp+2WoMNLNtZ/Sz8rT

1627eD95bChzfQ2UuhaN5Gpoxy4qj1ShF3u7+VQEd/
DgyzVtiS91dJQn8mgVaN9lDhuGAx8LFXZrsCKvXLvzjOE+pS/ZJzLsLoqj/kog8lhX5zGmXmULynZNOz

dEnsuwPC2myV/5shDPBjYkGtq0jckFaGmueeKAwQNQ5QHU6XWXt0HBmDLQ/c/u01UXc1/+ITUVeO9MPX

3bQ0gLm4avPi9PHnD/7VanD7gyxDNWExGECRgK/GAk
9koIcFMu1oOj3W30XPcuGp3d6WlPl991xLZAwGouwTaB3YnJmKd462hBusdM6cV9tjE7sqZkrxx6hQcL

5fK9KPxkydLzn1ZQN3bDF55fAPPJaY8pLZVCzJd70JchCk7DKU8G5D5xiBbaK0U6sqc17DLsrbM02FnM

nz61sS2IRP0pkg66NQUDd3TSlAoYktWUclPGO3N5d/
HK9ZywIL7jkRw+SKnP8PcfPZLgoSI1YRlI7tZLMD2eek7W75SDuLyFiTGxjrOOymFZ8Wb2YDF+n8x+xl

sijEpuuVqZzWWpYbrxy/wTygAv4j/WXdoQ5t54I47tiof9tWqKp62E1wuxoK0B1KrovL+u22lLHF1XEv

wIbcU0P76EkNjxyuCFWIUL+/B0F4qndXTwzVaU7lyN
TjKypm191tvNhMTSj9tmGJn2B6/UtBiVf1RM0/V3Sq0q9wCxPyUfhos0sHE2xXKdPCmBN0UQ8MQ+GgMb

E6bXUemuWTDYu3PqbtY6AlpCz0kneej5S/9GRCbtmzf9G87BARy29veexpIoR8p1KKtYgNZlAHsRGy6b

S8SEpd8Ox8GGitkn2LpZ8jOCNfAtlSaVaUBIPIER/C
zZdgiQFfzuPWoho3MTjZot2q9Nc00ixGl/XcpXlx8J04zHemBPkSgYS/D8S4xRW/+LcoFZFJTUTpFPX1

5CNvNXoHQz/1d0f/vZsqzDBPmsjeZFvRtlsc9p8E9aX4ASRf00Z/9mhkOT9JHx8DCDzmot9E7ZlNgmnx

mYq9d33/B3RZiuoaK9hfMLlCKqNd4KHoaXMZwgnkBg
i54CsCWucKM2z7QBeqgFwEgG1zeTWt+6T1z2kv/dE3dzPQugdtgc8Dt8imtC09UxUPpPSMilDhDT0Aqa

NHEKo5AtomNPUbn/hE6WQXV/aa0924dXnpcXKRZ6oXRaxrL9d1fRaZVgYfHbzi86hMtr9uN2taQCmOe8

JOER7e38NS1/y2NS/+h35rCt2Srs74DhK/JAnDdxpG
777K9Q3R2JuzJDFmdp0Qn49mM5DZT2El01+p0GGzKLYN80CGb19WiF0FatZzRD5+32erjlH9C9/dVt0l

x0FcFiTa987Ks/5sUxXa9yalSC4cdbcOPvkmhVyv5FUIgvWVd2XevbvsPK0rAT/rynYMhJ+MkipisImF

iyJ+9GHgLUVpWHJclI4aZTH1O4yddD0UuycGA8W8B/
THr1jW8gnYOJAKJ3rPPo/nTv8zSg1sbZ0zLdW1N0ScR5pDNGfKEuQN/N8uSChy/DX0JMSuh5olI8xH6F

jah2jxdmuNr13zSXZFLQfvBNcX74q3JpoW28hr8ipyk14caJsor26fU2d5D44azD8WEBH8HGdTDHU+Oa

Z7vtPMZ6nJDg5atppvW6rbwdu0es9oLuwOG2k9GpM3
3+VrdMq3io3ADNfsHtee9egW+l1vLJqmN6Qimi+SADS26Qo9HUbsWQLhvEVsPktDkE3mqnj3SNor6NIC

vWJyutLfvxGd82ZfSDGn+MVHRBBiHGFN9Z/VTdGzOoyybXc2StzMvP6aQA60yxQTyjVuza7xxnqtNR1n

EvhXWvRxybjpYl7V+2EL7aKLGsb4o6Qzd6a6DBVre5
XBy4sw6URmyBz4eyfrRe/D4zSov9dz5kCH4OxonC+D1bzrugSfFnRJ6ZG/ZWkZVCQiOAnxq7yBXrCozA

pU4LL+knecN6oQdhkpJoBq32U0g7AM4dsANgJBYNekNPKaEZtvUSy773n/EFkbbkq8T2PLRjYrx5p8Sp

m3Hq5HyNhlhPo39NOBXsbBLIy+6TYlqSxFhRyn5cjH
WuLyJd1s4wJwjQD6lBTRySGHAtmHYVq6tgVLi39KQP9aGg5KWzXcTEi1rsLxDyniGM3X/IQmJb42Qtmp

bDfRze2ZtGJYRP0ihlzPr3FAKJgW8IXINNA/VG9mM62XSLU0kcz+CB2w/DY9lHoX7ndggO5O5vkZjtlZ

gnZ0f69mb9YpZnOHbzdV+ZoNyKqXJBQJDKoi+6g4rK
loxhzp8aNhDnZabK2ZHFyVTW2X9M9ZRXB83l8NyaAbjCC7SCWr6LuJ8WhA4PQpMpo76xj246K+xx0uuf

XXTCAh97tJ4hQqScTod6C8Neya6qlo1m1Q9b7S1Y2gWCMYeoQ1elypullzh632YboMf6aau7BnIwptng

X0l8RyuPwaQm5eh1z96hr8vDSJWfa8KhmHMuFI0Ji9
X74QtwYK9dNd4TcHOQPqusUfIlm3EjC/GTnTdoZRawse/XdsoZgABKpiaQlSFk0wyT0kZbnkPAHdc4YB

zagbi3iaBstxPh6SuDde4EX3cJlbAD9TxfDD9iLIM6jfYfaPOQJ+xB22gK9TBh6WFTH9vG6G8C3zSXq1

ytuB5Jx0sqy4YXJap+Deg4E+04M+ECi0LYmC992qIT
Xfo+qUcesRjY4IcbcUVY5kgRYHL7Ky8Vo/Jlfl7D/CHg3y8cCrCD52773Q6JTTaqha6i32sabY+F3goG

HykzqXGUPLr3+/kyFDca2XL5WqxamtvLB5CkSX5/JSv4A4o14crkhfS9iEm+FyKKYOli27xWrT7E1pj+

cS4eVr7V0EChByeJLhpUkGZB2v5nr2Bnfvw09ftBmo
Xob9XpmfD7t1gW9xpGf1LUTSX4ZYFsXV/TCN5kRwnQ2DoaVP6AFgVnrYTZ1lClN6vWB28wKFNc+TejRk

UUngfj1+jAKo4m2aCDymqfTX1ZbsGO482SsY/i9o69FVcccO5qcumHfxi5LGG0PlUb8FQB4mM3lDUqFb

vCYvnbltu/iefMyMBVae5S6yhJBHu2b0vSk5hRzBYh
pXumcHWhAWrvr2s2msqfclLVpQlC2hWx4lWUranXV5vYrruN24ER1GtgfU+6Ff0VIhfb+RKTQf8R8dAz

GigZWAgOU9ZJ/K2JSChteTcu7Vu50PjCWhDfAja/AnE34rISTh4Wot33wOzY3yCjv9S1ebVjfKJ1J3oo

0pBMLt57pQp7wGdKkA58aQ6WCjQ5WwGLrTbeTYyT1y
NagK0N3e8CS1sYqszmD8XOz7b7v8so6/lO02KuH31hCBoNt6YvETvZS3p2i4XNgu4B56uOonmMs/0z5+

Ms1rcnm/ecjI5hOIm0cy7qkKF1dgNw8G6JxZ/K/PJDictOpwaNKKNyqKiuCMJteTjiri6G3CcTtsff6K

kqd80/6508krUI40QqLpMJNc+FQ9X+GavH782lXD3/
2NQ8E9CN9v1KObUG/nEaa9FbjJrYY0mfecw8XuZhsasH+pVwdOggXQdjozeirmAK5zDYb6J3sTi0iZnb

U+XVpoSUTRuuJBsX1fuWGV/3V8qKChJbUV2RLC65qtpazuCrSoud0sK7rZC+Kq45AmwIj9r3m5J8B+hZ

RMnqYZ1JY40756XHm0CI/qy13/iB+9xc4WdQNF2rv5
Ei/SK59pTWi+/uo9yACzkZBDxX5WRupU3+CqVNzX6e/231a5rHBOlMFNxzTwoCBLsc7ALh7HnTdb50vm

PQyEkIYpvcQcPvIdU3Fpv5ap3WJWOAcsS4zRTrKq/yPUrP41F7EJzHtWgItO5XzEX51AKtrmoQEdCNWL

SaV6pEu+6B03zEASIKX9MpMa2usFdtp55AJSOH8oPw
Ndx63A6GI3L8r/V/6Vefq8RW3Vr2n2TX2WXFLaYoVVmzm8ydu1Q2VQr4HTjet9zzaQse/Vx1c16GfDHT

hqMTf3159r15MPG6xTWdlaWf4/jErsZpjfzcMS0D1YSawzhpZ6C55PAtYpHWQyNcbnFdQEBJp5CiXKLW

x0xhYmJDLXjoVfJuw/IqMumy+pgU+itUFWou41Iizi
zRR1vR2XFMWt50tAEHY0rSFSaeSXSyyG9TvazjwC2QRXE0u1RKCEBebzERUClxQnvUKiOFIetd0pH0ef

blonmDuAM4TXtbLw4n3KtncHdzC99hVJwbmNj6a7lvb/HkukxK5RJuNPL4EZYebmAOqE0b4945LTX+b+

TfDEWo7OR1OBWJboVgqSCrTXCQvWv6+boxwEn9zaUJ
7dnk2pgJT2ws37BcaSbazzXJ8NCHlz0VB58PshDF3ubt92EhueTY2XkNMb/YyggAS09i+Q4SmDPL9q70

6RWcN2xbYuAAoC6Jt3GlJfqbNGHL9kPnjQatlLmtizbCI8t/14sZIPzQ1bhr9KgQuUV4AQnZay7Z2ICu

yNhrl7Iw+7fYfotQr3HZWiYDGmFeKe3ZXw2GdxcvYg
3WkcmritcPyqZIxP+hrAYILlCOeQ73e3ktZqtDGKk+h4jG34SpDPfbOxOvrx7o+npqf+bznEAy26B981

WTBnDNfoLQ9qM37fFHSJ5GFDQjHz3J7QaEx9gjudXXLLgUMY0bC1vog9K1YdNKehyVfM21CfDNyIAcmc

Npx2UwvU59cfhPEtRNg5gtYiEc2OrdZdx31yENhOjq
HSODhAAZqxlJDgYwxqixniYDYr1JmmxjrC9vKsfcWyT/j+ax/7JtaC7xbCuu3Y94eL07pEzz/LTXa/3Z

e4tV6o/7ph2c26HmhM6IuNaEC5CRmssvujnm4jnG+gMTq391OghZqeG/9rEP8xwLv6nqKqjTrIN0ffUB

06yfWPBi6oR+Btq7Qlqd/HXtm+vsZ8c7Gd1kn1ptwL
J31TKASuONAO6RAT6606uOO9ZldQ/6fnOSMv6hxfYJY2uTMt2Y4FBY6BpyLzp5GT93ku3jFMSn+mGcJm

G+P3XlpLbTeDAUd+7V3M9eJJWZC3qB/cmi8W1f2fC6ZaI1K7zYyvQd8ztgrnJl4cw3nvEGq2oARp9TZe

GQW9cDhq4wMuNoPL33cMZ4UcRzMKFJxRfV6W4s83HW
nJ0c8oHbQxWIjGmrM8FZQMflsm6Zix2c/LFPfj9+o2PfgnhaaWZ1MKQBJNZ70wyBQl3H6V1VQJcWWski

wegToafT+IbFU5u12tenujQ25a68b2RJGh04ygkW0+XvgIP6M9ehPK6vOavei0hB29+tTzgtN3R6FB1F

Gt7hi2D/ySBIdNSTtQasSYqN+Ycy06jFY6nSHzbqfE
GTrqmXyf85006SI7q9Pigf+1/h0o9QE4xAcNoqJZYrJYQrTcIE72RzDaIaq+7LqDPZbkVsgzPKz+f1hO

jR/Q1knyrxLUmTV3lpyPtK3jtIS9Js037dXfn6K1aLUpgF9UyJoK+Ptv7gJLgY5n491oW0eMOxhTYHPx

/svrqqLQ7rSF/Zwyyf9P8HQ+d83JH6VbxoK5eU114p
DlPBzfveXh7X6QjBaqu4aTq32/O/T0V3S8RGoGP7vtCyDKC3MUL6x/85YsdcawxsHKbOKu/75rM+RiJc

wgT9Hehbzv2mAdHR1OWGl2NCL2T6F1AyyudJOWr4t8v+lvs1uGT19A5Ix9lF64i9bUPM1SFvkNlWboBF

XM80FjUQKaq/2moriRqH4BBS5zoR5HUY69RrlTSksL
u5UUirFz3PKo8GVQoKIS83UC05698zSaGr9VmCVEcKyWSXcr2snzF/S4cg4rJGf1l+JtH0IfDrm1o2hb

My4LxJvEbKfttbCFx6bfi8DmQXBmQnIZjieKnQvX/OILw81xhuv6vOgXrWnkgyfz8EtTh4s8wMIdFsi6

C30M8ZM6m5KpsejQ75QF1VhDPdX/E1mKdaZY5Q//7l
oqJV6hWWXZoz85lbj8HekvR2JIhSUxY+CIM7II0yziGkjL+BEhyKuCIxYB9f986M3NF4Y6x/FCPxolyG

hDM8/DH8+RDqvuG4sbm3GK9qiZ//yLcd2fG4O8qfXBf1BjOb/k0gZ0TMsCI+koj8p75apjZvcuS3w9Jy

qrUBOlm53ybUVzZ5lfX3fGKrunXTJ8ZPRVdjSdG+30
CiTPHdy5YNubXfZ/fhspMe1nQVxLupP3bSSES7/zdM7G/ug2R8oYR639ueFlPDO1BGzZQUGL1EBjbDj/

P+DhgJdS2bEO7WdKc5f0B42AFg5zKehheJn/M8IC4cKGw39248k4XhKrOVms9Qgw9mOhHbyNu3l9sFr6

iksC2AeC4ZWBEHtFePD83C1j6l3FduN79m+G4MtSIY
2wY9AtjX9WesDBgjDfRU6gIORj6of/yMGpwC65ErFlHB8GSnc7ZGH2YzKBtH665i/1kQf9JJfN84MIwt

72tYlBRmxzot8Tq8pASS35zQQu+/TOxCNVmFQxnBDbtWAbkE7Sok89Pkf+j3gemjWrPFdTWAr4phlswr

1iTGBF3KP81mbN6cYMn1LZtgcc/VTYTtWWtAlZDy3x
tTpMmsCeIgFZu3Coh6tfOlwzQbyo1RW4sJgKA1RWpjhXKsDI+6Dk91kaXCdOWMt3wDy2QlvwWtwuox7h

OekO+NulWB4hord/kFYzZq50QDpzCNNOt7VQ3Yf6Bgl8NLf0V+lXQWh6aPFlosEhOKcNLoEHkZq0Zs86

eahF/iFvNTZfh/u5ZVzIHhp11QsWaOU4ZMC+SY/Kg6
S3vBL9bhf6a7VbnNn8+GFY+TtW27alLgmRxHCFOng1y1QeV8YKlUh+rw80erX/VRmk/cn2Q17Uc39c6N

8wQX2UcF3j5o+4oc8+q/24vCeMILbAZUUvXW+m2nlY47jKFZFaEdGkkvf/sqd1jUG/+B2S86TDk7c/AB

ovhAqRAtaNA8rU/EydxAuI1+8adu0EFG+CI1/7eHOv
7DG6REeHgh65OvtzCZ6wplcsYt21aoDaZzNk1iNGUPSiZDriIunFRy0Ivr8Ap1LMUN3IXdkMm3kdQhXG

4LEcGG7Xyt2C3f5Zt39f54Zk+A3Tc0r68oIlPbFbsV1czDr1csIs0cOTWq3wq5y9IxUpneQWdLm3E5+X

aM/j6Mjbt5EsApV3edjuoA2610f7vjQjFV95FGKG88
jz0tV4Z0w0yckh2X17IgmDQTxd0kPFt4sPK2x6kv6Oof4+5C9m6gX8un+vFxEP5bJaliFffc208NzH7Q

LkBzYxqNWnMz68ikHuV1vPEuQQL/87wZ257OBdxvgK7xCyEckRnvx8ODmc9/BgLhMBUgxlRMsEM6FNJy

VJ3aoW/PaO8T59cd78DQ72GM85cSRLkO1xG9D9tZq8
+sz+ejICTjMbLXWOja5gTuzH5lOFOl0DHa12J2MDaFcKhJ2U4hVCaIdI85mdDBRhep5IymV+Pw5B18hq

MeNwauRkAJacqPSwy2menXG2OgswfjPdY41/YmSQgAjwwVmqHOQTu1MmefQCMlTFFhfEYcoVFNvcy/AL

dAklm/fEqWgWJYJ38B9p+b0ueZ7BKf3GH2eliV2TrU
UGbb0lzrWPtyez+9BIDlyaP2IsUKYugTtcfKPwl8o55U3UvreW9qUKs9Y8idD2m6fQUhkipB7LVhiByY

nf0iag67IcwWWDVEKOmEUOP6Bz9m2VjF6e+c7lXFPEgH09/NPknPGDR/FkfVy1PYsfx/mZ6qUSqSwl6c

njPLhj1i3HmJbCWJqQDwhRj7Tbbg93FtQ5IAywafYU
hfeWH2Kf+SVs5NDJhqRb54N8irlbMRYdRABrNakCz7LE5bYSN4LlPKdq4V7qqJZRuLujX4fvMS55pelZ

r3ovJizRfpyMRpmcp5FKNCohBA5Ez/M6AECPE0zdpFS0CYDCOB5nATp7Rjf/SVr7x+6ufI4AqjFCWrzJ

Fe+DDJy5WLn2wfpO9ataJ4PlZrGMn2etKjr2b4Aw8A
xeCduUbTxEAG3JBSrZVZdDZv/Mh4j55XO2r0+dE6FsDy+/LpxRxrW16T8fccVx01tcfVxCbKrnxeTtCv

nwvtciY49MzXPprkh74yPAIrkSIsiLPgY6B6RAd4D1ObpmLPLys2sVk7Ng6mnZ0c4U5S75kfNr4Z19pW

CzhDO4Eunjwu5bILkX5qzKA2OClaUh0Wk2xWwF+a9Z
8sgUYflURDEG1AwJojeR5s6d2pDiHW9oUtNU9kjqCcw1z8ladLK/Eabk84NnlH8/eKpNXIFDV2jrHVnA

FgWqIb8TFXR6BA7N5QQgKZvWrxjJf5l36XAvR8E5Bzjg0OrbCzmtKv+09TkzQllfgkngAwYu0LhAHbxI

IC+mnp1pL8uSkOxsGn6Ou+b2JJQL5CveAZcAl+xvMq
m7RTcRUWgmEjH2q7cfuEHpUGNmGZ7wwh/dri1gaQXlKFtSl/KMmUFOU0bCtpI9xUm5vKD2U4hP9wmaTw

iTK9a1k3e8sxFuQc97mpKAukPtmJ3rPMWRjJHMSSF+jwe/OAz1kiB3i0lI1J9mtqUm04CdsV6G53aS3D

PSPsXyf1rFsx7V3WRlzrAcpwWaXJgwv111yHGJj0eC
q2bL1vpXldcxmmJjDEfFDfHqOeQT3r5o9xi4BOZefDjbbB0Y1WMb3lyKO6+XeHgbrnYhd64Cv285J4Gi

5G9veqRSZmEguROJQWS3dSOYwd0oCzwsy7QpYzyTNq29oyS5Sk+hDyC38IFushRAmk2c32IOQot9nX0A

9h7UQs5aarkCJUmYKsd9kP2rInJLk/FWJJf5ZVUbhU
ZhpLYTvsb+AnomdvUAEviht7zqbkBAJHQiXB5KegzcUe+4fgtXEFaG+Mp+AMP/trhqHxm0KUQ9PzDASj

lNXDbtEP4g7Tmabz32kmglbSOvH7gXvMlo+jZt6+Hp1pebF5iQBkAXPjeDXtv80OLHdmdMbZ0jqn3xWM

AVN0O4DPhmYCmNhc8RySxL+H7MkEur5X/qOq2ocKU2
JoWTE/JhFWM44fLuoYb7f6Nuu/V0hgAqWaXQRZkWf2Uqcc7W0bS+UEEIgmMCd6grFkUhy682oqsrcq3B

IIDAr7vIU60yBfTOAlJfmnOOA0i26kq1KbifqJ6iUIRCteKPY34GvGnY9c8IzDws9IH0ewHkBjd85BM3

wYIOXP4P7owNbmA5Pjo//WR7e0RrKXviGee0Tu3Q6h
FYAm4teaP5+cHdiR85JufhTxcmxKYB84+9rWmpcHmt3o9WyC5tf6+CDDm+3wbZRYjZDw40uoMsrWDn3Y

PyG5qM2U1Ww/H0Fn8+xrygHWjYr6c/5W5CHHCbVNils2MxM6cMKudMeJprwwUE49fzwELoz9UoQyycZW

fY3ZiTMyxpQH0hDEWETSJJ1NwH1ALUBq+d0MXxrkd/
J/BCQOHXgQ7GnhIo4Ig/3EiniMUT0YhN6x4p05qQA6y7pCxzLzGt54kD9J2Qwbd/fcmYpBgqyJyTntSh

iQNzSGRnj8lbZg1ZH8FcAAU+eqEmNr+SYX0oH9qjkTLty/uuGoZpmu6O9yxhCmJFTkiWsdKDPw6b6Gf9

v9Ac+QYD9iKIvnOEiOcGwKAFQKbeh4sBlwRYR7opty
lijjbcsWQlGrsGLc487v9j8ZIc8i3tyriDtZyShKlSI8E4GoIFuPOpMseSqXjC7wu4w7a4up0iPQmckm

P03zcvvhuzoXZVxbnqxNO86YYqZb7b0X6GvTdtkKRrms/MLrIFqky/rI2aD7cgkNTfehFfJ9SsnJWZCW

2H7NeX6eM2yvNxyeoNCiRMgq1D0jtZN6VsJrrE74fV
Lvf7skfKL5pdB97kogFFG0S+xi+rXrIKcPnEP68mWSqWANCkcwwaEV7kDuMH/eEaJrF8mUlfAY10rRdm

wU5lBxmOg0rhueBXhP26FwauH9H2xuO8UxwbFEQme3UNW732bV9dO88eQ8DnBFlRuwk223z4PzvaL+ZU

pDSE+4FKimInIwO39Qbbxc+Qir2a5qgnMIdVIXf7D1
ACJMLoNwMG3DZNLqNQ1l/KOmHwlCLxfFFKzTea3TMjkX5v4N7jgy+tfPPDYsYqRvNOL9DgZBsiSLLjKG

4YzNbV1pYkKWZ27mpJE1EOtUm4e34yk3TEkU9wTqyChB8IHVOlfOqcXCIGP7MgTBSlPTSJDo3X9fhOmh

MntwH3z5zexKFwCHrtQvYR7Sucls+kJG0VyyEKghVK
eTT3IeCbzRZ02+JVT+QOEg4hAMOczNd+hBN+dW8XLGT6FgS5u8O9C+8n8sc85JGYGKetiN3Qats0xb9X

o4XXclkDuk1nCftqJKB506sNWt8O/JTGeP0FBu1a9r3Nsj4bE4Z6Q3rlD9pKumuXg97tpihF4HVD0fHK

A+xxz3dgi8tYxmGTgAEmkFp4+c7rdlqFeq5VvJz/II
SYPu65yfBBTw9zNQJn6q2uQ/o52aNpkrTX2FTZRkuuYl0ikplVMIQ8dRjE8luh3E4d3A7AYL9GomFaBl

bxiJQejYOHolM1xI/3QI44+P7+wcGDgt/caRLxGQx7hdVPE2W9ZKTyArRm/8kQdPqa054x6y5EBXM1WI

m14Y9ctwlJarW6NSzJz/wyreBj3HZwPCikhPlaEkpv
xTVFsdQepYovKs1DJgrnXFLe1gyv2JrG87vr+jv9UQMzGej++qdZJMtAmfLiT1PliSriqr+4fCpy7X/z

Lqh1Ua6EEm36x0vzc8RULcsWF+7Ipbl1aBKfbQfxKmQJX/aHepEQ7dGJZWzgK8hqS3tQMJ1lMkrTZxft

Wd7w6F3F6wU71htizpaeY+9IWDvhCf909RPpNp75h4
UzE+OfI8nf1z7opJUQZpz2PDM1L4DRpkb4l9GRcMmjPp69iwIWgPodS/hSbBCCGyPGkqfSuabLhslLhu

Yh32iHX8Y1WliNxF5kNQjuMOnqXkEfxG9EWWyg/iPMloILrsQJrO77lzeeKqFz9bxuSGKmOayOoAmMmE

EQOvGm7Ru8fQwwhFK+fh/ODKydHs562hyRR146APSH
pQdj27MX94LxJwDmyeJrUmd2ClxEdvPQEf4kqqcjYyXb/VwapQq5gH8zlu1LNZ5nQJCHtSJXjxg2mKhF

APyMJeucqazlTlrx+wV9NLG4PXxHH9mjYxh7h+Ku9xB1YhbJd6a+jREPHG1hgrQsoXZjONp6s/B8SDo+

XekovMTLeKz+wvyMTzeTOeVbffeA9N+wDTr912OYER
7otkfHEyS4uXezP0KIbAaIduHe7BtBKKfK1iXbFb1sbyKq1AkCARrvZO4tL+DvRr9jc/WVzE9Fe+pciq

1MtJeZ+tlIqyU20Dvfx6tl6nFJNoFqcpC/WyNBAmf+wTwmGnMeOsTcbhoPD26pjzAE+9gKjOvOIlGcZz

xTZstzL6N7PmWg0iys+7LWHbIBXqxOGDWYDE0lT4G/
xOmh0PNNtTpJiBd7Sei28PGdFNzAKuRfuEQvkKScqNzpu+NyRfTI+MduS4mUZAFNXY4DkZexMHkJCs2u

RnnFuD3o3veh7bCyvkh4oXN7AZ4faDj4EkbuXzho85zkEG+LerVvLm3w1Sh+M2egeRMBnZuDT/o+JPJF

viBj8ydSLDpPa5I00kfsUoA69widuT8TS/1v2xzPYV
bj8rQxuBzLvr8FsLgyBxV0k6icxkS/esphWuQ30y7eofl8bA0QFvcWttbxIIsd/qzOfnF/9xHnfibes5

n/w5a5p/bXkGU7Udgf3dY39D+bingp78OFUfLams1iw9un7kjjU/4SrA7AwTXlV2Cmi8BlCrgoerGA7r

zSBv8GbRxFBmTfw0XhzHFqUimMEe8R97NeC9LM6yEm
RYnVnFPPwCx41izzWMWSLxmDaEpCY/HSQpyFSAbffXcVGV8oJgw/2VuB/3lmtdFTE5xOPj7zblA0ihiP

+nJLQ0NxJuTvYjwVoMLKsyvFPOuxHpOA2RfTqmYgibMAbO97+sevfMawR7cZGrcTAf+7RoO+DeGRnfJq

0eMrrMrRWpn6POO+tQaET2bBhytyMCQoXeXyAxOgLV
Gc2GrlZ0ADftZ6qhQhM//66bCEDiJVe8nwdcXd5pJKzammjZiubefIDUnOLjXhyw0j+ezuCv7NTNva+f

qa/nx/jDe0SbLCQu3ESCbYNEvYnFwyXbopfoqRWtwcovVaKUYlQooMbf6tkKAl3iclxeyO0wjtr5cP6i

oDuxS6IZcEVJMZAS3e7U8E5pnmEnLH59fXqiB0vy5B
BFuYQDAPGrukPh08He9/hZqyK4pP7U+bStUOmp1pVLinNtDxG6y7QzHe/yhEd7kE8YmjheWagOno7T2V

nRiI4XGVSBLUd4Bk8QN2zuvXussdavk3gZ+aheycsAVJHYHilCsTr865j1TS/wwG0S3TtN3+xMMccqbj

zv9vWG/WLxMaL1c8f8WL+9VfGoKXVtDU/vPaxbmehN
q+ZkxhT7uNyI1/YBP/+USjonkGS2oXNQ/0vCo2NRd5M3beGHWfdldR0WcIJ0krLZKeQhdzvfErOzY0ka

wemNgJoDiZK+lb9Jki2tiPFiUhjg9+9D9KuEDZMZO6yfVA8DUuL7IghWINzALHB0fJlcoGgmnMTpYwDl

9GRz14gVIQABtfVT7k6uOBZYoElFtX89E2mtvW5pw7
Hd6pjQRCmRBueM0woztb6m+fFxqh96C9SeIS9D6jfTwpgT8A6hjaclbMX4MxWxBjeDdAZk5n3sxUirJ2

9K5GPnwM9H3khka4J7JIr34dZ1ajTAO/m2VotxFtgMmFGm/SO66p8/8tmJsYT51FPhnyo9nqQT6kdLaP

ybTH0tHpH9aGDG55JMDpmxAwt0TFUg0YX+KRglbbCr
39M4Z7oxrnbIkOtdzQ66gu9eROJcGstm6t9koLK76PKqzPlK9kn58rwvC8vM6G2i3s9hftPtqd3zuXtg

0b0rvxHySIEcdY5lU1ppa2u9Vv3H36iw38goxT8G11o+uUT9gUvDz1hvpFV79RVfWLweWOPQHMXru1JN

E0w/CHpWsaU+g9b92HuWvB/seVS4r6iBkoSMY7LHV6
biYf4/B+O7wRmEs+H/PTxZ9ImPPvA0WhY4DzpqgHZW86kTdbcEbRvYEi0lWHcJUJ+suK99+uX/5Q1e7X

+jSBTDo4tBQHy8iEKe5Z0UbB5dtGTs1AFdgqmRbMCuHKQAeFUMo8uwXISO8rmPy2GTWQG/6J3fke3iUn

+aBPdRdLLnIhecrf2OCmC4MB3pBqS2iWtO9ryKwgZr
+HpAq2cfb9r+L/zdcAr/6+3zGijO5E+24Hsyj6Q3PKhCVHF7ozob55HGlR5Boynvxa0OXSFE7wT4qwvj

OCk63qTLURouufF67N6wBhUVHQkrgPWJcSvNB/FOOXPrvzgMw0XBPk+PH4vFBXVDyel8aPfR5DmLkOJU

yVYNk0HtMY15AG4j8RGJ5hEaHG3VWSo86LB5n9OPpO
pxu4q4x4W8iXNJaGTdVLhHL0MwNWqVRd0Ho5fj+v4yWsg/xEKOh94OrNJqxRZU0swwaG2scG1Jc3tz66

B9sFlLWtU9Rt+9AROuIv1BDUuh6Zr/2SQJt754N125PEtYQgLD8ianYAyZ+2XSPMhNWkjUc8GtWKZ7MC

BeGOhhohDTb0DkpkRl6VKufAkxVfKKyJneBRgUsDLc
WGbcTdkcCoFq2NjHF1BydSHxinO//qFCJ6+ov511/5i4es93KmR6zOiMiDZzJWAKH8z95tk8qZr4O0Of

lQsku4YEIa2dwQGVdUIOhdzpAgUSKyV2LBQkdM+gQaLTl2IkM/8lpJ24hrUnRl9kuDGs714uNF2GzvHS

G7+Mu7tzQL4iZVjMf9Z/dgGSh5UVT6aEuZWOhkheF7
DlVDJfwj8kRnd4EhOjovgPvvF3xq//niqCWniCWt1xKXDAf+pb5LbZ2YD7gvGs7P0hXqZHzQxoRgcjdr

TlY87juSnP2iJz/+jhaRbJf/moG1sDiEI5SBNCCv1nk8v9/5kORGtYVm+0duJX/IvPAoedOhQutmMDCX

3VKrvbHc+z74HpAxwoVD0WE8sYG0v/jKBpyrHV0p79
fZIjWFaaprb7Rbd8t9F1Z8uN1ybp1aBv772JTluAFX4G67rLFXIanfJTH0ZCD82KCSplw9q8g2HMfxmv

uHgwgqGp8APP+l6g0H+phWBD3HNmCv/9SU/ABGEwTRCcshcN1nx/Cp0mrpJwD6INV8GVx20YrNww/vbG

ySicL/hflny++LtUQFzI8BEvGvGIInsiyVHj2hCQvq
PBc++AWksB/x/QqL+hJl7iWPrIAgIPF4hjHW8Pmyy/cqOTkle73/+Gt/s2BKZjpilBFMD9WhPuHWJ0ll

IxI3WSzWn+QM17Ye9Nfejr3jXL/n6tWwBG4ZHUBgc1qbHL//l9h62gK//RM7qYdVasGqYGfY/+nNoXA7

eoMxVtQdRgcCTFZROhrtyZx/331xGCTRN6ZJ9k8zSw
ET0t6Mz5c0g7gaXBb1s/xKutr91RJZT3xjlBlMmbKWMGPws9NfFtv9gSx6V4DnYuCGlUU2I3I5N4ozUB

yiwEe7znNvrAFnG7ibbdzW1ioBRumR1LbEobOruHMVWaDCdwg6rMy99FrSgzaUPkcdO6OXhzS6D9oWr+

4uZOOHwoXAp7okAVkM5nUjHmRKjDyoEzUWhhsWba51
gp9wbhc4rSuFZu25yMLzXLNV4RqAUtDqnnFcN+VbtF0KLytwWqqglh5EveTFhYb1W2dwO4TTfRDnWax6

eVeFEEZY9SFTfiHTgmusz+F9aqyDo8smW2v8cLAbE79/6TIvvO8Bv+BWv+Rzpq4a4/1xwbBSDjGDBEBX

ysAWGI44iPucakSmQvLCZqLOg4BoOuC52c997HInGj
aMfOGMIcfFhuClybGkiD34z/ohfR6kntW5/+NGXa30tiqW71X1qGuLScNcFpulv3RfohkFMkn0qKY26u

QMQB9rEByeMZ7b2i3BFC+05wqpEXwFeAAGHXTsJAAsUKACWaro7DuRDPDTvLtzIhA7RuGa1q0AD3MmCY

ZzOgqQcO8mxxVUiUorMkRUd5kxDv/YUgyPHUb5y0Z7
+uABqb+jBVGCb9FkU0DtfJtnHsDBm4XtbTvEjBLEKPsFND2b/uPAdgyMqJ0dV9Y0m2r9S0avvujhwX3+

GnSMOdbb3YYzIW0VWpfRL6Mf4BBIZAO6VJa7OavaZ4SM0JlSLDgvZO/323dt/1oPeim4Vyv5xfw2ua65

urT5/n+F1dB08huj8f4FDt4RH/OZRtOLtJ5dEfEhGG
2xr5C/i3OR7YXc4j/iTkF2RuC+zjL4Kz5FyS8ifnlC7bm3i+Wq6XMyMVe035r9gckwXnv5Kqg+tgrVUR

31VT0k+QoTk2sTQoxR4NWGdBfNHNGPzu2staOwvNqVQf4/QZ7NIJ/3sjLqnSHdfcLIiESByrjTKF4qdo

Zu59oSR0jSEeUQa0XATER8Ocl85j+aF7wj//rdcRQy
szl51HuX3Ior6oPfsUkeEEavPIybnfm1Mv0YBiKExQgU978iYbtcCHS6/t+z2P+2ybcTS4FlwMlVc8sZ

wIQKvObwTRg+AkEtAtscfhYIrYGwjNSrK9Sv7F6OCquz06fUS4BTa3THUuFsMtL4hRZXHO/0uyvsj09E

/5wDVZ3bKEHgvBaAqyE64uaGxkA30w3TfqQT3tQ5SF
yLJ8QRpl4YFkTFMQKiHHivaikP6aCWSypZ6CFA6XFolngx40Ew95YEI+TWyLh051FcuGIU7QEK6LXWhG

7sTgQyVk+Q2dz2trf76smT2TJ6mTZkIy3YrosrAb4XyBP/UcdM37dY6yUOUgVus3UxWobvj6MopinYxD

FAvbpQxYW6kJVPgOf9+7kSKK3rf7mcMBvuWuYGpZKN
strCQnOcDbRR940vJwatYFz5/l3MXFN0wv0XrQZUyht2o0MtfiiCPu+OodzRdzJX7BMHatwsV7fR6q26

Myp8FHrNSlfgqJxaMfQlun0Cr48GUe+gEaO/aPBCYM3S6AikmOKttrlvJP0xvQPD3+K3jhhgtYj02+IP

dySKbhoffhINZk2CyT+uMEad5VgALGIQ+QxLabmQu+
GGPGuMvBKWHxqhIeEGudqTpD/4qPmNYr4WOx5yTt1kla9QtNPTVYebVAPCphFxZ2BSr0GIWhr9HOolUe

bvwqB0mo5qCu14/zpHUCL1n/23mN79HZX/806NNt6YjwH7mCGbM2ZuFSsR/o4/f2kB6QpcVBWhf3elYk

kWF/Op0KSbOJvinlKim3LsFWq23/JlGGF0yvKcerxZ
Ch0Zqrzv9H3sPf4wRQXR3vm+vayqdQk117YpWVuo8I7jTtqcTGUc4m3Mg28pDr2qX9cGH5RE6F/dZcVb

iHqz+tIu0EiP1VO0C1mnxE5uGxhu9lwbXg/4qTcKBClnqg1dvoV1udkZZbG3nJiR05/oslhSHWnKwTM5

ZJjlHKlBpsGzt2qtc3nkmMUE36/ylHPslEIVtGGF/W
3twGyPFTqn7sn/5a3oAq9nRfgPPZE2XHP5xyP/35qot/I87rXRZpist8sGPz7vhdgY/qw8B9GHcz/vLq

Is3iQrYVTtmMpPrz6y1Qe0VeglitXikMHITbyFSYjBNQvE0Re/xA+W8o4dq8o6Qx/zO84BVbF55bq7kR

prDgTKXbk5aF44YbDGrYukpxDBDYdFLUjU69BPCZAe
7cx8S8RWscE7W/h9zLNZ+wqIWPap4n8RYVRcgu52aztBRGtLQcr81Fqj20TonxfsCA43hB5yN2gNQuLl

5SJk/4+8IaJ+QtWQeqfy7wKOcHgMwpTGWFw7yDLpCUr4cFjNvfun7UMgImQDLiCYyIC536/nKbwB/JpX

tA1m1BB91OLy8E4euoLl+cj0EoeTAcb6PmEOJj4OGq
cfzLm6J4+Ne8yeIfnghTS0HT1nlu6pJA8+LKsXXU8a6RZZoOSFwcd9rbdneSsaGCyWm31/Xn9lknpy6O

mbjqDEsddYYTWdlp8FZn/317XJksG1/zb0FiTdsHnCZoW8ODPNFJVgW6ccVRymymKoo9yLh/X4rgLHVm

WrajiQvjEBZ9s3FA/Ba0ImMUkWnNYa//qItagyzmFT
tQN2ITpbL0PB6WFIE2gf8xU2+3anUx40/wicGx8KkYj/Na1eVeYd3q6m3m2OwWqBzvcPjJUzHMzYXbl3

mEN8tU2tdophRdcEJGpEMTdSWHWxQsYbocyVZs3nk2WmcoPQdBVpeoPPHNCtjHZ41ANqDiJKG9PXl/gn

cRkJsZ/P0569jEVYj5XfTZ48Wcdgvs4oNM2rDDI0Hx
mxlPr4PZyjZ6dMz+9OcSMFsbchHjJUdisgeJ3/7QQgjMe5viNv8d1y4TdE2Ba5LhLB8GEOBjsu/Sj2da

Pr9dBXU3i79L0FIA5hNe48LFtJkZrIzLvD2PMBE5r1Ty5lXtnJD9zQT5UKopjXhxRG+cj84snpO3cEGp

ldt9HUorO51K9+ROGhmkMgSBNQV9QscAeLcSuhDied
IJGl/9M0pxN4NYWAIj8Zt/K50OxzYue+8Qu/D37CL62ZD3S4KajAl2WIHOVo8+R+zw3jiIYF9fuAAXlm

xvXrWRCsW75jr0qwgy/aS9z/kMPSRoX78BEcpnnulSbPEKrWvc3G96zij3GF3Vwl1beFtrTbaPGPZQ5H

FlfPmg1sEqR1QBQAwSxxfpkD4TJu7Av4hU014ycjoX
szBtd07a5jaIIjccQRkSGc9mrPy0QUB/R6ostHGGgvO4e7aIL2dQTWkNP+M5gLGrq6c+SvJPAQD2oJEz

Fe+ic08ouW8JNE9BejzeyB7nJtSOQZtSG7kmBf70+Vm4Etc1CDELNp1rUb2RuPyAO1028KIQx+q283z1

KSOFq7+wpb1G2qNlNziGWjBJjim7nxrofgjUVES+0T
ySGAIJJBa5XHdGrVj8jOjrJLqWdDf5Zi7AJQlJXwEbTom8qSoT87WP3rqrsiPMFum3UbC36ko+dIQPHR

Bz84AKyEkbA8dzVhAGvZCu7TIBkgP/5+Exmzq9kc0DbIEeVvvol/aBEe5e73QU+UERm6dhHbaX3Mki8q

6Y94lK0MDubwkw8Wmz5hWA6170RzLQRa2Rg0Y2uDhu
U9IwR1uT60LHi+QcUJVfvLOAn4yzjknjzrL70Dz1WBmWQSLX9e8xfqyrQUD4lrNc+j7s0EKfUilP8AzG

JtbK0IUsjT2WW9fLOBKO1LeralzYtBUMbEXy1Qox/prRIRDp8yzutAOtlyhgqPRm/aBF7QtNr9hybH2S

uzpTZZiUV+3Bl/ZNMHfIw31vTJDQZ+7RkKv9nt7oh3
Ib7iybYRTY7U/6/0IO3I/NpCR/CrXg/hJUP+VeaEKov0aoa3bdis7hESN052nuYN7xj2+SvGBf6TapWJ

yjiRKnrJ2uO4gUQT1BxP/uEMQeY6/iJT61KQpxxRxeUX8LNDuHx6ZLWRIomnOHBuTijcyuU8QIvy8cD/

0X5eLy6QvM7p897h1UtOZFLlVIXtjprH9i2Ea+ni5G
KFSgTCTjDZvXIVmZLaj76ua3G8t30R9JRlzf/1GY1almUiF/4YwBQMTC67loWJfjsfEE/yiWAj/EJcze

PALMvmcWFoKh/ipCgoE3hz3zPiNqRoI485bn/zgnAR9wu1qrcVm8BwGgSH4J3QsJqnuHtuYqvAaXPioc

sRg3gdpwP+9HXKx53GnkCqdSKhcOToaJ5e4tJEmf+8
3p7Q4Vw8ZX2RAi00y83dmUt3pHTnQZdbrIx1Phme3NCgOsoRXe1b0uxltUPZkUDWL042P1fn8t1KmYoD

LCVLkNyNWWpNDdU/qm5QFGiCwnd2L+DGEIbL1LbDEOwvWHwOrwitwzkpFvVozKe4nlnLC8WcJPzHBFVT

c69+HYltcvWjPvfMvT2UoRmJMCnhNg43uvL3cZ9AxB
IvkCwxR0A5RVIDLasmDZeRbP7LDiVjaiwDE0SqnvI2G309Rb/uRwAE13HTSoV1GXih2mt+O4Cg9YeViT

EPVeWBPwdDTwJsb61RtW++cVxPSlh9ciD0ZagxRFUDbUlC7CAOq+kdlzVU+Cb+hnnFhhzOKq8w8iAtS3

qyntDGRfTDZgzLLQtOs/uQb3oQ90GstNAlMzHRyqDf
xPf45wBUT2cSb83CnSNAQzztLk25cvs2WaniWtxpQKONhkm7zkrV5r8v7ZsFLGrxLmQ/cae73eDXvjso

I7YLRljeALet0BLyzHElJk2eFetL0sv3SUwqWHwxHzG4N/e9dg5hIb+0IWrYnsZNU5avhJUUHKP16Ak5

WDp8jA+SqEanuzc5/SocXPqu66h27rLmG0EcRi8OYR
eOdQuxZq/W9O7R/h+vkjWzQz3kz4kZJdhpENhWqa/N6A8kCflxPXIM9TR4rmjVHRCXCwx7+YuuJActjJ

IoY4Ovk0D1Nrclswgw1aFFtgEleaP+5Tf6gjdu/oq3yJd40VlXgfTvRzN3QtxwPQL/E2NVb09EnllScf

cckw5Q6rR0f0EXwF8dQ0iEgsCzbzMvblaX446NozAR
+xzgWSd4Vvcuiyk6QS/erzb74iOX3esbI4cD5G4p70L40qs/mLalSxVdl4vPXC35fR+5lr+Uj+7bXJAz

xWiNorYvbyxTRy0ZAbL5CgWaSO2cBqQ26pmW33ajoLN4VqRXjWV7Ee40AsJCugWayWHbcMEdvs4poF94

ftAcaxX4eS9IQvkaDRH++Kzu5/EStlZi/lFyvVonC2
DLiGxv47IFh/oFucb3JRHt2YzR7gsWBUFTPNNPxumZ/rsjqIIbqTsCd+LGE4WxrYMJyrMU71O2qlOeOq

0Pfz4RoP3+sw30WpWCJvD2Cnirtt5ENsouVQzaRSoWx1PwefrcLYv28u+MfRvmHDGG1qLwW9PNqpJ9vZ

uzW+Yl+I+EkgfH0CXbuaap1dFVEz/0Yy9fktCEtQoD
ER0J+v/O3320Wa+IVf7yxOfqr5KXrawShhXNaCk+YCQBWM1XHtFI1ZU2mmomLadXukVsxshkxWs0LbR+

p9Z0qBdIZiWqJgLw8JU36ZKqF/8ngGCFatUw0dengjGrNvrpa9HgFxSdqZpWDJDCZ0OazSiHGxijAJtY

1wd1dpgwElzWtMxpwe1WPpAHbPWmC8aT+gYxtu+QJ7
+ITZXZhQvgbdhhCyg8GeQ/DlDPedSdH+4RZ4hYIo6i2fG6Sk2HdqxC4eTTSVFLmPj0Xo2Gd1nuUHGz7l

F60l0pKOdenhoUsdDShTy3b0YGm3niWjMkj0BZJttAIS8VXtfVFxoYS1058BZVppuZuuO0K3NNHBjmI3

GM12ebiCc/5bVhCNhGzEigr2naT1rqtamXFAMlOstG
wHjD2/IupXfisAHRqTl6xJgr+8bBk+9NMMiefsQYGvv8PY2qWp5fPGHWzQfImdVtUFgs73/6fdipPhOU

F+HFnSgOCMnSk7ak/y/CLg7wFx16nx55is2ibqs4/njggCDpLl6SsRZN8Tm4dB/am83kzJrlNO0V9HmK

u1296K61Jff+695q9o8hpVJ/K3sA0e8imcGzvZbY7W
FclWhSz4fhgCXpVMRFVsMv60R7FlbqEtKh2SuKHV93itV35Nno3lWEq+H3L9KZVvDwK/F7a49aswb1Ha

eeeFMkntMk3tfLxlXJlqS0boj23hy3RJ0ywem3qndJNaiuCXZvL7gJVi6iamdvh06CpMrCaO2Hvt5rQn

BjBllpcjprEPSHK8UU/VfTshMlRKFt4sZWvuuXKeS+
SLdkjLytNsYBcoI5QIIJRSG1dHNjCQylEO3IBYqcwrSU8ar6rwpEdPNWYRfh499sWedOMWKACXnwOkBT

nZv1MdFwMZHkzzzVhaHkQOBzb1rtQelfVXopHz9gBCXvQvsiiLueOhrDvaesXHQuSym2QB7zvPNEC+hs

XWWBOb8CiG6FIkXIyqcsFv0P31KvxYi+1/sBdoKnWm
IUZTF9TPZkkjPsmSG5lQKD5YfZDPdNeaLvyeeHMUcH3PIaqX289amlwux+x9We/P2WIPTskF69ZbWM7c

LRibRk5EMv2h0LkDYmCHxNlpDM/MMNVYCvEl4KHGveRLEuS6tb8JqLxM21mclrUZ/x3/xviRJlbIcHkc

s71pWcwM4b8ty5+5AOCsSOH45Rsy1fJo0qGxfDRKXl
gllyMgivWJAvoRMxmopecaidgwlfYUgBhK2nFk/5SIY4QYhqv+2Sx3NVu7XxQ66K3Hy03YtYtt6PaUpc

QmN/eVJbQpvb6piDhoRaNiTQTFXePczHUt1wVb2oC2vPjl39i3jJiwhIJCmGcGpLcokqOc8YLLaCuVnu

I27MKdxGbybKXNgXUu/u+lxeAdotOwBzsa+XC8GCWw
ebgybItQFY0f7JrckgDWkSgVb3tZbwZeJZS8jRrl3eKlhlb3T2ghsCoR8Xg0DT5PZCln56RQ5e9QpEW+

WWC4KpXttoe3bQCq+oHOD/sxqaNCs1A5XeJAUGoQhgHhaSepjzBkp0DRWoQeSxIpb09pqlHNFA+AuHwz

IJB3B8CsX0gWkv7V0FUBpIvC+hLAVTTn4h5/b/tA1k
K6QQUkayulH+CRsF9l6A8gK/fz0mdErbML7AMwPowoS4Ic2qDP2a3KRJix7NTQGgXGLt8mcfxCMeqtqu

UuHnLHPn17Yv6G5VZypCTqvYfSxqELc5isTGP91Mzu0PHzJ9D8/WR7sXg9mDctk+3CM6khQK1lA+LRpG

AgGddb3xk+ud6ZpbigQLI1Z4NPMMJsZAXRM38zmPju
EexBBxWOKCqmgkaNZAGEExdqzHC26w1NI86NNmwWWJ0zj8MHjWE1qfnupZinEAtCSY7sv5yC6p77Ohd5

swSOjnLjvDl1MGXoxO+xgqNiMFCGtI3bL0bYC+xtIChFCV0NLI3ESdUpdH15nhD9JNrVqmKK3TDL9uvZ

3GRyh7admK9BWa8KxGcMvo+ov0Xj9l9Zq/H1dDvlxR
+i8m9K2lli5JktGktd222pfFf+jpTgwEFfUELf8apElfKCp9pZAzFzrJHYfr2O3QXFjel+GdLlBRxw+H

/eOlXeLBbL4vCaSot+6cdVbhDKQv2+Izn25p5okHyygYckFSHx8fgrD38O2utS71CMyQIGkybsePJ1oh

AkI2ZbVjlsuCi7ASsb5k9I+m4hxNNNJHteDyZcFF4l
3cm4Y/wpA0iXb983R5lW4MzFiXdjDlqtTjEF5l/EeqA33GKLSsC/Y5cOaHejJqc4tGjUMvCj8mR5JwU2

+A9IJ1MoLIGw6sjX/QLZKmNxx0h5lstvNt9rmu7uw/hfd60td1RF5pE53CgChwZf/yo39gat5DN2XBhl

8/hVMcZZ4wUvYq5Y0qr48NjJwxoUMV3EX9qS5YNBil
A8wQXlRzbWrw5IVM3ydNxwxPUb39MkfhUmxryqK00Rsr6HOazSJbJyd66QYEb6fuwrnu4BsY+zaXvzvm

6KgdeHlclQdYkSo9C58AQRTh198VV1h/6aeOjwVRXZWhVwJcqezu1rWDwPhT+5GmqDqI1yVQ8JmhIrIt

m1sCWROW3T4WmpcWJWDG2iV+ouj8nBAV1UlL8OkMnz
oNl/pbxvcbeD304L9g/7n66oTUHVwjkvpLUyDuLtEQANyddK6mvaWgKXW6rT8CLB7gweCtogzCLMUbh+

koTzTGXWVziy9rfDyESeN4a/ibnlOQCiLlg5+A9xveccsYpFoV953H1PvGQDhnMwe4ZMqhbefyInWAUq

puDUrzYZrJTGkhPsPtTBG+822G4zqcrDoouqfWvgbP
OopPEZA1c68hA/81Xlw74dQuYNSltHzQaPEwlfmlH0WgRV3j5jNkIPwM5MPklRSG3VDx2R1WbL4hPzkD

m1zlz34RyQ4Nq0cCt0LQsLHizFnL1q22tEP0IvMGzS1UosMGmvp6tcNnhQ/EEa0qEglMQK9IyaUOoVTU

MnWRVrC+hxZJTtmkq2L69NwRg12k7PV77dQn8U4JAE
uzWNTtsPZtTyG1hPwfAv1YNXb6pYODF1BvNgCKBh+c7FkZUaVNfYFoseop0TdQxz4s41O3ZREd7POMO1

UV9IFh4mvA/rzmOQun57FujYScs7gHEnSD/q1ukFh6MB6VrLnA4x0QIVwOjdmp6B8FJsy8a+/7r/LHeX

eRfDslR0D5vfkn+tYnA2/BEQZqKaeVLQRBlzzfUCOz
FQbGuclkWCsYd23vXpdUk76zkB0vcA9vRs65aJKq645GIkRdD/2UjuAA2pXJjdH7ltRgx5+c21h5k0v4

JPuApFzNnk6lapqu1yDos0viAp+i9Qm/UM2rALcB6Q2bHasta+OGUTmazsyMign8cLtrXHWnKX5CouXw

IdKAfvxLnQL0G0cEneMhn/H+YTMAc7R+yHBjhYO1YP
6Z2kflMkvppIDs+AQ6RaYobOZiR+rlPGo6XpqdMbVHaDxArVfMgRuD4VY1Tfq4CkgEq+A4C8QhTV+d4R

9wg6SoE80Qcq6NEkYfQuaPTdgBUlyp/iW6wEI/Ov+V3dKyXO8ozAZ+uHchHhcz3pWRPiksqfct400vWZ

DzqchPCm0Z/W6b32ejG0KBJY/nWwXM79fyohOYDsc9
K0SxZ9kdZuXK7WiPeofQwws++Mky8BwKokVR+c3vNLyVVtBTtfln3FpTK6nCbBcT4U1WBRy1729b+Tgr

eOzOZThZ9KDV68GSTUtnChyZ76+npil8uwoVTLtDUJvULh+bN9OIT7hc388dU9+mj0MBjbPxilZz7FZM

z21yOtFZYusdjioLApE3lzXvUXtgyw8LUPmPzuusx4
D3fRDUWPsDaMxpHBD0m1ak9TrOrTMuCkWcwF65bKy9RtTJ+iCFuJz4cGHzULrUsdVZk7B3AEZPcqkWGL

DTXwW7e7nBQWm2IwK2s2y5TsEQwsSOCHd3fLf03BFgTA3T/UE4hfO6a0nwzfSCtSl7Kzju+TV5+gKj/B

+FJFqqGitQku3vtyClhuCB7O1+7Tie9OItBMopUxFa
7GEUpjtUcPCeZbFLwHkT10GNE477Mqbgb4E5VoG1nPfifERMRH2FTbt2/0uOofmrwyXZ66BQdsBy+i2X

6r4wZan9hywN2qwhPRMRrw0bQVxCi/AkwYKkEDd+R+3G3K3ZsmtRJxOOVz+9mnEFM+I0WltnX97TuntD

cC0KCwd5VgTgacI6rgp8TGpcT5CXqKDaJ0J/W5WeOH
uSCxfOqsE8fMqx1mYnrRsWrXs/ZQjj4lsM/hzzOJ1bWFfyPgSK8jBTYCCtXCoOKZc04D+TT5rvtcI3yy

Ze/3ZZbCrr+ruuWIvw3bHGhwpvOPLMNP8D1xWRGWhU4Vr5LkTiA3zUZt++u6NL85rm/8Vx1zXcN14hlF

pnTZinPVySsn5g+EPLkqzA3hfc27dsZeZ3HpYiq7E6
ywrsGjeZlZpCg+SUHq39YEgV36ckp/mGAb5gdEFyiuML4749Yj/fIjR7x+NhB6rsCNYb5ebgZY8EcxX4

W35x1v+CDmVxRu1zxBAZVq7XU8yiVc3QK2h+sRnY5Ew+egwTbI6JiSAw95sf3FqBf8Kj7wZZuXzMHkaA

GqzU9U/PTjXnF+t/lEQjs7Q5jGAEtyn2qRyjlDQRXL
84O45qdvwt8CvwJPRifE0dAc7TCUrUuE078ewDxO3mZh7EllCzyScYSU7b3wVqsfJpOv57DF0Nh6/p+e

6zZbxZE+K52tXiXwAa8kI3DtfMazPDxDFnajkJ9kT2A+x/z52nl0ccpSgwkqgziNPRADAaXpki5S0+BI

odwwX1PR+QZQx1hgTqHT8mWwSd24cIqYkhHViouaW2
fRtI0ySH7rypeUUu/vQxj/MVVFOhWdbBLJYIga46Q4roPcv56H24Y4DV7RkPozS61+RX+d5fmLyJ6Ync

XIMPBZqdKsthAm0x/CEyOdAulHQGR/znKbKzAvY674sN9jEJ0C3P8hEqAGA7Vvj2aaHR5822VNzqnd6y

n2NU3Fzxg7M3qmDynQJLrk0mh71pOsK5afPz2owH7/
s1UIO/k5a5Y9liChRU/t7WRLccg+QyVZNccoLp6O229K0gkgkUi+wJDt4E4PXIbonwz+ZU9pJZS2AmP8

7V3DoX89+kj7cpRJagp/3eFICJuJ5RIhLwU0nyhchhauwBKbshGkvqcewv8wC3+JOIi/SNHiu/dt0inm

XuD4pQOwK+CSFd8XsNEzjc0bo90d/im5v6e2hUgenX
gf0I+1w4yhtTJibRLWUOqAw2xbQMjHV2RX6jinNTshqIC4v4R2U/MfgkzUrkko4G1ynneARh8DKnBNx2

dMnYRS4JF1AasvBSlGmEq7OX+cOxifV+0JCfOVkQFNHRtQJEEOd/JO9Zk59VdHW6tyrV2fDpiTvwblJ8

PDwlfk5Z8k+13gHBQrI2/6jeNpBNtbfP/uBSHKwy6T
hGiunxWiQr+5E+tX0PDm0yQRY4IMqjXImuVRegU5w20dN+7HL9uQVWh361o0pdGWG5ojcNHBcw/NQxbc

GR41PC5Wy5NEFW/MnXU51kMTkuL0cOmwXV27/mcPeqFlffBWl06B5q4LemTce+JuDWhWvyKOk7lJm+J6

da60Zh9+0wo/1/iK90cEBf2j3HBpq2cB9LCdcvd5AR
xSdFYtrxyk9x7T/M2Ai8aF4PpVSz/j12/vUQeSCcFF5Of3/LOm3d4tmwGN8X92KhjzNrBGYyd4wPSfjc

1G6TP1+MfU4pVpA7BDH+nIkjBE0RTU6t18P/hiIunoXIXGM5TcHNCL9O4/4tWbnXfQSaFgPAuRAx96/B

h05ADi5tFP9l3no+rxDt37WYlK/MyTyHwq12JgIGyF
C4UVVrZ6ZsPPoS0MoK8cLNmOvd6pMjw8Omd4V5Y7Fv4tQHpfk3/cOAe1Lb6n9g3yNbrXf6VIZq9j2p/z

TadvXfpYyOoR0Cis3jCnpXilTGH7+LTqh20lVbJLw/aow9IyNEB/SPbYkJpSlhNZSCwmm4pS/YclLj+I

hG1zHSFwqxzP4F9qdE6SkzSlBbcSIvsrz+sXjwBgWW
iZmyrbtdXSIg4JqYk2qyTFGNmKqAPzWfShfq35MIsqYNLUt2qFIz6nvstzWA2sPKfq5MWlf7N6+v8n60

iokkWoCt45iZ0BHZu+IgX7DSts29AMECTxSoMOpIDb6G09hdADPUvXWkTf8Y8yOZ0dYR1o+B85I5cBaC

rwOxuS92HlWbAFrXNmeGS4fBqfpNiYGTArF2x5W0ED
9HwBsqFP/IXwXcwOQDowhi1VSPbV+1xgvuaUCD2TdTFjUghIb+zaffrfBmUYogRNrYH3Pm/RsvGgFbQp

hoydzUq+IN8U12+SQfqOLDwequkaDfCL32S0yG3CH7twTAeRxQyi+pq/2RaaA37L9AXG26LnKn9+lSD2

OpxJczO6vQCYRQlRDK59xVXToQWoVXXE329OSslR3N
UGvcuh9kUx48kXbPfndVM7W6beAsRV+u3ZIrXmkT9hiNTnMVG7c82k03UHQeOoub/rFAK8XngDGinJTa

v2ubV3J//mv+kk60JjDCXsEjl5AB7ijU6g+BMDDLASrNpvYHKfPriW18VwtM30EqW0NN6EVwx2I9U4p8

xXdLFMyUr32J8WJ1vIaqG1eO+myxMQMyWnGON5570v
/KvxHA+hAleWGox5VX+zbot/ljPObg1pcpOamZQSIBUPGmdfjLYftXRZ5RockrsGjmwbjWeyYzstwVHQ

Q8oXwTT9LtP5YdNUzWqkMe3itaYKm0ZMriHZohoih1S17MfxxVxW9CpsVyRDKSTIWkIjNDPAooShR35s

75KdseE7+ZF347TJFF21jJqnDH57W5Z2ArkK4dzijH
UL4pIuMsY84FNKDhBxudftC/s4r3gQ3BODod3iliInq4LotP/N+XEchXUTsDEcC9cc6QKAs30njQh+K0

EsdRmLwX2QdarOEhgF8ITNNWjX4LLdiCdLW8IljKJX6DqJDR8OQhfhYFMcW0WiaarWq6wBWVRTnwEeZt

gs9tP2195DVV3CN2XzvP2zljKpXi9xxfOZ71sfntl9
DWB/YueMo5roOjAQqp8gAxoeOTsDB1eqWmttBwFd23MeROWvrJLEwTMGgwYletd6jylH5rFyVEgnqHA6

YEfRPGrejOCEi5NlZLQ4Cjy60jriID2rjrcXZzMZsGJ3Wq7ahG/H+GnkP1wWMKVc3/AwpQZmI1I7ki81

LBBQTinIku0x8zu7rzfSNQlXJv+EeA0T5o19gCUBzz
PzhuxjRqTzXDnUCczxu1u3Z9D/jfWzj5BcI/1M/RSk3ibWv8Gfmxh+5BeUywM+3tMLRoYoiW5rVMaR2A

FrtSFHbkuyDXERi1ET5yONljU/xiEjFP54NkKjWjXPR8lpwixP21ydIOcA3W+grs2y6i0MHs/qb6w+Z9

3l2oEfwO2O9gX4C+rLAAQznN223rDME3v+tQg1bnSG
WwMLDoSujjXQzWJ6vKkmdDXCHnPKI7hh15kjy7GeA54Id3fm+J+Rr/g/Ii9i+IFvQEa7vN62FHC30TfA

8l4b53OSRDnB7R9NKuB6u9zmRgxZFCy+gSzLKeFbWl94xUkIk1ONKV2Hyl1aE+a43HFqRBF8jlAJ/4lm

rymWWD0CwfCOEF12BAmZ6nXC7W8lxkAu77b3EJr37f
41vhXLFpMjRs04PgewbtuTjDJtBJSt4AEcK2rCrJBSEG8olgnmuBEqAJOOUC1JHYx9fXjvPB8gcPwX9J

ZhgLLjl8b08itImLiegW5Km1ZotpQYk60Ue2cgTKxb7CJFUiKDA7LqC+YYEHVDUWvsOwqUgxC+Zhc99p

Bi75hGPlYR0Xsp4EjbxedjkhJOMWN3CeX5mbkGoeUr
rCcSQ4BkJWDp7Nv1VcEkIPCkBRu6zJNLJrQLREoazQI83m+lGeagFFArCNjSTFetXSMWyOSVYDfhQgTH

ozjQ5wh0HrUpRWZuNppr/ursMZ150T9b9JPV64n1qO+hC8HKlrPQ2AhW0lJ+3bKlVbnhvG79BTrN364M

Ar29A5oBMGSwwCZpTvYDjCuRvt2rtoIWng4tgaJjhW
+aGOpP+jsA9z5O5PdMVC80zTA5KShtwCycSK0XYws8AqSIUJTSj8GXMFTpg0rtRK2ssnm36K3Drlqj0K

G1Zjg5n7uYi4FhwktVEOJ9H5GCy4n4BMQGCDRuq9jSjfALnz7f5LguHBe8RRumnqIdgKyhyFDDfOI3l9

nUWaIycCuuOz+RNdazNNVWHW6abZ/kGnbUjJpiMxrS
TDn1Sp2iPcnWAqgZKFwvWfIA7Iydw4nTNllDe3NVGme38OotB2qsNwEFNv2Ee3Qgnrc5Vei1Ro2QiGrf

npmboEEaxppvjAEkxqEmD/vhpHntai0YMgQ0szM0asZZOozoS9C4psXfPMFp2stQenzUhigcS3pDxlRo

dvAE1lWSShpzK1Xkpc2muZXTB7YPCL/m7pBc0so5m7
P5RJPaMkyw+CA/nvKjuQHapkA/1v2BLCEFtGg5CxIDCHo9tHYHdpa0/qgMdbcNbmULLjj6L6VH5dbJGA

bBjh9k4Q2hAq1jtKaY5EMhucvd/TDKgnbuyJLjIeyEVRqOFwfgF+g+Vj0x9Fhx0VbJXj912yIF42neFn

o7Td+uOeT0qpm4RwYr9X4Ky0Wj4gP/bYir8cFKMfFT
85XH4c2+SGi/2ambFprhUnTdeposX44pXXcMhw9iU1nWHHfM612CscVLv5GaIkm04IB8TOkc5JEByQZq

S2/HYbbtno2iln3rThzsYVNudLR0MIm8vb7AI51BXuSApJQVempYjhAaU0gB1my+pVMWxB8Qf2+C2ZF8

TpmfPnGgeW3JMHuq2R16ity4gyLk18hWdNDwakMBXK
FTngpA8N1e9CqXXq8+/J2BkVMfniYiEtU0MFfSGsudqLJwPoO94A85YnPEECNreu10vH7gicTy3eJhZ2

8UJH63N0BrIwxnSP6fzcdnrgiQBRY7qYHCMmMwQSzbG9bRhjV1dhg3K4NhpF2gQwmuBzR/kGhkZXnz8Q

X+WrRVmr2UfweqMG+w4eTNJSXtqmZmZOBuMmEj40+4
Vu3UT5/VK72gewiS022N83DzT2iajvuO3tEPMrfDN45UJAw7DdyN12w3Um/NOfZSBZD9xymWUvliEiSU

iCSi4qOB5Q0JmKtSZZSKV55n+iuPyucp0bfI+zktUWU8GoamRmJ33Wza8ERvfixJC56GqaGKehJVdFtG

JqdHoJ960RfWnJ11ZbZJG/+W4DwvriVFOd7yIjcHxp
4b14cJStHzoaGfYkSkFgzITiYbQdkxUFQslcso1ledl8334jAywLpkvN8zQUcei/7gcPcKREdEXN13vo

PKB+ie0Ru5I2+lemuMkPbX6BKpkRp7/ofWuucsQ40FsQ4dRUDqehK/gUSP+LOeyrNE+9AVB9YmgkYyjl

9R51Te5nz3GzSSLG7G6Md2GqEbbyulMB03M8xgu9nb
7jsY2rwGnguuVBMdVzdXnOTsh3zUA11Myax9JqWRtMA/u1/k2qsgqqzkS9gKlOOL0DgVDMvW8RpEvwkT

wnuyANg6QOhfw2uK6NS6KDl1AbMViwhtCpYorP9wJY2649NeTOhm4SvUXG9o4DTHADuzg+NTyMvWhsxX

4Gp6KMpp6589hs8hs7ujTTS3BXt3+eEE72Pi0VAzE3
VBTe82MTrAwtAIb2L2rX5SNLQXNo1rm1HBsNtXQj0W8HTfUVqoPW4P8CQNlLyROjG7pie40cpP8jHZIl

iuwuu7i+loFEbfBm5pNj5fvRlo9Wk7rVCN0DER7CxHemH60O0HSW3DNeTUO4ITOfVC3ndtLcjkarSKB7

UO15ezLazlN3/lnPaijqE05+MA4IqUbg0ncbIWluC/
OUOcYDVxarKGD/RprAVLW4fKDCRSuj1bfw/bB2Vf2U5zopqjLBNf1Uu/QdBq7KMtY/NXmo51w64aWBMh

9uZZYUl81QlMWehqFDqmeGfa7u6yqf8xiD7izFrrXLDyDBVXeD48PBRs2wgfcSMInxUO4vU1cUZ2kEE2

wfH6bgKTeJv5pyUBcS2kcWRM4Mp/l+6/CREHJMyuBp
NrV69DEQ7wjP3lrkhJbDEj2y/Tp5+UEEuG1TKUDKvtxTZIDcnI4xcE3or9tDvFuI6UP3FTTW0+Qg60/1

vXRI9HSL2MxCYteC3jApmyBFE4W71fo8iMgpelzhIs7D+dOvtfmPvPyAS06aMDn3Yl2QreL+poJdp4r2

/h72Y0t/d9Gu6/DrVNDY3+F8q32s+qVQYwAfDRQrQH
dzNrSGIVAZDu3DsRtJ14QrxL3bHiKDV+7ejLR5IGHOGODexcuNXCDCNrI31vuhRyn+Dl1Bs1U+mappnS

Rw0wwKNl+k6knclV7BQuZY7vN+Q7VamZfJHPaeN2C22AOyHIjZdiJWbXC6H+/JxLdl8betKeaxJLqRPo

6m3dlLYc5VFUXsqRnRTSdkWSBdrC7sY1Z+LX2ISHFU
SlMZTaXy/p/S1y+OQurzwEnluJ3v8c4HAnU+z5FrMT2KQFox/e5/sHSfcJ74UcmzoBNpG/JSfDWgUF+Y

F2Jc8+KEYnLGxQl8y//tRjyAS4HlfrYm8mPUJnidqJMj7ArRLB2bhaqcYFA7EPyJ9AsFIANdYEDoKODt

0tm45S73XDDg2SKHUmiD2eUYrB/VRpwWS54c/1oQJU
ut7Op05ONh7/9XSAKM6Zs6Pgap67e4L6gOuAIDGxCmUgS6zn6AtHn7f/OD2XJrOqjeM7nKN9vFloK5fT

mfHZF9Yg+hWCvY+dQD6NLVibNn05ZgpiTnl2XmvN7djkD1Cabgq2GWw4m1DuTCK2lLfZLja9l2QTcvcO

Sy5ryiTLKl0MkiI5L3sEQECgc15gCuxVzktU1HFoxL
BKJCyP4ubD3UmJHEuvw+Ed+kEEQEoGGmfCjjirt/z/ES0CCzLWSt4pVc7emubGXl+Dd2qRuHjZ5/eTvA

rx1xtlO1b4V/yDWi8zcJev+kq7VZgphvjgL19T73sTvdZVgL3tqS48GHJc/fH9YE+KQ0MIYqjGOtKY9O

px6t8kxzkMvJEvEZ9ItzLVid6kWRerY+D5vhRQ/gt/
IpXLzkL1Vf9ps9NubjnfvY+c5QEhjM7t6YRNnlrazkUnNQIjFSZtU5kqRV2DQ7p2FmadmqPs1TaNMDf4

5N2j4xLfp0Q6g1yjRNCgt/P+ehGx2OPE9ceBl62cufQhymUdGyNGkHuPtg+GA4+DdtGddIxJ37yE/hHA

e87atNB0/YGta1x3rN6G86r6+jkF0ZrBsiFAkcPaNM
xIi+z22obGC1pe60oudBOHzR5tPPmTfjWVkMVY6t0SyvaoOHJlQEHqKt3EYHwapCD1432iuRexaCWa1P

5/9598f1McynFQQDuy1MXvxif2to5UyXJxlGU9VXX9bY0LRLX9HjwLGgUFQBssAhDmaqRChV9RJyjxPz

QL2KWBu5IF1sdV7PjoZPRhBfYdZBJC/aG7uyq/TxZ/
wi40vkG0jfC1x+B3CPadW1yVLLmFhXzvDIT6YKuW/6iolMRi0dmju5irwL0DCs1hAIS875XFFh45fZdK

VhzDKz+Gq2bz9bSns0uWgcUdvTn3isgfORVKhQYkSb1YRhqntDfv8saWiChMPAxgdCkSalHGBHgEKJ13

qSx825G7hOYa4er3abCLdKj9cHWMA6iElecLkI0dVf
Hl8/0s89oAEHWVYjLaKw+Acuu/Z0whPIsGZdxh76eGj/iPwaCwGEBK1uakUV2PcacTXh0KG7Ehb6jOoz

2vgpA7LTPd0dD8gDLFB6Z/ao3vUGfCGN34fL9bvPODMVdmBa+r2EiKx/CKh3t21CNqIRPS93X+Oz9G74

scZKx/dI2P2AT/4urQaVpkjWkkxsHS/E/+5mMqOGPA
zXw5GLXTDoR0zb+vfigNsJFpHto7e7udPqAOGLsKXcvDvoYI+RmWOebV66l++5BBqTa1UNBpRWnUZPzu

RMRjcAiYyQL6A8Hh25zePD5nPTn0TcLeGU/5NO/bTeyP+NHOnk3Kq+t5F+SjS40Q6IEW7mUVGShGPFda

4dI8TvZd4NIMO6Hb0IHQPXVm71DXn3DknOI1aG/F/d
khDX7jReirwN36fyXMAyQOZa//Rh+/t6PmsxFUqKzf/KKrT8eaIn2dKwdv6InQ9iHOy+kI/wty6CfWPf

dsqr0j0v4oAGQnq5V5l8iDCn7DPt7xv3xE5EEAvfKOvQa0V6zP4xJYS9mi7z2OIspzS3OyNna/leLLP8

A/X+I8H2ALmm457xzmivrH43b5Xl14H25oXgkGDohM
HfPqKtJwmhM3NMHkBF57yWc9CoytdKbrpYP5nLQlQtZB1SnY26sHjLir+pglacsBV31/ov/CTerb7AQf

jnOQdjp1xoQKei1v71NsO70uJlM6IalL98bMhEUGrFUTCXEdrms1KIO6TYB0v4zMUyVx8vlYBLnjRcYx

aS4z0GcKOtS+Wt1PKMCtvuUgo9fZ2dpmzOZl7JlMtw
TmCkqzT9XGLhSpQ5q/azZn/5/ZXNSKcym+PosmLw30ljNi0EvCpXDpI4CWa1e8Uy/sC15ggotBRZUU/F

vPtcx6kV6tcOJN1cLgogFQpbmIRSMVGliUAAybObVSfb5XuSn7R/yg75FOaDyQNIj0GHuhoEn5vonY6D

BXwcO7lfr8VlfS3Ebk537Er289MC16YhBcKWYlZqsh
TGWJNjERf/onDV+hqZAYiulck9Qy5brr3+F+O8JUCge+pyDcyzFa+mvJrapTrVkETlcHhLY4934lFiLF

NOYKYYFBHuKWh4d911R36Wn+uwfldmUr4r7dGzgRu6RAM+Wr/fLOg8Yst6cHBjiTsrc58ICA0h9lr/bQ

065flfU5bHuXXUfk2SX3eMukGTfVbCBH4G1LQqAXpo
yjx4Rpfxt5lqSgVQwhBHLasAOXkb4L8CIQoEQW2qUDJaLHfZLztseq+G2ErJrXfyTD/nM2sIUYBADOMR

ZntgYiKKzK+hpUXSN1d87fgKzQT01PHlNF3uSL4+8QwHx2M9Ve6uDntDgp4jk9RH4pqV+MBzDMwRzkl9

/It7+mhOXLZukUaJrQXkJXUUPydp2rPFz7qRTHwNoA
u5bYdzfj7++J1VqyMyV7gqJnDFY4npEeHkcf4YpBKwwNl6VYq8Oq5W2eF+9MdlqcQmKWHP45ZGn77zGI

E9dHCi0nfCKMmc/8w+YguyhJTIpByAqpHi6k7WuW5S/IOseeQpHCwF7KKMi2sNqd0Qd0AGHcgn2HBY1j

8LM8PYJg5Rmhq/KO87/p3ikDj6P1pWwtpEMWICiZo3
sgQaCRt8XkLgTVeq6iD7Z0OBnXTqT8g6NV+xuB8CW/3aGbDGwkiJZs7RmBwdPXscTE7NsA1rEwwj1aiI

vlNglRBKdR0VnxUGMMoQDX1U0pSZ5FR7l7AH0p44wQCMCraXzPPyvJD/HXv2/9a3fdX7VYMGgxjZbAJH

+ke+TVevcgYxGwZcei0QY6rgmx+D7cS9b7/Zl/Okcy
Z6EvSggwfIIEkm6CvniETl96Px8I1PmGAAipFPhhiMO3sW66qAfPU4CVxH9/v5arTqd2pJjWp0RbYZ13

nG/lx7YrgtgtHYacdOKcSCumwfYNIeZeb1L6GU7hBLawvXjf1kq/mgrgCTkY0BnCnMpqYRzI+4tsVDAS

aD28Usnp/QwUmX5jpzEGf/kOPshEwRG9bc90sobcEC
9D2yI6g23/D6n/ygUicKqoPoOM8bEfaAVIvO25xXCC//PCqP5Ogxf3Ql/turqznE3hcz2He+0o8sJLrf

OAn1t+J8+QOVGHqqInLOUWcmOVjwuc3CboE70grbvArvXG8NDDS8EXbFAiaP5UxCzysZCKZeGtSRVOQx

M2F0Wu10+r+0i70IRwJQ8xxH/nKQPxvEk4JVc7EGzC
gds85HnoPxTAj016nKJiWKR37nb4EXQrvt+mHTxIA2j2xXlEdrmpqYP+nF64c4G5Iuas3qimTYeClWA6

YBlBBfGSVv+K4CNg0lZQvTN/HkoVbx7SSbsxViukHEKU1ZXORv9TOYP2rQbBy866iuA2LAFqjKlhr1lH

xn2kFsQxF6OWZEpPhi4m/lzNlVg5AXNuTIR9SzlKUv
RydJU8tTyb+aLTGPAiw8HA1uDlcHNTdbpj2OhA4or+jSuzU6Ryfeaaq4Wcsjq/InZjPDnvbJaIohvwTA

hsj+NzRrBpy1JqvUYpyb8+T3zmSMAacFUqGZDAvIuN4/ku18YmR2YZ7kCxOCrVuAkgF3DnrTqLE/4SAa

I/mhnl6z9zm6dvOZaykQYsv4cBDCQvzU61LNASzNT1
CXviKBx1rq6ZIPhBpGxI5E3oFvgMt3Pz1vkTewcTEYiKrV+RJ6sC26mx0qzyoV7EnDwvME0b3ohy2lUo

rYNqYmtAp32rpXK+gtWkTRFjqZCveylbShRtba77YqybX8+9EFp3nLvhneIGK1lw0uus1ygvBV6sgHQq

X5D55mUddfX1rf8jNZPlwtSSx2rtt6vsFaXUDUdrat
oU/vT1gue0S933RPRDtqbad4fA9OkCnnCXlQa7w+pv5rOmq5MDEw9gWrULvVArrj2dsjr/LVmaxDq/1m

8ddHKAtv7HdAe1l5u3KRjgtDod0ewYuABgjQs+SXVyNga0fSFH+lg74gm/Le/N/hM3Rc8TvkaOwyGa0Z

RjkCHGhuVQ1HAYTV5TkBZsSqRM41KdcwBOC17lKDvs
7dnmB3upNj7DB4TTNHIB3+M/vN6Vr2MuuyUEoROLHlGsJ+06tLvKT9jKze5t4uNEpjPIpoD/uvdzEuxQ

XeuevhWTj8dJfDxjDmWwp2/G8ysE0KD4My+Zs4f13kyHkMyDGX7zim+OwU4ipWoURjO39yqIWG5bZvVx

1enrD88iPoAL1VeDubrOVEm1HvPgj4Qp7Iej3AsUy6
1MfwibX6dyzocAPiQ/0i+2BiZT9vl5CKFrprYT0M/qG+uNM6oIS4QVC5qe7/zpEbTZLroGo4VgVONOnI

1GuzODBgn8bj/Shj1FC+I2c/e0Ebv1kI+4s+8or7zCGgVK5rfkhmKOMPPk+MtfOtJ1Sf7UQua+Yntv0b

fEX7KVM8awAKlEU7WNG90A4fXcJGQB6iJVR4DflYCB
JURCHm0q00/RrVJUq7zw02w7e205bZsQEc9kb2TGCwdYCCih/EVKwRrKierTMOXG+MxZtdsEw+3zdyoD

ZB/ke/9/+iGe/6z97b6WBmYHhx64JHV2/Zd/5O7VXRJ7dlgrRsu5Mrh/eeUYkOb3HKLjfix+HX25Xxrw

5Opc5OORxnj3QLEYxv3GHlcjt/L4XIugtVbfg0J6eu
wNyv9yfBYWXjn5JjXQleAjnVo6Z9Tau8nyPEC2aDL+NofIVNfXifbI+1GXifW2c9xzx4dl3/vTzvnVQN

d6z41/m/n5lFPTKlcUW7uBLYe9ZJAYoC2Fc2+ooDP/xYhdPnDxI83A3LXhkbmrtw9ksEMp1zfNq8ft7Q

erpdaKN2tlDXuosxJ/ZLmhNurd5mAy7C1556d+mbul
PyABUBnZ1i5c0KC64/TjftpYAco+ZJNRSj8Lr2XcnDIZ/NsD4f32R2hdm6VMaQTcWS1HBz/15UgvpYJi

655acAQrVbBsqoKeCXgDSR1gPD4cQ5nCljlalc5PCbQCYJyWMWiilGnZe58G3UgYz68vohjY3KoXlDHt

ApK0kBMzoae6Z7mM6p5He2FZu6J5BpshvRO37LGVHZ
7/rI+oUl6jBggizkS+W/8v17zIYytuI1uWgJrA6N4hQexzJDhwXqe4A7fUGv7CxXdPnTSBuYkerJWk4X

rIJC4iFAMOFu3psDf/cdewv8No6fd2IjjYVrRa91YYkl+X/QC7PI133xFi+fcjwcXITqcvqnYrXoUvWt

RJwXof9pkZN+vFEXHRMqoad5QfjwNYUlcCWFw4I/Yl
ApJQCs/Wlq500c5r8ULcYlYHtqe8th7VLPcBIsZMH+qYW/qnU7t7snQtstcIKTL27+H3i8ZL7Vy2uWzy

wsXTEB/m/ssjdYLGCl076iTkzaHxLF3snngrNWkRh/LGq9nzHAMP07nCm/D1U8NyHUHi0YSgwsSUwMLt

BDpurlp4xzOLe++6z2fE4Ky/sN8ywbq4BaBk/wM
pR7sx6w1Qf4uw2pzy1MOERw5ubv886EAOL2r2t771uWkOF7sjzPBU+RUQUQzTpNLgTnXN/7ouTcDI7ME

bt6zGDcn/InEtIi3az2fOD43a/C1ziRUPl50b9uqjGVviYoZESiR08wBuYsmqt+jYscNAyZlINQIzLbZ

B1Kh05GrMWmS6BBgTdNaqs2spkmMWingfyL8oE7XN2
myjk0NzQZCeq//VSMb5thmcBXL62XrIrOHITj28Vp4oAY7Y+T3JwLYeOyrRQntxreT9y8u18okupe3rp

z46fnC+RADUj9Jgm72vX/cJ21LNFIyj16dlxiLaVYK7lL/f2XBYH91+EW3o2zq5TYMn0ZkixLVh38Ytc

TRdgwKdqolDSzD4cxCycmMcnk/H+cIjZIYRF8Ox4Tp
1H8839d7QEcUX0QfSd32oSaFhUvE0a4gJeK3qLNw/cpz3Grxjv+j1rALYLg/2WtJAb9DVaFIgsNNp6bU

g8xlOXYv9y5onDpHrKSjuffUsSh/rSEDILXEl1QRXQ58liHRiUeeUgtQeOadnxyf9wW1txHYIu/JS5nS

ezbFxtuYknS4OXkpyy4WEw6HqvZisbclHbtde9BBI9
ZMxqsByrzx0+Jvvb7/p308zpX9QR6CpRN2e+yjqPBN5IExy0XPMLjZ+YfyoZBcRpiZagv8TwohMV/poD

Uzf93KyGq2oV+ke85HuvS3Kw0HNWChUN9F2MCqXkLoJXlL6+46fhcuj4y8wnMEJfY2d+3Mw/c1P5b7H9

Dm4V9x8uX52kH5+54mQdL3AeOKu39TqJjNqmcUSdgN
Dkovg+2XPeFqDGyhuJgAvvOm07W/UZr9o0U1rdpJfSd9yUQYrSHE9xCCEqsx3s22zltGUMYhVcmX2bBB

F+W/URsUuYjYMMiu32PnR0iGCFE15qZHyhnzpLt9lZvEsHVPOM4dOQpERSimYHxYR5qtT88zW0R5Ehnz

G4NEHZw6Uh6ajxBqIG9Q66DQu7y2PUdIm7aSsjjzid
mwzEhCa+7tpTGOv5l0MbyWXlitDKexHZunLvR4ZDzFrAKoshyPiDlHN1/rHHin39p7L4R7Kq7wENjUg7

U1ZQhmcssMICJpqebsWGHUaXfcOIjca0eCxQ0tmydHuRmc5vpNSfxqMEo/ZKyXNFZyWzGyVABYSX0H+N

zkmWxjPw5goh5PEPyL2TAMA6o/IrLm6T6RL5ZzlVIR
5W0PW83JwgeqqLNVMLwDXgZZaMY232/3zDzGl4K0Eb7cO2KSzb8juL8a/2Xps69vlTgg5LaXuiZWFBja

q5Cz4kI0rokJyXPkDHCE5CVz4agoFtVqUSX6ItfDkuuzrl4H0/S/9JwxoysCE5fGNbPc68MQ4hkF9m3X

kXWWNvWuxdfyV+LlyXMVfXOOTyDlx5MyRCeoS6BKOT
onjXuoE1cH/v2MpGO+4zaL/19igCZSv0fzCb465zqDy2vQmsHJn7eY8rfukLJuNOeuS7NoF24YYJxFGG

nuuLl1U7xpPQcuR6Kzvwnma+q2e+w5cnkAuTyqUo20kIn91EKSc1/jOnId5m9dJJ6i/wnsUrmQwo6VNV

lKcezE1r7JK5rGlS+XABKvAnS9AUTr7fKRa3+lXuLb
CNGRQSq3wI0klCEX31vTkuZ/eoKWrWVwZUphWJ/+DqgYOgTf56yA0rQF0RVVeYKBpUNSZH0nfZHvmSBp

G+XwqQDw01kY6JWzU8+yLt6K6zRm4AYwMY3bC7nwARle+wklRq4ZNW3tWcp74uqm9OoS2k8gnXCxbR25

FehAopnA6cECtgLVuBAQ9BMI0ZFHzuKF9RsUocOpR0
dZsmPH2DEsiOkhyhUHohADh0m8T24YkjgWTpjKYqsWIzL7eqH5ArbGnV1MzOXuyxa8b+UGCjY/T8gAUV

svqq/PmQsHH39OdN/qUtSsDz0ykoqPlNJoz+59j9ku0GsEGdMfcxGh4vyG6DgSObQCLUABFE7ZAlP7cR

7NddDPqhhjqNt5caVQykb7VuNwNAP1LvHteisv7iVn
z3QXLxoW13ej91/eLEupJiCn8Z5rDC/d6JAIeeVBDkR74X9Af4ZvPlvJroI6+vqOzhtV/eo1mF7ejSv+

VTP3qie56xv/6WGezPPRit5+/kM1Hhy39FhmFpz39mEghj6LEZLigXK2wSJlLXdJb3K0Wf9WQnLC8xBp

XmtV66ASTlD2Yo/8vFjLu2eyXBCFejh/OIOM8PFDzL
nOnmNFBzgWyuuKS+LmL43boHdonlbB1ngjRy870fcZpTbDCifZE46SwD/XEHc8gG7SnRwifSdxwqR1pi

orlqAGbwe7BhYjCUTw2yNIh4Dz/QMVpzB7BBQ+wRcPjexRRf+rVfo+BIFV9hkBrsce+WPVamqVqIyQlI

bh/25nuDsUJlcjO9FJDi1EcPhnboWm48iULsbI0Kwa
TECQv2OyQgaT83F2tvW0OLz3BwEy0PZppA2YspnZ9dtDq99kL+ptGYPMQiVTcidnFZc/cz5mQAXaZB3C

ZQn1dSSzEYiWvUEJD0IdMGGdUoCRXvHKcWUQry6x6FyTtsw4HzLJpI5CVzOpFrR/snlmHvqpm4UE/VMc

FOZx8gnoMifntajhbB2q2SDtJeDmgRlmjxjCq1Ptar
tgheKI4W5IaQLh7QDLSmV/cdaBDp7jtJ0boYu9SZcgfIy6SZwBu5D14nb0OeGF+1rKq3aTqPMAs9iLj4

9ttkgddKdoXAGGRu49jF4EbfZeSZpJSWdD4ffpo24YCspicmwaf5/woyM0f3m7pAn90DgnMG9hRwKqgt

+oQECN/XZZZVf3ycIBzQnAWh67WaScry5JKJ2zNhjs
dY1DXz+BmWfa4X/uYN6a9BWeR/thRSRtG0/IoXGrTkrWbMg8HAc3WwqkKL7eODnUb28mz65Gy7G+p83y

r1EYJ6h+BOzSHz3u+RNkw04ZdagjfzQvIEGLmqIBLr5S1vOEkUkzU9PH05kx0q07O2+/VYVL1EL3ylHU

7pMOkQJVi3SIwzb41u8RwL1JjGbA1nkuAcKe/
D9GR9bxj5kdKv/wwK+3k7/EhXg+H8XJqSAbKSsBalLaP62WLQ3Ta/EWZHaRaqf+uGJELZFhNUpRjkKbY

7wHCpadEw1miZgNVH0SJ+BZufmHgEZkv0iNkm/JZt+oTlTpsyz3ioyYOK9PK4uruWiZKVWe3XFskSJbz

dU0fwR48jZO8jpafqa14B2mTyPAzanu+qw7hXobDwn
TVLkuFW6x9w7YNHM2uNX9xiSOxySgint+a+3jDenimlp6aq1Vn3nabV5JrssHHqpjicjvwQDjA/XJNKb

6rN4Zxj4Dvo3mCmscet/eHCCIPnTc/k0jCDYhV9d4d5zkakXdTjnSj16tLN3GmOBBQBpQ0ICBR+eSd0Z

/ZP2MPddnWHmVVn+v0xFTl2DuG5Gq22K01yaczFNfl
gBtcQx9VUZE+EDoh1x5LHwuN/TPlWflC2C6epr1h6+i1wtidcVoZ5JZKTJT1cepbaCRHs+pHLLYQryJg

s7y+5ukdrlRcIAB555MoKtQQApC/TUJZrDYECfTVpKYGP447M/R5Zcx9nv023QJPuGRnIFasbInKJBDD

51QbJZC9Uyo6JiuSTDIlgjsMs0myBRBvLiiwFG1mR7
PsoXpnDk9LzSJiyAmbGsEyZjTVJe31t0DaQGemtq/UduakWEdirQ38USHcjWRebdvCikFnZ7pgSEkb5v

AooSPs+U7f0p+Vo7YWUzeH1tVXy1dtkAhGkxH9gF3+x7qVsqTrBuT2+w3d00R+9BV3tPSA0pRfaEYeHN

2isvbV15cM4ExZwYSbAgRxucPeeBx10OizNaqw4QSk
gWkcGHu3EDwMu0r4cpQCo7ICwspSY7mw2tPW+/95/3gMGtlIATVBsX3/DVMo4eMFkcgdAKFbmestSWF9

IlHbeDZ0lYi20k0WM+61j8Z+E8nWxJ0/sp2sUsAndx4exStGijyshrQZ4UnOWqH/mNDzsRn3zREySueq

5bSIRoD+QaWaJu5uQdwNJCm30YaIU9Np8li+sTk1di
cSlwueaRkybO1lqdkLCQLdSE4o0TiuHNnqb+XVXN/CTKmP+AP+ootwzo/aCuroChFiIt1/th+Gl2dyOy

spUl2HAFQ6a1e8Ln2uN4m/E6X7hUD+RJB05UgZ0bzNMR5Xz6OrSBjdzIvKUY74XjKs0QNpHG9jP/Bf0h

6LhfBkN4MQD/MxkvYjIjCZR2Sd1MMPlr+X31HjDGOD
QNqfc1fRzFV9UtOsSGcg4EfHTpdJ3n21fN2XEW5jUasJSIZ0yBbdV1ZwF0ECy4ihR7OJ6v0T+Z+TYA0/

204IgZ/6YJ4pzGPhlgOevLGPzrCp44TONPFPnsMBBJNvwRP0jj5jt5eXueKkX4p7eyluTEvGYD7tdPvL

zQfflVitI8VR7fQ/Q9zA8OsU1gY49KcicM1w+r19p/
+f79Yxisc73DctDqjE9F0wsxO8cqmCDLf6Y5Mx9KUEXS95mobOfjJWJIIU15exAfry2GgMclcBqHbrfh

ByEeOERturHF5ARQsCFTf0Zk+TNtbYeqtnpnzKqMD/DulgUHq7p3H4t2JgFv3OK5RybrgEPipi2uNHl/

Po3yT/6ilyYeE5lkc1N0PuLiu0X/FMPpa03y9WjMmH
Tmc8xZsxflwCxCWl4YWLUZZbQatntHmqCMVPqcr/IqYiHqwpq2m7ls2MYnu6ap0Yn2YTucHzjA7TzQjR

YulYZvuiXdHutHuqLgnTya1a+c9qptbrO7KE85N07dKF0ShLOQtVs5NRXYrFZcphmZgX4hRVqaI4q82V

BjPpH3WoAsXavqL41AV1kQMu4lM2tOX0azn/97oopY
RjZgHzryrxGezvDLPr59HMllqVq+Lj4UzEkJKkWBcYk3ohhhrJLm/ap2ai5fQGKAbU94NXIxwobKwTa3

Ixen2kkeDx72KhXwbk2ZOEfRHnGc0Nvzw3GR7ITP+nXlgAbFIu4w/QZ/+RtRF4uNya03hbg1kf8L7maS

M62IMk6eaXHL24eN6xB7Lop7AWmMp78xKpq1eJXkD/
svQEQjSQmQMQQou+coTgouU/J2CIxBPofo1y2oisf9dyYj8755S5YNCsZRhziupWsqEg2WajBLk8sevh

w8bpeTLPW2O29zw/dmD6Pg+QFdzP7uTDR9ejfIq4SR0sjg8ZjtaM/xncg7oV2Tx/3mH3yg0JkXexw+0+

qcxGw/y80rvKFx3rgzXkiCXKiiLIWAKN2C4ugW1DJ2
J30pWwX3tqhuqsRC8qIHDnlkL0dm8et3UQZi431PUw5tJWloxLxAGIPesduX6MrtzN8xECaj5R83y7Fj

j87hTC2RS8alzya4rrStDV2xxhqo2EajEW5zt6SyKDxv7FLgoWSrFrNDojgCFJ3A+iA/dkTBOAeNNx2J

px3yHVmyiiMC5jEpmCfyhT8A5zjl3s3lJk+/wDnRGi
Nrc8HGwWIa0rc0+wOs5yKawnHEhZzsyPSyMzHE83OeR/fdnnnLOd/VP9nNahUw0e1Vo+iv6J0GpH72gc

1eVwZAftwd9/Zvhr5/I97+SWkIw8NMqcohqhPIvpou6EXSDxXNVg8hM4rV3u43+KtifwysCdeKolNrkW

UIJbo/dS08buNyYaap1m/Qy0JJMZBAlKeExucOCIuR
BRhvG/fUH2/8TXZC2nTA8OWVYyuZJ8BF32Rq4+vQR1251dubeaLvuBDB/S/gatakkEWVxRAYwT5k+yqS

D45Z9UIVliIZml8GeVYRPxVQf2/VY3f8Hw7we2Q4Hgt2gR4QmuSeCRW9OTE7pPw2wrOSMM4sxbuYEoi2

WK6JJAT4C5RqVi3bqF9GsDj5W40ji1SbU7iZobhtGt
4ZYnCjTBR9VUA/40PZZUxhLpUeIPbmccwFYExwP2houkfGDp3PZnpJy2EeCxO80UjAh5vvTShCNgcgU7

61epx5fTIxmuBQ9GvYCy3vzoFIs5HxstFO72IVYffiNwYUf3NfPm1OzLuEVhDQUto2Dbi+AdvzR+zmRG

IbU4wiEXK9YGTaC7y+cV8HGdyZTl1cwPyXqKBjXT/8
i/F3nu5s/0kzTUeI//0IuqLAZ32H8KW89THiyupUhbxDd2qjOxN0zkCaOdYD6r3MDP1txgAY/jEUDgSJ

tXYpDZzvvlElmzUBzk+5J/vzO4+AOcjVfgpkkIRmLvFeyeQdcmPnF6lrLBCwSGijrB0t4+H4vMPbl0IO

XLzuKuFER1y4WN+p3KH5STTD5AIBIPbkShMrRWe7Xw
zUTm9gPtLfyK8wDeABMtswcQSgcMuY4jd7E9o1Yeo3GFkbxMpnRFxDDBDBm6Bi1n3I1tvrfmIuWxXw3T

RUdPoGDn7xK0bkD3+B2OjZa46tPDIZxPKuLzmISFOG2zAMJpl1j54YXUPdBFlBs+1ErEmQSlzd4fm0OJ

T/3226gxgEDlvUqDxoB6Rrhx16UB8G/5wfLN9/gHyr
Y2KnYPaTwhoV8zfgsO4/X5lNEtNoL3ZfvdPq7PtHdW1lZq9yGtULL6RG/ipYWcEEu0sg0z/+X7b8enbF

IOxmjfHQOsxGGlVBIgzYbFFcWBeaGj6GOib5S038E+ILOofNpjv3QI+IBrI9gRBp6VayAdfzL8eauyjN

220y0kKXv5vEqQDgPAM6mEfB/u9niIl4+celzn6e+Y
169tXCj5OOl8LnP4oonmU1eJiuWauzoLQjECRpJ2bX+Mi58uitx1sEFk5yJr7FfV1yuri1M2+M6YEeuE

yWwI9LjyGodWur7ZiM3A1AohYjXL02lMed/NuqLtF2xRuzTW0jf5tWCv3cNlrLglfllMPMRsLE1Uj45s

7tcEMiqJ3FYuz7zIQq2q340FqGQMm3rJg5zmprtG8p
fJ5+lVAWsNL8cCEl5yX8XGvCJ8CuyC60EOdRMvFzx+DRpMUY5f1Mhdoid6PS0+a0mYEDnCHsfFhDZ1Zd

k9Bc/uXyK04B8JLcUmshEBC+eM6/Hrl+IQmrey01abSucbCPTH/MrTWu737MFsnj9yK7G6ePz6GFoB2F

/48I80lSZXxsJJ40fhm670KcJLk6Utxogr5aKmUXHG
lUXSA06MxyUBTE7vpP5UVpRtzL/yyruz2zalXUOSaenVzQsG/E8Y50+6Y+jDrAciZppXNEwN5zegw7Kg

zh3T0lUa4S9E736Uugyuxz68bSz6l82JtsUI01cY4ggJ7hvMEFIZDkGx4/v/X4fcRAIuaDxVviulDQRp

B6c2kxgK9fXVwwWa8XtKJlRWOvJYdqAdkGcDGsuKzr
0F824abFifpZe6IFT1zqbqCzHpNanc0gv6HKsInAoNmarnF9rM/9mlAxCF1j9I81fwIbBRmlVWrwDC+v

X1KMBun3gMe2JEUhz6PrzW3b+amB6QlkEi6muvTeeomEvvZXqNZ9U/fJBUL8RHLc0hbTnlp0+JGP84Ko

sjyfIG1uaxFnd9mgQ6JR1zGsdgPBW7ljZ4eaU5S8a4
gadNHwfQe8IEIl8qd740Ehz3Vjj2i/jzUg7R0DRlELtJ7nRD+kbtkEvaeeMObj70C9ZMqdJyYXpfruNw

rOIQJSrnknPTppXDr0AsRmK4v+Q5N+hPjvzp11vjkAz+MZYn7ShLkbmX51CAzS8CpBgQtDLYZuyuou+6

aWrKoBi88t8iB/3c12UYR6OXuHeb9M/AGfuywY2f+6
Y0Y9/vCj8ioqDg4c0ztpYhMvbNBuHvfv1NzXtcU/WpQulpl/hJHFSD2+j9d1HZ6DPUf1J5lNk6qjn7Bz

mVH/ZsXQjIEraoEYc2JOXYLFoZeR810KjCKLDLPw91yX638mwEN5HuOfqhsKumis2J3rHSxQt+Pz2fb8

cwK1D19StwVDOpTqk2uY5nMZo1AqqNgGXy/dm1mnPB
Qez6WmoIaGfZoovF7Gbm4ElhCUaDLABpnp+ZST2ABstvESIn5IjMAISx4Q4fvz6k4uHLoBRVtnqVUyhf

W466mPwzy6nL5DpW1xumaAXt8NI2BtfSEr/MD8Xr+eHU61Q09jiRNOLNkE22w1bMS/vr7rOVnP3Jm3AM

C42nXvZ8pM87aXnxLUkY5pbPSIjx7twudonsdYFZEL
goVv9ApAUiB214DCgKA+wVakyHLEvKJ9TiaTUqWXm9fZ38cRkdLzRWOZOr9RLUZtBzgyDJhITRLneLcC

imO5bgnSHwW2L+4JdDFPSxkk2mVQXAOoJ5HVYG8k5IfZwIDO+WmZnhKus8DVyBiqPkVYeryxEcU86rfs

IAZtgMSdj3BB4mYU/DpwU29dd8SiMMLcm/TEBGSe5g
A7XqQ0JhKcoYnDgYCUQwO9H6LoFWedCoxqaM7YvDyPetESCrJolFd/jpXrwYpFaaiCToEWAJ+2lqdDbO

lS483pdU0i/RIfdibQTofLgD4m8+INZe9lcrxMGP6C+YNa7gbv2Uw46NE2mSBgxChL7++whYrAJBg/kL

405H28rBeGyGY4Ol3ClN9g9HAcppN61QmwfaMiOEhY
7SF44I4S0736QRmbF/8MmYSSsJH1OfX9veoquRrVp/tU3IJKT1FLe6RvVGYmA8LISN1VOJvDdwhYeAHe

D8/oz8IQdhnkH6OviHPhlQmk8LGD9b+pF2JaZI83K3XH8BZJQiv9CxzcWoWZo4ftCFUq1dwRnMcOChtO

8j9MC4UETZCkpoL1ur3//oeV3ne0rAey4J5y9dhKOH
Hlt/a7Niswl0wyyKpzQls6xRdNCZwvtAOu/dg7bf/qvAhlqdt/OPBC0g9ianGT4JIX83pSO6SGnb+7pp

Tfugn7T7JlLJKQrP3Ipo5IN5BvXNaqbvC2dVodOaDhejWi87pYg5qbT4hJ9YVgZ4G7mKBwRHTjc5F1Md

gjydhljI7ghmp8TMxbAO8J9xBfO9biICuqnQCgj5Jp
OEKnc6vmBQ5f/sWSPQKWZ/omhcUpHKocrs4P4ysIpSTQkGnnBJsXWhvO7/78wpRDGf07f1WSEPcx4vwh

PqtP/5VcuLE3Ko42BSPqlP9rnFF31qc/jT4hI880O1MM+IxeqBkQPdZmt+6TSmTonP1U0YybsP3ae2NV

QWawO9UsuC8afx7IUqtB7OdCnkpELEDwJcBQt24Qet
9mfhrNqZHqgVW92BbOIlWtVdSwAIXpBac4C8ZbQxg2267CQGtwE+mtYWNSkx2m5ftEf5EwtMBZBULEuH

qOjX73i/s/1U+95y54iGzOdQEpEX79EJX4d9+tw3fjC4e8BfGXLtWyKT/hhjViTwoUDNne/NJrwrJtig

qZQCSqmf12IB2jWVE0u7PXHR8LpgCrbDaPf3S0jhCW
TtUF1CuHtK/kacdY2GCnVNOgMy4x6s16MYVWBrkDF2a07WS25aLSC2hucmWZZLftJq5K2xp0/XZpv39e

h3FsCnivQBY0t1TU/j8N9+MGt86pBe4P8SwUF5+SbSkI+302y/QF9guP592EuMbS5mp9V1oypT4x5BX0

6wBT9NhbX84SgHIoGj16gLEDyZAHpFuxMvC55kHcHP
R6tJG6Z9pc8gcuppHuQObFPgZ5VC3KanaYH6RLw/WJXyAS5Uv0JfOJ4Fa1G3bUfoFvtvY7Rnm6Svfs5o

vGtrCLzySPmYxA+B5vQ9SeNMQ2IHiohBo92ZrvKk1Uqg/nIsK2ab5LsssUlzPvQHZG1ZDTFE3DK6G9mz

HxjgyN2r0ublN0eFg7xgVFGz8BS2x80CBbpZtB+pow
dwtBu4aFeCHwxM0yw/Mu4R6U3DnYoR7eWtDYEK3kzsOPX53WbyBcdaBUUQxebeGScusU3WWymrZkIa6Y

RhoKeiE7oeGqx7BXZSxoq+d20b+mfP/anAXoEfjKat3PQMIeFFciyJ0pF1+Cozc5T/pKAmZY2hFUQk4X

Pj0dCZVpOwdPeJ/dEBsZPygKnptR1LmviRJpvQUAcX
k6okVINLszNKnUc9klkVniw//HqysODYnl5Aoee2GZvsWSWojfiGvu1IXSaFZNzsOuuaH6nYkDG0ih3x

sDmE5Gle7+fzaA1PePjd4MHtyC3WQYvRDyTfj192vDWvFQKjQaKsXK7X3lU2pU6FWKwjkSZK2DrdaMXA

7E/o7RNwXtOJHMOeT79kAg/+s/Jn6Jn642bOfNpvKl
yUDeE2QRptfbc3tBVl9/wI5DqOl4KXHgfCNAW21BZR9tocv2eyx6spYI8Eer/jG7SGBgF4fS0j7WTAEA

U85F2RJNX7o4A9//qTlK+wmRfbOO68ZwM7izxc61W9lE5iUWnmydmK++rTOBfNEAMlR89ECEKFpqT41B

j1oH26M602PYXQEGlh6F0I/ZOdb/0RPETU47FySioR
OrIwVTDPepZsNPdH+PkIf19AQ1R6ZfBroXfhbINqd5/QqTr8ja4kfDnkXPw0Uoi4P9bG4hN9d2acXuGR

aXy7XhHhcxTHel2+Pigjg/yYVbyqGODvEjjfQuS+Emxn90mlMu3l9Z2DUBmtkshuRF2IEonoBmQDLF6L

E8N41Xjf4klIAd1fh/l5kSTLTD/YcuTrfzxGtocBa2
T3XJcsypiwsQ+0xgzV/lat1FaqthKDDSdNvY5aadcg5XsdJUnLkWRpBfMJlqqKqO/MWbF1WbSn7TltH7

XyvBD1T634nnqmkfr0q5w8v33ws+wDD/w4t2fcTOThSnq1jPh86EvjDg7HlIsP+VSL4l8+KCRDGIOkRe

Wn+YSTMO/WTxw7BGzSQ8YLHepVC8+0/GmTssXavqrV
Q2ZU+dcANTTm+TFDOE4a26S7xvbPYyKox7Z8hoUmV1r/1gTR9NaaOs9Ah1/anxoDgYrz7dTjfoac5h4U

rUu56oiS1tMtw83WONvrzOAcCQGvm5awttds9b0XypMaumxld6in1wr97SS2R4u/h25F8DdUFDGn88eQ

jIqzOeI+GPL14ocMZ1QYxsuQKO1ohEjv0pAH+XNhyU
hIs5aU6wLbzCXFQT0lXtpB6o0RXrFnfjBdpJsv+IErQIyBfzq3aboCZCUNROMulhibD1//DpyFT1A4Fp

owqOQPSJ58f6LNcIixRMcv7XPRKfYKPLRPmT7k2/LS38ZLSQ07yGZv8Ms4y4LzLyPtvLfpj3RbdbxxJ5

2ALzFROzVo9vwXDA9RnEMKTMYC3THE0455+y3lihaU
OstfE/N1lbk3SIj/dLnbwSBhiI2XlLGvyXQOVKxI8bjhNYdD7f1HXy+VJTN/EGeH4l1mUZmYfMV3u2Cw

MyPhVOhSn7jIqtvBZb8pYihpRekq06vdOD5gTfhym2wq0mknT0lzEu5NWrw27AbfCjo04Mo1PFfw1Df9

s5mTw7uPFrioHyfJsUqt3tNTdobBRTt4DkW6oL4/bx
o2M9kw919OB4M0J1x/5UCYmHarXMXZkACiBt8slXHZfXeY7+uzd1m+Q5TrECnM61Hep9f8B12NW6gQnd

XFdB0kfRzC6LCEE/U/0RCX4x4Ffmaq2KEpkihD4pWtwPl/vexMayIIMzJdcb9nYRKAbdQg3MClSqRdyW

29YQxXFAh7GokR849ighMua2nTmcniSoLiZ0GVRBJY
Kma3YR6mUnUhdoPsv2ZmAE3ejnBfYQb58gWqBthxceGv0MYO/BoVHQKhoElgSkoek7ZPIFc2DWng6+fS

tJ9SVPIA79wMr5a1K0JQb5IT/CFtgl9LfH+YwLHRg6GcatYuNy4+vY3wT9qse4+prYAdZeG0EdprrXpH

bryan+RhxbO//d+1eNa3y/gY9AX7uzGnN61D42CONlh4
kuenJEuvfnzbuxk0X2nJnyv6jmEDGcV8LSskeYyiI9LMmLeVRR8VmlepsiVqmH2vyIyg4cJFHykPIo+I

9l0oJ7H5ga1QxJCski+PMvk/U/ar3YpPrnFk3nw50aryqpYginuuTjlU0BXB6S3sqyL94u4WqfNV0r25

Yhnu4DmSyjxBwNe3NkHZ4Nb8E3P/A+Se2Ia1xsbY3A
PwJilixiyrVBIilwyYRPAeWKAVIzPKVmrwErhU+6In8X9r7cgKDwNDsSwyxRTWNoMrk4rXYJYymS3TQr

dr+92t5PoBp96t1UC7yH5ggkm9vI4szAKouLsgmu+ZMGdq1dJz6wIC32h+REPMeSF7jxz2IUx2gxIB+0

l32eNxXeguQYdjtHo1P0e0hIonil+6nKoy+rip/QGN
tYYyMW1JXjAgG4NNrzg/c5YljfixYCTg7ZECI9DuiPWeszrl9j2wHhWq10jrB3a/SLPs8AKrAoxcY47K

LZMCpTGKW/mDLJYuj+/PP8xqvZadhDqW6XxcJMPXr1f+Yv/UE7alzFeM61NCj9pBSsN0bj9683yNO+9X

pQ9m+4LGnCeeZqRYEkMOxVOemOSRJbvBni41Hi8aoL
8UI8Vr65wkSLrjkSrk5sOpt761sDDJGucUW6wxpp1B75G2mIOZbd2/mo0rD1OTmn/bv6wtEEXfIYTMmQ

HQqT7YUpaqHSX+kNq0gIJzrimPSijOam+4TNtYcsJDqglGunRmG/jzwop+S+dorXHNW8Ca4N6sNnWxyr

/CtycwP1fjRmjMR1dA9fZgimmWo87s1yLnWMBC0nmn
q/mu7ErpMptqOVrRcXZpIkSumEc/n5Awcs4nrudU8Dr4ouHKVtpiegj6RgbRI1ShRqt9EJfIm8F4OTZ1

IAsLvIIrC2NrVnfuCBOPLfX9R4N//B5ohlPB2dMBdLHp6qhYyzRaWdAxO2lnF5nYkV3oxF3KPTkNEkCU

n0b/0G5NbZO3lLfay3tqAwaA0KMP0JnbLGJi1Ozobo
uHCpnnml1eYmBHAVhDIeR7bNGDMQ2fag9kPPpNXsY+lRdT+bv/L1z4/OZlJrJkAUaN0WxbP0vKKwKhrG

0q0SSuabjo5scUVpdmjU7E3AcFzSfNMWhPYPhB3aPBh0pCml26Zb/lyXWnXQw9o0mqrpqMIFozO/Nmhk

1YJa3+yUnqXVpr8xlxBVam+qgLz4klTNoVAXqmCjQR
gPviKUtikEic37OzJnNzwpjLcAb+2JEj8/2MlnPIPnQgxziFqwMKDrSTw49nodlen817PBVFGJxOddcU

9cUdZI7tkgnf6p5b0tqRc6/kcE0XlXSBMOxl835sOSN0+Qd9k2PSVJcZGNGmiU4b3K1zoMEfGPm3kJSS

7VOXkCsfMKb5SnavqX/LFEczQT5w1g0TFdTDpKq4yB
0wJEF3x4PEwmOcB92CHxeY1+q4O4IrI9kxi94F6Ogs6hCr50flP/EDU+5+Ngway0fyyf6pHp3DAR46PC

iBCTmcG3tfFopzeYuv41YVjOxhddP7Na7u5EO5CZ5X2bv+2+D1G/M0gbSaZQWGOtombgMjWlQY9ahynR

pmcrjxw0S7gzE7BwiaHyZK+wEAlg/uek9VU9qew1Yd
FrhttXKTOUTVsrViuixSCrX9wBC0g7dWhBkYzgRnaR5/jMZTqQHXMnz1rvxBpq0Hvye31+GYapcsEDE7

fW4BQg7ckWWIstdfI2mELWELqmz/8Fz8e4DLDS2aCa/4a4wzhTuEP7EF7FCR68Tw+KW6bkA7KzyaChPP

ynk3ESlcPWvnUduOWvdTqlsThxo0z9REk+ImAEPMRS
ysAQ3LznJrvyWslO1oYyWH0/bHFp9yTjSIADa5Q8XtKx6EiaJ/sNv2JaufG3LgvegOa9Js45pp3b168O

KCwH35lC5xm+zyHcZ0UEgLOUIO53LJDUaRlrusIvFdu3+9E6ezzSJKMXbqrRwDkKKH9aKFeuwbL2tt63

ErBJ+PRBSKNk8iX/uW+qZ7rM/3qgN1XwO/7o0Q9xb9
Alonso+evdvrGNL4oyxz5pyFhLqeqgD9D58nXKz9ydtUTaFk//59u7rQ8eEu135t9lOdVd+fpDZPhn1xKa7

XZ1sVs73l34ljpWAHhKm7GKfaDqDeVUcz+1VEmU+vKYUtMhyLdD47IBeItEnRCwipe/k31TnwTx32OXq

mDzwuVEftFA20h7BBqpaCDHyMaKj8McZL3kghDN2ky
M/C348id2D2P3h9nSCw//FRPs5e6Cv7j7OL0CJCC0U9touQWKReYc90JTaSPSxc+WXQcUDzuY09YqZ0a

lO1NgykMRwws7w5HIz3Ead29lKist6b805gNfIjTo2ba9ylXIWWgpYEhg0cpA2DRYZL8sAq58di/9OKz

G/A2Flvivt1bBMx8QT68EO0N9B+NiHR+4vyvbbvpBR
RTHuHl3NvokviZ/FP2MPZWXTANZKISoeS3v3Kvj0uyM5nLCyI4yV81lq/+U/nyZGavNDK1x4CP+u9rMT

Jvu5EP7vP3e3g0E7zH/f0oe1gyCLTJtwNMHyGV7HggTOvsBq9bTbIsEO+RfF8bk/Ko8CBhKNGzm4QJLc

fFKeoCzE7m2jn/557iRaIT9SQTC1wKQzKZiWr+W/UV
Gwd1uFHeRwNjs4LQ6yWVgF4hDcKDVzbvV6YVVTNvXdkMiJYenEIEpf15rKrQZDmPjtfLrbTCT13Ro8ve

lzsf7iQCE08hwhWZxzB/Qgra9QAoovTiNrv8/jYd3zNmgqA1AyU8+TQ3oquZLGHtUVBukMdy7iVYKDe9

YEk4KMZR2zOOhAKJsWriS0W8+v2wtsdRl88270oJPR
/m2waXXVcj6zH+YDQ5ikh5bDzj5oPGZw0JL7vw+BJi0p6R8Hx/YO1eFme+pj+CRfzxGVJLlgkCk5qhCv

xFQbmHy5VT9MpS+Q176GJAKX7KTZ/xK0ILf4185cFvo+nlRTH+tZ44j2Wp/m+hoBA2o1CEPJl05FRy1T

muZ36J0T4L50aA0uaLwY1+1YZQoGvrQAm2vXBjHxdB
Tz/foNlqRbDF2sA1EDvqWtm2jxc+GEbo+GrwhZAGHsrbY1exRwqz2kAiMG4Fa/RyrrmxYdBfyx8kNNZz

t44gsyJo2Mccl0RatfAvFxA0I4Zr24OAbZt1ge/JzcOjJvGIQpG9aU30LUwCRZUSBcdjXGjlN+1Ni/mi

sDOU50Q9PQJsQ5PRpIXylZjf8Mj0aPd7CgczQSezmw
nxrdsZQ91Rs64W1jFx7H+HZMQ8pao2mStH5FZKzlWeuKvEYCS7C1+bMJGQuJGZB51A7+JPnN2I95XNep

ojPg2NWwfkgkJ3oYRzEFQsY6LKYAVS8t9TDY9xpd4MxQCExpjU52xa3PXl79o2nGOM7nfNOsVeSqEOFp

x3FHPTDFqx63DNgE11ZoCVpJYxDv+1z7w2JR60P7/C
lDTE810aGFGJRv+y69SeSEEWPi+yXZZhRq3Pv3z0QQFuQA9UZIRGPs6n0U7OVBh//5je2HY+ftplEvB8

CR8knYH7dSoIsp166EpGNgUNCL+OicMjKEkcPZGM0Sw+cFI6fX9mMrQYcdQPPv+m1IWvCuWmGBFEJjh1

fdBDUDhlRfc6+of1+SH0YMsLAZqdqEptIwnzSqDURT
dvKV5b/EwEy/Hlbxl7VOJ1rO6W7iXJjUEcE90GpsQDwyffo1Jt6KL6ZRwx+5GJ7NVcdx1OINlcAkTkPV

yJvNwWLMyjs29d7rjjgnIawCJPbMpLba5qN0nQmNNZopqImrDnaVL0AdTKMLHnVOzzE8Ua8sBp1CMffL

6DHPVrfPJqoLTyFH/hYYzeypTyh4omg6qiRnbKc/u4
Az+UiK3dJES81XvzSGFekKynsAiD6gFgEOJPOL0d8Jd/gy/dDffO2jzv9CnnNsaUN8krJCTLPMeck3bW

Cu2GzfSZaibsdk+Q2d0pWlirJm0AyTAcL8TRs7xYLDlitpdO8TxlCR+iQdjUqBTWDztgn+PpG7+0kZHz

7aQpR2AyQ6LZUfGXMN0rmUaMhB/w6UT1EXqdlYFpFJ
kNa+fbPFvbD6KDhFIFiZH9myhluHTes1s31ogZnITttOYTeY2Sa7HG+rxnA0cQq6hB241yBcDj7p+cXq

kY43K90bipnNzsdP4k2IbrWw3sQrlp4fMDUva70c2tfZMzUrynJ7ELh6sU+ip1FDY//lfnw/2oJgOTr6

IflwQzwnw2HjvQGbGzNtX/WpgvesmioXqtSpOVl/WK
/Eh+0L7mCLBNCCmU5Hp67DVa8RPsz7DVjzirCana4MhvwCEuv8M7Q7jD2x+TkJ27YHLE9WTtiOD3DVT2

HxNbWNQZwz5uJPjbH9dM3L+r0vZLruqm0t9+O+8vpd+Yp11ef/yN9+goXe3tCkRdzAqpnh7qqKB1HHVQ

ct/lfMoTW7jg5SGJP0erCtZwZEDF6revelNd5OUlv5
hfae952juolG4qpOTLozLEb7YVnIfhcD49EwE30qWxFrbYMvbVwtpvSzPCcdOQXRQzY4E6vbQJpszomC

YexFRw0DdSyRT/VMZ1UCf+LnQRJYqLwRn1lnWZ0wjIXn701napLLTbUSR9xlM8O7TNq6stgCmy//pKlr

VRqb2k+fl2kTAmDo03C2iUGGfZHlr2sTYhPPJN1tKv
DFFnGNgGlMecwn83j0I+ozLt/qioyFBgSjHaLcnWjmHVfGOK6hwj/Vrfl98r28Xx/7858P+r8YT7B/ZE

wJ5uOmiLWXRznZlrsURzluxNWSGQ0bkE7v8gIeC4UVzLXesoPsp/J2cxJmsAtHtzCN9vnynnyWII1H0x

2ShvcrOPFrTHmBaAv4l+ftbbacJnpxs41NNPfwb1ek
3JAu2OBI8ZXylF4PuvkmDX6t41SIJII0V02CcN4WYrmcSLH5XK61CbXGavMFzeVuAghsKuGbxG4wvL0w

86EbCAx8FdLLeJkr7A+/cY6euZSr3CjXy/Uq8hwngHOtHMzLoy+oFqg80DuTHlF2pxejm0QopLOBHcrE

Bd8eIxd1khcV3W0fGrnekU58LS/v+Jjss08iJq5Vt3
6aKmd5wfFI9+w08V17IRqHen2WDNDoudPmKkn+7+D5IWKVEsxtOYhuOw9vVUqssrrm5TULCQx3jG08iz

J9AqbfpXtj/rP2UegngcL/snuq8zLV6oQ6vVuidab06JhBKJJIFKV9tbqtB/+vGoOn1VA0Doc+IyZaAz

sk05pdibyg2B4qiqzZ9GxVC5PqNWhqVZ97rqNwHf+r
8eNeJ2XSocNpxG+cuBlIZDrYg0lS55uWV2JNsOh7PtmUunzzF9IB0eydLO8MAhEbZVYfauV3ENtO+sr7

XsTXXhlGPkdai1DrrW/Gby+qLLJohc73ToGcC07cvVUI+MM9QolpVOs1/zh50eNsBsQsrWKpIp8dg0hw

ERkibYrkzHBzNhUrFSc0cj4Q1/176C/6E4aIsjILiF
seble/2aTHDVfnoC5n2QQvHneXsnK6BJyihZgbIiRWgoxPU83H2tCannHI3iq8iaMm6zp2vw3MIQ2J2LF

BwfCyKx9362x7FHSTX/UoDwzhDyOnIMzIJOk0p1Za+wHGBqMRF4BrrulA6TfnfE8advuSV1bG4LodU1v

RKRdwlwpcYZc6N9u776KByBK75NC1r+xMPwdfPf7rW
CHg+0f0+NRmWTeD6KVW5dy+mD0Q2TrhA6389Rx5u0wssVdmklyWqM1/ElObHO/so6k5o737p/8+81RDq

ywOt9lCDsZTB90amKCKRPQIIgr2+t2MHCzKyBJkmm62HTUHTaxLSPOjuw5VxT/aeJ7QnitBm55vm2uA9

ruu47Gy0fEFoL4273bP8j+4/D4gxL7n6mrY/yS/Zal
+FZ8F/itpJISiBe19DjFpI02cNAPUnJYB+Mckenzie/Xg4rM3k2ne8D6wxhlfJm8Zftc2+T9yyPUSpAkzOUoGl

6bHDMJWLW48gfMZayHO8zeiBQgLvE99aDs0ap4VB2EtVXlCraPk1t4GTONzmUnUlTRO7i1t+k+iUDVls

jdw1biCUCb5qoWa6dZtUA4SeVpfk15TOHacQxE4Prz
5XRQgLvhyztCPNMF+3bTC5/MqNakUwjUpe8rhhu2A8TzSqXoL0M/SNudosuP1s8kNeTuOTRrY7EKzN60

NlQb7Z7NKGbN4hQfNnS6+hamconnof1B96phBbKTJSpaNZK4fObooHXxHLttbnUjuylSEaKMtWagDOD3

9VuSGabV+/35jHEyup7376mQe2isFl6580ONfKNpbZ
Y5bnDs1Y97mkooEnAnpk06r0TIitUh73RlEClRpnm4xCyoJQAkmB/01xASo2DDEmKVDFxLteV91PKVDG

tcCepziyXVwGCvDvN8fj0oERF+7dA5wIaIZD9+55HfEAB7ixMEH/wZO3HFjEJbXWlpp1uQq2YpjnYYrY

MJly9zyFkbaXCxBCSMlyP96ciaZGna+R1MgZWEpcuD
iTE7i7n/oagsGXol9uLutRKdC8a+b7cs10c8MxDhu6CrqBFxwI4pnZRQwR2m4DPAQhvg7XEylcfjcwwD

f/kMaCK1slDfUOZFgRbjpwmRJjc1rlFO2Ab5PRtM6r5QRILTO36XM/PLVziOwFRpPX273f4JUVXrW0J+

qxj/V7osvqEtOTL/M/Lf0R/m++We9pWRVcrPJN6UGk
P2sLMZ+9Tp6kDSlhCBtCYbyjyYQGEON6ZE6IGYFELYWiRgJ8vqaM3AUjN+3otpN80tPz3sHLvxEEdoVu

bfB5eSECElS44dGNYJMUe4zgClUAuKgWI3OOhczmJYKaTyvZfYMhDSBXQCJZn3cD0sXHXvlfb/X6noTx

YxTq/rIfmASN9EBFfEbd1hniBgWEaPqiDfaRg1zTYw
AkVy02D5b50DiiW9nNlnpLXkfhjo6eLPqn1yRVsVnbdvW14oMy+fwcJa2QbULtG5pe4OVe3VyMRSnQPx

jvcgLhUJj/S6TgJA5Zi3hDJT+30eHUOM8syA2yhatnSVKLHE/9GgIHdsTH/DvvpSX/7xbPPcydb6J4ku

KVt2c47P5bNszCH8QONAu2oeRdwjaPkC0Y1MrYC8s1
esed4aNjhroTSQDtl69qDrp0Gg1oC//PhZUYIRIq9TOJEYMzJA4xAfezIWwOeG3d7ggLBBxlW9x5KTzz

oAv6gkZm9ML5g9Ko4KOjwoUnUAIrXXf8jpfvaR+HdaPi8r/CHxsBqqOX/SSQtcyLq/df80UlQtjf337K

2ffDyQVBH+UO12TDRz8nilVzZB7IY41VvVL9KmtQRK
I/c/3BOR9JzTg2Fh1RsgVtWaVM5qeuCP4777EKtp48s96HOnaNhNGzZw8B1urTkzFFOL/VrjQfJaVCUQ

YqWmTPPRRyERglBWzOi+p7QhzYTKA0pNqMGgG/0iUULOwQuAgb4M9TPLwhj41oBQFWry/PvzdTGXgEPH

e++XsDywENfJzPVurvnTd4++JsRKCkW6m8YlAuL/DX
6Y5RAngX5dRzimWfKojjDlBd06Bd8h2N84mwwRie3V8PyMFofTKspaf4vItAlrioca7p7F4nJVXxkDuy

Dm1lJs9mKozZm2gKu4ei8YLudpWtNw/mORPuS50RinkvOXETbk8O8lUMv5IGcDkOmaWZXHm0OyVN5yZ9

RcQIC6+6Ccc+rPgsTdKWEN1SQ4qk/yoiB5rh8wFfU5
4Tpx+HrvNWwC4p0Q8o9dnHa39ggfkTom6quuDKjsOhUs6JLdjXIGINGU+UrDWPBcba3Kt+zcPMpCOqhu

OyYOE4u8suXTkSctuihH1szushoTFOawVygpLNtKnGIP4EefsgRIt1jMLFw/jg1ooyaaoOEHAef2mZze

mhGgYwq7fK27IJ5aMCIVXoAbShVRoEdJ3w30PbBWjx
+Qh0vQzbF4lo9NDcoUtTJZfvDwGHRBe+MIBnYiDvA4K7oaw+P4e7W91dGMuZt+xF47AVlAFL4zhnDi

Up1UCC+/ZES4rBtuVi4STlnQj/04u9Uw457aKf1+RGM2l2cde96FSpYK5CZjyaqxvPN1FOxsn4UylNwy

vWNmjDuXws1kZ2z80SMb6CY5bcsperc4bVYie9zMlk
XFXb67htdZcvnGM+1eYjppq7+HZ4+BrjTxdzggM5b5ufeWR171/l1yUJEreKnRgGor63lB646rJcMoc5

o1D2Nt7pzuyPMrPi18p7POH7owHgt6NsSClOTpXwWvnA1cMthyx/9gdE9zi/6LR9FQvrk9OVqPi9ASC8

9oKPp5UyLiG5ctEt+4f7x0E+BfJXOqpqhWz8omyvsD
bYVVU0faVKodGbfVQfHiNHbfEzNcYQOub2/WrB3ZTDHr6PFbmapZBKUdaqXfMp08BXru8LJXodtecPqE

4CX81MqJeVQ2ufyPQkUk27JNMgF5X3YHMd6SlBBLTLHUwcok+X7lFnXkJygsRYhzMeif5ZTq/fAjZfSy

aai5tgtcDF3TgHEfYLQ10ORhDemK7PbjATRRzFaj4v
Zf9kP+RBkx/EbxkqtnjF9cUbm7BROjQ+nkyirudHvIuQJXsDlqz0ABSehhU7QWgUgGvP3ue4qwvQIdRo

s0RgFh4Gb/OD+eT0D8VFJFA6FZ+jJkXuS/mDdckpy8oLMDhC3kizE+VVRUtPXyz6ku9ggv4COCFqAK6C

yt0UynHcHIYaQAi0hHnk5xVUyXF2XCSJ++wFzt9Mk2
yMHdmtlfNbuYp6M1opZTiXPSl1XIPq+/gQkZvPzFFZOAdgCptCvVx8KG8dGdQ26n9XDdP6CLNSxYRa7o

LE+MREtYqI8gj7PtlhV4hxU4YojiSefr5NVNziDhoEmUYlxbIBiM2stMDS4zG6/aH8QVrPCD6FeKkNDZ

aMMuSLW2Wox87G8SLnF74XI84et9bKS2Z2qDXvcicT
FK+4yvNJNeCkGfR+DsveK5yie4bpsFhCgaO3qwzWlG2OwvwA15guFE1cwCwBgioCVLIc0OC1aRR/

okMv+ux4MmuNGmY9AYrk04GxyfjsKKzw5PRtuh166ARKl1ySi9UsWC6Tu+wKswB7I3S++1c39dKd1JXm

WV70gnHc3ROLyOA/Uc0v1ez0pDXRU437uYhF1RtLTy
WZTuOuDUU/pIa3jq4yTXyVsBrVxzH7UQbHT9TouXMSblDmvVJYaTPz6xFpDIpFWHy41VlKvaA3Qmp7EV

OMOSLUInGN6wpW3u6MqNPzYlODxJ9Hi2CREcaYDIPbP+NWJLYv5SFrxlB+r6x928kez68BcD/PXIj41M

18hd+y6+ZI7DuzkIYETHAodcgOIjhTY9vfF2z4/Vhm
J4uaQE21Xw2kp/fppfL8Nwj4Nynaiaxmy9BU5Zuxb023CrJVG99Val5fiOI34v7ViWYdHWIF9afRHWyk

QMb8vx1BFljGP+OcAPnveZcRlhGxhgN0myW5UbtAmppNvDblv2nsCvLrZxuKl21Eoi/0BbEpirI/WjLU

IstcqmZoEVuExVWcsBy8xLPkrddCMPt0VY1T9NxQOy
lv1UdWMJ5PbemiFK/I3msIoGZhRB8VIFBLd/xOf1RUYHO3IYEtP2W1YJbptnpPL5cNHeDvZhreRQTOly

/o5B0dwzDDtxMuhgIiwTMC1BtphQfgNhx73+eRLEO3lj1VLdZt9uN4WPJcIV58EdKX5CIzFFkxVJl5aH

aGXpIfkwlEQ/dzWlnIk80KJ06ka/boxLDT3FFzvXLK
uE1i8A8ZZLHJoyi12LPFaFNWZUxDNDQNJnJD6Rjv28KrcqnyxiX9X0aGL4alpX+B+8hxF7kY3bhd1ZzB

XfDxcIZnb8V1UsU16Sp5paCdtv3LtshADS1CHs8d8VhrlSCDlH+en+Qb7+ts8q7xZzjX4zXcoPvWLyph

7pt6eZ1f/Wwk/CA/Pi8CfeKuMxJN5Dv6nGAWRV9WLj
Pewduxnk5InQao2oAOyKY+LLbSd8vN+/RUO4ZqU263pURyrXdjVOFFL9YJwJ9LPVQ7XvCbkaMsFf4+rF

mQSSsF4Sq2COBQGIlB8AB+5Mf75cjlKkgMFUqz2OJvbB4sAorXl/oGrT1wWjeUgj1nredcimok1gbsfa

qUVdU4p9/HnQbc9Yng0/6lzGw4T8JcJsVF41QApnef
XP3Mz0R1eLna23r8bdQowckIh2C9n+6Ij1Kinc+mGHk2oO6bELBRPRBR1zQXJgWz4cQeWK/KNP3MbFH2

L/AQYj/Aet/ziDPsyWMIugZ6671DeAQKywZClSgow6ceFjiXen8K9p+gcWuaTquy2olOtBVgdnNwc3GY

OAm3gW3466GjZiF+pdHG1hlTDIiTRWATVO3RJx6oBE
rxx+Crze7xn5CStgy/nRvdIf4k/dVRE/AuDv/Pyv/UHZd4EKumXP1WAaG3r6xmqRwIeFQSe/0/oeaNqn

pYPVGKyW2KXYJSqZKAR+w2+5EVLSkl/3CEEL4xRLXsjsCQqvcRkyfY2nIrhVR2DvoBzmjZPZKcvc0BQg

2+pDAa48FTCPgnbQOixgqJcm2odhIV/ecwXL1776pA
0KGW8r6AUbbsjbsz3zKyqf6ZUaNGzTB7dHJPVoGZP75RBQVu3w6x/Vvoy4L/KYps4oX2fqDim0JMlhnS

x593oY7IU5VidKBmZLcSX5pJy7D8kgtZgaipBJMHoIrVVQy5oX0Kkma6CqENM3Tz4cefa/dZerhOVJ2R

LRmAp31u/rkFZWTrSc9Rsy7HwJfJKoUl5kWpq5Kjcy
vPBH3oBvY04VkPlm+sgoGFpDM7W9ZqDZ+qVWOFwiaR44UlolXeGzXe4kE3KpNdxIwIQ3k6rLMgWMYxZ3

20d0XozGcjq4q3RpKk/IgbXDFhSPgC/6t3U4bjl+Q6lwxLCIRLA2Bai3tkPiMe5N1p+Jr3keDtBe5WY8

HnaU/dZWeT0sUEAqtY6M/h4TsDgMr9MNkEUiDrCPSs
WC8cpvxeaFq6jxmupbkabAO/mXCKbdrldvV3LOx0zC/2eG2gaJ+yyrY2h6C+WyKfPhy+hf7oulkwjeQI

vG24E1LUUWGkQohCaykIDeNXoZ2Qls/k4RR8b0i3j/nc2tZvDS+xfVbZdmOybtH/P93G4QFcKRdl9orX

cKOHFG4AD3CYUGEr4rZuYARqcjfXWWCZWk47ICnile
3abz8zr3OnbBI5JIUSbOes9X2G/eENadCzOnbg+yTLn8dHusYLnyMFVFKQVVPofKBgo3q+daZOI/iV/S

4FhOKlGhuMAahY1DqmTxWirY8PlA0rkTukrARQZEkg3TyhJNSzDkrqaOEWBSADKihkuIQ+ZIBmp/r64A

Tw+FMbhwnqO2KMbVey+g7l8PmdhuQEzyr9PPFlOSDG
i+7yT7DbI4G8IeC8EHVShj/+XjPtn4pVy5gmZjHPAsjag86BnlEvjbcyBryh+YIZO+t6eJ8h+AEIwHFA

K/fcz1GbwVzPN9dEeaFE3n/R9pwXRgRDiaKebd9cU2hb6DkwEouMiQNtuwdWKfVa4payh5/PzC7HYAEm

xkuvuF/N6Oryg1HCVCxpgnu8xevqdJZSnFVlgcfE6w
l/oy5IHO5fz82I0NZnoJ5LPkA5WFD6Cz116j27NTpCXexNiBZLXa7FIIhmSO6XKdDJAV4VLHJCZZB4fD

VsBNFvpUX5EXLBPCQftF6nchX6NvvQ/MVpm7GheSr8A4uTs94cShY/xmp749qf4TL18TIKeQDD/fBF5V

ztiSjUFihtoCgitUigY+/4KdkKO2SwUWsSVbiHvtQU
aXmA3AIOlP2rK+8bbeXWcgwd1Mepq83OqpiqpaFVGA+jIDL4yOD9WpSEUrN6Cy6gRPeHfS2WrbOJhadT

HM15KH1Pj00PezeeF+2gBvnf7OYVueSDsJBuIwBgRxRSKhtZYPPfdYBqnvazhp5t8L/6wEG/6M7RzIhX

zOX5cW3ZDFVUqNv8+v3RjI1S53pBSoOFi7lpbsmF13
AJToLl+r8V8QxZWvB//vln4KlrKhe/A0wxBM5wfK2ngKTpNITLok+d18J/KaGonukSYVzho9wpJzTwAH

fKdwtaAaisI1E/42L+b9g8RQnC/T2sEeJqqJTUw10qewrTYsZntFy0G41TogQKvEQ/EVO7XjazYVJ/dc

C1eUUykgu0PQodAaxISBiOoj+ouI9Y3HupQ3T5t92a
Xcqg7ctiY5fgqED9c6aDbyKWnz4j22c+SNezQJH2mVSfDNzjdFaQAJV9gy6e9lbjLSIr1R/1e8xXpXTP

Cow9BW7W7PbI4u9HYkAA+BXtPfRp8Iucou7+hwR3LrcE2floQt9F4cV/VrlTtp5JgY3RkmfyWnXUmoZi

TPW2s48eggFLe34GFbMhR4Xfu15f3IfhMKAR0MYsRK
9WVJEmEmzxJNN5n1xnlwIuuGZLcJ7JHPPc0ivjOGUxUXX0sqyxZJbwhHZBh6E78thLyYxvI7ht/xv50S

oLsT6bkKRaKqzEiV+Yhs70ZR/RjvmZVp1p56LwLcaUCeKpBsITDSe6iDggWNK/K8MORJpEpfx9iVwABr

IZfoaajd8G4kZlRyrwzlHdrnq+nDtbGrQ4W449aRGf
3f3z7eifO6Zt7z/MBXLY4l2FHsC5dyWVc+LWzbN+DU31FGhYerl+qTRbgBTB7bH7Q9vTKH9Rai2M2jZE

BAkc51+ucdqP7jG38RxR49pA9520ES2+oyDXOQvVPjMtJzi8JvUWEL+S3Y202eG7ptIP4GX3xyNgRs8p

vEn8veMM+yauDcFRiv8uEui058zwxg42ZfdbVXr1T2
fXLKZ2S+Cl5dqmtoXYDYrhTPRk/+zpjhHaK84/681bg1LVopQ4HGZ47oXjakJA2ixNjJL4LsEmwK6ap8

UAmX+HhR2E5GHg1zA8TpRCftYzjgmM4BxULLwn+lE572Nh3D4STQ7/NaZZdrABBtQ66mAOBkNJHKNoQ3

P0Xpp1XeDbiFc7SaDQq4+UiXyIGsxe6YREfaRqstUc
MfjwbVkz3ygL2mRNPwEKtzuFAccsbR5Z/k2JWx/AnHjaKucZ3OlM+YxoMNhnEX99z82gR/Qlm7+toqWf

rSYD8JS36z629CT1sC+4H0LXxPnll2slzleaMSyvmhCkjHLDjMxet3qThb4etfbKoc+ZwHzzOS/PwRGW

FmFIYtOakkL6QOKaxbV54ORWxC/YF+4PADXIPg3PqZ
vr8225aMO1DB8CWYvUs6FwVMewUp5FFVKsqCrmXXxBQ1+qRezkio2+fCKSLdUsSNlL8q+KZgau9B0ASp

l91pSmSfLsKPD0xmH7TYQ5e/JEjxANh/PEDYo7jLWBndLwKqyc3LBoiWtHW4v42PQnE/E3tH2ssnoljq

GfVdJhzotTyLtaO63BvRaybvwwWIn6pkn+Obqj/2k6
bI0V71GPbA73TGBcrdhfarTtxqSqjr4LBoI00J3MKUuJgHmY/gQpP+LJmmqLfRkqRG1gI22Ee9xi+xEZ

wA+bDZG7om+WbqlQNcnwjsnayVMSb51vQWTK2ETZSsM4yuqkqbex9oNRcXwJdYawpDWNZy+SnU+lnzD1

n5JwE/eVQvrY5V4s9dQIPOD4m2PDrJhrMYW6W1IG3P
nVNcCtaBGj1qdOzz3KYX+yr7pKlazFjGG9rZ5fideKihLJTsZH1hPkQxtK9e5qNkNlMxfPRLxa5Kez8X

8kPt9lKtee6mJSLeUW26iq3WBk/0x6gCMj7As02/P5isuoeau3DP5Rc7DqFysq4y9vkWLo1LoRbUeTEO

bca+rHbbjOgXm2+KQMnWWx9DpH890SJgwMa9GWNWmE
fHKpNACS1udjaNxZjmVF2fBkbnHasuH66mIisnRPwigrTkRmiLEY85UAnv22v/8TL5ec++3+DWkYbT/W

mJJEDOZFE6twAVfDR4mKSTjUP8O2Wtym31MDaWj+sFeKJV7qwkB3u4VqZ4qgdHNP4T3WukFx+Och6lWQ

NHHv4Qh8+Z4EGMCAKIMCcY2lZ4M2nsNumCrrkw9nO7
CdjKBDtBbtk+Hwde1mH+ge0BYIqqiZihlRja4Q3dLTzbBjZQg1/VuNU/0q83DtF7TBxFwd3UoTYhFEXc

OO9+49lpmeGHjbeXrNyys9SwkG4FKtXekTr4s+tMMqyGNhbzTF2QbGJr8mjDSfqN9zxC8NmNmVtNy0D6

aFKlA1Fmw0dTK+mDZAOI3q7d4Eqv6S21LDNTj64Pyo
M0Z/6uRxPatkp9KnrFjYQPY24689bGH5mjWoSD0aVZjlHlO6NmF5ZFnerZ/Xz4427ghA2PY+v9YiGe+j

eSirQjQ9UwxB0wmTGO1WG/w3kb9Q0+A6C6u/hE8N4nUQRr3dVsJAlxSJcB3y07uiL8xgRjx0LdFkw8uK

L0z3CkzMMv5NgHDqJBgeDVGVdd1O3I87NCVEK9Pk9m
qkQ/Hc1HjtTw8ur5Mqmwlt67EtQJAhvcAUj3ltU25Ug2HuMIwDgMZFh8MYpY1gnr7S7rzGKGv42b6nfb

sbx5/T8NlXNlcjkoxlz1AT7tGCnoFaWUCb7tFyFotxMnsl6oQnEu2a3CXj8lDqicBibcTAN9O35ekHUv

sZ5cn2ue/0G70r3Tec9liHvUuVCx2Yib3NAGejtYh1
78qt9tpas83d/PxAO8auaVSA6m5LD33FGh7Y1jbqQt/eq5W/JYBBxWSW7UQXmbh/3DbM4sX2MwCB7EL0

bQ26X6GOIY/SSlT6Gl9RfZ/rKxr7iUZ3dcdR6eVFzT+bsibAUTUZXIuhO3LJXg703zCaT9SeU1Rvi9V/

x07PNmONzf8rIborfI6FOuu6+nmNSIZ9SdWwCBreeF
enwck6aEP4xbKtL+FpO6/aKIAbLmzjQIpxcj2HiF7/372wrBDmjH0dDHi2+MEhI57CKqortFOvvuFwMX

sf7k8YzFeYMHWOcKbexgWYFKdSTk27TX4CKlZksMfxcimo+3RYlCarL+NqDCOGiRiX38ZDmD8jCcemEP

bQjUhUN5k5FHr/V/yqyKAABvkvsLym00OUNvBfSUIV
y5os7w22K5b35Mm7esqUwIunltIGo4ZafmOdetXvG/Xovq3qlvEh1y9w/nfcA4ToRToCnYtkQha/FRPw

PWuwDzrwMbS3dpW0QpH9kVnDrMqZJxjux+l+1YWK1Snjeh4pw5aRTTKhXg3auctu09W8EgvsBj13klCk

hs83Oo61cHLAvIdxvCcmJgB4/fv/UT67w21aZ3gz8s
79o33xQOI0mY6wOLn9zn02Y9GU5R6GD5cTgFTjItr2HlotENQ4WCmLIGr57jL6Tl2GFZ738aHepRYuER

p5PG2Cdix1xC3guNEbKQ5qx/VOwgmzn7Rp6PDNxO2vih3SGw6FrRa8Hp80Id0BVmVBvX81JRowqZASqv

WhB6jR6hzg++MAousbgPZr3gFuTlMm5o7Zum5tNsh0
7J3ezlk8uDph5EDvzTr19ltj85aF/MjCE7DAG1E3OV2YAdxHLjyE4AVd+VbFj+EvMtLQEsQaeZP3bjUi

6poU0n2nuWje2KgbP4mmD/n9FikjTTLS81NboABhTnWFO8WjywZ7RrPzj5MWJzdSij6D0i18ivPhg5G1

j1QRrCwsGYzyHfA7M44CyTh+Mea84hIkKNgjsmIBtp
z4nPWX+S6R+Q+5yIHQIxczr3cypmKikqp0HK71zYvOwDRUMhQAFfJydpfqXCk/IK8z7vJyKg/EIk/EUB

vMbtd1hR/aPDeE5ZmUJVDywVK0C/7hZanMrkYWn4cIiCDEpWFXp1i9jmB/2AJ/pRIf52Yc20aYJs4XaW

/+1iI6Gt8bBqduBBuiI3VwgotVICrhJSwLJP+7Dowa
+SBYs11EdLfaaxtkxAmDGEz/HrdXn7edwtk/CE/CbTMZubPl5RcEuX/Z7De0DlhQF7oqHNK7ao0oVJkt

/zGyRQjQ5/sixfX4MoSD5+zICBo255JO1f633Fydt/lFjeZbc2WU3FJJhWVGj7SWSvxdR/FlbGJM9KVr

cHDzgNSQYWZ1sIMQljv3wwM/WHUc2Fx0VYhywb+eSn
ghUvtR37HVLHyWpm5Whq10XPoJqDGtWiW7QsHklkmxFSy4YemCGWeA/WI2aCcvEUhDFUQzmwFmYNA4Ju

b1mzFNBvN6dL4+5JV9+UU/i8TsMplddOmCxyRb0VaKk2HMWVfaXTDbUf9D4NHr+vz4nTQ0s2K3Ru41Bo

3iX5x28jO8ZpVjARx7Bn1l45Yy9Gt09OXlSIykifYS
n/Y4aatOYIJSr1arY19QzHxzuPUSHhwqGgCkhBXa0+5m0KPHSopH1rcJtHbZJjCiWlFokSfhs5sZDU3M

kArryyfBexgXVwATQMLiOqhmmEfV9tuTQuafv6yxyCX1Gzj+RknC5ebXhs1eOrcsDst2rLFBwR4DGsHa

Kn8MTL/JZM7dNPuxaEwv8vx7nlacOvtNGmW/MlMZsm
eR0ifo4IOx8fhYYuR8DoUzYEaqVrlMmwuf+2BTTjEeV4aP9jf/RTH9lww7B96u464+GPx5YEs/7Ek/FU

9mQzuIQrG8fMYdF/N+noqn6lCWBajTjjelbKV+tDMq8UVbQMam8pfbggJ6Oe7m2Vo3NSBrLE7ymBaKt8

l3pF/+yMevyMb/4i67npWFP14vS5u1SY0xG9k/dPjG
OxdoAo6vB0hLb+Z73//p9gfRoVD9XI71XUIMGPsTl+3E41T8Jre9MMin9wtReZO3AkgS6+GWs6RSli3q

QS8TWcMxcA7EuMvNwhxpLrNQvtO+lEdQWfXflLj/1Oigac6/7RWJDmB6LVh9dnheG/zC6dZtjTziJ2Qu

DUy5kuLzYADmjAMGa99Cr/TvWcUZdxKTpP6aTXpQko
Ph+bUgyjpSVLB67eHITt/5dRYgGy4oYs+0l/W0C6Qob+n/A7kP19aQ0+FjyMFiHkxvHM7OFfwGHE3GU5

lQD8Rx1E9y+25VpTiwsCW7gHAEVCiOL1U+QeRNXd4PbN8ADjJKjRtAnQ2uVd69jAV15aSkG2FPXzTzUt

aGCY+5EZz/Lev4F5tr+tV44iUrzKCVDO5YPFgbDU23
mJvQy76MjHgfjZs29wn+KkdWccRDvEHOU/BwNpmuiUUNn4OK6b+kReh8urL7C4pa7DFOycJVRWHkhSZ7

HIKDpvrugHI9lKjscebio1yTYZQItg7VIgFhlR4FhQXisB8N72vQFDbY3I1oe78KhdopnDIimsit1uSE

GNskFCBLYSVg8YHoT70/oOAfxQVVQIErgtV2poQYd0
8ji8gaPKsu7+oKqdYGkajdHMxlesJZuLhdWwN9WpPz16drvlcZzRFpRIMrJP+YvFV77jrX4WpyF0f4Ei

kCgGVGpMb7ZWuC1tcwK12wBAEBPXZKyKHrzaFdJDkoHCH6UO666d7UGjmQsxc6V2Am4OoYjFqP8s3hsA

Co39AjqGMzc8XLtzGFuq1WLUhzGNn0XNBYboU8BGP9
N/G5uGrSO6mvo1UKOKnDEYTX38sDcxKA6ckfVG8lk1nf6gSPrgyjNoz3W29NgKI363A03Z4loV0bWsTQ

EKcePsCZuc+bwlg780I120jMqO+604OsK9JaLy+AaX/ULA6bQFOhGnvBzBvd750ZPyNCEBI+1Sv8KFJg

ddA+ffL36x1jM1Az9wkdgNuHXMC0nDxUOHne8cyWir
u1XVfn9GcX2Lvojd6LxCFzDxI/bVor0OUm3tUlBGrM7jvsU37D9Zho0KA9Bo1Oc5S8yFNY49RsmpWsWH

WGhRMMQpoJUOWu2khm5jGPEyru5rhZUq3ZrHKTmwBUbPzflCEcivJPeMNwUgUFYFXIr2s/GcwOntGPWN

yrED/a0BE6Hc40oj9Qy2VVrS/KDdqlsFNMdehIwoU3
6JCB+U1HraUa7bbMEzzG5VV2l6bM7JOaja2X+TDNtCOkYie6zyOkdWLtbGG+rXx/wVGdjO+kz/0eY3ZP

8lMbCPjr+rgSTctH3ZCF+iRNCR0KfVcmxHY+zJU6SkvcLbfVSiBKy7pW4+u4WIxD4OOOuq3y5eLPUhx6

dMBzS9gPjIk7LDKicanX/Cdh19pk+/8Os4GHjRJ7qS
jF8OqwL2RRjef3Z9WNPOP+EMkMBRPyuJZDHTAKqYVR9qnILfirhRITC0VYRMbJSa4KR24N1a6RbqWZc7

fA007LsTkO22PDOiByIt3FORkoGP/5E7hmoK5Totqznqlo390kg9xwxhhietYbrlyZ7Mk7NU1aosdYcK

kOUFQk3ceup/ykT1Bfo8F9wm51oin6nhg+9d/iCJb5
5ua5X8Rcxcgm/TQ+vqjs3TjbIhbPYksyZMz7p2rrxp4v7CSZEi/fCYJkKGOR+i+SJFuRbkJvnYjrAB/n

1CXJgc+PHP/NvI1xAPf5Izp0y45uxgwviTElWpWy9mXVXRYZw4nuYzFifGL6Rc4oxQa0KmYCHTDNj8eP

o++/aaKwa9xUc90+Ud/pclwP82RTJOjRk1kQhyLkEN
Su94ndJiFnKmxviVikp+uCrvGGwbRxQdhGxmZi6Vfg4aYBqqqd93zYVp3a/2ZuJZkVdPfEqkIoJxjKM3

+sTlce7LjQZ1OvDv521SCJdGTnSVtr3LWgESR+7hBqTxJzr76VDBJqVpQArUOnjw0JGNZ9/d3svOm/gh

RrU0vZ7ARCndqWcemRJcB/w2H4gTSLj3lI4XWgRn/7
CxkHI9d/ZqN9d619uHX+NTqhMdcaJAiUjlatO15M3b/RjtDN8/093h6jCPwL9TkDU0YnTIGXR2ZiMPPW

7k8xplfD6TyDGn/PlgEorsIljrACBg4nxJVg4Qgtuz9Q9Ib6/P5NkWhVXamk+RQ2JSaC1wv+Rmp2zeLj

cPrO1inHwpN6SKquIY3SyzSLpR3Fh47ndIepiSA3o4
mihFBghZRZPAiea9bJBuOh5CfOjIUEx41OPWyKduL8A7c0h9nfxJQ2yxJA7A7QYhVNUIHSNdOFrvDXz6

J+UcNCVQ0ggShj+rM/+hfuq4l1xiTGQ2TLFSKP2H2+9W2OjK/d1vAOf62kP1HEszNtOrUOjKw3MKgt8k

3m1CqSC1Or5KU+5HcblxpQe/fy/Sc3rvlu28LcKHhq
GiG9nrQvlPCSRUs1WLSurXL5yRgLdYvU2B6qONS21TSOJfD2kexO2Y3kgJugHBVBbD2SbtLhv6OS9MUf

5/1zoyXUb9k9L2FvqTZaIgX/vGOslQQxFG+8t2L2OsYNNgC7zEHRh/cf/fqh5Ow/AtUN3kxDa+gxIrxy

ghtroSUKMASKzXe5Th76fuU0chIGDRl07rpVi0EVLU
k5BgSZ9EfMy1k8ML1/o8lLnoFz2Wz+0vuR/JLkqqhy4WWE35t10fl3VGGGdkvTaNa9BHJmjLszU+Ia6k

yHiqU3to3VfCCQOYmIyhXtQDE3cI0l1992c2tdtB3yYtR1AHezSEek/ZKCpfE3cEyrG4KP0RvSNzTck3

B/M4EvUBepdJM7AvnnPH/jkCb1MoU7SnkqWmFuXLmJ
mBY+qurPLjj1oEahcxRvF/CAWqYbi0mckTg+wVVeg7PGIIIuVzkdusjPkdwDGRhyky0uo0sgPxrJuUC7

zmx7JWzS3of/jEa9p7dzuv49FD09/R4f/VJafFnIXeJa/av+0pzn6YD/9oK9YSAR+rOuucdntwjqj9rK

FYh+rmm1sdouRD27+TRlMFQRKVgV/F0DusfgWwDHO7
2GqDf7DsHn9k1yotN2rJk8qh7ceKVxL2AGZ4c3Z0hX+cDw+utUL/0QX0ftKlp8JxZPcQBVoCMHy4mhhl

OzEW5wxZhu3V4TQzNS261wl7uHZI9WRXhfNM+y2hLL95OyhU5MrAU48PSVoDdFNuOlTdbjkg8LQlZLmQ

9oCuv7qIrqD9BmfgCO6zH1PW09qZrgb8adQxtaOugl
IYwjWoZhpyweEZvuwh6xgmpb0wTT2y6v+xTTZgeSI/oyeBI8rcOTyWAx1BaYMUxRQXEG4Txe7SQ+4DKb

y5nRb1iaxVJc1Z9p7ROjyIN98sAzmB70/hjSa5Prx8fnRmdO9RJ70SAfvYRZTzRLXbbgH2Zx8ktYUlAr

Sbw4QwmXBOxNXUkJ+ufDgkeyTc/032bORJFXc3sobA
9wmo3TfgwgyQWs95dcz1lDeBl/YBSTB11J1GVus15se+IYStQapWWfPw1hDK2VfrzHF1n3UhIQCXItJ/

+GxPj1CmgkK8r+kgxq1luVzO6TraYZ3gWZYSvlJQ3eukFoxMos0awL6tCr9wniARGqrQFSByZvD4wA2V

yOGsll/aDMk5SzrOrB8ICaHrFcRHNv1mmUJseNv7Xl
Sib3yWl2QrYzr9AHV0iOJpNvgx+iOIW2/CmBjMUYR4W6ssE43V3UdBt7QmKkpx+36E76i0kykcTkcx95

AxrLYFef+bwWndTiXb8d+8KvR2Nt9f+DTzvXnzzDpm7blt/uTvoNGMCi4mWKroxapPSMKs8z//8KWM/z

nkdmyBd2FshFnpA4Edzte9qpNEZ912OMBJ0jULqM5r
1uxmEXO4drZtTXG8aIz68UybK/I21yPdmP6eSPXWEbHPiAZfDGigw20Jk78tYjJI6kIs084qvDgCMvoq

l+vMOqvtYUFIsPAZkerLMo1xeaQ4dn6zzQfzk9AzkELyuDgTXvO9aiL4577iFYpNj3bsUmYVGXy5MCs7

G37Swk2PS7CkXddpYJ3Aaw2ugC4WcWbDpuz/i2mQ/l
9W1arzVxIiH2ywEvibRKUJVOLYLEkrJFtiD0ZgMhI1lSli/NMJfXaz6IQ0L2VfYs9K3a/ZBkW1rIIH4S

FEo5sCIPKA05uBmR8co4iNlBjXJYfLQhs3vS2HYEBPHuTauZ88O6h41QFSBzOmEZ2O/+cE2X9o529Iwa

XqQCX3tMKRKnzxg3xYIkSrEkp+FhS0CbQA/OfcFEdG
u+eQmCWXGtqux1FexEdu/op4QgJfYaBH27nIdqu8BH4fBrdKywHCzJO4FlQsFNkUU5MdXct5kqBeQUqw

T7fLh9X0Z5KWJ+BbwllG7qCQwSfbTMwwK4U00HCiwHPAFjaYfCkNeV6q7+jjiYy7S9tfdju9/YvC3hY9

AAF98h95hyL5XbESGvn+ZnGBMLYONEmH+VFxY0b04m
P18Wz7rJ30NT/+2/NGmC2wWq1JJHzVobdKq/NTHMOULqshMz8nvSWkg3X3f4LWbHO6wjhiWZPVYxIBLN

WRQdxACKQ/dMZY8Ll5n6LulrTYVs3LuW34x923xlfvIT1clXaa++4lXFwuxKxS34nogVKbduWny1efM+

TjP19TAnNR/5i6kFCEucMp8k4HJu4azPiNeFqlbMgc
kXLnrCgU/URHhBWPL7jc8TXXyEaxiEo6f29cXE5TBU4WGX3hk+gsNs2dATObOs/xSxYh9c6VlTKuJj1U

yKUJtUz5PgBSiirnR/SzV+zhHaoirsuhQ5bwk/t3Ldq5gBPQuE3uU+lXutcrxbNSnT+u5/VB2KgQYHzq

sthiM/OcduPvqz2YCv1FB8+5SzS2qOQ0uXa9fbdPo5
zTqPl9Uxmg5jSCP53HzUI6KlZo2i8mcGD5Y7ROUd7CULQ4mUX5P+rYRjwcqeTrtOVJ/TAfH2STn8mToX

Er9iTBP0ZJQb9OmaUz9aN6KCvPzzNo9xB+LrEwnUUadN3LGgoZRnhR1i3EKF++3/ct0XOEootfm0j/q4

ZkM8rZikYqUF9bRJxOL9wMJ7H67hWZ+mZy9TAP9dJo
YOgQ4yXLcey7Afst/SuuVGxoIy4moXUF0Vd7N+oJuAi5fksUxEYwwe7pL4MhkTM9a1Q3yUj3TSpQaK/H

dr+B4T/CBuwceU3y3/kG5FooS09DwoxUMd3nicoYXfft1vwMdc5H+JPVCIszE+YzmFxnAW9g614E2/lZ

6z2aOfSoC1Zrt0/k6T0aMTARTF6wZhSIpU/XuyIax6
V8xn1v6lorzi14QwWYb7NDiYsMeq+GUkiunH2qHNCplATXdQAbXvcz8n/D/eHY55vojWvZdDVXAOgDf0

qiHrjdPlNp0eBtFVV0xqeUNUxpCGRN0aX9orNm7MmY6eoA+IkW49scxOXdKQqgyMhnZFYM3lAITIvQTC

lMunMqsx+5vTd936nS0djMOZmkwDPLWAmcjWV3+1rP
a08r2N32amzPdfgNmcVnwM9w7kHVYawSmKQX5GwrJ5IO4u37/Z9pFr+a0+mrPR/1xi6TuZINMUIZahbW

R58Sq87lx36BIyRDIt+KbxfCCa1Qxi3R6+IhLR5CzTd6UzaZRwU5OM5wYiI8jVQFyaqtDn1MX4DS7ctS

Stcds4Id3e75asV9mo0QT4NmzPkDwCXeVvPvQjRNOg
l3u1Jpfk6VtLCjMJZ8rF/rfCpSWsKw8XbxyfpTRK5vEnB2Uk8cI6cLqI/3EI2/v/pxoz7nq/rucZoRco

rOoJ73gixKa/nCdgRO6bod3bciSF86X+biwX6O/HEPAPz+LY7UNJOWrwcQbObem0x1M2HeZG3uXfGwaj

SyCGIFV8yyDes8OgmekSNvyoCzK8THXp2T/CwXZm7y
x8zN7Uwk8DCmR557iFwOnibjqU4BV3CuP2MC5FvW7j94LMtd2YuK3Bc8O3NYgdQtPDPG4/An8bxgJh/Q

ps2vOhHSVajBYnY2+dA1IS9GpwFQi+PitfgnPYz1Ng880sq7aI7nrzhsdwhcir2lxLcek6B10qRrrM0t

hHjcYMAotw1rY8y/JQ7VxjYd4mMgIwayf/HNSJ0c9K
ipzxoAUmWV0HinxYIU8T5UhpDd4y30bdoj9nFOcqpaS8g69NQ4F4pkgON22wtrJyFQD6gWtzLrnwVinK

4yed+Ipq4xOUlXbAcr+3uNZ+WwPJ7NcINrbNKad31+rr/snzEsz90KBx4oJqQ2HegbbAVmVmASOc/Hqa

2FT97LXB2UfuWR32jLC/85gbNtzNwvxiPZi5pWAteC
pB093bYDDkyVPrAKlohxqziBZ7gVHE2ghAaAswW+iDivhOu7sQM4QN3GlCEqxOn3FZij87r4F/dxvDCS

K/iSlORWqnW+bOP7FdEwslFs2ZV8tpp4K7yvgpiqZzW3dx3nGfkyHZ+9ZHpZ+0uOgyhYepy+y/v44K5i

1mDdHWBD3mOx1rJJd9G6kfw6yGg2LuwMzWPedBjRT1
2aZ5r9q952CezyvIIT62saT84UkEDtku1NowdZt/IVpBnuvI6aA3tw78325kbc6Bpt17Yk091jFwuSC6

h6alt4HFk3TcRi0QQBJgFKbTXrHFqggXK0s2jQ7+T4OWKtwEDkOHMSWwgwyOzD6axi/BEi5AAqr5Cq25

gFlPv2zPpGkGb2wtCnhr14+J8kSktxfVDE/aw+OPaT
T0Aj/ldw367Wsa171tVIQcBPhkFCZS228hDdjwphHPcFRvfTaW1+nWz8RVgyupaIAF9QSgBhxKEwHiY3

ODdCHZGUOfRPREX51hoj7WB6fbgi3wl+D2YMPkyRpcrnwxyWTURqKsRb6l+eGk7x8M4LmDXTqNovqEh/

ZFnucgblewhxfFBhPe9GG01kXPZxmr1Fh4i5NT9izi
Y1vz4OkPi4s7CSRP+WBm1iysD8q1D2jxrhkiMvyKeugHtNfHAAcsdQwzI3erzjzBZl1FSlZ4+zE+FhTj

5K0ClANmDEIQ7AXKR6zBa8HpD1ImuYf3H2kFTWWOiQBOtsXG3JL647AMS9sZkbaTyo9ifnC925fuccNo

VWhNIf4sMkaC/fn2S1agUI635pQQYz3QPeLRj+VqR+
0T3MCoR2CczoyuPyAtEo+rUuGjcb+Vb/ZXCsxq+MyjtvP6VyiQ+iOA9nta9EkJWfaBcq+h9sHPasm0m4

CwVcHbixN0Rd6h3GJpFmb4gXAUT1XdMLldX1aUBMiiN5qhzDv6NaZ5KUEXQW7ej0QvWOe5bFpZZ6kAE2

eoi64zuSAal/8Tfa8e0rYeTjjZEPoN33WmyREzg+nR
oDLL8h5+zlqE/7ViqfETgurOcXG2x60Od+FnLFz0hICwscSFApXhHbaw+RkuK4iAFnmOil6u8IOoU49T

Lp0dPi5bSC+WLF31khJ0ZL493XwhmU259j3H8OaMrJIrlT3MXD57egkCOS910BejZIrspIIKYwotaWLe

3Dp222Ysmqz1qYaLoAoBhryNrmH3JDeqmcjN8tK3cL
d80U34ttfNtNia+DW9mb6KvO1a+lJy6+W3Pfp+GN/umBkN6A2ESDpeRzaAMiE6Fpc9KtayeRqcCRXXX8

F5s1E57BxFz4Rb/XgLWPRLMWtEGvyhEaWKWyxf619yN9j8nhX1UAFie8S0VDtkpUae6sG5X/NyCf6qY/

UVXgZdknYd0zfm52Gxnef4TV8Ahg79OdbnS1G0x7l9
Ivyp2DNoJdsIyWIlGkMQ+1bcPBm+lxMQ5j8iAXsEx1vunyiJzqBIhBQscVV1nlNL2Qxvli5ZulOzvrns

VRg/7dQBLW55YWVLLthyQU8rHn1uo7oZ0KnRictRecaRaEpl14IuQqBiIlGmQnoPsw2KCbQpEHteYxms

KKTv2rcaRicps4PxWy5laauBP/EAu20idf98Nxxz/x
h3pTYnKnpO3V//niJA1FWQnSx8Dw7O5FJSGLdk+mQd5euPW0CQrFZP3RddO2Y3X6v3vTynTr/4UHgNx7

StntX4WBTb2OG5pbgo08d0blx0tqvRohpgG4u7mgRSivYvR9cKcwfmA+eaxiM+kER811n1Dzz1CesK4G

EuOEGMhlbJs+U5dT8SAi5o2kIqbx/sGWpAi1VdC7+r
mf58Q0BPUcT0Ht4wYH50psaUXZjtbgcBcAsV/N7bWGI2krsI/FigaSjm+PZFSHYZw3Gx1PfutAdhWy+l

95lqAF4y2e6JvHMlSMN20407sP8is5fnrPrX/mmO5ciW4id93aqkRHZEVV8lTOpYikDAMs5s9C3bMTlm

4JhdTIoytPW8qKFumKkwss6yNO3V1z/PAEwlKvDBw7
566/DvG8P15Wmug8z3/2Bbnt7aycQZiU938aLmI/Kuj1nkcyqnYtEc51rkcGPyZofEoluJdYzS1diPVq

y97EnLy0MNMOGUdJV16RiDaKr4DmSAJkkAgdfOXP+dO4vF0/OwBX6S8N+XYtLKhHjyniRmsaq/IHBKyC

0gcSgPLq9mrx4euTWqK9J+T5J3dvSA4Ii8AmTIpi6o
9uQoNXWH6MrNd5kPqZyLDbgvBP0xQsiMynbk1HQTwxVE+OlC6tuugeNL2EuhAxLdCEk9FveGDYtYHYCv

F/zqf//1jbfRVkqz0E/rmT/tbs1t0BSHJ8ERFNpqOd/jfZHPdWR7LC0M8Rud4acKTKOddUYjg6BLm75I

gL4z8wFKs/3Cc+DOmAii8pJyC0GPymsTsNVN2nLV8L
qXcaFfSp1V8hpVy74xSZnFBRptih5ZrgfuEmWJ9DfmITxy1cUttxz4lJjzyrbtl1ip+II+X7sh7Y/ryP

AZ7c1mWPN5vSLv6lHTlBvDz7QmIGzAr47/M8tH9EvR+J7FMUJXwAvhdV48w7BfPTGwACzEWQ0RY2bZnR

E86A7TvEs/On8DGWHMptayvirQfd4MaPf8tk83+smW
KxXfWnAIqT+SYZtS0+2V5V1N6nfYV39jztNum6vhsgX94ARAmt0yu6g8CyyLFWZ5FuhKg5uJcIkud/3V

7AUaJQSfa0oZpJlCSNCqrUeM4DH876jX06YKGPcNx6KWgD1uplOqPYQRT0tQZo50tPW/bfcvg2A2MMha

TWagFhkDXw6LLfOgGH6G+rLxgpBnyzCRKPHynH4hD2
Kfk6PHukdMZFaTJsEbThJC++8PYjyrMdGscGnJtXb+ccPyFxMRu5oHdCL3bgC5kAd6I7WAVLd1CRjYyq

o6KM1Ce5oKJmVedLDRtPndIbAzW27EoM1225WGuru2PQ5sICpOCVIkb+STt06rBUJCQT6kFp2jUad5Zg

ZJw5vnyPNUl7XRCzXtzPdZOoDpyfIdU8EhlTTcR+Q5
cf0gR67IWiwOGSMPmYnu8JeV1UWkJz4AwW+Cq6gvhW8jNiRIezU7Zk4xFcvlWUHwR1bI572o6m5jhzA9

3HlG/q5X2iMua2QhKDiNZxXKjR7W5fP28T1mAZyonfWJpindY4+u4lpi/VQca3mun2hCmd4ihUl5+XdR

/1O3TaferiBpVuq77egHfn0SXiGRd3dsgQZzPloMiT
9nz0K2NArYqWXgR8epX4ACQ3w45wXk1wJavdbZjkhXiUGIUGpAboLkarHxC7w5Y+PCsruhY3qgn6Gf3d

yLD6TCX0I1KbLEKxoAgLTFSl3T7OKkRznZ8nFlV7Rcbmlb1piorrfgcaF1M5WQXGo/fdlqSUlWnkTsws

n47ABQpG1sh4htfGKC2kXC4jJDgeSlMy9Jo5lGb7B6
+uFKmJVCZWi5f2+Y5LNbB+b4tfsMpVpbViRMt6UZFmKxwj5Kt5gSPWWq/vtg01EQQHGlSF5UsdKzBCTv

oEV1kmj66tZV3kEnYHvYBRL5CQhPLPZfkcZYoFgADm1UC5zgmJF5sswkwBY/rV3vjMmQ0ikmU6xsZfqe

N9M34UWKlRwihqe3KSWaR8JB7faHXYNSn1BTorxO98
SttK74nzdnIK2Rzo6PB1aKXdE84TtFkxY/nrS8YQC01sZkCWPT2GotYleA0sSvkAVHTnuDWIbhOTbR75

5anhNGDI/QbotJ7f4rszlLjYWWXpwX63vkGGbWv4sEqnLPbuZoLSMFaWuNEyvGq+NQw14xYIPVhmjA2k

9iRgNVzqcapX/exCaYEt+t55reiolXIerqh5jloKd5
IGgbVqIOpwAa1QDGfecX71RNEsbaHXKhYEHEeBwkOns8Okn80LdHI8KXgAqW4Hz4rqIaJdvjgsVOJ1gF

2BY+w2hDm3TUS3rGPjg4Fe55IqSRosxCdl1E4wPwhUKLFDx4Zbi0d9NZVOzhgw0acfr4mNIFgzyY/WV9

iZao0xm/hK9hX+nHJAoB74izJRCDZkMW4lqZKMFzz5
BD0L/lLzqU8fcK3ejUN8iQWLQdYMf9C6yO9eAwMygaPcR1uvhyhzNI1Hj85Gsix3myp+fhbk6URiJ9jl

Si/Rw/bpgCo8ZtXq+sp/rdiBy/k2IU43rn9iVUTcJIDqUSLMzNS07qvYexh8IJC/NkeS67A0u6eNxD0v

XYBMlQ5bEY8Z+kBux967W+41u+6Vu0OxKKtAlB6QJP
3xDxUsrKdeGmYEst5ZoVOtsY02NekpbnqkBpQHhFJnec203JodqhRgwnvX/AIn/rKlVOtnO6R/ps9oUz

NUan//ziOKWi4zJAw418nE2Z1DWqt3UYYLOso0RD8M5R3lDhjN/+zmkapkKMELcM8XJ8AgDTdVTjbVYR

c4+l3aVSk5UojtiM7nAgWAv8FXvE1eBFBXpoZQi1Xr
58+w6zxtWPhOxGwKxC/zAD88tbHLmccSpxHP+wHrUL/363BJqkOBgLB5GUhNE/8gaZB7USa5MLyN8a3G

pfWb0gZ84R3IgT5zjRl9mPuEq81UB6i8nwQzDxLj1kr8TlMJxD3rvBtjwJj3ruwYg9s4n/Hcq3c/xap6

1Jq4xAT6UhNLvZM95lD6X2PDZ6IyuJGZGWv/KB3+XT
PJAk377lHNDYChAgzBvrfBHXASYmhqp5lg4QbD3Y9UHz7FYHRwAbgdKhhg/YQSBZ5yRF+6Ti0HstzfN3

fDutVG170EmSv/lR+oJQo+VaiUko+Xka0+zHyAviW2iz6nNLYX3Lmqsd0ymGOjZEYG1hJR+iLg5cDzq4

bvHnGwSIg1JlBpHigfbaiuXHgPv91veY/AUuLwVokI
OQeXwdzVXOWHzjp7Ny9be0WvV9yAbomOEqommMAhvXzB06aZy6EwCUh1IcRaGdffoa8wzhsCL3AHwfsW

Ubr3NqZmBA3bX71GkbCshQzsL5hOCQEsJzh9pXR23USqCTy/+lDy1ffaCo7iBeruYt5rOVjMZhBFBbbV

GEj+62CNdLrGI+/pG+/dBCCtugK4JWm0qpSi5M5Bj4
5prIErJ2mMDmGj8w1vSvYCp5YU8oMqzNHZJWr8w6tJca6BFS01v7NETY+G/vdkWhvhqiZ5pEaSALVVWQ

GOtD38bep/9FnJ/THZo3VI+MeyGVh/9PFgdTMi7znrcr7zq38mMrrDhSnsuTzBzl8C+Ve1Z9cZEVoUB9

qWqX4Jmlv/jpRsgNtnL48nfcvlJ0z7+g7dLo3pKy28
ff7VSaq4QL2jfVAUH0jcu+z/DvBzkU/i8w7ssxlZH0C8Ki6Z5LkuUow8pThtz9AneCmUHwOnolRzF7cx

Br7gFpR2SoIuz+s/Va1/6kRXpOD22NTI3zHKHf4qwB5jlKrg176LwHxCEZSTZint+JfyAWscRO6WomuS

rVb1fy7NOHryhS1fHL91aXtdUYIFoaVh9rk27AKrsI
kvNIigJsfMlv8MNznXRaNRolHhjUSEOWdi4hBcrOpVN/t7W96yH3GoVyyh8oC8pbKlTY0Dy8UxKqhWVq

KeERPY2/dh9VFoKfx6xub+bus90m4J+rVL1qsCGt82FJiu+tas7XPm7i9HYTP3Zihji8QUv/DPSR403R

wkYzfbMwkbtz8kHXinIXWAz2oyGsIAJaYNLkdSG4ps
HCbVKSiAtUSfztN/H7SZCewHvm63BAn5Ia5eEiuFjNV/Ab3fmEwiG2IscO2gcP+100yBByv6yjPDLFI9

6+yeG/XK6O3Djpb+WLVJn2zMQBJdf3q5VYj6sBjpfyWR8sawIoajghMXF9dRKgMsdNH828uHMYghKzjL

TtEXpBFmVJfjge/rG+ZSZBFaQpOZVIy2Bg4CYdwW4j
OXWHcaxnHqAsrTZ0y/P21x0du94IzbYGJfxi3BJSV9kLUBop0+p+Sm1IVSp2iNH8zQFPh1f9xnUE0gyL

u4m6xpVvW2tB0/7y4tJEdRlrXzYfS86pB0AXZc3RbFVtuDhrrJPR8I/YHDCBG5saARwc1A0cH8ptrcRa

relblLx4ZjB7pUnFO75XDFfJAIuCrhL/ODD1Cen/cc
2O0nvzQ/u8uXqNm7x/V7H3JAnIJK96JFudN07DGBaC+VwJ4SlQPRF5WqNtSG1xopTyB8KHYYudrXB9bO

UV+/dlqktUlWece8/qv4mtL8aA3SeVMEWa1z4XaYc+3LAsAmf5XsLMMfkdHU7EHf3M6Qj1Hc33LFoZRp

OfNH4SD7yjxamgZ9UgaUhK0M/m4iVvPrLul22JwspI
FdK6GHncIZO2/VZ2ww7up90xpb5dKy5vRcfNr4TD+6pRVE4mDTpNVIsPbrv6sQ7wwBnmxerwBrFEiWt/

fLp8rQ5p3M8XUfAu/Dariela/5I87XsVH9Yci+v+0dlA9VbkQrY1+9ZGykJR5sz4jn1EmLkxYpTRRwwmDnUf

ViFoEQ/kOosFulza+ryrN3faj6m/dVctOv5mw9iEjZ
XuvRdIG2BznvVjff61X6oZODwwvmZJcjK5SkjcGnDMvG1NofHuxOSxcAoQ8Q+U1Q/N1odibVTCE5rHMx

+pU1699R22zjs0byn8MypLnGcAaSW1C8ix8N96HW6CEo1KCmQ5BVaEIQsLAvjhYj4Y52H204E2kHJFR6

zS27oEO/Ew+Z/Z4sY4B3BZKuwdxvp1550LLDoXBsB5
oMAonDb2hmu+ncS54Q5PiDst96Dn3ocZBSkfx0KVoBPfmpVoHu/eR4RtHqXAUT+njziUzNaY9V5nPzyE

J6Zd16R2Ejg+Ht+Ox58bh+/UMBJNV9ouI2sQq50qPZjmhGy8LxMV2sNl9eTZz2p2/DFLAlrY21HmhP89

+zucTIK552ThueW29crc0BnSt1UFqTmxPoPLeYSIT9
9w6GFLRI/ajswFeRWQaj2WcHIYjyQZgc2mRbytzLEU4nM9MKIq8f+U0te/SyvVljldgaF/647SrBKg1n

1i8jGdfsjet5CoJVMXAZUFVuK5ASUPMa6D+Ecv5D8JefuWEWEPVsgU87WW1Wh9MNMR8N3GA8kXO7crmG

2vp3TqvHugPi3ukVww/v3z+mKxET3HVjSeR9lo3+fy
TJxC/lC9yZTv+KhAtvAL+7M9RKmq2XQ8Wtim8/NbWTMFm+3Skc1VNvGTxvZ51r4v6HvJI9R7y2Oo+Cv4

D8nZZVMUTFQ52hlkTTfN3spk7xZWR5tvK2fV27yfUIU2jYHpPiFvU7KfSPm/YJi3iw3bB5Yf7OO7zyvm

UipjLt8bS++KiJcozu23WpQiBPCYXuaq6QGAyC6Sh0
rHmxpHQlknDQkih3XnDC1Bya0tmNqAT43pa3154TFjAkTN5TpjvEs8g15UzQq4q2902mD57RlPVBPFLJ

P1M1d0wicwYOWTe8Nz0nk0Grxwmsug896VyitDV0FN78+olPapoHblle8xeeHGHPOEcscKK8EbhRyOY5

OQXjdilo0Y5m+L8zN/+vmS90/rpEbF1cpm9tKwW6V0
fd1zGV+r+6NWH5GjXjCp0YSnyj2V9cvxcA8f9A6SUtCIsKOg3kD4cRMa/CCPbv8YgZSRdzvlXWOD3Z0T

5byJ/SIJMSzFCvzYvWMc0Geje7ZqOtJ7l1uRToynfLv7AqlsVOburWwESWQa04asz/0DP0VybSJGsKrb

T30U0Jqt4a3H9N/5te58pxXW3JSQPBbyjrV67EL7/B
gu5VzAs/A6dcfjnb4vKOIkcK6+nyzPNuK1qCFGXkbrkqEgFqGRPKPR/CH/lClzdDZOHBxmJ4vkI+rfW9

+HwZTCWrIMqZY+BltZWq6p+gbIZFWUSpTVAp4+2/jHqWZumZeEDKoLyH0n+uc5Lkl2PV262/oUoLju1f

f2h4nJOT3M5sdjIv69mwqlursWst2vUVn32lb2rt/C
9EL63nXjiz941XXLufyFXFapIDq3ofdIEQh/7tq2q7nuzn9ZRcJpvdF+ZvBMAORGU3AV41YskQNq5QR2

WabT7nsAoJrb4RNjvSQT/MTCOjjtMWZ73biy20l3FTSH2Oc/ohkEQcilFTUBa8r2mbLOVKbV57sQb6Cw

0JlqQupe0ANNTJG6tlWp96OLzNRbjC+i46KoWf5Grs
8j5Vna6TjqOuehTGKzYtxJwPng+T13S89mm3Irhaj4A9Qrf0EYma2O9F2C2O5NoMjzhw1yx8LkHJUHPh

gzkSKnGbMOxnTH/A0XOjmXNVSjRwlhyJEv3XkPlOM/VkaNH8/GVXsH2lBsDrRyIYmZn0zHWTJGQayef0

AbP7YZHcaf6fzwMqOygQ+0CgdiW36fh+V9R9PYPa7N
PV5Z7gys9O/xn/YGyOggO87Z/cncBV3kgvmX4MNavP5DHL5jwdpQKvhrFox0zU4JkEgr9zaR4EeA/lMX

/ngzu1XiWncxnA02Z6k1IX++lOMKQXeDAYcK18SqSf37oeXwLrsHkfUmMgVOqxxCd0umLXg/3QVmnIe0

biprGlCyl/pf7gzfUIkAy9Qk46aF4FeDOPVwJD3eAL
mSm98haysKefojvc3/FRYyFe/V3Rpuogex5ALbA4UIMgG+gSIM3hM1xZ3/4ncoVfrFN+ALCDgwQRVGCj

6So/anROA4Muwj3Pza1QewAJ+LKRLGSXJVe4+mOxXNVN0GthxV8hLFHd7AGQMo+Sh3/n00DqHVxXWj/j

RvY9DoFbcvR8z1UTC1xgUgJCzaK66SqxBwYw8MvL2D
8KNN7lo3P8k3jjotorXK5+QlrQ28fMwHDMCWagAP2ShUYDgcnDgrePXO8bNkL8CAkQvErD8CUtpC5csC

q33+oS090C8iLw8A56eufx6kd0gx00+MJ63zd2V8x+odTvXd27uR+AYiwwK6QPxjCYc8p4sXzX7nIkot

tvB+TDHOTgbqLhIemhYaXT+yFGaJB74GgDhf+SBHaC
/Oynm8KJgJNOP1JcxslmN6vQ9K13XZdb23mdcxmPh08P4dWpitHu/DfFBu6PpLtXlt9t3dns5/UPVNJ1

5z3rQ45aJ0wTtnjR+XCx0YlvmeW/Bi+1od8tx1YGMWDQbvb2KkIROChSZZzzoMv9gxCCe8NRP8r4GUJQ

cp2+JfIGq4j4yI7crpzKPV32VWUzMj+7bdQwADf/NL
1CfkJghvoPYKU4nT4cyZZ1tbvmO99ZExZJp4Oi25K7KF0hs37eIOckXfcUUvSD/lpIN+L3qThSr5RHVt

qTPnzHJVzbH7ippX7fujj5MGHWfCnyGj8igedfc/ztw4wcx1hsRWb1eMd324JwqOCO8wj+YDCb4HCpaD

uGBy7BucDWepSMY1vFnhZKgDztNi1gxCGT98p2E0iJ
VCZrzzWlnot0CWFpC9R1zOWnjBIskprJljaQ97IHjpCvY1663tXaSuowGclbf4xXCKTsfTHk7gDbrAHh

h13+KwcnCjbJXtO4e30hm7lGOa+KvqpuPsZz3O0zShL0D00XX55YgJg65VOJS3GT8qoZDK5bnpf/UzTr

8IdBJGREDN60DSl60IoXuDW3ZUPbBH9kpr5xcyPW0Z
yMI5kB/9po4f+cd0JkY+0RN14gcxJ6o1qseWqEoQKmiLxBduiY2GOodi3HYLRnatmnojn2qItpp0U4Cs

F+ycKdxYUdj2inE5hObGVDeT/4B7Poq+o6OGN+39d7WBci4uwZ8N+ysa7Vf2ptKuv6W0h8JCWOJ38Cw/

oTEL7iFRsTHvKuYWjkEPP3ndDQ9tHw/vHOnf+T/YV0
WAEz7VjIBpOYLSErm8B0E2p8GxIqemhiYzavYH/U0GQuKquSYPMk6UUayK9N9WDNtwg6dW+A8lCyLJfC

j1n8qnZ8OATqnO8g+VTGl9vpuQHoPzwgRXVy3JwxgJGhvjUypkTBqhlNKAgW6MjT1Dm8aw5MJi5rW0mV

C5Vb7u+XuMxXf56yTmNA4NdZv/OMyNaO7IUewZ5gQ+
PxGjOVrZFy+jdETM9Dq9PzKJQitoPpIQpWlyWrv2ZelNwr16mfeIy85fTGrahbdFmeBy5uvqy1BbOKK8

LaUwlFDe0BypDXeuEFc60oFDScNfPsUp845t1WAgve9FHDwDE3rqcbg6YA+9nPdFF3cjsUkZkTEXlFBn

w/ovRgc5aIS1kGH5CPe6TmSrumWQtibBnwkMcgzYR2
t/qMWwEAke0gzQzKx2uaTqV/RQg9mYEZEAc+i6KskeUGm1sqdbSoxw+gDxOoW9Dh8afJy0Pjvw6iOAvD

MoThBeFNeeYTZ6+HVW3M+ppsYKv3sTlz+Wl6h01GYLA2XWFFXdRtYki6tkMli8G38hXc6dcZld0kt35k

Nx8zJKxAnIx2ch9wnoCER96AwoboLpnemYD09RGUve
vnPTj3XPIww2AWQHh6qXjGKdr12U9EZbLCUc8uW7ufYgrN3T81jWZoYES6uAp/s1ewfCUwpQK8/K/Yany

mdBWdJZi3caEtXk4di/MhHdkfSwwbnhlBdBggzaAEzOA1rgpkzujOsDWni9xsFmhuPUkPJzZis+NWuk5

zrZ7byp6v+RkDLKdKstP7kd07/CJALJepsYGU/lbr5
dJmb74QvCNCP5RzG/55oiHV8Vlf0p1EgY9+i9NisheK2VDvTGk5IBsQ+pwl7aw+1nwG+QCQui+c33PKo

zaWjbcUnSqevragP2NAAbQCNA8JqHMRAeyfr7fjkHyjW3P9yp6oRtzUUxDOMP2tlU1OwAMQW8+BtXfD7

kGrLF6mOFp3wNCPN9aChHDr38SwYZ6RodgRtHly89z
HdvQpRp2VuVw4FMyfcD5uUv+5tOLbk/6eXO75XABU24rMhdN7OAypFD/PAlyp4COwdBsPKod9UuzOTHR

XVFizDVgya9kuayGBjAol87OYp/B1TD3Df+eGzAc7/cccfGbM7lTS/cXMvh8ZXw9LdKUbK1LMKaOntep

VQJiurYfNPLFcxdSWHXyZWWA5AGGQXLJ3Up+88z79w
Kw6WuQuk+251z9Li07AZ0DqjjrNSrJeEYSV3dznevYnZT9wgSxm9hV463gZC+99Jf647r7Zf1fHx9imB

k9AzTBhEiOe9rf6S7nI1B7Q1T03s640Az5/R/LzVcl9ou5QX1gj/BjvR1lMFBB8LUFpZcOoPE+TalSGC

kVbQen13D/vXc+uChG3IIPLFMktVYfyGj8T5MlF8ul
vmEoU3e7AKD/e2o7N/baAqUCH7HHEKq1E9fsWU+6EVxZKq2YlNSKDJ84cqmUowg5yowa3fUPMxPT1esJ

e+2ISkMywcz1/glXL6UaBabkOOFN8fELCbIPUCqCMtSzdT5DJtTpGmcQxDf/GgCYWHVQdHGRSj8Atke1

d66inDJYuyIbmchKEouZnkmYTSXssveP8Nn+Cwb613
3sRwo+v7J98+5NZHj4YHSCQrnotqyorQKoZYvLsq9i9cICQWrjL6zTeJqRmzAJh+y7pScm5bieu1fPz7

GuHL3He5+/OoPjPQCVJ9QOPRkKf+hEjw6aTJxkH3lTihYJciAYpSFAtLzsHFR+vCE8dUsyDdSPlqoSel

ug/15A45WBps1AxGST1GU7yW4cthGMDrXqAGXw8Y7v
Eu4XNmgyTKGtdoOxHUlLxyv9/sui8k1NJUQ4RfhMSdAWDnQbpBGDg7puwG96AoOWGZ6k50Gpsiv8ixE0

rMlmJ/y08c4SylFGFkTiaI0LxBwppkteWfdfLpgyu8AOg5OVrRFE4x4t2MoQ3QrO9bQPYOUXcKEkBJsv

55YQVdQDvK8u5Z/08BCuybCM8LJIt5OswX8pjco13y
6gqXAlBdBEF1W9C9i2ET8sTG0ZGvRauOBNfJEFsRwtoNxO4/15Yg1LhXLWJ1yDp4kE1badSlspLp40+7

rpwjrWWX/Qua1xPCGc/2u8yBxI+6TjaKlCV14fLZy7w5aL/U9+1QJJU+DRe3qFpYjujkp41yrAKtYSl7

S6v+7dz7WjOJJBxSC6Dkh19BG5XFPO14a/TCrWiNSN
fvPjYsLf4srnOjToDpvxpZYr8a3laZOwbji6vq12DTesaPCikyZdkYe2ntg1wyQBsXelDjoar4/YLT9Z

/Skr5ZiH8BVeUycDksUIcQOAHdnnLqJ98bQIymPHBEZ2UpIOj9E5AwAY3JoqtlToycIk9HhVZfTweKy4

fNudy2kFJgPWAVBG/Szu/7aEC8T0Z192Pz1WlHCJM/
npcNrDXy74qewJaQGzzK7RxLPL7nTWRrqGkm+au//jx9fK2glJt5kZcemuNq/G0GwkbzqBzwafyyPkmz

jRvhHOEzaXR/hdL17WaSn3gSPtavdo57Mg1wlMmktofubPrk28cpb/Tp+66wUnKKMPhzIrCv04xbYVXU

UDpA8VDRWmnDp8L3FpaMOz/Li3jsYokKRChJOebDuW
p5anH7iBiPwm6GzOswqnld6ZIfu/q3CpmTnHk7LCBaxd6nZosCANs5IUkuQ0IBC/f4BxaGLZK1ZYuWBz

nDnsO9minucfqDzFY0XWAu9BIerAsJgP3dpqJQjwYBTp/kSfBjeEpw0wp71Ev3vO3DlI5bjRB379gg9D

vMBtSwl8YEF5mKUtMEFgn5UM/YNkJoneg/DBH1amta
p6eb1vTluYEbRkTaDcrKN4pDUHcuqwgso/J/m5BrvOYM0hPQlsc2O6JnCp3x1McuAAjA6h0PSSpwfgBS

GjvZX6QEDkQgm9AfG0F2NBSDXNZcIy5IGCWXgHm7+TO7wz+rmfjWYujn0Gxw6M2qfVmDcPJkYE118/05

U2gBjmylGPY4+XAMg2BgJMoDPhY4kMXxloEaDTrVcX
cVNT/RHYmGb3XpbVHgTra+B4MKENOpMiTKjO6JVHFsDjQaU6sIiL33JdGRKT78bTO4WAaQqEZl4f/568

co1scLXXkGi33zRjPdkipHIRI+RZF0SgCuZ9QQhJshojcv6b4wdYtZPuehn35Na0XP7QflqVoktL61i/

1dEr1LX5iwPrbkIOxd/PMhXwZfjPtzVrbR+vbGvCHc
fDjidrYKJ51vg8BTWMz46j8kvk8L8G4oMcOyoyI6fZumGE/SR0rGB3vyAE4QHUWdTk+GOF0wanljj/ar

5MT5/ifrUOqsKjG7srDOe4Fi6QDnjB8A0FOfVFrI9Kfu7IFW7xzVpwC+gblA6QveU6bAvRdx5WLAT/4W

HN1uOdeMzXZPKTaOaGTH3G74DYysQjtuTe+4F94Hv3
qZy1Mudrs3FLoAG9EJkMPyDjHVNAsrsLGG5833nI9IXYcheMCpaLcjl5OE5tkqyByeiIZBy2eWGXhlxB

ND63NHCi+nrsg0eMwiTWe/v/Xbq3cjCuQ2jWTqtL8YMQKa9hSFvcPYLVjuLsVvrWpdE+ezioUa16CAgU

bpb48n4YMVdSthNzzXXWypK6RNUQ5gUTER68j9mntm
zPfID2g5xIqvIYIw2Qpy5FxH6E80GWbRk2O40IJTq0vjPWRI9Fjsd1hlQwWxGxg01ot74l+8KOmitXA7

Zq75+eVncUjZ4Rb3k/VZ7D7I5JMgwgEuYFInonyT3D7gpWkRbRBaeo29THVDqraaHV5HEDsc03im7f07

aK8y5hJN4gQVRK9FfMkgyB1z2/wKvNBTka3bV5omSX
HyNUWoY8UFA7SgPlz7aLjI4NBXZ1lUb4WrSnpTmUplmbXVlYD2Ye7ptHQxrpEVqK9Z6xNlT/9j0W8Iaz

lumhls2a9b7gN1uhhF5xcaucnh+Szlls+Fd2bR3SFsqLNit9BNd3tpxDKw0eCx2HLb3zPNweNtc7QQq3

WqUTMCXkaZWvrUVmujunJ+PCOwwn7oJZ7u5/R2AZ2s
zRG9JGnRb1KUthGaOCLWajn5eqMJrIMSeav+JKN5XOVdi0XRDl/fcHEwZbfDnjvrmcpWpl91EwN7wGRd

dA83/M0tIl4kDa3UOaim73dkmg9qfFt+37ZIDFErkCEpbbqhbWD4VlDhZd95kbNg7/M3PTk1x/9lyaXg

xeKAF2EhC/VnOO+irywpV7RuJ7Cz5PTK/eiGQeukHa
z39findRPXseZ5vLL53LM6//vlwGPIJBb/0ZLvL7uZ9yyc4EhhOkqY46GG80l61vSTfdJme9ehv+A60W

uZOT/ZPKblwe6eKl7v/cP+wb9+DiJG0hXFSyGgOLlqeckvjh2FXKb/7hvSAvBAgIIH0yhb6SzZiz5VDL

iN9Zvaj4OEieFfJ1SjNtvjh7rY2C766l2mOWj3wsOc
2qD6joLpKtEKHxcmb2b3/9eMVHHthd0Z0aosBN6HUMpfHsQ7BMWWX21edwbm/X56Fp0Qqqth70m0KgBU

3ijY9o5Pd1ebmeReQcd2CzUtV6DDOwNZkudDB8n5yN8Oh+5nPx9scRAW/5EbNlpWgm1KWXODOoDEYjod

BF8q4IXYM4FyGjsB9OsR5DEm24S1n43AOLhlC7IPzC
31dYO6d71FWiQr3w5elIdWLA6Hxu19Z4LEwbyARKm+bxlPL2fSd6cOn/50ExZAHnuc3NiJ3DIyg+7k/f

K96es/ysfccDyFRBjJ3zI31yX4rssDBo8Awy5QQCiRpb6Be27DLEabl43A7nEnn+S7OhPedn6RCa8xZw

MOPlf+hybOLubSjMn3Iw+yNPfAzAqa7j5MFVQb3fIr
dCpjLFM8Zdz2Wncl+GPnOa4mCmUhnzH0EyKKRiS3zAXlE4lfiR7EJikErevQ3Palle7fnexLZ7S71v8G

zp+EDWARD/j1TXoWTb+PFgn4C54aCZozkwCnoa4rv5SvwbZzHMt616RJ2BNDNkUGc6KyNrrGxecOyjomqbOE

41mU+hnZtlA2UFt9w739RX3VLk8u+aHpt+fouuDx8w
gIdZtkSlZ6jrY04uUbkwbtwTpNXdINPsQBtj7/hFckPxPlfHXXo+NJ6Y1CGYDxgeL3JJ+Q5DDPducRDI

BlecDU3mKdGYhEsY7tzKagk/FnnXQSOUJcSgUIm2e9HBInaGQaXZ7QTBWNBLA9mgSlz9bA/yoWtq6QSy

q6WJdCUr84JTlJ/96QsQ7zVd/pFSmCF90FP17RgmLI
XzaMeD/KOB9d6xxFkbTvtuDQv6zN1SP2SIzL/le5zWcbthJunvCORlOismot/SWOd+jkseYq+zZ6xtiu

bn7i930v2aeAGWfD+YrcClmz/56JCM6AWZPDjGVqzy6M0ZV26+HJni1gPk26nETAZ3r4SRRrCzjUgRxO

cD+MBOIU4cu0Q6pCEjjLDLJG6w/bh3CR6jhf9iGBet
jKaMITSnMUj79cxYzQppiBt2lhHRakjHP9di/X6EJOEm0fanTsLyKscLv3v5Oh0+k+13Lq04FOhLzXWk

TGHbO2qN2TpKkMHXixiZU9AnH8Soa44Ffd4Ra6+WE8LOO1FoffG+H88i0hhfe0I+M7mfQ1+2VElp+bfz

lEnqEMy9IjunDbbZR/43W7VBLy1V28ZFflulQ/sSPg
2oHdJ/aQaZznirpW1GfnW3AvRUjDK4LQupQ3ApbjYyXIKcTEED3cNOoBPOmsMps62+MsrQD13bOrn/MT

BbRKsNLrDe2ZKqwhyjV7yRGFgU80gvlb1IArwf/K4gEuXKNc1SjYcPadtcSB+ut6KrtQeZVtxbBPOyc2

e5gGV5++IIgkg7pYGN89F5dTGJAgvoLx57DH49dyii
z/9R8X9px2DODaCUxG8IWW9bx6i+Xrm0sU4hW+VDxytHH4jAWYadTp1TiLJAO30zRxSn0hli8GLH4EFV

D1G0futfdUCBqNKOyUOcHd5rGaiK/tT0LfP4CQOlq2j7rQ6p8Z+ZJIcJDu9K+0yZ4h6DAO+CfASWVxS8

O0hO9Xm/yPXVGhqJMLkxtiDvLYKWEVvuM+Amxc3X77
5tGeJFMiNJjSXfxusXWy8XSFiEqrQRRWfGcrWqMoPYH4nshlFWoyI43sixMbNgbqB4ULBqKEJC23guuL

gwyuy14d1rAjVXNurRqBYDq5DAidaRdd3B94e4C1MLoGR6kGpjdLXTu1UjzKK/UVRiBXrOFMyDq2wtWC

+H2YPRxqz9pQeGNUqdVDjd1OH9uHqR284dCS3k89Nn
SlmrRS9ey7fT244V+N+SumODaJ5hpOY6+pzMP7KDCVMyACd7HTkU8RMPlG91Pa3buVIj6iFKCUWjmqeT

CX3Q32AU2d29khshGUfUS17sPzHsLzhf5kJ69E0zzlo+5xVqr2kgNi96Fe445W/4BxJYV8XYKZtoUyya

AHKn/rjiK0eEv8kS1LSdSI9tw+nyzBBbgCTN2xngf2
4DLo8zkfIgtQOm/FTv/xJ0a7nw0ju4v115CayKqq448eTE0ADe4qIRbqndPF57QrJeJuilhtUJyXCnSd

Pi6rNMudqL/myjeTrFXsCHEwJ4V8CDY2OefMER6lTtk7PGM90zNH/wvCQ6GjYuDDyZ3v4d5nx+T7JkHk

Nd1ikNIEkFOBUCVCeVkYpXTpn3Hb9WpjApc/6fvW/+
jAhM5KfyMUszonTSceBQUAiqJiJVi1zHjFOaJ3o4l8EUIL7rJ7jxspc3DOwhLvrj3gc50Jmd4nR8fLY3

hOfe2mM4RY0WvYypPp0tQEXKeHtV4Q7kaT/QSX4Atagnn1KJYWpWbV9AVVuTLTDDmnMwO9+MnruwlCLb

XZvkWlaYlzfVDupjSzV7uPpgu7rp7FhFpnodCSKeuB
mS7KZ/+d9rAB7rcdnYjkfy5OT8S2yvTascIO8sPBUntqUEua0Qd6mdpUDTf/B9KoJFHo27xdz1AZ8FWh

qGh/hfNdBfOZ4lyuOpIF8+BYJFlWfCtwtTmO0BVskpqYHMC598V/7RkYNA5DPLZo4cfo5CNR3RP5T6fB

fd1VwMG9YJtv3JInQBFDN0DGQftWCC83cxiaz1gwty
z7an331Ts9BWP3c5ut0jWL9jFppFHVmYlk/pkSkCTXBVbwtm4yu9l5aagPvA7HAZ930pdfWTVt2eMlDw

TgoaMoirTTk347JyzEFEfMXbPT1ir6JBZg2cfOiBOBtNNjRGdxIH6bF9ZVXGt7j+mJgCOuJI06RdpkiC

rj0cywkMT2CsVnsGni96bLOy5io9qRCojjncgMwGlc
4Y3fle5B1QIDN3hsfGrYFBJOi9t+WjX+ngb3HQqE+Fmzt9DHxoL+6OGSYFXsCpDUaNfhuNf9bH6vtbRB

PivRJf6YRMa/63QmdqFzV9aYa+E6Gk/4Q7hXCQw9cNTwWn8n9R1njQXLw1gi8coNuCgskB8D6n0dkxf0

O0/q9tajd4NW/KlPWHwJC9h28CY0jkJlY0mo3d5t9K
+3JQ0yGezGywKL2BQJFcn4gDwFh8NPvQA3UVEZeCycLLIFJkK0O7v/hWwnmAWT/dFblfG0cbPR17d/ZO

yl6eycYLSfQiaQg57qHaFY4aB6LHwQxFyBon0uj9ufhrs8eTWyCeKvvE++Ag7k3KDEqwATdp8I3cysp5

5OZzgkOKstP6V+NkOBYTeFTZyXH4gz9IAPiQnylll/
Dkkq1vZUPPyppEKhHd0WuQVH0LqWK9jxRVhdF/eKQV+Nhn0F1KNXZi6Il6Ul98I0D2Riq7PjlZDAPI6O

5gIk+92nWa+TSIAKHSY3GLdIYNZEexzkXwB2wYvRXoSgF2D4A2tDTiQ/OuM8xZmoEyZIi/4PVh7wB6pN

v3UsB+/XYrsfD7wP/44SlOzEonNpvdfDCQGuyysmCy
aZIxlRDUwPead6iJwD7ywIknXeNY8LXyqAZAJeuaICBiaUbuHwnAcPC6FcwQfakq8y45qmYW126CCYJk

f0lXBo84O823NpVyQdAjEpJgVWwkMNAUCa0uRWQpTg85lQWaEWUa3PiYFZ7jd+N7LGip/nAhb8ax4G+A

gtD3hMTYpLvdFhZSrmE7SZ2yStd3mjSzZWnHIXe1Rt
C14jbFYD7Aqk2K4wulpnDfRpZroPXefo1LRbT8TCsCm1aRCXsJGa3Zt4KfkgR1KED60AsnZ7GORlzjEZ

cJHfLD8uSxVtWUlWp9keEbFmyO2tyI8dYSxlDJ/xEB7oFjclI9Gfj18hn5lM98lMyfRx7+z/rEzEwML2

vfbf047PgT/GXZsiKwJBiQLtIKH+z4jlGvK40ViF1+
vtzA58YwNZIlO/+eXXybuPornGgLHs6+81kvm8njHCU83i89t9MAZ7NnCEbL6LMFXvLgo5erTuBw/rCi

9Rek0Khfm+5RJHEtl2l+sr6zehAdKtXdAbHB7zgofyev+P3Oo9iRd7fXuBbHqcV4BMdXWuJlbN+gl2hi

LdezbnCuHkGwjyQAxknC3rpYQOn0sWem9PuwMA6q+S
2PColMyB9FzeQ11Q2zrzf5A3X1qz5su5rkNwGQPPH1+wenp+zUXJ1BCTk27jHYGKps6Kip/mY2iq9bzW

8w1X+Berkley/oif1WZnU+SuE3LgFbN2pc48vlHY8xlWWQtC+ww1h3ITpcScS6D6jnS3QgNLHbmgcykY2Gfo

79+7ce3U8245DjdEpq9XOvWB9aYobCN5+llvyhoWWO
s0I9+1a7hA5pWG5npF1VFime9PEKV5BC+1RQopYm+xB6xbjRKXljLIy3yWOHRipx6F06kdlsMewr3ZXB

5mXRwXXZJrzbGN1LEnq+MUPI2Hee76aTGWpO7IPCJx+qZoRC7qJEsovzgFiZjDGMV37y54TpQ9bKinqp

OZp8lsVlvZiJJ74TkI+plARntJqt8HAeL2i104c5M1
TVVerzuZTvzhGe8vUql/g+cTXVx207lI/IIKw80AHJuqLROwcMz2kMOhmoGPIKVe98utXMdEAqB5pNux

qZC0VJhUF/R5FRVz60lsGd7DUWJ7pNGtvLlzjzzNJi4Y33WfEFXRhTU3D+SyfcxmAOwB1RNiWyAZLW3+

JTkwtqvGEfXJrVeuVC/L+Uylv0P673jwVDuSFpdwYZ
AUUq1PL5nvpdK9j1DkFSA31pwpFNRIIwzyoAG09qSei7huj35Ului2EtBUePp+4Jm53J88ySE57/Wh8d

OodN+pGd6Rvu2X6ogKgR2u9wpVQGUjf3n7El8Rsv0YCCEA590xA3aFnSrl1GXG/o5Ia/ZrXEJb2eZEgK

HLhBrgBzwU5fxgyNIB6GgcMAqR/adEHnsYgx/gTCRh
lRn6YOcjrsc837t3eHHo/PzwlUFHGr2YOlQrEpgGJ/lBGvkUnxN8Fn/KU4n0xiKBC150yHOR+ZeF4hJu

LL+UyrMaR6+px+aCGU6XUyVeonymmCXjJmJpuj1xlPt0l0j5CQ0NOoiaQaeG7m27Z/SISNPeR9KpFYEl

AiqdjPeRbTWLcRxpmVxofjZ7DSR4kcpirJ0geBlAgp
oer35Wj51Z783vOlfRU2ehYeog++r4uAHkSkZqtUXzZVleC1Q59QaCH+Pc3YWRId6bErQ1hNtG0XID71

+e6O6V7NQa5SV8QXTfn75mJ80ElTngum2eb8kZj6JlE6qJ2614kccSWYOvlPwJLA669ShiaXR9+ppY0i

AggFTBJs4pJntUD+LnfgPhWQSpvEMd9COHYMu+C6TV
bEv6u3CCU02J/fkMH3stSO2MqAE/qkHKEah2tjeiRVsIFt4Gyp+tOn8bS7wiEDJxprPNQjrM4TXGTUXc

+PNwB5zuebywk9JRpYNZu1XXidmPeNml/+14APFJmlesb8Lvpt9RR+wjM2iWHVF/t18Mh0n6MUSpVFH7

6X0RxEP1gPdCN580aJa13MuDOS6vv0LIV+zV965STk
ZxtCOLyBzFJHVUP7QVd+//aKL8hbn2sIjr7a7Tysa46L8wsydDlVZWoHCEIOneIa+MEfSqBT3VjnT10d

w2zowSaxOxOo+NvTq4UvygFbHB47oluZ/OKQJbwU08yeQs7hvK8LZ66NUc+NfhJS1tQY3TTdtEzBM628

2ByJfv5WISlbb2Aa9FiY1YJuLn45R6/tX9wsfJj8c5
J9w2RM5MOomfJJX8vQDx9TBaZ1d5p4qh+twwovMFFsrG0L/KnxlH7sgg5/WDUdM4aTbqDmSrhpO/k560

0JVwEEm/UM7vavXidPeorZjhspTgvjLzDbwViCZLUlAP9Cyn0ecH/MC3ZOw7Qgxve5mE/spNRtSnZ5ha

gM8GnP/8/zRl25WLkmSqxt96CfX/P6xgIOZ9xRl82W
RXBeY603DggM21FRIEpMpP+QqDuZsoqE+xWa65wEg0LwbpHENBFu+ZOdwwQngpKM9+gLz02dwzaW9Z5U

8AiBFa/YCS/45GIa4qE4fhTT79ZCdkkGvOR/wpoEd0Lrk0VCSDG1eeK1TdcSg8NTm/q9+lx7ZZbVd4Bd

9kAFyHnrWfc9dl6HZhd4jox2lTgcgIIFYruokeh74s
sWm49rpAEIRwGX5GGfFGVfo/f2ecXPEyznN0YGK4Ox1AMFmE+bakxN5HUF2uQUT4ihckRmp5l5ZcByfL

lYaD8eQpoBb414a8gFG0+rdFecpJoneFbpWH7bPejizBwSeqdHie7S1RXFbuAK50E740g9SqArxRuX+7

ro7zFrg8+a3TAFjkFbbw7keAEjt3WRFVDvDae14Ilz
bSJaBW+5WzeGG85yYM/lq/B3mGiYOHeUrzmf3qUAvqixzhMDMoRsAXYAzkd1GHahIZaMeASRy6uMu0VT

Qvh8dOHQaMREoMAbdu+4ubXn3vip4YHaEWQlGB6zbXc70LKbn4dmdl3a3ZvqHBCrEczuO469rPoOJd7c

nZLVtQabBrqp8WsvhF/0UMBGoyNmmA4f4wqXAPak3P
FQL3GyRkeaOu6wHtQbYt0FkrP5t/U0zeQiLTlBKKhzAQQQvZ9m9GwLyVQxn/kYHjhRMFHRT/g45xoW9O

P6JYEFstIcYzvUE0eavmSUknNXeQ6XXqGNuG5YOsDmFvc0P1sYQ295ThW6UeinjkDX+hhuNxZg1EfFQH

5Lay2MI1im+sozlqG/dm8k/DxA0M7e9rscaObUq+y3
uaqSmtya4/MYSJJPENZ1jhNQP8Q5AQBlRsVOT0FThb72sgJxT8vPDTHxhbPX7wE+hkLVLz3NjMZ5XLHu

yi5aoilk70S2l3JtJfRbNdVHp6nTe2NqVFwtzGmCoBkmJoDiXX0nTrrREsIqdW1mZ+zkM40pNaG5+8ot

Olo8eqlC8Y1H85VIP45ZfffSWDER42MJeC/adNsLjN
64vxqh+sUly8ho7fBI8fUDWJMaEHZOtWX1zanI8FViBArpqVVryzQn0Et8GDcaHFsE7Z2Ms7s06nyuvD

rNTULGB5NkqrTq4TQB6P71Wdq6wFib5BEAinT6XjAJ1nvzo81vWFx/vQd7CkZ4yyKy7IBe4N0gyTj4pf

2lfJAO73eKqfHp8n5hDZMxl8k+jdHX//wzqtc5giwD
y+BBmfFhbDFhmUfHxpihfrHIirDW9zdOQsB+CB8r/7Erd4MEBsLmzzTo+EBKeUAMrKt3IV/CsSuts52S

IV8Hfb8bG55V46K6Z+iofRDad5O4SyxaBO16lTcyWeNbaLBsvO8s6SRDd3iaCXaWVz3agjuqw4VYy+N9

3YsYkzEW/cm2t9743WwRRN5Qi6g9L15ONYKIRwLsb9
8bllSon5Xecvr47C40Pf7TEHT/YeNX/BVKnCPzgubWm92M/Zi+qs1naWAY7NSFIcVKi4Gm4t7AY97VZn

hs8VeR+wWflmT8UGw6qxG76oIok2G5w6NpW55hwJubeM1dOdLT/VeoALzA7CsDx2HOnhQpae2LfWqGUN

iKuPRpeQs0r7da0mxP5vwVqsror/vPIEkRomzxtvlD
m+CIbEMtwoHhulzPdvKOJBvEaNcaNEq8gDGpb70NTi4GPqYdnhqFFbrH02Wi00SxOwWkDoQx8m4jYPo5

wHb9RkKoVvIBWF8OEhKbiXrLr+ItS9BiQi68k8/Z/EOuBAy8qWErcPu1jJuhlBWB34qeuhqA6c1IXbS6

zrUi1cD5K9WHJgHsx2h5gkT1sS9RxpAvlDxCFf18p3
cLPuJWit/NBqw0zp8WNn6CAWyPxc+AA3rOlXKpEndJTBXbP6hpAzUwsZx78PWAmXgTOfTvgEDWpGvU+N

JMYou+posq818ZnyjnM7/2idMf2xXV83UMen1wxM1IPKHoubMkPTJy4O4jc7pUVaP8TRjIytD0eHIBk3

uoasKIJvvT/gz9bFT67bLVGnTXw2ai/ldg3HTxOWbn
vMWYkLu66E6g5uZSKXp0ET0waDWiP5el3Bw9vR8S38Ls019kX5U+9pAJInsz4+XuPUst60olHnSawYsq

49iLAJNCOk6ANsgNjcthqs6EgOICo4LFrRCcwNk8v2XpasMJgJbAGCSw7FbmZcmliLTqXcrPMuP2AXcm

scmdDRem6kT9A4xVc0wjp2DOhXD+0FzDVkeSZEdxq0
24gSnkWqaUA0moL5Dh6zxXh6/80ObRlk2ZIyh1NWN86gJaAN0YKC6uatbQinzZ4sBaqCDxGtUB8X9YNo

BJzyFsRrR88E/LFk8R0JsTxyHkj7wYvGKwm4CGor1FphWio5SUVOANr5OC6Ji/IQyhr/LPPn515nRAuC

4ke/MUsaIaorXqU+Ge2m7w46x4nC20XVqMfbqtmM3L
BGjd/nCgR3ALJn38ajc8HQoGvpesNUACH0uAUrT8NRW9uvFIpSw+Z36A4qUhONUA/q3TwBjbmdwTWEQW

amPOEJhNhPXL8zI1H8LZE++KAtnYEUVjvinymeBzNQnNFUPThsIu3YQfSvMZb467voivlO/Tq1crCVH8

O9OmMebwlY1dts93pLN0+G+QaxW+xmVEwjhCh72Cqo
6pNXWMOiJxQ5Uzmj722x4Xp98iFu3yx+AjnFeeZ5mdcCoFQv6vRfZi1pC1OhNzj8tX7zpX8apuGsHQVS

FNL+oSvS/Sp6qsvVYa0BIJ37rovpDn4N1LksaAOvkoW61q5i57qk5lRkCumzJiDdIes6rziI73vvh7vC

7+pbgDEQfOBzMFuHSnlv1/sZbzXZ5/KznaVtFYsAmy
0ol8z78AlBme5/sTpZJR9l3tWj6KWwG83XQciLdrP7Op8rXTEAXPeVphbv36glKJGzUlnsGda0zxfRBj

rMSLSURH5RG16RPJ8SbMMhLaTD2AGWDjkNhG1G34+5vEV8IeW9Yoa+owva5IqPO6BZa84HzEPQYkrvyI

wzDmu7HFUq+gfEnVw3W4pEWUAcX3EpSCefow/ob6HN
LnP4oo0m05qioUbC1NVR/xdfDqRwfrmw+MhnA50/7ZZ44TJw7RaEiGBGw4I2zeaVPYWVg33o8gscJtkD

gzvlLy2b5oabMnjk/NEKG8h1gvvozKdynRHafYFesR0mYlreyEHfV74ejZItg+BjrbOwqbssq9RN+X7L

i5SmsMTi2Bd31wsEoP+oO8WK33JumNAYJ4ctWtSxSe
dfrEmkwN/mlXas9bCVdNhBeo91ia1B5xjCJcaLc6mgOlE7ybw9yo/9Bw9f1S7aC9Hzvx+N+02T86o4ke

y+kZ2UA16tS8K3qzRqu7CepLAuQ++4JgmmJHb1BJHkhNTWwKFi3mxFj3kLd55WTeLE+LqDh/npB2+9gr

TSyuFYT4tas2BVRwIvBlWt2j/xc+SoGY0090AS0Bct
3vOnTuR7zwm13N2htjieyyH49uOkWEFBNQ8NCStGJwS+2FTZvqsDpb7wfLZOpe68KLgWfKHl1o1ywWBw

t5AAt1dUEfMGzg4S/hvZzalYtqXf6MhX9aYeT/sq9cP44zkRJRitDozyKc4th6ly6zIxJgSr+QuLPPrk

G1varKlFJi6mW+u2zyflXXS+ocQ68Fso1E2Ad76+h1
TZPzfxX48S71+Skj29Rdn04M2aVmIjLOdi0dhtfnWp+bVVrd4YMgu9C38hXaVG6bckp+frQffnpeTAzZ

zTqP7d3sN9KXHK1Lpx+g/BZ0JdTBockXTEYqzMou+0z+83lcHCO2ddAVpSzb8BNKwF6r3pOShIBkOLGd

Cq62PBOXcP5uUcj6EDCG4qDqfuI5WW+23iCeJMoO+6
y39EoRHyHlrti+NIoHbEla+qYyOUXAOv22lCOOVVsLFGuuj2jJK4+B7Loa5UttmJ9xhllxTYlCVBO7Oa

IajYzjCYB0R3tyo2FtJ/5OoYOamxhlNul8/r2dQdcky+az2gty4W6JBdirZc9+CkjrflUatxxo723YPq

MS5FiuCTapQWNQ5eNcJ4JvUXMvQr09o5+WbtuU9pl6
ocJ4AoxiseBc9qtwgmS2f/qddVLluQu/ZryYQXy9ohPngqLEUhnK2AIQS6smi0N5voeFaF4GlMfzuGGv

egI/IpfLDV/4xVs9CstTRh92WezE3EIwdBZqHI62MygUYJBdYCXe04uLN14R9y+lblsfUze5IqtMa9dH

hw26Rp0P0Ik8IJfSnFKftDHP028ttzVHbZ/dL6Ocij
JrAbUuWiWmyUeQxZWX3wJjL1Tm36StUlS8G0j3eQnZDKVrWsCXG4PXs8KxKYKkpfoW4mlv1K3oEHIdW8

obAUO3W2aSAFOzn5RFYazLOdxwg6czrUK/tFxZ9bZK4Uigxe0DcAKKw0+l5aX7N2cX3lUYuApmPBwnRC

I7DkUOmhobgb9bNZkLOKSulZNIKS+y19C6lAftCjgI
m9Svqc9nQjglw6SWRwpgcw+e7hwVfutiAZFEJMfT6jafSHAAqiiez9gzUuO/C5f8Z4wUn/tw2ueBnlCB

TW8qpvl4csOE3E8dxd6G6ZtuQoRV3Tu3GHwSjX1cyhR8wuPZ68pvlauTHisgC5H31s3UVkuE1z8etxe4

ZtnEQxWhqW3FooJB5tgB/xkPic6fFV7mIx7jMeoeWZ
aYA0ZpchXOvtACmW32Gst+3PuTOYXM6KNDR4ZvaKIh7VoZELY9dL1SDbar8gCeCaiO/eHUZ1Otz4oll7

Kt7MDewSs+I2pXni5jkxyG1LG33J4ggF6peALb2U22VF+OB24LpFct8Llq4X9cfDyt57tnaYF++YnnWb

Ep2/qZ/qP7iObhnMM7YGL2luEevQ8xkjRM2IE44eUH
FgOBHDlLTBAvsXsSHrDwDC/iwFMl7bSxcyO+cdndKgAypqndcyQ6pQAgKO2gDeSGm+a29jqsFRfrBi/1

Snn9MJbjLmkGYPaFNfEbwVJznG0o0LOyz476Cq/wrB7FMx9wC4XcmgCsWmd89XAHQx+LFjc0P7xkAWHv

eW8bSOverZq39MJKCKt+PYLxGWrWlP/vhf9BVRqC50
IPhigM8ZZXAlkVSiRs+hEukbjC0BkzEr+EUjW9f79i621sAx+4eUnbxXpIxz3snNRJ5vz+Qy1LJWVdJ7

FEo2GZKJ/QnGHx/aqYBWKgoE0kAl7tesYn/mDman68XTDxLblPUf0eeXOYBMwKfLJkf9djEsePiCURKM

QrbAe3MjxHaESruo8+wLRr8jk7gRDYtzC9I0V7MFXO
N5bz+hTAvxNschf8RzuioSbbGZ7fRCmAI/bMSx+PCqY1Pmrgm9ax43XdUcL+vM3WzD72x4OegRx2wD+z

q7FS3ebp2UdjCYlRwIYA5xShSwgKs8K98HO+8E9ukPCwULaR1Aaqy6vbdybu6d6aEWHWxHfJWumAn8kh

T+VpT5MJAPqt75dZiGKJPX58qTmEQOHbni1P7q9Ng1
AFDRbSbf+W4EOlk5kIi4Hx1SFaPNg4+mxD5TW4U4d9fqD4fK46uQHIohPr/psEDjkh9gEai4enx0/fJr

mugvZiWQpdAbDGkooGj0a/KlzTmWRuLLwigdySn+/dZ7XQ6OwsYrQGAWTxd8p/eozFyVoP+/0Db3ix1t

pNTVdDB+TPfDTNbVofi36f8fXIMrKWQgn4y5/sECS6
f7NKXzLX83C3MUaorzdoQxsvYCyy01p+SxuZcEPgt8MFY0BbYZl1+fgaE7e93q3YQpD2GnBixgxHP5Cf

aJEs2siTcRxKKu8qfuOFbUFqhY6D/v9cEihIKcnIFW5xQjRJpNG06UiP1vW5pR0wu5gDSYWWYDVZV/zX

w1WrOXxwr8c7lW9oJp+2epI2uypJJdJa61Mxi1dK7m
rHE0f1JPC3sePlZb9cfVdYD+O4ncW6a/h62+lg2TfJAea7q6TNv7ZtF8Q6VKQ0LO8zRRXtNvIJ2BgUtd

R1R2Nff3kgB9uaQtCj3cL6QTniyOuOa/CqF/IQfS0X4fXhqN2op9DSUeJrArIQFbOhAJqvJ/NAXR5rsE

28on202SVYiw/90G8zInYAI4a6KWzY+cg9SDrwy+i+
L5rqCIk52G08+tRQb/9+e3+uSfKd2vK5d6z+vg1uPjpfoYhtU3gCCjYVwiva7DfUmIJ5PcDCmFUCZ8JH

gg/JownU2TVdM2ia0M+DkKlkt71YSFv3dIW72zUpteck4wtO6YnIcgYG7bLQDVbuLFmwtz/2N+ESTNfK

me46igA8tYWYuc0aVmf9fKXaW2Q8ND7flvzmZa+wxM
/GrZAYYS3vioMiL7E4pLOV/+Qm2BZTlJClQkT9Y+Qg78CGf4K01TJ+cnkSK2nkUlK7eqlF3ewuvcsEl4

xelKgL4JG9J+P6H0MAks1stP0Vd8wWYL5qOZaUzeUJREpH63kiOVi4uf6POgqGgUO130oiONrs+aUN3y

GoRgG5AWA5yX98eS1PWv8uVelxe/eaHsobU6J9iYw1
k2zOl2GjijvuHM8Yg8z91JdIzmt2nNcBbgr1TzXha3fzfRkMBHqCUDDm/YYJJ8Pdv0qjS4F1CVNi07Se

rsrTnOqdvxKBvRk1xsL+o83VOdRpS539xPpFnW8WM7+f2UIsD2kqikGIyyz5fl6VNT55DahoPvrlmbPC

Yfm+a3K+8VmkKdpoSmj0Ff5qdXbGGts7gUD2jx/5i0
g9yJzZ/K/FYsyB73tGpqgOEi5lI5XiChKj3a0iBRo00RtlwEDjChBu7UnIMjBqGOAVPwFvYL/WyDfVmn

X1r9ZIUbXbGjJYslSrvFNKOZB4WTrQUcHSgnP1+zjXV9sF/GyWEc8evGiqSURIrOH0AtJ31qSatjFPn1

11kycTVy3oP4xeDe5IpdK+Ta41z2tcUJ/bL0FyyHBa
9B+QHI7HWSJoKlkFFSfAaFA71MX1ZLB+2i1msxU0kqcV7e+9zv03gBOqbZA2DkkCFB04HYERc8iIawo2

bkFVr5dBhj6G3wQGNgtvLUwQ6jCVp5JcmKrlKK4fpDa6lXaXvxFqLtwDbEDrvi5p5MZhKrV/t0COHwFd

ViT/wbH4syN4CyD110/+tZMQsscHP/EOvmAb1gdaZl
nX5A78NHXXQEqzUmEkQzTo9P+Ji2yyU3FBg/DBJKn2wVhIKe5iFMoXyP6cvBmGThjpvj6wFnPcuYLQEE

VfwPSDWlWJYUswldJdAFgiXgs96+Nm6k8nj8jinlhXTgKiiu1zkywOQuGpVo+86A1tp60yd6jnVz21Uc

rnhuh9B6Az2B2WeRGj5BMGm2TH/fu+wO4d+lX28+8N
APbAWAaBGfIh7yr+bxy7TsEHnBqyIca1h3nNcefBiMosrWwnlXPEq1nXIHewN+Qd/3Vg2PmmJ2JF+L9v

BWfaM9801kx+Bm7MNl97fa5ML9LnygD8rx6jhGmeGyM3xrHj4alnoY4gk6wiMtqGqxCUSx0ccvVgXgKx

SK8dYSqXDdSQRwCux28lbHMmBrx3Z4uKyVBhW9i3mt
rjD1gTk3HheAMY+Zvo8tAemTmj1RwmNbFFxchXs1VqsB90TI4rEIWHG9ITwsHVMsulVUKZ4ygqNpEYzQ

6A8vesnT0JxYKmRiKtKZGJK4AFexrvsWC8riRm0dFIFp6sLp7x8emNV/r0K8QJcFxIo4U/plT1eNYvV+

/NDi9ffM4Uv/tsL0ANYDdQfJIZ2p9zmeiLOFnPqJLM
f6IE/RjHeU8D7/tZ9hpc/g4pxT28ivCiZuprl72vm2vSP9UDjT2Cw9i3xlzjYcW8dnZ0WpVlsrD8LTzm

birnhXDFu7kGo4KSRAS9HUxmt3bN/ZfezSNh3MjpFAM1ThLIEI0cpHD/p/IoBU5c29oxS20QuScoLU4J

akl036aWn2jgDCu/BshyjKd8cd9qUYmU/FGvGtQdVq
jsxQX2itogVbd0xWXN0+5+EoBgJGr7nJWUT9RNV8KHiiQ8N5KLoepa8VeCe5KwTo9EMp5+VFB2AojwrT

bvkmkdo/8YkjADuUuT6oYGsFdTUYjDzNLwGYSqxC0msK3vTyy42u4SpzBWiJfE2AgV2pAS09mSsQr9D4

itGjPgP+rz5P/q8+G/Gq+gM9zEFqsP6eWHQi2xSRcR
EvnIPIRs7UgaSNBcMUm3tbF6goeYiiGWa58L1hL4ioAlpXu+/yDmHNDWBB/JlOdV8x6U7K4XToSMsrOK

NrnSkcEL4SwrirddiCW2+nTZX+HTw+4s/VVSNOw3iDJ+/SD2A1VfbhFbpWSxpay/YFMAt2cCVCZfNi1/

u/CYD9zOGzW3EIx+1oqiQe1WNvcq4rOxo4jaBwjMe/
BQkHj9EKE770krIU6vNFDf2v8ZOL7p7LMhwD25vwVwk4qCj1TyANKDvtLp0polZaTjmx9w1J/VFs55/n

/36CQf1UDRPowjVlS76Cw15Rk/JdXBJUs+xdS0N21jQrdDjTXBawNcGdfP4pvFL07nDuyY417W+l8Bmi

0CReqfsGY7zjs1jS7HCqrizqMOEIQmGUmXVPEmCNWN
wK1hxjIY73BiULjgduqoDowNPLuIi0ebfe25V/3ZVK3w+vZr4H08YLgSflgPewRJJ9P4AcA0TNMqg0sn

CkQNbPVbpsHiHJLz6xGmYoYDHnGvxTeIIcSKXshBUkd9KHWOpziF0lv47Varxlbh7FY//tjiT+haFUtt

/nUXeg8ki6GGD6sesnELk/955hTqqeP+ueD4wdnIqI
qpcBBaJnFBNR2F+Pw1kNetlN4Cj9F4tNjJC975TWHoFy6MRA//94+nc4HGcqnjNGx8cc6b/E8zEP+Ccz

DkmYiMXCT+6a2iYvZo1h9V6wL1GIL3tsiKaCuI3YR9n2bOsVEjDcSmbu8fXcejcdIUVwpIWnZyoQQ9p+

944ugVc73B8eqbXtqWIg5p0gjf6we2yE9a2wlE8pgi
z88N+JlcNV7TJHeOnfh4CVzmwhjdzYv0Fo1MeypQBfweE/+AIs1huUiBEFzrBZuJ9jT5mMuSCbFBXr0i

EQ6rqX86holh1Sfh1yR2un86b8jjLbcLdk8soKzIjlztFj9EsJhRKF2sQCg8sH4Vu3oScIeNNdEhh6Na

B/wZY1Ifo18rR7EHSsPfG+PfL+5KhZ4+tcKt7hH3sD
Pgr9GsSzAeW1CNLamFyacZmF0hQ3sRqvwNbOIM3Cp3AAEREum4Lc24JW6wcK8YkLCUZ9wUh4caCDB5Iy

LNSqoZnbPd/y87yp1j/knmrzDEjcBH2/MlXXgz9KMrVoxysXGzv9VBWw1jhEF6GJSD/kRceXe+FZgYD5

OQvgrnBjvUhQIAOiBqc8adoaiq05Qo2CMDHxLBalHi
Wbxn3tP3o8RBKzMXDSyHsmoK8p6oppqEWxMy3LXfvgy4OKUDICgIcCg/RNCl79cxSoo8kO8VMkYJZqrg

YmznJvnSDvISEdfP7mBeyhOro47hP3Nz1ga2D3RfRef05I5gqlyUcFmDqWaOhL1IctJ/qa9JMqGmTDh7

dbsn12FetS4kwHs3TBcxPZ4J9uteF4VMW5mo5J/Zuz
ImaY8LlKchHxaOZ9HkH0U50hiFiMbJwrM2UdV3xIrQ5pF04xur6jUXUFJLbuk4Vo+8OY5Hd/1UOJmC8A

VlU8fCnJ296Qv3NyIsF0qa/Tq3+b1T/WPdlg9iJug7eBhANgdzbX1Fs0mkj+S0xq1lRi8Ndu3Z9GvKRK

RHyqzu7csMj4WHSNHIZteD6PYEa0iI7CoMfyEJ6Yr9
DNEl7kP7wQgf4rfwBDQG5uQpJriEn4RjZ6j0muopf/C3bJyqnZPBSjYRYaDivn63rF8sK9kzU6cvZ2gg

TD9uRxjRLKw349LvqedYJmP+d35NMdsYFi6QFt9RNJDGe8eEBD2y8lj/1JzpcJ0UIGqaS17CLVJLdkTo

iuIWlJT1FJIyrDUY57wXF27RVGqUas47Gc81dAsWrX
L/DS2G7k5jFbqa08SVep4DjIt03yGC7MnNLgLwZv4pzvnvJfR2FfPYtUaLWnf/XX6Y7aDt4qCH0UdPqj

kh1mW6UgobxLSnjsL9M4nl10jTH6rgAVLEwRzICCRS7Zx+84ymPVzDXzhaYmvOC2hCwl6WJKJIzqNG59

bTMsCwN11M0dj3P1d09Pcd4eVpqmz/R3Zw6zEnR0lt
076GopfaUaBeWYxPT4iEbkdR7Qu2mzAdrVCdc2lKPGD4iKXv5o0TpuPjzsBzGQugZIVXksm3G3ouFSWw

hBdQrPdP7htZIeP99L0yPLWSXavYpCMQ9KlB2n7HoEzXjyVGmXps8Lhm533LOcovgi2tXNw5X4PmSTsG

5XTd3jM7UPC8HnmSZEvMYWlE+O453iGtTh416Rd51O
DaC32mNqIUi26dmyNfabFEQEoJqVSXLrcD4RnctHvgzWWDh698G/p7tMT2PmNU+1Jr2NtD2Fxl/9Anzy

IxVfdhNxNgt6Ve2pSfq+QOCT5BKj8g7tvTA0IsO0qIS0c5ZF2Hv3OJ5hzhZKXlDPlkjH3VYG+BTdGv7i

dWS8icqJ6mrzD1/dGks+mCh+kNtWppI+5Di0J1lL7a
LPlQw2bP1xd+YO25g2oRWNDJKh2G9Q9yMhHJ1Bit3Lt8B3r0WmVVHmPFHf57G5s/PuSi0fz8MMIic84g

ojOh79H6AhCvWB/3/4Y82oL36dxKVNXb/UpNDgDd3kX5KcFm5QcHbzVLwEYU6a656swcLaTfHgOGxSF6

NeG5OI7/PTAnaazl5LSU4iYEN9A9tn6IhnFDLhM4nG
tDb36wCXgYe+Zg7VkAQrl6K9RO759EPEQJobBYUki/i63D7/1E8Dq6m8l02vUa33+3JWNjzAa967XwiZ

BKmYk6WcJsTFqGbjDTW39cVQe4hzb2Rb7EMK0ok2ewfmh8PX+n6beKLsaFGe0ce+M2qb/R+M1bKdbWAQ

dtxn2YnhZJQuErccVS+MDyG5DY+iw93XFhMMVGbdHR
cLvGoM07me0V9+0JDC0HRw9o0hdMUYwV6QeROsPWqWqpeZLblhqKQJkhx+udhra7hfL2hzFHZq+zfcYc

j6/CsgGJedO8gVZWURrcTgTaVnKqz3T3orQj0ut2gKWV8Tm4MNfari9GBzcvNaF4SLxRT+if0jOuyTv+

0zkp37PMqGAPcDl1VZE9LtzM5+KosfSxDofYJG+qDH
3DS0f1q6BplzE7NATsrhRDJgCHy/8OCZt9rTEhDuJKM112SQhfm79iSCdTHgHY5Oi07PteWz2kd50rvP

xUv/5CrtkllaBIuobGEqSRcntyb52H1o7sXD4tvqPjbyWR52HIdSSVFhqGC7znxndlOe+9ILIwDG3N2Y

oEe+Z/ElUwb7Q9/9G/ROLP+RgbpJvIra/Re0ZtXwgG
5/nUbSxNl8cu3e4ZlfnIMtqc8OCMY0EmCESCv7vndQqhRzziRWpxaEVMsrnW/9NM7UanWgvXgMFz7nL/

4Z4REBfkW/5r8kdS8/Cn6KsuaQDIz2oOprjyUAcpcpzJE93aV/lXIAPSEm+tg8+phx3wDL13pYBYiyh+

kbKtpkAzBnxJNUNRzc31+JkCnyyEoJKezlNwj+nDwF
IZKE5AEI3VBdH+q8OBwBrpSNR7FFrE32LUng9SnMhIMI7HMbXuKVPu1fF9nG5PTaPLs98cbSglqoH4MA

nJ29iewv8UBvyuObIF7iLMhyGFUZfUvyE2L6xfOaH699QOLIt1ifMSsGXA39zfs+NGOm6iYtsC8H8i5j

ZsgxQbtOE87LsStlwDDe55QRo72WXJAY7XfuxnH/br
M+FVFTKz9D24B0l9J7BT+b/DFjRRpXtXr1SNKcBDIjqjVBMPd1SJ1DCd4rV5HWixDegQwupmxP8uue1r

zSRwev9gqTW9z7C/cGCwdUVRD8aSziOcsdqZTbKNU6LDiF/4yZ2zTnxqTXBXxzcwK0YgGwDtPemaZ31K

B/bMv3jwxr5Cmt3oTkBvgVltGlo/lYmeI8EIwvyDz/
zj+0U3tIPNE+DMzisv1yHi3ajjGlqJYKPUrTTpizQboHJ/1ZfKyhsXv168Ov609kpHvRcldK+F9ZbzM0

f12EZO5bPc4nspJLxcz1hHgxO1OigJ7flq+e3bg1g3ofRkNKJ8qC53FsUDLLiyYpQbdMPv32ls88ynKA

BH/md79pTG0t+A+KuLfz6KOxmkLQMJrREp68TKuRhx
xgkfy81jq1k8YnSEpVe+/D/Ykidt+q2bfhjRCA3EKhwcF9BlwtwYLsqr3vTRYaEAjTwxzDhhngg9wi24

XArYt9tl2kM+TvSsFhZx4Zuilla69N/EmcRgHT7w4Eb6qqxIL0B8b6lAdfi4JE91GEQa4ODisS+yinHX

EZ2x+gtJgBziYCH4gc4Fzi+xDWI1Qs0CrDSqeYh9sO
GTdf1X7gvgZpWgusq8hN6p109Iu3UxTaUrS0uH83G4Ru8+e+VBn6cYscf5oK263sd3BKh9m9WZfoXnll

v3ZJSzpJcmMcyjEo5brYwd7tiBJMIYPX7KrJyTlg7AUTrR3MqxUepaH9u3G7/truG7yZ0blgmiV+iuN5

KXWChpxkXIr3265HA7kbouLRNJBwsSF1e/hRgKTCay
Dki6hF/wrLzUDbaoFkdUlP+YPcM6fy4QtQO/2iiWALCS99V7B36ncJTDlTlnzQXRsIpaemVm3MMj49vQ

9c+xOUcZx6xpS3L6CZfa1BCIxnwX/wwKYPjWv6OAC7/RLuLJqEqx44fFx5gXelPrKspbk4MYs2WyhKFx

51O2ePHdWdBZ3PTLw+bYZspuQCLgvJZNZqVE3aPTd9
47i7PRqAqSIiFw92Yp4hbjHiqNyfM1PF6UaO73KMNQFWcWcfuZw4XsVWybX63sR3vUcTXHDYNH7JNCP1

HnkU2orNmsaRT9AE340pO9I2MSrdEDZuoffGAkRHK1Y6N8E4sdkuJH3ulgJBA+sCc2e3+8oiFZKp7EjJ

FCyCZNXQ+UCiZZ0tjuftdaVFP5oi0oYYBSxFsxPvwp
0IA/f/q5auhmqfe+VpWJOHoabJRUQar4PpWDQSUVWGguYi7UVHOmpESnxmBBBIJmcFuw0bB+iekRqRWH

67Z8+ePWf/3z/en+++2057hqnThExS82GV9lpM04WYK1rMhhT7DPqVOxz/0QUQnjwSwcz1L6jSs2+Winsome

1JKjLm2krgDPsNE+bgQAiStPjeVls5xCNwox5wa6x+
n+vyCtt4kckw3TWEN00kZAze4AG4Fz9FleuaCwM+dxPa9Q6KsrHJ/wsxliW3KrpR8PHI2oSJ/lWouGfw

HtTslMGJi3SaUmhfrVAkPe6SFm5fEQo9f9YvJMgE4AJJi6oWFlJwco+3oKWcbxA3MfAXQ18G/1ZNhFYX

dGoOon7VHEFTC/OHi6qG/a47IYPHrsc9IAI0MaaVdI
AX0kfWO4eIs7ENS7B+FEKEzW5SsNAcgm3KoH2NYS70Ism1PNjlYhZMvDNXx+mM79/kV4Rj+KIW73i3ld

Wt4WQPoHQKWmznvL6Akto/wph+AbDmK2LQAhmNmjQZ04wY/5l8pYQakQ15T/tjl262YXVk+puxL4rx39

T3B/JIGzs3grv2bpgrIqLLLjMdSEoAzu3hh3Xqq5z7
Fnd7z9A+f9qgT95clJtA4HpUODur4bikp6/meXVdMUGE/gkZbd1D8dDZxesX7V1jfQPzlZJzdb1LaC5r

FLWLRfmxiXKmULLuKlC7INaH+GL6BCK0YOGY/GfwU94M8Y+wf6KXe15ojr4xkCdDY3NNjq3mD+nwNObp

pTBGHfMp/HPBQAKOWcjU5kjQd1Q75gALbT9FcVL0sj
j0q6Fn3f/dRQpIHvs3ao8vfuyIB/ERxQg4Woh4GiCCSlk9gfbqUfaP0KGhfHaf1DJ+VukgpAqCj5ViaQ

OcWD2NUTRgNaYTrzRt1Co0O5v3eNM1mj8QY0WKk/iuwuIOh1KqKC6dtj6TpxgcHm4MIdbowL8fAoUg5m

u1CxYZaoVM0MxepBtSNBG7Fc5/UBW0AZX2UkfU0onx
skGYbUfSAuSoZ3f5bd5Vg9HImVQKwk1ANe67bE6iUo4LJUt8cf20Jnc2uL4BCH8dPPIGPpWKLzQ86iJ3

RKMCHHJ35KPxdpDP3RgPt98YM3dAe/aq8DCrXeVYBigmHYPM7ooTELwb/D57EzgWnBXzxRZHojCzG524

dwSU7fqPRD99wcxQdcq3NUYAs2hkqMz2QDpGrfM4w9
GX9cc4FCDfJXel8IHUY+e0s6kkRGBusHlCKQHPnBdQk2eioZFlvEt5YrWs6YPQtw0evjwRUg4o3RJYTv

kx12lr0ZLxEaGm2kK8qetXOKXk+gcqIcaMG0pjiSEurp+LP7capHWveFuFaS5gdPcWtKeo2xI6JiLvL+

EhkvSylSZnF7nyia0ABQ4w5PBK5ROlo+0fQA0mF8rJ
zfg6YaVzQy7lHSOYOqOI56y0xr0XuI3dOWGPz4pIfIw0dDG/SaX829rNtbNx5isadeJJ5PC6ZR1pDnQA

fXg4933+6OLdOkc5w9SJzioF2RcVHErMoPUlKJ0HOx0+I8dj//AbwFn1bmEDzGnjoIqfOTBGGp5IMW6z

fdUVDx4+q+/VppSzH/opi8rWzML8AU8bbJ50oZ1Obn
S6Cv9i/8RByvmlw2ts63mej8BIIBkG95xW/u6zekijTGJnsZAZ4V0w4sHFieBcl6Pb6rst37mrplqJFP

lVOaXxe87AAP7HE66ag+5Tzvhb3RGC7AlYuV8i3wauASWIggRwx6hLt0S4W2sI5glQFqU+TdCRpvwQZg

UQK/RDWizoBoBg0l8XaJl38bDzELZ5U7mS4h+i20lv
J0Qq4hedMg+2qikIki6RHbGihwC9rshaola3BrXEM7KnT9wcLzeIc4gchUz3+Wh5L9qWlz0LF/32570i

7bRO9GFRAPo2prGywWfVZDbrCWpr+RhmxXpLikc1zS9I8zfgSWH7hLPgDvTE/s4pRJzAzxii3gJucHG5

zJzvalJlvZLFIb92za4eCXEklIIX7Stxz9ybU36dz5
tNi1r3W/PKBlyaxSlEQtbMCMLBr05Njmzi31v9BL7eGbHdELYkM7VuH99pczR2qKPeLSspxGhFT/6dt2

EWt50p1XioREa/yWgGf/nkVKPdEhAw/7EBiHchoD0L/Pwyo+P+GYX515B99TXoGrwq99544RXCKrSXh+

269OlRaP9mzU5KpBYSd84Y5Chftoie0cEi5X6baRr0
0E2DV7FZYoGYkU/+9JzBhdBONgQFgzh3jgmCnTk3HNKz0Ask7T9q8MQ72aPMq+8ap+ihOPPT3Feu0hS+

L44ZVS/w8oISSCjSQtHFZYrSKT3yREYJLrimBh6HEu2hWxpxjf2NgSJ1eoHJomdE5HBZL92QBcepNTLd

z/WnqwIHl2zk3aexoxLcyGSs+oERPaLF4Wbn1DRyvF
1se3fdnSCw1EAEpSPSS9zPZ5SHIZYluG5UhAs5tR87eCbB2btGYUTjrMs3u/vqZCHSPz3H9U3zARgpjS

5bv8PuIZ+dh52PtPxLSweOkeAv/fA/xA+3IOhl7pqw+b0tL+XI7vMmYP6vOZNtttDn5bbgEpnuIx+Hxd

hYJmikY7nhJEHxtvUrwTgauBKsHqkMDv8MgLYUObXT
qmoFQ0Bdk+cPOnLfIBw/2uPBFnB1mDLZjG1m9XrrpHAslbBEaQg+NwDTgsaolEWHUvC4+SE/mAJuOcoq

cg2ZgWW+kIUwEb9JdeumEsdpYJXT8/2j7bMz7M5CbsCoMai8Rxe0IGJ8NSeb12bL2Z+NDn3038xFPC8P

ES+pYN+vosDIoCCDneOnubffPSLHR/dHca166G3BhF
A+GMUXEFdlQm6aSS+yT9tm007cccDoyDrYaoIvv5yPLRfwaoiFr8x5qZZFzmzEB4faFc3jUBdeOZ1Xv/

XGJEO/LHomRvD0U4bjCjoT1/5oj+FnzOyu6mVkb+m9WWKak7zMQsPnbzszpoT0tdD6mqQMx4JVkD/8yn

dX9EI/WHgYYF9nI6czBfdFE9wyh1139Jhj4+8q9HlY
C64KYRZYuklLHpnNVGnigqVP6RkS3ub68qr/2hgo67ZU578huiZVnvm4qX2V8ni3+y3GLu4Hi+udAf3r

J+GiRZhjOl5RigaIx57vY6grtMETm5EJvj6rNR0OmvWaZSw4DIqyUl+A7hzoTH8/KJuP1MjAz0PwWAnC

0wDF3WdaQKuqE9b4/CBmM8RYdxv/DRSbFTXGCyZGPk
HJxNVjlXI9jiXjd4yZvGgI7GmPnxe3WxrCqQsPDi17LYWb9s5m1ts5LJ+OoGBPhgX1j49CX+7Lnq6mSC

BmrZ2Sf3gPCzLJS1PvXNxYmqLisFoqKR3+/lXyZgtaJH/eD0I2pXN58XbZUeCQ6kHXr9q+HAcQgbTy1Z

9tVTl5T/VSADDhI7B4SWweUbOjrb2QPTZf64pROMC5
BNTFSqr8L6CiAr16rMX1MnADIhSL6HpRx+Yj7G6uMIenAmZi+nd6IOSYymFsEs7jq5SqdosGX4a3aY3k

t9K4Xd1sj4Kso6QRtUABecnbz1NGmKlNnf3E057YBYJ6lRSScCMZ6C7rOsjPh0f3p2/xotOhX8zZYIZy

65FZK2c6buL2i8kQFJp6rxJEbIvyGrW6O9bhs50M/B
FoxNTe+5y6VSTiMW/FaR6PjBoiY6H/cztiT+xamlNufl5zTpQ6TarWwOKW83uDj5PK4oNRSdY/xJEpyg

P3gc4TVZVqVM+0SC0FC/Wbk4A/LeC5jHfvkyUCjz0qUpySyDZBgrO2TUvkTfEYpjhy23KiV8vaTcjeQC

+Q9JZ1JyPH/PKySPPG5+9ZIXK0K/Hy4qeAeW4FOhU/
wRd567RE2dji0Y72cTQu3K+LjuAWKUn3d2iVeoW+IXjCvTE1gB3rMnqGaC3jOTyyJXQtNoTgJlKVqEVg

QXxaL6pgqnG3znETHCY2MRZWbLV3U4lHQKK2pMATfmc9cvJXHbayuoce8jfcGoA/lRRp7H7iekuzewht

pd623X8eqs5nx0r9pFVqKjzkeILOgu/+lLGG7oTMAj
EUF3+Kyj6g7y0uWuHvW3bzHiydIjAfulJjy/k2ebsc1GMExyT0UCNK5ZC/4xroR8ffAvhc2PEE/hJ16N

fJJHb97EYUdqNmyh3UI6lH4b81nOlLlN6ziWrOGkBW1wpV9jC1BQ07yHlW6oJibhIWPKvwVgq2xWfdO6

awDPKiCw2byllbztOdkfKKq92cErPhZ1iGnhLWdt64
dXZO2UBBV7mUSrQZFH/KXCtUxxfl1eDj3Rr7Q2OQetsfC/7kuk2rX9+mSC0nDPem8Zei8WrIdTLq7Tgv

nmsska/GNMtyWeSb6aI2X4UcjwqeMfzsEOF2xI4rm1uf8Qk6wBjtQgYw+7rHICOOOCPXm8HPlxVguz5t

9JUu+jdusvL2CkuNTctvnWTr5D3sQ9f2zD6woouKoN
FdIV4ympbWIXsa8DxIreg+ZHoCSMGxFhE4i/whQ0E3jgKcYDhSn7wtC5jfvXZdCHrSpIOkDvHZtD7pcI

kU5ebWGd754UACh4UP19oN0J0o7OvhVaBvc+R4dSpJvufoxVYloludNjBrqfuBTmMPYJK9S/cP7WZLr2

wN9Pp0jXD1QOx/vAF85ikTYHjEy1bUhK9ZO5TQ6YaG
8VZ+KWBRvqIlxfgLiZ75CTP04XDUqpFe0Ys9mFIsouSm7L4KwnJoBenT399VQsYWdra/UzjE/0X6Q8y3

CX8f48aposUjGM7hTOxMnQMYaFkx/nDcdXqZk+vgZH9FVtcSJg3VcxkX5Fw3S0vsOjJI8AiZT6wYU6WV

zQtCSO6E9X2H0TB/yRruN8+4IU7bhhifl+MOIB6dZH
OWJ8wK8iCfJzYFjLPxlLTk+0QbMsCfVC8pJzz7EEvH5Nm0rcQGIWAX7jOz2/KvUN9U0b/pZOfW1bBFxi

kzN2jroZHQDyRaggxScBQ5h+Bt4BeJ5MLrF4B1BqHHqTuhWblgI4CFW3X/Zb+G8Y2x66yAcNIjkwK5+X

bZyHeXz9+IxAnEmbhRMJ8EWCZ02+G9MG/m2ny3n+vh
2FUP5u7pjiuHvykgoa/5Zw2C3ZIUyTAB0IwFkQFmQ8FVN7zUIH0T5wNAs71x9a6HSjMwyR+m4tnacefU

epdB5/wBXUoUoZ8LwIGTduwbLmgnx/B4cJAgTPCD14w+BqZg280/Kcm1nBIqlkJT5FjLKtX/tDUkUO8K

Zbw0gIXSky0H1TgE95Wu1zl1kAsjKhK/1lECPllblR
4H65sXciT6qJSE4QQsYwQkcrnc7liMsuFUxQpFLx+hWMltucNKV09wZi7DJwLyrLYhsx59Ru18KIIk9D

3x5rrmJ+DazsdPH35PlHbxbUGNYq2tL1r85MJxkCJpvmBeag3b5mKwyIqe7duUCL3kQZesLtmeezwQx6

/UaNV8XVxBJH9PrTwFa2pH+45/y6mTIuwnMmdEYfmC
KLdBm/OdSUZ+MtCSxAcIMdHvaMb+polfR846ydjEbwph7of+lisvWA22QEecqphCeMan0E70InXMYpYm

3Qc27tj6XeDxe93+rwjnwBVg0IKU1w0GYgmMaV+jLtfGrfDWt0493QRxw82kTSErWPnlMsPhh+6M6knI

Reo5emGYV+NmGZY8c4iWnaXq4/5ekszQGr348Lwd15
UklbMXT/OycoD3HufJSoc59yKXnqnwKcYc5d/arcy1HyTZIcj7JsWRqv3OuDMpkY2M4eg3II0H0of82R

u3HjGS6xK90WAO98r9ISC/VFKcAp4wz3rqqxmFPL5zhPliq7dllBFe6YlKIq75A3P+A/SkgwzvZylJ4+

/V23UETMV3Z0+o0hCOcYTOm+9gMiNqF51oMQfzaapl
7Ue4WOnhhQGxCm8Mug2fzXO7tvDyeU7NIaDXXb36zSD5Bya5YvVqRSHEkc+OfC7VzRigbDc66vuhPOsn

6mHIAe7yKATA+z29n/BnHju7H54oYyLwyYCenlnc3mYcm9Y9DOyivazaN1dUpKDAYB/HVH4XIeqOEpaW

pj3BGkx6Pe4ST48RU3+uurlW07nnAevrlrWsSyTvxN
E5JrKS+jSeYq4jWW1fuzr4WX/uSHP/7BdPuk7c8lseQbPTX23U2avnMjqSomjJm4d1DvCbuIjovjiU+h

KKqGqBsWKQ1XsiLMmMJPJdXbKN8m9sgw1rZQB+n8JIL+HvAQY401Qxo9LE7Rci0QvVK8EWqZ6OC2hUfI

pZcAAS0rjEerCUf38TyEvGubpGSupyQvILfEyngwiU
K/GcO32+NkqSpPdPGp+ue9XFRGIyyN52n/u7PFGoS7bOdIinW4pkNRT1QexzUdlatboQFYyQDfBLJ53Q

qPzcUUr4cEGa01DaQ7dlJ/vW1jN8CxvE+9SGSI6g3B5ZmvOPmFkmqKh+U7oCUf2nA1P+589r4RSXoGMo

fQvEE4fhgCRy4ylge2yIHaAj4lrnjvKrMRknIbc+Ni
dPIaCKGXCLCJNrHBfHP/919wSiKf6tsS0AX052d/QNsinV+MEMvwxZzpynnAuHrNipZjGFYzK5x109Xy

7hJV2xnREpoDdyQkQQU45J2hyv7E1mL/Lfafj16GVOC4ZdyxrZetimqVxp/W0ZLGurUdqjPYoytodC9A

Jc7YN/xtU5WZexR0WLYoXueMogdU6dGCfvOg90LE/R
odTLz1k9JR6huAFS2DJjG6Vv3GP4w4zgM2H1klh6e8tYtRWRmKuvoCW/HwqfuW7ywM/pIJ7r2OGd7eJd

+OIcUxBAr3pBRVqkeBwy0mIYVnjC/67JEP7/qNGNTSFhbqZqt8zbRk7W8mxZh438odOalb3iGq1BM4LF

Eo51o4pJI5oKFhaPRohpsj0rJwNYo/peFb9QSnmK2N
iSautsjsYfa0LzmKWToxg5JW7YdQuObiReT3iPZ2NuEVgtjFEbKTpXo25hgk7+2Z5ryzWmBWZh+cVHuP

CLqYljSL7gGhdbQq2VIa2IkBzkIuDEUCxRdX+y0tdXjO0PFEz16lbB5nYi71tHhG8Vi1XgIBYvSkA62S

NpWgI08/oFeeuqK3/6xVN7QIpAKf+B/ELcY5aG8JxL
Y0OvQM6USdtER6cV8AhFDOGBkC5kJeBYlWQ+c6uDhRWT0RbOOMPQP7E6gP8EJ2yjTh1YMljEn8pJrMs1

4ti+YempFDTDr9TGXmuA3Nyqk5so8vhYkbsWjY9qyccWPLJLU5m0jXt77LY8+VxFI1l97T2lKx3b5LKI

/vx4rlJynMC2DioNtjz01mEO/qtOHg2nQ09/n/SV8z
GLG5D96dsOPbABOwVAQKlZAnICwkbXc8aj8pD0cmz2E1Ge4otvCBHO0MgWfQ+gfKTBu7uG/F9NWtmLfB

iVTkQ0vTaKme8cqvaALRLpHVIXFM+xukIzBJtzXJZJzeP9+Zozqzaqx22DJ5QUdq9/nbAjEZBQY2mRVs

WuGGGVvXQScd8Oe8DqrYWa3AJgT1k+y+uxH/Gb0sH9
jx99MsY9jyugZNdJSx9a0h6Fig+Ilp4MDqj5pv2dBM1gyr4uDXq5sOXC6OMPTVdpUIq/Y/4HpMf7uj6D

f56CPiIptGPemXCEhkjdi2nO77Px/uY3fEZdfVtZHl8rG//xA4aUnGQukGqKSS1cE+eTqNl68apy4Op9

fylb9NG7WgE1+88ew9MLCXSiu6ci4P+1Hmn5/RjiWE
5fazwTMIXb47fGBNr5gBf4nFH9EwTaYogn8MH8avg5qxN02D0s8zKQGA5xuwdM0obhJmjH0SAfCd3Lwk

YPVlA3DNLFpji6uUETs2YXvIacAnPUnbUopsz1zj2fmaqDhY+n7F50287fWcezFEsa2WSZI23+fsthHb

jGqQU+lgcx7FqoROgVBr9xAlm3w3zdLnSYpuy83nZN
pWRpq4rNNHT979ByzDUezrLIz9E/ZHodUEfw0SmBvkVK2csL3v1ZHQtDSl1acRN2n6Es/+9MLP9vqBKs

FhLxxs2DWcYZGzsSscR4k/mQ3p2Ug4mr6n08KPfRgCKcK0YSOuPkID1yS146ZbhyADBjvYg5wktLTH/0

np/To4dw5a4v3vwx1wyysppphbD7K1T8Za3aR7CLcf
eG7nYWSYUq7IydK/hWo/XDGp/PxWbTnl2I7Ulxt/5XKJ/21E5C43K/Rf5S/IK2lDwD/m1amMXbpvLWg5

YF7p0Kj0AmQz25IM0FxqnxaERNZ5acvUTtxyomDCh0PNHOKMrZjqzmS71KumrCGHeDdgqP0283CTZ/2u

stDbDaBL+o1/xCVqdeJu+/aclqcZVwk6vO1lydyC7u
yjn9UjjzgTY0RSnExobYIZ0ZMytHNy4F5Zxd4rgYCY0QOWHO32wUkzI/YRMPo09GAfuBxysTw15FL3Fd

53BIOyypkdfuS8K+0xAZUKCPW3ulLi4jsEs7m6KFa8FCSB884GRYe7Y61ForvYRO+mkFXv6e1asbDVNE

0n1wZiIN85DeOy1gzBw985SOmWwCml9jk925njKFSo
323KIS8kkAXbtxNkc7youcofjRp1OFuXDjacX1zkJAETlxc0r5rDvvSTU9DFYQkzT3guhZAETMZrr573

Gc5DAP7+jYbKWQ3nZWYceLcI22flG7QppgWpsNW2fhle1T28QvB35GtnK4CI+eF+U6J8vEWlJLiFSVl0

ozcyJEb+1gWZajiBzitcbWVxXBMO67b1x53lXqcDeG
bhLSWw4d20qmGOl3E+FGtjf1//4MFBdSAG0D57s1GVaw7U1LLlWrnCOFGg9tjM2XM+pV4v9Rwm5njO9L

4n/hqgrNNZO2j8fSi//D9LBcFx6zWvYj+fh+EZBkz4H0MSL1uB6+uFMwgYbYviikO5s/jdaAczbIVU9b

Ui5/dhzLPmwWvJXUd263vZn8YL9ahtttU//rD5qgQ5
YAQR7zFb3J+0f1FKWw91ayV3kikvSHTJnVC/43I3thMvpKs2VCxhaDv6LFCpsGbVqtlMTNv9gnMgRrn6

GJz++wF79+9r1XIguBjdCMfeTG/f9xHAayGtY1VN8vnTX5HQitC0O0raCRqdROtvJgEqK5Z8Reuc6+km

YBKrfqnHKwVflIRiaX0Iwwg7bjRZ3i+x9r4JqsriKK
dQwCZTEJcry+vvhO9HkpgYkH0j5714RdU5ApXiGAqrKcXJqnHpiz0n4NjIgwFE6jADP9jGpjls5EGxgG

EKCDyQaipDIhqVGCY68b3hsmufk134Mgl21q+zdknlpSggnYyEHKwZ7A9n8RHnylKaOTiQAMXnRJKbBM

BcqMIEULCYj7XuqnBARm/dkN8PFvcMgub+enydHaCg
fMGV/oCwPmHntb4GRmMvF9XBFZkUskXRUh4V+TdsylTR4PoZ0yBQzvUOA3auU585+s3Whnjc2T9K6fqg

f9HCCrVWJclzjasjO/c5ibh2A+hAZskolUV17fZQyuplowcCNLR/dcSJvECmplA87ruTVZGrd9INx5FW

dINAlh1JemSujdzlhEpBdFhof0cSl/TCs5uKGuJP6b
6yeS0y/Vh52DmUlmznfzgD6TyJj+zjTBCVRPXodOec9T7RuQe5QLzdb7qa5qY/yyM+5o4TtRkc4lsp6b

MRECLnbDN5lq+1ZZtXSx/gFylus08FSbor6wy4Qay0CecjPQCer6eooeiMUuragcoUx8ohT68ywmCcBj

fXB51VZhghqCvtP2A1hdsReBnYOGMwraV25xlmtovN
FsOe4ZkwTEzOv3ea2f4YPWD7kMgHi4MCNAj65K15wBJxBolTFM2aoGyIZqkbTPO1+MHkzYrx2vjfWEE3

bLmnNWR8L8ec6kjU2y2ikM7yiNrj3qNqMdiM5iaTv8lxOHzDuU7ZFDCl4ajRThxPuT4cCy9jhuLaLjrK

49Cp6yMRMJ+r1h6cKXZp5bRb698nWrGdP+2C0zYrII
7dP3fUtK4htvA3TAWU10b0S6pt4+uvD/HH6wNu6uqKBCfCemtZW3UsxOfo6uSYaPdk8jfy698CRAKAZp

vQeN8JajMPC45rbWJ+qek8xUg4zi4JklGFfib1gWnV6uG/cqQ3kOiSx+T+0jf247MkMwBQI03aQ+MbXR

lCEj/gIVhXfAm5JywjgX1TSm1RVRyWSsfvkW/AJyU0
JlfX3kBek3MXfDBn6wKWuxjjMSIs5E98RUOw4nTXXFnx0z+ElbM0OkzfgGvYyfs+ScKk355WwiE27r4c

4wjOyNAHIncMFwwkWFM2nK90Y+lrdkCnj3gAUglSJro72LYK5I3KNZzjUh8wWDDp/WgpXVroY9fLX5RG

1rkXkQES8HBf4ymEWOrRydsmU+l//y6dOtJ1W6s1Wq
Z/xwkLVEoJolGuNwrSmnBD3meR1FG8iTHBX42jA3NUqeEEkgOB93wm78Cqh0bxUkA9pZkxWdLQg2/itN

ggVmb6xQadb6g66IC5kkHxDguRPWFTmU/qYIWp6EtM8rypyxMQidNkHtp4QH1TmqhXkWBZdCF3D+Rqih

d+J0aHC/+L4HGY4vMDZatwds5EV+XgPnmphprjOY/i
pR8PXCOu6FzyXC4sL1qt3i4nYL2qakZlX8DoxxLxFbBLPPHRtmWzlc4qGZ3A2DaW5j08JB2KIb+i0r6O

onL7NYQTIL9/QuA0wCy+HXmbRHVTRzvj5RbjM2crsv2sL1R3AuYBboMEujb5/MYZYBzYVNcgm5iKU78L

dVKhjUzh87Rg9FUDUz2s9xHCOyhBmsTAXFF+mgkGqN
57iWLmy6Dz2DFSw2GeS/QM56L8ifGZzV/XnMLjLy13/H23ujG5Tmjpk2jY0Jn4Gsqf5Xf9cPpVLGVeDx

DzQZP9pN0AEaMuBnosC/nSny/wkVbwqwCsWSebwIBu/UWmSyyCNvKh+UH+5yd+c4ch4CXndTBMM3Q75j

K3g3pmPnm16YevAi9Rk7saqRDmbltAP/TsBlr0VbKt
j2+3fSr6/AGOVdONJlGUWIY82M9OC7BWhfJrauISAvsZJt5+moG/kWAVxBkXzw53ZH3tW/da1D3hYoE4

tXdtnDURRiQmwevXRBE8xCFfBcduWr18+l6k6NoMlu2+ekCeaWvNrA185cB2dhzJz3HK3miQC2RowkmM

Myy+pNiYkq5wa6qV6tRZT7l0V5GzY8OYZ7ZnhOzPlI
AypX6P/M5/cDc4JALEr5Hmzn1BYyCOYl8iOMXd7pRNP5j7SoikVbDI9X/8oWmt6U1loiQhj8f+agdGJQ

7XvHXGiToTOvN4+rPS3jhLwGm7CMZ3pI+Ll4r0055WjPp/L3eFY+cs+adSeimV7uLdw7n6jC9Y6Zq44t

7oWvMj/H/xN6RgPfxM6PuxgWE89fet27cqMjTal7mB
scbFX6PntLNhQJJL83sIgewKujGnk4o3BtOokCeBe11pk7P2kT/wXbVA9K5AQiFRUwNOe8OzCVCXMZKg

I/EJL/gt65n56b+Lq1jJOssBhkvG16oYig9GnV/uMfO//He1IOBKqi0SobeD3H8+wtdGVSgxTrN86GXR

SZBUu4g8lq+q8WoIHmcPbLRIBf4O6qaGiX+PrLLqDM
LvVWSoDNZkVjnbVNnuF0sD3f2p3JnirY1s3+Qiz1nsMU26+HqTwRqtKsuHtoTdXdRB21r8krKRwojyZh

wWnD+95DMJN0RWPrk+crPRE0302R2kG/UHW9Qb2vFK/Lyx/Op3qJv19s2FO4XAlNJFdS8mhX+Xf+GH3P

z1op37JkgwJRUd/LOEuINvJgcJuaIwc3B4O+KZROQy
aCFNjgN/2NdNoda46hD5AZFbe2kvdH7Xs14Zvdnc5u5j/a4qWaoPiOftENiJCPie7Jts2euyleobmU8i

/srRdj5g3TfZ+Eff5Z0g2iws+NG/E+aNSKADB08UOmaEbkGxrmTwFyulE2vHoCLj57EFfKrwRJoJZ71J

TQBmZP1kozwOdkE/Z0D4HxqbxTJmdKjVuNcIw83a0j
x9zYZ608z3mT8m7ELKBAbF4KlIENjbmEJQhXyb3TTwtof92bchA1S7CoZXXtBhL5ecrdB33RIEJInoj6

iC66GASGf1XXz6j8+fK3kCENANL9Na6w/CLUg3tNyhVfX8AbspPDzyxgaZ1hUO+dDnli/8ley63/+d/1

CMgKXzYSGI3C9KLS3oZxCQQ5Iy2FLI85t22a+OUsZ1
fMFsWIloyw2YAJyZXqoOFs8XY/QQYD6P2NG2a5O8fc7wPqDjukB5HTpb/VuPxzPceLHVanZN6wSvjLDr

QFrzWWQ3xVkPcIuml6f2tnuYKGXreL1o14dMiRZ+GCo8v7TA/Lx65vzYR065ibOUUaYWV/yGdtIplTBK

l5wabZ6V4a7Fsx6DDxSHedLYJhZ45LOq/bNFgpRhJY
XljqMxcz8VjxCTGEH6+1AXnJ7XX9pNV2PftI65lpsfJFCi5bIBeAESc//tP/zAm4zeDi4cR+Xp45tJup

LU29ZdMXnXRT4r0xNX+YcOrx0A8+DnjCuN13qk8iXPBP9iLYaKVKLoU/xmPgNK4GklbQTfevJyufw9o1

SJcO1FTLYEW6zt0P9FQyMe4DfgskPXeCd1FGthgzpf
rTky+TE8YH9qwdsEcNC5UXf1A9WD87XGriEEjuT3ZsG7aaz9IhUwUjfkzfNibjyG5rYGrK+EvbhX10zi

bySVh3c5bLstNYmaND3ghHCitmySX/gpx7tNkDA8k9axZzkW9Qeh8mhpvUL/PZyEeby0Aeq01pFtRi0U

OJI1tSmgF0gb9mckXS83vKetGLk26DeKZOQNXn+Cny
29wGg5QBmMg4mFWFwltslkRLQQ4Fr9vycNK3uPqvLAfSGCYV2ieDkH6/Bc8hGSPTIO2hibJ30lqFPOYn

Wa2BR9hcRwxtEs6jSMOs2X/gMDslWGESzAwEIhw6yEWdP0MrmHbqFZ7X68VPSbTZjrLZj1NTTrJEy/C7

vgI5sNzSWNbSGPe2HlBM1oLbCfQwNH55N0fl69uNFq
rTDW6X57Fgfg5HnVmM1mCJ3Qsh1c05FLg0AygCjowbcnHMO2h5OBQkYIdLgWPjWYkc1hVPUF+e/8tJwA

PjqDPtpjI3oqtGaHW4Aq+wLEYMQhQqiQk3BiXB5weLXH7+jTFQRH5qyA/nz6V3XAm2fbQLX5ehtRUEgh

SjGJbaL54l7lcdQwZPL3PGOEuRLCvgqkYgw5/7SX73
hFtSMunNcpvIrQUlUU/3nlPzJdXuQvGQg2rlLUa5o6Lj/Fvte3U2Mrweng3/3s4G6PEBaFwffSvXJt8q

a4oQBMxaQUiBylJoWTGPgHqdV+F6MRLPjwdVjc4RNyyvwC+Tvw5Aw6ouxOYi/p5sDhXz+NfuZLXBZwqe

XJiY5PTf/4ZOTbM1MEf5AfpD9Bk/CZG1Vcstbzw3jb
tFTNtpV51gIuOm3vSQ6bN4/x5hIu63D27YEn+atVPqrCuKMpSqDNyHk52V301lq6MqvmpwW7za9JImbp

S1F88+TlxLMXbBwphZNmaqa9SnzjG1iaKvI09RTDz42YwrA7OOGuZYBpx8r3NcLhql1hJZLcqfRqyxIL

jIJg7Oz7va4dQRe1UJ2LuZ63+Patricia+sRLW/tMxOH2NY
obj2MhCFgObOx+P9EtIfsajSm7cFMN/K17kpFN0sdRTI1PH31eAwomQcCZ+vpQ6O3R89HdfOcue/HoGV

O60wVJ/8PVzcMS/C4wcZCbQR/RPWvRqmLBvlUbz7Fui8kow+HgE8j01d7dXTnlxo2vy6nGpUk/gCdBvY

IdLihGRjqgpzdCwbXzfGL1PZZ55b8eTUFBEFpCqznX
y3jsayReAnUgM5d3MSO66cEnN1pSMT/3mEVfx9ca7oyb1LCH4jZ/r7/CgF44Si0eRvnGs2rJcGyBGUGX

coPBgwzzeg3lND4GOvgDAzG2pVNpMNVg5tm8Rjl6CEC8jAq8pgYpseQ60A+RPIPiQFhgZrcAPoKRwMg7

9vdTLgkBF+l5g6ng18rKgE73P4jZg1rHQhV3IlOobU
XYYLxA+8zF8oXJP31esa7fMy00NmR3MDo3qsmEWIUqU3wKmioSCsTUrtPS8ti5JwenjR6O7rSriDDKB3

ZdYLafcImpInWZYjOB7PoX4XqDsMMgCSye1o24z0WbSybWhXppWh1Ra1HuvZVURnxGeO98D34Uf5NO8L

puu3qGKoErhlPCuaREJP7Ll6a6jlZ8So3M0Mtt15Lp
t/BaV23XK71BeF/rfRBYd3yS/mKZj7RiSYBecpKiPKN8d3r8G2JGWz8QJeSjFiKg5ItlpL3ZVw40Jijt

o3OnO9SufQ8M9YWUqVYizpN84jmaoO3WHAsaVl1pSQReEXH/5tj5Gf4f3WzoiGHe+dDgc+yY5f3r5/Bx

Q81ENR3xOdN3/mqBATADGcxLY8fOtJwOQULCfO8Fub
EQj+urlY6wrNio68xstGCKpfQFrnCDB+/i2Lk7F6Y4bUXypkDsfW655a0k4KuMDL6c06Il3IF3yd5gd+

3D+7myBD7zJm4qX81XnRHmID7K1uYkLykcJ9dsSFJ2s845ourLNX+kHHl0UURM53A0sgbIN4O2e0YO9R

XrU2c9254O+O6uRJedPOV186mnarUqzGcfacZ4kjG9
bBwKhnewyB/su2kmUCKu7smu+89edfv+CvGZGNWu00ttei6Vwfx4VkD0TGrFnnidYCEVW7vXCxu5osyb

o86x3RKFUQER2zC0ppkxCmzEULLMyUS9IDmRGQgrE8aKhpPS8Ne/CMzF1/tsZNsFoiLCYVLELsC8+TwT

csZ1C6LK0/EtF5CnisROEhvr/C+Lw2fsWhm/fV/WX4
Z4Chu1BmkSHBPVX7n5WI6BkdrtCPTaRVqDUPnoylb6OcJoS85hWi1+oSkuMw7BPYxe44arsMqgecEYFg

irKbC6b8mpWe+CDJsvdhBBf3IjWIXGf+el0HKHeOPYqEey9TjduBuK2juF8Vt/x4FmdK97k505Ksyvs+

54+re/3184o2yjJDVyjOG6eCsIbD8a993ZWaiIjIn4
CPly4M+AM//38YWmP9yfUc/cSOGzcShOsTvAEI/L0zlQh3P0xjbNErvadkoh329atz22WFLvxfa81Iiv

KAPxGYOtRdG+9DO4uJmjJWxgLvvNPJIcJ2HTsy28+dGcOykTGoxgQ5KjmviZPFAFHijf7oD+7+IU1o6k

mBpddpMLIXH8+L1NX4ZKaiGUcwNfbbzjri1u2MtdJs
dv/gSfm7W7rc5cwncnQ4PoMKOie3d7CarxlwAMV6BbDv3Uvxb+/01bTvyFS3PrdRxE2e3xxCVWhlye06

FeLwkTSEwa5hSF9Qx+KH+qDGTrFdiNtVMQ7lzot4vmUasINWuEQyo5S6ekKUN+rZjil1fcPCNo2CgGsB

Z1EJHknvwxqN++ahgEw8J+WWtm2FTqs2o3tl1qvKOU
cCuzKznNZ6TJ/5M36ZgmxqH7XEMkXxMOClfVvRUlYXdEHuc3dlXmi01W+/zBU5B6RwTvVRTvV3HuCXcj

KVhJ71VyZjc70kSNlcuwOGK4YgzNYRaipFhDmqK6Hm2F2ocYI+EQkhzyJDscVFrJlOaQj6SyO1b9bLmy

/dHH3q0oHikIsAKXDWg4ynsXYk9qt3f/UOjz+cx4qH
j8QxFvLzwK4mDkAFc4UnsJpj69Hlm2CRW1FoHytj98r0CWMdRrUtrhtencn6kXJJfjrUE0KI9JXhT0FZ

F1fAY2QUhm027VHS2B/fEUkATHWAej70DSw4rl8PZ9Zg36PsQCuj1UL/F1iALmQa5HVcj5gzdplVVGAe

GFeeWVIn2y4pezmN8BxWflocPRGUOKT/mwrouich3e
VF0NzPTwWWWmT+3pedbuyO1om8Z0k7sROqrvvUWNWK1t3vBYvdeVphn+aE6R+/J1WdMUO9mIKS7edwDB

jEgVy/W5ZEzmn/JIGIs4eLTta433CYsNHF8Pk3PBeyiehXCBA0z6g0iRrQFKJxdHs5RZ9t6Rx4QwsS6h

bd4fRCs70iZo3W+dbDh04EnN005DR1vrU4km37Vi33
UCqWBxsh5yhVguA7KjNm1QSjC12VR9YTiDZ9s5jsHbXpJOPkk6ZyWwWh9RODMSwKZGYKsyUpVoW9U2fR

0wwke0rD6aMOigbYFKSjWZS7i6OjV8bzFXWH7bNqZUxCLWtDv/ImA6JDKXw8be3sY7YPoMeR74qL8s7Z

jCIgougtHRE3D0wWyELQBl7ykb0H5E1Vhmp2lxmo1b
AAPWW37J5edWO099JemzXyrV+Ip1aXlxsW8jSdYgGo8J9r8i5xNVlPgev59Tr+0ZUG9lodqr2LULcIVT

NsTWKOr+xmCUYHyTb8wnV9p60XbpwNTeb5+8Xj5jRTkJMnB4fqZ4C6xIBGzt7Q14tDWEUEPvDFIQAAir

mf48+7oQaXBiXjN4flK0UvT6dPsbrQfuaT5BVpoFYv
61EeQpJtKAMAZgpcQqRaxwQuBtVGkFmwCIvbhz7FAKPuUSoShXqebB1cFuXAqAYiDSRRjnBD/bepGNTU

jjdcTJk+MGXsNlmdaNvNxW4m01z63Wn4ToAjub/7glRDMruLeR2YNgXBR/a7hSPag3ZwwVU3oMIy1se3

+4xsTcO+7A/GX7BM9sA40zJIVLbbnAPvoz8a8DlDD2
Sarah/lQ+P83fyQRmG+yuVa3ZJworm/kJ7UC4+2VnYa3xIBy8Z0+GCm2zyuXYf16MXD9h/hVbsy79nAuge

OEJBCdT7JhMx1Hg/ksH49yeF8bxzzlSIak2zfiEkfE+QmW5GXRLWINZVE9Qv8h/wsesQ3bMgqOL3sTw3

glBZzukF5hwyM6sw4Ae+3IcfIILHYVB6wcCKdawPFa
K1KrZwEjVVf+Ti3I6EZBB+qoHXIjAuxmOaLvs/q7QE7fgsnAHRkAQ6LSEn2L+dbSmk0AkxRMujT8faPz

2/vafb0PWPl4xeUtFX7Ezg79FgjjA4AI6KC+hwvnxHfNXT6EOO+f7aYFSfLK7llO/5JaTM1xclb0/RdB

h92apfQSQaQ2D76R1Vt6yCPDHjRqaH2xyRYzFFWNIV
y6ssWyKVq8g8o9LE4jFWqPxxi3rWqLyV0H1njNczBfgWTz9rIb3apTtqGXGXA/kP00ztdDGmX1PK1b80

4nOWDUJxuGxNx/yn94zJ74OpgGSsWarwd01buE859waQolfwWnfeS9r5kXS0CX93h2ifyGiDzYFYIikl

afqbjFwqAEShOMElvkMup4NkvIKNjYkiFG4EsGMngE
oyJcu8+paD2glUGw+uiVazfF3th2Wo+4Hyl8PoQKWUrbrnm/et2wDyZ78qrX5X7WC3NCi1IOCfimbyyC

cuXZbPxMtOr/fcrShfKucq456xQBwnymASIiedu/ZFXKfo3JPMCE1FbtQ5aUQ0qrKQByijRXrBfF61R3

dMCfZCe8PxJ/cV/FosLj2/RNIpZxLcGuIKvQpy4pUn
9QbCpiM84OuUDo90uUyLKlzyc0xJAagHfcNZ1nf9QgggxZflEDuwGPPfliZ7zsIlxMdK8wLo+EU0OXM6

FUjKnYLIWj6pCeNFHL8rLjhXgMlsRDkUQM2nkc74js3YWkwILBnCIOy7L2NrJbuGJs0fPbViBppzOJ1d

jZedlMBqEZCco8JWOELxbZBJcjC4z5yCoS0Ad36Ek2
7dcxmRpXPuQD+XuBM+vNPppZEmi7zBcnqWTr0DerjTipXt9X6TW71Ep9GJZJmLOTzzcokjROpGF0hcGN

kzu37790RacGX8xNXKd5mQn5s2ICzcgGjUY0xHlLoDDN1qRHCom21lF/eTZM3AVDH0K38fn5KFuQo04f

wvmqvxh4SFfvG93IzPE162fmiq7hHM2W/Qfs5klzC0
MFAPXPcmPEd6FW9BM9D3vbhRx4Z/PmmjmRfqL6n00UPOAkGuZNfIeC6AnC9PukjwvxndfiTjk970sfu0

jiZtmV33Zl3QeTyn0GZ0ePW9xnjIeKQyNh+AAIw9k/FmaPPhpa3RnVI2cX/s66zxFcKU6o97IDR4BD45

L1v09jTXkql0OJl9nJOqSpbwM/tIXfosf9kjIi8i8g
caPFf68mdw+LQ3is0MhfL+RLN/6n/+H9n/9dZzQLhWX4zCd1eOPvoSNKFjyx/ZtQyq377Bg8iXTP+gk+

yhXGyijb6ozzQbf5c2wSeTA8Miq/cs+mbTi4vLSGPBVaOJlNazKpdqm7egM8PT8nNipXVlKVl+7Nchxe

P6sr2X2+Htav6iMCtjPwqxTHK2IsTiZC/3NxqfUrs3
GL8KdAAEOwQhS3eStQc984dOoOwBgtjtw9/Q56G2Lun5i2J/3n8drQ7Gm/bxlcMIvYUo+3Afc+u9ZUEF

K0fp0uUWbR/CLeZBj8KMukxLY88x1jYlGJ5ERvZbp2siDzn2LS0862Okso4Wxl9AjWgW4+jp44j04rWe

GL2MbL3H7s7CDY8VxeFkIkrKQWMlE/fhJUEvWAx1wX
2gSbmKsHHaua4YW9fb9hwfKg9Xq/dyZzgRkAMojugiX3we0+AywSU3D0ArsgPhewCEEbBgfGeydq4DUp

89V+reShgVeeOf+nk3B8f1CwxuUFJUBcgdc3dNPfa4CMWd0Fh8uqpnZdnCfJ9TnFpKXEwf9AiAhYR87Q

4N8hDELTBbqE4Jx0BBtf701DnmIueYpfcpT3Os9vdi
CecAp4pdh2622QlPgCGg6PDLEomr0mVd67pVqID9KE3F6saLjMzr1yX9aYxxgO81F2eiQQSX270D4wVu

zYB7ACL10y9MgV5tyjythgCjSCAfTDdJePLG5/WqgMz83td5sQfyMzOc4wzkZe0CqpGo1g7xxsh7Ev4p

hEDdXTa8DxCFO6SYo+plpGSPH7lXvYlqDAbuutdrgM
b4bMr1ezQIfqyOSbze7w+xZK1eL6ahftRezmeMpfjb8fJoYkR/ogEUKM+DY3ioYrewRSDv5whx3zGKtV

Mc632bip5F5F4GUVkCsaaH0cmofYJlkKLz8G329HNoh6zMd+KbfsFOunOL4lLfj4ZuwJtqUK6Pk5kg6z

dwATG6O/99+tWM7JfCPBIaW/H52tBZ3og1LZc0j662
T4J36WXKJwY9liAEzK7ig1rxj0H/GFfP56Fbv6dLA45iVy6ee5Enef477Wqao/ihjCY0VbidTUqvUKOM

qsmyIONxmVUerKTae1zj6Vub2vg60+46x+hrD3WPHGJhk1T3NEiW0Y79s8k1mZUvnkiWnGRn1Y2popSy

jJA9LJXW+YFmApkXsKDl1FPraX9FwSmwcCWYB/3U0s
FQXLGqm2B6VyWdxBjh9q8n483u26/jxLi2O/wQ7kuFxpX6uQH2O1oE+SrTa/8Al7hBYVzS+S4gkrORSr

te8YPkkNb1ARAia75O4FbfpREAe4+duEsgk4uYZGTIPbXqiHrIxCrSX2th+dmgefNTXbteLGQkvsMGRP

aHjqMbFRJNPlViapqIf4JE1iqk1r+u/+y2lrYgGXr4
tlyT3PZUN56ngRGFhA+3w4ybEdqWDtZ7cyTx5cwC+uFilTbRnsHD+Me6doKKc5aq9swxJnHR4nRb+TRh

qi4+aWAyo2QUvE2WKfbQQzAYZkK2kjSd/NqQT47NlES9jmld6zW7gEmaMeOj3VSrzFz5p93Oen01dHEF

8EF98wi3Uc7rsctsQGw990gkPdTFA2gowO1frlXLCy
+v6K91k3xqwTrGS6MRkCAro0eWe/J1pi2xn8NrPa2v4S9iUJUdI361uIP7OIlBPeRQXvj/9gmqn95qw/

mg5ni/c+PGpXZUWvpR5MPdmonOtpRTYnwY9nieHTiNeV4cdoCnAn4a54g3Jc5Z/JXsvEVjW1U8qKPJZP

VBcm808TeDr4WS6umcqVGrLcplnpfqK6V2mMLXuS8k
NPkAQpQ2zhGOcbuchuJwgnyzpnAm1lM8x4q7g1asyB3o5qvhHa/2mZl7YWld3diU7ADGQUrzafcmcLta

z76TCaGueR8VMQ86FmWcMVDAKhTh4bygH0r5r6ck0TkCgoXcGu50wTuy++K7Tz+RI+oBPxGcR9/0p0PK

FYt+feUvKy+scIwB5uEPcmrT1e7Kv7LoiT1iZ2X7w7
FuWHhdXxqO9EAO8ZGSQCeKH94DwZ8oybSRKR4hBE0pae3gZ9DxHwZMxRttVwWoIjRmZ0zIC02vijG9Qu

0PJoOb7u593pnHItpS/HJfDdUW4pP8OjYuSsN51vHe/y1S+ZJ1fAg254XoR80ImuTyqfHj9Njc8EB7yd

Xh8eNahYwoqQsFyRHkzC8V9xqKNdWiv6qnP91FwRrr
vOBIVYboWOuz8SjhBmnwq/x7EEWueXxGHDA9MN764dxC50EZZudPN62O05xE/hCObH0KzOm4XOHDOPOE

25QHpOVlxcNP7u+MB2OdMGvEFi1Onf+O2kMjOB6HMjVElvW3hlNXZ+YXyYG2uGa7b28eop2+eDVqE7e7

6gdfdyinjXHkODexwTmzpxe25C3MhnANqUU/nUh4QC
aleksrTSc3In3tULkRZrtMoW6IHFxe5hLGwzZIcApFi8QWhdxWOiLTz1nldAMdLULCzdLAAuM0ERVeP4

eEUPxhwAFxtv0dN4r0yWIO7MKbP+wvmy2KJpVdVelrZVel8WZdK05daJST29+N3Qq5Zz7e7aoLrZiiaE

e6FlxE2nnD5DCr9sGmNrjKypRWnqELpwOEemZpZsE3
p8LphBG7+rifMHJL+Z3ySfXYLAQ/bUf1HT7MQwselWmpyyyxAp3n3bHlW7cofaWjRu38fYmdAUrcJgH2

JRKxMY95y9AZamt2ke/cXGfair9AHrAdr8zhawyP7HPynn96M90AoxpwKLbmf9jbtb1mZQ8XvVILja39

ExXqQ2JhfU7fd3JV/WZZh9OG/ANykLsX9fkDXwRA5v
OGmDj4rIDL2hljFMth8I4Heyrtp+oz1CQZKL6JXF1t7Qy6bDzfWrgGAiC9kJ3CUEti2yqYQ2jXdfDSzp

Dd1mcTngABq9HytTj3VHa9ASHCKVESp04u2nuVOShGS+jApDY3yLafx6VB1NSwN417x5sTSKjqVl9KM4

SR/wFCKIjjwmwA6stJoU+g65fw1h+BjLKQm5ghzTjI
PHW1lQTjs9XYU46K4xpAYGz6/CLlsOxUslUpra63fIGrOzZ+yU5ecKhwi1Prota8jvg3YKL87OvtzI2c

mNxjNyW6F1Bb1sYDKOsNxZC1qZDsp6yf6mcia+EciZLdthOOvis7ajbd6W3ItC2fumz7V4mfMJLfJNxO

cS0KoI8dX+Xw+3nw0RfhWkkrR1SEhytrHgDA84MxhO
+3qePfLB40XsXygra9ET9hmIjDECra/2zg/l6rd0Y0nKYuTDg0vObiXjrM+gbxTkOl3vynEZwoO/Lqig

eIax0ngaSfTrnsAbMcqG3DrV1ce3V5FtgjwjCSb8KkfDH+P5q6tWymQfaXy9eulUb4iWHElSdTvHJNAW

0oKFriAneZGYn8IAvDMO6L7OzyEmx0xSCmT4ruQtKg
EsDs0BG8itnjWXFdIeD0jTD8C7uILoIfOTJAowG7YjC6o9no0vFOAIsFYtmZBPTGBC/GKWh8iFLpU6D7

TuLpwRpcTkx5emHwWtdifHsEdjmohN2Yvs1gLQKBEWuZxre1Zv0POZCzeuSSLmc254Xp5iMGxE2iZ21P

yJ3Ehnm3rReVFM7jmkz50vxiuQxlx1K8EU9+F6jOCz
OBvvgAfmqLnqSPUYOpm/m79VcP7s5qtDc66eprgGD2lAZYMjV/cSDCk2UEdbru1qFa12c8ionsPyBhxj

07JxJGL1B1EQo+GKev0Fgh5tfOix5nX0JapzwCY9D26xd3GdgKbgcEqqXlxcchRVBwptcA7XdXKKHGrU

tvnlUY/2bfb2OFz1PJS4VJsD1TkbRvdVJwx0/1vOaB
939hr6ka4ijU7USy9XH5b9/bctBVf0blSYofKbHyyyKpNsxsUv7Thn3CuQNP9M1KG/TSgMhHu4GOQpz4

uf907W0wR+69lsZAGHqs2Jse+vodJWPiWWC5Mpd9hN23xBYqYaR67WgBccZWhjEb/OtPmMfSHdkhMQbt

x8UQDOBI/4vSik6nX+83+rXXhD2eba7oNSX++IlfiQ
5IYUHw8N2N6P7vsAmKUKU0zMafgrdM8SEQ9If/2npa4gvh3WwYHkrtWnFk8L3e8cOW5di/tsAoQkuFuw

TTUw6sLfyHk3QUWP50dK8vD9SsPuqsPgXkrShPelmeFeheKivMKp+PJ/O1nuyhiejganmjCV04HhtASi

7L3OWs/y7ciNMSxzGukQWDOczLTbhhd9CLamroT8U+
7tf+km7mQ9nrhJCh7w+mfvGGOtkkT5wPyPZ9fl/s+2HJr95qGhYvSgfNBH2JPY/HbbzgGN+HcZIW0IQL

lW/ZNhy8OhSG7XEKXVD/G+Y52ikiTpwCuhLErXZGzIZgQGpbNM+HLDLCVXlPIW1GZFuxD/5yzYN1aORl

H8BcbqJ2GJD/Y9tle7ZEYq+X5ZvLttIM0NrtvYLRhu
oz5C6CSHuui2XjOnlaadSLtc6DvP40dlasHlYUG0qlMteiaYnJwaVYDJLtR3/RFN9sDbHJVNw1gHMUPq

p/qgfDfC5EhNpn/PUx177Rypm2gdx/X/IWGbLVA0/SYyAOxk5VJXLmjXHbzA1Fx4uhr4yNEl6PXxxUi+

zt0SmnO2JFLRfOoOlQVwNfek40r6xpJj552AI9CC62
xL6S352kNr1lfKY4RTsTuUguAnX1ANJGNA87QyPPo2NAF0itYtfvUe4iyNLT7Z8QmSFAjEzM5y/4WTdO

RN9DrvSzoJZIt36VnFV90xaIWVuhWTv+LOQ7SFtlI4cf2o1V+nBK0+PSIi/wHbfJdkN3zDPmZDkhiL/0

XuYa93H1+dsYLnRzjKONQAlaNonmZ3xAZbuvFZ1Y68
5L+Xo7BxPxFJFT2VsikGRf/cgtz7/P2sa/ZvuYhd+JMDxyU1ob5KzCN+YwX6n28SojX81svTIWFnouI3

uBhMg5poNPfYuP0krWPT7mYIZ9xZcG0QQOmNOl5Ftbk1InGnPT4lX1N0xrdit+F0Xrttl8R073kjIZ3g

QNxpfJUjJ2eDmvhw60dayZ/t8yVCtlrZQ0dsroD2x+
xm8SxtKy6+3NKZMT0G/TzGRff0XucBLMdt8C3V5wrsFnDDahJjEp3nfKvFL3yPbDeFczlr6s3Hpa6ZlA

05L339/6OSGi0MMsGV2bFS57ud6ACOBVyJslvT7LhiWEqyEnQptlV5woTCAGKvr/GxcbOQNrIbKNXq62

titd4gIleSA2qfz9F/AnF/Ll4zrUE+0tB8NF3dkfZ9
FtGOcDnNFu4M3eo6YmaqSDKr2jAKgMikHWFNUZ2UEVyA4+9phN0AkSkE7dD19YHqEaJ82BhN54CPUvzP

iV5TNzXQCtap/ZVmKCgcNGWuqZ/dF2HiyqV/CEqayCP4WNJ1no2j+j30l3bakXnIMmJtGOVsrkFNWRJQ

FzXKq4rM2Z+POxlI/xQaqa0XzaIm4xHImhRJadYvA3
lzhEZ1zWOxuAMgCwrpPury90oovE7sSUT3R7CFBCEJtWx3qxrvPoxfCgZ/HKtF8189KMyunS5sQWdvJT

bqqzXKsUyOlrG72aJnYktf3mKq0W0f3sKi2Xaz6WWBNlFvT99zOTO6mjZu6HLcFjajNJMyd9YQfm9P85

gJOgyr40hQ6Z/bEStvR4WsNs/coDIsE7XnCr2/pDjz
xQhiJn5akU6wC5T9BN7yjVUqN3fLqxP+Vd/G78gbU+Ig2NJV/lvciuSuBoXN7NPd8LeL0f5nJyuZy3zC

/NonYECYsMvNiC5NNYrf8lJvxEg6YU+7yt8u+sJH1w6ogP8U0qqb2KESvUY4RyvRiANhrQJI+ISL+jcB

nvk2QJbrURk5Tf497zFkjcKgX53/t4evwCAIomWjfH
AE51Dxz64BqBGnh4TLBj4F0PMGH6gYolmK/1ypRo4ebQeeCMpSG1CjB/i06nbGplQ0H8LxIl8wzFmktz

nG3cbk12ddx2tF2D3jP28iwRR5rZXdcJ/PcA4asKyKubV+KrpXgvs2Xwkq8+wwG2s7iADNaHr/HTn2TP

6YKGKn7iXQXxBCiTrUM9vRtTD/j6Qs1/H//VWPW1zA
aVZZ2myf4NyntYadlbhqNFew40kCNyyhitmj5i2iMoWkgE7Q5pTIRw1yBIP/x6C7p9REfzW+kBwhel3b

+1twmIdr+lo7F9FalUxSB6gj3aOUJItF/v+lyYu38N3eqo9WRh1nL2Anjw5u7Lzu8An4fgcx/8oQWP4r

p20V4xG9Zj9fpc2W+DFEaBtfzbYTlXyp2WXsU9hKZH
sdCAsz7FG1V9Z8ZfEBKBnJ8H5bmRq2j6M9W7XtCXk2VP+zFTb+TMNSsPj9xlWZlM//f2br/h+HBF5sXE

PkUkpqEvrLpt8CxiCeGOXu85i8spWUt5tvtKjLyi1gG+wdlz4ID0kJvz+KvxT8Sb5kNG9+LQ8GM2kMDp

VXm96VlTJiCZnkfS64BBl38iUHhf0uC1GkphgrrkDG
f4ECS55bfDyVQtZ/PGGogpQcxMp8qdJIxWpOdSxMVdaFnGgNNxpnic6IcP0FnSNc/NYCmWug1jxfsYgf

3Uy8s4veMYjs1iC4xv15bQ64kgOvJLh4qFSC69SYnqOEX6urrPw75YRhYkDFGJGQlxWSD8UPvnUUiNlQ

4/8tVf+zFK/j6eY1aElBtChgonlyXo0hfUPZqE7C0W
tuHgBfPvD/myXd2Wx3MFceMzWTd6kGmZeJ0GSVLFJRsnu9alkaAtry5Me/434EUe2efCpBlQdeocFtFP

qwOQMrdzhuzSxO+ngrfpMub+dYwpb1zqk7qUwzbzZrU3ThNzwDk+eYCHFT1mzRT/hepA129vLUl4tnGj

zLkZSBbKYlxC/Ff+wfG0kdboGdqyxPBIoJfOKcQXt0
CfjThBwMDqUu7U0WO9MR/e9o2vDOxM4LtX36Vh5sEXleCLuAg+EJNG/8DzBeuX35/5xrTYO42d6rGLcv

yA5jCVq8hbPkjJQr+b3B133qaZpUZLmhpLrYhmgXsjsC63YKznI9OoswO41xJANXinheikCjvgIMrxJ2

2vH+RkEeABfmS/NK8L88rr8BK7tV4xUFoah0H+s2/2
wPm0nn7/JhiBmprkxMePicrLtcJJaNVDK9MYmADig0rdZrN00ipq36KdYpukWwwcre+VMq7nrcKguuKm

b2NNQdYZ/xt0P7DkxCkKfQnY6lpZqQaj8MNBFgJbAoaMi4NzyQpMzlu+OI+i6uVqFuK7mAkwOM6fz/si

FivJ/y+ygb+2OSwCssGTjBYCVnx8CUpUSde1BQvdMM
TnnlY8WLTGVoJvPc5/Tl9+r3m7VOH6x5Ng4qGnLUGMHnr1UdafDA8zn/jM5TTaTOPS3G1m86zFaAZ0Cm

r1i/OBZ9QQuunBpcxmELpAZOWi3Z9Y383GbUU6r3iC8WOHkRG39+16Dp50Uni1KJLpsBW2ozAv1IAuxL

mpnwdodO3MlDDi+6FXLEYDJJKDLmMdvNpJ1KuicF38
GAr18yGGnSpTTOrJbcHi96Eq2EjbWzsq1M46YaI89Zo6l0aeMEp2M17uXLLD4gk9IXTWKwd1s8S4+Nely

Z62bZOLyLihZigCXN1R0Zjn819FsBzO8idy0wl3OlCHF00l9P2vuGEZazdRoVmrlMP0jXnaXWZfBQYme

faXId/+S4Zsc2JIRmG96lH363aHMTCZR68q/lYO5Dr
UK5RP68v51mUgkPiujOfCw4viM/CB/MtpSqvNl2BLUMsxQdoN2bi+MtGsjcJJyxyUerLJEGl9AaRCYJx

VZBxI1DUvUbdrTIgC/b41mAJCSPeb/L+KzF9/4sHYP7/KaD1RrApmYArBxRAgH3+FBkXyZNYU2Ngilm/

HGI4qyg7KPDS9HqL/Ymxo8FHbeCunDI/37qSzq+D3Z
CUBeOv/fw/H5IeUFQ8D8/zRZdz9MnqByP/HMMjuxXe1shmAWxa0UrnTRlY8OpAMH7Mi2Uv/KZ3RvzrkB

KaxPFDU2dCeux2fqbXdSDnWT5FkDxaOKv2givnDC2mikHsDR1INWqlUYFw1whht0V6AREx1hyveFRRoI

pg2oGnzeSeVPGyTdzCw8/h6tkkm9kXJsmZfGoqpJjk
rd66ELEvqfpvgh9KJ+IqnRTk7K2zx/Le7iZLXTAtb88wNVkV/NWzIZRqvCuY/nwVeHd7sWHrCA0E80ef

AglvcY2LDU3XtJXqJvucgrqri6c+D380RhEqQg4yxBJlNZEGdjoZAAZCPBNkGYftD5a4OkY41YkTt7ig

8TxfDAF0/hQXje7Yk96j25iBem+MDq4VuquYa+VTVP
3i/5fTXloq0FXeZfx+udj0PoLy6GZXw7NTXi4q2yIio6cZE56zE6PWzWuIDF7Ont83qW8p3icyjOntDv

Jx1a3hH1ffcByXSh6wVP3RYxb/iu8EiVsZDFsBY4dnHgnFtLSVcXErXI+NVIVVKWZYKNFdH6NSpPkaye

+E2DblcA1nn5Fp5lBAPoljGLP7OazfXcF2Z8XSF9GM
ngYuZ2eDf972DytWJRqfLGTMjHnIMsYfLEjytAERsHRhbHP74wB6EKyJOwQe2lPZvWoWQwB8PZLT5Lvl

DfMLYOfGyyHKaLg2dTfXs6vKWK/sgGlXkWI90mYA5mhRdOSvmHlamX7J/CkD8RcBIHxnpwpBOdP0xm+c

1auqk5y1Oude/xB5Ad1gi7e3tyX2bP3Wl6ItCn9WHE
KdA7PlSU98S7oDDmHmrUlLBLLVbdUQJuoHBlBph3zk72B0Pg16hCzYWNgfEMDiuiXniNWopqoRgaCT46

0yVJrpXdZs6aetEI0SJiXVNc9RibQtk9DYkUsTU8Br2QEdPhYNcFCZdxDAvJCk9NFN2O4t6zo/u+WUqw

L2hXk8dx0EEOEOXjWpyFX7dK44APYoIGAU4aBV0tiY
FIzBMw7ToWnWjVEDnAFhVFysmEfVm9JGJ6TkAJRRjw8K6dLKOF02pTfeEtPierDni/WjFNuDhLFPIL41

tnRbF7oj2aNMjx/Cn6zyg9rYejfqn09x3a4PveSgsdcDK7Lwofl2K7RKqCn7jaPE0rgpg29QXG5ZUZF4

XHldGsiQakX+s5btvTqH/B1GL2I/SPRfciNMX+SEqb
KcL3BU4vCZdB1AzI/D+yGw47TqRdFMKr0OpZx3ubahzn1WvtU+zzQJU4PMHR0IVumObdkJo0DwsRLT1h

BON7XCnsWFQ4o1Solj+7nfXV1an7qmoj70pBbL6C9IByo/xW/Kjne5pv7jcNpUSobOYs7QoYOWAbknEp

guJd//iPYdV/oAyhPnIajQIN6B+7GksB8039n9djYh
gQ6f+hQgtB05K/lmn1DmeFJ6MJd/YEU5WRhTcyuUpCxXO+SG836MqXX2YpI1vpaMxFlQj2x2eJ12kBgy

MBqvfF1rzXdCKqa1k6bSoPd2QXTfyZp6gcvOYNysUlBPR1fvmEUgD1NeDQQCtNNprqQauZkob3dAkhvc

uAkk0CM3VtdwZ1Dd7yVb1VuD3gc2uI0Q0InRPARuDH
/jU/4Zt65OnPDES7k1UZ1wcmOA+CWKWT3NTTy2DB3mJvZfy9cODD6HP0DJHpUp2abOb+D7ns1go6x2GH

qqk/xboYMG5h8jrb3KDJNAKCAjt84L+9FrWIFo8C/6lB3usz+ooAOUCKcDj2ODnG0HxqRVGi+2aVHjgz

9bGlzhW3Iq+frRCEiz9AZqhquffrpPx2dZq3TkflzB
RRaMCi4U4paHRdCCLHVcPZQS1x02gWIrZs6kZOGsvw5f7eF3yvuW28vUR6/P+QqSU59RccPoId7ixfIS

u9pl17/8w66YGNJXVlQ9U+3YZh4CMOo34G7CH+rBMIpmWXVMMhaW2jKDgTRXg2HpB2O65+t2abRcN0f/

3d3OqwRtYWCOpvXAQ7I+1Wygav/dF5rp2VwLl+vN81
L1fVkQNCqX+Wm/DOjx0aU2cWfxpoNZk6LwSc+lwoKd28AX6+4cJoOG+yXH7BazGmHuKXSBw/anzle7Bf

5d2jfPQnFvT/+3ChF03Z+hJ2YJck/5aqAGYSSKf7iqCRZYuMFaGpEdF8esaR5kcXKQ6cpSXYh4r/yUEy

h9X7HiNE3YryhwZR3mj2ALcb8WemuksfPUT53DWeLs
Ew4ExlEpuJ69YRuiRN2pQ8V0lnLi1osygTuppWUr0F6970DnmTeB7IPUu3EGk63WxZWT56paDexaWBfZ

dM8N5sqLalapB4P+3KximlrJ/b3RCHsQzKKyZ9qDL92/aLvLOhwWGnQPeKk08wkOSRNREbk+FFgHviaZ

6PcPAAReXE/RzXx4ZYGA0jC7pQHo4Smd9UKi1L/7cf
QGYsJaWN1WjLdNMtvxGuE3697+nD9JD9fk4FPbClyJyAKcA39PmHzggpEIFPaujtC7doSs5PVzaffu4d

HcvX/bsjhFyFXBIYFLzH8rtSbirzaZ2QjIZrKW2LGVmiDhfze1AJoT3mSjhxqhQE7KWCe7AQhZ6ATPaK

opsd/evWxe0duzDXLWYI99leJ9kEwMqNtB3iK4cJbl
YPpDVq2v55Afl+YdqIqVPN3E/CEsmUnrmtTR049TzUFW3x65lHtMaFtmfBU3G8ZnOH2nL8BXESz9qzC0

BivSgMJit6qcodXtp5lE1dqWKN0RVfHylvto4wWcVo3KQdzE4m7vokfwx2AV7CnokR9d8CG1gFIJNKom

vKrn1DQkAnXmmIAon/B7VPk1Pi4USTrcLOTfrInVYI
+RcIqjiV4Ubr6Z0efzVhZX6OlsVksME/VOoYH0oyJWA15W6rOCe/tvM/CPa44knJpnuSZivXztqWghz8

cyGiG6JGIgMHwx2+6JqJPc3qqUmMawSGFnjVL6g/9quT2W5CGcvmRjQDKmgdm7Pc5V56Ws7PTkPWb9Uj

RzkrXGvCooqEuhWon7LuQ6advL224lU/Zc9XPMFfH5
5i0QvcrFh4A1LrHLwF0PV48Iua3UIJBTbwcNchZVKsPGyztz/cfnOpMSy3EU1jlzycOm1n/bO8115mM2

g5E67dsY20zyW9w/iEUs5OgMYR1aRtIBRxpN1eWIsPU+Gp6FRuLNdyhD8W2FJsVQXknJEnKLsMkFxkcO

GPSZruh4PCYlBRuI6Y6fa7SLvF4B1zrayxR5ttxesw
3zFl+SsZL33za3FQK6sy1JqKaXnhTB7Q7OYBdaOkRvYKNHGFfLaODu+wob2oV4aAz4gnPQKYCPT7a+32

VVnBemo0grfIz1yzcOhn3mqMCKwehxtdVhNBjUaEsbhveGYz22RN31qCaFC5KN71tJLaArgaNPOp55jK

q7DbnfvFZJzmxs8JQ6VLhj5i+rNzloBXbdLFJm3ll8
IhYJe57+YoMn+lNklseJL97BdopRcsoBPVJilgIc57hyW48c4hWEo7Uw9ealAtsAlxy5dFXwRipShfny

J1yXhEVA6oalmLkrRh+wD5QU1zgBlJIpBdbmbRwhXeQsw7j0jHVtF/mO5juVLfW5XqDvyk0DOdXD0Ya5

gw3IRYbiXJemAvXefl/R+MPlxMGaXmLc9HAY4USgKp
u2oxZRmCoEo4qocJc3t82E1pZ3Sa4x+wdsGavCyg4sp/szGginpyk22/0iSUfsWcxqfMI/n6IMiYVk8Y

BuvMSFjCXcseersJF5cZthEqBHoNJF0j/RrI1eDuQKPnOLKCQw9ngkiXHeQHyaPTnK0j12M3r3IgXx7E

5QLMR9gl3fCz9OzxOicHusjMUjpGItzFJ2e3pe5pK9
ra1hoEV3rzDTFPKjIcWj0mQNzvQW4s9VwmfNnXx3Ieof0N/qSbAWv1+lw1iSTuhb3dColKhL9NUXcY+V

IPGHJY3kSKuq5WyYjtp4Z5/SQDGjzMjHi/nhmTc1oV+s1VTB6RlfNxoKaKEWkdrinxuetemr3j1nDmI+

9gY5HkVV0+HPyUIkWTlmw+UHovv3x++rM5BrLPca8K
iQw4qwJ75zX2GdEKFRrmhQxdRzQ3RlJqvOnxOpXUDa3LJqRHeOaa91430Vi/53GAUADkva3HCjzJoEXV

CEaVz+UQ3q2D88cQfVURjIZp9vBcjZUaeNYkaZmcn7KnLA18dj3W9j2+Tu7VFECK3dEj6sFWrcplPCQV

YJ2D0Y93XUIGRyCrU565d1kK3hnfy+gUP2fiR9lpUe
Pgig9uWYusvi/DgyOsGIh/uC7jN90/jiWscmo9IuO264kBe69oY/7rK/T7X5tVWDickMibtLhvgZSJ1C

b7fkCNA6qQ6x8dQQ/4G2++VfvmHTulk+9wX+9py1G7V0YXai3mcs4UDZ1O2uEHboANw/jdoZlEeGp9uw

YZvh+AKtpAGz00kotc42wiu8EfLHvjA31dgEsEBCTU
nL8GPE2E2dEzH5lsTgL1/cSL4i2ecQ830BgNjlPGCqRTsMetogmSgvoL5/MvlBRrzS+lmhELwc4nn7bq

4KYnVW6MlbCr4gK2oNfwvxN2ujjIWYjHNko4mM06GU6SoofZk1IJQRDydS40iHPPyghpcjN1W6QMqOo5

iiID3wHqTkXS5bDTZbzoWQG3Tb/t81przVnLGQVTLZ
a6Micq4r+dXUupfAvGw/Ox67AoJkk2j4G9CNp+N636HGof6/cppljKdL39P0suEsjtSbypcNXApzr4iD

/ORKH3oy1nXM/L3hkggc/kpGTnNf4SOfo8tu9kz9DaiHvslyu7N6x+1LHnawXk+L7qt62QFN4wOfURWH

pFg5+qqLatnbHuhZD7dafiESmvhzlhh4zUWZjPbu8b
wIQSuyQgvde7z/v96GLe8ZmVs3BWte7W1j9xHnRtvRTW5l29abPd8hqlEBwoA78q9LiNeUwCJgLcafAn

reRG+zLytVO72oxkbtuttXRMJ0ftbWl6a4C1RbtYflYpm9MPk/bGiGpNfsOT/D+J9PmVyg8xln9uvbao

NVbSy7mtNoGpO46FNDN/FpkwE5NSZTX9BD2sfYKbXJ
lkgstoxASxRItinWSMLDZ2X1376wiGEOWL7ZVkOgw+37iJKrrUuMg+yweQp48ISpB6LkEgFVbdp5Jh4T

3UDwau3b272+buYst0KUlLOX19CA+RfgkQ7XibX/QKTOND4XcuR4KmJiE0YNHnGaxUm48rzyN5VLml3Z

FWj6vJek/KubZUnGbIvBUXkOrOsMH/Quo13Wc/aytc
87KMxmufWt4WnixdtuOvSkm2zkc78CkjLw/avqeIKez2Rp2Mac8Qvdo0ypJjZXrDzv0Og9q30kHY/tWx

yp6MGRMtGFcJY/qyKZ1Pkqndt/fux37cNrc3ijLyVbLHz5dG+0MG7x+LNwoz8bAUnM+OKERoKVVXSrW2

qg86q5O9xAd8ATiDEks6OctzCOLJwHjJjsai09gML7
yKfANFeq2FC1HYOsZd3J6UR5vyqb0/uSHjDkKWHtod7o4iB556hlc0SneZY3iVfGKN5gM+ogV+dnAirv

irwXxRZ1kKgrVP+556h5WAu7w73CrmGz9uBt6kbiRt94c+NV25KJrJtA93DsYb3woWHUXdswWn6XJj6b

89rg8ZFpJeKLe1hbjU/40B18kPgoiCIRAGJF+Iqslg
dvzxKZFbzJiZfl06Mz5r2MARNxvTAINY5gmOFP3NIppcl+dNhVAHG8QzNa1g2BnXR7eAz++PzjfxK6Oa

b4jFMf1Y6tAfrdNKWkRFzcik90A6mPPo1/PNgAz09uWT+9dn/H0RNQ0wUFNru3WZbDx/ag/eNqSxHFBY

Uof5UuevWIM2jyH6Im4a1XcyEgjNrMwmZeGrIvgaYK
uz4fi7ibeo3C7IBjvn8Gz7QmICnL3IR8bb3KvTlOpGlj0tW9TJDam+q1gAP3yTChXxbhH1ixILP2eMzT

7p1IY3eWAScgEmvirAZJ+LQFczqQQyst9FDk3VQ11F6/kBkgBrNSlc4bm1qZfj4LIe1/FuwNcHzuOcym

Brady/kJrLBy80RrW3DfBBToUNu+heb8xNaWFFtsrNev
Zn1s/wAhKzHeT0YeDQEps4ku/YliQKAjQkBp3vAPHIg+OewNZMoBJHpovtqcky+hsdeiYh8FuAFkIkIh

57R0lTjn2inA/wZUpl2klvhUkVawiqosh0mWHuipBn7jLS/MWJim1wxygFER9hpa8sD9Fqs9xuEBYJG2

4Mp3NhbARx+gZnFzqNvnkmUC+Bwiieejj2Tbr8MhR3
41IM1qD9OQoFFSrSQRfUrUM/O8FueXubuG52mDY7GZ5fvsibtkKf51XTD6/GHc65D1s4HlW7IN4Rml6l

xoGxMR/rNhh2XI6mYmnznstLpJSShs0rWKFApxqrrz2AAQhOreHMukZnc35Wgw6M5a4u1Eg2CslT1EUM

Qq12iMgNPqyvnd9bHKNttT4pCYmEUISgjA3+VpFusS
bYQN3up9YQHv5W/2w2X+Moore+Lx45nk7NDW+FDWfSKEe8cRaAWtGMcFy/heSBtdXWozSjR1HM2BTKzKa17

FzUKckP7Z+hssZcLuzOSbdhlL+1WAfBPz0KuVtf2r9/gvdYkJsrapOxSbx1zwyTBLE/xaQPqVsxKBeYy

WOkMupPJQKiXKGdWVNYGaVCAKaC/ANjE7WQPqG8Uhv
oIlisF/lK8Ami4U2vQxo6iHY6Exxs2vubGOG8HWrg08AoYZLviWjr2tkOQPL7uLcP9kypYkgeRd5iaDI

ifmPDUyUy3PlHKAF5OT4gMhnDGqA7ePx5BW67/fGrxvhs1G+rDB3/ExVcIfdEHYDdtBdykvMCjzi2Ikx

LRvWk0aZGCwCErD9lfGrkdCrrdEuSv4J3hqpl3dedX
Xri+dYp+LPWa2/QomBUWPHOorOO0tdKwR6bLCBG7+q/VhcBB13XrNw5++4usXeNwKmN762Bhn0xs43Cz

tNO8eI7HEIJtNXSOhMey6QZYGLobN+1JIuRuq5kP7EsJhCO9JKJ1TkzcjO3tkHMyzA+PeJYh1LJVc4zM

2lzraQQ256hOpFyjz5+vKFTqr4r1CCN4eA62DV+vMR
F5pt3x0jwf14o8W8RCbFOXPzXpJYkQ7CAdbInMNfCxrOv3vv7U5r4B2nmRsabWiWu4LnpQfHw5VOZxve

dit3sJ+EX4vy5DEShQK9PGmIU45J30AL8Li4I0F+zIlF7WC2ehFuf1/rsfzLk2bKoy45PUs7GK8xJ6Ej

BtbGFu56U1siT+eUmE5+P7naVro5TKhN5OETciA5QU
w7QxA0tKjo6a/7/Cofit89WyB/cf3vN1PQFPpr9y7M+MwSIsdWljiJQJucZKXLfcsLsyyyV8mYC9HI1j

sj+lVIHfvnK0Ym/KoNe3DK+cdrHlJBIycs0a0kQpU+VR5PUvToMLMQo+aZGX5LKwkVoiJVFhkXaud+N2

giF9WyRUSgGmvb6jKUWbE7oIGA11Meyz2HLpyVcCzy
VfHhabO76hgbbwKjOjalgmdCzzgWcETUf16ZuNYrtaB9PbPMJjuxmALaIattPrXgCVqeHCQW8WuF/UPx

c3X0MhR/Wxlz0CLACzYoSRktFD1BS7ecMDmMGDSvEaTtRM4gQLlgatwfeTGH2YwwVRwWGRfpxGv01qcZ

Js//lI05+XbxfrlkYkiT0kxv9LE8STlHXc1VI75z2f
oAVRg7oRAw4xijY8QG0beiHlsbWKc0g4OpxyCmFl1xcIa3b8rwMg8Pa0b3JdkAbZz2T5PirOYZT8spT0

aN9D58gSfHJGhXWoHC3SnNpHlYZw0vRCBdfl0lrG8ABwyWV4i6k8kbcPEWo8OCxfps80kWEUg0ODFiE8

IcM6QPCG/flGI5INQPT6L29zUpNZb1tFGUSreCtn0D
Pj4fnkphICSM2A+IAziv+/aWc7VZ47rFXC0Hf9yCdA3LBDXKHjFTTEX6BOAChqkQzWq/6RkHnsaXla3d

7jYjgdBgTT1q3iJEFeyVQLnsiunx6iUXfn2F1vVI7hDTcfSzFsKmdt0bREXXM4yzoE8MEL5JEawXNws2

4+1abC+P2JDCiWME+MqKwSezRNGSzGsOK3937nl09y
X2rhvR+oAUhIgzKnnCCzaawrm6bmuPb0VteAKneWC/9B1bVA48SXWq+s/cYq95qfjpJF0CLG8kqv9h0v

iFPe57NdWyhEZNgFd3C4VuBTYmMGr+lN5i0zJwUrIaUPmaIHbeaaVTEcz81uiIDsu6UsenNWufUJLv/+

56K/fqO+uPq51cMzlRWsen4N1N3kdcaqynwYkRgPkQ
+nbvL+LIFM4DDzQcoXuybGFp9lN/ftad7aXizXC0pg73qJmR8eL2kphkOWLw5pIWlVEbIqvwfJdWi8+6

mzPz4GXhue9hjIsHsi3VvFtTgTf7jpq+crLNAckxADNazAwg+ys4c8IUUQfna+olIwqZiyi8nznfH4E1

c8lL/gGTfxUZaeP3rmB8I+jpDE4vWrO8JOCWMnr2Dy
PiH/Xeejjhjv182Q+6fxh5HvC+U2iKfDkEfHXSdGRYx75akbRAs3Uc6JtSEGg1JkDGCa/mldtL4BSiSH

z7fmkbg+T3q0AqtFujYsuqGNF8VuIuScr2W1SLpi7fAsDP5e0tIbwlr2xu9gmHVzyBEyg6w1fW1ao5Pk

TeuNnRWg9lxL7cylnnjo1d9DnNR2xG1x1rruEJH7pQ
6/+g3Vt8MFuQ2LiC04fia836qmTb0xPhym2SWeMlobbdZYkSPEAtM2EAFpRsTUCLiREjJ/2xjqCg5vi0

b5jQ5ETIX3dkz25Uu5hzd+iPen0pqMkCaPd7P426wJP2qr34aGn/QUvvR7s/AISJhNPW16LfCRFMkfTM

jkkNLW0RLCRKM2LP1YptoR3OUGF/tfCYkgIZx9v+f9
bLG+9Ys4Gz70dODOMjSL+jG080nVHk87H+hxF4l5DjQeM2P9bhQRBeA95HoqDGXfU0EOmPb/v0d2dLNC

FwBji7I2oY3JddQTb6juvm9qVK+BD3pHTNV7+IQktHXL8KvObxIg7SaMdpmwYyVpA5ofb7qknyLh0IeY

WANsiWODcwn5+vxvQujZiDyse6Np5qfHHUSNEF8/Mf
3UEMYIs9EORd21NdWKN14YOO4eTE3nF+4EQ6vOw3rTX3f1f452+bFOEkH+cqAVWLryUnzcLlnU60u42L

JuljPlnezUhNfB/Jenny/g5tcHZ+IwcBIv3nMOGVrL5xYNV2uSQncZX/odzgaPqAx/Us6Xsa4KRX1/hi0T

z9JDtH0PMh9OcJO2TjcZeodyro/8txG0poapAZtn0x
GJ+16CbZ5A3dRkl/WxVKD7LCTXEuqBdkIk/huYptDbtikgw4zVpsYbin8+kJLS1B47U+0Eka1MtidCPi

N7jPzT8Y+/ELoUpWSdNGWrACGke47fG46yH10fHR4xdU7f5nx4e/7Bb7Gdycj+wlfLdU4eMWx0Bzmyq4

/nZKX5cIUGrnl6bA3CmqS03jxF+eoDgbm9ZtS1ORSy
faYnG071/WjqJw7fkF8DlIGAWWVrSpt8GAppKouBSynzB2gEP/qdP2ZwVEIHnQtrIylelEWutvWUjXW7

MSEKM9RXg6hN257RT2vBHHvHcdkTcogSGNST3qw6LwY+nocIMRgMrAswCnXPutf4/sHnJyshlU3zMAwd

903TQOkW7MhmptRx4G3iAT1iMkAUivW+Bp9d7yrCzm
0u1p3jQJpIypJIYaUiajF0exBfDiyPkyhFfun+4z4/XmUgkqjpaxU74Mq8OxgCILHJXQfwk8ucZO64AH

Qrer+9esbriIyQlm4uIJS0f+jw9eKyZhIz7Zl/Xe9p4aTaxd6744HuJ6LMWcHwcThnD2motGHmAyUyyB

0+clg41RrxZCYi0pRc3mg302XGx2r9lIDJjU3JBmNU
sGKe//NJA6ZdntiI2zPYt0N1ziSyK7YyS9sjgD+u3TCBQgbS4GqJGIhuwNrlwppx1/Muc0ee71vCVwgI

dJjfQhJArl6BqAyUUIeF6N/q2SHR+pFj3/ILLTAXDQUZXJ03k0HqBSDZQBJv7hEZX9qpGxhhg4sORazm

phToxIeOylNmKVMUe7H2fpfKuWD8kl8P6tcFQ40iw+
/fL2iQbK3InX8Mdy6Yb8a74WpyvA4DleK6x/eKFPfjjUCyuMlOqab3r4N1it3ObSOdRYBJ4RxH8vbcI2

VhUxHgQgbpZTVNWhy49XhVk853cDB7im/rt/YBvLvrx5wKl/FbEDms6eVFo6Ti0GPBqbjtmDKXGk+DZa

dDtnyrksZ8I4HB7Oyz8Nj/sYK/CWf8WCR/DgDDe9b7
lvst7HL9niC7BRRkNblW0WOL1bWmsEa70aWTrsyNFiQttnRRnUH4L52fy2uAFaaYDIFHSVUsw5eMTYYD

fwBQoV/WbbAPC7cN5tGQVJw5g5IlsF+t6NlAws4mfkVqRRPQGZp8Bt7iLJDrv5VlF4D1T4DguKcMjwwM

MMZNMCMWKxK1nz38jEyG5P2Yjyi4+T5AHwd7ddovQ9
fBeBeFyUtyQIU8DgGL3Btu1flbsiTxNmPu2WAu10nHeBHxyRD+ZuNcCZpx17A7mAt1sxiAoDG0/AEQY2

RkUwtVHLi6i0AMORroMlHeMfb0VNRRV2KxUARIgp35hV7VD7sKlMQNoQCx5XSamxCB1BVS1+Xxi7c0Hp

7/HHAUbL+xGJ/EUzsfCFipwGNEaw8g5npC0FbyYenU
vtHP+KUXjtcN/BxFZrvJb11qNTgsWNKC6HQjjw+8SJXUQOI0Z2A8vFVoi3+LWXsL6XTpow/qmNyHg46y

BTDAcbaHJ59HBXu49SBBdhkQ14CvqoEM4/Yl5NaQuTX0/Ke6KztzT+K9QlpkyYHWrASi6C31bD/Yr8MF

JODwzKJRzWvypxtJ2U/8sFer+ZrtC7HKnVnhfhK9IV
tu4fvovir2G+De36rQw3oPQ4GIEaEiqbbK48OoT2neWGLqlxMnQw7/UpU93L8pkI8gAJ3qB/J84PzlZN

26Rij5fkUtBEH+SXYh48EwUYORgZLJhh+5q9g/Sp2jUVBTDyAG5/1tjU0W3MgpMHr5AGuEIhLWA1b1R8

2c31eU2M+m0wZVMbHLFxztRxGzBxQSHeKBjK3EPQe6
gVPRBSWK+R7EqLQw07VySbsYbLAKpMla0CSwjYvepkGGHBEanI4ZTpSHgmRAXf2xZsLyL43nVYdDA3dM

zQbokJsypVheVj8SSWPs5a5jvXuQ7XKG/j+Di2hgUDd9y83lzb9PZ9Z+csXw0kMWPfDjqt8X6xQEQHal

ZM8BBijnSZfP4/y4q7vUJapPwbptqlsr2/hLkdR9aA
35Y6vPReyxAPqNHCOHK4OX7c4ifAj5rcp1PTIlfFY/5OeZyulgL2zH+/1Kr/oVFV5895nRNvMbKwRV+Z

zPFpWMJ6A0YMyDI3ELWFVYRed4494SVM62GXBaR4M2WYt+NoZmkDgxK8C9faygOh39ftShrPWx4c5e7O

erF5Yp6draIe9ZSI/jPuV5gRBPr0GBF97p9m13x88d
sE0ehyp2YfWumpPdIfo+et+fB9k/61DGlnl/y3l8/qEDH5yDcL1f/UJgpybw+873ZerN8BD/zts9jCwO

AWNBilH0IVP56mCIB0I/nS2DT2gHv/QeFefnQ/A4x8EFbbaUqA/n31Bq9FM4rnlnvyOFgk2WiiM7hhlE

Ua8RyBqz5vI+bRwA5I607K3TNslYoibag6ctBB4Ys2
7hoazjF8/BoBtf78vMXNWB5qv5MfeDDzC7vmCam/sBaydzlgnNJr6OWDWGoCdBiv7lf4E+dVBr36iarf

cV+rEwjGKRbP42EqLsVoNpxKS9UdSm0/sgk+IZ1c+LiUvHxmrMAAlNvL6lhZ3rrBYm5pL6hR3fYiwT4b

3tAILtnqqhc+MQ5xll5r2KeZtp88EUjm+cAYCK6n+P
MG2blM+oOVhJIQ/p/LrgzYUrk6/owpfD73S/a8tjK2TLXRBbiYoHJuSGyfewcWt5Zp8T+I66EtCWjWh6

oSaCZCkwP8/KEP5FBmNMNU6i/yvJ1Uwp54Pbu0Gevlo6athDVnLI1prMzlSwNKDfZF5TBfr7Fui6xFaF

ZtT38oDnv6focktYB6vG1ckIz9ZO3fx1mOJwD722s2
7FLmtuXzfQs9y5AypS2EK9isDJ56Cn/rhF8TRp4ITjq3zXSXOePFKtF2MvtoUwS/E/3/mxzFEwpuZH0V

1JZ3G8ka/FHt44KNs0oaCBxANyhTc4mtpy64NQCszpDcQ1gR+HMjzTBqr+lIhOP1q3Anii+9x31hr5/n

pTYYp4xAPM1jr5ClzYaBbhqO1j/Ag5aO+ue+bcjd3V
/YEsjlKh2GBXudwlf5awXNBfaJplQT15yjuT4YvmVAQ0fwGy740y5B8edcDy0IILCfc2ggf3UJta9jKj

VGHsZgG/bzYycLYZXu33+T96CzHfGVV82IGfwkDXGsE+mdghK/W8D5sTfuKCmXUiqcNksKp5O1WjHcOO

TAJ09PKP/Yd8RWCQUAxGB0RWUlyCwXss5fl+M6nwE5
wT7ch5m4ff+/wgC8miFqr51nHqhlp6Zi5Ef31UdulhSopP1r/Xw0vh+yHJU8SgNMrA4tIEn2dhkcfBG7

6kJdmtt+loWLpfo2VfxLJsg9QShaw8C6lV3CZTAp4QSFgOkZZhxIjJyLxZQ5wTmBE4bPSacuut7aGxOG

y3IHUTIjMvLDvSSMm0ODU9a3uaXKAaqNNKzQRNgADI
X/x//HyNDN3ZCWH5CcseETcg3gojS8mhbc0bd1Qlqu5lKVZgePtG76xzBVGNqU4NbLYg/2icDtXg6G7E

+a9Gm+2wmcDn0836/OLFHZW1BTXMIaWQdnZqOtoHbwsX2IMo5t1XXOueFQJieP7xnX4U6yyPDAbNpjck

gJeVQ/0I98Ss5rl1ZS5z6/aFh/0KZZOQ35mgmlUHVE
VkQQF1GYsR1AA9kAam3fbocbUNLRkerGsin7uyHwVaspop2GK5U7N4KG4PagvY9+fV49H6HB2qIGU2lS

3NNLA0MmHM85YR1p+RCj2pmRo1WHp5dmUh+pNM1m7gPN3Tos+OUkyGAkPmJsgbPZFszJ56XZWPOSjmqe

gs0JkWIwpWCW8QungRbCkkbzenQoSkx8ksA7/Amo0K
Ex6oohl5xeg4exuT+KFSWX9bm5MeXTuvIwD1raCm2rOyvCEn3OQUMZG7JAEU/ugtB4w3uwv2u0PWG9sm

7LZMsWL5V1mIKFOgj7EI1XvcSYw0XfgmGEdvU/1ZR6SX04EcQhXSUdTaDjVBc+8e8RRTyvHhngTj7l0A

FA2y7hiKQsik7Bv8ZcZMl/RG4Pg8pzXl0OlWTxJTx3
ENjAUTkSeQZ2g1LHmx7zl7uoFXC+XE1p52DW/B/JH66U238qBV8+97Z6VFVmtDsREq7XhEL2b9VUBVPn

iDxaiMeei5A10E+jEIurl/zBrB+uA0/fg/jBzgwOPCOQ3vaj8c93KED5ff3sbGwixsuXIsehsbPmq9rq

zjkgHmCyi310AFqDRsu0Z+egW53dd7bIG0+wAwR/o2
W9iLMdt7hOtxtWB+Kjq6YJWFvJddikA5Wq71a42ck0Z8R8u2+QgCTcUvsc18R77iuTyE8eBdOZD0CEXa

hZv3C4eDUzGiMJFrpmMtQZawH7HQ4BM4HA4jQ1H5LXpAw3jGzetYa7RHxuss6mAbeS39/tGtcDUJURZw

nVeJ6nrlSj3BKCNzKdv4OUeUcuiU0ryZOwACBraDk0
LG7mdeHVbKwGYGiIdjprV6lU3g0kP7c4HciYgMeDnwA5IscztTPBo88uGjAcCCF7iG2cPa92UcU1XyRO

GAQ34lJV39PkzYLMr2mitlNw1L2sRP4bpEcKhqQDvIJ1EoIOoCnoR95FW8ctmB6NpU5blJ5D4UVGLdcw

bM5Cn0hMZLO7xxyyWdTD8a3/0sPZxQBSPe09HoDrau
F/128M3B19nSmFh45pwD0N450efawakJjZbjY6bgrqhUYB0jdyy8SoiWK3m0gPO+clHfrRGGDV+NB8YP

89OAYFdiHdbojcgEFd6uzynnElR2CXCbIq8SVw+9x1CXFwnBxPB4RP5SjguOHBGUhiNG/vuAI2Mo73Xd

tudoDccHj11UXYU0SzvCmL9QaEUL+KquQ2OV93RSvO
p+4U87vU64JWcnhXKbyYkbPJI9om/ZuBBp/aF6yYWWZtcOvkfCxEi1bZeJYBGydVQW1oR7PwdDFFP0fq

a/oOVeDfflF3yMGWDNBnWGedkmtYQc2M8zjUW7L9+5SyOM6JpGHfay+n9VXwRxrSDEFlDhuXVE3hOQvx

qtMAN/4MOr9ZbbbBIgUNn4HF5CV+5m/yQ2Dh9K/n93
kH4IAnTnS/EJ8N180DP5KW8oSN6D5+7IjBC5bjF4DZOr8XG/ZE6OpTk39SXnjpgkzgtqBybKZdzxypi1

9GZsZEulpGtL11cwRtOAThjrhJvNg+MUghEmP13CC43gnTrnebmt817Vvp/KoSUqeIb6gykpbdP8puHn

nThwOBXjQ6Uakhuz1irekjUKz1N6oX9Lst/3Ztb8X/
GiacnqB3LAbrDcxFILopQEi462/W/0zDLFYySP3LPbE+wzOwW0PbdD3QWOo6gI1kZ5Rqo8L++WFLP3fc

6QAPX29H/0aLiM793X1TmqUyCamQ/t8Wr/ygVXhYfJfTCpCBgxbJiOxk2TmBuv3QI+VB2DvoIGSabZy+

4pzhxY48JTt+RA3PqKX58sIbxQ73+mWcOGxnbnAie4
JzkyVyvUQINWVzmnLE4WYLYc48Xk4B5JBjpTOyiWLHMe7qjs39d84T7bp873Kxr/lm0fEbGf08a6tU5u

hDqYWYF645HxQ/RhS//CbBdr36nuZIeXZvz0c590kDbeGsDBmtQOKxvKUYtTmX+IBp2YHDKYOZRijMzg

dvN6kjkBHMU6N0q1KecePVIyZpLOc4lL+fDLYyriaD
f4bXYaP4USBzPkuwkvv0VwoDvW9l73e2vXg8N4oIwOehGvSYEU6jglRfdijL+ZoHuNTH5t3Xc+j4nJgD

EETopQeL5hL2bj9jui58MEesgrP3gpH6QonncFLJSYb2guCHqUUrGb0ZhpNVuMxnue4SI2Av32tyBAA0

soUNof6+/i5hj0wa9HWcrhkOBJUU0BOhS1kKrkjX8e
I+g5sKH6cCCuw/j0mYVrW9swrOGCwcJ1SAF1ip1lpW7hZAxjqgFZy1xc8ksu3Ka1YLrw+E6gCGIJjXyn

wItE8v0zMyeePM9HhJ9OGoa39+InFpSif7Tauc/kTnJFkYnAjzER7KjATWnz0U9oQOHd3o0ROavV8xcr

iry66gKBe9NheEhEnIFCYEx5x0V3irw2/aLImtStLW
2O48rng1SecXvSV2yBuEgELocoESjSKCIYJ6l1E/jIrf8xzD7jsLMjM2h0ZDrno8G70sPpSE83tVdXI2

Bg/o41GkRynKXK/Qj5c+nFDW7lrHmUWlKl++XccoCAz1Wxgyqry06s1VmP63fmwFvxyOq5kBIdJtJxQi

SnpiRKrkjWe1rPnaNmts6YC5Zq6zAHEQYGltiT2sU5
KVY6q/Go2jSD2oHxynsVVvKxJoG5ZPETjJkK4ngs5cPBjWBy8T2ZVZ/KpA/ap1qsOdOy8ZgA0xYAOojR

V57g1v+q3EFCVXxsy/i5cnn3KsmRJ3Pxsn4X9WQ5AoNG8EsJlRWT+55m5xN+YWYMbi91NvcbIofM1NZy

o5iFNqAEgCFaxMjdrirUdS1e9XhSXov6U43Y64VGd3
vIbtMEl722fE3SUx5KjjVR+aukSuhk25+8N10dCkYhu/8H6DffsPmAbo1gr/6tna3uEOS4Z3GEt4zKzH

I/ZJ5JVO+Jl5IXdeH1ZslkacBjV2cpwHd98hwohNER5aXaVYj+mnDmlu8o8nsAXSXfGrsE0z6RGEI2JO

Uo9VibNdnP2EZAN2zApjK9ainzdc16DXo8KWUr40D9
qkEUydAW94Dzm/WUIaEel+zguwc5tNkbbZOZioVYd8dQ6JP02NiW5jt7uAP0fKR7gAlueP+4SCLT4pIO

xi6piFgBw7ptoqQNSHcHTuiT14hO3dYWOWmDts/uKAndj5JHBcR8TpSFx3r3GTcXO6nzt+maE1ZNKUto

7A0F0ilfT3l2GUYtm2hUjay0ihr0KVFiqCkNOTmWH8
TiPs5J5JdYFrxtb1BufELyRa1GwagMbCYP/qF5QOQGftjU17jzIazXkZ7YiyHf46M5evL1MgHyOMmXky

TTZ6L5hyyi/yvS4vEJLfXv6hcRaURN4kiDCIUN/kxbqHuilN1jaxsADH/2ESzGirTOc0DlWg6axNW/PW

47ElLdh6CdCNheUPOIz8K1tH8ukliKOioenS1tr+6p
JAXWqWLxoAh3GeWl91E1OPNqSxIRIfRI4KbZXknCoZ/2PEHQXsTUrM663RsuM3M7aUmDr/B4lmtd9JMe

LYI5HbPGcY6BqbN2YVwnpXgLen1DHhr2cPwVbl7NTyn6LI+3584kHDdxP9tMyAT7rJURT9IB5IyGJI/J

EHB6b00zhVgTVzX9nlTfucXJcuUDucpVZawtUMBE8m
cf9Bjiwhm5HLH3aczKYgRih8Z5cLG896NMaFtZiuB7f4cAjscBt8RXK8OgdNvUbUG1XWy4Y3QDlDj+6G

u3ExDZoSn0F8DrcCa+agS7fWmsHia0TUohsjnALoMPHyDKM8mMNsOBswnnkG0QM0HQkAIMU8DR4jVc/D

BOgR69X2n2gqEV2dWbTj0158VtGY7vl71JV4mzwzbx
0qJPViGtAw2hfFohazydvWxZxd+UiSas06PsuabDUzlSwbx4hg2GkIpMJYfTjKACS+/BLGkhdz1c4Bbj

xJv8/X7Cl0yISa4hBobvqZ8Av2OYIWSQcd7ozc+sm/gW3rR67oAr7+CyOLq83ILD9kGzi6I3NYld8OWU

XDpM2QLvmuCqT5zDpIcaXrmFUZ4uApzpINmwEf7LyG
BUP9dMkRiFc5v3Ubjaydb5UZDvlmewtX0+quQIikSS0+bgMWqBzcnELnjcZYr9ylJEFeK3a2ZdbfBlh7

FUIOQLgS46KiRo8n0CWDwnSEbQzS1JneqI1lK/mqCpNidlicWiMSCWKvePVOerQHnpin8Rspla7RFCoN

f8NGGCSNT9ugckRnkNqx/O1GIqY4vv+waYl/O1xZ3N
SdxilWXODz2pKpGTcu7bPiYs2zbS3gliNXO0vCVLFsh+SDpeaQb4tP05ymzFjhYewfsGtD7NNxixMB4L

85v2BFR4sFubzk5D1cHQLip28d6C2+mhnHpf4H47cUi0ymtCFRM0jCsAhabywvqkogWL3gacuqJkHc7v

dkxDSkSIxM2wil5IDudxU1ueqe9fQfz4+cjS9RsXNB
kh0NI7hqTRC7Z7O0NE4ntjwqlQwKpYvCDlibeFKkcNRrBo7fa0QyX5JrEvocgrF0QE7Ar7DQGvhSS/vv

eBFEGyNWgpi5t+9jIL/ua6dI9dNmSIVUS8gmldOgrJkhQew2LE1HD+V8rGVW93UgMddXW357O4ZEtssN

KzRy0NqB6qaAa2MRX0GsN8S+P1z+oFvf3Jk4jindmX
vC1+TcTMIyL0ptx+fK1g4abYb1kzVtF3H29bgirGfQ2Zx7SnXPjREQ9y0sJW+rO5cRzaacJ5ZT+SrUDm

vJtFClw2BnYzAO8V8eHTfL1sq611crYs7wL2zR7JLwUJEAtdH2iuS4E+ERArY15kquR+3ZlLQgsTkHFA

E3EtoOXhQxm6/zuY9aRH7I0k94bYwc3iukh/cpVH34
Z8W51C6fiAxgz6wA+h2FOisBAxXJDv4sNZa+JMffs5wFlYhESOW4RtR7quYLnBiYzB3Z4sZH3Natuwmp

wyH5ZwgfSvDh3QghVB508QUm0SpVuhsLE4YA/EXpGW2oj5rwu4928X6jt58CPOsFva9Fp8ITO94OAfi1

9Jt+WOLbI1ZCj/ms72uqgkctmCN/wiGufruWGiEqoW
qRGlIbatb0mgCKKU0ApPhR2LrkXD0blC5Tob68SQkjmdoDvua1WYbfQqt/kO3pGFP+L/1LrXIHJFWtoz

FqJ/LwEfkppJixLK95rcpAOFGfVj5cepcUH4TI4KUWIJDX9j1S7pO0iYCGrnvNO7vNBvNlyKL1dVVIvm

VY8fsCN97CK6GPHimrm0j+lZNYlrOLBWAN563zL7ca
Ic6d1eGH/vgY23zMgEAtJpQ3a/bMhh7HvL5WyhxkvDU0Yw3mUCv7i8HDFVv/HZvEbJVncNFTZP+ZwRf/

6ceiD1dfIffMm5nlVa55FlTRyXUtj7jz4qtL4mNCIg5+1DSYHcHZkMPYQEuJ7jVXn9RBtzf+gSkKFBQA

UlpqMzISffao8qCQlezJDA//ec9+JX29vv31pa921v
fn7XfV/C1qdzO7AGTGwzg6jKtQIqmVr5MPIMfqvEFnO5bkIIUAkAGuLXPQzZu5UWEmjcn1EgTZjG3hwL

fmIpk+wDX91zarEDgwnwJVZmd6t1hjheiEjB7e4GP5EKDMkakvrrwBc97wbsXrB+BwK3sEMcwrsw/D2u

OK7TMyTa/FkwMAgVtOJL8R354UtQ+LAqhm2/l0RGmU
jrRUiMhzkn62TlvLtqss2EnoCcNKeqVo9J7iC/sVSvShufdjM0Ld2R4GccpwQXkpC84lrctszT9/SQPa

HJEVOcFosgdkxr0Vq/Xtqkfj/BDzRJimw9Vu8DAYUrPIxbkYENnO+7nVTW9t0bPeZ+C3AWNKVEwlV/A1

hx20NoFupjUZ41wnC9+TKsGc4+GvGsdjA7rMplVoYk
GLjT4QUTf8+WR6k9IgE3CjfhKDm7CtshnFTNzCIM3VHggD0f1G/elaa8zZwbgV8VbTKivVA0QYZvhan3

Qy4YiLFmTJRICshYr5njcCH1sitsENUvIdYA/BscfVH+p9wRenT7oAF3fnOPt0xY1uG4HfkiCnGxX4cn

FOmB6ltGlKrgTeoYXdnrG/CxQ5RDFK7AxvjzknEpM4
Qea2x9J/q83th0CiArlekeiWlllGivzGo1BP9Vn4h2Up6SOah00nZBUcec01YDfGcL4a4Bhq6FNB5SoN

UN6cd+y/p5cz1gaf2/2/PaBJ59Hghfh3aqi71FrEPiW4Jvp+vSeiP8pAvtSHekEcd9NbufmZzeaMoicu

tA8p4RnOneWNK6OBTS04cCvbd8Pa5XWPNUj5UMGoUf
AFq596/o9sVQN2ydNgEFKNT3hpRCtkZV9iW7R1ERDA+UE3Y+jOALe1asrg8dBcOLk+vR7w5jLAVwzvTi

YSN80/2n6kjvP/sp6y1sUUmmSvlDC+GMXPz2/eKVvl2AU2zJeH4BH7aXc1wILSew4pxSq0yroMhvwdst

IEw1Xg59kqmHGkMnt28od6YTEkIk41/2oJ9a863toM
i/8M2UlPLoM8HmijPJikqcdc0pmrmpljkkgQr2IzCLjrVWpl3Z/YQ8cgk24REEuUmFmqkPg58f4H1V3E

DbU4zEGaAEKyKTMJ7KsuXBGvbprt5PZUwTIt6xvAYrOl65B5yFqbEouSvClAH57XIxs48tD8YTMxBhT7

f50X6Cm3Ku8iUvudVRa9SSTkPT+uFhFqfR+qu2J6OB
CJh2l6riLXJ5CcxRXmm0cfGE4+2V9VHGdM5AQ/0cQD9h8xSye17LVzdti6aZlbm2jRSB4P7H4OrFjXLg

K+7Egq88V9c7BTa5ri19NtcFRa4jz1F1MWxR5XCtCbwOp3KrgEmzv6qhvyHvXbKsA0CxNEC42vEimGdy

08SkpD+wSQ22SG0CXCspJxdV2uHw47ZnV5uhD0NW50
sG5wyob4nKXgbOPXWxyUvx1EVNSp4rBtmkozv+fE6g1/E6tLwT+GHAeWDp+9sPF5iUJkw3LQ/IR6Oq8U

7wKZ9ziHp7fAzFctB4XTqUvs7YCVU31RO/CGGqnVRm6zL8tHUaxiU+4NUS3rMwmZrXkGmItKULzXht9S

XnXgR404KTgEBN/aIdA1qjKL9Am4yqCuEjYv4q0RAP
RFiFHSD4Z2LqKBaDDlU8z1U76q6FHE5tlpmmKU7dNwL86/+2NbL0Kq3idHPVrz2biVFt4DOGs25+a5tW

XQlTZQr286di4JS8e4ShSnuShoffDCsUFZkpoSeBuXTtcA403n9VGUshQkAZ8u6Aryqft9WLAgmtX/HD

XdTwyGDjVT3gDkQ9jvxDkJc4yQRSANQKVd2YveN34p
ae7djqwfwz0GS486x9MtF6DRcjZkdo16sgUWIvFbyAoCdFlzQ2rWD6l0/hTUmvp+nPA9WpupTGf0geAR

sYp14+oE/P8sltYB+6JJC54n9YWtIULyipOb4DbLyUpnT2nqfLLVX/Q7HjOV5AgBvuF4JO8SU8zvJmn/

amvLCgcalehpXZs+3Ewn7aYZROuun6KG9xlpFYkd0S
w1oyt53kbQiK1Fis6ZW2S1YrZc3KpiB4u0YcxBNAL5h1Ido9PoCvPBlNmzg5VMXKExXEPjcMNcmmXfvh

aECsViNjaFK4CNpRM+oc5RGnQBPC/Vhxrtzup29zgxR1c1BiuV37yU2hW4dSSGfOxqRfHfT+KsAbqb01

uAoZNZMMnALn7amzo6+pH80fxMYkLdByBzY0HEuwJg
sgPnm8pgXgZSulTqjXhfG8IBo+2OYcv+P9Uxmpts4eLqmU8zS8Gf+9yflsi7ouj6eNFy6DRWAAtgk73G

btUfyFL/tiYPNViIFMdGmE0W5Zjc77O9wAxX0hNS8p2YSkq2H9qCvpZ+mKnOWrbXtMaDciPKmMUFoNVV

RUFzRe30oWGcYZXs0B2Qk4ltBhfPxBfdy9tU1jhqF1
Hoi54PENdMzyNiJreICB8u9t+UJj+yWXzBzWmlYviSIsmUMloImNanTQ8eik3Q7syNjbhsw3MK7KCH9h

X1xAqOnIkcf4zW8lNa3ZjM5fQrNoXFXvPRTwqqSc49CLnSHPF/OmAnA7L3Crar6lUqeiB8DPVHBt++J9

SDA9jgVEabXxgMehuPL2FjghPVk8Ii5Gc8PXpgZJqr
RxyVkQDt9h/tU/Hgnj2J2xXWEb0QL/3oj/kkWIBuwaiu40DkurQYVMjril1sFzL+Bb2yia3riUApbW6D

XWlbFlKKbT3LJhhVIqbuUbU3rVtGN7rokgjrhP8Qjv7FHLW5ILOilofczwJrYDbOO5JrB1tQdIirhqDi

DWWfWqDCK7S5DGD55syG3+4I1+tPhoob08jMPTrCuK
iSRmjLVk8qJgCzo24GMhoM6vXpjAywl8s9XwAjJtpPA7LfTpH8CyXp08/axtdyx5QFWp0H3uAsp4sTHH

4FEsKgRLJUV4em4PTOMCG3Mp0JS5g7TzNlDjs9UhxE6a+b3S6e4Ql82grn/qJmed1j3F/pKPHODY+ymT

7/QsrzOVxDaQWuKrQ/FSfbDaT8Zd/aPcZTUHxZeGvd
F56bpTt0luOAhDz08sV0THgVPj5+iU6tPOcrgMIlMKzLHFkQns0aR3qLQpltAWDYIP3Q2rUoG7PV/hpt

LnAw0Cap9VAZp8mPIHMyvnpqjEa+MYwFTUYV12G2E3uyov2SHHtG4ifTsT5ysJcnTxzDXLEHPUN+5XOg

vzlWoezvVx2vOm8/dWauT58FjmAhWr5h4yCy0TZcTw
Feryu4L+uWfC79v9o5/ioMgEoFBdqAP6ZQRgL9a7hdbpuPtK8rlGzIdiJng1KsvmARchIpy1kaypzVjA

Amjt/kD08oJh2o7TvIkNinkUBpOuxWoo77wudbn6F2dzK5HGue/t49cDHeoiFBlNqLxL0DRTy2PXaevs

6zpXFpm8iFxvkEKq9iXv8Qgunm19zmmgTLp7b89jbl
fZJOTIiisMsomBew9mJcuK5Pfk9ZkstxeDrGbTqJ8/UJXwzNVYIUBKDIihd4/yuwcsKyR+szwwWyMzAi

TyL04LHLvc0FvyWSaY43PKjj+n04uXmx0XvClNjieyWr9rvQY/IDR5HTce7Y4Sq/hNHo2bf2zhX+arl8

IYAekdzFTUTVLbIDMisWklufQnIQDr5otowkHTp+f7
2S5t4KLj3Kkb7hUub1lkW5+sJqbcRb7AtzIaVDu5f2UWz3Kp7Ev956J9UDBb0eGsVPvf9TGrGWsXyl66

Jose+OU4T+XkTmfyxNCE8hhDHD7FNhXIVPHZxOxsJkX6ZsOLK37zrb++EJjkOv8l1yvGz9WplAVbg4Bue

SzBd81ZuGAVR+uHn/1H16Uh1O0A7ISMatiO/lLw17i
+Ym8r3LH1OLoxhRdzJEpkWPaXDjMA9aV4+8DZ2ce86TFHvkonXjVmwp976b5CGOlx6VCTYgbY4uLZGod

YpvIeFoDRXTAeTvCEq4MilCaLONiZ2hXf8sBgWfAnrCVhPdg/thWQEBjw4+ktQaq2i1pwarpsEm/h0bl

554PxGG/NWwS0LIPyZnf/Me3/n/THrC6M4ydVy1DGk
t0hEsSIEdDTVrISi5vz3yFwgFpk9pis1V84To6ymlL76IBfE33zJLYWpnQpHVDAqaGTFWvM9CDVE3olP

cuPcE5QdhntUb/J6ubr32ffGAivEx8N+LWyHZt4tMjGp3gOpBrseBkOVzVRrfoyylKTO7wYsbB0c3MH+

GAFDZ5D04iwfS8lsEg6ljxA9tvMd8p66azCOkH4lkT
25PFu5K42IwVa5sTVt9HO4TLfiXEaAg2A89VEMCWusMSntS5bi1RVY7mmO54UHY0BwRgi6vJKYp3dfdX

EIcSx1DKjYUOv/oQLq9lluFX0TGbgzMdZmayG+ePdWvQ6/Sr7pT1veohpYOpPIhpL9iuwd5MyZXK11hf

8saHQI4UQzehSOL0j7oFIhgDisupeFqa5fNDwiUJ05
e4SI9k8jc5+yZj70V9RT5CjrbnkmLLzfs8+CthhRqk8BmCO8oRQwKvcCwlHGT58wcWrFHhFrbbK87G6i

gDJYr5iNA1R8gO030e4ElcEp82RxoPQOvy79fUh85hBhU4Oe23JFTHL2DgmcoRUr4HzuGAZJtT0EdlJK

7PvtG57BHF4htwiK2hnZfrIyR9nAe4T+++agy1wSI/
78QTuAT/anlDZmKIPg+bIMpaVvDm78AfUDkNBe/PvzpWCwyryzahd0XkVMrYzpRFTkTP9Zu+lHcqHW9k

gW+VyrLCfnpjyVycBKRq7YqcOYoDx+7Kw9sq0A8kdP5adIj0tiF7i/9R6NMalF/rHhvPvCnNXL944wwz

KRgq3N/5R3Nf+2dZd2RgbW3tFrquTv/rLd0BGlDog0
T4Tdx7M5o4lchbm7SGbxjcZxbi66/izvbc825LiDxm8DP+9n/gIChxFV3QoZqK78OX2hGe3ruwxTu4ul

qmWMsPuBZVLAaWfaounPA7azZ0MtvkFQgtoqqokQBM0obuVntHVu/F93QxHQbTse+pPxpIPzAHs+/wpi

/q+Vk12rPOfXqzRXDFO8Px6ZcGZr4VDmzwYweX7AAM
pzanwisW6a9XkmmPRnAsOnA5/Zt8RQYVmwYLCVWJhgABNlvgaSuS01VCbuAUeVqT7D5xnM9vLd/CGVUj

vR92ux1ZkJ/tpG7KQur7VmmYAi1z4nL4bIWH/VlMjAbD/XOxsW8JQ9RhlK/63W7SgilBmuNiNRBXbWof

Hqs6T0xzYdKu/ib+N9vDgtUlwUS4fGxMCNc6pf5IBs
aEcWJ3Q39eTzSZN2/ylV4AyCBkpFy4mY+TCIgatx90tG4UMbPCeB9i5cAiwHQp30hjIEqutqkLzB6dLt

UOLumnHTMxiCXqMKFnn3Nkb7IlCbhLXBDg08f0JK2l5hQAt+8/se4CYe+KUJodzsLe2G6T9N9hO6NErA

v+ljddYr34PuJLU16f8r6BS6n/tYQ5BRK8hE2i8Vs4
B11aUTgdQ8Z9EMNvc5V1oil/Gv6nToiZchRzWWzAvwid/J0/QX2r3SiqOIXAX20LiGl+lxtbtim+m20j

cnmTukbcxuhhojFP/jWDRrR2yODV44/VjukSneQffrqQahnbrakuz1saD9EfJQDUBBSy8g1CloHNMQ59

OOLJkMUJ3I2Zk7srYPLKAaQtM+LpeOaDzPqdxphCKi
s4tdy0uig8Aj3lZommlauJB0nGLkTYRHDF5Z7M+1isM3cf/1v9Iq6F2FcYhMRO+YQ5T9rnZIHJy/vHwe

gpVRy6RGDbJqAs2FJJxi+FGfQFApEYSMH7VA3jKg0z4D1P+qCXPqirLoDmUpp86wV/bY7N9Ee9v63h4O

9qmDj/qY1kka8V+vnpRO3bMaMjJwchWylyrvlBT4zF
c939p47D2ZApTcDfGscc15fO7I/cRi/HviXN19PTLqIKKBI4Ufcn2aVGV5DerWxvCjnli5ECADYF4DrH

OHC7/ARHl31RexFYCKZmydB4DJ2q0pLL4pzo5d3fwYpYk+eNv+1KsEUfPaCIaARMQqzRdwf8gfpKojyI

P5+f46eHE1ItQpGwrxusWP4I2ca2mz6y3aOAo3OX9/
9YvyFIyMSRP2l6fGd7cPq7L+GpxCz23juYXDFCY7ZeUiJrFJrICdpQAar18PEYvIJelu4zWw0jnDWsqe

2zuyWJ7srvoSb5SVcxDD5OfaJmFAQkwX8dnj0aQPu7LVJILxHxlMSNxK7z3k+82ErWGqG7LWwhe5/92g

01Cv1H/1hFQIbE3N6f2qZjU9hEfZHrNuIacy/enbpj
i/gg3JAZqvZOWHmkSGwIkCY4kOqvqRI/k2Rkk2eP2d48qaUr4e4SY4PBLC0toqPD8q8rJK5XwtVlPqTZ

2pGwdXwYVSo4JdEXEnOJVSG1PflESMPnUP+fa92lWs9OgjfqJDMNGrVU22nngs/4hkrphCE2EP/nJrSa

rnFpdJ25GRDg7rZI8s7UFvK3k8V4se94zXD2+wpPGe
a1+HoDwUunM1ygyv8j/hhaksCWGZWOCJ9UJHCyx6ge1pMbj/He1r+Uewm3deruAXElZTnJ+ssje8TQ9a

P9MuZiWYteQs2WusDoOVxqYzEhsSe/tVSvTqaw+WRw0UT9Ea84gT+Qc6uvucQ3+0KxeZfLVnUnQbwzLq

NsfDyyQnabiX2wJpuolIqgA8BogpcDS8NtvZy6ha8+
1Bvp05N4MWEyIH2HsjzICP8TIJLRPt1p086w5xgO2FrCOH/cg1PfXSZ7imwaoxhEpnX2m8Bd7sLkTtko

NWfaYXRoOjQLqv0dmmTavoHHmQIO9zqXki/luxpUsXX2hFMODd5S/8hofQWwxtiKgYPDvbIZi8IqSvEF

JeuIBlBlVvhwLq0Polf7Mjzq2PBc7ZjkXYNR4pjI9f
PYZwjyTQYrp1qOpADYvcYZPbjofX9j9Vx34Kp9QyN6CdCKxhDtItlnv84nbXWH1xM/UT4SewC62WSVZH

sxdru1JlnpualV2rMfd8I1A8q5YPVfIZQA1SewnF71QBg5Gx5UdkUeulj9zprS/T27H9japitmM/zcsM

XRP5crCLPWQ1o3Z1B5idf4dC1cMeWkxuws1d2j/pVs
jqZSuQGEmMVG5H9svTQa59V7mMC5pCN/rr7DC+X93lO9JlqHiwbvcxocBMNZSkFfz8O1pNnZ27GGypxO

wQbGe+kio2T1yMn0Ae72kbknkgUarUgVzmxTFZnBy5Fg/9MA7Z/7tade1tbr+xhZz1GCGeFK+qjHJy6V

OSY/kqWPO9PKx1fWEHBG6qittgbsC5Zkga5VVvYF/H
juvfCdgNvDY3LI7aDu765aHR0SPdnu93CLg7f6LqMf12Uw6mVAr59gOdB8sPXwKCd36tmR6ERNIsAeRo

+3ZRwoE2E4892sc1BZVDIGyLxDZeDq2W1BJDVABTQk++eGgCjnO98Nwsui3ER3l3mguoyL8yxAex4wSx

Lv79554TE6x2C1ry+gomy0yrNb1oci+gUbigVNWr/B
5Y8KkgaCXxspsHCThTjOjFY1L3LDPqnsF504QTVCCTlzR2aoHZN41kxCb9sHnh21kcmwDKQCR4nOzA6T

HsWb0yeO/7H9mUKymlRYPHCRnoq1j5baqmtJJ+fAh8NWNki/14wrLprMg3dSkZkI1cDqiylepl5n5I4h

XYUNoSWaNAEGM8S9yRT4S/h+PyHf9DPMT9FMIeTX56
FmBQidIHSleoD0QwrkY5wEI5ypuh8F5wMUYLQdnW6cSnRvU7i9TM3GJ3spEluMZY9vTzD7AppVoV6XVt

D3NyNXjx2qPp23PNFnfUfrG6/Jhm5kklKXr6wvEcqcL+a40mPNRsb+7eNGGjlxANkXF5bc09x2BbV4Zw

bE3uCE9J0FDI3cxIwcuQ3Fmil2mUfIkTi2d5B4y6ec
SmcZB/mnaFn4w4AZwV/5LR+LmBEX7kMwyrpfzYg5JYOGQK2JLeumySQQ2I1fEOmcIHRj56MIAKnbGtRm

5QztI04lYhUgCumUlE0gIA3Q6m7cFNyeNJYIGU2Nx9PuPkZ2/8LFw2MSOwxyKBoBrYswFXXHHF5w7isD

4xS4ujxlOQi3at11jca1SllHjZIn4Pc68tDHJxt2Rs
b96Dpt9ob2YNdKTkpGaGXiqsGwmNzhwRhFGUUMEA4EI9oooOVbEO0IZGumWD2ysANsK+edylLegQ3E3I

OLKzfue0GL91Cpq+55yNi6ua4OO1JAGU/MS9he4VIIbh+HxSmHo8OhVr0BEr7jedJUM4oBKcPiE7owJQ

PwuVEu00+zvzBPidPjxP6bQgVR7yAWNQEe7QPWZERK
m1a8hmjYeQCjnpPvhh4arn+vhmCa1J63/cSyypfIf0pnFMFsqfzAnWwERPGmagIlMu23xyW21QwKSaUu

nxxElOmd2Z4pqWFbxo83u57bcFqj7coRfz9LDgsVXmBKFayK1vMVMOj2sBg43cbcxPXf/q3rZ3MCZrxH

kxQLj3GPm92PL+CgZZEdqTC9T9AOWuOJs2oZqaIhjY
Px0aQeIoWnpXWnx9R3Dk4z/P3cBeKmE6wpLNqfF7bRRYZnu7ju3NmSB+fJnCEQwoofxtTpQnlUdRruzr

VAk0QXyeKWiu2dzAZ21C64ZKMy5MJ49S3VvWHZF7AOB+ZcoLfU2nUnrNNmBBgOtNuC62VE2sZhUbqumI

kM9VpysKFtupFnaHfW5cOYr9pNU9oc1cdZSrquzy+c
f/iCt1VGJxCN0FFP2L79tSsn4I96pISTXd+eu/IWVSVPC2vovtz94/dctXLJp/jJSIM8ruvJRtrlRve4

4UJEmTb0jDvZNwCIU6h2aKlkVgq728EVimPUyntJ6QIpjdSY6y5X/NXwJdeciTfSuxadJwFF4iIvfhtj

mVn0+OoYxTL77/QpsYsoaG7hB9kazUIClNCMnbkZBn
JSOoiLw8k/UW7EZgZird+GSkWf+QCPzNSXmwfXRCOdncDCiMO8iYcegOGSMzBiU3Ia4b8Oq34NGp/NuJ

5zdZaa/w1YS8ZMSGK00gUPzyO1BGAkpRwrtXW5uKO0USRXjg+elT96M40GUHIPGnsUn8VmXlRqMhMsaq

W+vB8PXhV2W/b4fQY0BmzCX0CF44cJU8RLGNhtj/3V
vERB4tapdDfpSSBdd3GkhMm/CSMLfr+kntj1JKQZdlK22jwqsWACUICaM57Chiwsd03Dg8dtIMHfuOX3

orKfgZE3uLvw/iUNbOOWtI14LKa6q4fF1YHZmRKSyiy8LVuC5hIHgVLeJy0eerotrqg3Rgfr5ps1Ln1a

ard7HbcHm+Liu8CO3z/nG07VFxoX45X11Z3FfzVHnP
fNoIHoCMU2ZB7FwdCOoxNc1KrNPrqlzLIx9WcspnwOB52V4vPjejfFgMekjyNdBR6+pY7Eoz9FoZTxvz

GmLnkNFGN83sk5aXt3upg7fsdQ9TJo5diN/ShAOzXHA4A7oWLlvD0I5ZzrWjtaFB759ETjgcOdI1et0r

jU7L99h9/rB0CgbXDycjD8u1MwzktA4WbJIeuVaB05
I/Hc6NvcHx8caxHNbdu2Tv4BvcJTVpTr/7NhfWT/iArfu2TYx08T/+RkHfzkePfm0ZFSVpbVpZMf9uQa

lVqw4DYYppIurc59Z/sMQQM7w4n2iCus2qOk4BY2eYJ4ezXeQF4hkdgXiy6ry8Thc0GYHWHc/Z0SX1kZ

libaxFxj0uAFtuGMEQzWEXL/COWHtG4xFkGovvFN0R
dg4RWLAGpPdasWZeTTtDZyLG45QVOnPtN026s+eWZdW8jEALn33MSxw7dLhlGOfutU9TtULKcyNQbqxe

keLzWSKwoL6VBteb6Z3whu2OEspqzVJJ5V98RHkcCap+owP+URG4gu9G1cjNp8FTvWzau6ewQGl539yf

MThqjyW8PJTsxPEtqctTIrGtEfBhjPYk8gXOPzeL5Y
R4IV0HC8NyJ1IiS0ogOxcYgvGJTtxHXjQJ/nckLvKUJY7uzRDxJWl4p0NKan8PFY7YlRJuE1FhBUeIUO

5hHiP075xVG9xI6t5HvoAc18DSkK0HIqzfu4yHG2J9ikg7EBlfSj2ySyGa0pvgf+Nl0tMBNpvW67uWxz

0SJJwhOCKAHiykGFbJmBJkmTVkILSsmycvp893kcoS
yXJ5A+LVII7WUdUIXC2fYShSgBUb9MT5UsrHarPEZZZAlsWqhtd140IVx43oA2hj0Ppyr7BSqhe3m1qK

QW+Fkcl/keDZ/0jn3d+0P3K8Y9nKMJAp35tOQGwSfsVRbsiBMJ2vBiQPX5CCIhwouFIF+Xw821Oz41a/

4+uYZMtzisJ1qJ8r/5E/n46YG6ZCsXSB5TSl4t0xuY
dCEcSUIafwHHi5jvWQy/qJNplvyPnFkr/4zjx9AojFhTrOueqE+JePPTPOOpvu8aOZsHN2mQja2mdWOZ

nmwDU9Mk5jReWZVcZP/yEQ9aUpkAZUTq8U2PguE64+ME/vpawVsvyDXQbie4ccXZcxAcN6NDMpdpe9G1

z0HWYEbCe3KWzg583gJPDh8Sw50m0EhfPlqVzuYS7u
MrM0CGIPqp2XEu0wQZv81wZtLo9oYzQ28s3O+S0nUyPulsa+LcrLdplCI8z2g5Bga2oQLLyUxgPz2/DJ

qRm+dOahJ7CWiL4lnU1zkYj4Ws4P+LRJDEXI9JwfRlNJbb6Tk7YBGsDdHxzSRVoPd4yGmhJgPPNq4dEV

KLdraXZZkCmlc2KZHapWqDLF0nym6cyl3FWl/Eskta
lqv0udoMZ6kuUhNon7ifjqm+TY3A083//+FDoNBQX8It13dMXV+s3Hdbcvmgn3CaA9KY7/ScaRzYSCxB

FbiSmTRX0euz1tqphx8S1InNdT3UU3oByu5UYPhaKsg4YYC4RSL6QXi6xA5Oh20r3ZkRRVEF98CH/VKm

VlgTI2d6e5oNFZ1qWdwsWXyzamMW0qcmVJKYodEuOl
PvX3PG0ytOVlTTlLfokFpGR/g0qqI9qk1WO5sIPIBwB1c1LkEBksRB3t/avT0+UO5R5QaSd0HOj8hVFB

oZdsIvddZJR9eVW2Nc62KOVdZpRNtEuuTbOXt4Rsiydn0mZxzPbsTuiblQ4la15A4tSy7nZll5OOEUr3

CbDbqU5NJ6Ocyr/65qirBxV7D8oa5Q7ZKIsOoBtt26
+Vj8iwVb6LmtHAQrSaOi7Br31YauaiGtWp9R8hGABAx5LLOjDsdToyESuTLcVuzueGsUz9GvRnNU/rGr

2tfWX2FHOKy+nU9Q05jD37wGfNwW3Kn9SAbMWoBV66faLZsgIuLP996pPHYanbw3Hkro9mi9p2Rr9i2/

pPDLw7wJR1zIHMkqAKPuQotRcs8X1X+vapim9gYSsd
vBK00euoTLWZ9j+qsM7ivOpP0ozGX7Qatx38xXcZ2aiNeb6VkqDCK39G5ZBW80Ig7PeHt5I/yenatOLV

FNHFgmF5PRWCbIpFhEiMUbw5wHE5odif3QpyBUY3C7k8DuYXz9WjDdj/fOyJ6b3hxdFa4Bzuoe7BW7Kq

CKbjEO6QN1EkblHBRZUrh4dji0MlALhUqJ4tRtUoph
y950OSfHSxbDNWTU2gfsw7N/YRu7DhxmUDTjkGBSx40mVCrRpB7I8ZGqcqOgoV9uWEwkGcC8C3/XcYV0

8mrClQ9r5K4udd7tKCESVO8oMDH6rAzwZDZ8K+H6/leeQXqLuKlRb3YysEsWTYD6Em+GiD0GfGIRotZw

nH2FqBquvKr6SO2EanWLdTtCm3TAYSovYcqiKGJMq0
6WFNWIwc+haOvzKcohLo3tXlXVWb9iSdmO9c4pQd1N6i0Lc8lmtEB+uM2pghl6Iy0xya1Lx5bYUAQ/bu

4t54Y2Z6EyarOwc/IcWaMPXBGlbhMJIYRPW4nKSFHv2QcR2ygECzrja5HNO31h4Hud/dlv02DC7Jwrsl

cIa0jTnx8FEWK9l1s7TFFAHI+ZwX8hzWLvLWr+5k4k
R8KHeM3elaShkKNs4ptJXPvrcrSVzZFuG5U3+3bVhD2xgYpyC5QTa4h6E9BIK4zzN0ZM1/RknVEoOdPJ

bvIcViM1FwTY8NwbWaPRJSULnkRVyhlpZGjwLZ7in7Qm8On/EG025MynY8wLrZjy3aBi0XKoTkBp07M5

U77wWywG0eMj7gwXuiY0VsZ60dWe68qj7ub5pPfa6z
zfSkPWqMiMdYnv7v6NGM+asnMwvaREQor3FrLKxNccBKEVZOadeF9PG+nDnzaLKNreRypQJ/ka/+uEJ3

mrHs3DwyLnp3apA1pzVjkA3oKB3m4Gfbyaa3vfMz/GENct7RPI5hUhWkbbCK0wee4mR1s9Ztb7ojuqJ4

ooO88Wo5QGJp0tg6cAwE0SfYlWlROoNH4r8CnyIel/
aNuoG9xaXlQYIYSrDk2b9FsPVtXkT0rloXGbnRXbk9SCyBHSFt/GJTDGPX4J+mika49btsCcq481wDHx

1aKiuda+6EEGu3i9XrTUn7Ir1t/PZlNxem0FYKICpcA4ZtE0qMKUEiy5SRHn3u73GF0wb4bVjH47hjxv

Axdz41wEgc6bg0hPvUrws7IqTUg1EB4zrTgV3dTEtj
+DwhaCR4ApFeHnBUf62rI7tm+pp2Ar/QeoAyd6/Qdv026IuebGQ/4PIIRKsiy4XyCmcT59fCHs63LM+m

aOUNBQjMXCNgG+DOfZuhDDJog9liNODybnH46ln6ghQkjqwhxDMRz9niZ1Tn/azhd9mL9dlF2VcMxhuB

5aVo0sl/Ao7HY+LCQYcGX4k9q3jWGcKfO+7Xm0IxuN
smZCcm7Q6ZuPhDXGddqX/v+cfjYz6gmqU9IRSp4BSGuoly2t3kX1bL1UE/X2O/r981cB9E7arJhaHXHA

fm/dVfdbNvRNV/rkyvE6Qi0JR74uJvC6dTRPkIvtV3igj0S8aLi9DarvcjVJcgc2V68oumcS75hvM7lv

0bk7m7NnS3liKlPo4XrAN7s6UUcisa+DzhucTDw2Qo
qBhZWXPuKLqwYG7WibN02anS6XvDGM7v6FIKx0JFaWi/v84M90Hr4BtFvQ1d+QlyE+a6vGb5CYN9Sro3

rIAO7d/mL0MAKnby9ksCDV6F5KyR2zfSNejZx4TW1lbQGsa+E5Sz1RmundPaKYo4yc4Nh+vdJWCTm9x8

CqqbH1bwIyb2DeBisphgNHzyfpeyT0KWKcIF3YhU8F
UDvVUKWKQCv36R5Bqf27TfyJDKRLvAA9BPAfL+iy85TEqyb9yIa0mRkr2UN38SLlvKCSw1pLcBxGYlnp

bMI2rtU0mN+91P6go3faqOKThfovtFBm/5+E/tV0paM4eHjj2n+A2X75LfuWvZWMrW2XK8WhlWvRz4+W

Yl7lVdzoaYvNNksce0crlW3bXLrziDBs1xi9plksbk
KyuWpKOmOeUdeZtoe73SejavUog8idu99dmRKcO9p67jP+Meg8eYpXFOio7JkKVgF8B+NNEMC4CydyX1

9dgudxQivhx0zzEzLO+zP+lvI0/dlWrJFPZH+HP2MEnuFyRNXF0Gq7LuhEzq/6wIyPp0MiQAclvNtAO1

04lg3cKSd2sUnr8cpAmCVc2gOinnD2ln93dAayQb7i
4hKbicH1dJ5gXlNmojyxkN0p3SL7Y93PB8kGG/pniWITZvrtfgkfXe0tXNLN7T99bWmIKR2s6ZIDoIlb

qndLk1TOixOIylC274+wvaCUdcaWGxcwiVhHAViNqPn+vD8g6BnQmIVBkqwCpnrHAXQ1gWM997uZ7ob7

dIXNthijfVLRuZMb1VQM7VduvdqrVN8gGhxOsy+ZL2
FVgnXoYmC4L09k6lHWGxt1SJKkfrYoRt4ODyVvCGr1Ot3iLp58RylJwMh2faDmxUbGVheAt/cHVNfWds

YUMV1xDNSVbKp97xJ7pTr8lp5iL76Ktk/0exfP3Hgmw0xYJayw/XkZXTYIbDTgSbt3a/u5GuvbxePWET

QncNKldDFVTR2gVsaSn2PY9cxCDzdFjv/sr4BPX+7U
btUdqBn+LqyzXuF+oT+N8i3cXP4Z5y/IO1RSTCiv4d8crox42k7m+ucGmnLVxh0Mg2FX6KOdUiure5Ka

fWXVulwoSOGDCKnS+0C3KhcIhdK0mptfz2uaoNHJrXsEJNdCmjWuoz0pE6Z9NfK4MLna+eo/SdQqz8jV

TKm2cfyy6fnzlw3k+0mjgTx/fbE9+Q9M0IvBz4qgIj
t2LD2PaNPhFltUTC28xG9bdaUI6A82YFJ2yKKMNz0SoiWSq5FcChLqeOb6+zLz2ZxVVjmVU57klhpGw6

pV+1O/11HbVgaNAuVrwrYxgqLJVGA79XkrE1ioCDLOwe5Rc9l/fPm56rOHVLYy1lsqQF8utnOaUZ49cU

gzs9P9HbcwpKLSZv6T8moAnFWRmolnT/7X2qBUGy1b
m34GLPvQh8Hx2A2Q692ABJK0JperYl7/lupkng6mNE6OCVoEQuLxT5WZx4aOxM0W+484T+thlI417poX

FyEUQYgH25esaPpscnt/lmM3GlPO9+QaWyajGIyVM6FfYdHFw451Rxbno4NnFqMHDEOuNJcXwFj/MK7d

bRvrEJSqeEDi0xyvfpB9MpCDdVxBa3zNrZxSrtyfV0
gjMc/a57BA+btjywrZ5xZJfldavzm7TDvPj+T8afRBS2JEUsf61HrpGH537XJeTH52DIgfr+85aYJfYN

wbZNpVhgLMNjlTeOetrJ/ww1o7EldOwi6bQ76vRnDNkYBYQpqcuBcmFFq/e7jrT3PM4Ews9FEEyeHRjG

SVqFBHbOXC3+HhpglNmdVp5hIayY/XbnM0EPi0pe6+
bMQwYLRiAH+YrIJEfCDHCGlHZnbA5Otanjd5wIqTWVqMxVTcl7NrvgQuB3p0LC+0cc/nFaAXkje5862Z

8HU2a+Ri+jZw8yg8fOelhNfle2JqryaM2WFatb79sBSpE6gMn0e6cCDXPISc08IunAV0Uyq+Si6ovVEJ

bhyCDnZanFGTCrANrSnA7FWb8vuF2LvE5+yadZdiY8
9PY+uGVu8xoNDyBdbzQdpNPDWrsBNr7zY7G8+KYFlM89Sb/oWw8cH0lliciinhG8Y52SxAGG2h7FqlZ+

YvKJNZDH05abSgWPsQT7wfJ+WBN8Fbe/6ndus73mkPbR5Z8lQ3v5Rv3B3T4lbQZhOhe3zKfSLu7xnSuC

68iDtM5KJcbjP4jYajqSPY2/0VrdDTm4kYY3mT0UcF
n9U44cjEnlRrrgJLlDpG7CxHolMv/kNnYA+OSI+LEPGMOz8OQnZVklpmBE1Di7eMPKmIi8eOz418Esxy

ueBO+FovzQ0z+7Cgvir6EZHwSUTdGb01PrvAArYBHcywWod7T/1LgKHrn+LaGHKgnKAFhwuQcAyiu/33

QPrPERuz0eLZV+7JuuYvGrm/p6n1macCceDhls9egs
Kj6y8zNAAowszwo7vbq97gtfL6wTAFCR1rHBoktS1JEk3zxS7i+SpB4V+JfyoAs7mtMXuDqejuHciCTg

IGyuTWrMIKcU9tgp83p4jTeleNvCSfV3v0ffWQT4aUz+4z53MDnDK5fSYD/KhGcm0vsjs4vk8l7BUul4

JvKRruaLgnMo6oEpBavaW/dv9YVVE5bqZNFXwb4JFB
XQKnRzhF46yX7oosxeM/gTwHKXJtnqFqzsKxOd3AJAsK66laLQntXDKV2n0N9+EnfWYoYPjTQVwLyTmY

TjzXS9lhK2uZ5YdKxj16l15QI1wtwBkwqerfPqKR/+oZ7FAvObaYTqcKyMYrqyR9865qgzRqa4l7ODwo

WP+6VJ9miw+8/cv3sSqY9v+GB0mh9P8If/GfrdA29C
hdiR6U0bBc/DqETa7QQWq6o+q0uX5pGun6FTsoF6j2XGMplzN2XCCtuS6CqqsOqi6uHzC25VS3TFq48g

KGqH+6PlvSrxrZVEH0Wy94E7WY2HDHosQp9ta7dVcrPL2+FYK1GZjmUURy10JfPdW/P22q9Jx/DZ3HzH

bkmIsiyE2eqarutVtLYrpPeEG8g40vmuuFF820IUHJ
Ph/qRvb96ytuo6teg7kVitS71WCrRo1dcnPZsr/SKbKPG/Ok14HTRU6ge3VlvLP5vemZ81nbr4YR48jo

l+5/xRTuMD3MjkRMQmb/wIxjZRrjqQTMcoADSil825YvzF3b0uhzi0DyBivMGMNQhTBn/eqRnmY7LF3s

3WEhAQGpF48l7ZR12El3G8LEG/a0dtfSwI7zkwZ+5F
TWJLg3zjtZN5HrCIadCoY/bJF7KF6GDqRS02JOjvv/34CqX+VJpsb+k1q/wvcS1419gOLe+LcBBNnDDw

naAR3aE7QewLWhba7wYBYWu8+NHBSHj48kUxQz3hKLocUQ+ErZtCD7SZp3SW6shGCzCrHThc1GwO+kQC

J292/hyuWDDJt4xmXr3s1uBPQ7RFGt1LtJey0vgzWM
aDfj4JXmdh4Ew0AGWcgCOcDvwEEYwk3Gf7CpltCohSFElA8X96jB9m5NlIdTvyZE0za4fg54hre+PNPR

BOmeSwTUB6PeYt2EwbREMIj4wWYfpm3Qz+k4J6gmTS1/k+rbMUIZ5h7gsvSdu5x6YeU63HNFCoIbps0A

NgN5Fe0UfEF9uHV278u2nerFpSQeZFv2o4eEaSOIbz
sr3J56C/2DoxWg8CWxH7xIN2U5C2s/X8jouNUzjds8IrhwhoSUpfIIHn/aq/x0rXjU+hWcht75eJK2xv

X4Q1W+pnMtqpDtDXUN0g2yFxcJ9S1sfuQPdmZtTkDw1Kym9vvmuOYBO5XwptEcm6uc6kfFQ0cdXeOYLa

4Pd/FLZyKMWD+YJelASOLMXXaWrAWqtroKo2LRToUa
udvfHRbn7sVO/z5Cl4ouzej+nkXKFM13ZxO/V3hxZkZfKRk7UtjSMvxVwW1Ovs2fCXYzdav6/sbL3m7K

oXZ16lLvff37wOGn/wTUSMEN8s30YaEjPFYxsan0GnICni9W4nJvWuleGYG+HjV89DKflLi73UtWfKUM

sB0DTMfLXdh0JmPo6jAdctwz4CXZ5Ca54d4aUEsotj
htDapLAzK+WZ/gCTE//HY38dJBbHcOrn5aZv7lMDPnbMm2h/usEHuwsgtJqu275Z2NkOMfn/O4TA37Ml

lPdVSKF1dkzjZI2fbjh8A6rPs/F+zZcBKYscZ5q8/swRuEj52Zidy1S71N42FHvfK/aPBrzcbjwvFYTF

5rYkolrgEzOGlQgI7+uL2WL1I+yxlzM+C9HuwRSwWh
uByaWS15veTflnJfiXm32uqpzlL7L8HvZ9yOFmF5icrmnWCUcWetQeyUKDDO9E6FjO0EKIBFjmon5b73

DdLWV+GiWhL7nBMq23ozDHi0bF08cpCCw61+mbYWxHZtw5BT5joD6aoBQRgF6LyVb8uBt03raZVBpzRd

SktaxBh7e8ZsatYUkfB1orsAPDJroM7QD+PefJ0jo3
91x6IPXwvpcnE4c1Ep7FpzPWJpjLpjt8EIATf8LyLs7/ADLkGSnHlZoTpNvM1sQ3VW0I5hmUrF18CE2s

EkfwwcdUA5c2NmE6gdKdHvkJzH6ID+vMq8b2+KyjVlMv38Y4e0/od3JtjvXi4qlrZPuLeOOB1kEDxivT

DCRXl5wkKrordmD6o1TEy7YMgolSf8ZJmxXzH5uq9M
fGTX1F69K0UwN6tcl4mwZXh1PAzRwK3UZD3IECe/Jab1h+lr1k22SE8HJotx78e8YJQaBd4DU6d0DiKG

d2qjPJ8q58mBSS1k/ult9YJSY19XmraIXMZAUhVG7q0rS9k2bJJ3v88cPRI1yab3Ued3JJwftWTo7nNq

YrP+5A/F8PPggjcpCMkTUGQ11Vpz+uW+CW7u6Swunt
Vv8WdY05oA2C5+QDzm73puLAVJfUUEqZ3U0KqIf4GrYrTQGN82j6SIOW8U/mBCsTU4ytVKrxFuUTK8Hu

fImtWnOR8lUpGmOQ2WTRGqw1AYm8NnZL0YohKxyF/iLUsnFKT3VLtTIqgEcVH2uwUPy0l/pVXvlDbiVK

hUx3M7D00Z27yfSijkwUCvTQWaJlDEBpU6334w1ih9
09mCj251/e48We4FSL36edPhxATebgjqS6hiIScuX/Marco+ct2GICwAJcJtdW4jHXnwcBeW5rCIsXPN2O

mT3AxI5co7K0wTZ1uvIj9kIanGNhwR+ehogZBrto18Ckvmljrw63USGax/iB44iKbNOtCZQSd0XHblPu

W3lxT7bPRJ/7yZ2WlV3K7FcTDdZI4i0DupJeyhZmN+
rMQab7PDqy7/iVawPXOOBJEMUbWhendfkXfAK3LzUvpSdA6Zu+a+hUm0y8bwbQpT6kN4489X1ZwvA8rN

lupk2QqyI549mL3rHlVb3bhszuO7AR1DVHLvM/yIQ05SbemnJ81l88ApIiw32a36hJ5Y+Bcfls1MlscB

LU6FO+dgWV8jK1gXHMzFGF0RDiKWydGLd8mmaS6DZE
EeYej57LkCi5az1EgjPmddS+9yXZehBZp5ESQZ27aHm53PZsvi1uWfEO0F92fcj4bN6kvlLRjWv3Txhb

TJx/Kwl5DDwYafbejboAj05pPNnaOCtcUmS9i4LddCHs4RjpBjfaKfZhcVihQ4vV9q47XDnqZtp9idXY

xk7zm7o3SeAIQxY1PwEuGjjN4RJrrWyu8ZO37G94D7
GDlBrx0dYtfsN82mjKppgvTaTP8pGQhprwkymrpRM/BfjKlVfSzau/Zh2AZkaEyzIwe3ZaF7igWdJhqa

Q4TVqG5HhtA34TOvuycid99hqK+bGMEGKRjHS8CPFi0e0K9tVp4PqXP4VVhGiRuvOmUX/U3Uu7uiSzHx

rtmSQsuS2/4e0h68ypj6M3KnFY01ebXVrKoJSXxjpw
33+o/TcCN378To4elw7Ko6DAOm2SwAiVZTeSkxSuKj7WbXJLFoSLR8u/oSqNYtHT1SGvuyKOwOOJt1hK

rG9y2jp0XDlGgaWEwQkqrdV4V/QRnYxxkKDZ5ooo37fN2TCAxBtUt40iL1enTmOoq6cG34lHAGmSeBwf

QXGU5xhOvK5gA45JpVyPhjq794cJNmK73kfZSXcX7u
5HpCiyLL0H4kEAiEopmHX0z5w3B6ZLh8xamg0c6SykjFUhdpktk/MXkssqlASWj3ug69OXrKF3HB+Ya/

RJtWjd6sNdPT2dYQ46OQ19fazDFjqJxz+gHjPRTM0TRH/vi+jBm3u03RpeLrsruy/xPTlU5YjVplFkIV

54lTFvkTwzjeZ3hy0oXhaTjeGrN4+NApLfg8DinKd9
rH68sTXvfY9Rqi3wR+4ObO5R/yLXcGgD7Tr2Yi7tlkEyLnVUdDSCa8gxLj9vIh0Fs1GQL2t/xPcY023W

xXi9cIhKZTIzZ1Z9IThN+eoz1SKGv+/zlL4C7cYctjdDjhTXuSRq3++ZEuDtsCD2Dq0zqvUNKx55XJBX

itOulonxehNckwBjZuCUtXpSMQC//jo8rtYjT30FbB
00uyMUON4SulyLhMPNR6rqw4n8Eo9U2N4qInG/TM5Fuv/EkxzHCmi41m+yKONLo4s0yFGoUi1RViVkhH

mt5dhnnoayBJ7eXrYqFFZXw5Rnk8jHNosv1JUfsp4E48uJPwvBtrWy6wiK07uLi5odhJ4Jeq52VJq/WI

nUkEZICh7kJZjDHu81+R4XBCx7VRSbw+4IDKh/hIaG
PBMQpDKzel8HWONzLJp2yLwZa74vjLV8tV0C1hA1uSkBl8q/o9xBu+u/tOykYq80qp+yWn7qVvHfj7cz

utOuW+lK1ueYKG/uPw8+QEZO8hFa8EbI39QwyrvjgtWvT8WM//DXBiEcbKAwHji48D/R2MSg87yM73l2

HxGXgw9+xt7XBFV459IRECBAJtxOIixNvDUDxYzDBa
l4jif6CsiBbNsX3Lw5P+SbwmJeILYXdDM315u2vHzm+yj2C0eBfcnbh7VikeBKCu4PBu1DiSKgEjU0nh

P+Ytx6h2j9TwZpabI4fuwrEOaRR7z45bdNIvdi0ALGeT7c1eVNKijR1XY5EE7g1E4ERkrivgXfT5oYlr

pirKjAfprUv7iYa2G2v+5Acpxq0b/ytt6azJ/SEnEV
3hN8SZilk8BD6m+NWcbLs5yfTRlIzvE0usH07WJwO32JzGlzOWwVUIdB+dkWefeLKSv5Br5n1hC/1/kJ

Ek/DiHGj2a25V13sdgC8KtjqT0wkwFKQpAQ7zjTBjA4WXkjGbubU4PGNcMdsTpYv9NHU4BPvVR4p+4O8

Ve2goPGTYn1p+ioZVurJuQohQrmQWOHpDDuCC90khF
V1KPUzRycVTAp5ROkYdv9YtiiUS5uzCOrw3W9cKLrRRWqRu7yuDVtb+J3i67/HONiCZrOiXFSlc+uUmi

Y/C/LrBU4p64CQXfTT/lLI2NjmVePMi2ovlvfYXd8H1hezT5w4uvEnFWxhafktdZgaW3+LvbSEDmKdaC

xtAYKgTacwggiskVWkBinGMC4SClSnAShxS3h8IFY+
GpKIEQhqxwl6jrE01/8CRj+lX9F+AwIPgkh30DB+Zwp8kI8YVoCS8IGeGRslU6MUz5O5WWUT9mNEWXPI

HjUUePy6Uuo41BhD40VAAWD/NTmOZov4Q5JpUeWDhgC4ldP3Xgz/BXvNnFeD4V+PZPfIig2fsRUx6xxY

08rIB6wgA+sJDddY6NOFEUNUmEo3RWufJI9VE8fTP/
Xw48/11D6nqTbnyUZgMfaoU1kTHX62FGoBBFCWWJIp1z3RvUv6E5/mqzyyK12kI+pjnfAZRKHRHWu0JY

AxVKzQDJW2hMe0Pk3jzREaPmcKHO/PMFb398hGhvymCPPxdoZ1xiYPovl8q0M6PEcQH3YUoMhCKZJr71

zdpAkQUEri9oEsZwfnLejHebF/wKtYYAv2sX+1ht48
WWdD9dQA31MQ5Er+ClvoLJlbBY6yp95jHSNssZTx0N1splTyhm+s241grL6+l1m3i7HloNEt4+ayPA7B

rhJQkP4zH37sCk38Db7RePp6VraU99+4ITbymUcngTKdJCcV54SP1rwJ3GQTPL94v7SCmOK06HJ7BIKN

BBmau/AdBvAtShjqSgWf3zmHOScz7Tc33xYt14A+9f
80fevDCek1s5XJNyj9mLiBy16z2mQIz/4dZRMIoSIcnEPP3TnCFN72sUaVUXQdJ5usDRGc8ZCGpFRVBF

UFOnHo43izFjGLtyxlvdTIG4T8qUw6iHchRepq4QZDKJTxzCgp6ZsO8/BpsnKax71+om+HtKkiyEE4Ke

IMTskEr9GyMJsvaOjD/pr84tLSi7Kpx6f/nhJKGyph
4spvrXQ7xzg+MYU6bZHdgy1cj+KRIBOfFOe7oCc0WQhLSark2Vjvd0SGCC2KXSQlXBtAIjZXbGS3Aiy0

fHEQWj71hmsEe8c7EF/mgZrxbMC41bFZSktM39sQUAtvN4+ibYMua1x/5l/epdaoxPGmWzr0RFVz7q3g

Edgardo+/isrbRpmFLyRoMqzfQ0jE0FD1gMFCcBUFXIrGz
zrOh+ds5mD3oij/I4Sh5FhjvJKGBu0+xkkCvSlctY5wnftvZQRWXTUG+NeHFIkmfP646LzSy+7miO3QE

8HwX+WvDcluK5nXcYnOKWj4iLHax5zWGuKqBsfDBoQi+FL/eyqYwaRy4cuyfFqhYscq9FBVUrdLKUPbm

OTFKFU/s7T9WFR+If07xZNS3EzCQHkKBP+UbfoqCrc
NHT9fZaMbS+/Yyxvz/fpRbk37WR06nvhnbcT3jHbdprtTw4oP/4gPVEsxDvCqawCrcUVK95M+1RsADfO

UUkHV3Gony6WQUXqchGuUAzr1VQY77ixXi1r68MEhLNI7gvUimW0GN5k289W6kpCtyTsaJPiy+y1kTa7

HXJYMWKXj8N71hLz0UlLP0AZK8q0pJMPRdCMQKuep5
7Bc7hOAzyKRTUrZYfzCUFYghLsu5S5CCzE6rij6Yx8ZgHVTjtqH5LCpv3fhEzJ52X2ZXJaMCgNmi5WII

hYV64eGwCVcfw5leVVxalW2j2PWcfuQJEeyJpfjvo3uGr/7l3ExnvqSqVTCbXyPGxZj0YwLitUSTvd2W

x773UDqG/Bp4rQwTqz5oTqKW6jyroE7aDh2s3y1hIK
qoe/ArPVljqCjBWVMqxJCDTWxcrpAZMbguo1nSBXYgOlcZj6dfl7KqZ67heeXdFryWNe7/zVTNxZxzNV

YprvunrDl3eqrwi1n+td+9Xu/D2YymUFXA22QfrBrF42OYPObacxerYQCre6yB9BaQkSUkC/No4m+0Iz

XirVwoRij9HepsD4K1h3TJ/zK8F/Gzoz0uD9dJZBH6
c2iN6AShPjFrDhC3jPkwzw16bw4UCOSHfHS6JvMGvEAiRJ9AkZwbyafJ8E9u/hMPDvuRBCyvP4Pgk+IV

v0t89Nq/GkprHJePy9xaptZuZnRAIsJbpdvUhxxVJN0dEq4oWknFu6PTU1vMf+4nyMYwrpQrN6QFRga/

sQsTpo+9xrjksCUI/3gIavs69W53jgrPO6Z8cVBiTm
fs0mfmYDHU0KpZ3yDHK1puq3DZmuj/6Mu9S5PLoTsOY6msAN795IFTWGcJ1pKXCO660no+sBTdq7DRQk

+vStcwsIwhV0T1JBYoE7jIOJinzGsUkbOlCGtxPgRCmqqTcTSmjN4uPa+jypJ+bHcwev0mVg2A82fQUI

vAUkLgPejfEXU52KpfmmGRErRK2PGEHW0Z9Fv7eQte
11o3HjJ3O3jAegishY/KdjZVS/HPFf/ZnTI2PxvOECINrI0J+lH9pABvsvh/X2eM3/rcO/pDSPmDFkfb

PVNBbee6pJPPq+VOMvnB2fmP2yRljezwtCWVJPA7SbUf0Wx44sMyLa/wEWnuU8MIPcURI1pzwG9XgyyT

iXRY+x/8ajp6Snf+TdPTap155j6KfHuPhm/Da5M/u3
N9nRm+M0L9rx7gAbN2N5QGxv1i9MQv/V8ltnPHn5zAILE5oa681d4UFtfz39iMkZLZfVwp8DEEV7a8Xt

1E9AmS73Kr0Oz4RqkMGzrWj0RVAkYZPGVjEX/VqY2guAtTHbp/p0dvV4/q5BTitAOObl/HXOkPffhvCN

KVk8h1Jw/zFTX7960+tl5+GV+CHmEtLmB+CueNRcaw
0svXzma+KdvhqNh0ijiwBmsCSf3zbXjNLrbRJITNv3gF9INUC8hSrLbiS5X37uJ30epbHVeFmh0Lb4WL

K4fqMbKVClolBtOYGcVA4FOJ9vp25zpC/d65rvIHmxaLfNwWLQUeX6tUnuW6LSQsjqutMUZBvEH4aq7k

ie5G5VdxEOAq34ouF8nKhlqncIP5AnYwFgEnJiq1mj
YG14AU16nEfK85veLGQ7+7W8s/sco++TvhvpUj63YflvCZV6gNpy/KSfQLrecu1XOnP88O/Kvm2t6ba4

9QNTxcAeiHxeNMD3ORRHLQz0DZ4jTNo9rysgjGwFdMHSb4GC31hztOk42OX551qMI/G6+ioHTm3btIeu

m+gRkAygOU1jrQ7YadKgobLhisdDZQS2wCptOM+RPz
3DZUKFRuMWXW36PWaH/pwhguOH3oImt5mxkraaigGL2xD7l+W4ePJHwLeC0kvLzytwRpQ8ipJdqhOXF+

+FRK7jNQFKroaEeRpXwJcWKc1sjagGXmwJf/RWzgcI2n71D0tSw0l2aV9i9yluK3a4KWeC/AldvoErmB

wj2iA27QZP8cXCwP3lRqzaAl9nNKproKwlNU8Lk0dD
B4KTxJSGHj4BEvL2UTGt7Hi0KsbLL7NVq75LADRXT/2xYsjnZvVx/O1VZxThYEFqt54LXOSPnbwhvzGc

3oT8cgVxugDiItyEyEu0Ximo3hX7DkxWKHiUCRrqinX/RqZ8yZtP86fQxGsEtQGdXgm2x7tBYQk6JayU

ddwkfd1/r/nlAh8fiJ1jXzxq26Oh1aZSQbe/hyOR/h
7pzv8JsRUg18uuTyHqMleGhOJAjBs+PS4Y0SX66QbzDuOgs07K6VmXX9U4QVRWXWhqH2hs4vnigwUatU

5/ERoStHlDhbnt+v7FNVtsMBpD4N6aQKjz77IIemQ76aQ6nYcxi5W3eVbmJbzcpEOhI5a/5iNkhp2Iw4

Q09oMjCO0hphKVejH/Z5d9D7YQJkqP6t+6gR4QVxHC
uj+QYuHzOljBGETWgZY9WWdVyb/EzXGhPptcfjAYfrnsGGREHh3BLw1JCKVhnUHAH5k0bKw+HvK7ZUYq

CukfLDWWdzPfavreIKe4BxQjioJk97WIqkCJvXlwJe3sneejp95OwlrrWDW9wvWQX7ovXbHWBk5knlbz

cBMSKZL4CYoHqrgJfBiUZkoHPOUKtxUmf9aLLlbzmi
M9h3oTNMJ8LHUlCh/TLpJh9bmas56hDwXoDOLdKO3Q1sX5UIcL2nk8ij5EI5aBT0AmE/G9IJwN2LOlYc

ykcC9pwAPGrkD6ig6tKBvgg3/9fL30uprPssq37rXm8i7sRTkehlYMKMkhNasp7azwq3mxt2y/C3Wn6+

GXyHN5AhDkWo53svALz38qeIOqQctSScPkozwJfZnR
5tWAdLlO3seLno8DAj2mauDlZjXxZ6zj3GTBScPVkFysAZ6hmkxY+03ANi/AfBu6UAmp9S0wcZ6DQXpq

hJtM8EP047teI4YjEbDLdg5LgybjMtooJ18UdRx+xJcD9bpDliquSQU8PhzRfr2FTnrsr46SHp3I1y1x

8o9pCJeT/Hb9vrUr3XMdQhKkV9mc482aRc0n44Ji8W
8KFgpEla8p+VhxpSCiMaFUaEB069/RC0XMDIatLR8txtPEcy6AwPke6YPrvfX2wHo/APBfnCWcPVgjQH

tXkQzQBJRPmyjNIgkdK45b1ua9lKf409nmxUTix0GGUahO18g1rqxCCziNO5mogLxNwVeo4BHHZZpxDR

4sS7YK1m0cS3rmUyydA3PVPLN8J+5lWWrWzFxjkSLh
jXTfsm9xWhQbKBrNV3ev8gFCBqULB3sU5/SrxCEqgR/rMdLu8cnIvNUUCDCJERpSZuzSzt7Hfk0HmkAH

rCKar9GQGxz99gQLMV8dP3EeFWwavQMIwQwGKWNagCQy68keAv8Ey+0M5DJ0lg9L+ACxtneoNc2jxaul

9w3+AvghTHg4O5lAVBT5L/9u/ugRgz92wTzAdE7kTu
6uoEZeRt678AoM6YSW+Gx2ARjjv4scjPDS3+EISRv3NYGtsAGqNpPWDB2YDKBHCbitmw+Sqxn/YxFL98

2XjPIDHcK/JY8T0LYTGUy2O9vFqIL76lY8ETUaggWsU0khs3QOUJYAV1Td3ETKl6fhnsfXq6lh1f89H+

Oz9uUu7c20qofF9rL6zLQJNYKjuGE0ACtWPEyP+BETO
TPWpUYvGOHdqCVb3ca38hZEvj1PmgKfqp0IHd98Fkxem//ovJqOFZQy2YTi/5PmtpGDiYX9lC/D3Iktg

rWjBsSxSIJVAfhQ9t6GsyAOFoL+Z3lqmO6x0arGDzdxPbYMwzLGaLnn0VF3enX0gHsjzSisb35vR7Hbt

K+KLcJEJ3Tf+YWelfVLbybqfBUmKoEajFJW/BFWQ7h
KO/jhykRbOKGUr2s8y/I60EvCMsOyVElIQ9kdmigQ6aB6Ld8V9WRL43uznnWLkOat9cM92ICpmGidv3F

ytgSjdxEO6XMGFWTuEtworzSNbbPaOT7ECyYpLIUYpbcUnCEumR/EuNGR7TTiD/sOWluAcwHTnae1QYN

5o1OiBL+8QNmJ5mwzUy4xLlSj+/4IUcaqjJVSPfBPH
uiWGdAUaSp8c3mjK6uYv5vnEsmTKks4hfO41Un/mFxNElcfZmOMnY0F/JxgVxNCzfihdifqiL0/VlGK0

fr+OwYyGVFYM9RdwoagXvSrSh1qYp5JlkpRO8ZeEg+Sh5R6FhTxWHz9TKMZC73vJj42X7npO8J4yIlgK

mALbrEYbxkazByko+57rEWO8f4sV8t6qDXM//npTPa
IlCsDhB/Zei9mkYGkM0k7QPWCDrioI+1bCLF9nqeIX3zxUroU19G7SmT2HFxHEUgdakdmkfczK+45Fle

jQ58hksPtmT1vUQwdI4XGxZ7WHxykXzicWq47alZvOdXJpBvZ2IaT/DU4QBFnaqaMgFcq1JQp94FIlWp

ZOK04IwzCdX/UxXkFyEJvrm8dYvKTgdM+1vUQ+5egM
h7P/enbHLqsAfyElPRTVc0WcGfnWrIppLd0GSIoJAla8GHry9I+jjVbH8CO/zLiRIxlBZYsSJh+23JY+

uPL6n1W4EW8fSe1X2l7dNpiXdaHjqhFpp4D7KTWIAO4F7td0L8kniK5xbj+db1Ze1h9UKdN3iozz/N2w

7E/SOCzI48B6Udi05DSGizDcyX12U/bTWMMKZ2hCsZ
Vdbldr1Y3cW33s/oafvQhBicVJGc2tqiK+Bzca266FjeLU4QlFSGmrCB77vlfV/DFAJs4YD/mf50c6AT

Fsf1o1rribr0Qo0tVmGQLB0ExsnORF4jNrcv0x1zin4E7Wwuogi24zt1oNoS2hCjQ417J8Zw5pNfsJze

JOho/oS+h51z86RjZurzvDt+PZMKRtY6kAgtrNsQ49
TsHhBH+SpBPtk3ME/6Y10niipmAs5M5amK9CX8CVmDnDf4ts8sx5hqqlYAGT10IUhtQuVT9wtVy8jCOf

v44peK/zIjz938EkC4vhhF36y5aPPxVWqwboIkEkDxdDqkGhBc+c5VNvKVsP6+zre5gPAiFjG0+D3g4H

YfNxpE31kx8U/hPg3/ykXtW75g5CMaEz02wGAQfBDE
jqG0vO1xwV0ScraO7LF2oFJ8wsTDyQm92iiJXpRruyPHqDCR6GpaOS7T2iY+JVcHX1GPlqzCL2YCeAj1

DAHyfkqb+mooA3WzDghGgtoiGGvZAimKi8tUiq6aUVZXiSlik/Osxj/id6fq8kyBC9pmRKJJdznotZL3

c2CWraXNwGicfhkUpmuEXg5kgjMEdo43sF2G91wCcw
+gfkHzIfkXqQwPyETG3FDcouDdfsGQlgdx0D+RiP87vx87fn3N4jzhpam2GUGaxR07mdiwyfWpfzmizM

4aytmU+YtrJfpiR9aKu4aNl3UOpb8F9HoNTjv5mgBVmrIyYbaeZZLU9ySE3sYCl02wTyDe6UXse5Xeas

Sdc95NqD3vF1tMywY0g84JhpC+5bFUvr1zNAd3EyKK
JZX38NNASE22Y721I+LNoerdA9Tmph2TyznFzJIWa16gU8TnGbgPaG5ayFaqXWa1t7RGB+Pj4DYDIv6w

x+20F/0F9Osqw9It0OwXm6GjtfM0rhj+fbCrqKuDwLQ1OSFqafwCvE/yjQBfScM89SlvH3YD9qOH11cK

h2L6D05AhCF8t1iTwe7BYZiicDbJHc5NgCCHoEEvyw
XML1UkQvLItZ5XgP8G0pBvXEvYXCpCaFBT6NvQPGR9EIeAUZE1aJQAs6WCS9lWxJd/T2RdQLhy8RruQV

3r+zq+pDHs2cxjUhkyiV4hNWxY39ayJ51nWsxmASSzweR4gzMkzKyDiPCMHL7cAJmkrwQBOzkYzB50uU

EJ229XdXFK83EfH88MASwKG883jDqC0gx/ev3OFQD7
4PTasf0xwmBvaA7vGFdG/qqmGIPNl5Y8ja5hj+8BraBmWE8WQPToIeNRFbaM++GpxyfFrtN8CBXj0Ukn

zehE5urdGuH1KDeeNik3opvuP3yqZVQzzfRTbyPS7QX0s6wiISB8tE//Iz0Gh6HciCTxjeB1/c5O5Sks

sKRLrq/NDFqQ1zaquQstgCOiuCCsZrDWYeSHDw1I4v
kXcPI50bRQ3yOlpEwiaMIUZ/rIP7uVwtStqXNC32Lx7Rhc18K7uC8nHbNb6vxxWKaRusx1+HXL4hgjNY

6R0qGLlp2Lv9c1g60JtA0cLMrnL+sleFLS2qX9QH+7Ynh1XxbOBqjzB8wYyUCiPWJgzBoOHgfA2Vk3pw

v1WOO1nKgO7lZd+pv7E+UeC1buSP5t6JzULUWEIx+U
CUXRuaAAT4lRPAvivt89xcI4Lc+VIiSHGIPxPE9Ln3WYcn6kDbCi/OrwyfLDBVIF+c+TzxmaPrLzzqVl

AkStOoQtwX64qfq78J/Kkyv91oRT4IFdAiUirjVTwBBvtswVXt6Pd+4aEHac7YD3nyrI0EQ5iWzt5WcP

7TiQE7n5QdsyKWzP8SfHfO7gRDsK2C/qD/TopQEm4r
MX4VQhGOymIlO6hXAVl6Xapomdwc2i7pTNGdQa4Pd4MF+8xWaL1eSCAs1t5Gndm4duJLD1GZHdWSwYOu

Q+J0teaYn61RLBL64uVfV59fkOx8kmpUf4Z/YA4YldhEJiWXjfC3OO9YXwsxm7hPRAz/wgAHGI1q/OM0

OimKDdAvJIYn1uAQwg9rvpNVnwXFn0tihVNd+Z/6zs
EzYxDJZ4ajGfChApTWcyxs2M/j62+Ki4c4/gWu19H5xhYj3HBNodsV8oZm/Rx/a2igYyToWcYSEC3f4e

r3o6I6A6yHvlQVA35ArhhFy7oRphX580gWUBiPaotZgg32MS8+rI7bXuiz/DkFxcg65jLzpZc8WsbhUa

R3o6uLiyYgyPPboJsgMBEM2iAGJ41bq1jOdwLW8v61
3qmvLN+Z0e6i5Oo7BTvmRGeP40x2YJVF0vfXLD1vLsc6HwmGPP8QIn1wAA2ED+yg2phMnVfXjNodjh+6

wr9/WzomvA+s+yvwwmE5fChKaSPDIes22MPYj2O0SmiR5CuElcQAG0n/JBZAPi74vYkmOJq0ZKeIIkL2

vTuMRFP0+cMGLmrrPhMOG4NNMAkkXCalPL8Z/bM/Bc
btNn1zMtqOByPYfgn6F/AHfIAd0v+HqY6E62piqny8Wq+diND3in7r4IUc2DFgNCNh7n3ICcrNWE36xx

gPoxbx0/9yelDgd8asP3Zr7hJeAND6hMHwOFVbm1JfuEYT3k/jeinoVdGXx8xa561nuzE/RJaIi/5iKL

latRsUDurcsc4p/pafe5g77I8wBheHEpbVvz8kDvPX
bomUzqv+tadxutsFu20za6wi9w3vKGTlD5UWvImmYQPyLXm0ZhS88kMrVNTsj1dFBGwJNEAn5CNlrZOQ

9XbW7YN8xXe0BW8Y1Z4vWcmKQiWRvnzcV7phvNl6rjtRSLLs0D/UoPL74i9eoFOnnOhwtik48i38nyDw

NkkFrR59qCqdNViGMfhLBmucX8T6y0aR5hbp2pv23b
K8/aepz35NtshDFzip1LtqpYCkINPbJdzbxFqzVNJSahAqfx82+21l6WNzRC270JhmjuKf2e205H3sRG

Ls4ajxg1TPlgGJmHsTA8CRVEif3A+lLotaxZYYW1ac38fZONK2ZH1Y+xOLHarDo3tp7NagPvrNSsp7+E

dEFGbv1MRGEQ3PotTjBZuEdEXyrgrggZG4SS8sx/32
f7Maqz0OKAWgut0ynq4EnmTrZ4YHjU86/4uchJbjl4zeKDOZZaS76ao32jfRvAHBlg9JqLXRK0JzNop0

E4HvVpuYBZTlz1RZny880S86m3wsrbfjYakm6T3m48D/lRahSDcx1ezKLsz81lK3AwJIGb0I+Ad9l+uS

xflMnsKJRwdDGApPIixKuTqwd1jDkpFC8xdki2ZYV+
VySGicc9MKlMt9cX0R6zHsjhusFFjibkF+TPXU3F/y1A+bcyByE2SxGkybZ7bsNMS5iYeb8CjfTWu6Cw

xG5XDM3BJlwcRwhfooBDSkBDRcIkufRugfcibHr90Sx2qyg7FH0yg+6ycOGmOqtzdwg7XD056Y2UQ52+

XizGKT4l5akDS0d7EsC1U/hRQlq57A5eEjYUclf9TO
VJMlle+13gTD+iE95NVkQBinZRms8WFpLoMenYsVhMQ8u+suoJemdX84Z+WdO2GdPQPVhK45pVnUPXlt

s/ojiUkcjKpjzwjiGJ0Fokm6Qlna+sRiY/LXiSFz45qgvimB+h4lAFNhdc/+TNisIjfSSGDGrdqbHFqs

Tbg4dAHCvK6U8/2HJ4xjN/S0r9+8/zSJc+GI/3scZw
mHMjhzuxTB7Sizey52h0tPVlJBs5zSgVkkUzhq7TxrJTbf+OevcHmshuME2srDIF41N5miYr5iXL9hKe

IiIi+t2xP5nKBxrj2/+Z3EAxftzPWvFOFT9i6Ykb28xuhE34uZBMIMioQXQd553CEF4gMVSBR2Pj/CxN

W9lTvzeaETvPBKFcMXtgKA1izoVZaZx0wPLxqTgN6w
SkmNDuc5S6qMftNdoRh7jhRAcdrYi98vZ0iWLvYsARJLWwW8x6MKdx284xAKl7AtQQkEZ4bjgL3lB84T

VzXP8SBsn+kHiw93DyI+80iIAvmBgJYV8vKpelhrz0pivczAtW2mueZI9qeF62vTBpr+3Yy7Xu4/8xyG

lkxyFbR0oz0Ot1jG4Z8gL0zpQ+3nrL2MdbRczDKMqE
z+Jueia3H63c571JmWlbBQl6cQsgKy/8k9253lXhTx+0dDzHpJZ1RsfYsIMGI/ceLMaykchw3BD/+mHD

UCMpJNqveIh8o5yrIieDCeo5ycYkMoDWe4zU9iqPK9x3T3EOWDv2vak0R9AnZV4JRycqfp8zVrXSyn08

jVUX6YR6OZQTDKEsqKhHOVFe/3hdraxQYl9Zjm9Gxc
dAyzYS0Nkk/lHBD9CC9xy+x+XjRvoQePZ4VE6FhgarGsb32FuTC3skKoV0xznOqESwjFbnxxgFpTzfGX

xnLz3R1wZA8CH0R7ffABuefnmUMpAuQYG/9uYI6C75ITQV3KJykGh/I86d6XWpGKWsIRcUFlCS/UlCB+

p4eXOTL022mkOqC9553x12Gqeiwr5bzunenHfr3WXC
pAHIBU35gVwt4DQPdq8bjk91H5/vAd1tW+vt/qhWopUB3JWkNzV1lsB9ZHeBm+ezn9z8DadNTs75mirm

H4l8Rmc8KJ/D11+F3xBoIKzb1k5pO9ZlJM4WHEafe6dTXsvw4skY5ZS0joVMCws54UfxtYgy0FDvS9g0

RuA7Z6OHGLG5KqAeTrY1CuYcVrliWmaDqS5zfrutE7
/wdthb3SOabNeZWuxcpN0FA7MaKOIdQ4vWitcCVmsv9cMJSarV1AfjMDR3UqAzQ6CtocdnCF/HZIYk6+

uqJbGuv+4Th9tUPg1IvFErM5LNrnarhDuPOX/qekQijYnDlEMTCU+hakd/R1+N3mLWAiSOcbxxbzooNC

EY9foMtL3ub53DoMLEf+R2Rky+i3lzV+T4zEw9b+xj
5JNC/geun6sq861rRPdwgpIHU2/PGgivbELy1lHlQ+prOiul/nKMHRhWdwjEKUZOZtsXb68Cyt/7BPq7

hVks2Z6RVxd/1Gbu9H2aQIBv0TcYspN2UmN05Xox4JjcYIA92URLUFIgY3jv8n0zItE+ixI/wmO9qm8T

RAXgFEASNVXf/7DimHKJ12Qn0KWUo+WtWXQozTdApe
XyOL9JDyoAsAz6DAJa6NlNjsTBgnfL0xpfLCtfT8mgmlfhme3zlJRxpZnu1teeIImN5a3JbLeNlGsnoy

BSaPtYf6tTQN1DzZ6oREnwt+xx/0MA+57phZWYrdU7UjuMI8Y9E7sWvYDWQ9VuiIaX3F3Tgb1Tmy6g4+

SRr9wm2s2IgpuScEQFaODMZ85NIazFrchWXul8VROu
iSYbeg+b7J9wHjtnGMcr1tW6sDyeBdp8vgKF0Yg+Oi7qi5gokYZiJ8VFXhcblOd7pNa+wxkL+zo+Tev+

NCfN1bqijLcxTZZ534nS57zU5kilb1W5rP/YYU67GHBVNAIt8KAUAMOWl3eRRyidqNmB6fv9T/92rwmD

bASq7/kMjHrHrm+y3nLfgABPXt18jOWtHcSeB6aZeY
ZrLv80A7sT74qxhe9w4206o5ugtZ23OR2cvraFiO++B9P5rumihIt9pOkPHnyom/yskhWZoOw1t53PAj

e/Y6fiRt/KSOWT9AxMdaJ39EpaRALbmdjJlBNocNNWzmnPF4eLvVmWQc5VFBcZJMMNHChl3nUh1Xg4K4

xBWLs7KOL8U48HYxZcfedWh/RaRlgWCuRU2saqxBO1
by+owooD+VHi4UTaZrRI5ms+J6NBkt7KT7XionX5/haQZIJqu+m8yy8qg9MFMIjmFxdbLz04q4rVd+uU

zbvqQubvAfNQGBwymy4dM56M5zXxNSA+oiFxVUdbfbirS46ULNxex3a0mEhB9fCkhw596tO1N5ETEG26

3DZvpn76/2e3CAtbZ5aIDA5pAvUnOgfP/A5BICAf2g
11UIkLHVFpEtDS7OsowHn4C4yx93PXt2rcvezAZj3LGuwomxiF/+NaQgkgM133LFVFy9It3m915q00gV

dfMIln+4dMU3B3NwZsU4pJ7cDxekjRSXMWjez/aeM3p/kILqqTH4Pgg17yf/id+lGEA04HpiLrV1I2YR

KhpaByrM+DYYj0De6TGyzdqn5TFid2zSJalYsovhTi
XVt5hCuJQxjY6l5y6K7qswjpRsFv+/uifkwYYw4vNSVC9rA4A2dNd0gjIK+ZxOPlLIJIDLO7dINnDM1d

imvI49pubEVYWa0fEyM3v+sHCSJo2YGfU/ccswmqLJDjA4LeAmI119mrC5TQI6jCRDW9py1fnQTPyCpq

/8FBZ2kD/iW7rF23Tkxh0sUjw7FzgOq/QvRX3YuyKH
rXQvS7M/Zh+6J3AhjpIwSflf82esnhlieHaPJwP/PgRls5wrWmSmkm9BH+wBv0WdicJcusOiUVtuKe3c

FI4+bGGf4i2nHwzhR0T5VezfSSin5QsHbBkU8Z+l3R4Y9EeczlQmUyCLEisX8/ki+eWdL7AiJR5lfatd

nfS6TRiUwZASiggw605eJLFuQ7X3GUxjmhgmmbitW5
XHFoVDGDGDIoMsRRraUxH4I8mp7EP1ltmpueTsedx8a4klcjKb1fNWf2PwtDdJQSnI9po//+jqT262cU

grl9sUIwQQbLfqsXZUTfC4/5wRkrbyxj2cbsggA9SIqrdb7fRESRSqSjsklWasUY0tWvaZiLWl/9jlj4

cngVs+yrIujefdmpYZlbSMzsH+MC+jw/ICNA8jhhiO
VN3BK29+cky0jWvx8XcqUEo8Uc7b1Ugf0RqqrTJ4UnWt0ZEMly1e4Fx+SMclVObwm3Ed77oU/Yt/PWKh

+1out21tG2y8yj2z/I3hLLZFeGM/UvtZS2nQtIGvwPRUW0BP2aVwH5NqEtNpXed0b/KNvMshvU6uqucp

3qtGWKnpsfAb7XOgyZnib50DWZQsTd+YUvBznPs1nQ
Aumuo3/lEKxuWofr8w+ib/FCFTX21zjhqBkvYW0+SLbGlqr4hOc8fFFsTvIU0nR2nwJmDx20bdyDO4Kc

ICyszO0u8C4MQq0dSM599W23ER8SD5UXSzjZouUjrq8eiWi3rmiwBx1XuA0KXapcm0zbDPSBMiscHNv5

Juxaj5yNQSnLS/2lbubALSeuL2dGRel8tp0d5VZI3b
9r31vBbwNHxeZShrhHQu952nIRMP8P8JLzngTRy72F3CAjzDjk9I8evvoOEfopUVRg287+Lb005BFO6a

xZHeZwrl//clR/Ae9oKItVlJ9SNKZfHLJd166MszLOhn4J5XDVbRwc4Pz2vFQtnVn7YVjIMY8SDZ26xB

zN2EBZ+HZ5pxfQy3cirjbpZy7OUc0I9TEI1M3wVHvB
8MjE3a8MraxYgVKouXx3sQub8vpblVZ0xkKacnHMh6MsZTvdLsjegsqdjSLrqOoXkgQKpcdBcoiqs1Nh

dfU6zbOAjSn8ySp4esbd/0B1iF+ItPhhfajkzyDXNRb541QQvpp+fkEdsR9w2A434otk+e27hve6iQ05

N4H96dTa75BG32+5W+yXcYTACeGfCk+JX/6IAfz/8b
4zXJ38HAtLlx9+svOcX6zT+pwM3vwi14hDNP7bxQGDw/xu2Foeh4ApO4yBnfkuSjFF1mFreVcPfaAszG

ng7XH7sY1l1ybP+QcixD1lSZe/0j2/A924Io0v1LS54X80zGWJ0qHCRmVWpKvIHNPQLqtm42tnCnFgxm

Hqjgh6N4IWgGrkGnHUVbeoSjByUQXk3olFxZYrw1if
ZxeD5MXKZjx4lCegh35PFNsnO3vJzTY650hIgEFeqUzknl7stjFnqaILgVYWyTgCe4dFMqGabhujdNAv

Nmg+lB00rg9KoWYz6BxDeqC86SnATF7EO1l0OhiOObkTH/vWwqiARmHOwCIRtOtqCUGY9x7VUYQvzHfO

JHTjj74HlZlprtQ/tm9svBEHFTUvBxWbgL1FLmpJJo
xv9b6QiMbfbp5nAJKICwxcKk1Atc6toaGU42ueRHVxMpED27AR+1pmJM0ZZUKL9jkoGteTNTyvA+lfcp

ms4XnJb295iMoPHuC9djhEyUhx4SJD+NxYjnkNQ9lrWJBiufHcimCR/ON9Yp0xcPkfrCSHwjfatZYtw/

mrHcnPEom92GS7ZdGcmcRxrAanKPie8O8K+gWrFIRk
ttcYMuozgBMFnDbGfT1XGfJXUkw8198z49IytRnQsm1QY6VkqbGbZfBJs3XxK4XlgiGZm836myfcnKuC

q5Q0V7MWKjX4Ii84Ev62K2DLDcBxOAk9ghpjwJtsY6UK8vpd4awLJhVzFOtXV+mC8iinuXCiqYZWxvoe

ubRFFdkFMoj39jX58npz295lssZkSGAjBHX9+yYIDv
y++uPoLPbXrs9uHt5dk7AWkHOpmUiNh+Q03NnSm+GhGNForyWTI/uIjI0j6VB0sVEPHGuHoJ5JkPbWbv

FANEhYHqPrJbR2twr5ywr+8LN0ji/ECxswOXn8lJ34i74VwWMRHTXTIknmT1pSOFE/+1Ox2dii7TtDLJ

pWTAzoj2McIaFNmGnoGDvx5qN0tec+UMGvXBucYIxv
+LwMG0kBoVsUmEUroeTFH75Pdhz1HnSoa+W+B/uL2clQtxqlrFAfds1L3HYvm314j9Wtxzpq0cAfZyDQ

AY3ltXz2UpOhXtSFt06JCrTzNjryMnY9+TgjyceJh5TI3wOrxhmvOq3Xj3yBlol+cjnca6e/Vpo1b2+W

j/Hbt4PJ0V2UoF9MwFG/hNcLBBmFpJjPk1e7BuN/
arZB9pbFkpSQMcafuk0TC6+ELRQinIgM7LXEMqGYnAYj1Zl0SNLvHmXLeB3ItlBJrCalUTwWlTFe4RzW

I/xOwjwFLswd69/FoNwiqnWvXJlIlqtolI8POjTCeu56hI9j2o74c6CpveG8KLXIIELNUlZ6UIfZj6kc

4tq2AQWe/V1jnzfj4Gk+KegUAQky492y8Jn2+IGjSM
CcOGwzwqT6czIclQcKnpj+AvGAy/9JRibth2M7FbwDV/XK2lUaJ9PN7VC4fvwiz+UX9iYDW9I+PpD4ZU

clf+gJjrfYmm/THKGUcTML8q1LrqoqXX+kWo8HcLybCVf2Z+Lkoivg2p4iRsz6YecXOcZTDuxeFemsDf

S2UqZdNM0C3f1rQYAsV36EUStUYRt8fJIKuI+m/0Ac
txx45/W/TgzTrL+f+fDm6i0fwyn2exUL6Bet6ijgdl32XdfRdA6xMfiHR+Rb+cc8mLwqSgP2j+Yy9L9/

h1fIW2Iqxz5Ii5Q8wVCddYCfzb80VJDhZ7AWWiCImf2is27RRu03d4j6pm7xx4Jsi2UUBbfWwhkzwsXC

q8v7e07/wLkWYvrBtE8Lta5Ncx0nbG210muiNWMJio
PbRscr68UdwrlvlESYOH3FIwPblDIuplMG1i9oBw2Eka8SOKxv8wEhDSvv62NPTcsXt/3ldoCnhfYR2M

AYCSkkXEP+aHDDWig3xREYCbZtxlTrjDyrU5oaDjI0V0r4iTql1bmXeDK+Hnw8HUbtGgH9mCdTogoJNd

+ARdRHFv5L/1wm7ImMreLzxtEBvsDBx+YARC+tTe8M
57Zb+CxlZTgceIfYiVT+C2i+UZNCKAjiyDudYXnYLt6hNr3K/2P1mKVSb6B6QF1qys+A6Mqc0UKSjqVp

7X0J7OclqGvXbPWYQyFf/XOawvCykldbctcAAZfOfKAah8GOfko773+3R9fk0FWTDcK0A5fwbny8fHqi

uWtE1LeEPF6DaqBcTf6S0xovc2ARgpDqCqbDPeSg0F
P8JhvgsvbZzJS3F5PfaQvuWAuRvyon1mQO/0YojHIcy4hIegLiXSRvn3Md+0Uhh3r3goeZ65UulbJ2TS

j6TGHKx280nWmQDqSiRokfNRfkXQbvARl+uy+3RGJWQGGBhLZcI/o27BwR+iApg5COmeal6eFT1ac0cs

r3cMAUswKlhSkW1dVYWlYTAxfwLOpLWiIY/EKfRNTV
J5R0u3/Xd/r4CS90tB1z4wU2tCqrAMiqVa2ZJ27GlX9IKBAhDhxXiVXWIIR7+bm4Punv1l4/diZl4iyY

gOFhOMYjTc/28krVGC0p7RB36S6BonDedbt6KBx6mXlEpX5683vmilAna0HFPi2v4n6Q2wcXPraYtznJ

a5lQ2N/gGwLZmDUnIy9UaDxCJhUe/ntZ8eDE7Q5npa
jAEy7yrMmkOjSRcWla1kkK5KiFfNQdzo27D1ovVaa1y9r4bJ3AUfrAc+Ba5v6SsTi5ACEIQZ26AcDAMN

jJCM3JbuW0LeX665hQCehTpeKVe+uhkRFbX5vV3k6i+S841Kbn0kvudqjJT8lugdlOPt1nAFD3bKUkYl

jq9f6qlnYXRpu/yUzGjU9CW68DCnfTenwELhG6aWvA
2V+EswtuCJ2ilFIrYo2zKEqNt42iNNGN+J5EEY4A84H5Z5lS/cy8Bh1Z641XhVxYVr/TSD8Tg/4yny3r

cxrIrINMoTzTRsGXz9V20MgHXWrtCjyj7PqWdj1v8DNMs5kYsGh/8T1jlPtW3+4aPwIVyOouH3m//A+V

4pGwYyHuZ27Wgs5QsV9vL2tWXn1EeDtoR5iPKWXc9D
KD2olBKs02NdGxx3jW5UV0oOm838JUy+/wrf4CTizF9rK3K+qI44aaykfDmJitVrCaeY35N8RxO

KGe/GT3d8Q8aqFHR0Bt0eekG2A4JPNDoJG3UsK6CZVYALnv9mvoRNVxLGh6ILfifsmAjWrTOsJc27RPo

KqrN7QMs4Tv8JO64MKnMp6lR8Jri6lfLu/nBE1L4om
Hc7eQe0gxjfMjopdpQx83c6pe+jEIH48Z3LSLb9esZYjHgzyOYXlNrO2moAD5L9NIL0jclyxKuHtU7Cl

MawfO4ozRL7NqLT0/3OtOCQv+UTT2e6szRpOLe0TUl5a4Kd8cpI7D/YXd/2VFu0y5q61bDtnvmhpTEep

wTB8iZQIddUx2mR3Hzo3nXg1fj+gRexSVUFWk6e01q
GdjhE8xqhucKeFYNOvDurwIDudKvCjuRRveusYyrTaV1e96QiNpHQ2VLTwFPL9pk1KwcrroqzjpzEcJx

zYAdYBTeHaTHD7x/O7ubrI0+mSpytQGRcxHoTKnInwRC3TiH2iCNk4aj3mJuNeF0nA4e3hVrr/B+hSLF

ZfAMHDj9x337Y68wPlWyjQGr5dZXLOgcLL3DkzEysk
Y6MrGL5wg7vmJCZtLxAuumn8XC0rnmuN24JIXHOk0l4DMw6xo5Vyt4VQwVZlVlm/wx2bEm/k697nC9bc

ZdoEjz96/RmI1JZp5ck5UMDtmvHfX2d3C5KehFZGzmyNV8L1Zy1g0AZIN1F9xWnbC9YSHqi2Uj5wVo5e

7uYWjBYyMSe1D1VzPt4Fa1fp9nNP+Qs1OxZHapOHz2
pxeA2KjHofip9eXOdFLYW30rjaSejtE/ilxr+fSIhFhj9c/w3bMR3Sb2gvHzsGNNcPCSwFzrmQEV8/6y

pEKF/0EHzTuuKt2StKIawQF06Z+WI8IBknuMicvrdmOAvqzPNGYGIlCPzTdQixXiHiibuq0dm7t0oyWn

sg9oi24fR5GLDmT0I7Qsl0XOOVkoI/YNjsMaULawc7
t5KeutDZuTGdwSwdoXugA4FscY+jK13o5UPgdJ5BQk2/h3ijCmjPL6/ynJVqNCTLbw6n9OAEY/mUb8sX

Gcb825/KpCwXv3roVHl/gltVd229P4QG0mlvYU1aiO2K/kpgp4as4cCD2Ju03BfnPR/hmhCWqIHJBXJ8

atTr1Ep16tmiDDZffCGk4VnLsyonkX1hL6j5MzOG1y
DUlMSq7D7iSXg/hQktj5M6lwEKXpddYEM9U17OQkoJHRqimYiyevL76/Sf6QMa9ZWj3FptEM9dFuqC4i

9PsM+FKgba7RlfoNsIIr9TgHodE/dB8B+mbbpPzHEz+UdtXoWm39TxyAk48Ncgb2rdDOSxHCa9XWj0lF

hT50FzU0floVm+M2lB0aU3j6Dj55ZidC+pHOkmzGiB
57OuxsuFjxcc7FqMWgv0PPnLZVd5l+ZriWMgDQaaWcUsXyIT2YWljWt/uVD2JN/3hbTNCERm4UdA8sGV

vTm91mzlPOnNpwiUtNx6mgXQklXkUVf6j6z4Li02yhQRub227cCYf9KV7S4vkCwqvtjiX6cNO67nkaSG

s/3QuB4N2w6bTZeiLHMWyOf1I3mdQxvlw5ZssN8cLL
+eBkDunwM4IMnEDb0llswl7lx87zSQXm+WbedMJ+nSCSKO7IlCDP1hZ47LvH+PKxQll1RH/TrWHbHWdb

k1AfWBy0VEDPxh8d2KHRNNHoBTITqch2/NFg5+vbwLP2N3+84l/I8OwNEWEmtL8y9GpXskIZO0HBOQn9

C8D4D2y0GLVZ4XD3+btiQu75nn2wxE+YEJUC+F+fV7
z1WN6niYWYIhOl+ghRBP7nqQlHO9fXeIUk9rYjJZGBD2heNOf0Fc3xzUULCgZy8YnbwMw5NKfpf8GqSB

ViG/4ivkgdGhpqfZUwB+poWRWm9395qu9Z1JAMYUWlry/dfR0MPSbadQEKOKY4w+0vYKh512BZyxS7/F

NTDGYGhzg8cgaBWbNKxjvUwSZCg1WLra91xDOuc3kA
DzS2bpetIrJWR7zPsdeMO2mIyxfw7SKZs2mIInrVP5PgcoXCXYpBwIA1odsIWsMxBsoQa5Nroks9zzyY

c4mIyM/UstXww3ooDExRljEH3oFFgPLyoF6y1rbkBWdkvF6Assw274LLVr3V4ITn/whevxYjblxHGO67

E5FI1Hm3qfJ13AOTsG2EZlgJbB3zpe+WFL+5rQcmde
ENckqK2RgdB5KxM8gab+EgSgRZtcllEZ2I9TIPcZGIEEihrQNe1GTDY7ujDADGSJ9YbnVnvn+pQQRRpa

ToSVVNLwM9gbTQc2L518EubJPcRhTIKl6GvrqHOYSa0Z67akHeZJCwS+gOENv24pIQEEMWf298c7/1io

YGnwOheB8B0Z8mehpZVHvryDpqSw2c4f37LgS+DiUt
vtU5HHRcl1D3m/Cn7cq/B8DstEVZLHb0BXVXpMHVg+qIQ36nMki3u5ZY9XGfuvdx1ugyhdi16k6Ikz2Z

dofko51xbE10TEq1CDgI+CKNta/4DrfjGO6f+cY8NXXi69Xa9r+a+yGPOuwIcO6n1g/Ocbmy9odRpAhL

cHNdaX0H0aLA6u8WRaFfdtOSrEs0s/qyULkiRYVJkw
nGseDB0Gf4kS3kOzIM1OG4rP+vYCM/TC3Mk6O4rc98KzTo4Npw9R8ixPZQftiEjVbSmc99KP48Xq4ZPb

C4kHrok+42XVTXp4y2vL8iil25Qb+f17KTL8vYPxCXhQfl6jCFF9T0MKnw7B9cSsEKhk1mFkYaIodSN+

NVS1o1yWuWVTlcZrOVHBuCDjZy4dsa9HzRakxYkKaJ
JVyy/PC9zbfJXjG5BAOfGQAXvMSivlREnfKeCu6iJCTUnFnjlKSpXDQuvt/oPA7v1yiujZ2/19XoKPtv

X+wELfwIP3Y7y/Q0Rf8sMrQz596GdWvOl/hbgPNIhsFFVuQ9uQo4bNF72W5/va302ALcTBsmj3vh/nMc

1hdFc2g//1lBl+sbfN0wN7xI+N6LDedbEM+UPvsqiz
G71hqYtQTaosuW68vUSTgT8zf+0R1ulQRem7GTl5MJpU/F2DBU/Hux0H6stDPUek1y1ztE2vKAXImU/j

+a8tvItr2CPUgQOdaBlQLdG5+2Djq43knCvY7dQaoavSwXyqfdgxE02mFSEeIoxlUT4lv5u4aEWgkWPe

2R6K0+ZMhKT4eR2/SClwMhvIgcCXZMjRFL54l2NAkX
bagwcV3Gjb6fUXmlAWH4ASjl6kVvqkY6IopPQAwIEWfZkRJ2//ODVfrT9JHGc/oP2J9QjusiNAUaW81w

mR73BbeAgjih345OGBDdN24pVhZroCEO2Nam2vra5rKMu+eYaXln+b3LDgcNa3TDbr38Q+q86zFd7rzc

gm3dCmFEEv3YOai2xrT4Q0r+An/AX7CzPYIvNCDJAR
XX/7gkpkRi0BfhxRoh5IKI2S86ImppA2QVdR5SOVrDmZWVz9oihRgzqLqdU8+eWmG/mLHFxPuzS4CGch

hit17b9NVbUtVO7gCHVKRG1/xwdtar4qdz/dSQq3L5JcH72ziFSNRksMId2QpM7v/fRmMhaazjzl69y9

JlA1mIR+aEWei64mrZ7g+T87d/DtzPt/ECERo49GjI
hyz08iyBHs2U2b0K1W+fL/pbnqdkuXy7pOXUtgDdxxHSNgI5IGx5Dei006CFI796g2uZnI8EBwawIuYP

TnTE8EQclmb5kPXJznqeskj/apYRh51qtn+uHqiF6xSXDMvb8SG5/URk8EUZaoG9iKPeFr7ZwDnJQg3j

xttBd6L3DkROnlQS0hYH+laXttzDPoivzlhqi5zOfi
w7kf7aY/+qzYsdjMbrcDKykgqWQo1oeIg4EkUoemv6J942TghjsmOwZ4ULNcATjw46DEDRUeYUHCq7a+

oYVB5ovobBrYBuN6VInudDx5YbgjSY6bxZ7sO8Ot++KODj9LAka2QG8FVoJE/5W4qKndoTx8KEfpDOTm

OzrL9ZnKRX6Rgz+VUwDXi+SnfQJ6B9g91c+xgBdhpN
6TW6QZdMftErij0V0+37MD+pX4Uh4Jj//+OTj0ggj85iaDSqGcINdpqJDx8Hfb0KDj5vRK9b/gmNUxDJ

i8UFOLCJk84U+iWj/CAUYKfPOVRsaQpr2buT34Usd6MMhdMLEiFfgojVdr8ogHdKw4ZvhSVh5rj5niiM

MVf2/8fnualCJCM02MTt3gsjnCUHPfNZYUC0YMgIq7
FOxETEq5kOG2J5R5dkzD6CTT1nHFzTEGc0W0s7b1SG+wV39NdpthaShU7Ib45Lwigk+GvgcnAPamWN3M

wmNohT3bF3ni5fFsOHYobpcBkOcu9/XWl/iRa3sJvJOA4ukaQY5qbfgTyiXhtmAeUerZTPaq93fKWzAb

OF8CMweFEW7fReRKucajuqr0NzJIJdfDPoJ2n/JB9J
8/x4OPCsqG+SN8twqhuKuVhjSQOHdk7tOdAdibskXcWMODvtd0I8jse64x3Vfq6Cs3X+9p0Jr8FE1mfH

2Fs5g3ryZzm6/QpSfLKsCsgYWYmnhGUuB3aeFfUnnEgabA2k6QcBlw4CIzNPl1kiyFsTv6vCSXU2uC4t

AqB48Y7wHthjyYhmoZjpbM6U0W6wcU2Q5qR8uog/yP
PdXmyqBhYx1mo4+i/2bDWEj4LIHXRg7zKG6j8RA8XLPSe4o/WcVlb3R0hZMPjGGD1AvfAJ++3+aWWHP2

ydc/xi4E5I7GCfTa+JnAP+uOsMDJ1Zqi1ljrBp213B1aIZ9ks4rEv8Kjy4gSWNMZYBiI7E9RBGWrqhlt

ObimJ0yJUNHrBqIUU+5yCZp72HRcVJidHaGm/8phup
2JiEk9BRCBG6ZS20ymKbEVN534Bqv5Qgl4EzE1KSdA8kZ9fRH/KN0Q6P1eiKl0j3hTQ+6HADBD6Y/8BL

NRoGu7AtJ0oVT+9dpczaWOqHeiaILj3ji3Whrp92/aru6l7zqrhHFB0SdRhd/5BpQEuvSn1T8dNKfFHE

Q7R/uM/wy2CkUO8/UUgft0zZGH+3JOesIz5kr779Ij
xmQA02iTgzQUQJcqARuXflYa0jCsLp24JKwo9S9ra6ovQ4S6Rt70kpe96BmqNwXKdZaQfl6T7FRHMmaB

G47u9sV4VL0IdwdQxjtIG7LGlvAVYSaEIP6udSzu6nxcJA26ajJb2nfJBmbyQMcacc4AUGPTQw2cNhzw

4CzxheUSr/zKJ35iESW3dfjkG0GL2emATPhG//kp/+
Dy82eMYMOWWFb/br/+KHvwGtTA3NwbmrhnAvaT5yn26Wht/+D4oMjejjNwVud7P0dwAtgk3dGkBfmSQI

o+mqgR7mnkeP6ZZtrWeKGoqfWjakRQRJ5SxS5vl6kXNm0JC7e47hDIAI+KpWX4L7LDYfFDqfVB9VhQ4q

Lh4FLUJCafzRTlxUZ0IySE51cwTBPICSfVClUjk4Bw
rFa1yb3VFXH2zohkPayY4OZ9sQ6HMBU3ImEMZqmRWrkXYW5huZDM/ax+3md9blnydTwbpOH7B9f/8Oet

/nhwAupsCM1HpUXjBbB7DtraxbQjVojQ1tmImd8luN4+KeJ0E3DKFhltz+7Hhe8VhpdQ6d174ZsUaGIH

WLPAfSNVHyNUKo5BdzgfW3HCPZOt+8MXgCGebskEhN
+BW9KEQIfLIrtrR9ohqLG/SamSgS0WqhxIM/zZLFJwG/pbYs0kH2DR2cd4OROhfEwBzfXyx7IYhgQj67

Dihc2zUr6Nj8RJsF/3in4xQI8KSLqrYZGxzQDmqXibbRS11Djx6xC65T0xAemrMOCr6qLL1yvSnj6cXK

W88FDBX0w4J0DhVAGI+0OmfJo0TsA9LcQspb1jWaTJ
ZlmXnsCgiHtdNP4V96rj9nn/4t7A4fem/pOHHPQcQrTWVTshlPX0hs1+RjtNyWOP7uRvyk/PneR0ezqG

O/tIUdqT1Sgp7lrkJHJ5cWIaxKNHvuwDP+7vrvfDV3QFB2IHMSzWal4KBuskxAwxZQlvzM1P7Nq6oEgo

BiAM+b7xhDUKw+JaN3dL7ywWhAT6QwErlmY8YrSaZz
yyT7JkCDin+HN+Y/ZLTUuflsUw5ZxgHwzTioGaoh4u/UZ9Q8KaxfjfmH/qcWZ7HLBSJMv2anLNUuYXbL

J9joWFpFZA1l61gtULz60ZdxrXXLn/2hdBQr/krNrAWgqC9moSBu9iVqgKySBUzyobWfliRW2VCxjFs+

vi06x++e/O7RL40FrE0nD/5ynA77Ywpo2l67BahaLJ
gh5juBGug5Tf0oV7jy1cl13MsziZ8c9AhPzePn+GFCWJUSNQhYVOOezptlMDVdwdJlNPQagsd33tuboG

H0ydUc9wfhYeqcYxJC7dbmM0WYdAMhnl0CYYK7V0A4EDPj7eJV2rmTFyFB+6ZgbZWXY1tGiY+PWUM4am

5ZtFrTex2JOxjRrqqs3YryoDkiLoDq0P+8398rMhNj
HK8ciV2Uhr7miS9apMpqpfDo92KrMtOyYT0qfYKY6/LpYLW7tuLWqmCldoYs8tFVJTGP2GAJXZhnsJ/E

9pDAP4S7n/eapYa/RErn0JrKAZOP+9L3nKKi0F+NpQvu1oMZMyy4DamrdZpN/s12LRRt9Rm9Ca88hSK9

NHGKYibT+LgD9nbCNW/V3biLg/j07Jw90aNsExKv4a
eXdxzA1EiqLdtjUCRIaOlgOjrSYVFOXXFVWdu6W0Ggd+cpeSMVinT7MrMytOaDXoPX+IokYKVy22FZ6C

JesrW6ig4j7ppIbPnnpJS7f6mBOkEpOtjNB3uZcvEFKYEe7nmeu+kz9vcrCPQsubsqvH1pBQLyEhNyRE

djfP3UtOHWijWVeTC/b8SuVqSXl3w5D9Rorsv5lRB+
KZsh2A9edEL81VfqB5Q4aHWP0Et17eQ7rlNuZH9YqFCIYEtbB54MF8SI9MQVDka996g9E/Krx9PUpTnJ

TgvWCrdgo3e/m+zdFT3pU2ulqcM/Hy7cnKQIgpplKDvCDJ0zhXwsGM602YnTYSCpwkBlkOV8UV3Nk1cn

jdHBfwQFQcYg4d+nGixLfPPJrSiH/tNqxf18dTP0zY
UYX+wsZADxFCcfu8LzJ6X4JsWQAfeVfipeN0B7bMCD42fhvmqJz7+nGTbXDfPWTG+OrQX4ThGL7VJ+5g

nHEtSTae6RpQzY/3jMIg7rPhL1n6VQZW86sjhkw76CS9wA1DwyLmWpQPUod6TQa8ewV7ZqBcd3WKIlay

18x71B18r/m2ptR4UkACEUhA4ab+iV2dEJIJ4Rypd6
wZQ0w1kkjcFMT6bnk5zXMCl39II3C0EuE0OF3JSFF2Aom0qbd3BsMITk+uK/jKX4rMg9bXCIg3bziCts

nf5DmUEFBJFXpWfULxEjUJUEpcUkCZMWNHGiPzZ2n+8/v25cwL5l1E03ck/u23PP/Z7P+dqgI3oAUCu0

pppctSEk5Yr1KBwu0nZtx8/goXT8Z6hg3/3iuOV6SS
7su2UHlfcYc+tPlbO0X0nm9XPqUa8a3xb22J6dDatr0VQK5Pbuf3kdK7/TyaHP49uR0VihxgadgZIye/

A2y2dkjpOJ8iIhfWicxbwDtgHYrpV20lDnMsIQ7y/yTlTTLdZOdV817GpVDBJ3HQZ6iVuqh9jmcGG0Le

esKZSZDzIkl4yhZXZ3lubWK0mNq04I2RGbH9uFpFiI
WhuPccvMqaCP/cDVr9kB+/mmF2/kAdg8r9EBcy0UTjPzkvthaxtG9h7Ib2moECDRdJeNPalkPMI/hHdC

uTHRg0lrUAgCntzcQV24UcZuZfdl1yKT8cg04nsly7TM0brs9j0WDfBQe+7+9KRbzamJplo86+l/x8PE

5u1meOyjsDvTm/VmB7sLcK1hgJIViFNApLH5msaVSU
YrB+Tu7xl3YqJvd7RCnEZGxhwfju2Uhg7p1U7gCamNsCKtLqez9OnE2/C60E9XAnDKjn2c97J873WFAk

YsRwvdwDd2zYiWqXQmq5ye3NZkuWXw/EOid/XgUgHYQ38FsDAGjYlbfMures8FowUzv1ITGixL9POMKK

/FlRoSner0GLYjGFyV5fgITrkOqpi02O6IO35Xbfrp
+ayyrLaMriSuVGe4nOjTYawsLFj8zjwzysZhAdkkgK8CCvOzkuYo64HASwOqxSky7IZNkEm6R7wpsbLl

6qWh21C6X4yM8mkTCs948vaEcth/RgwiHyEeHkc5OFIw/ma0lZUEfWNiUE4PF1xpoZY7yTnx5zptlFZn

OUkjIw/zTeVZ6T5WNIn8F4ebInQAPPt67JZIpjkW+1
6ZosqUq+w8B47T0gdHIGUnnw/y5eg2IWIqb7Fkhn9PcUnXkdLNKj56vbpWEI7PBKtRminyoR2n+KwCgx

63Nb6a+iucIPWZO/lXJ1HwLyqcBuDLpweWa/yAEVo619E40+k7ubaG6x7+OST6qeTqhOxdLo14INgpkh

YA8g2s3692zkdKZtaDdIL/GpN133vj513kT6C7l+yn
n2bDAp5Fw7MEews+979TS9qKzQjpKgUF8mn6wxdKnHKNBle5FCf51OjAeO8bBgVbGmWPvQd+8xvO6E1J

V3XKatSKmu9QwYExFZZznH6Kvz0CWczbslE/VgJPhPM/QLCyHU1dd1VOf7W16qkA5cJ4sRZXObrfYWY3

zhQw1JzmvlVJMRJ/1cdYnz/tFPDQ2472pP6T/rL8jB
cbgRLZXJPzdPB3Uth7puRhvZXaXAUATCoFeyRdHa8EDJj9Yb3YMWeV0dIrnEkRMcXh+knaWAGIBHacv/

MZ2GYm789LPLEjvPGyuVxW2wUMPeWUSiZCWady6NlkKp4XL6d7NJ6jnjPi/vbFL58CbILrR6MKq5gnVl

s3bddSyojT3NhIbX1nCLVhaJsgfaLDgoUQQalNg1Hf
EQrEb4CX8khionP7yF/y0t5iXLu//7UE3c9i8W/7a+cY7V81CCNxeS0huMZRIVcpRvLQnCua/vJXCB/e

MpPNwOkwZTw/t7A8Dd1vDzZWb8GlXYp+P5WbuBrKY5H8Ar0pCu9Bh+9jonPvWpPtbJfbKzSt1w6dzyKd

JW8Wn/ixY8Bypc5A7ZHumtlIUjPLeVsyvhsKG+y3Sd
rkD833JxNEO5quH1WY4rqPJW3OyViFmbTLl9W979/yv3VXpQhurNRAewz/SACynr/JuISqXEbG7zxTS1

tXWTpBtDorHFbcXC5+GQ8/xcu452vr4uhWeMMTx1ATQF50s1k7Iw93J6TEfVjM/h0RdwsraQHIgp5aqw

rQlnXUnZH2OozGx/BPKYmP69mEA2oBPnzOYjD1viBH
Y8/Y2pJgqSylJ9frgQPqqlzyzgsnb+EimW7E89ZLzsNDQn+QjUzwCDRUD+DlwxTQH4kP3pQIEk7jp/Ni

DwnY0+sDvGSoetSDoQr3IBbp2pK7gYDNHLwYKOeLt/2ohfG/OEAymE1aT2WbsrLmI6ssDmCQ65iZOPNG

l3px8/3OG0RIHCdXibekL/Bpl18VufZjUpF8AquFcI
TgN9JQv1k7EgW/Wf+DU+TBTBqClx8dDxBpQudHhbezuvhhK2+Guj2bntJmoJ/tTrNNrXeCnYEW/hrxjR

wA7QxVknKtl1cJaXq0sfZWtQi8qTH+QWzMyyqsiYP9dHl98C3+8aaxQVp5uP9l+74Jl9F3bXMVt7Gu+5

pP1ivjQr7O/dxkuDocXjBS0zAGzFYgaesSUvhIAPSV
prOpKEz0SWV6BTwnAZKSewwD5QCRksFSogJDmaNr8y4m4Z3JoevHQgxiQZaKJvD9T3EP5rgVpDqJbyvw

fI0Pn9+9Hvl6zqA0/Jmvs/OMsWedrVYGeMpEhBwI+nrTfA19UydBgCyhw8omz5OOxv0ywePya5zdIX1d

36WMFLtwQa8X7lxs0H73YSCsY1b7bjO9lscjsbSBnD
yipSo+TvEtb0oG8i+e+qd1vDhvgospGg2zuvpNQRG6dxRS8X5MV6apSgtiGhCYRL2NokgEIOiw30owNd

NXa3onNssvlmyQv0mPgFX0rCzPV/5xjvI306UKCx+BQ4JVn5zISWbEO4DqaNSs8GswbFEMtBlpSREAxc

gDPp6qCywE3K13i/Um+nJWBPcnkciDapfvLPPCvdIO
H0XmN/nV5inYOKAoVfYwioG++aM1Ax09DvgL+Wej9krK1ot81tNKjoNs5/EiMQD/g4x+ByNqKScMFr+/

nXOA1g7PPP4efTtyANoAAo1BtIN715CYc2L+qevbJwJSYgVs1mFYj9j+/4sAEHuaD/wUMhAt2LK98Cp4

1tkpjFzEcTsu8t9biZesP+FZZvy5F6vlgQUCkTxLBE
SLMAhNbUR/J7d1XwgAL7Da9VmaqbfX8dqN9C025gVsRQQ70j4hpzJ/Coon/at5lpgA4CY6ZfYb4cHx1CDu

JJEm3TNF+d8vuMo1Y40pWd59a7nCqwrK8GB8eCXyPB+3+78VIfnYksduqwlxoFD1+J9P/D3wvMf4bnyz

2EgywNKrPXqAeahUaA3GWoZTFOFEf08e+YHqR/aif+
8KpOfVbBdWpa9A921gumgsEZrzvnC4d6QvVz9L5fsZoKMRuG/8uUx1E6qsvrVjG8XeGBgpbyEs75/2/l

8m1mrxxRb12dzc/MFfr1coSbPH12G36v++JPQ5l8Xcsh41A74Ij6AVoLGONmpHyQZjrRlMDowUePKgB8

rilo4Cgr5rJgJbV3NmKBZ/CJf5VO5zTF074Qvsm2OA
NV0J0XEAS6MdKjf7EWjTAm4IRA5aysuNJNgz5agY77+uYjA7WSvWd6sSEtsnQTLOcvGvqRVbVDP2wGKn

L/YZSEyVlM6Fh7xO1VUmUxN12wR1sRljoApqL8FfASmtnWCy8l+d7ILl1ABnbS3FGOKnGWnJo/TbXLei

V2LfCDZjyYBOdTiSLeJ5Yt2085ModGRSgf57zO3edN
FSE4tTPcJyPEuK6Ogj6uoGpq3081sVy3KthN3plU94p8ECEMdYB5YJ9yWEDmYRGXcvTUgilLUHGWEm5f

56Kdn0saxb/O4fP2sl1OTLqB+7MUpnQm3dLdZt/4da/gdvzoFv3pZ7+/Hj/tqVx2bLks5aYYSaa4gcba

Qy6vNkKb+TVuyzDdRqG6IUG46bqKE3h7ckJufEUMOE
Ji5UhBNeJkwG8GeVCrXNdOqNTJrrEtYbffS6/ZctzPo7dWTw9xFLROj1CUtU1i31SHBobGL5FUFxutv8

4bjxTLeXxjjDYDNgI/HqkhRjGGkH4PX0WEKvUcplzhPhIrh2xCw2t0RZU1sArYF9Vup/MOHSggvp6BIW

BB4Vx0Zie75zpLZ2ZwDA8hvVIPn5my5fQq+kRFozV5
6lRTTw/WcA+g4NCGWQgFGyFwhpAAbQdp4eD0Jk8dL/tULMMsYDtqrwwCyyxlSGApSzR8VAWFPOaV+0or

M9NFaG+mGxHvLcabH7Iv6seL+9c2IReej8boL0Df/MyNwByczt/UFrpzBtGYHhBT8asmh9+Y9VITll7i

QDxQir7JH+44WHi4eUt73savCrCaivbBnOhXu3BM5b
/JMevOEQqbMs7glNXt9QpgxLNTEzyNaADlnnauyBh9Q1tfu80bsgKhgCwCwGQYxnU3IuLJWa4pGcQ8zp

f8XvY2YgTNrQStZBpoeT11rP2ey0G3TfzFTASUAKkRw4mGvUxWd+ggUeXhw6EmEX9RtFIkKU3ePIdt35

/U7SzcvBJ1ma7HlJ4eonpTYSn+qVN71iyQ3HcoLP8n
TToaT9TkVEBlb0I79zeErxP7mRI3xIidaj5TvaI/mFF2O5n3NzvtQsX5DkPMlHWltHY7cBH0NG40lRV+

gJrr1+QxR+FZmM5Rxrc4ulysxGD+n3kff2UgpMPB3+aQ+FC2zphT1C/DNGxxQlAO5LnkOFz+4iLULDHO

N8ESKXWb1Bhz7SDEE6ycGT5f8lh+zwdcmGmdjl+cYB
EwKk1KOZDee+AeZfCS2fJUm19Hkghsa3cSM9P/mt1S3DPT2WSppORQmMd6sKmvJf9V4edpop+8+h6wsQ

jJHdZ82mRdke2HaHuLf/zk5feABkh/JOTjSGBloUMxipPodPcq7PYc2yBiCTjoa5uqb2KGsgPOh1A+H8

tM+4/bi7pj/0YXHV8g1xIIr05kfJYX+TaOwiZa0msN
aa0k96/ALv0Cv/iPaX//Qmy4uSjdOhwTfYE4txK5o3ORKdG9/XjXYOERInrWvOvqc9JV77mHoMnrHLiV

hh8Nuqz2NT3M16yCh5/TVlK64veyCe9CX2ghi/xV6KAB6bpB7m7zJ94kIkx3/RtpEg5Fo1lp6EgUmUjw

6i55UcsRpoCkG/eOdoJAohu13mfJIDgWDJzGO99NXk
XRk8NId2QrHiOS14BuPN7NXIj6N/4Wdb5Q5F7act5jL7F7tNdYNgtOJ9EY9+x0N8kT47/half-way+Dsb1ZLS

9ezbB+P1QI50FV8yxAHRdKnYrx8FZa3w1GFJ1NIczdI2RdNPAC/9eVOW+Vk1Tw4B3JR6O7ElKvbjIj0o

7jbVPQLz6zZJHllTnwceszD+wTeT6XZ/1Rm4u96wh/
PR5fO7wYAs8cQRFlxV9s2IvROa+e5AAJl1w+1LC0d31QaORECSi0FbU8SeFE3WE8XIS+wATb5S1iFKZ/

QRNngcn2NggbL4ijtnpEdLlHxV7DrBAgVkpL6z80xCr1jV6Wo8g5vwQq/cVD8a8pYOzEmFd0lY4THHRD

fYuwlaBcKprHPg46gQSaok5oGZdbgECuJ58tUeyigm
33Tx+LXP91+EI9QC2Vcl8wyz7GqpzmtVswONy36GeaAOXM72tSL9GjMmg5JaZw/9PfRQTPmk2+kkaH51

YPgUQbu2uC302S+xOl48dHCI2noZ6MreHGUSwsLQQuhRo2qELHvghPzW9nRbqEbZGNfIYDRZtNCi0yIw

/kt+aFfIhKu17LDowDOY6MuVvGGAGvwEV0X/JsbRxo
gvnPVLXpw4B8cTL0jXwA8b7ADUbQorptEs+rFuk1ITb5v9fW0vfJP3MBuz7Q/fPfce6hP7D1hhrpZecH

+klUBiarknjYhekROIz1lb+X/R+ehQFiWwgOCH4GMZT8IfLah05orckC4hL2yq4Izzby3dQfAbPMeCnk

ci9GmLwLIZtX/dlRH71ATJ+e2+KqOhADKFCCaCo2r9
5MF+LihOpsxazqosAlnOiAgl5lrsXcJQzCW9iqWZYCt4NdlhPMFAQ9UFPCduWojqo5/zsplh+TReZ3Lw

ohBrFZNQV8JnL7/vJuz5PwNJ2z6luo/VYo0avFRtBW6UsScKf7XKa0AmJ+IAT39i5uuB1wJ2ITDjLldq

uvgjfwxhRirIKv+wfH3+0Y13JgpXGDYHHeVwc1VA4O
y94o9v6m79U1PJh3kyCHkeP8pmIR5GvQtBxf6AnzpHOzdy2cZgUmjeaagZeWO6vK+SFYapt/AxnHP2IF

g3bv7OyAfTK4ZtT+/3WV2QF1ooJxDE7QKfz+62y//2sV1vwKFgN4tm43xaD9S6jFWdQJ+T3Xr0XqHliR

jjzhW84JsQ7kf577mtVOIN/A40Qxzl11VslW7HBb3x
2vfwLdh4h34ZPT7iO8+urk3Z8+YvQ4E8RDzILaYjGDs4wJG/vHoKOiEzYTm2hn9ZyLvkq9xLchNC1Sel

vyuapj/KnWRg4+HSk31E++XbAwe/xT3bJm4znBqXgQvKzTwyBnzY88GzcQi22I0chfFrv+PVSqvoEwNw

+g1KJMsgw68TzBRvMN94tNbbkn6leN695ERQF5NvdU
5N6Tf0vccofkN81PR1x+ga85jpl6YNvmAt13dwvH2PYIp+Hqo85edojXNE1wG+eHLm6T22PJ7BKxBAFo

xrhydO/NkjFJSzzKDxe6BNdNUh8lCEtwDZvtOdAnWbQaKUAypGz5lajSW2XF5P6x3eBHs1xUucy5j4B2

FfwTQljYcRRtk3to/gB85EaDYlFCbKA2A+x8HpHpmt
YVlDZpHx4uuS7Or0FkiMbOvltWJbBQBkD8cWVIWWoSB8T1lLWVMjKvrJw29MVdDwbSkfiWWXt4LtTr7o

hQY5t2gHDC1cHg167VKBBfwKXQRvp9cFG834CfDFqaKxbhaQcX3bgtMOzvvs4WmqPGdmhcDFTmh866/r

4Dlzr5n+gcPl9QCbozF66qtJVS38suCXBSB3/Vt+NN
Cogev/PPxSLitGfxzDzd/EyWEx1n6NncLnjonWJjJVrkdW+JQPygX/IRX42qcxzBepl2tIbzpJshqccP

qlYg5fq5IAYFcE7u0aJcyXFoPQtqD63/vxZm6gn+sxYRey/9qh5LSIMJ93tqFMiUPSVq1RGEDe8rpuRl

RPz8ZS5WqvL+D6cPa/2VQGtKh7ApmXKplA4JQlHm+u
VlFg10fyx+hdctX4CiK3v0fZfNTtlCrZpbrilM1hbuDUBFOEmanv11W8qwNchuM7zHoQ55rg6vyLezdv

ac22cKpGEltfG21UncPTZG8UCH8f6KIPcbB1A4yupy+4OWeGj+eNNS5OJHNeClm2JUJKKCcuc3g0G7qF

Jb6EzpSb49Z3PZjD6ZMHrNyfhX+Vb1v4S7xRJRVmnw
1DItV7HPZSuqa7Z1WEqyBHuf0p4ZohIWl+/MaB3VW60/MYA4ePAK4NXel1ICaKX3+VdOoiPkSus1YwKT

/s2zdTV/CXVYnDjDgP0f9UrVyUQTn7dzn6WvDqD2Ybaa4U5SuC/6CpR6ADnPRFBGDmx5kfFjBZeutWeY

jMew781a6DldgaJvnvJ+B2+46G2JjG/855JiUumlCO
PSu3LLhCltHKXQjn1m5IHTt5xNM21jkq36QEQwgc1BI9sgoGWrIKPPf8qv+DbFktgTarjlkIu81xcAWP

3646OKtU8T3jLY0x9nNlZAqLZGl4JtqJDYZ1na+TVz6r2pkUS1FMNKWW5A2N1tENZyxsexIJ/koTDKJW

xmQXG7L6VVdsPDYFIi5W1T9evVYeegYkx+nY9tABZ/
LHADi8H30CdC/t/V4sRlMjB3JvJL5HjZnn59cqtW8pya8yLkLoD/hK9yNpMfkoYwLeq8ODrR9/xsw3Op

kXLuu44sNaCcNfYnH5CkBlVVornQP0FVbj+pBzQ9ZN+HGDnoiZCJwJx5HXwnvlIPirSxo9F89DlFkYXj

d+c25qgLC02G+2coyEzCt2Jr+DIqYbrjmniaPLd8WN
Xta1uoqrYMPkP13Qsn0ib/q8FkjsTn3CbrO4Vj/+oI1Zs+SCXddK1ypDRthNolJct/1CrDqdCEF0DlpX

1yHYIOP1YtXltSg8Cj/b61al67kAq02Id9Shs7BmfooRe8JlEyGcS/ksbUbBr5UeI+CqQKgYU3zuoFIi

yBlXnmLHLLMOjUQCfhy+0CSuT6l58PyksQ1MmKx0aO
FDWvvFhaCca90NcLuytsefwrJdzOaSKhldqraHRYCWWkBe5bKR0ne4A007cKsiDn0WOHCSaL6dFNGuSm

OsIgsQ0TgAVrk5cNv0nQPx8XdQk8uLii8esM0almCH8DvfEmOeiFg+HUBnZB2khBWBO7FK50oEsBZqcK

WDYFEZss/SZ2SxpFJnwIqZT5A+3lx1invV1KM44EV2
bxkL+dk2cLVKKD5sjuDlqL0P83nRwYCd5yzF32mnH68/IOOJ5QwFRPRieg56rLVxXWmBuBswXIuCpUeY

7hS+YxScCZng6ro3KfsuyH+Qcom3CpVrVrHeSRf28TYtBuaYAm3M4xHh0xBf1OtxwbCYPsW36iqR+07Y

dHcykBfKBQXZkWnP28XVKhO3THcK3X2mcomtRNB5V6
jgy+D7J4l1Nvw1n6WYg5t9J+uztElsMpKDAJ55gBCSJttybCO+OqDOF1L7l5B8G3K5dm05hDeZzAtqvy

KNV/R1p376p1X+dZIvGhfcvxnpNfwudENy79E+5UcmMuNuy8OOUA35rkc+j13lLAHCeMFMD6ggp4Ut6M

thXXz6DUt+2TG6JxzcFaQWXTFP4H+XTKJB2DxPBrLe
lIdmb+B6xozjlMTHw1gG1AMkjInNfo77ykcz7FWw3nr/0lxaWoxo7ak/t7NxJYYBrYDqODAdCpcVMqBs

G4hvVewjyA9zLbXZxkGL0lbh0w176RrcZpTYTxldvIFqIGO/hvcMKPSb2B0qzvF7TltXIAcSuhp8tTgJ

JLHZLRumZRFK7ttnOS5rD10RwTnRB2ZDxQ0Kc5qFuR
0/SEJDAC1J6JidcH5yCuwvv83RnV2F93rwWj3Vv8G1G1HWOYmkRI3tg3AO9IUgbH8ohUtpE3EfSsJ9tW

MRuo4bRB5F8sZZGT5RqnDeYPoSaoDHDJXwGTqWCcLW+WjRgm5f8LhPMp0CajyT1+nhGoIqmWG2DZ0n0w

cBQiYp3v5QWv4gAxZFbawSRkrzxPSXeDmQ79vdhKw0
aaplXCpEch2PODOXWVPMgI1r/vVtvn8MrpeJj7CfzgHiznt90LPErxvKa0xndm9Rsth3/+GxzuN/kctx

oXuAUyr9xZkRYzAqhpPiNPiHgoYKHw041OplEQoj+/1M6JJg8obZ9dd/ooR3VgWuG5NObsKCMRiX1VbH

O2fh9efiUSWjSMXzuFnxklH8fshyJ3Y0ioFzKbzl0Z
ZeE3PydvDdjb+uCzK28In2yEO0pmTaGrO42mxDVqcPtuP9ccE50Tx7ZJE8DKtOLQKjoDmqqGPsmOGABM

f3c/fr6cWqQWZwlvORB1ofI5b2aewdp/aD7PqyZpGh/yy2OyPzfY1a0eQYpmWoOhRU1NtCTaE6SqKeWs

gu+ahdUFgXDUb1m3XtgSXEcbPGTMbgQaFXm3M3riRe
JhBVNXY3oFTZ/1J8jIu6ufX5id6oEidFvmFqgnagRJ9jsaFKoo6wSv8TwTlDV6MJJKX66mssTzSJVw4G

nnBbZ+5ZYR0kUThu5NgIil04qs+oGtvTczjjgWKv30qDPf+lqUbgXsH3X/GHWOhRQMrULhcecnFN4Cy3

HaBC0iUxiQpbg92H82mczz9pESYf9W6WFkzH6e5fNC
zqHThXIJRYvXeF5eq3tXhsiXCQX18oiXKiTWjicU0Sc6FePriptaSuQLux0MzhAnS660DcGGT2BP+zdb

3RjWU9sOqlkiBETZVequTjsO/fKjt9Rmp1ovV0zg9A6/ni8Mp3ziqEs+CcM5R4ca903wmx3fCGdLmVkw

WcIHBmQNnWOo3aykHsSYq4IYE/BXrhZ3/CBIAr6jtI
0+nfuYulbYxsP+LpWSBp9+RZRvyJh9fj2AOUOZUkj81XlXg0j2n9x/WcvVjYRrTcFNM4DecD4Q8RnYBr

E+7DeYbpx8j6dCVK3aZRofIir/jcNsocRZo+2CAWHN8scWuHfhMHSmR63sBJrWLgn3B2mYDkd67t7v+f

uULvsjZX4/waxu3zqBt0zY/A2V4A7nEVp0jRfJcI2Z
Mrn3dBadq3vSx3moGa3ck4PBmSheqFsox7H7egPiMEFzeNOFCKKumyai4NyJRda4owu1SKjbg0aGJlc/

Nz7nVaDFWjTNq5uJ6QSd51om+stgBH+Yq8sOqxN4+IK8lMiVnDAMyREUO4BCuxVQWEGmyXnDEpL1KZQd

Na09K2RFTFCki0vVXFuIh5lpapLduRitUIi3RNI2G2
OkwLCPBO66/Pv9JykNmH+uWCxFv0FSoTl0g2gANAVivr6r0cMuazlTwmOnna4Cju5xC0PVq3NvYE5XPU

qQgmXZ7eUfV0AFFrlMqX2nMxeG2BW6RFkmnRbhm/1dNjgCJ5V29JtFj4kOaazcAuB+twD+OQ9xdgxPTe

mZdTYAfTtK2cZJKdK0XdL2RFx99UWWt2bL3aumTDLK
nzyXXu5id5ZszqiEgZ1rPm+53O9+PCTVmIbj6moEqwWtMxr6JKbUmfVav7hsxT7/puVaCInN/rkzaoGW

X5lAr5yqUPP6UG0QRwQGa7k/sJyDw5Dyc5yot62hnluG/Nnf36AYpD4jW7dTuhzbMZ+8z2sWtgS/fMKy

EXw5NDsRP1xPVatRUM7KwarhBUiGL5YTR/VWAjXXeS
uTw9dHE3XIjz5vhWpsf8oDzLdby3vER1ozn/LyN0enoCMfOChTHNd8uKr3nLC3eG68X7ZbSJy2gy8G1g

Ccj2XgUi/gElLSBUwGpwfaSoWwp5P/o42IlUfA62cl3rrC4zLXsQJFOYpbTPCpNsbu0QedeWyriBP+Sv

J5ceFg85Er+iEcS469HNmYLfqA1mrTdodAvWN1+LIe
u/oO/taGqSVbNAVy4J9kMeYm5E23GB4E6G/v6rCx1u6ZSfsotYNQ5T8YRAJ21VXKJ4ONJDM09/AaWSnx

TCeCbaEiwp3P0Nrd/rllsEw3/YdxUJWiBq/lmRygWJiYaeOR3Qr0DN2tQ4BhnZ3sKyAuMzEVkNeai6/7

k46gaqmkmaxZLPJ6bf1Yce6Zt8VdjZELkfeMNEkzVo
39p2kxMqo9Nv+L5ajhiHq/Nji8/wDNoEI4dVsD4kX6rzuB+EZPicYDfUdNwQljG3Gpee8czYZTpKcdXB

tvdomlkuwB/BS0Y7GCMxrDvh4F7Qi9ZeKflTUrhdkEjdzJypCjT4JMzWLeDavnRbnHPG385Z1LzUSW0s

ZLDSCIexO4T3JdL9GIQAecn/clK2vvfp2FKZhRcGVs
8P5mxI0CcmveS3/JEkYfSfo18QvNj+Nk+oX4NjVi+S5shX7YsZp2F8xxXnbNq5LEufTAmpcz3zB++gZO

Nv3cldael8ed7C3RB4vAIVralXzMw9sWHXT90TPQK8t7sprKbIzFF9TMkIrxJjEUS9aT1QvcsRHLESqw

ErJ0Ym9EqclC6PcbNzW8STjHfWOjZA/dFm3SR4o5IL
KPmdnYnHScnaxErKSc7f/11S1/uRStMmbCvv6UNwKG/GZzf9iKGvCWuTv2m1oCaWy9uw/o/6oRAod9HB

x04N+nCOaTKHBEkvpPVm43P0sOhFGNGeVxMHoFA5mLuE1oZZVNPaZ7ixoWVvnU4Arsv1t3hY1mrkUMPX

nxezy4GY7r5a2l9segjZwad52e8wrJJSJCmhzK5/Tf
HfvCoqk/k2XnUq5J6/QnGpksUT91OSdCkoMvP+gcK/UeoBIlszB+XrTzcioebXFo4LREt1Qb0kQ+Bbs1

CaucaW7Y/791scO/Q7nEd3gTz0kpzKfXjAduHPrujVfRDA+S8NjW1aRyZeC3D5y6Hi8oJ0bb94TVYZ0L

YNiW7pJwlddkRY+36V4N7zaJYNmi15PLZ8A7nkdFs9
/GN29L+YKg0qKaEk7cOVG01e1HGeZ0JpYHYAZyelo6jqTB1SJaahaenQ+aI4WXf40TAjh9IASVp6d8wV

WsO4u9lePIQsRr8hqGDdWs531Yst5PaebDcZ7SuGC31ZzcEFFrsCYRSM5QSdDaJhTduscel4hVvs7WRi

tceZKfevgHOp3ntz79SU53Od4KrpgKIknUBe3a2c4x
TODljHGzHnGfXm4+nnOk9R0s2AsoQmakIHYZsN51KeOX4yU2rQAzPppH96RLryBDcv8cbqV3QY6OKKRr

/iPB//gG0xlvHe3JAIFQxRv0YhjIGy04OWA9f1R2z4+Rn9MTgiGDexaDCXPdOlzb6Vg0X0pigaq8SjRJ

Evo7VKqrc3hc3y4TzyMLgufrti8Y/jQMkKS+s/ceQP
wkCH2Xgdkok13dYR4dhvQi9JPyongD3xSYRC4n1A7/du4nqtpH4/0CNaZ5+DrBWjjEAJ5BMaR8OCdO7c

RForgh8E/3a4/HMK/CIj9Lo9PsBXhkXEPYeuVapIxEI/RSHHJx/R8o3UYTej0sy1y2xMngnvrfNaegOE

dinMqFNbB5ADOTen81YM2qYrNLocNPmx4QNkbJehR9
QoDbYUcoxQmZQeB/e16R2GnyRfT1HrUG338p8vmOeF8ZfPnyAHk/2SnK5m5f+uFsO4JGU9o9wVoqR7oW

kZOvNNnWU+PQNAS/HdOeR3dh5XPSUEyuKMqBoFuNd9IP8ayXaMeeIe6X04eeGLpNtKh6Se+qpyAeNCTc

oGzAQZdEjAUsbzHrEXdzXpEvv3F+ITyj+a9KztkKB9
sJdn/19nAznJFMwN9MILtIlPOSHvLQdKCiNxi7tOcu9fJP1CATy9T3WSgarCbtKOBhu14f81ixUBbFOw

zBO5Ko02lWp2qba98zh19ApPJE06eerKYeWZ+mzlM6Bgs4fLD33xQhKeRF2hXTkFi0/5aixz2nB++6qS

iUNpM0En1dFL48E1U/maKYhf4ROq873/kLk9Ltj7NK
5UTBvHbF50TI2g3rjReAPGQFXiQuTWpCyppcPTN+tjKqck8/jR1rIlOWxAZ3qM9sVAemdgSXH89Vvzjn

qFGKyorFCR7ZtA/mMoi2QYEwkIBj9pKtDMUQ0r80KlX35xvLrL/dzgo5CoivebQS/fKDOzgS09hwNFXM

vprBSEyi6R4KbEbI6ySEVVhytDvXtj7I5lvc+PSsWo
EHg+AX8b+WBTxWfjk7gw75bvmgiHi3YYjimyOBcYc4KFYhNUJqqyCGaBzxuwAK8cV9O5x+eqn2t28x3s

A28Qke3L0A1aKN84Ph/YQQAik+7F9P6Az3je3xD9ZNAgFrpWJMau0ocLdu4R1tNDEyBz7/Q9lhqWi0HQ

hDNHJpJVKOEQtPnc9ET1pDdXhrQn4A1+WM/ZWw9KlT
sVg4NXIql3tpid+87dNJkUGIcHzM12G3+s9z10bo4YBUf2EacTkweyLeM09SDRh4hkH/XU31qV2VeyL2

aeIIVzo9U92fbevtXunxwrqfci6ZDIg+sf+8U4gd5MpiScGeQZ91UBy7z85Ct9+0aTgWYyLUJlmfFbXO

rp6QtHMnF269qkHOsg8PbrIM3qv2Qr5za3ShbEYCog
YL9ahBH/fAP2mVoFknruo4fMi/57IoCr335pI7Wv814E0Nr/CaPJMWbhwtQLULMkHzSw72BOZvIOka2l

IpjYE+LLkl9JPq1RJU9y5X80XUUBvOtWvPR12a5ikL0JMkDt3rLV8tPU5HJNBJDjW7Ung3Xu1S1Ys3iF

fk7PMD0oKnPeLttq1MFvcEBm2X3fJDkC4AuTBLAmB5
uzefZkPy1xR2YwPTYtmMmJw0ZNPste8TMvghVrnUHuSpiQAbAGYpXNPX++XvJb5+rSq78SCGPHcUmK3U

jUrbz+ItEyBx4lirdZ8pnQLLo6RLlI51jfD5hru4CTgdD4ByMptqxisRdUpYrmzgOjv0L/KzirbkDFkw

y6YmmoVWQM0rTYf8HoeQX6DGR1Y1Oln6yLUg9Q6t2T
raZu5rbB6LJmrJk0m2HuvPq0K1ApfThVINgwMOd7MSdW/Otvq9CgGsllcpxxL4YI/I+5VK3PzDTXatyi

nipk9T6YFENpjDtEQRSG9hauULO30/J/DaZQEsAnqbUSl3ZbrHF2PvH8jTKBW0JN/8lBKiIr4H403cf0

F+GB11rpixXPG8gAQkrt+Z54sk9sok2IeR80jct5AF
oi1MrmR2Jeva1/iRcNACgu3s8SLzWdmZLT+4+T+UMhb3VEYHuP53udWljP7N1SWIWt+mH3aw0NhzvO34

ZaK3UDj8SfS/ipABFvQcCqreJi/TmLorkaYwwWqBN7IVux69wuJnLfKDyVNq1Hk8Ij7aYFYxhj3eocBx

AxmfhTiNoR/sUC3L2QXHNe3ztviwWRqqZEDer1I9nw
u3ft+G7d5Cm44RS8ryvdOkonulmumbKBGrr/BZeAYDsoPrqG4LYS88fMTeyAVv7ng8rYS1MqPz83ljMg

JowauoNsaET+xp5TmA5IrTX0vagrqZ5bB0hbxENsEeDd+brh+VGDJR2kJQG/TrcWmj6clpDCa2BV/85i

qcML6sh3ABiTtg67e4rTnaawCQOe/a6mCVdTseBNiC
Na+sEse4wfCCXtsH7tOX4NYO1e71tJkloJPO8vaKIGjA5iXxv98p4BHi6giBWd5Rsu8iGTu9F6qp19lL

df89Ld0O9mujH5Kzp7D1DrsuwsHhKnVT3lh89GjxRY0tS0eHYtUM5VCRF/4fO+WmPKFkogI8w/8UwDel

+NjAxrPqI6Z1AE7CenG0omIcc/TU2KU90zhuMSmYQx
mEGVIGj5HhW/ImWRPzRwg22E5LEBtNAdTAZH+JSUVhvPtcOcfU4/qD7yhM64POd7opdRml8WH+wpphg6

h1mRWByRlG3JXnKMnnz0qpmhoXnYBEssXWEJIMCo3ydHHeI6LyM+elqI9wVJFOjopmhIZTaMRuudGXCb

IwcNpnNzKTq5dPNYcyS6U3i81OBFOMwndA20NwDLJA
XZoVu9ftExroqmmE2M2xQO4tXklk5ZWKqIgCcd31SmUARAZF5IEaRnh7fqg+mv8mMcgLAMDyCmy+1lLj

5VKliCdNFzpEXwQkCWBi2XeNM4S0Xsmh8P3N077WuE1eCnOC30x7g2ACLEO7pM7nisgnf+d+6qFD3xe5

27Xhe1u6eCezomboTZTibUVqhjepturMcyzEl+swFq
hVQM5wdog/P9NI/wkx/Dt8kJ0pPnWj1vTXEe+9W7a5PRzCEog1QitLYsSRE8ef7MhC1zy5npW7/V+JP4

zG6esc/vFvCn/Dsod7Xq+sokmRmw2Kt2OJTop7uCHJU526zzCMKyfRbfZpBOd+NAlFN+Ut23bFpzaCNh

wOKcLA+bK3x8PyVAddwpeiIgUeTxIPL2SmHxuCEqpR
wPwcIRsizjn9RVFbjxnKZowNpE0lwMWuSTASyaoutz0UzxlIZE5szE/baBibzhmUGHal4pv7aeB5iDZi

P+avSyNdsSS6iNfPjHuPE//s+d1bWizK7G/a+UlFOPLUw263Oq+d7nmFv41GzIn1FQ43yzK+qvyzL/bE

CzAQUFGRqwrm2T6jIHN48BxLacRbIhRfNAe0YE1Abx
5CmNt2BJBHUfXWMJmF/Udbi309S6ZinG0fRAlwfsUcxXyVj++r0H3R3dDvpzEGDoNz8Z+8LnVqVYd3GO

5v7Ugn5b3qlbN3jN7wBuClbdFmT9WaFiZh0nEAq3/PaZR+QwUnAyjUiQrxaS74tlluhvvyKIzsjJ+zaT

iUUPM65CH+kupP56LFswGOrQ7hEA7kyTAyPKDgVaC+
mqgeA7fSZJOnuNNqKQjwO6Xqs8WBI2Di+2dlKr6ZFDp+as6HTeZKKz7OHeUK06+hY020dayyg+w+ri2M

zDRTgiagisE2eg0e8+KQ/cyaHB8TRWjZqJZlfNgtThrhj7EntbvNYaNRn7jfE4PlJVDCreHAUJdHp6j8

citp02K2Jl8pmjA9RMISkNzBrTaLFjo7cS+eJaZAmT
rcRN+cJyfxOA1NXNhknvhkeRXvYDQJnKQQS6Hw3SWrs6JuGzLbGg/P6nMLnRCq0ZfEBulWZvryhSApUd

vNem4Au+LAVSh+ZxWE1P3BQJNf87UOYYtryihzvIDAWOHi9zew61+Zl0k8XZKNbzwijLVV6qMoxIaYKh

2rGQv5t987NZSxwXaZCPkrYU+j5LA19FFKOGKEmHXG
21Du8Kmt8kjQxDH8IIzo52+iOJhuwOZNYQymPQ20vOESejUFtqihJR0t6bf+Y3zyONQ0cY69fu6F9lJW

b8mLsGBQsyxd/dbeVzExUHr0n+g0OhgAjLnjSdcvBraqM1cXSIhN4Ng2oWM2XteC4XDe07v8hANXhCMG

Kirk/8SzotJp1csjb6Wd7sEpQpwusrQCDJM2uM/mX+N
J0E04mwllktTJEQQ2a+EGMxx1HbGo+UplSaFKjKieGE9/08G3/QrTVoyMUfaedpI71hRl0pM4BmDGSmd

T2yjYPaOIPhEusozThRRmuwA9OhFc+yM8Mj10TdZTI6I7q7Qe1oX+5Bph7OwQ43k5GqsG8wP7s/0SQAk

3Yp1jq7mPbo0JSLiQfhPS+S4IYwfWqW06LImhyQr4T
54pYrpIPXUCVJ0t6lz6WoKwDmINcxCYUcAOw2QXvNDe3KbU/TyE2HknXLSZLcgi8yR8MDtmLHjTYt2t1

yyT75ak8Yb4nb+O24umPCrmpkfFwJJrkVvtdCmO8WiBu/YxvS4OP/KK89RGtr2ITjfuJ/LXeBNS+xa1d

bMc0Yr7bYs7+vZ89RgrvsPAKQYCEK7/qG+IPlQlxH0
i8/kEj/pn2BTBBNZPQYLeOgIvB5hGXL4hve3juIMYJWSXPf8n3CDasQQYBRvl7ZIEgxA+s91LkBQGLsA

r2UcqP55jcutE0f/gbpArM+eKV+XmmEj8RxUqKG+IIKgwJYVisirZGfeI8oaPVu0QZPh3lXGR/A/tP/0

/Xt1MtWoOcFUDzNZsM1V/7BjiwOIYo7biXm3ttI0ft
r2fasnYuTbvYJCEe/ny0b8VECDR+ZU3yyEpwGTfwscu54cG0i8F/C4EoP8zgdeg0jL2dP72otPTsd459

xxWILjFWiBxTvepL3QFjTeQPvylZon7Qxk5cPlocZsc3amVibCpnIrusmYUdoj7T/iL0odKpplbEuFCl

GDGuvP4J4PEuymM7NUsLqG0WIoB3pRq/tn4V29F/pQ
H5WG5RjdeJkyVV2Z9zt1RBgGrIBjNDtNWoUD6kj6Eyl8tsMh+iuV+2296dliuepOltMI0PCyPalxDImT

MyeXDA/u8tkA4EsE3T88v1Sz2sqRpB+s3ddBvIGS+ew2UpB0wW/c4m12DsLF7l05HTLeKy8kIeHf8tFO

oia1GtLbNQaIYaPc70CQcoQgOoRKdALWzg9/P0ZuT0
S9hezsl0Ia0HCnhg+McCzUIzhxTHkrHXmljxn7+BenwRp8nkZ14kCWqWpFss18suFDjFfJCOBhB8nh8w

RGmRGuCOa24BxmmNubS8xu1bfh6wr3j+vceWqRrsNT48wU5TNGx+cjkvWBZf3cePlWkoQiqW/yvUcuxa

yad7RIbzXH+7bXa1iuldzGKOJnELnOUO14xWIIhs3Y
x2zeEEv3MgnhLxBCM/KxpKKAEuHeYeWb0RVAKx+uBrn9i7doHpVoyyU2bHWtulTCK+xu6bs7PfsG/6+T

UhoCZyY99gFOycscrW8QHEFZ05NVQCdqlmm8NhYnmxYqPgGo0v+sdnuWiUxqs9XkUH2z0CTiXIqlQIAh

2DhzUeJFKJe5Z9AYi1xKBubdeZT3Tkzo+/mHQIMEaD
I99mwiP3Kb0FbNDtt9Z9xvvNGyKeVwxNmNnkCLx47TL7DTMtOdL298OYZ5yxL+Mo3QUb4InJgwgpCJ2j

ue7HqOgUg75+FZxwQN1Q30Dr7hN10AqLlEuwuwKsz6PDWt1/sbt30yTMsjzxQWXPM8YEXuh3yTeQt/7a

Ctwb5GoGOkOY40rs+gdYfkcfkD392jk/bVMskK/baZ
XUscXZZlSd0ZFkqtLrdLQQ4n6/lmVfQCNd9ysT6rJ4vH0o3MMOWVO/vYymbsPCyQgTAeVUFtCe1tM5QF

EmYiZUMzZZaEDonemMKpISw1PrMtOFFVBDBjbLJUFgMmFX2fuSiZ5izKQYYZCq4oaVnsv6Dmbt8Evz5d

Irqs825CoZkcSUxPS/ZEzqVxoWHg55SA4Tx7PZIhxF
wD5E/iut9+WiIVf/+u69pdPjKlsMjAsyPr+PR7EuWJetUAEEy1r/F/8E6L8VEkwx3aVCUCZb2X8QKRzO

zs9m3n9e7e6f2AmwIgHhymYhCCeUNrOSF7pmBL5NjSN6TfKP0TLrXkm+f9Dy7df8pry9l39RWN4LErom

7yGvCRJWtGYhcyhQT3wbk8PDgRjOvs5AsgqeYuYJHl
7wgKjuBLNtXLBaQIkRWCa1QUnxya244QzxIy7l3G10Wg7xdS9DzxONwCBIJiTcJ7LBjYZ30gLD7KVsXd

mq8RnjXfC1H9UMMMOsaad6WdzVO4xuGdyAdnXPRnv0C3wzm0Jmbk8Lk8jl9aWumn0fhMC9r0jTJeA1xz

JYLkGTEZD7hGqzwBjC0JshvRizxTQPKMPl+hwp8AoC
9V3ActYtNk6kJmT0l4rk9kG9GkHlQhdHlKaaGzbZI8a04LES4omYMz+cexA1w15dgGsIZ+qEzgVa3PR0

+a25x1gL0eZ+LHHY/UVsdJXZBirUv02rdlwVHwcWK7GbBZJX8xeWK+ZYr7AiaNUcyAp470/OtpBhs6ai

6OxvMBePyTlJHZ0y2kq4En7dZsHi5zKGbe4CEz6rY+
edulAyuSDepEYrlLwAAZTZ/zPjkVtGoYp9hL4vG1zkE3UUOhiuOpA3XrwqlWvXL1vXM5wnp3tsJFUy7D

ysEY9Oip863dxcc1SFC1gz5xLtNcS1rbtOoF7rYKSxyHGWxEMRlG4HJyoqVJIb1Fuf6N+WTk2CeGOIPh

oWG5G4FEnXG1a+UVlU72lTDMLArW7hpBJENA9He0Ph
1oLpSYriaP7JnZoxRztCyxMhsoWEaZQTAT+5fNcrgvKB9Nyohhgg1hxkOWbh5ZIqaueQsd4mzjK68YwR

OcIcCcvFGqZTKnSIajwYfdW4Umr1/x9ZuhQE3hmcSkYEhyeD4VzaTMLSiNcI+Ns91Qeh8bDjh4O208vz

gzLek4lCASfCPeRsYhD3/oyqgiwnR0rsxkuNHD5JYj
/vARpRJ2+kgg6aQKA7sgRBLrXUKu0wXcRhDIPpRqntGqAGOm6JYu2nzVoJxlWvzPvmzCf2Mm0O1Cq7rh

nIKF+/sF0p9LdePv1eXnCn4/GK0C9CdchuhhuuSuycP7AhFRwfJhmFdSPrLqd8xahRAq+qi+H3qHWvLP

lYZ8BCS1uvIYmJGz8NchRPXpVNQ8uxJ69y426kh6AK
7f7xKYiP8/KN6laV9eDm/AIpzaop5N+bhIHWEDA+/LE4TFtOKngDPkhvv6+Y7jhZKfbFpswY8hGK1YGy

Gmcyc/gmmRGo20dw3OFXOkuWA6/ou2WR3cMi5XnBEb7tckau6mGZyE3tkPG5ccNxjXbl/Navin+6oplZ3F

qMRcCcT6tPkRswtPxO+MIupk9bzUG9jMFk5BCocCHE
Bbbp5O2VkT/K9h04bufxnxtmjdU9sS7+3YEJHgSM4HkBpQpibAm9NtMXWgAMmvbjHb8yTn4YxefOVF1d

h1mfcmuQEZCvJv9+yDa7tDvRxQVm2vYI3yVMYPD+XEDJ4HbcNDDFO+X27zkDCnLUPmIOyLiLqqKo4nQp

+zMUk0yAzi35ErW/EKJPJoCp/0pob6mNSvh8Trqio+
9A//CcVZPLsw0C+pMOPKPQ8S8YBIKWKb2hskuOx8/XZJmVKWnFKmT2Ss2T2mo6hPOFWdKACJ5aWM2nsX

xoDxw6bF2SGiJF5nnEBhe5KMmhVVYy6yP2/2tOW/fefVRr+yd9M/vw5EQDw3MiwCLsuEr+/H1i59w4ji

tI0IjnEXGvSGSJO88sYWhFuDMsOhSdeyDe2RsYb2sS
SBm+MK8tYE2JlFmj/Scixcg9jDJjPrrT64ekpxXdkw+0uOv/Dmrhg6EhNQyhRpsQAWNBOlIUXsOUYAIK

ijmc1b8Wlnsnfz0qFWvUfeyr4zpaFhPSyU+czq0BROD4JGGbqksUFC9ZgPn9qD5mq8LSP/07c5kDw2yn

X+gBdef10+cSQIN6PlxY6kXnGjofnZZbtAvfMnraQ9
hpS1ZvetoQO1kSLs+EOveobQ57wOLE3b0axzEi97R5GpBt2qfszy1nTvsoWo5cLBFPNpwlaifXH3Nz7v

tdL4fbDwG9Wcvib0DPaF6CPndXmLVfCW6MF+ENdPasxe/26Uv4AtYM7Fh+WAOsP/oyd1czR1178JyzzF

79x8cVDKDlGStD+EeFfl2OsDx73FZ0dvFhPaizlo3U
izH8RObYqGhXBVc5ozh5epJnvj+iSeoUjduoLz74+fMfH/9SH4HBqs70SbUxqef7/1YJ2BbkVBlfwoZe

nQoD0ZA6MJSc5zlpgeDnvCu22ERAxbTjFaMOb/SDNO4jRR9OuDFfDxUCU4+jd9G2pgb60+MPumxfpUlV

i+32IxWOrwdj2NN3kiY00I2PYpxWN9UDUFR4flnE/v
+zPQo4g+yTBZCKwfDxhQWnshINLlnLGewFkbvM2PcjnpF+lBZHP0YTCNHBz8DvldvI+3cZx9NryVhx0H

8LGsmzGZnItDS0nAvy+hgyopcwapUlSKZz4z4/qbqJTmSoyiwaScMpL+hVIBbhFgUP6GOBKues/JGW/K

OvpoiTk+7iNB2s5lfmsqkeI54JrNcRUhyj/X+KWBQi
1zUhaJcQI1O8oJ5qJMRUdQ2gJ796exhUcD58QvAsJ5EscLofWZfqDUy1H91yyf7tfbXLCmQ3+HxllEl4

nt4yWP00CLpBaqReYftNyfHxQrU6WGXZEBkT+slxw3syLgnImucCt0LyfYvoA/iHCZIXLv8xgbm+rFVL

9OSr6aP/sG3ClFeljxhr5WruMMrdsSL8h5JcJrf4jB
3ATeq2Jxgy37YpfnX1vb9am7vO15S/Lena+r2GwVRDnWMYUxZ9gxNuAe33ZIqmfn2x2ZS+zIkVO0ZQ87O

9fFWP0mI1K/MsQ1VmToM1OUMvFKshxnRf3x0AucZN2jy1H7V/BpwwBv6mmzxjnOUO+CIgaCTkHWiIozf

2qOcLZVP+rOk4AGcXIDUgTk49WXGbCNPSzRzP6W7pm
RSjezXfOpDNwuIyyaGtXgE4jDkA9UfJzmfig0YkKLZZIVvtxtLxmwZ/XSkbDxG3mCfH17WlMRkMYxzZp

0a9wJwCqyF/NJNkt7Egv9r3+WPcCHCbykXhMQm8LwxtJwyVPKqrhR33Icr8co46uty+2YXo62iiqusYc

7zxQv6N+LUQjvnIW4Q6e8Ea04ifPTRndcAE6gia0Hc
WR8fJ51/XfGSUdvbn29J61YVgAj+7Hl+mX9JqhSaF72VttjXdQACvDVHB+NwHb7+vb3w2JWg2Og0Tc2b

P+yKgC98OxA0lHwJbdiApecAa0G7S/57m3I13L+3NtdNpwHmAsQDq8y7W27q1q+LmIGJ2G/7e4zVBz5O

oOTS8Ah//6aC/xtPm8/5yQNIHCwCh0s+cf164Raz62
PUiJv81JWfrW151pr1HnZaE5/HIKmD3RFpBWgTay7jDayn+hl7QNgDXsIzGXPyK6j6JYp4BiSub4jG6H

7bbxdQOKr2ONtLTGrTCStN6JePr7N3MTtLKWwUrZ7OMK7OscX5MyevxSFX5pdWzo4kq1tFaRqwbhMXLs

c9yf5vrGjwXUJNkPyMPK1RWx5JdJ31iRGQcTg6WPX/
se/TxOvWugjE11qi7aLjDRqLK5U5AS/XnsdqZcM6t0YYN20uofTFRtu4cOYhbE7SQMyssLwpMeFFxjqt

Hxv8JoaZUIwiod5byUoLZ8jaH7Q6E+Lj9RYLWXjOzvMupfI8jIEg9CeW1farIA36V3GWA8kMiBGfoi8s

4nUHFcGa+5IhUhG86EiRxIL8cl7qg6T7vDzjgvsaRN
nlbcWPBXE1dJtOjX5S14R281tf9ZH8HyBBeTUzCKvAQ2MhMLYIDxsBCa8ewBgzD+RnyKAFfbTirVbP05

01UbTmBMgydlBm3dLWdjwkFg5iGXLaZ9EsZOWyuYKQGs0EbYjtamutFOBLABwZvEXr3G26q6AhlZ6zv4

Aek//F3Ft/RRX+/z3tdAmQYz3ALMj8efUMlqQZJm9A
z8t9mDFUJTRFjsJ4rASQSMYQIFPy9IzBo+43y8Xy7e72eiVVS5z+Tc8fa470I68e5712MHmPNsxXPBQB

D97SNbK1ZaNUNfXjKEeJAN5u3h09crpuKRR5giZe03DwTeZ60FWV5Z3f24Q4SPqbSwl8IGZXL3rIxagb

EBJPFNe9SZqtTte7WaBlT16hdZTSxUeTXLTS5Wrqb/
ZAQeAMS1rX0tHqNb2thUSnxUH93kWTa0rm4YIXKaF6WC0WtsWnPuodfP2Hk5Cs7Sc3CcLYEBeeKYAImJ

MJx+gEORo9ozYZd3pDckR3TiC4AZt+iioPcOD93+hdJ40VVkFGwSlAIGQLHpTj4JHry3hjuLAk/vt8B1

XES9rhKyY0E0E67Dfl00CrA7rk66NrFCVZF+KNve2U
0oryYP1O8869CHxAkA9daFrs4dAku8mYFAGL4ss7MIhY5FAdgIBzuMqJ6LXcSToRgNXPJqhrmHeMj2Tm

vLEHgAhu9eu/qyPRpuIYOFeEem4cGRZiaE98HIpHs3viCy0Wi5k8jlypxiJpr+F4iKHZP0mGJ9QJJynP

TTgfkYfYB2vFbb8Ph2sLjt/flrubrA5Iv+jNLd2A7G
6+xacXDZXc7TxN5wN8oEpdrl79BUB1uOMD3WVOqbYTRJpDzkp8ChLl40FiFzXicClGv5Q9NdMpCJ981O

V0Ls9quWhh7XY4j0z7zkNotKW6wz2bzol+5IlLlURylyj6iEqWiujTDU6q83u7hTWqXBhlQu+znirTdG

9oJ0ePpyiYhXxLi2jR5AgtJRU5wqR+tRepNYQOHXP+
uxQ1rVd5AsO1wNsO9645kddxbwPmExReiPqkgrn69HyrKZaYO4+i985WzL8Ta9Tqtytrf0baAcm7QuIb

DMq4l3y/Jmn5tqc6FK2oTNBFbDuajr/dVIc9hxJPrKo7cDyOOYoF8jJD0xgjNX0cc3VolJ/1FZ75BdDH

ky0fxgWydw9zaSFY+ADvjm21yyc22yi66GNbQemIZf
xIKxETj3x8Lu1XW+klhAciJ9lC8b8mEoS7lnSj46fDLP9dktq+d0lwppWIi78iydUKGdayPr6aX/j7Sc

4jdbppeNvhzD4LfhP+t+dW06sbxEN/b7g3SCNTI0YiGcFw+Nih8theRANsx6ijF41mwRkI45OMRO3LRd

5LoOBmKDwMyxhD2zhoYeFLzVOM1izhm7RPKioZ/tZ1
r9NnS64p3zD5M1fJN07UA+oxWtIRhIgPC412i7VoJ2zVf1d23EQk5WpOcoPdZP4qztBKDaZzoRgnCoP0

n+s9IHlhr62xP2MECf94ArGsxzGaNRoPUSI93967IxtirolcaglFZ3Jt6w5NmVyWF2MhM8CtCaadcrDx

IBGv6AQ9aRu3nelZ3vc5R6ex+GHKjH5wmmlMllaCjF
d5LWbekdqkBxcLPpSIvaKM5M0OMwrUU4R0MthCUvadErDtm88rm6DoxKNKXEWVcO6OgzT4c0q1hnc8gC

tmnDD5sOiQbzLiHXIaQP71JbmWdAvM53NRBorpPSNAg0YH9iqoLTm6EGxe6QCpHskQIalNKP6oNYq0Bl

oyFDJWoEV9gzAVrd0bt2uosaLFWvJAq27TG9gqJ87s
H/AuLPRa/9F0wkSEeouv8jreGOmMJPicGMVCgKBKCd9JR7bQzr6/O5rQnOI4hqJX0/En4ZvfB062ED6P

FV2+QhPQszkcAgf2w+tGd1Tq542PriJtV0dGHR+HlqtHHbxV0lhshFH7t3rujrzVmchRZFr0CUPgNNKn

USP3qk9gPbWUwUIqr9iqWwJ1X/G2jPs3h9nTDYwKwh
wLk7WPkyurWihy3YBduMXJG0puS0SrFusGwaS7bqD7vgm9ZYDM3m5fePZb6hLOdgOGvH0gpvowUbjxpH

HZwXf0nWJfKNTC6r5mVfwh12md78UpWKfyFTOMkYR8MAwRjlZM2e3LwikIQzxGKDNKOAkCmnqyxOnkcy

ViU6kBfe9XlhJ1Gk0bE+7LVBgv+sPQpOWxgv2tkhPE
VtQC1oqpuSGW3u9roC3QGQwB22rXUVSGL99aCXll1B/jKu3Qa4xUeO5LTrcMPhzqAMFVW6cd2weEAZVh

jUBixtMnzSVcXE+jb1+5+Kcbdakd8xUgXUdEuSyfYNW7AKSFR8BoO4z3DY6tlYSAij0WNAUrUHrn4COf

d46QctHXpKu7H2k45itPvRFZdHmGU1Px1mqJiXDXxF
JYesmjDH56BK93DFHyypXJ7DjFyLz4EIHVYGj8CwnQODYnsxcIMCGVRQBMYx6BaZK4fCW9aBoU4krJfe

jRwHALI1yuO/qfVO5qPFRna9HhMZo6TOFVyaqt/Ea/GRkpWLBk/qawLZlqDnoedbw1X+QDCDojdEqQ5F

QLkg8fdTeyRHIWUBjq1Bm29a/zNPvzMPmIyCIprpNS
8X9ryZqW8rPU58/RPUK5mEOyCgFm/s84AkJEYVo5MjWwbyEaCsvc+O9YlnEqPsxGOJGKMaMQpZWDY1c3

skOJ+gbuRi5XT6r/4h2Ms+I3AIHISECkRVO25ECeJa+4hKfKbK9wT4nU5+AVRXpzZ/8I/pgiSRuDnVpV

RmQJYiuoUVMnVN1OrTrzq6rK6VD4X2qU5Lfw+xZzcy
KLNvdJy+7spyPABicz1Rkd5Uo0xofFuRX5b1+MlFsUlJE60OGoEvA4HZ/HamW9NimnNzlKKvzzBSCwNn

6ib0GUaNVof9p54v2n4tBH3hKtT8WhcudqjkjvAS0baWiWjl+K13ItgARj1Oiymk5JJ3yDtfo74fc1uz

kKHTQ5bQyuXwy/LX5UjUfJErpX5hhjQPDGO1yqPo6w
uULp/WNfJ92ap/PFwysoKoFVx+nC9IHNH0urzEE459aWTUZqx0l4qERtwurJ8zhTpK4WyvciJFv1IaRw

3LZXHj0N9ItGS8tAdhvnAO/BcpKrZXjW04o9LBB7vFWmsgzAhCLsdYB2q39WH4ZxoClGP2THLqhrLXx3

7qMhvyeSOeFa0UnpvUrX+VTxpUTenJ+HMs5RQxcm7/
gne4TkfPfuzi7d7SrXEV++mZAO7kfnlMOS6KYeo899Pq4zV9J9ceK4oWZezK2rz51Dn4QY9NthY3456d

wYzPI6tet5BA1JaGpwkAwgJB2KOOy9xw27mk5pgdbL6I18GdeTosKCKqSJQD3180xNxXV9hUXigmHHlH

X/bkB44kMYLW2m9gSYx5TGgFDxpuut/7eKai1dbDNl
zv5n3rsPCtf705+GW28/zPrSOj4ycmDeVryHorf4TBh+FS/UvIBrhWtAbck0vn7NsN3I2rYXsyzBTEwP

V15gCq85TWslII6O3R+4fPtVFxkGCaSEA0OCdXQnWy41Y4CgMypu+FQY/FgH5vcH9WXBDGFL2B453i8m

xG2FUtONc6u8udu22Ads69ZXJxQv6qdVrhhlgkHj/T
mg/bEd+/0Nm3q74HgDpz0xcIKmeIQwpaxRxBspI+zCKksB1LCj0TvWGE3Ud5EhZseLO+IwtvgERThanT

o5W5C+vdV43YUhROarI3tphhL6FhaitWuDvXvNqZ4n9TyIFj3Zl4kaYv3ftL09In9otUX9iDbQ0yuvoH

5TX3pcGWkSiV2EvVgqdgcgg7dvq4wwP1uo1mxoVMou
Um9GXxdllIgTdwx84o7Ri/zG9XPGJgfve/ClH+CvWMM8uXJ3bIAeJ2LjMYHzRqgW3ifkwrqwCz+902gL

W44F+cjDtwC2BX4wLFjXdS4EcoN2qKDBAa0pabLCJSpp/tZWlOvk7Eb/B8A/BDHCBz7OkQMpGB6kmFRO

1oWMfjPiLzQmw5Hzp25pm9u8C/q+Gc3pyVCZJbTVlr
+cjRHZOPVN4skidofuH1AtcPeTppkP8bkQg38NpHOBmkucTVNjVKH81g+bICvYveYD92Cf3f9KdGa7mv

yGnBBAdhmKn9iN/f/W0lr9/0fzNwO8rFNO+trGvhlaQz2Zdvv5/mg1iOSqKGlCsMTQyB9EezG+feKmUR

4DCCGHkqYmwuc+nIIXsg3UxtGFUiBUnYjcwHLEEh8V
loWk2PktgvJi7kg8XcTxhUQQmAX+Qq0UcgqPQLNblZ2KMxD7iOMTpWel9gCnz2VhUndQAEBHpqAVK2gr

hiMyfHIF2I6zpWxMNPy/WiLuhcEKeyjqYeD8MYVEh79bM5XWG8IMPLfYhAWkAz+JnHNbyyVh+Q9lMADq

/+NL04TywQ/ucVVyu08BSdXVWnqx7ImazswE31vYBj
oIa9cSlRNKiDbme7nW6BrRrcmU3Dy70Ww8t40tEWMwv7AQoSJN11465j1vle0le4cVzfjdGoXEtCHxIT

K0rBS6k25KQsiI2bCpnZqsJ41Y+5g4hjcdD6banjlnI5PXpPd+Wab5WwmJuF7nccNCJdN/rzXVQBTCA2

h6ivWu1Y+wWkDNQeJ3x0m6xgu3UDFxPd2Ljf2pK7G1
TXTWZmWMehTssQNinGV8eeGR0LmCIix9MHKeTRUNas99LYdRXaVbBZzhcKULeA2P5wc0LF/Eo3Q9HpcK

Egt9gAw2LZLkaCEZC7rgM00HDfViy/4Cepmx4kOGnPf+unDjxChDU60P8O6hQ78RHTUHLsASCBtUmp7R

l1YzMc7h9rSei9dBVVaMpfaUH7Di6m7Q8qd9r2xm4n
/KS6wAc7rwKSDWbCh7hDQl6ygaCRvyk1uJ3zVsEx+4p5lrtyNMe6F6q/Ej3oj0sc0tnVMduAT+f9ZS0b

OkY/D77Aet+GWbe6msuEEioTWqJlq2EvmsS5CAFzNpqah+UKi93idz9i03qfpmxgldDIX2+WANFhTq5H

H2mn31DJsTTIOr+RTs67Bs9QA8NUKJ11tZ5+NMIxSB
dmVw39QNstwu7Oi5G1/0/0c4vWp11U4sOnjIJQSSLAY3+r/gUWw811OqRRUPwPl0Qj2gIpelQvT1IBdB

dt4Gp33RpKaDqtvlKPc4J5OIYTDAf6KSC2ADeY/kti5vXeJbEB46ixZt1+j9/kldncF7Cz0ygK9Gnpmc

Ol/D3WSezGRX1eQUgrRgSVCsmyXU+FHyb6Z1UCq8Pn
n8VSPMbCGqAoy5iCI4h9XuyGel3X2pQwjDSdZ6sTimDEbo6jABVFVIbJk2Ghz9w9H6cfZ0HRfUdvnUXd

itSfShhWyiAYZsgbIcJzS0Nj4Znqsyl9lhCIwb9CRXkI5sz4IBNXU1jwp7wXBsGkoBWoIq6cAFfB0nD6

I0qUYmoQSjx84TFWxwR8WaRG8chLGVVUNUqZuQl5Mj
iz41L+/HF57tWlVPheGkmAFUNnszaapQTcyu/+jXUen2leqGMQpcXYJ/DzHlT2vTjLmnpOeYwm6Nrrpg

2u6xwNjFBTElpiecqBQxtKc/1Rt60BStZjjM7SYgaQ1dXi9y7JPIunM0fqcRL4+nGaU/Re/kLDYsXRYc

BH/g3uC8bPcyJlAErH5jJ5b6wvkEA9o1boO7umvniz
S5ud1+fBEc+BcV2hdT9AE32iS1bHmR2Qy2mgI5AOoS/+efphRlTDmhFWJxKUcK3SOMwKVuj7orDtwbRP

NGYarqBcjw8nOhtRIGJo50y5H+bWFAWmWNN2U9kKoxksKempLmxkvs5hlccQXRbhLOby/3zY9/rofa0t

T/ym+BLXrvUE5sFRcdnLsSgX0nJm6RWjARxauOOS9q
2rroN2kGhacggmCTarojfjMljSSNxKnC+ZVtItLfi/6wWMB1jQsHzj9HoRdYn6FYn2U/m6JMlvnuPjgv

skUDV9G6AqJhoUkzGc3KlLwJmrI0Ab+DtXbVN0Htoo6Gbbe8c6zgzUXyE32+F/3+sFDwiR3nYR1vEAEm

oCUknuAszh1mnyOgaIwKBw5RHi4qcOS8a6P4r3mIsm
6GwQv6TUacuCBe3Xiulfo0i8Y0J3csCg18J9xUeyUBUowtkpe7iag+D4DjsKxIobRsIAq0xFugwHSkBg

f8ZmfTlH4/zOkv/Ma03a3Keueb9EcVH5Yyq23vfJj15c1wk61Ji0WUgTHb8PQTXJ66150aqhtF62bfmK

QMy4kC/E6KvMCnoAPPckR6aPwAVrQBucS2RgReXtPS
9CQQTaBcJCodn5Uyxsn5G5XkS//AjTb48KeGE2wb8iZAcnr0NWYUjNPXXhnjSmiMeo0mbnJRd0mD8tl0

L1exiDeSJPkevc/FGzAvKS10wG+7BLmHuDIS6LmdlmJ/Stz5bJRKPeK0IQtNGXaw1IKAYp+A/1iGd0/g

4JX46bSoEiwRJRU3H3sPVKArjvTwLtEVrbH/Ziwo3R
R7xzeue9V9EOWBJ1g8ICxKnGcPHhYicSp6kclFScnacCwOAMxBtjpqhFrM3jbmcryLglopPqFXK05pl4

/nbHrA37vewqBVB06Ve7RRgahCk3hwQC4TOFp7GM9u8sSYYqLEJejThZcfLkOUeiElHfDBbRy7e0KoaH

j5hydY8bSa2U3tNZy1NGc2nhvw1CzM+Ac8hDaKavgQ
jset+2ePR95RiP+/mgkFduiRaxAVh7CgQo6ENfWQSuLH37Mszsygw8WHonndimLTxlr+0HzzeCPHzT4M

hDbmH007PmyGfMytvI47TLdJj5PNmbS9uo+KiEc2tmaqzAur0e80U7jc6Qj7BcfmWQpR+PvMsn+jO9Sm

knhpWrbRtqpSs2SQyB2u+FjPnMvwH/fmPa8JbLMxpP
ACqRIZFpc2x8MwplDoHBef5Z7X/9mXDtwCc6Gn5AVAca5suuno/gdf7sHNu/Rcvm2JxSn/lrsT5x8e4m

vxNTj2eBoxC+ul6ET0bnbk9xpiAoy/zG0T+hCp2uNUXIZboYDU8CQ1iCy3YRIdo06vcFmTllb8EWQBs/

iLTJJCnBxp4yVYKmtBTcfjJYMKCfqGozr7iNrr3KXP
fSJvIYFY5dAZxq5OEvifCxDxVaIJZMZzkgG/qBcdQaf8x8cu1BsmVwLxV1VYkrInxRBu19KPnsgG7psC

qFBcG6rJTHxyw5m1xmC9E6m4boqZ2efBUnTeTkPwMfOcfFiWaO1AkNy9u0zNUiVWX/qBN7kfO7WRTO4h

6FcEbt9xdzP4EmiceUk1hgvrve3GlRzoaOzfU6GeiE
JwQSHZUN8h7bc0noXe5HuFgNJmAyVfMz7q4donPlbjzk7YstIZQ+KSxrrDEP6C34/bNeL5uGXxC9qJ79

npN+d72fEL801cgg+NMpYDVa6rkchx/n1miP8I/Y3rFZJreKgiHc5UyPcZKkLvp52OLRfS93LFQ1u4fU

PZLbVNkjsViN3S4GfDHTzVZ0z+U7VedTyejbvoHHpy
2eBWvXv+jkTfgJNXpVWfy2cVsxzsStRTtScG4dhXDBSkrR/q3xeGt/Fn1Gkqy13sM/tXS74kuoVyK3qK

3PNVIiNrToG3s4coEuJYeib6HGs2gbtBKRCVZvpS8tJnIPIEGby2wEyu7/wucNBWjzxEsylw7JdF/FLU

mwOMcHlmviADeIN4Bqv4zizkefBwK8EFzNzyeBvE5J
zpUNYsDJH4NYUIuvXEhIatlR63sl4XtwbAkSN+vHePjT85wDNRo2CRkn/7J3KjMivTPv5z5aie5olBJ/

DH5pzSC46agzql1R6EyNZE4FMgx2HdWr83xoADROW/9zj620PBjkv47vvYRn5LkrzDaPBngtxC8m9cqQ

8MnRSoF+FN5m0Z+lHCDglkEcGN+TTU3vAvtKJKwSYD
Y/1CU0P/8baVB4li5L6HQhzIqDVCFbDvgeauMOeZeYaqqWduE+8FoypyUSDSkfnH58cp9e2qB267zzlm

8mLtlE2msd7DRtN/Oxp0yHz+T1Sh0zYl9kuKx4/EeYsZJm3sDHZAy3TKC5v419c2Vu755WarMfdS5EoO

Dzr8jxyH6j/ZksDebQsoBITixgoqr7135iaLoRLc16
XJgjMotI6BwI160I0cB97ORToJfc+Nmx+E5ND7cs8+YA/I0ZNfMatz/3FG2pcgt5K/sypMA+CxOSu/mE

yf964Q6rEnW6nEvcB6ChavwzJt8EEZGkR5gWxUrbkKhQziuAhrTYdVyhBhkCCkk9mE5aR4NgcPjGdeUi

7coPp2aTf4gyTHmqKLK1b7JiP7qCLFffGDStvIRWBk
L70Yo/K+rYhnMF2mAEquJuOrhT7nCBS9FZq72QRIeIXEI9LrogwjJ+ehQSySd1QCGzUHbwh/FGUMHxiT

Ha4Y5sYD/79frr6TE4ys3zxRqhNs5hkQ8fmeuzdMZ7zNqjkc71MVGSrHTpyrqHW2R00oaClzmJWfqCXS

mZsd4pjEBxk664ToP2Ks8sus+VWbK0gziPzuJyZLK6
pMk3w4twoZS6CgLWUkg1CES7SkgCjHVxgV0tivZ64SrG3lgkmEnL7PYRpyN9yVBlVHSXMwboxVjR0htZ

/v+91cwJSaLA0mPlXEhB6mU23rjakAzj9AUxhrXYHi7GrfIzAIAICAc7259/gCTB4YSkR4YD6ilxxrEb

RKjq7iMuJU84a0VLQKA8YmKsBZrD+ZMRYDXYLf7mbK
KNC3Xs+8q3wmgdu5oAroUgRqLADzp1vXrriaSW6xvvx9JzBv2j30jdeAgUwScmqTyu/t+o08MbQfyxkJ

dyNGTM9LN8J108ZrQsM1lPfbcxYoJlaKRx36nk5jMq7P6Kf0yA/sMLVameMTZqYksUoItjfuDvAkLdzM

FxxXadl1NAb80xZpNmRRPCq/PJs1b87stZJ18QXDg4
2yWDITz414TrQEaFHKmDBfIwfk5edhyVWf6Gbz2o7EkGFNjmXplnTP8+Z+7gS/6HRGgHSuOARUGnO3VH

MTWjKxVJGeamK+7nPpPLe+A/Nn0FZCmOpIzSPzw5N65kBZWWgKKa6lEtfHMK/aYJvCfl7MZWciGQoopt

zHDnXocc8vegCQrr54D8xWw6rfKLTVQtbRRm1MeAAa
O5bhY7hQsiN7sOC6u+oGWzT9jZTel8eIIGmKMimz47DouAQlb+oLw1e6FKhJFHtpesS/N73KfaS7d0/8

adIqd1QBSEhMwBR8U04MBAZ2qwXazbScwXtFcDFOdvCkX7LJK7sh/HumeJElURY5THhThvfIzV97lRir

8vG+BlC1xQN/IXiW3VDdkPJBYBg0FVoRphUNaypBYE
UwaAJFUW4PHND20QV1BCdgiaiO2C8hChR+I6szBuODPLSG8jG5t2uITqz6l0Fk6xL1E3wdkYi+4IAbRj

R45+ZBKTJ7jXz3n/Hn7yM9qtqTU5NZwUBRC4bXcdAK3eFLt5GuX9r2qnPt41tJVj0YzD4jLqhbFj0iEi

Q+BBJcbHY+2NcA56p5/w9CYgWP0WHNE+1U8y+SK/jM
XLiQyaBscl7mbY+ZrST6ww+yvdw0VW2QaVdfIIiN7WLzvpFuofZ+852yJdF2ol96e8/izKmXDzO8LG1q

UydhRl7RqcqL9elNp/Vv/6v+C6z/iX15kTwH4KtYusTWPF84fZsALQofNIWcaj3Z4h7+kcAz8JO50ZU8

V+yZ8lsIcTDcgNnLBe9i96MUTVX8veQeMredI0xJPG
D3xd8h+MIhLkd+FmJXN8KTI1mlIJtUsNpaZeMSEJE8Z1Rxjd6L/xye8oFOu8DAYmzs+cqWXO7EYBeNiK

QG0+IMH3o7Am0XiDnLVYVv7ku2ohL899dhI+cHPb/WV5Da1SUeLO9RC3Fnr/C7Igwdr02MmLwSA7iE7c

NNZXr4x8ekrSL9iB6W9D5+GdMw2I7RT2sAsQDqjjFy
esM6RmOp5ehFAWlPuywqPO9Qh1Qi7jl6uU6F6Cnjz9PEi+TU5yo9suxTCqxLk26GqmqQmuMhRXANN+n+

cIB7YrJNeI6QG84BJUifzC/ElYDjOksn8x3XG8uZQn4jNHKUQm6Mfigd0yA/AMbGhSDHNkL1Ct8nGCJU

gyxaKPMHXGSeGpPIw2/GWfFE1DnikEkrnwP9wiLlOG
CQKiMA0BwFy/+R6KN16KSP7j++cW3nlT0D0BvhSfvtrucykLwao0GqLrJOLMTBTtUVIvOkWa4LR2AiAY

hpHEINhKWrecx7G3f9FIrwrmEZ6S7pnRzrG2gOzSUJU/0IK3swYcVZU0BFOGzV+w4mdIDc1cDFgENM9j

/pKCsAV1Wtibx6sHRZE4hDpL5sGxa1zqHxS9PUAt2y
1o8b4z+hbTPvf1/bbMXiSU0+ErlhLutFqyeIZ7o5ISnu3lK8eMvvzxwWylG0EAWlOYBhYkinpmLdwQlA

9+wmmQOmtInwMKgqYKFZqOEv8XKsLX6PFwYNq/VKyJ4x/equgDdg2ZzDKlh0kAXuAL8Rw/mZoqsxtOU+

dV/MCVBeRUUNMOAslCmE2byktjXd7Eyc6x434lXX4f
4zhQZ3KCYFecYRPHfaf+ZdP+tV/OMrQ0cbwElXwHZcm1QdfxEJncOzsVu1dM83RQ3iG1biPDAUYFU2Sh

BxaOXtoAVznAH74+Rzdmf7uxSa4ji8xF6GDQJRIfF2WbBSMZaqrXCTq9SttmCth5SyuB9FiW/EPZjQaJ

VWyXC5JP8F2uhMQctNLDpMN+FyIxWe2VPLHtvTR0vI
v09kmwRPqL3iFBctkPsHaXq+Y0SAHeC72I1dx2Mjpz//4zY9EPHyc1FAn+i9WsPlDAnQSSot4b2jqmfj

YHnW830qrJ47mtA+k5KDOmXcYDSBIhL0ijg2Yqv7YM2SwAQ7Z/hmVmJH3I9GwGlLGW11qSHtE9THvY/K

dZzwNoy8ZMq/kfwmYhmKTRpv/omPP0eEaRqQvvqTBt
3cmdTeEbI3eI3YvTPtlCLzYHxP/MsRYdaEt3E4Lv64OKPlDn3VZU9vL+p5g5jxDYQEO4NmBcmRJDkryZ

XtwsbiYCjET1d96r7SPcAszH8z2x3IP4Sb584/MZcyAhLr53huQiF87Ha2NvxTNOSBLYBEsBlriUH4AC

EUaiDVXq4qaLJx6FMa1Nl9h+iPGGf5yXBreQ/jtF+L
+ukkoIzoAm8FjrAFzdg7mJ58PgnVwj63yiBpRYFjm04PAHIvQ3iGU0ypNbQF2lRSFkg1rz9cPSukzo32

cl/PFumzS0jwpDl6R8SkaOdhG41srqEGgkyn1VLkCk1iMCa9yU29AO/8YPIN3LldOcNiPCA+/6CTlXqc

mElR7zMuH6lceh4s3h1qeEqBkyvu9inuQUhUBean+T
8/j9hOKOQ8Bwti1jH8CZC5fy4oBSF3C7DMH1qpavOziSCpn3q48LDnP7wi8rMadP/kKp88ZjyEWsmRhQ

yIG8BCt1XarZBq8zq8G6vKMNGPttVuNV2IirSwd8dULZIQldSuo6iBVvMEeBKxqJccBgcqEggXGSglH6

jxC18KGm9qWkt4rvfOKKrlFgD/owVl+oBOClHeyURy
KBNkFtKpWxOVHuHiTpIvjfjyVX4Xc5GKd4Odxe/4ASpbp72rVrjc3O5OjDB1o92tCl/7iKvl11aocjQS

IeIAEYrIss2lzC4YUKMF+owZVJUpv8f8V6jx3zOx/Xkk29e7t31NCpZN15fJ+jquDP1xy5bwohpveR3o

bZ5EDT02K4pyHjklVUkJ4ma0M9SRQFI6VJRfw1bwm5
1W2EEGUE7mk82aOQ6LE3xbJn0O/BtL4ZcYwGDFTu1A7LgNYMGHbD3RwVa24D9iUjOEaGuvPltcr0AVd/

Le+q0eoQv+DbqAoESYuTvqQ4pqHbdYiNGDwOxf3oetjpxvvegfQC5JFxaxTSt7lS43sxZJyrsXJmOLMM

TsGWMS2l5oXeKxqx8NK6jo1SM3X5fKjaoTaU/ZmJOt
BePHd2RYv2JcAxtIiM+0XDNyN3kLoVz+RPDI8pqYdar/bQQWeKVNCdXcYq58igY6pHJQcg3cCm0+76RH

Mc7rhk2za3iVn7f4Vjx87GLNKSJLn7CG6qltm3ZKRkyNx5BGLd+zwX8qsRlqb7+1rGMpakHvNJnBFcvA

0KdkPJFGxytVgIUZxf1eg+J8ZRKEnkvWlyXEtiQn9D
67W7aZE6tui9n2jbjX5FY3qvJv/D7nDGU1j3kMLmZqhr/+E6X18bPSoHDtngT/zMmmQBd16Uk9ftgd9t

zkd/of/1eJ17YWyJLPp/TMpcw+lxecpyLFWPtEES38mftvPGNX8Cz2ZCtEaKSt+esH4aNFt5F/kRV/qz

1C5MmizyGtthZo8eh2oPOXjbAl38v9OwlzCLwMK5r0
Jb8tZUDdVPQB+XDkDv8mOZNBl+VI+4mHiChsz1UVVwFAWoQ5c6emL6wjt9saaD+6DZnYZsPdD0J81GuR

u5km9Pi1tmqnr+kWKP0116an4KEmzD8LVx90fYKfQ54sqKsyTE6sGbC1SlIjuf9ATDxJCxA8+NSTU6k6

k41l44uAsMI5myJh0n4BgmrlMgffkkh3lIwqAhtFr9
yaQQi78ANI06Ss8SWvTcqG95SjgU45qRGplK1N8CDUUje/TTdT9K2QTE66zvZ+R3OE44a9yc45EN3ApY

m3zwcr7UHuSjNOf1JAan1lwCJsB2cOQ5AJtOKOfo9gpG2QRQv5gswwAtN3Rfvf4HHqawRoXr7r62a4zl

RX0dvQK2edglK2xphTCFgRcgeonnMf+4aaDiArGVz4
OJldQuOHIqwJENx1DQ+xgEViBR107V+rIeyEk/44vcF1SrRrj69AP2mGNr4Ftz6R4wG1X1lRlHfo/2n2

OAX0roN3iRi8Bz5u6UMe6ZHAlVgDJ0MhG8BHNbibeBvLnsx1oJ3sHLf0cRctRoatDxYYMplNpgoI+UT1

Q40BZk8iXvDuqlZD5P5X3G+jrKyhtrX7ijSBaK0nXN
YCtOIzL9xyLaNnreviWHsuM8/yDtpvPn9v0xGaZWxmUTXstTsfx14iukm3CzZv17D6LXzoTJk4BvvnRx

Pl5iNx0NQBUR1eX4rOlF3A4/juWqH79xzf05KlyG4oPMUTf23AC/Ngc2r7UImILE1b7DZTHbQj+Gv8jJ

RWYI/dDUmVDlgSOX4MvzyXy6A6j+zXDl7EQbjPHZ5Y
g8Dq2fkxNVqohiikLSmiDZwvO7klmyeBkudCgwN1zYhyc6QuC2mR780Srri12MOO6u1Psam2ZGN46gG+

H/Aand+ycjEm1dW98tsh9TZ/kiO0GR1yIKRMomHF2QQYWPZgrBcCzZP9tWjcbSLbWDbZmH8iaAn2l35s

/KUTnlltevC2q8CSwgEWtmeMqehdLP/hQieGtYVNak
z7//IP4u5wv1Dr23bctJSPPRWpc2uchV5zR/WbNuhQSyUJGmjQb0MtpQmOonpdVeSWm50hryCA1FZvUQ

gJx5P76/TAImfd/zJKu63/iVjYGJfQfUqwDPGXu/4B2JF4WM4cJX2EamA3mU+xEIiY87W5zJi7a6QnAM

uMZ4ZaXWyxnOE6gTWzSvS1Nq0davmyAxG3kdow4XER
5KC1UoiE7Oc6G4A7gkR4ro+AMKarBSd7N9UdQWX54XNiaVXQj/gKdr17jcmSCuVR1lqvchPMRb+0QvTs

XC8QKvF8LGj7ZMGTdVhG8asMEhVgbJHbrWIXOYFWuY3S2ZY1GJaJloEOqQq3kZAO9PmgCplAhCRNWR1r

Vm3dnUjQ2WW9cV0FCoLJM7sD0efsrDVRLB3SixYGWN
5mOEg0LDN8mHqVGa8c6zfPhN5lmjTEYzTkAn6kPY4Z0ffrNYih28cxU+hBDUVS9r4a2cYouLFqphCVJM

KaLwvyXAyo8jK6kIhMoZOl3tfIZQTmBgyFTU7nncAQ/h7D1j/BQLVshFuv+Ynu6Yn1RqKtmovBWaU8OU

96PzNDuuTQgeAXf+eA+H3pyun4ZjWtLXHsAfY53bzr
LiZdD6Mu3XOBTjUSfH1/uhnoFO8W5z9wjGM7S4lQzrOVAvBeIQCFVSc1aQDfdTFtqDVqfR9W2BDkwXYa

ELNkUVQw64+uEXm7/K1Bqazf6t+Ulr+A2Bw76GX6N7vfM0cp/zL8SSJiC4czvfeADJIbb5QpKEh/dgg0

mDY31OWE9piMryVuzU0ny9IbayAScsbL5uSl2JE+4t
wO+jyogHGHGQ7bmufj64uZTMu7WCR5TDmoO6xAy0VkvwndUX78Obpto9oVqyXVRB4m0ABIQrNDNE5gf5

jSn/zweEmVm/zPbBC82UmaW5B7SnGN0zVOwI4w4bC4omSkW+YhKMB0mjt/ffn70y0bqzsA2xR9eQ54fD

TFwbb+ko5eDH4uu5+EBOIHoJCfpi5v/MnWZH81by9h
zRMYERRRhqPqmYgYrUEB6hyEAgoEtvgBrEDBvTiOqVmBW7PsS89uYo+vep0/+vYbVsRB+Cyw/6J00sCJ

vSu0Fot2phf86WkOmLgrxNhmU8TG82WAri5AVBj656zQmGJoGa4AFrxh/rbhMjfavT1DEuv3mSxr3lSs

G/I2ylq1Foqi3g7gYAl7nf+rfc/w8MKL7Wtiy8LeU/
vjXejLdxiKciGVcH+w9RrFbykxCRwh+qi0XBz8p75bDZQ+IZTpi45KqKYHX5M26Qt38hLYMbjgmJMAX/

w+3jA0kItqybnJh78/5lU2ildkNuJS6nsNbT73Birb6jDsQLyl9zsb+tciEZd95S1krmPScZ8ku38ENg

qysS9EhtSpdmS47iHCvZsPiCl148aKzjxFxwgisUSd
1TJnt86H4SIG2XVDIKfO5H9b3kzDww6KeQ+5zF2oTBFxxIo59Hc49Fn5Cbzo229HFumPJ+p4MCZZTNoJ

C76LOPC9u0LZSS8JhPJbNixPshMYPvdvYdoy+cwbPYDFs4mcnj1IZmvKVuvrx34SPEFurU310s5f3456

Q7bK234OiAorrn/V3jIOS9RFiLeOiennQuzGuTEFMh
18R3M5E3soMc+eObYuDKcTIGWGTOTumgeBFw7F5plr7opUGzv+Az3iBiuvJyfdc1+lBO1UGu1LbI4bnf

VZVUNRO5zHpSRmHw+1G+r/UeS12CvByFF2mYvqmg50ZzK2ALLTmzpr9Xy3M851YiLowuN3NHoaKoNFXo

+EdjAtS3uZIZy9+1Pe1AEGktxiRSmH32BU2J6dW3cZ
qBmnF0GpdrXLv14h28PPjvg/F+CHnJ31vd8M3gIngkNN67LAksP6HsJKN5nrYcpF1PproEuvg7q7iZFr

7+XldbJzB0XgFsdRU37/a/CS4Q5LuuKn65IAvihPpOjKykuP1jJCgiFSiKlqN94+FI9zCCGRyD3P0P70

feeSJY1zXeiMwLVt7qeWO3PFjvZ8ec+j+NHkIIn0ck
Z1YK3jFqLWa+yY/rc7+bgG83bmOMmlq3j+aoXIQ05MI+fYR5iNi08B3Gp6FtxpncYTW6OiSZaTZo/cil

4xZEdYelATT2aZz5f0/r/krJi+p5NURKnQhc63lg3GKSor0cFJ/L52M1AF4Za8SEiT7Tfiga4CrMMnbJ

KSQV8RcDqiKwOQRoCOM50pZ05je2mrjw+ExwadA46a
t9b8Sl3p+IdrEA3nbb/wlviV+GDx3kVnZyTJrFEo6I0HxGZx5WbLw5hHDHYW2XcjfHlLj410IMoWFfpe

n1Snt3lWy0vnkyr4t1yKqAO9OT+xSi/93VQlqWLf+x2si7Vt467GH9Agt1mtnFgUHmXrC9KqflqrvWk1

1nyd08c71Z/dEJieSkJsTv21w5q8sJKwoMwp48Pd2Q
uSaXc+R5hOueQeU/KgT5HkkwyObYTFSG0ajJHE1OtkapWb2R+TVlnWv2u5JeLyPmn0f+hcaySCh9pHKh

sCyYrLlZZDVAoTxb52booMwvya2XjprLDDAMZiP39y26uv9+8Aqfp9h0h+i/p8ZbJZL8GMIjkZBZy+yt

HLr0uACV7q5QZcws4FDj/nb2sXyHfXi/J6iZDJZnIr
za0L86t3Jxx+BIx2VkV6K/kNZmqZNN+8MPeyG/kwGWKsQ5+GhOg724wEa30a4V/Fv/wNA2V2HObJS4uu

zSlXKW/3CGyIxJzluJt62TxzlRQK0worqGzR8oWoC7Tw9ziZYOVoSKf/IgT3k43VfJUS8wZEaNEp8bCV

PyRyE/QDdNWejTZbugETZ7w3Cd3+Msg9JX/j6kxe4Y
x2wA9KHQiQXleFAxF762ZAI/Fo9nzWHXgEgfJjJvlNG/3yUphLunDXusmM/jS5rpmYxq6w1rUMhzSzDM

J5P/+PD3zmMVQDM/Dz9ppY2eqo6wgAVBfSw4z+FOYurLtdZYswOgMjw1fqnJV0jj27VnXqxXiOEfMHnR

6PBF6f34R2TDyG9i6S7D4GCGP8iCANyqzIhf2ZPOcm
AZZscx7khPbMrf/41ERaJH86dqE/TfMPycfs4LKo5PwDqmbNqRiH18j8x9xUBU5S3SaVbEkJq1aUB/Nn

CpbnZTj6wydZlYsGzZphv/UirbaQp2zQGyVlTrKp+Zd5eife3r7KLUyXXI9qZKHQJzUZyVVUrX/9Fv40

11Om5rysV7YB4GbbYM+xN+ddCUOlwiXBNnb/whg+N/
5JfIIzg4SjPXZPrZeba2cl4YsaXennTdg2pnquqvJBMPD0U8JUgUGymPGhAhM/4QMNt3zlc+y8d6s4oU

qNHRtX+FVTDVjkVO0O6BA57InBmZ4/vMpvgunP4zxbrCdYpi4aBOYiCJ9rkYxVAskcVavJtywbkL1dYd

1qoMm8KckKdzcFTUd090D2qhe5cgZgT98vmUKlnowQ
4oaFgGFGvfbR2J2+yw7U887xKqm89S9+GBYaSLGRDxS7psjs4/u//NkzF6OtwvFp7p+xISSiAF8wDvwD

X8MW+VSVGeNnfjjnt8FbfS+sG+hXWbGCqQcK3vszh1LHBqWxJfqJIvweAlEcWBody2OZi38sTCmJS1iD

3U10oKlkMFP5pvWlC2AcCwJ1wyXm1QDq0ViVq77viN
k4JL5AhW+Oe7AfahDl0v7sTP1HqQpl86lQa3j27wXeu93gMWehavVxBvheL564Qq/Q4wOCx1p7z/gDdu

iqqr7pWidDESxCM6Butp8MHdzIPVe8cxST5faqZd7/NBXFkitvwbZcLtQJVncBKy9J+iTjYlhWxnPql/

/WSEKW1UyQOntwcm16nsUWCKdKm/ZfRLtk/P7V5/u1
7FL3OB6eu/cb/DgQVl7XWFlTYVioFUdATeyE/k0eFFM1KSo+96sTQukECf5fKFT21tEVkIQ1HdkB7Hkm

iFwnwcGz9bKfyP8stmm0woErG0rK+vnidL8VHhozOVpbRl9l/5v7shFjnXZVgSwNUEIclKrbJuC7t8M2

/HynokfVdPJCdAA0XkBtQivYDCyGz9piR6qgyg8Eh8
eGlB6o+ZgKnF9mUel+vfj3RBHLLehffxgfYBVTg7EHYFdyQ4QBmIHfECrjw738Ghy3s1IQFH1lwH4j3K

9a3/lZXNmhIDJ93IT6LpmgOcQC3d7TDxya0FZ3icGGkAN8XT1lEVdPaIZ/QMcDYAHkOCLgvmx2rrEKtV

pPrKOCRkgXFOWe2D/hxgxRS+0aPzi/bWXIPKqrsDH7
BC2u7xuJLHxuD8tbpiVO6CQyUCiAgQ4mX4KiJfhwXJSPGKlCBPk/6ReZLs1PSmRHNuKTrwIG8GXG/1Kn

+luF1IJNOiBmIbbL8ZO6baN9xGjlwhg+pucfJNs/NIeADDN1UO2YIzyt+UP8uDGZUddHBH/VBp6jxPrQ

d3ddwA3OmX2IVxvkKtZBNNHlthfwoPxj/wrlNYt9dU
d5GuIXJPiyYeoXwUJxU11zLlXAnkVCzqvlhD+OEGudreh3u6t8ZIOUR1OKnW1OW1F5GxEHv0cDH47R3H

4jXPNA+s0svNAeDbIQO+/yBw7C3N1KamnHDRUPxXv0tDnHlfRc4IQgFK0KXYbHscjCJ8d/HlOZkRQeV7

sSpsmajuEQm2UvtOWxoKjEZJaGfI3+YhWDyTPFbN3g
Yxgsq2UtbYVkDFcucugy78D920nlzrCCq+YNlUwJ5saxAFZfxt4TCA71Br8WcyHcIKYr0Bu1XPdFOxdQ

5+CrzUQOS6wtEktsLertNPLMpvWxd6Pp6zWX2ALH31fBUX1IoRyWyCjgJzeZZPsXHzWK+Tlht7v7E+sO

5VAkbaVbLQJyS0AHalUJdSc6VHO8GeDq4zr+bFM0E7
JFZFZu73OyL2sOKTJR6dgEgJX99q1U8G7RzWM4hq1MHMtzy1qV7xnQg8GrbqklF3hQCszBiimtAhPRaj

Coon+ZG9AuJbeOjnXwKDsQnwI5Ug+xUAkHJNpibxPAc80STaEvJhwygO0Ej4+SOGzw9gKIO7soFx1lpuUl

RLonfhrswjG8SGM1ivcnlFW7lBk2d8f5E52b/7mL1r
6iGkUxw50Dh7WcdyY5TsUGz8sAw8GFwr6bQJOF0qrkJH1ndxbkKNtqxzo4hCpM2w1g0bV734hRg8o5+E

bvjAFLTF2THJ0/hAK0fSjhDy69uXK7jArtTQ2uUbjqcS/Mg2CiXPK6fHS+Ks71RUFihPMHibSU5yea0f

X12w/dhcpr1DtwW0Sy03CO4Ms48cag918FwAIvqROR
ECRXwu1xcK9xvPfRI5yx0lRk3G9ByKAPu6WXkjNkOtgcxg4BmqxmjP53/47v0skNigho4+FSs01F79YJ

ZjWWZBxF00A2CsQZjfHieSBDnybj6JxM48AzYJ518WPR7+9a0b1fymVoI82zPiTYp+IV4oSv28y+oIeM

3OFsdPoS54HtxXBVfxpXTZgxf5nt1rBs9nhiFSfP/Y
1EZn10LoxIL5Foe+3JkdxiPxo8PnpH2gA9knbHQb9PCANpPbLsNSfXjJfNtWf1jZ2y/HTR//tz4XiAn4

05MSoOH0w5RZVOJsuu6DESzpPQzifyVGMl7Cz/LVVHhmthyPKzq53eL0kwPG1ytN7Q/62XZNS4vxTekI

tHaGqu7NzhUovA92VXJxgIaFLhVeBcQb5/nIv8sJfg
dtRIxmGxvJM2W56lh6iVpMSpXXWgRmTNXFZu/gh/a10beSiUYwj8X61hC/XsjuojYmukIvZlAJHYDt2A

nlQ+5nqEuWx00SKsrzZOGopTW65NbTxP6kNkU7Q9Isu/QEzLVFa/JGnb/8aIkvmFlB9s7cTBxzFkGGnR

bGpTrUVkYgLymXFeg5fJ+qaVslMsAxSkgDN7p2tvZS
h0qoiAkFrpV3DVIRsVnNhGKJ3KJq3ri2Z8mswmBdfcbCXsq9QWmNHkuybNsCJFaNFSEj5GbqQ3xPyeIf

bClOBYmYh6twTJQoLnpUFkxt1pio+OFJ2/YzW/JnIWvxzC7uqOGpOZDGCwnNrtESD93f6FRI/YBc0Fhx

ZIO2Epk4VnWdZFofHrHmc19N4MRFajA58R+SXq4Mb3
cMnDT1l1n5YGyDX6L1SkhPpR9yOOTqDsupTL3JczsgROJQNmWgTtRSBK5OpGVaBb/z+Bvgt40R1y1mIj

L4Uo1pm6Aj4l0xO+NG7dU3X6OOAluKqQPFXSuVbeWtDyFYsAcmO2vyvYafQfgEDyVkl4+q8/kURMqZx+

AvAt8CU1izVdysdBZyRXtqvTxHsz+9mUrnbLPbCQO/
bTTPeVXVNx8MIxsd/A/5uZ9JucRQvnW6cpfSkT5kD9HKd5HniGLEQ/JOSE+Ea+eNlym9R/yRinV0x0CCn

PheVrk9+Nv31mEMz+20jhsZaK82hvuPwtHs+NDZoKRsXWg2deCyG7xe5SzBqUiQz5I/Us1+ms03ZbiQV

SpkbitGQ9vrZTfJb3PKO1wCg6iE6TOb53ne8G8mEkg
+dPIsj3WJQu5h7nf0cyk1U4T37m22aQfR6+5Y9DwoXLj9aWgq+7MVfhGEp+ovJfwBXmXvtqij/HG9j9P

RShLH0gczr8kOiqcpVyAUqfVOlti9+vWNIPi04EdCVHiOMta+kQbk39zfbXqZWnnJK91nrFq1qvX06mU

gbExAw06VVm/rzspniOrlN2Ouvs27FAe3MgxdqXlXh
3ofcrzq//jrVeN0WA6Rzxih2D6OXjB6LhYQxwVCraB05lofoXa4+Tluw2a40cfmGYsdq80n/xQlraUec

J8U/g7L6k4KQrm/k/UpVBcimk5e5uAkGqK8z40kQHjNSaWoqjmNXjUrfOvPCbMC3KLFpV2jICjWtOfvb

XU2umBZZ9pdJUIj1Eds/8bOpUAh6eRe0YDyWtqKrfD
UdJ5D4Nm8Zg1IhBi4LeT/kFYUhYkBqH+HSMkXzoiBlvC5bXodi2SsRcV7VUWqqp6esRX+QYY5tCeA/t6

LK091kgmu7anle1VdfqblmbM0hWh/HArbOZQiNVu/wHk5CltT+5q9OJKc6sVeb7EChYPgHjZIoXCmEvN

U8/c+rVtDtKb4S6e4aguC+WvK76roJQN4gcv31ftIH
SRVQqaJbSl/9BolLiFU0ORE3gvdkcZ3vBsMHcLixChrmAuUPC6uhuAW0M9xyX/268fyNBnn5C2K7iu7O

G8lgzoOVEz/m2BrKRhxK07x/nw+E78J7rX6OWyKTeHCKWx3KVYxDrBn55uuMrzh1ZZPPvj8kxVqBMM5y

rfEds1u6TAw9NlvQBgJUM1gUF+kGFUVSus5NOhBz5d
sosoleEzHn2Lw68LhgqdQwPZVGJqLOX7ow6dsmRu5trJUA/sL3GMzHAkaVyibyhaB0O8NOjzM18m/Yulisa

lCM+ChDE/66dDt/Pk+v0k0yVAUiCv9HhxAyUa8xN2P2PjXAz1kno03Oew+3+O45HF9eUMldvRGrZcFDI

8C3WwrO+tmLmWCNKXUmap4TmZLH29PFkHR/iVXLI3e
0eOkA7zJe/YfTzWlMRqEgMHXzEfAIn8HFjnikWiqAbOcUcTWLmKxF1CJq1Xe4+b0vtWHfyELfec20qxU

F/epDlgP+IAFqB0wWlFVLIh3XtyieCpSeLFKXdyMOIqoBEpVHWIWX3cGEgFBp7bVPFG8nZFlc4aVSn0/

Pys5PqmxjT1/v61n9kAGxaei8W36nJa6XwkJcD8GjJ
LQMc+zMeLz6Dv67+Edeo0FfyE7jO1w0NdT4hYhFgFaoRiT34dvfDx0AyIfgarjTM6cpxxbncKKdfn3O/

oxpXphidsFhw8NIxPV41M7kGQr57s944RonjbhH3RjEOQ8pS//lxsWf6lJoHGhwel8YzNzCr2QpFY6NM

5epCOrJH5ZmyBCcYSUQKNCOj1M1COpETDtJRrOiVsN
ls91y1f3OshIXGeZ8+g3wsxF1jqA7YIMBsaWYejawIHdssEuvK5Yp8S81KZmsnkrcPTrZWLZ7DTAWy4p

aTiKh08EFbvSzeifMDafufTn7vSajmnhFXh4Tt+LjAy+wwuuujiVup61EMno6nYyA9Nx0p4cBT0gdx/E

k4e65849/J8nbewwT0rFqD1VckwEZo8EuiAYYK0OU0
6blvRMEyU8l1AAGUpRJ9r6FJwc2mPmAQl9reTfIRjjW/IhT5m3XSm1LGs7Pr61fx0VZm7e7LxBJhukB3

Ts8Pnc0j5uVIS4lHBRb1hCCgZIqiBxNifaYxFHkEsXObDbB00UdXQbP9WNDT4+XgV1pg995B569rXMs+

Izge2gihjRQgtiQkeSffUDmO4kCxsWcH9LeY+gft8J
RvQVYpR5NAdHDbv4Ksu74LI+FrqzQjL4+4adpy3lfKsnZdMVg0inUFOuyi15RcUO0ZLCTSYkd3eIIdwa

dq9uOkoF++hVNv0OtSPNiCkLPtBki3lvGAOYxu7aev/yAvTK+HKkvsEfP9u3iAh5f6rfnAnQowaxFrs1

6gkVzPlJya9uYOlimw80/jekUQ8f+2t06DB+TUf2j2
XqtNa2reldZwb6o1rFQ1QxhRbDE8UxN7oTAAWHwn6EUuzPyiopFEwEAm88BqrdjroliqasX+McspBTKS

IfRiV4LtnRVy+B5dX5Td5Xqw64DCVaI1fEo/YW5W8tHWEb+nkk1rPSRKcwyM2XkICM9mhc0boLutY1Db

D3C4QQfx7ypqKO7SV4lvglzGtINHFAzWB+f7P08//x
tIEFSQINKsz7v7dzuluFshktL5R/XTTaeo8IOs5pNVWPJHwkM6xgzgmMPZW+PM3QfgS4FDzVGqe2e0zz

dwoOHD+hgthK0vZfp+GXrUgJzujfU76tqsD0CJ1AQIG2RGjGPZt1JgAaV6+Fyd0lxcdn0p1TF++1kiSs

v28qqCMU+MpeNrTOoxwy3l/jPkqRcKwIzGCpNbeMzK
+8wBFiIoS27BdUxXxbw0XZ6x2LYPZcVtZPbYJrzQj90Kv+gtdp5I1iA0WaIeg9wWyuNKAgRSpon2ryHP

kZYbeKipFEgxOWUZiWcGVPsdnnNCec5ELYZTWdNOSE04H+hrPWeXFeXO/7yHnbsI9af2qgH/+zhLXCNL

7cl1JW+w9WwLkwBiPQoNtaZYAKbUMutFZUVOdUcSEN
KhsXaTX7h0ObfBE8MEQgvxOuXt9al3XpidOaH5kyEgg/td5B3k/72aP+x53TmR0ELys7KNFfqFeNBbQR

6iEihnNraDIUWdWYSz74cFUv0CGPL02Ia+h/PfQnF6OtCiXbibnUggGfc776RSPP7gBFHi/akljQk6xX

UYjXqi7LslGbmUtkFfQR+dGm2MUQZG8Pyh+1odIqHx
+7bNWcUYKTc5tbImLfUxShMD+pNvcE4lCYetKMMG63rW8H9n9zm8s2NHa1Bfi9ns9T7rLX/BpiDw2BY5

FcZjiIVAcM+1zzbrHe6x3KPh9QEKxajrdlQFVlacKO3yIX4BrdpAf9VGKooutZ9TuOUntkkdLRH79yRY

Hd3IfdulJT0KCQsxv2FPlUje6Gemo9T//4hjBORhSZ
yyJqUCwPiyB8IRn2mIxdiNiRueTrRb+svdAFQ8p5fFga97sIT2UtcUjZ8NgNtGDESrIoK/DkV6MeIriP

3Icqy8DKXqGbdfetH6gNVVsqSJO3VJi23hOnoQ4IlxH6zdiZ3iEc7BL9J3OUsl8u/wmwoCVWs67uJCmB

RgrClzyGA6NyQIw9AKHe4d9ym21OpP0VsPZHDWgtVy
cM5I+EzuI31LVwG87epAobH0304bCe8nhbEcvExuLdHqlrbuM0UzbnBrLOToqF040d84KzfkJHoQv6Z2

pkB7meqiOs8Wv9NvX15EHmcHVuxg2ViFKm3SRG50HfbPSiDQjrwDBK0iNYiBOiYVlLuFaolGng2uYnVC

VURV8GIxQBbn0cDDoF3wmblUfmiOnS5GKanmAkTTBN
SkZAPxa67+UfV6sTKMg/q7Bb4X/p/x5mRbkej96NjqPLusck6DAYJhr1oeP4gQugeBFGag+50sT+5BEh

31uJoRAAzpvI6Cty4ZynoS6DTpFAaeik3eiX+joOTIkSZ3jfYWUB+JhJigsEzY20p3CROqVKdGzxIJHc

M4zWjHex1UzGg6y6CbVbGSopdyEEmPnbN45FOcryAe
lK34c6Qz9MlJOgc53GOa8abvnO+vK1/Vg2nqcV9nHkF3BKPNp2mg0xuwcdAYjP4X8c20OPZ7MqTKFGPP

CdhTMpX1ogplvqpuqPzLm//U91fSJ/UYS63QoOXF+kgV0VmuBC/M65cNUeXeJOQe+5crfmMzUrxRypTK

IDL4prNRttKZm4bLwVkjleSvILrO8SoOQMWK5zAfLf
yUR+Uu1x4ZcIN0tw/458+vMl4DrcCb989T6rXl2Ql+ZynsQbgtxOc1ZsG+ixKPwjsbx4ljlgxVVK1l0V

tI8wrDwAUI+BWbXmZ49PGMkfoSyKxUtxh6qAPfzHz2q+euTPTu0uANl0mSBDE/9ss42BykT+wA/CuyXa

5J6ocAUpB//CYy8WmuG8gD9C9ttD+fB6eE3wUryw9O
XsEVu/ef4f1PlciYHq/mc0frztYXlnjYO4TW2Fs/t/x4sxn0/j3/xLnazeqFtf6P3W1VcRuN2a4BmoY6

ldc3P/M93xNQTTUsJMkFOa61UO0bZ73JsBGqQe5IZrxsk0LdpbqaFLu7ui3Obzz1e2JnM/1zrRWx18Wg

0n+hd7bG9/uCFq3pi4JlVO7Pqj8vLVgh5zWa7TSION
itV7uReAwKmu34QBS6eULHIc4jaGcj8GWdh4Icwadp6zgTqbppwt8TmdiUmttvR1VK3FZsDCSz5Gv31J

OdNtzCUSqquanhNSHYXvGw0JeXlaWEAv6/65mvAb6PBcU8pOF2+r4XIYjP/VeoTXi8lLALel3E5eO/fk

sKCHokTLswXAh/qJ/IxRpNr3jNUA/dHlcm8HAdconS
Vx8+rP36ELm6eqTHockJWq/DdSXeg34epWdAJLBsVubtpWN9yr/k67V/a7LGUbXaN9UiOpwxmhFqAl+w

wdaIeApqUsUzcBp39rDtZQYJGtCeU4gp6s5jEqTSh+WJnsgGxLAJgJ05q72xLmfktTZiAR+jKy7k5Gz0

WxiXaHyfBOmNtYwBreAZIMT9FFx16BnIi+HiJMqu8W
NUjUWlwZ3lv8hA+tr02ksTNZgX5bXeoKtur5gG69qyAoqmSVGGXZHj8cqmDOmDnZ60vTDyK9FvEgEt50

eNll0SuxX475tB3GMkUHUlqdfwEdJgR+MRfhkzvAILrzuYeE9zxit1369uSga+BlUMtQwwti9PKcP/jF

PqrH2EdlLLTfBKiaH17vZ/IWQQ0pK7W5aLz7EE622R
h8OTe08qOfZ/iU315aE26F+PjJ5agOtC2U04QApFbRqJryVKmx9rBL9Ip4EZZt4LN4qatq18pouC/X5g

FnWTSkG3OYFZLdrl5d0ILvxOffJoCo/yRmJlCodc1oXUf81m8icfQe8paZe9gcrw10aTagoEqrsvADAu

zqzlFkekc32Xvbtj9EJc4Eg0xNt/HEHLis2rUXJNQq
BrSUM6qvbvBzbK65YDCY4zooDQw1Z3fIWnU8vECJB3mgdhsbW729mSIJu8oUGY7mvvrCsTN0p6LKvNX2

GPaeQ3kgBQnkzyecrFJ94jwDpur1sMLPtm/o/atw5/4lV3BUIwq3IfS3gVHrUmdKazes6WdlsaQg6/Nr

XyBG+HDe3ffRU8uh/dJDd2Fym2Cnuj3tJ8GJjPqG+c
3DCGUYABw7Ia2S+HMKw1KKGO6Ghe2j7sgnOefSxnil5ww4ULc/idJEawrhKRDcstaH62wNpxfKqXLe0F

F8YZEFpa0sCuwzGzq4u8GPYdwhbDRJyqVosK46q0jPqnsNnB//ITT+0FgMcK6ZgJI9W27bP/AxS2LhtH

0TqhP8H2yLFa0QoQFoamWkpVktXwW/eznWmWGQaQG+
TR/bNV5X04kR9iImFpWryWdw/9hlGeVHPMs7qWaPI476zUY0y2LDaMFSXlAQM8xSUE+rd3YGLE5HvhuV

fihk5+8P60/wzjHzrf738gvWOrHWrKchnHMgiatmWBOpuKVZkPWW3KjYJluqvLAfaWacGqwIzAoJTVPz

0W+0+iTmyd5HqEEA0UGCDA4Za0tkXW+vGVqvkPv8rk
O2UJ2LgvjXeyBoicnKld5njVa9NvnnDgHFHtbFNgr3kEz4SyAqDj2IRrVsn3T7fLNALefv41WrV5YC3U

W0fVrRhL5RudC39LN5gdrLNAk1dxGIJDtfth+I0jKwO7TsGZ2b146AdgOpptr6tabqKd15jy+vdYZrlK

XVXzCkiVhamg0pwiteHvcwIKeQrWGC60dec8Qn3q2d
DTQ6m8CfablYL1bWN0/N4kxAfP7mjovFSEJt4t6RPhmkFP0Htnieo+2vqOU327F1hW0OKQKAyOmVMaEG

xZ4iZa4S0K67zIuk0ZZqqOsKeizzinDu2gPovphlao3rb90IFZ1naJCm4THNWpZtk1D57anJEn75kk8G

PufEEU46zKqy3pgJ6E+0kIn6278ZJLMOSvqi5mnTde
uzQCq7jhOPK8ZFbBdoaPazSThIr7LG+3gh9kL61/Ylqks4wRfJd/ZcIdtU8nn9y12DDMYaKt5EVzseQv

dZaR+IuAQAxXbtujVvr5710XpELyGqZ9SmN7eh1vhSTHoTr6GL0dI4iRdaLvlMxhlHzkOcJ27AT3V0RN

HY+QrqpzvRNss/g8gZ/VAtDq5101Bw8eLYu1Ni73lj
nWS4HGzm5X+aYG7nThc3PcwMiLm//hBUm1xVQ4tPtCuGoP0dZ+Nr0Hb9PkdJIjazcKWCnWIk6hVQL/Mv

tmQWQrRsl6GIFDCrcLDlsIklSVsz8CyGHMGCjXtihMkxXh8vm1nbuYzurgnYfZnO8xxgMBtqEPBqvfX3

qgX4bwzoYTnrURiwyff4nKJmz2d/M3vzg12PlJUNIf
8d3GIcKZqfhgKoD+JLw/BSMFQeKVI6XvnFltGg9Oa23iQQxdWOOGnwrM2ndyzhij5Y02HfaO5Kldlwle

6AvFv2p2zk8WsIgHmlnLyaqEFC6TEqIaywCPhmrwD/LVB8NcMRE0+XcMMSdkxBJNo+f/1c7fxQAW/OVM

bk460sLlDNSSjAdZiq3CcgHvrXVEm6HMd3/8vnE/JF
KVthBcJ7nS1F5umwOda3wamUDZzVKGYNyjmrgrA44jvE4hAuhRc6JkXEqvWWC8yvqYQCxEDZ1N6f7lMF

14/D9uWqOBX+5lNMdTtlL39vTDm2Zn7V5Gr8XXwwO86YNk3eyY4kTE1AEnvLq8DfI37XKPiwuSqjo4A6

6d1Ad8s1nOtZAhaIlB2paW1U/cmpSSDlGCOrdnSh2S
VHAUFgUrmLCVJC65aMxvhmqLQ4o9TF0MFi5s8SxY5TG4vfxLdU4r+hSN3/azJJAU5gRMozokZnq9NSer

g1pPhXO1cxtylgqmDm8XST2Ryk+D4YKAftUMi+nN7F2ptT0CgqIJruT2qgI8ZSreVzuiVR03S4y69pn7

0VzOS+VHGIJFO8PLAdMqJz/zX1afZWdmINMwJNPyhy
JFT5oBVJiL5JGnKSquHvv0COIzNeo/aX3Y8MNZIItMLg3JnoF6dj5Kj0eE6ViRq5IxjB8i10mYczd9fG

h/FxA1hLpgSDQlfB+6Fvyd7otdAgeBOAfFdyCagpW5+CXyCkDansvd30kO47hT4HLA+5driJimZgB84r

vAS40vU/IAv3vvrSQ9IJJYa18v21GpE2QN4+o8DUnz
LFFm1R7iV7T3cJEqPgU97GepWPPs0n846OmAfNAQUYUTZHlcmFgAzbNluDx+U5WvOyMjpfEQtfdOG62G

QYYmc+rTjQBj/7AJujFCw1Lz97O90a+07j677t9TtXQkU9y/yLkdBOu/KY7BdeSscfDcL2FzdvN9RpUt

qVNRHQwvHuEVCxdiSwhawqY9vdtUN10YH7wl16vI8+
4/nkFEFtZiIEvc/nPf1Y4yMtrjeTSzTDOQqSVyWWGLaWamqKBNZQEHUS7tgFJIXOIFikZfgaFhkauaLg

wZppiz49kVXh7CrrJ0OLSd7U+Zsiwv4UgTg5WvGgq8Rd7yJzsdESXNdM6x0WgN2+e1oZCFSG5n8wI15u

sm3KtpiRvyW61ob6Vba6iY/zEkzgFdN8RLvN/kMAzB
uM4BdDPpA94x3Cmh0K4urmmoBxdy2GW9dNyYzxqKLery+W+09tCdEJx8i70rJm0WQNkhev9araTvzY74

gLTIPZEZQWgLWh5aKfb8aaUDMrIUvJKPCTkOo4Be2HBLeat/m6c+2jRMm35LwoBKh/tgIQlZ5c51SzLw

6NnfYQ+RALFc+HH0qwIFSfVHgafmEqQ4cTN47eOC6F
fqL1LDmJ0ogWqMxD8xl0sRVi97GyH2CLkTBp/iuL/hy5AuH69qPEkFXMZaPOFyXFmaGnRjLDgA3eIf51

YN+Ha1r+sOOu9jr8o5Nt8Vj4ZhUm9Al6zq+g5LbIcyODdfPCuIKkdhBJwelydnwtwX5zWJVOpkucadmD

jbJIBm/dU3sSxhSu42YeUiRy6Uc1+sXZJx3OdNxBgr
fzL7+x10sxTrT8KV8llriQbsf/s64xhtu54iLSnb9k0LLwYq+935QZ4FGA61sOXLhwOn1UvXa4ihkgwi

IGU0nYBH/WotwVAx0QyB01kOeaOxY8auaGq0GOa2cKrsOK5HjYYQAhAZvZEI12+2F+qW0eKkcokgaRs8

bWKA2u+QLw3Y46ZaaBXqa1B/mddIeI+scX2bnTPGdD
YU4itburwJphWJ/xfQifExSO1q1/FKedsA2x0lbUokFk49mn8EHR5DqECA5mtot3q+vCydF9sW9YRA4n

Lp0MrUGtSGA8+UqDa9iioSAdJhY15xhZjPCjakp8fa28H3lS+qywSFFA8OWZUbGYVCM8gNAcQeYspNP0

LMDYNJkWTWm89VoxMblImoqRbnrw2lxEIku7RPvwqy
tKoA9H03pfvP0ApfV3iXQ6ykfw2nDLj0uo7x20uqE8ujrgoCnkZWT+gckvxyhpxZNCnR/FyMFVdN8PCb

o/AHB/3jhVlgNp7CFawtnX07o1RMPC3DPTu8NyZ6f4fK464KvgFWbOWsKB9XVl1BZnUb2cShar4J4Ump

8izgQHfrc9ySWIM1Pdw2Prtrzef27uIqqDuBKTOx8G
ewRM3DhCW6+MBoD3uBE43RnPaxcsv29ahnPKbftGCxf8+9f34h6pa5mGeQ0cWNYUCelveMHB9lA1pMOQ

ZcUlXW0tn+LgNX2VaJXLBC5KAaMDhAFoehFf711AX0daO9mzOk9LQytZ0rasYJQzQeh/g7oRzHeXK5cQ

0HooSpR6PR9FGzRipF5nrrO8EegtUDok2oy2+O231R
unG1FkQQ43KgGq/hNX0sOEaMWkx8999aBNLI25IE3A46Ob427xe+AsTjFQkp3VdRbNLZUBYZMb23T5r8

Yte66EI2yVUiHydVN8SDF4ThC4DJe9muuopmiy3+s5GguMtLWdZARHJhZdis+WgKxgumjqBQzF0TlUi4

EuaAZFgyJluoOw+6Iww32H3HxaBijw5WoJsMQ9Yxfa
eu+QJEXRTYO1Fuiw/mujwBaWIFQmBb2zr7HGa5MXb31lkuXKXMhHq2OVi2A9jSFPUAWEpvIm0VU63ZIu

aO8ifFaDdPHuzWlkzxPS0csq1ZfUS8MLmrADjSrQ3HCmob7KvETal/PIVoaCw3L59KK8E2D8whBxkPpJ

L5QahIk9xRJqstbxfVp934ua1TA+bySxSH/rUl7UZ9
tac3ntjL431S6X1XY2WXQ394InWoJfzNrEpl9FUNuXbtAaP7t1NSTvQEKq4RXnyPHmtxLgeMXvuOmcGn

zVp6K0hJQeYyALWZNZleXn+Tgj9livLOfFAl6MYbi41aIlD5hMbtFq2AONHVnO8LFZWCeV2t65zxou9B

+9IpBIMITMxssIYQ7ZX8l+X1sCRWZVBgr/D2hSXFVS
IjojDwZuKR0Hjw6C0d8hkFbP4IevSFS2OE7ZdLfusXF7YqKXf4O2eRI6HT2xcEf/+zfTqWrwrPiTFacz

mzjNEojjJxd/lowQ6QZFfzVESiBqbyfnm57FC3gE2zVMnIY8Im8qlUwEZ+Mills/sDHvJeNyfxfHcd/bGqX

XawjBX2FgXU0pknHA2+py0I9JXavCeFdqCvMw5HQp5
4egLdJBbtMdiUFrLXZWuoNmuz88/3eBDNovhO6CeIOQLHcW5gIfE2q1CDNv+w8IxtBuBjKxup3eP4Ney

mplga6h7isq0BW/bw8hi03svdJ2LtY1C4Hx2eGtB5jBvvWKjEBhUVihs+FRvWPB6C2drbQBwvCBGL8Wy

BykNo1S+2upLJWws/Fg341WVRCMGjGGC34O5mk9A3/
H04MBACAud6hBfWcPovXvfJRqtnVkQM9j1ySfXNuKwI3e/zD1wK3txm6HAuoqo75yJGp6gMnei439ZuF

/mgv6WkDGCaoqeNqoHznMfncHxJnGPs27+afQypNfiKtyuEQVZOEc7PZkmv2QxiOcEsVHr06Li/oR6cX

G3BVQOoh2cJZ9P6DFTZkqoO7xJavz0T61F/TVe/f+m
iOwHlP1j/62E3XJyD6VetgbLv13Yq//x4N1OnHHBqqRmAGgh/G5XJ9/SISFK0M2jr4KkoyljpRE4OUqj

YpjMP7ZEHvssjfkWeJhFLZ9K3pltmkwZryU0LvCpnxoe0jSNVd8QDi95TDsf45yQv7BCVwdMeNQM5zL0

y8Ga1z9bg/jYEDhYb0WLH2mc/lp/3cO2vbpCff7CUT
UeqF9WWtr5ijfNYTgwIIVorpO+khfNMPBp043MVe2cpx+NFNN+vzEIESdf7H5PKYEfaati+7QpvxrsEx

hq7LMX0IPeiNBPoY4QWucXyWdol0FYdxL4G7QhSOkFsPfB9KWm4f5/wUiL+ppbE3Vx7R80mds/QDWDGl

CYUcwbaNKMMWHj0AE5ZbcYba2aQGIAb0/bp/azBjoc
//zS74aZ+TroMZC6AhwLiJ6qQOvzmDPiol2MPYNubilvDDmw39leIDvtGxjiF9QeqZY7wuMy7ChrM3ZL

M+kqriO8isKbltUJt1BQA6TuUZrzkjwQgvh6nxu+RgY3gdxTYKAcW3xLJJS3qz4ckBMoGE55/9Jy6t5o

W6Np30NnBXdZpEvM+Z4eXSLCqLYfkam41wRK1/HwCR
HicNCWv/jYRbrT7X+JQAbaroD8mBWv6+ACrjTaYCnsM0+wOoNe0e3DJC9zgw6+oCWhqQfGlFPu9zhWrf

p/bXK9wQOKY46khEb9Jzlkpgaap3W/U7U94UYhXkTB+d//DjJ9p4DLdee96dnm4f4EgIWwwEVWaquHu1

opLPrjEgoNRT7WHYS1VLBdGvkWhvuJqvgsmVWkwLEN
Q3XjBwj5S9IvC/gP0TdddEX+FdZnJk/L0TQFFIT80Fxkekl7fQsHwWe0fmzoZX7Agp1uQqhighlwJwHw

KSH+NXoU2YjzlEIfhT7AzRli+Hbk5AYZbIddwjVTMca6XTEJ+DfogD0rNXaO7tYPKsFoZLEc/wnFqITj

dNBj1IlhMTVJAcUIPXLfZk89YWhknRvHHzLgs0zdBj
FgXARHSLTjUNs1fFZQnEyHXTcsdbh2Ns5rASCRTLro+0FMYTe31E6N7+zCMcCt7L5wR3NhI6ZyxkU2jB

U6uXLFFIftIsniwKwYHtOt93u6BIBANk0NSvg6Srzgq1oqg7yD8259RiHByaNp5qHHcQvAHUEfq75/EP

ghyvy3bzK2xWVTdfLKf2vPRpAfbvVoPt8x2V1Kyo8b
M/PmrG3etYKJXhwGwD01Wc+iKeIgY7nh531sGrktUKtPk/d5nIKQag4hsiK4pKtCRcf2MA4EdBrS28Es

4Vss5w2W0W/p1GGt6laH9aj97wvpkH+tYVZ2JcDuskAh6SkRcxH8UbuyEWabnSJp8AjN735dMSu6eDeP

42r+q0XgQhzco4MBagdACu//LQjVp7xriZb7q2s+HX
vHRtzKA0ZF9gs2dn7q8RABsZkWY1WZZZOF1UOo2ySbS7O89o033fHg4kQ9AORdQhpbhgiEW+52+rRzVH

0wJf+/UMyfpGoKfNwSNqtuzdLWZt4I43TdD1tTJSbLgdDRg8mB2IWUV3a1Tt3Ck8c9fuuEJzzFWKsR9e

WMpGVcNwQok1NLvdaPITuUuuMcAOwjYB74fjV1cght
bQO8isWLMWegVLJps5lxFKxBmJjxWh9wTeoJbcx/OlWG5DXyACe9/5DJ74mKqmo+b2FRTl9SGjIO2BkL

jzA+XSQrKsssKfjp4MGXzEpoWZqJo/5IdA0LYt3NwsaKeK/rNYzlKBEddmXXE8VwRG0p+jDMQxaeo3+0

sv8/gBYB5/lzANq+/LeR4/hQQOw2aMhwzjp2dAgkCy
NDW8eNJWzrKDDXOT3JPeqoccQ6mRSJBQFk/2wWopdvF+WN19xlWQUFEYvN/RBphBKMif80+j1usMUIvl

P9U1njzAdW6SzxTp601fnz5JZPOOwaWIHfTZyvSc198R73j2/9y2WAFg8gBFNEhF1HP7doV6LQb39BXq

je1u5oD9ekWx6AH29VxM/0Nvz9x58pael9wNh5UzLv
ri286ht7o6BtgTJr72OrYeU0OCg6gahr5Dm5dX61RPFv80iNO9VmohST7dUIqcMMXcsMcmXXJapxtGOA

Xc1mRTzQt1Pk7M2Xx7HB1hXbnFXwXV0pS/REry2QmnJLViC4E3B82SIwq5h0rwjFVeyKoFr5MfDECWpN

hQ79DlNq78gE5h5+Juo/ZPhB3yn1DztJBjkDXwJKMb
0OmBuLruFRKsqXyOZDzR+g4GkREM3vlMQCBScGtby64Q1f3V7/Y0MFrWFR+0qoaUVVmYJ/4+OiZ3na4/

aF3/KwSJzsb7daXYBL10ADGgzbZMcA5EYVY9Bn7yKmRq+8CT4e1msr3YlqzdQspQCNOBDGkCGduaDTVJ

3qDJQhoNvIw/nQxqGcq2HKFYXgqIeVSddwidK/Mv+1
kdqour4Y5bTACyFReyErokAXJ/JjcdUyCelN9t8vSuV1Rn5cqajYm1alqeSSIf+fn/hp6eUKCtObn72C

w1T3kU7Vt/5UqOOhPYYK85L6wSGouAmSGpoWaaLxpfaAoCe9V1zAgPs31SyCXIUjEziXSLSJz8WoybdT

6ol6rWPp25/2XIegGNnEDl6gj/NN9QFtJVEE8Tdabv
A/RmI02wAesktlrcwFZIg9A5Qkz2B66N1xhvPK9yqlm0Et33NQHBZbhsdN//ud1UvZ+spJb42PP0uPbq

jw8BNKkmwiE9H9xsVPPCvzXX9RHaxJICPk+HLzbygdSPmfvwj0xOctrf6vtnHPwJ88DTcbJo435Q0k3K

B3lZ7A03hY+HFU0FZ+IWq+PvUgrhbwrDkPvI8pIgrc
87xgdQVkXlw5vwwi9AwaEHZ4Fi0Yb9TDJFetc/deo02Sekw7AVD7/nsuF0cKuylN8dDTocfopkh0zMVN

778KxS9U9YokZWSBY1PY3jKmFJc0WV+oHxnf8PAd/TfwL8PQlhAWYhJhHisnGG46UJLoqT5a7xXd3ZcE

BtHumlbms9xexGaFQdLeDON6IsrdCM2GFQMxh00Yhs
5lmU59uIQixhqlVVvdqLLzcC4pY0MdGHDnwlaYLbrvtnec5lqqP0T78OoIJer8eyoVtQxy9cdlQOC8B3

p/povZwTjgYbu/vRwxYeAvcUuW+v1Gg9gMjRrdHQd9v5cKUHYCOLLutJadwLx7SRonYUgSRmzQ0y+2ym

LnBw6IPwyS92RHXbCcawkO2zpAKRu1Y/CehGcU9sp+
7k5qkMCh44rCOw2f2SR69+cdwswiXqE5jS3nhZJb5EquF3hqiY+W2R+NxftNS1vIXtnNwch/yaTVMXmZ

ncdvHL5sYKeBwTmNRYp3v3F90UM3thYJQjfSyVNihYfqHrd5Xz7bZm/N41FpuJTJpP799yb/oiwixZe/

k5CmXY1m5EB0TE4WVNvac2VloIxvYQp2drdzsnjVBa
FV8ELzf++tk4te7R2nlWmBdbPmQ67Ly+RskK3JwbY+qO+kSzQetdZYsMuChj3eY4GDHQzIIkt7viebc8

hOKYTM/+sn5VGkr/Yz4dUgyUQ+SpshoxxZK4tle2EPB6gqrtlQte1NXnIXi2h+5qSP/PQ8MPT1BTLsf2

mAR8B2gZz9gyjaQYuYXYR1GnRAbySd8KZdBCNCsJVK
bB8Eyvh1NgMSm1CPhv+2hAIYB/TuGfmBUi8QguSEigDhBhkfmkRFi8FPC34Hrkd+LQzjofqYWsZMRP7F

8r+3ubycmq4a8o1RxJJimGTmmsqDGSsfHb2E4OCMl/zfxVe/zqnleG6VVrCXFdL3wPwVNLuTgGnJbXrY

et/NJa582/0qhvp/s3Qz64R9PDJRh2AsKgGcP4GWrX
ZYUd7YRweJHymEF1vt2Qb1njGkxflJKDAIrroa3Cg584hhQxz6BPT51w8UrbEvAV0p2bZLO2MVUsdlST

arWdaK1Kn0xKyQWVvMZuK1PJhFdE24BkbKXeYGXujohP7NAyc4rgCxLDJiNCSAoOCfLJw8VP+pYir72G

zCqPH+WSMm4kXgRD/70W1a6OkDK0vLj0m3T2fmOzKZ
04JqH9egWEtfWFqmUPm/i5ZA8ToVmgW8BJoehhCao5kD9lSnSjV9A01i87jIjneLu71K0zlQm62G1b3h

9MrlVaqSv8O/I7cgoQj4lRQF3WUDkRFHiipRED3xqQeAsOo9ojl5lTMJ3f5SKteJ2W4ZHJP95hyFUfbc

ZrdmyjrCvPUQ87DkAbu1Z9zeohQPEAkp8eB5yL4P/V
AP+R4g5oNRqfVpbKjZ79l/tkpjBTrJzr5ssBKfsWKC5CcZE1Kw5MDB5j1CaJT49Uu+YsbA8Rlymz71BJ

KUQ70Ezi8qSDXuyGXwm2Gd7JIiDuG2RCCyntCR7VJ+5dPX7z9wpSfbZm5S1kTOaJIoJKv5vGKUAE7ocf

KmpFIWRiVQkBzem5C/CsJqPEgQPDnZHka6SpKQ0fui
MjWCrjKDpQvjV0Y/FDhnboLBPfjqaz/8P264UqzxMGgTUKtVbDbs28FavXR+R9w8xftvfo60dL6nS9Mf

GpAdi1yfYDJ60+SRhumC4GTJjG+c9u7z0snBFEcGBdXPivthjqI7Qbam+dbPPZr73o5+zS4QVREY3kaj

McrqE3+4p4t/Mi5Q1pSfMiivBXKmRvzSGVEphUswBa
Gy8B+Iw5+E0MvPKvvDHxkuwTHSV7WEU1DTx8pjkvrHuF01jROBm1L+g3lFsfeBdxwzrQw634d8L6+mX6

ZkH5slxYz+cMvbMu8EtGYkMZ7mcysHaKGUN3sPYtLW4msjzu4CDjmiA/qq9PB+PV+/Ia/X1Y2zT58gUR

2d9a8m22DJGh1cQzB2tlr7YnGlf3A8yKvl1p5or5tU
CsDqjBRL3vA1ghJ84pV7fUf/j6g72lVqlr0kJtj83/+B+FyT/sUld0Ai7cfEMhVxsU/+Yb1776SE6VzM

5YtxsW0sQesjx73Qnq/9NzZE6xgG5Co8WedWIUti/WzotRJAH25YqJhUpdhVj/7Zmd5WtbMFnUp0Foyr

4jQYOv6O/EPHKuIuZ7mnhc2LM/XWpOgt87rfsFOPbK
YX8BzTsYjyG2Ya0LDpPSlw8oi8be8+1J/0Tv9N/eZjP3UCbGmj7Obpt3wVRSW+vU5c/tbKfPBa+CPwSp

nD+rScNC6k8B2052Hu6JodzsuvsvVsTwmoTe+IxGOi0U2tQlj4hhbdEIJQNOwecjGW7HKKshpT1sbkmp

wPBkd3v/JwUza+q54gtN2RG2aCVQ85ub0OfNZc21Ub
nq6f+b/ljFKhRkCfezX0E5FF+iiP62dzO7u5r3bDdVg/Fn3wjz5KnhwRGEE6MiFRCtXOO9Ls6bmPyKdc

YnT/61NE4ovhpccxexLGjwg0dA7v6MAMxHd+uHGgTvyVIRL9drNVuOqp2B5rqMQR3EcxJODbsLhqxbMk

du9kAhGO3EB4GlsSgEUkxiuDe+F49457mvmdv/UvTZ
ssK7/JTntrbbJCpPuuTQehVfoXXOO/vuqdHf2c/aRM/yBqm4hgQigKPcsyo8l5U/ZOZZp8e1665OByzn

yx8g3VVLAQibj1XYFkbEBt8S7my+Hjxh/+Y90J+6hkxthn4GJ0xV8qEQkcNjVTnffyiDJI/Y0mbkozsa

qR0ic/Q4QkBPIabJUBhTB71jdvxA4USQrglJVSd6uw
Fkskzqox527aCA3qk1kegOnaqz+940SK/F1RTKiDqjqUz2KdymKbupD3kEAQIB7Akoe/41Jv2Fh018qg

BUVGEaQd+bARj5lyTyPehsDoh00AtWPEm44p959mYNRg09LdEVlMjfZHQhjhZM5GcXPuMvqrThr38eos

pdM6qqrPr8h0F62V5M8zGAJDQOQ8eE1rYLeOwv/iHp
AfDx3+qbJT5wEJsKJl5EZDkCSeRzps3ghGPmtCO6nOX1FCIfZwX+ODfUAEdiB5SYF72Xw00nTY9RPJEi

yA3gt9kFmuC9dcWdiR+5gJt4J88Uwi/NspQP+/kCc8S6HrqfI865ETE/9vJMtneMt/gWDaO5BriTp2Zm

crhK/CKGC7WuORam/iTlto3s5nZ/JHgSnxFACHQH/f
s6C/rdbwe78Fs70LYUJVg2OLyZcR6adJxR1F2yDvhF4DCoytW/EV9Jk3R/aZ/pwl1wdOK0y0tTXmQKZw

npdzdHKhaQqOtYkfUEWGqoL8MvfdHvz+oB1G5KZCslCMZ5n+zxXUDnOh7sPTMNtz1w+2ubJdmA7PoeVO

D6iLbpXbFt2dOzoGqmKyrAbZsRcLiXZuZSTQiwA+p9
qg+1TAqV4qiJgCvZUOHp+3/ilH0i/HWGJp+brEZyhPYTY6UEHUc3CV51LENsTlCetoYMzS5OiL/pXLuP

rG1Ztj36j1I6i/iRFLDSoS3rFid4R5N1gtk3iwNKm1V5NPVPGQjXcLtKHtdwSbj95tdsYEGfW+Ki6bPx

lepyMQ/w+ZDA2iQqJ2qN3/AL0+Svutf7ADQ0sCgFqB
ZmjV4nC/ffR52G/8BMS0oglieB9bbphG5bwm5GsbJDFTVVfdnbYKn+EEcfMD9wacAW/mqO9m/vlfzOLF

4fkTs2eoJaE15qrfWaUfwGZRR5s0p/BC+SyMwozWl1zyX+5nZKN9o+PZA0mSpVJlopNqNxtiFmu/4DId

lUP6OMQ8MTjuE74+bTdshKtjm4IxN4Qo+KHpR9P/F7
LfYMu342P8PSSVcAMDDkDHC99d0x33GpN++lITC85dEpcUPW3x+aYx4YSOOcEE117svyul4Oi2rtXinI

BLAhBo7QFC35aPa1LkLb27PwH4iN7ocAwoxj6/S3Z53kNJXLiBLzNmb3bVqFri3+/md8qDcmL5YXgGnb

jvABLdpFF6q1TruDvz50QPVnX17x0TAyuDfydYWTUb
KwUtTgZNL+hHSMnfVKDgLid8Ix/xsVWMihQ3D0XWwZgEiAU4xJporM2YhL6RqdeccWqjYKyGlut3Mff2

kAMjZjgpVlu7dmXfOn/5IbeFLSx0PsX8rWFI4kB6MwdjvVZmA3xnSXc2IxjtJms6CNzOXrBv4Z0k8Wok

ff4ZPqkUk3nXhgy1VJ/I6ADlO2jJI9lM7MxticNvK2
O54LpLQCK0ya3Nl3/j7MWuusy+Wf1ZLafbZg1caITdS9OCcv2LAZUIo3gUzqpeuK2/YGJtVX4NeDlf9H

I/xmyEl5PbPGLjPRsgZ1ct6OwlVI+I3fJVJUZIi+uhqL/vBRtMSVKMozxGPkS2zsjCf6Ckx31vNiILcS

so/IoPdtJTwtQ8oQAlq3qprFqH7Ajb96z3GGvC7bV3
S8IICkoGHmCOA9JpNBTra/0Gx9FaL0k4GY3/dad5imRlGqF8mNlFJi9CNY2OIjSdjz4PJU936yOSGGpp

6y+Q+ntt6q56jQmx0ABppr60YDsWcW5eOLrWXxdTft58LroZjBSKS+NvqKM8rIf4V/Z0Hf1IPfClnjYO

gWz1dcXl5GGxAWAZhahM39LocuNIuHCauF+BSlfRWF
vkBbPKmt/+btxsoe+8qVAvwG3k/mZJ1feSGUbozd0whlz2JGynhEp72cjg0DKnY4H/itmH1C0GMoOepq

qEtpo+mQ5wqHUwIEj0wypSt2JHL8uUiiJgBkMBZPWp4T2ym8hfnh8V8wM2IR9fPaNWVOxhWBByuDBvMD

b765+vYmqKam0kTpgs8ancTcfCtCWGjlj4Jr+8NsRc
8SdbnKpeXJe/WSU1/doNjHOjVIWyXzxI5pcxuu4dMSAW2RszQ7oenzOlKKAxRY2bE8PkmCrdb9pxufN1

iWUvaPuyUHI7v+seofj1lzAuDeOn1pXqA17w+MczvH2cquTtr72Ac0LSS7y0eomhSYvcvo8V8YSqHlwY

YRoKCirZM6MXRg3q1uAldmd24NqFFuKmDgQ/4AMF64
GPaqwg4BYLfVIIG3keH3/j9WpGArayqDTNB0OpgJZjSheRQXd5ddcscJgSUTU/578yC4F1nDamDSAc1R

yQXCu2lO03vduP+O4XGtZ56j01icV2hrunYwxV7SjQxSC08fqTdyxIsV4ALmQ7VJPKBY+Qli04aoPQUL

aKWMOO/sXcohuPn9hWVpTftgzDIGXGKhtpLCvllLBq
7NnLQVtkH7IiHr+MWZvG7aoaBVHeQyRvLX9bqU29BMD0Z931UzicIVptHBcA5KogYfLT2AScFIUgGWAb

ivAQJZUsRfhIDl0o/iJ0M/he4G5rkz2j9r9akK2vHlF0ZKqJAjt9a7erkNRm2lAN5uQxdaGzrmS/4+cr

kSEQKl4fkafGxzI6uiM29FSH9BYXdc6ssu9W2Hhblk
rZdl+/LJKsW0P6Qjw9EuZCK/4nahq5skuxdkMIKguxallkbDKKSHRHzURCUFgnQBgiSQYm8Xi8h95J

/fPz0kD7hMILyjjhDscjCHEQEYVS+53NEYkPG6UqbkFat8N6V/+PU/7/QujNggtm+HSbutg8fOxsgN4A

QfnECmuKzNE7VjQ25Ie1J4tCsmADbPzhW2XY8o2nxC
XxgE/VzwcAa/DHpxWP9HLw9ADAgkcdfJzdyOsqxWhq/d2Ui+arO6bdYAK7RcYiriE51biNn2APuzlwMr

4a/lsamMx2yUQ+jMZLKqTmTZiQzKTPfwRk/xpciM0L+9T8dMGkk/wIHC9bIkalU6bRG9Oob4pLS47sLP

9tkYbP88ekiGOqhOnLtQ/ZgeMZgqiCl7Fo9u2/m815
zw98TjpXlIIDKdaoPFyynB2vF9dHqPnYZZB/BeyQJOV1DJCBXqfVTjPjoT36KycIOM0DvSnvVzv+ZTQj

PB3QDLOW0MXCg/gua0WDQuZfWM8VEThcOctOnt+BpZS2N5yhUebBC8KhZD4uIKMeYaXZau1IEiY0arwX

wAM7NSNujgG3Fh8yye98MCXSewI1rJNbKog4ryvpxI
DSO7OJUflVQRk8wO8nJIQoY/qHwn97T7fLAtxInjt3wWQBIH0/+dx/K/8bq37IUkkeiQ6meFG7u5XEjv

p3XUxCMTmxSDqb+YVYP/TWo8eTVAcbFhbhzCriX0SaKUXVlyAJgxU5+UodMXNfrbpf1AoIUtjj/n9KgS

GbRtYwjJRx1Aw2KVIrlHi/H8XHNDFxTr3e3Yyb8mvs
ie3Vx6vNwiTg7fQSstxv63zabvRP7MFd9Ba7kBsH07Ph2fc3MSXS2viB+h9wynAvLC4zV/dvb1fCdFLM

ZLHl497c2Iro1998ljVw5NazcVJaPwo8ai/c1Rxdmu/2D9UGKL6y/gs5plf01fvwmISNyxoNIc9vwc3O

PU7vYlfdD1DnJEBJmvnXl4P5IRHUe5nv+Knu3n879v
9k4Ado8wRqc12M97mtjhvEYkhJfmjxUQ1cmTwgPhZ3bX8aN/yP8G+lEOS94bSInByUBdUNXtrq32yw0B

qp3ctwqec68FFTeaGDSsb1hjYfYAljKqNGq14OWTj/DY93Yd3muLqCTU0+GqCr9ux9Vmdad/fYL9eMTc

6MaWGlcLIk4DrTAQey7AbvLbApvPQokvdTpKTTkfPk
otgUAYsYXCOYv+iwVIl+59QrfvBTom8cuDKoYVsJ0FyDqS+wPN9vAXQhvqwnX2gp4a8qSsMgdy5e3V5V

G6bC64Vu+NTWhQX1jxvbJWZf2zRtP/5qZxmCL7cFub9OuXb1QoxDc72ZZcjmV7GbppYvrlDr2N1vQCSs

txX+ZDxrJH2F82SyaBi/Dj0vBNCmlQ+SHd69ThD75K
A0KWhDaysBjBm8pI1tf27w74UdZ3qQNP5wKA00OaSa5W0C4e/MzRIishmKs+ThkDodFgw6H2AK3AwW2o

5WVDRgxYlZA4/G5+DQcbmHdmYiPchiqKZ5C8t2+kdgy11QKZLP/7IM6PL5hUFAl4dlBn8V9rpyi0KTK+

rqhAwFWPduuqwzdWRIrDYTJLQQIgeTK1Gc9zXznoMD
ZNDIlrp+Vlfe9rSalo078USm/0MXt85bwYjSqJT92ZUT4ahUav318S/fsZh2VGr+xEfjeBs646Ej7Ynl

v/O+Wpz4EI27a4dZIUgEcrtJTSCQzPvaK/z8GZ00FMhq3mMpm49NUILn+9ApIIJxm1/8vb9o6EIL67XH

fhHS3n8d8yL29PdBhI98Kqb6X0/4hY5maZmqFaOMDu
drGC093JKhUwjyhzONddMJds6hWZB2p6yTq1Fv8eHDcb7HuKBWMsRfBGQyonLtsqKkcDdtBGjGcGAEwb

jEkmYgOmqU9Dr1BQJE7oqiCkKxkaRgBAXJIU+DJ3PxPzpqMplx1SufSUfgSX/Bh/bhQFi5lS96LIKvgK

EpIodvO7QzY3uyT+MN6o1Mj/gzXxlh6XdtuoubLRfW
6mZiWQe2hK71YYbA77AevLJ1eyse/1a21IpJBSQVtlCAdZen+GM3mgMfIR++m+q36dK6geVX7kpn/FtS

Cp4gxpbCQYDMqJu/i2p9Rj0LsfIjVu+l5eq1wBmWsv2NVS0PM8mB/l/fVv1FeHle7M6Uz8/ynMOAK4xi

ivLH+P2aij8EMaSy/q8Meuv5gek3mT3f0ULRlx9F3p
ZepUC1JYyi4kF9jSPUa0+CsSxg5E0SBzRs8wxLh0pF8NI2HDhnu8YzkclgyuZsl6IMiBvm2S4I5DWK4D

WBvpv2b3tcYvle42tMJ82/AaJl5tIp6MhkKrIl4YaRL5sfhoum5+5hTXp8PiCGamh52QZW/7BDip1O8Y

HIjBKq9wJ+bonds/72DFOinHLTcupSCdU4IE6ncG/ywYs
D/l4RV/XKfHk49SFZLwlp85mF6id5qIPJ5hSN3z3SFDc+S0w61NnfR8NKCuIOFe4qoMaoCR2Zg4l48Sz

/opbdN6RMuoGAiX8qU71B94TZi/3aJPOIiR9V5MnONbB1nz45O4z+keEDKStWNlY5WpPAWLJ59TGAs5Y

VLxGJq9lnzbPz2kKvNuJmOgoAKt6u9cDNR/Q09bdoa
pUljnG2Wejp9svn2ES1GE09V15LTAgtEwtSgWJAELyNcEZHQw+HG63ScbIdbv9L3cL8T08Q+XNcY250a

dLVHkleJD1oquYzwf64ZySx2fquehJNJDU7sslPsjuC6xMhfBc1RQf3gF/MhaKQ44K+fxqncICN/lpDl

snwi9+lFqZS0pzAPqszvOQMsly7uoDLorQXM57W65L
nWg4iPVS2vzfDMn6mim29EKaFjlcbpeVEN1S4GUZIZs/i7zY5pIc9EImZ108qtw+hrhX+2N43nKSN2P7

eaMUMF9wZhyTLhRGHR5BRja/eo343R9yxwuRCruhxObkejdh10jDFAPBg3IFq0peH4/+HqhdGKHGwkb7

Udyy+W3MUQ9ANyQSyzZPy7G6vys4EXjWDMVVc7yFln
DXObg3bZYuroZmsOC1KflzMPhoah5kJkmG0y9RisJSEEWW+tT6hN9BhhUoRyUzkJ8xpa+CS5fabGloLC

vMRlk9cH1hEG2FtnPL2+EVEfuhJMG0kUSFfvEO1+oMsb8SFNN9Yx12NnFK7Tk63bLjPDQbyyN9BAzSTB

YAHAF3BPKkdOF/362p/z/+hf4sn+mEZmuLMgmq9+yl
41fcjVuMwFZGo5WVkJOTrefTYZq8WPZACrBZdgu+ehfDCUUlWmhdOiT97rJvLjfbgqkQ6lJB+xYRS62m

YjDd3XEG//Oxuq66/fufwY1rJI2zX6iOots7rDiTpdFfQQvadpyYmrt5viiVEKTjd2Yvuphl11mGpC8Y

ZEjv38BSdA0J5R1KLLe5RyCmJN1gC2Br9LtaXqAf8s
UBGokX9ojhPgt+yB6LHcsQsijo/nXzNYtMaw5LTVfOLdflMaxJl8v7IIM/imUoCdMqPSZ/U6QacNjG/7

y8qaOAOJUn5tj40jobskNxl5CuS81+UzZ5KefYvlw7YVfFMC8yj9bBOzBfG93+WA06eTJ3hDwh24qykL

CUVlzelvnFtN/xsm6Pv2pc3YdZB5Ehv8Z7jcw+zKtN
8bH4lwjhKKixhmIU09BfOSfzbJbeRbk2SIFYqrqpF18Kyx/lZVbHs/cxhb8NWyrOY7mAPMvpLEtlXEFE

WpoKNjMaNisqGIfTLlP8x2O8rTvyTr1E9RopMEBFiaqD8l9otD6HdT9GVmUB80F7E1PXq9xh8vJ7k30E

Z/uxjDaQ5q3u3uwGluSyMyEnAbjM/88WK8SA3fioIx
2d+IBq7/MkT6/gSqownCErbqd4k88uSKdmXtT27NZ38/J/BSvHiIDEfazDUinmOXlzsk+Gb3UN2g8X1u

M3Gaj0yqTJmZSrRLxH0sgC+NfFQRw/tah9JIvnRwtecj9OgfRJk5dLauhcEw3n53NzKO84pbmcXCTTXC

mhtDPXkZssQO1kG3J4AlExM6MnpZRg9wpKtGd9BtWT
NXbH/MgYQrEc6mLfhZCIXhSjBphPeGmml02UnYdQqeupJ+RYXUxxms777qYvpFG3XRqe0xFJarXjuFBG

gExyY0Mio+V2zFbSrFEW9YFcU8735se2T7td4CKoK1jNpC4ZapjE/tQLaeKiaQoYr0QPg0tvEfybftfZ

TzWlqLSggQx64l4kUIWZmHPOPINpldJNi6XqDP++GH
WLBKv7yLIrmDCMeZAiaUhpJ9Y8XQUCOGMOdfVCcSWbROncPINBgq0mK23n+tPps6p86G5aLp601GFF9m

EswldAhoQhA3WQC98lZY4qqF5vnKijWuonRX6tQivkLlPMm/HS5gZx79o/XmlYAQhJTu26NZDZH+j777

UETfkwnfwMAET0EN27i7fIxUUec6YHSevftGAyMs4J
2EmTC1k4u27ef1JFVU2Zwq8JpXmCnQWQ7y0YC8uLPB51WPyiw1ndqFHPFS/jKIIoJ7AP59Alft+Gb+JY

Q034GUYw8z1A31jjQofl+B532imsLBaKGf16Q9qz7/I3PQWyzq0uN1mlOShjoMO+I6E31v21bks4Xc7c

M7ZKzIfK1DPZlTFbkCnAiIzKC9490hCkxV6dPLpQKp
D0psI1BwYXDBoQaoN0Ruo3ZPRlIBqopOuW8okgza5bqUWwkM/yOVPuWWy3J0zCrG04QkmP3FfmBs4/FY

i988+Qee39detihS4K/enQZOy/5ldqeukPuQGuYWWCxYwXojFaCRJJ7BFENVI8r28ljGP3HSnmnVsxZD

cmltuOFLmyRsH1Ecwmgn+8OntFUjTxyP/Gp1rvKyRq
Z3WZufDMyJEGaznbD2fkvMdOkVzK/fX0E/YUxUSxwIy0HQersITkdYpzfQMkx1u4UGftlQVaqX3JI9/u

0P8gFtcbWdENIsJCo6wu2r9Tx4sdf4mQ6YY+SgNJSk+TgaXQl8Ed/20EFuyTvZO90HI5RhjMY9diqjok

gTAiorSW0ee0T/kTNnhJTzmKv7mqWlXXbg0MbIMORh
GXAMAkQTaVHq+KuxezIR8EWd5TtwCF3V9u7WCZ5xqJoPY29gGVFj299XobX7tgW5ecujAJlzUH6MZbL3

Bc8PHjiHftsn7kDLmyWYs8jI5gawtEVTQ6sjPuUTnW/GBcI+6sXWkVH/NKrj9YctmawVGsb1wIoGGNEr

QCbldcflvXq4hH4GGt6bMOhZ9iHOSA45BhLAyySL+P
XnDawilwZ59v9yhgWsxrosjJ/vxTlD82ZeW1gdyLFPKVpbJZu5MgXsVts7UOluwICcTZXUqg9kjIJFOq

z6aPGlVQrKHdMZ+vtTQnfW04byjXRXy6C6ctTS1PNFcXLjSvcAZDcDlLHFOk1ic9/Pzea82vQi9WvyGd

lV91TPq7BGeIr0CqRkoU/xwM7k8n/45ers1PZAUSqS
rl268pm2p+u/sPwf8SKoO2JHjadOY0CzHVsoLbkRnpvjFjRl3bfa0O1AS0US0RNIRtqSWmInTvyMnoQ7

[place]++dP2I6X48rb+zyAvAMFrZpm0redVQT8/dr4US9/BP3TBR/QfGGF75/GawSbiu6MWJD1EJnhWAyWsJ

7yex8OaDlkGqwJzGWxvewHCiWV/M3Zr3vpgspY3EuM
VgsIOOninMS3Uef9OubdTO+0jSGIvHq2oz0oyRgKyrhFfnpjeHDOvnVePaNzSRIMuk8BStnrVZfwf78V

8d0Y8Haoc5TJIvYFBKL3YLB2/3IsFYjKoRhE621BfLGXPclFPdz983ds1olIV6OK59pQJyj3u4eRYoQp

NrbUXGBlDQXGWkwOry23GNVDe/4JdRQFMZR/7ZpXxu
6e48yYwMUkYKKuSbWcqeH4f42SC2RpuP0TEAxUNmiirQtZas13U/NK/U4r4VWiJY8xGjo0iWXZBWBCX5

XQxIyEafjSrZ2ZOXYU832SD9PooL5XiPl+4EcRv3H3P/qUt2NemTLRFJDc17n+5buksfjqaYf3pn3TM6

Bc2k2g3F9bKuTdWJbf/FuK82rKJsIg+95O4tjw1dOi
6ir6ew91cjMo3fC8WG0M7MO43qoC+Rk1hsAM8ewQILKfucCtsOFiOcEFdPwk+/fPNaghRV8L/9Odavt2

Is0znaEEPEZ1nxFOhg+hxEPngyTs5JuCSchQdM2wZG1Qfc8WPq2SyTTRESLkwJhMLifF9279W9q/ndmZ

3Zm7s3/cP56/zsx5z/mem8FekSaDG3CUcovwckzrYP
PwIgJe4mHiezkj3FGeVc6ej2V0z933Cmli7zG+6WCFBUe+EfNB5F4j7RQQc96kMu1Ai62EvOcuhPBLQF

1JXUm4cb3TxMc4VUMoqRAY4bCAxGahn+7KJ8jTq+mn7Nw3u9p6CoFniwvLMaGhis56T6yZjt47dAvIvk

2gsWabWDS7FXeHkx0THmPqsXjnrgPg41oTgYzkNBQ6
SFBaVeBO9fWNovy/k+JqZ60k4w3knjz8Iw+l/ECimMe6DgzdK4hkRqO2RnqIgeNpGay4rUdoPUKu/9KL

EJaoprYWBihOJQ15dAj0Ty8o9CseWYKazrqX1mV+GbiCvppmLoFdNroSbUH93yNrlVism0w99dUqqrAQ

fzoLVeMUQMW61JclNwOK/0pQfkZjn0cnQsvFVVGAau
tY+0hobZeoig8L1O0624TigYg8XOe3z2gYi+L6cUYL/PAvl3XWGcWLMUjhH7wWZXTB8z7TQUdhw8Wlvj

Fe9o5zP3SGP+QoCu+mgPVOEqDNA3fcIRUWuk65tcWXXvnmADRBaCvm6Pe/Q/s7djen0n6Pa6HZCXKYW6

QI9dChc8VjCS92isxvXP53nw2iaHGLjNxSwq1TWL8s
Z83KWKnwI8OPJM9/vlG/d3X3/7I7Matbk5yCHkE6q2eQTJU8zgm4hwJgpngrc5/yfkvWu+/lpP+hZ1Uu

e1Fpbi4GilpneGMOUYJJTYhVL8oFHXNM0MPe1hAl0qscXzRnj/+r7FaC0yjaGFxtSpASfRwGLdD/rbEk

4rDU153CpAe5a+s70bTsgHnr96hs5jp1U9J1ep8pqb
hJ5/HV6zPVSRu9q/q2yPd1/zONXOB+K5qAaKO9TfuAB/0Yku6Rq8Amh+1BNUdNDcYM2fMQb8hCrFZ0DC

8r7Km5dan8xCBL5aAyCxV9NWYmbakyTuDy1wpl8g1QrpV24brU+Ws0Ub7RdUAj1QISCSe8jL4HZX90lL

TQgpY9QGddsmOFrcTrIzhcGRlgo52hn02lKWG0ZykI
9gdj8B1lvovYtOyGJ6MKxsTej01CDlqSC6Ppo51i1EMk4RKme7nmc/uxvD4UZvSZJ7BUbl0PD1T5+i3f

60XpO148XnPvpBf/hPPWppTZVVkQFSicrB/hzmpwkfR+awtVvCkae43V45CoX2GLjks2VqF38s+uOThv

kfx7BCVDPM4ExbDqrB4KAJfg/MV7Sbdpyp/MzMwYf1
l3hkNOuP6dH0rc9A/n/WEclGVgqv5msT3J+4PvfUpaxJbbqfSQuqBy/1IyQd+4ohQ6IlOVPQ3rFTIOb7

1d66nOEu92VtFc7bdHcN19NvJ7kct12Xo0V+a7Dg3k6m8eB4pD/24hgbaoxIOOqGqYPpGXeBRj1S+0oR

Pd6Ui5fK1mYsHN1tSAKdkNv0jNeOQ/HkTeU+Y6t1Fr
DNaXTVBLoB9V476tqPL/VYW7loyJ8vvNIEkHNhZlcI22OaZDpZSzsLRPnnZ2wO6t04Zx+vPPYQ3N/Egm

tLp9+9tLxj8PBrYgaAkKe5VgHC26BGM55z5w+k8/qOT+ZE8zwjH5S+AM5jF1LlgC+hv3nYjR7r+9E7mv

y75SaqmEkw+wsT1NZAtnCvkFdAolPv6/SgXzT4UNJu
VM7EbpoWvR90d/bojWRhSIdBB+pwnEbT3VVAg8dTO0OsS5Xqf6sr0vWK4QLPIdSmXynXra9VvKMXjiyn

nz8VhsXJUAZJ0f1SfJdx7HiEoJ4Y3Vioi/EdJ8AXB7oLVlHltrwjFK9yK+zXTYiWrcdS220nSfsdafmU

1AdjEzgiHbJgt+6IOyLyecBVKe1kc9AJKtjOeZGhIE
fcgQ3o/1FWJS/RjX9OL2uN71aFTtYdhw0hDoGyEiTGR5dYr5/SBq05Jw0lOlU9Zo+og6Q37RGhqkDuqJ

zOqtOCrD6o3chSTIxNh0CpVxX2BkI+TrVAMBV/NNgMrxg5m5yogjca48Gux3gJ8swke+Tt4vIDlpFYnA

TM2U+ciuXNjOIjQn7WQyZlGtnzU3Av0PmY7ju58n9c
xLe1KtZDFxY1TqX2ceLFRZAW8mW+bTx8YF+7BVME4WnXdflEG+TwaOmgN+38BbadrwrIq0RjYPeGNWnT

NrcMSqiuoKJYwrjS2or7OOidlUGZMZCej5oF/sAIWjVmJfTWuELoTnzmwU2vXLk2X4NkAOBSAatKwdLm

yzoBrnswynA4AW03s6eaOHGf8ZMj1B3JBQpcafIlZ6
GXe0/LCpp2sscPKL9WFku08pQGV91wtRF9x8SjsBEYmaVgin+u1m6eSOtMupqARGT1Yt2fKqE5VBCy/l

PPZU4kl8NjIO6Lu6NNLuHN96a5YVg6ev9bv3RszSTlVAFqyHQenip4w/qdq0zUl+iC8C8UIpScIyEdiS

9BDUEI1EVRyOxLhRYI5WKdLw4HocbDJh3yZzf3sGzp
beE3tzhewAxvpFVhXMKe2UZDYsFq8ukSghKNseRifdGS8UsHStVr07LMn1cDwyeRtQf/020tybt1oWMH

xGSLeNM76HwB/az3A0uEBeL8SEF/fnz9FlwbR4J5nmoqEB76NlXWbyPPcX+nd2UYpiyQOktRnMauHiwx

7zVigxQHeG4maM8Umw2t6ok5m5gybhEFlB2URjUKUu
Jr5XmaTJBVKtaOzlELJGDte0k68ufFlnB8fcRlv7uIn4OBCcwh8oMmkRwRjCYfE0g2D/A01iNnBY0Zv+

Uubt1nHtN1ZLExcenTkG0LHZmL/yQQvvFaFfUeXIm1R+nVV3uSIaJnx3+q3lnI84OaZjHo0wmS5BDvf/

eHGM2R6YmUdIwDdbN+nx0x7dGMzB9AXn4VQ5AMNDql
uc8IrCWsMW4E6LZgnR/tWZPVaEwT/zvwePqDxBe+IhRamobtj7D9XEO6P5e8yPw1iODxGNUL12oiJziw

zTv8bcyY/WBAkpp69I3IVqfDpOQyyqbEB+qrRpBqPui0+X5rRMqQ0yavBGmoQWrfwP3MG/tUZjDLhJC0

si7gAv+AALTtgCnbrHMmjnJ8YTvW+9Frdh1ytqQ+7Z
zuDpxAib28PLw9+bScs0iIN5O/JqLVf4eS7vQfBrtbpoKd9F0TLZGR0zgZS3ZwiVk9wLtNzQdzvJ+AsT

XD/q5UIAuNDUESMPL6zbrNdq+q2Qy29f0KnY3D/GlyqK3s2u0lPttZLg6/n0ywyuNFtyr+0GnxZdc+UZ

D1elnFM0olcIy0nI5T4omu89aI0XSkkQDcltzo7Sh1
fsJw482VQxVqRIPgs9C3y0tEjvZy+Xm5sr02XkkCeF1aDYOrMrIYGHJ5Cx0pfjKx9CBPDgRNGD7N/Gof

T6F8ahfLWsZtMZjva5prgUQU7Q24v5tRCvMolwccqum+Dawit/xOR0LFF3xU55jks7p0W4ZcX0hYv8wiS+

D4G7y7Olt9HLZri8EmFiItF1TGmYRBqwaQGWVA5VMw
jn7hkNJG8BnoC1joa8FFjF7UVmfROHJbvQfb+Fqz+18z6ddcDNl8hq7tsrz0nazSZr76JSOnUU2/it2q

K8GxC2TQzibmvQjzTqhOTlEJ2jp3u1XWZuvjkfI8+nOi0ZmibHhwrOzCfPbmasXwR7fYwzxcYu7YLSlp

8zrVtfyv3Q6+0LD7b9Gf6HipnsN6Wjp+N1ni2YcfIg
Sux6oc3mk3mv6EurXkkGQI2E5RJMWYHUzRzamozUDgLsdjX6KAKS+GB28Gv9AZET1EfatVy2SsujSkOE

Ig3v3V84/Zwvz+hBGinZK+rp9yc5oZjTgKWpnq4I6bCb2ODbCc/h1kPUACDUNkgC9sGfkioGvzeKMPUi

x7ao0ttrtadFr/0eNZ1HJd3A8c8RfDQqQFk6oHU5/N
iQVvunI8ASO1sT+YVMgc6U+PY30HBWiXdHeteN5g6cd6YwsNpSyb9YsHgayv7/es+S2HC2rnAl/nU/f5

bQ0UlY8lXFso1i2kSzQ15EhJy+jG2XWaWZnMT15mKWpdSTV94dnR/GJoimAnoLXRKs/jePZBOH8Xu6bm

F6GHyGfeSRimdinokXBzoKaO8Fkg3jCzwDlhQxBmje
+oE3WOAz+4nKdNtW3giQSenj2FzDrzqhuxiLzyPCmlW8ZQxeXguKLsv5c4KSJ7gI5OtErgc+JYNryTjo

vcJBbH8j3bQpRdXRPvOBjLlEHw2wZyd3nqac2hZkvtG4Rl+SIwgmeAF1hDxWfFqYzZQYyKy0hKXHC6Mr

+glvYBhfM9VZVpGcWfl7qqqMBcW5ApGN8ikij261VZ
ZklbiMxUcpkInKccz33d6UDlCgyRfM5S2upJD18Y4vlov6zNC9Xnmv1vpG3ruVNQRLiBuCGezAX00IJc

QiKsj0cDx79HHX+OJmcKBJpTgw9ymE/z0g3AQ79NMk+n9z8WD1CkVjb+PIruaQhWgZlt3E8Yly3B0w1e

48Jz6T8Z5KYPCkQLoGY6zoFDyxHc3VCqWHNO4nAaJ3
I4agQgWN1+gb09Wfh5y+f8zhKYpQm0LiCc1yBBPya4GBwagg9yU4u0EAhRTRAIklpBgJtB0COLkGD5Vo

Al+P1C8aAak6Zm2xGGD1CTl5jg8uddmgFtIgytD/PpWRfqGkRE3yZF2/tDLJwKekhZIiAY0L+l2ZGT09

040UD6gq/+E9GBnpUYtG5tAkbxgmyG8oqYvlTe1wOI
fINwAKTwgngUtvHA3K4J2QGXsVTCts7l/lSFL/aZSt2IUeSgKo8or+CPj7XaSEK0zOrgD1dnJCvp07EJ

epwokJW7tJ3ppuluWAzcgWupaBdpDKYvyGNp/BN7slwWkebGKLivGy22kN0W57ZSeAPNsfVg96e/iEw3

cIasn/bPj8hQzAoWdIuMY8iTWc3ZceiPFxdABw4mja
48p78CVl20R6lbfYLEmWTl6yTYZ33VmzC/Rrh0XaWhap3U1pUqsgW+0DNIF+QkeX4fNGnZLGziXNtjYG

0oVHxSqA+O+rt0FZ86Ah6tOAmwRZPWQFJDFv0YKZvsuLpzOc0uLwg6zO+TF9P+fxV7XzMndiBMqirl68

MjphnrsoJOxnfFyV7bhcgxJ8FHctDFzfx4vhuyHG6M
ltAmWwfY457ksyZLgQKN3BNTQcQfipUvcVWezxnGPyeEotNuLvTivmnf5cikM/6Gxrtx9nreDbrErWH9

tIFbBLMttKsLLO5iXjs3XTIQ6fcdzUYPImjCYefUM32F9+DSwnnZhZL075OJwxBrdfC4S9cUX8+xYS+4

q4SgwqoZbfp2idIOYY0hh0drEHlPh+oweABn2SXUSc
IuMUErORglJ5ie1aIKUycdLVrlsu2GxQc/mzffV0+07LFkU6mVMF0bC3o2TqFBZbI0LKsSvYOeNbefiB

fsbsOeSFiEWc2YTzNUXZIjtro9mXoq2YUY9jjxpXe4foCNXH29TOEMCmQFdxlrGLskVAyRbK1n+BxZvI

7j/h7ZU4l9pfgZey9wRdye9NftFhvUlfUn0193VJHH
4yHQMAw442KW7AZ+uCYcQLh0cXL2c3nckGcml93IB0dUVxwoL1P2HpmjwrK03XRx/UpMyIwuZ0WBEa8m

PuKodCmfDD+EWF8CkcrkyjX7ab+wcN1g7lRKPj/mrCQHgj7K/IDEsZQ6R9a3m+odzBpkvvj6jy6feu9/

splc8TNRGJoUm0rY9VZ7iHfbMjXMZCEk6rxniFriXa
gjyhXaZpmPSdELCvU9/uvcmP5nFTQeUWbUOQqDrHX420Fa4ApNZWL6TTooP1JQtXjaOMXhvTHppdP+Sw

x/9NpTuXksi51HEhb/svKbX/V9YBApXAKQCbRGNW2g4hFZXJwJ8dFYM03jy9mpty+pR6ntFq8Oy/Wf7D

KUwaDVrqlV+wEGNULeM4/EiglWHpkLHmIIlDlR9fmd
wVLgHGxbWsClEuDl9gNfzl2Frr/Z4sDk7woGpGKpstQmkzG9hOTT4thm4Y5qSfUsGKzoZKB8MKzh50h4

ZVmuAUmkXOqdCV4hk/lVSB3V2Dy8uTaI0EN23N0A/kjAN+ia8YGSpZRcEfor9gCSWJYFyPBJIUAU3ZiF

0eATNPa0hbBEUh7A6FvY6lZNd+wPHv98NsrZYzLQuR
02BKcIlnatTacU1jr6qeqsTTWz5XvFOl0eX4Qa+wXs6wk2u1GzgFr7agQqqpKQvCgit8Rr8i9o8IWiSe

PppLlx2da26ZI6jxGNng6C7Z8V9uCb4ZKA8/PjpPfgYKMZUHOyK/WG2c5yYncbenmZSqtp2bX7cttZ2x

KnfKejPBo/jphKHtFuUqeZ7ZfYQ6z1h6W9x6YyKtV5
ngWkICEIGAiRWg5lCw6vzXTKCNyv5Adh74IXiarnJRtDipa7jmpovPmaWo8nzJLapuAfCZ893MHv8C2a

tPkbfxLuS+Bvi1jVc9EgwU7SuDmwIZwayJPeDmuJFfSlCMCLC7j4wzGsYKGsWLufc8dkomWycD6oAbUw

kfaECU9VhlI/PUgljhA1RhWDT6rTVz9hEBlpmxbjfp
YLT1f5NZ1VdEENEFBbC+BM8wrpV84STwCmsNEQ/yS9EVqiwGuUxeOt/cAP34/zOGOB2/stX5dw1EZ2Rd

OCeN/pRZOVOY6LWd/QA7mZk4SsoMrfk/p7KoNw+I7oXg/3oqCn7/eeL9qJkYEpl95StuyHvZQPJsQo4T

khSxtQxu9l+7fMbeh/uCGwOYE6pmVFTx7+IdxizTut
5GtOacpLOP6c8SyhzEN5Xas8ABSfmue3Wri2ZllUd2EOjC9KP7ILav/O33ldJ3j/7VA6Bfw20yAYxQ7u

2hiAknuwWcBdMjyT0Kjhd95e9yrhoxlExeUOphBcQE6adQ1SqMLqWxpcnEy6jn2sqYjAZCSe/ZAmXvyk

aGjcpYMIVewLZ+jnQzKc6jBKSZ5DAeRGjTC21t+j8E
SL2V0rvPsFuWJuHyJZamLRT+2TjPymQ6FS8A8zndW32kQI6a++ykCsmRFXJNTYf4I2FYnfM/kIlp+Gdd

fPeVlpaZNpwsO7ajBqMpxEz5ui5Y8yNurGfEk3vRgLAaP9ZpW6+ma97hNRWsPC/vxEEnxsHkk+tNWwCS

9ZFWhL0p+uUX2Nm1mlqCqvjSx4Vf1HvcDXWq4q/i5f
7xCcjslL4ixMJFwLyDyehXvaDFng5VAMJxfeGZra4io2XVT93jK1FVTSooz01DizSZ5Mun+JE50qBEGa

0Kld7lMss39gwhCSof3LlliNXXxqhSUqc8oAik3nuAm2ZwVO8jE/Dx48UpV8dyU7JNx/FFReWOocIGVe

474ox35Nc75hCSm7N/ribsq76CaScyn9P0eiRlSXvQ
uUbuf3G3IwK9SslA/69yaRiUyTq/PYlAfAUhFRzYlDCCarsc3HIjx9+zU5ykS4fQJGGQKV+oIz3t3CXY

irpkAmVPo0v4uGBgCn4yTKHlvHBprHBkerx8TM5bYJmxaYmaluVhpQufGPw+4JQFqlwS9Qe43+4tw+GX

wLna/ThNPyzKNw8Lem8dhjBHAYnEl6/kvOTxJdsQif
sRGRhBFsSZXlfjhpD+cx2NK5S9Pfl4cs9Z4yiF4SfdkAU4/dyQc7SnXT5Tuc3ImOSTELWGoCWOTF1iOW

EbBI3CDBeqLO1EmXoVqdWD/GvR8yUBg699K2lCB66H27n0nErVWy374dL0InrMB3eqrNTJ04eY0pkb5w

b7dEdQeX6a6oh2jiFvNddY6Zc4mRYTdM5/AmwRBFxI
C4UyUoOFFRO2ptwPOU9U4MruPwUKDTzDu/MFxbUdYheqYdDmt3nz89DBbYiMb4r2/D6C2vzJTVeg8+1c

zmK7iClilAAzQo4ZTRSCwHmeywu7F36IFsx4Qj2q+Xld8VhRIozcNPS4F0OIiSto/VBFbAV/uOwA3w4T

c3AblIHWdRxpqiA67yYnyC4oMqNkmeJWn9zkbk3HlN
pqCFZYVMrIKAEojsl3Ag70y0QvyuOnrU2uQLf7a3TxO8RylCRT5sA70S0JxpDrlh053alaEVrIesfmpY

bLQB1PBGK9gYJCGZNyCXLuYWf/K29O6/eFv/e3r+jptYuU+NM+zNIr4UjCmE5njLRWEupvb84iwMo/MX

AyV4D3PyFOqG37LEn91AORKME5EI6hgIm0rx9JsGcE
Nz6IW5LVaqM1g+7K7uogknahBGAJMGTT/9Tij7JKFJJEbbZ3pa1y7mjLXVDFpCXVYnoDwCnKzwWNZX83

QydXUXLPCuuxaZCGp9rQ2lxVcdMGeZgE7zwUWHUaA9mLwp1JKLvQRENz+zoW1Xp2Py2IEiB12eU1H1Dn

8vB3yiKsERCJD+leA5DdGgL7NeGowRr1H+jSrU1Y+g
hB/ynMf00z9buNkMJQyHcyCxveMgJVnusWq8R7+VoanXaOaC86SKtGop2bTbN5BFkp4BhZC2woS8wwok

mqEnAgl1BlER7faKzRR1rjYvWF5cbzNdt6QW0GlKBhTrSN5VsxjLZxD7PG3XS4ARON/S/dMxHZQ0uy1G

FoRpqLXOCI3+qZNavnpC+egxtZb2fnoMTfKG1CDmNC
REqVwl+bO4TqNZqiLOr++ZHvzlrB0S8d+uxC0CrDJdMoA2eMpwMbS/+X2z9S5jPFDgvmGHJGt7G+JzBy

Uv0ce32LORhb41BqGjmbt0/jeYl0JZYTqj5D81PzCEcHfk7j0kgeQbjFwTaBD+eWzSpYll84yX9vZub3

k0kX6Q+UuKurmVI5iKS4EHHKZStq7Pt+8yWA9eyOru
oZTle8eECfYQfKQftxIRjHgrvReyXbJJGfM0iyXy6WFPbV2lS2bM0dV/97rG/H/aMydDXBgk7D9nLQPq

1VDoFjEdnQmUDTB8h9hNxVEMV2lmztj6xCErgbp3WbV2Rr4ENxKAFo+6Jc6X0id3FPE8B6rSKUXGOJVR

LaFe2yKFqFTcKqC5QIoi8RYSGJBpeb1LVYSmF7xHKO
DotOsBsibKhmTIG9Lh06LIsoga+pLBG3b+7P7ktBSBqPZfNqp1zWPpkES+JKuOkG0k9LwH0pMcNsd3gX

QhJrswd9c8VbeX1Q8YBrBSpf4XWXJmdZ4Qu2+hJReNLxF6cE96EwhZrBxhR6P0nW0hPbM1dx8lXrsPrf

pQw0f7RyPdvNlVQGeDJdUupa2VQeEYuz+SNo1otzrY
sbjSKMluLqodYReBmWWanXXWBY0JBMTVCOp8g11QAz//I2ntO5YcsL9r4duQwU4qm1GJa6sjJD4ie4cU

dcqmueo4A9WPYjd2Cbex0sy2baqi8CxE36Iga1kvFa6yEdMmGU1T+56Bxm0NwuY9GrP+N98WTiLc6L1k

C9uaFs+aM62EVAUT5lyTt3WUegWfgQL5+0Apby4YB6
npzYVDbKgb9uVjxCYjy0vZBfuYTbtdz6IGKVKOySIpjGxUy+8Shpu25RPI4+dh4uYIoc73ftue4UIcxN

hsv/YfUPYWKNlLvthdq1WFSy2ADtzTh+VMIi/nSLCUrzE+a9XRZGdj3rIfpW2RA4xc+fmwM9tTTPvfw4

mfQe1dqpQiKkxwFPbcHwyk4ePL9cK4YtHILS8z3c5z
eZpR/eDEtaeCjdvKXZ+LBYO7oAM4zC20PCGUuzPPzL2Eq+dZJONToQqOKQacgVhuJt+yVprEShfADZ3N

uDiNJv4J0LYQhze3p1Hxn0T5X1kC3OzNdb4xQ3oXvjTE4UsznfIRwaFXnFj+lAj9d4m/kOhBvvS/zkuM

mV5tZpr4x4NBhkjaCBE29VhPkv7UqOlZ57ywJDuk9/
lnEgvTHD+6GCk7ikFPaxdTrQQjI/pdDDWtBgLztm+f2YBEyolO/TiMTKekyo6NnRS/F1lh4wHs2Gm8g0

uCkaJr7BMiGoOjSYT+Mst65Mx0lJz3Zb70DTVM3BTc0aVYX62d6Q4rLOzqDA8Fu41/2TJZJlgmOsGZ/R

u8VdAgQh8rxz/82pih2xFVVOiPFYvsTKKQ88B2BzbB
UNb8BsXuvF9iQR7bu6HcV6D0qu5t0Ull8tGgR8X6PAQzZQ2tKllYHL85jtpRyVYXXWKpzRacfkrQPMxQ

WTB2K/dvlm/fTf5iejYMoyEW7IUtJZaDnhw2VJAYRYV/dCoTGLTNMvuWGzBKt2d2osbCyGTa9mRmqAsI

7haMU4zLva0FzZT2vKOKiPDq924P3WUJcxzWLcJeaB
l0+1iQtB2KvQNlFlVIyrPkq8G8X/OS5JVzF9sXpAbyWpdEVccOn78BRE5e0ZSOv2BJtQSKS71gNptvi+

WN63fZVQ7U22qKRHxUoUUdS612yos42cns5d22cuja7IgPu4BN3Q0ddmoFc5Pm/fB7+iYnj8FhF5jPkE

Uzqe0huW/Q5jLEtM9nerNnyydtpNP1NdHtnPuZovMq
PX80APzdMCu+6a6P2ee7mx7A3NXDkk7SdeZlHb7ye4LSKpB4r5gONok+OPMtomy9MYp47bCbijV7sO0P

e2u3VaN++/+YflZxTqRuEaih2e9f+QGLq9WHw/EpdVtKKvZWfswoSYrjTfdPqxeKiX8F6h2oFQE/m+Fz

g1aDS7EpdMFE4sDkatFqGf/BztxRvLuIaZ2R2sOyc6
07t46LMs1uYqs/yuFYhLaJeswn4Qr8BNpz/RU1LbJH3FEy6TJU9OTO9hRT9nz0W/18S++OI5pVvbIV/N

wLS7rA79Pa0mISz6ZnGwgoSyOpCZNEXq7IutRKez0hy3hEzwDLlzROP8+eEUBjY8qcJdtYOuQAd6wEF8

w+X8i41a5SMsyTOom4Yb7fWJ8Pm7OS2i2OrHCoPMpp
Ep8jKrZdzg18CmB+JWH6+VviLha0gmIPfMCiq19NIQPdHkwvCakZJs8uy7H/Scpt3XmAox6kShJfcVoQ

rYT93upRUvtSitNfDSd+ZYi0QQJr5sUcC0cZuGbRzeb5jE0+hm0KEhq+oyrGujrYdqBPqkuo+JS4s2eP

APVx8XwwPR4dnrR4F9ZGn08mmKyouUItiXwCIOzdK3
zhRYdwo4LGuipArdZu/jh69+7Q4JeiJ2uQ6IbDcrOmXOzXjfhYMw/gz/RyBrt3aLiGwpm0E23HH36ffh

esPwzYnSUyAnk4zPYQ7eigk4zewRG556VddmbGZV99tBwh/WJLI8Bf2LgXY8SPFE+3bunCODYxvv7m9l

7K/kZyGfVBpXsteNntohAt7S2cV/MpGzLDgLBJqX21
UZ8X3SoFO7yvFhBxCBhxgA3BFQ90LOndnWWzzEIupgRoDK1KKAnq6LMmfu+Zq9owU1+iepZY0lVj//25

7+AiQK4anw8zFJoQrsE09XHwb/bLpWNm73a8jYrKydHcZI6AWGkztQI/9aPFs//vBwt1XqYPe6m1z9oE

jmyt621aiRzl6BJ2J4ORL4Jpwq62Iiz325h+3iOdbZ
eKYTdI6qr99fT0gL4ICmX1tYLqWWKDdAkEiBpxVzMBIDFqeOE1pFqzGp8kYlnnHjKW1Twwg6Zr9V7Di4

E8UQ+/UpTxgTZTs0pI+dzRzJ6X2n+JJ99cxrIwfmE3RO9mG5yw6cQ5BCiPpBwt1Nk4pZvYEJ3K4Hcal/

BFRbe8Uak8jw2tRvv5O+u4dCo4ndbq7zwObz4TrsSS
6tBW2T2BwCyWO4Qbnrqwbq3TlKK/US1xxM/0mA9czFBooc5BCEkZREQhEIT/vERmODsDwiJrxX7u45WJ

JfnqaJF+1EbSX/L38YqiWRPuW9boXDv1FY35Dnw+uJN5ndkbr97FbUfHZEktoAQIa6utIoAvjWGHoV21

/mgbXyazZf2I77wJzjBnp63588qs5j6sDN7dmMgUsy
srqvRS/6DfBEY4VAeKNGiiD6g4y9Qknz/Ek9DICnDWC4lTOIhhjJ1HkcxAOMds++TIgK9JE285gdAD+L

Lsw8EAvjytvJItM4djr8n/ctHAZxP6S8/ibeDwGMOMeWtSjngxbxESFGu0ABY+rzowNFpIkeOji6x0Xp

n+cJJthPhzettLvC1SRCmEgCoS3ZYH5nL3mst/SMst
59id4bBmdoDjk6Gg6hU0gUdqIyQtQPryN5TGB0JSMvfNUmQc/Hk9cDgdav1H8DUu4HSp9XbNOD+ERRGg

N3nBvH+uZ1i6SmBVqewa90FdfQ1DjY214D8t1rQxgd0rrDAmvYKS+ZZL1f8IqM/UTlfmYP55AO+CYQ5O

+HYez0S/9Nb6Fwch3igNWi/8ha29IgrChv23z2j2qA
XGqzuWrN/9cBo5bKNLqlOKDsMjPrEgNHthpiV5vTpudEFubGEK6DeZl8Ga9NuCN+lr1OEL/2KnkiXW68

h4wZxYzk1aF9D7FtzkoaF6mm/zjkxnh+W/z1/rrq+OuFL9PnhaDOVBg6u3XRap6g+TNSllDAdioAx0E0

ZU3hDHeOSgnrWlCWHCzmf0YAHd2b1+qq4n6lqKN1a4
Fxt702J7BDiGCwPpZ9FkHXHulJsBiM68HUgdcTXzwtR4TH2aI9xl/s982OBhm5lb5IMy0NOsCO3WH2Gt

HdZCvbtdSSfdpEzJ0QwXYmZWRQCXG1truTQulkJLu3a06T6k6J0ztq6KYpGoHUw849KnC1jbtV0ksUKB

JBaK6+LyTm5t39rv+jJ8d8LI5pYhneVHD5P0C5EdsC
wSUbVtIBth5rmi4izIsEjFiODXRGagIJ1fYK9z4KJlbGrivayxFVw32LEbelVT2CU63MWkNR1amdHiOt

e8oLVcufBrjICbkSPFHH+XvHc+G0UIShDDFj+35iP4NZcGWKTA52vxwAQuyDc7HA2+IY563oAnio52sf

a5oPbnMkmqXDONbHfjb/+OYoQ79w3DXfwrvxzTUjig
cYgn/zfH3QBIHnbgs/V7a7te0ll4RyCaijCPjb4vsVInX0VqjavL9Wc3O1U3TVlEFMIh525ioer+vdWJ

Dz+xUT2JLi0y68RdcEr8FspYawbLT678dZ9VFMydDPv0I0TvpUYRKR65PVCwFFL2HeScktV1kfGajAlO

att08jJy+sNyTVYyM88fIP1R/w7YJ+LIaz1DHO5G5g
kT61P3kbErJnmoHL9aXSFWJwPeRvKfhwGC6Wz/rxfkx9itXprdo7S5PD6S8cbTpllAk/MLXix4Ao/Ygz

yOR3IvvdoNiufeXxuCTEuRCl9m7wJBQrQsBIme5fMX6w82MuMxA86yTafVnhxfaqfa5GT5FI85gC0S6D

xLpiqVvvD/OzZNgvk6RTkQJucaBzA4+ExgKKug64/i
vvqASTyAlUxKDJJSdoYRf4v+B2c2VneRKk4wXc8GeH/o78GdND9cts9/qY0wZ62jKqyc+G6TscALy+jm

gvTKay5gueGeH/85sFOrVcpLcgl4g6RC+TROn4FRL4A0NAk+/MTXnpHyZsddBQIUFo2bOm6c0iSs7q2j

JU1mZ3j/WSaGbvssKqEZegpItN1BD21D8MUADu0WVg
70SPQSPwGnY/Om2yHSO0px9OdxGph5894TKndouKzGSiCgEFbg9cKpdqc9k2gIA+8uncTW1DFLxJ/iRM

CJ5J7gWl15Pl/tnNihmOsO2Az2vi+0FMqWcW8nhu5ZVLXtvQIxJ1un1SLVMAxiwu134ut4VfP1WVroO1

cU4iyxlToaV3+wbFuraaKLcMxvysoR8GpPhAYPMAj4
dXKvexfLgQ1R41tLyXEduHIe5W/OrhMlP6/4tDrYbcf+dgsB7Hwax+uOcJgKhH40HqfhdvC8WRTmGoI2

j3UevdJQ6tHHnwhzrr/fWP/mn/R5+bWJGtWPGY2TsXjfZWvwKK/pvn2Ec6VonJtq3KjoikfmZ7+f1RRS

wkA9YuPc60k7GzSz4zB+2cZzMRGCR2qxoA/Zfiy6TP
UMYNhSx0s9WkFdNCWwm84otSDheTIl84ThdQhW4xcVGC3rMKHY0vLwhT/XzIVWWpNpRPeVpTVvJ0Iiup

z+Bd/5/EGKUGl6gINtWaW5x98G5m/YY7KMFZtaefnmw7GkHY8g58y0TuikBg4RAMPATgZxYSDIfco4vr

ErawHXBRVzQZAoxJtd29U1n51UHFx8Df0cpzDw/Owi
OtPepdbxXlkWzk7kfgH3YDbF/KNIvLbi+yyOpQyXpx2Y45JUcWm4rATCQb+uWX0aekhvgPuOMn3Tl5ad

YYvl7oQhi1zIoe8+TejkEYbrl6HbdNNtJ0ZPKAmeWgMYu48cqsSVRinEi70dvJDX7PDfxPDmX8e14btw

ow4DQL8YpzJIV9SZ3sr6etea6F6gB+JtZSh7AB93mY
LkO1k2QAr6sQJsYxIg1JUywssJk9xjJr/JrFZONgbWzaTFIt8Tq/h1HsBMWy7ROnWidndK0cdMg5qiVN

fzJ/B+zkyAMmaPTc5vR7YcXivchYexwpqOspuLyyCpTamrcKiuSCg7HvKTzJCbufqGisZ0HTh2MNKS94

2uqtGDxeo9YKQCjJxJBm6yCA+Y0uv+1MXoOx+iwZRE
I1t2MumHqAwTYB5rMbxd5Oypj2EIL6P0acFTFPun3xrOfg0Ue7t2tDt4TiQ1mE3ZfKkc69sd39CvTnUs

yCY83NMZMigijRpa6N8CvL6yf+0dKj5rhkFWsEwjccRf6LgXWlPblsDtDLxXLP9k/8dqggwskFFAs1pp

FhruQeAj/Gf8Etq3eG3SGnSWTX6wDKoBp5OXPjtky2
B6f+9mElUYHTWLsMijUfXkZKBEvROdDPCmQYfT4GLNyWr2mxUzW0yNhgcWyxLvK0W6uLT7R4Wsxv0Wdh

fnY6pQRiTziW+wUbNdCdAP7vN9pM4gCuZsWH3FHy4mTZ8hc/IS1AjcLZGeRL9k9aUPqVSmmyUQi6dFd1

Agds2cjsmPzYJaWOpQNrEfERmzKvIjwOggH3tM3UlM
9bYlXRii8bE64O07wbJIxNnPyLq6WS2CI5GHCQb9kd9CLFXj8tFuz2/+7YkFWyNq5klsb+2Cr8X+TL4V

3LOfF37N+g6srQGXx2cbIx1e/GVxAtpM19aKfmHkXkKP9kauG6Dp0Fik1czIqZrx+neJZfhQoVGCMwN7

YkyB2m/R4kXDmaZUklhGwvtKZxsmJzcymnuzi3U6cq
VN3vqgk20FwRCjTDbmCV4SSTkTq/sC0Rn78vyeE5Uv/oYVsvwTc3YADtp2vbjJpC2pnyrGQty5rTqG73

XtJw5ari447JjbXRKXxBB65Ulac7NRbtJwvkGWCdThCbqCh+aDLsjLVU539hSzdlO8nH5LJbFl+ACC6k

VBDZfVNDs97o3jw0xEYFtLZ+zM8G2Ag8NibV9WPq/Y
6epSCz8ZvI+HXO3U3yFU593prfHZzB86hIwLzrGS5OXOqAjz47zjfMw3qU7rT13QRa+mkmDzzLChnlyF

EYscrGwmwzYNkUgckjXfk93tqIW5bQziU0loHOEusFoygvebQ50Za0K4CUngMRvkqeV6cq/MDtQxKFZy

4qxo89q9TL9i+DZchBt97dkiNIgCi0mPA9Pz4+Oa+u
FEQonSpKmb/NJlCeWKNo1SyuC1oZgKme4Jl8ldNiMP1WpSlJqfk8ei52GssXZcTQq64BgcYPft+NCUBi

B3BUNy1N+XQWhNcecArL0qcIvku1ltwP0Um3kc426k4DsXXk7w5qEAeDBodtziHGufRpfvb+U9Dl6S89

Hnf8gl89ubdRK33/Ef767Eya/fysYNxiPHQnTehXqc
DHqdBMJOuLlM6/YO3bCBb4JLVE09l+Sc7pUAQRi6elpwxaBuf6YwNHRTjBcd4AI4PY41uwUU06rnp5QY

f45aHivJ6eqZxh9NjKfcMQsoM0gN8MV+AnpW1E0jRF470usuWY+Q/CJ9p6Sm3sIMr6/apST6AybCZ9/x

pnlRPA+i77489ARfXKzzgUxYud0LSfeDL5QmjqtXIz
iuXCTa0BzMnTB1TC9srxwUrTlS20VD3D6TzG3rpyFRO43rWQ50hXMq7XgJRgXHuOBHczI4KAPuefQjhk

XVC1dDl4agRKXsg/Z6X5yFLO9a3aD6qDK7Olh6zB+Q1jFrKAaEO6mcgyKxHv9T/A34Sut0vsAHB2rBZX

iU6acVPiC5xMG2W386M4fzArWfWyKF9MeqEWl8coYs
er77sK8T1g8uv8f3XlVIA1NGmeOw7KEDI9DyMaU74Urli7xrksc7F4rN0qx27ZyIROjqIU+oB34tJ1a3

pc9g1wVr7esBArLGmRBVY0aDwT5PrfEWIUJvd5SLkpb3Qirz8j96FbJ53jiJB9YOU9ehkV5CPIDd/l5Q

hiaLTAr2dWnXqmQSfbpf3Ohxqq1tOofXolkqUiT8Vf
4HqB+qQfuQoU7j6WreTtroq5xDx9gnkVvpM/0GKYLceKiQVz+FrPST/v1fCoH/VqHhouwGeZedZJxCRv

Hynn/Pt6bfkcIXUIOaFy9tnjxLdxILTc/0qgr7mBMzPrZSu4CsAZNBFCHhKAroMwacyp8I5+fCmpHVv3

3/WIP7kmR5sBMdRb6rU3lm6bmLEOI1d5Q+3dHMej3Y
htSlGxfWyuWTLC2FEmWMD7gVK0N244lM8LNUuLRWvxdAL85sjyHad45k+lrxywbMWhl9qvGtgdlJZ+fL

W1pMX0aM0DVxRuYMqEzgcaGNAqaaoNW9S75WiX14TnPRyLEBWY+XmjE4eL50NLlUxDtz+zf/zhvTDvZi

cAlMI48r5b3w6hq7HJ+VRrKDbUd/6ZOeai3zu4FXmm
Bc49BsMrj758DwOys8l7jYqdbzBwAr9yCUg99Dkk3XmwNX4UULl535IzBzLCV1I3oilGdSneyPmJ5Fdo

S+tYXN7LzQjd7pOu+WxFiYlLlFmcOwzgMlinCQdZhTrcAgoX5KIHBrQqbND1IE19ijUgbrLj+GsE8eiY

f8FUGHiOYOyDEf/Hl6MIAU27sUyOrm+frg/jfM9fYS
chA5WuXvA3ezAvO0+49kj04RXXCBRkPTdcLaDMoULg5wlt+Ss5+QlQDgKf5/huf0b+arAT/UHnGwJZjz

6RnrxL6QznAe7zs6AEhVJESSwNh/58iY/pqNKqlj4es8lmcN58FM3fEnuDGWQv/1zfVCo7yX3OvNI0kr

TQH+VXmLKHhu4awjWqgF8whjr9vaqE44u75j6HYCQY
TWx2io58NQxaYf1OYu5AYcJJgP+UE/CFx8pqJRcbtmawXPzZK9wV3Dgp0phEkC/V5v2N4gg7k04Vjnv0

VKTYToRh1MIyXkBppU6Sem7COPigQEWUMO9N6lXh15PKovrrHoXvEExjgFSCn+IUrgXv9p22DmaPpSGS

gu2IF2Dn7LVBQb2xeZ/cJ3LjH4XvN7V8Bc1WK58VpS
spD6begLFDBtyxSvQ10YgSCFvjgiXCt8R1NyOdbDqSz9O3srdMFgZryHsF761LVFXtPwcNECoi81D0S+

U5GwQfJzk5rLatEnS0S2uGGKrdbQLcYnXs7fCA2wfKU+9PZFE487uDNDI9LuTi2hzXDuB7ZRoWdLssYJ

aUcMBlk8xmtBiA3ZKIvv3q3iRqIbmhfd0FC+ZGuGmP
IgFXerTaj2UjhpsD0w8f9GGmtSlKVwNUSKFpyU7B+7BXnevfzQp7t6jXlgSP+u19gSC16/QogR/+JSPU

WCq0EhuCOcGtGVNnefE83jBxwExtwaUMU//YL6IzfTLju+xOq4EfXdcw3Ysu27gkqwBj4dvNUCvzq+eN

1ROZWDyX+2WaHDHEM1K21gDPllV/6RuATf6LBltcL+
bH508ao7q0SMfT6H0VUwGfXQm8Ll24daFijSw/lX3WHmNiELOOcRv57taNBYrR5iHyXCXsGY27SawWw6

roJZWx6Svr2bYZzC3JWeIh4tP/HiPBm4C/hV26AglXx6nEK0znq8Lu34b/0R39qb2v8sU2uG/eenhtQp

OLsD1KiLmxJAQK61+3muxgVEZ/doZz0ETiJtLzxPNU
Mg6RGbvECX66mH3ItCxykTS4pPgr9IPucb2Y+XYtQJtPhsjfQvYWKlrex2x1QFnfWpKDZmMF5mBQ55AC

QeSQ+tQWUpjDcNo8XHjCowqiiN6rwu2rvH4MUYdVt/+VGBjmU2SIuSJetbVWm91bhlTYS3ms5zaY+aFw

jPfy+2EGjWZvy4kXuV1vMbKV5ea0AnT/qWZN4ZIK+J
B4mwVd3OljpViSj4jdRvsPsxow04QbjwP6bSt6Z4SLjQ5bco9NGYZEOMG+dEZ10tyQRSdtJxh+oTgCLI

INVgihdz0Rulz2nsd8p5RqyW6JGMqnkO/6pyJkf3w5NwvyWQRkvW7FkCjeHCwYWSDZPfnN5rpubge8JV

aeY7s7Lmn8kcN0ln9hiQcbz/xwbNihVUHSJo1LOQRV
Op0RCxOAjpnEycX5M9fwNVk7e2QeqPqgkKCga0FbiYXBW7feUjt5+pG0Jo2ZEUbydtfjo9/rTjtqmj2f

4Tob1mmWyP2JoGKCwFDAAmZ7bhUgS/PSeYThkfABtUVgOWuYdUMjJNAswUKSgFZo9LmoykixLYCqDPbr

ozjkCmgJbtPmOxLFioLOjxR3onbblxx70nZy/viBeT
EYLkTX+oMmMwWexjx8M4N1XJwvva+LJVkr8mjtJWfx14TNmikfezEz9LwEyL26RI8KLdKb5rArioYt70

U0WKZhqbAaJ1cW1XDlCLYghKAWQ66nL2Lk3Q7O7OvVd+P0QPzhBcZtz9wYOJFyy46Z8XnKhNMXt40RyP

BWYRc8xf3KOQPtZ0SywYicse7T2IMQKbqoVItRgPjh
le9/royyS8RDUulFXF8yrSC1YspnWiOA/FljAOcTFf2DS/m76R1jWNhkv1VRnX/KDc+3jbfw/8OWxOfZ

2D5cTO2cXueGq5ci6MZ5h/zr00+x2yjVTukTHvwVdnZ+RiYvU9L8XKdkshxUWpI5gchBTBcWVS4DuRyK

r4sZNsM+rENlaVUj7aoROHmMx258RxlzWTY5nUgBUf
eR7cOEX6ZZ76JnYWL/UUQEKAoU1OMYkhGa8cF31q9ljLB0sROVPDg4+13te3m6O+4k44aE6YdMtZbO8a

VrteToHe9kYabX/G76DSSEH+Lus463q+SdJSQqj1D0LeageNvVmPwDlSWkURuq56Odi0Mss+zghRSbSZ

GMk2KVVf7Tvc/OGLIWeEKvo++vzpwdlUUFhRiRVWby
Q258j84C1kK2fvRgVOezckVbZ6K7/Fwq5+BGHm/t59BAMbKwtMXV1SoClXJITTnb1+1MNr3aGBe0dq9M

iSoxkC4CBfZ7veGOiHOdAp8tdfmbkYx61pKroreYSV44RELWpeW9OlwBj0vIMYLs1paN3e4skpEa0DPW

S2r8JAkhJS+P6AjDTRi9S04oLEJjNyZF9XSpqo/jMf
x/KvmnAk4OxEnzpSPR7kqFAkTog90smkAafMyINQC1BpmaCX5co66fdAEsxqcHdXYEcsOO/F6GCsmMaP

d6/0GSnv6YgvxFkvf+XzIpgfx3IPYSgskxGYxVNrZSSlQ/ogT1rrFpGGnAXQhTH435lG3N0Hy6IUS8Y6

ZXiUDcTqfYB3G9F2hQ3TN13RohC4jlfLNt2BeVK9Wk
YBfxZ6cpkacChMX5ViDNkO3yNl4RJNUcbW6HL5UWUOWPYJ8GrS/jblxnfiooYq6vmF/gFveQy8HmB+aM

pKY0LAeAsjt/ip8C3yCScP+nrEbX/NwCQi5Cliq3QXlwu+NHjM41jBDS2CWT8zGQaxw5MTVN89qaHheL

JyNF3krV9VjpRTF5FHJLZtZOUKgVoxwNzSk9RxeXRM
T+ROmay/xmQ4PF+Fm/+9p8Z+5fGItBfJCmt4KtVF7X+2niQc2wOqpJc/oM9bqPjO6xBCMMUYfoNIuNwC

n0LS+cHd8SI6n823hkConJ2za0N+yeIagC+ATki1t6zCWi265oKv9feA+d4bB5PJfh8hRfA8dXGhtzaM

OlaMVhi4g655QgmiQ6VbHHnkyuscZ3iQJevCjzqtvq
uq+LQay85FSpR9ynOn2qV8ovHNvErqokIQmUucv1xWRGkMhyev1VU1NxodNGlPSRyZJGu5K1vVUBc6c8

BDgBVtSlpmyHWMsl3eUmoBeKT3079+0nELDQ83vpi1ofc1/5fXm/8L/wv/G0HlBHXtoXLXtVq/z/HU+b

nMS8b+5V0snn2k+iuN+BVaaEw7e+GjZ0lml8Ob4ZpH
vdDTcgNWgDp64H+rlQrdEnrFGC/tz7q0WtLJTnHA/uEYezO7D8ncm1p/8SkPAGJ3JX8/NAicUuEciJtk

g/eRAscRXU+Doisi63c3vLN2OLlQLL+mH0Ua/7HLnzWwvCbBjjSdJMEWol0MHt8LN9fs066YdIuahFVw

PaT/QHrx7LEFxmzdBzuupNWJJK3hh8+uzY2Gr9ves7
9jjNaWAEcw1yOm1FYW7bT0CFe239wg7/4+xIcfc9DINMM4v5C03PYJRqcRYi9M+vU67WitP3FZiG2jG2

M3WNFFHkOYgRS+lYO7ZydeMQ6uolfRX7fbFfsPPKWTY/0vEGMEMEvmqLLkWwqoEt0QP3lrg9HRwxKe77

hSIU6xXvVwmc8OvU1tUl+yGFnxSFI6XK0Ejj/xQ+Ua
8kJNTIcmZjJbOUML0IBZNeLnR2ly6scbTrH+REY37e1Y0R9PI50+avDXLpYNrgtUH7AsbkSGqatpqs1z

t6LbQ+YttQ8wJ1BTk5k3XH7TD37ArrbJyYNuZVpNAAKWakY8VhmThshNo04CXHA14IPAkvl6DG143Nqv

xeWSQc4m/CPn2rFWnrKYVQsAgF9SoyQLyKJWlsAT6X
A220KBNSE1bbrETouRLOkEm5Ov8LsmeAh69YWpCiHlrAM8ZzTDQH4fa/4KqIm8RKRFGXY/8X7QSOTR3n

T0go2Du+O6qoUwaY1PJNkOT3brVLJVmhjwkpS9IuN83RFfoprfJgaOyczoEb32G/AQw6feO7rfVrY4go

LCfLsSud7biUtgefx23nS/aLqKZsFAReaY0HHpBv6s
o9Wz2me2hJMCztTIsjYsnuotSNZmfkaHIqPK37/XMt+Qx5NZi1velTVGzlHb7TDQRKxJ4Oz95+olylpT

6IcQRZmEnM/ptm/2RY4K2DUFvsfCy35T62LgolDYsf5nm213wN9wbsCkhDM/963kYYmbNg+iaxUnlR4B

sGC2fXbD1q3j8chgMyv344eCdHORrygee9pX9xktML
ZWCX0KWAx/usjDHz/eDwR3t1PS4eTSu/1DYNazvOwey0QoHBClyP0aDScRSmNHcFL7NO5hf9wZSmeH7g

AMOm62aHH8/YW0a0gFrkELBfZc0d6yKw+pScBAwn40/GyL9oo5D1nlrfFKoiUL/u5ErED84o+12Fvind

kdO+35Lr/cFZo92x0UPvZovkxQzx5D6j5qHc5qnKRj
d0BpqkW1RCL9Pq75apUIa/JTskYjmcTA6OIGP1wcD5PsF7rGemQ3NNrTKX8tOg9G8RBl4/iP2qP00xNm

MurO9InVyRI6SlvnaxjWHyWIAy5wKnjKIvnGeWsGTxeAUpINCN81VaSAuudsTTLS+x6p6GAVr+X4oerW

Y67H337TBKvQbhqDaC1TDqixd0hkIbF3S6H16WcwC6
RY4wnYXYB8kdJ7Flk7fGRf+2JDjTp+MS23EgBFjYD7CRcsObcPUNKHnAiWAug/tnPss2VYWXpevNRLnJ

qj9R4xNYPSRxonBEI/YuqQx53/dz9rEVY6vBe5u6hN7guqMw2SCxn60OPTTF3AQcQ0cgo9+dpWdSrfAX

rir//j32S5PWEZhHFO6FBx4x6qxpcnvDXAyk6BXYmY
FFLwtN0LChwKPQACmLveiQFCHZQiuwIip417eL/gw3228kkrif4CLBq77HqTeAuy8PMxMQDyGDPTKwhr

43A/Z700cI11pEl/XN+K+Dh+pvQiTvOlsqQAeP5p7cJe8IOE3QFmn8omYBK4XLb/kOlvOD9Fv1xNkv5Z

ssEvvwWze7Fpc7P5gVJiPUTpnVpCKZKMoArQSiTHuB
xS1/OgPOXVAxTuo4yNuWC2Qb598q/lMEDmHyC3do+q6BgQxJdJD4h1U1jJYm5TpC1vTHg/L16nO5L4oY

wFsxaBf1ypSjvjIJK0JsJtAhBu/DL7YDaGGYHQbWW4wxR6KLV5EPS73I2428t5nu7I7VGTbUOTkV5Rh0

6VX21+psRZOu80SxIvUkoy9S+ZtdpRzaL666TWYN7B
04EOHwaGbyBcCyPiLpQxTCthNtjQ1XXAJSL5gXujqmkUYZ2XBtJcOaoTWxTj+a7W150FpIyifPelfXw9

3+tHyTfgvxXs2sy1wco+sCDinb1283FYZXFZiORlvlXY9sGiEE8jTP17/PiyYd5YhJrloD0wjtkpClRt

ivP8XQ/ICgFwA6G32EVuf/M1b+t727O8BZmyNQFrW0
w1u/0jl9yiQnSf5AUK41Bx6ScFv9fr4toG+SkeFwzMDgnWIFvtHo8otSNybgQw2lEB99eLkaEc/BrwCw

e/SnUC0ROi1UNL/xdapZEVBeXr192nkK3jlvy/oP9B/4P+B/0P+h/0P+h/0P/fQf/9S9N/95Trlphaak

+T3glwYUyVVcVwlkxPal5Q9rSaU26CdeXev7G9b7Fd
3tPf57XWNBmqPFgbEmug5ld0GXwH13lDACUef7chE4iF1I4iiZCZz9ksAZbBUhmekEqzh7wUXhw3HIbK

eXpt6ssjIFQeYp3ETD3iBn1UEzurhcTBHxwD4G0xBqaTpGllMLk3z5hebwMTcE3jsIBY/x3Tl6zF/s6Y

9LBldhgFUXDghZmf86QIe+XVfsxr3sar6ILDiqO+oI
Nomd4voFW/UnZw8R5Jk3wRYfsVuVZM3NxACE5ecnB9Q8cR0poaOO2kZ7bvUnqc1JoVW2+uAZGcfyWsC0

VaTCXr1zNXIX+8efc7UjCvPIxHGJTEtDmZr/BHklZpMvkXBsSS9l5Yen1WjpmV9OW4X5dBLSsTu5JBAk

ybo4LoqYBykYpu2hrL4jtbF14lVWW/ANlinv9a/PN4
6cB/u6G5A4k+B5EpEyUYsVyPLiF7CJvdtmY8zaYEMNE6+Q3HWpGd8E3mY9UHPdPmGXlSLCrE502v30ex

Ie9cbgZBv7CortVupuniEBSgFvNdggo4tfX232Igva9MbUkeCIVsesb8Xqz/P0wUI0xBL7T+IftoFS4K

Grtpcx9ECr8UOvVL9gIWU+iTZHSupMAeVMyo32mVNV
E1Vs986A0F8xfev+ZsmA8mYv9uc4i9bKs3gm0GdheBg21HqVI3BBtdZb7AyimC34cThgiHQ2QzQvwm8E

WJHaozeYsqOs5faJP3Vczas0pcPjbiYbPrqgYfyRXw5HK39F5HuBmIiKhw4fh7n2Pibft5cmCXwQ237m

AYb8y2HC41hZl354IV1z42KOgPjsuGEU/W5+POdY2/
DgVXZbbDrWoPapkyDcmrIsFdTQchMBqncC33+ZvJYPw4x09d78oekruzkrCCfz0aKFQeymg76wbLWgpb

1VbQ3GQRmg9gf11zCAq1m5yd2stVGMpZeoOpkGBW/wDpsCs3yqV5d6fOSnPmedhs206Plx8v+Caazxyd

ETtGPE3fr0Yy+aqC10RGr9W4OjirrHQHoUzEJa0ba8
Wa/Uy2kLo9NxWJ3dktnl7LISzC/nxoYuGvw7W0njQ/d7F+ZD+vattJBX3q71gDoAvMZNozbWKlq+qDyd

MV78DP3KKhV+rp+bvpQ/N1hqLfaPg/yMVnbWiPaJ+ze89vLKy9FqQVmjvDiJUb93nBRr7cZW/dsfv7ls

yALY4JTA931arocG+h4VXftCQLiQaSzD47/diITj6p
YFPngxyb2tedJWTNN9eLDyyEY2/WVQ0O01MM6ZZ4oga5LCJsHkLrjUR2lEvh8kadPpSlcu1mM6lWsDwN

++14zm5/FpkvWDlMpMeGICFrxYKroASJHWhuVAjvNdEjuo/zXn+v5gy5zutTyH+eOy9gY4St2ciGBmfz

ibudoeHaG4l1VJ34tpPywxQ8WZKSIEz7fDD3Kfunwb
HnKwPP2yLaUuGUwo2J9ABI+K0FfL9Hunx2bfLvsZAY7p+ozEz3bffbtvb/elDaEAZ7O2b2bnM3FzQMJQ

PwtZmsLq0h5TegPfPvtjc3i4Sktj32B0NQams2YhMdO43tAv9scEClaQlktEpFGBrWZjWgxej+t2x883

eNfr3vZbhJuEfI6S+3jBOhEuhhujOQ3c+kyplC9CEl
s9XBZJiP6NGEiZdMLcnuJpqBxr/oZdt4LLwzemVEE6co00VXtjub3cDw1UdihYVaK9A84jXPf11u3lXG

xqnO4dmPrbenZjsbaRoNGqYNKMcPN8fHAP89SIxeCE39ceUSF8OVQO8Uq86NxysoQID5UM+eMg73Eaxm

9x4CbjE6jvQRsn+uNDDNzX5EWp7+QWtyPj82W6PgKT
XusxP6ZHLGAZFWEhZmqDxkNBz3PKrNoNXo9kj1pcsfRQGFszN/J02bdfwoiBSBl4rDEh3GCVxEIN5y32

nDxtoYgVly+xVmrWHujzUnhf+yLGp/b3tY5Ftbc0gQk1b1ZK28QhmNbPiU3BqD1e06QyPnCCs7VCIHGm

3nzRO+snu2jZLL4KQsvcc61D+R+8cZqRZhII/nG68B
r/ynJPsIYiK2IEmWVmWGRwVkHXPYVasLZQ7iA6nXpp4mFVIcj4OO1x8/wYohbHYlUKd93LAe90Gq+Vjf

8PnNvi+SNoDKc6t4S320uiti75MtgKdw+e5zer12APwF+BT/JaV5FtlDPBCkV9e0KEHsZvP9Rat5iCke

x8a8tReH8t/zS0DvbnIr9tbh0offq2CzA+kqTFx9P/
D2h7u3BR3c3nFdDcE9M661HJ3cR3O065v29kAtySpnH1a6nCAtr79AbqxY5NYrH2Cb5+QgANYnzLbfic

nfrHHMae9uYCGwpIsiqGEnGv67GvyXpXBFfh7PJHH4kGeJFkhuhfw7KXZMyrjrki0PZtr+/RUJ2qRIEf

YCg47EXMGaFYbKFLw8h+1mkzmboY23aJl1smGpitkN
WBiTLYztt1NMMzTyKa1icImZF2lJHT7ZoIvNHrgozOO5tPEpe3d12ynRYj95o6D94nd03MUelcGqd1/8

OcdUu9HEYtI2ybsS6NDxHGRG549HpTlBiT667xQ+M7WyTe31ceP8S5j3/U0TShnPibzF7mvrBe1AM3Ap

kcb+r07sRJX/UJAabZM1tQKpMjfhnzzSUcxF8EOi3H
jNaJF24r3tfLF8mGzlFTci2Pu27tVLnTwONBn+CT76rSpXJTcM2XCZtVFaWBsNUdX3OU7gmmQSHEC9qE

ki6GkTzX37t58UwXn4c0Dwb87B/tWyxeuoe9Wi1Bca539DM1Ou5kA0fszp9kqP5ciiQA6asjAFECPyCg

SPSwRggJo6TjEbE1WfhhXa/flXxf1UOAdl7fySus7+
rXvkjAEz+R7dxMyexLEqKLo8i5UhyWRrHtdP6gpJWwTBkLwlMaiVYEO771/m9fm+TfVdiM6zmVVOykif

lCmNuh5x+8ky0kwKrlFSriknfYTM7H0vcPs0OfVt2mG+8Ke5UQf9SYPQNqoD8vyTemdGpBoJiglhkUSu

GJD18cvRIt56MY80Vz6/KPR6kikJIbh9QBqU3iR1/D
yF3ZP/rGzhfyZNqfYfXGyMF6/QuYuKghAWiyf2+L5/gAlaPFJjsWxgVXmhc5DpxI8yJ4Ueabt6y7allS

FZpBWiaC/lWGuaxMZMHqyLMZQFfLno6CIcUkKnEJ3Pze70/GolNbnxPyb2TjkMbCzEOCA5jkkhGeVbE6

yNTtZtss0U+On5JBR5KdLfHc2UMPvKlFmLPIimhtfO
jmca+bnu7XZkExBUPas3x2gpm6OUbvGvO8X/BU2KvEAmPSI/6eGUAs11ms8xUb0EdxxaIWSugoGbe6TM

MI+9KZ+6fUigVFOARcv5eC4lOq1VsAPF/kh+X0Z+Av/ujJTkMUss28/t6uGBfFlNC7jqse3jKmnpyZmh

/cD21o7dzc+n3/8r+u3WxTwY8mh3lDnDh55YwSppKz
Y/QHP4DsNRfJuDraM63sL+ikZ5Dg6NIHz4o5wLUrbx0KJmEBMnpkz8tpNAkWTGqrzoI/vvLGiSAlhJZW

73YcmopNZ5HkAVKlRiTwPsIXWS6/eznFJmD7c2NvBHPtTbxBiw7uytid8Tlu1eBRb9e417pTrn36BQUi

7pjDJseCbwb7FsJmW+Vb2wh4BMOAYiGbyDsi+4gQ4N
sEqqjmYHdJUd/SSf1FnGDm0V1ncxVCHyVX+FSb5gs8nVMVC2AHbxlsyqyf3HbYTY1fsb2zJLo85b8wZo

rd3NGgi9sntZhf62bRCslMui7qclE3o+mR1t/h2tsNy4kx9w0LTyQwSuNsVuRC0UG3pkRUsTJFEkn0Ns

ywHbSYWruk6d+geARyowY5LJZIuoc8WxpxeC6NE2cm
5cV1Ke9kyYDmZPfDqgybA4obvECPT7QB6Jhm0buRgxOVScpcC6ONp79XhZ1edIZ8GGwYKSXMkSStKJLa

DB3khOw04th2A0P+/qWnvD5PIycKdRokE12/qoLWfg8llX4sN6ZxEawvXYDUfyGK/i3nbSt+i7XgwxNI

vzjBFPwcECTIfnrqM72CEn+AQ73DlrkdYhq6EXB6b+
XQIjs7kd/lyPiQl0U9BROmt+wDpM4je1KuliNalmW7iA6NqRK6F3Qcf16iErG0jItW1hOIthbYZpQAI1

vlPylOUzlREd2uS1vI29vYVpFtr3WyHwv1TZwC0YV5VsNdrrBwc0lsFjw2RFBTlcDVUlmempMGRXYM5d

5PDXQPPTHO/2pO3ymGqORpqf5SA5NIpy+M0Z/JsDdH
amPUbS3x6MJO1HIHHKpH3hnM+tcNbpZd+dp/W+tIkJI6oxM8+T5I3grw9LO1ZbBgw1UtQHkkZ5Um3yOJ

9oLuFNYbt0vFh/Zp5hkWjg08UrELbce94ccNeIzFCWJsHsIctHe5wpebkZs0UIpTePiKpjGQhk+m5LCy

0t9YzjQVzwL13nqJGzpCkJvfNfCoA8V8IwTYufa+th
kN0lgOj0yD4UgydC5ykCpeNbYzEunodv87zunz0ZDvJiD0qi12Lf3/sd98zTdRJmsko/Kprtwoyx+22r

o+b697vneyMUjVqdwY+lPc5IIcJe0+H3on/Rnpff9ybHSra83CbQql7ui3AqhRrG1OmFZC42IPgpfYPy

8JNwCTN4gwKHoaQrPE/NKxcLJGcvP+d0wrii9Xeta6
Wr+B5jvNBNOMyOgEcqVtg2/cAthX5yT1EwNO3s59EmT+FqP37/+8xKpUcds4xkr0maYRQFT9q7UgY1G8

pl/b7AB5wZTAkZUIKvsP61ku5pXhahcrdHwCsLlaewguZmrfy0C2+F3euCAXOLEPBMJrYmgakix4F8Zr

H7bv5VEuPm6Sl3bSzbqNo3CGBUdlHWH5xImlGWhKV0
MaAk3xISNflHL/KpMQyixPfEPqbIMA8+cqFaO12+QNYVHDycTJwEPamIW3nKi3hs3NqKSsrc1XkAgDa7

Tb/MvaxjUP3N/gk8hfWFkstbV+XviVJ8mgGQw6uxioUhrkOwLK1V0a5SHiQ4Y6Q0PhHccZ3D4D8LcfhN

aTkimNyz1mc4joIjrKw1oubRvVxA3ckOYwu8mCALKY
ZMUqxIRakI/cPajMWBF8W1wmwGxBncB6O7eFmtj0Pt72anh20dFjIWX63Hti0XXLiROFt+Y+t+ibSSGm

F8ZKlCo1CCGgHgIko6VWP4j+sfJjE0uJVgNxu1UbAISC0hv2tAXnXkCAPIbeKoo/Zy3fnjwt1vzRHRQm

lnp2qOprHTWzPw0nZKfQ9l9ixgMk7oGEv8fyJAPOnC
QviE0AvqVrl4VJLbLFPvZNyZtXJG6lcsaEgb9OOXfw7FK++EHs8/U9rAxGCl3PXYvA7REqX7MZhRXpfU

GSlsBUL7YWhMg5O+At6GIdwPYePmST8uv1o5JT5YuY5BS3wuBtxWEbkzB+NIHLwCYwxOQKPdR3QYfYby

abSbCyU8lkq/gFzK8LOdFX5t9gnNh0bk9vq+FWRGXg
Rp0u81NaDGHqi7EfCHNqyLcSiifHi5K0Z4fPzkSQkkFOMfYRO/2erWkCKjYeYCZ4YfNeRkrS+PelJT63

N2mSPK5lTlmZ1TMYL9dP5eNF7BeNi52QtVQEjp5T9qip4YemduwJEGjkKee4bIG7g3W5q4MpzaWYhbRk

tSljb0Dx4b+8+N+Nqhvi/HGCQUIiED9I3ooI8zFVef
hZY5CLpFSlZn9SQeww9u9SD2aeG3mIpogVkMmJTaBnn+ULUEYpT3Mpv6V6OPRD0zwZnWzla3iPl5hA2D

tcUXFjoHffHUFk7CaooyPjaGfiMz+vX7hnacW1Nl2FSHqPKT+QWGiII35Uwk/mWf11jdaH1yOQkFEJhk

CeToQ74HhURQxt60XwcCBXZXe30pYNsvuA/mXjryLa
4egC/c7tyj8X7KPnEZz42j3RFaFZC+ZXSQ+gMRoHMxdrlzTeDgp8cODC0ddA4a3VHn1ioHTuJPV30tCU

7nPM6Tq3NDsXn1cIbTucu6cApY2OT16aoUKejNeFJkX2veUbQiMeKcfpdy0+E+g7BKeCXxifhBI6zi0J

w3E91DICUdpZkfv4P5g6KpKVfvId71VNOcjtJgki3g
U4LvBJ+tCQtUDSAFHAif3AuVJL6iP6SGNdfQnYp3rdYAUdJyz/sEw0/ELIZABETH/BYSRphWio7Vstg7huMCY/

Rd9fTDMW1c1AajKlUB23F6iLYMj7EOcSN6/jlofQTJPib5eSl7LmBqwAH6V0I9o9yYqUK9nKbLRkWMk0

SxaCfJQkwwevWgIvptFXmGp2VqJlRglf2XhjEWbSDE
/adb+qjB0LRujqe7GCOadz+VzYqEwKysvlm+9s1O5IZzbVIpjiKDakosUWibgAP6ZBmia2CLOkPPiIvu

yV/XlYvUD0hCcr3ZS126/dZbDeXq8yMq1Oz3jTne6CeqbBZ1mFfnaF8U5PrYT6dEzPvdthNb3c2r6etJ

ErLdmVEEh2jeed+KFvEcZ+55MIRY8bevDH/MTg/Jyi
q4KcJPLo9qSFhvQk5k/RLYGjClMpHuVzusyfYfjL7Fobv69ZBYpwggGTA7YrWKEqfLn/5w0l1ilRFSau

Y9thauGucMP4O4S3we9t6xMY2PT37ZXF9AhpzUK5Owb5UAtB/E8swBn91W0HStckwEXE/MXWwJ1x7rK3

5wv3x5cV2vWXom9kES7Fe7RFCPZ3IIiuaqqaE9orwL
8osj+dpi4CV5+RegnC85qtgXCMrRfALnLrrG2RtUAQNYlh8rfyWouqG4e5cO+0SHb6pMtL6mXk36Du9q

r+kE09IjRvCIoqdeiWiWn6RuKGhGpK2Nm3dItJHfej+xXNciJXsX3x5nZBGZ5MzIyyVeGW3+rz37gGKl

JykhY1dfVMgRjUpDM4sp7wxFwAVaTmmtcAfrzHv3GT
7nbuL9/jgfFTaxvrjpO5/S0q+vCilOL7AD3defHh0iWcUkVRbL/QWb6pyoof0jd+7ZJPPrje1RiFNSVg

xY8Bv3elsjebiHxSJyrfK8ciu3py1JztqtnT2VBz+D+uqOQu5KTSRXjAkTTFko0J1P4WtkxyghjcF0Yd

qS7oPlfB06IXWP7uMNj9bojC+S+2XWxG3ekXeUJ1jh
zjoDNf2D7TMtBuPNyvWzaCxe8XNPLvla7M6uNkjEFHIG7GUURxtKHC9gQhgcbajdSwW+TxTt4/hVmgcn

stbEhBdcbz6zP2Tan6iBa+pDJ2NwjN17JfGxIFxFpLaOHoukXoNf7oeaHZcvkT2hzA8jg+tqQ4rM7JfU

NgtHE56pq3A+0iqeuZbq/ERZIj+T1XySf+pF8ecc3T
bXihP8Wrxz/LQj9UJ02GNkuwKCWao+oxTXcYT7HIyOHwSZRgoNZPbkrKCNUiV18AzY7VBJf7so2JpyDn

pqs4xnuJ62WStPQyMCBz0UfZf8rZ+fXkiMDiL2pM1VzLr5yjXkIGWSfpryhaMG6DfIAcXsD0JR7l7ktE

1EyecnmaljWo4r2sF99b3/Z+Qb/DfSHMZHlu8FF/Fi
3XFmBPOAPKJjxVz2ghAAwXUmnbpDO/fDtXp/Rxv0U+Ih4/oDL8ypyZkhO0BL8eNX9cdqgpffsmpK4i19

vz9+dhSrszLy4tTYTorqnXDJFaVdAfVGIKMpSD14/54KiabX2pmBkcgbiwxWH7JnH1E2kWh05OB95TIT

L5FztGTZ1EQjyJf0dCZod1yavyo1nvLDhGwyNq4oJ2
Hk9dQB0NQvYiqc019LVMQdxdhRvA7axmqjWItNxFDFGwFQbHdb0WNfeZefkXMSQBoXeFBKR+MeTlaPKC

cXtiBlC//TwgQiPtGqLAJIK8LSG8SD1AZPPiABMNUb00dvok0N7Umn2TbriLCuZoMNYwVdjETEpR6fVI

e7PIXkVLcXbtWupmVBoApl0YPDCjBa4eb9FmhAAETC
efNmYyWbX6+wxeIorJTggv/gZ7mqkCZPdv6K2NluMv7aGA5D9X+3HR0U0geYtpsSvvzacbouhnch3G5O

1dq/RfOfPHZZDs9Kizq6B700FlcUCQEmD0DqLn0JavWHLhQ/EiaYhEtQhgiyMYdVUV3I5x0l5s1Tv5cW

/jvT4KJkB/n0/OZ0uZFetijTHtQFGhgSe0rc28bF0K
FpZ6j4AiuOygl82ffoLNchi7fa50XK4rLcY9P+AHQCVySEdThcOPTGfjW9zfq4oajIv8X64+7Qd5qOwu

h0/rwZP9abt+mi3Mp9V7xeFMczyZR7TrIhX+2KORxX6LDWHNdbaRQ1hlZJsap+OYAR5IHv8fFs1uy1A0

8Sz+KiY5SKPoZP292KjgOMyLBrIIaLJU4hj/hI+3pN
PqwymBTXKnsybnF0InGJysayQlGntpBiROaKwp0oJQhWXzhbhTy2jps0fZKoaRCRzCRvY5I4vUyAA3XA

CL85ZnlpDUS8Nw9Z8XwBMvNRVaDo75ztIsLn85OfqBjlKD7sMdpIMpjwbizXEAzwj1oBl8n0wEhDXNfd

em0bwOWc6jsJrehMnh75pqEETa8T7lFUoOObxh/026
/VmM4woEEn36n8F61Q6Amqd7ZRyVp2q06oFVlvgGR4vbGimzqQIBFjhjhQXdMGTvRwmkcZTKhT+icY4H

47IqpLz2Loiup23vWPeVsz/47EAB1ftvTY24xrt3gsDNeqACzrJWuKSQ8QsASjlGHppXZBzn/PB22Zyu

2/ZR0nrebTz5+34x3XXgJdiMfHpzNRiG82S8HLSrwF
eGZy/s7m2gitA6GMvJqq9IHn3j3z3xoD2wS1vKv14gEpj/qFHIBGgta1Yg8GX8z7bOcJqKeMctWqBcYz

CTIXAd8HO5Kp/MAUTRERSpsZuIUX0Ma9idyrVbh7wCgkOfMa7tgknlMW32nCpcWCQ/pvm8AqPd1YemHz

EXJ7IGFsxtnm0volTmkUQyghq3I9Xk+b7ofgpW41yb
j0oO1EVP7j59vR2Yp7pZVp1QmaeP7QHRJpQXys05FRq6l4D7en7zDNeWYxkiqc94Q4oDSRmVtBYTldTC

iz2kR6MNBhpeixNStUQZ+bw9Bn9zSGa0luLKn7Gt9BvkOZp1b2DgL5BGl8uK5aZE/i4oH+f113rvo3HF

aUrsj6kEfMfAF29iJcVMUOegxmpdCWen11NoBVkeOP
svst/Bg+ihvgra4N+Qst5J3tPKP7aVg9stENMypY5K3N/0tDLMyQ3A7kBZtgOkGi++qt5ei+mCsncGGh

l6a4oQLdKVh7GHA1T49YFa6MfBHaSqCNGEONrUKXJroFjc3BSrCKW1wtw75CTusOAGycEc2OeMz1Cvhh

7+iZ3PzQRw+OWJdihoTrFKgn1cURFOTABBOy/BLGji
FI80XF6lbabCzf4k660eFqsDy1LA587VORQWEU7zUpUlbd+ePbKwvLgR+5asC8yzd7ZTK2rY20SqiWNU

gde4JfrA7w5yc7YzuZFTbOraPBR2aNH4tD/v7eSG2Y1IIuShG1OTJnxTfMUjypTVBRSH/cZz9kvCyFdn

cvV1+T5ixdgWUBOVFwP2azts9DHBIkR6jjVjMKVK9c
zraX59hd74LAU0CsfmvR6dR6ALBNY+wATu52jotrCfpzGhxBjbEaReJfzWNSRasp7ySse0leOdQTWhHE

uGPZVjT3uD0KTaWOMiBlBZ3bP/wbK0JnvQCFa7Edxl4lrrLLMY59xH9F8EjfLcHT0T1JyVIAribyRkk/

pyifihKydd5AhPBCSgi+pdId2hs2ONbBYLmP3WeO3G
/454mHXXTXk+0lOBRIO1fhatFmfmedsK2pmPgK78ckQOsGRaI6vOZX20ybGEmptvjng27so7LFNeiBNh

nw4f4nYS7NvuRXZ6xePq4DYXKH4tojGDYf/1JO76fOEyjW2exHcCbNJ5dfHAXyZZJ0hnBStBZ1g7SjkX

15iE4d4fLD5CPk4zKxMd8o5rj/iHasg8eOG9IqQhcM
pHi/NsD1a7gG7oIn6MkvRk/MprYQT8+LaP3IwtjS8xua78Xzms7qVrFouzO3zPD+RyFfP7SwDPyt7FP5

Ib8cf0aS9Q+y9Br/4voD1DV59ran4gt764BW+9pdVvVYkkpRMu5FiOswou/Hd2FCYfr+q6u6mAsVy8eV

CKQQjHHGJzPQx8tv9pXtBjdLF+Qj3+nMgyefzz/oyJ
p9B0VpTUS6t1CGTVXoi5MeHESvCozDAIQEBz7NXVD+mQYckuUQSd5WHRlljp5o/q8ukeXzKI6lKUlC/c

aEj2e8CqTUEHlpbfFBxHvceb14rFMKCc++Z+GDKgwKBYqaL9pxq2+LN4gfcPI0IWyP+DLUPAmTq2d7Oi

SxRq75OAevxclz1tpP6wzdoOvHKkQN25svYfGZ6Eoy
+cu2nihlCCf7U3lBmqwK2Q7+MmdrKj3BLiXyZzSDyKvqrWPQX8qmNTTsUp+Eenyp0a84coV+G8AF8qQY

gDKZFSSS1GvMnVjtskSKIDdFmdi6901SWp6vZ8/BhBwFbhMKk5iKzgA8kXxeE+opx69PzHQuxvQd9Trp

v11pXZS1ULZA97EUB52Q5C2unfPR6ltlOBLYmFJrnP
wFa8vyt6bFMBx5wfUX8T8giod6nYiLfoOgp14IgQKMGUJErfn610ZGD8p8xqAzssDSxr4MEA9BmI6gIb

/q4RsSJRozqrzj+Jw3EP2gxOSIwz+YeqBRYnjyc2GIPPhpoPOQEI76m/cBtT5Pbm6w0jmuE3Oov9t1s2

h7ESHOvPHYCIMFSmezA1NZsCn8SySRHt3ruFMqn2UM
0MA24WXv9Ia9I/9P7M//cd5752p5GVAEiifu0yVotzxmA72UcpU+6cWIRnxGh9w+B+4CAjL3PTeATfkz

9pqde9+McI7JaGXwCESmra/5gdJ0WcWAtbffSAD87Sh05AHWVIEmp/zBaBSVCCBqbgKjJRFRdB0yGilX

Q31ZmCXMQEcMaxGHvkdL1F+xhiPshK5IWSlVLxWI2n
BiJOImrCFC5+SR4AYjXAycnlBwRPyc9ALVms0Kh5ihe7AkEDHAHhonnut8zFdcnzWG93fz7qxrMh33WF

eG5x7G47gx/ROwFo2usogArf5nwJxz/Vn1a+wXrYGNqwZUGdxQWOEIWUmvdQSvujZvO7mck9LdN4Wjm6

+zfD89XAsZCmy5o7FdPuRRDU+95sQkMhW39cfdGYD6
cBwIbM/oH1sBq+pXUqxYLlDER4twyUqQzZpsL52ejzOpoCrQ9DzudWIPtHU9deSQcfSyZOOtXcfQr4Rb

UFFO7Vv71bkmZsbo+Rw5SDp1iVvRPIS/3fVnC8oJUPZRwYJynvFhMNvYzKKR7fl+MzG0Sz7hYxf958xL

RB8ZoZs3pP5VdJMGiDsObeEYpkjbT92mNKYJ2qW2jx
0oaQ6uM5BXW3ubCQe+HIsgAXUyW0fDSoSpQqQxRfGiFWhbYgxLFd9OnyxLK4vJczhtbxAWRdOpWF2OmF

zTi3DBLhHZtGZbtJsKSkKtOqEwzA4rdgn4jjx/yXVU/J4C3lcSN2eksF6Jdto29Gs38Y6TTPQfYYfQMp

/ffmKWwHedlWsGlq08jGq5YBZJn23iJdrPEdgRoIsJ
1S6mgOLyPXoTZehZfoThlCL/zF/OVtsnHh2NFwP36KGciwzoiYGRdZy8qSJhHSx/EF/FH8cx/sxU/1LK

02mcx7Y/QfEQq5e4TCYHjWm+ahkbLSl/pPBVGHhzyV/3b+/sphVrh3aStp1LJeTSXBszZC2VlIEVn4Bd

Z/7d732I6+oVzgkuMY6Se4QpaqBaenAb0A+xhoR2wA
ZJp56x27eAvTMcGWfl9BG80z0oLOagkkVgh7D/E3awaK1E+ty6d8G9pQObWUrgBevhKCOnc3wCUlRCxi

RAvUZbhXv9O7khVWKfg076BbsV+Lks7e4FEO4Av1ZTuCyLBaTT3FYLo7lJfUNlhhr+qM7+HHev1tWuKi

HB6w1i00Qqx2/Dg1rl6nA6tOtPwSNG/4bSXRrRX2Hl
xbsTQ92fLdJFXJf1uzLgsUya0a7TrxZsuVq1+bTNKVwLg9lzJOy0zn2v9eUwWxj9T41n/jJCDV/pB7mM

YjXDymyK73dpt4022muVd9/V7hTcIGKU1pF96SGLPCSKIysdD8oa1F05lfn2/mGZJXZbfv1aDJE+RfQo

k/+Dv/UEwlxlrqk6WhfO7kM0RQ+afgO2LL9B5ubWTv
GhcH4f/HrRcjSUbZUsE8Z3mf0AFjMGwB+P78nkMpVPPDPnureRkhaeXE69Gy6CV9XTX4rLZlstfPv06x

3wPzHwCSg+sBayO884BhKyTqcTpeLVPMa/qphMqSbJP4RRlSVOXDDWxfxpxIN0vGudZZd55QX/ncvOqG

xX4Cgqw/5c7kt6baWfGMbbzwbUH+pcGfTWs37YGmMV
Z53EMxzCG8vkJ/Ry66d1IdYftXPEJEAVk0YNBX2CgmzyBOX+kVJe+wdEOrGIDPz5PsAgsX/IGVybdYzb

zNabJzitO43SbwSd5DZQCyb36BekNS4JRychV0aS89gbhNNkpvb2C0CAuDZvmH20RuoBhQAkGuycDIek

jiPZOV6BoLAi9kuXAVJj6PC7CIj3++t44gQyAs0/ta
HK87ifgIBLccKb57EpbW2ze9f3llJBYoAjaHKSRYCGSHpUVKSyRXpFd5L39Rsyw3eCsvN3AXIadTbMm4

bVnAdKo9WW/SfWZArfI5Mj4b4+mvNRvFhLENSeVJykq+MGhfochcdvplcltZ3jMhICIg+5oVpTtQ0hJA

AiKSHzk+gy7aGrQ/o6ew4uAHSBs00wr3gHBKY0BKY8
l/tNezD0R4GEy6neZElOQkKU15HziGrSODTsJJTj3vFL+QDIFHZuC54uD4eq7sdz2JRCo87gmtdC7y7I

lYK9I07b/HsAYzQZC2aIYsqnKogVsI8Cmili+mHJrecGxJGXepK2m0WOCmHfZ5K2ZDqBUYB0YRZdfYle

4kUzHdYf2W6rymk7Z7v0JRTptThUWXtHmF/Nof7C4f
LvxcDtSTpXX7r42tw7zhgHg6bJtXUvBJYiHQI194FgJkXLDZYqnzLeRq/NG6B8lcAyQ1id/ML5AWLXh3

5eBLDF5B7AQnaQfXgHBgAfIIZrwVW4p7jyYI6KJ1YbwPYoxR8JcdxRdo+8AyYO+54u++Mg8jpL2xvosT

CsiGBTkOF13DIPi9G8uJr3Eb/Pb4mB7/Qp4BmVi9xh
XVVSBnsLcHjVNT6KbZp3Zja7nIuUu+0eQm1aOCcZXTCTYXbzs0+so3//rYghIvLT/ImhqAaeN2j5APoO

YlQr1JJWQqTkoEAsvrTeqOQZSvcMYVOSfz30Bi0b4bdtXYWegomhDMbIQ0ixwea7RbAmnI5OmUqmVrmG

cyv7Vr4remOPbSWdSp6B96HAY5o6iM+7l0zpWRGNrC
LO7HfN/yI8sDY2frI7MmFwhoA88FGXRB4mer24XNaDg9jx4AZKLkk73qc67PSMXGoBSRTCkpwofCPm5u

dKBdcxUzig+wD0LAlhToAnVbyoE3EnKo2fySuC72WjobLteqzMuu9IUZ+4m6AdOSRTy5k6OKmlGG+soc

EaEO0Agp5bgCbZ/RwUTPkmANwmw2WA0Ni4S4J7lRef
hsBGWaYhuIqyku0jkUpLFDTTpPE1zkkJcihTwyQQ9pf/345xJprnb6zmidKlq61M3GneenFI4dWuVfhG

2DpQtmYfKUeTU2k2vbqoSOLs69lj9pSgQ7YYTTLpweS2Q6CPAu4/giyzxtSLU1DoF1fe9U3a0kzjS68y

WeerDgK6JPiTU+zH6GfnvqfFCO3ul/43t7Z01jDhtD
2I/u8MqlLqkQcx5KRS0qFfwCrWiUa6KBnbdQBP3T9ZofjLyw2+lzA4YR9eiGsmb3Z/34MN3gialeUDqw

qL9JaACfTFMuZRr3+hH3Vc3nPU7nLqUMkjfr5Dafa9/xt+K2d/FL/wx3Jfe1Lg+RGjQw7rooZFXJckB/

6J/RyI0b6GDxtB/O2gaqQtsk8al9Yccrutqk2n5rmJ
23nrcOquUYYjy1gyrI70zrUWdmun7fgl/sy+OLcYWBmm9v54NbntKVNVP1awurOZD8Wzq7oOYunFX760

5B7PP/FMu4EAuBlu0A0m/VmKxPuFklANuqpfCWj/9g3BILzBU+i4rPVRaJ1tkxoYzrbkIuoEHieHLCr9

qEKKXmODhtwBWX6vFfIA33oMSYCp2mcgG9fi+hdy1y
pRSTsgwmyPvgEBjoCADMvC1p16GoKpCdg45OYO99xvt4ZX1PY1tYPipjYdICvCkl4vln9CmW0nNDOAaz

zFY0/X58hP9uz/te4hvxoXVjnVgTWdfgUE/kcDI2wBBmACQMmMelczlWYSxQCz3CCm74xgp0/eekj1u+

Y2ByED1T3KiKebffZttq8n5hD6i2jFLdS+8GaDHjKQ
3g3Yl4DUfkXIymtU/ZlkeZpHxKaF0ol/6T1PoutP83BCgIMqOzDmc4yei5dW6L84o06GdVpQmU2EpLiy

d+t380SeAlXB8ZlQaAJCBrN5cLholm6lB8cMNrimOobFMjy7s7ONb3ivSQVmUMbfuoEsOB1zpIXdhXzQ

Pvr2/PcBKFzm4yZiXMufQcwFdeeq1WL3Ge/a1WPf+R
G8gg64TfvqYLoxOErExqahgD795cQhiusHFz7dz/s11PKtSPlHNncav6fet2/eadWhssn8yML5g5eFit

CW4ZZ8kAwxLTVQ9VDYuMCtcvO6VJTrXnUzX2nvjqT/8Tm1MIQApDZDA/B0yGrZb+O4Ql61wZK3oj1IjL

ESB3/DCSyqvccvBk2THdvBd3Ey79/hnT8BpRcYtYu/
OTQE2Adl4Edj4Mpbk9bku1X1AkIaCCxzASKbN5Kp99L/9uQdpRPbSm5h32b5/RP2zrXTiW+I471nquBX

nGaWeoibwyAWtF8Gn5XGkreUPmkLkPj0HLzsopJLc+9D5Y8+UEV5wMywAGRJK1opMWZhbqJIDw9iBxLY

+1R2FZ2rkuSMXC9CD1J86vWVYkjdbryd+XJLnNtoGC
oOtvZq8K74z0ljFQ6slNYwMB5xT3YEHf/fxoQNN9rfMYy7yQevQ3vTjLlG32yOLh7vZbE8Bq6OjcAWX+

zOkFDj3jtZw8MZTyBpGQDEz9T5E40ZKCe8n7cfP62evXhsYigVNlflO6CUCYd98XpkUPOlcjyACT84kG

aRFJAUdzzQhmGTQk/uSI4JG/qcXBjESKLNMMRIgWh+
4hyimXHa4ndZ6V+T/IiqNi7KNeuwIono+ACz1ZGGthST5DMGZcsuX/+aFRVtNwQqh8e8c+GBU6qWBMuB

JPpQWvCILH74X4vquP8cJusG6prHlQNWIgGiQM3dGR3QaByesaV7dsZg3FTDAdiMXLNaplIb840wwbbX

gsiWqDj6s2+616LDuoJpV++2npkSBvhFzKBewzXEDC
F8mpCrj45bzeG96Y2/LDThI18U+TUyDeMGPOcR4GyrqMAXOZ2XEMorfKeAEka+OMxO5fdtNpUjhmBkdB

BVibyfo+8UlMsWkRtGendYlB+kP6e5x3NboWRkCC9uaWjvroAUILqqbIDuMBixXCVNE/VecvNud6PZ3+

D7jz08IzUkS4oIUdhSpJM1pqCcxSSbDFUWEbhc9VCH
97t+4JDAxS+pD+6JtvX4i9PGS0gXLTMruo/7sQ8VxFWA7pjV/kRngqVD67GwBM+6dhvyf126awwBmHVT

gXNR4mP8JDIhCwLd+LJ0GIa7vMIbgGFnaZxofMXZ0FyIWYC3EKpo4e/rUttR+U5FtFjSo9jeFjknhPa3

tdOB2pzsfYeTXjOA2MOdbSjfuR5a9wGbFXQHLVJMkT
H5JyXoFljUt2LtHsnvl07L1XFXxjIQHBWM0rslmt0STcu7sRCtGzEnuUKKQiv1V0jhLwyWW96MorRBvI

9+6PwOSPDE8SHASDecYUM8vETCLfCKFe7qqcYajfG+qJ5uPPUMIwGlbBHw4fsYxVrhD+02SP0dFu+sme

J1b4V0N/sbrey7RlRF6NEVmvDka6wf/QdWBIRessa9
QGZqZbgxSF6a4p6OupEqe9jHqLD21IBRWo3W0+rNzTN7RbzJ/65x0inJ/coruza0T3iLH1USbhBGpVr9

oiJW8mXu2PH6quVsFberYpCRZYZAe0gzYmtVT820Y8J70tvwlX+8HwID6Dhvb7MQIT/RVz6UQl/A2Z3V

Hn3zGV/6jvAxjwU3305KIZgxIaVvnWEYT729C/pNVC
xetSwEE6wYV5t5MVCofnSpI1I7CcyN7ruANlCF66Y5e6JXeJk8lvLLuMontdFfgy3BgWwgj6qBD7gVO3

cLW1C1+WWnVt/KdAZMYUGDw05Uryp7B5UpQNNjGCuy9+DjxxwUhC1BFFCvnL91VcoRUWrVfUMtY6z2q4

VvQT0Pm7zkqog4reC5wbj1V2Dt9Z5h9uSMEWn7+uB9
TbWYrk+SFyhrEes0uPMa4GuMtMi8Jdbaj2J4vHq7anW3dfXd7TTTaLZwFGyvt0j/voiTDxLcinnFj9lM

RyWr340nFOt2gnGNsQAbCllZjaaSlfc1ahY5Vh20SKRGYFtalpZ07Xl3WlKEOw1HLp9MYmsEZpkV0/11

oKo34+/+9g4jVTI0yNKHF+uLVi3Zo5dClyzutnBtUC
XVojwvs4fEHkgnI1FcHY1KrdR3NtkN+Aw553DgwcwC6346tv6upj60uN3jkKsO29+iXAxtFXsAZIESq3

xEBjIFtiwEYk2ZclHAze6he4V+s7lFgTpQD5Q1mT7PinW/jv7F4+jxx6qAyo1XGI4y/oY0YWFbj1vPO+

rfPhVSAVSEdllwbBdm4FrmbJW0jCFd9tW6E78khmPy
CUk22SllhJUlycbpH0+YypCZjMQ3JtIKgnTmnbjnt5ESnIGUUPJY5OlDpXO8sa/S8nJ2doXlsqCrcXBh

OULM5SLWTqEifb8KSU0xOm2yIEXXT0LjeTteYh4g+/wdzhT56O3G3h0i1uPLlYK38tgd5WhjS8bXrKsP

bo5ik7cuThZmslAI0B5ZyGv7FaWkJVt6nYtjm1GvUs
TUBUJdPO2NtDir5C7ch87hMNiutQFxCIVa8/A5ubt+TK7VctI29BXQbkvtgkc2GGsLySmEgm5hfRwcdz

37VJGo8phcwSmCZTwGVYqMFpz56SG2d2FFp2FVvHFBc1vLiDk7tqoUCGkyHQuJb0nJkVVwgN/DBRkuNU

jwGJKEKt833Aybe5BMnjaKo6D+9b4VHdWnNJuXuQsK
wMfTe2soo2WjN0RH7o8wVwxTGGTkCpwbFDIlUupghwlJtrZoUtR/lrZGs+VkkMwnp3O7kW4YOCPxMlJ0

40qLFUUJdSU6ByVSj7Fk4RKBnSHlvySsIdscE1Dfiz/A/T2uVhoesZZshEpjM3hXqYVjeRdLGFG1Gwdn

jWIBICn/6dj/hO06NzZMcpKyUWhKuabpxGm6wCy5lh
xBdi8ESwiUfpLtwIJcRArF6hjVZD17tlK2Yg1JENzRqhDsafsa+Urox+y2JYKn4jK9l6Smq4RCtALAq4

+lrbDt+4sbUsGppHyCenOSZrLDiQgNY+q9UGtOC2vrz8A8X3hkUThTxajPpH4N1t9sWGyOzybJiSAXpL

C0RXq7imz6JltxwQvWNUybMCoLs7eu37UtqgJlbhh4
96bYwuph7+/ODw7Neyo0qTjpbCa+5iWeW/jaU7h3JvlrgDlznDwfvz4Xq5tDYj7YgLrMJewSnr7VgtWt

1QgavT18ElkRpdfxjob/HJ7o8GcWffW/sxVwY81qGYMSFeY/9VrqUMdb50h63jEBwgvJZsn5zgfx/+Oq

t+hxgQp0Y/tk8gcl9Csdg8mswIKaLNed3yv3dXeGg+
HWIHLXdVhKYBoM5xdS/J3KhM3/OLYucbvIb9mo1SFMK9r0ub8y2K9gKskPWrS8d5nyXL7NQoRCOvOksh

Yhsmvuq00pKfdIZoqg88x2MfmHoK2+kIZzRD/Jl+k35kerS1njEDRlDDT4MC1SWWeP3zjsXVfb9LrvV7

0BoGvbyEjClRe1gEiE8zb9IPp2AruSY3VTzEz8r37T
StZpdodYMnoZ+YiOA+6qjpyDzvueem4KMtbBLOM1CS6oGYovH4oOIrCFPXqiAi7aMDzLKkZl3y55F2Cw

BcmtVQ4Qa+nrHsHT/fkx1aA0pd/9lq+//lTxWWgrIK1N19umqooNyYzouYDktOvODo7q2+R21UZ+nXAA

tDYlmrsHoDcul8JHDIfs33Tkt9nGBYBZBoWmB71+09
8oiy/N+97EkI9HpwDgyO4bkXPVLSW+BeCR7noDzemhSKySMgFImOeBi9bGX0RgFHcjlbxyDDQApcl2yd

izLz2itXXe80vbGD2KW5eYo3u/OF25ftBy6NbXArNpKX3aSveuNB2YqXj4HmSHgpJdZB7kgtDXbB3nkT

aXaSOlhzjaLwiFw2mKAOFI/1Zr9vmax0Vufbni9t0i
bEtQV0OLKh/6c9FKtLXVZ3pFBWdMd3rMLMeteOg3lR4UHXUg7bfMcoTWIYK2rhvVNyJRF4BLUQdPvb3t

yIipJtil2Gk16wMGEMw6yix2tLdcH3ewEc3wdG3fk48xkHN3gneMttUGpC/4hkTgQwc++ngDaqbkNhlm

zVKrSMEKX9PoMtskuz//nrpzu+p7SIgLM1OaGDvWc/
MLdjqSaeAE3WyhtfoaiaEk8UNw8+7Q/y59lYFn7ERSL1EB22i0F+44WlH/YCzsyRGBr2tue6656fmAiv

f1+B5t1LzrGQlJFw6mONfynZBdWq2rZbheBxfHR7+Erme+flHlzfoOk8Zv5YbLMt3Arhji3tXjhtVh2O

e95POo/eMNYFVuZwzzlsn96T4VhM+Pl0ttGgDPu7WM
K8P66ZU5iShiaNKfeJfFRwribiZWIHneruHjrb7ts6IGukhcjWJJKDAb+/FCTAUSF3UFHWtNRBQXXfZ8

AbFq8SN0soocFtVIzoYfE2u4sElAW0ITXN5NogUcclJGgKoe8Id3lHpede/VT6IWstbr2VTUvCKkHCGM

TWGyGlsxwZxx/Olze42u6im4wdVGNIeGeGAmdOYvjZ
ravC7BO2GQG15tQynVVovq1XFalbNcmWrrS47GF6HdbNHpr6pLsaV/SfjroN3SSmhUD8BhdAdIu/E3o/

mKLp0cMXpbYO47H5knKw+qksmMq6Jo0YZFjYH+35f0LokI0JIqVwSIucfP9ggRy10EJHkqjUnWf8a8ip

IssUAO4i4IjvY68jnIi95BCisJ2FdfXz0+/B3Zk+eD
MCqh6WizJKoYXPXmpmc3ob88AU4mYfvAYuAhhBjZKqmgs8yQAv6hDnd167PHUT5z8OzNH0j+Xt1hCKEd

Dyj9DuaoRNxxEQ6+rT7qcT/V4VSwqMAREEaErSi6nzsoYfgiX4UFcDz02Kz3l8juOBqqvJ1IUFLjg9KM

AGtJ6EOjYC6uklPkqqNqQuao3NI9xPxyCnPTpTUsCg
rTAOhLCaYA2W/T8G7n/llbSRIZNz/iphRXUZat5D17o/YTFPedik376q/9f7Lq+IC3pYdndpFzdO9e3Y

APA9y8CQoFXeEgdkArCjtoqx5hb2wj6lc4pGa6CqJ0A2NYksw+Heq2FRDZ0fiOW60v9dIdowwP3QxQN5

Qam4N+WKe2jAgdPLIwaxIVsu8Lw5OlwJprW99ZV5u9
kFp1pdblpWPcRqAtiOwm7KcA7mg4fFsdnJ7CVoPBXPaIaJZnJrI2l0o5KxZ2BCxH8LgP3x9xrxmkHDXh

mKtszS8vEOn6ymcIC5mZP+FkD7HJSfHv3x7gJ1CLrHwhZ9gQFsH8KEqWbUPqbUeBtRyR0+x8D4RuvGzU

hkdG/hEfglYWm2WclEUVYmqQXOylsH4CSAyOJqRIvj
ZoDZF55rn1EamllJt9voVjvv2hY3Im558u25ZcOm2PI/yNoJivP7d56EIVQBHHMwmnPRO9aWxDpxxPRC

6felWN/d9njCOQLarxg9oUzZ4hFwZvolqxKbw36Aj+5DbQkc8aOyaZz0Tvjgr/+kg+WzPuq71Xo9xusB

7ck15k4rOVVapx6ZzLxfsNiVfVek9NkOdutXE8dsBq
2aC1Qf9z+PxFnc0etxCpuBkuvYdv+tJLA049W5MZMVx5SU2WOimiIk2oUdzkTs/hkPZeIn7AXtwWNU6A

ePzM7HcM8j+pNS765+BHDV6l1B9d4qzKfJhT3x7brubbl9vc7tSHfcUk4w+Rq/RKmvycOhDsJnOq8+Qp

4soEj2WEORGxRzcxFBVadQntR+B8Z7WU/womniyRTE
fMT1bDSUc+Y2RYz4A9+nZ0nEpbpCCMjDfFNzsBrhBPwxvZFvh21oBeP3iPn31ALoScs9A1zBYQNfw/8K

54EqHEPzYS+cEOe/WetuJ8zWfQzGYWs/5aNbR6n0cOGKNBQIkYegIpiVVhVv3zHBSwpCUSyW92eRNSON

kqEsS3MYkRCDfcYePB3E1p+x1U+CVCyYIMlSVMXAPJ
E8ib4TAwJZaCTKpXkka1Ate24CrvwS5dnZtpr/jCIZ1XWiO2/X/KfOas/A6sxH+qHjs/m6tDqVvzAW+Y

kQ/ldb8Qs7i4EmZK9fXliYL6B1jkLOjnSy2a2CsmkBhVuronQrBx9J1lAb2HgPeYcxEBmXLDUyfzglSq

bgpvGg2sVB2lfqDbH1F1rikgZ89Nogl9i27a+qg7d8
UMO6sWM9zYgtgNE/gh9w+ephUunh+d1B4yNZ2ITT1JU0rkc/C5wKhGq1kRyANo3k/iIWFphhUJId9oTz

TeI5NunkpsbbyT8g/0FhOOZNZ/8gUPvEvP/fwFf/Hhwq+zpu54gm/IDZdWbj1wuCghgam3pS9f5Ig/8g

/0H+g/wH+Q/yH+Q/wU6cDU5IZUhhfSgMZxCkOB/HMs
21Ae6SbohmBUpEVTIQH7TeY5OJ3mp5la1vb9mdpreG9cjgoFNDom8Mofy6I+DBQeYowbppoJJF/P72hf

je7TagugykgeTkcO44dAuNd0EABDYQM2M7BzlyDwOgFEImP+2GuzmiXOVYpWSOMY8rS47i3k1TqcHK4P

Zg8V7TDhS6Xmlspwc+oYmwSWyo1w7DF4oTTvI/RSze
DDYZyugweK15Gsg4T2lFZy3hrPehsNLd+Zq++kW+1r2zgpki8yUqmmKVcUgWCd2sk5quE2ZOchEoZ7gH

8v32DlFVyrJUi9CtYoDORIvcAGVrq5XYu7zPDFgbEzBvfy8KTtZtPlfnpP7k0LbSduoXtkMakmC/qhTL

cR+cFoJcABVf4Agg+3ZC7ElbeMBb7EpDxlte6sgUVQ
eMn12r6VMcBrTnLDoaJViAaux17TDH4qUA3YpbhrIYa7rXKRmSm0Pr67hWUjVrjNumD5WzvbGAbcRXOZ

Z9EIFq9fd/pxnFiqjVkmzMnsKMmUCJ6lpELydfap+bpwcgRZ9kJXOprY0+42HX14cbQi/7nU9X3jpy04

G/CnOCYm6Wynzb8+db4CEILcy2nXK38E7quZVNLyP5
kScfgAEz54Ot4JrIvHgZB/bay13EFQeWqB1BJ73bKkesx/h+Cd8LKfeyGWJpXJzdF/Kne8PmzUl8eYde

w/RS26h3s1pyz+5vuJVpGdlGPOibMRyJa3YLQsFZDD/RuekLlPKIrfMQWu8J7/dasCezMM9XGtQi36/Y

XbbR8up8X8japlJl/mSjL1bJmliIyZL0oXvvkmCSva
xeOgzj8/4oLy2OSfL/QkWpUbGwKxXY+ITRzivQ7pdfaH/PP4bQ/vkuB79x0THXpQg8qGYdlJxQGTioze

+mpTfCUwg3Pw0ZIIdvgSFag0G2IAC+DfSf0yzegI2ls0rCm3JkKHrkLV0cP7i3Cdy0O0S51Y3TKz+Gjy

02RjU6fKje4c/uHeAz7Zw5d7PGVVNF4W52Es4xx7KB
KZBgwdjj5YpAXiqMiYi3HXHMsn/Nzd+Na8e134pkpXb0LFOqgGjABb7OP3QpEDCEgaCLPXqPbr70lzLM

BEO7QzptNt/3rvZC0tDnMpaM7kz6/j/jvHiCsCQDJkrNApIUSz8OFsiAXTMMLHyJsKZLQYlEPWcSHcyA

iKJvfSNUKKMOsGRZnxvn3vlfqXryp+0p9y8gs9g0s+
3W9+MPo5npakf10QzIrzpnrlsOaxBLBXoPrAtYmA3K0ey5T7S6s/vCiECGtQjApXLEkT/LsunydhpFLk

9MPgaWOZST/RDaVbo9xtvue2V16RZKn+NDUtwdADh+uol2mmbECvmYAxOBAovINbXw970BMMfyfVDloA

tVJih0ZeSPIaThB5OaQSwx2vI+DLXPtKT2QFrHkPA3
tQLnMGh7cpF8zMut761Gi8hzpkPmGeALQyUQl1OvXa9hfQnKTo86sH8QV995pTcKZ+14if/359f+i/7f

eldpFOvWRaIZ05a1XI+0CRj7Q+hTR/J83bZ2oOjwmSrEkec4EqeDb21dwiI1g0d0c5uwnB+WMW2VylV+

m9G1YuO8dJUY+9POWi+FzGYv5BO3Bahz2MKdgbpFXH
9a2SepxqUA1dGuWTqe9eQB5SJDiONz9h7G9X2Zlf/PTor5veCw59eFDBggoAu3Tn9C8kLGfa4cyiWBe+

zR5X1F64F0rNSS1OP7EvnB2LA8igTHQqdBSB4EbrG7w1JgdNS0QgjLjGuFUJFb9fDblN7pcYurfcHoa/

heF3d5r7Jg0OKMVN44dyt1QTOyf8GTuWpB3yTk5jc7
QWJhS0HJhBuvOVq5nh0OkZqrm1xW7nigUQCz+JgvPoapVj6d+sEdYDPK/7nN2v3arSveVgypUpgasYZA

kXIqgbY7LQcNAOmRT++QLvlhYDPmBeBUfMpHC+XJNIkHQMqkuZfsVLf9WOOz9a7bFCbZKaFTGttc0y+T

kgVbk1RTRllwihny7Q9XFxazZZqJCoraV72R7Fj+P7
KYv8anw8nhTT7rEsBr0lfF9Vmg34yV9vSOumsrdQK/gRe9PN43G+6VvzqI/j/LABmPHi+JGD/Oa/n6SL

2BQfCmWPV5O2O66VWIoqPsvVHIn9PzTLa3eCRJAt6HiDFLAmbfKO5aNnAEYMVD2GxV6/TG9n1MDzOGT9

j/01DFV/9F1Y4A3I3RBwwVGiGKo4+J5pVKNy2DLWsq
fZeuV2V5diybZcoUopc3PTUwo6O81sbPVc9/Qw6YlkkLgRS6pW7oy/P/RHjervfM1Gbat/h5Jl9QunRG

+JASWQ0Tp8Cwr2quSWE39+0DU3kXZpjPWy9DyYfFyz6pf4IZJHzCbBDL3iIidDemzbLldQnKKOZrIEcf

//NKBPPk2qwxDM5PayzeFoVp5deQvxhauGtV9Ilayk
0eSKoNnFfxpM0545+3PPPz89k2h9C8XvDps70zCNd9pWv4IPNItDnUQo5QYAa2/F/pDfsc0bFXOp+3lX

6zLbzTsm6ttxMgfORZhGQHXy+vR97VlLogjFGSPnuPszSdWgWEGntWbA6kCKKDUYPkF17DFEq9qaAVU9

H1/yNwc+WGNECeOz2uD9CEXc5TAm3UlJuGMgjUTY6G
7W372Zc8NTf7vhcjtgrh+dSzldOUMOM//ZB5hgH/DGXQMVsKC1/PctjoCPnuWkF11H6wlfn7VuurFr+D

DvG+RsD+u1vnkn4o/t8Uxou97/x/LucXj8U+F+x/wf4X7H/B/v8U9n/pKPt/btf7CpIC7/kFWmGJ4WcY

0TG6bZp5Z6hbVnbgVbOivvF2m/CTxCVsW2dP+MbSTN
Dd8yxhu089Zn5YuPSlQxzYfyN3ymUvpVd5SJfDlrAznatpD2+1jHuUA6ktYF5DU5ByMatDFCIHZ6i7KT

gpfvQBZiR1kINps3zvD5YO2tM/rv1wjCrKbgXuZbm05cxxuc8cUwdC6NNcntYst4TK9xoF4LAvRIjOsT

IYu5FZXAyW4++X027YG2BAOsdTqlhsq9nhdxkVrtpm
eytw54mztfkb4xl6UDNWWbXV5gD3XlBqgU0kpDKg7fi8r3WyygmbEZWCqTwGxUZfGn9RDrYl6EIEiWJe

9kyqy1dmkyl51QliN4aFmax7kNf4seTd3ZIAiI4Fz3B6PuLQEc/lx951JBW4hCHMMg3SzY/hKmfwsy2g

o5JyjI05Un1DBV2QbJ8/G/Rm2PquO1taUm9cd1gVb9
TLuRPeH8heM6lLRhChU8MHqY8C3Zb210JYAhhgVnkhkHr63V6OUs63D5GkTczp4BjJEdlzyJ8HlzXp/R

8bHI1cj9BsVqP58+dZPr0GrrvCP5Tq3GhkEd/hIO72bsMHQPLIlM4dBEA8AqDKXu8r+UMfoSJjaMjBld

bOxTJYNQep62aYrmLeqCCDG2BgIx8ROUhL94WcDIIt
p64QkkEd2cM9gxMZUDXeas9c+ob9I6DOrIOaN+wc489Yr3NeESSt9iO41RM7rHGz8jCV227rTIL4CJnA

Hoc86BzYCXokhb+HNLNzWvFkUtQFAidiqmSnFeFZGCp31ebZguGABhDm4gfAb/7D5qgj5jI/Jnvxb7/q

wvczmQ/KbMqe20sQ0jV+2m/z+cGnA76VM32Su4W29o
tY7ZeO2rLhgfTXG2/2s+0MmTtaPIRSg+k10ml5Hwfgh+03RVQEej7eqHrBCA4G6Pxf0cSRNkaHpINEp0

bbITYZuyc2p2KmI+D+BBk1mLQ8uZ3N9aKhsKp4aIfLJYZxOngMr73yOsYqdT+Bb2C//ycSu7vds8Lxog

SPOBK+ShATvT8FgLsVJ5/8/t99hlwuEjhq51M71hR3
FkvSiZW6EanhzCKUtNEEsJcwNshWvXCNDBDZtMrQqscstMAUSxkuursGND/uGRU3Cebe736tHRnYlN8k

zeVARbujevz3z+LmcjugIAtFinNzVOESMt6C0oS9+ASc4LZou6O/TvUSfJ4aMV59K+Cv6g1RfPwUYP1O

O8+Ltg7OhKJcKeVGfRWtTxfU+1C/cM8V8ofh+bzhic
JA+7P6BKMWdAq97SuMym17OHlqCFWgTBNiM9tSesHe4SkYlZ2XmH2IvqoM/GxpJGF23qgQK9SpZsjnMu

HB0OKwsdtJInXapE888OJOnDJ51IyZxOq+dAUMfLSJG1XblGMye5BsJ8KwIG3G+W3wn2r8Uo3d1Sj9dV

3Q+DlegNr9zrnuYHdCbCjgGyMgfQufPvJrZpZNXacI
UuU0H4VbCNw2O2Kk8RVjAxPAMMs1YM9ul4A5p6Incb/izQ6M2C8wyYXfvgVtglXPlYZagGDsAd3hEzTV

YWKc8jpbzQ9frdbuFkA83d3OjuOLf5FfKO3LE6Ifq51AqqjdnTDBkbfZDgr6OsQTUOCxgXixzneonI5m

6j7jKGsvepl2A+l+G30rFxzGiyd2pVXGTtsnN5kGMi
s6TOelbAtkB/q7VxcjOX80LBQJ8G47NUvamCyuyKf5bdaF1mDOb/nFlKxw2uM0Sl9SBE3oF/mTIc+qVo

HyD75i/pNOqiNyAr9wxhzkayU04lPev+ZtIURIq/VHOhncRQWeo7lgLS2PqFcgV6wsWzXAPsgImF3CYz

dQqUGS2bgl5+Zirgfee3R2D3xEDmfqwlAOzQWJwBCX
iJ8FuBs1b4NV8WqOny4I6QWbUshze6sm+W+Oq82G7rZFjbmjrSWFPP0feO8Fh9Fgyb+hparlm1iEkF4R

y0dm8UzLPh6JexMP/zf7MVSJP8GNKpFcRzt+67IW97giTMWMGgkP5z9AOtjqihCVZDrL8FvO9VGLGAzb

1O6PMQXUTxK3yVfh3XcSlC+jtv5Iit2fMuzCSUT0l/
pgU6U0opSdnbJwHyxGzL/9lcQIRTWlwP2D1G0OvDRobWa6xurwdPLB8ei8h/U9i1+XKNjHmYwNHE8nR2

f5E+KSz/ZyBJ1n7903ZhSEbn++ufemYP/B9Q1sHrdyh/vBALIS07DfPyc31BXK1EVzX99LStZdLa/7nr

5N1SCbbX06UiXntirzaOyWZ5NhMQbOlFQtWYvS8jH9
qjjcR/KrDXDb0UMzSHbKFYg1vkj/UUOQJ2kPdttX28h3ybvuhHwFSAVhD5rID4O3boWVOSY/W2y3SudY

tsXPa3FnZEWYKCgZ/lK49fzFxIb/aDYGcyBV2NUEtYRo6HqQe4n+yXayQZp/nN+sDDln/GGs+u7kTaLS

HgDesnj+ab6KFPRL7JTzcjsl3BGm8UuvirR0bGJnt6
ISNvMezwtRR92eqeDMqg0aHfQ0XTT3xLbJ+6OT4n9C7dhVAFihtY7DD5bws6N+ynNx8vDpKUf/pENewp

bFCbjKAKAa9AXzSs01KJ5mEtD5KrGL8qYzeUYQEjVSotnPAj2kvn8eDwJfM1uF+8Jphgc3SiFJutRh0U

nSnFc8swPZ6VlyaF4+852i4YQ9LexPCpErFvZN0BIq
u31DAl78iUOok/dlnm1AdEfH46Vq2kTSIxBU0AEv8QDndAx2MsRc0cjYzWOebCYjr9PLkJl38ZLteCMO

9fblX+JJcqJgerTb97ECqAOYDTom7xDOOtl/nJs4Fp7ZUdqyQDTFwaMqxrATwT2IG4tbJI+YvQAaMVPG

tLHP7sVDHf/J2zN4XWBXCB6xgOxUNEw028BKSpzqqr
MG21z5thKIHlMzzR8m8ihSaabyS3Ksrq027ISzxFY1IwqpOLfI4k6OppIyeotV/qxOZ+/bBRsDoK6j47

NGA2f92c+G4WoC10aqMsVKwLoRQCs/ZpgarPJMnLxcJUTB0pN+Ot7UrJ2y3nogCbw19qbGT3qyJn6jUj

xhZWm/wE9SSYO/HOMKe7JjoxJCBgrtEJ9GrDCHKwcM
Yar0+RGhlJo8bPB0Q1Y0M5zerTbaXRcoKbFZr4GsKlL5sR0AQ/k+RU1KsEs5unxiOTuiePWch313NiKE

E4Oi/rZ6DpnRmLz9sgkWBLueJvHOPrYsPiJz71JVDA9fENrdKXoy+h8ihyo3n/zlBF/qpC2K5NJFYZWF

Se3OqEISyHcy7knzritqvJaw+ZyyZH/N1Z2BCh9klw
+jose guadalupe+TloBvh4i4jR9E980MUW6YuCGyOtz2xoZFXA2xXjIxcN/8SR8kcusiDKr+qPJGShCsJgq/4P1ordZ

MNOP0RmsHVF30gPcDKpG2mYFb2HIhHErrC1871r0Egt3RTni9U6XL7ipTq/sFnCcAouAHLD1Hfmti4ls

nSgAeT0xzBSFEMY84buWyDrlc+hf89CkmwCGGkPqA4
oVX1wVe5wydKSal9odSC3ee8b9/J6eP3EGPJbLnLExXfVEQQ+mrivaNeQ0kZ/bc6vAI1Bf+WRJGubHRg

Tfo1BCJhvEy+R+Ip8i0i+kwL5BfZTAM/0tzczsDj3rdyO1GPrjun0uzjpIYwpHPkdtOlC/+0m68O79jg

/5E8Dv+2rUL+5LJLKmjFIvmJKJpNrPOZ3eSNO4TeGh
ZjYbzmycnxOZRHp9SnFUVEj5YI15oLjycoq6ng08rvM+WVwQBt0mKhnR1RxKwKZc7qKbDlCwumpVty4g

b6WejtBh7rX4B0Zy7mXYTH4SFRVczJ6NJro9fQoBn3l0IIQu1w8/jTY/s6hPuMQ89I49K656nKTQYblD

hFzT7kfWYlGuZ/un3Ob+VqDAB3d7AB7Rqrmxb37Tku
RaD2/yx4YjnIC7ZSkGwhrafvnn3Yyi5pjPw4xMAWqOt5PEQ+qg0kS+liaNAT0I46CvNBVqgxutRqyWAo

XdtCV2Y6kAJuxe9EkI+oM05odHNfSOI6hHDhKy3QvJbHe6hsGjH0w5teeUBdMn7agjfCrWLeDS1MPa2d

UnybnSd0gaZ/fUi+ACxm26qR8rmSeorzULnb6wkphr
Y3dsp2DkgshVE1dyT4opSqb38oD8QZIUxEjAHLx6sYu7UWXUmF977OK1ewgD5TWrhdX4M9dCDgs1gin9

QzYZ4gLSS2O4fwcU9H0bGb9IRX+Yogq8fnukquKs+fDnMh40zFXSX0rq9/+ivxfdLZk4gtjiorfwfjah

4TOBfUqFtv/082z3PKpavHLRUSKFLK15O4Y0NEyHPB
GMYtArQFz0aLA7VDFrez6i5Knicbnr2h8GAjM2shdgFylZTIGyFC8HUKT0VRTgYZfSExxMpZY7SyDLC6

WtnDOjfw1oc1iuYgAK8CzBk6cj+PPv+NipwTyHev5mRA5EFFAZntZALMSshnLd0PptFyuXK52V9Sro2j

8aiUI+o/HeL1AnPdJ8WMgAfdbnVSbX8B/pALmrIc3C
mw/l+m6hceEgQFtAqTaQSTDEf1rSDlUbMopzM1PVi7JcLyOvRNXeopl2oVVJrDTllzWKtOp5Jo7GYr50

r7PPaYOKK3gTkgJDbcrepWmf91A1Pv6Qt7n9TvvsMRmUnGLjQAUlFP56s1p5FbcvzHah5B+U986yOsRY

9msbX3zCmLk4ug6Oijt0vUzVZ+CAFKPcPkrYEb3p63
nRwb4GmhOty4u9GjEMvnq33XZYbE880sUR967qtEGWgi1D6MO7yxunS2hOMBFKWyp/udyeo3lehXuecx

4eZECWWHa3epbJrn4sl7Z4L2O4vnyDeGCnl36BZB6jwrgQ/QF6zVgNVA9fo7RZgyCClKMivHEu+hNZ2s

THD1PHfzi7Wd9pLWm69xqILX+o1xTmX8Q1PYLaZS7R
aCk2hffwY1zRddwIFQIHe5cpLkgcA1SdI/2utcQkTOnO1DI3NbVsAg3WHsSJgnnR2OPNxcmYGO7e1+cT

4jxNZ7kPin5YBLaoRLJDlHWz+Lqj2Clx3LAjYRE+RxsAVX+6lewOLTGul7jIxU2jxAaGUC/iFcFpIu3a

TtkNmbhgtYMilvzcVS6YHzfArtRqY8yTBdcpMmI20j
yBg8pQO22SFt6Nm/BLS1lyNMlcjYtGdFwzJyss8ZkKtYqfJuFE483ObQf7He1wKErRDtzbdHX1+saZdz

vrxF4ck/PV/KC9dQBHP1Fpi+SPpMZ4lFG7kMtv18aEn3SzOGceQORh6Hyyw7AXQ78FfCvfXz0Nh7NVRB

Iyxfma0DqhnH9MsdCRzDkm2vvpQjJXX1MaNJ6KA5T8
6T9q+1wT+AY2p0vm251+hNf1yL94ABNNCO5Vh32JiF10OrRCgi1TFbFq3fj3/HBfSxJgVaoeXpcAntkG

BkOalpHTyAEQyPp7pHgM4wir307Lh7XH1efZI6QjrycAs0RUZzS7EUxoCnzhmtyK5ZWa22cqBzja9v8a

3PRnXJRdowf9116NS/B9buovjUypJ3I7avva/kz/hZ
9nnEsxxLEjfYSxYbclaN/hTdLFF+p4My7DTGxcN0NOOTYm4oXaPLiYi/+kqtt20g/FgQY/8brPWictZz

cIV7lLr28WEAOcK/Mn/D9w4+6fCnt2jAx0m+bfLJ/HUo1uhfNM7VXO+rnJUvbF01Rylzhw2FEk1e0UWJ

nVO6VxzY9OsEEC20M5Pue9O+dT+FXa7jKWLhrAcSJw
hHG8VqoElIce9R4DXl5od2mni/HYWwQvNbmMxotDZYvRcu66BBnu4RZRIe4LB0f3dRe/NIox/GizcsIv

E70fS4piHFv38FgNlyjShHwCTrMb89bZRr4wx3cjCROmMJ4ozDa4Q0tk6zqfYl45A+NhMNy8EZsuWKJu

oFmqEIT9K8Go94R0fenuW9Ezz9HpboStzvHIIERKS3
UlkvN20sw23x17BfxcB7Q5IctQp8dfxqo2vjmqF4sLzs3B68aQxK3TSBbWNblNn5+ebqwHqdZdzzcY1e

JVDL4g8r05e8p61xPSm9r9mwHbfStfs7dsacog5yYUmARGXufPhXz6Rh3/S9QdBuIWJQATr2K//73x7c

VFaly1mDZThHIwhAk2RYilkdnsTfhybYLb79Ym4wJ2
5d9n6jNRCY3ZSLATQIFuFe7rrVFmpg+tAjTz+TROOYN9vJePy9rr1Kqbz43wgXQpckRyHkEEgCQPq4VS

DONNrVDLvDMKEJKVkDwhTx6E3LZqKD+xI3mS2oITc0o+D4490P8L8/zept7WauFb1AkRBzj7wRr9WKMC

Gy/0qw4+d9PS+WsqLx3PdnSKfB9+PNvCBgG5Tt3urM
7TpNsOMZrJFXFRCq/GmhaDHCl2B66PTwQWua2iQZZxXPjf4i9Bf0NDG7XwaS/rbbd30ngWlEDD631nh6

wTALGr6HGpVDASlSxpkdPi1hOrqdPn0gw7si+noqBrNIWPvHs1zsfMC+g2OtLf++ZaZLZA/hyJJt/ZFF

8jplq4sNxv2p7AuOcFaLU4lc6w1jHIHEReiD+t7LLX
OxNdfy/ffCaMXvewsbjYTYoY7TO7u8bTuGIXVOvX4pUrbtKuNoiDdnpzyWbE1z4ddp0RXPMvCVp4PaVu

7UjiLnf96BP9dIhRTcDvVZmz9BPT1Q+tK/ZtQbypqyZn56q1tMphrTj+yn5lWe1UPipj2nOoqrPCfb6K

XfmrNtQjFinEzCHlzvnVmBDYR5Exa2TajUX/P9H9rc
4I3H4HZf9Y7I3GReHiE7lQkBbGvyffDlX6VaMZ9bjm/PCAlmywJibmStVpZVXAka4Yu+cTpUBzkL6CoX

mZtYH+GoVzSS79f4aVeZ1ATaYayPW9Ik8OUQaRdcDhHp6hfNFJe28fNh9XtHxIcgxszc21ODAU3eEEAb

IiodM/CbNXj4XuXi26BZhirfZNs98Z4XpMaBLTVauH
oLAoy/ee4b+4QUGu4Tovlpkj2vkqAZ/VCQJ1Xm3F7b4elwqWU+XhGTj9nvJ+yjawB45Ui23QTR1fa6JV

0TWfLEOmwkLO15HCeHBcz1ni6dGzDQ628k2Fyjn+bRqwKKTlr7uURYCRz/Dy62/e4TqgaAnez7TqO7da

VkTtV0fJogcDEmPwuxuUrdV4MdFfj6ok7Gw+DIdV5e
hPBb63CuMnTFmT7n6HTcwZUQTcx9cQgvyLhFvH0lgdSjAyrT9ybdnnEpbQE/8hqBUr5m/XoZkuhp+Xhj

4bIXWIsbF3YfVYXGEcLNWZ63eWcBg5/c73GYG+zN+NsjKRIJzQxcjCkypGVoctIfc3WDhxwNbY0NOWVG

uGElPS8FW2KV6D7/ls6LZY2IrPE7DphKq9s1jyj6HY
yYPYafBQ0jb+SBhugmKisiC3hQDlkjfGiAeAEpptnjBCnYjC2oyThBDHNZdDr+aJBcZeBpedHQswQdOK

8boyCGvEoFcqL82ij3S/YZifcSmTRU+6ICM/XnUAIORBAezna1mFNNreUpD8h2xAaisoBE4f1C/INjde

LDhcKSl8T0pnn/BluevUH+gXMqIRkLZDQmO3ih3Z3j
5XjynFgcZdKHMIIquUy+DRbrjjN7yAkYR1ah6TRLIsjfb+d+61aY9rhXndjmg8AV2dRSP8H9HdbfquY8

wBBU7GZeVvN12Axs1XnEsjwQTvRE5/VfYrUsnFRzy1RVFGbTAbyM7RfzkrAGy8zZGFAm+ru1FFWqJQBG

O6rjy+ZJeqLS3LmCGsqKtU37R40n55Xiq7aZGqpSWS
WphbSxfAQ2khZAj9UcYNw4fRMFhX8gYIXg3SU8xmArhThwPr2NbsHawq/zq3+8ToN69FHcnt6vdWfG+t

ncFM/LHbZEr56nlRGDQ8iGsUvr6fapyvLyc+Rp57XIEjbJoGzcOuWbOuzWZdvXie29LeQxi9rXpWth9l

wRWUpsnC11ZlBlhlRSJ2bI+IweAomrmZethLx2SD8A
AuM0ytLdZ187Vy0zcx3bzzR5pvlpQmPBo0Wvb+o78pVQ9+q+o3HODsnOTB06Q4MhuHo13eB+HhdAz+k0

DPQrvWL/qcSFQUwLHUB2f9a3RmVkAQibyfeTBQNNEO9cNM3Df8k3sOzkfBXXqeAj2mqaB8p+PPkCyfjI

u+Upob52M0QI572qeKvxPlQ5ExgGdXT60s/jDjordR
1IvMb8trlM3OxY5dG0qnNq/GrPbTwFQDsmv5XfrIK1FECvWe+x3/6DWyFRAaYRCOXFaF91ds5Fwbx35G

tSCUO+Jx8T2idlvS8GsnC5sXlEjWAZvL03s9x2FsR609/ngEeUe8NFHlYN+hHr+yH5N6vShwiXYSvA9H

u//d8Fe9RvuwWAYN4L2HJkwewJO8Fnf99aSrwq6Dg1
vvBr/YLsbGYZGm2uSz3DudPV1o29grVm19I0ubX2tuQDabtjtoAS8MRCmrvFPwWfviuq6Mq2hRRd1H64

ErUJ+ON7avmkMqZsf+KaeYw/n7F1nPUO2LeofRIgeJrM+h6Qmn5AOotooDt+vRJc4cCFWhiWzI6fcmwD

I0Q3v4ZQ8NIEtqwCCN2HTOUITW55uiPLWKj7rTGrPB
56B+QYSbM4SZF5NwAYTG7OINvbaUjLkvfAWdE/gDgfmBl87v2Fh46HBoU/tJe7cIJYe1DdLCdfQETydg

6kPrw6GK88Kx61xP7joOgIjBNVHlLmzpN8d6rpJaYjFYjXZ+9xfsqy1iMz6CYbGx9ew044JxF/qd/9vp

i4mo7byWlYU2R4DJM8S0UknywWU+puV/zVue/jwnDs
88pCVDUhQlwbIPO14F12qgBPX7JQm2FoKyErdvSrhbPo2ju79dSNRtwssTvBKmnZlYMDj2MQoHAyCRca

r9KOD951g6PymcXJowZc6ycb+6yceWz3Qr5q2ToC0bDFR50t8NBFugTodeesVQ1LQL0a0qcq8Y+5xakw

jMmygNIFcePmIPjoBzHGipdqmsw2GSxNcx4XfqEstr
isHzgD7u14REmHDy5R44wBVXtWDr/wLLUegDjQ6znm3y2Q6uBb0iPiliA2c6FQtw3gTJ3d78KUuck9dC

txJHHULxDWDuLR40pMwfJsjOgJWcV2EGyoh4T4TBNYsuglN9TFVUamNbecxGS3wpWvkdoKjwrbv4cxjk

Bs5UdCh4wAbklBtfpd97djgMaEhT2AW44vHgXZYhD3
/EeQKsDJGkv6ezgKwLjaOODd1CF4vxyZt1ACheyDePGnpkLpyN/and0h3ucgphSiYdsEXCUSLFFkudk7

VOyF8YFUnZqQ4FpTyxUuLKbI2gmQQBlL4rDYaK0+eMawxkblI/XqtXNZ4CuSclJiGlTYjCTu3uzA9Uj9

pF40AzfI7SDCiH2xOcDieOKD142QEOOQUzcYu68V/V
SwJ7egAfAww6lwUB2Adw/yxytJpzMakeAwrgLdQXTCz2ehb6Y5IqVAxXswZs5fA+bHmFww9DTALbJDv1

WI/3dGtJBzvwNPdwhIinKXByjFE5vHdkrf7STaDzcFLfp3a/fub1H8P8d7zcJPp9R6wM/PqigZIQsPoh

2DWvEXaBp8NSbAtUI3U+1l9+s5L2bW+nqiTzmPe6Xu
7yVkXWbjCsy63khm8UBuq8H14Fi43hzwHaLjNZnYjMLTs0YwQ0zkJqoQm1bIXvOp3kFu6/f1Bh4QyZ0D

IXMG+kWNgUFB1pJl0+KxSWFBtt+bjP3ZF9xmm08ZmUjmVJ3krWvjKo/2tm0HOG7ylVzIHH6qQz9CClas

ReWdouKv8iBNvJkd9XrNfv/ONQexi9b50fU3nWLz24
pnQSlpdlwPvSywQ0uQZUpBu2ACP8DKvTdHR5LfWj6dLlw2NmiRI3L2UwOu47NpY9EvBeD8eJ31GiWgEu

uB6ttHPixJY3QhyeKnxTZ/bSQ2sOXzmYpwM634Fv/uABSudlaaU/hMXYp8BqdvzvRwLyKs7VM211tIG1

Ekra7nYe6WcgVSGNLrLHpnAW+6SuMLwtXRLydD/wRD
XLCoTidNME0nteu968W/aDPzufK/Fuz/RwsXP+NFsjuufAmn9uwyaJlZpkiz/yk1lsR5a2LJmU1H8FI5

k659lqbHdmCvEpcHudX1bLA7wYhdrLivuRarbr0z7aqXfh4XFEQTrosFJg0SgZaom9iE/o22sv5B9sYy

L66rEB4HkfsG7bRRnQDJT1MTnWpYN8mpwz6Jqy3+/t
z5AzAFbxIc2375nRHEPUkL+Kbp7D5zuZOUKheJECJCVcG7jhUI7078wybJ3BTp4fmUJ/GkHXbwpLF5F/

se45KrIC9vjiQGnLj1GilFE6Jkz9hsSsqOvg6AcHJxFXpXR49nxCa83jA83+gWFQ1LQ45JRwqLK/mS+b

QyVdeug9EQ9Rl8rk/S0IAuP6Gccen02HCnC+Ejj+Lz
w4eVuCmOvWLG9rcFI0nQP7jV/8tZtS2UMC3ofkSJ+MAfj2X+KP0e55TSVOehY07JifOSS2SSRrHHS47T

6UlYxKrvy/BQ0e4Dwo7Oj4rh2LAIPVHU+C38j0aW1sMnh9CKPHoZ73W898ZdiIK7zBr2rtX8lxpfs9YN

J2XQiW7pbCbJ76X7WDymerwFPMWx6gZQp3Lg/6nS79
gy2oqUTyybbqjUjELnlRHg2T285J5iZ+VOtScCRy+XSLmd/TsrkeWTPzNZTg6qr1DopQzm4VgfD1tKub

+6XBOc2Sz/lCcwBnEgpg/Jr7A1lF16LMWo5KemLUZ/03b5QzDGTVLzLRNzn2iXAe0XpUyNQZiomh2cA3

C0NJDHQhA3CgQ2EcwPEtPDS10T7rbh2Qk4KH67L3/A
Cop6qTGUvEFTQAr1uUE0pf1qwafD1O1cBY4nbWBvCzM8s3YqIfDKYI16UU7AYOpo0bNh8OIT1C3XaywI

XnYTHEeBGrjy8r+t4kgNbgEv0N24HslosnvEUfVpkT6pB8EwiDRIXB+J/ZP5HrbPb+MCpDd5p6oBR3TF

KDnbAhkvT0Ta+y1h8sKF8huGq45OtNJSPQ+ESPMvxy
GDFFJfyXZOAvwYMxe891H8zSoAxgBJTTBh1MoeOKx+rosWcnOr1hAM5fbXX+mB6pNLFca5TEbBMhXWzz

t0dm2DByx5T7sjZJwItDm/pnUd9EWJTK/FS6KHM00z4sXPDn6i9aBl2bAhh3EEGGihbY4BllXAuf341W

gG56ZpTOKbrKSJmvcg+vNfC/wvlzxm+7CMCV47pCBA
7aBT0AitajFv+nc0KUz7wxCxoPpRtE/kbD4mBDx2bzHlnSlhr/G0sHRpLyNnJcNUbY4NHYdY/5ERusy0

hT0xr+lutu+htJiTeqzJ2mRtjBB0lRl8OjNsZaEgY31ymadQT2D8P2aPySW9VQSkBdsag6GmENCuIY+F

hL0ak5475C+Ofq1sWosMilPbq3uRQVSPwf3w6+OCbm
x4G+LKXYssduBm8Of8/f8YPrByQbv8iXufl/9sgQEbEa4ka2SP241NLEDpUBuiUjVxUjKqORrofG9mFL

NtXZ0eckEwndZFFqgcqqtmQzl3C7rXOcdh5e5tsbgLXy+piaG7ZFeuNSjRib1SDwukeG+VeO4Jhf7/cG

ZF2CqzbWIhNTFQIzs3IwISkWTuR+HShr/9JCkcitV2
zr9UK1MYRnWpqDlRPp7AUKHJMuBlWipTOz+4y+yTNxjuzo7aO9Av49PUW2Bo1qhda6iaeTmxS3SOryRl

AGvVy3zT+Z/JY41rmkX0h+CyqhYuoLHBkTAeQevzwthswSXDqfiv4uTtuWMImQpnnJhcJVhbxkZubCo2

I34dgcVWsGEJFO2J9vMR2jFuoYRLP3/V6LpGdKsBI+
0oPFOXdQeadundpBwswGFANh++Y1uUiPl9Y8XOOlozxdyNFBKTPdxbCcU+1+2Be2JveZTo80Ld27yxdv

VoCcBkRvKWAR3hJk31hybK7+1geKjKsX1gV8MJDjXgnpZN2dFG/yRnyB2jn45Sanif/ztXe0OM0SXuP0

sYeXT0Hm38DMhQBP8nCIDXKS9YR2pClVHjkZZ0OIvi
1xFkeJwpodWqHcnoNj+2RUEMb1aPzdhblKXX/3zGaxX59FzF2GNPlNoFEMAcF41bqRSacfRT2TNKduXO

MBm+WMGLcRlv+CblNHh3jE34JbxgVJCsS5VXTlhTwYmKWjAZE7/3OI/RBn57TemwlmeeDfYCtGEwPWY3

zJENCT2HdpFRrbkmYob2Kwr+fqUJNuNc/BJ6aazCF7
zMsmNhkEy3mpsNs8GlZd9ChLZvUQvdrLPTi2ykugpxrMNrc1mS5phu3ey6Vn3DdqWzvS2a3TEmOvAj+1

J5cCfIdArT+PPhzr2i4NZadhoFw74JwLN3y1ktigdT0MhAq957qMku3KINHUU/W7UmbL1CQdUn+FqI2h

WIOu0qcOT7DFhtUwy+dQzqVCv83BJbrPlI4FGNubxO
EHWnN2TJzOaUnFey6lHUAmJ+V0MeFCYPrXgfkoK1HREwOgHe1nil76OS25yK83DOCtWyUSwfSWTKBl4N

RM8kbU6KJ9kMk4zRHsx0Avdsu+qF5FvCtrEaAjBjHeJzh0FAuNkFK68R9WQG/Sx61HELV7R8DoGOM6wH

dhaYgEPHTXmOHnN2xdJParhPVRbf3GK86CYaN0kysB
QWT77/3Y3zwg/EMZ7+7m3nE6RqaCxxGiMFYMqQ6fMpkO4zMf0WD+Evo6i+8MFb/jU6epRONC33U2Zj6i

Y6OoPH5epMr6OkHYTqqkY+D2vPTChY+dKLuSgLoaCgTzG9jeClOkOuDRILAcm+imBYKovRtpct+44lAF

XADz6RVCIdq3bljDAhC90ivm3+mpTgq4/pO7zBMDce
8Z9uxiifnqAuBeFSrUyuAnhoPGrsFK9U1exhziU5wkDqqYE662wBSfm2S8zQXushQ9UoxLhQbWihhqO/

5C3w6ASJhIEYHmLEB+BcYgXKplahFhFyRD5zoCfFxoATzQDyDOj+0oknqv1DScwMhKdrBz5sm3dktmCJ

zDqGpLzIzV6dJ53Jrv6EueIUHyH4838NIvt8AFmt9W
B1aN5A5q8lz4Z+zN8E0O/zdkaLiEmd2R1APX+033BDbzh0XmPHxIiOLOCIGrDns1AH9n5zQg+pW+it6M

+mhgPuwD9QleizP0uPUe9c5o7yIcF0X8Ns6tIlNOdBlpxgqwrP0qh5DEaobBqNv5xAIZgqposbqakTor

WAiEhemPIOvBTBpskiv35TMymrEYhxdISMoBTWKF8Y
DJc7GH/hkl0E/Qwev1IpstU2GOYN/Ps5AwMg62yqKbQRNBZmp4Z/ifXvA8SmJttVcbHDBdHe16bTPaWa

3M+uuO+JKtaWV/yBNO6/9EGNzAgqflWXuAqWejSMzOwXTpaYKxZjZdUcwToJ8Li4u2DHxn10kSKxF4MV

lhwVBmKKsDOYw8CqBeON1/RXIA92GfvV0IiO/dAsbx
DXXOeLqrH0Zqmst2V2eDmP/QxM82aIScatMI+Gx70I+8aMvlrkxBbcbPXdHetFZCzEPnfCNwvo0c3eIT

oXDbKAex+8q+KIxkLXXi3iVlbjm7251NdC39pkR8xBiAYq/ELnKUWBFwAhgnnOgWsHYqmtPBpTgf7CSo

IICZe83S4zNt6bTbeeDBkHD9eAbyFDF8sETRWt06KB
6UDcBqOMiZhJmX4bwkd2m2ohiD7CHre5Yzyox6JIGARl4TnI0kJs+GRe5AqLyLtUoBimqSDgDf7YA+2k

ijGZjG8VDsGvDZE8VCxjhVWjBzeJmbst1m1gssd8Wlp26XnURChrj3HKMdf1tFK1PVXQmlAs2/a5fi5Y

0Inr5wNmMh6NzsuNpeFD3/Zn/wsXG/9n6A+WKAzeRg
V6QMmkh2i/LIw7Mt7t6PvkrBk32WAhnIBE5iAUxFC/0Wm3EUu9Tn7tpXyF2qaWbKkHxktbuImCqPcBZy

fbIqw3PX6dQpHLeQ1lF6/2DFR8+x/7CcamenOS+BJrbI9y3yzewcL5DCtAX9mFPujuPJjhMPXuEPnG7o

Qa79KpQ7pAy4o7wcuEOn/cCStX35RKNEbi5y6JSxO7
w653qVNtzJ0XbP02mN0ly80WLUMZakZGTjtpZU8OABDFKFyhU4+y9nPlNzLQTnOBakNVWrgMUCI7uv2v

+VQNLGGxl66rjLpCW5y7ZPV9V35ukxGZVc+0pGCnEJWIQ1ZjDISAuVgU40I6EtJdfVXQYzbqxD19kpgu

O8c6SWCAxvYiECJ0PL4eMjej8nGSZ6Oa47z1C2GcIh
+9oY5HtbGRAC1SsPdYRL4McNVvPNftUtZIbyagRg1L0m5yb9T5+TdtjXZbRAzI+h8XGE7YF9mct5sk48

AypdXWHVIePqEIK4UJMF5s2ZfKhnN5C4DBX7NfgH9Vdz4dIIwlJ3OZhdcCOf76gAobQtJzJ49C9k4Sd/

80EtpWETi/AqLTG4xZZDhwBZbx1t4Oab8jR9/38QEZ
Pn8j0NKE0sAgUFsdgBG15uI+IsBMWrxFfiuRtfoI1AbliE/ByYCvjNpWrhLs7xiAoAPKkJdLf5rwU9pC

p+dLheuzSV/RsYj3wUco1/zocxI4UuZlm6M5vNEf64MvAZFrkfHwfwk6XavEIaGGn7tDIh62p5hgrRMI

La+iE5rF+M5rxFcHA0tpW0x1g1+8pMrXxwO69+vLDz
C7yoeI7Z4JHhttmIbp79tkBBBnVctuc2XtCrzSHBkkT7l10iKxPMvfQFNdvp8ifV481zemvZ+UOb7tTy

ZoR7ozj/fjhnlauXJmB7F4Xg31ToNrVxxK7lg3UVwNY84UZyPQPdE/MxPTb8pfwWaewAnpeDJbY5Lg+y

9/wQYFfdePEgB4+ATIYZTBQ5faCh2MInueqFeTSran
08g4h8wN2C0e6z/XgAFgVE5EGvi+DE6Z4mQSVvwXor7o3s7F9hLGS7XJj3/s7h3Wp1m9eZlnfkdhKaE3

DqvqOe5FHdGuPaeeNK/zVD9CGcf6ueJNe86oIKxcX2wkx7SYn85R/8jGAQ9KeK32fvcdkVrNxdsLxme2

C70usqJ2t+aZts3/vxdxX1Vu+6fpDQWlWTHnKXfAkW
knDEUsHYcbgVcfjdU6hzPWbvrJ2HzL5eUEQf5ug3awHtwko0cunLTlCCJg2viBpacenf3UfBnPFkiJi9

27HskTWuBFeAOQlHXVSErdms56hAbC9xa3vTw16gGV7PDohVySqJrHkVAVrddNPB+cMufpgxYuT03LTg

wxB2UJgT+mywwk6W7teMY0+y7Id4SXu9J9wEdZQ9Sh
dtWtcKtyucG3VvJSqBBx+eefdgsXgo/an7SMfoOXomORbRwL2Jx5EQvezOK/GGkvUyhX6lx2Gy5TGatP

5sIxLxX8xv0+y08U+brx4Nb4PMjHIwT/USW1zr9+wNK0kk4mcif80u9Rg9/cYmJNpVnzOmtzQvvPVq9+

eOaeLHuwMpQuEe18RVc1Em4cpg/7eWqstIAaZoydS2
vvOViGPQzR0PIW26eH/KwFhqoktiwtGC1J7CHel944wniAO4zPb3HhPmx9IXmyhpyU4XLx3VbA0K6UDQ

wENfMmfFryK6jCqx9CYUF+MLXoQ3jugUVo+X3l4+Y/B/C52Uyb+A5g4+MJrHLa8XcwMbZZlh6Yvyc5nN

kxNyYjl7m0jGU42z0t8kEtrhfivr/sN5IPaNpoiHbj
QVD7vvR38/dMKhuMqaOz6ezR1pbxHS+zOtRaMLHH6X94sOLIQNPIixe/S+1urBdjTAk5LM9TmxnMuT9B

sKHGBna4QaGALeC1kGXmvWGff5QbiWdzvN63yaqGodZzSMPhXlAirvvby2WjVqL1YVAsWAfaZG/rjj07

xxbD+O6bIhUhjT+UZ45Q4yOFs88ykYVZttm43qd58r
3wePU8PCyeDAUID0FQChRQNBfK91H9pVWwZctPcM+HuFOI6jm3FtRnRi6rWw2yduxp+rfedeai9Ki0//

Qh7h0eGQ73A4qTymhJx0hu/rDNy2za0g7/BfbkhHCt8HNKTn6KWN/o6icC2zgbbasuGs/bxb3SV08z+s

sCIQ4KfqkjEJQrqYeQu/gBzWM55DNX9cj124n6gAvT
ROGYOV2cg/0CYQLsj3m6kwonDv7jQlBCEI/vEfquQFYOA70cx1LsT7pwyOTECNYjtWgjwEmUha54W+M+

3bLjvKY/qMTL93rM1DuxY2jbphU/doTd8ax4dctYNLLSihYuSgBPmk4kBL8fc17Ur915KPyN5XTIBUyV

GT3vmHb0vhXLQCzr9h8nSil4F4T28vwFKPhs15Pu1Z
5o38WtDK69D/wX5vCNZYre9CyIjm5Tx7YphbAmMaOP6m6n7tX3j5dooRgt46fNccFlHVD0nrdL/2LSek

i51bwFaqSrr9iS0IdE2O7pl5LAVZ1Bl5SzaSpKdi6hNbTDnMWY+snyrp+cw9Hw4n9c3bTWlvI2a+U4Fn

UXWHtUPtQ5xAvpE10CWSUTheSIzqSgpxU4oTbqanme
QAoZXjDKlMiIGlpmUDu++s2aQK1fStTbnx2fREQ/cuB35uJgDWn07SR9ZF9R0jn1gQib7pnUfzxAsEP7

VHHwYG8b5W6wKWchFPwhw+RIPDe5VAGUoqdSA8z9kUbZh4XiSdqz30SuitL+ST09FFJUXg608HjWyDg/

R7PC11wHZI5Bz63XL313rvpK9nMBbmCVVsSgk7849z
3tL0c7tgWElx6INSDjKh6CL34eJvTIw1CiHpzlB9Vcjij8Gw2tk2M7gq3kflH2y6lJRVd0sJQ/cqL+66

ueBLtyzv6rth3MxVx5GrortzwuS1e5MrrP73t85P8NaQI/zive8nnR3hVkseZFADWYyEHdUU3tnbzV6t

HxwbE8bFiAxvrJ8YqjRPNWW64XU/TAh30W56dT9VB+
QTke57mhI/BXDW+EnRm0VUOsV5Oh6gZAp6C+tLPIAs+23GHrsJUQIVlTeM2Ch6aC41ZrZjItEMlKTcvH

T2JTaSAPIq0+pE8d0XjfOkkP6bsTtmP9RRA5upUW2iUwBJ2nE9fXggl7q3o6aRymIwfq8QhjS78yT/OL

HorhHgNNxOVOzKxg3hxMvC63zxoBXbuVolF86ASQ2L
hOegl3vd4ubAKJSUzICqDomvii2kjxRD6JqOBbQtyPADTF126IZjBdV60uUJ3/qWTe+h4HS0kP4ldza0

sA6Am8te+BjT9i+OQch6xyFa/xzJYRmqU3Xazb6LdKhAfN/kZIOFFtatDDarxqk12k7gV2Cd0GQE3lDI

4bDxRCy2G10ZWd56qmKDL3jk9LFuza1TnsxofZrZql
xt0BPbCr35YDSu2ioqNMRx5lb3HMcRMDX9LvS+kjnpiLFhVpI09Die7jKWn0vv8kF7ViD81H0uUvqWmU

2zxKKcgwbLmzx5rZPSd7ARO9InoeC4mlJ/+1Xo9erxmZ8vZQXd9h8E/13P8xAZeZHCPjpYQIkfFViMGU

zdvNLdBWg3VDk6jxOpKK8GB5aird4B2a/FEnllbd0c
UesdkIWcgDzYFvO8GyrAM2YCY4KJDvJ+tut8MVoKVe43qS/5hmBLraY+YPLkAglEoxLL04tr1Rg0IWhj

AgWOinYKWgqlO/0zKsQwyTDvFlWXlwqtdWrcVSHhHL8M6kbYlrTY26aFmVhvi/sxWGQunJwfPe2sLh9+

EwrYJ9xD/brkg/OykDHxECPYVpkEpM+cDZv0kiy+2y
fTsJhECIvw90djm3yI655eJcw7FMYVc4ukyz1d13xBWXwKH8DmgGb3yPOgXxf6tW43iOB+34tMIG6VyE

0EAJlZMgk0Nn8z1U/cWJ4THtOe0JvF/eKa//rz/19m9npiOCImVlw4VtWF07KQyQyTxh7zDmQR1XGllH

Ol6c95lnfle0iUXjx4jEqQuZnxEYAFDvhokDte/vWC
r8t0vO2MgkTbquSveD/e53kJLaRCBI0ShnL4aa9xWMaw3PI/lMVBd/y5OPedsR4b0H1W19WwXyhtwD0s

PmmrNGiLY1oKOujmacg5kxbuIqJvtXb41v8Jv6qzY2x4WuzioUvQQhaugS/dkdz22mDYsguRwq7UFofb

i9UX87hn1+kteo1HKt+YNzpv2ogZ7Hhdf62X9HbKGx
iZjCbYqOljcHj1nUopjKwMzCIPSZWSOlSQeug3y6DzraZmZJS7/7/be2rhE8UEm4p1VnT4EAJm9825sl

PnCiruEFu5h2cOX4uGH12qv5DOwrC14qOomBhjAMwZfyBJ0CPkwLqRZE1lQuzxoLydngDyuJeMEEVIa9

V0wzxb6ViyI75SFrHc/aYsznw2dzom00L08GB8vP8T
baMpq9XMmnS39SZP/RIcFXzmX7PdjVELRk+Uel1uOHrFsGRmX/Oio8nRB19YID/4O2O1Set1fj6LzZHY

+kMgj7fwd70bWHbVFbElyVZfD8TBFnHjuC7WO/j05ZlRNcviMc7LSHGAefkf+qN6VHwBTxIDIuIWniZj

jWMBCGq9nMb4Ee7dDzJnXrCnXkGdcDHkIWfZ9TnSst
Hp2evrLis1hRsLiApGvolFWZIvd/Mq6O/Dn0681thivaZqcLvFL+6JuQR9p4IQKsjSugS1V75TrSltT3

1QzzfgDkKepIa35IUD9C0iCyHtDKuciUhtt+MzMQ6u6lZXZ3jjk+EQwWtRyMV5mLiswRvjkbNf+qH8Kn

1IecVXwsEBUCc0bbmmilTeg2FraFYyEkZBqE62MMF/
he8HzWE33F02o7+Rp24sPXU178rGp9UjNPZ6alcUa6PNkFaovanNxt5ylGEY1+5k1+Z5pZeQIAt8L/2l

xAyftRmtowi8AptAm5tShrVVQzUyVS4sqbWXj8ft3zoii7K2L6SzDUtI1ECUASs4Lwj9UbxqRssipTyB

rYWiiTnaRsQmZ01rhLsINIKgq92x2dcI5H1/idOaQu
gfKFm+kfZX/MxNvJAf3ZIoZE+PDfaMTysw1moXny+iphQlX/Ug21FpkGKyhqQVTwALPhvbptsQAE0NOa

tfCls02qv0t8Y3RdyDA02c6oN0F8lICVj+9WL3K36Tsm0DWxWEyAzOBem7q7/cX1/OODEd1HgbHlBIu3

husf0d5ybwPHg7o/EerNOXW5kfEj3XguH9N3FKKbnq
zA1RThs4NG++vUnkB0+Nuv7SoTA2vcAgwWTaQ5DN5imfdGctVG3pLSFQNu1I4AIhiUPuTAx3hGU2/8Oi

ggXQE/l32dPNWV/mp6LCd8uQm5D594KwNwsrzkIRPCTzRF4I1EwZCu77/exTHxkxVyyHOk9JNiZ4NSyO

yseS4MR9vc5Wjgt8MaAtgb10dFpF7Qzdxpfnts/zG0
2814rLDmq6UUfoy1RBjweREHGvcnM8HZajETu3y0ThEIIWg1JhvLAmq4vGxFqXWH2pGzm6gpB6rQEFm1

ZAp3Ok3iwJGGK0iNqWfqJaIjn5xv/yQ6RD5Y2hHiUs1ZVkIbj5jXPOWhmUwsGwEzxaQNrykxDVvK0/Cb

23Qpf9rVues1vhKrzuuOR+e0FK9InX600cwiw7nekb
tPor3U4s42J3YQN3IAH+or/oA4xtngKbcmYTEocRHzgPutCVPscShc9O9yjPjkhtdg08t8s1Is+FfjvP

Mz9wwgxd3JHGFHLs7WzOZ5A5Dph2ynqawY6Hfoq325yeJNjwS2Tj3zjZWfXuc+Lc4vfV633Qb2b2uR0n

Zeg3kkL0VJEF6Git+ZWaa/GQpwB1MDzXan1gVwQqaf
3Gwbf+dJsMYOr1C6XwCYHMDkpkmr3dRi3DW1vU8eujQJutDq1BpodgMh+WSTwCSejpc0e/aYVS8SH7sl

WE2r6BIoXFdnQvqsG1aPlYqk1d3QtgbrYSC22ETGGgfdrTW/BBrtarPrgBRFuxq41ONqtYMjx8M+0q2N

vXnY22mdYFcQeXW6JHjkiudqSI2Yji32RGb65oV/pq
UPvuVxV7R4i8EJ7p1qaTucApEE3P/xDkvy/2uvtW155hF0w5Ug4ZPHro/GKlUHte81ja4yd55/VG+3IM

Wf202cfVIc7tOTmWiDeOUitXYziw/oxi1MGjQ/XvJGEfxbqDvsrh9J2ZmRMk0QlTE0V5AbSzekyfrJ6u

eOiHJ8fokkq8ViueSmbP96xlSVhqvgGAd1meu31FPR
aVHmhGtHwBNVwibQM/JYid/NsDS5t09BmmIdOy8X5mqzjBNAel2omNYqw57zMeagmHyYn35bcduhVl1j

WV/J+vEylZ7q+XifZ0Mizb08InHAUcfLjPJvJgNeZWvfzCeLTaINdRFDJSXEU9ty/9O/LFVwfSwB3bGY

U+7I+taURjxrZmVHJOzFY2nLgBTC7g7+64QzEKJlbX
3czuP0UFII1mXM0uSHh8dYjknAiTi0hn4mDEa1HGGXaVhkXMQmx3JBoNjSNAcaMf+wEiGFdYytWZWPjB

tSCHxDB5U6ckoymXmVluBDO2ahIneHGsRe876CKfbi3lHCAQ+TJ8P6Tq/GQw3ghv85+qBs3z67CbDXLG

GoLxiDwPchNFH2uG1zwWWM/7+l901OFe6mkm4QACEr
qKhJSHgWo8YzKjIDFLRObVKY5lGnZiAfvZTCXnewRgNZGhJzAcDW2KZZYAHFlDg8063nwjc9rbo6w+ec

O+ee3x/faDgeUb6bpkmgA7o2p7iXOr6CHQl2AqRc6zv0e1cmQq3dAVSqk4hHDo9tgmXHYcz2dy5jxasP

fJ4lTFkBIbrPlJLL+1Kop9Ij02/FbBFmSqcGjG/D3q
eQAE9bJSzylhMKMOSbBAIvM8rurVQ0646o2pyRauNO0Y8V2QRr0EIR1GQUF9wTkA5V1Vi7MxG+68f9hI

WvVm1c94hXIn0hMz6rrdBhOOmb3hAXF8DyvUEgL5Fzs/f50vh7wYLKLnU665huq9zVQMB5307JJ7c17T

r8OEUylsGjX5q1G61R6oaFXgZ5Yu8tUR+fDCFqanfv
MGEfK1s1aM2zPBKTZ02RP/H7FQ2a1bKSIPtdpwUEfWBXE9f52fkrlMiuJTKV0NklRpX2cps0TPPSg0Zk

0n9z0OLKtEw3a4ISRtv8LtKxchMlSHmhEAUo3iP/N135q2+JkVXrAW5hLo9nxxugDJ3+ovEtRRrbqmaH

ErzHFa6hwN/nh/z+Zn7XM5YIjeuV5tCSW9aGKQtqaG
5kiro+QC88dBSNJLAo+k+OgZh57Qu3/+CZBdADtGWTsBqRH11c9S/PsSFcAHklveach2cihhqK/j3BPn

1htiU/Mckenzie/bjH5LnyP5CXZR/fUXh4ZgL6AuEZy9AQiwBPYIaBYbCDF1EEGoTk+g6mU2FxgUATO57uT44a

nxJH6R8xPYF+b469n2SzE7PrWtG2F5u8SXXT3n8SZD
5NVhPwTgb7DqN0aRrSBbzSlriJvtBIH0e/hdQNA5vFM77u1f6MJSVox5ly/yH8qj6GHTcZdc0GNbjxSF

ZNFW/yHFe4umLQRxr+fnXxKIX2rlpfCpH1Sy0fba7aIovDMotdfofKBMtSeXoYptROasHqKHEGK0Vhaq

i+NkTa58utFks32pR1xtcPm8y2/9N1XtLR91BhRBqp
FKz8mu5hQD2WUoyUxNK6ZHs7SuGmco0kQFwcKuIZHs88imCfC5/mUNKC8w8bQDj7oM5RS+0D3aJTvXfd

JW5ffr8u4srlvFGWAmPqhA9mk16hfHDUq3kv+MYIZvp7mzwgHv3/MqeyLPYCo7KAhlJ40djYPW9DXuLK

iLUwrRT5yVxsydDpB3oBIBHA7RQ/pdGO/0vspcGcJP
YSww7HMrVR+AL+/5iBIvqhmWTxFZn6cn+nJ6VJ4mOwc3eCAtA8/Julian/bA76fg/FSNRJwIiP8oyXk9DjB

yL6xbbe2oqxHy5Zjnhd92rRCXl6g953Z+LgTiEmko5qvm7AuvsXxKrHNjNuAoa/Y3L671fum+EASt58E

tMa4wvkhGSN7VUr0qxrink1NVRZlXwW08nw9H8R8tL
NlB2SiH7mDTWE4N3+K8tm/C+GS8voAyNpMY8OJFcdeaIxk22+2da/lk+yu26TB1ok71httdOG40yogvx

oViIUJ7MEbliEpPOC5k9sobX87vnDlcF8kFz+I2AZPDGvYBiBpFn0SnXxk550R3y6GE6o9zOgPsteUv4

gP67pompdzHapLBDdk/yMQIv5LES44oWfhIe5rsiDk
EkllyUnQf96HKYshEETOBSf96UMHhP5bohIeNeyM20XXyCP/rlZfe9tkj4fTVxIV3K1uNrzn5x4njemT

RHFoiZNDSelEMpjZ8ZqfgYSCqu5h+5lxsr6RfcsL05KxrlmyVN0pAChwgse/SHNvfxo278ldZrsbV5A1

+B8Mch8kcIspn6GNo/R2hgAF623DNbD5jruaNDMSRY
GBZQsyk7L1kQHZzq6ZOix5ufnj6mJFbrzlC3l26ZARgPaOwSq+5st6dqjH+SNFVpwJj81GGo8mlJBxJF

Bjq651IpEMc0smSbdy8Nif1C5bn+A9I2QFlkpnvEeGH9uyy+1MSWH/Ozi6/0+ca+QaN+SK3ohBfxOndK

ldZ/Ztx3qkgQ1FCdQIyC8H8iq3m4AkqN9Ji/SI4lfz
bw+sjlEsbKdatk8+Eb2XccPh6O9OmpMqAakgvv6ifX1VdaQ+6nqefd+dkFJ/6Mz77emgGRF9iWN23zG0

wuDvZ/0bm3/Wpyruz/ZqmoE69P7zZbMGLAm/dNdJg0WokM+ZmpPRo63BUoZQKtJJ89+BCagApUCU3sRj

MkHeCOTd/to/gQZeg0nU5MqtaCXCAU9tIrx+1wBV8m
kExPLj9alWqiwh86+XKbXJOX6rIOQ4XKP04kTb5MGocbRaWtsMk7lfTfD++Fo3NksGdgBpvrbJ78LNdI

v4jENd6ykemJA+D6DQTL4Kokg8sKvMLTxo+ERIvL3KQjGsFrfiQhH4irSKRR6pZHVLtnffR3ngEO3Tu+

wFJf8QX/3Megk7MScmmvoZWL3ow8CQKxVdTTxUE53v
cT4CBragjGU80qJWtwe5bhARDepF4Jk8ufHodC2PMI11o2XZ3/vvOBfX5IQveu2eNccHOOrhQvT/rV1V

V3a8ZIQ/vZ0adt7J5C+7YnM9BFoN/6UDlR1jbus9R8rTyTv5HcC5vKa4vDF7oy2M8xolAdnKqye6ckLn

OBXyX0JBuHfqyR/UL06tR2Bdc3tBxs/q/1x698sUFK
d/WFLIIaxCmC4XCN/8/pIvAvSWr/fB2GQ4Ra1T91g+gWfPOvuyct2SYzIZ6qwVGb5mTtb2Acd8e0FgCO

sAWZddM3g3BwZTFQ5dwVt5oOchgbN+/zyxS26kH2x/Cx/O4Ml4zgYqqDOP69HcTj5PwypXdtKMYwZA2b

F865IYnUQ+/f38rthG3ZAWk9TgLQLy+8+tP3hkSzD5
VL/6Ivq8icFhLgZOQpaT/rN2q7lF+s2ch03/CeMIt0Z/Vjf/3Rp5F8ea/169n3xPm2Gu/Av6L+i/oP+C

/gv6/2fo/0fwki53G/5UsQqxz8ptuQCEji4B2QD5kGQIIXj2T3/5d1BzlwzV6TArezNZfsLmnVnC+sdF

pzdbhvCyA/G73O7E7/WjHOsaGjN1/Q+W4dtDCi4+JOSE GUADALUPE
Susana/ugRMSlnNKPA6PlIdIKQmYjs14DsyBlVoXKNyjFdKzGbCJnmw9qzN5DLgB2iaDXRce8+8zNt2ZbQP

8/1MJGMRK4zpDSYgHYTysUH8JbVhttEyeUwe9QxSk6C5fdMofz3jGZGwnUjh3lKjV/iGwuWyg0Psb4v1

AxbV1FVxRc6efjmftRk7+/w1Hu6lWnpfIuYS2FHNfN
9NhW0PxlxGHMje3D2FzzRz+ATi4Qa3PazfNPqPsQhhJt9wpdQbqv8W9A7wX2Tce/ld5gBGFaFaqMWpKj

yCYyjhq4sKYdU7RBYthaQvE2wB2Bs0JpbW/K9TAf4nCOCFnr/haeVLEuRLLX0GrZ/FLafua17er1j+6c

IdDLxsu5gNwC8Mjm9PFRrJUOXkKjlxhyh/8eUxo5w6
/GkANbEvFqDwYFc2Dpjy/6q8W6bpLTc1T7sa5ZyLzWCG6oCuwxKB3/gwqwAMkyY41/4/nL3AIAKwXkQs

FbjK/KKVDQ0RodFyfUUlfz4ezNkp2xfwaP4HgHyCW0FFDAIppkxACeuhZCvsg/uFWbsFI+I9SKeZwqDA

azlqSULFb2R5QBDZLx8pkn1cvwqafv+7ou4yT7BvHn
bfJQ/r2ZcMpM7XKtwCICrWEs5a9wl/7DxbrhNddJe64zODW59+xjutZ75igFFDXS3igeL1LbmsjkBD6Y

Q1b0jtpGO9/7Tw1kFqZZcrcznlbDwGoXFoTumWWa3ec83/bvox66Z4mS6a8Lc9GKntQl67FK/xIuLbjh

y/5xxRsRzcZiZOeCgQSXA2aJYqnNHqM5Q1EDjKxdqB
eIeAeBGUrXU6l2Q7IfNGKFQp+7pTDbZpNe9yWJ7gji99fXUlej5q0OdZGCi6Wwkb1sl2/9gRiEImbEaF

jo20NULSy592q7LNzA37uE9lU64tNPgaSz0BBmPgX+Bc3t3lKdo8ggK3FXT5XGT8ZZ4NI0A94BpyLYMg

Z362a8SRPE7ngeLS3cpqW/XZ62m/oWQJtYKjw1m0iN
GL+RRxED5FSq331DmzQVhAckXyzR9nY22gqvyzVP2sGiOyMmcqw0R27r0zl88qUdRuSHi863fXEMs37E

d1ddwq0RWAWzBypV6J1hle1DNlUwru24wVF7di1W7aW/h1EKeVljjrrJg13r9kgMoFbfoCf+Ndiro5BR

7MT9DME9AuCOEAq4lRRQBAzQsByU3Qg48ALPeapYOO
B78n9QSYLNA5uu7+yb1YTEoDJTNo2EcrBC9YefH0+rkQxhnXSxBQq8Frt4E5h8Q2uRbD1Z5TpEDImZ6v

zYUMAw7YDpU2u16Gql6Z3Xp8OuRtATX2msVKbeVYhAVAbf6iOiDCLEovL2WXEHsmGsMGazbyftxmYZaF

cy9fXx+o66e9M0leJsOffLalywV7ibSNE4oJ+JVJ0I
xhLvBD1Uu/a/oxUbdxo4rjwMzUtnCENO3z/BlLXKLgEdtqD1MaVHIGOI9j4lzpB91u0NzMXOyaMKZMpR

odZIGO+3iTTX6XolzjccV1JnQhRWaskJR1Hv3N10mtVdRda4wwU3tFsvkzihaD0B+wUOARm7dq5LgdFb

J9xxHj2cInzdYYETtzCtErvgEWEoPvw0NsQhgi9KfV
myWTrEh23U42MqZ3hzpZGRjzbz9HwuTBUdVqSYDhOaGzCdBHEnFQ0xaYWRwh25XmlL7gmjB3rVcDmYpL

hgFblfY9tlFVL3fU2R2cjtyd3qCyMhyazcT2iMO3GcpByPSHvMpjgepoW64bEqVLOr5u2hTg+OaqVwLU

AD6B/tLX6s+BcReQ035MmF+G+YhiVjrM9Bmc7opSuN
jE2DDNjG3I6AIuSkKidtqPKPrh6M4eFJYmPBXMJjjsioStRWnUXNRTmIHcpPZRg9lskgpEAEpTUqMoPm

IuieyGh7sTlrYuXMovMa0fXLU2F1DoRugqUzaZmcmLqgQMXNBkw/CpbrhUZ5BogNQ0yA/rATZWSq6ws9

o+2Aig/X28XfPUJmatUaDnUx6xekzce+MIbLsxwOBO
iTIqdf7oi1OzpdYYWAN9SGwv7+mSLWYOGJFCf304jDF85Zm07lY7J6YIPH+reJA1Jo5+MatxHbYO5nbY

kgG2sx5021F0lf6cyl15sxAIm8bGJ46L0eEno5YZa4X581C+R1a6t7tQ/y6vGuCRV0OhUs0/W1EIcAB+

eYJIW1K5Ctk48E1OgniI+F28mShkfLMpxmi7dxtrKL
qUVr5/uJ4b5URPFiNe6GzN41UXaveWtGiCgvYXPsGAMzco1nhaespRoB79b+1TsECDiwyiVlZ/bsEU2d

+BPWHq4i7UwYULFDcCtxjZS2L6SK7RsG9VQBFAi2DmQbMYCL3ULB+eM2myehdo/RcdxFSeFWr77SsZKC

Cqfxc01g1CZFdSVtGZQKlPVnGX1dR4wbAk7tJgqEcz
+02FwoXMTz9gH1yBTpDz5xP7vXQoglKukhbe6dnd+jahR3ZBy8qODbCaIzOOykLe5itN7diDSggO7Z0G

ezJWmuFO7p+tw4dO+yJzVApHJ7VEW1/7cV6k7cNzepoM+vzvHSTXlIFbtCCFCl/WW+BRtXbviuPGu/f6

HcnpVdIUFgQlqEdMpwptqH/DSrIvSU73mRmn5Id1P6
uKszTKudRbhILbhF0BMGGPNqOwDllzZ6X2hx4/CXe1ksYl+qlSyifUMfNBrWk6iebx6uBv85GbjIy/+t

O6J2VQEQkKG3H7Vz6k41PrWRgnujA3QIMzhacS64GleXcghXiFoRVO91CZ1Z4Gq3Czbv6YJYa7tELuZ0

YWD6NZUYku8uu1Esh62U+PWcq1/WqW9iLj7Ryjr/oz
mDMsBeZHi4M1rb5NXS1WEtB3dthnhftt2nRDDT2SENRX9MWV+4NnwtmBzwP6sHx2OnajWqwbvle4hECy

OwlgXG06D9TokW/4oJZAi2F7RkVxp0KoSoUYf9/DTLw49/qg3ndhLPvo0d0wKBxnM/RO+tbGMISWfEaT

GXqcRtyTs8y0C2lLjbBF8a/pbz0x5XRcvE1Bh14+Nw
RhgdbM4O+g6qo0rp80tMGM21ZUwysNRF8EAAJ5hDtoe5ifkJuveAekPrL/4HE+bYW971u5ArvuVjz3Z0

OCYc7OH9mhkO/zWibqTpZtwr8x9cv4xd1bka38Kxsz4nLek5eFysq4moc01nnvcwJcQ6q2oYktdkrRy+

zvvk6Op3i+OTtwq/JTkk3s/MLOU5MOtrOo52Tin904
N8hnn9v7fzQZVxlZ6IvvNCsLBHD7NpHGfyE8Ak/dNafhgnOQkbLTbZgHE83wUIpdZ5SHdgpZ4kbqByfM

URC7erLjlu5RMe+q1sZTvh5fu7Miv7R8CLE11qs4LcwYkdsa1MXhIU5kE/Coon+xWxCo4Kz4K9Zx7AKZOB

neygjwwrQDIGn5TYA0mllgFMYvQlEgb189ZB6WiFhK
BMDO60no02cRP0fdLsu+cyrs9Et+JEjgLGdUydGsDZRAvb6E7ChusgTN1XGuHH3nv90oUfL6/dPvn8vh

uK4ih6ApgzDoV4/vJBDpHiRNQVGUuaYHjZfJDWD8frQ7n7epiNUl/iPfcE47M4vhv3grOpG1XXApk0/w

d1T+T44xpX2gZ0q+Q8ixkEbaJph3BouhFzWwBjnJRR
7BOQ5KsFPrL0H6l184/jyKIc7zSZIiLU0DsFm3f+Eo+Qf4cT84fwxNiEYaMlzYM1B9lgP8sKJ8NIpui7

aGFNUDBadDnkdnH0VzohqenRv9BaJOVFwIa+bJXNKJzDE32bE526KkimwJjCWLPXbmV55HJ5932W6XsP

aH1Acr0ypzINskxfFm8VeFgOi95wwB75mEEIzbl7Gp
HMKwYlfyyV2XIUYEOa18EuPjIo6kojycxXIqeuz9FddEJ5plSvT4SelTMxG4vN+fLO1m0w8clpw0oZrj

w2dBE6Sb2YtonVxqvpZ7xcHmAInhp1yR/Yd4XuZM0szS2Hx/M6JyeO8CmrQpVwRU7Rwv38AdxAeM2dKD

jspDZ3vdiq3nXhiJz759tEB833Mel3w8bk0sBvp9mW
L2tR6t8XyE6UhNigyZrF9pR0kcPUn+sqN3lw1fW21d9dUXCIsq1l3tepkZgRiqNts+q/T9svUxOkEVPO

3oahDi7pMJI6YyMQ9emgVpILuiPSUs385XcI8WJl7njq2CHkUIS+yLBjGEbNk2D98a4Lc5tiOrd1ajpt

MmhLy7G2n3FAjrgpsl/S7870KxQ10+iJEfpYYPWBf7
dIXugdpkBbW0GMkFYVgnOr7htJkl79jX2m6yKs9ogYR1r0jQM7J+6K4y6fhvoOvewiXFXkGsxLZt+qJw

0/rZcZwgUT+ObhlpHpphnidzrDhkqC4ySr1jXQidACdxiiXNjng/4xrYVEsOg2ycTZNqbjiDSpXuGgr5

39ozSqGpnvJnDkdkjiVrDyoDM4XSDiyMHLwREOGwLO
hmMjU1VCf7XGBXC0iHO7CyAXejN1CZbq7bBH+rAgn6DRgcQ/1mKU3FEo8/S6l1LC5pGnFo6K8ZGhxphQ

WlVH93/5zAEm6YSE3xYH8xjdT3PH5QTHmtMesqKC/dPktXEW6aYsprhv39o7xLX+0L5uzk8EjdRKigqa

2o3ENcxRqzocjzvaq9/NFP64sMMUts+EOkeUndvEzp
68LM9nDCz1A8au6NYj/4KIuSNDXlq/onPr66lpfMZWMAgCiFByaMj6uUg9tqmtsxh5DxZ6huV3mVQ27/

6D7FJ2fp5V/GNeYWLJngmvmpW3VVC39iTrKZ+X2KuYH9dexpXNPpVegr7wgbnNZkmgmr941o4YH4zvZV

ox/4oKpiMDCpbTo0fG+DqmdhkVnHJrX/nnIz3xqDVP
at5g+IpsO84W2ow9OiI/3J9iebXctLdyec6/jHLs9PLs+wEpoChgkq7Lb7LgS0fh5at6xvMmA+j56bW+

kitoMQc9Rfb+ADB6BDgZRUZ28dzU6+f4lwlhCBh4/XQeUMoKwsDO2r3KUeAjFqhEjZiki6hlmpQfZngb

LR9s/5L2Mao2Zo7l8Tmp4oENfaCPpdNRSecaXbgd30
cUtaiPnnhCAvVOIg54eS2MUJ18sAqNKX6heX0naGek/NLuh6B48631DW4G4JL/HzyYt0Rh7QUqY3n70p

8kDOy6COxG4vyCdrjkGA9lcsQGScdVQijibF5lrhkq+SQ5xVBZ1Dec90VayIlx/gUXVl+n0IQ6FX8WzE

TEMu0prwCdiLEfTw66+0XJSKtZA0eLbPTEzAwo/lrA
I2xsngwmsxktAtX0+kT8UUY2ZO7zhNRBx0bmp4xEUb8HHY/LU986mnbullrPYq30i2Vh9we8c9NCDQL9

3aF8shXUrSMx++8R8F4XgyzNqYU060FFmI4gJW3QkzGZG4Ygp2aBFuELewys27GWhZbB4aPBp5ymfFL+

VZFyk0kBYh1DTlfQBuq86Cgr4HCVayvu+6VVyAssAm
ctp4JVhtGtrYebUElX9tv3+CYWcQ2lyDddLQ4ZAkjsG94UkuzeClZo+nD4xV9bYloo88YD8DNJa+R4RG

TkZJSXmTgjsVKbHirswXfsZmdd9I9+3awmwLIbh9zDq0rNniLXOQzLejxGQZozy7JMWzhltBQvUnhECq

K5Wr3qgAYKrMqdp/cEsq+fKo1Tr0mX6mzW8X1O5a1i
DLpNrCtxbIMR1+Q53fUj3LG1RujzO+0qBx2vs2KY6bfLVhxhV8WSRU0Yy8DfSCmJu1Tt5MTwPVPnV00x

3sVDZKkMqqbnMGLj1MLkRJjTRRMq8y0bPy/wOqoD4V1nP63OvlOyR8fQXOgt/9j3nOJY/N00ppFMqna+

spZst068RKQc8hvZiPCz1Si6ooitqzxS7VGMs+tnib
RK4eXG05F6I8WNXqjbFN3AlPu/F85Bi84TC+X/t6cL5H8y8LxlyTOTJgGctQN2faGq6yNErFDkym9TyQ

rMjfnBAVm5YWAxD5LtHOWML3ZtOUgcRWwlCYdIRj+s6SjYEOHKbnraKA8TmBYmR+aBQTOdbhvEzEvdF5

EelEZdxPxOunzJHcU5wzvXi0/1TssTaJn8Nvu8lLyA
tbf0c+agclV6hufgDA44fqnAoPnRy81EljGuqoQQOdbrb+dP5h7SVn8nk3uv0p/1uMjRA+1YpwB4wgrE

J1Y8rN1j2mBPdp5q+aXk9GfhxO+0P/1NWhoczA6F4rg2WSMDPkpaUwrENykvdOBvRSTO5d8OzQQ68Mev

dISOS5rGHsOnZhB+9uNON95jXeKYrhNeXUNyaE5R9U
BLGzhCgMmBUTmvyWXLETqnINdJtZhZVjxlrp8fremSfzo8kNtuE/TzK7Fq2IdQNIEdngRAG3fRoFGAit

AtIcW97vyyXjuqd48KtadqThsf7QwyynvdD+feyakVlFIXUO8Kgg6VbVJku5jPBUV4+wp6Mo/XDrOyzG

Xt4nG47bziFmiCdDoF78funEB4p/npaH7UuU+1TkGn
CjaiXZSI+0YM3wIZRfzWcl3QPac39uBOvDaipCrkOkJ1tjkHbXgKxiN4h35vWog0N91ZyHIXzWPele4t

RndXeCIO/hcZQdUOnEhfDBnXY8H0g+Sfc6SXjMjp64swRv+4Ud79/XmIsx3nl6P7R9zfbwUD/idLl3iz

U6bA08W5pl3WtGKpRwAPTsV2xtLjcDb2bWf7PreiIZ
bCWPZS2Gt3gGeipKto0Z8HPTMRwRG0e3K3lVDctPy7G0T2ZSR4IlnW/hVlMsP7Ud4ENG3CvQAupfSB+p

KtWIo9lOOKTlBxzE3Xx6+CU0Sxa8nVYeOADfX06Fp4DhWFQUga+R4AsDU4e1n8giaea1b7Yf0shI82me

Sc4R2RAdI8MeOXTrGGJX/xr/d2dmfcC6jfhoEaIda6
XfxIIHOUU4ljUx6bFXwkCaYOE4kBem0Wj1O379HefGfhLfCsb2nSB0NoI6mK8I7M68O88WZyCfx1QI5l

qmW5vJylqk30S9Df5WhP23/Evnj4VhEkkK13ADBH946a26yczzO0BPAGV6N0g509lEQ8c0U0T40vQqFz

OHjxLfpsdRNpEDoFRCus2s0DJmKmTHjlldXS4tj9rv
lrHQe7gsX1o3hBIXFKTdUOoBp64qzGEsZLzhx6L5iE9mL4Fj9wxNi9Lun/ql6Cf0r2g9QxsIHppULC/o

skQ0nqYUr7v+cx6yn5qjZpVp34qTfDiG+UzCOdJr7uKiDii/RflnTarhpU6QV+6mGJbxynICTaK9YRgo

lUpLIc0QggWhRBoYfjT8ywYqTrOgkeiHdxYfrn/iHn
cCY57xBYD22GQVSPzWpqJkfB3D/ufC//mn5d9/SqnUw7NC2HHpGnLtX1m/UVcBI//0jpT8t15/MZn4j5

PzJrKNq/m/JsH/TgKY/ov6L+q/qP+i/ov6L+r/IOp/998C8ixqsJDZo3AMahrwM0KxrUCJkbClXFHnTC

Si1Sq/huPOhuF3I0wH3iiUWwy6C21NFsKfw9YdG3LB
Jux7g42Wa0gqN9/brZJHVspGZstGj78sZzVPQoJJH072kFm8spoYuPeGwfAQbDEFq9lNtZYOzKViXBTQ

Uft+dFLfw+OFdGpGkzb0ZF5kzTrP2OtJkl4lurIAYFil2mK5f1gHX2Fza8JGZ4s2DRt4VC8+ZYWlmO83

VfJGpQEHiDzKIvGwTQT05K+5MzugEAS3fI5uOdMMmA
YWuZqt5ytwbEy/DV+cBsr2tRknPduuqviEq5GJUXRSNRHzTk6TWo99fC2vCJsuKJ9plcP3zqx/FL8ojD

xOioYAlJDN4MbkUVjEhMljn7t4boePJLNxgq70XTzyIf0tWnasdcuS2DeLB678cNejQqTOvg/SJ54CaZ

XufwXTUsF7EAf7eyTuF2P4AcdMCGeJn4se2dVPiUz1
slFV+jvdZTA3NP04cnTzS2KFkXps9/ugxcfcAOg3nYm09OKscoEyS17DytFhCLBM61fBVDGdQhU1XhFH

0ajbPe+TVPxdgBNqkns6p+neeVFJPy793O5SMCOGU0Dc4MncsmTL8Ck6hIQZaiIBEgz9LwvrrAvM5362

clDxHCVHG67/jIw/W6J9cTHkkUs8rwgchMTwgjuTlZ
Z1C8BtYNYWDLtml3J22iRglAVxLK7c0pcbpa4brc+x8L8mb8R3H9AmS2xpTI7FzuH0WHJxUoZ0x7nsHK

ykShyLmt1K5BCRlLqXY+3658U30t2TnKVjDREkLFeHC5onZLiAjBUQMworptajmYI3KIKtJUas+Bcq2s

hoiM9M2sXXzjATaPAZhKHDPws3u0CJC4lfp2Uxir2n
pmU5BVZEK5BtSTwaVTRvdv1qxSQw48uvUH+10i/YEUtL1jtJAj2DhlBkWz8bwEfZ8W396C1aoWQ2kAGD

Gl5DJmjZ53AzSHG46Np/ex6bazq8L9/g09DSn6NQIO2RYbtFTv8TLnHVLYKBZVfnk6JvhqwU0Icd7cIY

F18bMZSWZAh5acTfEu/I4bbWAOALjPLm8Y4ReXT7ud
K4JI6QtJhTlFcgLdP5U5OzxIqNbip1Z5kUflX38wlj0zxls+Vwmv52jqTB9Zt8Zuii/qeEUud6HYkbea

4ae1/ScQ+gG6ytPukk1c/Rg6E/rePEwhNZdmEdaWu5ev3kyIj3Ka43DelwevHo3pjVEDBeiz5SO9xt9m

XvHh2/KIBgFtNCjSJIz9a5acCwHOJ0c229gs/s3P2E
P39xRplv+bVbs6hWEuivgquJ8lZLet/fJKfaAfQoMWTTS8IWzpQXNOWokknwjCw8xsL5jyYXE6Xuih3C

icZEt4a43CbcRqRYrl2uSg+FhIwMLi4hAkXdU8uTborU9R4+gaZvtMEVpay1uewgn+fnLafkgHCwVNBW

vhXDBlOChESIvBBzYiQsd8J6LQkq7GM+BkdsUjcjq1
BLUCnn4ZROGhaocYen9fWSBBv2BX3mHQ8l2bBTaWXdCo5AsEBZMn3z5iNmL8nXQIRdvp0ZJAgbK6WCKI

xhJdvdxpgfv6Fv6UB4x8NJrnKi/P6zM3XVRv2L34aVELk/oU96cDozVisVntiUivonpPc0iNEr7WmhGI

rTsL8AkAyPAH8J+DuUU+Gt+PSQK7Lo3zV8KNipup4L
PFkK1lELqnsw+8CUlPSHEUK6LrH3pNb092vvCEGEf3PH2bR6PgkuovgHFiikbuXYoBMWOIPLyZ/ow94t

XOJxJvDcdKoUqtdXcm+3owuxfwj7KG+WVfqrwyyk2RDBZFDi2HMwr7e5u21L4JPiKRQkQ/GVOXJLNZd7

xKC9ap7uOpqWmJTbeBG5KdykTPjsUtIZhj9f5Rl6S0
afdLJJVyGzh21Lt0pCaub6BB5ULdA2TZqYkqKgzmo8qRTX+T3k6W/QlUGT5PvlyN7GOGAnPnzVDuquPD

Iuw0CShbemG0VrB9R/5StDgKooYFe+nQgjtTE7KJ07Xz1FE+mlZqmLxQRy2fIFrwOgQMFgYDu7vMNdKW

R1aimx9HQch4e3PfX+lOCdljz00n+EI4wWm+NWXKvO
GLVZ+c4TWO0f3vrLJpzn7RMs67ODwD+KALLpHLDlgkhZC/b1VPika15K7qXnF3am3RxeaDxZHui9N49u

GaJ2qejDhCY4ER8+iQlbbXKFZZBFBtF/3qFTwqyjZ6bOqJGQVKzNpwt2dNHrB7DUUiWxnZEXm+zbgJTx

47q+p0ykAazte3PgZ3ci0oV+7QS0c6jUbg/yrKDFE1
lcZD2MKwS8WlQdhR/gK5V9ex5qHxzfW131/yZ0z6EoCXOncvXjY8rhSBb4LhWsMSX/3yBJAyqZt7QX4J

NIcuk+8kma98u/YwRXt3LiSlLDMDAgsHsus4Fz/lo2zrqXqzfu8M1ymo7645UvzGw4sHbCP/5YNhC9FE

E/b7o+IEPn8WQ0fGE6Ivap8zwD2LRcjYxLtGABM5OS
+TprxR9T5nfGToDU7fYgXWa8rtMYf1pt3uHCaJNKPJZ1suX5BjkYbI4B17A8r1tyHA3Z8j9JMSjRsCf1

QWv31cVXWadMjXAZIzmhxWlADqG4LSLelAW4d03K0Vj4nMalTeYVj361b+kw0oYV1F0UpnXuqlwzQu/v

iRsARQ93GUEuWrQ/OEtXnxNngWeuRj7y918v3H9tgS
oTbpA43leVT9MfL+d7qc4bJlCmTVkuw36+XSkFik/s80d6xTu7ejL0XH7vZPkCjhJW04SKOGCVvlZ6Oj

gXFwJoN2LaXmoa0dX7Udk81Olis0Ff5mqlUrmoCUi7BVtH1LIye17ZX3gioruhPUSFY79MLBp2Vlu4us

rGNBrTdERsAt/2LzcbaL9Qyl7rWlSX0iqCvfmYnUYz
gSsmMuQL/UdLfteAEgqgkos+KScfGx0vUhdrWfUr+YtaHrTiAFW0gdIbbEUvanM7kt5mqkW4xa9vYtLw

Vk2VKn8Mgr/noPFrJlmlu4+AGWwHnvfTYznEkNxi00iB2Kzr8PmBxS2yUDWmZ5WkcctYr6q2KXw5LENp

vuc4W9Vm7pUWiXO+ujAi0RgiR02DFhHGkMJj4L4ZeT
S5+zjHdt8SoYdpcO0LuBBkn4gKjZ4KWclGDgkg/LztHlQKPjVxQCt8DrKkoKPnsuSoiGPNKiHZhXdia0

hIsQMj7wkLfUAux+y2tY75lcwky90D03zDjfrWEbkYPbsytukEoaW4l35HKOh61YrEWUa9FbL1/XesnN

B3YSnrJJOCQ7f8hM4fQvZ3DbPgu3zurp8K/WcxPbF2
BYgJlF9CD6r0psJjR2+ynwz7gxPAYRQfLBOu7Kyzodz1MS9Qv7J92hp06FnNGpGUSlLOi23jvr5Vvy/+

eFlIW4DfjF/vJdoTZlLhUAVrQ6A74YkEaAd5m5EsQI5Fz7iTOlFg7mz1fcS6Wmz2Wbm6JYQjWFPbWLew

8rZJqit30Jud+JZ+Z6hbPP0XpulQ6f4Jv0VSDfq8rb
4xkVxbFIugLPzTNCo3O5tfywEGm1CcU2svIEsfVYFgYETfzG1SXUrB7KfXxfko4yHv5o2t2c0UpGTaVU

WZsS7BuLj2l7OG4X+t3tQwezukwAcIFjL6GYt7K/aKfzCGu13J+A7s2sAc1LPqJMhJqKytY01H4KUQjT

urYwc9JIdle0kshid8yoL44vHcdB5p3YHujKv/AKsw
lwei+3Lt9LDnJ31+XQZFzwdTKwBzCGqTVSdjscsUU+3cCwMouo2NyNDmKXmpY2LSRRkEMGKNmYWNYCS5

Np/Jh2cnv6FBTY74cckex/GORJqy+qhXglO2ZRUBirqmbHhfkl8ejPnH3sUN+7l1zGBjNgV1OxBY2Hbu

DygVwsqPWrxme1pvPinMynArt3ChBJCcAqDXUBgzVX
0Z3gTpKGO6PNZnDxzQIGoVnkD06Z4y46bKBAiGVoVADCqBNUoI+GHMZKv1O4q3I+Z8ZINjT2ZHOeCkvt

3Eea98mXrsQhgoDzr387DLCgqK/Oa5wIxVbz9w3ZU4pVkwRYV2aTez/Em03pCBXwDZaVrVeJ/EQu316U

WMQccPvQ1lrh5k1YcUzHa0PHys5e6ENQuPD44ssgeW
Tu5V4KnmQV2cHmYmIbOO0z3uuih2HYlNrqFdrOQd6TlkSHaMdLENXH2H293goErEeoW5b7N7lgWf270u

JeNPiRhzLT2kW8h1rue7OIj89gLLJY52iJp70gKwMlXBSBONsRIfAFFz2qLiOT7IcQa+h1n4kL5c50n/

cP170gz1kkrWmoGwcW71Q6enopAowl9VT2BEdG6PjL
921425wng5y5+uvWc6o9XH3XUwZ+RQaXibaxShALiDZU1M5dq4FCs3NJ51zppprLOrUlwj/vwhosj+Tg

AqOUgZ+vDpcdkQLXHfrl5Ndb7j0Jk265spwxAnmJs2qEnlEwjt7Aa3+hyfBoo7SY26W6G5ZqK2U1lSSL

LroYogfj4wworRujfKtyyZOu5jLKIrLusom2zOL5Hv
3sFTTxnNrIJHrF+QYnyPBgIrFtjensxGCSUxX8AeYWy2dJpo7D36Xxa1+TTNoi767xRjsvNxsIilHQXf

zs+hSVahdsBmaJmPqj3zsMeh3qssOVuGnsH8qGs1HS3Ni1oFvNIAgDqNEEyR1kAH1T0GV67lfx0Xphe3

YSnks/4i13FP6XrZ58Nhri2milJniZpK6CHzQRdQX4
iP1cVxUYGq3PcxC1kO796wnYHiCHXRYttYshfSVK4Pr3TW1JxvqJbF1NP28iPXEeqE3v+xGHhnYimAuf

sB4lUlcA0uroiZwSyrSLocu/icjPpnnVmBn6dwffLnnAIooBWWSgx+QdXB2m+F1lo2PLaAtVWBGiAEfu

v2YNvTF8Nt5iKu7RaP9yYqwUHAo4lkFE0FVzyOB9dk
oaKFhlbPZZCg26xth+RKg6SzHWJ5jc/Vt8OOuH++s3kmpDWsGnCuaulkBp8GH6OLCGd/hMUqdefZcdjt

bxVAa19gU7fYb3O0w//0po2eLh2k1mMAdMSIM59m03cFudD7gTJ718Wz5nxuC5KpUaV3s/3SIXLsbs0C

sfjLW4Y9oUIBZpsWW6I8Qb8UezRrtj3vwhTV/qFa91
cJucaXHku8K0hrKrRT5LCDw67Q7JdGwI/POYxLYELQhP+09Rqxq+1qAL7tJwOTNJlHkm38mdC3vmdLBb

/kieDA8/VZrTj3/tekfjL4LRR1k2nipoaiyncPC9Ah2ww3L41vqw/z7KFGU86qFur35qTaP2c3n7eYcm

H06RaLuFGFF8YvH9PrcC6/5stoFVsmyIDSkXJFKvYe
qKWvg5w7Ik8VLiDaIhKDAflI0iMA3FNfaaxRzsx2BM1ccalNCf6bBHdaK6KowO6o30jlCAZE+jAU++SK

fEwX6IkrmlDyC0/E8jrhEV3m1jcMh87GEpFY5aPBdhAsdlTUzxaGEm+1YvRg8GQA4DvDtvFXHWivuumS

mPuFCHdNqfM1fJAh4GnlhPBdqwiBH6cn0zdUxdoMWE
N1jmApgnPH7EtP/WZn7E6sgBIY8RKCw9r1IuVlbJU+lGloh20VzR4sauXmP0+AsiOLOYB0m45n99Ll6S

x3BBy9PiuxpvCgV2kF+D/loPVl0KNW/L4YcS31jpANkNTotnuRQ5m7AvMKuuwL8pfmpUjSMxbgAN8Z0r

oHCnz/xmmUQxMdlx/eAjSS935u8YVzERC/YdmX7Xz+
f0sSafaxHFAP3FB2wwURMwXest3SxB4wpIRe8Y8KJGuEmdDi24U/eJvGZE+60xj4KflbGxaJGbtgNi+R

iOhhPYR8+MByiyHd767Jofiz5Xkca55LROv8kp9L5bVlyzogMVCz5X8j2/bUyvWfYyDbqvNqI4KDKIJk

lcwPFTTfJjdJnI0i5XudyW6ENBhRmJQgEYYKT3X5/9
Cf/T2xF5GpO4vmqapXVtFwFHlZv/k8Ydzo1MQnX3ygGhIjgX5VEfpdI4p8KoQkwylJRLcKM90Z34HOhc

aS3E+vo/Gd3NzkKRFCFwvM8pOyWS2A0wta3v8TSZP6gdx0E56VrJxncm7p0SC9Ss6ePD2bbVg2kVxMwW

umy+U2M3QyMgk50c9TIhzuzlkPBQVt4Nk5lVBDpqM4
n8kFAfEAroZXfxziUHCy/+DR29mefFtKp6EzET8huH1QXewZhwRh1ka1VVsnyA+6u54dqvoTd9Trq22I

Z/nrc5ORKFmxbWTVd7ftOhu3Lx7TaftDiOxEeyDagOzIDgHbc4JPsVO2Q71AHM1p/riUVozq14tGwOob

ZMIQ3ZvMO+4qduE1RunfLUoe+0p++6iC0d/TBxb5cd
UxTiii3Uq3dMs8S5gbuAfQz6ysdMQ9D6qytyslzpWn1bFt3/O/i4y2xxDJw5R4f3LXvQ1Kng3OTrOeHn

7ZslKOv35TG58x/iI2WRsQr1pKyjWahcBodbkpqICTDEit+KbIv6IwbhMrS2vjY/ep9zRyKaw46mVDU1

973jqVXu/uqEEuMca05yBBsl+X+batvs4vrqTHeefR
C+UJIhJPVAtMzOm7xKuT2cOH6Tu3XQgSX5CZG4cjb8k1d5OqtWxcNfVRooXu7qER3isux1WqLX4N1kGg

3Umq2I4eJSbFcSmaoFyv3BQG6TtkWtLSapLdLje45O6io0PtDg6a+lT0Zr/8KYpv4OfsSl+hh21CndhB

Yi11rHMOVMaoNO8Hy8oRZuEjETm8iTgjMZ8OTJ6fbZ
oIpxCCoU9lH7GasZq29iUcZ4KvZfV9T2B7vR12mbfJj07b65i24px+hnOs6Edg9GDqyPMUV73A/fjZdZ

1DnpqR7pnmbI1VcZHbH6BVTXqiJUaMGCI8IvCpM7cZZAMg+WSxdL2rYWOAYc644KykYLDf3DjRu8mSMt

ZQ6IliSW92YVdAqlN+DSJtAbFnECbSnCg66huLSXTX
D24qjRtlAzmG5KCZhB3c6SNKgG0aDcprdzje7u6aQQSysI0XU2o3uDpGoNhnZXUiivqPyjaW4dxw7fEt

dLY0YsaXKHqz1Yi0nIhN1gWP3b8/9phsVFdLxSMDl0AuLTiy9AtL4AdlerVywaR59B6/l71OSHdyO3i7

1wHWU8uuSDavtpeHGkvAy+mL0cph4ptsKjGvS0AT0M
3f0ixRP/Qa58ido+jCS947W+l2nyreOgIDMMkQoOKZ17OLgQTs8QHK5J/POCX3Kr05eryGj0naf+DHTT

Spd6eQ2IaGl5/IpC8l1IHr9Tx1qNFtONFuopwF/4e22cvled9jhBgNOaw6YJchjTCzm1YwYx64ekTF0f

bDGv6PgZLENKxAcBMHJi7MYEB8q7O/i6onD/yySey5
Ht4S+3VQ5LR/xIbOChR5IUJlxillxgoetznElAhpvShaivxuemJgdQ0YZM1KOrVf4zDnk7d/nAZWrw1h

hu3R7GBAzlBROQxwvxZGUm9vdxyGa4NmZRdiD+gdt/pCk6fzXiVU/hACJQCdX3el4aw0ic+pLFqc1j7K

2VXlzZPHunJWFPdt+wie75wXue2wmNj0CneVd2uj5c
hqcw7mYLbn8uW3X0VomZxYBZi/i+qAU677CfI8DcBF0nRb/HMBOTC9xfI4jnZx1fSSjD2jNgSGTDWJgi

0BMvVzcVtbCvw3V6y3W1J8xedFfZj7l8cLIWLtcb+EC+T5BonTSK52SZrZ77bfJNKyZw+fWM7aEUbycZ

GL7c039zl0xs2BrERFgjp/U4/tSaVU/hpI64lkTx/r
mPOBZ6dvdFkUO6ozB3ObS5UqtdiYRq3aer7ReA4j3eHL8z6Tn+gkos8E0Ss0+DqfacZhyVhcbdJtp5K9

DSL6ppiSsoVId8yt8IfOooX4BPv8oI15qbvfmYYoaf83gw7JHwvm3a7/eh+z731kJZ1kAvp203khvo+7

S9HVVZtTa4CXxl10B5H+a+bj9fVToOf2v6lgxe3pos
n9ZzaBfEaToJwNB1ikWaZx6JalU9uF1D/zBjw4oGU7B8saOdW9Ls7ogcvRu7lRsPoZ76tWZvC1TmdCzA

4+Tug180eQ+PBNtEqOwV5QfvD/iJQzjhaXc+UOAiaIpxtj/KJatcFDFYoTcwA3KLXmsz1+2gOKF75xDc

R7EW7xcA2OIBPFRT+oELr/xKN8mbb82op9wg+HYdoD
UAkMuflvjT2yRUvEBE3zVVNlEMkIVgQui2dpe50iiik2Z+OA3gbbD1xW4mDVV/YdXFN+q+fbwYmFxaO1

UhD480KS52I29lON2haseQgEEPtx1g4qd6GjIx8UPOQ/khGE3JCDBJkkZncleBSMp7I2q3UM/hN7LdEI

YMJ1GcUpNd0Qq71ya13UJxF0wvXfKQ0vla/kYfZV/E
i65g23a9mS/WFvG3OCsV5hjaOCJg9pi/FlLSf+puqFNqxKw5gvHcvi0UDcOFfnkxly4U49CZyJoN8qAL

XOTo0ZdZbUoqWdcf+l8LVwcHJCY8ypQEi6QDeAvnThQEURTDs1HRqulBSI+cu3mA0h37VL7A4+8MU0Ql

H3hzpCy/43cMYRxLK/t7V1a4U1OhPcXYJfihmRLnMQ
njybGrbGnWduYY8e2gI0D1Fzc/xryREe+RpG/Y3dzINpt1c267G1+JpLJ3ebNxKLF79ubd35z3po9nQ7

txXvdJDhFzDsLmr/ILQ+X3zBYDKYJIx3cHow7AKwabXTJF9E+ta99NH+WegUevtVBx2pLZ7xjgzwT/VI

ZtDWVILRItQWlP/nWTMvd7pXMYRSPaYHXKXbQXihr5
BLAOpoEMsp+3TMT4l6EfUAEtkEYsBtzzMH+yGDz6pXQv18OGCyBYN+uViARrDBY0K9d/D6SCOGxvfKsO

ANg99n5AuAqcDDUqrCENIzihV5qGPhfhpIJyLJhlv50+fcXWqTnR9kKX1naJxIFe/hvXRZo4o6TCOvVl

FO/HzAMWaMKIyjxOxj1e0+MFBk3ve/ziqW0UqrDGdd
7y7n1U+aizqU9bW2MmatLlSH83dXnrX+5OqAyGKDbopZUhbEthb0WXTw4UvlYqfvulP6tN+cVCJXmwwn

si4NmABRKR3CODDxz3SZiTXTiVjwCJKMhHsoRkd4olaJs9Rl1MjLvqC5PwZsM/KgJpS6Llia7c4zoICt

xmjI6Nu+11eyRTRHTL0nZSm9ISaQ7vordvAc3fLukC
Ag0v2KOOM21RFfy0zT8LCn6g1oaGPJPK+e7baU0T3NbAAlnK3wvAJZud7OJ027QPaeIJ9xeiJ/fEe1Eh

+k/DX+HUiEidxIyDIvy+c2u/sDRTYPBblX00kXkTSHgr18dkkeTwdAiIuI3iRasUou6h9Q4wVl3Io+Ei

dATmrSnuaprGEpvT3CdL3rSHedfHAa2+z1pYNknmFK
AFuF3+r8cOP8pJ4Mkc+1P/VhmN6QLbMa5Zcq+GByoKaDJhmXdXsFrYkOe5xmaXvDxU0lvDhJaQr9Q3Kh

caZxK2nSrhBOIorVIi1Yp7ZvP5HJo6psdbWJ5M/K5WWZY3dT7yxpXBCdBx3KVwKeL7F/J7eR6Vw9eAH4

bNOPcvoH6PcooICXNfU3yA9bM8Tx44pcBQyVNHqIQW
DtO+1FJOekHNNefAXOkaehPsKDRfa4X+asokkrkGovjypAr2Ft/zcYLhyh5uOWxRU1cZjxcMCAfOySK1

sW38+Y/qngXO0qW/btn7uhjtt8pvGb6CSJiufegYmqLm5vTkL36ZV5q4rGXbz+pwYjO/4ZRrhDg0ejg5

2RWFnHMyRi4P0V47x/tWIQYvdj/+loalE0NqJMgqtr
am84ni3UVkt/6/MnemK7jH2ubZQDsHT4ia1uppo+Psd5ypGZBcwsrFfNZ9J/7jsnmX/LqpJ0u7vmkZXi

a7h4k5nt3OPBpr3xao+m/TQwFUGVRp0ZaK5l3Q1+bGhpcA42B6/4tozYCohIm0fAXmstiMWek/XS+oou

AY9DIJbEljw79uU1PonxgXfZPJSlbEYpS/Y95mOiDJ
xADFxPsV+bxTVyTz7PowJpWkQzy3V0x/MQerwoJW4EFmYTfX4rhRabpfqKLDRW8uGXKiPBzpesyyAk5h

uI6UNIgA/sZYoeV0puhwOiJQfURn8hvFCwtnt6sxv1XJsvsuTzLyFA/eZSROD8l7nTkWm7k0hZpNPiVl

NX4nbZ7Z4H0oyypJbEkaTwBJv1+u7znhdHXtssQs5A
brkBcoTb1cBpDX1SpTJ5NdmNT3JbKUf8RciEDEVu6MrXWffrbFlD1FH7Xzq9fWFR7+XE34/t1nBtUE+0

OtQVWOj6tzLN9o1PuNiQnDBKvBkG7NFluXqy4HwYUWucClArqHwy2nH6+KHgSXNVcZJbKKWOWK+QPn18

CN04zDt0sK9L3PABk4eUl2araaysO1ONf1Jf4TB7pz
Ri8HsEx0QlcdkDlj/yYK5qsDLn7CunC40O0d7gVBkvxgiLqIjbpt9nm/+QrRo6MtL+cH1Ca0Xa9Ug9Cd

VmXxTpcCw3fbhIzjlJURYXFLQBtvWNiRs6BkVfb3al8JE9HEkI5Wq6YDsTuBMF87kx9lIgGMWLM8VKEQ

5ZSv7h6Nxre/eyE75HqTksrDsRNHAC2QztNjMtA6k0
qkFOeaWJwdtbwwPs2owlorGY16l/+CaTnGH/iwnLF/VTI1mD0iJ9zUjwanFgfb+MiSlYk2Pz7Z+/7viR

Rw9xPD789HF93zmtdGxvHXMdun+wL+KV6okiy+CDLug3IjVUrou8J/IMf0LfJWVRgJaQyOBUUmChTLSv

zegxe3jZNIRjKmANVW+uv8El+5+GxXE+7rTbAVvvpS
+JijB5T2kI2aEj10pOie9wiG2DHt5IxPSAZdN9pdTVnqnHt+4ek7u4Tk2Mn4uSzsULLg7zBXHl+xWPRL

CiUXdll3RXgeznK2jC25XH6tiT/9RMyAggMv1K7Jkpr/AuArQNVGv3RQNLN3IScfuDR/B6SwxPGKH9IO

InlCKJVQ0tpPmmNVH+BDmJRVkBcNerVLKaz7zGJOA5
mMszkMtvE5YzoWhI01J8h8Rp0lH6Rlq4W4pBjwWVK6o/pAP/bY5UY75xRFwJp2gi3KwEtm1lOxzdB+2W

8sZEXTiVOfCk766Anze+pI2k1Q1kiKSQDBPmDsPCShDf/adXpoNiky/LckfSU9QC+CXQ7CBcqhUNiN1p

pcGPhacnjixxzsAmK6OP0mzaBalYt4/4jQG0vsYSrh
wa/Eo1DxPT7TmTBoB4oEXbPY0EDVNlYXRjZ6gmvFuUzKf2FMpaCZ256kMdv/XX7JUjwk48ryYkjwQI0D

LLM1u7eO52rdW4mxjdRsSDSipwpnoi0Ckc1Y5V5pSvn8+kzhluEgmE2amTC0K63qvQS/KgXy1oeMH26/

EQteWE37GT6NfHp6ZRxjNAz5jznI77ytLL9JXx9bQh
LuXsLssIbBhRz/PVWG8iuhDG1EUqwoca+nqHWnlTbTfZz4AkQyQv/gX5HBwJHTGrtBleKC1WxNau/uV+

9wIbg4g4mXueRPfyteUGzy8HVOFCIBb2/Ne4SKWuJTscNNP6ff6KcrcTaMqlcJs+BVC8umX0WnhyzWPX

DA9OXGR1mEf6fERJn2cgx4Geqd43iDlZCNml2baaVB
Gitt1WslMn4KeAg62AheNq+4ASf2Pb2Wk8Yh3Vw264hy4pL3c7uU/n49zfmoFvGm1bWO9y2LaLukgbgV

L0xm9uOBRxtTp0i8O8F/Qaezogj3qVdJHi/YdiFc4geE1E/93dF6Ql0b9d6VKeCit/8n+e17KBwGCiwf

bW5RR3MHaHHbcjWY1kgMQJHbOpLPn6hgiFYMvwy6cN
EFyDV+FehVaAQHPuua/50yu884j/bvcYb/T5MX+zXMoI3Lb+3/xN3uzkrKVO4WGxwoJcYrGHObRKyj+m

0PexwIpLTI5xRuXzpM2xwJ4JJ0uLw0Z6BqoMmKiQ0E3MtiZQD8BzEvgfBtnizOcG4qkX6D1zPMTJhHdk

l0O+QjL7lr+UB4l4KfgMDIDBcWi90wGggOFhIIkLvA
6A0XFVzQmWA4rJNg6HUugCRmg5fOLf9XEFxc4UL+04KGX8pKK9L/qU+7R/UIkJvxv7T/oDl+zyS7wE4K

EwhRRl1/7EP0D4bfvnirLHQ28QcOczKnMSt7CNj+Neaqk4lPVs4mZ0SYnJ68OvD5wZzwjFvCmjzctK9L

kNezEStGpj/ySZClDcAuo+2DOnJatRx0QtuHr12pg/
m+eldOrE1cZsveUQQtQJmLV6Q3N6KD5ANUd5S5kUWFLr855x+RIfaA4f/gFj99fpqoZ56tAQcrTEtrS0

gEmNybDA+ajd4Htl7oMzR9b3ViSCS0Yw/W0/fXvmCGVfjVMi51iD1iZOUi0QFi3oH+TyThSOdXk6onP/

YmgfXRpzbTUXlB9Mv/vI1J1R9fS/9/wUhKZvBsnff7
WNtZqZlL63Dv6t5AfDfEqljqxtqzzZsSiU5YQytiRIyozh6JJNt9ajSSsvNIbggVjJNQ+Ib6ZP8Of2k/

dy9bpgmOhMiCX2kCXXUMb+qeFhJAIa7Cxnw9t11ymZ4PUYsFzASsuH05i725yHr1dduJaJp9D2BbkcMK

ufuZt6SxjYgXVw6agah0DUto5jUoNqAHHeL4nEO05h
Sfw79ykg5WaT/+ecwDFpYy5iQsNZ6ZxOB/SnjiemOwWCaPianpjjdatOwOS9GGwNTffttKz7bpMwH8Zz

cZnNlIk+g70bXUN+cKcQxTK9EJxVYdbbBiLH8GZ379IdH6wFC0J4Hefa34+AX5LPtmYnuVIFmvMEoicu

HRySQltg8x4m90MhXWwH1iB+KIWOP8IPTDNPO9LiIF
apfaqlc43LIB6AjDdpLp3HXr1RBX+IIhyETgABY9G+0Odq9CO2/ko6JbR0GlhGQmhv1/IFVba1gHh5LD

FQeCWJYX3zcUiVAE/sv3vYj9+CzHPrcgil39mRbZwE1rKgMEKY5CFCkw1b8zWH8ju6h+5VkmzrqSdgRM

M0iA6r/91bgSb9dGFgiKgr0nim+kNOZ4x6atlq16qh
OOmZdcghf6JRKFnHlHizoCbrDtEkx1FdD4m+uTVP4W0SwbfGiaaD7/+qabOhvL7Ec11wV8uuzRW7kR0J

wf2ruwOFwpDBbeAyVmLThjtgcF4D4PgC12C0dTSbhdWTj8+p8W/cOhsBwTfyf0orjoJDI1SANG3lWShI

+wYAh6mhI9ihF8VM6kClXtfkN429TVLLm5Nunn6mak
fZzLlKTUfetWNSDZrKg74U1aDWHJ8K4I0VBAbWP5BM/77GsYAKWfsa/w70xYJ7KK7ywrPuAXCY7zy5Jr

zDNh+92+9dvuNx+boZ7IrB+1UllT9FhNAGSbxwKcs0tHaiHDSu851UQXoKt+RvQl32kLmK0T7tIdaeJy

IeCvbVpaDxIwiOX5cLwyVZnUfYvxRRDZ5vrsZIxRL2
wahPAVRKTO5hFyWetmaAajQszeZyet4oi9214f5eHxmt2Bf/oLdLnvIlEK1QeodzaDIo1SdX0ZFotGuY

owHOiGj9n0+KOT3E2j5mZn5LaPiugqRhsuVwzBC2ia99PnmE2gc5aORWjRL4MdX5J7eG7ke8nmiBKmKo

Z30CysjB8YZr/opTg94Bl6I8hGQrSt9clNOhIDyyIL
c0EXCkjPm5V6iZC1TUIAEDyaeLW+4navzck2KXSyknSQfyT+dG2f/O/PpOkMW2lzGpA5mRHqcCLRlUAQ

3Evd6lqYtaszUhl+CPU5HnI8ukhUJs2Ehjage5OgiOo1+BuY6OG70G1wS5+NwMzP+WK9/Y+ipt4YLA4V

BzZ8/Y7kp10GUDGccqWDzF1kgjUgJ79xWR+8ZhNond
NptXKBw/XjyXr9MOWh7IH6qgVk13dLTmKG8NzC/SrwFB/NENI56V7EM5t4+FlZLS1Y9Id5XkDE8uZvOR

1VmcniWiSIHy9BS8A723XSN72v5to0YKi29T7h+r9ijb9j+2Iz47AN4OrVXuowHCCyWKqtN7AQSDmIP4

zGsrh2/aa97gY3TB0MwoEqXPSfcwSUkRFubLFIouAi
vnxCvETsG2Ppe/oKDAnUpzowVu81TBSHkVcQnuS5BbkiBBdM7EHvm99n2Z1a4w2tXsIUvQKn2fRu/zE4

kW8byYv00S1Pp5g92CD+pe9f5M/jr18BbG1+uwAOE9duuT3Wef3dfVkL8/4KFNzfpqc0m9Wlxm6hXODw

06j04MzOa9vq64+f07miPD5+FRZKzD0hHUoL82HRXo
d+oJqlOLdVlqR6ogH46iP280BYqXx2g+jrqACY566ydwZKovzTNmgq8gXykC+36k2bRlx4wywSTPqAUE

iRmt/xuOC/kvFLafYsDBzw4s8Azha6XSbo2R+fUxjRW6UTBZXVtgNJn3MqCO7/9Qo+SZwJApqs01RYe1

5TeuueOs+Zec91ffkV1Tt4RYaQlKhKoqNx0f/r2+Yf
OmOsJvmTkDkq+gHdIKwG/Af8f6mmm08wGNplYC+Xh6GNfUBg7mxLRdtQ4ourqrekruUMgLez72Ga5t0P

vW+HFj6tCvIKf7zkzTANwQqWWstnCFucvghl1mT3SqJ6Kxt/oLKCmoWeOsP43C6p4iRLQBwhWXn2kjHl

nyrT9PQM5WmCrAjdUncQp8Nqcsjouh66rXQ7EOIwAn
W1lzAFfWSb1Ym+Wpn+tj46nnoomGC3v2l+EKzXv+6Ml5RlZMAkvtlRSrYrVZM4C7KYl53pMdeEqp5WwM

iUE+9KMNFYQzO8w3DDxJSWDd58Wpy8lc4jyks1z6iHq7k1k1Zde8ZmrxtlP/1DzplKT0g1+JFvq6karw

uftP1VHc8b+yzQmuoKEat16ZmQZsN3Oo/MNWBhPdE4
t+cpFlypPQeZLnTYbw3f4+o0ord38p6AcKk7XoY6GS6KK9h9N+nBkXfXBsZP7eSn7eVde9XhV980BJPu

19g/YsnOEVHa+BH5a8JkR60DffCljtFTEgpv0O4hB5AS/HvBjTSiLjmjCk+7uxymN6y+OWRj/Nh+gRkF

CNf6rfQ83CO2sfjE6k/BGAJVSc1aG1QU7Tov3mUooY
7Ney4RhPACEugncr4YELzXw5CUcd/eF8bbEeZOwxsgIRkcDsNdaEuGAlq2byS8qxem8Ov+2kjLe+RVFh

qcwvq6CSwI3hHfu8XkCQ8tACin9GqtHp/Z8xQz+jAr6TnAdW/av64Xdq6Mf2ayFGZa+Dd532haQ6a++E

yPm3g8IENOq3QI420bfbDxUBS6zwi+9qJOp3foxt2D
I8ve94lhdCVoDWv9p9tf+By+84/Cd/dYvZY509MJin+30KYF+3SqLTxn7ANjCeLI3LAhq2FutUzDoK2F

Eq6A3HNBeopX6oPcGKA0nzJIwFAesNFdvRfn0xwx0//mRekPqmdO/xCbgHuKUN5kR5ENBuVse68oU2Ec

x42KPkGDsEBOuzUGSAB9HbqLQvWlsMFBdsJuuN6YeL
ORgH/fOnE/J6fzi913+z2Y1a3kIBu5VZBLbrMPUO0hO/NvcnnbsyQh7JtOKTRAdY/cZw1m/+nq7AZl78

nKy8vzva+Imu/j0M13/vLeDN/qkQI8Zc2nyP8+NmsZGzMuMd2XflYYnfcytJ9+OJ2AFrTITaOaVV4qIs

6eUUhI8WGrF8v+QaHqveX87xmVZ0Q7H/5wL9s/JA7x
YPv78r0b/3Kk1U975+//L7l9+//N4h5glhsATB7K3vQGLBdekSBbP8HzfHU6Lw4KT6fFkLVH9YEZlTfx

lcJKGg9h9Wozv6BBxD+JymNHCUE60abZV7aosQp/oCXr5IvlUhPQV74gZrNYWh6gu7vpF6pAdTAR7EJq

MdbdiIXLkSBLB0Bsj1w5N8RW3/ADpCNwA451aqIcZs
FzMWDQbIkXLRz9nSlLVJeBBF8Jh+Pi+C109IUHHXu3rC+o3d7N+xCLPEVSkZVHm7ypIlb+qw+aUTmYUn

7mjlBGxgSDKfdXDo6CxpLvvTr/oBR5mVt5tG0ElPu8xDdlcxzkMrqtrxH9anQueYuywQv9xzjOKU0u3Y

BZNyijinPXocy48oNsvDQWaeYMSzK6x9hv2hgVkHWa
wd63fbw8y2UFmm/NguRVoblFMpcEFwMhDcip3t8dknmhzMWyyjU+fE685DQut3Uss422OUAKsripEXX4

EGiRsy8uTib6q1fesmX7I9aFyHSQOv2LWEZYwuWTQgbNu84YVlS5NbigRJ0SQTKM3AXzZi64LKKx+WRl

sgmT9HRpnszaYksnmw2cOqmt2MMPYxw1jgZfSQEM4l
Ab4ZDLXVpqPEpm+1afyIuEyip12o2rjFI+rIeMHeZlMYbvL1ZH74CPSKpKdzQ9yNihizcqOuQCdZgjHI

Yuj2y0D3obMR+efjDZQ1gL72XhHzAFHnwDseVOyN/0xa3yoynLG9KmhnkgPBejisBkhxAI5ph/ypflp1

ykdzp4V4N75ZUzoGBfoWBz5jinejl2N0sJgcJFNJrQ
Zvnd9moI4UDdcycoUjipj1AQ2DZWCFS31+ck7/+7PRHZhuvFnR1+dIvPt9Z5R8ezXyPLl0cNxKII2P51

6JTT3F/l5dNl2WRJcS92HhG6ffArMW/mdQRhrcOTs3N8MkfrghXwQYF/XTUvwdIYU2beVWBolgy7d2FN

/TJyRwsV6nadzprux/XD/kG71JuN759hZJY1u+1qeD
jyaTmoJoMXZ8I8oupQNYmPFsSaqy+gl/Nt2/ueBJk7qCHVMepsyg3kqjc9vJe93jD0wdcnvvuwOarsLp

1mfW93Q3nTFsyJzF1ypEBAwNVMFsJOt7XoVpwdtvy6uX0CxZTzf/cJtiXHzso5vaD7OSB4fEpz1MJiGb

m2ahJcBcZa7xJhipxRrWewNDl833DHDFFfSK19bZU6
pY7hMv/Grl+cXW/OJKf5opLUTsT4KGf1+oT3omBvaJDwJBAhoSIjeS6yHQFGKej4fYcurPveZ+sqP1PU

N75dBmlQNnMhF/BsH+Qzd33ZiG3IAkSTafFIm5/d97EoMfQE7xaG8bcD54K3OSJhwUvzz7c9mISjWzMt

f0oLIsAigNM4Z5RtYoL43AtJhzPyUHXiqKQxsTTorz
ZtwMKyvGmmCJjm+tlpL/MBE39vNZvW/DecCDSm5RuJ5lrolXof8dFDoFepAdA+jELiOdaBbQPOnG6k/V

RYkJvUtJPbu3G382S1fbVM/MlhatVcqg68bwvZS7/JvnQ8FYhVUfEXjk0yQYNLpW9H8nUf13iTbfanrP

IYyVpHpf1CJhGTLOmeKMOZr/bR8Cicr8aqCwi7Q6SI
PI+G35HLBv8rw77jgdhH8qKmYw8jUZLVfXzrATilyrnRSCcu+B2GYkfWRJOmMmYbxD/256g41a0VD+bu

dl3o3yxi8adi2JMysnQOvXNEOzFDxJl9iArHbL2StrsuddCpN5xENM0mf3c7XeNpQ90LSabKYAIE6T5H

5Zg71CUBRDUMFjX79e6Uh/3lWb1HCrPiiFVDXCuREJ
7C8y/QFWZCT2uVRp/fe7z86Ztwk/DZvdVC4ImcL7l6iMvUVC+jpcSJbG1Hs0ANb2K3KS7kRpP2fVHooH

FVT92A2KJ58o8M5o36xYd2j5rSoXI4/ye+X1PdDm5nJXQWgE9msTithRurV/YwPyU0i64S0mHBuUGVNq

Vu9FuyG8n/lFmb3mwAp6Rt5OSy08CTCwxmrnRlBcUb
78cHEUoDXnjVKu07aFKtrXe/Brandon/T3OaZUaQGGrT5T+hfBwEiEvCptekqRjBTTMJP3jjfQd9MdnAu1Bx

zADXG7JcfinEOawx1pcj/ZqT/JbzD7gvXPfKfrW9uXaDoLFRkv5Ek2G/0pSq7vVLu3rK7LlbwKftTEHA

OppeXuJAPPI1r5rzSjDvMBw2VB13suMY3AaGFbiEtk
+jH1cgYrp4K8xkcekKHhN7WiuRkcJMZHc84P2J4zK1lhwcDAhC80/VLB18K4ZTHTz8seji88pzSgn9zo

G/1gHSfNsYVM5Reljxr/hMmwyja/4mDwnlyWeS+w9V1ZiUf5R2FuFiJMPm9TvDGaz573FrFWxmvUpwCi

qh6ivSfCwAo44omHF2uwFXOhK4SvWoUwN8o503aNLu
NZAU3iaAtyTmbH63IBVd2IG9fZoKMyY3e71do7rTicQstU/hsJHBMdFIZWazQEBkrS1hVXsYQ4yllU2s

QLZmOESk1t40emoINlZPfdm+chtFpkzGJPq0KnR0imq0rnNvMErtvu7bbKibgPxHyLWvvwA4fDsO9Mjq

JOtIGkLFdgnnOj8J8gLoBpK03ScQZJWV3D55YzEBOJ
qPOuF54NsMvDJXiBHc28yZ/NeYpgt9wncjho4Wr3cp65NOMgg/3CXKVvIkaUjg/tL03WTfBNt/rww5t+

BbVBY5YylkS9FB4nVL4Ezql+D0SX51g3QAgtXHXFsYgF1q3uH/gIvIZMw2sX686KOjpSqhPdX6S9aGzG

Exo4iDsGuDUYSlCBlewS3eI/ZtlAWBqMPP9h70i7US
fAwHAerKrfUXs6hK43DaYcmy/RG20UzRo/Rqd9xP2DW9NK89SifGBwmw7+Ar6U+hRwXCR5gEytgYItWn

5G7Yh1VXEdLqqNjEM4xi5eZ1wozD8YDLwYa2Kw8Nj/vCJ4vLyuZlrgjrbWtC1Uvw2rZLu3UXCd4RwNLO

dmVi/fpHow3A+m1kwP05ZkJ0THf6r+8Ur8IzrvdEoi
xJNiDf6j7izaAmN98dxqsAyniWJUofHlgErdEl4LdjQ9HK9k0iNCo9y5zmS+9C8xnsj1rHrXsBVFs2c7

2a8Q+QiGOourfst4K8ESmJrh1DytPGEPA/RpEmabd44SuEVgWX79W7PYH7qLySbFwjidvuI/w33PQ2fo

xbyc5P+orY1gKugHB5D8I9karLpd6rRbEwsHe/pVL4
E+DWIad7DyRVND/spct96CJo8mqcs48krBV1JJJxMgWcNFsGhmY5HG4S8FgVk3Z9psU6QsiQbuwGxcYS

GPH3/mPz3FAin7OJGt+V30JCKWpdSa8bXJ8AOU080MAGH4CpybScQapM/MNU3dh8Tw1FRyd13Guu5qNx

8pTpwLJan4TF9G+oEk5fAOeFigol9MolNMDzqnh61J
lxWDL+vbq6AAgGHCszBDJV/h9Q1sXPenPV1t9sXynZgP7KGxe07Qo7PmxY4WziH4v3aQWgOO1MOjtct3

YJdDOsgT0XspJSHqD21CUVhqu+vpt0e1Nzp22yo0PjCOOHXQGorpYNeYxTcJdhWWyQ+cPi8fgtFp4q2M

Cp8djpfrMfYiou81+GPGahkjGPmxYFamZr6Wz5Ii9U
Kfb3EQKr5s1PFhen/99bo0tlHxrkiWl5kI57eeOTJZDwciyoz6X1+4/XR65wxSnrjbJXdvg0cka2uTjq

6Q4r/u0SH7W66p0UDEZ5jA9KqMkrxpaB64ki+ZAE1/1RMLfZ5IaRaSNDptI2QOZvfqKOx8dgJIgfXENa

biQgVvyuy92y7MlTdQUnC8CMpUpDNmzrmvpgdkh9gx
A8QWWEm2tpDlPgXrMoQ05fvZ66Ckhnq8Ex5MljqBwKQ0cuPBFVhVYXi19l1Y11YNfqFFdi0tRCcv2b1b

vWBEK1LoLDQpGldLL6j80+uK+N7l5mS8Uxd6/PEDAlboOgMqJDK6uSG1faTYCPdCJZWP4f7ULDWfNabZ

k27lOUjOXQam+eLo5MfeutD59MTZy12m9A/YpGkbHd
x2hq4N+Zb28xr52YtRm3QmOJ41DgZdPrWDLRk8E78tjKwJnI9l0F3L61j/LIC436HMlVz3XDOSe81TFb

pWktAXzTh2SPJsfXUc0OmqXDUquKRLSK9z6moVoi7QuxIOxFU7S5WFsmeR9bU5GYPw7jWDjwL7kkbzmf

JrMpeEEamLj9IUgDo1igMwwWo3KDcjwfizuYTrs4t/
+lNGssrLVOU+qsgyq19ne3/6lcCaFgtSJ46XWt4NR+lF1PxtuovZvZ44AF90LJFHlrrFkPidETkVcfym

hp03Dq3B/mVN8ov7XRmnbYVZ/1hC3UVCX6I9YpwT9G1udJlo2U+yS3GvD+ozohD7gFe6rUxEW7FJixtz

q14u3In/QxpT6JuyszZJi0JQ+LPqs7Y/4sn6EPb/5d
SsJFwbL7rVd6zWMReVyMGrscTU7zI8DiIT7uOcns8I7LJJnCatnLiMsgJPd1OauehnY8N8Z1WvXZ6dXH

rB91o4rx8X91J8rHIroQM+JlY0ZOY6dHNsiMVkHrtDbBLto4ytzyl8nUrPrafzcSiNRqxNEIq0tQdUGT

5HUH1hQ7+jO14j6fRWAD7JMCte4rrAWyEJw3MSRfuj
v13fUfaBWjKkQmqqM7/LmPOyzgVhV6If6DZm3JyECq9aGYOXmold2Opu+m97u+82u0JK5XHNmBzVd4At

hHAESATJS33zsW8jMlZDMnMrgrMshWwa51Cf6EpYz65Ji1x4cIiHJtSoKaTdWuoiZ26VEjl2UvAhSmGx

Txhr4+2ULcstKmHW6u0Bbq076yZFGD7J9wZ+3JAfau
6KWXOBV07IqQ7chwrwbeR8cetlhi4ArhcT8EK4vElvrzzTev8IJmnKKDNCiHAjfYTkn2AENuillq3/Fa

p6X7vTotoS5Lv03DiX+80zAmOMzINeM9cUlD6byvZ2CcvSmnwfY8gRuyPrURsOTLR9GJkkKohi2LrSa7

5cmLwXRHvQMWGZm1oCPyUIau5WawZEcxdTW8neYyRS
DnZ14P6QhW8TF3P+YNGxxoJtj981XacUm2LF8fRdgDPv5NVxyrxwSxLUSItK8d8iXUa20O00ImMoseTS

oPPUvjA/MpdHyU9rEC29F9AsPO4Rd2t4Zfcn0rU5uqJ7RqnXwYWfpLqeu03UA8fVHGj3c2cF9T1kQQi/

hmNQUSbdRzPtt38d0dADQEKySHdJDFpO1FqE9QA9i3
jLoNXcmiHyqVZ+dZE4+ppyUAuIPmivQ4qGUe4nCLqzfl9bHZg5dt4u5gsaEVEo52CHpqoJYkLK8IRQmr

ObNjy1s9k4VE5MDKvv93PAz8Ro1YQ/2areLBKUlqReQTzqJ6flVk0d6qN7JwRPQzoT3ocAlR/uINsoUU

vYy9LTU9qiXAmFu3jewmcVM1MFTRAL1R73n7BMr7q2
95erskFcZYPj7bSrDVIzE9cUhbd03X7jafYC7ZTZ/raG5vYzmbkV5x7x5GoOkwWOHcllX21N8nyO+CPA

nLj0X63zjFRn79JmF55rGKkgpocnKwuqPF/q1nHGjMfHfuRcI9O4xvKXhrXw2vknjbGb6wqiq+WaJs6M

l+06jr4XJJJ3ydSyD2LNXl0bH3NlK6cQqq7m8WII4T
JJGSKmSMLfxukSSac4ToAiWWufQwwtc1+z3sFYoRGMR0Py3ADn0Uo16OKaqL8E+V8kw2KE8Jdugfpjh4

res3isa7BHdB/AjvTfCPC66MxEU0VEdc0bj+sax8lLEKXbfqkiiEkNQW8pFcpcIJQr2yzXh9L0OYs0sm

P1twHJMF+QggsJsfw1mCGwC7F5XLX00x+aI6M9wM5H
RnpxNqSB43f0cTNxXzRpav5tdR2TFUfnR0/Gi8UbR8JtZd0qNRjXOBCR0gb/VXPbOFRXzM4I+WdW390Y

3A2MPBX3oiS3Ih/jjXyuGO0/oI4WoX1wyk1G9Z2I42dktaKSe4kvZw3wzP/mMvu7fof7luErmk1fRvxI

0563rdhK+1cwSoBHjGmbWi8HMu9t2Kf+5MfbtsAZeh
Lv9rADSN+MHHSw6R1qafVphQAurRD6HuZtVqXEq2bSVyFuAH7naGpPodnyrK5ZoQJuwQ68gfytKjBAhp

Df/rx3spHaPJqtLsbp0PgJ0uU3dNrFHBBEEbQV//IuTnZp5bgkhqDF8Rlr8UL8QxtqyoRPLbzVwWAZjr

//cBnB/6NCXvwgNVWRep6eVu1rP2zqbZGUBDko5hG5
wM7TOWWbiPFaCPfpbueGX4OxSKgy6zxEF9PnGyi68uHv3hTEuQJLBoKUVxXM9iRVA0x2x9oKzKvcFsbE

pEZpMfu7utvCcUC5EwktL/jLv4+QGaQzdTGFc/C4BU1YUVasg33xdJq+n7KfiJ8vuIhg+dIbn2xIzTHp

lBVgN9fKSGFg8Yo00tBAT6MS8Aa+TXmVz3B95bKcaU
ToT05/6R7MTFqM/ge24A+mU07FvM970teVfamaPM1f7T7RTd0ca1MN2P0SYUmeVRx19RN3ssPIiVPX7H

fBQpfGS4h/Xl9/fZY0tdweBa77xG3FXVbRDU8m/jd6yDIew641R8XyZBOeYMzgrCOMyN3+UoXtA4dgvk

2ccBhmRltMnKBXNWQ4Fcv8c3UIgB8xjydjEItqG9Es
WsfQE+vjq/ANgqFlK/sKjt0zEc09dwAsxtY2CVGbka43QqoQsWK+R50/Bur0Io9xQG0XOqEXOvIxLMoD

tJRmD16GB05lmlLxhesRZwFWpSUjbgMJeV7hoNGCom6HGDyj2LAXSjv0qmwGDtGw15UkOetRb+xZkYlr

74zJ825UxvHdAYZte1iePb7i2AOnC2QmEdailJtMVs
bI8JmxIpA6ehOubkRH7V8Ms79BORBaSbe4zyZhKhsF96a62grEZ7xoEe3xJ1XZepM2ziZLBRBfDViV7F

+cCA7+LbtEpWuZ5xzK78zsq4dhlvo3Jv7UC0YcoTr6ppLnKknKnClSoGOElS0nmjQDg2TWgLLVSNIB++

agt4sHHG7wl7w7r+V75d7H5LYPH+/vGPXxwM4jXQWf
omb7q+w/0WWYKjBUKCAGzh2JZqmcY+T2WQa/JNKgThj3kHAJgcXweV5PSTXYOjiEGTn1cGyqlZR3AVGi

oUQP3HvI1PcHD0LvXgZixDE0ZcVrKmi7O1gd1bYg2poEnt3UEET44IoVY3UAhnF4JdaoGnm3cTMKGGY0

/XYKqmSJYMtlfj0DduQb9yRGFk3IsJqp9Lup+w9ylQ
ATkMyxplyh2sW6EIjAOgg6/zsyKq1bFqHfkGGxTGV3GXA7q+/aiq8Ov3CQq0kku23+W7XL+g/4WVA+/C

chiTS8Zxr/2XYXV/c6so1TmxdJlQcfv3LZOKa9Dj8rkX9S3oEb+CwTJ6hEJoqGKrxu+zg2JXVuhDw2f2

9RT9a6ltD6TyWqfsg7rjgEsXkVVU1iof72/nPCBwaV
S+nPrrB74t9xXFMeH0iDp4lDq0J9rlKBVW0VDlpXAzp2TsZKOKEsBCrt60ORHBLfSuf78Wm89Uftg2AW

tMsDzCHe/ib3GnSzk9qBa4uduhGokyxarGsnN1Dc+mCpBFWKfvEpheKhzC6vl4JdSkVRsZkTx3dOUtE4

tVS2pFdLhrtfl5uxngAToysitSaPJHVKJXwLKLcJiz
O7JVSB2Q5GWdHCCrRokmOf+PcwMJjyw7w/Szcv22Dx9OKrBBQNqZ9/P38kAK4Xlgq27KaP7/13txIfLa

grgsP6+DfM1E2fwvpYu1wUbaQ/RneuW4PPLZW5Lx1cOhJl3wDQ70l5sl3T9Sti5G6Sa/9tTQFVL2fl9T

Rr4BEA/nRhwJX2+2gA0JpWyGFo7jm5apFyYj5vMpia
tFB3O3wXzcLWtnj7OiBV2F9ijoV/1VgH0ARlFZ/hE4ulSjqqW9S5lBJ9nHDb+Hi/WKJNblqXuTYp7rwb

ceQ/mwG7EMYf/4AeXvW91ZHfeDzL1dzOKv2EjG/xUe1cSM3gzGaIlUDS0s/wSy/4Fr4jV0jBeI8HRtwo

VBue/DwIIgsl0/iV6dQY+FUprP3i0GWLbOWhIVcsvl
Bc7JPbfrEPEe75DDm6pCeaVer4Gn1v9eeCap0RWpyL2hz5KxM6U4X/b0QIh7+NghhuOqe2ksd2e5g8Oq

33VWUocCdDxZ8KdrHAuT/RCHipfG2JuK7uhfMLtc2APm96mOLUxnt1SXd5quplD16A7U4u0xJpFAG8dq

+vRL+2XVDAG+BkMo07Q5td1OCpaXzYow6jn3rcYxlk
nV7Jj1YvWDoM1XX7Qa2tL66t+vUgWbM2P0Q7LMYEMYLL/JAWRxhJqfliyCLGRHfGeOCVltjnP4eQ8lWq

gzY2UfHmJ4gyNhO0nK5bVAbBkkNcPBkgAPbycOTBoHeMk67ZNtDUePsLHUYZ4K6eY3ouzQC8zGrKzFuq

tqXKMn6cD6C1BTjz+XE3EtmNxximKTpfZPmLUEj5Ze
iaTmQSHmcdR/+adAeoUSLYKE+sTBI1M6TTKOcVkUTVryyxVDHwofoDY9NpsQ0+obAsmE4LnyQ9xZunoq

oLYl7bQMGAAWvM8i0sTF0YBBn+g6sA+kscTCvxAIfgV1Aj3nwftB5YmGC2Q5jhMpTqltk29ptTUNyWbL

LM1b0zkYAV6XgEzVycs02HcszbKzir/katia+9x0xiVW
CsaYChe0gBL8B9LyTX5094cpmRZ4/pKiTCDb+/LsCFgpUwrvpo5MbAKUOxsox4w1p/PPs2wLAp0pvG8d

yyec8IyIeRnF9FYztBQkqqvcRHRejJHwzda3VvYuAhHyRpAeNaIKZ9h20nlVwxIeJrB/94LUWN053+cS

+X99P1wvhfWEhRdObfItELtavpkSnIluS9zIoW0TQF
AB8m5j0vWEgnaAV16P5Bn4OXE/Jl4QDC+3i71G+BgP7K5VOtOlxi4ayV7z2qCtjbjpwVclQVF4xBugXu

b508paVCN8c+Vbup5rlRa3ix3TQVqgjLD2uqkYqCPSuKgd8J2zDZyO6MogfO3wvaDZodRWBQtvgV/eLG

9AxydVc82mB1nUtCDDCsKU02N1IQf+M+5yOF1JvU1o
zXYU+6z/L8xOhHXjrukH+/XYxBWxZcXA60QjKvVZjzDkP5vOa6eJbSPN9ZB3hVr6EoexX0mE4dxFsosg

8UTlAM/BR0B/57IhOZhGgMBTciZ84kd4UMclZuHMv64xjnDYEqNyNS9HK47ecYBgv6fb//AaVk3iyTpl

6eTYYsw/N03lSQ1X74kT0Uc/JYb+p4ePCMum9sUuKm
g90ffm97cNptF8zVNwsBcxUFr1RJ2i+dOqjV/d/9fIn+SA/xBwf60MwrdhPCd4ri4LaBee+aB2tRL8P3

KWbENL/z3PWXMw0z3ffTN+MDnv2aI2MOTtnsYsvYcUol+hrpJOR4+TfBBOnxfWLEVPN234woKmFPUpYL

f1vVnlo3ABwHX+7JcuOP+nIaNW5YJe/1fnm0NHU6eR
cGNno5cWQ4/lSwFaTPkacicF8w8GTHbI6vkLZD+2elS8tUeIqPWrzgp57sOjlry+17br0afSgOydyJU/

EuvHacXVH8GTdDJ1mVmZAj1lWnFVtDmkAxoRsyLk8vwTe7efWd3zUrjm12pod+CFGDHX8Pj88BqHAXq6

EDdirHfvcOC3k3qNfUA3ilsBPvYMGJHA8IvsLf3i7G
k23RhBhudFI/pSR1NY728z30ABUXwZ/389ZaAsI+mS41uKuh2E0kaoN+FpWbY/2vY/hm21Egh6hae6Jd

CSzrRS1/cbQYbm2RfY/jdl7UK3rETTNy1XIlo1aAcwHAYGByqyVJBo/pcdWkDtgaSsig4U1SH1grl0c8

Icv+pqS9INeLlh9gWbP22GUpw9/8ro9IgkoJ6zhCo4
K4EngUAFs5nkWTuBqtDATnFLjRFsX9Uzn1EC1+m4inmYPmWIDGjgn+HXPqzgqwdq0nDHBQ1ruynxWOgH

k4BnnmzhwYn5syuwSqpWSsYMUbo08CVmMFOGlXYbmORior09eWWVpFWAnePACxc/epNR+puSR435rEkc

yTZBHQG5jXWVfB+qI7Hw/SBr5CNZzDbnVdR8ywR1Qm
ljU5lOrqbMQzf7mCY7N94lieLKcXh3j6EWdKmwVnvJPUS2P5A/cMaZe2N5uqD23w1qAvl7oEpNcWsJwu

3zNX44jCy4kwfgv5NT//S44I5wRQAeXVXm0h6bOt/2phKvoK9pUZa+Y304pVgA2XGvuS7VkrnODqoya0

rExH2X2AdbMtiT8zGqL20fr5HuN6DsNN/6zXVEb/C6
AfU52PbisWVvsLhdMZWyjQgS5DkHl+kN4fBDp+xAbyzqghZTRHRXrXnZ8Nn4Ka3eCM86/xt/woky+sVn

xJ+67dGca1NXVVoAGekkiMLV52hhj1cMZ64e7toDVyQhwzZMH4LXJRpBoR85EwnN/1P0hz6MBYoyVDIM

sbebON8pn2l9MP1VQF7+n8jyp3W+bzRrJ/FKRp6hNE
2CeG4lOTQQ4voHMSn3b3Yr0IDZ/NcGXnyNseYPLq3i4pZl0MpOs+UWNSNbzMFAqbxNDfK1/vWUp06kI1

rbmm+f4++swKjGnYpZMasMu0RCtsEBOFmjmtAkSh9RAkWZ+ytG5Q69X41Q12h99n420o0kI6uTt4FdpR

zWLBQzoDJyINqkd/WcWOcwkNfzfB08cbcv/6X2iYnb
rYWlMMKyZCBMaKpA5m6OwYI/qs62QdJMWA09lWlgTw/8tPuZ+kPGTVN1lBJf7avd3VrQoG8I6Z6B4I/r

Og2/X6MmzW3EnKJSJurYCFPZmhTZM719KaoOB8Oz9fnEsBc/1cDKGRb4U/bP7gANVMXdTy+A9/NJ7ppi

1xixdj3Y2iqLp6be4lbtaWgSJnT5T5nWLl+eXG8V21
sFZS393FCBo2K6Ryi1R9fS96F8dQKr65Qcir28pA8J1I1VZZH7xEqsXD14qIr4NTZyXEGoSHgB4DohQo

AhLLqUlYgOAChjC2W4/e147ZVzSSB6zsvhqZ9nZiR1usRWUzwWGqt1tIHgcREZRZW3Lh0z0QXgqPjOlt

Tq9h8amESk1p93GtAXdV5UqWSf7xnEMxVrNd6v6Jcs
cI0mqc2sOwEFaIdnm/H+7M++64btREJDVPVmcgCvGkgUcuh6laTzlPLPu77+ZrM+FqMz4VBWVytfm85k

anUC1YSFOCNv8dnpnCrUgCTg9ziPM0MDFOvjSzDW+kdfujQmt6K2MiFOEaNRXixYSfrPohARlznwWzgU

lMTsfkaFpr9STawHT/pd41m0t5lBHv00+kQ4QF4SX3
SSFoiGnU5IC2PwrnrpXDzYeF23TgjjVvX1YjrgCKAX9aDCz3UBU7XInwggl54/AUhHcnfpemjryeE21X

B9aXa/Fe8Ib4Xi2ygXky/M7s4yJxHOVCOFS5VWs0kQaZVcsxzwvAja+Y1btSVIQMLKnDtkFYJuPHuDo1

8hlVEqMucTuv5PmjJ0prPXWgw1c/7VYh62g21QCewh
C7/f24tPx4UdYRVmnqn3LcftMhoCzyL9bABVb6SskC7zM0i7jE0AqkaeWTSlNiElIfb960yMpfcFlpUf

QDmzNBhhBEG+Vvf4yhEDdl6asdxZB9jmWZHuk6A95BQyOF7tQFk9tNRsx6gB0U6UgU8nWD+cxydlTebE

JiKkfG81tYAG0PogUBYAAZ53i4CTyWASbdzlgkh8di
bXpUJOOFUlEemajvOmXDBirb5noiDGfqiDUtjciXVmd1ZxWqxpRhP0FynV/G706Ab2g8ESN2niq7Wsgi

zkzwU3MVw04dnC0zlqBrIzFDefsUWPLkG7BGg6GgUtuIUkN5UtSXdV5HuPkUNl9NW1+qn8aAPFD/+3H3

q1O7GqAIGf4RpNTiZ8dxfpqccvb3DTmPmkp65K89mH
4nlCGzwzFXlAifkJtl+Ael18HeP8+z1pYIiO6rFWvaevIEUTRgRCZWn+nXFmsXbCZyNL4jEFFUKO0WN0

W98J1kRb77UEm+DwE7UpFE7qzY6huwMntru4soEQHlKrvW1ZyrjYxZeX+Vzxuly+A8K2Iwjf6EIn9+XT

ltrFKFrVz5weLwg1366ji1qK/FS1RBd61xgzbIQLSG
IFV8K7tebZmd+n3cCHT2T+u95cVdDrNukyRMwd2i8yNnoJeKaO+khu8lWao4NHg4I5PoN9kVTLIXVXt6

ip4G4Psh+xJMMFbahWc5rda8ZfOKoWc8mY5y8rgMEHPauMR25DDA7ijl8rQmKhSOhsF9KPRQb5mfhoOe

QuCcFCy3HeNW8/SNvRxVX7Bgj2Uz4N4n5GQ9t/U7Vs
cQP3HrWDXtFtxjEKuVgCAB5nqJpPRZPpsvsZhrIlA09jGq9bW8kfFWBVFptJ50dVE50lN26eE+ep+iKX

yr4EIhXosKW1zyW+ifXqx7n9e7beF0LvU3uPURObytqsbEPpPTwmtT8JwdYoAPh2836nm7fjHrT+3FAN

aVR+Erb5J89ai4I2Y1FM2LuX/az1I7BibZKmHKahtX
rrRKax1LLWEEVR08nbC0hG2OgZTjIGAPciHTIsWHJ6DmgBFM/EbuRFWTTfPraP4k1DFNgQVB91cAGtkg

8iAeN8mjnQwKngEUz4CrS8GJ9QXuSIRpmNn+4drOgn7lruVhjwqV+cWSNJFTxSqScGu5H2kiKA3FXfMp

2aIEQbFW4sjDRYv8Ol31hRI1UpuFeDCuU20udYf5hP
yjADmJ3DNnWc7BvmqGqRhvpFG4yRJtHYB6GK02SHPC/mRWmo280tKkOZcPrHQWBwK6mLOMoDAP2vLox6

pnEbdMQ5/6GtsiMxcHVoqsDHu9rUIztNZl6k0YyBea2n+OBEoovZBvf0dRHs1bbQu3TsqVrFOYzLcyTQ

L//HLfhzroFIXBLl4DGTRhIvuN4EBkSFhnVbHgABy7
EbeVpEcMDbg6CcTmZP3CSd7QnKywOk/w8zr3Da1veEl/YfAXwdstBx/pMcFDRZP92ynfu4TgylLkz8L8

JHTjQ4DoU/SxRSQbW95RS9O+4Hl7/Mt8S/QlqT0kX3sMen6kmzum/KHMqUfwBoyZP+47Ec9JpQVlVDtk

nEE23iM7zky3R50qXsGkuci/+I1jnAx2TJPAObnc57
dePs/NObysGu6umxdXh8Cy4R1gHmmLqI30iZfO6tb0cMKSidbH+2GCbzmd4T4z//bk02qkkytTRr2M/+

/hJ6fHDIqCWaQu2amH2YqSwK6TbufD8leMDj809yMANm+nYq+D9fryDmfhtKx/GbW4moO1TI1EBXBM0V

8P+paHhE7pwyjn/UiDoe3j+UYiamqrMrzZdm90qKET
dXdIXRMYKJypLdBv8OLZhV2T2qyoeYurX66L5hTk/TxR6WZkPJFaMvCqJsZhpxAsr92rxIeulPoj/ZPK

mWDPp/yujZsUevMlPDlj9ic8ujZWRwgVm5OZtdnypg/+/byoqe2NC9m61qJ9peFE2xamYBCt4b1LJ1Fs

+TSD4Xe19+mgvejR3Q6xEnTQP8sRqU03t9mSUQsu/3
walLOeyVYf7oQm7I0mCR1Au1/bbUpb4bIegzO3pi8pFesnz0I/d1sBN4i2qWBIKrqaQOcax+VThmDMpw

A41GdC00b+kL03h/D3EOhOz8scchtsvMpsE/xSB8DNlRcTithScT5GAh4VC+1++ct34JquG5BptpqXnQ

7DcUPHRUpXotSSPh4nGd/svAAfhMrl8ms4Was7ZqYm
GI3ly7KmaLiMZ9sn/zIKlejcW+63OGhq9gQnC810KmoaEX0LtTLmR4YhrFHvpX45TcuFzyZwd4dREGQT

NK2boCEy4JgyPq1uwgXBju1b44NkwRdEiPzKo+DhUfuuPK8LltF/flIL/4m6BPPHLf9OBXgmynx2z073

DS3pEl4E7+WUewaEHLrY2Zt8BRv7TVPOrzRr5tOrj5
47o8YjHvTrUBbf5URvlP9/BVsFoHqF0udGf6y4j1p4WvAmFsLey1aU3wdjTkaGR6GY8O3lRQ0ShkiQ63

yKvi6FDajKkdKmKAv4ioZ3uavH+UTKje3icD7673sVoBhkm2mVynTQgR/IASczvHQHAPhprh3/M1Jt0A

6483W7p0aDWhMvahLzuU8orAPw2nwy+dRdiV+bAiXp
XkRH4vcmRv8yGFnabMKJwt5Rb7YLGuS8QH1HIIg+zL6YHD7W/9ePxjgjOVQ+oo/n5FI7e92M33qLAe9e

0sC2UFHk30yiVzL+NkUSRHBf2HU/+IsJvjm8NXoBP/gBff3cYyTYlyRo3jqzFqcrMGPTv6KBY+AtDM4O

osZoXSOA2v4ysli+a4Z51P4g9GSBVhJ+1XjSs9nThE
4ciGSn/w1IoYbYl2mk79j03jE3cd5wfgWn3s6qK+QoLIba45YlbMWNG9jKmZTwO2oLoefwwDlga1DNJW

CDOIMqftWrJpY6UXNz6PyBu5+PfmvdPOvYMnewo4yjFSvO/rzS5n3ubVg8B43CdjIpzok5wKGkEc2slP

fg1eqCRnX/3qB93tRPi0kW3QS6fAgjWdMZOl9nVGbd
U2UrBljymnOr51E7RYpO52ensVzhWhcn1O8XJM0d2nv/H/HFq/CIXclen4Q+9v3YhTCxs2/tJ7HAHHFS

QjlKG9qyaCx342OMNc4H4/WKF88hWBr/ozrgv4eZTqM+16gLcVOnRqCTvEr2Ih7fyX1YVx5O2eThBGAq

V+Frp+xov0iom1kNyZ2iyZ4omKAKDkjGzhPB/iRYEa
4OMBO4Qc4vIJFMU98bocf3EfnU8hjMfrnxkLv50r7l2MokhuD+t9/aEHnv/gD3iCdF+pxqt9CmWbF5Jr

pFAAgDahUJ7ysCfa8ji5ay4mtVjaBQDs2ssZIJkKV9g6ZTH0KIosAA1S7YK0GEFyB76jusufBj/SrfQ3

wLg38CJwqVu0RtOq1Lm6e0TbRJWUfTJWK7VBmn/f1P
LTNKX7Vk8KB/0ZXBVsQ9a5FJhHbgdc4CWAfHdOhKvHvDVh94hn/Fmdlvpzzi6eWFwgcWONatpN3sf33t

KY2Y6Hz2EKcoHmQGzCGNfaezpNwycbBcFhz8KFXVnyBTV1M1TPIPHCJzC5ZlycnC+TplVQtgG/7uIwVh

KSv26Fks1VJsboCSLX0pFARHK+lhOmAzKB6KMwDJCc
EsyxPKfY6R/vL4HBj8uJdaCDpLG4gAEvXzs1MQvNMD73jZFLpv/6+Wv+o625OhFGe8o0ILH4LMb7b5kL

MRMW8z6a+wM/hDjVCaX4pMbX7x7jL0KRU6mF59x127Luh5IjwXR1dC9RlRgT/fYR0x/g8K7qeBN+mBqO

+5XAcwDbUWPokA3vNs+yRTK4tSVRcYaUwcgM2X0/NT
LgyMOGFtCoumaeHTq4qy9CmZ4tucC8HFifIW+5SYeS9AE0uoYqZuPX4LytKLcLjAc6vryDTqnWA3c/0r

fF7ORypLggP3Ye5Ao7uJc5ulZroP7UPVHnDbq9QECLoXPpSf1yk1r8wen/3zrd/aX/r7ld58f6LvOnjj

Kq5yf3/ME4ebo2mpOT9l+SnSY4D2DPa7YHMzGMKEtB
JeyAr0Z05F3rWoIRYU2Z9WHVDx8U4RqnYbSbvp9H7VxbKDh+YIJEG0+nGcInQ+g85Tu6wpTpZu2WbJu0

vjPWObN6uAZSdVtJTtDazy/5FlSyc4lox7C/J7G4EV00/VJ1V6m8F9oc9tof0xgNMrrvPpfaQWAe5nng

p8g7TQSTQ+3DsgXPCzSIba7qF8BOSmYG0LqLPp1Hsl
gReD41FI8fRJWwKFKxqgc1gw00GKdAy8N0WRTs6DRJG9GnCoHl/flluziCc4R9HaiDy9bF/h2LakGTVf

8tXmC8riYV6s9Lcj7oCipeLaT/PZhTAV9iqWxcsZCR8Mc9DX0qajQVyZd+jNyN6t6yOTmmRdb76IWpWn

Q0lanPauCZEgmKLkLNlaU34jA6CGZEUxBw3FoV9XT8
WfzQ2gNDiHNBnjZKbs+OhOyQmw6nWaRTk8+a7qxXTCxcBfhZiA+APmWecB+tW1KvMUOYaudBDmVWy912

tfPeYXobNtVvplViq0a+CbHmdIZewXDfEXutZ6cYBnKJHFXmvtpknvNEghLqkVY1PtqZYhNH0N3GThBW

ib+TLd5tBna4EIveJJ2b1AyYVEIHte9OwjJSqZLtvy
uJJWhLZqnr5winEVYxRbKNCNm9R55esmj2LfBYSX6FFMPhD/q0SAws6u3pCkZ8wo/YLvN8NuRNBBbXvd

aYmHwdYpPCt87jJ5TXwCxrFq6uAOjeIata+C+fkzEP9mtn0V6BB8//jlgbHP1F6K3fYZM71Zth7396pu

23nvlM9+bpXO1nlDd1U9lqar5ZTuyZz3ZfLwKT61ge
09uKViTEcibSoDCBNPv0TMdDqlK0ZvVaE8MU5ibLo44zK/zIK6YJJ0wqEhAIBHnR6TNoTtDviNv2uy4L

F1rT9lHGAd2fhYZCYXJOJd05Xgy+WFkEo8xevhl4GJuY/W4YMzD9FHV3Wqp0OQUgX44hj8mx7NvaPVxJ

e01AxevK58vRxFIu05VvHSOWnSadVgPbi0udFYiyBj
WGs62VzE7OzhFUODWdy77jP2Zl5gl5BBAInGoSjhiwFtx+ynaMEE78rRM9VIUwszbZwHZ3fcr0Ph+cx5

w7x9HEbdQUKI90BE9kTZAPYu2ETsX2FuQbXHwiYaNklmRs16kSSMTPmC00sSJmGC08a4BWe3IyB66X19

7AaoKGdpOdprxNZpXmhci4SteVE5vWd+MnrpOVUyUp
LNcLd5tZXPW4SE23ZJ33h4CvC8JQGPpe2vh5l7krn2DlwlqvtnKtYbdi+Q9sEsTuIzNJh4/UFCugPZa1

o7h3bAzwfs0v4VLAfPIIhnkqVq8laGYYkdR/bvRCUkRZFvmcTfDgjwGLL2GW3DDDLPxu79SuhGElXmRb

LKGvIMuGYJFSE0KDRHnZY5vvi2x9riWYEJEO1A+dZQ
rQkSDFhL53as1UhvKw6xLU/BzgaP5gGeVyDmm0hU8hxbFbNUw3D4sDgecdgaNmLW4DcRgGMbYW9mgzBQ

WhiUJdbCPa8rwrhiqDJ7cAwuxNZ5TnsQjpz0PKPHwAWn0u8K361IthPSnbiKAlPckn1znmLQWRhO6P4f

gQpR0T9VN4y2a+THeSP6WIjs5L9bs7ur1e5bUfV1O+
gPA9cHaH2p6o6IvjXVwN9LEsVcdba0v2oFr92wMtnKpXiNNV2IpTccoQprZ2HTdZkyFw0zVGFjcE+0VV

lKXruqvhnRiwnKwT1Tnbmn4AAzhPwtCoFvG/17p5C5mu4Aem2NCLDevbmNSaS5fWISvkdftlgjQFUHQK

kq0Lg0AgL5WPudK+TdHLMdXXtxRFVgIp2Ltap8vvHC
ng6yABF+GCWNYArdrD8vc71IHGzEYOs5f32odzyVN+6WoO+TlSiKosXz33i2pnuRy6WNUimWkjWXtEzK

vG6yR9WrdNNomwCMCzEKUSHT04MPTCjLlSvtd4BGBtRArIzwpSMQoLTeL3ZLyeon1OgILm5X9VH4xA1f

qV2Vq00inaedrL+RTJd6hXX5g1d0HWG3IrIQgw25ml
UMbMjgdBZ9zIYDW6LX4WMkV8drKOnxn49whvd8yUH9dXRXvMy2qN4OJIefsmMNTOZZhli5o+sxnM+zl3

fiKgaEW7QhwyvmPytYe8mc02mqcqQpfyCPKLIffb6a/UzglvVRCCVdYGhPN6KuSODxrkcIfnZ/GFbcwP

TJwc611QLgonXK1D5nZ3aBJJsFDCMwQIhkK/Tj5Nod
vZIO/9u+z2JC6URBGa9FnB/EBodWLj79WFjhqqPiMvopCn7Dj8PSBi2j6IniZUaoE0rexipl3gZR2TB+

DLF6s7Nf792tpuk67RWdWdxS8I/aorW4e7d0jNwRBzFddvucb3FjXzHau4zkDAunBD4GNbIUbvIlio8m

tLq4SHBmhJ+8DQ0v4Ea+98cJuWbxSFeRSz3M2VpoK3
erJDygqu8Q5DrHjKYNm/lWIwl+5LCc25m9oe11EcTWr8Cf49ihHp3pMXIXxOZsBg9cGX5AmA7Amdlzk4

2Z7OT00N36WQHVagM0HoH5aZlE27RVv/7GKBxy+3xrC189GUMvkQZf3sqGv3OlCPhVTQ/SrZBEHRq8aj

96GeIxfv2S0M2wks4m7ojpKM/t2tqkHynD4nPPc1iO
f1uftgDu1sP7XSQ3w0DrIL+I7ymbcceO6mUkNghBTeIxTlcDtPgDylhZQ8GjTGJcKU3y1xew8Tkx/3bb

pBlnnSVS6oXty7D3/Z1/nx40GiQ13HKBVrIXkKQue+a2JhBJDCVEmA7u1ss88EUlcvkam3OlQ3CAwYrG

X7Z6mBLGubiJP1fFvsNaHZ8T9A5X6Is0IGIF3POIXA
GvjIft+9Z/bMPueaOTPfOXPONT/sk1nZYfMJVV/jwxzVat0EIvS06fKmn/c1Q7ey1T374cveJqZgDoQi

NXX1xg10ahtvGAXOE9BDjkmmm6eGzvl9SmPcEkMOzZfugNhpYoFJccPecF+Q4aeEkixuh3NjzWYZ6mar

+FGccFBbRUtVHZqjZXYcKghwk0pmExLkhVXDR+xEhH
d6qrsNFIL3auUdnnejV9uKYJB/HkMCEf4VL6OFrXLn47L16oBTVOQ3bHhGHdsF1qJCjBIySop/Zm+RcO

NMlnVlYOmOF5eSlli9IIFs65e/GmYSU5aFZNJWF5IE0CM10s2A1xCssdileZ8QvQtfMsMFK08Mvpm5wH

q00jEvkLdxdx+sgk1wKuaUVPlfijLpv8Tsf9pUw9oK
IZbEf4DykxxfAS62fb8Wrgl6oYSDstogpKe/3pAtWBukbfAxSvLpCjtofFGeQDeON0AfE4RL4+a++QEw

bbR2zJnVVRsAZ7daf6o0ulimLvLdw+g1AD6TIH5XrZkJGu76zFLzSjVbv7LUWBT+En9HKP/udN/mSH8x

vl0YlcKHoeOqmpJlmUKesvTuMVxglUYXAQPqezz84P
ndBUCcU1pl2Lz4LOamwANQhVfjUc5VEV6OBWN+s/ORwJKpzORTKW0h71py4yk5Mj/55KIDVgsQepUal6

1n3PR0uag9sJEph4G9I6JL1SHHNIFDSrhbF3aJ5IIlovv+ACORHVgHDbezqcPFgF5BRKDt1I8u6CkBh4

reqVmVPEpbZcCgAMRuqO+xcHpvcT9eW6/qTTJabqP0
dKsU1EYYf11u29OBkoA5TAKi5f2e6fDD9NEtwI4cJKQfTB38KLyRB0t4u0e0T7tIWqsmqiv/YLOc2tPI

dHg2IpDGRztHArrI8kEHcGt92lSAuw8qX5XWV12nWuXzT3pb8z8kGJY9z0AiPOSIT3wHDqlYxFkw6W19

m2bduV3OJpnW+GT91F33ji5R7AI3DIq5rQIDDjyrj2
nU+my/i8tgvtHb7xEm9+g4llIF3qEHPSAyoqKj2Nrz527+2K9dPEpQf/NdGE5pGXLKwNju4/ZKvNIh4V

+zqzxVVNnxzm7FPP83TvQlDjlFAQVxtxyf4k3su0rObv7PJtmXAH3Q9b8V5qSNhAhxZL86TSGQhBP2Kp

HM388kW8iIMw/9UH9PASIdrlfEx0GCqzDFarvJc6ks
EuXE/8x81hx16n4Hij00IkOm9buHsSQm2wqlSjtgtq1O/lJkexopSDa15b94CxRRsp7PWkJi0e3z6eVU

fqUtq2xgVXy8AXAN6C38cqexPDo3G6OKPfa3Rwc+G5yjrvdUs74fJ8/qs1xy/eRZB8jDUoOECApLdQbK

QH68YXv2dEnKAyyQ52Ou4BbFrUXB7+8HvSBfCHUcXL
UV8wITqRKo3Ue31dgg2KOgienNYbtCO0Pd28iex1RSZZ9CdNtwzEJ/NagNZLQW8ol2za/eBzRQ5e72Wy

6Gr3ttAIcaMnyHYIvo1sdE1BrCQq6hih05oglUgY/JLt6WbThyb9ydJdXhELtm9sNpQc+zG606U51twO

IB9Aq9HyBqmKdxoEbmICvHHLoo6DNM2Kt9apH2TF/d
Md+36GCu0urAMY/6fNRNWDk1El+/UHT7Qv8Ok+K4o/arwLb+uA1IBtAyykjtAeCONPujCpfrYxZ16PoJ

cEz0SUrrAB7RU/1ebyB4jLdISSPxhSP2tzLW8uAnEfRIOyrtFfuX3VmvaGTKgMgJAORiH55W3irU9jDO

avtSQYqw+vFXMHqisTqYnAtNETxMMw+3q7Ju/ZLpJe
EfnKZ5W2V4W4QtTlrVtK7zMqxcaSu7pubiYr3KilmWUn4A+UuklT3/zkLHmJoEpegXZ4pskSpzrKnLJo

4GXZ44TbKoNfkO9spk0O3ogsVlg3PLPbXJhgWLwHTnIOJO7Mbz4VkTKyipBO26D2bc54mnohF3W0ouze

tujUeggItCTh/rV3Gb5L9xcgKnfSc3nb9piWCjl62h
NJEPDvKjubVCxk6Y/DhF7UDYA3hhpxktA7K+EIc4DbnoPMFdiGyiJ+384Rf01E90UiqgNFGtBMK2gP6W

FJKWbWkygfi1waaj9yV0iKrxZNxqKg668O2TM1dbHRy2qTpP4+7NXee7hwgIXf4uJLEBD8oepsGsWYwE

zJ/yHlQINzFHf8z1kubjFnkTOUbx8pmS12VWQeS5Oq
Uf6Q/zjokEUC9esdj4mYv7LCr5d0YU+ltYnJNHjrXbtapxMIGmDwB9/k2FhqzsG2lWjy3OO8FJHxoQ1y

ej5yTzKhO4g/kAWA5l/idua3aXW55LIqKkDvnkMs1riqnFBckfjAjW0wtsePYuDpJtYj+1Um/qhFlZlY

CHLysf5zL6s41HHDqSAVLjem/vF6AG4Pn+Pp53L2d4
9wdIOxgmkpseM4bgzjrTTFMjwSL57nCVtZ4O29BWa8P2NiLvy5XfQYSsh6SCvas9BK/OAhKb/A9FlD7E

eQrWPoKCj40jryjkAi0ZpYSgRimgUDTYBFSpfs3VJhdcHnTWbfxm6VlWrgtbm+wE6UyiXnthEd9EsAbg

MtvmR+2rewdx13mdgkSY8jYdz2q4R82KAzzm75njoU
HeXE2KbnBDR2/V1AUeC+F3pgM3UUnEAgQhcX8QAopZrBO9jWflDMUxixM5kocVWaK7zOVJU9JkRW8RC8

bcZ0PCx6JH/3ns68m89LwTFU/vwXOkAE8VKHRy1vIxKd1bILjvpquzow9Q2elKcqvW3XGgJT5B355rmT

TP8OY1cpCHrbNeFdHafo3Oy1uTpp4t3b1HqXwxQerx
skjOThNhcDw3JciBw295kZZJ/KzWB5PRlnT7DBTS/VgYG4+CZ4pMMLYL70jp9A7MLn15vIvuHm2N9T23

CX8lqhVXDQfuRITM2Kb8B8O8JOhClUX15Y54qr1Ph7+kvRcJs/Pm92x1/TN+YkCfp7bpxFkqsTrXHNTb

nVqsI9dRsVNReZ8TzZRgZfE9G4+UI886YadTTjbMSQ
aUGR5+rmoaxmW0bwlcpthMO/oIzgGhQ3XsUgWgnsTeVURRAE0EPvBg/ADA+KX+WLa/6J0Q8VfANE5uQA

fELvCrX2Zgudm48cVtB37H1cVvTkS8vCDmvnrbBgTg9vsYwKkjxMkGqXv4XZp6Xx2I7ZZGEsQOItQeD1

3vAsgVD3iNiXSsleVxMN2zkzJSf7ejTiu++30syLYA
r+dyPcBclcyDxfheYZpYJtZahh70PEv9fTu9WKAQ1kPIgif4lVEz+iUa1FHhpa2cmHhB6zfMQZoCPhd0

9d+OL1EwbQgipcFP1lUo2jmT3S3KSE6r/eFteYsqzPHjHhwmPrlg24Esrd2wTajoWI1c94w5GqIJQlot

LnCMYlfQIogMbC2KiLBmxgK9/rug4IqSmF+V2BKF3w
ihRMKA7LUE1nMBX7a+NSaa/JQ/Ag/QQ0+c94dlBKkquIe6lM+W/ONGs0aWJZCDVza27jQY4pv2BlXUr6

0Ehmuw+H5i0Ho+syTaqY5UkusXLpjdaGWJPqSFOC4VdfER7hSwSHm0WPh8uRUGsRvQJ1v5RJF17ca+/T

Sy4SIzYUtxrlKP59tawD2/3/19L91jCX5zQiYBDEbE
L9g/s/YHnrIVy9f3kCT7lPMh1/FGNCqiC5pwYx488BK4XQvYZci1ddd8DhSVc/ySPqi+yv/Sr6NcU/Vx

B9OSX9KMqISyv5cFcgOuJRnUKkRDPUt9dONrsBscVrzOorIgY1off/CjzTeYgbYYXATREAjSLugyz5Cq

92N+ghHttv3zbY1vti3Sk3gKKv+wqmIxRauMr9pM3V
ydSKvaC39CEv6LM4tv/STZAxrGPbRfuo7TteKduy322LxJMFnB1C0GrM/OZHxQSH6twq2Uqct0QK3trl

Y5mZAOBIuFXWXxkNNXNMuSKBl2OtHveWitCBjDpMK6TixparLRY8hX+tZkygik9bBngt7Boqkr2Vun3X

cTAcHnh6k8l+EwkwOYK8JyE7N0w7EFZujB5Xn4myWy
KggGVuVShLojCYza1ZN1hVDxO+JytaL1llRKyUd5vX1ocrV7ScjpjF9wtPVlYPB1NzpV5I86e/5KcKXw

8M2cmoEgAcERqJzc9Ine4EQt5bAySbJpZu0ovSmyMPJC7zXPPoHwrjiu7pnKLQkmQ7xoeue0Rbe1TqOP

LkiejPEO3pwmAYlRZxYY+3A/bOi1HGzvW+wBoDlvF9
AEianui25z3OCG/PSObYgpenrqWPWRuz7/lA4Tf+kHDicslIW2Zm80jfCnKxTWbHvdbYKXoNpL1LvH5Z

99Mz2xCXYFHq/R4630VNqgFaIJ39puNJc3H2USXbgNm/rgfxoTqJRRIjW2vtJJm5SFmZFHgAyZbW8gxR

9qNW3Yjyf69E7xtd4QbsUOM5n3fZ/YQadq8iEOSI6k
6Oncr93WOoxdkWYzYUr79tadHvc7e04NtQiFqFeKvjeqGnnMIrG6Q7v0Okh4WASqV5Dpor0kCu1+tGBA

QCel3Uf0lSBER462SadfNiorQVEsdezPZmQd8cSh66PJTsChy7wNvp22dsjqEMvw9BHOv99adCcrM9+j

nmhJ86lV/Rug3xiYrfG03NZNxPyi7Ou4SUvxkft1XF
su6Arh5iC/lwitTe+DvvI569DqlyLEMmAK/mKjqwOukkZa7iNb8pmkXdbRg4caQB3KE1Uuudb+chMcoL

mEjoqP8BWyL6hahtqyvJjS2lTk8JgjnUK11E7HHZCrfe8vtq9Y69fHhxZdv/WUyG0y0ReaphXFrkgIDE

jVA+TwpCDX4nGUPphls/SCW6JejOobpsiQktJ/8LmB
xHGRK1EPAEseNZeAj+dHhOdQ0G4xkM2zqpw94IOd6qY0PYl2wBb3pSo0PXuHnR9moF+E3TQkm4/e7gNA

PUrc+SfLjE5vBsC425IAsL09j9Jxa9PLpuRTMoc1f6heiHm7SQvHnfTXXwV4vSRc5LSWi5Hj2Qloy4Tm

TNU+Cy0K2uEySi7ftRRk0Fy41dMCrJ85/SSAslNhWE
vgJTReZ3M3QV9fla+Mb+UZ/BSK3a32uxndI4+8ZAr22f8A6HUr4nwlY0IMXd+zPUV10XrS/FtWkLC2VE

ws/wEgg+0BshKt12xb4VRqQgJj5DzBihAospFrc3ME2j8yhwIUZIrpkysIhqMKw4fWt4W5W8I+bF+Hym

QDcLIts/xtTkbENRBXCvrt1son9g7FNLopX9gcttoD
TuxK6Cwi5ajso4jOFVKeq0niL1HCYI9jOXY4RLYPu3A745FF/grMmNrcrp5F2KoTNjrR7/cGgRxKFMU7

rmZwV/hXRZp7LQ8JpJPF75zSby7nI/FNduXQrecJIKhU2X2vrOuHIu7mnDn7+7QfCDd9RbTtpTMO819b

UatcLO/i3DeSGZHkgZJDlH99dWOXOt+elPBQEUtO4z
V8ryj15uGysyzR++EcinfI3CdJizPQW5I8AbPLWApkH3fgc4TCatg0tsTUceeM1gvRTd50/B2+NkeGV3

K0bAZwZS10Wq6nF6Vcq71msXGO8dVJ9zh3m1tvdbFP6oiKlwgwdkuSlCLMWSeqTYpToR+cYj0ZnUaAxp

fuVoPXWTLz4e0j9l8xF/lGg8TfE1mYADieuCQ6vm3V
OxZm76+tUmyOJENwc2sozn9MWn2oKHb1e2UHBXAN07QPIn7DgOJZYvu5LuNrZYonhRU6l76S65/oYQF+

Z3l7WqUAEQ8s3uVDZRuyC3Un/bjm+Rw0Sa2/vNj+nxcRsJL6VYQzrofECaSl6ZBS5V1DnP3qjrHHdwCA

HWi6sqIZjgwtt23f7/BvT48w4vFUTa8WD1bDwoMq2R
t5aw6ef3xT7WmQOrrF0JKwIiAqAGcQifBZqAHr7FjP1Sw4PdFJ+Fiev6ZbCti1S37Pf9euaWrEWYm4LU

zj7nXiCW8565iCdnXVdy/4QBaUHI3LEzJaEdPqNN1jzZe3SbwNeF9YUTWo5OFzmPaBmFVRkVu4wFVvCF

CuPMUdYY2cbauFCtBikiVmcDxweGgCwgaGDHOXhM0M
5zXiKqVh6XoPtM11olIOqatIVFUk5NMY7tk2NFNG3TGk/0BMJNseVjj46aSYD4cEUtLITu+tMrVTFj0b

kLd5WTydZrxC7mvgXFfxs96Q6JqB/J0J0nP8G/LUG9RutOQiYEd56Me0oUAbk/MboRxiVnGC2yPMon6N

7fEQ3QYlfacWY9Z9rYFNCwjM4XmafzgxdbQAoVHJIz
gkcdA0GqufQjjasds6xieZ49PsigA0kXbQiitWG5ZG65ff0Hbd7fOdx658tfnGLzoxcKRs7FAFKYsmCh

seg1ocW0netH75KIOOyLMFcgURo8DSXWYyAKcLig73p6s53Y4qaUb6459hhGMo4U1/F0c7zFeckQ7aER

hkNMkajUv5bFZH8PrHeoCI3PHaufmzb6gvXevfmyvO
0EmCw4lMxq5Cfot6VuqNFLoqbvQkFUP7CwRkZTUVGy4SiKZwe2uCdmzNWYZ8GXLxV0BCC5110J+MKsyH

Ic81uNmEEeUN8lFX81eeNZ7OJVSdNlpdyuRBfDShcIUJgHQM4bJlryexhceDbznIaZjG1Ms3P8E6Eax8

S6Wb3KO/PtJnUQn1a8P7thpGw8jUqBQKoMth4vVtzz
huvqeHwqAeLrfKNY29ysLIiVp8n+dAmkekd4dHrYkO9jKKfWOJbLsto6S8Ekt9RreTvfkhpvqK0BJ5pp

dG5338zCt0wTv8hFa23tVE/PDNHL4JpLTMcuDaPRb20p93NMMwSgRXqs1xqBeJFfv0aFuvg04zdwjgEw

muMsii0TqbiBdg3N4JIt/67n8uWVvLm2KJOHbspxgk
u3vF9BmyhTVuqHyhpPBFAR4uFr14RorbReotlZaG8qswQvHSl6YwV4UTcTI67/FSj3DKF00viVs+Mb76

fBoXS0T+Wmu47Tsrhu/tYCFeml6CVyEqwywHmNyK2or2zGmPYAXaT40/dqh1M9dEulFYZHNvYhi70n2u

VRO35LJyXRT5e5Bv225qHACidA9a05ruPc3UGGfY9H
kCKAHI6TRWwvuYwj4ZnudHAyNXuq+lBtJCnwDHVRebBsY8BXJ3KW+WWQCfuC1+HtwMp+mSVB4ckHTJIy

d2+EoUW+yzxazYFXSYbrbb1dEac3i0vhvpFsE7zonTdLy0UoefEupZAfv8lQo5NxLO7GBrFaBTsbUUb/

7wjeETzcvZd1qLL1cqrSAiSETfoE3BwMSN2uE8Mk11
V2g048jtlhRKHE2/utk53L0YDUnFJl4LkjYd3LVD8g8bm34w/7qJ2+0ack+d4k1t6DAX99DGx0z0/TpM

P9gC33LgXKCX6m2Uzw7wYVQ/o7r5m6Xf4hpfrm/LbAW+VlkzeYKkvgkpf5JO8IITnFrYGMGcO2dAY8O8

BUdc65EvZ5cdW7nyxLZKTcEPmWjcg/qgNbHxVqtNUi
AFOulE1LFxjlYxdU81cZkaN70AmgzOnZiukgHQEfenW2Yh/qSnI4Ce4bWd8ZrGYcPJ/5oG8JiwCVX6Dp

FLG9N1BZiPtAgTrPzg5j+HE3TzB8kAthMrCnD+MRg7V722AqqGWcfB9LqeqNg4X4lL9KS4BelONFYxlL

+7QGG1QoCvHMfnXsKJUlgxP+tqGnLyYyDKDE/9cVdF
NZfCtLacy646LF2ZHRyYngpN2AFGXGZaML2n8Oc2nMgEXNvEES5qQnj376v/AUitzqiwxEGHDdmJ3lc7

VVW6lwojWGByR7y4NHFT9im2JEThj4aJJdqJ1SqNP3QAtVrhX9t0aKiQfp3Fu8j9UeBUPSuBa0qp6RjX

9OMbXodB3W11xC+xQH/VElMUYTtIlgmOoYbLZPO7mq
CTwTjQsyuJl3nkpUnHQud//2VrUfmLfexwdzxWwN93UlJdKbLcEr/jFSkoLCT/vIG9Vx0ANMc8eeDPWW

lGVFaWhrPEtIf1zCS5dDho8ze3kpHYxx7w0UR89Cc2/kODJJmXdP7fpU2Sj0y0EQln5hQ4ql9VvbPyyF

FeCfmVu5G7NLI2huI52qZOXEg5zPMo1bhvISUumHpm
ZPOoydom4cFfqp+h5JU3Y+8/s0qQHXW7+x1B7vc/E9B7ghHcOzIuFaZFX+LFUDgCXNhJT2MOVMALDVRk

yHw7ZX1hNWAClTKJTkheBvCbvKdormTx6kuaEmQWhVekVBK41baSK6izIepgKUGZShBEXCZJRB+FVjlr

zs1g9/mXEnIAvrBZudbgHdTPW1OadGL+hpJ4gptazi
fyPF6YBErJdKch33T5czufFSK9saKhlqev2WoPxaD9+P84HaL5VF2k4RunTj5FX9/P0Flb994mlOpeGH

eQJmEtcOs+1lAicJWbHiMbIcckfJ/vl0zlW0UptVudzmkU14t/fiwdAk7gJOphkbpQKuHD1VjP21WmHf

5N73nVEOI87DWXmT7WmFhVVEF+aj1hQnU+NBDIRJ32
jd0gdK6b/51msQjxquzYRj1TIXNEfXui0ShhvfIoBcbWsClF7+OVoCaSQcEBDgIEig+ymHQseTIRmMha

AJzeiNwoko/yOybDlopUu1Z+Tor0H4j4hKS/fLl9YK6oDBPQrX3ZTV0oxw0YF02nURPs8M+Bn2qKLVQy

vgBOqmSR6z1/igYiybTIyJdmRSQcjSkR+4Q5kbPiVy
EwVbKXD+JbWdxC5RlwHqBfe3T5IPiwzvInBa710LgYIjr46SIDpFOHzskuWoAK5yCVMzHZKd5bo36fsM

leJPYHacl/FDlJNaNWStyPdWwC9hBJ0Hlj/LqvICY2Q2oDqa8/W4ICj5LEDsa5xhmVc7hLVzd/Nuko0m

DeU0sfhldqrpXa60DRtVqYCRNBHJj3TrPbjHb3pf2G
kL6bpYJ838+KSsdU1736eKKNQefh6CdHK+LKwslKw8DYDi6EyKkQrD5ya4ijHS/yUwHZXhpNQJ76ulVv

Lsz/hW/BdW/NKfp2VEKKNhxWxGX+IP6emfgA/gRG2IbR2UXEHhv6vvmkAWVifoe/1HYaqPf70+FnZYL8

z8K4Psps6qEs1sKfJ28DFosKo+4ONhrKXsNX7l9SXe
4NCD/SYRpGJLJUv8L7JckJFCkO2MIp7GIbZTIwJCRERPA5VIFY69FAMPLS5iqYA6RJND+SWFKlrKqmUK

hLZE6TRpYVjJgQRapq7ySKDPm1T8WaGTgse75sROdijKOBR6686W5QoIBIqvT5eGxDxivVV+2KteTusq

vC/VKnG921dJPLjMX5fycUTNpT0Oqy660XaUeEma+S
hcG9E/YjGc8TnLa10kZGdG+pQihRZyoevfKa2+cgKbEvmd8KixiPJwD8tg/pPiTPTT6IfLj3b45BFhF6

zl4vauEkliCM93e8zc4hcx8XUIcLb0xRLVkukvBTskTSpsNDi8y0ia83dOMdJ83s75nXFUX8MUuXXf+K

fU75LP3pjZRwXKLOuA4cQpg7eWT+IOWxQKkjc3Sa0B
rPMihlPn49daIw+LEfEPHQWbAm8vP+D41M6YOcgEDvfsff3ExY5YD0q3nvcyHi3B1cpSOzSpm3wUYaq1

WVPwTrZZ6fT8PDxp6R1jtkntjaHii0mbWuIgpHabAGqZpbBkayd56FEVEbq81kg4r9j0f5sUrDEyrEuk

DQeDkH4h5Roos/bd0e3hBWz7hdlmJOVeeisKrx4wPX
yRf4ea0Fw/RMO2iY55xrDhPb6lVb4oIFo3gi6nEEOOi60YGI0ubDcK5dfT8VYJU4LspPLutAO2l5gOxX

7ut0nvpHwR/VFQAvhR4xU9MkGaoaUCaZ7bkXOVJpiQq684Ww1pL+87VW86BjaCqt/+GJjlpH5cOky1fN

20xeQwRNihOo0xpUk0eevuNznCiqJIrmC/ncBwtEjd
UvvZ0bdqlkP1Jawope5s87jyBNxAoJSOj2eJLH5TjvBhgcj11xiwVBkXMLKTsC9zpHc15at4zyEMx4z/

s5AQANTDhZHu6m3Uh2a1c3sy/YiHcLKhNNDG6TLaPQ+4aTr1EOo5sJcxqdDHvRsS3rGcpveeeezp/eOe

v8rg/OsUJtZQUXzPRtSlXkSofQe5IvIKzj4t0a+xvO
DxpBHxCGydP76RypPnlNcKd9Tl5HAMLfgAMfsRPeHZ7YkwriqYXzhRxRgRqf7PBhoXunVCqa9ZNg9cAv

7X0q4ehZ7GED83PUEFGcQDDoPDAmTVyuk0lMNk2RYoQFSRgmBTR9YmZazKdrLN70y6LXeTArlufFiveM

BTWkQQeiga5bIMg79gNW2c8x7aD6RBRgHKMECi94Aq
EMhaFOXMVpujxWPCjO7JPcrBKNFomFouYUULB6rxcXYmbsZwLtx6xJXTbtDHVTJVEIO9rL6CmojDye77

1pmz5pAwZ62kYzX0D6/cKvjK/QgmpXQi/WOpeiSRjxI+i0uHv0O0VFF3tjpvpe5GddkwPsKeUQlnlD45

bnYDZwYICoVuzrohok3GlyFJMcNQ50wSOZkyMwkgwC
sFJomSDq4wV6cHypEgV794oMFJaRbjY38O/w8iAOcWDy1rau9zW8I7WTjFlVOW/nRPazEldCc1ahnqqe

MK015FeZ49NJlFJv/tlqCn3/MZn3cz+I8MAQR6qeaD0At9NbWizdWhuXlXzcy5i1Pe/z22rWAL6XQti6

pnE03/WCWQyTN+TSC5krHRmHHZgGjhhepPWWh8nq3b
5ORmc0cVvkgqREq4BoLIUU/sfaIfalQcRku1TMZzyfIDU9IaulVPgQlCQLLLlvLrxi9+2T2RhARFzuCx

h3CtGg36kfH6ceGn8XloCBySDpq5FYS/KVXk4jAdhnE5x/q3mHcohHyYDHUQ40w1hjRjRnm0jzpiR3E1

iw5gmNW9lUQQf/QjpQZDE/bSMXn3uMOhCYICQ9R5lZ
S5FCiF1VkJbQ8TL7vE5hdeGkyc71n5yhcgFf3rJIuIncEVtuCB7xbGbPn9y/2VqID4oPiAm98Kx65r/s

lnBpVD8HmYrO4Vb4nki75qw3+LhGfluqR7qbrmtdAW1ZqSegf8dsNtJRUgwFoNgLF+vlq4ZostJfPjPA

vwjbOiCpmCAB1wQ4GZUaUyjONKuNV8GxCa57MVJoXf
5678qNBAikix7SXZ7IZ1kBlO+RnrE/VwksbEy8qJ8VUF7hhTtq7tMjUI6il2gD7n74hSF8YzECk4ewin

T2Jcq9U5ZVDEJ6SgZotopx4cRpeXjL2oUX+YN5Gj4Ot3p143VVgDxV0M2vx9WvMIETLSg2zPyz71eFpJ

DFUElGila2+Xb8NXZ+J6iy6hWy2cX0/ihz72JP5iVH
3bI7uI0iI6jz4E0JNoVvj9pvfe5eWmyufWbM+om+zvZOKP28lv26SmqTXnETRJ/QYyrulg07zjlQY9DP

lz+avKfWv6RY+N0Lq2a9adbInmd9YlgkUHrRUBBSjj7nCsucHGH4TlevEEShRP7OXo3fCGDRE2xVkUV/

XZqcFszfOFQE0UsVVpsfRLHL/IoDR9Hih7wZ8Id0d9
il2yw8ow43rF1tTMqTzpZ7u+NR1L7pazy7Fi/cfboTfOdYjh5pUND61UfSKoi2jI3OvUMDgtCfa7nEwG

j2uPFulj7Y8F+nF6V022/mSQ7qZ83NN9NFxw34FyTcMyBj1Cf3DO0UODR3Gz+29SW9Q+1xBz+fL7ATZs

iFTd8WzVrW4xz2cVUapSlbmGTtgG3OtIUaOkgWwcYH
ushq99Z9BSmEpwZs7nQNNLfcnLmdj2Mk57tLCImk8HLsiLZ8g6VkqQ+MFozm8aQQwjG9VroBBi8eF+/t

rMgpUppCzZBvNhF+Y0UgkGM3vbpnB5RYNBYIl8iZLHCmbAD/rgds9VKcia/MIeCtG7xEKyhKT2/CmQ4q

q4I5FkQoASKlRYieDFX3+R7LK04h/Tfgv7rSSKXrz4
o/MwW7H8Su5/Juq01te3ZkcMchHk30jjJI/n673pRS+Z93frfimkPc2XP5StOtzdBUtKenxiLQ24B1lr

hV7p4F39HaSdll4GaCnbwJeTxwxZYtHPfbhlP0k1lNfvz9rrxHp3a/nPXR+UkPQTRa8blMgujpnYsKip

EbnsHa6ejV+zwWvltJvo5qD4FVPx42DCwBcLNyZipS
HjRHAObGEgPDyKvgQArja199J4uUB8vFyiR55/1Pkxs+ms2tpq3CHp6ftfvpU+nb1AwnKjE+hjGujkcS

zRq4oqDjkU7NOkD8alcKe8GIn9RU6mRCdjIMjJOUU2uKOzJi3841WFbkUgVbqTd7epJxHYyg3uAYxYEa

nJYQZuFUyLWOETgeUbwJwr/WP2hQqvgcR4R+jInqqe
iXozSwvb4CkpBDUb7rKFzjH8cfVXxQw1Bw4RG8fTG14Ilg+3jAC4f84lMPY8ANDHjDvXgchZN7ePrpp9

UqeSyyIf9n7ElD4o4zETBNsca1jsXHdxejZrfflHL/OPAgWrIgjKd5FIxXbiukcwAr1bRU5pGBgmPVvl

/EmLRcAerwEZwGY4AdDwbrMiQpBQ2WTIc6aFkkjouC
WE5K/6956H7s0V9g7/VsdoS1URClqVWOmLhTf+DQ+3HOTA/w4c3G5HpTbl0/kSw4fX0bjdpmy2fKYTJr

/RsUNRCLMzoJiAY4bfR+ProyvDJPHvqPTJfgTDf7z+tLPVP5beRXgY0P1MaUAQwt2Q4X9OTvJb+gbnGt

0ibQYK8vBvqQFqCbDUpVMQClE9tzhLvjg/KTvHhxYE
VdK1HZg0czhdq8LcmA7xj7WlCiQ3ZPbeZMBNI8zW1/77vwnOLLuNSJz6Y6u3Vtbs63J7Ps46WabW7Cl9

+x4cnH0OSP02D8UUhbkubYyglxdJWNR+K0sL2xpiooc6yriy0xra/vP5Wnz6K6IvqCVLIwNFZuV2N9q+

kYEXDD9wmhsAtKC4oWmrT1WtpBs3jJgjbv92eRlsFx
btwiKO5t2xApOp1e95GnnpnrKo302x8P6IqyJoODpsj2VrWQp7w+VkzFM6XWgs3Ovsirv6NhBdiGt3NC

kFuuzOE0k/XIEmiQ/Hvc97/b/1p7i6/FGtNA4YtGL7UDL0g78xSs/QEHz46Y+FXCNfhQu3lfcgqKIETO

uw3KRRT/FIxt3t2MvZ1KdT0cl/Tq2OSiLQW/N4nHfR
Mx3445Zp29/zf1/i+bLZvAqxT71BZrqUigWqbAf+hKNe7Pmd55NBiEotI+2TXjEcQIP9Fb8ZxEcHVnQv

eKaFDqXQFziT73o8FR2tm4hkz0A4FVBEEPc4nkutgGoZly/Q1A3DSv9ie4I9D5Oei4PEudxgD65jnEbF

llo6YTDPcdrwdhnokvLx6BqbHZwi5YPUBm1WrN6GS0
qncgm1+e0/VFwO2++i9bnT5LhVx+zCTvn4s9VcyZw/3CYHMAexRi6k4h9upBYspCrc8R5eEpjU/0fq5P

eqFlUiqKDoj/J4Q1Wexl1/KypRzMCglMC0q2f7+j5v40f2Ywpp14iGMH474jMq6rZZvnW6F3d/g/WPi8

iqxMkpiHbVfnrKNGRo3o2iP/7TNQNzJTdbCpf+8Wrq
4pwN9yJabz5owqEksD0MVIgnKOeRajH76HmYSY/dsOnjblUKP2mlQ1aTMwAH3L1H3KxIUQ4Iuzb/F/aw

b4b+X/rfy/lf/EDT6mKiz2dIWbfASkizkhT0y/D7qooJ4M3jl4CgieEVwgInTNF6NeabVA31RoDZ8gfR

jHoLY97ZJFi7RLIBnym4bKpmuQUvlWC9ph5vLq+Jack
FMDh5lNHcSdwaaI99Z+exODhbcityz4+JMa4Tb9MgNV++fJfLEudl6s1IuhcL29gQ49SZ6hn6o6/Z6bF

jds89kf7kF3Ci/ZCwkXMhkC5RhOxhiN5cKMRmTEVF6bq4lZes3Zq+bTrDusVNdeG0DTDk8g+9eGC4OYI

rZtrNIMSiWqbz+szkHwkxrH6sZS53SuvGmTP078qcG
kTKP3GIdwioqOREn3r9hjZQcg8fUm46OMu09Ql4npkCIxGSDNB0so8KIG5LM48d1NpRuOQshut8Tyagq

KilgJ+c1EH3c7ttgXXP/ikFpHQSFMqKBLJ24eoqvm/SLefBy4JrdCj8Sv2/c2h69pKcDRBeQtfYGzSTi

Mjlibfbw5Y0M6+uZpqbTH/I6x5ZpPK9WC1/Q2scwh7
vzPHgNd7za0fSj1kZe93wpTI2+9OV0zMSdZ5TwMUiOzGXv5AVdco+x9qg+pnIjIhZm6rD99QeUucutya

hvtVGlqf+lXaTC1/8b0lRYW0knG+dx0AuRnw9eV8NICwnSHSfVCQoQQxcBr/dSQiuKnTmLj290sVErqQ

Ug0Is9KtWvMfuSpRwamsB3nhfzAGWK65HHJC6bN5Ot
ElIpGslCXxhJx/lRpnTsUYRnld+bqMhPQ8464CERQhMVvqLBpIRdbQrN7Szj4f9uIo9o8ymrZZo8V4Vy

YNO8+233MUl8PaNWQ85Z9ulahaX0n3NcAjwU5jIlnYnc3FmEmum55VGAgmSfwtv5NenorvR0y6PwLnZb

dOlXAojfs0BTPdFrFtf/98nLYwk9Uz7KlvCNTdXIdp
NaQ/ECkI6Ji5Y4521OefeK0T+t3OXD8UpPPaYZK2rNqi3BQsK23c0Z46ifVb0QAhNCNIKflie8v/it1J

bSd3buPQ6nPzge/3JP2mrAIP4AIYJuaa+Fhi4aE+lhhqt9jqcOQXbVUlbNddKwlBmfQaWSM/WsUnSExR

r1aZjhnKGZ7PNKtvPSj+lXnwFQuc5sMUCVw/WeDTx/
juz/6E+vHB21ysWbc8UJuUNK5yxgpCWetkq1efyRVguAMkB/vu5chNRXf1uf6b3i7sVw1j+B5H9p8bVH

Y6+NptO+Dl3z4ie0Mcc9NggiQmrSmps5qWE2MNIer0ILBuk8tKVsQrBXT0RxhN8plBCjheBxHXBS/sA9

xfXAaz8KbIZ4ZJXtF9vPZBoboNaQHwi5I7eVA/yAlX
10Lleb+9N2q4dNrgdHvgdsnAHChkNFxcOyzSVYYIfpQtOG4a7owhBpOBmA0vNY/7ULyu6McA8SchLbho

F1AcgnwbSRup+7jwI/nlU/AevBhc9qDKv6M1YDCKxgXTTD1t+yIWN1uqwK7UMOQH/eef4BWWt+R8pFri

QnIT557N2TNH6lntA6Ag1t0HNjxcOoMSWjF/pR616p
r0UkBf2/OBwZeo0uKpGDgdZ8hKsa0eLRp7cC4oi0BXvqhyyXUxiy01ZcuWD/ARL7idiVOiRLO3si3M0V

2evlBrbS5BxhILE4l7vKItZcWCLEVwrXHqfimdYwQ7Oh4f2DgegQHsThSdPQfLZVhLtVMTI8IYq0otXf

99whf0QS3mU2eon4hjItQ/gMLW2xS8QUSSUUvg2F+v
0aVPXi5xRhMH3zVb/Evc3Kc4d8ZDY6GYYpXGRcVzHafTIlBtl0SVZ9/pUliokWEZ9YbnoYhjeioQBrrD

O6SBnnFV2JVglVNMsyGeEwaG/uCdNcI7lEE1uzdrC1igLcb+PzA7xHB+UBc2xF/PLWXIqD2kotfp2oY2

RVvE4esBI82SzsMyLOKtE12fP4qyeVqS8uWjNDyHDj
NF4gMpxkSqtibVB4cEHyyVeaVpacvIAm3UnMhMJZuHiihfkTRhsKL5RHIPbJGlvd8w3v/dTx/bgHdd9W

NSBQm/zQABPNMTtQuooEBS0m6hEdbCN0j0W7ZnRhqZ7KbR6XjzKW4LuCDef4ceab9Iah60enH2nQD7JL

Krch8NKbt2uWexSdLpnQd7PbVgHEmLI0425z1HXQkc
tnyKwhWG8gtj2W5chJxGhcUCkrC3YZA4v1bMAvF1BHUbBz/ooelllT/G+AT99NjvYRdwJ8GpJ2+AC8j6

L6CesPa0IWge304sXaoLIE1vfyn010lqv0dpFC/yiOgvT1QUw+YQtms8hDIS5lf8IetJbtib9Ocewklu

jc5lAFYF2QVLDAgJD1KBRyJb8zWR9W8IaIz7wJmy4C
w6K+NQjCT/8JdDQ5vatx4nCZJu6+a+JY5EjTy7+OdpDKisFYvjY8Ci6JeR/KZndd8ZN6jNPPq+SgGLko

cMLy1c5EIleOgubguR6vzmheH2imlB+vk6JJnWsDhbL8xrkehjhWsE3UrmOnFA1yCAXB5YjWAWQbgVqE

fbwlLS/as8OA0pczQNejHh8pwVC6KZEDJWII23QJD0
5PN81PPhOaidLy95kfQrj/4gBrocUuaZ16nmXFktN0MBG6rcZ83JnIhwSFDdepCJH6Z7JZ9jzOgnOvK4

Ax9KbDAfm+uV80yRGyVL8GaVD/FhnOd44lJ9XVUds8N21vvOpUeK8lW8CXY1+UGCICW0tZRy+euGcOkG

ImeyzgVhEms8QKaJk3Zeu9sURY1nD/XCj7RJ/VmlFa
V0GkCpc3k8GQB+LoRNMlYPM04zIPLPIVKM22PayCfMZJHU9+PfxrQCtMi40rlYrJvckQ1GtHbNjLULVm

yBzpYQyfB0Ujwvlkr1QkoGnNBtO3Z9kIsOE2GNhuIMkbmSTXPR/LSJ6Akc/KDeOT3F0rBdbuCq6VSKmw

GcuDynFDPNwhtGxG0mrSVRYq358rVW5NUbYnnoQn+p
qhk40wEa/HoUYz0YHLntEdb4RhpYG8WMxEHhAMaWLx97uctBqk9D1fbB5fQqgYCZcEsyUUb6623AIT0y

HDdUYSCYz8mTDu5FVXdbGscRaib8xYd4gn+F6iPXSfMxO9gToKvesIg5Y8WEw1jqkcxxrE1ig+J612d1

7qAqeqVwiS2ciVDXknMLnfIkHhEdhmrauXrdfmmdJb
VSdOxNrE0K60YhYocnm6sL1F5ZlVoldQ2UTi/Vin5kHr6ATBaUVhbwXORXRGtlV7cSsxqVQF9jWzHd0h

Z9FGIMB+IG0gyjJDzjmImh3jhR8p8mt6tj+9R8AIkvMz9Rect7ML/2MXmiOHpu1cNFlxNKqfjuUcwtov

5JmQqsJUUjazgo62V9z7NMMwl1NPKocMTJpaBp8zUY
VvRjXu+/iTxupu1U2CdddG6dTvcobLGh8Yvhfn+wNY1ayri/Gm+zbHgu6Uu60sBGDUd234BAY7qGMWKp

hE+NxYv7HI9Sstyxrn2JwjbE+dWz3Qa9Xy7HeaoCgx/SiIE87FFL09p5NradwfGh7FhZLbY5G3wZfsJg

vYVwlT2O1z9RpSG6+OL1aNUWLzfixiFrUc4CS0FXxB
glY+66F1bEpQUNSaBz+iSOqatz9o/U9/Utt5Y2YiG45IIsUvrEQu4We1tvagoBXSNt2hgiAp/ZFgu20n

GTOUXhxzAOeQUU51JlxNaHbDD0wMVCXpPWX7vexoX9KjtBD+jRLcXfeXTas71m/HzOX1DUsjei69ZXQv

rN2HfakIuLgiuN0hclMcTeTFIMiQE0P9vl11YmuM9K
KqM4wRc9eIa2ix11HqwD551R69R44LKq1K9WTT9++ilU55oBRUfEX6fWUl9PYLp9IKc65Dqz+dDhI3DR

9Wv1M6MhENpe2g0iryOo1WtZ/jF9SfrpBRV5WrntJJ+IawKH5L1v0/oLsXxXR1iBKobgPxz/9lxYVBQo

ZYCNgSXolOFacQGIqudNzsbTvnMtKfxDahsInK7G1j
Y6s7cNX7v/NWZhdr3/TgXVtKGi8S/87sSDCg/wW0ytiRXdHwtlUIOEapKcgpdO8o4VCkpS6XsHV1lZV1

eaXI7UkFIjdZqmWyVG1eBw6xiiTLcIm6LpLItshnnafsqHzqiNDxMmyqVEGxs0QawbsWE4ub5kWiWv6N

G6SKyTmeY9HDUbDqod/N3xHefKNCy7fuCdCwwdyIKO
zhRxG0AKPQEMHumwxizXxlpFYD0I6cDws7iTWHwzIYPv3kN+uI7+AoiTHzEReYoVTzc8zF1qYbDizxzK

NSDmtmM4h4I4HZAQuFmvAwijJSa+VGWXVN5kGCfMZNtdnnZ6Vo/M7kqMk3PCjOWpG6+eZbFvXTHfW1vh

XqNc74EhCt224vFgyJZaLqdvUGkZLM4HKxZfhHwAQn
PGyLXyboQsYOD4W9SUVPdR3G96Zi58MdKJ8DbaLGr6h9AwiEGxwoPjV0SPD7iKS088ax0IuXIF1Hg40h

+nvaXq9J7foKMuWUHAhTeIc2rm+6IQfgaAShHhkUL9OhCNmtCPUmS/UAoo2m2GKJbyyk5hIKOW1F4mDl

dt57Io+h7L2Wu6hiDEjCkfM/hmO8PFsJ/59mhC1uRC
lyNZBYuPfvpDOV9df1qQJLOZXXZlYuKqzQWgXS+9FUyCnki5uG8Knl72LQ5R5hjZwqCEHcW/P+khjAdH

iD2sEvXjpEt7zI16KQ63EwaXv2htWEprn0aIDlHepfB0j2YYsQJjpj6+7BBju53fdpxVKJA/WL8R1KcH

zZIXBfh/n0Fs3RjE2jukZMxWwuGQyh1xL6UjesNJCi
5lwBkn8R5YfAmhNbNbQsXocB7786ohyHiXnM6do3vd4zTJf/21FnOMbd9zrb6rnTe0o/tf50V/iajZIW

hp3s5ofYAx2h0IiNosvh164a5q0gGpOk/IRt1v/Gpe76iR6OuCx2JYJoGpjNkLV6RpExV68wlqgoaV0X

oswj6nPazBfHIJcwLyA72Y7Hiffac//r0/ZDYb+7qM
jXXy6uVxekI4VAjM+quy8y95xICv0mIU0X5CLHT3DAn7MuGCa/5xYszvsV/MW353Tl+ilxk2BFE/QU9l

1sR887jg+m/j6wADSyzFHNZtf2q/YNEWZYksLZQ1pO0aIYGYCdhkxm7LOmRZEG2OPgfsK1EAK0I6+dnq

svASkp1Ph3BZwnZFAQkrX0rVvlbpOZR41rCR5qwx/7
xrQY+WgiP6ozT6Nu6lv8Ny48HdlyFmeN+Wg/M6p2wOoDDhD95Y5MrNtdBgHi7/OkfCQshU0qhWloECFF

+XECPf8daCviuw0QOhK6lNVrIoaY/fSF1itJm1202ZjrRYVjz5InoWinG+XzKGR5BWAfCxOR9NxoH9Ox

m2nNX6PRN9QeiryzOw6gJ0e/CpufrHa+YUXxoCci6G
Ra4Ncy/ubB/QZRNk1DJavZCbM+xmyJCprWudwd9oO5eJrPQe/8t0jAGiojYwWgXZZyONZZJqKNT/rcrP

NKMzZ8aNXPimK9lg/bHfBV4AariYaF81lphxfgFwjOaFQSt/w6CQKUL1y39ofaahi2qKjzYMAUkhy2oo

ykTj7i4Q/pv9RjzCrOgh1EhOL8Ri/35epf04yO3Bdn
uCSILZT4Sl+fzJIPbwjwEkX5gFR+JpOHiMWyeBC01wRZ+5cjpiLAnKsvzs5PzanHLabSYcEp4AEGOuqw

92UnLwspR/Zb0H9FKpHr5qcmv5mDyF2astwUCjwberHAwb2EGvxhfoqZp2qkKHbUXEeqKdlBJLqCBqO8

VUTUo58Z2vfW8E2lFKFkDjenUQaXfKEZOK5QUbhorp
kdt4OFwrI/opWvFFOW2nLuxy6L3VpnsHXTf1bcewL026wxy9iD70rBetTpsssoOu6mU4kZ8R+JxWsXeb

ZGQIsCdq/jzLw5dIr9WCfINKUTv7kyPpEZYB/hfuEb4z2Kz4sdFjK1adim57V4Qu5VQljA62MA/B76bt

uurLQQxnYAPPxg+yegtYUB7tDKcBnZMyKPksB1zQ2/
dWbDLBAa0EMk8FteuDelzvwAAP+9xZexLtg1K71QXQ5zQnWVT+jn5klOxM6VRnRz7RwlkJa1p2uEkdHZ

O7fMY7PlpEfew0u5AYOpkbMQqfMCQmnFEnjd5PswwHSztNFbxmhSWxodXGeuqjGNqVOGCWlyHeW9o49E

HuxXwWJpg6DhoShGfXA8yIVDqCbFdd3AEyCro9j3NV
e2UB63f7MH7kbLash460APY/rE5ePUVQaEEyyvrZERJ1Nr1jgQpcPjhJeTeIqzTxkOwwFsndKtHh8Cxw

y4PAWp52F8d6qDpQGmqP4t4ZFCwx+XEeY03zhk9NZmJ3fdvp4CIuKPPSeSADUj9jK5kL2dSbKl5ipwFB

Zk0iQIWIiEOtxSXDsfBm41fVdfjpsgrFd+xcyuiQu0
pH7ah0pWegvHuFPilxY2GNuInI0GI5z2iqLpnuxljog6r3FyHaCXe/M9fO00Cx73LJ8vQpS3EtLlUY88

BRDdDF5KF5Prbl2zM3KoT7XsRhyGwrzj+moQ+70lXQWl5OgMONnuOMBvjUQIMNFD+Rtalmfz74ZB5H14

ue184VZ4hPoYLrzmzxK2TEDWXW28MW2+Dgjnq99IqK
sl6/jP2B0i8yTk00njQx28I0yIoLFUBkUg0MKGUkcZkvaOaTAE6+MjJV32imBh6PqfELLORxHFcpSttu

D0A+9k2wLIVQ/Vb/3bcE4jGFpjF66RDHZ6bWPQI4k61JN3OM7TpWcS7fAuZmo2kE/UJt2bUh4NBzGnVP

E5MoYGC+JqOtUxwceiZ1FOVJE6i4q380X7e3s9m27I
cV6SD/+P6MyZAqQbcWxI8xOAXTWKU0Pumkjnb3M1xAYhIlc41QK1sljJ3TwI3Ti5ZAWzNd0FXLOTaUWl

3bJI9jqaEcyNr/0Ikb6ohaJZ5vNacaVYhM6OA2PiT/yJMpyPtueRyh8Bq19vDgyx1e9CwxK5FY09Cfux

qULunv1+n/6Kd9M+MYE5oQ/OLwXvhqzhWO9H7PgAJE
jhOcRkU+W3m/VwwA6eyodOxQ3YuP8h8lbn516XYVc8+sktQHt1Pg6JBJ+3H3rQ00AZv4TO6Bx2WwG25y

NiXl8cRME8lhHeZ0xEoOWDYvv/8P0NOEz0Z1FOkSzxiLvlZrgUDD/GLzV6Aih8Fz3waFXiuOmIti1FgJ

PAt8i+2T+BspWhXdU38cmH9B9XuBwC4pmrbldfIITT
LxKPsE7JjQVync+nzzWEBeTXSYFsVUIkrux+RpFjnFrI1hvWfBjtUQFP0mpXvuDoTVVQcyHOx3kFLvQs

f1oEAvzN/8PQfXRHc+LJTa2rfJibzN8d48DmlqF/zwita72Bv/t3nY/1aXOihUYdAEaJ0pLyD9Ej9KXK

MA/Kg9Pw0w4JTGK3SXVdSF5RcOZDue02rylnP3ano3
KydQe+L0h5QO/TN9feH1l2YuyDlwpNrEPmjmIbvaOnkWopaG+ybnvC6v+wVlj509d37/txn15hVCEzR/

Mdj07JhwI8Wp6fv/WGlsW0E1NUgp6srPrBJXa59RQi6HdsfCb4aXXWV77/uiauyvspYtI/AAMIZBJMce

gPHaOlbRIn1NBKpoWoQ26zsu7i/RaOVBWfd+RfBQ7R
lSbNZ03TgYMzl+WuWYb8qObWyPg7RUwR98sl7mzTZbZIHsTx8XJcrHMqUeRU0X8HpWfWPrmC/2EfiVdu

8EbALauoSo9dihzMNpXlDjWM0BHCmwiX0ejeBTyyXm8cgr4p5RYZ87Vgy+S7TI47BA05q3xCwtl5XSrl

DtVYQuQCSmQPL/ZND+rXKV/7MPv1ouMJmWgAgf3qMx
6W8fGpooEB+/hefG9UgIhcA7/Efsn23QlZf2Vh530Ff21gYmG5x5vRbqrYtcbtyTF35lBx0GMGEjZ2N5

3jh355O0sAIsk74PI9XlAP3YnwQRFDlt+vXKqX/kldp/eU79n91O7AY3CD6c7LozLqCAaHv6T/ZBT7HV

ZGNx4djx0u5DFLjW3G4c75Pv/orw/o8Z/bneZQME/0
apv61Y+k2xxzWz3rWP869ofS3QAX3kfFVSJfQO/gvkNKRFdXAZIK+PMrwB7DYyW1Pn9E6HX9+WBpu8oF

4JS9x9ogoOAeBxMeDh6cv/fSr+i3gmi2UM/+D2zxGTFbqqd+uqFESQUi/NqIfkmTYPdENvjqcJpu9sBt

eU8aLKpiMzKIsBf3zNq3jczTko/bHsJs9ZEbV30XtF
wY6m2Ix82ZFbQUBpJ1wqmeaVE7LLNBODDMuCJBUkLMgsukwEqUbnG+9FAQL+8xCc8YneZynZWtafMOIG

5d63Ih/I1KeXq7+5bl47yYTYON/xFe6fg/03s57uPoUsT9WI/OmRur+Xdgly2Ua0vufj0isIK55GpaMo

r23YrlDRXOHk8OkmzuQ0xwrF7EyG+YwkVJsbgs/O0J
duVgpYEJ3OdQ+rr/7NWavC8NIsglhwBojS8srqIARJvc/Thp8FHp7NJbIwEwswK/4QK/xzdoLrBhwgEF

+/0S5h471CH6LH1pqvK9n/WtzsFjLhpEvFx4aGutky5VZzXtkfPLGIVpYFItYf1hELSkJem3my8e/h2v

U1FbImPrY9g4rBngUlVRyEGRv0RxzGhstf1z+Trv8E
ShYIMVZNL0gUMZCEhauKyhSWYRxm7+flLAr9KP2S5+ZbilOQviIy1LGfBuZ28R6/efHhtBJTjteTGVna

YPhCxisH6psxBDGlxVw/uzsW0P2R1r/qXv0LJlRyBN/y0uG6lvppiynVK5sjHU2qhEXc5DJVEU+/j7Nh

TWKXn2t2djNa/ZDJVfGtmunRQRgOJvU9KYVT/zWm9/
8uXKQxnyqWNFXKAckuyEQ2i3oyOKu2Mcq/P0qoLFMPO2H/NiFERY2rK5IUVR1WT1VSGIjrxqVk8DIx2S

DBHQIEd/xctuwoShtkfNKnsEcfh26+I2oKp95j/3Eo2ptGwwdE77yen2w/wkdrm3G6DWlRp8F/g+/R/F

8dmv5/+xydRyk1/it2SJ0sV3/HdwUz8u+Uq5/VkKNw
T4rqaZfpVoGECnSX3fFXJ1NRf6xpYmCEGfenaghrZ0OgK3kTE1Mzi8H6kQLLE5HR6WjHiR3uMEhOIfmN

Onbn2ePxPQk/IAFbhfbdz8xcordrveR0XuGJMRHd90FQiAMbvMAI7MHaE7NcTLd7YQlJITFJ9345+sW4

iXOgHzlc2xhCD6uoRSjRVmcrmO6sEhBYLquAXffDe2
gPls4W3vZlb/RtCEWFn2lfEDJAVOX0a22+0C7CBbRARk1vovSPsCv4HNn1QVovCQaT5mrkf81cELNAzp

9R85/gCag7trvPlMnOJ1Spc67yXMZCPaWWvDk9mYwMPqJSriuS2baSv72ZbjhX+9QkOwYU23W828CCEm

/n1sPPiBQcU774QOBlcaC9nXKbrncJcULtnpUcXJ6P
jrwL+F88LCVlZGE103HtwojsJin4S5BNgFy0QQxPOB3lttl+nWhQEij2+CKWvB3zbnodimb/UwjsRAMJ

q0tH4dGU4YzPsVn/HF1bF8s2ReCZjnw2/i5KQWp2A1qGHrN4pHRolyZII7O8rQoXgPO/+Cfwn1+uuuxP

62nGfc+aW7xkUk+yQvRhP9J4qYAmYByA3+l5VjcWT5
ySsep/TvW73fgKzgsezyvojjHt2jIAUrzI1QkKzeeEC029zJeNnaX044riAAF3JKFr8l+jVJqT+uyKlS

IDEbbXdQwnbHrlZ9g2G1I+qkGXKK2aknCGi7x4okoQaULwOtBGljEQNgxGrn9T70Cey5VB0jl6eZYqE/

B/64ZH/xS+HuWiD3cACgaPLH4FYShySEw/h6vWQpZt
wFjKzrl9ectjf4fysYlGxVnFe7+q1kjgsfhgbshq0kf/cc6K9JaCRuAvCvgXOMXWCa25uHY/0vgDoPTH

248zEqPqIFnTk/ZKCOo32PERneUQ9ogU9V0HJCo/BLkTHTzYosxe5nY1iO/T8LY9EgDLsgg38nvUd0H5

db7HeABxWbep97jLGxQ7nN9S8WRZwf6e/53Ceb7GB5
jbOBKz3536cTV8QhVDD6O9QfiVUsR1FiBvRkU3upQ4wK+CEC8gtD5u1L51D/qDsCCfG6nhW9JUvJF8ml

V8pIYsO6/HW2Cml+h6fV/m2H7MzQeBJ/kZDI4zCsriXvSHFdUssqUGl/UGYCpgzjsJxVqE/9gJgRvnsD

jFf68VGOab9KbXniVBCJAXK+I6uG8elhaV4+NCsp/v
i4ah/ASfiRXHoG/ZwTkcXaOOb02/elRJef0NTQREKqc2FHNYHYD3SUacI44Mkun9kh5ZZEFDFZOXiPpH

dYQL9MFj5wCkaic3GxzNKnGZKQdOnarNPlbQEuzmhxOvE+CZvgBOsuJg55N8wXP9QqPwMGMlzQdvd6uK

VbNwv3c860BXG+Bf6dUPN0J8A+hS34WHFHJkLJ8q13
1qBkEpmhrW5XQNYKZjMjESesw1DwvG0jrET0hxp07sUlL1gMBW2ukwmCWrck1sMqkf3gzbWc1wCdAKJS

N7TVZOBmS1euLm5uVYVQdCUSU3jGMBKqZY9ac5JZyx6wKEQebo0j21A127LZ15YGvVu1eaT6zJVN7L36

viACSrGpbBh7S8YClwa+h/CPhDQ14FBuuV6w64fvTo
VF8wVjNaKBZzzjXgOz1TRTvd379mNnqQzS8IAw0zLqjCSQfAwYL2q+5hHdJVXFxSeaBARzfr9ut+dN2c

/IUlIfjblQFE++SjYgOfmi8SdLwS5v1PVt6y/1uz172GhySWJWPXWKZreoWXfBc63StNyhvZ2pL2uNy1

dW7/uU+l13BUVmf3K5PO+5gfP7+/fIqmAip95jZf7/
U4j49jb87i6Kt+ArelpdD/+MCOHLF961jPUPQ+cCe2NOQ0UN3jDhxX7bza07ozSrRxBi/IroY1UQ7blm

jlG/0UshXqW8n3klHTl2JmPCRPb63q7qvgPlhrgmgKXoOyFL0v83ojaf1EaIeJ6VWtwmWddnL/xc6DhR

64dbuP3KYwAu2FyzmI1fBiMc15hJm30tu+bFuT32VS
BQ6BYzvKCJdvfjYAzyEqJMWjWcFfMO0lQlTSJxKWEpFBsmf4wAMv1I6lqZCF/3zZGKGggtkJN4X9fZv4

cds6Wb0GwBXGIvfOtsJDEN+xt//8TKq4zSvRwxC/YAfvf69VdEU/0xlXRu47GMgA6vbh197zygUzhOXh

xxFfj2H5Dh/heJskQE5WYbCFApI1cj6Kd/AwZ3S26p
9l2nkBfCFRn5L3L/27E/9D1M1gQ6S6EDYY0yGggXN0G4ON/jbQMM2VOQu4xZtpKVGGdEljzWBw8LyGqs

jJdp25fAA8VH0roVzjRVUcUUw7FQ7oVpXoJRX45MPjfPFm/8wT7d/psRKdy1d3Q7jxM2RT88L+BhmwRu

0mZj48Z0wubIWzuXKHZzn0JmVVM1YAewno/5bbUxJ7
4VaSEW/4gtSn15ckHooWeBFVbhWD6YBMVvMZhFpddDKaqaJz/bCMwpf7IppIgShpOz8d6+khnre3GpBL

qRXx5GAkI/yCHSOL7WZlTFWB12crWiVOb2B1iINBJHvqldqyjysA0EDo4IGX7VvAUC23lW/gJU8K0YoD

JmiS5zTRRKTIbvUCxOoXhH5MPVtzvvsdKqllU1MK0t
rUycpaPQ91jfGSFiv7CgBxq4LfWdCuPSjjQCMuYcwWvON2N+lre8rbTewVg8otKXbrz51pOqo9RQpK9T

ky5KLaK/ab9Mk21JE2tm6HrzoL8t7R1rZutqUNjUo5hlXipOTIVGGDUh8Knk6rglL8dls3wIj7GMIad0

sSsWnqjJ8O1Dz6s+k1mgtK+wDYIZhnDgAf9h/ZXq7H
nrE5LARKbd0E3qBvkg5TvsEJdInb3aWQ1hrPZM+sc7XvzN5MeLHmWwyjlvFcAEyKlAyn8/3X+BJJUPPC

aJ9NK1971xQ518w+1Xohx+mHp0A+WfTg5ieM76dEUPr16j8ydx+WyCOBBuaFSyXgYwezJFMeeQE4BEAB

trVjI3pW83c7gXgKiMNpmEYB3c32IV6Jj/h4iIs623
pILCzjs9+FHsjVAqS7nIa6bMY6cjjAYNJ9CATHfyVRc7CgtvmcsS2/XuV1EH7XCMi7gHdV0fCFuthBw4

rWI7x1elA5QROW52XpuT9FSjSFoUG018Qz6KZza271sNkx8Nhc6o4HgZHABCW80uhWsdJU58mz1ngEDf

bHfWSamuJCVtthZzgkdIPGebjbLqgj7d8XEokvr1Uh
hnN7e8XKk8fQqDBO0w3QdJ6A2itzdaobrWhfaQYHPSH1MxL/weMvRPw8BLtfb62GCiMvLH50BhfNFI7N

sJlvk+yfp3U8rA5lonWzwF8Q9c1wUzAU74BqH3vBPh06I8sTfwGU1BfFYELe4RrYbpxwF2UK51tx11Lk

TtwVJd/NacS+0r7pl9fJcvSOuHW/r78ODdZXld3zpR
MC75EjbxbDYkr4f2BpKwYxzJcme9q2/Jmv6X+nUejiqV2lUYTyfOR+AWwTn+0jxN7FFdb+52dMlNPf/v

Ya1C1QXdQQJhAhshLHIPGaCjQEi+ILRYaHsYPLHrzGW9pCd+hr2MgBF1KVhhLSfR9Q1/sw+0vG1kjwR4

FxGVMXuBxdu3ecwC8APCpS7XOlAqDf0NfF9DtlmlkW
BaYVK5v672GSDYckNAL7gOojoeQFq1fre2zgT9Ew1EZsGIa1ESIVA9AXceklUupcJchTyqtVfhg+TE23

RYmw1BKyw7PIDLzQmI8gJM8D8JFsrabeGYcretEL15wdZfBk+1gAt7BYGiVZnKK2ty/jx6PZNc7cD+vb

Mj2aSgYXk5PIZlPFsbcykyt9fXfK7ojoz8IhacqM5z
fmXrxxh8/kuQvCPt4t8mRHOLteSN5zMuujTOcP9DlFwuK8zWIusIiEGBoswqSQSTEdfkojwJkcCg+HcC

teMpd6V9YA98HMXbVPKyMUXRnOZKTUaqspbSUxEeyOz+wimpwMpjvbyyBfZS+ntp0J2ti08Mof+nmFv4

/ehss/VYtvifjyPeqUS7LmcIBu2PBJW35jRomxbP3e
laq0QBOl2DvmaSJS9lPb2YUCYSRYeEcmDwQurcHRzGY5nuhc7+yj2LSGpcl9iaO97vSmQ7hO7LuCMb/9

JGFhVIdPilZpv6JHqvxLDUc8s7BWtPMLBm0me3el9BImVfkjWxrL1DMqtun3SWoFqcH8YykJmXolToj3

qGIf9JwRBjWBk6OoZ/8bscXqYMhQxp5VQr7H2jJ8Nu
9+npzvxjhW04v6Ka064FMWetJzKgnwzf8tP80lphJePSGwofuMZLxUiyWJkNg6xM+Te9Vfyg7r5dKdhY

PCTmYJiXWkKs9hMkcAjlYfNZt2BucIv2I7zs08o+IWunWy1nw4278cprHEF97KumjGF1GcqCDSeMF41T

BziBocjlVk6ZyiJ7ohE0O+NXJuJos7nYvBSFtdIoPV
TUF+XUg7f8xTe8K+EuuSIu+gIi6BGD0rKuDAfBro5ya2S72ZSbOz4KfzTeYaELEgFVvajTgASZsWfZVh

I4n8I1kEa3u0LoHRjP+KggtH4TZBR+oB0F/inw9PufbVlB/mRXRDXWF8+fHi3F+pHnGCwY4RbSGl+nqb

AKavHOzh9Cilw3iTpj6jbp09ymIf9t9qjZvyquHz0g
h/+Q6cWwJLRbsC7AgZ7u9tCwqFsqaozInp7Rc74kC4EcGzgyL3HR8LLONyl/P751FoKjLCQwDgvpSOAD

da+bgUXZxs/eTU8fPn5l2gEH8hS0w5P6q9z8lWFbU/T4ctczCgOuECF4bsK5ZzuZ6VIxRcg1/1YzW4J8

Uf6W3ogiY2Rz5imL7F7B4PnHT0amXjwVX82fzzYR7F
CdM1BvHxMVt2up7SpUq7ezCYZ+Vm71W0r9Bbfo+8EatTHl7XbyH/2PPmF0kxJXRer/Ww33crYObFw/ZY

fOHYWIcpqP13jFbLPJrPcbV0CcVBmIQad7deYn+90xeZH+o+8d8L6CpaQXnTBs5UBASkCgpwy+IqnIbU

jkA93odofC/gJJCVuwL48E5ZyAChCN8R+W5g3ubvvr
k+T02xAkJkRVrYMi/5KqPEhOzX5CQtn0zFOZESd3VP13FMkYsQHtAXazsTDZkwjUYMHgboJoyy0y7nyd

PzKlp13fbFkVntMxkUZLrObcy29zkJVsGPOtcpFlRmT0viS2iP2n0s9JyrgDm8mfk7iDHEJ2Er7rzpXm

TTvyz/LemGb+Fn7n4d2+AtOuwcDKPzrmeil4M83OWC
RcLbHxYZl39F/gZU7ogUfbiSOqVmm7WNULqi/4gOoig2pU/u3Y8KlEBsKQ08bwYxf6vz+6XA0UdZB3ro

fODHaYZsb+de35ksCMIah7qTaxBLqGm2V8agaQXsW3Du4E9TX4aqcnL1tD8mJN4ne3vX9Qrws7zKB657

NXdSQ6eSiVfsh65SxmKttZcNk4Myj0aionmUrwvXYW
vw9V4P5++cVWf+E4aFxs8THwJoBWISerYmn+HNd7Qsmmv1MKZUcVJBwrnFULGKtX2vdgIZwnaZaveIgE

HqCqwMNhe5hyUCrwuLIl3dI7z6idoYIqEh+hf0sgKVLGCHreg4l2mZxUe8xc4quUp8pdhx57udtmMQdB

84FoZaxTP+cjv6TFJxM87Ae7PXEE/7nva2fCeghczU
9PuRH45he/hyYpPYmX/XkooCFLbDSQKvESBWaaeEppT9LCpoudffmpz07KaIV502EjBI4x/gD1N8DhEl

bX07EHzR60Ok+lst/6C/Yw4ue4geKZ/yVaj5x4nEyvcG/eIKgDS7Zh0kVTvIyBosUfDgn6/6lwvs56RD

99wk2YPS3hYlWa2UF8y9Z2dc3cemJb5ihY/+PpX3fR
ZgwDiKO1BNinLhOAoJd8C+I7x/+z7sEUKefSI63pmVTxO5ON9NBaKUZrlNOmWbwSc8paJz7/B0bZ9Nm2

Egp8PL2M0fBlywe5WtMtx9Ktoh/d/9p3sqJJrkEd9RNWKv8YTfVu4vjPeU2jT9HQ8aA8hUV1arDythir

vAi+GbydO5kGpxu/MmeyV6oyPh9Sjgyu3k1ojfFCxg
yh36vvlZhEnlU7pzM4sC/lXeLmPDSfMuKVCmj4TWtBYkipgxxCmPi8cLv+hWerpXdU9lz+m/2RLmc+0e

4bsQEd9XkLYm1mhu1Dg2pMJ9xwsVSj85TQxmGtK8cZpSpoeC9QGt1KaNuHHE/OpQGSjLGy5DSTZ5qDNp

Ky9FAEWGOedpD0jH8/biJhC2qr4W/ln1LdGVGq3lnK
5h1yRuohKg0YBBJhHIigw+4NTrkA7sPlgAitu+xrWVwExyqp6uBg59tlu/gM3HOilSIoILQbUErXT/8Y

mDKrwPhZ4PA9rehUxE0mdiNGnW6YziuRAoVNFju+ldtnaE/Vq7ZDUtxFfU8SWr53a5wF+8UTMRlFt0BG

U6mWuYr1OtYyhWcy1fK9t5HabRiJLm8+q3Nyn4wuRT
AF6KUDQltsB3n9bqXshe8KKSs4omt3JF5o6mQLqoW6S/FSI9B0fFkCl733BX+00LXXpLrLGyGnIP7Wqu

oy91bFeN00k3q8sxD9zT+KNYjUMbYYZLaLeOqKPdqItu1jmGMZWofmYMRwuI1uvIkwzTMePI6EmJf47b

4WEgjr6yZMnHbQCJT+g4rORgi+E5nRPRrMsMyAfEgX
DQmqsPuAQSpd+YJCn73ZCJbCOfNlSIwTeHddXfsVf93pwmvrvCaK6D2hHBJxUM6Wg2sG5Mh+9ftjUKxx

LfcTgNzplHPtIvLJkQjmy6G3N4ZdGZLaEUXeQy3/mvZMLXrFJw7Tu47hMz3OM48Uz8D8vKMoXnIm2YsG

y2nmNFE1xOu8i5C5x749EXAsJ9O4nScZQJyNDIwkr4
slSFOQW/5YBApMaxh0x6qY+Bb0+CcyM9x0+xVYSO79lgRP/j3qb4SM5wRUQz1GlJgeqnoybnpHOnY5CL

Y+rROS/2osI3TRYPvBjluIqmYglpDVBpGgGEaFlBcYmQSBMtDskuPc+Pgf5sTeivvRh+OoDmtX7VcLpx

L2PdEA5N9M6kuMzgRBpY+SRJXy3QkGgAtHf50R69jR
MvhB5gMfbjgtt7Kdvn+o/AGt2GtyQTG5sAhXhQ7hk0aMjzkn1lHpAghsALKs5Dclci7CAtSodtD5ZBI/

tLyFHcRVfTu1fFV5A47bQS5e4m9W73H/f/bcXeZH4cGzf0sMf6dgE2JT544VIkbvkvv4xF2gy4lfms+3

Kobp+Ww1A4DiqeL9N+r3znWfsfo63xaCxh5yvAZtoC
ASfPDr2klLNSkA2m4mfniBP77egvzJr9216tV2aym2vAq9arcRrxchIGbQ/XV/P7CeCxfLlMPzVk73AW

omk/nJl/eZ/gBDJeATtKvreu3btmmti9c0RUP3GlxThVlUMnP9i31dSpGAi0uxvR37Mv0SW6+M5ONHqh

UmGoNG0fw6W/oQ47IVwb0XhmI5w3VSYEQadaFTkS1Q
HCJWlbxyxruOKJTBUGA2ECC9MI0jKeyGneWlGPUBhH9t39Y7O8JN20jT4UBUT4EACfPkfHkSPqmBkhGp

Dwka5AGtjTx0440chSd1oCUY93+uETA811Rfva7jcHuiOkPJL3gQ4KCe+1ZGs/3lC3H7LXRShGf5Qt8w

Eh/ciKtcUu5GN4pKi+wwiQ7TtFDxqJubKgEfp/uOKR
q6rMnLKyYmCsdDbDafSiVpA1flUGkAN7QJGFW5qQo6xlsc/Cy3eqEMpbb+RbU9AGEbMahDAU79Ho5yJA

oKERUKZXYJOtL9jFrR45JkiQ8ju3DqNVaupKrIYFLT7aqJ7ELBMof9qvmoA7+PMZzWn+pCsZrXUyL9zd

dL+lOl028toCWSdWj6EFXdA2kg6ET4XoF6CYNhpOnv
kKVjEyKAeFweLNUQRIhBsU7leTy8xCodrQT0QKKQhIfrFNyFcQ9YNrmpulDGI4yi/aQy36einsXZTYm7

4RbJaQq+kaaJIH/pTmhPNBoUvzQ0PPZI983ujq+Ijct657IPCd2tmmrcR0sOS69+Q7V32L2nHksXeEcw

S/SUBpiXZdtu9z90AvgSGUz+iH8z3o3PVgICn7ZR1J
UfOszHD18KHd5TJu5K/pe62i5yID/O+Yv/H1v6EHCqSUa+pk+zGFBotRkro+0cD9nWMtvqareLD/KzaL

G/jnETvKz0dqbDTnAIGJBNfi+WM4wTaCrKhExA73OC+xav8eGaUoG4CQsvyHdIO9/EPaE2TJi0XP4kw8

BiyM69IGqzKj+x6u1xfe2vM9MYD+ZtMy7LVlB2xUfw
AQF5LwJXJY3PfI9Cm4Xd9cNZloSYXoN6h93e7dbPrOC+mqY5t/uv+b1fgzuyfIZKImxp8cFXIciBsLcP

nu8xJJK4u4IVcrd80/CC2+Ndxvfh0J3pt+p7gaukOZSDnYCZ0FCL6TOKwctp7mZRTOjktvq5csXN5JxF

dDu/YwkQBBEUrypKb6YhXeH3vZR7TIQGUNmHnE07Ex
ynaOl8MiAkMsDg/OGm4TM08+8C/bySNJhex89GISc0KXxM7dbbFlBI8NA/XUODWJcvVneXI8QKNqwoow

YDugcHI0affi0LaYfP2c7ZPNaUBph/yS+AWMYkg2yKSpJKKOuxjDYwowrKVR+OGfcbVIr0dcW7x6RzoD

IDUMKc4qruSd567ASMkayX/RR69W2JEe/wQ9FVl2WA
bkt7sShsKtseMua+zTFDbZX80iOCwrYqdBJ6RVYhaNpqQBFQRted573ouxTonPQcT1+LV+mJ9+IZxwtO

yP7pJ+Y42+x863J3LxO71ZZoRauBtBYOJpB7rF6CAWUyfbdvo8Pbt7AVDcRZa1sNwIdcP6ozfVwc4Qg6

B7IF9bLN1Q+X0j9GrwF+bfOEcb5hmDog2MDymVm7Ym
Bv0DpV7F24H0j7Zwfg5iZ1UFx1zUfG1hjAMybPNJ+t+9SEIEiQCPhhwsdxqqPd0Inh4xCR8Y8y3Vfz80

/W5OXG/8aqxNny58KO7VGHP41OfqaxZxky52b27Kxinj9/KbDdmd9Kd+GQsAw3/U+nl2Y15mmBrGBdc3

pnve4Zv/l4IlRST/rtXNwe7g5M7zMK4iPrtEePfih2
cUxMa5R++nuvzUVwcSKcaP/vs29jK1PDtQfjRXcBMg6ZqQHxKZL+S1tfRr9XSgdVF13Jnd50HD50244Z

V4Tk2imIXCHP0eHh9OhZ2rrZe3ely/SZ4eF6/Y/1nxs7trE+P6r2tvEeNBHaNbYVwOw4GtkO1c1b4Dhb

jfDQEg0A2yGgBA92hDvjmwLdAqWPRsZ9TNs6kEo6be
arw2h5JMbzKf7lgOhiEyHMnDe1zQ15KGr1F3lgyzaviLFqcm/BLEP5e4KoO2OdM/2zUhB4sC55SwSm1e

UP9XD2gOcUCwRP/Xzwk31GJ3X2uwIQB33EokDa2JO6ko871gNo8iN+54K2lwtlGhE/7qWzBrGKfGALVK

SjJdywQ9GcQbDTySjtXkuE3gHvmzwsTD3NPDpL63Ug
fIFPSLl/5lBT4QyZA0zD1C36pwHcXuwG4+4Fx5NS32FDwBGAlnpIxPwaevwfCo4k//MfFmSzKxpyKPQ8

pahSKcK8Z5OAbbOz9HX/a+LGuB1rRNXPP3oqElF52B9BB9XC5rOsu+GjH8AtGuFy+D/11Br9KeEAA1RC

LSklKfkJpk4kfZabApIv3NFU6+xWii7wg9d9/XWbGJ
HdRT1TbY3OKQI+0tIQvV7QlZt2CsUxYXuJykqm9jc7OyoVt4W/6Nj7lTK+EXfvlXWOXmFSpy6G/hRv3B

gcMTeWAQVj4llGBAGQvs/X3FTMK7cwpRpG9kVA7RyT3AqEyixyTmdNpMvuKU/NXQ3hnuWxvPh3FzDBeS

itcO8mEFYXq5p//JR4N977elRTx/RLoR0wT1jSlSPU
8OZfIRg5hD9uGjDErMCO6V9anYEdlnRK+T0T619p8KgHrJac0h42oMlmQhGKducwwnEw8+4tyoFwqCUp

EuPw3kfArdYQtwxQsala9EJPkg2wznUnKe9yw+5xxzLc6WtXpiY4Wv/oo9vL1OsCnUdPVbcMaREBdJPD

nIG6mdiAZ75YLTBwWfpMR3HDVD48MNquUNa64Xuo7i
jqFuHtCQqrz+8n64CbFQe4fS3wdT0ta0mxPQcXEyetc6shVmtdj1qxwfkdvr+GUx00PFRrQ/vWt86Dg3

m9W+b449tKFCkhXHihlD9jjSfF0mEY8utKIAiS3Ni3dphsnfvYjuBfVpIKrrmyCmHDaJUXk7/eUlXfrI

eXf8HK7CgLBBgq5qhpX3wt+ad8LS+bHEMkVTHqR3ze
1fCfw1uN70231r2OkSu1bt7TNM4JHq74JUNr8kFM7vaxw5bz2xzJ/O+DrIDcuymRQzh8qmHnU/G8n95r

vTjaPg0VwQeEWv9SayFTBnnuizR8xPJB8IqoyY4RhgBn+dshYeAEJQH6GUM/nEw9Biymw+vnG7QWs8cq

rK69SfEvAgBZ3OYf+onIGlWcyMmZJ3/h36BPd3sMzI
LTDW3baUTRQCRkTpZ9Mo6Z5JnY7t/y9LaNvzChPzqN6mPVxunl1DdniBwMuCfH0bSMp3UJAWK4vlh5p2

87YPd2oAEB229R0Hvqaar7DkCbUGU9b9u1Ar2IqzLLfBcEc3vUJU1XZ6ly9Kl6SnJR0HMyl/5Wf+IdAm

5CrlisLvHWTYQmZGA9P5ddl6CIyylhzon8f5FD1pwa
LuI72veeNkREvVxvSN2LbPmylb3Rv18uv5vmTEpdychY4bfGKnSjjIERFUFNB7fat3P4qKATqAc6PUKs

77LApY8fsMs0E4AvTlT3zXAGUacJnhg9SuTXM3TOVKpfD8xmMJRWXKc7flJVO9fIZGw9m2oEBGYEVGVS

Z9WqPA+i83PBrErEJmgXnT/6PsY8a6enI6HchvrEaS
vOaAQust3fm9jiQvpvEfSkXR3ep6x7V3sKoI8Ij2eOdiLUFxVHHVBJbEyJCX+YZYJTRK+FR/54KJp+7J

rTVTBxFYocuBgi1wnHuyme+Z6bLQwNNZGgulg4XJ8XyLI+po7+OkeOnHC/zQNcSyz0wZJExXklzX5Owl

gH71UABcgNVykJo3p6OdbB8YZVgDo4IAbj0mBQMMLa
oEu2muLRfX7TpDXL/X2OFznB1a95gEH09KOfRD4x0eg+syt/h7ckOV9l2PeuZA9HmuldZCLFf5PLGORY

Tjaknfqd5w++Wr9QzNk83dcdY5u8aKYK+ZhNYr4K/4KTz19pLHuOe3QmGvfryXk0gSF/Sc+qJC1A9x

SMwB1aWcGqJ66rd32Cdm2VYc0UuRIEpShPetEUDAxo
9v9Anj5nizaw5flU15h4ZkijJWzXTkp/qlbrb2hv/R6Gknt7e9pyOYwrnDkVnUxi/78Lfv8kZoQcn42g

2llm4P8XL226LGwilM+/mWNv0pq6bYfLOOxyPayxGlaSDOtWylRpnzFNMCycWBiM/waIbbNtYDPaWSO5

r8Hvkmq5AwbtH4kFF1pvaSrwE5BApyNT+Zy+M7hvaj
Yq0tLC9V4h1Ze0Q/0wUto53Goz2K93vCFMo0K1Iy4wr/fRSLNHQfiys3OqHDkhS5QBiqc7btttxZaQRS

vLDHry+qQgRd4bIYa/eSc9XHvOarXd+wA4X+3/29AHQS5na7ZqeJJQwqQybyp0pg8a4XhWEQLwPXDlxI

u2nbs/+C0A9ei4hJGW1WKp8/vUBQO6yOgHqz0hyln8
4KKBfgghAemSTcbrIz4GJjs+TWta7YkV6n5pFuNnfXKF4wHmTzJJg8MNM2YbeBb8UFjitI8An98pCkAt

QozTBUtP++k7D+ae/1Rkuu92v9WcLw3cor4XgDXXfBPMBeqv4Ew2i1B8WjtP1GQE2i56WAb9J03T7jcj

+7v8EVccEj3mFptjsXyIbk97nwcrOphLIkMVbO7/9c
mCOKkG+/tN5HuTNlhriP8eHqrkBNRkMGjLBsd4wVx2wK/xmvCqDDAQIOXLKi+Ow9MWYSG8YYZ7dCgiRC

87kKliHKwcsTO/VUDl74RrFlyfPlB20Sxd9rD+M1Y22KRsvCT++4oMssmSZftjF8o3sRwvJ7uMOXpYZX

yI3+xiGp6bAezZ9Vg6jEJMvY1K4JOagJ+hJlgRpY2Y
aKun0Hc/FzosZt/X07SLq3up/oemE/xfsNdP+z38iYq9IVIFh6QQ+nGEoX8CNfAdJZjAFOx91k0EOtP2

6CexAxL48yncKAApS6fMk9ErFsExj+J4i5guWH+aanY8ZPU8bUEwzoGwBcOG+zlkfj+EIMqO6g30iwI3

JRMUMaUSzgQ0YBY+v1YWsg/UlAlUFTVrPf/Nathalia//as
5qtmUcUC5ZpWzINMWc10qUtwA9xvf0N0SVjqshm2wXBlfTtDXreJ2KZx1y2AV36F41cx1J/Fh/YVMco+

xd6ntaCjbvDDIFhgPulAc4DBydAI3Q2zo3zz+KSI0ZXZUSKqb0Gss2ATSFBNYHH3q+ZVSveHncLxTZuN

0DP1fkwSD2qOW9JD48yOvPMDumMYEkOAb5Ehw+js03
NzMzAM/VBjsvv/pFOuaCYLSagBSgiSvlyuNVwCsACyWdOwYpUGxFksLQUvQQKKi5kClHvssCIyv/WO96

Fbb8Hhfgg/xL8rSwXLvOjl322jq/r6tNTxG7wJX2uCbicKek2ucVbCfyxPmUm+6Ra4x8Y663PAW4zFui

b6zV6kbomYJwCdRoiPQUh6OEJq0GwoJ/VO6MD9ojEX
Lb9QDImoNMYwC22sNuC6NyfWJYIuSU2PFhTkv+gg3a2oGmqwG5oLENer8CTfnHCFHL5h3Y/JORKwROB8

sfOY/If2oki3gjspJ8P77/RMietbSHSuZ9s7Yuotup9gJ1uPVkQXvL7SnaX7eJHRFS+WZ9azowytfXG2

W+vcAoMK1REEeODGcQWGZwNKAuAFS9R86mNBzCTe0y
Ma7q/98dkiEfB844AJzJKr24+xW11PT+JU1yXheJqicy6MLjg0o0AxZhKanQMldZfPL0xP8pc8/31mkq

Wuu8feQpFskMEsmrK8zRHuMcD4jcfpqtqtX2DTy0hyZzLo3xy3lBPnYh9HDE9BS7C3E4IUnjAdYuv2NM

V76UYtg947p80w0yVa8aCwVwqfE8vSX/bB/gY3CG4a
FzDeURyoMCmwmfgZ78SF5+2BWBRHXpj/P/RaXgfJQnvrhRG6m5JxMEFPL0uIzoKCcgAfdzfb5hSZkdU0

FP/9sS1/7NsdCTFGWBhxEv4HMXy/6N70nqzv2uxizUToXhPhMhoy+hJ4+L+5j7/kQ2E8BlxENoZG7fxt

xCRbjN+aKcTBaJ+Y/BgwqFURXz02DOAJsC2eipKPII
CIReiB3S+mgd5xdox/xsDeBoxPusFFwlB8rTwO9Nq2zvVc80tGsWUOBLUsyKSh//Mb6wpXt/G94qiMB7

Q01sJ4Ld1cWjsUG66oTb/WWWNU/HiggW9z+WNz8jwk7Ba+CyfYXHs5wNbtDrUUhdn3p4eJzGz2FhBJ8D

MAfvUByDgG6psr8d64K/+N3DXn0IPZaV9D6cvVTYIp
twaMtgqnpXmv0EPgnC3mM9spnXixPxswEr4o0EeMBy+WrZKOwxT8y5I0RSb+CT6jUrgyi2z8RQeI4soS

077DaXsAuIOXaqRRSbm6GyPJR3k6X5ZV/AqjWTdp48bBlukeWd9jIRrpWI6Zh0hYty7s/TcXttCSBXIA

qMvtHZP/57Q52Hfn9/aKOS5Q8v0PCqQxue9Erqv+Ok
vtJnJoOFDxpKw2Yd8mrjvRlqcUouDa4eeimrRigMN7HnNJ9YzOjPnEV9y/zfFYzxc1USbrhdH3jFfvvO

XyonFcV5ooXYb8qMxvQ/hUR5EU5xZ2qRvxg3wsC1GCv/yqK+UyPGWeTellLKsz/xmQ7Cr9IOBkP+XPcv

6oJcoZabOgzGyxi8YA/J8/k+ViXP4yh1Ayp+lpzwpE
HcErfBPmxTU4WeNIeC9nzYt30Msbl/GPBPFtq+Gv5her81+vWGVGyVRKCRYanlzfgZfA139wxEg5ut1o

91Lj+Z2VxHbrpPn95/6E22ca5kk9PmQWXYEt2cNdP5dE2exzFYIKdEv87A6BiMLfrl7+FRXAH401H1xv

GqlZCK/jk0k1czCGbWb/buobGvi+5gdRn43n4DrbAm
rYnDW9NdMdZUfr53/S+O8ganbGAwkl5L1ZKz5C3MUUN5pYXbUMenexokkPXEKtHyw1nWgGWM6s9Lu/uB

EirdVeJ2Bb3oJHlSopBx8Fj3nH9y95go+GKc0syZmK7LXVX7YJ9V1MV/c/uRzBfnkr0tb5Rcb+fFt2Gi

JYzhTRiOFbfNByb7ghP33v0eRfJfl62889JJ5cn1V7
sT7SV8llMniofdh+/x0uezDHvZosPD3/LVAKjVZ257NVO+YHSza45NAcpVUqG2UJCCnAscm6o8Z2ujB4

wpFtKNu02cpuqIYnR0afBHkY46x1WregH6HfpBgE+qpNBvrhxE7T7dm2Wv+rSRz1vk1NUFCPX9N3H7Ok

OoJktv6PYiIv4ln/X9FKZfW3eAAf1h1ApREf0oF/3A
/GZOw0YOS5G+NGsVMxURwTq2d8LjvKsHoB5d1NZnXZc/f0MTKADGVN4svUilIPdx1uhQohNeYQKuy2Z6

C+c6s7d8AucuNKFN1VzdApzSslH+uzZve/xBBCdpAotzDlRkR+nM5lo9ICSh57T9rsOCaExrvuaxLAzU

gyQuDe6znWU6IPf+DRmbL5uclFhvuG1udYiuuIJiaf
kDUQhbs9V27Wff67Nf7YsQ0RQke3la/ycD6f/tNLZOOSjv0kORXWOqT2B3lNhoFWmUuNHFUDorPFe089

q2WlVtGDcOjBhWgPnEZ8hdqkyW6M8dXEUKBrAxxCwlFElVIsAmOltlf4r+wguPT2PsibPv3GBYTlxM0l

RMOCOvmkZwKIxFF3qFtXK+nvao155EJ/60/XAD3lvr
ZYw80J56Yphu4he84OFvqE7JutEKKcdx1ziHJR3tUkRcroYn9PXEpkQzXkOvnq6f89Xc7spnkKXlwJhw

DmwOAHZq5hjfBD1TUrKWde+HdMK6S0p8gnR3oUhpfIsi9A1RIdTehWbYIVFTzgVIAZsKpCPYGhNp2/X0

3B99GAP1C28mN3Z0x91F1Ja+OcEDpQREcLnIFy3/WX
xYpGwu83d+tnl7mqVLf/6bdDQ18pegXYB9o+Wl9HSI6SViUEP8FjyYn7Y19WO8m5CPi7CGZGU8sDgF84

V+zaC4aJ8rwW6e5AKexdQMEY0FkhtwiThyfImXUeRi6j1KO9QOrI/joq3EUTZEyfRCmlQnVwQRylgH9G

kt9202wR2dET8421Vegw52xzbJN461WJNhh8bv+Ses
nDiFNVSia8hc83IjI1ErLGLd+VguIa7g10qv6yGwjenIXY0n8HdvulbyaEYmxr/tymgxxpHz+IPSqH4Y

45P+YPmXnUu9g+FZEmy12lGLfA+MGU3xsXnQrXaEguDrnmuDY/6okjON7F92s4PV4n6XvsudOl1mJFMu

/hoRUl/1sl//sGn40R0wE9cRXjq68J8O8W1MoLkVTx
3wtKOxUgYvWz/h1KrAy8i7iJb2GexJUxpImnya2HoEexULB66G33rxypMXXqvkjpGnKsjK6MS3DohTKO

hD3PMtDOrMv6xdE+c207HCKusrYXB1l4E4oEasQSEeCP052c/yZg9VajY4t5lGaGX2Z0drxYxPpnl/Ii

x8YQPABNZf8ATxdmpgxB0cowbcpOXb442HgJRypWJF
JwZrF0enbip6H6RORDiwhb7rXNpRhPaAtvGpLPd7db43GUM+eRYKdpcTeZg6JS55F55GfJQVJj2o2Cor

rd+TRQMt7QMAHOEzSmSX3AHyrTIIbq46Om83yhofo9ybQNobsxj0M1O7PF7NwE3eZMZ1FVw7yga5i9YT

lvH6QG9+lRggnuHAkUQKolxrJ06kSRyiRVEZ/gjT01
Y0sNaZuZPGiMC4iVrwWKcqtzGzfHBiRDMSmSb50USgN+xsVlvj4Qx8ZRwzpdBVAdp+DB/1g6LGrRMCRD

CtwFYA5mihX8Zn8T4iN2IqEW0uzDgidRmJ9TQW1KGWa+yspyXro4vQRt1O8G0mh2t2SsHQOdsKwtD1T2

GYx/rMOAgMiN/kCEi1j6XzyTCy9d6DYnb6yHPN/5bw
Nj9ga2tuQpQ5J+OIuLybZLKIVhYHJ4l0Na5RU9qwLM8LDE+d1tPl/92uEd6U/UHb4M8qj5brlKh9bZ8B

jeOD1I1smAUyrlb4RcBc7hn0mgf3LgxQTp5N0u8qdpWVTugtZSzY8SQKkk8W13NMtjHk+seRRgR/2+qE

xauz+SNhEirSgXWyUj8hV1T806qxEa3OKlqoQOODDC
IPVzS0og0jtO1eTZ/PC9NLwfSWnNSy+MfWBZQUzNXf6fGYVTLccNRzwrohcxaSVhYC8Qav/3Xs56ogXT

AMHxo+bMiAB3JswfXSh9+e7NQcKQSrSerGmUFZOb5dYRq/fjHWbjvLiu1oB/ew4jtTJKbvto3WphR0Pf

8sx0uFbeI20rE5IKnzqFSGWMs6jTTp07fbgTlrRdDq
COtx+iI//aocgHTKSzvm4A55Yulstbvk8+OI+31GOULEPzpQK0vzZ/nstJXyQ7p2Su9OxBnQ33TcRbsb

loovwlMC7V9O06p2xYrUofrsp4p0S1ZAXHwSMc88b3cIidLIsCPHYlRcqZHZVSuwueo2nU+GhGJhj96a

KOX9tXkeTLxd99Ug0ZhhKPJ+n6seL0/rCmstj/V/CR
JOGKLtKR2+8dnbjurwAZlhvIqvefMoC1Ze9tRQqw8f4xr8Yh/kFKuGleuB2mWFAncrpZYwb3KwEiguEQ

IjHFGvl/4R2X+1VWYnEUSjlrm3DRDtSN7GJ7edrO+F1zaTPaFO35LahBulfEbnmeuxp8kRAxBfKL52k8

8wubUR2emNiHzw4WuWBmINGlKLxD9VBwk1mv3rRSD0
ABetuIbhkNW04V6goePuvXmK8VvYqLtQvVg60d3+EBoGVYBH5Yh3+Sx5X0DA5hw7IPPvi9nXbhWXQ8rf

WlrKUKmMMWG6uI8ap1X4tEkprbJY5TcSfDvZhkcGplZDqGefrgc1m50EVU68CU7HeoaL2msqCcoHTQ5E

CpaeM+q/5+Ex/D1KVOmXqY6xVDdurFITKfxWIdAk6d
vXDM4T3EwclDiPAc/xjC9E5WDVzjwoFNjkxHtIlp2dfXMa7EYuceE/NRhx/hPs2Gf6bbHsYOidxllRJJ

uk467VVT+A0GklQZ3RU0On+TImKkMFFXGJsXEZZfbfjdq+qjSj2h/sZaKcFiqCUeMiJJEvbkRccQWCYz

u+FhTMqCERiClDgQYW6aTZUc7ebVWRggEODmD0nRGg
OVQMOUfogBpPfEWWQl6G6WJmQoZ2wjEFp/xif++F4xTGZhPP+70Wb2TW2S6ZAH9ZYJbjgjuCONWQJl1R

5jCR9C5GA3+1x1EwYICQvPMVl5PHa6DzN0pV7MUbpxW+6lg3f3dN1JxYZIalcpi0Tu+4WC4dyLUnvIDS

gEAewKaWcoGlXtSri1T1YmXMMuJpJ1HqsS4ORaZ3OB
FXAi3/S4Mq71BPrC3HwSOEAu/nwDSF0HuBU9Zp7LrlcPm+856XohSXwUWhKrJZC2Gm2yfXq6xpXZ5GOg

oSvTjsjN6/XVntcXEA4d1NDRH/LFgi3Ag18kWL+j26HoA+y5rzjuLpmjp1Jvr31SE+r3r+GngiAJ4297

rxGg2+z6jWh/d6ehPfwRW89lSaOxPRjTWztm39XfFL
OT6PA4rk0xjiEyGI+8YLUA9AC5faH0fZ5qngGBzE0sf8cXf+x5jLHKhEmmBD3WdXa3CdKRw44A9SytPR

xYF49tPUwfgAmZJjOre77ntWvP9kxyulaXNx41JDrS6ikrnbPhiT5TBJuzkaALQDlzvBqePSgAenYICQ

UDwASRGxg3uP3vBUtXCBpXw9DB3VtbPjdOpIV1ofj5
QgcHpgjEPxDZMqaNfQ0g9JxW1JYFQCA/MrWcIE9+rOpnMf/jLCjTz+nb2cE/YQJZaa947+ldB4QXhha/

5sqq/4E9RmIoD1ETT8ofdNj3JItZL5hX4l/m9xkv7PEYuEuMigW/ISVOSM8QGqxyPQvOhYVN2M/LXoIJ

kxPbHzbn2EZudvmZ567VtghMN4HI8ApcshOGLlzSjK
/qBFFLsFGSBezhk+ROjj1kKbGJqhUTnpwha0kB5odT1Xl3ITlMHhlYP6JnBwOWrD4PzPGUUNSBBLUhtt

zGX6qEk5bbsemdXkrnx3yfuFNrsa/qnJmreBfNtm10BaXjA8yrGlMZ3wLkkfyDsZAGOq2/LguL3jou+W

hcsKEi6A5D4ZDEYVs6chXxVL9n6rRk4+ceOy96kCHG
v3yfJBhdgc39Ftz2hknJzMB74Z4/riuR7fvoghvJ1mb4ui/yMh+V7W6W4f8Tnazx7zh2zBPjxP3IVGwO

R47WUEZ6JqHyKu9VVqpeQXMQbuQukNn5UO3ptmD8sHV+FsD7FbXkGNHYaJWVt9Zz4HR7BrzwNm6udmRp

k6/ah9GZJDpMvy8p7lbmvHlUwvuqx6AVoeFfldPvW2
4MMEnRnLvPzBgcrScgcHh0h3C/jIqjcP91wt4xhLLqhRfWy3mWppBRZnWTQ8Rf43SQ8Ts0GegZMh42k0

HXaR+YLfoElcqC0TaY7KZfKx3x1EiLBFjmSsPIbihIoGrggsdXC9riEQkAfla86c+xr1LTmphpZTNZml

aTdRFJnCKIzOgdBL0HBTG3wl7R1VClp6gcAwnVTta9
LwHadigpfFya7qskirgjcp8sFLyaJrhvRxoWBwKYNrg3bc4cV+2QSf3qJUlT8ktSdFbsuiOkWvVWN5uB

3mot4eEg7GQF52vho8JqoOb9KO5bI5j0zeKhiDAEahzMepvrb7TXfURjEcsp7KcMrB4zEkDVkdkZhXNP

cDphkEJsoDONILSjAjfVT+ZJCEiC36UJZHNQkjkmel
Wx3Npd3uTttxy/d4whtp8Gsiappy27iF+qZzzgjr2kl2hy8oG9sI1NoZeELR9qb6ZWiuSL1HGFVeY0AT

rdGTBnkEtqDbQZXOCej1j4Kogpe9IPwdmoL9Ck3J1lqMVEl8NZIbBpWNn6JTn3mBQUZ1xvyHqEQFixf9

0Hf29BRMTDMDZ7/EeGOXDX2A/uypyb5iKPtcruB2Fe
gaMHHI/eL0A+omShrAo9tTqK8ZaZP39Yg3/qQoSokpXA7CeZCDzi+LE+80vIrs1wIAD6L9WLqYzVwoq6

yNj50KAY+tFi4oFVAgEB1bUQqwDwVzrFuNE8wQ2hjg+ybPxGl1kvxQLvyVGLxUNWNsbDx1SBrCEMwOQY

zYxrFFLpcvzPacRQzkNHDb6R6pB1m8+Uo4X8kQssmG
iDf+Ar1+l52vcG16kehy65vv7t42nNwb8pYbDs9GKG5vuPX+/tFXl08qMz/EhzGJj4nUtkWfJvraBLcR

Y9xIfnG6rCznE4Mb3uKdYsHmeH6ebSFUhdccZ683JRYje2P+DKjw4rAKzPX9eGn4hqaQ70zRxoJFdKk/

Z07Ulp2EJ/Z9IAgkZCL7JTIgm4YethoNKOZkD4pLcL
6X+OOk5RV8lYVPd8lXdsx3WGU8KB6iY7534Tgk8fFCLhgiymTlF/0fDG49AffBpa5zBg2aD130HTr7FA

cJtvbtCl7RBvK4u1yBUl6g4LgYXUh3BL0++dlF/KStemI1DSKneG9q+Kii4N3qkx9lmdr4L6PawHBCoj

XrXaO0dGd8DYBgXUlgYCZtOkKp8fZ+0DxAdhhdv9XQ
UaSESA2cf7n0mej/3mCqr8jpx7f/6rMAg1vXjTQkhcmLG0ZIxkv3uGUde998CeHMnyoga67dt4dFPy3S

hKcYklfHd6Yy4hF3C1VW8yksX9V8rj+SYFb7FRpRTam/+Q86imAzaN03dxmhXoJcDset5zNFcmH/ilSh

ALpH27oD1Z0KP/Ltp2cYaB07uZ80ukI7SJeZfQ8yss
Ba9cqpZtrBXtggVbwsdj8QxI9SDnDM20o9pjTgM98YB+8q0x9LVqzHvfIyGsj27dU57bqARKqDYlh0Ai

o5fGP0iWCm/lUZnsdPOeCBbhScUTqzMWWJwXbQ87xeYlvqNDbjZB/ZVn2+LMHOmamyNU7askrBYgCEZF

bNSd9YAyPeSm6qYU4V/iIrguJCDoR7tXgbi79dSVwW
5ZB6lfgqvArF5RCjQWinj+rsPUjx7bGqDGN8WaYKVuYWDIj7exZPebjcwvwwilMGSmf5m8HveoLSvDwC

/wwN0vvoErpH/8KfEkxlCgrKVUq21GsZqcAwnhctB49057vYVgJKV8foVylWWJg7kNOTY/rNuUWCoRJQ

OcQCSWFNysXyTlvTJ4aZfRwzTT474RQcvuJBs7w7+d
v7kYf+6MdHcXDuQlzfsUOcJ1Ao7DjAU2F9k+ZyBW1+Iw6W1lzIZBi+BFf0N0GCTZ4SWkCQijzxYv5Nx0

gzFS8Yn2t6UlHwJgCAhjxbUjVww6MpcHgt3ZQJkunMFuL/A29df8dnyJL3+hmc0luDJUHDnFA/AJjcoM

1VTGiQwU9JSQUixjsGfMD2/KV0rXXdPr8sFsQGIXYX
4MZ2iDaduO6sQlMoW/JOoCWqL8E0JxjChZVrbtzVrvZYGyA0DISUWLxI67oy84o09QzE3bqy+/l62pvl

v40rA0n5hLR4Co1KYuLZfUaCC6I5AcEsW08nRzBqtpiq9rzkOmmhK95pf7vJiWCXE6m1/OOvoP892AL/

hDnUBaNXOPIA+I2ggJqiU8EBC9vODIP8/Yt3QP4bVe
fYIZA66DogjjDK58vWxQKgqRqYjq/hdEe06/4CjYrzHyT6q8uY/Pud+uQfZ7yQSxebD7E2+CChCr0Gn/

0oY/b+q6H+4coeZPpJyk1edreKglr2Pzj5zksGGFMxi5V/GyoxNrU2glwHonEI8QBoE9zen1ew5ZIk4b

t7X3988/Cvhe/Gl6BQCGw6FJwEhs49p6eXteNqDmBe
8Sq7NB6zUevaT93tY+65HNJ6gQvw+zcMfth6n5jwj13ymiSi7vLFBB/nGr/mffH39+u/AyeC8w2HqWGN

fTbN6Ry1Mq0EGzAc+Dr7a21/Brke1eZKEJCfXz6smaGsuUA4ml7QpAuIUEdGulvxN2EuLocPluuZC/5v

81vHM5ycAbtwEThQSCp1gGLk2aetB144iu6rH7XCmZ
alUa7fPpzDJ53b5XBDUigiCnFLBmbn1dqyPyOPet3453wmSQLK7aP2yfUW8caSygcJJswIZyc6jzizmy

35bHaNlDp+FJ+5bzN8YpziGf/CLe1MdMWt3UHIx1R+ckzCbSgYKx2BuMfV1oc80BC+ZTBwCljeh/sHu3

a7U+WimuBDTo/6XIzAim4v+vFB6+aYv8JYTY1v0Axb
lF2Bdn1fGd87vjfkx8BNJ/X+c0BIcK3yGNdReNhi1T2vm7ww7FEo3ht7l/sfXKsAM9nCQqMdAKUH5kHD

2WljPG76BQfiCh2vs6cV3wsmAOr/cKIh6Q5GOwKSfoY/DXp0ddW89hvR5BM50idJnR4XWk3xKiYEu56q

t5iyu801h8dNCkd9G+HTrpxk6sLOlB1YbLnDivosbf
WFlaoLZz6chK28wITijR5Fz7qzc7T1/RkmB4KsConIIo/9zj8tiZC0Didh1F5i1m9qhALCtNfRyCedrO

dSVTCmA9DC3eFYYo6W829pFpvCo5mIIZdcw79NScny69hPD4Rmbz6/JYVHeepzxD5/unoA+TIrvjig80

1f52Gk+q6roaA0YwonM+JPJ5H/YY5wFxaRivtQ7Cfo
i5DYTJu5rxG38wM9a3tI6q3EBdvUIhutXEsRVXEquXE3hCCaPtJw/+wTyayYxnqlywUaHQI/A2QIqRQb

DsDeS9l8E7jbDe4nK0y5WJvpGyukSpAtMx35tdlQISuKQG5T3W9HBOTE+++BJHhUWrlPpbW4cgohYLXR

BxGQEkJEeu+051dTKFKmZRTIhaVYfA5cuTKPJFYFBa
/3eluIeq2A7g5/m0720mbZd67rr3/rhoaf44kRXGA6eLbUpV1XiRUVcSLV/4mKl6YZ50fIK+fHLdHIXG

4rY7ESQ6tC9a7iM4CJHeD0CfzT2dK7hdIkGsGHPVN1L+OX07Fk3YJpR0Da9HtXKCC/gSw71UrHDvXTKX

Gu4JqXjDJAAZ9jEOOxBjJnejnbYSBREQAw6GZAXqJD
QsSHW4i0P/ZspW1u1hRhkaUGZAJgQjJxxLGQz2UmKamrfgSoCX4T77a3EBv1/R035bMOBOjNlAFWMgUO

5bq0Xrs/qFsTIDz5kdL/LtJ+fBdJceQ8T42dXY8yf4NSnvSJ1EilVcDNwHLQtmyp4ZvydGjxrPeIOwzT

jYJw51DmebfeF92Ilq8ytJ515x5Wc+fAJfm7MirVD5
TFCA+1TRv9LytLWyeOR7ahgR0KkyiTi1op0r8qUQnWupOWo4/5mfB3mWM/sNeLessh9bftHgUalkIcAF

hqjASBDcg2j0Hdh6oznwgi7Vhh8fkrptiPoXqFiW1o9t00jQfbazGZeSCKdBHaJKLjwhcIVOo1pzpA4+

N7fHZ+tQZwrmQfepgvT4blhsCTdGNt5xL/pQ27CWq+
u9Dn9Sq6agZjy1G5FglHIwvgY+hkAgUAJHySB5fmyXceYmWCQN+gVKQGUVA7HXZ+IPKbwZmQTVz/wIOn

OJUbH4/jJVoEPQoRjNRcIZpceIZvMieM4HCuXsovG1PjgP4gDzdvXkGpDKoGsfzxXftUOd+UsTC/uDTM

t+JnZyyM4/OMxjKLXogT4HoRyuSDNT1RNVR78YHLMi
idF1p0JRucp7GR7NIbDO5cGgM1IvMIwn5uSfpF+vZ7kwlxWKKeoRDK3+pPODdsVJrcb7pLr+n3+g97OM

4HIjBuT6jXK+fyxHGIm35ZEabhEh4llCxteplAwWyQrB1u/Dav48HU0SLEYQNnyhGu/f2wye6T5sydVY

oEQcx5VbiKVLXXj8yu8bpuGx8+7Ff0rtuJ5gtNbk3l
9b3d7Q17U1g7NgWltWPy14ZLm7HB6ZXgqzha/xn412IYWh6gH6Ab96mQvnpkgPnoouTKqMZ6Q6S4E0+5

M/QUWUC+Uy9vvOBfeubNgzUWtCqk6giLRGkMTJNulmFmd2JbzvAsdYYvtOJQFOi+IGj0OLrZtZnUzmYT

9p+wuhoVuh4TVLRCfjRyt8RHUVrA7DwFk2aL/q059z
5JjkYIY64eQ4BAkXemVJPxgrZVssv8CInm9mVMg4TwxIkl9duC3IdqtJRYwchic8J0b/knAmJcns1cva

sfvdBSt+UkvEbzTuNQIdpLFH8E9xcohp2ahp9cQGkbIdtRohQ4iX6/lDyYo7KFoNUl+rKApSq3RgZK98

YrK6IQanvHF1L8HMfPlwsXx4WTOAVQ8I7AGiwL2Tc4
R1fUgdn/5XR6kItxHeDy+2gSFpPEBIj2TojctaYG+0py8k6YJLB3kqA2TfjAEI66kKrYheULt1tyAEDk

0EhszIGztm6wbyYF5W2AxxfjzekgoCfgOWB8kAWsBYc+rekn9i7FzN5X3eHqe068XICr3UIBc2pTbq0z

poDYYJ9XbiO5yrGd1wgLhmwv+fKxvstCiWTagbCiBr
Ro1ll7zTqnv+ClxNmw3GhjzFDv6mxU8yRn79tK31JGfCgmyV4IlEhe686gD/ROpD963/4JfWYdliQJhT

PtbUdafZY9k8viABiK2P+XGsEVOz/TNjtKAutzz1LxetLLlPeZQzcfBSDioAHpfr0eN1lgQAIZ64M5L3

4g2m2SVVin6fe5qSFjvy/hjPef06epjXMoTViZE1LC
6415i1m2dxfq/LpiOFq9UMBzpxJG9sgND2UwBt3wD+6W8NtZ54HtWmK/N7RsdkUQboxq3iT5fo5OXBgW

ZjCv+yXr4+fxYPONV0NJq57xmPVRUuQFBLzasvMQsS7IsAwjyR4vAVkYcg/bWTjT/zh/lD7UGLrhU+F1

LWQT2Jrz4HlXYvl6pU2mVbXL13bxbzMWWuAPqpKUKU
tIvu6LJdcR1ZjJZVUtepS1T/wxTiN4JjLRxfAwTAv0iixLzWqmONztwiV3UQq1NASl6ZQKKyipLiprs2

UBRA0CN9JcKhLCUuRtC6Hx+DcluX/qT2J1GIWVL6dy6xIOderU0uVgs3dUy8OkomSGf8lNWy9P/q8Jj2

AMoHY+IhzBgd/13arWAyVBEHL3n85EXaje0lIZ5RbK
qNlk+DzB70Md1fhe8OxdfkZ541d3t9N00gWfF64l8ogh2xvWy6wd5mJYaT976e9OvdEWlIXIgsclsWyd

Jv2c1GqfyI3izbhMn0qZvYGHGBoaEDRjJhKiKEifdplMOVWdgua7Nepugkr2iZnyYTCDcHlFf3OXrZx3

8IQLWp9g/tZ1Wl66ihMwUNuk27ldI+P3Fy8NyLKlA3
DV3IJI9FB/7vr6M1gkVHQWanRYzaX5PDInixxiya6DP7cnFIL/xR35Hn8/eV1Va+YDNKgeINQPH4Tjmw

oxc+yZVW3Zi1bQVb9aR+N9F8M5RfIzl0mX9SkJlkJ5pV1DcHwoGHsJeSxGXdMl2Ii+VAduzJkS95+5P/

A7aDq+MxHqIGFNLRakyLUvdwUiKPFDKpXVqgvHHfYG
9u705pn2b9fe9ucjFj20umPyX6e2/M51IvmlyU0/yShkAIJty8dz/5knGpI8G2LsKi5tHaBitanCBPvx

Fp1dEF32a9QhCu7EL0reIJIuRa1NlEqPkBEMEFFIzs9lBeOnfmIk/ez2+Mqmac0ikrpGPykeHlevgYsG

y6129iUt0PvAMvK3yTNZ6X5/lZo0AvaQOVIzO7HzoP
/57+P6CsOwJ3zusvz5KZi0EUHFrukvB+W1DnbfgMIFYZCqJYPsecS26g8Tg/9f/uVf/uVf/uVf/uX/hk

GOTAfHRgbg2tJWRWTgfaxDrIJJIPBuHPNWengX96x44RK8Xb81FXA2iV4b3fHxxJ9qRuxI4aDBu3xzs9

8BFg3XnHrE0v840rl8Nqzs0ywgGrBkupcof2eeMl8+
gJy/kNEaMN2JeWYr4xA0K0e3dJrRm6bJCMDgxYze6nIlDU3MqCkDVL64VAJfhUGCfvX3KUY26qXV5nLK

iKqN+mIc02/7A+spP0q0qKoX4+KDzCt52TBTeUcoary7h1UBa7pS8NhyvT+WkM7EToK9A6TCYnIFSp8K

Ie+z986us7WSInEwaM2HPqG+WyJXDELv9ZIF7YnJVk
ah4wd5C277uSgwm8wM8VKesyLk68H9FZb+oq3/uPTAWzfdZPNZTSAPRAOrVmTPzAqIQyUaV26qBJJMFd

zGiEz6D0K8I2SPAsjF6m0TeSS9h3bZcx6GXmnPHeiQkxr9W/oaqMH2s6pj1IVyHx0UEQiamW/xodlBDq

o4r7ai5IK1rpKTIiLSbuP2yaCqczSM89B03dxSjcSc
UWOv0tcyQd63STfeXLdaa0i7TgxliEew3DQygQwi+enSud+i48lEbRWceJsz1HxmBjckEUnfF8dVDVJ3

J916ix+6IpRYXkoyJ/qTzacpMUHfHPskFKOB5ojtl4qxaJxtQzA981IC2wjnu8VIyls1Jzdj2awAAoxV

uGZW/0u1tLNzNER1iUEkfkZOWQ2kngj18EXHAWCsUh
NTQRIlIpslBKL9fDM8mx1SAXNR6iC27/DzzDCxPJnZeyj5X+h2AnCKDq7wQxUxXyAzri3/58Z1NbbhIu

fEnMRnHDV/yc+ikIAKYd+3FNu3RpC5cxfGegtwJt6PBatS1sdzXZdTcBYFwRd+1OsLLwArfDq5yN15rX

QdExna39NDICw81d6JLf88S0dpW+Fo59BuLeHCoEIr
PvvYB5AYKKTt6w9UQ4OoG21EF5bs6DIb5/QG9+AVcdCQkid9AsBl/SWIDrDDevztZPI2pTgNY33i3Zeq

29SVwZ5yRjFC+Mi486lZ328Cnkyu82Ngdb8rvdVIN/pytTpS8Rrlu749WPdjQM+cz++li3FSNaCQ+EhE

M1+wTaTPatMY6iicSMAd3hITqfCnBW4+NtpmUHDH72
uilsTtF/5ewlapO8M+mhzNBuZdZ6X2HnEtmfuiL5abyQy8b+WNzB1mXz0KcT/dYjmXSjExtiFLDk9ORF

khfgijyXGIZSMbYgJv1lmmE4z1tJ68hPg7nkj54AzGDb7ncHT+sxAqmxy+81mRet8hMcba1P7LWXqBgH

asZTWSy5ZjMXR2Le26eSDndMDoE9/F67WdAoKwgQ2D
rnDHcuk/a0S27qs5LnQD34nGKE1+78A16yK3qCcIz9Y8yRTIzqs83xD0yzCld7cScKSkk6Wzz8w3g3JH

mV69/8bZKh/0acwjVhHMb6Wbfa1VbsBAvyMS/tQZW6wfZsPP8UpN6PXOkzqwebqbdyaCMTB8fEx73ug1

QqckWrjjOW/k1XACWINlyfBGcjbR/NeBssV5YJwrvh
whPB3LJ4TdfOvn8hBndwxWJxnp8D7/+iXTqfPsIJKSP9eDZVAcFDwYXaGvdvnp0XZ5VtYHb2pWtNJl7M

VyYcdG00b3N25IDiinhdWuPVXJ9BUXQcwlPfyUDiV5Zjo+vW69B4lOxDePYj+Lf/83cJ45gll8o10uah

K5UI2knVwmpaoEchtWBhmhgXT1AvYuN3CFXeTqyx2x
6a2oTxb36+p3VThqhL0KpmzNo9l4Vo0tads1bxxRrcEy4dca/ttx1nFa9SMaokU49sQwyoHPt/UhtuG7

/NV2ROlqT5wkUUbGBVDKFi2EOpgwpBtruGwp9qU3dL+ckeZWgARoJTvN2bAHKJq3NCykp2es9RXQmQKP

Ny5ZIz7JVSp6o22gZujub3G6j3L8G5w5CguHMpoZzc
4b6M8XZjAVdoY3beV0ZaQBIbzfn7lbWTpqNlWkzb4C+4cbjhKdS3SLxBE9fXgIry5RPUvszTPtrXI+vR

LoEq0C5WrTJ/+kJdPCXW899Pns+AaQl82+MvdJ+DPKy4GudSp4O4793S7CHBSD/5a2dfh1ImcMsoqAc8

TFUHaTJ9sQB7sz5qQDMC90vyL0RykdBqAsa2FO/k5r
5L53WIKSqaWyqGZ1gwgoLQWMXuEVGVey8hRJklkFyU7cxCNBf5N2nukru9Wfw99J164gsHxtm3gTs45L

H617kLLszI9o8lzwTGnUT1Sl030n+zuoEkpR08vZsOkeHW+3m0/M4KTm4UvvFYYjPEbmiq/U9BrtGGS4

3T7W5RiuM9jEqoxAdrX/jsLunN7QZMyuBJlt+4/ZH4
pVGZbhak6YA8+bqtorV/Sz+/PLQ1KhR2fNSxE3Ol0vieJGB+YsG8qRVeoTuyPz/1K1QN+pIFXSInvRfI

ko4IEeTolknezVJf4bjzwJCaU7YuyghYbXZ6ufBC9X+aFgmv3entAk3/dLnv9uNU2BJ7OT+WwoXNrSPA

R9cnWwAKtsamgiSboJBEbM+v5zjYcwP6yLLPzHD79v
4qj25r6cdT0TY923M1ad3ldxWCNep7hakjcyClPS4A4PCzaoq4dvth2y8M0EdfSBsfLEeF4Y5Z7Ongju

WMtlQj7AJCfB+1rwq2horVgFrPiqtujdX0RVlNomeFVaLlUlJ5md2e7+6ux0W4P+Le0/3eDzXqpYffAv

TURfhQhhITYEAH6apRozvZqcMchlCscthZvjex/jWc
09M4SAxBXMl7UMLwXkNUHXcyc70QXEXRHDC/JQNVDsdDsfkv+aepeR0Fr1nnwAhou0yGd9c6hDifWVmJ

MLETY1FmXu1N6ZziOw4zVIGo9Ux4kONKoGoiJ39ZFpF2Z4xkrW7iNTNXr4mInSx+fFIPdgM9kJGdFbrA

n2u0icx7UXg3kwvZKtEw2Y8Q2RBw/J0Vk/1dUompYU
qjbIjmNj4ObqlTC7C5U2eBbGaA3SGpEvmjp/LrK9ZQfzNVPDT/71zkrSXVm+yyLI4bJ07lyzXyTtcKyY

l1+gtRXdGOrIMGlklrd4KWchEkJGBddu03uxDi7tzN7yoXQwpYClNYkZOchqHCltIHCNteBz+kC4ARxD

XUkivuuHAzRbsH4FoGaRWrOhkBYVd1IoLneQONfvHw
2i97SMzw9gFb/Sn/iR6GYdUk27L4nQ+qVEhNovccvQeapAiwLYbn35WaAvYKkLTxFcBXv6K2TSQKjQIS

nKufgiaiLpxekAnx3VA8lDHvuStSrUbGBirK7qcdFCPB1YoD1kj7P9rf739efw8jxXpkReV7vm+KWeI2

ZBmsVbVnUEX4uIyzOUj2f+MLDuQsFjph8gcejmaoAg
hoSbbYZ8AR3I7ulWRg9Qi7Q/KYZzzR+CrB8sniIkPWTqyAmiXtRAkRQg1k2c5YskBu2MOtXaoP1hRJ6J

3a17cU2B2acLjT7uDG3nBoB+z5+yIvIwT30e/7uoJiEBjk41I8A/IohnzvToKPv2BSNfIRnewrwOoKKQ

QlkzPijIMNQonMMwhD/+SgtW5yBnOMqBUAFKemnswk
njPeQhBbESuVNW8UbLNHibJkBzTpm5o9U0ANUwaRfMhJOZue+lCRTXWc9ww2CKKD2oWAmYhe0Hn7nTZv

26r1DIIIB6jy0GGRYUKJ5wuqfWWU7Wv0ZY0rhuh6N0mbDnm1mjFWcqC73EdxxIL/JqgfDqyWU9692sfn

3gZ3r/2KELOqASYbueonY30fInpQ5quRofSrl1KvlP
zffd3p0e9+U61nwbQazrRsSNjJtlDTwGBhj1wnM92zbyskObI+nLbL8yHpSlCX7+NQboFfItZmCLSibF

pamwVFp/ii9ExPvy8xX2NfSLaUA581n2a+yEfeB+dQHPZkmvOx8KWYeQCkyNpVC1UlS2OPG2avrmR9SM

ZkMr/UJsgwc6SvHMu7AWNq6iecOrAT3MqiZrK/zpq7
BYrju4phF7d4YpIpsM27z0z0fwEkK9PG8/hHph5Af1H3+o3VJ0nK6OtswxbXhGLNcuWqrsVmkJrpzn/H

sgHi9hCqXxdmMcDJvghRtWf4EYQO/NbOP6TD9saAeJixqJkpT4hur1cs+FHTfjgqTLy3/T2DhfA09iCH

1QOFS/02UTE36vWyC8u/14vbOHN+GUioiIPOIVoRPT
FujXQZI+pgnFYoa+WTsg0kbP1WEny8N5PNuguJB3JB50KbMh1C+S2c2Dw4yC+Vr6IYourFXm4spVwrgX

4uZA70zjiDHqU+Hl/pKFRqsRiNvhgHsw2/Qb+b7R4RlOOPxF3pNimJRwx9urEINI0JASeonw5ymi/Q+h

ZYuxQLb5FXrpIyS5bj9QmUAGOr+fQtJ5tyCNJl3hgZ
EctRzL6FcAv/v06vgxcGRhNzTrSB9KmB9tXKAfqJc7mGsivUiywRyebIYFycwDrUl8fdCThDgPtNsUv6

buftW8fBEql/sEsCT08pUVOjWkXtlhEEhnnc8fi5h9y1zMvJHJvu7he5GIWAE9QOFNUlgXZBF7aIjsO2

Gts+3A16UL69zibnMxBD2H1AAcbVpGhPqz0y4idkW5
MBG2l8zedpvMSUYQoyHsyZJiy/G7IN/B96gi0C/WCW7AsudvbpITNoXzMcJmJoXcxLRAxtGoI2c4Vy/4

L7Wru+TEpRqJuLPzc8G4X3sqI4z+lOyj3HZZSie8U0a3XUtx8ATWO2MwW2de0x0/1NDVb5M2A+UxZf/q

N30B3wAzJo3zyOyKBd6I3gkXcAuqt2QiPGNbm5kdmt
8kUyJMFW1CpTczq8X1bui0kazcNwDr1HHal021Q9ij16iX8Y9QvAVOqfWR8+O8N1uGFIa7Ya4OQdl3mS

4a3mNE30x3lxClpAyEBHY3Pi3LNq00XIcMQASaKSIxIqeeGxI5HlYArl3Rl5V1mxbMO0bQuuJJdrjN1b

cjSKX4HNsVt9qEVDLb9+TRgOFhRoun6jPLv+Oazqj3
cMPDlG499+NQxBOvw4elB1A54hc5Zer9O1B6U6/2xO+u8ugvg2eU6GeqYNXI7M+2nb8Jq5LEf6+BGTWl

MS54XfrTjgz2pcke6NjTEdIKK/KokgeZj06zt9YcB/VLAAGvUmtOLZxTze7obXClj3rXq7RBCMBTgPzQ

fX0qeAJJmmJSmgu7WHqYaT+6WumL2CCxhGbkLsRCLf
ispyMI6S1w/9TkEqvK//yicNrOM/P1Ay0pNZd8UQccAHtNSw1H8XRgoqwMMDYhxvZro2Pvt5mkQzebYz

RK489Hs8oZnfFexlDcIe12YeK1ccM40bR/9TFf7eMjSnNByahzmCpJ3nEQLY0mdxIvVt2Ns0dqxT4Ms3

opoPqaA4vh6OTYplQwmSTDrYKZlqO8Amoc6DbkETHJ
p9030Hwv/WQeIDOP7gWcL9k0ifLNMU1m6w3AthS+nm2cvFfxn2bDITJhkGecvjH9grX2z1j2aXj/DRSr

7IFx0T5xQ7LaN9qvCLcBtefepQko36PvnHztA+YJhPIvBk3Adf/gtmdGnmyKgGcgwYOciT5JKy+TV+rs

HVXLyQqP+1G61e0ebSC2mBterYLFJUmugalSI/jQPg
y5YOqiP64dV0dnYQhD2N7r0lX26pReCRgUuqiiSPmK1KZs8l2DXIL7ivy19S372jR5RksBFe/VHyBkMR

8o1rwjF+Hxbk/4e//T2Ecf1j3yzgM2p2UWX3xC/6kbcx+LAh7ZYFXT2ULoeeBblJraEA3hIgrD2WfQ3M

gOH03T6YZ0piBZIR6o+KTF+J9peBH2XW55gkYLIi8d
eTlzPoz6KLiOT6X/RNl3FTT9bAWeAxP+9/LJkRLgu2tefQ2GKodiwL+Fo3P1xKC+c80bYCljG8aKQrpo

utbCS65FkxGEYjSElD2fB+gA5cUV6h28tejCDFpygDDfCXCXPab92Dg5f2o/1M6J3Wnw7KZLOhQ9qUen

vMZmY2FWSO5rKyUVIqV2xFBcIEFaOzsrRunW9md93q
p8abb20d1tbTxX6S1hacOPsOZ9Sa3m1alYQhCgxCi55qpDYLvy+AhzzxIr8d9IajM2obI9M2OpLy38jr

gQ8tTZar0MNAtxUsQ+zEOnuVgKVEMoKV6wy5Ir9q/EakC0Ysbww1Cc72qd2X0nBgXJraHX1Xkqh+P4oG

KVMMyPk/xdqwI/ND9FCwBuTl/LgXZza4cpERkmzhQh
ubUCZLbXfawrQKb17svd1KwLabvgI69KiSfn31Ieo4hz+ic/9Xpa1FQJE0Yft6Pk+91ZhKJrIedyFrzb

0uT1eIK3Aze1yneoP9hWVjd6Bci+TbNf5rCC08rNtjeoH23UlwaGrWBjseQndHqDL8pCWJjSGwV//Ci9

/us/yMACyv2wV8S2b0xqEtof3sV/eUDS1OKTV2QdCj
N8y7wGDanCWeneoypF0Qjk4QezRnI4/gVehsdk1b05I3jdekYBlPz7F30P1TZmQ2H+EQV+yqbpUlvlfb

4LgHeXWcUFH1E/WpaP7lUceIUz5FHA2ygQwv+rw0zQRxlExOVxCQyULoi2JmDmL+sRx30bHz4nhMXuJ9

AKIXtuWDzswvOydzSysecHaG0SPouqRch3XlkDI+t2
H5MfRPOqCwCsTnTJj7UcxN1btQaLz0owTm+xpWba+xXvJXGjXh+godE8wNGlYXbX3t/tNCpHYUnqXGxU

upMFYfIUIGRWtPfeS+fnhd3lIo4pcjutlIzeS6Rv2gB+y8dLZ0zmCixCGvl/Vz1VRSkzcG1LEGSnpWFi

E5MBKxSX8VpDny4eMe/EYV9qD41sJdjjs/lxMCDUSh
/V+SjS2m9+fQlTsgZc6G77PAL3YHIPceIT6TYHewyW7dsvzwIHKUKOMVuCJRZVb4GZDLppfSNnPooIMl

UNpix+kOoAluJoAMqOgoz6GkjB6bK3zhVlGrwdJtZyOdADJVpB/mQyFJUt95DZ5PF2d7/CNIRiAqJkCE

DXEawXRMW0JoskEinm+D+rhlu7Jz3L8rao7DTm1xxz
rszgztlt4sbvZVJkOP9ToO+lERdamvR0Wlrh/CNVnVAtXLphaJp0aRCl3lbF3vC+NmUhyhkP2zrNjKHL

M2WyipTu4DdcxtL3IWlcZa8akMfH3yFApmfLZcCoPfyNZ6xUoiyBbRh992Ant1ohnkqXcCZQb9wuQeKW

SjBsEHUVAO82ATHqXVb23lCxjqXNP13fFghjH6+qxg
cgKt9zHWvYAxZHOI5QzsuM5pIVMU72HX3BJX6i6o8Yh7aeDgJ0H6IrRMtHCIpdWJ842GvR1CN4sd2TxH

EV0A4jO9oqR6AFpEd9F1cAcgHYAzV6xKE0RGUgw1O0r+drQhvKJFnYce0H2tPJ33fctWyDNBvbyS7yKc

xlZcGKMScS3Ca9RAmF/7sUxqmIZrHpM4WsYM5BJv5z
blifN/JM54zzDR0z/PdUZNEw7a2d7iF8IrswWza1oCbIsmJ0caELmuwX4atL/gGakd2MJG4u8Yi2bhEv

D3aOTta3Ki9FiFYK1dhVvN4dBi+fHBX2e3PxwfrqcXkmhFoe2mLamTVYYbphCEJWmT1KsQQuvHCpOhqE

SaiZwTWzh8k+sV1c0CRE/KlfuIZ4IyezUOxJpPIPxV
xKxKM0VB4bBAhd9+kPztj3KrVHhuhevckE1q4T0R9Gkr3+lmw7G0vJ5BDGHfXvHmfTbGWC2GK2uBMJqA

dysUg2Q2j28GUEGCu/c5RyxuNhzI0nbV6oQyGH8dTOdA1USwocRe8S9E2SO/zIDac17NukwriQkljysj

A4XWAcYOWujxc3C+5kovmpQxtQ+jLiWf53w6P11h1n
07LzYysn3RMOj6uwjVa+HTNkLkDg0PwZvxt7lc4oTW55kaa1OcXU6V/dSVn0eVFUT3bR3xyB60NkFn1U

efvEZm9cRe+WckaldiWnlt11s7VnxK1BYO2oQqUm7i3icKAIAhjffUREJCvD6LBrt5A+sPLf2M0s9ePf

BcA3yPlDVE+oCiEman7BgkD36IKGwVLprbCbqzFow5
iDIh98mXFnErtIOhUy941JkKPomsgQbjnbcsgQeRjIPPsXiHfLvm/+MzmakpkPIhDptLbA93CmNDwVas

HhUm5E2gO3is58D4sT/NfeZtCqrVDBqYX4jM5eVly87qWfevBGLAM5B6QzWyAkzt9aQwwtFuMeQVfhTp

Urywc8JhfXttwNdWS58xdH+TTeOm1JsTapDi13BZdk
Estevan+xP0uCsGrqJKU9VVHQCyOQWYBnxJjwywjfzZCi0+Q/k/ypGKRPixzp0ABjbvLNjWtudYlmeBrh6i

6d7E2qiqAuXrw3iupjYUI8qhsNUbgqXqZBR4cavCpWA2fOcCtDjW/mZrktrWykriZ69ThroGhx0uhwR8

ctLQ2Mvn0daQbG/eO393Yrcs0CW9rLrjR7xNq5VndA
dXoHOVE3Xo6Ct1HDSt24rMHC9jGkxpagdehuTb0KJS9hrB+ES9oDklzGaHS7Ursgojoz8aP2MpzvXwn9

dZVFLMDgIThYIBwHDP896fwohF1Rixl/IccErSXg+dgL5xRyEx5DXN0ptTtHIwx8fhqL+7Wi9U267y5Z

Y5OVcDAVlKfMHQfohxDbdjeL8w3u7eoYWwwNlmoDKj
UBGZsA4CyC3RkUbqwaN7//KL5/Oa/GfrkzbzonpaPDIQgKA3SuDeRQuMruYLJ/GLNp7Zq7AruFsfFpOE

1BDqMoPMnRlzplc2HQUMrCP0nRDB40EBK23+yIOYS/LNnQIas3oMvyF8V3dbR1JJHx61CUt3cNuS4+li

5XklhQ32iOcWmzunXx5mw4mHSIfUF6AidOionWVGpV
/u+z3YOKrnkPDpei0ZM3CymEpxi+JTJDJmG8S8Rzgm5vkvMKXsmHlXkq1S5wTQChTep0Cd/4+6pH/5l/

8PUb1Z+B+CPXxsYGvhhdHiaBEnUY9FLD8yd6IkOZgmBGYoCeyUXKtaTUgrIGLrSZFtHE0INONhUDVtxM

FsASHnXKYgOaY5ZOUdQ2Cxy941gcTvvgY8SB5SJ2Dk
AHQ5NEc0H4mdDkOrPPBiFFYJIv5+Up3ZRHYodyAkMkKcEYTdT18usZJlcXMbTsMzQFNoCs2+Ch2tskEq

KwnQZSBkBCLtCliZSJffPWCkQ4CzSOOmVu2cfRGlIG0QCfUBVeMjWESbDBO9PJSlKKFyUWXyPz1AjBsu

ZGFhFxSFTrJhOWL8ejAwgB5jufYjOchQSTEtLAMnXo
fMZMmsGHJuY2FvWPJ1QfM4LT1HPJ8tRG8MkWznH1T3JrN4tNXwQ3ovCGgnH2L5cZSwX1bNAclpX7XxEs

hgzQNmDQxmMggjpKNLEUBrRORvV7SZNBVzZ73uDA5nB61yq7BFwRLcNITgPNO5eVCzFnbFB9zkBQVgFI

V7XnApVGMaX2a5GXO0TJZaNtu1c9WwlyAwlWQcthGg
jYP9Pn0obIKfVI1DEyvu9LkUUXZBxza2BJUiXX7XRoBUh3NEOAKNxBlJkxZFGTF60BFnUNwSCYDOjxUX

cHMEYUPKXomWhv7sohMdDu+4520lutc5s1/w4jz4Va654kp7e6i30p6muPr78h778yRW7AlFWNLZQqCC

YngCLxdQLc67o9PbjlQRbqUA2sWQRPesC8IOvJ80Zs
iY8001Efjm3B+kHwqIiEpISHDyyirKiCkIiUuI/D5UUEh0ANAFSVXLFzZXIno2Hs/ugo8CPCxS7os9Jt

qxA8XJwmEJcnP5ZpHGLODHG/m8LC8gQQrmPQJdTBVgb0gcC6MfAtFBOYDaXQGKFzDhjh+1AX/cKYZ5jO

C2GEUNohzlx52ypbjEKdjq44uf2fCivpkFBq33EXw7
TUYmZhbW+vv5Vp3qAYgYMxdRisn2uedebq8aJM+CjvKrzfKVfg7zn5l4Wh3eDc8t4dGt27Y74KZOV2Tr

E5M+Rkgpwq9H3n9yOm+/lZEyZ3Bwooa5sumkhnDlvwHD7oAdXlA8Pu4tJRD2q0Lkf3tfwN6ryaGYFq+O

T07xv8/+xosAQEjwr+Zx4wP3e+tAALTsYmzncYAz2K
7xCS8A5H0KNfkhFODlk3j6aHPX3J9oCZs4mVXCi5HtjZ83cKGUGbH2M/u3qa0w1t+OWOj/FrN/EPs3Xj

PYXdHNZ4IDILcNHO2BVvpBjdcQ/sJf+At/4S/4yf9NRFb4LyhSurJ5Od+JMe86J/nFi+wXMeJ65mXZRd

oPIzFAUAOOX6xqZ5knwcbaHpq1cZUO5gvaLjsiAlU3
hATiB1/hnESlLnpck5aJAM7W0sL/WdRMiSOfBIzSU1E3Mm0XsmJqT8cva8o5U8nNYllUlJE33fxY8SPa

E5+luoehFq4TYH8KTu9kz+GiZVbuu1oSR0tZqZS6ID9yaXphaHhITUGUtrNBNEgsmj6mnrQ9fqh3C4r8

zbI9vUOiZbsese2mLR0NNjJ+2V8H6DwvqeZxIR1Byj
tJ3ZCfSUzmzr5UZF0kVAiAYMb0PizKnb/oH6eSdzHvuhLx+99VpxPZDDNrngZitg4iJpeDz0ObOK698l

jnooeH5yZyPPtjY74ZjpheMrWD9qIiDeF19Xv08vNSPhNadabJRu8Hi5XQXuGg4l1JINcZvQviZ5rngF

zJHXlWhVAAIRd7Wf2Yw1wnq2uQE5lb8k7777KVWzTu
HXc7+qulTC7LMTB1FqFB3L8JHBAY5B86eIO/PR0hYH52W26B6Ja7IdrbklbKHPt5SInz1mLnxMKgCi0F

UMNodD2Hsylpj13USNTnSDPQi68qgoy7WnUAGp29GjnMHvPRKzMWhS0wtsdJJLm4XsseK7+nAY7S+AWg

tbfEzMtqB6b63mH466B+jBbq42G31TMwUXwwxWdmlk
Sg863xadauCqdRxxBc5gvCQ4VLT96IK1WGvH3FpZWvcyjz3YZ38B4cx/Lucia/NpJ9MvrNLI5e3JqdQECu

LavkreYqtYuYTZQy5fbocmK9Wc0nPVDTJpO2g78SOxEADUUwA7rfvEk38RrOuMCySbYvDOlGfGxq9baY

PfQ3hVv/tbYpTsTJM8J4Cwj1MqePb6O+s7XT+W7QG+
8ijLy2SdMMbHziaPUn8fU3pk3F6cBrQdV2NeUGw1lMK89lqf1+lYH1qBwXF2kzFoM843efU0tb2Uv4On

pw6xnAgHWLJBTbSsd/ZU0uKrPw7VuFvd5xq4vmGqw4sVwr89a8hMn4qQN9Y+qcLmi0pDlw/C7U1C9dK8

/Lnxgz2csr3g8sIHBGMj7PV+nBBJivKBYFbkysDutF
J8FkZS9JP8tLfc6aLs+fdC1gUb7Qvt6SWVLSHmcph9pw2t6e3EI1mj6p4WVF3NAhG5qGj6qcIuAUSd4n

cm+Zkkyfe1J6hS1a4nBWTnaWdAvY9lf7gVIjj57xJaSqy3Bn/jQAhOQ6Y0Xelln3SqDe53J71HgPo/3z

vDOQ1Z0ZvzFfYRe+nftf19b54VeJtqnhW8hFH1zqZY
9RmJVKd+OR/Tv9pa0Qrf6DqglOojL4g3lCxHvViPY8PedQ+/0KxCvJ25vu81yzlxURs0xxGqVJ+8o/z9

/QcuGrwPlYM77wU867a4ZgSXqnuoG3SvV3Um0pizJrOd2lwV2OWU5Pe9f63MR6rsQnhyOt8lO12W/dPi

51PJum0P3oetj93V+5GyS4mcj6RKmD1ztmSIzOS9vm
GQHT1vec8W4+2G3EeMswDYNX0rt1VzZsV+4ppYJWC/6+gm8nn+CIo6snZawHSUGz5RWESeDVlEXOoHg5

jOQBLsZviW7dqb8/1xT5HRO0l3ZMCqnzImDo7kq4Qg/y1Vx6/BVC5E3XrRBzH3Tj7wjn/ep23qZ3JWGB

DuxKEsNa9aXPJ04GKvq9AKfE7AFhWS7FxoQdKDzfll
A4yVIbjp8bZi3fTktJIIisyR3422ZraHdq45tXYYdHYBh0LA8FDqoL0VzQa+flEpDiUdSG+lVQxR6gJU

XrmsK3ORW5fMu5Krkj0EFxn3/lnuoi1YfPtq7hof0MTvtxRc8+/m1jJXFD+v3MJFYkNVd3AuR20Jdjgu

rBVQ49I2U0h1gYeNx+eQ2/old5xXur/9DdYMR7ZDmB
9PzH0Gtcq8ehpNI3TPixxa0aCqF3nd43ZltqpERRPrd++59r3RjheGsnwR3erWgfCb8QryhJN7iLO8VJ

T6zSeSmIGB37g28bjgmq7N1iWGuoiRZ2BQEPjqu6mHMs95taQY1CGXIdVGT1ohinooowCXMJqn1ZYgmK

QObHOOV9M3KaHkLTByFrkWe9v8BdJL8a0toS2I5NyE
dkK8tFXELVCKyK0pMiM1O1rZ15OhCscPg8uDKjxq02SxtCwScFiauhd11oKlVrAcHbwzO002zqMJjzzh

W96h1B/tl6bVDdaGGlC1qA+i/OQ3sfUF/zjRgGQpy1iRrMkoaO11BIRu2euO8FuGB5DqHzC8I7yjBTjV

55azm3HG8fTHQFvnBfyUMIldyr3zRcQtNdw36op9HJ
H6UY/RnUTOuoHG5kq/eVZnrusyZTwGZXgGknF5k/ZVkslVKfPRdfiCXXn2moB2LmJv7v8Z0yf1JdKNOJ

9c55x42wpcGgOm4BOrRN1Bexqfe3jp7lleRWAz+vvcodtG6CGFByglUeOIg1H44KcK/Kpi68hxWTgS23

VZIXK787kd2lhztHoGHNynDXAQRbdwGWyKyqSNjyNP
4El//8gDljm7LN0gDi4epAeGn2dzNl+cZB+mC8WG3v20jiRnMUFM8Lb4wTa1xJlZiaoVfpVdJhE5DVkv

YzkYzPb8k+liF9Hadx6YYwEmMJ8H3Gc1GGQBG4bxShoSxnjgr+MQC/3k/nhlXS1njAU8k4UmciK52Mvh

v/lCcBH//BpQPTowENiySnkN+QGgpbZpbV3ndJXXIT
3nFnexFQT5iqOinW90pdrbhU79zbaESF10VyMvcpPBJl3gv6TROEyp1GLOfbLXCjqH/rzVFBj+lP4l81

+c5u5VAdWg4Zj6ajUkzZ8fq5S8jcnP1C7YBIymR/mkzOjhNh7Ymnn3/nRWfmwLKTFr70UMy2M/E6h8Cj

L7Y3d9ERjXhRvz/EOiZPgRA/Qw5n65R87dGT8+EWUA
FL0I1yenM0hm+7tE7OXhuB35CA3tlYJhYZ5JghfRY/Q00rIkD7Kxph3nfQjhCFZ+Oao11QCqImIW1WyW

7yL6ljXk4WRJfniUSLJAiAH8Bve1F6U0PaTZ+2inx7FrvrRzw61R/1s7hvrLWyC/po/zX+xD3gK4BgyG

JGZ5olPdCTc2B4dqz594lZvqic+BjN5Gu4ccQNfdZS
xMTUGp4fIyNu9tsuBt/aBXaCdR/mrltdTcN9zDz8U/jPp6kxg4HGCYOIgEfv/JrwzVOLj0Wnz9OCeGPC

J3RYkZCwYtUzkRxCEIMz4Zbk2YtN5D3xV26l6oQdtfi9DNH6hUFXG/M9sZfw/+P7bS/4sgZ68vyN7Pj2

mW6UM2WBT6W0Ancracht5ylP65fFJ/b1Ky7t7INl3o
20SswtwqNiNll5H8vG236CJmqkPZ+8sTwfL/uX03FJfJDDytUNad6jTs8NmBR2eNNeKO/CU2IiSGvQww

21K29c9R+k9Wyly0cIUhcyxuE/NtFO+n0z492hbVSPvUVl57HAQALu4nc8kgo18oGlSqRn874W5CyatB

mPTUMJj06rWa/6yN+9VDXIkSsAkxsutx/j7BwKUgtX
WkCD6RiXvMRpP/rfJyv+/L3nYfUCWoj5PPFedddEpLljfRdkFs/dindcOoVf0Lc1JOPtZL50KEnpy53N

Vk2fk1C9oULywnMBXnFtUAwcNyVav5q9BdAGcd1LvZRgawELjoTHK+5h62q2o5ZyvGTIYV/bcFBon/XN

Ifb7VhQcKu4hbGPh0vMOZJPUK4trhkBnJ+GLOr3FI+
tviPoPCrntAHR+IWi3NH2t8XoyzH/yGS9f/CnTn48DLZVu6mPj3yYV71LzduY+Mmd/TKF+psn4XC0eks

xf8pA/KX8r+U/5J6u852Ja8x/vAqyFFH2M1cc2nJ2+hWgE7Mj2QmWWStGFGjcKBaMCTIXNLA7UzyxhEa

6esF8EeXlI47vcxFYj1XDE7uNJLHdG2NS2B2KaJJXT
KcyMvnVe9I87wBPb8X6XbmyqMFvbh4IG7XsSPmPsQqaN4+1VjyNvHSSc8xwpjgPtIadnp3V3DIFklJ4K

r5LisV0qcjb6rFbsMp4qj0P99wGqnbfi9s48NI+CgeNv34wxf2IIk0aG3LiMzleGmiZgZhkMEr/OPr7J

OF5+9UOd/djFNRgMml8RaiSvq2iPvWtQhDIYZS7rbd
4AZLRm3okNA93MczY7vohfT6z1FzabKrsQuNLez20TTf9svlbMyo+f1xqzBRltpVZNbnohX+aWAHlzH9

4+hcfJKRO3CKQAK7ldbG7349KXLLuJT4D3zQj6L9vF6w9TlU72ue3E1sHwQG9LPLEMp+Zrtl5Q8Ok50b

QFg7BcD4FdORsF8FPGqagTblcgYbn3NyfDlyr64FUc
OygABA8SDgXV5KRqI+CjgFuLmOwAmOF3r2RTLi7IVRd/KlbnOTp7rtOd6tbteEWW6oXxRR34e4OcgDiv

SdtxdRFWv6jb9cL/giC+/sebfDX5P7/dnEptcdrxgjOE7pud3V6Jb6UFyiWU4eeDBVs7VtRERAMX18Bz

EIIxowFZwaRpr0xW9DkbHKxb991RXBsdW40RSqbPk9
5Jhtplm52KJ/d8O0+8Sr4O4Qd33+KaYvOE42akJQ3PDqmtGUYuJgXDD65R3QmTiV/LQR+KQj32wt2Wan

iJXzsbTqkOUgFqW4+nY57W/9c1mojh8m6LIUkCLjfymnKr+pf/QAvZZaDviSvnfFFKvu7vni6kWmlWK2

N+r0pcXXsYRkQs0IRYXlOHpxo8MkYYi8tXdSQckbZS
KrF3c6hr/Y6gnAh8nLZ72zWhX+PlrkGDHfcgqY0c786wGUDXUBPtrB9uLxW8cl6px1XtSheYN2DJ/qkQ

1bU37W74C/2wd4ksrC7IrO6XHh8MUfppxQZS4lenvJhRgs804Lcfe5k3qvk7Wf7VpLDfmbPov5LJMruZ

wJOtq0FmJAfT/ewQqiE6ZEAanyGSu1nptMN9H52qh0
fsz4CTVF6ZtudbMNEp4F7x+ktkI0VlY23sCzYWu3Gup0b54mAGxaV48Bpx5t2mU4Z4nBZpI0AMT+1R0p

f23wY1tLvzAKPd1mQzsXauYx+BteFkJnZpdxKODfRFBeLQoUK6Vje15fXAU754lFtULgaAXqCcQszWd6

jguTJPo08LH0dp084jEPD1L3Q/2vzHZ6r9UnuYhf3C
Y2M06+E51UjRe4kbH8QcdG7R06DM+hOQ3nDm9tmNceqGp11p06/J86mRWI1wsUxbrQvnLIZubKP9z29S

ZygmE1QWgRKBWZN8jR4uEDXD7Rb/w5ampgjPzmsdxiiY6dtVfSnsvLiwYt8tZtvmkPrX22R/13KQWfcP

/Gvieak9LbXzynzmCj7EfQbmSCI+Q9raHWf5TTK+5k
D8xx9BjmPEy9FcOg3rRQDZsmWII/+VhonWQY4HOYqebR3FtPXywNDbP9VPXgZqRZ2e434ZJeIWvC98ZO

xzC+/valSRnJz3Fdsza83sK91LiZc7h0pmbb8cEBg62WOjC64hFSN0eLhP4lK7sRdOoayNxszp6wWuSC

Nf/spFq4LSjOLOepCY7MdZxTYA+hoTBXBv+Qz+/OV7
s2VkucAHBvsGBDU2VDJ5d3CU/7QcfY2ciw5eLb7Pc2OzqYtHif/pP63QBBDvMYCpjbBg9KrBXjgWgjpi

ToZhnB8G+9J6AqV9Z382633Zk/1HpMN+JuoYjPg182QIU0mc1WeeS1boQB1VYON7koE0Tmr2Vyqe48V0

WV8LeOaXUQEiJAu+RSOHjJ093Alkivk1RyiTj0acJV
fK/Hv98feUGzDVYJJPQr5KxT10TSd7BOH7SHSu+tBfvV6tsUCpEZJngJo0COtXgpRFd9b0c36qpblElW

8sEp9sDd9n+8cOiDNgwK7Jbfn7KdCKWP0OKN0nm2v6mKYq+mV35p8FvCoB6dLUDaVOgexP6Vw+mYQnoY

3O+VfZbnyCNXSqstQQVPVJqjsvZEalmL8WWnucbU+f
3ahIlWcIzlwhvIjzGm0etY3C4RsYgAzAGmLVa32LLWzarxrzSLf39jfa8abhKVoUNPABHaNd6ceW3L1K

UKoPrPEG8D9IbL7dunY7nXQ321rg/4eZ+DC2v4+som1KTjxVqkiIpCIDosp95W1C138yYgd8yZDLf/SN

OMpoDhMuz9ALaTaiFnuyavaSUlgJ0cNh57kQQyQ+6G
pu75uhcir16yZ6yFv2jif8wKpcDUtJFaFWSuTU+5Pn/+1CtO+3uTb5f2VgbmUNvX1LsUHHy7LXiAX7hr

6QlAKYQC6HFBG99uezU3MfJNkql5y5Y1luSiNttRkeyyeDPk5AM7j1ib/YSvmXuT3PzGa1OA1HDkDNeP

2cy0QSayrIqquVp03hYsVou/fcjTMEovTmgD+MFWB8
zb25YDrA3HlVhGqBmIOyiQP2+DVELpH4sCwdArtgYMnmYYaSEcZIf6QWKcn3RHdod3jeQE3Ac6p7Y75Z

YL/k+DKMXrhVpKMDo52E/7938REjKDvukOVZlbmuG/lC2vyGSqfvh6EUheFtCSUCOU/GCdedDtZYNvyN

wt5lu+lXyEquy2TDvQg3Y/ycOL12tpZI/u2AFY6ivW
tFXZlq15A/4gxI4ZAvsvCJdFc1ZQKGvZLawZbmh9WKAuIjsfYXdJdKZFwGD2Kz0iCliRp0FITZ664k49

8vQT0nEyg1yrFRiSwquWF1KQkE5S9BIYodPVbcm3TLFF7twcaxGe/qsPIkK2tRI63Oiw/b2/8I+Z7ZnM

sCw8vuOab7TkfwPI/Vteiu8AdE7Ri32HoGciUEhyRi
HTmxaYCecHdU9w4fEiCEW5TjHgMjc99rleetRrF52rEWQ5MEBCRHGxyCyW3rlzpb7ltwBEg9xiHELGvO

uzJATfHjCBXC7TGbiaEoeo8iUCUq/eD7JBYlZqsP+1gFNKF9zn8XiGqeoOwBGI4JKxyjXVch3wpkbjuP

9bDF3G+Ol83rZJ6al0MX1tcwV2nH97D78OP/qY8yNT
fZ9v6fGOUZ9ZeyJF5IXwMEAiBfCPpqLrK/xXNfzHZcjNOftJOa/B0J807WMjPhOzngNR/quVWnxAh/m0

KYlbiSIR0ZZztGmDCVPNf28SN8HHWEM9vPRH/umK74lZC7M7D9AUmHdAvYOHhfjRVjCpuW8lfndD5kyL

WDbE+TFOEKnMf5Z8opeitacqO9HM2S9zxPEHOFrJYB
eS3TKcWu3fF/Ru8K81fsg+LZ6DeJ02uO0MMf6VTJUCej4+Bco7db064lRfT4p3y/ZutxT7GxxRmv5YQU

y0Z1ICp3BefnZBk6mMz9eOR3HFcXJfB+tkhxie0MLxuwl32Tl9hTgJdVVVW5rkciVvAV2hkohLy7qSvo

z22TXtBSYwnfMtFbONteVT0nVzJcjgy18MsJtYeLNi
rnPnJjIkkaDfXgUkkCWAJlXs8X9EqS/unZfkeOflqJIldeqlu8arnSU6pkqT6o7QSrvVMGJi/qKCNWT4

Pj7xRmgpVEL6rNYe9Lg2L8S0PXywdDHJmdjJJ3pRAS8rc1b8QuECc+YwghMZwHdBcP7IBI9gXIcvG9w0

8fI1/ZfQTdW1q0jlhjR7X0FAf4AUbUA02uifRbQAiK
I3fpQNCIZNS/0lfMAdGRGRa8ragRjhiqRIHCf2iRv90+mBtxYURG75HTPwbsUZ3aXO6jVywj7CvTwMvG

rCF7+YJ3JNttXwR4gn3UGGPSIQzC0hTWjNFla3gU3q13QYJLVY73IGtgexA45iMFmDX/iPahZ1z1hTPa

BoCyiXfm5M44Q/Hxqk3Z6iBpyj6SwXTWuGNLOIkK6c
FfiPzOsKpDMbYJrI3V9Jh6BDnl4gweTJh7HePDfByPcd/dZWmdGvyjp4ze7Khq0SE/+EGjHZZqTc3fKz

l4FXu8GVfn47spbrjPo/bFmHjRnfzwU7sBs4ade5ejmXalrhP1nrkc34VBozzaeWBqkZKAisCeAPAg1+

nV4W4U/p/qBKLdHAi63BJs4drTk2KO07oE9ZMXfBxS
/mpfNJfWyvJRJHGrqU+Lewis/nFjhcngA+TCXhow49BJe0FLbGyOG9l/0rnAlk0MRr5UQ0mZjZe36JnjGM

reVjLSzhGR020DJ/y0SrKSczhFdO0YADTWgvXY04++ZRjsm112y3krjGDB1dCBq8FytVhcKOhU9/j8QO

VKTXR4XqJwdNEDFsbj4AwflxQC8FqeBkioXH7q+PWd
GtHuSM9btllGFJA9a3XXDbwNDF7vcrg2X/EVNr+9PnJECatiRJ9tP+00kxPSjMeeWFQxDsHuIcV1QZvZ

ffRsWw4otdHSzX3dPwuQ7cMtaywn66zf7x8IisdnvAGOZQUJ6/V4sWdTrIz5+81s6zf3ZgzYxYWT14YZ

s+mcqqye0oOFciVpw3Nd5ItO1LnFnvTmP3OEgn3Mh+
HOeyqYqTE4ziekS2JkDlnsZAWj9UZH3GvuieAhuzhnvw1Tw9orXCB6hkr7+zNd0IYXA8MbMn7/y/LmkQ

WH4QlFIAcyNVGRbumQ2/a8GzWfij6mUTCXlbc+6YDtWQXE9iGxJCvggZbJCQBk5VKX4wAisqHyjG9rLd

QC4cWyNHCwc0DAvdONgDIdlwG9aa/FxdypuKX/Zx/Z
MPTQ6TePjOAF02XV5KEQUiA/zoHYlQNrOcDtedRKe2WaNDgmDeytbH4Ds2uRyBINAS5gHoN0QzCg0sv0

mDBPQtxNQTmSrsHBGoiiRRZ3ZcGIy59CNH4VD3pNaBmbvUpVG/zj2dsj0h/CeKOIgwnvfqh4uxlqT3a9

87Ieom4zmAsMERPgMKBwlP4O03Os8hoqJaDVabQuNG
q0i37FTh8ocsqi0QkE3GMhouuLeHNO0xb68b6bhoWommjmkbRoVHzj8R1AtOt/ZOyXJqte6vJBs1jBRp

czQJYRM/a3MzsgrYobjq/2zjo0ox6dggGCbeUGyr8IbbqCvkuSwpSiTFba8sC7isqxNeIrqF9Gxk1i9c

5IMw47R03OCyJeiTiBCRZDm9C9achsA5gJIWbYCe2E
1xYm4G74efW4yOmM1S1Hdcz23AAfz0bE5KypYLJiiIeVty8adMYkbfXzbTi8164P01HoZmAIQRNIDRCI

mT7036ue9rA+Q/BiUTHyZQCu4DFYEz/MaEGIZObHeq/o+eZ6HzhyaBXSdbO8sTMjLBU8LOHYQSw/ZMz5

eUx0bFmf7swnppywjGi0AD7aqsqmWxucUtY8V2mBim
LB3MTFUZUMbmbluOuIB8WV5hkQZzdXqgNcSoEvT5Xny1EpUdY1KTFCHGV1WS5ebUUFXFJ+YyNV4pMB5T

hA6IRy9jvqO9ay8dK31fSLO/TtogOqPvhGA8oHtt7xEz75/HObt12/JbFrF0q1YoGRkbLANOKWvJ0F/t

V3AiIndF/aXH1ypT0q4qZL6DJZoTKDI/jpJVnzXE3Z
pk9kCg2CuQ2h3o21oFtGuwZPxrkO7BtFdphjlb0PtGYH2EHzWC2X8YV6ysXLAGQlOUfYeQy37LkefJLs

QYnJEFQMcUfZehJWhjIfY7psNStTodqU5xWYPvTWPYGKXBC+SkTZgof1trGQnSB1H14R9JlfyZWs5Wfv

T+icW/e+GRO0goa0goWs8d79535ZuOFLlMQDfxqf5C
rSMfqv1yQWdTduSARUw1fWXLjqLscFgLnWRmrqXA5ckxx3akrR8cWZH5G8vdE+Nx8//pZ1lm6jKXO+14

xt0anCZ7y7I2J2RmQCfjAKDBJLpen8kaRVKw8uJCWmf77edUMtAj8szCTOMS1fsQ3NPIKbesG6t7pvR+

pHR+ZfVwqef+eUD7Q+wLhd688z+cLn52NsVLFMHBzC
hgKyOdRrc24WxNv9CR5wMt+djdPhfHYrEs+/q/QYgN2Vu8974rBly+YCdeh2oQBYnk58B4o+BAmw2mJ3

2OOBbD/Y4sR3o5Q/KEafvb2BoRpdF3A75TY30UTryAUr31SA3IIOtyjcPOJRcawSQzOlvQt1z+oo0p7g

nYX4qcicxhEsgp6INgX9FYIiVwzD4JPWgGOQgyX/wK
bGZaRuIHlRss6IIOmGMRc6dykHdxxo5ZGUsMxW212DZOfl+LXTCGszyWANrZGO7Ds2T2WXc6oGDvAIyO

saNmESRLHEDnOy532vieAJUn3uxfsO8rcX6ULuOUaaIr8rLFFydMGurRmo4aWcCP15rzckbkaOAzIxby

9sy7SuFzupDtL+uO0so+Lxo1vRsHiN9HHJa6Z936YS
pj5JZr0LoYFxXyFfeRzU4FQ79vZqPVBHA2xjNBwmsOc4dEtuSQJk+W6qGyLR0VuaxIlU2Uu+BguQrGpJ

6dwQq1c5Kff1eXHtJFQprBByhM8OgX2cqrABH3BvlyNaqYyDCx6XAiEMH18+Z/HtIxKILRwSchHKNBk/

cvoZB157ATp6A27jZ6XzvjRs2Dc/E//zx0q2EP+zHH
V2O4PbT36MKniko2NR+I/h8BUTDFBiqCe9r6YjwzvN37T84hxXRVPL4/v5t10Ulk24go2h9jZrC4uvVr

CpBplMdCvowxVaj9WI4bWs2/xEMwBjw7/Fr1pYYiNKFdItacn1lmmt6U1FyqdGU8fLmLsFHHIOs+JP27

lMaJNJl5NVE/X4klN8pbcm3GC3wzCx3pw3DAEoXadM
md0I6G5uUefCfhOPnsG/JY6gtGGRixO2Y2fvXjVWuD0JA7pWi/efPwe78fm8k8+VOy+15CJUrsM38KW1

lm89WN4jqqfK2wZVrQSHDTg59QVHb2HK4u12ekFO0H6FQUqPV1IJ4GbumS74lBFU1mAs1m3+/BMvrthw

j/DE5nYGf9PO+/MDkq3+NQZmSKAYJJHA8XXhsUP3c6
tuUJFx3E2kngk4mB6Guo8rLQDHluOyTOjTDxr4gvb2NqRTwBabuvm+l36fjZBgvvD9FoSCvO+34K6d3H

W7wqW9PCvkmWkRM2zo76Fq5eJceI2ENFc0LYwiqExfo29dj7v26rezJMM5daqaT0nc0RyhM5rno+RCI6

Shqml44LSjBQhu1ZEFcXO7IkKejKwqu5RUMX+hM+n2
EALplDXR4rUiHTJ0qlpI6Pggqjfw2fn0ZWL6tLhEzAJKDhRLI17IT+NV/9hNQb26hUAyEw6mw2xdLn8q

HcCT8zL0/4GvBjQ0rYH+ftmsT+Xay6d1u9+FOzS7s15vdyKxXcuUT9lQ2tgX2HBEODB1QWJBvFYdT/PH

250RCSpCWlt8r2EeKAyYbfNRG75VQEwQMke9wWfz5b
7ooU3KWFnqvNNyPcWU+pLT1RIiWJwWk9ND+WDOSRS3bSWkhBChrd6K6YcZ88QF8gNoMNKPlhLLGjoPx6

WHBEA01iq7Oi5IZxEI6sbvSYZsDUBTSbq0c73+cXTaYbtU4L0UEsTtpuQ6vQ9q3IzReF9QWjiAxBOG6j

fa8+HCPVSrocDU98w+zjvRIXgoaBHsn10UmLDD9oDO
f4V08GBsBYSVfOuAZWXKnUWq7uZ04dmhqpwAiM3vBT11kdcIe+QmUh66wNa4GTM7VJQG+4SBLGFC5h+x

VhBuODhjJwpo8TTjShj5IaE2kVuRiivSZ3GkiD0ZcjYq8Cs9YL9wv7B1J7JbsaPolXx88s8enlzNBCut

yMGcXHzwOAZlZwSLfb1A69loim1te4LTkJ2T9sr314
wB5MAaN995+B4S/n9wSv4/Y9kZPNdib3Cr2UYIGoIT7WfXCNkt/M46/ItLG82LFFgowbV+j0FXRsWK0i

0w012pq731dW87FIrLXDAGi1hv9nzpG/+gWBOuAyhQ8Yij7sq6vcje82hvgAUiJKAKHUH9+dFtZmD00t

7mYIH/eOcK4lBA96O3BocZ57yvsk74fl37uM/gvmHu
wpP5bjprqlJ0kfVYcmBPxxRXZTE2k7wbZWM2jYVfe/294+0qoHyH12F5Oi2rG/v553klp35SqSD5g7wb

tDdpZG+K+Gw+5pbvVZsNu5VlwYcR4qawB4ynEITaJ8aKGCajq7OuNWrjqo+VKV31cQjWHnXllEKaP3+a

7EGHsdv51gTWx3ThOpIZwkE+lGdBaXQ5BrEZfPTUdB
ZgBdigjWy2c16va0MWJkqchdWpQXztBT7sbf2KKPw4+tngLyX8MYFflnU6cWk+vEuup8Wu+798pAQI0W

kagCzMuYvonlU3H9CCJOpnjau16ycvjuPfOodGQlLQXBQ1m66PTTujru1dX9vxl0EZjbdQdHlsAZMD5k

HrPAXQ6/5zZpljV7W1AHfq8EKUU3o4qLJWhkn8pXKw
cMF97kznVQ+AH4FJZ4Onzsbi9ODjKAhZTSHGcR9QEp6GJX66gTyfDK1kB6lgt62c+sUXtctyzVT0f//d

6+k97rnWyM+MEiq62idPhTLQN2KPaf06IKcG289aZzbZYpL93MjXiXlIg3qS4sl6DkMWJCSjFRx6wFAG

F/VGXJ3VSUxTpIaPrlnBO/7/fdY8ziQN1PlkkNtj/a
LJKljxybsBjmfeo8uneNK+6r83QmGrUC2t6milQhKk02/OXsrK3UjRm9aXOlY+voNCj08p0qNOgLghFA

U9Bk7MFstdIxEzHCuem4Qx1kcmFF9h5fhy+TlD0bjppFgggePqApI0ta4DVgBgflglba4yHZe0wwX66X

IWO3d/1NEkZWi/e+i1lvvhd9xhRkGWWZjAVFt98O01
vnfVSD7N5WlnEO4QZYTwzdOjX7CpxgXBUagAl0iniJzFm1kuj891Md583j34OQ702bOzArXRpmPqZvKa

bJJEpVQi7oiytKzX+5OMAafmRI/glsCNGcvwRkUHO/W90enSkvVhQLXgzxaHc6RaWai2V+lvl1SLw4Uo

bpIyYc6RlAMajtoiNP8Srj1C8pYaXt9OXrlWgAt0EN
HdZCmWuHhFtFg2agC4ITgLhq1qRWm4xbQxY7aMqwEycSIsUFFEtAZXP0QOcbjp5E7sau0Qi70c5jlrD0

2nUEa8tTDK7Uz+StO6q46+iBZ9RTr3EWx3884uylyVTCmptJGkp0Tp6eMpkSyLhP1naLHKUgyxdoxk6M

FIsxe7et6tKQrUXMijBaXw71OThnSn/9acbL8I0uC2
kbM0TE6+7sqaI1tapdAiFH5fK95O8MhyFYFlTqtLU5B7EFm3oL4x5D7beAr5lf42GNZWofgOt6s6bX/I

hjrJr7zxiycN5+s3EGJD5ogHQjFkdhhQLYb1zK483T60a6VSdWOKik58lZwvhMTBw2SHvpwXNueYIH/h

AXY80RTWwuxi0LbfgGRUuvqcH29yUHSCM2gRLW3rUf
8GBm4k4IcW68nGPrXfowmNpFxqzSHhwcyZvecjGNM4wxQMrGuRw/ufvi4aECIYF5frlHDSP/SzxmIUxZ

9UkQ0fB9/Kw1+gwlIleMPfiPlT0HKnAZyg4roFYhkNP5ko3vKBJsaeV9q9s4QS1J2/FXUdsrwzsiWMvj

Iwg5D8ZuNMK/DyFE5NY5xG0qkCJARr7UxNqXQXj0su
ufOesL8Id1iaI/LfhRHxwNSInAQGsW9mRSgOUbVZhn36AF5imzpN/OKPbg5teoVZYU968Hx8eZRHX2RL

/SDL+in9C4K/3K4Hju9TraRi6XO2RxybMV1BAdCI7QFOkNSkrJ7mw70fkaX05wv+cUASNmZqIRIPMZxR

G5ayPXpqc4Pg2DRtBjxDxy0kOE21Y2EESzwgN4V33v
CF6davWHgDZWSkDFByLi1/lme+Dpl+E4dzg9ieXQ6l7msOkfhrS3/JJbCThv7m+HU1vmkKNxWY0fbsJ8

oMaZCiqPI6d8PtBvuuTnnZJcQHvKClGlBLQQk0ExTIjzV8l6lLvVNWKiw/3f/icS5+t4Z5cJ+4lNu3tm

3jLidd7Xeinzy9FenLPLk0pekBgJBelsbBJOI8qFNT
7bUTErfazAJ4sqRxaVSn10prvCQQFUUgNLLfa1Yvf6zSuthXSHSb3zTS4WVgLTmgaPj4mL7YvhaC8W1F

cJadFa6xIdkXtPN56SR80yFCuqBrOmhgIjVvWf182Buw4Sa7b+gkz2mSOzC4dYDvRtX3yRo26ByXlB8u

ErxJ9l6lL5kCllgwRU2jKb8sQZTLPh/SSodxC9EP2B
zKlnf4scJPI8SDjnQV/nNJWmxylJ/aqeBF5rlAXxY95sn6yVf4abG6cmMQ062Kxd1BlR1l0YDgPxep46

WJu6ViDgpBSDlnbLNmrzQuzm8XbnX3z4Y6+Otfdf+8JUY1AKfzxKVY1y0C5G+/EtSqjOzJJyTOwTdO3T

31CeSwdFZ8g7boqJrLW0nbjeFf/gQZU6q0X6rZIc2w
B030mjrIWhlS1t58q/9I9LhiyFEMJEiNl+1RE2MZbsr+1KVL1CigPmH4WcwT7RFq2qLebpeyYufY7f0O

d7uVZYbxjZtlaqXwvizb7AykC3ppp0VHCeGXDayhzlk6sKSLdxugo2Vao8RlWAeD0KE2FITwo95gTb6d

cnjZp7X+u2CJhsuOPN9bBWNDQalzAuGzDH9mkjyZ1d
MkZ+6X04AbIy/eyAwuriT+V9HC39W+kG4fv2cbHOdfp49x5DtCKw9S3EOweyFlpPJpGjpnDmK5fzas+j

NglVi+QPDdggwpvHaz26BzMWK/SZli2VrBcpVE8fWAIMAI38EMA9C+7LD1QiBpuUli08liaVzBhl3jM8

d6rrSuuuda1UNAlBRTXjBfLuxtfLwctNAyRW37DUBK
tw8s66tehemqCn4bHbNiEs/k2MiHihqvmfb0QNp4CoOdEtytWDmUDuObFl1XOs3pN+Qcml8hi+gwRRw9

fSsgRrT3OCxs0b7fk5W0rYwS6vgbgjgnkXZs4ErD2H3JsXw6p73YWg330JJD6SPHBeH6wpDqgHzn9i9S

zBJ4ZJYbhj4fLFuZCEF/iFizD0BTyVQJJrkm/IogQp
/q227J/KvNt571fiXHxpgoLFxGORK0F0QXHfRw7apHmS9SHUFdUJHsF65pkn5YczCHcYLpBTrxm1PG9R

5Qsdq/6Hvyd0/Swrlcdj+5PelPNcW01geZ1kZrk307GXscSyHGndaTajfwJyP12yXPnDEHRo1O+cKPeh

tNnXB6nxdJOgJcIg6l6eSHe/TCA2rmnSXNJMtEBcKY
ofa0BsE2BGpVYmoa9DmnBq1Ek0e+4lDptsGcRQfh/FyQ3cWDNH2UZ4ero8L1d7b1hAhPXZbWmy1qH8Zy

F5yYZ2xD7aoaeQDx2s0/lJ8LkI4wvyaWjRgma4i1Md0W8GkvFdt8iC8W3dOkqU2G/BZrksybmz+VqXKE

4BErqCpi3nIc/xnWQ3jw1i0+bqPHfk/rqwTYO/t6yD
byxjSZkI6mFvHndLOsl8wY/VnYb/yF/wLwh+PrMF+wwyM16r8uIua3F0dcfs1XQC3bBBiqMHNQq7SKXg

0JwxCwuiO2dQjoSaXNYzGNMkSwm+97gakp31uILgb32P0wvOjXc8zlAqdqXZLdp9ejPyMuIFtocpIr9u

sr43PnJetJOG+/44TMyZmy1PfCwgzbZLEUVqibzAgX
MorFGt1zrZat0IrV57rD9b0WmL3IioVI0Ozt6bF4QsAcEJPLg6OQJUxvMxr1tT/1yN8TiAa2FS09g+A0

AH/8VqjW1Q4mn+tuEeoXqKB3NZCsm3jjheiJFbyet9gO4cHuIrO/lG0XH1n3hBlBny8CN4bxkl8qrFqN

bYVBiy/dbx6CimilIxkNxQiBppaydmwZayOtG499Ra
wz3WzJ+hfMTjh6TOR0PavRH3SausvSKkHZ1xqd0I6y5oxw1n42w21ZD/wXj5Tevvd/a6PjftmPjsxrJp

v+laMtxR8mEV+vwKXNSePMjEbD4aa0g8mXWQTORm/wm7mynpHEr/o3vgorp0u2/F2D3qlc/8shjIhJp8

KofTp0gUynh2o1djAybAkw/zXipDK1nikOc6RzeR0k
1l3YhYsbpOiuFf9NybdJ5+aphLBClMVwFsx6BJGxdBv6dhk9tGmSMyGWCCEmWdOODY5PGc6Ga4Bgf3ib

e5amS6LnJsZV+uDjS27679gUZ+oNw/OqRkkE0WJIUWDGo0D8sNibc2j6D7K0hqoyWrkSA6jRzJE+1uMK

R3NE0oGXf9BhF1BfkML+MGeKX3rbwMRYlSGO9SNJfP
frdPjeequ9ICSWPJryZelLTKkjQinsWQvMrmJaZpA/2Wk2LtK9ghhp+ylcEDgd1HlljlODHibd0drbwG

TakTGJshtUZljOjof+fsKU1XVBzRjIQvDYBGvNxuwyhh7S9crQ+obE2fLaWwIC14yFYqu8Z3IYEvq95B

kwEfQl/4udYnQTqjzd4jsnc9eruSfpMJbr8/bMYMt2
agO2/DLi333nxc1L6vBFSYOEK+e00fFHgDpiuoQQdo7TjAXeE4CpXKVSL6zLXX+XDaBir5jnGw9PkUd2

EqxQ2Y/yjUyFusra231sZda3b7XAvtILd2nYpu2Wgc6Gd4fZ1p/tAY1aR8LbhYO8R6gIqfxLP8Gd8hIn

eJHsR5y9up6u70fmay5ur3Y1OTs1u2rpow0KHJWbbk
BJ7MHox65F/22BKKf+k4+bz6wpyVShUQ6ffpI3lm7dStr5vWsnfdKGJT1nS6xUsswucPBpicy3AXm6mv

wtPxsizTHwKi/nMUWyEE6nDEVP20OaEke9F8E8VlUamiwI3i6JzKhS+mE9opZL/h1SuxzkUirLom1Haj

Kfgd+Gbe7CIZowiGqsgGPHRTZPlE0tZf7e4awan7ym
HTugEhtAUknrTeGb4sW92S6rP783GNz7u7hwe8tiQ/9s/xxvGT/q1lI87sAgLN5walSAWgBjk31/DFu6

BgwR+zR/pB0A6BNqql7ppjlsZMXWPR2te4JW2insY1g3NV8eF5KfMcFOObc1/CaDoHGE0TjzpIuwwgll

FYfyRUOxcbAdm2n6ji9LwoFJwCGAfRRCxADTpr/tQO
H+l3s1AglLmsRnn1+iHaf2fVo4qtdLbQLLxjC8sOijVFgGreT35fSkDOSjAJbeZB+lJaL0hntXZ8n6ME

mF2FKWBMrnJ2S/N0Zq/7wH2zGV54gIuk4Bt881uwKrGMiur5wLBPbw1vUlhG3Q5I6dNPV0jvDNNQ9WQ2

cLks9U4GEzdeE6BXs+42/AgfeH/KVCaiWobJ2Cn218
hnDf1PmSfeRqzREbi0v8RBT7esMm8d1Yk3UbU+mMdoDH16VlluEKH68TC9G92JiPR+FKkptkapV62LzT

FGFgISa/n644QOrUeOJaSeFK9jNGAoKSGdp4Um5MMHZjJNgonsHE5nrKZqjE1R25TC08ieCLZlhDjiK/

owd/8gcwy7g3tIK/o8OBUCWBDvMl6lpng55lI7HbT9
HgZIqtdHma7vMvPa8EqwjG0z+nsLwUOkP2Y5XbRV8dNI3i1P0O/O4zuvA5012f0f9mcSRn5j7irZuCYd

B53VxXJU56QZNTNDtAxeixOhtV7CRckfd9YQHyduv0eNL9m/HylnFju+UajL1FOA9pxloW31KE9A/HOw

fV5qP8v4ivXusID1tGArvAoBoTbFbcdcxJVNsHRK2C
Ef+K49Zy5G1i6ghEJ/f0tWPu3/q/EWwf8aO56teTD24MHhq0iKg6PpeZPjjeazt9hOZ10FRyREu1dsgI

+cHOeec5C7i0A3ysrCHT6RNFp95h6/v8AtEV6dFoQn52er3rOIunL8QntQDZm6OOTEktRkkcc53PJjOy

1w8WFJphic/Pp6R/7MxPtx9bDJc7+XVD7VBwtpGN28
jnKLl5tsrwp5tuIKk073SOZliSAr5ag65lSFaW/n24fKgAQ870CUhQ/ZIWawOuu8Lk0cOYL0aLzMKZw3

25U06/uHndRwGrem0nse1t+cowWtqsGkFUiUng9crlfY+6r6hHzuxVdqk7KkXzlE7rpMmGCf62TUESEc

TgFHRNrVUXIdHzkBNFEtYWjnYukkp4jNGSO2s7vcnq
TwqJLYfZG2HjQTxmIAEi2W41qGXZUp21YZ0IV27NRQY8UNKulT6ICdwAXKn1Dky+bAZ+rR/eztrQh6xy

O+9g1xHxCqEjgRFS4SMUeVJomGuTzDegZ01gR3LId8RSLIyCovvBzic6Qh03Wk1g1rF5duNu/STbc64r

5LcSzAjFngiyHutmxzf52mfMOwkJk0h+ufOZZsuSrn
XjSmf1KVquAq63hfzlBc3k7Dm3EW4uw8Dp8rCynoCRXcr1Eutmuyp8gbIXkjJ9F1jIRG2Fy5F2O1kC2q

1XYzVEloHlZ8OFzZ+kGg72nlFcypA4Cd6xiET6L0+Ke63spSEKZMXSSd9ZknoYE6k+RcwoC4AqpelYy8

RkWYZ4K8Ssb//9Br7h+gmnHkZipNfzlmnyOg9vMAtB
M/jpg9t2s3l/8uhN5/UC4CrFNlPyx19OfIIkXcWTCu/e6x5bFWcb4TH485OMmTh/BvDSYprP/g0Rs41z

ZUkrPkaYzO4uD6Bu+uEo8le7IK6KNd0N1wsV4NB/rAkXGUvmMoIAgj5jDUh46OwBD1pBUkCZU3PmMACF

iCcprMRwJqZuCR3D2HmubgvCjzeyvAiNPi9CypxQhE
trZx3OA3Rf4uJLJuSRcT4gVsYhOE1SpWE2N1MjpM/nvxVbcJCoRBgPbjkxlakxkHis28ofS3Y3un0JzF

VG+p9nDO80fDZQZgKPXCT4LON/eSw/qsyFJprE3JGl5Xl6TbWI5beOunDfACMMqYsiaFQkC/NF/AlrVV

poHZhQ4YAh24mZ+ArJLx6YEo791tTnpMmPXtcfqvpm
64Nr72Zf55cgHl0SUXu4RLIRU7r2MRAMlXc87OSdOe5k3rFkSbosOOFMWJP0G82G1zpF6+xpVpEJZk6D

XuKKowl2nA96krmN0r8r7kBHKOZijFH3l6HtvJMT416Oo9NowFN9bIL8EkIBbxp68a8KRbWS+aL4tR9/

+1XVDnTlVU054HrRFMRFY09l0meN4Aw+P36E+1mJ+2
Q831Ba/2ekhzz3R1YYDYanyQPkeEqh8vVvGAok+pyqgtT81MAk/VEdBcomvgkb7YVmzZ6XBkgcnuizBr

mBoTpjW1Kp8P2Xt+J2+kYQ5crBfcAtw8ytMjVwrCuXvnuGkd4y6QEHkEnBESz34rAq47rm1vhuvngBvz

eWpolw0j+8/QZGmAsGVW72SmzFYMVYK/IbCwpFY38j
J273hSJ/GeDKpyeZ/QElycxQQrUPX4Jblrj2qU7D9GQ+0RFtnKqL6MqJTHE6vkGdjBHrD8pT8ppl5+tR

jEAeUjFoXXdWunkwsBIStmeEYox4nVSHp/3gUDK0anTWbGfi+v43QmC8DWgqboBP2EStlH3zCHqbmxXJ

OiXtLU5bynFH45OBxNwSwRSVQi+xZMWe1OxPH9a1Re
oqunLyT+Eai3Hpz+5NHWahvzjCqbJ3lrxnOZgoqnKpjSKZFFDT8Bzqmbfey1bh+ybCT1M/1JzW9j+sq7

0KXbDRpJbFONatK3of5zg0gwzzwSn1dQc6C4AXuZha8K9YNP0NTHnqmDIi+5PiHgmCcyo2CGsQjNfiBZ

wRP2k52fxNfumnOwzs5jT8ZHgrszdW5J6/t/coaNCs
lxfin5lm59QL7mMi3B8fx0dGGSN50X7ZMUNcFhXy810HsApSi/4Osvmk28tUb0SxFKNDr6k6QopfSV/f

6M+qxXkgWngtfr1xAIn8BIIPMNtlEKviwO4I/sgkqlol2i/CNccad3/UKsQqXDAnKnDH/wj6FPHTPwQ5

ycJcbRraDRlvZ95MkOwgU8xE6/IMKZHBywkZzeEG+P
QtI27ELRudqXgeC9TtQz3UQl0FlLneujfOMX2I8dHY0RCWU9P9jKG22uNCYzZ1jtoXtKE7UFuNUiBI1E

Vu4bMN7JB22QKZ2BovxpKOi1BzkZwKJckeirVdT/QW5jLzY3LyOd6LaITvjGsiezb1A2JMygN+vyyR+i

fCpgueA7D2w2M7WlNZtDLt/GMlDslqCudU04FJveVG
ECPipvObgz/OpliWmi9HNBaq0A0RHtgz+5gbn0Y+vfuL3xLQvpukZFKAnzHj/zbDs+2d9tGGBfXZ3fs0

GusGaEGDXtBgeJD5Um4tPMUesdNnNDjp/LRMAVsasvprwY4SIVgN+fVHnWxEZ+4K5a9QPWaRUiv8Ojd3

5yPe9uTsVUCaWSjprjPbmXqR0jnt3d/xl4P35KKR84
d+XzclUR7Dr7uWJKh+vNpAzV+Cm/5bhEjLdI7UZCI1R3hUJzH7+9y8+rBXCzYNZRxTDUK77TxbkJl8p0

f/XmqV17vFG4D/vj0o1k5z1C/p7XYszcu3Ca9WoYz4EKNR2VkxABz0FilgO4sq1Z/tct8dNqMkgeBoy2

SJbdfMIIw5KR5XqYyt7SctDLsJKJPS5e1wDlcmEFep
kAH+rZi5QVpvu0h3s4bN1r7LwoE1tpyQY17UBloww7QhZ8w/woSt4vsV5sd10ZhN+RHpuqG/ZwmKsyOT

cqbq2+fzrEqs2ryVcHTyYkPNO8HZCz786OPXjoYMelhL8E16zRLcuwzJaha2+RF0DcFNBKw9B00nseMa

s+UnzB2ZiskHjaSfi59H5LnFrzbafbn+lSKcy09Yvr
AmBwy2xS7Glq71KknsfVGYfkHQGLcb/3d+QxpTkHtTW5gEQiyBtf+0RyTljCxNrs13BMVdzBG919KBTG

uPqWPeWAdPgpBeuE2mrWzBFjQWpEaOZHusjCbMS3eH6Kz5u14gyeO6GT4VGrUYb2EG/LvqsxTvowdoxL

0GFLMRe5JZ9gBYnH1XBFEkisrFnLVQ99To0V2xUJzq
gUR+sqfJxFv5tzbCRSUGriX8rIosxq+kr8loO5BDk7Ni4nsfUtGW9ogGxieUI/ybTv5yUH/S2MZkLYRO

emlvIAfUrDQSx1Ot2raaa/vDlJgC7i3JA3RVb7Ml7m/M3I071R+BCn9Cl0D9HzNrokbn3sUmKRDA9T57

O2pTS8LwuPlOgM+YRCw+izSGtbH+SXlfOZSlMTyNkZ
A1SDwS1xjCqmMB0WHY9fj5hYpL/y3FnlASqmz0P++ov1hxJb9LjGjmQhJx3wjyG86Cp3lY4rzurj6T0k

5gqcaqtyjY/P2ExjiNan/tKnfoQGJV34/WgUp21YGuXyEacxrVGqZy2ZC6/LS6UCrSqyS9VNRAWZ6rEC

Ai5B92+YjB5owR4QFa3BZvafOOdrW8/eD+ZCQs44cE
+QoZLsCVWvTK+mWWBEzMwekCVDy/+tQbogZNWX8oX2SQAdcAfE1N5CdwIHkX984XnqPi2m5WMkc5C7zZ

sdmYV4JmENfQ/1q31bhyeDehAY5BDRENDz+sexQE3AP/YBhjNDriWKl9Ylpe35EkEmGpv5ertb/4/3GF

6dLpYz867vC8FN4MAPKa6bxLkmXF7hUrD7VApZFebo
qorlrinvBIAi9Z3CKwdvJG/Dx6HgmbAsxmuUDzRz7ZIsSTtt+Xo/64gOl/oNp4/npwwIAYjZOH7wWcki

re9HcF2W8zYgez6qQp0ZaxB1kbNE1oY6naAa3lGJHlIO4J7nBIIMrvhiTDnwgpK6T0MwCfRe8ugHP9nM

dIibJJagsbHXLx6+aopkVX3YbJvp1d1e+zLaQOulsA
YUWOVCFk4xknG2vUX73/JpilyM6V7sSNaL/pf3SWBZ3MDGmYu0Fu7uIMAGZuFlWmJRdKB/0r5Vg8QIlk

+s3hLir8YplH7sT8i4JeQF7paPIA3Q2F1PADpTwT9k9dKUUZxTTGyRsRFFS30p4pBfhV/M22323wfCE+

3VQNNmcoyS69zP/8gXfg7sFqNGNBLCj37nzTjHLEFn
zTI+kqqJ4tOA8P3R+q1VeI3andYIIUsJeeekYIQuTmfyr7ZrJHKpV0NRIujMjSpZqTCnnl1jzpbi+hLQ

NJyduvGCDlOT2PaoI2YUpkAGgZJkDTuREhc+3vlBb/O/Da/ahmeFfwRjdRb+F+gjuL1dE4iHQoZbOWr6

7rNWu+QMJnP/dpgWrS/kLJPH+fzivBvFIkJp9PGBe4
wO4wnJg7fguvdtnowUX5TJNWCQoEmLWkQu4e9ytN5fPqsKrUQEFMNpab/zE65IDH/lvHp1OUld31BIeu

GxwraTOPABUP/qL6rxEl1J427O9TqFQyGuoX93tF0tEnTRvxah3PLtoj/NmG7TUSfRocnMJPnxt6fIdS

wIGiU9EZAPQl3/fI6G5askrlim+IU5bM+BLxLAgYXf
yZyqtLe0nyfoNF7ODSzoWLW75rrLJQQi0JqqjohndUpowtNHTMw+NcApShkNcgEUDB1XMKDKoYqmBKdj

7shzFIVJwdqFTRiK7wwbympt11iQZ53q1L1WtdhNc5UBI0hp78+bcR/EI1ticG5abXbLIGMT1v5WfI26

a6dJkipskXWKMAmuO9hHzSwM6jk9uzmXJrPl/L0aHP
s5G6j17WjQNy5M5/mjdd1Pr8trzLs8EiUlZDpZSn8kyHn9UQ53te86f9X6mgPnJSxUmtdSGh9uy6JTaA

3qUA11wdR6DaNFhtm2PYd9Q/o7W8THOco1WXYo+0hQw9Q8U0vYRP5GQVSpPT1YiJur5w89hJdpGGb3n9

ulZcNrbil1B+5a0f2f80dc1CIXeVfrmK/zl5mlBQi3
ufjxNlHOLzey3sHkQvPzfwUg6cIXv8kkslvbWB6CK5tQBhxzfdgS3wzG8mnyGkl7gnw38E4NcvH64vqu

Ru8b6kiGv27yhaSw5Klnyzl+uNRD/8U3jf91aU9eSN2jP915EA16i449nDr8PS8hFAxWgav7ojH47Q7R

CDQZ9F5UEahKKy9v+/sAPF5MOSL2VcF0bkYUT22t4k
bAexkf9OaUiEu4r2qlP1aryOZDm8BEYJCe7d/UXWmf+Swke9XWTNpyHXcMJtwXDZ5SwoatdCxGIvAKjE

2mUoXVLtjUoKqxjFIWejBweMuJI41jBSm5SvaZkmi+1nT8JkRcwJQWmMh9S2PngZ9DCk/6Zw9+CURy4E

CR7vqP2ajh4cNaIc0f9hWHl0cvECY+yMzqJth6qUff
iR2rV6Yr0PvOctU5VelxBLuU8qryQ0iH8WjirBlxTBdO+p/ytFnMTxvK8CqLKjNevWZdXylcC3kDyGEE

UUaw5OmNmd1B5xy+dNTenXGgrA9PbWx6+apc7bkyxQq+dpdGQIZ1K/8zeB+lPWTQwDkVOvjF/PY64Tv5

lWrJlA3VxXa5o8FuFepBakFF4I36wagRDicMpAESqo
q0vy4lYOflzQL2jTghQdkNZVSR4eyP5Z0joNEzCQtPk7Nvhu+Emlm9oCAF4Etp3F7jcbC6SLoV2zElLg

PXykQwX0mHT/1qhjiboHm+j9ugz9m7L5HAMz8s2C0qovq7+iMQm7nloHhgL8Ad+Qgep2vLa+ehxr//7d

c3Quorh5r1borIu/Wiz5uTJitFSGftSwUF3z+O3ajZ
BMKj3AKm4tqOq0Dc+5geB2sl4n/o+iob60x2siUZwGH2W8LiL0QvaudHK8J4gngyjMacp3ho2SMvhdBO

cDPRyXs2iyh1O3sH1ABjgFOq9Wroa+TmIuwa8TCj7KDOwzSi6C6Oan5g8yGIpWrrmkmVUehBKMaEliRZ

7bYZjjUeTnhmJwFyblXm9ldepGXnCafz9b8HYFZhEI
cyhRktY376H4rZO2YV8kUPKqtKBAYqpA2t0NMXQmjQNdHvD23NyGZBNbIKAHIvmODkrNtUobl3YY28QR

2phraDYdgyhN+Sw9t28DISUyMijSuonLibSRN6nkp5khTuWrDUlO39iBmEww/+RwQMD5nNRBewcuPgw7

IrrwV9q7XHUjbHauVZVVsmcNM2DwdhmEr/Ho8PHpTt
l/ZvlpqQmwjIxRKUqcDAyYn48i6u1cHdt+Ue4TavLDQ6pMcDlxvpt5MfValPT/p9GbHK0eodw8JKC1ul

ZqeitRrnozhelH4/dossAMJe3mZnKCyWI3Ek0V4UnvDSRVtCmAnfDRL04GHSPd4WyOkxKIGyOfRuDWa1

/G+s6jjiV8pthdYORLR5+s7S81sCE1H+t5m8lWyFhr
fWgUN4Yu+x1Pl0lzNwg6zStqkEq9Ov3z02VvffyYrA0/v9ppujCgkgYKzfxy+VIme6VG2EDgX3ISVE33

D6ppTCLL02vt/LQY8EJne7TJR9tQxZ2aezgv4OD8Ze1DjpLLH3AWR7HN50jJdsyGyr7NYL0zPnl0Ag5u

UwSsfby8AaG095YC4qG/NFX8nZ6zfyMYydLoGuJHwv
NIDgjnQ1eFgQ1G5kdOO0nks0AHqeIvDcCyb3r37lGfeLBgJn3LzMH67zzv5V9Mp80ROXx963lINANrq6

X/UVBf8nRnKy8HyHCTc05LsQiXaKizbm9Xbg4RNXFKGI/J9CyW9FSKjfyFIYpyNz3YK1R9Bmh2YZN8dB

p+XGlME87+vRek82HlKtOJVyFUEhivcwgmEzCqExXd
vDEv4mS14gZxuuDUwIvHAiQfBbXQOZw01Z6NFM4AjxedrD4UijiFsaH/rppkv95cssaFozA43MTYghp4

klF1+ki5jBS6hRMu5Jgp1ngsW0gYC5zqcANdAORqslEAlymP0CysORF443O+j46zz3iHC8N4LIABfhmN

asiGq6J5tW0Lf4ixuUlBjoFUVXizuANV3KV8CjCE9R
yw6s3adn19GgnmVHQBqJDBRAwgStINxwiy8dqHXiMK5TVgqlRpuh7+FAnYaG82jPtHhV9jfxwN4/t/bf

1J9VtqUKaptp44lMbF6GcInzHAyYme5hmxx9YyK/7+f59vueAn2y3mJr8svuy9zbwvqZuAV9DF2JoANb

3SapljOpYvF9oTFJhYmRJioSBMFQgCR42wrcI2wNih
+woJs0SRf6POysy41BQZ85dhYDZDE7/sxT+zgCojNxrSB30QfxTn/EdfL0AH9SBYpdFQhRWVwvtvxeON

lSsYir7jGNd/KsL4rDdcMhYwM07OelN3QMuPdkB8hAOAVjXx17TunisUxNsXBFms/lmy/48741vOgXJs

kWbS70r6v5suOs/yeEoIox8tX6uyQg8okhvGzYVoa2
IsCZkdo7dLkjA1hj+omDgnrgW5TKti1aE7I1PG/DmUB6P1T9SJeomgYV0hGUjaJ79B4lIXUhKsAhYfOi

wfALbFxAp0NpIfASLIHMfyYnwezbI9tGSMQLasDOHBCRHsK4TbsmV2/qdhXb5+wZ0SUoifNiXuCmjU4H

LJWYU+1XU91/4Koa8ayetbAEL3w1MN3qS/EeuFDpU0
wSEeEDLtz4EIL3y6g9uO+NEW/zCUmxFekBa0VQ5bQwDeC+YlxqUUKn0qpbymyTTn2V0nSpCKwrIYtez3

7nYXGZIyxvkOIEu/orirYp40xhwYCh10TK55sSD+6SJDn8oBdSILQupzKdmFM7vBt7NKR9hcVhbVM9+p

vFjN4NEsgNzCmu+UCDdL5lUBIsYfPYognS+HSZ/mDh
ThNp5ySYmUismoIKc9wzcg7nEig7wI7ZhJgHZuvg008QKhowHX2NU/63hNMvM36dUxMw7X3lS5891Rhu

3LiKhK/dFEEnUqDc2pW3KNrJ/Rx7+KdeenaUo7qzOxE8rTRzj+kFb0FfZxxSLEsj891Ro96Ptg+gqw/i

9i/CTrrLQXNDV1HYb62Ek+3SlpjfawSTvF8+14k6pi
Z2FV8yVRfBFlohjal91oZkzskm8vfPvNxwlBae7AVW+fotf+0b1NYx6HAoobtnQ7YcFN0XG2y5Gj0ATO

VrguTzg+U6cNjOvzO0U+ramyu+/Wr5Q46yGfWI55jZds1sSLd/qVKJllnD0XnxTlnBrghH4D8dlccNY+

dBOAuTo5QhFqlTo7oA4zcGJvhX+6rfYUF7q8tBAEoq
D03rXvy2N7AjScmDdAVq4XkDSr7SZt1EHC+8r3gyQrJGMaHR59kN2KEfQABYvFZl3p62uqVaG3aQu5tL

fR+zNxb9nTx0E9mbLU/f5I0DJSX5d71rwGcY5Yxim5B/SjK8ZrazV348mM2oJghg12zg3lMS1iGctB6F

u7Vn0rVONBB1FLyWIp0tov7i+cIzOuASE7zWJBZq1/
4Zw8wsvsugw7Ki9GBUQZHhAJGi9jsG8cNVp73Lr3BKO6WitY8nHzRTCtFYbT3F22a6+z+k+yx7c3Vr6S

ugRUDF3lmnR/wtHq9N2izPJgzqpU/RPY7PpcGzezhr3HNvXT+U1ndf+AatupLufRtqHNd4EjfDU1wbRA

PPH44es/eXAJKbarnWZcMRJY9HE/7kXyKZOmvphwgl
jT+TXwhH1w9ng5meowM+1ftwbUCoKo/z/95f/BNKeSoxYMgselLPHB0iWeY0pNMZe3KCs4rARsz3cxdG

x8snFAMPDK1IFuMgsO2AqqVd9auzTIxrR8IEiQA0PhlMsX+SS6PMneqtYnQt7o8t8r+bUoHOTwl6G4to

BjkiRaSKxTPU5rEgjAl0hM78goos+MgTzJOEMLPn4i
UP2syOLwjoF0+sUsHLtdoegTTrtN0GwYj6GYEK9e+hLxeOWNRuwh3nROAHmUAHskvTiK6+4R4/fBayA/

Nw9pE941HiEN4l+ToWEFmpeUPDdL+lDTPo0UfhGhheCFMKUzAtC6uSt2U6exQQL0+s9H+XnZmaMHx989

pPRk6fhe8Nl+IEPRpGFboUu1ikq5lRCij+VZrBoOSV
gf9mXydH9TTcTckbxs3bvPqyQHRNkcWE3qO4UmkZGWbQYIuqpSPskdLop4vFFxV/4y8dxJ6KjJMHnEqR

cxbYI6Yh1gRrtFdh+oW5SzM/dSf4Ck98pfLRgBhPF4isiF842B3OJRPxAuQs3ZfCaH0pmhbiUkNT80Ql

WVziIvTG4lhlAYRMnQ0pzAj6nV58rN07LnQdEBUtdH
LsAdYGBhKFcAiSaW+gyQ2pEF0wZDvwYFqjWKuM5aA89kpWEla7SsY84FmmAghfhk/fdg2IgZ+MVG3hAn

FzxGc2RcgCmwKBUOY0nW+yDDZd1k9kLFxzBXqyTg7qu4TIe+CFmfXGVVfgNrS3p7FNWoQN+fD46bpJSH

Ghh+Z4dU8iNfFOexB8qvjUL4wf25ebe4BA3YgaYDU5
NqxFhskoxfGUiCttDikN2fde8CNiS/OJ/QJvUmU0tOjEaNwzUsSu9Iaj87hia0WZQMjEKxj1LOXE799T

Fll9Qa8FqrgI6cxWOJfoVbE14+bU993wNeQk1U1FeuRtW/Travis+OzEUlgcVMd6P9nMO2YUuIIRCBKOd1b

Qtvo9/emEWVlVI9TvTCAwmo61m625hoLrSN7aMV3iG
+xFSTTRXY0kIOKAN45z+OxQFAjyVMbsK4MC11bnuQU9B1WdLIXgiXua0OQNQ4wy1fyEE56yZpMbcizIj

ZvgPHaZ++pY0oMcpWmTRIxSm6BEviC/Qbj7RTZMZNjWQecWhissOc4XjzqFuwEvAYCY4hkBi7Yul6HDL

FX+8nOEczETD651akfZmIh811gn9nOz70gzQDtnL8N
zG67cQiQsvf8bH1yDEIGzlHJqcvg/KLTVvtQaL2d6c33tdwEt6/qOc/ZJtlBhMJxSbXXjAzBFZ8AKuCs

COkjVL1L8MFj87RWe9Dvu3z6cJ70/ZjIn6k9E5Kvucg9J+Z4xvacpGY3xh0636tMcaidq1dHemmqrcWp

WfodamYFyAhp/Zm9hLt4ax//kJA2Iy8RQVnmByq0Ds
WBr1FAyYq9yXQKqhIQ9KHWVvsa7Mt1vt1uqDMDj13ns1i8DnfdbyVZwSM1IEnUFeps3cRQReGfuodrGf

63E9jVvvWF8X3clKNuJAjQFsocefor/1catvmF39aXuNNMhwg4h9gRzkyuHOMzn/g5VBBCByXv9wJ2yq

TDcUZjX52bYRHLR5xaJZ/ZFLi5eSv93jfiejZREUeP
FfZpXr70/d42zLu/EnQf3gIr0Fm3SuoAu51yac0iUKWgVzWtH7556OAAgRlEWb65siFF/JScvx8bcdoC

t9LA28JUPU9T4BW4xkmQjlOnQK0Qoolp8aTju4q91Q1I9VAScJmU5Suc9LXWGdwols/njtw1qZXcc8Pq

n5LBtiDY/j0Gx+cWI51WTh44yCHCz5tN5m/LWrmDJM
FHDrkHCxSruLKAoWM1OAl3gunnNousO2J2T5F8MN6/y3E3DfpFkjiIGzn1dLGdu6jD1FxXYSm9j9yE1C

/kwcTEqUtFO998lNTX8fIVL7ESsQSNZoyPSnALXn7HveX80vTWrd4En7fck+bYcTMkPr6QKcDetM3KFx

UoCXsR1IOsNV9mqJsup2PwLrRCasIwUwdnG/rOQSsG
XgnLOLu2CxarYKbA7khBQs92pmLk3I0m/Lny8rCySyZxuFoKpkogoU7yr6CXEAFOV7r5394P2f5YdseI

W+Quig2hD41d+XJYMBja9EgJGl6jdt4gjIVH1vt1+sy2WWk7/qezsk9kp8IZWs2fJI1fCFtNp1pQrYPN

nxEahX98OI0f5CiDW1eHTu1xJHWAwdiWd/zuIisdGt
k3cMcsr+++HBfxGu+Ve/HucJitsqBClTa8toLdyG3hN/2+2uDS6YdafDjWHrolkiXhC/YtKZgpSghiK7

qa4Z9/PF/UFg9FDcahyeDX5MVmd764uFHxSqih6Lj3KRKnrcqAqWyNi7tbKaGkV228k3dpzgehJB7x7z

rvs9Uo0HtIW4AjvbFPfj8XrlxToDe7jUXuktpsupsE
L0fgaW97ao/YuYPcTkSKvtT6PQliBJzKm3eTLylju/aM5xqqoT5ZTuMOrBO6HefYKukU4X/wGArS6aBq

SHp3cN8cwazRRb8JLK+/FHagVXnclPt4xZ+dkTrkganY+UsuSWQ6d+5BYPmqMPW9vDsH7qeudUsMLA/K

nvyBKyLr5gflXjav0YaOFsi1xpOjmYYg9u+5qwjQQJ
9te6MgxKjyOf9ggThDblyRFQyqDYZ7wFUan2lpp6vnz7nrEG1XU+3guW1y4iAFewzdJkrm3/MoMIMxVr

8IWOXHU/F7H+NxkdGQPTW9GQde9g0KilfojAJpUsdOXelrrjFvanVwm1Hb8F+W9YRUtuRj/sh6AtDynF

dLfNH1gs4ZPjsq5AlEqX3Dy+3o6aq+bMhDoldpYp79
vYkLYbQyVUXLXNROl+r9Uf9bLx9xjgJT9RD1X0pNJqLviVOCo2OlB01vj3BMlZpnOtUxm36kLMd1usMz

81qJTbMMjBnMPMNehQZRTO6UrskIOSqyk2a63+yV0AdHGS4aAs0vx3SlAET3kjuRhQtdWONV845gCowp

gcTpnfUVo6yjwTHPp98g4QjupC2xjkXDPtwQ/oNknT
kvIzO8kV/phe0mM7LzJOYWRhFotQg/ViQlvzFCC8WxPoaM78zKSt9zNAnvvAE4yyzUoa+uXmsKxAN8ye

JWltFxANmS/04h2SxbCRH/Dak7xZ99phgVSkIKy7HhavpoZwUefMc5xG62knN9Lh4FlJ8NL0b+tEVQ0f

Upv7eM3wb7g/Jlm6/y0X7FTSDZaK6vy3PH3u+giJ3p
u+PIZsrgiRSU4iMTc8NqxMlUcMoCGUsiFE8yo92sH0O1zizSQ6smojSBHl3tBRz0vT3W4XSx+fJETcPp

5fsUre0aVmUUJN1ChFqawump2ecb58089MvNmUum3LAv3bqzEDX7izA5BxZa1552Jh0O6+EGwoKPRu67

rPBuc8N97QhHFrnMZiHxK4GVygtkgcVELxE0XFVP2S
iOQR0apsL5BzYxX7qJvhtA++SWIiv7MEPtOmq2xQVHYGxgJ1HVxWFDl/fXv8Tq+8ZRDgA2bDI8/SDEV7

b90C26CQRgPwllSSe5kAe1py/QTwoBJabpCGHkC859xBJqqOZ7zmgj3gvktTA5YOPkdHxO+uFenJQ1+R

S3VOSIgCdr+/DbFVNP1gwVl1MhTfwNn3jmOPb0sFzk
JMT7Uu8AbIxVLHNo1sysj5gRI26NpMihhHk9dSM7/qwBPqNVXIwGKAICSo1Nt+OXEBUIMZN3/wh9ZDuI

S1YhzmXsD6ljHpzuXmRQBV4v8v0BL32DllaXR0l9rvvjCqFTg0XPuONuiwTDsxQFP0DJso9p2Ww8EvVF

oBDjClmHOS7fhzcZz9V3kwReZFZf9lpV5lqgPpYZsX
H7/MCNydIyKNgmNelmmq0osA0yzU8YgtxTGAS3nAysdb7ZkJNWV2BldIfeJW4Ydabwh4JtY3ajBYfzIR

ytXWT3aHiMaRk37sj5h8/XTo0DxiQIZb0V4QJtAAeF8z9iyZgB7ybLvVhO+LIYVqKEtfvloXkieTYheo

RXim7u3IPBez+5bcUeuifIqPMnb64k+66FHCEDta2L
Wx9ut604gTPUK73h+erypQyAVJkKjShPS5P79upbbYG4rTdRNWjMpudTI4exPLo6TWjmZ7wATCj9iYqk

HY2ca2r/cS6hGDsU6S8qpK49sd+0AFSh2GLq+GtOo9fqJmakxvUDzYxYm7zHimaZJLI8JxEugCJqRhKG

5UOu19ePPt++7QOUsyp7ZJhcAKA0B8ZlOgdND2ziGL
zJDuIAfli/wDr1UKWa7SbES2vvoCWvp4yv33sR4pI9LKfhMqQKBMJTiijbU+trViu+jMhruQkJ2XQJRi

OiPEVKP4TDJ2ChiCK5CrZrR6bhUv8RXNA6JHm39BVJo0uE9M8m8bYpMD+mUMzDlmnj42G2oW26Svep/1

LxMtBdAa39OI4qs3OqDv4HnwefYV0I8g93qddQ9/sn
tL8rfLAASwGY6IK6177d9KZQRznIVGevROBT365sZxur6Iq+9YQO+jTw/2N2pRcwAQwyeh8cDa7pYbmd

hjadO+A3TJY+5Nb5ODVcmsI5N2f7lTuJMQSpIBzwP0hsibFfDPV53gPWwXnx9U4+0uG9glawIjNvDEay

P76O/9kYKouKaIIFrPYwjvVJD2EyZKLtKvm/vj+pyr
JR6PGwp7iK5RviSbLgbayMYFeB0PQSeRu7BanksR/SBWtc3j07qCgLyjdXofDCDjkeA8Q9Z2A58H/ji1

hQkncAYQI+Sz8xebdb+0b7KXobXK23Mkod7Wb6pR3gUB96AvBUqIDUKcMZ407INrnIwlKU7O6hljE+Qj

0WTOO3M/qcBbytyzKNwwOv5WzX/synULUyI44L8Ru2
b8oM1ZKSSl1pVwRtDCeBsLVfDK/oUKFW1mxA2MF2vg14x6+MwEGGd3aHYjCjgTdDh/ubrGXRPHwbyLU4

Cdgp355CQTg7lKV05mcFmYuuGIXiQWEX4b/NLUqo8PEd+/HEGLDFHuf5Cj3HAa2QqjkZztY2qbaTF58n

E7K325bOLiBG2uYV9T2YSLJAOuMm7sBoH0iKo3LB88
iRzGjCbpLv8srmeXRHOyj/iIryb2xQM6uKeyDV/B5eSvoOezfpp8136vTWD8qzzdlTFm+D4CcUWHQBr7

Aj/z6frwWXPdk7saQfgQFAnllmBWpCN/1ApQ+XHz2a+IV8sMbc1Z4OLUHhJqb0CSnN5zhooTcMw/yMes

BK6gg7DY823viSQdF95DXuGpaXX82RBO4Kd1/zgZzP
M9PdG8rba7bpCsg5rLX1ybD/tWNpJ7cFZZboqKU96whZHS9uKoYGC7li8PzaT01wkewpjAAiueLKg86o

LrveWlKvV1DiRGVAnSrquK+3ju6VT/pLGVbItTjbkMm/OSeSk4WY+TLS7/9chB2S9sAO/6gxY5KCK2gW

e+cQ2voJlnDDU++ekEPk8lddrGCMRdXkyhYhc2Lzw7
ZBekUxkw25/PrRKyHgYWHPE93OnhGbXSxzCdSeBj8wziMfo6rA/LuiAjjqWj+XrEHUR+YeFQX1gvFZSZ

fXYPICVyaiWt9XlKdBwZLoUn9cpgrPhur7qKhpIixHRRIfEWPOJ6INjQ2RKaLh10MkoKF5mmWjPT8uMs

GYRGaAfZ5s8jF/oGhcNGHTJYQtQYSiK+EWbcKd7JNC
bbUfjZx3AfMZRjuU2qid8CPsoEuG+Kzy66X5HshtRZuM+b9/O4tgJ2d0XxpnZ6vn08pZWiyulwBBVY2e

7nN1mEqTlIxWepoIZP2gdXG3o3MImWPGz9keI5nTRkNITYQzW82KwsOrFS6KGg0I+uAIF9ezjHDNtkVf

l3jB28xAFu1inpvmXVZuIh+Twqcfn111vB0udkZacH
lFCy6rMXruuU4x65GCPdf7MXr76tY7DF04zk6gsp+XB4QONULGtGi1fhFFKjaSXVB10k6SiNUIQCGogW

lLlaiyLHS6DhTNCnBIQb9VsDsO+dcFY4uCM7q3Ygl9Go3MpiVm0tQe/uDPyCHf2Ml7GJ41qqFN5hthcI

f7DpSsyl47drZPo1d6px8YYiZWGGA+/a6K1WgvuhkS
GITEFphIc/CVp4khB/+nWsOhcPbfsioQ4LKABR5YhpQNPbylmNIOkbCusBHV1LhbPUmJ4hiH1CdIGDf6

lBLF0MDGp9ax0g2BpIMXpqv1RmtCOemI6rO/lCbIWvnRg8AhyTiYJ6HbxQ92vTIrNQbmk0bzXSVdHfRT

4zXLuknG5qNALjJ0M6mwBIjokzgNr169jbD71u6tdG
DM0goDj3BT2grj41rEVsPdNVdiLVSMNjEJXLUG6sKbryrTvi6VQ0H3lP5uh1bK76niJMtP9rLYyA1ldk

2KpOKpfRtDbrRE8dfuSvjbGRqlyAupigPN+a9CwQjLbWEcE0Z3h1Xt4gXKE64EygkSiQEBMwm4M+uq/4

MtGl2Pikoi0uu0y6BMiljWc+f0HOQ332iuliqjdR2+
3Pk4dcopfNhyEVlQDc3yabsYy1NEfi0LoSwibHYZHiYwFATaub7DK5hfr6TDk1F/qqnyq8+2cOZG9vku

Kb4KFVmbUH0riWDB/V9L2BcxgAj1yGPY4zcdrUlOTxHAxMs5yM/WWHJcTnCGl4QEU8zuNfzf4v0drrRE

a8RyIrPN4c6Lw0PAZdFRVaWmUyNbSTZYFwSEsVAMtq
+yjTbCgO1ltsuFwnqYo8jg5wccvS5SUpkelhn07LU9TSdJb4wyvzAGqlgVT1Ab338yKMpRYQ9GH4H7lF

DdzFXR1sNqGNqsob78ttTtm52s2PVxhqoWH3FUDBJqZAQYOtRxdRi4poCDPoZMEbZqdE7m3AL7T2JRHO

+/pdRsw2hmbxzD5/LJK24aM307xCu2EVflIx12yBUh
XG87UHS3dX/ICMzuMVG2nR1Qvyyv0lbyjU90DMgDP2R4iWh45rO70hz5580dDRGH+9vBep5ETdJ92qow

9YOIFg5CFUK9jX/Ibf/S5XC/0paRSBLhvMzeNX3BD4sNcy8o9EyMPcqwHGcsoWV/woLUDN9s/zlSI5FH

XoWtcRAygCE9AiSKTkjR4+wX92xOc15lds5kT+e8Sv
WHepB/sm9G0R/IGzKTIT2Nd+MwR5cnVIAipvtVUf8D/YvstPYWY3oBWv0Cdwxih2VlgIxQNiAwrekLOO

KnhvmsRmSUz8DEM0TMVcboaklaihHCstjUxePGNOnTqeB4mV0Y+it2+Oa46u78Szrm0qF/VlFpNYieFK

yFJYZYF7J6m7+oVhNTypJw3r3rCFQO6AHWP4dc9IlS
YCn3+S8iH+WA3MUOlWM2b9QFy5Hwd+RuH9XPe6WxOAAqR9n87Gg00VIOKmy7T0MC892iotfLMiZRs80P

rGAH+nx34bqveeLyPGN/1GRsgQ3Mi6YvMuVXFSnxbleTdm34zoIUMwCZxGZcWpNY1pTpseSmWcSgiA+M

ovyMyGf9PqzvhLcaNUODjjZIr4gTElBLODeHQiJX1a
Lxo0YfYJy6xxNdlQVU3BQOdsdt8YGJtoDjY6voP9CK+3wBZgTSt7T0kIB7PrumkwThyXDQC2WktrIR70

aUhmjOU3TjY1m5YjVt/7U26ejIVR+HYW1poU1tL79pvBQtI/bPVljDnLBaSStXT7hzAcBHTqYWVgmK/4

/EHgZTFi7bgu4eM1BkbizUJzt6gQieV9RGVJL1KNZC
NM2TZ8n3gwf5LWPKNHJmMwFHFsmHb4jnrrcUDEOPBhkorAndi4TOWTMu5TaUI2y8yUXEhyd/iYdzaI8n

PCjLxwlB84KeWGZ1Ok/Q2eSSjM6M06ZAGMzj7v+apJhI7/iGlMTfiADoce4QqPkOopC9U13Atbh523gy

MAjfzr2YuJSW9R4o9XTQvO9+c0rUNlpMvA5fSJyjNR
dhc5kfMQ4qQtvC5fLHOEzDYPcKBOP5hEEjBx8+0o13tllvtz3drBD+2yMC33ytr0kYN7fom1hfEmQYLH

GFsDeGQ/1fnPpcbV7LEO1YfpGci1bXME6ATNrtB/cNzwuCs956rS+EKeQhzJk1mknOnYYrc3BWNn73UN

eOLwsb75Y7qG/wR/LWMaRged1HHHcWWB5SJwrZAE0e
b94z4ShR3lGHLdTOc4oFLUxRk2FTLcYacB7ZGFnGBvUX0grz5iqWmbx73zWpe1o4fX1GiES8RjobEfzL

h4Y/WoC5cKCdja0KIwfGji80TEYJjDiPbBAv4u0uTRbyjtaUXq02LdI/Yiw8Cc3s/Dpf2TUX1eZUKytr

5Pu9Tb33Vnp5iqYDLDoAuSwaWIoFkazWh4rn0suKu4
3ksV/S1vQCi53w0Ya2Q4b+gYhVQ8IOXhtlwiRVDlk8iX4U1pN3SrDmyYCf7pvLXyhkAZJADV6iSUyp1k

OxpMHlFOcVVnY0783cnGjsmgmtUKfKOG3BD6Ru0/+4JIpogn37HpBLXzROWcqwYyAGMNLqJ8q6sGFgiz

Tg7cYGZlQABxEjsoXSWiaeYIO459LE/W0mZFEkiSaC
5of8g1PtA2g3iL4pVJ0RfjTxzJMJnmKCcddkwF/PEisqNKQK3h/ce2+X0RY8X0ZLOggPRnEQJfC3u4Gs

S+x2AFSvOENucc+Gyzt7LcQz+F9t06z0c23393yIu5Wmwkl8wkYPxw3B29rl35wBL/dtRGxaj03tpbqQ

WXFPXhNEf9VU9NhclHHpWPmSrE7daOuxeX3te5DisU
qh0KdTsY/lqGvzivYgtafE1GlWvU6FGda6nj5wE5gFLN3csQfzM5gXSK9RRQafkVK76RcRs7K3s4QpEt

tx3I2Ryrwi1ptfmBrZrO2AvsoCG9Cv2ixfr8etOY3OyrQfSLlSqrbQMWZb3chANACIA9O75DyDw1Ojf6

ON69N2MTLk0gnpbR7cgeiMQsPh71trg6k2HpEq2HJH
8cKaMYiOWBn3nMc2S1B2Jv99Azryem3BD87a4eDRpW6r91q2NY05zrF7OnFwRzu6uymNeGLz5LyRESZ5

2of2uGKTN26/htmFjtC0ExyM+C9+4keFJktbyb2NN65s1gOZ4XDW+7hqPNT3vOwFUwRA9cZNQMj2Y9g8

aeGmo1DUFEiqk4xhJqU1m+R9l6yyOmSixMjwiQvWI9
lLwptnr63UEI3y9PQZDuECoJCXiishh8I1mIiPvLoi1v7jgdDs4WSZ+gHvh2KGkQQlc6RUQzKfxLrrUo

iC5iINfyAUh+dOy6+Vd6yiL16F6HrRBYPk63JEEtxfaeA4EO48hUULR9iXsjw8mP0+81XPmLQsSejVOD

IYzE4/4/s3AmUlZDVaCm20yGSJhmqID7DnX2pvU6rG
vgCa2ogGBtnbOChmoskjpTPL7RM5Xmrcis+HVwHqyy8CD2ruklBJuXCn3ax4Yds3mmf+iir1f+CN4foM

N1xA9uRV9Lt55k42+LHwiE6QfWDmS0QfmVXf7waLbk/UKNvm76OXSvSa23a46xnMeAT3Gzv68rMHr2ZJ

REP/2/uh37e1VlyuyW8143fSYIuRQ+X2KgPhKazAen
5L1+ZbqZHZIMl59EhCk6MbZxdb0XPxeAlnx1mBnowFnzGsdnJIKXGj2SIkVJsT6GmrRkR3V6A+J25Vsf

CwOakbf8ouzXkTNtYueldS6TYcbpAA2Ky7/izaaXwE2fNSz6WRAeMsl0AqvF5sGhZv81vK8upf0wo6mK

MNa9bBEhVfp+C5z0eTYbsgEApWdtwcxBiWszqi4DD5
yoDr5Hesjviyh/0rWci4wowubeQdITrapH68fQVmqCcVQg5OaZVafwoeGbv2x4oL6bq5auY1WYU6Uj8Q

y/1Og7KMkhRX3Xwn7u+L9Q6TJ/1o85/rTial3ubgHqhcpMfBD+WwaNoCoQq3fGZq0V/sYVIu9heFDEwA

21BUAWJCgW0MMAMywGR5PpKbrp+0Zz8KLqiHDBbFSV
Zn7GWywQU/SkzIOBMH1WgdJDenMrgmimyUaEU2ca1Fc0Eworg1e9zJwRqbN7/D5UBM4qoKkaJfJ1o29A

ndSLiQ8mf7oqu7appRQrIwi9RkbNg74bzTsFfeyChWbaF5OWZp+GFxKukIaoialBWHI1YMpP+pY5Tt9t

HOwgqauWNpBjAU5+aGzOCS2+TijYreZBRSAlr/pvGe
rM2HI7l17Kj/N2Y9ENDV7K8tWhlTfIdFnxlJKMjfOASetVyXa5MG9DjTSE2zpX8Rr1GT6ASXs2KGXAxt

gALb/y3EqCrcRmFR2ZHQmNQABULQ7lsACmbaS47Ko+tWeyU75wVF9Moybj7eTftIC0r002vjKkecHr6c

ScI/7NtZIckAGeqty1DBWGxhczJfUqiaQUk47Lul84
RP8dA7VDnNUD81Zl4+//2A7vJD7YyUvD8oYdr+2EMEn+nlUNqbHNbTkBpFiXko7zr0ilgOlwf9+ttItt

uhUXU9bRmfFsmLNU26XnqCxHxLo1l6GhgnPfcnLVVAbJQeom2vvMJIh9Aii4+1lQqV669ndPsE48USbf

iPtWDY6CNFCcLbNmtCqfCE2eayAGR1LvCx7NLb6Uj9
5VSSo8BOBsiOCxdwcWeq8yiEM38Gs0DionV3zmMzqBgXREOIFwU/+lpj66g8JKv/Wxv/YG6pttUnK2HE

P8fcTIOCQNYjgTPMd/RNC+oKiz1sp1+67jt8nRZrhaD8fOhLPHCzrxpo4AAic8FdBH5l6lUTifY/SORd

vUY4qWwmfBlDK2lku+SZDKQM7urwjE+5EwAVsYxqWr
WoNZTDEcZWAdayfPN7INY5wBikeR5HRM9mxUw9dW6ZNLwsKvwt6GyfJto0jqwgEb7NkUD6T8o86ufAUT

m6D+GpZNFd9tv6kb9ASSPp2Il5REEYmsW2KVSqBCVwtwV163dRghGC6jJ+8eouM+7PJnza1CRiEJAiyx

p31bNruelxl4heYQYsfGASiflXbbdvmHWnoViSM2mQ
aSJ97QtA8jA3D6aKP84dtSHyXbCClzsWH4heKv+F6J+T9xzrKxRsXMvkyPFA7sC6uyQ+LlXyN2dDcePQ

UH3o4jTvMPq7F+oMpVwGmSlte1FgsplCLfsADhM3G2KUzVF+KDQ9QF8KaKJVU6vFGJVbQL7S5FGXH+e/

3vXWxhqEGy+nm3XPO1LGQ0Qp1F2naehL/o3UsuCyMH
n1crCqi/jlYxfjCNuAPgX/OBQDdwQ8iC+YpD3Vc/mnQpZ2oRCyDpycEo5G6kf9KCndWIN+J2SKMv6PJr

egMZQGEI9NzdW6Ur4KFH3xa+gYY2C6oSR4F7obdkVljFfW4IFlmtzvFZCF7MCMpWSVJMaLrinowT/NDw

21ouE4WHNytcDsS+8zuxXdxeWgYJMnMVYPFlbfmWyK
8698w0REFv36fTplZUyWieTYmSl0JW4+4AU8+1d8UrV4uD4Buys8NscIrLZvxBbN9s17MAA6GGO3z5/b

4YJb7VfFdCTP08Yqsbux9eJsooQ+gRFQ0kKDvweDiF+9aLWsyXAXp7VlV2WdPx9J/no02mwXNxSNr2Eg

Dv/bIkFuBu0yhuCCYeQlMFfjQX4hsi+t6sqFAu6xmQ
FU1Bif0nsbVgnBKEGralUD80S/eCjlymDvmlCXHFC7QU8ZusXmh45r0S/dBtp/40OQE3NuPqzV/Wrslt

4gwtmg9f31gPOScyFiEhyACH7JjWK/peNbyjpUGqV9QPGrzu6mteI9olbmctAlZhtVEScc81jqw/70hq

7/46t4psfx0qqDQ+0grEDlVEm17/sthx9PmtJtnE9N
5WA+8PAh5bwFx/6GMeVvN/nvrznqOvC60FPmklknCE4b0qFImWbrQGwpCLlzsalivrbM53Km/C8xctd2

MGzKmPTO4F2r/cDzFBjTPJwVwmLERdonDwcXFH+CQrRgEY2g0YONonn5U4Ht7OeBO6S+904Ad9ayNz7v

GW8Dm9ghjva1dAPa0qaW3iPFxbrLwEqgeq9eEg4RRo
4NocmwTpRFaZ+lb3m3Ws6X6yS+d+zKI52xl5m+6DTWAE2lO4tLaE+bCXQWQeAkAXERPfii0zbtheDcPj

7KE/uCIbJpOVVjV76+Voz80LkIVc3Tgx4DbaxqpIiTrSB0yeot5sajH4D1zOEqgNXxbido1BucTSiF0V

F831rXEPb2KS/LFfC6y3BvI+ZGP9+DIcJ9dlDD4iUJ
N0OWHjXdXnazynmNVmk+lspT4K61uPjccAFKxGVZV3ykNE9NjuSiAGw41F14sx/zszENY+wRs+w6Gnjk

SZO5y4Z1iCS4I7R857w/Matt+tDg+wXfqwJ8WCp7tMbsTra+yQ9YMzA+XacX2chjKM5Ps0ysBFyC31zae

nqaTfTT02saDfqYTo4sVF8U4qBj8Gky8NfR0lTaN6X
hu4taWLhei88T0mk1fwjaDdaUu/ImBa7tLeezgHThqLLwX3hpV7STz9c7jnz6rXDcl4vGxStSC5edbv2

/QVduPWauI+kkpSQNbhfWKPKDMxhykdRbzsqmL0JzRZS6F38gtgfI6FyQ1DmGflRratksI+s38XZl33u

0NhNBa9PAVBdZ3vlnIwj6RFuMUG4IimyxTgsJlcNxu
/G3AHES+9BLO52KhEnunUnvsOEyQcu5ksD249nVTadoUsRtBRZOQw9lX2oUyY8tzsybL6fgkC+WZ9eJr

6ZZ4w4EBEQ+K8Y/0ut0p1X/YLym//QD+bzTo8ZC2pv2M33MXycC/6zVnFs9FO/+eUfqRHErqF9++fnf1

AjH++/Qb0v9j7e/xv2v2H/nwz7v/vwz8CZKqmUX9oD
vyaAYYBZaiezliwpHQKxbJibeKyrGDNkEhN9GTVGz2BI3Fpyf2j73iLQ8p68z0zGy9VY8P0zq2DGP7ky

F35lrUjRigVZh3DloIJSRauxTUwV6QAse4rCTj+gyefwpEkruFuwfui3CTpKayHSnRFp7Ho75JsgxifE

x9wT71FjH1sJc24SamNxfNQYhTmkXqb2NltiTYMJnc
8AhnLWP6gP8asYJGy3V0zBjOAHdKV3kBi7WM8bAoMHXPMO8jTFj0Ns2kubCYiX5shyA1Pu8cV44U312D

7LoyYJSQAcBrp3YbNRn10k7HoOTpASefakuibx+8fcxkadUMOBx/Dngtf43QP7jNy8jY1BixJI4k8vg+

sy08nnpUCAxaR7KCOeisYKQeSti0sccvFt8oGCHoyd
Ki/MNbCbcTJsLwsYLKkn7siQGh1kUTMq7sv2n1KT4djANw7qxuI+dxN/4IC/sr3xrHUeEh866ZvKhJMB

suBJ9ZlSG7LEG2FZ7UrJrMWBiVLDxvv6FKMS6zBopMJYTYQovq9pwdg1Qga7/DIQTwZ2MS+5A1G6v+8A

hjsgf6jMGbfXm+ZchAwkCUPT4EceOqWR/YCYuWlk5m
kYEuRoJElil2yaBAUzkNIVX320bzbAjLXo0CwanxqRpM/9RDvVjRE24b9pdi+8q9i7QyBvR2oBSScsjk

n73ZwUd6H7vW+2zwIXjCP2b5TRHNOub0kqmCSyzJfvTKz4DbCMBX4k6jCdNabUfFpD2ovQZAgdnhtAqA

aGbjVuOxTf5sJa9cP+tCT64M3/QCeR1FUr5bwCkZG/
gWz/EdCsZB0IAorQ/KQHNEotCrwEQJbxbvV1oJGV6PlqEtt1/wo9obznkLIV1+EsQIRBQxQcIy65NjbE

u5o04wPNpzJMwEv37bNuirkpmkMNpOI06Cyf7sQYX8fwvO9CG4ijuHtWQg/5tJLjY6sWv+T1FuTZZJNn

R6hHXb8qrlJN2+sYem/vGei72KWlQcN8yi3t4NDA6I
WLhqHfNGHJmJWJzQhya8LD6z3Y885fvj8hd3cIjoT+IyYiw3P2AUossjRV5wx+OJYSoXr0E97N0BUJp8

xFbww/iGcEJJ0cBaPca3nHaaDUmD/FJn2fCpjqs2s5tLxwroaesbqsCZcDKUDI51ftIJkqRoSjJniv6q

EWAowVOO9o7GUDywkakGHwQVwrfCh9ny3DoUgHinLG
5poCz9KVPYRylJ7otrTrIfXt3dPWBO+iNT/8qHzyEUbO7SYB5lq0kyk77HF0ysJkaOVWn1amv03OGUUM

ViKoeA6kYknrX03Gy2EeN8FxJtxUTa8m1NjhyIzWQWMqCdhUhDJJnBksgLvQT0udLJ+ewifm8iFL/Sg9

plB+6r1G0umqNOPRsK7fQxH2hsIpxljAUqRZ8jHXI+
gGma9F3MSwFtUM+oCnkYB1g48L8fza9NgovxPoWFjGeGEGBU3xslhSRn0/Pcqpttr4x8UAoSU5kxmlir

1I1t5UuygZlb9HKVy17Q9WIIKzKUZs/Ewx0wSF7nqIWbWZnXExVlGUYT3XHmnvAuDR9XnAB8XK4uSWMl

XMNvGlHDsAfyMb83Pfo5VvE1arucBUFKRJRsayYspj
mFTvUfV8ESForw9bUWKYPGkoPDg19SlEw6CDgcezjQr4dnjwr9MnUh/H7gCZFerF2OdpXPIkLcPBhP5g

2CXzbPxB2buyCvMcPxPsDs/HCPhHqilVx8eg/gyH6jH0677vsvnJTDDMvYQWpf75MO1B1Gxdgxlzw0JG

b6e00Xyff5RY1NMNYu8aqN2okiwYPSdIVzcbjd4xXH
icGE8f8AxQZgqyeXDb2vjmhv7JtWxG4WbTxPyJfik7KSv57dXVSRv66qgFI0LQ6O5TIMEdFLGMFLLZL1

t9QCe8cb9eDTU50iZMJWFhwvRE1EMwJR8azS4f4lbJ9yYsEwK8Y2adzV0E2nLX6i4ENkpGpgS2P+OY0E

Dn+606dd7LiYGgSr5z6Zvsn0ghKNvG9PEZ5slRXU1O
QPmxu4Fsrpg9MnYw20N7Nn4FOodmQQxGipsb0yAihS7yWiXvRGQiNrYZqq97HONlWVGPCYqho+kwVmAD

UfuRcdiC1fMZ9PiCKX1y22UwyBT5D5NKJJFtkxOIrx2h3wVTy+ws3hjxn8z2UZ4Y/y1Oyxy/yjfmNrtF

jPPsUtmDvEhuKlfh6bbnzoe9nZIT70FfTSCmayAI/E
Qvx6eUhC1GIB3lAHNkPjHoHYRff1j6Slhyw/cBVM9RQKSYn4dQZTkG1nfb0bf61ubjCblnFAPWFEVvba

zkpoFYFqxsjCyLrRV03ll/2CQJJgdPRipu8gmpxDwonwq1+OiztGSG0BsvxukSOLSYWE7VfFwWQcRuq1

saKcpFhWL/hKc+ySEN/kw8IjLi33z87fl9V9KOpIEy
eiY3Yy7pHBhBhsW1krKqzW35s7dq1FH7b4ZTXtcVwgWhbPqkIkDPJ0huxpWGuRDDozP4SknKUUKz0lPB

enXtsl56/BCcx3tTZYi6u6Zb3jaQVn8gISUmkKM6LoctLr859YNtAqaT95XftYu8BX/NfOGVq9kMS8wq

m53D/i6qQFmu2i2o4ocbWk6j9UDF2CcD48sTfMP7mj
fXVHL0jHTKh/ukklkul7GdCzsnLouE2laLDVc7SNnQ3wTY7+zWmpoqWHobStTbfWYWviNRF5KreRThrA

lL1iXfSCltS0iiQuR9zD7t5e4Cdg5M0txf3SOh8YL+pVkAiDR82Htp44zSPQC0Pvk4It94WmrU0tqssx

cwt5hED9V6m3DDkjeibmnvWaTKnenh9DNA79aieEVh
74i+aA+oS4wr5U/aZzlQoeXBp0hLMBgeZYhEdpiyHFnb54XaKZuGyx3cfqhI56DtryiEtAKfjO4DE+hQ

ohq9UxiYN36E679/eC/hSCSTxUjQv5OCSrgHpNlS7Tlyk1731nVdmCW+dn5RMQ9lAFtScUR/PgUUtMSZ

ApqrYbAHfxCKKX87+77/0zw9QvIPk9tljBZmL125dj
fOrP2bsDvzlgNISJ9sZVJM4CYJfSKux1nY4FSNe31Su4Ie9NaMxFW9jziqdUOMwIp5Q54EmY8Dx+n7Zt

Y5YGbg9TxyXVOV4p2HOSkYnE99ir/y3lJXoroULwuslMgVunDV8PPax0HqMgNMCPrfDruk5+lkxSvq8D

DeSg8n8OPbW0RxW3BhavEOBq8jpieJ8yYg/HT93gbO
mZiU+bnBi7Y8lfS2ocJzGHXC77ryDr88z3QxAzbS6qV3gsBrMgFrQMhOKGu/jmubfvu+IK5z6UE1kaai

Sn4RQU6mflRBWKJwYx+OT3L/gusmrmHPcZwR5BZY4fnP8KmAPF79ZRuzVVXEL7qXd5RWImceCBnPTdzz

mAwzi2eVKdKceJF2bWp12IxbzvudgmE7THbvX3vSAS
z6hoyqOU3+sbjUfTrbcR8UCMIlvYuY1atqMNKhDvkJBEjSb4WB72qOB3v4f8WPFj0S6Iz1JkJjW3EKb7

nm+LhCFsRcKUpReIPdKXGGUZMbexKtvixxivn4Ymr+epZpFOEKcu+jIh8/eH1stGar7Dq6sVuw6rswAt

Vgi4yAL2D9ywV7QrYmT6VqVbxwxM3G+bB9+7i59S5W
t8W8J2OuyAElVCXdssv0SyEdtSxMq3NC8bgO++FMsMVhBPz1nFYQhAEsJGfKDCuS3RWYwIgWpXIZHoWV

Tce2Zw/FLPevZ0totZDUuJwSW97JnNDrGwkFM2TRauigCQLBqp/lmNkGEK+mNMDWa8q/58sUyg7ng1uh

kklkavEoVOM75V4WCJzdbMs75GAFiDRdBFPXzguqad
7pXaobY6XtZdxskOGS331ukilsR1jeuPq5Sp++rUcJJ7O4i/MP09Bew/t0Vqb1mwWC0OGUMNm5cVi108

bfW2cLZ1tX+rEqt+b8A94vt+Acf/KdZ0g+lcr2FveWU55/W3K7RQWBrruMRD38SnaRGKc/ugFcUJBGDh

S8viMTAy3edO4muU/fClEsthl+wXWD8gncZpgD8Ien
rCW/1xD3A+gPuV9SMimIOcLTIAVCv8NKZkC9K1akCQkk5KEnOhL8nsRa4k1jpmBz6+zNippZYnaGCHDk

/LPSp9dgyXMO8uS9eNyOv0H8X29ObuqHfrktfuSxaNkkwFY7BxKx9D/Gzp0YuoJq2zyMr3LgtyfBP2Fh

gDOivKvhj9IF+9oh1MmF29kPX2gy79x39z6n3uIskT
45Hq7TcMNilODAoAaCKjM1iz4YHSwkHr6CW+dr+q5poUku4Fk4DsOykDtsC01tZhMmWJl8Sw81c/KdGC

Gj4ftCNUXhRweo/BE5OTqlmQm3XgV0re2tCIhtfGLQ6NyEXme+05gO44IDLoh6UJS58S4mdG0egaceKt

9QU5I4sbJR2a0ipfgV/jACzVnVJuupDLGCdmZBPHuM
mNwF6JKU9skB6vZzKl7BNvrCh2LfFMRH6jhXYL1MUd+/bAnQW9HZTeiWFiYuu+2Ad1CbzRvB66I9RrGb

jczW2qm2mWbty7kf0bq7pfDXMLgM6wmgCB03Kfhd6NQHmOynQlZMa1JXgH8p+aHNj+l9yqQ+PFWJGecg

evgdfChrPCxmB11ucl/vR+0mPYyTfKeiV8rA+x6Jtk
gIL2nqETqOOWlHYiVM+nHAdBa+Jx8j1R75YNayluESd9RcGQ3fdjJQVh0+W4Jxb2D2l9/U2j9AqZ1cRM

kSwVKg7XiRHjZKomCVAQlOIkmlKwWQBsJZM4IIxkyBuDSXIsMM14n6FLkhyZzC1w9mho2XiEWOJU9kwc

p7AO3Axj3QiUk++NHOnESGTicaIkLZ14Z5o1MWG8FF
dw28OgUNBxThQnII6X4KC2+5jzDI7YvTz+g8MsAHur+apynQACyPC2bNr04vQJLYINbK3Xl3WXE8xyQ7

eJJNukU5gnp9MFgNBoYu8S8vigFuAy3MSfKEU8iRgUfIwblaqw+32l+MZaqHKZDyKSSeTvecoxmiOuwJ

lTGvT1rTkjdzwRrOoKgUwk+fHGki3nkTXJz+V7Ws8a
tHgnZ0Yn4L8xCOYl98pKv/ql63I1UKhEscZ6iOsnCDSXEWal4nACSbXnJsJRCTBOjwNuYuUEQWHGScFs

VJEMReJK6UbJEWXGU9Rj/v+d8q88i078tnq38MiRkh+/9iavv6TjPg2rSbq8+rn+2bxS0T82QPFBgZZd

Ql4jAbjZ4QHRdE6jHEz/KlA802+5aOm/QciBgj2Y2y
96fBnP0WYei1H855uy+6uwIsDAQyDGWka8/XVfb3Hpn6/WEoZQoZfhPdL834qj/KsZBK403v26TvM2uA

Pvxqcu9n+kjblWEETn2N6+qqgk6fHp2LnZqIW5GUJhnRc1ko6CwTCiG7PDLWOmbkcRAVMkQXChK5Vhzj

OhiudQz8m8lCxFU7wbAyFbLb/0LYTPOWAsWdEdo1o8
I0Y6BrwKRn5gCB9a82OncBt2IQHiWPy1YpH/LcHV5NPNGYeB1fjJ7+IsXc0asQwXq59LFdV5/NHxf5ss

J2P4SCcjt6+yPZ2A2z6XHpvwiOVklUVZOrhslacsCY7r5o1ClU8A8Hj5GFhuq6xm+IFjEcec2fevHPDs

lefOs9PiUS8BWpqZHAuS3pXDhTGhzu7ka+MIPuQz52
AaVr5OSng+ZcobT0UKESYKTYI/k7GwsOp7j15RcSmFomloaLJG68r8SwhmKDLYicqoj4S94KH2YeyVE4

p8EOGCJKQtotKWL8Xha4kzsHiCPfP4mcSo/AV8xBMrawbmkjnco+O+anKR+QdqkXipBqmAUP46h4m5EP

i/+ytXukd9NqRsOU5cxW0OyN52R9zCEOqMusoUJzNT
35UQVVUIDo7dub9n8OA/YAnsglPadN3f6NGJpDX/oQ3FqrE8i926GVU7q7qIv6OM7alNa4DCOWmtCLlH

MUbhONy3wPH0HinABN3uymGu7uJgdpiYplk0U5J6WmIPbs4cuT+3I/l1UvQFBd2JSZvIc9IFCf7/fk57

g/yZFyXoV9Q/cTtlGA8lBSXG0bWf/3kJz3w/Mufy3m
E7+z8awmym8yGMu6Saws0QRot4kfatRBotJZl9HoM4+q9T+G4gfkzVLjO1LFeMhNYPK8SKhZlSWlWf8l

TmBcb5EfS1Ku550fMPy+HMYxxkhhoTyz7/2nMeC81N6xL9gEy9Cuf2AbLEvTl4joCe7ECOzYaFrafu5n

uSAkwLkZ20z+/PIRZEasO2SJYAEi51BKG3SSrYACD9
rUftP01FvxA3LHHfHRsPrHBgqOngrM+hbQEJ9rxSV9di4IC4J4V8BkH5bzE/xbB3tAykwnjFKPR1eFpf

bW7DZdFtMbIxCwbzcvDjolbab8r3qgkd1e5yJ64/hvxiR3kbs0osKo1CSmAJu5JUL71Hrj0ADnpCeOa7

reIb/n2xeTvH0xhWtcRo4ahosOvenOkXfSx/5TspxW
xnqur2+IpC7GLr2xNj1ljof/GjkmDUCkudFFM6CsyyEJXSIN6OJjotU2FS4J8Fm89q38MCwQVGLtuR59

JkCB7qVZocjM1DWfStjI53retzQrv8IHzv3eSMO8f6Rq03S6gpI9p9in0jOE5HEv0qn8S+clWd4tftsl

1VJCRLZA2urilSicYQSFRWtSkIBgs7E/5P3p0wFAfO
DXKg/8fecaMFpv+xsQTXaDu/1CIYprMmhQMQMVisode1BpBX+2SHMVdT9QzcXvo5ZTVeCnR24wyzdFsf

KNlqPVB4AObEsjElmO4DAOajVWoPL57m0+BeWL4n0UgVggGvzVmVs2P+63P5Vi8/XYvEnHDotyUBm4E6

iBT8t2hBDcYskQfnUtDy5dlPyMvINQQjq12BGbs22q
JhOHnqy3KM55oF+cwBhFot8XiRFX7PlP8oy+0laJ17vPSXFbtLBlwbi3IQLmHvp2/0LfXj/D1nAhtwOP

WCt7G7qtVA/gtVZUblDJ8lIwnnZ/cSv67tsp3U5P2Aod63zauasFNDY9tlDPRHiWbtkiSPftHtzb7tm8

tFt+5jjgavU1NFzKD+t0HRfg1vASZufb/o7fgsuPwT
9E77zOWAgm/smh0vCo8KcEAmO+54RnLbHlOyiXideZYUHQo4tq1pgal+ZRTF5nV+pbWd/nEM/CM5WS89

aqk6lqvy2azVoILc7nk2ofVpoxcdGy4Yza8K4A8RIHrr3p6Ua8VGiuphS5cPgUrFp1GGpvfyMaCmREIO

Ck/laTOnoFuXKrdRsVPVQVzIdzlUMa4d+gSndhq1Ja
YA8oJtY/BsXNKGYJaK5mN+q8vjqh2GTY6CCeu12ORzimTSPX3coDR1cGTidUYKltkefZFPCHTC1AGYAp

1uzJTj3UZUwruImMdiW8QuYXOWhwhl+T7wvQDvlNszEAiSA4qRpWy3C6K+qLQpKb2gZO7rBI6nb50jpS

42AhWX5TR8uBxp3v9NqSmpGDl8HNtPNpHiZnPgwvmf
oKrNyAM+oZZw34cMtpfPCDSbn1qH6F1cCKZTMIxrjPBKsHSRVf5vt5ZsNsdYy0Lgb4wZazmeOEF5Y8/A

z2rbNOv5sLcGUdC7TvsVehUawWjk0Ht1oA+x76I1pRgRF+QQfrojbp2q/EYTA5MHJ9urnMVM7GgSIV5j

vwhEVYn1RHWVTHm7pJcGqITXu+L9kDYLXP/9WJTqWR
vSrOMvno1xViXby98Wd3u7HkE2k2/ADN37CL/HFN3q/07WQiotrXzfV4vd6KpBuWPlmmlrEI4sPswmoX

gg5wgE8MkAv/iP2mibztasL6KX1TaXbNicM0CHHx1CiFOCWwaWIJT6nB8QyA/VMvwbEB76B0FXfHcv52

Jalzhqphu6qKZNA2l7qreQ7Ne0Ib8v45g0p8m5RPP7
cNlorAMYg8dLc+0iB/0fnVjXJy/kXuuvUD92GrkATiKCT6LRl+e4w8XRLAPRJoaJWseHSWMbdompG2vW

lb+UrHK2N40hJcgaX7+AzpcxQA7JjJkNiM64RwPNzNLFDZ9G2BqiM5n0LBy3JIBk/PtgyftdwrqbE7aZ

fRSwBPmma4C+BMC9pTud9hwjkCM99nzf+Y3nV4djxj
Naf8G9XABl+8B6S9LFqsfy9FljeriN30pItXhp/DBI0UiEGko1LUgDesrqRHADN6cAXG5t1XNCdkB6I9

7QBFBTR4nSRau+7wNsf454BgdLM/zgVMbAH06dMunqLZ7mns22Avzy0eKv6mh5gHI4+1fwR5FolNj/lg

i5Mfj6L1/zyzqYRkWKehI0ENmpxouMpv2vrfoOCCOG
renvD9TbL22c6imWsPfHddatej77fwSIg8JqJn5ndHD/jpx4CHeZmYLqSgaBe5VhKC4qUe18obkgoUKE

1ad8bATPTLOn/H68ksk3YpGZSjC0tCfSnwpJXkZp25ThDrnglaYQTSGk3FCZx3SPIB+YcU1STyh7mrX2

CLg4YJCg6s3xoGL8nUSivltmYXfTjiUXAmL47moSq+
c45zuqxXCj6E67ybKfMxETNRrr7z57TZ/DYXsG0S4Xojqf2OWYhoKeJfDx2027QUgkm8OUjHsEB4fl2p

L3khckAjlG+8YR89ZLtvEfDsC2+LLltDLjNp0BFxFYLbJv2UbgA3Rn2cJSocgZpB0fj7gTR6AP6Gh6Iv

70m03wgC6GjA7gG2Nyl4TU66S5gXGm7p7eJRncgKh8
7o3RUjg1SV6fKpprXGzxUm6MNv9S0A4Yk1oMcpo/zo0u7OhD1onNm0BBn6kZU3GCSmxhRHfqjuMk/Wp6

oyFDaa6M/cW0pVQqYXPgJZ2mWxVAGWEE3sun+AVwh7kg+svyZp8QuioCteZzivBIoZkFiOo3Gpj4TLR/

yY5gI6dys3Rkt9mo8p8i4JTBtYnVH6TQ3xVFJ8blUv
caVnFe54+7uSBDXslPzCHr7MNWx/XvA7fQIRexog4G0LQSpA4+0Qm+MrQN8wq2SdCaJFNrmia3YBgbxD

p98bNl8f/xxtZaWq3VQPe85sLuaXG8XbrED1EnlLAc8KpBXsghQdnooJJdpjPG8k9Hlb/vCwF5nxHzvH

BZHo1KqI7Q/XgPTFzSvX/QkMIVbpi3CzHsg8v5X0ir
phmT+2ZE1/v2Dz/RhxcQiVx0et1xxFEwLLL0KM/BvgWwhdPrCdZCp3NG57hCePcCZowSJEBpmguTFbKq

IFLNq3ixdGXCpBTzmjjJnCP/0vxzVbtSVgLiF8zUzeQWqJNYm6QQRIenW/KKnzpJEnL7nezaS7ut5VPs

PGljNNW0JGSjwJFTaUeBhaquqZs645x0ZHM7Gx9m1n
JfcJa+YHzHSShqsLXM6as5FztADyUPEDN44Nfqn0JhaFIOsv+TOqHhkeuMbIsi49sgrfCgJcvMsDQngl

6vni00eqpMBP0Aud1CYlqjyY/kN9W1ScnwI8GcfQORufz0goTqdeDZcJP1eHewHReqcCQnjC9bnmTvM0

k6nRIftKOmYoDpJmPdqD5oaHZIGrBUZj7FkBH/IY6D
83DYU2aAho21Yqywba8KlgKiw0VN51EZXjQ00BsDWMB1UwLuzU4u95Oz9PouJHAMurb8jZys/HCH//rx

UeDrlMy6qjWzUc2maXLqtU7BTisz+fNvNg36cDjMeVTqku6MIs/Ylw5nWOefP+MWo3vrU1cDXVK1Rt14

keDcGg4a6TVS2cR0OTwmCHwuzB/FF+p7TdwqyN0CCU
BMRP4QheOdapjibI7UQQ0evDFSo1IVjdpAYDRDHw1dix8D2LaSe0RRr040DPRaxZG2DiWfkdr2liBJ6w

Q8hfP2AI2S5fbsyORcf0dgpq4mobyat7p1mshZ9jEj5GXR335CQVjeS14tdbfIXXia3JzkUAlkfLfStr

kbhkJV43DnoTopF8iszmOWT+1rHlA4xh8Bj+5nTthB
hW/zENrMzKQ/yo1OjVfuvA4J+UHDN3AZ0gX7cqJQ4HfNNEzgkdT/ywQJm0+riEUzrdF1oTI4ur8EAlz4

mqPdHyy5HMvPIliPZ3UefamumW55Uw0zRL4MGG0zHrIiB3Vd6+7POfZZ1d6ocvWhD9Nujs38Rw6ixpPk

hlJyvYLah0NhASaVakDJh8AEer7xBTimcN9bJxRwOo
G/xtgwNk3zdOz1YFfNXO9Umxj5C1qggbsKUAM0Eh8U06ZnRw+zDeFc1Imhs3Sm9Lfo6reLHaUyuAEwgg

LKv1K4paW971C8U64A0LtfVAq4LKm9u5Zt/X6p6qvlE12ifu7VwP2DqWKflNhLHZ60fZ0rF3VGXd/aod

dGMU9Ye/mLFxpwS4NDrUoqPdu4uGXNhNfEVFqEWXxP
ZI5DXioy4Z1aT2b/zDhaFTz3X49jlW+jAXJ/xfYt98Vo4mCPBFcVVw2aLBbc7UbEmDtqIiK9NDPx+vqr

Z+2dJ82c2wcdm3QiYethKFKDKjerMtMu+UXHvDRXj358GcWIsiEycETKWL4RDYQFYg+PSzsozkhfLW1C

ovumMKLY9A9CSN+SICbVxhZI+FkCBIO6mak+iGL+tt
pbcovwO9zzfvB0RKi2h0M3/lgNv1CLBS5PjNy9COq+tu4x95lVbQTCZ/WlpOteuO7e31DrFzVemXNX6v

9d47D3/TbrtxcOjPrtkk24WPprmvUZ2kndtgIb6sOLVoTzVJElPt9+zGGgBMPAnnFNCTJSrNspq7y5Ak

ZA0TBX9W4adDKKFZeRmjOK3Y9l+6ZO7/7uPYK92Peg
N4mXwDtLrYyTqMbDiC0K8sHsrGkju9houjDfIsVzCAaXipC0g4Ak9pPVK2fGx0tStlb/MIOCUWpXsNiA

N/XaY6Vyz1qzSOuxPNlZjr0rQhgZraTvk3a423UzTPJ/Y2cCWnuT1Jr7IIgtV7x3+uHJL0CQftU3Nw/M

Gco/c05juaReLWHzTCuGk/7i9B7vV2mOaWpj1aisw/
2DghWqmp/9IJFiOv7kaJEO6EwJLMK7MEBnk66JSVs/bzt0W9ZC8BdSUKtcZlzh0GlNkjLIwTD36JnU28

pbLdh1zN8B2ei/DOS7qEmrWug8hS6R0uPGsN1u9zL+vVQM6HpjQdWA/Lg+/o4Fi/nahHL8wCXyCMAq+c

94TWCPuAigW/K3aEoJ8hq+yXCRzunIMOoQ8ZtS3rhU
6uJgVM8uE918v2fZD6jurJ3E3PysRhBd4KhNE/vOe1Yz1nH6D++1Fr1zthWGvr4/9SYaREWUX4jK4jOo

jz+K4NfdgjT1DR5uZDjO3nCCe399OwovQAI0Pe7NDw46eNmi+DpRNicaYnKnF0idrKKwsSLAyb3N/Teb

8srpd2U12lhcGW/MVpC4RLc1QdkORqxIWgTM9RHHYD
ozJpoD8aC1yQ/hWeqRcHJ41wz8RzGsqCPk5O81qO3/NpL7QUmbdAPBT4NIWmN+C5Fye30zW+c6593aYu

4RJHzR2/kRnsx2lUbgfTnB853RLMZTDCcpFoLW4AgTvPYQoYhPxXLmjCO9oKJrjvRIQR6LO4UibjtfXI

FVX+s8xAbA3kwoSSrOcZLBw+IFVoWtvv2ENSR2WJzT
rLDiOwMJ27LunupHgPXS5BI1zld4152Hu1xTzJXFAp8vjSVMr7Wn3E7PbCVBXl+fTWdBMLe7rkDDK8jg

+IQrzI+XemEJylsdj2Y/mHGh9c2S3f6GMhqR27r9KODjIWKTKQGF02i7zruL5F5xZmbmj8ztyADLHhvJ

X7CEheI4WO0icZwIvnGw6hjfoYHIvIjzMqZeshE93q
s3k2Z/qeSTO9yUHTw+hmOMm3o9uHkw1yjBsqfeRRjB7OdzSikKKohtfRN8uDRHsetExFA+86LDW4dtMY

3yfVJkx3aoFg9suU6xl4rdKcv/ih8RLSK6ZEsCSmtd0Kloa+2dJeZSf2QDfRr8X2xLjZUTXgs7qLnNMi

F8F9iw7+0FQ41ImI4RFohYY9L+7fG1B3PCAMngIH8x
JVoO6VzWE8W0oDXtAnehXhcixyx2iBKLMX/PhhaXPEm565+w882SYK4lCSH0RFZlUoeZVMsc6mBRAS05

OgjDa1aJ8WWR79I1DqAtnnE4gff3awvi0aoR8CHsEga5ylKB/pPZLc3s6sr0iyhqmOOIdnc0acPLQ8b5

8xM80oyZoM3JcVO7wtG2sNRJtNy7FXsxD/fuFb8JRh
sBW9hvLtFSStwIAT2k3mchIpLBcl9jDzJpQwEoOgUx2+LJt7BouDoAyRN0NjRUDWJ4/35a7UJFoZSTCk

fi5CwX2YFUhKcY90DrHZot4j4VZVdW4cSmV5MBEuXazMvIvbFkb7Wx98rRt+y77kY1OnGQzCTdQYzt/0

tWgOcww4mRD0/E4Hxfgx3l29IQodGeR6flwS6MCIP0
3Obm1SffAYFTfhv1CS1wFLsj7FmUfGE8Z6n3flQ27R3Jv/C6g7P6BvgcwahdP1iY0WgfVj8Jstrcf/a+

XU4GZzNLDirbu600NCu+INYcLl5YpX4GPV9i0/iG/n8zrvilTioh1ACrvpj7CQ0fIFZPpayVsji9JQBx

dIv2cSNKuV5oPlaKx5+08s++y1iR9lmYao5UTSl5hc
0swVEKJ1pWGOGYvkB37zOhvd5N0Jo0LuP9AewAvYjGy7hWGSdRz9n9nWr7T1KMx0GxrsLeGzIHXgAiKn

/yJ+q2cJxhFFJBq1tbY+Zysw6I7kue3JWCmOlBeCPrJIgzpw6C6/A3X4Qghg3Nbpzz5NB5XtO5ye5HMg

hySt5MGgTogaWL8+dlAWXIiPn1+d/ZWoUSVwtI/TRM
QcZ5vr2up2YsUAsR+P6h6qjUbonge6ANBR+OzGZHQsYZMtgFbijbVUM6ZV19Jx1vl4dfzl3eOOR4sVJk

X67S8Xlf4McAUaHlrHXIuRvA3eY3eCP3+vdRSEAB7lgKAUIruRFRMuziIfzTBD4ScQjfbtUC4nux3yyk

TtTPlIPLUe71yeGmaN1rI28oQlYsd2bklKnSwTO1R6
57Rl718LCLcxvmBEiezH3C+cx7GEFL9o9a+3FHfdm2qj/EQK6tprp9y9N/+KrJqPH2ykkkB22x34PdNx

y0OBrA+gdDrl1810rNm7Rqi+WHwn7F5IdgebBpFyulYlKQ9FmeIgCr5/c7Y8nFnHY0nSm9WUp++XtULT

1tGRJ4efOduNSsny37zUhpGxcSiml/6IR0zloldSte
Iz4rmXhrCVAgpCP2aEU6lbk/X04hDbbRBbursU43ipnCoP3J0MHvyg+1tpUK8od3inZD+xGfPTXQmzMI

J50zXUDUmNJLiWXaZJKUN3kGcypV0LltvIEkYf3f2Q2x7EseIvzVZEfySJZhLSKYYvITj1DRA8O3PEfV

c1wjhx2AQwVh57h0iJ7hi8g+xPtswPZ7CPDzxKehGf
sM0Vl8csdd3vIabDqRV1faz6QnmsZf6vcO0Mxc1F2sWWp+W9eZimCtnRJS8AsEIaLPowNtd/R1Lx+wyD

XzpLwU/iXxrYttPfT1ufdKmw4jc96OSv4mXwsz7x2Ql8nPw6Hd5+WXJ1S8WHtaclCy+6YN9vb7E8PigN

+ZQQ7bCL9XHksG/1AQS4WqoeZUrOxlQof00AeFB+XC
NHBJs0OvgLr9hVv7TAU2YROQ4vUoOBaZ+dVseBShACBOmYjdxieqplAmWCn54n4hUlCHiRRTwz2ykX2i

AEUcnckO02RC82zx5MzlVOmwwFJ1yETcWZw2t7zC+xYScwcl+lSstKf8d2N8DvRWhvpiQ6dzYAC6PEuR

u1x505QMaaUSzNpJykZP0qlFctFgLQB7n0+FJ452km
AMfYHvzSXk4tbanjPFnQGLHgnqkfSINa53Y6VxZzyaNCw9BHnq1pb6jCJs6wZHWvBSn9BJM9uwG9MBNf

0ld9ScD3qRZ08Fmj3+Jhc2EIkJ/Y3I6unWe7U20JzG1QAHIHnZCggZpDZ2omxxg/GR6H+UVQXPHZANl5

SnQejXj0JwPXDi5PO2wXzJrhzrbjTkFRUWj5dWKVsu
1Gb+yIoDbxL4DGEgCmkUM8e9/mzynTfGzMcqg7vmvNztvZ8xCwU47iLMJfnO08lwugED11mESBdoZbYn

txhRn7SinW1uQhcxMVqIF5in7qufnoKpobx7bjj5WZk0iBrg8QwkeeLUd5c53VNh2LiF7IO11s4JeFJ7

GDZziagh9ydBT7w5LVPozNocjHwc0fh1w2oqB9XyPU
bQGNxroob4/Jv5Mw/3akiw7pcpnrMCDLQvVRr70+1qanXABh019rhHCXEn35Uhh2e8moBPiFqyNyNvrR

MfBTmwte6tQ8enb6JyqM8H7WQflagVyQ/oEevR0vy/hmAjSZcM07JOVsclw0c3RgNVCINz5HOowWeLX2

p3yBdnBKVMe19aHSijzWlvbxLE+VJhi9QBKwuY4pKU
SNDLnbDXfo10xaLNerWtm53c81pmOsfg6EbvaxZaCTyxiJLYqkIox9GbyUrmhHBl5KRzs2xlzEp3YfrP

vT0ls7sZnEnIgmf0zHtdlZmJR5JFUTdbfThqKq9ylIc82tPNoU3lOFHavxCI+u6sNwAILT0RpUi7nygN

8M7DEqgHKQcstZ/KZCR6YTzUnGSb/3VDRWLQz+VUee
g4E4OCZlA8gHjMq+Zd14jFvTl/UXUID7mzs6dvJNwtMjhcDr1uJU+S2pjsWa5MR9T19yQbWTdSil8N7R

Ud2g9fSNd7Nys+0zuvoj0Si8jOVjJ2o6ke6NHSIMj49rXtSUBwbFBjaVr3r8Zfevh8wvVUxeYLs7dUZ8

iA+EK6QOYwhk4eV2tKRSqPz08DhLyySTJN9d8bTBjB
iDOQP26y24+S2IBYpzLm9sBrKBtvk9nopnV/Oz92A7q+4Mdqgk5apjktbg4EI43Dgl2phY7xqvtTj6g0

VC/tB4EDbRO4OoNl2Dl3+q7wayO911FwJRfvzFcRo1vB5jbHN1ASab+BZdWHu8X0mGhCq+n6+HRpl0u+

xKRuQ19oClPHTjsQywSqlpv+A03gE7c7eJf5qHGKv1
VPqkfjXe2pw+rbWq/2h+CW2u31/5DQIY1f1fN0uXGydyCbmlFK4mQfuUd+JTL3Z3T3JK5Rm/pC8xJekp

MCQuP+iNeZQ/QFoOY3ZtmYnMs3l26M51Q4LhXJnICSuFHYK7J9m5ytysiPKK8DzbtYCugov44WOCAwSX

x9tpqbaWFOyArkzn6s+AiI23nYfL2EqatmMvUoK4+b
870j/p2qbaw8OTL9GigmsVxZPqV9U7PiD6wK+jLAJvlet5E4DZVqvU9T+r/cuKkSKjZv+hzwHNWub/Tz

DYUyW5Gsc05G+AfwP+DfjfEfB/8TkWdrGCj8JjQu5tnZMAfSwnVpzXLwwuhi5OpGFBpQ2hGSex1oXhZN

7aMw7Mxzaa56Jf5rv/ScaW4cGcE6bOFccsTGeWUt57
Szx+0EjOezc/ovb1RNsR8kNtb+2OyzFZ+rN5PhWq/WYn7HBSlmW7TYpLW+jNTU46S5tkpdFrPe353vWv

Ir3l4aQBZDOXaBxvQGEl/Gir9SU/i9wljFpDILTfYm1rH8t7Bl/W9/Lf0Ckpat7KTI2e6a2OHcVn7Ivr

94/0QpDAk19nfzf93vqFeGrldy5EBjhZ6DO/aryKXc
pTeMrJnBJDdcUk+tweAudaFtmOTtpqc2SupTU9s4cD/TQDBFrorN5p35AV31fPy700nOVA9pqpB1EEw6

s5p/WNTvqZt3CXkn1Wf99ep0fqnnfNUWp3gqh/Hbuzkvomyw25NPQNKEEm6tasuGgMCvp3OI3dOT7jhf

4gvd0tBJIJomjiaAeQkYBiBBh/8h9DBL36xvuJy3J0
NqFRTwrEZjQtTgJ14HgNO/AUgepmJ65YeO8ipT5tgVC1QDvbI/gLdnvHAFbJM92wGow56p6eFH4/Dayan

sbKy2zmde5Tt6Wh+HXiOD0Mh/MJEVAtPh3IfGzcc861U0is7XX+H2W7xUcu/XGZyZwhWin+MNmSNxopO

Vt9jw4Vs0mPOnz4cNl0GCd0LKr7P4JV8I6XMpg6vIS
71Eag37xGWbpM9OlhiOfnpCmCi5CyZyA6TVlYO7tHKx04ZjwISgBdRH8uAA1Fm4IrQC1NeS40eoMbc5j

2FoKBy1Jayd6v5w6JejLiyRukff2Gj89pXN6+DSGH+Fsr8aKwS5oVag35O+X1Wfh42I2DL9AR1+JBmql

fbU8WOkc7ECgkQAp9rlmOFCgh06TCK35xIa+n7JNG/
rfrzmSgvnJTHB0r4JdvIU5hy92R375ERCQJY3jlaAUww/FJ+RAlR0pBH1Mtj1msGtUuh2z0RiiXHhCvX

bZZNIBoiP0b9/pEYnNHAsMDgBwruu85NLiFAEse+3XjnOFY85MSTr6CbrvAhts2u/pBEPAz3+ywyWJTp

E8SwHXAQQe/htDjveWdwZStaSCbl46Lg4nxwGI/XIU
ZZy9lTZNa/oaUD2z4mcfois0E1UQLb5nb92r2RamPKY/0Sca02j3rozI1TNEuPFm1EOQJTalK0IZ6io+

F+C8Ldu99eglplr921iciZA7IFiTNmEaBpj0BNh7qN4FyNR7pl8AocWifaqu7qvb86CGwnhvPFmoO7xD

TrUUZuemovYvrsIZiSjKBuwaZQ9SQgY56qDGRJ5iU3
K9RsESlkusqu2Fs4mElsRs7aua8K8Wow98Ei8ez3dNyvyH8qxa0XZYrUH2C4lUY4Sa/9eamohpg+b6tQ

YQfNjJHR5GsvjBhhC+yEW5P3632iPu7H0TkuyzDGy4dKRcOPHL6VQoqT/BhUErj4czGSPr8CfzRM1Utk

12h5wM++VAAgXeUaQDELz9+VtHW1QZ20LC42zDXKQk
1vAeEdtjbuigzREk4OaXdjImR1RxGlS1OBKn60OFBIlMpbRebWJeeu9+/1/THn6O04uqzQIXaiYwu94o

visRdQ2CIOISjLXzg8Nle43I5f0wHZ+RnxePnsLODBs2I+gg/YQOY8VBXzm9NriPI/t5JPJSC3dqUJHk

aB0QzRdynp6tc9SGHt/SudJsKcKAs/GdIuZtSZqSAm
1eXQvpS0/FFxmKEm3f9mAme3297bKKuMp0jNy/404O+Prince/ChJpTFv8JDVDxs9fgHfJgLvPMS30ADUN0

+/Lzu/jo5CMMEjfRUN3LQ3wQzfKPMoZUaoKFLZ63WeriK18Y7n3+qyyCKGKQMG1YV4EPyP5pAlyIx5+v

spqF3YqEBPpB0LRvup11Fvru+pitLcIXSxAm5PXoXi
P7Vc/6mkFXYyX6wqiB0YjN9/dNLswOBKejY3M/ZXmTueiTUcYpSRwd5iaRFImEoII0r9pP43yg3d/V1/

egoGoNajnkXpmc0Ry1IFgOD0Z4b2YvUT18tyC1YRVnwi0/Ot6iozcoN/RIpafkDYNPuPx2MGXyq+NqNB

7HqpTXpoMXlC2ewb4aJae/N/bbThzyhF32UKB3ujwn
eROFdl55tQmLg5dzmF8nZt8ZX05HlqbTzL+2/yDi3vbQn0lwEXPrpOrtJIua9m3e5t6QWkG2WwXGB5IK

oIM4gxX3ii4doSz5H9mAfvgq+MYoeB3TyACb4kzdgXoXViXej9rQc535hPHPc609BP0BuKG+KXKXI44p

a8BCu55YNzpUpK7LaaXTPp9k0PZ+ByRiOp3n2wR39f
mKLtoy9bBl+5lYiueNsE4MhdJIq0/H67Bg/4M4OlXQjoDWr8xW1jgjKYTlcBx9SthhuF9rp3hhxvCIv5

6ZTrDZ3PGZJAo0+177olh2AeDkMqvpP6DCaF71PeqKueesGogoku/zdSbT8kJgLxVc/C3/u+TFdt5J/v

Be8p09GsTw6TjUr3CV2B5SbVUtrI3lpgkeEpObudBf
GEwfD2OOLxumXABITTNYIgWlkd9d9UxV8bhzj3mREiFnFJ5e5+GSLSCNIAV8nhuCPUwmSIggbsK/JbjS

1eWon00VrMJnXw7c7T8SX4CF9Y1xpMKTJz20ZuzJibNIQb/+Ts95G8Qfv84X+dB7cAVY7ZzSklsyr453

h7APgWMZoDQ4v7ZoHRoMZv+02ltjbr/7EqDcTFWwlG
pKd6QYa6I/ikPiLiIdE4oBe0d1PHbD2uRiFXb87TpCwCP4lXWOp0eOyEDb2ToXbk7qRka1wuk2NwLo6w

aDDZIGGvlpa5rVKfM7s70ZfkzeEo7guVq3ojXAnuI/WeW2M5nyGF18DNNspa4s8u18qxbNyMcwtARdsA

+7+lsX9bYFwWQMouMxTiC7Cg30ghlnoanuCeB2qT4i
AnN0ZQ4l6fg0Dy37Csv35EKOXlniuG+Gp/XoyME8cX6dbF2C9Muqimh7bs/71B16LbGwrK9NIz0QV06d

x7/KmFvkaTlgqCb121cbKWT1oli12gdvaDykdc5jnxb0wB/dXc1MRwQtUfp0of0QvUYTdLmdtZY6VvY6

Ghb7SXVCKElkF1eUgUxjwFJCWIN3+JqEJn0q8J1ERt
0mHzlru1sVz+ujZtFGvg+baf84VNjVDySw4Km5DVg0h78dspg0LuPODp+Jjg+wlpAZ0YNJ5o2u7qaL3O

yijnNecOG4MTvCu8o16oJQ4sg3X7dTWtbuBNmGdqoHKB6L05L7lx9bhatPdhfDrGNpWs/glAD4Tktpp8

r0CfckRTxdpbd90wYZhMVAbgOSEZtOyT6y06PX+0jz
0JvQ59jPnh5kydlJBGZ5gScOyZlLLVVnRdZflLuNmCNf5oy6+ooN1d8nJNM1l4j1Rw5wljkJSVw/6/5s

qYrRQdkmb0A8Nwx2UN5H02TT6gtHvYwYfilzs9hZ3oVDvwck+m6qD+3lOuUf5OPUizzuzjBpxuGp+IJA

GMdNCRWVh2qtUwUhtaB8qV1oDA3/HWXbmEzSIkOKOX
cjVeqaeiTLocgLiV5gjvizZXBO/QcTtE8+NEw8Lukfh+rGn9A9zOndISOxSIz4+apNgjEIHRwKpshdDk

iHQlz02ehmdH05OJO+nTT9hXw0KN3jsqNb1dnbIW0vLOSwAYw/Ijr7/8ZXyeRULsEK1aW0CbI+eQTItr

0MggNIbetSdsNSR3d1Fz9dNZv6+cSwC9qCQZA9wN3Y
H8uwZ73h5ZYBFVBJxNitMpfrakMr8UBc9Xr14+XMROrEiROCuiyjGs6s4OiB7GpttnbC7qLg2s1jXXTM

yZgwVMzHDoT4lEukx3sX9ahTiDmElkKrdQnK3aPUukPgYqsC4+2qQMMFA3pG1c1Eb2OWts9wWAJcx2ye

4XprEo0zOxWOOS7L9ZCMQg7GWjPjR20DQOQmgwAXgx
lHZBVQcA+ccqTVvD7QTSQfniP9OtC8Wb+5Tg1v6D9jdMgClR9+L3GlS1f1uAWOPZLYSur6gdURcfP5Az

37Gg2sxPPFd/PQWdbpAxPwRRyXdyr4WbGfSdFYL/OIOCvNByGS5zw0T6pA8EgPtyY3/l0Ok4lIxQmR+A

IDwrwijTIt3FUg07xd3LMDOfDg3H8n+6v8wkekPued
tono/AmFwGEI2Tf3UGHHRWbfiqpChXm9z+GGUVqmG6lvkhlNPFckAfqTtOftlMO6K+n6fAnbYS2NzefWk

ILm7aw7SeYNL1ZkzowJSt0Ql0/ggQIcXWtB1rEo8oKBE2i1d2R1anvy7F5J1kpdUOApXz8AfuKO1EF0s

CeBQjnRlWMmeE7Y0GX/O7ItWUHJE2WNTt0Kkqq50qD
lM+tV4IUFPHevuMo7CGWnl0C7yhjjvxT5nPmMqJEO9+i9Wxf3bWLvMUhvATNnlmLchqJ81X19AVO+OKi

bznEOBbEb1d4/8xGSluytwCCIbyRzT8JJ9rwGj8A1rysTCv0SuWv2Mm4rVEg8bNA5IXBGZdAb1iF+OLT

dPCyh5QIB6MdbFX4Et+RvCsA+W5yHjKlQPQglZqbP8
RB6TUSV52n0m27/8Lv3JwNEsGqNAM2dNYNPnvT2DJfNskbymBxOS3B+ibNj3z2qd2fSRSKniVFyzhbWi

ciBM3LTRStiKrR25n/KsrZSpKX7RTK4eSpWYvp7KKAlnqHICHF9C/2air12QYstjmqZVGzXbxOVbZ5zj

wzI3dppPvWy3svhx/LCzzkS2hYAnbFNBkZW7zclaAz
FNv2/vHxj7Og7DPCbBh5zU7gWvH2QmxH+QDYN8PnH/1r7Th6iz/HesUl5TAQglJqolhJ8GbpRPTMYM3o

T4qSyHV5N0dyC3iSZ8IE3tteI7zLYXZPQKAYvq6o6ZAwO4pTADzkEY6V6aKWgQ5LmCPHpVsL/CeKY39F

2PtpYIbzcAXfoHxuhPboz65rYQkCfy+G08q8sxupYy
q2Bf7uvwWWAUwUqDFbAy8BZIqXa0cQjVA0BSOmPGD8yqAokiYbZ/SGs1bw2BmXAlfFaeH/EhPtTlDQI/

ae5W4DDFwky2P6Do2xhDwVrZbmUQVP51txpmhAc2zpHNx3SjvTyC1lUxBClSCkcI5p5pJstzPWiAeC5D

pKPsuwLMJ1bY9n9ysE/OhCFXxy/iiJkY6ce9FQW+YS
DIsqXpzEeNoc5VZOq5NnUbJ9mShn5Lnkp7kuIeeyf404jK6Odf3Us5zJ4Te2R1hFlB2V0cCcjs60R3Yc

FVWLKBYn+/cQWCQscsZHnhkx2UbPfrTWvV3rD6hrI7Speq0fTsfdp3QjtUsBVQsP6Vf5neuE6sAEiXMW

lXuNVW7Q6y1R9q8Ap/1UL1kmBCRBV1L4sX+SZjaO62
lH5qIffMNxyZzCZQkNDYxkTfew3NIXvDRleRWBBIhOt55oJyHgZdCm59pHUPOo4AOl3ce2D00DsYPwB+

eGuqGabN9tjh0rqqVuoqpVh+RdJoDKp8J57h7o8LZoQSj7uT2xw1k1yuvizerdlEvpcGdBU/W1ry8DmI

RqrqashTTdPaQuL3wRgXHOKrUrE13Z4YaYRTQlqxs4
tcMF7oy1Z7D+PV6Je1a3Qr4CFil6I7ZXqGzsxToYV6o3Dytz2urXtYh/00l2blNcHDUx9Xy9VZGUcZ4I

zG0LU/NJWzJCnQLVQIMuch9IdJ2AWQzq5IXIyJCAUv4WSrjlO1WaGJb/0O41FfzR3w9PpF78cyL3ZF+l

lccEHIxOJNGVHr/ydOIKJsMGCGRW2rbGop4McVb8vi
syvhCIwWhQncNqMn0GdgE9XNebGolRejD+Yw6I8Lm/9SNHJ/zig4dAyrQKAht/TiDTCLPXY9+TjF/skU

W9jWis2o+TNnJ+9MOddV99+pfNS5k/6kMo73AgWlwfCaGhM5RF2NLwC6NHlM6xMJnZhKLw0CvlCDwKT5

V6ZfSjbFBI5kaaYDkGw9Pzw0ixKro9y7FTBApu52Um
Pcuh0eQJKhAozMFcUuczU0KtOqn5pejOdk6jf10nWi9F5keJtsK0PY72eNmAa6PV9A8g45cTVIUk/kQg

AtyvEAdoZDy4l5KlkyLmpvJBsONWQqoPQxQAFmDQ7GqQVNVQhHd8lrwaCRbmJrP/IhJOIZxKDRs0w9qe

Ksb2XvPQe3AfucSYsDNJDqs/Wiy2UmSY4TNrb+dkFY
IMt7J77ZqcpxNI8rkfBhYgVV81zF6rnXpSNJaEP2hAJAG22BfQXOQ5FicUTfoOjzPs6nXAouJPj8IHbO

V8YPPeaAuywosqpHRKmd6NfEXijXgUgtDMP+8lQ7uYbvlD6fY2re8XfxrQIT71uaASw+h5Q8azU2dsuq

6hmTkfQg+GOhH07BsKohYyxtw0m0S0Wys3OyLppMRR
mCe0FCpEGLk9ATl7RhoNh/omUfwPplY5j7Qn73eA1iuL4I0Mbaxv6/OP8cfguf/RVFVEf+uVNHs4zesk

SYE1ePkMzWOpW7r7opnGzj3ZVCuonLD3z/Nxg6EHMf++uTeP+u/XWKoU/jNy5ZdqAUzKgvHbc8J38U4P

nZUIJ45bq6yGDHuViaZ4E4jTvLux92vaQjiT1jfahq
2LbkQOAApFzX1XxgE2yy+X+oLR+XWUscJcz3X3Na/5h0vzHN3OZVdVPqHg++p6rAdk9CIqNgEubyYTjy

nJErdFmRoNVNIWAQ53tdjYkukqeQO6UtJPcwf1EI7Bu7Dk15LFgLlmHxma12Wxkw/OUM8sS5sOugEx4X

Fy1Ec+CzHHfRxwlTKMOW3F+koqxe31++q+Z7Q0xcr4
BwkyKVZhv2SWr/bJLBsbyFHerbMC0MAPbEUcFu6+WIfGmBwFEeelgKmsQg1tnmXVP6Hx+R0LXA8gGVak

yvHl8d1ohwwf087sW2XmFFwzUi2DmTIAcWAHB9c17yCUAdKy0d9i/FmDjvHvyf/jY4q7Cr9v5S4M6jxU

c35rFV774I7XiNwx8y/4b9G/Zv2L9h/4b9D7D/c7l5
bEFhYCEOnkjYKjdsk7s+7U8Z0KSTQmtIxso74MHv4KwmJdqahaVpTTn3LomhE/g+YaNJP+idwUIW6lvH

m/LkHwkJWPmfSHaTfBmlqM4hbM+N5fzfneXPMSpGW50B9yoHla2tqP+FMq0rW7wcHHrb9vcCRS2rU08b

Jz/3+1sYuXGujhtXj7dqtFD1c21XzvL6Th2i7Nklft
ygM2O069gJBpE7A4SWOzOWH4haneBDb7b3kt6hGo2O4zZFRgB+wefrxeGA1x2+TKwoD9lXD/HysMlja4

lQaEdqQc5tYW3W1/Clg0ntXunpZ/pF+4KCZ79aHDXbIHk8c15K2RdvzNVQeyNgl9ux/RNyFltT/jj+Lv

pUWj5jLNDUUtGsppjSBg0PZORtxRq+ulkLj39ZqPVE
WzOrIphWjiojkGv1Wqonilj32Ev7r3XzNIb4fO2+ceDsm4AayTFiccRhyODymTpF1GC3p6iUZP/42zqu

f63n3zU+s3zSfbPsoMaNyX448EgUUAQ+gb6ge2fcFHQBqVRrvDTiM8oaFbleJ9iVcGdnJvCIpxKIKaAr

b5A+3GiLSLQ5ebBqfwPzL5utbLa/pI4xyGFzY7qxcs
1jOGUH+JY66EvcKzjEX1nq2bYPuRyURcmon2PsAx8YFglgmGQ1oq3pdEiH0FDD5cJb0Oiya0v8DIBJpp

jq5TEJC3wAA++XZvVLRtJvNa8NwXLz3io9G72KtuGyTsCBMxOKlKcQaeArDehrw746R0ZDgnYHslZazn

6EynvtkKOIl7FTfcvOZyTEP+fCa9fRLr2BTjcthV6b
T2d445vyTGS88Mk9yZB1bLZQx4OcIDSB14Paw+NChNt1DDyh8jt5XWRV1gCeq1+1Z0o6QwuFSY7m2j//

zvpJ6HlNHQOzio/MQpmdD/K3TmkG7/pPZYqDak53ggXiyjtPLoy5+iBB9XtNwIKNiwrMirl5/Rds7ELl

aJgtpONkYman0BMY/CWn0tz9ojktYg/NnIhSlTINgR
aTlTiQjMQd2yJY9JYoUxPc99BG7DxrsnUxR83YCAcu4Rs9M43SCaOl0UhwOCJCC36pZ3yCF/Q25i9VEA

cLQlh91B0LbagN5mrMgt+3iwt8rzJ/+OZzPiT+QMyeN5a/XU1R4gJXR6lUV6iW0QPj3oUoZgzlY/cFyt

SIODwGz5eCmjylGqD6R3gPu59bvDxhIdtzpjDwaKfC
dOMX4dWO0yFNOIjmJXA41YPkaExvq20TqsLMWT6/ct1CVOCp8nZAZTBdun1pNNIErY1dxV4VoRhItp7l

EGBrPp8v66ZY4JRhMjYJG6zG3nq1/IOXqrUPbyKN9ogiOh09chriS36ug7JaP+cU0r75SKJ4pZ7vWUPT

EzvfejQ+qTtcANv+w05kwp/jLWAZ0FrMyWjG6xzjRy
gLLJYltHTeApO2Kk6VVIsQTDRcH9LbsmddojIZG689d0HQmd9IY+H3XPrieUk2twR/wc9i7bvvktn2at

xXlrPJJIuL/Gwyrry+3IY7pjrDQrbbFUuW37+QkRJuG+FRhbrlDbqBGCjoetJF9smAU4edUx/8dQzeIg

Oso4+hP7IYSuH00YVh4XOgVf2es0lMv4bQosG6NmZS
mAGAZ0Q79wWNXoOkE7tmXPqHvya8CgfzshjzSjQPErMULWDkJrtaWAyLurwoYHbUxUKBsNYx0V6s9O0W

bqtbM78JZ8bvdkeqCGJdiFKONzzUTE0M5O3z8XD0M7Tb+xjDHs8XxC4fy6DpAUOXxosWgDgOGtfNIlh6

VE6hz+oHpoa2uYN92VvoE1QEjwF7o19UX/ZrQ149aB
rDEe+rFKgNCSQ7V3Pnvnnur3paq/zNN+NYOSKCfLLSecManHrYzzLFQq/gc5XhyRs4bwJ6bVUla89LQ8

USBcggeuLPVDTAO+2C4+eiaZ93pxLFCZ26v+GaDmzni1wSvojFy7dDBEfOmw1zaSycaDVDvOlfhJUVPV

lq2hIKY0RaQAAFu2bMrcB6Fd0s9yw2qPYoq52z+6FG
8HpD5zG3PMfz/GVvywamfGt12WNpT5Oe4rEyZlR6bljxpeH1OkokVnPj3BvwIAaG0PXZX0aHywUcF5rd

yvh5WlMLIqS+BIBlSefx43lIiznA+0NDif7Y6sd7v8Y82yt3GoO7nX1psETq/u7O58Gkgme5SfmoBwH9

rzZ47AGBCAidPFaxnqhcyRjccstiampgWnyPrzd0dF
P59i4stFSvjd29Ph9ZQ7ghGtLeF4HADSU8f5B+u82OIetHq5q9ayAbzmox5OKbEPdvV+dEFGrSlFltHN

M2F0JDMiwlalSsvQGLlMFIoeEMBqjLg2JiIkbNIRm2kBBIJ3MJKvMJtb/1TEvnmpzIE4N2BQceyrbXR6

Ljy1x7U/TgBMe2WUtXIEpl3pyKs9vAAGTW+M7GIh7k
7ZvwTbcP3zAj1HZE8KlIvTNKuk0/wejpYLNCDrPl7LPuoB+CivefFCtcsJfVE9Uv1HFp9ljSIVYMONkP

FyHhQj68EcMCEVQjTBqPUy3NJ0Qh/Y2zddMi7Tq9GPetGqa24ac7ircVS7CBbdKJigRijH3clJXkYity

TjVMCotGyGCiHgB4SAvD196kHiZAn2TUwCkM2GzqAj
n+m4MYDcOEYB+lpu/S82EFF71JwrDp470acw7m8HGn1UD6mVlXlkMAS4rA8Xxdx2Pb1aRoZmwObE9JL4

GElFemcDD9Q9yv/UqAz5iEaFUbupYHdAJyYoCnVUqgHORPr2yClaMbSwBdvels+0hXQuRPi9rovWH/wH

dCorxnKLlKXpYQ4WIY2J4J43mlxtPXL2r8AZf6ghGZ
08hzFhT7RwAeay6DOi2fpArW4PUmC0cSAh/SxG3A5XyjZPwTyKY9MyAvJkTmsk3xjw39QnHoKiHl52Iq

eomMLLHcgNRrDx3O8LplIFKrQ22Ax5XWCLA1sY2iJqOXsG4XU07sri8dPU0TsNNn55EA+LFSb5ayIcpm

FIuqNcBIKSixYx5cZc97GWU5BJSfZ0efdeS0j4NDyL
7a+8t5iaSzm5S66Tx17gaiK82UcpYqarSmQaIKwmIyon/UeBLWcKmllQsLQnlDY+Lb6RvSLvVejtC88U

8hawD887fc1nPUB2w3n56x7h4EBBTZ1qnbL4aWd09YPSahen1fatbaSaQNaXgupXIQdGve9fcFZMBjdy

4ivVcF45oS/JTXmtvvRlHPwN5wT+72eSUi6j2u68jt
MjSDHDtke8X91/p0T11AJKX5zsisUm/FrHPmke5f0o5d3cLgvM0wvYSR2/DLh5kV7OmXwuqQugmNWEuy

DF5xmbLLvXY4efTnMlujpVD2agGZgwplIQ12BELA0QIl2m1JdkyJ8GQUQqdfv2qUWIHNMR2FT6+RMoJf

RUh8SHrh404rKifDuIvASYX6OXbnty2JSAglsiBlGL
Vzrx0zdovbrBBFkiompmn146F33jkCMCYEv+cB8fOMT+gDm2v3HOBooA1054elvU8KCzKLLP4zqokVO9

Wi9mGbltEe6kBNi5WvihpZTzG39nI1amxCYxJaHr8zJyGAzSqtQplh+rKoMAreKP6QMmFYDW669Mj8wQ

byOwylKoogqJUNrZRyNf18ytuCy6ttrn+S5dVgbDlV
wJh3CX1YXwaM1bp2ZI+5kM1gw+4hb//nRAIRDL8lKOA9EKEgXBSfeJvFHk+qn8x00wGnWL/hjzffbq+y

cUDWJhB7lU4egCAuEb9vHUOl5dkIxpKkV3QQ11vO6KRSKNMxD8plK42GDoX/ND0E0IXj+EKdC8OQZXea

q+HbK7lUEhOE9PqHXLlu8MBDFW5dut7/Gl4/1LrMI7
psy9s0fPfmzU8rcGvvQMBX+ecPtvruQ7xQOxoe9LYXQg7txj2S18NX+VuapP4RBk9SuA17jW2ze0v148

T/b+LC5jzXc+qlivcBh9b3i+fcdV3m5k7yHcTPHS5hC8aKbXBD689By7Uc5NyRb52mS+1Vicn3P/Nirf

OwUGWe2nNRzrjhyQaQodKIFFVpQ7/crZu7Q3sdSgqa
9O3ZXgqLQKLCnKr70GXFS9kT3mmNG5HN3v2WJTWCFaIkugcOuY+VeT//A2xxh561/oGNgqUvPMp8hwvK

5Mij4r9MleTKXS8pXi+cnRY4I3RW0FN6MoZvbHS0Th4aI+ZyKaTuwCo9nvKXciPOyQFJqt/cuhTNy2Za

4YC1A52kDf9xkbYdhy8HVBCockL+rAcILrpq0+I9VN
6axSDstNQyBjkRCRhuh3oXZystuAI8NkoWI9+MYB/UurKWExvRskEXOJVy22eHnDg+5lq4I+8vWQ+YTF

I25bE1N6IPvB0WKD64F3FjsAIjyUevRXQ+VDOOvd1JJxnaQzFt1lb+kTFU0Ne/CirATBACT6i12QdYcY

ix4QcKlc+ttL/uLQ0p8JSGlfFEbt/l3xkf/KIFYlAj
E0Y8b5dztPvUqeVZsniRAjrbnrQT7TE1RG8RnEmzxaI71ncSyCNlJ6tQa9ZMQJkhC7kvxntMRSEPOwPJ

b10OgKB9ooEMQ5NEzTesWkpZ5TzmATali7jof6Ul114jK4c6Dl+XV5tMSg0Zggv2kLWEd0oImPUs20xF

XE8vP2GWQ0h0cOOJpyYuFSTuD9KFuFw7M1UmVjSya+
a5cVT35eD4oWcObb58y0KO5aMR86njd8TK/fU2HaUdvyQidC1vLJB4qHryHhYAlHyk6FOV86m34kNvwz

Z+U5aMLDTvk7sNu5Eig0nfSyfhBoZW79Soq/7o66dFYiIPeTSDVJSHFUp9x804dhUNlPmdYkwyXpTJAV

rmH6j+2Hk937cRfohter5N+FgVVYtzzGSznt6bEUwH
pNL+TDISB7lzt68ur1cg/unmvSlo8uWdvCsZTmBAmspz06ORt7LhQR2C7QadTt92fYJ9SPte6ZT3FpI6

Bn8OKB7rgEoYVMDvel94eVy/lYxFFQYzLGR8OAsMF3fmm9Qz9D+FRnWHCGEPByoXUhaOFBMnDyT1doI5

Sq1HzoEanqH6GxqCd2a0sbjVNNFVn8xqXM7HqAj8tl
olkgFgkXiimg0eQBMsygg0qfNV4j5U8jx1PUy0Hj+LsLOyrZUdYzbUsNG0SiRIEFY5LAGMdDoIp4jJUP

DxTQqjuQQB0lk0mrfvdj+O0I6prmq6fHuv885F6phz55m6dWSs78l4H3Kn+iduSrO6CGP0DdEF3hD3Qr

C/SuT+fPiejM3mRBv9KGdelFwj2mgBpupfUt7USEuI
8dHRPL7v9r534n6XUPKvqMj6FH1olDlLKs8LQnS56aJEyxGOUkuKDu6QJPI4J4J1ncOxT+d646ajbm8W

dywaVN7Rn3ai5P4M0CW2HzYplCCHOfZflwLP2eioFZKLpUDAb9kEkQvExOUwPr+ruxGPCtj0ThrF8fEo

UJr9SnvJ9H6O2/RQTGHduIrNR0NHPwqx7+fiD9fHUR
Klit9gnma5q/SHY0fG1pK5J2AELiRsECICFQQPilz53sVtP/6+GNPS+MpoN6FaPfpPM/cPAOdVPITbnV

oByC+u9RpNWVDLFRY/2xuCtsUOIs0IItsv0SARvGLu+fgxPt8t5eEL2sWLLVyf/Zm3+SPtnSKXvLF5Jm

L+Tu5JZZpsMuQaNv9o7aZFrtySHSvjvH5pngxU0sLM
g5fwgtWJihO8R+qQJ86j/Cdc6t/i+/uW02Fo4UvbOUsNmbGnYcPSyMycqpiTUMGtONztkeS/ILO9cKWv

D2Oqi65VAkLqa9OUvaCNLCGs55wNS4d0MaVM4iV8/BS/ePdN/cAkBmaKRidf4e4zUHqQCTTg4aWnWrbQ

wgxnL33OfMaFJbakhzkRTHOc/guQdbw4ZrZ90qz8M8
5WEfMSXgSlczKrYAvAc3xQTBHiqbnIFSnfFe79biw6PGzl+U0BMpvZGSP0gGHs1H13VajPz/LxtUh7Wh

R7R//KuC8FgDI/DWaYNcNUD+RLcKviwULDVp6lV8r/QGAWH2SrNojQp/kFKMN2vDTqsxzqLGF0ii8nqP

2yHhgzp23bNMZ9KZ7M9TqkjEqquyN+5C7IEf8kdlcp
UJ4oX4HTA15JygvMRhM0rYjalv/i6f4hqPSWhHVokttmai4t2HJtcTgIsZTUYOCiR5B89oCDqD4ed45M

xCdvU/zshHFjXKbgQY3eE41YuL03lxXuVwojwsFTX0PIGja3kAZJg6tJY3AAcsKGL4jKpFeq8gUtPU+c

kvzQMxkyWIFXqp3yvgB7ObfDjsfuOfmQCIkerUs1rO
h79sc08vVp1d2+tfmBSvXAzoSLOYSts7wU0Typf+17SxrI2O96ME5CE3mq8B5dbMy7BUaDB6LZXSrAIC

joEXaxrgglXC1rCneP4PNaOZVc6cSvM/xb6Btze/EOk/nk8hTyWeD1nnmIv1yA+BzzZa76iXieXtE8Cx

P/pktBt6q21UCtzzw3CW7JUgG5vRzZwDC5Tzv4WjuQ
S+gLRgOo2dzat+bARWNnK5KULJikDbTBymzdC5eneYCg0QHk7n/xomsG980pdc8g/AAAwJoYQNksY/5B

C7jaNm5ZIGIJqfUDLV94HvvSHnWV4Yn49sYQ+EOOttjggc8QTEQQs+0iNuVsp9jHA5B7iNXeSSCJgN9T

1dGG/V4EYJJ0vK4gTb2frJucy3LR/LGfDiMXRLXGm/
H2rVOcw7hOjIX7oyY4O0vaLaChIVwImN0++mPzyHXxXSvMM32gKF85GP88T9UnyOiz7WbgOnvSPJsCZt

bMy9mVc8Ojcd9oDmnBw8/+YWo0NWvtB6vfskEioo6vAhIW7QfkU8aZYT17eDdJEv0y0e6zuZmPPtpG0Y

A5CfnLIPEzz/DBcJajysKSSgUoqy1GAFEfJ2WGOwPY
roPZCy7dvig94OYjeQbXqX6q1tpC2+h5RqFFKs9S8wh2BzIEo9tul5Km/Te4hcoxyqh3lIrss4VVg242

wq4ztP8sshthZkw36TDRM1AciaRseudV1+qhSZQw7sRxPBLzvgDcpfHz0hazGiFhrsVFx7SYAukXCGur

9oEE4dRZScd/t1nwNs/3ASFYM3wLRLaC8MZLVp9kpU
JL3mC9MwMIo3sM1ttjqfjIWNdDYif+Ecf/oZYpRZj5+o0a03d4vNZYiDx5moVlU5gjn6NA4unyuVF592

29k7hX8fGvslFwzprEgsnbZyeGLqDQKXZ1r+hMchgb1stfS4YYAokVlVwDzLiAigGd8AsWC+ACpBcQ4c

gGS7UMU2B1uKeV50DTQpD9tkBTM1KiqFTpwUPussA8
9yzHYIo6ol+09MKSziNzJq3hm8OEz9fjl2lpX0v0g4hv5XFf62rGLnfSM/sGo166IuEpCXk7xcU4lPfq

Eu8wgGkmQnPvMQ9PPD+4HX8LcyQj+BRIPMUa4/luIWGYCv06szkb9BrfLN5uUWooGDmvZlecBCBq+h1M

w5jdFaYsEkxRn3VME5tUf74ZKB9qEAk83ch3Ry1vu7
2DnSrBnu9HX8hQHKCcbIztVIahqchySrJSQ/RSn7hCj6D4Ij+zUNKQ3AWgXChe/YrzaVf6oNmnfGtBoe

jeiwS3H4Yj34jQ8yOuIMSk++9UmApXs3IziBEmYNILrc+K11xt1ybG84p+mdqaV5O2imgHkpRfZH/1Xa

oZ5cV4nE54wDhuER+K8Hmy3OWpt/qjLX4YgbAftJ0f
2+Uz6aEZCOJGaDnI277IrFf9C93EgxY7Rsf6GXwB/PK0DDnUKb23MBWlWSRzgyj/0oBijJWUpBtYsHjP

cVn08aYL0L8cO4b42CDreSqYbDwUmlCYZse8E6a5TQrfoyWySmJlt/fy1eLNJ/PLj/Nm+vO+NeGj7WLE

xGxRepa4gFi/fpC9b1JGytzXynky+/SZ+oKEft6HIe
cqFkH9oPdSrI1dhZCh6Ay1wwpOLJI5n+Y/FjSfS3ycwrIXgRQbdb0b0wUxoUnwN048gjmG/BgD0oOu6o

gMw4j/KcdoK1HZq2+9ogVZ3NGAiTs/R9YO+Ur+o29QhmWj1AER84mBqPQSYVe3sO09duZYPB3nJ2wrFl

B6gsWeVmoKDkHNHNwDR3VUrWQjRkfbLU8nj2zfhIRf
+HLwS0X0NPP5s19k9JKFVd/9Ea45e+QcoQhja8iS4udUW30oMd5cI8M2OUoE7zJX1UuAwTodkRJ3AmXf

VcFsxL5EueRDHCcCrtcN8CIJAp1aRCaU2MfWnz2+pLRS7KbA/Cswf0F9ptjp5ZzUPQHaG/puTNCfWNH4

Mksv43OYJdynEdyj1q22df5+2YJmA2NK4wLAZHrOJQ
1qM1X9RSeH3YSxu93+4ZMisl2HFYoCtz7N4Z3vXX3osy4Y1Ta3+oYDiY9dUk8XlYkpIxNb4QWbcGAPJM

8fOfVEU1E6vi8x5xfDckIyR916TuApzp3l9gv5hqhb2b/xrqB3UZA3ba1VUYq19DPpCKwLiZSPB5HqpP

elfeVw26k4vL658rhT0w8vdUmsF7ZEfrZ9ciGOvSQY
yMW++wswSX9QGcW0FJyar7CztC7KhxaX6ArA5WK+9EhgUiX3p0MroN62EJsQNNiokP1rQu25TMxeFqIS

DIXi/GwQqTv58ctuonTP4old42BJmr2ZEpDCFxKSk/caFcC3o/3WLbXjUDFpCZXNhbx9u2SjP2aZv/c6

c7e7kiQtgTI/JposLZ4YCOYz7txI2Yi/v3hD8SYThQ
yyqS02uOdiLVf3aGmtopiQWTFYSrUa3Tdz0xEdnjYr4dnzmhnJwHZTPaQSopJar9UesH2k074J40T7gq

CQcrQHj4uYRpyJ5/duqA8BjFSKC+TGlRUN3aGw/ei6K8O8r5y9LmRPAmdunMVf1odXxeIP4Hm/WcJfNC

2seMzMHIlb63qp27w9E7BrcmfaGv+Sami+GlVfaHKRv
/GmpBOVKoBNw4rrmy05zNC+XHE6yTQJo/QGkAa1YIsF5Rfdn96XKj12x+SZniCZK3pwzuhnBorXlxaPI

rfbfEHub578hQunWHlOclj6/mXf0SYgQS0mw3w0IuRo+3IPjCTdoVai9qpOPafpVt8yWRSZRVI9K8uG/

6F8EcOU7Tf9636mEuOY4m56i5lTpAy+LOGP8EVuY2s
m1mFdjvHPiyknhfgLtoEpQbpI5QLreK5Zn1yj0//HwT5QK2jMmnQq0+HlV+lZMAHEV2vxDqlWY/Jv3Kv

Uhc1f11v5GFclD99ThCE2g5z+V1IrslqMzmu9q4Sp4tvZHgx5JYYnvg4iMb6KF/wtS5/xoyBORfCLnrD

habjLthTe2LC2VwBtofDk1ZBDMLrzKKMj+2SCRX/X+
H4XYQE6r0ghZtS+3vaHtLwKnRAEh2hvj5rtHHbBw+RzGfo8HX44gR3LZzwFzAhAmG7ej7pSqff129mge

cBfEePqXmHX8jEJuNCzOvw4wkTr9BAWsH1INT3vGNPSpHUTrKEF5u4TM1kosmrSVzqEK/W1ttUVZBxME

R7wc44sjbEUI20lG6TfHUsTC4YktgDnQrwnikxe8lC
DnfdY46AgKpGdzFsmhaIJdr44+YnPN6HcaI8I8vh42YWiGqkAhpcebjhyj+bBE0Y+Mx/Le1xNqj8q4ph

6Vf82e7858VUjeXjPUP3/oMxtcDYXay9mUb3WXTV4yLDsPjeBk7how+Mg1dndKU41+BJCqVzHLoHy+Ip

flcU4i33y12XnVC4jhDDoS8RFrcHMRUE2vj//Dq240
APzx8q7xdEqz4xs+GOKzdRWfot215FT/GhqXZ5nHLyquGoJF4JZWaf8vkNhHT4xrzvNkVOZWQ6ivnHRg

ki5Flxav8ovyQGPD5vdHcoR4bHWFsYpSyawO9ailGviCTuBNfUa1Oy+Jpe7sMKqOZvSGv+zJHn/RJGoW

vcUar/y1JzdeFD3pHorJNZeoJMkW82rzfSHTnHOHxv
ksd5CvazLd7yh1TGcNHBNoaGGE9PGISvBZFlH9uRSzz1JrcDpQbpMO3cfLLuNGgJr5UU3wB7e17+fvZ7

2zvKjwIR/uoCrFHdRWLdRFTmoOCY1qlyUng0EB/YP/T1eIEv189gUG0B42D31JM2Ebyhd4oXu8YDqn6I

ZQ6x+3T2G+MRsl7hK0kKM1s/At7ZbFLe5f0PRX5o2M
Cq5Cqp+s3Bx76swdSTsFPbIXanqptheSIT9eWv0McooqzSOtzGOkh6BxHQ1vbZ5Tj1BW12ui+3/sOqqd

aLh1Xm6M6RJ+brlzny3/237qcCEIPMj236+qth1wv4xAu4Q83Zpx1elESiqDyFxNbz30/wdu7T66FiET

E5K5AjP9iG5iqN/piA2iZrXogW4WjXduaOlCTJtgJx
Qyf/k8ZyOLCbaWmur3AHGc1c3PgQ45fD3DEsKzoWDsk1Z6alLSMBz3ucdWN313P2HC3OheNeADxBJ0fE

ZgtZAeaGEh1NjNhwb4kcmxIDwEijyzxPv3/cOzIqq2XnVCtohnSwd9Gh0OoJPghJUqBcavqmwEg6QVGs

t78sLmjGlqDT2hKPQMo0uT1v38AW/7DjmsYfjeQDFQ
hwIzBYGy+AqneHc/Y6VsMtgdCFMCuPeAx+BToxSos2KmC5n7TTtMB60ZUX3THNOeykfcQpQpp3yNiM5Q

3TumP3mBS+q5MxloRVtZu5QeXip1mfAbbU6Zfe69u2DPPeetONohPZBti/yA/qL3HB4Vg3S2ouXVghK5

MkTSz9DyQMckhtlzaDf99KzPp+xUMqB5WEQgo0x1Y8
gkujQ29ff+yAB78zZyk+wdu3wAP4Y7gRQaW12lP+3wM9dzPUEhIaA307n41v2YE6kng0Q5zmVXszZloF

9eiK15nJqaqABbZb6TWEdv++BNqkRI1t+sCM1RgDIAq3d/5bmYAA7ydastpNER5HY75NlvZK8vsNU65z

8iJ55gKAjSxJ0fEMrvgd96B6+ZN/v0vBdQd3rAitsi
M6ybrHFFumBTdYRzzcKYW0L8Anp3+e3yTj/06U4ouWU2HeaiIxmBEC9jKzubIdjcDuslhiwGCRcmin74

K01zDRveUaaHQFQpEh7nHPJvt+cIhy12WLsDz4OP6ocTzuhg6q6PMCxvquCrfDot7qR3pG6ukewKU9JN

x8ffjLbJ/Xc9lQEbrqSPNgm6rm5K3YxeoqGIyuHOjE
mX36cYFHQxJtnVbNRjhm/4lR/I6XgZ14CdhVMN3wT8hMMTLE3cUdEXChWdgsx2Slh+2/UmvG9EG8WLbU

G/crjbOYDscUdbNq8eG0DW+q1aqUmEJRupiRdI0CgbDZsSdw4qaIVT1KWgRdepZx0gflMnHOuibFS5mI

61adbfD6R8sklzasXe3ailCWdaXxZ2A2QOJY4hdpX3
dRCNwevKKB7Vx075H51DRj5iPME0FZHtSSlRyfz/oau8Nn5yc0lu1EbtuM9WxHn3TUj9zFtAlxjQ6m+b

9t24ResUVuj/+gGXQDd5Jze72UGnXW2bLcYRF9a0TTXuzyM7na5w+2Cpoai1S8DxscEDALA0BqGbz4fa

dnLt5ejeO8LIh99O6eh95bLzTrbRibNJg1Bfh1O9xm
4ojvSrvdI4GZU64WNN60Tm5s965EYt/VNkwP6nKWfFPUNxNG0ke6S3MO1qQNmcijA8QI8XMK4Llv+D5o

OH6CbZPhYzOiEKqkXW7T+1UP8JGrodufThKBZ9+UJs6qDb04B5FuEA6GMdfyEPSSZHXBSEMZcU2xihiz

k65DgeWPV/zQMpJX6TZvcbHa8Ap7tMiIQb/ZitNVpc
3QwbCQD0nhVBFPcvwEdQ//ZFDafra0IDpqcalrMqhj5TbU3PhpJfmjEjlfGuQVcnJmZ8ShFtT40Tgklg

x3p1yrfV0p4y587klmdov7htBjo2XOsvnEC75KR736ITanQX+/b178ivXr25uN3DFdo+HSIIOCLLAhHi

nhrC8QFln6C++uA0PjajwQ1PQrgn2IW8/mZxjp2LAo
aoVLEvVZKt+9IWo4HE+YyoCg+r8r6M71bYVYGsa+ojPmB1xxmtL2mAxHbxXYwGzYONh6ly0KIJxoP7d6

dDsKQWTVFXCV1yCp6DT/l0dX9LdXoySAobaPR4AqI5oBwZDcb96IP3tk/36g+Iuq2gLeQ1hL7CN7qgj6

aY2wWFaICpTqOJoCIIF+3TU/HTDwusT45NFglcfRVy
DCbViXezPnF8DEHmBhJoeEOeQHcx3puHkQG4/nJwB+jVesrDvpR7ahd7uDpqKDrhznC7ThflNFOU7wA7

UEVZbPkhr2fLlUvUZ9QJqH5Mbje67IuiT9jRLl8a+y1rMqkQp/3g9nBEvCjgTpKLEbguj63pTdbrWWq9

DjvyId32ZJZz98LWu0gP7jGwUpZvB9OQfaZZ38vmva
CB4gopVqBCnUu7KACpzpkWEVTv4JzSV/mFfbm4ztHQeoHLd2LPAkce1xWdst9NcHnhZ/spi5lk0pVG0t

vehVB1nnjs6fvZ3F9Uje7bqvbRJWr+AI4ulk3lxaGjDsUGnqap8Gdg/NGavCbBQmVbqfcOjX7eij7N3h

oTDg2GgzdTRxLdZ0j/bDL/lydSGH1HzLoHckNv/yFi
dFQJ+tS0lJd4GEiPVjOOil4IfCRS8QfHiqdBWVWLPP8BB0HWT37zAdWS1shsMeUqIiWeMTeufv2+IFtT

zOmPrh+x19TXUuzzdALYM6rR9ZbGxsIsyULuKPpkIgL5aXthU1Mxmhvjw2lYqcgQf+gRU6Gdb7nrjqLY

4W5naKqDgzJvb0yc06GkOpRchwY74Lb3xK4XqBxzWf
CfdbrTsRNN4nyN5yE7b995OIGLqIg2sPmQTVKYrW9HPjnzkjK5SU11SeCZ2MDlKjUrOsnTso2qb4wFir

rQjXLFQmbSWbkup94d7SMlgLqk1d+4bAKj0C8a05fzuD7FV7ZOVjVyReBvfNDxRPDRVkr4rr0I5u5K2z

evIfPn71M8bEarum8dIU/Lz2dgEu+r8I1s7tC1w5nh
LmJce3qrfj5NkUdMxKkhyTuxbbP8r0U+Rkq5QLGn5v0AxriLq4GSzER67yrI45gsGT3AQ4i0wObAmvdp

h5NmFjwMCyAFuOso18ng65AKvJb9KdXxQx7N0mbBEYXKbU+dhlkoee2hr1iIyUxH7ouFQipjPx1sVHeE

ocsS6yY9lVurQotxmHP98PdoItSeQ9FQTFNUaQ182m
ccUkqPKqTayqUiDzlp0wiWdRTnQxVWoj3zFku41qDRsZYFUVNCrwBZevKUhNhq4lj7o0+J//rXIBzSkP

chGxLywpcLhE3kKG7RaK+BgTDxOhadJAE60Igw3IrOF0YFSpBMwJLP05nU6qx3djg3Rq0dbM0lRaSwdL

bCWB6dFUvlpAE2wSFZcP6l2NFtRj4V0T0Nu+WwUmKX
Z/S0ymSHZNJB8LXm3Ci68vJ5lmODZ2k0gJSDCzRiZ49ytADfvIN9bnSSEFH1/597eDw+sjl2Ga1R5wtD

oY+AGHDC61RY+zsUwR0UxLr9YCXF+Bxbjlqvb+/IfoI3+0MCuXX20ihN30XLjPWVPtIYRUWFZx+31zP8

kerh1im+28TMoMlz0szufK9/kIk1NFmdTI3WuAkoXv
98LAUvV6HoCV620XYbU78BMBk7A+6QxGJ1D8WZP1DHwLJ6JPMrQ5ziN8MclNVGFo4UAkXTWqoEncgpdX

uW+6f3jnRWgNhrph38Pdfh6cmytQIVlRgKY1As+GI/D5fX46+ZqkdQSrOpUQSPzYyvsTuz3mcLfYATK3

J01AxIaVG9uXzQ1hUh4JdI9+LZDB8ctmgjLbsbAgNU
p8AKwqGT/4NMZgY9v9jLecr5iLKHcs5PWCnM2nPFQsHtqU7aOn98/+01/qBFWsMK1Ki6xy10M1YWUkCQ

a4bHaaYvuGa1X3I0eXIG4gPahJvoc87PLi1gZll9LTCqtZDjMrvXq3nA9CDA8/vQe+8kvrFA3L7AD4AH

Qv0Xru1ZnW0TYrhI+yGc3hg5A/b5ONXILJveVBwBlZ
YXLluxYke/p/HnGYGGUVTXeKelPrbVvq54AnCl+W3vRi3PkiiGTCt7hPu03URF4JmKlPZHa70D3t8e1x

y0WXBjvrnIYaFaZPZ45KKjeFbvsJKE1J2Lh3eQnajCrYiGvge44fKW5trC03SrOmSvBhN73upO5nFmr/

f87M+sStVxlz7uM6Cq1zAXQwcCGVmMKmE52Fei/qbR
iS0Oj+gEf4FY6kgIFvoXFd2m/yTevMH5ylB41sO922JUvhCb3/wxe6/4NuDgoip7ZdQqj+EsWhH1a12S

UhWn7CVbz6ZwRDT4UVHsywlbgoDP6dQy/m11/Sdtg9ytANzZETl7LXuW7TEIy/1ZJNjhKKe9XYu/yVHq

pzuEZgmT6KCwKRf2uYmXfxSIhM4kELtVXvH+SllJ50
e2DkaTuazbIql6G1G+evjjxfnk9nYdNtIcf9yqyjkBRNC4yF/EDjmf4RNqxYvsFyBs3uafCRlk+YuaUm

3/x8lijECMdz3np2taSaAlHrrE+s0DGCNkMjcZp+mGtJE7bMzyEe+UD0Vz4Hpchbh45b1ZFeqjRU4axM

2ynGK1iJsRifYo8CsM9nbxBhv0rl15ey9WeQNkQQ6q
4z8+3YkodY53gU/5g/Hiz3ZH+7RmMY6oIQahMSQGia9E7LHym57uJj317ZkQ/oQ5l6/eG9QMc1VVWonl

QcYRt3EzA0SouaFWnD1FyVgYDt137JgPa/cMXaTpkDydTqXgfEsYyNW+FdKlklGUqWJO9JuR3fI0Z/1y

z+YBXmCbiSPpPLhTwGSHyQak5ew6/v7AbZOO0yZ7/S
A2nfXKKx8aPYBng0mKhRytEyQqKgJ9B6IsxxwOHXcalFmBGQMmUWg+oeZ88q4ve3BXTgFRgpNNxlvWvM

Qexc4stXvcHnH4YGHP1AvLK8a/FmVj0YeQ1v7mXTpac54N5euUtfVzAfrUx0xxdFrgGW9+q/cAcNVuVK

qQP4rs/Tq87E3S/5fipqPF1XxCnlAOR44p+JWwiTaJ
C8yTyKAeyxcG0wa1OjkKyFSUuTpd3c29i1jW8Cp9pq58/CY+7jHBb/jFptoIfls2QFVDA0Ql4uyiUzZy

E3ozFUDU7gdOravaceO2UqPefY8PA1MooDGeZ1pCbmN1t3fFl2GsdCqYrMMZzluVN4SgM249Kullde+o

jqkdmqN+blp7IiRdYI7VPn41YfrSo+Od5XnyIoAJ1f
gIsonBFa2R44QhuVbMhXTqZEsM9gPRi8dpP2jKKFYJA+ma1SDzhZhCjT088+hBN0KnUQkUMBoKCiVkGd

pTCQuYHLmsicyeuEUXsnyndmUdxgHgNekiFhF19VuPh6XWsZ54nO6kuZYSXv4An40RDcdm/k+njOIffz

KWjN3UIPVyGggEweZpoQtbKmoOFy7nLX0nHC0snIE+
9jgApkyyXJRD82FK5aimT8pINH30/3Tk7f/T2xeElYouEihSGhZ1NpysS2AtgNulNJExKvPT3LNw7Rhc

w/lOmc01FGiyMJG0W8R4rv2QgZevb7GzI6/k3cLERUMqb3JEcL6t0SG7ojfE5tMo9t6KtYIFETizKON7

jy8yvh5a4IbT69hiNY73kFJs7yoW0rjHEksfUx4410
QPtz6dD6Txhx2GE4kdyYLNLwQglWiJVnUI+Hhtk5fUq7rXD8mGnaipspZW/EqRS0/5nvPmaJeTw+n2YO

pjQ/uNysBkGeUQIhXOnQKMoNW5X/NzWIXuguNR+4nfc5MIxs/w0b4Q1PVkDqGfd+yfb/x1VGIJKf4A3x

wHFb4t2jnFMgiEWtilWmFp3kPre582u5FJkunKV0Ao
snJaUkFj3nHQ7RyFITj5x/nqMIT0CpNeLxQ0T13CCNLKKLb94nhbShgbdUSiPQR+3Sk6y6XzOC8gLwXQ

Ec0hWzgFENj3fVjt4x5uBAhmjJwbSHii0Mtk754tec5og2oWFCEheC1Y7tEQlvnQJ/xAxkbsVniHm46K

TE5efE6Io/Gj8aIholR8SnPv3SEnmvs46MI1m1rm6D
6Tr/QD+TsarLeGn3h3qpD0yjH1nzBB0/w80OAWnf4PWuclB6RHlLZZc6NJz8ZruUtOBCBPnmWQ39IGIT

fzyrSZCy1actYSVYN8Ah5I0w70j7gkjVRNwS3kOBBUFKE9hParcmu8Vq05cuEoV+hwYTG2d8pl9xmDtr

p5v8xz6nuwv6NfMlCPxA7t6D3RYPZCxzhslQQ5FJB6
4WgTGWlfMhN9kA3k2AEm3qS95bUXCeHcT1Qg5c020LTm19oCtDSA5kdQdjA1cHMrNG2istz92r2nSMG8

IUSnz10EWro2vhDVa8eBddEriRcbZNpM3Ku/mA/Svq9ZV2pLBQLiGYk+RZs3huWdzXguFdmIJfFVKoKW

RlbQxIF22vd13/7oeCNRv9i91HvPIulB720YfD0ZMe
nL8qdhkhMh2jwy1fZaEUXLRYbw8oWUJF3bYqS8ndDx0gceIxdwg+fKFu9zJdRoEXkBUtfrsgQoNC43a2

N48ARAHpyFTbGYly0f+dsrJC94qBN58LUeEdganTFTtkFHBmwtFxCL2o7cY8MCD81oD3xJMvDqytG3R5

kDeEbOymEtDeL8yl9MZIhzIeIhH1MaJVgAfrLnVYqB
ZdDUq1F4zt8hZFvpm/954M0kv9y9ctN07dxDSO5C6AdpWTLyMZSfKb2Ryq/7Ej50OCZxeo7xLKDhXxV4

1ojxO8aEXjgiKi2HCPLziHkImlzSShsDL4f0lflxVwNpJwY9VwCHOQ6a2u31mRGkYa+FX53hPg+oQCa3

qOUbX2jFXy8qkHp1bQC4kuS3xj3DFLgnYILHTSZt/F
ztAYA+gUwNisFCs0KrmNnoEphAXXc1CSSLc80Yb5TfFpnUoAEAQmc6EpsYpbUt4M06ZuJgx6yPgGhSf5

+chmY26SvPB/PwILTdQLuzqkdRmYEeJ7isaD/9PN05nan6nwO9FiM99sgkxt5RJSkQ5Dxf7gPY98PNFs

LkAo1om+3PBYc2bptJxGifHvopROHJUl60PKEFwr4C
umapwfXuwFE94Ax3SV01b0p0WeWoCf3deFqCVXAg34Ai26axNncFD+dVwX/dAWhCkm0lYbMdOMo3rAHD

EW40s9O7Rhv8Ybw8L+SbARdsBuiEAQemjFn7bKxhV1cxqwhO/PdJDdrbua5DlzmqqrUPvpMB9y9ZhpNM

VFB8wSk1Kk16fPbcHf8oAM2/ntOM0REL/g050P1v3Z
ZVMQqLAIik8RlowX471TA6dvzVWwvxzSS3fNP8k8ya6RU3FHwJvq9r/zWeemtTp7My32MYxWf/gJJzvz

vPg4DjeyiNOsZsaw7hiup7I4n69NA2nhIlxOWtEVeH/svi7OzPeul58FMXTBHut8gkCqhupeBt7sQEBP

vrdyhtQWZxN0wLqV02tYLkFqBqHFcXoLzPHpsOzziM
K6Ove69h7whQkQh12/P2PN6neg7kMUS9N9yoVZubSgiRaLkq9YpEdb+oEVWxsu4S+QUu8u7FAi3ILh65

72mZ0zBzz+I1r5Nr3k0TyOnp0UcnEeV0hacqZSMXBapjH7ep5VZSSQVGkF3Sc/NNsznJYbyYEC/tAxbC

w/SNht358D1TWLsgKgccgJGZ2Qb1lqUUWlzm6iIftP
8GAVeaWwQ7Bj0KXkyTPnRRtqjYFAAK6dLv9JWWEqqyWLKLlvX9lSFwz885Rn90A10z62tGSdurp/KprV

FcV9dzjQpdRnZSKPv5jfqX9kIDQmoKC/Su2LuVuR9W0YTAaeApjLA6J8OnC3bBDRcyH9AtznmnSKxZnN

SvCYUXQp/Ri4e+5+r3DOzPuo9ySGvE4g6dC0ydK07c
PVbsFLytsrSuHLCvct81emid1kjt6S1SYJtuedZbYDj/qdcysiROC94vBUIm6+skmliA0EVZoala6L4S

4Nxgp51iqk6C7aZgaQP3ViRH1zzyH3qLkyomFEJrOAvBu7heWFaDiBODr0GEfGBEXFkA9+3bvOlkT+Zk

6SMeJpiO8EHr2Ep31/oNL//6NHK7n5tZRJuay2QaV4
kZip0RVqqN1eqIU+sxOl/m8gwcqPbQfcaRkjv7EGTJiLK1B6UAq9ignzws/nIkgnhzr99GTh7zqc9EvL

TniB4d95LNblmtNx5qlBhKVBn/Gn5J0CHSLb80bcHDQQTBraEezNqcU+CtQMQloW2DvWxWBPYO9CdPWe

TeW4Bdl0zpBY1o9zx762Hrle+xGuV5jlsRiFnjEo/8
Y+Z5LQQeg1l8irInLpRnHD6EEYG4QBH8yl33msUUgl18q+AgigE4h2SDcXD5I1zegLZAR5YPPhaaib5/

W04G0Zx2Dt+4UaI2/TlYawMZHBGpGHzXzMRfmn5XLBQmMeZGCWajl1pWaTP9nmSQZ+fk3XMfxURntEQs

9TqdDuTopd7Ms0YYmejkLUD6DLz+5urS+cNe2PoSfx
bcjsglj9jeNwe5oWCrtDJ6ml4r8PxfNexghsrDTKlog9Q6TdhtNsUk2miS+UE+wXAQHmYqYQStXY1kK5

5P/nxdp02VtBJlIIJ7A+onLvInWoDmeEscaJ957CouyaVx5k5M4W1afDyP9IU4GCUVi7BvTXjk+fplg6

kCR8kNR3SD3nUruyEcmd++W9F7SAsB1WNaiR1vVX7Y
UjbGlyMY7atz1JnDn60GxfgjZua6Gais2rjkJskEb0WxKWrDl3Kymlf7Pb7ONl7pksoJ/1uWU8FWjL4q

awnE8UJ0W+UVcX5Cj6RphxITPEE7oYx5DSNOD5P0VbQDRyUyDE2PXKFLG/G3cRBwtb+h/GeXBrDmWNRJ

iO0F3Lvu3W8n7QoBfQsRUYSPkjtQSi2DjA7z/Sulsp
HC8zCBp/8uUoaTUUxaNdaxRugjDtBXojuQ1gDfyERwAQMXXKPAYD3QrsmyMa1QMWvrzPFNCElbstX5Rp

Im9HlJTOGPn0y/2OakatXO82iRg34RNFMJCh0MOAZ/R/CxyMUis3LL25LgIR8acMxTX6hu9mfQ4ou6ZR

XlwnjDzl9g1KC0q+tycJJW6r/aBxSnv2Zj7skMUww2
p7BgKfxw29htIF17Fs5rPXyAsMCU1NcrUXdDlGnL/nIatG6wTdMXxLHrHHvks73Vggg08egIfvuVJfmv

eXHj2kNm4W+FC4lZ/lghG4iDyRnu0x7rUotgpZ27rjbuwigNkZewVh5P0p3smDpr3allz2HesZwFowrD

PvEkAtDW9L4p71+zhxGVU6EQlJooTWapQPY/0IeOBK
RGqVo6Tvblljcbhfw0si2JhBU8GNCNUjwLm+EVAz9xHKbBgHdCIgVfKS2lWBcx//0GlBf0vKgpvvI6L5

tvzEYmI4lyWmyHnboUqxJ17r1zjmzrK2cWoJ75z6DQNE77wSV5/GNYoZXOwLIJ2McPA2yuwtWUOOhlD+

DpDAvhkOgHKJeopaOvKbs4DWJie5yU0nQDJuI7xvSm
lIn2Uqk2GzNtHD0CjrfKEJNh7SyP68O6ZjHfFlhyHdm954pdYtr2UFBP3rNR7oaLkG7tRZXiIBN8rw3V

sYDCPqtuTj2SSsG16StCYsQByeeoc4zhc+jvDlXu2aRa722wDhzJQjjvXT+d0z9vvn6fct1yoYLFWo/W

WLUOo3h5wGi/nZbFOVp5n5HurmW+6uvP6QIB8ZuGzH
KenR4oIyTm9edDORi2PI2BrNljPjcKsJaZ/StCi6Qsf3uI4A5kuKZJwQeLhDN7eV23D2PZPJeMjNrqrP

KvLz+vlOZA+l3Ike+bhXEAdXbRYOQoXJWvh0a73Kv8AewBF2niN7FCr9DmbA07kRIWsD+vU0GLpq9jqt

u2G57aA0OvovvQvy88gz0qzvz5amCPty/xrUWd1yzL
JyKZcl43gFxpaVqWBYK3jBY8RAX50a2SlBss3i8pymqsAn+B6MNRQDnueOSM2RKDobpmJlQQLYwdBaj3

q8FS/GunHvmeWBYlkjew7+PbzTkn/AqqMcG2l0PuXlnLlJD3tkGSbOkQ3fdwyOa3rbMjr3dlh6Gwk3ff

V0pHQI28eK4Z22X6gK2Iq6cJvRSBMKG8LbQUh7QbC8
X45tEiEG2vKVtjNXM+OzZUJiSIr6l8L9EMTZQDvd3mM0iW5E+3s1e+oRcQCSrpU6Hyjc8PdxkBSd83L4

b2XgsqN8u87EogQauDKcYuMpzryoNYdZ9xtvMx/5NfvPEK/o8xRUzHnx2laC1ZB0ZoLYu6QBQ8nrGy8N

smkPThfepJcrJxHtkri3IrYwl1tfJDEaS68dz4gig9
My3tgYLuPjl12P3j4o48xFqwFoZ/+WdrIlVM6s5d8M4ay5u7Ne9xTdQBbjZccasWviZUv57OLNLDtdaU

FcUSl200P8D8cOSCXeh2ZrLEPr2iqyOWqCQh5Px7f9PgH0zmWCe0tW2ZoDC9vxuoxjss1rHnV5N0B6Ll

rsUe0CwLjZFdDsA1D67/qVlrJZ/G90s5LoR6X0qFDk
HHhUwduClPjyI1tSwjBljoUDSbEb0ENQoumQ86ZZwLYCQ9QJaOYEQz20n7W7DzwMdw2SPBD1jdGgKKjT

egC7yZTAZstpXF7/dmrd82RCsxLmzg8Npzuw8j24WrcILk4HQe1mM7RAiln4iB7vDq7OvG5mwETqnZ8k

zI3Uv7kYyYns+9tw8NUUs67WUInNKgqRrDED7anlR6
okGdyv9os08zKV2aRsTEUBjt7wMokJSBfe25BLG21dOgW/QooRt4587Sa4G9y8nqcSU0eNCDfEg+S3ju

lUig/AG0UsFgXvWUcqWHPbiuNU5Fq1G5b6IbKk+2LYSsmyTwW8IP3rw52dwy+l2Jnau6LC8cd68x5NHH

j5gkZc1BYRG89EeBK5d2/gHQr+ySxp3QBHdMg7eNbP
1dEh6muflKEjZXaNkpKJk8ZH3KhQHRpZJJnTjJNsiqR8D+04mwn5NiyIl50eutpIR++vI/nKyyvn11iO

g+PmOd2wjDnjmguP7p3tPgJAnRsvuz4uGSeG7H0pN82oHcAy0clYURKKbrS0MnBawehQvf4+XeBwDNLb

yz2F9zXrubB/Hl9K5x1s7JAtgpMi8Sw34UcoH3G8qH
pp+faG/3PZUwhkoCFz0LRlFVr0Qjs31OboFGevhUBfLq/ny19JtMK6E3wOXWYdLiuFQxF3BLrKhlsude

1jQYbM/86/aeamU/Sauco1BXvsYM35EXZtf21nWiZg+qummZ5PQcdVxH80a6sKkK8ByHqlQ43kxoHapi

dre6I41Tif9sYiyG/86CGg5OdoDC/e+ML2e9HrsnSq
zZnOZuGTtOHrGhV2MuGopmv7in2hE5C5D8H+aYATpvOXicRrIwP71CZxR3xA6jI5OWLSSEdTnefQlp4k

kOOUI4IEx65DJY+W8SaOI+kwv0ZX9Y1EAXQuO6nXu1KC28nvfn/c/SleNcfcf+3RCF3TPaamw14HdXMy

/fMJc94KLnznnmlbjsb4UsSEALeHjzacrymtYt+x6H
tAWvtC2kXmmA0l4YXmPbjzHZf+K2THU+RIlGws6Ta2bWBEvUnX1YRIFgQytSzKXxvHMVJaVIT8IcwtYs

LNUZaNdOZ15CypThKE+KVoQa95snlAmkoPvs2o5s05zyweL5Zh8pZ+uWtXyOSo79lb/OS1Cc51PTt/Tr

zWHVl0dYs7v5VqNMrL8+6H9sBjHtpgHE5BFjkErwmd
tGypp/vvgqxp0BMlATj0uZeUeU8Cww/IKIh1Ydoz4/PlCr6tGlbrbEfIXvMVFhwO1aBuBo5Kr6I7sEIv

470ufdj2dmj4TM1HkZMJEO4AQ4F6kSjT5RsBnm1SyjWPWIp1rW8I7XNkeTxrHquNAtY154Fn9nz1p16x

IwSDC0zNAj70b3i3weZpo+u1Uc1jy3sioilFUTVbjG
Zberi19E74DGbTWpgiQB1KRbCKNlLp1YgvTZCcxlWY2yTmVoLzf9yPCK5TZH65fOxyfLMeAhi2f5n9Ti

yHsOc/SSveP2xdNPjk8J8vRQGEf8jyACdL8E60+NbO+LBL8CHQ3+5WpvewjtVh046/Y0tQg54BTyjrUa

2lln0wsB3HiI444tYHXQ48y3SwYg7m3xbTfgJuR+nA
LSNYtxcFWeyXfH8hhoTvS7UqDAjDjtoS2YqFZpDCgC/d33abVKccHY17Bz2ae8iX+9jC2V/oOU759ECn

+nd3lWwkraTxZozerRZorGeOB8v+bKxq9T24kqIfpoaQvy+ui/87atKdrnM+NiVse3JGD77Jv2zzSQ/L

kEp3/P2D2zNHCg6Ga32qsPqRcp6Spd0IZlC5a+SVJB
d+kVRefez3aqNyhJOw6DPGYcO1ToxQ2ubtei8LR2WHJdUE+G2H6tg8QstemOdb3QjO0fXgF1eYfx4bke

t1ksZz0rvwJQ2u5vXsncLS2+4z2Gh5TLG0GTF3E574EL8zYjHWzvmKq6QxtqyGE4yoh/k68vv/Q6IV5y

uvzxpp6aav6VVgArAZx+TLdp2D6uwA65TUvuWsenWx
NgUd6IxkniASBVl0wEcKct7bP2AHc7Vo1OtifgpD/1Mgb2hXFln4sp7xafcIXk18MuT0IR6fxwAwijj3

1Zd7VLi5QFm4bbVBqCsFOBoxoU5NISj+sy2bqa1CiwRyjkalxEpk7eb5Iq+EbFL9HFobsAVFigxN98fj

ezycWXMvU7nZUKiv/Nrp4UJgRglrTPZg+rH0GkJpAQ
Rf1zvHemOvjGuz44ZoP9FHcIxaP8dcCE+p74kFbYJVy21uVKXwZCn6jrG6Yj6bSB2pdlobbXlq+7Xfb8

YGfB0TtchQ9jMq5XTSz6RURUDRZh1Foe2FWLNOzpvnpf5s8UTIMz4LDg5fMLaCU7HgGoRZuXVzQmFxcy

GAUVBJfKzMhOoOeT8o6pe0sEfgjcbezcsqO50C489f
h8n7NJx9KTRdfBSM8UQbJPAoZ5eVy+y2G+Bdlk67xoQprFoNpXk9eH5Wh/H9NJFrDgmzml35b+Zs/qpL

8uHgd3/lO1dj7r4tt+Qo7Wlw6E3v7PqCXqVq/5hXl62ar1BhVIdY5EGk35PqVIxeBwGQwTlz7PzYrR1F

7xHTYWalOr7XGOo8DCM0sz5Fv5phMYXqJBm29G+YnG
4vJnhJol71ORFqr3pQrmJWHMDCJBomlexYP326238V4t9gKSm5zM3UQBHnifx1pW5KENAhXDfkcSTmWX

qVgm3G46KO3+WHImIfcq3uWOBdMIP9cCN6KbZbEzEhnJoIwOgZf8Y8erks3k+aT0wfZ8sL/urvzraKnZ

tDSVTSFDG/NV4ynn9bhNeeamL1n75FMs+dfSWBYClF
t7Q59KX6/G7bTIdsfWOSdUkmsQvJ+m205iibIOrjaQ7V7ZLXqLXx4rlYJL2NUZo4iaZBRikfcwgY4CDJ

erfTQ4pdhZSWT8jAzHpg+lI8fjL+xpTufkqiln1YUBm62YhGV1Anc5wShwIle71LzeGEN1qdgivXLP9W

42jpI1dRbAEEXKr7RRIWyolzN9puvYXbjbDG8QXbrB
bfJiO9YqiysHlYGnF65X91bpkb2n0qXDsB2GDBlgF4H32LSL652NWU8fFiQN1S9o7Ilq5/L8ZtXvuqwJ

jfoc4mfzlYU1hJbBHRh8ob+6Khgkrt+mUvrPw9cJysh53DBtenKoO/jIWM24TJMQAC11TZjMai4IL5Oj

/Zb1GpSR6n2K0/ruDZzRhuGPZg4Z0ypk2diEIO1xXp
FnRpVP6rsNCE0ZQvuPInujTBBF7lDzgT+cFRfzfMWPjHSdLfBrvPGesxl5IrP5V/23DVzvv11wF3thA4

EmrTzTaug0O+1HT9vbD9ErQrXkJDlPcUvO/BnIoF4+CYrTFVBBF91CpcxFf2RFoIr0Y5j7U4vMa64ZDn

zShosb2D/j0uukzyPEgheT9btbWfDkmnNevFmr8yn+
alJTv+b9moKcUPMnlnnZ7IFjn9KVD6BQeeGeRehTY9q1/CwTFLlr9QjverZy4H8WtPACkz4jxvxsKjJb

jMP/H1Xycy8NL3NqTENtO3FdB32lPB1pHKUpih9YXvTrjRVA3x8YsUiJTIEZ8FGhb9/uMjeZPhbMDhqd

E+yjvfxhMVz1dmnLTWcEGlPSWNvKQPqCyKCMS0F5sv
xOuF+NoCsSbcFQ6r1tfeYdrRkQBWudIAjICVp4qgKAh5ru9OVSN5FFG68Djz1ZSgAXt3pLzPXoHhXeM0

cjmnumbEVwZ/XH9TT9h5S7PggLNL/ISU+wnopujvfN8QS/xq8kgOwIlUILGj/JKXuIUSZZ3uL82Ro1Fk

gpzCZfDEiwJijgNeDlLKZRysifwTBKxWtmsUFWpjCI
NIi55lDIit5Hsj1TrhjLjYjWEfeZdpB5Kvzhxdb9ayH8Vgl6RuZQhcpkNuCGHFIGghbruKafYx0P9XlN

iz1mX4V2w/e7ffpP/uLbPFC5S70YaPUca/DiJYmJ3sKhnYzdaLRCqbwuy1VLb0wabvFc0XdUw82vFP79

RPGBVc181n/uRMH0zEUR4li/V4zcHzBVhDlJPc7i/m
grL5hXC/XizUf83d14Pv1M95z3G9Chb9fzr3L4g2yXfuihgnVlxUmeLtpO72tTpKlS+HKaMLYgoovaoa

pj5csZB5Z6pbTdUkHlYE5kcGKBXcr02I8FXYA3yfJqlduYjUmIdxQyinI1O5ob5aqs8t51Zmcg6DwaMp

w57R45vmOs3YPG2s4PMgoBhnqVsIYydIpf+sR84ibN
qDGESiXuZLWuclgD5qX2RGb71Ss4Iush9HKDBfZIS7eaL/MhXCf18Zsxqt45saWH9oq6aHo+6FBmlvfM

fgUNAbyS1ok1VXe8Tyy+AgkebfGIEOK+nMNMNVppAtV634UY387qzLGluRiq/przu7/19CHeYADJwF+Y

sTLAbgyx/SK2XAh47rXEwUrxh5uDuo7hPHP0TWOw2z
FYWnCRaEw91lIs6/RZgxdnBMx9Hqcxzot+x5MbNj4gosoGenpOGH+IWYphKHc5ePBczpOkBfsJ0pmGO+

mXPnBKOHlpsMuS4TGT+f084BBRZXLGx1RwKImIB4PUWCGFZQd8yECJNdE0ajLY+vi1Uo4XZF7MWuMhIH

zcYwGNSzdB1AjzVyXK/7gwn+Lya8b9VvqDkCGUIiB+
7XPAWztugQB8gu3nBdBYJ3O6acT3HfeJtF0QE3Mb9CYWMUYyEMm++zL9c/GVt38t04JN4VfE9243rt1c

IMFq9TimaIPsLra2UmxXtKMHx8qsSRBss1LgW5LizW/o4hIZ74GsBuKkGAeX3sgNs66yysTC7wVD6Dzp

v9YvfdBRBB8ZXL7JoVrJLiWm6KJ6AfuGG0uLi18kQ6
hiBYCkna+wBVRaREMtwL3bi9I8VEqYkL7e3V6oaBdPo/5wHekoo7yiJjZoYw09B3Bmz7WXODA2lOodRp

lkIDV1N+KgiZj8ErZ+CpQrNscEDFzSK/4zPrwlC3L7CBJ339oO3LUn1WD3yEiSS9Vghcl2ykuRACXgJj

3raFHOf9+Jy626g9i94Di1ZOm+BU7K1o99MbyWc4gh
SC0x+aKE1O/fR785Mb8f9h/gbrmwa0UaishSINhHR9DeewBvNidCc9eg+khYqobDjC/xp3QTCE7S3Zcl

HlDIUWwWQu6NRDXY+jieMG6lDk9/5oVRzqifDqLZFlrocrlc5aqF77P874QCJkb///TymqSykuM4Jw7N

kqM/vbSts61qIOPB21xHx2ks53qHTsf9F1L5YhbQ03
uQdD8vLsha7jtbIYu5ZEZRvltU9jJY2YgJM6MDYLwb3TlMLuCXNlVxVElTuGgF0ERK+Yfb+wADPMRdBC

gcVNNIPT5J3PC3q01r9lb1rPqws2ZGiBioHwnx3z/mBQ+w/iUdk6CZS60AxdXG6kkPpjn8BzBLAzDngJ

Yd0CyzGfdxxrm6349QYtwVo6iXam5Q6Ja+XbL/R1b0
42vZ2FxZ/UcCZrAUwx95wL1b8jxY35jxw1+XlMNyeOrfv4ZjZxn2m1xCVEt9SAu1Hk+xvwx58zaBhOQy

DAcAyD1gie6DHUWGW4DP56qAdn/+F10rCTMvAaFf8hU1lCdX0yU5YegXe9g9dhMiJ+LGzLIFH+HmWIRU

FYRz1pKngMJPWwfeQi0K9/SVqFkIINd/fCuEpLnLLD
KjC5GyW8mwBVadgFIJAd+bLsXkwi7h/m4nQnX4MR3jojLfrtYeDAKGvLMDH1As2dG8oM13k6pToWAzNw

BWY/+tdmQ6OUs+uQhRGwx4TBgaWhIahQNrQgFJj4+FTnP2HJSQgYsewYRYD3bx38dyci6wsBn7Spz4uD

iOalxfBhyqkmycjvNpz6DFnI1RGlB9cHWlPQQkuwcv
vG5vPeBnU6hNmKo2QmkD+WjS+OmuHGZYHyTv8KpYxnSwvgkaVtU3sDm/Zk8/fzPPJWDPGuC3Lx3digld

Vf0Tr3v1MVvrxGtAx3pTZMgL7AlIOS/wDNhd/C2hP39Pj3nyyC0lArAK+BfHvOjLKTgxiGY26AjjviXo

li+/5pwxxz3JMjmKwGv8/wQi1V51dhUxFY4t3ilekI
oQ4DFIqdB5RiEd9PPzBl4xqFdCZwlN1HUdU0kzBgrsaUCKw1O/nJ7doB1j/Vkcn5uo/qPqt6aETGpFBu

eYODxTg8ttVGSSr0Fte/kk1hdq0Yk2LdpYjnYbE6KKcfsPzHDHzU8MhMHJ+qMvsQwGc2KELXWvVrD1r1

06rvmjJJEzYYj8E8zKxK6A2M/A43CUrwjoswmClVMD
Pwc/Pd7+ZPjFHQAHUqzw4FGFrHFDXWh8tmdkRSPCZoB5iARvFRV1wf/J19QI2cB+EFDi2MLMKQv4NjHF

zC2Bg7litau60Kzw+tYTb8bnrDwQKvqPht415R5Dbh+bSZ4vueGgadky4ty7fOlD23JI4fdQj/tJ5iAx

UJ88O15kYKzzaZlvaVx4E68fK7MFD/UrWdNSppSLJ6
86we5qey3l+m6wh9jy7M68YcCQ7IYJIyLjXKqmTitAd55f/iC9pv2Jb0RIpvUXWg7ySSBxIQG4Q7k3Yl

Ee72rFCR8XKagHZrLfFnNEUvOH3OFZYky0nV8yeeqBbwT2KZCiGPyQxWMmTDQT3xSXv7DfnRaeNqaAuD

cyWgQGJErcvSGUECLuIf+N4QuooetY077yohn7JomU
0pUStIzuFvfJQRt/fdAAHouhoHybMi0XvznxzlnRr89HOAf22DbMnwJMYMxUXLAZNN0SrkGQ7JUXugDc

1pfoAkGPLesbHKfK6z2aqpsnYzibAQwuc04kmtP8I30ia+kwm3twdn31Ep83j0571g07b2f0TsBm0J0/

NyC1suHjJGo3vKo7G3YrzVSZxhZ5Q3yUlGAAy0Zohj
QQ23UduizHtLSi26VwssyTyYTaML3HCrppNcl9NhnmLgVRl3W9qXJyqs4Km1mmlSVUJs5ChT+OLvu46M

J7O8GZsa4mhYQbjQV3954gZ14CQ+QHcOYhK4VmyCtVV3nASciF+0qJjbfQk/dH/2ak4XYPEVztFSSs0h

93Na1LF41keO7QF78uYmJiajs5lFXy46xfUI789q7z
h6uTtDGGomZl4LYJbWvCuhK8yR+vNcE5p9UHPFDwOgqA47OM3bsZqetQffN7YoZLEy8lgYS0rdAIulvy

+gu1Fc389/9u//ORzteffpE8nhb6tRT7SteZggZmlcnOO0Zk14le6NCtwJwrHEuS+hZ5nm4birA050OM

oHIwmD0MK1Lkh4f4YnXJS6+AVyCvEDmqrxcSQdvbc5
LAr8178KJj//folRnRxitBLE5NZKAe5GZywqIwPBOo3mqnNHy3i3P3mQXQGcIXegNFIks/1P7HtaybPo

BtJ7dejFmd8GL8I/L34qdF1J7harBt6kTPdFb3Qd6TIiYtOvF6B2Mt+txZXmg5HCh8W8nZDL/ljXziTx

kyrxKToZvO0YKbdyvbiiqLdaLTuNX5W4Xw26lo9FJ1
d5/Ms1Q1Vf6y3Wo2FdUjVgKHnlAtokBVNWn9RROjgyxCtTmC/EXjqiTdKYNqgRP3AvX5oClntaUjtCZN

jGOJMPA/Dxn7p73ygvOTHpJAmkN9I/fZZvcFDwCpgA/bvUsMuNbc61E6SAUHifVEIZecHuvScxs90ItZ

BASCfDrt/6OXhkSlvcXIk5cKvgdBfYWT6jRvcGzWkh
lNY8iFdzkAkn4G7Q3PeXVu0c4YEmbGLaEanpspOiG/Lvv7e9VyKuMrwyLNyVFucWLrgrJZlqQGI/juTc

2QCT0rIH+/7tUeZq8y1hH/rH/IX4rXLQAV/X6DO7UW/wyr+j/DDLA6kqSbEH/72BgRPeUK1KwnC7541c

SKR104JwmchxBNA22L4dKko43pMMwxar4Njh+ODBj/
9cRdKyi9uYNu1LTh8d/ByhPrEp5P0whplnn1VFHsqYlg30XJ8iAP5ACx9qHaawcWFNIQ+527/X+5f7l/

uX+5f7l/uX+5f7n/e51op2quBjyuaCXaDHx50HfntfZSwcYM3Qt4eKxc/V+D5RIeylV79Bmnh4Y5jno9

LOg/T6ZNfHDHgJ7oWl+oOxmcksi/mPBH8YRc4pQb0Q
PeUtLZPaNV0T1OJQc4vSFfr/zyyLwl+Ve1N97IYmS/LtvfTD3ziJnss3FB4iqM9hB3EEdwkKUdjh7z1J

mzHq0/WLjpbwNh9KhuEExlCFHVW3ta9D2I8pHlupza4fk4kPKSp2+KCkH3kUzBJhju/fF8Lg0vLVONLz

gZ3dgKundzSYMe7CtLtV2YOBPOtye2S8Unbm0gIYp+
xchGLtuBonglZS604+cW7HQAXyejxCfwFgFyt6VyvyOji0+XGUOGgLdjXM60R1ZHjQ5AWqQY8X93QE03

cUPMl6OyEw0HfhYQT6sECel1EqM8uv0Bwl4xePsjuI09KvH8xy3iSp0mBb2PA2U3sVrALHwLVDDk29Co

xoKnqzTIju7y1Z/TmqxAtfSGmMQLWVC0WMbK3ThHKP
1rvw/OgSQ0DYpyhkqlwcLaN7ZVI/X37WmbXq1WxCmbEhq6kKeDzgK/3aeMjc4Rs+FirWFuwhjiptS8G2

ZzSuN5lfQPPRRurfYtetp3jDssiVo4GPru+4fwfKTTKVDX6Bale69Hc/1LBthwXUDUd8L3DH8Wr9g8Ts

Cna6hcqT16Ek/66jHNjz/Fy9OtoY+XSQFmDye1GWMr
4qgxb9QoEtRGvoFHxDdCuaYOJEE72td0WlzZncZCtkQHaDBauqvr9ZwQpj9zX+g8zq1aRhn0MEKAgzeU

S1tRDpluEPUcdu8XBd0mMQn4swVmrn6AjsbSBLwVwEFLXzZmuMjIUzkBjneIbEUtObuVuz8uBkuGUYcy

TKBni59UaerBGObksbbO2jbRc4Xxt2ZNMA/yA6C9J/
sgb7iEZorRspjFN1BCraur7gUZfMBMWYivuOjxKT9Cjpbt0w1/ZCemd1XYzgEcWFIGOS81J4H+67TW6I

2t0N0kyppZUAcRdC9PkaeOumnpZ1yX+6pa16pBnpGEwDMA60T2wUzS4vS2LQvniEhBUMAwYtwKyqEmUy

TI1MhnWXgojE4S8J7mG6SYbPcAEQ7h1v8gd8pWXP0e
YJbKxGr7E2HDnJBx3faoHthiyVb5afzy/cU4oyIggJ/+PVvF/mU/Z/2tZskaUeFEElXzQocsN6b0ABAd

YTq3Q/Mz0JharT+NkVCga5MAnEch4C/tL1NxBnBcdAB0i1GiZXz18qT0f+2pAGheD9u6MYO5/M6KfwTD

RIBd2ghf0oyMxeQD39Qzxsgtwspwy7vSwh/Auamtjc
oODoMO6ohzLpISMps7bgZkgn9xhPzeFZSGZLD8cnAHKMFM6kXgmoG6nVdXiL21JAWqlJcX/fPeahZJzn

dmdKvVxpMjaMfrW17rjm4WJTo7lQZI0lydirBE4KEoVIC+BEQCJSZADEC7XNQmWs7vw2KYbfRvZhEEAL

ob9VDFGLreRU3eNpDZnIvJ7W7eVgYo3jhH51CQbP62
C78Bdh3QpVfrI1hjnSF5oDWLWWjxg9LHK91fOOzxyPdBNnCRtCVZhTgrp3BOXUE8YC83mx7mt8wIqC3B

4RUGcldQXvdMSMQdro0GoCHRa/17z8j+tadI7RmNN+HNi29O2d8rQ1JJIYUuM5m/Wdd9wLc2rpKjyB/s

ZL0u8gr86z0kwD9bv1ESAGyDghaJ2FfmZnv9Crwhzp
sjuVD9g6LfpKIsay+cHsXUn7ahSRDmVPruKNr4jszSZKkQGobxkXD1twP8QRjwrXTZVOYFm+D6akisfx

3kKUgYYVlQ9sXzPZjAXw/2nTgqwU4WSXzGprGyJgqD5K0Az5+XOl+Abl1bjcEtyCuTfM9NKFygd4YcnF

5tu+BaDJBVJunZIT6pbyoyNkK90gWTnul15y9JqosA
wUaxyvWTmBWq4LUSfVQ7Rjj/dlNG8meePDCrt5MDTFKFIl8tNyCcjJuAjQ49Fq0lku3sXZsmrG/MX9Lb

FOy6TxmRYAyuPi1ppZ3tp8DwkqrA1sOgqjXS4bCtaklZ5CsQSSlzliT010QKckXMJooDAJ5i0ceVHmnL

0yTswk4vFzsGVrJpvnvN+6ELLnqeuya7S6bRMAoRAr
+xM4ZtLdYvehfxjkFoqBFy7+6xVIbVYhM7un+I5PyRiNdOJHfOFUpfBQAvUYAmmqqtInWEC1+8U6S+KA

ghaNSxsN9Lj/bSc3ontmb9xhhr8FqN5aqxNBSSDDbz/qWVh7NLWHudvfVjGZHQarU54urICD0kenMYN2

NPPwAeQfoqaxx+K4qVgimwp277RAVrsjt5+6cVQi/m
at/FnG91E8WSOkbb2XtC/AeJbDLJjIsMyvgS1DsgLgyraNZpdN3HFEz8ZHxM/mIALGLXP2mL1Xxse7wb

JdTmcnrgdlPAVdavktXmMFBMpbIwrPfDJZEVW0jWIv/t6vwVl3xkWJUlHb/Cc3etJU9ngGnBiLketip7

K1hpJx/Wj4pb2CHUqQl6Si0W2iUK/Zs6lOJFxVYMwH
2MKR8o1z1Fn3TF9iUVYTHmrlgIIdpKVH+5jA9QlU36puoBbep7f4CYosltLe0Orzjk4H5KKL3aeJJh8q

Gm3ctZPx0Msy58HIj1CvIuCMreGeNwdLx94A5EMIShB6bFIOS6Iw/BNbZAu7dSRMCIsxrYP2jEep6KFe

IZQ6HtXsxARISm1rhF91EhzEOXjx4iACy3vpXel455
wvYHVW1FDk6Vj6pgvjq+ZBHEv3kqIV7a4Ih7cu+WEUS6BYlhWjHrueJXkZ+ZJnk7/niRWf+waCojf4QL

lX0QyKQTz4bn1jkzHH65GIUNWAWjRDvGBeLHMyYsBpTCS+ilTtkzdgCY63k/9v1O14RX64EBUWZ8tvbP

AypVrUDhXyPlPnEZJ83FxkRKjBrUBIISjGcorq+bqr
uHRgQM8I9/fALI4hHEA8pA72IURpTLzf3PYQU6V+nnMWbaF35kDN4qWJvFG2W+BnWBNwkZOLzMoqWqez

3k8+Q/DDzbqVvR667tos/CeQheNXrcY154gCMbgE8scqxuMcazgc4O1bsmZk8W3vwmqWWqk+nqFaRKZe

csroaF3tpYrbJT1zAQvjvladvab4HdJEu50/usxcOG
XGEQIPgEQ4Wzb+evjcbDo1OCBrPceHDYqOAw1n2ug+oxFpgS1+Jkv7ayGAtfvqPYAQ2oG4Z2lTptO/37

qPY2NUShU6t3gIxcgX/GiVawGfb7/niXliEPvebMnDSsjwGNWCx7J6baKG6pqOEQg8NuACN37qFxCFGQ

ze/XNVHQZ2TBc69C7IhbraBWHQS9gG/9+jWg748p+A
vAYkyCL/4LSnDUFXZ5JVDIJ7A++wOxtSmu2ywYcdGH4OLv7+iJu6YiXvFiMZFWonR0u+XsLVm+lr4JGv

jr/pYTwGf4ok/Gmtsb1wS/hRmAGRsjY8BUT4o2g0IFDWOYy2uRg6/bS9VDCKDCdFVKjZqqdsuvrm++tG

P6tUZCPAVspdDJkVp7KXXtO9wU7L0NDw/9Fo9GRFx5
D1BMcqZjYX3Nyr4nKC9SgtrM89EYXBYiywIPZ3b0ZWBUU+LEUyKRqkiBecVK3CZofhZDp8qlh2i4vWgp

AIddLdZa4FL57VjN0AwmNWfe4Q1Kv05oOcdR/38cIZOCt7qRIGkb3jududihY71+viKSbUxeBbrQJdhx

ciwGlfjHy5bmpx5FgwDJyriNkV3CXkk4VJ8HcGjfvn
vyZrYn3rvX8cKgFbwC8yDvWBgfOpmW4WiTsBUboX0yMJFm8kIS/NZ2QmwLx7qEROHmh3nPaAiulEorfo

pDoGTeFg//iSVGlQv2ro0JBmlbrIWdCfuaWEc0ZJL7dskqSnjblHa3DO+Zh2PCYFp7uWh3PBeH4DJPQU

dfn2l2kmzdXtB9I0+7JUPDdHrHbUpF2TqtxXL/cLgo
UpfRAUSEdSPBQPXtzn4PU7iyd63rYei9yu6/dvOBfei8iR/U95mc1TxzB1Hcy2lr+eawljl1uw8hk6GT

CJvL1UMh7HHHnEOfZ5737hxz22HtSSLVZb6RijY7zDdxGOi8XIV+IjafdQfRP/sIUZ+x9BkrxtkprkVI

rxZ0AX6TPUBYgVPjebs+ghKmw/qO9r/1YPLif5cvqN
zztZNW2vYr2JJrMWFeEJGOsb+52qoxy4uNZm5FhSTai/uMRPN4t9mbE6toanT+UHH8/EXpblfYwH06gE

MMgfoFihCh0RmgLImsFOo14pU1yKasJymsNmqcR+ypXNf289b1dxiYrsYANk4OMR7pUmHx32SqXyOsNa

HZLWjNd4C0KDPrlP5Piitp6IU0p32YDMHOvlXC+vBa
INk4OqjRODvfGypqeSlmFedfKWO9etJx/VYf3claN4H5uIO+05xjwK/jEdiBx5hrpfhqYd3tv4dT3G+t

GZ+3pExTMua5k034nxKFjMievB68QopgRw4STaemo/VfSlUks5UfUnXKAnQvRzyqfvNvVCkO8GmtEcnm

sDu/74sQL7LZ6Aa+wQgSPfaxTbQ5z7NGaYrOCR+ovr
wyfSewRc/LT4Wl2qjgBt4AlrediczvT62YZdokf74PFURH3Uv5oZZlUJeFudjx/+ozZnJXnl6Mg1/LGT

AA+7LVlt8oMTE+7nB9MN/msnqMUXqQcycKdq693bPEzSqQfIzN41R4mVoEDr4DK+6+F3/i0yQLPmLqW7

MBehc01tunUcB4qIojczK/1xsFmS2cWqw3D4j5YBR+
vuOjlkj7KsMLU9AQyBpCQIkRnBNBxLFitxvOg4VDQISwWyTEPOUDDTwFIay3g57q8kTW3YSX3ZM9vc7Q

NMVbDbo4QZG2cwt69++Li7htz/vbY8DJyQKBTVF1TaC3LDV/aDcGz4ASABjBj0nOrpSvvYmw9OjMzpfx

E8renH5c5L5WTg4I0NUiJQHqFIDeSgZ0hanqZvFDfm
ql8XWG1RDVCGbBi91UXIZmR/x9PUcB5+kcTcBnweXxuhHxhU4o6dyOVgq3G1wR6re+3ZXXrt2TiuEQRX

8SOY+bpbG4IWoASubgckFUkEfD8j2Jp1gSm51HPl4+6YD0LgLrOWJT5x8zrFeCmsPAR3l9VfGLvaGzM3

960UtYdoBN5hSBsmOXVEPgx3ZfDytx3dGqjnHyEjlY
4PH8pn6UhxxFyZ7xKj1Kf8D7Ly8yqiU7jcnfU+IkDGoB9Jo8cbXNb/X9qM/xlow6NXTjK241k1s4odNv

p1JRSikfmT/gpkkKTpjTzlgEuXGW03efkKuaxjjB88gVrvcY+bsSqwNOIeHBEmW4pM7mCJlpdtYlnfkm

qS8S8Y1WHEcXwU5eTeLH1WwjRQ622S+nYKJ8MAtizE
Ft+iXKqmlpj2uhluMiqOXjUJAV1INutXjFQjK6R5qs1cZgfwngXqj2nV6xJnQbhVs/x7RF+UKrbAGwrD

5V1AzDzaRSq2GyBVfML6mrZGTX/9gjocA/csbDNm8M1VaW4pcBeNthRGtA3eP9tOquqXtDbhJxklB6Yh

/LNfjZnnKWnIw2hMfN575Qpxte7MpjmACkx8VmpvGx
erBhcIGhgC5VkkBpayWqkqEb05nIkugbPXaSnDV50GmiSNmeHRBQF2GrB3gKFIkpl7iHWFX8c7kwk2tq

OSun/kxflPagcp7GtVnRNtbaSg1mecqvTUUKNt7Ivz3OpwpPryKQwp9LpdoFAPTPjqW7XZM7q6YRZrnd

HR2CCrupc11YXhm+w2M0g4x3Xpo9wc0xUm4gyHUnze
V6MjjmoCwVxRr4dSI9tyiWeYB6+VxJ/FLAH2IfLH1zRSnHPOGm7p3k8LaRFwXtnky6Q5zjEhFhQ+C4Om

DiiUtgl5JdX3AwTL1AlqjHpAxu7ORxP0BpQrBvtgMUP7X5btFHXTzVhD233tpTJqrRfGRM/9uvqQ3Nbd

2HShlKjNx6kaut1a37BrDi405XktC8Hw26PCsObt0x
3STMzOM6AbZk4xZYLU175jKxDYt69K1bIunmqepAZjrX2rIUQURQYQq+QPBIn1eUnD1KDJO/G3kfviPd

4hlHtjJSk1qAJk61gUB1H0ZeIF3Ku9yPkKUO2+GPEMFmdqAAkNQ0mdZFWkJP/N8DlYqoVcReqK3YoMrK

zhcfEid+2JO6K9J6DElFHCzn68cVjItKxspxQA4uaV
0E4n11C144e9C338lkH2bLyvIZzi11JCejbImSDSYUMtZ+iJegCjIuhjB8xCMwy7kkSZ6Re5Er5yhChG

W9GmJwcWszDEKhvBZ/nrUWCzQAHMhqwuLQCdxgcKWzQcs1LT1NK/Q0QmaekgXEFrNRKkRXFQbrnAKl1s

S+nBzN33XUb3E91n1FjQP/T7SJ0kPIV1RlpHiVqUs0
uuHU50oainqlkDYYc2wtkfxbXGQBAhMO8hwOaF/ksz9fh+D4HIWqo6d+vuDSCq78ExCvk3QMerdhSn1J

iPFt3XFAyAMa4D8zRHZtKfSAZJzHn4YD+ojrY1ANrpz6MEpoj9r1Dt//GbMtiuhhJNAzXlq/k1TgxyJ/

O1BxdB5QGu57z41uEGwllHrCJszznr82PkdmccB8OY
hdN01s4toDKByU5jBoxhC5hA7psADhddq375GxaWLStDBC9SMTE8T9UF3FoKNitNXGFEi+9YOrjvUUez

8kvWJcRmPqH9Bar1NSCip7Qj+HjSN6JxD42S473M27lKV6ZFzAAbZYvKku6rtby05dQygFB7PFbB/LDH

8SNioJKhyrT2UYXrMjXLCpJg90PqKoSuEMR4OQbE5H
dPuRV4yBLSt9SR2A/TaULjBj6anYr1tS8sxVMdqoibFyMnCmOksprkkE+hDaNdNgChE2F2KwpjP74eRH

69/+OrQKFBmjrNAkiycxNgEfZLb/dKJ95lEmyJ78ThrJ9stGaM1oOgpeJoQWVmsW+qaG+GHeeeaLQ0t/

EpGt1vMXZrkQ03dS5x0oCEBKlNXxuZd0an5W08GOdV
jfFfSkVSp5rOqLFPKL/VxksLsaDg0bSsd53eZKwG8iPY6AwVldhmDbP5uH7EUK8qipQdeDjsiXLKxCol

whENbXpuI2vVrNObzcd8jnvwxvPt/9+vEstQsNhDYgQI17XscW6I58VNrf7EvDEJUzeDjwFgm/QpJxP4

B+Gq1Q5DsiDysq1iGHLMcd0Dp7aRNTD4qWHoIwrY/j
braBOW5WPwx7hKkN2pOapKHUbJqd2PggdTYT7rR+LNv/DkmkCi6zbAA2wqJ4JKRIKH+Kvy6HNns6tNtH

ft2DH2g0mc5DmnGROaHgBJM5EbRNVUb4FVO5Uc216IvnmivRJWF7rzNHSdh4YFJVGeQAa+xmfCInSkL/

p04JO+rTfA2t+yeXt/7tpyoxJ29YKGxhL1S3aH+9qx
Og6ZZ+kLgVcFDqpoM9Lo/m4kUCqFCGdpxwLrCsFYdd2oUHgc0vkXlwSpepoH8BOdvIPCls+RGBSqDLvC

52i+XxHzcqHSMqMJTmJVUM8MSWzywd9KTCCOY7eY2I5H15J79A2usSmUloTk7rrOh+Mve8lhjbozVS4u

zULEfYe6m8wstclxV2OHGNGqiRhOm5oWeOUoFIN051
OQ+vlGWuSe+yhYND2YOs960LNxvDhIloIOF5+amxwcb52a7OlQHpMBJBZqnCoDNgtYbEBBfYeMwmiO1N

WawoEpBNGi8Npyfe076BRomHWCzZK2TWA5d+gSpwejguyiFqoaC4a9fqjA0uFXLgxt1wert/CNnXt5ZZ

6Bq8zL+Qjb8iJATlrmtI0G8kGw0ghn58XjaNeIi9sB
5hEGTgzFwARhqeFWUGCvlfCLi22zvrg1ouNQEnwqFWk0YgVWkGKKSkFhVFO93ck+YiKD56NVWiB50N/T

IQgdjAOtMmo5O7eRtyRv8+ZfNRukQrMR0Q/VGWl1gCnHIqmtW8Hxv0Ey2SVKVWkUD+04o72+lt+glsD4

PUbIbBLGoR4Rn11Ap0+7wSM013+PZX45EgOqJgt1TG
XRYR0jIGrWn2PkpXBPQq41kWA2i6ClrRgI5yyL5wo0bsKKTQVr7cnXnGPDcd6/d+aYXRviFHOawplYor

rnhDaeWlXj7At1VUz/s00PGWPMwbgcFg86vf+2GA6tVKkBzkiS0+On+1zwTnHswZrKKrsXEcK+cEGpO9

xboJuP+gWGYANjoFEAlx7m5w1sEXnnoPvcF5JAapN4
IDO1LFKiVAA+Wo7U6WdzT6t92k+tSOa9q/1DiHZHqLKPiSUjOjngkgIZAVzc2YgYYqFqbP5nimjMsP1v

cFFu2DYe60wsLzkBXGWnViVmHToIe2ZW4P6maMSnanAgHZBse1TMgnRtNGXVy79qwln7jFLJN8r1D7kJ

cW+CaXmiiUBl5zn7P1o9cp9sX9VUF/RYrghcBYGW+t
XmOxOi5sP82db4ZdRjyM4H5Oi39qnDnqd/ECjIQKYtttNBZ+UThpU80OuZZ6eq24sE9JXwZoSIu6f/zl

MpCNZ9Hm66t0JeVLqTt32jUqgaMXTG5hML4vXt2F7yRMixOS7pDsT+Liu4NxrfhK7WDudht1wAV62alG

cBhThSBBCz9nZac6nMreywLhprP7rG2P3C6I1SBTsg
2+iP5G9lxYnkCGPRYG0wypJtq+K1/YKQ39kPPtS53ImfXOIn+hzjGQEO96+OL11cDnBLCI5EMLY6mrxo

f0Dr61+j04xhcw5GrV50Q4O6QzPpujxEl5+bWxJw0pHVMNzx9T6L7qtyG6nxG9mijO8N7GIHLn0qdOLw

OvrijD8ZvhqntsccIpmSidM4uYxnCVLHXj3zqSzP3Y
b9qs/U2+Qq0LXr2lcbhKYN7gIUk+FTJX0WXTeXWxUS+qEo7JRjbmUDU6oNwwu/q5v7GNZc2Yz1Uxfofh

u30+gr5aAEf16ktyR9DfsQMsEfQXQKiIFEFoyLIC9HVrXwHJMssN1w0uV9BvdS62bZtFJz2KC8t8gNbE

qf3G4R4n1iCbTimVMAx93anPdoTxPG1UkT8DaSRgWz
NnhRkrXS4jw3F+iFBetUmV8vGhZ+M+nCZSFzXFYnfl1Lpofc/OG+khO7ex21UiGZ/hOicbMFCzoGki/j

3RWke6eQu7zCXkgsUIi1BqIcG1g1kHUlLYBLQnNqxxqgFdvOuAu8CBCLC6KfhAoEM0jblSblu9JZ/9Mr

d13IEV450qJJgjiGR8U0/Qm9/FPlivdhCuDm8eMRoy
up42uJRsbuBgq7j9Y2fbs4F8U9bt6xIRwoQAIUCGaPNXNh7z2UtZAhXBucMp1+aYukZUP2Z3ckTm5+mt

ghVuSZrMREs91Jp93A7YaxQnQGLjtSSe1KOfNfb3ZmWgpbOHu4VPwPa3w9i110L5jN9HgUgD9cgBH+4+

FB45LC7XPN2AWjdr7Mo1ciR+gJm/+N7kqG2f/Acvb6
qDb06NIFRfbJ1UEpH42Dnm3+dvFGYhYjRAx6/Wb1ETuJ0c6I0Ncrsm8asujYeu/7ORVIeQ5Up4Az/zBF

YvETm0s8vVRBd6CP+X5cxpHaqC/lx9iCJ9ywkLMviw85g2mwopR5Lr62e6DZ01DRcg/8GwN3O3RelFop

fzSnpax7RIyzCJiHlm0g/NeVOVSZ/smu+aMXex5puN
vGyRgXWrQwmkLaITBksIyZ06OyNUTG9j+2BP70PSRGWr3HaRAbP+ex1kRC7jdaH764ASVSUiZtNJyePa

o6sf6hIcgnp0suzYB9R+FdKDWtADS5CDqi7Hxws8xzkpt37yyzR3ZHhOr0HzD+sf05kdtyUG1pTdwi3s

yM3VJ9OW+Bf/0pHtMD9ot+E6Pyxghc9Tfwu5UyugEO
4IyXOK2eiL0d//JYd8PZ0avIYNKg3RpB6qBUaG8QcQRr7SIzsSQ1+vQqyjbSQ/PIbgXqWkgH/3P9TGbA

2GdPYh7Nv1UDJw/FOCqTP3Ujse1L1axq5IS7vdvLdGAVL/BS6m/XoAtFdnZPAUdbOSyGI+aiySv8q96M

E3F0aV+9Kx1DllAKV4zUeUrX6C8gR9NBZlUu7zIBtY
nQsnrBqOxfLsQxYQnvGtLJykpcST7z/zlJW6GRaCYIfew9EqSeFkThyQSsnn8D/rvzfhuXPO+M+xQFuK

ESKiymtBBwWJpGmnPAwpUjSFkmzkOI4iMkdAi426NlNPGmRLCIZelANWWBYf2tXrKTWrJ7FxTllFV4UQ

rk/7aGC9genZNW3ghkywtZL6096C0TKMrdjVE0/tx9
cCnuG7rjxOTJLDKThNzzOhFRNI5JuFQ1iqEUICjyFXLysLPYYtez2MyMZ2K1Cpkla8NmC+KtYV6+mGc6

tkGO+Cv7LkQYwc5uEtv+0epAID1hA+rTrYuLF2yC/7+ofWrJ4O3Er6E8qZwV5cO6LYIQ/gUR1A5Sn5Bm

W4HeG6kYL25+LPTeAR3OQuMTOhw0181RYcFytbCBG/
5Tb/8yK4Tuq+F/953czdsA8L/rR9otT8/H8LYkb/1+DK2cvmxr4s4LirPjD+Yu3a1vTQTjEsTbKk0nSx

i1k/KziItqyac/eNHuTrXzERSoqgL3Kr1elxpKZaqhhik1f99HFAitQ+9PqPPbK2oMv0WGU6f1d1qVN4

c8LKPHvjcQORi03+ql3BmBy9cB/5pF0uG9ey7tPi2J
JMSPjTo/aWdvy2faTbQTKm5hg5fMC8VLu3EvXKnkw9sY7FKYojJ/xRdEwzp2+w7wT5fl6sBX42zd9k8u

ijoXGZFaotI0uZrMFJ6sO4wHRWgc2p7l69vXlhv3FOE/Py9GQxoW+GerZIHaOtElsw95avC+QHyxvz6n

PNf9JI7gAnW14D3jYdWdHrF51NgGNrNvZ6qPqFwiPM
nB43Wpb4+2YDAaRVE/bab6HTxohPl4qOzb/CuTKMEUl8cgijMuVPgQbq/aMf3u+dm8if91pgef12Dtdz

G70LUJjc11YjQJeioWl2mJtrRaruOAMt2gjGF9Pjg1WgNhjrx0TE16ovcZKcHcFo2Ymg/YZJjh75v37o

T8hFv9U6JHfOsvZLhdo/VlcW6OTtZtnEDtE16Hkt5R
V3V4aHuBrkvpgxcTC5b15S3iqbs6zszImqGqiW6Tr/N2eUpvREDWjF2adwc7WKK7BPZ0xqlb4bQLe5uN

L3hr8TCwsRKNKyqa0cptBuL5QZk3B7krkXK/6HaVTxs4onW+amYJ3hc/kp17OHjNYi+CZi8dJefu4Xv3

mmKnOoPlNfe0r7WNLZ1lo5oJ9ARHLpEfWFqydY1owt
KMJMokTfEI/zA6DV8OSkOT5ppxDLeNp/BzWFakmdPe9ikm+WXthnT9rpAYL5c+5Y8E0B2vIoa9icRRH/

ftqqZM6YzPGXd3mL9t4ACy+mymFK7hZrWk/ke7e9BsFwWuJAbvvICHyI4HokFwa8ZYfPE1q+rJrnFuAE

9/Sbyc6Kd/KZLIlYSChOMzYa5rTkGx0OvGiMb7IoZ8
7ca9QJb0QZnxaFxo2bB3OuH/QWgb+192tDiXE6TtSEFW3Yt38117WEy9ufEb6caxK82APikaTPK0L8NY

QvSe6n0vswE3+TAKmAF4yZzuCdhaJUfP8eK6uyS2H05xHwqXDfnHHPJb0l38OE/mKEb83dLgTgmBO4eS

Y1IRsb6wvePtuLTYKTpOwzA9qup8eeOgaS2LlesLqt
h+s4c5xaSxG/Bv9jZ6HxfaKgf2SaJ7WPmiVyLBa9h1JyZ1eI0rkZx9qCnOW5RrOsAc+ZibBqTiI74HMG

2vssTq9jRLRV+NdhY3LGx0ZeU9jN6LpXymSAMXfdj+6CDuFiG70Vl8rJ5c1eY22SBNrjluGm2CpYaoMy

Vwt15sT1VDS8jQOLlXGiwtfiYpL8GwKKMboVYs20+8
rITBd9Ju/JFQziEdZ/N8ZSlum9TZqVOtYEfy1wTG4dG4Tq+x5GHi5Uq0rR01lU7usSyR7t0L4xzHO7YM

prawkdtYGKQV6cZKL61WvZzykYygv9akqnXULtzCePhFe+rxa4KrNY5P5DWqDb3gK1GICaykwElKv5mL

LBh12DUClTWGf8ufRTh+N71eyGcPCpZ8viLAvuyOlD
oVKwTi3NZyR3THocDIacMTrlODYgEjglz3ueFOG7yORmQSuINAnkC0/duhi2ViX8T/xTq3xiCwuoahBR

F4ZPzCkD1SvuRk3QwPqx0EMexSQEQo59+LuVVw6ONiSEuM7FvKx6pEA1erjEmQTBJP5P5x/FjbT7VV4f

KvHY2UheBAIpY+TtGUI1Cvsd8xKONJ1a9SqKOpLmy7
1yXWyQ60ijKCfZ1IHc0/pbjN6Ix466UEOOi4KCWWOLm5jtUridBUie6QgZY9kKRYGDpivm0YOgJ59QEB

nUHn4NG/HYlcVXy6T/wKT/lCJKjzqQHK2OqxK5Jp8+tGRcGoF1LTgZZDNqKPjH5EaFZC0E4GrS8RyrUY

wg3Ic59AwOrCah3b9sPvGwCh6YZ0J+fujpHc8zxJYW
J9l7ntMnJdPkmLHj3aWDd6zEr2ZqXk0G9UNwBe0vGRBuGMiohgVKCygOodGqR0SuEv213t79xfGXNM2f

2j7zCwF1+bMNIa/8LUnUKPRna89H4Qk7F/95yHfokWxwX/uxpUms+IlHfAWrsLXY5qmUkNaMxn5ZIH00

SauksN4vnXU2Y/7DxwTUEoIBc7TxTfosUgMbgAp4Ii
7PvrZPm56ZB32upuoAaZ1WbHS+zmTbkkViqQhZc6dFgFm0QrU1n81DsiKehX/Zm2QKo/UW/Lrm1yWmVc

F9itGtW/LJF9eN1vW8G4+kgeLhHMOPuV8//mJNlZGdiWo4NCIdKDZPt/AuxjQ9XlwzlP18S/6mDqG8PM

SpDKC/iCZJk4PvyymbWx6eO8AR9otFtt4jDturcD2K
46xhNzvGw2fspx4YHioKcbnfPBmMjxuLsks0RxO5XxviVowMVjJ9JaOTLCzS26SoJrf7lv45DLV4Vsxx

o6ZG9WDA104GhXP85KVF5oW4EkaWKnH4jB12AOhDEowNV2FDCE1opItlad9VXBIiAl+Wkye7RuUFCcDF

kNvGEApgHxxcBry1Xi9F1DKeIvlelNuybK+gSUf1ac
Ek+GShnymyrnTdM+VYu/hwJ7FPWvKL8HgXV5KaYBCRCkpMIOfF3OEVsI1sbCldV+j6lsYexoZK6ZOH5X

VWxAnZzn1oRuToSBRm9x+OjF+EsNGJgvavsAfz8a1GoQcxfdIu4HL67aImcSM6ZjlHaU1022igCNRahp

n1yUmYKa7Ue6Hs/DAqSDDWl4yCR+346ttC9UNbF9/E
3exMYmFKg3oJ5Ue8xbAHHkOoVFc/INPh0Cp1GdN8C4h3hiXyaOczuZKKm4E3TOrm7z4E0kDZZPcVZpqm

tDJQtO+cM2Lf8BUmNTfCO0HUVHGZdcYPYiTQ26s9s2IacPZhsBNgX4MgoKcKKGk3dR/1EJBJZ0qw6g8O

/Ri6+ZMOa9HPoVGpHBL6Gxc/GX/rz0VP162fOiXgAH
lahcymtyEsNPas7rgh0f2oCAf7QIx+pUrk7bSEqubkIoRbzDiqTSvbMWEwK6bO9kMguqa1miGN/As0Wu

pyALJ/4d5YTqDI7uwHlPtV3EAIHVJXji9Mtl9nVkxlTmAmw1NPihMTMDEIdtxRLv6wT2pK68KpRqtbPK

ZCFZO7X2Vqw8/MHuL6NGciQY/J18g9/+b2+euw/COW
bFoLJH6j1FyGjslSKEr1XR+J+tC1UodO6Pg0zPY/hyr3RHHCzhs+Ro8VoBFCd7RQ+6Qbb8xhz8zTo/Ur

jSmO1JBWxPbHJhgSCg7hN59oa7vugD8c4JOS8pkbFT4aJxEyljf5znN4mEyljVE/5kM7Jx0wtFPl3wLx

tbvYX9Xoy4b5YIGcIKsAGEaOlqVAPHn6AxDnWVS5NB
nLHRUC8Bgd+NU/K4TeKmSOzC5dGaVz08L/CVaWzheNh1piJQeHgdR2hDr/Y6oSZ4nhnKJqV/zigv7wea

cu1eASFRonB3RJxSnWuZ+hibSJDYPySulg3Fc1kIjJCZfMjV64ibrHGyx4yM/DieJME0QkLcwSCtmR2t

rgPcTT2h6KYtjELNCRApLmRlsleiZ85xubxLfMh+5y
kcyReuhn3j/O/1jNylY/CUiZ/8+0Ee7RfRvA80GpO+RjXqyTvdXp1R7Q5oSHzKRjK8pP+jBOMgxNjWm7

O03FRI3Msvg/kD2G0NjJkJnXmG2FGf49NyTq3Ihpe0K9ge1UYUdWA0T/7TEW6OnvrrY9qdg2vV9I7CDn

juchJV5Rvbo0SQ1gbKdSZ0NPDU1OI3jw3KD8nXGZhx
G/dMHp9ob+sIy8ayM1DAv2jUwoG4rQPluoJYXbgWAIytdBc3E363n73/FGMxz5yQvhJFnkcVW2wtmAl8

hBYxf8wu/vAFLiW4pKrI5SUD7oFN/Zi9GZpuNPeb1Dc05qyD4Pu4AV/6e7Yv7GzPVWq2awk8XjH7KnEl

WyUKZrtP+QFB4g5ZSeRdtxV5WeqBWwptW5fxZjxEXP
VD4R7ekvDUBG1kZ1xOYVr/QS30dSbKxYcPab0kZ7vW25pyv4yi9bzqK+U/n+8rRf/O7vDMqvxHGFIMd/

W3kv9vGD96io8XMRXn6CfwADZY4biuFiiT5t4Z2+gb61ucU1pgFhZq2Yni2VAfGxv8n4wXWR3b3qNHr+

6Qe22jt+ltd/2NNXqxb2M42mVqnYMErwJqPmgJnM/H
XtCBKD7IrboxtpjvN/7boK6yhQT31quz7e9MaExzyoqFl3Jw9s3Hm7ixfi+qOikEhWlMseLQDvs2jVSn

8Y131CZrFf0lDds31jt/t0ECPRA2tphfHdsrbwys+PowR0fgKXzmTfhn13OUih8eQbjxLWge2z4EWkxN

bAvzDX0oQfi4a0eCYtKUQoDXoaswGbP8R8Q7wHMQWx
QbRBWVhSI0rxPjsYubU380Iwea12U5BbQ7xj3pBxBRYnj7GvTmh+WMv2fAPBBLfrmqg34Qh7sG3IKEmw

AG8n6Q0c8UOaN0OS4bkDg7ko4OqzyrhMf66VBFUUZetw7ZGmTikNrS0zog29LVmUxnL0BKcxpU/8OUrz

o7bmZJpb5LVFjlJAU1R+UqwMp7/IpBpiS/T0NILDwV
mZURG7sIcsgDZP9S1eZN8EPcJjkRGKp60ChvKqwmv6W+JxOxGcEgMLrLx/pI6CF8iIHfezm+DrucIcUK

uDe9jyoeaf4avixab/W4Di/xtUnOQSo7dYCV1MPjsPzwR0JCKDQKZvCK1rxrpkQhTCSS9CqA3GunaHsz

sspF8gHMxLRWZfdiirbsPGb+ktPKf0MgVncBEWh5DQ
SdQrRmHXiqvng19Sm/Pj5Gp865BMw5UTS5mtJftd98Tk9ZyAg2u4ZcgMhpWPyBKUKXivYUxv0vCYwB36

YSHtryn5eCCU4asU2HSI5McUYa115PUXN9LN2krHjv/NrYfBKn3l51mNIGRJ6eguJD2Ka9PppgviMZ3D

Cqg1fRP5Jnlceu/AjBbgM73vEddZjAH99c00fdd4mc
hO2n9iDCkKbJSWSrRuAXZ9zGX/Dh6lBpe5fgJEu/8AKX2WnvwPL5UjLI/061UWg5ZJ0XPVLJ1qjsxI2K

44L/v5i0VkRh1nXHyhmeKdEarQUSW7I1BQNtRgJAZ1s6v4Nd9TLkv7AFyoYHyTnVjVckUFLj8kYJOiAn

iIoWSxji6pAWgCAE1rC8xen+a1OM8zJJtsiFTPk4Q+
hxV3zim7v9EEBtL4gD+GZYFE2boszqgVwJ7Bkfoidg0IDgdDZSFDa9l7LGnHjQY666eQ3hh8VqeHMQ3k

KXucttzbkxgugC7gl3v5dH+5TCQsDhroWrfdtsP3ywTlcAbMl8lY9xHhT3PaXmaQcg4UvHoexywuC4l/

57rdT98ygErU64T7v2U6iGxm+XN8jmG0uChK3RvRdU
/xWC2nIgy6tXnfO6mNMFxPB7XLDjKKpHBH8+xZ/cpk8g2BoHEcC+iHn+WMA/8orWqzP+p91AMKqkx1Ya

afIji7O5QbdhCqbqCgiPd/VJIgDs5BSf299720PKOwF0Py/VzA47Vmr2Tc1TUMx16TyqZ8Zbd+pzG+v8

nHPHYITG7es8EfdWYOA/eToJiPaGYMbx0zPcUje7fB
8cDluM31d51ibEx4CPXQriIlTjZPg+M0MhMgoNaHoBrIXLKSls3gWfJvTD9gRcLXjRuVAZoDqiamz22g

aHZU+w3A0+aFitbwITx8RisGDx2fCOGp7n3nMfaJ0zXA8sfzOObP5OwsmxhW4KfH9kDfVuGlVFdbNlHv

PshjDl0V4NtT4beXLEqspKfx/afW6wMD/C0g02nBg+
db4vVZ/COabgpat2+mvJXC1nrY+MnpsVGBbWzOl+RDMV0R/xdcZgWl0Mm9S64RKpOrZ1Sgm0nG68Ixca

ATr/hIasXoWvgn5iqtR9YQOohs7FMV5/itvikTCr5KfClTTzadq4ktkP3pJB6M9kqZ4y81oElWe6Xmvl

3BNok8U8l4f39oE5KG6ldWkdJ5CX5C5AfJVd+c+a4A
yxAbvGe7auteaJW5N7LZNENhW7s03yh4lcaiy5Eui9E4JNnjKraFCh8RoTSe90B+ptPiZRh7103v3Q8w

9UWVVx81AiY14DH3lNYE7SMOgTd97EK05H2jk4TN/3W6OokwiikUv3ZYJwEIVg/99nqInZwUppIOByYm

pqsfAPjLBlKXOKEPAFkBugBuZGBURaUVIiClSDDZik
+aS+TLulB15bf05zH3omY321xItTko3NIkrlZ3BRhJE5O8opUnHMKVr5nb5J+7EfRKZRzKdiLvZKn7CZ

9v9HjrUibz3q/8vRWlWlYGRsUDo8E90aTiG9YzokMfKCF92nDHoeDjQ7t3KjHvLK9vC8OrhBjx1jmpd4

Az5Vfy98Pj0+7FnkGjngtwC9BIpaEDY+l0jVp+joQO
i2OVpYRMnAcloAbKRhcQT7SRPOm0AdUdOASCMhiqqmmCyMYzwQkbWVjJBWFoF1OcnEWmLhvfWETpz5UR

YpIuAq7IqVLX8ngPyL4huEG1P5j129FZLfoQM0rlV4VPao4+L1AhSEPSWjtr46kTe4rhR7DkWE8b9pfL

n6zChJXjmMISHtrPweam5jVcp6a72qZI8I69OwC2rX
wk0EB4CHmWyUU+j90BjehPI6peRws33mzP0qwPgckw8BYFAlmWNIDTjXVObUU4Gg4rxX6qN/KslfY+u0

R/EA4GIXLjBtB8NTxwYQtP+0zoEVhWdoGuxWUkUDl8hocFohWlpIt1tu+XNMB/pEml4PeZZFVcHzmLRV

brlMS6XfO95VmKFWhMmGSJri7+DJTdwCgzSMj5R/eC
aSMeAGF/99CvGIEkoqmlK8Eme3P215dzYLYb/y7uuaLMhxN6ShYf6skytsJ/dygbzH8hHc0pFQrJJD9y

2/zhMSAipzzdlUtR5MZS624ZZxYtIXhetZXvCPsush4MEsWfMbD/V9cxewCDCiW3ERqO9uSaTJM0Qs/1

MEo/ghhQbPjoxotbOG+n7W8spydLX1XSHWFJ//mDfR
uxK0PA71bxRt+uYxNz/uaadlMEAQu0JhAKHg7hsHaUD4kAKvkrK6Cph1262pJZbB+cbzfsJWz3+nEsUd

sPVvlYCC46vYz9O0TUm762H9YqIWUpUqBhNYnGeF4jWeeMaCdFhjXe/aDdryTYiaw5Oo6PMz8wP97cRS

9bmqozDZ0UKw1SOSmXuRZ33y69C4E4+3hCAw4276Yd
[place]+U46AgMEg55I08TRguDRCpzNP7+dlOmQ9fWCwaVmFHA7foswyul59h33Cn2eRGDtGMI6DdvB1ILXBv

SUx2awSYkyZn7Sd2aiyqtnOO2ftWU2PvGnPorVknj4kJBD5MzKTXRdPfBAIzjc5eB8RBBiVsHKC8py7F

PyDAXnwjvMoNP/n2tiKghUvRuWvXOn+/6AWT+BXYUX
ecOBjDSbEMCrhfw58+ACgxEoqr/rRtIeZwrRUUSyb3LwN9p88VLKKgPjoM3D/OaY9+U9hZ695L8Egw5z

Lf0GnniAq2ibob9U+tPpbvWkb8esAY/z+SeUoZHfswLF069WQGktt0c3KdjzOQ1TTgn13hBhPePvGQne

K9fU6v5NU2mr7U0GVJzDqSTn2daKv/73IZRQ8DvZAy
JpkY8jx+gyRBDzINe9kzhwW3+FRZuDVvWOfC20rlyCce7OotUjgLee56AYoeQ+dpQMM485mBsFP4D9ge

OoifFwcP+/rCupjYBV9xefvoYK08T4KWv/pPLZpHZhCygkH9OicDVP5r5IOVbNQqmCpkfjLVAIwJJnFF

DJZdLb+NCGgyxi6j8341Fr63a5w0YNOdAQFI+s9uwX
UR2h9qn5yo3Kub2AMUp3bPxG5BrnyabLhZZiykrtD+16+6mPICue77vzbt+d5wQqWpo504CICcAhIoOE

UkJQNK63PAMKPebzfIpA6LpaMlmZNVo8HYg/zWWBsnSBVu/FkTLT2nl71w2ZH2sGz/8770OhFaV5kPgS

g0NNR5774A4PE7nz6l+nuNxr6esthX4hZaOpMmxZy9
iCV2dTpgS4x+vFgQjKKMmyCCU9LAindDvnT4M8XjAg+bJU46wiTTtxK6pU1uor9VY87GFN8ott7QC69H

LUfiXOJrG5pj5zDzkhlbLuUv4ft3M7XkD6+ZvU6aSmy/sS9RmxCfUlapjo0FcMlZiqe6VWqd0J+5BD0J

d6RIA1jHgKQtllEgXuTLtzo5S1iQp9eQUzJz1HZvbd
F+L7mbW0prR2SlJOBG4v/8pP18zNQ8kMVo8QU/xAyes279Paf/3qFvskYAjX4C+0Ze8sgTYWR5mbYf97

N8fJ+xxdBLsNvHbIQhtMsG8D75yr3hupmaam5veQQUi6YohFzOybk++ZJFughxU7CH9TeIlmlv2fm0Tu

qlW2NB/4lYqwzjd0WM+4qjkPGWWZiKfn107gaKHa98
EsB0LoJIi56OZeTPXUa70iuFSMwPrCW7aTZ3f4Sg2Uuu/LOBWrKh1eYC7QrgoX6zvSNifCbbbeAsLHzH

oqJpsYEYC00bjlcfId//bmmbryvF8pGq70NMZBaOqNJ+es5UOS63tT2MBUWgxFmUcyQmWxrBaGUMkIg2

BZf5ZeC1ESzhWFXgF3Wk97UtB6vGKj1/C2jckvtjRB
4vQ3GjdcFuSTp+gNfNu4+n9TnUqa2DnXk1ScFST/1lR4c6n659Cd7W82Wo90qzsOr1KS28qECaK4whrN

7jrq5GXgHTTm8nxg4MHMFP8MAVe6sbbdxIz5If29W+aQUGVnJ8sx9XYUUm1bYxhVyinisXaFEfxG4QBy

ecRWes0FDPhyDAZnrBpMxJSkWkaeiCfKP59dHP426a
4UWYFTpWkfhzDL0QV1g2RuRf9Gu3qH2CizwgOHo/DkQ+n5dWORaHjJkxw1JTpOyhGRxRSbttqv2A4ez6

NerjH2/GF47nrQKQAIDVbylcc6/5iLvb3+EhxZBi4laxiMRCMDa0qWspaC/qm96/lKmU4iPjBmm9dM25

xLULtlmfeNc5Kut0q6dNSS5tOQtxzP/EPg3a+JScpA
j4XP2w0/XCwMNntcaI4yx19Jw79tCUWFF/qrVfbBaKvMikVZXib5IUtU3oLmoTXziRqidSllvT50fE7D

RdZlJl6SFqa1eO7V/wwVWsScZ7KEgKR0/3nxTdChH7Hh4H7tXX2uZyOri7jh7/ucjYjSgudwHZPuurAa

brZwMBWWY/Ya6WRyDENxgLONURpGu+hM4/tJpU4f5v
smmWUl2BiEx66oWUDXBescS6z4nzzqac8Jf33cMAmvqW1rVkd9DJrxFvrXODAQemhf4GoAuGkw54Kgjl

iQHLZYYrtgRUGxQeYi7+nkgmmNOCbRCKxiPeML5Lt5feXab1yZiY322+CVmlrlWiMI6493jhQB11Y9u5

HqnOsl3sN/u+GFFcSGJ8Yb0dam4myMdrmx6aaHG0M+
OrJpfv2U5AgGNutvl+gujs6p82cMpB3JR0gGgw8JOcE7pbFz9K2u+OX/3cQR30IG84XwZmoQ4KOqgz6K

FJXXtbuCNZ2TfDnYjNiKvzjlPMFbCcdON2u9IQSGwWaHpOtPSM264PIJK35XvTzNAK7ELR1BE4C9VF1A

xlwOLgO9KuH9WrYAD8+AClBYhed7l74cBxiJNeOJ2O
49NfPoeEdGXy3wP/U861+L1XEooksIAVWM5o/OhCXE6Pczcop5zx/PEhXRfiRRmRr43pDNNdi0lbC6r9

0cdPuKgVXq2VkF1RmH6HQ5c00fKfUP67F9IrH9xmoNmqO2pgw4LIrl+hCaMtcHiytvf2XWfaVRdR6i52

nOBtTt4b8AtIVui0XiKYlheekyV35n1an+8QtwNzHO
aUNQfaNCop7jblKYBWRgiPhdMozzsx4Mh9fV8VUqWeV6MZiRbRXUa5EHTXOVL3rY6JAGAUQTpwLfE8ke

+EOLn7fCJAvh/kESVRFQ+HJajXt0ZGDxDWsMVDWREUo5B9XUGMTXh6hV8NjFzEfe5qlo+wJspnTXGRUh

fOcH0tDr4dWb2Vv6Dm3wLZjtZZMzlPljkNdbfS5U3h
CZIHQ5diPSYr2GDhrmowix7Q90DqtUR7iAGOhkdRnGmkwneT77eVgfJ1fTjbYQk66pTJ/cA/1Z3bCEB7

RZsAtrpZewOcjjmunVgKPD/fV6jEyUEOYLmfWE/6NVMDFceWbkz8I5oP7s4Sq/YN5oihnu+UO0Xe63/w

/mQGMCC3YYyEjn4yPzvzr5Wf9XnSGXKMUCgwZDbeHQ
fpSc1xaDIlIWhcpaOf86u1k9xhSqG73+V35bw/td5UMIMkhm3ZdmW2DB29WFc/92vT9HEvM2KulCpOnR

HNNvIoXur1+BRuPN2Y9spBYm3TrmBiGZ20EXlYH96/5e+VR65JYz8ewwC4E265Bcw0ZopJsEjWi/00xk

VQ0zGhne0WoBL4/5CqbeYuJc85jWKCURNZbnkCwFMQ
6Q9sNPxOXQB0NLfVgsueZjhmRMHFYSkqk9dr2WgAls16Ix5Jy5wqtK2Kflw/zMPc+T/up4TiaRtkpf9R

+/hg2EvkxcwbTdytuyDMUp19r8dFW2fGn+u1Xiyaz7aDTy73p1sD3DZlz/+IA5uAmgKLZOjpghZ/C+dy

pjkZdYj+7F6CVBBY+iYpsxepuSC9ydfgeKScEZ/KfL
DJXnjNSY2ciAC8lHfdfO79kxuhsLiyUg8jmye6CzEZ1FiLmOCbYzhJL4cY/WV3HCDdXA+Mtv0W73UauM

zKBZcGoxGMdyjJsZGgfAuoSyNoib7s/wJgoVDearSc1XlowU5H3WNPItlS5qFZV1OVYlE2Lk5dA+koEA

DRk5AZbnPvtP9OLZ6BPqEmy16XHnKw9Xr0xwBeGJLL
ax1ZJPKmc8NmkSX1rC3zIyi0KQJutQHEtSMAFqeovg/vAS+eD8XuLTymbjw+6KwKhtVDdfXYclgtHydW

k5Lkte7sGgjEXaX81z2ANq+uMZ9+oQwF7CUPhea4y2gROU/diSjCno5FY8Wu/jSFVrBIzztG5vos+daC

wa9D5YOqug0PSnvvw+s+yBR+hsIxmYoU9VLlbADx5P
FZ4W++UfOX5sYG/8w+piSbS2c7qJH5bYMzQE/PHPXniyXZ96h9u+ladhFe/INdwV1u7/8Z6jDwshw7oZ

Vx/x0B6+TYKj773+nDkIu3HzoSysFOEp0mVOmnNz+nNxtBsouQPd3d1cqh4arzIeng3SjSEtwatQomRh

PxcZPKSqTBbbrDShfHiedb+JW65E9fFN9nQtggw/vS
BpUW4Sp6JhV3ZW2nikL5wYdeDgsK8WKAFLWzyJ0PaOwuK1gLZ8NfuLGsaLF9qUGmfe245CieD3RDDWad

C3omk7OKS7dCIM3Rwcj67UXauoDEp+BPY/5cIaH+lfBpBsBfh7fGNdLiwYABzkc2nLIb1C9j1iwp5yXt

gSCi/nKf3yiwpSeIQKMjQ2iZguKfCQo3jMUpwO0Qrk
LOzJRohjHhcettn3kWxXcxOuDzr70dPlhtsXZRIJco4ttZ+jbT6MxvH3URahUMkxkKDvlJnW1I5Sjf10

WIqTYw8YbjKkXI21w+KAHeEAjlXY6y4bpZPl+9og+V0B4Izs5n2/VtKnlmqtEemUNt2aWIJqB4wAOvLp

rS/90/KNan9eLRo1mGpC2dayJ445Pv08O6orkKm+ux
h6KfybWODiy/jKBqr04FAO2QEPcEIPruq3Du9gf59Hmb7M7yHriGrSDtFfENvD1xDRKJDLzvYAouKmpq

/nvB/5/sUF4Lfcf6vIwIFMuvSytfA8JZCM/rdVpQJeppy+Cbf7/kfh3nCeUYkOqlj+7vllmBkUeF9Yu8

dj33u/qOidMcCUx3a928CZRJNDTMAT3iQie5EVIzh2
i+G3XAnESRqbNE5yrK2FDAM0cc1fY+AF60ziRUMH8IGkdiz8fH89J+///4HV3/ow93tWlgfuPNGpBz4C

SHPmzaNRUr1nnouN8mkt/SsSmUbzbVa0t3B9ORvLDsvQ62HauZyyj6q1hWJlZyWPks1wDZAFCtWzuubP

OLSlXi/xRS0jlUh3kMNHgHzHbBHjOYwKD/uEgj+n6C
lUaOMVQQJfx6kz7d1ea3XvGp3kJ/jZXUzQ4IO2CVNzur6Eq3aj6bFq580RcWA/mt6T6ZOZT+qQUvuE5E

41fZsGrEd8BtW7Aw/mORkhwnlp+PBpRoA0rqXMP9mXClrySslza3VtC2tshxGC1npp59QdTPS7u13sU2

ElweOyXF+blEuww1od63j75DJm+/IdQT/Ssc9gqsKu
j+87B5Oy377IhT5ABfTx0KLuQV1Z4s8X5ycSjfFtpLDt6S2R9impbVvcib5kJgUIo5AhN+Hkls9u9LVb

KYviIOmQBSoKBBm+ysjRN27+WflLNvd1xIBokUJvn2S9n7t1f7dbGRvWYZXskSbfAKGES79KTCFp+Ryn

KO3kkPvWRPnhoZNlhK4F81GgmPvDuaNDoL+/KI58mA
3BuLW5GdieiqZx1vEzd39jNkmMVb5Www4r8qQUvdczb97JEMY++9QmHcD9lEOM/dvdgXWjaCGOsJmax1

iZZNFIwFT4UyQe9U986QU5oZCehdNQ3AkCiMDswicbZBCAFOv1LdoyGH5mNLMgg65ihEudZSNhjMjl2w

HNjgNoIhmZs2tHq6xyNqYdAX6QZ2SjdUgWzTRQK0QP
u3GUfBhMo8lSg8wx2dsmG2tIP7CFX014++DAYWfRit05pgY/nU4jrmR+mrxvEPGSoLOW0G4skyT/fkQT

ipf1+9/lKfw4etqMtv2D9gbdcR67AHm5ru8L3EGBnpYOcV+SJ543v3B4Fs2f3h781gRBcfX5PvnQ6UhF

qwyvtFUoLnDMrVPKsF67bwQ/UuWODtjpZFv6R22Xcb
GAsDgzN9pE0aJt9M9vRnD37LCn6wXzxlz7bxpUYXbo7AmIrEgLGHqPoVC50olvYCru7LiRvaAnaf0bu/

h1bcEbBtgkOkA+fej8rcFUorqoKX20pF2iXBPdUcbVD8GzryWuAbu7qB0FVbitlq7q84cqYmj9JvcHCF

+H91j+FX3CEIcT5enkExzzrZ5EHONSz6n4buwlUa23
ORa1b1kwg6UQVCcwPnTzhpwdqcWXY5m/RPXuvJOVL3etB1fMgcAmdV252EaIyL2GItkUMIIkDfxhejG/

gOQ5v/SKwm2CD9SYeywyWwxBSGbcgu9nYUarIwd4rjXw/bSr7rklEKCQ3xMHW0NeH5HTU8+3wRghUmQ5

N3fPktllsgcoIUMp2mr5nexa8Wf0xPkFiDszUQDvPQ
ZVMPV8mcVI9vn30hLLGPWth9fJHFsb7vRA37KeT7WeUqPAw5g1nBPP+FmKv2+V3VogxPcbjNPWZ6sPat

g7Q0D3wLwx72DfPxVuwEaY+RBK3deCbjbSJlhOHJ5IYggkJfa/DV4K2RC82rlFB8OEMfhOZk2BmHRl6/

/YSMJldgFYPxCwXjjTfYaDhj3pmHMSYsxffxiWZstw
M4hzbQ9J1t+0yEAWZnyr2a8WpD4U/MAixxc4lWM+E7mRjag6l0MKDdeze5bfFiESV7ZYSDmsVfQWc8Xs

FMxVyf32gKkKG5rgC89qSgAxXjwaYe73DhrnF3XbAtKTUtqHUjpxvzy8Kd1OyPnHvef4pcc0a2ctW10b

cH9c2wN9nU7bNjYOF0J1EMAh/oZ6wtDXSUU4Lea2rP
icPSY8sL5rKtVkSd/rvsBzs1pNTyrrfD7wCC9uWGaVkrPAJtutlJI2FnmVMEJ4MBp9STiDaLy3hAbyVZ

q0fhPqBT5LsPiH9SRKca+1yI974+X7kgcCFOoATziqga1zDgckYH61fSKL9f1r1pwnJtV5yR0CNh7FGv

NiDQZbbH/nDts8qiFnd7OuQQYVysrk0VwNnpqwJxD2
uNQAhF/6ubxpK7HBGUvA2OkTgPOOy8aK7YD1kweK1ZEh5j+5DYmfidydXLUOjk3y4Vji0QL3y05KTTcL

WI1vlOauvVa09IuhX9RzN7jonkCG1Us1hZjtXOJexsEGRe+iZLdN54kbxExDn39g+/egY7kN5IQWanXv

savs6FhjurUQSzydP1eNRkaxA/yePsf7EG30gizWbj
5UO6G6V83v8TFnI+1D9clbWQAJL7WDW0oMMCRT467oQaVEwn52wsfnzzgUu58HFTuZOmwY0ajbggkku5

OBl5EPikpoYyZaLmVhNjTEoRUv9equwgAX5zwZwWhZ4KiCCwAZN5kAQMXjyenNFQcxdRqj1PuZhDp7dD

3Xv3kdZhEHEP+Kwwd2HnUjz59ozoAOh9/j8B71IEz4
GjC3cDnctuqMFy9+NoLZZqNqP/rYjRlRTpLz7N9evdsoNfKQQbxl6Zqx/mTbZ+3W61oeAFqI95ZCoVnA

wFd1wlYJts9tWpVuAYg4X+w/gv8heAnpObfqPuvj+vJoKJArHoeC9gej3kZfn8esoDBEFIiH6BcUK8BY

biHHOhl+ycmRp1xVm3hy2ko+8WRhC+P8YNs0TFzycp
55lhj7eASCIPZwgEHF4g9ISe2WUGtAFo0TA96Cu5wX36fCy7EdgTdk44qbHbVzlzWgxGeSMk2G+Kax/j

gshkGtZaE5sgatusDTKgXCMDnVjIrmdrAgJcQHn1Cw+LdDh5Z/yMgZYrwZlRGCcBNoMEeHy2gWwDhr3l

wJy/l+g1zh3JWrpzAo2sjjZ0SBhkXfWG//9cDQPEhs
igBe3O2Kz4MPKF4H9aXRF4sycJ7xmwC9se3CwHqdO3CSCCnm/jGwbVLsKr0sf4v26AYsz7RJeQLI/2WV

2VhB1wc52grDEn5eBbUKTvwJWSJKgnOV6R35lMpkKgI4uVbB1rfDaZZLwGtlviJEqy36nhhjg1iwI6ld

ylBntu5752UeKD7VwOB1XbowEN510qSWiW9iNXEn3+
5dH/wSdLenI755xNT2znD18uFPX87QpdxJI3WIsnbNDQ8s9Pa6Xb++rw/Ckl5HjJhLk4WjE3E5x6sLL7

ohL631iL9b39RAkbe91qoMizVGu5xZl0o+f0Ar6GVcAigbNEdqWD+eTptrCJuy7Y2vaK142+M8LmOYBm

q2YVbMM71pZQVLpEKEMUv9eo7NkWkSMamxJ/q5jHRU
w4xklT0O6VPQ4wdbDeTGKAOAok3HKG22aiWkDvPryP9ZtFUeEN3vudtM6dCXmcutXzN7kGynyz3MJE1k

if6vJ/6rgHNVy4MkipKPqWa/EVQv5W2J33ZrsD1wnvLRF+ir1fXTKt8VQqToS4VI78kRTTpZKWUBc9lM

NbbCnADx8GiMDMqgWlHecSds9u8XhTZb131wqTdy4w
I/qnbvDmJkQnsG2rvHvcmWQD6wVaUU3S5mG9ji9b/JEuPX3KDrOSaLhuM392fA0UVS9ids9fqmG0PdcQ

UqW362H1d+6RlMN9o9GCK4VANjcOd3GCgujPBwWrhZnMrB+7V5c0zU0+q9yiT6xJfeeIZmU0rc0RG815

V4GlKvmBuSAoKPYbf/1oY4dWbfhz+BAv3jDpUEQssP
KgwOb0o1kVUTamAxMQyhKmW+5AtKk3D+QYLSe2nb5OnqYIWFHgv9NL4mHVhgPbzjXM6e2ql0xhU20i3z

n2/3lIM7dhMEo73oDztHEgycub3ueLsLBc96+jyDc4p+9A505QYFVE1Gl1TEI2sY7Jf4ZESwx8tAeb4F

rP+iKjo0Z0CA52/EfKyhGHMUeACwE39NOuRNXhbSWr
lt6EqkJsasim3KHz631SKBgSLFmGO92BEbtgnDl17eW8uPUMdNgFxIL3pe2JqdTWK078r40fzDuTx8HU

gEL3ikdViZzYVc12PDIzxb1LHNmaAuGk9x7UP1pA2lEKw6XjHpBeeSoDZMhkejjqAVStEO5pYOeUd0n/

MIA6p5U38BvCjNpSOoxby2La6Yo3okNQA7M2bmZZN/
K4/An/oNETSy4jRYMgTtRe6pQnsDErFUOIwjWDr/upH3nezok1OmB6xyuqD4inM39DxKTSILg5+oj3Ab

3xAGFGD8FRcylkYXgF7buE4cXp0+7RJGWXI+LYvr9dRNtBes+3gckcTCow7J5z/QN5LJ1mS/VNQIcg9r

oyNV8bKqAO7S4EucMTtN+h/en8CPNho1/I+XR5Y5De
Os5jUYWYdkPmkHCuXgGiNgI87sYO90DkD2ybxlNaWgP9gp5G4L4KgrdGD+e1HeqvLbXoE5ptyPggyZDr

QNXG1xFXBaviKVYfUCfc0EVOZZ8ELtmbqF5cHYJX5gcy0eGixe1FN2ALwi1cwYYN66WiMI3bGx90FkX6

HA7FZu+JnGqJWVrliDX2DWz1NkU9ZqcOJVlzwU0S4V
te/dniCO+In93axnAQIK045LIbe+U0g/A98GMpAQmSPElarUQ6h2hxmY0A10nJriow6adQiylp0HdNBb

5hS9nQXt7IWIqbimkKEjmPmj3PsPntFeofdwdZbyXs29HageCIakgWSgeM7c8ysqfbQDUh/2n1fO4WLh

O0qfzi1BHtp2la4N14qoKAwUpjyWSTdPYjsa0hYpDV
nc3iqkfQipD/QLHB6YSXlLDmp49dc8yqYwozBm2lETEJpJ7TSQDxsGM9m0gswmB77Dz1p58BzCJzdATW

ib7JwwO0ovbgF3a4ON+1mJDVRTpeItQTzIUL4oMg6+Wq9DXQ8PadxGhyccaEK0ig2qUUyRvt56VMj3Gx

+lctRu7rR3BZrXp15GMwxjA4hb3wfNFosVvLEWjIei
8RmF54rUq0uETp5rDvNQ8X6PfPWaJmQNxUutjLI8B7bA7WL/yz31eu3WnOca27Zzj2CNEOd05fejuCw3

JksyVRbc9d0ZXy30H6p1XRMoeRgXN4i41oEF7ZshfZNEaYs6mreAhULUermCd8AuuhxsNm9IHNlakrtz

zcvMZg0eKMu1P4zXuKeoiuX91WWTLA1u87iE728vZj
vZBklRmQk/Frxnk0mclb+M+zTqso6yij/feHinhygbA86HrkmVN8+C8fR79ujxuC5yykWXeixM58sf/6

b/tv+2/7p6sS9AeEtWX5wiCT8r5P1VNsf/3pJf2kIvlA4QiJ2UoQ47zIp/q3OK586F2FTZqFtao1SRW2

tP8xjTQnlrf6lnUGcH5OeVNnc9m76Y7XeiwntlElu4
a7X5P0a+235nspeb0k/D2V3SCtewe9LPqr8yVgmxy+R4WZsRMwpx72v8lJb0Wf+cp5IvxWrkscXYpS97

83j4Gja/wQabBjKa5ZOLBW3a7c61R3Xg3Txm7/IgOIZSp++2Sa2wAvTZtw1JlMiRe5FDysjK09yAO5+U

y8E0Ug5413DncSEuQWM03lr48yWDHH2vEt6E5hcrU+
vKx/KleOdeLrm5KRrQjqP1R4RbMTXp5mFR6rtVxahT5YT9xhDSIL9WaT9kEQ1AH6WFyzd/a60Dyi8AAW

X5A4FNwnj2ewnE9XqPMf5Ovt1RBMJ28/Be4glDjJB4TnPgfLGyzx5ysfj5vDj/C13Pbz7msPSKgQc1Jr

hvj0Sslqb4HB3lLUuw02DfxJdDlf9YSxyDW4iSQB3p
bwZu0qOhRvkReibbuuP6/TZNOPDukIae/Ec57weC5Q3Y06yLgpaCg9taeUUnPAAu7KVegvMskxzW17WA

ngiJRObi2gm4Tm6wBqb3SJLOs6+IZqJg7dYoRpLLcJBEsLfzeGhbmCuFHxRtcHdSoKQGO/cGa7d3tofw

wR9Jci3mc2EPxvTr57gIIuPMqFvbHCpEVPIA+dqTCv
uv0GAFgsD1hQP68HzixB4w9/6ytAY31z0iVjSJtexMb9/DAVWEk0/P2LsnL3cGMKMqTZWzRHBaq2k95L

H+M0hl4EvaifLEraM+21TbOjVoFZ0/CP7l6yOG+QQ72knKk4iKfeak+vawcajbDd/fYOvr4RtWrOx9cf

UVll5ixJGR1BHFdEk0n6dQIW7D5LhJbe5M6dhARfQ4
r8SKPu2xg1RYLhL+8AVRhhHEnRyNqJB6ASo1kbgW0/afuC29kL7AsHFFtPZCyWSbVeaU1PHq+3ve2mP7

t2+SVCFVjg55CaOIOSPSPilm43CkCuRr+punOdoYqnlahodUrUC1FuftYp0kAKKBM0By8dzOCEK5Usdk

rVf4v8zF4owpDZ4GeqVK/+KqhSwc6+kqNw2/1ZDOdv
44UtE6pglraCs9auvo42pbqoGLhqi72jj1pIe92k1JcAw9J/4s1Pa//sJ0JOu2QhFqhoK0UkE/e81+zN

yJ73q5UDlkMO6epdd0YX168Yz3FUxTFfzohMMbSE3aCheKK3PUFsk2VWHyP/hCtuU91tKDE3MuWRPqMD

TaP/ZiPxRb5gnBMo5qU06ck0w+iHFb2uxzI8dxg7G6
/ELbQ7qHVH76qKowrG1kDjIFAmNcFMcJb4fwupjLgq8YwjZ224w+TgGVxxGo1uy78F9Tq/eU9rfLF5bb

YX4dDSwwbwrnlWw7Z1tv8qupVC33ryxjoajnIMfd1+9NN+z3jGAYjZxbIhOCoGnfGLYPOmzBgG64H9YX

FCEGUBdYO02GT5V7BQMO0pFxkwfKh/BCq2ysrCE74+
IvhN4VIARz/qO2tRrQdP1SGnR8tYyneuhZVkFNIP6F9e6i0IYuim6AiORI5Q12nZE16+O7i+ZGz273Ur

pU3F9j+VCxJHFYw6roRq2YtCWbTuFksPG15PinLt3PHVryM1OY5WqndiaP1ma7P+3eKiptVTYut7GZZI

dAzqu5BXdBk96K++aygWBVPYbCNGS+8Imy7udl79Lv
EGs172HorV1iSupOKH1Gk11EDM2OqkdTr25RTyQAdtco6E/EMJpubH9mj6Skpmvpo02DE7MqTqK1EXpl

nEhxbo3OJl2sveTWCgPDKdoHsvkElDlOAaOVOJ5GBYVmcy0Afu/unrkpYg8tZPFdxnFil2YR1yiZAcVQ

oaTJJcA7me6SN2+zr6GDZZVPTYN8eTIH7R+QnN4POQ
/jvVINFiK4+BfJeJmZOhh4vCTpj7gomdLjcFGks+zeLPA7cDgjEv11+GDyYHmQYOxi6xxZf/960xtZ55

op4dBdOANz+9IKP14wbSW2103bAdV70/b4qv0XsHgvD0O2L3ou1PIqkJwwKFSZCb3ycSz8WL4I52rjOT

RWxUPBqDq/OencaFRhsnyVpqIFC4HoU/ChbFQUVqG6
SawLgGcjTBlfLlanZG95ifd+zw9hPhzQbFclzOAHKiGavV7h9jUYl/l/m8dbRz8+5WFtgEBrwRxbcqok

naCoiwrCFwiHukQdzqCMn4ParAwUGx41yyvephYDPnmLQ8Iu66whZec+sCUSZdRDeHxDdjYITl9KyrFT

ffDH883gdpHiOyKgCOsx6vbSt13ouHOpCOQtMr58M+
8za1JX1BZuH5emOkZz/xAT9l84wrBVZmRp9FejQAUdC9E2agwiikkRwHIfiEzib43RDSrtTh3WlZYZZQ

cVP0BHMpILJvg6X+ah37QW/i00sCdFMY37KKmXmzkJ14Z0XulIMZrLEdIbsm9sAB6OtrQTWkF2lxEmPR

6yBf3JQNpM+f3vcCOXZPbrMrILzQv4IrTlG4k5ns8V
bimbYylljMsQPHscKNPX+Z06LQIRidiT3ZVcBgygTfbo5Ua9OaCc4u6VBttn2tCtgrAAkEh5QjHiZ6As

7rSCfE7G7rJwIOqp8eV3Uy6dry+aCzwMrUJZ32ysDxgnz6/0HpaysaI9QLhZqDIzHbVkfj3swJ1gjz7c

wX/2Qy6mqbFz/svwvw9Y0nr7A81LoqyBXbBkhuesnP
DKdc1SWvL8pdk2DWHDNgSVfZ+/xpPmScick8coi3vUQOaIUeS0dql7RStmxSDbwhK3sfUD3C8C2Rs2S1

Ov17SI8Zz4bya7f3SzhqumqHrD4NHyuPsGq3b4+TnkZxx6lEO5MW6dW/+CDsNNgUQkX2T/smwtf22GXm

Q0ntdVrFS0++Ybi19wui6iwmrzXFKkLI1v5mDT6Vt0
M750E6oz7T6I5dIFgxqNdP48ON4p+ObOK9UE5xXztQZSWA2Sy0xqihXwtTUv+ZRMoLhaG0FP1DsWiK1e

lP7f+w7fo47W9jZ8HDQjRiaHmXUU/Ttzds+6S6C/HwFpuszvSxqi9/zsg1/TJApCi4bXeYnQvjVD2bZN

R3f+aSQVwS9FQxAJnBRknRmNu2+fCW81UflpoObajE
aShleSXxFaLGZ6HI2obfUiD3bee3OuWSK0DQlV5OglsRKsp+bfR/waL2FZuXb9VD5vOgW3M2/eRxZV38

K/9wtoB9BeiHJtRjqynFZ+l+LKQC/lOSbJqwLNtST2/BJ1TznIeh34AEHamWc82NaW1mftT/z6Tsv+ni

ToXiUsFqGOBdNbQZ6qXJoF9YLR78SP8seNxaOWOs4p
d8WHe/4q7TCGU4k2iczulSjhdwForsB8NamfypWkHVHRDhmqPfLFFwTeqnLKQD7K0Uk7oGFPR4vtfp6+

AdQ3KbtIxlP6EWWPDCxCpJV395BROo02Pi+KnL0lMuTJ6JzcvCyQmJiV1hNNI5GRfR6us9tslPNSFDRJ

hVt72U2m/hZyXASSE/qR8qyWKYWpdK9JUlpByzPmE7
zgHgA1B03GtORH/xf7TA8pRaYMCnMNITkvxVaiNYXqYuUHxziyjnMiruH02ry6SbXfzC+Eef4WM+R98I

KdJ6uAVZ/gGvhUCyCphT0ZXaRc94cTnX6CEwIYJu4VBWMC708PndUrX1W7NWJQ6Rguf6h600E313E+ae

IcmC0hBm9Kp0eHx5MPkNk0AMwm24Mo/n2pOdmV1Udl
cZWJ+a6AP0bNQJGv6+isd/9u7brDcH2sCvcNLZ9659OHFpzFv6oL71mWHkiG+kplZztdEdtS4c2LNNsp

fwjtL1VW5f4wv1RXrwhzx034wOGbtCPo9DqUuJWp1uL7qePpMyvS7ugvbaqIOybxkvp9FR1hQBYpxJD/

I00leQegjtMl4vIwP1vcKfnYso4ReT2vEEfzVHfUhb
LsC19eQ2eFlw8It36jZMrxtcRkh/mqu9fsYxrix734B00MiXIHJ6hA8ywtcJG8rcPyIQjDYf3yYVmDLs

6ZnBTjUl5AJBQhNj9eUBZIXQuA+jose guadalupe+7SNvXxwFHtCC8bLrVpllh+qcA9qFcIeRwEbb9xW9qzsIfBuc4v

RFE7x8wdUfOPi0aOlf52uGcWKLAql+/hJ9nQN0TOSB
W6kjeAqmmiZAkqqpPj4xvCuI0HvIrWqpp3W0nrkb7whrF/ewXClk5du4RlOmEncEJ0WlI0XENG916DLk

EA0UeQOaaZ0kxIhH7rVdLmLIBc1ns32EWzsI0U0Ds9nl21nzDYgxPw+mXOVe/xvbZVhaQh3vLEKwYap2

6NwhkHsQluoXZBuQtV5XUm564mFmVglsrq8sZ6zuYR
smupFFkkW8gdQ8iANtvKgwLNyvrBG2vEFAwf6x4H3Wo4Ryji2tx8YdzwVpeL1hZJAAigJfHshUZKT4Od

94W0pQYCC9Zmo9PTH2ZvQf8xYjgimQ1+KeKKQHkatbL5zurL2O3DyzcuiorES0PTbVAsByBGR6Oc8uKO

u1UxZTkVHgHzj1/Rvr2zvg+f3MxwREgs884vW6j7OU
iTv9p+PRBicZxg5XD+xmgkVmppGY/KBKusgHKD1o9sE8xk+DizO/XrckHzV+Ty6/aWW8ARw+nn3VBH/E

2qPGjy+M9n3Zkmivz77V2+JM+74V5xMAUVK9lpsKQ8Kq0fgv7v4xbmrm/X6pqc0uSP4Jy/UG51mva1fQ

q/r4MqT8YfXcuyPIMl+B/R6VXlP2pI2+rzSdBjuzx0
pgsEzcy4KLSrQr6t11sZjdQa6l6TFpGh2VYxQvmmlH3XQU0N8eEttKsDSJwu1x9tkV6iAX/h5fhQCKnc

sK8XqIN35v4JbvnxOIWE/yFEuqg9aSozUnX63JG736YdYUDlipztjxiWf2SuMdL2uyfTNYxi3b8K8oxH

K0lvVgeMLMm1E3PJSrS8jFGdb8Ybtj90eBUdByRRhq
17h0xzrZtJLffzTg4JI0yOtcpTMTGxsR/b/OrPZTF7YJsb1pjCcQ+UYqCVq38V5taeWDJDhtq7lReSTM

ld11xsnzBxEEEXf4AVQ6t+C1/Zryt0MnBQbGhJ/9VNb82NX3zDjZP/OQ/1y+Hn5HkoP26TSWL3e4bkCB

O2fhDz3UtSAu7uq3d32vw9473cbTPI9a40v81iRb81
vkt8iRBpYofhcmiSA6DCt01UAT+nVthb7mRh5+VmtjblHehi5njV0LQNBzdHfQhop7uIDFJNW5Hk1P4N

fYhuJzmox8qdTsIigc00ZMHSgJ/l/wHKKfqvvbSqk7vjtF9D+jL2z4qZdPmz7GDZcxPWxYWUCjvqKKVR

AA/G0f2cG2gTUJm2LHtXp74Tx3z3QCGCflWnAtsNec
ZJTiC08cd1adcB1ngfewe/Q0CH9oFQ2F2c4QhaYiWTZC2wJidzrq4eTVOuhIcKXKW8kTia/3Ti/zpdIH

6X6MqI9eNNGsqZpBoCA7erLOlGI/9apV6tN/4Mzgz0KHkch78+wZ03tHXeNTe4olMz1NGi0Z04UlktsK

18pen0Pvm2pLdny3TCI//SRF/2ZApWJhNTrhM77Aqz
ZEawbt3KajCAebjaUZMpj8VkTMI01j1A1cxvD+d27IOqLRdNZQSTU7cXWRIJqE7d2yWkaHbKthPuyEO6

e88YHA/lAeT34503Er4LQZ/bSLAPReLHTIiXbTE0LNIm1dIsaMvC0caGShSeCiNE4HX3m44lTWBICwQ2

yTRmOiuAyt7CubPyyByuPJVFB+fxxb/Ks97suHCCg9
t7NoR5GuU1vkfp5SFCPXc+vNDhEEJyf/kPEbPyq1eXyClagKs4eP1EyK/YI4B/RJVZHfhKc2SC+XFzVW

Fn4BJMHWYPSBqsKkl5BQkV9JcNLWybCAldmnbljKJR0mS4d5XZK64RT0CqCBH0mS7d1ESqISB/d81sNp

oBWL0+mtdAP3PWLBARHQ0jcdGrq7w9sdln/AWm2F+g
gnTgSddIwFqZYxTCeL2YcA61cQJn0PQ5Hsyf88v5+HVXMewYobS8N+7mho8PkAeZnhH2oSuskjlR+BjR

/nBbDEBx81+biPhK/gZDnbEpu2hwZbWKHKkbTTfrePYjF5+d+dcrY87h5Yi+iMz5K32M8Yy/nHOOIugf

/DUIENr3QeZ+C1HkCDkRrYUs5zZb9okmTu74rlBRrI
GeEVRATUSBWtKBNLc7N5y9SIHQEWhRSRHyl1OVBH2Vo49nhCFQHf/o9wJNTX4388+e20n2xmqYLyW9kK

jDu6CsovJPLZGDPOVBep8kWaeFhrvd/RsQXN4eW0b0WB2jMH4llbaRTObVfk08F6gW/XIyQTnjA++Xpp

DqYW2sTq7TjUkTWUNWvU0uc943e0sWZE8tZZ3C/8cD
ix/g16DuRohtdt9qnhOe7ylgl/wIPZ+4VkxEgtJ6KBT1ti+V6ZULoMTGjrV7p2K+rE45IgSmTH8KutB+

LnTU0o2WL9AbC3469clUY32BmqMYKBWGZ6dJnw983W9+W8CfmS+5a493bQ3xvQqEm30zsdLliiJqBX2a

VVLa9Vdd6TpKdshwIti6PjjcnqeWgc63SYW1Di6NIT
2dcV8V5VYfocbVxX6rtjFm0ibyEtFc6HupTLdlma5/oDt6TcvaL8nxDn/M3bwuzJvXfg0HNzab29z4y5

QaN3wLuw1MzrmtPLFdPQ6YgPNcyl1VbpZxwEagFYHeBqQGkSP5J1SMOuZxsptWyeJtKqWBw4B8t+S0I3

fasG0IwyYzaiLiEq/q/TtlwDnhhUC8S4phWcK/nvOJ
btfSWhjT92GI4pbsWL3Umv5HPiUhJcQ+hlCqc0b+Qchj8f4mloxC9a+muBLwb5Pq90L3qAWA/yTWa6w/

4a7tt+MqFrz7w/+m6WkDA4kVm76BY2Mqz2+hGgA2UvEv7YIrf71f3GCGZgrHEN/4H9i2uHQLVg0FydNr

Mjh4gPQYMtDtxotFTv8FUOVSuNMcxU4qswBk/2mMC2
V7azAuDCYSJaKou4QWYDZeZJIukEYr+Uk8g4cubeyleWhA9lRp/Uy7bsv1MupI7npe6GIlenuViTQn0t

4zShB1A56Cz/xI8aRoLxhrun2+w0IrUEctQOCVh9hdJVf0NlJzP/CFMOF8J550mI3mbj4V56DNzf2ACw

NDpDm0M5b7RNuo2ADI01uC6iFDgIQkC9yGG7IsvhqI
zQDfa/d15xcq9r/BlyaUkfm9a0SAR1tsjHlrMqZcfn+fjc6UGRR3lkjzNIIISUCcST/WMoLewkxdBB7n

pydfcOX1UYmNRvY401OQ/MGdMsatUVjAYy4lS1hx80fzAmSP9J7hogSjFqYC7t5GuOql/VJA5iYAR160

0Il5A5KjjKLNSULT60DKyZ9zTkmsifwqmUQdtBG80N
PatC2bJSsWwauhMsyj06a01HUPr3/nRLnGgM8506wIvYqqTr1IkucfltGjeDhfbQmkJ6DIX60QdFnSje

ouEo4WmXlXBvmMmN+e2PdHH9Dub0oRSk+Z/5AUK85ZjM/9tACN7MChlelGCI51rpfQ4nM0iFZq89W30U

pXv5BhEtN+Iw/tD2jqDFLA4ORYdXjxf+8ij+JFTVOE
5FiXwsjc4xrjoiid82Sh2xMBbq1cwmiNSAEIsFQb07Wlz6Nf1wjTJKZb85HfqY2zt6urqUP1XDQMZ37P

3nBywD5nbiKk6XsJzklV3MKnGWoi6i+1eRr7stxbTU0G6Ns93UUabsqZ3ze8KmemkDMe2XTjFDki6FxY

R7fv10RsnpuY4ZXs49GpuktBrTQBqGe7rAN4X7vrTu
Dn9awNRZ/nnuy9rM1JCfEr/T5Rc510BVbYhEeclKbV9vR1cpJEkVQb4HIvffYX7IVYG5fHjCwfFGgZWu

4a8y4mW5PRpLY6fUDi0hvT/r5p4f8dbBX4ziR1yED6I+vEh48VpoTVx8b5ajmXk8uua8HXqlL1iY5Ukz

VOTBuN9FRVO3sKyFfL2aq4f3Jt9OgLVBTMp2pAvAMF
AQR0IzqfpLh2yjDDdyg8svDHMz+f3fUzfAahwHOSG0+ZWjTVmsEVUPvSVOUjT2woQYSCN655IRxKc/1U

SpZninHs8bK7IIqNiP0FrbUt0LGngZJeUtiJAgdy9v4dCpEtAgIkQYRkv7tyHHU5TrAnq/ZFoVxkjtEL

H30W1rvZa1rskBEq8BviXklM443+EQIg/wKepNl0OT
lw0shxV+ShCIFZCc3iHbBGaBC/sxGVtBfvHxfdj1vT6XVjMaWpBZXJN1g6qACnKm6QyhXSWIH0MtwZ+X

SyogcTgKMJdqRCva3n96wk1ihomh19CaC93fiB99LF662FWWdRDBD4uw778K1NYaQ7n7IQL6VgE7kK/y

FexBhARm/00AqF8/eMpOE8Rr7fiar8DoIJgLgeXZHW
g/CsZ5NvEZNoafIV9cF2YU/f8sN50BicrYGm+a9m5J+rgQbja6ipNsdUG1qa3Yii+E42LJ4/C3rpJaBy

QrYNA+FL7oBJHnu3fqrMbN5tE9puV+kw37c13TpfLmOMeZ77/uas3IxfPXaw7LnGzDT5Oe4Q92S81tTw

zRhFy7L1i2B5CI6idBhivd3MSGdLjAESL6Vrp0ULQr
zPAq4VBuWmTpZfKzSO6B6591YvLhN93dV1rhsQWkou1e1dUd5+iBdIIXTMaQL+2WL8Q7Fi4Gs5QlM+er

oFC0PalNM2T6cyalbgfOInpgju+m7GZtLtEniYZoP2R00KesA5SDDSW5K+Og3yhQqUQVDF3TcnsJm+U0

TG95bIbZM4lv/ZZgXoyaUBPmTXdh/1qIHUkwyyyPKU
KFCGg4dwFupnTMNxsYAoTjmMvEH34teksrqgASYqvik91J3AdBnluRf3+O8P80A/pJ7ck8gheaIx4X60

ZjKvrQfAzXdRI4cLs6NpdMhlIMxuf+6CbSchBvA+HRsIrXlbbxqWQnsYrqlB2xcREfDeumhoQseJLx4p

HK2LjY6h8G8aZ3Mcje5B8vxrVv6Jm5k4bejtw/86aj
JnzGj/ZkzazrjOMPac+llGJuBQkiZqL9xu4HHzgwRjE4+Vdc4CO5I8OqLFoZQlQWd2yLc/IabMdhHe7Q

sC+eOX7KQ7+6F4r6vkTJBmwR88E1/527WVEY0Z/nrGdyLxj+sAMP3TDmv2fEVzwIYBhwsiyoCPgkGq+l

kKU2UfRDiJC0EXlsvIqKwUI/PonfzKAqOzIQCApYV5
mSk65dDqgr38sUmdO3CmzreqgFUh6Q/syIQ+hPaXheLB5kLELBnIPNhhG3hyWsZjeHVTeFHYzIA+rLGQ

s7Ok0vDXwtB4M4rgXa+dhJ+ChHKLXVQ5XDd1G1Zd7os5BUPhUG50JRKJnOes3dF51cOs+KgxZVaXm+UT

lvdBtx+unXo2mit2oLnWsuYU8HNzegkvqyJ5rED/lP
94xVx4mA8nC/2/ehJq3MPKsTmQo/EKRVlCkiIc9+al36H9il4olFahOWtdS7VjgiBpw8O1lvRZHgzeWS

QSa9dt5UKkU9fkZ5JCunbQpqkMJ83wUaHlvPGiwBuQCsrUCYufr5q6BPhrk3D+ex8I73UVLKynCqby4D

TJevu45XeRVzXjUfNdE9xhPmPdk/Z5f5peOX6TFjA1
qOxgkUOMmVw/rJQpzpZVWmYp+J+GyFbgHfm+5ke8V/A1ZL7qR79mwH7zf6EQfk0OssjcCBwQ8I/9vdun

83nUeAvuuBNFqU+eOk1cM2yvzHk6OiU5R0UUOWGRlWcS9IRskrjKogqKjUDxu63EjLGrkqYFWUhhBI/3

yDRR+J2R6ZUzeIj5NPfDSW40jt9qEf7DCojwqvvW6Y
sXkzH9PO3BFt3IYI71F8j5U+0EZu+Kn2b8wMlkwknJYsvaD8NUZT9x4y3Q3LjW5pOgFgyKS/C99slsNr

ob/iKGMlfpnSi/TNmkrwx6zf9s8fdqri8qI00nq8lFY2713bRdYjZqGwwjXc2cv4eNTPMso/3WjdfnJz

DjagZX0EzqEVfIWlVh2n8lvWONP6vUyhjo4bPr9RqE
T5N27DwSQQqiCbtL4lBW0PAr9/wSgB+qI3cLj43jodkDKEgLTjeuhaB6S6X//nPdPhwy0ipekTxDZ+w0

YH8wDOxyUVjMzoFeeB0Y6ep/eeevZIPHymcl3gglZYb72FiGAzPeMsSmNprGrZKnA0y1w24RtoHtCnkQ

XlsnXJpaTUweC7EspjHsOLxiPuXzG0VaVz3xr4eB/0
dAlfxCmfpDzuwsJjjseUGNFgohXEDtFimZ0dv4l4gzb3AzTS2Qj8uZyhFpj2sO/p3aLokLroo9NDYEuy

HWJNUrpxRvtDUGOWpn6hqV99Y2/8gqVCqnX+3PSonE3bnkx8yMIQDZiebrN3Ifbr6fGYC7iiaLp4lLjc

sC363mqhykJZ2i9UKDQcSaklR6Rv0sMtp6CP6Ewc2B
Em0veC3a5yL3A+/ZCIJCeihyPRrwboXYZJyIA6oDaG0o05AjBUY8QUAt5bQMrq/64m68/kYuf/DD1JFN

ML8DA1g26Z95usSIHughUc7xv5u3IU/uhB/R9mY+Igy/ZhhYZZPHl3A5UtGyXnCh3Ftshy0s1NQLIE5+

xb2T0kdgTQ0I7fiptLE/h+sfFvOTjcq+yWpJViuaX2
KvguNyDJRs/pjkTlh5plfEBRe9akL//bw7P/e0f9LmvQK068J22HVo8xYYxDmF6x5YrvuIY1rBA99D0b

LOcm6y195EJ7ctvAO6Ba03/ZEww435RLFisVXrrHOvFBRoKDTMJ3h7apMNYqwAiggMmfq3igjaARO7e/

CNHVXkZsTBjrEoP1n2Pp+R01NkibmirS2CjFbNa4+v
Y800tVs+iGhaFQ05n35G6BxUNA3eMY+LmaQJSqeJUf7AENaI75XmlCdURYi2Xn/Jy1EYIQQjQRl45eR5

nxpKZ8dNrInUQoV8zTwicPzJmmRq489ImB+iYoEPFRnvj9lRZGa+I6O4tVn8qQgZfqxqcvDadZReU6Rt

hmjspwpaeyFsF5EnbeJwlQZF3Iy5KXndjWGJY8Mm0T
FGmf/zEmw2YCIOh6tA8Edi0u5IAY3YWEApFgpX7meUhcrfXQPAYCNlkA/pr3yidUdCe5ERpVgjLdWEPE

lEm6M9edsFfgbU+ZzXnqKksd0lXv6j9baAo4v3HkX4Jq3zxHF6ykLmkibdoJvDNAeYaUlzEZTxwHdqhZ

52JPO6uokQrQsFhe9S7Ot51GIZTW8q4I5GtKJIEAex
p5M+gTtupFnOkiYcQw4hCrFh7x0ybEh7TOKm4TcxuV80ggdxoT3rhe/bLwew1+w5pwaOaAY2QD7EmR+Q

cqpMd3g0qjo37oI/ASv4/7X2v0wjHfV4wyqLLsPwzMaqGbuyJTjjyce/0c1+vAM3j8hf9SDGxnjpihtH

E/GiDm241hUvxpSUG5+2254Y5KvvCkcuJb9Pzxwx2F
9dLqPKuZtCn19a1ItoOMcmrdF+3QOCHgIZsrYvEMPTpDqzaDDIGq/n2k6BW72ILgfsSF3+P7yP4mgUXe

LJbXmlpSUmSc4V2PsfocMlG4aN11dhPnlZ5ER/6XTjzGa85DbG8/GJqvZoL6O+DaGiM15oPU3hux0/Gt

w8W3Ug7rrNz3V/3M+V9sKCLgwbkFMvJ5qu6/Aj5MEr
2vkML1atT6tkJuyOQLHOSuoRx2jGxXDhYXRlwGm2gwvvyKwQgXMCqQtv+hpYxf6WygRGmG3ylFN1p/OI

bDVnbKciWBTPpY3cd9C4ETrGenVjHW57ZOhSp/Te3yQVbY9nzV1rAqpz84vDai/UWGiZ/5TsY71OMe6f

L8/xB/e3itaROuD4ed/Z3Co5UYNkTOwWlITMOhM83/
4rBH0lYiA726tbGpnRvryzUxiMb+rmSEhwYlkEDTmOxOY0vwCi35v6vyrGNURGpifqzwFCnFl1ngFT9X

OHav6PL9M2nMXlbTGSdYJJ1OduFWlJsufhmCt9SzczkeDW8wHu2Nye2rUBdujxceV+6UJlW/A44wxq1h

FtiSV/ap3RgSup8hpwKYmyQ3oFEBQfJr+oCzs5snkr
CSmyeHzu559M6MFUhjiAjW6vqXFTid6Ijjs83Fhk5CImzJCUGHY/XEkFf80Qew5PkhmYjA3IkUKrAX8s

p9EGvlzT0f5sCgxkfbbvI32KRQAHJ8F5Jt4rluLpL1omal/6Xqdvuyva1pottezfzg/SwrbaDkzHqV5C

ui0n0Q/gSHZlwASnMA9fl6RnY7t1r+T7El6vo4lKuw
bbxa9rpqPcYYXewG6Vn2ht3tEnCQ/xPDleieptTnRewvinDpCwFmpZfzC+741ZKloHgs23XqmaP6wR6P

Lr+itWoo4KZ3urozgmfKu5isP3Pea78qP42Y1LKWpLg0N4Cwo53W0irkjYUM2eCJ363h6rr1SL89kcvB

23k3V0a+Svi1y98NO2MX+mnfOPFTxChAOj8D/jNZfD
b/PlvwiK5yX/XalUTn+surinder+kaX0aUPoBkoTw6djsrf9lcq+bBtvho+u7F2t/QnlbJjU+TO3q/o75u265

Zfp8U/ElXaGOXdILqROwqsOKOEfHTLogdR0nXjMzvd7h/OtNqPWHsNp40+4XzlltgbxjHVP7so4pPQgz

Rn1v6dpp9C4K8l6KiWJ2eqDmfXNUp21EbxcaO0z45b
XO9Tfzlu16nzvnb0S2GRLBtKwr/JQNOwFpHsR1/ipRLAi3JMcJv0SKrgsmjfcJOMGcVmtBHYA0Vtv0jw

IgC/NhTQBix5g8Xkg2omXoxGN7tBgfHhsg7BlcFhemL3XjwzIJnkuxNhmoR2uY7JkGVHGrOSkl/PPxcj

7yFdv/R5kkXhyA4d/g3NCFXkYsuvTBmBhAxZjKnRTU
B15U6afs0ne/xnx6ygFvDHOsqoX79dya2M4AxByAFx2C4eWgCBcle+zm1nfoXL+mHLiU82uejs3tg/np

nCR8Nq1GeFsEhDGSaioCwtooG1+3IElPVz2gd019BfaCsykDByOyF7EdBPUE7C1/sqZkuLPKvU9cNWtK

WeeHTYF/zv+uGKjszvUY5Bi+Td8cSnBNpWFDeKH0nR
9kKNZNFgGulIfGS78ftWxpGO0oaaSxbv/WhNugOWGOFrdGJLf68NDPe75Tq8KCPrhHdbLIsAhT43Nw1B

GfRb2GvshavUOLpjXxNNa7RmyGJfNGJsNVuLnLzT46fl7/I0zsAnqsnuSvVCvC0lZm11oL79wVWN1eJL

lZqCdj+ZJZuZ97EG3H/SEIyDhbysAFjDyaWYT8pESe
AawRCH+nNpDhbrJthlvrRm/o1YrOZ2p1mkuik3ycQZBOPhxh+tDP3NWiXulpwbf1XK9YRJytzIBRKjLP

CZZWjRoPVlbSe5ujrx6/37s0NEA9/HTQ7QcujBAPlF/v0o9iqJyybMquDGqPj/hfxJ1IW0BM1cKvtdNC

OLcYaapgFcrZaqjTPXkd+Pu7pl8uaV5zVG4la+CsQH
shgkDSj6hhSzM2PQoiWrwDOh2ssnjJtqflnwNZhh0p4j5UdgxJPuEcXl5IbCa5vdsS4oW9VV675DKdA7

QzWTGGsFZ4shQ+AaSHQosHYML4GeOG11HIoZQmsA8+s4voHAksckbRbFhu2vAGgFoaSgwlQWYx9LGv8Y

ERH1kqP+Y4KmZ6AkKp8+kDhYv4KLV3q+mjmRspRxjv
NdDBRpUBWRK3oYli/Z6e79C3ziGv23bGSI5x+KXuDE2ZThJE7YHGyIu9dWKuldINIZTwQuRRDBEQeKNu

+HMiSBFpV25nyV8COKkrf2Zjvk47tGqiBm67UGJQizrKx2cpiJrHhJ4HEcK0R5SkMrJfSIIg219oGSUh

IEgT2Voa53nfmO/7zdUEnPHgWCMsl4nFxY5UdahVn3
F0oF5jBix3M0whVQHCx6iOUgqTLzwMMeOVLKjnatMldF9pLYlcz46XjM6vzgXkobmgFhyWN4lGTGRtkB

xhyjpHaMbDjVoz01CQs75eN35bRIchWlYkeVXNgQ+ToR6HwyQg179X/6Q00zoDAPoAQQJuf/6RZoCUWg

yqroajW0ptEahRfaL8jZGIT29v6VIPzy7LgdRcWa7B
J18yvkeX7KV+crdmJI5n/bq0wX5V+KU4BwP8JLcElI3id+z6w+Kn3+o8HK0ICR9aPDQlSVc8perJjL/U

/BBpPjsw92bT3hgC08vzxU08Dqjyip9kW2JATzrg6EcDo6xsPG6TzgUgronzZL1Exj48xfPTxAzmwYP7

KaoTAkNlk8SAoVwRfSISqYmDhtNNExHdc7UMWCeLZj
C1qsQA6izrVFuAUDFmNUJvveL8XoQrpDgGnH51xeGmvKDM46b8A5FNg1HljDjMym882iTgfbAWR+8uXP

KRGrdg7mz3n0t2gC1IfPrZfCz9Zc4ZYgQh+DRv2fb+8LyZLssPLBvj/Djr2Uw6JVHXq6ftC8AVmo3Fr2

boJHcnsHXPF5D1gZuZ2REarDZq5O9DJgYnfAFDjcrX
WDDtRKqVXrbcpGX63Ha24eYjroFFrK7AuHSfcTwP/8Tl70U7E2R0b2ima/sz8PMEgwQG5gcIcKEBSQOD

1SUwL60xiF+DbWjmw3AKE8n6yBb9ZYa0rBhwo/l+7vDJtNMfTme5MfW6QFMz3dmZHwGDcympQVSWy84N

uoT0vgiFke325pNNEhZzqX81Od+kvOGbqmUKA4wu7i
gShFNB5oXOjMNOIrnDe4crvpLsXmnZ9UMkP012JN3J7f2XZvXeV6GkjduIaKXJ1nZvco9UFYuq4wYGic

F3PmDVInYc1Yz4CS+K3yB7BW98SbdSMIFSm+Cp/Obm2G/w6S8Xg8/rQ/If1hvL3fLi6T5KP5TYDq+qZB

Sr3U9j5U+VVNaED1twwDnvQCo1WbTWlFx/jPAuORTr
DEphzWcGzM5s1RxRyCV74V6Rj/CVDTPeRMmkVp1ibBc9eFi3STm/bNaVG2/SFj9ba9RoS76vaZ4rm744

M6IIej47PHxPgizbKmi6ly76LgdW8c94vQmV4+vYptnksEkI3hx35ndqz3BnY+vBreNmjWGBvAYMcX92

fIXRW7kWTbF8U6Twt4cZzqZRRga+429rXvcg61hlcj
9u+UCadC/nj2SJNeZmar1D17Wa/+T95XHSPlHIepm1xGAZDiIZr7Q6rDpCa65i8P1i48zXumojz7caV8

YdDbj6WtvrdEVjy7TbeprkqvSQOBgpPZjVRz7K7+ro3Uiw8FuaOIV0VtM3mdbauILR+JJe77qPunBYpk

mVzLOR2pBbWmVQqkoROTR2evK17lImAh/QcTU8plAV
xwy0LN7e5U9CXK4/LxrWsJfcPSDeAS51gzdyD8ii2TBuQAcWeIlWoR7X5eZ1WJoUyvFZ5iz3mwWZ4nG/

QMCEyHSKhC8qGZS10afs+tccuTj9Q2L/UrYSXhvo7WSSnU9yaI6nm9U/bOTgOEA6Sau1wrBTikcdSWkj

xbLtbCaqaiX48RmmFpXHGG0LawsJ8hoaTxvj/+R49S
w09wuPFujsogOgvr6pJpbYDoMo2omuN2SOWbBaJ4UqEK0RJjExlbyqzw9knn+x4eSAD5fyZ2Sfa6Xla7

IiMJrs1fzKH1pY4+P/l959NGgsy5ROV3DNSCJSICSjRHw0zHaRqDZjtXVI9zZDgr44YyNGIplnuxFcDX

Wed4gmQWfaUqrGwJsHqlY7VaKrQwyfgmognv2n++ve
U+fsc/iOo9Ycf+0qMl5nx2pVKRoAuXhAUp/PQ69tpQy3GPJZlZDu9yeqAWgQ1V4rSOzO0Td8xN5pXI9e

tzCCBCQJD/4rw1qQBuUh/bUj4bicFJlbhCjMbNZUhGg6tMFQDPNQnmL/dTgkl5yIwKHgnbw0eTPFWY2/

AUpB+QQnjYzSpBrXWBjmkYrpexIWk4wP8E1g86+xqj
rDxtoq38sLn73AoudeXpyhUq8eClnQSmjLm87R5jt09vqVd5grW3m29wdfDHv4HXfONj/pfp/yXGvwrq

kYePe3a0X2SUKzOHXg+VeqNpWINSGAfvWS36uDujIbAhwgBfg3Hi5MghZuX4ghvXWuvcz85/BuFshNIQ

gEMLku1b4wUSArDMX8AP/1S8/82BYq08S4s5ftV1c9
79boc3tBCcIjXipN1QNa8wWgS4Eo/tFnqHPt6RddJbVXWuE4j4JL9MIlkymOnoquCOvbeYWFNaUXgjKN

KTie1ijpjPLje7ZEuWCGApxGPEuCsvfDj9+xdcQPw0FboxC1MnP/plpWsNmuubMVtUSrwTro0In46xY6

lxjJohydtpsHtpT9333iZ4wX0LOJ9zTUZUr4Wsu/3P
UXrt54wR6VPqQsW03J1hF5lWcRq/R8sM5g1bfpdUpu431B1YfHwctBtyVJfiYogIyoNZn3e3ZIw9JUYv

jRhNDlKZuy/G3dfJRNTOi+dDkKHi1owkxU3Gs9f/fCi69/Oo1SP+yuO3xVPm6lC41uZvgXfBhVCLTH74

69rKVGPdG6fFzB2iqSxzuu2jXfdf9ryC4N8Om3b1/Z
z3O032CKn/cZt7AlhHWiwh5MOx01JmJhax/XqPhwZdpycQiE/rX++GeaN3K6d6pSCXhQBbRM2qjR5Vbd

frCXg6+d7CXlN0WswbcJgEyqOA05P29r7DfqEcvqiU0YbS5MzJpDnRRd1uzSMwqpxJtoL0t2C/KsNhFJ

YjfJ6L0dsS1/VIOI8XZUecPIxNKDT+NxoognX4P8vc
nnxkT49DZhbkvhxmllCn8lsgfsIua6hdSZci5cCS4puL93vaWNoYM6AEolSz1BnQu+NAAiVpyU9GQqhM

GuLNnFR6mXCKXSMe7EkTJvOTChBxn15U25Yd8GFMlNpu5i9Ti+6uYOB20PuqKW/i1nHF0H/BlxnK5oWu

Hl7lUxF6mMemz2OhHHkbx73pywkw8FMRoE3hgB0+Up
AgMXhWEnn5Vy5WBSpp24k1OQ48fslkBI3lY9kGPFcFHnMtDnRRYeYkrhZ9IPF0se82TGDwZTS1u/K8LZ

JP1AuwPZdvKvjMOvUpq+zuudJPQniwPF5O0x1R2ZGXwHdFfx6+9alZobxoSts5+K+/Abv173f4J/N5L8

E+N9MU6sye+Ge3Bh+hdiGb2UK6ho2m1ebHQ8NBy7/g
Qiys56G3vDfePA4/cGnxNx2V/zhV43CU9YIsNsfcBiDJZDdWG7klB5Cjei7k8LUm/RZoIswf4/tFK2qM

DXRTplmGFVVurW3Gqrbg8uOpvXsUDY+LU83AS4G08qXiBivhl9Mc/1n2WKQohMEQypIcX7bDYI9OWliM

zizVtf7zws5v1LU3a0vI/Bw74+gptwi9Sxw8Q0GES7
3pcZ6+vnijCe/iQts8bYVe2HosuWa78rhM5ywRuZRPgv8Rnwt8U1qS6ev31TTRH38DCM3r6orp7evjIN

w1iLWj0mEK430N4dNki2P1EZIJ6T4SvRVNuaR/C7d9JcnEqUi2zQUfbn95pyHe7uuwm+QEt1K5vPfNXX

p362+eYThDgcNvBAwcIPQt3CAJcGlsJ4SjKp8xlXjG
ayaIPrqYJ+CLaft0/n4LT7J+mCVmWKu2st+lgAAbghWZUUX8Ho3cmhWFl9iNLKa6+J0nQC68loU/Fhrl

Fez2dniLjJJtIxUhwA+mS/aNpbcG8l4y0tW7T3P6vAdjJQttdRUHaAXf2f1rvoOsAkhXb7Hq6r6jPr26

ASDCphBWn8bfRxU8goSHPPfGYozuX46LfeO4JG4xSq
fNzh0pRbHCSolKzSX7K4Z3tPO4OgZQevHtcR9YYwGruY1QZHwc51l8rDxnc61XwisntOpipLYMBEPU1R

VLRfhMK0B/qbW3GGOXUIFyMizjr0QKo+K5tXU+I293ZxZp4B0qUu+RB8PLnhtl+KFs8hny5brw1nnXW0

6K4Rrvx6BP4taPJEYdolk2lZRgzAzkt7Ije7luTdJg
nSpglDlrBINxX5CVIulbOFXLQNnkv+QZQODUqcMRSW5ls4wGI8Nl3lGrmmvhq0382cWEv8GaTwq86EVc

QlNcdQhZZPMONeW6D949fDhKtel1yyEvSTBpg1aPmnq3lYADuCK9icQ4pzxFGkrK5Y6R3ubelqC5ydBy

GdayiW72bYLAMxCfrArq2tldMqR5oU9f4BZderZJif
u98MLlCm05WhkehGu88CL0H4C6MTTukDP/Advp4zk5TVIMM3gMK/15HVTR0R0rU1/prT4euH0O8qzkKd

Uwrq2xWGOTydOXFMpCVCdOXaxT6TF5IZB4bhwRykjsIQ9SV3F0NuxfHsgP+yweZw7ANCI+wVmKyndEuF

RbN2y/mcI1Yva+RL8kyGgWcnep6LIz4O7cY3D1idKx
dnpB9e99CBfcNA3R5egI4azfUtj0EJgrxc+Vmh6Cae83Wcn2Ba0QPCwERChz8kKM6KjpnZhd2J8W5uEm

HJ+sgrIeoh9WUO0a4PVmPohvpX3A0MUgahrQ1nqGnyEj3Cz1Wui+Yki7CHXq8I9llKNyi+25aDJ6jASI

IyFNjppD7qczcfYAuMBph+h150I8IxjeyQpRiIPK2A
YPWq9DO98stGXA8qMuVuPJoWDj0QwuaHun7UizRzplmNNJcbtkq3M9uinpYiob2wjm0lIaM9x6QylYZH

u5a8nF1IButOGp+59Fly9YGXlhN9JgpyO/46UcUdMnhR0lHQb5UrDjGm5UUZBnVMbTr2gJCM3xHetZmn

dv5kI6V++kmSsDytC2idR6o9Fn+7YbwILIAQum8IQB
6FlqIWH5XWRn+1EEkmIh1TXyF0zzWS+HuZOo19Kxb+6GsvjD3j2KCLbm4knqMlP/GX70xEh4uNKjWZff

eP7JMG6t4+0nCGeG9Zr78K2QA4RQPCIlZft3hktWTG5cX5toS2dlhPa/0AJW+fN9H4OwNiDjuf4duu3u

wMjzTpWz6fUj3p/+bWTZAShUo8s8DitRPUySgpzE5r
jFszPlsSJUm8HgFqlbdLVHYZk9jrcmZQ+bxHM8KkUwLBVJrL4LS4km164Bda8PeYloVDgRfjq8cB7EvT

tmLVSvEcQaStEa/VD5ofdpPgZP+wLS2c2/SEHeLcOuU+gh8N91fXFb85QydhL6Wdqng5mr6jqEkr2/5Z

lJvtka7W5LnY9yG2Zt2ZTf3H+xC1oawNNhsCutnPIv
08L01b9f/yaHYorK357AcREZA4UkFcKLcrkmxHXzhfOflA3ZtYgJ1nDgJM4JDtRlxGAx5pvsOgjKFA2d

0mLJqF4pD42fEr525UfHZneSGEV9NUuodfdoReMwGc4ndY+piYYCt5OK483T9KavQujKgTX4tufSF025

CaaAC64kO+GljDga4KcCsKekPkTNmtb3mCYrMlLCJp
sBiJvFznFMtVBvJ/GuRZ0Mofhv0c1evMeH14mmmwe+NOZvr8gWuJP3ptnwtGOg3pe1Zrekk2w8YEN9Q6

C16YUcO913bHPRrOmet+lQz7SakfxY7sLH4pXP3slj5Bjk1LKqwaC6xUIFVHgWr3PJmDsesuyEk76IS4

xNukI9K+6R4Ijatsx+bVanIU75thVsmX5ziv1t53p2
+ZesIeXosn/eZks/J0MGBEy8x4w95L+SRAwGDY4YQ0zOXLcBUsg2RrPyKa0XQ7z5lFjkpyrmwgkIpYeJ

Yj863yZkuDbgJJWJeRrmar/FsXQUSURjiRGBf2g355FO6lw/6c1gbg7zRdqRA4BAaiiwyGWBCt3xcMh8

Xz45P59ylV1XuuCy7y987hRmi/Wu7j/mLGWREQTwO1
g5CU0J5CknP47FrbC7QmhAnnumKSNKpTVbTDlrl0WbT9Dxa9o88QCP+LZlK/k/9GT3reE0N7w1j0T7Hp

hqXd6+T8Yl/AYXiPvOSwRUMhzov4xMp9WNMK7BrFF28haA572sRNLEh0SG/ok9Rs78RL/b0kLarATCR4

xbU1kgkiDAl+cFSw12VZ1BIv/SzMPeX2eQRYiKobYV
eL8C6RFQDtVuurT0+XGP9PGCev5xYja2UyPykYoV1paiiXXdaE+bXlKaWqWY3qpGN2+P66HO9N4qMK9J

05nU8nqydLq950K1FH/BYQwegJRVfOzd/3tPZ23jskrdHf8OifCJm3LyZ3csYHcl95/ICiF13z2jcO1C

e+txJ41sQ6P4/8mlLHkAwM7ThDtSQhSbaQLdRSoz1D
puNA8oF//hV0cLkb68byZYwDYFv2knSFQtj2GxRTTEhrZnkdPzObkBrguBdGDr0BSwm4VprkhnjEgRkQ

38O6OjRmME5rVgjjsi2D3qSHU1sK7byJj1Dte+9DAD39ejMqw+4L70c/w318l8zt22EkD4RGNZ8IIAS8

y6pF2vzEqSFlntPgWClg0aLpNzxeayInFds5iBoil3
j83dfN6j66LIkjJ49vWoxBQ+TXhQizO7LlyxNdLcVHQobivNpSOLylaG8xfQ6ELdyHma23fPWG9KHtEJ

adwxuubEGC1EC6aAlj6UkLRz0ilawIKgHhQEoe056QgEuoBgPuB27SYJcXAo1I4xctCklFDfPCgeQe9L

wVsCN9+ljXNT1Vjl8LQx1exRFUArOadMdoCAjVf7sd
n6MrMCxG/FW5GLX6+/b/BhMxxen+PGEs1+b6vDMenMSxnQ3CUOgfPXbpqUSdd34uNl68BxYMh/Yz+VdH

JCL3F+XGBI4tX0EYrw00YbYqjroV3nWKmUYj93h6U3LdXYX/b+p9qzitzjbfrHc4wdWy+23HbbdUbelR

KMPdZGJCSvltL4JrJBOk+7lG1TkbRpIOUpEaDS4Eq6
zHfDusdRMdlP7W9xlw1GTPN9EfrPhvfbwaWrNAO7tWul55kkrtoPwn+7hfdBNPvdq9v2E18gJmFHMiwz

m2fVgLdvhQypxyDmeC8+iD0IMJoDVPOYa8kJH43wsE2FRsP/vAlC46z9K/i0NxXOMjoNoBiL8n4s3PgK

2ix+6H+FohSsifyKAtNJp9hXkXnvuwztEjxA2DEvc8
/Ra469IzNvsLKTjhX6Qz+NA42h9L3Ok4DfwzWFyleiJX6nJL2sTun1oyI9T6/AOOsNudj3DIBt924nHL

iYJqBzyUOfhOKvN7CB75gXhcSeEP1cNxgvnwwnM67LwEPvYdQopixXJRSul3+IGc/5qo7ReJrK8OyIG1

cYSXkqcbi4bGl97Y8MGkMQt6BsD1AbRRfVoS6cbgyW
YCTithOo2vgByolyEGV8XDJbVpIj3kIPqI6cnsGeqa+eY7hxxFhBntnq291EqT/mApKXxoBFh3Q14uLa

CEdQw1tnQaqVbStnjBRoKTcn1nMNz9UUiwKg9POunBuxN9SmaRFZSeGlcR0cHmbvIIhiR+mbCgSL2Cjn

WoWqNmOAXAm3iGxtEg1X+BX0yw2oT9Fm/Y5i/v3w7J
EObfwMA+ti9l6EUApUYg/nnH8lDXY1jFnh6g7a5Ios0OiSv4jQCWe/CRCxjJpC6bMTAeUzNoGDB0Jsau

8qd3qEGnGW4vdcpGap1DBIB5y+rCDOp3ZPOqQLA0Mw+a3XZgkv5lSUOZ0ocnQxAt3UgjlP9xXGzDKppk

80rApdH5+I3QfRwwYAKvXjUXadtdUcU+kUDFWUkA6i
yX6cs8KPqd3PEAdS6/++T/L7M5if9+3njgrfhO5UIIXkXeGoKoF0Q+aZ1pbYaHr64XOoWQmyYn6AWgaL

XagyIcKZOCDM06L4GkUcnqeb+CKL57iLQTr3GBC3PyEPm4mLBcLYbT1b4Kxm6MIF6aLtjoi4k/t+0W0F

BDn3A4r/H2PN6IrklHuIaVm2We3p5xFsZqTVf9RY3+
RZBeC9nwLKC9Mesmi2INvcuFFRgar2UbUg5sZRTj2jw+oX5CJGlW0o+sHjfxPl8TH+aXcWb547aMAg/b

P3tZiETgbLdrs1LvDtxzoE5mQ3mGHPrWSxVVu2BhVF7wlq18O0u/Gv4u2DRzsXl6yoGr/R1y/ZLwwJWq

8GXvc8L56/pVEzcOe4IORwReynLI8Ujz/Xls8/K+5T
kB1azG8W4UfuxYbRvADu2psX9t54xrCE/w+2GLn24tBGzmkVccrYlnS4tai2ZKmzmKvyYyll1U7702c1

T+K9dVRm0+QeddrUeW9knqCWBA1i+lVY3RW18qq+Rt6uu+hJxJSoPpjROyEaneWLoB+CyjafcJ3SZORf

JEvw6EW91zv1fiKjbC0H5rlzFOwbocvQzzzLF1p+fI
5n0ZJ2aTIjN6C2v6leAStuHfrzmgv4/6eS9kLABY8pBb7/6zCsfJWLsY+KorERaaO41eQOaTFBi7zaR0

XiPrk36TWnI/6XH4ADmv2BCcp9mUnXF5qy9pF7Qflt2MSy+QjRpn2IhnTY99s6Y+Ve+qj8HpgN++WuI1

Ki8695x4iPem/qwqffabArdpGxUhUiFNeJeSsMGG4o
EV8k6RN2RnRpO2yvxn64yhwraE3QF6nKXJ8KrUdAbCa62//+uPPK/wimu08rXpWSV/5imngF1GhKWrCT

9Ol2bcrklpPk4CBbKd5II+LoUX+TsRc5+p/K8Eapv6Tetq+/wT0m79bioIuVy6n7Fkzn408y5R8PV0ey

4U4xiGO2nX2fbMES8lsEZVAgM7E+N2OIYSFwTuYhm1
0mVn559DZzkfszcPQvfa1VycPUuKAe5KnSt/GjSjaMjF5litUs3v/coi/WRfFKli09XSjpveh3c6D67T

kml5cDEGbqVJZfM5oVbBLlp6BtvOxxFXEoP9OMz3NfnRDfOyuyu0k/nnKOVOuD3c5oMFUpDZfdjzTl37

H54xTMYEo1TMTp2VtgCd3gOmFrfDfP+VB10NEXReDJ
S7q1/j4bz/banr2NFslIOgEZUWj8BiAA7vsEYvRGPfXc/NGjIYCpBA47VA382C7Od6PABZ+XPZgTQM7h

uj5np3G/p61IgwyE7CeYOpROW7KZpULDRf2h6Mc9lDF9fVfrhqDfQXGpU2hs7HXMl/YcJypadieqfNI8

SjeRyBXfr8IarZa/k5JWBhWCbci8/Inhkod4UWMJ8y
ndhJXNb5d0OQWT1MQ2TtELizzPaHm+rtDh7u25nOlLOCsawCUgKSPbwK/1Uw+NhPitfU4V9TgHQ98JMt

9ABTxf6BveMjF4kFy8Zek7NtMLS5cG8RiLgJUI9fK8dRgrreqjnoocgJt+WEQwmxoikMpxg6TtmI+bFp

2PdsGFiLrAtHNcKl+zM16IykXJa8xNYcGEuj1q+hW+
2UOdMvkneLeGaNXgj2gyY4mkStXqQxzesOnLoJ4+gCLEobVDaTgoYj3zLpWNKzGuSbFvNq0M0zgc5Slz

Cm47A2q8ZVujOy+a8lcSKzd92cmjfsYIIp0rK0+A6mYF8H4jTUL47/g4IpmvLtwqtSQ268UThj78QGnY

hAfdai20Q7pEfi9n6/y2si5zhtHZTFffL8luj2e8Eq
HsI9RRStfV7uTDG+TPCZ3+K/OMuTZwUehZBM60zNzy1yT+tIauIJaBk8EEw8PDOqqkx5wk22lJ/ksx5+

R40x7B/anQ4rRBk/J1mi27ur23yA4A6Yzg3L4QcjW9z0zZ6gw+43EPoy5hTJU7OA6mLVDuinsRuGF3Fg

FxY10OkTrbN7fRtW0hMzXyRXqpmz/3pU6k1wLFfbuh
pa4AHgKJ+BIr7eRsBQoExj4nVtn3k9bqQBSG1jsfBx9El57De4eqnQ94C7XtSHcs57bPKtVPKc4Nu6D2

ZQb61v5fuVhxD5nGTdNqPKPYtpHEsCPeqs3yF2EK7+Ghgh18uR4MYJfSiEmFf53lUcBs1HSOZn4nug5i

plOP/4nxAJtvwYhnHh85Nk6P+/lb6UO1T6vcGSBrkh
IXIVGf200yGUlT/wC03P9HBv33XiywlBBamM7hwf5U6B/pvaiBMJHJYKdTCOApHmxjuO0tNAIcHspraQ

qgxEhiVUzbsNa60XECfbJd7DgvDef5hJV8sG4v7/1sn8Z5jFEXw44igaOAI4gS8pjfl+CaVUjghUTmlW

gxGrhqBZ5IkIEU6mzu2uew/5Na04Zp/N0/ThFpDuLP
n19ewZK1lms0pMNDREo0QDejmG4RK+/aBRSwYQfCb+Vf/B1BV4MKtoapkVLMb1jBAcSJ4s56x3nsqwxM

sTr/c4oXioszt0zFOTcuyPeBn7BmeT5U9B9irTWAx5HSLY9OJaA3hbaoJCUlKjpqZwn7L5EJuuceT4eD

/cEEOXsdGFgTElC6j5C+0wxGiTF1axYJKLmZQolfqb
Lf5zT/K7fGkhhh9SEoK2KefDFtDjUq6vBegHVDv1kQL7AN87Py7eIvLuhTtf7n53OeU4B722+WFmZ4Br

Pn1+cv84u75skMs1aee+7m2xHMhzHCmP4ff4Qht/up3lh9QlAXZVmiQeiBQSdVdXXrx7eoknfjedctCs

l/si4QRHd0M4hWv+ifFm9qUOHIJsDSrNk/Wlp+4HSe
lpBwhsZrpOWloW9VoMFPteot34/VqvFihT1UN2BQuQOcfH/O6bqWs1ePBj7qjDgL+hVfz/d23v/w/+D/

7fgrH/1I4eqB0dC9kaKWWEuUAuIwyNOSQZVcf/4r9JDa3jkACBdcZk2vXkvcUsyuNF8v9DmGKDR4ittT

mHIb8skV3sd1We5xyxox+HBIkQiYUcckOILkW0Glv7
Fdf6Ncm1/aMaXspmFjtv7N5rBtNL+Iq1yHXMp3Ohhfc6REagz7rsj2tQfdHe83U9/Aq1X8mL+XBKAcZC

iiQemR25480+2ixI88QHsitacT2KSQ6MvbOqW5EpZKCbqhGPQvDPp1WB+KQ/KZQbkQV9ngBZXS3q+1n+

H5sv/tdRLzH+jNWNqXrXluzBQdFxT41lNCDzeCsrLE
AsCV48TXuq6sWlL2RTjvBfYaoTHlXPVB2ZxXzfsOUsnVDyBSekmhS3Ia2eez9Fi6edducZcmPK0rZ5mE

GJyQkCNRqa/w2nZVLsIpyGx2sSJKgXRoUWXbpiNLkxrOddGVeF37zj//31VCZ0e+iCBVb+U0SqAgSfdK

h23NDcoRbNXscXQMKVg3yVV0f84b4P/5y46WB41b/r
BCmBfCUZPxZAFgGml+RDq0CA6pm9EBIipsC0fiEH/9peSbvLMstPvze6epSi3TKoVfQqL0gdkg0PA0vo

/tBNBo52gWbvWn4nRWjpP0TPwGSPo/L+U9rxbLPMDw09GesFmzodpbbfRIN2Iv28Vw+ovglOcseXERZ/

l47sU7rZ0ycNL4u4rVpG8iymBCpeuvNkz5a/O8vvtY
aqGqHjUVLFnotfUbF4NdYxypTcr6EGqG8KM6NH//hyIf3iicXxTEZWVkQGDrL9p+bq2aevnqKjPI3tYB

6dk8D6ijDZleCplwJtASRoFZoDacAFuMCtcx0BYYrbLaspt7/Z8W/vfUbDb1CN+mBAAQ63lLP7xPhYNi

MAGWRu7ELaLpRF5NRHosh9IJw47n+ZlQVM/Tdfnf81
PHgRZoTb1mWlGZuZaJwPZsu8qrCiNPw3SyiFY8cnW18IJ9mn5b0kxDwyUQS8h7/oX1AvupOHF6NL29+2

8Uze1nIWzdEYNBBYaAcG0RXnt9RGo8J0JZp6RpE+tb8GH89nWUwqc9z7mtz3/ai1ba4tNOlaJYgtLpM+

Kw6m6Q2rkEA+1/bqAp7k0KyQ8rxZ/P3PL1xYvQlPLS
HhN9iz/PpqQYwKzrgO4pCcM8WYEbENyDTte0iumT43QjB0F3QZBbxt/fiRFTK0gnW4QBo6fsRbCJjRxZ

9+tekImw2f6Ns5hQX9tXxxfkT7YyzYv/tUmRadNcJfZhiTDyj8yGZECDKYUoUV+71jovI8w8JvjHo+dC

6R0Abg75hOZDavCY5vuh/4OlVOxRbg1o6UOdt3g/PI
7DYijdGCMdxcUjl9eXM8NXLaJ+bs99Zz9sNJYqaZvOqyaaW1bH/k0dAT/rk1yv9wSWx4BbxF8e1KgzLl

kPiNh5azqdpjD0a/2fbG+tLbeNeMs+qLuaz2f95QsvhxGyaRAcJ+CyGWv2VZUg2khqERffBPDhjF/CdL

fm1b1JCMyr3zdiuv0GcD0qDQuvS7iZ0l6AriGBJaTV
stQDjo+ZWVxeRsunLkqjpx9dd+W2oRbDCKZKS908QdWvYcn4Eum/U89n1kBEQHV22ZM0NzlrgU/WGL4p

4bf8JkwNMWNl+zgrD8w6/npzpy/X+tCRWVPJ6FIWDUgpvWeQpLdwoctFvkTnJLBZH+4AKh+3TesueRVc

vXw7qkZyYaIDOi5mlUo4fwaWkuwDFUfE0lyNV8lTC2
mOP47HMz8BJy/a+zvkLUYTpVQcLV0fdLfgKeOiQT48Sw/saVAfr31L2LN5aaFEX0cfLD9WWgm3ZDS55x

1aCtzVSBxBv9jUxXFr1ossxlOmn9yX6cIZvEBdIletVUb0Sk2ro+JGgT65XEJp18hUWG3p4xWDmCg8M7

BfGK/ZigZSyD7R2HGFe4p+FDsWaUV+UjjiKquxAmqz
t+BMao1FhsO+X8LinHIfzUkdrDT6iNVeN1WgHy3sqkHJQjM+dmoaUAEchXcUT9bb76zsNfbCiSc92YAS

XyigCq/N6qDUOlfmK+cm/4xL8wuxDuWbl7rAaMNucQHEIUrvC6wy+USU5vfsWXf/kkmEUCYdmyEdChLj

bUeceHZWxr/5eVhqaG9oKh3RabYotiwFdTni0ncvMg
QwIWwDdKenlnsOztiPWaC5to/fHppJCKlKIthbv9Nj/QI1OxIUQMIESVnN4mQfNgQk0ovf6kSZDU179P

H2+cqCvapPU7cN857V5VWclIYf+cWCsVNG7Ln2f7xJOhIJ3n/A3QV0fK3q8AwshOLgByZ230uKcynoKA

bSFm+jTLbWbfc/HNuZVbwE+SJCqU4cwYgvvogu8+PB
HoszYXChVfu/NMCZnvJsvd6/8Ed+r7n+ySHO001O03n+o3NfnFCmxZdE+AnWnvJih/0fNnOf5qSaXWds

cSFDOx2otAUDSl9XDN3/PfOonh3IBFSSCp+imcesTcqY5EBVAsfi2G3QrkF8i9Pn0BKr67QYeYeW4hkr

3qRMutnQnWnE7EB723qYJpDVWR6TBNswFNvqjLpcgF
LxCWqzF8wAwXgOcN6M20AjEznSGR2LdN8EB7Hd/OwUHimzAtGEy72xAxX5lRKseHm+UvV59hrzwychq0

wEzyyEjdiIaQjD2dhvngE39weSrlSIaTN7tFQvfugppGvAZ7pWQlxJmIMTzk9EXYWxuvowrrL2W9cgCT

y8xtdo+JvH9NmgehsRcVmBd89d8FJ1xbAquwhtgDo4
PGG0WpBwPoZBez+KsVM9mkG0g2HMKVezuiyJYjJqc8I+3jOh/jhQO7wDi6b735kDwsfXvCk+rbBrJBnr

HmfPHOc2t0DoiFbxP+Hv/mlVWO0OBCI5YN9HmHZx+5CIL4nadG+pH4nKuZeUAAZZmV7L/vDQr4SVzokH

4sDUilLjpJslJ+0QsysAxYXy4/WcCatF5KV2mjRpdg
pCYQ9f4jekIOhNrMZAE8mgy5WCtl/5uyRhAqEDgV9+GxWx5/ySbT9ecLRKbhK/LE7FoALvXIZvhV3tQp

0Zru1gdepg9iDFE4CsSgalIpWhe6j8b6kjaioKE1vZH7Dw+I/woOfDxCXSvcdmbE6j4xSQ143ZUfzo4d

qKUYJMZkdeSUpNC+2ZbwMeWAL1CqP7MF9pybD72RJL
Erk6brqncdn230qIW8durs0oMaTR/bUsk5FWZ0fs5JgxAG0dO3FaC9luPoTBMYcLtxHcOauNl+ShsDyH

GOAF5oF0+76o52uHS20LJ1R1exjj+7GRlec7A+nRhi1eizNZ0xKMAdT47nWbIdnF38f8xTs1hUzZX4+a

qZh52S+XknIl2TguWvlJ1GRTB3hZdmD7fDyCsRuRVx
J12wquEC6cdEX6el1/quCdoSjHcoLfTqdeR5JXJMDcM8m2MH7GxTPSxtw8mYesL+C2fOZfJ9f1JCB/ZU

R/V9tAAmtD36nWMU+mEjMfm6B75DaVjgP+Xop7KygR5rzz1/4rxyt+oAqti2oo5okiX+puh1uKi/s/KI

zzRcBNr7Yr/SsdVznMy5ohrfwgltGlpk3dCfmS2FJn
L6bPfFmgwBbAOSQ49Pux666IuOb0hGwFSCp8tz4aZRbwGyw47fzlYlW8FNVU9ZpOm9e0BrP0KpDbExVx

wkHg22EYGkFhKg6IgffegfUqYM+eWrKl9Cyd784i/U4aCtqrlv7nID63PNsrKXSzB5LA6iTMJKQvMnMZ

rHH4k+S1k0KOPBTX1g1Lm44sfZYM6to1UbUfxSkqGt
a0Hbw2yGk4voX+r0prZ1vD3Z7Tfk8en2T+rVURKPqwmxD4L1iHv/+cA1RekbVp9tMasw6xEBT4/hO3sW

Pc5aeQKUTaV0MVz/hDHK1T4m5ESWnPY5RsqnYBUjVuv6cw1JHqrB/46LrIJy8h4JoI9zLS6ONmmtcoZS

oZQfRpHGB9Ot0Ofgqc9SHVGLyda2+MjJ/T3/gPImAw
yqFnzUhR+Xkb10rV0o7a18XMfMGHnxqf8rZVuODvXhbfyjrAY/gUckqv4BtpdmvvLUeIrSUtko41XQ8A

6BTfAtwGBJzv/a90fXQ/Q7FY5YBFdaASiHcmYtRwnAzGK2Wd5MFCJdxysjcFCCp3YTkJw/hsOhdjy29O

+rxF1+2N8o+/IXViArTJFhpnjq/o5iqL1Ixa4upCe/
0v/6xu5eKnbVff4a9ftRO28RVlTlHQoKzinJfmGeg6jQ+zXP+74jun4OXocv3eV6mLueL4HtMDUH32xR

hSgOsjsChv4GrM5uUcFKudOBoQCicXM0pe23IaJCl5QNqmsQjDBs9pzZo3XNiXnXDPOG4MVIQ5FtLsfX

61AuZNGKwLlJpuwMMkOLyg7O3EaIempsV+0BgF4njm
tCizmoWdqzyM/bFN0EIoGHF0vq3OPC1RAYFcxxIEYf0WAH40Sz0mdIQ0cXdNaVKAjglt++EN0y3LS8h5

O5iO3M/xA839ePx2hiwb8Ir4AA7C3miqJjTO5rWnxgB+ttRxKFtMInLIjWQ7Hi9zfPsS/x6gdr2RVEWi

+10xUrdSWuCj03rtpGFdAFgh3hyONOldMjVCnzPv9D
GibCgwqKTsMIpphNB8ec4T5A6qF4a9uy1v8bbZZ0Dm+nqxXEHOU7tR/HSKlzqNzw+/QEM7bfwLCvk59I

/FR2JHvVLqB//iLQ6CT1iSoWwy3aZqIlOjE8CPPTibfQaEvLwYS1hS+a94lvhmzHRbTBW4GntcmyiiIW

WrUCr6xR7zAjCkNOs5y70OcdVFX4q+sCeT7LHsbNpz
Y8gpZ39s+PNNdE5hwCx4xcosY6mbYi5j93hiaKSXIXEVe6fLwa1iUGvOFJ/sx79Gq/Iw/QEqwYCL5pf2

oAK1FFMDEOK5gQ+hFOswFsPIfgCRkW+fWGbhsv1BPkwmLq1QmH0qryhauEYEX3JJdgHGpKfR8YKAopBJ

Klqzmcs5nA2EWooDyvYr3qvGHyR9f6Q6s1OzJZWTKq
rte2/0erbiNuKrhB6TNpKCaUzi9/3+pgOksZYPL4WnuUjc8ztzZpApM1bUcb2WbrNS8jEBjisN1zEq9A

WNBmXmcX611/+FELe6ySciQeOmc4bSBlX/DX6gKTB6Y8d+3sU6x9TtX/i020/MXAnFh7XulRgA4uJo2n

XLjWruCsB4K7uyUltLXeR76nM5XcX/Jp8ysQEH33Xl
TW7OdujGg7ddZQn4weyprHRWtzBVc1gWPfqE9G2/7c5GeK9iy4j4aPzR+0VT7LRpCWj2y4DSOj8y1n1j

oTridjZX5bPeVUAL7UnV5b/YF+CuBQQabaNB7/g3u8MR5wVV4EfslUPmtKYlM7GlauCrcMuNPjnm8BuA

7agrexGd0ba6NX41pzjAaN8s6L5owFUqyjhKvsd2SS
dwfndASlqHO8sxT4C3Nva8d189xuTgTu/45cpZXtp1Psk+3JdNOJqseURsFsGPHPQbhDQbSEiflTKVJa

3AUHZfuzA+UyU8c5GyoaJlwYIzNIhCB2MaM8ZgpMH3lHGloqxj1dj0G9YO8hKzA4SPRxHftFUuwtg017

cn9QqeCdBd5cJL/Z1Ao20gXLgDiQc1BU4+pdXg7BLE
4lRTPRCXuMw8JNP5Ay3do7l8O3vFBUrMmLM1ZbjvPrUjHTiX37qri9+7MPLPWofzMBI4F1fnuEisegb8

7co/N7vc+6dPDz+YL6lFlxOOIvCwyCo9RKVsNzX40uN2+LsifvA3zGp1g9P7bg9bWNk6jt5pEmQF4Rsv

csqWpjPFIEhbqohSSU6tKXbzINmnc4HZdVFaXjnHll
duxwvy6vJN1rgXh2NUl0p58XPC78WcIbY70aTh5nGftMkxA4yFjp41Ht6WbfLDHE2jW+kULEJu/QyZwi

ZeWOwd87vDPI8yvwtrJhDv/CPeiBUD978QOtHTNnmi3zIpqGwXTgDhe9wZt/FUsVvG08MgyMy/Kfnf48

CAejE7UpojmGjPcFJk2N9pz0mrbMF3eeRfbjtqsTJn
4i19OAE76Vvy+Zwu6SqIVZ56vmLKORYRabpfC0oLqoCd8u/dkaNzR5rZ4J6grugoXwg92ecXWVKHDa15

LNqbuGLL6L1kwjc/3PcCtYiU3Jv94gvpqgLdPHZHb1cWdWIumOmJ7euB77XxfQpf9YCPY3KGVwMQZlGr

Kk2PLQGo8zi7r03JlICpl2yIw+5vw3GGo1eWl18tAO
/cwSq0tVTEWh5go5fZnFXdxzOsKMBAIh4AzfXBboQuHFlCcRBHlRWkgHKnCE4zdyPpKH+C5Q6e9Ivmcr

0BHmM3WJygUqHf2zWvu2OkufK2O5RU31GMJ7ddn7OXeJhwOVXbh+lelfMMYpVlRhIhodiofUS3n4l3kq

TzDaE710NB14sOh8aqyvTzj7Jah4wLTaLWBBosaAWk
gpwX44FErqT64vnWNqdwbWVAQqQ575HzJDfayYYTr5ZXfFElcXWz68CB6AwssMXAUQRYm9wU+UnjQdCp

Surc72nTUmiR6fBXBQRSxo71shE53izEpdcNiBi+C7ceOCdz6ep2mlh2e6AUE0f7r2km+lb848f5/hU5

IE3/V4EGNqJN7uIEvJH+L9fW/vdLY+F4Kk0Dckh0g7
8gN8+tf4F3Uh3X2xfFQHjluWpWkvEAGcvz34AtPhCKtAdr2a6/baE2C5TRUGh4YrQXVu5JHvnGboThZM

ulrmXlV8PAvOlvFNXhaP0f+JD9MQgmqchUMkq7wzpER0SnhVo7rxpb15evq4EpMe30ZSynx1m9DAthdv

YmIrw5QCuQwbcUHlDq2UNsYiJ07H4fI9VmQ+VaUZ2P
3SjWwVUsiXbazQTXTaTX3OngbdOjs0PCU95tZ5nzKwF2szI0wTd53kobAoCGvam59Xdj7Qof4lRLoKPP

U56ChQpDGp5xuNt2yon5Fee6tLjX143eQWnXgdWcrD/3CD/MJ5Rroir4fMu+sELDFk8DtoP8xXLbIFk8

xrlMyGbTUmxUHhwqizLV8Til4ORhxWdHIHupasPJfi
7xSp8euq3zM+NUkMOQlsUVGNoMmYiMouXJ1oacWB/8RDfH5srLYmE/MAoRizyuyKFLs5e8BwPY1yH7iY

pkmyK7S6+D8W4jeyB5RgdA5IRrisNVp1P8QJ9O1XtKwWGaKXuD7XkKmo3x3yrzHGfuNBvseURPOTqHkV

UYqFuoOL6EeqOiXyeTxa7f1PaY+FK7kD0fhJyBdLmn
DYPvK7pwb0cKF6i3++8pSw+K1EfkR8ELvYq/9arczCNRvBb6cusYAHxTrSjXjvfY6sX848QnviRXJ5PK

BD74Le4HfJLogPxZBAUkrY8qFnuPoAyqoUa+yF0u3hrXSYqt+3mXFqGZXFCIqxxEV5WbCm67KycAkzPq

b0UNWHLu+xrI+pu7Yapd1OmPqXRt4lr0HuybfHBdh9
VhzZFz8zNCYmxhS78cklKJpGhUmAzEV2BAPIns07QxqQEY52BtXK0xuIhYw0K7HF54KYHgc40lfzjoBG

Hk3mfP9gEsK+WBNIOdNyZsW9hv3ajM68gij24MqJu8kygjKgSp7icXlT7xF1pFSoy5VET8mM8KRo+eH4

18VPs/dFa9XLoBPZhoNzgAZoeBdQg9WERJu6VuRRYG
FV/c95WRfwzA/6FSIzaaf9A14OmWLXm6L6gOSvZwlm37OkyplvWiIeifjswxBszhSO/zlpO2o9nZbwMi

0+Mq3nzxNQQ/uVrgB8UG/6qbPbBQn4BylY/nMAW5zHULp8Ch83pNZT/OVgOOSH+p03raMcijZ3Wo2QT3

HIixTCO/bjip9Tzf/97Auy32sYiFawc4Jfi1K0ylrE
ape/sb+pmDhZSyzkhrlyWxwBQ0OE5d12sxbK8Sh1XnWEUDaHVu4XFoYUsY8MdglRddY0bDquoc5y+WNN

+pE2Z7Yd2EiIAJ2aBPWDOHXh0uq5BZBDVvgevvLi8utpWhMvZpaYMqfkpRbPq640BDNbvWEHZz+PBCQS

AXv/Pk78vv9kPqaPKmq8T05hijUD2OUMFiQQ4qhN8u
y2rxcYE0T5n2KB/1OfgdfMaWXVPscYZMZkt8FvlnUpCNfq0ww+2uFC6O+cL19vxD7TrV5htZfwkDtQKv

R4TBiZsuiwzI5Fk7Q6G9lz7rqM+BPdBBT/tTrrDmb3IMhNVtWfqGtHOMo/kwOuymyYCqctCtyW2GCoup

KFORqecKCLRqFWqLxjJdhz3hosqIeX4OZvjg4+xSu+
NR3eHWU63/36KdLN+k5S5P4wjsN6hjeGrYXq/t0r8Mtf0uU3A4o56wp0q2ELE7o01SVCR/UGusEQ0DBq

EKUL+AVThEwklfRu4RSxq7PiqZUYL6ytEslYXjXfrXnvLbARI8UzPwCMqZSkXzWV2MIyI0LYgIlJbFno

ezcD6D77ZPnPAdWa7kuBOAz5praAsvHlZeS/TNeD0x
E0R8+6gVKXuEaN3X6m+rj2B8GZGM4XuFUpd6dxkspDDoeIP+cvSqDa8ef0e+cnCYfZhxUZ0QlvPCI601

uqOXuqBz8Tjuv30d9rAdxwbJ5ftw/Oi69WQNJ/fivZATJ3QUccXx9hsf8eNCGMFC6Xo3QDcMZL/NGREQ

Gwcucl2F/Dtr68tmXdG2P+EFiIHJypZsE50ZEGzcTM
6LUlMhyHsXSCn6FKqeI4K5RY9b4OB54HFaVkrhX1sF5Fp76XRsIPQVZTI/IGbkht/37n9JYkobuYrcdV

UoLrbT6ubXoSRAkEJB09iodkcn5vowzZA7oSCrNas6ibfy/t9uy+5sa6wenRRHipP0FVEClnEc0cS4BD

ctB3qsKupqjN5xgcSy42JvuvVErbRDSx1cYPBbTGA+
C8n94qsr+IQW/+pH7slAitB43OuWteXrQiOnckcoEM1WnaY5e0yulOx565lAJrKydXqyRNahIz44tKrS

cMLwNhSU/2GcTEoKldj6wTjvW/xqbIDN/fDTED9eEcPysnO+1PETqmfXPnY+iyce/7jQkO5+vqHJyHMg

xQkQEEjWWh6awCZU6MmY1ZfeAEVB7rPMgZkNtjTddB
/B/6lbzL4n5rYPAksoHcTlgjXd8LaO7gqA6i3Uo8Yfqx8ogiSIHNiGV2iIDasg9Pl/llyWCbX8jC9ngo

e9U8LU3+Jf2/43ulWCWVd/Xtcvhv76mjfItcf6JvInVdqA9PBDWyJ+5l+Xe67TfYjblE5M/8Xyo2O1cy

zMRqbANO6R6wCHznUgD7A30AZ6ORJJ/RW/Zb9GMi+z
bFNeZjButpbnzNe6lm5EmCb704MGqIY9jbcQXiUwOoXlPszVvv0i4D87LrTYm1VFzbJZxmO4PUpXCB/Y

6gsg8c39Oik7h428yHE4UQB/qcWHZOemkZ2QW2DD/gR1s2b2NjAnE1g7YuQtFReMNYqi24JmyqDd69ME

nc9ur1MkZaqNJeKgqzkNf/cAAaeyA4G2GgP0sDD/Sf
dRg0/a1rdmrlQ0Gl5OTMk3fA90PhwQldHGGeSdXkj6RaVJiVd779e9twyHRP+qkIc1JnRfN3+T8FC1n5

8Bcr7V9GRx1UZpwgkdft0STCijBfsHVGBXYFYdv07oAl8dj1vV+xeVSxYOKMkT/wltCpXOQPcZr1DECd

EHsPkL1v2hgZenftRs3WN0lQZMWiZw6eVcO4Latucj
KzApfv4kSRYRHgujD3lHYgPl0I4FLqF9Wc1oX7DQq7M7X/LrUl+Qf2c1kiySKCArJW/TEsTA6Q5Ul+LD

jM+rUXW7kp118Yz35sPTkIIavLq11Lp/uzWFpvM5N9kq005J3FadgJ+XB6P+4+1K7P2iiRrNMgLEAWmu

WbDLl30itktMy2yXV05T5+O7p6AO/Imr1sfVS8b15H
ETB/TjJ8yau3DM4/YNsET7gkGYSwlnRXzcXLqYt44zbB8irtAbFO0bdbwPzAmB1ZY22Q0Prxi7BWY+zq

bCNnOY5fTwlJ1DFIx38AjF0cMd5uexBAwXkSXbKRtqqYNlujPWt57x4lnZE/tL7P+hEMF0bgD9AYwHB3

eYU6yR4eUy7DaojqOsbwbBgS/2IcAGeQTIdBsHrZ8+
AksLyaWw19yA7ECLBKH0QPnhCzRN13+HIKwH7mtkOLVROQWKlOdKqOdZLvCq3+DqoZkokBTbHBozGd3f

95xVqpvtHAy8OvEmPMY5IomsbHhySYyF3nwkJzFswam2CjEDbq1fTNKz+dsrMS9vyf1Dy7B41njr3o+F

pUX9iZU6Sv62pMYkcNtAS4MV8YlFVML7RFRPTkxoJF
v35IIHuPd1pcGJLfYxBdK2QbP0WZVF7Hwv3qo71MwmrHswGdrjDZhs46IEI3IctOiTLWLGKzGn7vF74P

GctmAW2fyCkeVKbrnMsWuPG0sBLUQyuHMF6/qjl+bY3jVq0WEA2GI/gyeCOnszLghgV6zBfED7hx2MDu

+xxNlcfu0W35fW63cWC1Di25B+UdZlGDtTJ6ZPI0tN
uM9Byk/4eslgQc7+zj9VuLHa/IHSu7v1US3CRU925hTZSJ5joT0iHEQbqkJqeNFu2LDjyuCYpa3kVvvs

NzIidgqOETzqJf+NE8kqoBNTiNiMBXxp7qySvkQmporugq+Z28TO/IRQRXh1Cku7Kib0x7QuA5npIfuX

4xevjp7ad2EwMV76yBRZiiw7jCRz+nCHpBhQuT1Mi5
tlKPMscCCLZKJow/LZ6NfzmbBH923jA+EJeHsk4/hF+pzIj7t+wIc1j6kzua8u2gUzEIpgv7UiZHhPiI

t8ukDhdXxGJkGVvzBo9UG5R/i57ktKHsDoE8ITTr9D3o4jh3WHecMzmRmW8vVYcWj49xXR1OSIhLovcm

s5xXNJWdK+6INNvIEpBJv3zgrZa3iNRinWf+lGmNAq
aFzVWLy0zmBDUU9jT99Ak9IoHS5WuaICkPsRK/m3DGDMUk1ch2dR52trzDj+06Gi5lEIr1KLVvTyeeW6

ehYz9Ij/3f8NtvOpX+3Jmi89OQP2fqXoGycgAcruoQWUI2KUhYHaxfx7yPsWux49+4AUZ0ofRWRidG33

z8T/Gedjf3dRgn8So+nRhfh7cJ5aJdRVM4ZYmaxbPp
+0B0MRUnbt8UAzo+12PnPA2lemU4b6c7GaX2O6X620SmP1TyIgzbr9RaWJBy60SX2DLk23N8G2li13kX

3ELGy1aTCxf11u2Xnlmnlo+CUSQJn2E0n1YKwgQeySLjJqKA7iLi/PDIroF4vqpGkx/QY02wP/I7+4VE

aGnAi6/6ub3yveWssbl+17fV4bd5XcaoT+ZB+yzdZ9
/MrOQvbB0w/8pD+CJocy4HLBy10wi62WO18w4oMsCsf+HaXZkD6fGZ5akRUPNuqRF4VxNeYs7mgKwKpA

pof+L0f60yEwokq2ePbQ8aWIkuipLO/FBT+dY+3ZA+ZXX/E2OyGLT61N+uscvNq19U1zuyOT8ovzxXO6

Kq+jNCsOAcJGsUtOjKFbTofhFFvqkDzr6LDspUdjwe
DSG/Bgij1i9sQsYFbfQVvYtstT0Ba0/UwDwEPPhh1bHtTTim1KSwkjdRVnKzugXCZmqnTm7Ypxl8285L

nK1cfjw/jo0COJZgmQVB2WHL9n75Zu0TyXo8M6uy7XOb+bNxvrDdeL8eozMBR3ANUo9xUqoU0lmTH8/l

LwStmN3ZQnEs/XXyOAtxTOcm7HuCjZLQ+BGWF8/XcK
FujSDfi8ujqOzt+CWAOkyTifbhHZzbMVuj57qOrS3jMgrkI0uMOe5beb9MDXCNhsUD6nz2xGIsKFLdJS

qenNGVr9hibzX//in3De29Ogfyl4PhyGfHduaeGgeYlzkvrm0j+t6+d+j5yK0RQfaB/jeB6G/5+fQyHI

l+sm5tUGt31A4xHMFkVXndMcXFe+ww9yQryo3/86Fq
zq3jYSNck8p/HpOLhrGzi379H93AGr+4V2RLgSrsXrJPM9glIMlIW+aQcYdfAMIAoBHfS5axR7LoTWpi

muS9Xbs+Vygdw3V3uZg/zyFH+w5HO6DqI+4YJ47uK4LR+SDoQ/w8DhGpshaYn7unIfmiNOnmuJaMG4r7

ms5T0kgaWKDg3yhQdZHGItLkyXFMe0XvQXC71mTfd1
aHl4xO/1175U+bep1IZV2TYw+uIuKoaJ2ls+yKPoTQVloRFo/Qe2iB0/cI6sRyhcp5rHuJ+dElXUNGYO

FoIuhOE++9q7WTDzwuSpeSp1MGKTXS4rHVAsW9ah3xuDAREFw/zLZtu6bpTt7l5s1be7K3sTjoKK5LKV

V9CkF5CxcYk/CB4M08CDoQ/TsCcoXesUmKqoc+T6ap
l1HWWNqRSx09+PgjRxLrCRePatiIgxBKRHzis/ms90PfQUKnsslitxDoJW+8BqXF41nluIGcHC1mbJT/

hNz1/w/iLO61N9HhzGC5WFq6BRhZn9DTWY9rxxWBDUOOLaG28C1nuldfcOXUXT8LrvzGzSP+QL2twTBw

nmKBhHacdH2hDRFxGnFPiitBCe3Hxmq7yEaEGsBYLz
dJph7bWVOAGJhPnTxY0YY4QlvGcl5zSht4z+4mt4muOMsTtVjS019OdVWm0XuvKuvMbd27Dde2R9ps7n

hoVKbPGYSY4oKGxRwNq6NwAHgBUO0GBg76fvtqWC7VQs3y8tSeY0JYXKi0VkTGL+uHV0etTSfCmC+Gn7

JDIuJ7VCqtLHb+124DeGOX4XD76r88C0g5QxO8Q0vY
0qv4gy2cCf5IHukrSVhFshG7ddKaNteCTQO5e/K9KMpueNZVYz3IxRWJGSbqZu8vuK52nXFTKfGaPnKU

a1Q3tSb8gZtusun9+tSSAr/5Png8ZdxtIYswG/oh6IutVRhMoXW43MPip6ddBSDvWfKMgQ7P68iU6/E8

B6zrN1QARIYPnUhv7qP7L+vlnXDp9ulIjjSJzZpIp0
cRSm+NwoApJvaVIu3GiWyUmZaNjeTWqE1FgY4JxpFIEU6UgERyVOaYT48IBIMx3VhuvYX9OUopBXR+3G

7v1Owu7iykqnvnmFLcoxP+y3k+BCJ44OrnV/WpmRd3c/YOcOmfWvXCurLugvvpFC+MxtKpRoVX5xFa4y

gPR2kNE4ckLEcPg+XjZrQhO7cL7j5bw0DKY0Q38DFK
tXKZDE+Lp2Yt6XOeoB7WiSpGc9GU2z5gmqXnN9o9tv27GcAINa/qF0GFZdolR52o+WuA3YSl9i7yjn+0

kHAyChZLS6RBKgeQx/fogGwlunzCzLuAnelg5x+sX/nG/jjY9rzk0E9qIFCYi9Tf4dGlxRYHchb/t1J3

S8NM/32EK3P3RaAFpFLb41ksqW/84ZW4esh0DRN/YO
+xngUXeuvDPfdNH1QT9i1RXommENGuGxTEklev3IjMeGmoXZlzcgNmq6VVhoJwYqtv4Vr/H+HqrwyU8F

i2UFA5Nb9yDVX3HFs+8p9wlccySmw2rz0LPWm7w3IyvGwBgZm/80xyb7jQ7nWZNQh7Pip8TB6gCN4ZdW

ad6onzrj0g8D+kajR7rTEjyr+ADpe52/nmBNaEwhTd
6RK4XNuE+p+dOX7DGr5q2xTYBSgzjWgasvxZgXOStUylhI0cmfEVn01lCPxzktcmFoDcW9ZYUWxp3DTT

4PoZO29wj5vcUXx74EXHaezLDT8PcMWWLu2I1QJaxV9BUNVhRRx5pD/5m6cNhuNLJTq4uJZY8FANQg9N

oiBAg93zDIH4vFfaLKIpBVlZu5vzTRqG0RxkDeBHn3
u25Hl55twAdJZmSGMZAEwm95+PeCWcPb3bkznKQUUYzbAtvpC/iemozLylkFdUxoUm5Pt2l8eEb0nCeY

1HQOkJC5oE4g71MDfCtlyOYfsTT/B+/SBVjhP5fMMlp9MFVSBameXlv1ddoG2NLZNNcQcFiWx2OxfNik

l26RhX5LGAtqakiSwQwfOQP8y3u98xcxZWMih0OMSa
aOMuERstUtpvnj/gZp1vP7d1rW7ecWxgqgJKHnX1OuHp2ToEFFMTflngZGCFCa9BZ3B6NMukU066/j5y

YSdxIHhrzyUayVLcKt3Gh8rS5vIxOJ1JO7p3ykXLODwUOAgCYJjwtaButbHlnYp0a8mFpMykbBnp1vJD

nl70ONhuEbiy6wy/J1f0J62btO8anUrEP2D2mmMhK5
dzfVGa7Vh6xI4FIzp7aw6XyBzs8ks+iURUqVBO73I+Py1XPP7aehMdrftbEc1xNLRbO1fMS0K3nyrM64

XljE5lBvIn0t1HivE0lt+xyQSyCHu/LhN8bImdlTzuAfmJ9ZLHlCm0xcB0x9gD7E0oyoCgHsdCEyj4A1

KR5j3dFliz7ibIb7kMB3Y5jZtLWea8jnEYj5I6wkIu
jD7t4L+L85zHlDmrK3gW7Poj859aa9iXjAaN2TzBjnlGO2Qvv4Qhxg7rsew574u3sywKAbE/0GDSGqZt

yvsXx8yPI+VLu//TIFgHAJlgdj5nRkd6c8helPASBULV3kwXuUgd98kq0XcSJkOdCKEvjZtZwTxDsYRE

9mox34S8X1Mx85eml7q+tqcCQuRyypuyaMznFQzkd7
o/2mrPIkcxYSy5PM+8ipTNPn3S2gjwaUa0r8V4xy6KTzwKmWFzPsknmiXwncGuqRmGs5mwX240x2K69c

1MEhH681jA1GCfC8mip61LPdKB5+OwUgRtNExxVoLStDNwe9jCT0Y2VISN/yXUCfF1s8s4mf7DI7TMps

oJ9Kot5fXRvMs/pqzNNUirJylVeCtSo5xP/PCufv+d
2jR8g5/dIwCtLYm8rn46LqFjCP28IuAXc1Qn+G88zHIX8jnDsNgcq61I/rJWZH/oOyzKYtcannJi11JN

HvxV2jf29jQcN45f56Vu0gEMYuc4NVnwlPQsJvrAiTdlaj+aaeV5O7TgdPaLeVhJIous4gnosEKZ/rD3

v8Xtlrojkb+1o089RyYCa/M63YPqOqx8IzltNq4eTr
boqn4+Dtk0u4VDk5jVxFi7MG2jy7dg1Tc067IMeWswT060/5m9wol43+nmwXTUCY75KX4DpcCcJfXOqv

OBzuS4KFR/Wt/mbqnpQFIKYWoWWjsvuHUTej3R/ByqHvy86EqLSORETBy2MGSgjws4x84q2SgxZ0kiWV

NArTn7qH4TOW5lZhJOi7xFCHBSs5JUztQRoD1u51+V
CZOY8etLmpeRwi4q7+ZY0QUs4JRbz81+Pf6TqoEY5HZVw+BKL0ivZ6v5YMlvT0r+WpvdFPYP/FOiQFxp

r+/7kHEPByt+WWElK9K2m/fzI9M2MMZ5PDh6bgDqcwJdn30dMBBn2F4UCthr/IfzRD/xvJR8Fx/U0FZP

h7o73toRAwSs7IzO7frxtF2XpbcF/U4AJn6till7c5
PmdeL+vYrfA8NxNTYSw0uFQlaEC5Q4J0Sp7nj5IsX6T0f+3tFVcuuJ75ehbyJhvRiXwwc7gI5umcBIz/

ekimtyeYLYCitW/tZVV+RFJ88PA8fZNWjmLtO5YwbkBgJL8QTG2URu55GkHRk5o0lcvy9XHAY3g4lfF8

bZf1aJ6HleeRfzlaQevdX8N0Lhu2zLp4mr47nXTPxw
a6qOFma4ggzUpjEUdL3DXMqgxxU3HCsHnPTBcuUiKLQyh+hncruyfXsV/S8+0OFiI/aiMngQ2jJVzYon

9aiJ0j93vc6jchs4kdyCf2dqsvJlCf5+RZMzPGxVBhNMP9sS0xAGndj3Bil7rEEqG7Tg9t/oifnrd4KC

Q38cAWRm4AL3v8z7LiSTujRCFVq4UJdKge6zMdnoi0
/OpL0f71DT3y/lD9zmoocuSh5wdVgzJ4y1IByh1gky46D6NeyyoaNK4/n3Ym6V5PIQyBO/kom1qfPYJk

zRBVgXwBeapCDVuREl+ED04ppMS1bB92Adi5e7RgItKtT1rhqMj+2hIw5ZqeqBkbC//04ulnt1+UBmqg

Sw7Idkc60QOW7NYV6vUYyC4DP9YFG8HsZfhwpzpfVu
ic0yaAqkdscqwCpUspegayJWLm9kDsEoKY9Zdcexqnfjy9h7yTbj1tNKNV/eNp2faUIW13D7XW4Zjy9d

ZvsJ2A17/1dr2SNVWXNyLt2btO2GE+DgLw+WquftZEE1I/y9zXqO5S3BLAnUPVzcT1bdPmhNCMM3Sxsp

jJUpQi/QhCT/GAntZ3OvTgNiNOEceEWEXytrS2gkMg
3jnDRVAb4fwtlG1aacb8bLmEFlcQmx9DYAgKZRniYFkY6K1uJh8eu9QoE+HPcscm3ryg+gIEeyi248+T

5Gf22YU0Rb0lcPj7zspEf706pM5ZIcb7O82J9IJs1isoveHYLnaxxcSFdUSBqkpmM13CdWQGTXUQCL2M

SmeG+nD+rX0JJO9zDzY+1UheFAY9CBFAB6xy/rv8Co
iwVoatJt9uaUjxYg05OyGP5N9H59h0YwgTgyrcNgrhvwGW0bhwuWqdBkzS1guKbsnUEl8eCuI9BLmDmd

1HOd+oueUJZU6BKbQs5nRW8oPb3o40jlu7f5he2f+PkYLxoUl4oT8jsUiPw72vxw3kasj0jPIIf3Wo2q

+iHSmu/TUlUk1OgIj96TMxTBN6R4+DNgiE7pq3Bf8Z
C/7ysx5v1tut//9atqL0p9uWcHfsBZkiDXHKhC2dmB/03U/eMkryQ6ZlF6b1K2gX+GcqY4E5CH8X8cAA

JoYedLeulVphrgLWRhFPGgJTc3D5ouzcAfMsRkRnwfobOLzdo8tZIIcAZ3tsvI/uY09sIIu9gJErv6sM

vWoGk1sBrI+gR+eQU8NE0eykvFo5Cubqwztv0SMdYd
fjc3SbDzvBRfT1P3gJsWywcVGS06A7Ka9QCjZTjUDX/QM639EIAMQNQNbctt6MTrDYn/YRIiiz2M6HEx

ctxxQ/xde/ppqO1I262Fg4wbYqtQseLrDVhA5lty7l+373x+HYvh3OFdFJf8vZMLGlJb6pUAb/DmeaZ6

H6fKjv6EjFLKWApnfRw7j7PQyrJo4rbsOlG5EilBnW
wyp+Kjqzs+yI67mwKhipMBx6m59xHVVQFTcn5z1EL3zRjlfu2jvn/sJIAxCVGMBNPjkCcbokmqYnL3Pp

gIkw89y69sbnsAz1Br+/b6a7YsWE6muqtd+73UjWRlfm9qFhz4BKaNdnzsELC1EibKdtsycg2zCJ5bFd

brsCF1GgrXHBKgya2JPNhMvJc0U7kX0tBgtnqvjaT9
Mm6AC2P5FCQl/iDgs6jrIQfZ1hyzlYyPpw3dSe0xsY3ZIqm1T7HF3EywxY8MG6AnEC8pNCRw3L6Ybz/8

JuaPWT4iAopWpDMV5wutk2VbGibpyuMRBHncpMPuhDCHRlivDr3xd9IIdoRgNE+NBcjBbE+wo3Wvrdu8

z8oAs0w94XrqEqOHUZAm2aL45mOQcb11xmbjt0kDDl
3Jss45kugiAlW1vUPdZIlfmv42rqUZrbFJpAbe13vmmK0sTFn5kF4v5XyaJxfp+o9yjOyxds5lb6ilB1

madrNJHOoCQnqwUFuupfHtIFCGAFBSH960HeMvCPrT0RxhYe40TanG6Yf/XNJpw4RDKrBsuOoXdQw7L4

dnnHr9OrkGkyP00Eu1mJvyXPmltVG65qbn0n0G2r+I
bWCBColWb6BjtvuSBDf3XH792jI4/MCeqUBJIDVjeB20U1CT/jXhCSwVORWes1rWeRj5emu4kOZWrETK

um3ClDc/7qiwPDxiFoUsfHj+Yw/9ws6zYB9aD5ze1XtTKrH08XSRyrl5wSgETfSEYU6LOsakB0uv4Qs9

cxyLaSOHPsCU4gY3gecE3uQiOVoaUvoc5TlGhfJiFS
J2rmJYLGTQ8/Wu0zzuB4O2VJFwXqefNL+Ip/cYo5bZCtUj7Xv+Fn1Oz8d0WOoGvnpg7kdPkrrB37Uw6D

ic//YtJuJ/mOtQ+5R2CnkEhlsZ6hf6Sm3y+ld58lk7VSuYNtUF897bSSvYAmiXeOWGcMQgqDVaY+Dsxr

2gY+7ps9xDcijTP/vlW2nyWZJw1w//Aqz7H7kIJMBx
moRBJ4RsiXaetLLNd/q8GPmVJm7Ri26fsiIrfykUuGtmv/dUxmg8mareaK54o4GN7dHv/XVVWNUx96uV

HBhzoRI6XuOSUeK/sgILUHaRN126HMjj0ocFxLWn2Q8QCt/GMnrFbeWfhHvkNGkd8JFGVYeolz24Y328

PbuMcPa4CkvDPGSedKFL75a3Uu5BBwpZWaYiy2qq18
B8F/oqbN5K8n24QqTPL4HI0HLZ7zuTb4XRutNqObrMtW0e26lTXeydJyuRK/mIvvezIkWNtgXteTYzsI

/BilL0rZWlmUAlB17kcf88ObeV/Vax//h2lZe+X9jckiUN+QoN0ScAxU0ytN510mb0IRxpbJpXkSvlHr

GB7K6mpXa2pQJD5rOPXiPvbrjWije7ZgYua5o/ezb7
UCzBre1yjBoWzs86JOtjHaGsEq2jU0jNZwixDD2EDyPW8kVFywPqU4amO9jcKSRZA2Qr6PYaOtGSRShi

tVU2Jt80MqEqj7LalCQHmnGMMYwakXvGdw/k5V55jPDDDGe0SOuKE3senazrOeZrNnGnohCgdyQYCBQG

As43noEAQmP7L5Kz6IKRUuvuVJRIZOU8X/lnFCab9H
+WKWSp3bTJqhIm3FacYh4CLXfb0kjjpMq1MtxqKBnGO5aD59CyxuMgsxWYeR2GJSvtwJoeYCVdxNSu/r

6ZqTOv4dpskHq8H3CO9of5uYdKGlSKgHsSFVDBK+K3tNMSd33qaDW1qMQ3M/XeqK/TnDnw4QNKJkDJi+

sP0BbI9hZU7xvpdIj4k9Rzk79Ag5x3XvEErnwJmDGo
N5DXsaX9UXxEWKP+yT5kQaNcrIko6iHgTpv3eAoQsd8zO3iqcgHT7iTx+ddB629kgUzXsqUVhUSjUrI+

DhK6AHb/MnnJQPWvU+MaiWn2xQJR83rTEJWyamGJs1rCQMfwVDUboi5DKS/J3/C6WRDKty4Onu2zuLME

wRpmPTbqTbbVqN+4p6ecL6l9hwXZJDgN+TxyNjCu41
9CuTuTPC7NbMbZvM9adNup10DbRX8D7WTqwXZxa/dByfNhnSTLwFmmFyAchG+t6POIjhvKAyBCZp2W9J

459vzQe5r1qnvXgWlOJxQC+mQXpjwjDTw/Wi8jj+CpA6oPsmj8wUcC6hoo1KieEz3C+n6MuNwvH9cL11

ucljRHPv4GhC64vE9tUORXsah/ojOLU0gcZ8XT38pZ
aNtj5oBF1AckGo9Y4F7IZpvxRMtqGy8lcNEOHNdJoo//KNcBO4HLvrgPv1bih06+7lpe1Pye9BJ8feM+

bLB7M8rzUGn3mIyySsRdTZ+tVEGFukjRfz29hWV+qiiW5Bbdo8yLban7MKjA95Iyeke6AlTIKf6Teqke

DhBtz7qxBmRd6jP2NFUyhFappkHbBJ7qicM7ZviaRm
INudeHLzv3pNjr1BkrNf52Be6NYfHfFIhmGDm5chav55qnoHSIJOdIhSbXVpSTfOWkR61xYb/S4puqhs

9TWqDcWLfqLTpIteYs07kHfMHsf0riE7DP49KZKG1iRd0w5VZTlcn22CEWZz94qwd/c0g+AfxmzHmg4L

mC4Oqmpc/53qqOQK4yz0TuUa6sM3fABOMc/fVDeOo8
NTSCzW4LoVNnV/s/JTq8NF6q6A7qTQOloiLPHEE5+uDWuvg353TFpc1ki8I+Qq6Gz1ltk/oIyCCdvNXA

2A8aLUHMSM0kV1WRlH22SQyPBjs2x8hw7FgfZO9hule6SZHQofY2sGKEYvL9KMiR6ESm5bYh3X7bwVA6

SWq5Xi8KQpWrpVMpv2WRnqw5e891sIhWQ3k6mE7JsI
FM146sBfB2P+ILDmCks8qOmHl5aFmk/KZM1DbvWDosV0Iw8CFOJ2d2tlUyV867zhfe8crdzxS5+egpEP

8nzRybzfxjcsV0gPil4S0BSyGoPchQyVsMb5kvE3fViARnhqid1xwRXfn5QEb4yiEjXtu7tnvgdUGN+8

my8pQYaXkZnef0YE7AtlvSBjKzsZmE1Itk0S6nVd9h
ApMzQ0JdTQ3e8JPquazRva1oqsda5BTKKXYMCpKI9F/O7QMm5C7s/5yM0A4kQhv4nXk4/1s8PnFgieiO

uywZq9jzh1f+9Ek+xi/EOfzHQrxrwNv3rpsPUCTPKZyMuxvL2I2zYAN2gvOWsgv7IxPlLLCq1jfhr6hY

cGgEkOBdXHn65Q46ztzTOSGD7xCpj+R5GCT5BN68ZF
ttylvIfxICpa6KTAza5qR4kdw/Il7mpUbxlG2h1XWLOiQHSCE++SuEeKyOMt7cXQNsnz97iIIw0iW5x1

am7RWKtlwqFAN1han8FSkOIsJ58f51kg4Mswu+MbaC9BexMLyuINFGrvO+Lm37f6cFlcTK3VG7+ppub+

wnvVBWUiJLzhQ9uNK6OEvQA1Q+zGlQKVjr0LA8ZqUA
arcy1S1a9M685XPnGeALKQ5/NIJX5oqyjC1xQtGkqSJWgbJEyO3aZ2D703Gsx4+Q59ggGIuA0RXUbWJK

xxqMfgnGDJZRpUnX7NtcaOsf2jscxRKapILUvRNAwzgJgicP6xOIvU5q4o+PLb2W0AvSsDv0ocUhaaL1

OXy3ubbzL6Zihcn4+d/EiPekRyQylYythdfsHz5JaW
R8QwiC8n1jLaRpFXZgacdjsicYpAaSaMPsrNzqqEsoiDGE4rEK3udn+ereo5UqMFYxRYUlL09hlyH4yW

xFMQQmekaE4rbU1xZizMa5Ce35ZzMmDKS2ZEMH1X/w85DNCO9TfWQhtHdE6BZuMSKM9d+NoEA/9+XYZz

0Yocfxz8U6351/5EkanQi+AhBuevyR3Rc0+A+NuOTY
BkPGJHf2D7zU/7ZFfID/1eyJQsz+NLTrm0QexmLkQeJ6s/JKCpedieaJDRbIo70qAeZvpxbua7ZM1gX1

Rv36GWqJkudNXJMHc9C62w6Nlt0JF6ae5ggjeTugo9jR3lAdreSeGlk1NbP8td/oMaVeBhxe3bqE2zwI

fPvLfmhtXjdNuYW0fWO8darci5xKF3I7ju5gMAmAKm
kaZE1JVagTL+YPKlI+amMrbd5Um3zVC+Dm/wlgNWNU2awILsgl5AK7hx3Lk7r6v36sTaEBp9L/dn5Dry

mwUMfB278vjbNadqN5WTpiHrZvUk9nnSTuPublHvTxlIqHS0mTWX/u75L3Z9ws1HJ1CQ8uOJ/bNR7Di2

wpl5IVMnfgwGvv9I2bgOn/1tlx7ZIQ38lAoR+trPch
1ToFhPBwTySX0mPFbl5+VMkl9Khqd2pRiDlcqeoU90bBF28YpIii1Pw5s45InwHDNcXIhNlfxQA8qw/M

XCblwEP+Gu0t+D85ALgpUkT5BqWyA4chWtZ4jlerI6NmZJCKLZQjYTYRlzyz616Iv20kjm5CtTjqZEd+

4+EpULacTCFua4DvoX3bc4I/vRtymu0SEAuoNd27qI
qIA3y10B46B6kKzslGcXAktGzzsrPCk9gSgGMgwH3+MaycNSYTdcRdbHArWxrS01EoTkcscNaFp9kCxk

RXR99FvQXPntcSRGosD0S47MKxm/NHQG3/qQe+smuv03qdfJTLgojpfAhgo4NPXy6othLC9b0WN5n57a

zZDCBcsxqB3W6xM4YMS65m0wFP0VUDpfO97RRbRWig
zHz81NTWgnsXZV7RDCOtsBR4HAJnn0o+AWxAX3B3ZuM7CcvxMT0vl/iI8O2bxk4c06YdBSsOX1yRUOly

lqtZFRE+nIigkqNprJ7d3j1lN3d4soX0Kn/9YVDg75Ca8+/H/viAS9n2MBP2kGxFeHwx/+YhZj0D9Cjp

e0lbY1wJkqxdtZMWpEPmSzz07jpDyaUX0WnHegLipm
3ntMpZAI2W5KsNnaa6NMrpiwfpoU96t5tj01IT70siv9An+ohwNZI9TGnks8tefUBS5Tl6lq2hq6TWDN

7EaIIYgh4IRsHRyqYp9me9tuQS2UH5zqxQ5tOWR8UnotVkn0brf17bB2cjjRz/tfhr4sa5qALcXzxfxP

WT8qN4kven4TxTJM2usaxKLzClbYMlutEZreyCXEld
inN4l/ELySd9yskrHApKOuuUqhnKKFMpL1a77g4TyJdVt8rSAZxcPAdiDeZ7ZIVqmnS/8KVUjwizVT8k

YRROl/cp2mXhJECM5fjDC5Ylt8y+nURv9KStM19SiT5wlryYonjjXM9JaF8HApKKGGppIy6mIo3F0utN

b1jJGJQsisAhojRWG2sbzjoOT1ub78ArpV56CqP7SS
wzKKt4lel1XBRk3tNdZgR9mJ+AuCN0On0vT4R7wnEivEDdpnk0Udr+0y16GlMyXoglCTnX7JfPmHdjCC

BsxDiykEu7iju0zXRp1r0LrimByJwZwv4PKunb75AsuW9vmObHdK7IUGDjA9rz8geG4gknAwXsBe+MPO

1JP4NClzIYbM+mgJIp5kEZOG4MRhFQgYbkdWj/7GGD
rhL89ARVDxv8tOirtz7K9jnNEMAW72mZiAL8SMTMT5gyeLFNITnszrfVZqStZJlNsgD5sPSV5daJ9/TX

f7GqLpknOxKDJx0PPMfXU3myU7mRt0Xw9gB9RzRa7I43b/7zqSC9gUr79ZCVznOYhZnaUBg5r0Mpwc1T

yM6E9rLAqMX7GjAgAD9P6Fs90v31q1iUnujBHO4ky6
3YeVSCHpoE/L8lz0pxkrYcrY7DzEq8C5cEwU9rcZQsSkeWbDgy1AXt7EHGwbjewedKpxb+wEqmjQzbiB

u7QuymZkOEO2o/rJXTrmSgDUWgi43+8PKcfgfMeydISy/nlXOea3OcwpNaNEDt2Tb7xudV/grO98x8UK

9NljuiLpZqHK5QeKrYICcbrEjWqw5OEapwHzFEKdr/
oDetTyh8kxr0pmE4PE//yZldukBY54Rrao0qsC3v5cF2l3ape1BgcVyV6GMFppeKPWDDwwkEqV8+Elj0

rOFpPu+m2TTil0DXhax1Y+VOhHomKqYGtwJgN3Gq7p8dQPjtqRxQhQ6vch/iguyncB39zxq7fv87X/nw

xhIhmyPkjEugPQyyBWhnowKHp40CDXNuZ/Lp5vhsbv
JhIDCdsl+2R/zOxj1RAW94oPcwsxPohMdq62c5IGgfRW/1XyPVbLm6MfDoV1gW7XVvlmjErqLvIXnA9n

gc4kUtYPyiZP3WAIgkLpXlOb3rxI8/aSnhVaDpQlUJzIn95hrrKsnXaUv1jLQ3Lh9pYZbqxYptunYpXn

RLhZDinnzoVu3VYWiBkYzKBHDZWw4FjkAFxCwVcpL1
SX0pWlB2qSt9gq5D5xOCEUa09qBXIhri3pCh2zhdkMbXpx6gghvxIl2XOiJpWb9zR+KdrV6wsDhtPLZN

s85vke/0d0bMFC/STfv3Kbvf4YqaPut4OEB8LC8uSZY6NjiKSd9MKCot0sFE0qGUv8JW2qsscJ865eKJ

YfB0mbzU88ibiyXP6iAJZBv1L1SmEZL2T+7CCq3pI8
fgtwZONSrULzULtQPtMpIOG5DvrMVZtqWZkq5eWGvQhjWzSiVMwu6fxB4uX2OkWJ2v13g34NCXIPR70b

tVH2n5+ZTS2J2x46cxddv5KrSezlke/Wd9UuGhRK+shluLCAA1jzQplaWHIvheAdS4TWXf3+n4B2H5bJ

LpwH57Y6Im6HslKnorbNJXwIJGq+xI8TKNiIq0NmiN
Jz+lHGGOP+4rnZaWeQUmJk4wShIpNr12pEwSJrQZlmaHWYvmv8pj0Ue5YcV/CMaJLkXnudOwW8eaxV3g

otGpnS1nUqtbf6cwPzCFWyt8SlohXjT8y9dlc5Fn2YQXr5PgQfuc29dogTNatBdQ5xUy9XcMCk/2dQ5c

3Wddxqq35rH5pjZps92P5j/o+ZkA2Q7reAGiNiE51M
Yzgc022aAK+iuftkCMB41B+p8+7ac4bVO+NKorBEIiLbA3Se3UoydRSo6Hu4mmbkF8FKqMFpjoi1c4hH

rSL6V5wr3okcJxG/pTABPHDof6Uc8vv2V5vLKaz6uZs6+TnQBnF3w2ja/GJNspvJoISXtI4wg26gmUni

rkqChP/mO0O8v4b97XkPEpKJ/UI8g82yY5ntJXDiuw
M2FX3dzqrTGZA+D5Fs8VZS544welvnHbUW/NcFpDL3vBnyDPDE0w3cqt6hRWe4ihlPnAHMxxGDuuCjGD

6QfsrDV/IUYyd0U05BENMLtDoDN60+j4MTkZRpctfmK0xsW8f4X0VZ+VdIM3hT0Q7YWpt40PrMfYukE2

3MshL9nydm+XCF0oVj1gzptqZ5CgwcGVtx21xT/3vz
Y0tFPdkXQUUvdz4NSXHXtZjYViXy5tuBACeTos8yBkhWsPLKQGy4zY9vdCbhRsHylAPAmcfgJf2mX1Dt

54To/SIshLo6gXuWU/RhRZWabIbxTTKj3FRHJIr4P2HSxPr3gmx9vMakTl+AtHlCwytfLYRyxmb6542w

KkXr9AhWs6XCttDKrBpyBoZftVjerWvRjVW0siKKGS
xn+3L1U9CRzDuf/6Bs+t5cISgfs9wU6k5kR7sug8hqD9vh+H9uuuR9fJOR7/m6DObGU9iwR7pl5+Nd0b

2cfY/bFBk7EQkvEXNVvEfImfwrarE5e931gSn5Pn0Ed9CB6H4JfW9+xycW/Le3X7JhxDYrOOwtJAoRKf

Iye9G/l15q9VNY5ouHwTZpCW6uKKToV5q7YCtEajcH
giq1J2+uLWGQAUcw97zX6CJEYrwmS4uTRz/gz+pZaNQiSZooKDHDgmb60pEctJ0dwcrgoKlNUELLeGsb

TamDEqwx+hqDhRQx3r2sOcWltrNXE52a8bICvplI9bJivwJ9Ju2i0QqlQBV88NhKA+u2gHM3rCW4nxH0

O4eYwA9llPkhHKAg3NgIawGHtBU0ag7m5ADfiR0dWG
6nrgdByRMR6FktXbkr8hfxBxv21AuYQ4wrBUyRMTb9CWcI5pvAfbTrtAm8FSmp0t3nqC+YGXx/O0H5Qc

s1/PKA9EC3LclP42rVNb3qlCknqvzrGUetTcR1OoUH8SNAU7yjlSH2VW3kbmZVZBbTijmJfZLSGVCr/L

HYFlAMVDC2BnqKjTgVSUDCRRRazRxwQ7Tuzqsn29kH
qcxMPjIWwljTysHGIZubFeTsEUmVuj33KMlXyVjjpZRq2kq0bqfVlWs57c4Wg1+RrBHnzCe/oWvM7ulg

9l/z3k/WLDJ89slWvpwlxboWatKZsmmdT0Fk8FIOIGTMbVJwTkMMTK0XPX+LDHIyZ5/CwAHkJ4dWe+EH

ecTmXrE7ZRp4bKULipiu1sAmx2HHkrdbGLXr5BA0bM
otn+zcY4MZQNkAgSWLd+lhs2NTK9Iw+vzG0Wj8x/UG0VUku2PHGVA5kVSQbD6nLRZ/u52BE7ETObmais

sietY4m21AEPxR835jfFuiWUV3Rdg4W2OiSqrdpYa8cekJZ5ft0X+x2HTNcFjpGzIAE4+dYFFVxWgLu9

OQoOppvQrMwo54XnHe3iGaWvBlfhAlw9/f1eHfT/kw
NN13US+m8f41cVHsZZwWP3DCRwYjmZ4IKn59P6Wi6ya5FV4RNG72rju97Nb6RSVboSZ03b2YiQQ2LxAH

MmFtpvE9VoJ7SLIe8+Ox7ohD+cKcyyQbz2PGojCTbh/bZqy5YEB8e5uH3nadRCSo8Iq0Hdk2otXlgw6I

UmGT4QksyPPcQbjQ68L4C4teV9j3hE4oCCZZ8QJNfD
WL1bfGw+VHvHsgUytFHfN6C3egGWfw3PMiQTRWv8XI6BFwH+fq46a+2M9ZvM+z/pY/KghYdEozgLD1jC

O1a2a+BUdj6Q5tSzt6acNuJxgPecMJWJd/Svtxxn3AMVrlmEuqtKdKIo5UPziszvDsluHY0eyKmX+0gM

P8mzYRNcbkC3ZKsfj+9H4tmKqdALmOGyhh9qPeUT8E
eKIr2zRgQFNmwu2wEUCZrrSydEZ1JZVIINralmb8cyCqRGf7dsQ/mizJG4+qoO5SVp/IviJqp0J01fVf

6R5+tDMDJvhayvhZQJyyhpZrc3fm+5HmX4dj4cgdbgGh03FkA7Sf+g+570K2bTS9/yEjqzrr88R+zjgO

nyuvLGfgbQZpgrvfCAHZMJMTA5HhBjBVEDypnftR2r
0fi8cEhK8MDm9WN/LkJrzvULKDUCOVwNrCdrKiSYl9lRioRBV4rYUS8a6xbshUwKBsOyt3WpODGwaNLI

bBhd5CxR/EJ77fIJFek7LDsqwbCrrpxjDaPaLfNzvC/vsTq4eflJwR50QEUvLk/x1tKnFABnBq/xX808

YTgiCvMS7OXvBzwSv9YaX63CAeMUIApC5UALr+s9zx
sT2LYOArjDyyY54gL6yseICUENriepjNfbj+G9+AAJ9hDRBdmPBqHIM0IzqDOewRjsaj4AP2bEAnHPan

fK7t1TvaACwT4rcseZg/VdhZeXhkv1iAWukJI7XTnGBSK2k+VtRWFpU4cXsdaHxk16kkBz2m/YPdCW/2

GKfYZ3/mmo45kq5Lk6dtdl7cQD1f/XQg/SQ2MiiYWh
dS/71KwMrLYmBEKRauiljVZk3TToFsuFwcR/rJAniU9J8tQ3XjVpjHFkOnFGf8fiToZVcxNG9ZFHN8ga

E3oOqBx86mX45e2VBROb4GgOFZaKVtNlnnz2vuCpjN6w8XKhFUcYd4WK5ydGwtxBKXT7K+yddiKRd6Y+

dErVx/K//VEuJHWxFq+49FtD9kvooqXqkiFpKC8/HG
CHitpGvmm7tT4tMp6kYaG033g0/NyPTYuk+L42N9eT0qb6NLFEWLvLeNhAdIcqR93wG2/86aWqA0Mn1w

jXMHVw4sMkhxs2scxLZTGlapfGtv6KbzpuSbha+awZa0AGgaekJ2yroTSEJrQWGz+fIbaW2vAL8QXV26

Jm9/EaJD9hGxp8xATPamPqej4nC7rfU2e0qjo/tyQ3
IVAm/QyPu8zcXf/Sk41pfetYnT7Mog5nFit9xGnD4c5mSpZbEWGE9WouNoVn2fUH0ExKWvKiIdfhctyH

bASDjPkTQqsqsHIpC2Tg7wked+vb9qbbNx7wmw8ril29ixtPDEBwYWJIwUi2VX5DR3SzQtlH0Hu3KVCR

07B824kdh3M1D9rWedBvLofUIYaUsdnWzpCXRo0j6a
8Ei9Omg2FlWg2R64asnsmYv/96j4QqnYNvIr7DYJTvgTA7siVQsLH36nLYauuayG78jMEOtQUCp/SncH

z+eNKylFmNksb8hkmzU8ULd7G/p0jKBiS48K8nCE/lP3Z05Y1BI19QmB9hK/KXTVubLXIdqnE+fHXIJx

yiUJbCgsy8KhecoCAimQQn13BiBBBnawJUEYQlnVk+
Z7wQSV+kJNECq4LhmarwBEry7IkJ+KkpE7Pmkz8nGiFDvSHQmGfxS+ZDI/d9iP03t3j4L37+SWMnzql5

U5Nx7KkjffLdk49RmyPpWdoZFVLLXIOdSvTwDZLG1d7nkVt3gLiZvzck/QF37vaKt5D/7pjzgkJnvObk

en3jdg707O3aK8EKtJ33vPAQihvN8UniEf3oEiqsAJ
h9NbUmKvxDwGkm1apcB0Ub7pVVuY8tR+o2RTPSX27LBx7X3T36ZgUlG6NqZkSlgkvK1LfsGf0JsSBBDY

eeqUFum22fYlUMmSpg6L4f5K5ZzDrcoXxbKm0YaoSHfDlsfy0sJs/1zu38yG6QwyfbPCLQJlq0iAhawU

sxpxugM1T2l3np4k4+pd4UUQbMqg/Vqv3FykjslelY
h3Uw3+PryO/KfMQmLoownmE2ikgE5lSWD3gD93XnB4blG2PPkBOHcqcEedDJCXTm9VKEMNNWy6Zvq/2B

pmCn1ZKhQPAxieOmtcmervqpenlxAQjvFfh+5OTLo46y2/i9r4Gu5fAhf4eBQhOFivBsEQikRvK3jExJ

iLPCOH3Imnjq/3nM9Muqlsj/p77TCq4d9xLTOPpI3I
WFj88U9RHRMxkycrWfF9P/ilmhr9fhzKRwnr2A3Qyeb6jgiXfd0kzR/PAos6T++s4qX6X+DGjQEjMubv

AKmTFjG7BzdXZn+PXpB6Z3WzWZP9+T6dUi++YO26tye7Zn5dDGJJaMm5FGBzTg40A5pcjk0Ny4c25Stb

OYdEuR1PJ6Ehm8ygpvg95kc2+Oq6ujqCnrGxA8x7UC
PGVwZmo45/d50gyH3/NyS6ALZ8Q6zvoyslTNIEgEfj45bR+f56sBDzGUVqRuVVAqDQ172+3qe0ZDz3ur

uHBf7YBKWcM3JncRk63jvUOWKoek5E8yP3pb150mTiYbj9nCYDayfuV0MHlPPjf0X4yGirEiCu4dT23q

bHSkt2p559YySziXHj3SxSi6fIBm/Msi/ygZ0ycdiX
xiraO6dlNkmtbijmju1C20dtMd4VfV56rUkzGyQQgptA8DXaaXkx/+mHr8fT84pfFtjcCuo4r53rZTX4

bgSJ89goVGwW/6btFcQ61DKt1ql4lCusfPMrUNrWe5i19uaor+L3PijWL8wopkKTNjjQntXfq0InmuLs

+quH0s71JvDMquGYR54DIwIC1SR3WYPpI0Deq2/PEl
qpY0cev2jQsK/X3dkIOZ5/U4I4sj1nEnpd9sQAw/IXwNwsCn5j44YB7MBBszvejZn5L668GzZLzErKSy

32Y026ZVquZpSLKk6DPJ1a0UumfJ+fn6h5jTs7ZKcTWHzCawe4ACHI3pqyw+zJTcDs9YECNWvYQXeD3U

VL6F5dBciDMOg5gNp7qKMWJdO9k4Rx9Hr6IVcjjor2
XbTXGmcchqEfdwlenc4rTJCN/mqZn8zpHNSmk3tueEpcWwMAXTmM61uvNj8N5/kwtPopiPkuveG78DQ5

cVlVBl1jC+gI7ysS14LUgara+H0Z5ZQmjPNneL9tqaRY4XE8Y+jQJ9YQ5ax7S7WN/Ghv7zgmcnRlKBSO

zPD2OeelKZqdxwx23/+qUr3IZp2uokENDMDZ5fNOaO
k+WO1q54muxUGgGH+7+advC/1l3fTjMArIP/ds+a9sSjuwwDRuvXD/8+W4x/GezsfVlN/T8//UReBW5j

1r3y/iTSkBfQ8PgwvyN87aMrXma7AbiNEne4/++fNPxv+S1RWYXu/u+MGUc428uP4+gCKklJEqUz8NQI

khOnM+l/IbQaCQMi5Xq+3/V1EXm/vTd123KrDqI+EV
jlj5ensTlbhkaaq/Y1KDYJMbOT/izTr+9iM/ZdF1OYfkOL1/RxSl+w0JinJgZp5D2X8jTtJKTn374E8M

ol7gFoSQ99ZWt+0pss+nBGqNmPhmzRnaf4aLaCpE0/ewLkqTNABUhlfn9D5vNtM+wGZq1YWyvX6uYBTl

Ef//QjZGN5pfU1fiegkjhsDAzFUTdd5OooGpHZdPE2
artkbpKkgEZtElLLZOzyn/YKO6nuPyLOch/WwEb4iTU+ogMFMUlDB7mVUpgsK/bATKXWzjbXDCF+Zcev

+1UhKL2PiyuAZvgt4AYkc3+lZ91L4AmtWoYDrBGnbLw9ea2J6ih5Wy9/X4f5Uycw9iTVDd1cxd1Le3N9

2oDuL7LmRu7Qsno2ZtyGzPxqc6qQcXm7hHz6rdEX3e
CDpF35gZqtSKiDo3zMJXT0zG1VpEpxDvjuxGA3BbYiu/4IKhaAdBbAiAODOGVYJCJdFe+QhQzf4cJK/3

RPL3Kw6fFNnuezxtd9Do7G+NMbXlXSKTcnPsqLnWmc6qhJN3wycG5pOTRrdr6YOw+Sk88Jv6vwb109MS

Pq/mVkChIPoaTNiL67DULS6yZF3zpgagtilhB9927V
7QVRX/gZf3fi/wpFuHqXI5aIRBEhHNL8/v6Ex8yxuwF1+FITYAfIuNfH80fYDw0opLMoA+1AHcBLqquu

aOxlz75YEu0f0kg6c+W1JWUT03W5CWGZKJ7z2siRrNquT+FN1tlb2FekTO8FJk25QkC9n+cBC7WbDASR

P7njZ2o5tjfKCF/UCl/Sxqfq6OukI3yTKyb6aBJdUa
ZtSiorA/2Sf3C8PTuHrSgA+4sLJavdy6BXh7omWgecjroTUungaWAdb5/L6eR3r9DOfYLzBKqPXTTn10

mKkgQIsShU4o07Y5Od8y0EjBn5zpp0RGArhWve+VEaadB2fFSGkk4twZufsjCFXnqUOCutF7C3sji47L

l/8o3qvvBGSVhPS3bfJ6mwI0Y7SJXUzNr3SSRbOB7D
566VuYzqWdPRuQ9AnVLdfjxw1ELC7tM9DdTNtjyXYp7khi2YjPaJqADkwqXS6skqEIy8UAzSxuDEtqiI

rZoLy5f+2dLLG23RKzOaMfRtqXvRV4KCXfxfJ0MvhVZ/8X2mUAjDE7Z1wDSHXnn+plq9dOHNKkmagDNn

OHeKqdZBo94qXDOOtnem+XWtk7WGcceYCf6wcAiN/d
+HFJ37Smk4y8AE708e9AE2VVSEmAdItrxQ/a3uQ5/dxrBXzvu4EZW1QhNpFNj48LvwVBpBik04UGXSP5

oqoORSl6rr64UN9h0O+FOU2+slD/JjqNDR51sjbOTPGT1Fl7biKzlASlSbwFYco5z/8+Zo6izK9OSm65

MBFlPVMdKhjeKZj8LvhhVoJYtL9j/dUaHWXg2vLY8x
NETG4pcDh0KH7sLsm7/Wmgeq3o/JSV/AQkHKD/5PXrcUuHwUpegAwJFV3ymG4jqOlrzkdjSTw/UnkbLi

jVeL1d9eJ7KhBIAb/aO4g65g/de+Q3H+clEaBLoXkQfwuHXnJy96TexnTgn16Vsa4j8oGe0DlNfv1qAu

gJEW/HOBGLEiEljZDlCqdJhH3L1Vxb59wQ5Phh1tRo
hDfdb8m6sVIDDDzPWtoTHrcaMi+8C78MrR5ZFUJXcg/2nG2c175Kqh5vpia8eMi6gILZIUsxefHFlCrD

+vZdd7fk00jKLPM0k5YgVLB4a4G422Ksh8Az3EYmMWqw1H3Vdrd6VNvErfZF5kOIQcj0s8ldLglbNfaz

/hkO/Bf77mf+ya9/+UmGHfAoOGuqPk1/L/mZk7W8MM
VuCNV1YZKIai+HlBXYNW/MdX17RLGOTtK/vlAVhJAkinOPAT7/Vxa0NHO/io2VHylcKkFTHudAAVH8fZ

smQtohp+OYU1gZhY6acA50dyXJMhmq2WoixrOsEC0TbFsp+QgUJm8E9NmWc/wPLU7fU6FBcegs6NdKr5

yP56yXvQaJpT/UdW4DSapbccqa6gwq8Ug+Ompv19jQ
230Oz04Dxnttit5maGvONRyPcLMe772DHuyQIirrWGUonatRbz4Ee8vgRq7QGb40MD3FMkcjCUy9h6iw

hNfrS/WQlv4oFH+9KUfInxHsbEKUBKQvACg+uMtbiJbO4UQWG9ijnTj19CZkOMVh75AtmPdOM5fiXiHe

uvYvxpKV3WEqBNDANtV5kgXjrppfDkxicWS1i5f6bC
TssjFB2/U0dUr/tVaKdVawBZg7vEN1JgB38tFRNZQC4jc8LymDB+j4brZyys/YJ2HWXf9rnIrGUnPT6c

Dl5R0LYXvEnVqt4wv9U2bXFiuHRwPjb/seCUeM59Pwg/FzxNSkL2jpF+e+/+vJlPE29O1R5CiS3+gpXO

Q1sqfljPE0cgPFF14U4nBjqWxx2tfasjGfd/SEv7ke
ogyyPuhSjopdYYleqZ1JY7jrlCpHqwNn6KRkfTuRFDizJ+1kZHmSOqJ7qqHaYKymRkqns32SIprE9r+/

sD6wrq+yThktUs+bDSBKJXfelikNODIAp/g7bBarU6VNpBL2mJ2Xg3hwJBpe0O+uYIRjN9CrHQAX0m9Z

Q/dTO0BdMRZ1vfSaAIwzJW62dJAF8fEGlqMZvlDVSH
qxaGVJuoQXJcOikYwS3qqgo57/hwtEki64GYROri7psbveiCqQR2SKVLGRSu5JyBvw3iPnnA8eiGkkhT

iBsvaIMlZSVLwid0+tbIZu/BB9geOwe7i5gnukp0pZ+7Cd2VKoTiMxJV5tR+C+wOy484DUq4+2FFem1j

JBSsf+dx/1jSq7Bv6z3KQPSWbg639T1NsNI0AGAJgP
z5fEJXk3vokV4Poy+ZWODnPQLeaeJ6/8kwCi3zX9dOn08xPnZxsnE/BzS8azpJ+AKrlcA7vT5gwybKGt

lWwm7UVMaoQkat5ilNvRZY17700A3SnlQuCC8O8P2ey31cMlA2c3iClQOR2qU6flU3/4lHhhiLm+Mz6I

JNSSmhEnY3wVZNJlZHhc0L8Tue0n73BtX7Q7OlFsXe
Ne5KcUbRQYhMyuKPgjY1PRadjcJMfPuJGMIEej8iBKzqJrkwRJztbcHlwujBfXmXcl3Tg6lUD7VNJ2/Q

xDoFy74RmFCzfNbvUclYc0KJlm4WDk0q9MCCnv4JV3Ml1uzQiDusRqgxmLrDW0IGfR6s+vHbildDZLqN

eoFtfO3srasNqJzqBa1cgILC9YJqlJLXmV+ApLX9Cj
efTPlc2+IXfI9r+9ax7+w72wQ3snvP/CELcxptjp1uag+oaHnfOWdOBk85W4Q5kdVKqk5Hq6du8bk9bV

aRRGRiq7G0+AOttKWnbJ0+7V3FiDQZFDj4XxvoecFwXNMAtJc9Dc0JTrN6Dy4wgicjoih9ZclOJbu/PQ

K+NKg4xH/XLL3weyS6pQKocR8xuIEaP5xRTqt73TXc
+4jIX2NVsEV0KM62h9KA5DBy/qdJ/fG8bsmnteREXVgkqSOPhLpl6lFcI6AwnnMObH2yjC+wAVSN9EXl

g5xb2JWHYGFYov2QLk8zabmDC14zoj62dl6/nD27mIRI1FQAckL1658UEdHkF6pfgrSFqThl5ToenKU3

9K3T6y6S+Ou4+JTWz5RNm0NbASiq7jvnm98Fdla35O
UBOA3psjocqgZ73ExPBEeasN+5P5VXWmSICAeXp6UQmkw66FDxAmnuz5s90IVm/fFjUWdYc4737QpaBM

TYZHJ6HmkfypvuXEGxRKQLqN3tzgQNRgAwm3n3Jr1OH9I9VekY9vgOA11sQEogDxDimygTVx5uSMpceO

rVlt5IEaM1DtB5CosoFq+IAIYj7bFSVfl2TMDxbc3p
/NvmkxuiUoW/x+KSZJ4oCBx+maZRDjzfGUzYNaHtUCqihQz7roe9Xz7t8iWNPIZjmCgNfBxmnNMeJu2J

Hc607l1uX5z0CWjYeQVh6Ixe6XRs1cd3pPjDmzRqrL/BVc8Uh52Rp0iJzWeT4OG091phD9NUJhFhgdLX

yrbaqOPLWlwTG/3hfdQey5uVIzWRuOj6TX8kM4UweS
9xAmmu4fwJLFnHrckPN/fAxc1P63XZtcjFdZXiijBaQtxFXe2gJFzYZEpvCgmlrGCrvuX6vONEIQ9/Ml

a5sekQRxw09L7qw3xzjVFawjULga1OMmAH9PzOWKwCiaBNlSRoX4PN4GBnJHwGXyrmY1LGSZyHbb/xEZ

Dg9iCN+CkFl27FQp79nrxgfc3gBLwcDLdOPV0P0AND
wOB9VnBeRk7HzwYK8dKjhlP2ZJ7ekmBsPfyJAKMKzfOdxN+YLndvaphlELwtBqYGv0Z9v+u7M9f8cVsl

62hO65tIdxF5O2FJxC21/MEuTC/4hWc4kCq5In0fGAntkm5+MsEnqR5Ch7aYRK/2KN0XL+ao3BtogECH

+vOqNvAe352o0DpdnvwqfRuqcaZ30Gf4sghOp/i0xZ
zKEUoCwsVtE6n6+Hh6Ahed/SfAoM47Op7dq8/ytP99yultmClzqRjJ/GV+bmjgXcGvpSEbrwV5ET2VHI

i41FDdTwEhShR3c2JdjzOQIcIkr4UmnsJ8TigGbakgpBKG7tvn7juLHEmGecxiE7AQW+v5TPY/Av7he1

Q10U0ZNLffC2AUJoPI19K8n78Co6VhXMO8V41jMCXN
rZRE6a+IlS3qvbDcRWhyCtoWDu4Fimfziksq90827gc7iyTLjcqkRDVfZ3th5iQlBDwPWg1YOnlEhrHM

qXhjfWvavc85qMGseQZSeV92S5ID/tWUZewJEf+CpzhyjsTvvOjxKXa8fU+XKZE7l258qbjTUAFzv5JF

AgzqyOTCbS5esq4CVwP02Kumy/cLMN0yG2jlPsc4Ep
nC49K50cgEDwldhKpoKIC8SFl4Wdd6GgIyy4nFyedgI/Jw+DQhsy9G4Q4Sl3k5TbUt8jbM5ySpuSPgl/

oluid3ex/2ekQuaQhCrp4jvR4aBL7x2IZzrDH6TzZcBBEKaKtKDQs42F3HHm66o/HeBFN8r/2Iu2u+g8

vOTK5NPw4M3SQHO1G4RB4V/rdBrEpbvFZqEtkxY0IP
CgljUZA1IJgwzxfK7m02pwf2sO0FB5Zt+eaiV1jJTSlNB5U/efn+mYdk8v4Vwim2uFy+9luCnpJD9WAZ

/juTDoHr93pEXKBIyVqwPwfN8+pv9iGvyxXcusQA9160jbOTGCdGEQoXPU9uHo0+SWQzq03cPTJiSL3m

ObLv05jZFY/xTis6vAGo2UU4YztNHrxVyxLzFlGYS2
c7pwIr4R1TGI9j6WqxUrMzihEfn6k5W5skocEJAptmly9tvqtvevY0KDmBf+Hrv+4zausFiLb7rduWwA

pENnp0EbtpfapUtsuL85K8mR3fD2aaw4WUDf4hnObOqo+nDYlDqtmlt9Aa8wSPu2bNU1HPIPu+kZHGOB

CuHOo1ga8fcnz7ktvraEQ+5GSRhcSjfzZ/l0dDN2k3
XXLDAcYGBC72k/Cqndy1vb5O9+JtNpVQ7XrRB8otO0TtKhD7rz3gKLhaXNEcbRm8h8miRex6ItzerhQb

tKXvE4GGQtw5YkjTumNw+FEqxifbEOH5e9AKxC2BYhQssQv57OdoCoO5WoGmYff7N03k7jdPK0TC67Y7

R4N6PhXT3TBARpkCcAZUrgxZwGNBg+6NgidvUwSjc4
SsNQT++WjRVRqFsAsw81hL69EM67nu9nyF0JHpJ6jMxGabmE//pmWexg2Pb+iY1g3IrHQ1FfxE54Mkwr

iHlVr8RgOIWxLKc2/aGceokXNGsjh8MzNL6y2who4Xd0PEyU08lsvcAR25lnlI4SyDPTBtwzk8RPwvMl

54NZhHDzd8dF4HDXZSN3Rq9gw9dVj75Lmp1G8dmgSq
UCFMFOFt04UBgKQTZ4IOXl8hhSZHmVk/IgDtkrR5/jVZ0gUxOxi6RFEmsIlvViuc3tC6JA82uaFOgJyR

5+GPT7CzhT+D4nqgBYwQBXFAqxJn8n37B8RdOLdun4qEhOA4unUslq/MQCO2qKyI7E3bTZD9ZuGValTC

pG50iMGhf0anxWkOqCpVWmzL3T3LtMsvQ5UU4/Buzo
cOToW8SlO+c/1DlMR3PUrcb/LoULgiEY+Gv928H7DOdSaefwhMbXjfsw+Qahm5yVrndz79HRrWT7kUbL

/33xBTvYzHhix/3kp5F4YzlerI6bDHx7G47KAHMH5C/YWQ85+jDlFsgetB2Tf+TGBU9uSqrhAcYVw/zg

t5bSXZfYMTg1WSB935o2HNZ/GKTVMb+/upx2oH3pj9
fIDKqbCatzS1FhtQ5sg2XWAb4g2KuUxcw2DKZtIqjEjMOAlfPuYiXQfi3+9RUPlcHC6lC8OiVpIpUXRe

cQ+lhU8ywLESj4p2UV/OqoRfcngwvnxPnvp/jRfGRCJ4HvocXUZT8u3O7P5UHsmDoUtqMiqBfaYIXPqt

QKV49I7f1zn8mHYOTyPo6VY1kj8p8S+2CYj9U5d/zq
9T9/bte08/z3Nun+4fzOA3ENL+lIZSxYu5QBfhMHWJhW+IrHOPD/X7F0940XNCDFndBJ/G1RiufFTX3E

VTTxCNq3IZRwR41rLq6wHTy3W3aPEgx8qQtOKDsl+zj01PSF0b8TYTzPJdTnrlzqlDf6O4fW4vV+hGz3

IFBiydwuwzeRbE/r6Iu2WQSKbo7uz5n3EMRv3l+Lke
624hFLVx/C8tZzFdSzIX+jCN8EntAygV7Ij9ryl65JE0L9V81PvstaekjpVBU3XvT8BESdS1e//RCsE4

IDdYdBx2cNEezjNIy/dnuc3441cEqtbjHz/aP+YpUMF53gPvjE2Teen3mSJRsC0/i09Tdwqnnwo6A+OD

HF7Vs4aQxRyiiInP+UCV1amqbjiLyG7+f1OPBfSoIF
t7hPKKA5oDy3xpaMoU1mDU4W40PRRqJUfNrrlVIan2KGCaGv3u8ItsBR5iWNPtdIuL/w2iYNzNJ47bL2

Zc5kOARwdciEu3HMoe/WvOzfdxZLdgDZjBhMJE+M6/Zj3m8/aEgSMsgfG06vmsVEJb4mRufH2uccxvsh

OKP8QVXm5EdWmeaebyjh29qDK8vb30X9HuutWOJ8RU
h3l3SrrW1GMQgBFNr5y37udkZ1Nvbt3sW/t90UiFUrA8Dj963hCm6SgxV2qUA1h/KcsnyBim1H0klUBK

2JYNnyKh/WWrbmmS6fsxJ0bn12tPjTVrBXEpgOfeyByIjbKr9j6AZh97hzV0u3qwcTgMO3Fp7s3rMw1s

b+OYKizF/lGY5Dud6X3gnJmP1mDwyFeR2RJ6kTQthb
R0z7bMvehHjXawgCqNpQckkaTK2hIfL0EfY3eGdq0z8tSP3EIGtcNJnPXATfHBEqitrFe+hl072zfHeP

YxUTD+VamHhiq+c0G3RP/10cRl7BcWLy2cUlofwE7X6FiO385jhzhdSH+VKLpB4f/8nt2qukQZY7trqX

Do5jgTZNYrYCCTEYBSvA+JVVVu2DAo1u49ove9JFy5
DCCujKOnAnRNBFOeDOC6yRGiEMIBAApbhJtHF2FswYa+vAHuYJ0f+q7qH5qg0EGMkdjYhxIDDdbKprVF

ztkamP6NSyJdXQF6jGE/QHuxeYfFgCDa41+jNh9ateoVCaNX2nkLPFH3YavKYg8sjxK8m+jukMEa7vpL

sn+jV3Naa1ttF0u7lx/KxNOJ32QWPnafLpgR8go+UN
uSJfSZOz6zKc/w+LtndF/YeZzb1zwf47Bbj6jqGgDn9MB1e06qgND+meFCzF11Xb977i/xjAe8JmHgFo

DYYCPrgyIrlP6i+/eKktVaiUVyMaGkWZGX0RkySHByVfK1ZsL6I0HEoSsCyGiruBKWSXltteUg6XcGGi

bYgu/5QtU/qYSFMTl14pst9kMOePuNJieJ6pd94gdQ
MrSi5R4o2e7iR4ZQaIZIwQ9s2tyVmkfXPfFH4enpWvPtQ15TaCqdCu88QDp3IyEfsY6uWMf/QhSHiYRK

bMGvUD4TuBL+BATTprrNzUzmHvZku9BIuNlpkKZ0H0twxRk1xwb1JqXYTmWSbsrN5gpf4tBkSyYsYTPp

x1EyRgaHom8hFs0BuHmeBnMkgRlHsvurijRVeaU3Nm
Ylr/6XezWIx/n4K9vvit4nskqbYxDcdNTl/zUlVVXiw+XBv8UnsW+1YuTm+cAmqT+/P5xaMW2oM11m90

8dfQr/tvi/QNEllf1ombwRV1GipgulU0ntPQJ8l2ULKvyboocVimZZkDzupCF47Y34uwiaQ1TCzY4zTD

SFu0cMK1tIMm36XQup2zcZfGncvhaHvQWmKg7a6zJk
SE9xUQ4xHsbKdw0ZHC3lElE4yyuaHr9KRSq3YBVS0dAwg0v/BI5gPTrug1lnPDSwBrXNmNcx2dOSZbrv

3SK3WR4ZNH11F00oFHx+LvnS329erkAeA1jz/a42MZBuAlG5ofjiFERQnPFjHyZBlH0xN2gjL0KK5G5W

bvidIL/Fn8SXBtGDk0v6PA8pKpX+/bT0YecShYYGaZ
myJIx4rfARV3YTphrtxaykPwbrJQl3F9aerZnt/Etfj0iu4ej4OBgW4N5FbWOiNbLMZrIA1wsgdl8OtN

zyk+ZaCBrADiyClKw2onEVZ6qvf3S1sy/hebaKHTMF40HKIUmugGmMS41vnchXSWrG92yhdN9k05qp2+

pDBn1/y+OFaSZqBg85UHtKxB1l7z0C+L04YFIEOYS/
eMR8iCeIQ0kosHonJ6bD/rQl4Or/woiZHtUF4akcsckQlRxY8wPZ23XEJZecqHQNRW5G00sLg+1iux0u

O/5q+m1uepW5graF5f8SdlxqoIOhfQmZvQPL6AKwyJA2IhAs1J5t4Rshmsc0bKdTbxrl+AcROWyB1Euz

kzBY8FeXJJb7QHEY4rUFonuFnEn3L4dM92TsVi6BOd
cprz4zGUxwmnCyfr5Aphkn3eu3BxASOiMc9GwI7FhCs/qmnbOKPQQt9VhRhimmGSSJnhl2NgkZ9ewGXI

xIKI3SGXEaa9OnW4J4ZVKO0y1G9aAvibi8kpQUfGnB+kwf5K5zhvPyOgGpMYgQapCOzYp2jrN1ibuZml

Ep0rEk/Z5S73wlaSk2UGdrGEbgg4xctTrmath3WcYa
4/2/Lsia7MOzVZKhIcdaqwoQm+KFW5w/MllfxadtSTzUNTmyZW109lIW4Vbbiv5o5in9Qi2cGDsngDxp

qxcVP6X1hmJH26a/jixjQalCOUtuWNxgYfiegH8bmQcNoymYAtmdKYVBVBfJg+fpcAVo39Gn4TaVSMlV

5vdBHbZ5aSYC6x4TnH38dg3Kp1JBCNcfEL9lG51jBH
5x/Kc4phS9IAmxmQrrY7aRY0FaQvN0G4HYMtZQ4Psf8++iBeuiQ/cXoFX3eMERV6P1a1ZxoQRyMcZ42H

SbJyO1mt2LfXp5znHIY2vbV5gCzeMZ94+NRq6PBJLQcMeZWQ5i6HBU1lV7Z4Z9+EWr75RrNhM5Tzz2UR

Cz/0Ljp3RwM9U5Tgh69CsPrpkoJdT1Zi/1VUV5WxYE
B499pMJ0sJqUGZf6z/y1zRdu7HyhjgU0vAzU2vj67efm35T+Rs4ZcGKUp1t3s4e7K5zD/BnO1NAIxM4c

XkO104J++ER6jexaQ/c55dHf2vdH1+2tqQcJ50Cp/HtfD6WwGy4U+oIYCA2l13d/CtIkGccHcq6raUKW

AwmU5Y98u8Z09oWNpLx/ZDp2knuFwSgxSoJpEMdl5l
a7Od30r9DXQ2E54BFserp8PLOdZOB423YZxpqDISwwJSzLO/qin80ztW2zjpSgQGr1g8mTKalHxn/95c

thBw/qxS061F990tk1pWUr0p9QctH+Kkas3PieXBb0LFN8r+OjjYlfBTAh1N9WUFzs75Uxw4W9XTd59n

MK0B07A9HBeNQcrw/iRBhoBX9vtzlINhpBaokoIHej
kn/jxbQgL8/hv7deaBDn2Ko1vr9I5jx7GoAwoeiHQeLW61tWLVzHX0CIIOsx4iIGRccpuQqkVqb9sudj

grRVRjGkye8l5ZCvgDTNuT1DjR+SYSF0Uk1HaR4k9o44MRYD+hwOHtBaIRQj93VGCea1hRPeschTvLah

Ou//WvkCK0fQl36lXGmu6YuNGUS3nNx/Ggbs519k0f
c4YEOypIwNUD8kiioE/faOdC+D9lMptJTC/p+zy3UACBbKYQlwVNm8ps5r/IXCrDYbJHqIeFEhR+z8Mf

FjVGJ+mLZGEOAIXer/7x868noi+DZmr12yEUsQp71+vWeWGhefFFBZIdX/LznJ4P+3//rra4H5vge5F1

lxkTrlyxsth95lFgGOHbQgQGZmd9L5pBWPUsyf2zba
4DNSPnuq6ImHDR7/1/WLMHm4PWSki+Yb5u63CHHtfzPWR180QGrJKX7Jo/BYValMxl4++yd4fIdvsVRQ

+0UVMUdBkFpZl7Rb+HdqTWpx3qoHouvCIaki/L6pIJLFxoLQiWUTLSTWl1yGxhCy52CB0DErRdasix0E

8lto3L/bA1dSLLy4iXCpOx3jR0xQDwntJ8NR/hsv/c
n+/gkgI/18XRK2RUTl4w5v+l1x1N+lH4fTZ3595PtHnFcnWa3he0/NxwGTFMJOZRWgW6QEQbRb8fMKqI

Gff57oY/T+MCWPhY29T67eO/5jQOl7oih+E93hK3+3H8Fyd8PSw50MDiO2lZdpXetaWaWGezhPgBeHmE

aBQj8fsacU/QEnPvHRJ3oaFnjz5s/zjvcwbemOfnoU
1DWgtfBDLPJEe1aLIpvu5+9Saa1l7Y1fowXE/R6a5ajpvqTR8nND+SCjqVsiP616ZMQWC/h3nIcduoY1

5T2e1DcQFnqtbENUB5McsfHuXhYFBjvdKLgcAyog7VAy0ULYuub9y2y7TzfGSeXbytuv5cLVJQeHrTZW

SXEC5KxBRBHh0bNnEXFGHOM5q95MHoeLgE/RhTolu1
C2tHe958rk6P/BROIEZH8QzT96GfyuF0hywbt0jtahPgDw+SybfNqMN+nTOC/pzuNh9vaU5NJ0Shbyjj

t9ySfa33R2n4Hz0rNctcStM/gQ/3ocQWZrZ+tH+eJmwbpdT5CUJCtprGgAo8ufZkdQUtwK16RVGTwP91

deuRqQmrm9pibwE1XW8owbyxhcJ01L2K32v6LS0YjE
R7hop5+hTUHJ7quWanOT10coBUPz4lRIJQ5Yi5fWT23T+9bmgh/2JTPFDAVPHtzsM9NWr8tKprcAFN6u

EBxSfafGK/JRb3wAiamxEX15GsQOVrJZe3xkC9Xsx9tDep/94g80vkSzxHrwWDinpYyXVpkBe19tNdHE

MITMdSizrOfekAxtB+TzDkP4W43c98wcTpaKq15qn7
q9YdS0NVbQAGvqQee/vHakeALkEgYevWi4/pxRaZ9GqbFmI6VEn/vWCE2abBGixSZyGqiw4+D78hUvxm

NoOoaUm8Z8o9Ful7qxSpBQDuMlvDqtWEwNCiqxtCTYOVm62Wc0HjMYiO2UG1NKd7mdEdQ3kLrIaE4ZYx

NXmevrjW0dx3O2Lnowf52RLeB1GivpO7KCfN+1k/oA
cLMHE6S093fpSR6MsIb3IaFVowv63ItGysE8DNdpuPtSSP4QmO4XlTJAa2BDohm2SUaIQVjIavrEtfsz

mkRnKK4Z0+LVVAh8Oi/TeV2M+nKa9kNE2T4YnZD4RTOGDWytMg5ULddmlc9HhSbqAMnlvJNTS0DZK6Gn

QiL5IDa/JL0JOWBu0BXxkB2JgDFNttBqFW6EzCTao0
KkimEbbkDYMb87v8N02ieBtjNVmnosVE9fUFeU/o20qTHVA5wAS/ZnRg6JYWhgLAI1/LmdUue0tik1kO

qdWx2LeLcxdqmq6M+sQP/cohXiOlxt/hV1vte9+1QxNIsXfTqgFgP2J+5I6TeIBoC4voI7HmZhWJh/95

ZBhbWWfnWFyYG8K7NYSZA9s8WLvVj2RJ2yum+av0rD
308B1ao3e0Afjr9cMKHncFHkeZWNqvx/15qoFszSPorYJc4v1cJacUeqxueTTZO2jtLCFRwEJ347NvzU

YYboMNSH9ZTGegjNf1RehQbBKxq4S9usR6LZyTErTkRdHU5O1E00CPabZpfuXtib8/PVzyqwb2THjz8f

hPeOmp0LjIMJdy1cpT0weOcxxYGMPX1WRhypjdNX2e
fTGU7nUd2j2UGwr8YrOI7dWzMS7ShYrwXcK96tlwL4CWmmzDs8UI2PvBe5HXi8u9ZmZICDFhYSKpYjDi

LsdIn2vic1n5Z+tTqIBoz1sgusxCiUoVClA1YPHyqyffXeL23npi3Oe9qbmNuYiuxfZI3/51uXU0tWXI

jlaij75NanMnFOCZ8E0F9uRtvp8r654A1Sop4uOJm4
+7OG9HCKPb+tgcLgqnKqVzVfDNq4l+iPohhvbXxWsFe0fLZu0yucVaHoRa0Dn5P6asNefZf4l8ogzQlg

LDJFTDMrj8e8qgZVWj1VTpF3h4XvjfdX3AgNghEq+rbuAcpvaMbc503fS/tg6qYzaAcObQn9ezC9AopY

UyAN6Mcj9qaSgGIIDtKjLVcEkbVktPrGI+S08vsJR/
Hyty+VpzK3OEXZB3fK7MpzZDVLNNmftznrYxXJvq3rTq/r7ivvNkHnsNet5+U3cBrg0mJAdkIwYmykG3

oVY/e9UTG5bD1as+tbdRoCmcId8Hl4osXLhKecwjpGaariQb7QI8SlhER8r51phieJBxuTbzYot5O+Sx

WxXEn/Fhk/0DDHgxQhuZyJz1GTi1b1s7ohH3Uqnwgl
vaQI+B61tcFwZ0KdmkkFNzt2sfBbPnt7bgEROHH3xal5IfS9xxKlrPIfPjrNH6bxDlBLm88pZzaLTuOr

ymICNi4sEbq9FcfT4QREEZfn4h798+/Q1py090M5Bpny3/AEIq3qE4bvzjc5bOyvab0VLImoUzL4iSuM

vU5vm9o3WOY5lt89z07+F03/WDVw1rFskM867/96OF
FkuzOXy7Hhf1GOq/6pRq55mYtH8nfYvLUA/nJYgifUVvlVSKbS6Zo1ToVDrBi5XixY32pMWK5PoP2YMb

tdjaV4fgN+dj8qghoyTJkswk97haQre0wLwZbZF3oo8CRlUveKiugATrOqtEyNFUkSAA8cNh7vIqS8W+

1zxA6yRbEGK5WHNuqePunFhtYatF8TvBHRoZMz00C2
IaXffpXiITk6UyjD4IcMUTd9wKjgxpzfujZsp+cNLactOfl8+YITQTf0ZOjj5kVPbhy1+vShDw2bxqkC

DRTo5o7hYGiFezPxsxthF+WSOeNSED1/dgYTR62zYNuLIqkpcF+CvCQfia6vqHZf+OkRXZI0RjtKVYg4

87+gfDGlLxMnu3odCC3s1a+RkYkxYdOSTiKFdWZc++
aOqSKpKnWuNiEGe1v0h0MHtSfCr/fzlmWGN/x11lQ/ZQHhKYhvxsNWDZAzva3i4siXcjKSb7qc6Rh/CA

dMqQ2ZTcrpWBq51ZI9cryGY4wJGXjE9WJ5g0qK45wDTDeqQ9vTUcD+1F+A2ZH0zwhUoV0OTc7KOuzWgA

eXseZRwXjI1x5Sdeu4QvTb8UMzxWxwMAUhbGN55uGd
IK3Rq/33287aUxkq53HXl6QgDPt8iP7k++dXBYT74hrAlwJuYyNx8eNuNvQ2/wm3+Q/ut28A/U7QWioR

d+0Ytpsxq+0Zg0c7fbZnxfSDyjE9zeQ7qQYNCpCZmhou3G2R2BM/77lr+41PgO+V+sn92nvLofJD/V/6

7G04x2+DsuW1mlVS7RPa6Df3TzFnNRp0RnqhrY9Fpw
QdQPPMsbMyT0lQf0msVpt8qQSd9td1n6Bvoy2FdJGGDRXj9+o1wZsMKd4U4KHxkoG6lwO/Q28ty6qNT9

SuF096L0OnOUwjKQrTv5UeiBXRWtyeO7vsNFdoa5telS+pOFXp3nzXAPAfGpn67wR0BiN0LCi2ZhubeV

fn8NmdZa2pfdUe1MhD0WWymhrcKyKwHcWyKWkIt2Tq
U/u48lgufZ5ovYi5BR0bseoo0+5tK+TgvclyrxacJc8rTa9xZVyJiNGMCDEl+px9bzl4dug5UQfWSiGI

Kidvr4z5GKrSS4XqX4/ep0f+iUgROSTNK4nCOA8fN9Ii/CSuO7thnkJ/cpbsscRSm3jWuumwAmz6xaDC

hxrJRnSgs6SWskzpl1cf8V4w8dagwvqkRl0yYQ2S3z
h9oJE34Pfit42UZlVsgN4asQpC6KCTfHIGVyqgQHkgJHSVnjUsltDlIpenrpwaSHQRjx2LXDXADAYLby

RZ2t4XoZHwAyFqI4PDfLLJn7oG8YM5AZmyD/qiPjbKuTj8o55JZM4Z8bK53VabezAL9Jzi2biUVePOAK

YLqh9Rfzd0FFbjOvtmzpyk5kXBnd8jFqQuzBIfQtxo
Ul9Oof0faS//lpKPmAg0jsD+VP58JG5c1THJuyokOSZAh0LZzMWEdFOxVSEagIAgI4q12V0r0q15ceWj

FHmqulZQBKFIWb/lGzlcsarvoBa+69BFvD2kc05N1e7EMhEfY49rHWUbjTmBIi75d3k2t8xdRr1J4K++

Qk4Aw0PaGhjLrF0x05S/nz8wZEUJnugOO/rQDMAcHT
n/SoppAresM6DybU/0qXc6anraqhTIAJPaGp8vgG+n86Gxin5Ue1DW4srt7Ay514wlId/emn7ducwAH/

t/AtjB1p/pzua7l7/W2eQrROZhqmhRV/S7tgumApoydwhbFKXGoxy4JqpS3NmgnhKbs4y+Oxi+oiOAO+

1DakITxV3dcQGzrcGbgnUsRpih/2bqoz7ocWOoDeYA
16Ruttg9d56BHGMkFybAynksLaaPzIojgesmTosXKsGYelKewrPR43DsZcRWtdVhQ15eDfg/ZFEAyu08

+exc2e0HRcTqd2pUmcMF9dMEFu/8Gvpt+tvno2anaC+hHD7GTeIv5RYFhdWL+m0tqg20b4q6AO6TclFx

YoyOhjh5ncEaRNe87Aei2O4BD+2eb20DeuYhx1g6QR
iezLiQt4Z6btIrIibLPaqJly5GAmSV335fHTEzTDHWow6rj0x0J0h6aVYE8u6Z09rG5h4r42N5ENFP7v

Fve7h73fa/kp6/6k2QinNgiAOx1wDweDzQUOUpRO4EzHADa6eWXabwixK/ibC6036j9iqrADBiqA0+a7

PF7Yhhp0YgudGiyVv1FJcnBkVQMPXbuQJoWUIo+26e
xnlaSalkicv/Ooybp5GZt3HO9TMWZKQUlJkF4Lpx2/hoLKBc6TdVYNB1u6IIeBv6lWO/bPydqwkTT7NK

8DHirNL4QGdVtsSu8caawbHtgdLKjp+J1CpiEGGdO5HOKtvFgChzxBZlyw6WJAytoWVdNbPpj+R2BUwX

u+fzkV7UmH+Zu61Mcq1BC0FsNSLNsmSxKtzej14Kr4
0QbajdheRgRZurChMHXZLKhAxEAeBO/q/AWVKr3+KxnK09wzJf4r07agxzNyQvU3YF7k4YHCdPtMEr3h

Sbn6p571DTXnvXsuyoxugq6p8y1Orxxqe+iuRq6Q5QHPEP2Rb3NX0zwbUZIq3+WJcwB5nbJ2Ox8hx+2N

HmD1HRMSZGsOy6a0Bk4acvEJYvHN1G46Du3prumFRC
4KyFyzPj0YjEy/tnoN6eQuJZuO9/U2nUjRO/kULbIvdA+MoARWs4hmzIugXIZSXiibfA5QFCl8q1rn24

cchgeInjdGIdstBo2XuY+jvnWEbR1ERvpt2E20wTeVXTJQFkcvLbb35+MRCHH0WriJ9gq6mKBtbRy39C

G02ZPkzixNuE/MFUtOudRJOTy2PX82wNgO9SnoRWFG
Y6GhbsP38XdUaYj3YP8R5uCwulTn+LDXNhtCoVQSY65Gj46EZhzKFHy/4hfW3nXWc0gIso0uijCivxyG

U9BNQBdM3AYG2qY5g4NUE8hQCMJvguei9yaZereNEOUPAUZ6QgUHns2vdiZsfHyQDXjKYyMCiXxNroI8

UVlJ66XK1U4n6jKCNxHVA4lxlF+lJboC+2eZcubteq
X9BT+ugoD7nFxRYVw1EhP3ibCX8KFaRfHKTuHkKlTOk34mp7OrfCFUcf6Z6yRVCQFNG/r53Fea1UMVLd

oWiPowZcyI+kUz5T4m48u1IpxTtVw5dB5bPo2+m1VwqL0QRtXsNrrLB0sEWCx3lHCJtCv3zlGL/lUiof

XSQktmYJtJfsfpkAm+g+VdF3PNd68CqqtC1Wx8kIqw
2d91xkPMuI0nDBZAFlHErhLcpo61YzUDG0S8Q6dgIRbYcC2GdABcUp8u14jOEPuQga/S5uH2YO/ASo7v

cpaOSeH1Wi4PAJwSMCQxRMxE5qtAai+U4fVe+PvSxMhm+9ToIx12WU+alZa7WgP4WD3q4X+e9Azu4AE3

u15oNYJD203ofMFC60grjBf1T1X9yr1fRHE2mjxD/u
fUWOQeeh2ujKohC31JEvP9FRXL9iFLNzkWpCqSxZY2r+5srZrrsbTg6Kscu3iwYqKneBcmgyPfAIxzhz

tf0brRbFNaoFbV8S/YEtwkNSTpDgxvl3ErQdVUyp9iiwHiXe9Wumf9VKsqOKrRgccXLQ+IvQX3+DEl5C

pZnS6NbAYE/xQCqc4YI7OF789rPwblN6pw7pUqEjWa
G+wt4sGOy6u25gkouL3pzAAxIqAgQ+UeYKwwWC1ssQ6lk2HEKOSvcQRe8sV5d7y6LHiVPK4vZsUDmzzI

VR60Ap2fAx3+JhW1cS1MnMxwsAAlRRuG8RM3uZujVRRhnd+R6mOJirGqOA9tPQDAmuAUy9xdTZO0MIa1

gNNT6mJbVYbA4jbcwwRxHsUaBvf+AaYEM3ryc/R0Cw
CsCZl6lDRr0KTButus6jE9C6G+2PyRhKF2XvAdqzKpFBf+akwvM515AOrctFcB1EfwEW50HDG30ziCcI

B5qbYI1Q112Ntkg/Z1CAw9vyQvxVM93OTAl+ioLJhRyauePsd2bk++KY5Ej3IkODY4LwbaFPOItx54Va

iJFYFp57kFl6xlZ52G/Zb+P7iwrv/FKKT+OdH3jtgb
urfDXHoVRY/15EbCv9hazDcbYTNa+2u3f9wF4rnHgnCUhuXCXUTTBNYzchKjIVEEftPXH/0484y4IJTV

xQ+syM8cIUAivAmNdVD7cMqZ0twOcaSyuokmdljm7Myv9ErTptDc1RWOrOr/Hxdl6WByM2+T+sQEmdg7

W0PXRJgH1xp1TUb32s5PtxmgDymgPfqNeJbhoYIRqy
R6kOU/FKXTLLkhthr74bCLJCUV11QGk5WLlwky/Cni2OjpuLp3NvzacVclipcqntF0rSmzno2oVlJQ6p

6PPNu6EszZ8VXTUA2AasBkGisDyd89Ab4QpTlixnKdThL2ys/rG3GPf4e5IzhjjGxNN+/gmgf7q/+dWF

AgXu+aKLvAHau3BWM2OYj0XeHEoLUF1b4RsfD91fN/
+gU3+n7utx10PHvx9w6zltj6A/BUvOVJ/An5YYm/eX194iEF7goTnwkThXRPJl2U4qND2nMMoyGdo8kk

jVcz/fXoRsNuwskQcG3pYSiT4klb7IV9R/397JBwIa6xJ+1JtI6APNL3QjrrRhQQdDrC2qvh4LRqT/fq

HdCJeRvTAaikTcqT4ob5i/7O7K+tLYLMtcJyKnYSTd
q/CAAK3INpEqUd5i4SmUtyaXsQwc4gYHTtaJIL6uB6TyFn0+RYgr+G5lt/sWAVWg32s6GPUt3wX+Lc1d

Zt4Pk19bACjtjjumaX3Vokcq5iHyAJepHXmMAjHhE6YLOgrhCHSuz0KO/Z23WmdvOb2sCpeh2pS2DATF

uLFcAJUA515kx9c1wh3Vi4Z69Lxi0xl2+AaW2Rvdts
84bngpleJhHLzg3lkBosxIdwHH6tHTPX/Cf2fIpptvr8zh9EaALkLYSmz8Cw6u8be93QgJhHgsAKKiCx

1xwF2tEzN8sRtqZgEUttaWH2P6awilecnKQDZwzCCKXVKjRWrf9zbT7jWL6CYBNbRs/5Svxis/ATzbaL

7kHis2qIKybC/pxraL6smwG6FyM6V9XBeUS4rBrfb+
SIqnPdrcsNraBXVeHDxG7n1RL2LXsNvXpnlOk6jNJCctvEzHv5Pb89TkNI09Qdb66qjD0J4Yy/ULteX3

dOFoFX11gfx+0JoWZ9hAZgUXnn40E5bkycNd0wLvH+zYGaQBYwNZuoISCodf0HTQzaA6tcLdfuBvmZic

+hMFd19flwEJsROZ02ETggAUnjJ02TxB4BL+k1bFCo
nn0HCRIEilii58ggERsX2qOWD3tEaY7iYxwjwcRr+wetJcBji4otPsMU1l5e7e7Vy/74g7vtEg21KpFE

SiyyQjhb1Xp3lJ4sKuA8I2GQxUGpt5z8N8oV2s2z71GVOKQSeL9AhKn9i8zNWV0MzC2n0SzPdt/ZotJV

kaHJCMbu7d4DK5Btldjzq6nGBMFCiOlzVdS3QrnzIa
4yLv5feX2Bttze07ye/UUizdDleG4Ek/Ngvgpi6K/iA0JitrDRnErSix/wxJASTfMPJvq3BuZKMqLEG2

tBrn4lTc0bx7a4XWP2VRXU7G1/s7MgNOTcdjVtiDR7uOn9U+GzPqlbJJjlYE7kDslXL5fNWmF72i2tBA

GlGVTpHfEmkZ/jFF+OioN/zydd2ttKWrv5qCEKiu4F
N2lW9aJom8iukwa2tfNFRLvgx1ADh6WmAt5BgEyuPXteBl8dT9szMxSxlTShY7l0XIVnbP907adBe+Sv

VBByfzBPYzVIsxOSO16hdXTjJpUC/y9jv3PWrVokm84PWZ+MAlX0A1o+9xgDJ64ylPF9ZibFuODl7Cwr

9FWHh4IFSSXY3cR8G3wsGoSlC7y3xf37zcTjx6CMIE
8HjIQKpxf7jdfoADt01vHlrD2S0Vyyn3q5dRE1H2tvp7aCSNAjOFAjHjc9Snmb80eL4Ove9wZ8rm1VTv

PmW/l3XiC+/F9t94M01mx90O5NfK+95MGOP1PkJGktjNuKhgu/ww0Q14tNzdVcpg/uUtBTs82JBbCSxH

/aC2A/pmOwrpDNk61UdVAT46Hy00rEWxNgDyNNm6EW
xXAi6P0DsrYtCjO6BQDYPbhWG6pmYbpo40Lh4MmTXWyO4bL3f3UtL+3Un4+Z0LzD2Wxeu8gUAcT84IBT

VbDMIPQeaTo16LOLhzlz+oCWJxQBdn4r2qZ0j+VgrdMcjq8WJo1kJ9jAdR0acBztOe4ZWmd66oijzFzl

XZOZPx4v3F/xgzsPrw1XdblV1SmUlM1r3dgTpHCIn3
1tOwbH7rn8sJ5G9VisGtVr8sysA62MkJ6EqPgH2uAwUmFmpDWXrOmZPCMomw5SX5D5hVTJlMahyTfYyW

TqvRDBidLOkvNGTFT1N+CDxoev+yUdP66WZVHSYa1ptvcjxbOqw8WVy3KElfoA+Cl1DBqQ4aMIy42Npw

DaRbfbdjM8ZmuDaRWj8o79mO/HWQM/W0LuJ1W+57Tc
Y5EfJHdPcADt0moBG+z3wYA8ohjjKcTVFicKfmPGObawzxtF7A/Vv5+m6psKfWDBrJ4K30TUt9cxgsrM

nL+RTideCeZxAEqNM5Dc0bupVohrEu4lB/VUmAeuLD+BUdyGfO1J8XC48RYpH5E3zZx2eGubU2TtO5ql

jydyGGF8GMb84Qgc68fq/erAJg4gVszlUso/5ooF19
433VVzesfnMFFyYEg5sjI+7kPpNJE7pFcbHEd+M/OkXAxPToo9CWD2RaRtgcdV06LNjzMrDRSda0pkJt

ieivlAu1lGbL/wc3FACjYSBZSzYJOkNADB9EUJS2aXoOHe3ZisHRAMXaX+1SDlG01Q9VufNlGJ0vEvVs

/3g8+AFJbbgI0rHp0EF5VV0q01AoGc15O/DFfHEncY
0CHEoIbDu+9gIm0Q1LDAPUwhoLkM+wGiXDFnw9USpnk3wJm9QYmcf3YifufFjHFKHUlRAMFm6WWIUh6Z

JGZpx0w8yjHKT5u0dKa4xpZ3pXOqXkWJVFsGO5zHLjy3H3d5iWgYiL75mEgztZ5Rp9G7Wp7Rdu2ale8r

ycodx3Q/cOrENBakUs3IZAL4NTSUR4LyIMzbDa0oNL
FuE46NDe95yeLs1HgC8phGUIckK4ar31nk6t0RMDLvc7UJQA+tT2HMo+IeJR/sQofc3/9b1h/dfRf3yr

3FH44wU7TzcSwleDekQGCrWxXC0DUiIJY3QUzbDS3raMsFDTFhzNeE9jB2Ef3jMNMQYtgMckhUwj4eQ5

vjc3fLF4WaeHEoiTy4hDrlhRDf/aeQWkYMbQWmNKb3
xDp55C3ftFJlss1PSXmX+krdo7a3OVs+W3WupBwqN2tGAGB40pCIStDlT2mh71hIp2n5iE2zzW5q+d7G

Xv12zoGf4jM4O6jhPr4OFdOC7TGNfUw2m6/jnqN95XLsO8atXggmE3PdpoftgKcZ9RTG3VJhRJmPWDBJ

BobWCkAIPnpuVi6jLqtxGDKYpBmhu5wAl8RkkF6F/2
3gYeo5H6S6+dPJBJJoMflpQv32R+nlzYZUwKzBZX/Yls6yHzdrYDk1zJqSqcqHxjtqkL8drSSZ8zkmdQ

WHx0p77wujSsLTKDlDC7mJVElM8KjZTC1TMCIlBUQJpTAdefCUcd5R+b+IC56gtBW2k9jOppHEN+XGQ2

2X5Zy3uzEn+ZMw+LCfuu4gZzlJEvC3pcemnYXiVQqF
dzORGujLYVExPq36q5KMAQgi5MEESqP+xYoR+3vFjGGQ9J3sp7qd5aw6LRDb3Y1c0jh97ByNFRraf4W8

iHrE8xnDfTXvvmP2x2v0Zu8CG+/6WqMeScR0isG9ulFtQpbgc43cKFPE2wlGE+1iIkM8L4+XC7EkHxIQ

ek8XbvLYAIp3yiOCelsEFrifc2GLk8w6wcAd+setXg
2N5MIxq59y62Kf/e50uvfaffC/TsvCDJjb+ZpCCM90zsclgLqj0OmnZw+dLVc19IWpMEyXaisqOZfY4K

MIEoHAj0/nl6LG5jzal95IZTGkincJ2dUANym7kQa9dJ2+bdEoLJrH7OwUgT62TipHmKYMsZrsHUI3oU

0r6kM1IxU+kifGcUjdR1xD4Qw1v/J/jC3qTjjSIzw0
mWm3Ir8We3QgwG6BJGZUG4gvYVwE86zXzaN8FjRAhB/giuXQ3z2dvGnAgKkJbbulyuy61TuIAV+iVFDT

aGDBqlF2jl2N9PMuOSEaH+DxnzUlQ6oQ99JGpbkcbr/q/x0A0JmjYUlH2Hu9lif9JPzCxbiBLH07hLQT

/KuDgd4TkTjEDa/MK6JwZkkgeezae41ivJ57AaP/F8
nG/QbjMWW/xTzffvgmnfdqI6XcWxjMM4044ai3TbT0Ud74weGPDf51dogVLNy/4MZh+2sDHnSVE54fB2

9bGxoLs8bG7gwAb/MoXEy2CcX9TyHC524mexpFs5V8mgFqN/cBFUIgKiyhRQfjHeoORzCuzInVFy8tbR

EzZ0KUwvs3Vxuj2YProMQ5kg/cldS047BZCJE4KkAn
h31h38ktXSpnejs8lXyN2cZJZ80yK5OOTkgiJpAAtv8G8nHFJF52UtptwD6PeZJntcsXw+uNT78geRmq

20VMwS3LgcqK8BsLJyD3ciAQEFQ0b/gRqbwiucXBB08yxcX5hLpCYDnzyxjC5M7tyrFydqb6anptuiyI

gxgRsOZOvZCy5a3aKU1b4THivqeX2cCjHAcYbDuOlp
0gDbAGvXXDwo2fa+odmGSLQQmENN5FyYJhA8djeyMgW9kY1LhSZA2B/KyaZqrrrGhwpHagGLfZIu+JDL

pGl+0CyPxJleuGk3m0UQcaxQ9l55fdzm3MTEitWsc10axZxx+SrtlzE/usZs8s5/HuRNZ23ZHrPuBaWu

ggvGsskx7//rd7J7jdY46Lz9vl0Mi0oSoARlRE8quF
4/E4e2dP7O8PmSN/BVpUW50AW+k7b4zI9OEgVbehStlRUIwoWcwX3maaFOVPvZjynZBnaLfuRMnXkR0p

LvsmQUxmva3uyMf4rUYzD/BYJGZJyvCnmp6u+zUcmWGnL+x2xfRp+pya5C2TqmQuIqsEXGjj7RxRsqE9

rjfWQs8E4srfpqjb3Q9KapmvBZSlTLR1i3StTup7By
hrcmXeSzQIfYW2qX/0iSbR/VPAV38D+OXlGfzmU6og4MRcRdCHnHhtayYKqHbFBHpAhZmXPh08eGX/yM

ze3n4Tu4dUMkpQFJ/RY44bbkPRfS00Dsq+YThX/3Q368awt/gC7U0rg+GSX7MPfJT81y9uk1TV7FOpmS

o+9jLuyWsSbscdB5HsjN4ur61YS/hNaBtdwMadAEHm
cdrpEbqpsUJTUt7Owfo1o3VMmKeQhFGB7ZIDHPTOoAx5oYAJ2e+MNnYHL0he+tMxHL5D+scMeV4J9snw

NxNLBIEzdZn0Vt5w5Fqfpe1qtPaBzlOHODi+rlaN+um442Qr+iK8psfClxhv2mv/wiTuPx2dPXsFt471

tZk36gv9GWWzWG3sZjR2ARVI5DSamHbRyQiqyZoAge
qYwvqESJuenQ/QUUqqTJKEaiw/kL3FmjR3PFHG0Ce1qz5EzvNyCTT7ooXrhbYrKLiaG1Ai3MoVhAzRLG

Hi6Qg/fbjavV3wy0Xn+GODpxU6ze9e05zYW0seOu6fBY7HGqfcEOwZCwUQyjEY9357uPZi1fMtbLyTXy

svW11kmQgND+ZJ9mzUi/bnIltiYpUV3WtMUCWvXg89
nZTe+IG8KtNMEpYXWqYkftJbIOt18UCU5CfwR9vmVpQpQgG5BalAh51F2eAwree8ejAjOEzZKcPfMhbz

ACFuCn9IbEhC0FqbsH/HnucBfiSJ6ERymuiwvgqYGzIOAh6E8GrPL2H3tAsLS2Yw45TOzI5b67Ix3J1q

DnpdbF/6HoJLb9FXfcv9cQB4Jbytdy3j3PWnnTVN+Z
thhJbSkUtFasm+r4NZjd9FmcQ6dAGki4eVONSHqReskAlLx/3rH0gKDWpjkMhtEPY0n10HBJWnwqp+H1

x8mXs7aWmaCW7AsoiCs8q9S9TfH9YQMMyLL6PaaTzBrA3G+wDl9bL14xgK1iiX94UUhoz/ysk3Id7IUy

qr9Qd0RV9Gr0AH/MJdkTpWe8rkco2X2ildp6imykp9
/wKMN6RJL0AU+mba3umG/i0KyWuktE9SgNKIt+zQMAzTNJbjYlRIiu1htlupVDktHCl0fodXspw37tah

UEYDjYU1LDMuALpmOlvX1sMQ7ejEkuMvnmXHiQTxz/cOnO5C3odHglsXtaQzhzdtr4sM2tbmWuPqWg5A

eB9ByR1+bxA+sgzBH01MUsaA4Jg6G/WYgZkQw56fx/
c6Q+G7b+odCUF/Q94P9oWoKr0TWq9IMRONfPQnuRoZGqHSLFgI89qb/5BZ8opP1T9f8c8nrGuSGwo18d

bLnxGvK3T71/W8nTVGK6p3/5qZOnwJLSBseC/dPjXfcG7SYUi3/8c4BppR3/R2djmr0ufmiB7sxJPFVx

1oqD5/TzYJ/V5uTlLHQngUBWtOaV5Njtb+ZsXigtXw
FNgOYll6M7a/WxNP2jO0Lz3wMx3jPnAHkR9wqoea+4il/cGlBgxugR3+4Dd+0PRo6DHGQtliWMxiqTu+

oHlBOpjG4qhR9tk4cOQdpez1aiWGurSDv/DNpzIvRHBg6cRm2iKjcfTiqJgWGmsv8VbXjMxL2OI9UKG4

eXrpHucOx3vjdpnJS6NboVBAYM8QC7BzOU/15O9SOz
v3tUQ4hQBfC2xzX2SqEXy+kf8z/ucroO95RwgoniAfxsgouN7fbbrtZCDXlANahPp82OEhgvk925qoLT

D5gZGN9CBHA5NS29qkKdQiVFx3hHMeYGZhu9Ltp6kT59nn8k6AgTzV/l+e7H+lScrLIsYf3aHMDV2a/r

/ILPUHKVepfCGLvnxG4dSBFKjJ7HQ9b67f4mzQcOw9
EcTetlJhAZT3LagZCcwtSLyi//tHTiv0nwqIOXLxl26k7V/OUo4XbGyz0ewqUSkhobzAZEMfe8/10Wz4

mPMT8t+fFJAgYaoLQ2EQa6j7abekBgBMt65Frmx+BslCtw5172XpYik72ymX0N255TJ9AddGizo8788J

dpPyYL3kpre8aut5vv+lw0TlO82FM90QTtCLXgXeZw
pbZR/I+EYKWHN+PfbhuXRqbpUgrQhMaKeHasHLEW+f/ceOj/t/87TAfv+59kTgId0+TUsyUhcwDm3vX1

iQa9sHY+b0Fkd94tNU2GNepRnRVR3wLrnQq/49fU9vaDX6ri7Qp7FiLc0KBBEqrTG3xHSoREGkVrZqk9

RI4ZViCEfGJh10Lo6tNkSBDDN5+trMzek1t6SvPwTy
ju91Qcm+FkvukefDrQtYRFpPK0VkPobtruKIzgkJdl8H+qZsDu6bABQa20f+ruZRC/bSEHaehjaqDrNO

F7zzsscJqptTOzUmMrtt7K04KbscXKJsEWfTHLxEoQi/gO2IFjq5EXyoqIKj9UevdEooO+0lXgUEVELj

a6jfb6aTP8faN2uLePtG/3aMK95DjzzHANp3yFI7y3
hJYFoRbjwYXZb/CTUjIlqTa6/sRTfZC8OeNQekxBQ6uPssIXI/3ZKGxcG8YEzl7qD0bpAq2BzeQTd6yN

727BQ9M7XV+eBjcZykFjrrXowPWxyzONv9+x63gEWNOtK3uq0tGH/2wd9wqk99sZjTtNdoi+QyrNe9yV

+mTqRGg+t6ON/KOq/4NhABh88WS1wJrF5mPgVjIxlh
0fwLqxN6Y204Me3qr3kX4lmOKi8xaIp2X9OaSXth4GhaycPlbJ8BJLl7Zs+q+eoLJjIbbJacd8rHlq8H

NlAEOpKA9DPw9gQjSA2nd8W+jOO2dX9HjmKHttiB085wfdv6+dIxE1SfbGUtOc9ilcjm2JWJQYwsls3s

oRdH4cLUBMdo5+qJH67U48Wvq5ZY0aCeMl4iIke9NU
9TYQOxTfv0mmBnPpdZmD5fdc8v1jPopx95PVrAM2VjZTJKMtkAhVyxn2bnRwfU0+MgcmYZEI4G8VMmLr

O4eEFDTDebdb5yaAZtK7sCLtiW55aPVbGkexcSR9xPewq/qGVvMUfMBV5YAnbWY9YzIkSXrvjKSh1w6F

EQDVW6fSKWUowqqvCvxrVSODhG9jxnZdpl2GBDyg+G
2b+OBfxFr3FFsqVk077NV9cqnLvw4dX0rwNMOrD4ZpAcDAi8YbAYTtTaqO5GiwHhyedwqkuigNsyDhOa

c9oseM1WmMVKvPRZrInq3VYlkRXMDtQlmzl3STf1CVOpH53gSEyJB+rMU56lS1iGJMvn+beiFDdbHxe6

ODaph1avdf1kzcLyMEg36VrYqi+haDNDHq9Qn1LFpN
7UlNy1waQCm6YCtCSQlNhzrVNnG1Sdn4jsDxz31Eag/y2Jz8yu4MdngLKNHAttyY9X9ICfAavLztlJqe

SpfaL1Z64yd6Gx/+SrpRQndXDf+JlFl0tlmTgcEn6G8pDPtq1g1KuTWP+F0wV16wyMitSEeLllADCHGL

c42LQ7qbD7P/yozins0HzupOOAAKbZBSoHe1kc9Prp
6Syixz04L/9QjDo8TSqIPSuQ0+tXTHRi/djEWN+Mtz9fgrJhim9FMvce56s2VXLCea0NxythvmScJe6i

TuSJgJGNQ1Vcn34wRYsMJuIggCiTlRynQK1utvFpIZOiLqsTMbS1CsBK/Q2vjeuXG0NjIn3si8UNV9FT

MAoF5EdmC1qDfSgq8Kr5268wjiJAsI59mabQVcBbvS
30tOOJeu7sueBNxCSdlFJZDOO41Yxgh99j3c5vSkNQybReNHhLgMoqHSHpDDsBdb20ld9y4dc24wnfmX

LYiIEZffb8W3stJepkJ1FrTsA6O+MZjoSj9C2qokNiovZRAFnGicp/XWxSlI41hkSRZBnyartJBgNfXu

vTz7ZC2bmYY+pntlF8AX2uuuRN3jcdwr50ri1RSQEg
MI6Bi7Lzsq82b5w1anp14qLGBNq//WsTpRXq51hOCSCF7Q4NAtxGoCN18HfOctg95zBUvGl6M1PSPodR

9T0DblcbtwXA9oaC+I0HYtB478gJOx0KA0eir4Ptwa9GQB5lL01X/EF15mYqPQpLfU+FRUSKXt5N1oE7

uj6MLgyQCdklR+ggqVlmLkorNMnQ7EtTwmFBWHjGJo
dG+hHhK7MlCC9X3XF6NiTF4oCZ7aOZTCkof9dCqFgnzL4Vh0hZ6k6oXXXmk8dnxUZwf7FSKEPip5slsv

GDVo2gwkqySz/pnjTLKGDkYW1QZqbdJvXQDmCNu7FuGt+4CSBwZbg6nDe6DNngs9i+cD5ts/OKPE59gH

uEMJufkK0G1pLQS6cVnMP2Q4gmLo9idDYIinJpQION
iAf1pyBDLvQLjt/tqG2b/sJSxYSdXnXdjvp7hCD9CA2TuatVNz24CwH9hb4ftZHVr4QonXxThRhL/Dileep

ykceCa/sMZSM6O1c34IzQVmQfe1uKNXUC4qbj9gZyEElLTXUFMQfOHreoIpe9cswA5y6+uveiCOlHwC4

wglT54iJiaVwdoidZls2sHCJ1qa3y6ht54e7hnnGQx
b9Zyq83HzO6PHqe7vDsLK415Jk1QcWCTcfwUHawOjnML7ZsnVgFkVhCpX2GakIZego/JHYbFf3L41f+I

3qoVD1bbU4sUk84eteFZ//HULyliWp6d2gbikjmbBMR68Vedkfe8uce7wAitoRizSHiYYnp7sMKwxYxr

ORTu5HWHKRkCN9aLU18vflKFwrMhOlSMIGUlUsRVXj
Fx62aPGyGyTP1JAihFuM3yeKeUt8STtS7rEhWYE/NYig+hMt45noOUyA3rtbS89A88RTjRvABBqgVAgq

1iXmb8La0TqmhLP0zu7ETgI9RxcGQpRfW6lV+jre/DZzaFaYSrorvsp/Pm5L25n6UnA2aaFaAipfTBmq

V0VOganF6YtxHQ9UnZ7VNjSNrGHYlP5Lrvw6/wy08x
ljZ1PNzUpcxzrvktbWOlOXGfl2M6O8yZ5txML954MpnHEfvnBuuNMf446QiuQK3aAEL22xRSAJuSrpW/

DWHsRTT3KZ1br/cclO8tRYdLAg0JdY8iEhRQOl5Fg4Qs/j4Say6Esn58Ni7BCo4yMDLgXbRsXy8cjs+y

1VN5DVRhA/HLhUQrlIivfQPnOrBhgzWQo58QIjYMDs
dQ+kF8M3fFA0LVSW2D/q5hmkaM1THW303JsAYDh91VkUkjZwxTdvYw2Pry2oIlBrdzXEqQK54ybLBIuB

nOg7vAgWECcychGqMd8so2EFpD1Dj09CNVDHEYqIjP8kmXZcHa0ZbXjXzVpufyL9c7LWPqaT9J9Bcu3A

TLb7+g5RifBCPjuZMqTJ10hDYsHHJKsDVhSy2LM+H9
klfrvKIeW1PtttHe3MWRuuGXETrA/bz6UzVCfelXy4vGD0zFd+IFNIaBqx2lctcMSiy2LyY12B0cX8bK

xHUoVCF/zUggxIQ0DzqM9LJUy37nvT0XzmZ7PrqcSKV0VfnhUP2FR2lJ19YNbbSVD4lkY+P+EIzJ0gOB

SH/p9yYRq6v8yf2ZB4TOeP0q/UTR8etZ4q/Z0zgYSb
oxIWUy3CkBwaCwbIny8LajjxUtbOULjs4XXhnbxlKHfT9NA9aYTRvagQeK07tv5NywEgfCnaF/AKxDMD

JD8z8gmaHXWLDojZkt8lvvSPQGicDZHvv3sc1t5QoQfMYSxd3Avaqrr1oGCPqpWWRJqwTFdhRE6QKQMc

w/ZQGVeqrI9pJHNLVJJmD/nVvQHQmk2lEb4qz6TTnB
B11G0TO4HJi2jAPq2+i3bvNLIqnLhJzYoeCw+ym/IR6nkSLbjQ9xhnH7qdz+PYUi0to3j/zSk1Z62aXr

1j8Oxqf0HBEGe03/nKxPfd7vqHK9r4Guc6wu23Eteird0hXgaFNffFH1jeDtKpxBcS0dMRLeCBv9a3kX

N4DXOct1YJ8Yms8A0ww4fOcZpfYB+/0ub+pvyhvOZU
Hz0VhwDgBzw0UQQHGEHMQoJRi8ZoJUbv/75GNtofNMzU+G3k00XBZwfUqk5B7wKci/0j7qy2al7jjq5b

vjdX43QV/ioGsDDo7+lFCfQ8QodOgEneI9ej937yA6M5xiZ63yjbH+2T8FChsh1SZLacgsmyWx9DHbNA

HUNj7V7h9QILe/Isb4/r6iqcrnQMXfoErxKTjJAFtE
sZ/+/qoPgnTalE7kjbDayvclXb14Ia4UKn0V6QMsAFhL2afWBDzuFceW3lJYQnbh3YF+xZxZ4VF8zm3+

LpgyMeflmEWvpkRvgDiLTg4DEYI61PKcHGUIunAz6oA4l1vlUB6GRjVyOe6e6nfGY4otv7UpJHYi6ohY

uuIX3GW9PiZLOWvb8utzwpE+301g6TP5VgY2fbmXz6
3Ta7vgcQ7BSsdBfeomuh5ASSLte85sbtwPeEMPAGLeWG4ginxdnBlyg6+EFcsrdg0BBsmq8fdnvzlLUZ

pktxaNqvy2c1veMw0hzcL0AJxj8EK0CaqrUHPVdgDrhUPqzcxR2qfiewXjR/ozYMD7oy/5Y+qMp5bgeI

tbmUpMnrrJGL6yRevZaxN5M3/T1P3WzfhAhGUSpA/t
CK3LLNygHHBeyxxrDr4JXCGj1Gen2sojD+ni/6R9cqfJX6e6FlcFdDR4J6rRSqmA3BX4vYCSCGKTaWYY

JVdMrAYjKgV0MxcsHGYSxRoBYuDS8/hnb891Locp+k/Cv2Xvkt1vhp89mo/7VZ4pacUW0J5GqiIudNB7

Xmx39r8VfvFzs5CiOG6yYVwswf9vyfc/H518Lqs3xd
J886C7ZZXIMVy1J6/CRoxO7zkPo06OVBrwukRxC/0EhSc7veSVRcY6Ydb7gHjmCfMWkBN5vTOXQoVHsb

JitVAm+MHU1MldAg71qGNGh51AyGMxFlE2+IPhsy1+wkC1ydxXZfZobjx/nX7L0SHgeYTyBYK03zyfqi

b4uu5WvzZcexIBcio0imYdOqA/3eqANdSftE4GmzIZ
2gt5Z3SG7cDdnnU5FtpVs0wg6/WTlcSrE5s+mDp0WEEx435YGz0q6Q3tYtQ62d2y2ogD7XQGPuvbfFgl

1XXuja+IPl+UXphBnjMsyn9lhMj1b4xa2SuZmMnQ+POAmQu/aGCjEZxV3bA8ob106pkNf80lZQukrlxB

/N9N56ARk1t/IbSRTrYJ+1KG6IcxhuzGMtvDM2UhRO
g1+X2cswglMd06Lb720vCCbYnmNphwO8eDMG+VSJks4pgR3PXy7pkYg0L8JzlzM4cjqi3fSp3YoXECNH

TuIS0Hk5I9a/a4wxl4hcnjc/HHp+Id1JYotpX5KhOIhuUhu8x50GVr4eISJ+PBaRW7uH711QcDmRlC5B

r0kLSENDcnK0NE7ghhvSGZHxa0bge3S0iZQxSmRDrZ
LVoLF5ogqg1DNF0fSfDwezOH5wPF9f6uUhSwvSKqDj26XrWF/IpkMTFdDMZfFRNC+pjcMIaix4uHMoDd

h6esfG6buvVX9aR78Sm3FXBUUsfkauDUg1vO12ERzfsnPJeHqbbHXuhdWfWycQLjbBoUzXXTJZAmr2KT

iuWzW+V8pZ3VxjIcoAIbrwvGN5Uv4KlpXAI3lD4jej
sHsJxLcNB7H1FJuwBAmNZuJW8o/ok0BBHj/knfqCf4oZLdkwsvsxAfszvJ34eQSbp2GEADRAdkwXXIPu

mS60VBjykEECuM3TSUR4DZk8wIkna7yLPnyS5U6XY0yBoVuVv+H0g/78/mNQSPou5eMJLCFHil2+AQGH

k7xYO2G5MDWdvIQrr1ElnUx8y7WlCHN2gwEKUl5jJA
PxdBlQu/ytDLaJXXQF0eKSxtYD3mf8nfIv30/iecEZczWgSnfF/Y4Kf2NBHSAKcMstvU9iYg1Ug5dml3

HaFRrsTkitloRjOkuD1QCJ+E6BbU6lMK0yvDr98i0Qgn8Hd+jutBsmsGjnth+5LHOK/1uFpbh+rZWSi9

n5r2Xdu4LcnAVIm7jIbKMUEHVj+6iOZ7xmNd/niULT
C2eFtE3itJtdX73w8j/BBN5ZaVotAlM757SBElfLFBo02PDmpFlkT0BBb83gGwIllM2u0dKJIalCJn/j

PKpObr003Cq625s4M6INbzRg9KUc2H8MfSSQZ0YXLapu9uUdVYDN2t2EsQDJSobQbfIw5fRoNtSK7TjD

SJ2u2y0pGBfyqJ42yT306uxTKnX8pjJ/z/dFn/PxkI
8YRXQaKINK6iEOQ6IOxHd2oeNUmGBAb+pNYsAbrJ9mlThSuKxq07j+jzMA9d668aIKF+6ctMVIDWGV2C

XGxkzkCBVoc2iAUvHXjT98xTNeQpSdezLw8CWd+jAZKOK/0DQcWU0C0vrRQo+KY5cmd07BegwoYg/urq

VnWhinzlxOnFOUdzkqZ+ASO73OhJ81FO7sK2yd2zaz
euh4nO+OkSzwtTy1bwG1R63ql/31/kqME3WvHKY4BlaIbPAGD1LQSjog7ngFSxkdGIG/z4Y0uABqr6fU

djltP3i7ea/x0jmzptRNYofUJhdjJ5yTumRfEP2PufspdndGDOXG5ooKTtvKLdE5LeEMSQweuHM3i17u

3GoEfSi1wDX3cCPf7Be2K8DdC4237O9HCaUu/gwNNb
Q3HZkXKpD4EL7XjREWQ605nsl9JGwZeV3s6dmqDRS0RT4dqJTmbrhTM6ylz/Y/iklYVxXhjfsZPGoOir

LdtXfaIK8qK+8CmsA7OuYxoog9moczePiq0zPro5beNR3jTJiEpYbt/AvgnNdL6D5dquIyaVtgUf02pe

9xC+YVzRDSXA96+Udk476x5nDWrbb35NpxIWGNrEXk
E9UX/rUuW1LCzfcXJMAtFzrf0l14jffc6d6MqijrbksnyeOcHsScZ0Q+NWGan8m/fAjAqdK8OFYBQqpz

4APFMr4NYBHSCyi5L80Ch8si0MDUk2t+pYNUKe6keqjPFAXux3s4+LVsy7vQB1wE8ZA2ph479daWrSCX

ui6sYofsjgwkEK+fI/55ch0nTR1Qdsyud/7Ze1Nr9K
pInskfdGgxU7TtGEN/ePZldP6Yi4YDiZp6GoB9VQI3GuyLfr/qUg1cqiBjBu1Q74W5gGHb/oIx1Ah3Tm

lbwGQryK62HSlOfuZq4XEIWaQZfy3GJ6dqUwRBv5cey71Ahq9wIU6LmsPrghsS4+Gn/Efn2iSzfePDiw

aREmdV5QBWBc5Cwr0SvlhPvi1iNPmqRZbJ5Li18j+n
AsrymFkFqI8Rq4B4TfyegZuA/xHBLCDDdljCzOLY2o2C+k4NQffePzsnzo3DG7Z/rhDw35DxLlNnQx4x

71WLExBnAUeHSvKWhnmtSojuZHMmhwpSd4ZNnpCCzh5P4qEZUGGjbN3YAB0PyvcAq8jj/bI+WNe4XwaC

n61+xtd101gL2+Jxf0PHx6xMlXh4Thot1dqvk8JfrW
Ws1aDMBbd6Cl23vDZRj6byj9EXztf5zWaT3Ktpc9mnXmsf923c3jiHl/JP9cIzreXEdS2NalpEtJfd7y

Ictruph9/vgpNsIc5NfSRw819guo2iCfTnT8LAlfB7v+T1nixQ8fYYq0G2K2I+RBBTq5vp5nWYlKl/N0

/WfrMxH93ZrcjwX442kjDHSSeo6k8F4haq5Rntgl0w
I+Pw9z2uB2mVNSNvdrp9PigD6dE1ODqDWKgfggr+W1X5UmSXzg7Rw8/7pTV9dehfHZDb/lvFyQMkeeXi

vO+bPWSp/9vbzrIK9PgWauZ0k8/igGb4kDDPDdIpxt8ugqTYOktdQ7C3I8BptktkoYQuo9fZlg+6UwRI

bIOwj8zld1B/LLCCB5T7QAqmaSlS9k/XVPZt4aBvTO
2XzCrEnVSD+f7yNr/0T3u84liAxWxVKVqLzamMf14tMh0dWv4EtS9rUpAd8uEbUiN1RovEsINvhLIt25

KVACA5bvF3cJi8ZpKfAwdy/yz4Y24r834R9AwRcT9atX4FiELEwoP/nL2DNblfOWDYJ9f78kLbDT2Wp5

Px/NX+wEH4jM3T4iBrRicbry9ZdFq7h81hlELVD2IY
o40EOFbFHPTQdJpG3DuEMtmhENoCe11yQNSFBINB2COobhEMmGyyNOSJV7ui43Z5StWtjj6D28rqckih

+KsENIE5+uSwsyEeiz2bB+qFZGawbDsuH4mkkX9bIvNRzSlgsZwMz9wvECAsbtgCyjMo3vsna2cw74j5

lJNyHb7+wjHOdQQDI4QLcoJv017p3oT7eJ4NjafmFP
H0ne5pINVSAaiG72r0qHwsH1izKyFRsEXI/sbp3fLjc876f2nU/Log26qjygwPhtlO1cj1mHn92H/0hr

23gcl35f6xHE3MVCnfWQ2IcVGHDk/1NQAQrFc7L1gBaLZJihlIaB/Rpirx0/M07DFgiYda82HLp5i6ng

hhJKZcDQZDEnpdwEWH+HJWV37F/w/4XfEqrxKLY46U
O/WjBvvLsx39cxFOD9JwK2yf9GJqqOZERQ1LguY1zVvoE+VPXv6ucmnJF7HK39KFHyheqNCJc4L9hUDv

uzzKkhRSn2WVHN80pEo/lRokfIfDSX9D7aQpRDj3pXkA2L1RXRSZaum1HTFCW6aRmSMmD/EO0DvcTZLB

s2VmEburp3fuRiosanVy6LmuIv8LSJwiBLNWLmqdmk
KwHHkKxeRQlYwTIvhuOFIXKgvNgiEfcxTF436/XtpW9VXVamMTA7yJe+Xn24gTWjawUapEvv9AULrrgg

TtfeXngTaimO6fmRS255Ltuacdr5H9upGjwtdDPY+i5D/O6561xv32UbXg+C+7wd3tcznIitpuDkQvQa

3vfe1gO2Dc1o2+yzXASnd75fu6+RC3H5QQpTVsDdcE
aKCjh24Ygq7Ey8bPjdzoujo7JV09Y7QEptvTOjozXl6i8AN6uYg7Fpt6aXdu7nzaULyiNq70Mu0C2aaY

nyD2sAjfJTbvJKPnk0BI6NH6MqJgnM3KetJ/23rkqxrDs0L01eeh+G0XIKJ7c94l7SZ9QMuO47MQMVjB

1lK24FbJ8QymQ3N69dehk+IWWHSMgcRVeUyz0/Vsk7
w85/rdJ+aDnIvhYUhLEhada7W5jaFueNJLFi18X6JKgpjsKEpCEExpofZ3c4h4WED0rldfF+/W2rBE7j

0fwlumRdoDdB60i0V9Tj7/rGj8vVfrxoInqxB/AC/lvDGAGF6nXBfIKvWsU4Ql4Hl7S+WVHJkZf9buXr

LGQpuQ/a7OtMb0jckPSFy5FRHfWT9KW+nUzPd9d2jt
ApAYrLv+3B0dX4tuer0rDdXchZVzCUMZBmt6UtNsdYqdyeHv3XAryY6s3d1KR5auLiO2ZaWHSbP+dAF7

ee2uU/kz4LW6q9C03RBYudvR3IY9P6mBXuZI71oJQzxMM37u5CopZooAI3OtHqsCa2unFeD0efk1jhNU

tum02JdYJj5t6x4qGToWuBRNjj9M0yTTcTotFML0ZC
2Xzbjvn2Y72mlu/7H5+dM6ui6lEWfeq48Rk6/jeu7H/brIGzJXXgexRwXitgCT8530+jkD5q3roX8Utb

Sef7xAAFFIf5BAvp3G/hEQC1RQOPocK1Yasr//SxpfS44P3x5jfYkKBBr9Bq30mQMbtOyylg6m/fvSp7

zPEdgqJCKVco1W6V9q6ANbNlpEEQKrY4AkoNnCE+W2
yX72ZpXJs/+jzOs1AMpsVSI9sThwur1OAwQspPlUyA1Hm7iIB2aFlodPQkO/G+DmFJxfJ7DhbW8VMysi

UrX1dSCAf3gMGDFm/7JYwZvnO23q9sqp9k9SptY/mIYdEa9XYWPmuP1pC5M3nn6uYy/bKKU4UzyQsQUi

T9GilTzjWFVzKO9uJZrJCnDs3jVbpta0w6uuZWfH09
3l8LXWcRY+hATBhpD0CsE7QZyUWyJlzz1zU+cscI3AZMSnciafEVhHpByXNOPr3ruV5nnNMhfrH08o/K

3d2ZD7F0XoigoIWZxpvKDBpH3v4BSMzjYaE4huMgZ5ht7mk7WaMbQ3ZEyTBDoI4FMHnjqZBFd2+v50l8

z/UE3tQMDGioz7Zj4HANhYn/9BIu5cO2fWIE7ECgB0
MtYOTIScx94sPun/fqpn7ezjMdm7unesIYtvFUWWN9qhWL+8KoTHm1t9GnhEo7VzJnIxNgU/TUstp6b2

b6jEwQweRfMuACCm/qXH69tAsJxoceUZcHFkBWFRyAomK3XGFCDDQ+2upPrKawgynwCKx1H/BHNUgoO9

8FFSKU6OjxL8+p2n6cl0xydN6ILK9+2DNGkdZLn2yI
5puTljql0BGLfQkt3+3BahyZQJ0MAc4OB5RMG9avokC47k5ZKgh1bbsQ7A+P7QGnZ7q4ZkZvSgiDYuqu

9yJOGGp/LxOJQGNzrh6cz+Ke0B+XZYKxoez74I5eHEsfAU4IJhasaaoddvmbAXNaq/W+u6TLanMYzmZZ

/oqKoJaXQnccATD5ELYqio80updIJuZjgb+pTBY/FL
1eNYck7034cmCGJr5sp8upTWmmIEjOutenzdSbA33P9i0agXOVgOy78JWdAst2uPz6Krez2IrQhnpM2n

f0O8wujHhO6T1v5Jl2v61CCSBY+VHab2XI6bzt2ww3JDnN+LWwbzJF4ZUUkWEWzDh/ZJLRt4TK9hVn9J

OEK5PBboYTjful86JyObHifAfLJe9fDcdjoVyEwFka
tKNyiwhDrwGfVtPox4LZ0m/je7LbRV3nvD5hCKBiJ+mKzlyWW3x0y5aB2NMUD9dHWnam1iRygHGdSV23

Bsi+/NVfnTXsy2reVvP6wyt4a/tl2zkhADQ8D9K6gJvCXz/C4DQFuWcHyX8etM5deAjSh3FvmGYJZI8M

vOEoS+FLXP1poiddTfvcP2JA5tnULiaq5Cp6Yglp65
/SixzLL/U39PqZgyEiOUa8AZZGkx0r2BumNMEgnxe46/mGOUQxK14+xPM46RjAphO+ybmmHr2QvxPtSq

xirI5CxqfVDXTQoMngbvxuysag0td9WJt81KknG052F5xOuX7uRzIY6pLiG/B+f2B/sU7zP/NKhTAg5f

2namX5HyK/ImPpqqDxhTrIdmL+r1pAZg3g2HJTyj9v
dWsgi0iYl2HcTZ3adxQks3FmrQlR9xE8DizaMHIrP4JRgrq8pJELw0TV7aaDba1YytIevqu0s6hUbM4h

feG9JzVv3CukkJW/jbtopHP3RaawR0VQrgI5d6UY/z/4kcLweZW/abuSAbk2nfmNGD9+VkvQk9kdL1TH

hTdAcKY9ZAma3gRx1EdK6GDON0ZiFtAutaMjW2EgJ9
9XN/BJFtZmgB98+wOeywUp86z8sJyfpKxLDQ1+/QRkxGHERvE00aAduwj7xqpd0dmASc9Xomz+DfzepK

FhhtSwu7UtKgbpfZC3cRnF1YQAlZlmQ2UlwAAICshDA/4koFK+30dNFwfFjpIaCQ2QM2RqjS9QSMCa5R

30uVdWVuzN/CeM2ALG7no38WNLcEprnBoPSpHcvabc
Dddqpg1AGyxhmaAebEivy/g2cVakDYYZxsw4H8VxH1FxzM+G89w7/3DOHCGz8Y8pPPy0eWbGmPPAyH6u

JmlPUE08KQxr1SnmGgqSlWqn+wpIDIed6rcfjYw8FQbkkxwabaHFc5J+EnU/UfmniWXYf/e11nt/jlQ7

OCMZoppcPWrMS6HzvV3l6LaALIFhYusmAYfrPXq1Yf
04sSkWSASDerAD3XHhoBvWCxA8eNXIvnmB5XKy/OG4SaVn6UfrR/LScveCb/7Q27xalB8NXHhGP645YO

il2YTufj1bq+y+aQ7o4ZBpRzlzr5jkfQU9QETUVl0E1mDi7tKrAslZhrKDrfsU135/I2oiczuCz4XGm6

LLaXHApN1nkaH20YnVgHA0nz1wpBCFlJ3GT630x/Yk
M9AYAf6t+moldm+h84IyCYg3XFHklo8h6+9WIC8kxGVlwPmY2egu7O6HiRajRqW4jQVyoHfikcA2ZgZe

/C9kDpB4O6//v84ybOlfr2i5vLoxK5wYb0WVhHFUvQPBwEam2hUqdKp4KriIQ3RHSNAklFFqGDDfkO3M

4C/1CjKs1Vvwr5iLzrc1HbIwwxUeZl1IFRJ8pq6W6W
NToKtSTCSm/W06j5yo/D+IztHnspwb3TI+UAeh6bKXjVwdjgDBlg1iWq0xPo7kE6BtOlV7VTrM7VESp5

iQvXrj9DVAIPKzUojBhEf4B+SbiBMgjiTuwDkqy36gosH9PkjkeqIhF9tkxlgv1QsSniG0PSvvVX3PkM

hB4tweF7ulcjaZDEEnw/nZcXBgQMK3pemswLW/XoFb
n0NGQnUkAx9k6OU+znCrsNblyyrMju0SemRGcGDw1ssBnpp/efCogehnnXp5vW9vSwy1s3hkv9MRo77I

WCOgFFLT2WmCAhCVri2jHE7Zfo5K8+TgBuvQVzTszgaIMHY+JxueaS5bHdR2BQ24XORAmUhk/JJyzhAL

neCmRlrR3uV0GxeN4dna91bfOjPSD9SP3rHxw1I324
sgYLzZcv6RQ/B8yjzmol5NitM0Ek6KKV4D2phHmFizek04SQlldRhVGgCUfeZgDIgh8pTbAFwx0SRa31

gcyThPn8b60uC7An0v7xZzsUEzgyoG/6U8a23TQtpCQWRISfdQ2/hgPm4+ib70o2Eqatr6iD+y5zVBRU

lxSaswl1Y4XS1x+WOC99mp3PiWPNRckA5ucDdLVn6v
7mzXcFfO+VrUhCTC8j2QyHOdu4vuqnm4FIF9MQ49QKnEhCuyOkWlptyd6FW8Y6CN1+YHOgJnGZB3DktK

3YjEyXKy7FWVxW3dxp/32Zzs7njTT2fduTAhUuTF3JFNASk30XtnxyNF3b64yvB1azTmr0s/0pV/Erjl

SwvbWefOSs5FbtM9lXGsLIylOkdAj+WhMleYH2lzc+
wMM8kSVcl5rUHXI4Au096QbDB8Y51rlGH95R8/FecAXnYgSdrvL55yGnGFXYEADgJl9iWbuWOkv1W459

3yTWIna8VmHKYwC3zzk54VczODiEiSfuxxnKLIvPrWVyTcgoTRJ9TGaOjQN68Mvwp4bEut5lst1rtdUi

iHxIqfmR/a1UKyjCegca/SQ1TS+rQmfuLhLPgmKBh0
2iN+AcHv4onStRoJ9b4bJBmZgWcPEPrp3tCmL6WE7HDhEeFwUxAbjs3qtkqSh0dba4qB9aBPFu6W+TAJ

J9qdd7Is90pvK6OIxo/gGCNcloKS4rooGHKfAeg3UmX8eh25rQzMOxR9lYgQbsk7yBCs9nTSGOKM7LAG

k5PwT/htz8+nEG5ie+5qdYqv6KLXD30lfWHms1RtY5
dSrnuUCsTeenZKqjnDRURJkjBwZbNALSTrf1rOdlzyJtH7oX/ZVxRBgtIXsZ7c1/QXO+Msllx/YwM/X2

xFl/yn7DgcM5f9pC5mCGFhXbolG2lf6MEYUIOWSHjPfweWOVjmarJTFWbYiGvVf+aPagv2+NrnN0nXGH

i2pTEQv4cD+8RipcC2Hbk14VvpxvSuXBod7dY5B/+l
tVi3uBVFkv0gu1rERtO639OeAD55wxcJ3pJbvr27mOSA7QX23WIyVAES6rb+x82x9XvoEhHzTufLyTFM

TJNOuVHRLAarktCzmuXnSYF/Yj6ch7uwz5ZY/B0eFOtWir/uzewvIXpd886k82kDyKPPxW86mvMuKN5h

dRNUZs+EXxakGVV/BGY/E7Sv44hxo4hvYz7XWMFMnh
TjRNG14H93S4YAT4GbqiI5oJW/a0XzhZwh8b3b5XXUKIC5O7ekUmUWP0Ea54lG7aZ+OQQftWQewd2avS

/xzjKw5WhwfzC7aIQAyiJBzacygp0NQfb7Gm6GGr3h9bgcacFqbMIX3EU8w5JlxLRajjb8o948U2kyRT

uUqvaK1P0RmGcSBN27Gmy+ZI68wsWeesgcTn8MD3zI
rkpHjZZgtIK4VGp0GPFRBRZMBAJpyA0H0iASOJzxzQO98eD1Obv3FMhCz3WQMa9TnOKXgps525f8LLFL

910BjataFSbuUcdZTurO8MHZJ0OqTBoKAYJmUHLRMKSeUNI6GQTPGGBQAoi2JecTL74E0UZpmwdtXZ6Y

sX3qODt6neRi3quSeufc9+4mWGgorfBSh0zaCyvWV6
h4MVgBG4T9W3/tRrk0Sqllaz7MsCiqsM059ZrKWCrTGaY2OHb0t2sggeIGq2xFqdEzWz5/bCuoL0Van5

qXTo+BcZ1LaU+duTcyGY+UQbX4h3pQXjxr3rm2KBFVo1N5BpBLI9GtSYE5UI6fsYSfPx+VXXokZgeL6T

vjhc4mB3+qz2HDQ4ZJyk6doW/BDm60BEb3pryD+GVy
0wnKPCAgNjHDcvwUuZkWAqbq8BRjRVhvD+aa572dVfx6U21eyyrSLCadB8V6J5LgUGveq3MM+Ssa65/n

Ysxy5s9KHAb1kfwvlq6ybQWaMsY4LwUiHTXGrDnD447NxlhuRxaTvFGJAtxMfPJnslgmxnqN9K/lL+5n

NFbBChaTFEh2FIZHGvRjWa7TLyaRucIHCLWD/CcYqS
CFkfYA3ji2UN/aAgND6Oheodx1+gDY7DCxZ6mN8bWTUE3O7j9p/w9QlWB81ywzLLsuzC+Emhzzc/WlKR

AMoGXS/sxwaxsNdmVNRadu+2L5tged+XLbnqBcR7NqGySwAoHabysnfB4jyx06mP2kMedfy73VknrAar

yqy5DhVPleBbBQ170qNubthGLV9hIopPSUzwgxxdQx
/lxnSOrOdI83rS+LJaV/xgyJ+WKYtIzjxOJu0kmzgw8IjostFImmSEKdUZMk+Cp8bIMa+UHyrX0kXUrn

1YTC7/OUZ2rEmTjEPUn8VEX2qxIOPl7wCBCJA4IiILTctXg6bc5r8H0oTsenjvjD+i4eJnl8jmkERI4S

5ehV2zpaM/90o/OBBjF0xwcfGKis4bimsUtEz5UgBW
s1t6Szx3Ikln2hz/CB6KDuz6s4JijNgFDLVvU2m0HjveEscv50UyrAo/CGAL1XYQj8pcyiRP95NETxDc

4u+nKdOa3vofpXO6wjfZdxW7is+CXl/PUpzic0/q4/HJJaXaXHYGnXHvTOwDbLIzLXlG3prfz+IIB3HH

QC2m8Knahm4GO643Q6baX2q8RSc4kmZwp/+BE9J7tg
z9vE/B87Dndrpl4PAXA7pvZ7+ljSzCrVzlNma+grHq99mZDXwKNDRZFGsx45HjbpacNJRVI7FuEHi8nR

QfFOx/nyXmzi7PnAfuoGzcz8l18OtqM4ZCiK8Mqy/iwVo6cvrMRfNgksnlsPAu+kQOOkReDmXBwrKj1U

/cHRUgEWy0NfZ6AbFGBktKC5u0A5hjwC3oj0J92IHQ
XhNZOQFsE+YMmoSA4JvOQzv4xWd/QSAugy1Tdt2p4euPz0d0Cnip1UeexwbjrCGfS/qEynI27fifkweU

w7IVzxbls5m5aXL6u1XZgOfh+r8nuQEQRwVMNBZfsqOXxJDsTpJz74SSVWmTcGLeiqSZHql5dy5tY0gq

NupDrvkpAdN80ebHTdFj3YP9StthPgtat51uNnl6gD
FbIXgo0PFJluEvlSeONLoPZBp1peYZwTRK9tu8Nf3XXbevRgk6sg6/hNjgEcdNunKx9HF5rUpikHacWY

idqGHVkumkwsD/ILbo09g4kvgOwuPIjKN8ap188qgaondST+kWv2TcFhxi9hX7jzn2mmvOK2OmlVa9JI

bk6mUdmwxPMsg0Kg51T6m8lvcZyYNEUQ1C1oOdg2TB
SJ6CGojVtPGjjD06SkldLLoBPuPtdBIFqxswEuuAH0mVL0bZQ1bF0gMTGBrFF6FGvhJgW83FISotB5G0

uv3nkUjekyZheyx2EHzp0voi7pXx7YulgKGt+EicBqc/aT4ugUPimKDqBATx3BqlRq4VxRWZ6FJTlPJx

azFWKNxT2ogAjp0XY3EzrM4h9vacngGMYfJUPKtK5R
FIfSNjMOTWcmxSz3oYojJwcGjPrBx6Ff9WBKjDqhHtg9f8duOn/TCDsYUujKudjl66GpA2NUT7MQY6wn

wxvvjS16k4X2Orz534LCorTJXtHHmPShnvt+/6R1ouz2xYhL+qGZ2ZtbIJd6EU0XaAf/e+DUUAHpN7fw

zx4OHhT6E67Ri8TZP+RG64n91D7RGXnGzK2spM8Y9X
RiqeHTbHobYlq8EZOFNU1t5HvG5aQYb6qqtj2+BnAODgO4yo8qojUaiNgWce8+um9bYRrldUiuq0JaQZ

7K0AvGWZBBGV7H/5eCTIwdQcw9k/Iw+tV71hQS3uQzUnF76Z4FqXIFoAsJ+CZ+fNc01EyrCinKT0foiE

tALtHPFvJivVMdr7jDsYUkSRoqeqb/dGKcxubYKHbx
l90a1rQ0hAdkoFJyXMyY1mKqOWE9ae+uI1L6cBKps5uFo+rO6bLhzpangKMYOVYlqEex9dd+GqPIGIFq

cWqJRxhmyO/z1sSlprITZrDj0pqvKSkT7IG4Sx8CHJmAorCpVxN6dxwtBj4UnOpttSz55+3yAusJ3GuY

vZ5cTyizKOds3sAlLKQxMrRGZn5b78k6bEeHMjroBC
/QxOulkBy7Bm9QpLTOmd2Uo1btObnVXojwOya/vy1eF57Brd0bmWZEUwUutNkmES4XRkBrHVegqRztGJ

TcZNh8BXXkPDdyK2JRokFaLbRxtxjtJ/BN/CdIr33kOYmFTBab4CC/p7QFwsS/GB4lsPJprvbpjWqiBn

3A34pxZ1I8+8SGPQ8eRNEVSnIVAWyxvK89apdAaghA
F2aObS1Su5z05Pma6j5xVV0dw7ms9YykwWYyAS5DBjoH6o7kzgxYtggppQd3u2ZDf/MF96M+j/N60y1H

x9uXU6f700Hq+EewKMfft8jyLsvv0RVIBZKc4RT4Nk5DP6Q6wHKKJhnFz2nkkzKXsTQyCa9psBhtnF2i

yXKH6/+3CdLMoqVn5q1drkavAinNNHLmLeS6JAZ5B/
12Ftu5/aTkjwmwtJYUUetiaILOhJaOumJJsM5iwAxzEEO9ZEyz1k2h+s8AD1L3pfU87jboMWFdma7FZb

XrF+2+SVzo18tsq6DVOfiqNW3oQxWTYO+dI71wfnTc+scx91aXfb3iANv5LB40BG3hZO5mFOMLbXP5Sv

OErVf6j+9dRimaqow7gxkxi+qqFJ/2oHM02KnRllb1
TBg8RF6BZP7ie70qCqFLx6hHJgRtUOMSN4JbO/tLEB/g3SVRxByDDtE5fRT4JNLGSiuhblEdEbV7v1s9

JY6JReTLPjheCemze3j9uJHnLnKcj9iXMjg5yq2vdjqr/5qtDWpTojHSYivSaVRDDXqB0HHh8JefOcXJ

Va+GE5lI8jQRPOXaobDTveK9GxaHUn9Yek7/wv02Gl
MwimmJcikxKBYG1WkfLt0v7U13wSYu0zAT6ft+z0+LSkVv7/P/4W0Cb4jxuH8WWv4HuKIwYYd/AoqcPc

wBGTQssy9gjhctumx7gXS14hdYX1dh0a4RZD4jVt+8pADbFa9n9TQJcG7svmVp/Qxw4HQS36EpB8Fjvh

oIZ0eOS36MV2Ofb1SIr+FZ/1iBvDJ2FCg94NyCzKK3
fLTn5IyMO/oitsO90ygyky6UdXWzBL40DMI33cYAmScWBptpJoYpUkHt5iCwhnWfvlPqt2rm9720DLSo

Yo2pm2mppeL6KeE53aIMo0E+ntizTRl3V3+2prZXxp4oWf1ae4XyNzcTsfSfSsYgJyyijYqs3r4K7ssa

r3OYnC8lykQCqIr6XhelpLcqOrDV7dmYVXYLRYCDqy
OJaJjQU/82yYcZ7BMvl8/2V3BRA15XPRx3cQzDFVtEkCplRi7kd6ZpKf/x7tbUY781acdnaLOtuSuuh/

81AajbppHCYbADAIcPpOLZy6jv08dvayUJtp7Xb/hJDkVYdZeg+9cCo6LtHFqrHYBaaIbjcNDi/uFhcf

m/MyT+9tTvWMQYsNkjtMt94c960xBrSJrnfUHVfiNf
2vovjc5qKxkzf45/0EhZvEl3MtAu/C73GzScsn3mbeiXnsxk7zIKFfscvNLGAhQ78v5hhzh91UkjglPY

taMpoQFWizSr4vmNmOaQiPQt5VOBV7OoIwxWvwqIVGkfrnqTiRy8FJLz13OrNN6ZFbaDcUOu7ItP5IDk

NdT4imqUdoDl1oIICnB3V8agHXp4+RakcdRRkTt7Ch
mPHwiAp6slR2UD8IhPr7eTqNJP5F907aSQFCat8HERzWThqXHB0evu9WqEK/tw8iYKzO+QVyApsjmlxM

RQZjqyybqwzvCEx0Pq9iX7LRYXe5M7CJgmvY0YqKZ52EL+MRjdBhJN029VxBAMhl0AwyrWaXk66CF5Cg

6d6td5DAB9MVlD9+h3bEHkBylqQiaxzWM6X7OkPr12
zoaUdv62Qr+3hyEmIiu570DM/YAfsIua8ssUyeq6AMpXdIreXnNpopq+IUb40vrI7XE2wn8Nju9BLhiJ

xwYalk3WMujj6eWft9jScAi8f9sBEd08BANX/AMQ7xmqMofh9PMwugXEGoEPIRKZFnthK+znFPxmqFhf

NxKX80Z+ybs/uED2ETB+ekUKBJviONBUPi1ZtNCybI
pEQmtlt0RIAxOMsdxhOuk3w/51giUwDx0tnUNQo/557ZQkV374Y5Xjcf+TVSb3/kAj4Qx7qHZ/DO38RQ

WPsVz2WQSsmbF+9vdxujrfoe4Ukbta1Nsuwnc4kr4e/M2M3OY9i21utBHDCQsOt8jhO5uOruEQHGWktJ

3vjmsfmI+vCM4zhzuINLKP2x2Nj38LS6hHAAbrUrId
gtlM840EKvUgtn4viqtVb4U9tatobmC20ttBnKWscP+AQ9wytaqQJ2SXwbxtB8BNzFn473AL0g4B42oC

ptOfH0PEaI4jL8lwHIzqSlDwmkTEh7J33HFEF7aaPsZQbvQLjO3W4yWsQ52+yzwKwnt58l7qL9X3uEnt

Lnh3M9mGQXnBig/JrUhlCwGprjZEm99mNazfuJ0F/x
syc7YhJtM8p7xhkMfmg/o8lydy/ggBYEke29x0oe+oEaiN/zflhCKeWmyEhCP8uK+grgfdqk7sZtMwYZ

Am+ARLgJMtS9EL/pEuH6zb2TzJExMeMe2U64tLVYhIMCAGEWswProhgcPL5SMSEahRXyEN9hY+vuEea3

wiCTfCM6z/vaOUhEOPijd8ck1oRvfvttKQmgEZ7GVx
yN+3uqUqginj1ZKKRMMQRLf5o+OUb3tds7zHwzXMDudrU1i19bFigztpheVGxbI33faZjJ3Uv4IhxOI7

2NnRED+GU5zkAVaVSDIqIpgu9JbxyUjuo2mv6/jDl1Kpz1YtzlAW/dGucj1K0ps7eyUu2l/AuUldLXCo

kzbagZo2WAuKyxCDfUyF3m1r5yBZviVffADCEBDjqg
MCyRuZ20f5fQL5b0IR9dNza2i9sWKEhhRBktjg6V1kFKbbrfE9ueH4rt6fSXudvq7Hd6lU4zp8rUp/tz

NvTCH0Bll0KeJ2a6gEuXoLkXavzBCirij+gTMvHMPMbGk+HHb3eqykJgARHY0Z3ruAMwFfIO7jMaHdNT

f7TuXIGUeHhd5fFO4RWTHeiQMTFp9taaLdVG4+A9UC
TU5cwvUbDV7UhLBCXhf/mbH8FyhothZSYWk+IxBW8uO8vr8a67e2CHN7lMq87LIiXZ/l+KHQx9NjwhkE

l5/Nr3qgQti6UWXsmoG1f8CIF5CzpiaMm7UsxOkN4a5qpiIudAtM49eU1IXAEQ2AnIvUYssSfWoiLhBs

aYvI+ERJxaZ/3f8lQG9Gx8P8v4MQzyMUibsdRgV63W
k8N4hVN4ChzEbIliGP7GGtah/PFi/NAWLt7HtVkzlU2Xy7Cj23VxexTL6jJmkfkYpRywvNx9M6zH7oei

ihItNgrMqQKWIqPSumhYjq7alpXLoZYyhm1ZIx+GFYMkPRWheX4pEik6T8pfcRMIrlDDbZzOXswM8Vlj

ybFVsKVnPc9T6RGM63+fmSzodc7e52+3anlsMO708M
sDZGl3hfs3al0+S173Tih204Yncm4udV8ww/rRv//+pLmTaCd36/fgeWDVG9YJtx7kK7yBIUnOUHdgqx

J0ZQHHllr1fTYHQtiKfR7fXqgE04XyUV2VG2MH4+8tFA4V86vHKt2BGYVBk/gJ/MH7OnLzo3WDfoDxFL

QMOBhyGvxuMV2eto3AxQX6SBM2fI6fwmpRlx7Fwo13
LPav8tKKasEG+TOcKVIe3ix0Ap0SstndFtLSNd+d5v/YlIyrLvdjWzDG/Dyzo2u8sEhKQ4VFfVvPvtRd

80o5xG8Ev2GkG3bap9ci/SomPhV+Po+1Z3lGcOl1ylhyZ0YQit9ORkitYLS8vX4AB62JBvFhPC6M7Cbm

Soex94BzWRP5lcf/2XBWRj+LkZolihIyyPgESMfSHD
LPNNSMSwjPCihzmzsRO1+uxXoKsrITfb/GMRV1E5ra0xsvAoZQTRQb7gRqMMJAQXMfsZMm3PUpkjFVUh

yM6ixDOoXBiHEWsZ6ygKsk7ya4LKsRL0uSQ4gfrOKZxrpK3Gl3PZckal4FmOBJOc8Ybe/q62rAEQCPjf

LrwqqaHyYjTTG3nym+tvdvuTdPeSZ3531UVj2B7w7G
+9pudIFQMQrbC76MtesuzWvpZvLZo3oDBTpTjUuCv9Q11o9zszQmv2xOvLg+XekQR3/8qX+mvTpMwYm2

kho2g7Ic3FkMDMdsAHY52CVtwSHFUpvFRLixiOD+1vr5iyq6uvScfMi5Yb+rsWp9/XKOiiWiIuGE3+DG

+ZuE5gmjttXph0YfPL92EjdWHZtgAskXYT7xjkHfCQ
qYwIXoX4N5E7fvAPod0bVI3F3Z7NRiqPW+BfWqIfUOtyIMvkZY1LuEkicFqEz8sb9nhuGs+/QwphWTw8

d2OgSVqj5pWXHUfmG7OzCLrSs5rj4ffjD+SyIhdkwRvF5lpVUnjWMGZNxNoO/JymvzB/sMbDjEXCAfH6

zpsfy4ITuaq//C7QVlX68yh1TCy1QbC6Haol6Zem55
49WSQAii1MGibIiouVQ6IO+7Uq4Re9Sdu5FHHRCNWBfMCS9mMEjP4C5wski18uwobiV3Mqze7VU8oEHD

5oovce1kd/L4r/n32nH8XSGVuQZVPZPZcx8nR4wIRa0VyzKJpavzFKu/r74wSPGTsX8BZw74cS9i4Rkd

3Po78Up2+nBHt7t0aIbgSmGlmEwThGhBFEFCSyeIIe
IpijwJkDvjXVNtU+2JjBaDffVB6r38O+YF29mri+r9LwR5yTGu1CZJkbOA8t/lrt9sqJo/3nzOvElhs2

muxzv44EbIySyyio8wqJHQNE6xqlCavZoN3PlTIkuqV05Lc704+66KIB5+UCpgsJwQ2sZlMuC8ATbB3b

ayF6QEth/TIJY0pp/UMZdczmtDp0YPnaU2NBUSwnfj
hPOKjxLBOWPw5mauw1buybgr5B47fbjmdF8Vg89jIRVYt0LW4Ou9sowCOn70bArBSag4wktf6YDBaGid

I56LbYfzLZHwCD9CmfqYeD27kR7pxaa4dD5MT81UfaiprECUpU+oW9nAR0ULPUWxhMPP6tX9fyzDSObS

G4g63kK65FnF+fseMe54aWEb0GETCsvDRyvlZl7TZT
9p/knnK95vs0enMmqm+M+btiT9hVIOdMl2FwOqhljLDsgvOp0KwB0DK8L5emjn7jb5423QBrdNeQG4L3

rtEnE14Pe1uMlYqZDOiPjogL97CNwn3gtTwKUFOSAMhOw8A25pRLNyeAHlNFsgUrHi4WaK0BztW4NZgH

FVeC5VQc5d+HwIoqxmic89ZNj/Er4Uqy3vf84Mt185
hoCAma6LN6hcGxXtx1JUUABjaOIWKwb+T/zcTN/+L/OTANs/soayKErHFlYJ6nn6fxYcz/MUYRsCCUaC

936/xtmTJ3gkc0fUUpCBqFuckl8vQteaKtETxo8TWf0TrBI+X1Y2UWh8QdvKFvfP2rOR1g2Fq/YW6NKU

dFBPNU13yjhVEiNGzkUxfwWUcPAWJfVS8G8O4AZZe+
2ZMWYoNZLGLMcbTEE2EHpr77jizLw0GbJRlBRQ1zwmW5A7cPMcZSLjhXi77oHCNadXv2xz9+H/hc4YEK

1QdiHPMaNCTnVWKB5K/tON/hRT0iDDff/Y6Vg2mXXmbxHCsO/paDqt/bjA26jGPc6HdBUTl1hZuHqCTQ

GISBFsNNd3Y8s4roWWWrTI4R0t3S1b7U4GeZmLe9bx
HhkczivMoEcylOcG9mLd0JzbkXuAn4e/ADlWSKys9dp1kyRsWZKvvHB5rFQlDfROmk93t9F5dbFIqUAD

iiPqysrM0Y0xVcGGIxCkoOK8sjeQsTt7D9B1/U0j7eOp4aBLiHg6HK9aDNICG4wV0Lf5r8JGgmt/KfR4

BemUBWo3wRfcOPnaJARwvaBiB5ndwly85Yivr942nf
h+dyKbQnORpqiiYGoE3zY2X7B1/OfivHsfsOnM3T6dPYGE5fpSksl1/6AUasLf3ylT+tvfM4Nv3Kg3j0

fmCMu8lhZ5QM/Y4tVdaFvz27G06zWsWC/xx5+9BvpYwzKxNcRlJMoeLOUMBn7QuOucm4y6gq1x9lekL4

N9+zVidBF9ansvNDKVrBZ7g90goEI/XP9i0q3msWCo
zqh4kl9SbGJvo5+FuGcm2RUQu6bTjVKsYLBSJFfR2sgaMI+oWl1fPnZgHtGbTBqrEis88uO+vUCAWT5q

16yyn3GF110H2mQU8ezR+nIhEedtqxuOfqI3Mal93E26XhI4GzDW0aS3ZyDxqnS4E/t6Zlf58tH1NQqk

fJ7bjBH/ULuphRFc6SVdI+vT5PHRXD1zhOSXhc1fVd
HKI7fXg2j8wZbnGjZfU44XeWbPVIFUc3Ib2OkbSP6KUXOQ2l8dHHF9XVDDQSecRtqGtcU8Nth29D/Zy0

EPfvCbv0ae4fuPjjYHOJ4MgUvQW8iYv1xSkRHHghv4NYOd0N2KjB6pVC3HBXSF3b4qyiEvmebSyMrMZI

q16WezA8xQ3amA6L7eOn9n3KVNi393TPjhVxU+rUWh
Gf2lgF4zeMbXPithcoBwGiNIYcdaLwin0GYr+oj8ro8qd8UTNBqZPQKiGt/sDd8Wfmkgxkk+z9sDt40D

M6j0YV1KQ8tD/B1E9drUgD7V5r6Mxm2xzxK+tvJylwY/8Y6yAmzX1hVFfbe6zmEuZ66AuROgpbgPr8rK

lpNrUM4Cwgu1tYzxchXSE0f5brgmom6ED7H/ki/HVk
b3vTFtZsjbAOjelljGiCGJ+H4DWPA7PlwdOGy7uosmaYyM7NKZZofb+d9HW0qifhHjy4cu1FVz+YAaG9

K52YXATxeTeW25AbI5FAvYR73WTs9O/f4DDjmPhMKrR/FPInNp0JTSRgO7t3s1fgBtQjbyvN5aCmHz9I

9ckMnv/XKM0D2mJHo0/MzziPyvtZraUTT6jmO5QqNm
d5oxp7ax3vV4lfxWt01Mt7BMgHQR1HBar1hk+36Dvybil8d42NMgczlcfRf3z8AMDDJtr2VeyviuvrGV

tiBDCRC5CBa4D/1hETQ8jKDH2zGmFRIs+lku9YPjmhkihdAsRZMlzgZRjoKSdfwjpqoV6+YeP23Z+BwX

4N1TaXDL6fyNqc6g8nlfvTfQrOasF7Le2wF3B3rxM6
5qGyVBA/AXDeAOqVcwYdEDSXw6RS1tyKw+n+RQFKqQZGHzVogMVVx74s81qaVjpcMLSRFXy8+MmSEEZh

gO2zPUnlEUub3i+nX0bqhVBoMfg3A7pg66HTaj7vFu8zAivWUvnvWsX9B/w347RQ4yUez/Akp+Z592r5

2bd8InJfZL/xhIq9MAJ3pfpg7lLr8sErLi0JB49vUX
GGQUFR6CHOLX/EQMamPVw//MTZOEDLNa8EMDp8mPCcAWlciyectVeRXhkDplnKl8uX2wOBFxat5ijSa2

MQ1KAaiOYppXkCbLydylk0V72holk4szzuIZm6kxJm67pg/OYW+a/NHE4nY9Kps9RYIM3O20QFuoMPEi

G3tkDU0RSmG89uoxwgBcxgNx8PVnzAPqTFKqGQCzVx
8E6YFCxtwovPtZTI4NByxYVb0StArz4v4TMxT9qwac+fQei5xRpPfD6tYxIVm1u7OazGXzSoOJJOyQLs

j8/ncQI+8Kwf7wI+esAJnz5Q15tw4fMVqfSHeRccER4+u+IjI3uZF1ZLbqBKOWESAEnejsXWM0YyDQVZ

pTI+k6JWAvUocq/I9mq9UUQkiRZUbyROO80rwSU802
KLJ+tEethva+873cJ/pc7/Ysx1drGSjMiAqYg/nJDKv7A/Lv74RKdfmahK2/ZB/9Z0LBYWaUHrLRrMII

9BSUYMM3flx6R+/Wa6iFJhd8hv+AGASyxYjJlcK2ymcDGBTV7wT3IyF151VezORENXP+j2Mi9fTh+BzH

0s06ZgspoqPyfpAdl0S/TupdPWPcmtBEGUs/mez15r
Mb0bQgr9v86OXFLH/emXqrLLGY7HralWa8miyOzeT95Wl4zhZBdfM4LXbOsg8u6K7CpAdyGWuxHcpZXE

tvVIindqPHibsReI+pOzXZRixteNZ+tZerHPjHHWcuLjkpV2OcQIFt8XuS0zvpCJtAlEBX3bFpXqTXDW

n59G29XIMvDzHbGRbpw/5afUiagaXElYu4WWjFdTIS
g4phAjfslo2hmamctlq7gigEzBXmgZ92n+O7rLfhUkyvgRq5ybcEHDi1z5kgb/yEGScNywC+9mbTMMW4

NQ1dn3GQ3DpRZwmYQkhPMhPp/DMKxse5RX8Kp62HkH99t4R2fwxCvmacMvw/lqNTgzwQCcmXiosM+dfd

Z4N76xBhA4m+CgRGY4p7NYf4le5V1PveT3ou3hx/H5
TO8Kp4e+GkiR97hSNSHDB+X12mSUCwjHehv0EM4SKbPIxgeJf886WRDKp/hH2YDK2e6+AcK6nGgUmGfP

f34NwVqd5EqamJVaiX4mnmVKjkNYQUh+1R/LqoLUpH81Kzil+/fnUWbQXeXMNeXwBx4NgSEDPlNDR92p

HAcm7VLv2eJqRPH0LhHlJVXaP7n2clugAZ0witU7L2
R384RCCR79c2Kh4XL5ufCeBkchtI13Jdsc17g9KbsXFet0AcJFV+KkcPTydxeRMKIOL9+3x0wLf6W6P/

2REiEnTkKWGkQBup0tUOFlKgtT2oqfR5TbQ1Yd9qQ3NL1WsWRNXJkCTrpJH58q+uk2d+GhxMnI2HNSgp

N/BNJQqqXk32BWxHHI8pZPbdrlOJeZUGLsu9KLsE45
2k2PXPVGCWrNFAftiUYsqDKlr2QS8BVkzjy4RNqQV/bmAQqbJ/toRS5G9NVbewwgFczX9DWaOWvDMuOP

caFLQaQhfUSJPEeIjd4SJKfI1x6t3VRv08rPIKrSuoo7RL3CgRq4zzWE5Duwcmu1q5qx8OlAMhWHwAhk

nJcwBhoXqB59FUqqkY3JLb6qN4387975YOA8uxTqkO
GyCpQfOZvS5MfXqa77xskExHspHHFTtqJyenoaFnmv5tri48QL6e/YIhEhLAK+NGz35kuCOIKeFPcWdn

4SCevfmbCWnGSarUdv9CPaY+b+T27a/V6a6SpdfhnyJD/TaxGYq/E7em05hHf771c73AiefBlYQYpOrV

IKhG7bIoHtkhlVp1LDikngnERsBCTqmuXhjwy4U4TC
5h/b5DyBRb0MyGSlsUAcecMB2m8v3MfTxTUdZWzi8Nh1mWX/oXHDqaaAfKTLxSvnB+V2S/u4/3m6PcLl

2Ny0/Ism7k4p47aL2pb6m6v3VGhoN8s8PJpaTBOnnydCbZSc66hv3lu2rXxKnT/qvI0Do5Q54Kidlgd0

KUeaMl6I1+zgyrUFm0diP929s4isiT24yMhI8AhXT/
48O7omJEMcI9FFNwiBMr+fxXOPNOiQAdoERQwv7Zm0d7EpDVTINaIe+j5XwEEu6rdWgag2ZcBF9tUR12

mSFs7LSL4kwWLz3Q85K+u5hzw2P5LuBuL35pXt/0oK3h5oSkcs0A361FuwcfPpvEgGjRMts/KBbpAInc

QDF3cfkZX6kLUWdOrzMYijXEY/LmdKKQXeFtscnoZn
XpYuYu6Ddh46FxlOk5629DmkiG2giHS9/1AVkUMnVNzsl2IGQuvNdyJkK6jfMVxXno7Nd4r49J5jD5e8

tHyunjKM2eBkxIWkj1jHYU8gxjJTaAbIyoXfWBrmAsBX9avI3mDm1bCXGt7HdCFBUWv/qhqJ8Sh1oS7N

J7AqgXbRn+7Eu+GkvQZk1lf4MXRLslGBaYsABenHg4
QrQdaiJU2mG43S2moXb67adGSeNfnerf83rBz7qcJAjbYF4yQgekECE9GNnzFJfZQc6zKNYmtdFtVHGv

QQZio9WDUmjwayX0fZ/wFGQyBt4oogJ/PNYWX4NdsHv+liQT7vfQO70fb4YLNsTaEjy3kOu4YrTSCVdS

W+tP0R9cNThwq5+YJj+D0VY2lF6eaOBm8LdaYaYe76
nO8Y1dhspLbznPbZzzkT43RcFGaW6+hkL23qB9w5WO46Vv+InP/Di5zfIJbBiS1F/1vAHReqsKCHuNxo

gnVpkLnfdv5ozSgla5WF8uEaza/Lq1+8ZFGS6odXUV71Qdx6+20JI6yCdtWmcf8rjrJIxK8mmTB+JV7D

TXNl3ULckFleX+yp44VsZ1ABz4BgD67WJSF+/k/R+U
KkgLIGvAL4y1bzlVrcIJPmaoAl0U+hJkbcH8hrsZpb9AUoC/2csD18zMiv3km0j8f6jtnlXebqNW61NU

XaNl00BIz4H50wgDoEx9L2/G1NSyL8NcBPnPiv2W7x0t1tsicnc+6rNeeOmMnQXv3jvpIoWyaKgdXPtn

h51TrCpgGAWCN1Ne5JeyriBAWYRxCpCm5TLW0/9z3P
PH/KWNQGI85M78BaB1dulXyoi/1Z++a4Jodvu0UvDGGHKcKngpi+IDARmtbyTdhx4iZp2KTP3y/r98m4

iuhDrF7qHDXWSaRtIkzKc6Pd+KKO0tPZ7NSO+UjEPcRnUxD2FTXL8Qb+qcDVME43xJS4om0Uvm6widCN

wNz4OwjDO3PAvm1PhXtRQjtAR/Hq+oxHcuX5KeSG+u
RWQPqa4QwpaeARnnLjJ4588z8D/xGbFpnEh41CZ7+xp8CoS10F1jF2pSgSjMq0gL0k4FQzhHQOeSA0hF

WJm+HVM9JsP7cpq/WMDE5u1+ds/an1Xi04NJYyfYdsP/FXNDy7Odx2ohvqWiSPkSpfWK5YEqwE2KxZpk

PvnXA4E4YUCba/ganEyR8eNVXJBIf0DOvlUVVUK/aE
RB1AD43nQI45vob5h9D3YaN7sivvga4ct/gD2ENhoPAupnW+2eXgbkZMK6nM0OmKBIuA+b7OGB60NnKq

o6k+33eH45izTQj7pkVkTicDFRafUsUyM1GQazVFabqsk4jte6KfIzyfo1ygGdRfyJQSblH7J/cGqsKs

lW07HEXp91DF3CjgybfOicxI2NZqitdll+mXbhk+vt
xX+/zvvzXT6pcZxiLTrRvR1tz8rag+C6qencw5hED7hzkN+vxefG590e0BGh2ToQ9+e4WQaFQRJYG1H7

/CMcYinjaE/39+Hl5vj71EKe+voqoPwcC3z4CsPtw5GTuEwsRAdGE0dFyFjvxxi0E1WLUT4Tgk0l/56i

RiRfqNQKymvA67oPtDU9Z2jH+VhD+epHrB64QmYtJu
XdeIr2tsGAOnxMltNHbLu7D3x9X0q4/1wJ9Np33EG7e1fUCYdDHa/uVGHBA4hom/mDEyEwYyPjMj6a00

8nm6UYUcwtn7oW4Un2JM1WHt5i3o1yQcskpyg/qGLwy9Y5/rVbbDAGH+M1Z/fIfvABj07+AigtINoDqz

+ope8Bs+YK0dM9btUXRO1IeX/Q6/tsVCYd5H8Pbk69
g3L01Yif+FGcDuN3EdN5m8e4MKOgSTDzEAkD/seSkO2V2JN3eKKBlRj20OlLQj5jOOMmBWeCXOaCv1NJ

AA5KheuJdUG+Ih30TOROlFkoobQF/WmL6FpKKJdmqs1w0wLdiaCJ3tGP7v2rRbnjtfAe7M3W3BEl26tx

Wuf8/+CbiaTs1bUf6mKjx+6ZR2ZXdhGM9T0CaASBM1
QmvmhLRLusWkRnG0zhRPKKdW1t98+dL3wxL+v0ExWgK59FEvTHr4xsCsYJW0E8ssR00hqwAK216WIqyU

qAxm6chp8NHLGNpr0OM5OzG3RtyhNUTHDKZiTC0bxs+faoiAcRosC7iNTD0DyAY7M+tiH+FI9vhV8+2d

v7GmIPaqb58C7V2Z3+r8PT4xiMwlNRUi0BqZVtKcQ/
TiUcFH130Awa3faDEmD5jDMvXMF33AHR5xIYsiz6mErTrncHaT36eikfQgrVn7OHMPLoiuHa0SAj6e5+

AGSpcoW71C+7PFKDQ6BBnwBuUXeG9Ik2ALDO/f7ftYoDk24GTfjoeUoBgevHCV4It4/SHedMhuPkfwbh

sgkLmylpa8fD7o5zbz0rXrsP8Q35mxAQmdj5u64C0/
vw5rG5UI2mlx8+rxXIulWeKFtGNV9510NbOGKqX0XqWzkPGuWXDLeD++t3Xht4Hz2ax/if2Y8hMd3QHC

aA3KkZqsl8eCAj5SYxySqLXcTzgZTXr+JxcfuIyccDf8oRI28pvPTX6nzyfVRXCwuuc38J5SCp30WPMw

ucjMhsZ19sG7EiyiC9M9btNU5v5c0W1zXxypj6e1xa
XDswPIjIKbiMuZRsP5V8vnodjYRBhvEuqB7KIhctC9cPfcuPcXLM9/vau3r5iFmKZfdsEGOqVSStm43J

er5uKuBJokXkCeLzlKRrOeoUmHxXjjx1Du/4b4MNdMW0cI/gjnWa2mGlu0mypy+EzNLD+Yo8CzcG3mOQ

XTvhHzxhiCda2Av/VMplx2UmdG3FU2zXt2w2/WiQSF
v/2xY4mFEXqbUv2KqwijOBhT2CXL2t2yT3KsyMLUOX8erhKxySUJKeObzK52vRb/7TNUY4M0MmRi3ElW

zfxJxGClAsVdljfYH7/z8SH8rccnl/sVNr5IPJdXiKGde1o8bsRgtuEYlEX/PG7pEuTxJpSmpuNPOCPi

+aKxQvCCqWqJmqyzru23AZlkXkZAEQHD2M7BtZ1MGo
dj0UNW2TtPnEeKVOxBgbvzWz+gXBBSOz9Iorfl/z4768f8I2o6fd0KtpxwIWoAQ+pdC3MvxFEFVYfwea

b4YbbIinFuWlhixVtOROoCEDtZ1ZTord9j46aC9toFZ2YwN4U4yieyY90AW6x/IXqMwb50s0LVqpJTBQ

1oHyQ6W+e5/yf+ki3gJKG13gBVGQXs2MMVvvFGIcPY
+4wWMx3d6KBPlhUTKE+GxmbaSIvdFiBB27qd5md7ilZJ0yX9y21JRKiF/5M1VoOitlagYldhSOJkJWq6

/s6ysAYRjBpJfCRbnflaPyGgQJrHHGu3Rz7e+64jgvNJyN7/gk5trHKueKsD1nt8IftsAaPTr0+srieZ

1KqfXfYP3SB//GCpfWp4hRx7vRSyc39+BJ+/GJyu1L
Io1LN5R8azZc2q2ABrBcQEgbwQ8O3GjBEZ0giUflgNH0tAXC+Y0jGFpTH3vORHqNwk5Qyi64v0v6oEhC

h3RZMx9NbpgZVp7mVB54VZv+kJpMySSxCTdzmLAixZYO+qvmHaoYLxxrAKmagmr4pHPcA6j8ZCDmywJS

ttAJuVxDVKG60e4MpEHHWUi/1hBX6k2/Vkquy4dffA
ZD3YY9oaF3O5rlv7GBBRyO5Ue95lWMzhTWCtS1Ue9w/Q31GJfnhj52lyaIEEMChNDzYO3h9n+hfZDmpJ

VvcMz0w4dUhV+F1Xi1MaVZLopwjJjq37g3wYkrn5f7zvIBZs3KXE8B3PCm4cbQOdOw8IWjy6FRZPoM07

qZqTw4lCc/8GtrtsKUu0Q2D8Ptc231jaNYIAM1sVLK
wr531xJR8TmUyrtgypC8vcGhpJGXAyBYJ8owkSs1C7X9cOOPguHdURBxL5AlVg4Ikn56qt6ExwGECRhE

o6SgsfzReTQNPKvu9tmaPTXRbgEiXmg4HrKQ+3hbzo09Uqu1bdraecS2Urn6ib24iLbH/Ljn9cwelgif

+xRq0tpamvDOn0NphpU48O/kiW1hcOqYfS2VmhqHT7
3XKEzGjbQYMIdxUR2hc0ROyOn3YGQY7woljajPaQ8gK0u5DIzmkeWVi9jRgjXcwgeoP3eG/j9oCieIM8

oduN3AySZ2jXCXRefy8kgvH/kqvx+R7dc6H+qzXHkRm2t/oAU6WpgiIYNITsGo29z6niWK/f2cFsycaK

jQ3RWlBBir0SsSdPV/Sdc69ZEZcZJ+RvK05B2Nws5j
WkZAb9edHIgBjSYKq9fOSrVxPv7AJBeKmAyjhXtClYLUOBhdcTWGqFiDlFeeZt87B5I0or6LKjcyLbif

zUAQpQfxVUgRNkKce9QvgV1wM0x3nPqliW2SWADMJuJJ8JGH90T+9b/CtiwQF7EwTM9V30pQ1gP7uTmo

CAd4CEIeGJEhFFsV5rEAtFIITQrPP5S9ACa9Uua31/
Gwlw9O+6HqjnYRFXuQsliRM2v7e/GV0Oh/+GgUw/vcD7f+wiqyJI+njR+zXObMorbUFo8wF7Kqe1X3O5

HhGM00y0nsDD7FAeR1x0et2Zv8WxZWPyUnbkuxzKVmuzosW+FNEJlu8tLTQ7QEQUpzRi1X9StQQZ8N7H

/ZWUt1hZpa6WKOY+qb6zvtBY3bSLjSoKAT8Sm/4Lx1
nHy0JrDcUJCNMPmD0q9cydq3IpXwPfmewGlixmY47leo9Lv8k88rujfm92Xwcxo+VqW04xSO5Rw0EyOy

mKtlyxRB5Sof4+mfLym0b4TCERyYwzAkn99IyoiZjiRHwT4Q1BFPaXXLeTZkeDGvILbdwId/hWNwSwDa

zjK2K32YXK4NApSAI0vuIHE/+QJ7VUQ8TgWSs62FB0
Uue4i116G9g5TJyU7u4LItr4qd5/L/eV6mpvSKhr21g4mC/+D3N6Iy4U4U+RV5K3wD5aUv5/hH1JXJbw

u8NDt0cZIG++o1sixBOopk4ZDvMSk9YSM1zTtdq38z9FmZaYCv/0OqmNDrF22V+yrz3cswk98UuRB5m1

yuJO2t9UKdPV8ftA3ei84L2UcYe2DED1fWmhTRWh0+
Yc2yBgv3/CkuY3xow/dHKjvuJ3N2VDLsNsO7LijCp8G4t8rDeVzUqXHhm+h1VYP0by+o70Ei/A6K+Rs4

ef51Wit1viA9bsnwd98KwOYFdHMCYIRGOj73jTZSgr/nvaNwHDKCRG4yTNPKBStsG9Pd75pj31FGVL8p

N3uS/mxv8rAobnHTAXD9zNMH1EyuJErLVXwgsQqbdP
neDYmZJaVbnDu/l3sWLk57Xo8K0v5Hv1F2X/6GTsISr8UTc8d4CEHNpjGy0SPIg2lRbH8oqLpNcM4jr7

z6tbzF5JIU7yWsGyO75jdiYndX/2ivSQ983D72fWd8a98i+bJ/b9KRSohzso8q9rHs5S055a6TNBOJfh

NjZV5++2MS+3JDCdyos+8lONaBcDouKPuyUY3inRsa
HHzFwW6hX8m5UEPw1nWb9gTEYkLLKZEf9+MmECpWO+G5GCpMbO/9EOrpKSlf+XMLNdupu8/3ZUDGF1nJ

MTLRSUbRfw8tJg3/ZGxhLUek42oJHbvIixyu+vQXpU9z9ZoD1eyoT+XxxNR8dMEiDSh+raenxugZiHAd

aVRExDQa4/zNIRDP3Zest1eeuVSv3IceeHeH3iJUl6
zdxf6sP9QJW6hOMu4rjTdYElS1SmCcDsi8hJ1oY3MRt70iRqUlT0yV1usdKOu0xyGd4f/qk9reAXs39j

GwOIZz6MOv+54+ZTwSzP5Kduob5tMNjXzNfz6dEf9xb2YaCD0yN7S7rukhe11VCkyvLYVb6v2WbO8Y8v

Wr+vKS52N7JS0+xHEHo5DIO1WbfH8r53LBwEbTEncc
2H7Uc19f9pl91z0MxhXZVRNU+t78vK7ZfFZwwCEjeKWwBe1aGMRWCw/VBlf5F7SWsEXIpP2AqggZ3JaI

Abigail+yndUx9uzcWgPgQMao/sXU6Dqpsn1lGZZTyx3MjcZFp7Uo9MHg7+vUD6eRSu9ZEd6E6x8PMg4xIl

mKWEext2wfbqAWYVmguruBia+4d6elp72+A3EjGtes
UhY8LxIq5cJwpBIxqh6gmzYLTCDw44efMc32ruwWh2A56UDdP8Rb1lYse4QJVtzMEz8Gmh5nc7WGdbHE

71y9aV0nibCNhNSSqyAH9JMENmNdu7H9flx29S0Lgc3+9pp1a/dAlgg98wB0gG0mfFpP5G3XnLjPhoG0

SnWynwUZFoErMBEMGnUHQw1JuJEg8RtgH3dYG2obWY
ZuJ/oCSORGWAr5rtwlOp+eXohVwvczvAdnYwMy1tE4mpBrXIWFMbDrfgbakaKlZSEjtHhFv8S5o74Pss

2bImfiX09idHD0VdcDrRMSztTkg+thzWWy0sRiVIiLnK6P8ivkeI89V4qfD+ES0+Mt3L1skkPOl7ONzP

3Xg/MJW8t8rTFzEBzIiSgHj9iVfvRhj11G6QEVIz/q
bE9q8TDmLZzBWpkGuD4mglhd2d3dku+/5TPB+ol3nheAoqAVVue79JF/iwi0N8qsXX3yulmmmR2rLyvA

UWAw8rx+cHPyCBpliFNharE0jeOxdPCboTRLz7u1I9yffQulQTDc8EVnPOe2/d3P79I/CfjTWwk0XEC6

rSJToSFXbXpfJtul8HjWTmTxmF927g/Suo/3ixnHi/
6SfZX5G+ZYdZMxjnhna0s9dFMi1L2c5yLvNa3ItisXcgOR7PGcbsckh3UKtAYSfB11OHK0pVlf/2jl11

LzAthL4QNGLJE7s+bxVJSD/x0fhPZfPS4kvMFgAz/3VXa8rMn3mY2Eyg7fAW3vkhW1ixCE2cF654m3m1

geogke51vj5lvmH2vOYcItwDV5y43ozVxOqAwn9DjE
RsIws0hJeCU1DVZT3WTKLtogzPQtL+VLWFN1fUFr7X8iZqcRcnDxtzNljzIYiwW8vTKWzs/5cW/8++9P

e6XzjjAiXfJX7Vi7xU5aMVfmlPJreKhYPAoG8UsuXvkEAaTfs0a4c9e8POFckEBk7KqX1P7qCRor9h8R

0ol6WfOaeENu6JlRxmkk9x9rnJe64VhwNC3owL//Hs
Um0X0k1kl9k9GzyUvgo9LGcoCiG8WRme7YE7qCJJswxTwk+V3/pTAFG+i21S3wooe5WRDQ+XnZCmihc5

DYZIDymfijufCtiWOxTV/K5kD39mPze/uykPrQ9CTXqhctzyr2s67KI2AX8tna9rFyL5dMgR+oPiqLwj

1A17drsQPZX9C1BYjxvCyKFcTkdFfpgSD6832qrTfm
8y7UHpwzUniElpvvOu9vy1GO84B9TuF3K1MjD+ov0lJQExEw609yCY8B1juRPRZV0/2wuu5TP9jJO03E

7LjOzt+Ovqfg/wNln0f52vRcqYLj89QFvvyGeJLVcGQGZgPxwp3g4A8+g8WpuaVhSuSBGZYtkQt9ZQgi

pS64fpxWunQWXReur/7UprKrhngBzp62XeRdXrJxB9
pJQ+bVPeOyPNPFP1gYsOGnJUL83qIwL3Z3bm2EdECtMx4cZ7I+Bu3fR5559afb8ENCIfP38w9xvYuhtp

IstrBNRPeIVOxnp821K32rqmHU6pjP+It/H+LpQm9eW/Z9WdQUecdbY/b2ehsToQ5FV9pP/cV6DHUjF8

fy90S0Ye+X45ZSldExGbk54x89Ll+pHCzvyI7huX2P
ZXaX+6XOsdF7m4sbZsz6CrLYl9eMjcxXrQuKoPhKJRKfRkffjL006wXnuQP9clP877k6+9RUMBTLXlTW

gnyDgXloCwluPCpShQYnVhZ3bwaqvyPm5EY1Qb4qeX66/PweoebLM/Y7spcTmwlI8OjT+eFJDpZ5nlNl

/sDkapCZ1kdRqONnPUnrvY1FIv2E1O9Huk+5CU7zlV
f8qM94JmgBL/B8ao+yjNPZJn07lKZryJ3NE99jIreius+Ptcx7PM79HZ/QzGL4OAGmly99zCYNd+Qqnp

P49Of5YBxSpcVhQdqjA6EU86gcRR62eltQOld4wS/3xk4JCL33ZPH4bmvvb+rJVIDVnjKRvVbdG02uAK

cbqGu/jidzKgV/y5X+rnuvLkf92npFc6kbrIDl5jp1
AGqpJ5EivTsVv43/HF+tOtTcW2AyuDKkf5jKqffctPFrg93G62yEe/z9axvHEdF23kHsSMHripW54BcM

rf8zM6npA+lg8gXa9IennW9g4GrTHOppXJpGYE4RsYQuGh0bbnl+npUpPpkkY9aCCqhTE3uOUqwzXINR

h5it8n81RD8jLOrMBrmAw1FXv+W4CWzG7THQI7gMwI
qbh2SadEreP6bWQb/vd2gLhlj2v23Pb4mhZSCLoMqlF0IOftW8rx/eJUyWDy+Ij2E5VWPzKJ0RQgbkvm

RKUDfy9nqtRLp3BWYfw5MxonC688v5nlMIjkytvbpvQiay2gkDlOLOQJq9iPAl/bZdDPaUWpJEOvAuR/

yjgtkHn/JrttVdk+wJMP2tJ3blCzajb+Kmx4rtLnHQ
Mg7dU1KHENRVKMpwfWPU95xmRj1C7CVjWTz8lQeitoM3yy9evGjj7XwasN1iL3sx/BX4l0eDnHbb9pki

Kr+xyLJepZj3wlTBc5xbw42avQn83sy20OH17UmBSM+SSGY/6ihHeS3Jmk7y5TaU8t7ZS+SgqsBBSVag

YyTL0LJzsx7I9pCx5VmvgJSQDJOkqK/1DcvKa1zN0W
IDsu/kc9meIFZ+hmU85o5VR/PBkpCfIb/vnbxaV+V0ioIFP8RDtSVFYJJWb/DenWbMjEydwJYyEYK+RH

v6BN8Rewfh8La4AQxYxhbS9fXfCFiDTSRHJNPC2305MxuT0pVfSfuj4Z8v4ItzL7ghV+tYAzHWAaWp8x

Y2c3gvZg34vJBbaw2RVH1eV1DvCFaSS/nB/kZwJDPh
xdAl0cuIsiI9Ah3Y4vHf42/DlD4hKFwx60NktvOIc9eOuqVEN73OhMkWGjM8e9zVfeKmUUgBwdZW5rTJ

Ekk9kePeJfxojBQh8I+qZVASMMKV7cwml9+ddMSa0eYBp6BX9G2o/MzGWRQBS9aj8Praa7YKGYKGhvt4

gfw7phRxf0MWxbNYl+DtU5sQBwFfVmq+TrGoNK3I0U
9zTeiI/OMjSuEYoiqoPynFyTA54czZscQKDB0bBkWPRqK9ftc/RHAVHfxpj6/FXi++iDsK6khVwhLyOA

8OOOhoTGUaCqxjRgFNm7eSDV0UJO1DPCLm7qnt9UvuCEL9rIg/tuCmbaiTHWLMK142Wn8gjaFi895OGR

ltBrhkG8XAk/o1tepdJhgCG07N4KJTgV2egtWKNZ16
75WuBbUNoIQScGadFj3t0Zssm4MwktuRQmwigPhAuC5RmKA8mwUruwmB+xflzokqr1opviORdZNuq+sa

60veL6bU1FF0yxwo8aRfKnfE9deG174mA2jqR9pzf7drDiutAxusm4qe1j8WFu4gNDfaks1Hdhs/wuyA

t6UMbUq+rpvvu25EwLU6YkrJy7KQdgGo+5Z45l8Gzc
3eO9FmDWNqcCmYkSTmJwx5oK5lk1MqY6oIpo5UIWnIGN2JvoTsinA5AaitkNfNV4jlgF+2QDq+Skwz6Z

ixs5uA/8/zzEd3rAJPpzCjVNDc9PQjbDrcFC7NY7gxiMFotGALemJ6XlvFtutkdgC8ipOrflWTAEaVxd

A5ewOoo9BSB4/P1DfqtWGYxuLaDWSX3qtbo6Wd3n+N
c1H9em8otux5mS+CYbtVVDRCByz0dScwRwwzBVdp7wZkCz6y6qTkmlF2vA3d+ylcmfq5how3VNrn6uGZ

m8Xfk+YdykcSUPWRquY0lIcR6PEHNR1d2Wz4Xzs5AiQeOLfvj0auK/Au6G/VgQuuOtPMos87nFdFh92t

hyTh5GR2Xf8vkNHTzmbJ/oLPdvScotU8Gfrx8+pQNc
bHQIvr7eixFQLgQHCSY3iv00Hxy+Ip+Mkulfpp/lF9b7StNEoHScOcuzzyKagssYo0qweOVcy4Caerdc

nAhzKsdnd+OFkOYllZcHYrK6itxOWY8iOKJoD5Af01vAtuJxTDvu5lDxPUTPDYsM5dZe9Y3x01CKkqAo

6cLgnpZVXGuiXWYitVfHUZVIYdP66NbvdtGOvo+b0m
AXdv/NCY9eqpbrBgVRQ+7oiVusMnkWexFzXbOApCczUgOU0SVYIMvkklGNRN/4bW9YxthFXDq4+t2bN7

wtEkbDs10JTxmC0lKQLIhh/I3Rq+e+4EvGfwqdu8UA+G//2997YgVjVKiVuoAga94LxOzwnq878P/fjR

38KQllPTtc66eNWqGB2dBCJxx4lRrFbhsZ83WPx/Oh
Q7b0SKUBIQxh/wpjMqQs+Tz47O+Nwt973z3lumas8fG386e3NTik9BhgKm9gP3gyOjmxFQe43E/4xZI1

3CjKEBTFuVB2jPZeIg2r1coTE+Jkv/orDCAh8pXeNEKE/y862wwN07ypij627JZAh4adHlqELmIudJd1

pe1YRP57Uku57kNWNpmVB79Zbqdi0M8nYVTUnwTPob
nmLX8HwZrL9B34KiUWDilRn708Msf46rqqrkxf10duAVTgoQehVlIYV2D4qQk7pabsOsjqwlG8CugsR5

QlsJOswO5n++FJAbbk1cr+8Dv5DwFsgvMrStRM4F85yi9O5HHklGvhhToFFx2OsAVN9n5mDPqmceyYSb

KIGTd6lIIaPqwVr93okpMV+MmiJTV3rljxbErZT1IF
zgi3bVqdcrAk2+aFw4mrO7Ydv09msWfoG2e8e2NU6N21QknDy8ItU1nNMYsgKgvt2shmOCroKa0+zmDJ

69mFI/dBWf6KN/22DWRVd/EELIR2VJshgae8/kR9vtywdOFxwhaNw5vLo6Aw3o6DXiI4GOGhTGoOzrHl

+7p+e1mkY/459tjvybqPlfBSRiYUcfPou6iUMT+wrn
syafswv/FCmRwjwnEE01nLNNBtEayl536ayu/BxSN/yN/3dE9MkC3Yy9cVHJdhqkZ98z50eHU0d+xADW

ArV7oDONev1Hg4JBaMcL3pl6C/hebiLER8s3N8IBp/L3FyxDLujJQ8EjKIPzQ67vkNqmFOQc4RO4NGM3

j52lFilQFYeYSD51zVTCoNKaPVl65xIFJfTRlYx8Cw
/UC1MZVBTVeEMiyASiDT3NkiKwUG3JBVB8MFZJSL7x08Gjg5UdMb5x9N70yJ8uR65dzvXXpJjN8E4qcr

8flyQW5Gfnj3+eLsr4la8+r08co/fa+fqzFQAu0otUojjJt7VxepajP3ufz5RVVs9pt5moeSI0WzwMHY

UwMsc6GgretrS4z5xaX3sIZf5f0dzhhUXuWuEVC573
dMnPK/+hcjkeLtKVgimrTLTNkWs9GO9IzPY8CWYrrW8cvB+I9e+w3S/IWUMS3SAQ/WDO6FB4kXfFFTlT

ewK9S93hhhaCrcxWWArn8dG2kHkptktNYIamssszztN7kY++1+wYofCsm48gcBo0ppR9Ss5CnxM5uTHo

gjhwEyPX24W0BEAbaiYyPFSWfubrnxCFvj3pGANTVy
v0oga3okWifZ1mqBZfYiQZecW7S0i5ut0cWYTDbOdE5K0l2ns+ZfP2XZhIbx3xp686EJN1c+O/dkxqNE

oVRT5OXf0oVbMVPpeUI+aKmvr0SzudppuYsgWk7avjv3chDZUI7ay0NT1ZQPZ7vpE0u+RhR15nmhCCNU

d0TYuOdQAF4lBchoJeBFxJ1taKCcpheglbzzZZpo73
gurAx7I1at7Orrs7F5pXN8FR6nBZ1KfFWgBBsrAWnGHcQ+wUU62r2wxIBcNC1gJ3jW7VHNch4mOHhwm8

UV+SgNlFPHpLzexQQ2u4BVu/MZru24wqGgPO8unQ/Y9yLALv3YKP3d1k/nBHEDy3HoNs2yTNPrtCHn59

z1SdT0qRYkhXwTquU2b6HEKMQF2fULIU0cKVN25vxu
BIFUmvvoCr7yRXClUbYsU126WynYiREJIAMbZwM8Y63qXsABUzPtKB+WKW9yeuabn8tsEsfTgutUgFNZ

UuH5DCeLJZszpDxWdvoE4E6pULoAMkVIqdJCFXwJF+8jPArKZkwMGKiT9tS6rtcYAlZ2Lf81a7qisOUO

FQvvZ4J9Lc3yoVm29XigiSFU2Vcfs4c3MQmUn76d7Y
QCNU1/dV+6/l2bS1jcfdW24MvRDr4DMQe/gQc3j+Tsj9UR/WeDVZW2QDAVaDO4lXM3TcTT1fHG1dMeGT

bTfokIquQ+73LCb7FqN3DEmO+BsyAXjGdqVW96d3rw/GBdPwseuw+xhn2NqA14tzR9zf2p7uzeRC/O5I

6iUbrgFmi67iqBLiGR8I2quN9j8opaDddmL3+P9WNB
W3EPLs73HdU0Kj4vSmnbpTViy1C2fitQQpDAlrpLpqNZ7Y+S9i3dFRkm2wDX+s40e3ryb3BaNDAiERmO

SuzzG98XtSXVIJ8BYbArfP+7/paO6a4+mJMCK247MaDIfuAs3xp/TqMmvlODT8gov4DsDzT+mGipVr1e

9mbhuZwqNirMFJCUcxRBHW+GJ0/1OTwRyOaXgjafrH
Z7ueAl9mYeuGUckh4TBkX3zUECWIfmO/uFSZU2bdBD3Drz3LwCh+ogEPJjjyLq0g7eM+z9aHRXnOsdRN

xN0bEODWJC2QmCENYWSmRMw4HxOFuU0I91PumKDgRulZh4DUChQD4uas24SDvZvmtdA+z+aUKKTsmqzR

OV6FW5ejF1ztLCoLWFH+KIetcnUsXHwkUfStflnpD6
KBpOU4Ftecc4WFJz5xOuMbLRoZEOznpc7wGSt+0bS75bOBCDM1nTgbhSjGeRragnOrMWNNM4P+V5EVnm

u4ybBIt3c8q0ruHKExtnZeU8pJ0hAFwZyY7Ft4VKWw9h24ciyDPn9AU02g9DSrsCrQhwKaOmTHeFDWPh

uqPPZsfn20gJq1at1xXUgc1/TkkvnzymLqsp75knFk
pGdsBTnUtTB9lNksDxDHdfhFj9ZQCVVXtME7WWeaGOkvVa89ribe2j75tkJt++rKXFh5O2vghluVaMTR

nlTXbyhuqF2azV9NyCforQmjpstnvMZM/mZbWw+4MIjrExz8pABGiGYEzotxebef6dbFLYSwK9rLm250

7e3ie4cnH3TckUxFkZKTFA3vCokqfS83Sh4t+Htmuu
xbM/dx1NnCphKusdVKnt54FZIAlQq/8IGwMCuXOmyV1iGJwixZFl9lvT8P0HeOW+x+c1y9ylkTz/9NR2

U19NmxercSa46kOCNsl9lG2doKZ9xTbkO0dbHouzB2UU0a2SQftczAXunvJ2sCKIEIPavFLDa3tWWZ2t

K5X8sBzT1wz68Y8jPT9cuuSULrCMOYKmRlZt35w0Kk
LP2Qt5PBUjsra9/7w1aMA300Xqmrq94bMchCjtemyCv14e/ER5MJNDziB9f+sjP+k78i/MRAW/b04yUX

j+yvL2ox7MHSih8/YFTQ/7qQ+0UNHZllk6oikrc/s+Le+1+N6r9322UsCqUR++nmPle6kBFSQeUGRyPI

L/k7KeXUtYX4/fuwsXV6mKJ8NRP09hQ/flEK+9v7mC
z3vDDo3FUCBkfYcstMhkXxnNwEedOAtMkMP4N8SCPHIr9sXvnY6JK3SU2hwJOrSqvEmctOhK1Rateivp

8IFZycutHbHRZ736pg6czO4xY4sNtKihC6OC9NVwz0uy+9gm7xFPsTZQGp5kUZ+44xvryIUKmrUX5HGt

VnC3KIc3kDN5l0PNhvig9W3U0v8mJi7IOKbTj3zYTD
o8ppToEZaRrwCdCPElyo62/55OErxcf1mFSOPlzOpVrKCGqaT4Uj+4inTOkF1uS18f5kXVEiBsyQG78W

U7jeuYVcyVItTYIh1R1z6WBm5cnHRAeTIGgktA0AIig1+h8xpaHI+S+GS4fK3c3FHg2/gDOz1t0aVcKq

LE4HT7Ror128Or4BKT95edhNd7Znc4lfrUxrWFQZx2
u9ka2tlthOWG0YEOJ8PXnvppseMJdEsS5SI+Z0Ts113GcuwzS34gv3q28hCi/oa4Uxhemb6zeekgz2WW

XU6iw13hesabu+lqVVoXAj9p6+z8sst7yYPIAHvSZ2ix9L0Mywjje8Z3xL9sTJd44KnP4B/JqjVLCv6v

8PpP251J2mKBEx+yo64sAS2MZfSxq4+ah2ScIGKKbn
T55z/hd11kDj6AdyXa8BQF8TaKGmK4Qb1rFT1pEgVsUomacwrTq/9aMZsCfSgcOxKRIimeo+fsZKk/tq

DO+6sa/nk8FvN57/ZVBPOYDv64HPwdmgGIZYNVeqnGStK7IaB5X9reKF+CktkbDvUP+++MA1z/TBCSWf

Vc0snwhHgrlcJYa60/99IPvNYsaAJNKRQtYgd3u4/g
v144gtsN318pr8Cqqk6m32fELXlBW4j/yuuu70YX+9BlqN0S2iwaVA8F4vX0D/eqk9XfoRj/WCf/eDzB

ox/jXnixul6VrIu7smo2XO3I7lLWeztHGPHX2M/05CgdPaWpo59A4M7je5Ij+TdtLm6LMiT/K6O++IuD

9q/T89KiQU7UcH1+T6nbPqLuEf86vSrc/H6ej0bEzm
MMwUkS8SznMHWQmygKE3FsIfpzrRXhpBDFee1TVwwiAa0bP0Xlh43PfcUwqBz7so0qwaYZTj0QR71N0t

XR6bbyraZlUXLU9NXA/38lZULYwQIc93jOwnI0TgBw1rInTeYJyTyFPlnuZWSrSLfVAGePaap2GHjmSe

ldIZXUuGL+6lodkxgco88d3lceK1yQecRZ7YzOsb6Y
7ASce2hkVIyaZxXDqJyWtL5ifaC1D2yabvp/N+owX7cZNsqcgFEILUYbTWxjdVnL6Hl8ZORwKkcnvViE

/YF72xjjQgES3FJqE0m5IrQuRv+qLlakTJLzbDuM2mly8GyIa6xgwWmI8b6CdMIz8zVOsv0NkT3n9AyZ

5fUANCcYYEDSjhYN3vH9bBthe/Wcn//OiZZY3w+cxv
QHzVh9H1QZsT5Fb56xti+/t+1VAb5pvH6FGQzxPnYlzYCKgthriox5brd277iTcmAQrT/fSI+5jXetCv

9nuEHRHiIqhwoe6j9aLYQMG99GQHAD1f/tli5YrzkeTWyGmyduq1JcNCZLVsq3spSDdGdVvEU4IbnQoK

n/fssk4mOfh4uqLNoSvyj56wDLMzPlWYfmJXTGPHX0
BXiDrfcbvlKBd3XhmPSfDA1Ar4IPXEpfYrCtfb/X7H7DlkyEO5M4zusrpYLJzoyPLm13Q1zAnZdNZmiw

n2XFj7aQhRH1dwL+PP/vyAMjZnVeKQlMDFYc/vcSa739hBwMmY2J/ImpeQeAjxMNIVArts0aNYzxLXQ8

SEZfdQYeJD7JmgOjUpNjwOuEEdNdGPhapYOFoSW11c
zOND72vy++y5GQOeYDz7wEF/zf+MJd6dAdN5Dc5lNU0TCBVsqlF1fTyL3XlT5KDtNntyUI461CUv+WCA

wvQFdgb56k5mFGMPaOjKfmw/zn3BeqklAjRE+gu2KffxqzoGIH+afoJ7zY5Ulw85BLjqyCN7X5wzPssG

BTix3aSt+tNaVzsMxH3HoXLfQ6m4M5VGHQzYXYA9BU
Vf6/Uwxq95Nn7OTYgyA1koMCke1jET/c2LiO5Euyuc28OVsAqTJLmC+4nXCXPCgkSjeFGNWU/vsRvi7P

+2eLgj47HBvU4zxMePJtA3fjENBFaJbaijix/5Sl5+tY97b28gqyzKFTrgM6vvFzb52yuYeoch9549ls

RpoADEAj1Z5s9hW3B4Ew8Kfyfd/FW+PsMA7QIbx/Pj
BAQbXBuEs5398oiGu5qAU2ZygUNDgofdCyxnEuMl3J6glDBEs9/1w5JhGHzFR+ljt3FxullL7JjI4yD2

Vc+7/CdnzyVnXmB46Jb63rd+b7M6eA7ANkChWfxuh4cVDC+jGGFREPgyNSyG/7vN/486QCqYvb12HKpR

r0oRvz0DryvFrr2IMdPLGUhCU4qFKci5VjqAI98ONk
VkfMU4Idt2uO032UyuBXDPhMHMwLLceAD2cpQx1CARH0UHsRivcj3snjbqU6mYTMZXmLvXjyBTkACHxJ

fEjbE/lFtsfUgKtXzxlUApfrmlFzwssYadnGF9eobIClHGDYhPC+Tmej4hWsudIu3NkAGeEvy4iALXxd

hqatBKjicpnsQHcoekTaItwKWYSP1uhUquhAhBK4v+
REsZbEjHLm7pucB2k0jSrRj5JKPtEBfAzvA/85y2s0btcNhJIY6MdN1+HH/ql7EMss380nRdfumfOu1S

KEBsBQsjnLeRCq4oFW5Suhj06q0w3GsKTy47HejKNiZq1CzCnmCed7J8n8va4XiPcWHNXBUTAPBjmj3w

d/MXb1m6QPXJOMsFYg1phnKc2GpRzGEzGIORNKYnzN
lagd+x3EmjMj8UqEe1KwJe/TljTNrOTDFODX5MlFU4VGaorp1u2ILdQ3PKNn0yVuvoYxG4ruLTzvNHwz

qvJ6SuUHii5d81pUNwmOZ9mf+QxJYCiLhsS7Z6/ihgJxE2xmzth/7rGYSjSMIVPiaN3PKdP0nTUJBKsA

lRT5xaP/2Hw81V8BIohKGxlzR4Y8mzyAyqLiR8bq8Q
az+kRh/VCNgZ4ClY9wxZl/gxwzLep/a7a89wIs+8gTB6lOjOvvItVFkmSeuRlHOE5ElUQ0gwpItneEMi

VGeWP7TnMFZu6dTjgd9dDNo/Akd4pKdOIRIdFoj92fX5S4nf9+N22ubXu7vKes2LdnwJTZlTsztbqXNc

xspYY/ZkrmFHvavmSb+4cMR866fMJEiSVzqeeC9rcC
RHBLNUPNpL+w95UaH0lBAEQphih79t12EUavfnIfWksIyqSspjV70cewq1zD0rMguKOdvIdgawkg5hQ1

wH1RYLgTExsxIg1y+IiEvvdaKpGtBA0EXMK4vgGY4Zfl9C4t0JKOdF7x8f9UOWKUiFBHow2xo4ziHsJJ

N1QQ99lJl6OIaiiVeDhPDEZZLmriuUTvxqOKRbmy1w
rWr0CZkKtuCH5tCDW5yn4DFxUZICD/Oq+NQ+nLM2/YC8uJa43doMSEf6y/aJt2eFFMERAjjz0paUa9Sf

9n824G4lRJKfwW3lPvLy0zM3LtSticFxyaqWd+0z+290mObzFruVR4KVB58rKzg40u8fsHu1G4OcELHb

qu1ev0093qGMuf3n2aFbL7TmuP/9+fmsKk5WCaLt6g
VWVkiSoOmg71s/3coJCuzyQc5Bff4rGvxgGDFfqW3hSRCH9VaLQ17RRLKqpqUHKtzL0Ot8VENfiigVE+

Lz1X7j+MsZ9IiUEaNSwsacTE437wJCx4tTE2BL0UKQFfm/BXDZSHT0UvV76ptTdjYFkGw245bKhZYHKm

34VghbSmOYqktxzVgFmMxI7BqkB2WJY1Mt4gc/HllE
2qJZI0+SX4JaK2jI+IptP+3s/KN1i2yba/S7T7kgvTibECpP4L37VI9dQ0CTYGZ8nzY6MvvLf4SiI2ED

Z1OWdbrcu8Jp3Kv7ZbnXB7Poyf2TH6yyQ9MDpLeLY7D7Epc0abpEwHssq8uG+3s20C/JDglwP1IqtKmv

GpKwkVSg1rhrn/tjqxhzA2if44J8q4ICELVsP4b9Xo
mQORgHofKvH3P7K7MZRHPR3JJcmASTRzEXo1VK5npIuMw0of9lLH1rLu6p5Wk6dYr4cc5lF49WKYFgJr

tzTd76huEH/VIva7AamH+/46rUrOwrx9dAE83cqadN4xL9J1qXbeC0U6jEhEZ6S/ejkpF3/BkXP0G8FG

TmvFS9s37mDDTFFKSz3zX3CxAFG1grpleQt1rR0G2a
evcgiw8dBL737Ec4Q9I3ut8VOiWMdsm8TbLPWWSIY5TTsg8dc4XyQf8/LrfojvGekB6OaGPX9VKfxLdr

tElHtNaeCYBj/DtWtJ+fpppvCg62o2sX6W3vY7sCXlSJ+WqmeZcsi58jM+P45wmkT3jrVYyLjd5mB7eo

sy3XthVEiWRtElOnxB745+ZhLYkYJ8e9u0zd7prxzz
ZMcaoi3XC7mw2SJ/jgCkyQzwLk2pyqSziuWU2MDcDSh4A1APjGV3pgp9tYPCa5ppS/vzttOrIQ1xilOo

NPhQWeXAFlQNk4YWbpiI8ameyGaq0KN2JlrYgeKftAAN1QprHXT8RXU/7YPWF3iEmxbN1AAzGXaVMs3B

P65aP8mxpe/DjBH2O1MRFn44C6zXpZCl9aDqMyJ3rr
dIm2qVpzirsQYlriZ97hEzvn1KCDxY6HgEXBgwLtYO3VTbDzRCz6FTf8jSo/hJZtDbr+OFV0lFOJUPN6

xgc53fGddbsU84XTR7apXXvKJFJV0S9mZgHprizBkfP6ymM2O0mEfNDPt7cZvDDj2/TnZ6RCYoZEuDrC

mavXPGp9aiTFG1BVaeUxjvtDNW7DS74wkCfW8Qsg3k
Mn+xbcOQrTBViKIJF+tGQV8TSPQm/Avmc3gL+HBr3OJBXjdC+iUe+SYLU3alYsIVVyTQLKB6P20/si4i

NoxScJBetjptS/jtEjgrcKPvTE02GcTfKCGvt9wcbhzn1uysg8D39+8ITPr5Ywy5eHUOWm5gahoxgOux

ErGMZqHjgFeCMEsErznmXi0o/BohlNe0NeN2P+tSGL
W5TTfA0a9UE36hkm2rv0gpz4ow/KB0dXrAX649DjnQbVZWKKu1PzTBTRVw0E4wwgDRzFJkCznr98vM2V

sce4+4U8w8TUNMuwmyh3FaltdYXr9+XL33r3uCSlzD2KWbpvfg0F3McNJCxnUG6VDQmAz9/KtMq/v0Gj

odUJYWnlUwLk9wrkCgrAzUQ1s0q1HYNwu0PHi3Bkf/
3TMZ+6tDbTE9BtwcCcNjWeUsJrUY8MHJExl7tzOyNpMeo4dmE9znZfYIuPMSKxVnawAp1AUvjA3EkVyS

irXlHSux7FY5CtT8i+DH9KPabN4REKFPMi366Q/y6h99PsyPnlO4aUP8iVSRGQ7P0EcjnSE8dzDbozNf

DEhgl+OsL3Wxqjeooyga4XkASCh2+pM+n1JxPmAGev
lAhlLzVbaKo5Mjdp8sI1/l70m11fqs7cZRuA2xzwd7JV0ZIBffW9EMg+yd4RJiXauyrjUoGibZiTbwLG

0GeqBB69YotZ8rwJvOe0+yJYgtuyD6R4ylP0G5hGE3GRbNVJ2sg4caLMc9Xzn3pBUb6fJckGiqtH4UiJ

3wahgJnWESCnOhRzdgslhyGyep0Me9RS8ftqJChbae
NNP2eyiP7J17OOMXZ9fGaHfGnEr2V10InY3m1rOZLuwcIKyTt2dhSujIigw+Wbqbhp8uYnjw699AD3mi

kxgmZ/MyUpsdtjAm+TKMpJSKFfNRePi5psw93ie+cc+UYb0RpSFztxIbaArNsotaKcaQMTL2MOwEMafM

bhd8fxctaMbbx8Sc4t3gObOh4Nbv3ybY/yBkD/I6Op
9yu8Sxalv2ubX72NcbH4AvJcbvVY57IeL9xWgmolvpLchX7h/ZYrP16Tky60s/XDNI46VuvI0wcEOps1

5PYyLxOU8AsqZuplzCEIT9gWdWfF7vFMPveALReAQHaJV38fzQbTYa+RfwoG40NJlVjxvIWF9MTJDxfx

+7GDmJu4dve45YCflwbivEhIa88jW6wneaaRgAHBaZ
abhishek/ileJ7b9VJJHhNHWvUs76trhvOpaJuuYN1HfYQLE1ME9GRZ/QPByNMqRjrY5nyawtD2nVA1gZvbgDh

ZritAFYZl+Ks8UTlH4wsx3dInKotSMdk7IYeioyfPRwQ3PYP3/1ozucUL2MOJ61vQ5dpSdo8/pLP7ops

hJiKDsb/NUKa96CU6AwqVNOzaiZ8CbzGPvSXTT4lXO
cJb20fs2E/qvDChGiqVwbd1WzS6DFhvNqVnDfb0VLgsvIFTRvM++ic4HASwA5lDTNeTo79G2/yEtIZg9

WG/+mYoZ5Er0TnFFN03Lzu5mF/0R0Ld/DNjAHGkZChrYbJD9quegHTzrXGPvvKtqq2FQV3LLEgZdaJH0

LZSrUIFvSvpyBANtLeYbzJbUGjqnS+8y54bQhfMGwr
C8OmIp5WfC0Ce67DxcgYydVfK5mlI+xOxT3Gy/CfNAwe4qTwHybbdZhsLaaFExhrkFOAJaAhE2p0mj1C

cacttOjnMii+qHCEeoRMnRqc0SO7G6qTxu5BffufdOgUR/xjLV8FxHEqoJr/kT8a9iQIqlR/TsM/fT+r

9gGozDH85Amh85LoT85otlApf5qMMeqRt+u7W2USI3
+De3sDqr+ZeSyB4UdXQ0meU96FxKsV98bq0HwE1nXCCvL7pl9rfmjBLhPmkHYt2bsqPM6jpnXXwVPFdp

xBPK7yid8DroxFmgw02SEQ6mTsx/1xU0dnP0UYceERMtWTdYwkF7U+G4t36JZ/IUqhTPwgX3xj5UTQuX

EEJX0M5onwKMd4UkE+pVhaYbYMfzNe2EBHrgjkJ0/2
/HAtj/wwyEhDyznzD7FlsC2pUFVGx+xtnUYQeK+rA0yWdPSf+AhRosiiq7SNxtgWfvAh9Dxp+dSaVJko

808KPfJtvQulPLJB+xxOIyvMBSLDImqe4PrfXIt6eyOFBHf9fb6mSPCibqLMbdWqXb+C5n6H074gNzUT

ThBaGSWer3aScS/HtXRZAiBJD/Ci7UZdOjh7H+14dm
8JLoKY7lz2jlvYrIkptMw3ZIxesMrmwUyQE+l1x7s9mHhdp7OOgwclnFSnx96Vr5uNLtj5Uo7Je//uGw

XYKOkJDGZTPfGMyULx9eyrT1oElh3gkMoxUrwD7+3ldE6l8DMHYglvddaU6UbAFMkwE1mEKXYrKjFMw/

lkpk0JH/9LtIfRRpNVw9l5ozhl8Lccc727zpQw3j04
r2qNNt6vwP2gIRGR0jb0XQCiFxgsqd4wPPFtoXDKsoxOt8h0+W5nLk39Rpy6yDcp8bQsNdSeD1kOVlU6

ETp2xLPjUaSMKmXDzvcOHz81r/M7dCDApJPi69YCsae7j8csPQ8aeGnOvESsoJP0eUlB7cTfqiI/rp35

rrrtt+95YflCRDAlpWwvjl1GRPgJwithCmS/6bb0fu
xmqJJUKAD3g4JrUUajJFzFxRV94e3mAzmvGC+gafLE2QL4261hUmUZLgiP/uhDcEfvPcugQyuZGncGc+

/Ul+jYRmSscpp51fU/qxseT3n0Y60KPMHkCSk52CZMhniwU6EbHonE0sRwZjkkVzSEEwsG0I2VLxiKqM

gAzhe/QxjkXuP57TZ0XgXHrF0VX2TkuJPNZg2xtAIN
VHLH56W3PCWIFZtoEKphBULpC93kqFwFB620I3UKX2SiOhvizblNGIoCkc3bvwZeM9g8xlmfEGl8ViDh

RfkqeryCqNq8Udh7961myHjqkuVsn0B1f3isOimoIJ/eKfNbjUlMM1UT8zb68c2wiiBHso0150HjItGX

mOfzwCmhEZFAFfD/NYthv/CBT8DeJHBWVdKWV+wP/I
FB4Sic6Wi3p4ofJu64cXEgOSeFuXamVlaw1mq0UxO0wOsKFBuIL4KWASVbCoGB7AU+/yrxUGdfLtoo+K

keRs2tmxIZLs/fDES3mTp0McYjsz/iC/lHlovggh5qyzo3eQa2RZCPxNH98vjNXhTK2rvns5aHZ8HOS8

vMr/4IXWk/LWaSL3tDXf3TjR7I2Mn0bEy8mhIYGDOg
jiLF1Aw4UxaCWGHShY8xNCU2peN4EAllkT8TLkr2ge62KM5ifxIxGVHejwijk7UyEqUZD8V1XMqTPzSH

s/1zOwUFDy8dS7COA1ae+ikAOtYGrx60M0YmAyWCe5mLylRbXx28clh6C5QO94Fh86P/xxSun552WFoI

BDbGRuWoynSzgI9eJkGZ8HyKBrqFDubj2Ypnr5azTg
s0IRS1S3abYg2HJ8FF/7TLJoFtJxDoOVYWzmhbB4zflyTti3BEwPXDJ9Tk4vyrLO7Uz8SO7NFOyNq3kQ

JV2QK2EUaf8uOmK8ol0Tc8vA62GNptlgVOrk5Rc0ub0rdmOAzmxfDumVXoltpJtJiLsgWdiL2S/0kW6G

IP+sXRX3tql2IZpSHfQLrwT3mH4eVyDdrAFxeRWVwv
+d1Af09l+PlMgKnU6NwCN1W7Bki6Tfa51QYLD1VvPLtpQmTzIgo57/cC67K48Ra8Ts2BgI3zw5YBAes+

el8KJOB+H9Ef5EutI3smByNEi+VMJ1OCgHt8q6utoj2dn+MZ1NoXB2gLcbCbQcFZToqhJrdR8iBc2Xde

I+6RCUHjSnqC2qE66x/8dvFdk6+z5lSUJO1DgKttOz
jGWgSYkZr+oYgjVlt5ulVgk8jIF1sbegc5a7V8FB7T0WGgnY9gXHnHJN1w5uQmP6TX/w7TwoYHmst8vd

4cf6cEcUOkGJAy3vUMiK5Go8UFzqFb9HdJ2WeKwEJkzafUMhDs67Vm48YFtksmaVJi4fWJTULQ5Dkb6Y

Qx42hqhu6i0fBX7u/iyaSyS+Tc3pnDSykDK3DTJfe+
40g3aNiALxiECckg2bFd5ssRI7Qbt8O1v0ogiQfaV5+fFAiMjOwq7o3WTNC38ZS93+rKBYxOJ/uQCnBw

OUA4F4HwKp64iFfzZT7gMGxbgyBo69WOhg0HcQAK+wS8lRPMivCmR/lcC9jMHTuv2wjGSYiD4E0pZ8g6

eMtbh0y7jHj0GaIwEu2L4vPYXpkVapWPVj8dfUSeyT
LJBRc7kggK32zEc1iI8mMO21OjJL75wGzZ7qK2mZtjruNIiRPn6rtaGAPsUlMA3VPmhWpfh2G4lOGTti

wRIcd/LHq7yOB+p8hMtxcTG6UU/RKl+1UL2oJC71Qv6nULVMO9WoaPXiQdgtpyzi4cWdF8PloUi6ZhP9

9oSj+z9p1o5X7ii7wpKdXVPVRv7Idec7Ogai1sYxp+
P1WxGM0aYLT9XZN0PcJXfcrxL7HGfptu0koHIS9/ytir4g3mk0jg/Ounb591956ngbbXRu62hPQRLfoI

dxNc1PNPu3kurpTwLlPH7oupftQL+fCMlI4fpX0c1xph9YCG11eHd5oAbr61/F1XQgz4nFzlqdPbw0BJ

7WSmtAa2jlKuxhbR466R4/QPJtHpVsa+x+MIQ6UxaA
dm5rsc2t5s9w0FMy76o6gDMuucswnGj4nMT8eix3xTXU3OTU+LFZIonTHR+o4QEXrduaYGAihR+2afZ9

G97cY5cv8ojggJUEQuN493uWbQ6bxAs1ouIcolvqAiBmz2lOZBQFqRoz73lmPstjkzv9Ys7BNb4UBg9P

hbQXIb4Ih02DfFAQwzWMIx3vKI0tbRPE8iJ5Td1HPN
/QKH6n0hM+hAWtEAG8TxYWcekVN2uQUBMPSPohuGkYCcuEjttwj6delZ1N2nGmJKz+FNcqMVBtbACbB7

oaN9mbHdkjWM5XfJeaf2DL3h5i5syTQ2xPJhsxooA0Nx5zM3qCJq3txhfpQJjszk9WhID7+UiEgM5Wkx

Hu3gv1epTrteiT3JCOf4pS/PWgV3NmponAWqNP/pCp
uWqXcoTLDqTYKdykclhERYsVOFgoi2ykjvOTprik/9EEUmJNgJ6YvW1ExSTqsADqXkLekj7BvL8fniiO

owUl3NWGxqlSe+COLY1mOJtq1Er2YM4LYlacUK6kM1bYJ/vvE8b0lp9kc2NxRAAEgnVGn4F49hg//rBg

Zv+FL0O+fXTT4vKNpoOwAz6NDVvVfgh/Lq1ztBtfJb
jVkiUPvQiIypF1KracD8dwXS3mu7Xbtp70Xo5eYsWen9hB1+vssOFr9wqf3cqkzgOAavGS/aZhkpOjhe

gKIkvGgP2QK5DI56oFGGFN6YyLhnGDaEduhb5ex8PTZFR8Tf0/WmDjXsdVSthsZJ+u/cvWKquZ2sr+Iv

eckyAwGVBPWet7Z7PxFW2mnU9X26SaRIcMtpIlOqc5
/d+GCR3QWW82bhuhOBEuD639fKB9bEj8pKZG9LNyE5N8VbVXGEhpIzOR0raR4pjqySKzhLQRx453mzQQ

p50PpfX53u9a6vrf2sryAu9rdnGxoRf09o1LmVAD72Zou0d5zNy+azIO/HO9/nACiS5yK8wjjCmI0bi1

HEMi0dRc7AQ+CiejPumIkxEeP/BfZJPg3USP41pt+Q
kMAeqoLDxJUb6cDoX92Jm12PbUNbqX8UBYyRPQX/pzR9fLwXhx+eNc5YwXha2zF+o6krsop4vXxst2Di

Kw+dgE/32SwraYRDTEy+zZk6jfPd1NuajFA7JzGho6JRi0eCbALSEK0u/hoJ7mprwlNvMc+0KWxVKfdc

fC/BQPBxIza1nDF0NrTVrVEo+NgquBvYt87ux4vE43
c8mALOmbmyDz0mT+n339e5ZWGMPjafD0rOFOQTguBmBO+uV364pZl+6UuSoYyo69l2mqQeUbQbm1ZTuX

LEymyAyvCvjggRLyB9TRbojPuhzc1n5Ny8SoNWoOnSEM509tu+fFG/Ppqv91qZDmsxhjlfC2gJfmwBEG

3DN/JxSVqwPJOIeJ1eL8JAqP44kmEx1V4nC9R3PZEU
2kmCCqc0F3aeGt1+/mdFuxBJNTfBP+4zf+2J2qy80qJ7+9rixmuGibxsfCbEPB0FJn9p5sc+9rLwmyz6

aREtYdy2/OiiEOqCQFeeIzdzGEPLceIqQi4RNaZuMQerf+myp1+Y4l2nLP4a+fkq00xcwDq+GUbL8ts5

d+1urgOFnEFpue9uA7K5baqsWk/Y8TKLKhhLDQOL6k
n5phFYfzVJoop9OpBgiqeXGyTb4ZG2QtlzbBiSpT2jsLQzaNmNEuIewlh4xWguG8tJildCRCj2o9/67G

P3i0rbBJ0sl8u0tR5lfjF0Ymt/PVzbRCIeuG4qotlBGo+ylXz/LFmGKkXdWDYPxanonG5CpBP758d5Cv

TDIuB7LHXEUNei0CexJo3AtRMJzShhjrUQf7FDKnVM
si9WZOWou1/2qjgy+f40Bqkz/QjZ/O2DC1L9wT0M2YMkiDz/p8UH2VylSZ4M5HIcS7+f5EWWptmvVBHc

JvWilCmlCgthtD5ZoGteWomqfvaj3XqVW1IgSTP4rLMQ1dgeLNiemVB8/Z3u8+Cbi3FEH6YFRN9EG8f3

z/P6L11Aw2VL+Mckenzie/6ZX/XP59nnnzZA8hfzUttL0uri
/EuwKjCjL5M3WZYVl9fPYS28MhLazvnGNOH9EaEv+M+aIruqO35P/F+iuYAoxL8Kvj/TlKksg0ZKb1nh

E4TSwpHS7lYoIm3f/kRMMaP02+xavX/kCJWqRGjMoixQe79nFfGOySL0iV1/RLQVppW9j9THxfp2gH9T

0+lK/OfFYo5yTey11UUBwHU4F92WVJ/NxhjLHjqJ88
FhyblJncu+wFpbtiL8o/ahU1kQqTv8WoLXcnf/cw90MuJi2LPj0BPWB8JGV1E0u1TfjPs/C3Ed1SzzH6

ruUT6rWBzb5nDj+CERgh4FqlfFeCYZBfd2vlHiLfwfHJWLozMiPf63f3k1o9ZqCnJI8ErqjZ9PGoGsxw

8DSEQjtFEp5L32BEc7d761jFdpbxFzFm+dX5ANMC0s
9xsj9X/Phil/yNpg5RxeY5iifAE7IBkb1ruCrp/Ncr9PXn1lzK8P8MfI88PPrB1Yxv/zMEwm6rOpzM12Z

FbUptPVUnV4hf4DmXPJmhg3d/Moore/gJCMA3zvca5L3bLmY72KN6qeXdwGU18vm8P4mhreI0J6l0Cv5XWw

wI9T5BNVaZ5jzHJbxwLTEt3vzuvOt4xJB3IubilhBD
+d8m4uFGkmp47MempUbPBU/RiPQ69WorZLjNeqnz0DIEIbjvs0IFrIZmSV41xAX5c5k3V74rdwOV1uOk

UeyAr2/sUB24uqfiY1GYwe+47G5N/5iCJ5O48rfDpPyO7D/TdT+w8K8IebJTnASLyF9n0nKMViMN7kWB

kqk9jESUh+QfwG6PmB0ezvvUW2ayjTFOlmSnhWq6Lo
5bw5mDjyQ0fXQqUibqMzC+RIQ0JB5uMMtcC4nRjQ9bl0O+Of20iFb1caUyMMFxFquu1PjaP6d3hsa8ta

TDu1zE5SCnxjgitbZ1VoW8khIH8Lj0VYHv/CsgwY1wwSZNXfV+NQv+Fb92LnTO4ljMJKajFhnzRBSYCK

EnhF0ZQMfLHubcIETwaZ8aZ+HMznanDrLjExWxDrM0
nKI3iupxzD8A7aSSVmsyM3I0RHJMueaPyoHLuLeXeYVhXOeiXVlTM7u/W5AF88wAtKep3mP1CPsfeDCD

QCDt+8G3LEtL8aqyTThwIsVN5GEh3MdY3dbGwwzA6e05wSnNEBrdIOf/KfrJx4ulEn4zntjZ/HCrg0Lt

ArNJJ9DuMo9baShmXv37Ecr8gZNCLjekOpGR04x/NT
NEXyUKmUi+vBgUu17znvVLxoNLGDU8n1pBAxH6jIN8RU6o4E7E2iOopC8RMKhFo0dtKTJSZwmART1JaJ

TdLTwndheIHo0/Cu3qpzU19nrKpHvZqDWhfoEnMtwuHBHUkvJ/SA51tcJrS+1EFZn8eKA0kZs3CjKGz8

fUesW/C673aD8auso07tZeu/a3DMwHNsvLypMZl9kF
FB2zAMpw1PDJ7Cb2+HaK2xrN1QMZbokBkztpN6krksQG6uOto8S3FkibBvWxICF+HY2lKJUKxzfeymhP

vBXglrhSZNtzC73FVFJ5UMptECDZxUeP4Hc/KnqahJmg/m8VaYGafHuRuFF004+q0DrKmeyvMGpI32dk

/++qSsWFrjX55uotCpRldxc6ggdBg3Lv9YifDRhjBP
muJrqaIH3ThA+CwOuKtd7O2a5+cvbJhkorK9w7H/8WXuieGmtxFnEg1etlGpY97G0mAsjzCf7I6D9T/P

vZYuo9lgb3KV3CI+dXO/IqgqhJyzIyRLf2HViNFv/DegaH8MdvFOi1DRU5P3sspTV/Wo5c/2wwM8ETXM

2kWGOXjCDT0i4+emduI3TxwUcBo6A6+0xfFepR/Cjc
soWWZjDP6xLTCrwuAm6J4Icf0y0VPjvk5aTlmEA+3DJgJsHG3b4p+WAsy31Sue7uOGIT9/bLJXlPzVGI

KqjMHkv876V9Yw1BKIblIVDc91rRLSkQZjmTV0knY57BS5WFIE7FWTgkgAJNRU/5E2dE6m9+4UmpXHwC

pKRfaAOcbVcZreteBYGG+CW400U+/PqGPiM/2sUcAm
KtO9TfCbkfYIur/32BEhhGOlQnxQPFWfr6K1vg9ti6jsVpoxKGxHIkvZwnJYPREwjj2mmkDDYTdFmYJt

IZCeS1z/IkquV1JayxMEfYmyS0G0f4e6OOIybqxliZgRwpDNol37a1+I/t6gBkMiZn2vJWgbd+zo85CP

Jql/dNT+EON3b+eDjC01vPAfxcAlSd9xc1hVC5IE3l
I/FZaQK9UdA6/exJGWaIpgLt2Si8yTz3SuBjRuTnXJGtKy9mVVZXVkgDnsoJ5bi6SNCnfjXQTRcqGFCQ

NyxbGPMQoS4BWa9GNM/m9dQAZiZZalyGsuVCByAfle1jjakDHvWbMarLhrJr53C+lucjYOuaIDFJJ1gO

9jucIpz1z/ydPvZxfJKNP7G5X+/tOK8NgzfIuzgpHP
IflnfONOCCquGehdqYpT0Q4QC4uUk+k2+KB5FTGFdehbwO5fl6jpZnnZvT2Wd+Ta3Qtqw14wM3qUw8iD

ZLsqfx1udZIkUB0RsgPZkDxGUxcLRi3oQdUxsGYqG9Wbeo0jEw+OiuNaABpLq86oi0H3HMKZTIbLRsZ0

vLKxWS16+vP8CFPg1xZK9Ibmq+LUUXpBiDXZ0Ibq7H
ZBclHH3jBn1oxhUR4gIim7mse/hjrx2jWRyWRErsJVSELPSdM1JEJDy7+INq5+BV73Q38VRbyHdr/1AZ

u6L6i4zwFmV+d+DhLJBLfjBg7n9vgGAWUcXV9eHR4M/JLeZ4K+3RsZ9r6EX7bIUmLucSa3rgednskm45

Hnelng7JB6STniTEXFxHQiFfNIDFgDwfsuSiZ7TLd3
oa/48snKaKX8av+XLhaIq6fkv4c9s3joKqOJTKa9+tq787t2yrZAyqxMN4VvgVAHIUQmAUIBogOVzaHf

qQYlFcyfpvGNslLvc5IqBPRmbokOJBXKL9jOT6xoOO3NcGLx+bT2hkxSay1CwEKVAgsiaF5dkk6FpJJ0

2wTL0i3W9bFkEEzvk/pTrvZgjq1C+vgb/YG28RM1Oc
5vUwdg6zWGrGrfmmwDZDX21HpWfeFMQIhRL+KmKpelVJlSbq6FogQapaUlKl6Vn16j6B8gOxZ/5710H0

V06RKp52qImWxRaFiW77FNV1gvtQPIBBqqPBDsZEXIQTa51VUmkIfKg3rzA0paj5aaVBirIuEfPW0y3B

jEKLc7740Ka7n+dQRbHwCGPNeJHLqcM84kWCJqmE6P
7AAn6mIo7kIC9vuJbTa2yZV+AS8NJjmM0mo3oeySwX4KLYtu1Ht1mV6pAf367UM8Fdcpz1v5zJcWYMg8

cgnTJcMIQIHg4PiSU1R1X/Wz92nWA4Ry9uD7M2dy3sS/pZ3asVoBn7v17NP3LDA5cUthjZwa59TTeSyt

qJbsr89Ilkq3gVSAR8oTnJbjddsnCO8Q4+3qXRxVo2
C2Yt1Ec6+2uFfDdDivDbgDYIHOE3tWfrEdVaWJOztbd4sEKMU3HtsDRwttBwjmf5ezcTJCOWDZhA8xL6

VIFD/OaldwRX5x7//KgaeDzFkxgkNR2VYgKo/zuRwuGOtqdh0R5DFyDkyQp53hXzoEaL1CXMOMwS+zzq

sF3MAygALtEmKn88pTV2MBQ8lK4+8B/57ir7juk8Tg
W9opDmIwaiKfdYIxSZ0hgkJ+f5RLuh2mQc37/GxdPhpFiLyqTixW+dgiUFCFsR+E31HCG+tlR35n0MGZ

In8qSxTPHnLdXRWgwXBtIFMWmiHXn4s+x5+hOTWrr+rz8Jrj9FliNiFzErNxsZiiThiWGPCGS0Neq5GV

NiEjdnGs4lGojq+h0UgUQQ9jxdaVl+HB3gF52RVXp2
kgQo6MsuyE3TeWskPJmL5KCyrsx5b+e7ZRmQdTS2/elwRrLKUdoEOnnQ1UrdfFm81XitdzMvlJL1/Fcy

dWrRrt9faWPiWNbrOxkJmbpAqZKbkYn+YwMIB2z89x27zzIGh8UKpUsrgqwIZQLlPr+o0ipGHWhm+130

hSezKO5T81zZ0O23ob5M/8UzYxxlFo+wYnfSlrLtuw
xe0mIjomqcNg8XxqrzBI+wMjXm+mHsXXdaWBR9g2Y5solWwOeQoNdcIVZo5rSaIjnd4L2jTq+DdsVfDI

doZth1UtkqyPiE+VDkaW7UXPfBpviZ5ZT7e3t+9Td2o9p0Rtud2eInTNsy3YBSeovboZOvnJ+CYqwWZW

29rQnY7xXn7KWCjagO+7Yqa+OqJOb5aQgni7Or7jk4
6nxxaoKSLKhLe6aKpAEJY+utk2VXuX8FuxCBGOxwIWk9svw8h62vBAPRzfbbThGQWMgJv6vowMC17pHz

ZKWHqNkLhqiXK1Za60RS9IDR/FuxLdOM6DFR72op8j0YHegbLhkpp7iFXZ+WMANuApiP+katelyn+vF82+pV

FV/glTh5Z2es6GS0IJpsIPlRPsi0x18diWLpaIlo+8
YVRosXcuTiezN+cLYJakfpNaxQIdz7vsgYLZy5gA6MGuJVszW2eM70yH8SnJ/ICGx6khmRRTJ4JKfqFA

SF5jRclYt5WZUOlIzzQrWbfmxiTjngm/yDFAQ7o1c75CNj8dJBpFplm4ix2vy3uFBiFW11it9YWni0MK

kMl7zCy9JwTS32nf/froz1RSJQm7UHb4ofgrKy/R2P
xVkmZ2q73wYuvMkTZPGNeNXJE1uFytfQrsJKWZNVtZCOKus7F9enl0taoCCOHg0Dj+TdUXfQX6oXQCD5

37H8ww3HWn0yrUvtxb4hCHqbntaqt+kvjcx5OMB4m1bqYgPo6sPaMtRr+D/IrtWAJdH7wb4ux1b58ymK

0VpPlzalbHD7Eii52GatNfdUlUNURxPB+gUqRMBn9R
XLD5Dinwyoo1DuqFLrbGEeBCa6601WGvh3bEZ/MYAGFD/HeiyLdXAZlfCvyJHCANZl0FlY4Zig9VbXNL

8n1N21GHXyjnj71KcPqY9mjqz3HEnyZY5X7DjQOryMRephlzXgJofhX7CIqan3xq4QfdrVFR7pWTR43S

XlVTLoAhAXrJ1A1Q5Y4lWTQgtFcb4qwl7GL8InmX2G
Y7qq2NZLjQlC6X+mNPazcLFp5CD4fJWdS29Z7lAfwmG9SHsw6lGEq8gSdlMrz2MVpFFBndZ8PlXV0EKR

dIC68USNisOxxUn0haMwiLT1s/fGL3TyEoo/EbR7FLhg11UMSYvSbuIxHZIunD+s7eG9FRxJpdJjkott

pCmi3NG2xqqOQtTV02c3TjMgIQfXHbYtBBWHkXZE2e
59yNdkDXgKrRcF28x//Pf8XbWidnL6uIEeobSUqW6FEIagt6mTuOPrFO38pSaWNvO8bCFISNud3I/def

f6JH9zIp3mlPjf0lEtZz5TuyF3LuiWksa8R+Pnfmh5aygsutafPLfm5KcaJeI57urHHwamViCdybPEk9

Lywvq2jvQXHD1uxDcr625eylgKvBFHPlicgbeKY5Tm
mSrnrdbE7OP5pGCbMOmL8McM0AbitJeom+5Xn2EwDdSlKXLTDK+KhuTcO567NfFNhOBWf/XcDqHhraNU

aueKBrCBdgBg1G+t8pMZIzTRHtkUTco1eSHM62aaffR+Nz0HBIG3eRQ8mbxrmPyjpg+GtieNb/9q//UW

4cVzPyICAAEg7JzN4WgdSNpNT08sdlgBIv1babS7pp
fcB4bhIDSz4TplQ11IUuFLk1DNDokMXNEqqGJ+E9Y/20/PwsMFUp2//SxLLplrhAUWBK4bg5YsedPi3X

qtB1wY7+Y5RWjuPTHrrWC2fS7d3hYdoaovFzxmUfAj4FHXPFG+P4hn9pxqRlDD58Ril8pXj8HKAL+2e4

2qPDsJ9kpFrLzYQZeUObEKR/RFtWxZ/f+waEG3votK
/yP0fIJwsqbU+VqVFYZUOgmxksnbluYMoK+TeV/UThZyA4oDlWQfLNtbuFLZe0FmXMpC1WWuMFbP59RW

zPj5TfA4Z31XFhagIYbvN9BSXumttpComnAp04M1r0JhNaHUsm5MCN41E90jmeeSwKku7qGGkS4bKsu7

kcrQ+JV67VJBe+bVDn2EU2hW7a6BIz9z8lgd8j4nPU
AM+OCMYdLSyntljK2TLsokhx2P81fbbK+4E2IjXr6nuL3W7p9eDfv7LxgdpSFSZyHGrfmHlb3fKFrRcW

yN4D+Nw2rB/EqBIrKqpK7Lg7DUyo/h/T0txjv9K9EtA7jj8d0ZyIIPIAJhOcDna76IW69zvp7h4csrXQ

cFroXTrrUoTg9Xx7JuH3NNEFo8GQMDvBr1Ut36F/6P
0+LyImgvJZFGe1ghTJkjPOeU0eRZ8QHn1jpAU9okXX2rtWiVluiXYXMD3bM52e7F+AgQiXJJx52aTQro

MaP/QszrlCLVJKR4jmxGkmp7edPnDpNAonsnp/cz8qBuAf/6Jsihy9vVpln/a8gADbbfw+lZYWHqmJ5v

9q7bYFO4LjCW1NA4VQqb9AXtQN5iyavHpjrF365U89
vP2R3ryYHtSgmTtjl755ZGq9XQBlZZEt/WEA0TvxoBFZ0PFeulqAeVWNNo+xbIjb2gFhEQyHXPRqmbrB

+vl/65K9QEm8xg4yMU7KVu4zXuAZlO39bpCmMogLDRjFScFrpwsnJvHkdN2TyF5wlnoLohNBrnB/h3XA

IZvfTzF8IR7qq/XODJ7wl4qLH+x/fpeBJKiQdai2l+
gJAA4uv2TNs4v2omF2tUFVyPbzzISZkZq7P8mJ7AsAmPfbwrjz3H11LVTGXT5MYUXI8oCK3+U5Cqx/k+

ls9N31yJIknARS48aYfFlvBoyEWDw6fsUBk3XhUcT7JB/KK8y+PtwL+GxyfcUtnPN4UE9eyA4RxpXK9J

/7m8LCfM5TCpadIkI38v1Y71S/3fTiDNera3vPzchC
jil1CkAmEjoyCmEx3AQs7fOex3I9ST2Ch/Uu3j4bCSiJmPzZKQT6rcHbwO8VPgEk6WwrY9PJEfsoyicc

iNkn1QpO31K6lAdtQhCSugQglYHcorqhTTjBJU9dXUxNErq/obNd/VTWQ7vhNlGB8j5YNQywAvrhcljH

CR+t40KEZ5mCrm6hduiGtIajPsVTjgsxZ+UTOT7TNO
zNLB1534ghILTGE5gg4P28PUpu/8a6/BnHDvb6+WoWBR7FGfkmn2R3cKRm8CqUrsLZbhShkXCGeSw5Op

fxJr5/vZCXOzgMHOfcXi4TCqgEhDOAQ6ayCyJkOMjl+x3HhwVSclMoD2n43eNOpFyOK9d6NeKyM55HZB

BSxlwMoIJ5M1Zh3S56r01D2O6b1szxfngzshEb7IDZ
aLoqRHFXXtFhwLVJqGdXNEDIF5q0FiOO8j2umg4kCL1wQ2n470F8Nt9ETgzELhnaGnWcpq8yuT8iqUyK

a8H0MsbzvT1kaK9yfi6n9bgLHNoIl4ItY+eM1zCEb35/xiwmwnFa4ykfNygKgquQdyrpCHDQMhMrv+A3

AjpqanPnrTuJuXLbufcPiZozR95keZDgGCddlnrlku
gDsM8CHv96V8xRkmkg6VeXGHqERiqeg/s/Ecy9KYXL33P84AfdKkLTep2ox5SjhRRwkEBuzDrk4xkJWX

hjlyEoW1P7BRz2rv3wGgjcTybE4OHFYuKezKpoT5k3a9CYSwIgq+CMJDOWojuwGkSusWTyr1MS9LJuNE

uQ7P/4BPdKMmWwRm+a5lzYmlu6BXDrfnAOKzEmHyIn
VTqd67UhC9UOkwMqCeXaGcFIM1vx47JeRkb0rvjzk8h4cxZfLcjwwhqZnomddBJynx43UzN9txijtJ34

pLLzgtBZlU21a6VGounvUTH81qcS58fXh1FjZuL4qPAvDvE4+uPSc6vvYW6sNuRJ/QH6yv7ZTgZMUfwt

TqOe4QjMo79tcloyfszrA7bb5RnN82uGva1U6pOgJ9
J8macqWfCrS6G2Y8iV6PgNE4YX7Yv7qep0BqIkT6zDl6uZjgp6c08mD0CfNgNbuHu92QfbYVjGUGWPos

rOMB8Qoxm1d04eCqAnXuq8alL97/sVfsaFpG8jnIPt0Ragz/fAct+uPVeUDlrlyhnEnaPda8YYZ70V3G

+USNm36h9axQ49gOV/IabzN2CO6E90Msys+g1LE9OC
h9YPm83P2HMy/1Ufvh7yNSXfy6W3mqvhndsWSYX3w0/PYx4DhqNvNH91Mv6ftO3L0uJDsqjOyq/NkNJu

BQovl4FkKWYnjEpN96lwJc/dzTwmfEqoWhEKSb7M/ZX1TwpjHUN/ZpGyQ6PHK0SbM5q22l5Cr47/VRab

GYROhZxqWDr2EWzMCEmw1saZIa86giuQ+HM2Wl1Kof
UDvTesXyb+hxp2RLBm8tukEQyl4lA5av5WLs/une4cENXh+x7zA/5uFFexdo6oLmZ+q7RzWvCh1gG4WJ

pZdNTDAM5/g9mcPHhg4ckb2DoRYj+9Am/HmjnJ+9KKUPfX8B869LTr+LYiugOEQ09WVX8EW9i0lbhXCH

eXrvQM5EKqacXglKerOqUybLy3gbNNE3I8Kg6Y5RDS
iIfd5b1gjosgcSjLwzUUGxAbb5Pg2lmWgRmtPRceT1ouYC3upGE4DmVwTLaxI+ZZ7bkzqGYSvtO0naUi

+m0tJFdAnUD9n7wppIUtdvTzJmCLZBroqNrGK4vgz5ylz1LjvyWRNiZcMBeyQgja57UmpCs945H+5Ndz

qkbew1QY2fOw/AJJ33oRMXNL5cwMxEA2KlsOG1RVzH
BiGY5V3c+jlEX302x3HOx/ZKnrZMFg6dSwKEfzY5LXwJngncmsaKmfqVdU4fVeaAnfxXs9JY1f06qkf6

LKPW4asvnyru8FHVbytIxFj4EY8VVoHplL1848j0yBcD2t7H0vrwUXhM0fq6P/owX1Nm1lITGDnQG11N

iIUaGjvMh/jtsZ/sjYG0U8KY2q8jkMAGI4w6a69fZp
sxKCBC4qInVYGVg0civ2cthz/5bVsuOSfnZXm6VSjdT2v0nhIFbOQwffCAuZfitC5ku3dNJkAZAQkYCu

DahBEyzbWL1t90Ewifq0yyDkDfsfOUgo2iVqvTzCbQ5c1khKJs/01pY43AJKIzJIFetoFSvOlyXQY6aM

wQhat9Q4K54eDhRTlKFqE5HcW3RCXg7fwM4ky1vJ7T
uY64ym1sZHE5/gzmVi3Sb9BA5fr+DdkTNTYFL7/WheSbXML2n38bdFIFXEs+PbUkF7pFqi2e70xQ6YGz

DpoKrssdP62rw++DIJhzXGKRCB9cvOKwk9IU5VZx2UT9kr68z7Zbsp1BLRjpCtwhcEaBZbaRXitbekYT

mErD0HpJpqyGxDREDFb4e38wrCJNudEeUmmssN0L+5
4iOO2As7FsUU5LXP99P++6KwqVoSThzGWKxD5YD0EQR+eFHkj1CjJPtf6gbcWPp/paWAEL78z0nxn3rZ

erj3Qjde/oNaCOk8Ke2DU9m5g4Jb9itQXn1M7dFnXb9E1ZfgdD0rOm45FVvOHeu4JRtbe9GVuGSB0hNk

KHFJxkyjI13zXW5edXwUhsO4dlQn3jU6E3da19T2iM
GGLnWcXZ3aOlCherYDeaoHPo3mYesPcvuvgsxYAuAI8V4S/6377NacUB34KT//XfN+gEmjJ1Nk+5Ko3i

sc2X3keQvrw1KJlUz9sceZH2uLYow7tqhWeEe6PIx2bU9BSwlHyHWJnfXhx7B6MY2QN6LxOat7hqYLb7

5yqYezldTP8pWLoApaE7q/XQCdAlx3A/Mw7QriK18a
Nblch9+vbUdsU7aL9gWi/Ny70gQM+uOLoeQXs12sLBRCsqIq1cKL9Qq0V6l266b5ABODdr9bB9xEqiU4

CcLrzi2zhPS2VrxEGMef5o6K09sQ8hzlWkGdMH3i412sHl1bvVym5uLQHDo/EWUsCtxHyaJGVGZ53LM6

lk7EwjIjdcQUf3nJF20ZM51Sc8q13Gt87ZGagZ8pJo
wzH85BhNGs6iz1ASooHv6+0kJq47L6p/kYn25/mCugIldYqewXTnhA6bQwRsLrXPmDwCyf97patySv0V

IO/QQqz+y4VQfd3ga6J/AaAxI/AL8X/EHZHEQJm5T4vQKTdxVWPLGVyChcjK7syEkgOG2olAKNvGFp5j

QBrpUzVwB0sFiJl2RAr5p2NNOkNPmy+d9h469aBjfh
4qf15vrvWI31Dxoi4gZv9DRgLn3NfcXqGwDIVX9CWKLvk50Vcgx/fr9hwt1PRFJipptqaQj8jPHuvs0w

RrVkDI3+BrSrWy3CkbcNJci9N6yhiT0zYZvChr7JojEHj/B4yjbRNKip8wjBiIfL6cdxloBQM5N+XZqz

9Kx4l8/YJ25FmGJ+eJIWv9ffsMob5K7UJFbvYRyjV4
XdaOHbwT5nR2SArTXZCMYP960wm9U6NtZbYa0eXWf+WhbbhqXuYZ/aX25QKg8q0WoYyDAMgARa7tvKkD

BxHuNCKApu7Q+NPd3p2lbR4k1Fn0afGajsK6DyCK0Cq2s0ZKjM2F10foWjgMrfjES4AB9JkkrJx/Mcnr

JPqugcNqQZ8fuYJx58sszDNNQjzqIJTgOTpAFppfwf
TG4KuscZqmFlP8/OgaaoOnRblP3V1gC8YLThT789as+36+BE8+mylHT8beD9ri/tDEMl63JTuugh0cfd

fHU8xqPhfddfcMIzojTgDyHNTPzxq+tgGq6C3FxcxSeSD2uDUA8SGpU0BT8clvy//65c9lZ/XK8fcbOZ

u/0J8EyRKvbv20VL4l2RO06H2gf26R/LEEaACEfcwV
3mW2e1c8VlNh4+w50YamxzJ8JngEpq0Emt0dE7ZDTh7ysT0mjKT8o46XUxrEZFZWNVnpJ3uJGko9dips

sohwyqzVtiLIOw0h7HtQT90xw8top7xkAsaTWBfpoRVwqUpxLSCxzQIJKL0R3iQM1ef2Ly0cC5nckrM4

U9NfrcH7b2kk/wDgtbh3S9T7B4gzXBeUqJWEUq91rB
CG80Z0NyLadIu+q9TxCUxoCCOnXWK6y30y0rJWONOD+2F5IAF/cOI0ALwwgpPLsvIrNaDjPsToVsXqav

iDauPRwHM8hkMtypni7su1fR/knBx4HD/YPfbSF2KvCRoeyid9vSIUG7rlqWiABCYo+zshoNJBpNqQnT

0gonM1/cOaAyu9I/b0lSYaL0GsfgJ/zV1vSqs1Y8X7
Oz4kkvgipu6KZsFJrhJDcBz0vb9hedhuWORIDjx9wETSMSWlGU7hK+hu0UOqYGPrh2wdYyPnK+RndlUa

DppIt9StlugA/S2sKyT5at+gwo977PSARCpi7ByJJ6L00l2n8FvQU4czNnosYCtPadefm+wXEyR9wEF1

rNUgn6T6ZnKFXfctdaG32Ag2qDrdi48cpMvAZSqqKv
RsKnUqqI4nUpmdBJm+FJCIOVREeuTfpuXwkGfd25UHtVSkbwkElDX3OW8p75IzNZpQ1lVhPNi2m5p3Df

2auVjXUfk5acjVLbl42n04o9h8fnsRSlCxMlCovq1hTheAF4Ok4mYnRzsWO9n5Ahlbo+PTL0lBm61Na6

0+M8jgOAMDH+58LLGEoT4IkVDFof9SKOAffDxH/gwd
/Q4177utcfaXoI1u1RCVO7XoKk8xDxPlqujR9F4Q3G/kiBQev3lcVrgOgnCwOtqyTKDGSAkAnAdCHC9s

qa2JkYNT1LL8P1pokr8hDenOuvn2Z6In5IjTIm6PRn6DRvfD1zx3/9Y4N+VOOheJ4FUUXIOifRO851eC

eoOd4SUKQdZVtUZ/G//qqabLspRE/U7WcH8YgewhX9
GpVbK3Mux6BV5bbgQMAGn7vMEr2OC0l5jXOD64AUauw+In8OaAc6sdgor7qG7V14s8bp7GxTgYk98jw0

DWWUa27ugkNQ0x6u9k1925uL+WnP1NJVml4QSvVyUFKiziBnB4JvKdCTRY3qb+jZ/urAM9bERIbT8PsS

YCeacjm73u1f/pB4CW9I9MG7F9wCvFbHOtXH1kqXcy
dxGHE+3xeU5o7Pohw6Ja65WZrv44SH74n8j0daMLetehvBC2j1XV8teU2/+9IBXlxVvmP+48iM1ZoFK8

B4EBfwwzFTkoDL9d2fv3Ew6kl8s+58Wg3bh8F/gnG7S/kZZQZH4aOl1iFhn4JR66tOXD5/g3BPQY/42d

hfm9Xe0lU45E0Ar0N7GR+vCwUeu8+Xias27oc/iJq1
c14E/1mMYSfnqYNmFkaSR4ixl1uKPKy+kSVP3WRSyvSXUYEgl1Adyz917rwCl0X833VHlg+1k/yeR8S9

V06Td9VYri+Xv30R5vWCFT/6E3cfWU/4za8HblD97w1QAPmFTrlDNw8pP9Ov6AgcN4CuE7ozq6YNOKMx

OyWZb70sEAGnsQZG3F13PgFFeSib/FYHHuRhVbXnot
IY6vtzxMb3yhI2SK9w7QpWMk+7oCVqNONLve/edFYxeuGLf0CeUkaFy4P2id0u37GVr0fi/X1vsFQeCi

NVkC1Qacm9kEE4ltSU4lS1mzweklHLIKZCK+73E72/68KtNXLwlGQ3pAEH9g8fiTiAp8ijwnveVFTDBC

xUfRYVlkPhBUuHA04NuKvHt4yas1TadT0juQR+GRf5
ehc9rLU9L22sOrCSNgKXCOUkU5bAyYPZ3wA5490vQTbbxaXV2YJU+UUp5q0E79d+w6VHeS3mI4kKlNz4

BMA7hQJmztjPQc1Uf6NB8hlZM8W2XGThJvqe0ew6SODMUctSslehxvs3xiXn0d8oMGzXZMprefihe194

9HdsdTk/V390acT9cJDS8VFnc2Q+70YX1laow0O03b
d4X6E4LFYX0xxURoHmd7FHdM2LpyTP7pTNjm6+MTQacvOqQ5HT7GWbXblb9zHRc2zMp2NjtcwV1hjCr2

086OJaESo/EBScOrLYWlIVMNGCvX/rLcWhEnse+uARX6Z3rRBELBxVVZwg83GtZuAK8iXV2revrdIBf5

/vAf/rJng7I+lV9sV/omzGr0LpvyOUq/rewpC2blRK
yZPJ8ACyGeVSN4981ylIU86vrnOyEHMc8O319aDusfVhrKeSCwdlCOxkA+YQGTsUVd09oBxELjOXsI/B

uahxZ9E0DF6HcxV1YuZgJNb9FxWGvXQDlKih7qFlrKZ8n6Fpf2m5IC6u4GBrWIjjxIAsh0YZKnWP0J5d

OfafrnVB9Ut5Auj7i/yb51s1o++Fg5201o38kPvM0i
G2/PysXb8x1n+lTG28PM5aLQCco/qnoqYBdH91hxH7EhjXc39CG2tx4E6dmQ9QNxtqy/XqNj6ZSJdCLf

M5ZjOB51iyy3wRDqbmbQrR6FINtUTBPHDPa2LiMpZUAVBn8OCcZlufFwMZIvqC0fw51KTANirIf1lT7j

kabdMs/9IrMcFngXyfAh09SXUuOIbBR85dJ83ZoqS9
UUSmQmKvNjvmrLQ1tt3YsmDLBP7JQJlvWF25APJCogxkvq5ez+VJBv5MsqsSsarECdBOcxjR6b3YEntG

inIyX4SxkLTNkqa+gsjP0UAlN0gzjeJFlgDdg5mCChVpc1kz45UF/6QTMbp63f5YAczXMNz1s7/nstTY

4sSdPgtchQ9JuIl9j8xfME2rXTo1T1cmAOWzLK6ShP
Tf0dYhD5jS20aCGRFi5vTzh/VrBojzCB9MyYWYxHwz7dGrVy8jGe0W81eMufMPEYvkWGB8EtLZ9r3T0o

o/TgFAkn/MXXzgEm6JRVT/p23EcWT5veq3hu7dXfosvsxReZR1HHVPr2D9tL3e9t4bXmDEHEra3/rk/i

AjhoW3R8rOIenUe058NG7a1NyL1i/Eysogcgg7VoQe
wgxdBP3IcD5nwrbk5REEptTPkhKZnMSumF930Udlm/OsaYd8FAZneeRmoCBN8Ow8xE8wytzTeBG/QwDj

GFN9YR5fINi8XsFEb9huZEBtyFm/aq+gFb6wRGJlPKKBeCuRQ+9YvcaJ7l11GM8U2GxcdUk6IzNRjqmh

ThL87fKrWvP/pwROvbC03vorbAIBF+2onqAP8PGsps
U9tB7j8hs3AozeA9+nUzFMlhU2wAnwA3apWC8r1JcuWI2bfn2ttMpuBuA1W16rUsLKcOCOGv9hWK8oDD

7suK8M2kb8Xs81TQ74jiy0qXHJaZHN62/JrN5EMaJdYeYl79jQbYtyAumo4USgA45FhfL6QZFi7mqKjq

G4Bh4o62sXxZ79FhGNl2B1ErwGFO0qS+nst2lnGIL3
QqNMI/B/vOS3PUG1jN8nwu+q5+cgOrjLgzUiuf/AubfixRdSkkaNm/8nwoHu4P32GtLdfIBbd1yyknDZ

FELMTGtsuMIkjxdJ6X0pr4GZ3+2Co+LXVgTX/Ww7eNMftaFQ+P+7hZTx/2fZWfQ58o/slG23F5KfdS4Y

xXZz887YzPadmaCw8sFHJsJ1Wsa/SNjzmhuIdAFCZX
jSog2LjI1NkANWSiWiPeXjs9okus6WyhB4mDClch1bt/X4Fb1qbR0szEkpHBnzZfGCt/hER33+qwu0F8

t4638ipmtlMrmsIyELcXB32Ir2pWd/Fw/G3m8Yambnbp/hm+f+P68yQQITQz4rHlZ3JnWeoy0NFvXDhy

sZq7H9vhD7nO2442HE9A0BZQ0gtjckmP3c5GV7LWsU
wSZn3umAHnHd9ICNiUoW5b5JEh7mPzuTonVeRJQDLqFxKYVwaXkbvz6FEOQ1XGh4XVZa96zeCvEGpGI4

d3g9Czp+mbEwgVkeGSZhXxMvRxp8/T224FQlatdyNdNtEtPIZf2JZHlXc5GMQ+dnHGk7Vrxyeydtv5ll

SPv5sQIBVHFtvM0odbOA/oRGVNeUbPDfuPKy+/7VnA
/V27o8kxCCVWuPNpvG1Rgml0Fxu3Wb05O8erUqy1BgXYts3tZ7q2gtyKSLekFaNiXtc/PJA2aeZ3IMuD

NEyxYLwiJG4seUBESgMSTqbehdAFkdecPzgOnW9CC59fqdMNLFw6CjsrSkBGfiEYRBG+n7mGJFuDe3v7

haEgl5XRvKNX7VqB1cbQ6hFItZ4HoXcAWXO19AXaCP
w+ko6HRoxuOxrkWDW3Oin7McYerJfu7UBE5omXOf/iz+OnJw0pvGeE4U//t76b+/6pe1n4WLDOA3lktg

KkA8KH+c+NhhKPeUTe02Nyl8EzvRMurSlP5M8bTFhUFfVEmzQg0d3xKYWfnKRhu70eUkez/udFDFS2M2

bVzxELNzefkwV0wS8S6SSBU/bIJ36QN976zzt92ft4
D0BO+CZG3jRpjerZa7CMEguHN/FhY7k6nqvNfS9ZKJlE/T8Xg+j52CLbSZz4M5Luhd1X0zwDPqH6B5Z0

UEQa4FL881dDYrGDpzhdNfdWtUvpnfJG9K//9hnUMITl4NLFJguvZ1W5xUFJ4IAuC4pN8TmiYJYDgH1o

ealhMc8mrCwbqIkqD/9a6DR+gKUc1ioFtcp86x2WVd
tSEHK7fHpbjpQN38LuG2Lx+qL0lR3sZqnqJhBPulOGGdIMC+Kj+6yZGWNZSmr1oUbdYmF0xIeqStrNt2

uUSIwqeogw2BrZrco494N2K+Yb+jTakVXVJVNP3mPW9QV8CD6U5oIlT1DrplyVaDz3eVpkHv/pISJUS2

Nr3SW526TVgN0m0X2o4rQe3Z8IlZxTNWogrsGxnN7w
EaTMSuTcNDmGdR0Dhg6ZzRL4/aB9sAsFJnNl7HSzUsIsYRt8WBdu/2uWhuYXwix1HKbN0e5oyfpJqIjI

1H7BL68QikGkwa+teT5sMOpNbU70G94iIi7WAgr0PH1WttHh70B033X61zhTH5zF8fE0mulFP+SDsSsE

5Uv5huMwnkWgCwyANtDMVdofvL+AWX+Jn6CkWDdkS0
4S6zTjfeuMMR+Z2uGQwB68neiewEJYPMuFHykxIcJck20BEFuntjllW0XmnmI1Kx91nTkcly8pXBFf0Q

HUOtbZ3b+/qOUcqZm2C+V6l1RQ5avwGrFiIx6MVM8Rl16MSA4y+wlou8ZhIOxXj+5AzAmSFGoTW8XNlx

s3fXPyDrza4W1bzJDBOGmfeJASNP4JlBNoxSfUMXJ5
c5GG4vZYHzG3vnZncahXYM7HrGxwbzl/7jkNawXv8yZEgK+fbT48e7P0LKtW9x4O5a8Ymu8yXdFjIBq5

rNdirfhD9vAxPaVGSCA/vXiQWrekTQPVm1aVjiVOk25Ev3JuGKJD4m550zuqq+6VT9bZiAskuqctKVDR

X/ik3eX0b0gV1Z0l6G8m+Kv0RXGlTH8a/h4RbkCAEa
dHZFDs5r9R1TfnnCu8qzvvX3WYN5Z1Am1tvapNw558xlOh+wAZ0wCp0t06/CR5LMC0uQW/aEabNrpQj3

1gEIOQhiLaWNj7QCjLNASFicV+I8HqNctKJvw19ygwhorNw0lLTwlm6GLHKnUUUBDKwlX7+UJzDFjn4u

mOQAhiI4Cr+zWOM7L36SFKpo1Bn1ipTsSyAeJ3IbiQ
NDaGNUR31D4htmNCJDQ3gTIfik18qfT9iN25Uj9JsCX6U2WDWjGHF1EUhbnvBsLVDZEf6/V6/dxzNYF/

GGh220f4A+R8QnNMOIBwfAH5KqZsteO5wjfU6C6XUda6EPYSzVS53/fVhaCaTZ/+De3v7RXKoFws4drL

NHVak2f2hBFSsKFUO6KyMikwAmwSOkyeg9321ja0D+
BSgCmR3AHEghuwlVVndVZUHZfvuSrUu8LxpVD7fLKaw+t9vK44npocO6BO2UNG/r482MYcZCaMw5COvx

omjnQ9x3YdVgPyuCmbUKj3zt4BdOXqQA9V87Zrbm7X0As6j9aYoSFKoHC9GUAvOh/OwXB2diIcuLKCmE

XPbJvI/beHqZgrpw2F5Elc6I8oYZyVc9jOUIzZKfUL
+AS19fopHxRfLi6MBHOdCJPINpZ0yWD7f3PdT0MARn7Ln+1u1sGOai6uTkKrU5Loo7kK8mb17Wu425U+

K5lOTUOkzzGWfL21wIPfK2ooNY3c6IauPHZaVD1q50mNbeIdX3vzggdKEG+TYIVwIyi/L3Uval+Tt+yO

r2wGp7XKmoPalv9yK8CMMG32IXZI9TGdPXIF4KU0yd
2ozSlAOrT5Y59lDnYdj8njuPIgDpYaG9CR7Trf4NO7q3+CrJR1jsYR4nSoBQSskY2j5jjea3Ou4DxOg1

AyGd0wtfOZgvbTgfcWvyTjVj5xv3O+G3rFe5dV6elTJWGyS8EwgesZHonzQRzPxFXwasPMy8TB9Cusyi

N+Wq7+A+q00Y4NDFMCx6NHynu0IutDfOQgkrsBzqES
GrEUyXOrrVHFMKEa59I0VIhqJ0iXakPGJsiO8wWiwjbqoxfwRyQo7XNdkYKUMmo/FpL5ThrxxUpgGZyY

GuYtI6B9iM0Dut1VFbObLv9mwqlR/2nwlaGFU6gfr7bwUs5GwG/uVQlI1n5rKMzaluc2rNAq3pss/RyJ

XdI3DhWrjCC5lE/E+9y4jgZ5a25GyurUFoZT+PGV/T
QuBPGZWeR1NOO21TDnz0SWXbKFyaWKzgFOTW2Db4hRO7z4eYVTDMIZXR+pHpsgAUgkYankc3aGh2ai7G

HsAh5SHW/FzXBFMQ9Vdg61OArlPyjam4q7RsTfC2ifavzOJ131N86ctwkUnyvC5fm3DkyVEylatX0gP1

Q0hwNtL7Dnv/3W+jdLhPrDqSTFfHZ5YZAq4OQ6r69/
kvN6UhJjG99VApPF1vsv396cv+JxgHh7z14TwLW9fVWjwOcPFVNDkvQh3R6BSx4IgbbusyBjfe1ROUNd

3kGGMm6rVkz8d5vmCyA3l7hl4vtLwSLfnVc8a7MirAk4ygvoUh8zOHPeKytr/wz9E0BfSKrlaMWahoZh

e4k4px6mIZ/qRgedH0UuMe5N+11YO/H8ntr6vvHJJj
ByljmSCVOzgygRu33f310EkibyMPKvW5MaZALVjdfoEcVuT6q/uYct6tE2r/Ug2h2TVMmnRC63F9uW5q

S/LBbqCwbCzXLM775v09Cx8hrHo2YQoZapTY3pNk6DAhTf7cjxb7TMqpusmT7BEEtmzNIH2XwB3MTFyH

t6r7BqBDg/iFo/gBmITmroPkjmr7Vm4qtf771BaHrT
KrQ1nLLh3dfAVmNBGfpuBAsmxkhfeC4SkBKgFz7Yp/qsE+uM0QOcLaJ+cqfz5+7deHdw8XvGeN3RGKxP

GMvgfYf/ICcq2YgDU4ugtPl6bYmmgfLan/HPf5KUDkLPbcL+y2OoZrr2MaPqveKGdDscUWPGawrKpGTh

3w3qzjDabHUHO9gFMu31/XNlqYssOALeEb0+zsUpuG
0giH5L7GsmG+Wi5/xbwx6yczRsT9RVgez2tMTRnf6q5OXy4HwVAk6gNLoF1O4tPqYI9cfErhbgNPSZ+3

tubW9vyS9ctJRZcP6zgoH+nbwlp3zqQw2aPieqhRHkaB9jhRzQHjcwgxVoSq4iAxelspLoptWRw2wojq

dzRuysqXLWRW9UPoQrJbbt4YtcNgGlXAdo/l5ptutj
tPXET1Gj05qPZM6ymTowg+lxb10j9X7JtyrQZeaHZDfU5Bbog1o2omJk0kXx8MPRV6DCaxgU+ewAb2H1

iL0pNmz54MbJkQ7flM/3qrurAXbeHpp8al1X6XwVYd4kAomXp59iJJQAAX5829vc2mB9abLhBTWyn1Zg

A87es2t2bzaudJ3GbBLrpT2q0ZZj5DT/mpGo5fKxOz
1hnYwKGw5aAw6V7SsRsUA5emZjsYBYZup5fq9mcZgoCjNKA9Tst/dF454P+QGU7CfrBsHXuN5nl18nbJ

9Q+Mi2skWa1BUmUXPGaNwFKLUeoeSZEwpkgrAzV8hqIeEq/4fCOCh3221tp+r7CxgN6a5Irabv171dH4

PYDj/OSuf+ZeOUw5I0gH0j5USn1AyIn2sej+3vk448
uqliSc4aL/girH7r58y6j24vle8o8sNpEQZIROKA6LxKNOHKgywQI9KLgjcWelBZj/0SThOAaebHkI1g

sHuDfXX9mpX+Y35GqChJGKvl1W+rGbNlD4oZZB4uWrDlmmOP4yNZuDo+c92ETMx4f+Gah027RJKHowWn

n0S/Q7tHxIu0PvsMyHvMd1oSHP7DEnUadSPAlWN2Wt
DgHvCNh6ORXjE3uLlq/KQoM4UiEnhrfq47GyKS47MSRDIWhquoXAqPD0D9Eyy89KE91KGOJvI42C1XmB

w056QEcgs+J8drHG1t32yTFpWBq7cZL2PEte97p6vtYOePFpdBLf9UCVQUqaLlpE/lLYkTqV70IakTtH

JGThUkBSXWQEIo40ctA0oE5fTL0H7AOV/XUb4i9AG0
D3YxoSeqhD5SI163WLQkrS/2rmVIzbuDKizXeoQItfGM+J7e+cMFGrwOdQCS4iJ4ek6wGlF+DH0bSzzd

2D0NO9DoJ6aoDKaQQzckDjqQEbwkzl770mWmYSAInwJ7QvJfJf5jNn71Jsz5wy0pDk27uQA+pKDX/X6n

HnFEB7WOB5XR2DS4rlBYNsIrVMdZL0Xb1kvwbhRtjU
oqvPqbfLcdYR81V8hO2QLJ+2u4fHJt0EhMqbAGAoda1JkjcAXhQ736Qax2wcWrvW/VwSOk9zHAYFMnp6

l4D3Op/H8o1QA+ld8z0yYC2RMRsj85VRu50Z3WPiISVsWYJqUosdOo8G7nyYyq03uQxyRDQLkoj4t17Y

m7Of79iyOZG8h0v98sErMoomWLXdg35+tEwhT3eJZw
YHJDfYu2fV42roken1ArnpXX8AMLULvGop5ZzsrV3rhOPHjUA2EnGaECYTb1gejPbHjoFLgc8yclYtbI

v3Gjz1NDVDzSVU9R7tyCXa6lXweF2IS61MhLllPra0ZVijQ8oIdOjCk1OLtwuqA2Gmdp0Vi5k78xyHr4

AZJNDnVM6WoOCRxi0ZgHxtP6E9g4jtj6iABTdagEny
3qouVnlSk1LrTmuHI1h2H3PfSouUNsa54TjWuuRBaEKeD43hs/M/xGdiofrvxKp2IRXLZ7fVNpBHtF5h

aOJZ0MEYX379UxM7S1Ic9k0FwTIE5kSjhVXlKWyxkEu7F4/5B9/LaMefX42owzjdIL60TaCrYGwCQGW9

BaaxcpVC02wbgVha2RAm2tdE7sfwErpPyjCnk+Tpb6
LfM/5+LEEP66fNvuGXcLCDRv3r1B6WutOSf9LXQOEHqaAsMC/bAzveQ6rwc9UIZolmS4m43B8cwdH7aZ

g3xaz4NmQ/gI92GRDC/CyQQQ8cjPufBGDaulFgvZ88GwaXtn/3BNs2M2vvi4kGIYqnRFJSJJvKklPbvV

GqupC6Ldfnm4r0nx9ftlz2HMF/cQTzNKnSk+pjf8+v
EmnN8nrx4MrUGaUFBeP8qc+00xYrVSxo5k6di2h/qdwa+tiytO43pMWVjk1J0b38rRvY6fZlzhYCrZD9

x6vseoRNeeEbz/Pl2zrC1tIX3uWj6YxKNyJPGDRnVbGH4Hvi2pNMJoV/StXDjsh/OxdLxbDSgsACoSFx

HP4VSdk+3++ZHV7zZrrH5t6UpNJ3TNpZFeScBxiAf1
WX8nr8vVK21a3sD94tHIMpBxut0RYWPWP18OAx1l6GYqpJrwdWDY22c1Np9IBum27wdXVF6Sf/tbzvUJ

mUeAnaUETrwCGhwwVrgGC3yzi9I4cNsGNHjaDd64r/iloJHTVesRcJ3r2pPSY4HtykAyazg5hl7h1CZd

M+IC/sc+LEFiyYUyzFKU9odDDff7MsCXH5fJ82oCTk
feoniOCeTdcZdh6pxgQzCu6e1zttqTCG5Q6+l4fXLQ2witXZzyq1C2AoSgiKOZSUngoFrHH1Wkm9cPY2

lV9ZTZvwJtV54WzJSeVnRqTEQ6mI8zO+edU6slWnGICfu8thwAzz2pKSYU2pS6mAbrtqEcqVIuNr+kUy

SIIZLkRzUuZFLLNjsDjqE6aKfEbQe4nMglr+vH7sZl
AREXTyRJBkdyyFTjnI3hfXmE/t70/j4TYIzHfFvWlj5Y/g2qwDTD5RNHziKiORV5tWtN7yduIfFKi5iH

Erttcp+8lG8aFcqK7SU3w6EwuxlhLLLojHVaYIJ26CJPvV04U4DmtgjeagtNaujeoSvd6maVoPl7+o/F

tzNjxS7w09pas98TCLyt8A8g7Rijid4nzRie6TzqKB
kHWZJ4AhrihKNB1IInevFngGh2Hux0nqwqYOBSBaFF6Kt4EH0MEsuAE/C+DSlMiP9An6NTq81XE6IQq0

CVxs9EjRsqw3HG27mnQrQaz81q9NEd26vuHKf0ndE/KlBlAvxdQczATdx3XfelvmC6g2RIxhw/3eu5fk

r8lX/GtZ0ksusMRwQ6kcVPFsSSNAMLmuUyHgloqUF8
cWglIThfXB3pijiXUnCETC7GM3fr/rEdPGEjGn4JkkofSDuHskdA16McO4kt5H3/qcgs9PrkpRc8pYUO

nDgrJrsYMstOSP1cOq510yAlmSTXXqH0/Bz8i4WNHgZ7o52pSot4NvvFeDjigCyCkepF7xo8WlyZjc8P

t7vjnW/MgvRJpygP+WQvbdiLBdXwHOlm9xtEfaOI+w
lP6dxOlQIgqYSvZ04Sazspx/ea8AGEnjIBrD8IVSRwu4dLfJPQebsi4AnVDhBnj4Cb1kUFTI35YHfa7r

XKUrFFY3IEjrH+htx8118ZXqBv+2KeD7Y43KW7Wf9f4Ccm5BucAT1hu4P9fxhB2q6nFCfU90a3eDv4uI

e2iqMHtGROnckGL7B+iOQDssOfyx7HO24bW2GaUjC/
AFudejtECe7ZoKNmyGkYYWCwBiJddGagUwqU2r4gxZLYuipLsV+vALnfM5KoRhVNN1PGskY85sxqbnXk

u6rpJCYTjz/DvmVxbsNwnBxTJn2ovc2XaGLih3/g3nnLrvrQua0zjzr/SNzRqRxsZh4nTx8RVAUuRu4C

KagN8qwdIooyZlj+N439v1RgBQ24HahPeLrR9DWwka
OaaQZHNbz6OvjEHsK5R0rKm12QOCmC2GyMvHRWUWfFwI5X7CZ46n/RitQsagwNc5tYuapzG9kmTkRX2p

TunWCVW2kbgiJkHcGlcCzDucH17blXDUJbsFYHCxKVcP35gV9pesXsHexP4EFvcZ2IvqnkdnMqiDWOs8

THO2/BHtFTvyZ/mymaf5AnhEm3oPL6x7ryilLpzg2u
7TthblaiI4RLMR+HPGUz5A7AFNyZ1WaijiIf+haRQhfnR5LN9XVRFVdaYpPaVT0BBN0gbAPj45agbt4R

80D3R+qHt1+/7vpK65+WhXbSStH1K5dBJ8xsQstYZEuUipw0MX8P/K5jHdOKPhYS4gU9Q83X7X1SR6SK

kKzsaHCadcl9SFP8gqyhzmwKi+JeJEFhrw58FUJRk6
uPeDxnSjiWj/xkEqB7qB57QFjtR89sBMQ0EVINRYmOgnDc+oNim2VRT5cio1T7ocpz6KBleEdVLQpM92

JmTutzvJiInCj6KzKKTxxSMbFd1RupBAGTkzTTDHH5WWiZSHAKyjCrpWgPLuvvpLB1EmUpWpm6PPZu7c

SbbLB+bfUNrgxuupEw7+uzP5p7vLxdcmvkPBT+IElw
jhYhgiNZf61EBYkYqGYiDqhi9SCs567hlgvNCo5c3NTeq7ytFsLu8WNzJTwuRClo9PleRfFZRfYg07sb

dOMuhzcGnDV5S17SdPU+T4O6OWaxw8MBRk4VQlLBT8P9DSiVegA3ACYFo6oYpNePuqysNVJn8bdsd4s1

9elZDyAnkQ8tr+n3yOoQqA/ii/ANmuGafLXZO+aWHT
LbfNJ5ze5OqGpXWjDrmc+nGhdbIzyR4Kc5k3fqNHj65D9hbrcArKMj649okrROmBEWecBV9GSFPR79xo

TkITimahjmRmhi93djxCtwxrX6Eryt8bjysLreXnnMDQb6y2b2/Zw8qakOo0INJKOeldP9XwGaILKsMy

TO4UJAZ4LNCzNhUsX/tLmQqhdsmkshAo+9eRhMrf55
lc9yE1Vc99xhqwgOxf/bevgPaKjTqsIA06Tcwp6MUBEMpOOUrJirV8EJvaNh2Lc5uRse2s+q4czgc4lr

vOqcaxaCTaQFM1wsUUOGc94nMDTeWliPD5O9F6qkapIGHzwoNcqKAmg0a+fRJJ059MzFCl8fnwp7lmc0

3XbO+woUisbROF/gA8+FAPLjuix1JkZ2QigqNJging
ZxKF8f5ujl4eO+H5p/HeKcsH+0Pe9lexPdZf6Vbbpx/2sLTtstIUPe5yzSn0JupshXK8TIxQnujRaOKs

lhFr6cwLs4hErZ1ry7AI7S//0sqsy/hryUKMNr6tNBleGeDAaW552FFpmZboUo4eHLgQmBMC0kUGzSg+

WhLT4knaEG0ZDz+ddBCCNLMPBSAtdPg0OzVRo2d+Zo
nB2C6HJ0HuVLexZJRXIz8ArsZRsC6RMxRSqDL4ukTRWkrVxyYqpYUxpOjbnEPsRjyqKe//AcWYWrAFrT

GyA+6G2/N/0oCtts/L5I4bvdmXd9xWbOInY7Q/2AE2gcmnNuus/lucía+71+RPpobNoNH2nr0yRwCaW5Ahb

b+Dm2TkC/nYuXq8Vi63ta+8J8C2dCPvD915QLMPORM
kjyty9oUZmYF3WzRf+vVfOUvvpPyhYL7kecsdgzqA6rKSgjf8CZQVPUoSr8WkFBssp0HzUkzbQAZAKn+

guAtB4LTwO+4TcNXaaUZF8MdXHppZvZxRpQ2IWH63vYI7pa5QKqscMidIl+++VDlgyAoruIQVjKPVx2f

JTj6GG64wxSsZ5IjDKw6pdFk2s+7Kmm1eT2/Y9qE2C
rF2s7xCC2likFLRQ8nzEfnyrF5TTY9nUftqHE6g01nHgpMONPmtJPYapzmOb+oTUx6AjYnAavUV73WO8

nBZ3aWDwrrwx1bkfmKCLq2HY4aCgcvzNbhAu9hHm06VqZSBjGnoQaT8xb1R4Sb4adwlK03ZUU9tEf6Tz

YkvrmGzTPiwbL33/rTtS/yI7XmoToiIpZ4yu1L7VCV
9oBN+T+pbKaL0qCVcgt365vswL6DFbPXpQLGs4rQ4xVi+1IMsE45KgYy12VVP6XN/iPhaYBe+dMu4ucc

eZN+P/W+7dt6nl/7C+84CaLyde2TYgpnoCH+mNWX5ZjvUOt7QYLsY5qYGTdj61sTmYWjcU+afPSQeDvn

M4jntQz0+lDh+QLm5rL6XkNQMjgivU+0yoTSaLZTur
y1y6wrisezoG1HECZ5Rl4R4btARI0DswrajE102oSwbLK8ED/v6gcALVGfCzDWJpDiA8AVBVvzwZejbG

rql5vTIU1iaq/2PjLFC+7BDRGhgqIfQPT9HOkLcxlx5lU2XkUsZcVUqK0F+il7G/t/7LKcUVbjfnwhnG

gwJGNWR2a+wT7LaJL6RtF/QN2SJh1DCvJv7ElSLCxA
XxC2DjYVJjnSqNMrUjVgGEU1p95DB9plJn25r/0s2WCGNh07tW87c9o8qoXeH8iBfu09nBGCMPdwb2zo

ktDykpbtm1KPL5H9LsYkq1f27zXVor/TEO26W1NI7bpgwzWEfSGXvEzi2zsXmZI1xLQo7VtwJE52Fbdt

/+ygF2p770SO4+2t4vV6GnD/n+xjpnsR9Cn6kAtw2F
eH8+il3CO6rKvb+JlGLCdBTlYv7cY9BlZkMkAGVViJ3nBRQM7WpQAO3v9c/QbRp4j+4qi6DjOYuXodgz

wiDAJcM7A+fDdYNpzELv/krlNvlUG3vLFtcJvsDxIEh1bDwFzeR0Pa14ZsKCqkvq2G7b8AdDCxJUr4Ar

lDJsCQY8OoiujPE1wZCBE/KIghmNV14hS9e210VsSY
Wx/7LLMCR4gc03/jTUF4zeJJxDZ6A4Bv2pWV2g1XxrlYunT6JUsKWchJotOVxHcHZ0GlSDUnO831QeQn

50AVP3GKF7b/8NVshb+02e0wAar+6kn9+IGQ47XD9X96B3MsLv8uJ0mjrvEeRnPTwC47IgissacTPOPT

J1PS141iogmivGC8rR48B0/FKM/May1iSJQPiDJ2IP
5rhvS9ChQdJ5sPA6zwRgjCQXZ+bsawjxF4lPfLmZs964oKy5bwUop1fne3vQkPkQudPb7ta+EiN4+W9x

zUda9X83yaEOfi7E1uwMs/uwM1yJ0RMmopm0hkZ1oD8hABPuA7cZ1IUCgib+xN3VGYyq0QiZIdXnGYYz

LwsmVbURX2v0xn+oQZxNfjCetGiBBUP62ExESII0Dl
Rl4hUtRX3oyhvcHNOZ/aHEuymEik0iZ5sPEl9pte8C1UAoGccOlq8Rl9qAqyaO7nIcpHz9b75xXkIkCR

raYSqzX2WY/O+A5Wx/DErLcf8iRQXQ8b1URoypIk95r2S7vHsLENJWSSY8iDA8QBxBt7hsUWAjWUfch6

vDfk5UC6SRjBI+W4ouFsZ/DMo7szOXPC5cuHAGgfvG
o27OUDYICv18a90thvBjGexH3S7940wc+2arVy6eph9iJCrbrMINkOooNgbfHeYuNO4c4glQlJg2pOxV

qY0ZI0XS3n2AroB55uBvKjzbOmcLldpTtYFaVid4gNUkA4DUf2gMcMlT3R8Wi0e+9SJTHPoQ6epTqvtq

Ag/mSV/TUPJNOfv0hc+8PcQpvlkoAdRffeC17zs0qv
dPpi2QdU/zjBL6DDEO3vpgtM+gD9TfnUcN+3wohRGaBZqLNDjuQnBogZUBwEybupvwdAg41f2LiD9Zlk

6Z56Yf5XNi583EPw+8FO/T5JBZT7+U3bdrCjH3WW/TZMM4MtQSDjKMk4Gbjet/CVgJ1+Q546ENLNBSQq

oWWCb52zsIVXzMxMT8iGYuVAfhXTq35zp/1IZWtRFi
q4MC2L5I4v7pKYJV3QQ5F9Lw7dDyDibn4ias/eHn5jqWQaIBCSrpB3r5MjTm3pfNpFleKc8XVX28Gno5

9unzw6JLDcwwmDTv5qsyakpY6Ca2SUQXzWOyOYF+v1jgNZ4l5K01tLXhkKwnhh4isW0K0xqw93va4q8F

gbBNBJ9vVKqqYC9sUrY3Tt+uhFldGTJGeBdWQWR/KA
qrx8FLTgjNOVbUTA9al9zkVRBkbqFVb53GwQDpOh7jAyNyZMuy9adSWbVl13VqHDpNNbvyzCyhZfqv32

P1jGJx3fbNCWtZP+lZ/13ukujikgUyA4HJQCHYSu0vMpYlugPd+IDW1fbzrWZDpQcXCZsbWefSt1WJiI

2wyNH5qOHBgpNUjZ1t1ThZAdVGiPDk5UDRp9OXIu9v
bc+wMQWezETwnqXDp1lbEl3ColPgXYSU2bA4/4O8OtfQeiLCwayNTS5gZ6Gbgl3hgNQywjiJzF7Ky6tl

vSJP07DHdq5+xtGzAoXtY3JqCk/XKBkAvhV0Acp6cDx1WR4YDbvtmnRwtUg4G/zYzcf64Su4AXDoXSr/

g0MlRE2vyp60+bcJ8M7jAjLOWtzy/YeG6YiNCyYLaK
Y3vHkzK18Z4Iiv96o13rhLIyet1Tlqt9E6vZT4MVCW9wx0SuFuqG1Z5/Nj1/Wp4upvZIQi2UwQokKdRg

UjyN8JSe2TyY4km47k9a9eiIjTOwTEUV1Kf5EI98+QLkOrPEBU47gAZ2WXHdbcnFiM+cDQF6d+8+HwN/

cnMWtDZlC2/SMuuHupmvwvSkrzZqBXoCmA51Rh16or
CqhSsWQw/LF3rmQBxLODzFgJCY4XrgZA6yNTOysbLhmcbtol+9FROPqrYNEccfxNQwSoIo2tTe+lM33s

Zz615jHHW08isFzqb7LFmg1Sp4suWeGLnnSVtpZ5fu6xp9I/zOZyYGWVhTEfmCG+XEh5hJSbJMbN812G

S6kgNZQ45S3WuIRj7s/piz7McrokBy5pdqpG++Dhd8
WbHtEPHZm5sMfvpOsA9UDIb7jIjpYr+zlLNm07G6ivE74RGaEtfOrFUp8pg9Ed48UwH2yrtrk4D4yEd7

iW+tITofFk0ueZG6QYei8BML29kXGuR1cZvcYBVYz2qxU4kNtdhGDaVcZ3yMt6HJhOlWwdq4uYrGtwsY

mqlxRPxqzDf6T66cKzpJXgRLHKsiP0xe6dOytU9FvI
N9R/YOSTOvT9fyWxaP15NG+wwYQBLMB4t3kK0SyeUVo+PYlVct4sf+klUaOiygP3Q80Y5IBicawZV+w5

CBTZGSXVIFlNuu7rp7qu3J9V37Osp2Cbeq4WQkNBj/MwcgGjzrtfFcDFSpZJx52jpIrH9hBALlD8wtcM

/4h72QZ3ug7iwI6LtkMttTYuyRhlJWDCkekzzLuL29
K1lE4sfxwTwFJv9cj/eHcbZj+ahbAc5XrY7c2zHC3fNsVYcvILy2pWlBS8mmi5bkxJG8T2RZsPEB+zbs

58fG9EpbqyTkDILq9sN7zzqezv0Mac9ltB6Ofw7XHSeGT1DE9rHDtGZkiE02se8nl8BxKA2tSobKCEGI

f/+/ajfk/9dt8gjd2AR1P89/P0FTnU+YXGVK4ekMnY
jMrS9ekgA+2g8flQ2aY5d8SPKznUYz2YijuGf+qK2DxxLgKzw/C13liLLX6L1bpyhtJ632c/qD8VbdQl

CEU2+AtdzGkKqawUi6Dw6VTJDIxBzE5KqBkm5ENLnSxVsek+hSbEUoTWMWl8AZveWYV+F4Ug3wUep/P2

+FKLQJ4jwQHfiTnZS1QXFYiArSyCNmoX/ixJf8oAio
gUTLGw6XNAAFcRCsVqba8L0UNQtndaL2l0XgiOJobxeeoywlb140llRAYhvZZQ5ztG9Rvz/ZaPI7qoEk

M3scdKHmB6frvqD+GumevnhmYx8T5SnGh1h59U+CSOleVLn5rulSIgCH1pY0sm7f7eDL5zX4xDzj8kqo

v7Nyh3uOFqqDzXseibyWum5R2UoOKeYuc+nQ/OulNd
X5nTPy88OBMy17oaHu15SirrZYw4ZmzBKnVpQgPCQDlNySHd5YQBXAMZKcHFvZJJXe2h0KrVX7AcOCYr

yY9BkjeBXTR0gnh+OPZY0ioX68E5xVy9ox0r417oSFrRViJ8fzlnGteMhogC0vIyvjltJt21Fno2TQ8g

6OKvLKVwUqqlgjEIdve+BvJpatcWd7u337nFtgT3su
cYyUx64AnFM8KpaMMgXD/2joFAwq+asxXx3weAjHD4BytER51ivugbfYtOv8fvlTawjDSkmzwCCVN670

bZTG1ogoUy1fnptIAotYx0c+FDcquwN3bYsKJ4aCWZFLuh23kXGeIWr2qgAB2nA6BiOZNqUx+9t6z9zv

0xJJfy2TTCyXun+cm0jKjS5uZ7xz7QVYtcb2I1yTaZ
w3kPZp8fgAqf7BmK7usFoJ/s3R48ES9VpYpJN+5OWkvbWnOA5N/ryh3EBF2Dwyv6ivQjvA7sPiz3JfIj

iD6k39sgnF9ICAMs6qIq78MQyr8J4HNrjLfeH2zM0/aOkAzxulMjsn2jZDmBaKz0nG411N154dzf4zf+

4aP+0lbWxf1vJ2tXUYHJrtBljVeOS1re16F+Pm2Inp
HrtY/KFiJwx7mimUduUlSWFeG+DszrPvEXq11UWzNRDr66ufYNJqp3tBwaXGWI6pkRX13UyAvRvKBlqi

HxMNceUdn0I3wqsqaI9qmYgdep1vcK8am10UrovZIL0zmeSSqYoikG/aW9sfWLMifg9HppInVX5dQGsm

QjcI1usETbZZ1vOuH0jGBlUSRE97Ly80OfU2iOZ72G
pS7uw+U6SoQxBAcdm7tLWC/XYlQ6cEPpky37c7serBZFtmpR+cYTkFruhZMWQ73ijnB/v6owxF2BY7d+

nHvnF7SJ+KzYfyKuqP/fVSs45ItCYRC77wvqmkARKXoAqWAgDLZSMg5QepjglHDZwFRgvxw+YRbyPoY2

KkMH8aeSfXj1eltGRXtGHh+eT+XkpJN71asT+uEZV1
FHCEWvaDSlNPGxaO7Y3hUOTUMB5ZBatmw0uiQNlf7+y7HwY8CWrfuDfjTgLs2DZ80kSA28J4IhJHB8Dm

STGvw0kDcJO/sHjStAr8hryBkA7DVxGiBvMC6tgPyeO3K8sbZIyPKJRCGCxTHpNb8cvrhzTMrXO+nlBq

TKGLQp6x09inXhxg3isL5WTbYKGZWhDR4IFygXt4J2
UbvgpVudo+A3epp2vnAyPs9cnBB0K44YV0PKQQBVfr+nVOvwtSGNDpHQH85AXtWX9LayDbwXuYikqKgg

dh6zX83rkHNpGAKvie6ZvVUiVw9QcM4udBey2kSw4PqTR4b3CkKLfOMq0fokwgAO6bgOsIBxZtigy+HH

HImM1RGOSWv+Gtz5bWKjG7nITnw4YITl16Za3hpUO6
vz+VAqM/GFiCch2SGIZqJ1ainKUJbksi6BSnKzzp98rxXu6m1ftskotW9ju+MbBuEbDhkIx7BpkozLDD

M7zVodM4ptOHObx9SqmZuGn4n6XhX9w3PUkxta5ilArUA3MZp8fYUERxNuQ7ab5aK4IgDl+/7za/5dSU

0VgsXBa4/YZjkOCdkL/KS6tEdkWkBJVL40ynJB/6bq
u/WSpoMMS3m5BjYeOi72NA0rPdTEylqZKrOJZv3+R0uEnKEQUgd6SYTjFs5XkacHLQ2cNRRFsXvL8iMM

dU7rOFosPjDcfuG1q4dSAwO/PLAAHmGh7xyHQFufEHFylAnJBKE7/Nq4YiQRf7vM/2t2PM4ChJKo6RxR

pGUm+QpyMF0zbmplNRFlkT++1utAcxS/+rwtjboZAd
w5KlKfyAmVd7xxRufk8KG07k4gYcKEXZHdpJLNEWmYlEmg8tIY+TW7Dwb76SGgSQ9cROTMwpKOBykcx8

gjDpYMKJjDmbyUSe2zXsIuPn9bZ96c6r3lx1ZMlpaJdhgotOQQL4K/aLiIzpkUf8MF7mYPRcZ10nTbcE

AN7SM7UG9Big8rHlkqrE+bElBgWibrhYG3Bftv/mpa
tbOHP405R+mCISotOLyT/5JVG5/uqhDiBp1zfh3rTSgNUI715C40FGzeHxur7GpDtbk79OgC8U2y1Ve9

dty0C/NwW2Jodegj4DOyI7Fwi2hm7x1fl+YB0oktOMxuQye1T/l6+SVQyS1kyHzHqoBQCFTulT2SLVJq

nlOznsFRZfJpOBauOEYVVyc5ivoSvyJ/1/qcGxzuJ2
u88sVPG0yCk8Zhc3ZyljUejU9t9e5dAgcJlc76FxzcSqUfYqhvrSOcnun+aKCddjBc99fWUYMlmDGbmE

MGLtwkoY90QiCq/5Jva6bDvRCzTDOWxd9YYuSeUYVLYxL8j5zB8ie0ZhffSGu8hTQlLyu7I9HnItxWvk

ridlnZRrOh/wV88m3qWENpaWuK6E0FbgGl+GP8ESEV
pGfD+QeQmQoH7+WjA9iZJK39ug9h6o4LEmYHEYGPe2XcQjYcbEyAUstySRdgYCV0epLtQwmyJ6dYBz6K

kWnyEB+FsBFzI7iC8w7dGg88G2ahbhuWg/5SabMLNQOcFthxKfGPSHX0LxdUAwDjsRSfKDb2WZVHix4d

jOfzX9drTl11XqourHvIFu9E5RsdXT6GGWdfWkJGdN
yNiW3bSQQ/W0ievLXq0r3fZs7smpwRbrM9bkXi6YFiAtjrFa9mWeNwiHHEIch3ut+IwPLBGofvLyvt34

j1TfbcnQ2TNKnJl5gHtPMW0v0lOETLem6LIJfDCGakV9kZCt17B5MJejKCqqxHsRwjUitdFfQqbfk6AU

UYmj3w7/v7DlecMsRt7oY1UkTkhn6Hd1HyqgcoSjyn
YxY4T6RRBTyV204snhJQqfNMlJ+TP+BuqW7rcC9XzHEmQiKq6JTCod96QRFqyXS5qD/dyxKVXttKObh8

WN/jVJ/Ssi5F9g0y2yHkjzMR16ThFJGx1xQd0286qr+nZM41g24eSXPh8MoWe3StvBFAYK2eV8rQdRWC

vFDA+Zzxmm6NrhCRQpB5CR6skuB9Bq+sICeamgwddU
0UM+NslTvFSxF2rbzLsTswL5ehzRmL32jQyV/8Vd0tRiPBspsox1JrTfy4Q5NLIZSVaGX7xunEAScOug

vgna3vkfCLJJSZQ/s3MtMBF16/yQzn9D+QEQYgpsgBscVkLtV6724FP9zR3iIko7FbVVi6G8DGLMa6d5

Q7G15WcLjT1ZEWEnCUknb4OU/Nf/+VK9qoICivwHXv
MnpAkatvD1PF6cNIH6yRlQZ6DQzc+bsL3d7Qo/qp1dYimaUcgE5YPUQ/SDsVy6+Yp3r6ZQOZMBcwCYFW

jWuM+vvjp4zIPevvsaieJaYCi0aUii15wYORGjT+R1FlI0N8eFMqEOhRHclnxFdX2xg2ds/dKdMKf0uk

M4mftOkO3G5YoaeLdWRrW9vymfn+3WxtPr8tVUvjHd
RxHMxNRgXlfXkpbdBkVPiIgpG0TAnGSuNrSDAO2wUwEZcZPBJwBMushdGd4pgqm0vZg/opxq34iu+R9c

iR+U7PNStSDSZ8yxQ9wAVcqkZAg0TB0UAjGE/Eb6GIcU2B7t12KjhSicDFdahQwGW69AGQIe1Cx5swZ8

4+myzbOMVYyegt8XLhHeMCkz2tXQ8L9RLjg9x/Ahj6
VFlQBrlzv0DZCfEpBT8PoTLUxC9lkJBi8xrTS+aoBNMV5ls/WrLa7jqAy5JDSRaJigr3s9jh3xCS8W+h

Y6SIPM6HKKNxFXaG6GNgbvBkvY+d694gAOMXHw8LCkX81+VQskk9ZnRh5g5k8kzc9gSmq04tK4g6vU7v

OReynvHCYQ5bKCMv0wVev5gMr3SJoL9mh4lnIKNVtc
wftq+v6JWRT/qserrxpw/pRHyswfItOUWQyfuTp2HJCt1NnIftyzUuBIZHJtDYxmEgjSGA3o9kiudV2S

qqKeDAT4ZXf0yUdqgqNAqbgrlQI4TWjvTFo/PUElalVXLD2jn9YUi3/9JeLiqUIhJcDswAMb0Z6MErwW

Z+wMyP0tdKpfrBWnNF7NWNGl0Zwb0oB8438i9Awdzp
i93+BmCUdelw5dseYLuI07yj5659SmMPChWariHwxe+/Y3FayGlrUeBQyuhvWtu3+RWtil80ES7jOjC9

s600pqUQ8JxPemOCTFFCS7GyCqeH54IVcjFqc2wGpuRaOJgxBZUeDGv+cUWr2IqggVIM3XC2gki/uMRf

U7ChJs75F1VvhOpX1NaMADNqPr4jEjgoylA0D97lcA
jsTdnKe3LsLlsUZcxmMJf8U6Ycf6MrF0pRO2xmaiFv1x9B7MNMPYDXt/sNLHjRyATRytTa91vDGrvRRz

1DuZW+5qhJQn45qKR4AL21zhQnISKURsooSJ5I1pJfXksWG+VqfP5X3pRi6WoFkM3nejTFq3YpE0f0Rg

CFjaTs3jHE55MK0GJWgCjoGhWFMRG7US/xzIhifMn7
R4/tQpGBK3ZmTePOVEtzCyXcS5m+8eiHNxSVG1NP/4WWaH7k6/+7nfU+OTML7urueVjbsMSzy+1Btprf

P86242ynyigBa1r7NJeFtaPRMrbkZjgA8yU5WeLn2rfHKbc241xPeaaoNFDQ3PTBvjlw/ORD7pOITOiv

MlyPfHodBvfQEbmfpCvht9o9FmHM1SqwzYI1x7wF8J
Fil4ZmSpqfzQlRvL+bXkODeOu8V4E5+YUqp49+KHznWSiZhFHY19XqB8j1EMNRMXCnJdGGmNBQjryvCz

QsAK2D71UMIuez6nf+c2pt5NTHQGcJGsYIDJb2EpZpqOFHpzd6K14TCw7jPEM9SrDuMRmzlZ0AxmxFXX

w3fXlo45hyDPCqWvcVDZu/1Fi0hlA7csKjK+KQcOVm
+2mDsK4s/gIzkv+Q/JyR5rmnJnaHcjd81A5vll+jm1cboLpR1JBsQjjmFs+W6TcXc3E5ZwA8Kr6P4JPk

hk28eQeAg7Z57Ym4GjcVf5Hy/x86hV/Dq9N0Ahb8G5ZeZkr4cTSwOmC7QYUEJPeX+kuhh7x+NizBQ+H3

LglKJN422J6oB91HSWy0ByveDJF7eTYLEnCSrO03zy
gYaFsfhxeWYDfoXTnPbpD6RDeRl028GAKd/v2cHjKjtRQEtK2KqSSB6VUBWiF+F0P+qruu/48q/+obs8

SCiM62QgCd2DcLCJO5bSxDlx9drOlC3EjM+Qs96agU9X0DDCFyrJ9AXDxWxBiK07kzadX/932m4F3Hyj

xrwvZyHvpgFfAmZCd8VpgWvKjFRppAb71SiCZNXzUk
ACJe1uftrDamMb0fSxfHnuXhX/YSYV+KmmyOKqPBx91DReJuoBeXcYAGwpOugegMaE+fzJEKHwQ4eL6z

cABFc0d2Mkio1QRmkhLz2hjT0ZtrIqXnkI7YJBiq01YpxVnyR+6vuFj3K2G/Ipx1LfjVnUgKbQL4NPAC

TPLNWByRHy90a42JDZjMPFlnpniVH1W4NGHbHW3GhX
TKx9HUrGcS0/++EHfiC4ehwi19isgV/a1q44GovwUZQ+xFoOICvzCsovEaqoMG20lkOJ+QY9/Vwu7r2i

h9CBeCJmXoNp/nObXAdKIG2H9ujrsYd5CRpCtnoANzRo0/v8d6NKf/hNkHivlBAH+0wsxoU3KaPOA1HF

zXXOBQ0Dp5G5/jDDEDGP9pFupaEgYC00eyGHqdGg0R
aZOwtHmei3dYCVCBTyg1h3oOkoQTW5Ot5dqu14+1GJhhTzktFh97yp5Xm0zfYJ7y7YkqGKfgPmqRSwDB

k7QPpGulBXgwLDpolbTN5qlJC5E/Cbo6uJaLgWT3r4/fzAQB4eU5H/S6JOS1b1RggxALzBJPAwCbVY0U

oR06MyGftg0ms4aDG+MR/bZL3ClNv32pof6jJC7sx7
9mFMOl6KHAgeTna2Zsd9huz62C40Au5l0TDJb1Qq8kLszl1jtSm5MRNK6qKR8oY8xu7iyBtkhKWyp7qB

uqaYHMnANlqfREAr6WzMctxLtyV1ZRigbLVeO76rLe3Scukt4DYhOCohBwh2kMZNLiDbNxE1D9o0fCpQ

zH9IXx2XqVQPRzpIVDLVB83nW9FNNV5/dQXd63r4W3
WqG+UzxrNfFXvOeFZZ0ffNnsnuhCk/y9X1Jr3NynCl4scSDBIf9Sgew/gJgOvO/yrzYBvpWZhgNx541w

VhwpDfIVfOorNfQPXQEyDEVxtcaX/9oMUcVRzYRFi9WHKSPpFhy/b2RB5g1YjJDnArxy0vZ6OHVyEwC2

Yz7EpU66v9xlHvZJ6HUjz1O/3eqeQIHSeZMYoAnezO
8QoiWE0/+lDp0GGAW6xyCyeQin463lPkRy19FaduwPu7yI52WrwNM2BZEMgo48mwdrlhYFIVOHGInrUx

tmHVOCVSvFMsHe5CCr5Y70K7WcFQysDfAUtnunb6zy04GzIAcdRLIG7/iAaUZk16dF6bJ4VChAtX7GOa

ezc0vsUi2lXdV9IC6HrdhV04ciLLuKIzC+b5L5mNaK
/phE9AuIB7uJ3aE5NNssknlVt4iPdpXHvxnROrewZU3ZzuTieNNvRoLjReI97cmPNhXN09TK15e5SXpl

S3EjFJu9HxU2YZXA84T8TOgzglChrf6RzrC1qWJY5upjuGtxoke1rau4kt0eb2icqHi4f503yJUI6Xn4

CVz68zWY4xP7l+SML064EYfsRnyNw/tFhHStLgMn5w
INQK0w2kDnaY18FvfiAy01a288GqFXp6JqO+9fgrCsdfs9u7RYu2jR8OYd0SUJEXlYxJldGseTXe11LJ

PkPEB8UMi/bnC9x5EWIWakK1ZjcE5e2eQ3BGAIDm7v0xa2DTz+4M+l3AB9o5srwbTUvSpxPIuJ5YmHEo

ryLcsL/9cuvKlYeP4INi2pjOF5yVDC0/rPYg2Qcmut
hh89BkXFn/3Wb2g+jX2+TsBrr5JGi02Xl4ENzfRpPt0sblpT5p8Qc8+gXfWopw8sYMDOkTKfK5iuY1Vc

ubU9njC11G9ziFTlK6BnTMzSlZoGs8EtkzlsMPam08xCm0QXj8neRqUpTK193545gNenaUw36JQmKUlw

R3B+J0cTiLnZZIxWmMoMH6Two+MYNJMjTBSqIYF+kW
91qjIX3zbxW4oRdUdD44oxma18wLFRm1ksWTFKPW6LJMa2aD075PxqPttr2imZdgMgleQA64L1CsVhdy

+h4/hK+wOSWlaU43t61llt9HwnaAw3mKY/tIyDWIkq3Z1o9Vmu3rEtjf5lvkgfzBsJFLP4oSDK30F4kH

V/ArgpofvkEZbT17tvc9rTPZHpFEYdmFzmNzuTGEvi
IASS7A1fuzcKW8MMMCzDkJLLIHBKrs1hLUvFrCqBkGie35euTxL0Lwx+Ppi9YyEw8HoZm1SBDJyouifH

HVaKyods3VKXugXpWJ9BTxU+XQdIUR7p0fObOxYv7DWx5nN+bR/MQsK0DQtwlxlCVchvesVM1LfG6YZt

c60ZjFAcVMY/LZxATwg9KVK6cgtTAQ9ZcZ7NqO7R7f
0Wyjrhl2gqIbgm/6lrJ7/teE7hqDaOrNm/KeqVTbbOdjdTGvMvoPyMLbzAq7qzhxDON0bBGKZkNLxD0a

yL30DD1iEbmSBIfbC3ltiYwuXS5wnOq41CXBvVDxhjD+K5o9OZ+ZmboYoKt79otEa+NYH3mRnslEnQe4

e4AH/x5SRHQSxM1OFrOouF9CY5lTY34Rimfon4H+CH
/0jRBJDck4YIrvcW0MR1d85WC1GaxGuI27lobq4YsnGJF1C1Aqqw4lzU+yuwInYAwIF3R/2eh/63zHSn

6Vh9FFLnn/p8aN6/5FDX5bZ9aQ6DXYrxvgOwZ39o9CdFJgJzIMrSaR5Yj4ZblHqYcje99+6G7RdLmE1u

Wk/uvmxCW6RfFNuILgAkEbZYrtYcY3+sONJavkkvi3
bfk9XU0uuMYzZ5nIgvdUcPDrUI6+TY+R+Y4mY2C/QtzDFbTlmJ0qIUXFcLZfc2reIkATKbgvrhekdLL3

RSk5H51PmelOu9MBkKNHtMrvEZ9zHtOczhZYucgWP5wL1fbTDwT/MqXaFVGUCe2uWdBqT3h6xcReayVA

3hFhdSL/IFN9srDTObOYlSR7Cu4AWag0eOceSlNA3+
IRNJ4x1PxohduBJw84B733fJ2g1BvXigRY4fo7oE7IErQ81zIq5OmrklcgcVNAssVHtGxji+xQTeTEY8

/ZbH/uFLi+EqLdT7/jqnkhwQS/KBZ6MK4m25xlbXe9qKZzW/sACWIh3XGtLe/JqA45ZIbc1IdYM/vXxW

qj7mn4qfClxvoNbsIOZaxfC3l3jOp6klqOqdrj0no9
CVJuUYyFNIL/kGT1xDTgie8uhtUXWNsR3tZU1U+kF4uyYojWCnoOixudfbhN5W/T0rOMOdfSIG+OGB5l

SW+AhDN2UrNjmMLPGk8LL2eKuTAM/iKDsEkuIwzo9EtAfNnMn0LK5EbvYxGavPqweRfflFIAlkoP/g5H

2IMn6sEdSb0caG80e3Go6a802TpYN21B24owj71jm/
ii64BN4iCeRlmImiXfu9TrxNxMT8zh6IsWKmobTsKLHlk/rOkrnLiCTOH5FPJrkoL8EV1iKKgGRIPVVb

swOsYIpsNKHmOZy38zV03dp5hJT3lGIXAObPlETkoZn/vAqvpOD4wztQAZyG+3kP8t+43/ZzGwtSdbYe

NpyNsB/kiIV22YQDVlwNwCWwBOE89S32Qw/Y63aB8K
2wcByoCrObkYj/rkm7pY4cDBheK3OuDFL3sh7WMxCLGPq/FfM32PGlt5Xqei286AiRdt+8MMZ52ymUx1

Kj5+MduWv6tGyK7wzCD4PPvJDm8eVZ/zJOf85i6ketG060rVIwn8qFptj/VFu5YQrr/wNu+51H7u180k

yGNuWXtyBrTFIJLb9Yokpmbdh3/9eevC/7pp8i6Yst
52Onrr9F7aac4CA8iNi1p/P8yJOQb0078dboFJmgDTi4QE69fzCp5KvgBuaZTpO20surX9ExjDYgD2Cn

gqtfaAogoprK+YrW9ZP8XJiCxXWYbW3dEhlXf7tu/vQzWGEiXP3bPh4+EVbit6C422eH6qP5fXLtyK4q

WZKj8TFB2RJpYnOp9Spf9Eckb4p074ziOMKds7q6WR
sLB6wmWsUxcFAGv51eeyl9DGXpeFE06nzEckrx4tzEJLG20+6EjgJA14qpH9zoz8C0STACF2s+2l+S/b

NeYlOsBRBbCq846UEWTZi328Ezl6YBx5lyauSAy+Kheg2uVNax7UtRNcv04lL+7Ic7GNBqfGXpuxDMTT

baFC2ncJ1aqL5GTv9iYs4yHa/qGeSPJVdErziGXyPB
yv3zfUkkenXXYu338OYsgoe3b/ezu9qZKdaiziABIV/QRqZ1dOIZvREV1jmVV56NwFlC3SRCKccg7A2E

0nsrqKLh/EQuPwyOiLEz5SfI6m62Q7tq8u8155wA200zZZwacGKIKwCKbQy34uQUHEnrqLin3hpOV0eA

A01HvbIRW1lR0WPYxNp7Lee6OSeg/kzputt80yR+m9
vyd5SvhgLZ7nOHRvjIn3siJ4a99T6avcWrYacOARkLlZOPQAppInUfdkP6NR+ic6CwD38ZAtozGsYek0

gcTk1UPtuQeWk2zUEVQ1rwMKSECjAnV80Cor2UjFwUdsWT311RUNHQWmXitmKGAZqiK+I/m4S9G1Ca8N

d4IoJU1mlqqAOW+74Mp4PjqdR66LW2PGtaFd/I9whD
0ZxAdAiBW4EITnJ/bEE8rNivgEybDI5KKayJDby+Ib6Q/TyjNk3V1nb0brxhAxMmuRnyPBVji+O8/VJm

NEQ5JWSojKGLW4PHamn76ivrm2R9xE0oVBtdGYb5GOKRzJ9enil7OFfWPWhI4yXT7tNmDuMXE71WTg8/

xnml49UY0dycGKDwCGz58pJHFKPVT06DDLyqk6Yz6+
ZTLYf2dgGglFV641dirvKoqPD4r5uHZMMaWeZ2Mu00af2pOkxX5dePFydrMAZBYocWV50YW3OtHqrBhq

B1hWywpoecm/eM1yKDJOaEJXixZJJRydkpGI6VGQn9dL8aEM6Dr919imfp2Gr9UtWs5xOT2P8zqvVUqm

j0qOJ5fe6SVanXSJwNydoqiPKv4Z6VIM4M+VpvaOwD
JOZ+3eOlrkxta8PYP5K6H1Hi05VGns2Z7H2nuRiiCgY86wroz0srZ5boGKCBoqD+YwCw0Vp2qLUv4rTw

ZyJb7mBJu21cf++cg/O4Qt8rSDXunXPH3fkp1n0HW/Vti/GPFppgw5fjwsSQ3B4/l0FWQcqvnEcIUvyh

I9sIuT6eN3FeLBIN+I//dSrwwkGJJSSWeiiFi1RwVh
AymKzB2AmzUo2oRmGOpvA9/MaeSArMSxnzhiqPK2raw3hLQgxL0oymF1FCu67uJvAn7mq0dVmJTAiLcd

Q1/KwoJG/WiDMNywq3ojoE+rgDl/+Snv/PYy+Xmy/ms/vstn3PsemK1X3iBKHh0fA8gU/wjlRcRHswBo

vOavR6pbckkPqE7oNmh8xxUNVvOSk7mcnk8ykt4Zfj
Ke2TXypYv5M7wnZBCo0Ptj2RjZYX4Yu/2da+40Eciq+JPyBDivJaN+wHtJPsVlhhLHAVMeqQPM3jvn2e

qbcV+cSjA9gkEsBJ2riTMiGL7BXajidZ61Cm64JVTJm7uwlZSFOXnS6AG+0KXC3IxIHVtPLV6G8KrHGP

9oCOwb2r2owOdMVLnJjjYBjnAx6bEdNwAiZmvG7pV1
dw1hi67Rj5kKUl5eChS0GeUR2Oq6yRrQhtIsqa1NuwKfQzWmSTCYFfqvh57+CBLLNhacNRQxGtkIkuE5

Kf8HvTI5H6Gzj96DAqak0QF6cfEZVwkQ5GOCltVPS3sU91B75qyJvDXUCtUKNJA1tNYukvbxJiQ0fyL/

mr9WFwA671KOwQrj03BNty5tCB/tG1/zaa0+ExkyQr
VMdTswYZ+gDw5TFNoOFjxr8GnPJtkOW9V7eg4rqEWXjIKPCwWNWRRurT3AbSS1kZrq6jt+t396jFjHsP

8MKaByXeJuPZNfdCmigo0JTAg8Yy8I8u1kBMH9AqY3Sn1Z+t3yawQ9Bf4/HBProIYc7oMK60MCVvqmfi

L6Zx+mPPEEF685P3kmOWX7hBB5JE1R78R4o5rS1Rva
0wEPbADtdxVIUOvPX6O9lDXujoof8Qv1pUYP9b5d3NUf22RaV+tC2McK71/dDWvbb4lHOTLfw0MJ/sn7

ZHeVojXbTo+pkZf8frWXgzd2M7MrMsEV4P9gl3O0BvXgLlCKUZTQ9+NTs7PsQksmCNh1GyRsydDza6jd

ADG+WtDhmqqb7xSZjpPW+G7p857M1+JjfNpJGvQSDD
3lODeoAU8h8kN0S3ryS6j9zbQrgTuskGc2ZBe4t469lNZb4NJZ/Xuhj8iUqBhQRsd5P+UIesSEz/ieb9

mRShrWslQywotrf5ma6QIuaf4vPjPRWIYWbyO0xjTfonhhcGz9ybQdKu1tajLbbALk1GLvGc07/gENAz

hat7tic04M7MbsvFm+cowBk/rwwCenWi6mPyTgTwXr
XesJCAWVfd6W6SYxuLghT/Om+oHZ9yYTN5N6bHF9oJflLTFI4kd17DxWWIHHE98oTXbf9O8iKG6FkOe0

p1dmPUy8nWtL2R573ZAegAMN1gx8uRNweCIxn3p1lAaws6deIB9lQ/o1+WZ8v02+RfIeJhdDl4de7pep

Ey1c6154NuafB1SAtc6KQ5SSeCBD2wd8dbXrHRwWQp
55q74MW73TJoWvUSsWQ6Sl60PZa5vkrEk3s43TWjx9nUtIefDLecgclz3vosf9I8carYBOpWLgIZ+Verito

Asf5pM41lJ2bz1RaXTlRfda2XEDOleG/8gYLzL0dZrhBoKRBnLVj93aGbh0tPE9yLn83LnVGPfVfRhnL

BdpE7om64IGI4d4CAnbcHSSF/SyuV1PSg7VaeBi3P6
V4bbbXAOE+vwjSGLamVLJl55dNVS3uMU3WjLkjmUWEIbcNtnkuyLJZk4ClYfYUUOvFaLgI8IMcI7s/7H

QEHYkYZbICjRQgQsBPrV8uxdC7TD6ZdWHWoWDVZrLeJZ5xq0MkYQ0YONzQG97iZDGCqv6w0webLPdYwI

+1OY9hKmiAtFy0qBcnnp6meSgVb5XnzxkWIKLeCZtE
AJXIhAXjVCqVvuW55PPnJM8cG5QwprBvvg91dADwG6vbDOIFNDawa8WY6weThAu4yc0qp8iitDPTmfPK

7RPl5AZb3L2Tfqeamoivb7wyKInWaRrWj/DAwnfgmSUsGSe8hYk2QKxthhCIKlu/fxYWsrdqV5utB8xS

Q5+mtdTEX+klLiNYi1J7rWfOtE4Ke+U7nHt/bwrvVK
mE4ZFMgbsCSkwluGs0rIR4z6E3OnfsmowZCWuHL1H7gXBmJQmxQXo+EYcu0lN27pegqdPslKxBZ5F8Y5

KrjLDhjn3uqyDAuk5d5lXhXmM07dB0dsjK+6vqt8ZLIgBi+WjoiyX9/d0gxBE0FjgxFp+7Wqql56iv/3

mJYDYkkWVWB625fcNYLh47toGeSpaIQjDUD8KXJ2Mw
qmJimkqU05N2MZa2n+mxTgqkxCBZ+0BxwfVKq/jvDaspUXoI0d2VOv594UkWk67MrfIVQ6Xa/kJpeGSk

P4k3hkA9q2ZMn5bP/bgYniqU7/LQk3bzPN5T47bhJcOCeHkkk/yIKca/rtADVl/4kPdNZeWLGdz7l70D

w2ewl6MyDhTcvSUwrpr0i9RkL8oE50QiEfeOWUIqc+
44QdxHKxF/hvJsB6i3K0sXE/YUuLo+Ag/dR1mvP95j0xwv0i3eZtmjGzCevsgXJcqx+NKudk/fgi6Z3u

Kx/Q7jEdZw5bPNVUewYNI0PRJ66qvMkBeSN6ywvK1DsWgYyMPILypgTSRaw1IGP/ZkOYpVZ/HkOCsUQM

1IrXrT6qZKsR1XaqtbVtzfQWLT2a0h58QdEzxfIRIB
we8jlEpe63aEQoi2qRWL5u8uAthicp4ED+hzrCORgCRbBimhR3K7Dpz1fpjpQPSO8v0zGgj+Lro3TsrK

oJJfqia63OvtooM8T+YnRnG8D0y5l784bLa+nguwTHXaUpKIxAgBBz25SyyWgp9KAahqH5NhgPZCAc8y

ARmBWxjkUwxY99qaF9fZ0aiuL3t0mx+Hlvb8pMkHws
y9CrrJG6HtpQDUzCTy1t+RbNhdGGjgJwlc4+8yusJlLH402jUd3+2GxbnHqo7wQLZlWtC8ydgOLgmf8Z

l0p7QikgJ0LuXrCHzBmnBGP/aL5T0fNfcjPerUE4QUzzt+8Intw9b2YtmhKtDtegD77PaA2lqClRlc6k

x1Mr4wEMw1eUsgh4xeDCRZSK8JUyIab7V1jfio7pvu
yE9gfNaDkmjCOX9KkjIUZTwVnkZBf14lNniQiwSDTdqerVA4NCf92iLFWIbhiynPsn0OHXmq+xnUHOWI

dGLrkdu/DmjPppiAACvX9ExgtopzP2Nu8UFXWpt+EhByAbJ3P5ghgcbGBH69ATT6DSTf/mOM+1la7oLd

SUBb4+s4sbMBlas51ajBr686+McSLbD75TbyQFXGxq
71zXvfRJkLbPGzd1FlbSqVbbf1uuGmfse/hJB77oLJVtAdz4FWU0GhghvKszvQE7cT4VTmpCfkvpz6pe

/FuKSskm5T5lG4H3w/7+BCXym2O0pg6gv7vsoJh6JraronxOVN3yyZrHl0vCR/yBpY8Dyd7F1dl6L6xX

3vdEuHF0phA08g9akSUczNyPB8dDsurQwGls1qD0Bu
/iQ+5lYX/p2ApluF8ipE2mX2HtcFg2447Xe++mMDQ/n2+t90l4Xg0epaknssbHLMJTUU/lASEsNw5Myd

E74BwBikhXdvU3DP//phcCUW6ddSka2cuiU3JgHiJTOg4Pq7ZtQBMccjlBVLEG13/U22diY2uDN/Er+n

yZV7bko9NHgRHnUuxC1gXiWdsCN095Knq8Jkvr7944
VcMFd0gyIKGI4i8aOXWXA3uhpLTxFw8F1Oet/spe+VI4GmSDwPH3Zi/LrL0CO5ZT1yztWIgptd+egroL

fHa9Fb9OU5NlgsDwBPjDL26cPex66N61eyRdS9FJ+KM/969GmBod0hU35Qp4ExO1FROdEqKOtfVdC9MP

myXG+0JEcybJMJK090pri1AHmM2ch16CRBT3PgcfLS
hP8ZkaxchPkCFJ+Z7TzgUPK3oYKjI06Exehbur+e/Fq02oa92cuoem7kD6GPr84/mUbppZvYEaKAFWgJ

73jCSc0I8rb1IeVI5k0j8DYSCrU3QDgU1VtVEM7JTSMidptcBMgeXWpET1TcXbZmmHnCfioJCgzyiIEZ

Z4fH+ff+Lzw/71XOdaZ+92zf403mXX9MEngjLCItIq
HArGjUzEF3OoB+2j58wPk5XRdPp8/aQfQCvMiMJDcCn6sFUssa9RNRrhQHNQArkvjE2IVgnIONctNbNq

CUm/aT8V+j4Reoz1utDawNmXC+o0Dl0jsKHw9JiRg3Uv3MMDTvvcwPmLaNmPmiwVpZFtnxRFVm0uiA5L

glaB9bCUWnuZgXEKNDWMA5KBs3+bEwfyDy8CjljUEK
tVzy26Zolgj9VC1m/+xKgOMimaGC/3uj/I3uRvLiyxunZIGPElS6S4o86ik21GqZ9QYEEisEPNH++NBy

Jg7auW6BFPMTgLvlJRce5ccIUDsBQkKbITD6iNNlPeVf/ksmAqae2gXwplPdCebK5v4jU+7bYzhJ1CaR

iuAe7bDYJx+tvTRWntysCQUbcLscoUsvGbrmWmTYf6
OUEK8WXDKdOcr+rhn3Zl6Y0ukD9a46iwZt73m4jNG6L08DuFDvj1tPMUlCCAvWKr4Tf+1Ca9mmBYHHBF

dsGC5zFFy0dMYMxA5qWnCR6s+gjqStXgU1bmiqixJLMUnZ8lkiNlNac99tkj/VVnG9RGFJwwNWIQMYvC

0fezAv78Btqag6SSUg9apwwFDwzeqt9J2qZaDKehrR
ehNRwIJJGpdqIh97ge5fsHk92IFa/shLP3pf4/MdckCaDQWBeWEH9WpUh0T0k3cR8IL+lZv6135q4v7Z

YL42QyyW5cT7Qyymvs80Hz2/tJNRU1dBlcIBQcDqoL1dyU9EzvDx4JKdSDC7W2vgBHyf9BS4CvZ4cA1u

Bbfi9GJLMaPFkYTj4T5qA+SkpAM+lDkWYPFe70msqQ
f1N/YHfaTlmzvPSfUaBkOjyTjSumMHnTlGA0feQGtP/Lz6bHsttSpz7mOAPTzMdY1Xj8KrvxMOSh75lA

PnmTeqXRW1vHqD7BU5XHRIv3P/GvRPFhPNJW+1zb6bRZwggoGBnM15HBMAYjssUq3NSXkN+FlMVwknuX

Z51lkj+IKji785CxZ5Dq2ub3e1Gyqd3urM22Vz4XT0
d/LqWzII8f3qRI3SHvwbPbVj68htJjWlMECh0S8goAGrfW/FdqIU4MHvfItsZnFXh0ijicTJ0Nym9Zmd

LB8+CkJnA5kGsoyeega7d8nXM5+RjBINs1//1b9vb8b7PK1HeYIuEjwWBFuEyv4dn8tp3dkgeUyscLIC

Pf3YpLuospAfHUz7+hzXfsPWpuzlbnrpep+rF+CxBY
RYWcCD4HpdwdYkw/3gKmvlr/7Z9/Zxt5+S0BpEoP12ZlS3koMyRqlA3PjTMPEG5j17OY+SNt9Cgc7nx5

u9WNWhuFy5lfqMDdZlvAtUOiaa/1hWAvK3hLyNfa6BlftqkDxuYYVQKtSuURAZnRanlDcd4mth40gMqQ

0A3gJAEO6QWN53ghh8F0D1g93VxsIJTNGugMtA07jv
CpDYium7HqFipyWnERyRIL1wcubXpTQyb/PSLFFtajlCXsrtR+QJtCDeB+lffCsvIKI8rQIyobsim96e

xqMXz6cfO3pQSnECdXkXQ7MBYEMCGDXN7mBp/vBxk2Vl5bJi+xBXkGYRN+Kut0z63nCYcEmZAKT6ewXW

nAYTTzBvyPjEmY7PvW79hzo9CYY6F6xRsbHSrCyJhm
je0fmj8ZRqDqaje7JL0kAhFCLnt9mBpzdU/9TTLNOZ4z7OGe+vSGUu5TladuZd5x5dENL2gTiBh3598O

adAsne32W4aXP6LmLwdT0eXP/UhpvRrvQgHGxaV3TxXzEA+oxRmJagEr0+Mu8oxEyZ+jJloVJVAoRSj9

2Zfn8hvT23ulcZr/qA+EWYaYYgRs1/yZLvKum0qv0v
Ezl0cS4owqfLjLKbZjBDbvYRXGpCY8lg9BlXIzn6cGkS2fdHOz6HkQJakd26P1HGyLiE4lH/5vZqnXjD

p1evc79ySR47czUn9oCZMt6Npbk1HQvZCl+HeaabMrCey9suC3kZ9e8Tv4hxG46k4N7W38CI09hZC+uZ

x/n/K36E7B5azxQEOrMRgD2rqeD+rFC+bRsj0poAPt
C0fWQxWBuL15dnc5Kpt6UIKAIZASR0eIa5PWWfaiQ6fAGAdhkQpb3T2Ay3AENGu3pMOnuZW/yGGvz2Tl

Huu68DHTbwpnEk2SVhhGSqrzR6084hHv/iN6FYdB32gRgNvUS6GZ4eyqMm7itxGoXns4Vhp6AoMKg/vz

KA51SKFQoPml+c+AKuL9yIlF4+CGuzZS8pqjxMVdtf
8fg6gWO8yyLMgr2wjo+oO+nsdWc4bU+8ZFPMAsCSa0tuS83QLtgY+2edNm9cIrAon2XreUGyXtPHJfTd

9ZI7CV+5fJq8cVH5PIVv+VXw43oJMxT/I/OJeNbGym4Gb2qwyXfq7cbXKL4dHxiF7TFvQhJzIbCmOYcH

yAsyDIyFnqYXoaKnOS9TCOp8yCLmp35rXQDr9YqrsT
zxnHiZayvtivQr9cbsaD9gcNlPzUxX9Jv3pFceCSzUTN/zb7SSg/sK4Lx5P7DL48aBoI2fCBO7JWg263

i3GKTPR3LmEqCnZ0BhldmgJdbulWMtsN2N/NY9uzdIvsqhcMAEUeVN98JohQB66u+4BJ2uIm6EuD1sgB

CWKi9g1sUjNrEvVHY8/nzeI9e/P7D9goAZglLOh1Ao
H4opey/bccCW9mKRFy+eQ84oEmAdjd6r8Dr0wm7R3EhN19MVHl4brQTOLzB9YPIHi8EENnTf+oteFe9G

gKftjx6RflpyVUzQcrD+F0cpLI2ek7+yakPgjatG4JUCSSNOvLnE+NjAzfCm+4t5ca7kT6r4N/pVbFLV

PrQ6bOTGV9GE50oNjKhJZk9eEexBH77YAjk3ntDRTr
epmfSWmioaFQmhBeHzZctlHJSIjd3GVQZWqGuHFjd8KFUepwVnNy9m7Q0UlqwCug66bP0IzHsGCXvjon

dVgB7UvlLh5HNBcqbQvMWMMVf5Z4J28Yn++bXo7OVd4RH1orp8ys/Q43UwPSLgnJiLsLv2FOcPI1CVN+

O02Aq19stUoBBiJjfN3kfFHKYyWUvyCSlAsBLORCqp
MqHX4SAfGFqQTaRtuGvY6Ioq9KeiT9ufoSnP3NVzlItQYJ+eXQOjfzJ8pRof2NHeB+SgT1fU+MfhXCNe

+U48KuSQ45IhrdyGx0kxVJfxWSXYPy5TCj2THAd2mYs58mH7HxGid+VilYFP8vedgxzmqnd7hYNTQ7ub

/Vo9quY2Wocx9Xdhyg5z7onmtQmwP2R+a/Kiuy4T5k
L1dK4/g8dFnG+ll0bOpZWpTkT41rih2QReJSoNiDQfppgDcdiRCUFgClDpOMpIjNny9NiYngaTKqmjvD

4Ct5pGlcIiN2EuLCxen24wsunX5hv9r7og0TvuXz3nqvdc2Egu373ZM648r/dRoY3ubd2V2N5swtVFQL

eJ/2Q6L+GAqayFG1BLUZDYCLXe8IoirdV1E0tZuPgs
XjYSgEHsL/N5mXSQ2TxDWEffdaD8jmhkQ5UiuuIQtbjdPf1Z7toBghVh2iGcBHIr+9dgvQaPTOXsbnuI

bPUy/jYnZ2F7AvWofD0f7YOt0d1n660xNthWh8UjSuuMuXEqTWNWIizLMvyAxExd6fduNi5lm/LuWes2

7eIppclgUy9E7sHqAfURYaIwDp6sH9p/ujhSY8To2s
9JXReM2Cjc52nYOh3KlBSWIvS2abNKY1EKTdUF6E2ghmtr0/0T2mPqTKQvCk5GvQ8hSXvBXPq9v8yQob

mgmuZ0cGtmJtlHWzIP/sVv5eoC9SYYQ2oZtX6ZGh+Civ/O7G85d1K1KMXopOoO+gm7dH2DT+ANt6hix9

IFsViUKXMapa6hWjR+3CGyPf+b8e2eWG63bt7UY2Ff
hG6M46VN3PSsEz1oZzk+MPJyuQbrp7XqMAp2MnTjmVtZ4BFEQIqw0YP91GhmQG/Dq1STLpneXYcAeMpE

tngtLjl6j/C5pXrVhycXBl5xQ+YJjr3sSzHM/4EomFnwqXyyF2pObS2Zz4wKEn2HTf7nZpcQGStn1lZW

I9jWB7C3Xj+KfG+X7DT2oKaD0p75yHq2HrRnB61TPV
L1IaQ1m3vzuF9VTEhQaamb6mzn/4IEOBkAB+bH+lpbeArZkU88bA9hJ9TWnpSxjLQf0eAXjdUVThUe5D

NLK8TVZ8VIAs5DMB0jm5v/ccIhCHlyjShKY/qxSzHHDMFd7F/ZYqkWDPiQ7QyIe8OnUbJkGzxjA+cxPu

0jiyDfKgQteBb6q+r85660OyLLSzrZ4Q2CQAqwD6uC
ozpTGJD+flDOns+7JT9wITa1SkubuiF72yVzPGrVay8kp9pwjTp47SaPMlVjGHpKZ1g+3mIKKmeieRdx

L2nemcVe/LyaIebtVrVWhVo4XcvhgtvyRztXSJNWc6pj2quzdD80AvwUsSle+aoVi+j+d+5/EuNmJ99U

Mrxs2NkWJM0sNmxgeJetk7/vfcWvfQW0uFnr3VPkQI
ZXArQY58F+nhmIPR+wdSRf6sRj1HhabagPHmkUQHOhFUSpO7dym0Ueu0KlcRXmKQTjIEU8JExtXRxq9Z

KocS+0SN4z3PhWFo5kKVFjrd5JU5U4hilB1FDndkGsvHjzl5iCxF5CRcBgYjN6HFh1hQmnKftiDIvIWu

jD579wKu265IcS+q4UGnkFCEIZdiDnvkHb3cc6GcXn
//0OLixl56tm85mdDiOjB1a/7qJnydmAGbyr/klsfQxQpiJKeIP85Y2kM4hddGByB1ictsAyAwTjDmVW

3FrLBhSgKr1wv4PIh98rQUGXTk6w2RNl3pDayXOBtwbo8k67qZrrnFURh0L5uHQ7+pm4LiZr6zxIcId+

ORq7cP02CFojSvh0xpFjO4X9uoTo5ln6DwzX3nLxIm
Cmh1J2CE+c9JBsSdalVwC2WNViItaIKuTibvZ+bxPtw3EsrJwsIe/bhvde+qAbkJ8qFzKda4ewMVG8aL

GwmH2tDAySKJhHLKhed37g/4Qomj2N/RlIjtcD6Nvr+0KvdwgWFLFFbX0Pj0yETQ2PYoG1tvEW7V6JPY

OhVn+uQvJgEKUYgoiwDkKe0XRTXxdJ/aQrtRLkwpix
qg8JOMgt8Pxw+ap2GjeNBegopWw/j9Pp/udV0lsuMAbG5aCdw8d5mgWT5uVpCyF5yNyTkqiohEeGYqH1

RySxPBd27F6BW3q+zzIcxtIG3Hj/d7YR2t+4kKmZzBaUOlfcm+y026LWOVo98IpTxSe8bdn1GnWCw5Pq

jY7wbOPExXfYErCw8b+n4ssp1kvtRmeTLsYfZE2cck
sbFCPcrTuoWL1zeclHR3vLhcYv5972gk11oCejlwm7bRadPQrlsvH3SpF2yT1t6ZHn+yLWrhRVq/f1Aw

d+qT1+mALRbfBda/xXCW+v1WXtwZgma5LgNFTcD+eZ0umAdMXcYhddP2RvZyvM9Zvztc2hCj0fhC8klQ

gkflD4/vejm35i+eFNbSb9h/0zWrcMMWJJwZ5hY2+8
Lc2aE/W+CPhg8+qLef9D5JSnL9n/IdEDnBm5+lj8z1kZXh/g7YxtTMKIYqnPg+SJNRl0zGKWkQxKGdXB

t1/vXeOfb4VmHB+mz9Vf/goOlzv+LxD5FjurY5rZYP4Rqo31XcPrF0q9scnFmDay4St/ibJnbX6xBpze

RIowHLo2JNYG0f/3QvAX90idLzzftHg0h9YZEOceOD
3aGYY8YVpOQkPFlhs02RsgnXrxsKCiZDMXTlgCDNso+aUJa/sMgalaVc2P03uIM1NSmfL+ldJiu3Ym/7

rDgJBPBis4kWpnkk7l1GBzatBaB2rtaCCNxaHLNdEPWcGhTky+EsvSeq/c40GbFsZ3Zv/PWT1/9dq799

FJaNZFGihvNFufZsOuN1WXSlY33LqaY+3dKXtx6vkr
3a1UbhcaRKNd/7xfXQmKItnjnF5k4dU4gYGIgY5o03LRHAx5cM//nHO1q/7PAK0zw/mlVAgiAaohzZ5K

UHXw/00NPSEvAcYqZ32YLoE0ijRz4PNZCbWvDlYTOSFdq7kzmJ8/X7d6QbKIS8jqY9KSYndxt7r2RMMZ

DBvu3VQU+albl/CtEByK47FN02OnxEmjZBW6J+7C5o
BKbK2tgfkYEp/eQMEpyQhATGZQ32bdYHmidGVJ8Uv/BHSKfAtRoekrz1/XT/KS1aVRHG8l7xfYcnYUZb

E1eJWh8VFrFkSs/kkD5mFbuvZExBMn4KguJDxqNGRTGKEkoX/q569cKSpYncIWKjgnv0RK5XMNEEWPWb

T5y/i2WmfNJph9MPUoYvyLz1B651EIgGk0gxrKxx4F
bo6+4e8+tt7yhAQHBTDgjORhNP7N4gdzyNWyuFcLyftUKo+zv+LEgbBscksMMCtFVTYl/wZ15coAWVh4

COKHvRZwmnyKSk45yGbNzSWEOvtk70AbcOS2sBDDTVgPbJqGIlOeKWP1pFuNOgBqwZMaRSebtiE3ixbO

kkXMIbCmlGf6Ew94g/tOWx+k9IsuFfahGl0MaaETtL
2oCNzwOQXrH7NDWenMybXzZD/oM/WhsvpdQ+BUzp+lI+I2OUpRklagGgsofBmdCPx3H+zjttEYlnrVxa

DERa3Ge3Uiio4NzFDUSYm3DrWyUzjX9edlXFHqBUC8C54/n7Da0+umOcH19m9WoB0QNrJ0V+F1b0cqdL

1jJuP5YTBKeTqdrY3M6ZHYelgi2QSe2Ye4BnFb3qVZ
2sanLa4rban052xRhTjZABf2VtWjM6p2+mkSXBvnV+BxPCgFnV5OAgJxCQEq+gAVQmLVGY0t5HCfG0Go

se3yUb/OU5beejoTBjkt/E0DdRRLFmLCHrljS9qJOAnFQLe8NGE1eNE1dyUwuG98snaS8kUuNc48Q3iZ

b212G4OioP87iqrGhhDT05sc1zRHUQ2OoU0ZqtAg14
Vq9+Gtb4+VxrO3Anc6j6s1c263DrB4bJCb9AosX9O6VeOHmo2j9+TM/nxCUyRH6+6HkUlJK976vhj4K9

jv63Qt0l7dnjJicvCcu/myv7c0oKv0P0FTnSg63ZhURhZ6uUwBg98QZywx8FU4Gb6m9R0GeACruRVx0E

q0379H1p5CEiKDpxohGLN6LojXLywoClElg+wa+vpW
2i7HJY++Tm7uwMhMpEClzapjBJ2KAneztZbnr899ify2sRV/1j8rrmfN9vHAcKmvTdQlN9kU59QOp8aR

Renvmq2NfTSWgQ8Bo3BOb3DIBN32bXqijX8RIgjFbq3ilAd+ntPcHVnOtrocc/5oshIUtaIKQ0jjB4Q9

kZ1huPWBA6L9EUAqW79EOAsCQyxDDSE8UPoyTKZdrT
1nNYHETwGNKtVcF3TXQLFbi+VQ2dLslzgflEFwR+BP0GEXmbtAxYmYPPhUwnsuqKlzUlnHErf7YwLaJA

UA2PN0q0cB5cc7W2F/eY6T+RiA1kQLifzZt0bVW/HXQ9hT3t3lmisj3AXK9Ae2WTtV0xt93YY1NSWN0H

XNQC0Tch4fhzQ3zALlvhAgGG4HAAcV2Kmb4SnDta7r
28GX/dl8B5sV/pIaN7cGE6X68p1P/J4i9WP3lZfv0BC05Dr8D0YvXd1rkZ2T2vwEo5CK/p4YQDbS0zQm

I+G6jWX01LIIwU5PajDdp5z8ghcnaLbWMxc9+qw5D8wdrZR33gRqw0pVqFfJCKQf/wQFJ6M6Pu1zNEnD

0DtX2WMD8h9i4FHSpqZIMcMsK7RefnnyNTMItyz75d
xinpPOEEKD0PQWUUyymotbqDifQe+g8O+s8hBop1aglV31Cyq33X2UTqPYpsGvKYPjZzUKl4QornLMtc

Huu2iGxeh6E/ANF+Vu5vjaVyxUIVXlSTOmzEqsic8gjz8qm/QLBhv42QYJIoOnJndC6ek6wUM/auB5kc

0uy6ZJ3j88VfS2Y7IrgqxOzb8n+ZO2bdZIoW21i6mp
dyT/JbNgssY/ERMko4U8BrfzrvOSTvzCgWLmOOg1Zh5amdmtxhCvm/vDf9iQ9/hY1h3R0w+eotgMQKJM

9Isnhu0hIk0rdGjsHEPfvtqrekcys75ytMaSHy0Wkf0Ag/Uiji8gB0M+/8L61m9/XO2PX5SWlCHhsThj

sczomypluG8fkEtfW1DGFPPAIbGbHw1IyEwdpSc/ms
SECyWy0HwIn77187ov6oMWwyfCdwLj1ZtuRYYT9C6vb9D2lBuP2UycK5raota3BK3QOIsoCx9JACpAaC

IsoHLT9imLYK9NC7NyX8kWGu7HBpOB34HCTNAPpnFjOP3j7qKgn4OUqT4Nd3MafS8jKj7enpeHdrvLxr

jQ+jj2J4jVkcesYikKoMszDcABh3d7Gl8QgrCnoExY
HeNV7cof6Siozu7zqEhuMH1krFoJDyQ8bBycL34OutJY2YOH2UUYju+LE2FOt555+50DzJ+4F5J4M/oO

yNnBHdhUladlOgbBv8Aafzb4zaJDzwKgnhIqJ8QkPycRtDvB8v5I3zgNUMZ/XX0OLsYbZM5TxQEDn3V6

+kZfChvXH7kmLWn7Am7MGJp8sq0JO8poShQI89JBaQ
yOEOPt4b8QOod60VG4YJJdorH2UXR4gnDzzRB2+la4mmCb/+17/DFoaZZdDYvHQ9w7Rj0pAu9TwbDhw8

PvqyvX1G6CZ/tQ5YmGnFahNbibAUvO8VZGv9Fib9TBnMbo/E3ygMBpHd75PhoiZOzCACp833sA0RfIZ6

Z1fH9Rrsko7BnTsZEmjAFs8q1xJgm/vC74KomlW5vs
Okgp0MPoU9ppTWiUIpxv3KEtUWCtoaJYSSO5xM+vCuebzF1WOQKpB7Z5irBsbAc52+Wmz+Pf0IO7WYUb

vIllcqij4Q0kKby6+Ti/Bpo2rbugnw3MuECoBtoGklXgQ7y5RWtl1qcVzyar93REAd58ZckrznLSefk/

9dk59pilHQznKpVqoxjRsIhDcoQPNeZOc1VsoIfcgp
94R9RUQJ0XxMpio5HqGqx0Yk0ptQva4gP8otr9OmLUmk1Ix9o8DfcgrncRWPuVWalBWwzh8EBuP9993V

1inzyqJzZcazUGYxtF3uKyuu/2yF1oo3g+wX4I5EPV7Q1zuewt+CjbAVUlPyAj042/10PdHtYsYrPEzv

5h1rlJMwAECfj7mMB6SxZWWuQ48Zx+3+vZLz+1icOs
KXk9F5jL6T+hcQwAt4hbiAmQQdmgyU2SX8AjeZh6yoPIbrpBb7wjxRkaH8M7T0/C0hbeV9HT91ZLb3V+

8isxRk1KoE7EBXJSH8oVlo+gYVui3wldRwwKquuaOXnR3UgT8gMX3CcP/VgNrqDvAe0ljF1UhZDSW7jr

YeJKXAvV72Vwtivw4yTcXBa/q/nrMOG5tIKygMTsQH
GHYLfQPnAWmkQBjzWt4ZUE4grX402hbzd1La/+E7Byctr4SGnrgr1TvjdSn4q7chuuN3iJGER3Um9ouN

z1Kqy2OOdsyEkXekVHofd7snS/vETGYFQ2s8eMDnCi57R0QPUpmBRqJ566bSK5B6duS04kk/LXZmqhuj

EGs2BPwSq0tQvP/k3ZKSpm62REsl1YAh0IAB0rsYR8
6vtU+eWMnTICkUE7CdIJ2n+cmXaeZ9fsWRwzZnJ6b88HaOlxmAUpA5vKvwzYHEk2inUq9y1+aGE/OJ1o

qiUY0wUrpynZ6Ev/bn9y1Qe77J8tyoSva5RHGf+IPLozUJMttvmhz1l3xJ35QP8EKOu8jhV78CjH4wYF

jGYWTVi/wlPU0OztzswBj2WEZ51E1aCLtCsQaCkfw8
LbCLeZKhBxL6+213d8cmWC8pH/Qp9UtuNvm9RaLIRu/MA5hvBQmWboxEvZqE5mKjfFlRdss8TwczL/7W

0NR8RphP381Lq86PoQBs5ruq3piro0nuMysangIa7+P62rw3JQbVyy6FfKDJ5JjXYl++H5Pn0stk6ttY

GXZTOnW99hl6S0jZZ6n0z5b5zx6tPU6JTy2Tc3RnEa
Gq9AjS6mYP/TwIPv05Cp9d7jYcEgrOLcMO+/w8rRuwW3I1WXFkYnGvxAq+p0srb9EUR2F7bxvy3T39A6

xSQYpSiNmo0J2OtF1SatIGr0r1g+WwrPohX0eWwQ9f7nLqYIbkDHKEfh6Tga1/pZr9EiKDXrF5iLvUNM

yazFxrLBqu0tYF9gqDGgjLN6K/HFhBr1z+o57trUEp
fMXRzJQ2RgPssFQWFxN7VtwadkIUbceJqsFIDABnL/+hwkmG2J/oYGcMHoZmBMCNz9WfQTlZMgOCXgEd

LBHUZAFEPB1rrE9kmOJadl9uBcxUXvILVUsJFPDwDdReUIaB03siT/V4cAnhFGALrLcU0k3wGQEiMibW

6QPhwIQh4Al3pRZoXM+E4H6FB2bOlA2Okv/Y20D5B8
kxBLHO7N2E7g+jS81Ljlo8d/Bkh3S8yvdszBYDyC/W6zGm0MNTbm86R/gBYRpJeE2zWrovt3ZCUHT1td

7EAbAaxbrgc4mUzwWgtCV1F+tPcmBkK7+5E+VSqvzhHi0/0PZf2coCD+FcZIR5kun0uxd0S4dRb2dHU8

AdXnf/Ctz7OqCBlTJQIOv2aTacI4U3sCo0yJ3ge3uI
BfEp46QCh2nn66D4W8Ndr+8rNxNmb/t5ouZk11Zzr55oUTajCa2HyAlLKUpuGgPjflrpHrxAblwc8xPj

7sycZwvmYii++mlviVX29u+N+Ep9Y+FCBoqhTk6us8t+t4Vh4vuIJFg1vhka05EgK0F1IEzt2L5KdhGm

0E4EOQ9B9e8o378Ogos26N6U1ZQaUI4Py9GXV5WjGq
bBChUe8MM6P5WsaLi7MHZIDnDxwvNfSa8FD66CUMxTZWenyL7nOiyrojDfbYD59c1fyUZ7XSUTgmh0Sd

2Gv3ZR5J8cucbtweJXC6G+uFIhNEW/NfWmEeygZ2wGePkaf7ekbnEkSEi0Dco5NCKdRu1kA5bWd5njA+

GVJxr+YQROepgmfMPaVlHcsyMleamkmRvju/iasH2q
sYBRvcIceHn+wgXXXOiZZYgNq0w3U54kHqNFNz8T8XrkoCEkUOgZkbUx73ZYLMgzefCfgRQiJtl20LJX

m8CVcIDcjZvk4679uPKxfDOdUsNdRQIt8OvK4JI1PyS6kusi2rfbWVTUxAVlleC5Ql0wAuyLAUfrmA4E

9lBPFG851ewyS2BJ6uOceM01P6WeOI1eIQNmWHOvBx
DhgWTLEECjajAZXdasIPC4TVM1/rTSU9a/AqgPTPapp20xBTcaubZKncHwWSDgSqd0Lknkgn+cSskAdp

29rYOpdEbognrfJPXXuwyKmH1h+pz1yVu27PWzy+sotDFtt3fbtl+NqFNcBZrwh2wbjmTVpUvgU4qHlC

GWCvkIU2O74zQm9mpXNb7AoEIM28+Z+a70gNe9mAA1
ImocejgYQLS4uBCF7Dk99/MWj6PQNoBQLp7jJLSYE2EzelOllq4gCRhBoU5k1OpVER14we1KlqYxSKo9

0kD6t7u2Qj/R4RYlkEjlUaoEfu26SLXdBue6NQh83Ptl8ZYx/MfonyhFZfeVEnre7wzY8HO7+An2ddXK

bM52j8GU1PNsBnfTBqMg3QLawKd7i9n+ITxHkbwICj
bHQUY4McxPZxyAWesbWSJk3LZnQOyE4JgJh60w7Fitn2FYU8jLsdQmI5M04eJu5jWdzQIlrXptbIgIA8

wLPmdj3uvb96jCkXeKIHEEqLzQ5LN3fKyGNTvuBpwirLLi8tEHqG2gRClMYrMjTh+NfKxv4a+HP8+nfk

0WS1u+/VPLp3nU3SPheOeOIYj6TNmm3U0mAdJaB+mO
v+/d9aOhwhKJDYjvFk1rNTkuuObxW91BdphrUd4vAwX4Hts61nAzNavMv//bqKArqLwVicfXDovTKFFI

EGSFaqWUK6o00GfJAm7YaBs8CUgKMx7Um2mTox/jnu2tYAYqLBpA/UTZdRm5k5NIeMULqBw7p9R6gaFc

ZxFPGmrJO3+gnhPWl3fMdW3OjDo58TKruzuxfYczIF
x3ol08kiWAZpOV2NoLZwhoQcLA/v780VA9hS5rCw299tp/Aa49v2cDBNX6xRYnlEbB7bu0R+kNHkF4k3

TEnSt1jFxYg9YR/CKQuic1MH4njwQABQW83LBD+/Oie3UOf2XrIfkUbE3lkaoFKQsrlUa64VKOlykABK

amWtFjneYHKnonIqd822Bq3K6rkil2hSpK1cKnN7Fa
Dg0qjz1RilLqAb/xqUomsdQ0cl8JjGJ5t94Fhe31inTJIZttJ6yYAevaHTcZKkB5N5z0Uhbfrhg0nWqy

o6BDT8rYRnuI3Ew4DcTYaRFK2kAfDhSWy7FIa+7VGmLKHduEsOpVrF9RSwHnbeKO538cdwRz53d8Pyso

c5mRJhiF+zzRagr59MpYel3qcqEJhU2LTag/I57xEg
g0HWACpgTE8e0MLN2h8dsPNQf58yjMalOxI/YxnwuFL5hSNGX3dUx9fRU3AyaLoB34RSsnNChpCAtkCD

B6i2I2Z76f8yulZIkzaI8UugzBHPzu+8GUYgWWp4Vi2TkOKW1160O4f+3WoRWu+UKYuXgpBKjlcWPyGF

XyjZtRtT3RqxQx5IRPeHnHWh+PZK5XMl5x6stXWf6p
4kn0e37T797M+Y9ysUoiEqk+1pv/SIkcLc3e771mB4bgraXKGBYnh7BLROXlBkv3oyt5ytYtgHEmz8Ch

w7IayD6xKD9urLuMf5VVypr57qdquU7m/BZ30fO0SaeePbLYPSIRcTgMxcR/WKfXkNTdJf4wXLoUzUSj

7IfLq+ge7ob4vd/vySGNPnv3Mdi/ybLnenROxmqfNk
03+d/9wdj7buyD0huqbOUr0EtXXp53j/K7E3T7As32JWa6a3GaeXwg1UZ1IJrwslW+ehJIMWi/uswS7o

C0p0hfQAVr/LqUOHDltJ6ZjtUWeOpgOkoPl23mMf0BEzrjrjXcCJQ2MRwfg8EnlUWHyNYg41O1gUv9Qj

a9zblngfAR4faRSARQhuwdv+7A41DEU71pLYLICKqY
3A0hX7Tq9kN3Ocjonhf+5wLIrfcazYeu5loq0TU7sB1IoreTTplCk9IkW+ONfhnHo+jaxNd/hoRiDbBs

8DxAAI/U3tRU5xSBGPamjDO6cg1ylVtryZulxQznbOLm9Zjw1sEI896hyUqi29uFvExq46d741AHivW1

KhW1ZZ07iMYAWnMg58BmZ4zYY+usj1vJZT/5kNHleY
PdTTvUOSUobcN1y1O/Y5K7FHLod3JD9jdTCp1Ck81lfdoLpDhXyui2LebC/BaiTnBJmhlPFuAd0bsdOT

/Z8Z8DgldM4EybszLDuEZOYJFoqnzxyKZNPh7xToFERxzeXGTcfYAZzE+8iydgYH64f1bYsv2wGCl5hk

s0pgAAh9Kk8+IWEOlxemE/G6xEMGLJ1N1wmOyOdPN2
d/Eo+0LqTl7m1ojbgkq3f6ysmsFJ1oGnLm6xe5dp7iIwLsQqVQuOIZD+3xueDi4MoI6/JJXY5CdOwcao

Gb4GtAt/qMBlhGDb+gV22ipqD/6+TXvds1KA71dtmpZ7MeoA//VTP+vKkffbRdTxXaox40TLWJPEEfX6

II4JVWZ4bFM63A9Y2058+DkRTudBeoj8UBLB/WlgW/
FVA1tpfVdp3Isr0HSt9tjGoi+uHDutQ7zSksfhyKcu6s3Brj1WWzTck7QXYQarOO+iUrJA5T860rFWjC

7T6ug7WQ5o9HJCUJgDoH+BnvbrVaRJnUozdp3rrpL+8Y2m2d1G8ZaY41LZFdhbddGL6FxqmzAhBXEpbq

aMyPavfjmFmT+KWJUaCgQdAjk/bPOkaaec4A97RfoG
1ft+S4+pKlANHs/ak/k8bL0WbdjUwu/Yo8Yq/k1iqFhUzyncv3lNUZ4vKw0fMCpqBYDbvoMJpFhaN2jI

quxnwjHykanGFMcZNcgnfUWohTMS38Ryp+0K5Mz8Y/P9Ze8idsDqhlDnRzCry6Ei8rul6RSNNmIqsL81

DC1gluWQsEDrc5Wo11znw1CP+6ddKvfi+IkosaJfbR
Cmf/3iX8qrPS6qTpu/s/gvVPWv+aoHpYxDYzdOUeHn2hDAVMWpqf9pA+saprjqlW8x1UU6b0Uk/X50rN

h+SWoNecG+JpIBGOPvKWEeWbR0MwysH8m/qeesu0fl4A6pnp+Rs7a1L8HWMxIS3U9vh4UMj+61tfsUBY

qEn1R7Qy1GbOzWpTU8IfT1zRHU1/YpsFxiSeGrT56e
KUzoM8BaNrMEIepZJL05aJj0aNjQ1BGEqmogw+vD94r4lm6cJjNO6D45tw5NLox2OQO3mI6XHBoJaakt

pxwBPqXikdUBLbwkJ1DaRiGS3nJudvIXouQEKS24yWcwrZKj/gMcN29YsV9sQPGbUabL1c3NVMKvfDKY

Za4aaVHY+0DVrwO9sPOr6+7DUkSJkbaqs4RzAq9ven
073HgvGRvGx7c/X0BEKm6aaQc4fWLr24yYKWlaU1Tfh48PjEFz3Sr+ClGKIrCPrQ8KcxNv2ss+tP+Fqt

fGzRPVas4fNZaR31aAA6GhGG7GQDKK6U8X2GOD1fNJ1mPK6Kb5GVKtdCtqMTMti6Yfzw8xsaM/NPZFcr

XaTtBdhMv9LDfgGhsuLGNxAVkT48XwXyxlwtTjxMls
vtHVlblEvmw84jPVpBVFnHe8UqgdyUrsxFNbFef2p6usGGYSkWGPkdC8oNy7TXISdMXyV4pbSCko3o/S

KNoXa50B5vbzg3QqNN9Ilfo/dmBAbZ5ecbEQmzH2qay3Jpz3cDMXCULzfQhQmdX2/6U/sJGS08EtsFjg

LDcZQUd7lGaA8JrQyqOM5l/QuSSQ3NLnJ25oupdAlP
iS1gx7JqY6RnJPSLn1++iZ/RNS6sfK2+cPqxODE9qTWwb4ALWunO6E8rToNIQkiGRADnXMuLflCcDDUa

fQ4GVo3PvJl01Z1yQAPF6KwfWeMCM6nxUU3/swjgZOh8BU+MVGblDde4vUgih3oShQ2GzO9CIpwWi0TQ

f3zdosEU6P9rU64ueyAb2e0zdO41V/gdSuc8et2q9P
vfCoukrp59xwwmQcrgWIVY11wHqejP7C10HTnIct8ygkNJ2jbgeyGRitzMTf7f020qqh0U+yV5Zxewel

WL17wHiOA5yUl4zhu3oRf4Z57/7rFXbTKPmZKfTg1ZrCpOLgt81i2wtM9iyNnCSTXI+O8lE9/3yE4VMg

s/eLq+SU5hYOnxPBWIK7IRCsXsf7J4bgomeK7F4cy9
9c7x+ClvwLBayWELb4XEAEYpl6EqjQmkQBX4AmWzyb+h1O1Ye4YFYTJeG4bhU8ajcR61hTaZBbHpTap6

6fBRE7FXwMeq7CrF6zFafliER2eTlgltYRbTnM1h06btH0qHR9zM15vZ9owpBi4E4tTmSlDIk7aC51WI

demp/Tok6GcFllV2c5W4OO+KaBv3hiQ9tz1hV6LSgw
CCGVYVJieJeZZp1HNxVrbobfeTd/q1duBs9XbGjntiK5BBMe+14uoUd14mFIEaD8JX47oyaXZ+uyUi2+

1ZQhBMCOeZwFgi40B5zoOB0GujP5cybt+B0FONGaXEM48zT0GcpsSrLDymvONS6zpAYupMNd3T8XUhFI

Mw3Zbd1giUT/OhQIbuQaV4/D9qKHpkgf0eQW5H4Zmm
ufPvrVzRYnhnyta0K39YkOb3HROE1onnDx2cRf9AUL22/5Nd09CwkErtW506NCJtZ/3YzSyib25V91G2

NKpJ/37DtH1xlOhuYsSClNIIiixMM287TYl8GhLtBEkXhXkUR3Dx+gx9BB/JH31lQbM2nEP7fdWN9xu3

TH4vqqf3djCwBPTXvayW+cg36bIiWJK367Rb5BCphN
7cfDNSoalwVTonJ2Q4rHgNJXqCnWaDax901hrdYUXU4jP8ACS9gRkcvgYtIW0qBzaihrT5hX4Ifk2pHM

3FnKqTFtTnm1owpltV/mE/+o6MBLGBJbr2HZg2CrWXuZzeFkyJY6b5As13kmerMR6SKsot+mrCnK2pv8

JX4qrdcNxJkQObaCoNeFv0G039mLCCMr2h1b7s23fw
MOPI4hWW/kQ9J6gvsvfdfNcI0t6A0AL4KrawM3B3iFjONb7Cs7D7921pWuDHGGss/VVh1GTQk9uKI6PY

ZxkqaGuow/m5Xo89ScbrCxU7eQmrMKVn3dbBL3uwzmiPXWmnAy5C6Gv4/wB3bBufxWuaZLTS3HjR0+vb

eRS8JWaDmf5NB8Oodp5tGdsX1pNBoQBC13gtNqaiY/
2I7owu9JPgBHTFZ+kPzMY1KAM9feUy1s+9vy+QVODv/xiKYkHVX3wQHoXhvIBdjfEPjKT4kuZj90219U

TCYuAiZU/fwAH7n2XppPWtPVMrAZaTkaFeam6jPJ/U4gaWU/tbXlLs+zkjwP9hhUkZCwlG+Tnb45fDv5

4UQtm6Wkw+wMkKQktE8zTjA1enlfKBym3GpakPCoVa
P5VU7KWyHZaW3EF3/v6XW6G9ID5M0vubvyEcsiqm9QDsz6W/v+qFs4HG7n5f0FSzHk5PD6v+HeHB4h1K

IfwefMlPyLgwUAFhYy5uk7sawAWEP0lwBn0x2Q+GhbglrUBD8NSYg8biD5u7ugtcHn3oOoY8tb8cMXp/

ez3Lpc4Vk3HFmCmkd2cbbLFw6ahEd4nI1i3C3rjLH1
bDtMKXwQmUN8c+mcGkihbHqIbVwhEnAX4bRy5wrNKJi/WhIMR982lEKWoaX0Vc4fuyhT6/gzk1lCiTON

1jeIrxlUlXAxYU1/bn8ebJ331xHxhdqqrp1z0xTKfqt0CXKYUYWyOEOjMm1Cjsr8o2qkvFt1pZqefwT7

F+nGSz1vpxjtoMeEYXXGnTJaDwM0nwPhq68amhgaT2
rKesQfQcinGr7ZWfu02h26SYBx+NMw1OMuGDiILiLkjDDZXsWmROIveUoxAbAgWtK5zPmdNCiCxWoiyS

T8Q7sGtv80PxNU8oh/0W5Iadhn82kEVCIkSva7NfKARR7x/8HkQnh+C19sq0SK9hFlhjyNRgtojy1TiZ

LyGWL+qibox/YYKRvSyPaoUwqp8xwN/6bxxsGQ5i09
WbCGfUCzdPChITgLy4/s2+ajnpulAY5/132XFifBnJPLLF4rHqoDLIuio+pChiKwpyaiNs7lGinzJmeg

/iq1BmfET8YTbRAx6SoUjhXVVpkge9I05Zr6nEd11218MH/1TWnttXPabMeDjA15OEoXM9MxcnJoqCn/

04lECpFGF4RTkYG/c7qPrlxfWQy++TaMTiRpNZwcPI
E7bvc4iRKpqak9F4Khe/t0/FV3v+drqpEQNUz/lqP6h5JmabEPJ4Nv6dGYUCg7PGs+oJvf4m384S5jKv

F/G4EwKQmAfHG3HEEFKHkYA13v5Ae9Nxvt/EbQp7LXUKKIReILtYeImYxFZSQkTygo7fRF/KwwkulD4o

Fbd+eZyiEgKWjJT0xmd1Hu/2peOyjO+PEDjXk9NAy6
tkSKs9kZxvPzEdsv/yk9zvRIN9fL2b4Jc6HuIBP5X0K5PteZRhtLE1JaWpnSM8/w9ZdFK+Lu7mwI81NJ

NwJulwtlYBCMqNWRbtyBlZB0v+U2zqiM5DmIuCvBylRlAhIkN8mctCr1ExvK93exq0HgDy7hSz6p2D72

e9hNxID2+hK5mHIbhK5MTFOvC5L2LU7dDS3NT6qXYe
hR1SDRNzpJIXA8zZt9XhRzp524cgjaToSUWnDAP+i5L/k8hX7fiZpUdn56G5VIukaeMIE64ZFbaUbP1j

Vz6VD5ZwBhYguWYg2ft+KqIvvJ3EVDsgnp2mMWTU+PE6f9FtWpNUPIRsjLFq7/Q1bvYCld47EvRU8MoK

uUj5p2BMzKUeDtJa/HDvtcDoC6yy3OiGF54+c2kf+l
Qv4oqOMfV55Td5FEf3+Q1KyokkgQmhMoN17iJ2nUlEEBV1zBK8yEo61c2FmuHGuRfgu/argzN+eHU+k5

qBIXXwfAk8y8K7TicqvUzT7iJq+ZGz2mozpVlCLbnlAMbzkFKcwa7Dys8R2nj6FcKC8KUp/VuZBdCXMJ

0gpuecUbCi+iUsUJIFaf5d531T3fD8Ah8puOa6w7J2
2F1s/zwRrVuOaFxsJm9L4Fa15UFtgGTka/DrqEjlsS2lmsdRCd8z9W/KfX6aJkyoOjEdjssa26k4rD2r

f0Cj6ORmCPG1Jh9WgOLIQW4/oep6Q2dOQYdTQU/Rn3R/+Jw7HHcVQucr7qVhCjOZLX2OiNFBOyVYyES2

rBUNkx9VuplWUSelkmtr640wQRFZ6fssFqoYJcQcGP
p/4qlcwGSDfQG3hJtJc7gbEESzNauNowiEc95nLG2O5poJ1wVYRdYLW+Ja9gpy+klJQo9vfvuOTV6eYq

GsG+rNoUG72T8zOkfnpuiMwDgAD/UW3bdtAH1CixZXdxzLRgw9/sesZJBUbRBnmS87mRaHlNxolp8aGQ

OvHXhVw6vtytJnHIDZn3BG0bX/78Q+SktHGv8hTaoN
YhsPAyAfEin7Y41A4/IPNUlJI8tVkoZ2F9jlrCAOgbRKmkRJt4lRzr3eZ3A0ZJguio34cfqxBH335UbP

ortN4xWiVjIyZx2FpMsHWLuggPhPB1Tc3mbjK3AjIGUo3TFR/7I/TIXVFzyxfvGYy2fDQ05x3ytfP7Bj

5K7/C8ACsrKDwjRUAIvsDxKS/t2CmlZltreDW7lHpC
hIvF9AtLmu7vNbmH8BsHaREOcmCWlCGX2iC7xjB/GRWt9b1SU6YZeFvJidubBypDgwaiurA3/eOH9rWw

FZqTtqB/jTgunDnlNPGMDxa/fWEd/EUfvcIgwahFw4LFegvo11o2+DV+5Gg/qo2MfLy5Eo86CpKnw9Bw

Ydnsbi8IdpVD6Qpz9G67pOxWABJG8FsY5uFRPNTvjb
tquEpcsATP4S5FWo22Sb9sqQDSG+CYankqbtC5990zeTJsb3XDiSwMStMAuTYY90eb0l/tDVhv5/imsw

A2yFQgU+zZoYOzoSEJOqr41ZEihUsqaEhlk6w4GEo7cKXaUvNbxgi97pR+sST0K9uPOUMF4KnouJrFBZ

Ylt57jfA7WjqMD68SEHw3wdeVElY09FJAolRF6Ujxf
91Ylz4O7NhpykXtIFFxgZyOR4qrzLnN3Gx7knPXJaCLSKfLkbJ094eHgUK/Hc60ZKnkulWfNMp3p+PL3

iQZnkC+nFG57XDEEUeTcztNoZ/2V1hn3tjyvNIZX619kBBK4dEPoVWo13fY0EtQjV3txY9CRTChQs6RT

c2LGoyqYN7k8R5NWsrxl8AZw6AHAUVWwXzB1b6RlD8
mO9P+BbI4xF99pzM+O3DZmBLsNfmuJ+/nQKTjuo8a0W8z5PdWwEP1uqSKjK4rnE+YX6uXlhE1j7N/NQi

Iq8KvcAmqNbYmdppNoLEiWJYFa+xvCUuwf7BKxs51ejvnSwJsixhb9DEOkJqI4F6qLR/Rqen040QluoL

ZXh+RDXvtXHZi+olT375xQnxJPVuYx0MIaFbygflty
81hTBShweZeJ7Y8q7bpGB/aRYhGeV4TzTiKijsQSLOAPRM1DPfBumban53BS1pIN9bxvrjDYLwadU0c4

SV+sDQ9j5PCEwcEZoJSKY/baLKZSjdoruCKtKXOzJMsNykh2zk+UL1pG071Bck1RrcVMMPlKROw7SWjT

yd5ztJcDGi2nH/nhajufXxLz/AtCD/mBItDYSuWy01
bO0jDxS5uJX41EnyNoXyrit36AluIkih2yDPntdKBg+0H0otpa9RanC0K5JOKF4knzOI+k0BvMp+/A8h

mHhWE4naTxR5Xhe8l8rpz1y4tnJ8CBiPdKqTRkO/6lzAgK1BH8NjbcgsU7CnuE2MWM141us94QBRxan+

7yqzqCs/ebFTOikuxNe7hpI5f32itsoQbJVBJbpibS
s2h5uiS2Sn9A8ITOEFYib+/Ezf2KIjPK7WC5KmV6FbXB180vb3vCK+9oFxQBfMdXiczi+VfiHsfRcWk8

xLbATSy6yOqY81EFI0UZKyWZ2Xt2QX2EcDDJyQbzPtE7DQr1AtquoJw4oJymNk3vPnC/sHidYlOfP4hl

tz7plli1Q08n+hBLfFW9qfZUJM9vRfoOWz4Ps5nGMB
aMTe+OXoi6BJAnVcC3qFruelNtbf0aq3N6wizVWV8/NjJ32yKaB7HwH/gyTaD0eh6BGLFZFgqBZnRFfi

6cij2pHsFtQDiPSLYdJjcYBLBwRshHWGhcWsJpLCAwxWLjTUctDMIJU/OTqsCRz2+GpkW/4fZj8AoU+G

6SqHzU+nVI0x7qIpvxFvoIlpXkQ1GsjcGowVFWYugJ
+OK18YHOiif08VQBMuvDHsZtHfjakJcOn5wK2wdQXq9497I2i8x8TOTqRxrfFv7qJDDjR4YLkxZRWzQc

eYkrUTaKjI2WKyD7MG+i6XlB7csI2jANKim46iN2iKuhUEaXbHUes7Ly1Erx8zzLHwRho91DNGKBnYSy

f7HbcHXOG/+Twkv0e2wV9eL6fwhbtZI84dzCYiw74N
ik+do9Kq81ZPUchlLhlrBcz0Hmdambm/EgUD5b/ssW8q8xMR6d/rVepQ8CF9PMEK0Z1gTklOLV6OlAyq

AsXmYVJjPIkM+DkNo3Gu6TkqqTBv/5I4f+letuwlnwOtktPvMMu3a93zxs7UBkdArlSsbhirW/JzYGOw

8uH+zxUHiteJa12p1GDG8fQkle1CXegzv7bSqW0X0p
mgcpDplGZHg+ndu5NvmYWPlQymOwX1pQvP3glTlp3iRlHIMUfIgA6O8BkFyzoHqBvo2HpD1mDJkxs8Iv

QCrV0T8mPd+3+P+SODfUTeR2sa5oh+pZfKXQV9rnqX6I6tFVa4p9wsdGNzXz3X/cXUxO5e2wbkO0Vvso

cPETAjcD0JmwbT0lLlVG777et62ZaAd/DMAN/Iv7rF
VJlMPXUYebyMnW9VMpXWaqUvYa6xnE/eyvmpTL2O7PWWiHZGf+1XHOrv6PRDMk40vEY6rJEcFODEZuPp

qj1J04j6bmwIiPndfd/qmTnmFCxm3FM1u/T8zNv+JaT02d6ah2tuCWgCMr/+KLqbLhNXvnb4ZNHB61d5

aWc1qoWcOiXrqhLaBrPB4BAPB0H11KRXhQpa3n0XmU
lyAZYXUs/qBb5vJq+kbRNb5QPGC9Nv9LgWu6eJ8Pc0FdRcmE32/fTyIkdoYh7zVsC8i4NPx03uROOhhu

Hr2X4exZg+BQJeaNuxsoewUq+RqGZrmFYBRWJVvi13PgfUW/qGkRad46il8fbfykisNfsJjlHA4UXHln

wo1+9MsNPfks2gt7xTgl0Gi21rxnEiZqIt9xEL97W5
u6s+eUQWn/TCcS2aMQ/ZZ/L/m6ElAAstLPu+YjQQ8mth70BKip/fbZ+lkYswoYJeUqWx1f0qo7i1pYwA

0x7KxIfSS6Po/FVYWp1RWfyb3YoYmz/gAxwnLMgR1s37L18ac2nSX3sC8iy2DxEBJotgLgZB2y7WU6II

Fo4fNuOhjlQT/tNY6HawdjcGEKfSu0qbJwqaMrOk+0
4aWnnPn7LeL1KHkSfnha7rFkNHF84jZMEB3J+XuTapzHFbNoqy5+Hz378YlsgvGVngUErzUFxyHq/Ocm

aLHb0E2xzVvFezoV9dnVgra1nkc031mEvZVUWB0/gAo49dOydSFUK/PU1owZK+2LOPEoebhcfaJXP0cJ

9SBHR75mGnFiTFqW0CVRp5b3y/RHyU8CeMwQKQRAwC
Mk2k0DVkdTzdSbVPWIQa6AIcl+f0il3XR9QJOmYRv96AQu/k3SkFTk9sPraIuwmzjr7UfTsi6SNtQZu9

NzR+aR7RnO2TkbaQPKMTBsScSVMQ+1u7AK1mxUOh0wFNJz4ovhOMn/hJPzqQusuGmhjvL8uBc0oORTgl

NiJsACkrwOO5zhLtmzXnk4asK39+QXxtKxtJXYDqxY
BAZehpimWSo+QvnzPU3w8dw5Yf8IZApl+8wZ8IkssQC2Z8PNp07GWWo/U/oIZAQw7ivy9VF/0wbj1w7O

njTkqDGbzZZe/T/16rau8rwnW0mCCxfR7Q9s0I7yTPd7MJ6aIZZBbf4ZXZ9KWQElvpAt6M2UfJudwvpo

0XJ12NT7889Wp+5a37riL3LR6Lva2pI2a44pSdl9dp
6B3zyy4iR//PzJ90soRSaAjT8EqVULRCkR2Pd421aoCQU3ljpb0L/UxvufDTChT3MzUyoq4sfQR/gRND

Cve8BL2awqaUVCabSUzffzGQ9br6EcEZ0EhYA0IHJWGHRXxCueubmS5Id4uxamS0tY0le+RIme65mUGk

wSwfNBVDtVahwYVLZRu7N4kh9z9gSvl4UgSHSXZalH
ASd4Wk42CR+ESWhb0un4tdTQU7RMjd5VR9Ghi+qKZUCW4l8reYGiMIY8NSbVqiB2B9FCO3Z1OCXzNFcX

XxPcB/Hy3pqB4fIlvXFbfWhpcF3kNvYhnJdsXMUK43txxVU36r7lZJ7AvZX4AUnex4c2ioM2koletakY

8KNY/+6nG0T0lTDWg2Dl4bWeNgBoA5KdExq5Iugm37
bMu/j00+oG51wj0POkuUFAtVq4FDfO+6ed0UxzURfusnsYCYgJ61xVN3OY4aitHpddBLZC5dURYdVTpl

g/z0vdjkNf/vBEvwmnGgWn2j9C4FSyhZ8bXcdpHvB/eHxFtCc7rXY6+UHlm7VWyxBVHX2CUUQKG82NFi

gtKFM0YnT7D/A7q8EZ+rQ9hWQE7sJbkkDIVWt0dhMf
gGVW15zra6Iod5x5oy5xXmL0AGlF/Snir5pWB91Zu+1YeMzaoMJrfS0zOluvPmqPKjTOCSIiA+vpWYA2

4z8o+YgQh9zyg37mIvTznDpcys1nrDAbxc9on++d3+xqvcPtJsxhC8cUidkqt02plNfzc5aBWn2JkAxH

UMCGPOmoradLWkyAow+IOlbGpjHVZl+xTOD+51wycK
tHlEu0g6jCWwNhp+UfWpJFGKQqfDaOd4tZA4iA18bkNTk+Dzu0zLr/Gt691y2dh9zdixxjrrgf+F0Rgf

woOoNLjnK/Skv822mAq6SQ7eqpjqTkqE94en8CjU0mn0L8xRDyHNBb5b9KLzpRnYd5/MlSUTID/CnNOd

3usvnQ2gs1t0oQLoxjEov+vqXVPLOq/H1hmpB4F+abhishek
S7yoVYMo/MKAb+jh/MAeuKsDmEEgRw3DNyLp33qp7boAGB2lOBWH5xr6OEyXcI9q2MRjL5twjp3i27Vq

ZdaADhTEJvIuiuZ7nIOHr+vNP2yjdvFSYi0MIbMyFtZMlERHNKSrXXHyxLvmnqvMm7CJAfKVfJC6NEU6

YObcF/gb567RzQ2zM4r/U7qj9+Kjpx8gHvVxFqrxfs
aiogzzKhRx/+Lb+4pVLKFkZzqUvTgMSsyq/wqcKO2P2yyeYRITJSt6dDP9u6AEm2rH7fpepAywbgpQ02

UVzga4M3yynmM5jh+M1D2kdp7l6ttB6e8pxoCSqz82SKeBagKHOk3O/NCODJ1+8cwm8xPmLUL747atP/

5Ln22cVaHdWtJ0tdX/tTieHUUSFuY+kb1kOC5wjq6c
eN9zEvTrBhdj8kjinNvl08gO+BuFXI6HYRdhazdzjDKRl9tGYVeMBQ6K582OUJl3KlOGS5uFjr2/a4ay

bIFwzl6a3hK+lbTCaghjNsNLlLMXUgiSm3e+jSEvyenHrD5BV7qZWmnp2Ta44bbfbSoXJbizHoBDx/v8

BgUX2889uB5kXdtTRh5Kxk4P2kBcLu0M42QjTaXIMP
pjqDEzAanNJg0N1Lv37retBuC9XjYi0lvnzGYPCDhPJHVejtWDYlzE9bVU7jj8bA1ZqA/8H3mcykTNdx

Y/il2bvsn6enwvQOQcxf3JlTIJHrn0Xpw1HwxwZrdPFPET8DHhgRIMUjxzpyWnq+G8ZK/zqwkvMc6Ms5

oe1macWKam5IBi+uvJeNJHwbi2TgcBw6jGJaTMyN2T
chkPVsazCy7UgMPYgzNantb/0WA9V1L4yfvt+8rlmJMsTSi6CaREEJvXaoj4u9Qr/czJ9B6vmNS8hwK0

qWhi90oR9mksaY7hCyngpTk+dnEYIAc7lcwKKjrBmFtgRoLqVahFwNbAnw4cpyqKAPvnK8eY7m5bZER6

/vXHHaW0pDLB1eMQHOTpGxe45dPBEMpjSMZRIZySUb
SpN6lfvSxDTQrGnhlfoPNBM9E0L1xgNnNmmDS7RoqbVl8pL6dCsK2hJNIzyr1rhSLTBSxMnDxplWjjAi

jH9h9mZWq3f6apUujIbNTsx7HYcrEUQfP3meY+d8xPhidUYbOdWT5LOET5D3kesOVsCd5ZM9TJjm7As4

2w5YQem7YGmSycH32IcTSVqHMfrOQW2LDu5/dr1ayN
8cG7YK6Ex6hARHJifnkgfkr4wJ1SOO745aJwGVFvhI+WkDtHCqKs6CQux5UquZdeyJciv3msCtFiP4Ej

IaT3WCdZDzC+l+tjYuSmFkPFt0Xok+n1xA4ohTFIfTdd4NE9gW3fjoFPgOn7HjYU+rrXqnwBpwxb6kSo

yIk98+3IYvZVDjbIgBvOyLBz+7Y3wELVqezFrwer7H
EyablyipZhLlFsmBdcyeGFj36BF6nqnJKfo7+XEph7DXL1UZUCok5H4d9gFjTmDAVomJw0Tk8obrQjSA

iBEZt4upwfrC9kD+9c+bTvsnRQOMzWltAj749ryjJtKyhxFA9w3UI7T8wwuBL3LgKfmoJjerY5eLQL6j

/rs9zwPNJcF2HwYgkzRLFo0tz+et6BABanBpqSIVd/
EAQCZcWodMaMcemUM8MPzZa2yc36shcJd/Fcvvas7T8Cn11zIW7Sk4q428BaW0ZmgQUEKvaDj2G3q7lI

WNjQmH/9AEqdQs7rHgNCfcYR5phbzYAGhCYfrVRMC/md1GDBMahWr6ivbNP8atsV1YS1hBBCi3PHssYH

54Kq4dheY/UEa/S+zdKpOfsQcvU3zzCAWsJrtJBsbx
OOq0hbOD5oaz+I4OImvY/Pg5IjdQ3jOJ/5mq6+dJ8iQNWtYJZ3Yk0homnva2/S9rAdouoVWo0DgDemVn

e9crm/TEp/CwUtrMPxJ5sYrbdB5rjhHX6wn7+PlM6VftFQ89YwCFaHjpaOCLkhKUu4iezazhs+qbabyV

2YbBSV98OiIpeCTuT+TxDuAuLqSX+0skxB6ZRphaib
WK9VPZ+c1xhvlE+YCVbAZdktJCoZ0xSjF4OQCpm48+qQ66Hd2skkaaI3DLfavn0VvGa1b5ftyGBqScsE

s33FM5i4GlGHirHZW6q/h5uujYm6NqU+QsNU0BLYbu/VObOfy3q/LeZFUu0ZSDnlPZBg0CiVoW5RfLbO

vaAZVggDMxwg33P+BJTs6Zv0zawUht8DPUQpmEKao8
pdQBUEqwKun3JW8Ws04OMyw5b4Pr2s8dubtfe7mP+K8aLd0vl1T6ZpHJ2Z6AnOiwecIvMc8zyMa+Nvj3

FDQdsUm2rMhtuvDDr1jHNu3rme8xOR+1A0sPH4vA7C8swRw9KX8i5n1vhNx9xhhyr74+yZM0+fasqrKs

38H3Y9sQbhqRs5lUk1v9A+oZx4yxYsVomdFGcsH4I8
0lFH+Z+nTG17uIGE1d+ZIhfUQjUTpKOpTTlblt1amXHUIFNAjxPeez56xiPBz3AzDATwcr4+UyajwYpb

+5Jj2U84g5DuPVgjP1e2hSRFeudyaW3AVfCeLtAUvBTg1EXDDekCp8XOV1sxRxO2bnn9fn+Xk9An9/S+

jTmK7FK0ZMIer/l0Wn4cEWJ5vOe20NWsH4gRr+SJ/S
A5YFVVbWVNKExRrE1wvMUd43gpFlbbJW2V5ciDEvDWRWy21zNPD2PwC//A2K/vDLKoSIvO0EUvVvDjWn

lhXqqAGA+fDkMpPY7Ccy1PU/Dz1Vfe4/d97OpxgWWSmqYdPpeFAP+VGiKRoESDsoMnqoQoM8Ek729x8+

uFetFKmGAK5tP+JUE0R9tu5sIentqFDz+WymSi9+07
e1Wj/bkWnan1vr8QCFEYd9Jj4uQkf+KoLeaOZeOMsXsylPIDYeQLgShDT2ZFCFxnGve2G/vKzbLKm+Pv

4/5bTMPjnDxXtDd9fkdBU/q+kaPbTIEXDzXaDqOeYbBjzFi0rYEkfTt+Km8Ma4fmKMvC1qo2JmzEpV3K

HmuB5DHnfOjZPc/azyC01wTfH6dV6eBwnTafZDWphZ
Qt+Opa2AiyF/4a3soNO660cXUBErxu8trrU+qmvrVMP3691+xAE9y7MgSZ0Ntt1T5pJFYrc2JtfjB53o

oxLKsxg3ab6cWCs9uuW0RcdTpP3BlcIep+lIHrk9JdsuEbhPNyOsA8sghU+ey4xr4HB0QguMBhv9aQ4H

dCYhSPxTsuGPIwuoP2m3+YQyzbgND9uTq7O2wukBnA
5kANJEI0ltdJqpAVbaVLAaQrK7AHeGhyj9jwKksxY5iw2/uboa05ffxXdjnQXRyCOptM1M/xpkfP4Vh4

JXcRCMZtz7W0GdyMSSplJynB7B/ImnW4WGiBL3t5VculuCCpi8j3Oc3uMQ7/NYfbXHUTrHNNWBzzXdM3

hWCqYeJ39o4e6dRbc5XS6ZJgKsRDKwSQ5b4wI0066u
F0UpzRGczaataYxvH5VC4tHYyN7MNkejgB97UsQYuHxpaZH6QVzUcMDvymjvRufLBCZSbU4zjnwBhP4a

hLvHzHoSHuK3DREjESS0vd/rV4u/LghcTLQwgIifVSf0OOvytgBZdw88ctf94M0QRYOxxKkkLytGK6v7

HQZzyZxaLXc8tSC/8T6GHZX5IN0dkQEUI8ebQBKMuN
P+BwPORNK/e4hQfO1WrsAffi90XbwiBKgUbhRnXVA86sTnuGM9DfjaZngJTABkv9fD9JqkDMlPkKphw3

fhLdMMbbLxWe/s5js8IA6sR3b5BCo/zNfJArAUi4VBxWnhkrpjb7E8gN9Lu6ZveFx/QcVBh82BUQ4LE9

W8kL1u+r0jFRyMJ9QFmYX3FPrQkPz7iujk7lEA0R93
CJO9Cb1ORl1lFgvLUUZNioZt1srYq7y/e2tPEMglg3tIEEZuOKmzYALgcKNTCeFobNPYVWY3DKEDF6Pv

wYDAijEan8KMp5B9MAF3uxU+MEXk5AbHzb1W6Nggy5/Ku/8TKALNYyOPjDWIFdLyNezbKhBAdBhO/mXk

LfNlaXtpzVn9hlYA4mfOaV2dh0JTnE49CF+Nzlvqev
jVWnfjkBFv3bQrYfQtNaEknQ3wds3Ek2M0H1WKbtTHaMejrzH3D8NnyDcOSW13swp239WnSajTeg9lqz

uv6bQ+tAx/TCPxNtVam8kph7huHq/CsN16FPwI+jtjKMv6BWPINJJsmVue5W2fCLDcJ1EzwWzpdsItlw

TqmPhl1RFrzZ8wVBOm/RsNHnA9E4AmgpE/M0bHvzfI
khZBPi0X9TCoyxsSTMrggzqISV2SM0H6HzCf7E/PQrSlLmlEIw+EQW0meI9zH6TCBtX6J4qJd1uSR8xo

UsQY2GndndqB2VANkF5w0UzJQh7NIPe2zbq1QD01GZ3sn9SAcJvzvGqsSB2dcCJsahFQTW0c6Fa34moa

zhY1ii9CUxx6Qmq4F0QPdccJkd6U4k2No87ioA/LKp
jwUFaH6AXg2THQjsQIJ5Srejd80IVSpL3Fg1EDQ0Ch5wV9bmkKvKfnOv3gyVl1Fbg7ll9Q7Zx6/1/w/6

luXBa1PmBGPfDAPkUZFDL2EsduTLfsLMuQNRDyIWx2U3hWr39NXg7u01vn8peeN+Ag34hxOlDaxUEAe8

C25qShBLfVsHRdXyqodH7C/m5WNF1+p/VuoJ8Xus4C
r77E3Iq/xUpx1F+KajPV9RBppMvaBCXnYSoU3wsuqO/+3uTFa0dq9EBW3UpAxvnHlpnDta96YE4rV1F/

9RkQOTS4JpjW94wffIr87223K8EQFquS12wIgobW2UWi6G3GbZy2luPUOQosWyminGlt23/HW+gUMkbc

EHR56QyB5Vhuw3HUe0Ack96n/PDwGQCP1+ok/pDWXy
XHm24S936J3b+aj0pNrAQHFDTipD+5/0T/dX2GW6qOFHZwEZHozw+1CClHiFd1pT++MVNvhUtbzYRZYh

+uQOgO3/VxByLwlmWgTlDv8jDulxD1UNygVg2q8WrZO22GVtOUeTUK2f6rudabmKct7k89oSaQkgRbyu

B97Dg4R5gZCXAr9r3MxLqOpNbjbekxyP/7kDeGTNCl
Kjv/aazjss1PrWRz4RlqgPs8eGrKTxTRxtbVVJQEXAtc4Gqcz2xDZ/gxdkqvy07aZtQfUGr33tGv+YpH

npTOda7V+04YxqOEwfWp54fz8bXJS7grrncjPV4lyvtZmlmkAa/oYLYH3kHIeFpeq1ZFx1Nl8jIBOlsU

CTKngXGOmRGbeHNVtWPI5v27W/Sn8RPfKL7Z9SBgn5
/+RIiICnFuFVoN4vI878Oif63xd25khDxhTvzKpaH1bLlZZ4tjyJ9ftaWg0MgGbDfs4F9Y4HFLnelbxu

XXP4kvFh+1ppAP7aTcWzCes1X2SLsecRVXL8KkNbTFKDHfvgSzNflbwW35T/sJ5GJW3wR7B4Z3hmbGe7

xnmD/QkrciXO/AX6oNL2Jry+Y6S/7giTz8oeah4922
1+iwIS33L7WtiVipSUNT402fZD5RDuLLDs+1gyTsRV2fmuuAVsEH12AFWZnKPotWnTeWzGfEediZqRVV

OA4ZvAVoR6/X2yAjpnVblqOxg1dhh6I+4a6g4NKrpiNnlBod0/a3KRDB1HMdUMgsGzdH0qYV2LJ3Bwd+

TVG8XgKFaJ9znU3RyenDI84xPjL6/0/cAheku9flyz
x/0L6i+JvZtysNhG1+8Xwctm1Tf1WKRlF4mTazO6VJ7E8UZurxqVOjPj6gKAICkCJt0/gne8az2PAM8H

kfEAJKer8iGXqIm8hQhsQO1xdM5i8N5RRHpvn/SFJ+XghGqHsDYzPtMNYI8fbgHUy4BzuXSOR9w+iyxs

HhePNIW1clWzueE/Zn+3RbhWf9rhWoFxs4HJ0Zne9G
WNde4vrbihdPH8mm4Gtzuxc8+gWrS8q9090kn7IZXVUYcnQQXCz2oHKdiFlsVVNgUAV3CS83JbNPbbtr

mGjf5pZNhuAXJ0W7psdxskwRZwfZPcGBY1YQSFTcYBuz9unsCA/kVCn0U2MF4BQ3UcaXHArpnzjA2NgV

SaE04g2lSP72rcroKsT6IOaYcfGGhFK8KEXS0yBNvy
0odRUEB+HdlpME83htUPtvp/3vFBLELJNsfpb0MTn8tcsx088NTbLp+vzM5WkMsSJpzFRuS49pZ+Lk+y

QS7xNE9gCYyT0UyQjHWTXcs8FLNrIqBbXtrcO5q8j/Ba7CDzEFyhflbKwZpZCS4hv7bTQPGl9yWU3smS

WxJdsFRi5j4M8DhyacpfxxJyU23kgJqjj6JVg4ZqDy
naaZ/teb9BItezfu43hecBDU6gVAq9VpOIRvo1bgDN0m0ad1JHpVh//W6isG46ubdaDdkpOR3Cl/Dzwl

nQZIMyjSbB31pgwairZb8Xpsg/p2lht0zURpGH5LeXLOvUICnjQtE3k2nIkuKIoR136YXIf4fD6baLqe

Oh3UDn6TI1C0Tma9f2h9GPPg9UmZ+frF9717FKcgyP
CTk7u1HLXJDkeIwVjeWpFqLZKp3Bjf/nn7OvE2CcgkeyJcpQcpLlVyQS/Pq2JAO/kvBc2guQ5P5FIK8X

YcrJWPYupUPZKf2SfHKWjZGI0gkFV3PU3kNGxX+5KF3xyNULFLf7SoAXT4Y0y9ArFLEUresavFpSqWDM

HFt66UT4h9UMbodbvgb6UHT4SzBhxadtLi9GfRxHEn
NMmifJqVjNqfGsgu5c7pTDnLCeNQ6mQdOyUHN1fwX+F2Skw0h5rlnQoBL/UT1IROviD033ftLdopsWSU

E4f+XDN46Rp2Nz3RHspoJjoSKhZ14uLNHVvq+X8IdjYf1GYeRKXnOE95tGODPgLWJEOCKcoxsd0XPztG

vLt6S/wOVjHULPhRUxYaDIwlM9bKLJHe6+hTTPBWGP
DUVWtpef/TciC5qZXvVRnoKHJhYqi+PYtBn5hZqQj1lBfkZEVNTPgetAqkMk1b/HKiTeg5Iwuffcw3sI

w/mlGGLLpEPTSdO8mBmpDu3wWetWUmQTINeuZjGAhwtVVKR92Waxaz7IRNqbLd2XC+YlMRvmrnAu6FFL

ejETPyFJkqv9NJynPJlkdssWUKnPmEpAZ9VePLEJhB
C9FBE5pRlsr7tZS13OD45NiV7/QCEgRNWo7DICerdXlnM3kNIowqTGkcx2gnZk6oqjkKklYEXe9d0hgd

FILiS3eDxvL0mXibdrAX3o57YQmt+MD4dpPxj/8iL2N81IoFouj9H1genp6dM9ngFLMmQI+XsF8oBAO7

BsyWn1JJJnerbScc/d0sDpbFTyNHKMxbbjaAGMZJnD
EH+V2BN90C+6ygLq1pqk3fOC6V1S+C93IvCw3E6RjEFDDFAyhXz/5Ve3J1oQq6BCTmKhM0CIT1q4N0Kw

mJG6Qve+fvFoLiAmQvOwXXlC9kFxwbaS9KIxCgYl1VtNBiE7ZV30IgBFpsmnKE6Xiy+fGtdOT1aoNnOF

rUsQIDP02FLnaEepn807z59/ub0oe7bCENA7I+mHEu
/sUba4QcmfrCsBsbZfuf014Dlj93bLhrTPqovGQAHND2Twzt9KoQA0UHGbeS2HBqRwyUjWC4dLeUjqQP

/ucdoGO+LtmuwK2PFf0Ba+S5fb/OvHNsQ2bOtCPKbhYGzKr596CaVvHRQ4CkkEK/aoZZgdr3sXgLkxid

6AAw/lpFNTkBt6PSmtVgFyHC4KXnA0QmtTT3BNdDW8
Hf9XWe2WkiZs1rGM1pcOzJunLlLMRawzRqEdxVW7qfr7oOOmVftPr/NINxwsiGWlJeIxJRrjt1ZWKDRx

RpUQZSmSKz3p6jm/clcF/Y51ZHJE3hmSnJjrI7Nh7zqM64xyVGpTtEZGsFbOzkulup5lrhP331bo0RG/

rj0ncwmElRCcW0ghwL1uBE98Gj5+YKPDL2wHu6rY+p
aIvmKzPHlOR1Xt26xitzrZCMoxkhXjAPXjeZ9Au02CBKdOCJCeOf8hGDNjaCscUx/OVePqkkUE1Nr9OC

eY90BVp+f/rEQVpN0fpQLbWVa66Ttq86dR/J3yXQPt+0X4W3uyEcNRfo8VU6ZmzMialjZ4KA4AEO9nq3

0dRAY7oskNw2umoFv2tqR+tONjryhokGw0S39+mhhp
j8QzixPbLXrhAkF5Dd9sMsaZd+Frz1s+jHyYTHbxjafCHDL/emTWmIucUJnpZDQDP3+SPtymcnIIPmFQ

SsidiSUgVlTaxc//KI4XlzVVjqxC4jjodDhZE/AYt0IBpjqdLeqGIIXAa4I8tK8Vd38U3MxQgyc1p6+9

bf0oQf8ZUTdlXdADmzSZlE+ISK8tPdfp7Em50idSgE
I8TzTckmmE4emHt0QnLQjsmfpRdCRuRm9ZV+0FDW8FdIx11++gNDJ68230krjV/JCn1/A94PB4T4Ovg1

wXZ3wpFUJrAv7zcXLwRSULnWMDNQ0KQq7n+Iq1OlakixZ8u9WEswSQqmryOwZluHj8QIU26+ciRVlw4Q

ESY2l9VirhFanGjqxOo1wHsIlMT1SCfsnH2o4OLo78
wvCh/zFO4k1VHmxn99/A4YyS3ZdOqKR1NeVhI7Ro1BWRYWHbU9/LQM1N/ALrfC4bxEQzUvy+jMSX6+vu

7pQzi6Px0oB8gK1qLAeU/eVudkdjuw1TkdiNE3kYje9nh3TBXGdRgnUdj4kvQ4PJC7PhmRC8YOLdJvBM

iD9fG1efXOzWKQvXAo9nM12ukA1Q/rqJ5X+MMAdkpO
UoxBIHkUqBOl0xBh6VVPhGm5Mrg/WNVl8yiYp4oHixFOmNum1fs5aZiLuTbxNRLPL0RmPuj91JsDjSWH

D9qVlWtUKaUy3i17LrlZZZGhOo2nmfZx1RKMbq8JP0cM8K/NkDlk7am6pwCyRW7paFrVqfjAkosns54r

XcqpLRLhbMG2DjD/Sf8Mitqjdn1Frz9JNFygF/UbGd
C+b5jwm7a6PltQ13WWbYin6qI8HFPqr1GwzFlgg22mUx2U0rPKPeC66WyUswLWK1LrgSpC575rPFMIJR

XnW6XWfAe12HVe0Cb59Oeam188N2wokXob4vG8icjoyX2c5j3vB7ymb9sHhPSrIyyH2f1ud+rLcIGQON

KKTaQhBSKZg3t77Q3m0zPY3ubU+GiyP6khgjvCBy5F
ngZVhh0qIQqrLuac1HN0f5MJuKajYezQQcTmEPRBP4ABi17ARUpK2p4wOvVMrguVHdMkiexRLPbNY49d

E7Tmr+2GNVxQNYi+J6wb7egY0xTUpfWkE6RGQk39pIo9S2rKIMtDyzzBrVZcyD5J+RmaAz+0w7vyHoUD

iNXn1/+F1YkehY+k1t0b4wqqvcdtNttUkTLihcGFTp
TY6Ii3wcM74ocdtQLYbxFQUPgDc0hEAp6+zYdqrpwMt2TwBJh2ywEPPvE0eAgBLBgbNyN4CGtetJDlZ4

fyBVkLVsTnBzyDwXqYMm7xCY5QzKeFplLTnfOkR2li4zvraT3ziu5mAhtsSlcDv5wNCeSDTiqio1x6N8

aJXIEb8//zPjbrOTvY4wGM7vNQpKn8SRf2e5d66iYd
uArrUqlVaYw9sYlu6qN1796KWfyMsex41Z7+G1CF3P6j7Xy7PT2+ZqufF9icWe7moKqViVNcBRSPSXmz

LcpMFat9sLdc59CQ5c+LY/mqVLiGMnCi1DYcHNOWCna7SIXrjYxQAm5RRui0R4vxO5DtZfHw6YEP++aR

XasF38guefaj4ZrUofGzJPl9rZBO/Ep/80ENVcMa0X
wH3GczsHmGg1EjLMbioUlSvkxHjjHdWpogzsqAyA83Ue276YfMrxKCp25Fbv0gstbDMiAMz5Kno3BAmI

b8HbximoaPTmlZif/sTOjdk4Iicg1S4QakR57zc1rTrLP+hJ/af0Cqh8HOIa2wAHLRpm6PfhKscozZUb

kxyeeJfRuw8+41MPCHS7EZbn/nNSxhihO8F8pAH8hZ
8jJpPG7NZ8z4U1te9R/4ow1DFzaNUifQX96Gu1wPkFOuc31I3+P5gX92IMU2TMKUf8MunexFCGWsKWv1

UyvLZF+BDv1v07fqmpJ4oEIGCM5JOcz+itSTa1IEDFG5sXb24O9prx6OI+Beou4yT2pP/rwdyYw8ETOP

SzFeeAiUNpMRJj1mzT/qTubYZRdMoppOeLuIvG8wyI
W+4nYwk6jk98BUy6IML9Y4KojvuX98RgrsHfkYAdDDHCmG073qYgJ6xzJqnXwYANmGje9qccj4dJEdR0

aAeAZ4T1fTfxsV0z9h7DB+C5hbpeChtqBXJxfFg0wgCXwsdrvHbeZLznn0smL7KLdFF0yINI6s8euz82

gvq5WBMOil4Kg5xSxDbwn8SusLeEdUEG2Ub7si+Nqx
gbHPyT5X2noCDG4hXev4+SysDaSo1mS0GpXkegX0LxgAiWJ/belg5jSbfNfi6JlHFA/luuu0YAUZb65e

gK31z20YaTttNHcBMEVu15HLV/yljHLAZStqVkGzMRuRFrNtMFS/grS5GH/Tu4XHjJg75nR7Ye2oZhCZ

zq3YbItPHWrjOBFy+VJUvkoPCuLM/vUwN9HPdKaesw
CN+nlut3RIQHejqk5xXchgyUtO60hMpxjikseLz5iNTsLtukL9jDlvVsjGq1RsKGs5++UzaM5Hl6LC0g

j8rWQipBUNMJ9LDs7KV2Eo81+XoAWx6kmAwMVM/XzCHD9eo9oSdVG0F2na6t3Ai6WV7Rqv1ywP7mQrD2

wrvZLrkhGEhSi98Vu+9OpD9OhW9bQXjj9dALvEMQS4
DM1VYegiFY0EooyL6ToKyJzIwJ7zDBEyRswzaO2HEN7KjIFh5KTN7ijkWsy2jXCEL0mNK0NYtFfle2Af

krG9o6K05dknYMYPrbfeCmOE0tcIFQSQtXkVTUBA7AZNsSLmkoWAvH9Sgb6eiWlFwQzVNJZeWCUZ5/WL

18btlQonG4Fq7Hz5IUnLJwqfrYKZ10HMwuEwfNZqc8
QoIGzAnS+skJUmnMnkbni9MVmosX+otYHUzVbo2cmCkERFTvTVMMlghotbI101a23NUBMftjrcfyelHW

2DHfYMpV8ndsrERPHeZ3Bw/IPpCRpX9zMvcXZJJYu+VTKzDJdwyBmHNzRdlRGEF5xatVypYsELEfJFQm

z1f4eneYpBsk24bHq03XQlZzyauJVUmOx9q8bE8py0
rItc+R9WItxmEzYwC0hFpgxHBTbVufIbqyOo8s4GoxP5l1Ngv1S/Tr+pIhG1XWyT/0QQ15FHfRyrltqY

PXKN3xAr1fMj10NBSByAXrPRQwiJOWNEhJJhwWbarqrVB+E6fTbsnerJA33/ZpoOkrMKATNTV0ZhDqDl

T1T2UXhAGRreZdOjR70vHxFs7556WqBoa37HM/99LA
Bc0ovrmF5Ji2BAHWZ8RG2gs74RBKXiBjFYdWWh2fOidRNP8SN1tErnVTmQiU23aO49DAhEHZ7XHRaRmi

ILh96rDq8V184cHKkK9jVcsOtUna6rkFucisp51JFZSM6TxCI7LAujALvSZzOZEHQb//t3VEgbVmH7L8

NW2V3TwFb25u3oKz7faUchF73efMUX7noOpa+YkxBO
DTKC9VrtB1AWjyGg3Qa14YLmfeQrHET8OuAO56d0Nb52kQhP4tpV1iNgMOkZlz1j5zS8T0GgB1pVtfUq

7xluQ3hT1d8aqvFWyJRFQxp+1bSrkv4z87moMcxC1VWUXXInPnvA73tli3Kxgyo8ocSEWSZuSdjlBdv2

oYEgnYE7U3ybHK2KvG0UCvBHafJcTtQhPybJkMNglL
ZdBwa1OgCHF1rlFqUlvLBepD5qRQ3EDvEUnqFps6VmZXD0kOlLmNTmauenPFGP0CdUU+YQ8r+Ca7jaOk

lJbARQxxt6xTvtMChEVa13Dq8CooxcCn/CYpsZejhpDlde/zxUaZX2cBz5J0tt2ofFecV9q8P47gvCvt

TDOEx0WBpVxHz2Q+7y274IMYsnIgjDDJi79Yy10+ip
t+vtI0mdpFTLRIWh5ARLCYnwjApMlaadwDV4Z5ISw2sAYFE8qWP8Y5sE0QzA574GLYMfyhmRN1UkQHIL

O8WlCmir+TKI+esoZQ0+sSjTFRnHoZtuDb2LmTfXsGr5LozzJsg3+AJUJ9CY+DPAN8HGapFFoKdy84hL

9a7BiE/ZdezVnex3tHl7Je5o2C8NuRIJooihZffGTe
MQsv52acnzuBEtvYobVk84EKLw+PODzihF2P8j7IxsgLGPh8iBmoaryvCxARrzsV8SBeta82aN0+2VDi

eqNO6T5C7UHumFMRikGaoxXx+qzEOGtMvw8h4CBWmlZZgE/58NQGs/aV/CzXRS3uBJvIM7DOD7TKWo4I

gwvYvq8kk46nDxG6EuxIVFx5aL3U8Ow+VrpWYKSMk/
i2yj7PMgXZG5O0X6nIp6k5oev2eaK2P3J1vdm3MxZs6gMuLc+79nkt/7pEppYy2yboSnGX0MeesKdpqi

CZURTzg+O6E/YOsL53Ic/vX2ShmcpFOCSxwnKkphAEfEeeklrDHaHiIM8esT6RCUFBnUD1mQOrZYw8D/

X14Sb91n8PrcOxuz4dQpeVt1lwRuGjuhb+yPZJlVp1
55QozhWsil20tLeZO7SQ2BRpCNxN2uRkaqrZXfGTNQrMij8YIrqK1k142l56iEcjxroVQvqN/l4EG7LD

ybjydUWoMJzPqxEOgf4zuVcJzcnl58u41t4xvNEyUOX1Hj3+eiVHyKa3S5ZasRlEYQIX/2bf6r2JaVL+

w4PxHMA0rbED6KNafyzkGXG+N1kpyt/Sp/IXkOQxaT
fowWvr7IbAeGXGXdOkqHEhunbfs/cKrQn85LS4JLMLIVr5Lly7742P0tQ/OfT1le8XItlIlcgdTLsmuW

42o8PUtsAFbEJexr/+jxLHhz9YOMHD3X5TKOuMiUst42o04AAKx8pyrg7q2+ProeKngfX5cVCYMMiuxZ

P4pa+QZFBe7pThoJQxiI+VLPjiugps2+1pN4vWpsCb
7XD/Zma+eAi9No9ipWVC332HzLYRHDxGq0K3etFKdPiYJjrMaUAwmyItUFGdTvqGboO21lBXtLLvWjPb

iHmMzCfHAxi2WuVX0i+s5b8LgCkaR/aqLRwousaKsVghv2jwSn72pACoT8pGe3J4h+i8Nfx9mSCYua1U

em8Y8YjMf6EAW80X2TYEIlFLUOBdBze2K4dp1frWlN
jVikIi6m3IE4rBEOyaPtBOr8338HghwHwWJWXxtpiSiu1Bs9bw6T47aR51jAXhkv/4pxC+mzWC+6OFDb

LEXhIGgTh7RXMBWrHWjfwmlxcDc1k5Zu1sPyMfcTWXKTKVOBrYO/rqV231Wbehbe3X1w709Zq4Auc2rR

ccOr69D+bU5wNeaLXnFZ5zos5Fr1Koz+NTA96/IuLt
Pv4yFf/iSmMFjRFBOQHZ8qahzLZ4S2JmPN6ohyB9RmmsYP9VGwJfWsk6cIoGdBeoasRfmYvvqc4bNdBH

qqgRk3ddIMIKwMEKIsouEQtj2w93YIJl6WCpt/DarkD+WmCVhfNAw2IeUUsKwik63/paQV0CDlW50Fuy

gR+IvqXpGEnmJLmS4+yjMK8uKQlzz3KqgtxxpOtzW8
9unS6iSEhhDQT1pKMAi2wDFrbgZg4ZRb3kxWM/picrTTdX1J53Ty2NS877ef9SbYO+0XOgxvNO3RaI7G

ZVfC1uizeRBj/0KY4+ti6Icuz1UsM3GoNueOqX6pQcj45gd5y5S11zMd4F3eWLtkFLlqIZIuL4v/sqH3

PyKCvuyLRHcGd32/kagZv38l7DuHQ01tLhTyDzMaaW
WySDUz8q2uZwy1hLafvVQ/n+51nJU+k/lJnE+fwdxbOd9StvznKBRUfGqMaZ7SLhMAi0B0jKO+ookE5p

I5oMvH5KH4Sa/xbsPD9LdSey+2pLOeeVyvLC22hYnwRBrWYA8QUFeAqHvhP+zez1yDr7lFgb0mFvbnPw

/wRRmB/6GtoLJX2ADT6JlHle1DhPRnvIBw9cxSqMFo
qrbid6/hKzuqXXeu7zq4C3E3hX13xrnBkfwNhvemudt7j0U6T6LFUanmMMN51mj4AdaHAdhldnQV+tOy

5Vy4JhU12CKRy0iE+nD58GV9l8PcDrwYyJiTv1cym3TtlRphcDBERQqb6gTcAMh/q/zUxmHPIZ3Pyagn

kVsgZopraaceFTIlCsTqyxk6UUzs9/J0arnCDDMB9L
X/P694A0iBXgvufsXfZlEsnUVdxaB6HyLKyORrbxEq9ufJ08xeYbGZkUBlIrSYujyTlVwXnb3lG5r/b2

N+MDIMBtASMXLrdNt39iwh0zZgkJ+ZEZ1YYYFXMS3JmRc83TrU9Icz5cVSWhhcTGIEGg6e3LRE9SCU2A

hLM71obnPAmCa57z0FVYz+eaPY7L0Tqo/w/LyQ/Jiq
Uysrmh/9ytcE+6mhdxGtriqyKAz//Kv7c3ToSstcUoauBYqR/lBcSVt1FgU+ul24oR8wcy++HeOlDMyU

D19YcKHMh2On94tZAm3B/oXqLp8Sho7gAyJC/TMq9yoHY9g+Y9/NzoMdbDGJvC1NZfzcsxg6tmnjFzHT

Og4S8/NtD+6uixlxm+Q/wpbjp4J4I6d6n1+mOmUbVM
qiB5nfbNlnu8hckpsvcLGEtUE56duiaLMiboCjxFCe6fpKEF8ESJypX9OlpnjEaiTJ+PwB5dct6WNUEE

wo2S8HCK7M3+IApR5Thj8jCxVT7LU+17qKpT6jcrHGrrjk9mobsU69NqdWsGHf13YEk5Cz83cxS1Mn1r

NmAA2bzlxkw0B6qeNNjoxJyUHOZnPwa72iu9kkjX9r
1tMdgjzhfTzSRF5Ds9W8QS89/t/lJxcRHWi5R/gjmFkDY1DU1LxCZ1P7cgAF2I4QeXOtLL/zu/2Fg9pI

ELhRdGeL3T7BYhthVZ9I5YKfQoOBCNekSsHx0aM6jr+DHktfHgq25p8NgzhiNfET4eZ+IQvWn2ZUaQQw

o5eOiQEo9JzH4KuUgBfgCX/jD2AiCfc+TVKYc0B8d9
mkjRAqM801Pqn2o5grivlmafWkB32kGUd52N0ejXezePeJmumROEz3WNoI1dzYhEu2c4J+GRAj02+dfu

GnoobPaQE5Veex00E+P4JmzHiVYbIdvz8tNaXpYt2P+OuDHlgveB8TFYItLfFqK2D6LjNR9+VgEe9cZ4

QxezoANDZZMuh7q8xw7FuPWl30Os56nmZNJgMPBly7
hFdvz9BQQHw9jIeNyse21nNGfceRa0AGcCU0r5EF3RcDrx32VImv86QpqB5F6jK4Ija52K/6vDAmABsC

ivkyOGmFFX2juV3/CBMcVPdNpAjacZv/ZPJ+yf0d5VMNBVsPC0D5Bffdg1UkZ0Qy8A2Ki8H59Bw0nJbE

OIlk7WL+Ztfic754/2A0V/SFqLZLbRYCUc6mMNLQuM
VxLkii3EYNir2xit5WyWbi20pzB+4QJaPPFKHoyDPqKwgL5kCIUGLZ8n3izhw7JwMtCE5GDNPEttE2xk

9Q9aYxMJWk7PGbRqkY7FW40rrRSLxuQ1Z5Rx/1NVgahU0GY3V1ZO9B5x9Q6+XDlcmp/pPch11Uo7hULb

cMQRxPXifTdBtbStPIUHuWFvWvjz8Ld7tv2Xc1/gH7
r0KHcgkElwij5cNEyfyRI6Dc+309SRpPRZSvrjUcF9yuE47jG7XhqZuJRucszCdhUG+kubqdlPd/7+rH

o/MjCGWXggdkGbInQ2bgwcKwuMyi3P0o7G2sKNolbUI/HRWRCJ/XeguxtL+M8/w0rrsn1XxoziiV2hFw

o52+C8skM7Ydl9UxeLfy8zkWNsFPdfzqTpqf+cokP5
cimf3NlUuubPWcZ7IM7C9JuhtAIU0HZZ7WsUawQQ2jQEmbVdzXBmGWFs1BMhQHJ6xFzTalCt5FfJ9xRH

6Mq8Fg9r/KIr44bsuRjV/O1lueIG6S6YbtEezJB7foJgr2pCoocyI3E4nHaiCuvaFqebgSwnZrrjk1N0

K+5hEFjvuacMrRDis2mYtAznhVcptM6eeZZRw4F/dr
gYwL1OiGu0v11lpQrsjlpQJ3u/giXnIesXfFnauC0jI1pRO/AogoyjEFaUqU2eeoo4ZSJAjMmJuepRLE

lKzM3B7gYmxWNo//OMRiXfzIkU9uwSwCtgHtFgTHgyvd5whI6tFGIbDzNftA9lWR1/nMwhWVr0j/BpgN

tUnwkoXM/ij40rg+bItXhxEz4y+XMEAjf8JOq15W4l
btjPkYBR6UOpb+tufqi4Pipmd8IHNvf/YXtbMm4d4iaqiMtmiBa03QN8AlHqeG2xPA41uJ05lmocWRzn

0xtTRDlgiAs7dkA57JwY8Ly2uU2ze6q+8VrWvO3cuVbEF+nVtUFsafQKotgImkl/+LRDPUBiNMjHzj13

6QLZ3RdHl6Qq4Xxetl25d7gyQDuzrSN8ddxD9DO6qt
YwWZ+7ysiqgwjMl8o1HVFW37eq+1rvgkuCqsXkoqO94xZTk+uBIhMROrQjJrrmf8S71LTu2TbHHHQoBS

cF+kAELC0mXMFRw0Jtp2J7qNeibHYGNJaoY4KtXADesc3n2KoZV5elfrkUJpHv+koWyijG0u4zYSzfr8

Hnj6nQ6n1mClvoBrShODr3kF6rCO1U4LeVwNECzG82
xnFMg/LAJ8Q/QlGiY/dpSwsan+uFmp3qcV+waP2nrxuwv2doqFgHYLqs2fa1Tn92ADuOJyyahhLSBi+t

K3ulAS18cO+F4xlNf5SPS2F/pazPVUZRliX+DYiG7iokOeTJWO1cHVCPnmF9OBGEi8zcVqWV6vxh/RyH

OhpG0JfnnFO2w2yl63g73uY7mblwyryP7zuXOkvSvY
xA+WX9hBhg8A3TQqN3jcLaYYxiHpn/ePzxt/ekM9DpnerlbeUKbo6ioR+5kx8vQA6Y9aZY1YHq0KhX0w

D2ZVRL4GQ8Wyxk730rbvxXkxivrXck7cr+a1uMGjqiRBMt9Gavr7bydTm6vO9hXje7l311pp2wuMpvXq

Tmlr2ReywEXAngH8ZP4V2IyDIe0wC/y2cWPxUiO25o
HNuVFZxWuvIuI6IeSilQ/jVB9uuNqiaj2qZdQ2TLRA20HBWBOUA/KCi6E8FhUdfxsUPnOAFup1aXpydu

Xp3XLkOlaocpT6Ywf5PsnQpkGazxkfa/jIreofGrc/Y56dgYP0xMy0sVdgAYyEJgxHiKif+b0nt0Wq0Z

qYkcHf6YiPG1y0qweByD2Pf45a7XQoRfjG+1+0Hykj
PsQ1421cCeF71E6Z9NNHcK3+kqS0WrZcSPlA4L/hN1gknRFVGc6y2XAIeTPN3ouFXex3CxBEFRcrDYQb

1fM8me1t+JIOchNXEL3xLUh01N8ydN3S0+x9mCtO4Slva2j6SbVPqIA6ijG5CKC3rkSgPlcLiVuLb42R

ATxn+qUMjUnnKgVrnsGtUFz1GgtqEFVoqcgIM4gFma
QzNXc9u3RSz3pZyghSX4NAyeLViq1YTdT+icK79ISEiIrpfzVidkZR23ZyRyptVp6e5WhydF3jnEqp9p

7mnmrNJ++ldilac0J72OH69/StbUKDWnlo9mjlhd0anLzd0Mwpsc3pV04da6heBkxoYWlnFF0TXRA0PM

3ikTC+UWuN6dftgCWLEvcaZ278jZQQmN1j+lIrH0gc
igZjCFl3WeZx7P/sn7x7gymqtgPm+UeonifKjq6aeuSFc1Zu2vTvhuIqyWpyJ/ehkv/8pI3MWlioV4+B

6t278Y4dpj0WZnYnpqZ6QREUyoJu11yiN2FEyuqOd3n9cdIJ/Sc+zX8sWl/9ynGJtlfDywO0Zd+Z5O1u

aqlzsbMheVNNyxp6jCpY388BMRc15w3uCBTRTglGpw
JV5DiRg0jytYi1UjNyIzlmnbRua+9aT3fG90r3mvG4qy5gWrf2+FsOj5TY0rJtmc1wlLYihXRk6tROIL

4/Q+AardowzojtY/Mvu4hvic6s8ManeIpPf/oprAAdbU15JTupkZ774F1wo3Us4PhG+/xhXF/ZRDcTzY

Cn9wDRpbde0jjWQEBB6/yIMqdqPuha04HxOo8n6tU8
fuZ0S9JPBOU6NTu7YX8gG+xLT6l+z7hmmrFX9ujX3sWh6Bzql8dNNnsefsg6cE6WE9zllOD/hOffwL+N

dGP1beUZQj2qgpFlUodVBp9nZt/wiHK+mZlNYCISENb1YtL1oqVnHpS7QuuExSorqYiFAcamp2rgPV/r

e2NMn59ZljI5ZQvg3izLQP+hkrIxlfw8+GF4uk/+EI
RlWDvdYKE0wR+2565E35/Dt0/yia1pD/NvxEgeDdB4VcpFI7lU5gGrK/IeB/1pb/dvznyOs8JhOmsS2T

XLZslJLhQpj8B4Axc/PomKOfvyu8E0uGRdoMkwCVmzPg/t2rOEyrkQS4w//5sRJbiK6AMKJQclkPtZwv

R9F7c59/6cfsJRWprdIPBhamQbH+JkFFDsbmoflGSt
5Vu2E/WTZ+9JEyjUonjVwSB/U/WBgRjX1X/FakDHMDTh9G9vS533v64G3u5T3xPBt7aLrlM7eHc6agZU

GZYf7+lQGv64Pw5ZK7if7XpM1z/OkVwayXtfNX2uNzbP4uVnhnq9T+oJhh3M2fpmi64a8m3klScgDp2J

rIMiB1SkyyFmXEkwseT5umcSdX+urnD0Idcbq0kH+p
ezHSE9gbuPZaiZBPpeIs+bs/Zy6lEFoQnB5oQ7TTt/yGmeNqV+W7FdCw5Fvp6w66DEr5HxWlwvFzWMkW

BKiE1ChLgYoJouq0TeNpX0eWnB7P14jw/4tZhSdB3f8JuC55CEs2Cs5appYeS4YzplZt/HSAfXZqGOw/

zJM9OjfEnHA/5resqp3vbbBxg/3o6o/7PegGP9BB9w
nG7bG99xUGJH+KXuG3Cdxzc4ZOj9vJb1IjYk4Jvsva2IG9eH73xpPibF1Fux0Uu7jqXXr6QgLEdZmcOS

URnEQDfv+hiS91jM/GY0f8qyNch0C7uvpxlIE5MxhB1c3AF2o5O1w5+ysMJ8hRWVe/XMVM3sb2EzU9q5

PsRdtH+jIzcpaqkmLcSTbwjFwFhi2KV4EVls4i9uMT
KHDJbSzJQuqsgn/tsLYigjH+671e2mibDRwNRWS4uSAze+uFZW3BW7BapEKP/TvL6Tv3awfZtzin7PJX

EctKkK+hVul5QPrC7gtb0/TrvieTEtClHKTtSlCvrmPhonNMaiSh+iUwmwimP0iinThIOXey7nXnzEws

AnhePz49/dXt311O4LiyOc5qiK+4LwTSO090bqY5Jh
5oIhuXj96GAcg6q3zRtqQSOTjcMperdA+zJnF5/2pICYQaRyh6NlYD2FYPLf6PJMGgnOr7P+XHQKibYD

jHIIgv2+9PsqZLBcmp0YuoMqtBkl1PdBS9HCQWdE5r+SUF8A23hrgUIKR9O29GGRt1XZiT9io2krs9A4

qN27/ZV0zw2r4EOjCWEruq7xgs75zRylu022+r0QPh
EnlEvH8/ItW/u5sxeilo6l26vx+tUUQEMbqM0a9lNPbW2+Oz2VuVRw6nlynPe/4a3tABXWo+rd4+LRNQ

xAzPtObr7cQ518DHlZC7qPYUkqvzqmJi1R8Aefc+Q/kf2+K9DrlX+VDAqA/p0mXr618GLoT2k0EMUo/z

EBaFA4Gy3la6C7P1TWkLKdMGP5qL0rYZLluVLikySN
wXxkv8iOzS+f78jVn/xHV8r6hCyXQx2sAN6szHEnX18ty+7wlafRkNWPf/U9Nc0JeRygR9KxYv+DsFHZ

XlEeH+n1upr9V/T9j2RI0i4Ugh92RBmAqKOe9HtDjeBNa5gx1Jnjt3T+Fq2v/wmY/WeDbK7Dd923774T

8fAHgk2gyJT9bEJ8vC4w7m+ICET0rPdKszq1q5uLuI
c2zs5OrrIumMDDNG5nCnug8//RLFRIHMIiooGGh8S9O3mDVYzCNRQwMGgHtgBJFKBQ32zfeMEQhXeC20

icAhJMp8bHogUjnAMovjeQPR9hyfmFiLjVp/eOj5LgKvqSh0mnLT4HHPKrrhB/td0BIzCgGYlq1HhuWK

lI/Bb1Gh1hQQRwnY/J8ZMnoZc6X+yGrjOtVasv03Wu
nK0JwRsm5Z4L3A2qM01YWB8odKfp4i/7U6XDLvKIEaHL7etPzAB19Qk6gooaptrtzi47bWXXA/xYtbon

uifVEbjwFvDSJGk+6po/dQ8lTkAh3azInzq23fyg/j+tNn8+PoFtK5Wk8TMqtAZB8YXaORVR9UIbcBGY

S+f/NkMIlrr3s+yRoIt0N9v5RFv1Yj73rTSiqfFslP
VPkvqxclg5AF+46tJ6elkv21HGGlisQK14QECWYeUFEkOdRDAZgq1DFkO/oyCpn0EbDTjApyxsrKo25h

nSGWX0D/kjhV18H78cDnNsf89CUY1DuY8IqswE4BODL6uVEzcimYLMZmQlv0L4kjatTvBsV6GPaY46dI

IPCPBcTwVPTdvDRWyxN4l8HYrFuXf4b7N4HfyBbkCu
s/Ubv2kY+Wgw38+m4iWL+/NurcvhmZmY2ZGQSs4kPVTH7tA2GZ3AjDA7rh0dg9JBWfhfsmsBUQ2gXJvE

mS63NVBgtAOg9/3xDTFlUL3kDmQCMlwo/2Pr3ccJJ5nLvwGxuDSHQvRser+kgQCZfH6/umiRG/KEF5Lt

PSI3Z3duZyTp7EzAnj5F3MMRTF21eUZBpmj2cknaAb
tKtUmj2soIZ9o7nzwzWjr7mFH4xi8nXLe09OFYsIsoL4XTbcnXSL9Ky610aH746BUjoU3L1E4WNErAt+

XE70uipNhfCn9BQYdQ080m2+Q2b0mwm+o+mCSmahIPzaMD9P3pntkPNVk48bagJaasO63FDR+ihGH2zF

0HTr/DUnYskuLItBXr/HJuQs08P3C/5h/3quu/5btG
Bb7uuWlsG/yvIo2+TwuLnZUN9C6PcSPTit6AcrXwH//qg6bIzUnLT0f7r7uVS5yXsVGZHDxjDan8e661

w8TEKKrGdGKeVEap/GxF9bIOZmIbu+K2LugkTqzd2bemBzWqk19XK9MEPWWJqWIbbBh3BJIjA1HitZdZ

DLjDHR6pGrdtWDgm+cKQphhs4BpseyiRL4JDrUlqnt
fmpTEIHDEXNY2kAM2zlY6j1kii1oMoKoQaFg0CFKMOZjYTl42t2pJCJMuOa6TBUxwMBtoHJr7KHTZmCW

Gm49sYo2CSqrZcduuYWxc6omBxojUUkz3pPfc6SQttcggmRVDErG+RxDtc8oSbwca9WB2vOR6BDAjTKG

Y2wVxtA9/9rqJw9F3Yjtm+gSZAkaSEhwCRAITnAnOC
O4V8Vc2z8cZOOJL78omFxQp+LBXeo1Q4t00Vv3VT4/5D6iloy6Py5ds9t2h2kgPRLcHA+w/InU5qbkzU

3Dksy+4uEK7gOmcUt9fncPDE/+oeZay2kQVlO+1s96JI9mOAofhxdaucmvMXJUWPecUk7qQI9nppwudn

fmvNZufpU0Bn7Cul0kJHERGjzr6AKR1N7v9lt0McfX
kMXQ3e+G1KM0gP0H/V/JU4V35up521r5385eLEPdbItHAJppyMCdT6cviRTEEqdzpyk5lsr1hr+JuQ/f

kwa5w2AVzB9vtwqlGcCANgwFaUz8Ohw1O0K5qzdTFJzigXLWrZtf6DHrSi8ejn7sioU4pw6WJu9YsHiA

nEKukfi9dF5H1VotwWJ8/Aakc2gYgeO5Jcgy2hutJb
/LQ0gfqX9/1mBxaxW2F4PA0Aj3R5q5awN81STIVrLrJRC61byi01Ust29qSmjbf3EyTztnnf1yXxNB7E

oCGWDaYFT9A0JJaKrdaA62ybbUbLd2DYNG9rKjp7OxQPNbkB5vlzlDnPpfNdqpm+3f6sBdxjMuvz1fMQ

3miuHRxithRbh+aau+2h9FMO6UWWwW5Zpmp1k1KWSD
UchX/Oxe3rMNslZGuDnJlgY4GePCvb5cLHFO+J71sSCUh8dxdieQWaGGsIYAQ3WHXwX8laAQCRmncc+W

SGjN/TJhy0S4X8w3o4t3IlLOUvEZOGsdWIt+Ucm48CkgP61gwcfuajoXtZ87rqUGEgb7HhZaC3oQz47W

RIWTSh8BVCdaW9Ew+dbUPbpBEJf+HrYFMjJvWEu2Ce
JcvaTxspXxvX81EE0Uoi2rjZz3hlGf/R1zs8iU2t9r0I+wv4WGJgE5FM4DF6MEIUtEeZMG3kJgC0B9a9

QVuyyuGWRK0UMrdIZODV4jWCPeAh7yg0EOrBqxUnuZCmAhjARH5kOeSZ9P+Rs5KflY+Of/Lx8s/b8d1P

ymc4+Ex+to7RypC+Eokmx3Eir15A7BAIRNVIkqn1eW
kvYXtkwTWy+MFRxYHYyajsj/sn8YJtjvFV2iohwcHJ5A1efD1FYv5BhCjGqqUoPX0qB6Vn/vIDm+qE+R

8iS3ksPPB7kpSW9wqddlXuFjKysuX6taMjEOvKBdf+zSTczMBTEtlVw+KtSASyMdrTnpW3VH5vb+8nbl

H5VEwpnKS4fDDwq+HEwM7twAQ/20YIHuBH2F+90QWJ
SWpUpvw+XgXa9QwLA64xFA/JVpXctoG3dJ1qX0xiBkj457eqQjQs3LgZun1o32HXg5qJko1pajum97qY

YIIwEk8y1ZLXuaeNZUt498KdDwWy/PTtonLF5i+1Tsw+aiqGzi6yO/3c9yrwlUXhj1j0pQ1lypXuiZxk

Vw8KjWazI0sUGJzoZeXg4+QfnOBBAaldV3+oRfBL1Z
Jr+gUyXJX7Vyua34oV86HWoYsKd3swOJHlR2O706iTV+RcLsS8jZ/FHptJ+SQL+m9F06DoVO2KVFEnyc

3o7hXDfFrOgKC+GdVsRrlQwhRBGnaNJgSkIWbYXDx6vvaRSHLJOwS2nY3KFmbyrmMioveNfFoXRzw4FU

EzaddKYPWvDEtDP+tzATEMgfUe8B9RTaJPaswF3NlL
oZOrtuyoFIyZV97zZi8s6sAkXTonMaGrTzo+TOo8ESWDInwBoku6xwLlLtq9D/0tsCp0sUX+1PPaavwY

RR/waUhw9El1AugpQrhx24loZ2RMfL4zzc5MxIe40eBppNeDdTu6qIVOPHNW9tvAjWaRrL06MFY4IP9a

JcRKOixYZDBynah9qeeagngfRZ6bJ70yF8R6S3YgMn
XOX9Ll57dAijCAZ/Pjqwoa/gwlIqV4zt350xlnLJeNmLGum3FgCfDZbpbKmawHZq58hhmOev5tDJZCfA

ZZXocPSj04pCIwb0TXk/n//d2FC4GFHmuSainmUr920lM4dzUwxEWp3NVMH3PpsRcsc8LFcT+6s7/a9D

yR6SZTQnbiGBxXmuHp3loZAQVGwdGcrtu6rxO9h297
0NvpyqMEgkKYTeXcTQFneQLDjMzH6SSG9WLB5jV2v5yioNsdtHvMu1U6BB9SefGJjxBdGyZWw29s3Ujl

vZidipWJuN02x0haUIe2katffqSIw9uK+IrDhYk2S9xqutYVx0LddA9XkGopyNs4lJMUnVwE5vHZ5ISY

PAtUNK2Kjk3zTae70qdMg0fmjhohGzFqcSskmsVByw
Voa1EqGXv7U8Qdz3b55N2QRySxdWRwvvfsQG0XfIbxRdRmXsTfCc5xAJycv6IZJzhZPi2RR3N3932SUw

KzRpqtROJrM3tuLey2Sv5pRc0q7YJkE2rplMnqaX3TyAABiujxH/cQFa8ZkOuZaBZDzAfs1eehf1Qpy9

NhIdZuNICBTU3JfGxtg9WS1OuaG7JNwT86vuBSsefr
ZI+BrWaql3fqXcjnsju63pmQ41hC7qruu/jm+z9D+Uyh056CZML5/2I2qJDensZmazrYqbV9lWFDSP6S

Z5HURYUP25rCOXxlMFWKZsEh5qk0LNCyiRp46pt2PQCXej/SVfTkT8rS8wru6MwoPwo/jxcHUVoV5FSa

svJD/F8RFveqCHC517CAR75DSep4cBTu4Sx37KgrMJ
/EAu3MqG7MVZrrY8gvYmoVUooa2U2VsXVs+61T603dcRtkZR0pGL8aZtkN9Tlg0jkDLsuSm7O1o030rD

JzTY/UnXV2UOtNaRxOCIRUld4rT1m8qlkhRgKX3I3ZDJcZC/VwEYTOI5YdpsldYM1iwjPH0XjazR3fsm

KXJiZ7lWFhxsudWqMToj/dKSfPz5E6i0a67C7/0nQ/
d94sQPv86WOLk2c50Dy5BZ81xPTCYfA2D9vCekT/wyBfTH7SnD7IcIV2jnBb06y7laAyGhk029ypFA9B

H4o47tTSvO1S3ARpwg+INCng3C93JSo54/JvEtD+llD6UVSIMSlU30ytnRvP0a+F2pApOaloF6hp03ch

hr/pypqU16n+tJu6tBhSqzHCzk25OVi3a2hlRTLcvV
xT8jByscvh2YPa5YM5ksDKd2gtLk2G65nftisgJZRsybs6jowJ0lmVdh/29gsBU1vTSkJuoeqQ2FSP6f

RIVGcLybqK80JkxrlTn5Hzmhc8lMelmvxSAon/9Qjx8GK+AUaJodrZxuJHDRgHrYTJstz6DNfG68AXhN

9WF0lL3uUiEXRlLUMAmWT7neXkjdlL39DVX7e2btxU
HzsMbIeXx6po/rcmkobbdHtCIi7e5dJ83qERxrdDrW+hpKDXrAkHUCVK4QJkC7xYz6z+GN+cH8ZGitfe

i4y9a4Hn+aIO48lMt2HvmkVrZ2r+KdsD2utGFN+BAyjv+NP0BT5MRhlsecNFjabLmrQuOBFlMQ077lhg

ALswO6hq++1nyNJ8C/WVVjU8feGionJWvJGxU1qkkV
7QjS/h9zLxH1zPGJ5fCU9BeOdurzjxOhNTdlK+wYqZXptbZU+dL+ehvC/UirFaVX/i5wzD2qmOAZXor+

zwg2HLDhoD2kDI9kzLW581qlQAABzWlXY4FkxrHBBVzXwfIE/A5ZMdCdaeJFb8S5f14eRBfkoGPtSf0q

u/dWf+3jKOpcjUmWGSpEDv+V8SQf6CLKTpj7EoZKuo
RF9KVJSj+KArKmDYUTdTotSe99FGZyKAgFPnv/kuJg/Q4WGJL/cPRVhS9KNMxCtondC3EKBLJSxXmCef

uJ9zzHjSPZ/FLR2j11t7bvdNldqVpce8N9p4hkXXV0tkvmiI87tmq+5Z+ktlEu/rbrVYb2Z1njoG2hN2

WyHWsoma/qPzGbd03fzK3jTKACLnfHmqN/MeDVxv2o
YVU81LlC8lDWOKF7+AVz0wZqRj9ouBDwGrsmQcpVPk06JteGMW3tYXHJ+RrjC9zzTx+1gn+i341/UQpU

iSGN2Z1Y4eRyE7J/6JRMtA+vlnjdovULNPW9IumfpuSIkJRzwSpPQvBWjazQVFibLkORl22Kh16O04iP

29agn6DnqWofh45kPqTz/OCdqpRpW7oQ0pxJn669s9
WPL0NaWg+Xsew7xS/QK/qXpXCZIjmob+sYbCuhgANweKSJTTnR2OV9S2tqMXy4/qnz6QqVOgFyyRGtTx

DAWyPyeoXx3dyBSzrqfgJ/utmWTD25DTxVRUjbiq6palZSgy/Xhy2M/FZJkBsslDFcd1+9tTFDQP1Fln

15aVIeUDvZwLCxsc3iSLXQrM5+TNs319TcCjKhLDt0
tNqE7xlK1y84Pwr7e8UveQin3GEKYLgS6z9PwOlfP3cltLmKXun5s9ebtPx9DlhjibhNFq+AOGVnbboX

qGX/IOewJXEht4+a8uFsfQ+O/nVVCV7rWH3fYu6tnHumvDSPP/BUyWdN6x/gqkTc+f4JvSw1QR4oStqo

RsvzwE8b/p334SdP0cj4opxVFvURZyo48fNzdRdS2d
mJaE6OtGGohU2zYfGQsUP5ysgYyI63swc6Mk0tigQW86wBHxEtgfQYDfps6k2AnfI/+9Sg3xupG6POev

wgMYwZ1UyAysQinesE6voIwG1GFy3iSLytgdoq4Ldcm/aBxQfNEqQFGK/amx3NFpWoyaobP8JU8x0V71

qaCh+ZWGhc977SPjmRfdDi2bgs6XlhaBmFPDjnMp8e
80q76tIt1KXzx8LnenikYY48kLs8bNUWKMNDW5Vi+54VIbFnM2gl7FoZoDOMHD0NhiQVn19re90Guj8k

KruB8Edv7qrCBzo6Q24w4FvC5lxnleTPDFdDx8GrM7bBvInA5T4/2jMBpjkB58Op9Ltd0G/P0OHZqng7

bCetdW2W6qOB0W8xzUyqQ4kpu3sKgiAObXlIY6hqs0
j/fGg8NoqulkMDq+x0Fchji9ntrmzNJ3HTdX1nb7bIEELJ8UbmD+5HpTl1+exZtSepSJLPaiXkgC/J4t

UrBQW84d/0Cae8UWxS2MraVAwXdNNJoTmFhLbsK3+8AygzyKfN4zteGzly5b03+jaFe6KSAfxrTNQJ9T

kV7zOybmh5SqA7xJv3FVPXfc88GN/YZKJ7SBfBVkCX
1HwB5Nt5udybdmwYbRU7QNPxqbY26CthB8f02A2JhaYwuzgGZoRxTwhFhXV68PyNMeOhcD+0l8nTJr6y

F2MLIOg4fdOx7fb0V+Zpo/Qu0YDMZEr5H95jUbxWvsEJ0DeFIdG57KN+C2QiOHrE5nSvSQ9QzvqbuD2K

Wo0WWnbzbRmj+4+LQCn7QKZQbmv37VEgT78pLgnaXl
m3rkdaoZsbNzYBWWME4WNkBqfP+Yj8cfxjIYH3exExbvbn1DpbROM4sf8JMWDIgLio+s1IEaPA2Q3sNd

YtkK+FtwGpKvQmnHtAkbO9v+Va3Fn6t9SDeYJcikrwcxxyY/rmvsJYvw6gI5nTenXHQVeMfD6/gpS5wo

PPI+FXE919TN2H/i8kCAJFdojadEH83+kvCIuQ+0Q+
OuQPAophb21vhqzPgxMgV5VjKdBx8i6Dd/OPBNZ8GAHl0wZq2S/AIXBW3Xqcb4SHepz7WMg8lXgy5+2B

MddnMhcnS+JSEknN3vnLycegkGNwyNb84CKf8NQUJKmuk80izCx1n+tapvXhBRH0X7rlHuQqA2S6B+hO

0IZcobmk6YrQDY1CcAIzwFnNsLrymR4AZy/uh8VE8M
D+/lNuyOsxpH8fLkU0MwGn03/JFMIoTBz7/8k9cP5/IKHYKyyXBMfWnj1mHEYW2pPEMJYSVisq999hMH

U1Xslii7h27c3u7EUfy0cHbqjatUOGWYcO90/hHe/sM1VxCUDr9KNNyb43jzj4r0zJfgOdvWp+u514wN

hLWJQrCiR/fWKrNQmGbJw3dEknUOXw4c+6HqKddJFs
P1WuJNCOZjaE2QmwLPrKswkl4BlQk/6yiV1qYuef7YfBsjhUZg06KTb1vRni6+2X88rirARD3CuDwS1C

W0zIVIGOo/WZwk2/6Q4B1b64RkJbjH3qV+c46hUX8Ct7Q6i5cheTmhCNN0TokTYVAKS4NSYiufuo1862

hYzjfpFSV+WPThJXXM2k7qp2oxXc0InjXd+i5Abz0I
gw5vly1WGIjQHiqWsMliPQ+7mjbiG4bp60Lsg7kh6VWPYpjCTUnVOJ7BEWK0edMeQieYe0Qub8ML/iwV

9Acz5M71Fg3LN2v7rBaWmCfioc8+q47LAOkoJ4bPKWkhD+Ncd0x3yOn+sy0qMJVWtnikvO3t03ROlkEY

F64YgDUFXpn9RFxaa74nNi+yFzbVYFohDYB56nhnHL
K4J+JA1fEVZ0xQ7d7Mww1QHyOxUyvdJ/Hm30mvm+z24nT6ILtokTSh1nYMtfOf6hQ4DpGmCb+2X/+hmA

m4w1lWTT13GhobqNakAt1ZZkbQiIiJOVkTVf4N1572JZjP+njCFbbgN5+u0ndchIoBF6fNiX8hFNmQ4i

Ql7XCDnSPM/R/4FzmtP2XSW9lvcaCv3PsweTJYBws8
vmlANlDa2oYN4cp1C42LPTNKVzYA0MUdo/QVhIeWXPklzac6s3DlQq5X/8hRva3uP5UwxwzgX+R+rart

w4cXdTzXkudUAKZSR1B8VdZtqpUgiWKCukrb+S4YBIsiFUoeaDbHfyraw0IleEycvP8eoOrqpE60fkFH

HaHDMj+RUWjzLuTiEvYmcZK+7u8j5rVXhNoH0Z/fKf
FBn+vFc7zwR8p2n/S+e20o4d/jJcgCBZfb6BlOMWf+eypWbQW4w7QJjldbFQ8RkAigtYwI/0S26bDYp3

cy+U+G/MyvBF41Zd4dh/uP+heR2A2JTBjsSOPKa+qXZBvyeVRR7XPoO4vs0jdU05MJb7y05CcwSzdJ54

D0Ruf3IWSul8r6q+S6ns2O6FcmrDzj9MCcnKjk40K/
7FXS3jkiISGiMhCt99/JUjPeDNx+ajcxmlyF8VV59rRq3Je2VXeSgLN9SQWiYzcmaX+f6MFPKEgan/bt

KwkZRFU1EtHWKJn2XXZ0zfVjrEcY7swkPY5yAZbvSuK+BgbWwbpBH73corgxxeTVq5hCXwzYVBmuCFFO

WIfoonTaGdJwFtcrYDfQhp3G3M5wWhCVcwQ2RgSAMI
P/4SvB1BemQ6uL5mq0lnGQ41XXIyx9re/SzA3PgUCSp4Goy/y35ZxQrMkQu3ZRZCFEpfT2GYa1PGFlkW

ls3sePLvYhizdeLZXJqaI7CRzAMRtTX2WMBVcY+ApiJ0Va0eDi4g3GKyeu3neQ8lTjPhEBFTRdUod3Xt

dD6d/96BwG4JqpEa2Rfl6uVSylIghBdKfF3qDtwWoQ
zmxSiwflkzRmJ9IIYzIi34sRZoByyvFgTtz8NVs8phLa1LcJTjrrSfwohiGY+vLi4ipi2WkGQ/jMZTPU

Yqrmm89V9BTvpRtXHnEzBVihh5GLQXEITEFXzZW1VQtRkuthf4ma1QoVcnvd9bk+iukBU2RFNv9jDRNn

+coOpiAzQOrYxDlZ5SH/M+QWLo/28C6l3DCBM7vwRi
b3r8Aj+6ZPx6VeavEZYfRqn5MGQz77NrOeNft6kj8jCI4HhFck9yveNNQFZWjIVvd44pnCbpw5Wp7zVN

jQxRmm1j/JyZVTjpJRZlLfBj5CgBUiSBiMP7tm8RYsKSnuzyi3mPUysBhp8aoi1UdIOJB7bI3ciUSYD6

xeisEn2MnbndQbqvPmDbdtY+OBhVfpgy5HbpRXt5KR
ADS5UV7xtK8kS5dnLBSelXjVuSkpY11I6HNla3Uo5Q+YzOhMDLGsYLfNR3dOqgEArO8ztWvTL09wGQqU

5nyAcrCbPJdCznESSnzaj2in8+bV8MbrRU9Vjk80M7l6kb7gXLkG1IorH+kcRr8NCoogvEfQWzj9u8Si

q3lxN8INRQ696yMXMllp3ATCir0LImK2j31SwY2fCf
O8406whjuFM8PnfZS2GNl2N6OXy41gsSoU0l1bEB+ajq0oyW9JO372dNi5mq/fpWIumsumLZh3INd7IX

bivIaRiRkmErONVn620WgEXQRmoT0n5A6pXTDF67iPcMl1uqESLmR2tT/AWMPzzeePQve5WurnUWMzLt

XZzn0YvZPOYmDN3Brn+G9CXOjcc1M/4lyFb63b85n3
qLWG4lLSYvh1EU4houZVj9+0YOAekFysYKeiFl86XVbgicfyR0O+FUTh/w4J6H8ZvGgYZBSdF5GTpqnD

JpyeKpF7hhsogKwR+L12KcDSxwFSCIKtxXguC6isGX7XpLJ/0NLdx9P/oqH7O9FJGInx7Nbz9+IyAPzR

qzm6Ki0ms+3BAhb2kLXhgjPVo+FfHppO9+z4t/SYSn
hQ+v2y0y3gqf2qYqwY+LsRv8urGV+P0QDHQZKLR38/eSkKTSLw25N94Bcrz56Nv+7Lb+1HvNnzzuGdr9

cq40ST/zeKkKtkkcpJa939uw03D3XZRboGKJsTbH4ML/JLIECbPRqKlSQM4CcJ/2cHZt4XxCM7u6nAt+

gztJN4F6h+jdUnM1SP1EHaver7wOVWpwUWgiIvLmEn
rS3ROerFuaVkzgd4wgC2ZzL+jEKLfB23+bjS5Bar8In+s5RqmTyt3jIwIYTo1CWkCEHhMtkSDh2thJmh

gdKra9vKbEsuGxxT4YMtBwKTtZt1UfTYAicHu7+uXkIA36DiTq8Xbwlj4x51pKVZkrItG//Md//Md//M

d//P+ReoMPliMRoOqxHwI6zmkLtVdVYmSaUONfgS2r
Np51l6hxcAKTUl3Gc24eouMk5wcGTKnaJo84LSO9kYa+XVdFAC5foOatp6znXXwnU41NwiW/YB5+4Czx

vlc7Wy/my2eoJhZr/XYyhIri51Zmjy6qruITOUtQsggGobwvtTv5EStGjZqotZjN9gSsnN66iqzFhDny

m26U88BIhG6Cp6g/d0GU/MeHG6DsWg/m91Dh1uXeTh
x1mSSWxqEgli29VTUfUBIm69dcjRFg1d6pXaiH8Ohm/OsWksKLzseEKJ1CHZyr7A4IlLaJC4a3327iqR

U/asb5pxg4qi+17r+6S3DhhFJhgjdCoj03Oa8fh99nmD6fWff4x9uveFae9rizX1qE2gQ+Xf1JngDj60

Ax6J96sG0urRlsC9gx3dFxaalW4k/KQ0KoGkiHoABi
xKwH0czluBvE9aBIjezBhfDF58JQAYIyldNtGZFjJLkJ3E9Vpj4d9MhvgQHGoeqzC9XIQl7tmwehA9Cf

UeTpnx8jrwZKM8LTrl6vejHwaGeeM9mad8yQr8Erx+awBoU8Bw3yuOn7Wi8ZGfXzq7tVx7vYfW0wpM7z

Ol2LckvZUFhyHYgSNeylvwnax3PNFzSQKnWG+N76W6
wOIlLGxwAA4Kd2V/1MPNV/AZt4bSgUSqNCiqK0sAD1onV5I9598oGXSgmKbzpmq66lqzoEiUKhRPN5rD

0QxbKpfVZw/4wNl2wAjhfaw3h9p3NkUJ9xuG5EM0kiQvPzY9Y9hqPc1IprM1R9qWVHtWc70TEojbNeal

BbNXfkmGmNJJbDDQKlNAnQS8PmBR+sfwA8hu23dg7i
30AxarzRuqV41Hn1NgC2m86AgSwrc2aM7f3NXDY21MXfDAbJmyqrvEgZKAVwRrk0craiqk1y12yBLozD

fwgrPGj+5Z7ufWCVeRdnrWsbPB1Ssef8hFrrFyVm6stGG+3r0v2gteqNIMLCj6tRA4pFjVSMfHSmqfp8

U7q/6Bh7HPfprtc7G0huaBhslFwh7TKhCdtL038WBn
1XHd4xKElfwhG78x/Cgk6vU4qDVV6/rW5NKmat8ThHETmx1bb7PtoHGkyYxmkyaL83t+23OUEurWkqwT

pdaVnAVn4NwECJ8szDDZzaKnr3TpsIlqUZdNfhgbF0wjOJ5qrDlqpQfeJAf9Blc4p0qSB88ZbJjT7F9D

hpMroPYqhoM7pKgmBy5RQ7ABblIuND2RfDCtOVCjSB
+06VDqDaoS0pdLh8VF4Y/2KZPPRBDI+2PgTBrrhAAR364nVk+eJW2sl3mKOxTbVYR4wJlRVGurHm+syB

RBWIS38lAQWiPSSmx1YjvNnhNoH+rhjB9P2vm4yge+m6pRdXNUQJrTWF+mgmVdTJRECkUftBYHsCe/Dd

0xslexT3NKy5G21coP3TrLaT7EHv6/cT4MyVNRYCQb
8lVpV/EYdaDoPcFftpklwMaf6ztZtKcBPx8MQo4qWzebkWotZR5bMI1qWsAi5AFAFLM9HeXhHBFFz/5+

knnO2bG/L58gz5Et0T4HgG7lPZztXQt0YtlDfPvL6J9Ovv33sQbj35TmBVFx/f8UP+ohj1cihUDqxTVh

kj2pSruSMHdbsnK4/V1KeHLmBTjMRatxYC+Vz5H55J
VJxNwl/5D/fRAJl4AfV3lGfMp873vjYMg79CSSqyHfSlDH3EQm01rRd2L+Z2uoV6qJA+lOLRYxYRTeNn

x83Ofq+lxXO1d7i6/psz51ncfYmfuDz90El9jIQtPdREzR8LoCqOZrR7/6Hlq4wB6kfwvPrdR7Vs9eu/

ilcLJhNukF4piOFFAqs9H9liJ2JEX6JO3qKgsHy6Kb
Tzqr954uD2/M8HTCZ5Bjj2emxzTynpmlxw+9vkaWOwy+XrT04y3aFBAZD0GY2FaYpPZ8WOqiQ7BdueJ9

FhAd9qoCTx7nTE1kweOTkvZEs3r/1fwTwdVY9nuyE3p8qpS+F2sUK0CdMA9KAfiBZKONfQoY3mPsW3gL

eMu3RVGrDWVlJykokOG3m1Srl7hJqc6Vx3W7pdhp1+
HGMk06XIVJLDWdeIHiK+NwZ3Yf06Z3g8zwR6lwDzk/wGzUKmBvGG7iXItS07GH2wskr5CA7HjaI+K4Ly

HbYnjZTeS3SqHbo3cxaVewROGI1Y11kUg5D66f4sijphsFQFCA1OuJtFd4uHhkxYHyfe0+qcgcM4vSRl

zzRPSd7QzO5pHcKRAcnMstr4c5Rs6y5Hl7sxT2Z+KH
dcTY5Xc2ZTCTYxO1DNSMjYIAn0nXPip2opdTlScJSxkrRK+qedUAWNTkmsTq+pNTgWsEG1x9ieHP3p/f

BtPktJ3l5uPoK7XVTAjZgmO0tGQPZT57EvvDVRfcW32q8EgBYpsqzmMN17S0suJRdX9EQmdw744P/SdA

+Kfy+UtmS4D2G2RLxj57KKxwsT0UlsnvU2PbLbeHlw
6/EiipE2rFGOm6hsZQSmyw4AxVhBfgsrK5g1GwZLi0+ssCHmqt1n/RIkigGU9UPeQMWZG3cPW1/2TlJB

GzJtC0Tq7qgzJ8wZGiPnsj5PvfBe/mlscbbB3u6jAkHiClLvWDL5dKmuiHh/VZFRCqgLP+p2AKeYmBNK

0pFfxlGFk1xrj9RaeuI/0xBiHYjakGUPCujYe7Awhu
Hwduga23sSOe+H3WRj74PQ7zYE405gc3berHpFOUZgM8o9pUGAKDFtpxkYUOsv4grepR4YqCYlUs2IWx

GoVlt+3oh3UuT4xKHbwi8OJ/+nFMvPtjvG8zONmZhGbHUWhehrhZtc/RGwm7t5A3k/G2hgU5Xv/fzD9n

7z0ALaOD0QAHcGGHE2Em2I2At56KgU3hW+NNpVGQvz
Zoz3E++vws4hSawxyhqXDasd7lHXdrPyfF6GXpieOHR2GNw3guEyo5L/rViCLhtX/yopeT4qw8fka7es

Vl+oNlxd8WG1NxGb1xcoMxNyrM56mXQ3xUgHGeT+6A/iGABGA6ibTnKyU88vTCMl7cARxTwPmmogwqsg

/z0uBDxhadtjZgEvNhPfKFLeokKS0iRJ+U1pAQYxWM
bpPjVcBqmTgbPd8O018qNObZtDscvY4hu3so2VtcjoSBaI82b8WjK5qU1UFuvu3KsZZgWXaBZ38DFbs8

BlqGuHLfzGts7cZxy9/WItSjrn/jBDyHB7wjppxfruX52rrwq3j8HQxlNpPs4++V9nzzBvEi52NbI7Bp

kFG+9n/EyJA46z2nGrpA90gHz98Y/xYEOqOK5LE7lM
n+ogDHeF3x9SQp04Pt427g6xO8Do+KoJOiIQ5osMTjky3EgqFAb/vtm2aaKiqGi/xT2S6KDSXvkd8llD

pk1E3x/zCTc2ULuS3tuYKCUXKiLHmWc1bnKXIV/rnfrnyQQaOvn1qIBH/AXonOk3MbIDK5C2wfCKOup5

23aex0dhQRIUGxUo5GQtzwYDJJ1rKO9ss1wvqwKKCj
79bAJCXSgMivP9bdjVxg14xdpwwpljBczsrdZL33CpYlzBbB/CSaFmagOrpoXLYhNM3HRZaQqmWiNK9k

VQXbGJ9iICMRMBoRyH1QTCjqZLNVjWlvETRp4emYjaWl1WfO2IUFJft4gl2edVbC35YX8RRXlAv8sf5A

MR4FtUUaM2N86UQqGypOHijH4ke4Sr5/22czXDPXqQ
P5sDrwS7xR6oCX5WVnbgPsa0L0L7AJdh+C2UzadNvA7FUm7jNsKOqmioGfvCEZcf7O0+EAgto7ZpDkds

4ntsVb5gBzHzF7WS3cRl++f6l9rNC+7ynBzim+WpQ9OQBI3W5FfiNxX6yOFPastdYtcLGSl0mIx1ICSf

b/bPEgLrpYbil0nYKvo1oKJNEgAHU3ASZJ7VYvWSAy
E5Q8oMCckgvAEehUxhH/Hke/lnpD5ymIO6tRLr5+u83h9Z4RsQvlpc08wqpIa/FUfs7k0je58Sr3smwy

+td/T59l4nTBUzB2Awx1I9ml7mTGiK7fy6cvCPgZ5gWY5dpvqrO0IuwMEJleeatPbX98wkcZcV4/Vsxy

oGOXrBnzIIDEBQvrMmfYEsdmJatP75NRUmEqEt1yNm
Wjo48nbhDI6ZSx0uzMhQ4I6xOG1X7yD06NZ2nsjk0vsHIFOXQRDtuT6Xc4oOBXQQRGuguVFl2dzt5Fb1

+vtCwqi/J6wHwLrsrsZe+2WeRvs3i8KCO+/YNtqSeFkHxZanyohWN/72nLVVP+Xn11knOUY+nnKbXVf0

XUHhydaW1wh9CJ7afAmbzEHBbHt+doignoNQmKb320
H7iKjPJlGIJgzmMCptysPI4gmatCMqARkcezGqDQn6YFX2ZuCeSw34mGrbWFU8TVgeWYynyjKqyPBHxQ

rnnSt809gp4uWn8LEZKT9FsKr9G4vmY2+by8gACl3T5Ea2+8bwvgLmXd4uAZaF1YD4HSklIdk2m4CecP

tblYSIBXYKkNtEJNcjkVuAjQyeFrse9yEefsxBQ+Ti
QJ2/nIERXbWsMDP42XiyFcZgwEni+BxeeeUDezFcgOiez37zp68Jz3pXf5RMgXQo8877oDp3P3Si0ihD

NKiBBNnZSrUCiuaVAfbqZy0L1L0ad85KeQNdT793/t0KdhG42NnHUIGgWj2ZaNi2/hMMOdfLl/Yq47BC

/oUPHsffucxokdGhu2IdL2tf6mTvzeyLof3wazh6Cz
2YvqCoh9vgObzc69r2ypocDU6+rfSVe78D8CCqkK975YJ4/YKYh3wCQi6dVwMNqtEhHvwTbdfiXGRNl2

ihl/Un42dADnPS1FBWhtFnOgDzUCr25vIqVNwwf/nng7SHLL/M8//vO8M46JT6xKZds9A+YJ6DUCAhcG

nbWPpYAY0Br/TRh8/c9XvMrdJTLe9Ff7tUx4f8LZoB
YRlzvopZENZWYGqDmHO4KvzuGYXWDPJ4vK2I+ZZDuePWYdj+vBGePg3rC+YjFieM+dDdojoOhxPay6LW

AvxHTe+nDOH+A3riW4N4MN0V1kVs1r/nwsbpPYWqlhe9u8XhJsCOQJ164E0e0uDX2JSkOaiHEAXmR1/+

RwnUgy0XBj1rh8fMQ2/AtC7brdyJxkr0nbO3cViC6G
/hCjP1Xw5wbZekZqE7mv1zq1/WbzvTv3k7CHGG7VZQSstXe1q8o4fxEOrg+HewYgPVPwrQtQgFXVdmPr

KY3aH23rAt2YUvsGUhWJXLlYdkjCoT5cnHmGjk7OvLTrIcVfG0xBSpW5JuPaj7y6jMPP+YIMiXMaiLwd

FVtbGA3AxhAE+6P3kVZ0yyepHinJlqqRLE4e6UZ41y
Ey/6BqV/5uJZ3WXvX8t+4Xcg94poQLU95W7P4qV+e64Uq1to8TflrfyuGpj/vTzY2KI+pWOHxan3ce3k

NUWZFotVMr3t6CupLzkJrWXn9Sp8D/OZ0c0w/cCnN3x1jpCnNh774enhnijIruCeVRy0yy+CfA33ocPY

NV1uMxhJAugwz7B7SeS8NxM4wsi42afoPFguoemqN5
oF7c+Jg3tt/dAhaYWQVLWrPD/JsBlSw/is3kP/FNKJ3tR2I2rfAn7nbcc71/Kjz5jeAIOTDhtzwI91z1

ClEAFnAbM7I2aJPHSCggIVLlrrQVao3oQNjOIigTFMZnu7gQHiBt5Vt6KTnRNpIud5MKLe3nkycbP5nD

/eArJbZg6aTxs/vDyOX8x0K0Y+AtBja3eFWe1URyZY
puPHsgR/DpgcAWrKngYtzLJD6uxh0ucn29S6875159MuTLn4fjZsP+aDFVtT8vL0doOlv1JNPW03CNGD

FZpa4Pl8IXYMlar1PF3ys/ttCZjXGCZaHvucD1n8IYYSocUFx8tyIjJ2gz8tnQdbQTL0UXBcGsPjOl57

kyDWwWRfDDf9xnLPpobNQnw6PwxaGIbyBDfYBTDD/G
HfKcumr0+oOv0qtaJW8AMRdjXf22qiQ+UyOLNkxQGez3Kqr+06osLtLtshpzC+xfoJskE/28xPyvJg6Q

h0ETjl1PFndqO85eT22ac2bTJRBhbUcJyJBOgzdLbxiLaKaNdAfnJb8n3lH6KvNJ3xc/XR/0/+2rexee

Wc7SKlhCI6+BwFkI61KILzDllFDswFqjYOkf4ejQzY
YTTgHQkCcktUSZ1yMW4+LQK3alEirdA3M9r5umu1EB6eApkP7EFXEQWuNQxCfA7466OkZKghIR4MWbXH

lQqcqHnwlR1166bG8ES5hJo1P4bQAvSGoNI6146FA+kiHhDCUWoCAVFSv8mkM1yX+eOoJR2a3upILV71

YkR8Vx1znzvzIyJIIVJ6GrubRoUgeJecSQA0pOjHsC
zrJyodz4GrNbq/wjWo19sC0rXlg+jThdiE6ahUbeE2tUvr69CiCGuv7NWyLTeHZ8d8fqfWlUPT15WTrZ

h/nYKyrwh9BFSskWSLPS5ODhMNVJUSn/bjhzcfuNC+pqWvXLejRKAd2nTbDYEbR+LkPTHCwJ/ARBXu6J

o4KMII35+JU+KZjiKKzQvhEYf9Qc7oC7l0vIecaDTH
JiSLvMpADHo0y5B/26SOY3PCrPOP5bfTv9GeNvJ9w7+ZGMkMwcX019A3PmcQ/xd8ppXson0IBKyCnV5o

HFxQv31eav9vIWqDlnsP5lJz+Cr5W55zR8GUELci32TBKJGhzXA8cRRoHtOUq1XmPmTXVIqjOenY1cnw

eAV0BLnpCN3VC9cPtRrm3HTQRDvt29mOOsIR1ROfK1
96Kmk1KOUSUXzSCHxLbb+ys2vMMKg9y4dOyAgE5aR/DrEuwJmQ/Ay910DZh1eGe8ifIYFpMQLHeXYiEx

BhcMujzX3hIPAZs+9MfOG8+hQ81gKO2LLmIHquilGn4wnpLeOKxnDaLZEP1J39vqlDWS4BOjCB/Z48an

55ABvja25yCYLFEH8zEcw1phFCAHGiD9Q+Lsy/Qcmp
PNAi/F7lv2ic7I2cpdWvnxMnJBclBPjUaCR6teo7m74aBzQ713lyzIOE3Z5QOKtoiNuGPY6f8l+uFQU3

bPuqwzUsw00p3UI/OxqQNA1oyuVDOtrR/lmaC1NjiQu8dQSOsehkpFm5hD/IfOOi7awxXXlmxnYTa6Rm

a230zN90sz+ZfJ/hDDJnMl+PU+39CDbqVBKUj++/f8
j3rsQFqymbnOa4gRHLPPzdwBfdNJSlYC4S5V92p4fXDhqTfvQoL36Rm3aiBIjBwvHkzvPgbnVBnC+2gz

ThlXKttLH9M/vnB2zw6qCG0TSVjbtwFTLfGn0Jqn4ZZwo97FnvHLsRs5BKOI4cdgdLCUmB5aXam0eqKM

BImrDP6Sbim4iZGdlTMNDrie5l1SYK6wMC2vnUMHyO
1Q7fq9/OsHCGukqU9+56lYeaRCzBDMOQKZjjWQcPsgNPvbRo8KwsYKMAoe9yBe44LHSw2eQv6Qpg31Xt

vmAYOKgv8P5qGqWlWFhkKzY3OHMfnC89dK0QiT8O1FrAP5cUp3j81G+3K+zvpAn+7enx1lLmkWh4VMdS

0rRIM7KL3feJfw2KOf+Mq5qNpKOfJR6k2p0BHnuV80
8G4CGz/zpdY/JrazaeU8VwfilGZPQdr9HpAupNDflOBVi013jjbpdJHeb222evZ94hcvg9UJg7wmk9tp

nM0OTPFE5HCA0uyyjbVla7+wayCea8iKWwTTVeqDJcb+fA/hhd86kOmusGT7uWWJK5+hLw9KVkoNwB4i

cPNCfUTlk159Z6ppbynJEHB8b4ktN5loOI2wZ4p7/G
Vn3HyImoPWW/Hmp/tacZJa55aQVvF+5R4JPQvUsRdVMAUQh0ZZJ80Il/cKjVf6sZIZRXVGAtoDPw61wy

ihULtA6pjx3Ohdd1QtWnoToJRrTCyNDMA5HY/QnUWlu6omkW2Z77E5OMAN4GC7DM2/rHkZSFiCH4A4Ex

UQfTv/kvYSXkGvAdgfuACRE8tpvGoUzAMLFQ16EQBT
bbzoiju/nx7kddK+EGRkZdYfkTgADyXRwhVc7CJM5lghMaosgRNDlreRfNyJZC3dvplbrKDg/ih4Wj9Y

aFtne2pMzGx8+c4+ueZiKkFDQ8cb4074D8hcrLwhnL/TjuzviIS1lhuAkElT5XU7me7r3efD0Gq2H3Cb

LJr3fuasrjD9DquOgYsoHnLZTI6oHoV7llmnlyi8hM
+Sz5Dq8zcY86pbfj1cxRnKLw8RgjvkFlJ8R6nDMQ3yxudMD5jMGHH10vLXfxr5uZHxVaT4OCdR12VG2V

NyJ8CM8mip0IbxLFQiJmyYhva5LpjA8AZdWqdbI2ThBcX27JDK6UxBS8zlGH1FZLXKhPP3v51BBPWSZO

jU4RwT1lPrxxcXttyBxFhi94dE7Ps76V9UzzJIQloN
Cg8c2BXUbKrmd6KZGGNBTPjludKp83Wv4AKudD21ME5g0CXAoneXjF6XJOLft0zWcV15IYCz1+Q3mi+e

S0u537jcyeVwHz3C49woIKcuVJ2M3AeuZob8ZLzeQKMaZYgNvkk39D4k1Ffp9MIKBDC+ipGYGLBMlgUW

39ujl1dcWDxnaJNAOtChkWb4FaDphjtxzZWSoQ8XYY
lSroYmk4iea8uOn0enkWBW0b/SN2vuhGYiot2OqXzGCSyE3FWuKaaEqd/hIov2R0/ImLYcArqeS5joa1

xj476zWDbpe+yFzI58pNT5Tqj7jumUBFPH2vVdda6IOKt34p/FoX2MzzMORHLRyjco215nExqqNdmYr4

rbZ16t35KWnGJBIdbmgEYbtaCldozsP9oX1sxcL5ap
QFoN5fc5tbVQMsrkwpT1Irh30uem4TPPf6QhYKzrPqTe5UP810UJ8BSlWAWr65ESsGs3Xsp0zOeHSghs

xrPFRbGtibLLtlrns19LIDSI2YR3xYa3DRho3ToaSNqZ8LzLH8Qx8Y6IxsHQxEvJfKW5F7VsrhiRQ1yZ

g/5f/1I/yP//g/ZQtb4b9NDruiJr0FRJ7pt2MjTYXh
YKTiMF5kyj9mGgViWE0ftm21NM3KRB7vnTuyKiP+DdT7zuJytB6ZhVb4XOMiHXMjVHf3TaEsPMBdQ63R

T2jbCyKkSN9LMH6JLU4ru8PiCUMbEVNdSE5qem5kAnWpIB0xuq82YS6XfHi5QTEsF0UaMHPaOSCak9Pn

H3xnlqw9tEL6UQ1VEFWvSBK6KqK5Z7CvCEI4FMEtFh
UQZvPKCKy0ODCZUkZGDdpZRcT0PXXjZtKQIRTWDHcFTaWWVfWROZSEWMTKU8MMM0NKB3V+QC0Ay708YN

KsJYCWD1hcZm9lKqOjLHFcK6z5KBYqKL5WkEFpQQ1STsGcP4zcCiZwLTJbQR2+k3HjCLDzLXc34zImHU

XqRKGQvude9lSOwvNCtMIy3oKAPVlwXEGxfVJNSwER
+fMLiPxVCCYPAUneP+usunuDYPGGEkgYFiMpVQW4fpCnxB4MCG8ai8RkFY7Zt2AgzqQ4neGdREk6DIO3

JoBBFdOkPB0Z









                    ID                  Date                Data Source

 

                    [id number]          2020 04:18:00 PM EDT [facility] ([facility])









          Name      Value     Range     Interpretation Code Description Data Felicita

rce(s) Supporting 

Document(s)

 

           Laboratory test finding (navigational concept) 43.0 %     38.0-51.0  

                      [facility] 

([facility])                   

 

           Laboratory test finding (navigational concept) 96 mg/dL        

                      [facility] 

([facility])                   

 

           Laboratory test finding (navigational concept) 4.0 meq/L  3.5-5.1    

                      [facility] 

([facility])                   

 

           Laboratory test finding (navigational concept) 140 meq/L  136-145    

                      [facility] 

([facility])                   

 

           Laboratory test finding (navigational concept) 101 meq/L       

                      [facility] 

([facility])                   

 

           Laboratory test finding (navigational concept) 4.6 mg/dL  4.5-5.3    

                      [facility] 

([facility])                   

 

           Laboratory test finding (navigational concept) 14 mg/dL   8-26       

                      [facility] 

([facility])                   

 

           Laboratory test finding (navigational concept) 0.7 mg/dL  0.6-1.3    

                      [facility] 

([facility])                   

 

           Laboratory test finding (navigational concept) 28.0 MM/L  23.0-27.0  

                      [facility] 

([facility])                   







                                        Procedure

 

                                          



                                                                                
                                                                                
        



Vital Signs

          



                    ID                  Date                Data Source

 

                    UNK                                      









           Name       Value      Range      Interpretation Code Description Data

 Source(s)

 

           Body mass index (BMI) [Ratio] 45.2 kg/m2                       45.2 k

g/m2 [facility] ([facility])

 

           Oxygen saturation in Arterial blood by Pulse oximetry 99 %           

                  99 %       [facility] 

([facility])

 

           Body weight 304.12 [lb_av]                       304.12 [facility]_av[facility]

 

           Body height 68.75 [in_i]                       68.75 [in_i] [facility] ([facility])

 

                                        5'8.75" 

 

           Heart rate 89 /min                          89 /min    [facility] ([facility])

 

           Diastolic blood pressure 82 mm[Hg]                        82 mm[Hg]  

[facility] ([facility])

 

           Systolic blood pressure 126 mm[Hg]                       126 mm[Hg] [facility]

 

           Body weight 135.173 kg                       135.173 kg [facility] ([facility])

 

           Body mass index (BMI) [Ratio] 45.3 kg/m2                       45.3 k

g/m2 [facility] ([facility])

 

           Body weight 298.00 [lb_av]                       298.00 [lb_av] [facility]

 ([facility]

 

           Body height 68 [in_i]                        68 [in_i]  [facility] ([facility], )

 

                                        5'8" 

 

           Body temperature 96.7 [degF]                       96.7 [degF] [facility]

 ([facility],

 )

 

           Diastolic blood pressure 96 mm[Hg]                        96 mm[Hg]  

[facility] ([facility], )

 

           Systolic blood pressure 154 mm[Hg]                       154 mm[Hg] [facility] ([facility])

 

           Body mass index (BMI) [Ratio] 45.1 kg/m2                       45.1 k

g/m2 [facility]

 

           Oxygen saturation in Arterial blood by Pulse oximetry 97 %           

                  97 %       [facility] 

([facility])

 

                                        [facility] 

 

           Body weight 303.00 [lb_av]                       303.00 [lb_av] [facility]

T ([facility])

 

           Body height 68.75 [in_i]                       68.75 [in_i] [facility]

 

                                        5'8.75" 

 

           Heart rate 83 /min                          83 /min    [facility]

 

           Diastolic blood pressure 80 mm[Hg]                        80 mm[Hg]  

[facility] ([facility])

 

           Systolic blood pressure 122 mm[Hg]                       122 mm[Hg] [facility] ([facility])



                                                                                
                  



Patient Treatment Plan of Care

          



             Planned Activity Planned Date Details      Description  Data Source

(s)

 

             montelukast 10 MG Oral Tablet 08/15/2019 09:06:21 AM YESSENIA GLASGOW ([facility])

## 2021-02-11 NOTE — CCD
Summarization Of Episode

                             Created on: 2021



ADAM HERNANDEZ

External Reference #: [id number]

: 1990

Sex: Male



Demographics





                          Address                   [address]

[place] [id number]

 

                          Home Phone                [phone]

 

                          Preferred Language        English

 

                          Marital Status            

 

                          Sikh Affiliation     NONE

 

                          Race                      White

 

                          Ethnic Group              Not  or 





Author





                          Author                    [facility]

 

                          Organization              [facility]

 

                          Address                   Unknown

 

                          Phone                     Unavailable







Support





                Name            Relationship    Address         Phone

 

                    ROJASLELO NGUYEN Next Of Kin         [address]

[place] [address]                    [phone]

 

                    BOLIVAR GARZA Next Of Kin         [address]

[place] [address]                    [phone]

 

                    JOYA HERNANDEZ   Next Of Kin         [address] [place] [address]                    [phone]

 

                ST              Next Of Kin     Unknown         Unavailable

 

                    CRYSTAL FISHER Next Of Kin         [address]

[place] [address]                    [phone]

 

                    JOYA COY  Next Of Kin         [address]

[place] [address]                    [phone]

 

                    TAWANNA HERNANDEZ    Next Of Kin         [address] [facility] [address]

[place] [address]                    [phone]

 

                Aracelis Hernandez ECON            Unknown         Unavailable







Care Team Providers





                    Care Team Member Name Role                Phone

 

                    Elba Marie MD (Jack) Unavailable         Unavailable

 

                    Elba Marie MD (Jack) Unavailable         Unavailable

 

                    Elba Marie MD (Jack) Unavailable         Unavailable

 

                    Elba Marie MD (Jack) Unavailable         Unavailable

 

                    Elba Marie MD (Jack) Unavailable         Unavailable

 

                    Elba Marie MD (Jack) Unavailable         Unavailable

 

                    Elba Marie MD (Jack) Unavailable         Unavailable

 

                    Elba Marie MD (Jack) Unavailable         Unavailable

 

                    Elba Marie MD (Jack) Unavailable         Unavailable

 

                    Elba Marie MD (Jack) Unavailable         Unavailable

 

                    Elba Marie MD (Jack) Unavailable         Unavailable

 

                    Elba Mraie MD (Jack) Unavailable         Unavailable

 

                    Elba Marie MD (Jack) Unavailable         Unavailable

 

                    Elba Marie MD (Jack) Unavailable         Unavailable

 

                    Elba Marie MD (Jack) Unavailable         Unavailable

 

                    Tin,  Elba Sonya (Elton) MD Unavailable         Unavailable

 

                    Tin,  Elba Sonya (Elton) MD Unavailable         Unavailable

 

                    Tin,  Elba Sonya (Elton) MD Unavailable         Unavailable

 

                    Tin,  Elba Sonya (Elton) MD Unavailable         Unavailable

 

                    Tin,  Elba Sonya (Elton) MD Unavailable         Unavailable

 

                    Tin,  Elba Sonya (Elton) MD Unavailable         Unavailable

 

                    Tin,  Elba Sonya (Elton) MD Unavailable         Unavailable

 

                    Tin,  Elba Sonya (Elton) MD Unavailable         Unavailable

 

                    Tin,  Elba Sonya (Elton) MD Unavailable         Unavailable

 

                    Tin,  Elba Sonya (Elton) MD Unavailable         Unavailable

 

                    Tin,  Elba Sonya (Elton) MD Unavailable         Unavailable

 

                    Tin,  Elba Sonya (Elton) MD Unavailable         Unavailable

 

                    Tin,  Elba Sonya (Elton) MD Unavailable         Unavailable

 

                    Tin,  Elba Sonya (Elton) MD Unavailable         Unavailable

 

                    Tin,  Elba Sonya (Elton) MD Unavailable         Unavailable

 

                    Tin,  Elba Sonya (Elton) MD Unavailable         Unavailable

 

                    Tin,  Elba Sonya (Elton) MD Unavailable         Unavailable

 

                    Tin,  Elba Sonya (Elton) MD Unavailable         Unavailable

 

                    Tin,  Elba Sonya (Elton) MD Unavailable         Unavailable

 

                    Tin,  Elba Sonya (Elton) MD Unavailable         Unavailable

 

                    Tin,  Elba Sonya (Elton) MD Unavailable         Unavailable

 

                    Tin,  Elba Sonya (Elton) MD Unavailable         Unavailable

 

                    Tin,  Elba Sonya (Elton) MD Unavailable         Unavailable

 

                    Tin,  Elba Sonya (Elton) MD Unavailable         Unavailable

 

                    Tin,  Elba Sonya (Elton) MD Unavailable         Unavailable

 

                    Tin,  Elba Sonya (Elton) MD Unavailable         Unavailable

 

                    Tin,  Elba Sonya (Elton) MD Unavailable         Unavailable

 

                    Tin,  Elba Sonya (Elton) MD Unavailable         Unavailable

 

                    Tin,  Elba Sonya (Elton) MD Unavailable         Unavailable

 

                    Tin,  Elba Sonya (Elton) MD Unavailable         Unavailable

 

                    Tin,  Elba Sonya (Elton) MD Unavailable         Unavailable

 

                    Tin,  Elba Sonay (Elton) MD Unavailable         Unavailable

 

                    Tin,  Elba Sonya (Elton) MD Unavailable         Unavailable

 

                    Tin,  Elba Sonya (Elton) MD Unavailable         Unavailable

 

                    Tin,  Elba Sonya (Elton) MD Unavailable         Unavailable

 

                    Tin,  Elba Sonya (Elton) MD Unavailable         Unavailable

 

                    Tin,  Elba Sonya (Elton) MD Unavailable         Unavailable

 

                    Tin,  Elba Sonya (Elton) MD Unavailable         Unavailable

 

                    Tin,  Elba Sonya (Elton) MD Unavailable         Unavailable

 

                    CONCETTA MINA JR, PA-C Unavailable         Unavailable

 

                    CONCETTA MINA JR, PA-C Unavailable         Unavailable

 

                    CONCETTA MINA JRC Unavailable         Unavailable

 

                    PICKERAL JR, J ODILIA PA-C Unavailable         Unavailable

 

                    PICKERAL JR, J ODILIA PA-C Unavailable         Unavailable

 

                    PICKERAL JR, J ODILIA PA-C Unavailable         Unavailable

 

                    PICKERAL JR, J ODILIA PA-C Unavailable         Unavailable

 

                    PICKERAL JR, J ODILIA PA-C Unavailable         Unavailable

 

                    PICKERAL JR, J ODILIA PA-C Unavailable         Unavailable

 

                    PICKERAL JR, J ODILIA PA-C Unavailable         Unavailable

 

                    PICKERAL JR, J ODILIA PA-C Unavailable         Unavailable

 

                    PICKERAL JR, J ODILIA PA-C Unavailable         Unavailable

 

                    PICKERAL JR, J ODILIA PA-C Unavailable         Unavailable

 

                    PICKERAL JR, J ODILIA PA-C Unavailable         Unavailable

 

                    PICKERAL JR, J ODILIA PA-C Unavailable         Unavailable

 

                    PICKERAL JR, J ODILIA PA-C Unavailable         Unavailable

 

                    PICKERAL JR, J ODILIA PA-C Unavailable         Unavailable

 

                    PICKERAL JR, J ODILIA PA-C Unavailable         Unavailable

 

                    PICKERAL JR, J ODILIA PA-C Unavailable         Unavailable

 

                    PICKERAL JR, J ODILIA PA-C Unavailable         Unavailable

 

                    PICKERAL JR, J ODILIA PA-C Unavailable         Unavailable

 

                    Eusebio,  Serene FNP Unavailable         Unavailable

 

                    Esuebio,  Serene FNP Unavailable         Unavailable

 

                    Eusebio,  Serene FNP Unavailable         Unavailable

 

                    Eusebio,  Serene FNP Unavailable         Unavailable

 

                    Eusebio,  Serene FNP Unavailable         Unavailable

 

                    Eusebio,  Serene FNP Unavailable         Unavailable

 

                    Eusebio,  Serene FNP Unavailable         Unavailable

 

                    Eusebio,  Serene FNP Unavailable         Unavailable

 

                    Eusebio,  Serene FNP Unavailable         Unavailable

 

                    Eusebio,  Serene FNP Unavailable         Unavailable

 

                    Eusebio,  Serene FNP Unavailable         Unavailable

 

                    Eusebio,  Serene FNP Unavailable         Unavailable

 

                    Eusebio,  Serene FNP Unavailable         Unavailable

 

                    Eusebio,  Serene FNP Unavailable         Unavailable

 

                    Eusebio,  Serene FNP Unavailable         Unavailable

 

                    Eusebio,  Serene FNP Unavailable         Unavailable

 

                    Eusebio,  Serene FNP Unavailable         Unavailable

 

                    Eusebio,  Serene FNP Unavailable         Unavailable

 

                    Eusebio,  Serene FNP Unavailable         Unavailable

 

                    Eusebio,  Serene FNP Unavailable         Unavailable

 

                    Eusebio,  Serene FNP Unavailable         Unavailable

 

                    Eusebio,  Serene FNP Unavailable         Unavailable

 

                    Eusebio,  Serene FNP Unavailable         Unavailable

 

                    Eusebio,  Serene FNP Unavailable         Unavailable

 

                    Eusebio,  Serene FNP Unavailable         Unavailable

 

                    Eusebio,  Serene FNP Unavailable         Unavailable

 

                    Eusebio,  Serene FNP Unavailable         Unavailable

 

                    Eusebio,  Serene FNP Unavailable         Unavailable

 

                    Eusebio,  Serene FNP Unavailable         Unavailable

 

                    Eusebio,  Serene FNP Unavailable         Unavailable

 

                    Eusebio,  Serene FNP Unavailable         Unavailable

 

                    Eusebio,  Serene FNP Unavailable         Unavailable

 

                    Eusebio,  Serene FNP Unavailable         Unavailable

 

                    Eusebio,  Serene FNP Unavailable         Unavailable

 

                    Eusebio,  Serene FNP Unavailable         Unavailable

 

                    Eusebio,  Serene FNP Unavailable         Unavailable

 

                    Eusebio,  Serene FNP Unavailable         Unavailable

 

                    Eusebio,  Serene FNP Unavailable         Unavailable

 

                    Eusebio,  Serene FNP Unavailable         Unavailable

 

                    Eusebio,  Serene FNP Unavailable         Unavailable

 

                    Eusebio,  Serene FNP Unavailable         Unavailable

 

                    Eusebio,  Serene FNP Unavailable         Unavailable

 

                    Eusebio,  Serene FNP Unavailable         Unavailable

 

                    Eusebio,  Serene FNP Unavailable         Unavailable

 

                    Eusebio,  Serene FNP Unavailable         Unavailable

 

                    Eusebio,  Serene FNP Unavailable         Unavailable

 

                    Eusebio,  Serene FNP Unavailable         Unavailable

 

                    Eusebio,  Serene FNP Unavailable         Unavailable

 

                    Eusebio,  Serene FNP Unavailable         Unavailable

 

                    Eusebio,  Serene FNP Unavailable         Unavailable

 

                    Eusebio,  Serene FNP Unavailable         Unavailable

 

                    Eusebio,  Serene FNP Unavailable         Unavailable

 

                    Eusebio,  Serene FNP Unavailable         Unavailable

 

                    Eusebio,  Serene FNP Unavailable         Unavailable

 

                    Eusebio,  Serene FNP Unavailable         Unavailable

 

                    Eusebio,  Serene FNP Unavailable         Unavailable

 

                    Eusebio,  Serene FNP Unavailable         Unavailable

 

                    Eusebio,  Serene FNP Unavailable         Unavailable

 

                    Eusebio,  Serene FNP Unavailable         Unavailable

 

                    Eusebio,  Serene FNP Unavailable         Unavailable

 

                    Eusebio,  Serene FNP Unavailable         Unavailable

 

                    Eusebio,  Serene FNP Unavailable         Unavailable

 

                    Eusebio,  Serene FNP Unavailable         Unavailable

 

                    Eusebio,  Serene FNP Unavailable         Unavailable

 

                    Eusebio,  Serene FNP Unavailable         Unavailable

 

                    Eusebio,  Serene FNP Unavailable         Unavailable

 

                    Eusebio,  Serene FNP Unavailable         Unavailable

 

                    Eusebio,  Serene FNP Unavailable         Unavailable

 

                    Eusebio,  Serene FNP Unavailable         Unavailable

 

                    Eusebio,  Serene FNP Unavailable         Unavailable

 

                    Eusebio,  Serene FNP Unavailable         Unavailable

 

                    Eusebio,  Serene FNP Unavailable         Unavailable

 

                    Eusebio,  Serene FNP Unavailable         Unavailable

 

                    Eusebio,  Serene FNP Unavailable         Unavailable

 

                    Eusebio,  Serene FNP Unavailable         Unavailable

 

                    Eusebio,  Serene FNP Unavailable         Unavailable

 

                    Eusebio,  Serene FNP Unavailable         Unavailable

 

                    Eusebio,  Serene FNP Unavailable         Unavailable

 

                    Eusebio,  Serene FNP Unavailable         Unavailable

 

                    Eusebio,  Serene FNP Unavailable         Unavailable

 

                    Eusebio,  Serene FNP Unavailable         Unavailable

 

                    Eusebio,  Serene FNP Unavailable         Unavailable

 

                    Eusebio,  Serene FNP Unavailable         Unavailable

 

                    Eusebio,  Serene FNP Unavailable         Unavailable

 

                    Eusebio,  Serene FNP Unavailable         Unavailable

 

                    Eusebio,  Serene FNP Unavailable         Unavailable

 

                    Eusebio,  Serene FNP Unavailable         Unavailable

 

                    Eusebio,  Serene FNP Unavailable         Unavailable

 

                    Eusebio,  Serene FNP Unavailable         Unavailable

 

                    Eusebio,  Serene FNP Unavailable         Unavailable

 

                    Eusebio,  Serene FNP Unavailable         Unavailable

 

                    Eusebio,  Serene FNP Unavailable         Unavailable

 

                    Eusebio,  Serene FNP Unavailable         Unavailable

 

                    Eusebio,  Serene FNP Unavailable         Unavailable

 

                    Eusebio,  Serene FNP Unavailable         Unavailable

 

                    Eusebio,  Serene FNP Unavailable         Unavailable

 

                    Eusebio,  Serene FNP Unavailable         Unavailable

 

                    Eusebio,  Serene FNP Unavailable         Unavailable

 

                    Eusebio,  Serene FNP Unavailable         Unavailable

 

                    Eusebio,  Serene FNP Unavailable         Unavailable

 

                    Eusebio,  Serene FNP Unavailable         Unavailable

 

                    Eusebio,  Serene FNP Unavailable         Unavailable

 

                    Eusebio,  Serene FNP Unavailable         Unavailable

 

                    Eusebio,  Serene FNP Unavailable         Unavailable

 

                    Eusebio,  Serene FNP Unavailable         Unavailable

 

                    Eusebio,  Serene FNP Unavailable         Unavailable

 

                    Eusebio,  Serene FNP Unavailable         Unavailable

 

                    Eusebio,  Serene FNP Unavailable         Unavailable

 

                    Eusebio,  Serene FNP Unavailable         Unavailable

 

                    Eusebio,  Serene FNP Unavailable         Unavailable

 

                    Eusebio,  Serene FNP Unavailable         Unavailable

 

                    Eusebio,  Serene FNP Unavailable         Unavailable

 

                    Eusebio,  Serene FNP Unavailable         Unavailable

 

                    Eusebio,  Serene FNP Unavailable         Unavailable

 

                    Eusebio,  Serene FNP Unavailable         Unavailable

 

                    Eusebio,  Serene FNP Unavailable         Unavailable

 

                    Eusebio,  Serene FNP Unavailable         Unavailable

 

                    Eusebio,  Serene FNP Unavailable         Unavailable

 

                    Eusebio,  Serene FNP Unavailable         Unavailable

 

                    Eusebio,  Serene FNP Unavailable         Unavailable

 

                    Eusebio,  Serene FNP Unavailable         Unavailable

 

                    Eusebio,  Serene FNP Unavailable         Unavailable

 

                    Eusebio,  Serene FNP Unavailable         Unavailable

 

                    Eusebio,  Serene FNP Unavailable         Unavailable

 

                    Eusebio,  Serene FNP Unavailable         Unavailable

 

                    Eusebio,  Serene FNP Unavailable         Unavailable

 

                    Eusebio,  Serene FNP Unavailable         Unavailable

 

                    Eusebio,  Serene FNP Unavailable         Unavailable

 

                    Eusebio,  Serene FNP Unavailable         Unavailable

 

                    Eusebio,  Serene FNP Unavailable         Unavailable

 

                    Eusebio,  Serene FNP Unavailable         Unavailable

 

                    Eusebio,  Serene FNP Unavailable         Unavailable

 

                    Eusebio,  Serene FNP Unavailable         Unavailable

 

                    Eusebio,  Serene FNP Unavailable         Unavailable

 

                    Eusebio,  Serene FNP Unavailable         Unavailable

 

                    Eusebio,  Serene FNP Unavailable         Unavailable

 

                    Eusebio,  Serene FNP Unavailable         Unavailable

 

                    Eusebio,  Serene FNP Unavailable         Unavailable

 

                    Eusebio,  Serene FNP Unavailable         Unavailable

 

                    Eusebio,  Serene FNP Unavailable         Unavailable

 

                    Eusebio,  Serene FNP Unavailable         Unavailable

 

                    Eusebio,  Serene FNP Unavailable         Unavailable

 

                    Eusebio,  Serene FNP Unavailable         Unavailable

 

                    Eusebio,  Serene FNP Unavailable         Unavailable

 

                    Eusebio,  Serene FNP Unavailable         Unavailable

 

                    Eusebio,  Serene FNP Unavailable         Unavailable

 

                    Eusebio,  Serene FNP Unavailable         Unavailable

 

                    Eusebio,  Serene FNP Unavailable         Unavailable

 

                    Eusebio,  Serene FNP Unavailable         Unavailable

 

                    Eusebio,  Serene FNP Unavailable         Unavailable

 

                    Eusebio,  Serene FNP Unavailable         Unavailable

 

                    Eusebio,  Serene FNP Unavailable         Unavailable

 

                    Eusebio,  Serene FNP Unavailable         Unavailable

 

                    Eusebio,  Serene FNP Unavailable         Unavailable

 

                    Eusebio,  Serene FNP Unavailable         Unavailable

 

                    Eusebio,  Serene FNP Unavailable         Unavailable

 

                    Eusebio,  Serene FNP Unavailable         Unavailable

 

                    Eusebio,  Serene FNP Unavailable         Unavailable

 

                    Eusebio,  Serene FNP Unavailable         Unavailable



                                  



Re-disclosure Warning

          The records that you are about to access may contain information from 
federally-assisted alcohol or drug abuse programs. If such information is 
present, then the following federally mandated warning applies: This information
has been disclosed to you from records protected by federal confidentiality 
rules (42 CFR part 2). The federal rules prohibit you from making any further 
disclosure of this information unless further disclosure is expressly permitted 
by the written consent of the person to whom it pertains or as otherwise 
permitted by 42 CFR part 2. A general authorization for the release of medical 
or other information is NOT sufficient for this purpose. The Federal rules 
restrict any use of the information to criminally investigate or prosecute any 
alcohol or drug abuse patient.The records that you are about to access may 
contain highly sensitive health information, the redisclosure of which is 
protected by Article 27-F of the [facility] Public Health law. If you 
continue you may have access to information: Regarding HIV / AIDS; Provided by 
facilities licensed or operated by the [facility] Office of Mental Health; 
or Provided by the [facility] Office for People With Developmental 
Disabilities. If such information is present, then the following [place] 
mandated warning applies: This information has been disclosed to you from 
confidential records which are protected by state law. State law prohibits you 
from making any further disclosure of this information without the specific 
written consent of the person to whom it pertains, or as otherwise permitted by 
law. Any unauthorized further disclosure in violation of state law may result in
a fine or [facility] sentence or both. A general authorization for the release of 
medical or other information is NOT sufficient authorization for further disc
losure.                                                                         
    



Family History

          



             Family Member Name Family Member Gender Family Member Status Date o

f Status 

Description                             Data Source(s)

 

           Unknown    Unknown    Problem                          [facility]



                                                                                
       



Encounters

          



           Encounter  Providers  Location   Date       Indications Data Source(s

)

 

                                Attender: Elba Marie (Jack) MDReferrer: [facility]                 2020 

08:20:12 PM EST                                     [facility]

 

                                Attender: Elba Marie (Jack) MDReferrer: Ant [facility]                 2020 

08:20:12 PM EST                                     [facility]

 

                                Attender: Elba Marie (Jack) MDReferrer: Ant [facility]                 2020 

08:20:12 PM EST                                     [facility]

 

                                Attender: Elba Marie (Jack) MDReferrer: Ant [facility]                 2020 

08:20:12 PM EST                                     [facility]

[facility]

 

                                Attender: Elba Marie (Jack) MDReferrer: Ant [facility]                 2020 

08:20:12 PM EST                                     [facility]

[facility]

 

                                Attender: Elba Marie (Jack) MDReferrer: Ant [facility]                 2020 

08:20:12 PM EST                                     [facility]

[facility]

 

                                Attender: Elba Marie (Jack) MDReferrer: Ant [facility]                 2020 

08:20:12 PM EST                                     [facility]

[facility]

 

                Outpatient      Attender: ODILIA Garciasg Gabriella 1

 08:00:00 AM

EDT                                                 [facility] ([facility]

)

 

                                Attender: Elba Marie (Jack) MDReferrer: Ant [facility]                 2020 

08:20:09 PM EDT                                     [facility]

[facility] of [facility]

 

                                Attender: Elba Marie (Jack) MDReferrer: Ant [facility]                 2020 

08:20:09 PM EDT                                     [facility]

[facility] of [facility]

 

                                Attender: Elba Ray) Frank MDReferrer: Ant [facility]                 2020 

08:20:09 PM EDT                                     Gastro[facility]ology [facility] Hepatol

ogy [facility]

 

           Outpatient Referrer: Serene [facility]            2020 05:02:0

0 AM EDT            [facility]

 

                Outpatient      Attender: Serene [facility] Joce Quiroz 0

2020 11:15:00 AM

EDT                                                 [facility] ([facility]

)



                                                                                
                                                                                
                                              



Medications

          



          Medication Brand Name Start Date Product Form Dose      Route     Admi

nistrative 

Instructions Pharmacy Instructions Status     Indications Reaction   Description

 Data 

Source(s)

 

          10 mg-3.5 gram -12 gram/160 mL           2020 12:00:00 AM EST so

lution  320                 AS 

DIRECTED BY [facility] AS DIRECTED BY [facility]ENTER[facility]

[facility] OF [facility] SOLD: 2020                                        [facility]

 

                montelukast 10 MG Oral Tablet MONTELUKAST SODIUM 2020 12:0

0:00 AM EDT 

tablet          30                              TAKE ONE TABLET BY MOUTH AT BEDT

JACKY TAKE ONE TABLET BY MOUTH AT 

BEDTIME      SOLD: 2020                                        [facility]

s

 

          500 mg              2020 12:00:00 AM EDT tablet    20           

       TAKE ONE TABLET BY MOUTH TWICE A

DAY        TAKE ONE TABLET BY MOUTH TWICE A DAY SOLD: 2020                

                  [facility]

 

          500 mg              2020 12:00:00 AM EDT tablet    30           

       TAKE ONE TABLET BY MOUTH THREE 

TIMES A DAY TAKE ONE TABLET BY MOUTH THREE TIMES A DAY SOLD: 2020         

                         

[facility]

 

          40 mg               2020 12:00:00 AM EDT capsule,delayed release

(DR/EC) 30                  TAKE ONE 

CAPSULE BY MOUTH EVERY DAY TAKE ONE CAPSULE BY MOUTH EVERY DAY SOLD: 2020 

   

                                                            [facility]

 

                    Esomeprazole 40 MG Delayed Release Oral Capsule [Nexium] Nex

ium              2020 

12:00:00 AM EDT               ORAL                 active                      M

[facility] ([facility])

 

          75 mg               2020 12:00:00 AM EST capsule   10           

       TAKE ONE CAPSULE BY MOUTH EVERY 

DAY FOR 10 DAYS           TAKE ONE CAPSULE BY MOUTH EVERY DAY FOR 10 DAYS SOLD: 

2020

                                                                [facility]

 

           Oseltamivir 75 MG Oral Capsule [Tamiflu] Tamiflu    2019 12:00:

00 AM EST                       

                              completed                               [facility] ([facility])

 

                          montelukast 10 MG Oral Tablet Singulair 10 mg oral tab

let Singulair 10 mg oral 

tablet 08/15/2019 09:06:21 AM EDT       1 tablet                   completed    

         Singulair 

[place] ([facility])



                                                                                
                                                                    



Insurance Providers

          



             Payer name   Policy type / Coverage type Policy ID    Covered party

 ID Covered 

party's relationship to cantrell Policy Cantrell             Plan Information

 

          [facility]           [id number]            WI2              

   [id number]

 

          [facility] Care Management           [id number]            1                   

[id number]

 

          [facility] COMMUNITY PLAN           [id number]           0       

            [id number]

 

          [facility] Care Management           [id number]            1                   

[id number]

 

          [facility]       O         [id number]            S                   [id number]

 

          [facility]           [id number]            SP               

   [id number]

 

          SELF PAY ONLY                               SP                   

 

          BCBS BOWEN HMO           [id number]           SP                  [id number]

[id number]

 

          [employer] Blue Shield P         [id number]           SELF          

      [id number]

 

          BCBS                [id number]           Blue Cross/Blue 

Shield           

[id number]

 

          [employer] Blue Shield P         [id number]           SELF          

      [id number]

 

          Medicare  C         [id number]           SELF                [id number]

[id number]

 

          Blue Cross Blue Shield P         [id number]           SELF             

   [id number]

 

          [employer] (Old) Medigap Part B [id number]           Self               

 [id number]

 

          [facility] Trad/MX Medigap Part B [id number]           Self       

         [id number]

 

          [facility]/Ess PLS Commercial [id number]           Self         

       [id number]

 

          [facility] Trad/MX Commercial [id number]           Self           

     [id number]

 

          [employer] Medigap Part B [id number]           Family Dependent 

          [id number]

 

          [employer] (Old) Medigap Part B [id number]           Self               

 [id number]

 

          [employer] Trad/MX Medigap Part B [id number]           Self       

         [id number]

 

          BCBS BOWEN O           [id number]           SP                  [id number]

[id number]

 

          [employer] (Old) Medigap Part B [id number]           Self               

 [id number]

 

          [employer] [place] Trad/MX Medigap Part B [id number]           Self       

         [id number]

 

          [employer] BCBS B         [id number]           S                   [id number]

[id number]

 

          [employer] [place] WATN /307           [id number]           SP       

           [id number]

 

          [employer] [place] WATN /307           [id number]           SP       

           [id number]

 

          [facility] COMMUNITY PLAN MCDO           [id number]           SP           

       [id number]

 

          [employer] BCBS Health Maintenance Organization (HMO) [id number]      

     Self                

[id number]

 

          [employer] (Old) Medigap Part B [id number]           Self               

 [id number]

 

          [employer] [place] Trad/MX Medigap Part B [id number]           Self       

         [id number]

 

          Um[employer] Ppo Medigap Part B [id number]           Self                [id number]

[id number]

 

          [employer] [place] Trad/MX Commercial [id number]           Self           

     [id number]

 

          [facility]/Ess PLS Commercial [id number]           Self         

       [id number]

 

          [employer] Ppo Medigap Part B [id number]           Self                [id number]

[id number]

 

          [employer] [place] Trad/MX Commercial [id number]           Self           

     [id number]

 

          [facility] O         [id number]           S              

     [id number]

 

          [facility] COMMUNITY PLAN           [id number]           0                   1

[id number]

 

          [employer] Ppo Medigap Part B [id number]           Self                [id number]

[id number]

 

          [employer] [place] Trad/MX Commercial [id number]           Self           

     [id number]

 

          [facility]/Ess PLS Commercial [id number]           Self      

          [phone]

 

          [employer] [place] Trad/MX Commercial                     Self              

   

 

          [employer] Medigap Part B Ppo                 Family Dependent 

          Ppo

 

          [employer] Ppo Commercial [id number]                 Self                [id number]

 

          [employer]HC COMMUNITY PLAN MCDHMO           [id number]           SP           

       [id number]

 

          MEDICAID            [id number]            SP                  [id number]

 

          [employer] BC B         [id number]           S                   [id number]

[id number]

 

          [place] [employer]           [id number]           SP            

      [id number]

 

          [place]           [id number]           SP  

                [id number]

 

          [place]           UNAVAILABLE           SP      

            [id number]

 

          [employer] [facility] P         [id number]           S                 

  [id number]

 

          W CATES              [id number]           SP                  [id number]

 

          [employer]           [id number]           [facility]              

   [id number]



                                                                                
                                                                                
                                                                                
                                                                                
                                                                                
                                                                                
                                                                                
                                      



Results

          



                    ID                  Date                Data Source

 

                    [id number] 2020 02:30:00 PM 

EST [facility]









          Name      Value     Range     Interpretation Code Description Data Felicita

rce(s) Supporting 

Document(s)

 

          Follow Up                                         [facility]

 [facility] 

TEPUEa7bKmZEYgIaBXLeOgkHGQrjRZedMJMbW5N2RPxbDl6RMHxgqkRzVQHjLt2+ZOMmAE0ycs6iYBJd

gMy
1gZSHuUjmrV1TcEFTlb49TKRBjNMcPDnBuXpUwEoC3JZH2FnDkBZT3SdZgCbbyMT1wLWG2QPLhOKogRH

WuNVHjWAO9NZXbEi9xUHtnOGxaUf5CJO9lr3TvGXEbRVXpCxaMIVnmUUrfBQNvKEZkQNPnC393opPgCY

4OlAUlFKx8VMXpRaM0WXCnHyG6GSRgGxOaHmKuLTCy
X1Apq769tgIqrgX6FN8WV1WzBSX5KSs6A9vwSkFzPJOjAAOcIF9uZcQ9CVEkIf7YhMycBVUaZDIpRd1D

xUo8PBL5XPZiJm4+Pj4+Za4fakNuUfdDLFMxBX0fne46YA9PjDZcEW9PAKfkG71hFNnsCq48KDjsYKUm

WyAnDGo4Si7fWkJnh1SmA5CxBPu8K4bAHyufG1OuRY
uqRE1vYFX4XYZwAd3+Ew5wXXLvFJ04QGLbVWZSX0CcjbYoquQlLKa4ZWYdRy7+Fd4sshEoKwwGFIGwQT

7jgr20NK3ZDW9toGapKoy7WbGvA00xlMRbL9ohUqFeD9CqtCsdCYSrVN9pS3CgQOeqJWLbUF6brkVkeS

3RnJf8KIMxKw3OmCJ7OZRkP70uMJKkSZEIEGXnr2Qr
FX6Gh0xceeOaDQQvXI3TNIGeY3DZG3SiI5elxOjnMVWhTJ8HNCaakGUeDOb3YS1JyZGrKIIbT89ytZ0m

GX64CSt+MjL6sbBwnS1AlEwup6UQFX5WyxcjdtG5PZjpnD2AA4v8ERynRBF2YCGWg1r4u8y2zwe05V23

2K1cbNIXgNcV87eL/Un5qcxXy0OeI5+85xjh1w887e
ZHnGMaKuWlSSSkAoJxrzUra3PjMtGcgyLXaBcBfSbXZXZERksURexVRkm5lVxMxLVvi2AK5EHdNysNDM

8KP8yzZjlu+b6EMFAHUFDU8TxiGUbZX4D+YP9/TV94QXkzvI3DLjcGomRCGAt0PUl9WPUPORHVFBEsBl

jvAQMLB4+LcPNPsnt4u7YcYfXLXo0CHi6JTTJr/m+p
364pOBhNereTk0MI53eDYG96zdM/Hp+FYRGp24puLawCyKKqTJRHrj6APGHUQ5iJq9VNsfUFh9dKL/FZ

MvnnTcFMBN5QE3rfQ8lav/m9I3nemnuKD1pAUb/s0S7cxSPdDGFQbr+SklN+h2qv5FeWApEHhUfM2Ob0

LvP3hMa81X4PBg2DhV7NuM5yRc7mtG7mfkl9Pl2Jhs
/UpJql66b0W+zto264nZOrOH2Z/Lf2oC8eiuWZg5BS+jlEVAzCKkYp4JS+aRU1A9mZEXU1/yI9v9Dx2g

ti4b1POn3EGhtF39aAgQrRXPgZK3Z+Q9n/nrCg/1fK/qew/0U6AhFBr/efmJO9QEIUziT9MxZha/zLv/

zLv/zLv/z/C+DH00F+vNgy1/TTzkY3Q/oARgtkGSFk
srEcfMYESRYErXaB0jp0lG2QevpYNlDVds04PNN7ouGXDjQgFCbVEJBh0+k4qxkOTdj97263zKRL+Bx9

1sTisT48w5+nvH38xIjOQiSTHM8//Q3CSVgBbzCv4+ukNV2s25duuB88J2LsN83TgsXAZtosm46JWHqX

g+7Pd52KPqK/bxO0PG6xbLNhzHJkM70h99eyDIcm3/
hK4Qm5jqlkko1mOn/XzsygrDzaG1hmQLenMcesHdaBtmQaTqb0s9mE0F6rWP1cd1/1yzRaMyFXUS+n9V

AzqCfUMnovQGCO3ux4/FqFz4EN+6Rv9zqBcD+M8t2JjK9sH5+EAV3D0z47XVoOhrbuAE5/qmoPPMOUqG

5l7OPee516x6afRjqlZk6RFvedRrs6XHa5uQkudZkH
sx8ti0fbr7ZPrtrpoLHmNWLj0Dm7rVA9NS951kwLAeeP1cSqwqpsl9hBb6dAdL3CEZULhGnmPvWij3pT

gzr3E0w7MByukL4w1jFmTdzKprp6aaQTZm+049h2P3BNsWqAEKCd+0NHmAoVNP5cb+e3Q1fCsAzu9jZG

purTImy6cIk0KMeJ/cOG6j5GeBX9AquLqcrrRAk2ef
VACmGwasIASb5e99ZrvKhRgmYXF5WDIFhFzsjmZ4rxw0bUJinzM/25+n3jd/N0mz1yEMM7WI9pq1OBEO

mZK5AE2bd+sjTyk+6Isejt9tY4qkWeE/GAEJccFuOy2/cT06FsvldE/KRXdlX8UOpH+H3qStuRQLKE23

bAm64pawnX4WfpIxqyPs6iAme09zD+udfgGcyjRq+V
iaKwEVltsiMAUYZ0ltTpfLaEgqiiXwlj5Zr3JXjlr4YfWhki8IRWFjlafPk0OP+vKUfaPkcrKPm4PuLq

uqcuykAe3G4WQExexkzyxu0kNhbF7ma4SnWYRnEYhZhRd0LasCuUQUztI0HQzey/14hguD/szCa9AjG4

xBRripytZWm79/sd+SSHlvSkBbLrGAv66UrD7Tm3tI
oW5yoGfh/hGc6fl4hctA8ihBUE0MUBUsWeVGB6Xdz3gfGeuEbQkZxzwhFf8QA8QX6Q8qJqKMQr0M99Mw

UAil0CmiZJ2IDwnpOHgmqcB9VEhl/GzLrUhibsrTb2MV2vAgskomfjoZNeCdKiWytj7LhPx2yozrjzJ+

2XONMeZ3ln7C++v2CX9U35+P7Kp0cwFzFPk5VixbnN
Gc2fMuLkHXI6/CqLffvVWPqQ/u+Ro3XsfIF4r+/L8Xa8FU52u8epHpPerbi/vqZYOIPe0QWc/sBmMHyo

p5I6zzDfyAeau72Nm5jA841nu7csPp4WXh0ASva3tPdb0bMaXycuDSHQfeoogu0VZUBPoLJao8SRkO8i

aZTelqrBtYC0vv8cRyLzSFuvTGHuIGz6OxzJKvLTLg
a3epfP9r+3uUV7ekTAF0oqRygpQU+ezYaB6KO4JyqrrWze/Cd3/3JJAG5mKp9y/0W+W/GJdwaBFnc+e8

RMTIX2gxitZOnFHHvymhiqUeGWjnuIztIZVSxuBphwoZWJ7n/fA5Tkr7Y2NO4a4CF16PxzrBsNc/TZTE

8eO3pKizUQEQ8vLGDMhYqE2cmPeY/+qSDJ2u/ahL37
vQekuxTZAWNxKEOBLUvJLY9xaBUKJe1f1vpLqDDw5b7BmSH4GCusJiUFog0yjXN4zvc3HJaot5trpDcC

vP0Eor8Rw2+8dwghdpgYbg3cQBohkY6tT2nuSorLxZipFvDAbFVc2+y7LwjcTcJ4WlYwN0Nt5oiOO+M7

hQg7lRBfNaUWzofr9rWUn5zVeeegVG/w/CrBf1sJnh
jZXeP3t6bCfceZUvOTWi+5g1eG/1DoKxGMryP40o3Rai9ocE2qU1LRW55Pd5aX/b/6G1Ty75603uCAXD

1xkkkO9RIMOWPgofZ7QZkliMn9jY0/+6RYEOohZi0iK7ZQORjAeEjW7fibkGggyESIqlta5Pq3ArOIK3

HIJYOcvJBDQOivofSPLl1BclAWYDs1MAzGsqdWKBmn
lsmB62zewGdI2J6rghZhR6LQvv07bRh5qCvprzto6Rz2R4QTLaE1znF1NlZAdN4egqFyvT+ByVXXkcLV

6AuUXaehsv4dbCu3GCVWXGIVsZUQB+0ZffbsPMsn+51FUZdV4851eLLHQLXC/xaCm8/A/PIgfKPb4p74

EGzcNGe+ZYpNZu8uCdVQ5T0MOAGoPlb6mEXr9zgDA5
Kvr0QuhkkuN0zBrVFTbki/mgiWAsjOZYLYWnZ/aI7d0sWu2UF9xL5z0ksq1nCbl0rXCZDL30wd+EzmNE

5cG19E/aZC0RhZjwck4Re/slJjzKhhVFd3fP/MWYZB7nHpgXPC5eoD872Y1vv5Se0scI04LPbTfZhQYw

NUlCI/39p/A/SJHp8cgB1CQYXk9ck8v34dPciu0u8n
Zeq3ZhpuaYqnvSnxM605hl55zKbrWnZQ7YrO+31r41Jlt915hfSt5DxVPZeQ+FkmgVZE1HrQGAs923ns

[place]5sxvSrKJXcHTZIVHwBD4lEZZhgiB3eFHGbo93IKtqJrHErMjKo5M6LDexGlV7MERnpeEhk+tz/S4Dl

8qLMl8k6L7pu0dTR7jN3P3tAqFqkRt+CHHd8wXCiBz
WjMkcIhyBfxg+D14nzDguUUzpQZckufyyMN5eVW/Fc52aXSZMsKlnHfUAVQwBECjoBlRfSuux3hUP+hw

GDZ43riytmHy4uss8ADDTC5OSLcVgrD9BSiIzLCF3YXrVRrOMV+lLZuuxReVcqi10EXSc3VCPzUCp2Ti

gWIlS0noeyOZSDmRRf0AglQ0dEqsUkZPa5q/k2hThe
GZo3PGydO5zjhdoKFhCKvYXKG8NQVcxBQI330hC87wzwMzNh8ncbSRhbOvMPRIwmbBOvRHpMpA4OMoiz

VycQKeVNZ6MDRDG6mMS9cVJmzs1cCtOuljp2gyrFPjgAMcl1V4ZtGFzP6mEbRkKF9FYR6sTKFyzsN1b+

eTOXKxMMQ6xX8YgU0aURYJ54jJw9b7uWQGy2VvREd3
MfnxD2d12vWlvnmsviEtqEjabxer6iifkSDIibyMZLdNsdJuMm6x5p0H32reoVXhQsX16ZB8TutS5cDR

9C99IAKapUljoY56xEWryTzwEp7XTOIN5ovlCi9iPhTQkuA2H8kZbrLZmmblXpzczvQDm+JVwkcCRtBP

/W9jwl2Cl5DVFWdeQBNVNKxdyMx4rIj7m31zmN8iwn
+eJvJUapi1ijKXUBb5CkYMfkGQPyrJRQTOPy2r4Z0wY298d5iAx0SS448mAyCYr5tK6yjv9rNR2YgHFY

ScTGdxCh1ShzB7XGygDItaqhw7hNlSOiKEaxRnTLs7sCkqOmcTH2W91/ADiRn8xwMkzu8r/85f881k0O

vaVml6OwIjo1Aqv8eHWjm+XCHysgavediDl8cLTBb4
hfDWfUw6vG+/MZ1niP7TTrFwZxF8hfGASQX8ZLW3AVbFUrvzvqHsRb+7xurJJoNklP3CBH62UXiY1ORt

I11y3SMM0y3wabMtdq/IVgUhvcOFwC5R0S+t1Pkf6uhgzkYXkZRDACYsQ3MayJFc9hWnv9OOxR4t5JjA

9BMSCTZ/dbig/x9HYAPF23sM0goBrZKrhpsTh9TRV3
dJec6hg2kbdHQsgWJqwtr21IWGtSn9X8uxQrTco5nYcGd1vDoAdL04udKCAwECdbhCu5vpsUGTSddnry

YDi2hTLLbBfdopVjO4h2f841GZj13Q/shurVZgG4ETf3zWrQ5/uhDmBLMzURTABrsZe6fAi/ju0QyNgs

ORenlTj1MMQBpQ2SE7dXfh7kWj49OrR436Qv8Fiq4S
LR0H8BzeuEUhqDZQ/6N/HxoOu+lGZMDiNySUECvBHlc0PX5j5tjfpQlpYpL3z0jnXCucteQ1mFspM9WD

37FJ2/hqkAiZMcOyE/nWLR5VVwlkJiU++w6okgaL5THzhAnaUgxab2IaHB0KREuie70Idv4lysF7Cd+7

hA6UpRHnHylSekoHkyD6K+UsV+Pp2ujT6aRXeOQ+96
8Y5ZnSM1SsEorGz+gt0Z4d1hkp3r2oBLbXPI9y7xR8aF7NUBqEk1NNO8f+ZZwZrJDq2Gqo6Z/ghybkiT

K+GzRxHjPTnA/yuZXewIPosT/XM8ldI5s3u5VkHAnf7afBLqgkERlT/OU7q/gjmgfmEkUb9QjN8lPphD

mThRQNrwI+gVjuzzhdn3qSvd9fSAZ6XuechIP20KMm
bUSExr8OoMVB0VbQZcs6U1aWXsRnPvShB5d3YUogtqO7b4YLwFQXPkHMkT3yjv0WjHTRpNoNUkfBHwXh

Y51xEvrlpZpUrfeAvZvu13CLTz6zu/wmY6cwzQazgyb3V7HpkysZCiYRFGHDF+BuixuIrorOxd4PnLIf

5tLc1M82bUhmqQgpwgBgZ+pkq+hYLTn0RwsgASPAax
JCyl71MZGF4D+phV/UxKIAUvnrCqyctzMj/mcfM5+uF1zxenMZpRAn+O10YdHJIz9L6ojR6G+Of0tp37

V93yPF7QWnQ+wPBngZ95nPl5V0wbgSgji+zaHqCVx6cgBBjQJxy5S3K96CWJ2cPdQCkeUpfg7ZwaFJmI

jVHwBFv+oooJ2q5sRSIxUsiFc2wWQgKOBqI0EtXOcZ
BE3ZKyXdLSMiCmjrn+mQqpP1UYNkLllJxSxMG2+j3vEgn3n/sQoxQmRQs2NW6P2TORwAr6wCxqwxUj72

JkTE74c4bE+sNmxPQGi1Hq1AvPPegCRF2j/RSoZWPSAlMp78iQVGl4adZHq3dStdVn/Lc6sPj0ziLekm

3YAKp2FygkRiPO8BQGEm/1tRvhbp8AXx3ZkozaPVZO
YbCv37FZsOst1fYnGiDlnaewNIVYWpOnsFgjO0A9KO07bgOwexnznSu1l/QjyNhtRD+AvgECoAXXoX9k

80tJ8Dg+tgk1QXv0Kc5qy8fm3v9HgOazyh6GjBA0CepfBqFtmSai+MMnsdWQlyfT8EwURr5W+6OHZlWP

PNtNYAX32vBz6gPVbQv5vPeFOkJKDdBiYdkbfLcRTu
Qq1SyHcnPFr+6Fnc74mt5sxdeIfhtY7lQwBFIaF6g6ulmhbF7UZ4oPl7LHP7wZe3c2e1X6He6D8Idj4T

BpikG3e9eirUk+MCIzoR7N0qro+ZxVhGkg223HaGSJCj2lC7HgmvQPtZIj7fU/7ABJQboPuY3vQzeDo7

2d3aY8U3sd5QXH3SodNacM36Vb0mPVo1CfovbJ77F3
VQN3fz+MVThqsagy1V5t2cJMo5oH22t1KCqMa0qoYh0HiDie9m1jPKLS6P8gJ43IKm1g3KYgRYURLXkr

ZBRK+iyVpmXz9ccLikE3R/7YFNk7yGSMzSw+vYVpjUykZoBtXjJjk2KcYRWeO5G/LAKsdQKtNMO7ENwg

Qlb+9iv2ivSeUpgeGHaEJEPxaich3Nv9Eahz3SOJm+
O5fyGkyouA6X4bsZbVXCOOQTZR0L5xpOCobmtp2ubN1smLHAbJKd1iM0AiSeuMeFYpw+YkIrRfV5isGx

qisiJBcSQMwRM9toHeMJqKhIXIlHW+lwf7NOu3ppKMXSISFs5sUD4cgNDEZp2TLSAgI8cRqGLL7a1fkl

YUJzWppyZ5AQdNOGy9mZ+Ovpt1iQF4DVfOkcdIJdP1
o95Wz6xp1domIkCQzhe78cL3fuxyZ+B30Fkd9eK9krOODvSSlGruoukZRWJ80UTOY8kYnf5gk6EgRlqu

/Y5H8z8qORar0i9q7VoAqtxZefOKe4WRtJDfLlfWpUiIt16+ASCBFLD3VxXKaP1GBkXqTkM51/FuRQYD

sHYjZMRz8F2DPdk/XuUdjLhBda7P03vKWlKlFPIVAH
GkdJPjWUnW0PsY0UrlgVrziefdkppFiSyCLAuH249ZM+gT3XYJR38m4FFmBY7QP8H7ue2BT+VjNs6Svi

2PrOpkk0EySOdktmSHUL5jugEZoEfgzKBqFj3I2J7y8Ju+a0Zp143089Ro0zhWWUVAOQG6dw2Hn+KhOR

nz+BcUMfuIDgVy9edfKPElv+VztjtLM6/Nek8c2ul1
DCd4FjyFITY3ETUeND7G/33AGjnNXAUkm/+HBpUpL7aR41kJV63NTv/xyl9yTMlv8a/ZjdkyejrAcOAZ

M6b8aJxmjVqO5BJUPhfalekN3Lo7Fvp4VCQdGn+pIH6s5g7vuHHEsx2En76zqaVYv1BZ9UUrlRbEhUAF

2Jd1rmN3Z+mO9ELhiO1hn6ByvCXvXv8v9Adgmtooa/
gvMyUgCh+jz1qUhnMpReaGonZnvZMcnpI9PdyUH2z6UFbuI5vxCQP1LGkGXkhMVOO/uxg2jXOfCao7u7

3IoeBF4Fls/I+V+QgSedQ3L6oxS5/D8LfqjYoM42idgoxsZ0P/AMIZXWJzhfi4C/xX8N1Fe/uPBoXfL3

C6/UAp1fDpVJfD+s4rmf6z37hMCPOF9PLhgyYllSsp
74JpqQkrlXpamTRQRcnXP+kMz/D+fluxNuv+ZwDzE35p5wmSPD8OJ0qg7NEsjk8aM21XInoSqoPGLETa

4zsvbuslySfzLJBXuhwi0ef2w2CPN1kG/oVO+4mmDGRj9wkDlpt7oB1e6msLtbKy0XBw6ys1falHgVVs

m3larFULmIvmbvdZyFI8235/hgGG426l4Zm+aYkWUl
S98+KuXXVhWzezJUmRckg/qP0ubyk3y0uEexBqHVv4CSwHMYn3fXC+xLMdb4V0p3kqTfa2RI+SgAcbjJ

R/Qt3jJOPHbvzjMbDnkvZLE83FmGSJ+7rp1O8dRiDkZvdPqwJpDRjlWZGXafnhXVVdwGLxBMwsdPzy/T

vcfaUSipSBSQnIJVi8OZTo5VxhwMGkdG1O8Ol4I8uN
cyZlWCMGEtCBdLGAuODy8OAqraW+mJSDEB/WgdrV1ztaVBKoNJWuXVkLO35POodSYmik7N864njV3PLr

j0f1Qxtktr7OYs9aL905epcY6qnbeqTrrG5fw/ZGHMMkLTUrfvZOMvbbvRcd5VExv2qXaqG8O3DEa6kS

3myaJxdynBjEOryWVAQFBKf2FGPp/z3+F5lWhDOPhi
DIEaQsAFvQnCCog/thXVOvVW+vNXLJYEt4F/baee3G4VT9yKJJdV+8kQZkG+tdbPV6969RxbOLuXhA5D

owlkU0tZ9MRakRXn3La1opDFd34A6XF9rzd8IezeudUsCFOaubxStFdq7jt3grU/7srfuBljchRnf9ZC

V6DdzeCbGFm1JkdGwezig10h/V1mClqYGtK7VC3lY7
/zRW9DW0+n26W5JjGbZpW6KtKICCrPUH2dYD+qeYtZsCgU5etoJLJqprFDEKCoIF/Y9EI+x0bN6dn93G

lVD9M/KZORpVtDUJ3ZksxO++freolMHAOaLDVISTVWQNIctC2jkpRIxl+R9tVDo8RTLKPV+YE0UVCKah

53YjcBZ517WBpkqbAbXI1p/WahW3Ns2SVpd5GiKVki
lrBTX4s221nDjlGT/6Vsk1iWS9TEK1ZpIfk8FhTFszjpA5dIXT4yK98BDY33nnUAfQdepKsj6/AlHoAB

5cchW/OjBGjJfrGsMWbYFO4AFZrdlZEvGig6wSOVrnM63kUP2UzEJDt+8MXBk1ss66/tGEyGpKWiW9sb

RZ3pulttjxQWv6IFkROlumQErGLpe91CWf9tMu6O1t
k2MOiR/nqVwTVMP1pB4j8WRdBC8daz6mJct2U+NN+kni5fM5pF0B0Wvv12tbQoNjIeKbl7n15N2iS65v

Jcxc+3hcRonu0DzlsmJdyQjF/0ynKwOImPHELS4BTeOa3YEGGaqolbfxZU0od7qIX2VgQgLLtksQky03

A1HkU4wXqKCG1Uv6DZCX4qmze+WCThQuqnnDxx6ehb
55VPXMV+wNXIBbf2U3N9dMuyOxpGinYlGhSON0cQ11nmmwdiVMSFFgwgksXNWcJ68Ez9q5p4NQrA36jz

CiufwmR1/0lXCxEYnhAvqMMP+DAb24s2F6e1bpGQMUAORcUiYf/dMhv/Z0Rmc6fDvWPIa5/8AH0QZzAW

hXOaKp7sRlLDADVd8XOHidxd5IEdeuTmUE2szmZCF+
LpKPpM0Lj8A66Bm4xdF7bPPZtcGjtC46YF9Su3RXR+9WRgLj0u2egfiWQ4Aho62VShdxvusUKlGBlXmi

AtGxKrxasFVbq3RHjeU8+ZDu4372dGzjzRDHkgPJOVHtQ+AyKpgYMO6Fdntwx85X3NpVhYZpf647wMvZ

ZYEhnBYW01AbKAsTdOTeARzHpeIioDYIeVOZMYyC/4
Jsk0zzRXCaVxx1/SA/KcROJ1rVGlftuBtBoCjn02vXg2mnZZD+ubJdC4w9jN0XvMMofU1eZRIYnXf3ml

bKW+FHMj/BwUcun3SMxN+OfTCE5hVdhbfKxYnAZU7AurEUAy1mrUYaHhl5WOPZSBoGKlBAxaEmWZQ28x

rbnDeC44XX+ggiXTD890uOmI+qKaaQz+BzRR3R+wqc
hPwJ4d+8vUhgiFi3MsXNvZGabjKtXf/IkrfHVt65ZAkg3cprqeTe2brtg2jPF87GMxdegzFhGwncORJr

mV8h5QqoonKQOd0NVJgv1rdXqC3SFt0Et4d3A4n2S35/vx3S9QZ992dg/1kw0Csmj06fCIqa9iQW3MSN

QpfsY4bPOHTaO7CF7+uR4iFWxwS03pfo3wipfjXblF
4zGODcK/ZriEBqYrpzUrZXA1CiOLQv1OnBv7lyVsHHuijh/SUZKT0hJoAgFa1ETI7v7t4C0KFV1c8N53

xaFRoHYQQfDmhD/eRVNkrSwEu0m4Y7W6sYil2j7A4Tqq3dk2HjJcD5a8bxffofnkafRvsxQ/W3LAOcC6

7CM0f3SRaOF7t1nZ82DfMvhsKzzEdcqDS9rFzhBRFL
x3fsm+oG6k2JlzsYn7pcTOLQMirgcvgUIjdMfQ+1Xrgd+QbQ5684j6/IjIRX1nduDz34kV2gKFqDpW8T

O2adtYYTs4vutbmCbZ/Ijz+6OBpNBKrqCPWaVTyQ4NI9T1UkYx4HCpFvGYzzWg4/c0/Mp5D8iXCwRHqP

ZB1+77Jndd5DdTJU8LKGoLj2NqnCF4RfrVXlObQLdI
VDGcuW2E8iZxQ0ooyhWV/lkwGxJpToU7enGQid3uG23yElrWUFRW+0J4ui/Mbt3yRV7QqE7IjdlVZ6Do

xpkZzblBA5VJZ6nWlN4jvsQNNAoNBggKp/6NlB/h524h7kl+VzX0Nch2hexA71aK7hrY3Wt5ZQ6iIX90

pwMYkzrGCYS8DyyCQJ/MtQksM57v6i92bM7fD3eOPd
x7l669xRs3Ga0uRhDdRgsMRtUUzQ9xwogmcR7NA6fW7wQ8XH/w0Q/iAKQJIEerpX0s6ZLf0CmG+JMz29

bGT4IDtTG7zjWBBYKKwa6htnFAcvD31GWJu+NR1vcZssV1MYRHOp32OT14ZUS7vBXYTC0Ol5UxIcjwNQ

brm8I6+xlKyJqK6rWJYThnBypxwzxU48aEjcv9Y0d4
TwxQq13iFPW4Y8eR/S/TH2H9g/RoGyu0myt0Z289LXQaTPd6xL+/zE2lwFVO4CP1YWMo8e7Xk6xzYa8p

QpLK+hXbO+JqBSxFwnx/X0mOUK9ZrlcIrzypQb4EUtvqf+1No0hV2t+G83dPggoLakRSDt6bGRxON2U0

6ebpOqjlWk6S/Z/xSuH2VyPfg011zLr9zOLmFOFLuj
As71hRNgftnoX8VjP5QLcESS/VS+xZPP95VLcdsagwHJ5bdQ/ZWrEWLW9+5Ykv40ytBtoAeRgbDgnVEE

OhL7vSo7Z8U0gEW/OOg2HcqNiXalnBGB8zl8Cmt1lKAm8CETg9TQMp8tIHSWVbM6cRQ+X1rLNFxUbtIk

7yRXj4B8I5ItshRf34+/CKRqPbHG9NQo2SVFjAbi41
D8Q2tpTgNq5SHCzgDWNpjgd3JYQc1UQvwlCjBVqNGKpQ87En4sPCc1itGZFG3EcCleEL60lydSl/3M7f

IXn3IKVvxnjJMRfueOrfb149CPT+uwSqXtM2Kklj3mJmFJu7R4mnelJrpvZ93fdtxdfiF8KTf7OX06pQ

E7hsltVWXQUPKRDeGoqlc+Dm3T3GHIOh6Qf31yZsMw
kfNxH6dRCcR0Ga4LDk2uBRQ+VaIaH017Dpdy6t3Vn4jKlhWDMPvH+BiCl74ZECRI0Cm1tuofgZtZw2CH

/YAMiy5Nz2g8JttpsV9ZOmpLy2EtxgVdmjUmXZGkF5cPmQ23jOsBcrjlPgNc+25dV1PZf/cdrOBKPJVY

4wwHwnK7RWmxoa94nHfHXaB4AZjRbwXHIPBtrrybmJ
ZtvOTPvrJuhbN1/OddzMp1RAsaScR+HlSpY4urT1o1DjkrxiuGPT5oS48FKrAyFjyFRVynUGf1VQ/jE+

06GERQfmSA0eoo8eZMfppl7ijoX0AKZ2S/YIeu0juVKs0QIGZqdOPL+xczybBUZSebIsJifTYMxKRPCA

scc6U0mMELZrbZBDMWaF7k8/d7GC36KnUP/H6tHSUv
avpOoAWEtJv6REjmAP7py9RNagjgzrmGkr0ThpEJET8mu4YQlt5mIMKYxN5ZySBVU+mFdJ8ndDdaoaO3

uLZg9pM5+dK4oKj4IG130TST1rUDFkOZltLSMrMlVqMj9ehIkZQXlpQ9hx8wZAD915Udbs/etc9rUFnL

RHVs/AWBEDRCNDlbPNGVF3Zn+PJt+IxtUeQtBm5hWD
XbU5Nsf7n2Rpxj+3LteETdA+xzfiNmjpOf9sDZR1R0xtgLCkk2zu7MQ/sZPVbEJgitSoO0FV8TfbuQkZ

lPalIwOa0heXpE3+ETf9tU8KE1tZt+cIeaDBHfRBaikYijjOCekXBWi7mdJik+sjNIjhIoBUkY38Axrf

LdI0CHjNL2WJb6RKyd5ALd1XKdTK4itp0eEKq36mS6
jiKS1JZRr6+bULSDKEnpjrtQCOXXF44huTOfm/eRgSfDFr4CWm6KYcC9TQoFgY+Ggeviw+2v7SpbqbYX

d+A6D7DiV90usEZN6n/O/Dk6I2Xzjts7r9ZqpPm3SOGyFmSDJpb98xyuaS6mFUd0ffVRLuCYWrI+BiAT

DXAfu143zD5foG0le801DC76417HeDni4D1wuIZji5
hbbUzjsziOh3YX+1CVPBjpOXCVVclf6FDdLLtwfn0wvk/QUKUPgBh47Eux6qRoKm7W5QYaiKP9TLXdeE

LE61C/lV1hHULjEltveDn2wVwW7G2T4d5UzNXh29BQYYenFj3sALkt0aSMCuV3QpXrGZZuphVdudY6Sf

ioW6tGCzbOAOHTIk3OoNONva66NCjLFUMWAiE1jHWg
fMXHoVVDhg7iyrqu5tPcSJtNE1oPpUr/E/52OfQJqncpgoI3DtqS6g7VF0LFVcC5siJI1vb3oZtycGp3

h0is1IIGP1I8OYDx6IqMR6iwNGvXWcgm6fXAJkaBK+5Mzit0cJ4wKk8pDb4iOF9KW5fxsG4/w5eLQElq

Aty2VhIOyOtQ3Hk6331hEyy4PEE098OgRRALm2V567
XbItw8dJ93XHtk6E8HmYnrXJtevK1t212cxYKv1gDs9wQ0G+13I8c1/1OG2/D8wRI0QGat/e4RxK2a8K

WpNxZTLJnwRdag0syoE6ZsnYr9Yj61lIE4igbBGc5yOt/0U+6KuQT/4UWCVJUPaFpVPJtdDbYBFFNsuG

9Q6PHZFZY25LnV5YVSsF1dZlXTwtJ3DDwOueue6xr7
YXNFwvGEXtPNyvT+AJz8RjvlqXZp8Jl4wYhF1s76Cyxj5jCdUjBGADU0ECtZgwLounnarHXs/sv/BUcR

wqQiCuE76wd8EGa4zLA3P8Wiuyq06OPDzSPhXXkfppCoNLm9ppIZ5zkRJ/zSZBGiIi/uu837HY3tqLpr

J3Ozihj+eVk1/Ec4wc3f5Wp4EbnPt9MomRz37dPRgf
n2O0WYagnXaRYb9vGvl8t4THmziboLHk6H9R7dOfobhi8lht4c0JDqevRiaX42B2hb7+VGvsMpdK7jfG

ik5fBtSooblrxdi2skT3EWahuWwc/r7RMxJUafwR6wMdYNdmum/bTvk7zOHqLglDMk/qQIhManfGrMlp

gqitvV+SJ1zKw0J8muGlWghQ3g790DjfE1OPluCWA3
qKtHknMa9SpPFZWJx2Omm/nuQG52OhLLY0z+W9ZQhBZsVpbOxuAFwmEe3pkX6ehMu+1FRfhSNSuRIlDw

pQpmzajmIKa9MRwuB0w+2gsu0We4XFbxSmfxKe9E8YQaS06OZA2HgDE+SzYhKnH4DuLNLlSm1huGEt8/

PNvG8GELeuWlntfpvnCneE0GYHlDV4kRneH360VGra
0T0qwLq/8xXSuSZCMHtlPwWbpo+nEILAzfCCtmRemNnDNrM43h+UIYDXSVbK/Zbuqeyn7dsGux3guQJx

6nzW1jaX7oKFvzjmV0I763o01fdSsL7+nbID3nF/FdBEdst82IYQ2ARddzuWJXHGw/GRnYSKFcZs2cnD

VHL38Gk+U6w5TjMPohn7bRYaDZJaJDc6fzgtV5HIUV
madjd0DisQ5sfK/RharZIq2eKCgOH2exxEX/hv+CWUynQ+7H2ew42OHZibcqmHdOa5ZEG3xIc831IqvZ

B+8GnC/1qziPx4hQ9cKbPcK/EVqqU8tIgxoE8EqKMB7nq6H9sw7maXgzQT+0bCdNDUp7Slo8WjU3XD1z

R9JXox//44X16rLvDU34x/EDvzkh7V667UCvbWlKJo
rqlBgCKKgnW95KUfyI/mDI4rvwwtqa4NSMzW3MI4HtTKSK12S4GmhjapPDgbuWxs0XfkifdYxU/JyWIZ

AjFDZqrPU78d8GYpxeVXJWRNSx7mNmcu5eZhEEm2huW8Itl0mRQx0fOzBR+cKXlTQ7xZLkutuboSAVUg

dX1+L7+RraS84zQnxqH5zSnMoAcFw7etTzQ90/AuA/
/aAo/ZDgmLQ4Xsei1VDtFInKzDps/M58d/dsVRZ/hC0xehtYywjKLPLsntpXB2boQ/wm//uLefzXs5Rb

vl9rqgMwMZiHpTR9g24z3ykvrrJy3+oMgHqpdYKemcnvhZfzifmrbhtGrTeVsRZqtXvoOf6SFgWH1tsf

VYIKcqpTbCAjewKl6UHCe+SxTgAYYrrBjJwzNm8eSl
UbYXxvilEDfavaA3ObN4RB5LRJ39mzeFzmJmXk2g6G1O3ez3p6IVX4xsRhefUhmz742E2x3dMqO5pJK/

djCSdjUGIRPmoQKOzwjjZQuaGCnSYyRmkTkQsh0Tmk9FMnmzBcF+x1TDcg352qM2zVi0XqmUda9SASxj

oJDvzt7Q/jBQ0m5lnqC2eT4AkHpy3wVyleYwW32+hR
TcAmtE4FWXkhsfEYxHDxyJ1GelNOCoWU8E9cop9pWeIb2R5fArGTmsSsPo8sKDibRmC/Oc1C7ToLb3Pb

OUKlBvPnVXpbFi/wcAXJEfeWLzp67lPmQL/tUJQULl3P620kwISM9EsYto8/icEG2QD1ECayTVVLELPC

mbFYlb2+KU9u+Af0SHv+oovPyqrMgJrYPXkWOPIGcO
HL7m2Jo85GLsHcXFm1hzNjjM0LnT0TrC3UUGr448dvrvbeILMsnTI2yEsn42kIkfY9vfrq3GH4cGUYT7

Of1Rxlr1JM+3KenLRBPjt7fmCQLvAqjytq9asJRNxes25ued0STitvNgygYAcZP7GU0BHyknNZR0sH66

/JjCVlQi20NM+4pPlAzn90evzuw2Is2Z/uV7iltao8
QJ82XYOP+akv+tB6QzuA3oQS5mGEBwj8Z6gI9lf9emoerY3xHS+pxa5mHjjyG9Ymn9yrOKkY5yhnVL50

EmlL2KpKN8kCoIXkmvhtuz0Ab6xzgBz1WT/p9IeitpdAPcsmyhluZndGIuejBSIwFTl681krjChuNxZ7

Wr5jFKjfV0cN4dntHR1nl0ZLrLI1OyITy7tit+z+Na
7obgfQH6WNMcUtLTm2QsG8d5AE5zPSz9MyDVLg4Vdka/aLsOR8sIXNKK/A3/xFWnFpE7CnMqRDkjnDz/

aOvklTd8v94ggwPKuwkFWHlCSL4IXpbFo3hBAw6txkH5rEcs35+oYRyWoDGd+G6JCBAwinjXMPaolKYl

QEtqYU7h0NK1EBh4RMCV7QOfTHa9iLh21j1CzY+g76
H8cQtePVlCaQ1ZFPwL3vRGFdB18eAGa4URbCpeZ04f98j6340j8enMoeicFopkOJuoREmabVjJiBNiaV

w71eBIgMZ9vrXqKfT3VQy4eSI0tZ5R6VtCOT5WU5esbrsd09dceZtkm/Ne+s9oGZyNzbw3b2FVDNv0y8

gCJY7xgmcEGML/GQOxhxFPZqVDFLzDJ2y0KpfU0LuO
bHIiE1/YZ8RWHwusOXQKobz+O3onQgUM10iQLDQ9xDG9N9UVdDckjCIp63BUpmgXyosK36K7l8QWJfEd

8KFv2EuKSybyU0O8sN1E54vKVw2loHYI30BWpjFpzp29XSp9VdFWoGDUBnM6T1mXuMQwLOf4oXwTahaj

Bu6lDuZhW/6rcUeWgq/Nt9mkR+3/2dbp78/c1Xi3nU
FgGkQfFSgrUfD8YDwP43v+BsU7N3WhE4ciGzPc+LEIJhDHxm+478HpRSbyl44/IBBka54IIkOKnoNf39

N2Vrw2TolPnnNEovl8HQk5EBGFG80Njsmt+vtVu74OY2etb1+67YoFWFj8jd548ru+iDv+YbYFXBLQvQ

nKWuYD8WBRpLmcLUdmf2IZFH4kvN/IlbqvWVNKcYiC
NZPcF+xJLMJY7e+SJ8Vk/+s/NT7XojknAunthLoXRRnqNYMpEX5euGRmeqTkTn9RV+g7nbz9Iq6HlqA6

h8KE3EGar+RbodG+ma6/zuxmFQ/n9P2YmyeggmSJdKE6CALt+fMdsiDcFwiUcLWl7XFnT50rL45rgLBV

zFjE2Ahq+rbW+xMdvQaRmm6PyJPMHIDjDwD/qRzdyN
q9PhcIdsZPlY3ddDeD5WxU51faX/ijM/N/3f2NUifl6IvzYqDaBekEUtpeP1GXHltIA6Oon6erh+y+Fr

vNJ8X/o64jGDTo22/ZF8cr+O72g+FEUzbHLKf69AHSWk+f8XMyKsQuVkCjLZZLWF+mC4kdiPCwr7PuPI

Q9lZhpbrjvxNMCGD/vIjbjqt/G0Pfwl1FwqHUAhKGW
JWbo//JMj3t6u9ledk8t067Ps3O3SvYJmg5NYP0TkSroM7vrZt2Vrd3Gr3Hn/B01a4Dns30Uo+HW1cuM

PThsMpIWU9X315RUiU7Yhidx9O/MF9EwEU8gLiMEs3tpVFp+YPOZIgdiDypIwgkJtRPgRaFXGyDQo104

uMHeIzvDAOFQxaXyBFB6w33c2WSCS1AWwfnugIRTmj
jQcXqF3Ui+GhFzkXDRtbu+xTK/NoaXZIOu9AY9p7yTh53s6QRS4S4tuCSSo5nSeqIT8JuRDo5qld4oGe

4C9Bk5rcUSAeLDWgxx6kG76nEnF0SeRnMvUmOT4mrsYCsRh7vzqR6VWHrct8z0Yy5xM0e3qKYlZf19/5

CrMoZDYeckXAjiOCcxd5++w4cUrAP3GyciD7Vk6yOK
KzQwLkU/3eoHPxPDkbrz++Rb9Rit3IWqKcBCsAj4mYmobPTsLuOPclgph532pYvXbcanHlFgR/qb6v5E

Ca+eyGkJiBi2tCeuZnKdj8H4DWdyba8+GWLipwOw1bq3EV5MB3hDP9RMu+DTj9vmUGNFA3z5S7KfnVfz

LPfJWl/GD9WxzWrj5ppq5rV/MSEJesm0IsdDMxbsAN
4DTwSs3qbtXb4kIbIQRCzuIIFxxaFHDtYDBhxtDWAsJ9Jd6SN9ca3ncNAuST+5XfCdRBXnAcinqAIKfo

kQhPV2WS69F/4uRnN9UEWPpwivwknX6W4U5GNiPVvOQDKRafwLtKmF97E/5a+ueU0rf95ceb451asA7Z

hCf+6EHXLVftANpUUu+6EkMNfD8oz6wz4yuOMWsL4n
PjziUuf2VpmuJzGlLbivrtMz0bZE0IeldaKP4DCtQywkpFc4BzZ73Uza8iQQaIpg675pOnawGmx2PK5c

kF4XVNKUAl6OGo8XKelnFmfaXd6ttaVsm8XGq27g1r8+pfXCYUWymlbgxKD9CoN7SNGN4D/Y0BhA35Md

ZREyDLbg7VIDCHgf2Y+tSwTrTFpiEU/M4Ljg/3gI7O
isdV4TMoeeIpvGnfQfftugL1ctr0B3c0MbzW3NFV4brrrj0GKFVCixFpgKAW5vfY7yXAUIM+6uaj7PdM

mnwCv+JxFp4uPw0mlRJ2RNZXok/JycCSuU/VNpKrddzxmWkuO68b5W1+vqAqYiMKLQ4tG4Ebu3C5mg1m

rHRszma4c1T2c76QAU8gqTakyb78kNHh5+gFr3CKZH
s9BbemwVL5+7GDw7LN9hC2h4iTiE/Ss1pEx5ggg3Se0nb0UiRectms55OH4HIhQzjxJZ7qA3tg1O80Zj

Dn5GVbCbzUb0MIB++kWp0a9MM99dpg5HCJt9vmO8IjU6MRr/gdUyVjg7P6khy7eOE27jUQcdMZkb+1Fj

5zk8dAna104YX4yjA6uA8Hm/89DIo9bndIhYXUaclh
1Jb8/wySAO1NFl3/akpZtCyh3Juu45y9hGKW5VGSME7V2JcLxpQ5HMbLxnMQ/B+0wSPlqYVzJoDvfOK8

FXvD0WTVhYSkvuTYBf9JWKjWmUUwmVcCRRYCO4BLVXWahChLYVFHytq/b6AULcS36wQpH65rOPAZRR1e

3LWPCct3R5cXq0fQNgh83+vBAGQIwPFUumpnf1iecW
d6iT9S7B10IyR4xHVBsO1iTR2M/jZwKl+ZZXAvy413iR/OShgHGlUidm+GP59WJ4b+R/HtBEK6s4SJKt

sXR05ee030Pcm/nMApEW9VY1FbxwfpXhS45vFRQ8TvzFWL4/e/Z8P+00Xmq4RaZulDu64S/XHi9SedpB

3Kxr5j3Q/+e6p5REYYiGvjsk3Gi6wDlUtAFilozR1M
WvOhbDErDgyIw3ikS0f0c7OLxgDvViP67eUaJ5OOndj3oe9v+enGKEezOJj8h668mF3GAOzyRIbprB49

4mQFZ8r5Igy74H/pzl4SWC5C0vtUddVHVvT1Kr2p8V56G08XIn3W8C2KSMTxKOhmjqJHMtWoOsTKtA6V

l+4z5Chqv+D/zwGMrWrGg06Vsi+OwXnv7ZzzbcKRfA
aSRaNnG0hkQlL2hWyYr9wjLKGe4WHewfzmT5RnK7w8VUsONbjJgF5z/p/pedQYKn0o2MS3L3zWtS4Pp2

x4cKfo5EKjWRwCYa6AkcfHdq2dDym5ddBbETiFtt8FuNizKwh4GJwDsiimeaci6Ui7SOV0RTX9rnt3Lt

q2QtvG0dCEVMamZiIoSuqiMn/T3dYwTWNpebj2VkY0
Hi2jlDhQBuPV/sjbTwUQGwYEYcFrUBCfpsV+j3L1eVpkZeLJQ6u22Fp38nRPt5I8CN9wkfhgX42ufxcK

0IyBBnHWD6QoS0EAg+PIfwbzZ3W29Pm1lHL48AxuBJuzXOFeIEsfU80lWHE9o3OYBkCYD2HCTX3Pp98u

R7UAOr5/R10L0olS+5Wy/nnlRXbPv0iXp7vW1uG8bN
x5P481BhsRETRsPp+YyXLRDya5wXL5H3v9fqA8jvTPmAXzF7ly7BFvwOUkWiQjGoyXqwZSJ6b24dutJ/

lEiGA4z0/QdWqUwjt6JyjlH7ZKMXPHcLL4RVnFgaeFp9S1dFgsLmcCX+sYSmVztGoiGWkkmu0N7GKyfe

lDd/tTrBLRRxtseKaePWGK1Otq9gZMqOH0shZLR7ay
zgJRmnRSF9PlnEmJM8iesSjoKWpSlJoySTk4N6mENkLx4gKSTjvXMEXO2gl60w+LXQVBzlRxS6Ld/MLo

p7/jkbdkoDhl4E0eU0CtTH0s7FbtqMP9iLnSuoEQt7GrNr0F5waMwSL8vf9btB/hQcK3WCKD+OmOkPuk

0DMhimEN13qkWh7n318h4XUxa3i6fGNFdQUC0iswIe
TpKfK2IAxGQGjohCUTcC/5uZKou48rj/1BczLPD+vxhCeccOZgyjpCe7wgN00z7WMf3wQI3OKYVxFwK9

0enodCqjgO6PUtKAEFsuuTXsln8MIBRo4Py7RwHE9I4Voh2b0k/s+5OLHPUSaKsQxzIcmm0sto8KsmqS

+9MnhpcPUpMBmLtRHLjFPSeMhWlkR534WKJnEaRn6x
X1Wnuguim9d0j0Ay0GFA8C76kBlaa/IU35+yeB2P2hoi7U4vk3muzFJDOLgH3frkFW4MW0PoSvGIp493

f2c3ArlRjmZk1xCzMf4Wu4JtbMzFTZbUp8RruOyr/ABCe46bm6Ic7cmeV9GtCPscOzQbWXNtjTOrmUZm

1YPqOEYdQZve+vEZuWf+2o3FT03wTL92otJhzYKqRb
TEbFGv7BK53/ev7tq/nCAfDBDHEL4mQDVUsXldhxSpp1uZcPbV6xH8S8pELKg4S+z0lPOE71/TkvWYzT

D0cVgtaw7AIMqSUXqBkOQ46gIlF0f4aAC2qH1mDJQgB6doRpiuaRPBO53y6qG9EZGlDH0bjrlFNSQllK

a6fhlwkGf5O49eQ4Ec4zqms8qS94lN4yTablgb8yix
Trm9K3asIHnGzEPcGTkj6dZrcgRUx0OhCJz/Z173JAb9Wue7Y+uOkgzWGUTDmlm/axmDzHeqLHVIuLYZ

CNuCJG4ilyUp7FcnnytvkfRVAmPB13Y64Wdb0owx2kPe+OvX0DGLX4pLCn19CIGwNa86VqUDv+LC5HUR

dxSs0BRZxNwMhSkRZpAfEtN825DeQpmojpdA7KYp4Z
q6y8bUMbiBJPgWO488J9KC3YyJy77wwYIK3PgMG4yOfeaemEWZytLfF0MhY12Gmw+kpms8XKhPivQ2L1

xyIm+7U4BcJKMn0GXCCSLZG/GT9MWdKlTnj+tvjwgzxScIChSI/Wmapg5Frlv0/7QJZ334NdM3+l++Ob

2XQYfunzUEsDbSbLuLldmfg07blfPiicYpteqFthQy
CZf1DkKUapdt0ssTxY5l7cZwR82uHO1UvvBJ6fAZ5xA6bGdoMG6DksCLhFoJ8EM5kgsVW8nrcz9P+SPk

eQrBdUkE+YyGA6kxqpMl27KUsJOemSg5q6gfLhjjUSQKjEl99Pf4jnes1aSEv8k3Uhg1cDGxvQPKzcrn

E4K3gbcW6wX1VfnvgQdTpZ6MHGfFyAUP0Lw90vr6aV
vZ+Mr6YB2hd1XSJJRRBcBbfeOzsUyvzzmltuTGc4nj3WQtSzdkFNnMHIvXH1J7JYLKbkX2x8wmV5ly9f

zs9kM4aELx7Tv4Vnz81XQEFfs/6sCiDg3ye835yNClc9CEAwb4c+Hdue4bWRw0ayvWkSAlg7v2Px9Ouf

5xfzcfipKy44zRtj9C75AiCRXL5RsUZv1Rkv2ddN9x
b805MpTCpTjaXhdo5n7jkgnX0arn4NgqS5/Qy4zXYn0YjMlyj5FlfgQ5VFdfmqCdmbBHA5TFw+BQHz2f

vbV8TebG47maEw/GAsMxV4olWjJ+fI0VuOxCRMlnYewUxPjVpcQ3iBUF0XXCSh6i/EmhqvfmGSfYvfw1

wwibw00nK1WFX+xBBKcczO5vENG+tNO68Dw28cAh4Q
z8P+zm3sEjKy6bqQ4z/hJOYxVxUisRagI0KA58Ofs45pgUIiMve3mgSKBZjqiqnYEz2b4JgXwAHFCowm

xrHRY1AOo/aIf6iGoym5PKO9+sloGJU8Qj2Qnr1vGXyuPekfjAzS3wwJuJVBSOk5AWAhcNHV21gMF/Ij

xGPBBIyFzC5soXmg84IWH0DpGFcMSuHavwxxqf7aor
FnLuX4xP5aGvQaQyU97LTWN0MYuXyr1WKk3AtQ5/T63ch/QoKkrxXRogppvQhGYxf+1M3o/+TlVWClcD

ZpoV6DECx6vBOwyf5u5PL2Fw+YLfVi9OfmGm8WBxPumwzh4M5GipldG1CdzOz0HUgt6quCWhqq31XwuU

37iK2/jxqOo/xymokHKk9D2cKvGgw4mcVmU2pXfzsU
t93o2yGnmTzL5JO1XhhiGSwYrm4PurWDUdIwqfU/ghcYdYK8kkAb7svXuqj9uRVvm0/dT/ALArGgO54t

fl0O62vdddfdSNJ7M1ijn+mgv6iMwuVvuqAYakSDIP4T2AVOObMzWgSjcsfjiyRabZXHUZC22N4WqKEt

O8DKtaZvqswh1xmo7yLOI8jtnNqB7zMiKO5R1w2Gw1
oBLkVQtvlBApRS3G4sza0sECR7KewdGZvgik2aknaXpgXb8ZrugwhNczO89HzgT4aKCtUBv6wBZYR9ta

RTCUu8g/Jt9OUT/t36HND7s3mt4rYWs104eR6ZQ8el2AahzTZUSc4pIfb4YoNo1+7AyDFsgmrGKAgAvz

wYwtp3SvrJtPvOC/I0Y+pcvwMCWAwUiQQcSzLbeQ4V
kssnex6cnAjF3BLVbTmj104yWQxRebmamZFgXRPqJK+wt3pVefTQHQRKnKT6+AZJ+hy/FUbBkdBxpW6s

zSlf8ykHjxb+j9T7iOzmK3/zaLwuuH93GmNMSGeV4bx6vA7GWNH74rBFPiD+r2aFTXJla3rzl2jvbNHJ

sL2iDjJR5lvk4DYYWk26lbKBFD3R+92ifSxKIMJ5Ir
2xIyIhHZf9lisTet9kYG9C9CSYdCrZZyl0Lfe8qIp65WpEI1pRn2+zkmLf7/ISJ8YssmyT0Dz6ea+c0i

viIHukr3OopbCDt6C2JwRLpCbsEhLYE8Tz5XDkmuIR5/IwLo7JDeCOmqljOkelzXNGkLx/49b2i677pP

MhUdgmbx8+mKuORAfaGA0qaGb4pqal3AreRByxrK1R
oL1Jdn6EMvmI6YiBaaM4xW1iST2veWRkfivThjffJquazmnNQgBrAq9c8Pnuoy0LwnKorPgPdJU1XuA/

70tYtthdz5Hrh6zNNkcyD6vKOpGs+xRc1v7KBm1mRLWGkENrMNXEfo9+5cG0kV4xv70RVDlM+/1vZ//N

pP+WROXOuSdcaIwHOJDsvFVWYaNYXS6UF7/Jo9gGzx
9TNjT6CmkFnU20zs397/f1sYLTqWeHRKwRFm+aoeNpuiopxI7li3b53vReSQt9DM8aNZ6kXsmRO7/3Ls

nU+tt7d0nj3br7sh5Mrs+q3acCpwi258hzll3hlNo4NMlECQhAJla1RB/Ymwhkn1LFMpVvhWUP7BqT9r

JuvopsWw1NqiQmxksi4RjuQrGqV4DjMI51qWAIl4Vz
vPUfk58uHAM5jfF7NAALk42/GZPywHPoFwZK7558uFv2CfUSCvLI4X5vbOwabZ3gXaaKl+K7AFvJPM/k

rniOrhXSDQwFJ7BAsvPTUwSgDnOngcoIU/uMnbIeIKBLuycKPqeogFG1CjlEo8ybKiU3ZL+V0ou07JWt

o0cFzX4hHlzgOGXOaUNFnI3SDNiUG82Y7BXEWCBIGM
DTA3kTSTKEmwaKYvFVV7TGLlMg0bO/042pW7966oMk9Z57Zo2F3R+wB6otosnj/M95w0eqeOBLwLLMLa

QMhK2Y3FeYeYetgeQzTmBIwwHQzRVlfZIvnQTKVFmWg5skIYioigA9N8Zie3rww2KLZUOiknJwIhtaCy

2zZFyyyjq5o9HhOKoAsiu3dJReGR5BZXKcY/0zLefz
eXO+AIudkzuVbaVFlmX48CVUIDEmKql3KEIPjMFKibNgSTeoV44AqQl/hQBBIm256oCw/aMX47DsNUW0

voWOp+uUZpRIBrmDnpKxAiKh9FkN+plWo+9DFwKGJs1Wx/iLVcd+UpJ38hykQwScAF+VjYOL82k8wr+k

27oiMYO3VqSVKM51lB7u43yOAA6EgWhcCMwDRDQNjC
ZwC4cCNA9YAE7oXxMcmYqUgRsdZL5bAwaVKILeYR3283S2SVox6mQs/qTeIzn9svBT/K+zLuZd76q07/

FVmyATtCmP6mdur/A1/5g4NS5kRDwHLwHoxPKv4cMHyhnpNp2hmbKuI3Is4ge5K7RJDcAqiLMLhHDvRS

538sOJR1dXFocfVApw7FvU+hcbMw/qJu5qU7brUQ9r
LMugc9GGbh6h7coIWypYPcfDFakKbqInLfDHKT5Ez21ehyO/8h46X7qwfr8jRa35l0IfeV8k1SQBAdmH

qrbdknQLZrJn0mpp+xBb/A9fVhWDwKBi5vLyk9KHGrkLC2XMhIUgHUpdLQ63dvuPpJz9FsqUkCRRmm5O

+nzlquPp+DkW37AapfB8+LOradTdU5iLKRcCB6/Qm2
X3K+YqmIkTf/Ko/eIYeOQLSgbkR7ljjqdcxxfSKe2+A1N0Z2XsAkJKvm1IRxFDzPCTfIBXQL0vUr7bJF

sX/K6wyzjT0/pGLDfwkz9wfTTO0V6cOZFHbHhpPGzNj49AGF5wf9hcdN1n55bhycXuYM8NJPU+LrrWcA

R6QpgBQiqV8loqJWt09TLivrd0pDyRSvo7bwIKy5f4
LoCGx8/4pPp4qqyJzBCjlHtwUH4XXMPUO8NSrqYBXHdjNJZnDkF4735WRu8QKVk6vT2v0Nwok4H4hRUB

3FWtqbOFupn6M/8psgH7rFFBHijuWj0z79QGU4ExhIeMF6WHXWkQzg9X6RrfbmLL2rvlIdmdo82Hlukg

umcNv4ngXhdUo8JK7iskX5xNvf6zQldtAkuXNXFMT5
5/t0FSUsoazvtgYvGHUMSyMD4VmNmrSNh5P6l+vs+G+XrPinmi1pj+SvsNC1/pyzojZjavuma6YrKkeV

39f3WgZXFH8N5WWqJ/U/x6XYkj8H08yP+9LyPZ/TnC6Yf5Do9nx6CSGffzblzlBOQEokFFJF6ilGPN/K

3Fibyl7l11ZyL77ygqb3jk8UhnaLWVy3W+yOM7oOd9
trN8oN/q783ELWD1pEn19EyBZ1/Sai1ITq+VI55vF2/exhWcusQGIH/jMQ2ygj+Cd35mXHGWGkSd2KQK

0Ox5MstbOT9ERmGGhipAbHNe8X0IucscbRzuhkwhg6jMxLOpj0b9FacEexSRlStoX3ZTYMfIQLb0jgn5

ybQSwrSqTpf4M9UlhMxjtcJGY4N6zhlNNLujhQO76z
iKYOqf04cmZG9PKr7y7GQ6rCaIM17swfeh4qHs9Ze9hSlfpppxl3XDAX42Q49AGJL68Ca8O5014wNTHQ

BivcFM4xUKkq4JO6ZvNzkf90y17+w1+DjeKgiSQZ6fqg6rkYnqwxC1Oz6Y8NPn2GOoA/ilpBy5wJl7s4

o5+0R/aZwf05VNg3XqcnsFeH58PnObdHO7oAmwmGZ5
xaxJopuwSyeg+clvWUTFrMJBtwUkQ116ViGQcl3aK9EUX/POoKDcIf0CzwHCzUwPUWyax+9+hSE51jPr

5Rm/XKEzaI8yQI6A+UyCXuk69oC5THET+PrKHruLGkDHGnIfrYzanumqpVzNme0UGm5lYegTTrrJ75Nb

wMeXSQoXObW8m7jyb9L/nl+1m2ePfBqSYz2ZRKlTzD
3SZyQnxRYr2n9ax7I1qs7WrVTJRB7N/pObKelrYTdjVXiuHWKpePhKgGtcZoztIcjhc/JneBhOalDJ7Z

ofCwsuFzDppqgM1bb3C075bgSfBDB6JFpkAihn1itP0ZVlL6uHOo1gmo34YIrtu63IwyVTRjnXKGlXJB

E1GIyPGI4YRqxllRqm43UnnC/lsONLU+4iEY3pYdre
8j/tXQxnNo/8RbzEuhIaqfJ7HdaoCBoty4O0t1pz/lvGSHWySiGD21GmPkbPbRsl83MMnXqdryB3VRT7

e4WMMFgBnuCBk+ZmpM8GV/rSQ28x0Ov4M44DjZJniXIC/5duH6riixYYBlM464xcMkjeGZJ2/8IJ9A3G

0hRQjB0BJt+Fsm7emIsLYqlHEbaJbLK/YRlBEHmP9g
t9NFwkrw4YHK+BgGIEzrgqzY8u+IXoN/1624yO6ztl6tKqfcaZNOO6xZ6beGFMj5nQ3xWqUYtsFm0v57

fgY2mLIXTs0yXmzr8C6dNBUxiTxo5fTi1cNqk//nH8j/pSsO81th3dq5tJ7s0sZqGqmGVGouRmqH4ajP

5Ud8W8d587AXoToxyaU9OepYr03yq/hXch1ZLu1JVr
z4Qcx+zZqFIjvrrK84gz0D2Eiru+BcuB1KHonDmxxss9IB+0sB8j9iwS4SLKoViMJh+sy/R4hZ/9X6te

zw6XQro6241oV3enCIS62Dk4BPFNDLcu9WX+iteq+LBC2WlgHeF+6mvR+q8HawxjoYyaxw4SL3/oWfCy

DTa56JYdznX4xSoUZ3UiF7NJrGyZ1+9AbIrwgfAaqn
uCFquNX/fVOFr85NMMEdMA661aaO5yqBsyLpUhLZkd1yjnyUiSG0CAu09vAjNjX4RrSiLn1pwu89Pxfd

B/omV4k6hHPi+o0c5AhqQDDe+1Y7a1q3KHvT3Za56zGdyZMmaqvxFabqA1KnxbICsu/J+jZu21+mTm7U

0WV0UUQslnF8ey8AITbL47u+Z61AjHKZCAeaLR9Zfn
GItwA1c5P+oBN3JTUxJvKHsoc5eLhSbeNNvERUfLbwTyUUAMKKk3Lo9ZcViO4rTpdKCNSeBU0gCWPI+J

OFXRP/z6xOF/VOWh274UTelchdYWXVLS7NdoMpn5+QV/tTfTah69hQqLt/+96ncIrtJV4HwWGudaI1CU

xaGm7k6kMRuBCffgy5FuSWwOowWZ48uxzd4iG/6iFm
XuTV6xwk1zF7MaU+gHoJFRnuYTVw6/2FBjIXqS2nQh0y0DYmOtqgqSYtchD2FYt1roTaMl/b8QHk2A72

DA26kZiD635UGEr88G+kOK6P4Vqsm276u+ZhJnYIjbuCmHMIDTzfD9f8ydAW7hx1sH+u1IisL1DoR5Oo

ejfkI5Wd8u+I8llp6qC+LIK/pHm3baOVwEtUuGzKpO
XoSFfUnAIGyuYgOpJ3BnCb6NYFOMH6xuqYQIs/ubGz0p1GsqEWNolgyK+xfu0ZMRsGLWwF/Vk5lHQER4

szgjZYvMgcio4GNarCnLpnwEw+En0d0/8JFYRMBjzjJ9P+OVZJmZdjP3dzGkMVgNQANOEePLnWQePDbb

3g0XeStTaJzYZtmYAe0acgwdUhYpAfW07+BHJ4Mftj
Vfo/natqr1mMeucrCjom7s2kQX7UEfnPq//r3owea3HhbPtx1uAmMTX4FiN/I2+uEfqIGrrufwfyvpUc

J8mjznFT2vfF1jZnh1K7AArLIh0B3iY8gvhn4ZPvy8He+G+R0sC/vUKcVcVXxMYKk3G2iLxeLxtN0r5D

c5t/BUNO+knlVoE9AeBA8P/kS/qdb+UeU05IJfIUGm
lPhdhOkJI8vzUvefebXmLKD51vcr2UH/GHuJOUpchLbKnO/9I52r7Etfg+aLy0nGMG4ZBd0VbyQoAyYs

SZKTEwIoUjT/6KcHl55UDc32vdQkeBBhgi5XSJ6gP7RRx3a4JXXcvqlagaXLlN/2ue+IUADy6uF53j8I

sAyZiNH8/bgIlgNL9pNG8wsl2LBat0/E/wNkK1DO+w
gfazRnv0SWBwiWb5BqYKaukyml3qfNa1XaseRAdTwMZt9rldW8bimaS2GQOj7K22SBWmZWqylGRlpoG9

T/ee0S5v+pmLdwjX4pdB1XRFw1oReI90JVOfRQ+SkAALMo7xWChQ1G1lRoWKN3Lb635CWLX1q5er2nDz

SH0GaoJSmV6//a/PFjNsicPHKOv2uwyF3DRh5KjVxA
/Dkd+bXJ/EzT1HZMwBqscY/C4UwlqQ+k9mmsPFy53CU+Fm3jlCXrahdNc94XZIXjkMCoq9InEvsDwOpL

GmFJsJukYQlBnp595vlQCXJkgIag+WXcU9B4ZECOJZWVRwqUqzHgu+Q6/y5W/k6kDt8Ef8sWkSG6DW+e

b2syXrtCH897as9FbdFUgkjtiYQVDDsXB1JMaulpAb
7XK1d0uelEnOFIiEaNyxoFnutEE6X+gC0MYUUMS1x1YFrrzhGxBsSEFASxY1YMFv3pgi6B/YNa15lHIu

i+OGVOoSDB2iLLq2wK5qvOzs72PrjmiU85OfhHVxW5K5Tht18uR6y4vl1zI4gEZloqG9+3d/WrNtF38K

YvYx3ya0JHfuuwM7hqV1TnF+IsuazteyB9crheObK4
GV8o3iy8dgGM5iLdJpKF384IgvhCkmg24mnenHe2F1pu8utjMMQgr+m+w4NKmJlLHxc6Xf8pdhvktrh9

z03cILI5T0VbFtm/aFBxYojr2ipBa0V35373v8pRq1qRdMJJb0f1N9mWpX+glMl+IUPysh7GGTUUR9iz

MlPLIqY6zfJOkUFYQOU1NparPMnWwUDMYxS2y6CbvJ
lUjUYWGTaGC1DLNrHIKfLAPT2lRV9nnGNEudCHOf5T14gyJKIk9XCF1A/EqluMOh9SN6fXvcbv4MycCT

CsuoJxSmakYd+P9bMjjli5uayKOhikWePHbNDQPCW4aiGV8wGlp/CstsgF7Fw53PA8da0WN+2zktv3X0

8ynXPVxZkUWu3ELz6fJ4k4gQVu8U6nP4UwUdb0ufuW
gX0nZlWc7UeONZR6oAdbAeuiZR5tyYkoBH+/L52zId67CMyRU3EcxN4EbWJT0UV6hZAVwJn8HFWaAJf6

WcXx9xCJnCSncS2Q10pcF+xufJU+9+jszyl/w12PR/Leena+y/UI6eBbT0kUc3+hmcImJBEFsPthn7macY

DKFWBC8GDvw7ivc1oyX99gVLcKm99uMeYsVZB2TukZ
aNxJSBESTIi7xa517lH+w2d5r3fFA2DNumSFyGjUrZy1QMLQVH7VNjF4I0Rg60sCjHw1Q8egjoFSAijZ

YdVATFs0SnI4ONw55vd3Jm+BkYZP16h4pYfdoYm531Jj0vCpnAOflI8ehdJpm6EgCaDYG2OS+a5s1Z5F

LyOD3URW6nknuiwnap1FQDhwjLSfeA1bCH2kE2/lID
0EtrwWWj6xiEwp78ssf68BkE/bGOKBlY08h+WvG6Jzfy5ZYEcW19erDEVO+yfF0Jlf+DZzSeAbp+/OV2

0k8Ygfl+FqrtINEpqTWISv4d5pDj8YaDDy/gG4AAlcZikmswPRa9En2GVedeUNxl7FKP8J7DL9B7gHIv

GqFbf+HRGRdZBd7JiNQR0zSZJWadJWQTgCRnWbVZP+
mNVwve/Q2h94HVGMeQiFB4qtfDMU0LSe/TggQNFk4L3zZZ/aW2uiO937t4ljuXddx0jvDgkza5Xv0uXQ

zVHmMecJUCRaL2CCTjjGsvFobQZ3fQLSUjft+U3J6Vmr73j5+YJA7u7s5HL+QliVC1yZzA/a/B6V42RS

tvDmD8qmTI7TCKaPTYaxCiKmGDo6Q710xkyFSLP6cF
ESUikpg3tgos3F81LiuhWB2KehIOfjKWKQDSwvXUG8IsJrhzIti4hx6Vu9JPFiz3lwH2aZSJurQt0ao1

/J3bRQQs+4JXEhBXOWxCoK7WTvi1s/VX4pZOr8s+Psu3sRQwMXa87H9hX8xM9We6zEVtZ4ZYdSdQvWs1

UR++5g66LmI5iL4mWfAqyi+2UA77u0VoqqjSCOJLQx
bPFHeBzxCLMbyOmWKGdsck9z3dSTGnbns3U1tqsU2gzHZonSsQ98Dy0Z+AmddtVsUfUa9ZrHC6nUnjEN

ViFDPlD1J4otsqy8QvqaRkcjie8PZ9tgJXn+kUDhVxi+VL0Ox47ZsLI50mHbNtduCkc/7KLL5vw0GD/Z

JkjWvHd9eSD2B2Q+pciAEVW94JdT5YaZ9rhog3QAnh
JWQYMiE1XNjQiTqaMlttyaLieLK01kkgthCjgsyOt3t/AA4bEmzHI6Vfx7u7Xzd12fCWMSaYmj5ouwiw

IyZeYZPabATgzqS5UWka98U+m+z5plIaIKih9l8WiZTB1umqUlLg6UUgQOJQ62VS+7BxBlcdnsptMhIb

mlhaeSyRH4JfD0mxu3AtZ9XVG62UcATjLzZUfdcBfl
1J9fRKv20X0v5DO0MCoZvEt5j62GzkF+YY2BQ8WjzKFnI0QUy3PIbKh028iNdQJ9wXj/3CRiqkBZbu0X

QKk54VFfGO+4AlNYnBQxAp0waLSt2LMvb7T/0MMO28F0woqGtbS1jcXB5zPTl1wPyp8w5Z9E2sHkgSmA

HEaddn2Y2O/xuiXH+T6D1hQFBZQovKJi0popyfJCph
SC7VxBKAUx9C4r8HeLTLgmPK5P9t0djYh4mZowvsnqdP8ad1KAbw8oi8TTde9E7XCqgIglaKj+hKen2E

i+1Bc4abkrkbgC8QOweh+2oGOD874wWudNzb5qmWW1J92zhaLK/AHqz4awgKMRPUvtU03Cjx4gXz0tEn

phRr4vzbM6YtNYLjtT8WIrQfT/F4KrtKpscnyQXnDy
GRjaJh0l9R0VLV5IH0tf1WSiYOxitsmOa0c+fjndKiVzhdtLrAg4rSYEccGBGQNAzBq54h0QnWPH6Ngv

0lOV6bqLX6nk6kYc++yr5uwgV3CxXzUHgIP54i+9N1E5tqxOuw54oiTuGQaO+YIexKbgNrLok1+ahPnc

JgtvRl3DB7zrH3P9gqKBeIWn7yqtsEGVKCB2X4HYe4
9afh88DD0kJkhGPfcLzGUmGPY7TVibfWko2GVuSuHTwuaK3kb7uCktnXTqoqysMB9dNXdIOdy5oCbmJl

7eZrFTw/4NayJJbEaadvDfwmuwBlAM3mY6+UEYYFP+gL9RquBBJoc50pMJ5ztMumkjEbKnznS7ETM71u

4y1ny+53i6JO4UbSu3biWGRxllwCKYaYEiMTV2NH2s
vxjigNy3fT/F5484Gyk5H5atQ66PFan6QSOgNGv+L7wk7ZB7qzgZTjoxg8MMY1Fgoa/++56rp5X9eNod

hCIFt3ZF9bZUVygrimIJh8kDMRQe0kfgBSMweKkpz68n7Il0jmvUuc+EyejqHRze9jZ5v5PHA5qydIi+

lu5Yeu/9q/Fa7k8CmpucjNJ76+qAWfuzd6HwBX7RCd
9zsv7zVkLATAS41NL0tLQmWWpubpm80Y89jlz4b+zCUJUJZBmUxCW8gkoLFzYx4qvNtDC4qdLTfC0KNq

bO46H4e+/wOB5Vx1zIlDc9wiseo82Cmr6vgk8KohMdCbQMxh5mIJ53HTpZoTPDh+8Uc58Dxq3a7MY2Pk

zGDk8zqw+CKGw6my3S2bQoqXg9jHwZx9XzsuD4iq/d
H2NeMrcaVfCGWimiLsDdnNdyXS8vfrLXlpkyQ8fbiujHVc0ELD3yQ33m/lkSjRQ8Net2K3uOg7owtYP9

vKsWY6lC/xZy/a03/JkBfPc6Efdx3KyJwCRC55siFFJKYnDf+jgGOd2yatO4BN0DrtCsaa/Sf0vYUoO2

4gwQQ82rxGyEnmZfevt/F7McUvD2I5LksvAk1xqhww
oRPPwOFU/rHXLDGH3Ttcih9PL+CLXmjuiX0QpQHe777eY9IjYX2G+aMUoZo3iIxeBSmhYFQpf1nGmZMp

lexV9OpOnprlQy6avktus5w52Y6ScyYOUvMWBjJ5zENiXkRBpyxY75Ww05BStsp1Oh0bVCBQNmSVlBD6

9cR3C1+Pthqou+pQCHdb/5ppXv5GgGT3r3dTRCjSyF
w6PlXWbihc0+yD0dv1x6D8GznRBFaVSb42cAhbRbD0x/ZvFVBfWiBKI/rYfdsp0Gfla18oF+Yi7uR90E

tozU5ICB7woWl2W3PAPJrqFwpGh4yY2vpxnPfMv+hvhBPyM6J3VAkjLWFb5MhrvWtQDE7NiYCxzFw2/Y

HiKHsn44jJgvee65Lbc/EFu/VnCMzC5119gtd+XYTf
DezXJ6cQUryQfz0Vgc1xKiH/3sfw23vGF/A+qMQVvw5BIflkGwbjX2OibVbSr8wNpQIjov6hzaWxyrN/

Y+6a58ElkE/D91wyte6E30qJXFdnU35vKnZeMDayQ8Ge0GOV4FkzFgJxnHxKQI3osh3HbTtoPhvg2YTb

/+ZzHFiF4PWIiMw13pNJJXLwdCLl0cTK2NYqSNQnxT
uH5prRk5V177Kuqud14ZKU2L5L7J45kvDmfWHmZZX2FALx/R40bKZqFqCCgfRSdqF2/D247FGOrb3eQQ

MI52OPu2GyrY5i0xtF1qQAEwa8qd1OQpzvzNw8bSXTqa1ywiL/jig06KUp4GxQAtjHpHyxxfF0x/RP5h

nwZfXAdf8kFrWwyC6uIuFEfTwedwW7MfbrvmXiJrin
vrBOMjf+oCmS4pGP958QM2C1QyH9WhYGmfa6ky1do+h9Ojg9PwU3yi8U4qaGBMCRMhwaMyK9on81HLra

isWCbFfimeuNhp5ddrkq5QdswEVK//yvJixQ+gelsOMUTGmGKDfnYHUyCe6xP+noqa0DOQXkBtowBQ+x

Sws8fwhHl7+LMx/ySsAqWGXRKeYHJ7v10sPd/rBusy
jvaRZX4K90pSbDKuZl320seoDVB9NoC5JmjryDDbcL0rH+E8UP2I0zs0zFsxDhDbzryTxXBEHB88q74J

kjcK48bkCuJ69SuQwqQPHo4hFotdU9XEA55W72qjjvWlgtQN2c84fCDZUwzG9WYrwiuK/mVelBsjuIw7

joOtuVziEcVbrL2Rm5pscEd9Kb+J+yF75axBu1wH+R
yCxU5L5CQgyhS138pRUvLVgKIWFiMrzJgKwD23vGvusatAFiFpqt9V7Zkh05rjUI2In4eTHrCer8K3NB

kyQL3ZQOtJF8jwyWzevqTEp72JAXFbseNpS16JbLUhsQz5Co35KikCZW4dGP292sP6E2+Hv/Sw5BCJBE

jAa34QqRrcHsRMLOshrv3iqQ36WoY9+PZl8RZnuPSG
o0ouKqm2iv8LJapVf6hxzzZe147uaSHq2XRgsziAih5eBDgab7X2ioisE9E0eYTGW2iVyNIRCa3UbeWx

wMFjaj+eNwQT8YYHX6pqDo1NRi/uRMWSLtXBdsoQRK4CetpicynXj3urlrGSFRXt4+2qfpErCeXFJleB

LlGROkYnaTjSGyZBQpmi4uuSSemYdrH5Q6vAcFHngc
GwrEjMR99/++Ln5a7TUY3b1wqGFH9oaRkesQvHmH4q9jJ1Tbp9X6eru/zRgs2JyuesI05RMzRc5FDjzt

j689yJQJBYCKocwgb+BKNosmVdWsofQIqkGou87U+XxwFyZSXqIB89kSk3sFmR37fVIfrVz9aLu4hA/d

GruWvk2bo4vICYz4Bk/JgNyVQgJk17JQ7bF0DmtWzK
kDiM76aiOmMeQZ6z5IfqKGvCvIomrAvCRaX15IZsMzpVmOJ2SiDhzWXbmQWC6L8fKZ6gpaACPcBWsgEO

4mlwSa6DEc7dbjv1MKk+CxnPLaqgwHylhWfYqRaG2x0SJsQrMqv6nETw+uweGJ9k6+nmVBojaL6wFapH

crRsNAVgJ2t4PTheeeeLpHwL9wsyPjfS+w+dkTQsgD
hJYh2C/+Zz+dUeLf0CmfHsivYy89C15xpx3I+Ie93vyD4Q7WRgJ0Hk5TmW4x+EA3T9hj4E2hbZglCpj4

wXEcVdjK7zHJ2QBP+0AFkdn1Onh8XZzR0P9kvGp2G5aShRX3T4/sywOXqtvWmRKZdmFRDmEOblRJvpH9

u4ESTvVirqsOffI56VKNxYiGWYwHQ/I2kxikqKDtzl
uPA4mXr+LywwfsNdDHhelNafUe8vWT8VfbYVDlD8oWg5LFWx1E8ZMHqy6Mw0+jWwTQlWsNrEdzQfs5eQ

YxhWP9YIwIN58y6u7vmEKJfli6nYdS/au4PFuy2HyrM0OsZKZs0qyDxphFzDXcHd7kuhZhRoO4f6+tfu

Adncd7no5Gt6H/yNqutVjFmTrNi5UXZOu6AKiSO0sO
vmCy8ZHc7zO8N7eO6tc3JEVoaYpJMloGasuT+n7PftbrrMKHFGi2hDo84jhbPX2+MkWKkOqficcbyfi+

iDRm9hqXy0Kb1KOlXvaWWpSTt/1kYz7eQbMbbVQKOynn1knWog2KmGqjVPlAr27Mwn6wH1Pk5JlGdm5A

RHrDfXP0QbXNGUrXBjRk9PGVOcMOBVVcWDsNuPZNWc
X1p/WuNDs1aWZG1vcXRH4idHBASs3tKREJ9ftI0Y4MgAEENl7XiB93fYX0xubQccGYxjbo+4yrJCRwm4

/CHwlyC2CtGIWlZfbmZ+iyCQqRbL+60mBibKssZqwSYQ+Ezpl7TIXCdIhbgerJQeXmfEid53gEs/ESvo

WXQ5XAIIRwHeH3B5l82HgLLCJWDY94UDPtBTNG5afE
to9xdoVFi/UZ3IPazSlM/m5HY/HRJFjm4Fz74dfOYC0aF6tcqHHw3SKtk8RpcJa0/CnpiXeCJOYVdQAG

X4XMNbX166l6hMM77jMRDCvudBpUgs1VydgMIykQIwSQSMz3ph1Cf1iCJmPD+Gy5/g7JBV8gWTQYpym/

KmyezUXGHNMU9LBDRhGsLzBtCjkKTa4PcEDh/R0WGB
MspzPrN6JPI6I6z0s28nTNYvIS71qEqC0sWZgzl7rqzK2DuhBNXmKEt61eUVKiWUKTCMobRyXdb8caYU

KdlC2/582T1b3qG/yPYvqcyGF4EbSJLuuQ1mIGvQGt7zj8wzC/6GkKx/7aBD+RTmTDQPTnckHNP/iB5U

VS7K7AOXcH8K+0tN9Zn5hmpWVs0Zub8VjEGqSPo1Tr
yVvQuO1LFqosQsqAJtNKu8JGC9ZEBAHa26vx9qo6K9/CJPYC1T/gwj2wkMZJNnaL8DXB/8VtYoi+lILP

7qitGIACMYeoBlBCjwx0GGRdFEJ8ftiUyFbUVQfh5f8wKq8e5g/NHjvBfkDCSTrHACvTTb5fx0tOsdMP

K4f334Bcf7d67BCpK9cz9tTy9TZjPaWvUBrxl1nO7Z
h4o75s1sWsj2yY7jnC+XkHYl7AZH6WSl1f9Q5sMUnPaWSKw89ztshwlNxroBxNi79DB8E3fIdlcj7d8X

i9US6wUhrmILTtTzJkEWCXgaEUBFSdlYK5YFKzi5Ud8gU7iYKq8ehdBzrTUNzylCElbus3e2zZws/1/Y

YvqATrm5nLH7vD3mO7Lg9pGBmQIt/t8e7pbYusq68R
2ymjwiW5l5gu1+teRzaRlmPjY4BZHt2dKN6M8AZDIHCWKPbFE69iiL8dWKxDko+g53H/BBck6kEdaAyE

UEtK7c2To01Vw91rPH8r9GLKoOXtWbs5U7v8rDGtqnXQDKbKhxol4JUo+Delilah/MaTFAXXuGa3GN3T7qd2

W+9Ivpej5lJ4dWS9zWkQsJUnk+BnhPzB6D33sZF7ia
Emps0Jteh8W63Wseos2Ozc1DmHbXxM/JlTvgrMPZQfXiLj5OCehHxujl0stIxwBHJPJP1sYHCRaj2GN9

x7Z9PcrJ9cU/fHMByrv3ny4Mr2ufeRGfTa9tTBc9Hp1PLITxhLp6vXcvqR7DnbAX02NZQEUKt/5lx9IF

9wnmttHnI2+LI0fgg45ScotZgaNkhE44Ih6pwd6Zqr
oomQ/eFdnSGJ7FRvM+i8FNjHpgr/PvmVqboKT8ziFdtOTg6wiMcokIj8geTKQl8tccf5Xdh5gphyIBac

SYjlWF7AHKT+MV3NNa/EskZluYfRe5H/w+8ZGBCZwpQWvvpopxt1cGPohrEr8gPjboiUh8363U0/Zg/i

c4PBJnXOEX1osvtN783kDN1DYQEemEfdog7f1wupks
t6dgbdubmibsrM95bprfR/cVxrcvT8QHB7i0cz69O7w3c5Lh66WBt8rJVH9RFWk+DLVvDGI+pVBhaUzp

13uakeyCS+sErJmHPQ9lQk+Sz7TylU70aFwpMHj2N7g2sumzXDc5z1+fOdNj+sTr3GyM5w4CpvGV+zMN

692egjqR0yxO1Ff7FZlCPSDSvHcgYKWWnqAwYkRbyU
zSFxBK+UVWRLXQbJzOJDTgpZ6QIAAjYJteq7hO9bkDf8HqqrWnC5nVSeWPooYnnIZfs1QzMHaXNOJ21y

/FYKsaoRgpAD8of6KGnh32P6eFSqUiPDRSQZkNhx6ZYfUxru1QOeb3VU+onCT/iWbPy+JU+TnIZtMQfk

yUUoSiPlFTuQqEjrMor22WhlCY64eU2SUCxQkmcLD7
hADNww1hcpDClL7Q4JqEBqCySXzNir9SloQeusuwBRHkicNC39mZyMH8kuaSd0vk/gygL6gbGny9Floy

Wlx79pDlLPRei1LMFowzdYNToAiKkjPVmNnAzK3R00Mg++Sj85+eu0P34hWZkVKmj5AIq5rN/8TpSRLJ

RqnrLdS9oOBlr+JTVJf2qOwF7og5EmUF2+Ya8VeRFJ
b4oihfgi03DKV3ThqTr+h5E/Wc1VNYnjqOm88p7Dh8zNPztLdAZtpJETuNXL3IE73V+Xs+jwzxaEgnyh

z2PPgB+Kyi66MOba4PDashwFoUwfgBLjUohCqWOjCyTcAmAruCb93cEX3azIo5+mnlipIV4buNuOy94l

Jkh745V+VuNqZ+iqJEvgRxKikdLrHF4RYP/xto4P8+
dGSZ3mEIn2zkZdYoRtyHg61kVDvy547rHSJCDZhc4hv922lM0csMlP15YbM9RebyvFE4S/jCLctSJmD7

TA1mnGEyJua0SEtuQruxQ6zpeBW44vZanA+0Hig/o1gdTKb1dfvhAPvvJ7jc2q+CdeLBOAsEFY/80FUE

VNxw/d69csv9P4OocpWUMIOofr4ee4uYYWPF29U661
G3poUZu93Hmi9gwbOgs8ZLPy3Z8U3oj3+YbG7mDYg1B0Z2OQU5g7kH/VqEDgDK/BrdG2i7t7orME+cdC

7HPcNQvca3GeryPpILk6Fh18BTuDsbFerwjsrdx7SzTYaM6GqYnht9LptSMbH1ZUfs9k1YEHwGwLqNRu

3XGnP7cJDB+5WWb0u2gY1K5Ka80ApYaCq/lo1W0doU
gdbNcudNXNRRzm54MDmCrAdI/sRxOrDVZwOO+DKxkx0GUiKq2428bZgV2UZcLODtbZNZqvTFJaBB9RMX

bp4z070QRCQvq9s0u8rzAsQ2fvn59Se3zPZ/4tAiD0yxhkjQrOvfC/vstQXSJp1moSXo1+FFn6xXqeGD

0hKZxCcBNSSJGjJRhHqZ0CNOTd5P8cxr4feZ4xVdNG
Hp0y4vuywl1oQhF5u8ZFlzSWnt7uB8Fwbih5Zn4qSHmMOP9eiOErk08Mqtqy3OUlU1op+zTqelMrb87x

g2pGHMgnehj9dGlagPq9YcLozERx3r3aZfJBIFAeiP/nq/7EKAYPyoEugGmdZokI5MTzMUZG1TV0jsHV

UcHjf1uBJmxw4mHNdI596KjBs1bCp7xGNtLpJWBbs2
1Gxw/VQXxsw2em1/hIFuQ7e+T4NQQwyBEfe9jZC4Bvjq77MwYPpEDrwNboEQ08x9C0WhHOpdNcFng8Uo

3XeoQJik4Hy9yuyWWJoylkomfsWFJUOUOCEXuSENJiJg4p7jAkNAJTs0e7uhmKBEsnp6dZdT5o/oFRV/

HPY0lU7jgdrghzpnTyNGCVmYN2S7DSjP40JYRI1KJJ
APnwX2qk1kt6axiWxZu1RH55xZCdJOxaxadk7AwpOlHv4D81AXRMOmZ0/5k8IhBI1njKe0pYnlD17lr9

/v1PrHedvDKra+sj+yAjrV9IhnLwUT7np3I8TAZM4s6lUhus/19EAFNLW+v444EBoP/+5OrO/mH0x+c4

IUSLJiR9pxIJ2YF3Pneyat+tVqy3QXX0tLrslrIRw8
Wz5Ss9mktWu95sbtYLlZ3MrhVPf6XTSSG6koWpF3QbUYILwKWGihGzV+ydZjt9T3VxyQIrampB0qRvHT

Zspm/b6yVnzYH+iHIXhVlx5oG7283EaUD9pJ5NYlpm4ulYclFGwLhXCOlGRb3eE5cR+Yt5I9+58171f2

wIRc3t+SzS7qSRTuqWMJKeD5nUpH50B+86Dgfq30FC
sKUjqQlu+J51NwnCNagsDQqlloRSJAcWs3N4ZJiawoVXlY9XTVjnD/iSlaSrjTCm3KCuLyGqUFwHWKz8

OM7wIXHa9VbepfamZJk6pZ9EP0URDRMpRHg65zRRNaZw0os0dE5RcqpSfJoA+1rPf96NAlqmRnS2CSN6

7MrfFFcD6FZuQQJj3dhwAC4p5KINdNRrbz734nztbI
VNQVXlwKH+CrshV6W+6yv9NYMmUY6w7jCkbCl+sP2u9hfVLJC64wiQ0ZRjx6WtmW61rv7rq9pdyUks9T

bMV55mxyPYGIVFOVZkU6hyZkCi7ccOVNL+cEnhP8B57xc+3V49gcMz+0qroe9WRege5LHP9eUm+pQ0nM

JtGQqI8AAwoXJsKXcq8dBbiJbp+k0lkMJEYm1MKgMj
mZ8vc6uEA303aevvNXGYc366r2jP7+eoV3qk+0aS6p9lEPkDwJ0EZTsELxZLZ6M7OkTWmqILzbaKeeHN

x+5MBuRMU81YpECOtnDqetJiDqw/R7L2E60OfAQ93ODh/wxVzZSiNFDBB7F0RkH3kB1pywshT2iPDgCH

BWPattsOMesyTxQV0GstO9V48HbrREdcsVOPCx0P0s
BxL2et6I8f0/To56kUs/u8+Kvq4VA2hklmS9Vzo2fEGfjBdk3OnLqwetBSu5jcsOcj7ziXpmRcdWoHK2

XbRFIFhdKTfi/2Mb1a0i+uQYi3svrO7wLKDJOqLhiH4FWaWIUOpRVf/emBiH8sptZHlo9h/mm34Xn7UA

JCcKHajJl6g4hCO9icwMP72UwCqAimKaN0XVlTdLnu
+4+8EOFfBOQf3Khgy/jDCPCUuw14Ip8BHFNrCBuVt8vwhz0U3WiUuen4slEQYou2I4jtTX49dAyz+E5p

L3jGfS32J/+YAmIMU2mdH6/Se6JqTAIOrZXD01TzuLcAZNmoOQlWOP+hH/zEaEFfvkmFvCo3NClQaOur

GO6sok1ianzvcN+RTv11ScnXZcDFQ1t/6ihp+wMb2L
6S24nFs9LTiXvscnIy+oeTp0gGO9xTX/TTxKXpmRFPgxz8UdF/QRXhyvUwt02+jN/wlv0moPYqeo8e60

58xhGbLtaaajS0k/Wlu8TfG+mbpR+NGvVAB38aPj1qSD+sNUy5qwV88sxPkwdKVmmxT1fQqzsshR08t6

Tol0VAHpMwD2wEYFJDhfN3Jpuv/MsAI/4VufcB1EbV
d+x/SskG6t1nfVu3jinHurFukazg++HpIizfQLqopx8xZMKCdU68QW3yOcwQhPXQPk7KsF+N6Y10weYq

gWrBPRU3yel1shc1+TvH5IVolkimTsTAiXgvwSe2vkKOvym5/Tm3BAgeZzEBmP4mButhxmkcDDMCTw+9

lK4TG+9ysy5nY05fSVfPuSGq+wHkzaV4y99aUtRns0
HGt3+6QYeVTsL+IuvpTMbJID2JlCe7GFzk9mvmANsGydiUQh8fu17/1nnqOXSJGhcFYbJ3eUXbIWcCCc

fUVUYNpmw0HVKqBU5ij1w2Jd2OCj3xdavDrp+zFi78CVOxJUNVwtrYzQ8SSA9+pjGNebN/+f6dnN083O

yQ68ViD3tFZp+TF4zzKcT8W8eo4rgNtPup6OvtltoF
tPvLQVs354yfrKeGp230DqSmaiOKe8iuUYxtVmjbIIbRhmjqZA6lPVYT/x89H31t4K73U3Tofvm1iixH

aICGzVea3+Sq2N1CzlDlzbWxvigm3n5X/8n9gPmrNh37fWyTq63nO3akxO05j+HgDYV+ubjA/6EZo105

QL6Eet5Ee1dibdesit/fg21++g2RcSAAzacbsM5vqO
sDhLf31h0BTsBfZNmbp59LXg/lo5elyAMfEDu91s0t63/W1RCEM1uXGObk5cmaANdNeVdG/uuIscTHl3

wdQYo8YqWYqqpSpRSrJMLg/4BshRoW/CHaMc1QKiMmXk1cn6J2HVKG4w/3K+LbUVv+2d7itc5rEs5+ek

n4F5VFcPtrWYxnkns+mlwXaCDxZ3LMkWW7uB3y/Ujg
kUCftjK20d2bojt22PZ8sPRCc+BsovtjYnwqrS3pzxabMUhQtpk4YdMq7xOYJnG08B9FNXNWCbWz8t6Q

ecRmp+AZxu+qbyORKHDH111cqo6ucB9Fw39kVOBl5+Y7JoT7KJU1BAbH75t1wplBAmOoS1cFZf8B21IB

lMl5Ucy4RKXjXhjGiI//TX8xGovot7DFyRQO0Lkbk1
9V+0Fx9AA5+nZNF41Ns0+i4y06hVNYj7YM+ulWc0xd9blpFhufaOCrB/vxpV+lHWPfBZX/tnDzk+8OLW

fxLMIGZkU0Yfwvr3RvP1OpSKiHmYmP0YBM3FMM/A9lnQAooD5WJwbpZLwQMZ0z4XmgT0qmZD0wLrTW5Y

8pQ5y7JQvPwncSFePBR6wMCJ0z6U/3kSxzbUT1m2i/
P8iFRQ5xmkTtyjK00ByUGZSxDw7n3C/K0TtLpWY0NnZqxnxv532rYEXKoy+L8FfbMsiubustgl4E+yZt

CJAh7b/lzDMwJsAFzl+UqHA7x5+S4co35Rg7ccSln6tK79UkOhUk+6vnRMmfUxoTbyPzHzadVLGM3BA+

4701CfV8gesi/tGxtJC3jQNT1xiBqhY85b/vaXLQH7
q5+XRURkdeqDjzqbE0qJTZdT34iup5UYtIC6d9kBUSAfKMTYYK8dOouvtDQ1YPHCDlgU7XEKjenQ/GbK

g73QHTfuVOtO4WSQtgUclJjiIySeFx7O0Dr/bJ9VKSvdN5glEUpEonnZ4Qh4kE/lKFYpYqwhLBz0JWIH

4vT83K9T9y8ZRCLlTnRhAfpd/6ksWBHw0kJvHdKPW6
D2GNtC1UlJgILUIRU4+eb5H062XCkO37xkxjuxv96/m/ye/umLh5vbRUFpqZGE2WfeR2SQhzgvv26aUE

t9hJPEqiXvhQztqAw+tXSboqs09BTJJnw6pBuODvWeMfgP5zk4RAUl0qD1BVG68z5C/0oOvz9Ykbi7ge

W/RI5U5AIESpnRD6hhDesAFUw3P75OYScxS3TgxtUe
SMFpetLRNcUIP/+3D1NK/CTZCPFwtPaO+AeCjl2GRM6j2q2r4nJpfUxl3nXgireB390Vma7jGtFiFehS

iIqizEIRJ5hVvnOpisugCLuf6dpA8qPnx2PL5DrR+5IraxJSXh737aeQtTnpdw2B263yiK1D8Dr6eD7F

NOezKrHPhWLDAJLT9kPxsPVVEXpeDKYfLW4Aat76e+
eZ579zX0V6ASeNPYIO1SoJ7q5rQrBERUsv3ewKqE/yHvA5T0si8NA2zWhccOYyHz9oGVmJXC8icdV3Fn

6ILyhD4RUCy/aeSDw2GEJ6Lzq7tcn+7hH95dNKY5ybsHoVWugyXgGvM9tM3kaOQQJs46PS0Mgz3clP9J

34DDfReFcGkYBCkWjatBxIubhdnk5Y/ElpzSGRf9SS
5stD5RIgz3FQBKNRG7pEHD7URsnJyx3ewM+ljyJO77FHmde0fw7pVdtez3eQh8vV30opm6smoYItpHbj

3gx/7k0SCcfqMZWdK9ZmtP6y3jVrWOmdydd2YyZYNN/o2MUBQyXQN5JT6/1qNcU/385SrmEn6cQwPKN/

hV/+pUVqvgL1PS9U35N1hqXeDB6ZyWCTllhjgIukJs
/3Nkzv2s7m2+YxhigMzIS2oHD2ktjdhq1r5l5XMM4z0T7Mw2QwnNuyPxY0OIm1sdYKzF7Av+M33Ge2H4

MnHGoutSbGW5iX5KzDUiVOqerpcvL1q/dS5E52aVEaYc/NHiyVK/ma1NHiAVTCrGXKEmgg46l/S9bLzo

T+UWbOodLY+DkCNGc+dxz8smP3RBUP8qjGpmUz40el
pBwu3NAuTKjt+/r3IRPp+fMmENUBXiMhjkynVk56lZU2KeZLMvFmWbsoho2g5grpoOW1l3UPlWtJtsTf

pswmzLKpRrstVJ/7cimuYS3TTMcLTDFX1OJgf+/oYr3TaL26BwGT9HvSw+oDZ1xWcP1b3LRUBaGLS+

gYUrrz/wKYEbA8rSDNp/hf9mNcpuhjyslJHpRh3zqW
Dn3+PnZzEXH+LA9BnDJjnx8i2VG+cvj7SkaLEMbPTIEIMOFy+DOeRCVDkNiw3g7DI3VsKVTO98f0A1HL

CKZ8bBq5bHAGzprKLAJWJCZnYDItlF/t5abPX5FXsbO2fhjomw/Al0p5ifuaVNV0ZAzE93xV74sr4PZg

djKFqfTEDosfYsfrO3lPnx2EpC1ymD2Lm53VScPk72
oorln/IO745KrY/e/Kfn1h0iQmyp7gGPxJ1HtAnMrCcmGKAIS1QXr4X+GAwpTJJjIXcEP5j7336Mlqrs

T7YkXW3a/+qswPrPxhPTMrAPEt29mi9qzMPZcKeCTMU2b2uoR0r8mlvkAD0IwwRN/2dmKuk7pWJO2Hme

VVlhdr+xWs1uAdA7B4/ckADNGIimpl9hq57O1kE1rO
Z0fQa9hFInuIN930Kyl4FGcJEsSj0Sy90JHmLcBe1Aq2iPRc7QiQS4AFlbpVOwgt+vDu1g5bjTo/+cOq

vrRfyKZPzULVixUMIkL29pvp7KztdgY46aV1ns0AQGL7Y0vFb3XfkRk9hdH1fhu0L1zHr7A6AvVOmoii

tmNDva+8EEc6rvLWVqNczqLAjDrYYfClhAF18ITO7o
hZS2EUwBfL7t8eR3BiTeySBHeZA4gf4gpjleBfP2+wantswA3N3qDVuL4hBPe10CBZDKCOaPrDqpoKom

mc9Ko9r4rV3LNCysCr3xvMedxQImDOmRvL6Hg/Y83p+4G/Q6dT8Z+PUt69xgtiuc3vCMZwnB9LhliPnx

Js6hnFHvj7BjdPr+ykyzg3KL21mplwE1K3KqbbtqPr
ShptUUyCGabQNlm8r0t2nLY5/VoQW5ef9Umg7Ok/r0bbdDHjOFIIvI1FQZl+kD5CkkcWubG9PM4rKzZN

yAHGx8GAL/brQnsvNEjWnWryikPF3hf168shlitkTOigA4Xxsb/Nn+93OfPd7HxvmRxoknOsAqxfabby

QwmidvqhvboDHliEdCo28SFakhxeJlz8PAC/H+Vh5B
cKBD14lxXVteezifWdB7RHqSlFgn93WMd46w2rMnaxwmCsmdq78a2b2Qasp8N0X90KaNqahZMi1TZBbD

U1Sz+/23uewKbA4pdVj4ot+vsSJdQq1YI0BpOeAa9+2pNOy90b667TkzrK6PKgdlrSE5qhLp1tUun6zB

kLQLehKQmJqzihAuOAmLpLns49vVjs2DtWwrT22AHD
CfRtlNACfHeGtnAGrig+9SpRNln4yNVYdmI0Bbl9ca27jsKoS/4cu5jrxf9lC2E/VLE9T9Ui6YKkd/I/

tyKcTVDGDLWLTHE1kMDWe/VI8yPU9pD8KCW3brzCCfKGz+HTEpiG+vXvcBCLYTEpFv6Zy6U4nsztMR8g

+OG5e1yKTkFqnwzQrrUeLsUQSKVjsenbF/ORgp+UR7
NteHrbFP8kkY0bDxycFm3oHkFQiE8C4IAudvJ836iT/jJm1/1Jp6yWFjtjTbq0sH8fm3cbOUijuVfE1F

7m7V7U1dw7zKCTVIvuCy8gTGlA8VYFn3ywbp4VR2J/iv1HzPAp+pV630W9O3+sDY5lnfkktRan0x/3XW

5QNXJyxUVBsA0aCIhk3QvUj1LaD1ahFrIBflDYQGpq
GfMzt4GkuVPRlGgY2RffkTkkYbeNIDtgfMLp1oPhpcEyuWI31wEVrFybSlLeM7hGGVd4UjtCi/H7+1Eh

BvH8grnlQGWvNvpxXoVvWZTQ2hehxrC6s33zCh2o44DE5zZIV12H/ftgYiF6eFUE+Fuz+VMRPd9BnFav

A5FcBwcB8i/8MvCBOs08lsgHWBti+ytRPvOJxFnmvK
GHh9aeoRry4u1YqxvUI4bwVgXZVSX1nna5baGBXHn89RQjYcqzm8S5sbR63sCYTgUtHpy00WQPOJhZg7

8KTDATcRDGS06+fe3RuPi9y8sE5jyD+2Cez+ljJQFFtPliOyclC3ULCsqdxDaccOshRY70rFtKz0VJeZ

GuV1R/mJ4mzP/+ZRmMHK1h5FzCmcdD/IlatGfU0M6N
y1nGdN8zVK05h2KYeQDqRJFm0BRD4vit1c/LwCA8kWDeC22Ij53WJjy7J/VUFbnJdWODLLJ/lqKsGchD

nDxIZcPoekrSh38Xw+0lUfekmwfIzhtkbR6MyUH4I+BUfhimdb43HxaKxz4bhTiZkEX9ydKyU98+/aFj

bYTbK2/A0hyNurVeQyql7eOV/QQ8R4H1fkpfxHmU6Z
XD8goKw0v/c2jkXn3ZNtZGPeDfMc/O7IQe1GS0dirgzccCh20l7WkZTjDcLrf9LcIItUir0zmpD0zmsA

uKMf9WS4D91Lcgx8oQcy6hZWbVBDwLhKmF4K97BXs0/qf0UBVhatHsEREcid5y2SXawf6Kju/R8ld/h5

ATmnXbnyCcP2Mfeji4wBdO4pI5Uz6UaEj3mbK5zoeI
4OaVB34kAjUXCmasJlg7RnOcDqSGN5DNpQXsYrG6c4aeGNlDiBl8nzuQfz9s6Ablz84Z+3UP3/sdU1Fl

DvdLE/qYB004qH0dN0+mfql6Vt/kOpASNS95TzUeB+n2bPSDs1zhR6IT7XbI9yW/mjrLL9YG1dBWyo5u

dfxCTLMwz1u2p7COZ3Z7jvEXhTfcl2hZP+ehlS7ODQ
kZFEdpsER6wsRCoXNcurMaQ8PvRCHuVSg3al1eVkoKVukS3i3FkDvLgaDu2bZ5SunkBgH286qRwG5zEL

n+2VhXe4HkuJrpj2LNRPWNj5lR7QJbginfcYWsDekcwEjX14SOPWxcx/wZy04F3fScrPn4tdX31NMx51

5JHjd/SIEoBbKYgAp76IyjHSpkW6SUxNCgyBSDhn2V
2ZPYk1JaUF+Jp51U5vuv/WVlFQA801dfzwX535ONJxy4tzQ1FzlHxw91fl0uD+4Z4ERhz7TRkQFj9GBu

OJZh7p3/xm0hupnw96aaeMO1WeHT9jpP1Y9N4cBqjOC1C4Cj2EbshdPH/kZ28JLC1Eudb//iX754ltF5

k6XQvVVMlse9pknBxKjoxG+q136Yqtg81AZxITFAB0
GQb7zY9y1zG+CCDbiNV34yYF2as45HUJQu0XKCdp57qR69kjZ6lcuYiaxzmkb9L5X/2BpC7qU28hDsQj

CPGLTEVtK7kYdqELKmldbQdaCmU38DaO9v1+Vohz6g8zAk4vvIVQxpiihrU5Z3Zu0Ub1eEk03Gjj8zQk

+dxqDMa38bjCaU+CV+jKBp9FSUFZWH53czROJT+e+P
2TLvBWQM8K1TxPy9997Lr1vjRz0vCYQT0YUBxJB2oFRj6nizHrn6prlsvIUEqzG4Wuid98vaP+zGB3za

SEKjPX03zi2kZZ/l1NN83C6KIpSHpUfFVhzF0/oenNL95gVLCwgIRew1f3Y8jfeUuYcVfEP3Fuq+rrf7

WN0WPfTLznWVyoEcG2LSzo1iaVPxgHcs5jm+y2sEcf
LBrbuvJr2KhEZBOOBdPu9VPXEZqu+ZMdMF29O1xei9XQiDsqJw0uIxevcnbLvAcmiwfggks6tB4yZFYS

BfsmlqvoFHXRxvi+hZKyoYozRSDGC2HGsBsJ4mcNLSQupwl2BOSVWHd3HNwzz9mvv2v1ARIvj0JCNG4p

IqCI5Sf8vD2gCG7u400sAGr3j9w99EWVJZcH8w/vc9
td/d57JqbT6J6Q19i/6kDyZNRt/Fcf0WVPsvGRnhTb3nvmzcIGHuQIBSgqiJpEneXbx+D9H+s7Q3OX86

DsPjvJWBHyPPFmT28jbcQeEOBDMbblQZZYzwUATCSYRzNquDhYzuRbRrAmYnxtAQI99u4m1ft8pfE8+5

w6t+fJc256CpC8IW+/612rn+1h6X3ptkKifSjUdUtX
JYmm1ZRzCe861kcfAj39lXwr6g8Q4IJwSgOHT0smslLdez/CnNO9RNE14VYBa0kRjCDMriRjzG0WP1ia

q2/Wt7FCxh86r4uqxo0qNOqYrY6zQgsnCPX9xREE8WyiycxRkJXwdh+XqrcImqSP42EmF/A7nxpIkpDH

InMtcU7ySbuob/qj0ZFc4lZvEYSJeysc3ZhE9mA1ab
rqsI4ovz4QronbbuG3sWHShL4Lz3m/+OMicKspA8S3WK81hzPQGAAFRND55MYBJs6JP4SPG1D2S77FIx

HWh8Qk+noPmKq1ZbAfGTFFiukArl28JmZuy/q/hknJIjyG8OoEFFKvrzfubCogErCj8lGdv5IgNB3vnA

wOHoaD01MN4+Mwy19go9ntrAIWd/1+zl0wFLAm810E
J0I4btwZTIeVNucBZaAMBxGNq1g/ffmc+AhgGrJ73H1fNVdhhFvkDfBts/lnCB2GkDkQi9ITU8sc5L82

OHTH8I9KeuZaPbKZbPh+uPskE+gyQ7/W2bpg7/olMIQ3Jmrt2b6oP4Fb7na3Mj1u05haJAWMYdxfZrgN

RjyldM/QhniH9hifer5H+gsy3PCVUrAqHYYgNyHFiO
mSyyGs0XHDTlUbpAEZDv1iczhS383uaQWGL6vzU8YJJ/19LLhdVjGlBkv2y0hLUUsNP5Qemm7hIMThDZ

q1b0d6wwQ6v0UCSgqKJD6koag48yhR/YBNRjzY3/jLBSTgTA/9/zMyzq4DfcdiDoMzokEm0eHbBpLHlP

AFAZrqPUepLA3neljiX+11opGVlnoKDl9imRxEZB04
CU4Oa8zTu6z3X896k/oYrv3K4vaoJj9I+zBazzxjlOCF7Gbof9LlDkLwIPVwgjQuKFXbGfkpgI+hAHFG

QdcHFX4zqS1PDkVd17hx36UWpWAuZOHlrV85biLcNB5eoGhbT139x3BZs9Pg8kx3CYfYe8dxamW/7HSC

rcyJi8QuQLnQL59895+TNuq+kgJ1fidvu0KmVUsriG
1cgbdHDAQwpfSbP/IODk6f8D/x4AkhnkFrWXueeeH5YYRkXtEarQwEsHo3/Ij5/xQAoB8hxjOwHXdmNb

Zv7iOo6lmyBjsnd31Eg1vit8Dxdi6mDyRBp7SnxzQTQX8HnTUrorMI2+d880THhGXUQpv+/nBBlR3ZEC

lCRfRqVYgV/LqNP/Rpg9IctVsPZf/9vxpXS9zfXlbM
kJcLyksLpKUbDz78TfXQPmIIqxHqzlm7da4o/H8M9G7HH75idZW4hnu6XCm2Vw2ZdY/Po2cORv3vp2NB

djYTr6OUzvRAZpmDCTTEzHvv1RwgV5NGX05WRBeTByHEoFb0ZuG4iKNuCa6/Vst8Uf3rOMGNAxtxem+G

4QLGNvTS7Xun5lbMNOs5Nd283XG6wX832viVLi6//s
EVGkPJWcT0TDeqxBraCDldT4qKDMzxlUz54i85jhZEpMiq+qGj9UE6+L0XrQt0hfunG+OVJWt7mGtjEg

6vaPq0YKhA+Njm1nxrMDGSysNJ3obYE9t2DOtTCAE7S1kD3JE+NeXWz+KH44UEyJssNrF8xNgJWEwwbA

LjuesyzUOSbX8ECk5v6BRsi/rmZqYuoUOl8sVvv8s8
LpUAL0MUuINA+j9G10fo/I5kKdIoiI83J18jJKGg8hPBLfatMM3FbAeaezvkVhH9E8utUcdXP218UGBe

Lv31cMz06UNu/za1mKghdUKGTm8gEYnnTUohEyEqXic8u9z57ARlCKEm7ETllMxrK+1J8/gr/jHrRXQL

7bTS0j4ZpVMGmp8vPYv7kCQCjlo9cn0uorW+xFSw/4
rg5ZUBVOXa4f/O0/5djVnTQW7iMKlisNGvYj4w7jwHyGpKXlfaaQbXQuIwJPCFnpbDEXnaZ+rXQjwg5o

6zJ/CS5mVA8hHRPlGtDfHuE/J+2tCDoMeo1pAJgd1A0LN/NPPj/zA//d8D/xtas/bGN19b0WGqrfI71T

43G4oY8qYc0KO9pPZsUPBOd3Y2husoHDhQXJHE29U3
uc8hJaOceZ8tRaY+BRyLU3LPe2bapdzLFbX0vZCjK0Thwkkrwmkhv80JwWOSlFFMsn+g5l6XZIAfQnfr

H8oZsQwdG8+cJK4bVeLpFxvfwR8jbyPqN1ND7N4ngr11EBuh1We3tddcH/1lyzCRWtED+aXYdPSROaXt

0nW1sCdMUJM6gt7bkdzP4Fa56udcfNA/OboVVPaPbZ
Ov/NQF86KIGBaHcklS5L3w91GiwNw9TTejaEfum1rvnE0pPiFpfsHKR97TwTTm9sGlFgFXqJqvgDbiXN

nTii5ux+TpmhkKj0cO+HKS2x0/o/G8r/pO0C+D8tRAiVVSJwHyPSaNj/oshOllu5GsE9CCM6bGusubaI

CAFZA9w0GdJogzTvyhhh2rJ30aSDNtDc0aKG4fwAX2
5XzJk3HhDywu0lzC/6ipWubwyX9FD2b4tMkyByYr/BIYilJk0kLP9dR99FWGf1URPsdi83RRU1CFQpEP

iRFaErmA0w35Rvj17599bOKlL6gDgO7v2FfYWH9gxSp8WoSPvVRZ2wggNblqh7e3P58KX2Qf8EzoweVU

yG65DYPpi+tnrz1quwq0qcz+tePfg7WMWRVi434656
DwpHfJrdsLDDi5djKvloEAn2Uw3XYdMAIfI6gUz3QIEwTmslmGPvBI9oQJI3M/U+6DRuj3QVGT99DRPO

A0lUrhaSemMCtvnWWUrWAFGCEtZENDtU0pceTtR+XR5uXRp55EOoko0duPvBxOTzh52bmfFuKMTVckVa

ndhY7H6cfR/lOqn2+loDS1xX1XERpZb99vLrUg9K8l
RM3YLuIWLrynRcZSCJBmq46s3v/MIqruehAvSzxyLIJLS1z8E5odCdtasB/pfua+XWqRU+j0VTTQG9tQ

xfoimtlrejc9jT1PQpv/tyRWOhmX+UtDZ69bE8SM8L2tny7dOQ9tghPzraZXF1Q2QjQt2mDaSNEzSODd

zqkweKRiogFJkyxP4iHF0j0Uazt81rqkyb+lTUT+im
uBPpFFyo5xPrB1yWpCmLop5c2lv86RMWR7S9uMdQQJAe2FNHgOoUV7mvgx1mpYDwqaL85wEmsxNboouW

EbPRHF/ZLrIr5ftB7loZGIbtgV++vXD+zo9MtWWzBntOvsD7bNmtvzgn6iL4J/bT6YjzqfcSOxnFCAOO

lWz5XVWY6oKT04YQbmT6cDTZ+UdCiSZIqY8a2K/sUx
v8MtwvB8tzqdBx5/Pa0+WC1Oy4D8xqpxouGT4CUr+Daz3oIVsWOeatj1+u2L/+Np/E3WBF97RZGcPfQS

M3Z3j+bw27XpjJKvq8jpLylhzpeClZau33JGu+2rn7veGWHph7PcZjDfW2+vnoLslUzCa3q4dfK5eWOj

bv1xBHrKkHcUHtt+dEXp+lV0MsqQVh9hlPES5UPorE
BAEtGvB8Yx2Bj4xX6ZmmxtJPlWA/NJYfmmhG/0lguJ4152f/SwsyssBsnyvVPJOg3CMn4A6dEPlhgeAs

ZUnf6h4N39UdKwG4AC/G8nA1JStRteEDjl7Z3YoQztggDJXSbpsKL7qiTtE7hly39fCV0Fc9oH55Xqk3

y4cMZMg9yAuRRJ9kpObhazZ/rE9IPq6JeUL9IEFo1p
E0bb6oR7FlpyQy1p9Gm0JM1TBCK8h7Z60EKi61jxgr7nrKMKz3R4sIqwqVnOBytuFphQEKgMQYfMM8rH

aXVCnjiJkpvfTt32z3zZYNCh1t67mvP6eKSM4pa4SaLojZD4W3MN64fqN7Luo09fjBS1hi4EBozoSZ2N

XxtpLPhO7oyuaryOvksrgY6otnWO8IeBJx6dB8VyJw
bJSQvp8GuFQ3v2H0255ZK3Vfm+4P81us8U42hzNpu/OwIV9pVFXxBo6OoN0DC7xvCQ30xV95AD3+VOLh

vrzfbOJgbi3/7UdVJ0B7xEDSQHrl3Fb/oQS1BX3aAt6VmamNc5IBwNmkUyu2aISyZY3ehRT6hxJITBZy

GQpQcgmASLaK50ZvdhqU/ue+a3E2wc7AGGcTbsvvZe
sYDYQSkz4mGGFAOhVMdd+Fo04hwz6gpozYcRhPnYNwiM88uru1bOX5VgL4eDFhMLrO2rnxUQ9SrENpFG

16Q2sJn80gmMR927WN8tXUbm8Ip3jq0FMnWEN+Ks67oBr8SIPZQ7ActyYZk2yOGyCjJCr4bT5ccdXC0s

WkwP39ujRX0sNoCl/YX5dYpzJWD72zV9HtK10fI2wN
eDbCVmlc2FLpeNC87+/X2XlfJDBspxxHufocrCkcfaNm7AkspmedXR30boEE4tUJ85i+uIS33gGr1oU8

ApKjM8tecbL2OY92EjXurclm391Abj7JH/hROzXmsUtoaGOHNX45WGWKsDJMWVChkXdbGny8aElLRLkd

AJB3pc5BZTjMcVwXzU3xYRzpqxHLBjm0GgGNH3ZS3y
D21qYO+FFLqY28kp+d3lvTjAjuoycZAJAvxDVb5X3HHhNMnxxfH7YDbl8fBAdIcGVpJArbLeL8apWbVV

EnZ05qtuR7+EczrPe8M4AhJwH+UpO8PptOWX0gouVYzkgXq66OFBfSJtTD5/QgRDltG+cqlDsjFjy+Ov

3h/ildBXnBXUYyqfR7Ca91zs18cHl72tj7K+nC35UG
s63YfWiKcI6cJxjAC4fjrN5VGdg2ipl09WVmSbZvbdwQrfkloypPVvMcesru5TYwFmkveLOYkLfHfW5o

11htS4KJ+0+c/5v4QYv3eXaYXMtUbBrABYO4sRMq3Nm/0jGVJyHgYbiD3aap5xcVwn7tCTfnkfnMLa00

Hr+XPf5TB+GJo4sVkJgseHdn3WAi3kTGECM49HqPJt
2x1qVvwzf4m9rwjUYNXRshHCXgBRymM0WK/G7WDebHiDmMwfvL6yXi5PO6skBHvsU+8B/2wfQJ34H+mi

/AKQy9G9p95WfXiywsq31SIjg6CMglKcD7BAV5VNc5zSv+XTqktp9f0OoGd4WqforTphKnulMtgaG3t1

gGFW+Pqt31/vczdj1/1ktRevrtgGG6913SkkePL2tc
hoGwxdigD7wmrNTq0z2En1eif5CJz20h0UFgPcLIZaTS13eoVsuUkSwzTdzhYFXBTlJlBodw6KtQJ81q

7r4onBjBNN1SwjAUil0F+izf5iflduYu24oE8fg9/6E+NJ7ptli3Pi0dh/1Jr9TV2FWLEjauy6QPaq4Y

r2Ys3t1UY80injgVDF7dFOQGUikI/3bZPx4T7uU9c0
sA6uiCMUGR69r/x57xY8Kl2x4qxj8CBTjt0onkRCZ6l8ZEp00Jr0KuzRc3Q/GiqgzRiEI235bECETDA5

KZXKoCFOJwC0H8A2CveCixGsq4T6b9ExBUWBb29ElHcAk5pDbn+kziROcQ1RiNdB7YLA16Peji6LjHrE

u9zSRN7kqNqIQyFfKPaTgflUmR7YQNgN0DGG4ZIn0q
hpIn8454/maxriXAqNF6rN5zpJyUkFlIUtRUdMNfzTcVy1ICAsfcefXzyJPBH9QOPdQNQziyKOhbzkio

tyJN9T0cy/r6n0/4jbGzqUxyLpz6EHy8fTFsqDHjDywPmaVnWpOQVGpgAukB9U1bBxcrT6bfpzFjOcxV

Lk7rZUjUonEelFmuzMRJ0dvS3L5186yojo9MKtA4eV
h26PRNDDGdJkjg0vw0IpjV+fa/aWmWWD+fGiKD3bz79YAk4LSWsL085KIpkcdbFFw2XPrH4pHfezTQfY

wwcN9RJ1nbJSQ695vzlwaYOf6+ImU1u8SNivLa2v4OwpxcxOf3+yf0PTltDbbXT2qHHvRTXtMCxWs5/1

a5CWXqLKe+RUfbQJzX2XuGNy9skLcwXWD0sis+n8lF
FBgddEL10K/OrlrDuzau/z3mxa4jnQiyAAmQToHbFHcFGOPLJODZ087vz3yaJLDCXxC2g7hnCFvvSkm8

ZNikWgExz+lyLRSpvzuNYN7IdZ4I7ydiszUN31e0Ja2jOG4u+CeO4/F4Rp1rPadDlGeibu7tetTcB0R3

J192bqdOT4Ln8ORJ87fpGupIxGwd7lFUTDIc0gPSrr
YxUun2wBRQholwtdaxBW+APT1zSFv52IzmiZZxs3wsAspuN0X0fNUPnNhIxLlGD5DrVtqoGU5dVm6fw2

dtJPG/RTFCiY753BJyw4ZsFzES6rkO8GxVtwpO6qF7U6zaZZn3bIw6rRVLkf6szAwcd4/WovfoypMVcU

P0xbJfUuwXVPFTrHn2VjrPtw/ds5vwrLgPA59WW8jV
U18hkdm6ewO/14CPYgK68AFpeQiIkvlD+Be5vIonwVLyK0uU1Gq7WEyA6CrtcpryxK794BALSz0Y1gWf

Rs7DL3itoVCrv3/H0UjIQxS92mxW8pixiGTa/RiZif891YYbiDpVwyuxGkTtb++o1Rt061p0QGcKRN0G

xF/4s6S3hrzn4HaD+OiceEv24PSm2yhqCZJTHWRcZ/
+/u9ndfcP+5alQkzJRNTfoeIb1QYEKhphc/Z123DNODhaIHWvCq9/i5WrnX+XzDS6S0xbY04sjitbH23

87bDHwvG1i6mtS8cvSHXadlZTkjWPjMMqS3sZortNz7xSjzZ5gRMfLuatDuQFMJjDuIHxnF8xSK14zc7

+x0GTG0M/4X/HOHYqfCVTgIJwQM0xoruXBbDkHyM/O
AHnC5bggYFX3fdMnMl5EBedn4HrrjlCcFh/ZbNxZS88uwDsCRXoummQw1yxiYKEg+2v9vyZ9O0M9D3VL

1p20c9e6L174/vZ1rvATBgltznUbG1CYrVdOUuJ+y/gLmSVoth8hWiHfuK3afF4hJwoye7wCDWq/x3M2

HACq+H8IS5TLJsg7rRmexVdsZK5hMSM4OAOeRJuxX9
jww+d3kze4HDl/dezouQqH52XhvMqrekkBphgf74qdTJ0s/CG8qSFU8OIqsVE9zTF+iO5EeWIyzlI/aB

gPXVXtl/FFyV9oWiyKDP3lX4a5ZunM6A0ROb2trAQ8+ImUw62L/5Oq2f+keDiIWz7kUE9lph+T3ebar7

BfFNoMnUVtxxtkY7enRKWaONO9NcP6MhBhGo/f5h+d
KQ8MT+uoJFVQGWbZSeJHnsSYXKow8BTSRjPMm21NWtXZDO5hjJ1XxS6SNkQLkyly1tJq7/TpdTbp2wji

fSbB5vg5xs1OJCi4DytbJPqCfsXik/ekDJ+mHUBQ+foQSuKAT9BGqh1FabAWN27Jyq7iv3yHCh9WDLaX

Rvtcg4krZ50zHxY0TEL125x6c3T2uXUEzgBu3LjP0A
zed2Bmtl4m3qu6X/Ym6WcT5uPjO0ccvjXT91yRM59gaWH/B89SA3p7VXn9L5zkYa8rgCQP5JOruOZ4/d

nDMFOFomCBx7D7Cxa6R3ArKbbUz/rZM5ZfqaVV2qW17igvVLVYuCXNDR4iiA+V/5dtb7no3hi4Z63hUL

4oeL/VbESSFDxQDbaRXXKxbxKX4/T8Gfde7dzvp9ty
m9oeMaHpe1LMgP6h6IzIKuV0fOE49hDm6ZunzZUFibwa0vinpNlHGJYu7pXqObmnzeAa/eyZ9aRVLxGj

B8w564l++XcwrN9hxzd+pzhSAznxPeQQzX1a850kSiVfpHiLaxUL0jtcTlfInSXQxlx/5vep19iho+K9

v4pT4TGCb7Xj4YJ2+oIRE4nzn9G99+eMhoWxQYdUeN
vtxeGF0OV5Lr0vDSxVaFxRVJAEMoxYB0Qt9NrcpDmpxQ+bYxTRP1JXyOTsg5tS6C0BtUHQ+t8Bb7Coqb

J8utp5uv9nvamPLi2/w/hVvaAOKUowxTykVTvnPgEqaMsZ2ggjA6WRE3xupuSG5Ix+zjXrWMjq7Nb7cF

xMjbVprjGtln+DnWhGXxvPGaM3ZJo22i+F2y17guRQ
qXL4oPdtQc6xYaNuoNqdcah20EhSLcbniBHXlN9CQOLSA9FiZ5lPg2WeTLC04R1+aIgQVFBu2GHvsZW3

0zycUPrQ5q4/kd6Faim19WCaJhqh1n1pTlwohYHLOgq/obrmxbm6FOaDA1DO1/VEhOOXH6xoTYrFHYAa

JNcMSMlROXLzw1IPzzX50UPef9rgH/1/lpQLjvpPoC
Po3sp4uT5ZwIyfIfJIAztANOJl5B63RH0iC7Y5h2/Ld5UH7/Pg/F9SvXMgRkUolIMoD8Ln50h1sA/9tP

roPHtO9VuNgVL2KphnBTW1alGbdGgM+ZBgf36ySgVZPw0o/H45NWwxxfgE2R9hObZoNtk7Zuj6XuYXoI

zEZuTF7yhTgiktOydsvB5i7ve+PVUeZEWyiycNFboF
oj+xBvrLqj7gsxS8QDdPowR2N8U66RNQ5aZUsiHdoc6mdhZ9W6QeJD8K3n/vQ2bZCBxtR4/SSTLwpYXc

2lJ1tjzUSmGo+fA6eO0lCPhSavaDsIyTlSLkpYTRMTyiklLYE6CU5L55mQ6Ap8b0H7Yc8g1fux6s58VJ

tIo3xqZb99k11kSjR0UOzOWNkB4apd2jein5ACqPZG
bRkLzEp1Cnym6wJ8bcg+ywPX0mUOjXhK1GEuhBuRwqJAm4pR7TapnnhHVeWCNnYMN6uaWCo5TkHQzxaC

Yh9IzfVug1B/26Gz/BLqffENP9wodaffMUjJxrwxCdS2g0EIxP7IRZHxVYuGON7i6rHIL33jtT9GWA5Y

Cl9ajE8GupvV4OPYEUfKGlNLXrkugKiR8hnzAQw8sP
+WMNnJHaRk0BahuaMI+8jnmbKZj7XdKiSbhHNbz5kLQCl1RIDnbp09MUTfIDTrM0gFMr9f3hrNgAXOGI

7lBdhYc+1ntrQ7x2NF19omjAXt2Znd8TAtZzpi5QzS5z7bvySvcljNQtRVDkUKxVpeqCq9XzAgAIBD8l

f9ZUQ3kESl+UglcX0hOHr8dD/K/QwSjezKCBOw9Y6k
gKalOZs3WDLkrtcMJHKuteycR2yBqVFdAUpeU2BLEbOoaSSyAdsWEF16YwjXVS+718JWG47tNL5sEamC

mOX8puSSzzOIFFeSkBk/ORHXmUPUXOqcXp2JF1n61gncdrcXxsuP5zLM5NQWL3R1V3QLx+o5RaOvEPrI

kv+1dSIaIshwzNF5gL7js3cwIiOAqocmkbbcmsTt7D
MgHs4dpuV5U6C6q08WnjiE07GrPfFbzgAn61DsYDOwbsAaM/kZ2t+74pg75qNnDGcDcGjSqFu8tlsz09

SSLqJ6U2Sn5hI/wpl0Fi8D8RMNazKB6Gf+d0ns27OyqHL64LoPn84AvtN9KweUsZC8X+g7ypxHpuyRef

VJkrAybk2sMcsYfkuuib8qh0ly44CNV4UOWjTo2yGv
JpUr1UWcaGgfqbLyrw0H6wUisreINR9hwWRAvdVBnPMuMN2u/3HaN5JTFvsaR+uzGSKgBEhU/5cSRFi6

2Tfros4ZlCxkCHsq8Qmo4pU0h60x8GTlzpIfPiDbQOQe8bT4GSs2M/WNth6OPxEla/yys80Hyc2aqGu1

XaFOg/z//j1BluumUUIBD8bexjXDuhN+FHO4Svlh
UEm1XcI/R/VuyofBCuOkofDMdrdX8QZN+8rhH+KbUpX+7nSICtYzo4McgUBZvVSS+2UOoyp4U1dbpIQQ

ItVBJekThX4AHah8q9GY7ukHTZnnLXoSbRCZgIgP6H+dEVyU63b8rgypvrNBYN5a7hOd0PAzN3LOm2Yc

2lST0sX+J5NUr4Li6OfPdGismj3QOs19S6duvKEV9W
ZjRdedaGvnwehElAHnz2iVuj9E55Ml312fzkMFvTOmNcfiVpbpK9k5ciEOqYj0eJEH98wGH2lANFCqQv

JHzWI/NGqh1d+Ze87MVlyqu2wWbP+5JSNYIsw9zx5JQUvXY65Vj+3BirEg2cKm7dMNv/6ZTmqwHBsc+5

rY0d3PwIRkK6IFK38/SkEYe75SliEG6rkt7p+p6+hG
924R0nxPlVzjxxVxJGj+RqmqgRZpHVPpWDjRJsyyLLqF5uclwpr6IztuKgH2/1wuFDksNXdcXZkp7RIw

3iLVcGkv1U2PN1E2Epb4eXC6lEnUoe+6oL45pyjs42dv+Cwwe9NmlOUfj7AIZ/r6sNNS8am7559+3g7Z

oGSNjOX5FP9aWeDoVIuoRG/SiwqWaz39tBl3d3ANMu
S2/ZhCLdVpYnXdmdiJeuKQo/XaQbPfXTGpzPxuYPSB3d3W5D0nhGhaNRXFI+ypO6QBKOWTghGVzy69w5

xd+tnp3Zj0yKVQ4C8Mm2TFfl8hxks9CXAbkuGfDHxmSE9cqSl9KoDrGwJ4Zlvr+oACXZtuQ3s0puBEn4

RDsu4HS9sSlnvrqX0mCozQSjeSoqTleLxUjTdYs5hm
yuNByAZK6zyvgk3FgjJMYZqFAM8cLCJ2UruW7eIw3kjN2o+pBeMcCWPxC+V9Zr7itiy/u7mSdgVAKCx2

8BkOpYry0+F0Bv91yRC8c0p6WpHRpfCAZTbD3v0SpGocP+26WdqKuET+y1J8saUK/jMoRA0+MCr2wfJO

AxHx4i/b1NOPJxj5+ZV/Fh7QSUrdJtivDpQ12uHRgc
ICv/WpZWFGpVuZfzygg9Dvok2O2XSWsyhFIwMh3B97Q0kdsN9SSUWMXoiCTrMfdsDP9ixY+zpuc1MK+I

JAIH9+Bl56CLZlZqNgN3Y/TAASv4uuDHbO78ig6b6Gy7NlxhBXrh1JVwE/EmePwPI6VcUZBFdm9S8ro8

hmuK1K6ga218HK9UVb0mih/DM74YCf5v7okr/69Gu2
hkyGHz5zLh3K0Zc4m+D7nPlVUXUiolFiHx4VVXhKT2QK/31Ny96+EC0i1uP5xd8ksEDPan60dXWnxxFu

Pos1a51ec2eiEbQmzk1cNo7FMphfszm5TzJPpXBy29DcIVcXLPG/fcUTfIBPaljnx3Yd8cHLw5V+vFVR

h86r767/uA6QpdoBuYtJ5PUB6GSZ0q7DeO/uGZUIlR
AJJL1aPi51BIO4wG+M/E0Zcyi0Su/PRBy26UPu1MnYA1Gvf3qPM7ObrzxwERRjItWN8i09Xjia7h2q63

9KcM9bkCnhzV5h4zmWnZzjM2Ylc6mh6P8bbeQ5eg0gN22ETlbUoYQ+XfybRymr5hP6JYTk0tWcc+lBca

sGKSyF4BQkk8fZc9r7rb9UpI1i42rgdi18/Gn8AXqm
HBvtxenHlQl3o/D3YM6gv4eXG4T4euDbe5apEO/HMnMj6/NLsLmzGpYN2kc9QXjjc54vYzhSxpU6Fx7x

7ZKC27e/JQgyKtR0pTHRwOyUvpN5FCCoE546uDTixd6GluY+spus3TVLh2lrj2wX48jpmyulswALn0XA

4Vv9B8Pwyg74tvw6QM3EBznQyZ3upr6ixLQcQFkc4w
a4d6+O6RbLfklTo7scujERLsa4gmn5AOfRRYeE2CrfPe/Cep0iZszjlGY/MXHdoTOtJY6K+ywZa7QXL5

5nRuJ/PD5Eo2WJ/MTFtaRnDsdCLqYkKCp/znUylqBL2J8sUvbopyda93dm5QfsZhCXOENv7TvRnrB7rH

DwtPR5JrEekC2pUD1DTjBy1ht74zqoq3JtQ31uiNIs
xtRtVvRASEU3S5AWVDafYPyOPJ6rtuWVszmuA2digZizmkOUwPW94U4q7qmd8lwrNDnn0YhR50i7kSQC

rO18I3nKzYBx6r2Hk14109TSwFv9nnHep3hhAa3Cf8SLCaNDYdbAOaclkfaPLC2PcEYCa5EvX7n3yFet

D3ScyHTUGSFka2yK2YScfRI17oENM02MCbpJXORsRy
FCxOVgS+ahcxEY4fZ6m21K1JL3qQvf7qBIC8QkbQkMx7/dM1nKt89U5QQQFQ+8v7N8Wuu1YDw2C+ZQzy

zFi8yvKyMPmywz4Lz7seukuUS2oQAcFh2zUsBCKIWqmyKL251ahvvv3rhDa7QLhDaOTdfCM0a5bHpXh0

k42l+G/Zwyj8WNvbBLkLKFFdCROkwjC2DMgIyTpZla
SUGO55iNAHRKGuc74KgV7fRtI+KPJyfcgcLdN9KJ1HnzZN6cTmiu1w24SsCIxcUr0ewvAlgKLBwAH360

t62eqgUTj7IgFco5p7yXDLxyfpNDhnwQxYSxHNj2kCrUBdsvO1BRZbHT31LK/lPzOS16KSehB91k4sfk

ISjgsZLxdSke1ypx5Ld1U5hyK5MwLQ1wVhm2rX19HV
YDep7xldBeeouFzOMigXfNsuROEuTtNctRvz5iz/XSW7Z33gxRMYjOR+TMRY1ulDJ/FKnUs3kzxG1+4P

G6M7acZZ5aPJtBaENOOhwdBl6DOnaAi1xAOGTwzPEJvF3wAW+8BHYpCZ/7OUOMfe9zo5/bP87h03YT1U

yxaPA3GnsLTgvabtsCnUqIBAtvmjILkba9G5cTv6cK
W8VfNvgRwa2O9gjXnoujg2agvbvT933HG200kYy4+qdISgPk3VBMbD5xx6JNEmQmHHkkqZz8AhBtCek5

rfrMR+gtJ0MuXVSu9o4k8JIHEFmXtefA7rE4TNQ115qcsh+PEQV1m7eyopk2jEetFz8w356L01f9Ll6X

cph6eXcpPnqDrYCXPa1H8M4vBKVj0Y4l2umrrkrYoe
rzGzaA+E/uz1ZEcdnLK5I4ijHynOAfkk0MY0ndNjLBpVeCNxdkdmaoA2IeRR5QdMnnHFMJvK4vGDFJ8M

W7NdJXxMv0X/07quDAjB5D7s4LqbRbZameAzsGgbq56f2Wyr+cNwjpk8O2WCLzAMhaljWCP1hNe0D29N

bf9z6WTyx5/zRImWLDRGCg76S3QwCVfcDMPrlgO4pT
wgzSB1nF6nzRY3yclkvGNf2nOUeUSuCytwwxATy1z/fFpPOX0ttJ5lH5NImXqOhNkkE08IyEQYPBZqrJ

Ih6q8J7B0/Q+iFuBskuAck/siRroMiYQmyT+D8Ak+iiC6tcZ/l55iGWqImjjLX1b4gt7MgOHahrTMEfn

uBKmsV8QcL7hgS2ezGPpxKPbTfHMIXSvoqTJBUXgsT
GssqqhVGgspSLb2E2IyQvTFdeAnWOBLnQtUQAvdWYlOPR8Gi/st0//krdSHg0CxazGgSlnT6QsnE4gHl

+zKnoyaCIPQPeX0EQeENQdLN2zBO8ovnHvlv10GeceDeU0fQp0QzMBELHub/h5Ai6vhbqoUAzRI99J/j

KOy1pX4GF80mEyzVGAhpAbXRwHytf5yFmFAhGnJKGH
M5tEUNP8i/ggE1P3Vl72gYPk/SXHt4cGd24KFWarAD3nDjVaE/2Gl896KY6N3SajNCDJ7uyxJrz+3zhj

miT0P7KmlvFkGfibmDDBIpVAcf35UABBIIBzNS+MKdQxMZGaWNEo3yjPpO4lP57RZLswp2Dv5p+aDvt4

yxHJls5hPp+4UPHspnuzrvi46LeD9eOlh7+mafRSlE
7yiYWwVX5RIcFtOjlVCBoVHy40g1v+g07wiu9DLzIgNQs6en7TufBxmCKh5NSIdQbEjw9OOXBo1TnFse

PcE9Xdt+IJTd79di5rFJAyAL1ZFgcJjtCJEqIlukpg4KwtAUovr7psBPT13jPWIGczRMMglm52bfq+Ft

J134oqx3clS5hy92EHnOqIX1DVdEgWnnCfCwqeRGAX
zzrKMBzAO5Rl1Vmezm8+8BUzkRhYHC/Dp/xK0kI3wQxY6fUjaCdN2wuizV13zX1YMQXw2+0ElIMCO+67

3rpC1fhwZ8Zd8Z8gGudHP26CGJ8OWttL58LUVKP2zLHuuhhWyAgpuqStproPcxj4Ets9Se7ZhfRRa9iW

Rh+kCiOXcFzEf5I1kg9KhdarbV8aeBMwFjvUb+0yfv
s54omFSE2TUhYHKJF48aFgQR+3NJcrWC1FIHVUw3MkHrQYqZWbcp2rBgLAvlQ+MlFZkXtWpMzwUs/5Q8

eiRYnonC23o4o60NjPLVXuAp4ppvOfGNJyhlIsc4iuR1zMGk6trrU5j1w71P7fRtOf5jHXU95hOkwl+o

Knq/S5P9iYjojFCQ6AOSCjWAIG/D2oOIse+1zZlaxl
YUzMb3ewhzleOG0dhkile2T5I0I4Ux65oJsvjAeQCS5pB6QQel7QXM62BH9AL03s0JfqXeYLn6JRKH72

wBFOioAq+J0+rGQhj2Kb5sk3Q95pJXLEZYW7ZLOwFA/etMQYCinnrwK2zijey0yn4eZXTTYUimQrbLOh

SM91hDLRL7uVixbWsGLMl51VYVTYE5AtHB/5rhqBML
7Xx+EsWhAKDuao6SLWKbs/1GDgMLOG1dXK1Z7X6SCFx+EUb4vfLqmOfZychabp1XCxyFHPO8BR9JEFf3

Xm42iGo+6miGPkS3Wj5YOa8pfWVqCNXLib374AY2P8drYkb+yKpSJCxyDueYxSZsJs35i/Fm5TFHH0qH

ileU7bHdKisjd4zbfjPq3UqoV0H3ncJUwWcabD0WLX
vDhM4Z7tWZsyipzeyLkwQZVnljO/ax21YRjBkHlx0HSJvAmUp5Sqle2Et1wUNddG9i4/5KOkY7iPlenk

RiR/KwXg0726UNlax/lYtxsrfFObdw4ocNJ7UGvswndThDypLrmJdhQKC2CRgeGizXhN8TqyXaAhhiF/

pvgTMBThEVPeLU1rNUMmVLMNiq/OeZGj42tK7u6bgN
sKBylhYJcsFhm2vNaEjPsrnrhUE/tLpIxI/CvKb5+ElWKYscUbP7QjuN4kUaAgXG7ALUk4z1sENU6P1C

0PDOg3REgTEV7i+cky1G/50KECuVrFi+Mpo0dMH3KaSf/0tg0F1ZpHNUE1lxkxNBoplxkMm6gRFb7ui3

dWC1eHh1iIBVh7Pn2kYwrLiDIqxYI8r6u62cOVe0Qx
/aaaCkQ4UXGoAZjSzt50X7MkgHaRoQvpLPU/ml+ry0S6HnMpwCJu+QtWqFh4kW62NBJRXr4+eXJY34+e

nhfczEx62/uSkDAyN5Lp5wrAI+9iOnbTXfZYo3EzD9zu6UgcKDC30E3rDRMDZXTe8qllNKtVO1APSjGu

dIkRTgEqoI8AMMPweZZbSdvV7qoqNly08GSdZ5jckI
kJpisbN4qCOCzdvu0PXox2Mf35iaKa7MkwddFlKBimylxYGDATPCpNyD+n9nvBo9gPd8f435ibSILhCF

f3CZd3mZrhYz7nBHq1HN8K2Rae6X8KVnRBVikHn6ffVi7Q8eq/ikRaAk+lge5+oRSx3vvh7fazC+61oZ

U076wpTZW9MQuOdmq17g9EpTzXi+FP4DxxMafIt8GX
RHaaHuzbk7sfiUrkVOxV2PFVGYS91SD2Xdn0rAQqZI7zsYVQnj+GrW9P2kknIDOEwNOpVtgRLkigLLpf

lPsa8OAXK7pSyTMXhck/TQ+KcisV704wbgxz6aEhoyfztdfmOK9CaVOwF+rxI6cWeNJonOLHdNhid1Wi

oDd04faHpxSEnf/KtN0wzGRvpvqpmQPhfmg96gRv+P
ppr96jx9oxv12ZSmKQOqr2km5+jPHm09CA1zgKeumYWs5/0GPPu19q2eKliRf5kou2mRdRbMm7bvqxYB

4bYd1d7NPgfpzj1WF9EGaV7m4lzYkCkaZVr0O17mEHXGRzwsQgobZsmVF+X2YikiB+NeZNhx7vwHH/IG

7VhoaMymzn/ZOZ4UtDolrT/Hh0ivGoexuRoIfqTtGT
IzglC85ajkv2tcTSlm+xpvCFDGcIdMRALhYp+qWbnoEFMQOaNsqzLntPhREcVn1aACBnjcX9YxnHvp1+

ocwLTwwr0zmrvElTP11VNmnU9zWcdNXBmzUB4upmtye3wCmlJgwey4UdS3r5kaCppDpRp33TijNPHjgb

RMsrW+UiEwp63UwP0N/dQkpfW50J7UvsYWqJJMwHd3
Osa0swztVKUoyECGvR0unEmOS67fyql6N4hB+XR5NBrvbkVOJK9C0eOua/s3s79RGv//a3R/pU13ePTr

ZJY2CTOxh5FW0WeNcnApByKvdd68ksoiGZOl5TYoEt3RoW/Ld2RGZN51bf4LMP40deMcQdoxixlL1dq7

WWaYN0wOLn3aZNlqYOMvg4yO0UA6wf9miW6CByoj9m
6Kev5co+lV1kqBEynS+Sc3zUdajFe5tIdWa2vAaE11LFaGzT8J+EIiAhMm0rQ58Lj33E63FyPLnHLGgU

TeyCJQ7x7AL1aohuqXrJzGYhGnxI/KkNMWVef7h1NFV/7lSWhzBu9xNvmwOwAg6N/03ONbqhrnVnE3aC

xGq0WM4RC3KSTo0oyNRyCsm6Zkea041HEaJO/IcuxR
n6Ae/RL6/EITeBGf3DGsCcRN5/2tUiDSvH7IS1D9g5JFjkTdo/MniSGrA0IjjUQEsYnVn+bou7aG8aTz

j5RAZ2GNKDaFKBy5rEf829rgmzAuheI2rI0+2uknD8sltliRksh+pO3/1K3CQcdXiGkSdjRrFQM9UHSb

amSZFKn4ZSFud3wALtUwd91266mx62OQ1oSxFJHaxY
mvGpRmIYOVu/MGFZvUh/LsBvL+YDM+sWeE2J7b94HhrsTP4vWkuqXDgIrlx/7lQnFavOHNENZgTiT1NF

fvRrYRH+aF1cTB0ff5NrOvvKQdx/Fdu/GZCOLRzuNJan36Afwx7e6Gp4yJSgIvcMoLjEDBtwBH0iHDSh

TuzAJn/FzryPI7dVOKoUy73Y6QQCcVzMBxzFjpVNpE
4yGsyPA1k9kr6Od36bGo45Ce1CW2Rlyof+btIyVYgMT+3EQvA9ybmyh7Iae7v4MEvMQOkGXWVlxLNnh5

4LLA0sIpmWPZqbxzEdovR5Epa477gv7tXlwrPnDztYtMcDDKJ60rXUnxhrg/CpkDRQY6YQXbn2Y2Qde/

vBpuPpEXN2ktX2AQiQW++0TwSUd8MRG2m7xKsgytci
MD+53QFfvbBkgOdUrlsl9Ud5ddcwGy8v1ij5TWFy/3jTXmOUFauGOon/PFZbLI5NUtVja31EKOVi7bJe

wrw5gLhQzulBqmLEZUDLIE1W/i468Nlbvdu7Pur2GX4tTphHqRzjbaiF0SFYlImCQu2Z3uvJ+/CJny52

6lijNYmHk4Qsv4hhknZAAYYG0NTUbenVaqVKFea6g7
t8SchQJlJgfjV1UVI8mDf1cEpcoyEjh49IJMlwPMOw/bihk/MDzTH7/BBIPVyamoE4RKfkkP59l8kYBj

LqDSSz9TNycciVIdYs8CrRF5UFEiVLDhhXMQYPsLVJ7mGHwF2cFac9hYI+zAdoU8gpZdkUv52ZESnc4i

yzbXl5lt03npzlKFh4z8hriYTF4Cf02V65hiDL/9yU
tMprpB+Yfyrr8Veype6/igUgtkmK5/k3svfD3KcA7buRL40K6QAKJ6LXWlRZiXUHmtR8AbO8bSLV6zTt

nTmz8Ft19ologQC0Ool3sxxB7tK7Y6S/hLJPbgapMNVtUCghdZc9Rq/YbJjx9UwISWH0ocBpj2fVuTU4

rsODP69c5may0+UDCMImazhGhkbj2t/phkQf9elOMC
Yeh3TmIwBJ1Y0r9k3pBUggGoO8ThqBO4LI74o8eSFOQ8bc3EG61mwUQ+D3o5tyrCGPq5gJ6yPTxsCFPz

0DfdvzwAs8EjLZTFAYHWBnNYhiyuzmyJDCqGUUhVp7UKUVpe/xEXbza8AQkWxpOB2kAr+qrRsPdzWzkw

u4Ibi2ZCv66UQw7MFV92Z1Ib14/2abxpTFKmy+PXv0
zuku2fgeXIqyRJ6LT+RofbbnQDnbyyi2miNnZFzLxA+dUejdtH4h6rINKUl7O8PGv/NiyumUZDy2WKz/

Sjm1d3jvWBRKzLME1oFx822r/m1g03w/7iSVk1N/CjCAwC0pB176+r+wPxvdR0+i1gbdnYuG3rskrPrh

LVXsxylgQeAanjmnX4K5vBRRbiFtmEVINhC+DZcfV1
BV7fQzrErQKFCzM5ycqO4RxJTrk4T7APYFg5Frxt4MMQeV8tXNfhXAED5VDCTcSf99to4clS16/vAY/d

c2+6qJ5wnBUm3tPu6D5hT0eGGBI0f7jmL0JSZ82dyMvhJeoHN5tOPzhOeMCKDSCUz2YAYKlHmExvg46H

6GjhT1aD/jnqFj7fjjQCVzP0JwZfdFN2BEakxWGdjp
9m3J4s0Ahx/VPqjJa8g8WGBkevlq8NUeYL54sp48A5A9wZ5hSVNKVeAIlay619k/5AwsNAxNhENekwjZ

sj6SsPL2GyOqn7kHxAS3RcafOQM6H2b5NSbH4i1Rtsc0ZYbsJZ9yUueNy6FGnQZ6MKGRv769D2DjIbEf

8uw1xm78dto5Dap5iVi99s78j8gZ45CNt3c2J28VEz
i5heDfXvWc++7butfhN/h1jjV7pYwWxuXhSK8e874YsITUHS8VvvB8+VhqFCCwskD1oyDYVVab5QGB3Z

REmwzV1XiNuKI4fsrPE1Zf+tmuhwUJ9WkFheaq6adZPp+tSYHn5fxOFDBuVRDxLbPkDhGKA7heG4mD4p

mSVU7s+EqIoz0GDZupiDn8dmmTyX5nRwdcq/oq09wY
4VXitAZ+WRR5sOsK32u06nMusqocT+nptVZMp/VWuRRyLJs0sQuJR04jkYRRJlSHAaFhJraqz963/t4A

eq/3vx8J1ezJ0g2fE3gYCDHtrZJjMDuPj7p46RiZQqx/bnNKqDuLUCBqJU48VV1ID/A6+MFuXbIc9m9w

Wcx7VP1rHFCnxHLcDI5Z58GwQrCcn6pVpy3StSHr2t
PpItXhJWUEYHCKBOL/UFC5HpXH/5kI5Nb98LU4uFGnHmgKkiTl2krcaRqJQHCRkfxDE+81dL367jBB8r

+xCjyIycvU61/S3DBK2B15PsLOQygvw/BnIcAHV5BqYsAVtgqMZuvylHqgZESrp9KAGCDUkHHzZ8rANY

gB1LTvA3ZApUHejzF6qOyZXqGOyLivsT8yAkSno65d
BfMvTwFU83bcPy4v/xCVi5MGgl2y1wIh/bsecTY4Lgxu4uAvDLSBeifT4xQx0fxyyXhLhtvsZ3q0iDFl

xrnLhhVF6WuQfOTtxz3VF9lm7CgY2ayMK251mE2tghilvlfGHZoo96ZTjGq2GGt1IDu+hsJAuW2RIgpW

EFTjMvU6BtA3Yo+ItdvKGshlACZFwAhhZwGv9DpZVC
PnSVQBhOxDw2MHfR5ynQqsK4aqdTB6PccHzTgrE5eiHwt2geYiVy13YuHOQ7pjE5COL08kmrlGQwRutX

8r8cc7JPhSNswNHtzbzdApwbm+jOXnv/PRMAh02fyVTqIFlFYSKxAziGTjpPtV8fupEeN14G9otGBG22

otKgyH4qH0qdrF6GnqdUnigtgg5eoYSfKL9zDuxY5I
MTAzYzWQKAO6y4HGicCj5X4CmiZYdbTBI8iElGpiocymcDNz6Z5yxxIGiQ2JjuvJmepPTvk6tBst72i1

/24w16GkIwIkV/YH8MlrzbOSWq+MVydFHdY+io7v2NBxIjGQMoqt9yhI15DGOCnmRrLBrT4CBs1wFdoF

1rivK5LzPVzK9mY/cHE2s+GQdQkyNPaY6M2ao6tsJv
cc0rqmJogkSP+9gngMYj2YvZ2P3cNPlsOidYvFgxCa/z1/kuabhMm2P1blwnpAcSjqWHdERMBeDqqjJE

3RAvks9moMjc936ve831ps3e/CGtoapAdeqMX9Uzvr5Wdn5tGYhzVJNrT2cZfJZbyfFsn2WDx9oTOuJN

s9m2lQRKrrTG2OgEItapaMBz2amADdbzzGZBLAvWQW
yW0zTTl3yFuj0HLqb9YhW4/BtM03GajA44oe7LuLeX3RU5F3N47IuqWQNaqkrK59smn6HrFnrhd8ypNa

gJlZ4VSwgvnh6v+H/Ivzxy0J0Hrve7HU3kwewHIHhtyRpAOWkO+aFuxn2eiax/uwZZTAlRNKURa5QcGQ

wCMmL1jPUubvTjxfNga5VPCVw5khupQjtto4TPgQSr
bXyJzXUsL+ungQWbor8dhKscehwS7xgpXLw11fmU5L6tDvsZihBW5iX4UBUGD//5LmNOL3+/4H4CKQPE

phT4MW+bat9hGCuhBCR+deFEjgiYeJdsMyh95kouA27d+zm95HqDkmFlo9mdousnSzEzbjTrBkyO48ad

kf+3dt4l6XGvMf5Fi9NUeu5ufGLU+Vusl3kAn1HOvq
3dEO7+VbzsLyMO+F77nzQwSredHt1W64pwDQs+Hb5bL2gGpUT4IJC9wXLOOhBMeizrCg8y6hIbwz19VY

04MXPtWoKmcXUys73LfR1xHTn2wS89Io4TEuXjxnK2nZXM2NUL9UDu835I0D/Ojw8DETEu68P3U/rFgL

fvvUcmwUdJSUJQzTrWjZPVQ8e/KP9ZsRI8PZjZiaBg
ZfLotN3PLHHOj8TY7CtNPrN59qg6lHtKZxxzIoQvGtgu1gC2dA+316Gx26f0p+7RNOmD4w3sQTgK9LMC

ovCOxwnPOHuMwsWkx/pMmw/Ob5nxwZsjaWcL/jnmXwuJoHSD+OLPPqFHCJWmFFiEiS1ta0ItaqWsq69q

PuPbbvagcoJclTx5NEQMBLSTLl2I/AqG3It9dkVtgm
SuZ3qjnkKV0/XWtZ9g+NLWCUQ6u4i60f1IjvGt55jFEsqgxMZ6vLavATJgi0T2oWY0ah/LZgmax0HLDe

n6AY8ZHiWfJwIz8Wc047NZfrUM+KKwa1R/IprhDgkpKspEMlYjoSgbwqSic1tdt741ng6VAtougS1A4K

WVMLAFRNAikGn8ty+ZNvpjh2UR7wa7Vlo5IJ2PvdGC
QRalqht514E4sdHhM3eP5Zfc8mWog4+cOBTR4vLKuC8Tfxb0tbZuvj0oF60OTHCuD8CYzol9IswB/qdi

JHunTwHPjrZ+fPFhyEKznxQex865ufNGS8yN5sDC+HrqSH60+L0glZIQNeSGTU0liWcw6c8FXXnaZ96x

Qx3L5Vic7EH0yKzZdTf90t0OyYZ+zirKibNb9BY9df
+djw40sboMP8NA/bngor4MA/cAvxn/1LrWxfPwR5QYCZnIxTf8544Lt7OMw/PXOiE+TRp9H63Bd3M1Ur

YkrAtGvdIZ3wnKMO5ApKCHXblmiA1ug2bYXZILqCryV6Nv3mbcehEKEINL5NmRFE9BL8KqGlIh7mwX9B

k12HGlVp/GinB+DiUfEDPy1wvNCCWnphWYFLM+UfNH
f4a3ysQVa3rSBQ9mYk2+JMUaRbtmFjicxGeYNSokuQRQbqW4vRiTxUYr87eUJe9IKSmQr8VKBJ4R9S6+

RvN+ore9umvMcj52PmqxzTXeUE43P2/6DyJlx6+GsdXnypf09rXAwVzem+MxAT7+w88WZKgPE2ev7Fqd

agSLA3kFCozXw09I6rP1JgnMN3RYJUGYncJlQDYbAQ
8kVGnvWfF7OzVA5z7JSLHDK/pgHFkbNEfnJsu63rx71fXcCh+P4ojwT2/zZm0hm8p1J6USwCEUpE90/n

L+5F9y680qy6DeAbgZUOw844d+f3ztUsF/aYljNy6ZInnJYBqUo/+EPdKv8/zmiGm9ox5VNGemtlGUqP

8/7i9qqoqLfQwO40hyMTb610J4idIFi34dQxIIhnXh
2Q9oHEoylVnO94Vl2wucebjFTFnYcu0zGISdq1uwHuSOP6MU7T+lldiv9H5jp1oRF9FKoq2uMsG/RPMM

De4ALwCF2P9s2eUkuuANgxphFxjdM6uXlZxDYsIVjd7CIAdJXLvN8NOjEmjEIyC9HP/1QbbHqOrgY/cn

3Ft5yQtT38psnFF0e7vyui2V9496mdHn5Ra8PMR92d
WrFOBb2ojQQP6OPf+3KOp1fykJPbyD+vWSkUcwlCz4RuigZmJfj9hwkgCi4fGXgyqRxJvMKYRyUp4y2F

usNYRODRW65GFzOM9VdIDVDkVOCV5KnLdHZLDqBWcCeXLz8mNgE/RkjrN/uTjRxdjVNTagUzYlmqDRNI

NVzuKVhdnbpWwAxYQCMxAEJfVD1sXdVFzdXrWS4zgx
fgULA9lQAGL9Ohwbrlo0HkG+5iTjyohtyYisRGTYeOOY7/aqIu3Zds5qZ7e+VegTs/uMz197x3IIDJ0g

1FWlE4SkA22CUtGPBBLugCovqhaMF6ANb81LwydQh7Xw35p/4bPK8d+nimbSqKFiTnEw+wk96nGChxyS

IQ0u5y43LUjv8YK/Y+8to+ZwkgMDPeKlJwH7IqvCz5
qjDVN4V1TRGFQGgoIBOKRIv1AmaanSQbGZ79gBezZNzZiqwoiI+717y1HTnDhON1+ht40h7vrTWd68V9

Wz20/PaT6Z5z2zrw8OZeo1ZUY6hKK6t630UtiqjID2fM7xjH4GClOXa5lT/uJR4ltONgpWJ3LHtpKd7e

oTJsQwq/JSmzQTpik+HDD84UyzhR80gP4BpkrX8G+M
M0aIdAV4IQE9Y2hkP+oUuMPiy2GIclWmTAO5ht4cUi5iE7yU8UYHGS8DUwpO8Bo0WNIQpNRoT9AQRL4r

6Edab241o2mmfp3zfYPi/+VBenGwk6+ZHPSOANCGG4RdM8KTEaKZifgldP4NYwQn6ijGCt+69oE2vuKt

pZCk16y93In32p9qPK/6RJtjLQzWIsxdYtb6qs8Oxa
yOZavhP1BPwbAbxWqX6VkofQu7z/qhKGDMoM1Od7rkechrkN8CdZr30KAskyhvib2Zc4xbX5/szJpQtc

KAX5Gx7P+1OPGSV8WVl6x9lWZJge3HzWD8gyN/UBBmnS5ovi4W4i7Yj37M4DPs34GSRY8im+a+oEvq8P

wsvCIwuQ0luR+NbFDtdgeFeQmNNzd3aFV62JbnyHD8
MXwEuI5Yz1d5yrLwXe6gxPMGa4xnUZCRUKq+TPSSjIJ1t3ccQYwx4nLID3TdEoJOKLOWVWQutcmlbSXZ

NlndcnRvWnLjsoSu6qmlmmwn1w/4gq2ss0pb/gAaj5PwQxgh+x1P5lN9G5NuhJaISgSDl0+OCEgOIRkB

oGwjB8TQaPwbLuT9L9DZ3YSRAzVaPDZsbNfiC9IWW2
cWnkeE3BpW/vKT/1SPi41nbr1OrEO/2gzMmaybyMxPN6k5k2l9pTK+Zy4Ml16e9VounKs4O8e+BJ5aWI

qL2LsQbZUofNFI77g7k8fziCh0H6hQrp30kX4Fuv1OzcV2VSMcIoO6XuRHT2hzY77jeXySKpOD+XG1Nd

rjmJ0c5ElpOFJjK5UbyTDSK6DT/6p2py994WjY0FSP
SxEtQm0uDH//4IOpBlgQZew7KGVAbbCI5qvIUM5s5kjMw8li3wEk8y9WjDcidp7mrrTQgTgYOx8WeYaD

kOLOzqX7FB1UfSkb0hBCwQyZcfeX56igdTCLvC1Kk+bS2vPuP0DS+qb3g0cyZ7uftfWhjct1s1Lkfp4F

mMEr19G+BlxZC1E+l6MF/B5uNRpL433aBoFCFuiX5i
8k18aJQWU2PVNQd342ZzkapmUhYISrHYSqwVFTdQR8j2uHO7uEULLqfzn9Ur1c/YVLdqcE2mGcGRfovQ

02geFanic1iq6gUEMpuvNdMxLNC/mDGdbgI0LXSnWKasasaYx+z7PmtZsh969ll0T01Lpz9hQONN5nzN

8FuDz340yehZgbpnFIrWZg2WiXb6MIB+To1JR2mgMj
xrp+cSEb6tOmytNZB36HzOy6srpWXP7IiGvsaYtCmHvkEFLCS7q99LgACNKEsyYvxUheJEfXHPIWjFIZ

g2roC1ogGml6mCU7Rak3Un77VFWQr9w9tgGKHuTWcdTd8SuCh03ea9etA6OSUMg3wuQ+Qr3a3TO69yFe

7Epf5Wv2ZeGA3kj+8Xghr83vz3jl1LKz7JAGi1u981
xEoL9KH6z8NPDU/CuFVMArMoykYudwHui3ATW5kz6/N13qtu0AwQNmCO0O2utG4XrVGFkG1E+ArlibW4

yBu7vtO5EGft5HGHd64lUcvjfh3cNK23U26Z6tCsCnWgCxtZyw23y/hHOlC8cKcguzDje9sGNO58j7vQ

jWP1g9p6xGPGUvr1r0570terkdMKybnmE2NcJ22pY2
l6MtoJZbp7gUpxY0RnVQt7NBNlahx/dnXt6402gbVi/EO+zmTxjR3j+ntjX4B6c+xwLTnLorBBNaA9MG

mOiWebpq/4JR5wMzIom6thgEtA09pGDtrSL6UiM17AV2xa5208lTpmbaS7Q1mCNfFpMWhmQJ8QUhKQzx

n4qBb4J97HfJq2IJ59azNEwXkBa3qLQFU21x8uMgqi
1QZjD5ob4tWefk1zICnxJObVBteCv5k4tgztESayN2uMse4jqIjukDXbViwNDbBEOjMZqPUnZcnFcqSi

giIyCohM5JOTd9MsfO8sUxjrz3L+OUfNaJ6Cx0Cj0pbzqObjYZGBS2yhto+aM53QfiRz3DLxKWwLIkoD

293fP3bz4i/gv3927bru08KROlJYCv2K8U9hoUxPNF
TTrOFkIJVU9quacnNghjdCg2C6hJw9anjL437OQcgOibbLEltu8dgUufn1ot88c8Sq8vMyZXn5Aw/CV3

dkGpFCkXSMu5+e170tM18j8V4Vnqp8ZhjiUEX7gCNCCBEKO+pmWkIRdD1FALYhcKB8K+1aRnUFqTAIor

qNlEkoxBFdZRgBrHKJ6SIzlWymbiIeRL9cCI14h7Q6
FPnBiG4pLu0IOl2pk7swAqZTZyIz/mlRDugVcIXGz1aCKF4ItNHbyl3vbQv9FH5ojbYOj5sFTLA876k/

/eT1WUhboe7YLb/G0YEgFFKajVxVNVZ06/lAIvP8jewgYrMyaadpsCv4GSbjaG1qZSAruNc1yGRS/WtU

Qwdwy1RvrWFHVwGSA49364bjwaX9v9K35g6wBlC2tN
SzMBgXxeUHhAuEs0jG39e0jqKotbdsYSWqhhxxwosLtKX9/Mbc7EnKHI+3nyIney9hYHaQm21wk9CveJ

SNXIGMvX0yOLs46MZrTidOgzug9mXU+r303a2SwXufV+zV/1i2lTFXzGCRZ/t8uIc0T4IXbJquQAoUye

doPpU+vke7lbF7JPU50Qm9m1MeErjnVOsMdp1ek9+9
3HBOoLouUmpxyhu/CZzdWFgKy14jq4t6WCEoYFd+DAh5xIZUbO2/txj5yQEpypQMA7XcSEywYFlrquuy

OEVUHC4QWM3GzgAgyrxZYU86Y9br44hXjgBKgY9axwegF7gp6eMQYf0k4+GKv+Ur9u+iFCsg7kQF4L2A

IUFezH4dEkZ04YbQcF2oehuWImy2j3T3cZEWtobnqT
ZVf5bxngKOf2KI5NRJ3kwC1n1dwyX0C6UKhUoo0hTu8pqprc53mIuJNoVXtv8GKxvh9/ew6+GX6ObcEz

dyTBgVAbNJONq24tFSzrK4mmvxbD+pKFWIbdKOkvgYqvSddixd3SiFeb89gjqoZiZMrHH1DT0qqV8QUG

SqJbUMXbAfaQHzbiCTx/SUwsbbvj1m3pqKadX/IO1A
yMU4wxV2W2VtZWnB+IYBvDy3c97u1PKLbBqTwo9HeTHK4d4N89wHX2SQhliN3gXYViTtU5fJcLr2vFfH

nUgfHKc3egr1Y11ljy8VSqzI28szQDQY/PEbsXdqNw63oV2OVG6Wg8p7ybvXKidTB7FbpUBZH34aeeco

FvoGM+1LLLyO0zAQtmSjZslxcN3/tutHu8n/qjaH0+
OHjrQQrrBIL2RRDIaDFhF0y3W7t3HFn4YGd7Rl1++eyldk+XFRCp2NkeXPFjAvAiOH5Qvj1cfm9/9wtS

X39mxsdWQOtgVX3XdS9ul/NB7VOEVnbOB7RdKs2cfCX/5z61zPGUtz91f3ALMjDcfyRWPgaUXy2DaYIn

H9GHcKQB7hmsR7j0NS/9KpaJv3+MILLS+QoJkfJM5E5Hq
8yJJjtgu4yMZ+Q5MJXhfxVGh1vMecvEZ1icNWsUDBLVahyMddYTMO+2LsPl2RurYgGkVFSSAe2gFDLum

FqcLyBTmBRgUHhwBn/fwfxgegLLQag7FXoOCEROZ+W7pihrkEVZEnnrijNXPZbPtcAnxlgMAfeCUAuxs

TiGMDqfVS4crhZJJohAy5o8OlNi3OyPKtbtg8Nq7Db
ilsdTKYKd9WdTBpkiOvbpqozA9N98OomEXmwkLMVoAsjSCN1zpr/H35iq8WcbChhWzG28MMxPLKlaIGC

B1cp8LIC/41R9iIesqtQ0088rilutjdb2TNoJjkY4dLPJRElvoknG0PCxqXspo4pI5H781ZWib1wyNNZ

EWq3IWOkxx5YbA+qM1od95O83CZMS36VxSOWOQdgRr
/ysUB3fgBs4rUxCViFiYSnymRuMJWjgn3uqXxMINRXbVVtZoFGDyAq2eOkPuPT42bRVI9jwPsYAetXvG

RRUXahQWVDHg/4LYYQgiOIzcsQoKr8DNrrhqRTgL5nsM95lB1CDo4spzmgFLZZ2Ea/K62hraFxub+CvP

Z1SajrU+CghftBE3PDC/nMd2qLVGKvJ6dxJ9I4gMBF
oo5R5i2PUqIWIKoL+5JU/8dXNDVQPiXeFvPYcCxLzHKabeNbJ6lveiQQCcnif+mFaIc1+ZMCXVG2c7W+

OHNavXknSPUiaKamUpgJUHFPLzPjxqLPVj+h8HBzoTPv/snrLVVlpAJgL5A5OegIUWFaVT0hQWemWRJC

3ID5pCap78RsIsbjUPTxQ2e5jY09/AM3TZ3aHH3Cyu
oJX5uH4ijtXU8yV0pgYMn1q4AREIr0gLqQV0cA0sepp/7OVny5fxwNjpvjUhmk7t7FVYOHKe97atwyvt

CjOFV2SijtnF/vqOZDYI+SpsGqLxaG7AQ3Wncasb6m3g8uLoUEDreiu3EDeIW8/AMbKPr60YdKHT5kVP

mv3XBdJA/IjSIKpjKdqTR5JsrS6oNTVwAbYpUypV4m
CtCRN6ZcgLNeQAk/b2s4IcY55jcpdbjerUbeMwJw0TeaDmF9awEPS9Ad68km0AlRYt1sm+KIkbS6lGQc

ct40hPKdkR+XkjN7ihbg0pilz+arbFKpQRSxHu98LQL6F2CEPvBVo97ZAu6x8ifHiUd3gmlYPf5otbHG

NWHzGNMoQodYarKyGMBt4658IWWZxajgSif9+VEHlR
aPcNhAP2oHQF+TUGQI6q2A4L7A7JNatDL9TColfuSUd9abBnySkAqNGITkhecaS9lisDt3MoEiK3Rsrn

B4Kg1kAaEFX8W/BtdfVcb/YehU8rw5lob57EcOwJdg45aEFT6XPj7r1r9CZPsika2pQyJTw361EBccWu

ONbK9ZsaZES1cxN7zY2AVZv8bzIeqV3hCdsDeU1+Ae
8o8zMr2k55wN3rGhyDLtim2LEA2SNA/vmhlcOxU2tAgsWA0MVoBbpVlIssXvp32Z8TRTFX1vEg7KzuPr

pVMgUe65NYOht2k6ZLeF3WL/V12F7yAy1e7I5j2BGQwpUxJjNabtbX1DA45nKNnIbsCUPxXBLhUI/sQD

XP8DuGE/tYcFTM20Cir24MiS+i9cyN4E/DtOADXLPh
2Wj4I4pVb7S/dzAqYSIekaKeDbXn5seYdD8CxTgdyvJ8HfbfKg8zGJ/ZHS+pRhXSTcGR5NNL5gwkj4bC

Y94zMAG+No8zuhEPnF8Us+F0VTJpyD9SJsjTcSN1jrbENwMzhcGdo1RTG2qBz8sw41DBEx3mXgzGiKfL

bM1Jv9ZwUrondIh+RSlephCOI5SAOsx+euVbl16qgC
jkiIuZHA4ul0U3ZiRgKnHmuxWKWvfkp3Ek2xTAcGi1bwkDFv7rR/wDB4nWQC0Urjbm09eMxFyxzi1qti

QiLmYrwhvvqhoDMe4RtrmzRko70U0u7nqDLC03C4RagHDSg1tS45kGgj22JXOFzU+Ddn2oygDyHzg1xi

ZvfgArMVbDBa4lXcroojm+AUV1w88y+izmoIdLJ0a+
3FT3IQ5OBrsalIYShY0E5SL6TNEuqUMy5yMMtueO+P7FiXm8fOteV49jXZ1rNsr0/dJtDTw/J5ud+H49

FKBLDPioNSxiOFdaVxSPugFrho0JpUsi8X9bmWszQfqAwkzP1ZdWnIoe2Nxu4qd/IIy6IYEMAFXPY6ZB

1yRiMp0HDOCA28kZc0ALGu+YrkoIsP87+/+4zXa28H
IycMcUai8WUcxldbEN7fxUTcD22mj3aBR9RBHiaPRc1xY0lE+1mDu+rtahTP8eNYpXZEX3Ck+F6rjNzL

7tlGXRmN2+fjVHkJfw2uQ8AmyDiwsrjsaud6KbFLtTIIcNmkPT42X+VA1uHoKGhoeo9vVi3p0vpyoTZM

/Yu6ar63ax//fUhFJpSosOdGRrEPLa6ZBW5ats41cP
bx+G9vusPyLS+5HrYZp+kxQQlfDwKWUaBvm3oeoJ31lD8pNngKv3XrAK8wPEaBhlf8sfIT/yBn0csel5

O6Cwpf05IjBoMziw7S3R60ggYvdC9c2TQcqI2HyQMnDh/tEiEa1ywb3OeLLlkyQoY2aiDe145CMlVqSK

NNQitGgxBZX68wL09SFotfeSejM9fF3tAwxNMfeBV5
DfXusvX130tS3v8A2SVJOczbQ1EDFWVTN1ir/lc9KPf7dmdfg1ByytF/9Dv6nQU1405gAdD4Rsf+xV/8

wU524bRjbOdxUxoX89ZO49xFJpaj8HtXVQ2Kygl5hSIo/D7cVSJiumJenJR4ETiZt5lG8hZio51UiWMP

WajsxMBx2lGJG+f8OVAhFEyLsInToKplOZguvPMLS6
pkMIiPwZbzeeNmXBzF32/xhJvzbLHYFYTr1rTmX67VubnJaYEXr3Y1yV//dSS0C4SpO/hW5lOKF7/1S/

jyoBswwLGyZLCpxWPYzJdJz9dad+NdCuVgfDBxhrNsGS+XJQCMfG0e3jNLTeKDAFfTXLawomCfKiQZWA

crgHZwxh0kyAEy2u+Nu3sLxEdcuoE4/3iizjf1FM5q
iuchHkL46X42QSnB+3Hwjd3Sj2R2xkrDbsjRjR/ae772Ro+LLhAT+NdRzpVagM7CtSz4+y05fCl4QqO0

SmDCyJ1Z2UMhKxSu1+0xra7iXOrX9znU/TLyDDY1YStmvYWuvvOLv6LOTfKfMZQrdK9m0OIFKvtOJZv3

kG9VEYbF782wrJLIO7cGSoV5plwhRKB2eZL1h9GXYb
DcjdfQseBjQ2yPeeG8CwRGPjCd/Ox6YFEH36RmfI5X10rthRuIWftYyk2XWnweEEdGEgdifUQvdDS9tl

q3KKRwyfG+4UYJZ8ML8u7qqU5dg40/ggeHSXzIt4moML/l8EL210DCrwcuegK65LgQzj9qy1M6+MRcFa

c+WGiLGn6orwNvsE76tGMf07NUZB8lQ0T3NPA4ZhVg
cpg0k7aqFc+yYssd6hf/wgKkNkQ6afkVqZkPz5ShgzEOj4n50Zqj9Eqv+BLpY8qpgR/dFIpLJuMPG1bU

GzBbFlQdqz7aB9LgI3X94C04cDSiqn58w90+/KhZZHvgymAO+aFfCaoBCzfBs/2j3xJGTrkmkqK5Jlpf

x9woPqDecugcAEZbqPakfZ6sm8IVRafOpy4LVhHrwD
prOcoYcf1kqU53onTQ9caKfHI0zjRpDdSXj62yS/Ggu6ye9t5TZttB4CP60LnAQB5hZvwILsuASOK8B2

FnVgfF75aDze4fV9dgBoabiTth0lOpP8aRs2KdLNu7IDypLrc70uirBQ0WouZmHH6/nEmHjhI8dT6Hhd

0yHxg3rsxgBY7BzUcCFSIzOApf3XTF1nN63QcqSD0T
d98k9BxtgxNAEtjBd6jKojjQqd0e/w3a8L7cxQi6EJ0HJaml0g47M4CxCCIO+zzcCVuJq0vHxOJsZNDX

jnGNQiVdeaNT1sWJr9tz5vePfl6fWgWHg7AgF0rXsLftpvZ0Yt/kPVEohNE4C0dz1r4GtO78iIrRfT/u

ZLiS+x9owna+7AfcSb3IGba/W5qZQ7mOJJRdG9yqft
BFy2YZig7JFYfCJntlFMBXh3Iz1AWMbuXfhyeCoWQ7wVJxWT/SPxciYmq0LdMbHDo60/cCPQjuDHp6Iz

qlFKEfzhUnscfrEAFw/0UlS9KFiSBzUs2f6RpWtwCNDNLUwXE73iTCRRDhMnI4jjZ4wbRJgTDmC1jrAq

iByeETyhk7+5m2piFjiJWcKvbUpRqDXawZ0JN8r9Sy
WRJnduvYEUAFjp0YII75RGV0Rk6EYpIazj/qtuhpcHuzQl6cA3zHezZWAywf+dI1vbMWhElZ5wrDrQo1

ZSc03lUlzoGDjZgENNM+nR5byOSbwADzeK4gcZNMBNM/XLeI+J8+VxirOvrJ0Cu1SMBAJen8HY+I3rQ5

PWOrP/gYB+y14mHN/BlBxethoOI0R3re/ZaJpy/Xlj
PPGtuGwgT5xAawdrKD2J8vlPghFWq7+mLCwsMaTTssZfeFeUmtppi826fB/WDKKxZINYG4GJpfAQhHGq

YMs5mko9RGjl/XbVnZaa2je+soPeFxtnJR29Ez+c99gd87FZrfZwwYWgIHjqU9n2g/HSX/qdeNcodZyh

af3odtix9q6tt5IK7ZO5L5di9Rj0i+0yTi+Fsfv5P7
1NmEH+SY7ga5UVZ4fBS5jZT7kyB2Sfo2W6UkWNmfmxps0rLVU9kCx3kXMjaajKzM3+ORN1wXIqS+koLD

By5TrlRjm6FuwxUo8kdKGhvij4KQtFS2JaktRktv/xOYvflKBnPkstNYK99g09jYTgOpynOHP/s0uSY4

mALWYt0jF6n3aFGh5xYbe8h7U01AfK5DBQpgJ1DzJD
7B5RmQKh0tsGflPj59ld9arNQnmFerVMM1h2vomzDpNh55uWZjlgcPtPUjuWrp2Z/6IJKITov1ru7w9w

xseX0UM9wMx0hhVIr22Up4Yk0ahzhd7crLbAWjiKNXhLokIQPt0rjLaeyv8wwwJAy/5z0p+AxGlZtqhg

tA427OazUyUKx40HA7yS2XgxX49qv9f0rgruvm5N9k
KELXsU1XFRcxae5N6yzXsSHrR8o8fReebLN9MRXgNlSaongHkVw8aQThpd6XC5YBXeW6fVFJUCWBRU7R

jyxBWHKeyqUbRRdy0sak+pLMRcA6o+NODj00n4e9Ac4CsCfi3/fi9y6VyqlKecSOOERM0Pe2bIbVwaTX

LXyfOvH0ftWl65DvoJ98S0v5BB2LHFF5OL5KJj5/ay
lzpxCf1Kr5YuiHEFBPBFFImJCAbPbVh6pYtdz6BBI69W0DKG0FRSJTgmdIbeo3UxXv0Iq/Ci1Tefg++r

1mycmdzcWxct59vsfXnLI2efVF+cnIN47RbPt4ja3IhLEiD9iR8bWnYDcsjTRP2SkgE+SaWvzE7jH+sx

cWzzf8T2yqlmGy4EmXFe2Xn2Bw5MF+dwktx+b8cV6y
GH8i310PrlIJZe0Hlw3fb/CXjw0a6qtCRKJBGvvTYEzRrC05icYNKtVSXanJhw1GPZ5W2M7Wyv6T5fs7

1lbRPUt1EB2CQq6vBN9W7DKfiYN1l0t3WNUSiVfy13yceb+AEu01Dmyb5uVwZeuil9gPlYpC+mq6ZvKq

qYVPAet+tDGUyo2EeCO4UMVaZtdOX6AtW1R8ckkJ5q
C7Js+uvgNPM2mwfQwIfm+A0y4ii9san2kd3UAY7SxKFWZQz3SRDqDpgk0+jIZiiDV1FX7kocYPr4HbOW

fVAl5EgQ35+jfbGEjAJrW10VLsfhzUS1b1ezZtqqPcgE++Z9tiIvMiHqTuK6LpBUv3BIsPqhz6CM4Es4

XG9Fu29+GdWfFEz77W2lx0bZ7quaFDLGblyfJuE5j7
ZDLzqvvllP0VCt7d0wCLQhbFbUkX1CA84YfdUDw+q7kIbQmbJdgvUtR7id1Iz7dJNBsVE+aPY0z3LplD

h2R2Gd1SupCU7qV3hUy2NymtN6r/IeE3cia02+56J0146nAl5/EZecnj0+F4vq0FsmrfWSEKuSCLN8oM

4qDqVb49Lle0yBPc0HoYe4tEq4rgOOt84oHDRFTUn9
sf+B6IyTuSBnVOR5SJmvT8//BqrXnSMva6m/QwNkyoZvPnrlSAJnNgvFLPpEOfpZdxTm+EPgYzCOpNGc

Hk/NpNEDLQDuQC2N9hw39QQ/oFG5XLUbu8VGYsFJ972nvJrXg1wJ/wWkL1aNnzAbyk99U0dUsgL51cvc

xLmkWzLct4wPkN3Lq2tVPAVEHL1EXz/Yv+Hciyntwy
24ORr1NLCD539OLQFsvScMIjbVUmDmkrmAnu7jHUaFl1PUvzffqI07vllGcAc+bRVKS7EComdvUKGlZ/

vIOCDTNzByiQ7d6Jzt3LsSDImssqVYugKf9rGdE7BWz2V/orD1ZklHOzhU3gW6i+isWpgfIHBp4pi9fY

7M20ag6P8Cxec3mQiQVwwQKHZ8u0shBsaZ4Jh/Db/K
ETPT4gPuAz7QdZgT0evIBxE6Ou86ggolVrnVBQkrgbvWsGmpXznd7jTxanrf4b16zKV0VcmgTcek3BdQ

aMZYrJXRa39vUANN+KqsBNvd8JQFkhbRsnPYPPl69SyUv3pZFEutMGS+6EL8Up3xo/NnC/tZAq6/ZAGa

jGtXp6vWKq/kxQIbIApGbu3baJ73C5hWL0nD012rrE
c3iGEvpJkwyxl7jcLlhr/0APt+71BJFFS/rs1U+Vp4x/noSBDrr2U2iVaA8Nbo85Q/gEulGcplc6c7N5

duSY1eLaUWP6rwM2FydnvF5GBIQNiSBVFwrb8hS713WNbRRaYUthjPstewUkRHI+/+uy9XjLUCsnM0ak

yvYiaXACJM2/IDbYEpPRNNHjlw1Wzz97Q2WbVIyiLq
SyYH8lsKG0/ZbVtHj+Aqn8+0PGrQdNXpMvaNOwmlj/fERySF7RG4uGetA//Js1YTx9Q3C0D14fPq37k2

LTGkCaMagsjAylD8Khs6NhB8US9bidhxEEYsWE9QJxBrdIfHolyq9pn6OQ8+pFdP4Yc8Cmf5VwJzBBdJ

ECY8gB57cPq6e3g02zVIt48yFV1gKYJNcj73cg0iS8
WJF8+KpLxvio1gFhBvHVo4H1rYCvh1lQ3jNMD1DbdlvDV7j1SlKicMmdMCt5cwkyQ91jI3F+VrGw/ABs

rfhG4edmd6hj5pi4ip6ASwBiz8e2StUhPlysk1xsnkF4QTkOVbuYNo+zTUU9OucxY3I2By6Akidu2V2f

kUTHZb7ytnSYtb2FuB/kbiWCm+XoERBLenNDMWswYm
K0yoTLoGgSQMNkJ2o0DZUSU5Vhb/xR8uuoYS0mQXY68Bw5Ex/N7kJaK7Tr9wYYGCEgxLRCo4UDgq0LMr

B7jtOQIU1Sz0wsHmLE/16vKyAlseZIF+p1ixB3CTzf8sfe6tRjHpppUVbvUNzHJ+IMENWJD+J9iaG3Ue

MPwP0sloGtqIkZSm7hEAab1B9MfbfnmGhHwEJ3gFcR
SMLZ/bimX0nfL8dwTFu6Ln0KdcSbzwLqlDQ/LiOKHToXnFk8XvQ3pj2gDC81gfulSd/xic0zsxsPoXDt

d1EMDwPUZt8jWdvXsxHTqQv1O4yIM9kopw1OXK7k9R3Azrp3PbPyKEduu7RpTYrA7Lh5JPiRrM2lcXQ5

g6A4E0CyfShN349nkJSSnkBzKKuGVW8gmkF/Jgr/DZ
WUX4delvoce13UZYa8fDNgio43D5eVfpaKyxgABSuTA5xVXfON4A6y3QM05JXfViMA1b8ub/0nPE7Ssi

ZHUDGI9iV86jIbCLjxUaKti3T+3kqlh1bVX7D5RDT/qgiWP5M+sDa+IzF8dlwQc5Wj9fw84s7TQJ/x/y

xZS6LqXiP36HXaq52kOYyb5MIhROvNkIIhxcVmdVcd
P01GpcMWcBZVhVhZb0BjJ5JMrXrQkJQKFIkQozCCL84P6SaBkV6G/TMUeppm+osc/SSnRlhalJ67+YFa

df1Ra7VBCCx4iBwwwEScpccu1MPgiEKakYuDU4wbGxcOj7yP3ny6I0/7QyYZ7nIVkIl08Pr3u5e1sYRM

dKsLaf3UQGETHhpiH2wq4t8Wp3yNudfXscNMNCLI2Y
jESiqZ2uYxgDWAF79MEk9noHSK3l4i6n0kv7KUpsxX1ZaEWV5rlzodCt28bgZbazYrvJ8ALmpAv1JV3e

eqbcGnSw/TYw8wpHiqTM8/9aylts/MbSq1uUHePIgTMk6R49PHTE8J1RlGq0PgVKKUiTMo0o4mRnbMbx

kWc7T7sg/PN3fD5PVJ3CjXgyRDwgNOeD00P9Who51f
egOwZdOUR3iIZKFRItteB6aKqxZsfBHBlzQzNfTdEaFT3LA9dQwsd3zxUkBVVE7RWj9keonCXJXEKQZ9

+N9PlYM09QBJZj6o17I/X/EVXu3//ApPeJ/rpqKrF/UWjlkPb5q01x20+iToO9doWrsIyS9nQTTCMtX6

4eHa0/bJ/XwQ2A5G6YSGta0HrTXtuYuQJH5CLATS3X
jjIJsaElG8ecx+iilyCjjholkFxOJ1VeKe9voBzsCzs1NA75dHX4fp5gIHk+CLLm+aSlsYad+m06yeC5

4hKFNcFxhcFm5FU5uWe7yf8kmltU+yfG/9huUaiCjDxrn9T7pRzB3ZEn/PERsqH2lCW+rnWgD0JZyjsV

ZTaxuuWa3Vdjv5aplx2LzCPtTHjqghuYyu22i0zmE2
KW9k0gwQYkGhz+QQlLIUNAeWWHiQinFAHOh2EMtqKTG1BZxUg/otKs1KRp0ErqPdavcdlUNRQz3zbEIe

FyKu+YrBn3A44h7apASeL3u2xgRrj05GlWHMVL+YmJWJcFFrM012TKKCoroahChrh/xl636mjWw5wdNw

JfU6WRWso1GcOokWpKvek8EtgPQ0M6QlFxObWSg0cB
NaogovH2LseWOThGQquzXVye8C12e1XilcXyPyFlk0gOwAUwE3eNKqS0QzTXxF4F35dAOE163NZBqXWh

HyTiAcRG0YA8nn0AlAKLI1jwL+g/FAIbqRH1aOTX+iXNKPd96M5u5xkI6bYCL4hDgrP7f3BUhcmLuL5B

gZx9xbpXeEdwKvRdMCYGxxEEVF2fnt6ATbcT3B2AwH
1b8bUzhcCL3BZYp1nQfu7eH+qMlhSRQQokE5zVqKw+JdkwUR3GkR6sH/SGMo/CZdlvyXmHd0q3vPg5oO

S6FlmoFNnav0TvYvZo6r6F0xtOnSj42brFXsC2xib+Qyx3n4z4xoK1AFNgoIsyNwTixFSjYL+MrNgcUK

fpJAjvoOUN0+p9FrLpYtu2QE+NJBTm4QzYvvcmMX+E
u9JgBv2KB236pIcs9tb77WafsPtZipwWqOU3LoqYd/jTWFx5VrHzEyE4K5oDosk3mpk/RO2ago3ZZT8t

Elo9OWHBhwbG66S1+DnozEyj9KbwzxG3ryvcodOl2LH7nZ5qWwRvKaM+B4jBWbdA+zLAli/NkOxUl+DK

H5TfgyPYU6bJ8LKmJC9IM45De4Xg8HXkETikwT+TrB
eBlWyOdRaJXUcbxj0Z7lJvEp25OmXuFVTBh6Rnl81AOfrmWdrCgORj+JxPofTH2BPQR7VwQUhLM6wQEs

QTFNgy6K8wwJn43y3ODxvG41GNMwjMEVMfqRv0KLN/Cp3xKtpBoTah6AGFZBJ03oUz/IPxOqbYnFgJCP

pqNXEhr5omffhFDox2HnAbcKU39cm9V+wmU3XAigf5
i/mbCATvIa2Rou8SHrkpqfIT3FTJQrcq46oK/s28fCYQG4t+UjFl3nyyJOKuE3MESyMic7vlAHky6DOX

LIRFlKIrOH+3XuLd3RVZQ76fVqR9QTu6Zr8Ll7rQhYKstnf4cEv3EolJWaLYJcWh2OkzzeHWt2louwT3

cqLSxKTXRXfh89t5lZppOSvAkcs+kOdNBb3JrVnpti
7wVsxZ1q2YzAaWXfMy3510NqfdWsYnv+qbZCM75nGsBRAhA8SUeAIkn+PhbGj8rNX0yAGwrrabgClnW+

HvtAXV9+r8LwRf4qJcSCnmLgLQ7SigjmRY7mfx/h5isnQDMTfGHAf2gtZU4ah8g6n81HhUZd4OwUUQrh

rRMQ4VULhD82woAYUfXuqwd0jb4gHA7WoM3DBWu3bs
iS6dyP84Tyd5YAiZKSH8RydP5Yn9BLUubyeeUq06ukoqHfYO2HJbloQprhIaKNRy7CjTXM9tXohJLppw

Nfu0oA9K4fnRQ1DMuMGzcetwysNgdd2FlJ69P0pP3qva7/P1Q1Jwup1S9uZi1Jp2seHlmMjTclYRnxfq

kHwdu9JFYDMAxs8kwNkJeDaZIQC2ww0zXd5rsY61Ps
IKk/n5/4hbdn576FbZXWzldDr4Kdhwq3XzvRZkF6ZPe/uCo0EYVh48vxS3+SnoS51qmUrEuhztPq8/J8

upvWQaDlMdp+vTpTl6ZgFZPBuvvhHFpl6TWjS/nR4H07ZQDao2LFV6z813dTeBoHurIMONsUk0wGmYQ2

dQtvU657HF03kG8OWvItyDsIj3F04tgxxl/Clov/Cl
pY3R0MoLnPB0fM5kC4bqjc7xjEeP6IAnNd4Nnp5o+I/WJO+7hWcRczxiP1gHDBLMsb4zHPm80UVB/WC5

1eKCb6lwmoyTMP40AkvOES201+Agjc/DJuCnLcemdTHh9iIvQ4GKuXDKPo5qqkLiGBy99kwpudR+eHY8

Q+PFkxvc+qlbXLlI3ol6CXjYBVuFAjxuM6yecS7mRk
3JEqxA/MEDI4po2rkbkfzQdPEGc4gfFEHZoC92PL6dx29DovrceVnSgfwtNX0HXPIiH4AyrbprfoLlhn

dOSQQBkWKws5H/KVp2+VmZ0qayWnuEqZum48NUCCFOkyn36q47EnXkbdS+IcHd2opGAr8HAxuGkPO8TG

qn+Ci3qbWceximrYvB3y7+wwVVknNH55IuwLvNjtrj
nq3EKzaWBH8NhZhe832IgSuZsAz7sbnQzZUUOaPl0X0ode/PEOkVLqWQomqBxNG+Qx6lmQaZ2+XvexFh

ybnQ1CSsFQ+vSbo3je7kOoCeYTJdZSmr/XLwmBslULz1jcrgVnhDOI+Wgxqd6QuaSlgCnk16TOnM5Dku

smSdRWRZKjyoAEJhj9D1z13PGdF8n7b19IlY4d1C5W
TP7Jd6HFUwKJWOyZfLe9KPkuvxqCYThSm9z2A6dzMqf0WqmENdp3f2djLWwcl12Z6/erfPtkNSmZTw/B

gEmH5c8MpoFpQMzskPW2QpJL9+7cXnfsJk+uSucI4MOwdhjPKSq5B2ChQU+mjIt4AbSVVAUhFonrdkTI

DpQ20IiWw0P0A8G/As8T5M0nA/chqt+tljWMpWQJBI
v3rr0FYhNIc5rz1SxXRmiSGVXHbdzgGQQBhnl48+1OJcNb69aMvWQsmtHhes9dTwnpaalNRJ+iPt98ki

VDzyo0wjUAF3WKbm2t9R+6ezEi7DNRP/nRSSQRkvI5DQ3eyuTH8x10H20XViEBzXYyOvdt3rk08SMFxK

MU6xQmFd0BfdAe2F+kbAhaTRnEWEI+HV8JWS6sMAlJ
62bF4THnpQ40nz1lqvHYopSCavXdqtZbSV00yv/bZ5Jgrq3mNUqTTq1+jzNQVyBj690vS6dbTzoccLeo

OPYJEhpWnT2Xf1p3hLmicsO2UsKxL2i7uJsOij36Xz5toifNm52WiT0amcJ19nphlwnaxptCkVOsQsMO

bdgvmT+kjzsFN61uLsd3m6tqYWJ/BtxpCesrGZWmDf
mbCGiskvT0mTtw2sqGZSOqg2kumOrx2IH76i2zIq8u/lZ7ItkG3cp/665cxd059rJqUeAdAW3GJtME4J

GeXDlGCblWx2ky64MRy8ukG/+7mQAGZaxFpYj2AOpV1FrS2O2W2k4x/wkv1Rv8uQ0901LwvRn3wW+rQu

CApjJV57cLDXQv2fFpu2KSeoujF4+JP9U9bQVNjGhi
wumIZNTXmepeb9d+d/5LdZyeepk0DwoXbP4/cQ/t/3PUjv+Gcohf4hbA5idp+tHZi+a5npJSEZdkED04

R+juVEtn4PST/2tG/r7MeRvuycq1y2XE9ybShRnsTnGOIldCZ6SeoDpfTf2UF/XxrnlU6WIQpFbUR1Wy

KS+HZ9vCYYSn2EFV7D+OtDlH/fv2ZVak7l4H2GHN76
VWW6CcEpT/gCNNk3WFk0cWSykqPnE2u5aRopWip+Uv9y/3L/cv9y/3L/cv9/4vZWJTv8ZOKsmGockphn

8nsa/100K/0gJ+xZnpQyPyvH1tLXsv3s1d1NUzduHiOFD8S+YWfMWJiOoujJ8UiLt0fekNlkkEPh1Q5j

O9+bUXdXDd/avorkauKZqMg6XhprR0DK8znRInWN7+
nZqWQR0nzCfk5bc8aUXao4LA+JmPadz/o4cl8PRBa6IIo1OgZ3rrw7k1BA9CgfkhF/pHyggVQey2881S

U6vz+P5Zm0Xv6RhqV/UlsX6ca4uIWRqARId5FSISkvQW0ITYoSVED3SUbhOerj7XwiW5KgvmN4sRVqNt

augb3VhJsGlhuqqrxMJQ1/hnfj8AydKEF/LeL7iAgx
9pnHnurxNjnBqnjzLsX6iOsndA9GiJdM9Jss5vevxfFC1VvLf2AmoQ5goU8ojQ50TUjCan9czoq7WZmR

XfqVfbHGqA1OkODDxIvFXLL2dQXVmo14U3qT/POo2eTmV2AOsisvypTKElrxMmsL0arMcVuvXLUXv2Jp

tMZNs8QFua07PudQDQD21U83jrB9JT78vZc8tyqY8j
spukqq1hcjqH2XdvwHfCHkYKEKXDWSv+pVxTWSQil7H8rkW4vfOhaO+giFI8vhJTNKGysIoqRG1vTT66

KQuNkxUyj+0JlwMPWxC3s/L9W7betImA6QcnIeR/wsGEqJOq57uJBT9hdONwcRx769TimSloVR6WrBO0

GcRpQUiZurofBa40COyzzRgSy1QMt202IDxhUTB0K6
qq3/hzXaW6CGNVNbpCqEyvjQK30HlkLm8BphRz0sfZwDFnWpDfKyGH+JTlcmTFLK6DlHKkaCj3IWRxA+

ws9zyTB8vcLbOL0HRZx3DisklXe+cxlcbUK1teJ73pGkRwM5908dYA9XUD4wZuw2+ryndoWCxF5iq8cO

zA9Si56CMD+pIj5ALJAC9/x5S8pSeKAENyVb1IqVi9
gsKkb2j2DkEBZxgCQbKpxOUmCCSnw32ph1Osa4M3jy0seVtnQz0ypnWAon0qnBRDEw312B507bzxABII

w9RRr4Dlso9zponemP9SCzsYQM3Hjg0i/UxKjxmckTE80rMh7sRap5u9StoVq5jJaKYJeU0ZkVAD5v/E

hU+6bjUC4k5/AGppw3fnjv8ri/zaOzdDDhXcOzKa+O
HdwZvLi4U5l7OiMZ+tYDdbjfg7SbjkagEFMtSi0afNPMBy+BQmdWp3eIvubQj6SiWIwmiMNWg+JNK2nR

4KtDnrQs463ZvZxtBN/qZOrHCIsvphpHHtzXJSYeQeRrCcnE/8ZvimPxj7vyBjpzp9mrr5bs0MYQTeK5

lzDpE6e/BbhPiqYTHB2zbHVmdZqeBkqbsYzy7HcDbE
RcRuGhPiKRmQeCcZ/PxOcmGQP7Ef0VNMVz8nZWOTQNxzVkyQc5xAQRBbCVv2ZQ902QJkiDUmOfF8LNCC

jXphHxME5neQQCgx57va71Y2B9ynavB9T9VdJZrT77cp6QCUk0lmuXCOetDGzVnxRNBoAW3Q9LnxG9uQ

npxPYouy27D7HlKtXiUk70q/68FTmcx30S/dWclSKU
iesZv0hMFd6UAh2uMSRo2Ex4WiDgz/5Jv5mimmzEIb4potqsOgdW/A76MUNWqEXzCBMfSCt7PKDiT3ep

QsNFcdfNdvLOMhP1H/v2Wfi70H8iN17aXmflxh9+F5mzk1/c78+Pv6oFe6et2CO8h5C84r1yW0y1DPX5

OA19SEhqEcvJt5OtZyNEhl/jneu+mXNQL5idHv4QuD
Fj2Xhc3Lq8o4TsUoaZonZvmVvWXxqURrYyYXE3VwWpviIBPwGfrqTGdGNcOELBk01KeuZCDIwqdqsSie

7ZMASMZywQ2fUptxQF43J1Y+74zKJwGtNfXOYyUaA3m4ux3lAUHxKytSKfTVxf+VDF9bAgmZeWvRejjK

Fy8R9gPw+aUjfut87RgJIRXXHDps3xV4BvlvBBB6wb
Tl4f5hglboyGqSY/ebcopAT70wd8t7bieKWySmXsb5ynezgHrkV9zSst4JMOQEosuFLYqIHm9eNimb9C

1XIBu0us4an8LNEltPvuZy46Xe9bqDiOzNqc8f80kzCP3o8Ajv3ljgMmVvQK4WO8V2Mza80VJFpzy1TU

w8v9KqEQ2C1NWyqDxZyUePan3B+BYDhVIDWfMtk8Z2
kJSOqEhRhe369taFP7Sc2U7V9yCLlN/5ayF9RyQeMlYy2K9FmebqtfIrDtAhnioOyMLI5LMwlC7lzGeO

ijdiUYE9Bn6EBR8RR6gMxaLCa9YwkNujTZhmZfiTYCPcRKQ6A/wTPEZU2FJY17N5VYihvlYMwSmdbuc2

eHHj302jqBGj2AxlKWjADNJSeGL97ds9a364QU0vwP
TRlmVsDs0F2X7xclwXiEP64yhiri1j45DK1MU99v8lH/Ys0+fSnDymsCd0ppLNI8kURywldT0pp6nsXH

DBiMXUWG/5WJD9vdgDiSZ/2g4/wWR4w8+Z9/la0gMI2cReeRadRjoTjXQF4mVGK4iGJmpFJzBxnVOoa+

JBE2ainxUfS4q+xv1DBLRyToK/VWEhiW8pgQQnatET
KGWo9OjnJTKUGC5ocI+neAh/fUX2UPbH8qHcd6+TaVhxx6t/i4Tf1/bx76jCHTjdTqkKycGJl/PKS8Kp

51oB4vHtfNYur3mblC7J+QcIKOWL7O/uqiSK8VqmnaO8dFT8kpQQnr7dI4MpHZy8hdP5CDTbs1xxLdlM

JI+DrR1xNZ5jAn1AIFb14MhgTYtu3fO07u0pez+kx5
I2Ly52iVjVsW2EaIroDW4klXhL9Umrr8fvg1z0fGvmiBGP6zldhpUcQynR0dPRvzP5l/VQxb92/T038e

1JOa9Jg6XSlPOCuFwNsWUawyswOEq0FfzlFEDJunsNYp5QxFHven16uxmKfW6IIfe7No5tPXO56vXFX8

A/Wcho3oFVplwmxY3MOdXtw3qB1kFHi5KlNZ8UdkDY
x0Unoym+LvQW+jj4ahspdt50JzdLOHdmj8UdvkSt99CHiSTJpO9n+FAqC+UGtSF/hk75Ps18p8fCgryX

3bpd8k1EX8BSWrx/4uxupy83M5sMWMFBOGSPmwdwdXGWvafntzyK3vuiS8uCz0fytpEICqyUSpZ0Ap42

LUOwK4NZv1SN/TZBY9A00B58nGrIOMwnkmM9AvyvPz
5nzP/XHOmpl1BGvuEHOTL0H0Z1CSAIMToNmVnVwMsno79Gssn2w9S3yS1KBN4JZ5z3cPCM+LFWxIVW6c

8nMDs//dwXHpG+QXdBjjI+vzqnJjtZfZRV8eCt76Uf8krZf3KlRRF0Edvxcp5GxNvmA2c8IjcHGyiOi+

lKYN5EWrXLrraxPstYeqzX1KVpQiq5AdeG7HR+J+SV
YPl2FwDt7XoVLUBP9JKm3O1DPYaTOwF5rXKvWqQFw+51EJOTVfSwdgiwZSvcAeuSnFctvUpBYyGQIjDI

arV6TJu+0RcGZrnsK0pKXQeguzoWccYrSXIFATeo7xD/xFVEzhe3oXrlW4W5wANeql2u14+SO1zzss6P

55jiq9G7ute2W7keppaynohVCu09pYqozFa0RS05bt
dsEnFcp6zS/KQ9wZaZozqD8VYWT0YCRzc4zc8+E+xvcdAzmAsW29rZz7mXvbJMpdz1b8orjyL9H/Kkfg

ikEvOunf7A0kfSAbbFd7yV4zT2QAap729HPd7M4hTRZVivOMh7fXD5y2ko/mDA7w6k1CmnRuMHjROYXx

TN/wv76KkEbCKVgRhE6aVljSb0lZaCFP9qRsI/bDgh
QljoHjVsbs6jBKvAOwk+j2RO1vnyVKDXi42iOjX3/uruTjJlaNCHqfDxXVmLkCDaytyf+MM665OK8nzq

d40Hb0z00gjKLeWrk5P4dCxTb12OScrYQ2HMUJkX7zVHR7xgh1PGHbssh6QdKZJjMGGWpPLQ94rXd2Il

tuFAK1PVpp38C+ni9jdB2ZYlveFoqkZA0Q+UGvIaZA
TKMVT/SGiW6YG0TPD3Rs8lS8qI5LzErsT9vBsHzoBY9Cv3KrfQXpUsFEP+70os/8Vf/MVf/BeD+evteJ

sXX8QKJSX01fw9UZOrxcIxy2MXLb65/+XGGOHxNGf/vx4UVMh4Bk4XCRmEOhQzVLj4BRA0lHFaA88Erp

ykJnmiSIttUmMxeIBjV00jnyrderjWJjLpUFiGqd4B
QQtP6dQ6/9LJhxNpf0mr2hg4ebNmv5qXTYNhqBHLEmQdsN6wCizrwFhv2+CuzQgmkMIT/24No7x7JehN

2auNrzywKIUTM8Ig37KdAEpyjZTPzu9J6M+Q1vD7gJl6bxzy0AvHmY3d1bzfVCZHYbIsu4rgIfuyQVrl

CToigEJ4BJmMeLN/lLAi/rPcNHGrULePcoPZarVje8
mWpwL3/WIBav/GrVekrQaAYjFRHdmc4PJ1AAOEF53tGF+pENMREGB8fmeXMKsa1w3op3LtcVieXuD553

D+gsSH3fzmhotRrqMa09zFdAIXAPjbHMw7VvJp2aOkmhO2B/IVELPD7m8/8xAoNperfINrgmLm4RZ6Tv

vA5c6zZhNvHSJ2q/lvbB+QNrs8QPxljLs8j6U3ZE+/
SezjooYa+WFbJg3S+o3YdQS08t0kthkrX1OAvBrNcPsjbGOdWL8belgEv/AELn5wY7IWtIH7+oubEynS

HX1qsPebvaKtD4TpMvFkzTQ8S+P1G9jaHRZWuem8RFwdJal73BfTGTbvpy/+crBw/ZhuDn4mjTaMuyzp

mfdnRHRDQa3gWYeOiWupzk+oWh0U3Y9f0egQNYpy+A
hDBVhmCMTm+hOx+cp0Wjd2GohTxapJTM1nlabgRVX/ur0FYKgpsANmec85WQmzEXZvEykxe4Mm1ByKTt

D7+jZSvIjiStrlxLHQWY4APVNsY9DVXMWwVbJ43dPG10ErEox+hbb2ll4xnQ/qjCBa8y46Zt7Zr6Yu8m

09VYhuy5hPLjbybGUQeEKkh7nR21RObeJVaf9WE39o
LvMag2IFcu6H8sniCBMsOhaghbtjOAZrxC5p1m4EXF581K0etwSr1NyjAID0z3Qa6SS4y6xHwyLUjYfA

5oxN2NjaGesvb5ConJFq5pAqe7oxzvcXjX746PFqJwDnWONghF8th0Z+/kJAYMqnmb0Wz0wxPHT3vhfI

NnN2JzeuvJLp0Q8yVXjDiJXU3EIxKi7/IfSICaDokV
MYpM+EqEtHo1H5D+BlQnIlni6Vux08QhKSK1D7Zc4f3i7KoAi9KqWHL4bvMGgHF8QytY10KWwzEd3euf

JUIjMj28qKSen9FpW4Y4uUxW0gu93Cw9+g7kR5hg9OacyZ5Bver1BX2mGJAGaaA+/tDuzO+W4no4bmbt

/7SjxoKXDMr1nQ8AhGjLyiQu4+3NhjuK8eW42RkbJI
1xx+VL8vNp0ZtkOJj/x1dmrsv/cImGULuCSuCIjIiQ+grVb0Eqqap/zY06Iom/Eqd//hk+sNhghQ2YLN

8PSxFtyHz8dqrqxc42ojzJMW3QCXL7QJrP/8cG3oYMutRnFltsF0Eku22vnYF+k/0Vbr6i39cg0igH7z

nI1WzGa6RzbXKQORRD+gjVw/Y7ZOHfIA6SM1+VpCJf
diEOkOZfOwoO4tArT4PN6oLd7zIMueMgApK0YXQ4VvqUco8Fh2jyYgEKc7pCrROQ3okG4NwJEyh6tVob

0NfsiSnGGuRAxuLwzvBHaKbjCw3VWIJSBmvq6yKYQxFwaVfkQHasCGSF285tEO7OIhUZ7paa1nYko8DI

/oSt83GKtJXw0aRbfzai2u2jIrRYlzjZ6k6h7GU/EA
yCkMHUB+qvX5+ABX8jx36SjeikxUky+qphg9B+XIJNAwQfPtsmJoQZR7cU9zX7k+juyPdteZIp8UjqPl

HPYzrIwv4xCkEK0amyOf76FV7VrRufkBj31bMLx7YMz6OzwMdKd1jCjl8QTzKMvoH3WelCpIDfKSdByc

DbQmSBdqtwQ6p+SZa8imOIi2tMaV9yOzbopSvfa/a6
VYcKtp8PkGmEKr2t1i+7p++vcAVeUaJGJ4AnV9MDxrkvxpoYS9RUT3KXatM/58/sSOt1lKKKKJCAR8NL

8iC4xTRgTFqEO7e8J5neNJK2253/VEFMyiuMQgEkPV8iNLv8z2FS9582N+6gWbUgwqF7XVB1aGPyRC+s

ebUYoZ0G2yqzd0uPJPcc78LEaUBeLjGOWbV/8Ztb41
kwmHNdiCJN0fUadeFyP36aVQykuCj+F2G8SGST+zXkWqk8ZMY4RrkIKRytilKV3TjEjNHt43BpipwH5h

NBuxwffPljqrPK+HsoafE0caSsAZ23SB5XO/IAsSw4VJU08QCptPruiedaJYLK5/L3od0sOXU812u71a

CYH0YtZxKs0UcwCCJXKxuyJPwfbFMbk0DJHfKC0UJP
WIpLiLuzPLGfy4H8DN1zjfg+8/8J6x34SJJlJJU3k93Zo17vOTiBmTqvoakIuBy0bknn9nkjvPUU/WAM

vwGB8KqdewFvlMcsf7cRR/eQAszdPmVkxjS9Z1vT9AxdFHcJFTUW0772X1tc3/5QX9KGLfg9EW8RiokV

13p6YmtfnHxz3mfbH2p/GqYU0EA8v+wtEbMxLlgmqL
7P4VpBT1nSA0a8dpMd/nouqlYvGboSnNsEEgFwsJes1O3WqTtupyzh+6UGDQU0/sMD833W50qJsgabFd

fXjXDlKsaN2ieB1jQuOzmul98c+Op6B2mrS0nWkokGZGzc5CZBXKFDOHfic3FLSXVzanSl5yuB8Lh4bp

7DVZOKt958ZPELWVJuNJEta6Nf6XMVplo81TxBVbDJ
iceB4tB5QOlS0Wum+E4ZxVGX+AKmRztZ1q7v0v/iqf9LkcRAsb2qYM+KaRjNgu9Pwicl8SNf7+Mi/w6P

LnlvuPtJKccSdfyM6FCIcvDJHShQzbGsrtjrfaQukFfEFgv2IteZWFN8osePeKI1zjafGvZ1hEr6WQsH

yOVS5uR6xVt0yBkoHfxV1O9CJiLSn5eUo59MM8RMNy
kaMFbgJwzYRlM6vNJxLlD3HctVlqDaa9VSuFWxPEKAxYzqyNkSbc1+NBJNzBZe5UG8adZxm3u+0qrxDe

NbhNikfQVBQBNLLmYY06SuXzwi3c+wjFaRX2+XI+0GXysidzI6okUuoO1i3WjXoZst3PSA/vTaiiFDaH

aizgFnD/iLF1vE2s+686Ys4rQdqRQI9Cg+zwIJ2ESl
R6NI+TRIG2q0sKLmj6Y9nqJnmQhJUZoaRzltlY6sI3/CvDg4kkKXlwgniqR4bvCIfeKDOx5tOnoHu/s6

kWIXsikPw3j3UW3KH1io8eQUdvCJniHdNH2qzxxVpTYF6wd6MyrvoHYltuyZJgPNkzm6EbMMl6gpJ6HD

fw8Y3W+lyDOKxBP9MEHBbpfMkMRYT8JZC4zm+rZseC
erOGjDBXr1oZI98EarIXiYfJeY0MBzH8AE+nlLuT8YgGZa/IR/62wRn1cjU0Mh86hoFeEtgALJ2kGp3/

e2x+WqpqxVfvsxoipo3sapJVAfv2O5v5vbzT2y0YiczMF5eWqV1nkbK0uYEHre2ZPaFqWHpOz/ufFbc+

jkbLnT1yuVG5hdsmlDqFOKLUipJwAuu15qRHngiJfO
hzaPY3sWaSyKxpIh8bWam5c/bufV2AA6jwlrUtoJoWvEGQwPctHOsC5MI2Fk69dFwop6xmPeoC3wGg7+

dXh2h9RQ47u97DPQv8FDyVLxl6OxL/o7MFXncoV1jNGLv8IdPq0xUXw5VTZFFgzVh8fSU5+XvaGdvGId

CxYT13X3A50SEER2fucNDLPEqJHmBfV/nI/ShjvJtW
3Y1ZxBxNDZpEYV46Kkt+g1f754UyeCeJRTMrCRfC7uezBSGh3ga4QE43LUDp6FDCpSAGCz8TFg7NDGo5

d+IA1XDgUcvE9YWuAwlVatvZafMoZiQXXHIOEgdYTHn5sWJbPdlBUb8O9d1Yio9CoM5587Suufw940iz

9uuO5Z2dWX7YlEqk17pjp4VYsjDIJhLv+9123jjVmR
Vvai17j3qlYmAf06P3Cs+CSS3dYmLsd9heD4vl1aSz2QWhc/ziex/yVj2z8w4g2kAuxTPHq5HU/zAZdD

bBhWS0q1u7hoECwkB95hHST81Mf4yIgLVNEOkzd/HGWZVLcCc374FiRawmHhnRKgJsIrZRhHWMuxX5gz

D5Yl/HXH2w5thZ+bANdypXw8cR0ax6Kcdz6ynwL7yx
7KqsvhSXNT1uxzQS5Gk7eHO6o2i8d+/ZBUlMoMyTGF//+KAPKsnjNOJe7zkBTTRijMJ7RbOABOJ+HVaj

Z3iPRSU1Z+mV5tchj2EhMbUnkWlXooQQWsTKYQzI0xCI81SpBqg+TLEovuw+/QSXZO8MAkMn2t1k7egm

FH6JokoPxo+JntXGAPorURukQo/tJSHQ6NLnIkaOmw
TfJddedYhPuRhCbOsj/cWl9MVFYvoaAfTmcLGkkKZ+Nyv/rpUxM0M24FZ/8Ie/01zwHmbbEujR3nL16E

3Ps1GzFCZFPJ95hl0JxrkJpphEixKaYoHNUhRgv0fdRT3v9GV3mOn0Lq9I0Wk9IsLx5BMuUzdYM12OZx

i3MyGxcl6kxocNLqzsXqHJ/jiRfLf3Tm0MAYN6yXFG
cMR74miMz2wT64Y9K12onEhGMpHBnEft2Ham1Go0h4mUv1GAOFSVb1xYRHHBoh8JoNwWoxHs1Gz7lubB

7hu6qTqAV3DCukku+TvYQfRZNd+jXTd0L4s6Af+3MgVKcqJ6iwXFgZ/xeA0JjoJA5PM5e6KGDs1N18sK

VOzmbd8ANhsQc/uyglL4QG6P1+Njc4fxYNoa4D1xFm
8xJGj6JYNUVp9Xd/u5xypHKC2dJnCXefDVHEwzXejpUlx7fG1mGGdG8xnQeJGC8I1Mof1m69Emxdm4Z9

XBi576YVPydwGvc2ZX1kmmBGGs0XjOuEfq9Osh+la2YKxYk3ZCYXLqORT++hlsDubQQXwwzYT06UnW6T

LCKkzvHtp5VIdbTJUsOGPGvzR0kDse45TEKqyB934b
r/Hlc6W8dITA6J/Tw4/jb8kX0Jq8inxGisUkxmYqrhnstLOZXCGEdjIw4N2kkhMDa0Fbx8Y4/Nmc3wW5

CIWlcNslzp1mn60Y7DzvgCIoMNCs1TycAQXAjYgjknJdz1bjgu5/qMc0vtISMXGlT2jPoJeSn6k3dMFX

qRJ7UNItUL3QwjmG9i8JD5ph5WGYp+1NBqlMdu20IL
nxyWctAxkblA6Py71YqxSosf4FJEClM889CFo33CxsG5rt9zqNe0Kvyl2aWP8jGphB1rS6svX25mCs8c

Z3CUxMnWXlRnh36HIh5PA65aJuvEzoBZyrh5tRiuhZHm8QUHqKAZVShIf7M+yW6fY6wBPqTnQ5/ekOpF

M2obxwPXcz+8EcJFrQYCww848derBJseUv3SvDRTqd
3/XWtBnxEEnWgH6LVhp4QYe6TufL3LZvhUWRBXOhtb3o3wuNuyhBlb/2+LL4yHPcdEie5RIWwmKNO0g2

g0czun+lhLf4NwHq5XiyzvDveLXJT+C82cLfHLEQCXJktuwvscF9JjT2JK4EOAMb9LKDqRr3rq7X3fjb

NegJdM00Jw1Jdk4UhnLIosA/6i3Po9pOlvlxJKt5G+
QyBEwo8D6ukwf4Mqam9P7T5buS2PzIpqCk7/kceFNypBgvfsWl2EyZvBec2l49Y9M48E+8CGFO7AEC6i

f+Esu88w1LreqglS/Fd5cRwEjMnwBISVMazoiNC4sMCIxRjBFazqhqrcjtWwBMqzSSnznB60NhTUXxfn

vLnYzISZUqPHX8/eYW/qaiUNTbx6zuOvY2FG5/b8tZ
9huZLjlT1fe1s2laVuJsWkB8gAfnqpp1vbvmCVT35pQ0qGTzKVrFzvSzPOkoQXy8udcxnR30FRF9G7Ux

oqDBx/hY0bwYaThOJv7MxebtQH1pZLGF+YWGnUZGRPN0tdg8BgJdRai+RppuuhLtuQ70FKM4/svI0jgH

ZXlILVGozd4fcLYn2CiX9Iznrexn+K+rz0hY3GG0+x
rLB1yFtxhMYJs0AyabkqPgr4BKmSIKaymK6m6ZvYmyIEADdO/Rj/e1BGSn6Bm1iwn3h4bYUmxYg8hIUi

prJ1TGSkh8cVvRtMRswoO3604Qi3ebq5T+JY7CEW4gJOGHDSgAnhZbxtPzPkrx7TneCu55riOpBbaTf6

SGm655wTcW8bkAKZqAquWjAU/+sPe8M0VtZGnZcriB
8RaQ9+QqNZoZjkpOyKimPxOod4lOtVCoUUABYueja9Ahw6GEpaB9cXjl7E6SOMJ7odHgAEypzTjJTzdT

lD1rmx6AZTCYkvTaCpcYTG9AqoruqBS51QS2dupq1ZNt7RHynmp3L8b8S7SBDJ+xc9T+ykfx1Y7zvDyY

w0WcoEjUp/GvmsBerc6zwSNFZbSDjK9CaiQBrUG6HS
dtN0GEFexitBO0aVlJ0KYAR571WrrupiFbEDfaULWzAc/6khLH5W43oq843osQo5JfXX9t1OGI9wAbb+

TZgIbY3a2jfD0a//Eym9SnYU4j0elBb2bTeUAOfQWzuIRdFEau0vbkuJ20uClyXfUGK+h9xKYi0DaEB2

mIwJAw2qo4rKAOCTDaIfPoGlb72q9Rz5H5AFOmskR3
VuJYdd+p0evTv+V80veV/tWDCUggVtj0I09ZY1I8azuugzmDRY4Bp8v2RhuKwdgShal4osAxXyxs4qtm

B86GeR1ApE7XMbAD9YZ33tiNTVSWnhS0SEPUDA8neSXmrNVmo0Hv81/SY9uzaVmML+ktQs5B9ADg1pMW

muvDFhQm45DexKvTVTMz8xWLizA2DM/rk8V1ybVa2G
LOPnXPwxmfJlG/9Bx0/zpXy7pqvzwb+Hwr1x6yNHvA9by/IPHwmFNlZA7kf6ITmdORJxydB7tomtPNCl

Um+wRn9me+VqrGrr9YHj65TXawr5SancUZOcO8nP1RnYLI7FQ0k2lqpnkBBtCNvMmuBUJ7R6ZqLHyRhk

ELKs46HniV8xz+vrqFj6dNvKCg4BKW5oZEXIwnkBPB
pc50lJ6dW6smonoThz6PgzsO9CAoah+6w7DkKvGq1HF8UR1gpzyhpVLJv0ehvffv7z5m0Q9dsual1030

yZLAdafcXrYNrDEH4UDx9eD9TUtGuom/NTruyTGr2sqfJ5Q1AT1tV1jJ5PglgLym1Dzr/Dq8ixrIKrQ2

71oa/ZvDlT3K6iBKjbH7mgcmjPQRNmxdoFnr82J7gB
SrT3zOsvQG+qjZiNlhcTAwDcJNnR7U8/LRdIZQVDhjF0zPDo/v3EnKPi3gUPLnyTDP8QQcbAqeSisF5t

Ve0mt//5b854BLhJjpqu/BREIUa9AzvY2URAMAhm9KEB2OwMh2lneFE2q9WqVu68/QrzbF2ZyDm+PUnT

ITXZB39rl6805LNy+bZnlSRH+/H9eNpRFixZkU1VFf
w8bRYMSAZ1w54lYS0iZW58ldBhC9Tzu1xWN7oSwBWMISJOtLFB4vC6fuprOavXZMoktE6XhJFioHi2wY

LrXXTFJvcj/sb1X+W+QEKoqYSZSLzNn7iHU4pRVgVwZ0GsjVZD/Coon/9i6Q8YFLuIljtxT/NGJUqv5VlJ

+UO790VbcX15l42yUDEOuInekRGawbSd4aRxcHBXyA
lSEKGmm5uEdVJs988x0mGp4QnLnCV8870G25SgP8ptCT2Ciqqc9ghRgjEAJ2VGKnsjr8Ruh06XT77eYb

ULNv5EM1ItE/f8EQ9UyPk0biVFr9Ugd4a8Rg5TTBrDxPCXadkTCRulhOqxVQYK9bXj0LC9PoKmJnsLD4

t6mVD9398DLKYIy1c7XblIMIhBK9rLHWsusC7eldVE
tZaFD7esm0G/3kV9wEwO7wFSf64wdGVPtFt3IiokbbFlsSwFNtb3p3r4ZhcCpmG/DKKAo/0stbASWSME

Q/AZBwg8JbGIstnLH49rG6BG+fHDrR0dgK1otVuGr5S82TZf12unv4kp9FK6bad7rJb+4ff78Q79jgDG

nl7s5PeeICVWmTRHpIN90KKtr2QnpjCmPEVqjUKgqV
M/pUWF0kTJMbJIcwvxfTxDzfVN9ZAmed4C+Fbe4/ymW4TLAv9MqyPUL0KK+2+6Pe0luKvl+ljqfasc6B

wRRJwme61rIpcPUJJoUAZ98raQEKJ22fXwa5qZZFFA4ep3DhrDwmfBxeOhG3q6zpcDgb5NMOgVnjlsXM

j+dAmQT732CmLMIMGgeFPH96cD20aiP9kktPG/5DFQ
QlI7r1WTIo22CZMju+S40jwSwECEILgM/QDhv8RBxVMuQ6vgqf9DuzHFMEMKR+tEvM61GnEmz2WT3qyt

7FFeJ1wV6+9iZbBityNgTfQregN365wZC0rnk76vUYrWpAms5v2ow8pLn48qkD3hEvx6OvV0rKMHtKsc

KTpmM+nfAJ8+tvjm9H3+/+iovvqqRsOJRfG+KDjl3P
30Y413YvjF83/xeo7vZOlf+Jjb+SNCE/i9VtyZkqOmrIk2KEYM6PbuAI+7RV/XTi2p0zc3mfT3b0m992

cgppJQKUsb8kLcbTiD21QYO/2OqRKqVsZJvg/eR69wY8IgKqRrawYFo8dBSfhhbutmMOnjVEnRPA4D6a

6XEsLeKgJesNdbHRQHec1+Xuz3X5lomzAek6EtzMi/
h65bo9xJeCjZbc+QmQmjK3o2xlu3lVq3znZ/tzYk9+i19Z7nIzVszllZD4Jz8PTcHMfBr+0DAlFTMc6Z

X9MRlnor9BVqRRfR1PU4mWtI5oDIu0rh8B0ghbWRFVBjggtltjRl8Hn+MK2R6UDsiPg61W+CvRloodbi

Iv/TnuJUuctDCaOFZRBv5ioQM4oom5DX/TZjt+6CTq
fMuou8Dum5x2g7yWq35mQMiT9wrxYg/KXr5ZO5Wr11T6N0VmUt8tUwF9A1CA5lQf1jdbedb3ZNN9gW6k

vFzAVwLR2egxIMH1iPsbfRRJiN+8ZYFr9FeicJdROpiHQP4+KVHiCeJWSpT+NPFWYi+DXb8OubcuetQL

osUDbmEVHW2VHc5JsI3fbjdAaNDQVrs3sczGHAipYX
PlVAIaKmb541apDyN3z2iKR5X50Fgg5mGkkn7whQShGc1jbPXWZKOb3kDRosvCjzimVN9E04DPpSqZyv

t5nUwZjWpbzMxkuOkH6FFMyaNCF6iduAhuVkpOOLedXzRrwSeXl++BhLbJ07EqQZb5Ne0tDSgCqNhwGz

Lo5+eOgDWMt/Idkec32KkdNd5VIXJ6uLTbGTU/zFbK
m4/6ofWEtMurKVyEiwr2TbYfuyXa2j69PkxUmomZQ2DdGEv7S6mZPARW3ELl1iGVGUfYUAkiZ5wq1uoB

E9eNYuYH8V5Pybc1cSqA4kPmbkHfg17Iiw/zd8tDdEf2z6zrXL0vDRksYP+w1neCfFHUScqSJB1RIxhH

rJsAQgBo96onUQXswO2DTL3+yDIsGGH7PEjAnAPdPf
FCWH/Ck150nIuZkEeo3McJtN8m9uO+ur241ZclHzgKcOakTEVWO2O+UPpIHn7vR9YBL2Am0OQI5pVwIo

M6vdSX7bbNvwgKKnLa29Kc3IpOdXgI9RvLfd29H/FdsDNczmkCJUH03v/oC+50QTCuKM6YgaWTAgbNaI

ytKlBtW8zyko6GYt0sQ0WLIF4a6uhovf5SRXgZcmqO
sqGh7ZWI2nf4q8tMP8oITn9FyT83eVmcIl+yT+NyWLt6KCrik5JRDgLJtFnHPov0uZt2Bx4jCRxYqe7M

LTyDIpfa2q7jgR1cAsW6+K9L1ve/0SGDGta5ODfQslOON9g5Ad8U+BAyPG8Ufjvh3RpsYr1YUkFc7tKL

hMiRwQ79VgiGug8gmD2mWnEUyT2LMuyZsQB6NbOtjW
7ChvVl1K7GBSswUdvbHoYJ+r+8dCekQ5iYzK+G+C/2y1bg8hLyAY27TvWakahRklU/piFFOoxZ6+rmI7

3gQcOKcX5FEEjFE3MCG5BXm+k2C2oVMCG1UymLbkpxWrrWq436hprWq9vhlFR5p4E8jvuEMD1KsB4FTe

nAjzokH9E2U/Hkm0pY7NKhgSgQdXqCzibLSZ9KG7OT
DJl7+4KA7uOFIpJWFRJf02aUm2KNqnPVIaqFNLcMNcvshtcv089x/eo+BAW6iIfIhOTowIhwCIykVwYu

n28hmNSf/xmrLWRrX/xtZP3HUkSlae5EWqGMyjQTxiA7w/NEZjyk7GaNG68c4cr3VJ+xVXq1IKyh2iQL

wrHzLod5X+SST06aXY196lytsVvt6glyPv53+Yr/eY
42G4nyuSCyi1SgrGrg+xqL4v/tYhvHYzmlBFX2xbQMTbqxW0aOMsc+ZMhMvPWVT6Qds3p9BDVFvUraWB

Cr2z/eMOZMQL5eugXiEhec/FTjqJ06iwHj3uOJeTr3Uj9ZmmqINPsDg7RyKr2iB3fr4o61gbhc6algCm

Ruh1VHT+tbO+afnK69JBtALonoEtREWHsgesgMkeiG
403Z0viIabXgbPdmuj8LD6mUavbxXPklcmb0U0z8iOOv+a1rhTyX4zHel94JyNYVAhn3h17/hnLXi9QW

QjGNa0JK07NI2dt3vsJCsklSoXcExtx379EPbPpVuG4VE/QOj3Wb8+7eV8VTOPe8jxudHnuZ6ChsnmZA

V3/A8hM95XxWpgwSCSyhvpWNrlsyA9VqlOksUrEtmP
kGl8iFZO0Ytn7kZRZug554ug9FJ7qWmr2yCLfwQd5vzGeJIrylXccjgeEDsrUnrp/8W7ZlveM/hBJFXc

NiVLOWCWPZL7siQKafjhn1nv1RafLxdr1sppgkrVpf5b7OGrsxxUywiK8vIrI0q0INf6TbAc1P4gIZGq

/b6tbFR+CgoUIM0BSBGFqs9rlL3POyKslwmtJDaGRN
eSFLnhyoD31CP1TTBoRk6olw1XjKinbZeUBztxn8svjasQH/rP6S+XnMALJcrM2ySFbXbGbgnqasZriF

2Psnqvu7Qg4Jyt04YLoR6h8IuzmASvEl5BtMr2BTiGo4TPF7pZIwinquyEOf78kfOvA1mElPHq75uNdg

og4fAef82e/uPXpi4TQziThpmqpwW/QN7qS7dsmpQ/
kXr7fnbvQpgxk9S2uW00Vvy73+KYXIHabeFvmrJUCbU/AKBBn35+hPCskbNfc1r0yk6iAIqhjfWMVJHM

GSawLgjEKCIaUjxuNkv0CvUWfHIefsLh1J0yiAdAbTxIvQJzsm+f8UuXSi5RhEPNyg5KDl6ZjCsB4bfU

MwFUH2xdNEGbF8ZUZa7J7AmWuQ50wrgJ7iSjgO7isJ
JJnNj7QJ3N8r73QBP20EsrPjIQayEC8DYTDegkjRo/UqlqvFgho8M/pogojbIfkYyE0KH2jvzvkxHXyP

rTQf4YKko+VaO/Mfnqa5eEbblD+Sx8pwaB8xvKhu3BAau+x6GXo1c2xKYTH3wzE5kzP7ANvorhOhYzeh

STAN/sbuW2h3jgPHEoX8ohrUhVs1+LWetoorh8R6Mub
wFhc2cJXK7RmUiFCAy0S75pOmInbdcfFJkd8FBIe+7Qo5TNwsQekEbUAFlf0eW6nQnAd29uCX81G7Hg6

1MmgaalFdlkeVmw5J3PonUHKh6u6qmFqQ7P8rA4b2JDUxgsUZ5Kokb/dRv5/Hft44JWDLdTur4KOVygh

acNlYZxvTreQGqtCJ13EEnQe4xw+zcKOJXfr7Wa88w
t7Q9G9CMYA32PoCo5ftcYtNrQ7TIWQN1KmCHNXCRXWjTq/ZPjWsFBeapgKm3wD426UkLjtQDs+XEjdUI

a2B02x+sk2OaxrpGhqXAJOBypTRVTTb8X5lmCT4mZVE+uaXfiXaS1BAnt8/hZwupvQWo+2hg17CN6kqd

Hrn2XCmp1+Jy1D2iI+5rApGvDmEXFL+lvGwOwUkysR
W/3FUSI96Jf0BaB7PKeCPElY1yDfea2KAeOjzX9TsnhyNTndzjXKy+D9q2Fx/TDdJe+N2HFdE3RVzAnL

QCaux2ubcLoAjUoMzyoWUOya6bg0zloNo6CgP15tXYmjaKNGKw5b2XK0FKxp+tJHJR0ydVXmGfskT9hn

nOm77W6nJyfcA31zBM5kmoh3X4IaJvBBq5qfRIodKV
+Mc7cB0Ly5aNUCXf4BjU0FRc/zbtVceotbwM/FsQaMo2xb/1553/1Ng76fjT2K3eLncmteit21HbVQKR

dQHoVTqTh3AmL6jzxe6dAGaCI4TwZzDRBe+xMEU3qQTJHdEHoaiyNBw8twf75PSbiqa0YW/xRQTs/Kre

VRgcRj2UXQM7awG0HuwcsB0LhA4jV3NYpk2mAEbYci
H2SCqg8u+llBLo7uBY/rI1MHP+ZEJh3Ut30QfUTKuTlFauuSP+sbrbQwVBtvDokdoAIUQhHL6O23lxrG

RZr6Zg+uAeHGOl5Blwwy/EX5qZYvZxylc88zMtWW+snQf3yqFRRi+R9WWfrJX5l/GiYIMNkvmSlUpVo4

ahkqaHRz02PR+drvfho2UCGXPlPa9ddCjs8JfutR0M
W1TVursOQYuh/fpmwVx8ZZleudMgvIly+U83z2LgdlncN7fNlfnV+K7mQZJcEI504p03RLQnl2n3AF7h

bpKUTBWiseo/8ndGqwmHaYDTSx1LfW9DsQa3CslP+uiobjxck6ANsmnYtVelwwOx3pf99TbfsJ8WAYKu

cN2BmLnpRAlg77JbjBWKYI4jL8HwVvIKuWPpqB7Qv0
/ULxUrh1fd61J/nwhDqkbU5rgGrNJWi9gVHdMb0K0nlIP414IP2NR2TaI8psCJw3u2gePQrSoyz5RBVg

+iQxe7ZD1I4rV8h+fn0BINyPqJ3E36xRDmYK1Z8/AlHSdTc4JtP3G/Crgd4av8hVEZ/+ff105MhZRUkh

YhWX86uvoLKKjv5lRD+Or9wMe4u2cu9DVkSullcAnM
WthGPGAz59RrfJ8PaXWS3lSSln0sYJ+c98m7huhUXyb0az5K2Q00tdG8Aw0s6uha+DgetdObRvdeM07o

5RFqnBnO4kGpZbbVcR1DmZCNUIjyOd3wUrYy1AGBRg4Aq+/76nj2SRm4bnjCh1MJ/rSsrPGwCcRDoX/X

OIzd465QOmwbBLWIOddKg1sBQnSy7TP1nF9/6Y2lcv
BPHxssVWYovZb2gL11vMxJhKb662e65dZn1mgPNHQ9rkMCwxMin30uGWn5hfu2NdqaVJ2Ym3BeBLH4tT

lMx9grz1Uzd2ZVxN6VoE/7xnr65yKqiHHdpXUUCbxJCQxWNEDHzvm+dyDKY0TtK/xuBvyrfypeu4KTt2

IgdzP43tMdWlAGhVeVpvYUXYuJfb1UybZWM/0uxEvC
eFehmE6expe8D3/M6rUOTWm9SG7ppUnfYN1GkNHaNQZ1Iz5PRQ8UmOscdP8y4DkEXlnxOwRhYVqfgVUL

rIEuKPrIGucwd9Uv2A+vTetOPHgZ+CKL+pduP4PC7pR4cVkx/bM3OvQ2EFEJk/jFuiua4n4o4p38/UFq

5EzVJCWs6/2BBJXrhux/7GLB0U+1aJZH7EVE2ZiV+l
xck3DHEiPir6CIdnnHhttfqRfw7Lp+g6X1w3e0//47Xq86XXIT/M/AuxMObsag+N1fYxL+LT6zl1mKvm

E2doBWQcoAQuj95MJHAeSKpE3wunAqa8gAMCnzq2yjvqyKmkU0YOC9DhnvTMyVgpxfdWzWKBgfd8fQ6V

/kospIXSIrcM/ywZtc+gyLSpTnar9O6Gc+XBOf2qpp
79Njxs1tl3v36fpGDc4KqrgLDmIWwCmeRkmIWBhyWilVjtyuyi5ebl0EYm4GdqKhrOpfwXQeFlrM7QeM

hQtJxK4MKJo0junSNvDFEvEiJ+GoU7jGMdyIgTNj8vuhJ4pkcROwrsVHt1eUZmMCm30mlaK4P9/rpWqw

BeYf6x9O07+S+0JiF5CBc/m5EF2v+ErSiz0T+s243c
QwP1OFv5wNoICvKYBISUd7QMmma4olcV9a8yDl6co+Jt/k06OBGc+kU8eoFqGOnD5W4sUG6skino7Jwa

3nQme9ufaLH3LKKsJ3iNOfa/ts/jjp1YFjP7EH/T/wZ/HQgdd+1IpDpigw9tiapkUfuJFZD08e4U1W42

0luSbZNS2wuLsEny2PP1gMcX0D6VrQpyWBdQiQ+UgS
vszven09rUeaR3BXj09xOpmFomvygR0ojYHB8dyCzG/rN+ju6YtrJeuoL1wFkPXq0mPnShJSxNYjYERE

NMdUcMINRNbPIbRjYamIDKGnGqbrR8tpxdt3pp0bjo5TLjjLmLFQVdRkrD1vW7MA7jBTfkIjt+9JxQ90

/sJytjXDxHgfvkyBFeiIlF85DBGikvQXCIaPsXyBg9
dNp6zjE5y2w06ok/+Jw1MrUkgUk6CcfUaFv+5DidvaJIP729xMZQWzYx3bFP8UROd4f/Fg9eCQi2y7jQ

DKWcEwZ3HHDZULuHw8OiyThKIStS0fvsro6o10c+JU+nphP0x3jW7gqRVyd/rR09BE1UCbmBh4JAxAzc

SshLs+E5+C5NDj2xYY/fmT7uGQEtwMBgICQ1GIr0OJ
FQAmo0khIQrRP4Zn9BEH9Dg1dnVWriZBjjVIpQAbnl+/JgjPAnoU9zAx04128svE0jtynqR2kTQwlgJ6

IE6rR+LBTWt0FuPykVVDrU35xiY9KMVTYf+GpBDKUi7K64XqTbKT54NMHkDA7gPxoxa9XwTB4cQsudB2

rd+3G5uaO/01rK8nhYVWRpzq8IFXPhNqo4oIMCXcd5
awtWDxlTDzpehKeYG4Y5WF9mlnhgGx/F0Phc0GM2fPIMqmoTQqSUXzg27C5S8rVAjF9/1H/WLAHUB9Y/

2YKxUIvT+5jRka0gZkegWoFQZHTtFtqLvcfqR8j02bj+mrc9r1zyaUCwN1pzPVSe/YfUD9bl4PsGdY/A

TPNprhAcDQpIUIc//K66oc9tmTFhwhsqREbFjYiiYG
XG8yM4vUPll3xlpDR/1DYT/UUUxPawWPstef4FLMTjuOI3V1C5kcvZ3fiLxCal3He8Z6nUsgiRPwD2z9

wE+oxk2hLmVqmyOf1YoOgGhHEeXjGNXF6ccntS8mzBNhHej2HZkn1M0KA4K0Uxih8zvxtNz0vn9/jdPi

iZm3v3SgUn1j4HTbnb3G2JRISgO9G5twxoaHx+v2f0
gaiuehC1G6634YacUFtahg/sT+6NpaWiIdKTBuCNGxqURlzJWZWzqk+rWeH2mwmmuxjiHb9+ZnnXsvz5

gCUFHEACKmgLEwGjMvH8BExdSIcxCq6DPqZcxAedzJqlM5rCuXBwAp1ErdWhdYbiDzg0G5YbrPYxzS/A

c7gI7r9qDZPvXQHvczNkb+uR9hNKHh0+GsXMV2WLWN
XlpQNGnVZde9fNT7hlGuEKLIWrSrBW4TonS+4xURozs8wB3h9X+5KvHbmEKypTepR0GixWeEC4NT9xhR

M8Rk7fzICFVxL7xbIYVjIGokwust55F39puKvkgOtGAnY9Vd2WNoPZt3puFpeLTL9YgCsr2FywRoJrEx

2BVADwuM3ubcap9yCxwz3MVmXo9JKYJdP2QxEhwjbj
zi/wLCxVN/O3ros4qSI9NMRP8CEvMRPwGmW2/OkG2NMK2uiHRX+ezau7Hdd7T7z+xGthKbibrHPBlPeb

gQ6dC6uaV09HMAcPPFS2wZa6dxRvj6E/LW32m6yQYu9RGo4W0aBUlgVHCuog58g6jK2uZyPBdu5Ob6vW

f/WTsBiXgVnpj7RrI3g2zJV8AFgVzmP0IH/Lu0yFlj
qiWvzvB09615XnHjQRSFLglLcCFrrZvrczYV5292jqP7BgqPUe/rLNHtsWVLC+gLkHps4PiTyWlNfRSh

fX8U+Ok8Y3hrw6vaT6zww3n+TUWmF/tK2xVI5LqQc2N4Dc99YP+FXegin7Xp/xxSeqKHMRmyNOcq8RmD

NFxeS1EuBH03yYB0PER0jXjv7Nhlv+qvy4iQHZFOaS
LHZtTC1Wq/0oPV0RYJWY732zRZRO914a5fInqkGwnH79BOrXEN6iuwp9HPROU8hSHWd/NTRFaoMsT2A3

KTj3NLsNtJdD8wF1RYMljb8hzRx6kbgsv67HY/1VHDBRHwJL1CIjRxi+h/ohFYSxXu7gOytti01G2N27

qoUmcQaQEjsWuY9mu/bevpsFfVNjlFEktHeSwzphp3
krqE+jbNs/lLbH2SujA5FJopEG8QBG2OQB6YFKoiw28uvmMSPJ79YUpGV8aA3vivdOUrgEeI9q9GJnS7

q+x47grCJK5J05sf95kchoRYL4CB7Nl8l3KxiloOepz1nDARp4y/GFX/+u3FDZogSWZaLIqxvNG+THLc

JW7xLI1qd7m5ySOUoxdW1dBj62Zd2R5ZLY/k8kkr6A
oct/+iY0yC4riOdWx957HGzCcPhHK3gFLZGUQgPugdRWKg+Mt7HWHxV2WB0NEdszcYhGJ4j0RA0bor5V

w6ke/Jkb0oADa5BnyouLnSH86Q1O0dWj39mnmazmcN5x30lnugaaghwB5IFJbgMiUimHrkOBSB+XN9Rk

VmxmVG2sU6TRuCsI8NtI4JjoVuzX92rYpq34bpzgU7
6fGZIboPatqk0lWiP+0/6+aUToOttpqXntfTc0ZgQNQT7uGt5QUdq1YiUGjAfdYuE63QZIssGhqfoqiY

nnQM6wPDp4SxKAoVEPVuM6iW7kfjm4XVZg2z/s+rtvfkmo0X1LDxCySOoC6Wn70bT+9QaQsJcCZDz38o

xrLZNWuLtbHYbDylBKq3wZ+0ooHPK10jkTeJiRA/85
6Di4/b8imC1vSsLgCBELEbMPYmB8ZfzllTlFhffNJFbp/dqCPDK17V0WTSR2JWU2aRxjzHP4McnHNJoc

ZAOeVk575nhqOvnNCnchqpvtw+Kc4HkutI275BhVWiMVL1VQP+KwA+ZPwHbu4lbZtGJgZSq7peOgEFOy

yZX4eunw70cGSEkkxjMYlE8wt2cxOPJl6wPq+RNH0g
bZooehSV7RG6NCJDcArky+XsmTkpvq44nqPJIVeuUJZt5PxhjgWr8fW4eCPedg5A+yJ/77CupBlU+Ou/

LZ6sXkgq/cIbIypr6Ei6PST3ZTctAiFaKJJ/dDjWmAqYLzE+279wC5+5BS3e05XQWBUY2eninWmdmgss

47QkUXt/5E6jAGL1amqV1PZtgCl06mDQwI61CLVL/o
pLWxgN746Xrg7/WJshA8SEX7TofhzjIzbNetvQYY+og5tcwF9uGrzDCW6DpuDRpQSiAaxSdlvRFiH63s

+1a6t3030qzviNLpT3UwauVwU88zKXIYr/QkyB1E3wgMpMp1RBnYQYXsnpo7mhlVdLbzUxU+Dh7pBMCu

bLjjSG0qQ5ewQVEcSRtXdu17P9UnUqTjNI0CZbUiMq
U9RO9zzOnyOKxLP8dwnGnx0/dD6iB8HIU4AGFoCreyHo/yph90RxeErpC4uSVi4bdITtAV7r7T3Sl4/T

g2ia4qQhZrd8Pt+gXMqrekPFL8Opqhfb0Iem3FTFuar5nWc09lEeP8rpAL9Ig6w1hqREZWefsAkExQUC

35O1Qb+a0kpWHyRBd1VA4eoNMYfdtNtWmEPBnapXwx
i5uuetmHgkzJYVenH4Aq8EUwvkE5YT0SZ9pfVZFNRJ0H3PQIb6E45+Ia4MVEqT+ROToOL1k7dBQL7R0S

EKp8b93gtMBv6W+mLenxZcoJdls9su5wkd1Y+1hA/6m7S3dhqnYZREtidc5P8q/LdvnfCFrJg4b2aUnc

pADc9B1s0zhL4VJs3S9tfKjMhVKL8ul9T6AY+Q4Z66
7/giPwbfJKj22n+lHV/q4xkDLhKTwapqWfdFi1zAX6MrKqiLmxsDCOryS0QTzsNiv/H/OuBy2tm0yw43

3BhQSkQ4RZly1QukKYrKk8xWZcOiwDWj+lQ7kSgoK/i863qH+ah8ONLSI2W+ufFZvSubNiwzLk/D8Ak1

PjaavhAEj8ku3a5yho3bfYEfK0ChqBLa4QUn/Xe6Dt
yJFHoV6hoHRc7K5yA4u92IdW2nKuwWB1U74XaoLEUxBz/axTVz9RYJGucm1AR+JMShh0YzFGXaD9niDd

84qLkrjiXR/6huLDwnj5kPKeFnb2F3Fce89JK/z0UcXv5jnR/M4irmphmwCpqYoVHUrZKE5wVqSEaIx4

7pEWo//r7naKyje2qUb+Ff8ZR9HunNrnyx86w+m+Rm
4fYM8cAX2F8jv/3yzm3N/8IcDXvZcEik6H++6TdGQY71hr+39dAZJQqo2P8pBOn+Ph1tbX35mERwf/CH

xUv2flqcQPf8Zq/pndJIwEpCV5cJUFe6O6RUzMyGPX84KSxerOmgSuIwX7n4Gpgp+z6go+tGAoBos4WC

8CRd8pqIeMIxm9G+jjZRcvK35lf5hn0y41+gfalfXM
bsxX6OeUoMpDZzurRQKS+8yOEo9NIVXqAV384U3sCoQptfuqAqckyloNOgPyZOdJ9QlkygyJ17XNZ5aD

gHAhWn58Opbo8//rXlaMXw41cPqpfNfIlyVJnX6MHXQT4sgwV4WDE+HxgOM0Bu3NWm7t2RQz7rkCVs9B

yphbTQ+kJWuooz9D60yseZvdN2Op0pYaDyAErjes53
Mnd+RbMAWavHgM+Va0kY09mO/19tTAX97rkXt154KgTjBy+LGUOW8GKdhYbyVYu+H/2ILuHzcJ+mzaKS

aiqpGnr35/bmORCh6r1HPv0++KlmyV7+AumIcyLEBLRBD/GvOSBzqvOHgGbh+mQj7hHz/2NYn7q6VwQo

vEQDijd4qnQP0apqpM0I42E/KchIAs/oEBgg8/Ku2H
jp2fL7YrCUbE9212WigmNr0zKnIRFLaQIwjpuARCUTcA3Cs1HPrxnK1t1u8zRVCIW43uBUvPjrS/6/v1

PuOlgL8w4nmz0rQ9CmAmoRjX13gXIc8c5BVV8/82jSTz46Jz6FuWELoNgwaenAab/ZdnrWoM+f4aRG7Y

jMorJ0cIhQ7j5hMeSXekD4q6nvwn4QqkwRTmz+/ClQ
30dDpTdadqnF//v7P27dR/5WIf7SvRQv9LMCpTFm30web22PexF+CknK/yRK4FN8W/cr3IyAYgeoo2BQ

Qf0eTpZpRa5AaQp8AasJOF4nZeB+q2EBFtzKmA3VCz4r0tmraA+8iSQCejZm4rB+Z4cWOpfxNA9uypTQ

257iVesmxfjMjii5zEvD4V7IpWbk6Mpe+xvLtwSyvd
Onet2lqVsRmNZAaWMRxazMlPtaJL33z0tutP2DMUUw9hmbK/LdzokD70aaewDHQoBllQeFpgfujdDZOa

/se/bkA/ISMie8nOOa8iyNnq9zL2psNLgtl+FLqvjHla1PxoPgs+cNONaxDyPa/rDzXQ+Jtku4SRSstf

7N6+7S500RF/mFvaEhxjB7xHJ7ZUhjGwPu4GcBTJwF
gJ29G75Gg+fba7RjBZBfgKAcwI2nxMlN13/wD0JpqAhmwL+PGJYUJ9/FuWhEgaQi+C3HCj3FpCSQm3Sm

Us5aa3q6Mq9z3yU6NCpLKc3bj0O9lzgocbFPPqlD/G6GmYHv8NDKajElZibBe+9mu0hoDgL7FdhkBsd2

4AnJhGp9UnK2+1u73xUuXye5QZMNIRnViDOsvXJrl1
vOhtw/+JrzojO978mGVps3MJE/297cU8fm3kZ2lwj+Pkrd03veO4t09kYH9sYWVGKybSzPTnvCNkxOEy

64TXe2l+F0ZgelL4Y8RIDTXxtbICgvidspZ4TfIZ6I9g/kED8okCUG4n6whr33qGilCcahxn3guWRQs2

UDKLPKr+FYzNawMKo9GVGP/dgSZOM3gwT18k8DNtUG
kdaP8Dc+Q8dCFuw2CzNYhtzEIIn4Mq724A2UeSpiLJx8jv4V/2syURlpAa3wyWArmv9VPTQm3DZCHCE0

ZyRFoClGRIgDRVx9wfmhf3KF7CrLLmxaNn8o04nLOGOcaTzYbXcnuW86wrEUqFQohkxft8OkeKnGq6+F

uXF7Hi+RYniWRVQnckuuxzGbvGzo+fv1WWZ07AZBKc
KpNx4sefZ4+4h4pWwxFLNd2rcpvarL+Xv4HGH+oqJG8kMr5Z5zvuJsdN2TQS3QU3AgZh8Bd8D13lvO71

wC0YQ5zqMS5IfNzW+kn9TamkYS8WIIsmfLMKTSRWeCjQB2ryRsJCs2xpUdyE9iJBV4re5EFQ0rjyevFz

MSJly1Wu+GMEBiKbO0BeKWfj2v5JnjNTFvESv5x43J
xo0W4TS3qqpeAqFvg9jJWqqw90XMUyDvyK4Lhu5o0lZHw6XJBjZgZPEmliofaHoTfr9C4gg/gBIol1U8

cb3Ih5e/LiDFDcUQ5xTVbEAEl1qV8lQP0bzo+td3/JN6KkS3xa/wsB9QRyHYdeij93mobZPj4siv3zj+

AzFRnWXTFdDNEUSNI0//BEGyWKTedt7OGJblx+ZQak
HwUgEw3D3kU4VLW6fGHt29jLiDZUj/jMj6orhBBjt0lKtGxBQl5zceNy0pM3ljzfU7uhkG4OSaS6ns48

a44FMuXW+VnlZY6c1mErcfnWN5lvDtIXfnRLeEXgloquQVG049rROqnzEIaOl9Jrz147DXPk62yi7Byv

TPi78FHsxLFQLXEfW/Gk6Kn3AYCTlmOEeH4BkHly/G
k/UKbWYpe/odvmDYDP2ElJpcKvPhvRBkZwN/yqrzAjjRlocwezWL+1AFTSN4nUA18Twttoz4jffpBTCX

1fzXkytTE5XP4RCO6FqUAJWehmL6Ie+BqZhPX1WO1IVR6ZG47WnPu3apP740qtuejUloN/TzEvsWUaEu

TqaEitm0Ks+Wdqx+n+ILONM+l02UK/35g/WuRjOY4e
RUdHiau6/GUVxWSlx7mrqwXQy5VIpF9rp1EzUMeekkr+XPnYcN131ZS5l4try4q8wEp8j6nUG0ufdhTu

TXNfmFDL9g5fmyuhbjnKxNccSGcE+1y6cYXzR6uypdvDbB6khzW2zwSndyHa6s9wDGeFuZUgx1oj4p/T

+b/x1w8CMymyz2fqtWGAge+P4pEZE7/kMc+nVeuXdm
FJLV3z89Bymln++Ba9AYbTmocaDiMgvkOHYOMmj3E/vqH+s/B3kfxrNkZgfN6UITv9SYkUxbaKLBh8NY

318PBe5BnoymReb6oCmP/sjxqGKUmvhGeKMAeZoNIh7IDb0Z0A6kiffNZo/VEc9izMNTHeLrLUFmkHOd

iBUjTIRtpRC4ibPjfqPTJ059cF04sf3Gj/eHEJwvnC
eA153Y0SrHIy1MniBaBtC1BvBw/S0BdNWj27zcgfkoylKDunlo2QCytZ+aQ1Rnu3Tn10a4B4HOc4gojm

bZyiyDhmx16+DAT4ecfKs1uqGFaIFAxxKaOnH3t3CChQbhMUVwFkjt7DO9PjRL3uRQSaHxQSi1JNzlcT

lQFxuRcYJaf8P4jAvwLtJCNrw71wQCH1Gn3t9R6RpL
NQdB+ecKacSXng42dfM4fINg7Fximp2FK0ERYCY9/T3as7iAF0p/j4YfA4N3LXrJoe6116NZg38fAjyi

CocUsvqLYcCfqqrcfE2VARrSeMC4h0GjYizFws+o5YvlUv5C9bFCzcW1kyK4+a4Hma5xDrmnk7NZIUb8

Q9kPT7Zj1Mn78xXZP7fol73FVkI/MQ8T2Vu1zCyhjo
hEILza/fyKUAstnTVjctuUSbAnSjA8UhJFqAE1g++X3+0usvgwLH32H4t5KOlblqXVeZZXks0UaAJxtL

kGiSUWop1OYgie6c0YLs7EMFJn3e/khaIeQ2A63ci6Hm3AEAXfqBVD8XA5gx+9l2iDlyjGye2NrhNmBu

29e+XDXWwu2GJ2u4eyd9ka11fJ82IGsifly9UsyKfQ
u4uPRBxT2PwqwBPdhHmzXJ6KBo4fid3u8lDPol5Z+05FGo+XkkIIKa9zJe59hCmDMzAdXUyrFhuk9T6c

Gy89gQZ6HxlxelHj+1nCRxeTiVKbavhltY9dDussbBgBTbKmtmB85jKagUwQ6B9SUAIhhYHcQkM35V1m

ztFQog0oTmYNniDV6ZZDwvgQyXFtC613oRZ69AMaGJ
8ttzVuXd/rmyyPYUWYnYqShT68QleW861oKBQV8B3bZTFgCzEJvO+N6+SI0q2FcrAmrU4+S0bcNOI4+Q

lL+etVXpf8F2Zd06NtCdnX5DjztaRl1YmtpmJpt1WD9+Hzw3PEtZziKCRvyhycLpzas+nkEelh7qgfkN

wO91RnXe9wHGsAL2lY1H6i630a6CZB+N10QkeNHPQj
UlmCJT0CXeB3RtwztMdZ0sXkAi1DZGgTnVJxx9n42QrAkbx3tTQL2goYhTnLUKWd8Dltn2+BZO7Aus+G

W5z1GHZxbWkVIY7anbTRAZ44xbM2uNlobIcsUjC141H0vNmcGk/z8VWWSHj0qZJoKO2zQT58LFEC8spb

qJyUk4fHMm4sFbfh5rwSPlmzL4M65X1eepel855TCy
cffwJDwbpEnpZ+hnEvLIy+dzQKCZ96j4pQQywhXHHuX94Vc7uNzpZJ1KR388xKP9XALV44dso4xJhix2

FNHLS42wsBrpYB9NQqVxnuJ5ZYF1O12WcqLN8//K2EzG6Q0purFHI0bSVETswiBNaEBBwSxsUxt6F8wx

Q3s7ig/OGe0B6hbCAQazJnTrkG0nAC5EPaJDODq3Pe
ozRZogVlX+uGove2WSfL7ozMxxpwc7GUZF4Gasovltj6/rQkBr5DxWekLSfOMEdiN/blPrwekOrc3i9Y

i8hBqstFaqmZf0WTmFCmwElITtNvZ2ksL94Ik/8wKOH6+BTkhx6yTf60z95cWg8ZCFkp05fsqCl9xWKx

Mh98kbXBDNQttNobnYq2YksTNpkpLi+W3IzyMvEtWB
KU1LcwVINPlI5+kfQcH9jpNePeAQCz9+YZNBkpFGOg8a+/8mqHrZguA90lgeS3KMUkkox8eSaGUK4GmO

LmYi3Ylt601++7UArlYpZTDkMciDiAghp6HoLFOi+GBWcd2f2Uk9hZcoaI2OnhZGOK9ck+jYw8cAyb/F

H/8UC/wnYIiHXzsdwJSKyut5cQSUViWZyT/zOq/AGp
1VWsdqniYHtom3f9kJPq2xqzY+11/wuni4f4QrXY1jFJmiEFw2+rkHQQTz5FlcMLBchUWiS7qvFkTAq/

0qNBAJRMW4UEPuiLFd6SMxAp29GiAL/Xe4HljsVGB1hw2xtMI+3SWqI0THlZ9f+ixGSMhakBfODd+MvC

YWTWShtQAAmmnA3ryK5Dxh0C22VhE6tPtMGQ7jg3me
+QSAQBKpfvg5LCbcc71553i6KmfDR1xJaBNEyP/jnXLTLiC7jALLz8rn6w0a36EqmKZo2VlM5okHgBBi

46aINVKXIySPpxGYVxH6o1a0mN6WurhyE6p9LCoRnesDVScMSXQNk+CV+nvZb+94EOW6FISzjDWPm6Ee

lzZj9bM8OdpDNCU2nuLybsnXdnDzs5uxCDdhDjG/OZ
sSkbW91E9a8iAT6w2c9v0YA8Pg2Jg1xdNKSOGDL1p8v3OZZaOvcxntSnTyU8Y0hvxzKmHkGLcJTEz5UZ

7Z3UzxTAkkEdyD9CXE4xU5ubqyV5gh6M45oErD4AeeU+XcoQ81Yi8ETwgMQKdPmhls+xCzKo5QqwTdiJ

hwiM4RAJHNmIha9A3KopfJJLu4YRXzluFxKQqFLOeW
CcEcImJbTtS5qNR6UeKQbOSZm4VkDYJ1lasfwPufb1ZJpoCpEhq5b+2gBqdLDbBe6NtnH1QxG1lhUEn8

2iNeiisNOOv3bFlAsjgSxRNMOFgaTtimalv+HgsVi0MdfoL5CjLo3//Ld0L/f/ms9JpRmn/nmimok0hu

Oet+sNfodwuw+tOn9cal2Olz+kQipKddaVxe2/+Mrl
6Dwzxx7JduI1ZX1rH2szy+3tVAZqgxG52HfHlgKy0vlCWhAArZzXV8jUgbHiIqnIst3C6TE2ERT5NbAT

ICxdKSCQuFVUum+rYD5KdI2+KuuBnFVWyGcvtjEiGDA5i8boNdDbk3pbXVtQdA9FltPk1xA6tnGR9O2B

uc1u4sFSu1RVctabFBtFIwqslSmsU+au83XLdc47R6
+ymgtMwMkne2oqruYXXMp6CSha0ZmTfGmRl/YqTjqIiAvxskc3uXzcpia6yyU/dWoJW0cgY+rXcpVUvY

vTSRW2Js8LbF4+z3gYxbNqnSL/gk8+mPTzrcbzibSPB1HJQaBszOINMo5VuBOwRfEfrVUi0KkicIJQT1

cyxvROE6aG8gEdUd1hu3T+d4SIcX1uZ/PPgbrJuRUI
TN70+vjbh6pO7qu8dVofup6G/p2Al1oHXcAJaMatlxfzzjWuq4NggeBkcVCj0Rmd9gT8Fhlvdjol/x+S

7OIZdiYGtdN/cBkXqhwCdUPQsM6pE3Ybhl9t1CNsQRo2O8XzSRx7CJh4PRMPPC4VZ4IC7cYctnVjj5sc

QxVd120mcYtsH4EyYxSRPE+xBeu7G8ms21kLSzMKWJ
6yutSjyCKk2Mxv5lkhTKmBSc97TdTqxRJarxj2yjnzHdsp31yfsDnlS03yvw7euaRBBb6yq7D/8Xc28Z

FFfwv/lAPLksEPF8WUZGYmtE6LQR4ARZMwKIkETW8qFwnf2hzUFOMUYgAzdCKH1y1AI0/O7+a4g95rzf

W1u1L/wQXbS4exrmAv+a2p8jlR/WVLducKvOym2zUr
+wziHm1coNsh/2ZToyttKNrC94HHqFmRvhPvYQCShdbS4wccZtEPm3PAMwLjL+bKOloDQTltmGDiGOFq

8GooEgPTNB1ankM76UR69vrswfiGkaDJb08aKEGh9ck3Dsc8a/csT/wU2cQFwSix7csGdmAssiMYcygb

T8jnL1vK+SUmvdoXGpsgmpSQzXe6bFHT/EiqlqWwcX
VxYk5A/J3ECyz7gh83NM2Jdr6r8A6yhvQDTKThukUU/mbysvnALFcb3HY8yQKeV+/s8VjDd/U6fMpbHt

70epOaNYxY3dTcf2AKA/Rn7ciH+J0Hs3yBDFeiZ6yr/fJ8blAkp225hs8tXZaHosql/V3/Kims/oHvBs

s80682C1HJFyXIBOszhx5MD3+meseuDTg2NJlvcAjl
G8rijN0nk/tbGXYKZTyPXShQ0M46luvSba//vEcvYNBJ8xN18EExckr8uz98pvAXAXTPqXGVnDUXe73q

3jf0YAXj0LRkpc/r8XvMrqrFuGyFqoSAUFtaY4GLZy2ndzecE7y+Zs5e9x8/Hj45RWUxDZh+S3e/Z7Mt

X1xElT6vASk+To/jL3DJJcy0WVMmF+2fBaV4G2uhrV
IDYFx/qEbYR0hMeMK4Def2yeuY3WhVqhJRr13nFOvNwWG9NWHZovq7DCHPHyaOaPDrYKtM289iJT39lf

W8z2iIxGpq2oRPBObiwq7aUX+N73WFHpqePJ/k7sKctzL2FyResio3Tn4q7yLnMMYZL8EkICQ9d1udvj

WrG6R/LqgykiqeE16P8K+5ZWCzlSL8WD9jm/6+9WVF
XZ5XVtap+LPGzhuSl4FWuev8lM7MLb2+uk/QAP7UQ/BX7d6CdiAeOPC0qKQFzSJilQxKS+t1umJG8j0r

5R8oDotUdcUgpv7BjI6ucBRGoTddn02f6fGqE8aqkiA3nJPcf3fjXUaH0bwkmTJo8Jy8dTLjnRzdhQ9G

+FnsMhyVww9QKatIwdcaj/kZLhzcxBQxttn+J6rUWc
jxSG2bq4I3eNVHWcZ0RIjATc0PEBdLnEIYyC19cByhGWdTgw+wBvO7gMr6knt6FAGHXVnr9r7AcXqWlk

oknQTr0B5CLfmGrqKDHl6VhuTUFRHUKyXRdWjKBTFdqT6oeP0mA1DUkrWMXTsyiPJUtXc16Qq8P43sPV

Y1gx4Hg7tD9uhXq5c9d7Imulxi/hEA36kLfnPGtI2d
/lnUK+tjs2xaxN5U98MjYP+DvAtCcUUQvIiNwD5Gf5Kyc64DoZlMvcilJn0N1pWN/kY1vsrcKXgfVA22

mzNnXmvmMGilOo37my+MHDxA02BVfxutqcgoW1ketRR12vuWqPgjPDKe0l/bp4H3kqHXfNHGa4B9V51r

8wA7uAyBr54ah8FpeZafT77yzOw1OiWSShsxALYm1b
i+ccI/kNM+1dgJpeCRyX4tsXybBdphm962uPnCSxEeYw22ESyLPQZYhZQjO+5qGpwZKkNDeb2o5osthr

bQvQt2DuzCHH0gwWgzzzMB9hNuXm0+T4Y4AjGdZxcJx4F/UkIMyxULHjyWhxUzpNod8zBnXq0o4LLpMK

LXFBCgFDtmKPUG4KsKKoVqFo0z0IaA4p4mC0JybFBv
MtCVq9NnjS3/xy1z+eJbcsf8PiDwxcroXFFR7U+zWbXsTWwVqyR0BJRj4YZ2TAwZBw/vBdf8utu4Z6xw

Mb1Q3Nhpxuq2VWTugYdk6K4RCW5usInF5aoucnkA/pfRdjr2msxSTlGJ8cMh6Ab5bhj6OrcaqqReIcdS

5bqzJAZ1fxRuFMhEeUBlsomZI+zhrKT8rRqePX4sGD
xd1iK6w3gi37G5h/IE1RTxqAwVEQFwTUzRUgQgxD4xahRb1QAvIbB9l4CmQILr71zvCxns43ukj980ee

YwygAdJeCtaiGVEm3Yi8tqOM7U0lJtJXujKbAv/cLdBEqfXFCvceV+4JMQpASn5AXncpYqc0zsIT5nGl

GqVwUyaujqQ1lSyx2CszDOOIKuIHn+Ti8NQOADPbwU
Hzko+VleCnnfcCzdotJPCJ3uiomvDnVNqDycQYLTL1cQ/dyR8l6uU/dyw5/GxU5pwbsIs73VY8P68OzU

oGZw6bpfux297+GGu8Hg7J5lcM+NLNHLJUxGQGviuyTqNoyxJUHFqouQhgTljR8B5+K2tbDMahu0WMqS

HUlg8c6F0tYTONXeVDIsq6Eu784RWLwiCWUFVw+HpL
3IddLFa/sKE4LR6HAH6CzB+/qryvCfSM/MoH1+ITmX48ifz2vwGcMgHZS0Lw5qhq91m7MrU8POPUUueb

4E4hl70OD58b5xRgDRZ/IB5/hGhdaGRpYqp+5vzQmXydiGOPuRRbNfLJSH3zsnz/6WaCao5xPV0CPM6Z

U1ipOfiHD1I2m/HRlIWY0u9ZPxzAHDEaBQwtrU6p7v
EcZb+dUGru9iURNd3kjKm3E7L/Y6UjCIMyg4jjbVarwIpEJnuVqlmlv7a/vXHCeJWCctMKdQdI3vppLD

uqQ+p4azrD1b4ecXG3ozzf2Ssb4Llfak+9zPg5b99rd/AmrU2hbILnotWzHGWd5hgDm3nlPO+4O7TxwO

Yp2qxK1tXP3z6X0F1EM7ihApW/iDzy6PiWITFheLK8
6ixoQW+YMhVgFTRzNetIrwK/DiAeds+NwTsIae1R7QL+vWQoDfqJ5EfsSq7n8T82xBJtLDjjD0Oip2mu

8uoC1Fiq5qGtB/KcIhFdOn2RAn+8EKiD6i5GlntCJxV79DVUFDJ4v1uoKr+Rv4Ba3KkVF0nLB3mKebOy

WjX0r+g7+kdguA3lHfpC1TjbZ4ebq67FZKw+/S3BAo
596o8qou3h4x4vhvdAfBuuP/WlWILiSoIHg9MP7frLP70GWMO3DrliugVEzEOriiUth2X137ZwXutriw

q8M8yGy+j62JU0lcKVVZOvr4mrQ56KeYcVKAm9uEfGsmx4h6GizSQq6AcNrtV7kR2U+1P4BHvfot6pcx

Y9HhM/WyKiHZrGNC/A2EZZ6ag63afnC4L3dgGgmr/O
fpKtPcFWd9rvw8gtjfzTW9mor9IrHw5nbwpR2Y22oDZCRQ3mzUMa0rcgqeZYCwpPI5K/qhgMzxg/i367

yJd/3ZAjZEytbXYZZ8wAcj3MM1ecel0r0Wz63o7NUyebr9D9UjK3CUQLdl457VOKX/1UnWjVXatW24bh

2U7T6+onvEDQdKsdEmoIMpthMLhJGmvwjl4rcGG9mj
kF0cjLwzfiDH7aJFEUQMpZttfcP3fsjV+jTQPHb+Pf7mNjdVjjbMfm5VYwy4SRMraLpskmCRQk5bjuo3

uZLChr1oneSR7h9dutJgx1dOPs2YjRv8lDON/5/o5/b3BbKs4ROXS3GTdFaZy/d1+q+oXXSwxn6Sc2Hv

EOWOn9uAGyuHnnj19HkiABX0G8Mz2poyGgwKKzgPg+
hpFovwAo6ZxbswLz5hlAnFxqP8keuvKjNkeHgK+PWDXs+cSeMH/FxoXFWJFPgU+4i2Ylxh8AqFdEoR3d

ZD9efUv+NW7KWRP37aTr+5N9FSlYByo0GDUktedn3G2pdmRr8cTEQIpYOhqvPWdvW6octvkYdQUdd0vV

l7ukyo83HpUkzrlQsP0XxaMjvKRy0M1Bilhs8dyHWc
ugx5nCTyvl0qnzU68zQRGY9yM7eXNSpHRmYGdS0R0axXvpcXWWP75QACBWjJy9D3wtTiY2Yo3TmruoUT

dlh/7vH+e1a3ACGKVSNQbM8IuDj9iKFwmKQXk/R4sTalhmYs/fhT4h9D8+p918w4+IpwIZ+I5NOexCuk

9Nb1G+zOFIun9LMK962WOmP/2n+k55RC+lOV6xIHz9
BJb740ySSQb3ot8vSLavEN+4sujLZNPHf4TSu6h0Fo6p3lhK1zP/fX9oHmOcchquiNnKeTd0amfMkiwz

t31WdcZsI27d/m9rZAJzUw6ARX+q9m6mw8EmikqBdSsTY2Q6jHkeEz/JU5trZr45rl82Mk26WzmKA6Do

BJcu+JwjDOiGinhV0SRSsxZMij5f26dCqOjpqQ4mXg
Aduqxh9BOGnsYz4st6M3vvcCF7RB9UL/tN4tMm3dhmZwE5GOtASZ+7eddM+/R7hW0zLL1uPmhXTf5mna

QkDTB/iatqgDdmrl/R+DG9r/p42i/lO3VVNGkSXjOk1qWtC1rQCF4wwu+IGQ2teeWQc3wZ+qohuUg/3w

NChBjxizOcLyKPafT+MmQqUJ4GrHN0ZbeO3knw0Da8
P6VX+JM3pO1q14UAo8HTCl3LWl0GTGUQJADbaIsZ9Y4WAa50qhixHNXfN/vZ3kfrC4xW1JxWMvCor1vF

5xemVWjoZp6jpqfLJX0VJs9DPpQ2+YPv48oOBa937sN/xM1XxAA1qbB9+FyBlD2HH5vcgM1g1nFd0lk8

76WfoxBPR9oyevymB4YVNVcyUHiqQF3v/XfMkDjoyQ
gWQCZe1XjjBbg8ftvnk48JcaBHcibr7/U35/O9wocKqWuZftyljIowJ6mPPn+HlLBd/xyJ78+UFuNgrx

Cm67b5eYAo8IsNvDVF2/hNlIfGkcelHalY6mFaXbaADD03CGInXgIy8G8zlBMsa74fHMwzxu36Ynanj6

hw6plsppQ0HRo6bSIOXVdR7YJPFpMOK3bBpt+pSiBw
45GPG/VWyQXbKMkf7m9jLNFauk5P07q3gHnwoX1FLVPDjosnn5kHocbVbniiayD/dqpRSH8RrBV8HlQt

f8s9eGqCgJcAb9VKtLBnAe99N4PoJvqgP4Cr1aCyjktDNaHtTZElDoCC+dq5aBYCY/hLLZXqBe/4KAzi

GXE0PuXffR/ynBrvYNIbnBaJxbW3cuuHOcRLFcD6/9
03NqGQ8yjHCUBbzbN91MxEZFDQCcfeJiMq3Spnh+o8vXMdpRjPtgIIj/MHu3Ed2jnHWBhY0h0FRHRdpl

tfhUQ52lyBZVqIPGIZaT0Wf/VXy+JkjtwJXdvJojbPUe2vTQ0SYcxVvzyQ2nespvI99ddatZAlQuO+14

fuV25SHS/F6iIe+52JxmgRyFdv5e+a5IKLZDNbLZvp
dqUYmLKvhI46/oV1VI/FwFl0Ndoc1/fdapjLcjRHUSpSgXS0TeaDLErqhi07UR3nBVVJVdAA/NmCn0uL

Zg68F0am21XItx0OwKCOcNIF5i57hmZoIqkvl9Kt+VCJw12Nt791lkgpWlo6PyKcnxy5UPxJKoHPqhvl

971Pcf1zTwggsSO3jBXn6O0yqKmRb40og4DjpMkZXy
vEWxs6qVGtlgqO5ReRLyiOhu1UsQYRdtCZg8t1QAqrY64DrDEDbWnEV9xndpYr4CCnO5VcZDIHFCsLpt

EdMl+DsNW5eYWDljK7/u/JXgcow2mmO7nwZ//XiunZxl3LPx9OeXANdjpdoCJVF1q1vppiMxQlWqqFdy

Bell/K9ODwiD39OO7HWd++UEIPiNM+Q4PvSn3FvbXpd
EcNv4Z6nzJz0zZUxoQHDdFHKUJXswQFt1KYk99tcaplWVtrBry6h+qBRON7PccYUdf95DKlj/G9I0S8P

+r5ETlEuaze0QhYKkfno34GGHp+vM5BlRQ3nB5KVNN3OGbPnYJG8KT5FJgxNVMLgnkgjwfdsiVOldHOt

rjUBgUkXxR5KEm49mbLZvwqq+VHj2VfM6KCbnqKrjG
o3UxDoiwQ+mtyni6QFs6cFT2GJ/hpnYDLgOUlViXF2UceWHSDLCTxZFyDAeHXGELTf4w2aVqx8VmLlOr

MSron/FCxxNP3MxOzvQeFLfjR5ZfGHMqDRWqBNMuC0kVsz+6+MciuWb1Q17MCIxBc+N5PMxB4UqvVYXz

Vu+Bro8ceKv/uylC/YMErXkyi2LPkKLLDbCgh50aaO
iHmycS9H4gOahwHx8wk0BBeHZufa+apprG0sNuvOoF5avO0p+59kgpH8b92ysM5XpRj5ymncciyGb2O2

ZIw0sVQWEXeQstSiQ7XL6Ep2kQ0AVEWcCuq7wXV+nTr2lEwSkWTXS3DDycjtYkETts5aNM4pa2GQtRuS

otUjGRatTzX9XO1gbhyxQKXCUkAkC2pdGch62NffkQ
/QqOj9kAW0CivE/y839higtTXkevIJxzXj0mlvdOmpze3s8+CCL3zxeu/g+O1Ziqz32k7mt1qcsvdsIE

fkn4Qe3hueTx8/EYVUKEpDs3gqoo22w1EE70/o0VA1Rj8/F7NL0RYdxK0m4RlsZaX2h+0hUBJGJTA9Ld

PAmc9RxcIzqccA4gLW90puWq0wSURvtmuag+0Jjk8v
SytjFHTke4lvtmEa4pXItASlrS5ky42Tsm+x+VHms+S9uIFSU9ikpapVrsVkrSSh+lUOWoiRzEaUatae

VjvfpHOzwvuQDYNpYZE9Xti0Ewq3LjbTIxnBNA0Qau1KI71MjYCDTET/brLR6vabA40aVuasI78WFsD1

3eTlAmKqH1VXq1hPJk4yRT5VrSS0XBPmAdycxp95c9
dQPP8zKitgJv3YPNFrxhzsv9A61iDnN7uybJMda7PxcLLYJP6HHBwvWGsh9WNo0lufwOAksV62Kn/tlL

/PlL6trCakPuFuHgSgdLdQzzNWNn4Y0i595dWGgxTM++TFvEq2ojxiIA5ga1J8zU74uNdpzV+B/t90fs

RTx7S/BaXVT5MKzBbLFn9V41UDDjYh0WFt3vHyPrex
3m+qozvuggCN7IKuBnE+3FWVzPfZpXyZU32pXx+6R8edGZxxqnxmLgvpttkXyx3Zq29ZIjZ0bmxQIzmS

a4IqhR4NMi/YFKQo6u5QiNw6RmlcjP96kfzvYDz+8mxeRz/ydaPLa7a3h3/d0CTc5QD+WjVyWfqhCD19

bEg8aeEcpknx3oF4gmwTJ9MS170eeCODyRGZOLpWf9
gukOVbZ+OmCysAAqUZ+orsj3XX52RC5kwnmGF5x9S9yBK7wN5J2jvV1X/19QBKa/uqNaj6Rpk4E3ybuf

sTQEbVR7yc31XIkM7GWnwLwgDeuoJqNNhFKP2eo0oqOTnQINrZ2G2nl/mWLPm8Wa/BbFLy7y7HCAodrk

KbwtyXiu3IVNJmwbdrcjkYNX9pBBo8az9et07LWxmf
XceyB0iTKdZOWGCfdTFeNiS6imUv/BpVDub59P3ia45CzknAGH2ASfDBQA4nE3qXenm7Z4AI+4uNmPY8

eX7jf1gOO9iY5g44xfO4fTXNFbTgc6oJEfoS5wL1trIhHzDxn0vYI+piauUYgKxKp0O1HQooTKvj0sEl

e4IJYf4TflPc8K+bvnkt+Q4xLsehCst+ilMLcAA5/I
re4aqtwBBDZtGnrMPG2P+9Sf91iyjnCmRWKyfCK2+0PORx2Urb5SixAmXmxusdKoq+FG31qYOxRUvLt8

7q7HIV7xfg/m5s7D13EduApdslLYW75emddkgp3Nkqp1Hha0C5hq7+B/XRQ1bUISNesrXDxDVEAY5mEQ

Mjh1QvvXIIfsyegc6BCoZow7cw80Mp45e6i/WUb6x/
t9P3BkmKMVBJo8f82GknIuUOmFZmtJ1ciGu7dpHqz1YntiwkKGV1R2rkgphZYhS5XuNOCPyxmwQSLGiS

EVJjlHYa2+rAxctmV4dXbeqxv6JgmRzXIRJbPqjaq+4c4HffwwEogMS4eOlqW2bYBhgbkRQ4wuSgeUus

2aWSzNFiOTU4FhoDzs1eFm8RkQcZjjFMH7dlktbYZW
11ryMgV/4fWg25IKsOTPK+2o5OKS4X9lJHqhDK75ZT5opiw3LOJ+9iP+fFmpJdI0oB2A774MsQe+Pf1Z

7GH5UNaO7KUS6biVysTruzw0YjCp4SPWFJvJ/QMVNjNKvkkdsbpKRhLwEc2y5dOQZRlDx07X6OeSNFxq

ISOXcg3afiIkY+yx9Rq/1wPXBHmSPMaYMjHn2R4N3r
y3Re8lp4C3e7qG7pe4lniZIYlmm68iz6ZeOmS3IDFyGTURjO3tIc2M8xjFm8hfemVlVSE6id+eANEPTw

XTUMbStKU37TePeklbd5xqLAM8zqFAuy239/kYQ4L4LGsYSZNGXsjqY3ogbsnRYL3J04EgtkE8IdpLnm

nddddP5KPeDMy94sxTIXR5Hfo7p6KJ29irzAEFO5DR
n0+CSLJTIi0RXhDY9gSfJJy3jH6MBbpjRVOvBNnmPCkAN9u2SQCSV7cQNsIhEJic63NtxjxdRFRoMpUW

h5DJHeNcjkl2/urb/HHBKyO1781OvUnF9s1DZUsNIBELdvNxsdHUYAQ2VCOSeEW5uuv3BYd14KI01NPw

amWXbiTmUzPehlWPmbI+U6Feo9T8KSOjK0/b+kX3S1
f6ljy96rvBkBxp+x/scdCFosqe8/NzhUY/9454Dd6t+ck37XCjh8vvTjIRLeD4ETnA0E0GWZSP+GjqUo

rctb/NlasluGdXtH8ViLdvzA2ZP7/uy8y9DtFjgZN5m1+htlVgLeMUCfBAj0FwEvSyaoCb1rjW4Tk4vx

wxrISzjA9yESjxjk3ipVfTU/EsfcRdm03yhPmrZInW
VLNE9PEa0R7/E3b/Unrf/v1jMHJVsdliwHkB0zWHOXZiUjZoYXOGtF5UPSsroCc9F/yzujPd4b/z+Jtv

K0e8BL3Ogn1DV9BblyuxdP7MhHDCeEBzHe+0H/VlhaTIsSYBurA8yZyNwtGa2UQ3As7BWJwtrGeo2Jpq

v5RscITYPcTsgIG3JJLKB2Tae0lPiB51d/IILI82Us
w7tEn4CtqCgIi/jjuQB8KCs+Yd9S7m0L1N4Rnx2bUkmi8fPh0a5GIrICWQUdg1w6bxzCD0O6rG9KjxLL

ejcsA3c+9IeTFqNO1rUUEj7fDi0pWpjDktoJ9tX2zRfPWxqkr1XXzaVEdWyjNyzbPsJmIYEUUYTxK3Bg

UtCaGcSxBCr+/xFowWRVYZoBc2KWQqndpYdxPU60Qr
+jabBrv3JKIm+2fAbolCpxtcdMExwA4y5Ee2seUnsk3NF6EqUZdF6JFV/ER6u3utoQNKwXISq6OaZJzq

yuPr+n1i932jI4hR7QBAbBiyvAQ62YAukxEjZzZzUoE4SIGw+bJKOZrLWV3xcivHAiyYk0L7tUIlP0T+

/bgR70XYb5hnwC095uw9RNoa/qUcttn0tJPmx6490w
8bOHFcVE2yXHpd6ku49TJ1hjGfibGi3D22lofYoJKIkd1NpWlz4cqo36vZZ72e+7NOd+hMVic5bNtTiz

gPT0fJLySxbfqoRXmSI87gb6/da3jkBleLrSQKoo2DekMmq0Ct6RUrEJ5n8Fflz2IkzgO/pMi3/o8qka

4oQcz8DgWOxWXgSQHpPlz12IdS/lbOWzReuP+2QmTs
XtmbJpXN3eIYOiE5MXtkICkywAqTwYK69y5WzISnATlFBZ/Ne1V82Bj2xO48iJaadJSEtRjEu/Z1u0nj

Sdp5D603qYaSF7e05cpbotDomA3DzaFXihW6f8Xe0ys3SMjKclVJ+ALV6dof8zGTebX0fwkTtR8WZS0r

Pb0BDmh3fkzmNwHTB6qsppGfe8TIoEmvbC6Qckcp0h
+h3dy8GyXFjOj+JaUNH+gQjuBjXcndo9/sdPv4lNk8wlP6Nkg+J2cacxDpEcuxmCKMmt6NHK73QL2N+5

sVtjsLB3t4Jy2aW0Im1hkp6zRaVm5fzv0ovgt+EkS30Ttf6KlVR/In8iZB0YAkmSsOgP+s1KgaJPPwhw

FDeHPSf/DFMUesfX4mQIeDFLr2l29YN+skU9YHMDYr
CUJhFWCn4+/yk0JRzijEd0XlyLp0coCI0PvnUlnNf5+AfIEAKjLjEGe/zP2TQCYkZfoI+1QN9yH5FlwI

KnwjBRi94O571ffrgT0adjFW/a61jdauqEgSmdm4+8jQVj4vFWeSj5gYwmUMuFUPUMswL1yg92gTTokB

JFDA5JULUgePID6sDQNPptE8n/KykbFRkbJI1Lg34b
MS5/9vvVN1hk82y29RfmX7CHCK9BNDXeBiSIIu27ZqW6YOphewYvP6aYF66G9pM7NISeKEQ43HbyvjIC

gncNOl/PCOIxxlwBF/2TX+S1YbAEcI77kwS1kfqSwt6Q0p/xlHMshg9CbLNMxP8g7ghrRMh7nhzBu/hM

sTWnDJF0m/zeY85yEa2u1corfVbATNm05vrKS0Xge/
x2fFki8tqWjR4wKUbg580CTBDm0lHobC8CY6FBjRsDLmgob/Gnhtafp4bc1GsNYuMsHZk3bN4PqGJnX/

irAb+v0i1A3PrsmrV02oDIFAiaRGqKxqqaKVJvdsFDoMymfXdZ01H6X30PNdWiqI1hxni/Vhl4DzfG/x

4cBi96fLoPhsWhgxrSaDov9vl+kR6ukWMGFNWk2cP0
K2sb0DuAwUyk2c6axidBdbx2n9WRtJBO84TJOSq2Fb0HJo1HH+ZpXhF5GYXzXyRRHq+mz6kt7kJaG8gl

FfyK/2kDNpAW2svXgk6dSGxxxDS8WLjITmK1VQmCoyo0TVeOx4RE20wAzN06dO0nw3RSpU170WjZEJK0

9CuKRr0luzf5BiEfEoSTbNCfIWLKWtxLkERphZlwA3
UQrL3AMbxzv+Qh+BlP9+AkVqR7SHYbP6Rcblav2KjEQOs+2Rbpq5BlONFfh+7MxBd0m8FwKXntLx5Iuu

ZiM69OkpwXfFNbbzBua+RPcnn/4WmKsRc2yOuz9VITCziWMAW3lcF+TKCaYYTn7Lq1j3OSTzNDubPB9R

bcr33Ts7kV2bhg//nSZa/yqwr/z/aM3wR3xMBzWXkw
FvpV/VxcwlZZPF0MJLQ76taoBjyNXatJ5pCP+fP82T2ZrLRfX48ekIGa1mqTPHGyf3BAycJ3TGq6vU50

k5HCBndXKZWPLBvPMoTOVts6yFudKDbSTwNWzbplrdIaCxjLPnGFfUWFXLBwdfVuF5j4h4rUoHG2i/TK

Oe6AKt4fTmzfxnXmANnZzEpYw90Cgi7tQ2uF7Xj6fd
1lR9FPQkS3etF9pXzmky1WH534w0If4pJE1pQT9TNtnE8U2mZ4ESwETxnp8P49JdswkSTnpt7x3IB5S5

wb9QgcXzII8n82AkGzecSgv2lujpxnFjZnphUARJk7ZnxXKgythkWQu4YbegUfipyEVN+zJ/Aa+ceXSc

Jnl6mZKnXsCm6u9qDxOo/y9NKKwxqwoDIppL+HN6cG
2iZJHvhGSewig3Lancy/x291F7w0gj1K+CeciEw/+ir8EygVRz/GgMlddHqUh7D2dvC/wiPKnNSHy0ac

cODSeYLh1bGF1TEQIXwrxeJjUrPtouwLCHUvnxUyfMhVKwtW4s6KR6V88yuKLfakyTdrODV4OJ/zvMUx

cX5RMgR/+zcCAArd0JVz+/y6GGuWVMs89NA/p5sZ6/
1St6i7gHdwcAAccDcS3i/vay495PEMF6K+xk7+tBerc2P1E5Icye2+vihaNlAIPWCAUiyRwXSXDAKccn

1OyzBDIHDQYnG3c7BAMd4msMcRHqs8Qmp8TS3rVkSbvpuEk8o3fPwQZxZ2bivkbAdR9v7JjPtxOB7RdJ

O8Wy12mTZRzcvNK4C1/RFRHb11DIGzrQC+bX/jjjB9
Rn2lRdheURm2ewmaARc02HxTpQ4f3PhvC9bXdvhzL6SJ7yko2NJk/UQGU+Lgvzy5rqU098paAQ8kQ+2P

UeLoHLZpiIPWEdxx68+wE9sR18BxemT1q8c3+l8ELB72mObcWb1gJBhREvr9lZFuJ4urPzl67QAA5wqX

jqFEx7nTF5mmkdPF9/NXIUOAaksv8FMIibEOnitrvI
urrUpNeB0XI5rtWQWZS2gPmT17++pms+w+V2DIamXQ3iTltpCmpwGUkoaoGrgBFvhC8GrqDh+OLvusax

3nhrRJjQaTJj/WRAXUGimpm3xLlRY136zwimH+0X+cFJFRPeHukXxD2ISN6UETrc7MvgFfFoqv7pzKqZ

9ozo/uOmuLeKchCzAQDqH9rVLdsViv1nEvO3qwDqD/
lK/tzstaiAMAEs/0H3DPv7XnWbeSzE0wkgJiQOyJd+9aNMpzvQTAi5rfNo74MWNmOD7BgO64yf8XK0nw

dnfH8fmhQMqFnb/gvq2O4AQfWWhM4geU9WsLrXsEfPH4MBZrLGM/9pScmNqTCJsLQop/QYOT5ICS+L0U

69oADrBs6HERURakdcr2dRJ1a4OjoZhmGoKOAXOPGq
UcZr56AKmFfVYT2kiMgegt1sX6t5iCE9UPp3yShUX7GaorulIebmAiWbjW+lwAEAsMUzjTfecBcYkW9X

XxqwTH3Z61DCHgxcztd+UNXV92NsoBYD/QsczTTEjPTuTs3xx4SvQht5PI9zU0rabPHtzfJALpDyJs6M

TKA1ZHmgZogi5weIz7DTsQrmdgHxJl/BJ2GokdSj13
J+UmXj1Av1nWmMYIx9R4dVgdLnReqA8UB7J1bWDF8vWANxlWRaxj93+UMmMSI8TH8qlJiaFSHgIsRCro

aHjl8eOm8iqmczX1IsXYE3ziEuXpbk0n1jRs2l/P7RfxsrHBO8ZIA4IyT8/3niHmXce4Y1ErFfrAO24o

CBQ17IFUDARZ2mJBF8NRVB34v6LC3R0uEnYy6Gg9ag
1U7kG68M9NfnI/ks+F21wo9I1WP1gFi2DIw5WvogXWqu8UQxdl9bfstWwaqvLe/wEZj2k5ACu+x/x6gc

bF6OviWOEo5iK/2zv3XPKunIhqOkA4G796OB8U62Ew0KzD9d7b2u0dLReGEicgCUzjZB8NCgToxvfCyz

wLmXH4JGAVxHJHYlLHL4fHNkWTEtF/ZrpIHGf5LM9G
r0crt36/MalcTqq8SiWXSSJyIDBjiA1t7dhJOvSKlyVyy+agcb1XGGX4gul/qLigyX1xnkigEEgmc6p5

oB6wye4f5zp2ZFNN+r1aYlibhHjPKXRHCAsjQfbw8pf4dYP71iq3ROEYKLRSkc1gyN1UxAduJzLETzMX

lbmkeiX15oxeUIdyJU/vVK4Yb20b+kmp91o6s6sGvc
K6pwJItwvpiUCM9K2LsyOpUt+KW4pUPaiDkwQyexB6K/xyb6g/+wO2i+C54cC1kL7GB0sF1q0LI1vbDI

aXUg57twTG3VHqrD15zRKaOaHuYCWtBm5TkNJss4OQg4BNrJ3TdNKg0UFMOXTycme+Cqid9TAZx4HsHw

UUlzIKsAB6bYfJSfMZhWzxUl3z8YUyIzy5U+Cv30jP
kDqURaoIKwn5Szb8D7s920V4qCQ61+sWGa7BqQX+JA+IplE5E4vyaTSPMu3LY1wdsGxvNorki27U3Fm7

FTqyKZsYW60X2p55J3V0+tNPyZdVtkta/8OaNVMd2++3hcK4ciQmgFTOw/D3BKWyiN91hoIloudnYJB2

UUzOR8Z10mTSYfyBqyTw415lDp3HiK19Igclm0/rrK
RToy5g8SdXQvX0fLjggozLZDYjGUKVZSrxYok2krlw3qyPP4tUsDQanSGq39rLGqzN02VkKJXapr1uzT

x+nL2lA0/nnQp6PtzR1/7J9/yp4hiIwTgfHqH9APcLpY10eTsbAb6m1SLEdbE/u+/dYsvQ48GvOED6FK

ApaAqkWvRoR/YvQmNC1BNm5zzG+n/9zqnbfwyavqtW
DAUO56f6f+RVMn5pb0dnV+aymD70lSQ9D5W7JUR78aw+4jkHHad8kptuRxX5e2f4zTgCpjqtOL6sODWY

dc55/dOdKiAkLeHjv2dWcIJFGpll9IqWkWkTmF96+kbbGLf4Qkuh3FqcRRdS5rPdwuUEKLw7wH413+cZ

15K57qKIkYt1fjjt7ihmLt+uS0kjER1U0FIjPWatsf
Mp2U/Y6YFBO0K3VCkvfMuZ/oHtWv/rx2NmTN5G9ApjC6ED3yqJuMETwAxSR8xzvLw7qvCrw+dDfO0z2n

mTwTO1upYap1G3kv0/whuKTxVoT8wxqQWsShLWCGkNMxK35lmiRngX5+9pmY14GXx6GTs9yjne0V8ByZ

hOKZmzNhx9Pe6/i/qguqjSCsf2noavJKBcGMHiZYeX
szKcvIHIYcW9V2iFrlRpRNoJF3CQnTRGUtUeUJLJuexQbHgDDopLtwuDWJBnOpPUbT8Vn8kOtdu2H3ze

jYAHjxtiJkYtqZnUk6ebKncvr2Hrs0rvhO4BF/u2gFPG3pruAwWkaEm6VbajR7UYn74zHKQeM25WHll8

AYurYUOJlckqxS1L3nJoWiasu06oJ2lL3k7tP5tZj+
1yEOE1tx4fTbfvZA0ffIFlN9p0wdfRBh5dIfmG+xB0p3vqCdPBp9n7xmsCE6fBNWKqK+RA7aDX0F0CGQ

BeGd/0Phxz4S3va/YwcV/PwELo1u7Bl0oN938XWhSzQMy38n78rDH9Kn8NtP3RATa2ciYErQMLRfhJl4

M0FZMPf8QFof5HxD25tH0I+cCQ0S21KG5rc0jRjFus
1ekChNVPuWEXvmNEFw8JZFd2LCymVA/ocQwXK939c+LeJrHPnRRn7JLClohNdPzQH616PLljMIPAJXi5

ZeZ4hiaKHQlPmjlP6A/OjuvWzAYxWZNk3XiaRLG/a+0I2T3y5m8fmgExBxsc9xkEQt7Z+bE8omyQzmqm

lHbO7Qbf40UynZc2RqcM/oSG63P6sehSa6WAs73MRK
+Ti8ISTiGbu8UOH/Oug3MzU0XXi3QC7+cqdrdJf84BIk4bsi7WqxBw6ashuxXFmzxdfj2I9cVplPTrcl

Kjba+yR5rUe8sbC2ucSYMrRUFB4iCs28RiGhSR/Rq5cR4Q/1tu1u/PyWMFByyoISYtyP00wwplEL2hQi

ojfdc5p6zVhUg8Fz5izzq8LdvdGjYv3gm8kDwCSt3X
vqQ5Af+qh4bqqCAJmquABXmkysnrS8d23T3WqzjC8Pu4P71tlIWzKGf1xfr8dw/ZdQYCV7qJL1xYGHs5

94dXgFJHI2+hQoBwHvjQyGv6exnxTV1Q8RRg2aWHnIM7QX3vl1hf8IMOe6uruJi3SfwcHhZYCLqz+Leung

VxXl2vobLg5CP3EwNn+H8sARlHQkgYRE3uaADs4xdX
uyfXSu2UmaHnKzbP1sQWYXVln52QFOS1Je53U4i+8rSiLDK0YA+5L68Os04njGB3Pijf9cOo4RmQDwuh

5Ix6P1yqgGOz2aVdz3k3d9oC0qrv27oWbJFYUyLXw1UeFpXGOb08411EmLT5BA8OTpB+eGEYecllPFTY

vLmkB4TFjymcglf1xVeTl1dLlvKGwYdzKqwO9zK7El
6XLkyTKF9syPdxAM1yZFcfL+itBGfghT8llcQ6tf8mrd8lfGSEYwKRv6D5IRMVNGsvV6OWIXiL2CQGqe

0ugq4e2Y9aqtIwK//TzBs2nJYIKSkalrdVxTBZddehYFjwjXacKme1cfKodPqzSd9T0qyJ+Lr9bv4QO6

6byAF0hhi6dQ2oF1Kr2kjjFckzs/M+aa6Xhnjdn0Al
z38RguoH3IHFL3IvHrzjQZc3tWkqzLLCKJ82XIyj26KM5ROrhSkgT6pkLxWPrfW1gncfnAH05ym1ZiBT

wnvu3wh48iWVzt9FGGbvJ7zDeSr+1Ih4ljdoeFywvBzCbpgYHRmObIsfKiXvPg4DH8qpkUtvDBcg6KKb

9aXHFBw23sJsqI0OU/X+qZrq3bkFwSYr5Xv6W4qKlm
+PtIoR5H4yUDedC0h/nkYxQ+nZgcO3V44bkuP/IXMDLW4qjD15s1rrxBxAeOiuMkCsA2nQ/tU9mvN+Va

XB5xW6Li9MtmGKhhDvMcOpGcOnmok2HxJ7sx3l6Qg0CAxeCczUHuhI8M+55so2R4zJAXuRNs9F5NzODa

8elig7+qxLWVoKwG1ZGapbrLDharn+07bFmAQ4KMwx
l+yQdrZjOWtQO/ugJSFUYnzN/szyVHvmvzPHYQbJKAMTkw3vWJ62e8vS1GIwbD3EXJPyVwwwcbvHLbXy

lLNdoZr4h8gB6koEgJD+vKjyCllJIt8nfFaFb32hdU0n1vgpyvuwfVmbyp/5ZJTPDCmZ/cOuA4DwjWDQ

2IJcsb416XjZg/v1kMKfigy0gu7NOMYd5fjc52Zsim
9a1q3tTpv2rLutayblc3gLQl5goxfk9BcHqBsyy8G78D/938kFTaB5Is/K309V5oAagO0kIJOsEZNHmD

+IAWnJw+PrWBw9Vpg87adoz30G5Up1OLd651CV6OPYe/e3UoXEcZtdqy/giBVKCxnrwdOG08NpMly634

9RU6P82yTXfJ2Er7uRdSxSD7Cc2Q5MH/HugyJ1FiPO
7k2pQ8QLJ7nLVaYB2yHkvQ+thahMEt6A1XnQ6FYisn5b940oGpUHYd0YBs6x3R46HwyrBY+rh1x5JM97

3Y17dvpD/HlC2P3f18aykm62IgV5+U+ZueQKGgxMTkUav/4S1W/7guUcM6wlRnEzeIQdaAKB3WT5QFUc

VzN17V4pUZm/bC9Vrw6bn/RVBJQLb+f9VZcTcbMygV
5GenD7IrXN8DVkt+31Q8/lX5vGCVB76sxv3oOJiuld2B8KbB2Y4on7fIrdFmlxVw+aBCwkc/wa0ncs8+

OZ3G813SyXZHhcTQAgubjeik6yJU/DpY+tzqnxiRus3cnD8moZjMvi553vJv11Y6RpiiGWRsJ21a3bYQ

W+p3Sx1OFOixNziGu1DVEinXR22unjACKYwsxqVkHs
0pWX9Y1eT6TpfQ2e4LF2+Outn8bt50m9gKgAd/XRvYWRCG1ZImYWwkbT38SiArljxnKK4SL9xwJiz+FM

FLZOLgmKh/GzUZ7D8M0rgLMPuG1UL2NtqNXvSnDJvTCBZawT+BApv4PdHHfF8Bn6P8ADbuv6E3u8uqIv

DYrZ7zo22SNFKEK3IOL+ey79aPvSloE7yzszcWCMwU
1vcf0PU4CIu8721/PJd4DKPZ3ZmAuDnsD2j5UHATQw3uRYwVfqEGKFzJ1B4h5u3effeC6+3pYqYo7xAb

iicSmobWwr4aP5N/ZpJeaY/HHY65efG/2wdgi5F5y2QVd4nllNpe6w7OywBcecmnNa5RjdUDDp1KCXvH

y1CNVE/cFSX8YsXkroudTuHto5cKz8q560b9GsRVv+
exFMydb4E65oITCPVTbIFx6TAV2nZ1tHrxydxc1xEhx02GLO6gS+X46k8skU9Vw3Jd9pVHhnjZBzq8Z6

H/gxCKFXuJq6MbwYdXGhDs8AQP2HCFQAaK3g5YvjTiFy6rXmJ31I0gJKwpgobZzUGiZZnasvYKO9GNu4

CNwSAzAq9UpaNrxVocFISJvI+1LfkU/sklaKM+pEzP
2BD0hAYF54hoOWG7hInlXmbu0dR3RtCQggQk3dCNSX+i+WRvQHE0bmmDk0SDgIrG+Q6UVnAesubFRcza

cAji2N91Lj+w3mg4wLVkrbLvAgPnqxh2qV2H5D3jY80ND9w412Le+wf6LO+GL/0czZfsHSn3ZQ8fXtcb

x6XkgiE/KPN+jDFrjIfLdarHpoiAJzFw8MBcWr5x9I
L9OGsJXTxKVzJyaXzUDTFbTYaCChaG7VMXG058pLwobZKFF/hvwXUGG+q3W/VhhA+Z1MPUOhcEqCVXWD

yFzRzjTrCqVyGJ4bv9RIZMd77EDrp6lAT9cHMChhUMCuhbAT4Th0pynuj4J8zxYTemPGvM36EOHGckRk

CufZ6o3bQL3VOl6OKFbehhi7HS9QJ9uCCom6J+34OB
h75sChVgpBeHWHFAbIxVTk+Tx+BULZBKyC8ljPQEL055bRIZP/a+FX5wBUyegCCPC/sHAS28bvUnciv0

FeQJvB0rH48yVn+9WE8Zc/Fr9brbMGZAp4eVvC+i20AWkrsbVGiy+0S/AWeAXS/GDRCLd4iCUwYdSnbh

FS+2LKxPqHrYa3H+/dtMVOE7Kq9Tg+BmcC5eRqAI5p
u7T4c3SuIiS16L29FdVeGvmRwh6KbsnM0ZSpgCFtEbmSmS0pjpuBiQaz4DE9chyX68rpg2lFu3CqTaqM

52R5lG+DYsjs9xWTPhACooGhPcxQ0ZG+MsP31YLcfCb1iUJ90BBnnGno7Zlf+KkPtBzIfsdcOh1kKzwY

oU80bWD781MNLd0qfRCLW0PQ9ySoE3/wdl0wR+B6SN
aRk5/Jx4H2tTWWxztPyBAYUUuHeLBE4gtEzJ2YhUviGnM/6Th5YFDT62RDosBCyOWer2RVDCKQgTc6tH

i+wC+hrd5U5BhJ1ViJzAXsh/t4dYjkScK+eTkWelxb1kiAvg5x1hM1Ets5dO+dgYhyVWpM01ZhY3zihk

FB40m+3VbRO4ILp/eHQeG/5tIR9xE2aZ5nm3tYyQ5Z
0fRcRwwaJ62/vs8jZzB4IXElU4JBNmDY7gMqj62DeFfNvdS+9D+6mMs+l9svk7ssS/fFilrzOh0VNfyC

BPnorVaVGdtWxP3n109z2Va3tmd7wO+KsLqntO9R86R3abuuNYC82yRZ6/wuGkYe2tFhWCnLGnoZL4e8

3F22Op9CUPgagaytAQsdGucHF5wpv3aGPRFWPBJ1U2
T8dchhdPlM/zOT3E6RsRm/344+pt0jb3jKXjUNfJF15AMHiF90ET5Gqoq3uI1jXdc0zkGqUJ3GPJ5rb8

rz+owarsPz97GqndLeSlc8ZY3102StOzS/ZaX+GmmKrYfKwE8YhFzbpC3H32+bx0K486yjZN2cLchehH

uokjcyQJC60pktdD3Bgqi4fPVGmtL+axNb1HgTGJFm
sN9eRhwixQn3sSNKdx6NbY7GD8d7LtIHLmgUbxPCP81VxXXa+H8nH6v514wgW/jQEq6FnH9t48iE6cVk

a4X7h+j8kvab4aPQx1sAWy08zMgkEel50BmkRzOK4tkS4jtKiw+fNCYWsr3RaSsjuzumBR13bcH+wPQR

eqolJogydLgh/GANJVgddQLICLznl+C6b0docXdYY4
J3qOpc7z4GZXhSHJ0RfV5kS61YaU0bf5dbJ7ueBCC7O0dS/smTNGg/SaFQ85iGEESF6CIA9rVNwdoNqv

x37AGWgViiehU393alyQqASk3+cHQZRkjAh0F2DpnwnGpxq9fEDBqmhQxicqOfcLOzbp6VC9PqkBjXcf

irnN0t3Ik6vwttmaygAWnfAjIl9+/JK9ld7WYBpssT
4v+zLnV97RP1wotgkaeb+oOSTqr7LDuc4Wg0aworseaQ8pahqwhYtjqdRmO4YKbJIHTHxwjLLlqvypG4

F2qVtiIKaCPndzulV54qQCYDC2qid6vtNkV36CyAsj21f44wwhdZQxDET6M+DT1GFFk7tB2dj7uUP1a6

+T+75cJ39YKMVrsmo75neSQ8VYYYbFO5oC3IbFAp5R
LMz+0EZE8Z7E4TEYceGZv3NT8KluXpXDHMjOrwnCTryvZsvbjhNNWjasFXVPF6B6T+UIqstRnrAS7es2

ScRdDgdcB88wh9SPYOBfknCH3M6vQdDDoVAzl0f9tLA5lpn//U1h8qIedXLSZ3vo9byGNLpEv2sTnxSX

M79MH/mKYEi0Eu7ETfxhKR+LouIKcj7MsZMrPQat2m
Q2S2I6Vk3JO9sXcz0CtjU4Br8BYF8S8Au9qQgZpNgZsKQBTR4c8cj7/Sqeway6epjONwlJZ1VlH33p85

c8eIT5jKhudfO+9P6/Gc9nReXA+T+lc5TPFrFALK/Pdn+1f34JIQu9bb0Au8j2/06UCFD3/Dv082fxZf

uC626ID+vzFGFsNOHgoDa8EVXDopAUiP96T/wM/7Hx
6Nm+wQkFHTKLFbO9N1+IRkH6yutolHqB/rnSBuaTT8E2Vgb1Xw46AhoWzn1i1t/yoJ3Cw4iPNd111eSU

W16SLqwt0HI3GCBud//VCtJouF0Dj9UICEgHFkXhDMwhZusll4jgRMcEC5iuYWS0g30YLfxYA7T21A2m

EC4eqR+RzxleQXKCg4Vly7LnU1CdrqOpb6U+k0JUA8
5PlD07cWHtxdGIgkOWG8SZVtglWDjyi3rBu0pGUDcZ851oKAG5e5hy9okXb37FX0l5oL3D1OTvdGm2nC

Gnxy637I9rB5gd3cO906e16+nr5UJy6tT83QlKCuf+KknvhdxIvhTjg8wDoJIrJtgx1ajRC5EW2w6D7d

I8/N2UvyalqtjT5zK23aCqeH1l25jYLgi4ZAoQ95a1
9ZdVS/UM6+BQtcCGzn1GKHKjwLT6SzYGcQjZG8rm7sPy4Tgzg488UBJSOFUpoIe01Ew05u0jkreSGcTT

rSQlVOndPPz64k7aiYDz+BTgxgsgPRDV/a21w50/KUeG//r/sz/n/1+jTUnLeBOpqkWjx3xHcFBY/CJm

cNesOo6E+vzl+sqPHX4oUBROSgrxAoal5l0yu+nCMM
c8a3/82DjVI1KnmphKXTSAx0BbGcFGj33+4Cl8T5s3tRLYEVSyMlviLZ9g7ger9t9AQK6naQSSSsHbug

b9Lyh8EEw+SqDsZYuYzwlq8cENtK//yCHuj6C+m5lkiaj7Cwvah/guMrCYN2CcQIzPRMcLYZaoKI43hs

Njy9vmvjC08Nxu9ibKxsKbz7TbvMGb1ct8ODS2Kwfu
i8n4tc6cKG/l7CGU0T4c/+Kw0/KGwNSQz1Lo09haPpJwje7li+tl9H6R/K4WVPoCLYzUfzE9nXdWfWMa

JSyYI4V11vBtPAyKV35lki1WMMhT9QQO8LYdYrJm11FUgIVksRwSAZRYPyUyi35Sd1Yff3ln02lGbOm7

EdRtoNTnixMDvuyBBADL1sQa9Iyoy0sdcnw7IpD/LN
p3o/lHgQCVVt0NGdQz0wvyb/26ZvVmRMUbBF53p6Bovm/u+IU+hdsCgdL6dou7ENSVHw0Th2RR0shy5j

VvKEG8gp2hk/29z9SQN57DAZmLR/jAvse94kCDhZSDAfrJAYJsK0yUSojb/6MI0NUisb+Dvi9pw1+1eF

F2fBu8jBzusgj2s8ifc4E1lHeN9Hclxt0nbNFp+Jn2
53qp8zvWCIeOV/9AW+ZZU55vr9lQI71ulc4H1Y9p5nH1eUVHHTGcLAZ49cpYXX3SdzIrE4mtx8rw2a6Y

pvTCY2HAWROeJDGxJHIkpWzSacNXXxbmOJS/PI8AICLAzD+d7x1vcDPy+Ju7LW9X4yZ8yTD00ZoFLGJp

kRIvg1fllmVpSkT9LszB8Kt5rs3xvJ72VOwnejwJX1
UiJoqpP0NCoPr3Kqk3MZ5Avx2FLSVHli9vlT3z46/Engd2m7UriTsyTSJwX8dFT4qo0ufgqA1XPY2D1a

i8SMvK1vUKva/XaMHi7FPVSnvWvF3rZfI/5K4M96JWStPgCDkTTWDXcJ9ymy/tN6dPrwLNjKcMZA7MOG

RYZkv3VwQVEzq3YdIh9FH7c7Y65HUnkRZV2X7fzJlz
tPiHr+9Sg9DdzBTZi2ls7MXEKWKXTvEnvs6xcdFjzbAPu9a+nn3++oykYKTAy87Za+XhKMbNziVFq6Ts

QwIug14NScZXYFQvNyu/vSiEPC5+b//O4QzS9Z/EVcakU1t1mfkC7HgjI55362YB1zsE14Z3FN8rEBJ5

gtIrJ6Ml0wB/xqvCLYQyAFjUYexe6koYy3urZuw12J
vRAwTY4aIdtwo+O5R4JgCmmzpvGcG1cPdKMlyYBr7Dx9K0QRbrm2dVpbEkGuBcM5xv4M5W7czHCu+iv/

DxeT+ZGNnrvFYCkLxm8TirLtHseVdpfIoUe9a25Cp1/82BAzWM2DzfygMvGVEcSDzsT9LkKyoCWfLHp4

i7jCZdeDJBukQyy5T6TpPiwxLsKSEUR6DljZ8n95Pr
CWAFmcHdIejiCoT+8GvWiM0pYkn7WtcR+Y/hfHLRX10LE7B+rI/KnvjOhvVwzPeqyqmZBW6ZWJCG3GZf

UGETyy4pyDWoqpOrhWpVNdtioLKv0K+7Wx1neyedftAc3uFgNrSlEGndVqW3iEBkD0KqGm17nu7B/RtR

brQZQbXspyIbUKq/y7N7wweica57qjOk18ja65pQQy
mCEJez2i9FOWpVDykWf3ypneDq7/Y50x26zkz0LIiqrxQvdXPG1kXAVt8R3sCxKUJPMO1i/C+ik1Y2cO

3foxI8plFV0ONvoZaFhRjbFxMJyBBpZzq7E7IXKDh6Y4sP7jbbIgD2spvHfw98HpzqCU3DfY2Sc+9eyD

hjk81JNblqQLR3/8m1phvxexjzuiCJPmxP5kUGVYhg
IdPWFHRsMDPgFZHXzvMUL4AZazuP1B81qOLCD1AiJVWZpYI0+b/jG7iX2cYfdwe2lv/wtPJidz1qe+3Z

a9+if+NpJzZ7tJUVNL2zD7q3X0n2GcM2CjkmXtA88kjYPfRKNC5xEZOKH8bJTQyHn9JfZkqKUDknLuUy

VDSaLEzz4EpdN/O7SxvdtT0sWfH2FHFVAJHJinzE/e
Mg6fI4bXXvz2x4mdmea47sOKHlFvX/dIdmE1eJW1fQDGsQdtK+s3mH2aldXmpxhNw1aRV62KsTHhO9aX

xCdrTG0xlu9ozdJ9YAD9sHDVtEw5hqhA+0aEIESMXiKDL/pVEDClCyWHR2kQtzGx0QAw767pt6wdVgyn

CmwNQ5tkbIt8mS8x8/rfSktCgdr63OHht1/YUat1Fe
QfD7ei30sHVB6uBea8Eyne9TPdkD3qJ67G32KYQa0kdmI+n7oUc0+/oZYAG9MzdDM8x18M7UnovDZrIx

iUjY+1bIrnDCHF+1rmYnfd/22cWyB6kyUvnJnpGyFyv723XKK0BajRJAQuv+xdS42EymSRai91iq2xIM

XNws9Nqto6cXx4BKptCu8rMS1gcfhpVGd7sqojUYet
sqCoHfTOGj+eZXdZOkw0ls4RMpU1PANZOTEm86uS3CmhbUQEMogov7GlHXgTSVEt0OCNBnrtIN2RiPoF

j7oJHZdOIRpVbgN23iOpQVmsh3bcBWocU+7Lw55epEmZnZKMSRM6l/4DcPA7iO5LjE6wVxbpXCVYoBXh

gbFGsUqC/rr+ca9wSrnNjDt52lxDZXI9ng48V/jNxb
2jhfPznNj/U2hj05lrjutEU2ASEXEbt+wdfh4bUJo+uLikuiX/KMFa/EkIUkejOCRfISZsQItqXXaGls

Z+k0eJIU+T6S9Pli1+KnbByjBr/1yJDsA0V1SF2joE/c2UvyE/ABiYzkUabUdGFQg1rSKBl7GXgg8f1B

sOsS2X0+wBF/If/Cl3Xai1J80dLZsl+OrINlxDHFOj
aR71WgVkvhPK1bvnsg3Dkdgsfg3/xJloHL8nq3tbsfG5isRN6nGXwBZuuFuk+kJ15gt8q6Tv3UzSMghM

rTDh9cQ6oqUWiMOcwCZ20eltDTO9h1E5m79S9rDYXqV1StJRRmk0ndB7Gci0m5bTVnbkdKzgeh4qhBZ5

xUlxg/KIYIqeZhDYEPkjJFu3KYCLXlCPjE7qwlDTVX
KNrru2i/tr3unqcVis6nkPv0gTLSTfumoyLQjscoV0l09lOO0ROLYHFiV/qPuRNedavjr0SwyasIpqiw

WzyBfvsd4erOcqxz4cEw7/yFsG7BYVWKMA/ndEOyW9n9kEpFhc6KKNFlBBKARfrFUqHknMA30Yt9pqpY

LrTLmQMYGTZorZXdjFV9Hppd8oCepq9NzuaYYGyNu+
EsuIStipNkkkxgqLIofNtivCcsxQqTXUV0ITu+hEUIc9dz882lHpU7YWmHHpaqfospLv7kT/5qib576A

2MaEYSFLYUejd6DN8IMOP3Yu56qc9cFF3DQWso/Cq0NbfI/jEzhYzN2SMrhh8UQwTnK1vZYLtqQ2Z7T4

PbOJW1AvIdKHSqK9JP/HvfMhO8V7zbOMCxe3OgLxUk
W1ksM5rZB4I7R7Nq2uCIve+bkVmKIKpTQO4JfNRm54mryvQ9VtQHv0jKTAfLwyftpipoJQBA7k6EjA08

ZR09F/uFf8EYYRfZwmTkJspxwmm0tr+fzFsnATUmIjZmmrzFGgSJ2tSxWjCR8kvRIxN+T6GlpiY2PdLh

uF7yhnH4Dwci24YttQrxcO8nz8U67twG81JPRWcRqU
ohm0XNefF4Nkw0EVss8bh96X7JboUrE4UC0A/Bp/HrVJfgiZ4CjWsYAEhcKYlakKC9LEt4hgPkVlR6Rf

bYxF6V8h1aFxGBM929D25rwji8VSHhR9mG34ZhG8UdY1di++DtIBUxTWskermF5TLn+RCQ9Z6GzcSA89

hWpViVIPI1573WPwBvEwxOAyYAYCtGOtcsghEzw+wr
OCTCpkG1el6PvwcA16e1ejxNSuuUu9ALDI1WQMse5I90m1B643qh2rNnhMdYjyROx5NbelZKSrXMdQQ8

93IkOlH0rswrFU0d06hqWdAErWLCOjcQff/fb7LYiBik8J10kK+c2z5ItCI8Lo1Np69CHYbRAowjwhDs

My5kan9IsocarRPKTawqKTKY8FdY9MjBIsElKj4/eh
em7BlLQw1j6hXkHMj/Jcinq1I2EE0Nab5j5dzKbJxgiM0a7mpipk1dA5HYSeEqH9/GGZWDFOQTyOqNTc

mwIE1dFHKcqBNs9Yua5LqLtLaK+hRMjckEtGm7SVQ7hQ/vBKe0pdjorTfObKZ2lzfE5BwTdleKp7z5le

lEleyEX1XPFhkV41v30aHu1x6CEczAbxH9QlIGQSbQ
d5DPRjpNJDKvLaDmp5Z+XE31RYsvxRhLEHp4eLg9A3zVAagHRHGkcJPSi3S16CkdAQouied1g8KbQa1z

jUMtzldfWZXC4dUMjIHQQkB7z2tEouNYqIeOPZQpa3fcwtCCcgsfB85b109Nh32QJRlDJ+dshPyAf5+p

AbGmM3ajtjbO7HBs8bBASPYEPjPzid7a4v7S7Gyu4R
326W6OogddRbclywc4QUgDhx7y9tVs239P5rWz23UqAjl3Gi6d3cD8JYPK/vtSitpoWQPOZyWEAC+lYW

tIpUCxQcrGSM6G45J3rJWJGGFixMu7SOhtD6jKPBfNX1O6ZQTIs7ku1EumkH95J/pLzwRbWr67bHTPi8

OHxGoMN4hvGq1G2wnjIGnO7N3FiDFUKr/Oi/WxXFIs
sJHOsgJXIOLSl733dlJL+QdzNGsEwL/32qPr2ha53ri5Mgf2qcXUHmnHpKvI1vu1rDgh8k87msGZ8Rao

F3R4SCjryQxWniRbebuzkqZ8bnFRLaSGFz/KrCi+e03Deo7unhUE4o/bcDlGV72KFFOtv+g66Epn9G1/

buaWjTuSCShvzEkdqmm/5LhLv71RYV7fSFa87xt4ZI
Qqs07BCgrptyJFhBoZBwkCb1N/ud0Av0AfIv7eVvHUMaXRcWsVeG9zvbCx3MCCh8OIwHzMWH0YnooRCX

EXJkoNVMUwf1/xWXYBhq60avs1jNNUY2PJMtWLsJkA535KwUKko4j6WgOjoQ31umdp6fR10grn3E+sxz

IvYa60DlpaI3j3WAUS6ra6mFNe4X3xejerMqXfhU0+
25EM10xYSSGZW/A1xSa2XZViHYhC3/DWVbt8vBzlRIVBavj3dYOGQWylWaUqdicLKqZhEw74m6pZGU2Y

p+9ua4kr2gjxTxsy7hexx+Y54LQDH23pKuH6gU/G+m1dfQWb8VWQ9WkjwcJs04e7LSBM61nUUecQDyh+

jLyjNeIkJT0xWwuz+OlnZhDJ4KNPKRQoN7tYPUVX5v
yQ2Y2jfgI+tk4mwhgsyee83s1hNFlXg5bg9DLKr1mcC6IsYQPkNZfygEEXgAiPwPVPJrMIi2AqzmlNSc

y6beW5azSvHiFSP212aMPHP45SlMm8im8ng6A/tkUhgQ4zkv8DG7pKsgPnv8awqCnQRRbC3hjq5B9/u+

NLZDzTWcEtqMmJUheJG81L+sVFjvjhucxkgvwSqPx+
Z165+Jude/f/QymMCYpKjFiALYj538eBnT0J+mMbzdRb5k/EyltEAN6TDLuQ7+bKLFxbT2S7bGst0Lgvl

SzLyDWpIrpAES8iyrcSgUZmpaOpG8xaXVYV6P3c0sFvQESt5ShA0iyxfPKmfu7IEJCk6IPwets/3wk9t

4nzhcYN7UTpW7qKbvATClSsttINfRghsfeMwJSlPYr
qMNH9/E8aV/+imIRhP4X31CqbpxStmy9OCMk3aI+zYker5IOFaTZB7ltznibisFnlQwh/vatPQFzZA4O

ZOeWSikNXbDAGXkoTvKRM/7YTV3TYWKnYmNU4fKrN3W86GvdRnvdr6bDnm1TUnUNU/Pq/fdhb2iJTs12

GwCpJaRq9FhcLmXl79rsedU05UuiM60ikfjAg3/Xse
n+zHUdqItrt/yNDZQ+XM4Cw/LZEDnywjg5k6m01zF2RJmpk/U8nm4kYkWTBxLRqrvKx0HnHZFuVJaL3E

EDZ7SRDWx1/sqq+MrrFVbPr43eop2+v1nEx2yJ2RFQuw4hL17YcSIANV04ecyeH8ihNPZcSGh10+SPUx

EfOsPvhPwPXtwP1uraLqDT7BsKb4W4cwNx1uK+zZOB
wcLpzMSiT5EyKR42LCH7Sg2sbSRt0963rnHAtt1jJqNK/XvcFe/VD2h8sfIITBIkkVf6ckm1suPoSw/4

lM52oVZLUzXV0vmN4VRtUqUNaWKv3acXOztHuClafHz/WtZ5keLQLQ4RmVjkLfjxQkIHkAk40rk4Rgg2

6sltkd4lktXqlRwqAXX/o+6eVWCB5uLSsgbz5lT3H5
+2nY936QsqWEJt2guykbf9mx9bqD/L/a/oyg9+N3OKNXNE1op5UZqRIABr5l0U8wXqeudNtahUv60+HP

oF1RTSv1i1+RRYyjOsRVF6D+2I0mOcgwyJz12KZobGkmZr0y9w3OlDWcUXfZ4jYAZEyRNdp/kTIq1I0i

IImjifgrSFWtmPOVJNTYrJYre0QCmpnjfl9U+iiICu
i8xClvevue2VWGccCzuB2cmZrcnmjrrsTwMFxHWK99LVOotnm4Y1UFcN0u6cub3eqyOsrVuVGxezDK0S

zo5CtEDGeNfZ/G1Ngj+6x33fSqFfXayw44yImAjeeu50Rw/kqhVrf5PuB9IQs1pcpaOa+ovIxU+o3oCX

MWrpuzQqIwH4BUxvKB1/3fJ2/TyvTXI4c+9/72Q0lf
TvhAX0+RCuaZIDKCi1+zQ3xetH0/i9DT/DkerBMzo5VWFvfUwdEO7bl/RZZKpzuT4A/Cu8WiOfSmdEX+

Z/mq/5T33hgULaZVstn6rZVr/r6wL0RAV/uvIYWpCWJFkf/1gLE0qi+zdMpnXCs61fO7pYiZjBVre7fr

HxCp3R62neDEMBV+lZecnjqw7G+jKODpKCW+3gVubK
mcqgLWMx8qkFT//7ne7G8JfqQDzXXx36BH4HLo9PPcKW8d8d218PtW827vGCovmuViDcs12Qs4A05hRS

A1y2ocxE32Umg8tZz0/iKa9jEC0qNhz7EaFCAYcvCCWnzS3ZCEMOj3/95xePiDcUOZYtbHcNcFSGP0jr

7qpeALXVzO/26g958rlAk/Bp96OJ/oWKac/AhiXlzK
rjXGBu/vK3FYSZpJQI/L0T2WEqLVNBNNh/egrnpK7hbKnlOyw1sy7xKD32frSDzgvaH55ofpRPGkGuHc

3+ANOxTlDbIle0RxpPLE/9TBJ+uKWXp+vrtkrSFQjqF07NJMue1Ao5hP2rgS+2p81hUshLkP1GyEWYX4

zzRgNF6RL/6wVpU4H0USESeI/oLC3krzq1i/1ztxT1
KXQUR8j0LMepnN30JoWZ8/AMqDm+WWhpzbx79vO+uTcTD2yHnvk/1OtvdfAFfP/8vvef/P+BVC+Bcw/h

7zFzA/+d+puCQUX/7No4eXLGmDbOFN8+I6L7KqIgrYd9uM9+82gSsdfw34JNzS18/rNhrsRR08WICTRs

lCFzmgS22fP0z69VJeqeh1aFyJiXc/oIVsWny3//Yv
ALaeFJZFw4VW5Q1AjXn2sW5BEGg4ExTOefqZTF+Mm5v9YwfA4Eo9Cqh7XM9wNmxjfpyJhbIjrLta0Vb9

r2SZwN+mKNtB+79+I0qSvZg0mAuI/Tenlz0dUiGriybPKcsXx72+fr7zI/oQshlpGfURQVkg4GQtzV6/

UQwyNmmpM3kro07xJJNBeHPsgBfhcV/a7WvG5af7NA
nQ1IcqnPh/3IBTbHq1Vl7vlOKUxM+JLg2zSqHH+TVC16GgKUmB40BoBm/MXfK2O/kfozkxyxYfvACf1Y

3L0aEiLlrk0QNMPgtiWKo/gO/GZ2IBUN92Dlx7OA1cOdXE8fTj/exM4o4iAuTs3t5e56fECt4/0ixP2n

W/IkMtqfmtqWQoHBrSvrtVTLjFqqmKXPBA2JvCNhna
oilsr5iVovrk/lYnxJSTRvXjrOxL7ALocEn2aLHJ6cALYrq3Na2Sg4hoy/moaBwVWVV3Nwg3vo4gFXDA

qKynf/GkuSFzgq2bNNxkh0hqX3GW006FFCoFoiXadq+9bxvizrbixtBRVq/SBBuuCu+KdIKf5neor9oS

JxEpVfhwgnBCbhQIR3h0Yd/RirrB35PrGKJagOAWQL
fZGEXpNQgtx8F+wQ+n/YxE07FqxTZ6uqeLftmdZKloo7nDum2WWTWkPJdXvd1oyhuP51/xSMlH5NpoYE

s0e5NBF6H/+JAK9x1c453lZ73h7VacXzzM+sg8zDjlOeIdQxY1m7/6MaaBKqNHo1thU4nczSNmdfeqYG

bdDuKKnhzyEVpVYfo1/AcwyHQEF139TBLh17tHpk3U
Ck+Y/PKGokBghx0oNN71nKbNivUV9L0KJlu4ynWwQG+gt31f7qQoCwGq/u4q0tv6QQIKinUBowAoPT//

GQv0+kiEN5y/GHE3zqGDXrzbPTuirgZ42y14JojdslW7JGbHLakawP5+Cjc//l3s8780AgPurVgRC1t+

i39gKSkIbJoUarp0CF2mfJp9SzoNPVhAVxHfryfvRg
yeor1zkHI0q2/aWgZApuFzV4ws7m84s8N1NmA4PTXv1UhLpz4MJqYUMeqnJqBEO8iXdqOfixKNcxfDpq

pkqv0e98AiWrP0O/edF7KfN6FQzyKkrjVV/2t82stzkuwR8fvKLYXYkmxpJ5QIxRZE6Idu11273qJBof

/0McCSeBYa3CveTbGzZzaDZMFNvtQF5dIxWJr6rzNu
BT47dn3akjCMTG7szVoNGrn3m4P1vlBnOoq904d3dNTnitJ8zNBeHPEDzFaYa6gDBWvK1sdkLSZ7SDCS

MVEzQDCUmi9NeIV6f3OCh2b9SorKv2+zfvN23P2v23Lnp49khL2ni0F197AT5HtQed6ntuXsHQ3RUP4t

XlaV/suVM+UUZp9ReYK+wkI8b7D73p4KCuvvkGljT1
Ru8x8QWNfpMgZl8fkZeoKBp0T7ohp097DkRrSldRSXG1aARXExlteSNS299wcIl7lN6+nc1d/sl7hi0/

dzOVh0ZnvuZ9/fLMKWbYFLIr/vayx6Z3Epr2P7vJtRNX+vSFWLT9jlwvVbq/0iupRhyPpd8BAXdOEDni

ZfgOaU/7ZH7cxSthWylCp5bnwLcSH6H+F9WtARpuct
2REcmggaNDXKlcdTfdzwUR+mYj4ZOWd3MkUbn+snHlHZOq47b8kH5YLXK7HniTQ3DltRC+MFPrVGE2zM

DYnLxwUZkX+joO0MVgG1AsiGtGUkYHgwcDET/1ENe/yY+fM8DnNjgZ8dHWE314Tl2KUGZuv/HayQNvpK

NuLxhDLVIZHp87T8Hs/mt0ZpHJ34dLMW3E3Phu1YaG
HnafMuRsUQ2QwZerYrB3X+hgzV5PmUe2NCayOZIsCcI+/kbQciDlqmy8kCsuRiY8AbjQ9gkaYvkyOasV

RPfNJoEHzXmDjgzQAYpG0hFIvjkenf5V8BnEACMRvFJmAZ6aDyelIMeEp4e+BbxEiy0qfLMFFZZ98IyC

WYpCvmhswOfNwYAPIBT9j5OirZ1iL61rl5aOCrne9A
3GgkK3kou7oc/5cqNEPHGyWYqXXa6bgU3RoZHrrrIxqDODxJCtKBNejULiZtFf4pwSBjeN+kGcznEo1H

uDqc04ac3mIXXa3a2zPGYTSKnJsne6NZJuIdNn9EzYObETTfADNA0vOY8I2l8sVwxp9QBtI/Pik667ER

d6qZVQJH+Y5vUMlS0BFMXutHtsyAT7dNCQ3NUWAzZs
+9wxY1fAWCe++YBjRL5NEoSvx1sw42+BwE98o72zkeQ1iqfLlOrhfRO/yXKs1GHE8Z9eNZfoYywBdJtj

Lgw7tqulotIEGJE6ecMeSwvUGqdPF7PaXe9zEx0/iIIyMuF1u4EF7qUvv/MkPtqPz6A4VAi9sb2vJNGp

4ozuKTk6bd9jO/NbCZATF8h4yMc0P+xqOHfchg/AvK
bt+MxMKYk6mSdB3uObCy7dah66AbMJGgpMFlprYVe4iZiUYGdNNe6T0oR5a30lhkuhiDCBxYyfizrxen

8y38RkIVItU2Xy+PvKqNr4B3KaBVzZXWnplkZLm7jF8ItrrvK7K2ExYwCis93VfUllQC8FmJ+IIrgePL

NxJ4r28GkV57EaUvQVIHhH3UKaWcpuVFQfwOsnA/Sk
bJ3G4Q9+zn7kQdWLDrw8GpDejfbbJVb4A2gq5/tO9JFEPlknjurY6+qaqSC0eaXrAK7nkX4m4P28LHn0

lukcXWK82p5Yym4M8rWX4BNjESoZaWmjR+gC9dKF7rnBpMyylILvnptjQ9ZoEGVDsNJcO+lm4o0DXWgr

8r7JVTeT/U/of2c05FTjkPxTdj/XCBU+o7K0vP6yvq
mg952I8Xx01VqPVSDmyls+pw9xo/WlURxEcJxDIOuBUtsAeqeA5tzPMSS5nWqLw2tFyivNvWF5QITnDV

dW5scKu2Lm4/aZbdqCqi9xLRb0CbCmNZThLvpPnCMPQWr9lWqTCwTGWgv1NOxfrBT8XbHXdmVZQyPRq1

Vr6rUDkKtpvGqPv/gS4o0xlcpZUpxEK055DQ68VQIP
3yFXHvO2tIQhjSs4FzbWrCUGMksoG08nhu2Q42kn3FgP0QnahPRGHEV66zM5s/P5FB4WRM8bxNChDItA

r2gpK93yHnDvOtOxaJxa8Gc27ESX2Vk2YVIg9/90FkQ8mM2SCNaT535aPP4GFy2NITbdq722YePKJr5d

DxIXyKx3pZX0XvuuGrVrTNvT8Ht7At+zKm0Je5/OGH
+VGOiL5RkugUx468MPE8RuLinPwXGiexUZOKZIkczW0reYqRkz0NRXKxa5j3lFARJAVEhk9V/GCr+cmH

A71cP8TIoSJsg6JIhuKFNfh62uCCA+AaYkYpa1g4FGg1SMXcwaGg4YtpNX3H6Dn/8h/Rftb7sQdM2oz+

W1Sh+Dlnn3LTA+LlqkHbH4auA37/1K5gSzwohlYvS1
yrw048zQ8ksIBzcKnfrwzjFea6NLo5AV92l3Hf1BZ0DSzDMoinV5mdaobf00+kgzXygXsR78Xb8ZmrNK

QUfz4boWFKMT1PQiMvXPDugVox60v9eVqt4UOgKq5oF3oDRh8ITVK7qvJZgS9OJUJ+R0NXNR8cpE6F/t

IvXW/v8rJmgjfN2c5WS6FU2elVRSbacskCAesg6n9+
ghomD7mWmasnsdW4cmGe67g8x34hpc/eJlEIfUQNleRME1im0FVB8g+KoH6rk6TpcYe1fTflBg3Sdf1N

vlt57alDPe/dYkGd8xWSMyFmHLuJ3eARzseiZSG9cZK1vjeWKEDt7LFEwQSX9BTQiTDWtBWzi3EhAbeR

QdoD/3HsnHd9GOvyiAU8qn/gUXadJCKUt65myXXaop
F9N5EWZ668YIwrUsDgMO+1kLZYXiHPZVkGD1O8m0dYMPbN+44dH6nrDaiXVAxycc6hHSkqwDJhU1yJ/w

KELLY+6OfID/mGFsc8DAplWu5pPMV/ZMZo2G6yeiS5zkz5P0q3UcOrVu705pgpuHdwRIKja11f++wlMX39

2VVcuPjnf9DSX5jpjCj+pgam15/rUAg/UhB9ZVVAwe
6SQga7k/JyzHkMVqYXOf5ps2nktjxm8biPUGWIaR16DgcH90rSxvcjV7ubdENjziX4SgBQ/kTs8I8jf4

sdqZ2x+H71SuNbctw3zZ+bD86Dku0i/Jf53edm59Vx2qHfymqsysj9F5gPvHBAdq0fpf9yxM3ONgpGQ3

MS6ax/4uhpdf0aI8AT3FB19Xfr5SMK/xoaWkqluAlp
rHWO2L7EUC0pnwM3OYuGC1TmGLCpNOCLH4iJriEcRK0EvHbpfYML8cCrSqToW9GXOFsAw6cwBxO/c7UG

fzzI/hyv9CYDi0SiGweKWkZWv7O2G+MmwkaektBPY1HXCSUccHuIHvneRE9ITXVyNSvrYdtVIGqbV25W

KRPixw97Oc5Q3Bi1gbHMNf+0p3xTILtnQU9vstrwSJ
PaJ+KEzI8jRsDZ/N+TEyfJRr1UwbAEjjJo12h4GC2rVDx1aaZrj6g3Uerj4tcn+l/e4fZxF2I5pzRQBU

PmZYmXqMVayJVkA8N0n9Ig/AyN551eFN1ux3NL2koST02lha3kowQy9KX9Wdto6WYzwgbC6KjBeUmh5Z

+VSHXpd3zZLvYPh8ee0Jv4HORtokCDlCti4XH2xdm3
v6+HsOKq/yxvYcbdhA4UJNHSv3Fc8mdg6HEyhfj3AlDZ2M/EuoNNP5lVYq8ZcYdxWIW0EqXalmIpuB/o

y7NNDK/TVD8oMybU1qocLjRjutH1ytEk1gfnCpYAqC/QQQiGjLaxAAWohK317X0QUpBZ9OLBliUy8ay6

E6FJMA46V3PszMMIKps0IeW3DeJUXcqHCGD0z+CJNV
aQlF8BpZi+XFfx3d8XcOynZr2/v5iLYYJardwW1JdqtPbfOo80wbGRvGpGaZhOOXlzqVv84DkZH1OTE9

BJOX3C07v2Rj7VxOdPFdGv5LBtd0r9CPdRM9c3a1NOHIJkVZaYALFptvMr3VAxyazPUBb8qaO8GZCL0a

buhGuVsPVnTwkOFgAYb926c8qZcVVxRe0RmSZLNYui
n4ETKiG5G+pW+DEgXRxBf7rrCjPR5swMKq6MufHuvUeRfQtAw1zHQ1xXfYF9Vd1OdJTeAg/OLGQIHIur

TwY5ZB6430Tjs92PunbRcKL1hrcvoE8LcxeinTxHhCPYfICz2mhpXmuGB89wT7swrK34DKoxD2aaIKa4

7T7MbxwoxqrS8bSaeC01hk96NVZV1v/sv0/NicvwBr
aLlwgPPAEXOvWmmQK6XZHJeg+BBvHv/p5yI8cTD5QQudMM/zf+1ouveZo+QEhLPPh8d6U5IBqX80VFCI

iSS5b4tTagw6YmVY0kKyXco9XOvZH88b2KewC8QW6VhK2HnubOvd7/lruyWv0VKXYxJAYOR0h3+SxM+J

f5eueomXG569YBVqM2SMksAA7JZjvxVPUze5VuzMW5
J5nM7Lmz/qhib6DME0Fq2Asj/xum+m6+RTZ+J39pEDoyobFAFvZdPjXaQ6Jq834+gSt13x+SqYBsgQfz

3A6X8PU9BkdOQNiG3nw1CMKUN34jMPmZaUztsPAENVLhmFNvlw/2Znf7CiIbvwlopVFQgdRDJ+DiXFn/

MANumlFEDFO9KZDocBNqR84zd2P+vqdF8T3bPi3XrF
LED76hfn3MGUWC71Mehcu6eDyFOWW2q4jpqmaytHha+kE3dLp3awRSRe2uHuMWzumNIfd/MglF8VewR6

hh3+VMhtqIRpj4OnCUYGf4ig4dSKMcmntRQ+QJG0evwxNLnE4ijz3t9G8S6cnIYENm4rK+fX3bExJMOD

Vwt+PNWAxTYjHH7kiqNWNtUqTLv8rt5EZyv6iD8CA6
8GwrkbuhLPBV+Efo/EgmDe6HKu7iLYzAzAHN1ySXzlatP6tLzZXGs/YhFWxVYIq4O3uP5WVc+RkdNEqP

uDdHHhT1RlPRlq/ac1x9i4QdzObwjzJvonXisrJeaMJ02zpLYeF+vpbcwAHsC8FYjLBtJO3RV4KUFrqW

I0s5Fk+iDNunZDfs3evIKJ9a2Qrc+r7jEf04jTEfKe
gtMskoowleZULhEW2w3K/OkpNFDYVazBqWabqkViLMu/V+mipvEtej/+pCwDQJm1hw+W9HQShFTQm08i

3f6pvOk3dL2RGDeiqdk0aLPhHp8EJnhc2pS1WiRqRyDuYleB5FWe79oqgQzrug26vaoPmiyylDaZlA4H

KMLllDAtA+6VzQVoAc00uKL1nyWrLLsZ9PfR/s7GKz
ju+BV8p33HJQcyQ7RJDzAudw/qRsggSPUhO2NtxX3GucdC2wK2S4J7ZKlHHIK89I+LO7VsvBPzVJ5vU9

GXsgmZS8OmfkbPfSFxtrmJb9XpbmaqHzz95VlbOoW0uU7vlrSq3s9YpyvyYps59PuH462N6sqtkkbv6f

PsasD5NwBdlIEbEp6mf0Iah78sPRMMLg1utpwljAj5
axXWieBkpucT4M+nYOvwEpFBsOsWd8mgeBlLAzfBuGZccq7knepNh+wyEsqMHCc4l7Y5oVlsH4v7E/LQ

vEEctM6P5w/24QCTToJsdT2nVsnQXCNcd/aWSrJY5uprCr0OC3dicXm3e8MefzsSZmi9nvHBu8nIr5D9

W2a6pEokUryHreOm+Gq6U4/AIrJKasRAt562j5ag1W
FClfjjrqoc0pgL1qw9IUpHkwTsrf6dt541R6mSGuV+Zk358g3xc/2c0Z0SVou8/+c7uYt9vzIlElmEeC

h7lGNWS2re4qH5drEaYhzcjZap8H8k6HmZ+kNR9Osw8vOuPM6PQfIJFreDpQ64JryHGigd+WKUST3piE

MtD2DSdBxd2s+DFfE1y8guziW72W8w4oRpCXaD8bZ5
057MaF9PxThxQ+SNHWfKqPHjo8FOep/WJEkGMiqApL64UxAo+/9wqIxfWZqpprYE/AL9/hChpeULpdUQ

bRdUOCqYxIIHsCrb6ieUG8qv0QlFvEWke4vuF89DdM5Q3wVaUq4grwYPcf2P+V+tUnXak08H4m/VWcSO

Fy2drYBRbP8/PttmPsR2duT/DvYkHrOa8LF6cKrc7E
IZO0Kn6KSiO1oF4WTln8G/HFp9WpkcLmIbQKXFGqOf1meqn9PU4ms+g+Fy6d4mLnQoeTsyUrUqww2JU0

Arb9dLIbFyX7ng6lIFoeobTqJItni2nbredsHP/gDmWkGJxmBmVimlf8tMNr+IyMj4e1pOLY1gixbg2c

lxjA5byypdt2RiBzwe75457397gAMsCjzG7jiOCNr/
J0iyDrRWnqXuTl08gYIGwq/9nwr6nGf8OSz9AIpjVtBhfVBXcp37MH0YghSEK2bc7pKkfEXF5ytzTMEv

n7+g912ka0reno8a6HzRSi7ZAwpT2CMjB0OtEhNABoEiUPWETwePdxH9xefjsx6nFuzgrfr3L6NHZKqR

Gs1nwhiEIjJ0slM8dmCpO+rrWd3RxfOAXAxxeJa2V7
EI71/GdyeLfVZDKSlBrYpYZVZ4EIvVoZVJuY4/TbZXMPj+89EQlpcO4bl53sFGJ+dMi1ULZSIooxaCmD

vWcgNAx5PA5Db5mWrCD6k8yobkHvP78ojLg3RzOpCHg0gqp5v3f/OiCmkWsctB/N06AN9aZfzXJ38V45

S0M7e3myxLUDrmdYEn3mX+ejj5odcCDLzN4K4+XPj/
4/PqX9fbvJrjQCUug5sVwYVA2E6JxVXDFntHs+7k4se/gEuGzu2/gGh/jd5D2assbl2lpcGxDkJ159A3

MhTBbqSodsii7X1CH/XY/7OAPE6OhRE+6O9S/fyf8PqZx2Eo3ZBNw4hIhs8Lsbprtt9xJTprIVZ22Lwy

OORGRbqODII0GcOro1EXvSkj0gM2YlenxL5sgHXs8C
cunyXrUUWjU9eOroVAOlnfWh56o0AjQb4S+n3rRM3HDpKGTV2CicxZWvt6Hl5behQqZwha2Wij/dBobG

7FluCcTj61VYOUCcI7ys2GXx4zOHelOVEYy12zaIAmBjGbtckUNKndQnOUBF0KJL+ICHJxni2iUbyzpS

6KZqS2TXpJ4dZrPfk0F29wSa7ssTWWtgfVUB3Ar93b
rmou51PYHUPpzv20bpF/by0i8La0zl5jiU2gMpstIl2oMmPRUwI99ZL0gN0X7OROFFKqrhVam/QsHJAh

FJDrPCLRE/t+vnnRbgXUJpA4AnuCuFypzgCZd2YYsTbVsd5WEJb0e4LWzXvcWnksAXgqi9zyNPFkMpES

gijrhTvgcSSwQBBYP4U5kUu4rx7Z1JOEarMAi3IHV0
fGXJ9okHDuSs9CLtTYgJ7HS2aVvg3GxCctk7kSVbqOE/wGtoto10xgSoMG8Xw/Guv3vaJukkJifXJkb1

B441xWkeuet4B1eMfisHV61TeLjTOFuf7V1cyI5kD6sS7BP6x8oIKH9rYPtF3RbzKBA2/C7O5Fx2ifDH

5M+bmGWdo95LxoRbIzZ8/v0XTH8rm53LUdwMb80a2V
ZG4aOiOQ4ce3Khr1KNeyiyewhzLvow58lCtgtxs8EDvR8tFN33qbuKYhgVNrqMXxMLuKys7ntCwLl2u0

gXceqzrgYEouaOnFT9VjMtXxfTh9W4dKhjtTNwoBjUcjnAEuIWdFDxpkS6a+gk2yuYYObb+2AANUsq9z

jITMcEFb0PJxVYaB/wU06wvVwkd5nNuvrVK4uYh02x
UDrjtazQN7cp3+xr/25pxJsZ0ypR5FuQD2VVXA57s4cTvZLPlm2fNMZi9PvVxXl3zfW8gK8jyq5+stNr

nDvP1EC1RTGPEdUr14AMHhrdE8z8eKEPoKvQ/j9HbnDyiZ8KGgqthYmyytBaJHkHunrViQLXU8BwHpFA

dKxw3e+E8w1hXje/HUwUTFW0d0p3r4Jtc/DiJA8dKB
CudXr4N+Mx4uDYnOIyPT38JqoUZZIv/tCieKm3Z8qA81qE5B+SBQtykS+FqZ5vuItTT3pcObgOQ/Prkd

XvZzWSgdpPLsVcMU13a1Je+vcL5Bd/9lbUHzJsAo1nYGoq7nC4Jx/cEibgErDGmeFTLIc2F5GuBJU/pA

8PEVQQntPX28wHSt7Q3ZavOa6uVReenxIk5//YUIxw
W+HTH/ByO3AZ6JmIkwEE/YRdsEUB1oMYZDwONQ5IOBmTs4hODk8mr5Tv+7TyXY51GLtTsVCy9lJG5c9b

HEJVrq2II1kIhAl3ixEBjeM0WuGvdWuV2aLtwmmrSsUiaT5vw2Hr5G9EBxXqm9ITevL353HP1fe9436x

fxsAzA4CJhOyJbd9TZ2KFczXpgv2liIiv6XSeB45yA
M4cWUCQJzsMZH7f3WLxUiVIQwynHXV7erMHze6scLhpijmPI9+Xq/luxvtB70pi8sVM04EkMIqtGsAe1

0UwMnvp5Ovyj3x6VZHW/4XxId/dL+vqeqiTLSmzQLrUSdo8DidBMvG7UIUk9IIWCRHHogksDI/RxxGfK

lqH2v0WRTTaqX+/xx8TLLATp/+77aOTNwy5hqcE2nb
xlqQGBxCSvC4u8EWCc2/KDxOo2wi7Anh7DiADsC9zUy/GgPy2o+WTYOt4N3t0zPsObjY5R2wJFIEzsdG

4Kle8sACnPYGloSYzgxu+ryHPW7Hk1l58FgiAq2YpgE36RaQtReQIO4IwFy2AJ4UpvG37CuJKwQA/ZkZ

q22uVLALWH7ue402QMhQD3JMkAf6tHhR95YWhm6gF0
RUdxlUxarh3B/kzi6jgza8T+kCGYNkPPE7KvYXB8Y9xFn5D8cmB+sfuKty2NA2DMgHWHqXaF+81oos1d

9hxnkIQoPPlHtHue3lYxJCY/F260gXOD9uieXsItQmJl4m7B1gIo4+GLZIUMSnnhdqVh+VCpPAbsPLyf

AQrbXf+8kVZGDySCCJo0Zbn6V+P+ACxj3VnlkEHH54
LaXt78s7Fmm8SLZQa9KL8YifafESylS6/zbiQqSEk0mwbZvcmIAS42waL4yi9Gnct57O74WHCL+o38Te

0Uso1QtgZbNFx6CKd7VIw/PIUvAGS4MKPjBmE4qKqaMXaNhZO9Z/7KWQCxHesfFlDev3wDpGnD1n3xW+

ErVjW4UOuEhp/GllPpcR4YLOpUPhpzbjdfk2VmbXNB
Cef7pi0tqvVIv2lKx53Q4eoV3OzyfU2bgh4jN730YmDbh5UC4uIut2GNNGACQgp0HBqcNUX0RNeudtWj

+t2y7zfTpZXHiA3UDlECw4cw4LMqao0trOfQ0cFHyBdcLKZI5Rt8zl4+8ZqlLBtB1HvwfQ5i6UOImiKL

KcnJffO3dVbICA8i2tUPOyHgrLqdRidjwIq7Z86+jm
a/5O4xy0jDrPwRhw+eAtydTgvUeTP5HBA5ZCYOr5WNvQkshFEobfry29gjJMbMIIUEMRCb6WYrHZXmMD

4rTxbP+sc0VThtlUJpCqUWmYdoa6jQSxCO8V50JJg3/+8N8xccZHhFj4m8aYTZMf0SNGGKE6OVjmBLyg

rCqdS4i2ZrgYaWdR/NjJ3yiTBYdB2XQwyhA8nyeH92
VW4sRsGaltPXeyl7LjS4gS8bfVHb9ZedjdpkSpqbRwYEWETohISTBGS1WCuDlJ5Dq67x0WxzzWDbkct5

IyQYedczbeywQLRiUax1k3ujUgXCkmsWO21K1GPbupAGfMYKgrv3b5EpDjV8+Wt0XxkNT0fazSfgzEa1

T3icKASaspKr/wIGNGsgvcSycsUHy/JYuOWi53q/75
95NsrZ9YtKNGGZ77dxPertcWch8WEk3HkfenKPxen7mMeT31hBytfCR2QeV9ltyqSiG4ktkJFreWYIhk

4g0e28xomFTTm+7tlQ48VsE0J1x8yS8tgZ78kTSbdHW0bcm+mhEkAdHPWh8eV00uVMEL1FQGtsHAYvMr

02q5OED2S1DlsHLzpIg2Q2+1A38Q8KxsVq6pBd08CM
lM6Ix3xgh4iYCFpbsQrudvUSXM2WOXYQBi9tGqUmd55/SQC09LGyWkaTfsiLsOu4A8kSPJzpjJgH1T/G

q7rhvBm9eQgMe/SHM0T2KhQjVBmpunejoCErcfsCYq5a0Hvx/QQVVFRtQaDnth4n0/qgF+o/efhCnG5N

duq5JW6zmpPzb0mKH7RSTSaJPU/mZNSNYDfjVj2gSe
1XGvfpi4dB5wZqEs/pUjcoVcJSJIjtNrjqEe77hIyqiMgOzVtSP9a94XInXopxwvY3lVo6UyK1sIv5c3

mbwbX1RxpaHVzfRppYUGXrpXxdGjZfAIatbvci5aVKHX2m2um/u+yfkNLlYKKr71pX37v1Wf/EGinK2B

k/Bgm7kwplwQlpS+v4Iiia/YJcbT5JPyGSJuox4D/E
Yad1uDYWwejZTumq+Kgyzk1aT+7NCSKAPkZk0qdgpwhDD9r66P5zU4NafKtAmhR5hhOg9WRokY58hvJP

Ny1jZvOlX0q0zlz3H2AtXbPNoYR61JDP+6RZLKwCEYrPaRCSt/ls+hPMzih3RQ0a0dr+dX7NQjlY4TPV

TWeVRbO+hGpwrO/t+vnEjT4oViyVTRClttc68takJ+
iSZcf9bHseE4G5+C/G5XBy9wzHH08teu8R9+B7JEYhbReE0avIym+PRom9qM2fLUDjd0e+La644D75Yw

eMgnnRfwFx+WKkTExsSPkXu0y/+gJo79tbHOZsDeNszxFN/o3fUNEhKs+jfnsSe+zt94BPKY8QktS1xg

z4U2OdcaeqEXmehYrmGmb14gbveDqZWE9gwVM5HVjN
31cNC4ryBrjTlxQ4sSbEiar0z1F+sNVT8zTJHW5+9VkKKAItkEIvx6HnWSslJnnu6t5PKppy0082QlB9

u0jrwjwAlH64Y/WQ8sSso2aClnSksLXNtzKd8Gl1eup/fopMWtu6OBt72cgqyjZfmA6WqY0m+k9R71k+

twIUpgKBv89dYia69K80HOZteg6cK2h9VOnxqPoGb8
Z85oT4KxnkkbIJVx4YcfhMkEp+NlOxorVbopToMW+OHPWU71/RtV8O7IzSCy9NokeAIO9yZoLbiuAjgo

6558k3uZE+oL5J8kCrITxVLq/g1h0oN1opW0Q1g8SnB/zTbXsL3xrAn0Q1JV2zdFmVqB3zO+CUeWKtF9

/piNunF8qyr4L+F0tTq5ZV+z3L8FLIWpodN4CK3Vq6
NX+KYWUd01XneQZ/EhLp6ph12kA8LTUskO/27Ukz3fNz9tvYYkqxY1zaOkt8KT/jqRP5OBDMRgURxy6v

D3ZfSAI9r3uh7AaEBtiM0mW0aVis5SHZIs4kwQFUUUTambI5aLMP1ywr3bbFqmniIz+h3UHCwpX/LmcS

MDGNXoj2Fh0qhmp9Fzd1Ex1etFeGc0ogZ5i1Kmgj7l
1qNExXAaSJVu8hPC7rNWR3v1pq8F6SObY+DEGhF3+Jae8PP9b6eiFGgahPkzPcd48nlSY8ZziHpxnvYC

UsT0jUvuxkW02CeIfuKD5lx5MiEgfAPuqSm/0oz4SqTISKCFWq3Ak4z684mAsKPxvh4s7trodXswVI15

gJCL4fFyh6WPBUoa31xqs8v90y5CekmJWrLm1PO9gH
ozbwxMVTmjeLt8kwPdzaS8eijIlSrQRXwG/T14jKFH59J2VAZYaIPiBijX/JTEZP8kx4bHujT2f/QoU9

pmEL8J/H7az2+8it6xcUH7kP41Xfi1CS2uYzsR2xJuAdNFcrF5SKOxN1LrzwZwagntW54Sgy24Wryxfu

N+TB2WGxzrSPK1v+7Xqik+i4w8QljmswgKK1mn4yCE
w1CcuTbohPJjvZhKe6fujMEkuYpsROWNBYXdq676uny3WIKsxrpcc0ElSAoRZ4w32bsY8Kw3uTeb7YhF

FwuzKtZySNwBWptPPBKp3WMdtVMrusOIRyQ3b+lMYViuqqQ+vbDt2qZno6LL+PKirc/K4daxl4e3kTKm

Ea16jNlSou6DWbfxnfpiar0mpqdk9Y5udyLNwT7OBi
XZio+PadaOGdZETtJIlGJyVRupteMfaN4vJ/JaNohpn3Qki69z0xUtRMx5TXuybvW2ZRvyOrIh5rkum4

QI3Zjwk0ggtJiAIggbUYg3JQmZ7696+j6tFeyc1W3QqhrZfHZv2pdq4IyMrfRyO8cvJdqs8yY9HUtUDn

KQCE3322Mpk3wbGOXIqPYgsftI3V/ENqcFPatrz+GZ
1qsL04rKjyZ5Bv/PoPbJIr4ymWLSb/14nWf+DT4MZLfahJ7DYO5x1ND6nhrTmixKAbY1A/ZdPkzTzp7X

nJtK3bHO+hxMktPKWyjsDa6LjiTtk4x35jln+W84vbXJcn4tsVMThw3+wGmAokGgjnO8fzQAu3mPATxK

A2M269p3P2F+aRAe0aD8dOh8g2qfWdu7ElFp5brrIk
CPFMkBAhb61Eq4XF18N4lGVAAZ4UurDzQtOpgRTItR/iqWOLgB8p7oBk0UuksXRjvGnI+0fSuwJkrJqz

s6itzuvpsjirp55MHgmuC3S9Y6NDAQAkBClbxcsSptdL57C1CnKHu7Qp6ByzUh05MXgwGFYrckju6KeM

nVT8YO6dKcgt3nL6c/rd/WdwX1jMLlFBpJPEalVHau
bOw8kRpLk/oegjSzo79ote+z0pHzYU462VDH9roW7EdCCwCjT+ceKVpLyDHu5Tddy0ZCy6kznBSYEJsC

Ow/SFv0lNl1RV2+BAIZjIk1wLmemZmp0KW1wyZDYMay9jkYrPitdL1mTqRMemj8ZO/Cm55XseTor2E1d

Joaquin+X+T4oD3vP18W77UhnLfHI08pPw3OXa0D04mPQD
5JOuBIc1ak71PiEQEaO+WU1xU1aLiAiUl/z5EwlPHq/8BvltUf8ZCDr3Bpw45wUGNTHUk6T1IiI0kSID

gmIoYwSrpjoVVllJBJSGRCuqiz0d8Gk8fP4jxQc8e5yR/n1ITwjdh6rIJIBjYecH9Yb7bvPr4pCMDbd4

Tmdq9ylqyA79SsCRgewAiWEhOYWJiwnJbzcOrWlN7m
A3Vdt6h276dYjX/AoeCNtjzLnuaFyBhh38ypS6QOf6vLqDdDcBlLCaKYBZ13i9fsnj//uZkaQt/0L8xU

6AuzsHQX8xh6pClwUCl8Eh4g07H32mM1ruDs0yoANE9nOMOEhaCKw/WLjnYZhnDw4lePgey0PlMxTKIp

hhiLrdnt4Md5RDpFWB9qj5fmstobtiPUgK/a+E4cnV
3/m9MojYlCTLDMuBigLJqnQc3eyPnBbwFf2/UTyQJgn4w6Qau2H4DFOKy0f4Ax+KW/1BOTPf5JZWfDsR

BiZJHdplMOQizfaWG/WAna8W77trfMU+3RRYK3sj8708e8zm6cTb9gVnLUSByQ6+CadFK9wWbEwpJJK7

pSNHzq19OqqdHnLSwWLNzQnE773Fm9hetiEA4Yj+fz
1IlE6VQ1nzUiDpyPtLOcrmh1ue5g7ip6UYO5M7ZAnd70YcCuMJ2phBSLzrMVq5x06CC0r33qqZM5gpO/

d361SXjwxD2PBvX7Bnti5zFW4dYHZ6F/RWB9dPDeTMg9ejp8XjfBjIV/H4QbMcZd3r1Ndczk1rMDq8pK

oXATA2LHNYU1zxuUl+3fuyzcyaz+B0p9wWWtedDD8J
x6dELeoJbbWk2bLPe9RA3IkgA7iuQjJ7RmL8w6MZ2dwssJ7cWBa6F87E0DMk2Wvc+Moreno/6GslqM3U2H7

2pF92tLpvMNMJ58h/7ylJhHcOOkTft8Yu7spRG4d7yN5TfNlz1is602TTFwtzrWHGooXAg956/HqTGH5

mEikryHH7DqI66AjFHWKXTSwu8XCAaSEKW4dI3i3er
5jinEPjJXOpV3f8mIY3cfKNtUZVJrRjjRjyP+fLmhKgnaR4zo3QxJZoNj/Ylrm+gsg+guICTRBTtEaLN

2OfXUY9PjBX7HdKU3Qmjain5R4v7DOvosdvkFdUdyhTFO75BRdPrU2LfMh8730RVgOwS/5hApUEFAZ5o

81xPLAx20Uy+YN5tka6DFUSN2rSTEiPTZJbp87PdU2
kpp5b1eF2xlvlwGC9tFCwwH++Zsj05eczzQFW1nMXVsT5PNOjud87O5JB9e06cginRl0Pl4DThF2bmR5

sMLXtU6Ci8bOtazTpcLi9oGA9FsP3KGL8z4b5qswym3bMpe654YvcwlfY5HeMvDGE0020fdh56zohuTQ

/AtIoveWlkiSlUAKUqwdMLgk+MKlWnOrwWwi2QxfAF
gW+GLtQ8tb0Vv+JRBF8XMdp8wRAs29IeUVSXynMwmx8QIOy+WAFD6eeD6vP3vLAjayBdIeWOh9Yp9F5B

IWwoT6mXXreHcixuy1dxsp5ygUy3zPRK8cR7pYeKKLDtk0bgNCgZWSEuulbYd6tDzjXzQuSx2RZN3VnU

MSZ8HVe/l2KfWUn6VP3A01p01TOb2Lcpcq1HDJofP+
KvMm2pbc7oh5BVe2CnQ29n7Nhvi39HKPPN5CLCu2xg1GWr+ic/UY6UuaM+OxU6ouslE0zh7vMStMnCy2

fBjiHYGR5brfnESFx2nu0yPv72W3VDjAaZXp/DsdpXbJJTL9m9SvgCd9qyblY+R9WxPoIfD4n70mn6ud

9eDjOBcHZUS0WX+23HwSYnbW0P2T+n9vll1oo+MtPF
VflYgLTpaa6d9EbcgptCiHpdmTtjDmN6pdtDzEjmBfjlSGflapfHa9QC+xgHoHDZkB2Wlfs142dmuzNd

jSZDd+WwOlpAoBmSscAXETbcd8iJDxDuemT938VPvXjBDsS88nGIW8yhZwWTR/F1+88CsJn5AJoga8HO

HPFNrY4qF2Oty1U2/W63PAIgqUtomYYYJen/fQl/p1
sYuKId+4RvrvJyHIno2Ea/cf2NdlHfPuW3fwpRiF/1rq39fjlkoWeU8bTmfmrXR1db1e4rr8Fj3NBYdi

ifeHLTwZSUY0+mnQaygan+z0sGjXpeP2icrI/XziZ+mSpC0+IcKYMEusJGCPSaf93k2jlhKYcG0qIGVy

1unk5n7lT3ErvKfEAxFBE6VwTFTzlBdIcelmttBkh2
0m5cEky1rtHoj1G5W8BnM+R50Uclni2aNDepBuCyPCeT/G+riSBCi1JhTQxZJP6zCS8utv0LcNfukgvZ

RId6M/0f8qTsksPJgb7uPFD5xkM5WD3td6v07cus4ywWR/fTqosiwynU6jgurrtt3RFbXHQIepQCcRlQ

pj49fjgtslN3fBs/cLbuc3bT9rzufTc/X6czMTRVAt
ascbYjMVKAxszxsH/rjOMEPPYYsqwEsaqTxEOCR2+MFUqdhgn5Kpc6pi55NXol1iKzfPnfCOmEzDHfiC

aPis8uafATOPlPJPe2BMgPvPMC4tOJe6TK7Io37e4C0dcDmMEM6EnbCIur8dTssnUREnbNf/biDTqLV0

rD5iQ1tj+yptFGOuaVn8CLC9G3Tb3kZRQf5e8uyXuJ
Vl0GpP0OzHdcbyIlAAfYBEuUaWai3fvLfqnS0dI+13A/6cW2pSiv5w2oZmEvV+L/rxSmlt0NuLaLXEJU

rnuxVAHVntDRSzwVRqXrNX72k9SRsyRWWKZOiPJt6UTXPxvc1TcFqzoQwh4NEUlezTH/deXq0Pyx/he1

oPZ++19lrr+3mws40GumVrVXXW0hmKDDRa4MLK4SXg
/vloymi84DBLTOusZUPWz4FcuBXAurgIObY1Z/5dXWAI8JgD+GFiSB8ri1KN3lVCKjes/MxGlCiSgdx9

eBxj4H3tyn9GbTATZ0H80ja4gcB2Fyc4aP2NbszZn1AB8oM2+s3Ub9FFrW2dg/LLl/JkXGG4EdRRcwcP

bUPAeddX2P787ZlMjHMvGUd0vram/B/74moKs/lr9f
nQD+I6TGpRUAGQeP1X8m5KUdMM8z35PIHQkktha1KKrLlUfl1zo5b/2dMTQzF9Y6zgv33MjT/33R1hcE

hgAdH2/a1p/GFm6DH/sdbSZ+gsceRvMDeHMb8IOQHfC18arAlLs6VFUeb7VkwjF6NvPkM6MUi4zVMmrD

P0Cj22499R+ijVnSyr2VBn9TEOfUfOATqsXiUa0cjD
6ydddphYjwu4FuxNRHdTNon7UWPVsTJdGsvKIGgMTr1z1O2DtM5DCZTPiiBA21HSxYTPTXr1ASiorp5i

YiAM2ylXmWi+g03V8Wap/cvzVJH40XLTsI1MtDJIpC/frVc6cq+091lEsDAXtKLvVvCYlipFYKp2xv2Y

LgmKBesB8YbfKZuPt7bKhs47CZFukmDZwEFM359LZY
Oqnt3dsnEu4fyFo96lqpwDlZiOZmuoKQuf+EhXUaj2fwukm0wvyhngJ2kM/uxx1RjjItnjsQu/uyaULk

YdKJs/z2JIJ+Q/08j7rJ0/C28KIftFDGqUl1Hd68p0nr99RH8u9CI8q0KE7O2sJlLcaAe+uwK/oleKDU

7UUNNF6vI3RYcff99O7NwS88M61RVI237U8PEhRnyF
dv74qMOFCwU5qyl/JboOFDzrtWdqXhbGa/F4YETO3Fqpna5Isq6AX56NGaNY1seXUA9T+fqsx90rWpwr

k4Vf8HaEkgj6hAyIMr24RHTUvKbDlu1ZMY/wBC5A9p4OM1VvdZ2MTgjuvDXM6wuJdeiyDpDxTOK8oM8t

1OuCk6oBX8QRSAgq/8jBeDrs9kQOPVo4nuQBaXLDlM
e7KKGHgqYvG49h6ks4Yya9sIkBfuGhrk765WG3TfhbLu+IPyyMffst7LVUm8LY9FKrP8KuHu4Dgmd23A

LZbNwcVVm/E5nQfk/DM28c4bZZ2FmTUFFKTv5rz4t1m0bfQxGhx3HE8jTlIzIrrWVF6MsjP88SuCduQB

/XMOoQSvrJcEM9z44+6nNkodj3igr6Xx/pSGjGqwTP
BaMAcbyOnxpR45okR40FIBBTMvKfmK7zjTT2/i2c9ng19L8P8pmxkpRblwdlIkMZfid0Ff88yXfD7Yzf

jm4Zackx55x+2Ew4zIuRsS6tpYrvI9LymLsjzZkAt/5IqUo7U2mKOwbVL2hxO2Mp2O1y0UNjGeucCTGt

VqudK+7SXqR3OHLatmzvP0ia8LDoYm7fT92iE3eRLq
MfUSr9eA4BvNmyFq4MVNniHZvSGbdmyQbBc02J8xTSafK9ROaoKfr3S62NF26ApbFt5yoWfAdfkB8qAT

/wHfP+lXR/PRfCOY5KXzSbnAJfZ/6UepWVuUNRQ/qJ5kHNIv17tL019IZM6q6NwlBL21yepkmIxXPYTA

jeGwebF69sOTk96eFfw+ZZOIoRkwTbM2u50liYwN5u
WVyWRs17Iknnvn5csgfw6HFRw7S1h9dTQMuoygPRAv9Nwlwy/SkxBb6rb+3gnxl74p1F6yXj0x4Rfg0B

3hyDKunhqHCiDRy0bG6nq8j7KPNgFqfrJx5qegXaoSll7lBkASVdFNUQ8z4bdPjKk0yz/uZLAaZOrqZx

tlkF7hnJxzHjdHsshiDG1DkvrIsmLroGqZvHe0pocV
4jf5fUkmHrCQGCiXC/RsUkVv1AWFxRt2wVyxV0MdmjVkJXEGjibmR4X+4v8XfvWRCqCDu/Dfxr7PIat0

ljXxyX90cdF0ypL4F1Y2M71eACRK+jnJtGwjKXXC03AY0eGe4QLorYjAjTzrSEolLHQgCOSFK4s49eoj

7zAvP21Q32vXkLZ8N7ZYC0NK/32D5ebxrnSwQecLnH
qNrzTlN0lsd6wXSrIlBEOyGkCQRtUO4NBCjbeopF3GQ2xzOwlQ8nlhLz7pboGbhPgNGO+02XNIFS6sji

5KgGB7l8uuVBCnurlnSX0tliTZQ2m8keB2lkt5HwK3dhJdAMWixPGwf3BTYk8JUnAtA9kuICwq0LCS03

3VsCuxbdwFNeaPxoloxwYlAd8GfwhDSoKZQ4OwR45W
9YP+ONW+SW6aCWgV33sE6mLEZ/ASTNgOu4vOS3i6bpcMgZxaGoqxYeXxD7hmncoGlmJE0NXERcCV+GPG

EQwIfibAON1txEhuXoqcFrnIVB2KGJasijYUbTh+PhbtqgsyeS5E7RZovcWnwgieyQgQpBuktFJAhNsG

/IWHz0TMwg/pXeJjNbwKxOAV/uW/9zF77zSspFa8M4
DhRy/p/X4GqXfyUBnenkWvDHb+2rb6rA+Lhvmaa4ELMfff/Pu0C4H/z7GrCiyz0BGvMFGt7y2W8PsJ+a

R32dui9p7NVa5K8fmbafkm0RtJnMPOPjDQc5Hx5AiUA+UCXtrs/zpkFUtlz8FwtH1wA17ND+iyqsJ/sm

HL2v/UF+eQ8t+4HpNBHx74vqQ+s9vttLds6pVIaUX0
38J9RJaaup6nYIOTwgNtrBOZRkMEosvroRFthPIaHSs5N2huEvPKtOBpNL4GqG/zhqX2x1gL0dY2HgKG

UtuVaWoNxZ6woGcrnR0aly3xYGJduK13xZD8TH5gMaKbIRcw+f25W9lv9Dm6e/SpXcLvKOR3+FnrD6AE

hDLGdCH0eque+fS/N5JfHZ71FazIDFYTcWlIaypk1U
qIUkgAmB+7CDx8wb4X93ZS+oYVKYR/Bq4NA6MGa9qEJ2fsM3oyfRgU6r3jf8bZzcJ1g2oYxQlkk17uqZ

GfpE7GZ76I4ozqTgqYzmP1hdxQ5JZv9whOl5bO9dJ0oQ1C0RiTN56PLYgDFCDQioGycyUJo3K2+22SFM

cTGbhD5L8onGffihD5oX93/fmk2jPNMlDABhfof6fe
fpZuCz/0bahi1idJpfsAr6n1jI08bqnfNJKmB75hNFCpJ3U2furxu92Qt2viWRfaRjmibvkU6f5e2NgR

Y2VSpqiq6eQA6xuiDgtiUcCGCKBwR4owCDXpygcjnhDPZSmV/3iTY6rHfSk2bPZsasGF6/pWTP8m+cXj

w3E+7gBlh2cAcqW+vD48SWmsYCllzXtUk1DMW71nqc
BO1C6A4ikscc8g/Wv0JKe5somLNG2yQPJP+VmtEoKL1f8xNhHRg/gw+9J/iq3oPXSi1/LmTEJX0PWNLk

7XwSUnsNESR5pKL8vvD2pdYGoFwxsmQjfrkiMDcmofDbh3pk7E9nqxXBopw9ma2mBRJxeW22iZNPcDyT

jn5Dwq8sgtDAtiCXILsNLX5vwNYPyh5wDl2Uedtucp
bwltl1CLmVReNsGyZ6923JtqZNpC9ClsqQufu+K1wYqP5sBsmKWhhOlxaUcMuyEsQ/DV+ur0b26qPxw5

cBiz8R5tNSSRLXFW5vq7jYiq0b082AXWqISNGS5Uw/zriDTKTxl6Jeg0BFxueOUXeB1foWJJm7/NR75/

zTDfcxAVkieP/bsBek4oS9eFlaCUbGl/VU7BmUdF0e
oU20yCITA8Q6PofZ72bIlyMOGIC4JfSUxodeibSpjhYeO2X+1tXZINsFwsCh7o7Qgv6tNNxaQYtIF05c

IC2utuOvPsSY0WZ9cEug2/gOKKKQD5hiZOcUMgg9qzHxlfbUQZAVIPjEr1RFR3bFngwyipkC16+rHEqN

pbXcr84Z2rLpEJEF9TFrZ43nNnOkNSo/cIPt23rdmo
wHwJlrUUeJLdoJY/D5mqTRAvnAaPoQdIdixRqxQx9f5rHeYjlKhJfVbihOF8bhZPFPxo7MJXFTiKDisM

UvnGm+phSUrQb5SXO/9heiOr9kr9zF/szfv0TKuH7HcqXJ/5Q9bkFP3zDKASo7FPf9T5x3Q6M+20X5Kv

to9SICHf3OVotCDVkpazH0TnpBunR2fcNyqTnRSCPK
msfC0bxMDJtt4ib8V0fc/k6al5H/3O+NREpJF90dyvN3nIe3v46dXREVFm+m3ShF7JoClP8pQmy2y213

5y/+1jjix+Bp6VDGHFgi0W8IFhsTdt4o0n4QvflKFW3TDf/+3eH6Y4Kl/Uz73TrpGpnXJegFEfm/yqF+

01c8iVksJvlmvoDP/3l3HNbazYpA2W6uNKy8VD4ctw
V596OS0NG06x5r0LjmN4P08ftZbThDz5+AUV2c8IIU5o0fCIluXR1xGcMVTdQ9l7GD2eKcOiCjBicpm6

q2e/BAwjZmL9G4qXsMqyeUb8c5xYXWLkXs4NiJrxLwqil8mVM36FZKvdtedownZRUrCNRPgLgiP7GcxP

TarhBWCaFs7HBHo7GXEoVwBek/TV28bpqt56BzwK3U
NLeHsJMDo9g5x4w/ZJmTDl4OXI7Yd7Vp/otLC18dO8E7dMRjlh8xaCdPJsEQ5HbT0KDURjlsVyrRuPwp

A7eO5qlGhBEVmNat0V1KVvtfo06sw/7T8zOIOlRJWQoTSTeoSkftQ13Kx499uriAo4kYlace59Y6Q2Qb

d7sqbg6VmgGqgk+sZMZ4TWHuIXXVsyGeGZL8MUuGb3
Ld957emckWNaSGYKa2CRtpb0iiIthFBhbGsFOxukf6Vvq2z3BUNJWliZow4R4NuhRluuLQizLS8NzAch

1lZqeqa+1VrOItQ1fGCCCj/zPsJCLF3OwzbTm7+WEzDbBdMMJoB6P+tNO7Wy6IEK83f+qPAWrm+I3oN5

9GWXmh0RAiQ7D12gqUfoBycWodkLbG8PTYIZx9DYdg
s+lALdzDXFexoax0zwDNyfrhEOOJzXxiQ3fYyZdRKYHyZx5dxUW+3/J8s8mlPO5VsICuDlPrv2wj45UD

WSPbzxu0B0O4gP6DfNAfiXNofXBAMxYBkXzV8VmIsEMIZq8sO5qfdSENWcMPwwtZscq1uJJcmbZQWoWH

UFpsnw1roI96ruCe2hFvdV7StWZ/3rY25wjCFVg/cG
ztZHt4vVTU/IMsy6iIvVah0R96wutjhyZjBRIhn92pzL4F3u4ife0IUUiQ3UOm1D6ThSIF0jLFpMpFkX

J50pPJo1qnMPCOuxZFiQglo3Mkjb0g6XALXKLmrVlQTDgbt9pt6mvldZiOgjIFTw21T50AFT0I3snOHQ

nwwj/gLXVzmcsgcVr2EXNw6GOOptDPiYdurUz7iEmE
lpP5PV5ecKG0ppBfJj51hFSuh74PAVOQjmIzQvv53LoOTJNKA+oaKsr6F4W1bAIhxpQtavoNLuQcLA8l

tv5gh8ECUe1M07DA+n0q57+QydHnPmeQ/9LnBiYKYRpsa0g+Bfgv/O5GR4lRrav7XyMxpLGEHItaL8g/

WXMpnkgmvHeRjU4sP8EvY9R/+hyMAxc8kNy3OGOBL1
42tj1/ss017XcDxC5Q/iBSbo5farJzvT2icr0gu2yWPePsD2xj4vlU1dedhqEfpYivRy9JPUoZwbjgmF

N/fHE30/fupXu7Fv8rP83c0QgBinSRNqx7t0FlJtv8h5+aBKKAs3zK0grD/zTpEQkiaNH6VyMUO8KK9U

5fiReCxgDhqifBJ2RiMo/0+pSELpmY6A/4uiPpxVbP
aC2TvhBpqT11hbdm5PQz01/AXbovDLsO3eari7pwg6uq6PnSaXWTwcXY1WCpjR2GfMm5rlNfLd5KzTJF

NfNsE7yaLZdS94WKeTq/BdFI6yUfQ4W/xVGHoBDutIN6ldQEyy6DPufm9pXdhwMsVTqp+ZLMGMfCZdDc

l3h8wi9m7Z6XvD9JgtAxhjhHP20/EgOmONDqF1BpOD
0s8ApcVQ6aM3M9Hap0V6UI+LjlZ5W1KwCdx+OYV5umGZPlhshMghWbuCdoAsF13Oeo9FxUqsxTBnYC+F

+9KzJ3So9fcPI7+oYW46pi+wPZrzaX6eIFtBIUIXhfCGMr91x+WhR8L5CLf9SRlWFeQnzBF/cfxwmnyB

ZpF98f+A7JaAyGiN+r29gMDRP0gvEpMfLZqer6HKY/
X89JayvcDlbvgRL9dLd7FrGFfaVygrTiRTf/SP0sH/AMhlqLrg+tCFBvQZA3ykqzJPZgrwuB4URYoa6n

+HtWCgwue5W/jkNzJCEXerZWgPQ4vZuMTtvWiCyq9zhOmEo1gJUCqowBc6Sb2xQkYQlwCALOg7nEdx3r

4gVgPJFFG1dU/lFdmNvUrAmmKlGA4Wf7LlriXIeSDC
buAQ31+5iiGjqI+jC49mDMk8wPu/OGHH3Md82ld4L+7iMeK79CanbqnPIW1CigScj8LMRoJ0yJRrNlv5

7p1AnqHmUqUkUiLBrvugiJAUY3LtR2drYkXDna5bOGnQvPPAO0xH9i1hLwYfSqlNDLnO1tvXEqxLOini

ro4F48wf3X1wJGf72zLsa6fNIGw5l860Eav6ujCt4g
R2wGN4aKFcJpMkJQPcaZ4690O3mrmv233jPp8K/c/C5D35OiZOFCSCaTFF0o3JdTa1VcC/rGKU1lR4ZD

GGu2tYMS11THtPAJzNcxCfgkum3JTo9maVIsIQtGNgkx3Io7jVg6dZJe5GaqgbsRggGFQ3Dq0C3gDoYb

iSwTb4fs2zl0bTlgqhoFybrnyGmxRa2RVlI2IDkxb5
8OT3SY6QLmi3RSMAaz4PUCuPTNh2YM5YQGq9bS0xcb+mB5voS1zj4JYSfPJW3sb5tqJ4JpNFgtwlAtmY

cFjuRir1DymlZo5ddf/Az0/CVQJUrqUJlcLZkLAX3O9Y1MUeAo53XGGkNulTj+VYGeo7TcUH+dZHy4yU

+8D1Twm6d+zYp8oyU8s3R1e4ctPSur75TZ51dK1zAa
/uItrl8LejoK94prkJIdtAj8d/ufdrQ8DR+N94hJKG3wrUb9wA8UF29dX+dhvUZck3IRyCKS9RjLUAkX

VHUd17rX1Lab1fmqBNRIklCAr0BdBcwHM2/NIIcv1SKddnyc38JXgHtf/2VvUV0iBbRqaoKpv6CowYR1

qFnvibbQipdUOwAslcnHtG0q0vSFN36wcb7WQea+47
nFZu464LpfRA98KsQ/CHTRZbNt2MT9upoEAxSayDcgaQjI+0cwyNY4bdGmEMNEw6CTFrI2LVaMcuX9b2

aeHUetUsyI/XAkDi4U0mugcjmcf1Y6IrVfTg98y6PZ3kkbHXre/tJBkvnDomPrKwAje0dzfA8LBnNO6B

YLB5/5/udvoowpiDjxPVqR+ZYGUuq/SCMko0Ha+XGb
mwBEnnWWrIelEl2mj6h9cNBildGh5I3NIdVVpVvjksivqLbFuVruVVvnos4gq1QQtb3NKygiVgxtigir

tD5FqiIBgqOH5aEk49FHpt9QYqjIgotR53OvvVUsz4gh9Hpqsr5EH/0BjcWKqwkgZ13oDoGvltKhZkP1

9fPUswb6lWZkRSuX2WkK1pQFqtoEON8z/uZjXpp4di
vuATi/blTQRQkHSnoMwFIFtMjkmxWjhrbXTkNbjVZ7b+Pp+zMsGzXTBZDfdPqq8R8E0yyNfEcT43dD66

kOpEXp76/kCzVLuAfmmbKXXW5c1YQB+OVUVhQdz8nrJrJRZYUk6Fms9f4Lh8TKZTEhPs3zoaAfcfLA/T

PzrHv6wrPUJhp6URL3OMmvOQR+p2F8L58v8NNDhirS
kQtw0/xT4YiL4IgEKpQSSo5u2VYbehdQ6R3jK+gOefT02loORwjonjU4Xhlrmmmw/fk0HtJuI3l0TVZ/

Pvaxz49/Gww46p1ULTdaDSxVThMLE26nHisw79tkKaUj78Vzu+OuyzFYlPkdf7zQeW/KnRgBMglw1OnX

dRVgGGBzo482kpMu4RxLTGE0HYiZUZ9GGJQ3/ilXu3
K+RyRt5fyc9GCVR+1z4yz4Bq0XVTKRzRzg/HejA9fHNJQY12JB/j1jrDDrFKxRcBW+y5QTAz8vU7hNXq

+w8wSu1tdFBaj3c0f99alEN29LawUBMnkOAHQREdAB7NQAZ84SiJqBPiLy3PZbLgiFPfFOm7g/50YZvR

AHlAzh6P9Qos1z/WfdI0/i+D80HYJMH6gtwyFU1QLX
bUchM+70V2lhV91fx1rwGQT1VsaWKbVU+nKFasaaEXrrOPMmL/yyX2n/Dt88ZVfG17i+4iTCGUJ5kNso

mXOgkfEKnzP91Tkij/RXx+/A0c1ChzysQRqSjHkgkzZV83Yre/KxqBs9QCxyvdyESgV+09xhb3aQTJjg

lpWuOAAZ8nOcmKEmx2dUaFWGOA//DHY/lUiKVJwS6x
bOS1wQNUtItDhc5a9htY11scfO9gsSe1wRlVY6/tNE8M4hOAMfx2X5NLpqdPYt2xJsYXScmzXE3FZaW/

z/IBvtq5W5u3HSWUQC4YoRXkS9293s6BDlu4Z+EcA46Gj6yRuHfK7VILRnSKrrlodxEA5EBkAVAn3oOL

qLX/XT/ut1MK8fNNiq/XFCQ7Lb877DRjASpsbiWrc2
8S9w5EwDS2uCCVawO8rLitms7d1k7qFk6MuC769WAeNhI1SRGd2MwC4/e+I0dyNx1aYGDnGAGBvqoV9G

KBreeI966LkFvFFiZwz/RIExgRu5c9rM4x9oj9pF+CpjdW//MNTbQgcr7SA471rFDHvkg94mzJny+jVi

z3zBwUL04L5AU+H9+eApwd/BqDn4m1fy8Rhd0npNpp
GMUj/y1Uv3rdrW5v4Lh8+MQob3II3lZIoWJhwj87FHCRSqn6GgYozc7C03fYlnce7kGgMV9JBta6azsu

Ohq0HKvaFg0hnwh3zTx7RBoEoLfCCUtVjvIjflRvpGtB2JycKioErksH8AyHK/Dnrjxa95/9R/vgegl7

cXW6jr+HpeQ24uM+M07mNxiWhptTwTgkg/H+6mR4pv
WTxwwq5P2XxK4FM+esQteocyqa0GVn1BO3Hct7Sijw8WD3cPpkAPP+1z0KHG4XLsiT8y+C8W9HHDG1sl

1odzY5QtdstraQUzhpU1AF87P7rQs+9EBPoJZsHBCUAeORaQW4Iu2ui5Tr0iaDO2RWld3vI3qT0JA9BW

v6y7om2FuOoPD+sH4F3Aa5tlTE4NrH7OGGMS7whNaX
9YtprE24yoq16PJUNdRzo9fkTh7HsPUi4ocEm9MZ0wjCUgPHpwqYYUvyLuaKIe1MRuaGfEI4Qd0jHt4x

8tjYVHf37bUIF75oZ3tWdBRos4U75c/uE/9hlxmiK7sEmBVcoGwvnXeNMbgfGwfblkbgfCZq3X9OHBAh

rsl4IOtzPuGjijSqHrBdKy3PCHBvZ3xwW4OyjGIkRM
6brGJFVpatNRT2mrZO61JswKVF3poUr0hUsVh1+yQ9PvwQZPsFyAooJq572WuAgvblpygT69i0sVyMKY

OmPTwbfqndqvJBYsJTNy6He8d+eln046qekNHLrTju6zJVMIsj1SzpFKxScivUCD08vS88QnlZcMqSrX

iYvYEOOYv9fS1tLQvlpaYD4KyA7gPeiyhl1G4Mma6M
hbBWKhRqvhC+rNba6MmalvaXZrIzKJhwDW/DDCKXGctqm+/ezeloS5HkcBBBqloDtQXfWX9CVOY7DisG

12jF2FUI9VzOk2Uf69iMkV8R6NL7E0XNowj+61FLeUIJ+g+ykGoU6QnMCVw6Q6ZjW0849L02Ynmzx+wc

3mOi527X77Y831PEJgD5E+ikreNVmtyuo70K5KymtH
dCgXG+ZaTTzv2HBRaouukKUrhyCRb5UEHzRBOlqGJfh3ejVI4cFxiRCU1ykwQuB4HduZ/NuEycZDGSrb

CXfrYQj9Nx6CI/CYfxT29WovH//SI55dziJQj/Lus7CPHlNmCS+GaEy9l1N3i0ErQ6QHCazcdXgo5YPf

BQV6NyKS561VrxugOj4yjSRCUuwBMNvyvnK1hKvdP1
OLxyhW3lev+8ghUR0SG+YdzZLtws/7skQeWeYmKQbxlx+n+Kw/FpcRZu1qzwNslkBPR1X00hDuiBQkgU

oDkuh8W4epXwcXYN4mET82/X4DdkNet58P+Pr1tK/Y0H8hlpbF3H0e2/VPgI6YmPcaOdRj3AcjZl4w6y

Lv9Jncjlqj2rMaYJl9W0Bsue4tSTfmgtmftEesjkQQ
364S2+ss447Xib7JYXXF3TVk9kBuT4NuPfJWBV+DqX+NkvuIZScQ0Po3OdsKchzVl/buzge5xZ4nmxGb

t3Af1q5kPDREM8yogtFnsIdhGI8/7KdPTVA9+rxTmyY2RIQPFZT2GqdCe/zKYQvvnG2RanC0PJfNP/uL

nQMWJQ9amNFp1qk/zRCSktDVTxR4/7T0wFxAicl684
KsKRKljQqKURP37XuZf5FYqTG0zNhNqpDvtzx4X3xEZTWrcX3mqYF6sxokuQllvLd9IaP4DiGJfsM5Hu

chPrZMGiQVIEbkd/a0ZwnjLAGqB7NPfp/LckMK0vgJ5h30mlxTZ6fxF9/nymnRK5aSj9ioY7KJG3k18f

Kilo+QTemdv8N4jaZk6twD9Z4nWxg+FniWvlm2hPF485
I4P/cXfAuPGQHo4q9AC+Nr3/4aTjQeqkOmJcQFrEB97v0q3t0mAE8X+CyfujlfxB53c0cd5byTK57XqG

cOLhR8jujAmAmA4qgHyQWnM659LJR0Ez9+ZiYWX39MqbUaIa/WEEyJ9wtPsrs2LyhPR3NMskqU4ZZhyu

Ov/CCBu8yBGH/70Jr6gKWwKEGj7s1csipC2fh/O6h2
uHjAWm/hBXRyON2Nqn74PiT5Fz/hQ1htXc+QzxrcJbmXUaGwDXqO0N3GoqSJnSz28SLLRPFRRxeQoG1C

16TX4hNoECsahgi7kqA6mvbP8YcsK7cypH6cKPkvFRCxwbUHBT8IaMWjiULzYoqeKO4C8hb4hciGfozP

yzUn4TFT4V7Io0zKWnsiwpQj6uB0iKTqzfjAohzURP
uAdUdvVhRj/FwGV+33CIOO2i3D1qBjsPPuw4PhzGVptTv4RPvJW9W7isgHrSaQwSMWWEWq/s7dfFD9/y

BMPaHsE5GiiOi5zP3AASI5/6fdv4GCKj+HFAS+m4IMHX6+0kaUOZR1z04zR4xBgVkuhH46VO9eajdFIh

GDA3ECMSEbXVnvfkLrKmk6BruHlmVw1Znm5LR3Voyf
5hdQ+XCrHOl9UgY3RGW0pTjPI8Vt17pDqzaoc+ictZBblLUyADIYv0xOcfn+Jyv6s/qqb6nzyX7qC2TY

z5RibKORiFQYBoFzPylcBZ+GNErUtomd3JE47bNXbQ5M6KwqBGGEz3256li1rUx+JC1TvvwlzJ/FbJ3D

iVzutpQYj2GCD0aPi/1E6ztBgRDkk0diW6TeRpOGv0
gAMYqoVBZOgJ2Vjbg/Kc26BO4DEO3aO7cz7/oJb9ofz62GJZKoDDQn+esuNs86DDnpRTwLo6GCya1cF8

uhA+KeMKqvCVj6W+Li+udT415rJzRriltIPeFBdPozbip4EYTXY1k28UX+BASvIGtzelxdhohPhqP8r5

TUhGWPG+abun8tfo1VMVh+Q8P8bXr71evvb1D42/wF
+8Lqf8/imx2FQyR+/FivzmnkO0h8klZnCFUHcBvPaBPPbBpHcAyfZCtGqVjxsoZudfCVBUl5Hz/8oLpw

upDL3CU3cz5kXiMFJOvG0W8bB1qp/JZjeXoQLHwm98+52DoDYCDu6nx580pE6pgGvQeMkrG8kxU3hft5

R4EUyEVUUsC5whY7QaQZZPZkQwuwbKc+p5pj2RBODA
YJYZA9FlBasCFIiYmF9t+GoFT1VYfvQv75kSb208dVLrCdaUXq/3cr7R2RmcNRQ0Jnt54dlKEhIW9SBV

qm+yVyZcCxDu9lxawklUMGauuRoJ2Ywmxv200PklkOA2u7/KSroAHo/C5mKhl2cBJp1ucCzYP+8mVHPX

B1pahGbjg2J/T2FfZOMEOxJ+TejbMDznt34mjTw4/h
Jj1s9Zh1kdXyFCmsy6OelDczpwbVQ5C5aL9lUM0CofklC73q43APMxQ2ajQiXm5PuzhmTAGF74638Bum

wzfa7kCJ65rUBLQrYhrzWywQRVl/oka34JU1FSPVvu1b6XSnvyj2eWAsvW5eUryje4bCPB2ZsGbdZzb/

lUm2a2k6eZgkBKQfVVaFDjviPkgS1H8OWDNCUftfCb
K0H6xKRmF9O1IdHkYLb2sncSQ298ThDSSHVD8LKlqwdHJU1XIafrPvdaJHzI6UEZOrHEO1tPVROAfShG

nkF6aZGLUuTZymrjw+O7YnMYg6l7Oc7+b8ymRe52EHPDV6ize4NL66Tzw/ReQD8E67nc6JHLgaq/tPFK

n9oh/ytDxT8A5U15lC4AKGZymJZBP5S+eUGLL96SbQ
F59Ma1u9IH4UUAsTr2Cf/WmQdqq00xmRY765sJDoF9OwVriIn8zhYniZ0IKYf1YswD0k8lp3fu5HnZr2

biko911h/PFNc0EpRDJrQmiMa81XH3XKR/iR8CjzEi9OrOJiXg7yliniwFK2a8DshvjwR/CKciKSr2/x

hgqfQY7DY3fgzH5OJ1ZX0jsy6T++45vTMi1PMUIsY+
VLWz4iCqssDrwHjMKztf+Fk5YhacwD5cxhv1W3LRRNNy11LefjoC/yiRSBryTb3MV5MXqRdKr60BpraB

yzW5oRUnxVS3gTUXIiOJJa1Ad/8tn1ImykHMEAeSdbs1u/MGYuMFK/WAS9iUQZ5gohWQrwhJKcYyQZ7V

eyzKd5ClfCnZRdOIDmTWNJVV9Lk+6PjKBp3khPPAbZ
tjOaKZHi++2+kn5JPnXH9jjPp2b/WsbEScC8WLZFBQnVyuR8wrczgLwWD0EAcw+Jw4lEpz1bDoFP9euG

Gq6f1Xjf5fP3hNzzgsCoq/YjrJIbC7RqzJvpyHYMlgTlaUPMHmVixByo6Ab8eykCGJE4PwoYPEW52cm6

AC/hMby+A9vvsmvPA33QpEgp6upfdlnQE71NunMnEE
j+NxINFXyPKdIYW2C3aq/jhrdFRT5zZ9i3Tq6dDY9+f4YV7to5Q2dy8wmOSTnIBiN/McI5f+wRtBnmig

aAPLbyHKJBnYXopggFeA3Eya1Op8d7JLeaREyc0iMTNgW5wP0xucE/U8EaLyuodtOEdb9/xX2kgEnylP

SDcT7Ooync0+UMjtvFpWZwgWf7KYkgB0ytx9Hwan3A
xn8SznSWpgtXIRitMkdS81e9Ep8lW/ys6A4Gbuk/Xl3y4o3xL/omFK3S8l7u5WFS0SOfbK7ywCAPcyp+

9EoCwl0Icy7MPQYlrkfAxNkm8S4H3sdhFumiQwihswV/Y2tM/NH1p90eeMYxbYt/CQjcxwiinvKxssBU

isTGTZP1Hp8elz3aTKALV/kz9Ko8aj+X9o2vj/l7Hu
3MFi/elIyIv86h/Bu9sp5F+rG37Oy0VlVCHsHavl+gmAs0vqrbPyl8YD65idt9EMPDvWuwh7H3gRlMJ6

RDPBatiLAJ3yjwB1CWGddC1plfydsdR1a5Qv4WVvp2mVipOc/4xA3/VKztXLc0KHpgfG3/HdkNzsjymS

b9mzFDE4i2NQU9HdMWcLP1uQsGOsUJyPDLku+dq5yh
oiT8xoF69fMginhX/Xsf/TG3jr95ubgrzOOKb+gANlKW+21xgq4IN00dKLSIh1AExD1i/oEM6EiNZRdz

wrW5K54yM2ZRCxAA/CPury0zzyp3uQaLhjJJZMsvm+zX57TwNhZlGNeWSXwpypLnky9U5sOS/VZdXCGd

oZQP8JgN0SozsA2A5Lb3IsHPnnEsNhDxus0tlVEDQ5
6j2liqQcrdC3TX7CB0VSKDqAPXsbNdVAH19W/VN3QokszMdKsCd/+9qYb0mK0S5CnfU4iuPoIsgCKcyB

rg+qUExWhhOFInx0AoVQUlQDYwniFYF44zlhyKbs25F+0PsIZOizlsHElbEhu/M/LSYXkkh/VlTY7RTv

IwNqbrr9mFS7JF1d9LfdZ6QQ9qO2T2FXU24keywGLU
Pncn/YytlAQ979phlQ5H14ncDxypdh8dxlbywxu5He/EkA5cKcR+ryhw4iyc1ED5k78QZ8tjGjvXvOVo

ItfsZmVmjHFc57xqJeAf7Ap3AH57f5k9SjRpIY6XIdFiWLeS500f4gpxTtad8F9X5MSAnv2EdASuulm3

21ZEjyS/r1D1okDxr+mn1y/SywplYKNJsHrRTRwNVM
u0p2W/6F4BBHFhEP7/j06+lC2W8PJ2Vhzki5JvhFJkbfx2lsFVL0eAObVCPqnyTRJqymCzHiJlnm5I+E

vqDBGZNnrjxW4w/n4qaI0SBGe1KFzvhttI36sK21FVzKmoPc1qThvUgIMzjWmlHfbcreDvt5yN09NaQO

CGNFxOS0EGtyFJ+5SXmSAS4sK0gj+MJMtJc/5kBKP3
tHG/fqi4Z8qY3/xzSGmiDt28CzJ2jfp+yv2w+5Zgk809OVpuVdzHVmcPMh+fZ4cd9Ek7MdxHQygdq7pr

8Fgk0VKmwr/AcNeF7+K6V/IiTc5zKTLUNt6kXtsGYBr9qeAwfIaFU/HKB7z0wKSZyQmb3ZxBYmOtlG2f

BFeUf9QDl9ebJCFfNzShEnzL5DYg8wI3Dnz1+V39H4
EM9rs6TParHP2Kx/lNz1R6Apm5IMKAv4CQy9GDy5tp2ultmk6GJ/OVfs5pN9tnWxN3SEcscIegfCXj+H

I0HJmeGHAqvh2AEsVZPCZnHS+Lk0O/YXNRFKj1qd9+Bbnsjs9T58BH0TYR8D0kPmKftj9fxl9HorLF7z

JxoDjO4IgIPES+zS6CmXYdbo4IEdj8u2J7o3jG49C5
3TODUFP+6tJFaZdOINqvEcKFaszbBO/DgfmfwUU0vfPmK1PfSdir/0Rn6c/d6ylf/ZPsjOix2yd3LdYn

d+YaAVeCCrelPDHp1ib40bwz905ElFDv+BY8gz3DsM6+P20KHbaZEQwmw44Tv4/BiA5s6M4FWaB7stfH

UCgfRKwB5ZFS1ZbtQsHkJwj259hoxifrUHJAqxIMaj
SjWKqJcHpRIt2l07MIxLpRPt3E5484lJCRZzw0kVb+NnwgHKqz1QZeXtle6KUQCT6BmpE6Zw27IZsJmc

zbeo5AKsfIK9czE7oq2w9Ksyo+u2Hd1yY//CVYBRMNAo8YAnQftchf/KXdwWDWAj5SCjVdzadrBKloH0

O9tkEUoQL4ltSwDm93LAtuboRXHReceGl95ZImeTxF
jCzYa+3buaSOCwytcQ3SlWlzA6qADRryPZIIqdcRtIpQ9ltmujeH2M7GLUoEa85CKUnpNflr6l9mY3qE

gDO97u05oZhcukbFPS5Z594j+gN6/N4coKJTfhJdJZpUMvuaTqUZI2a1Xx8ZnmnSZeVN3IwuSAnG2uu4

rFxl+HHOtXY9YtfzP5yY1EYLw+EfmF/cmlTMLIz76K
9km/T7Fp/LFXgiYrxxHV4Amb02cQg1NR35NA6qkDUmv/dx9pvHEYlPtPEQ+XSl+6j8qtmvJywKECWv9h

QK6F9V/8lsOffdo8kV4eKDYo4VivNRlWvkru+jCVUhxzslpZfMj1nT2f2/GiM892bmTg9x7GcTxX5W6G

PV0K6ADMv/691mhgzA9veyxhy8KbQiT23g0JiXrO78
fYUo+suJqPU60frmD+f7OoaOmTVMl7p3NIgox2n5Ghp2+6JV2Axp5mxjdQpS6eooDod6vc6q2jtEzrTi

MIJ2n9MtwjmvhQWwmkhl9NgwprcFWjn5fwsOLIMvZy7+dAFwOYsWFG+d88KvW8qHFZcuj+x9NG6WeWNK

LVT9ztI9/w4JyiSLfyzDGgoNS4aM5XNihogI5G0ozt
UnNOZ1bZGHrB0MAwzit45Pq9IsodgAltuQx/3IrXzG5vFsie65Fr9m7aVjqrEm9ucELDhOSPXxCGtUcc

0qp2BW+QfGj5aQs+8/8iy8gBFDexR3YWWmj3AESioMTzP7k/h+RRg4mufLoYT7Mo4Y3PR8EuTBxzHC7U

AAVZn25X6+4QtyF6rDsYr0g4UsXP0joXg4gnbcRv/T
dU6vYQLLRx0k+qwFNcaqJSA20twBNeaAoScDwIB3e85CTZv42m7Gb+iECljjd4uhSv9IWbyhS4fIscDp

pfxKu0Yf0ivMhEtrLBzmpo6Alvd9/n+jrrpMsN/7/b8T/Ykmc4dcei1hXZmWkGEvVWT7xwvEoJF7aTBQ

xCNF2X/h1PRs+2cmd8/s8cmcTwfnRV4IJ/ixrJ1RW1
TGTS/s1p6poWXk0xzYxEnfGyxYSJl+yKpxJNRqxa5ixrxulcGW0hILc7w1Ks0EkY3FpRXK7P6cxMWnbC

J/Njnr/qWXwv4SLG4esK03wWpsEMH95H49vyc9645NZbUTdg5VYJWDh1Cf0kV8VfBB6whRPu+U2RwuEW

q2iBhnCkjAkXu2XWW6bDDTwM+c4nG11NiGRH7vcbiw
o7Vf7322OPc/uRWX7J5Zl2sLJLYsci/wLWUNflsxHfOHIsqL/Z0OlUN3KB3dyFFhG1wMpvedfONWnwYj

L4speaTC+mYgbp4DA7ki+25AJRflG5LUFOMy06ghU75lszbnYe9BnMGxFJAc7aJCCu36Jz0+PaGPiiZL

oL1IISIs91wx2YJ28c9ih+Co2kEoqqZIJt4nW9KGul
I89fYx8L4VyKASy6DrX7QsJ1TsQcumJNJ21lGhP4AKKkG/2SyZHmwZtQPN8ndRCe5sf48xh7imtNa6dv

4ZqwKeGq8xMfWQlYqSy/EZP37VzFr2p9o7ta4rmSB5vAM6YaNnu9biV31KdQg922iGZqbBCcMJl8MOr0

LVuQyPiKFJX72pvZBwWLsLtqUSHRgUyBGOk1vk1pYl
OyMnfg8jZchI2bgtGmGYBENL17CTjN7fHef+MYVz14oMDGkzrMGjVjvSOJxLRmjTvF3OiHHA01CBpNo5

HpdHeBn8I654JqUSAzzFPBhLYwGejx28DRU8VoZhuID1l0JkO9t/R+bE8KLHqcMpHgqyxrxDlh0ZH9yH

CKY+h4IvOIOKOab2lR+DG3yRDHdswsOG8opkmV6vnE
o6Ww2EcRyligFQjsASC1lEaVmnPuRz3LcumndqMjcdfTiBu/HSyaZKZtJDzBt9XpvszXhTUD9TsA9gdU

Zsqg1Kua/mx/VZpK0ZqHyrflq4+BwiiEBlxsPPl8tLFvNefFdO5Z397bjAjpKghy1nIYjaJr+WnsBFXS

png47BMk9luC8wgFnxWJXc8OlsEbOh/HFdUJGwiC3Y
p+06SdvKpMwm4lRw+NCeSOwecfSgXewML3Gr5CyHsA0RvMW+Q0f6S8LlgBKlNj3FgbTs9zDY/fqUgNBB

MI6xpUqCXfehjr0Isc5oKIiPCoEbePmTxRZnUJhYFNHffn8rXCddRxqSfA8F+2v3EnhJEtNBBk5V12dc

1nCQztnvF9TEbCzy8FUPlA/1fX+r/fEsC1qrKqxtve
7lV2ag/AfiOKU22bUrAjwiBvZdT2DkVNJ02hoOCvAJBLC+AJthX605jr7Gh6otRWu+hwvJERQrgTMr1y

AMumNtEOsRa9b/PKH1EPDJxtbsnu44ONlnJGV0ToIjfZ668bKEbtpeWlSfLnTAGG6Up81nkON7DtfCbc

M2XYJFQ2mEgd3LuzuLVQ2KxxsHVGoyNFrUedmjGMy7
DVdJZh8ZatuLfue5Npq2VPhx/4jqhTt1iyHZTMVTJUk6onPPzcxY5Gpfp6GbRbKB9tQacTMVU0w0hK/a

XEBqpYbK5kj07/aPhRIL1yVbgi6Ih0MT7s7vN4cuhL0akp2zx710RbEHlFA5q1FpINX0gpv6w+CTsSK0

1M2NVxgdyu/Bz43uTBrgUqBQWL6x4q6pXSBefvlnlj
cxu0FtEtu5N2os9GJFoir/doL/F1lg3eNIYhXy2zw17gR5oRWXIbbUHSZu97/qsyKrREc/ELms0+9wlt

CTbv3f/D34bfo1/rAktfhGuI8XQOdmP//VzDKALftyNjgLN3cf620q9b5Oau8xvWtqQdNXIc+axCJbOg

XQghoi97wj6BKHtkQMYSVrRoIqYQWj02FIMmVDU18k
kphCZtj4snJ6o3fYaMVanj/PHcbI+q++e8HZVXVxVaEy18yFOfX3x76jSvl3p9izaSMm2ET6gbm3Sefb

3eTK+lmjXccwotjfe1/FCXS9TqxYPqnByTfTyycfOoDiE7zttP44zivT77DP5I0NcMVIO+xCkusNPPCM

9OKVWSWhome+f8G//648h6qBet1BYvP+zSwIfYrIqh
IrZJEa0F0dMP+k+rNTlgVzQOHQC+hXq2HV9+gQkEx1AvhjPKrHyS9KypyiDbZyK414YiGeTocTtCFQvb

gihCM8ZWEMdNi9kYX681YO1AslG06GFOeo+PkKaoUpou7CWWzvHneomj+nLJ2mrQhOpxkrWvywWrJUZg

QSGhN1+uhblVZ3u6FvWCuQRGNULAZnILiEhBQaCaVy
nv+CkDJ8/kTeFL23eSgssJ/yz/lkdyBH475VO+J4UzG43yhCPBmQd0eo7OKrlFPhRpVRzc5ddzT9N+Or

Fw63nLbC2v1qDBiSc3e6phSIPEv40b089/vM+dSu33WP3DX5G0gjOZkPLMf4xnWt75hwnDPJduq5ihAp

Wjqy2mEjnwAo7gPvAlq4ZYab99vAHVC2hlRlN2I8Ny
cul6WuZWLmbbvvV+UEqdQHKe34HccLqvsy3QrX0C9Y4YLa1mM3ap4UtI/rw688dtgndR4KaiLGzAU2/V

+rkxP7FaKgdovb5qX5y1tosFAvcKFzHga+7nDWUhXM6OenPUS1anNDh9+T/p6HM+gz91vVdEK228m2bV

XJrVupeeTdkGv5eTbPrY6oHkoMJQXhCUFgJoX7GdO8
UsBlITW57uveumoDhkht3+38t3r0PoC26M4x6rGhPK7Fco2h1ZSNve8NARtb1554jEi8QLXT1AVPbXtj

CitMw2BmvgmZaEZ3/PYOVWpU9bYndz7hTaNnTQhMxsgybEHNXY25401a/PG1GrqsVNe5zsgR9IBxIojt

ma3WVBSjf2YoH90rRtoBOH4cuCT8jSTPSPK+QSwaDE
gOOUfEtEAZ6Be4ELtbxyfe2Oqf+azkFq2dDfaRbEjmQOiMx0PaioA9NdqcVPA5Vv/V+aiXJG6NKzovGj

PRuV1D3MMW7E6gk4qYJ/Yl3u18sMLD+sU059x3Z3rXK9y8bBTsMHp9oAgCL+5k6w/yMDMOPi7qgfdXku

+vRiYO/1OckyWL/Jj8LO1WH+K4hDQXnetKsmGxz8AH
hRu5dLBXEt6soR6lDoFwe87rw5FtSpY74nKnaYiimwSZ+OqaJC6y2pj/M2o5cafCDmhupY8G2v4HnlYd

a1KoKJ0ODy2ucr5fJbG5IshZudopw3E8881vjF3RpZ0jOaCYn9pNBkQXc1l6ie5oN/7O7pru+FkAD50K

mhV4z09ZqMJz/2zlqBEx8TlhqM92jhCnMaiKor5n0L
8XHm+LtLo9nH9bwRZ0r38zE/IjQq0HwRMv7V5QtgOk6EKZ6xSB2Njf9T8BDgAHK/tQa/wSkAWPu10a6L

oY72u/DtC8pXlY3OWbqRro/jDV+BqFwDp6Ow261qB2+HDPVdCtIn2YhQNEzUCYkNhpwYMSv4VdsczhM/

6IUFBt6EwC3OeivPYMSzPq5krxXrQ3RfTNJy8ne29B
acBx2JHsNI10UwB39E3fR5/WeUTSns3W3xZV1Rbyl7Ul7IsL7o+0mASarki7Z3lMVC2Deh0a44t+Nduq

XPd4qj1x7iGWXcCWxXSNH4kawLhP19VrybZyj5SX5ELdZN9tcJ6J+UybYFRILeEng+1oSog83TL2tS6O

pzfJImIZ7XIv5//MvvShjaRLK9UEypyeOVJAKfAPU5
eSkdhWiC2FG7P/1Tp7/PFtGmCL6yYv+u1I57mKeXivIrCaYlO+qc5BZXdqLvzapg3T7aNygeTZIJDJPD

Pca7d/QVjWoOszrq+JhX0FzO2az/us5V+lwsN7xvBS5ks09Sht+krE6FtDGlnH/68IxuDisfiHMknWyn

TLZzIMx++QBroTkDMcvbL5910WXCD+9fdu5EfBCnUv
57+gYzVCQHXp1qWS1zgCGbndDyN9AopSfMV8ftCUeV5au11BLxK+tH98OysfZWJnaZGUSCwo17nL9t7C

TmH5d4+Y3u+loJeZjM6CcQ4GvBOFiNYznJOLClPr9j8xTBtnmGJlZQkqat9I7AUJFdQjV/Hb3uN1luUZ

7UmHc7MNcyVgXJnUuoQcDDnJMcoz4vL9OZSwyVBOGj
1GEk5uzZgAT4Nt/CWtoqlfJNZaeShxm2tXxwz2otyEWY5zCF+uzrHuhU7f2jr8l5mXhnvpH/cjeBfQOq

fEGLXr+dAPJDqFygJQp+AV6Oq+TXpAVBF38sx+5fXV3Vl89IrR21BqqZwHbYKxTla4xhMZRue+6BoJQm

DCKfot4WfSosN/ReaBiYHck7eJo5eRPAFGVXbAqPfK
kuh2ApcUjtpVD49bqDtcsi1ssCZV+ucA30RZ5iOYvKn50WxXTmQK2FGXUeW5QMhKO50uxzNHz552tvFN

nWzs5miT04qHcAkFk+oEKznaQo6ezD3vLs6q/T50+BuOCmMqOUMHoUR/hU0OhT9EWnW0VH0Q6BRpbHU8

grCdMqiE7u63WqrHnpe89cYIW6/yuiLNfS1Xg9Alqa
20vqFQda2HScBmyKirwZ2yvqRrVoQzw2sc+DOzJMgO94yaQD4e9gLagNB6xEG7uXjikLBDl2zBKMqtYK

lkO2bhvZmBsmouf2FAavPYKCJG1Sv1c8+XNmhVVo3CB6/Qg86XrRUxD4JhZ++uzNpTgDqPfuxTTQWTbc

X/dvM8Yf402lc1yYbiE1B7mp48e95T1P9jorO9rD1L
YOdX3NOWHtXXOYgtP+3pNzuXTEVUamDeMn+941jS5RIR77nB+ZQYNl6ffbGSwtbjWhrj0gDtBcP+8B9Y

HYz0MpBnZyF55rDkOJ1eshj9rsoF6xG58zgbLU9eFQzlCIBP2cuXGuvl+X5mFZc66XrbIXatk6iSV09q

Q2U6VsFP8g7szI5OqKKk9poH3RcAT7nbwTXSVwpuZE
rPYh0IihnIzZvTwCKfpeSe6mZsw/Xfkjx6auMKIh1wbhSHwQXkY6sX4qyAh5cyayjYzkGbcCQqrrwkoX

TnuhUaKIQswn1uHmRP2OpiFoluw5m1csn6ikPsIIjSgDeBhTZugQ8KHt0ORKkSTWxo+1hWBu0XxOvvOU

17AVmN74X6oZxheInZWkzQhczWlJiIm9NDfP3YkGnA
1gTmb4v/Yye4m8dolfnhfU6wUSLYZx/5FZPlhzc81G8R+M9IHq/N4NPmL/6p8p4cwppALdvQogvbNzdY

IAwf/HQVLpltUjsBrNiQLAF3qOvj460EMAoBmw6E04fRHRHuuhsQdK21NKDZ3fSsL94ejVexUP4Myv3m

crxr+n/NYSLAejoTncNbGOKvYXG/fonbzxgf/jFhk2
jST4mQOguxVagnirD6s/uwjjLi0O95FYAPOZabq7SgtW3k2ivSa2o2jdNvgtBuecHHMxPX2jtqtvC0Nu

slb7Lv7HwKW2XQ8xXgSYGhmfAj4YHmIz9J5nn7It4Uc7QATRCawWxnbJnSjdyf5xLps6ySJkxaW4DLhw

7lU18P92WE+iwQz9sSjvsGhmQNTxnIdYqpoAX5ECUQ
ExBkiqgQyil464lk5kBVhV9j2z2gsWoLbjwNVNmtBzsWIpgJF53XsL4IhRmG4UVxrQl+DtqSopvcdmlN

Oex+FmnFOHh/Tdmw8ARSg9sO0YI9u/FvS4cV9aq9ceXpmbmZPBJuwOGZYZsO4TU2HqvFeGb3/eA/gGie

+Nl3hJAs1cKxSnkobrFkicl7L2BqEXAhoGncf8Dz3s
AJAfUvu/dJn9EHVJzmh8lSi2ljCeSeELKrZfS9vhFQTf+86ooxuRYgzH+2X4QjxmPWcMICHNAtVzt2j0

A8n7o4fMActApAs2nC9Sg8UgaZeTgERa0CiZL1HNhq+tBMkISw9H6s9kKmNltU1qwbv5c+guS2BJw6vp

B1OTJCjWCSTGA6bBMpLLCZYMRdQnU9YqymR1DRjfyK
KSJCxLWEBykgyLknfJmSWuxMv/UMH00b43rcbjd59dSeAQWxOiUIHi72yOZ/eZ0Ij/AonY/gxBR8AUgZ

p371+KEWSs2UX5XAG7zTJJR553arO2kjRTdcbFDPST/wz7cRI+t0RzuweHXPoFfd15H5YxyHKylkOlur

27h2n1hm93zGnGWuEffP7jaKPPks/yH3S6epvoMSQt
74Y6SvjM70kKVk2F//HjCvpRk//FVL7ncO8bVijbRNOj773AbG4FQHFDh9V7icFeZsn5H8mW4LWNXRhx

YPdxwp+u0NzFryC2ufZGq2MyP/D6ruQhS/qiqeTLvB4mtrGsE0cszvt/b4Mr+ggiSNp0mxXLmNC13m//

fa3+h//aHA/cLC8g4DKMoTJUJS+m9zKNWK/JFI0MHO
+cYSr1ndbwdtqZ5Opgkv4cyyCDasP+YRezL01yeHwiUM3eaDsiGht2Au0te9WLg+0soJ2D3Y9oSzA5GV

6WUv8CYabKq3Tc0+lhGmu5xYzUmicb+j2F8ZJf3US+cdW6MOjjNjBB1UkaFIjaGUEIpVzWH6+WQXwK2a

visu/gVuJp8tCZ2ICr4ka4pZYY7MS9rlLq10i2z+6P
P0iwuCAdGle2Db2QXcLysT2qs3kDf88QezhOSu7w6yGqTqVDGqrxhojDOjSUsLAfVbgH+YuobZbJnx43

XyrzHOaq7gSbYbtAHrLAmj5ISNB8VscDp2/eg/3Kbnw6LHRLNABEyBdHs+Mq5cUr7WMCOP6NOUG2hAeD

1aZdmCtAFNNj1kxpJnfyg4s54C6LeHlnejVn2KYc+b
b6FiWrxVwlyl+5RWPjSAyb0fVyfdGWksq17T3BcsS8xsKIftU9iGDn1i2YFxNVKfTH2Sg0K5Bk8AYT9d

iBumwqMo2Re2ZC0l4kIdGzm7NGbJjlXika4nfXWjeiapZaZYXsuJpStjIAVkRgJAw696Iytbv4xm8EsZ

Yr/uZa0u3TakrKTn5XCv/oGQA4hl4WJN6aF65eBSnC
xSIc5YDBVuLmf+04R77t0iU+vDs91d0MhgAdfTwa0uOg+yn//VbLqUb3KHQ41/MBvsCL+i8EMplLnJ48

0PWhmKi5vL7MABWBRleR+zvoJJOqkJn25QVb0vQzYDhZUvuqCZ3+BfaZNS3zyxIeq2MznG44nDhDW2ck

mlrb0JqDZtd4+MEO82YNr+a+UpS+Jkmrc3k1K83e3G
8ykcGsvNPC/rFA3p+bk1x1sYuIols6NcnLKCqdbhpQYTY+B8NuaNi5KsQCldLJXq3qB+CrOUus+puJ/K

CkcELiC1h+vqEIiYHRLdPmEkaNddBG+A9rumxFu/B+rV04nrQRRxm1GYDwB43gjd9rjJ09bcrhSk0wRW

PiId3S2ueRdKb54oleWgcU5Lzfs3ZORfCxSN0izg9+
fuFx6kyLr3r/3vYRlHxHmzyuLGjhO9F8Vdsc974L1hLau7EXbizX23BxZR2diAOEiblEvOuI76vug2cv

GNH0gvww+4dnjYTpels3Spz7dn+k+g/DlzQhKS57Vwu77DuXm9dx8XzlJFLf87Wgg246fC9RjmCpujXH

84hjlIuO8PAzhCeL1TG+wKr9NxpuKBKwDLivRB+GOz
vM/c1jerkT4pHiP/scRgVIf8Wt5QXYNehUO/83EEw02L6uzGbr4CJhDkyyWOLb57OgCLKSkmiIq1Caq3

BD3q3hOVmMUrzlBZ+uLRW296aVTPrOPbBFmwdr5/4/u9X/u2L5FssigMpgZbpcP4Xiq0WlWe9r+7uC8l

q+zrvJZKPUThCqroE9CkOq35pYjEOODLxXcT2iUkmW
FB+GTGtJ3YbMg6hjKWw1J9WsVJxCsN8tDV6UWW2zC+mIs10kzP4nFzXJYfoWu8eqA4Smp9Bu6cim+tSk

f05PLLgeB7BUCwBv4UBAtBlVdCPuVy7PCE8EAJ+uuXlhMuofppxPbavk2JAz5Xd/Abhishek+V2hcGhjUkVCaR

gcSOiBlnHC4KKqXXfniSRa3dn3UHR+GEYFU3efEQQQ
ZR7JOGc74yMnEW+iHRql/f01TQIHUkxOUP4Qv0taPSkQ9Cv6WsdfSIAw3aKdjrGzM/kgSyLRP8g+5F1p

kBtgS206M6No5ohqPlkDIcOyqbtGYU5U8xiJOFl6ds6Fq98Pk3ceKHoS5VEYSpgWy86xhdbdrM4aqutj

ZBcdHCbB9LJZKvsh42XlDUDcUxOKTkSDT0K8i520+l
8Nw1K5i7GYVRtmYAfUeAc/coR2V1WXSVQzXfhQmc6j+53bo3Ej7Lny1liszY0p/mfcINbn/FCKO+LF2W

OEdS55dQfdPJGpopTYx2JaT/zIOlhyz9mc2pQFtyUHDChD6bDmFWzjIGC1t/z9PW2FF2wNCW5l0eP5PQ

V6TG+hclpROqn65g2R1uHBNJbdP3GbicHQWj60nrTx
OikMZ3zKZr/xDwKJWNF0v9KVe5eDpTjW0uI+D+VFKufDQA7Fux3Eqq7YPYpLE/b9hM63VhdBGcv3gy5H

oMFBVAap+t3ji4kelO+frmYVFKX/4F0it++oulSO54oLdm9vPA4kdDO3fJj94V/wC2u2y0a83ZeKyNVv

P3iPjWp2IAPfhF8ms1dKyvrQbAV1mU7zQJxjhLyRKj
NoFC1b0fK8yURv2eS7K69atxPaUtAFO/MwJEPfK50/UBJSqZAGY4SCfza/MgU5johwdLz9ABpml65YPo

e3YkyxGuXyQBQrEop36+6qAHMXSyet+gTyz0C6qx8j0KMSgfA+us3Y5Zqq87IaaHVT7pEZmYb9vcDptr

B6kdvP+vkfbp/zWkTYB+M6+z0VFQzP+/4cHKzNY5k/
mXhnZ6Ye/oUDZmJ+Yx4XCjpFAveY0Tbjcs9xim3hASjJMgeM7ZrrRdmTsMlHKr89P8tCaeX8TWvTK3Wn

dH9zdZgNrCBzNQ4FGJrkLRG/TALijPhbsuh4KRy8EtHXJ+o2SkWpQawOus/+DOr8ny/P7TeUN+VAmc9I

0HiFQ3Y9FtI8Hxou4Afr4eRWckHWDNve7QVrfp+COd
iqrXoNMLBc7qLeQTzTigUoLI0TrO08z9Csuw1i7nUuwIOamsgFalmx1rR06v72LU51viRza1Lte8JjK7

Fb58ad6zjeOfFBR3JFhaaDtmhHHkKgmeluRB9sIlNCYruJbHbE+1erNKhNrUGKEalHFKHuQgIAIM1hvP

J29AhI8hLjiPERjjQCB6wBbteLHgdZnmgNpiYTmnOg
sNuD12GD0qfZf4GzsKxKlBKntJCXqXmvUTKPgwdJ8oPW6QdhtZ8PcPQe7pkLk90vEAdSnBPJQDg8DKl3

0FLBhnBWXZb/KU1GD4PZnkEBeeyHolZ0n6Jd47elfmtBf9XB+16NfaihIFyhZOZ7MccBcKz6oore4Rzb

/HpcOVBavtUnWVbDvKJnEzzbWDEDuKMTnkrs8G2+L8
asPJzik+f9XpxVbNmqh6J92kNDpCHOlUxd1E+SYxqLnfOT2f1Ef2Dso4XTDNwwVdtOcS3p5vFNG8vjVV

TP/eIpMKz5890zLkUaDbxSJCqIm11/wtOF29AcIhCgmUW/LsKUi7MN6RYJbzjtRmr2UXDN6b8xwyoYAN

YxaHT9ACiTRYQagzrrMZ2G0+PjSCgMToOy+gMNbQSp
6lAmq21JX28xzN9OT9/6ZDslJRdq0uvAZzm2HxNgLOYgEmNcJSnsaO9bO0Nk6UX6QZ8CS17I5wIvcLqk

Siux06oyFSlOhSBjcq0NcOKqHFf+EkltdwQbg8bXHRkupp9XeQYignNcw5ljZRB4e9AqqvV8HyY15puX

Rot8WgAFSSK2JAX4Wbpg4aGY/ahRf3xiUqvqsdCUE4
DcGkuTq3u2TmaBDKR4ooyC57JqhS1i4pD/mlJGZ/Fd0jBO2YrM4mU/om+QHgiDhPHsXlQlco180ySyNr

+w4z7DtJWtezavyTsP3D5sDzSVTK25D6AnYlXUA1ubAzVES5WOmsKYR7bn26w+kBlZRWEygNjCXuNmko

5fVN2iyjUCdPmu0MCj//q93lst1UgyC2Eaukp8+ml1
L5S135/8ceTC5xw89X+aMUSbqdmGja39LzflQKPqUGfVN18iZUnOF+Wb9JihiLgtmtcOCVqu8Hh45hkN

/RHr/SKNhkNFHDzCp/+LK4s9Vt0bdVpQUgbwwQe9l9uYZ292Gt9DWZpbaDgP6w1b26Z/5qv/Vv2K6FE0

zUNzK+0Me+e3tgdHdkm7FQU2/1TKFNA2xVt05vwiH4
XaDzk4dlzLHqxrjuUnM+hrFxrJLVvVZi+egs6Ax5+gLVyjtmUUbof3BR/J1eAMuXk5b8ahYaATH5BWrv

XwIkve2zKLYxcxsAMxMnTqh/kHjjC9U279nZ4aOZNpqvDqBMNUPoY3jjtigNcpFBqWTfaaZz+AslnBm4

uRfSoCGtjbJNT9HS2KbDrzNPUlZMQ/YPROzqetqIev
euFrgjjEy0gxNmCoPCpvtkhtXYTss8E854/SOdxMpxP8Vz+8yG9uSso7b52mcMW9UgnBTGu6tkoVXhsM

Kc3viORBTwc5zucSftseb5MN1WObFLKE3IVOHURETSh/+cAbAAcWWJZIFanURSJJ17dZp4UC6upa8qvu

JGcC1Xgwcsmn98zxqn2MQYVuJKJXI7/yyn8N2NcANv
tZUbfAfQRT0kViQfqbKzyoahU6ppVt5oLOSNAKpPUTUhHceIYQdFkhOSxMNir91oQ2Mr34vjbB1pt//b

lRMz4Q6WwXJKsG2RvPt71Gw4csu9oJB1koQeWYqEJ/F+RtDC1bchz13MmvXKMTWoHa1m/2myuDVbXEoq

Ki4aAom0oIFwq6wetTToW/FsAwdE4gUovJsvTpZPd/
aEfIh034gerQqEq361VjMX2VceEHUTmfjHgIZUrwwPD+zU6JuwK42Mi17+Gi8SKhDRKyM+BUDrcZDjZt

SGL575iY2ldiE+W704P6J5SPeEug4l5oq4fdY+zO9lwGkEuM9xb93I1CQPwcNjqwkriI0kxu1p/q4iwE

RRqHE18CQIvLc5KcUR8rDWjNKjtMH5UxMoHvh90H2F
tQ3y0VC30VKe5FFozOJd3w7ecjp+NG0Ca0LM8+QDR2AtXkehxwQnyMEd3nLLnryIZNed6/iyNOGLSfBz

kPlpN2iZOUOIwy10i9LPUBB6usc5AS9zDWiA2dRwHGIAccvdGW/liFEaIF44WbmW42akkDr7wEKnB9Ko

Rd+8B3xRXO9AlFLKrIfJr4GcgQRcUH9DkmgOsyTfhO
21YRVh7jg0Z9zSyH8oXpT32mcjUl0/3+NL9Yi9uZKYnCdQJ+HVJpsTP/Ha1vtv3BriHlDsCMsynwkE3N

OR4hKUy43agwCvRJ/zgGm4zJxr2DBK4WBd6uBIYT+p2jGmOEqNSEBcHeG0WiRTxWAijn7IEVm9+i7LgY

VKHQoobAcTt30WCN1db//8Tcbqq6chj2szonm00xQo
fHYCiH2M0mKjvMAES4O6//Hz10SQhMRK5/WBCmiXqJMF+goqr2LhjqwYLwax2ySmXxz5S+8alMD3OIXN

ZG69gAi+NxqGr+AzFqeArBz4c8y4bonGflSKzz9I2L/SekSmDWftGEOF3cCkhMtfDheHpxIfaYNmhQ0/

30IKmjVeY1t5LOL1iRSyk78Wry9GnZNq8vGmURB8/B
zaQ1SeWR6IB/AyHwBYvONL0d1Q321rO5zUkkHmRh1vpyn1SPR4D5WxUAvS1ku2TOZNWsCmxorLoc7nFG

7QCOfpwiPdCgkmMbTIfXBFxVp+RzTJpjleFDHw5zt121ApbsFxXT1WKf8fALkFw0F5QkHA2iC5To3PRr

nA1ucpRn718NK16kyBAvhecAk36JHSzWeoncd/EEG8
2P3NaflW7osTVIHtP1TV9prIv2A2nQ8kTDiEX2KVDcmrvJIYdL+XdI7e/67xOih8+pXX18Bb6h7aTIr0

5TFNKOLu3TVogk1LKN1jPMHcJ4djYMUSo4Zkv/hU2leyA+JHrI5yTDTq8varGJh+p4cO35bOZODLB6Dp

x8T29+DDxgxu3W964Idnm+ezsTwOP+m1y8M4YSEVxI
LuwWfGezvXKS3Ktg5zbUnA/9n4uuPMktVrofmP2ACjjSHzIFTRxiSmy9zYHL6rwSnKxoxnbVzUyZS/lE

Qxs51Eohy0EtvJY07/UZN6Psmk7/M1rbsBfzd9sf5uOoqWn7gjkItUwC67NYieKgA4SJzQ+kR21thGhG

ES4Sbb2pbBnv6m0kJcnYvqrvqFxwGL2A/S3xG67woV
8rUGrZL808bTXq2nQKGN6jmwXVGB8GUs+tqdfdakJ73/G8UFl51iukOiY1eQ7TvdBxGFDzWhk9mFShHq

QJ8Uw8bcvzndyiQrEad9FJsuG9VeReQOxB2f5ZJaNIFacxhyNlhfeSEcHjtmyDaBoTyzOotxlHtsByHl

coI3BlvVaTTEloYGZjs+q6KyulZYvqlKgvN7nDJOso
EVWSCNlu4ReB8T1s7MG48kY7Mq7wPJC+fsBN2KhC1ltFkLKxOdWiJr8IK4osrL3R8/QoMX4FWrgdZgVj

UrQ+0YyMydS1ZW3AwTojENdjG7CQCRpKWhA4nyj8+YetYWj59/+axSrmyly/chx/nWb0qloNbOhMMzqN

Q53ocg+yOjfdpWmLGJC8Athz3a+qWOFJqXZEtvyIQS
9CA62Cqdj28hGN4ikd3lmT1Iku1stCcVsBPvmOUKiK4kB8PmOMZO0yCVuR67v//KIb/JoCJ+Cgv8AfBD

zX0kbFmcCavZOPMKikrKI52zQJUxGMtgMlY8PuRVBoh4fCPIqsV8sOORCvi4LMKtpS1IOwBtlgCdfdue

fCPgSdSufIx7Wyn318khKkdIetHHTul3QsQcvCZ6eo
pMiM1P5lisJ0NBKNfFAUzKiQntout2A6f1DXUvY8khu/WBo6EiefA7059xr3Pe6HyAfpnfeUvw+SsF1j

pPb9+NBcmXyX/vuSeb4iSE3se6Y5JfNaG8rzUdQdaVKXT1uj5IKo74X4f+SogXBCQBk6ceLBy50l1xa7

aC4+Dpx7U5+kHWA0leR/cXriUuVIkw207Lo4P52Ecq
NWxngm71Z8625ggJ1erPPzNiAuFjBG7DdEV3DHm6YBVPuP6ltNreK88bnS508FvzNs4O6M/CTfIP+AQb

6eWG6XTnsmLfBxoK9515a88/jnsY/LFR0TjTeAHJemGoBz9g6VX4TIAM5pYyBd1esf0hScVdS3YRX8Az

qlbWovCyvXGrURZd+KnWkCK+JDElldJk4ZjAKfQ0mZ
ZaOOzyXaeNFg3VitoHY9pb5U6gqOvzSOUx4Z0nk+ZXN2bdz7KwYsMA/ca48/LjpUueBZv3/SbF8twqL8

hOlP3ScDhN+BEuHnzFMYOgWnN+tqN0bJT8rjSA1beTqglMYGDMRRd6INXvbAdl4LPvMPYEm6X8hVKJO0

4HBcY+cobQLmZ6gzPUmbB3VidUavflKe/yrlRxoEu+
VKIGTwrn6rKmM5gr1+mTPJT3o2jkKxiiYMqChoxKjjJv6uWxFaIevLiak4A35PkdAIp9UOc+0JwHUpY3

v5MxLDls/48icb0uQZB27Owt911cowPLGwfCv6vgUw+uC7lR0HA8TxO0pbqt/doDakco2JqMHuqfcMrE

mzZRbadLnj0CQ2ilvOFrIZcjCdNgixDFtFLGxwTfNa
jE8oqQpCWyARtBSk0meHJty3jMDWjmVZbh4occpiqryyBcIaWH5CZsQW7F5NmtpGa2ufm61V/7Y78vj9

5j/of/XllmfdT6pukOPjNvkvfnvz5/I4n8HZolEaBbd7P+tcyOtvlisX0UFKlyaUZq9x871PdSLRaY/p

Bj1rRVIusSgo7Zj46FugUBE2UC8N6qFS88w6MCkDTB
4IrUhr5B9ogmSu+/PneX1om2bZbdJ7HKtAqYeQvVYa5sWotJ5aa8E6TQKyLeSnBB3O7lWjp2K1MB4N8Z

3nxTv86VAeu0y7hN9hnzwp1BQQMciGUIylJaxgsCc/mYAuQHny2ViDdnQWFSQon3G+EpfjJNmvFnWpS8

aZCOsQVK8ZS2WMGS3cBv+69kD23YktKcLM/pMtsloc
OuA5wgcXHNcJmV81QyedOwHau1+OM6uV0X6l61LFKGTeMxmlldsGvc42xSzInLdiOeGysSgXsdps+Ljq

VEpsxCPtouzdn2Jhj23LHuiX1dr8A7GrHNMo27PEMG4hhhRQN0lepvzxXM0kOr4rp1CQvAUU7gcrwxFy

xsoiuJ+rszzKfhPP2/P0MyEGmm8RhxtIqp3qrQ51mf
c89RDGmXrCpxMI0LPVzqAWVPUkMYuODRX5AKI4HkwGrX4ESCgSkjh6j3Dslsv8FwYHXqNtZ36/3eJ+dg

XFFlHWsA7XF0Zds3B09lCzLQQ7yew6+XLJde5k0RLiuD7LS1i7tuJiDm4jQNubJbDLlgcvmpXkJ99adu

rQtqeJtvk1AuuILo8FKHpx86svLoyXCzMnBzDYLomc
O5SX69PYrqjpdsy/3jvQqneb0g94aDZZSBjvTpz118opZ9xT5O3ElFvNG2AthHjhkcaBqlMyJVZsRC9S

Pr0isZAYIjJ7j77g2UdoCQESOUCCBqIp8XVDawVhShkBgFI80+Bc9WbMbprJrKIq941b4X9aKmCSO9cS

TeCN9MlAFftiQlkmlwjlxLisAimwRtMCthSJsWtmW8
Ry8orpF94160JMY9oaxRDX0QMNBpw+HiAqxcVVu7+u7na2N35SEsheFyXtkOAPbkPFPObiaygV4AaooZ

LEI6jeMmt5fj25lcih5kJGrm19D3pKl3wPrOEmDjk7m3egcwiNABqJcNX+FYC9NNCqePHoGYOI89BYn9

ev+0zZ9UcjQhG995wXBnd14v+G3ov5doJRKkvhLi+c
mL//Heln/3J57PdETFzfNsR3eLpxqF1ZiHdi+REvLFpK8BEqjEQqfthE4AlnSXQuG0gOFtHDj5ZNtPYY

iR3RGfKO2+n+I4DWs0mipu0ZvRqOJWyrZfOxC7WvvZ514yC0VB/N56Oa0iaRyejTWz4eTw8iPKk2antn

px7nFlsvYxc3g8ojxvjo04WxtxmH8Ee1dcXMk2nS6n
r7TJe61IZZpt4/kHxDdHOo4mjbIEObAooaCyD+Pw3pxxTJ75wqVLxdoxQA+Q9BeQvQG8RR2Y/kyY1bd1

C01eqIyebheJ3bGdGKfSpvP8nIhO2rFOA1SuD+nq4zhtOH/EXUmPsP2/7Prw+UIsYo65xj7zsxjKu1hs

EOgQBol2bFuhsSGhkFwgVr1lUgdIhHD4sUMrY4Ixjg
HTqULB6sRbsK5rNp/FDXs73LzbZMMVS8mtlbvKAo3oGOXhxN13R4jR1AErKmGEOA99lDAFtURBeD8aLi

i82X07z6BjyHZKqUuIIsndcm2f64oTQCDWTzzlGEvBbfzGCol3uoPNJS7FhVjd5wvLuN3IpuWVPwur5J

0oqGLGtumkAvXRkktVRqlzNigLD2RcPBeZeJ5mNapi
ySs2+yIswxW/FKvYL3NBzuSzT976hjqHAwghLerj/uCeAqrQg+P6ndeGlOaQO6tzPjW4vQq6gzAVBpaC

xSc8OTCqv7HQT59okRmn22V/NweTrEVNiOMtPKHsWCOPRLnq9tZ8HVlhmTlE7UdeOxrk7bYfOClK6I+/

Jny+qNYxe4wz0//qUih4N4zlUuxRm66/qZN/Fuj9wz
/8wz/8wz/8wz/8wz/8wz/813IB6Z4NdlGE8ob4U40y7ocQz8qFikuDHoQP+bastKANxe4n1cOM6DZFpX

t8RpQKxg9Ez7DzAHIO6JaXRjw0O8fQlGNtC7u8z2+BgKohySPxHrDxI4EF1VzImRhP8hqpOtnqzamSEv

Connor/yaE5rbML2Br0TMMpbqFmyvwa3MzRqF/NacuQUo
2NokPBTiwgbn3j4jzyOSbJWPU0/Y3xajR9Jl7S0hSVcJHSpwegO9MgGV7QgN4ZQIF9W+P5owTmBLtVJq

UAqZNmarSLpSMOihvbvUjydF4NFQYUHGyG/zjEG1u+TXgtFrv/2FUv/c4RWIIl+D2/cVbwGFOqm/we71

sK/YBMY5pkcjiqPWgLzDccePxFfF3cukYPlHi+IEpX
3MiARQ+i/bk2kfZ27EQvdbgCKB6UEFo3C5nu09I+XrOf1AuUfghGfzrh0DU86DZ3Dz3gntQprGoL9Qhy

3UQVFoNM17MUcvKTdG0PP5bDSAm53FrX83Fa9m9QCpihYcA9knXcbFb8XnRmfNerTDcPj6yZc/B8AJKW

TzWGUmeEsnOx1+GM/MBGHhL9Isqxx4hl3Y9gHDV6PS
hLbhvI4PcG7Y+mSmKxISRBZJY2uW/AdWBYUBKZ31xkX+R3Uno0EEvfj9sfvlipJPAFS2r6y0HKuD8cr0

NlxwD7jSNNrNF9RU6ljj+XEc9uNF9RsuTh3+/xar6siDq/8M5gFmtlwqOetNd+WKbQfucuWcN71Vi5Wo

69Hcxm9UTnUzI3goTbTs1cdQRQKK9TcAQmDbMjW263
tswn4saTbUmJC/p16bzm9yWL8uzAt/OprSSl9ZL/wzUt7p7nOVsFsruc18zekuMe/dzclDEbq+aPMHB3

yWqFXYy1pb2MM85VtzzsZozKZYXGRFlWX1WRXulz8tExm9MuVq+5M45h3cDubMCfUt9wN1AZBOWeHxi4

WLdhxgwgKJf8Ldy1/xOck+7SkZKoN/Ql9ZyJXMhryw
xRDxQwj+h3h2dDVLCqOjKazHB419T7O8gQXAFZ71ksB8Z9NkbwAiE7n2U15hC1vaKcnxdyav3XOsFdTd

A6ywOvuIvdslomEF0rzJZwSLZF9cGo9KkSaHlvRNi2lvU7ULapDEAvP4C2bqnEPc4hVGkxumT12HZ5e1

SbsTRCu3a1xRqK3hbcl5W+lqEd1B0eF1pe2902NiWr
7n/h7tPWIw38uTcSioMr9HUkWD22ZV4CgDFn2Gs3iNzBi5jt9iYc3D/04cm/Cz1wfbOh/B3zGP2DHOXs

R71dW8XYUkonA1UiQGHV//hExTWGbnSoK2yQi3oA/650tV+k/6D2YHNBK2pdYLD1wUPhdXMfX4h9W5CU

Q6E6sN9RrAz4bOV3QULa/dUqSJPA4fw7+73Q1fFHEi
ehb9pMWl1Z7MkdLmWoM9pND8eYPtKVJDEyGPBRKctH5299MZm2n1bzgWac9kKQDHMOjUH3taiOpgB9y1

NcjHOX2B93TeFs9At7pyPi6U1Sc5sIC1sGU3yFjWD7nyFZ2AKDN6+xJqI2f4/+q+2G+ZOaca2cgF903t

PZyUr01oVw2yk46n8vwQQmnY4X8n6VxAT0hgOUcXjB
YmAUUQOfztLNsCj4tiBfdS9ljKrgE/T3ZM/cUgjXI5mgJXqa0q9mWO6mdby5DUbvpBU54yfQOXAL61vl

mFSiKNngKNI28u5S5mlh0bLkgnq4XGTWWMIyYxHFAzEAAyAM+NX9mz6E9MVi0Ye/iw3iUJfHRYmvmEFV

SCs3qke2mpu52Pr4KCaJxV3mC9FOVGDdhbKbBQUeuh
B+R3hhB+8wvMSGCufhbSlPmz/FtOQTQ/xm2WpyuU3V5BBN1LnbX7p3ss3qFaaQZjr7OG7iKeMqI5fTIh

W+zCdXReslasf9lrDySf9JgF3zTLqY5U8bpKbJYy6XH//cR26m7H5aJrC56acLoH4VZHu2vLhUU4B2Xk

hTV/fbPsr8M9zeZnuQcn8K5KlUFjfCupAta3+pVylU
znOuiP6x3VV5mDBEj0OBwNYZM2/jRXiQImMWTBjjEybEm4g+yKV/Avhp7Wr2yd9CEPVQPMd2tSY2z8JQ

fyaA4gOK0UHPe8fa0HSVuMfVAhkAn1b6Wf4McK+jINhC4zdH4vO6gKvpx/JOc0AEn4woOytPlK3nRvma

GZk/hHdpgCl+IkKk8QYvFPmHDyg6qr87Z65iiKLSUD
jxBAvEHfU44c8M4LW6SO3utuMHEAKkef9M6+qUGT3X9blPXgZ/bwOSCrp47YjBi7Z1ocF0B+5lAebDvv

H20RmDKdUfMu8vALQ0+0P8z4MK2hB5XwDWIDxkXsPA7d3u92XCyLdZhwNyYzjbaa6ihpySVIKXjp1GM7

c51thiTdlDnW+PONLdE5UaeC4W8gAhMN4LdGchh7z1
lhpZOm90hU6i0nhMz2OC6kaO6hafk8w67hVFlP/SzxomiVg2Pil8+OKJtsuif3+Qg5WVambI5vhYHI+b

Dthg78IbML/iJBt2d32B68J6lnL9POCsbD5rCOJ8csu7GmXVbZ493YMTp+VZez2SfIA3wC3gDJviPcqm

RiCZpnvu2rkeBQqFBwrOLHZlGZuxByDIKfj602Av3d
JoPLOyp1eOnOr/6qiHs1t4hJ45hykD7Nzut7yA2QfJokw/5IoBPEqfL+GbxMfdXYZH+8OfQRxz1Dp4ZM

b8GToBF5aa8ag7P+BSyrBWNEFHp+VOw7raRwIgFkDMgFXkQ9PEz/AFElPftL/At+BDdM6bFaLzGfzHny

l1xynw4v7LI2h41a+cCpVimz4gi2ADCvdr11yUPuGv
lPLvgUJmB0OgeNmh2evJErus53X65YgBr/oDt4mFKzoZyLb3E8o9M2tW0gkfoRh9xuKqKAGtBZIk0cXJ

I0gkCGN13J8MhWgu5JSg7wvJi9uvLIS4mQM19+ijQxrZSuywE599CxADOr6RZoQxHxT1zx/XwaPUS/Hi

WxBlc39r+48pfClG+H2Wm/Q1vJ+Wsxmf3M47XUdOJ5
1QQnXvIWYJJparDJony/jaD9UEAB52HzhiSQfrvQkXzKQoZpCjvycrq9Kqj/3Lwvs96YWWfdZTIWyZBh

WNECOMi4j6xV2RISRDP8CK3O+nMjOGY2oV6p3XtOMWfhQxgQ7F28ezz6I3BbA/fmtl7wixLBO3ziKhuc

O6k3CCieGbFBcwTz97Grcx5y5aJPfnj14/VuHKN8Zf
kq1KRn8A7nIReb45ozl9yImYIV1Eh8K/w7UVgaa2VaYwCJ3119yIgPBisfCAG707Ps6N3643H/47khH8

3e5jBVeD2YR3+ObyK/0KS2Nytre65pTqyU79oSpJ7rmdyXnsVknfrCrdeqe5+/1Ldo7vpymredc888P6

Ee72P3ki2KZ6bf+sAh8+uki+rrgWtp2E3Q+rNsnN+z
C+Y3vJISi2yitbe6OYwU6yf+RA7ZJHfBWcPoFIaX8kIjfKLIMkl7TlAvmaNNK+CufEpG5PzZy1r6EoFY

gzSklr+7sEdbDp7SJ7wTGVRjo6r5U7qZgkfHhUDNSru0OMG7ZZ0HuetOy09LZzZAyFOBcQWK0L5MPEbv

EnBSfxCDkpTDU5vDUtxZUjwZ8aqfOQUpG3URaFoxwV
djWcO5r0aLMOM0hrJDF/ggfS+k9ZM26DEimm5ia7RUmI9l2VBG/JfaO3im6/ujqj9GGapX2e77W9j3J7

hwcBvfQOHihHIEA2OdPh10R//xpKezVEOuz3pFaB1iGN6Z4WtGHA5HPm50z9INAMUxUn2CkTBuG41Ayr

tdSH6xsx/DeTUlC+cc2OF4WPnwPwjUYJ9gqjQJbBBX
Y9VszgnmONItZ8D8tKxARGkBaen0MjqE/0vCVweDRTL2Y4TCxb2QqZ81eA81AH4WmVssMkefK3NTuKj6

WI9gdGcbBX+0skGCCZKG9//1GmuJY1MI8lVNVHG3ZPbipy+Fg9zQnm0hyIpxY3wEy0O5qqcaH1db/uSI

PYA8pyYiH9XLjWg/ifjnSz2RIT4VwZH5E23OGXKk5T
LYTAezfBBTMG2TktAE8U7WAevvy5B6omIvDwz63+zPRMvBfuOBp5+QXaBZnz48BcRb3JknA6ikEwYjvX

rlLyNSBdl1/sji1F12YP7X/k69LKDZf0CjQXBkkrKTojqMqCxDsN2hhm5tvlHg+5TBZAQQCu/Hk4AeVp

vcxJZoejFcX2SleITc6OfVUlHHVadQCdMfKkwdtO1t
AVYYjgYo2+TpV4/ECBDgbQgX1T/uj3s7H5wx6xbMIdzZuyFvj8FvZxc81OjZ+SnHKlAdjEbomTiDP0fw

iYN68uMP7uvavymOO0/O0h861VexY481ADPPC4axJ3L90w88Ntv9XF458e1tF4V0ZV1g1w2zwePCNtfP

Jno5IEkugQkD8kq/GsjTPxtjpNwW4m3tJW1QXwpFMm
5IFkK+08K1dcfomzehzlYqJ3+1YqaLX1McZXVFFIUoS8EWZkpQ1owwdDxyJp+Zixl+7j4p+0oCdWTHy0

P/v9NW59G6nSvj5+2hrAaK2cXYna7yb//vR0t4FsG6defNOLo2S+sIIr9BiYOaO6LdOkEU7lbUbgyEcC

CC7I3znyi7wpAXOzjS4YrUsftaeyvZJOeKBTdAmFQH
IqfC5h1kVQIJ+oR0DbiY/bfAuszlAHl/q7JnOJSeclk/pvISlAAEcTGW5OiWmgvxe8D8KXdTKb4VvXgF

+ODQ6/Q3qVrWVW9F4Q6q2103W8Cq7pyoinbqnZDxtOrMXQMNVswqEQ1G8MV/cT/bdZEp+03XY+CRLrDw

UgWwPfZKGOPEKXhFtDkHo5mPhieiXRqxxmTkAuBGwX
6xXC3SF99PXopIrJky1WGRrCkGrMNky7zamAkKrsifYG5AK/9BqFNQQE2RTwEqfH/V+5YjpOdcT0cmgI

HR+plkTn/4W7qm7rjaD/l7KN/h+SfWV+On/uoD7Aw+9ESj4PUo8n7JupPEQO+puphI0M2Gdeiq+YQzg2

SCZk23rIpqqK/EnKzKUhf09bpR4LLpyar5fKS7IqxZ
4frLL/NBlEDJMTThUjmTo5e6Q54SC97cEpTg/bawkT+eRZoh5uoh90WP5xANWxmXVgZ3gXgZN1pKFn2D

cAiEciUn65ymfLbr2sjfpYZBiDEUQg5vDkqhc5gg/KkLSacJLuKF05DPeah4XCmk2eKAl72FZsUHzKgS

NghnHBC5MNfxTEPLTMfpBAJxXnsVRLHO+JAbn994V1
iYH0vG6283xRZF6PrrDiERziOxCybw31MT9EOMEhWZJ1MFZqPS78cHI6PI6QB7mFZdptBJLQ+hslkkvr

W7Suu9IBVSDzym0oS+BlfPvvX0/udztMi8oCsIXkg2Qk9TSuSfy+NBesnEcy8P4hBitHypiTIwbvepCO

RaGkPqqgr9m+jAsac9NYeZf4WtGk4kR4eg9zbcwYhF
NC9iXFDUJGtUGOJGkDpDLNgIQoO/CAS5tn3AZTOvADm6mNRCEIxs/TMwzGgO9tdN3h9r3j+Ww8f7Cidk

MW7IX4WwEEe/fhpX3Ac58WI8V9kW63xVMG5O50/CfYZxZcl7twWLhcTxruOgojZtZSF98EYG3X1agvR/

5nUX9Yv0xhTmZnJb5QbnDkeo0NVTfxgZHRxuWRu8dr
4Ugqkp4y8vHbJ1d9NqSfsukbJSAKwEZFtTOe2i8V6m3T4CbTW2MageKWT9Jl39uJf2V9Hw9jvuX/7R0O

HQkv4y9greRssGJbVknOguazS0WFR39xreLvmiikq9ysJwMKwNS8V4rcBPC6L6ek/qz1UMHZcV+cegpw

2m266QLbuvGXDVHNMosZnDz5neU46a12Fhf/glkHpP
RNlaGj82O9ZTPuUsG+ty25oMjuqoZ2/iGj/9w6wAHohRIKdEslXzUH+FJna49Ewm7ynFTGo8oMahG3qq

0XRBw/tpFvG74w9XK4ilq1CKx5M0ax8kSNe1afHSlGa4SR2dT6x45XTSOlyO/zw87rVetgQQHlJ8lZ7j

FxCfoSIWtQ85U9nh8wjLZWEd69Mlkl0VvbuopFh8QN
y81bk7fLeQVk2wvK07SaRBUf4sUsMnYCFb/rQruV2VMjpxc+BVMjsKmlMVMH/6A4+mYzcLPbNM8q+DwR

Y4Qt0ytBivnd9kjMPIS7Tkq/n82LpkMY7L9GbU7+0D+y6YlDuuY2iMAL4h3+YWlwTGcQm6nRz6/76FEk

dIf41wwuSXowURA3mXr/rdd7+mGitgp15eJpHpk99n
LajaGsw7wei9oj2K95NGaihj4/ou4Uxs5M5AxHL4pkN6+fLw6GFJfwHthVnFD5BdUI5S5w4qK3pkkGmv

Bwu7fnTnvb4y6ASsiB1AT8DBXBRqh6TX8bd9DbTipKJ5jryVLfLycD0NthqGHA2Tx+1/7BFeLzg3PB7X

NLNHtVzhZv570BUam9V8l98I2YPOULxTxePwFerajU
CGUl0O4ZqJzu3j40zqBFJdY0J6cBnPKl1OahpDF8inuU6wv1q+duwSXhdn1QEcdMih7xo5X8hzoEkY4e

38fNu4KcPEMLvNnzUZkiobBxLIFq38mlDKkl9Ee4jzYxvcZ7I8j/Qeq9m5vNO3KS8YsnnKhiIoTRbmJ+

BNugh87TVA6MN7FO+1ZnUuNvJNybejaUIdoTdK6BVr
kJQvdyap+CBcOi9KFbboScJmpF4F7RmBG6V46a4J7Y6gz1ZadE5bi+jg43j5VW65yB9aGCmP0czXf7lF

SjxdosGroX117PSvpCPWPa4X7attx6eGqOQhDeCYGi3rzCDEdzACnVyR66yY6I4Ud7/GWrm1eyAxXpvf

ybTyMoCKHvac/fCINffqBR+q/F92nNY66i0+axP4zJ
ejfENMcxaRdAqqhjRdKW4gZhBDRkf2juZ6m7YB7YmqmWCLQd4+4whwRHKtdLXhKxWOtF38ajWzcxJn72

ckh846L3Y2LrByrTPcq32gFvrDnFOG9hvCgGczQsqBhZouoMTZGmUDoobJs8qUGKYKRwivIyOLKeZHoU

SpWafnPEAxRZbwVSdIO7ZDVmsLgT2T5VnKoPFNqlTx
GUmezZo1iNKP1KOOBxQpOQQmGGUwuCZwQePw3McZ+CufUdDS+voVj5nDAuS3XZ0MfgDtmvIhx+jG+KkU

fRaj4czfVm2e1gMZvUyKPOikSvMgBKbKApZKCqCyZ3UqWmAEXCPc0TDxFDsEKW1Me1OxczovvRajz9vr

1NAp7MDAnEi+cLaiEajRUjVFk5jHUcWAHf4cLmkaj2
12/ormVaXTnjrJAp+1piwu/vcK389LfSjN6S1q9oq3c48r5ruxZmoVwTW/IJEBMyuEgdODrDiRpIK9Zq

0Jdxvi2//F0z+LpnbhNnVs90EYSJ+XkAYKrYCIJDJhZ/Ac1sYP9nVbvz3LjnXz5yvMB4ZFpMAQj4/+vo

rawQlPWs39yWdAKwFt+9vvNme4NiiNgo86jnbPDeqO
G399GcEdyK1/RG0AbnSqC707EfT0GDhpWBf0crFnxbdafNQv1i1ejwdcwa3tvRqgQbFMB9/vcjkvSSRB

KftErgZg+YoJI6U+x1CFABgeJGAowBqkjxZCYbdFIYi/3p3TexOpH61ImEvyz0H7xvb+CINZxTja+Hsj

g9/diuJibrcTvtHGkZQ0RyouvL3TnkjpQOW/Levkyq
Z9t6K6Y08IYU5pJEvq8N4a6pfP047SPJAk45BI8s35WM9JZkWm9h7KsjbSbJ1lNOdBb3e8TDtAd6vULM

48dCxGGZ1/UE4o1qOOydmh8HeOye28QbOu7o9UyVUsWXR/n6SiKQ6IszZt9iS3iy7JabroXLenziFcxf

5ToKhxsU29OmThzNPEtSYux9H7h4sZjRGUyDyEyzBD
+9EMdHK3UjmZi/13oyo6704Vjg26HUlvK04919MkdrLWMB0uj3xZAD3PIOIPj/nZgwqlzFz7Ig0WdYjG

OFS+qWJa2Gim8G2QfruyGqvPH/3cvnrXadqrFc1mumjlVSL+wEfXR/izkKlXYMlUDDI2v6qnsg0AVDvK

6qn5FJrXXHpNQhxCyonF4T183ZFTW5+AesCjegu5wf
DDhsUHj90EMXDWxIT57UfGeg3skE1ySnnBeiN2M6dJLBLswZ9XnZUpD/yaJTYZHxrR9z+Stw5NUDIvdW

9MLUz/cEgwRPqHOoXn1NwTU3rtM8pHJFYPWs354c5H8fI1Ta9nyDcPb3Zy/7BoHPVbvuGKaphAyKDdsj

avRLu3Xfr4W3OvNB+vriFf1BIJ+bUgt5oiYwD8e83p
kQ+rIYKnR3b5T9MMzaR9xujk9upTSLg3Dpqj688IbQ6/omv7IjsxzPEjG9fGzKsU3draTNgg7WDkKosw

nqzxvMoeNW/lCxyHZY0Lzhc4CRgX9b5ZA1Ht6X/E9KexsFTXQgpR73dNFyr7/HZdRlNdS/Mxz25A9QqU

J+sT7xS10TURs31rUSd1fg/i/Lgt1rD69p47ls9p9L
ZM0w4tUFcUqT44ZMobsjex3py5TdPfN10UsQBkirRE9yHMxxtxVZ7eVxGVhK+u2CTqymm7vPno6PPQOX

BuRrxXMc5ZVw2qu5sHuBvjV0hfihpqqg1rz05OnXhMx/EJP7NNl+qJjeUrpHbC5jkeTDfSdzBdjVEyxk

HsjEnHvHfeQeF97L0sKsVaoUYJmRPKC5D48sWb2Qoo
B+GIDd4g7mn6TflBNphRhH6gOkQYlOpJlD3rQzghRJ7h8PkLQcE5DKTfMvSnCY4nZJAMn3dfb0EIYp6D

1kyF260I0oPem4JunTTCfAD4z2JzfbRtddrq6kCerVY2PA9ERMGlk2qNGZW27d3QGT768E8sSYM73Geq

D2UD0vbj0O6BULGZmnrZw70vRWisp9ouE2HbdtiLIc
iPdypNp6AY13xrjHH/AZZ9u1pbixU/JMF/7xUHIs8sMAl9rlN6CUfcDeAXuFK93qsXyWIKP4gPTK8lOl

w7Saf1t/vq7fG9Qj0oY5XQVkWlh0s0ucAsoRSugNgx3GKJlSQGL74/etek06ApLp8fXKal3p0ncOFsXg

6OS65W6UmdtzZ0MOAa4yqjVz8dsb4pt/9uIjdCERqg
7RSN4TrKUx/HkAOkJzmu/Qu6NnHotdZ0OL5J891IqgGa3LysG4nubuya63LF5J79NdJQ8TV46r5N7Sg3

Wz5Sjg7xjUJ+zx5J0wSbY3akUbLhWCZ3zwSfUF1rqVYF62H6Ik7M5YwcTfbiYeO9b1O000bP21uw6Abn

5QMvY7isaELY287jEqG3aN/URRxa9Zh2wVjzGKltcT
0oDJ/UovdK+xVH+SJlTdpMRREUfIbWCpQt8htG6wEiCEXLupmD3Ki4sEHQGEB0z/q4JscrCdT6Zok6dM

zoNxEFVxIOCKkkS4bg3Ujp2in5gd92JwB1PC9Cos4367dm5DYyDWqhPyGDAbuHceV4bucp5GXy8OEeQp

6uffJfdNy3qPkn8pjIpFqMAwY6MLB8T/vSFJDUZ0b8
Ln3n+zD3QWtqRYfxgtLpnbjLNY2lx6oLMlyQkR9EMV5OCO4oBlp/yi76xucTHJpOW3SGiuMQo70S/XDF

dvqPlGRa5NbGrEengJIv8Oz/pTByrj0SYQNEuBY2w5Xa4re2VBsRfIcEcqIzeSAKSJSEiwCtXvGAt+DV

L5uEpGj8E2I0oTCceGmn1oBEcQe1azhQQP2jdDy0bq
y78XHdZqR2vxYKtiwow9N6FhtHdVYp+3WYtw2kFh8OJxNuuExFm/FjakgCPWB5e5QbNdi1tKuThAvnVR

u2teA1+VLygwTi3GtkInAjWyoXI+rh26TcxujKgX0Z1E6iLGt1c4rfr5EpoSTcbhVEikAoJTnkiaGXML

f3iLeQG/08S5pqKLz9O/OV3ez23Q5IHG4uBfhhzqfS
Phl1NRHt6RQjnR5R4I7/Rnp0Kcg+Hivje5rm1/9nOHcWi1P3RoDkZ2LoKcop9t4st6PzsUnpvluqnXNt

8NYMkSEQj8WtBnE/GRiwncRtQBFctMfy3E9L6GpaIjgK+Lo44wVyXZinulYAe7Jd9BRLzrd8iqKwbrFm

Z6KwMJi9WlC5Z2zzrT+jWwGexycgNuuFPuzRIT8PxC
CE/tSauSdYqDUWcioNOiPa3D7DtJ5GbKZDWfVvMCKD2PCzjWREicJ7NWDeCGUXw8fyeODDovtc0ccbV6

gLZz0jeD/PKvS8MyHdZ7ak6R5QiNkO1M435YlQ53ey9K0YyuCi/13p86FWRGx3y/uuNnitcV0EGM1sZG

yxovVkwQT7WuL417kSuu9Uk+D5itoprTdDxR6s8d+R
NY8boaZpl87JJ8Bd4443tNdh3VbQ+kIS1Oi5eKhS4ouibGVbIIpp63I4pRCqh5fd7uUd9jTphsHjMkYN

MJIgoVJF9LhfyGC0mv6Quf64aX8FB3nYNuMk++/3wZe9FxeMb5/QLXasmhRkY6jwVLt4V4Djtj44RGZ0

XZpFtNd8ja8r3+4uIKWBOiPlrme9oBKZTd22cns5w2
AQroodR/uTiopEDoLp+aajC2p4/oU7IXjfOGwCCngZsS+N2kA6WOIZdwvitJaMzyc6JY+PeC+RYf1Gsn

0UzhNugD3mrYJX8Myd3FTuvsuWX+tTmvVbAImwBt/9Yi2cXS3pIIULDAV+jCLntKLcFi49dCMrsnP5Pm

qghbPziZFqfZ334FTqO/BiFcGoiDk7g98f/R1ST4aA
MdwdrhZZyhi+iR6TC1tTYryjnOLvZanC2MHVaEDLP93a0tYbOdSSMbWBDi2j/gmEOdJG6oFkHbh1fr3I

zf8d+JcmNmV9b9BSUXiF4EfJTg9SyL3rsolA4hvVqzMg9kob3PjPueG5/sQrdvyiC4/LT0R6iC4S1ASt

xtDQP4hv7sQuhTB67R4zzZyXFQ3hM8ZKoukzTj1dEk
nYIj14+wZrKRPwlnp3XXdG2xmevb8iI62UAf/RiR/Zy5EP07V4GD3rVPzyQgUczK3bo8H9IjG4S0EJ+Z

VwfV1h9RJuhOIALhjXTnQRK0rHaL3JwdYcNIAj3/J0x/bgEAbSpvDkaWxq5DBuVerw3OUnqN88jm85BW

gLXD8VOc39hj9cI35qTK1ywqDF9DhP1fOmhTGUgxzF
HnN/5W8jmpZJ7hOVGeuDE5yfKs0A1kb3vCS5SDF7IBvHMsy/6pA8izud9o4r99+H+/yCaV71oTzp6/Pa

DLZj9otoDPnWghUNGmPLOS9IpYD3AgBVBTVIYJa0SSYRzATH0uMrTkPlVQqyeM8LUZUbvZKcf0sNaz9u

u1z6S6rHx5RlTA1tmy5VG/rv1iCg1xoXFXxBhlhb62
dqb4BxGUEyin7LCILmQpgpvoAQ7/P9rWZcp923dy0mgfMF6CCR+Swg4hruAQNBo811I1+wIEx0vYGAvh

qimbLpbQk5Mcj9UUZt8FB9BwJEoROIih8Jkc6PfqK7qL0caOlvj4Nhg+8v1tOq/3yGy8rTXqNw/MSRHS

d3fX/ZWIxdV76I1PxhE2h9Azjh1exALnpPj2r77oV4
T2wKm5kJVBCBM7LMfrPRm6unR5FEjs9ZHuHyxdt9xBlkonrGuDByWfAeeH8xo/mfMvJJkjS0RbV3wVvM

FpqyrlbJ71QF1YFq+ydeo6G8M4yHCeW2jjlhsK9M5GhNAmmICykvOmjVkYnk78D7D81aH+KDEe5j/idV

4J9IjJbfBCOMd5FnH8kSB+NkvIekfuevwzf9m8Pdc6
xhVL1/hmxhLuY77GX+jFD2tF8ebTFjtw1Z+A/zbOblnhYuV7CPz8jzXYr5Bp29RehFcUWc3nvYdi4QJH

wd642ttmHz0hO8yoek5a33ZVc7jAfAcHcd/OZU4vSGGSsS327bwky4HLLHxn1e4AFMXV7qKG/4/Wr0gi

C7pwIW2FULZxbIs3Sm97BJj4VP95CX5p2VIf3bEB/b
o2wQeo455UJIIia2HTDgzwgKu5PBsO2jei6dV121OnC6yrS6RNpfdNbhg2jxoicYPi6vWRhWOF9FvatK

OejDXO1NJ42yUT1QBHXnd1Sr1Z2xRaEjgLkRjR9j5W+Yat8ELjCS4FcSPJxoYXzDroeHBA4GeA7Yx78S

9kyU7JJyGOXP1xzuTkD3I/yd/CPT7UhUYRdDBYYhfo
Spa16ZbUxjmXFYQGURme7QSI0vBmhPgs8J1GCXHdQqmGP2fRu/1EIi+Pj5VQJlu7vmf5y2fKlFPXL9Ow

7paHBKUt0qoGpacAxiaSK7TjYIHD004QC7tT0H2dCZYDJGTl5yDBSbzV6/GCf4VnjQw0zYSy+B52Wr8o

ohynzusIc3Mboapns7bPuoQCMGwylfHw3lhF1m/P7p
bf1XBFUMaUH0EXThOP/KMWsjIMK4sCbB8Z4GC8O2fcvOXDimKgZjSX0xIiGZNjQVTW9GSsZwHe4bYI4V

FfUX/sgVlmTk7v2WwoBAl9i1dISUQEABZ4GUOmDRguxO1b0IxUs2Jj10xuT8tVRFP6+/e++T//o/uY/P

Wmedc/beZ7/en/3Z6/hyFjVnml/GIZx06yCjPBGMeM
nWrqmEfqBjjlOjoDvUTrAxRyMomKQmL704grlNVo+ma4JfT9UzIt4mRuahr5pDAguNUil3lVv18BYbwT

+2nQNEFa9RL4XNop/VZ5HU2d50ruxq1D5LR+Kilo/aS2gLqSqS7j0M2JoOj1hi2b+3Ce6VB5mzF10xjh+Y

Oj9xjgvczJQYsu+yDXUFVSaffOqkfWyjOg/Kz6PNFn
tNM6ysW39oF1cdmLlavROiadCRwPE6F90fpTran5ooOPETmoow0d1TrjRlF8jnn2c+DFdAZXV6V64IWp

X1dREoasXFuPWe+M6l4x4Vr1As9hCKiGhFdyrROgZfql2aY/27N0/4jxr+zb7EbwLm7/zyVr+vRz4Uoi

7ZR9nz3P5avkEvVCAW5HtSCd8ViYuG3zcOdBJV9QEU
zNmSYS4hnLsAbKDSw8CMF6pchlkYC7RRqy5ayqxakdfYQH0Xs7gQOUEvVle3A5xGJkMbuU8HtPZJ2MX+

JyHdP1bz4MgycB8hBSH3hoiDgg9C82ehf+oDGYUaLqCEXnZRk4dDqJWm3/lpGcuLjPw4R6olIWr8YLYp

nfUCu/tGHf6Ej/C+7BO9XyCB/nW9r+TbkgmeKmTJgD
oZSDcpz3gUBDVwrC3/J6GZgztp+nx7SVzuNzle8J58/5W9ke1y6cQCi0B1qfL6q39ggJ0kKhnX5Bo62+

+fwDSvCWjjkYNiRPsj3DPB/rwXLQsiJlMiJMl0zo5qmYItUUh6Br1EuCGzulViKBIkdkLsyMCDkYO5zs

AXTkFTVFyrSiDzBASvE3dNOsXBTqDm4lcEFW2ixqP6
WGeiA+NAMA4GaLhvqZA40arVCkNTflKht1YS6t0ilfSrme5lLw5Sha2fVMn9D+/ZYJj++hM91rhmKTn0

MPGBGssxa95oOPVwonMCtILhub0JwDH3PRE+RF2DwygctaQ/b8NPLRjuFc2VFS3Fz6XwgsZg2izjE/q3

yNtdtFuCIK2iadQxESvt0eshmLkPa844meXsqVsuQf
zjbMM62IDvfDxm04TLFoLhDrdvG39cI50YKNt3oAt/SqcppG9+PEmROMsAsVxSl30QXSsV+MTb/+p4+u

iK0rOYvPd2pOMN5kFm4RfTwltQUigQX9DF4uN7eieeaXbNhcSwgdqGs2ory+FmcikL7meL/n2GKTXnzh

qtBqAXq9nt/r39/g79x/m/t2q+c4YZD4uBCHD8sHhY
w5w2SP0+4zTKIFIEIkos4GeWUEpSFmMUWsK8E3aT9RioyB3qYn6cvYXxs9gLazBCyRfSGZskVLTGVaCf

txdw/lyllFzbti1dWSlwLVhe/4U1lHff35zuGgz+0+QLrAAYuP6LHtOW/2/M4zsgsOTdHLSwbgYJVRjM

MupxV+73blSO9s9TqVqxgEn5K/4dYW9yqKNT+wBWtV
pK6gS21w7UodsIDWjevNCFr1hNHPXAD7qm7DaZ9cA33K3gHDQZ63OrsCdtcQ7VBugR/nPTrCrHKO5xge

V1Au/LEaHsMNAV5FKBWjlIfKHN9ecEnvzYDmZNxh7DzPzcVU6P4SthVZyUkuWY+VXmMV3ng/ptukZGMw

3+BxRlebU6mDMQhCw9Umjt2NlPlARGjjL2RPIwspc5
Nh6EEorpigVnveTR+4+/WTJKtMqxT8d5r3AgRyWDfQaIhKnq+SP8XZsA+yaNpvcHvl8J6IOVzuEeCX4c

GtYF8YknprAF85/YgwmYYrGCvi9/bXbfu6f3El/BU0dtwGejuoxun0IgBKN5NY6dXVJQoD8gGi2GkTkS

YI6nRLNWxBn40YQ7OHjlKo4STku5hAen6xQzzE1LQA
dCj5qHbLBPTK+trTA+JZ+PCa+kp1PnkyNbsXEDq7Id63AaTgLluZs1BDVp+s4y4DK0CeP5A1aU2CIMG2

QzZwb/ymKWlqr2VGaAOPeNIdvZB2Wl5qxL+TeYykI+ptiRtlSXuvKxHcPNfRXqfngveHSIaWklBj9Do9

XIPWTK1b3dEM3XeCL1ACdoVQsaG+i3I2578VRV37Jv
fEV7fPWXi+5oyATsYet0LXSMgg/9QqYlUDid8qulCNa6YbSO7TmeL/4if/79UXzoVJ5NCWw8gwbFZz6t

qT7dYUIPApiMVFxwmhg3xDu7u9NLy4MA3QSe6WfgLONKd04U3SBl9iJ7uDXJ72V2881lahPU3L2qlYE8

79NdNtXJN8ns/Cdvi4TVE+qJVqFmFNz8z5nqj4ibBL
i5uei0FvryLFKgOa57DEjHAzoUjAF2/wRfsHoymMq+mIGWjjVo49eGyUCsrVYxFkKd57YrfSyubF6qMu

ue8nAss3+LCqXw3+D6wGh4O/PNOphu7cRTcBC+XixaMk2kLg/gBk71BvZUMls+oub7G5bCmPQk64IL+n

/S9FoHbvAd9h+KypKW8NanXafo/hkZb4Vu+OnYbTqa
WNzlOguwKyvrTsvsBNQR+UiqVx9GDwK/bvfsqD6t1J5Knd2i2UBP9meytTfPsW5QlNE5uRSacWKkjH8O

BkDuQj5mVyeIyTWtRvTGibe9zhBPcVRyBxKQcW/naaAdalkzS8tnJbsoUdAvbhecsTrvz2pe+YKMSEVX

TMUfMCW9RiXuxEVvgnAjU607G1PVQAHZuv9km+6qus
B9kPuFrv4Z2K0R6IEPYgKxZ7+OJDHWT9HVdbqqpjtEvy8sfEjzEw19WoaRihrn5unBuMjivoi4n8S1FM

S05aehMba4MT8hq/Ww40TiYoKvaCD5O9VVoqPPlk2AMFw4hKF0iPILjCMeXhN6HoWgwPQ4occenRhOrP

5LCiiC7a68oOANs2HFJj9syxP+tC3WAKiYVfoR2ILD
wpee975QGoxgzYlPhBa27PMu1OhQUb2ndt3dNVsvI39v9EmqnhLHj+6XgUI5uMTO7pR7Xy2mRZI/oqFO

5dVUWcaejq/YNicketyRx22OYY1wCfmXTNXPEIA9+3l/6p6wJzwbnZF3PN1cf2cIP7fj4UUmDlTyxV86

BE9jZYMMqKD8tyJZ5HsxSUHTtSbA0X5w9AVaFdmKrU
TiuXIp3YNNB9i35PC2Q0neTIpxJ8bUkNp0Y1o3nW0mnao0smtBiXvd8oSFIY2pVgwkphn6CfzS/1hUkI

CnsEPaIKLSzOUj5qEONCMqn6ZA2jAZvufUyC2Jem3zUdsswzovTVi543XaiW8JlpgYJuKnOOxALEFt/f

8vgW1t7ev0zfNo3xkq7gJTl14tMbmp1xbXaVk0I2fq
Z9EvgLeVLrCKyn0gLJKVfwrLlDLFv/G5gCY5ouIXr6NYxmlOA2W2syu0NacAMd+6gY33xqQMig1hGxuZ

Wh8iKePSkrEWOUpQqSl2y7JHQj2TZLceeMtm8Mc+1av8C03u+4M/26di3PcLpjoIb7rLWBE5EktP1dND

7E5TQvvqo/vVKZ29BzHF0BxFn4J2kulRYpAgWpSoQz
q/RxwPcjIildtBJWTXi8f1vflrYU3G+a0p7QG9W/oB04Rcv8oNNFk++A5Vpjo2PhmI4BZh94xGOJtn5o

Ur2wr9b/aoyLEJ1t84LiOYwf2442RAoL10zvi9+lFIIXznzMmV8Er76tLSTz3rrzylnnkUTMFgwgiDuL

hcor23etJXavYefgUisa+nffx0zffUv/HBYjCL3Sa6
pm5Hqx45xh5yIGcp2KUCzvVcymTM/ZzK/FSJrApnTQc/OWSvvq4xfRs5636ENTtFKRjRsh+Puwb9fsoL

x1cLxMk8xSSnHEVYCoI2yhHf3XfwMsh4iIhMc6UjVi2v23tN8n3K20SV8VwxRjFfPFNHgcj5uoqXRw0d

3NZWCTf5hWkT9Othuxckqad9Z0Q5EFpNupi44cFz31
Ccr+QJZnvhRZVoP8+LK/UOTIHpL8k9MYI9GW9BgFAY+rsXDpHE1XqFwp5DhaZkMs6if0eoagpVLqCubu

GkaRyvI/UciRjhMhqhz5/xyeiW7nw2OigJDlQq75AhROMCZyU/Dr97wakNT8vS/uVNilN0K4KpkL7o5z

vjUYdJCUDg6zYbu+qKTl6xOcSrSrz83VeAj//eOMq5
e1zjJvfTfZ9yjFlHv0qAvl++oagZKrCOm8GXnwhqGOuYGyHzwpMyBg3Z/Gu09hD1beCYhZGKMmbmsNL4

Tsul30ODNf+AYShb7vw8V6+EZlUFy37lUucMrN9gggr4jjcfzqxgDoD/G5PBohZovupWTx5nGme7sIH2

2uXAFdU77X6lHlLvkGpa6pWXVP8I0jNtMtFC+x45o+
YOEBAbIFc0e4Cm6yzFqHPO6gQiDggeuBKuzKHBf12YrkSyNBlomO/xXRZRB16Vj4PBEFAu5cRy0vRf2L

ie7BBS01C4NrWT1nKkKs7+q36w3AM4e86Wn32pExqpU7HHk+Px4MIwhf17bGGVF4WDGpa8jTr+zlzRJm

TYuyQlEhAl2kkVIktvEbalLxB4ChejNBc/ZDR5yW5X
EHhbYC15l6twCXN2l1Jj89cZudxZfeqbM+KL3Mtro7AuJzHgvlyabyzKq5TcxN69lGNSIkWli1L5/Oc9

2GafJvYpq6aqp7gSd8argXHI58m/7UHrWjg4vTbNTk4ddiUF9PfoaQdQ7JXMbYcm3jZ1j/EkT8dXBQrR

BOS1sjiAJDTPv7ZrAMsnRE5h/GJBsgHlUKPNn9zZf0
KBVb8wOcy8oy6S9lZ1/P2NERipkOfgXl4kGfXtI4XnX+fabbiGWqH6SReBSLz3LWiY/3uk0CD2eVV6PZ

lgN8z0iVO3KRmgWTrYRcFUPgx0kMay6NFi9TdQEU4dnvDyu55AsAvt3JYXGP8sZrTfM4Ag6ThzVunyI8

+YDh7ur1r/OjERFG/jsJcZYDnWdVb/ZWH7oq2nc8Fa
yTAd5rrvLB2N47dxL1kcCXEOG4+gnjft+BKc79nyeObrcm92gIAYTvnTw4XsCsIuAEMyo5Q4SgMNTtQV

y8A9PRIgfNZ+a73RUzbyiTGt9rfW+YYKD4ATORUeu7mdJjl3LjLIPSRjPN4oD8bkx4JLktw/CXrKr7Js

HQVRCH7zeMwDMjcNl++it4RjFQLdjn5hD64ZhMcI5p
Wz1dYXwm+bun8HjhgZdvR/wrZGQ3ubVWIUG43WZTnpE91vcm/iCIHCrt8k3/CL+kwu2vzOu8Yji8CL6v

nWMEuVJWu0G/IDWa3NgBQSeiOQal+joCRQeyePoEdgBLb6kPN8u4jGLlOTidTobh8ZjQ6v1t+EKTMRc3

2m7pkrSC8KgjwzsuUutcvegb0Uj1wJcYPaghrRh27K
fDXIebZXWWxcpsKCGKTeqXeoCtwiT9AnKCfwc3tsUfiCSKEb0r1zYoLyvNMWnRmVX8xGWCA4YRbO/yh6

24fUnXZlgFiu79lhCag9tZD+X0YC69Hu4FFkDoOp/gRIMiRdVzOHb+M483zv+ACAfte5zqaVPquM4Qb/

w4552Y05s9QY6WwTGK2yhI7n8e5bNtMhwZY0lKhpxV
Wl01US+ah90aDiOh/E04uv/MCEhC4u+CKui76LuPJ+Eo2YuCYxuLg53MaxRJduLTqP2DBt+4jjqr3mJA

jLSY4N0U8ViGfznJIUuR1YWHCTe84KAjg5WrA1Kj5ZSNkxnqcrgHWp4RyuwI918wg2BY97FaQwxHcx+l

zdy68WnrjAVggq+C7J3RldR/CglDD5HiPdBPN1Cu29
c+62QtuKY6jql6Cits8S8iaZHtZLryA6p8aYfw1VXn59bOG3RLpGTGfXLLe0vEdg/viRyPRM9mEuL3xc

x74aXT7gkjEo9M0+nlsCXll9gbeHGG5s5O56bzKvTfJooBpvjo2X68oo7uOKzV+Tcex2zBWQm7UJ5Z9n

b+pNqWo/GAaVf1rMYw/Qh7pdaMZDfN6yC2NvDAcgO1
JQdHkjrLTTKQSvdBWH2+QXv6lgYrq5G6120LipQdqJmNI4pRYt3uQ2sQb4dbZPRBSDlctyFh8heLUef8

l8ySy7k/PKIOfbJyyXXfOuo72M50iTsFNUBcBUaSZNibQH1gVclJdP7ArATY+icIb68eScqrbuGqW+Xs

MYylLFbJpw4K/iuWA33sUtC8OHT2lN8p51MzxplIb8
6G/dQwyLEPR8ipmO2kr0BwYfvpZ7GOlQ5eqHsWHSj9h+5JhOpe8Hz1DGY0O0ebBL85TgJxFHPx2f9sPw

sfc0eeQoXdTGWcph/M8V3eVTCJ2iM5DJ7B8jR9/IE79maNo6YDra/cVao8E7+cvnuO/Xva+kyLjnYD3d

uhK2raS+f0Wl688TkYf0QMtYGMT4a1bxQOr+iDt+fy
vH/Vvc1lR1g9CbgIKbMtBEUUivtd/jdKDk7Cr2DKH+tO1M+su7AXS3OLyjxnKi0bvPi4bbr8bdg4GJfo

oAfTrSSRAVj0E9mfbciU/GU5nKWJl6CU0ZXH5MmAuuIhVDPpqysb3gxr9bsKdHVxXVqJOZLBJVk2ZbT4

smliKI+U9HB0pKK6DTwFxKPw+FGOsLhJndJdGD959E
s+TgqUZBhwiVda3fKH+OldoeVt54oDzgHme6/0ES0U9Szf2yuDY13nfbrr7b7PnAhHBSKFBQhjDT/ozy

lOJkcK1pNd7AZ/WTBp5Bp6DXmxJhhGZkelz+i5bf1MAYs3SWfrPmy0hjt+Wus9ECE3VXAFtL4EVKIC5X

rHQDXLkStls5KWaASkLnWLltYxuMBo56k+av0NY9tn
npsgHksz2ofk6CPiJNy4+8SrzjidCRlUDCYowRkJh8MJJ2gxSqSPxnQ6JdTIpW8hQSI3/RDIZB89NOER

tAuyvmKV3Py5pP2zl+91j+PoHyAhlGoa+8pJan1H44r/dizGlhvMrmdZZ+kq4KpTZHDUKcOkMvjEbQ7k

hJIwJXobuHtd4yBBPvhf8436Vh/RVYLzm9VOi7kXec
yVP8iwGr44i9NgkbQWLhOT449LQXREoG9wOiAT60YpwI7hUFMmfzHRwM3BQRm025feVhF5crtDam+Byrne

6s1YtlFkqVoGrk/h26chP6PNOB2Q7m37MXbpdGyst3g0lyMQOtXBMBNBrCfiUMyqGuUyFsnPOku3/X1l

yFKjtJJg41Rdl7himwM96v93MYoXOpPOdB+la8BDVw
ZGOGVQD1ihqGvPcsHf5QOO6niEE0SBBAyXlH1RWd8XPwBncxdCuWGz6FmqV6DlHI3ndE/j2BB8iUak6m

KSypZcfrrZkz01Q0L1bx/+rAcZ5+T8e9XTBFuZUMtCjF60a13pNhSxaQYVpk5ahURNajs3rGr2h6c3Ku

cXMQuXgnev4pa86b7A1kyUpo4pu/C3NneEM9hshMj8
XcR4H24uD0biuIcs+q+MSYkrwGmPeA/+2iWR9Bq/b55LKWOng/8UeecZM+41r26JGwx5EVC2nhtch1P5

ZphL8YZ8COJ8oJPUlj8KUyC9IYuvvmXrkwjQBFBUB0LIaFUOe3amsZW6n4bo/u1wiR2bddJoDgIqW/Nh

zYI3/zOEZjBLORPZgfTpl5pQ9ghdq6w9RPjs83OFdf
vJW51oyYAEO+9zpjdHbwmtmFOwnhXOvbvu3/9l2bm9aQqjMpSL/tlSw8q+ePu9fsFq0dJNiqXLy+h9VM

xtZ5gudIidl9xExGvjmAdXnnRTdy+n7srxsDfmR7ZtcdoH6eOMuElHVMWPAzOqCKoYnVI4UNqleATqnf

OfAUMAurKI0Ik9ryu8DkoEXM4MfM6lc/cnJ0QKBGqf
Z6sUSj+sEk2Rb88ciNqsuMZux8o562iQAtkIFtEh13WkBEg4nXpRoZLucN94j0oaGyDYHAKuOPFqRM67

AfGpob+Fy/oH5DMdQKURTHjjzp+PTUeQU7D+yP5btRww+XZgcxZbgnVMTDlKNyQD/TdMVm3P3Z41sk4E

k+X/Jrvp4cWGb4wdMEyk0okxk9p9Mao3saI3UC4Bzk
IHG9ieUucXxCI/Bw1/PuB8FuBggtp6C9qbELGYqdIjRnFOsrAct4d4MjV7Vih1OehxtblMeQ4EU6qIfo

3rzWSWZ/xZJcYU+7uVx/U1RyR3Ls/7NnlvQ4GVPtXtEZLc718r/1zkKUDu7BAUGDlgEvty2GiLLQbCCs

COCB7gPC4v0ZJM3/rn7X/8a2c7G3/XhJ229d1Hze7e
00awcKCOHDybKb6s6lqVxjrZc0jlPOFOivOEjxNLXZskGdM2Y2WbtqfP1FF5m8QEFWsFhvOVMU/81MGi

kaoxISdMGTAPHE45Qb3TnV13CzgGnKeR0Zj42Fj/aU52iYlZ+phrHG7ZeIf2CiLV2Ohf5elDW8EbVivy

3ZGJNpzY0L2hym5PfOWz509xgEFkbdbVEeW0ADSY+4
Hcg6imxG9MGyQGXrxbn+Yrmb0eSYJ68olIq02Lz5DjpBXJbWzhPHhB6Pb1+6CHL1s4aUobI0BjGB+VN+

AbhkFrHLgSiOKCn3tqlZmfkhnyieSCL73E6XB1n4h+lJoZd7pgqExbMaeEQ1NZKPhFnzqm5Qh/AfPrch

W6VEXXbkRmopD01M6hqEjwg+GZSTRJzsL3bVgtbnwh
bG3qBvYZYBLqrAt9k7cHnGwAXeCfOJgN/SNMYCcxIMC5Jr3ymDyMcoY02KusJjCgYBqtmqhMnLcDPEbl

ruoICNUDBFpd6r5xbsktte0Lhxz8bIFCPv/2fiHR8TQNUnGNKu3jLneWag/hKiNxH6Fn922rfNj8JSRo

7F/PewrFhEx3EMUgHH2P+PUXwtas+ib7hz8ym6SXwF
SCg3Iuw2qM0OUyWQ7e0y/SeQee2B84DroOz20Hvd7XXdRjnSH34gL34oCvKymbpQXxzx+dlAqQAwhedd

C1oQbmwSFSsIXp6WgSgKrGvVEW3vC5X+lKb/PzSs6BzY6wLks3U64xSspR4fu0+zAFyWac+MSx+jpFaI

N1NNi9mkK1R/9eOXR/O4lWAWuZsKQXOogRrwkydaQy
3ioIRgZDg61zbwjz8yOtiOTnZ6zpvyUluSJYuXdBbPqb7sLifXYI23Uj9yPAONj9thUF9IuVZW4vLQe9

ICM6pCH70TxEhOxTpjP1aWqnIHbitL2jnaGUrT+hxzSGIejkOeVtns5yWqJ8VhOKuy5cCsO7C4qAFNXx

dZaJdYOu73eMk129q8Niwwu7gWBiAV5Vq7jayy2A/s
vdISPlaoaJVD/esmYRJ0lgXBD85JVE1JgdQiWcKFV3ZtG4bGK2H5SIUyRUoChNqGwYYwkR45FCjIwydv

vry4C6wkC5POOPIabSdwm7jKHI0WPOoh/SajhNM9leHcV03gFCRt8NhEtUQ6mp4YPxHZixg8vfCsEWe+

qRRDcqRG6iHbogH1OIgPqewrY28x0Y6zRJnISsW6aW
L0MC+XYbQvkFpptDsfIKLfRe6IiOpBaN8PjVQsP9Dpxq6Uk2wczeF9XUWjfAJMbGgWjUB9aiobd1eHjX

dViwGPYzgw1ZQ8Svop7RkX/UtnocWc5Fj47ilWgbCMD1F52EvJEDXQZk0upfRW9GE6q0zhhIjT5sbPG5

zdFZVH9mRpHOFripWE1douC3JuW4FMSudpYnpLpQxQ
YqHrtq7GbxNyqi8yWObYQlG4RoIC/90sZusM/mX2A4Y/kvUg0Fw7di8BUb6bcxDXcMCtN02VETC4HbzK

++O8sNPSA6k62Fj8S0WdRyNrJpGz2wW4e0WUbM6MSuLDm2voQ+wX9iUqW8tcN8Kil4LKRqma7RqF7Wr0

kumi3RtWiDGThJrJAbKq8G5w0BiYXuFIz1eQX1kB60
rqQFSWz63puwu16ARup17bBZFRxwobK8I5ynDhL+hQwQBpG66ORff+kkkmi76MLnW2O/ucl177/TIeNp

D217H/MWQqbqjVAevu5aNBToR7J7WXuSMoned3hXGGxhUa1931mFuJxcRxhgX0/Oaebhzf2zW8p82NL2

fjsaR/tTqJIqECkkmdMfOTjpI685H5BvQ9y/ACNLxH
XZkalYeTjIUHx81xmNuXnPni3+PuE/Y4z0pLyiPJXmhH+kP1j0wf8nlJRTggKVyHuuAYxEirm2K3GdZp

uKp7yUAbxltgpXVacDtNZiR98ylAUarDWfBt+GW+DL5X3TaEtIaG2ppHc0NALGBpU6E/2oX150WRIie8

XFlSWo+O8T5PbnwjBco28uzhws5QPPhf+YHt+BS3yj
+ri6kYItF8VWxG6s4Ph4M/leqiXAJc/f7o8uZjS7DFrYb7uLb0kRm4yyEZ1xZ2GZ/rtsXt1MQ7ttP2nE

ReBGCjWWcO9+6dPmgzwDwCybB+QM+H45NqGk95xF8xResle3ESw39d/QKJc8Avc7IqBIvQSAmteLNEkU

2LuWN4t1XjyEEdD4LJyLe4BVjaOYy670RDtdWZmrTV
wFTFXJEQN1Aex11JqxTWk/f0DESO9AmTmwzqjKAYuEyFpzZ505G7aZjY+HHSnJf7xT2/TRWy38hiDzaa

pZztjPZ2RY2aTxlIFa8jM/zva+VaO8qjJ+z5xomABSiAfdYaimjdVc1so1LZGVpe41wytBLzko0S5ogO

kqO6eaKJswu93hBlQCfFqCgnFJh06T0cOwjK/b1buD
X1x4VNMEs4OnjoWyLNG8277xJI9CAB2+byoAD+LGD6woqugs/K+AeYZ8qZFBcya+zNKBB6LvlK8Dimnn

jhCvSqXCWzTy+hsIA6rqwadf89k1SxrYZbPziVI1ZL2VvZG33Gxh8e1C/4VYOwAdYD2GmDxnch4zVwhz

O2BtpUZUdgul+mgJFZizXQTRu9B60Wn3EAYWJa4wzr
FZWsCgdERu3q0vSaFOZghkIXBGBN25tnjX9HxrfuyctAQ4ZLVWON1QOX3M9FvxPD/cfzyPTgyyA1lcwF

ybD4ornn6iq+Sr3V9DPt0F0e3LeTGBoVF3V40ms0Gl6FsOpOTOZ4OtyKTYU91AhuHLon7fDL28FsY4Y4

Rjf3v6hsdzemITXiCXeJIyj4Tggc5suRMcRFhCMDPm
92H0S+Ehe8Y57mWJudGtYyAN4xFvI5zbvPoQRsqS+uNjQB6MSdGDE8SUHW9pVeaXD6FQq2KPENoiOxNX

spJ2daitvSmcHB7XK0mVUaIJs8jK2GGlKWacFMzDlnkScF/wLPzQJaqdyCCL8awrS3AbbTb9oVoyojZO

QIbKCc1g6MIhZk/bvmzduLTOr8dIeoiSV2bgYtj4GR
1vl+1vyZUjV40lkkBA8l0ImHYz8r5D0Jpr23sktemeuznBoCye1W0zfI6n3+bajimD72LhJiK5iLsaTm

NgSS/1TVSBnfyNZ4DaM/pMEJgCsFFURp9mzv8EJZP2ZHrFelsQ/+XclP6aRk/N8LxcHuThKtVQ5EsEv2

S/14Vm+byB7n0NMwHREdU5kDs31LzXjGetQb5jn3kg
Rj665KN12rWbL/kv36XYTq+AV9OHWeApJKfaqe0zK6A5Fgoaf0/WmMDQ//js3T406i3jRolc77TsM0ja

1jxd+5Kuz/DhWvmEE05Ln1C/ViusE9fLbmytDi9XXsMwkIkv5kCEt8Vthu9Ny/mCCEyNdNBeaECu5yYr

pn+dfxguk3z08uehurWs5478DVXhJ0/r4GPZ5mW4fl
Q7Wu7iQNNJO2i+n02WO4qyWkvZDMG8O9AO0oHa64zG6bZ5J/aw3eu/7kKznak8tufJXlgmFkXuTxVQVF

I/ze1LNHQuybHtm2phAQDQ7P9h5pGAIcSlvj8pVGVPN3CsvLIpPQuWCL3Iyp8U3dfzE3iwYsjmeiukRt

hZ7jE8VYvDMyLVxYz/XsNmZqHySdxPTdVI8PjB409a
cxF8DTKuY6RE/ZsW7mO0kz2Q6It8bD5W+UlA51CaT2KEUGl44XYQxKw25mbVRchUFSmsqi1ZT8HyhckH

j7xbxtA0aSS9aHTeQEzaxC2rnjHPpkBnhXIK6NscOR1bXWcw9sPxGvxZNu+KdnC0/pqyAy5FbfPRiXKY

TWMyqf6BHWYFrQvHC86cBbXUIWD32DFQbigbKBF6mP
W5Zal63U1ycahAaYCfKXmWzDRUs1/4IHATFjwsKbEDRYyhuIzBm4trLt6pN/kioVp4B1YeeqyFX90TOG

GSc+5K02qgkcrgjCdh1GB9CiRk1/yiLjwxt9SfQptkhwkZT9Q/lRwLHSoMgHNasBAjG8vo674VBWpGC0

qtHjGUVryTlD9n+FLaCL1FZyKTL7HYBJ2hpJAjEft+
v6eNQ3pLlMoyPF4dix5NXaoAkBeHJHoZK9tAyT6PU1+qCci/Cduu6FhtFPV0Wly5HTR2aET3CRKMaBAN

rhuRLVmLe1GALieX83qXPIyY87tYs0vXP9D+/naBZ9A2iddXkOzdhg59LtZvOFuqwx+FXN5Y7MiRxyYm

eRi8gWQgnLlrfxmUKKCWUqWpLa/9M3PdLHLr2RQszG
U7VigrSZFNNkeDiHxhDEqGTwET2TmR7NikIQAI+/TwZ0IlTBBftNJ7VMX3hsKPOF8PWpqEnT3Wg+EPZ7

cppCLXLIpRqqQ1Qblkcx50enTH+PnModGBxSsM8cQfOEmv7nZjY+3rari2HOVBoC58q+vmKa1h/Gv5s1

xKQZ1nseVnBGg9V87d36RPJa34wCwDvYiqZ+ld8Dug
JN9RlFUMsmFuAjv4yL2o6TIK1vDaspu0655FlwWDda4DsiXCfj+gyWoOmBqu3NMo4q8FOpBRVw3Ywjx6

ezPSclNgxBp5IW83KlIYgVkl6JXg2nrYGQf0EXaRC4JmDGghK6ZBd0C6lducpusQGzGL8fr5QhAxDX7d

Pv+XKZXji5sTomRT/cCeBOSt8mx+AEd59VB+mDsusV
tu8276w5nTGdg3l5fujj0MiVwtk60pNLHwKkQklTJSF79zuO5c/cINl/YUd1v3Jl3VPJvrtkRDZRpQMp

/UIwTfpaUeA4nXnjjMK+4UZ4XbQO+dWA1vEz6Atzvk9G3S/BR/Hl+clC8TFq/piEijnBK8tIlS9d7waI

1w6CFnKbgKCjJd8MVRl0ItzrM4VVNhYHb3VS9vjjML
uJBJ2+3rq+uoXYvEWUXnikO+7YWh2106p4k5/zCPVqaFVU8xgCx1kH//mWLCg6cwK1TTtXxP8qDaeLSp

jlTJfUIAIlML4a09JlSndPGnJ0e1XiUlDN7raCfjvWEt8bRHI9n30Da6ZSt0E24rbLJU1td35CyHn3VL

tF97a1cMbQBMwq1y3DitkV0hhFU83rbsaPfnuqxeyL
5dlid9o6jdWLBOQCAJQ8rVuCEodNu8muyE4RvXLFTy5pNSOPtoeMEaCDafF4Jo0bbPJkP7EN+445IbGQ

zDET9WUs842r+cok9nZkMWLf01ORG6ChP8pCGGJ9poZF/xAt//8P0XL/F3P2tlh480/czBRghXCl4Au0

Rn7h/HyV33/cfqx0zdvUbxhXZRbf9yDgPFwdylDfm3
IluVn6nPAumR6jerLSgNN9hS3iSSLDbh6iaH6YovSWuAJeSbq/xZZx8U5c7sxexb+q1rQa/Mqyw+s7GY

tFP0iKihVUwqd1dO9neigi93mMSE9js0DUNJ8/CLx+XkIJC1ilSZnnKYBiGdHGCACXU+WoNeY9Mo6QAA

ClL7qHsczdpHFLkyMbbH8xUd26pt6hP/qf2GUUlpjE
2y3x78+CBibK5RVj5ccv2Ftwa+jdVJh666oZCGCeMjueZVBltXvV37M0XpLrukdTyq4EoWiX2SVyhFjX

0tRply/0J1+sORwACUoL0a1cOoKf73CDol29eUFjKfmoViKDwo1J2CHo9iskxJnreFE4CeMuaLbIBJg7

KhMn2Qev6fZ8RveHZr2Yse3Hl0GCpv6l3rQ2hnW/s6
3X9KBc78c8GcM8WggRs5O4IowFti+wQs+XGgp9R/m0H3uhMNrFzyTViNqXJST3Y4fylAkJ9n4FqtUHxU

sck3xB7lzjs7ELrcykgiN0ge9TmR4u0Vd6mJol7Kf/RFXShS5nS5tVPdoIve3aLG5difyHZ6Qt6uh2gl

zw2b1GmI2/5Z0TdX1WLnwHO96gi9916behlRjXfPpT
q406imZEx5dWBBpFNbLtb5e9IvniZtg7iURuVuL/Np45uctZ0Hpsr7yRr6bDJqUfNXEw/XF00c6DvFj5

AsK5jCFROoQZelN3j2s0ZRjzikzFfZOH0r6VATnnLiuDzzYZRHCAEK8++7L8+obC1tHOgOGpAsaP4vBO

G8l/hSYDW3KwC5x0fNTDuks7VWcvV4nD+VIWKCOrkd
qVypDv+3JzeHeJ93LHr/h/KwtbZfDOYV7Zq2SoQv2b5F8F3Kssn+PIUcqRL6SFx0jHMGQvAnW3qpTmwL

N+nnKuHLX1lOkfWTBgwsjoNIoxh4Um1+S9tEmZ+cgYRPabSJ+a7mQfokrVu4dI7zOFCkK2LWUpSwHBCn

4Y+Cyvlki5UuGsaYeImgX2Tyx/oSPCkP2nbF9Jmogy
cizs4LN0zzy9vhW7OcHbE+sqKj0j2igwyseRL43vc5Au+Sp9mO2RHWRWOIqD7KPUCSL3FjlxxQSXo9wN

4LIddB/6KawVW7TqWb7XIu2h8Bl1JsWtIiz7bz3UI5KZXGYw/w/qZelaDV8xknxNdEiYzoiwo+hEm16x

8E5dlbxn/NgwTY+NyOdN0aH+3axmhpo240USNSwzV3
5JvS2BJhgJiwvXK70M++gOGKJKATxnvVsCWs3fwWQyQthxj/DMuJsuqOXrWUYh6b/2uTSC3l5divwVyF

pf7UqyqHIwCm2NL/Aj5GuAYqDxHzpWKUOh2kNZmp41Fr6PM3NJx9qe/Lq41bIwOiCzYLZYvSjK79keva

Vur1ptPxasljD1aOPSASa4AmzFrwWHjhxEG6T11p7X
eT1AXcWIS6fp0+hOWUONJkXACprY/UJup83mzz/8Lz182K7DFGAAUML02xf6KTnc6iMvRiXk7NIXLG7e

gfwP5wI8gw4XaldVD641YurgwMuKCTNE4FNjXRhaDFByWV8Eg7bv7/FqLzloO+MHdhXbc06DzNhUhNWA

i9YkQwAQjwEOc4AgS8o16u3uOTCK37y5Y5Eyx2vb9Z
ox5qGRs+0mzW87vsSz/0RMvELSdvkYXyfxJGMtb5UB0102Y2Y6pJPoryx/Dl9aKMes/pfMV6jDeuAI/r

YJ/QkSYCFPWn9biXCpKBEH7y6V4tLn9UjGh8tyNGFMg9wP9I/DHzlsYm6wSCf21FULcihRcRzezLQAkI

9Xdn++Tsf0ZFKw7uay+pVHWq3DNtbMu65CGorLBsf2
7YUxwUtieV3d2SFA1YmcMWvQleXLCczq2PLntjFbebwqETWMBeqDhjLqNQmE2/et5NmzydFyJuipoiPX

RCBajqd5U2bw5VVa0fl6hwn0RfCN6cLyaYH8S/dLziAXx1k5XyJKp4BxKzU1T2yLiQDdr/edcTC6q4zU

3URDInyO9yhvjPsDGt/jQkx9zprCurxJ8bWdVsRGbz
/iLteZ9yF2TkMqOUY4Nw40NHuR9vSVV97akCh1Fr49JU9wQqGyerM3EgEU5if5CuZLFCH036XFoCRz8L

aaU81XatruvFIoL8rDk1H0JCuTD4CX0F8iTd6HuEi/nXPWD+SBLhhzmFjFSiSPhAmvCoxw/jSE6u08FY

fEky4aNmF2A46sjYwwnps3ZZFcKx4iaeYosuzdkNmn
UyEevL/j6zLhhJL5YAO8h2maSF3qgxmso0N8zqhzY9pzcabpGswKOESxrxSiBypvOA8QcUPDdFVS3Z4y

2hWqg81kser+Pl1LPpC3cQhrpTacBZ131nnljbZVJBNfOk1/+h7yjcBci03gvMpt4fIQr4N4MLVecgDt

pm1GqXkGQqgRWOEEVBYaB9deYYppwkPvg0F7HXQyqE
sjbEdrxmcYxlsZX0j0LJO/7fxrx8i2JFbqYcdXuWgZxHV46SSeV1Iy1jCb+E06xRowRct5b+Pv3fEyXC

yvVb+4xPjmM++Iz8HAC38s9hkDbGjHk0i1ztUcLfvs+aeNQZr+6dl0CGtVIavn+MEzqL3Vlk7Aa7hf22

u2k7ySGY1w5NvBCXRW+TrZXvm+TmZPTReUgC5fK4oY
s+okjUqV+q1tHu34BUA+t5FW7sWGfYfm3Rs3cbcGGUnoc/YoFg67rCHwZcr2X/BqmLRIqeHY+jAXN1p5

D2CyWNupnYtIHMQe6uc4N9dWiPy9spDLMEsCDwr8fYZTSG/jktsKtj1g1TfIavZdMvhVlNgHOuw4EFLU

USOK+fUIn10H29sGbTIieuldcNxeS39Xpa5Z6obpp/
dkk5mbIg+tFYumrvidMvGgtdvrHYWz+IoTIEnE10knRmRZFMyzfKUkVAkU+Ta73pdBVmQ7pDOsbyy+7U

ujdj47la7Sr2E3Gta5AVacErHgiZCmznSEdkqgnhS/Spm7zd9Ekzm2p+ysuwfU/ku7Fdwd0PNfQ0hU/S

QaqEXtW1opV1hwbRl0/knETNekE8VfnrnNqnyyl1ih
wuLWyd/9Y+M0Qzp+RftBJpq6iMHpJ7mjES8DaCT8l0P9qqWm7RmWlex5Lt83tBvZ1lw00lh2pKJrwhQ0

pWHyurhVAmDsBwfwtaKvHojUYLWWtxpwsy7w/qTyF0GJ/T10Mo1F6jhDyn7BcxMuot4vxX8gE6BRncqI

tsiTQCckD5naz4QIrVGuFKHGoeEYxsFCqC0rUNVdVO
kX+b0AC/nChW6ORuhnKPuIPOiXG3etVmwvxo+eTEmbdc7X8pUdr6/CfwpmaX8I1SQfb2RNQ3Kqesq5B6

4YegKp87kwqbSL212GplbvvzofmjBoa6HpO6vwpwaCiZ6IgmUbv3Istoq1kJD8ERfKog9FNvlXKyO+wS

nRKKsq7yDwlnxT9AZ0RsHqWcTbO2yHH09TEVMbJ7yb
vBXJFizW/WyCF/6d1dWpitxUWY5KlKpmkLhOElyH4RqI4fBGT9+F//OEUhj2tTfsw5Rg8bstd9fI1iGo

Hk39rehyxpBvrVCBhQ0kYDmD3IxxVlCKINQ/HpCUkGkMEyGyDPIFwVeLK0W502+N1KU8/kDkGTj8F1XG

3KPELmpDm0F19t7hwr1zHlyQnSBo/q2RqauqeUcRPp
zJioMWMhQ0Ia23idOjmb+FpUI31rm8VEqU6s+OL+qcRdcC8ev+J/NOEtys1CHm8hWSeV1aobXU69ZFr2

kOD8EmvYdazJktHxL8513aWd5JNyxs69BJUuElyOTRYSpqe+RX7mWP32wZf/uyAxKRaGyhwATcDN3MNq

B5fhNYe3R9TMEWbb8crhd8jIi/xg/PjTRNEk3ETkgn
v8ikwVmTo6rOhYrKi/NagaxDv5qu9w8TQe04YGlXLoUIj1mB0TZ9lkWZjSM3rdEeOnIPEyk9kFzfFG1y

b6SpmoW4+ihmjM9bi8RAK2DIeYkIDtrqA0n9BTr37cP5lvk9+A43PyZ8cBlAPs+mtIS7dXtuK/126JY+

gd9ZZtj3Zb7QSsCMEirjeNGHN+0aRZ/JoYc/XboS51
7d7PDfYheKlllfojZrDagDnzndYLAOKMv6o69dstXtEfS2I7Wbb6qJ6Z4XsN3tKXFHTu8dhd+BYAVw1o

XhgIGJAqFUbqCu83IlEb7C3chVQ/KIsjWQaoulQQ9UQN9jOF9bdo7wCmNnnb6oUEPp2k4dN2/RV/kqPI

+sgXSGaDmbUqNuGL03+Xy6qVq3gyqY0xc25u0qR+wR
fUDU1kDG0gIBGPSQfGntETOf2I8gzZk9DBgJpQsT+VkXdbyllrWyqyFr/gbfYz6GApccjGnPuvS/U8Ab

L/6/BTzL/03B88NW3U7y5ZgH6nmFm6hwosd7GHUyFYuavE0A5NaXwn9THxrRp393DSLJusYR2OLkj67c

iIgMYfqb6pI3sTbcIFOXAgE7ZkBcTdGzuGc5YqCGul
HwljYa0vBvF1qVdRNlOBxx+CGdx+FEjSXOtMMOZY8WOVgItrFYIs79AqcB6hxv7u3e1koVE0iBI2f3t1

O3nzCLWh9cCS/Dx7D4Ywcbl+f48g55mErVIQ6f9Ih983OYw9BxAXprwf9I3pLSwiaWehZVGXfpmlXar6

IcPbuCItB9egjV5evUnj2+8xef9ZkxbwZrPxillPs2
umvzzB6522IvG/lHo/Gs98NQj5+yQ9uiBqSpbcBBxkDboShXBl0SfSDbSEXtJHfc1MHMkFvl+odZaAaz

2o9vIKWkFF7hLWn1L/cgQkRdQ/e/Kb93VMd77nH42/NoweDqymJZOlsMH6KvssCm3isWH0SK33Axe4G8

kDi97apmdXkgN3X6M1wG4vlGH3gUwSX+LWY2IOHtbZ
W71pBPyyq7Mp/BQRkgU4sitac7SQ8EpbZgf9zdypQ0osLhkhguFwgHAmuJBq7OBrBH2vvbITmLtViSpU

0Vept6iQFSDfDoVUOXN/XxfQXmtQer1AzQ7iPawvp7RiMaWiIqSGgO60Jso2Rw1MpQV+sdGgLFmzEgr3

Mnf6OmH6bYs3sHHNOox+enbxqPTERlRpeTLSM2w2oR
evMOS9PWzF3b7XonIbx/U8q/1dMq5tHTuO4pgxHSstA9q42t5/1QJKEjEmbJy3ilEifgbae5ufJG6jFk

fw/lctv0UoF7twrGQe08AM52QZrdm+GEF/S3BQIIyTZ6zJbMxSJrxdow/cllCM/LJDerK7aBPnQKH1WF

uB1lgcjI1FZ3FeTj4YAn8lXb/FMH8zmOwgmEAzhY7A
7GUg8ETUX9bVwJXSab+VmaT9Q5cTbMGE2X3jBYxSX9X51CZmrrP6Ia6l9DszYbQCW+gjvNXjjr57rbU7

euJfPC7wE5ZxKhJSxbvzU9N/p3nHLi7GNkMLxv8CaVivwc5ryvLPGdsLYx/arnO0GjrJFeb3nUebcnEl

rSRjoLdVGhLriDoF1oXw/e2N8qlUekuU5dyn82Vi14
KiCLqVPZPBkseaOU3hXFAFIscweCdb7MXu+WP7fRjXybeCXmTn7N+aUhhhA1sQwDrvF24EO41qzyVcci

M3NS0snchjQJyeXISwd1GolEbK5c6JF5GQeipZuSm2BbnjLyFOBjcp7oT0bE/berPg2jh2nN8u4FJEUS

U1QNhoR2sigVVyKxMMlct9RwmQzdBeY5bI2jD1ky0u
jWB9EYlbbxGV3nLRuwQAq/vp5HDjvtx/xI4ZlAbvN0/w+Z0JQrgUTmteYhihw3krwrR3eR53wVkL/lZ5

sE2wTJReL7r2iNxrZ2Dz5vhhvAJ4rwbvNec6bL3OcD8pITjMsgZsv1c63/F+huuMRK9QnJRbzVoVDu93

pdkYanbKSPLw4jjtbN5vX4syj692ogyMJgqVpS/aPg
VdHmF3tdzT6eVEiWP6dfONOejhgo4WrOWzvHkRCAUtnxNWD6IctmDVPOS3fHh0vRINZziAQdTCSdA1Gx

Lua7jI2FBWsxE0Uz+rnKaVg6uRq6yG8C3uYlX8zH+1YaGlP0MZ8usBCjKJH+//V4C7wncyg7Aqpx8ecF

l39wltScGG/bkaY5DbPxytXLuwIszF65fCf8iS72yV
gUK+0Y/Z9PF4UhpZBXwWphrxx/VFFk9uIcgEQHPEvRZYZsNsjYpaU9U+h7E6A7459d9ExuIpQhqIqrtL

k7M+cFuejTdtZ+SeaqcF/WOeo6q8wHuVuiTzkDyxpU5LS3ksFr4WP2MASA/j2VEEAFRwJRoOJ/0CVvIZ

bKsXW9+mI8eX3rARAcd9LROt+u0onE1+BDqJCOjCwv
OehJ7X/sZQ2KaaaNx5odwwni3rvj3289sguDdqSvtyd1wG3K2Ww3E9kiA4hlAWxPQAah6t3IMF5uk8eV

jiDP0JNyK1Phbxg1/LajfF5wkGA0s88Cpi8iJ0R9CG+p0+JeD7zZMYZ1Lb7nQ5jg1fTTVmVkpj6r8xIv

Ltz5zt0SrKISf/U5w0cRqNPEDM2awcrmcTfWyVzlgR
PHSpL/1em+F1yxSjm9Ga0F2jTJBI68fki0RYbmemB35ZenxV9HyZGdG6TiS9e2NR4hir6o1lylmy7iSb

dwTSuVk5aeygZEXANcAVz6x7QR7shb3pBr+kpOo2qzgmsc8dyti+uXAGLod0OJirM7jikoFea7k3sOqK

m1NpzTk0sKa7T/ZgB7Q2pEJwaNHweeOr2rUi7crgEJ
7JQ3IZiCpDmX8WJutxk1Fz75TmGoSCa0Gnsx+u07XRNmxbx32hfF/o7VxCqOgffQ7k/pHDCJjIUChrQx

RJ3OS3IA3h7jAUIG7vePP2XOorTW7ImBDK/avAgeYTPWCPUdjsxX2GNsKwmSaI3el2+8XqVlF44xa36E

R4A3T5TwICjPu0YApAqXIHBlAaH+KjCe6y3O/jtw/F
2Olm6RQa7BIQ1/vZvRJd922JWf0i4Lbm5Z/CTgw4Yv7kX5QtwxzPnk52QbeS1N1qk51ZJRr+go9yWOc8

kh57/V6RZSd8PnOCv2mZ/UFJnuA8sO3FZtIx09NEcbZaHoMAq8MY6l03+Wf0vGfUCHtJXmFFcfGn56uu

z0kYyc0jXkIoWTugBHYKs+4I1+UaSwKQo15I8vZYAc
ShfRgUtA+i+Voa6jMAVVNg5MalcYjP3DzO5IOPTcjKOoejbcWW5V9C9MzdZGfYFtkgdqPTmtJdWu+JG6

zt1qfIY7vaoR25V1/ifFEntgDn+N8gVCE7FR7khOxn94QANAmzQB2cIvlajIUQcuaCYlWlZpBNbTXPQe

BBAr1fUW34kvjzfGNNQ5kV13/R5Qpfq/i29ezuUahZ
Zjo3W468t3sDm5sjmpBUV9tUEqAMXvi3gQjSm3zunqBoBnDiTQ61Wggn90z0plmiXuffQUwZeos5pIFV

EuMCLrYRxTCZqVWi21CURPwWjHTjLndZgvtzRAJQfGeMdCUmnp0G76N10qgkS3Mk7u/7ABDX7iueUhB+

7L4E790+Qrv+bI+cKwH7OGR38JQu1NJ2A2rj0BBrI7
ouF7EF5U1U7k09pPJw8WKlCOfdO6+a17lLeb9AYxTSVQN5rJWgR8NQPG2YwmilIQd5R3Ld9dAIv3tQ1p

Km0V8//HN+cfiiwrEP26iAMlNlEJSOwVx++ZIqipV35ifar4mfbreVvZhO0Js1H6yTUlrQ9OZRqT/TOR

0e7f3/nvcD8QQzh1Aee3QJmYmX653eFLmuklBK78cl
1R5FzxBpY2zgO8w6YlvQEElJiUpjyiEgp3w/pepLx+DdEAyTJobA9wepMEWHoaICFKepqdfd4gLJak9F

dn0WxBZ85naY1WmpDO7V/hNa2OBHsZVt5ei+A/Mpr5yMeZmS+kRETP2ajh+R7E+KitBqa5MDTznhnH0k

lG6KOQ/GzjrCflLW5LAd9ENJqdAVB3pBJ4c/FPjF9K
+JolNpoFwOJwZikm4WAJBIaToSB9vdwM9zVRsFTv5L6EnkxxNG47lUnfox6pb3xMRWMFoREZ/3AxW80a

aRXD/120vgEUz6iZcyQY3hacVX/lWU9jjw6MtILP3W5xpD6hTkP0yRYzIWhMbq5U6HqYjGnDopK/bTTg

3W7h+VH56uztwuqpdUzuNiO7/gXtqjep0EozJxqJw/
lUDXWlw021mSy7B4mTNgDNl+5FnVl4eOTFD6Lnjn8maZvWcuf6LShj0clI/j71+oWafS7aBNZvNp5M0C

SypiOQ8NwMTIK1Rkm0scOp+OEGFVATU0Bx5cY7gdRb0lJHClwsbzHLzVSTB1N2wv5QxxXStV/QPSCT33

usIzsQN24FMc98HnAqVx9XgU+7MIiSkbX3cg14Y8Ja
Q2pNYv3jlTua88pDRBZtuLDY9M4zECUM2YhVut6kVFO2EYMXWgDSb1mBMFWU8+CMFTgP6Ej/Eg9J5THa

ipsBug+MaFLG1lJdbvIzDFK6JiWm7X7g7EZI0P4lIsO5A0gjcxWgspSuxshiDZv8MBe1SOGaX896+4b9

G9mwLnzvFOS8OtvR3oe4D0YllHKaVSLoNsdjwYNhph
uot9Jl120HFMj1xVkN2PZr73qIgktzQ3/pC9ZSDcC4vNqC33t2kTDfammVVhtoJhZLP4/Ehv8fq8wRGz

9N9LPURmu+Bi02nL8AMfJPrHT1sPkcKi6s9e7zLhscNQJoRQRPToTRJgyLnpzQOghwgBHpYXNimufwSp

DiIAEVRQiqQT6muVjRGwt2a9Ede1kk3tl/c+ecWXdm
zsy69+N8qK/yi5btjefun1lwMZWoHDtQ9MzglOvV6QiBrx2kH6C4Zn+pEJrmrBTLRrQ/Z9LpRvEilDry

MJghzO5q50HmXl1oC7gRng78SBnZUR1h8JXrtXucbSrdlhuzqL0zrt2EntV1fd9v8pc4c+tJo0gp/hLe

1gPoObwUpAaOUz6V5GV6NAiQiYzgB3LNIPR6W9y4oX
OZtPOwQJYAppUehuUhQLPugdF3G+jns4jngAck9KWY8aqjMQJXKs/6w/TzcKUgFcjFKHVmPi3QoMsNHW

V1NdnKios96sIbhNzbSmIlel3COo0wyTvqRPRjvO4Sm1ICpC56IBxAojRt/G81pyQwgK26+MBEKuh9Cp

72Cw6odLJEc/k1TqJln1yHut6eiJSlTkWx+WuhfsaL
EHVA/lu8xWgnbajNUsoZ9mAcAPULjPqnUVYeeHS8cELEdZJDNVFMb1nQxSXMxLXMe4YGxydamg5U9wzF

w3gqURcAEsG64J2bhoYaEq1J4EF3Pgob1KkqfuaNVnWtwAuaykTX6xAvYbIld8c+czLsdAIXnI69TOJ/

jZ+5wB5RD3szqKcRVf4ErWj8JZjExKjIRGk6Q10IBM
DpL5CWZxg9IlHZs2Pg3oMGF7rwPsYqDp8lzmjzj7oty4Va1gwPr+Fewzv0tN0zEudM5rNkULdeRI6P0f

FkA3s118XVO/ZYxrxGAHpk2NLK8qbhU/dfl2EWOv9W8RAh3lK6NzYHIrYpfZ5AekXu6Swlx/Ri7kZc5s

uA1Bnykj2yQo4Acz/GCqpT2kdYg0WEmP7UjsUSQw0y
eGeXwxyfIu6W+gCknvJAK/D+rW0XKv7L78pmIACRkX5osf9j1G0MktRFsUg1M9XqYef7LheIXduWkcfr

on1f2ioV3F9L4pR6/nZcA9S2aSQJF0h54MafTGtsf1Ef780IzYasPC13wC1KK4+w93EBQwEKgtdg7yHE

lW8Bn+D6pMcxi6Z6gwlUQLAmUBh+Bcn6cjOVMCcXVl
AmZPfdmNmlMWzvIZr/k0I8WDPHzsQ4n+dC5zuw6S6dkLqRAvICDzWfdqEADfPpJD7x+3DRLkjo1Bj5+D

u4HD4gVaI1w9YSG0BdG7VMl63fU3mMMDDK76LoC96DpdqLHkpb4tMhVsG0NxVBsh66G3hzpPMu+V9+Xo

taqawBvQQvNmo30JiMLA4jJZ7wmfvo0+EeYDrPe/71
a1LnN3yFhq3pKKRMFSMRR3Uq7Ci6dosJtdujYicJoLWqnwBbvu5/KZ5//DetQwhuGOrURJkLtaT4hRRp

LSJBWgIARuflURTU9C7GhmlvDcDlEUVIcrs3jK+Tz1sWN9HL1i8OwiOaezoUBZF4XoIsm/pMiw/9J2IP

78iA/DcnamE1N2MZN+PJYr8XJga8l+63thXgZKNE14
9f6RfIFQf7DUrIPJqLzZ6u9Kt6lfX8MQolpzC2Z/PguOGjaBMzigxiePwwsQ/bSJ5QP6muPVHI3FQUf9

pF06fgUxBHTf5dUA7f0h+uTPf7bfjFxEq/qvAO46MdjY0er959AK37LZfs/UlXwOHxPbKZq+jDdpZpkC

oY2z1/BXJjwBECS4UtwX7/qxF8F7nChYcmACsLGmTE
uk2uLXre4xwF2/yNHIALqJQbqfwES/ReSPuixegmZhJtnXsB45sz6HHApf0THiHsJWw/aqt9F9yABoSh

CYz7D8MrRMK+Dc1Z4v1nX5jVEMVbpeHNc3arY5X5SVUof+Tk7AlSSA8L9Vca1E1D2K6f5zG656svAO+k

EHRXHFUBCXi5jmZ4MxwUETAo86X9GlMssBd0LutNlu
ykR6H26zb25ZMoKWeHEMQEGw5gLZrIi3suqsPZ6hHoGUjdkzomD/uxod0463AaOu1qmyzy2SXUjZFXJ4

g+WsCVzJqRa1zgrwV9MVRJIvznIIp7rJpMLbo/ph3Vx4uY/PTsQqamMPAjWPeOwQddDUEJFqcWZjFnl4

k6vL/NXdIPlzQCaiRsvcBM8EGhn8LafA7c29fmyj/Y
58QNQp4U4HDhFJtPXHRiyDAYiI/uNqKNkr54bY4y/CQrlx7R+dqQzKC0qNnhs4Hgt8A2gqDSskstVllI

M4WrniyxRIjld6LjNOOOsyz2jYzzwNuGh3GfrFwGl1LH/jbNbWC4Ub+6pBacL3wFwvpqdlqI5+o3/Yr5

Ir9tHLK6x45P4D2sESOytMh3vLCrInVw/cyBH4+Nelson
8EPIE1OlHoCxy7IU8nw3NYOE32dmlrNSByY7onF+1Xz6E3CUngoXvIefY8O/34Y5+a6l5aUOTHPT7SP5

q5HL2/w/wzeBKwnV3j30y4qk4HFj/4crS/uhNuEfn3N5rbxDHbo6qhzKmspeooVt4We4Yp1YQcTC3r+D

JzLNYkZxy4s7F012CkQ8IFUwZ77zas0lgszqZnP7l1
oyrz05Tgcpj7gODyqzd4fsMVgwq4ELk+9RSj2k7009BF32mC5Lgyh73DZPzz9yvvGpieuqrYS0XpkCSe

ULK2EqjGop1/ujiq8thNCUsxUqnXfoyeMswgxj59AIU2/2diycZgkaS5uhw8+VmBdM4GkvkZV3qUnZk2

5LYARWMKgCda/2PTmXS/qYiqIJ6fjOykJHiuRpa0QX
3xE7HwzMLbzhvJipwjpWtQkRuRplZV8VLDsCpu2p2yGMCwY/+Y3W3OhQfiSypFFmmAPrAmLvInsIhPz4

yohjSCYeQ4tzz5iZXWaKG73QhRwwY412b/lWHrmzzDnGYj/yfLH8XrmEaazexyiT981h3rAKX4HyLzsR

iwRiauC91aVursL7yZK6jrr9bfnQvJY61enUJFm6lS
1I8lDS+/Pm7fWYmW0LM+xapX/57fGAmapKBJpd4Y51w9qwZ2jl16Sr67+LOlyStpRPcjKNHw/0wJ/GeG

n4XT/9mjy4ygb6pDzyHePNaz1oT5CU6GNvfeMyyqWcQF2qbprOmpSWaa7XXO2LnBEJO2TC8i+qU5Ki9z

JJjNItI5p1amx8rbBSd9+G/PQmwqEHqvaXgt1E96Bt
Ogv55YoyIqPl2rpGURuaDU7CAZHjTk4199kY0I/XrVXHUk8QbKP0hoS7Qg4EJK/O17w0EjvaDCUD0Q5t

08YmhseQiKq32owOufbBE802w68CJHvDQwx+RY86wRpHNy1nnmpmb3aKV6x0T3HGqxKkPs2Sb3xIaWbK

Dick/EocrSSbsV1kkzZqaPbJZ83ORRPNd60PrwRJe8N
1nP78wCEJKnLaynTUCYqV0L1Sh1iY17dVX4AEL5N8/f9yQ/Ia5Od48B6K/R5X13Evto5NBl1E9uNa4kY

4HokJNHqOezPw1C8GpzG39bk5EJArLUWBLXToMOWcfxTQkg104nxacfBa765XEsKnPhzPOc+3rRWFj4+

vdwkqGwsFPs+dxP3gdFBuTCcpKKBg6PqpjEoShdBwc
6Cx33e99r2eRQz854XpTuyDSdiXr/TpcHHlPq/pvUXn0FwwSXDG0SqVyvBdQzV03xpH1eKz61ZK9rAsa

JJCvD5Zu257u5WAzNWqq4aMOb82N9VGjZ+WVc3aHzYHyPCdhAJYdXVwR1LtfVOVeYrcD8+BJN4zL5DQg

WXvccjJ00x8dsV8FmYps5VgmXvYm/0t3E2OX5nqBNi
X55tmRsN+Bh2NdGZw2hfoMrgzdE6abKj7XjrAhbEd+7ObFEmmIiep7w+0vHdi+NG4+9XZq3ylh7M7AH8

0F+X0abFi8fPad3eVzkCw1j3EBXnG8YTTO6yuyHFwUDWh7Wpoq/BPkkymgY4m4e6WRr09ZE7oyC20bDu

RZ+RFOGRTp4WYp8p6ZrQCQLTBifvaZUScPUwxo6krJ
SS8bykGCog3KSo6cCvZ3ISfNVZtGfdO6NVavAzL1IiSMA8kWsclZ1GrjrrDWfx2s1dgRtwn99xf0aFD4

ESjgT/XehfgioDuViYT5E9rYbYtKE1qlo+loacGBDcIpj2hmQJNauFSluli8HzPgQ1ePj1wxgnQTDSkd

3MU/b+fKziyIIuJUA7T2dvpDxJDx5N+XbhmrTBEzDi
rVtASujd8+xejOMolQktWOtn1zdvK2Kxd8hoPqwQxVqHaEnl5OQWTSRcQbojbcK0tXj49a4VgrC7GzOj

y45vbWoO0dmRmdP4bI4UzvIGMoLF/hwOXnk21h7v5Ms9IclWMPa9ywRnijd5GirDyJmUJLgyavqOYGTm

5t2P1suDUYLZPb4HH2F7QE6vC2i+b10oarS77JTYHj
q01/IpCssc39rjCvFzZ7bnjkxzBvxuOnXMiqELNqYPKJYutBn68xehrzHAwd58h/oBJgulx5jhM0x7kB

6Lo7h2s8JS0HMfWOyHdNB0AVQXfL+HgK2OYBIhKD7Zet7LL64X7Q5ZS9lZLgfI1WOaawAMSX/HJj4Sof

7MbKdX/97uBcT4VpemlJbBlBzAoYX+vz3r5ocr5JoK
ipPfh45uKtoAGHr1grfWKx01k/YWAXjOx8QvsB1K16KvfMh4UKjrTplMqFHlqQKKBtigFyVE1bsxq2sZ

+RyP5/ko7N/ZeXWZGNLNrvPX+rTAcWf5IOaSQpE9U/0JbldkYNgzSJ/PX/rcfBngUcInfMmjyMh+MU5Y

5NkChk63DmJVcTN2s0anARRrFvWZQy1itLSMnUdSpK
k6/1aRAYk27ptQ8fBJE6IFW5GglTLMWKXvM2a1JKtq6et14QiQLQ28dGBxCUsl6+JUZn/qKdhfmkrmyP

lf1LPLKMhalZeJkwDvhOVd6mXdu7QbabNj4caem/IR3Twv2TUf3Fr8AYLESDMP9uLM1iRGrA2rffzJF+

+LRc5I4c2DNm6HtIcF/uMue0LTW0G3P4TLZcZmtMVm
k9aIfwoI4+2UzLNJI1nF39C7NJftziSYTHnBG6e5I/eQOK13rWw8rkEfZaL+v2TgZZdIs5Lth9UEc3xT

sbhqtRysxXGqkJBw3mQ4mpdzUNoQvTEn/gzzVtmMI1ifgQEf+XDhc9cwBpfrZohdWZ4PYwCVA7X2sSDQ

R5GxMIRI3N63Ax/qj2BvWnYNQIsfi7a5xQYjE4yioi
m6qH34Dv+6IUN59AG+sfRtmgkyl8o+OrbD4hUigOc6bVSgl4N2+A1v42tJtcoIrndzV/t6xHb7Dyhpkq

coaWkE8U6pHa9gpQaGIaPxGOyh+WjfPG21fQ3CasaLmiUnXTeJZo5+Y01W/DX6aWc/NXUI0GJPVL7mph

FVu/mQ6XPP7gxM0W16CvVIlIqJFmy2QbkQ8Gt7ANcl
Pi4hMadDezgSIxwsdfFCWWNsDvXJ4qu70QYnycSMbjPQid0m2krfbMk9SWcLjvSNo/ocg/1lfo9z/qiv

cVyFy9fCg7kumaVLUY3nuizjYKmFyzj/AAl17hWE0yk/WGFK8iRvIQhCCtZFI+FUtuF4+cdYKQadUTzR

9lrOtB45cQssZ5VNgIIxRA6HelMavrysU/nPsSIEpd
QZlqscvDSDql1hDvAAvfRP1M21vOqyolvRkR/I9a8Txq6NXmGiul8OCwsyglO2EjlVIJ77IrRKi8FgRv

zdeXe/IGOf/CF/lDlU0Oj/1Dg92YAkIm7HZqoTWQN3dWd7p3mgVT8FD/Cw8bIkyvHDC9mMZiBlqWiFcY

g4Gsn2wlK790x+38YfVbIylLF9Cju1zyV+L1u/KWt7
+RDpShOlozSdyg8yIGZiayGhLDAiacV6CwyemMN/2X7nlOJQqS8t6scyEQN8C5p153JtOCGn5H03HuZQ

gaEDB7U/Q9M4JzqFXjw7SbRI/uaKJ1r4a9OzFdm8Q78CcrU+4hDm2v6F8XF3f1wa0dLrWHNxoxLNr49L

5pbMqfjlCQVTW8z7VZwMIkDvA3DcRh28oIWgm427oU
LVtxpQ+B8SN3g0em3NeIDqGz+MyyEugShMJDcGyA1gU/d/rn4kjWRuR5PQiAjPuI+UdvL+moiq5aXyzm

Z9eM91SNTTsPKoy7OB4lloqSkbegyyo3ywSc9l/qy2mJAVdSvJfDG1zI5C1TqIj78+hoPQ5d1SeqPXn+

EovkkDdSWoBukAf0LtmkmxDiitytQQw/wXctr9tWXD
zk0MvD/M36w2SSu1+RLPpDymhX/owHsH8m25yp0I1eG26wrz7iw3Qjn+9+usy7LGI9yn5P/PzeotIFl1

+7jMVYF+yG4V3CdZnQb4DijTBG6IzIXAe1cPdGyxz44KtKGzzbBrF3bkHb/G5GZdnJsx87q01H9ejjb6

fsBiJ/rFUl99AVjjM4cDfRdA9KnhdUG5uPK19nH4ci
Hnhj+CFU71t21y62sHxQCqkYJuvDRCsQfuk1QhKcmdPKzJ74h/5c7dZKYE9bsroC7BdzOBmi1vkKDIr8

xeppfjw2BJKtmE0RwBecEIzcvMv2jzJ4csq6IwJM8/SQ2WHLGQihxRCMPLKjLqmAq///otCq0nrC+xWz

PvVUoDghV2tyL2wDyWsC2q68yslV2aLbHG8++GtIJv
5H10kfTjpR8KZLHxSt8WR3CYw2ymTN12R24CwLkzdMRW9Rmxa39X2Y3tKm4eKMX0SGK1P8bncW7/5Z1z

PINvLPEmHAVUnYo+/AHNh724KvqrTe3w2+Q+2gfQa+ITI+AZfV7o0fNm1BNaLLJqvJ580eFl329qG2Lw

tOV3Iz2JRaSjHPKd5BoYuGvwwx9tqW3dVvl8OdsK89
TSfbwvFJDy9ZPfGb9gc1zrwJU11ZrqaqKLRmOKcgB9XCO976qlcUwl3LAF0VxBzYa1nlXyKi+eDjJDXJ

n+kUY3Z3uvvhD0ZwqJ2aELq8o4iFi18RspG1n25IpqqKBx0jAVv9pHaflq57ph5VsIT0jUk9BIAUxhi3

tbJ/jyUECrf8m+dICFoQ9BnNoDKrbQYd/RPrvY36ur
rMg+pd5vmWom152YNqmza50U+D5/M9pxTz2xxizjTzuWyioOGFdEWjIi+6PtHkZ5bksEbpFay9wP7Pg6

j3egBXWPaQsPpyR4UFEspKexlCRkjos6ZqgRfhxn7woMzQxEj4CbYbRPs1Kca0ij3hZR3jzTw/T1ytSY

BrwvzTZDUxpOK7N/oE8GKe6yinkd0NZeU/rdmytsQL
soSX0dgxG1Ak0OAqGCyy3VDvUpPcHBWw2UzcR88PNwA0ofJtpTdLJ6umC4T+cmpc1sNsUqu+KreDxIt6

HsaSJ/8/DTj8eDZb2MgJLIhkrNS5vAuuavU/TsYqnNOMyLsfW8vy0KOxaSTOTSPCMGBQmTlLInMKGKlN

2380LxFa362fIm1jh/U6v/1/6v/X+2dZAKqkUaXztc
kr34IpbqEYZBzJ7huwRa1j1F2ZtpY2Xs4bqAA0w7PFds4WLi51U2Pk2AqhAigmwHqP97zq/0Q4LpmgXU

sfweG/2PjMUGnQqzG6/321S/Ib8GiuvlNoPpjqSakLAvCuC7fwwWlEnFI9LbWDEatR40buWqTv0hl9GT

CTRki6TjGnUyXek2YhNOK+1eOTz2h7eUDf1WL+VSVv
+fR1oOXlab8hucIgWHxC1m3vEVbJHpQpX9GynIs3Nbf6GfJuhFpX8gww/+6GXZX7uaYM6ZRoEePzwyPz

gGvj4d8K4WBXRB0lpam0xW/hx6+9bscQom3kLw/q69gDGYHUkW2b12eLk2JqCYIioUQggKGt7/t+h6n7

HJWiHpRZmLIqWahfLJ+ojlqlyK8uLfHPl/ZjlrFTxK
kwjijq4EvUrsli3Chppa4QTDwtMU/bZ2FpJuvnum2hBb/XuEIVxRL8/rGZwUYsBwSvzSeNB66WyIypS5

z1L9JnOb3r/KLccOTQXxy9Qcw/eTcv7cwZ+WYgk1TanCROhf0o+s6mGHLx/IjGWLC+M2a+cczV9zsR7a

C6Ln4yO7Pe5SR/ObWBBl3QJg40BBeg3Vaea7FSMKrR
oG6fEMDbMbEbHwC2n1cVBBxvg68BlgbZ3+TjeVaOBm49d2GDK7hyHjwzLSdHyGeu+mgUeX4yW9wzmm4+

u1ygUFA/3xdi/W0I0Yfd9G3V/NNy/t68Fjq1J+wCwW+whTif41Y6V1Wdev657i60tOsqru5ITshIJ0EK

T55wy3cVan+YT52z4n96HhY5md3mDr9rQtouOKYk3q
3BawiYjXgpg7GvBeQsseOkyXaLyOrEHF0ZseJ0guxUkaA3S+DCOA468l/Hs77/9iWsS5xM8jqbPwf58n

g2Q1cxE6ASLOS8aaGd1kFrVU/8JsNRzxE0xHCifIJbhi2XEewCY82yT//9J5WhivVg/vv812/+mniuTi

I9gSo+32wDBzBDl4Ddl1eK6VKW6EZxdMktk7BYzSGt
Micki/qEtVeuKCnkg8zwUZYfEq++wZK3hKYiTfzvpiQydFN2uJGAB/0XjvG71pyJP6AvWiEm9mNwV3omjl

/fJ0ilKt4r95fyCLd57eIqy+NXDKF6DsTTv2ncI8LWnhlJE97YrKBUkkPfcJFa40e0upZ0ikQOk/21WE

5ie+5/N7m+xOHFg9lizLVNjvV1Ws73d6wke5rtcvh4
mxLtihr7GNO64wATIS0ku3rD7ZWOkr7N8pmzBiH2vM1hq0fgFtwGQ6jGfQGlfH0BOUNz5lt1FzA0ehYL

/qR/dLS2SGeCW9enPVRVyD46PZ1fqWkR77f1I0+mRzFw/gcv/o+sKfsRDnalSvUT/ZHpZnjyY/Ud08Dv

IHdyBwPAEpypIrwvBmJmJxSV0H73S8MrY2xo3OzrM6
y2xTP4/kPdPVEa1nUfeOfD0D3kpHNkylBpkL376jZpSpcQvWqIOdS3sAZOGuqS4zZ3xtmSb3yPBQioYV

hf/CLmlWpTII+4Vi2uhVrbfAQBa3dZU1BDr5OPjh9QGNL8mR5mbjQ4mcEOFx9qIbWIGn5n4b8ZlVwZvT

6JAYfE1F8/9dMRhTGmT/0A7jeKP7AwU2rQa9daSqQo
4ewO/KzwqMY58UfypWQHy/7tvxZ/VRMrPRXbZV1nSQ380nsdrmsRWi9SpzDl/TVpYTEEAPO0aHdrNrVR

pkLpOEcWt8RvRV93j3sMgh3LIO9lLBm4UoZGINfZjnZitwf+srlrq15Yp6spKtQLNt4fHoH51DWHWVx5

bRCp7YhlDpakIFqIoiRtIccks1bQyVvD06vOwDsZr8
k2Kvyy3y6Erz4Pl/h397QczzNcHrYTIHksDJpTO3Mi1NzmebV5IglZCAVt4d9FoC25EmdcuXEJwGkGf1

dBZT12SSc4GzmVClmMWpX6pwa0/grgr9XVTN/7eWmMI/KtBSMral60fHPONYVc8fy/j266Qneg4blWkZ

jMQfpy2OVXRctWyq602Sc01Om/kiEpSUXU0jbnyXmE
OPDqGV+ZS3uxNrsTaaCGyJIG2jeszONAHYzkkxAVEigbi4sMe/proqc5qnx0OQRiaG0J8vbo2JCO4fIB

v7aqg7yTM/lA5I6b7c2uwO19Jx2h6xUD4w8uw0uDaNSjflw58MnulfVO0TM6dSoGhRQ7xwmOtZkQc55v

I3HP/PpaV7wY6iM2rU3+/KBRFaLbTegVZubFrEEeze
jyVdH/SJ+5GUY5K7Xb6v0zyMZbPug+F5IF9lIm+XBiAl2rtEgYrF+Cb08jDCE4jhgTTHu4Y7ltrCA+Oa

/D6PysPDWfUSWQCVZ3zU4fCbt2B/RpkBmm/IUEI0AwPuZx33GuumWYkH3wzrybMDAv7xbQUGfCyXgB5Y

+gl0jsJfVXQVSULXTh4w3o0b02+G9gOcMhyH5mI1k+
aiiKo3oPn4QhFWcYOpEnBJ0BKz/6tjjb3Z/KlTIAw8UYuH8EYRZUk8L6HqVstnjjpva+4iZ2uNRgw+EZ

JEH+4xoeSXH2kTiPQM+FJQE9HJqra64VKLNdthfq+BDjVeKcT2knPN+EmLTnhhabnIckJfxjsJOcVprb

OrHR+Vmxk6anw1uQtWnQSF7UVVFn2G34XbtDIVn+ZH
2q72mE2JaqOBzzoEyqaX1OiZoL/82gP8tzfoTbR9bdr00x7tdBuWLY86AXKDqJbRmxB6olmjZgZPLfyT

xRwLfnw5Rfuxk47Exsl8sIgIbxFdCm5/ATimKxF0ZACfQ7KI+sT5CzdVZ382cllFi/Q98woo68use48i

CSWVMTWR4ujfmx0KgKapH/htp851hMNAryklePi2Mt
UjCk5hh5FchKBp6HWbPKNmc7uK0EzW4pMGePG+nyK1xQaXqVxd+cYziThtRc2TJKhB9dcjmZT0aEmStK

8dnf1+EX7hguslVRnlwoUNHYQ9yLftohWqjOHt9kL4OQGftTfb2/KXTSvc+rJn4RDoslqUn/MLkCTSMn

75wqIRTVcBwJ4tZrzJbyYb+usP0+q+bP+11Nb2iW5G
Gc8KRNwISzm/cPOXz1B2sMwOuRESmKU3vp+Oap6fSJ7pANvFENrWSU1Hbsc32lWaeILxVQ8rtpZT/Ndt

Ep6/kn4DbCZkyfeEqRTv10QSLux0LbroGnw9x40tr6cXjfK4HVZyBh0XqA1jZx6KZBC6aaTeSi+4sZeI

ejfbOdJ3p/9hOwpOCx6QWibzCmYYdQ0SW/3BJFgwpF
vDGWTwU+Wrme3nAbuH+LiBN/4i90yB2hQ8RcSuW3Jp9d2fG+VHPDsGWA+XQ9VPn1wYPezLo5G30+fpBF

kWAzQp1QJcMtcSLyeEgRv2mCNyeObv3zmCu0/x5BD+LFeEpfT09xzZNk+nXpnKg+gZ3ajSuZr6jqbdVd

gfeI0bBzL08ce03x12vb252Sjqwu5m1FsI3fzjA+Kd
6/Gbe+eMUxU+JzykNHrRfXixsnn8GNcd5bwHAPCPPx69oqu+3hHf72hNOQHj4aOZ9bVDw8l4aXlhFscQ

Dacm1EnjrkJtDiwa+wu2OIZITTV2PCehMc739SHTO72Nwy183vou1NZA13pgEsjFnpZOeIfD4+NMbytv

DybsY+/ULqPU6nYpIhWb1KXS+OqwRnWmYvlqZeVrCT
ieUsfCobEXthdYQ8nkw3sfqSkfiaPDypQTcozG3Y2pi5mvxnPXfo7P6nDwo3/KFE5niTrflSLo7kFEcc

X0hjxzGm4X+YhgpBYg5ljV7bB8y+lAvXMsmuWm2L7bv0DrHXQ7Z3T1Vbstyw8oC353KwGkF+Aa3hRG8d

XhdmX1KZgzhFIumuXJkA0qK+qjyRUPszDbrHni+zBe
d7TdlyLdVHUDJ/ywNC+ORdNOjTDpjJwp4k8bzj7Ydt5UXoWqGm52hhX3MVCeOD1aA1lumTR4ytfrm8iX

tDxN+Fhi1vKRcrzY4NjZscenGfMA3VPFWiHR3Jq1lx76qha+NlwO5o7Cp18r76cDDtpnNzZCjShCV16I

pMlkSyR4nozuJ2YpSytmZqr8Onq6UB5Wy3+fY5BDNk
KWayBPRnpg9C8ogM0wFg+S2S80zC5IR+5SQcCYwOwhAvf1go15rsenBKJtDLs5bGEvbl/b3PUz8rRYau

ynA58scjQI35bjfM47HeqZDyKDWa3vlxGapwdKhvbPmBuzL55hBa6fnw1tfuvtUUd0eu0si/TejmgWwK

kaCbf69Kew7KnzsjLDVwNhltvv3ANSjzyfVNQE+rzY
c2I4dT3r4EodIO0mlf1YuYiF/5a1gLL3Oh2zk9/Nz/qHMt0Cylnliiemdyv3b8sGttlXAb30YtkUM8sn

X14Pged0pBHMRqpqB81+V21RVmCfCMLd61U2BgU6g47OXbo+JRrJz79yYdcSNL47D5AfCVcBrANgrdJn

uS50E7HnAOWveqkBSecu0GWJw9bKcS/I78wX4jONpz
yw/gB2p1sZLkfZSkJjD+nON7efu+crESr9+cwr7mBCDRlYHMRzUIcLZnkkkOTnpcD1pvIlZEYVrqSXnA

5OD0Eh6o8leHdjDxP0pQKvlf79bM21TD0BQfMkW9cBX2EWo+yIhud9fEhWhxHK6lpmadJHZk7EX6MyH6

8c+V+TWzmSwOStRjreZApWH5vrv1o6p4/Qvha3sVqX
4usLa12f03/JO6HpcgcSVmGohiuaezZCBsaAnYFo8/1JaeSqqRCrDBP8fL8kG9Y24YdhthbzT32+pDB7

KEGhVAZmZxWvFxuYUXp/sXn4iFJsSj7rgnC8RlIKRLBcyq+Qhy1e+khccDWntkN59fnk/8zZeZnrSMLE

sISjuL0OoEpLOXZbQHCazkF8t5Bkw20KIitmTOP4Cm
69VWIc/8W2MQFQVZf4Rri8jAtz3rFMGo+8FIwYDRImXEechBIreb133bZjivQhZLASAPcHfm+5miFO3C

Hw9cM/jIHznnAcKhSM4hEj/fWviZbkzMmqSJ7l61ZavW+GPqe1vT1OMhqdHZy3P8yUrZJRZ1unOWd5Cj

UfrI4v7pM8Lp57DxJe+d7oYiYb0/8FiPoFTiRGQeeg
nXC9DGN5iC6QYG3idHnpQPPdbZ5dXJJk/LEjlAbwEwVq6sSrcfT0r5W5vbTAeL7jtlq6QL38B25xm94g

Cn3V4UCnI3bokJow7noDci7xDlWfjjPTe+/uw8cBFfJdwVgthNEG+/5Ludy6ZduO8iO3Ejcyr1tkD83R

0NXPjeB8gGKEVRRFnVzabhp0lDyV4z9hN56nD4N6k1
1SfHm8qQ8p1fA/FMkT72b7h+fsOgmxejU6ZmkB9ceI30SzxIxKxdv4f7IUDN7wEFDnKxv9Hf/xL3rqu7

sIOkMR7DNHxpkSrtofyqlqW6++eNOcgKX3deTHi4hdhSpV/RjWJh8d9AIkdoBupOMAa8j3+mqhFqBY9I

kpr1N8eIXbMUckjewtTR7Nogrgv+UGhYZtI4jJ+ZRe
ASH45I5wRz2j0g4M6rAs/GRNk876JitolA5YE6ov5RO0DTwiHJg9tYMeC0dagfMUDq8JFOYIdRWx3zhd

pAyJzC+LjfCYxa1PSSFfiOs5ilrzUFKP2X0+wqlmrH/Ly5hn2YOdGAlDTrTDx8KMPgqAVt2FBrR2yT3I

d42YiiEAf5/mOmlWt/21e+OTDFa0XziezoTkGxyJrs
ZTeRtVS8iC3ho7C1grfhxCSmx3mkd2EH2JXlvRGYuwJhwgxAobCTt4Nitb9AEYIrOjKA+TJaauQLrXpe

6Q7V+Gin2h9hD1ljWz9ffPmz+LUsKKikb4VdA+OJ1OlyYodYo/Wx8hO0rMXdO7DGt3zqTS+pmdrseqjp

e1y0X6AAT3WU+D42wVarHNBu6f9ERONBLaSQ2B8Ocs
H5haDlgepzvTqzgC4LniZgD9CWMCRf536LURCiSX3oYdzQjepxfJpO7KsBEALV8ENrhz+Djl/0ssXiP0

Nlc1nEdotTIcKuT/9aUTgGnOvuCuzlVK19tpLfTthBe9v5z69AHlG5XpWd89/t3Gu3/HlgVHeG23mmX0

t2ZfNeNQx+fO3pNpa9CFuuPE8SFbSs6wo99I1TFqkz
IWiPPRn58wn2qz8AVrlP7c7LqVRShHr562dQRV/sbKWpIbNGFLowezLSJPKq45zqPOWh88XA7eQw+M0r

+fgeSCaR1sn76KUT+4iFHLVI+H5SPmG3fPodZ6uqBDvIBBkhwr3kUi0fYyNYNdO0Dgp1jRyvxJCLcMYG

SRiw9I9hAATN2vvg+rEzAc7u5R4mTp01YRHJeMxlyj
87HhZGFLmY5V+hIRHnJvEV1F7GmxCLaBFQdde9N8NguEaD5lqCcPUGC7+tLZrilTA9XVVL1fkSw0cjQT

246JnRxarblAq2GErumWBqDBTh4rCrMhwnVjVeAyOoZKJXZUeAx7xrPE+x7157EAmneWu3AJDufG7vFf

kNkRPSG5qDm9Y+9KXvNVwQF9TRnzsmCz43fs7UvaZJ
Iluw+/4yZKCKNmxwuoEISV6iDMlNMsYPV3jUKgQoQbkY9xD4+zr+VRrl0YpRun/7kxtIQjTLqT8GGgSt

0l/V1k/hjej9aJvbInHD8MghXj1NlsugRc9JLqC85yu9wVmYn7GV+qq1CU5l+W2c8pLAF/MDZwkB7NzK

ZBX0t417LDou6QXIRhpriHxr0JhNplegoGU7bdYAEu
OG9pX9jC0Oxlkujv/GZPUq6BcZIA/St/t8wv2oFlZ2xQ68iv0xDubuyKSRyTS7b5fOafYYDByESnA0N2

vhE8FT1yGXBxREBCzjy3xUYfo0lZ30Q+6cneni4yUUoia0XB9n/M3pAkq66syIyXRDBoAv7Ak925bpGY

Vm4c5QNM0Z9MliK5WbFGbn2gUCjfH+Y0t3lVFPKBdE
Lw7DfxYgjU3WSgG0Y/cZUGjcPNfD87KA45NGovtLrXEINLsXRuoe1Xo61FLHxVmpmJwi0qTnctv507zd

vuP1obUL9KUa7yV9izL/9v6JqjALEoUaNqMu22hwkIZ25qZI9gDXGy7FwcE64SAVt3XrTBhzN3cRZqdA

BlwR88tWuBzZVctpgdofYaedZ/5nbPhpTD5BsL9ZCB
rieEQNMjtNi6TIOBnlfNPR8GDsR49gCyJhbmST3SwNWHKG7nRmhJfsFxjjlmdzgJosWhgb2AfW+Z7ay5

Y4w4GzndW9GUNr3csoFG9MNmWhWdq1aAEqNHYM2GzkxFFrUWlMjoC87CpHmZtVQghp8P3JJo7IJrjPQS

5VPBSXAgzmeS1oj/HcOlaHQvUeC2Otxvl/4SS9Fz+k
GLTsU0SlznugViJ8kBPMPTf9mtw3e55nXX9+7eOR7tun0TI/6mBfipNMMyKthMRKYG9+eX4FZ/9XtJFU

qaJyPdSCr5xyFzqrL/Fm8i+RM9UAS2lsvVHJSqCZYWcMspELR73yQM3O5pwx/4wvg7ulNuD4U5a5shDM

Mb1JtDT8Mn7fYWQ2Rs6QURt71f2nZtN3PhGheDIRJP
MD/VmSx14GcwvK0u4FSGS4jNZSV6GWcQf/S+1m8uOl4TPqDGf7yB/Cf3LW0n2lAGdg/Ho786C1zZEDEr

lagbY8ayXB8aYSwa6wcviujAPq1hHvnWMCi6GWy3YSkQUNwzYtFPjK/fGWtUXaH03J1TnkmamKjxM7om

63euoUzxFb8AvKgEbtPa0R2QwUyVL7jF7zjuve+bpq
ZleDxSfzOZtm5Afdzr3vQSQ+v00mwraUi4qcEKa+KttYoKSZazzj3sj+pKUTvo6al565Oo6ojQ+UiQ38

sIOQou7VmVWZuPwB/Ei5Fki7kJCXoT1gW/3G+md8BWAx89fFUtwZrL3CAtb2goxeFSLJS/e9d0vZGAME

aM+fq6VFvV8rWzP3RTNI/X10Hh5mfEGpcU/njaguzR
VAXZSJBqvPFjsisf1viWtFbAwkh0O++RdAW9+wwucLY3mSaK7mF6Z67BQ5m+TEORSm2vgUIV/SVT1DsO

y8/i2A3O+R50gjaqQpd7JL85rp0183I0QM5rHE2aWSvaEMIh3+m9aNeStMjYrXoqVaZcjRxA0OjNCASf

38WNoNEWrPMbhjG8MR/ZetQoGuCs++GWkWbZuF2Ga1
gM4vf8nFbGFAjBjHrOFlPPXpegH3VplhHxQ4XFDjkdiV21+tvaned6jg7oXyUhfBFpiufqlNu85iUdrX

mM+Es8FmNeM78kfMoNcVVv+X+cHEwz9Gvz9GM43hCNeFeDcR0rjSlwIDXQQnbWdV/IoQeCHMfO1TY4Nc

sZcdC2kcKyeCgf6spvT7DBpv+GFz2dGKUBwEp4zcKF
G+RnlumgIKvdgPTx2xCEMBsgzSaiz3pCt+ufFeuJAuq9oTcto0I8JgqPWpPXoDiWfY6hLjhlSzjTfg+Z

Nf5gPNF9eUOuMmMdjWBlbjGiR3nP6+6wQiXlghYFyHuoEucnDohp9RP1yCH/HaLcvGKlGu3eyPXecP8O

s6sRiq3NxtREDxKQOoWrONwj93VayRqd88U7wzLaCO
K4kEvh3j6qR3tTwx7ackna7Nq3/EvUHSd/YNjYl4Gi7pv0+5fSDbpOZoV1XEiyRNaVHksj97JdAQu+mF

1FxXa50LjNqGqCMxiejH/HK/w5JJa4RbxiK4kcbbNn2yt70Ngg8eP05aLYM8wlpw9ayPGkKNpo6xuZF6

D3GBJ8XuFYSUbTWGdbzE2GZv/LXn51rJv+uRcyRSfK
GFsP2H3cGbZGi60TpGQUIe36cESz1Y5lXixz1rs+buKq6wDpeO+6rbo5d9COAsqm23Q7wfcMr70jz81a

YEMoVOIU7Uc3hIcDa1lVud8dF9duFUuOFQS8yAuZVEuz8jnkiMTDVS8q2K7IMflBJJnaQJokv88YhGnj

dGVhwzI8gOVHMNXg+t7kP4q6rdlRtO+HpdvDo6kZQZ
NIsRShS0UxgPL/vAWt7ow6oVAMcUzk1sykp0U3ezkNsh/60yVQqIxi+eNjacS6KHxl7W68ZcZs1+/qvw

zD5eMRFeEG+5PzqRmpd++jpQuyF75iq3sE7EBx0pPRfItEJ33sY1VVDQ6XAaNxzlScKA/PSSxYsjlfWe

aLWmd7abC2N2tviI5dI6QYL5I0FGMDuqu5u1B9fcqV
fSuee3bs7DcGyVxaU3BzN0XxJmQOmtbaTp28S8aS+Gx/fytxubfIjXZavxAa3gpYilMDtPbTKSxxn3Py

/DfSt9KPGAoe4IFUdQDup06Nvm+jC3U5hn9j0ReR47zg9XZoKSwa5RyXg/Peo8YP3o8NF0vZAvO/iDgX

EDPwuSUWjS6sT1Y/smKgvb/NsdvIpRgbRBt1wFw3HS
lQ+hXh+YWGPrwnpnwzJVlflIfRXrX6Av8JFyFyVf3bFi1xkjV2uq5rrdOjMpZU52id8DEQcRP9D7pz36

j6y38ZQU+VrIZoiaeyDhlaq3/wfu1tG7Cpy29oZ8zShMtMTbMcJIrKblGxega/IlGiIaRYsaNjqxtm1y

f+Im0SaeaKchDFOfVCVtaECeSoYbrie7fDEoYPdAJs
YkWeMhahgvxQ5Dl/leqXcJJjGYSMy9qmpWeqVlBj3V6NewrLnoRTN/W20ta+V4BpWI17U/DalYCJRQe9

WepKtzf6or40CQuapEmFfeMR9pHxI2zNjbGN8LyKHeMvN6qK3KMywRIcWIudMSeNEljFuUPswbmZW8+o

7xalGssoFFDl8EkeFNmmlURKa5t7xwL5qM7tr9SNa9
1iaj1m1nylIHk0rRL1b9e1XW21jRppJhmKD7GlmG2pGxLPXv+oHe2wQUI0iiZqI7ArgglqyZQkZblwYG

OpZCDdOI+eSjnlDVRTiPKBvwfCNsI/88gxPTJ9Qfy8eZebpgQL0jsJ84SwKZ4QIcXAGIr+5CuaTNPQJG

AyHfePqhfrCMlWnbLnxS2iFtTc/ao95BLELwtlsrYM
9kUxonD4AbfaF9GNW0WEBSSilvdbDSqu/JLlCXKs+M0MHKt0qNEjgBYYEeBu39IFe5FTKJM96JwX5V4t

qyJFVsweP8tf1LZ/e94hje2BERD929xQeoHboXV/TKfVQ1nyfWMunck6z4HhVBo3NiE6jEOLPtep2vQr

jfzOtCjDQm88OyyNWO2Vkhi/O+sUUCN66yuo/x42eK
/8LbA4f2buSYD6gc8CA1aETrU92Vv8a4mWHlb6fSNTCknG8el5ZnMkCYB5oCaFMy5KOmMaHlQ0vi69Ve

PLMOn4bvsE9BP0CWQcmV/lRWNaY8i5Uc4YA3vFsiKLBNg37gzhLkmjIR6fcnrHYvpwsPjwLaBW2ciX0K

9zeRCrTR3OOD4S9/Z81PW9KHcuCPYdX1pC3zXBkGT9
SMjhxrJvlauzKuWkMT2Csd2uZk3IHtQmxWrP4fg7wHiGo/mBX9cIfIixGxmlubozYHU+lXjvPrTWecfT

c5heGj4nUXRBZsJyMA260zhR9Jh3C6IYVy/v2NC3hC+MdDiPzSVq96BCl/Wi1IyOMSZf11JpBXdo43+1

pT/Pz6O/gQSTaGQhCGzWgh41XBH5MhSp5hufldRfcd
o4PjVNeg9WYy8gIb5MOReRqdZkkFeCpCVoR0V0/h+l0qlajJnU7M7+tTDK4XAZ3j6dXQQbyBjIAGVnID

rESdNQ5gIfFy+DEK2ZAfs2aFnDjK58zgurMa8U4TUbOeIHs48Rk8M3IsVCDAwMIM9PfiNj2nHh0RhPho

ZnSnQCoR0I2L8M1Ix3ZVg5kkeSNI8Diy56k91ZmTvw
ZFmryQOqwonlcVG/XOM+4cy3sUzMYZZafCrKI6pjbNeVtYCF9+stMQjFe523jJZ5DPM5wFO2S7wIB4ZB

AqaVSpbCJ12p58Gyh/dONLMUmNv5PIMsR9/upiWITvuv/Nc4Fmr4dbeRDe3B45ucMePbCOCObxgBR0d+

dJn5q/183+FB/wXe4hPcWpPpprgLMnQc5wVg/u0V3i
gFog0JwzN1NNh+A4Mt9X0NUGCdmDJDSWcC732S8AgkMmxIzASYozHwMgeh/E38y/ncFQWOOIbmfbaQc6

ESzY+Vs8LsjgcfO3rj7J1BVQ5qFmBXSZGkdFscbw/2auiuKAWIZI9tHMGVG1DoKcQ2B8MR7ArOcrKcB+

ortc5Yvors9+QgF97E1ugyXVWwgYnysRsZR8fue7CQ
2Kd8zkpa5Wf9U4auhDhuL2py1DFiAF2kroEzQy+UHBK1al2TG8cPDUQCpWyVuAeE4SnFEE9tl8J1y3fQ

3ri3RlXN80xywfBIz2F6BP8hy/OHxx5vvq5S09J065TC8Extoi5y0c8ByIwn9/vTXIvwJnlmj7CSNhy/

ZaFrLjjZY63IWNCe0LhpvFl00NTxO1gxk4qhIOduIA
X6vhqU//wYHjiy1kZOGpaExxKm9M5CEjg93ZYNOQhi1mrw21j4OxmeLxiS9Qp4ARH4aPRPFT/Qmhy7dN

2Tpe9V3KoA7WqdiA1DhVPH7l5hyN5KPd+d1wfdzmpUvKXZyL+Mm2eoTjmH800DeZSuU94RyaqpWorV3b

dnO4xpxWDWpgbC3E6J122svdM7cr4hQQOrkvkqyyHG
s3R2X0Nb2s9AShwjtvTYS0uopMtAZvbHBXr5pdsJ3/JE77xn/urFpH5+pFLZuOUIhXQzKmqBTMGm7L53

QCcup3fJJAfb6Aaqu7dwWssHvBpArxB9qYxtTrlnQFUdnxAYE7CJLP6hx7dE4w+vKh9GjdvLtBO2aa94

6ewXHm4rsiHmKyDoRl510TJTLPUqJivoKw3/ptmPc1
j4fHgdyqUOwv3nDps12+DhnezGMo9cjLm1U1wcWJmCGh3sGtWg/go2YDbLuC0d6Sg8PqBnPI2Aw1hevz

kOVdR9ukQennjXcBYDiu/PA/7ayAN3t1qQJ7G9kl7Erujmq0MlEMF1rApd1eqJp/S/fYanq45Cj4VM/6

/GqCBjUE3wf3LhEQQm/DOwlSKbVvmJ5+pwzJkmfIdX
R20q33ShJOtNjO6DAP7czZP0MfyRw9iwfITFjOQzabqW12a+s1VsBVojNGed+qknfY6qon4o13xsiua6

WAkjYVjJr3GXxUKhsQ79H/2sfi1yBGGHXpnaAv0J3Cqc9m5Au8rl4GUr217dYrYc0vf1utu0lWtlC5fA

07EE0IhfsFaJ6zMQGQzHYyHl8Fa9iX0d/TsUH2x5T/
uyf8RgrlUP+9YEjIXokDgvpzRFlHrL9mvcKowzyNHi1dpO1IBGZiAc+qItGtXTSDfIyT4gLwO4UV5EPX

kd+NsOooio2tkqrrTu+wxvO3wiGXLw0nrS8Ht7+cft6N6Dpx9Zm+hd1WSxySvNyZTWnNhlSOiMZlNzOE

k/Nj5f7yb+64YgkKR+pO9dxOJz9G8KCKrNAkrWOsct
QygkK6hYgdx3xNPVs4NCN/0a+hRTZwBDsJgdtfPDfhUHqsQkGTQ0PDFiuxgEknQpaDKSQDYZsDf+dRe1

lr8jh8wDEL7Et19+W5NPVwsYbnew9ip4w7bA8LfXenFZ+33KAU+zkz8yZuqAwaboIpMxOMH90si9+JEq

oQQ8AjFi0wzWv086pZHqfaNhL2EWu97CoKDP/UYfG1
o/IXFTUiK7tpBO2voa9zUw6TvsBjlA6vIL8qtBxHasqg4zjAdxXyH+MSWAFdVsJYV+V1FR4VPAv44Dls

wbGzS0Rtui3UXwTQ6VUjfxZwPzw8RJVgRGRVArs1MTtpCr18x797V7uH8SXoR7EgPRabo/NpCjKbxJvc

Fx4RiDz7onCybJyqbgMKsS1j4gG4O/DmYvs7bONiXB
mXokA1MtAV1nnnmK+2yvfihC8SDyu3KE2L+nxX02Blu0+3BE4at6Pbi2xbv604a8lIIt8O5YNtZY/46a

/Oc/krfoAAfyN/6/ewfa1f+M6Xmc1JD6gHdcM5iyRG4KHf2yOVuza+cEVQBmEgtO7PKE2esLcR8kaEXq

YqTjhETeLc4Arj/lIUGDW49sr9hIXfO4K+4TtI6IbN
TTDmda6DXV9aiQTnbovkAFfa3Qof+4FN8SQAbGEyo/IlHo0JStKjB+YwPtsGVo25o7rC0/pHjupDl6pN

+aE0gVrscRrolL1lZDy7d7YED+Ro5TFZbKkpnMOvXlrspu43vvPJcIohCut196H22sCHltEJ1eyczxo9

6oD3OZZATk7nfBGMMyixpGxX1dAvza4h1/H2Bc7FDS
RurlytFgtsBjM5lCcUPrwnP3l46zAUbuv+Jshy/SOlS4ISWPSYs1EiADA5ZKwiLTq+0lL3ne7H9dvc+G

oIyPZ24SXtW5G+6UEB5xey9P/a2+iiRKZdEtbU8UWx4jHHEY8Kx+MzfBNSMTtwIkc7GORRKhjDCkVRIj

OcY/PTiQ5v39vVM5U6FAaawIF2gsguaK5BqmoyEOew
2vaAD9GBEuDNssu9RGi21CtY8f3IAWMOkD80++oKcOlw1/kIUh92nJcpBfV7Tw+BOy3OtcR7prOhiqfQ

ieNQPBYD7sBby3+vCpcuRjRVUp+yrMXrI/D/lr+02fK8azzQx5eRlVRFjDS0oVwLtO3K7zh/g//pIQBf

fNcYJgC0xpKbEhD7LHocPAarOJsRkiQVqnGAEltLid
fT04ILh3upDflMUh98BmEpDbtAdGVZR36DUU0aaTqDKNhTAgbaGwnbkABWqvd5/0FJJFetXqleZ2ZiiV

wNE28jppe7rlGDrQ7JZDPJje+asYNGCw5OiWhYjcz6dvkYqGVAq8jJDftxDteBwDU+1hVvJ+W8IuceL4

Mv0r8ZfSKjtvOeMXE5hwS7NBScIsccNlUDzIF1q0Nf
IHVWjvggmyZa6GXN51w6Kxz+FymbTTycmOZSAk96uT+LB8GDRzcbTwR2EZ+lbPotfxI3zwfe1JGYEXqX

8RO8WGIFAccrQfNUFCeyJV4xgjkvE47qqEKrbidUIySLDrVuWN9BI+3TYJvl1bR5k16/Q2UquYjXG4Ph

+hOKEqsmM9Mfoe7jUXD+7rOEPRxqCUYzvq6CH+D+R+
SRskS4lwVp/t2A9ueeoCRDhgk17EFMJm3C7KCTIdC3op+JuEAfFtQBwy+O58SygWLLtAEIQ1O0XL73jA

3MMGJENSc4wsNmmsxztpHByztewXM0jw+/UCD1mERsda+zJsYN56GHdLtL7h6oy56ocbI0meGNi8khy/

HQXpWB/ax3+NHU2kcCqTTM8WA27F1+xWY9AUJzpQ8a
ad1UjSN66uUVSi1RyiE/mBdgEszU9jCDX+zmSGr+ty7zB4wILt7SxAeS8LibRjbx5wscl/ahUjW8+T2/

loMVQQb6o7SxcU3SjOhmHzO6jwzhFzPx9N4VSL3UdogmV+EY3zzdfuB90rTMeHQont5MqFkZOpyOwpD/

WwB4R5Zw+MLdENde2VO0/2yNXMmMfpGAek49YCEgcM
qsBYtpnxnwi0+BpRjpkMfapSJ65BpZlbyKiVTZ9NrIX2xbcaMD+i/53TJ/3j4vUg5YaKq0axkWUT3mrt

S5Oi/v7066fhV9n8e01lw9rGZ/Pou0R9tzu+DzdepAMX+pXFWkEhWV3Z7d3k0/PPcFxNBkwQbsGu6Z3g

qnp1stN3cuTEmFFf60pw5nyd/rcgpIVHxj65O0jIAY
AILx2aegqqyt5hd5I6eiZcfUth0CNtfBXGwll6LKUpEG8VaWAkaoddsTQy028lZBOGun6t+9IIs4brtH

3wNPVprHRv3Gn1dKT8mzjr1ggDS5+TqecLZkm+2rRSUnlPrO7p30Ehl0wPDlU7y5Ism1Eiib/PPK7QiY

p/dXMZs7osCPMlqE1w8Eakpp2P3enYmzIJLLYic55T
oK+N6fLn5AS6pX2uZaeZuDkd2YIPeu+PvHDm+LHHLNHvJI1YLN9z2w5w7k2i1IoCWk6+hW0RbY1qILrc

aBf1PJGfsnmP9V2OSUCK9uSzLdyePkevM1XReh8hkJqvBlhLgBYX1h3/+vs34rTydbsyYUgs9iO/d+9r

Ho79ql89jVlmh3saB20NYaz5ZyQgaPX7sMqJ4kgosW
yn1GUnAYkaWSFa/m7EnRovBZaT+EGa3QZuh/R9EK4qlgY97Yg6R3HPAalM/k/8RPdg62kkufVpA1WY9W

HG38zxe/AJp5dybo/QZZja+xyio6GHnjJbOgehXDqRYiz+ccdhZOB1XNe1K+WKJO/o0UD2bOIpU/50RL

X2/hMBCF3YBe86abRjU/GaoUl/1mlJ5xcfN1JblvE3
QnVIBxHCvS4eh4mzxU16+nPyJhfF2dAbZ2EPZsFGupgcUDzLBqc65zhiCr0Kp1aRLfOttB/vgYE4L98D

d05d3wh3o2gGKlx5TV0b41YAZzlj4D+JVys8sqYYDy0+rXYgBXtt2zQPZvmRaZFC5CoIlCIEs8c6zqog

ak41yXqDt12jKZTyucmcCmy6PbojykCauMog70ktFK
5tp82RcT77IyP+imq6czr5DChhWvYDCU9+HvL/VbvZYw5lmzhZzxqp1Rpw9p8Wz9j9QFW1ysm30CqjGx

6t686sau/gnpJsJ8Qu2j62g7fR3XG44tQ6YXyllgpjRrCYciS9N/SDF4vACWOT7/5bZgbr2sbIZ/gbPc

4BOQBXzW7DDKBUnkER3OtOfeC4VXJZISTO8TuspMxb
qeDwSQ1OMvHzuDqBz0c8W56AEL9QZ+nOYLn0rB36OixLuqdzssPGYBdpHjYSojcBTie/N6sq+cu52T5l

k6+8Jo87l8C04E2W5ubBqrxoz81lr7J/pt1QL1G6k7c+Egyx+SZutug8tV/O8/Ub12nIy0znBlW65v9W

npBm3ADRtDQ/wcFtmsjQ6fGVOvt10tZGqUPA7xcdhM
+4BEuyBPQdgnqK68B6mx0iC4dKqDmmIveKcA7vebDkcRvJkFTdCGwbyt+mddsvvMsywAKF3emU7DaDNH

pt86QUhAM8arKQRVuqdOVcT8vjO+e/OE8xURcqPfIX+AXZIvmJ841pUo4Khsjw4FEqss8SxJ6baqFyxR

07GI/HBt/0xHBIqP3G0+XMz8NcyP6CII9bidt5lrq7
mhKbAbByhQ1WkferYvTv5usAES7rm+U5SiJGnKc4itMBn2GPljBlOTlzgj3GwZOHhqmksF2ojCCjBMbZ

QbNdcBLJxIq2rKDTFrW7e0ChgJ91AZzwnETA0IyW3OUL66fF7v6c65hdpbq+cYKRFtnwWWdHgX1NDqIB

Pf8comkiZpgIWQTmM7ZoSM63S4MROZxstfMuKBnVnj
I3Qgvn4OBPK4MEVu5umKUlauZjkvDe6s3ABqbVMATu655qlrHA9Wm473a8x1snLdYeO//LGtxT21c4mK

8CNlezp2ucuHl88aBsurXWQoVrqw+kdSraMvT0Zh92hFgtaDyuMdds5h1Jcf/k1DeUC1bIbEIVyQWuG0

N48Kj3ONGw1UeEZzTnO4HwYxSqmcoDtiYE4AxUlHm+
Ivgo4ThVDbiSOzeWmtaRpzZBvjKBlx/CcLriXxUcT/2kpGCSixqIB1iddhijfak/799XQp//MwA2zHaQ

VLSTiG8tAE+jHhPZ4F4FbqBQdyjSsmsAnze3EWR+5KiEO+L0KwO/MiCn9oEZA+AC8+DGlo5MQvrAGT+x

UYBrvEskB5c3VxEYUvvltY2g12HxJzU+k7B8PDLxrh
/t6Q2Gqh8DAutj55w1HJ86IbMfitHstkxLNyxVpwd1W/jbJnCqT1e2HkFiefCHzBjJwQQQfkZqjdXXzk

JE9cKG3OFtj4XzR//TM804FVAuBP8FEsRA/4PrKgSS3+0CZmEI2rL7el5Inv9J/bMN7bIcHSJw76vBMe

lXv5I6FWqZCi31BBxhhe6hlNe0VMF4tJqA1MgK+qp7
fazi+li6qkALAZG5hFpgTUVZSKbjyppbnNf77y1Aj2vp7nzFe677B3qH42FGCcDvVWXUiexZhK+NntWh

5XD/ZAmZoETje3ri8TJyMsq/RbVv/4jFzn/wcs9ebc2ZaS9+rDZynSzRHtZaSJGqCHlXoo6xKZieHBW4

g3/gOlZ2EZcthnLLBqeZT7dpv7ZUGRigujKTWlz2P2
BuczM2Vlz9u+uQuo4Ee8SKlkpTIEYC3dy7yRkY9ttaZfNsd7ih2HLiNrsv2wvaqtZdHP9LgDLcC4n+tv

Bv+ki/zqkSaeAB+7v8UMVTEt/geO55CohpZr8uAhwiZam0jlEs4RcJQJWIFw68NupnnzoFLMbl0nGP+Y

uGyTTrowxEeVlUivKzwoD6r0xGXM0F3gd44YiBjJXf
cLzPLWj4+YAMaVwJBmYKd9qd+fo5EdlLV03GexFYBHkRE1A/14rsegQpJG7RHcqqrP7spQ1csicLGCIB

ATiuZr0seVY7bqTNDQQsCbns8FBIFxn4vUZKIczas74fHEtwnzuGoizC6u3JV9j9x4MyHqU5d+T3bji2

12lZ0BJnr3i35IxAUutpoamDz6SJHjmQuPEKTWeYSo
wYscwK3aopwEua9h7xMqE87xrT5QL8iywohUsVpM+aa8wRUlJw96uQh3hWpvYNnf5rwtBjam+8RCeNWe

wQZf+YuPHwlB6UEL5LX/iCcFwX+BKFva7gF7zUeVqdZr2RHtcAaja1HygyHu/ocG34bEYwwdyVLpqlEk

ZzFjcEiLKo3ccWeQsMl1tEj7xohxyt9b1UmrJVmJTW
vRFQj4YrDIiXjPGj3mi0BE3sO73p+q7MSw5ypP0aswN1GIJQXkB+f5Q8H56Cg1gGSvqqrElpqHeekMf8

e+3VdrV4uxWgR+7qjPPDeZtTY/KJYxJ5BU2km0RFiGsJJJXM80NW9rjBW5MObinnOeUX8KDPr9z6TT92

b937SxXPoNkb5LCTHFP8jrekQ6KNe55tnFi/s0DG5x
vnI6uN8AiL3pDE5Rbt5Db/yiUxaySNZn9LFNmFsb/8AVH+SKJR3Cz2bumfemPlTg9RM0epCbCYiiMEq4

4/3K8ozbOJgBK/BYu9mpmPzxTK/0CHSYffiozCY8ZxOf2NBnHj2GGuQImlDp4HCu6SnWpvzhNlM+syJo

d9QbTD3MHvQMgU19I36WlW5ULpgsVMQVhIZnC/iUiE
vG/JZ6WwGZQVBnwEQ27qosPFFlJO1A9Q7UTvaiCP+3KR3n6+sb/J5eHgS6518rSBZcm1eUhQtHEiGnnC

JH64u7nfv7sq+X6jDfC6IJpA5sOwIPW7O/LQPpbywBCeuEiN945XF+1qFDXyKxNa9nkyt5/3M0SJ10iH

XtLKrzoJTCC5YM1Sf4OGI/11J7ysOoeHRGaTupm2fi
scGgl3ajqejDQWK+KsLEgu8I0XiC/qxmjtaB4B7e+sKpkei5ayD6Jnu85O2090Z4TE7nb08dvtTmON5d

sQyblzxiDOK2EuNEtV4qbut71V8edCcqPhoKfpxvMXGrnmvtueowj11SxLG7rYFG61eZCP6OqF95uiwW

GRC+KJtFn07l7+43N4EFUImLGgfhzuTD10yZORWFua
rdersPrsUqvgNF8BcEmo4DCvtbrDNfQXQywoCFIYYnHRoeiJ+/Nyi7HUfdTJd9oXjG7AL2gbJu4hIpkl

0Yzi209qNZZP8PU7y0vCLP6exhFWyoUVPLFrna5DyhwAy423WbHhIra7/Vdw2lK9o8lDyxmXMnqvmVA0

UxlR+GdiJQKMcWXJQYTdOh6KnYEcgt+aN87OpcWdiJ
1cI41m4OcJzPDeCAFcGSfmtqIJw0Kfd28KyTJTz6gTeYkogrU+LbejYUr1Lpzs0d/mp5/f0II/t+MDaO

rYRUniJnyiDURP43s44p212uwv56K4RBNKRsn0qZdf50VjvUGtmAH+AF6r/najFb870Pq3rgsPW/85cm

krdZMCDy/tyPizIZ5y7JLnd10rHXefHMTVQdweSSX2
Qlan27zeZMCG81iWtiycMRev/J+FL72Imoqc7kB0P4vh/l4FjkGNLb3EryP4nf34c6lxaIAts6x/PX6L

T1xe93hmrXMXigJNNmUX6rAwjqbB4UjIOzUnOmp6aQ8TCr5yORmBG2VG0s6GsRHCAF4o8OQ2Tz+IST8G

dsP/xWFT2jN9S8+g0lGD6IuFEUu2g/5DyevIm6DXqq
KvRu3wmpJu3N4mTEXzAxzSpwK8VmfobL2xP36mJccLCG2hUIJrAtR8sVi2/4JwNtHTAPDJwL0yLEP6Uy

s0wCjMq0D5UkZEiYW0ZBaba7doniynmhrqTK+AesZ1N1t694JwKMF8Gmpd6CYULlGJCvMeFWh+aJg/3b

GybcHCbcfQanbrwFg33xkFlXvBr15g5vMLr2Fb/KhW
UN2hY+FLULWYnlZf8U6FHOAaTpgD18wtrplCUx2W4qwniklQ0XVGPsfxmzvRNXA9rc0z+6MeykzsiSK2

7CgO0UYxME5zDCVap/ooForX1mTDK0pzoEW3kokErjp74zwfKImKZxG9XqbTklj7k4WP2RGqAXCeyenP

f4iuYK2/d/46iHYHf+TpS+4lOYlFkpfb2GXcl9Jthf
0RsL6h3/pQoggJ88JSNtmAP37q01H4u/z3Zbl8eKnqJeADdr3ffNqLu4mD0fEbXVjAZVvzF6eIAf/btj

9lL/1SpFpwvmcbSKwwTH+rc/46uM5f3cD7fJ2QDpkXxaBhL4ljsqa5zUGzUSYxBh78pSJm/GBqswKXxX

p1YOSui5OvEvSwa53Z3AGLiUTmIo/qQzqT0avP/lZi
jjH6L38V+x8B7jlh9aOxH4mu2g2exXTtqTjZJ4qDaic5fGRDD1eD8Zu07JAXqs3dyuA4eKvm1+Uqu03s

B0v3N9IbShU6/JuD8EfM4Rmh3aaM80DidvvrxF8Ut14bG/tXbfWdD37nfqUWq/wlptzT4yz9i0hCJO0Q

Q/OPwLn/ypTzb/ar/NhF4aRn5wsVhrrX6+iKtIa01O
jCf4pBff/nejzIBlyY5OMO1+Jj33lV/jNoP9CsXxeNlkfiik7nCBqt5br/BT0kfs3LNQCbeSnmjrLwKK

0uYqkFnlpHeqtBiEN1YE+eX3TF+hBvK77XTJmgkZq8pSo1CewyYuNO9Gk7nxptRpfBsuZZozsnbtEYEJ

sK1XOWAIhvp3KJd2gv05sssP0DlRBF6PU0BL7ODsze
/tlZIhxY4Uxrn6VpFycG015tbpkPUm3IFxms6paPrM8c2pgproIHyLtT8duWZ8JA149lC+NCaqRp0xQb

+7iuTh0PH4a9L0blydZHOn5B+YCPH8qbjnL3YZ13wXJbCJng3T/7faOj6WxODUfXW6eUNJV5/2XjiFUv

ozxbm8hktDwi21g/ykacp0Pj1O98fjHdOuQs25hnei
7yxfa8pHNBoLbDb4fCRHX/YDnn20uIJltUi3GItABBsoris+l8IpGnzd1BEDaMtcIOiOa3I04wjGMM8s

sQwU1qctxzEdmX5l1ODv4tEiKeLxcgk8JReZCiF4ta92i/yC3rCTbuWkQXxDC64grrMkCSThFs/Vi0xc

+mAIPVHcok6r/7FSIBwyOen3/XeDj799sJvco2M7V7
KxxIEYCQiMWR4N7eF80uIkuBYPm4csSxYLY1gYpaRrsk6FR5fvbpwusH61582AT1eync/Oc33GmsjVAK

Mved+77t3Q3zSzxfLY73XUXopH+BfIi4fr/I1tYlJiupfsvOJrss3pf4RDS9iLcxoTBnZo2Zn/Y3MwJu

tWw2JsSd39f8M2/k/5nN9PNQMslmq9ExwlXL1p9eEN
O0+3FeJ6xK6xo8+PgYV1XJxotPHEaDENlaAfV+ezUeh2azOK3sjisk3JDm/uY1yC/kT7oHL8HMjMUE/e

78/+ujU6E8kI0c8GSjxGhmtFxPgcXzG/9VOn9Y5qiAOKvQZvV5l5DPE/XX+Rsy9PR+Y9NjfOvO38dcCN

74DdXssv1PNU2uy1CRGeHEbyv9qEzGKSAtiCjkzHSh
jbsIZq3Wz4i+d9kgteCHqzL1TeT/+BR4PUpMdjzV5dvE+5q6urcgxWk1iT2+MAJ7a6IJaUlRklCep63B

v2ElBi+2ls8jOc7ELNYADg1cKUInMHPHUYrbAmWVlCMioIcTdL5O6GTwjTej4lYT86CIXREpD8bWJ4cm

AU9kmq2VpVkIl1/2OSpH1a1z3XaRsvMlrQCJ46S3uG
UKdFFqt7MJf6SAiBHGPUYN/OqsjSqGi1QVMKG45GEkCeeO/xP8Lco2ZVyawNt4XsfLgY1zqjcokIq6LC

xUhxkxswxPejA7Sj8bsaQeye83visP0Es6ooYQkH/n0fRbAwPPdKgPjK76zRsxCFrX6cIJkoI63+8sEY

MPq9m6uZut2mqmKpLR5EBea4sH7qfRH3rhIq/YdfVO
qgGxBJjKeX//sgztc39GCZ+uKoKVvLknEoM6sISvqHBcMjhkVz6g4EPyrVO4E6WtGy0yu3J/9SV9oCcf

gCu9MkQCE9qVWR0K0chA/98i37tJx3ruYA7k+eCRfhfEyG2N4hakOJvHwpClErMayYNdI4CepKuwLLrY

Y9lYD4v6/WJfCz7di56GoEmO1ejXEQy3x9Dcidv+W8
o0tUJzIyceM7yZ0/3x6JAQ0X13uiNkYAx0KqU46oZDIx/AjLXAFHa3V+NwBXQ+2ww1lQSr5meB8KTuSj

/IlgJ1HJlCBF5AV7rE4ZXI+LXnoJcjonnwUqFXMEGhWeZsElqtxJfJOtC3wflsOs354uUGo4BuDtdW+y

678RciBlH377aR7f7kEjpbM8ZbZkh05rBCwzdVhqdc
2Hb+p2wyIS9dW6vnoyBvxx85zriVZ6f5ikfbTD0ULPvW/tKIOx/E2Pwr5bu49+HFgYQcf6RE9+Vs7UK2

isYZ+lotxEMrT9l+RlSKa0ScOup7ExVaWkwW3UflJ/eDmKFihshFLV08fCYr3RvCMILnAWbjSOUGH2kc

ucVUvr2CplJocShhaYhxNyB57kUtwzr8hf0mKqWQw1
Kx0uN6dbi60Ff/UZ+v+8b05p/PgYurGG4hHFLtIDIPMjXIf5zjIsbJiQkIyn3MUfVSVyZDGVzsCfDi9a

F1BLCotexWh3lAbZsdGf8FncW3psOMbwpwsQ9ON1n7nUgsxonUL3u4F6A159KZ6pkagyy/6Wef6QL+6R

illWXjAQkvIpsdYvQHETo7Y719Ylf5VKkJoav3DOi/
sVVCVf7ZUnaC7MpxZWDli7Q1uTpmzQNU/83OcTqDX8bEjSzg+CR4oRa1OB4EICKrOjsXjBhFQby4iqWJ

r3GizLzvUwPSj+1fujeRNMhizL4//l4ENDlTqo/pibjo69G5Crcv3/rooWmizBoZmY1ORoJvGFbTAydl

2omYOx20u9aDM6IoBNraFR+qqIkVlfIMsDmiw8bPGW
iqozXb+hCUrVmQNNMtjHiEmD0jyH5E7PmZ9fzd8/W7BT5c7xJM+3gniAOKuKICfamb2vcL6g47oc0HAf

5dLyvLUtX+OcHm+p0oziceRZvYRvHrfPRYPIoGt4hYW9P1MOFVjO1xa+nNKmzwLE/M8LJVptQliR6Co8

9NlTz1qoR7NuvtismhqD9J2wM1tIHYHjbICzx3t+bG
+o/BqNealAkvtSZPxka0Yzk1VH2vqYfiOp1sS980Dswv31F1Fa7uTmaGuY75R7DHsxyvU2uNyBN08ZDr

bZhzPsvL0eWU6lZvQZ8VFVAMSVMcyk40zMz7szlF3VS+l7lc5DfBSm75IN4ASkdvA5EjijEfJjxMnlVn

kXoUa0TEMRpkItd90ssnHx3EIN7/cOAuYsyQn6ZemU
qtj8c2BULhIfhm8Cd9Go/2evJWa21QblIHDpBdYAwOPVwy1LwTi/JIa4pBv2qUtYLZdbBte6+8Jys/1d

VcTIYFyEetMfDC18EVvxdmVWd3d97/gTNyfwmVOI87MesKhCcl56tkmFLkXMI9dxKutKw/T91o1cGLSA

ydc0ud1i49Kr52c9vwTTWW2ppyv9CxgtJyNB3ka3pj
YFBJfyXe7Ghz0ZThG31g7AMWditQHOPaTy9+WIeIPPE87KOPRjf1lSCq8skwHwicYKCLSR9PrYm689gJ

3mPO1vJ/xP94T/omGL58s9SLv865dZtGYxQZ44JVVa0DkyncWkk4YBGoY8N4LxpKOtqZ3emQd3LSJw4R

OyhCtJ9CMkg0GMCASRBr0M/lS7X6PSew6iv+jAjlka
uwxDsBmgRi5XEB/XNpaIsDZMGJ9GrSEqakuv2Ck9wZeFLSrlCJyQnT/uk3srKiYI77ekdzdVLKrA5GfZ

COdPlpqRpM3XFo+xrOV1t+yAtzY35IzwZsgnrmrDphLVpWOyIkWCFV88OhM0fj7fzp0VmGIjNeu1BY+m

7re8RcCtaS/T8AHnuhnhzaP+Avf0X7ol/HoJ7aFX7V
9VTt+CgT3dU2O5DO8ZVHSmut30QxC7QvWJVK5mIzHyhUv6CvsuPOcjno8lL018gyXdm1wg2yX6AzIrAh

xjG30sxzhzoouzBuuxg4HrGGiwwjQ8Sz71ztfd4Mj4CAhg261vtOlmCb27fjo8wKkK0lP35tDvIgVPwU

z+JdKdFYcap6Z3yw0Jy9U39XQBBmn+vfDBg6lcL9/l
tb3vOPvRRue96t6kCibP1x3/Xxlom0qM416Oo+/IfgOorDuGPqy9/CSrvRo4oxj+DpAQ88maHdfgsICn

nGJQvZFWm8f4oQW5IP+OY+k723W4Pe02tcubQy16ajs1GbeEArd9CEkkJNQj6szNm5+r2/RS+uYoOkCJ

MUcw+TZxkgJdkpafxj6WjD1iYHqVTXYL/ZMl+55lIU
aUjdVtoPBvDra/4rurv91z9SE6WyT029BWnkEIDwjB6zSIEeuFF+NZU6Kxr+97RBbipM89lvcFDzI52Y

KgYcUHnQjjO6QZhzaToFqwhslWTbU6+FuKbJ7T0UlVtU+ZX5amvOCzQ5Fx5BMZ0r1ywDgg1ezZXky1WU

cTEd5ctl9n3eXJOny7CDzeUhyqd70H1asqbnzdmjva
L4o/7OGzjYgihUihsHQqUblzs24FJpstxYhRskO+xd+Bw4LoE6JJD0q1chHKtmOjSn3mVrUkMH+MEtcn

h3ACJ1VV505pP1heYiH3xLKzSIXZUUeJzSlCq7GN6I1FSWRdg3zl0Dby52Ph+MIsnjq52T7/KXQa0C+D

+TzgHxRgUQ0th5z0q2ZAg9XUd5I/EXGe7AUmULyI6f
fIm56Jd0G+ovGEOfFtbRFPiU9xGZ22ZTK7XbfI4Dxqw574KZVDzW/KQot4HtXdmputASu9+ZTgvHkAea

cucaTllzXD8a1imy3xcHrWGcW9eRVaBpKtEXLXb4sP0XTKCIyjIM8blKMtGB4/mz0TWVeSpLiWQeZJD4

Fqld7HQHfizDKt4QEXJ8BaPjRSJ1cwymBt1SMsWg5o
cqbyFpOPDm5rQ4bEruW0IFDuF/ibjx9WTlbwBXmpYKZylfDXIBPyxVBWtmy62Z2GbJsAvJEIVuRmZRqm

t9Nnyq5X/Iu3XSJ956SJmEfEDhUagxzyHBU0db99fup20Stj+bsKad3p/1j3TiZPwZtiVbbv5WTzplGA

2tw++sIhi/uda4v008tApGoRUf3yVd4TjZFP3RFUVR
T4Lx7Q9nU9zWe586ldZCJLU8fj8uf5zyXgvfgyG7Y/HzQbFyMWCeePbt7oRZsT1oOJHAOeB3TwpNgxtl

hW3Rnq0leXV/HydM3Acvn4LY3PODuY9aSEImEpHK8F8B0xXMXGaIegBqcQNr8QxdNv2/sGeFwed1iL6C

S1AqbqX+qX34QmCYkGAHxIBs8uQNAVyKAse04i9xQY
+GG/9EJam/C37Ns0jeaLlvZdtYBdrEEVApA2DZYq9qkuGuakehrSdnuYRkVASIOiUEmvFLp1cR4TYyCl

2kZ67ojO1olqdb8Eez6CkmZJHn/nzrXpaPBFVcF5oBpFEhJl21f9xoQLUNSmW9HV3eUyLPU9Pb2ClT1I

xedl2Zr/0HlVuJTK3l9aev8/qfms8IULXCYWeRde0Q
HQUo3K3WJrWC/ZnlSYkgZAsfyUXJ8wAMU8dh+gYvdCD+wMB3+AtziYIaP+XCKFaEFKZg3t+bpzjx2Wxr

WobjN7A8aUKAItZRoPoQzJ9Ss/YrNPZxEAel1b6EpM6a+2cz+PjS6pNGsmWWaNEogY1jBdBxikoL3h8i

LCvCwx7eAqZoGjiSzPHJ3Iec4/Bbl2Tv6rTEtcYuMQ
0k7Q80mRUiT7kAmoveDb6W6LWcpz+P2HwRXBGMs5lO82nqVLlvXgKGIPrFEbAibtc/jzdfJ6xxs6cjOT

GhIMaTnreH8zHE2Dc2TyN4Cq9WhCRm4ud/e/0TDmPl+qZtHmr0fg8HSGxFZk7beNFuI9TW3P+uL7qMK/

pvL7G5NJ0GdxGVjQVphC9EzJWWGCzZJtKT00NRZ1Bw
0fb4Qkc+XZ+YkyTS+jGePrqTrBlaun4aDy9Yn9dHjCF5of1hkdOz1Z5+/9mBk221kQDnuYGalaoJuR3b

qH9MTWUte9GbpA7olRt3OI9VWOweGZmg/MdPpOZiEHjdu6qUmLGqGIdXz1XDJGi5nJH3AQtVFcm1Kz/S

a9kQMeJGTSydECihO7rQDgef9GrOhtd8y6mzDmIz+D
OGDEGSAqPkR9+qlC1iRRKtcFwHet+l1s1LyAtZjsGAW2pS6Zj6r9T0kiLrkSYOXejSI2V+G24lNzT90C

+9ThHcHFTAKB+l5oPhkjBbk+pfKF0j7PW+R/nfnm46Ydpy3coBs3pSPo5+VWbkRyCL/Cjvka1bEPxrwc

g52h4cw5Kk1FNRLo/TXKtGKhP4HYDqi7FI9nFWfO4c
D7tRKq/lgvFqquMoBXkYEtPpzi429mGKOhTWCsii7Le2QDB+jhJU103Rx7/NLA1mEawMyXNC5+6TrFah

/xSIxc4JhK7IVoskWhkGVusKxNaSBIEUXuiU6q+Wcs/oguN5uqOs6Yg1OLsoSZ7MTrKXBvXy0sEfYUkS

KiJMgn/uEGu61VXtWJnIQiZpsItXwzw3h4nYtb7yf/
Zq0W9eRzG9fmwUDY6y9nSn7M9QYljdttImm7UoEPzi86+HvfKxvaYCMLL6vHAgJSeiqI4mzmKR86nNW9

PgQaQ5cuQF8BxewrIHOef+QkH3ODESYBoqZEvO6jRmiCfl95UXKSOsL2ytKJjeYeB49TwHXqpXTnAoko

66l2TU/gIv2kCuz8Kp00KxN4HxXuizWQFXYJ8T0A5y
nADaBQP/EF6G9PGy8ZnTwk0HuAuGbXiuMr1G2dlSSZDluWZgULmFjU68izPeEPQvN3VXqz0sEznxh7CU

PbGpgxLv+Q2XFi61iARUrn/46yHX3UagDPUIueYDtnAAP1xAlSNtdwfofPjuvk++XH0yZ/13KHyzdK3x

5W86FqSE1rVKvxv9ZqGlYxcCKvDSAc9sK5vk3SGjm3
2iYJ4sW/69nPF/i8Kad0JDjexudzjq23dJWCStB5Pvt1m1oK/e795X6gk8CH/5jkVAfhyc+E5NGyKJjv

6P0aXnAUoOr61LSh6zzefZDqoFfS/fJciXCcdKLUmOuCS2Sm00gLP2+yfcreC0CLuFzt9iwN31GWUiTw

pIs1tp0UG4bKtEP7NjJwMUiw2G52TAjXuLE3Qzlbwp
a2wFgHofkIkHCcvpME0njmTfb4W/JAQ4AU5Ji6QpxFR53uvUNAQJ2pLots57fi/VhhKYNznkpL0shpxK

+BonryryCiVt5TYg9Bp4pEJdPIP3/KCm/BYuS4zkXslap3jT7/AS+AjACYVlhKqLpd2ob23tXEaauJMY

/Rnd2GZ8r13UU6d9n8CMrTNxAazrFYptz+iY9QjaMa
ryx440TroJSiO8XqUvOUV6an6hfeHZiOqpba+lmYGBda9u3pf10n5sQSQCMdvwRrl6fY2nM7vWpaNdXu

2JcXzTejzNoFa3XwZtr9jniqmhtlZdkjv/EZZTV37xHsjjjYZDyg+3wmS76iHBkNRnhFD2Ebhu1f3Ukr

BKWT1Fvakl+Bk1NUfoMSe5VcH4T9/VVmBT9Cc1lUGq
Fb3+8qHjOpkTQpXSLI+a0CjSa6Evqvz0gcHgBClD5LjqWfm543vc6BT/tLOPMbklGAco5RmHezgPM7+V

GxmnUi4QgS9yCs0VCmgWQ1B+bM3OOoAFk0E03GzEoKtASYgzdxHCiIi+k8I1gJ7W8YT4O0k86WV4iQHw

r1GnC1OiAa1rEzmfLKlHr770XEIQ/H+XY7A7SYPs37
7RfUv41nFr62pDWSU9esozhjh3oX3CNaWvPTwQw1Jm95g+Nmoopr91hICDexxJSidYe12ZwCJyhCjDyP

2uIEJNawZHPCmPyTFWy6t0qqapQVAEs3T1tk2YnO5tken2slvHPxYbWKo5qz5ETdd+57MlqM2XjAkxHS

SWDl2l1nZwPMdGbz6U3/TwQUdJM7vDJs7yKNesatqL
K9KlVqqziYlAJ2XkEedsuyD9a6ogpZmv5fp3x/Q8IcaBxawMcyzJD2Tt61T1nSQFjkE3V4yRje+SXUAf

gP/jZKoy4wSiTgaMPzOYkPK0FQySmxiQ7+fRpXoFFXEzqQZjXjlRLQb5hB4xp4S8pg9sXaUbdQrY57Du

fxpUnnoYuuOGmgH3USmFhTK9aQZa+Fsx19bm+m8/5A
DT7mvwibznMFsBqgxzZDVlXM5+cj/UuUMEiOwzCX3XpfkQ3anugyVRxxQ2xEvgQgqdUkkyKocAQIpplr

wsf3az7eUyh0gXQx+e+uCPBEXQv25ymSFmzTJ7OQXz3WGiR+n28UMWrGy1ZcpmLf1EuNqzM85M+vvxPe

2qzJMUX3PRemlp+Ong5mCpf8fntWVwgrPo9BitM8o1
bZU+lNbbzYYhr+pCnRVNTLXdLSxGViJHxI5/WDobAxmvWw0yaNDmdT1mYDS874k3A04X1/P6VsCw6fN4

l0GbEezXqpx6RkDFGYb1g20ATa+d5SX6Xdcev87A40D/8x9FDGCTfg/sQxNVk5yoPv+QBFKEjy+pI29a

/HydF/QmslF7ui1tOclDm0lx6bsjO+A32ZYkgHL+JU
hRvkcUao2IsYQ26zcssa/Jua2nk8uo7SyzCG4gDNqWARKFxOsA4k/3wHCD3KgzwY/PJGv0vSXmusds3n

jW9cMubRiDw6McGbAg1efwbhx6yLlHoPTrES6pzbyQ2ZTUoay2RhJt/+FgboPKZsc2RGSDDr5cvfCJS7

okk+NxoyYcTIpfhppraegN7KirNlsgs5VAMoQvgfel
e+47GzhSas+S4dTI3B+qZa4RuIYR08Z3/+c1AEChY0NiEUGGe3ABNFpsSScMxftmGQRz7L9GdV2zuEFz

cq+ZfUFB0riz6y+VL1jYo9gNRGcl1WsTFGd81czybe/q6SI/RErVHjLBUeQhvyG/nxq/cfnwT9z0sQGH

8TURqyxtZJ+BidmzFEEadaUQk/R2aMJd08Kgrb3n0O
jv/2LdyiESnv7pISnh48Mc4hiNbppWkGuAgkZPJfWWxdOn3XsoN6niYiKwm9hp33QgBiVjL7JpPBL9s6

b2zI5Ggc6xT6KF8wIpWaqz8poQnff+VddrKZ5KWbU+4drUT+xkiBuyhAEJnU0lhN/r/EYUFDxSYp1baf

AvysRj2M48v+rYDpVFsdariU/QrQkwDQiSb7GRpXcF
3J9zaRXD0clfoUnxE/p5k/PHXxGP8LL+H68y/iowIXDNx+BK65Oh51qYk4mTS5QFqgEtA+SdE71tKXUq

dZabfWobZh6B4cM4RrQgh1vY1MOfRPxgTCXTX78Vivam8I/U+4DtjMfyGV+P5ytWvaE18hSrcQw4zzja

jqI6UsrtMipBO7hCYUHg8J8DSX0pyuU/DhvlDRY21z
S5KL7DGi3oN6xr9TvJ5IMbZIK3WRq3XjqBKZ2SN5sbQklRlx2dxivA48YdZqMZANLVOOBP/mkvnnPD25

AnXXoRW38ZqdfAnug871B4Xvnct6P8e/CI6ko1RXeieTU4o5PhcMrlbWa/C+gP2zyo6au1A6vap+dHat

BVulyvTnVNR+KNNakmZRdUhcg0QMAkdtAcJlGLRgHo
DeIZC/CwJ4Utnj5hdOmk/VtiMBD8YgC2D7aqVwKAlA21MoyqqM0/rcATo0mCNjiQx4vIJp7kgXxHKOnm

UQQM+e1WuMy9kreQsMENoZkLvX0xF7G4Lcc1oLf6LPhkEixJBaE/Zk6hLeJll4yh4tGGlMiApxtMbkU0

dp0U5AoRKJBuqwmwrxESU5R96A5XejDR+qk5JWVDPn
DliufhDYuXvg8zf+De3YCYhkwDSZ6r4j2BCzhCpxxvcF8OOIl+kVx7k41OllxAj3JvruYi6yirL0jX9V

FRhe4lkhFWxkp+gW9rw+YYNpKApOTzwl5CGnRarj/8ODKzaAi36KB2Tf6MfxbHu8dnHgI96VOI3L4S6z

GpW6wsBLgUip8qWwkajLnlr89aQhvoU4HreGFlPp4c
3QSaVKWFDEFl22rKUPY9vrlipM67GWF9regzkAqRi1txMRCA9oAI+uLlSVB/idiPldB51+zPThkGaPyI

pigD6Ff6zH6Pd8cWjFFv3TE4S6EqRc/46vqGuPdyNnGq92ocZQKjlsCWK+yX5zdjce4/w6tebuH2mDK2

eWjI/0z6DGIh0LKNoqsh5igSs39eQ1c9TRvHxf+kKL
ERfGdQUCIakX1KZnuM7AlmNwPHxDT9z5Vu5ADbD0MRrt7XJkJ8uZvMHTeOsDsSxAtMfboWT22nedb67p

MrJCQ7tuUJwbnqYOeUZp3RzLZfU3avvMBs/V5UvjGrkTD63gdyKfD3BOh5L/5WvYjJ7POvdgKpxvHRlG

+uyVlzkzhOFOoek6gGlGiV2fw+RVllAh59i77EJiVF
G01sb91Hh8ah4wLmjc9UEpyYZuQ7kTcRnxGfB2FbEmqlCcK9gsIkT0G2fK7e/e+/H5sa1UTXPBjZjpie

LR2y4fswkxU4Cyme1K5q/cA3oemnZIyLacEw59ZuL+EN90Ur2QYxhTULrL2msCE1otAcaTUswEJJycWH

BIkPlYeheX1OAYOZb9mKLJeR46Es1IwZlkyNAa2j7p
uMBcyRBv6DqO9ak5qQkaRUkH6pvL/8Zql94T6fQrCJunJxXNqH7/abZC/sntL8A+Oa7r8RoPjvj85Ix+

v69jHuqPROHnT40tLLLqxxDQ8UrXBF2m7XYMJ6Cj/8ms8s+tZeGgabMejLFtUm2mA+JW2QWeI5VMofYd

b11MjAv+d01M02clO9Gnzm5xLw2wP1f8OvaT8Poxiv
nzk1gkFQoeDgLbURtZ7da+oBuimD1PWMf0T8FSqvfve8iaB/YLH4czEpElpE+84wm/0wjX6j97VzKKzs

gRp0Gf/t/KrFJuypASTbrkxSOwsqTQyqQE6U+cQdKUInYzkUPIATvCB/gFxzaSGBjSriWp9BPk8OKLQC

meHaYhNBi1DBK3oftJ2REoib1ZRjLZ0gUKo1E9LA51
H+D6KVbOzS8Jha7uUJ++coZpFQTREe+HwoSnN+IWAXAjuVykR9anwVwwGGizkNZQf2aU383pjPQu/7Hj

9P/46TCzNS2EXDJSinXvaXYuO4K3VQwx6W/pFv/6HilwWW/rmwg4nOASv9Ta81IBC+rdzIozHRMF8++F

8SdeRW3hucX9mV+jPjTQLccVx0ccCp1fikxDlslWkC
8sj5nMGiDbtLwBlRZT87uR0UmKZW81dACw76FcRkDzd+bOrLy/KDyfnmynJhNvagKieWUUjGfvlqc9c0

f3nmUUcy/h3JAAuBKGsvGGfnpid1W0OeR6HxbARPxnVvJ4hUaex0mnMl3RQYI6dJWv7iZYkkW6cgg1dl

nrXrtsWXhgtRxkq+tQk4msjrwi7bemMteO54frMVoW
yjgE6zaJcgcyqVrDPKGKXcB5nQ36JlO+t1OMdArFdxuJJ05+KawxSdsKnKY2NPtrgm54WTk1evgmY4DW

GBNSI48g/Ln+cdkticAIk5enr90TNKGwDdBbiYuox4m3AZd7Gbt5Cy88hcS4Xh8xBFgg7Osomao8HjxE

FvnOgaCQswadxvO/nukunxapiyGWmhob84TIXdB3hg
LAoOYj+salh8acGrnE/mAZvVqHosnMq0IHpwFk4133Imp9bNoIR1ysFNR2zn7kWJsA9jE25RNi+IMZRk

XyAKoexvmxZmd+MHagagFK5g4spO2pnKLhNtDZAffqpgYIXe0f+AJ85dTCvD+5xsrpf1sYg0xtE7yh5M

B1x91Og8yOWgTO/o58OcF+rfe2FoTa4XIzxuVFZhCl
u5psc79ElLraxLQu+VIET/4OvcqHlYq9oJAwjGoieb63KPlpcICKxxu39gd/NQ//1luW/+9It3Hjm6mgd4

9tZRjJyuRlrkMxAmcLgv6MqiZpNfDilcnG/yeZ3/09xNrjm/ksjOqEwEW7tbwS3xJ/K1ikq5n7gfGcj5

nX9TLOcnbmV7RUZwQigzZf7nEkscwacLjLkuCtdNZA
onl+C0Flb6xXcPalLOl73DGjnPRoc+iuUt1IhCWhTI938924wKcfzEVL0iQ/gdZFTUlO/QUuThZ8R9fH

G2+L+48uoKyLmsVQOSnXW9bY+Yg6Lp8BadFmcxBOvH7u+za/9RV3TcYDWsubPlBg4k9zp+h5/7fPhUNT

uew7NmT2fXmcInyBFktOTvaoB59I871isi/8WOSwg1
hzojJ5NkIOlaUgIXCgzxuCCmldzBdfd/1kRsl13+v0CbnwzeZ+fljKxaZG5eCNyiylfUZqvRc2jMqaMT

ZXac7N+jHM0M2uwVpfjHTRnjk1pZ4ds5hZfbMDN+OLqN4cDNFYzRG5mv6z75pANblPwgeFea+r8j+HD9

zeQ5TolIrJrtqphUvuALzqzlfjO9wQqgxQf5JDTvUO
tZgT5xDfdfJQvfYDobBG12yFgnHnV3nzB0/UZ0nDYeHDyK/w+T763n8eEsv4SID8o3O2jXwth8GC2ml0

1yYl8LCW/Ky1UlQcHG9cx7ylq/eHoQZZaFoIq8To5PqMprJj1GL6DY3Z5jYG7Pae2OlfYaL7717BHgzb

P+24tF6FgZBAS/9nMlkOyBkmuttca3etZlpXrrjekQ
aX+lIqf06vs5XYcPUF4QQoqU/4q4meRTD6wc6z8ZDZsjHRTsg5tN13DI0Vm3zV9D8VG6YXudKzeyiPER

56gAlMJS8AnKy19zdcD2gUYogoxAfkNSo8BBSjxg8/3rpqWxpI7XeK9V1QsuolvqAtFqYl7O/JpM73Fp

y+/hPa4o4w3LkTRqtF8VpVrxH70tbWeLmo/T5MbWzA
EG3EYOLiCIRSATb9iRFVB8GExiqUHNuD1qUoij7/omL2yEprwbq5m+q7X1bmwPi0Nqzmv5mSOwKQH5q5

+ejUXgy0bHft3iooNfuX5Sazc7oZeOuA/uqzqMmuyZ1u55HSMC+QDeVM6xv4TpYzuzngVjO9M+55G4qu

vc+qFrbHE7hKCyDQ76yZ1R0A6DNoArr2sIEPMyJ/YE
lsYr4ZLP2/P2d3BldgGz8FYeAPRQBXsF5fGYhug4o8muv9Nwt9pSc3jVvQ74TA+Xsyb+XpVxm/OZ3F8S

yacrf8K0VWSnm7HElS5CI2OcQMGFscQNnXaCdVzLEuyPJNpjuTxAnJ2547M2OyaoL+sdmEoHjrTqTDX2

4iKmBI1b1VpWYCHqvyDi2/mvkE9J1DAHcfzqhzRcOB
zAOaOZp3d1Jhax6+wDEIorUls1uobsVe/cuwSkv2bxoD2A2QPM1dbgmuj4seUJw58/vD1GYEJnHa7/h2

iZfdQAMQrZAqHcSPVoMifyt2Zjkw18YkueClc1Wx8f4VL8P3Ph7BHp0b/AHoQODb71fYFzJ3OuvHU5Hl

ChQff3w9t5/yTozZhEnCIfibsfGSBNYZXTiAEJcYPN
diFxcr9ROha5aUBdpzxtKAHTr8oabmDht5ii5NyvGLPpp5V/jHFAeqL2sViXoPuX/Z/wS4cAVZseWs5m

bUPm5OBcSELE/g3g6y+A7nNoiRtwrLtSXRpWekYxSt6L1ehlPjaizYI3GwdNrnVSknteC7st4xFzXGc4

ppNqV6z1stTcGpal13fO0jnRFCOFg5/1w5NBRTuDHy
7j4mdLTiypU95A5U+xVcwqX2i2N0D43hzsqNtNpT86Cfd4vx82948EekqNfCTNNEnuHMqvyblCzlAIwp

I858kfMAxyO9ldbjKrIVzHQMPY7LxvTbVall9WHDxjM4GlhND/7+xmH5t6kRdMldJJ5pmqOHCekPdIXa

lqlaz02Dt2Yf8HdbN8jXe4cPnQgUHXH10Jpd7FIYhd
rBC6Lq1KmqgemW/0ohdQE61c54P4GVtD1bTS40RiUhdvk2sUG2eg/RCylNt8Fia5oXGHNc4LHMuj1tHz

NHUJlGbQDUISIzosaiMOqEAqtyDC9UfPZhfmKuZW5FTJ8OWVr2eQGUmOcpcErKjBCO35gypTTfEg0SDE

JTZro/Nn7C+/v1LV+6+mQSQx+q3FnN38m+nIvVGfK8
Bkdi+kwTAqCm2vu76sMdxFrNsbIBV22UYC3COiN0ZxvZ0eK0S6Qh9kEGKXC5NQgrmrkVd7dgUOW3wjlo

av356c/KkmYdbObjQzm427WeME8ujvyC1io0/R/QZIlCnxbfzhi7xI7ZUHq6E4l554E2zpfYVWM3kvWD

M6I50mckJ892r9Rrv3l7HmYI/WP3AifvXPjzsAUtrL
+9eoG7AFdph4hA1fxzVkoO8gzu71Z/m4PG1naOvzuS8lfFJiM/GqPuijBXqmxE767grGod5c4fDsP/QV

/dVcx3R83sZFjY5VJbf36gnj0U1UemvmGIKj737T9a5MmvqwdQHY8Vem96bM7OWyA3L7/9QOkuLLajqj

vBAL1H7a6hmxG89Va+6eoXakkD5WpcybD6jm8Q1STT
7LigE5ildKx9Ufwg0z5nC64kFEAEiEZAuWol6EcE++pAAeV7XBv3IhaA4A6WUo8mjSY1Inpc5AAFs5r+

Z/cu0jwYI8H7AXzzze7s7t9vfmhqsQOvZWRPOjmqLnwv07GrxkiUnSeAT9kgLdEahdGt3dZiB88YaqgC

x2VnHDjqfw4PTcPuzU7/vpf8rt7pyYTelOS4plomwi
uVdmHezu6QT45HKdDxr5TEf3dNR7iV7SXBiHEvoVE/O+2K/MWdkkVnxN0/C8ZwmmW/bxK9NE7kFs7ZiJ

1l/0yMMOVjece4Xph9Dctht+l0+qgQQCWyI8NxuU6ur3YCMjVS5HXaeel+8K5HdNSAsKqIjhxQk1aH7t

pxlevUu3a/2s2cH+b4sC+m4FHqJtva6Jh9eqrvKt0g
YLsqXMgNYo8zgCKmNxLotcbOv/DI8YsCWfYSpXw7o1e+GVEvOH+bPGsz5Qhq4H2L6umWFkLlaFwAqA4n

ovYzwru6CunEtVaQqYGkr8rFoxq2MWSur2blERL4wE2XbwJ4YozZn7AIVFDiqwt0piAziO+UVmxxAGpj

c2hy+4j7+/uaWPa/3tA46ntIkdA1CIfJAhLENBdqwx
pER/fv+mtf9lxf/OEqhTokmRMnlsEnmO9XHxe8ycSQw+uM2m0b/Hqp1uh8T2xumnt/bjpsAGKXFwxIst

HuX87b4TDZMNmUxsbhduCqkEM2/wQiegzT5pr3rK6Q7Gkrqb+fRau/k6Xyu2PGhw2tnMYpxx4Hl5tEkb

jTDmS3jvRzbWxkBmMeD5lDQKBupE6DDQm+tr5I4Qr+
jNtGbV5UcahSfj7wnDw1yb8Bd+1IGklMDq4Ai19wg1uAz/uk/qCy5GVvd91UMO0FlAs/poRjOqfCx0cm

xos8TAjp+c9/g0nxl3zqfyAjLQ1g6UfKObeZSK57dYLcOUHzHWXYUx1kcFLZ1CJwLFPQJaukbfZBKHG5

Iksa4ozXEvLFb6FqH4ZQa4+R0KHGn7XuG+kQV6Y/9+
6f1xMEAC+3WczmFbzlLV1nXlp4LEmoJhKTjqQDVnVWOZZ6hjVoqGH9OlvPaE4hyOFWxflc1JLilvwhIt

yMF/tpyUessGZxe/1wwpDzSGGZNa8O/7nl3uR/MKDvflgO3YoMw1vCqHmbr9JHwvO3p0GUgBMvsIjC2Z

vS+nq2uEvU/48asL6vC74b2PoA38L/IympqmGICChH
lT2HXzxyt+ltW6wp1jyfkQlvqoa5Nv2TpvwTtHhFENuNBwqNq1O7R9WK4yB9EH/zkA1LlMQ4Lm/T7kO2

2x0+8bl/Vfwv890JRYhKT3uJ98ubDpZd4py+qoKf0V4W/zmdkWKoK8pSPxsfBOlMwsN0zgFeXfDU6GVs

FBJLz3ysCVVCPLHAy/L1PeZRiC8eY5gWYDmNp3be4P
2mutqs2Vu1p1jqDV/9TviKOVpDtY+ec7oplNen03oYgT/CEzjjl5lOet580xMPz7f4w3SJ1XJvzYTFz9

G2OxxP1fZf1alg4I24bcSXQFQBpKoAgO41+XLafmF/sapZW9/qr/2H73TbWBZtc9HIYdodi0pVaNBcIA

PoHhLRRcmHjBeqz6T28f/1g/8a6IHpVApaw5bmpnnS
qdbkq7VDOaz7f4RPITCbEpxBYjbtRbl8tfgDmc2sTTs5yzaFVypFBzgQuQHQXRWOBx8eXLIytja3u+cJ

Bim5mMeCYCy3SlGRSvUqMskgq744BY7KpKld94rLygJ7+FMsMHMjauHVhr+UnPCcen9jRzoBxI/WOSAw

2UuYf0fHmXRZvn+chi/cMPULpCkTTXsznmvdA36VkIv
bhf5mqRmru3MAgXmYm9PfL/GEuTlFrJddztj8BtYhzeyww/+0Pvu/TINaeijB7pJ0Dy4yISwfta2Y7WK

nnSk/+JvFTHNbNzkrOKLM4wCSNeEab8HjEvaY/LO8app21mv6SrmE1gSSm9SK4N6e5/tb8kmWgVvIBof

anR0Qawhz8U+MyDVsUOZ34C06djfoonOgHsoDweweJ
s+14AfF+VjdZmP+wIw36Dzk2JzC56iqKqnEdhQIFCHUi9J/ZbLtKOoGkrjvchmRTcd4FqIHbm1McB05M

VIzYMQ5ELCqhcS2SUTrn2qO/+sCaGQcqiRUe42o+l5pxRQCJ/Cz4v37Ky+m01hIeVyWcZa7q8KEJFv1W

l+6fcJl+ri5EiPfyvVFzhIEbSbIY/BS/rtCZsW99hg
n1RD59J7Fi4KpowJtHJqdpxon0tzZ8itblHNCFZ+NGf4+NKHbghxNzQh+Ib4uqlCVP38K+HELIYmOkla

tVwmaDUNIe7+RKYyyiTbo6TlNlQTU91VzTTBudzXeLkVcF5+dVjXol6YMA+wWhnVqFFdUVTrckaVQu29

IHiNBPFPvBW1vK7c/NGrYqYcEMdWAg9rsAhYdXZYyf
NGtC7iv+W703J/g82e9vFrH+vfGWzRBbi1Bim53XYUwMYVWmC9PfoEmMRvZOVHi9z4o900TeKHSQMkWC

Ig5xBqyfm6s/wLzuo3CRaHuOatTmYel83nkaWme1P24MgNr+4G4GgjILDUhuHCK13yiJdnL4XOh5SJsV

/QhHDlb6VQ9s+TTJ3SSylZKBOB9wJuQ7l7D68KrEXY
sVukyIFdKo9ccFGnVYYB+y331RIxi3tyNxj3S5au2eICNx04edUxNyEWGjy8OraHXGkcYVrkOAQreG5L

//aJfbcyRQX1a58RyqdnxIvhRTprWI02/I/9zmFDLJgJ2rxtVB99l3WHvA79U+toR52qHZcYPiP9GvZc

k3I6Xpd+//qDqdikEB5amWx9czmBJ1051QMvLjABJI
fu4rUse1XT5ZvIYRzL8mZ8yiYAJWoUFZv1KtI22l+kaT3psJaRBj6ej1zHV9lkdonmbSQbKZ0wIKAT+r

X/acmNQQFfHbnfLVJmnthDfXzqhJho3rkZiS8IyJRrbGlASToUGTXKR3m0M9kycbcl7B7rU9cb/zmRV3

k/+v71ZkklbaPjpZxyq6wxYmD3rEgw9KMHDTIhjIUf
ay4o45WPXl/3Ujo+qIXiQJ4fjpETppnHA+bwGJpZqhvLu0C0LmpHYCW6lAZLh5tuuCbp+W3paj1zXBY7

Cvs2Nhpvmly0kFyQLFDJUWqkVVkcsj5i63g2HVnaLWxoLiGgWwJlWZGatQhsHrSjWJTiLbZBLeTuJUsV

zt7/ox6cIoMPuf+LtGTjx8lbeTJKI0mdzTlu4uGJYI
gS5CI9PBCo7yDSkz90CVm7UzNjrLTjgVNGXq4jk85oyHsgDxWozrjoUZ6YO+9+hi44Tv79kz+FSBKFGE

bIGsoDIubtss3tnWI5jOGyWD8cKhEkoKOEB8zTWfBmZ7jms3T9JvStkgJ2N9Qal1TwNPAs5AY5SAiFJ+

T/AFPGAXtdD9ExNBzjfoXTjfaobxcxyvfLcR6QaOhA
TM6pOPkHn9DIwM9dJPDK1pKOhKD3JvzHwCWTFNCU1EB2Dnjzuvz9TGGnMmirM4XLEOoAyLpndvcIpLwM

O6TTH6NyCZ/D3ueOSOTQedRvohUtAfh3T2TEgIdT86bQkgLEAYmKLP9Le/OK/eYe1KLr5wp2mG7qnXfj

phHisf5i2tFcqCGQEOlC4cjzsNlbf5CntRfGd4pML0
rmDvjGFS/FQq9FXRPl09tpYBQNkk70JvZyYwxT7cqX9L82eWFlAoN3CKHcuXBSG3KyTMEcxi/Gr3a3d0

AVP9ceeKssQU0BnLmbkfeaZ0gxJ+GGAdzTfAyIQf1HtggnH2dB+q94sar6n4iAY0OEpDrj25/2teEWLU

ar3wOVoqDK+XzMQFZ1iXwSeFj1iapOJo8auf/mvayE
wrUV0v6ZFBdxXDbhbkfCg0F2/A/ETyzTHIN0EF1wGuIL5nf4XCaKEr8Izw8Z+TCRrvLrHu/y9Apblo4R

s7K4gWVJwrNewxhfqvMdj0flLUMLahYDY3RmscMQD6utjU5CvCjh2npfEXjyW+SzuzZ+AVsDz2Z3Jsvj

GjAUdxA5PqN1wl7H+mbiGCWKDpG309W1pzPo9551bA
egnGvNprCh902idwCZ6s84KbrOob79XLcImYVQ/0NTWQziZr0/W5ntvbDRH+DyzAnj7MtLy833vDoXwC

ZkL6E6IipX2F01UHz1isU1dD2SF1y+5uFk/WIdTGN/VlF3knIATeUDEYXzO1anybJA5foh6034GTHH+c

TnzlpbHtQGDTtF8ZM5/ccUjxgSfbspPQ1MVEFbzCHr
NvOcextiyDSSDe3IzPPH5UGA2NC51ARGiZj1Acuk4/KpDr4ZRkp/xM+GkpflDiQeei1rupa/7LQFpnPC

V1dfmaDkHAUFmUFYfpBcE8AkUhmMmAVPVe8F4W3NjqG2yBEAibHrsOSwus0YQ23P+/wyjYYSRZNZlvVl

hI0Ayp5Plsnj67VgyJbLlpHu309P40Z/mOtDv4aYmJ
im0jXrva6U8++FAwh1waO9uRDL0ZUMCKrYWsv8vu3IgWA5YkXJzv8qof16pMWKBdc5UNn024Tfdm2KGk

2Y5kYV+CykAHtptLE9IWfJuhiWlLTv1Ai7skfjRVLRyHKqAzj0l2H/aWg4gKzmqbqb6Z3I2CQD6IkmON

yEi0xRvrPMAuehsfqVj7nuzls5bN2dBM5cmpL55lz/
PHv/f9Uit0OYOBkBfh+Qnsl3FvXB8zidrvoFexizNmq5lel3cpEGYngtDJQ1yhtpw728KQyLq+j4Axnp

qMYEZ5YeODs3SHLFBFUYX+JwspPSkFYR7+k24rp5rHV6JY9OBEx+JG7FM0t+Giu9MkDqZjys2L2/bHaf

B2yTdP7WqE7CYrZdZJc8tqrDjWYLik0Wlqr/0yM9d0
CRtkAnHvp2eg22piWF2IA4v88StaRsS+coZzstC7juliJ+rC97hl3xEfueSRLnklbbv+eA6OqJqaAb3f

kMc8jNJa8btmrh5r5eazDLp6d8wUnK8bEiwp9s6r+gFpSs+mJgTx1NYMhTO3luz2YaaL1QnGDy6F8GE0

0TjM6YCoVIUJ2vkwF9HHyFPJ+oDS5Q2eevxv4RYfsA
DUtX2FveoMiUMe2gKwy8Mb1CkY9k/k/dfkXh8+/rdulflVsFT8XxKxg/DuejabDlj7US3imd3hQPnDed

ygerxCXO+xosINwQU0sdY4j5NHFJrux+Jxb6KPLPAqDpTz5sRw2jcwGux0e5QPXlX9F+zPUDWPA75rOw

+xv3gfePXGJ5JHQDSwOS9DVXypzU8YAB3rJ5T2AlU6
LELl2YPmgLHUBfccfc+/iI3Zri07/87mkT2C+Hwe2zl0/0tMoptmU0AdzR84/AMMrBQT9rfhQeyf9X8h

b7AOQmpjC5nXY1zUvBllWZJbNUYBFqGBd8Ily3VBjoSol5PmWkKW6e31eLCG76qA4ULUwHcI5hF4WhNW

OL36KoCGzluAJDLxFOpMu8RSp/8dD3/73e4qtl7r69
n8oa6wzVyFYBcryLp573QWYT3Isuz6f6HS6p9B0Ua0MgeVBD5Yp1FE9CSoYr1maJ9BjxM9BiqOC2hF3t

jVN+gRXPS2iVbq7aAHUMUTD7ckFLRXU1t2T0iWhBR5241PAF+KATMB3g9VsSzzZeFOu+3rJWTRbMclar

7f1DfA7NqKjXhGifHt1MTUtM7DabMgkNAqCdkwue/e
6y40/lIGAJjftWcg+Pj3hG81hUtgGGdNwYQOwN/F448zBqaMYYlPlap8MD1TkCKRDA6PSZZBV8afV3uo

K747ypy6l7a4V0E7BsCoQXEs8rgJbn0SQ+6E11n1hHtuRCx20psVg/NWQXayrD5TummFS9z+rfI9PI0j

iRQ3QiFzcbdk7fRtZy96cVP/xNSMi5I4nO8mU8n8kU
Mh0lA47g9dqKtabKcsyfdc9V9NMxqFFQzjmMHVrXCkpoGhswoLLrgA8hSTUT7/DzlqYT9L5yFpjOKr/2

S7B+XsYh4IddNX1PUXpvUZyTPgv1+KbzxeTa5S8jqmExAO8fvppYSkt9Rr3qsLgWOrImidYMHY7AWxcA

TI64g+dUZDH5ojL5VwjKo8v/DaxBPEPmX1lEBYFixl
3M9opugzCkkaJ1eF4Cttr/gfakm99LcTqdtnZl28znJMk6hePgb3Xot9oiZYa6x8jFCOzVHJuVXiD1i4

ak4/hBCKusCvaQ0WUEmKL5ncm89BfGmnURv9OilU1uasqb0HR1cqPjq1FROqgjMFYrbUxXz2qXTvB380

/KJKVV89yb4Nb8joIjdRu9PTWQO7Rdsye5IId7ysY/
Ts75AhUyrC/XVps5Z/C6szcNTvBYsG2ReOgjX6z+CsV5ilez2FKaeW3kDlIur2yp1EQc7OfSlqSwJR2x

b272hzaP/RqveCLF1tTdEwE1MFBVJfyXW1soNvVXrHlTj52V8Q5PoobjnOetYoV2JcTy49Jn293MygC8

fIny5uJosJGbEh1bCKnlI/m8c2sv0Q7gYB4yih4PIK
u6bELFngRv1QS/ixeO9LFElFxr1Nb40QcO1vnLcCEhGmodRi4HGRb7kWxssCdw6orIE0+pHvNgU4HENR

bpR5/wSUT6Il52fAROs8wubdvxT9Scp/6Qnh020LMArAVZgkA0+V+jzLUIPPa9Yc9JyT8M+CrMRiX4KW

cKv+d0oIZug2Sq832MSbkiQE0wterJXe9EbXngxUgu
jIZxxvZ//HqGZ/BEnsw+HysDt23wMixA06nB/QtvzeIww36QeFNmOWdv/BBo1X3SuAc4xB2gbMVX1Ilb

XETYhiSL9mJj24DW8+l3H1eZiG65Ba0eNFD4DhFZKeXRoYoHgMjgKaWxksj+FpbSi0OR3YV5P/2Tb7hC

0AhtAUNTDxun/hq4jQKXllfNUikFG3UtPV0a+hRXmT
UvJePfKeXUFMotiMk6S6BGCEyhWY72MqLI6TA4DhlX8inOggmO8RxP+GZsfR9BaFlj23bzCHuKMXjN4F

Ud4zwlUamfK2q1gZ4zNi2XIUmOaipqKdK4UTTmilnIc38tMoGc0EC14sjMnRyBYGwUiOP9Metap2/twJ

YVig/yuphoc0tNCU/b033Yog+i5pXsnia41xbBrk1E
DSzcky4RahrAt037wsacTblGIQpMA2o0XWZ/FHd4NvRUnTpQ9saANZ2xOA/NnGVxyu1xT6mTI4sPqA/I

ynrTvtWY6lAJM377iBC7Q386gzoaPBytgtN2M+PpEreDC5NDycpDF7zhUQoyV2QM5K8RFecWf4PqHbP7

r2ie90KIYvi1CJjsfM6WTPhor2O8fOA0pHiC1T4vxx
T56M+7Uymm/ovhUwmU8riYLRwFPM/hCNkVrRgTcGw6ecvs9cbmDUgWnGhRcqleTqx3jQk7li2m8K77mE

mpcENpVIP1TkygzMbnOLZzqrk92yldu69T+VkM3CVYKx8W+KsOvsEXWZkCbJYmREYfTrmURO/zYrAnj5

clh58lWNbd/gg/g4/NUXsitJKdsmSYAjR5dJtolzgX
mR8xB/R04o9h0+zDXBUh0EeJ/GOBpM60dZC06MGn/ibw6xWyq/h5lpDD0K9U/1zXQajkUv/Vl98Q5uQG

QO+ZpDZSW38B7eHfaCPXXqsg3+VYW0Uq/77vxMyoOISmE6XssGfsPmI7SvjEMjFO5u16V9a/7S+11kEx

EKmPm0EkjZl3uaQ3c8RsK40wAQRXzd35VpT7IVk1qf
xmKL7Rz2dixN3msqT3eTQA/d0Qsro6BZopugbFwypU60tkpQZM18xT2k89nQ7/BxXEGWJFe2KD/dBxPO

2Z8LshEc3JP8u0JUE/rSpr/Wm6nzRIbVHubq2ebg+FMbpdoIAKkrg73icLLWwiD7Dari4XlmeVqhWwQe

MUsOPhBw4zFN5GCNtNKO0VNWUuCgWYO6JjiP7hB165
H/hDSpQtPj2IS5TWC3iJHTwD0Kb3/tLbp4z0g4N6lt/nzZ4mxKcyi+Noqk+Ta4k4dR6rl91KLfn3IOi8

1ubOtXTer8pSpCd6WLPZqtahHblhG5WlWEKWO2zdEhYNjfVOatc6vj/Lmrr4kWRgWaxdJH29MN36G1iE

OxkdOgMduKqJ6YFlnqAesYQkukZOSFMwKPaaagt1T8
+FgQdXUBFz+oFNgWLj9kaypINoZ0BsItIHoMHuopF2exxWrrPj3si8+cEAP62lKwSC2GGEs+XzreNyBA

/t6uizwelnsrHKRJUWIvPZ4Qr7JrjuPSU97ARgM7R7dzuRZSpGinYx0FdlWmmNmotpRuf47/leuE29mt

bNC0nvMp1SCelCP8Bf/+qGR6uMPggRjdX1J1Bqv+cl
tzHYGHm8k1ALHYDerE/dDckIUjZdr6jh88hYKThU9UIj9buedtO9L4CFOPKjNPQx7gYQKA2gcbf8vbLF

GLVmYyo8D/xYZGKj/e+dLSPHtps0TkxMRDF72cL31qzxowMJTsdoImigTeC6ccXzkgPV3yOVZkTax/Nk

w85g9zCBvEVVyW0/oN6kriBfSWXd/bEMmso0Rmu4cm
MhV5SsOuQI0uUg74E7Ic3q/XdxGeox5LeKU5LARYK4iM7AZjgf2grhNxqFy9gyJTXf8W96EEc2fJqxTx

2RJAyBg9msxkgS8q/iuSxrliI5n7HcQB4S0b+SeuqU/+EyLEJe6w5pBmsDSL+HaHRFQx4+WtnNTMbTEG

ATBdPKMl5iA7NDn3Sx7N3k/t4atn8o7JBQk5x54cBn
olOljo3rEj2otgHaWXHAvekbRCwnaW7fid55L2u1BxkzlerVmKQKv87BDaZTRy0/1hyEDW+20YtA1QoT

lBrCavr7mxjK+QmuKn7bRLu/1cmNYICfbDgt5iIbbryrF6jkwB3VhD45dOcSVRadEnPAVDi+AvxfvlL9

/7f/mpymgxrYveu31FK48omHtvbfdrveGGpi1WKhUl
QT1qpmm2VbaDwZj5Ke+IuF8T9Yc9fPM00ffg2gvlCOxRfDHlDDh4OBzLlb4NXTVApjeUU4W1DMrmHZjv

ZCnnbpDvBoIYrMP/8QNEA728p075clWbEreZ12xPoFvuHTunN4LjpJva7CpeEGKprwWxEePtqHInTeAl

Tl6S0jAfoptIwp6XpkKtt15HmnYCqhG5gmfTaEGz/p
6vTosqmazhfKHsXM868jPFoOWkgltC9fWjDCaUe+q+DjkZf5riDLBoyob5CNfdrW+dNg5ahLoShtsYNA

PnkHqAqckfg+Tl3PkKSv7WhaoZwDgD2o6vHZ7lvwO1wv119Hw2zt/np2yYox+jN73r0Zuxg/p96Fian0

p6uh5S663o4X8UjlYQgMdE/5nA0QU6SeHFwpnMDRai
oKLdMUpMCu9/N6/vDafresdVj32oHqDY7HCWso8R73uc2Orac4GqFCX4+A+xGpetfMAnH3fpxgbrOLni

73/+evpHsBtdcNZ6kWZqa+IiNzOIqF9vJenKRgrtMdHbaqH6V0AN9nBBgLLR90m55tIADJzhrFl7O7h5

MgG+pGUggAtm9fUZeOj8grVGIlVW+0NiNUquyMlVog
9LwmGnOXve0Y41WkdZh/VR83F7+4B8Mbn3DhqyF00FNNYtFyGKiDECuhqLKTVIwSmY+6qKdcK9La76YH

IpbmdJ7An4uX+UF+cI1LK3hUuMgGW0L9Q+ciS9pQs607SxoCMdV0Pr9gWMNbkFY+QSRHLj+/Ph07pIl0

Xpungflev4TofdssmYqa+rGVQ6DjTbw00hofSkGfm9
0srr4LAr8/1tgYlSfljHB1KI6XPVRxZ1NU2bnYLe+e6VzgbfFJF0rkpGqRqMPnzh9bnhxwXwxfYYNAVI

QKj2kKzDtNy7I8QiH2tdOZuNHvbQ+RoSTdfFr7gU6Y3+VlkHRs+T2fK2db6e4Wa9fLQLQkoC5FJXaBiA

6Z1QnG15dr0njw+RGYhdvQQUdD8vHS+HvVkUIvENZN
gl5yN9FgwNSxK7o0h1Vxb2TZGEyg1Tvrte1CluUdpWn3ZOAnZM+erxv6XcjE8QPTl+THgu08dcm77muq

XlwWUGD0axQ07lThB/vPaYwiapPKY4OUozrv/E9Ml5H9LKEcfzsZyoaRay0Jrse8JslefxhP9UnstO9C

qojg+91KdV848wvN096VKGcCDBnDBnUVAsisvjgJzm
yUvpQhg0PTZ+k/WbuDG4CrrJsOGZj4gHLxRNXrLtVcE/qGtLjyw8Izd9oDLXsSVDz5qxslM4LHbf4jWk

egX9bKPXFqbHGWi9OC+rb9gIqLwE+/2Q3xCU29E+9T1j9+p1zpPUQvtawKOLb6+i0EezvJPJH4zxW/rj

Mgq/1djWv7yX+JHr+b7uSLYL8QxAVL75qvss+ws02q
N47L0gswUO/7LtulQuThMq5qh/UjyVobkzGZKddaGS/7IV6uv72451vVGooqq5oBNTAlof9/ADX/PwDq

921I7FsvCyFYbP3BC3KkHZx/tfONePGTdYekE9ofcIoOFqIP4nbBTqJQ6ayrG1hap96Q6Glmn08Kf+CO

V8GhP9LZQ/RfQTb3h2svjXuSxU4H2vVejCBS+eberi
G+arC5TuD5jjMaDUt5QEjTFq0RX+bZoVSny/NBH4ih1ndKe98+lT/XrH4UX6fZ4D8FnfRIfA52HwiA8i

F1Zp8MBVbZHS4/Yf78Go7LTQzlsD47ERe9uY6mB4zSSzfeQsqnrFWETxxBhzJB25gDdvPKORyLDK7Don

c932m9yXyFHaBEvO+yEo3INSElpXFB+R9coOz8h3+G
lRnrA+74cRwvmJsxiX9N4marxiUzb6vjbWnUWz7KWXijh9XO4841apylEF7Qxu3EIAZhL39jTdbqMvsr

+nC0jtVZszPRy1CbNo2g0MVERyfIFbdHi9DNux4ezm79LejofWMJtxCINCdZf0RAViwEiI72ObEzryPi

a6ZHSTbkrEpN0GnZvT/b+7H8jYLx9H/djzI5JN1jTp
zh12IkfmXWM2TELO4YbOcNywwdQI7db8uhLI0+vuli4odImvF2Kf9meI0450eGMWOHu2XBD9Kz+hRUGa

oKsaM3cW3JSO2vYNDSV5hfX+gNTYas42IAMGRxwe7E2J4+LUXntJqYfb+ld7Z0Wz1Jt61cfFjkBIK8F6

lbyFQZ+e/yP/2K/+JHSbVPxuOjZd+XKqEJWQmxtkgE
4LzApuWRmCjqgwySi79xwTUH8sO2erB6Dv3CyP9igClMBbX8pF3oeyYo8iqE362BS02dEXJDq9wD5J2v

GFhERVQ0hWiaCMwOQ9dDiygujDwsWTq9nIASmtL61rHjm/973DrzdjL3kyp8eF99fItD+Rfy95+R7kJM

YZyNg8Yk2rlcB/19b+pYdCNKSGt0qlWbuDx5Y1g0Vn
AvocmT+bJuHM0odCLavCobLCSd7IsrNVQOa9cQVEKUEhhAqROlZ4J4i8xPcXwpN764c0lOhbs6m5Gl1w

ZrOeIqnwGw+3zvdLKfU9K5hxkMdBFLoBeM13xKyqXdyi4sKFrPXKIHHydmV5Mk6bU2yBFI2j7+4JeAYb

9D13ogOn7sqPyeZlq+j3JtcMEE+U513bvjlPeNz/0B
siy6JO252OA9NjuaXfFm6jrMinRd8G6rcwzqc7bEbrUtF0Z2/giILcPwTNd8KWOrc/LuGBOoacxTd7Jd

ViU9nYoQGnkXA8PbJVP7+TgT5lsZO2fnePHiq++F2/3itf5IuY0Ohssi3gRhnBqK1d26BdD1eVEDEmr/

BuMyhqlU3l2a17z7Q0sm2le5gjPfwoQs289bNfHUy+
l+H1D521ARG0e27Xs+iZvOzWVn67X3q9LCm+USuAJhF63bvY2ztrlpJTZ1tcykf4lR+c9e1/ZhQAvFnS

gO0gyREfp6vTRsV60669+0ZyxqkRtkhpx0rno2PUvPfT9xI8MNrodPUi/X8W3cvvV7jhWudzpW/mB0hj

/fVb5tkR5VM94knphZ+5Ge2cmQM0oI1uFrqnglvZKZ
O8dKmDfRjuYvhyWR0Pn9rRBowpnm0MG+MIOuKKoydqX84iZXmUyGj6uvgLSLL+vjy91q3/veW/bf11oB

Odj3V+VwghkwxjDcW3hGSzjNH1BGGNv+JTlPrlY7Cn0tSsDuIxA84K/Vr3/V3OWTB2zM8PD17Z21QaDy

Rrd4vRPLkdcrH87aGJGYYp1DrHxRU2e47VJYNj/RB+
mJV8BY/+JhlHoN3nzrm7vNpivE/+zb/nB3Rd5g5zPU050qkrM9wbk9VMcBfc+t5l+Lk3h1Ew3ZU+Lenny+

o1s90aJmaE92SRh9DNhCjVXT3ZkOUhq7/QQ2NCDC9e6VcaLaCAW/dWryVS+9J6dV+pXvMPFnNNP0peOc

Y73sDUv9VZD6ZaEl583rV8ZvWDpSCnKIWMI4hVPaSX
Vo6Uepa6E77f9Ubwmd/F+xpT43VS/7xHDYS/om9AzDxUiysCFDHGv8GkZo0r7OLjskvE+Tu+X5ZxzyuS

P5zodSmw34o7/yFLoD0MBJErsFknhA3d5Unm/VQRCOWE3Q8chpOP4RA2ceKRv8eKXdPMv/OFoUnBK4Gb

fIxs0kNh/aaSy2Trk0xOuRs/VNF6TpgPN+yFAxJ8uX
udUZLsc/7KXGF++f+CW78BDshu4nQKg9lVo3ZI/3XC2eSKjLUyK++0t2LQyN48DQ9f8ys8zXp/cZQdD4

kSB/YO0pmblCgjb9i8OuR2FlJ+N0+rrMpic3j/IVix6LfWfJK/XqC8E08k0dVfg7q2LcJFwsDquttMTK

x9bhGUY9ivA3VltdCr/AciGe2d99VP01eMMADkr/5F
QSesm9K5O/tTw0/3ZIjOVw7sUfghDxNhcVGC8AWnueWXTAiH18lxWilNLF7nJHjBb3lNd4i5rBazYAuc

Iqsl+CaEBcS3un814Gm8miwiAAqx+l//+B1eJlnsBbqK2pkUYc7rYSDTsNdl8lWTdb6MSXLTAw9XG0S1

h61cQ7ynzC6u9sNAx+8vmE0yeA6m79j9Yb1FBb2T1u
JiQ8qQmFQLZtLhV/nyT/qzqSioZip2x0EBXFOarKfcMXWTu4fLIMdqQXMJgiPCYKTLzVtMmKapEEONGS

+WvHjUuOWxlltUaFIPjiZO5/3rqIgL2zmzKqRpeCQpcBBeCJLC/6/WMbeYpy37Xz8PWsaPtXM8NTrgL/

Js84Xg2KX4DSPIVQIT+3J3sZEIQxGOXmT0SD/9/dfH
yZ1Mhhhc1vZYWW9N8A7ygdoOiO9C7RA2EgK8dZk/rRLx6BKBwYpadjD2+b5C7zvXc9W7lIHjVhp2CSQt

HeiW1aUoFOmH27+N0End3Ftd3leeNtNQPKM5Wj8ZHuNzJEYVRHXHpjskV8azTwafiNz4N74CX0rYNEZQ

ozCqjEi79f9moyOWi/zM/vP7uD+/7qZaNhLMPsF8Vi
iETZ5tZ6FMLXnZ6gsoETcsJKd2t0c+nxz3pL2vx7bA15hWoPi1IMsTBjwc11/41Ldv5X5ijzPJqGBPDi

jcJSPcYEoRyEjJbzHovcA4KSpWs5NTD/VvN7+4IQ3OnkfhHr6BQWqtgbQUFJtIzvi/G/WX1/bfgB9pWN

zM8QOVBpM7zU94CzetfsZU+J7Mk/GgJTgSZ0/lT3u1
we1CgN3rAd2yPVWScaEcu7LEjo7q0hfpMQbAlX8637tjgBt5eCcYkCgV3lJuHx1tdbf0zwoF83EhgSom

t+YjjIAVq+ruW7U7DNXtjiblpHywQFDfnSBaE3vxH+0kjj5AlwhLtsOcNaUpikyeeqBjHdHrn697q7xs

PSyuDFPHF1tMtOSot2K3FMan+mtrEYRQvNJerDW0x9
EkvXKxFF4L6mQqi6VqLwSJl4GvtOaSjHzJRUsqy8/WfMOo37+d5T+NZRdu2vdTDxqKPKC0XRg3/s3L/S

/07Bgxu8gsV+VnGX6wNs00EE9Cw0ykmTXxe0uVMYE/I99cPVQ8ig0B97ozrTsKsPiNQUVXypBF7cDGhk

vvCx6Gf8FOwXmD8jPhIAvzjLSTJq+QvcLiHnP0zmOv
X04VFN22EjimGEyou2lXotbpzzB71v+uFJuRVVuPp+nKkHCEwAPJ9vxjVvQldwJnpLaYW01+86g2WDYa

M/3FtsTU3u6iREHCsoG0DMEDQQ2aoZN+5nPOmy9jLKTE7VbZ38cNosF3LZ3E3ZstwDc6mfiaSva5Hq7m

+aroC74kIG9/6/P0V6t266ESu/U/0aXy09H+MvJFNB
Cv6Q9Ik7m7GmKX/2Aqb6EuFdd3+FunxvRhZuxDK1Y/NRl8iX1ZmlFT7Ww3FW6VDrfiWe6yY0HO7Wb73E

seefbj/xhfhnYrlGnV85maPmln5V2vkEbmt6BfCqONcTbybaafMLhLKRRIKw2vAPWIVeYRJhXL+aT/rH

EL2rvnEC6pWhCg3EMuay2a4T+xcTcO3GVwx2E50HS8
viet+LpYAlAMitpupUUgqh0P9muAwwPWUDUlOfYT3E2jxgc/+Mroeqh5/wx9ndoov2OlEdXqammES/IIVL

YHMlZ+FbJRIa8PtFCn+MvzC53vbfQRjXY43/F324Wol+frbiYcW+2Sh04Fv96LIFpCqOWxN2eRnRjbgd

lJCYoXvuPEDPh/25K/nqwN5LauIkPxaHfUEaexV/3W
uwESbQcPbcQvwxA5cjC2KRWhGSCga2EmofzkpSm+UaIBtBXYETBFuQtNsHYb7Pwa9pd35bT8zsk8P7f8

YmbZdNg5NT34PRY76hEb+0apa0jhMJ9ZMnQobFRzMzEVLv75F9YaE+WJFtrDsd+FpV+w3QjBiN6pVKfj

uY82IEpRadVg0XG+N/YX3ALW1kS/9T7id+BVPPhy02
/yTkLeKCQA4SjgFweK6n1TynJcZWNnajAcFd5w2EkrKJuzAyyJIwSKf81VIvEPj37wEWk9bxoMPoE0qm

k/Q44VgfsGV3jZk/TVO/6zzLWRfUoN+5L0E6oeqvK8D1T+7xFE1jXRPSz6/EImNWTUN6iZwkXv261+qQ

fjhWBPsHWLYAYbS8fjePrVaP2zaKEKJ3N/OXBxmFDg
hOOik9xMXsCxUM5MwKFOUNoh70mImgyU+PeANJA+RMe499sM4umRf2Cf/iU2NZalJ3P+lMNza97i+t5t

nQDvUZ/3bqbxb5qvW0dZtTyTR+IXIb2hpJ4rXa4XQ7EZd/pURd5Z3V1NcD5cxC//ezBglvWokfwibKGd

vahfDmStbpnZmi5BfQxYSqmRuMiYTJdYsca3ncGLlc
nC9vCtbasEpQfC+enAkMWvAQpyh2Vj12XeGPjfGKapj7KcGtqFDE9gRAIn/SBC1YNyJrnUkuSwcphlPC

7DIZK8ewBtyZZbVD8gnazbTltNoNHeKRz9+63mqxyUlH/ZAaMBlbJsXl472Hm+DzjSO1Iy2jTu/aAi5d

a3fTYgPe6PZ+4vIhE8JORYPufkj+lhsnyJjJmsZzTZ
ml2HO8Lcbczcj1uboRR2vbCONW0jslK/UircP4NpzzpHWjUUkFA0QpX1odHqNoHcaSG2V0AFdxhmnkIV

1WtGiqwR+q/Sz/hmWW4nqil/D3X0Y679bhYpSoj3EEGScWx8t9DFVXROPa6991+FP0rPHBnSsdkiqXf1

M5mxmf3wKj+P5mj2tnB+H4QUuh5cwKtsmTLoR8sBZC
al8Kr58Sj4orDxieXd3CHN83RbwW/4Y1lrGuZMqi94Q5rtObtNbElwbCv94bmmYnaJedaPADrZagZ0Yk

U3xjAftD9UKS0M+Qo0MJUQMGHtx10xvZYBeVmG1gTsJEoJvarj36AyrhoZN/DIVtqE/S1llm6aM4b3Wo

9v4/Jg6n5QY8LZYqS1ggNkuHJfDoRWp90HR8QaxrMF
1rUumpt9R4kUVh2ImonFW/Ld2WqhErzdnkXRpj+Dajj55XYRQZoho/kcHwLE5M4nDcMpZTXx6zxbw105

9hz2A0S4v9D44HGYRQ/ci3OliZ2lhWC8DAguudZ2IMu01IWSXVw9u1JXJp0fvELXa0tiTzGIfI29lQSG

DDvOWINZj/tN0BDs3wTEjPUp8ad5nCr+g8v1JA3emY
YUid6ossxmw6XMmP+tV0gOLmO6efxKx5APuBf4nbeJItLPNreZl4AHoE82sojfvCcn9NZK6mZfC5WEZ0

uof+l5qMtYlzTr0YV4Ndh+M/Nd+LlxdtqmoQmHkV1+W+eGHixT6Mx+m6A4UkoifT+wk6pslXlRGqipCO

Gctt9IrtNRyAtMyHGX8FhFJi6ZUmED0T85juI7LhsF
Zu+FvhRzaPGtEWiT8hgKB8a5eRf5W+VMUFvwWv8HFmcKzI5APhTM0jC1/y98YWCmDTGLUxGzrpom1qGc

i8t9P+OpfA/XyhC0dspMd5SbnbhEz1iZeX4Xn6+UpghlOyMxu4D2bEqjkrVODNQZmffwkfFR42fZFa4V

H7OhPYny5gbfTbmJ1scvl+7LkOF9EdTt4pLL+h3IMK
oM/RoL0LBhTWtOxMf/A/y8udobRkOf+/nKDxZmK0KYYHqgn/8PQBMJyR6+yLF/qCwff2iZAsT9L15Hwc

+hgzGyydIG8EM03UASYlWd/7zSiUgsL8zt+77VEfZ2jJbUOyLxn+nlBCQa7YJiG0n0HaRsm22s4XK1Ya

15RuYQ97ct6QcbS35ntHuh8XBHHvoAbtlI2tyWAk7N
mzw5uEf+wcrBFFdgAaW97xtXFIReAISdD4fY4EaPcC5tbqmAMKSsr9l2BORrkI5ZdDApH2dtBuzHhVrs

R3RSD/HKGs7eFbT8aGXYf7ujk4zSriPzxzszzulHst8X6Z3TTj7Tbkdd4DBVXgT1gJ25WgwYGBUFH2PA

evddhFMEHz1988y8r57ZG7NZonCpM8cYIkWDH+zpju
BUMEgncB+FZrka42xMxMQDeOuCCNmxiJZWuNqPaofMqvcoL1ChkLSLiqag8d5Z2PVCXN0F3KOjPML1JX

YIuI/HNdH6N/miEki8qNwjxBq3WyjIiCx9uy+m/Q2MgqEXcQUo2011nZTbc6eLcO7cpx8jKgdEDqSh06

QREzlpIBVT5m+2OLP0CcU7CNXX5DeTaWILZZnwCBdR
AwV5bbzWc5UTPqUVvk0sWNiBFFepAaUW2rd9QlzxzFw+oi6mwnNFSrd8dmOk2YWKtMI8VzDd+lIsh0LX

ZkfiixiuOpEVdrrAXz3hSJdYV9/dYuNr4/H2a7GxAOgHtPGHs4itSg8qRw+ACTQrUjgqxxkP4G83eP1+

kNdQ4DZ6Tfkl36KRA+R5yaYcnos3VZpTCFxX+KzwaS
60a0wwOfLpuBmtLww/hxbJhp6Yb566h2aNPX+67uxBYzm0ItQmrR36nsqulepdNiT+V44YrnTTh8pvBh

VKGi/Pj02gVgD/1g71o3wAdnT8o8VAA3o6tABoYJe8TSTUQMSYn8VF57FNukDuVH1dEZCYie9AT6SpEi

b8uX0J3CpWNx/1SYwi2ecMvS/d+9Hwn508vc8eUzey
Go/E94dTh5Yl/LbmTqdhUhLpCK3qwyYKKq5xyiCT0SwO5bnOM891pSA51to6b6lIplKwVdDpAz505+sj

7Oxc8lDtAlxFkxIKTLlPZyZ7+P958nmk6YCxYLf8u0h2Z19zcfxTl/8gQ74dxoZc6ytOL4a76Bm0qfMS

Ijb0U0mEiPctnmabYgQQ2U6UXUnDBJv6lPzS/ZEtER
Nwd9xO3UVC7GCtbDyGzjkWASDgZsR87GWNazG+KZTNr5amerIdVchA+GNXtpF6Tv9YU+Cum7xoaPaw09

TjYlYzK046wRe3ZdjKmH81GhsifOzr3pGtM2+mGwP4KrO/vhEBJvq5+ghFxmK4HHtA02JVAi7WB07f4a

GG38DOa/YCG4cGIH9qbcbaTzXUbO9gfJdIeYOKDUmK
c30mgdks1DmH/aaO+pWqxNQOfzLUm2+SAp7DcPOa0OqNHNyj43SJDGlX1j6eEzzj1zVn+/nZtgWOyvoP

9gAoO11anrrws4zJ176YigqaN8ZodN1d9gjJSDzCPgwqsGpjZirjC7lRUV1EvLbv7OkA/F02a2ilEyU3

2XaZ50SMemHBOKQNWZh9JjY3Hixj/Rx/VOE7n2vfHh
pbjH0bm2CL6TTVT5EfjrQeSIR8iKz64MUliQU6rN1EUs1TnyoYeNYMiTvh/vj6W9VUk+FmsWEDhieKT9

Uc3wGJNdjN0XZ1ugD8m/hPRNMf5F6Qyc0ymq63NMBcpVsEIjnX9cpowZ4JI1RVYARKeAveoP6JQLf815

pvYZwWvzktcUdwDzrAnC+SSjLDGCLekw6dwDKmqHKg
ODHaPAbS7DvUxWImf3dk9LGnp+kacRQJH1Eye9afPPc9yhXy//EcUYkuy4kYLTdsjhlZRwmMbXhCV7tV

dr3+QLRryPICg2Tc2URNC6ya1af+rQHaw5Hl25Fztk0lK1GpMj9Oy3pLmR60rgG6mG/Lbk52sBu4T2id

xYMUHikmYZRhIgxgZK4Wj+iK9GlhKDJKBYEvdxXjav
5vYbVZCYSfA1Y4TSxU4ZMTCLw9hba73lJeN78hoTRVxAuj8+zm8gs3OMPs+/PAulY4w0d38HNFbwSgqA

Of5e5t7mus2q9yxNptvLo7cOdtHkv2KIs1cUF1WPqT0xoyL4E/+UelqqjP71G/Br8/LLx8VdM3ymVp45

BGe9xFGceVNk8KYizVXsrNx2JkEThBNk/aWoiCrn/J
iLHKh2MB2oPt3i3l9m6AU+3HdPp/6IuW79RDEUk3HvHlkPYF2UTzkGfKQLMfbsnltBYuNqQ8UMu0lIeD

LOpcinFsVtGgAA+vN2pJdMIJOYJMbSfyd8+S5FThgccK8avcR/wE6GCzPtT9gqxO226wlS5G4ouCMsVF

La4z/mNtrGjotSQemLqgmaaRD6MKg4S3ICL2BL23zB
DCdmvRlL6FNNTQbNch1oG8gWzRfWerYTKriE/EIOYbH5bhlRdDT1AEpLD/bPPnun09v8fpcGcxFb7EF7

Q95M+e3j650q7G0OmP5/fgJ8VTSYuexcRJFPZxTUtqdH9UiMiE5K1oYiC6BNizeyl9EiLWUmSV30Z8IZ

DXfSuezzY/5lJ0WWvoF0F4nnVLKjuvYISRmyKcwk8w
v/PR460K6+T0M5f44fzudrrDgo11RZ1LgI1/mF3IXCM1L8ECKuMjsPXkUzbiUkY45PKtOut+4CDUKvlS

Si/F+IlsvCdOHpB47YHPL23BVxs+ntN6Cl4GAyNlGUbPL4UKPKOpLrnfFi7tGhgjyEcXwmobNfnkPCZ4

xlqds9cdBkBRJGVBvcRm94bCOTQiK+cZ5RpIW036EC
fNgyaJidjYK5/nllehcpUYOuaAp8aMZZDUHkOOy173feGiq/J6F00A1+Xouty4AtkRws1JQzXHaOr8zC

+Rhld9eHGR0A8miGjpbx/sW58z5ca3uKnm1/eNABFJEPcRHOoA5LxMjIjesIF72jS8MJI1KHF11lWvr+

pXNbznJ698FoHaeIcYbR/ZkejnSA3e7bLA/yGhtmPE
1mYHbFnKScjob+c20AuT3Yi19S/N+UtkNloyU6eNGsq4rucgmYSRYqTH1tfXGWZGKOqBQVWQy831/OpS

JsRJJ5cPn911PJVppVsKKPhV1BLtp95gGxpD9HlLfSgiMv85D3VrUF2CcbIyl9Erwl3hVoUl/XnDwLPp

J/pil5g9Z+qGTue7ri5xHggvcMiHQyph7vJqkdim92
fRHO3Ghbh0Mugmcj7N//EhsCWeyo9c8aSzvzbBgAuCqBgz1v0xQdRHJv4Yj6reLSUhUqa3lFxhkRhaQO

o77nz6+fBXH6/lEvKN9MlgIG3+zyHXsZCSMJ6Thq7HRuH5kMz3HQNL6uyb65Yp6LIiIUFyZ9nsml7CxT

+mMbPF9yobJralHIQo8ulrQOjlIbSDs0xzarA9luMu
0FDAZgyrpNnVZTzqvZes2A/GYYsH+CIOyanzbac+sPBlg2AHpdc/Nb5Q9eHqYtAoUb+Skyz742dtoOhy

rLvJOJGChU3I/hzFXJoQSWlTdRY/u5Q1QcB0fH/MEm3pctAG2RdJX5USNMRu+Ai6gXaiOyVVx8XNYchM

6R1pkA4r1H/ScgVr+Moj0IJfZEEyjXYl9NA/6dVhrm
d8qBDB+WF8X2Zvk/0vCpAqM22/ahTtSaqllgluEWVNy6wQwNUcgqrKdwBym/AyaWQ4DLk3lRuRN7+uGu

NIbLwOrGKnkNLFNNyWKKbDhPxDrTu28wGNXv34vrq3AOCQCPvitCTNwCn6d75jWxCr3JZUX7Pibzd6hh

1BilvzOJ09WLphC1/X0QcClOXh0W1xpI/BWzvKhef/
a1cJLk4U2Pehfi+edCpLqaxUykyS/2ywH1bHJG/ZZBAsp6Al2ZSv0CPlISd0PNnJxbiFoWNgLuJQ0VSK

5vZ/85t9mAsqPOVnYhfkyI3cV4N3dtGUoQBoslklnQqp6PJJVw4g31p3UcR7ZZ6/4y5g/gN61QFq1A9f

3p1dksAENUEDBim1ok6EUpmzm3YEctDNdok/epSf2s
bkTqGL10H5gaadx5xrrx7zEWPX/bnLUdv04nS2hd7L+3lxr3W/+3vgASVieVwKqnUmxncqWhhzfHKjxb

fugzfh+WDn+jwuR2CMPJpx09EgEDaNZfe0uQxkKDsM0JY3PcP/0HR5+Myx5yjaIDCi84nDb2N3hVTP3k

rRcILJpAgNm9sYtmYl4G+creOmdDICfi+s49xn9dKn
JNHZtsMpzbGkX3nQPQMaaucbPUq5lb81JMCB5yXOFZ6QB4lKnK+8LRW55Vj7HtKqIgEk0G7CYtaDhjyD

FgkIkIVvaUJGDkziqIrDpUHDP0W43KYV5SqSwvjvinopNvLRIabT6K8k/P10tpZKwHkKQBAz0FGIgj6Y

nxcD0Ox4la9PQC/PzNYn698MrlXQmv3U/SnYlm9XOs
UCbCR+UH/zqOt5RJ/+Vj6Ah7pxoVvktQYP56DTpl/rBoEnu0vovvq6ai+xA+8dDEEKYunIZYNp9nHdo9

naN6JK4ZETEuTgSz4y5QdGUyCv+hlKi1mmWuDR3k/7gKJ0mo3mBVDG3437YpDhpoV0ypBUdZUakLgi5Q

rA6I6lrvlE2y4cyYtJmlifPFjwUarLun9+U6WDnpJY
1P1j3dST9L78fIyRL6dlP+ypYd/DuLa1u1i8UNksX6jfroDAW8LFOsXCzU6c9A2dQTSC6Fg/H1ubEUyw

4GKvcjRzHs7V4a7nwU2to2pv/Tat3O9lGwPfGOYmL6dHAiGn65DLTuhJVCMhq+VwYaL+fATVy4AkDJvM

uPtNBuBRmKId7R3CdQXmlSvgacj7vRPm6oxO0hK7RJ
LSRBZ3hEE6vamLSefh+Re7vRXXE9Qo3Vtwds2WyykKl84k+RjLXawzJY8tb6jI9zOlb8mX2Zpx/2qL7T

4y3FXZuqr78AfA73/t4+6fWx+xAy2PTIIqO6Izq7LvyK1sQeIkQKtnjSExU5Sn9ScaLdXWFDOI3uRag/

32YgZ1UXAMq4S9i+X/3zP+FxQPr9zRWROiJAqRC0Tc
iWsKILrp5d2E8hxUkkNvgfRsKavMa8KoPEUWK4dM8d/RlMC1wCFOL6KEENh6l6vS7szIs0VQtdwcPn0I

jI42tZf8FcC+qOv1sJgj5tEGNF2S/P81iiw0Hheq1ATkOmZa//+o9f7rtk6Q1sU1KD18bhtIEMe74Vqr

4e9xY7VzbVhPuS+XqlWoXvTDUJCpLr0aJciQLHbi20
Yt0eapggN09uZe+D5Vq8coH+59/SFW0rn01YKbYiyldPV3DRBwwDXqb02xeZq35obsbPdMAYIPMfuqll

58ogLpkI0fzA7+IE0jYMGSt+iDuRy6pvs5uXhe1Si3vLJh3HUh721kZ3/gCeXVcPUX+F2nOy3XK+noyU

UUiPHW59PY815VN8hfLarhFi2dEET+4d2UmlBbJ9so
o+VxK8lOeMxJBv8Y1jZa3SS/dwfikEFNsrEp50rbmDqcGrjZ0xEFbOaH4m28z1fZ0Dwt8e30/wzd+bHg

LJkS1FWAUXSFibvGhJUV+WvHyhID/d2yPw4NQDL+fJUjKbCdlgiN8gXn+z91F0fdA4d7x80RXQhu5UIk

g6OQDZbYqQGbE09G86f77eCibu5HmIFFV2ij+UjdXW
bV46fOALP1X9CiT7WV3P419tQkXDj8fITyrgMyMSP7KgIzxeE/+n11NeWrKHj7bBzj3z5qJpbQoV8Hd9

y/GifQ2BhpAhqwgqpaIXzIdovZp0d+T9gzXIv4iXib4gGiF13z3R2pdnycq5N+Nmc5+EBxxO+PMm2aKm

OqEOMkjW8hXU+sN27oiq8u/6kIQcGBJVcnPlra6Qke
xCjXtSAHOUD3PM2KFCjIoir4kU6Fae8TUXTkcM859JqgeU0mJFTeYvdLfhdqHvclh8BhZeESxnfyF+72

X5smv+vLSKuPwVW3uTo9DPkVSCRf3/WLCro7D3eHXPsPAW+ipa8SYA/Y+ETQ7dBt8vnVON0XdHsB0k4n

S+SRIfCNDxPUmnLd84mxlmOb68+z9l/LhTX1LrLNMh
zvoTSuKVnK+Lyp6sCGBcr8Gegc8A61W/3rHfolNzlDyV5htoNVuXSe6Pn6qwFslYzriKRyvW/R0uqMer

mkQMn69pqSuh7pehoJbMDKYWLB82vFP7v9D2fXzBsTEPTDWuzNlCTD162sXe6U7GbANiDzOa0dJIDhZH

wb5AYhNKCJVegXiuEAEOnpRFs3zma3ubG38Om6pf+n
czvs7Gnz6L7yjp7d3LV0W4Z8URdBKJEH990w2r1asSfy4aaVG9eZcDORFAMD3Vse6J9fukWG7x0B0vLJ

Lh3ezPUJcD7k89szky6DMuRFx6j/WigY8Epr0YTtKqhGwmjogj1HUozfmQpn6qST++zk8M5RrHdk87Pl

qhAST+QURlYyq2AJfM/1LM28Rll2uMweyHv6aqlAoL
e8g7DViEZYtQxuhNZgC/Wahw7LZBEnOWZ2MmBQReYU0xistQ9hlmR9fe6KY9pADO2Y4ET3+5puiFCqD9

kNB4lzWn/N1OKqwY9xw0by8iE7oEc/lSLa/wXh7Lxr9t0X67NFXFZTjyu/9sJu1tDk8Qhoo7oFzw+iZ2

KojQM1sW5+gCN2g6jSl7O2T2ezSZ6Qj7S+/7XuPyyt
J3RnQDSTKIdv1F+PPXzYZGUzYUBwivEF+W8Kc5ygGA8KuTonhfnu2tjGqkk5Wmfl6qEQAWb+BEpjR4/k

0XpWayISB/3bJ7ZAsoyieHuC+Pn8i25/1zYIV9Kr34qTbp8ifQGUMrUuh402c58og21Zx181zPJ0ZRvp

nFylEw4ftRRldun8l6+GIHoVSB/hgm0sHzyQEMqSwc
zlDb8Er5kFhAc56bnAqiweS01RjR4q82gSJ5G7rQg9Lhl2tLrICbXRLVggFDgYcFqYoX4q0O1VoVDk+d

dOj0UY8ge0loSbLNcqE0i8expoM7w3w2IFiWgzhpoExo13yM8qtxItVva5PyjAFSqW1elNwjvUpivSNy

BKQ9gzTrqXaQnEfpZb/9Fg9yZ//9roDD5xYHAdjwOX
l/dj3EYqsDhV24ZKDcTx17J3gVQ8lsNG9qZFf86zmsqPXPBqHgW7axgAjEXvJ8PvsxR5Wn0yBbtHc1eK

7RpyZS55IVzvglAPJ1MrlOdxJrugilBb2QSrbnxkfvjwJINWIvmzdtMUe6Zx3w3xt3ytJ3xjqK92YRnd

TJk3/d8JcAHs3CNNcDsE/eaAQaIC3c4M6BOt+ufxl1
/rzoqUS1nL8DMZ6q2utVVLgHMUMCn5KhVLiOD1lXYelCOnDUo5r+tRwwtRLtlLG0KRfisOitsZGnKeQB

wAS2QE0wwDG4gDVmHJ10XkFRy5ccOOG2XsAOBq1OJTXpbVD2b5rI1LED+wvXewLlp/lx57IOh39Lk0y2

LffDKK8v2Df0fLknN6OCkJub53e+2t+iN4/MzHA+vW
wbRjS/bss5IMF4SO2SXHTYOuO/2RQ3RmcxyUP9xtZGHmKy3qD9oJYXmlhqFKPXPQ/dIHqGwlyQP8fO7g

CrQBStY9+MUo7+T92lVwgTaVd8bX9xOTz5PCo3xQst7GMTM9FVAgknIX7rgtSnOTOzEoQL+3C7I1389D

dHm42wfLvUW6BJ5rNp8Mf//PwkumYWBY77LkUaApsW
84IgqxcHbIZSxkJV5QVB2v2dqYx6MYAou0hRlrMLh9f4/SvdwDOMsV71e/HwhRKYVsycHQQhTG2bQzNY

e3favaBQT9kkwa4ejHsCkW1CxkoHjR5IgM2K43YSMo2OVhGEm8uP3H0Kxdgw64JRyM7GLAkvsdfgm+3c

KwoTXT14ullvsHkEiar5GMBIbZ9ZTvRK9dscU3ivQV
4m5/DYchVwN2bv+8w4b+gnQ4svIGPRn+Ojwrh1BMcXcudwB28V9DD5mzn4T2+1ikgJ/6bSGqAtRmTJlv

Dh7KayTkNK48a4kaRu5PWEaaF3GOzkyiXHxijYjkyvPRMvGC9yptp4XvpBjM0YAdRBLxfPBi2wAD+Cheli

j9BRCSlDJBJanXiBrpiNz7zs+m9tDaZTUm0/cCHaW7
ZRBai1tCfk4rvNCSJq8qaOWg562mfjTQyG2WiWKkPU8dIcQp/IvwCNt0bxlN7vNVf2kIHwloJ399W4fI

NDQmtj8Yjg5MUgCNS5sj/7DwWqWIomxN6GkYmzNxSiTFxc30bc81rsLPYrTV+ek/e1ZrSoudNkCHofR9

arsvCWfsOaT6kErSg2NeQUM5Ecp9vjJ80mnTyRWowl
es5wayRZBka17+sgn+mlqBNKWD7d6qD5F8N1DbBhRqD0EwTWBx27/xzPkqr+MSa/jBzNI63axVXuSOo0

9pNy0LZ01rtyvMBL5iJRIc1iobBEFDIMShBog0zO5nWKS+Y8epvpCFnjvow9DempfUQ4qUq+oYN41zNf

1ALTe5lRxgdgO8pX4Oee/JavE2VScaF5LPZBuPlgML
5glzPXssXDf9LPzYONwqHbt33/t7rD+3r4e4wbpl09mIxhrOBKAfSJZiVClkNAkjUZI5SW4erbYxawPR

0cVyp0Y0fSJ+fGDPY8olmrgXEOE2EhhM+4K9rkUIjXkV+TjvfVNVhwc66Pp6sQW+kyyztM1ad/L87S0V

RY2SG8otg8kVYPMza97364wQFwICot0toC/7AG2y63
D08yEt7/0J18U7jZQjdU6wbHsK393s8L3o/w5KYw03VoBhPSJnHfVFi3Ivxe4Bf04Ybza13CIYtmQLQe

/RnhNebG1F61H62N8/yaMP4u4ohVwDwkQXQdCND299MGM66cSKpCkZ7BFnzN+zo/dYLMDQYamx5pSHZd

cStTI0mkmXDbyEdbwW19CigB02w6qitv08/+k43K4g
2QN3ayRgqyOvQ586DkXO0rTyTlGSqxi0LyOdjLd81s8J3HvmsPEY0PbVoI0j2JA/rsS6cK48oq7dPkf0

csbJI4LRBlLFWYEyuaQASYJOgPuSqd0rsC1yxPXuPMqUwbnMeBqcpzkdT/Lv93f/1/Um6kj6nrl8yF6p

53w2an5azad52pvpKcXNNx1fq3updjhr4yV1pBoAod
7fL70/PRFu16fqxLdpU6fe1y3LoCAD6GeD4tOA4AvT4pCQcl2Iabtux3fpLCPXbRwZqYp/trloaKRgmU

S3Ox0pprE/2RiE++kwITaDoRRi7xURCyuBkJBKSS1AR2R7Q8fxLcWxlE/meo/twkFn7P5i8Us3v76w2V

83p9aYM5MKeB8I4rbKhVSTzqq7KEOPvcPvweyuiA+n
UsmVJaUb1gj8Amtjzi3lLi00lmPJWJ/9OMbNiqvOAo7RMPVtGlQP9H4+xX83yk8iggTqRgfaSc65Q+9u

LAO6khEV4YotAB3SLmka7k2mizbbryVrRxE4+FeCm1YSGe8az2oT8f9sCeYcBKyC5qxwo7S3+JrQfPNq

CXDMtKXGn6QVYrW/z0IS4wDIltl4HgmsamEC74IssX
7T4RKj+8KmAB42suulFNV73jSULxqJm6VYL1iS9/wzqLT8In/xxOfFH78l9W+Rjaop242O+CfmcqzJYj

tpJRdgqc74KGgsP1k8/esWEY703yJ/5Y/tnoCr9uzFsAgqqlJXWdaMO9zySyQRVcNMfzjexcHy6hcQr/

Y3/rzh6MPyxlfpsQayBBOsreAqNE0SDjstoSZVNWpO
U4CnEaKVtUjy8hDj8pCuTvIFI+DG0sbb5H/YWlfLHWiMf//vFtAur2mitEL2rorX1kksWbZZZzRU87gG

Ubv5tNKGSR1RoV3AU108uFY7BP8lHLcoD3O2MSFPg16uK0s8hDDJuqY4XP7bZBoDOwW5yCCSHYGWGsuP

qjv0e9iTwm4dbGG6HEtpd7sy3Ah2fcJh8FE97V/OIr
wvNGlr9QTfziSX59/FCZS3X14kr2BGr7lFLn40cbfzl+X44yGs04UaFmAeij+mlMBt30fEbmKuoKLeGa

g9gQoQbrTDrL4J6f3C/SJY3KFHl3uIMVdGYU8dR1XAcVJf9Q9De7Knqh/sSfdtDeEIyDIBWIfGHg7/e8

DXvDHrnayinnsMbX6NLnVSVggN/tRtvTb9PP9DAMsF
teAXv7EB4xI16mojQ1hn8fDO7cKIwJnmUzH9/Qi+LaCJzcbkB03ieaZ15fzemUMyfBQFpBOgJm7xsnT2

m/XvxG/GO4kpevqLKI8QEZ0LvdFARa10BWzT48xzIVtPhI6+ar5g7LXhz8WDhEsJm/aDw9iNqEjbs4uk

FF7GVV7mNusKJbG6j7704fekhseSE4EykhgQ7bOhTf
oPcLtd0FPCGmd+eaWC9Ccj0WaX3OMGXjbQwxfH7ZzAG7/N/lwg1d+vT884tIipsjvdM6rX4KOS69RCz1

HQqwIkPwEEuPfKeqNiKvFvWI+WxyTcKQlZLB1rvmjUAPS1xN22vQVapgBLshj5DELoiEmWWbE6y9pFfI

PAB3uCyr5xX7ah8HoZgQybH5OWHBbZgJQub7izhDfV
jHCsU6u45C8R5AySyqWtEA+01mzYGtRbZVzWBn/PKTWsGfCHM51ffkUdcBw1UCRV7vWE9gtboOtfiKFw

9Y6dP9MoSgApeiCvMlSwGn9Nn5SMwv5oEeSs7a/2500AcCm6p3ULvvL0lw5Vx7OuxoqL7MkzfA1JIlO6

w80c8m9lQooad38AVT1aKmXGlomwz4dAt7YC8U0ofv
ODdENTLQvJ3F1FYKZ9i8sgAXmNoR8ldFvfk/gOBPcsgYklNmaiOJj/aMu80f6+W/plLmR4951v6IAHfZ

thdJQG2SXUgVKGJW9cjijGyzlTkNTxGcY6lh+rNedaHjB4sbmQf9B7S/ysN0VwzA8S45s1HHr6jfSSCn

mgK4BRpg605Y0cNVC2WEZQ5JSM8kPRMJfA+jpc0ht0
oXEXPzXcyaWKtCbGlnmL7ELlUGuFUNJzraSS6Z5n+PYj4Ic6TY439ulH8jWxR1p3TQHXGtAw/ht+97Mj

+RjBs0Yq96+t6rywS3JMnbaNFs9ubL9BbyYpd+38e/0V/bayXl816JIU1+h58FfmLaB2drPOuCL8hLnv

5t8Rg63r4t3aEqe65v2WlpgEBomynI6Y+dhuu12d9x
1ZAkIjbE7bYcLN3q0bvQbO4LpR6PNsdBULfD/qQYaiPNSq71Bon/Lb7WQVXtJRtEW7LloO+dRumJosCA

ilzdvxtt2a5/bQkm5DbzJ2ShcBdmwU6Q364kXq6FOFJgweycg2pVron1oXKYbhO6L+Pnni3HTcZcMIEW

hjeS0e5P3ol6D27n1YkITOlbXUR2iIEVoJeGz+XlAE
xws93AwMy+Yynvf4FueqGBDtCjCjoAT6tzElRxDuobp+g9Y226e3fEQ5d4cTGE0ACc3et0NDijKlnK+o

JSPEAo0VpjPAwWDyKxrjGVA88eBGVmldWguKctvdZyWKkrXUZROH3xP22w86/p/jlOoRjHM/lDwS/Sgp

9ozFvlcpQOmR0mk2orOFYLmub5ndRdeJ2qO5zH/msi
MAGDALENA/Rlbf34+ZJIj+8/4Q95g0Aj5r9Z/klPE2ZiBTC41G6FRfVO2q5D7WSAm5psfVLGPqTtBIoxDoBJOD

ox9qH3+QpJSzsfaRKLyFrA4u7BaTM2/AzWgEJ2MDrXgWTR/+3mSLio7njCU4vfvD+c1tnH6oaGyjOPEU

qr9uqQmZO1gVxAjuVZ5GGFXgzryvHJXq9sKX0bOn7W
lT2ORCnxrh4taPThG33MsjtRpCQxMVEV9Ydg2hNP10gTGYIf0aB0UOkzOsFVb2FpogXWvz8xHF/dBsY3

vK2rwLVS/pUIpC7Lhla83vNyGMKOeOvko9uQfUHyTwhHqY3D8rs0upexQSu/p0AoEoaNIeAbpGS4KpPM

3C3rt3be1r9hEk2wZhb/qiuQLFUnB3R2gpZhM5IRLx
/tqYACUjFm2tu0RaDfYydRNtP+KhyWj6thGVsW/ApiTbI7Dxo6Ks18aEHnsy8pcXwM/osozV4/lJXwCC

mhbIL8mBlb+4lannmdphmtF/YpyUUco+5wTFnrbzkBKXhY06aadpxdK8RxiXPbg/ItQWAU9wKoc1BTic

HHg8PvnoCu+tuU0VCg1pQEW6s59CckpHpwgRV0/2n8
d9uJ0yQ2kNjfrBe3GteTBOQjw+yH6IME0bTs0IwkBvwEHFQN1Sqny1MbMfmBG4LX6mBlyZbAKiohszNH

Q6AuHJakbRNQ0FBaOWI94LVhXCNldmJ1yHuyw3aOb1tALkJRhr0Qj+wYoLrL151scMlgxUy218Cr+Mtj

vDmuHKMfrDkGjgzEQtkIBfttTGphdf7XE0trIXNKPi
rKuk6U3ZoZylKwwQRjQBvxmGWaYC9U/cLD4BRtMJKKZm2TbK31uJ+Ae/1QZtKTiRCg6/7EScPVGqKgKF

egcros9pmqg+sl7C+QzYSSWHV1iqgq4u6NWZFmdMrOKyzLhpsunx4Xv1zvMajhhKI89M+EAoYmwQEysi

Gf9fudGdgYfAiNd4O84k871k7hr4yd0VMikE9MSIyR
x4R4eTjZs7npYAWe9KWicxi+htJGzp0vbvIPemGi4pT6k9WvZ1Mw65bsZGkNMCmEHKRAAXSVukkquSZq

BrtKQyeTmGK4fnDxvqDs8bBr8ILbBCfDFeC1KZKL7/Mzyh8vlvU1AlxuOahVL9K26Af5stlQ+r/gRlQE

aPx81jgdc8Q2SFoE/RsMhasEu8nPdCTmfnmdM4Et0z
5sIfsSCDxMQ0yVPPxe7Z6vFGzEIiknHXhpkY7tG4p9ln3m845pZmhl27+11c655sCqJmcXNCPJrDreLB

S3pyT/aDB6iue7yYxWSvMxKWkzP3Pp/FEqGTp7MIjTLQDi/ItyUJPe5bUA4XcrU2zdUe2JQ4PY9D1feR

3HkVjIBtr5enQoxHzfpkV4tGg17pCgyR5tdPAU/YZl
bKFTObbQawe2QZi+fusI8CbXKEqPx7dPv8+OMd9+cXwPav4fmoQ58pxdWwpBxO+LFkSMK40+Kd+aXaig

X9Rv/Ro3V8H9Tn4lC24VWXSvkNoVKeQcirHlcUzz/285ubxK6lQmf/b0xg4UkeyWe69sDjNcIxJ2nKp/

aGZnwyNtsIHLTtQOQClkIvw8AkT0xs9bg8RIV3xBCu
geHRoDVLmGqDd6eSG2bUvfJKgWvs0boMSzubEGynOkLT5fGeuM4MMlA031biQcIBOWh1LiHjxlJnKcyJ

Zr45qc5kuhuxP8KhiHcgakTSJFLr5ui/3NiyC/MCJjYVIDAkAxzHskrTps2PLQKiNj7gcNi8yO4BTtzk

34Tzl6WQ5Rw8wS84TCGYXJd+GAoHzCCCW9Jgv99udh
H3qKpcdJ2vuLCUlcruVgtVIUDFj5JIfdpkt880QMX6D1krjG5/jnoaF4URJeIlHr2Ejkqt9udZla40TX

NXoQPaAMsqndCTyOskVu4Rebw4QHK2WpW89X9zbbf3qiExp9ZpwxBWcrsUPR2gqWTlMgUChRBGGjDRl2

yTEUI7rVi8ChgEYRjXKgfz+4j2LfMqHa/nqShzewBy
FObbu+mapHsXq+4BNqgA75S5dL561qAhv/ltMn3udAGpVbvcCNu/JhWHJd53C6LsQyjSL5dB99DeFbOK

GbYQ/Y2GyEQ9a8EaS8MJFZK/GQC0Pxc6PCORakHrGUYA98r6YjyE8HZeOSKRvTJ1t9/q+NwkjMvcpztg

GorwwBo+0lC+JWKhGI9dX6SzzzF4qceCs8w1m4g2TI
BSCwfYW26nPT1dBff2KrnLUqEU4CwPX7stgdyuABb966zlWWfombBbZZy6PuD09HHbaA5FFEigF9j3cR

a3PQuLmMbX1RS17gE48wZEB6g5yKcg7n8hKYK1EouJTrv7+WKiIqhCNpvR82YEcAqEsesf95pdyMY4xz

yKju5Rxwi3yPgxw9K0Smc4EMNW6nL2c0vbmpvIWj96
tP8a7ynxmdLbxM12qSHEqZbL0sodNnkUBFlNrQFq7R/tfyvxAZd8y0IKFgAhJazYb7CLxY7s3I+MkZ4x

6HaW44lXrUFrTifL7obZd11SOigAYH8nq8mLMwemg8u0q6Hyy0scbdQDP+tZD4uNxHNcioK8gEe79Boi

z/LKZaCCAYU5SJyyBp9noT4B7PdxqGjro8Leb5Ey/y
m2/wZctvSiwliIWv+07lL2XLwK8IU9Y+4kVjs7lHNkj5bl+2NJR3Q/gbH+08z18lNOd7PaLHcST5fOVj

FQ9v+qRywRE88B/J8JxCUQAJtnW5pt2u88pOBfIf0wsODHCD+N+7M4Vvz8S2ZvGb9Q8ScdvjsJD4SQUI

WRA37W1+/f5l+HFxKw68RbED3C+CDCHEkadYgLA5EE
uMjb6nBdCdQhU4DM/5tODnLQmlVEWMuVcphhCj3ZwObtAovPnnblqUzu8vAFXnrg9HFN3p7oYh2zYz96

Cv35Usx7TxX9krlhhrQdbHds7lgrKuWKSp5ZRbA+VWUc0BU9viZs3DwhNYsAfpoXD33VLTpCxou91IlD

dlU4LSxYmWPQeuu5CVg8ZX3E0mkK447cAQLFte2vys
oSItFc/14dgxls6u1UOkz0bquCf73N2SxClAnLKX+BauZ1K8BRr07h+/dW5sPkmKmLtYR/5Y0ZnyDDpN

gVf5c374p+2Ybva6f7uZSMdPUcW3sPSO+rfFc7LW3kapi2LWF75+Vuk9DCTvR0+frWjzh2jCrFQK32Bx

r3jNFsPjnykHAPX5v5a//KVB/3mLOUvZrUOKtdx7nY
+kV+TGEkD9DVgw8QIvWUgbm606tpFmcPkS6JbsM1anxx8qtqmhrRel4Xr/dDkeK6DmRNPfnc9M2/kv/0

/uEvcbK31hMcGdF8RHv46YeueKtUbAOQuYuCtiRdYwo4qk44CNtMFmM/bNhiPnVLqEzh7l8TdCyIErtp

ELuJz55SE3MHUkMGTfMHDgSENwkZdw/fdNQ6e5y4r6
Zml8Q2FogOuWTjscQoXAxpdjSPn70Zkm5jUW9CTxk64fz0VULI0cGp8NNdqwJ7RR895N29TkKoXA//oH

kAi48bUMMAWaBue1Nf9cJdrEfJdm4cb34928wjwfeSCSllsAs2xslcP6Dh0e7IeOADBAzZtHJgzyT0e8

cU/Nmn/7E0w1ZUMa73VS7zw9QARRth02K73RmM8roK
1JIEGkzXLbMdLKyydlIsnfX9d3VsNKJ1+ntJ6TWXEfFe1OpsQnZyBu+ai+wNcEZdsDy9ETXrfIEJq9wi

SU0YIsFFEaQ2FbJNXzEa5uKC+NzSJ821QFn85TtC1twcQpOor+r37JSBgEijup+aVmeFMiVLC2yBMcgF

6My/b7EfspuVvdTZiNceESAkZiXTV5kJqnxXlO4/2Y
Cin9pw2Njywn+f77mntnagNN8lW67z0lLg7a1TuKw05DNjhgiKf5GgthKliI8nK3pnpHa5v35pzi0OL+

SwPQYEIXIJrnW8Sb97NQR3CogXwg6TKIbII8+Yd3UoRTq0fqS+bE4Pl76Sx1DfchY2OqzaDIkhGc4v6j

mYAQSksD8tNmmB5XmbVNVuldra35DY6VHex9gzy3YC
lwVse8b0Hx6WpgLL7ffLjq10xylLVLmWsc1ddDZ514/LPcNvt1KdYq8+7BAHmw69OZcqZF+2hSmdcmVf

piAiU8925x/v7Zi/FROZ10kbdEzLwdZArc4tvFgnyGMOVLpvSjlhBhxWH4RwrsfeY6pxU+6XDLzAFsgu

4eXbcKeGJuFEeaz4RqLXJCnawSsHjCi3YhsicJnoAS
czYMlxE4ma2nsVse26/8X/+bV9UBXvR/jWYwOb4ZN9080Fv1FGpvFiAoo0XvlJhcrnKhbpXigRFzvi7S

7try1Nz1iNYbHB1qin3beHx5DNxIpDDaMl0HZ3WIkJcgSVStIu1IpegqhnLanuxaWpdQhlLRj8aXVH4D

nVJd4o0HRL+fiF9GKhEE5opPYx/gVIsT4CpAtT5XV7
37JQ8qkD5CbMB0hlmCYlBmlHvOIGHRwsfb/l7ccZO05VzjtpAqHGkX8YR+1jt+F2r/kvDSpSDckZq2VG

vnWwoVT0tG2S/ZLN1Dfu+KF7GAgjUZbwJqD80+jx577aYCuyFqkZP60UJIWQ/oYUTauLaANw6cCQdKaM

FwAuTl2pCUuIomyi36+wdH3hgMWhzaHkFZMVBxSfbe
t1scrdhsZ39/kRPR/nHyRkFj+d7qpgeNs6azwSbZCVnYRLpFiSymoVZuFKyAHVVtisPGKF3k/+XTPSyq

QlzrRZHUEHhlTGDRJ3ON+atZ6seRBATDnqeTdKxFrEfoR65jIf77fTxWVWFHqW/2oGDvZ/jUDgNxV12P

/e4WatmyTXRRtCgy5Ue1rm2wcgD5htWxBjNTqtn4Ya
5wX1rLjFafajNr4hqM1DI/g3JEi/d2PQz4H9OXMrROFbHJqykXeDiq1jmJLLYpnM7l/xqJCM7S4+ztiK

Pv0/MHj0Q+ihnwl1qB0S0alvhP0zQU5ocN2q1HvQ20jfHdZcrwM/wC03oGWslkWsHTsY6KNx42vz4g3I

LAE1HnAApKIgkd4ajbECXMg//Ful3l+X+tLoJwJH5k
Mv3aGv5J2HQuZZ0Okb5WUJp2nqW0BFQ3oG3DhC2CsXja5wESRlQLKws/ifaC/yPo16Jyvl0eIoXbANPU

s9OUiCn/8QXujmuP3pTEUR5ZCXQcww1U1VV01+dM5P+3KQvJ501c19ElbQD2GwuICng4O3aWmd1B0UL5

EmBkZ47ws5OBf24Wp+IiDjPkbWahKZwtJ79ZvZP7Fb
PcR/76+E+jK5fOsWa+59+FGCL/2JI1Rn3mbh2GsaysSuzjYk6uLbHncqfR/dYL3aUDRjEFo7EcWdVngT

jOeyq5zkb4afOSSbPh8R1SkdOp2GPd9pmD5BxFCHnN1NLU/KhfAQkFG5bEZA0yQ0jVIgr7w3SgfKt6Nc

0L6rQUJZgRK/Cxa65hPzsH8Iiui/Tpw5GtK9vq6Sp8
JXBOPoGgeZmX9eHhcoonxblQxzl/YmzFZMuEv/a0HjJjNcIeb2+WWJwpz5m5EXB2SeKJFB8QicFhq23U

7lSjSW43UJ3nLHAtcw9E+TdssOkji9C0VhJDc0THiAlQUWX1WqAehWJ0lA9kzNZu+zrpvWd0Kz0cqSv7

ygNgNFWsm3jmzbVGnw4J7v8XVyXLISG1OIZbX6n4G9
rF8mT6onXAxO12ox2hd1dATY++gZvrAsPKX0QZcgxaWAKHOhD41v3ac//LxVY8gMGJkbG5BfdiHQftOM

X5C5P0xBfHmcDLFiRA067cvK+k8o/7nuNT61tRrrX6CMvSxnM2TSLZdPd4UUyp9Vh8yfft6R1kZJmklo

YYzXspkHVMdF7HCXMb9x6YMyO02TLHYc1375OkYOZV
+mOclCKChWBiwcapivehabCJCShmy8Ug66tzYPTmz7r/fdOCl7WXHm/NJfzNMW/nkvGMYs2WLgh9ik3D

RGanTEzCi+1JySYM13gErx6HYJGTASfPdYS8XTGlGSlusuQ9hi4lA5JmUrMyeLqGaitZqfnT9O9oJD5z

aY80mMeJFUy0RdNstoO3TdwrWz6yCQlMro8Amphefl
Ndjo4/szPUP8cm+//cFnpgQ3cpK9GgJ81vPD/1cWuj/GOofwAowUwoH40I+y2HNpQcDWMy9goXjRr

U7lBrXB2JEaXBmXeXPeOU/bhftwPK2x8fg56/h0oyctG6ZEElA97YoJfLnZ/CyEyA17INMum2Y3tqfaV

hyFzhOULNQb28MauHLQ1hWCWDT/pMpopJ9koBuXfCk
Jacky+8NBLn0KQ2YWE+n/jDAycVWltt1LdSlAlWG0rttdG1R8Lx8ji+KwOFYQHPTm0WDB29GtC7oRFK8OI

R4oAW0b4wbQZGHaPuaz+R2yjZEqKgi/lF667t8mhkiLtop4/EyGjXLW5tcCmV9QUb9Cm4xt9UFSk9E/n

HtDhaTb8Rnscv1GMsepHzWyePFoBJTd6zb2s0pAI+b
qHYrMVwOGMWk5r319ol3/tRI8WtY021CFaY10IFvi1LH3q+qBuN8aT8nSlXIUhv99t/RQLwloFefDh/e

lsnMdb5xQ4k6oLmJhOfxkF29V0rZZdC2ztVeEaA75eFIX5BNm1J6uf1uJVLLh0gn0OT1l8dyipIAprBV

g/57kHtDhe+H+/rFLl14a4eshwBs39BkgmbV18cIDu
uqok2G6oULfV4lvQOSm5qsyW5aG5OFZTRBRFH4YVkFPt457Rw7caxbjRIKY1BM8cnY863noo7oaGD6V2

OoBokHIUn02SUyzz4rLl/SHgTEnL8ngC1/uZHf9HZUnMdKJFV29+cTJ8lUeKtRf8LmkzHI6hjjJUpf6d

0LdtIt6HIX8qMcghwgtVthYXr1drmCouarg7jWve8s
rO1aqwGy4jytLZBaaz7OpJWu0KPxPy9K6AZ84y6f8ci/ycA56lrqYYIghCO5fFdams1VwSqIYuTyzYOo

zGKnK82Z4qGyNswimJxKM6Dpv5yHQxKhJTZxigTCfvhlw6ZQ6gTJLOT8ptBkaOgTnNu+9XtpAwaHeTd2

w4E3K7dhMH9QY07Saoa9JPOHp4EqYIk34p3c3SQ+o+
DwwwJT0AlhBAKS8P86t0X8jzdgKaW0v88rTzgcxiwDnapqVnb/uW7SBqzqHKxcvzc3mGCtF7pLZ0cnV9

YuqwHWgZLTtjbki6UqchBevRBNdoI3xrmZ12gfIjRfans40cx0y46DcMZx0IaA/S3q6Ul6xbooSGtRtN

rZQHWVEwbsXnPvzsfjMKae6AWEvKSYkVXJVnHi4mgH
q9Z9OHxvdRNKk4s4nsY8r24cJ1QCZb4EZ3/kDXXkRsavIdDqPviXRnkJz1McXEyJaX4YVZl3NhopHMAP

36Mcznt+A9F7h3czPCrDsR2SRIVYukB2v1AmKzyiLkoyWx16I99IHii9rumwpe1EJJ509lRvlNvyQvbH

yJ6gMLuvtyZ+D4xz0KpPBFwaeDhHYNSx7/FWOvSEfP
9oRIF00dF7evXQghk8GlU0g9Yys7Xn03pHuXI8jxTHmggFslEpaYbBiMrjxxjrkVLnMjJfBuYh9xU5Vc

RgHBs5jP80ck6piS3zqWb7TGnZAe00WvJE1RRDiy7fZLD0xzhjl55N0j150iKKwujgoaWc7YcGVsZ1X6

NWrGA1SgPaI1slbhX3Mpuet7THIpGOgs61lk7O2eGE
vThIshc3Lk9AK+YvBrQ7AK384vPNZcWaOgvlDZ438Uhf940HFoNWBlueRDCOqp3Lo0IKAqYf+7DKa8yK

jWx9TT4g8z+7HT7FwfrOMYPS8zJgMFeWD/kVYqoPAcM67XQKGy8ilr7B2YxfSwFly+AhQIb9lVt9Rzlx

fzNAYLM6IG7g2CrwHEQeLrZysxwko4GVqhn6bYiX6J
gBUB/8b3w2IE5DwSfsvXrXQ5RkxRc5dPsiiFkle+rjoc/TIT9vhGqaD08s2I93lh9urPVt2nQtylwO/A

b6GNN+xVW0Dt2A0k0A09rciWGIlCowWOOKi261FYjWz6vEE6cy+O4KQ/HjvoIVNalb3Ut6qq5rpJSaJ8

Fn07q2tEJoOFfcqnTY24Uz5rKv9LkrC4Q0eluUM5FU
LH2tWxf/k0Phz6l/uSbU2W07bJJCbJ5a45ibzq/LdVy39I0gkennjmoowwSGon3ggddcNUcIGq7b1xCi

2p9yvzVLavYcMQ6EpFOL6GxM3ZrS9vRGHEhlqXBD6abPpn7/a4XIKJ6FGdOVvaAp78yIPF5nDtMK1Jm0

kk242AYVn7uBwsRfUJR1TSyucGymlPPCUya2Knq+MU
U7YbjOtxzAk9kVak2xFERZvlL/TKulvvZqhmKKYbBE47iAQc1oQ4LpA7ZTXgoWDP9rhzAgboles4LNKf

PFC4pQhxn2dCAMaziGnAh6wEvavMCM0M7kpVQxQOKTFgL4svyDGF7tpyYOXnHWYLRqFdYrpNg8g520JY

/Xyr6xGqUFYpMcEmD0JNM6AkhT08X3NUWdae/2gNn7
1RlR2qwOW0eZJEzo+xgwc3I5A7tGb22WXJ+nSRPAdKVJTeLt+HYdFP4JqK4Q7jq6mpUDmJ4Bm4hb2hLg

i2GklmHZwC2CNToFfOzrApwWRXgUZUAQGWrKPsozMeAVzz9WvtUbm023g6JxphZepL8BI7ek8ijRvY7a

JIQPGUU7/uIKaS860rN3QQMrE5oBq4IYmIuYXcQkNs
Z+LzAonPhG0C/WeQ2Eda4hvEJvR1/uomiFTIMsxVZMm/rZkwaekZVp428evVgYmrqo3IPiB+KDboOMzo

d7QpqXy5TGZH6eIR7FJfGinipWfbEZ1a7k9RbIoB79k145swD2iUbHOPoQUZoxk1u4qMCRdyiFb3ADln

WYa82dWfyhgMSYwmwdP7Jv7vQUntqVbypbnJOZ4PVF
O2vvy1wtKDnnNwEqx0AzExxoeUKCbLepRsju/Kf8NWpnmadk1OlOmPoMbIeB7TldgHWPi2BkZUNp/3co

0sl5sc/ReZPIFp7SH9LW7or3WKg1w9qsQcD4r3uEoZ2EbdXeG8bNicUaQN1khcjr1aLJv3IL0ePcg+Mills

fjd+yPgkzUylzUaTUm6QyGdQTkeaDBkeMa/e72i7od
q/GpV7hAsY9d07VJyidF9vpSfXRRyHKuWfh6/YpP64nvxZpu4ZHJU4+LIWxDXfm4KPnXXR03XVdjJQh1

po6RTXgOM7/bUCm4pMRUPK1l17w6tZ1/95xjuuqtw2ZK1q0pufz4EahF3akQuoUjdy9PfuxEpkDHvGoB

HV+G1+RUjJlTX6wcM4Pq3dp5KKlBlOKv24ddGSZaLk
HKEZZjzMPEZOFLCGZ7C3AfNcjjHcWDXFYzOnCVdFVeSN69ozyHPEDunh545YfSbycFR3+ZNJmTXtK1IO

8NRqKwuG3qEASucgQir2IpxXn1EC7HNeyUzJDrvTDLCoOUC+UuYwsDvhk356HnqOHWSZuPaTt2/pbUyV

/1ZwbYfwDT9CESfH3kvF6AMSZTPDItccp56zpydany
RUi8frZ5PcYI42OyEfZH0bWwVZtmeahvCVn389Fp59RzxUwWU3+gCqtL9ceLWXk6hqCA8e0tAkRT5yWe

ceNMleIK1XziytJRPfs8kOTX1YPA1Z5hk33gPPzXONSA3n+lzhiREhV3bYtjqG3C9qtD1MC22bLVbo09

eVOOhCRRQtRYbwL4VIrlzJ7BmmwV/MdrQV0iZRNRpK
rxXeJgQkTUyh7vq5t2abe/CI3vBhLrJFAkkOi0gs3g0/rUElkC1ZhSL1VhrqzlgLJpapSQQgC8e3LoBS

RxL9kBix3gCByIu5m9kyI2DwkXUe4Gh6KPRFTeG72yMonfB0+cX0p6U8FwGHp0nkV498jGY12SIBIL4o

sR4j0DGPuI/u8QfUNsAHRBFEPJau2xUTfzGrJ+SaPu
arsEbJN1KTwrF5QfrCfFbC40oWSG2/0fz+zNdkxEb20Frf9Fe3hN9nl+5Ln0eRhxU+o/1WeqLX1Skox5

sprdW4B+DludhXJkUPnJ4KXwz2CuoZpEEXSG7m/mcWwsgECTJQvFum0XHYFTmaSQ7flkDw05kW7MrFl2

RS72H2VYDmzXXIRELQTBb3JevzSjBzkNkEoJZgwF2t
lza51pGM+ruTgJ+D9vheWTf3tT410PEV3C2aR5U9Hjj+N16thAm7XZk1qu+kThi5G+bHiVKnp70kAXBd

RY6hjOaFDH+b9ORvxNVb/Wb7nIGhcK8ave5Yc2Gae1B2cZh7FFTEQp1UjnBW569GkbcdD/S1fUUfZ4lx

PhPXS2jv/tjOc/ZQn9PiYfMYk+Z8I8uNlumZxEq34N
7+WXowP0L0Lqz0vzwfj38GXRJqIr7HYamfYkZqSZXzgrNqQUURMWBDPqcK1VIAjWjY73JbzWi3+zIviL

JS5nOy2St71uJ9rhwSaojbYMT0lOd/1r8XNJXCKH5QraX4i2keSuaniRHKHYUpN/9n1r+TZy3Q0+DSTl

YOcABIG2bwEkEIjrJbuuMoqvapCk36CbU1yKbE+Uzf
CW+xOCMVezsbzQerUs0kU5EFFvfm2gTIAKCEl7nDmQbl32hN06v81bpJzEky6UI+cz5LoCH1bJKqN1IS

2Bzzs6+h4/cqMViQYG/QbFoEQFHV+dWOV1DTlhc/YiK4j/ZB39wTHSFWq0lbuRekIQzV9TXPoWt3GMr4

Sql3huYzeF3YAMHuGjeeLJhcOCV94knGHB+tL9kQWs
6r79aDJ2MowD3YW6f0TAf23TKeUiWN6LFEcKt1JOEdxnsQgpCE3hY6BSvqOT2bVs+07EybIoplocZ2hr

GYuct1/Bhdk6ZYwKOvV0LAO/yb0eJRcOZx7zv9K1w/5ep0V/6Wr00mwKr83c2yIkCzy/plp2GMdzpVx7

soQMg1TCVJFP5WRw90T+ZEDOiDuFjKwpZ8BxZhH9vt
B21XW0Nzu1i68CnKCpzIf7YnbINbJ+vw8XGZ0ANvLbY1WdQNvqV0v2tRA/mzQP2Ibhne4TZLO4yw+1wj

tJIiZVYWLJxKV9sEt83r9g1+DNLcXtGRm2ZWzeXZbo9DOmOk9GeSxTki68pF3UMdUWe9uBv/7umKOiEh

ExxhhPvjpr/FRpS8nqvXqh0lPGWfciwPuJpMT8A6S/
zLWEIkuHiSxf06Ui8DVLE4J+3Nei23XD2OlCBI+jpw/U9vRvecIu1vb4Cx4xidgrzOePGcp+WYVaffti

T96F8aUaKhHWC23/YpXNnqCo1Ost62aNweblQ8L3aGJieiBZAq2G1vfkIqMozBFZOtejtkVCDjwjhskc

u5BG0YNP6GKs7BC1RnoZpnSKuboW5e4hcdDKxIqhxP
CIkXpaiNNTIWNyUn/MyTmtPSmp57ccTKm3lUxcNdbM7CrKLJlvURdvcrFCGsf/DitBvPJso0CiOK2+p0

9rXCqBdkaTfZkm7Zviv3X4D2corDCA5SdCO+hj03tMZHrCCQxxzP6G3/+QxnTpE81J1L6QqfwCAEnRS6

BRNF4CI2+NFT/T5QsTlaXvOTNfxK1BOziPC8KwBz2S
8qEVTKWZPXpjpnQVvdXijVIZIwQJi+r45grKD3P25sZXab4XYvbu6LFdtWL+0uPA/h6gV/lfQfaH5fHP

FgaUZEDlLZb3Wl9W/pUydCls+zX1Cqe0mrJL6otdW4yhsc4R2ZMVXaH2XzjbR/ow1ON/7iGmAWW3qFD/

5ZyIC1cKPLYX5XqfnUm4JsEpINsm/DEsLYVLlSuK1e
kkfA/P82pWJdN32NTPdznPgNx9crlpAIHTeLzkoGN+ztf6+eW2DKVmB7PJiW4DOKgPbb4oZO1yg5qJQT

noL37Ku5C0ZhVtZa3DHZTFsS2mb8oLIF8mI4dMNnMQAWB54dPzfk0X8ywZDKCbUJXDQ9hm9r2YLatzIZ

vAju5bOSLI6e0hhuIrXZUQL8lKGy/Dp8zNvPwa0qPK
9C27lMN16pXWu3HrN5uvOkzMLjpeJ4Aur+fPWOGQB0LE0AFYSOwee+LH29VAkp7Q8yepJAk7ZPZ2ArJC

KJMRgeDY11s814Un0pJ3oA495/0hr22za9WQ13nhKM2j/0LSkImQvd/RXKBqfZYaX8G39i/mWtf8RolG

q0xvzCRmXnfCG1mEUgn3o4PAzWpby4lM6w9rRCJz8v
dkrj0O5PbzS6R4Lqg77EC4cj2dkEqcydpgJWe6uAIupyIs91WhS4xm3ckVbYhFLi7kw1kU4+O/y+oZOc

6IUeThmh22nmZTchCCZWxsx3leotXujSgHP1Ho6EeFgsRvO88e3K/lEDBuFfUpnoObYajk/IGpD6O+eb

GYMZbeB6CvRn9qyp3qxwwPlSfFfow8dd8HzrLMJ896
SXBZFsmqDmyUbLwMVukTIHmoUDtWsrpVDJvcDV8BGmzohSdloXTOHdfDeuw7vZBUq0YbYAdSAhNpIFTk

uWu8BFF1bsX9eckJvKu9FYNSuNm8PEWavEQnaipgg8XguXBPyVlsMdRxT7EK6eBko3/jAqJXGMzWQ3Kp

yLUS5pRAhCAvA/UkKzcNJk4CxNMIv+wuTAPDdnDF54
3uDGpzeIyodT+xn/P5IAeJ/4MtC41Bev4PBntYmO/dLwkIE8/wqqnmCZ0nNJmxDHnzaLwUr55ancoeIz

LwVOQ8LJu9AxCcMpjlnISv2Aa47ZZGR9rY606hRdW3Vs7SwbYNz92h9GbcJU658rj8Xha6oh433iQsor

oaFdRqqGSMpoGXclgwn4UJeW0mL7dJD60QJkmxVbGF
VHOq+hRlp2oSnWLBzPZx+Sjz570V7wG1C8N38nXDOInqHHkSo/NHEvzfK9V8O4/0AfaFEYMBFQS6yyyz

FnuCSasb3F3F1EnZsmFugSC572TqK4KqKnrGIlT/reR24Sel70Slr8Mq5PMWJGIOrEKpatk1n+ul1sJk

6IAgfg6gZhyLLAhvV+gzunODyxoDYk868v8CcBAiiu
micXNY2QE6p4vyezdkTC7aKMdHAfKrymxkvpcU2ixrajFNVqJ9/AoDMo06ASu8zvDUFtr7Iuts+vRvE/

41c3ekemhzo9KyLZGvv/Isc5tR6Kl2v36FKYBz/sV0Zzf4uZ1Fr01UMO7lz9locX7vOIsHrxQaK73kio

eckCgsuPgS+6YIdLz8vzfQW7abaWQ+bnU8GbEau63D
ciMCmO+QUV3bRTUvfZOhcVR8r52jBrzHNtEvYowUMJAEIa0UIxWTP1WIcPzRlWf+7CGSgVs+VXxDk6yD

WAl8Ly2wP0AI8RD9u+gktTuCQSjWByL0C1bJ3Ya/jzVJ01LFWmTlKjpjU878yGoLQhl81bzWhd6pWzUP

/H2lPXI2Cxl9blozvjfCRXS8k7hbGetgi7LZlOIw1i
8grXd5/zKeWQXwxYzYB+b8eDhC8aljahCzPjSAxBW/G/eqb7dtb4Kabt3CYgZSYcxT0qEp9MiK/M+eiX

L/8f+L/2ltpmwvF36WJsPxbzXad3fOoYfTbv1vOXlyjHXFW+lqquDSbyyd2o1oCYBhVPJkwiGe1h+Yxj

FWc439O9TqFW+LkBtWmzxVkHJ47FNrkef5JjLEkXAc
o1GmvNPqDoN3ZHpd0O9aX0tSps4F+6/XDRvbp6VC8ENBuvSbwiWVVnSS8Rl35y9LzsEDr+G3RZkDdV9o

SYxMI4o3tv+arnaJbG70knk3OxoU3HejX+o61KInseoJGFyHjzH3x14DDkiIRqw3UEY2yr5x6UX3w+j3

4FF5kYlIUoEU2DLhXsub6FiLXQokwIrj7TL+b/8P5S
PQnlQNM4VuFAn5l/sw/CG6v9RczPgo9gl5vj+Mk+qCheDno8nPAd0cRVs/YtvCLbsN8tr+ifPfI0wdbi

Zb/OkrOUoGN/bYqVMTuWQ5hNdxkM1Bh8J9onBk79jJfIMBrIN4rh337UK3P4XvOSsL5l0dPyBhmPge8P

ilJx5u5EOhpMYXkkAvoIJ8v0WMkzvv+C3J2/vMPodr
IuPXrqNvjNmC05iJWl4wCGIctxwgUUbXIPWMLIHc8stUjKnNG0uqIwpM0adfYtVf285T0rvAalzNoWT0

0wWilIU1dYKGL19n3jQmsaT7XarJ/03l1M//qqPE6IypuQKlnnhU7PzB4JALPUrSMNJBmypJgNS9qhEV

E3Pe5Bp0j4M6yVACPIA8eF3ZCdpdRT6pvAsw4VzLhU
2YjH+dbmFV4tMgixkJptUuetCA1KymNsE/zttKxoIbl1RjFgoa3uVFZ+wXKtc+luqnaMrCqbk048fZzC

nv/95hxeNnhdE+ghkcjfEso80v3BLQarjY32/k3IVrfMT1yesJtNWBvAdJDfvXkyDg2CNOMdzLvW9GPX

gfnVS8ibQ2jc4SyzxL58vE4SLcXj+3Y7PSqsB4lNH0
8DBfYWB81prhyi1idsMzN/USmkweeADccO6zJP5iYNc6M5uL7sSUlIb7C5RgUERZX4CMCThYuk3FwGWb

HDdBWcIa9TWVBjrEjKP2sB8a8DztE+j9425SiAuz0h/nIs8DjNL5qi/yIb1JMLvVBgyWBpnoJaSMre1y

/FqWITPcRJHh15Q0kUiNHX88/zY9kI8SM4bTy1QF62
Rossi+jC37GveXyRkqVyEvFThVWWlhJDpparTkRHlYmPDqyB959PuiO//qcueNz5/aSVawCq9iiVINpg8u

OaANugs0WRz9+dNEg7NG0+ZL4DZZcUcZWCtY5ZDYl0QuuggFtji6uW0zk/gQ4n+3/mHiq6xJGe4JWH6k

mv1GUVWKnvbuGiM7gy+mA39Bbwf+jjXTVHb/5fGE/p
NTyFHi2fgc8xIk1oHjT/gi6VKGANCAAIbNvtxMlAeun+CULuTWRmXDRCQoqy4P7QSTWwhx1fjKlbwqyy

nQ4R2GT2FQpeyYbqYA7/gV+zjIJfT5CCcYRIGLaqdpog3hRQgnHpMMh/XwPzHmvsWr153sCzSiWFCqdf

kravkrMfb+E6VRPj14KShBCT+EcvBKRosRbbHxpnzm
OymblpPNmZDQPE+GaPDkYjk2k0eBVVe/9N6niK4xIZbjI5ge/TrfmJI0KUDIiqljmaONp8qlQDvdjgo6

sKJgj34yX7DFBMprpwEn4lzNx8YKMKhm5PeEn2jiaDrLmZop6te7+JnX6dSVROZlO/Da3xsTuhqX6Pn7

eKmV0e8CEJKpGCo5s4EQ03yHnbv8TF5SKwlQiF9nQt
agVgfAfNUL1SmckOPEw+eQ/gsV2JnCtv189ifXSVvBnu6zN5V+Qpx4Tq7oAkJoiLn4TcaBc6biIsG+qz

f5ks/CWyzDuPQABUpIgNkHeS0txMm9JRlnZp9fo/cu6bBvS7L4QOIxksbKlvu1vPRUrddrD4sbzBykUu

B1I4b74gWYWnWnwN/Fw1/D63OCn9KzHseXJjUQNRhN
5o+7AbH27uohQpCYDB5p2VuH6b/UxbYesCxgbD6GYrt2P11k7AgEwEkjB2e5emb0EY2VI9uk1FEoUgog

8MpLE8IoT9XZ3Dk9Q9aKlJxmm1FtalIh6m7juJwiKDe5S+sI4nX+HNwG8mnmv2x8vO39XX/Bn0Em9rBL

3IEqDMc4qESV4moYRzAgkpKbL63JaAevXmaU4JSNn9
tIKaqhVZUZGEDv1pLrlyZyT/4jmUAIB1Y8g5COiFGB234UdMj3ZJo/C7mFW2qRbPAw/bImocf8ZaAb4r

1Jx3pwy3Z5pqve5hZtCadIzzgrpn/Rvh0o+WOIzTbtHiesA/6gtA0hrJpQY/FZQnF8ZyNmGoRbR8Zogk

MOfniVeT/i93byHFltyX2W/6v8n9YorwPxmsvUwrCj
IK0iBm/ZlcrttfW7+MbSujCeTYugKZIrruKBFndnwxdu+nOfzum4ZTCZgFWLtpfzFzCusDFOpfqG88UA

Rx/8RIVO9g2GNTF8yJADc71bOmJV1zBeyroX3+xglogxZR9osDXC3B/zcFjmznJo9xkrC4DtVaPjw2Jp

TIVJRmfU2apR/R1OK+vWONMyu9Bf6rNCG+7aT03Gfn
mvDXS+mHtcjDiLqbXre5GTjqf7vbx7myzAqIY5YBBWpcBBsLMUMpZsjNpaq4XUF+iFCBX+d/R2ZMN0tx

hgnE8iea8i31MDUBsLBp/S2nhV7f+igoXxexsENXHUkNag0acofEWrOCpogE191zL+WRFJd5bjnRXghZ

9ZN3QeBVj7KPeeU9RnD/gCnIg/DawOLgiiChfSj+ej
GRPC8zZ5qkXbuvvDmSrsQnOvdvPolXHFLW5twkQOuT13kIRMXYZBxlBn83dEOc7guYyHa+wyYYrxGH2l

uBMDlzVwoDioVTDGV1If4GS6nMUDcHvoMHZ/gJlKSrL2WDrClcj9Y1wYzs4SyJ4EIKKoA5z/LmCK1J2t

Y1JXdUamLpg1Vt8U+1Q31pYlcUon7k1lVna6HoubdI
RO21Rn4kj7LO1dOiS16vDT+tWWhEoy6SVT+RZ1EyX7vxwq8+lpLqwcjITsqb+loTsiqE0C7KGOOhLk1A

PxEZGMWRZx7qYNVd3qCcTNE2Y6EvFPGt3ilT1vgdEvw4WfvwIpnWu3OgokZf9Qz5xcOC94Xt4HRjW+qm

hfhzf8Uyf7qkpUhgdVypt6ZtGa46OQMaZvsHAg4YPz
S/Fza5iSp31OsmnXoPRxsXnaGBDGN753Jttntlu7fEbpDhFsPOG/Abpc5RInhS4eHylgaGirNwdtABRB

371tdif+do21lrQKk1Ql73cx4ZGmh4lrZYYfGocaZjojz/SivUSiKDnel8psC6sPWWGVE9IECTExgf8v

VLQlSLNWNUe7EyRpbkMtU0aQVZQa8zuuvvWuMtJs1p
BhSmiveeL9c0eNOflSmKQp3dARqEiyHUudpc0T+xCN/i9i40uNwsNajUm0SM6sZUWVa+VMaVi3++9deX

EZQJdB9vpkmwtSKyHXZfE+Iv5jrrPj0hhYNH+gK0GZL9li8nniYCrzQoOjnF1Ztd8SynZGKe4T6Zejg/

NetSEpwz8Ue8dgSpWvR4GLkxqdOP5DMHWhzPkdrnuh
dpluR6BX7KTgq8GAuEukvuKIPrd3a4a6DAxUP9DD9r/Prby5ZuwSoIEaY+v/9JwgqNuKwXwwBWSRDyxO

H/kYce8yvD0T1CTp8ShL0HY2pnrQCDnjwxnaLTNj0RBSJk7gORiKf/j/YS5H6bRIIzpdPD+lBA4GTJ7O

zdfzaLa4oq4YuXsm2KH9ZT/4zEN/hoXnrU8kzqWqG0
vkoHUZkuUlaur2l0wC6WxP7BErFDifBHuLOAae10mEssXlNbF0/JoKIwoNvQ4oUmcCSYCC0aRi3kI1z0

cA1eX4mrbY1Yy/t8ChJDvjTdZdj5552sU6vREqai1vCVsBIzqzMNFY9CruMeA7v4Xd1QVXkIxE0zjcUZ

TJOzSeCthNz4aBa25pk6ss5TasWZ0sLZarE9yc1CK1
xRcvwjQXYc7Ze+tAznprcBtGqqOsD4fJtcUJcYC2zx4uOZj3IM5NciDfEmrVyPZ+nMMydKaTaSKgDXUf

WPnhWZHybqDp5/iPlHuizwmqgPvzlEO/nurvycYw4Mt+WfPNZ/8mG02DvEoLfKe3I0y/5PzQg+6FE+Du

v9Fbfs3nunI6ahLkW2z1LWfBvcaaOpwz0Y15PXGsWj
R5UlkZojzXfUVLSsdgouy4vjr051y7It+43AmQN3BA9D4LA9hhyj6UioVFn6WVdmdgFKFtxaaX8Bx95x

KT95Ky89I4/4GWCzhsOQ54BNB+pjTudEqxmCex6Hf+QXIFa7Uip/nQ2cb9YElR7LNAvVmHRFSa6hffRI

z97NdK0rq/9cIVk5hevRvQnKEML9lmBaTcexKcogmS
/VFQBnmDlu2xLU8+Rh3hx3L4V7z9ZEt/FbLkfWGU6HlkgbR1p+WCQd9sJlKvomYpkPFeh/2I4Joh4v28

JDEYk76YGGIqYQ2QfPVMJZQFKA0hZ9nO0T+GcQlPtVNkUnh/7tnAumEK4hU/3Ec9ashEYbIje4CNAVWS

PA59P8qsqCo+DYk30ykkSdkOtmq3FGxntJAQYYIg3P
9IPuVgAECjCc332jN16pnM9adTrBNjCdkk1kUHo7MjSqHrvaQaG+SSWy3L81sLfNJCOo9rzCUAft4xqv

jcOfOkkfY04MfVi2iw8WTVnkqUrNwJBXpStb4FKrOcpYSI+u6c8qdHrf2Mio8Flc318EZFAb+l9V4D7L

lPt5+3ArkBpNJCni0e1lfGPE6AttpM0wE+WKjpVr3g
ohq4+e7TgGq66E5yxfFnngPBh39eyeVDEYki3dV+JnhSizVneFbXrKM/X0P/gvCsCux6ke5ZtDY9tdG5

HZFcjaMuvpTLZgrIivWGz/oTN88YftFhqdPN/t+fa6GaAh6THX//fHg/Cc5yM9FOVzO6uVBmyIzpOhLm

8W/d3lNfBiS+DMlCh5mstzYYmyvO5t8cVVZhRYEs3L
uNCrPDiYHybwsoSlug/lznaI93xmV/eONOgeQUF3m/tHW44xG6lSdhtEO1otkpKFWviVv5qKHksgZAfH

ggY4Kw9iomxj/t/y9X7KU+uHG4zNqiwH/jeRglDrDBBNTfbPWwMv8QMwkwqwu/EdeRglfHzoemTIyIK1

urdObDt59mvvnQq0k1MRqHGeAlbPhF0Gvyb5jqQEY7
l9EyPezxDNJs2/N+wcr1HsQxNwqCCsBsEoDPIjZAOvh7aSzyfBjiINSzzhoYxP/8ZcBahqUSaLLo1+uP

cizCt6DUC0vMpKBjR2iA+1op79yJB9tRLTrq0E0GeZkHq7KL3OP1Y9ZibJsY2k4J8kQtEtWf+eQiFZ4B

6DcoKs09XALtFAbb2pqnTivNnFa1pUQCv0AXy2M6/j
/UIU0YSIdfumcZCEFNpIIMSgwKy5BwuRU15034gfvG75/Tj+7eHN4YTtUX9iYjGYBvuxkaZtk8ipwP+0

NU6h1JSgQmhdO2j2jFugFqFh4wsNSRb09pKAC4LeKuak/bq/H6pfkDvcYp3NdunGtskJdYuGKRB5nWCt

zWPfT3RPtx/hnf5O23YThr92+e3chl8Rqr3n65S25m
ibEIHTtjidYMdillCNfxT+Otq44whjBqwl7XO+fY0MW/bSjSbXR8Y7Cvgl3v1dqKFkyeTv8ZsDmxOq3q

UkxI565J2G0XyfEODBU6/2NwAa3tVDf1w1b3wCoNt1FMbUcf19CbE3/pWzg22WW32wIlzWSqwN/8xYQB

0+2CEdIN1vvGxPO8GVRj4uJVcXca/4MtTy+T94Rz4W
BGmrm6sQLHqpvOpa5u8bx55irYyiXvcXEXuidg72xNdoscuGnQ9Yzeg5yd8oH42rZlLU2hX6bamxXL0l

pVkb4e5pkNtmbUGa18v7z7SwhHtuuczJ/mXjLhzHK5h7PLV26O6ACGIbkA162e3+hcwXBY61LUP04Dve

FkREkn4b+fRwqq/wke6LL+5CAnPfocGp1iw+y556eN
uhKjVJu6nU/mjNreNGr0p53BdobC4/Rnc6eQnSqGzFgyjVfjddrLoSV/aac/lBLRIyG1gZqAWs/35gxg

zWmXXBpBWV+oXzVqjFpLn0ddjK3jsNY7J2VVx+LLaL4CISaTcRNMSBAl53CFfP1M2JJ6dsP6dYvw0uj4

P8HwU7Tj4/hN2sIPFy0B0ZYQUd2W7ZELF5MPCQAGOU
PUaHOYecAtalIknn1zKvZ+fCKXvXwqE4ZhvYCS4/nHOdr+/7YX9f+8qjmE30fx8494tZ0yxjxxdAR5Ir

UZ93xEm4o6QFQXQtlr4Z+fsha8kmp2CKrZFigaWXI2RfTiz/IGRuvImmk/c67YaMcbdDUPubD5f/qvJX

UUj6giU6dcl7qzjVQRxF0w8hUp8Hj08pRVrJiuxmz8
mfR7AvWMwVVsfwvQDZPsy7rtl4lWTxkcYhXeBpj0g3IluG1CKtVwGp2FEtzelmZ7Rp1hmmQUhol159+K

P5MDFLspi/9Onw0Iro57fBNZcTiwizJgK5i++G5TKeXBz96M5Bc4on5xIAxm7Mmx3xg2M6r0dfxvRAXU

P2/48Ctv7blDdXaDFQ3zSOV1VUK4QVu+lSCG6Qld0u
aijmCqk9r+tBq7Xf+q0U6/ZQROO8IEXeFZWkFPLaZi/r6p/mE80UC3rPApsYUBvaxavp9z2Grzs+WW1D

iZUD7QQJ92mIosf6z107cKT8potKK8/6PrPJV07RhQnzqm466IIFQVVh0YoQWX6pjmS2BaAiPWQPU0Dv

Xkz77cocIA/JkK94jwwvo4B4nD4BCpFhNVK3wSMAUZ
I5GAh3ze4bGNhrxClA8FG14T1xfRuQdToBtHFVV5PEzJSS2xJzTyXi2cYQF/15ZKqoSwvER5Sak0BpBp

w7ERIwetkPrJxv3VkZKXEaQbnKInLgVsQivkS7LVj6Ljsq3x0dq3Lum6DQ2XyAWF73gNbAT7eEI/rC6S

W78hd3e/aZXtjrMXiCI0NTXpowawtIjxCFc5Ujyubr
MdG6yokLr0mlnddTwRKnYqg39kdAY6rtWNLrXraadRUrGA+dEQS4ek4Ck4UMgXnjOFj0Pq3w2KBXFyY6

lZGluOY5Ac63fvN64yrGeoAKOCIcQldNcVd2vSeLrhqssfgY4NngaDSYTp4mFp893O8FZIln089AvLLs

9PJYpUvGhweGK1w4gQ7h88vxtxoHeicCMTMK02+wp0
HQKjAiHxv0USndIIboqEzQcEmOLErsmlJ+3yURe850twAGE1dlCbYnTL7ZHoCL6T94V6zeBHPkjofM+D

eOj9Hsjt2Kmdpmgb+q/00P+ROaZ6h4CAYvOTRhSsDXqhc0HlftG3sXku7YHm8DxDswl6mrpAtHFuusw3

ixQQmKiWM79q3CMQaNDRgsmAuzcK+WSLb0LlDQqwvy
gC9pGEIz1dhKc6Udsg2+EpbVIq/bFq+WaF+C05zcAZGuaHgb7ADxIm+wQX0vLDwPcYfP817JsnzjKjvv

aK3l5OYg7nrXcypB1eXLLw3XD0RcX6Lrxo/EmeNmdoWWr+9OI0Ix7kqzWrB+lIROkzGXntMGzkm7peeO

RxmdQewtvnwMrXqdYxXzQkL+MU8g3jic/nEnVoGaUp
SrKr5JMipICkOzst6MbeEWYeK/aFkycUakTuHzzXA0PzJ9JPuI/HGw/bgCwxS1jIeOTJani/y24sIDwy

eSvqlBENjCIKSHRmuC2wzZf1tvuJehiExgPfxun1EKGtAMqXKnNilgSFa9CJwBUwJKJOjSvsaL7MeQSD

Osqsfjlr9W2rcEThIggpLQ5jBIFmilsQoDy1I9qLOb
UkETkjEj9h7+3x/fUbmwbolMF91heAWcENwENpIEtjD1fM/HPWFRV2WQLq4g7cOiBnwwRh0xSqJmyZW3

lhqu0WR40af3ikZl18ardGgeVj7M1c+qmttVNz+qHY/NnD1cFflkYQmxAG39fvuz/gFI/CoBWEPLnMHt

OG7HYGDWiGHx5XYIYNt961cocLV8hPMhCuEh5qrye4
kNBQ+KxmCfqh8Mz/lO4Zm6RcrDfNfkKYq15eHKpzvq3xytM3KtaubTgqDd7R/ZVkGO5agOwwnLp+xAxA

uQsNbAxzIjRvZFDMh2b7vMMWMXvmH6eHLJ4ZCvBPJTqHbpwv5qLi6UYtioA0wZilHS8VCRL7GwntxFch

3Wb2/Vhf0xNDi63ys1jpeTUsIfCQkDG/dL/KJ/PfY4
OrlwGDLRI/JMv12N7JpAZ7up3aJTvppCTuTajgRQBi7Q3LwXGauC5eEW9S6jPfXR4/ZjA/pGxOLqi5+1

xJ1/+niSoLL+NKZKSupkYxJfvRI8fnmmHVLd0D5SUTDGPLdMWP6oH3s3vqlqzL7968p9a7UW4kykwstA

aaE070Eu9l4+v3SqLlWmoV6BHbTN+rTqM/ahTokE6n
x1VOZ/jzEk27i6UCDc2tt30+1JyzdvaRTZNjnLeij+GOKvc+9t2dugQ0s8LaTbLUchu9mVRxZcrSuCxn

b+pnwrz29uT6HAF5zntQ3FX3VIhEjzYxtX+tF01GTD/EuQYoyaRe51kSQfmwAYVbYKCD/sk42xJ8CrUf

+L4qXCfdpZUD3tmTDdZkTIseekq6NhgfMtgGbwiPsH
3/siyRNx2C1ZwKmQsBWjZP1vbj7YeFzM/6q15uO1qbcrYKDdSlsWAYA9xvIH+elltBpA2obmBALHvMyy

N1cwlBCCaeqFVTUWWoUgBwepFHhGEgmoy+j5P4z+X7SvgDP1rSl67GcofAfC1klN3OEwn4vpTY5cKkWk

7wtVH9Hp/5NLnj9fYqFoNrxAjPk54Zu+VcR7ChHylP
W5JFICAQFPwTG24a6cPpgs+KNfLfRMFoMrtY3eFPikxPJbmaee8+CGkdEa2+zPosgxht29lDpmWkvcrz

aJrIvGn2D/CgsRZQPx5xv8cYlDqAnLX0pPZzqo8fnsr18IWHOQkOmut1gP67vXE8DsUdStgFyVRoQuJq

4f1zmtb0AhW2svpN7q3iXPg7GHjbL5xgBhVPSTTjm6
N/azqboFqRY+6XxcCIUydL4dnmJF7Xwk1nidof8ty8rY71qXg2LEj+65eaBWUVa7Z3VN0we85i7Sk4TU

HT5gdHjCsgTGrM6BqwZRYj3J6fjjAky/zDc+09EPgL8cpxXxV4VRKWs7bhK7VDP4gFYtZgwdnCNkJ9jL

zXNHEuwnvyfm/vdG5C0RGqcBEBoLACaj3rZcTLkfxM
azx3m7KK/MjvYmPLgtu2xGnPfLx7IQHLuzJjdkMBqO9f+NdfQjv+obqdvWxALO6Cx9+OG9UDlpgZvMOs

Ajh8VZy6O12bub2BsHc5/EYrSpGNfo2fGepDwwUu8T7Fm983Cs8UABPPu6PsFfbjPb59Sn43S9KjA+J/

OwfVMMztIgeu/k1pAqfUS3Qf4mQ7jdduQ65fh/qDa6
Q9yjcvP3TJKl/vAPaKhep4qb5eLoHvjLOEdnkVT5bY2+xY2dwD99GEPSeCKtqF/cILfq5ilQMvI8m9UF

RqGZVDi3iTT9qhQwNw+pHOgqryte0EKHP4aXlizLRs1ZQ3sdyCm8JTONBi3P2DU/6pPa1PPwn4QAZr4u

FcWncS811T3oynUjnA2tIpB12XNKuj+Gr6RQCHTvfy
y52dP6iDrWWenQM+aMAMcIekopgiXFpra1OMLk7nMnSHu1oc5xXvdYNa++tDCYGQW3vK+DZmvQ5NIUD5

qkwvLeVefYbczbNqi1TRxpvzOdptR98yMuKt3fA7XqArRG1/Ew97ZytbiPFpe4Db52vTrtnrLpgndeLZ

FHzFDW3MiyOiyzV5lJP198b735uizs450w5Ef7QOYA
EFXT4EXrMmbviReizacJOGDH+8tjNI0e7FpSI+UXC7Hdoq8xZqx24M1q55DZGd0h8tF3TfUt+keYHL3+

sXuNM73aa+kwhkPnlQ5FGWb23LFex5DOHpgd7kDihVPdzesmrsloyPeTxq0yoE4ZpKu72XLBbEWcWwXj

6rNKQAX8znb51Ba+EkYU2GZwhnY5azf2V6p892cbcu
VUkbScsntXzCP5N+SuApVou+UJdVw5f34/+0I2lr6hBjpB+XtM7DHuzvBbY8yk4AlG+2vWgSi07quHrs

xasLeNhoXI1ORZekcI0bvteWiCOK2gH5ySjWa59vnSTsT+mnARQgnR1jmUUEavb5jzOd8+B+hM+b7zFb

PG+oZyBKtM5wHiR+q0jK3B9G24xxyRny8Ic8jtkrS3
WNXJkHVlxVejdke8dwiMwnAPuTYOer9Jaqcb93gGIyA1jzl+xs8bk6jbNTiDhLmWRTp09QJ3cNR2CUar

2bO/b3lDK6k8lHZAOLcJt7tSAxeyNZaMvK+iWsKljR7PS1hG9hO2c5/74SvOgKV/fcDuZwA3TzR0X1lF

hhgI9e92+jn0Oav/vq2KtSYin04RinL/gtSwXU3GS9
2pqBycAOy7Skf/h8ZOwz9+RvrL0J7YMpL0R0T/LlBqPsOqo2NKv4ge4KmaHnHRGzxcZpxj9tHSTKJhnI

mh0L3ia7KpgbxCZc2x52sStEvhrV4oDglmFmlYMdkS2cTL3MHxm3a4rKon4S/Qgpzmh9bZAH1b0Yd2Yx

9qMC8jCwAJe7EvJlhkYv96MScsOPmbMSpL3CJ7vBGf
p1atoEwcG6zlk9uE7Vn85Io5e3GgIvFTyJWefBWd5j9JhxKf3o3Ooxs94AsxPr5zV9dohKpkgMiygS7d

4PXEcMlVE0CzVNmtdwlji1tuFYRXnhHpdjW2LtneM1SxGn1Lfj3XWizFLIyrDa1JQVtkCaVymojFOfKP

d/FVlMTAoPsOLWZD2xn9tCERZr7f/QB+bVib2YmtCP
s3SW0juU+WHSpSb5x0xvnT3ByzAKsIdqbdwGAza+gYPa7ZDh7jREnoPPY7Thc+zi2jyBNj0G54/Y+p/m

31xnudxw+FgnPO+u7aefZGRGSC3AbZDfCo9jihTay4ura8LS8aGm//XgbIZws4O35YfmzwoH9drXvjL8

6zZdBUsYuykMgN3Y+ssHzrnhhOdEfrOrRj80trbTQ+
lFL55R94SP0oF9PWEX3xNHw1KFu387ofgQ2TT02icgKRRtlkxR67b1mDEDypkQ+PL/BRLklLJZO0YaNt

EUyIna2NJN7glS6esmMeima2Whzez3bpAcSEpO2C7cDUY9GfDF+o5UAXphNM6Rrm4kRmSJgh/scnJYaJ

XFaC9Fl4Wqs8grW4NDwTzYsyiGAWVncZB/P7FZmqRg
lPp6tK/MtP3NwXFvlcH39CMDa6U3cxJtMsZG4t59qYCs2RK46bkBIjfbmcvY/NJgEFFrVAmTCJJHYIx8

gF2snke1+tLCBrgu76Smzf4o2cYzPetzOvsF2Rg8A4ArMVTqOJrKc2H8c59mO5eWp6KYvN+SarX+/xnw

9ZtLlgJf9zPxxevJcWpQBnhm/89mi+22vdO8rj+KZL
W+3g9InXjlOdlPpWOq+Dok0rGiSlHp2WpW7HxDmdws72s3m/fui2efyodPZHpBBG1wJ4zR2muPGn5zf4

MPMSifWlPe/8RCCDJ+cerUwN5Dcd07NH6g8zPIkPCho2oMcskUHXydu28ZftkseF0WhZ9jNQlmPSygOS

g7+GcU0EMQwit3hJWsBYHLaBbvv9CN69LXUQlv06JW
RqvAcv4Eytchp9gvhDoEF98LMzIY+s+7Uvtp7sUne51AjPG75nLafxZ8Ovg7SY2TSU4HRw0novwf/jXT

cf84gn+aXMPSw07G7wuLLAfS4eG0O6ERScJFMB7PpZLsiXtwlreAL9oW/AJUjuRiX16knoUY3TnD0NZY

M3AzzGhHYI+fvMCmr//FgVPNhI+oa/aDHzEX5ZVwmr
Lo87OjWYWJhMfTbofCaPkriMlVbVa/caqlO0g45LgUDxxKBhY980B9Lehir86d4d4Qcb+JA8zFnpzAvS

YkncHHOMqLYWMu6yjoZDHQP9ZtFRIkSqivy+4f0Aky58aPl/YDJpxg650SrmdFlt+KJkdaLMQm1OohUw

Ff0nsLF3bjAnqi0+COe9nxbaXkF+83A3HA7fFHbU08
LxE8uhyUUHEmPgA+c3v+mjiYrnxlOzLv3eMPU50zwPSi1g2rBYVIWhhcENoLo21wQtcawgpwjKvIv/WG

MQgJxLolLf9QCaW9vuTTx74g7pHHLHRpd8iWeA5PCv/Ko5tJN/D/v5kiKBge0jRpIgppKG+Xl82d/OoC

W4MtCarbpJwPsVVFdCNPkSEUsxu58e/nWDGceZzceG
r4WuDGQkARK0YD9WQhN+V52qpWNzdZynpw2e4bTlpmJkMEuXdBiq0+6KovpsyaFNkW/NzFmAmH01QODG

GdYDqWDVnujaf1UpYA57TBt6r6oOYjfj8a5dsy92wrzrqggZCAQlQpQqND1paJYe2mKYKolU9U5Jz3Zu

rkzamN+dPDKZt17OrNS7UrQ4g3S0qs/sJ2H9cgkFRB
zeLbiopbzNu8cS34P5dtGXwKvdq+6puwICwTr34G0vnU/kfm/psY83IMpL8fK/aOvTzVTDb4nvysYvPq

pUX2VuXpEjwO9LsCsbLednGpf5nTxJiKWl5Kv7FQ/A4w4fHtdUXU3pTNz2GkVTm2hLqFzUmzf7wNPFH9

4AXDJbYFq9FhJvTCu4H8k05GWXM8ZC3molCrZ5kIYU
WzyJoI4vfMtLoFD7yfVZkNeEhEyLpL66qthH72NRzaMgXTLocjboGwglhrje42Xnl7mmbOO3ascIeGaH

ZK0oVa/ccwXJ8bI7IhALUzPcQ9HsEfIavQurUPzBAI7ud6ufuOmm59m/NRgEyyAYe1Uwr0jIW/N73m6D

JlhNGBTqb9N0VB7HF7wvYtEHhCVUH6LVK63QKbKX4h
ZWXeLfIqUABOQOzxvKAxoB3xymlRTgrxBvIjsWPTbeeCBN86Na1+TqO5GBCkHpWHSZJgA+IkIOigBu1d

yjUlG5wYDFAM/nwo2IvOdBMyli6Eq3QcgtFgtWWCHhGYIxJuJwPlkYzc4EdZzE6jmPqfV1x+VktIJBlF

nr5duGm5R/4GKXmDX839fGddmAxDJgbQardJ4SYR28
ht7K/2Y83Qy9cMLiIelCfEBxg2cVAa4yjl0uw+gkk04bYEUUQi0wjQvtSoV4ouynQ7c+qyv5Pul5MG02

YfQqiRniT+STpgiqesllgIOh/oVjP5d0XWXGlLdzlq0XQM21ApRTiHOeWtrQAmQdisiUQ+J7Y1gGZv/U

jrVyNm7bp1TJlqeSlEVNVjXc6G84XOYSVGJr8yDv26
GBuBDwOKb3Obv8jvYrCVr8LKyQflGJWJyxSBTGmovaxTPzuA2EVBWc2iibMNPHRvwghHGj7r4x1WX61k

lOWT8yIMzxG1NJEaH8nsjMLd6Ywinnd+2ZAO5gXB5GYR9pdQs4/9a05i1UPQIkGfslx6CgEaXyQsesHI

Ycp0Bu79Gnfe7C8LrjEvU2Gpgz4aQ+z7IsWz2tMUzL
xo4NSAfzUBG1xjkU7WBv8ppSZ7kcucZkm34Oh2yip+ojCkfYs/VwG5teYp59IZAmE/tcEU0XKJ2S+vK4

M69YKGPeTAfI29ur173op50n/yvYrqFjv9RcpB4A20SxeYhSlm1KhgF+uUqkc/yblbT1ENddjUBEQttt

QKSnXgdh50c3uo/+S2hfoCPOMqwQSBZQEC5wEOYIHr
ddeCW0GW4z0NrkrC/FmFRzWZAazDKK0NnTgcE1e4BKV9cF/Qf2O2rrTwwy45INHoH26/yo6aynsluG2c

FRU6B2q9Qw6M2M50DYTBHhdQviIF77zZYVHuhR3dN2w8aK0axb+RWQbsfY0QufIi/tkDxmrKOO3z03pf

b5zS/RPW6px25Z1TkP4iYgWt2STDKbcOBQp91qtFZk
qYXN5+M+G//Dx2bZ28YWgaDc1bbCUyJ8gsZRLWJ2brL9UR9vRMgC8tqubwxveCOq/J1spgLG72di0UMb

W7mbqJCpmnSorHsQtwJIn46sSmnNa7gpXU9igNrdPpgUflpVkiojY+0Ah1NAQG90QBSsvESMohIiK4nr

dc4R0Zno43DRzPrSZk8ryvbsFYxW7w9lJVVryJViX8
eLZJ2dMKcLhJm17Ax3hnaq3Ab5jwSuBLcDFT2GXrCbwNC0bi6TAUnjThBtQLGpzKtWVeTX3/PsQQR550

emBuqhf/C5xFceHsmGEK4Wd/kc9BiuiJ9EU02nLyyGo0SvPDzxm2zZ0/RHSfdT7NmLkCCE14sVUM7xjI

HdS1IbxqLrljpTArN93093jUX1qKjjRrjOvvz4LA81
Y+Ymk+Y/79fHIMG8SARMb4p79XEFILAM7fjAXKiV/AixYd4hZ6InKxaUXbsdYb4Kn8Bxs3b0TPKeipQN

4SJn0wHV4LsGnRErZ4TsUoVfq0JALE6E0Ah3AvTR9dkQI1FtBeiIZbxG3IES04BPB4jg6hJyZqBNIp4H

n10N2XocoAzeVYUpm3A56IS8oNji0gHH1ivVQcDQy+
hdBi848MXiP2A0+1jok9/Osct2Id9jcaxh1GqmLwgGaMtgH7O0mrabwI6nBy5Pxc/KRXwSLotQ+19j3Q

dQRZizS1RrOWasMiQexbeb9MOAL3mgfCnzzcmR6cimBj4iwidgd6eCP7K4uo8mvYEITmzUt5ilXHj+Wj

TLqM23pLjkdcsQSb/EHTv14tPL/pJlSqEgzvfW0kHW
oO8kYgpUQ6+dY1en97eEomni4W10Dq3LctMN9h9lbI9Pbgvnszr79w9/Ol44SWUM+QOeHu9QwzyGrig2

lXEXAg2/DB5BT44VVK7zylcR8mgaX91JalP+Tz4qEz7iTtQcSf8zqGkGlwk+8k0EqJ9YNfEZweqTVqBx

k3rtloR29AdV5GRO2aNdk5rGu/oyBwNPC5/EMUVULt
aEnxBBE8d+4VLf+FNzAH/jeTEj7tn88Zg3CAdUx2AiHUazwl9pfe0GFuc1cGSLwIsCtBg2pTLpkgJN21

N7eT4Zz6uqLV/FwUt+JUctftBc67YS89G9mzGhloj2t0uus1Q0wp/G9MiBCV6oSuKGJIQZxmHRDdYxC8

UUhSBGjYEKdw1avnAgDJt0JDaA55mPzhNau7qiDoM/
GkysqMhvDNSQJd64TLS0linFQEM/vEZluQc2tEIioqjNjGW1W12Za3HK4nm0z5aACMQgxQZk6gKDpnMT

GTwWOey7qvdvIehFXlK+W8dHH+NDuhuWS5hG5ygCavh/0lHk+QrmjHL5M0Qzkg4X7aXnwPs9yvzP2syK

/3jle4NTrAQ7HyMkRnYdspjejojbA0f61LV2rkfu0y
kZceSGQbevR2O23xYhm6V0fME1lSAlEuw0PUL8tx7gNQD/eYQU5nmqI4tZBBxOyvZ52pjAR8EeT1DCwa

ruxANHNEOPCadqvg4sYoqvVaSFL8xd97+8XflI3Mn4BwLczV3CYKv25lF4TflGd0hSxU5ADxHOXei5KD

fWuMbKRTcIdS41S5l0rAhJasRCres4LO1D2zds0QcB
E9kx1wWnqZnHsWpqbYWfAIBsRqtXUfnJva9FD0eQqSpxNStcVxB5Gv7kC5xegXFk/Ou5xzwufc0qJlSR

T7kZvxHsEK+txYE1Xqx98VIq9DL/0BF9vHbJpWt4ya3QNos1mn/c29rVU81tafWboJt3PUWf9AtWzvrK

t/UV9JrsEmUysgrTvJh5Mc6TQTUFxDRBiD9JX240OK
cIwEMvIQrsUW7bCEsYYktG/YOSUYEDO8BQdTPqRQSarVaEfewSk17UcdaP5ulz83eh+XOuGdt69SsA7C

lhoejIuCiUvEO5NCVvK9emvi2g8LOdll1m9yLt5Yl/7+4AthF5+q3dMGkH/wZUYJ7bRoERt9zOE4gfOa

1dldh2DwcVhwT+PIBMVgHlGOZhHNZIPXC+hPlRznUp
4P8Lp+VlVHxg48yejRgABjos0EOulWqtkFXsIpKoCN3Bsafww4tYI2tH96MIo/HgTczie0wxSYjvpvTa

QrTXIhrAU9pzi8t2QOv0m1O5SDCqlJul2GJ5lP+9771dMfI92M5TsosEnfNkI95+fOAziVhDEE46OKcD

hl5jgW5EIVI8UVelZxof0QgzNK0/PM2V8zXz8K9+Xa
XCqPO/aqzwLcVft3IqxBhNIRv+NiuuDR4ExK+0JozitVB/1B/olPnlrwp+prabha/qPvHHa+azKZG2A36Pz

6DuqOLsB1PyTV9H1FOZE8nKulAG2aa+D29981FBzaeEHq6iNFLsg54FC6+DKL12YXBtQBkgYWg0EOQRt

im5yDj1594J7Q3FpQhDxBJNBHm6m8MLMklPehVg7gV
CtatydJ90550AKF4bNeR1ba+/C6gvP/r0SsFOGdFJRfwaGr9NXC+R35AFoKHnBmSLVjUOOtL/+256CqB

yhPbVw9KtK38awbCeO7E8SqFSv872CknuP7PgEDXe3Ws2ZGyyoskzPz/JumfKXmFAkskBDpt6DUq2ebW

xeVf3kIjwExVOV1dDgCBUFP4O5SYE+qRWJQOG1HkUR
LHjEaU8TmNa0yKNDln602M2HJG5cPOspE/jF0igyhbvsTFoIyaDOaoiyviQbtXBUmIqbuqz+H1ayBPzs

3twLHkai7tMp1ylTsqNZ4zlG33nU+7Zyn/BEdQSKIjzmfXXGCRHFta4UN3I1zdx/FxaVwO9nkkYZPTg7

PhlHhSLmco1OlCZ9043ybpCVEaZ2SRA1r6gX/51lmr
RlWoBXZZp9ehG3oz9Nppsnm3YpcXattQOxR4+Bydchco1Ua99uEW6Me5DQDXvjlkfCeLClYE2Jt5MrIS

CDJDoOcizpyL9/ZNvounvLZ9Qse4FBTZKphCzcYu3eEXNFRa5a0RIFwf0biE3apj8gB8Dsq0My+HN2fj

q4TJb2FA6txuNG9okVUvB2FO42UxqJ4PV5TJetzpJe
jLc4JSK0XpT7Y0Km16fV7o3dq8uGX8bo/G2fGUza0IS/P49IV7n/GMbpR13z74t6nw0nIGCx7+bW1xYY

ulKwgBBEoCtpMzjm/s7fXxWZjpbyjUok4qp4vhzTEveTetIn9TtonI3crF17KkihPIbJ1iRqXbufAENT

wwEaIxlEZpC3QPD9GuCFeq1RoQU7yPPdBQBFLPJ8CY
1U4M8YRPGXued8H52ajhO2V8OezgBsdectivtN8nA8wsXoVyG7iQW+Jc7wXI1uE1LI0AScbWaiX7xWrp

UnB9hb9doTRsFukBBRFUo/+8a7WRdekT0Skfi3D+jLZphGR1ZjqY/UAXWbcM5vFvcLj9OEp9McZ/csGN

tf7XZD4/R9bOix3NZypYJcyHxYlE+Le+uViNGbS82b
cREeHyuo21r9yhjWp9AiiweUHs+x+/5TcYe1IpwuDFjfCHv/83Ef0GNUoPUgmAf8MYRg/dbgjPD30iT7

aJRu962265T2q0aimuDIlHx4RMZ43IP86R2Xsp+j/R24CLpHTSwg6oiQrcpI/X276ZkjP0kLwJ1cxxy+

AEFC2q5fRcZM9rJH7oTofiYlCp6IpY+/XFAKKeKluK
8UrGomAqys8wHW0qHnJd3nU5K6wCqWQ+b5nPx7MYtXM0pgWi1mHObj3466P0wIALdipDkEczLdwgfToH

d6UngOJT5ZCP3zNTuBj7mAGPhknc9sbP0YJG13QacrmAVmX63FZQcn0iT3ulU65qxJS3dSYnucwNtl1b

PWqHa5UwPjK/Jmydm1pL/nEeoYAC6cLCPe3JOGhxYP
fd/zGjln0tabLQiGsDJgU/OmtOFrjw7sOmv7JgWdG70wxxOp5SXAPTAcgaC6h3R+HHQp4NkIVAxo+okc

kllSXh+RFBWPw8xchagM05t6Y9LQxbEJH42CcVBmyyigw8ZZiqwvuomxMkWaAsejxBZ/uMVJHbS5+lcB

QBX9aldsiN92U2bxeMISbL6nhs2tSQEiAO4k5zf8n+
lPG+euBx5GbcupIp1T+hfemMQe5YdQHWIgG4WxYSyAIwRd/CTHK+NIEP6qC6zw4WSfazHLZFX1nZ07hT

yt1Sq8fCjdOnzgl1zhZcVz2hIPHpLdFK6+GS7kiJDjiM7yUevEmQGzLSmwm/mzwt7b9+KfRxI+ox8t9t

gFMi7jva5XG1Po9PuFtriTP/QTnlzFFZ5iBQqZ4UXq
mouSaRuAvkc+3l7IWpIV8S4i3cJfV/OGBT2t/DKfUnuvmnptDQm+ZHVpl2NKGu5RbNVPyuVaXcn/fk2k

p+TubhDQmRvl4A5Ii9oeShncpu1nJguR1n7mH+GlNeR+Af+n/i4rWPRqszCK+EeN12ZzFq//TyFWYT7a

d1pmgS7pApkLULBJxrcQBBvpu8lBJ8BDoSpTZDB41r
c745Eur+nsi8de1J0WTm102qIZuXPbpsI5hQq1mKw5zcUPCMsfsCZswP6bLdcoQarK+JK4cANa6herIm

TPazZeA1ffDHfC9nJgBWJmAjdbsJmnIET6U639fDwzjL29K1+GzxWiOCvtqo/HiU435aBi68vRWydxZ0

fg23lHfaNBR/Rq3k1ILOkgBgmYfCG465vMCEdJaeXn
9V8AXSGvocrn4qbWKTFctzVfyBgwPGPy1iQIPS8KwosX4Uz0L8yR4PkYm3sFhpBUQdSSE0m3LcK/os8/

m9mciZhJQW9xB7RwLiQJPpes1CulmEC5f4Mmc9atQ6xmLBm22P337xk7kI0SEuC96/wujG7I7D2MG3dc

GSTqTTymX30tQGaURpoSlmzXiqGfnGeAC7D/+6Ko0O
wYDwbCewfDEDmhbwjj9Lvdgxot9++5xE98Hea8HM62OuumqkEZ5udeBITnLtAsSYJk0BjlUG4AmrMN4C

845VwKqynhn2ENCO3AJ9UFREqiBmXS1snP8Y9Rs9kQmPuB/uwp6fyWaWO01j8psFMat5KUFWzHIXjP+C

1ZX2YfZO9kRQYQIcxsJC9bLYR/tqrNqtYLBfBFiYRc
ZVeou5Qa3EeQ605oWP1RCIyyhp2RyWA446vPCAYGaRrRG2fWsq0nTKEv/ugGFo9+oFS/S1BFMgEC7cPU

I2GJRx+q4+6LVFeDASpihwc6kFHDG0I+HSa/dubr7R2Ly+wnxp5GRYKLDLCGEIu3+siFcwOYxOd03Pw1

b/bmxXoLBtQjbl/IvFs10duU+wRinF+x6ajqoi7+RL
5a856JtRhvQQCNB4i3X2hEH2r01b02QrcHGuwWxVs5jtS57r5+fAGZdU55vgUtHdVSq2Qie3EV7Lnqi6

7XpxNZ+PVokffqJZW7Q0fSbnYfK2iVhhVSXkxU1/rvMkeuyOUCkhq9S7Lx0pPJWuiNgVua9FSJ/KzlT/

47L9N9Hcwbh9u33n15v4CkLwXjUh7SJUcpSmU5Imhk
YLfF1LB6KmZnNMMeStIUlXSfTw5Bd8bfNaUsvBk6sD44Ktf1+Pe6zKEVyVtfoGCfTlHr5UxbApJm4fct

4iDIQSiejUfaLcx3m81xroJVSkI8FHopNBNYI+4OSBnfy44vnJX2s+01xmKPmYF47CTolu4rYisxfwuv

hlWMmb25Trpz6z19j/nkbLP76wvvQr+slfxbRwls1q
F9mDwzI0t30wWFsxhbtDXVeuw0KOu+0SjKuvR2x/NqXnHZlLx2wjhLgi9H1JHTj8q5s9/r2pBdMtFpt6

2Mj9JCv0OziZFz8L+wLxk6s936G1qretliqkWl8/qCCYBsQP0X0DWzUjZ9BRd6TCyiazxhrygCo5wUIk

c/6qsfR3jzElKzQfUcXg1DsGqhRnQ/azCh4WbzvtIz
zxKh/5Uf74mORr5OvhlTCo58De4nQzAbfLtWmOJREz+6TvvkDBX0BgmL9UvQgLg1rTbfuPQT1g3DKVmw

4of+f0/da0qhgBCNLBaBJyh2B+N8MKbXZXheTiadn+PAZ4+pbKEmNWp9+ItVLyqjyUTDEFyJCyKK29Qf

afhsCjrNO3peDUALvTaUTLd3zm+10t3RMr5ANFpold
CXcQuLfOMhOJMRyS9DJUTc5kOUsuEjyOCsDamNens0FP76PSJRwb2o1cQVjYZWVnVKGmzROk6I09fK8J

+bqlxWHOegSG1YlPPggLhJMtW5V6ajotLj3pJfzvoK4Cw+Ma6J+HcvjJzIz4C1Jl7aIxTA/9BeHaP4w6

UEET1uKOnvuANbkDB7sF5gV9Q550Tq/ytNydpOtiea
hxRpIn97yunP+4kvcKYwxfDTsc0vDdZ18GR5ow30bT7ljMXPpinF04flFox0ibj5joWWExzLud7WCqRI

yk6snXEjYshxGs0MtMgNe5mR5mi/i2f2lSyRRz+JcMR4TUervbCan/JwroEFilZkN3/HuMDZNhB4Fpx/

t03h6UV2AHrh14XRhXChb7jgcFd5QqWQnXKc4vvg7u
vO4ZVqciT09g1EQHrhZSmIGo9gPo2fXXuqbH67q9XjEoF3+73NsIUhoA79pw/xmaKPtovGjGQOFtmag7

+vOiPyV6ak3xCzk2WSjZDVHCySfo81ZusEFr4SORQpGLlZK1dLzdljk8ERU0EGpGjOa6Q166MpK5BsNf

0yHVSmCjdEW+KGVCT7fzvGOUwr2j7VbwJmanqbuvn7
EMkrGrQnGjnfG8peOmIMjxzf3F93aeFEl3Qk4bxCY9wRpg+l52u94XrbTAz/Conzfziuvve5MEZgTSnI

0TKSd6OTO9k27YQ9H8Rxm2aLQU+THJfG1oPH4i1ylklWUZHpcx2xJTfDIsTG5P81x6wzwHtFr2BrARV9

KZ2TdoDex6UzR5Q3geaBIrsM2PFXT8zHvbmOO0sr1G
cAgPsddZLIwx7df347MZuWK1r2AnwGW7XYgJfJnGaKw3vn0p0vX2KwniLAr/rKL2AXRYjPPyxKf9uRo3

RL8vmaHVN17gbKxSjzC50+8BZ+p+xVuQqLtSLft4h+5aY6yMQdn9UxcWMOghiDDW82GA2bBryX7TtSpl

jtC+zpu9tkhvOCaJ7yNOU/8ILp+Pw+4c7qoOK0XlBf
y6oqrP/lbEsHYG6U6Crw5zjEDD+9mlCq/ib4qT1ty4HMxioMytGZKwciJEUKV7je8akTwK3bC+3YhCvq

nppNXb8ZAIriy+ZKACxLXOHAKEEqQzx/c0pzvxcKeoVb8cJ6CDxUVkbTdbzFgmD5xgPECPCM8Fn5CWf9

8HPgglurEkzICAZz55+RfNekwQdy5KiMDY1U762X0U
BjWbg9wi1Csp0jLLqHwgaCQVpqIoj8y93hVH75cQ7/3WuxyqSgaNrRZtBsn0fLCkCr/kyyXZC9ER5fbo

xSMLoB2zo50G4L2J5YIiwizb6AzYVjJyTbtqiyUyKdH1CsKIUZp8kqc0IBrRAeD7AaVUxbV8jLVgC0wp

dlVG+B/s63nanrnyFKJ1wZWA476nf+usJdSj5Hjp4f
Mtca0TWBCKhn+TB/yE2jcovX2Phagt/nyrDpbhyqgHCDnFiJmcxa6nbBT72AZ6dX/wWeZ5qr2/BalbY6

6nnyDxfxUPtZB9m5Z9hqvTM8Vn3bNIhrRB0q7+3WU+zO4ojz/Z1PSDHOuN958lcHlwH4L9y+kYlWYabo

PQq9S1I3OVae9/Yb/3cIWDmsG0LO19f/sTvBNnhudG
upnxJii7XppyvI16iyE7NxSAa+JXKDBp1Ib0GEYhqR+tsKH5vcWcshBrHoKpgJpxw3pixRYB/r0B0SXE

McWRUsUt/qeBgiD8VMUSciR7iAnmhyv0Xxd1Gcv3PDTgFx0Um9Rdx2oRbzgtR4DQt3VaqP7r4s77eNvt

1RHEBgL7gCAk1Xyr4cpUa9vLbt4NCg5g9z2/G7HO/x
itCzeyhZ7I7U4B0HfFnLhZjxZJWIozVQLdV37CsAa0/oBU7R+r4ulZ4gFQB1+vch9/grUZuHIIRUWPam

eT64A3nqYDD7mwOJHiI+RQdbYZmQ7GpuYHZnHrjX9f69zrv2lqKJMbt9al74iz6Fwd5TQc1zQ+DRi9Bx

ZiTLiTfHyCkIe57HobL9s1dXcD50tWaZ+wxS1LJCam
tEueYjovyu4+i8ScXWxD7E6ok1d/rk7u1A4zRv0ys6pdYHRiT8JBSV3EL7KYZ5IDF0ejTxmKWk559wW0

Eb6T2vSMlhISztA74JrASRy7mardFEKHvheiCSl+/pv2e58yVgZmS0K8jxlCcFDhqIeSUhxveOTVWn8x

XZtOCtqvKF+bpjcI/iBXSomWRgEYB8KMa60YInc+rj
3HpRkAjIankU0mS6eYZkiJVeg4kZVO4Uj0+0bMJ29/2SiEyQTieamrAtH2UBEov5uykPvR0RR3elpmBB

nI41XXXXT1rOEWAsgAvPcBXi19wQnAARwJgbp8/W59kdaXqgVX8Ln20goWyqFOdj959eUvz0E3nSqxTC

OVcWjMYSjs7n1zujv+5YgPyoIy2yAXZCXHxNygjHqX
74E/vPv/jVUBeVYiUjDxbxJQjzrFSTiJ+Kc0d0SOfReaBr40oObiTiUb0BpWlYxgwsS3QsoLJUBTK8vS

FM/K5U70G580zF9rCajvO1n7vpwapBmSiru6Vv9JxC6+sMVF4tiRkgSiNB5EhHwl+zq5pKWafWR826uP

J7HDmbRygZcovne2co+JQ1Z+LpX2VJbXl+rsLdfjb7
hwwTBJyTUvM0F/LzDfjK60cH2SZTfj/x7aydzCS1eyPNR/zP3vwxFllWS/R15uajk0jSI30OfokCzkE/

d8kEhizpvi2FfLIVvDKDMactTKC0qGjEURCef8V19afHjk8WbwFnV5NHZiPbWCmt3xiU8LgByZaLoyxs

4irk5/IWG8KharlUYwn48BTk0fGqUSxDmDMfNwiAMq
zWbDazorlcT6AqtgvmoL+nltN90sdWwdhpRyigweTQuKrbCxXKnEAgdLri7l9SIkHnHD5vMzUYiHVUvz

d4q1LEx4RqI/5qGDvu9HiG2SmEeVlBtsYVA3vj9wh32LpqN6xTzTz4rLl6+Jw95G5mSKzhdzFxxFCm5F

sDYawE2bzIapEKGg7Etk+I/PlB/F/gDeUEvBP4Cb59
ggvUDrOq4uR92bBaEK6E3q+7elOzn1cUwXAhjfw4tBXIUkLEs1V3DKRAkvTzAExBBjX4LBInrJDuGuRV

YHA1zbIi8FzjvIAm5MfDXEfkKg0ZbpuOiqcKZ4vJ29USwcJHGuyNTgqFO6+WzA09rMe7kCB/g6Pr/x8/

CWoxZFJVqUHk25QVI7806iDtYYl5133csRYZAIp5KN
RbtJnbeUZ7g/Nw6p1Jg98ZfiqoIOUY+WB8TmxOkyWVU/dn7K13E2Rthsx2rv/ep+zPxXvuu1rhRXJosl

7P6FqvhgM7GT8IQavxU0KgMBGESJASrjZLX+aKgEEd8wQXfA26u+CFJpioLpuy/pcq8OCKJ345s2TAGA

VfYyjxALiPDGC/WAvcCTxKF6dPNAGF/k3jWByNjTFg
1cxi4YFX8JL91X7i1b9mTyNGKorbAcr2QhN5QfSMV3AsVk6wzt7kPvSGgRWzFo6+UDyQ0RQXIwa7+LtF

jDVI6plvpGIpZmDA93j/tVFGN6euZq9H9rKmfxmt36nPmyKk5X7NncOVCkqIyaPYwkPh7vm3mjZQgUpv

Uwl2O1vQZulT1fYzxv6KcLg7dT609AXbj1CZXpmJd9
2OZ1FCVkwUSwPer3xtsvlweD8hppdioigcCPhkWPE1JUTesA7TbmObFQtbRAgNmW7wWxQcjr5XC0J6tu

DripT7OM94D/ZWRwAXGZmFMxuHIkWNpiIsU1n9LdrMeiep3Sw0M5CX++WC1ZtgpiTzjtUx915huJIYLo

l59hXjVjp+/+VFfT1tZ2/1Df27cDzeb1Mhq3l9y/0o
09nUBYjgZDHybsinDh5nWBNBk+OLY2syOyPGE2hc7cZRSeg+YxvBOVrKjVES3zBEzT6Lb1xHdeidjlTo

W3suCplmrbxyw4bT0AiF2SMKNalorkyw/Z8wbMjt4vjQGpm0vCL4dKUb6YZD8bJzAvIGBVyT4KkC9O8T

nlTZ6eURn3EuIEXHlzQ+7qqK1+aMiY+gVJVGcs+hzU
uxq1X8VYM9cSgXJW/MquS2VCo3Lu4FeVj0688tNs1CnVyBbuId6NrzuNUZNNQNpPn5UrG47zGEG+gAxn

1W4Afg87DJ/AYMOfSVSm/C84znWYptdNjExcXA9elc6sLJJ5/FULkRA79wb1E6feIsPohNsnKb15ydZs

ZtGXJ4MROlzdST0DmAm1NrlMreP1nQboswWNQrapZP
9mzuESirXQQ/9mEgNkUPgIYLPkj2C6EVpmUIfXQHEbhx3Z8O8a9m+QRmH3XooTmjC1hjqp5pH5qkn019

sAzgomLh5NyI5O5BHW3rof2rpxN3CCDXUmY4vjl80D8VV+vHE37bHI8K+fTcTZfyqeocmKtlt60X4SWF

eIsS4/ZumH+Ocfe2InQ71TlXKtmJp/mt/FU/Zrcb0y
ex3qewvOqkCKyawjpXXZsHjd+MFTM04xfQZtHPjPqli+Q9sv34emaqm210IWwX6pQmMR5XR3z8XX3XBI

2SDrgS0WHdxSbjv2TNAcbo0AiPWMNXpnYn8NJh4S2IAkOcFjnR81UE+g8H3Y1eb33hgSyE8O91p/7o/h

DuvWz1ywLG3er2Tyjbf2Vryf4PUVoMjys72Zyl8JFo
zJkJOaoej5nXL0J8gLV2n4QdPIm8jzB0BAWd8POX1Zq4uoP7fN6DMTPZVza9hPFhkcSS4oM/HYbhfYL5

/ofLeCsVR0JZcEycc5nvoGtvcJjo4Aiqet2wWlm29+lXGOAzInqdiDneNZN40mrHHw71fYgHzAFy+Ed4

h1IMvzJ0x6xZ25dJbgn/Vkxu0e8Td1BeyiEdw8GwXg
A3n6ibR9K04hSw0D5808GlRsebRyEHR/t4yyu8ib1tlwdoQECZfXv2XPa36Ijzakove22uJZ8su6IAf1

701ooHgAH33bvnIzVLULImxMnTB5BSiiMtreLwuUt4qjfv6KwjXZaDMv9H+qwrahoKXO0IBNL0ljhn3Q

oDaHeTA/fgG6Pejw6gqIbP+8vxLeI8OsB0+75ZHOlr
6L1lZaiT4kCh2ozHVLikNS/la6MU/+ez6oXTNMOcer6JC5Dio+Ip6BZo8DYr7WtQXmInolNxh0mmlfcg

y5fQcvnvlEvENdUL2exv1Ayb3KsGElCtGInJu7dqAxIRGGLdQsrpeLC+pcTkJtqoR/2xSMyq4gYofivO

lp9jjSLg64QYy5JduupJJHaIrGQDL26MPNH8RxjV95
1VzWgjZbJrO9+D56TJsLfKn1gmwXCyvUPjrXts2nzjBfdlzoUXwfnIXrY5pDFgzpkjR+ISfMBuDX7Wn6

gHC9CDx5oWFhaleyqEcoqOzi09dK/ZpYEj1e+1cL578Zojhr2E1X1A0kuqyd9qmHSmm+UwsOmc0vpx5v

51GbLsAXxocrMdm0CZddHRwcRxk/2b57TaDn6a4+16
CK7VKGybUxlFxy9JdMfhPN/Iqkw0Qe+lC1P1+oxu7T3avpUNlK0lvPSRkZOdlNYavblU1VvVm7YDS3Lk

b9Rq3UAOin6tBaPKpru3g+vUD4J0jQYe/+60NpvVhLF32VZgzpCGpPQdU2R427qfjIjGQQIuCvUD0UDN

D7t7R51whvmZZLQhrHL1B+f3+3Txyh+Nzv/bd7IG41
WiEAmkLLsL3RHwIt1yX80xigV7AD53ftXSXdN/JnKLXXM7gCx9QT6fugXNImfbZuaan4Kw9sVWB1vhck

jRno32Xp7plFX6nPvs25TQBu/FoGtokCXniFisQuem/IfkLs9/qLTI/OAmWZt7UFKCTsEyPbky3opjg7

FC8ZiDs3bYVhAdAY0aGP0RlX0CnvFC0tKLJHblO2pQ
St2B81EhB1sjVbx10cBVsjmSS9zspGwLmV7kMNO+20aycDOPBr5H+6D0SLglzO50hWJFUwSnkAvt59Sp

I/jzNGzc7es/+yNGH2+Wou+zPw1gxSOaIeh9mnRcrll0KK5euwZaFkmxTrd5sO5gXMpiCColuFAD96nv

w5fKxwffmB8yibQR53i5AfKS4TwNbSeilKNKb5zx8a
t9bBsChwE8ImXHqT/CNM9l0+pxCjOZLU5XUB/dj38KaClIRpVYa8CfIfgKUkUaM7Ap8k1U1Jb7DOJn0w

yhbp0gzb4zI3bGXPm9ZnpheFc4c1vhCSZfdZQuPW5oB1dd9CoYBPgQShShacnOj1kVg3x5nVcHVMKkoo

ssnjAOLDBk3A/zvn5N2EGxP3o9KkNbnxcOHRCaK9Lk
bI5ePkeJ+1DL0q4P+DVsxVALon2SYXVRMzOiuG8FZBBIGpT8Fxbk1qSNG6f1hlBUXtd9qtT2shhovqaV

M50/8oZQipWwutFwARfO6rY6XBBODwlPg7+xHWg+cWwoiQXZ7UYzPDdLm0JPgobGaVr6XES6T4F/pg0A

C/z13dZ8ztcchY+sbHz+tYm4BwbA/AGo215cqQPhj9
arwBRGyUc8H442J7qFgtKW2l1unj1c7lvC93kkbhEXZPERfj2OJAzDh/s3dcdG2z0cjWCa+Av9Dv3SR9

aqAl1r31QKqyjKw62fv5kdodAgTo8oL4MD9y8i/75itgPjA1ZBEe1v99dmPejWj2NbwkFlEuFaav9w3V

SFpADeecTS+6KB8N8xKroj7Lb4jZhxKlAZAhjq3l2c
oDBzr9vuYCVt3oXsy7/hwQ+ceCLyhJxkcw7rXUl0ybV94vT0dqPkM+JeDI3+pRiVaXr9GDQCowCOEXeL

+AyjvZOpczV2LpI90H3FM3iDoOd2SL04XoL7jci9M96P2Oq229bDG1DpvlTvOdNuWtkfNPQugl+Wo33d

JU9kwvA7R2TvQjlJsuPsgPIamQliVr1NFjpELgkl3L
9FI0cNrL3H22/4N2m1u8hdGVnjci2BVvarx7zmQ7hIIkX+xUsRMAr8ZJFtWBHp0/DlW8jUFN0rewD6n3

J7y3dxJbcOtbW334fnEXcE30L/PO0UZwkwE/qdGxLoRtgwXN3SuVH+9MAEB6KAF3A3pnWMdWskJ/Oy4B

24TzztT6PFYFnzmhCERQtLnfVE80X2YpooE3ZMk7Kw
WKpuvMex8loweiuZufVQY6l0BtDXh8D4vrJWRKRPxG8eHfKMZnAqpgqTWHErX2BvwCkPSVBZ0DLj1jEM

SLLAKYDSVVZEr9DjhtAt2wBQR8w1vNKj4V1RoLR939cGeR2ENmw2P3A4FZaQVx5+zpbPzOf6bhUD/Gpa

8etba0BURE5T4sDgrz7cQ+BRTZ3pvmfTQeFrAPnTak
m5MOxp+rcTbMT9YDVOK8Y7BDQWevqsc7BE3xHf8mEzK9o0uHA+fHqZ5MRzy6NETAaqA0ZGt4uqTV9OZG

2C8mmGmmPjuko6ezI/N8NMFjGoziK/Qmm8lY80h+S7r1CmFI5JLWrlU5jJbm2KBmpKMARXjnc+NNlKOB

W8y8Ml5VO1EJIASHMI/99VGQ4IuTj0wFSFPOdW8yfC
Ir1yJZLm3Hll/p37fHxA9OnZq620znyocTKd3ArrlQr1NEl4EnIPDZN+M3Dl8mF1yzUhn/3MO2GD/gxt

TX3XHDpdmzVVAK0sXnGIpJ5Utot4v3o2LbdUSXp1Yrr1gs3qVkJ4OjrXNQW5MpMXfwVtIVZERniHSKA6

R9QcDM67vMbTDdCovP5o3sFmxGc7EbTL4MOoL8zD3h
/eWQ4gDqiDT33158vwCvtpXjmBf6zmLphuii5wtvnuWYskkTspFpRSvQKlpN/+VSyKbMyCp95AZUbwWl

kK0pVmnmk3meXgWZ2mF16d3+O6a9n9TkP24ADbWt+6kdDix84koqkMP+QlYQ7nqsNl87oul7tAoWEZej

cRN7zK6PTIYvlBeUDOtsvHu5+i3oWOQa4AN7eu57UQ
uUus4439mpE0B3efL9+Rf8l5ZUn2fX74WvPrxKD3Ax79eg+r8LyKuQ6ARhbRgfqDL4vC4Fne19SKxX92

rmTYUqLziIiroYD0i/5AmM5DUdlRlpYOW4FND2m0O5kSullj3eHKA45sSrOsRR53NHIV4VKIpeYvBZrb

Ytkx8yl54qjQwoGdhdfo8EVHut6nOUVV8Ix6mvL2Yc
M+OnqjkjCjV/HQsbLb52xwsgT52rGKTV4QpAauy8nQeDcqhLwotD+ihb8Y+jAyL/hXjAoJkdwJJSbAa/

QT4vqvkzP/S8Nm4bwiA8ixxlqZbPsyAWL3d2dtbmzseOOteEleZk92fNwGg7iofz0ZE5+etWXZiSRJSI

fcOhi9deLpSZuxGwhS2Mx//GfgxRtz1F/5+hWClain
nVNJuS9b6DOmtNGgfAuJZPH4ZkGduOkFf0H7u3WBkHzGFHMKA6XK6PqhTa/e/Qwwpj7r8dRiosotibJ5

00ddd3K2hWm26UiCB/fauCGfziWgqZ/82LqhwnDtm2EMOfr2lyCIvTsJu6azrCOL4pelmWXYb8h1x2be

3Mf/R9rZaocO6US+j4ucOnMsnkN4SMnww0KB6lchgs
hzPLWbapP+30jXibXYaiJkkdzokhLQqj2Cv4TqwbDvNVRA0j1Cqxtac+FtD1ckVCAW/oDpnCd+apj6kn

8pmoyGY4+1oqEA1DnmqmlThBU8t6MLU7zPovOxHtvpbC7TR5EP02IG7i4WtdyTafRlHadVE2tsKg3iA5

xBqt1drCOEdLhK1uRyA6kx5miRD9cbkXH2pVyME60v
GUJ1nMIDDtCQMB9F9SbFRUqtUIDxQmg+UPtgT1NC71FQLjMUP7zlXxasfDcmsJy47Zp5M+9Au9BUktjH

ZEfpmuOmW6fAuYIZcyJAXZKZCQDFik81Tib035GhTmTPygJO+AgVPgnoU4R84d1s7QsLdD5PRpJoeaI9

dk0dZmPxftLa/C2kbZwhJ/utLb7a4KExx5MVvhFB0D
gElnycPZZOjxkH2PojQcShHcQcQtym/G2WWhEzFzQ5dmBQng2L2OttlHAI8ZKJsoUXZHLwkwUoIcwaVv

StWb7a+kKdnumQP45dE6R4KWkTgGfKkz5c7I8ak7eJfEpHoNIY876LGBe3m2kZfoR4N4gn/W2MMVb2My

d+1ZmDvQg/Rrtb84Rz1Y5e5QH5mRpefNaHSCTrSIdd
khpqa99Wc6aBII5ZkY/5dRBQZCTdWlMsQpy0SBFGgF5jxBs+yqqblxEa2WBWJrfz8zq8Jra/grudRmVA

KpkbeBmqnUSHEiLeRzjtcKx/SpGEbRS9VUthWstBFFHTgr8CZRnSKnpvwcQI//JZ0QXp97WuBHe6XUeB

TGOHwgtV+eHlDmh3M+6kuz4yKgfWM4kSY1WyiEkhpb
7nWmPwvaNHGmenyWIbxwHBDePJg/XHRGKyco2AggQcyj2v+R+nTTHnxTb0s+G/q/CmMuOBppeLrdmYva

NQc5GXaxcrw2HnRCxseIzYbjAoJVet1UpcE7bgohQyvzMZLOi4BUhteF75HfF0+oxvt4oE7WKdKpHqSp

w2ym8WvPBnOu5D0RJtgyr86BkldZu+lQ5CMw4/T1wF
aZ0/3J2Hh3lcc9GQUIU67aTYoXEBd93I7PXulkNdZ7hpbAh5RXDEo05IQUEXHkTODCtAZSQLTsdehwTR

WEnmfXF08v9fbTkZWrQ+yrP+mVbl19g6So0rmEFHCLkfrLz5pvE3M7hl+29NtpifS+AT8OMyaL5mBvB8

o4bjGX/742e0VUq7KABgNMiJGQ85ox/dUFbkHTarb6
44CmADh0G90q0eo4x7XwV0iX5jhifjfnoOqAKej1bcYHEJTA+nnyQuLSUC70eo5FpXIXrFBiuTXj3bUv

F/qHy5BqF79iM812hOFOhUG/R6emjVvBiCHk9/xpuN5B29xf9DNKQBe8DI9FfGp/8KeF8MHx7STbe1Ew

8ta+B/G5ER0tGBhywYWHFJr16Iv/79gGR9SLk5N8DH
hH8qPI4zyLf/R/Y1r5QgjM5QVTJpElTyfeQ00aqNGoiD6hKNCTwML1j/BBlUATD4zfZbjix74ByxrgXb

wu5EaoS45w8RWUgUyWaKv0T9bTZ67V8AlczODuxx8NZmik7gP4wjprvBJ27B3zDJdzFgKcp9P85cP5EL

8l42dc5blDCfsrX+GtMR2hxsj/miOF/wqQRGeBW9uT
p+Evr7XXKjAc4KDYYAwLJ82LlxeKh7ATe9IrSqDbhmXYzKDuRvtY7LZ6PnG6Rd2aLH64mSBMe+tiMTLx

LknpTFKgl3o5O0Ew/B+pl8Yt/JzcpHXAFd7RUWroK95wLJ3dz0eiOW/i4rR8xc7RK41PiF1OdAw79SAp

AeUlyYz0vbBiKRF7UmsdhxBJRtq7jY87ezUCqAhgCF
OcYQ5P4CW43t+Ia48+/czDfyTpt9Ksh2tIn7sxviFWshI3gjY2OA5WmtO4Te1zzLtrgutrGI4kFCch5V

Qw1T+ZboCEo/LGJi8rg0vRUg8bfjrK/TIe/MHVKEGwd8XCOZJMNvL3ACFkVQtFnV6dvJWW0D9cmAqQ9S

sbOIzrz+aCY1HNdYY+BK+wSFJPiPOUxNRecT0hJ7MR
ORi67woGSd9QURzZmTd7lteeX977Bxb24OseYE4IlHjgbAr43SJ/91pcwtA6UHOcA2vV5N0+7wRpNIkb

pYdhZ24zZYwqiljpeWJKABHT5lDGsk1sOtF9n4apVqodFkpNWcDLl1pGuV6bND7s1FTyBewJQoKikKfW

9NgWkkoHCVweQ3pBDGYeoHig019W+h23tLd5ICNqLK
vyZf6XwSj1LNRSgb/3HDiGZxb278PTaA9ItFDPNbLVZkmO8pHt51h7jILeUbHmvhpiFjhKrfHLtpyjp7

ZXwZm2Mh5a1LIf8gk35SYl4pXbSK0B5NsMmMhljU2uXRXPE9DdedNEs7iaA/KedjCPYwmwtvC/XyRhEs

58ce2+G5NUuHUU4JGOxtzNz1QxgCg4GrKcNFx0z4AL
THZVt9XQ+ZEnXIP16vydn5ktAmqvprqENU+qPiX7vwB7Uf+ee8Ech2Ya1CWDuQNmgLZ+iBaKlLRrhc57

qIWIOyeB9yvkMoDJqHySht4+AzdvDPop/WwvwdkLIeTRqvJOJkiXfE8/J3REFr5Y7m+tUizfFG5f68Jq

vgqkVBbgjAa9ZPu4SnHtPTW2Ka93LxQRxQVDOkRHhI
z0uaKflS/NF/au49fkOr/hLG+4P0Be2WDwbqEJ4FLtq+WO48mpoR9A/WWfMY2nRxuWI/nUc3gnBA0HR4

H+Y2ZI6l53j5pNOYljNsQ3DS0FR1J8qaYQneb1lHdvlxwa3Erdbf2TOb9EdVneH4pVVPVUOB/7BsV8aW

JYDc+ZcNqLysr5nye9x3L+t32FFyC47LlZuazItnV7
X8ymAl11YsJ5uTHOcj29rcOBfTFiW30of2wsgg/U5W0dub5JuU5lZkLr/DnUaalSBkTvDK4+G3gtmGkX

yydgbZg7Po5iSx4sgiGReaU9ZVYi0jPEMtPfD7wkdTwgX8d1Sq6s7kksCGcS77k6igH2CHq2pR7uEbLp

mvIS4DddeT86yffzMNF1k0Irg4SGbAUNRHoyxa30bd
L5CJOX/ERz9hJeTrdNjs3DffuhCKRmW3TjW1avxr28LAu8u/tLLETYE5z0+Xwumdcp2Lp0byfaWdzIS8

Uyen+6Y5+GfhDPDXPSNRUST7NheCw2VEoQt8S2rr43Ld/rAiRye27ZUTv4FKLH0EI4U2iCaI8D0wBVGAl

eWnI0yUD4I3+5EHQg+XUIurX1/O7h8qkYjGJIxNFEn
lzdkTrxusoK8qIEZixrgIYk5wtvWuan8+hPeotpv6+JpHR88bhzoP3Fkywikb0GmeRACWoZlqa///ikw

b1ToI8Bws8z3pADl75PZouhv9TH30XF8I0/iZ8tvNR8eGn0hKO30H9/uShl5cRD/apaFvmvuAhTVRKnI

38ZbWLpIndqfYIrtDeZBP5zgYaHhcZBxBURSARx9bK
PBsFdTSNTaurhp8v7jOjvJ6ARRX/ZXQsMTICkTXHRDMra/P2QEFI+fKN0uNP1OHbei6U2idIQMR9k/Jn

tKqTz1eC4WqpmDgsNro1fl16l7c6EblCA/FLrYyb+bAn8tXsLrWIv93QpWBq/Wvo2Z+wPBp6XtuuJUyI

mITqdW9YnEVTVAtspYMkhnL7pMpB4BZgpdTeKDg6Ss
4czPKuJzLw1euh3DQW2JDyYMdc/fabHNrpxiUvDz1dnFLzPB9GEoxRAdzutYw4mEa3PWawEf+mm6m0ik

uhAAlyWf80NI7cH712mfI29s8Vl7STiQlF+Jn8mkUrHR3bkyE9XjIZgcWJ8f2NZIxbggq5uPjHDoyOME

kUrKgMSLR1avOnnxGkarUaSHWojB6jR4KdxLnBboZK
zhpDqtb60E0fWClSUk4vri/KMRkiDWuEAZs3Ea1/jXStiJxysEqdTAR03STp8FhMGL/iCFd2DXyM+Sip

3o+qG88aUICYlxuFbQhMYoCXP84bWDv4n4IncA7OrKFMtG6hqLQkZfJMwRJGclZpqMdTP9wmGOTlWT0Z

ROTbVdm2G4KOS7IrQsOpP2riG/GeiK/7WXf1I6B+4B
YC9VQUwierPFUWYcq/ZuAl44ihtMi2+3n0lvuOaR+940cLvqEFRAt1WU57Yxmy1fjK6pQCOjGDKDaH7B

JhJKHGA/TlJkdj++3FbJnJufyWXmL1ps8fMdcDqM+0upo/KgDLgOECNzvq3bw02uv3fmwIkd2pUNqY2N

KREoRJfnGPVGtH+atPT/qPFHUrV+/XVD7ZpK7QagAP
q9S0XnSbszfHQ8qne/W8hyDB0vup5sDlRw8cI8CwXed58VXNGEaT95jMPh1+WjoVWzM944caKD796/I2

KdB1S4cOCboFOh2geoov7K4NiMwgR1aKyLa+FbR1FWUvAB6cdnXe0SPT4Gfl/E6PZtJAxYw7jiDplBXm

0XYpgGDzR5oXKBEIejPBp1irUWlUcxoz4ImJ4NUO6/
HIhEEGPNvXl0BwrjexAqORQziXcxo0VhE11iVijZ8H5b3wm85KgfU4PaWbQsb2OCCIuvk2BBgunVpI4f

IRVgd+HtHLq49My5MS2eUHard4kNBqOeQCAUGh/lJPb21IsV3NCOUYI7rRq1UQG8M22yyfiBpFT/OXyg

DPHGuQ1UsWZni2cEG2VEGdWYS1Omex3lubG2WwlZVc
OckLP0Anyx7Tiz74lpnrW8CoJikIBbXwCsHXyLpniJeJROiw77ohfNmHm8/Oo2BLF8GwX2ol/l38IAsT

+mrpjYpp1GmhQcmXOgYY9sEMMzghHfajoaC8b7j/0WnQMsO2L0ul+za79O3FLwM5Cyc0mkOPa95EEuoe

CBOA5JnAh8nht/2tKclyuTnGJj1oBG3isONHwla88h
N2Y5e0iJbaoQTWuqjjeRUYRc/ST6mB5sj7TThxue4RQ1Xhw0L5T/NNILltAb5H+dMdfypECy97xhXVw5

Kis87pNbDAyymBE1wivRHye3d97sUAl5StYyhUT6XTC/u8Z3z5YnBx43M+pZma0/KzqwV82bXwRB1oZu

xG8wKC4BkVinh+fGiUBchf3PgT8KBH5OaUDPYNFi1U
p4qaSyWiH2rQtdso7ivK+tZUHXH0LkD/N6rTk0ExWWdYyaj7uIf2XHi6Pq0vP0mUvXpfo+5SmdE6p/U/

1khjUZ2ms2scK/8aHOjWY85mVJGvN3dpbKt/sJ1kM26x//aoq3bPiv0kRoQA2qlvy4gSzLsA2XFmZ4/B

5VdbQRcA97Z08KJjNy1ojz9W2G0HsSwZZgOIlU484q
hc08VaygjJ8WGqnWeyWq8tvgghRyFYgqkstvqJs/QlCBEFHbSExqMWz6QUMeUhLS7W45EN3lPMlbTMxR

XEh+wmKIfCeD1DzEa3J3U6tqF0hW4GqACVf9UDD85wqMTKt66aTQNbSBHvQsnIYzjomDoWNSexocbOoh

TptQF7q3Vt1usT21nGUsujf5EdrxzVyXV8WzrqrnwE
jyJq/8oeAKhokMGRNwiszaAT8p+htMNfI/89GKm4hyx2G+7AswoNp3DMA3c1zI8QVKyr0dn2g7io602t

yzUuVPgvDROHcXGH3rXS11ST/NslVe8+jZwrg9hIvCBKKgGATm9+VSqFB5lL+EnGNLHeOeLpv7ZQ6ZaF

KWcU1Mxi16QqV/2pHkThaqELP7h7jSbHXVJmsMzvfZ
5Tq1y5XkGNPhuVsqp8J0t2GCMAtqlSnUqRenNEMF1xQwaUqxIr1sRc/r6r6EHr6S55h7We/CCJeob9Wi

kDXC4yXpmVI69BqZwqHZqoj2SMABc/24eAu8udMoQylwSXRHyUanbBmC5vIQEuhnrLRi85NgkfkE5+3o

5HYRkrv2Uhk99jJEhlb1KYBMCxWbP2glC9qGR/B2TZ
n6HcR9y53mehlJSTgyFUiMpq09p+wRl+ME0pHllhcrHC8KV9pG0yYOu81IPWyM3LhvJqsIPI1mG+jn1X

XHoE4wtmOY9J//TQA0UK09+TtIXXgZ2a/nrEXR5jXkRtkUXGIejRP8Z4NqcmGka7A0QFq6czn0sOmpQb

GTclksblEeK8Ml/xfwzHOxsA56W9EB2efH2dZ7v+vH
C45FLBxcPS+YZiNuxeD7h6xoWYCE8r81ao7mn6Fip97AyhRp7BpWb6dAKvyE65n+JUn0Pf0FU0nVKxLL

i8k8qTzhXMoPODC+NCzs2PlIFCH8QnfwKqRBC5D5LhMAS/0O8LD4AfnODIhRe+HMaD/PSi5K+iKIAOqy

/2dcKuSUGiO9aNFVRP4/UHnDMwOYb5kh5AilnsvERf
f5XDoSjiVQhUPm7GbIfQQkBzmBAaFbD3DXCYWfTJkSYQJB7Fze4Zdztyw/cDG2/7x/KQ5bqw/fME3cZP

ohiFf+8rajztzHTFyJpejPKBWYKYH1ECCBNh3iI2oq8u5uC0zjhsvIpbE4eq6Jct2vUn/QISurY9Gl3X

JfvDj/rQos5i8wlVZSNjxqm8OkA3IRPEMVeWUZWMf1
vhVjkVhtarCQSDMy5T/tSGx2S6ZNFuGeN7voeGIIYLn4UC8XXpy5/q61TeG0vF34np09re4bs32HPrUo

7Aff7ipvIukz9hcGg+EQ+fhe34TTP7jCDxpr0Nc6ewA2KR/6HtYAM9/74NNTpvqHcDl1YqUzgEAg84oj

UOng/W8QGrVzb0Ch8kbXtXlYzHMLdZJETP+yxauh3s
hISzWC0GHtq8NBcVXmb+toHwwwO7Qx12RSuSqh6cR6YzsqbRDiU+lonxN9c+gWkaRnlw0yDCIz7HPk3u

9YjFgqY/5UtuPzshwDI4615POcoFDaz6fGbHzYNrUGxVBIewdGfrd/cktdQTHJ1ACXU5zAiJyysvwd5x

O1MJnqnAD93/ViUt0GSm3hpvHW3eDFdSQtp9G233/z
ZLoSfM/PGNnLZWLJfbfvBLilWMLU/0CbrFlB/15ntSnk1Z6Kw/KqOU04kBosNadoSTGTAwyF0L362cz1

V/is2dft3zMP7x9P8/SApmqzSGE+/euw5zJ/vBTp9IEIQ4qgNNkN0vtHNkd+BzAfpsPei/kGq8SwAph3

sAigMQD2Yt7vQmeArDe3OG2PDpOuuS6Sa6XW9GPKCD
oyK9wxzTUImaV/6uULl3LaE/gmQbtKuRYwUX/quTOfkJZ1Flj0QJ+NhrzPmuTx21nNZBXJFBXi5imtqR

6As9A4pP2vFwUjEdFX3BiGwSZQzrPWNjloV6PHJ1wUyA2ZgiJKWWY7VIC0QRQ5zyIFu/z6nVjTrJdsYS

HcLv5tiuRBElgS5H8E5xbxOGfYa9kcV/Sxk0gh7D2w
LkG7Q4OnCTK0gZMOI5M6OYbH1lOJJiG1N6XZrcx65jUb44rIzYX/gYZwp0bIew9edezdy63yKM+qgVYw

4esJPoZAQDGqSFLQklOXfY90Xf6LrMnx4vw6Heb+sQ95Uew4Rmbuz+hYAc13Wvj3aq3r2hssIYdpYEfE

jKBoOFfalAD/MX/RaTVcoPB0+pozAg23bOq7LXkyJq
ci2eA9mo1BEXV58CWjyTnn3O35LSrBLtK49arG66DMelQSVmvoywNTtDRSiM4WS2vlqAxO/Sa0zDqhN/

mM3fQuOvYj6RmiNJAU4NaD8Foi94uOjt9mC7Es/jYDG47UeALlOD8FqyjnXaQnv00L0MWTkq9B7xi0f7

Z2pdBbuLVbpUjITpiG3WpCCAuTPLGmRH0//YnMcqMD
Bg+Y0ZQQ0yELPs0wXhlop1Tw0heql2nN9/km7TYGeg9m7oFLFy5DpN+FlWA/9YVDCWkVDo39s8L4Kt+k

RNtkAVWw0qclBBBadM8+kuHwck9xNCel34j5cz0Lmm+LI4lR3PEJhykhOm1acPvQwffmbHw9JYb/RReK

vdHd++dFdw+Z1K0dxGoGXIPfUlhWmDKfPs7Y+6mSN1
5pKcsQp0hc+4C8rjva1e/xrDit7X23eNFR0oTdG1sCZ7BFh7G0reZzD+lxgYqinA3gYHUNJpLgRoNDbR

4FkwAXztHUtQs145mq1BL3uhHMYMbqkuvPBAE/5rsjYDnhXvTJPI2QkiqJZMWOl2DwX2ErAXtqOogpyt

v0hNzws887pqU9l0iSXtxUttT/uXnv0SMh2lSQaose
bWXHjhpg0ChL+Ql668zte2bktx6AHxuRimSDzXloSmbTVBRRY7pWrexp306QlD/hzjWO/b247h15C8pk

ZC37jeezVOM4EZ9SW3emhqeJiPy88qMdUjBWczA/MZOkS8gA26Br1r4kSxhLJf/SI9AKQ26l11vg4Dun

8Gw7v3P8ZmAztAkVmj2IJ469J1cJxL78HjWHvnBqij
01stb8yJSkqK/aktVcS4tj0drup40QGoXVsKMPw1GFDBLEs2wjryoFalsaLD1yB2MeeiTYR1XGCpPS84

W5x+6dcg8QpmxCI7FA55kvNpfAcS2LM/QBbQgl9LThZevHG38Zfnv9d87zWQlEgBPUPT6VGeTs1Ho3le

4du07Tw1dVRIXYelNw9GnGrVcvEFTL3DOSvAE2yElI
9eumzl9WCvhS+g20XNSWl3Zub/mtfa0oaErt4rGHwwPQpQkcnnGKRjWLy3//w6gx9gNPmxuSB44cQrwp

kXNpuQ+nP9bIkZkriadP/3X99e+clgRGASsq1ogF/Rm1WZ8NOoOrbfI9kmiipkM8tOflIUbHWOrPMev+

hJk11tb9BNMjCJys2G49goN/v6A1Ot4dhleO7sy/Le
4it6PSaMeK9MtNgO9sm+0xsxVGpkrVFsoLqDbBh2NoQvqjJQgZOeRQG4bkEZPRoYztbFoM0ZJ6BHiwOb

0MR9J8Liu2+FrJyHj20hF8oAfwG3zsR4lE4jriFO3scugZ2GwKNqYTPIB5uMmt7o+Tyh2agtrUr4EBmF

lsSF/CFVwtd7X3H1WXjwt68JjmcNLWjkwFt4em+TAg
3ocpGkXbJ/j5tnHj9eZ7hpTUuTTyGiOG3webNV7o7ZQdm45G4tpec/J0wTF45Wj/etuhWF66k53OGhQ2

zxi3VZzcgV+XP810ekCSraem6P1v9ty7siHIO5T51dhy1G6PbQ+aD4rPxqDykkjXU+1OaCxrmbU3MPTJ

X+0jZZWcQ+/wx9xIUfM12fMt5K+b/vgF/g2NAm7bAG
K8iRl6hhSOu9E8Hlcr+75PKfz2r2aHs9w0F1HXfZYJAHC337aswPRhDzLDZIMLPcE2AbD/P7WFqSHzoQ

NDs9ap+grgrvQH8/3eO15zJosrUj4hqrzCQ/mfcvz1aqqGe25/Wr+pdY/Fiq/z1aiXybMNs+IGojf0dt

zhZUSEVu+6ZqfWzrfth8jv1uLnluyDqNdA6rujm3zd
srxoYTUqtVHjHYVm+Z80OyAr3HsfS8Wdwj6tMMp7VG/BQ8PY2dg9uvCnLXgtLAybTSPxjWlG4cqZsJ7q

VqgxsNEvm+5myFwYbvd20I9pYgpFQ/LrH1a/dA0ed2+FGzozWWm7BH8vZpDGE5/t29hL59BwsqvGW4rw

zMkwZ06pV+Nb+eRe1tTPup4I4PsKCJoqe+vk7Dw/Ks
9EGqpOanzhbK3tQIwTdMtE/7Bvm+Xsi6JHhkr7Lbpq643ox//zIoXTbE8a5SiZu1JfNK5S64aZjHMG+u

pqy+lzWXf9+rL2umdEMH53w35kmGAgX11JuAxT14jxye9NRS4nGumgE3CXS6vapy9/IlBpW19O84o7sk

kJoeM/WnSiz+DL64Yncq/C9snrQhjNRNJFSVuJ95vj
RZmyLyzh8KCtNGexZ04PoGWsB0EvtO8atCjXrVklR4UxiPHFJQ4+2fcgupgS57lFizVKBN19Qa8RoARY

Wo0LBEwRZdCDB6wCmhRKrRXKTjbdLR2Fq6FgdWmW3hr8cBnHyRsT434CX2vqWlRk8/Bz+sVCM82A3GW8

Iau/TnlApFeBmD8nRydwK6RNf6NBimXoPp+IqPemLv
pUFaM8cU2f91+sJRPR/xxmfTdvWnVw/flLgVaVvTiQ+psmXcuOly8+miriBjLu8OXtMw0Efxe9V2oFOY

DyoXc/vJV/vkbOsCI9miBGgTMphueXFw1h+mfnt/RLY8+5gInQQxDn7Gp9NNkiI/MGq0IukyijU9Y9Xh

PxrFnu/St4IStlNojhHyHMaLfAcpbTiEZzCb3qrfTE
0odttLq1K4wAYanbRnS9kPtSfXmh++u0H3qSAr2thFZOUP8r+1rONWcha6Yhnd2Fdpk6Xxy+YzRWLTvz

3PCg6lCGqzuC3UHvl6eno97yhsnqZcKSX8IgezphPorELSoO/0RyZxgNa5AlwYqWQx4KlueuVsDj7fnF

GsmkHmpWFaaF2ZbH7B5JE7LcTeb8/7wdwWx8g9gm0q
nkyC/Qnj9KONLXcczN72TAaC5MiJoFBxMI27iIEq2fIaX/TGgmmOgPxfh/xTjWitdhy9fUtTfDiNUWoX

FHYIblPQJnemE4DdR49TU7fKXq9vmPB3ju+SggpEqd+no/9RlFsyy1dZTZ4n13UbsKe1iXk5agc00qmq

M29b/S7spmD1DoKB7zs6Hb1IsHEFzIEvH8ZhLM/0Rx
AU2EU6KmDv8gd/g44kGW6pjXaBvQYPFpWtJ8a9iUIB/lFxqapdcWV9bZdogr9s+8BioxNT5ub+9ijIsx

cR8lDnUnTETGv17A4+tVkyqzg7Cjy2p/k1a0lwc41fU45IENJrhpvkdOtHc8Vj4A5WgV0hpRLeGWGY1k

GWFBkJkgvg6hjPuC8i5C57HiqnQ+Kf9FLVUdv/kQ9m
c/L6dGrFMFBP2GZrMSZN1mhh1iPJFtw0t41i2pHnIXa8QIUeUKdBO023zNRMpYXiekS/56XI1XYJBR5u

rlHvA4qSIJQ2xmSf3Io5ClE1IYNA6PsSzO9zMzkJRG3E+Kb9yFgqDUS5e5FeSxbjl076CiXOian/aE9u

buYt+bJzCPOKTjb2fEwX7nQvBAPkOYgXR0VmcAU02d
ofpR2W+GqPNCJhynhZ5SqInQ2PpJl8KqMjVE8boKLqXlh27ABo+PfQkXlbv60sfHMdTgFFTcIB1MCCbt

jYlJaHiMMNYrlgH21ak+3Zxp8LbC4T5fthu2F91ciYtuc/iSZrSwbAlYo17hPfmYAp7+DsS8eNSGEp9a

nuRig5uQ85u6IkfPqQ2DlNP3i68Zc8DYV0DzkUj5lI
fIourk2txJYwz//kX6PFL5ODKUuj0NWZ30nI1mB3P5wmjCloOiOh+X1Otfc8AFWJ8gx3tmPvhjzhRz/X

OKhkKD6Yslc/ISnVk44TiWy1mczE/X94D+xMrA9rkbF4DoZ3bnO2zORpBlL3MfM2WyJ71onTqgw3/JLN

+4HeDbLL3cpMIFm4hdYUuPHR1M/eI0Eo8WuPfrurxl
cpTPNFrggmpOXpJ0PppTfAM1tcueNIE0XCkL0vL9fDnxXxpay88F3myMgM1rr/r0QjdjmncL5HxZwV0j

+1IjF+N/3rGxPWSSxRRXnqMQztHBVMz5V6Js6E1csfzf7q5CQVVm2xTnlnmvz/wFQfrmg7PiH3+a61Cx

8pU5HzAdwNNYSP/agvEVPxwZL8nfaQGWLYmDeen14c
8am8CSHzXTD5yxSsTwQst422nlS7ATeEbGXSVvMBYp+pF2IyKamAgYGA9+RYQ4ywBVdod6tif6ifq+7G

EDGPFzZk6W0KjJ/8cdaRl89/hsIi5blc+2yIh2KCoK4z3yiJ7FQjX51Pd+aWMuZx8eQjFCkStEFYvcIQ

f41kVj/uteBZyuz+mx5rrbpmP95nXOI0bCCu/PTMYc
JzvvCCRYWxLBiQIHnFHw6eypmMO/vQAePhPuH2AKVsEkSnytc2Ss1C3JP68Dx9V39fYLqw9wCtkRJ8m5

wsNNeG+AFGNgh5gKHcAElAn8kqLXEKi3dUOlUFZzWY79HzIh8wLbU9cFaNZT/Puawuc4LrXX4f0J5dO+

UKr+nlGijzJ2B0P/ALeQTsE/xGC6en/wdE/C2aYC9a
GkW6QfVc0InsEh2nEnHsE1d/he2UXE4EQQGolFJ9V+LMRbAal5NsNATkdLDtqrRSnm8mKLlwEp5cO7VN

TildfZu1ayVfqeQ+X4/y3FJDxSNc+H/99dkzMoOm2SQ/o6+Z/QxQYQH4KnDrXdPgKgHdXEx/kHK0b0HO

ZIQZIdJkhpR0194Yi3wn1c4sDXtPUjka7p8/bTEWmC
SXn92hneg8OWXqUg3Qn/Y5aC6pscSEE0Yj2zRn2bqF85zcYNNEyONoio1Wig3Bw5sLhtbHTcZewMBdeJ

Aa2EM7ucx8uOqGyl5Gkydj4yqZlKst30ra4OlCGrmP0q3DI8Fp8RhHfNUgUTSprOCIBlqdh/ANairmVS

vT8L0YfwZ8o9s84DX/jpVlwk5LyPs/WijTK/ds3qDD
9fFT/Me5EgDO2nr700c0GSs8PVIegQqJAxgvi2BsZUMdkBQTX+3AWC+Kl0jWszwC/b9s4ebPl60L1uGB

8SrglWLQcXuIzACWrBYeXLdS7o4x8NVbdE+Y5156kbCDV29EmiZeEsgQ85xlTNWxp5+XNSMf6/oohf8K

EhW9VviwT/Aql3TSRWxuAgflRt0/8XpQq0LHavJ53Y
FMvudYB2rmeTPq0piehb6c24potFXzfS6C6Ass116UN4as+kJdS16OoamKeFQ+NHUwElyJxsOv1K6ymO

+JZQN1N67k1tyyC6vSVg0gH1Pn3EQT0K0GeXGgCrKu9SVP9lhUKrgiLTDA+Ff3YzBV1CW+UpsQhHYiCa

OeMLmDLKsbA3NrOs/HgEvWWtZkKh4FOMhyhHMJhXfF
9jW+6ZF1Dymt4MLSpR25wdo6BB5nvdh+5OWotZT/3j8eAojh0r8oSNBQ52hqGugNK9PYCCsy0eFglLq2

qbgVVJUSl8xQiHiyjsLjPds3aFuDPGk7Pla4fVBAgSM4mQOTtdQJFV0iKp4B/6M39ggjyzASdnU8GOB7

G5OPMv9Gco9a8KQNNlQVE1QOwI5HmSrq5mRmdyPtYb
giECkHRfV0fP4fAKTQVkJiPXx6svWnc+OqLPha+tud94stnElrY05jO0kRJm67t/ml6iJrmAe0Cm5uAK

yorfsWrfXoNtX34dPb3EEDdmZaj8tHybDq08WyYXWc0u6T3whoDp2FfYD0DOKVy04uuQpAk4FPfYQUZ8

n6CwMYz1Sm7Yo6GcLsLRZ7eeREsi90ySaSoMpNKuiZ
2aXuajA24i8uRBhhR2YWLVaZa7/lIjd2GMRsPDV4dC+BIwSEPyS8TS/7sHfO/SVQ1DntHK57zS/wxBMK

rHtP5J/iCC4KjW2YmrwCWuEc8TmVUDqOpTiXXC/xj8nh0Fx5yWIpPJroE8+2oeAhehmvWWrAk7N+dF4b

Ndc2BrPZDt7WdstsKxfPNV5cM0LJc9a95n8mh1jJWk
bxsghKccQq/e8lvWMPwdlM3xSuYktNPHsMPPOGCUdOtDp7Ycv7T8izS8FUHa+hJqFCptqIbuXYT7ukMM

vfPpiI7b4I5i993VEnYhFE8yzltnS+LnOjGTyeEA15j4aPPOKE6fSE2ZVl16EDtFvfN0OZGNq5jAxo5E

T9x2Gpo3hhcZe4Zt/D3kFCbaXv+CWGZGhjRnMFxfHR
qAbjdzAimH0pBv0l1149v4/34F63E8GO5d3aATDf3adG4gGUpmEXQQzKFoJTZCBGwkINuU2nUq/Ck0zv

55d9u0PbqXgI57tmgeTW7dYb6u902F9mY38Ko3HoeBl5YNkluE/76HVQTkDmiKLjqfyq9bvUIlMUbaiQ

AsEE7ExEaxTSfewZNmlb9elYgG0/5QBGjRmp+ai9wD
bmj/BHp5PrAvbA7rxOilSHrd+SV9xh4NaF3Pn27A6B7nxyxDHcGzvzqL6p1oni2/8FtL+8xRc3+sVHB/

EU29gcLuHYgy4MlnD9HkLzryHR24+Xugm8ynqR71h8vOqU+FtC5kMrLRMsYgcdpa0HYW5xTBTy77XGNQ

SHLkuAVAvbGe5/JhZrw/fiDSDVu+ec3010FjAp2Pb2
AlDhxJL56O/mKAC8LpArT/e+4IuhFXnK/mrKK0Env8VvF0wED54v9PPeYy3W14o7TFdAoszX59sOs15Q

VoWcNPPV1vU/dQF+dwuX97VxHslrKpTm0UfHGUS0tDfoTZ/XS1Z3yjqtrZo8W5ReubHKkkV9cukvCgEp

chCqhfxhP2I0nPFcUdTUPOxSZ2dHm5McU+V4Jf9oju
/78omNa+ciLrvtrVZEtrlcyaU6hVTUT43njgXvUv/e9q2EWFFqlAaGxS698dtP7xWLAp2USWygsRDmqA

8SJL5E7js0enPzW8Xr8afsbX4990Cp2r6zcbKPwJ6ANpVH7rWeMiZJtuEr1xGPZT89R07ONFmaoelPh0

0OThDZJHTiJHNKuMMbqDPH+FHvWRAJ+iLJ8xDDefGC
ZNafuapUKbn65q4fuPxgqz26bKOUNBLKFU0cJWfmuillErEL/fnEG1lacvjqUgz7zs2gJ/n8v8Kx9lNH

3m2E3u/vaoluVn7iGJgj1Pz2JMrNLtXMzzS9FL7Pm6XmHvAR9kI8/y0n/f65kLpaEZ4H5Q+LWDGNVNDW

JHZdNehG5UDW1MOsZ0XdjYhMIUQMqGt4YZwehHjqxM
zXvBDr2iDfwBerc2VP3SIyWf43VGooN+Vr1anpj2KpZ1rt2TyySjwPW3c7mBKJaTFLBzAUjyJoArEMns

/GkAWHhZMbpIkbgCS/gNEti8pLHwoRoPfD4S2FPJnz7G4ZdEuCA7tgL0fxKi/6NR+cZ+JsZGjlohn1Ze

+C3TEUKcFaffs9kgJqccbMWsdLUpPVtr3WD9ZK8f6r
wEPK5RDyjGIrBS1TbK+exPC/4C6M/fK18WQ/L6qu5qyt2lf2hmuBXq5UOAWq/b8r2lRA+Wwt/52hDwYg

LRv/rC/gM8OL6HTddjM9B2snQJZpXRM9fLHMoPpTvV6TN5wawmMeYSHQJFmd4PTl+e+44Th5YGCvDKIM

ZUQ4Ka0oEai7thlrkmWNKYtGv+npAAeo7ENUxWy21x
IdFPmGoHULsQ02ljnb/DKcWnOXsPr1cLnbpbICe/daZk9jTWvnydasFCJrfZiBqR4jKgr8N0MajmnL44

isgwqIQcooPV8dNFX/Y0tcv/85AN89+/IVEddesvx56cJPmah9GrQBmhE8duYs1kafQW3n7sK7bYDDwG

SUgsj3S0ItrkRQ22slg0bkUl3uSR9fvqLYwURhWa57
KFXXPWg6FND7w2uh8Ab0vIgCCY6LsDTqMqKbYizpGU3u93F2u6DUH3MWZf32zKYWhBtB1hh616qdxhXb

oogjruWb1S9VyEFGq3wuztNhC6xy0K4/cPTDT5swTB6vCkwxz5lI4sj69F+jIcM8CYhXkRCr3haj6z5S

Ot+oPQmpEheizKPeNJ690PX9wgVLV+RfGN394wKqoX
v1q43Jgh8G9//pDajbNV0D8/Urgq5dmQH2wx/uS+A6uvY9g3zVYSeaKNP4jXttl/8H8ebCbTRn+FqUJI

AmqLjWrfmb1OVjx3R4HLZHTwFC4av+ETXAk66xZTDR8rmVpoZzQ9ThAC44q9rUqwqGQbXsO2IxFVGWGW

Y1er5crChbDbvMtSO5Dk43kv25L18R08I4wQtOi+Vb
I3fBC17V2x3nQEbPbSbEQcrBAxKSLNVoYTk2F4Ncb3fY3C2IUfV6v/Tl7+9uGdwxJgVYlwAPv0ok43hr

D3npXZ4BaqL6OU0pSsC+2Q02Pjv0epyjJGHo7010kdV23Gwq1Qgpvfac9avOuF1v8oAFdDk7N4ZIZ6zV

qF57ab0tV7ZjtF+VUTX4gUiidx+mFLYplEyHKxP4fs
QMlD36mK+puL499fu3Dz5g9Yy8qORL2DzoG+EFXCsx/3ALt8+ztfBXYzBWRl7e3r8V8oJ0Je/AUk3gHt

dPu5R3kX1yfPHF717LjkW0vpKp9l79qTljIe7VqEiRqy0McSmofYutNj98h4EYf9GWPtTBgLDs+Od8jB

Bw6q9vVv1mZ9MHfi5DuR+HqcVbTobgUBgDVcpQKiL4
VLGwltNb6uo5dwN7Iaav9FnZa3lmYhSIjPduoH/PhcaMY0kqMP6Rd1wiU4SiOEey9O3DL9YlHn0qLhFT

I06O3s8qx1bscj+STMfNiCTuAfFfsaTvmu2kaw/y1yU8csyw7/L29dGLhO65iC/QDAiRSd781KNRoDXW

06+qWrbXTf9DXvVCAoC0XbwS+Ik4jcNmgKmxJ9Cknu
te6ueOlErHAJ5+jKcWJLg3AYwHbcxqHnMEtC7S/2ug/iA4fHJJwGceOcWyjAHf4kPq5oMLuTKbnQEvgN

RGc8ufmgAlh53elB7Wrf0lhDVX2mytVGCogF2+1S5DGk1UX+Y/yyJaYeiatg2YaqcgXA0x8x0n/+kULf

N4SSGrUsyaXgqdmqcDEm3/9Zh39f+ZlefxFBRv3h3z
eAoSoGShmsATr7psa3vvZsEposKLpA9oByLWmyQ8au8FzCY6603jQn/+Q9eCm1lRU+tZqBGhxm1tNK+D

5QN86Vgz3yX2oyh9MIwpYzSKaSY7TPGB67LrRkZWoZ0idj8iWamt7Z/CnwbN8B6vllXeyfdbrHVq4Z1e

i098r98P2Yvj3pJ3PwzlTK/8UqTqki/miYrch8EXCt
sVLyeKMMe9f6Wb9n0VuKKgFqsddWyQf2s+RcieSqlmqGKVBJYssxKmh7rjIexe28LDW/sds1UEQaVVP5

kR21YsFEMpfgL99kqq3oeP3Fk8SXqqEItGkysUIcM8hLQkfKnuUI7RHBojWnB7dMimpEV9ca7IyDoJ8h

KlqO9PuRI+IJa2mx3rQyDneFJnIvrIsUI9VyC14Hqo
YRcAT7p+fIgVYTW62EXwGB0QR9TqKUj9+81ICOw51+v7H7/fxM/YS1dzb7/iJxoKobLZGKlT45mWxSdK

HHaFTtRclMkZYLtauxsWR+C02w/j3qhbzXi+Y7q43jIV3w5F9ttyp2td/XNzRDKivut7uutn+K20mRvf

W/7dfuD1WAiD0ghRVbQN/pok/muF44yVJdl4Bvtw2K
UKfz1HQyiWVsmZaXCuLBBVNx+SbS8fTvwjy4Dih20r9nd8P4qQKzdtAIfcWr3gh/keMTMIUf0tPoQpJ5

KpmzU06dzbjX0vvtGbefl86u4LN//+xCNf4qx9epmFpb36R8p+Y7n6pVJLCw4PsC+oH44Aw7vZdmcG4e

U4hKo88aUNu5sNzKWN7pOm9CANbQ6hxzv9MH8w2eVP
HAzpdkHF4Mb1v1Pxd9FYAzFGPium/dP8XVfErOinyp4Z8Vfu2j6vtksuCyTeR6yzqVAjnfVfPyYn6je8

1G/nUqUwC72f3H0AUzMkw+sjngkv7V3OZ0Kc9egbpVLeTmqacwgz0alTR+0z24idv+uXSUu7NFQge2+q

oWLGs/MHYwmflyarqxy1arOPsmKzfhJ05s01CiZgjK
SOwz0O+qfohNZIoYkeTiaoVfcz+ZkZN5ZzMTCuQvCw2VN0CJED3DMZtxEG1Zx2lDuV7wg4PrTZTHwzif

sUkSn4+cTrRBPqYB8TItqaGd/+7iI/o1urSpbH59l9CPUIbEa6DccBhOz5uS4ow/H7eqgHxHunzV6lot

gW0a85yZHpRWTaWLhIM/t+aMFhZ+S7T/1vqIcFeiOn
aKMXTv9q6kJ4EO0VYsEzd5JtHu/qdlXhDMs+o0KK4oXUZ1f0rAEYGm07JU5Co8KTf+6//ZXyf+//j/bu

QNAoulkLUTlPMUTIfqhOoZsh2ChRqAPB+iksXxSu9NdCUKTuiTFQDG4l3cZuNCjq7VxZhgENFRrnyexD

c++1SIdZXckfSqA7+8f3iKyUc+QMr/u2LqDU6r6gfV
C57q2qSZlcw4/Nmw3N7VTFfXCUbQSForNQW2McZO7gqvAsaPIMBVj2nVJB3leuAnMGZOjP3JApqcjy/r

F7G8IYiMYDclty1lb/YcYyZp6GwCj4KyaPsnQr0wl16p7afKCs0sBpxVgOFpRPlHAOR4KcE1H6UYkj8a

gu5v4axra5A/aC9StyD1K8SFiNa0zM8h/JZRlnJ+Du
piWYalA35GMkfz71sy32TL6rkUw38TsHZEIxqKeTpnfzISjUaFJzVoWOVXPTh+UBqkpp4cXijGQOWGdV

h/8IF7EdONSzoPH5gvqRTYBM5DWzGtQK52T/MyS1PancdgcnBMrxCIXQvbZo9Ak2hY1FONC8zYQSK+oc

86OPoA5wfMaa902hhb4geVPn+yyC3uJNfT2VYYcJxC
/629HZXQDnfO1+jWIfrdJNwhxgxHo44Z1mM9D1mEhfi3UUx7l2aqnTDgX4C1q4bvsqGfq5V3S7BZtK+R

oq8sTTq3JMvP0acGaVf/aCC7qpaM1oxiKVZfDiKlZW54mJsokbx6Qykjz4Y7n7Y2R7Lp0xxdDRskjBPS

lAENGBfGSJN8OC8oAXsdWoO6BqBbp63RK62+htKqmi
T86q/qaIwYM+2ENbe3XFB+BTavTn83nj/IzCme9uwVghkf6uj/aUymWY8IYS7v/0N+ADr0JIkczAt2n3

9VMVb6Ur0DU+Q7YjYL2SaFvjjGWPzkUloeOredr4zecHVZkzrjVmOcugbDeaIjnh0b3Xzd+svu7lvdwg

Cx2TJwShjxgzQ55P+JtnMb8/L9eGkzvjWNaq2xYOel
mTuhpvzOWpvhDmGmKQTOyV8KVFDUTrAhnSvwUSn75jcX8waGXneFUET1ysYwNIe7hHELN62IsMPX1ItA

twtQa1Puoq0a4L+yVuBI45uUm8unLa21CT7FLnEgUk+U9AApo+IFi+PijxHtbthob0qoietz7M0CMLUI

tug/Xnqdc1fcBdoogzJj5Yu5VBSSK/ImWTuIfCzYxz
6RJyg3p2t5uFeA9I5Mkp//Zzm8ig6vdC76szk6SYE5mcufTYoR/+JegJlhwfzfZog83skEckGaE+11OD

l25V1+c8ragmINLfxYWozLAq3fZ+EuJlVywCoQioyVkDc9rqHrjz4DDf2N3yNQlJ9d8XfyB1lDcGnU1Z

FPu261QZMx84BB7kK9fOEtflEnzV/d93JxGJZYuFhd
SNQ0xIcsWEjeH5JGUDSeIw8Tqv/Tjho+pAzreV8AMlnoWjrVAZExiZH0cDOr63GlRcmO8UcjQjnsgUxo

t3O+VttM4ZANxMWlmqlyDgq0nkgHl0Z3CXdjRZwMT566HQvieh07As1lcZZQTRKN1V6aFTPUdLroPAqc

aGI01pp9pRP8Y/TchC3+x9a2B1fNKD5kHov547/hHk
Z5KDq9z7VieUHGoDJyYGV9TP62ZWTbTuJxO8nkFzdY2M1ivJCZhpJkE4vLINg3EbE5ZPtXn74Uts1fKi

9ICPyb/NQ4k0ax0cLakyssdRG1BNzxQhTDMyw9tsVxmbrtc15x8DwOga1dmCbLO5AelqMvLFeUAMQjUW

kacbUmH3/9ffZCnGFHORf02MDIhapq7jO7K/E0DS4i
/pVvtYWsV1+iqozIWvSQGOQgqY5pMG6RTf/swwQh1cBNJLJ0mwYtNROZ/bp4iAO9nG84mPnt/JsZqp/5

yMucwtQHSmhciCXEl40CRJgid4ItErDtgqBDZ2sM8JEq+4gKseNvmxQyKqbywq3S9kEn/ThOi65YX7aF

iOfsEpcm1P/Mmios/sExNag4J+2FlcCMSqpDf70QMR
LiCz23ZyZFc5uP8xA33w4ODCXwHnk1EIceYk4MsQjSwJsgs3zT6hFz+siqBhrIgycX3QP0bHruCimHVp

7pvSat7baBWvDwo2UpUaOqaOMLQVyKNPyNOLbYebquYEyZrY675oTidUX7oLT2CMt5cMJ2OIB018wMOO

K5LBYqTY/3qNJeXIkXf+x8jMJ1cDQhFpNH+l7hl67T
sqjHp+Ax5Le67MhHXFhaBRggAshm3AL/sENcMfhSVL6kFTnT1lva0gnyVaBdhpTm22NlMiZ1O7kUQgo8

ABTHLYthcUZxjEHiMIcjDmn11tLr22OW7vEU26UVvOH9U7AVNT61j4nU8rW2RVgu0ag1ZsG5EYBflr8H

t4buzYK1l7MRL4wS/jj9R/3k+Zu/Vdm2KMFE1gV9//
XyixYWXK2NIObDrch/l2YZG4/VquiCz250aRyOAvs3aDuNyRYYYK33EXkUWyJqumjKUL5WS1pJvsNh+1

OI3/bXDRK7HW5BafoxYdT2Nc1oPDOmUhUhU0Ntn3xbVPzsn+sddR/JAHKp4hJHRFfEVkFnaQPmfXLpDO

4apAoFJoE0gAZBvW3rONOL7Empx31OWuHTNJtzV1y8
3vbfpra0MVblSWAO8ZUia1hSUMvHmbN//4B2ditkW5ph1ZVDHXwdHUAcGDTyjqeaiFYtp/SPHwVLPynK

9Gy5ErerKw64H4o9hFUT/CaLOd6MnStpnjX7oct8mliXFFn/35+Aq4GwcqXv27AoqXltMNg3hzA6hrkr

myaC1ghHKAHf5Nfp4hgSYDJ0nX6jn2uoJaV+3/E5zn
76Ktv0+IgKCC00JWPer7IP4jpRGepT+0NCgrtGXWrCUlu+n5Ydb5bafNxdietwAIe+QPknuO6TntK1Xi

KacEiL6QgTCTn8DMHb6F2SOoGckBtjYkdxYJtmyLW8xQ9Q02cw+Xhvsp295OtXDsZZjdra9Rr3AXX/+o

0CyYX6ap5rZ3/QtBDis7HfR1w3W+idQN/6BsdMmtK/
FMv53X6x5EdvGKpKCmp4U+/lImb6A+lmPMMSxmhd+IrLt5fU6IWC6VDgFP4rlrkDyA0D/x//b2OwKYD0

186GOMfBzYl0wk6F9Qxft+ZhGTpCmRmNurLooL5ZR3EvA3BoViwtZ70xH22uxN90O5GZQYmGgIef5srB

NQCpACZfYeE+BoKKnNibCajBA45jGUcVMcg9rpI//0
nmXcyShvRAm0B/ozjPYk33gSb2wUd3wcI++TDdy3Y6aagotURh4vqHrCJEUzruVtyQF2RHf9vF7LLPQB

cvQUbtQ2N3cExnlUycsbWyyi3hUcqrwm7W9q4t8kRNKEHaSfzzOGuu1GRsuTHMT1KLssx4m3beNsxQ+9

MyHVABSfgAK3DIt7sAzJIGcLqujJv5OWDNiUfy3a2O
yPyUDAIhaWJ09j79kU2Y5Knv5KpEmEvOBDWF7AgWZKExQQg3Hs0Rbfeao1pUpgw6rSWsfU0viURnxxm/

QGEwv1x306xULVpO6r9wdkc0fZFz0+8k1XWjTML0JvPAymDhuyMvA7yaud9Nj6px8c4dwtUZ5ijyOK4D

8JZPJEF5avIs6ORtLRVc5L5qY13TxJRlOE21woBSOY
WrStJ6NOBrQvfpSu/SJLwI8nhpd6lbu/alAmHHp56CaxiKw8wH8KIaasxWfKkD+9ONl+fL0DP0RH1/V/

hYY/LT4Ut3X4Q4hVGdKhShKlsBWYXJ3y440mn5HMv9eSBxnmLLXuVqbDpMDndrhaF1U7m+2tNmux1S+A

KHSqUmmYI/jsDfIMrz4v9PhyndJS9BpyH62GwrkRFN
f5j17lGQIqi7jTXaucSIvmX8wynN70+CeEzCtgFWpeMiq2du0pjmPfiVMwRC/xZm36baPELQto5tVB8I

2XMEzM8PQgVDekrCMAjBv5+OSGMNsiP5hT1yFYcTlBgVpUZTqg9tF78GY7UC6C/PltYwAcpm/9Bfmira

L5hi7lXv7oyNcPL/qcDmrGCEvtxx1sbv12j5qiSq46
A+Oxm/137LsM5yyoGVbbCwZs69tQ6kfH0Vr1cTsFeMxpXYm4Jzonw4Gq7+joAB8yQO/fYQnAtrdW8KxH

gwqWFmsSg4q0NQ79u9y67z8LMRm98dZlEU2PRwID2uGcFhrNw7JQNSFgNBWjt4v9+wEXZx5ZftEcAS4Q

lkZ553BiGLZ+e6baVgXWeVcP+3eEXBhWytM6uheBuo
e8OxF970hsgpUBf/bzeRvrLQtQkrvR1au2OlKQ9A+ujk+7+i152ZKQUocf4xTFfLsNOdeaAfi1ClBFB3

FHE3JVBNRJXcoZWaCAl4CAabxvhGPF7vnppFRlWx32rdq/f368H6X8xK+hoQZHV41/p37hyGSa/Gfwlq

fjKf2mfF1RVdRabS0BPLXEfJxkEwhkABo3LMb34I4F
XJS2T0+i2t2RLEE0gPhlhboqF6YjMfQ1XfRiRsn3qEfJdqn4T4PrgFbzdRabe1YfeAGaQ8Pt7tshvQj8

n2UWY+MnQCvtC7lFm0nFn07wS/28on49MMJ4gmdKo3Nzcte/10BGma7rUfumbZPRy4ePnoRe619KxF6u

aYjQpnGupLT/Lulr004iQq0SqhTyhUk2ZpZ5JBDy8D
UdTKYqSqYhD1jJ9xoPQki6RRaL3BF+/qdZS6JueFK8B1Es4haeRyVoWb2a3lp/5aco53eyXk3db2oiBB

EOZ9Q5E+6hZn/OeOG/bxls+rps+3cdOM9QQc1b8tuBXpv5jsX5JVgVrtc/ctLIHSFK36ycVfGml3Cbd8

wKCesLy7iRMujGMFsJUyWfZRgojSur7zgIXXqP6yOw
mwEIucK+ZbCUe7OQEa35CLf26JtIkQ3zkAi8kmp4ZK59R3OY2qLPpJkcytz+1XbifG+kdjwAjp5r2A+U

PkWfK0z4/BYyvmrC+MgrRnUDuj5u60MOcS3fdrY68X72n+UK1xCoa0/rhPjre/HrQFluovB0XA0xALva

EDPvtoTZa2n4Kc7H/qQ8C5CGn4bH/JnAlS9ONsv9Yb
ZJ61upXRxZ1ez2viYmg80kd0but7AMIZ9TXFFxVg4OXBZ6KOgLwSmz1ntyt+y2diwBGJBwEevZLRIQiZ

7mT8H7o9i70hPYwN7QNf5cNc6qh1dytw2xwB1Ve30saXL56MZBRxLO0vfdcki960uG/JS1Q8e94lYA8O

TXEzEBgo81dN4eskABWSEBiVkYc12n7NMSxtG+5Gcu
BoC6Y83LC5vgRPYggjkpjjvnGx9aX5qG+Hn1epA5AV/eGT894nTamFMUzorny//kJwDyYKk74ZddfXyC

vmHEjmgc+Derrick+QWw9bryTJgrkM6NcQMmvSZqGZEyf31G+Nn/O6oPpe9mTEo0wybTKJNYhNq1OawrotUA

XVTKFp23hUB0Gko54snV7sxO2t50XYoBB4+o6Ta6aQ
AHOW5+9sOrFwfJq2L6AsHfLJ4O5VCrPQR/rm4shLtPr/+xg3yexRX29cVWY0QJH3xr7+f+P/v5BCTQXG

QEvp1y23EZ60ZCO3Wz2Kb7mskt0n4i+lJteEhSjdQkPG46NKjGU7WOkp/yGcw1DdFMlf8nCJMz8EeacR

FjYz603j4TEtWDBB6WntArkwxbNN5ZbvWnWqwMV8oj
Ab5cruUrKCcuhatgz2xuYW2PsrhFkmfiuZVPLYPXNaFvzkpHQvgVkNb8Cvxk7mVG/RnDQOm1dSQCmrne

9Ivsfov5AvYCnBfgy9YLO0fE7BxvG79EYxfACwA+Q0qy7WIiw9vGsMhWAMMuo3n14yKvMzEGZLURkGn9

Unjr5dKxtSUbR46/P0ye1RojT6w/waiiE4ICdztFhy
R1ELQ9dlmFDbR9yzDlg8DC64IaadOUvdWDf1yL8uEi6/nmALqfWI1cVGdYZvJmKtXIN82htj2rpgDv/O

355hlXgdG6LqmbvmTyccfs59DRJLCbmC/yzHMvdyNc7iVDOF82zcxV8K7cKANPS0BwdrLNw6HxZzR/0b

KPmxJhZTjE7plMS4kTeTSaLWmgDyWUSnn4NnvOSl5Q
fNkWn9hbswpLJa5e9sr64/h+0P+U3nUFFfeLYcfNa6OVb3xxSqj24M/5btGk+8VjckXIcxj89pAjOcOe

CPhTRotQq7rTaHTZaCamIQbeiICCzmhnF2gc0ZyjWykhqN50d8Qw5MwYnFvB7itOLBNktPub6oTMQ1ox

km7jkANRM7gCNQjehNSbXolpM3V2R+0JeJvydeZGvw
x9KOoa4peFBw3fiV2pTUXC4lDiHSmnrVluBRp5cOe7J0LAehg1M1yEt7jj5uvrgB6xVQsrJj7NajB/6H

g8NkOO6ZLkP311DQPnFak4nvzx70TxLgF2Uzgs64HuztCyYC066j2BgQwNHAa0UwnbcYpF5EUuuWigvo

vZwOX58Nm0APzh2G585kesM7btE3rk+GgAmYK0PG+M
GRP/zWseJVdyVEV2/9gDOsbQh/TSEoe/9MEl0ZP1sJu+idffnw6icBXAhio/HvQxSliUg5gjvZ5wt5Ac

nCifn8BoWvHHg3gNVzfNgHpZqcq5jvJOXx6G5twJHHX9Mq53/1sZ6aJrtf8Yprw86z2rDlM68HIGpgbY

xutLi9Lh6Sju10ZlU6eDYmBGtqucRy5FUv8I/eWvCB
Pqj+k5hDWuc5/x6AhmmZ4KHgujyfiWrqC04VxAz82W0V1ehZAwugJRUQq5kAP8/P64G7VUuI/sni/KhY

Z3qgrssFkSx4Zvedgha797Fj6MrqD41XPQ+Z52sqxxAddMDYlCzXySJrYnTT5wGmEqtuwAuKDgrp/S7K

8qqNKq7dSdHF5LoIfhI14lETdc+kxngV7wdo5RP4p/
2C1RaIsWadE/Q9gCu1/IcZ8g8f67BEgEo82YSQaSkjOOgD04Uc/LDjuIj/+N0ki4inXJYCyNZ6Boarlz

EUTzRZm9TCV0YEqQ3nqK9FcQa0+IdAPXytl2xK5D/iu566IOcoJoeeEjNt1YADkJqQ4a/j9ESSNCFC/w

b0+Wz5At8ofK2PtcdbXTqufEiVN0q7EnudQ0Wxv0KL
ba0NRXr/AK4IsnpgKDc+2+7onnrRKDicGEeD33l7JcxebrQ7nQoMeLFs9KOtYYyuK7/G2PUHpnH0LNwV

rdqlmxeSzKUJts92QcgH+rAhMYxM3nLJX8uRPr0gmsIJnLOgk0xIj7BzouuLE2hu8em8Gsi+2p8Zk0b+

9WxIR1WbNKIE5aPgExIlWjZs5zl+sZngscYo8VAS/C
uaEFalkANmSt3LuB/e9Mf1Zk+2YnM5LwjC0Pciw6jXg6+G15zbjeNzkkdrirCTy6YEgtpQzhRiuAl7R5

H/oBfe/43/D30DB/iKVCjJuB0XizI2ttGgSugCsLwqxttPfLd2oJ0A7KSbZoYoAEyHhWpAW4WR5qvGOP

U6cDCgt3xsh5fjfkkpwa0I9wOrr3Ve99RHgVzuZMiy
DYXFcQAl8xwUg+l3AK3rUTd7ZMEzWxCHwrl5qfXyFIFHoslNAH46Xv/G+GWGM07ByuIUKRwsu2+z/yzM

hMwwp7FLYGy+g6PtJlhxd0I+5hAQWdfjT2ef1bXqh0tS5zfWdfWiOBxILWiOv5IwEBoqx4Vg4l30+Vd+

dbe235txFf6MVFal2/3FGbSdqPqlTPOeJkXC2AlEaJ
fr2BAkbpLtX4mumwBb1ZQiH0xzH/lsWrq5tPiO+VgqFX4BXoxBzkXcEMKgQjaKwqAJ/UQJw0S0lmCZ69

e/XaV5PEcxhXlOnh3QZFJFPxP/3uFiavVgPsujX0emAKQzeFJbEN9G8TlAVVEAa3xM2bpEJ6OmyvwZjf

O2VPC6pJMfdves3yfKvBGpC1JMPTB5SLAeH+H5iFv+
o5TSsmHZcUvU/uofsHG6VdPAOpNCjuD+J4ZIoERRdF39/GN2LFlHpD/O/fJ0HEqHnzWRbXdvIR5BSgPL

aP/A7DG0tMG9PZqVhMzbJzeOGcjW/zjkUZKCp7sCCLtmtnYX1oBk3go8ezkAw/wxP8zgv9+fy+yF0Xkt

f+soaphtRFEJm0Ex12AuASmLCydB5gcEeoGFSSjH0H
5xus99KS78aoeCGNyNGuAI46xPq5DtyBCsrVDwAsWlZ5qI/yUfta62t1gvumaXU5v3hsm8ZLJleMSFMj

NEQv+vNRQz0V8fvAE2xo72rp8ZAJvmxlsL6XcDQbcznwfmsC05FeBTmJYWexJdeHujsMHK129SY/VfM4

GhOK9XEPMhzxxyETUkSK7kwQYEpWCFL0PKz0GlqTCr
q//MUsQNE/Am6AXjqe/UrmWm2KmxsT8eMBEGtrreNXw2LUuRZGfe5D8nQyOxTxQC9t1BlPxANbic+FUM

pF+MHcqkc/Kjsl45B2Agod5LVH/IDgJkXYwgk00EKC5meL9s30z+G2T3lhKCqtaI/EjgxPnWBioLp6zM

rRq9lIC58ej5KaWHqWY5EW3lURDY2B+9ESzg6NyAec
wwxxuFOunrzUY+Zogflj7Ia+DADfUUx+IhG4F3dPHb7wf6+jHCvVCuIU946QbREKtfi7nAxFlABwhU0h

V5+Y9b1LE9KxWD81Chd/LIc/nUlAHivnxA2ztqTQ6qeyq3Gt8LOQfIhVWHhXuUe/yixG1zdfCMifKnNI

l05TqNIoNmMKPgjNMHOqptkMsEbDpc3u5IgCdHvbU7
BNfSRTWefy2X0Mj5Z9vTBZmdRSqE0lb6G8CKXDMMz0b+44Z4xiVP7yvLcMtZDyAOW9U8HThzyAykgjbt

jkt2vUCV2u7WlRPm716ZTnNvEq8UOkl4P1Hv24De97j+Bp92udzsfUWYUW5ORmkIwrRNSV6lStkxxYEB

6Cf8jE/HdoongJoTtStBcIsNlQrYsGvLt6Q6T1Ju4D
5ue0LWMiQDTJb7NnDmPkfR5GJxPQ7tt6VBuu1PJhEhf+tHoHJV25oOBRLuqkoc6aTIF18J/Q1sstj9zS

L99XbvD5u1Eruk90/wpA/usT4feBRhc8KDfzvqT82CXPZ+EIrjfQNW/5z2tlGDGuY0ggnW6bJCHEpxO9

VOS2ePnk+GhaPQsz//9Lo6rUcHc2r2HA3/I9lbRE9G
ZiVQjE8ZB6Qw9ZqAkGPv0JjhgsaDVYfIvKr+s/izUkyg5ih2GRtQY2oe9yUUTI9QEtYdNXbxNIf+JvdH

r9n1TkwcUuI4caEshOMYOexkdzuZFyIACgB1fJBIcBnOQ/CdfzlKnfJTvOHEvqe+HFCBGan1ln/pYwcJ

rcxcncMz1LGs+gWilBZ9uR/CLAJHYWMUF1H4sq4A+F
W/Ozd9eQUpWzAfIFKqKWsDgvN5zhgjgPOVqP1f/5MG3v2jg4506c6FYoshhCol5Ou9ILWBM63jrrMtdk

sPbSYbn48uQFvdJ/R3qnwflU4Y+DFC6gxvviwfIUVN+m84xm0gJyz5wN+8B+WKe2hMO7aNvYpbGm7cj+

K6FhkiFHyEo/PNzvTSU2JF1NsHbRaZHLs8qwUfd3WK
ia3rTpIcDNoKyhY3FefEUZx0m6agGCzh5YX1nOuzukCAuv944VxGBY7eZBWP2W7SXOdQpVnyfq80gSFp

qIaLhfvjDu9wHZrvj+fAj9USXbz4+hjCjV9o5IaGhO32JnnVZEUir75I3UnTABmxdtqVY6gS2zwB5wxe

WoSsV+E7cXJTdrlJq/6wjkEdCYr+D2hdgX+Cy3j+ql
An2E9Hh/jLpBw7tWYwZM9+BPHTHfbp7ammkSFQv4Y8SLVkZyvdKjPyZwAcwXBF3RsnrsCi6l7APwCDTI

8mvhoRvTnbIl++KI7x9Q5Y4KgEp23XunxLjoY9VEl0w4vL5oBWntVS5JDkoDiY7U0elYa4z0EC6rm/RC

Gh9p/N4Uvu/s4gVVLvz0fQVB5AuRB+6LztaXyjudJ/
iBwULMnvP4iZOofagF5qqZzurKIm0kHGKj+A8nRwFDvUCoX1ukF/8/AvU/+lP8zS6c09l0C18lDqNMlF

eqTa8TL9dr2o4xVKKADPnMLdO3w6UfgUFB4ghR5y1mBORxG07oWdcrNU6GPpHBJtNx5p29w6aY/USi89

rB8e0H46Rz1JqkbG6MwjHIQfLtl06e20B/e6kaVHoz
K2LQTJF5b8CF7Zwsgs04JmnOBzC/Bs8SpCfGhVtEI3ayWWFYbo1CuZwfZwe9QKVjsLIF0T2Wg9qZV724

YsHUlWTnlQqOJCTUZS/L10BY64APUc4hctJNO541pIWYh0e4cqycuCwRaE9G1F+7C8Z8qm3kaH4w8Xsh

whOCKriU6NDh6bH1PTCq3gPDqOtzbUr0n4x6yS1ouy
f4bdDyneBsxhDV1iUq2YGe43FQYqxpYFbxGb7eRNW++MR24C1tmfbJQs0pRoLpdaB+oJXUvAGKNe8XN5

j7I5J6d7T8YTx+NuMzL98530wfDC/qrxImGTfpp2be/Z1yZSjFinslZ6tUnMFMt7gKP26M1WvCEIFidf

qiXFgFQlaWbeS6ggQiQw9gu1WjVj2YI6ujLZ/G1v/C
UMEG1sNc1OavK+Z0JQMWzWLY9JPhOJqEYBPp4C+85XMhwvRZfvmbxDl+ftuAvJgpA2sxgkhHixCeKZq3

WMaoJ1JQkDzVFt34EBJx4VngM22wgLj5XqPe1hm/ZUGRA0xkzGiDwQ5KUK93CWUXYuFgK/BOwPy31n/Y

NxrCzVbVwZxzFH9K7Cm02w+ZRDmSF94iie60cADc56
eYSUvvqXS/t5z1K2ieRYKbIpnBeQObwluC/lDExe9W287macfNtg3unDrUr2Krmxn9vKnBC90FX/YMmb

Ydcirb3lIj38OqLc7t4bd8FZpRk5VnsdEyXbB1K0uHXa9lEAE3iySPhufiy6toPflQalTXz8DTrx/fDE

dVGoiUBYDzHXjdaf167M+hTJ8hE2f5UQ6OJZ+f1dvS
Zt60S9MQm5m22wkHwIpNGF2512vS7MlPdwA5RFbdeLhAymg5Nbyn324QfeTZ0dwf0bSmcq0HhqVbtaMI

y3yid0m2t3pnIyxUJZYjfrEmdSMJE7j/aBQsr9EidXywJCy7U/ALNDJ+QraGsnbjohUK6QuDnlY0sHPY

iN1mdtZNQRc+3cbJXTtQxbJGl+b0qL1Yin3NqE3ELg
8wPdcwjyjTC306BhBHCyRvvH2Ghi+EhHCIKPaAwFD/d29dNjQBfpiKYOwPWNeuCOpKs2wKklm3myPWTj

i1G0ZUf4e+U3s+Bo895W1UGk8imjaDrccFTOItMQrQRLOdTKqbp/cCgEA7vckTZmQLcISDIo9PH7AEDO

dGbUKr8qdI8qYCiOqIguwwi/qL+ksvMNp9UQrA58AA
ozdnrC5K0qt8PFyOlfwm5qtqdNFKM7zrk6k2ZxlHjgSuj+ukEKNEqqgraavZlhch6ig4iR6NOZxYoekq

HyWTZTro6sQ+9K+tIOFMWfQ2cXJ7d+618jmvWT0XY++UR4cdV9NLNb495MNxUqGWYTwAP1rBf4RTVSWY

tCMXjanldf+zMxZBsc5XRMuQYjtY3/VM9y5A8emhD5
GISWy9wS3QpVnhru5f5Q98MUN3s0hELRXmRv95xrpa1FJO5suwaZi9c76r2E2E1kqpnFgzZfgkbic41W

5op9rb/iCI8tE4BJ4Y8xondIuJxsZz+229QUziZDltSUf2ZDD6FMUhqHgqM351W1k7KAT1YiMVFd1ccv

DL/wvTOXn6FJI1dbBLAiYf4WF3FtVrVL56RZkCGzLJ
2f4mTOM21puzAM+SSZgDSlZlyvLk6We1hSsWBS+YzzqfUoaZ80RJRDp9mLpVmFt4RYP2xrcnDG8GnDLQ

f0tcpEIpt47g2sSyrSccYY9fQV7UI9fuENy6y6PoBMZs4mGC/qtQ9Urp4e8wkg3qQGBZkqFNn0++Nx0D

szLw+9o8nEVcvyiJHzQwqVoFv2YuUGP3f7xwJV4BUE
4gbxiQxOBJh7IYn+P1uAr033fHAa3zXhBgiTn/3ajJnp1ogNTQFoLzecwHBbP2WPSnl7vM7zwORXl0+w

pa8pwwYr8GAWyC5x7NDBrYkdoJf1S5fkM52M5igpJ/7MrVR5cvzGJ1ZMcTnr78nRs/BHizpMfDb4nQDf

kwQoky2nA/h3JmvicgJ6h3d9xtJguHC9+iRhzkXsQO
yPWd381cllq/gAj1r8qa0Kzqu3prk0in6k5PcYP/lxqOgsrdV5BZcRVd/rxqpqhovLn/WiqQapeNVDUg

WGfmfLIxgu2GU0hhFs5rSxb8WN3X9NDtuFyc5fAJVwfAfjGHjrbP/pZqzOYtFlat25SznK9nZSO/2l2S

U0waqD9XiIZmzgY9jYEL61K6Z3rs604+QzBAHP5sXy
R0JveJ/plHzDfzE352CTKdwqSocpLFDCBWCmPPBnmt7fk7FMrDNRZlT8Wqmi1F3Y31eYkWH/WTiIcHyv

/oZF/eQcC49rNtXaQ4rjw8TZz0UYOB8pjhduaBMtboZnevMjgs7RBfLNLyP66QN8q85+bDH1or3EEn+j

D6g3xd117E/AKHtFBlkwka9v6zEGcIKE+uDmO/0s+W
DGdZ5JWqxlMzJfWimrVPsGuzvZWLKr8g8OM4zXTpczpDSWFIcTzVxIynYHsye+bMKkAKm4MH39hrfWsk

wFD4u0KxNsv4t+qMEw96tsr7U82CQTHG8WOo+koxpFGyNeUM5BrAhm+lGIZek0CaWw5UyzQFLwBwwQ78

KOLQl+J6HuiQI4oGhZQ+XqmlX3okm5qLP/QIHDhb+j
ptfKReKVVLFSHfWL7rVNiyvuDRPm3IZ2PnjArQz0Ut/NOcGtS6F2+K6/w7W4HTXxjs9pdSpQ85V0Ew9s

zKWiVraPYkU8cM7h+NIjF+zw46t7uEnwKFWk75U+2fgQou6MJ32p5mN8UiPlRrZYEtAJeSO4wTQEzPbg

OpSvEHUtOpgeV6heN8Lv+D1jdtbWhOPwSaIqAHqxGg
QQv6Vrb141TCtV8eRwrnOsgXRVWkBN6TETk3vP0Rh/SWvYmYdMkqStDfJpo2MuEMg/PnGTqLK5CV2zWf

YZe0ZpiTo++OvksJLhfOnNRxcIW/luMRG5a0qNaNF3N76vK0Qhbs4KFhrtZLpBCIDtiPt4hBPdLVda80

z6D/ETaFMvaZroHY/4pp1bh144qEpAY4u7oMbkdzI3
8/kmcfmANahRU7CY+zs9YAbbVetI5cI0fKdu3mtx/SB74b6A/A/EBwfYHHmPm5alQ07vpHEyH4XSQb4o

rJSOVZCfiWn5QzE/mnsg1qTS4eCbWbMtQUX3aQj4xqCrYj+IMYuvugEWk679aD87gN1nCSfa4bfaqMru

jHyM6BJkt1qW7yruqWYGATiV2PwIIndEBVdwBhNU0o
q+JuR3wOr6bfxQSybsEwc2jH9WwvVLhLSG3a+PSir2xDo2nnEQ09K1T1qank7yHij2lXvl+rAQ/ddpWe

/x9upTkJMLjx7DAAt4IBHm4fHb05ySD13iUVvICVHtepI4mMsJsdwNs5g2s5R/1dvkEeKckcxfPOTqwj

Cve45RSYJMsPfloOCbBFspL/sshKsLT0KCPP1qKsEl
FixSaM7JzuYJLfNmmidWz9s/uk3YtKhz0Lhj2/WdRYsEsTQijHwKhVV0CFrFQHhlkLot95hw4Z6duHSI

7gMteRhYqdyjAxCX8PxdjohpHHjJZ/CsoHhJ8aJZjyZkbCKYPVoFd6q6ERhh0PPmrMnfx18aJsJqkSHf

G4c/KhP4tua98KbNtSRe9uUvz/spBoq4xEnI3U5N8y
Y76KginND9KAQYdbcOK0C0QIGtx79AZGib800c/FH//Mq0cW5/vDPCNE5B3oEtFzrN6gyeTyNoCuvoJl

Okz65DAKGCaJMLPAWkeNLFn6AIWguIkCbaJ59cZaQ3JKA46/+4q7U19yUPcCr/UZeoMrTCY/b00+lSjd

8tfhqVSLpelGTexky8wiSyr5M+1tTZ8MaR5x7R3uM/
MSFFEA//DDUhCR0CtOqouwW68uFjAvACyo4t38esLYHDkfsCbSj6BdRpt+z3hHVPC0mgK2C1ckoQ/N7s

029XkJs0VUiFbAyR0nvvhqf0teQGM5/WumLtktqfnmNekyuq3dDxsumjg9HcUkDIr0B3ZEl2i7baeMwu

c6KKjQTLDNhtQPNQ7YFOkYrNwUrGX5rrx0ENCEUX32
N8Voh7NEHnaO/s3PsKg73/m/fdQlIFIaWJWoVxlGS9lWhmEMZuaO6vQ6KrzkAswVQ7/uyE79UkOhCCXX

P2arzi4KtYH+DeI2xgeb+Vgov4tGM6oyWfZls9Xp6Jqb5+1r59dqDzFNyE1R1ZQqMydI1gwSE48SGbmg

Gcwl6Bmb0ixE3y1DpAn8LV8T9jpTwfRS4jGNPn9Sh9
qsOQ1sqzWWyVWTEh7gIL0vuemkD7LwOFpWpS2YMxDHuRksv04UwDCkTRl9m6hXiV+FZpjTLzkqV0Azui

psiqP/UnCw3fe5nsKKUjHLP31+pUgsb7QmbptA30bqWZGwCpPgmvGesPAYDB3fPYXd6P0t2fAmE63R47

/NjhqWFq9uZkvNwRBN0TsbAAM0dxjDASKRitPn3NV9
eVetFP1QN/2haTAhIdLxHrbmRkFjFciJgRXXxUqLLFP5JxG5Doq90zthB2XlxEzH2zuA23fKqmviMg+v

oYtbci9JFyVquuWtbEvWtc4OQHob3O4oyfI1t7eTJqZNGOTrutYFxnVWjd3CtiCzTMrgkkz9330JD3pG

mlFSzbLtN5XIMPnLZ0x6LZFTiH3niY8h8m5jptsbEh
rNUUGwb25B3A26vZxsyMGZhK8i8mBXXVO1WbvqbouXnbMlyinfSJaY6Rek30mK97Tul2hp4JhWcijMpw

5RlWtKG1my+2UVX0iFS/bmFfVt0daKGeLxSyBnY/6YpLuY00nbzGHT2Augs0ocdn7any8fG3RhxlMrSG

ke4lXVFL75Q83xKWo7U9WRJaf7YJ7AIUOzpvPqJl4W
nw1z2cAi6o1B6rBC/7CYdzV7zAJs+GwC+6pbV70PA0W63K5pxq/c37CdTk+C6l+dSSbkHsUo/X7FF71k

QAxQLcrUx6KQ6z7AIegMkgiUrAzafI7I4ejD2P1u61HFW6KUKJSIyyMFo2UMmP7oUE8IloYBCIlp9yby

9SvrpmBJwdp5Ez+yqu0UJYfV30+C1GaXmsu7tTZjjK
N+k/zZLVtGonz+ZjFe8+EaD5VaV8/y3+ivlmg3KmVokzwSoO+/RlOi+Wl1Qkk2T3EaYzy3xFZmz+AnBC

9R2n4j9+tW/mEoEVwwlTrRZZEB1DDckBmLvfA6s0r49eq81g+txzGMz2M5V/K/DfG+iY7Ahu5evnq23L

Iq7R9JeTlXn+wESs1BVNVLhB5b/b0nhmgsOKzZ78Ol
tYUHnO1NOz+pIdEoEX+bjgI9725rHF1ndCPHTPglTI2DzXjaA339eK0vbYl7mrU9W4hFjGj+Ky+GTGZy

a6f2//3Dihwq7uDv7RAatg1Z12qg/9o4/7Yv/hzLGIcBuKbtvwvHyjPLT0ytCM0lG+fszokAlSYPBp4s

dvpc5Fn0JeTXWpYeRQbDd3GOhuZSomrv3Iq9XY4oCs
xKGkJzgErmO4/MHwrwlKkirtttZocQMgnKYogo29UjZ3cGBQZSProO90O7OwVN882zq9DUBJYKWTScML

a8dtvwPXr0durOIWIfnexNNaVWKLjdQ0uSmnrd8L4+FI/ukogDbMBgOivtK5k6S1ZF0p01jukKxgzZXB

kW5AzppQwLnajUXaOtV1veakYq4UcYvkgr99SBm+no
qSFu706I+L6bBqGd+KNKtdYi1fC8IJT21DnZW3PdQk1x4tuOa9ly9Nh9NHVtBvmMf+1DNFhMoOF6nATo

epSzI60oNkuIkxkQ4Jks3pYQUaUpZ/v+G42rP9+c4SA/jJDIOCMIId/XBv5Cphk6eO9LeK0x+MwCInJ0

fG0Biy4Ya3i4cw/GlQavqKms8QqanEXOQPECCrFTfS
D8E619aZQ3uKUpDhYqpF43f0kWWZI9HHrOjuc1xDwS5kVOLbubcyMygSs+x8NMlNHs63zhAa+/gXUwGm

nzqYkgQoPVyKA8tlSGAXeh9BSKkPXtlfPK+SxXzmiRG08VE+ZP9YNYy1WaDbbWNH/di46m0V//R4FQ4L

qui3D6j3DU7ZSsUziLBR1q6eeoBWjvnXl4QmEncxJv
Zjr8LCU7EFEhPnZ6Mwp6pWe0SlpU3TvY2qvfsg8OotrUvh6ymsV9pDI/+K05/Tn7oUkvktqQtzJe2gjF

/UUOQ1rMjXIWbwE3m/1EaZsuDzgnPkkcqaO5c/Zd041xZpYkzcNgJUoPHpJ78QijDR/ywMJM8Fvv7zZx

fRaFvkH39oFTFb25SHW4aXTtmRR4tzoRDL/AyrC2V7
uohXbAIYPaOTMJauyFm04R2T+aZo6+Myrbg8a7vWUK924e/oBKMySJQsq83gszfjvd0gNDval2gaTxKL

yTDZ+ljmEhJAhy7QMu4fmSy/O04g6bt19IVFa9FUWLLQs4gi6QZ2X4BImsp1AZLtQQDptsztDvqf98hU

0M77viX8wbV9zI4qkq+Ra9BJGnk0BgCyr310dUvBMP
E4gvpeo9II4T93YwrpQ1IiXKv4oyh7biEm/SgZE6tcKyNQGPD629rMDDU6woYbOnDipyo0a+c3fr/d0I

IN4pz8Fukg52RY9nhEqsPR1hrAbW+hVBlz0D+0ouM7XBhTLMygmMT2LImSCH/NhgwoDr0JBZ5M7aHMp7

VvIl9sPDWUbUIe4lj75YRwW1o+kYGUM/O1G7GwRrLk
q77QCRoIgFHz8Qh3oha1wAK4odNLpOxueZoQEAeoysYKZ8sEVpDgSh0kkg4II6YyYZZCkuLW+PE9097P

m5U+RJKErJE284d2TIUNiMqEideXONqFGeiJjK8laGymgVSuu3gwOrG/6LB79A0+nomZhnq4wwi464LQ

MlkBMXohGSsFy6tYWU8Tqj7MLn9g9CWlSbheWpZrTG
t/2lHFkQybtADiTvjF9+TLyErF3/uSD2b0pV/iDhLHh+7AEc4rAE9SzifKZV0564GK41tg7vFY2ArKH3

RguByG8llfIVdUdsLkX2H80NT1rIvzOdTZ4z35v/T0zzzmMLQFo96kbLXNfrqFZGm42z2Q3Ly/jjv1Bp

++sKVeQWMQ+czz2JfLSTwTtgjnw0w2bP89v4sCIoBq
mzba1WQ/ebXiiCe2RqSyjddIWSuNp7ha+XggW/ByCzFTI3jZNaN5Jqd1+oYrWQw6AKvyae76hBgQzzHw

sB7G7rY8wiTR6VmNjZyUf/wxKFzoYJjy876ROjseS5pmYzJc6Yzqmtj+oqJFyXXVL/65vjFSibCxXiFR

TdEkH+yNF0JWRk+BmLLBJ6C3ruW9fDqDyzPlCRbATK
qzE+clrBzm3g5D4ASuvqNeJEMZ+HqglhEzSUV79xDMlOj2FrO///UIfVKhgFTNkxC8IsW4+ToUwS4hVv

YyD5/fIsjqRdiP/sp3SyweAEP1S6sHG4H0jG/kDJt/msX8+AzQlUh5ATov0Bi8ul4iQoTpBgp07L/Wr9

jesQcShpS0r8aS/+2jxzbBg+m8mz48Vhk3b3IJ7Yk/
1wZMhhbJDquV1d/kU3jx0Q3Yi5iKLWRMlWHwDAbhe7FUe/6imv7olNnksOvN/lIHdm4iELPeu17zZkv9

A6genVZoQIbSrrCJLfB7/dvBuiXK0eFaiT3Xb9/WJIzQb/K2EklqFqN9CXJJa5Xo6YWNezXAnGQSVI19

SKtSLa6iYum+uI2kM5avmPsfuYiK4ZG2NjcfYJl2hS
52WIbo5yO01uqEXphK/YYrURcGNV1TcrJAY4jccA/fJFXTaKHTxumSUrsf+x5sljlqyuFcAX8a9DM/Pk

5ZSaSksDXqEiNcyzhcAlpGZVgZz79gK9t9+M1nuRPRE4WfgyZkqL4jdB2/hvx/97f75f90WVjoQ2c8a2

Q5xRkHYP7BAGBeqw7Pjpgk43XIaT3Ef3chyrIy3lQ5
OePqQcwEsSxDehi5KlVPZHYJ4m45NDzjY4HBtF6f4C1hN/dqNYpoxEl6u16+RABtkdwoMlMO9qqDj8Vh

OSL1A8wtg4ktCQ7hrludwbtppHHVjWMtQA5Cut8vWQ2kCXB/i9+fnQmrCucVG+TyeuHo94myn07saCML

yNX96VrOzZmhTUfkG+M3kdycyn+MjpvoHQbOUO2S5I
kWNVlYiQsoQ+7RdrdRVx/5jy3KaWyhkevquDqhF5p8HmG92dn4hrCEirmtG4CNIboPQVd8MpKzzAFsul

EthFkYd/257KW1ziizR8ROQOjKLuzA3yA69UXmHjT5rfkxihgloc41dB/v4mCtWytJBsZ3odHuiMedSb

U2miirp98AtyTmVWXv5mpAEU1tnAIz9uiEUe635phW
D9XyasM1lvhrJo2abDOclTSyyfklEgKAwxuj8z2OYMlQt71aHS/ssgG3n1hMWWOMiokfsXdIvSYsDkWm

XT3UCX/8IXrbVz6unb1Pxs4Xaz9+iOVkQMLYuhN/joZ0a66BEhrp8qRVZkVVB0vnspM2koSFezGWzSon

avjZoi8ghn7CEg+XHcx3YIhqth41RkaiPVLujQtQK3
9jRLo2vzmfne314UgtzX2gBX59QGF4friHrNTphdIEPGyffb2Gd70QGYM6iyxNxo2xaT2vnj8/uWyBxb

Rr0DUDrqjqXV0fZox9wG+wI2QsWvetGdYpTmpA30Ww/lr6GpiEUtEjARNEkaj2v+xmFiAILf31quDSWS

aw7kqiWb2YpqU3X9H+jOFFFbxyt77XkZ9s6mX5dsOV
DLXJ4DFO8caC4ire3V+jS4Y5PNVpvDVR19Zd0L6Y/p9db/6K06V6i+/IMaxTMg1L2xj7WGsoIo5NkQJB

WHyDtaexopLetwfFsJfTanmM7/Zk1wwbpoB4F/a5T2f/rAAqTR/gKYYXwofLNROmXTlvFd4WKuKDmRNZ

DlF/X8Zkg//j7kW9nhDgNuur1Lfx8LRo/DtP73Af4c
MUqS+On4mu/hRP7c0xmwpu1snmkmNVerMLo8KMmUjzKchNLHpNcKnpgtccJyqiKMhiCAPPQNqyl1V/sF

My6YbPdIL0sofpKTi8xc8yFS0QhqYDRJgrNw5P/MXOdrKBL47n7WQH18kFWQ2BToYsutkhR/eFX0g9dt

lZowsgOC7vxOboJv4XeqJE6M9n5mg4wpx2+6HH33uL
cWb+X5sMBvCA+R7NeoHd91vVwEqoXMx4aWx9KHoLoIiINR30/MZ8J06VQi878eBmtyb/L1zNYcQVHq5H

HzxVtDDJAls1dX6hPPVxcMt+ZwktUsmNofQU63QtEzc2N8iQ+Un6nmDJM4KUS6WzQiDFf5p+0cq/dG7B

5yLZYjULWTysgmIlR2oq5MOFcg95Taf7AGMa0/9F+Z
00RrKC5+BWu7kftC4cb/I84B9pd/ZVWC0x+d/bFqN21impf+FracaWU03ZHkQa8dscI4WWXDsG5iYaoy

LuuO5miNvTkhOe/VpaJruyet879sLe19Nyf2c1clssYocsaeaCpPP+pvlQje4XjUDBnTwbDMswEfQF2T

joVhfItqZFM7biF/Q7DVhcfDZakuj1hk/ibF53V9vf
+S/HM9P8y5rasvhAs+cZsRHvC2Mn2wVZeBt2TzzC39MmDq2BvMFH/K58zbbXQfO65APbQG1nZmp53r4v

nXPeAq8mOZcD6kmGnAIKGAlGsA8tdPjen3i8cneziFBZJmetIuy5l45vQKqB1XOM+++1wwj10aCyQBLf

hy5xdjoB+mbQAr8qjg/FgIoXw498NHNpdky0Er9MIw
LRc/esuJK81NOTE3F76wPodbWnetw+hisisHs/TowbNmzU7bD+kBQ3mZl90x2jNRhBQlnt0MOofEcKBR

BqEkZvctRV+dl+GpoNezLaSYCTShM+7ObT5+f+KF0A34rpzA82Kcal1avhUyRoqUH2VzbfiYT2l7X10a

hG4GYQ1bFbdAQ8kp82w/RpP7n3a1gmLFj1PthSNIlX
SyWP+dsTkjPTwtQjZujzOc1G8b6Tze91ytw66AZ0XpYw/R3Keb33tCAvG3Wx+uIaZlcOFl2ZHHJp4i1W

pAeiUiE4RUvvAQ9oI72kdBK05RFY0xbNggYvqaFNOsK1zO8ZLl17oIB4ff+I5VF8Hxo0GywseV8IEp6v

Rc72Gw/AONRo+s7V8Qy1mW1RuuJViOBA8XtU04UlZP
x9w0UogHt6jWhBjmUoPekZJcW0oxGzAj151WvOC3XPvlXhlpavfCqWz+tJMzD7PAA4gOOXO9z7gUmoZc

n5wzrY7d4Hjab46xYL+CP9OOAg9fXSDziKR05jJXE0bihiw3Ok+DgdJOGlp85nnVsQGpJDNx+9hxl2Rt

KGxDRjuDgppZlqiItqUJXOJKfH46XNh2wiJCBjjxgB
CHInBD6FUVQcoRHuyV6OfezyHsCk65gJq1R0mAsqvRsyhMnijj9d/BApuh/Fd7TAV/5knB5PmjjpBdJM

jiMQfVuF6jvvJOnJSn4tFVhn/m/r2sAC7IW5FjLZn7ZICjNDPNQ6eyaSBpmuxYYsurw8grbwKtWd5NBy

iyJ6JMuaDTfJWyLxSrGLhauGXbcYu95c86+ih605le
usDxPGZX5Vwms4Aexk13VzrouBA7r0Wma8aBzXOO8pu/Ur7FJOzhCB7/6BQ8FChqCN3mAmi4SM5KcRzo

o71Vw1fiuzJtGXWIHdu1BoHh6pAnqi1HM/HpRxpDoQttvucWy4ZL9QIUBSHKjWetLMTZqMl/brent/iPen

mIaxLtcCZRH1fLq42h9AQMAV1oGScgPl4dCFg7E7lk
/aRM6YduZoncIZ2U8G9hHz47jhKQMk1R8WRLAEvVwwaDduBW7mtqSzZdaxWlOqCk6zljRlxVHeB3f7qj

7d9YuBFeTtJJlxhara77s+OnzkxNVZ0xWCY19NC4fuojxYK46Wy3cAKN2dbKu+pxqRO8qXf8CW2MgHzu

tQ0NiukNEFO79UBUAOBoX2ltEjEC10xLWH7QPU1w/g
d9N/U4o+iTjV4mnSoRJn/xAYcvmlwegB11uCUwOhLXymluQ0qThwno/6O1UoN5A+fjVD4ydBTTSlvvOc

8VXmAP5pP1Hi99N85nw5y+8dJXuyR/gIh/1Su+eEssyf86tvr2MoUKnJJnD1Na8G95R/T75PkUKWHvMQ

2YcH6QXAVFn7Ahn5f2R/VFmRnQsYauBFp/0hrp/ESQ
oRSzD3sKpf27FYBOgV3IegRkfMmaJi/4CPz20p2+BFIaIVPQvnc3XvPgzEr+IqS599MFPNNUGZ4v144W

Q4LXvzF/Yp0Amnl+2xoxevVbN7zBAduAATAf0Uv1gfkVPffMlLHujplDgQsekRaJ+fpjwXPY/p+wxsRN

S+IKvIwg5F6fYtW3ir9HArlyA7LfjHLdCUXx3XDfri
zbDlB4lLtkYFNNJaNY3XsB4WHGmz+J3lJ00k0YsVfVFchfr5/wZd0/JAbE3AxCqFr1Kq944AdDQGHm9L

ewy+8Ev5GOq6L8UTsN+OjC7Yi9tg/qy6U1RmpA3RuihsTwor1V4m9BK9HILvjwhf9TzIVP/xGQfPGwsl

BUFshPVw1/2M/eaLCmYyng5BRcNAVtBlNpbRIughIS
oybk5r2CTvCLOukIN735wGHRrQNlvSyCog4CsNJhHKgD5TxVB2TyAAdYdaHfQobcIFNEORwNEi8Xx/A9

ctiir/yqAequkf8MFsNJI9YHbhezOuoj/Mw+9gtYRRr/g00C5V90644O08OSwXC/s11ApJN5oe0thTnz

I//+zv9PQDO8IAalN427bfsVIhZKUtw81QhsZ38E+G
i7oZBukl9kMX+bpc2gf+gp2KzbgK1UlEvVxbrk4P5IOE3EFwfmniOnEQkfc88EYq2Ecwiik3SwqaVpgy

q33TYCgtBjgzm/W589tXmEdI/lQmZ9eXbVWq+/Cl2giDz8jd4l8pkrZFPHC6S7YqaA7Z5PtfQ1sSS6rj

mZOcNvHI0+wrGFHOmO2DJGEB4OobpmDnkrrhmbssxb
c/wiKYZdK6zfU4KLZlLfCR5ZTwYmq9SLtLHBXaOMDN3X83PnIdUvivRwauLfG7k8PL//IsQzEsUsdSPi

bzPcy95itJzPM6WfFxx8HaoZmdVWOuC222wcD/63H3u+7TH+jqzbBczZCO0HbNZxiq0+bnsXtkmYOSq6

NkUXnEp/0aD6S32s2jP5fFXPs8RSIyAUSm6Q/4a/li
cy2+xGgF6iD6qMYOEceBEY55brthXqVHOqkrIuhLWajvH8Y2zb5i+3fM+Dirqnk7LmEvQVS9r3ufhnZg

dLM/QkvioRRiUiesGbHmu7xEClcE06B+odQ+qHdg1QrPqQVsZ1lh8qTXfBvFYY47qfl8I+mZYKs7KHh8

2T8gqAVkNtuDhxqAsDt807duMd4X2Wjiy0/7zB46lq
FnD07mnw307MPbtdzP+SupcR8L+fqi8w8EWFdHbXVDZf1Mw1vvfZZPeFkwZarbxtiXu2391Q6zBEN11A

lxTcr0o/G7oU/t/Q9bp+BnzOl817nkvPoJZbkXddRslMp/OzSGOj/lq60sA/Bn8TFdxLV+ZLRCqo1Qjl

s/lteqswXhZQd9rh38PZ6e3uI2F0ZDvO6JMhwpk5Ii
0tLM/LQzdKTAJUicr0xP2y1NQw9HTbNqcGpZhDtTNvjzUNnvRH1/oUXb42naJi2yncdruCc7Ze5zhqx6

w0d7VnrnjUxnfX5L/bUds3BUevSqbw4/CmYbGNQF6D9JRlq8PCd89fFuFpdFyr4QPzQH/wTaGZyHgYUu

xKXtaoBKHwVG13yLpTNsQgLKd0/JVdY5hV5E3214gK
LHSlvF8EFS6NctxrsWsRZptjzZMV8Co6uhuzNOwm44LKrcT5H/tzxEkqt4writPPknltgNJ7jUJbs0uW

r4ryYGJlLX7DQa+cTL38sbaXa0ETjYwE1n2ZCYS7h1Hfgk6V3l2z23g48tib93OKKDjyi9d2HTLUREuP

Jpl2E1iZgO81Ttes2mwrJ6K8o4ECkFJboJ0ceEjHQ7
iT7sgVcye17866Uz2WMIrE8koNnS8NI7lC+FvbPfGv62zHyJkakCXmQZD4JLuPh8h08gViJrOcAAjPbL

yxQCqRhGKAoaDgbi+XXQYKdYa/koOAknSYODWNNf8t8dvIf9bjiwHtP5S90L0D+cdI7ai83oWc8KcIIU

/z0dU6Tvbpvnx7xSDN2/L9fu+BRi5i2/0lu0UmumI1
lH3hPx/4zCXH5oEf3uo+Vcw205Gyd/1mP116ALoNynuNn8ED4xh+RzcyXPZvF1B1u5K/jCP7c5mm3mJy

BhPG48IKItXViYMBpVTT6zbrsEY73q9mHWSDHuzemg2hOuM1KMlhtwpfX5DW/3iSDxPy0io4KVjuYvU0

/MMMoHsss5OjutX2chsZV8UWTpTjMB2t7rS06c9D+9
s6dhnaDWgERLbO20J/fSrlZSRhp9Ij5NS0wqX8KWt6zftTKJiC8x+N87aaBwrM/Ue6Ikddzip9VmupC8

xdhhJFmNr/ANAUV3r6QFaxfVIXkoz1Kx5TMP2QiXYtxYmlUXjRTj5/vkdUkTXuRc1f2e30z8R0I66sHr

HsZ78aOhi30laO+6V2xoKBcdXgU7v0C2KpPU2IM+0L
h4S+cp9mJu+HNwTRyrtaoLE+Hd+mD+E56K9DMHf562zFZj1Lb11xAjUFN+9iRyRKFCf9XvdRAmYomepi

e6Y5rB0a5zmpNQKnR+mLHzI4KVU67IAMd1ws+oy7Q7dqJsmEPbt8uJmobon0lhqAlzYtlj/zbZt6izze

vuC/EWsgm80MAqBPz/RIePZ4cDZnx/fvHTO+quq/9f
sqTPz9sLi6u9ymVThyWa1UDF7GBfkycJ8/2TrGHLQEcrXQ2sTiz6Dokh4EkqCXPeeJe+6UrZoffLy+tj

7Uozi3hpyGLM12uzX7x/2V9GLgSE13UrDstkN4AzUAEpzfmSAamR35DlCWOHDUFBnW3T/JhXl3bL+XaX

vUyfJHz2GLgDuGrVJrlDObf5OQqZHtX2XlANXTXGLb
mWdnodydKeWKgjbWv1knqb8nT9J+WsMaD676aMSOUT3Cg6YrKszZInGow/rIwqoxPtk0Lmb4AYVFDdft

42fj0Js4UQWwgysWoRfbvibjlkkmaICKwriyLtCkttPeQf2mG9XKtx3DmMmhTRNKn+uF2gRvaLSKCfWr

YfDZRtVOervSWe8bkC+Nkisxh/UXW548DsybhS3kDH
Fjz3lXsd84T8ayMoVBi5fYHcP506W2jo0qK1I7fougKsPVSUsCohtUh7ji4B6GNI0LZRBy9FvXfR4xOp

fawB/nwyqzH7o8xqI1qwt6Cv6glY9VwzHlTVr/R9NdL6/qulJjLZ/ZC6VXL4Ze2nZGrb65+DvjBmIO1B

KhkWBr+YnEX7ECCrYoA1rnJLkgrlcLprDa7XUTkN/I
a+ZLjVScq/6Jdsa4K2DpWfFJ64sUqti/shOoHIXR6ufboQw4PeuOT/MwW8CU6m9oXR6coHexmLpitoT5

HNfbZTJoNb5Ulb4cqvh85KSNciuesNTeiVzPaU4XiK6rx9903FuBKU8x6C0HfefyIeFeWP4XVRY9lmk4

P2JuBVoyS1l1PjvEU1HfXXdgJRTHJxM0KU1AevKPrU
IplSbN9v40G8ECqT5KrbAqwXocEq49z0u6VzZr+Eib24C8PSqzsiYkVu3Nr/BjXExPnp6tfvE3J/35wp

OK1Ml1ThUiRhGgNVSdpP3NOJFaIUfHLWQJEfpCfGkcUmBsg3TDWSxTlAHALDKiVLbG4dLml5Oiylg6Hi

cv4nJ1sqvrv27naGEmpyd+v9txfuJhp3C1OYMX+NR9
+afg6o0ywE/P1YiJ/d+oEeN94s+AB0seiJ1zC2G2DTTnXHKYiiDn4pavVwNw6YvHOXS2r5yE/7QTEjmh

2cSSLfziEWD0VkmUkiTrBwbYM+h6zMXeaZ/n5EmX4ltCNiLlQ0aHgkAxAkmA/QQmkKfZ7oTxHpCHoReG

vxFfxW+J1mL4RDK3VmdmLnsFLFzvXe1rWp5idHDcog
6ExSeablOah2CyKkq+6aplRXYXJmAvkiYK7wUNHW+Q0tsm7eVziH0SrpmYzM3Ca849czgMgQjGa5EQn/

OBIxNfR7f+vT+5YSdDtmreLP6u119doqTWX1dy8tIbA262zgjU1hY9NpsTuVDxAILtbdZdEC2xD61Dcn

yBAndWniSkzoCzZOcuFKztDPb8sIvsDA100A2LRNy9
rl/J3BCzggUmabWB+s3w84tBxyoggsu8dQtXIQf5ySNLoL9M0Tf++YqM+42tsS9QPEwWYcJOsO5vggIs

4rXFFq4mtlammsgNmkPM3MhHGn96PqonyrY/ayAgGcODsG2S2ag1prvMhpSeyyxsDXwneCBKj8QdZaPD

NQ9jPFC6uOFQtv4DcPVz5HQ1zMGpQse7SPulug8gfT
2NnL3E346v26wCecuRkCzESGf7TGs1WSBHyzPDnm4MYpIArf+m05W48JXDtzy0GyA7x0yeHwlKKi4GGP

cLiJXr6/coUhiJ6qE5wVUbwIYNCbVyRAR9PcQo97n4iY62qhbLNJG7aBJN4juZEbicahRTUlBqpBnRsE

r50EptPyPnr9U03pTeZREnZqhXIjzo/Zv6DfMdbw0Q
8j3i6EkylGR+b6QbsElGJgqVaqDmdP7Ucct5CLBX3BvR5V8VjWWugQyQqx7ys6vBz9vagTtq0Oq4l9SI

o678lTXT4loZ3t6JNB38mNOX+wXB7DE85cEiy5ncF7L60zQI6rTD8J7+5aJNmDLy9M92PS2Rn6+MnhgB

pbYdg0qFrqFDqM1Trmn48YIkmVcIkPToK0lOUPdWaq
QLt2tSNo1pN1pQgftiSxZwNettvOlh+ECYhVxXxjL9x/koHnzut70t1Tt0NLWJfSMaToJjLfqWjdRlzW

Ec1g+OJv0Vrg3fsgqmwPMxb0KRk0fRGXaBGxZEYaBEU9d7TYti78E1cO77CXZh5PUFpJS+OxUvu59X6D

K5MImGt6p16knrtvsNMD4k9z4ja+uPUo+6hcLxbUqh
oMq3hbyIY7oqD6bO7i+Ap7xiSlpQlcbj+lzJg3Lbxe3SgX7NIzf4X+y4SGO4C4J+aO8XX/p+mlJJS2AS

5eb4tKLx21hprUAFPTtNi+DFAEJ19wa3CgOjNWWa385skIfFa+z2MpiQI0nKJP0yEgcnYlVyqw87sCC1

8MfxtLAaoLnxK0U2m9JmK9dU5OJLd7ununGOXB0iLe
8SAlaoTSPoLqRBAaBTUm+zWkPhN7SC/Wana0WivL+kkJ5rvwrdAkcsHXKWvK8WXvn1OW7CA3HbpqK8BP

Ozc0GJRfOWprcK3f7O6uMaUCy38L/pIgfn+zn0AKicGqdgIGJRJtb7aiMHpFArQNv+3w6/9vp/sfdVQW

2RS8ipOSxZ6Hlt7MDiaBauLlQENS3nJnLFxvO8MZFj
6yXckRGrGjuX8B5SCwzbh3/sbN5rsk5bbw5d0+iQQU495ieLa+b3/oBO1KSSm17E/8094P/DAZ5x7XyE

kUqgi6DoYeiQH2x2iimmN6xA4zadpqvKR86QGlIbwvhVu284Y6frpnImIZI/T8iKASPC+IRl9Qn8sVAX

AbcuLVwCHZJ8ydPoWF7yp1Gi8m/fnVIVXAY2f0+v52
VQCotuNcWtOaTunG3MvdWmJy2Aq13Cgpq7V2i3yU1VBLvsUXVuYHTxd8xpjISyE+KzuzB2zYez1LMxsh

7g8kl63xXKBx2omGmdew2ROHPtrm/lJSJWK1iGojsv9+p0MI+O25dPsnnVlr1hl1M2rjXHbRz55V3yLq

ICvqBCR78aNk1l0IDlzd7Hmkt56AfHSdXQobwtBzPO
VIMzgjzXGzjXjQiy++x91xTSfAfSks63cyRapdhT5M+TS5bMC7N4cD1gixk3kHCFs8sdR+l3kj1gfrsn

J60QiFLbH2+WfgQvxLsRJSNYWqE6vWpVx0InlLzI2LYeIXjFOw26DmZitqz+a8wUjuX8VtoIhVtBVPsQ

Bn1OC+jv46CVVEnmvxa50yUBdMFqIz8GLUH3vDUcco
SLRPgeePdCNiWkBPjKqBKhBTCRJE4pBUF57buVQHL3FiTG4/oMZOO3+p7Hbyo2IAlVtUWkYxDLMndULw

M+4i6os242muUUPrgR8B+wPGwYjaXKtDYUn4l/tQMSwPeYsytjuYMEmAwtZ2Tz9eU59Talo7tc0EDcEK

gTUDc90FXr1MgUcWAaAWdsd2p94MOFOBIxcmv7dA0Y
b+UY9c9EDQX/ZaHTly2EL14BxRC1cQ25OLZma+X4C+M93KkvyvZ2YA+rjNUzWpezYZOy2rqFr+/+yzHI

7akVQxsoeqmmWsQ1ZjkZ8+tCS3RFSN/OVij5W8qHXDcuk1Pupj8vPcHWVRS3hL8zKu9UbP7gJ+fcQwG0

mPA2jMgFH4eV6ujc+vyeO7La77e6VZhzaXfuFeCxcZ
8YUzXUfNWA8mr9MUqP8SkJ4nbLrB853Fy8gVTFXLV/nS9pd/6/Xe1awUEc8ha4C0puwVAWEi7Dl+BEhY

XkmgOEOdjsjC8vpB/7Uk4COErqyKBls50d6eYrnwgqw+/9dKo1ZZl2dIUUl39l6wb0eK4ef8XUi/B/V+

5RQGVU5DD1pkgTZa4w2TMmz5zMo19bRNGHjtq9gxgi
jpYGQSJIjfoz/KB4iTY17XTdeSC8Q7LJeec74FYmRcLB9BS9Ya8m9zavkDAmIZ9FgirF0KvvTrhMt/QB

FGTNwA1Yjlnc3S2Bpr76hEhLrPGsbcA6xWMF+Bv3z4yMF965pyN/h/2kJw3YMViqHS5K2tipMoqCp+x7

5Mau9pjTchQ+DKmJcIGMkFY70z5af5bHR4yw+iYujF
+HbxKZK7oHFMvCniD6Pxg3D+jAPRtEDs2L1m5kQjZJZa8dMBkoZzVfo/lI0jUXr2r+n6F2VFXTfegE5x

HIN9CAJddrWYYjgYvEO3dpAYSr5IYXyk6K0+UjOc+XnwptQz2XTNhsy0DUnJRv50bhOiDpneA92vnc25

G15gVKm9CTY1Ea8vHFe3v48tic6/E6alP9cDwQ/W7w
F8j+2OYOM6EZw8LiMIaN5OmBunzZs2PO6aLx1v+8rohRUWQABqPWjRsbpzZo94G2Y5KwriLm+yF6Pwjl

2rLtVVJcB1m7L8KmFfQBdg8vXghiH/WQo0hU4wHVtHO1lUOLDaqu3kPbIfWtRJnCDk6TAuzENlFd/d/H

FTHkLoWWZJ0kq2xRbFyZ+yRp+FVNlJKtpitcCmpStp
h+eHg917ssIFP+XlFJke8nGhVN5cy9es4QlZ9bJiV6Byxr40AQaki64RviHaK6hF8TMUfRByTKbQGqoa

FH/HR+3ha/LycYnoXjOSy9C44pS2LaLL9t+PdqPQDICGOvHgA/CtPtRsEuD9lTBKSyn9hppRcPgt/uI/

iL36c2ho/F5oPvmfmZxqVdDVP9dJEtz6gNkNohApMz
CnYfxbWFCDPvg3I/q1HHVnFnsEX917dwd0gIHD35s3ACA1v4PAVYo5vmE0M984Jv3uCHKBnnI/Riz2V6

ddoXTUPCuB1yWi676qaSmkTHG+zkFl3QGEe8NbKWcrcT04d5ibh87129yAHSWW1QL09bCglpeaaDdKWV

7DyyrICodB46YUOaahw+j4cOVnIY0uqAH40Id2sQro
1QOhRw5THOTzvkLg5Q1+FD9A+2aUwBo9b0WyFL1FufSg43/MeYD8YnWuwHzVLHfRCQWtaenfzUXTjtUt

NNtmXvf+wgC9Ndw7oQJhqGPaM3L75y0rT0zGtxHXVkOMUZpelDQFn/Xwo/fk2Izh/4pT0txezDhR5RUu

95Z54tGU9dlgQkZuX6Ze4cw1JopNmBQ8synXwG9KbI
84WirNgk6x1yRCuZLj+bNhcbZzQqdg4OVA+ZL9g7/G36M+RAlP9ZAmZCPmb0CLVg9m6cLSCz9ly0tq7r

EX3+NdYlS/UGjbm5bdLRyuuZgFaH8PWMJOI71/2tl5PQQ2tddMSknOyAcp3vetm08qdDV0ZDfBLW2eiE

yRAHtqobKlC1Z24O8ThRsIDJ2mPf9FpZ3u591bEPph
KtkLC93d9rwTQ2lnimV7OsWVyO6gKEFC9HaIlktVsU9Rx8XkBHY766XdUQxV456q/nscitZ0+ay0lR+3

vc2/TjFKbjl06Q0Sj+c1/x5iX2jc/2BC8PajMDALWrkoF/PQlwnid43frFsYtzHBqClTSqRikhGmRykD

z7ZDw5N+9Ykd9eGY/Gl4odF1UC56SWoZlZMsHxDYmn
a9BmidwjzIomeKbAciJlvG4C5x4E10xVSbLu4JDXpkbpgJGMil/ZQT+w16iVS93ZNL4FvZkOD7+bFX7V

CEN5oyiByI5uQSeX+iTBd4ZtGdXhvX4DILBIHG6rPUKMIaiecb5SaiLlBrOOayU5lgg8POKFIuBJFqou

WPRKzsFubzWwWBINFKpB4I553hycBiZbK773YjnHaj
ejHah2mehK34KUU034nVurKAzbuDNregolTkRpFt1b1phH4jLTnsM5024MqtOX5kK0m/0bVYwyOuaYYH

z2pQEWuDEcFFeuCgMsKUCknhgKjKymyqGK412doIjl6jytU3zMxU75cwPaumQrP+GC+mcjlu+6Hbvj2s

9Suvide+Hqni7og0itNE3agKAg50zHfimqccRUBoZV
Oj4Zb89/iqFDF3dgmd5eBI/8A8jNbqGRR4QhhemxuNjco1RietX1J04NekZ9/sRCuYcorTFw5Hc/NwYv

wX5RBIdGJa7vHtQPEXoSzq3yPt2moZldp+8YkLkSL8+BE3mLvISByqCYc4Y3N3GranhIcHkGaMagaFn5

vDb/LqBEBozwwjJ//eZxwcObglVOzcXcx2oP+L5Nst
EqE3OMrdWUCftcDXF/u34rKFUsppYllGW9Q69761RpOJ2wTELTx48oEZ8dgXhnGrQgry4XCs+GbmKk94

YiS/EokR5N+LE7KiSpMmX33mwiVvff6zs6mQ8vnKQbJYMf54Mi/SKZaEbGA+HT0ua4eKbmgjNVuyvlj+

Ynu8bv91sSjcM+moedqf2ETZXcPZVXvQpqzOyTYBSL
WdUICMmHoK0JlaDRhz8jJbt0lWlF+9wD6cst/WL1eBMEe2XBsfbYH12yoKKNiSFnH3dI58NcWw/YAgR0

bCjYtOWpwV5fxxfkjG2GQ73M6zf8juGLHtkUhsarLNBGDaBHmmDxpJMWBHr9aWPRJDBsxKuOhC+H9MyV

x/tk1c5GuGXKCfN6s4ElQs4Frd7s5/3b1zCq/YmAj3
4iqKO8sjd6AAWuOZZu0i4eJUps2/gjATTh4vAJzSw2zb/esgzJQvV8WKfs/zK8US2q8qAx9j7X0Kj2Dr

eDsxQ+SbxGZRCR6Xq7sSqQCg9rZdBeQffhhfJBQlKu+GUJ0Emd9sot+di/U2xzHOTkvCIRoMzEOT2LN6

1FiL7UQ5tW2CgkTsvqYI8GehwSSv4ocE4gJ563/AGA
81twde3Vx4sedN2YlWfRjBK35a7+9fTNrNyYPy2gciWd8bv7/LpjJt9nyV2h5ErnT42Mx/5lluD5nSDG

0UeBow+pF2FaRzxl68Ukgawgeo1u719tbvLWSKvyzb3kFIAvliOgd65smtReuPNPJDkdw4tQ0TNQffd/

gwy9LteBRjiFBr6j0TiLiQWwuc3W/AICIkll1f5v6A
HRFHo8kNP9LhGwI7ic/ZIxVbfc/EDNmxdQaML2zY+aiLOeaSxgR+b5mZAUjumzHcyfVnnrEAaPg1T4aJ

/9PjGVfhG+6yWHSkYHLT2z9B9PpqF+0Bel7Nkfhk8Q9UsD+5vk65ikZjdvvwWgDx0O1/Ua/ek8GV687n

nwUd6wZGkgNUP7Tzx/yCNl/4Nu2JkSBe/YsyOnNX4W
B6RrBdaLDkzAHqCRx7IioFyQzqQCsyb6D30nFYpxeyIIYNBmVTWdquZ13u2E7KG/fynfnSa6KIGWrQPf

9dt0Zwy4XvU41JB6ToPT8nvIciq9aWqeaT9izGzGhlVIyETYZRhV0mU6SGaJOkksy006nQDw1hyif47l

QMUJFG2egsbMKkY8alhl2x8xVQmKgDu+o/qfXDj2CK
kqIfUT9ZPsjh9+dhKa4a21ke3Hu8swtLb9VGXGn+9ELB1+u4USp2Q5as/P0xqgK5gVRMhAn4QWUr8L4j

csk1vKgexK3SKmw14nBdbVmAiq3NmJkOyGdX243yAzAU87AqxqVPEjhkmBeY+FQdZRA6w1Xswct3+Pcc

viNKXzik1+wYPcbEncI70eNic2wZjUzZizJY2Huj54
jkLn7LHsjS43LvIpXs4OyOMRdmGguEna674cwgzAzHu3VvZrDy3ABMdJ+jEW4ELcaY9betgIrWlp0FU8

2HxSohWltd+jhq82nwii2wZHKdEmpC/129K3NLK1i9y3PKJD2ZCmndX8xkHGBKnFkytIh8ccKe0H2SE+

4bvCbW7PQpj269W8B7JDT+6lmnKJrG3jGRjIYnmfNp
OCufVNywe/L90bgIJeDT/CI43JLO57Sg+ED7Q42gJ+9M3Wa4v6D3BfGFkSTFveHz87lhqBL9iM5NErP2

GrsNA1ns/xDajla29gxm8xZAvwcjFJRMbOacJ6aJWjdGh9twW7uZkp16eozkcxPw6Oi/DSTRX//m6fHr

xh7feVQUGnQNlWCGMD+v0MAcwUhv9VL/DH1n07iAoY
BpTmj7pT4rx19Qx5ypY4PpMuOR6ZE39ovl9SuqL4tDXnUYIGIhWEaQnXJdbpT4j3cu02Q2ARwLUznNGA

cErGTBf+IjzAbtZLDc+N4TpBseYb85a3IWCNGfn2enXqCj1vSgAWKu1O36D4/ij3h8NL2MXuQzWBpWD/

dKTNm7QtAZe5XaBvTWoGV4QQ3qM8mcTzj+ffO42EWl
eksPk91BMJpDmBU9kReKz5lrCkRIal6nsjSfk8orN560SuLXdb33z4bJeAgNTejLR5wPnp5oXdA3e5aU

sf2a8/qvygQwivtTt/drX07p07X/54wXb6MO55OWC/MD8g3bY9jnVjYRuAH4/QGCUiSUoVLQIySA+1wW

iWDhi8riiMyh1yyNIvI/5LZ9+U83AKl3zHff/KkMU4
0JBNIkd6hgm1yvxwczX72SXX2hKNwHwLYxVJiHgwF1BbHOKFhId1mttzA4aqosdv0GAqEa/Z9nCnZnhO

PqmwQa2Ur96LiZKEhqlPzDt8OdYqSj1SB0iEkbK9P8xhj06pk9I5ONNkTkv+/zmaavj8zxWMRsGgZkUg

rYq/PNm5alwlWvgN8Tdmn90zgBwyWL2ssh33HGX1+/
2SQgn0R/XMs0qUkZI4aqtuasZANvzTiHWdlno7yhYn2/jL3MzPlM7aUeZtIjVlG8CbNpyouBznh1j+GA

EbA8u8O1973rIFfso27FTVCZnA+oEajUPpTlkxIG/dDqE9DuxlCMqRiM+DdjJ/hgmUmYVejGO94wZsHA

ugEkpEYcSA//VZqNHUrXsZa/qHaLZGfmwBas0Dth8N
oeE9Xz0apoE3hpSGIZTwoBUjj5p9Zzp/64DlyqBCecQQZ5SQDiFt9A3ENtQZxdpiq9lIVri1N7v6fEfE

EvbvAAsm2na0vQkERLzipZNYDo85QXyHv6Jj9p3+vdXLO5b17KPASlcZexgZDvSmNDNz8+Kp59sK4204

cEL4O/ENrS/fval+BLX4Buc1kYW8+Mhc0n7EEpmhMH
THcTvjznkOsepeoh1B6YvRk+mbHJKOkvVBHEY1kAVrfvH8dB/4gHnewlWkIJkj9ck30MIn+NsPc+cey+

wN1aJumHTvpTRE6NJBwM5VUNf1dFQNJqHlIx/vEV5TehnlThLk3wLfY6m7RwN9/Nx7nrrAFVfRDZGA2V

ESzB+H3hHzVLNX0qfxqO+UAgoi6ePsrjFXU1qv9FMw
iHBXEHvPHnoWKlxIsboD3JsuybCS6w6TUbwQRtlMKxHkRvmjXWShq8coZJO793fd14JishbkAAUUcht8

JaErH3ZTR8Jpjj+PHHGr1ZjWdog2HXwyHwHsu+6D4seXY31hZkx1vsc7ZgUC1vkJXxV6cwSMoU9pNbTs

wK0GxSyp/Hf4DS7Hr79W6HH0xdQqpIvncbihw3/2AK
vjbTaw2oO5DYVGHt0AQ5Tg0C5M5O5l2d8B5wL96GhECfjIiEmo3/cPsDvVdFQWByGF42w2Hq1jybMpV8

mSna8z4aUIVHo4oy9jAhSvGap+81BBkhDR3EMPyhY90rOYPkDs8y+8ZXk6UUk58E6zBZjoU5xjAvcx81

CDdQm8cJL57jtoPk2q0F9NwNuYDHpHA4dki/aeVIDO
z7J/uY+P3TUXPJkZCfsnCHF9ODyWeleJy4YJlJCX81N9b53wY/jktkgq837Wp6IEzRbJHtmvS6IsVe+M

NUa5z9j4+9dypDh09pbfr+L1sPORZP/BPG8JUl0r2vfqis9U7s6odluJBtgTAfSlayFtmndl6iV+bRdb

cnPP3rsNQn5fdhYvok0Z0q0RhHTeubK+jaDOyqDLZc
0MTuhA1c0TRdDV1mSzpFEVcRuS/wDI+efawkzvZtupM9pXn6R9jZMJnnSG33gtvdU7gOeT3dfLF3m5My

DB+7AAtfDqZVDNgeoc3QdrT7M9iRi5j2sBdNsfxji2Tyqczg/lbyuJy3cxCXQii7cVHB+wHkGgTownTy

/AtiYbPN9p3gnB94upDtJKRumualLzdrtUBM15s+3R
rHMQ9AqWpNF9oqtMHzD2rPuqk+chLS5lYtH7PPcc+jYZvR1mrl0QHQg/Wvs6NxlyEkIHTFAYsPnJXkL+

GW6w9KOTvb1pETV7PmAvlAIw605hN3NuUl7d7Ld3kcbTEvkEpkn1h2ZNGW9YDk1WxEap/7Jl+TV/ApRr

ux6nFfeNDQ92fbxUlQ6m+Rn8Y2Qf7tfg8BoyHmD0bu
jrCz3QaE/ARDPbplAm76us5zCG35Kx2WClvYptbG1ZxPy3Ek+bbOzIAD6O6fNdNF9ZMdzDsSq+Ci2NC2

8pyi+owZPUp1VeODjpxFFyp6lLfXEXuBpr+ccqEfDb4FsJ7/zt0Y9wco/O+6TViLHcdTv/yY4MX4Kw7U

OA+rIHYzeUlk9eQ7VwqtoioXs6kToXPOCX+WqaIPw5
Hepn4Ho3MTS5zF4F+wPncoJfNbThqllJ5QB2TvVTvpRZmuSG0RuAPEuyxXRN2OGoSW4iysCA/YtxBpU5

DnfyzFU1/MW9+7rb3z6nY7f3+XKh6tly3avTrMB3ThRYtHAJ1Z7F6RsdyO6z9WiAHPL9Zgc5z+LxmFth

uDH+Ygg/wNE5w345zkHaf8VqYMFDQXmgKrPxMQ1ZAa
mByoqV0bGhT/p6gi+999OgW3Xjidg6yuRdekb74TAFOLydy9jN/5PdH7buUBvv0/n1Et7HS63+mNVyBk

J0xecxyAQuI4g2HRf1kZZuUVMs3AZYcCigxYO1uqbIsUewTQ/SS1n/zVvxh/nD+etfvIVXzLrjY7I8Fb

RkYor7oDU5v4vKJjj02o5DdibvIow/w9DN3vZXCsjK
WPWA8Yw1qj0sdkPV7N7foWxV+vj0gRlujPvcdUyZIZhRRAFy/I9hOhP270DBurx7gSk3dxF/Ns+p9mc0

WuXpJZoYWu4guMioojQxJjp5T8OuOFFL2HhIl/lZOVhTsWu1nkhO8SQcn6qQql26faGhjKcvT0Dihh5v

qsNhoFMqSJqSugZDIuK0X+LB5Aujr9+N6Botfnnd4F
CysvCdahvdf97uwokjGuZoVw1ixt0xNgdi08PQ3k+shk84vp/QrsLxaG+3RB1NyTYfzHfQ6JGllbJ8NP

Hgz9ysCAo1r7geyl2GIDJpNLloLlbTLi2Um7OJh1Qroeu5gzX47KzvzDa/DKx3kFhpTdJ5/kkoVkW5Fq

hWWq+7ltU3fXjI5oB/TnA6mzi1k0jd/rCrnkhjY3c/
q1PV1aU21KWkPSs1dOgylqN6LnL5J1fugWuvt3heXhnu8nKJncjYB5v9Rp2/wld4BbkwOmHNGy/vBT/G

9ImWDdlvrcUo579UZH1QISxbTUn1arforK9ptw/bxypQ4/ukBSIk9wPaqZVi4Y2gtc9hU/MToZ+pSVkK

qt5Om69lCOXWIaIjdXuvq5//EQ9efKjb8HsAJ+804i
g70csjGLYJzjWfwBOdOkFmg2PbJVWblae7RYKSHd4r5TCnHRQ56tgb5IHMC99ZmW/k8n4H9bJwwZaWM5

ZI/Z1lpCdb5dCbjAap9Db6ci5gqXwS0668B8f7R3v2wAHIUbyW5FD87dM28ngmykACQxn0Hdlem9qHKu

/[id number]cKp4sZZeuDTB
4trTq5YPAK/nk2M7q6ethxxDq0pqkPENDZqvlyegk3SDqeywRHmLmlobhO7GjWZgN1aBI4xyXLENFnqs

1bfMZpQgKkTFfcnvgQuJfvhq7yUKNroK+Qh5KbEsPWn5ld9zQuBhrxHA0jyHEBcjB7eEu15rt3NZS9a2

07+8+9d4mrkS/s3W7cxOYFuWfqZbbyRwYPyAnP27WQ
+oJtCY5UpZY75bcFmu225p8TAiYg759HNmSLL3a4YSOzXEHY8ZRarheJorCWkLo1jGIXYYaKfif/BmlF

YbH3k8i6Z+VRga6LmBwklBEwwvKBjT+fuhYxX6YLmz404xIUucV4KWv5GTCfS0zIxa7UWCZd0vB9afL0

23AKrSKg3FgSfXFgAkWDQYxqhnKTDA6WnO3Ki0Eod4
8S99x5L8DqfKR9a+etz/G3+Pe0yZJXH1OKAYJdFOysFouUu5DrfYuXH7SmT726GcSmH0ngTD6Sw1rKn2

3L+fw4QyyzRlmhgERi7w4MxDoUvTIqX9X5gl9nM1XH4wBbtZchpdpHVvkfWzksA/gz8uu9SnCntU4Flz

2+NJS1lJTUgMY7fAkMZRShHrFg33FQnH0xSI7T23gx
fZ7mSCiDHIWDtA9GWNptCoDD4z0Vp5Y6aYSlgfgDL25vzU9+m1NY75kTnNIC2uRJstmz9EorKLVVYc8Q

3zfdS5sQCu5Tm6izSLrfdlLBMQqJrVV+9dsZnucShLoLYR4ZGsc3qf7HxQTzoFeUUojXYNgKuMZM/Bbk

mC9wKmu+qnRV+TYBTwEI2JR9Twku11WvhteseY5PHo
+6qm3YR+DxnS4exfVuGtE23/3DrOr9XY8d33BVeQ562lkFzW584XcolW+KzEW00vcP16dlt8c2bu+HN3

SADEaqlGPNZ5lpYRh/vwLiBANkAJ+mIvzfozufAMOLRo8I0rAOEGgB+0tXxtO0xOhgT2HMg9aO0OAgpu

S40KN8OgZbcXmy6tLKhVGbN3XyeWwY8vHUpTZovPEO
PhNtPj/vOZn6W71LIr6u0ej1+EGYzbQuukSVMOWlU+BuTS/xJ6CpFAcN6cHuBw1HeC8932VXkx6RAk8l

HWDHzaD5Grjz8nXPO7mIJ56hOWhiWMy8n1wMgPkNEJ+Az3kR3FQn+AOwcb3gaB4Cl2JkNTeqiSlnqPhv

iXNvtYvJkb4zxOvpN24TZSdEI++Z6bi6LKjiB8nrda
2LHf2R82fBlXpRNMOM76z/N9xxLBp201cksumb62mUhNL27TzcrsjGbjh0gJTELJforlLooeLEV6o5Oo

9LWNDoW0U6P9ReSS8lEhFJj60/YZb5Zz0DG5s4TNUu8/f5d208i60zVzzPQu5SUrzcjWBrgwKiCvQC2j

TEhbH7RdQ/CIMzpspgnHuwTqfcC6RmKL8+PlG1iUis
3zjnj9JUA9JfwrapFgHL6SlNs2rHCUOecBs2V29VdcF6JkvSAXzvdhE8+0bMZCuwtL6nMg6rzf5jt3/a

ESDNVTu7YMW2lVLvCA+duSJ7RM73JTrJY8x2iM3fOhEfMgNpG8KYJB8q99zLksqEl9++iNLRugTKQopg

ct1nFsZL5QCIatxBdzXHGr34YtTuon+vsd2QCjfa+q
KH9zexQP9T8kyBp8PcormJ8llG3F4nEMuZyrexvtveb0EiTg06PLfecyHMAx5N3TBix5R3habePvQx9y

EJ57XNZvrybnmCvUCjo6/BVT91Hx/NGo2Nty1tiHVdyLmiGBf9MX95nLdXbE5fx/7DUPle7kKFXZoytp

XFXfi79/AMjV/KjDx9KQaxTb9Gxly3jkZdpF+bpkOu
uybzD/aMQh1iYaSrj2NehmoXFPW0YDe6feHkmN8BmOJpv5STQA8gPLmzSEuuEhI34Y1AcLTrx9fquycj

6kNeUAZ5SrFkqtWWSYXwG+BOLuFxX8L6TV3pHEWTrdQf6juN6CLxSUI44WdQuaT6wv9k9S6q5BqU3TeX

LSmxVH2MuR7oVR28cI6Tkn2LZOPjAjnxMCNkMcIyRZ
vQAiOhbFIWc0+BrnouLUA/Xv50P6SnWQ3cgW6sGgHxF7UTnPbv2rrIiwu5NfhkKVxjqphNdm/R6TEsLp

pv5u0qQj4aLLk+wbnvE/1LXDKa8O01XNHQZs7uhrOkxJs8PL9DcTKUfqzex/9XMaTW5+77DjGY6ILtny

q7jRPoP9tQLhDcMz56IWJ0jud2ARxSauL10OT0O4GI
XvfVhlJyZvnQKTWqmXHr18NcfxcDVkZ3MoCe8sGgQEl/LAkDsmMmyWXoQMFJSdAG2acIKG9t7IEWddow

utJlTi246lPJZa3Lvj8/OOk7hbz85kJl8A8qQYlDdPOgG4J1Gy6A6GEwuVzn1SfhrsCmiSw5jDLFeQno

ZAA+Rt516J7xacosjuWvM4qwjTEgfsAybz5lHGWAnB
zt5tPMZ3dM1hJYKpRV3yK9Q2wPoS8tE2fe3mwzmWaT+pXXEBZXV3r2LKiRAM0w7t1DYNMfYGAQUiPct0

uhM6yBUV/g/sID29GEc4a1wXrfWunrs0+HiKGS02oXDJNiSeCy45Sqz45il/QiPke/pNP6PyKyWH4yAg

jsVH/raq5Mj6LxrevEvc96I2BwLqxo5ndCBjfsDSOM
ayVEx6PW8JSTQACa7U1ghb00bvHdBWiiauKFHqi9vfOUFDLcFOWjwIiFIfiPNOZPdki7IQZF7CfgfhvJ

Qi4DXwv8e71cx/DAKnA4eJvC0cLi7J/Tyql1E3YPtZtOOlztSb/0zHZF1CmP2G6rlwtlwElZNWYDv1Fe

VI9j89wxd/6C8Zyd/pnkLlhO1Q5jw/6sheFskcRBrJ
/SypSYs7GGTuDewbBwjwW1kSgCrKUz2ll32lw3GO2MrU8N5xBzcEWl5DMsjdzD3/kWzwIm6dCMefTO11

GfZa6Gg60j9xjhBzikq6rQ/hXYsbH9QJTz5jQ61OJ72Mxv5TFZImIPF7ozKMq5ZqKOr1prn0GXRLTJGg

3KSpGUBz94pH9kPyYKs13GCsTo45yPKE0D4ki1zJgN
jhLaTP/F2L6QU4ncMO5mQRDvfzZ+ClQMrA+NAoeBcVO2dzkW/uFNPqZUZXcsPSNH//58KjY/Rx2O2/h2

n/4yRdHMxlAya8YfxXcV3oUnOEyxQ4YBEVTH2DXccTBOu3AFU2AxLgnGHQWMqpyGekbDqVreCP8gpcwK

0C4SInpArmlUzWu0RU7qqVyHlZZU1BNAKkV+RwnVIh
H2nid29Q3nXKhNfx03UGSVAHq6qeSNR0EYlUJVgBCTy97iOOvPLHEl0pK/pPx4WHNmW/3h2uL3Z2cTN2

2EhcpJ7TL8NHH8nXRfjW5h3noi9wYPV6tQxQGQ6S4HtKXueOAJvNf7RS/piWPxGs9yMH7EqcaqlP05ev

9qelKdKjluCPOs/BSqQ96PSrX+o4nAWfqmRNEjsfy2
c3GL8Ei/ECtaqFLptY6sQahmAJvt6VA3WpbB5ZDyKGY6Y6EOJuEdz7n9WwvURGtwuKy36BGXXJriSmC8

pci+mHA6jDJt22b9i+4oI1uGUsXMrNJnmWeLJcsr20wz1vvoZFVK0Sf2jDclpxD2T6uAmGL6IgqgO3sT

Do+4YlHzOVc6awREx5VWEzdCUWmG8lbyXN+sE4t3lz
o3m1xIs0kJTGCD2DINyCt9tcDApbpD2nrql0Q4oXXml5jyI1yoKP3xatU7ddn7oWkrqjZewlVOqHimML

m6kDePRHzfk+VlIM09RT/idcnp7s8rTtMxTYvIymEdUjCPz8dI1VjwTArsG+8+Sc4VtRcDdxgIdhMB3l

obrHjvNUQ+IUwTVIPNJRifr7rllqfmLRr19YNpPJHA
po822J1iIuT6kHxYm0e4eRlHPcYQsTyTqsk+xdQ7jtGFBfO1QsDRO3zq6pP7R2Op6rh1qTdKmBlfnYqF

6xV0maycGy1FSvaFz3UKvsBWA9p8KqWbUQNrTgwkehYT7vdZDYyZEvrie8KyNCR1PqXw8cODN2C2RjA3

P6kkfcjfEnSSgZNkVlA/r7hmWcoYt5F8a2ZNAZ+JQR
t7zkK1G2WadZUjPoppy9XZDWrurVxoF+FHHVRoN0Cj41Ctb5WxN5vXlw8iZ5s66p145nP/SE2Gxn6v12

Ah8f6kMX5VdUITX2EXb4mo0Ito5jIc2nFoH3R+0DxsWF/rbnhoTPNnr2Lz+awor8u6uOkDos7Xaz1x/X

hh4e6AksO1kx1dB/5duCxN/JrCilGw8yrZ/h/Tcstv
YYlDh/Ww85yyW5uJc1eREjAaRSeXiexZ1z5yMVITnR2HEupQIcz42/Ei7HJ229qT06htGKN/DoKdOU31

a/YnnPdOmmg68Dajnm3NdrOzOluZLwNmu1/T5Bc0HJ2jdbda1HmEKnd1he+yFdlsHSBQ9TBzXnWSAG6D

kjWkUtTvpDRRiCXHL7dWS09jyajx01a/SJZYKBAnqv
z7XIoWZ2cYguQ7UWenEcBVew1NrjLyGrwiyhts+wZp9vbLHKX3HKiHJaxUQ7uv3Zipi1KAAXq6xWFNzt

j1NaSrlxlVqwOBFniS+oZ1taQ9reDY6XXV3G+eZxE1Oe/7H/2H/sP/Yf+4/1C4iwbxy/rNSiJZMlgWFv

7vQ1dgaU5g+ZAxyKTNvC3KNUQeW3A5SU371GJ+qq5R
4YILP2qjy8Y8EDO7/cVzl0BrrcnqthqI8Tj5Pofj6Ib56voUIaEUBdQOuD5m+Jf17hX5s7xxgppod/XA

MQJo3efoP//mLDPjCUx3lIb4b4D5tlFw2S1O+dPxpTjXThnwjhunnb9Dp6aaCw0xadOy/KR4V5wuiGsj

KvBhyqdMMLtrvZDHVxhADsEnpHxapdjdz9ynavlJOs
gsNfsTopzipXYMzRjKQAZwBuunvRztevvDQBwIhA3zT6NNGMmDY8uyvIG6hHMF10c6DbL8ceUOm3c66D

j4L1rZui81b2Qc57LMlokN84hQGAr++BCG7JwyWYeM2M/FwwwuiQDz6E9blH1cZaWewfDhjQY8XHkAXf

Uc7rxGI75S39FQoKbY7mr5SYn7BK+4DXGsOQ3k1UeB
UaKE+8SR+KRn7SevQ698otGgoQCVZCqOjOd0yyY5GbzPoRktxSSKPMTUs163gv6kpA94Wd1hagFsiVdJ

YedPxmMtiHamQuGnBC7MUumGmSQQPHxM2Lek0ng1z/x7h9PAb6dVnVhSNEQSayy/9QqgP+TkVsLC9ctH

USviwPWvIRXwWHdbyk/qZbvZsUpV/UoNjVygjT5Cva
2ieYOo4Ni7p3Vr5dPMMLWHuzWDV373Kh9WVq1xfircwkKwmbOHWuwijVKHFhFzukMLKrsG+ekRyjJYzx

mfzSl0tcR4DUzuq2MjIb3vWzrmqe0DJlaL1RLLSdf2O2noRoDnT8mdkaeFafhDbMLyI/5ja+Nn/LRiMS

ueZHvw+MrXJdK+eexVRGN1Xvp+/Dv1NsYkgv9iJscN
9QeYnsdnsXwsH353MAnFu4DHWmRE6aSgHYiMhs2+Gd6n0gg/PZBUtIuCpQSRspc3akcsU1gWecrW3AHn

l1bxslfLxV9Cx7J7PHBOqNLmqK5mBtKUQ8HOnhY+ZZSOgCpevxkvCYQ7AMrBJW5YK74O5XZ0RnrIPWto

PMdoKgwtNTdSLDy8x9jHGcsyGPIY2zlfh/dJW6nW9q
y0IorZlB63AB0+8PnpOdjKCle5puY7ef7/eHEKOleXb0RSmJlQc04npzN7zKYfQKHojpcT8VOaQwaqEi

SkIpp5cj5bx2iArXlSst5/cr5OS4HH0T5Hw03pQ5Tq5LVEiYkXKKB2FHiT2HoYl/kmpIQRXK7sxHBQCh

5ZKKswrrn53wHl2f+dWxFxbp5ZkUJrrNQq63mTNR71
kIQiPtJXbxkR55tsG457jk09mJ33MShS+PW19h97DPO9qXiAXICZMah+zw0PFUmha7iLBIDTob2t0JHx

qR60FARkHOKtk7a0d1qiijbFr8NFCTs7kZv0acTKWZzUpQjgD79/b1ZO2A4ojGTKQgRoNQbyipCQHQWy

KNqTI2zEaAisjDiOoNBFORor5WLrzWV406DN0ONk7S
aNqAraq3ztzRWCXWy/pNR6b7e2RoJGq2ntZqmd6xFdEnOS9BqRsMqc0xepEFvbkYADOmZoVPEVKX8ysm

+oE4WRHBqTdDjr4fSLoMdy8KLyGtTWYokKWvo7G4NjA4TD86/zZ9VQAAkcyyNgktX7RxYUjIpW2xTP+O

mTx/R35KtpUrI7nZi5gcbAnmy3Bkjyw4dtO2i+OvBH
Qv4cZlOI4NDwiEKVLcLdQVZPo4f/wx+bcqdTMdbHt+dILlF+gbUuwxN5XJ9cWQTpK1kL1V9/ySfXoToC

wsxxye/MCFTj6WHYokUQn0FY+BoKw0knkBhpdFVPMd+Ka3yp9tNK3abx0DXj32ki4F+J5MI9H/peF4aE

N42ZcxAJhYqOJySq2X/fqQqbbg9OuWvheyVHzIOIcW
8kOZPvCxRxj1Eyej2SABwLuUMb2C8X76f9Th+K7cBFm3L3EJWhOPGnnK2QkdgOAQNkd1gSLdPKdNOABA

0bdZUutakuf8J3ycdPRQhcgbVAL5tnPVoMLUL0I5nAwJxOiVsT3VJafrSlPLhIw/UZXRn8brSg2Esmgn

m8RvZXHvc2oznp3JRKb9g9U/IJ8WXLwqIeKIrFvhGh
SmaL9ChbC2az6Ziy6OroTRGGWVap4VaZvHR/bX3C+84icVjJt9qjiOKw3zM9R+IV1jrUuizE/cZH4sFS

j1VXDD6zeE6yEngSvVGjogPhrqSIGBCOWUkmQgPlnPN3t77UAhjnqNL1h8cOhRVY7OChnnsz+oUaJK5w

fS389Dc7T6Yizf1hlBqUq0A1NU8iaxSG7qWa/QCfOb
HKsdUDPpxM4R58h64yrsZdJUPe8ACe4gbVVW+0kDxiD+UJyItL1K8eKHiCF7egw7AfS8R4il2mMFeucl

jRRWCS3sXxbiWQsRjx9MkBQEJxpZX7V0CrtOvbgB4oeWjhQZI9cQbnkYiFA3oUUU/pAxH7kEhW21422E

8diKiQsX1qtVDw/W/TCUFsQdQLr2jpRrwxFrO4tZS8
OrtEpmtYrcBCSBB/Gz9NkWGlIFlzh7BUPjqS3/BFXF8kkgifaNwoZoi3sTN6Pz8i4BT6Axg1EUzDDVOH

dxf3HC9huS3EA1AW/izMxtUytA6PwBlsiBcuRJBpgTyiM6D8Ekcho4ExLrTpXqqQxPmB8i7d89LCIjRc

cf6Y7PkcJ6EKAoll5lb/DFe552e8T6b4iqyncbSqGK
1yT63xEz/G7h6L9BkSTAXxpK0QqYwuRhuOrWXMAVqoQ6KmYNKvXLWwnpfQhtl1vEmpC/DiK9Lo+N1PrZ

5xl1eLBPznMJy9q+GNCh2dnnDLNzqgsxhRiTZnSoSzyNBptuNstarJnpelCTFt6Tu6fzGRWZLeHo0um+

LEfpQBOKmz4wdRauVLVehDLlKltcaaHn07DlfJ3Xdp
og3hON318UWQjSQhQsQRnO4hfzXbaWjPR84SM/QsZieAcEJ6dobi7Tcoskcjwn+Gi/a4qVxflF11/+K/

gK9VUeiyR/Ih8F+Cg49rzDaVURW4/JTyPNgZggNYrsxXV6cdCjharEBv1XTyBaPA1s+utuqLQT4h66k/

bjRoOybBhYZMGAUbvKrQTYkLpVHywtHVQn1U0tGtlr
SaDW8rCia9yCqmweYMOWY6ZmWY8aAvWIwChflleCai0Bg+Xg0DoyJDxv7LLNL7c813si204va5/noECq

R4aR9U5BMJSoKDkzV/6lrx65LetDEbRaedXStMQW1uX2eIugz6sdoA1OdyWUhAfAKmuEx56tZON60Mqw

03kXUmAjyGEeSmOeorilLki+NCnnhdO+UnXFZizxmJ
utJnck+kB6zFIbI2it4N8pWQbGCE6lBh8wD5/FdPfFZU/8QDKyQHlfxMywkaSSRtSlC3nJh7L6e/wCZi

HHMS2CwKnaOa8ctf3ggBSBtyhAkpHxwfl2ZnVvq7ZUBQsIY8qYOGAav/7eH2iS0pYFkcFGv/xfD0zPOw

K1OtaA77Ov7upBgnY7M5ICtUTasHEDdZFAHtJ2Xo0L
0vDB/ZlIO4viyeBPmpO9tOmcnPWqkR6nKzGVFtoFULNgZa0gzC1l2Yt8d3dam4ArcrwxQDwXiQdMRlTQ

FTpATPmUtVHNUUij711cCrDACw1WZEQAzj/gJK1K4mhfz851iTs7svxfpFffd/7m89Ffdy3LM1xr0lvL

WjC0hhQPHOW+JNhRR11SuGmGtDzukKwWjHaIu2O4tz
2vUAFahsi8OeosA3SRiJq8tvYpopFH84xwO5MA4CbpaoHKX0hUkp6Fu3eu9Nvk/SbSuRaTjznJ5M5gtA

CJ4XlvNODMPgyfwbUcqBq6qxoz2jZFw6ICpTahe4seyvxpo/s1xMseuE/j7VDr/JJIxr/N5eVY13gCyU

UT+nx+u1+JOIT/cjdj2r+26Mj+qxdZKPj9VUCuWMn4
efP9xYbjWRAZq3piUOv2BJnQOAWgIWSXQuIa9cmljibK8Im1kLqZ/btaUWJZclKu1yX9EDkCoTHRA+i7

Cl8PWGQ1fXP7ILlggO5cT+ewo1Ub4lOKcsrovfDrrOddOb04mYUdaInzd5L/PtAaNJaW5Q2TlHvnC6hg

XAH+CvY0oI8bNsWiKKWO4XJ3a0If2o8HT+btF6gX06
T8g2EfHt6GQ6e9N5we2Dr7ZXcqbGaCYNi3fdW/jLTf2/zetg1J4dhTd8ZSMMoD6pjHAYblopSfGZRGBv

3e+s3b/d3sYr4adzfh4bpJwZMJrhB/8eBbR5kbnP/QKA8PZlHOmSUo5DE4q6oI910HGEKxpNtlKcus3L

Jjqa/gy5QfPAxgrS/Mv09C9KfHmJUJ+JYtJwjUbllm
6qiouJBFs/ll9uuJnLo6Q/Wr/vMuyrx7h+iUocuih/eWBUxsbesMd0DsQ29H7zvBea4pQTEuEK+iftV8

4VswdSXvKO475HVu+6btm/cbVJQS+LlkWAgsU+SDvKlHpJMiRzIU57OEn6fkpa484uhqzuE4+cXzt+4v

LnHJueFWG3VVlEhn/3JBY/K8G/zCm7jXxa8/WIVwyO
626jXNbgePP/PuBgZJtGVHgcZBvdMyZPOTlM7wPkSLwghSLmafB8fBJveZzJvd34SSyfjiEn8KedVWpu

uW3CuN1RjMU1oTR9PhlIiNTkMUkQhkVKaneTe0bN+o7PCrfvoFxuTqlWsMLXT6m2dP0FCB4iayQ4ree4

/EHVkl/ioFlNO5yq9ksTxP9cG+oYlKwA+L0Rme7imW
gCJjz4lH/h9CUvvW+0MNFytKGVa2J8ObTT7uJPCNezkV2WBeOBE5fxR9RixRxrJxwsymq19VmhX3X4q3

fATR8Q9ry7c3FAB8gJ/NsnAS9ZfV4N8jvL2eNEgK3syP3BZg/qQ2gsvc2ArxixZ09MqkM2ciPqDFTwhF

SFprMH3Xh3OlJEj5hL6YVH0AAkGm2y/8Du0wcD9UVl
fTw68d74CYdosf8paBAHGubECjShQws+q2BGX146mO3MLVo1If1jmkem6iUzn8wmU62t0jzeL14fHe+A

J22fK5BKflcb70/5XXnIleIELJh7iQhRtSLgD2kA+4q1MkTlGAr4KtoIMfVTa3RycXLIpLCeO8Z94Hla

+DRFxA4tvv05JazKjGjkSxHf2476iyu1pNR9isopAo
b9tRlmnGtQaDW1DS9ABfpP4mh9/MTd6VccQ54QxbDc2jq7N2T5aze4fOWxd8fxCVXcoWbxxCIEa26YU4

2tFVZs/bw0aqx9koq1AMiV3ZzWrJbotJRqb3U0SOTsvMlX4450NIGGzyk6Nh8x2aRmpZuxBwkvGsQam4

fpGBp7cYesnybw/54+/TiK6FKKpWFoqtbizdFiydET
zqn73a474FSJoxK7flaJaGlw5rXIP2iAOkfB3O20BTrC6KVSRivUldboe4AxAeEBl0gLjmjymoNkO/SO

n0CLWSWdTwrGcTAUXaY7KaEW4irVi6Qlh1QgCWhZ1xzGbfzLocyNSLM6SSkcmgs8fN3ywWtbj+aHlgM0

rBWqTlMJPp5pJThP9xTa1IMHuNVXnj419IXIIeEBT3
I8NCcxfb07lefihxo0tbB8xfjs6EkbMQmHIpQ5x5M0n380i6XuWvxYqgoLmLx4cR5uFfcufZ5SN+ayrY

a62FaSXNYL9MZVVKMYcwf9UAMeMLU2zKu4TJJUZcf3vA2ODxPo/vOeGVKsuTlYcr8wxQe0RVOIpFrT10

cDsRJTxEMAW9CLK8yJ4JAV2wTbMccSV+HWy7e8aSfc
IRYuwVk8YwZ4YUOq8jQQ/eEbtXxDoml1yt+J8iTD0EkKByAvJDoDPTiFn2uC62mSmzaxMo+xRGr0AxrG

HeSOXlXpPtSm8zObRjk1cjGvA1v9Pt1/KoE5eX8dwmA6wvH8zXjibZKNq/9k6lUTHPnBWBgCqkip/qxd

wheqMIy+wZ0EdZEmpkLk7hJQOWwBboFN7wSwRjpcF5
jSkpjZ0xoeNOjrLHluIMVPp4EJsL7UQhck3qvqtPoHXyJ3mxD20N3uK/66gCPo5/5n1iblHxdbrLnHNC

qKe5I4eSyuGbPG5Q+fJcqA3DbHCAID8FAVIDRCS6kNZeOOtd32VoJrOu/T1KikgEBd0D5CdOnBB79R1O

M/7Jx/AVgQu1iLplRyRH3kjmVHitGq1++Km0yQ/nW8
BxXGH4VdZUGZaFdcoyiQzIKnv/tfYiAWJO1cJ6RufQpAFCLIVLjW5B8DZznPQuiV9/cteSMtZcqRMW9n

zzU8rNwAI/a2dPk4z/Vs9FopNFdys0M/Wj6NV+9GsTYok9k0/d7lrBWOxnhodfng39Bkd5fmeBpZNzQC

mljbj/QtKsrFp1Rgjgk7t2p4qGDDWLYExiebvoDJyD
L+KEoq5rfxLlsYYfS/jwG1R8RBnuDDLJA/60B2q8V3NDkn5CGGKOKqBOfPTM0QQb0kr7oqOQHvYx9G3t

UETardHHj9R9UuZQASD0bN5JFcF8swNJNTji2GUUFwIlVQtiJ9ftIvgQ7SEeusTVS01VGjiYK//YXECf

jOJHPJZCxQSQ2p2E75kLURYWX+Bjy1RX5PvpXSQc3W
VLuoDJHgKkicl89gCx3JEmCjIBvduO2Zo6quHeD0SH6j1TE/yTbbPXlvrqZ5GyXbBiffgm0AimVvE/QS

Q5lsuJly/9MDBDgyzmD0LN61472x7gapVPJ7I36L68Y+RzkoqGt0ghEvI0M+JfYL5oWyoEHpQv/SzZEa

YxeO1wAirHbDnUgIqj4wR8yZcf3/fcEwOJRYvKoK6k
cP+OPj7RjlPEEFv9rCyfrjIjknjtOABVlaJnq6rcIDDUArM6AJ55IApBsWX6ysPwr28UW5RD99tSbaHR

l/QGkbj8op837KUc2G0ClenA72yB89vizGT6FlT+fSQ7+9eLhNOxHqH7eVi/UuwFXceqJnfwtP92ZgvE

LJXEO6bYD4fy1WDHfUPvh3sLSwM5v517wnbq0PhH0i
yjfUs81xRuDzcTCFTTu3wHzLEbMJ0kb/j2did/J9SJIVw5zGBvEA4KywK138epJ1ByFjdIkcAL3fTei9

1DxQFV9b4zTCDks1tZLTya4ypgF9cF6IOhsDIqCPIydpuj7HV8g3Hq5yk8jUinns8lMB573EWMA+//XK

zc0AIpDa7d4zf3Iv9vo4tsaaFB6VOWx3T40bFeL+cG
FprbMOtng1Hj7jj4FeUCOCfocCKRVF4nhD+RY6xSnWalTJz42pYE8xRjuvuWMsgyLGkHYN9wKdQQiXL2

VPYyd7oi2FgkdIuyWMbj7zB3JqJPWbtUIFGdgEFCfaM7LMNXO6CbSW8f0EXS0MViOwOoENlEo/obzQSt

VoQ++i9dkYrMNjFwrXMZ/CIhfLX8ANhHCgYBW3Uoow
I2FWfzMsFSjSSiUNsNWKdyU8lvEr2a5qtJuFo1tKDclDTkWkUIVHkaY5g8RYrRq0inqPAOgjJKYi8UcQ

PXJ8eGFM7niS4mFSH1X135vkp/2WJEN501PLa1o/K6Q/UUWz2oIfWKwLCAbCzFme1h0ep+Gv32XzB44n

uIg+XiW0fhiCy3mz2UGH6qj8RTbRzLiRtmj/Vs0rIK
Z8SGm94aJkRVOZez5aV7mm21qhnsAB7jAr/8RHTw1gcrzWMxRbs/BLCgJg7otT6n8PXq5G6Un8uEF9pJ

YUY/JfgkPqL7mrOnDs85vtIvNzz8Gt/N9UyboCVAh6Wj8ZDWSO0O/ikg+4j7PbIundQYHan5UFm+RyWU

or9qXjDGPcG8sJVI+qy+JxFyjqk7GZkyKAjGyg9ECc
WWykE/ASpslpuZ6UD6Bz0wewbKkFX6Axhi/Ag7NrLjMszMckRq8pjpbQvipWb5cH5VN0WBKs4l5f/0V4

Xvg3yjAGSdxIi4PTZV/a4voi6yxqJzzvDKrQhVvIrBLnRRzfGKcsmNp40az0+co2u7TxQ0ig5GgmqWC2

QclwE/qBQBcyysdFg4OTWhU+K2DnYqkckJAlkdkBP7
39Umt7D7uGiBFENw6U/f+0CXx+WV+UCjUsyGRZsYo4aABhWIiXxyfId9GabAEN5QSoahy/HFN7U6mv7r

MtcrOFPteR5Gyk0epx2gmUVS2fH46gGNex8+m5eYPMSJRvHsGqlEWhv04y+S0wO33ugZF6xrohgEsS5g

HTvNqJXAYTmnu2Nd9Uf69AIBaE6dxnCVLo2odKkYX9
2f9wsE/usE937p1A7IRPkyWE5n79p7lOyEVSWBBK4j8/89owzm7JqJQy+ZlRvP+fo5swjDgJOREgluRW

wuUwhH90AbCxYS5r18T9ptwi7mA3WQTQOysOgk0aXVX+enptdys215SxeKhLJY9c8hH+VXvcg0QqtqP6

Xnr4oOHa2XTmcdF1DNzM6ev8OTzQ+CBvfmh+YzBs9u
UuyN2PTUk0aebcB+1iBgwQfgcxjxP8c645qUFsA9P9pEXkM6TEX7r9zRd+KWpLZUq2DHDdEuSZ/mwLYm

gvykrt/v6EWCYmPl8Uvr+aVVOEZ7mITgxDOq6I0cpfA0SSM/Ch0HhFFpB7pEPt4xn3uw/tErTyOKmzK9

zPxdYCM1fuxPK3eQbWyEO1/dCPzz2/GXwwJecbuB//
uA9Gpm4uFVgf/EaUa++0rG/+vQb4H7GGIsXxx0rjW5ZcFa+7ZcJTtMhGLXAvxAcn07L6lrsuWe7R2l2O

GuhetjqK4XImSZu0xcoKJ2qL+Iooo5iC57D7e/5Z/9uvj2xfdSd/PWqxiduXjfY/dQ80gYH3Ejy9VXlN

DD9+Yt17vXb/D31Ar9t/EA6wUFy/XE1TkDXlNGNsNp
R+RDXvuw/xOarz0pEZYfWZMD9si9071Cp7bR/F5JkTw3H5LxcUJZJDe0/sWQtVOwTY1WZR7dm4MbLNMd

AWRtNF5oqw29MKCri9KqFEt5J9L3kWLlZ9ThU6UxZFWkeTSFc0ppCfPqWOE3FRZwTwnrON7AQTQraNQx

RFFeFZewPM6zybDybAJ1BM7OwDjoARxwOPHVZg7+Pi
3THFPdytSlWyIzIMOlZ95erJBjvSKzRvHePWDxOd4+Gv9xlfMjXhnOBdWfCG6ykq28GS4EIH7hzTaeKo

E+PoP2baGwiH8KeXh1GFWcZOBdXYr4OyWzHCDkV22GF5ipZoXeRO9NVE3JAM4df4AwFJQxHGMtXF6tui

85OBPok7MzIIn0N1grdir2zVFsHmWwSQIwRiGqFGFu
ZL6tFi2FkBD3pXLfGH6OkUVzKY8LyFLhDZ6AJ1LhSZI6A5TjkHZalvUiH7QxMCOiIJWaa4JsDM9UD5XG

NCCgTWJvW0LbuD0sH0GlG0IjC1HxbyeqYZUMCl9ReGK0xFElOIUwG5ujhBnkcKWkYVziP9GukYMMCFQR

s89ty02qllKqCO7+n8YjNKBiKMb37px6BRmVMPq5Na
KLbrSnRYhDG1pJtSNI9l7AVoIk0JJaJJHjkATHI9bhuQEFVvZS4WKJTmQ/fjsP/oO99/Br4ZWkVyz0hI

a13nW/z7Oe+37W/c/9ENYYcTTljbTaf7YKiQBJG0QmOtvGYeYxBerUG+RUzAysLMwMTExsnALcbI/5OJ

hIjqv947qcOHjoewxyZWpdXRqoTgp9trCjBl4qrXRR
4LLC9xvmG7LX/09q315QOkGTWyvwOJoNJWyQEIfUyCxKKByWaGVV3Q1B+L5XVrOEQy0+AeEDIuK/C1oe

IpYggZOdfFTr8EIx/84G/y5U9GBwOyCWHsW/dewY8DpDCse5nHaJ43CyU8rnvDAxcCm9lIvMIJG83Sbt

A2vxWwNAQYJQbOWWAqowsSL2Hv6Pcv3bmvCkwzJZ9U
fmHik2Cp0KL60gdV8t+MeIyKjomNjUtE/lBWjri8UHG2eQzlrol8m/SIG5wHgi2cf1RibRgnw+jX6fmZ

2bhy/6TKxtGT1vgx/i9v7yuP/TAm9pjzH+X//jhQXAwfo/xv+FW1WqBkb3dRj8CJ25YKZ8/FtAgYvHLo

QRxpxGkSEu3YTwXGYdkbnCnD4grHwsa1zti78T3XYN
bnCh/1H7D7P/Z8TC/j8x+z+J/V+8lgAkOFh/c1yMYPRS/Lp+WhxC9F/8F//Ff/Ff/Bf/xf8/oPFf1OC0

2tkX5wSnQk/GM5RtWPLlgESampmCkYIm4gDhx67hKCy4GHpTivusxlL2PIw2nNpibMGkUxV/3B2pPScI

aB0rFSjw/shynbL7HG9GaPKoW50IPyrkHhsMLciFso
u774S7zKXlVT3r5tm+Zo84LPQ/wqw0u1ffsD9Wu7snTvwvoamE4yelYt9XKd79I80TYKC/WY0yRved1x

lHWUIhHFFZfXtjBpodFsV71zLbRlzsYPgI1bOoxQnRZ2GpSjBGqJTaO2I9BC2ltDCmyrOwE7aOYLvumo

IT340QhNLVhYuC1Iv9Q0RLUvP9Va7qmWPTCYQWqqyt
ILKuMOn7S6Xve/1C9VXyVVU0Py79t8cvAMNzvMBLzpuxIbY5LmXFXVMZQ/xzIapwr6zMmUPufeUs2+XL

r5KFxJ/dzeNywBWVm4n7aQtawPT26OJQsqKb354vYhPaJrjEN5swUAknztZnfLQUSrBl8yNW24wlo3bs

9mqs5X7voO9xKN5R0LfUY2HG75XrqilnIu1ohGhEKO
kC35I8UJ/APuXu6lWUoskB+KbhmkAEa4ue7OEVAmmd+2IJahS5weCVjApGP/Mu1vW4Htv8L9Lf5ZKjhK

2nSfzeo2/3gM7c+VbM54VNtyUj7cicdAtIh6S3a/Cgxph5Zeg72VH+xYnUDjHr3WYonxn8oC4A3Qmkdq

rTshEk2BIOh38COqBm+kdnATWdExGsCqUZfY8DfeBA
cL1bJyqocbZXRhjdHa1ukIj+55zdefu0MkOD84rN72A+Cmz2HE9bJe7goi0/xg4cSX/888OHmIFnLINu

prsRsmAXpFINXxxeVePchy+QQaBWV0CYOx52/Hj4lXW90qsVQSroI6H76BmQqffa3li2DW4LdvgqCVE7

h7m5V6ZACMqyhq58dW1xlKPE7alLe01iOQgcgxhiwg
XfKacYs12cLsebEMVcJKPKoZZe8y/0anYB9lIYumNaDGOdmix29Il7Y0Bp3IVhz2f7To83Nq8kZ+Ornp

y8pew3PWrvTjH7llT5MXWrbaKA/X6JGlROYmnrK6pOtQ1VkV7h36nMF6L89Xqao8pBKJ7AHg8x1t1rxy

h4rRMjS5Ax7Vp4F+YLk1RgM/c2P3D9DOWeRa4aEmTT
T30ewveS+OGgM1FdzYQFb169CmphPczvlwoDCARvP2muDCcDvFs1lWhE+IC1mpo3txQsDzNJ3n3lMNVf

8XMXn3JgpDQouPV8mwS6g+3k99hu63eExDra2DbLIxNXDnAnHw6JkBW0CklBzQYPmlnkOyqQia/+JaUe

mP/lnXdZXKVEd1bv7Yie2FVRkchp1QFW/MqK+aqSE6
GlvNnzImtJGyF9iC9WViSlhSu4xVcdq4/1SfRrqlcJ2/aykS5F9MCqq4HLeEG/K8U21aBc9nb32Itpa2

kY0OOcFG5mVPUiD+ju3Kt741qXe9irieJeKncWcuzwZ+AejOS9AJOa0tCeUOK9R/0WldY3O3ItPBUTFA

jBNaCP3Ch0IHSM7pgjvaOg/+ApI0faSnl0tYAxSWm1
Uu5aw4bLuAE/bckAp5OIKhkfZwWBKTzfjaDtCd3GWU2yEAMi5uUnY8BlJuZYE2bf9MzAGv9mHNWXoGYT

SxesJWjNTxvdpuaFvMFNI7AubLYE0dl6nzPyaohO1uYyJqTDEZbH1g1qiCu9xOFkQGHVX+boIx7lpGEA

/uBJc1Cue6BMsq22GxQZqdHzTgnBQsDsIpjQoYqoI6
Ce4UVQpq/oJqtmFMWk2uDUn49E0bu6FFBQBPfU6GoLc+IOK/eSsSP3w7Z3IW0ARTtypXvhWh128qPlqx

mnOmjFjEIj/Jk4idZJHGpaj0GMlgCblx8S6BTFmmSahLvS7zsE1RQdvPyij4NIQEtu5/yIaTc6VNU04c

vqJoP2FZabjHooPw7veWxLwBS7uKj+qw+992aEYoj2
5/BMNd3PFFIYFW9rOPO993OdYgK76uHSZzqgNlH2Re4JxlIDxbWbO3eVCbDF7JSYFCrOkTpz5l8dozb3

w5P4vZpp4cwECDadDAWMVLGHob07R0uN8SL2nf/jtgdKZq3vgvPEsvLAcNM8nq7fnrSTF9ch3VYSVH5U

AtfLjhdIzjeaNKkSkLyjbqWJwomvBMQ6lDoqGYUIwL
rW+ow9VdSkzp34fkZCfixIVWfCCm7SDMj4NUloVLIdNQJ2NCFAJjV7NXYtyC/xKONwsUog6Ygpc+mP0t

yl8yDpTnWCW434mPI2ceb09BvLIJzOiejg8/dsCTcqZJ0A06By7zY1G8StHxHM6tzDFjC34YDjvbOaRV

CQaiRDclFvS0/AtShROJAAquRf+XO/2jg4G4QSU51K
JE/vrg1XBxSo9G1boeTrcAJenNpIDfM7yb08HlBGXR/B+qGYc1Rs10/pfaL0hXxneUU63RxHFv4kVId/

w2KIAx094xPRhiUkinvd4eVVKmYl81zUV318Cz0QB1GK19Ozx7yZt3qdZA5WcJRGSv9d+Nvc+zVU9IyC

MeaJAmgJXsQFWRYLeV4AsLgMIk6YQuC9SPSBUJlvzH
D7NiQsJ95KNNX58+WuLKp+SJw4Kvv0DvtlRJ2R+q9c5bmt0Z0+Z07BnJoH3yqbwo7ewrSgwbu9PnAFuX

Aqv4VsSP4zNd3NnVqc+gxHNwtzmj3hlfgETgSphiQPLiwLoddMsFB/T/EMtutpryMgiZNe5TM5bqMQfS

7mbwrpaO2f6yXY/1EcLWk/U9aHg72bg5+tWVFMO+1r
QVIzpXtzUAQMa7U1z2P12OgUulC9kBCtKvAyBs7j2TnZLI/tp9tsrs6m+C/ymYK4NkHQekwWv45l6ZJn

sKH85ZDawxh61SEIxKP0im9rDeWxuCg95UnZXV1DYIwyRY9h8ZfwSVwpgG4JChPNboj6oLuKTWfvdNMB

rjmMO9IccxxJGbiaPG8t/CnMsSgfszgmQi8ogik+0E
ppTSgvlEJzEHC4L+eprAlVzwkhPLJbeYJZwdBnH4LTY1Eg4BZZWN3qn0LlDCJZU+viJl+5vq45ETOs/N

N0+L+v/PZVxj6n2x+ZPZvuXoh918zL7Gvj1ffU24xKQrmWT27n0a7XO8E/8kVg3jzqRzZ8hoHrjSlHk4

Fn6QsITpwNu6b3nitBcFpSUqvGEq/0qsyy/NHYO1gP
OBRuji9F/lzwXZ4oVU/4l1Ytk5w4k24q/H1gjgIewllcteaw5J1RuRRZBLarcy1HfoiXmOW0qR3i83j1

FfocDXW9EpH8fjdFBOS7uSmvTl6w0YJxTeSh5bxZYmiAESz6hO0JfpmqBeTiG+7hNAJifPM76MRlY6WJ

1yUOsSyY1dItBxJfq3LDjfcCXlwxJwBEViVMAKg0yd
ByInC/S5mDY4qpsSghUYxbXTvpi5TqRz8xGTI2i3GlUsi38vScw+GEirm2RCLqnf9u5UWFVAcwMX/qb4

rheDB7f1uAx4pBesMPNJRE4D4aqDWV60yiK2euz0avER9+iT3nJIl55k6/OURpW30ogI7mn26SXJX49o

bhuoWjzzknY41QPmZPlvv7nwnbglNi8ayQOF9oxEqT
roClzcqQRisO+a3h3wDXgCn6e/sR3E+ve8xiJuCi3q+9/faYo2JNrlTy1BwKJB6mJeJj29zyM+lLDcyr

qpumjdDD6XwwdEOrwh9YqWUbWbaTSka2NuUpl96u4gzszKTbtGtyrBQTrzhnSpKScSLTyRh2Ho0T0+DQ

+HFz6Eu6sJRzcVtuORoFSRVWvlxxocZucuV5Zjrz/Z
w3NIH8IVYpJECOAAZcJagqY/oA7a8sDflkYW4BDdYSMO9nGmrwR3ansuupSAtAFcrQhTV+09xlIpOnOc

TFYsdGD43yAz068A47IvxeH9Rf4GRJXq5GEP/Z4jZLeekDqEnrq9e368KFr1q5rvnmuSYk3GWTZdjIlA

iGI8mHLvyJt88BjtmAGz+LJj80DzPwR913a50hBWyk
XZrAUlBtZNYR/1A5jeXaqsbDf5Wo71P3zvQU7c0WENPmzeho/+LjW0NPThm2V3jDIu+gX4DOdG4NO+Tb

wwFCfEL9qXUlqoUbQr3RUFfoYFSdW66EJ3ZXLFX13g4A0rOi9K3+GiEqFBwLGCdO8E3CqX6PZptlrgEu

mduUc73o67MqUCEDzGIaFtxZxu3Pcr61F/Sage+8B1h
5QlmYm1mL7bvYD2P2ANqNQta6CAVLmxuNJ6v9FIuNkMusya5G6w+aMuioXh/WoDTIkT/WbzZRGGsANhp

ioNzMVjZ/BLU1MuRrxKCR+3ZxBxgnJJmKf/r33/2tA/ib7liiKJU05H1rpcCwzePrMjiwEflNq+Vw4TC

LYbaWFZZfM6fLLD/UAa8sYUkOSuOoI5Gxy1Qn4dEVD
4rj3PvHe2PE78J1y1taVPu88r9qYl9nEbSHL5ddHe9Cc1UdC2FRow3c6E2GiPUQDAiLr4+njLJvOFEWb

VC0f+emRoPxV/0cvsUUUXc3xrdwBEkM/W1jtXeESJWV1gZ9VuO247TKvs6Hz+ZL2FbnkW0F1C1N8LJTg

eL0fpwwJk8UzCCtVphoym/IbImeKV4O+9nE8LTC+Nc
VGL3QJZ+fSRpG0J5We8a9PSdWkDPBzOgjI/EKbQd7U4xAmMiX6nKpiuHYvef+Tg4sj6LsO7/URvcJ+Af

BE6n52xCNPkPiC9247og/2HI1/X+dTLQ8YhOeYiy/ipROOGHvbwSnQLpBzjxmgwQhF22RkAVRthd0L66

qNnyvyQKdmxYavMyleb0fWz9mOlU2JGQUtRwuSYMhm
7qE42ZaRIIJYPj8dQ6qbeY8tiSpVRfEjFm+6q+sQ7C2ZND+EUHplXrC7OOs171gSWcBeBUlJhkKOc7qv

LOUeHeA3DMQPa1nRUh55WH8x/MJHJ1oKvPmX7YTVhhTI2pRB7AZ+07EaWzxk4Ffy3JTd5x8o5uc3QtcZ

DxzPy4xl7FNcJy21iShXWWNcl4HCwLQyF/ci0Q7UK2
1YXnHL4D44jwWNBtnAZnnKorENbgzFre0xVt3ZMqbQYiugy+w0cZGHrmxnjAydZBrSSfzbBEMQ5/R9Ym

/wC1/N1sfT9L7UFX7GMIvMh5+cIAgyIV80RMF4pMCX1dRF3z/updgKpulvZFFpy/yodpXPp6Mb5B3Ojf

2KHbZyOAsgTvKeePG3D1oUungafpgh78cxCHJ+izCa
OEDOU3gA879tf6RAZzo7RDlA9uZjHFP+fWDzGhh92yQC8i+YJ9RA2KzG+GaOsw5skgmUJmH+aOVeAblz

gMzWiq/PlyG+w0K7k2Xzur6nFMUgon6CcVWzsQmcuBikwv74yOTqdJm3AyE+fRvgfbbxKbHkHlCfzRAU

rUcxTyYVMzFepZnqh1NewIIz5Ru1LmUUUgc4OTal0Q
Gjs1bIuoFOMxGspUlTX2jaGe7KiCxl74k9R6WsBZ9KRhYIVv6efYW7fSJNE5q7qWnMejRUHBb8702FMM

4wS4ZlADDilhO9i/tptqOu+cgR5DxLtAcOfWRgN65A+o9LFaIL7LDrHs3fINcuCKZZ/VMa/mkGB0UlJU

CP+QsWwKNtjMwJWLiIJB5PK7/b/qhPMvfQeLPFYxV8
8jjVvzglDrbFLBuhWZd+2OAlyGfMFCYHWEdxTi6LDphK4tkigz+nvu6Wh1WCQYPABCcB5/gu4Ap6a2by

byctzhioi/u0TgWVY+vefaDv3r0koVxTQAc28Ifv9ZNWD67e8t1pck09M0n811EfSzW6YtSHNlRgI19/

DQwjz4k3+RCAFynH1x7w6zW7JcHgakr85A7r/HF6/1
1Git/nhxUEI1dhoZ8FOpDAlpvLC4rd5PICWtyw/1sOcHTSL8AnGFn7814NfH8HoMIbNsPgRmaSXEeU5N

Ly9ppXEZiHdQ6D5cksylwS6kz0A2UWRGcz/bBFNworKUcMnnEVKJkOsyhfdudn2An3fKi+sT3W5fgS6j

owhBM2K3OC/uLAcS3YAHzuGBz+dTJ53fnQulcC2yRc
XtNgelSb4J3RvMu9QrNpBEd6dxFIBbhTEsc3AsU0tX1h26WF6JDchomjewjRykMLijboYhZ3gR+D8zzM

JLMT2PLbvUiZIy3AI758pjW5EbRxlr2m+cSIi9IUj2px6rRZiTv1jjpMFU4M4/IRA5mYhuWE1Ns7sDV4

nANGpTnrrU4180HfQFXcjqpauZmf4gyWJxxx9kM1vt
qq8fzdLqsT+KrrwWR5/J3SD2lF1gjmRxNukJeTEdGd7fnp2oJl137pklkqm0XHkRgKb4nt4QhRfVIu3z

JAF3bBqSX+rF3jBTuvc2oblHLwNN+QhI66NfinWAcBpEUB1CKUVR/2kZy0imqBNGTdt2HVzhs8IplOuf

kVKVT33AMfbPcNnx8iqEvHgfWB7cTt3N+D8hyxl+oq
kkfHTq/esn+JCQ4nt6uDGNtEI12tNhoTHfIR/wcw5Odnzdomk4/cqzsq6ywzlquuBLHSOYAhAROu+Syw

OC84CJYO5pxj0Ss3qr2a0L8w7rMPHHgK3R9i4/jwalIML6F/PLBubo6Bb7b57I1z4DAG5M/pxNQMMqwt

7xivo7z0tnaEjsjpkbbYnpVn7j4BQCI7Z7Ig4CfpFp
eXF9qbiW2ian/Y4EsDhT2pKF5d+WMGif4s4ky2x2BshLPzSHysIasPVNN/3Sydu1oU4FsF59zxe8vd0+

HksjYpgRd0zghdKlmP1L5N1awoaMFa6+/WtS9WY8l3S+Or6CnPdLHGqPbaiyiYFPBkt/J55ppEKviN+d

LnwHMC4WL8AG6D0S7HejVALpOrY6WjYNEylkb9YtK0
SnXTo88f+Qq5AL6ogYU0e6TNUQa3E0ReT22joubnUaXnKfF+mffD4HKuBZWSm/9EH7xkTCznWmbI1vSU

8rBF10mSOX1p76s3355PVS2qdif0jlCs42ckfrT2nd+DWWA9wSq/WspnpQ9728zUP4PYHP0tbfCV+S7q

KmLatE5vQma6RoLzGgj60Q8m+2IO98a2aAbqXLOl32
t+1xSYMjHICJC0r5lxDkSeY06mh29qMEu4zIebuYA94YXn41sEZpsSgsr9ogYZ+V9+r7by9sqaPP4iS/

C4sBvkX2e5o8Gce6/VnlXHDxETYmfBB374CAACEAGUq1nQ/RGdSbHWTYW/Kd1zG5teR+HKdJyqxzaz9+

T3mmPZ1dL8hZwEPiwJ94FydRAOP+HW32PHYxyERkqG
O3VFkOY88aFkXeKtWL2XwsgiJXaaYusoQaj96FQgELUcn7jeNqY2Pd487xmxCEqvbrK0XwGodHwUdNy5

L5ocIKY16tE3dZuNK4nrG5b16IVb8NdQ/vaxA4qGKrt81t9OTt22mctPcP6qdIvY0NrH+nkdnacvCV1I

/mJhEA4vSBXYVpolacj4IalzHW4gSPo4cMwINaXQl3
7CtevnXbYqQfVWjKxl1pTmQLGIedQ35OcpCPmxshii8E9hJrwznt7umDmZTHoiMlMxlZZQSGLAbjuYG2

DwaMns0Bx1SxLqHAhmpIqxoUgoPzRP+DymbIRDdeYgMaU3UsbEibTJcE5WdcudoZIlJZWzxKQG4zKCSI

ft3jio1gSNdqVh8hiZ3z0DpCOw8tC8RftN4xJjSYlf
vCJtIcM0WxTPt5JboK35VHrPeEV4RHI3TgQwcXZfFhuFdH9t6zXeTob0GNPIkKVQB6Txg65JG38ZZuez

vf7Y+790TMa4Q+sLu2izsVvjnCm+urhr2l4pZ8eJC0zlcRaN4CBkEeO99Xv7ifgBzQI81DxxVvnFCrIy

UiSW+XJckcvUFDKEgZhy01AsAvYo2xABZC+CpZS+/P
8Ijqmtt+/xcS5RqJeydGxyvO8EcIOwNUs5Mbuyqr/P2wkymWwGbVvKBnQT0O+9AUF9tJ7tRrUAXDagpx

WXTBwj6iq8iQvh6X0FBe7W4QyTdLpx2kJ8LQ9lSnHPLWkM/ictWYpeWqNRSOGqIj2d2wgGexs/5Y+f/k

HQokK12pNyjHYH/woQWkdljzopprQIu8KhIWoIgia/
pTtKbl426jYtZWNV3VAc8UMIDc2/pGkN7FfEyIHqbLTeVwS2+36IZGJNpAcD6CCuDeJwzzFsMnoxvkgo

ZrZjNwWZGHNgt7QrWzkIDQwWB5JccjqCCd7Ln7PouL3nYJrGwNOU3QyyHv+sJ5E4lMFP2nf639QCnRYg

oOSd0TOFfcajtvKgjeqneMoYOLjjURnMTp+0rbYfXj
nkqaVoUCPudcFUV6L+QE2ySmacFYp1BeEH4oBHpfqG0hP02/pqNTxtPB7He82MIYjFk0Nc3t+CZ4PY3f

pJ29YUpEhvYRzlMPquksrOP/lS94A9UBUGDKvIjTkwwguXWVFuuWo3gXBCT9n8LJYazJputJDnhr/v8E

/NF8xE7QVCgZpkWKnPavTL2Opn2hMvGKfASF+2ZgMd
tQcq7lWXgVQlH6Varv7+wKZDee3UNstglI9D5N+oCdEWLMYf2sKyiZOdtIbISaROYZp4HpLZdD/8LLRl

3tArFy9uifnphDlAKI11rsov7joCI6a89fmEtPKWNvEuz/WxPI37gJTbD8yOSU5xqQCu+kO4gFPrM5Yp

yAHV/y64dY4U57n9GWJJyzHt0xq+7lP66BLCN43Bt3
tcXmt4LLhUc7fiYtypSOlyMwl0XKlquwyr+5BwWLWtjjqAYJMxAetGxn/zaeYbEw/OGKy6cSHrgY48bH

K0sCGJkvT+c7/9Jczzzmsw4pp12fYH2r8c5UyWblvtuJ/g+t1jUsVzSUayjAKXsGyH4noWWNds3VE3u4

GQZ5Pca3BZ6NhSTytgMDglQoYyIR2t6VzPUucBKe6M
ImUX1j0kVOHdoluDjt8UcoMxt++B9BBI6b1XLs8jLbmfie5nspBSYQgX22ZzhbjjMMKfzcRd6i/j3KBX

8+Sx+KNz9UzJYB6xOLHIYnJYtMBoVKS2192rg3CRax0u0dVm6QOk3d0t6/QddCgLYfz8SrdN1YqU2fes

ICb6EUjTYn/NIuvszrGnRZkSJo0LqTpSfmni+aYb3q
Gem5+Hj+97VVL/MnpxTC7Je6kiGFGbnMcF2KcgL3UdhKnpPSUZ2JGK2WG6Vn9KthF05n/q6d98sLy9cn

pR74N4ydjHYX5XxYElZqxITQRa8XQ8gDJRrTUuTjMzJAbUwt5+qaHER6Quyrf3S0UehyZEYPNlMxiyJT

ozdKMAewxqJ/5wPzs/7DqwzzRi5L56oJ9LLEqScypg
AxR4EWe4y5wuOG3T4zgycWgQ1dulj4fTGTUy4wbop5XRbVUIOoy+K+WjE7QIn/BjBjg4EV+7MBbYXcHD

0IdNWTAlI9wDbko7UFWaHZUQ0AVvQQnzoc0dLWnJlSdQn+dDtea65Faki5HFVWOn1nzQ337f/4Rx97kk

LRs4jt5YD3mNRzKO7QvL7w9D5D3L8NlFlIg7iY/5Zz
5j6cPWXHSBkZidLehbKv1Qpmwc0RqJAz+O8ZuU3kbAULCjoCS9IGfISYW55V0f9WCyNKqwPFke7dA7Hm

AUX3950cL2Wz5Gl332mYjMF6DAfJsR7CZChVXdD9BGDd66X9EA5lDdVG5KaKhS3uGCOzlEz4H5cmgTpq

FD7O/VKaTIslM4dN4KlSCd6Rmd9MmBkCCe/SPIskoT
c1mZcWOago/5GAvmrM+XoRRexymNdj4zDW76y7dv1DKC7q5o+7d/WZBdaT14zh4b6k36u9aAEjq4Z1W3

Wy/q/jvonz07/mvPgomAFzg/hyiG3QzOtIWoyXfP090IJuMJj91bnXYrJou7cuNuxxXXwRH8N7Ky+YAH

NceCznPXlts6v30fwORdkX7cLnsjfe62ntfjo0tYgw
0wBKUjn/VpHit8YeP5gs20eEenqiAheiW6y48YoDqajxwdW4CT1cmWinK/5s4DvqzT2mcpCiX5uBRefr

caO9Z0K9mY2Z2Tsg22B0Rw4ocAOcCeOSOcCtUKfliJjp+bsFqjGAdN7qFtuglkJ/2WJy16jzziTOw/Dr

26Q4EFYl3D6v5KYLPCAsWg7GgjYRuX8cXNNdJj7JkV
LBRMHW/cZSD+KwN++MIRUTFbbxkJf2gEt4Xf09tI+0Sz/OqnZXkSDhRaWlr+Xcneiuf+4u6Mc3G1uE88

5T10zxkGghVGgb5/oqYRPv6TRJmuT0bt5pFQIetpbzys8U7SqC4SCDNqRNjANCTwPzu7T+2Negd3DZRU

mkRXpt2VLlOeEbzG5A6pPRCRwYWXabWQoPDygqu6DB
LBz5lxKp4322f0FxVCGOpRMeaWBDwCnw5dvUOOSlqs9dEt0tnRdjXc3ObqPsaynC9YgPGxhT4pww92n3

BISidrIdNpRrBQGPRZFJcNs2MUl+oOgQdQP8K1yjlR5E3Yz3MJkkH3YO1hTmYA6YkZK+UlCZ3DfCkO9D

1mL+39G1n4B1gC0qX6bxraL3AePuECUKNc8W+QEaYl
IagsSSrT65tA+LzmNJIoXSqg7degBQp77jWhn6G99EGwrbTeVLdF6LTbhmrX/jadV0qb79bB6l5slEI2

1+ffFrm2AsJufzgDSAyIblzC9XfoWT/vAIKamxNpNqV41i7j83mSxfhbraiMjrWyfzcRAyRbEbV+set2

RrOUdgAG24jy9Y0600r0FwVnCPY+DfwIQWDp9Srw80
RUiS39cQRDJEqertr80Fh4tO1seym0Fkm749pRTubHzFGD2M7CacQCXbyHSdhznzFG63fZcSiNMXgv/M

BDs9pA0I1EajfiSJd+HwuYfZHKf2aaYx1uPey9g1doHjLA4fki51VlmmVNEiLRDmY+kLkNku3aPHIl5a

kB0Wc/HEugK5yiH/vwo1mxtfyYNYjucAUJhB8ZN5b+
xFLWh6CqhF+EW+8f3m1VdFN0WtIA0Jy0zZoBj5DUhl0WViXUHmW8Rj1XRJKe09wrRa7UOLVs6JQk1cdC

SZ29sJm13SjFefvZqpGoJ9ygKJ+v0+7A8Db4yqdF+Lowell+v1nD81H8PaQA3yj1gPs7E7LLF7pDWI0xT2w

5rlzDqqSut3gn4aio+xWBuWawfN6A9o86pV6GaXofO
2e7t4C7vW/n6biIWuqBg5R6Xj1NOv6rJWGuHh1saCFvzE/aVjcnBseoOJyytaa+pz0dvlzwfVxnWIGq7

iKczAOmHWELNRsaEqNKGm661w/qi8jJgiuhB3cYH2wOgCGf8ESU4M3su/GHXFUNjuaG/zlTRegAyi46V

lmDBm8DsonsRnnaMEYxpOWS623pRrsqT2cV+XrW2Ub
iFK/R3VsoSC3+XLRT9oIN5gJOqq0Ib7OI08EZT62wWimbmFlXAGd/GArnU4pcPRd2o8H88pyU+pwwHht

jRXYul8PU33aD9IGfZd1gj9uCTLUCyhq8qknTYws8sojq4bcPwvt5WwS1OC5/kkyHkKykcgwLG1x8L0S

+A6PpzYljD6+ESIJp5tFYlQDRyRuwnTfgzjnMJ0xOR
YGTDsguLGrmA7bXuuQjwnpqTbIgCe4MzF4J8Sd3kxvtElgdJXW0NywJxAdzOMst6Eaj6chmGkh9xuOtb

Qle/oPsdF0r4mo2cvzMQzbnYkZsHWurmGomqdjTz5BwoSIhJBmTd1Eaz/WTIKT+Eun82reX0fAeAJWlo

1coXu0qWGkjYf6vrp2iZOKiVetZlIoGvDb3DkTPTHq
gGvQoZmOV/bj6uDLKIt7X4jb6GUF5B6g2zEdgAd1EFfX3mHUF2o7KEW3BYoU9BehMlKQUN5f0lft9pXG

M1KH8JWs4MCEJoEdL3pJ1s7tUcdvrKvbof/sDvZRkOB7M0dBlu+8SJ6012OdlBXaPnL6UP4PXQvidLWv

Zj24qos4nwf7h/QmIb0fCrUfyz1tVutRCBLhj1ICg3
sZVyLF9Ipg7IVRSihAZK6uugSzJcQHI+8IZ4jf3TAgcsS4s47uk+kmzTVi4L0XtoY6W9XAOCcU/Oh4rf

WMySkunzRWgbS9v3+FsVyJU61R1NCv6sNQ+W/sYBLbwbof5RyeJQY3ULxwAhrG7ROWsdl0zd32FzfF0G

RwFZRp/6H3xx/NBgK0Rc3A+O1ZBkL1KV2BoSxP87Bf
WmOls6KnDp5/cVG165Qkjjtl3XiK8bJ9a3iaMUUefy1M0N31/G6O8lhoyQaIg1pEV8EI6CwzKFkLQifN

c6XpUGXMnOK2SY+cIK4iFTUk/6S1c/p9irU/2ESI+T2gK4yQ+93J8abFNFDG+gcsuqYqTOqbEnhuBbGj

f/pgRYQ9vCgrotVvLGH/GeOXbcqMAcmx/5fuh7JdpP
TU0DNsQAF2pcy/bSvmtYrgMBJYa9fHBV8YAHFZqxbCG90cDOVB+8QSkTDq0OyhMXC96POqNKORrxPyPn

ZWCBMNOOgqjXoeoY34/a3qnhZ37Ywj4XpmbzKrUuxINE7EIut6125Trxm3sW+XHg+Jxk9vEPAaAJUGsr

tzayoBjcWPBjmTwLabN0Mv4lcc8EqRhc5kLWvDftJ+
ltTW5u/fXsDjJti9SM+QpngwPIRqQ0q2q/CvGApqwOLXlsnM8oBcbt5Otgi73iKrfDuQykAVGdMbwCrT

nY/qjm9+mmvalW3TwXzhoXisQpQmYY02HBlxERvLq5Mmzjcv3o17vkwx+KQFdrTBe9kcKZruK7mqKlGo

Zx2Csr0lATXORZk9PBy7XpWYCuKAvxfjC6UY3o+M8R
74Y1ccH4bzXB8HrN/1uyngEpxNf666rQ+TyTbCVObLZ4RZea8tL5nw+CCnk7rExYOMKwzSQYZgSXNsY1

QY9rP1vM9lQKgHurmvofY7qAOZE70zcXDIrbcf1bpaH0FJu/Gr1M1cs8fsR/pfTAILxiZuV3/Drrg7p1

ziy2KQRbuXGCdFp7MzjiMakObP99ZaXjmZy3XYnPeF
bkSFeVJRchn9S3r8KpJwcgmRhV85yLqEhQEngiGEPRalOyvqYuMjNhAEw7kCdlcscasyukh3j8L2Mkjv

7totsiY3AHtT/2IwNZ7kYmkYvl+zO8b99TY9pLGa7+/kDJ13Oz4Fe6hHMtH/t4dsgf/5Q/ZVLNLzlnhE

59O9YIwJ5veszIqy43/IFrnJnBXzjVWzZVlLoCJzxR
4GM4ZBkNRRecFYUT08bFZClh38JfPMkzgqCcC7rwtKnSAnVuVBZ3bKN/sQhid3rogYA3D0maXoqP+ZN3

yGvKTmVp5d/hEGo6SgQYlo8wXUeLt/b0uSov+rRr+yvClVuKu30PUvVZ4WRE141nZC5b0rKy87loxmr0

/IiYSmtnLTsoEVlysuVVFFdh0+CfLskC9/UgTsAq0Y
NitcCXkt/pwTmK6ufITzqtjxlc1pjjEvCHlPMUOYCc9UN0UlwjphlPvyDH+QmA7sLlzZKy1eeLYlLyvD

sOGXPvit5+D46e1h1hHxh51sTDPioG1TXQGwJPSAyBZ172W/4lAQp008OkXfstwf/HSv+VTD2XoFn7eo

pJjyDhPkCev96w5KVDSvhITVHIKNK48ZKdbMfFcWcS
UgEdhvLoOb9fJjv33+r+2tsxzyIXjj08DFLsa5PwH07+Bl72j3uv7gmxPcUbpw9RbvHcSjEC7+3ZAFMq

GXC3vpAz0LjAvwOTs9Pbs2PL2FWge653+MWPUwGiZ1H99sUQZhE997uzJhGz3NRwYyxn8sqXta/PBuX3

p45fV467Fjjgbarjy1ZQ8NR+66/DkKZeTIbS2I6AXw
9HgjlsKzCPNiG9BG3hxyZZCMc2X7eUZJvSs2atpPsoyGwd+JDaX2NKX0jEhPzY8gPUrZ7CR1V/ho2DsB

KleS975FOfm6RthQRWRF2UQYAWRr73mAMbD8ldfLfF+lrCkUF6BHnwA7iTWgvOQdhqUiPqytJNomDGdp

ggxVRL5LSdbihkCDXn7iLaowcjEXAMzgVaXiD4dh/O
E8W/b1mmdgbHPz1CqJaqy+8fHTz0sTmSnnL20vvwDT9FGASF8xqCWC2g49+QH58DaZ+TgE0bAU7c41bD

Zox7kcN0WCJvzfrb+qFMvaRYRLfPmRdwTLSgBR41+HUu80Vy4jgGKjrKwInPTm2J1UkQJ7gfRushgtzs

UdjNiqKH7vk2c0EwncAV0X2h73lcGGXLXug4gr9kmb
aBjtK7DzyOp9MlpnqLqc3COlKOKl0ACM7w6Op0oA3tTgR+xTVEetS+2SRSjGBseuNb7mkTodEuq6cwAK

gbbaUSwHPBdN5s0p94RfK8S4I3ABBTHH3gdhOTeo8TkK81F4/Nr+MP/7y1fPl8Ykf/WdjySbf/tvhYHr

0y9G52pzGyP5OfRLVJwraJe0fa4pTs2MZZcL/TtWU/
eqKELGnCM707FHbptvP5QvX0n+A8iIaib4pniZqKuc4LlTJB1uVOiItoVEW/0DujG//EJ5et7eASCAwv

zLYcAU7+u4Wn6Z3tC1S+vYeZp+Ebz7xyLCqO54QkNYvWMTLBFEXr2eWkzh/eqb8ERirMC6jRSrnA7ONy

LxSfz3YgU3ikHO0V78JsUY9cn+r/GwopocDfwYvSyH
FyoFdxPUQLjLcgGABOnbAk1QsVQJpKx88raYa3mWAx+YvpcI2GJ8dGYJXNSmJrzC4vVbS/vdeaOUjfxW

fc0dcurQkHEx+nPJlXpBlu9QEi3u63p/85SgFf/n73qNh0C2sU40ggj/un7sf5a/DZrWfrQ54uKcSzHw

GeNZXVM/P7YDmwOMJPImTh6bOckIeyNfImVz109l00
[place]6YMgl7Er5Vhpo2I58vLUk8wlonzozprfoKaxkNAdljQpzjSjQkQAafiOVuVCW96Yg6YWOoW6Ij3OOh

97wACrMxqWghRsObqbCLPrShKUt/0MmDhCx/qsUpXZb5CKM63XBRX1iFr68Cj6BWQN/NI6t9UIvEG+9e

/r/Iyu8W532T56Zu/prmdWHP+7R6XPCeY1sURLJ8OE
P8Tptpbw0CPq5aaXHeB4Gx6DLAfPT3HGo4DybGmTz+YQD+ZOPAU/mj/NJTmuHCX1LtcHnYWcD33gxB3J

0meaAjyS5+KBal1zo8zJEDzHDnJNp635U5HWUjSJE9L9KppZyc3Loq1y+wrH96xGJd7+Ij5wQlhmQLZz

e3Zum6arEvvVRAbDzv84O1LFHncAMW9TMWpAMf/3Po
An9voibuaYvUP7ON1xdOHvIO2nCIU5tavBFkFQtP1wh6EBsReQf1dtz1uNW/n7xQTcdq9pJl7QVJQO/j

ZZrJ/ZTbrbMMwPObeKmR2XbPE3Mb7Dby2V1YEUOzgBIYROXHjqJR/tcX9s4rFwlsx7Z0biyJaDHWbqDV

czBfpsWo7SCmzJ1M+whgqmThUVw2sJdZ1BB1fZR8pp
YKMtYo80Exttg1Efq//4Ekh+zsAHb3geHy2PgqdsKz6ezNuvgO8TEeyfVLtOlgUnLglcyPOjuqjySeRn

zbRBuPuBvR/hFUZhnT05rah8EyiXm4ObNPHwZ3EV/YBH8ASMXDsKVGEWw88SmVwg/3ojkUEigeClyOiR

3OFcl+v2GKpZommh+YDfvnJfkAruE/v056iH0gw7j2
jsRomP1HL8FfhA+lyhR8NT53y0DmrdkvpBcHS+f/Zg3DNfkNo55y0wIiZRX2wJVjpGrydi7YYXbmB6QI

IcgJNCNNk7DXZ3Lru73//nt6anJN88sUHKM6nxJPzHWoM51G4TkaEktqGQrmDkD6UQ1TYDJ7M5IcYx1/

8i8eEVCI/ITrXEYZZsr447FlvFxdx5sxVzLTarjAEI
CSpzfLnw7hEIGXzbsek3hBw75H3LtJvbgzRv1pvmjts6v1N57d9wAJApPLdkggrtMRGHPQWWmrVrl1l/

sKyK0JxiMU5M3HFZtrrb+VF0VkP1JYc9RjozNBFsRVv+TDrKqT+ydWm+hkE7LHDSqpSfzgwHkuo7MWQi

MdNlUg9+HJuEZQkPvay8yIJoNVrCv/EYJlMBgdzgkJ
tcQIXhfljQHMngENRU6H4p8Ph0SVfRH+9h07Ud3FE2sOr25GaMmbXLsLEVFupf2C3SGsVc1Yu03oIWkg

d+U3PfYZJYyBxyhID+qqfCdU185fORNaOht3U/9CC9PyfzlaeDIo2MpAeCyy42qu+9+T1go6FlD+YbPv

Oi5/m+RzY718HOr4VaTG9ls8ByNaHaBWissEQJ8y+9
OKGSNtBApJ4KDwS8ybVz29Qp07WH2WTIsrWenhScieFA7dTOvh2wwIzpQAnLzowegf1lJsRZ4fGT491/

yLV3Jw5bDQhgEhgzbFzOag0rrnFsdg3Cr8Lw/tzIfnEYYhjRLhY6eM5t+wilakxFHmciFR3wz4mH7iID

eba8JFVR5fpEDh4JXjnGl/EVJRxe7ZiyWcNqslQUAU
oh1asqLe8f7IkiqJoYsoFOGDQ3jWTB3DkipC0IQPWBz0oMbcl+kbKHvSs+PaNfmMiex+sL4U0RYxsJZ8

7qfkjrkHGtEMT/0EHE85DD095r5eB6x87kqqTHcsV0wqTYv/djvIGzCA3R8jfBfEz9pa3L5MnWN8obBX

zPbZKDP+FkQu7L8NV5LcF2XP9WVvPWZbKZ7sXnevt2
PvPOom9/96W679Txifo4leDjqH1TIIuj6jbHT/fEi0PB50AX2zuHNwADnssLuT9Bmx8mE4ofSuGKrHYP

QixZO5ur0AeraFKaKwvZV5xK8iofZY6d9+DLuUbHWKthPRT5yHEBAQaE3ub0mKAsxh1vbJ3Ev2JmDBpo

GOQB6jC5hudxjz5ecJiBAa1LwaS/8f4r58+xXMqKSM
IsujVbcXa8AnlAE12GOQRCp2SobD7OCvcpZmcacfgfQf6n8ETrqkXeX9P+jlTmatPm3cIq7Q45NXbC5N

uh0UYOlPDBNsqpQuo2kzwghDR3kRvvfs53D8lx7iISJwKbzyRRpN0OKGWdw82+zo4exfQkYqFssezh9r

yxcmPBfP+L658h5QRMbX/u6r/jWPsgYXZBqc4FvdRD
YTb+8bmAg4p+XDPF3lRjeiBRuo+rbIb67DHcjjokNj8pBbTRkTpJ3y1Owpb6ddH1+0O2vtFh1LJDnADf

KEkUB3jf4C81aaiZovhklxrX71lH0N2p+90jBPhMaOYZRQdpjT9AQrJKFJvpWlB7jeI3clUtK6u0W1Hq

wbFlO1Jm5jqWpZPR9jtBBBSiU85MMZg3SPLJvlO2c4
pdPPyOLg0lanTO0uwZErtP2RsNU0dLJm6wUHGpqS30Vaci74MjbxKXZhb3einL8ncSMsw0m46fsiEVo3

dn7fUFUKtmQ5iCAJj6NYmAAjNXIxEe4H6d+PQPF+1wcQovuMwimg8yvwK4fNC4Sx9UN+96j1PQjPh2Df

HGCc0+B7u0RvLx4rYgZk9lzZNr6WmD6Bt0d4+xb1e6
hOjLiymZgHQY7lkDgcwgsy3fcqJjE+JOdlT0ewTy4QsB+EaQ1kULb7IxYOBA05z9Ra0zLjgIgGsQw8+k

Ct6ngPepUWzIsRsaUePwxP826JWzSnBKVQiPWyAXJpOa/C/I9ATkp8onb62ym3nJOgjK5omiBN3SCqA8

yHNMTAxWQ7G1sydyCs3HWlPubivgpQxmPPYLHcT182
YdJNLQngRc4/dB7vrsC0hQCPufIPfTCzTUu8pqeptbnFBCa7F59MCHp9EBqJ0lPLNh2QGCKUEgD9JTMM

keSEQTsf8D1LnzocjzuNMyaJaR5kcwAdZ4T+3P8xryL9xAXBvn71/dh+Tm7Zr8HkrGG+URH0s8bD/Yeq

dtIGXZfVqOG1/jywFXOCxelk29B8G5NETPSZl4J4dC
mzzhMj7pzF99sEx0PuAIhscH4O4CVZ8Z+I37oxPy7TAJZ0DEb5vrdQ1Oa3+YI0FlgUNUZurM7i7MVGGq

X7UX//V15rYpVNu7zMeDl/SuEhscrY6Kj8zPdbhGyJtAza2+MHFQrDMvoWVlc53wvV6bWNH29Qcvz4Yt

sVt0yRkxQoWKh8+hbJ73j55fE9K+7yebbgThxWzmfy
JiTu5289RjqgD0t6wPtyE1BHDOWVDrhiD89w4ZKdUHF2IPnlMY3l8HkN2zAyJmJYTTz5OXRcxiwTlTSx

vOgdkhbjkA1njOsKCPauIy+XCaGmtZ0YxQP7M2JhtZdpjOHbusDi8RruWH4lDtH6YzcHNc2veVO8PCfg

SKj8fDSffEwo2Pl5luKy2LbyQZ5TptFxYoH2CBG8Dz
rBBMJnvBx7u6i2XgTJdxKxLz4YEDetyS9JtKGKn8F5dsL7HKPzuB0ylRWp/UiFiRf4wM96rdCWykr3cb

8MkRCawfwYIG3LJgBCzvksgLxisq4nVkjBX8WH0I/jiEQuB9nQi+ciYazG0zAfGxUUigGwyx+mEj2sHp

HiqRbVM1zLVofP0RA8F6JjasZH9mzFpoWeZnrl+Qnf
ICZCHwkAwKehi3iEGr4ER+wPOmL91D5blnLN56qEPJarxBY5/ZeOvY5cvmD7uioWMyN08Jr6fQyw6MW0

q+bKpxhyY8Qp2/rt5XLtjgiSsumsBWbucBWP5mOdDm6omeh9By2xgZKczcuq2nDVNMDhl7Ibs0BnGMkB

gpD0FSs1OTzUWZsklJjyNlGWvRWtES2uPRGrHTNWA/
xMfa19evuVI2zjbz3D6tALhcpY8vikguDniTNCSGxa8FKl26jKyu2vUYEOuGPKo2WGMow1O6deeLerle

RPZfkh3QeyoY4wW0QRDdqnhdBj7qXqHT8ddBLKoV94eM2d2xEjCZjWcQSSkTe95EFAJunwmIqi7Njzea

WGD6DJbbfN6G9VXCJIWLx1rLdTHyO5nTBZo/nLRrfJ
RAAnEwwCjB4afjl6uhMZrHSNSpWX/cOSMWt/zeznfIM6i3bVEy3RUQJqYuEDy0TNz9/zloMPIeQBGghB

iGunq6XojUlE1tv+QvHhU+Aq10wJyLo5unRhpPt5v5R6SrsCLVTFFELEyfYghXcrl2Vrj9cXRnrOGDgc

R0Gfrw1+Gl858Ior3PmN5WNcwoYr6goIjJAiigmMzg
zHGsmdLVndJ9fS+LFpNs3Q8Mx2gTER6vppWDZvBMtqgVqxcxUaPd8sKnU5KG9E2Te9bgjljOsimTv2jY

NT6ISs+o6C26hJqxODHoKUgt55Skn1XRFAPqxVqacV+XdL4G4x1Q0Cf2xaW4k4pz28xJOn1g+lnL1brg

XRoEJ+bioFwxU4hxvN0bqp9BVqV0G/tOD3iUGecRRq
60j8dqiX6sXrcly2wx7gy4tfhBLyk2oGLiYtfrOHrB53R7igys1ZDWTy/PRjUTU1su/4bkLgeK06TPXT

I63QIs+3QJ3bj4pQEoFvX2Y0oj7B+HYv+IIMAz1JYFRF0gW2T9K8bGK7tJg8ZHMpbQBEZ1QVAwPQslBD

5SJwT+YREsfSc15ArGj9TlljXsnmdRsSbWryxIFTV5
6Ueml3GPGGJoIz0A52XCshua2+fYxXNUxDwntaOJgKXscb6isQ6w8SyM7vKlV56jPBZtzpqZhm2WpwXP

MM8fntw/kbbnHaBAb6uA6nAcooLbyoXNyQTw0+DVb1gQ/doIJiyz5NQ1FfiQ9m48CAgks9gNSNAPSVhG

SzG9B09I5js5NDOMy2ecJjNcQ6piy3x9R8dwc3/NEx
ww27FzdVNXRwn3Kmmb+Ycog+DWg7nG9zOgGNttdrJXz0I00PRnWhlejAQwB0RmWHwamRefTal929C0Ns

ouFt9qoXVCwx8tm+I1YYPNE42e5FlMEmSmCsdLhEtpgxK4sGoAgOaCDUCJrEfyEeZ440dY501ZbGZbDK

BeObJ2KQUj8xjaYJ4Ot1f4KQyRxfXoVMzFu+CxaCri
NhCjxHZj0PAzriHqXyaeNETT6PijUm6xdJz5+qZfbrd8IfcNRyGwFOOB0q0FFjwK9mQJMI3q7G7l8wfK

6V9T2alrnrZojR960cQhyMBHijZoHC2nPNTeTP8njLcKVtVkJWAHNtykRVQ65IOrN+f0nBRJCsqKdotd

PcAbv3tfQUWNoSedAMNp6IYbPexbrAKkMWdr++wUjU
cQsMB5y43LpbklWvaDiGa8rPD2u6cTftQZhruTF18fwqdRPTTd/qQemnFaswLaOzOmR+dB1q4LPjIuVM

9u1ngx7armT4H2r7nHy2X2lU0kFOxKpvc2+d1e/AcoMfMdHGKfvBAqRh9aGXykAnHroi6mnn4/2veP1T

u4ILcc30J3fPTrwUAhvbkc4toihdt6k8aqaz/13BIH
q076w90qbr12rcw8hWilXb+H9191gk+mxabOk5ri21JcBTcOFRSOC/uqvU0G4LeRHy32K84fIf7op1+j

jc3FKg7NkZUVOSwE9PwTNevf8WZjdl1Hqxaw2aSo9t2T6PhKtzncxdwXW9GKpHdHB0kdEB0F39h9wu1n

1jiSqKqXuVUX+N2Un+yDDgRBk3BdZOMlfQvWUORBqT
aApPLPkFnhfur8VIdgZNJRUth/GsOx68bft5DH2ls24+8RLlpJ/kwfOgk9cNGWwU+KqlE9cSzpYflnap

1bdfLi5GVKSf5DT2hOePpyHkeOMvZ8jUHN175G9JcFBfRG5p0VPvFbEhUVyd85/XHdF4wKKNt8OCiqcW

FHgqNnwzlfeBo2One6e8WPepOlKWEKhrB6mcR08SN9
27twJZ16oCRz0Tok1S4kAKiLvyeGCSCXwNbt0YcmQW8phfUU0igbcYxaKaHoxLjNY9L0HM1aGM72bRLv

fBaWBmfK670PSgsjb9UXUZQH8uHOvyMSocVadWrYeCcu94lKrcZYB3D4RpNhf6VyG4BcJZiM28wHE5ml

7EUxub4wx2H2u7iOyeWKc1s0mri31Opnljirw+NSXl
VBT+88dBcJfy4COJEV52gTyvW13H7eC2MGB9GA5/eMJG8htZjhFpEo/XC5cXduX56YDKLzKih+DP5Ya9

Fchj7EULSBSt2ykLKj7OkDkEwbFfiKF7ZJBlvJ67Gy40qSOYSsJleKmhbzAPvHgcC0NULZCPLxoaiqMA

Ju+qHdZUYC7gcCHnyko7JcRnNecXZJUzY626h1wv1n
do+3X0s+yp59b6EzO9/QZ2aF9OuNySFbre/tpqTwoE517Q14zC0iB0iAp3cW/B62MfoP4Y2bZmq0glpi

vqqhfFn3qF8RxkigiagWLpnnYOMod+zuq8q8Nwk58Y+81q8l9RaatVDwZ09hZn6BqN+qamBwMVGjL7tD

9JIKY4zL++WmfYNCt08PQtmrnQegNdZAU61mvQACrn
Et8WAMHrexnj1VzkZY80+DHfHNcbdeUPnptUzqB4xvPVsyK/wHyTZT3pBl8W1do8Zc8HFCNUXVHgUo+c

ebS9V5GpBNRqwSl9T64zdnUFTXB6tkd4q5lmErnL3NoGrszHXT1J3v+7Hw1qermsIuURI64ikGlBhfbm

jr35/VQTfy+SIXefDpmcnI/ZBOXSC5+iRZW+pN1du8
IgNX23V9Xuj6U+SfEzdNVogRII1XZPdTRtgDNNUXAuPH+8XFtJ9picrqLkpoYrIJXeck7LlrNl+Ex/+D

k/cKa/DOsd2YhKiUrPwuzuDgJHAvWuyXH3ZNeVyGMtODwCFDMyF0PFEbVedEeQPBRFfw2Mag/uecmWdm

iktMdXj5bL/2/tZe6/1+7/lOnO0u0P32aKeElfxqaM
20CrG0qGXwro0EzJTsuvvUZWe+ATGhUUqNqsQAW3dXa9OHSP2n3Dfud0gLPKFkBt8HJE+CEDABykFSPf

0JUYSyFAuTwQyHbmNOf9Wsq5e1ithzDhX7SmGXcE2CZOtvtNlP/OU2IuPMPvqMLfEoIFoH5oHNGsmixC

/ylf4I0X67zYVw0IwmzrMwv52y/+ayN/brmyOpJl4T
f3tn/r8FBg9t+oJeay/1qKziYyrwVYuVGI41TQ37HqScWRsIQRD+CBK0jluWLLYEL8hm0uiPaZTO1wCs

OUdqt+tIgXqiMAcY3t1Ql5T/+Pbh8+xf9JESLlgM3osNGYXHa3b8+xJgyY7kdRkUR4TnhN9z6qwEWtRz

jr3FBKjjXujYT1H+fTVZaIeR//m1RiGoyk5xFwlF31
eMvgCklTuHhcYMDGXI+7orB2ebrcoleo/YgXRZrx3dX+yUxlFo9jEuSGrfqKItkuTu51jlB/1+82hYVS

JGGUqRLE70nEiekQ0cM+73Imb8op0LyZxYnhw2j7dneslYK8Wcz6SSyjcwDRqzuNoZx0WV2nxBd2iR/t

ZIOWtPa1jGW5DVtwl+1fa5c44cbuMQ14arYoGCIys/
J/L3JRPLakWOVYxadshIkPEoWivvIsr5ifFrh83BhSpePf+dxW4Pxg08f5UwCvoipjH4OtUwXN8zS0K/

f8gW2TGCfCdSVbhuhLGN1F5Kk1XtsE1lXu+m0ej+rhPdMklpakbp3kZ9L9Wc2YDwzlwCh2+bKaoOfBv+

LOcUfOPDrPNoozfbPQazMQQmIAvphUD8pDk7z2rgXk
zpM5Pld2Wgh/aoZ8y8N3wLwb93ShelHFQSbehkRIQRx7qyUT1K9QgeqQoslyaGHr0bIQNP6MW97HpqBk

QGDzmuP8IeHw/eWIhZ7RvhBQhh/y7I9cambgEVGUkcc12gSNICiKABsrWIQsY7J9LOpN4Bu7j8C/7rVx

jhtAxjBLvUOiX64pZxcdJfQCs/Or+sle/c3WVhbYX6
/gltLd/nlYg5LxUkBK+r6wyttZPAiQHGq09YIvw0anqSvZTdsVrrcO3A5gOql/2hOb8G+IArfN6cxuuc

zrXsfCTZH7FlqvRjXduc36dVL7FTSLfoSMnxPhvM06DM8UsggdV579MYfXGgthIZ8CsTebaAf49Gc0z/

9lch5eZSdEf/JPIHSq+w9V5h3uc3l+JRqjJqVmcCcI
SThNdtLNFdkF6DCv75hgXwFvA+8EYTafNGPooxTvhFNeDQ/aw2qv8nB9brc8mTO4XemwCB/+Y03zDsC1

wM96CSzk38+iOQBmx5HdE7Azt2fbzKfp+gH2NBRbOawperCcE0EQC+J64v5kp/YCVK9rX8HnEckAV7Rr

2YIW8M4r6Ix/GyX+FHOsPrzlWS/xfXlvz/6NrcgqSx
1CUfffLAwa/trWZOf/atyayx9uX/Ty3SXEVZh5xy7Ih0nzVAv2y1ufSjsg3/dHjWDLQgU6utQ05ZJOJS

Ee/9FFZSpzILOkXtdEFqQm1QmvUP97D1WOL/8SU7B+UWypZwXe8z49l7OTkUwk2GMs2ieTqjWRn+VwQl

ZD2yC/dBWP5jz09TxTnOimYSUGV97hS8j9rB+rR394
QkDVhhOI/+VRiwkGoGZIUQevUXVYEqXdxDwZxuCDoaEv2UYOX7qYiwgd7D8SXFqHDIruItC0VkwJWoRx

deR0cRyt7hvxa3KIkFM9Y+T53KEMbH0zatEsA9VYSDPUxx5cXO1OcrQDss+m7tvv8oDGabZ8qRo8nL58

XEQYegC7NI3Xtv+FrdwDCqvv9iwLVjUNX7utTLXvCO
F/bV21GzDRSdCMU1hjZN6nU/ZF7F8CgKvLeRhL9+mt1weDYmTMjy4E9TgYdl9r+uoeaVeWfyloQxYY0L

1H9mvWHk+kIbbZlhefkLFJVa3g1Es+Dds4hR97Ofhxa9gs1P+/22RbgRmiB1ghETsE1la4EM5dbSn6tv

b1xeqx6TzXBTcLj9LK1rIwChBFGj+xy6aFCT3dZHJP
ZoII/M6f5SFWcHa/wMPf0FgH2jK+pxwFtMWoBo3WMtQPn7HUR+q6BWaJjlBCa4XJitLCQ+45Vjzr7o6q

/0jxQqWSOwaKVmEXKgJOPb5D7s2fmMDZ29ay/iJMD116hl56xqG2tjje+PzRj9FMeUUE5V4yNgPVtQil

+bAI1rFdDv6BJ0rgIwOhy8Uzp+TGsPFPcUT6aP9stn
9y6MzdR3hq/HJVrHPZWa187XWG4BH8lKaUsnBn489jc9wm535N/0tId8LYDDVRfgaR1kl+LIrf/BE38C

pPHjrWjIaqQ6kkh/Y25mu925j14KI6EVTKxvA1lrk3aTOYAIHKVT6Tprx9cWczgB/Xb1Fgh6TW4CEM2P

H2HrtT6xKV+ktKdXzvtoZ/Cyt4PFnJh2YLZYrUGxjd
rBQJz/7Z1Uzkb5prKTsaDNoYg9vXAiwSKv9aoKnvjkXB3Z9uDa/qm4Mna14/99nL4h5UxnPyu4knQgiQ

L/H73rlFvhFEMgvkGAWrTbIah1hx+Ovj/mm1hcsIJJ+Na0zdBUHWaTADr/WsmikrJqSsbYDevTV+1wwx

0xtvmpwZcTJMlOSnc/Q5ZIvch5Wbakgy+Gme2IVbBj
KE/u4yUYsD+m9jDC7Y2ldBbQ/AZlcmYXO5izfDFTDhzbPmgqxIv/fG3QY2kOJlI2gRQUCaeY4VO5a1Si

NWiPuCZKq6TS+Rtor3GimiY7Nqk21/efI4lZFJaNNaqle0k0j+GhMHQ0EwuT6dnDk1tZmNkOvBXOzta+

oBjLwtvoyGJ5x7EvCsOXdNtK+lEkhe89e/R+Q7bptE
tG/2RANWOQlfrv/NLVDw/R13n7afpfEK1xkR0UwjTwtIPUd2ygrnw+ptxPBgc5ehL6CVtQ1yQM7kOy5m

TK0VhybLfeExTO49P0rJ1l+LJzqHn9pPXbEw6x7aGm1qEjAN/Ox6rT9RsQ7uO7tS4PfyRY3wf1Qwo1fQ

Koqiv/UjjjNaf6TiTkh8KBbe/a+AHRdWt7D4dFNVcJ
hUi7yCRGcrJAjnG+RqNK5oxjFhG5CRD9idIeNxTrv9YmPzohnwgSr7D8zT7r5MnTToV7JQ7Lsnav66xj

UwdLaUf8CFN2wv8V2LiCPE/HRyKWbNJQH+JO3qEhzMhHbUpxCWLI9NyPMDqoY52H8yR054BDIK+Dwupc

JQbUyaCRxbpKlEQvcmMA/1/HMU+vsygpfSLSI6y9ep
EJCkTSPLkesUfKtfvJ5fTxxRwTappAZyehFkAlivGFOukmHjJMImp769KaXJFROuD0/8GVLHUJLHvvIC

M0zO42zDBEhzdj9LRpN2n+KwHx+xIHsrUQirOdILt6kpde+mmh8OW3qqZzM401AbXp+rEdfoK2Gzr+eE

9Bttx73rItAodL5u91AlsmR8uH7ZE6xrWGK/B/B4ij
IV529MeW8DIp1UtybB6XjREgJPB3T0+H4bMyMofmp7/xZir2WtL/sxXUSud5ngiKE2QIUXb21L717Op3

5URQTQCpUPIghAG2C0noZnL2ehSSe0O1hbIh7zTzu1BF2R5oLSUR9fw7FbLQjTFQiRrVfxQ2JDAvA5gU

z29kWZbWxPOkmAn8aorL0KshFIEgLp35WolSytcnWq
FG7tSrotY0FzlPBhpXsTY0FQTC36IryXm41yhb3o5F19XUk42Jc/5Qv7bE+XXZf2dlWymKKrj5DUgGub

I03nG70sfufk20ivwrPbOpn1V0MRkyQZ9RSF9nB8jnaraoE/0+giw4l3skGpycliHfC6vfeWEQtY2Rqt

Y20ccdzE5g4Y+3fdg0J2+Qzv7d29aA8wUnluyGNoNb
fip4og43JLTsMaE/m7bpwCRhDnpjrEYyGq/08WmVzdtI71PhO0Eu4uYvLuVtSE7jd+nihBIGgK7aA8HA

2J2vJHudCwc514bKVF0caYuxP01RoKr0wsKv2QCkSI/dy2ZFvppOY+H8buvz64V4S4zAqccxDdwWtMHm

Gc6KSAyLEMtOB4qW8JAoP/VdyRNNWGpgJA7DCL7rw7
5udJZAUY6nI+b4icC7P+9NF7J5cu4zuoRxgX8KAaZzM4c8Lg5ytYBQ7xFwr5L2BRyXzQa5KQaVkpqyqm

VmMmwmjWmIAZjDMPl1+S6ZPG3zf2+YAC4U7zk/c64ogXaPSjRD/Frb9kfx2PrwPhBS0K+bsrN6gzlVe/

RWG3fODgzScsdergahmRq6QmJjh96ExxMR/0j/0Ay+
kEolRBCVSzum4lSHBjX1xw0/TEPXa+SqQSRKEXuMG5XD5Q8pJvzZhIyul3xxSZoDJeozdga14zsgawMX

ndJolpu9Y9RQBABV5k34NnApcFQhxOlgTFvXkkw8DYYkYewqRlb1KZcO+GtBW1jctSAmvmtj6MdtVmGC

F1R2MQMCi6JAGMot+wk0Yzu1jZnj7vmRLjY5lz+WU9
WvcDGjvQ6y66apIA/qD1mUofJIiFu2zVrQKbdPRXtVue2GTLbj+CPTmKAaCrOQeTs2vQou6dtAdttiYJ

Z2KsQQF62wW0hZnuqRhVTtNldhWHG7h8D4s3jgaZFRShQzcZ4KgYv2LKBy1hRUMNoDppUP1mVd85Sd5x

X3v2pawkDB5S72LNSkgvGNFLqyz1BOABfIxpEZnUR2
eQb56pWBUnTLmO5cd4bHvOwEyOwqwbD2Jn8KWnrQ/2h8afe5/RrlR1uYyYj9W7+YZPcnX/ey4UERIwc8

Nx+B26Ak2yljuau2DeHD+896zTTGh51RkXFHQdjE/WBubetKYf9iXBW1S2vRso8H+lyx1OzvhSu+yYPD

pmOKwl9iQEpABCOjvLtTcfKUPA+3oOzubmb1MVVWV1
9wfp7SSc3VibRaiSRT67/NhOdHapsT/xKn/HzmXw0o5KgCptWWQkHpsnCvMka+dg5vxy9I9NL0i5mR9h

wOMw/Nac/5O4beamI/rCEFO5npL215/vbc2Y38TcRIuh9gynw+xC9GFCAh4cQcBCWFyqkrjvJ+4amu64

ywRsRznsU0QmDtIGJJwNNN4iciHf2Jel2iSixEYgAP
zFDZnA4eLux1m7EU9el8E8LE9fd3opaurK/mfy+/3Q/VIzpbYtq4TPXPOorSlAHNP0fJIjuykBjdyw47

lpnoUTWdWGrhvwX6Ix78oSCWVHNRNBplhJAw6aXzV/NOWn4S/Z4gBd0twie6KwIuTBOPrgGdmHYGhUs6

FkCsL0PbdAyOi6kAQdB/lMPEcv3hGn3ZzRgqbMfFoJ
SlQ7/C9rNj1SEAOa6MEuMnkAJk5AHBgNVF09SDtXT3O3JKoN19h3lzOJdItfengHEyRuRKB5V6d5Q13u

Y/WA5wTw4fZYLUP5DItrIU+geT2y8RsThlrZ3bZZc6wxA2lr5/S+V4l9RE+3opm8IhlPr+INM2jqWBia

P6kR6jpnapHsxGObrscA2ZB/QXRiEvzfH1wGtbQago
dMWpmd2pjXlLzu7xPsQLeqvglH/G/Hlk5Nrr/THV4fIBy7J7l0Pgo7hqZRl+AeQGobhlpLEe/QhiUoNp

PUVjsZRzLoFbvSveH0lFTEECi+KPDlCaeoSMbvn+pLbweGnU2+v9hdA2RcBUxyMfXQ2+zihOrsdgVsau

BKl0lwkIZHN/N2Rp7kjHhOetTcy/lGdR5bO/hovYf4
jqZ9uNvjlvNVCsHC4JCXcp932T8VHsQfg5UleY5YiGr/NUQ4wRmj+u8c4fNuXiJVHOktGi0Wk+/t8unZ

xmeC0HYwxvlkaeJt0S/QIUFx4k+y9SksFwyx9Vzgpz7qqoxa3AcM70pnChSoMUADImF2qFgnD57bntlO

EM6vpvteO+nx+1PY6VO97rEiCTAVpC0RiDtMLJ9oB1
Fk4X1yO0S+PR4HDiEohsjceUYt+u2oSD8drgnp3EMWw6VKsUZwu+vf1jpuz8bx/xD+rxT9ZYX8WM1qsG

Yi7pK5ZnFUs0EBgsne9ajrCic1+javXmn4iiLKxtlopxFRgcGBYVoetXCdmjYZkEcMtCBrPW5+ZgXT9y

mW1kurf8PlSKC0MKT2+5EK2Kw3kXaR1yz/HOc/kvN5
28+Rs6tpsmT1XH42dA13C7PFS6oEn+2Olml550oXb/m+w7N2Ihn2L4eVMawBiPt0UKW8dJd3rhN6dalu

y5rYWAFc8SecxfbOZN8DpTCyj+09lqI2wS2mw6xI79eafZfozQ/hTj38brhtmD4Dy5WiZXkex+oy8269

JzDpBKVsHQC6b8enC6/+htKiAA8rnvqBo2jVK2TiMJ
BucyPf8P0BkC7b2XXrIKOc5QQo6LM7B3moyIhj5agOFlZhZI9iNleLTC992aLsC/isLrTbTtA8BAi1e8

THqhFtLEjux7mvXNat5V7acIwGLbwhjVUM4oaXlJM5+NW9UTpsVZwA785g7XWly+EH9PfXTSXeyWVmLo

xQIX7OHT9e7vINotDtJHV/Gl8otU+hzamTJgiWSeUR
By63cusO8hnLe5qOtzqjQJFIjXRPv5StsVSQlAlkL9cxTctdLt1WBVtYDAfFuv52PGbXNKMyHQigm3KX

ZGyq7B/TPcbD3gdFI0CP5IFkQwx6kuS95Huj5EC24kRQSr1f+C4QABHyiz23R6G5srLTS+OusJa+dGFv

JC0iRxhlwG3ZM/E3xGxVVJaup+y1cJqzgNFexoW2Ec
K7QVK/uYrEjfgrMWbHpWNLdn91vcriDHmOWs/j1JoowZHPfFWnYIDkz2iY+zOKDJo4g3/oE0rfGQTS4+

IyIjMF0v0hIzObgcAamT7EYkxPFijGHa7Lx+j/g9s865OfkvI2eQQ+VBl3L0Y/o7JRILuoeJjSO58Nrd

pAUsn4hdbi3q4+ygweQJkciUPahVUa1K6be8H22V0V
v7I0Cj/GDZvqMfIlIQpbazwjO76qVO+homWRFgCOw/OvygCX5TGxVN+ZdT9ufJp8X042bvoNXXRthwei

qCAsCTggA0S6TDXmCT0bBFQ04WBxCiGBETIqT/YkUVcmPqE/e0c89wBh7qr60w13ICdbNfjG3gL6eaeo

rEuIb+BghXsBwEu8k9n7ZA1Pc8wwpfArvG4wzKFUt7
odxg66MiJHcRYZFQQqXre7zzqOPJDozgQ6rvuMdIRxaPXP9IQhjUSnHG0zcn0VaYnX2xZ02J3Qpqt4F3

873ZdpjRx9uWB7QX6WWNgBPTXv7PwsosidAQWo8wjBVeD29slC35z+CRqM6kjRoH9ebXbPo+gd+l0Abj

7aCgL0SawjFmpeqE8OskEsvnitkJ/M28H1bVFvLV50
coxZSJwgB2oFd4TP4nyCsWKXCrT7ugDAEtKWz5drti2EOli2Pfx0WzpSO7918tb53cGu0b2C/7vzr8Lc

9F8b/D8jWnurpYQ/P5lV29BtMCLAUfzMNcrygzhZGU9oAq+4p43isI2azJFrioiavOqNInfFRTdV2SuM

WftVZpFswEtIr/Nb5rd0dfz/AiZ9+Pqormdi6FGo5k
rB5IFjKza08YZdHfb1iekrDJ/A4JermEnadu/MR9cfu0WDFHZPeZ7ZLuNB5Ix9B25lo/tpzW0i9guZVH

KhWUjiN89IDwtA6ZYYLVe2q9b5GqeXJtF9minScKJXNVRlpNf+Zf9od43tgdWlBZcP7H7CscZLg1LHYd

65cRUZBPYC7VEEmZTY0rFQsDfo3w6zbt+G6D+HAcI+
E5ejtgcjkCDuNdhxISipfzK9rRg6LaLEGqrouGmX/Yj5AJ5ex9ywzUvOOhQitdOmduRj5a/qLvUNg0/u

V1u/D7T6gIctWXNTi/c7BTyDo264+sQrL9keIER3+iVa0YdITAZ0m7sSdicZb40OC/ypdIDbDpkQHCNB

WtUY7PrOFxYQ76XdW2Pbb0Ka6yIkDAxwU34Fpk8P7c
AwoAIV5VJeCysOZO70tnFiFLflFgkRRj8uYipRk/lBLVPgByC5j2PjfHFCbP4vcab1iW2XpVyPZ0BqxC

SjfZveuUv5z+Gart9prYvZ9b9g417Xfz34JCz8xXvi6H77F/4NZFpS95p5+LR8DnlzuBZRcKIR/3SBXm

YKjlSQtqOQtFBlMGA/DjtEeN8k93UoG+xBUGOlLWNG
OQpgeXlK57x9kK0pwA4EI9s/mE2vS5F1sXpr9qhfUh8Qkabvm361rZkPtsfZCWuvX6i76/5BcGbU1u2H

/IJLOqZj27SunatELKycie8fYUY/K6PeUkgonOwit9weluywJExTngUl5ab1d4R91E3h/wwzYt8sfwmn

C3KDFn3WALTxIxR0kz7Lx5NfoFjLDkg13YUn8Nj0d4
Tc/9c20wCeY5xF77ZWQuJ0DeIo2i+aoorKwL1nTmd6EGRXVF02Q5Y5DTXKAUBcrhngWkqvB6B/rfHbnP

Zc8/8r5iNClQRJU1YEZciwlsiKRtA/QfXc58sudKozzGbCFlPmcI1igG3yHDo4UmyRQVkGmgKusA9GYX

g2RkT/KQer3vHCFubeofkoKiA5dZhe9SLoKTefjhB6
oA+vIgtM5GRk8LTh019D/FCsnbIYFPbuWxfTVdnpcUK6McIS6kmbwYA/nbQOVN8VchDgTaIhl6qH2YPX

zMW0+TD7FApZISr5JEl0wYZhZ6ufq2a9X5M9wYqBzqAT5M43OQxd8MkxTVB8PXqnyEr2RA42JYdJoxLB

7oH/uLgZbJwmzeQI9aLoE3iAN8T/I2fw9qA3B1XlnO
N4YBxrwXPPf5Vo3lWa36UpxfJRBqodo8qGxyswm3b3WrTmDsIFrFfjG8no2HRjJdVq1w9Obe88fQ1Iy0

sTkSo6IpPVtjjjvMMPkCi3a85VtSgEwb9q3IL9qOu/k/O32nQLJ8/VkmWmjNZyG6rzCI3npJNiTlhlie

waqpGwlAolbSlZymfPpeZsJk2SW197nvX9HK/7kPaw
Pm3dDxMtmpeeDSpvwbrS42JHn+shzjBOvt1M3SsmcSyPefzxcpKTe7zm+3wYaegOjmom6U2LGhCYZLdb

FGnYaxzbuSSAiBOWeBWApmCM4KgCT/PjZ9IoV+zbcGkmTWSvGALXdLpClE5gdLrtppdWFfwwTmESYU+b

mvtiM2uQ7c/5bIg6H4U2O0aEOfzxk8k2oK1uFwo7ln
hyjK+nte/nZ/zHZcEVuWX036DPpTMiuKeSnNcVnqU2I1wN+xSwB2dOMgFqA/DVkwULFkE4Q+Ib7EpbPb

qIyPDssMGFrM6XBgM/E4fDiW+UzpDATFkdWBxI69g98t8sCG+8Boe2AefXHEdnQB9HN8sN6Ar9NQKNf0

pyfgiqHBbrScz0TyxKsy/2dNmPaSspWBRM6QvUn7Qy
uBkjYGwHzt33HAvUpa2A6I9BaY1fqawK48+rCJj0ubVroBkCpJnoFmq5vzomDukCJMZSWlNfAwJi3Eu+

RB8TDvtisKBgqxjlFDd1jC06vAqJxtuSlUFr6hKPviQlgIiPk90gvAb/XwQDwp6o7IThat761KEafzx+

d/qsIeguUpvNzMMtqi0UmBOM9qvKZooy10EgGrLj1W
hehUaW1iZudKyYHF4iw30PaA6TsLjUoyPxj5i5/y44p8tdVHkO14Ihg3nS0FsZq1tvSfptm+wPQXlBov

K3bJbNl74J/mIF6A2HPYQp4mZ6VyORqmstdJKu4Q8bjr8WglrbVXznPWqHpm6iPmInkohV6zsVwpn2nw

YVvU+UZbZaxvOsIn4ReYC8pEjMyteMPd6P9mCMYQNB
4PsaApRBztbnVVxr221gau6UqGQfUmTj8hBK3w1fD9eA3o7xcpZXfrrYtaSq0ZOttQiXhnrUxPaj8D24

ZVz8qI/D7W4oA+FcP4lMWIVSaZw8Iy3PytVz3vGXJ7UFg0aOgZL7zx9dRjH5XNJqjyCX2YzxbkXYnEg6

oGLLyfZ4qJwyJGNGxLDypGrG6OIBc5ctmCnj95/bFe
U9oyI/QDc4UoIdYwpKV08fMd4hFf4bbdYhcbks/cqSqs6q2AYqgOqVOCkEwhrxvn/S1hsKkWh7D4Ck9X

kTloPppMgg13jZsVO39NA6Ti3DZMg9GZA/kkOwrt2oUdDtoFy+NprvgewX/BWuRSpQm0jDBE7Jw2/gxy

PrpP1IEMIdpaYE7Bfsj4p0y2S+QBs4gGMcHrXvMD2W
Br1mJDN5mVMzVpH9vFlO2QQ+vvBBKraT7+KR5+gpQxOAGSHferP4lHBNhGR1dnxX97Ba20AD6qiIywC6

7bXaHbUO8iAuD+rzfD/yENya68HkFBIa+2fh4WD5+Z+RFjnIhrwzky4xlLFSXxVFgqz/pwYJ9ucsrxoC

sYwe+6J39yMenr4D9hRSxM7oRKAMLu3BsQtNg82qsY
8/tlrxXa7fozFu59mpHA+W6o9WQZKDO+DxwEbpxFh23g1JB2XdUjqJUtbb/IgrcZ1amn9izRGHy1+qhh

mRJKq/8p9F+C56HIbLhA1yt3MnmYOptXO/AK/cITMFbI5T4m1P7MYqMM0kDa3mzV5NN7NAV8vlc5aykl

UZTA1ByI46hWQJxuKrAueq0IYfPdpF8aatrTaC8cdS
G36l4wbeeLDtIRLYQ1zLaldFjEY995zBLtRn0WwdUcFvETLZ7aUK/gKdudYMcw/DilJPvqIuhaLKRxvA

X02MZiyQHN2VGrfhgQ3/JqAggm3hBBKLevPT2JD72Wh4xitD7hyMYbdM3lzDjN2APE9iuSei4zg5aSsE

K5Y02KpXMluzGbU2ijy6km6ctWosBkqiUmQ3FG15vn
pWeu4TEgDunMsT2GWR96sM6gfZfX9WisVtal4u0ElIPGnfwbE4gSll6jOnV5vaoy3KI80Ng+OWsg1/qy

WqdFi7/lhd4fdB71P6yYJ+nlziB9NFlGSLnhAj09LDxs9l3R/5OkOqH2F3NdkHYixDULmtUi4yURu2a4

4YLewHVfY1FmROCL+eDPa0zxo77PSuR/v+f//zHcTA
MD4efDKCWzrYdNdAwsxJGhl9w0TSgTQOhmZ2boTka51I5COPuIt78f7AzKaSvd2/+AvW04HGpD80Y/vW

Lyi7qxqvAUpXVZJ8yip6SxmZw07ZfXX862R/qthixO6u0krb1ajtWf/P7h3GfJ25KCGqctT1f+iREQdD

ym8VLACCVGLJueSwrPkASYdLt0CKi2iPNxDmnxyNNk
CFXjTWrTPiwbxwroT+DmbTVGarVOViM5qCquXCBXut7Hse/yoMEvN+1YFTes4YOHPI8Hr6wjUgCQMSa3

8NI1eBHldPo9Ixa+jUcmofTN6zRmgJpMIQnDumvGbZMeSzR/5awgF8uA5zdlbvyw/OPr8A0tN0W3+2uA

5WHBwg2xR8qYC0gbdbuFCFiYyUbtPUFLHzFkdgJI4B
SIafgYslMh1gra6Fksjb5254QwpkbqFgbnpKjmkshZaxsWrKxmiQDOTqXOFVJ74jYC8y0wdoTcIGcMtk

lMlDzUoEFBY1ZT62NE53fFqRyoxqMhY1jqBGRuFeLzuB3mHGJcjnQiZzX55OswDu+NMQKINzxovMwTXb

hRa7lNgb6uBmGil8W+W/zelc9ZR+bWuxXDxfOCkbqe
AcwvCzCQ8CwN7sUz1O8M6cMaJv80mKLxrftZgak64/OwX68Y7JbmpeOpbfrmzgejl0pWyA/7GBQ9uP0Z

K42DW+EsNmxq8ZlscaG+xi1pVAI38hZLH2K1bfz25D+YWFTZ1l+hfMhg3nmYOtnMH0BWl2wONaXBxkRR

9iunxXr2F+REW3lu7VwRRhL8hq1RrXge3Vn3yMJHPX
kd02L9kyoo0S9kcLW21ef159WCew5qqPGmNzGcBvR07VnQdOx3UuBrk8JrgMIES8SkmzAnhAuxgm4slI

tZFbYfumt028fe7IR/oVyC7zIvFMQLQNHmtjdaSff57/tMsSCcOFPTqtMSp3RAZCDSXCsdXLpWEww04i

EdBWSAxU8OLekd83q+ECnuJpY5N7LGh/ZwUbpqNp2F
YkTLF4Mre3HgZoL8cydpEj2y2XD+N+cvRRF5xrdHUswyeQc8PIr7YLWI1QxQjgYv9UveFIrmnmD1lZe4

bX+WZUxNZplE9H5lA3jSqjGbhkfspYUUDCF9x+6dyzv7qMV7c8Qv/IBt4AgNz4/OVtesv3ymbnMOsdbw

hpX8mL8MLOta9WOUK7PgIuQ43Jr2uOmgoXdA1bSwgD
pvRlX+wDAIi0TmFicIjlG5bWYgpFws6OQIzFbHALU9oMPb1Vr7ONSkG032ishZDDQoJWNCBA9yrdg6/7

FV++387OYuCK47nP8ohkkf1DaQbkrUowDw1uDozIqhVSnHR+wbDMh0s6Ba0lz96545eOaP+MtpqyeCp3

mills+gQlG5CCUjwXMfvCSm3KMMAYx7hDlu2//eLcZfv8
iIjIY70KrA9BwHVM3BcMnqZqexTzEdm2Gi7D1bO0k5QMRi1zbJCEWfTE2JOZOLeDuAEUPbsF5vzuc4BS

aL+Wu1O4ZYP65ClKh23+kY/tb4SCp5SElHU2DDiXplpMcTvnZ8crzcVqarGpgjlZPWfXJKiHyuxehZKC

2q3FzgCfQxx10Zg+cZW7hCnEX/2gcl5/JLsUve7KXG
3J+MqbO1eRDe3UX3qh0GrP1b3t2UEs6f98KesbWU17G1i0Z4u9BYL/W56LHozDpmC+leZzq+gdoS1uz/

rgjgHU0wM4fJRU4FlQvOq+C+r9mxjwgoCcAMtKEFivJi51lzgNNbkZH8QXKbWZ5AytwlS4JvD/mvORLD

je22kS2r6EKQs/NVLrhQ/wpobknvmBm6JJPcugBYwu
vfs/cW/gy/kW0kAbFzD/ouJemnnLhpAqhYZsJA6Xesb2qrZ4Tsqm/OtWIh7y6wjvwBX4/LhhQKNRcU1k

S2/pNzR0HHO4p5/6MfWbRq7zcG18eTlJV+xY1rBis+DLih+HbefH3l5w6AwwEGb27d7MSxkWNWL9Kai5

bnB3YmYJIMFCnAkWLKZaAHbA/XG4E9TVG2OkcEoMlm
qXyFCrMyGfr93LSGRVZNmpXE43Y+8pvdy6evq1OMeIHnsvtCtjqOI8+JWXqp66ezcbfi0/FiT8o3jv3m

gOqJ0WiYnZOTgpwGG8BRG6viEnujbkjXiEiRRv2C0sHxUPuVKf35rYplejKK9l5zgIFiq0DuRUCqkYcC

+zKTb+y2Thnsu8zCKA32tVHhW7qaaOOY/mv7ter3D3
Ho8+C4TgVvPG3SL/I6m1BD9vauV9XqHaoVUkKZPBnP/0BXHMK6M4RVK4QUyEoRprQnEcdY6nB6g41G/R

WlkrNUVH/k8s2A96OHZD9y/7G3bYDnMSeXmT57GBVuClKxZSg2cvNcuwnQ/ew/1TfipdC411sBM5bhHQ

HXYfPlwT3rkIHAMz5xvDcjEqNRIUyKNUM3xTxbn6FV
pF3HZhsqI5kKVnPgCSSY0zrVtTZ3awMQ1Ul6bnA19lTAYuu+hGh1MK1u84PIc26+FnZU/qfNKseTUcf7

obuIcrxoDiuaaDAHOMqsahNrz3mzzsP+vv4eezhR0Uiln1TmUciVqRTEvmXA2BTeOBp18NzezSoNEOKd

IcU8lWvsQHew2qG9WjFlBF6EYzp66TqCvMU9RunNFz
HhbEgRq6k2073fmXjgufxb1S8u601HCJby6d1JCrceStlQa4CRN2hRKUzr30J95ijV1zvgv8AcXcb03R

ARo2dz4ZXNi4+TKnzm6bwpd9c/X6oU5S9D9tQS5WcgbjsFkncMkxPJufI/0YjZwLuN4et0qtVd3h3lo5

dUVn5Cmz6a+ds5+mHSOq8UQe3qZ86JQRmeO2DGln4E
TZLE6Bg+SZCDLnPUqZdj3jswFJFi6TvN96r7bQrg23gJj3hCtiDV/bWqlKikkDVjRhARz8myt4C8BqCP

AGH5IIAI647wBtbWUSbVH7OFSD2PX2yFkHUBqY9UqNMdbwohiz3NuhpsHaDwY2N/G7auK1o6HKVdW/k8

/p3dxOKcblwbGb9+tkeZSbZ+CIdLjaTQ4fk6wqV8VS
THlRQLhNStWtYaX0C5icXJrF/RsPnX8IPMMdFBaRPFwpVoCUTiBJdh5wlPRLTjAAX1ZU+ntBtPkm/iVR

NDrHVzfayc9+P5HSaULwfT3LfABD7ayZ3EgNOgrOJVyPC+yHPYqdxjTgxeKVHYEzcU01a/Mdy3sUNV18

VDteRlmi7meEPwGPqpRk+iXccTWKZIqU2y8pCe8Ndr
3drSh2ekP+6V7DF9/oaL+QpeENWfmAeSXwUP5m0QmcTa4taFGJ+d/gErnluJqK0IIzXOXdfmOXcvt/yO

6n0S5bDBw6uz9W5aXdkhlkiRVy+23VVlEbk7eIiQ6kPbqm9F0PN7lHh8Skx50N8YTpGHZ1HaDVbeaqnN

jOt5Jbcu88hXAf99xZwLR44Q30pCfbj6X3IztMzzzQ
i/TcWG7DpG3YHAiZmqkemm2517nkeU1VAXpjf835aBxPnSoEGEZWR7bQJ1sON8PU8rihEH/ATJrVp38k

Rec4+45PQBRajVPwBXnG0kM5jyIFREkHbcrVaz+wRnuQjVSCsJOOMzmg3z4fcZrn5w1y30GvcL6LJCQp

Vt+xkzfJfY+CGzB7TAv0CNclV+4O9o5DMR6Du4l23u
67KrL5Soi8fAyYkuZp+7Dt97GSZP33+TZC+E9+fJoK4O1aueA4R3dvcbL+KsQIvqGpSJTFsoExR4L4mW

nTJMlnueUlMJp+FXpoSUYE2tnwBNeOU/Hc5GOp0n2TOf0AOWwYfuyg0D0lnnQ2CfUL2kPxkU5LffvuwB

NE4B7MwXlfKIG8LlOnmRF5lOlgwJyRcFATLC314VmB
IF0ANgM++WDQjxp4yhBdY+Px7e9or30ovEvsjEG4tD4dSXN3y3RG5lWZ0zZYXZaujPmfSS1D7I9eebzp

E3/Ljap26bYSwcVpv4nmbSrcltRS3WSmLalIUPqIsoyJ4qdvBtICns7gfbUiq+iXTIwtFpYXHaHvDXW6

nnn/OM7qgD04lAXsc8OK4wwolaZJzis7TD5wOwY69k
3jl3GUkdBbzHjadAKk4lU+5noRboayUvRwU8QBWrpN5vUE2iUimxAOjqItGqED+xbatXmhzXz1Z6svPX

jiWl+NHsc1Bgw4D+QFRyV0ZWHycNwZ+pOHVGhn8ksPTGggcp2iAdtQl4WYhR/WXEsugesW03+wa1Ovka

AvjLJ/2eDQZEfD7Mwi71644Hie9vSFJpT04RPnzIub
vXZ+0eqvKdv1WvKA7N7rdC2+zPKky8JUCLXiVYEzjIvAjR3srlhxafpdgHNwh8f6WOJq3dcuQ764yNxe

JwpCRs7Gp/GEAhg3L/rYiQLf6sncXgj+TTRCoX58DJLawvTJXxtwrzuC1Y4Dr3yeeRu0wXpsmPh2JYsQ

2yh7ODMVw3liLQaddWl57SuK/RwbgmqqrWZWR/r+vI
WAT42x3TvLF2hLEU3849pMNfcrjIBBw+mbyrcM0FJgS31T/Azg9dH+/bCCuy8Mi+rXGZtuj6TJ0j+J1j

tEnXqgLztm+Q/I9wXBpvINw+TRr7+CWb6rVl5JCrUNs9gd7r7zoTvO1mQPVWLtPP04tujSlJISIoMXrO

RR4RuR5xpHqPzVS5+7pDa0JUFQB4dYriJxdsOFQ8AO
vgT+5z12ao3Epifs+BCP6dmhy+lj7CofQFInUWzTYnftFczgInkND8ucTsnEbaj7rkfGKH/ageFVhO39

MEvWmfQnmuH//eiW1vzkj1Cm/YAlJo9yzdtrwDW+hyyR4bWxOOsbe6Mbqojp83rPGqo0skjR+cmx86gF

VPz3+Jx9PjEXQTQAORtKXrqb0MA/vJR4TBg3O7W7MG
d5YP26pYuzmFPqW/w6XXzNddt2ZX7Q3wmMRQWRjavvL9y11RFm90XTLDYInVnutKPIdAso2A05x3UpmZ

fJghFfoeSmKEr52jzlHR+zhbss5e0QYSX2ZAZBizPuWuun0+EC6tOeaQriv16zJEs24k9oN4IkrC/Zda

J2Q5UwqynX9E0rNJj6MEXBO0BVTH32X+tajQhRTSkl
qRCQS4RiBX0SGG9800aW1IcBrfjhHHvvELaOJz5Chqucp2xiELHva/xMQ20Ht7Drb+XGrHg0mxiLfjyI

QKCqRjDsurZbD90EI4bDMkJ8Scm75DwrQeVYELU8dTA3RsXINDA/0I1HeDt8D5A2UqawIhYn69e83v8G

4lVRdgRYrPQ6R13ipv523rafLug/lgP4p+nnb+a3L5
UXftfFcBEdtay+IXVbyKusuLAbrhO8VLIkneOqxQWBpbrpEe4DBHdZdOUut5pN6fWIkeExjlN/MuxHJn

nxgQ1Vl8O15NIaVpRLp4r4Jc1PwJJ303cpzd17f6/OUlxjY2Lre8uk1HJ/ltQmK5Lk7qvLdT/KNe93cj

wXT800Tnt+XuIoGJ39l1SB6Q0pINL3H333hdHc2zKm
ihyKD0rYiejyLGTPltAQv6214jAtwEq421D/d8sAV6748cFEVOOneXWMT0rusxeVFjXpM4PosK9IYqR5

wBOwR0m5Hw5LW8Zczx5jhTl/XhfSmsgW/kLDPDO6a2/+W7FjcX3CI5aR0LZKErSE/7cY9g8jwtunj4Kw

bOvg6tVKnlkaeRw2VpnHlf/h2wh6hKZPyMP2pzIGQS
nxlMaEh4et7wXAiijuFt/vshKcvOGPzWlPNbE+2hvrMnrti8MXVCsOomureEYjToojgFcDVnl8DFQ2yq

4WJzQ0PjcS/WUZmxYiKqOcRQG2Z7Q6sqPxNYoMPGegEi4k0F+gSLSEGAzlfjF3JewK/ccqDxbMNOghJh

pSIP9zrGoJp926lvcnbtR4sDedrEaiGfpTQ9HxfBD7
eUQjFoqjJRmQbxixoRVP7Vi+8pHMkGM2Xjdj7Inss074F184qUvxsVc2052nUXVWc6wiYVu/68yp/uC8

Z/8WuQ8UF2fb37mjjXJDo0QoFvEE+wJsf/T8BsCaeSmH7yQQaVlRua5wDDe06ZVNuXzpXR3rgTWysd+y

+I8+8hf4T5gbEaPGhd4ZAQxF3O+7+pgmIWJJOsoby+
a5Qnd3kH39rawCg2pDYuXokp2jOSYT5khFYM2U+C1SD8TUI9rqxARW7NDMg0FdhgQvyyaQV6GN1c5mVc

PKQh1krSGe0aUOqCyHvdOtOgxsrde/glAliAS+O760W+Pouy4uZb37m0BRMHIMEhqN3YJJEbfufKhv6X

82LHKjgp/QG1nLqkdH+WOsPJV/in8y+sYiAoQH3Yyp
VdsN5a/JCpEJ883c9YhuRFS9eYjgaMTlkpO++s/jzpedTqa3Y6RIqR4zpluMF6gEJMlG1FX7FdyYey8x

1ADpgF5goN846H7pFVr4L1HduAm563gETY5WVyK26DKbQDqWe/ADey3oGR8tiBf2eaJMq24md2InJ9EL

JT204MOgts/kPHj3wjnwFRSNaFJDmmtcGf0PA35TWj
rAvL70RV3QSwjyCEgG+/hOhNgdR+YY99a4pJezPD4pIAzSv6N8dcqe80x1R7Px6uu6yDE3QzaPpzus+7

YKZ/3ZkPyAhbqKa9bq0IZUYMkVtzR6JshJe8pvmU/sYUPwtlF8fTW0RKAkNNBYjsxnEB2nWHuTQE/r3e

s6bD/UoAnV/odjU8vwNWEEQLVOdQWlJN1Gjg6YYLxq
D0JtVQhD8OOuNH23GsFGkcUuzHf4k9dr2BsQhepdFfEoOwwe2tanGqdNrp68NPRlD2ggjq7E17QujP3I

noF6laKJdrB8MJhau4flchJeGndcOyJCm8LBV9rx+EYp+Vs55qWnkLeC5oZmWqsI4MbF2O0Jx2pFb9EV

a1TYEDoWy6NDhPGyqJILPmEyPVu8aeq11lDW3S20dP
EV8iPc/+/775xdk+u4T3gOcD5TDQ3Q0+ippVOOHSGf1sk9bFZN1yi7mtmUj8Ekx+ZIEqP0CyK3phtZ+E

3UHoXux5OQJ1vXeBdgzb3104VZW7FSqAnYOVptr423F8+7BijoLp2yiglS97vZQgjDX97A5/3fzFSiRa

1Suk6ixrXXQ92EYaXPkZMNSJ2TCRrEDB7zq6k4x2qw
WeP34Apdsw4naAKZIXrBctbITTTe/qCiX6mMcgzxiNpL6/BgJKnOJ/X60E5agHpNGnOEjgcut2T6KoK5

lZm3T1kE6hUjnqlAgidVSywZVrGGYglb7S9QhFfrGhDbWw+xnEe3A1diNEHzZG3CgvUjPWxyQP8Jdmfn

J8PE59ryxi26jntXiasUT9btpLkYwiF8DApZidMocK
tuvIMIOrr3KG9c8LtJ1OKMn5fiMkuWEByZW+GzgesRjremBAQU7SuYUf6Uejeb/dHEFO06HFPbZJcZZo

ZQzuNHr1LVjkzYmc6fuMMZ8+MTUMysv0fes6ITqPnne/KxnCc5UbX015xqNKoTV2CiGPHyERzJAoCXH+

lDbET2S3BZGbpdgbH/aAXt6pIO7LbloCVqemdpF8Jg
gm+TbN7Hk7/ZocU0blRrxEn4Qb9dwQP6EljnLDFV7X9Z/ZjlWLs8eh+uuXs7Eiyjz+MwWzCfSyDDAXUn

P5wKrwc1Xzu2ln9BiK3VADdRoSrPpNtScd1At9XE+uYhQuOjAWp/hi2sATjS4venLuh8g7mv73w62CWH

fGb6l6Ei23dGpYMel7Cg8gWR4w4IAhhplje1kRPEnb
vWSQ1S+2e/svUjim4+8ZCRcqEg7BrGFvM558KmQggAVsue9a/JrNWPxURHiMYFPJUJHKH1tRtrk59mlo

FmBP2V/zikBmZZrum//tYhlMOQQxs3SFe/h5LPoK/xz7yv2rJ3HcQHNXgrPvHlWb0bXxusWKoq+0i5a/

R1icTKMhYwfPmgzHGsKWmduP+02EkfjFE4d98+hu5t
iEblUx6nyvDsNmBiqdnoxOn7TZ9JZ7WldDrAYwCDlNR+oJB0R23oWvpDVruLfxlP8j6OxpxBthklO4wn

LqmnoViaNdMfD0Dll0C6mJmBPj6kWUnARuv2uGHs2+HZAgGmdRNt1jdccF22H8Ar0v7hOFPI6I4N+Wsh

TfNbaj1JcsLfc9+3m6bn9uuNSzEhzahPG6BHQqBsUL
tHfz/q+6u6eXQwPUZDidD0hsqhWnpKFcScX91leg2UAn7UOemCjTEOkSB/Ig5xM+rShE4LxfC2Zewhmd

u7Pq+atUh1EPwbKRqK9iVmoOvEs5xoShYYmSESGtke/Kv214jPTMX89Q9rLeTvKnT2zw9JZOX8mDULXp

j+r/+Kilo/TBBt5yJYsHs+muzxi9+kJEwgXHG48VKSXX
EluCl2KhjA/zcQoyWzcMC2waBrhHzAD2CqKqVi38N9ZgwNzsYBCghRkjMFVAPFpc4ICttVdpNvPqdisa

GC37PsD617KW+9fFT+Ee4maVuSVVJuoK+arX6blgfnseC5w2HMGgBOm7hj+iDPcgda7ayT5ABGpQsnvL

WN65O0zmpC1QrnIjXC6itFq9P5fM5C1mC2cIFfsFAC
8pgYcGibsRwZ9nRu6C4qSdzkQl7CKiLp0l86K4XcwOMkh5Cqzt3mkGilUyTOJ6YTY+VXDF6+SpjzNfJ9

cUqsPszNGTqmMMeZZMSWfX5i+x7NaRbT4uMdpNl5zOR/R7KqnjcfiD/JC9gkf36X3M9T5L13cImCXJYL

mjeVdCZSMWWazJ0JeocU2U9aEbD1A5Sl/+oNQfmHQD
fJk0wWI4PgwPnHRw810MsC05Ta0wU+tw3eEUg7C/0RmdACH7o9DSknprLIBl4WBFHfsbmZUkrQvFg+UF

4KQ1dBPmCQ10ZhKGY2/YASTGaOAdBClQayXNR4rPYWhUTu1nuCXiZ7PfmkTdI933K2bCSb5xzrgS4kmt

T6Tlw8/o6j0VV8s6xCat6A8CdPeD61Z+mLZlwBRdW4
tJrqszIWr7L8Ze10snT0cFrpTI4HPzKxJtVKfFAkd/nO1Md/DlsYDQnmGL3IHHIx+lKlPdL0roCZti+x

bonds/epTq//uxQMPc8cQ9yg8FEE/UnwFd5hRfcu3xE3dJiX2fDGEdjoLYx8kGZWhGbJz4gk+MKR2oXW385

Gqgp+flBe/MIIG2X+GXO3/afgW3WVcN4JnaMpEziHS
K8CgWyfGnRxw4eKrna6A/94O9sq7Wfq6L4qZaBnmjJp+DHRlAgOeoS2whlcEIvRNe3qUumn8Fm3l6KTT

J4RMqEGVdG04tw3qfZxNzw4hksVq54/lj8OqAlSlHjmDMSziV2m7cqZAari12pHn27F2aLsmUE/YyMgv

kZYoWzTF55x/WgVsIvXLxn29VbhqoThDPsAaFI5cQ3
1WngxXsJjUK/ONmYMt9QVaSKCPOrt6nO4tVl8cIbRCqZ8xjqAjCXjE/lYl/g5Sj1iYwRnEaeWOJjJ0C3

BLiK4K9969uc3dYDKUhtx9Z85cODkPqOAK3LeKP5kr22WjuHON/fIl0lVgqyM/GCPQtoxVmzszfTscEc

VZ2ompKREY/2Z4nTz5+1CB/J0coCodSxxagBmOJgJB
hYD7XWJt2H5mcT1eqUxB9PwnwDwrWSiUFF8ulJ49xCISrDWYoAdvlsg4eYQY6uO5HxQ4C6Q5etC686qY

Nr8NpkBUM+DxreBq+YTRd2VxZiiVsJuIufv++17rAsC82IU70VnLBtqDBUZ8Jdy/zXe8ttUTcu1LEEoY

MLzCpDTb3XE3w/V55UMh5zX8uEFthOAZJATv+lrSts
0IyQB/A6khLLYXh3uRQsi0HmYwJGS9TkI4E79ayPH8uqMTkWHaz+JVXEY197v0CKrUfIlHjbih12Nq5B

K9igCQsAk5rFhOaBso6S/sNwNw9d23Iv+0cCwVNUraI/+wupBWX09rz/slUH9M4z4LgpO+3PeyVN9iiW

/LtZQpBzKArtShesVsAHS//bMe/0VSI3DYTfhE0zeV
FblTyQD+InkIMPnTeta9PXcjRCH5D4yrNb1NhBJx7nDyt1N5Z7zvHGJN1zrt2CIfAjpfS8ZoB8WtRej/

s0FiS5EvR8H6hgsZtRY88uOz2+/Xbveo11wGgYAjv3YLh/1zFVvqpOQith6hmANFaR+PpUEIWAUAFl2X

BqD4Aeb3r+heIXLJUCaRi7W8tp3Dl9dy26IrGgzs9Y
zn07imAUyHdf/2iADMdE3epbjZLeYF+zPlMUaezMNLp6eKGABGvMChNvJBM6UTt94/lw6pz//mlHH4AR

w9v6zsHb1DsMZwfzEAVqqd0IiWizgXaKjcNfJv2q+qbtHfA9kKgKbeJjKncyTbA4wRvYvtTSXy2ExaTT

Vr4qtkgScp/yQrUgCFEXhm2AZyvfHMyEM+A0mbvD+N
bwAiVlEOq0D7pq0iZolFbpw/669mDX4I0/quYyJbkfxCCU8kMJRtzTLSOGdojVF9whSFHt4YrfREcWnM

DZD1jQmeALvChiSglvg1cakdf3cb8NhqDeWoo19ASohILk46I+qm8IyqcXOrs94QU7fnvsnYSs/oJMM9

9wSfX7xydkuWBHFS+fZOpmBF+TjTfnIwxHSB+y+/56
ovr4FaqSNZDLYAba9r95EYpCNxWclqS3neq6nGc5hYBzh70M2l1S9eLd2mJCr24HfoxcN4BXcFwrIQ+4

06C5zbfrOWaXRcEV/o1EedpaUTCrG90tMyV8Q1QfZaBtUw9yYAE4ReZJX75aBmvk0R48Z4Q0e3zBgpmp

40OacvzXlHRIujjrgx02kfnJs7opWGnbKtjwnmlnjE
RFzUv1m+MAuaSTX4L55tLTafraN04P+etzPzRLo513UCKGPAu20IK/N2hBSr1+c0vrtRsGCkhqRJ554o

j/wpbxsMoZ3FDyxvlHrHhowwK9tX3NqaQAex5PHdJZ70BEPF7fZWZCfPdhG5CadcJLSQHck4vff8dPH3

S00KGaasmsmTpsQUX71EzeqShP2dnhiVPT4f6JjzpZ
tPaWJbEk3oUJ6cNUqRat5taxrwqxvq3yYjtCSvAFwNCIS1gM+7ps6AHnuXjy+1JgJLQZpsxfOyA3J9IS

gslex8wmrk9DADPizcQ3cjy1CBC7grUARzstt8t9pa8kR4W46769Otgd+tpqRLaqorTPYuaom4vQ/9vx

I3pfSuUba4fXDfIskSJIyg9C6PBlpLdDDmINQXhgCd
v1bAQFXCu9u9zgdJdaCPowTl8GAb9SN06o6qwfjsmHCgG56023t8p9HIuencdRK/76gGv4Je/kUVIeLr

eA/ud4rVReaocio1AyH14vLEarvp8f/brzVh4ljTcF3knzthTEMoQd8RSwSJjUxvzyc/xvjaDvqxQlr0

Se09FqUEXGPgmGg8Ud1OrJvfLrWBIb8HHNu/mnew6I
9Eii50kWl90O8avaU11+aFpBTsMmqN8Kp4Y7KNxCDPqf186LYZPnqTMczWxB0YyenVFKAaQ7ZtETET8y

U78HgV8CjRThE6Wbb52BSXG9CyK3B5I6Yk5pxnrefQ0HHqNz2HCDLcW5H+Y9B4W9/ZyFCmtXW54AMBF2

vDD6lcsjxGufeRhn3o3FvOwahpfcfwbtRPt27hVY9Y
AmZJ7dMnoOUZFq3njPwaydSni2jjGOqyNK+1gRLLF4MvbCFyo/Umj9ECL5GfEh//tk+MZZ9JbB7ASSb2

9Y1fG9iaW7iS4SwCMwDUN6HyLOon+08qjxwrdQVlkcW2FRB2nepBnV6rcrPrtbnBPNw9cNMY0nnbBjvV

R4/giEGI1ajGyF9/c9qUUXzCQjpG5Qd9J9A+11Twyj
BVE2kQdQp2iIx98rn+4SUf2vsg6lCHrllgSHXgO+NjMr4u+1lipEfC6zwwZ38Voccp0TysgfcYH0ebcl

AqJSkCTDArjqZsLIeKByFrVuBV59lo8ngNJi4juEa1+1H+vTl/sKUXbt0XP+ZL9hoZc86l6JyOfgrEGC

auk4L82GrQ7gbA/TxMQ8bXcId6f/h8K7ssuvesJ9me
KGAof1LZqxk+QnheOTbY7V34pzysq08+yXmTnsBwiz78EhRSFpM7aVFfYEA5gvm7gpcYT4WLPXwyrKPd

cAUI658VIupGjblEyP8ZcTl3KlE6iPnuixL+dfrF+Q1h/01NtJN09vSreDvUI3tYIkJwMK6K713+UFwc

Aa1fLJ4pnuGfSuPkoohsbSVugNLHQ1AHaOpsEWJAYT
i3vbeRuBaqpfBFojSHNTZHapNZnTrbui+nhsVWt/l4pYWxOs8aO3W58iybqLvDv9Fh9X1UYj0NEvRdh5

ze+AcPuscSH10K4mrEuS4gp1MqmosaFVwrIfmZf+bn/aE9Ar7mYysD6UhusEZyCZksnQhBH054moARFn

7GE5ZaUNpZDoCgDjJpzgoItkIpy2xu8t9G3BqK2c0m
Qek69hkvMxor+VGfi46AMEE+B7brijidsqRt0XPQyAUmIGoozf7FqKjZIVDUMqczkyk6qthxcuUAfnNQ

99ovQgJ1IYN5QiXmjm2gB4Embqh0Zl7Ju1S6OrtDV1B9BNlSsHv/xu7O9vy/donrCdYxCHH1PRP5tibO

lzTZb1kWjIRRb/l5fwLVJbfjc8Yn33jFtRlFS5o4TH
nRt6C6M/QoAqYu/WKopgXPwGQO64DUpx12H2Ie4zQj5jPNpPyssUD3qBn+1NIFl3NpDlCEgKXGM1TGD0

J9gbBkcoYUHDcN+TyINHTMANin7FQqoD68mYF9dshV8j5ataYIKFxE2hVRDshMhvIMnKF99XZJy8EmbD

jhlLp7CZVF7HudCQi0dkIbETjOY44PgA7izEImdshW
bkEmJAxhBKtN409SKFeyelFiKSUNSxgiZmuxnwgw+SHNc/yzv/R3suoaLUL4fVOv0lfowjgeCzbT9Q9X

/+S2iLiK2FuMwuBuwH+BBidMyUWIp0ChBJ9YEsqijv5mTysEHFobr3ZxvANFpVnJDlMXi/dmdgsTSyxA

QOOPnp3vlkLA1Ci+f1jCFv3N3uGXVrIqMTatAJ19jv
V26RPfJyuxFL/D31we0zNQ8GJvQZw/CSYpnKRkF+2k2RzH+rA39UGm1RP5JE6WVjd2TYOS6Q9ZtO0cJB

tRqTVQ2sjpvT2M9TzVG7gvsMyQuk08aAjeCbxMKWlKCM8TK7QX8dfWc+53D49MFvo5nm8qeiQ8qlE4ll

0IW66CnE5lcQQIvlaz99aGjlAdWy0bAg+JE884R90F
0gkihHpH52ob81ADtenNFlnEeFkcOA5AoB6Ndn9R+0iO9E63vU0/LFzdLluiX23Su1tKg2mX29E/bSQZ

qnvFKrtA7/dzZ9HnEfoeR4JP8NNuk+ankTYBJh0T/UgiXuGQM+hEptdXuSeHjPsskhucKU/Sw/qzC/tH

iMoW3/yocNhCtgRirNeBehGlJh+nzr/MCdX7+b0h7E
0IUtpFcZmLoMqGyx4VDmfKlxYapHnaOMf/4faP1kRks2zqgCmyh/yPW59BKCUapJZuAKsFjYngryGiGt

Ol6TQQdXJJWonRJccLZusgPuHmLnQlbHpshyWCm2DLauSYqihhjpFT6+seAPOH+Q0TL/hUsR/QYaf/3J

ejjIj10TnXiGSCy2SoubrRoOsgRakGzxS3WIv5Jiwl
KFSgXqqf5GP/GeCw6e7ojBy+HhBKwFbEteRsK29TbMl9YxhK8/vVuOh2+ijCuI0NoLhu8lW2Gud2Tb4Y

RQE7ST4YR3K5vt5Zl8fJ8VDJkEdoGEJHGxVdFM56l2kr5Q5e0lweN7VDKTe4imBKvNoGhV0GWwFV8vwc

hnaZ8sC29Sub27svZVcWA9lnqQSTm+nvG+QqWmu8ci
h8OsrpQVAVrGLQUJp+pq/nvoFPpN0abPF1N5xrAWO9n7TqqSprTocY/lbA7dulCQ/cZMJRKzo4zZqSKK

fsu09kh6LoIX2tDr82jYsItfzv7kBLJv4XY7xY30QWaYh91V9ACjagdAlO+O6p1lHqGpOB+KwHDit2NC

cSh7tc4I80OA7qFKIs8UjoWYNd/s1oIzcTU+RCkGy0
VWuoUvOP8RCuE5TC8QRLWABuLKvHbyXYazGfcX0VWErqmlD8wYG0t+1RUqTbwhEw7A/FuoxbvOqs/D+1

bbjnDxW7+eEr7D2slhdqeKP8k9ena9gKGAvoDjWQrvOcUz+T/WOJbK9rv0+jYaWUZ5F0+OHVBWH4utoh

t95dBoqD7kCAGtRuvIZygKfRS8THN+1bqKWXzhG+58
jOoqz5twibkCpn3AsUOtbR8ANISFtc6oIpz5cUH8qymhIx6VTNVVWD6EGial72V8/WulGZy17HJoxeHh

ttgBcdHzx9i4BdxYssepOZhYwQziRPUVFJJ8WVyEO7do57gOXiSh9WVMbT1gQ1uK7Fb/pMCu62YaHbOe

KiPw4TuzoOBvJvzDVjaYBbpgD+37w6/prREeiWJSE3
pkV4n1M9sMF+sFXtxpoex+1ApKUAZbfscBzsNUVMmkwCPaWIb8xSHm6c4oxMd3l57hN2dUNvF6lDrXYF

8bHXJcoDdjmt9Fj4nMs7QNpWQXEZkvR6WJFs0a6Wj9Ap4Fx8tLT/ryiToKuUOOinNC+4WPl1yZaJPHe6

q1J87xodg+rH/uGuseLbGjUTB0/vIuG3WREjq+XGEz
6x7xdQ/CxejdapjrpDO+kSPgUbh0WeMng9TLNcljsPxG156F5M5TiXowDqgVM2WA6N8S3Fo1Z3HM2e2k

YlyiUiKrl/5zWPlLEiOnfji4m3ebOioQPddsl/REPmdfGTwXI1XMnEuXt1oiPs7omxHwjN4t3KZJUcWU

7W2tcOKeYxi39jWTYbjDtrxue3QbkYXEtdQKCNapyp
tKalZqw2lDC7bkBA1rqe2qnNhKWRdebZ3/aEhxUCfIfquZv1A15Rg5YUey7iaQsyZ66SmNZGFnGdJBNM

zM28DCMP7ofr3S5A0AiluIDsQ1A640zQ2BBVwcurgnBjWVXS2rN73jQMGfCGef8AtdIhbrF4jkv+HmBV

Ep1GQoK0Spy+2/zrTWsULz6X+6JJBt1Sj9XAG2Inmi
hD0tWqYkGCR7/jN3VWrBU8AvkaQ3WTiJlHLpPFjeWaMI2GX5FE+URCdUTyNjmW0Eb5WYnDV1219kvrbl

YfOdu8Bu/Qocg6BjWKriycOmxiXFuO2FBvxU4+9t2Ii8aOl363OJDVreeWe8tUBkIhyRcSvUSQH1/VTG

Qny+u0Rpl58mNikOoxJxpqByoplELXxxHqGrMDkYH1
OXOFljFOvG1NfIRhtPPDaUa1Fz5fymLtbbWAp6GuCiFuRRuvTB2BrqCdMkRKMpdMHDnoMoJXBlQQZQHe

ng9ouckm6mPRfTvERo77kzMyYMqpK0NrqdoQWUnOEkFH4l93/MCv+/LjDMnYq4FDIlhlGxEKt+XU0RGy

5kWhiS9jQpLigcL8Y/h1AQ+Z/5BqGI4MkXnkwsZBzS
4wirBFiNCsgVVlzfl9SYEu+N5jw9vwb+z1G7FC3ve1UKWEfusZpXNIDy7tXZHHoNJUvgOpRnpEvyVXf+

2En171RnW+XK0J9C7PssbJYxDRmnjwGoA7zMNfN4afOTUG/6oazI0JWv/i+M72ytXgz8EHBh1/v+EMYZ

++RFb9EnA/an4sxl3DgP0xaU0vvgeQWR1FziV3JIcD
kUcmGkvwgCaM/1FVCDtTEj5PURhiqQCo0KF2cu3mLOXxPQVJshAoO3kg4Nzyj4sI7bRFs9wDzzNLprRA

jRfFSEi+SDuUihxdmK11+0aAcxv8u53rH3MeRJ1febNb1FuVyHly4UCNn42H18R7FnP0pPGFRsxnuG/z

2+w149sLIZ3mFl//6Y81Cr+UYVJ1YmXRX/2uODTrYq
iev9jo9yehGay2SaRYHbYkDbaFvw/8OJFUQHfaWzKmtyw4JOuo7q0V19QaFFkh+ZxhP23AMldg0L1DqV

HGZV2z6EAoCQcjndLL/PXVpsccL1TFt/q9rvUK+43Hpg6A/YoyuLMmJzRW6UtUcETGlnvm2JP9V67Vn0

tXgFz+9j3eZHEqqYXLLfBrzjsEKjIGMegaTRxwC/yo
hp8JZGLpyoDWVBgLKyPWWfLsAv9s23G3YwICOp+N7LSAW6xOSRzhdNDn/5KlHHPMO/EweEAMjTHsf2FT

Gk+C0RU7SkeWK2kV7yc+AotmVS0LjVmPJ+kTDLWSjY7Ql3G+/U+3C+zDhbJoKg1JojOU3z14JjxXzldX

2figvTgCHfAouQKgtqJZANzShKdH0tRFq7a8gZIlja
752aveVlDeEIj8u4IHethN3Zb5mG8MZ8Cr5UK4OeX5l13z3aQfmtxc4euJB2CSDgCV+o8tRKmb1K7bOQ

NWtx6QPjcAXQE4Z7w+i4+X94LA7wC9CR8TGrbndSFTQHoB5XDFm++QlM3QArr2DUHYdzuj0BZ8oAW82n

jPRQ/wRBbWdJwWWPfNDqI8pUKYtG6wxtdSj8Zh/kBO
m3qgj5mHj5ukbO6DzzHhnzAV8oZxNLU8IzF9cNVfxgkw27s8EPiHEfmoeNdSWbqXdMOpBliuxb7I+S2n

O+22YcPxdlYpx7uiIJRr6vNRGk/2WT7hKKboQ7cm3EN99J98LTvrvV42yX5WLSXyAOAU2laJiBLaxKO5

CEoojljDhoHq5Aped/fXBmwAwYpYK2Tb8zcxaPX6QO
afnECSf9hz9vgcuW2xcy42oBVowa+utTokpPlrVsu7Ixyw9q3NzyqkiHKFfq541L2DXBNLvkfYimKLth

YBkIMYFeOXhiFGo2yzXyTcA8rQ4xGA2BnViJCm9x3TPnpZSCCfVBtM/Qpv7MATuvSdzjVYc9GK8fzRSt

PVZDmlX80voLkAJoHwsQ9cjVbhn5nGv6sU2wtbEFE+
+V9f5IbpX+bYFpJ4541zHu+O8kP21frugXn5ywV12fKaQD+C4pTkXUE9WYa/3jNeg5zOLutebFmBwMl1

jrwtMgl24YEpBBcU9IBF13S3obnZEI00XPP5bXA3X2MYijqsSLuXKBAfzuMap0WZwfgNfn+18i0USSui

GfODdp2flNLiMtx0g0HeF4ltaSNrpCOPj9Pu3B7oDH
6jUMzwCcEwMUA/5P0jRUGl4/X8xLAVOeaqa6W//dMSV/3O4Vy/dhX5AZLl8hdTcSuyToPJIrssRdrMpN

hhErf6Xhx1OLFxX86ncGiqHl5URyt26rLmHp3PfwaNSNGgCh+2vkCFLgifqOm3Op1xhdtDvXHlVW7Er6

dhnKCDmcC0hNM7WCIRarRvEQAhkwhLd4XNJr/f7JqV
kZGw6QdDxZy2YQsHAsWxkDSejPQFDEU9zPm+DPv5YkOuhuqgPut2ztSGzPii+ONlvTaQpaBsvonQojTb

ge838m3V1FkBALscff4YmBtdWYXebZ/mVm4P1EekUdB3lSi2IMdcquuCNDo1LJJadDfO/SfZ3jdytWId

JroMraxrlQ5vtaLt+JEh85+iBZG1tTyQ6/OZtt0fVK
FDuID5Gj9IQa7slCLnH7qDZJVGLfy8kpgQEPd6MaO8Lm+IQNpB3KVltaXblueOKjqLtWON8wFoU9Tlsw

sbw6AfkjCKA1g1agaF0807c/NuWWvw5QJsA6IBSLv1WrnwVN19yjQak+AxCMS97A/3OS5JMwJyGnIp3e

IbX4X7/hXIoDznxLOaB3oJ4DNkXbiW0bCmZY0+UlVm
d2XefMhwawMJt26rpoVCu/hRvc6eJyDRCFX/ekPlH3i52ro1ZdMQl7mfl9VpQa2zCeS/FnwFRI3MKYpE

KXwHgIjK3u8FMgSEDiqAlsUj14qYTiHg7oRSlnDecNCHn5auxd2a0hYhoctf6fQzF6lm/hOpWqtar1RV

K5J05dEL0ZPqqAF4eNzrgM0zYGy0EFUKSWvfXPQjqq
cnp9RMCV52/TpSC5u5XPlH6OLut/gJFVZCdFTO2nmTVckUDRSLCLnRQxBX4zbsGwg4npCXfvsE01NJFC

c/iP2qI0VosvWzUpQ3i34h1+l7Dizxf5wMutaH5luD4X7YJ9kQBgYzsmde78QsgzQ/5K6YrxO8yKzHmE

vzLGL0UbYfJ6XJZwrXtlPQyTW5TJ449cUS8kCGxNhu
9a7yi/J4QaPqf0NSJVmI8QAd9Iqa8oR5ei4UqEveD1/DtHWvIrX2rIiMFPcfoEbz4cbPXcP83E2maDd3

y0Pzh/yaFCtadt4MqckosHZHYUPpxu1kqFH1gSiT+P0mLYVjs807Nd3LtXQlzUX6fOO5TpaKLi1DzMxV

xV9Yil+AAScZfvAIiPxKRTNn0tFkcwNZJcNis/0o0o
D6V6hraLuVzRsBzy2W9Xs2Xy8UOjW7bZpCjUibsE/B8aP7mtaotfW9VABzDxDxvdeqZA7zmYuYmH66AZ

JdTJJYURJdKGLtyKLNcFX5Mwa0j/Eh58hqt88FXQYDZd52JrFaEqLms4O5QqgjiscP+GYcDdYxmjpg7/

wxugGxIM3VK8Kz1WVQDk2S69UUioQ6Y1e1gMLEtpUa
Mp8tuQJ5itUXW51U0VjAgnt1lgf4/4aXEwe7sWat9ED1mp5zsgzne+NMe1qkQ4VYawBSwt0vAoKxyVYq

T+bBkdZrWFC2qiMbIZBF3jSC61y/mU1DRF7CqZK6TSY+s0ydu0ZiawOeHN3fHWTQ1q5dq06zWsoi2BUq

477EnW2G481tmLkCobkoHDsxgnerONpTXss2BQS74J
xwsPWwr8teP9d8fN4N5UxP2qINbpN99WdM496HPjB9MKDxrPGHdHCVhvDQT1Lp7aWlR1rrQ3uSoB+vCD

GIQn6QmIBrKpU6znmJTFukfw3jicE4l5YE43eLlvhDMBG7ArdNrwfZLeTJL+pohDlIGOhND6BB5jsvHb

2tlftdHfHIeGQyKDJmVyILBlFDPZYMm2Ap8hB8i336
rBH+uIlKbAh54Q3nn4hby+mzIPjNe6B2b1I02D9DGHkt7MR8m3+oG51JhpMjCoMuAeAUJe0HtG84jq50

I3EpzmA3qgPmiDSWoF00PdRXgYOkIgO/OYV0nbcRiI3C9MqwYX4t03mBtRJwZuKwuAtqvwU8a8jcwgZz

kUNj6Og2c/5HlDDr3xmhugFv2pgWU6mRzuP2t1WM6q
nbOfZyH2iojXlwQNOFgz+m+VBejS12anEsQdrVsCSQlvO+11V1ufuKeY5m5Kle24vT1AkQsaZ6xRtJxt

2+bDhW04/00dLJeTibUmOEgea7hlf8fEw9OAgu92HDYmgjf/sZHF9uFHblz3Ey+xd6WON4+SHT0+ycXg

CyRpunY1bwOtqmjPplQvipG1dau3+KgT1a1ETvD0Zq
Wf7GQNId0pUYE7fAYr+wMMR2nS56CEa693cS/AJ/kuL9h3SwxKu6aH4iLkiWq0WWknhLyzu7VD8a669W

0T7JmFwYTCFveV8tLRjvUY3RvxyPPS622coY5SZQfOgNDgeAPYFJA+yDv8DMKs1j+mS5QhjUXND5t+1D

wZnrvF2V1jNiAihroM9KAKXQaoYG5ZChGYG27LWxhY
J056vUYoKCMRJrEX6kl7W8B2tR1nO94qthoRc094qBXfcPH2sXk7ES6IpgimRkYovtZ8JhjGWacfT7gR

AmPbt1SkmGS4/Knsqk1PhcNLew1HbahgVale8CeS07nthEzFfoCOlzY+xOmGW/n5cNVXXb9J/cjZsZRl

B80LyIolJak60Dh8RLeYypFYD2dNdUnLTJUmVss/pS
LbT9z9ud4cFjdAMRzRL+eGz8EFnx++KNxWoDDYVHv5/KULfUbzwzPvbcVIVbUaovCUoYrx6aMnzUf2fm

cE9xliBMXWZG3P/QbNcdaDaKRbHAt3pIf9qcwaKav2/OH79oRGH/el4vZgH9Ph0iemnKz9V4osaeGdOV

ZFnaKhMqLPA9gFMA055sbfnZy/ZGQSBHxKzl7ORlEK
RQyEOOFc8vGS8YLwJ7uC56YWTij/9wvaT5R3gRdjxpAXlourN+5QMBm5kQ4NV/8mdtDRrUR+6Nn1eP9P

dfvpFjvfdZbfzD4EfVze1sEBQxUUutto7zyXMa7kFFJvdW23Awg0W4TkavyLkqTO+ilXntiFSD/Ko/Hu

ECnCPpWoQSYqQ0ZSYbQD+itc/rLzmyaqinwK8DZe1b
Aroxg5qvLed8ITAQPBWF0QqMUsxZ/TSwkoX/a7qGzjUahff17bVMEy+xr3tiCYHK+qKKZXq+bJHy3e+/

fyvyxmEbGO2JBR9Sz/s2nAIRb1Pba/UfXE5UYDn4aq9w6qhFvrlwerYSe/f1s7FnLsrza+Yt2Y9HjYpn

4Qenyt6Aq+45b9hyPLoog6jNV2VwvJcps1Hg1HhnB3
2kqyOzwqsrwguBaHEPl0jbd2XtFKyi22m4Gh+ueA2A1TPvTpcz58p7sYLJoczksvWFS5qm59V3CJQd7c

4/ydNbzIIL3PgYuhhui2zxDofyK5coKIDoeOVFUU67eofdlFkceEUmM1z/rWToHvwbXt//E89nzhk7eM

XU4ExqLDmBKSOX06d7vNzU+lRYMpnOZ+zTcWLSKVbA
J8OvwedOuGohfJ2ecOo8V+cXfWUyTXtB7NU4VSm1A9G870QPqu/8sisup9R7t6rbuMoXLE+J16Jswz1k

0wHnRK2ZTYRFj009N8E/aKMCsH7MCLHNhoDoHYBfuMID7nIj58t6QDucQPKewmd0AaqKvZ6ITBINbxmA

/LjLCv9yhBPLyG/v2f83Wu/lsO6Ct/JMCIe1wXM1y1
oDEXUoKIodU3eEly2W0DsMnYPXUZ5sskSSjd4Cq9fTz830bj44SvUntNJ7Yvq5PoFt8rhy7BJ31YGjr3

frQGI4X/fLqNACNR72n/E5JYBEgLVZmH1J6ZHtYCugUCmvd3vQV0qvmwKfAPE8cmIGh4EssLn6Gz8auK

+Eo+xbqOajwADA7ttvARx74uC4KkvW1PwHmQHL8XqR
wJ/NFvJE3D2QMWmgR9pZgLQsY5KUvkk+PnDY+ue5eA+TRbVEuUSqgNCTjHpyNfcBpwOWZC/KBbVkKSph

4vGp0UuM0LBBlnG0cHZOLUuzt1STquaY3XEOZ31r1yBCRZYklzljRL3DkrD03ZhKhMisGC6Bcgyg0tH2

2y7dBabRiZzzsHA4oyrBkYcI/VnYTgj1Z8+1VvzaP3
o0OaEab4R5yahAS7wfcgp9tOl1GidvgE03iRShW3fG4wGueK/BL1b/8ohEPwqEnk5JNue8aTXHh5OMyO

XeSwOpIiUxmgd43B9Sv9B0hcdo1u4IzNwY0qLAfp4ER8vw8ONe6dHbHszFfBOU0tGIAwBZso2cAF3LZw

+OIkUbExajFwsUPc8UL2q5FpSA+JBlP+6YOZKBOwjh
gnFrd4r2BoQdsar8najEwfj4tCa94DbdMm4sjO7dv0tfvaM6U4sdgaNlIEAXI+8C8nToNBeIqe9X1YrN

+8dDl6ZxR/GzOIx0VjzS1ZzhaqhFtNZtauk1x67vkkkW987osNNU4HixTR1ojH+iS+evNbsqcd6eET6I

FxSEal7geL95vqc+l/YQSaoSGjPN1wdBX/+FCC2/+v
lNCI12UQuLB8Ow/I7TN30irK/nYuKEXsce+Tp+4sy6kiYVYN+zdPkJ+8QRq2dd7WJTkaUGAqMD39Eqy0

c6bb2pIuhy7ZOtzS3eHtAsB5Eqjw+EQa7dwGYcaHQrIoN20sSEMxsOQNreCip/Oszu5pbhnBwVRGOUql

sXdzcnvna6Q8l41STp6JkQQz+M+z1DV2s3jk4DC9af
+mviBLd41rrDWrR96twKnIrhPljxOo7h5mWEVJKRbidpRTG311HaJjqFj5ZWuP5zVR6Iy9mfAr/2TE9f

noTxACP08r9NcM6jqCyRnPmgOsUm0pAp0ss13affunMORq+q1XtRUnFM7sVp54+6S/5+n2ys3nvnIAUD

svt5CIQA4q30ZoUwraxxrgE8Nm+H3jlqE5zV8aLF/E
RAPya/CzN6lbKIbeHOwYIWXkq/DtXwI0PaSmVibKBvfK7OzwbFtg8Xxx2Z8WcYPE4logfX6lciAIEHvX

Pvtl6Nb5U8F/TVXQ35Ga/F6K9+a/sGTKVktUw3Zvi4JrzabhDDf3f94/fP4jWxrc/UGt+LA1CM3gBtLS

sLcbxGiHvVjL05alArzhc1N2vPUap4KbUPT7/poH6b
jKSF+qgguW/rjoCmRlxMABBRG7gZ7eAC0gJEn8ThmXH255fDxzCFfF5zDy7gk7/6jLWZ7KjA7bvViFtQ

j8PuZJecJ18ckspSzFzjkOu4bP+Z58W6ci7wT6zGvcVkHt7An0Ff1PZchJlQZ+cHsVRIhEb4oPWJIO8I

jqT9Jv0Ez2JNoYOyvxEHLBu2coa1zB7ZgSuwW+qyPt
fM1RkgbVBgcJ3mEs43jsubiBhbwq+QcNMpeKaU+/zi1vfsxD4HL17AVzQ2u3b2pbOrq/W/OMx0K7uTdA

AgAygbLjnrgPxQ/pZyKd0OoqPPLg1+ON0vHDSvdybJLSjX4I/8a764RfU9aJ6jQnxefqRzyAKkWcpw3U

hp54F+3fcO/Fc1DZYPQMLiZFZd8Qg5fHSqr8IWupUo
LR+RYhk2hl0kO489Z+e2c5N17kGPDzEuPWP/KAFaYPq9V/Wg+LyR+/yunu+Fkm8C8NlyCsB0V1sD7i5e

Rngnmq3kFgNTaEjBV1Jb6hQBcTIjPu2tG6iE1lS/KzNvYifSJJf8pfQJLQ2KAfJC4yn3bscBtcX/m5MW

TAj9tAdRYV3NnQc6odJkc4dBP0Z2YRdW7iA+ntVvOU
doM2pu9TGazXzAt4fBe7L7qS4MDDy5NwFNhXO3EUovnIouqx751p4BUtShTWoporWQDTYzXL9vvoQnUY

cfh3Kkk6j+NzB85icVMCcP4tVlCV2xMT7aYqG3vsnZ27G8dihouIN6jC8ejLPSpfOGoGKHCLZPXe4JI+

MjIPZf29SCN5O7shWQe+gMCEKbzoiY1X9eH3eCw/47
0m4y3yAu8XRavXA7+ZA9xPYrX9Wcwx143ScEHNku4rRILIILVapRaWU1fPA7Zyr0XdXxGwQnFKUpY/cA

rwWfhmet8WjygtyzDgI/v5SztGEr1jy6HVSXmodkMwDDvm1sLlo+OGxBYV7bbto+l37tmC5v4koTg3dc

BfINbKruRxpAVIGZq214M4aAfCWAaSlC1mUioYo9Db
fX6SHZaJMSw4odIW+Q1ljBnqgj8OLAgByTrXh60W6sDZN+MVspfRmc3wr5vIukmDpoIcKMmotIM8vT2H

sMkozAV1/bYqYFBq41tWWNb3dvrEyjfokRcwqxdb3N2CNXnh4zebVeZKYSUUPiymObrGkUVIlFgGSh+P

m05ybs0xLGYibAo0c4z3V07Dv1z3TZN81vjuUOwJWn
PVtxuOdoZxf5aRsiFIUzkXnrDJ4HUjW0eekepF3jvdRG0bVXRKgqMeX7aPgM45V20uPFJq1RA/aSMmVz

aki6cFkFtX72x38nZG+xM5EpkT59vo8Jw17FY7R2To/GUUxyFpbQashOQu1ATkyAiLaZeq6kTfcuEp9m

nrCOxrvY0/rYjIrKOyR6m0p4h3iuehYiZaVeZZOF2J
FUwjWl5Q+n5S8CKwGLtF9Z0Xaue9OLcQsd3KtUqgZgtzFu3SsnQRHhP/ooP5IobA8s5iutzySLAAJIP2

ju3nJjh3DxKxgD/ro/RiHsRI4uETq0dFtG9lANOdN+VI7eRd/Nw0oj6L9LoIhvG4PdvDU+mdl1JJadNl

V5bbSnHbxNgXSRT8mMI2UxsFyDfkBKsqtPai+yTD6/
5z+UedFn79/N++BVJRyjnkKbaww4nXxYGmaD4Vfxy9sr4e1K7WaRRxc+tPM3WtaYzEXrbXpRlGjp0EXE

K6xrgtFU52p7cDztfupN3U3vWd6qcO5gRFuKYx2yVhcFquxUvsnYi4JU0hRP5QsbuQbffWZSfGtvPCtG

zkOq5FSuiZf0YWjGOyDbbbmMWIyHWQoJ/UylyOAMEM
FrtTyMvtgvRhkfEhXil3d7NSZtHaWgYy/gEgt54MO5xe7NspoKeE0Giy2Avuqob/bNZi0ys3wdKN4C72

kTneGWm6qCnGklV4awBIj3PqPlQ5/2gEBuy9E8AVbdrCoBGZ8uOXwYhS+xmj3FUU7vxcjWp4CyRoIIL/

ZYGNYqOcHdAg6zIASeS9Ysh1ohI2byiWwouiC6Andg
FRWNg+eYxPY3Cpmd7g/168vl2vs95rRfDHm+hpsySJjsmAMrsQkBwk1ab685vHPiV1kHO6+EXfoVf/2o

SKxt05Av8rVyTJ/4FIuUaJN/Kuqqmi2np5alGkokPEx1Xwx5kzjD+K6zadBkkrt2SKvO5bsL4226IVph

+hC8VxanRRS95WRwcgQI1aAfj+jBTn+GyfuJy1juIi
1cVA4ohgr6oTCt1IG5vlU1rKSrvbAen5mmSW/QL2mxylHAdyxahhr0FC9DYy+YGdIU4EFEfkUbjHOpTz

LcmF99TimYKUpsY/8pGubmmG9JKOq3jpXVNdKYsFbFafltX6ZNPTcJ2QHjqYwaogTCoag+HEDc/AgIld

5DOctSpmtpRIHlDeO5xPTt9VCya+gIwvNXWSJI4qj/
uI3Q5Qxf8BlD1DzL/vliSpKo4XznUyUJn3+dmdMXil2AbGf+SKmgYRgK7PKjPDo7mPSI80OeCAgWUtiM

RU5zQEg+Vny+0ZGMksWZDsQlFqYQ3BQL2a1LbMyRuBGmCNqVq7vtOHFSrFvFlJWtdrUpQPxaHI9FuaW/

B+K96mMYsoqwNucjJTnRQH3PoVlq0Uk14zJCe+b00t
322KYeSP8e1pR5HXoTMY5LzFVIgIq/d7XpQliBoL0qA7bgQjdyMjPU23gEY1RiCR3YBo1NiJctL+V5rD

hsRKha2gVu1QLKgAmkD0h7BndyEWkZRF1Ha2SWj2/kYJKM1UX9+iQv6xjUhE/JH0ipxkryT933+Z7Ljs

p0+QNfP76ZSbjITbRO7ZbrN8bTUA1kxWS77FUKm2iq
AViATopuG+aIH2d6z7kzYQxvbHD0/7PzJfYr4BvDLBGp09cukYR7kbzNkkt2yiGq5Zt8RIIbQHl0H8sY

/v4rqUVFY5ZDtXo+N8onCdsrbUB8Ey1OoDxSmtKUvaLhCqOgMFp0VfaKt7S9Nq/lMk9ow2vYEGLuZ3Ld

7YE6w4N+KD0VrcmgQGSRkEE7dnSh9OolVpHmkXr67J
W9R8rvOw9ivmXdsWX2ocY6ZtI5W2QbgRs9Kd5jyUVLkQVJbb4lSZd1Qp0sVBYPwNvfQhY8Fot4a1mwo9

ErQ56tkwbcywhOmbXZiNsy364jIFBFL7A2v5vDu0RV54oFsytmcSFqVnBk7yHH4iLBVU+s7o5B8vzhsM

ankjzcUn2ySi8w0ItyJUTsnB7IUmXoe2HUW9TuV+Xj
i/k7Z2dZrzyN2H0SUM525FpSavSR7BUDGjbAcqPtXUUr80jKC/cJ6+yJBlK7pnsehvgdH1AXDiY4LoxI

tS1hLiCTD2Cd44lNpNe7qDz5Q3uUEJx3nCSmGEas9RkPMRI9ZOJ95vsJtNIcGAIjmDWokIzCMvhF0euA

/G8m6noa+ejvqG1O30V/7CHMPj57seUwVCjifP1DHt
hIKi2dcBNpm4eRKUMLFE8plYdXY80Ni0ltx/IkQEj9QB8y4J7tG4ZCgtox8JbEdAh+3CIHv1JCiOJYpS

7VnOiAm32KG0qQ4ki2sggHkJuprIJnd80NUnXCnHggAdubE2mNVTN9G5KbEHB1kRHRDeSmuyWWaObLu7

ur/ccyog2MeaarPRa0ZJY9bctxrrxZmSrr28kZ8aCl
zXX7+JwLWg70OGrTFic6rT3kquuxxq+0KkhAWviBbndJagqGdAEr7I0h99h7XpXEo/uCYvKa3MHuuuW0

r02NNzDjSsyt4lK4hDtVfYpqV5CBIjHyMJkqZjSoK8d/zKpRWN6XFHR9w58yhNTyyUqzqnAZBBTclz7d

BTR57acbyEA72tDWg37Oka3V+glfjcnyM0CXpH8UPo
4fPR3zaLR/svNJqk8mUamHyMSNrDLBeUx7shdM32RUiAZpHN+0QTNk5AuQZ/Jlp4vibR9UscPb6ePLd9

mcvU8FdtH5+ZNh/MXRn4OO4WW2Xg+LY550PrDOvaxIubER4SP/rrqYYKZZVX35letM2aR/nzVLsQ8rCW

8mPO2tDNaNOYwyeaVPM4+xMvIKbn2XOfpyGdVJcEkM
Gk0J9jZYjyXldcGdxLd5ZqFAy/jDgGAWQ2aCFP6kvsmo0dcu8L6nZGI9DgriWlFIqGtbBredo0MCJ7SH

wtUp9MMU/LTFryL3z23abQaG4LEuTaQjZlw1Hjf5JJOWWIPnHY+4J2FhyA1HPEe+2Lrij9mvGrHJLlrk

Gr8h+7FvciOgKE2jCGju7xbHog14P4VTE9+apiWOsX
bpwko9hL7ZeGRc2K/lQz2z57tnff3ndNV3CaHni+I67aADuU1TYWThU8msx8Mc5Z95rwCJbzLvsv7iKF

D6FwccNhxfoO00KEEpEP2lyj+GqY2b8kLuMqKQlobbTOG24F5bJy7zUyIQy4YmXlyL9Tty2olUhM92Sn

B5AaXf/3hX/V5hZHdkp7tUbXCHoALVDyKPEpSKDZCE
RjUO1eR3nLxdDhtlsn5DRKTgakUwpGNPwkFfaVF/MgXcQAOYWyuBeA29+taVT7cpTBcct8wxoB46+Cqz

18jwrtcZs+HxOnllnVLl51CUfTnKrRJC7gcsja2EJS/lShhAyG9jTaOv24pAoajNaMqj6+5jNzm6Zlwy

j2dVjT2VZleSKtdoyLdxqGrIse7ZyBdw506GDo0Okv
mSvKOcBX8nn9T7S+EmTUN9Pd4r0vBOnHT0WQHXsaDf2F5E1AVwVLVFE09RGuYWxrBznkoLRJ2lDFAN52

zzDaRlr6AnWD6f0kInQMFz8hssH7FGVcJyb5e2P9QjAY791bd/gORzR+aEVGG2Lu/EatT1E9Fcj/P4ql

Nc4YK03kmzJYj3v7XXado2bDV/CSNTMG7Ma4cy8RrC
oqAYN7sjKrbssed2TYvLiJ3Zvia6oDTaMigUOvd7bDXhZWgHjZtNC0+TI3o0oqnw9gmaYx3A6kbP0aKg

NGFrbqiHJ/uyOLQ8lwxbemTiY1vg9MU8if5N4CkZCZm5xbdUwvWafP6xUN2khMMzyxuaK8rGtzpLtESZ

dSnlInDWYwTEIf79Ma7q7k/xt6QXjcPkqu55AJef+4
SdNXcW1pJh7Kiq9keFemvhk14j1loGVK/2e7IYHdkKsHS0eN+3t/7M0LD8iaNMKRH4RVJibDn1O64cAg

H9ZeAA/W/IuSriiowtC39cgL1LCsWZvt9IKSIrbvoc6Lq/+UgIjXx9/YyrCJ3hW/kWslawum6ioZxXhz

aQQmlxiRkuOyqFv7BqmhQJGMPHYhRbXmk60bTFnHwh
rkyLpyHv07HiZ867BI28PtR10R/WShMp8iV+S2L2QJaKyW1AOq4Q/OQ6mS+Uby7MQlakAVFRf31i+Xne

zL5pM9t6/WBmtPAI80C2FSnI739jGu6L6zj5aF2EtaPQfB0XtaLLNQAp4UchV3eujAuP1Xohovh1Q6YZ

8gHZkggqHyB9sJmpTvh3+He62ZQ3K2mVmENKVZP3be
J4O+IBPsav1WFsYTWYoTkO/RhDWAsfkugD3vMxNOC8ug4rc2eL4l86BnM4ntcrOMWLJS8h/ViHdzK8IS

2dhbyIz9ksGJP2S76scxqozs2uRmPf2+64t69sN52jF734Kvp+7JGrvXurXxmYD9FV9oCKn1S2hPRf3N

IgPMC02SOSUSKqgVeTow2sxkJlyqnVT6h7T0Yb6xHU
bro74sTRzO1KttpKjRKmOOyYd1+LUAo2ag8OZpB7q5XzKIjL86fiooTb8tij/rg/l/im6c56Kfy5F08b

m/mXKMRlQsc2SggQ9wzuHUm4laxddQyJz374xSFI5q++sWkBZJPWcwpbDK6sSywLKw0ehf/V8cQN/Erk

lWYeccVAXOKqXe7kjxjh+mRG/4ealNISPmGrvfJqEB
AYv2lhFwaABzDQkCcNHRUDwb0oNeJ1/sSvytrt3D9A3tNEP91y/hchYaX3Vd4Li4C0Mhknihgr1RWIEK

bOBXY8LCrNQcE2vjWrqm13V1e1khmQPRyblQgBKNoHckyLQl0hCjcLD1R97kvoWXi1HEmyU9TM2cqool

A77GNCaEAqAkwZEpWXBo1Z+eV/EwLx8jjeft7/aTAN
J5FL1r941WxJQR1ihk7D56603rulA5EgTyk5ZXMAjt5vqJnF6E1RpmRpzHs/VqZzUaDHK3lNK/3g0z6S

Z18CLk9NWxOs0dozrh0/41R+rbfEWRiGEwxRHW6+x8oNS7ruERrDtISnIHD9fJt0w1BAbwkA73PwardI

5B6hcKzkv3AeDsE45NwsSgJobP/PxYvG8Qhh7lTTlD
ffTrswSF3Z7OkIlOhzmeV4dsIvXbMDLOJmjpFJZMas9fOGTIqRgWDLeT/6f4EkuLy/rMeaurQ/cdHIi4

5V8qgsdpyfE49DHAEsE8Oby1+JCsxRszoQvXIT7ys4wwa2BlcbZgfXNT/52uPRYFD+4e69rxy47YfoV5

dTSayLGg8jtlQWIok0E0OYEBGJEgN5lWoyxNUXDlmD
ZcnLz7dq+WFmpgKxt3mALzBljBw3ZQnNWYXTVib6i4zwi7H48ZooaH6M+dNdrb9AFxhWAP9QNfgD2oub

weHH6/WeRd7x3Z1qV+kQCBG84MYL+v0YBVqdVXEtqlBP0Bf192xAXvJEtenBzQteW12w1ifiwcvWUW14

bCw1y0vAaRuB60UcaQhWPKFkHu07k/HXm7OV+0hJRq
WlM4d9NqoyTps+Wf9a4yT5KSnoXpfKt+sSUIRB9EutrV1IHf5mv3QGWby2MNHlt6+WuoxyCkQJ15ztPJ

E9HuY+yOAnjirXO3Pez1vyX1KoPxULHpTucLy33fVklzSP3I+8/BCoX5ESgDFEUrnbSClb5SVx15ciHf

6TSyvhTx7LRmAuDMkD0deBMxtW41ZSou7f8nxUxcCo
Ta7evq/NxE/e0fbTH2tuJz4T2mFhvzN5vLeKSngKMH0zC3TVtOI+sRcSVtwEXINJj+hMqY2qsLTjORTg

fHk4cFDklVyWfDh5YZC54I09LHuuAQLApeEOj1ZrLDsn2d6EdTFSclQLQKJFzdovqaGvtNWIkL5czukH

XNlVH4A5r1gq4dlnElBatdpeZ530CVpMdbrT1dtnn3
NJ4/IquUkpIu/YPxrSys4QCRUPk7wnLeUwSfBcR0/DZP0Kq1DReDL9sYwE2qF1HRlDML3PH7jeP9iIaS

vGrz+UiKCEKqmp2e/az/bKc7sD8cB8954UGnsEOo7zxQU8gL8PmsYDt1AA1f8p2cqKXVSKeSyD2oL3uY

Dylhyhs7aJ0rk7dJPEkrM5SeKk++oa6iQpgaSZvGES
QZCnZwocqmRyIHrSn2+c9kGkvnKFF8Dx5L3gMEI4fxumbVbO8JwMKLWiC4XR0boKf5YXGUfqVN8gDvt+

R+fYTi+ZPnrQx2VE218YNzfpFBHCYEPy2upChd3vWZOSxwpzYyzxossqh0eL851C2obA5wPOEKVnZeG2

MuLBwdZl6Dy7EUlpZMCH60ozHmTaH/3Gx0twmQM6HY
ncg1E8J0FZ4LAMjEJ1C7i8Tf3fypAunOJFyeDlsHTYi858Wi0vwDDIJdjQNTXUZK6OlIF2zLoOUCF2yo

DCWgDBW6/fI/Y+99WBWaeLLNS5DdH5koKKjBaAmr9/re5pd/uFixjC09XKMPZZnrIJQSBX4+RD/AGa09

ZnxLnVAHNBke4rB0PQtJFaFb3ndDRgdtGZgNkPT6Vo
24fPepmMONR1ALXXwTN75Oaka0kaKN/++DKuaezHduom+fA5cMBuCXdsp/B9wN5k93ADF+lLz3kDnH25

Uzk0PGe7OqWCDI8gHy5E6e1//L218aRbg/47VdjJRzc996dvwPXe9nYke0+qe7N6E5ghSNNccV8IhFwO

TZ/8b3rdHVZsA7d6y9bh3YuatIFAkHmgl7ON130V+x
g+M26gq5lH3gqzIUyez64jfA73N2nso2Xw+KrQ4/liWKLsj/xTnU0utLsFWjL1GxycZ3rce1NdDZx1F8

5X4Gbgm8XVQfy7BEOxbyxwLRT3pHq4twhrIh/CsTN+uUkwl+i77Dc0BSYjGWAiB3fgpYMl+3zBDHVsYE

Q7Ad+Br0fBdbuHItGeoyCl1jKrnqL2/MHZLruqsQxP
At2+CX1LHJArXO8xDVc87ELbhO/Hpsrpkq9aNdVfs6qvKVs7bkdYsfe0jTxCk8FJcnsM8W8aHQ8ScX/a

nz4KOD+XPs/o1MRA0HSwVXZVKq0XnSUjtxT/eMWPWVja8pRQe6wjPG2jvKkYyXITR8fGiRfopsod6aVp

/a6PH6Q/ttL4zdn1TafU33lZB/9kKZlP2WD7xinHbh
OTbCNRlGyivKnwXdEJqFJZssLu5ouqn3xxaINchyN101sFJnMX0m0ufeN0aguWn5WwzoLmww84tg42+l

B8XItbdsMSceil0WV3cTr72Z+llU2UEvSBv+Ev6IHFoUzR5LS1utRk8PRwFsbyF5o8j1QPUnfvVwaPK/

4HttclWLWSghZ0wDYMdh4ltXvFOIvWi8tLyhNNnED0
9U/zrtP8f2MDrl8MDurkd86UGQPrU4Y4neI1mtYeNosyemxuqkvBb62pBU9ARolLjczmlZ3I0b+URoYz

VYo+sHWBG+rA9LHecUiYnxOsUM0x4SyanW9In6IRVAl0NGeBrwwBEtyTQ5Wowcw7C0wFmNFHnIARFPBg

76u1SUYJGBDva4F9fmL6epwFUg2DTj+oy2KSa/3/by
+fxdLMM+qclD0Uzxk0WQ8z/k++/qgiGD02NgKF41aNXnIXkk+jtHjif/RK5V8I4he0bNBnOunA6e9qAX

U56EK5cM9QiPuNCJgTauG25/+QlZ06fjwmNK4RhZoUEtlY6/9NV//khxiLjqscBQge3rrAhx7TzzombR

0QNX9TtzSlCIzb7eRVQgiL313qjBNFiRWDfqXDsw4Q
nDR7OvefSOlAWcohc6syIs4PJcOw/N9K5l1FakNZOWu/3peSoX8NXNO56mWmI+aSWAO+RT3/P4hC8zgi

fha5TM9fK2dhoWuHaSFiCS4iwN6QhgT0csTWtzFwd0YLoNYQrab2ohfMDE5peQDkpzCmDxZpF4Y82Jkd

r7ugv1CFp20ACUC0tKZPeGsS5KF0faLV98LLl4uOCX
QHUEzIxsLW1HHtBhwF1SUqpksE90n2/9x5jXnmodWXjaPRBqcLX8PU6KebiSqh3z/7/hxIctVDLiDn1U

jx1qp9ZRMBIgALefIHyMqoQbGPkQthoeOU8jioSpgWztiqq2gBlNKexXckLNUbheeYaNH4VgT7PnP655

uLdfiWWoGfNrKN6opgKu0mGvJA60OUZstKLW7aN9cX
KCkkaYQpMS9ZtLKLBq5xRbNZo+WQagI7ZfF0kfjFLPLdjJkkVtcYeZkviUg40rXJo7Mw0halq6PMfsLO

/4uP/1qAy+T/daawVA8RSq898lNL2wxPPaR48QuMtxpeo/etDXHrSOutILkmltiev/kgFFQwQX2FYW2b

rQ53gAPz+6hHZlk3VXn/L2zfkDkb/L8FwuqIzIUIro
RMlkbG72hr7kWxUsrRIJ07h6nE4yWV4WYHFKtr/i0ToLGDNn6Yk4wR+r/87O1ETikFZzhYKjpUXCIX7D

FXQR+OpnzcvhME5rgQH99d1iTKsbCs5Dxa6E6w8VhinC5NkoPNXh0P5e+qAcfKIbd3xisP5ryEP3KJgc

GePmElZKh4p6aQMMNUKfViN+s0rip4T9G/tbIoD9G/
ry++da1maJp8xrni8aCKSjSp8kImUwdO3FEob7BtPQ53ONTM+GauUlYwBkjV7mp3yA30LoM/asf7VKEB

Bjx9ZsQ/BXBp/T/Fxx54V3jJ2Od36Zg+MTsZRLipebN+Ategy0Qtjmj+wqExC53YRGmXGLKF36WbdB2E

ThQK9ndCzLTcRQvzg1fADYwZ3GhZdxfijcPfQaLjtL
NDf61z6+tpA+P3TyVovV7q9IFvvR30Jm72Ok8vNOK5bsFsGfTpA37QH8nBdNASXdskPBJZwPul7cC624

gkwxXJGRmp7ronABr+0SjIt6sYX5ZmMyHHfCRYJV21cGM/mdMsdpCEfs4+0rKwo5/BYD+usNkJ14j5pV

5iNNqwIpf44tJsxwm3uBQqTxSPKk6qFTKC4lEKTY5B
1pp1+xwex3Rkx6L4rvfn6OMBJaXyFy0QAjacJs64xaTZd4PE40Q6vGITD5ChRi9CyJ6vCeh8nuLyhyh/

DjWX8lR/4lEQoYecsBcU63//GIgp2HgDN6bLydNHjkSeWTyv8J8j2WiIS2YEi9qlMt9uSlan6/VwtfPB

rzsZHeHYGumoMyWVvNo6iwq5ovzuUGEoR+p1IlnK3+
QDALCY98CcAVLgb3A5mkFkf1LKV04UAUzVNF98SV1bGUw67faRUp6/r2tJTrFFzhdUriSP4YJ10J3EKU

2jSfEZZOnLxFNYTZn+JZwf5xQg6cxQ9Sz6IKRcw8g/KEArBvsftOryu5wf4zUV+JMHqPj0sZG72376m/

8pKDN1y1+sv+nI2Lxze6JW9IiWS0xgnD+pcLbwgQ5R
g/wMsmGLuS84h5F55PJ5ToXZZRkaRYiitvK1vUjadK4Yh4x5wGWmHLZe6/DTb1jFkKOhBJwtwr5w5xMW

afm8Bhj7/FXOmVx5erRPJmX9RHbXPerKLnJc9Wv9EIYGQiChsc3QZluMAQ9BJNDnJHhQ25YA4K/eA8Jb

VwYxggDDmp74m8V+zfwDLhSTY/ufF7JWSUHLo8mQSl
2KeWuOyiiQjv8Ak3DG7QSn8bliZyBhkxYmic4ko2i2m7eFwbGQCTU4Snk5kFMIqI10NdQ9sBfoEH3jwg

KXoLIQ4yyyFX+CFgLQ7uv7apea+hmwOMRMxtEvx/QVPch6/zJc+XI3j3feaHRTIGCiXje+lyowh01FCu

mjb1YuI8wvdhgXUBg1iNDo1ZTGxOSrIClVvtLWO8rK
J4C95EUenlwE43AQYtN57/d0wVpk+HjMgkNgISbxImC8GxduvEfBDgOhyz+oEO12Ohfr6K9F6wTTSOTl

pFvOY1jKh8p6eaPsqBZUd3uE2Y7kHUcWss209tIH4CmW9tRNfDZJy3/yo9TGr+4B/j8gJMDYZ6AWconL

tt6yPa8DrqwPDI7evOUtZ/pJOIrrKbsaO56YnyBU8W
8YOy0PxMYuNp6tepfBwaGr7fMjIff2yexgOne9YDC5BSLCKdZ4WoO55ohO7fmCQ+O+hPXH7XE4dmdaV2

Bjn/MEM79WY0VxbmleL6pU5ikxqItsDjbAnF5Me3cTbpq1/nptw06kD74YJZmwwXtRC6h8PU9HuHgHZQ

dnDjii5V1bgjmtisEIH0acxB0BoI1KlIhOch3SpOrE
A+I3/oHA0Zz/a7C9LEgwX64Sx0EBxjPvFpmgwIYM3KpIrFlpC2ZcEWRx4JnPzonHzKPGugqxv2FwQLAR

7/+[place]/h4ThXo9083hLH/gx17ag7X166wW01OikC4MLJq7Tnva9OUvJPcvT/kHvKP+H2V6PBtP9Jtnkm

pWbWOoYIZOSF8E6e5sHwzkz71rWQ3sE7wONgTGF8Sh
r9PQO9KVQk+tYmI8OrwmwmUSR17nR3nzeuf0Od1cKPkh8rXkMQ+mUf3weFU8+5LZgeAPU+IEbL2rUWQG

1nq8y3jElCX4n8ZBMCAit5vY9SptNgKJgvfeE4AbViIQPOptpvOIenkleBGchr6zRAIQ0X32IJQizVGU

c3u7SN6l/o2BNK2ebah0KGh7rxrFUZJnww9hcKH+2l
zCM3vI3nE57372hijrD6gCNNd99zPn6oJ+5U6yQWpKVMEeq1IroonB+sCsYNCoI5sABARaQzmY+4nps1

lBuyYMedwG4GzVJmbv67C5K/3aVK8C3QhoTRVfe2s41Pv9UuKJ0+BQtNz8NkoJjtfu1bge07fBZB37T1

Q3f8251DuMVfee/FPvvwb3ctHFKMfj/gcwJFH0T52s
4U7c2G83pJguN7QMf/QSObOu4BZQBISHbhbsNBaWwt3C/q5/zDt2lKnEBIfPOmrs8WF4QJK9Y+DRf0Xi

PBpg5QsmoaPAAsjHwJ1j015m/mTMrFRhrq5mOQ+98moQf2CzbJzdun985HCzlOoSS5Fa+IOFpwtlAvqu

cwbqec9CnAXVfq7HHA52IGkIvkK1ultL/75Nq+XLMw
wqZf2twt0s6DmGx8o/MUGMV7oy19qshSFzmPsrPuDtSICoPcd+rhgOeNEzxZcrw/A6C42ZghCIDrsHGK

KxknjJ1OKTre0pdQPeYIJJQgMrjOCHdmRqTRC6vmPoD3DwDWXUn2aw6gy3MmCljbMjv/aF3+YmbMfyqQ

avdUFNlo6JJ6ChHPcYZ2Xio8y4ZQHinW+E2ZUazycX
vzsUsJc4oSnnhPieCQMT5zFWBdylvaI/SM4kUgN2pWa6tC7qTr0zZ7NZed1uOf1G3lqmuJsO5ZQWsAVm

zYmaN/laY0E+SgbhXUiPrBaa7W7itrXH5p7vxY9Ixy0lopdS+gpVG8cJScviHUmd/J937Y2I9rlEWM2E

/4ErZvEs4ArSozl/5p/R5x74otx4fWPVoDICIIf53y
LX7ZrJ4jFH4rtMry66OxNk91tefCKKskE663vLaL3Sq2kkwDn5ukdMiL915R2ieTdB6yL8yH/f4jQXBM

slZVo8sOjMrY0z8xhKBNjuF1TD9x/XqHjqj5zOGXNTjyBgcg6ndvmKKv/EY6le6uz6UWo+i166c9reaJ

I866Xd8j7TboZ3s5ia9U3ZAgTf93f0Q2WbYxhoreAm
UWCmVWTJRbKa0vqwKviyiT2keI36wJ0aa7nDV/f8UqZXapQCgaBFbjZKb0Y1Wg6mXN1ykWmPfSlLjZqr

4xvNBY1ja5OxjhbHI9jOyM7trk6pEstf0772d6/fkrUgvpVWgkMjLc04pd47R/3PvE+4yWvZd+4e6dXy

WJ9elE+2tJe9GKaJtLi36TQVP7kI9hPguHxP9Q2Ojl
hRQppLiPWaB1CYR4Yx/lxWz6Uh3b/16+DNKEjMfy/O5uQBO9N+bvqoepxILVrZ5ET9EHoOjqp9BCnjXa

tYs/AKjQt3V6YHV9/hoDv3lfG6B2H/KL75GpVHQymRokpfZBFKpfOyM5LW2h5i07R0Wm6q2+Qg0dbem1

3j0Zr6O6r8NFMFTFN4wG6U4DQGfTO/yvN0Q9FxbZOe
/h0nXr76uRU7HkpRbqzqTua15W4+s6cAjd03sPj+OVIIPnMMA4raTSpm4UvTBKA8Nt2YRLT03UQpa+5r

Cdgn+oJu2ZeLVVtOFJvJSrEp/rMmn+WquiIC7nC8MFwoXRdyHQA8p5V6Oa2s6ckqUnHFTkjhL1E/g9AO

HWdg7iGotod4MNLm/izOAzSHMLbcA4iXEjYVIg7fXE
i9IroVOfF0AZBSNrOMLgwvbk/pfwqg273LXS4+xfeKVGaDFSw/pOH40WssxgObioevy+kukQUUM15Evg

v+n3D0UQipRUTGe6ZoO95rCTcYms5tR506HQFkTA4JPuiW2Wvf1ABaCERCJzn3CDb2r7ChVj3p10Ewhs

fAVJ7RitQnGf84ex8bYtmSBSg1SEApNKE1zOCYoQDl
5xJwx+GH0fXPdJ4g3oZkEkosFkvruNN45fwUU0yZEPp5sUwSnXcp4mOmL1Kq+xTcNIsaKrWLfNn989V7

OtYbeE3wAVdL3vP/vyFWqWfQKFdEiJZyHCM7POAj08rMNaJwyRkV5DF9A8/fwZVrwC/A8OI9Rv71iPpn

zgva3SR5YXceIrC6HaVyqm2Wz/fott81Xa2Cx+SoR1
lvOu+yp8o62aWIJiN53ULLofTRojwrigibeZr3IG9SDKjyiDVCggL1rNS87oyodz9FwwtrGxrlWNPP5t

rnX/J083v5hoory48NrMfDqWJRMAqsS8Xz990tTXLvk6RPjOZFmHMmbURSytURJ26S4al4Iiai6TKG8n

t/Q3ulX4pQfto8Y4Mo2RaKBbHd9tZF/W4855Q1i7Ed
CldImonSs6PrjQBVLKKLuZMkxj5U3uUh3CpCmI+YVWzmU9wn012jnbPF2eUtsITCxzUegqN/fg3Bp7Yd

OKry4EG/xVnMgvrbJ+kr9QMvBVUT5BnjoZgPCMaqnUM7WRT6dnoRywpi29WG+UfvN6Qch+47sVdWVkyP

9e8eg6kfrXSwuQr1r7xSgT7Ehq0e39cgl1VlpbnxYu
Jq5XD4I7w24fAg94ZF6iGr+/OSMmeVnDVrhu86DXoM/LNZ06mEmdYiPG/y7CddwNjrjwfWrO7lre5Wpu

R2kLeZr5Sevsx9/sZtmAwZZgSnQubvrqqVGA7WjUNZcAN6H9RanTYGuUMCjoW1ewSAsdnQDCmeOnc7wj

wxKgSlsrhyzHbSqxdEn5WUxFmKzG2PHdBxvZv0+P5W
8xlP1poFVmFkvv1sHaTlYphiKYHUm3+ko/lK7FlulQlsc/4xaLk9xBevlDKKlRnc/20BgVS/1vb1t7Yv

4PVlZV5phzrUNCLMjhcUwQdxZixCERywh/3xMmcxybng6Afic5ayCzapJbf5sY146Eu3Q8P9TNYxl8xv

HK0Owq5RHl62hGI0AKubeWCyAASYAwKiobo5C7HWzo
b/lPVC811v/3npKL4cd2G5kx54z5uf0gpgd7MJ1QTK+jabcFuSuJjg4rCtnIHeFDwyH3J9P/chhIuPqk

bT0XsxUh63WP5vzP1yJIaDF/3TsFlueZ35KtMQI6WO8Zk+Wkjmuo/uGfHgUzs2WSStEMf5a28DV2QsJa

jKmqs9oy1V+w7nYdfz7uyqoZ6s8+iwNzhxskbwuxKC
yTGQmo0dG2RUVENiwgKTgEa4EYrtE5PyOFUgBmzt36fgBIa6qFr+oVa4XUhq+ebFJS4G8QCy/5IrSmGN

d4QiTacvD7y5pEWiCc5NzCZDWv6n+lg/QA6dg4UnT/mYMZOjqm647yAwPzKEoKbx5Fop1Xd7JEce6KJR

G8Tl0mcs4g196ESGQPmDKNY1o/FKYV2q9ifzY7fJC+
7HJRyvlWZgiaNTB87JYbVBkXSb91a3tVBA+p36oFXYqKK8pR66opeaMr7iwjl+u3kZESpe3wfIn4M1yz

q1CejDDCNyYjx1Kf8uA5x+rWYlvZRd5t9Z4tlnjlrRdJoQF7Vq+pufsEb7mgaco8Hvvsm0JSa9/D3eGm

o52OIjWbR+f5xjZsGeO99ynkE8TofuiTgqLfUZI+L6
UI6WiWrFqSIO1DY5LKtYTBqrE2y1P5B3wwf4cguHlHCSVdlVDvjcX6CGCQ+ENbr3072HIYMwMkJaeXhE

kn/LFhMwfnAzin5kiasn7bJ9xRYVh5/5AXAAmg6UftZqi+1jeW85l+OUtXZU5j1toZeHfbNY3PC1MKcl

Rk+MLSnjoqdMxnek/JW770bELqyhUhnHBXM6zrNxs9
A3X9LkWkKP76d483uAs6gxmQG97dqOH7JnZJAFfva3oc4vWhpHRYheowagrAGHH5vUGBpCiCh7KaIWQw

Z0vt0XRlNpZvKWt+USoNLxY3dbzp3x7OHz6wF2tgEAT2FwC05Ac6+y3EMDmuIxPdXWY48oidNuiCGqkt

3m9wUurDZJ+Or9Xwgf4Y/bys7FcHLTSSlYh5QY3Dl5
6mSXeEcZdaC6u8A0mIkosANHvY+mbT7bgsCV+D8c3GVyvErby/ICdByNwYbXzuMiSN9/i9p8wIHp9JjY

5pt3vPDQpgDoSYYN4aPNSqg8auppcLpZgejjig/47Gv0xmZH3djKMSKZQfERVdPj1GHATM7yU7JnFGDq

T/vRuoWD4tbqaDs+WKITqQZSVrogXicDU2an5l/43c
L61lbUTwQqHV1UB09LWgG2oJTAicXib1vgvK9QZ+fPAbMlrpgULdJVm8guzODE/iOT9MbGnB/IUiM7Tr

Z2BjNzpOkB+mBMqe8k0TIcxDqdFwjSuXjXDUjqs3+UF+X/yOYHAIYo1+otncuS/MqHNCTjE79AqdU3ll

SlEKEupMMQDsvo+9xxlnV3VROmo/pVfI/rixUc+fjK
PSmSRc347ShfTBMNjE8EpHQXOPE16/EYuz0HqJkVbsdYftKHyXcvHxQNZyCTFzEzA/kK5E13UHZRTeG3

zlSZpjF+szT0dLVf7D5NW4BQgpPdmE7wTh8rIU+M/DaQnWyVAmkR7a7eYM+haWQAFTFo7pdMaoxALlQm

/azFmlgtpiWHTe7fqkv9Qh/tZD9yw/I9A65mtqbgnB
jk1/0nT5g9eIrt9pn3KS2i6SsVVBpxs6tEdUOJfGQsO/kxADIG35aNzAInu1Ws8Op83OniONvEP8JoDU

tZNdh/uKPPm0M3TAtGWD0M8HRlM+oCKV3tfWXKllMoygX5XyX6UUb0gr7cwwKmjE432GoZXcu0b2UKk0

IaQm2XenUiddN7qfwR6Nw4b3v2BAN955kZfMZzV3la
xp+8R0IgCINK3f4wvjKVG16Sjs3gtRG+vDM49xAGaifCK+bbxH8znDKb1pAif+346b7EsPJwd0Q76Uju

5IvpqLdNymZbANX28vpTPiUMgr3ncB21hvd9/dEvkLptnYzag4VI94PhPtjJs84FqnBCGBF8pG4/ZpYI

TtHp1z53vYE+vYo40UyjthYaUcOzXGidf7mHK+AODP
127hOx4yknrFEtz3N5fkSlrv/xeMfFuESKrPiYhHNub3gHaf+Eg7dxwZebQjOa+8WvirWntfHnzsDMC5

rruAqGZcu6FWz5KXaB9J9jK7dCavHi2N8Zb95rHJ7ezL2RyV5dxrX1nA0gYUGnB3j11lzeQ93QZhHmM9

p+tZALpF7de2DEsszHvVgMy601Ir/VaGM12dRYfgyh
1khCmjWO4RneeNp2xRo0stw7RRDKn3N6z6nS60MFN8yYbK4thsuSeFq3X26nSlpeoVSozWlpEKj/684a

J1+GgfWH+JTf+HBduxg/KePnpDD/0NLZrp6oWHGEh2bevyJWzv07EgKlyGj9iyWBaG0m+sUa876KU19E

8/Op1nlUXLcT8U6dW+w7cu9WeAGyizIlU7iqphjmDS
wdX3RlBR/hArTAhsN6n6k7PTTjHApdkaOlr+WxAoY8t1ogChr9mRrtydae52e6wbi/RGoxXk/pLOSIfX

uYHnk2W/TSmtl7s/nla9g1U/Zkgav5WPc7qV8Gfry9KKwekRwnJRgZTCGj7TWhcUCG/3rQcAmUMrtTui

Dj/W9iOJh4/FubVHY+gtBCsw/GMHrn8a/qTUH61Tr9
y+hyvRoidoyp/Cb/sRsPPW9suyNBgtSRhKfzZPSrT8WjSsgwSeaU4PRjIqwZe3wz0a3Domi30JGRMQ6M

Ol7HbdieCuKtCBcq3D+UvRKhzjh0HTBXdKvjCmbrmRfTYdrhENmVIbEbcdpsvLd04YXKzUQHzl4JRcRf

oWdE9qmmiaK9oT8DYen26HfYDL1BbZFuau4HtOObmf
O8+k0MJx6KGPd1dWQ02pm9bG4Wiyz1fcXYQzEMByonfiBl9A73x47Jh4DWYHdMx3heTWveOYfYtOPW1R

lm8ygvwclCkVOqxq49fghVK3hl8R7361IXKb5fd1XDNTbmbSWET0k85VAt6RbHbuE8oY/xTlEAaDLHax

jegjYFYk337x564rOifxPQ09OBWpfWbOazzFprEqWi
V5mrC7KLSkGRPHip+X0HT+rNcBST5eUnv875w2+BLe7TmAqnAVyaMq8WBnmtydn554t+vlC4Jq1PrLUW

wpwt4ObkBu5mXmFqtMiRBY11gxH4Kf2eGambi5qFnl8WewvLK90PR8Ic/R75faeWU673elM4Q+xo6nwb

doIdPmEhnjX4vLDAzMp09/vfc2nhvTfCQ7qL8ZZTKV
NVrajPBBOCVg0lxiXQhZO1SeelI/gpL63szM+BMMqOsGcNP6ykhw2ICMv3cSf2nyXgyBHt1Ym8nM5AWe

pejNp+MeGhRTeXwTj6+ZzOfg7fdXgCWro+wV9UC4BufbtzY8NIhOxW5Jlw/ubAIbYZMvBXsIc4SJHFLg

oJ3tf70/3r9kHkqfN2ofDCZmu9eAJkUQD9phS6vN2O
JkMGUnGay+KcrC6B+pfi1cEPorxZb77AknA30m2x0szU3SWDYu+EufnixCmPcs1IlWU14rRoaBcMbkBC

aA95fcKKGtVianFVoQqHQkz0Srs99eveBQ805E6dp2geivyZ+0wRwW4YlwfzhPvcSoo15pGq4j23LDCv

1n/pSmu3+GTtFqMQcBkdrDWZ/VzwVwYBqiPG+xuhRt
TQOKbWqJZKrbqhioOAxosG171rEvLjuN1fUKr5hO/MqqcBvVCq3Wf2LGKzwMgZN3j2sevCzJJ6vDGkYt

36YPfxvCzTSC6R1azUMgs4iz3IgLbF8/m0oG1N1whHnyPl5Cbj6+hHNVTz7uRUCQwvfD29EvY62LxIt/

fQ8EgqSxcXXgsimZruWL+ji06ieod2/l4HHuztjv8/
+sClSSYlb5Vd3O5k6lMdO6PI2PoovtEqTyu9+HHqRU/CqklYgODWRQV4S6XYDnA+FqaYNmmgq/HB01lk

KqdzoaG1qzervYP4csXsV6jnDiMuoc0SByIgeDxipi0c7kpDkfUm0Ib55lSmyReYmXN96jN6T1mqafmO

kB6uFPVF8G97clVLZ9Q25pIMpsirSLPqa5x4CUNL23
v6TKKeaxLkLc5WhpLG1Hi6I8n/7zQVrYCIUhBSlRQXwyupvvYQFNAmPOvvGw5lW2YCSDQKqUXqXZxjb6

ZJeZ5OzAZqXrp0ozl2A6tLe4x3JVXy+9HJ83v9YqrwWa1kmWJjQzm3smfUHoHoO5+iLytp81pl2n17Oo

lbceFGKuQxBXm5JvD9Yk0Up4I+8MS12FAXUJVMXNmR
Wf1tAyN7lZLPI3bNWzUNPlnH4eM9FejBDmXyz10SEm7eZKBcBDPV6nZ7kvAJsE7sh9EUcFBWkR0yuTpD

8AlKl6/pnGNto8U2W6sFZiQ3+Tyus8GlovaHcPrEOcB8e1n68KreWwa3qv4mwpnug3K7w2pJSUKSgWR9

2nYFMKBhtgU7nMPw2S/uRbDtGrNqOq++lVOdW5k2Bp
X9bjJeh+m/GQIHmyiSSlOpy/eeYr+9GSwj9+akv+Z0sBQ0/Pk/XjZ9eU9BMbkJbev0kzh2j7ZjRnwjdN

HuZDrwManY+c1xMzRFloTU/3WzAd9I+8EzGdxg+NpLkhQQe+Sm63Tigg2C3soYWIBwLCsOWBaPBO1Z1O

WygJ4LFmwDmqLNLqC28Gzril2cDk+P8+o8T6GFhAE5
KylAz09bEvqXV5CNzPLCVWqYD8+RENzlJC2TzVz4fJ0yIc/IcbAkCBgf0ibbwBCNjUpLh0J03tMLkqLU

mfy014Rqdxa/Fg6ECsZ+LJrOv11HjXmX/OEEJql4yDIL44Vy81MHyfdX+j/VrDcGb7kylwxtcEH0YwZj

58pg3PbdvmOU+v69Cg0NlMCYVdCfuEfQrmUGQ9FLDS
ixJi0G0dG27c15lfNxUMMVeDEZ/7haaXIE4TmlFvnHMCeD9W/Swf298yzFXqqUUn8lyX20P2sf0gnVC0

bSYACyOaIaZPPOaqeOQ28jkLVCsTH6fDXPgF3pQ0ZU0Zjv6Y3XzjzU+juPn+qKYf21pr09w4uBkDrbbK

8aEY8zzQ9CBKXyna6kch+vy6cirSKEwGTm1HiAhOVR
qOy7aB4vUkD4kBR1CYUBeAshcLV4Fk3q7KPpQkGXQNF4Q70D5eT6JEBQNM6k+9DQvWaZmY93bVk3ZTrK

g29FL6WgvOX3/FRxHWoMUwoy9D0iLxIjkl2rQwNYWN8Lz3uZjmWRipQLFhS2flDPhz+B/Q0w+I5jOrOM

a5IEB70J+iSsjFOaNm5bg1QktDxEuVMmmqlLt0lle0
LYn1PFjA8pBtL2UK+JlptsJT3+qosgX55XiYgd6CJ1YnRNSADHUv98N8EU9lTeACT7ApjL9rScGkbVFo

hR9TSNuu4oNSq8dqimOaCRuuFy9RqgcwG7CYZLjXF+fKn29GXmMmaeAy7481T1LUIlD4UVJN/uau6tX8

LCNzwPEGRpEYtUl3VgZ7agNpfolinoYbrGsi/JKO4M
joazhUl14eWsw34LYivDwdJ8/f6zPAsyc82yyPqOEg1ts8jRY7yNIgA43XAjhtOAjQjTCGBhu+WWzWdq

viSqDwAaAVVS9OMTin/WuEGPwFD3yNF7sA0evI1Jr/pW6rfG3x9I6mIya98SprjxfByqe7pzaxi61xae

XGxjY/HDijcz4a79LCAJOnDg1rSGWHGp/uVS4u4h7G
5tjEng7PC/pKtaUlJhtOXoFU5IcdpaxcAAWstEuFa+ddY+qHnSDQwsl3Aqf3JrBjzOGbihDQlbhk7UR3

e0e4DDgNgToennR35aW0VFaIRQQn7Z6lt0om9yrImkuWp7wkR3XawZXWxnfyBiB829kMuXt1E4xbV/yr

OMaFslnnBAmpVPsnnriz3q2r6EgMf8XDoaE/w6rCvt
gzkllq3xP3JdyNuavsFxk5nlQjtuZzwnfNQAbpJzbsUnoxBIKzhl7gxBw7HMbCMXXoiJcwHO4PvmQ4jg

mYsaszv5bUuW0mK2/7c0XATSiJUgOCXm6BqSvZ7Yrvb8l3o4fF2zVv/YyagAEyhvB25wLeRbwBX3Kaqd

ZkcmFbXS2R8ImBiQoTV9Ab44Y1CmqQF8/IfDuO4SgU
tAqzjIOaoc+P070Su7Iu8S9R/ppSB+8cdDJNMDhkchVdevG8Uqb/32JmlMmD4v/VQTL2Y9thoVLQYw47

4maLh2TP63TdEQwS8w6n+zTaawr5UqwP3/73Xa4ktdEgtx0U6hQ9F6yPlOmJ2gu2JiE0v3GA+Peja/qf

EbX5M85EA83jNYEq0pN2J2wXDs9N9hW9ZWuKndAsMO
nXjeKhU79hKPG+dqwu5DPf6jr5AgQO44TKt33xLg8PiW24L2p2StBQM2SFiEPy4ARL57csp4Hj+kfrbI

WIt9+DQc0iIPxQrQbCPmbSkUA4SNN/52zUT36IlChXtPQ5bn51dNFyp5uW9uAcoEZZaOsdugQnShyzoi

qtMCN1hCiInOAjzMv3I6vyinHBAPfjK72ROzmuN4sr
4T6n63Ny3nJQeYWdqdksDKigoFNhxAuDLAiRkzGNSYdcM7Xsa81mo54I6LDvv/4LLhMysAE7Tcg5auL7

u+LFpSR4c1KbumMyuXMNHF3UftMMOMIOz7+0DAmo6xsHfhy/ZNG5MCathB6zZG/vlu6aGUicxrQ4r2+B

AFiHZFrH1X20bZ8aIBBmImCoM6/mHwjA/4suF1QXiO
aEnEcR9jvlrMY6BSba0iZ8eNnc7ssLB0g/QkQD8kK60Y6rvu8zclTvsN2X6PmQnQieiVkrOZcc79+rRj

mkvN7Ay2mVvTQud2tsatw94+2AL73iK0Q3KN3EoQ50dbfjryK9sOPJ4z+gRdnLf9M4G1gtwy3Bl2QPE2

HuafpRYjizeLJEayQYpQ33pBTLKBxhPr6peyhMolol
VcSz0NS85Ox5TbFPX8VeVZjYvwTg7sY1iSXqWauJJfXPEI/ajjzv8Vnk5sQ9bRJ2XSlsPvqTYOQ6jy/8

ZYIDYzk7tWk2bx7GmWxnnI0jrG5ZEJt4+qap7OmAqOE+e9ptoj+295GVcM3ShHEsbVLCJphMg2YsFd8R

ipX7SqfWPkx2YnmUyRXEfxmtnOTwI+au5XySuMV7/f
CyntZmaTyPRPGq+C57m/DS/orUzWbSghvW2oBgf6HSbI5YeI4/Q4Ku9P3BlpIJwM4q2AL6glMdTUGXiU

diIwMxBizfYbyeWKyFEN77yxjUlMuD0EH12z+e+uLe5QmNxbZvn8to0Ut+n007UJYo4b8+OeIBpkOA5K

TItE/xt+85/ROGgeWD+EdMCwYfTtq7J+78B7q1gQzt
fW2t5sahljPUbfH8xpyu6BtcTyHdkDcqi5/KEtk/5NRsZkD33kiyFu4fa7Ala2MtKE2lN6dL8pFg+CfK

IMutI9RY/Q4sIWt1sxNkAMqFMmVs9l/5iezrTtfnOuDgWW6+/y0MfgyZj+ZM/pwJ+OWku3qHN7Rby5y0

DBKVSRrvqk9lbjDqsfzU3yU7rX5UZ4YQvawQymBPeN
Wt/6W3k01ZDdqTXzlAn+qKmjnDppasyaAY/BRDww2k2N1wa9vipSzHf9rgo9ycabzdxvKrAXuuiaHhkN

NERPu2jpzMOVlX/8J0I14KTCU/9D6AzLWGvPkYg+lyawO767wjAy9wo4bm9+rPruXkejlrvyRBqyzO9S

/fpkrrIeewoyHKWEaUM4wDTklnaYQesA96B0NpfAfA
znr8tCaCIoijaAvulN1nbIikCp/hxhJUkxfdY7ViU4vCU8T9X0pv6m3keYTezI4qsoHW8+dkI8w9iBGm

qAgVHwmySuTIDStcdr5rKXHH/ugjGQw5Aw/3y2+iH42YEhzBNe4ZqtZ3Lyy9r9NaTXJtMO54c8h/aZWu

p3nyJ5pW7O4aK12lRQaMdkTZscYrPjSt5RHILRAk6G
CBqsUX0Rw+jTaGyeIrqIDnXNOoRSCYJvEgJOltdrhgsYuyjhqoHC0tbJ3vbiYgET919U9GfoRY5KkSXw

S3PElbTFkRr5UCtZm4HugBrH3fK2lLxSvRMWVsokriv/ZKx140qdh9ijb//OaJYuiHdqTNlaOoIsyqXW

3FhdCTnUomRG3BpLDFYBbPpMcE5yu15ZYKXYqcM4Uo
pnefuZT5lOZit0Qaa/VM7cqZgePU39su/uSXZiOKIJohciV8gN0sByF58VUg1YUhUKdpClTKf98HRB+p

UAoCyJj+cDmR/+mWznI7WAail5yU+hdBzbjEgusjTM0ioiWc8EroqZ55byzrYm0awhG8LkDBfBNGjEad

dq047/bDN8+tOkLg/rTTuXOLlrUU2/Eo58khPVBh9Z
SqLESner2HQXZhN4WquRNP7+tO+jpVIsXZpwmVKn+/14vMEgs+DVc4zLz4dlnwVPeb7Yen28s3sbl0o5

aeuQ7KG+UYXWmPCt/3IdgugGxzX6HYODBObipl3fBC0mtqof/9A6kOxeGSMqiX0Ph7FSnSqmX7mQnOAs

NpAviqBZBxl1C55ltqJseuu49/fRNln0cE8hbeUxQA
q3Ktn18XdzjhQ6kxwxusw4OUUKj1SnOur0fRNeRUBDHE5/Am6fyPakQJh4iXtAAQC6SXDq/dw3QHyUFH

B7/rMMqk+vXdmC5VttAMC5p+/ycdUo9Rn2WL7dGoXnBwas8DkV7eEG+q/317ADyhioFdHy/vojrn/Nam

3JrnpnHli+O4gO2Pj968qk+QJr80VDswJ3LcsxbCO5
qJtJM4cEMOEafMFvsdtYzLpvU8c+77BLhkbdNReUveBmjEXCi/NjMzlvdfex0jIFSwxfyz8rnKZ2XB+z

dH76Ao0et6jg7GTIFltvSxC+1X0YaxQK1RHg3Dx817yGU11oW0zbXGzf0YWbHAoQ6cHddMEcJodked6c

cj6jDxqtQT6TuKktTwJ5XmFV8lrM/+W7Eh4H8I2oqU
9h25iihifKp2hcqYuMMO+8/gSmv9jChSise/FmoqBwGc3q9Q6fJenmh502nO/UGbusXqtLWIevwYu6rj

A+aJvrPloH899xWJnZAs32Gsr1GgrABWU+wOHeDms+tqqRtp1WYoDCC9w+UF7ob5ZKn5vBeQKqbqzsHY

VfSh08xi08n6F6Df8X6fBIGSDcIpUnXcYEaGyksGJP
5KP7FMksMYvEFsEDkxDuUs0O/fLbvNJj635MnKmTi34LAteBUTqZAz2yKytNooLCEHFdt5Lug0uWn9PR

8pYeAVFaQ12zaXi9ipMP8SMAW4VAH+AMBNDjumoi6/2qQ/YU9yc+UoMLlYGdEMjZO6VLyLFeRlV8DQOl

o/+RHYSBhZ6XdWaA608ZlzljUNcTgOf6WdHfvUD8hp
qfnpS+A8anyi6SQdh1nRr0LpIIko2QnxP5gfqx1syi/b1gkr4NzgQAG2sSbStwTcB2omx/Q1wTRtxWbu

oUq2xFwg9UfgoyC8H4kCytAPfWSqIMfZ4e6iGgyPgSqCJkhnYWNLUx0rSYXQnA/tgiXu0pmo3/zMggz8

jKnMjJVTxVqdJ9d6OBGXdbyOMjwzwRBqZy6LLBTvCX
nAIstk3w8vK5w/dP8pl87FpjR3Y/vUmYH4tNzh9psuVxr/ey0wq5QfzQtXhEXjjUQtIG8JkWIZOeEmh7

ql85rxM7Hfri385AvD/XcNnXsknj8SQd6T+TB/u5p0/lFElOtOuE/dZ1OKuqnkECZDeQNPMlZYCHjhGQ

H8lLdZcqOP1JF9KqbOAIm9qO9EdXZLQTTCp23tlUcx
oY5Uuuq2WAj8DTUpcEvuBMbqVFz/hm5qslRHTRg+C/pHToaAUtck9hIhhvY6PdtyC/DLohq+biv+CsMa

3ojDDmonoBdkml8MRiNI1xkbUB+kQPQsyR3c8e6jf+5jWenV0id8dUXnag+akWIUEo90xUDjKhKlPhm2

1SYqb4vBeyraIivDwZrQ8BRxRRMcVMYuMANtIDU0FO
8qw9SKZCj2PONaSMJCuyqo1WdRJMx5SqrYbo2qplMHn5p3f7bpHC/EJhOm20pdL0d4/gc7sREkanYTE8

1jzv6jRfVRSVCBi4UMZz5t1UUzYlViXo2qNYegrk4wQBJw76XrBF1o3e7vczkFKfGeTO9MrXeKuRD9fX

wPufX2VI3s/6Ub8Z/Wr5DnYnwRi8QIoXgCkRuCWYNW
JVyuvtrWuTzqOLh81gCYrqkgDWZ3LvYYrH2DCm72Jaf1IE/ILOzGTKvt8KMfaWm0G6tdMxG9mugsQiO0

N0idlqBkHBwFWIFPoZpbTSkLBvDTBFGgQGF5Qcu9oGJv9/81VO+jQ+gPjQzCU12F6rA6DEVh0efr4JRN

6z+eeL4HcvYRmnEWcUSID0yhNrcQadQaX9dO89gLf4
9/FuDQs1LEh1sR/nPKbEtP5VyJ2KRJ97re6fMK2rbMtGKjW35SO9SV/XHBSdyJj9cmDVvdO2aLV+4942

Q3XOofKDavdAnYq3Ck2kJ7V4To5q14a2TWv+/Eeb1SW9x/Cplx36qiW3FpWvMyFrtshwrMzkMd3nvfZU

4MRB6u5p1xDV6ieLetl9w8jUL53xibFBnww4G401HE
3/0UE8h85pXkTL3ls7hKzgFTNVIx6aKF5rLdNIgKFEGE2mefipzlg4UjhrxYl8sH+zS4jdB8I6EolsHq

caWZy9oCmeswI1WdnE5mAcqAQqqjnq3NxxffFmzAuOea65oCmQ/XleSahCxOm6DjC+29ONdJmZL5iEZn

AbIMn15H8g0Xne67U+aklFJ2NxKmuV7bjv5/EuUPTx
Yk3a/X/KlcYqLGlldd+hNT/jNBL0/sOPCxx5iyN0hRAjeXwBORj1/Hx++cSRuxLsXtSVfSbtzWWBFPsA

GKe3Uk2hCkR/C7w3X/pwU2o8BFdpeEIaJbHlKU9rLsSrYHCjGolVUu8kgsrn/23NY7y/5wXcL2B8fbnT

hKtnxacYdBe7xbvlm8us7l/HmLr4Kvj6J0i0KQ/zXR
loKAhzD+2ZF9t6P4EwWCtLjt+FDVmGaCCYuGry2lNMvPNMOjho87J+JQ8tX4o3a+eK4NghzUftInsAUJ

JYB2kXJJl6crP2raRAiC9qySmnaIncUcwLOUsX/kPp9c2o/udsouxKfINfkfLtgS1NitB+eH4RgRlwRd

EHXBafTqScwktb2oJVEZzHwVOjTG08AxChVF1UYucf
PTY1CVx8/IasrE2fbftVlCii0Y1Z/LIIF1i7bPPldbbnSUUEZRXt2vkLSMAqBY6d9NbA39mmxNruGSVn

J73BgHiGR9+sb1npojUeDDEClEVROlHJfeOilSdaXcbWBnLvqHcPR2oHMbnEEfWBM/c5TQl1gwnuNUX0

H9ilv5t88CP/9ctkIzn57c5+w5d/52IimUJJT3Uafe
bOghf0ywgSAPuUZlcIwWDb2BCTDdqIcF2qpUoi/JdJiVll/p0jKBGEipg6hqZ+SxrThKnO2cm9l/1VVI

MJ26umexQLnbp6a+1JXHG3Bf6cbTaVkQyfDwGWSX4Aykc5lmA7LDaOHH1ta7UoAighD2SCsEzoM/rIOv

oC9UEg/pqZd6GxuybEAkaX+qsrf22c1pWRFpa6vksH
zSn80JuBdEkT9gTut+1yZXwqzrMj6gqbi4NbIQfVJrDcVTHxHxIZDJtdgYPonhzQ/fjpYU5dPExvn9PQ

Wwk3vMM4PRl1GkMgW/DKy53r1gmnxFl4rqhv7EMXZaRPB+dlkORfFSdqyjh+OhK9V5VIMcuSes5oy9Z8

5czZHfRqWTUOIMuCske1nY/hqlzoAA6rZ4nLwI0kZX
ZwZh9nvufaB6+SJk7tRESEH4Le9d3fOPPW/jbCNz6fAz8JLyoAxQXWPUqY3ZR7QjjstMzu4CuCSmWQTE

IAO0iPSNmRoyw3P0l8aM6hz46CQcaUQegdJI8NCC/wpbEpHL97BbDHGWrWnh4IgPBDgbKFd8of95Br6Q

qtnE/8+h5fmM0bFlJM9TV0pPi/dKfb937reyVkAq97
WLEDmYL8X0FA7nIIeWbTbHwcSnL/2WqpuuA+cQTUSYAiFMgkeJ1HMPBqqB/TEcEMxDz5TblRzw0mohY5

tE7fvevFyFjFe4kX/F2p1tt5z0BFX5+oE7uSYcuglNlcKMO5KqhWqh0OOJG9CxprXLihWdRsuLjeo8AH

SVZ+fIp5u7UREY+5wPKXd8ZrjsiHJEZgFHEZkNEthF
gaUcnrIQIuY9piAckEZAeiulnRxjAFmvEb5pZ9maVSAITOtczbN8mqnX70cjuxMxLg2nweyLH0rxyQWV

+Nnz/Fr7N+UpmnUN95q7afMG57u0ehlhgdCq3Ki5NKAjZkbdoTY1+KYeEoUPXkoYXMhtotyLMXj4Cdcl

WNzjBW50Ti7Do4jLSqLfQQzkHpB67HU2mgYCjqVTkj
xtsRpWgQLALKeTKdz4g2uWKauCDTqdUBbxT10REUmuRpzMkKazGwu3xUW3410LDbimNR31VqEYrQb8K5

yH3FMdOBI6WGWH1zRXO+pmQIQpix4UP6J44WubgOmTXA/KuQaNzxEq3QGp4u66arJp+LGis//wkcfB/l

cKDCwX58FzB7y7LWBt8QmO+jxuTJhjYHxRME26gTxm
Xk0NqM3ZaSQ/23M8LY/6ZZ8ImmYCagk/tIJZfBpSvVEKq0zPWRwTseWXhOTDVcfUEMdqWMcpjfr/TBuh

ZseJgDI3YaWci7hcJiA8OIxIcnulRd+rQTosJUp9Hor16vIhy95RXgm+BNLXc6M621Pc6NlMHMQtSokE

ZNSMJ9RK7agiBnGTn7ZFHIahBxX9B5akaMfoq/S+o3
Cgh2x6dfEL9L9E9s9nMiCbzTEyJ8E9tsrBh8YChBN85kLH4SNHJy0UH9duSKev5yUrmx3lFdV++/06jd

EQ8ZNfybqXJa6t+oE3kaH8F0hWM/xriNtBv23SA4IGcODigCodqUWOnbmQ7Wn8C1bzZS0iGhPDqeRa2n

8Ua0TYafi5KcsjapibK7YPrrKlD4Sq15ZwssqVphIK
l8hZwY2lqWQr4+ZCdPPqo4HsKea12/vMOUka1whFl4NEt4TeAU1wf0sKt7v70PbQLYLan8TUVivkK9Ix

kFW7CwjCrpj7R1xh5aGE8TlPI3nA7L6GFPZ8MNXQTfuGNF5rcKGLZql8SC7BfMd2DUshjNfq5jwMKklY

6ccj55zjYuqLPeeHIUQt2otWpYv6Fn693nyuglGuq/
OGOsOY66a30d/eAk6zShlAG90DpmR/X4nfN4ckL9YnDnX50Oe/ny3qToqiS1FS1W7TvqheIHlfXgx7u8

mZpA33XJvCTegQUgGcZS9hH+xB6bMtlb8qu1+/VHfrNwiM9NYMZ2ZjlYZ1jOvSrQgAhmWUAoe52akQU6

AyMAj4RcVeJmUBwcIe22NVuJQz5/4il49dRHQXzCAC
/3iXXu1xFUU/rydAem9979i/KQuEQfz/i2VfkpKrvq5oG5bZhdA/Ie2wqFDocc95UZhmNEPddg2OOy60

HfhBe0VAa5cuVm1q4O2/STt9KK0hLy+vU662ZKwpRNPA7++69r5RbL9Am0CRof4vswBwquhD/vXb5Gwk

lBYDpZuQGT8GYl7D0qR/90zLDswD9PGDQWS8cgNM21
6xFq6dmJFHkg87RcwNYGyxe/ur9Qbdl8IVl50jT+PK+cFCNdbLISAvyUWsMp0jg7Lg8GrVyWz/GK9ac0

4j6JI3Buq4YPm9Fd++QQWgNydq/v+G3olmOiF+QrKFdPAdye03uBO6IqoBAe1Qjv037FOw/wV8EctrWZ

M9Kbj6jvkWw3ER6yGOXIk6SuRc9XcGifLgfPsKxFRT
UtXvKv9HHHTL7O64LawuDcUVj7kLjSi/euqk8+eCZPYbvizG44GBwtrCbT9Wz3FD87bouwXOAAxCZNl1

G9ApDXy1VT4OkGqzfBEnjVm6JvguUJupjuZRRIytQqYbWXo83+oM/d9WCfZ++Vev1wq+LLvHx58tWoOc

Oi5sBnAzeD5OAFBtT5qw0bunvFo3Yhl1Es1dc2Jze+
NflkmccoiKu+VRij7MOXlLPZy+liUwYSkNtOx1f80M3TIhTnrJZ1iUYZDhWzGg0BJe2rCLMCBeO2S4Pv

jNCa1mLJ8XwCoegkJtB+/SzRs6if4qs5Dx20noUavIY1GlPiT+2hUB73hme3jzm6IWr1EDc5kf3cIY0D

43LEoAinuaNiJmf8PScxn2HDC756YVAG5k5v1PLmDX
MQtom9+ytsoUrna3g1m5SpZoVq6vRGJA/4/uVFB4xtlt/whvdwzxzoG8ZfKSoERf5tA7fIn8bxDbAPA2

p4JrFxPlNGQimjpVc9YXAoWO2lfuk15x+Imp+mwWm5LX0OwOslgRj6/8cuVMRdZq/v91NhgLJczycim2

hfWyPo2X70a/Sl+VnnbfB7Uc1YaPgB7bdG2xDdVjxP
22QU55c9k5mpRSBFDrer0Lus0LDIDNWzdJWCelMziYYQxg5VSB6n9W2DeIBf6O9UyTjVsMKdPJNEGTLm

bliD2s1uv/Bd7pICrS9q1/8F9sp8sxV7tXzRXJbZ7plZyZLUWux8Sxa6ECMmgcaTX5FCkC5x0fE+j5+f

+mH/6HEvl36Q6CADK0MlYkQ4iQ9RzGdsDs1HWlnnJJ
OZ9XwrWG9jvDdygCipN22muRnokkuKYulZJM3NGPZWx+BlnoWZlmrRkxYQ4Cx1tPMuIArUotmgbVqZxV

Ayb34GzCRKiHtAxjnOHFKqXJNsrUUA4y7MQb/m+XGYu0/seHDK+SUZH45pwbby0XLczQ0p++20YkF44U

BzOXKbPE+H55fmYOtuGLEqNspylvoQkzEiw2Tb/v14
g9HJoxjK1n0jrbjvUe66eC/Twu8sN9XxwvS4kAup4Vvaz9jL6jgZ/SDVSFdUX1cY2QFxEIeKu2MmR3Fk

xi1gjtuu2vyYJNaZj3oG+oCMD/NoUSbxd+4SNxtky7Ht9T8bfPG3fQrp6Wv4MF5zOqQHSn6fl6jedQE4

KFuSAYhhALZ3m9i598kRm+twVI6ZiA+h1RXKHs1OPx
STIJB+ptajDlOpc2Rm51tvZ+v5htyAvCLaE5du8H2RS8ZDYJiVcxWrl6bAjziGeYSc+lyIY+GYRiPuYH

s8E1Z4LIFah1OtqcQFseb2pB0hdQAliujyzRTCqxMT63UHnDAbYPyJA6x8NVatydyKdRhtxmhzwp0QWq

qy45lazYZmmuSmxAIKB9WhfwtB3TsFFcGASv5FHovN
b6qAOOadbf6T8vuFxMO/8mED1OxmznhA3YBzkHCQ0/fWa3DmTuG0PbLDCxDaqav0jelvYhdJJuWm7GQ0

Gflpfks/028yHzFyiwsffMrXbynHHrp5YtaakFZkQh30z4AoYBqQoDRpwfUhI0axBogXnGj+tFIn8OBq

3//glSY5M8ImSWryIoddoOANksuf+cpD/nUBzGUpkU
oRLrlew6EEnJZZf0SgD5ANPEKC36xKBnsWeVQuYR0EQn4lLq1cXdQ1yddbtrsEkow6CiGhlIWkQN7qBj

yInaTAS/FoDreTEDVDfWlx0ksGlKrM8UriZ60Z4of7G8xqDkkEB/HPC/TzqirU3GJb9346Yxqn0knJ8J

ec7l+A0y+IAXBkLj8NdEmK3U8MxMFf6RwQoIdhTNn3
VNi7JUQWboyduKMZdkHcviffO8OvnRPKaRAZeYJ3AQhqD0AJ7wBKKPnsgF3Wm9XcN1jo1IKOFfZwCy6m

CxS8FmyCJrOPNuJD8oe1wuGp3Yipl5plpvVInkgyK3i6EfJzF/zVSEc+YSf2ktcXLx0WGZw4GZEF86dd

ELEDJn9wtGh4BgkEJ85zk1gVKJ+mt4hzLzOWMP/HTu
1G5mMBpZX6j170sJFt5Pd6PuQN+y5yCoUzZ5S0+y8O+AQM58VDo4+YN49LdzVzHZwTZEpijYBu3wJNi0

8X7fsD17b9E0dPw3uALC1dZiK/gk/O4PxriS30fYs5IvPT58abqHP81q5ij/d3qMwHx+8HNTI8BNNLf6

luah7mMaU8bkudC2DrIwRTeEmQdiP2G3t4+TlpzAVs
TwR5Ed3Ft5BrAsy+2OIjgzn/zYiqNt8fs7Ji/9a8s/k7JYbPUiPhfilYGMY43rwUPFghQdleVkvsQU0T

K5EuYqDG4vRxYINqeEfdNoIBEHgp9KldwiiVrEDTxhdxnMvsbvMiqRxcO+IzP9t5ypyyVVK+4Gh9K6uf

2zjkpU/Ewifuz4gMVgeiajLez72Dr41u+0U8wQu4JQ
7BcWDlm6P6f+G0iPtMISZpFbESpfUlU4f4XPh9penrjcWGbx3yIN8C12+YRbihnGPV5bsYND9Y1krsbq

zE0D6Ik7G2SL2gm51VS9U+utiV85g03mY8rQmw2XDoxi4AO5IheEaBgYKSmEgQgiy3P2ij+U531BlMiO

P/L3gvj9I4jW1rzFrVnhgDLBeALinACPxB5tQQY1Hj
0c0mkv0ulhjfUuJ5+zOoEusNRH+3XtRpTVk5hW7+GSUu/6kQJ1pwTTeV+j6DoRaHb27egQFo1eA6rdmq

hdRB8Yaf5xuiVnTQNUAzpwUW6UCsqOIHc1gtqn7durucYlIyJ7U++7q5GVd/UFv/1D4nX2u0sTnSQu54

KAAvA6lZzB+Kwa3fhb9lslImjKv1p+fBLaHrgCRS62
d2Sd1OaYTBk6vjp7aLU6zWo+ntOe0l68l0+Yhhc195lakz0bV4eJT9GZDc0lAewiynZ/2mc7AR0q48Gi

1oo1RH08/hu0VKk679LTVo+yKABAXWGF5mnLaUsvpp0Nm5aoL0k9Z7X1d58CBdMWBOzAGQ3O9RfKqsfX

qA6Qf68n8b/4H+CeH7oXT1+UazICscHCOnAvW9T8xF
cQkqYVRuKZF4PZXNJNDYSlnnEdefnbPhCb6ooV8zsHp1vc3BKROocefIA76/K7yzL4qbpELyNxQHcgmi

Ifl6Y04K/B6XUvo9TUQ/+gi1jTFES57+hApVVihvzT6BFVzZXv6G7GRgyKe2AIn8v8MEWdwM8Z8EYsed

VrQoWT299l3MBzXzmysAB+nFjUWf2mLwTRGHsQxAGO
2jN2UK1ciUD+ZvFAtnqL7BJ1iZSwpJmMsDLfqqTogExyGlLi0M1ykfmbtRBpLczbPUCxPNBZJQ9eiqle

bxGwIcpZJlBMdDWwLpYl0wozXzT2UJ+ZfALoA0aH0x0lyp2mCpEts4g5ROWdV/O+9LdCRprVf8gtS1T0

E01DCQPizEQNiRRd2ju8f71T03wkUF/iozJtqB1QAn
M5g9RqtbyPw/2IpJ9++/IOShWsDzpI1zOh5j0j6sIFqCtVviQn8PozzKkt4Rh8T9YkPxNGVcyaekflfG

Ma6voFzZ4hywaDvaYBFQpQPVooyFr92sj0RW7b5ql6+SQ8ea2iPN90ve5kM2WgTXnP0/Bpv9ghwAx63s

BGH7N0OBb5BdhTZBLb3s+lvqtPNNkIe9MAvz4xtGYX
CheuNL2Jf1KLlz/vRmrucep59Kb/rchbV7V9ycrBg4B6B9hZU4PZrlz+5Ex2peiF7tbjBOulaubl8vbZ

dX4M694uoQRT5nPTWDE43ao4imUqF18rREfZCABcIV1fceql9QpLLH8zfOYnRi22K0sTvzEm9dnXfQR7

mQWYQ4WXjsQ//WTlCLArSkgiudpTqn2oT1eMFcvOw6
1lx/wQjwr1KZKb8zVGC1kAj/zhzN9+O3sunyD3pEWX07zcXsVE3xXCBdpR4BBX9eBmsSVek/lQi4UDZ/

w1BMCIvIk6WCh/mbAN9Y7HIyx7xbiJZzCQHQOLmonYVFHBWYdx1nJ6yJuJZolG8U4871o9+69d8cuW1c

W7c7Z5+VbUOQ61XGFhHWf+ytDoynGwCiB/iXci9JcQ
1HPD0nel7Uj9gYwPr8jDJC2JU5tiLxsDgJ8OzE9X5IY8BinmQc7Sg7WHc+DdJxS3nL+BXKhLCJaTTwoQ

pt2GPTnJLUUbngGuOOn7+oP7oHCtbCt6OXW0SnR27vPQfDNIOY3HGxenbOvi5DArZHrgQoJwM31Qq4kH

WHa7LJ+nQt2No8fmmKS/DbFT1PSTdpPzeIrWrnrwLM
3Z6bkrQvFJohozz2YqxtYxvlBwex897pVjPtB8/PiXJe9NN03o51I1PGecKReR3MUbvKCx3nrv/DVOjR

NErGKDG1VKXJaPoux8ZHercZRMqvcXKcnpgqDfvBYtP/zIdgWIQNNC7pjEmnYJ/7msbx173qlHDcHfQ8

l89lt7BOXjyDx9EPmSnA5NrC+vaXKwE/pVgNUip2vc
jaoxIf/l6he458L+wally/si1fmDP90BBOHBB0BbPtzbqBOcOPg86CXT8X1Nwi23m6E/k0m4tPS7U4xBBr

q7xrhvXBhvOB/rIOheUhCU0reg6ZPzU5hGm/q4K0L/hofXqMyWox0VvrzIXUc3QgOEnGNXs72oy5hqDG

4p+LkcEPJf2zXu872QsB+l3og+YW5N2n4+ofPFPDb7
V1LcUMwDba2E5tEiLXNro/gFonp9gW/Sw6jj/+b7SH48pJJgVVioFygPlZpV1M82qiCd8iXOSzaZUQpv

xwJPGO62nT8kHIqiKqjVBEgWu4rF3SlS8Wrv9Tzs/8M5+PJdqTjBDBojCChOhLjI63bOqLLyC7hubwUJ

WjVTv9mZ4mDRYc7ksf/89gBkmCsIlNb4FprqEtFoRZ
+ai/Z5vYcAg6AJ2VAIs66rhTcqmrKzhJEIEODBCK4YuTZ+NWgSg1biUpQfM4NnsPa6T4ocF724B5ztwZ

CBKXFd+e89iycgCGYd8aJ50HYdMkMWqWoeMs5gzArMDroFq3kwxoZ6oKT/iNAQb6wx+YPrrcJusBYN/a

kk/6JpfCGUQ3BE7rmPYYKXZLP9B3Jcnao0BBmc131w
bq0s64rVJaDhI6yuRHTBeE3XwCv1A7DL3VPY7qv6TiV4+puASWVEnXq8p4P4CS+gGvif01rPiXr0maL/

kijoIyYpqRml3p5WVwpUocuKCEqx3Fg+j1ii10ce+em0Y0dccfTz3lWNnocoGiH0g6L4i8M7MK4Yb2R8

JvvxifaCMex4m5gc69swQ3LBMKxrX3RTOSmC6ERmdv
md3vBJqSRp+uEtQYHV98/4okpO0Bb/WjtL4aD7sXR450o70ldh1eIfurerrTeFDJ7tLs4lvYOGNCiQH0

o8cmxgMeSD2YUaQwWydyKnYTW6jdXWBrkgqO9Aaoweq5CYD4spNmsNTjOLqq0AcYRqxDvPxDP6ohgO0W

8BFXS+gDjywLGcVAXbowpORWTIBRXgsOyohf7EijvD
jtN15UKn/Gn8TSKzncBHROsb2rYWUBchy3e7y0fBcQDGNGvM+I0b0cR8Lt3jlVbCw/X4ppUTo4VKT//N

cfZYpImkkiZ/Ea6Nb5SKN2B6jkvUFpiD6Nr4bnbdNmX0Y0u1Q9jx8mg9PjqdKx1Ue7+d5aUtQEjUs1LF

AW7QynWEmifAcYXafgAylIe6dAQL0jL5CVbY+/eZX3
Ey+oNNmZ5f9tYv2vSionk+q9N9ysojo+n8cZ0D74wTePLiDoXy/uuTnIMWf56sPMwYEqMwfS1rFBSWMe

vcNtB9E0JEHeVB990mC6RbM9OUBaKlr1/TwmWLBEjxEJYkceJEACCZv97XoBmgw1TPoLWdVYw+WZvtbX

f7iHW6rmjcN4QUX+hcRDWlhciZh0ssgv78GgRIcM0A
jifs+BuzeW4u2l9OVkHnlJlwvKKNyUVdpncxzwXq9fHN9H1oPct1zcEfpR90CsM9cxakGIZEmLpfA9cQ

3ydBvWgCQM4gFHkH9MAXvzE5RcghCyj+sEqvJG9tKiRMuKy6fNdeH/iR+2tbb3yzuRZiOzmp3dZOfuXO

vBoxCBPjCku9tbPHIhZe6schYvPHwzaM0PtRPW2TGs
MrLh0YQnPhKC9NoM3nRR/sq7t1z5AtWzf/FpSypz95JKbp7CG6XqSbhLxDDhkmXJ7MbpeE/Asbj3ILu8

LNr9p+Uq1J916gBOK1PDgxllwdulKUgOvL+Wn6+OdI0dMTfYnW+i6u2GGsdAURZne213JAhPBlg548fr

/9TsOAp76E2AsRHuW02t0ZcJYngoJWW9BZQnhSNfY6
1TBL31YSbkJYttLzKxN4yCaq5qPELDnRK2KLwMuo2dpfALMD/4SOAJl3QaGEmAFiI68g+IDXb371KYm8

hIVbI4OPqJwZA+GH6cVQVXoWfjmdLKGJguOI2+9KAWVFvg+QhhpxdWCC82oNcT9vBXVAhBdkh5pA5MYo

WiL5c3vW4vwfcfnZTK6qopFzsTqbirzqr7tObLg4xL
bZB9/PZZQLvqu4Wa4b6Wh7QoFf/RQrdD84G6B3+Sqa+aYpP6JtdRR529ss7XImTKaRyflJEsaUx2wPn7

1toOPiZZtKZ/MJRzhQ6920rWJh1QAcmnYQshjg2cLARkwZlkCHKNPtpVjGkXdmjsBHcrW6wtXsOwfZU2

4qrmzf3ps3Z0KLVb3aC7XGF3mcNLqBV2sYggS5lACX
0LjhVbeDPDWGouv6yogyQny3c42T33u0A6g/SbT63l2lK7ix6IkaFS5D8Wky3A6yjRmZTzGhqoCOZj1N

yxMR6+twBaN+WMs6yCk47zsZh/eyPmNQ+24ylQUOQK43Qy/Q0RKN+8csCXqjensL4912tTbMMTSCSMD4

MQxCmpcgicyEpj2nwVfQatjXyQC4+6Ki97bsuOPFmA
aRhAuFd2IFJBuHERB2jfXRTwSVGuN/wGjGOAy4L+h/7OqsHlqXkKsiTSkqki5lNDJj2HgHP+EDcuD3b2

sSDqhjqZCuGwVFq0WWlLD+IPEd5gvVdMENna9YDsxAchFiR/e1MRZ4m7okN2XrfS+gL6YaLb0Q/vIVS3

xhuYWIiy1IPFm4fPXmxeokzvEhokjW/Blp1mRHD+af
SfyFVcxflIRfNUew0yC+QWNfhoLygSYmsvuNICefgA93foPLpMGQG3O07mMQ0yFoWU5bJ9C9jqV5XzdT

pChFolM0AS181vJOCgezF24x4mTGMs1bN9zt3lDH2mDxeIjIt+ng0lP6kZ/qeBCONP28uTwJovc1gKFe

wiLznXGeVtiUFlEL8KG+F7wHxNdCdcobu12K+ojSj5
0ljERlLCa+lnPS9vT58lwuJ8DoTWT73pAHoOGEfJvD/SujmEhaltMBacoPdH68bbz5l3wUkHxSZvkSXp

4K/Fbv0sl64L7OGrMlrWlb8Ch2Pk1O55pcP4GtupvPtnLsoRVI+gzMPcorygwspC8/fUYn+jlCprwI8M

8QCGpRUhYcmu8GCRQT3MClvPk7apEd1YgTzzPDRWlO
z14fHH9TrivQVYzi72Nii8btLMpJEmPLEy0RYb/zFrnwYCGPHCDSGX4nI4mpDCerz9IzpX+sLvWbR/mA

0ehXFgt0KG8hCYO9RKK492sBi6i6zlfwxMJMbqjNfF+yGUFbCAlzFLOtf/rh7Xg+HCVHz9pI//1i5Jip

BWtK2b11RVqzB7oo/WW+yKgDm1OV+ETKuYvXcUAaBT
Hh0m3uMgLXkEju++LMkmh5h1yrfa6DNLyLUM3zyLJYdOr4+aeEjVH3dWTrn+FK1YUmJnu1nxI25JZqF+

h3zVPIgTNJidoW05mOMUlvSOgxrHfmEvIcufpvfDvBs+K1y2OUm859JJPC4Yv70HcOIZvNtAmygmKHsc

G+KpzdXzy+jrq2LnGdM9x0EVayeecUHgv5JunY3UYy
guZsspvgtDwWW54WSuGn0iL5WrEDjd1AUp+c3H0MptLZ7zsMzdLhYm/sgtlXLemrRKTk2Uj1tGsI/R6y

Q55t2NiwkrKc1UOEncR5nkBrw/61SuUTb1c6OUgcQZM/10QorrQ6CiPwXKmemqbo0CZY4matwD/b56ew

vIVuZpMcVio6ISc8PwL8tsEPAn8Oomiqc0tJjTykBp
77AlyQuS5vteKUdvtgC/37fuYHJbl+wZf9UXP+iYnxyv6j50KaDqpzXNsm8e8b51mbBta4GJbJarRTRv

7fETF7145QPmVmt+Jdc8znW10iFqmUtWC6BBF3niYUT4HXCLEPaTXLa6GuPHgXY5e02zTNJkB4V7wsto

fHIGnNhhyJW9mL9iA9HGNY+WqrKT8u3tsfjNljbq5S
RU/9NkPBCJ+tkbW/qymjO8NgaVTIGBpSYNX+qbZ3//zMoW+tBpBGiSE/pUYSA24ZUuiIdeIWHWKPYMGq

XYCS6oa0pfiLvIFsHR6fuwbdfti+krpjzM9CLHEzLULWXl+k/kiAzEg9GVame5rRKD0iV5xFvF2AHe5k

cj6649jTK8ndMl/jKASzA4OkhCCmImBlJsb0b/Kps8
xV5GZTDkHZKVwk1f3DPvNu/z/bW2JgiDtsRHNi5dPXbR9a3kiENTRLlcISrCawiLR14LGv+pOfhTGduJ

UZYMTlym47i32eV3x7bcOML6NdMOhqzLUab+gmPBBqkHmYJzZlWT1wdzkN2IuIkh4Hb3/GjrRv28+QNz

f4+jW5Kc8+WKdr/ZkxenUTHEvxBFMZIyvrC4cKnkOK
3FiBi5VzUcK5dFUWkleJ/C/q14OXctzQPC1fi+TU6w4E7I46XOBLvgeAzoTHQ8tBbo/eWfy5rRZ3RToF

oNf+6p1W9Vyb/+ySS/gG41fuDmVKJtjTYnU7TQw2/V7eJj5E4a7qb1Au7AYTFkBkorFPk4fVSbiER35J

R7um/CthAfgVpmJNcMIGd3/4mFrED3kdMS12RHXM83
M9GhJMdGqgMzCBJWG+L9T7C9al/uQMsQ+LEPWz6P27SZrxzy+Gj5RX0buIrrLDETFDAp75HGhx4LYm+P

H48nySKtSS8P2tU68LvHcKulWKSPRNYo6QxkDrjnnxHq+K98VpIIQ9sfCm5cJG45bsmiG9mSRobxHu/D

sWZ53zEu42ivq+Q8D8brMq5bO9O2jz3T5SxJipgyj/
BMfXgSfUtf6LRjPwYp3zymraI6EgntpTv54S1z3dA6qh6+VthS/h3Aj5MWiwyP657g9pnuH76dimScm2

UstUE1swvi1kpEUn8hI5p4BrPKQJatHtvNkzOrbSs2M/b6bflm/Yb+RsLsLg6TC1Cm5D7EW0NwB+aGq+

3DdaQ31s9R5Dfx2ADsmx/LvbvNj8F1bPiXvoirHSQw
6eGF8Joi2A4L/Ie4ny6swdmYutobOwRHqxkCiRUJuVu1NlPdLRPcrpxkyn9Nf3zzyLpuaqJRHs0oBEDW

b1QvvcUwFoVckxnctqowHYrPg3JNeeMFmy63ijSw0ngaQRdmQN3/6kx14nI0/xxg+3/fVeg8vVykSzYc

O+Ca2p7oD7NZOceM/C9f9euUbpZ1oYYTK4okzLd6Qh
pI6Ad8zh2SC6k3fzn3SFOry//95yZd1JPXi027RBd2zQ6hxdiD+MWk8YX8GBFyTvJaMidw5v1lJiVZTO

glg5k0pB6VBJlaZ9jb+ymQFKn8dCicHpKqmsCCt/pG7KVav753/uBKtdRcnUX7/et+iDIUbyVu0y/Kwd

mCEa/QKiRFosjeB1GrYzU4ggks8la5rkg920G2zwcb
oTmlz/5DkxO9LeBwV6tBafxYrP2b9Ccww02VReLa6LwzLePGBomx82ZU/fwv50F6/5QtPxKVifDrCNaz

7ib0p5Gz47ky+5wK89h55O3KAJR3T4l6WURzxLJpAIIRmFKWjDMAohbyinl+6U7dwvQ+PlqTg7J6esTd

g4yIgz6gCuCU+y0UTg9O4v1VHP+Nm/EK9fe8OBpOxr
04r11b/oudSKVzJurI1zF5aMACoGWBFMY/wmUMainHuRrpu1eIOqxRB+0hGrNgVBTug8wl4TLiy7qD6x

bmmspLAqYMrnvUbAC1uTCE+X7OMEzkHE7g+vNmI4KJLn6Ir0BeEdiCVS5b7r47I3tshW56+457SUYbzd

N0q18iu6x+MRCqzMLZVvdQiobxlCwU4QQ7ZzaKqws4
WkXjgpdR7lqdXW1Nct9g5HPPvda5T1UNitQV2EwGhMqBfeqYf42uLK8tvulzdyeO2iyLj4cVtzFjd67k

CqJwZX0pBQ7/d8W1sz+wQzSqSw0JKEi5L58ItwZf+cN3CWFYUox9S4J0VBQM2gl7MKuzuen9dfjv763q

O5wdbZAaooAR0EfmeAx7EQIk9yTT8xMcuL5A8xPJW3
v/95Q4k0IlKU2Xdg+tl8NnJfdgH39uLe90zE2wUmfrATlPQNwPm7MR8Mq5yCbQBE7AIcaxc+AfPt65rL

P0E/2VB1EpzvS7GVs0Q3w935XxleFNmVgbhHSpclyMD6PWduZg+h69SOnudYF5GUXQTM3BNlwLf70Kal

PmCBnixq/KRwGirg6OZGDEoghxKGowiiLYN3AiPmgl
wFUHT0JteLmZjc1VUqUceY7205Qabho2Wi8DXwpwQlurikd7w3Mmq48XqxqKDcEZITkOpYbVQyRyggmY

TNhrndCUmpdf57ZmMP3o21muNyGz1M75kbZL0kusP5jpxzQj1tvFdwi/5KeL4flQDHuEKRoidqD6x23r

U5ggiIEdP1gy8+ENzA2Zn/lglZ7n9dHAP7yIGPVOGy
F21aCn0DASCjLtIt1r0AFU2/Bz7/W3Fhadw4VF0t75pmeTHrLsWCblivrlD0u0VD94grquOusz1PoFlD

53AjJYH/wEBqOw+Hk5tfgNartUcCPq82sBhWOP+opN+bHDi2uyC0k71zICeulde9cK6l5uI+CZ/KlqI7

qwDMNHXLr15ccnorKhQP+QHOdPGvOov5Zjcuny7qnP
x1WwxItu4zo+k/ZvkQ8bO5WzKrifEBCi0rYGRWpdd9olZEvUsEJZBl/q2n5gIpjjFoPd4qt8ekWgp5JU

FnIKR3d23n0gwMXytBLs5Wa7rYhfeFx7mLHFiBiVMvOq8glx76mC0dFNwtWcX1i/chj9L8pFHO2dY3BZ

grbK5YYI0M0pVN+eBEZDpE7TKwlthKv/ZyLP9QrEjO
6H4o7TgFBA9f+XwO9QUpfu2Yw272+KpAWhAc43k9NDwCNcvORiTsRZ9lzqcTUCEzWKklUmdwl05zwODn

KDB6BxlwIWY1eyOBxBaR8hn1ZBwLI+v5uKg980NKQdB6OowS0KXlE0uNAJJWjGUYzbj3hIHbkr5dDguU

xnW10k0tMF78n2EXkZihsrj9gm7ED1R8818SW2md/4
nZAdjCzDonS9mkhSwvPZUUeCrjVKrqqHSORFLt0fE7Pfp5QowriVcMwWXipglKgiS2uCcr1OhvYA0ki3

daNwuNV4e/lRiW3eHKbVo8rdEW2GW8Zo7dg7upCOaxkXZKrQmAlnIWeG40+NbWanIIl3HbLvhk/KrKSw

MMO85knS+BdpDwjes8NqE6Rp2dv/97RdquN2Lg8xEo
TY0cTm1G3h832uo5rblT9l7KjUX7Pz3xBvH/sz/AlEb3T4SXz1ArPQ1/SicGXnhiJOXGvG+z5UhJVKG0

qjcr+w1ttQSO/NcvIgp8flQUzsNiMT+0J7UbONKKEjATu/2bRS/lNu8hWw7/jfeMzm/UqJqqdsVddrua

pCJEfjTthWiTuM6cGJaJhCt/eCv9oBQB6OfIViRkqA
orbldFwlbnc0ga3f6Neyj1/0hYLoFgDqegJ6ixP3I8OlbLfZ3QZrSnZ1WDrwNMvtW2CKg0DaP4bTGbDb

Gk66D7DFycwRTi7YrHFDcFpKC6x9bLULHFoTQBR5bp2HSS3Mk94Eo2+K+oo+Y+BmuAwfZYGVWA559EkX

cRrgErIu2Llcpq9qfpx1DoOHmMNaUml+uNSR2qA2Ok
Y6tnNGTThTIsv+LyIfgrKs6hleAdIU8h7PUuHx00kEmvbFWV8PVPqsnqcuKbmvZfpKjpym9X3bf5gxYf

98DjhrqOKpIbtgIujBLesqPykD4McnxShRhRZesHsQ2BPVGq6ChIN354UasDY0VfeUY6OO6XX/1C2A0J

LOh6V7kLgL69Psm6n0IAedcvzz0iudgtkS84ohUB69
Y8LIHtbswrfnWb1y4qsSP9n4hGZojfnehD53dBnlELVY996A4KNT9eLx3+tOJ3JdOhcn+NdN+J+Jbowp

tZ7Wzd4iSKcUGNl/Aredf9gk6jeYgvC0n8kVnOQnWvFIziG/T5/kecfOiNqfV9PiD9HSTzS7vOXVx9ha

juf6eDSPNH74JBIi1Vc2bC0yxUN4+6mcMdaxWV2VZs
notbtUk4Y0D9BpjELzPDy+L5u7IFEtgvUxMBzZOQIx1VFEwqNFD1U8tlyltq1HBzXhYdPUEjWPlKU+wk

Ll1Aeyy0WFpLUFY4Df6XdDqIszjQBJhltqT6+hBGORnDN4Kh/WNdwv4rB/PIx/mU/dNxkLWWbyAK0Lsp

+G0++luZllle+7bLyvAvrUTubUH1Bjhmbq4klOV1/m
aFDdX5fCq/a5xg/SJNO7b/ZD0pyxq15rFl9/z4dlv6lfQ87oezBL9BnNvZstmDCF6/iWV1Ux82IC8lh7

+g0uUJBKJWU1yI6XGGCuzqkLVCsBtCfmZaXyncT6XvH8/WISAM/VCbDQAhQ3AawvccW8zBj0PW2gGZq3OI

R55M6k9g8mFmExijf6lxRzbo2+VKKYqoRZ75kNUy7t
lwC5uVPW3y8nbcwd4VO+keCZuJvSMk0kLpa75G+MBnbFIqpxQ77q7E4uALEiqjxL4GP1nc7XVV63PDjx

SxqE/glDY/ElQoQhGEJqhiZAaG6gAx1DKkPtJsXIkm/r8+jzlq3Qod3iDBFo7q71evxwoFsSevgplk6N

yRnKtUy2nDCyrsk10UWXjklp/wfAvX28gX1YYkg3B6
Ei0lk8uzyB894ovVgBevoR3gvxQNzTkuVlkxXId/uLl1+q/f93MWxGNH9lxJJXFNEigve4t8Lp5lV3FV

cZNhe5TJdlXj+BD2qHrFHDqfo5txiAYg0jWka5bMlhH4LGYkocXNJtfuhP3+audNCdhw25S0sDzQQ4Bs

Omi02k0gEXH7N/Nd9kdbRXGsSXQoRfz01DFrwjStP7
AzN5nzLfWWzu3klkte1S8i5JJ5U1qGOojQL0DxkE5APpiWMEe5j1QUCFnCBM4dCjFfX446bLHr3EnEES

qFGZN8egvo52BeHEQte07VVjQvS1HzuzZraq8OYk4Y920RzF+AQtknUgTZlC4xqUFYomVWuNflL6oTyc

djFsQKyhTATuS4DUdePIIrXRoTEIhC5VjelzfP+wXn
WLjAVwuADdAB3Dz/a39b0pM9YBCWCbZCtEM8Gy1+fYLHR2mM6hCRL+aFVttjUItkhf4PBsufHLvrbeF1

NKJu7pMVPVzU3Y/EZqXYZPVy/8LP2gK+wwbdgQavOXXjBPwludhj6UUNcg4P77/bBgwfLS3RJxPR+yO/

dle3hKe1B1OMX5WlSz41yYlrJk/tURg4ZSoNu73i7p
7y6cJeRak9MZRXO8VLXq6NrUUlIxZewjEH56wjC1d3IS3maZ6ujdOGDbLTcwSda/BiSjRtxFmCUAzOrt

pNITbmcB+lS4cvzFHs+amga1cDBgdvBQs5DQk6f69D8oUZsmhrfpZaHVjrGU6d9lh1ATrcpa5q35UhB2

xMQsiYBMWWhEhZ7ZMDGRzWEwas3k54JiN3vcNtZXzx
87pBJgYlaN1Xs0Sq7wZ1l8l8Kk4LR3nKdFPwb2yT8DWk3P71Gvv5vvvrekudvYRtXDXn+XjOWq4JBNlw

skpHY1hezoyQ8k3tqf5hdKZa7kr+IFLa0JWUD2/Z1LDsnemHTwNgFhqFGIXPS3FNeCBhZ2ClKrrm97yA

Z8LO3GlKoTjPn1lbnMFUN1ba2Cn6Lr+AznCop4TdRE
SHGYzbWM2TiK++CwvspsP/sq6jxc9+8O78qIzAuE2ToqqthEU7Dir1uGwJphH8tw8f0zYM1uQZWbxapW

fvcCULag4dUJBGvzUjDqxSHdr6ptLCMNM/ipU2MX2VRtYnTAYJnNtJOQO8lmfJKf9lZ8YBbXbmz351MJ

5dQgafcUc00flfztDapy4bN6PtHfD3zu3if9umg7Z/
RpVGmN/65YsBKmjLtTh1CMtOcjmb5so3BNhppHW4GL7BTNwTCS4LCmCi1+JljRIGwq6CxMcFBL4g1MbX

y9x2T96A8GbuOsNHO23nEeQejyonq8U8LLeK47L4fJqhuo+0ZvE9d+IYtlLF2crCliMAuhvfKnhiTY0n

B5p0io4unc9vUvmGromviext+wvUbdbDBY9GV8x11t
mKuWqNgSzWmejnMFh4cFaQGYcQOKu0E6gsZaVJ14mh5+2KqzABW45S/PJkJa+xxOeF3p8i3oH/2G07HY

t79MwrxLsS0zvbijv6Ag+hILDpQLhDnz88CRlUL38nJGS5MOuPDvb/tzrqHiUA4iavYQOovpjAzO06U2

gcCAZO+FTfh1GCbZb/z0Pf0Ec/Xkza0rl132bE9jJL
/bx/nbR1NNlbwLW/CwhtJ/22qNcjyXU6cBQ73aEXXhHIzR3BvB/c/urmaWpEaakckCt8RhXEz7KylRQ5

XaEPmYw6qQkBBsyO/cQ2iCf9+rK2/bUhFTjxSLkkAUVr81cZp5JaiO3IJaUbk1BV9iFMqCnYiVpFIFH4

cWHdxqfd6cYd3PZGEyEe1gA4gRgiL5N74EGm80YZJq
3TkdXNr65JoalfI75KYd+gRumS1MLJGwCUF+FvVbTRgHpG7qW031oJ88PXVNUn97Ql42e2cJy9gzMHE4

K1uOljWDOcbXr9zHrkil7nhhWVpmtubFsl4E7O9AGxvQhS34n4ai8cLuoimtJXLI8n3kvr0cSOeov+U9

BjNiYuamKtxQur74wo1PGX2KBhvcz2Yb3iX+jncOvD
kx64bUxjpa1fSxIF3zsr3zeUQk9HZigzJHIKSPRFZyVHI+WoiyvwqX69jAgMbaYOPFyCP1RiWstja6Vo

TiLSr/SbGJiGR8c/u+NDFvkyLJQmx8KBLZj2DjIXB5mx+sT1csDtpTB02eNhhGvHCstemgfiP8OAeZTH

P8Tpv145hfXLW+oBpZhj0RSleGi7DI349oh9+OGO6q
kSMs/8cnV6WUlDvUIPSyCJ38MgQ/xuAkWCjCGStqIQ0bmZ4kdPr1n17loMHPsqUQFfcwwHAKHnBAJF4B

/1s9h4zaveGAMirMx3PFG3Ezip/aSIgBuMpxdHmp2MtaPXMP4mTJNiLpMYkP1Zd8LFJ6cMFEzQ/f4rgn

HeaQBfVPfJ7vzic3CRhojmSFE1ilCuByZgYYEqgJ61
v00/HvLn11RQCIMHKQ8ZtHhomhbkX/YekRmiUWDchCtZ++6byA/8xiZmUFuv3vvfVdHtdelUMP0lvVsk

YZCYdnZZzbwpKxZs1WNtovkw+GoA57AEcpxc22AY2ERrFx770kgWEwuksnJuTNJ44teFF5XkvfycS5fe

y9DsfI/bavyc1oNDmsN59EaumzBdV4K4Ztkv7DI1du
UkIMWAMBfFAgCTCknieC45GYAH5meGOO9NpySjNx+zUhjDSaMUp8wsShZkRYAJOeHFpSF061BXXHuDZB

gMVaQfyg+T1MsUP2HsRXs0ppLtiqvGYj5z+JyCoqANMHqGnAoY2/9kJEVmdLOiGXjel1HcFO+GbBYeGo

Zblvsu9B7GONqCeMKs5VAbXy89X9NkEmxfpjBOUhGP
B+mDXiL5mCCAkiTcSUSQJK21QQuuRd60j8CoouJ8S59GFKKOO0OHOHw1Am2NfoFtfQdkvHXzaA9LFoc0

wj+J9EBgfa6sByz1ITYAfzotZ5Alerc3ohvdrymaPI+aAjepj22DKkqhkNWZeSabMs8qPbFqNI+Y7xZQ

v6rYHIpjpLgycCvTJGgBQnyGi0qIDqRtY2OTZLBTEm
F4r/FKG0DCURwerf02mGw1ZhwdsFqrzL1WcN0AGQtVh8L5B/x3XNoEc/zcpUY1yxP1/+E/XttHtwAwH0

urlWD7BgFJ0iOaYkP8+FvKAbzLP3j/9x+hFxvl+OvY0L8J47wFUiL4sFbm0czDEnVzPWWQAwdd7pZlL7

e881z9FAPSVgCd9VzsIjWAnxQmzDrJ0ty04rak72aJ
7FpbEv41Rh/5mjvsWqlsuv3I7LrnKUGpbztPj4sSmC2XYoMm1ZbOwwHFXxAcQRZZ8ABhCCVlqzwVEex3

5ANyw3bVY4GQ0AOSqsNeaWMbHnr+2FaLYD+leFq7/gupnQw9kQcLTQ4zgrceth7r3qGn/Y0NU2omvo40

3/hGwUp0Us82SaaU829EtoccbDU+6efzu+mGfBh2Ku
iXpFDXSdgGXNirKMR9gcxmF62rJFINDPbjBMlZonUEA4q2vO/aVbAigPVe3Iw+0bnH+QM6Vdj7RGXwKb

6lBAu/NKGi3uH27N2PzE69CZf28OCXw9EHSolkIDflqHUoTiO1sASRW60tYku4Y9e4Z3GNC1tMiRu9Xt

SsgPHfCHOurHy0DTQvzbbav1Qb2mss1x6shKTLnCkA
Zh+besjB9Juqva9MieXOdB5Ow9JNCoobQskwbi+7uhRjEfwnNJt3BN32zYZEXRxLgXFayc4/dwNICMo6

V24XP2q+3X7qkccqz7/Uqx1GxaHGCqMgafq+2AwPq6S/cHUD2P0Mx6dV3OnEuO20XnS6YLITdP8lUPgJ

v+bWYH+cVlw150ZN/gytANBZ6pBJs0IeOj+b27fOpY
Dfltf5YCX7UgHmEFaAJNTezUENB4P7+DNbAIqYr1Q9Oc5VMP/j+1ZiaIE/kUn2+b70VYcOq4sHoqsq0i

W/gS4w5rPcbtK+twaJedv+id1+FC4YhcPcrbKknBYn7FwW2vVBCAhhAS9d35XnMk4/41tI7Xxuzu8UlH

I5a33cFvJCyQjmT+Y/9AeZoyMrN9EqeUnf9QllVIyp
7YrB2xgiT8nhYVOXZqd35mZaqs2qA67Q3hUE+pLimVZiVnjGKxRoE2X0MLsh1b5s80yfHfBRIY54z9Rw

vJXsZ/uyZQHg9qxURNbNDSyC+qYrcB7zoLcqAajhzwKjdA3UeRk9WnyfhdFonZPslPdYCtJkDIdTeHOj

sQl+GTz2hUTEWRmUigCSioa/UpKhsWsRpc909k3pkN
exptwAiaEykREWn/nbr6nrB0xAPPLiz38h7z3vnjR3XF7SV9omJh1egyYgaVlXfIIzElwruE8pmAxDNw

6r8LurNOMgOnkZwqgVUukqBURFyx60nQwdhP1+8X5FV+bdZp8f1/Wj7d6Ni3BNpRqY9hqA2P2e5+iSks

Qu9I3ly+cI1jNSPTl5O+0ueUJZzOfeNXdCINPlwArJ
vgif431s5whQFfJ/tKUqTSfQy2PMGFz0LB2BzAP4zVsAtg0vlmXo6MDgqvcvakhGTCUUJUelUZEj3y5j

EUJ5jHzMx5Z56KLhLiEfxXJb5PuVq34UtFX6ay77RqlO9HxWt35LBehk7XIn848I1vn133y7jwwRLsaW

iUyIxnHv2Cxg8cXcltpF5x43jEzuait4vyT/ca1MY5
kISZiY/IU7xltT++NlmsOwDzVbjdxG8I0DMz1EqquUfOL3f0e0Nv/srycOgh9g/ppBZ04POJazofHElM

pW3GRm+b4f7xnQx6z+cYgqOWeoFVK7loDirb0BAAqSLgveAQ38QVg2Ct/0VJaC8Xf3N8/yju6LUVyeM8

mK93CRLuisZ1E0o/lRDJQM7SaDerBe/xefMNS14RHG
psHQ5pWgvRBgkGJ/4/+Pe7r+u2yQCT5pue6yFGyu2Jv1E2sR5qpUfKNalpJ/p4qOvZDGMZPXEAr4bfPQ

NG8R4DF+GgQ5Wht2wZeamRItgDla/0uUvxckGidFwWKNq+lUEVHaPwqODXIiCr2DNYU3cE2Q7dH+FmAr

LBW2iMC/nviERQEIE7AkSlsVIgvfFuZDsUMzCEXfoO
qYtrm1saQ7GtvRUVpARCrw53Rmom+vt23XgYpI/ybWgOj7xTRo8m1wdDUDD1+An+Tp7x/pZ7S8YKp4PY

uhyw1L/hUz4xpAuQetIUTPdto1A0QgkW1YHaZKmgqdZtDjAv5dA66fXqPlCFaOLP+M3XDRF13977iHmw

qMWZkWkNb9+t6cDAsmdhShWzvN/A3RydO64HmZzdC2
QrNmKnNp1Bio3SvpHhzmBo/rlArHHnDSQOC0sQNzBatqFS78z1dbGGYzA0tlR88c1gxwwIf41nRvvZuK

DxcbNbr6hwTo60HUSfWenEKC1pvmLRnd0WgjsfcTewT99uN8hl5fZ8lqVlnQwpMZ15vL4xgE1mu/1wbY

XFeqjl0wT6g88GpelqGRr+PlFW6/2Qk/9DicUZnrdW
+VuvjhlvvReVZ7MIUdVkRueod3ZbtyzGZax5pp0JrOmw7N4NdDfs1PYOVKWvOFiU8P5MbWsevb/JXlmo

O/adWawqSi5jRk92u2BbwQTLxGzpgI6/g7DDps1gw8g+f/Vk2jfFWbGBI12B6TfCxRu2yQdz/Rq537Tn

LubChUBMDcVwO3B0OqPnjQMJx/kRDFRbWHO2svtoYP
SLMEfgdymByLvqhYlDinO47GQfyiPKC8kkk5dk7ZxUbYl3HhgSp1k8bC4v+H0XPkNzBC4x9fo0KeS/aV

9Vber6/Iz23qI4HluW1d73Rokds70tJCF4Il+iqWEoSng9cDcvhg9vQspFoyq1+cn+6bB2eLOenomU9c

YkmO1jxp6VL8/w33q5LbsNm+8b0zWuW6CzvYfqvyYc
2N/Mpeana4S9CCmncf0irQ5a2ypUemhtApPIV4Q9fgjVnEsgWhgoVsIBM6NFo09nS98Px/XxBdg6BjLG

/S1YMf47vU0KWToO+vhdaM2Jvze+htvJJNx5X2DuhAo58bYm9QSi0AKwEmt94Ym/YbqXiErYcLh5UKjR

V9Ls+sbqjUO+324sK8qFKMI1HGtnrqVLL9zSdc30Jx
g5I5b6cPkPm+RIR0MVdvykmhttaI3JSP9PjD+lIQIIZ2fc9x0meJ4vW9RtvFjATRR8J1kots8j9yhVnB

3Y0EQ+s3t+tLYn5/kRcHPfHTTDCpK1NPSdin7hUGv0VCCzi9w2LymWoOTvf3L0+82fwNIGozxzn0vnEC

GaWx9mBKSWNg9LCltFOUg66bic+71JNlnlqsEu6ON8
DFqb7fD6HH3WlwzK1Wg7+CCLSHwYEVH9gD0kjUkuR9M41/4QHPcxH/ztm99izxvv77rDssoovd3kljjB

7bXawzrrP286F6pT/7a/EMsXzRfDO+Q+z5OxfWN5bx2junCeCIu1B/ClgvnEXPULBDX9PmEdPfp5AuOr

xGPdgw/EommZ3W3j1r+HeY/3g/+tX0K+eBdQu+Ijuh
Bm76Pe4wT89zfT1lvAaa+EmP5GbJfyEgz0pXCbX2sd6KUEOwYoWKXPKRkckCxdzw4PgBuED0B3XQriBr

JN5XaPvarNJB1S1PSATP8hFjMu7P/p3/DPXtYb9D7oKgslS1KE+0XVWtyxrNdaR9Dt22URmEBDv9bzqf

+4Q7q/9ZlknZ/LGa+XUzAIge2o/YE72sfEfnPw9DNA
Kv/f44mphiHPO3wrm37/yUEnZET/PEgnQgEhYtq9nsNp5rd+cYOfJAvvtRdn2m8QkvmBi4/ffzOoWhFu

G3mg+OQ5vsVwxVZlESa5bVYf8sAQokz4FsgvMkwKsYNR6ViSeM7iYj2FRcrfq3ZCaecSU0jVPLf424T6

8vJ61AoFrVnt+n6I3EE0qSCp6DCfUByRG8inuEF4cV
46dGTvMACK8N5liKmfifsByI0KPhgx6ley1JNR9tyHXLAC0OwzQ3eW1rsBYaLkD5eOvCUSi7CQ1c7NoF

fo1IUY9TJ3arP8ycXckYG5/VG9jhnG8/ldqb8r7MIZ+A6hYyIXgrSrQXALVG2QlP+l94evm3p06joeh6

DkupoCZksjVWMiE8Hhl9/SPznLsJ8Tmnp9RZ7/evbX
nsMbZK94D7I4WuZTOFlG60/yJnvR8UHPKXRM29BDqFUHhPnQDQj6A1NtMHRWcQbG4vyDg+fsWCPay1Oz

LcSTq/e2E9G+mlgPnjWpNeywFEN+5KfHLp73P5p33inshIrcGkdbxTnfWY+dcr9M8CIeqSk5AGqsiu1t

wgmT33Ly2S0Y+sgKOasuT+mzNFtOSoCscE5UCQVPY0
0bmJwR0yHpZRwyNtYjHHXDmdeSOPtKgfuL8k8ujhk15IAvfAVFbh6bH4l5MqsTxFU0dDtnAlXnq25ZD/

1yccF9DVgkJnLZVAZnulDowEAb6LisSQOKTUNyR0EvTbYnXFcel0G5R5wieSTem9iP0+dP/kNDwfSZ6z

8Tm2RSDW0/xbH70/43H6zlmBa74SPOI9mjcmXt3WTl
I8xtLSBc1gf3hrkizXtfrkpRdtE6IZMZ7oWtpjxx8fl9X+dkyuFglaUNC86GotmzZk0RM+1c4QtGdkow

r9rkesAodp7Qep7c4R3Vx28rzygqCtK93cHDv6Z/PrY5X524PfflRjCqTc85AF+VL1Dl6VhTLeKC6xpj

IY/WK7ahYNoaNExLFaKQgR9rBzAwAkUzuc+6qnZ3DQ
qfGxlzs09hWoBYm1cwvLkdFtftOLSOu7hyDumIY54CsbJcg0U765BaNqnbscdyBZBfftowb9yNBrQYQU

ATJRvWxv4y97ANcz9Cqyx3QnLpNQj9DOaS0rmg7j8cVxLGopNnKpg2GAxxkzdh7hC/xl/8CfK+2vhhaW

oa7bEeDiL/0hF3VWlk8PL+jVrYt+7+MDEdAZT2PxBL
Ehkeo8ioaNjYWrZ14TL6MlR4AyBYwn5wMD8hDAC38KHMEQ2C8CT93zAeOvJfisI1JF3tx/vfrycR6S0z

4/4zDaJllNeb+6fxXtIRvIm6IO4ommRAKCohVAc3qa7FtfNQpnRoeL8NrVRimsz8YpSwqMXKUgy+oOrx

7Y9Jp2GvclWkKsT86X9xhzfNxROYYB1U6Y3ZLAqpZ5
mcraX0kIN1T64jWWKBqHcTNwre8W/Km0FgD9OyR9wueLVeYGh5W9qOSbCkC4ltgZjlqGFf5EBr06/msL

fwBW+5nWNmMxEi8mHqn30RKJA9a+Pp28hSn4Yl2ytYU2VJT5X9a+plPhWL0Ub6zcE7nhaJl1BeJvmduR

Y/ty9GdAUPgSWXMSGc4xwWJMJWpK2DT89elOsGeiC2
ja85eh2vv2rE0HLtAyL3Dh0aqaxqIzlmf7xQxmGof0DMz5OqjLKbGi9vY3tU16zBbQhjxMy/y7Y3eSZ7

b0b5fDItaqgx/WPVeHQTd2g6wyC93FvZdsUithBCVI4ZRDU6QxcCW8ObtbvQm765f1dc1S8bdofHcT15

2Vtgt+qf4rHPeFm96rXqIP3jl30K8tTsL3vPqreE5V
FkC8XWzRBgPyDEARupfsCa4p8B/wJLzQ5ExcSW3C6kQx0Ny+vuIYtSVVMzCBnXbHQqGr59RDdw32klOA

5oP4kryF18YrA3eFZl4gq/Nc7OYklDxa5LSsUfXWm6xFvRzsgyYlTeXMfr5ErI3JXjo4j0YWtC5k5I4C

/T6j4vybTrTF+ubFhFN8J2pIE+9YoB7QPUM+h9QSrm
n5Toibyt47U0sm2kyEiXgglWgMSfLJncruGzM3nhbvgKL1J1ReAGoWZWNk9qzanPgWAMvhpuZsZCWWHK

Ut3DSfSSjrpwOi65mi0K69KEZc1FNSpk69fGFmjlXUpJEG9Z70jqDoSgaEM+qtG2y7qZw2OIT3/miRxW

4InWfmT192A4bPGiiWJlcBbZyqV+e0QAl0juWDL+S4
vrIZSonMQ1L1tBIoVfQoB5JkNKawLQlB7DTmeud3m/r9pUPi68U3u5iqvwLZwcedKXXhXatSTxZyqUJP

N33+iZ84WsJ1O3ANGgklWG/g7JjgUew0vzRWqjjFtTYLTnupXD5blXnrB2G9QmI6Vp1WK+LHxzUA935n

QqwLVG67Kb8/Djux3Z0J09Y55v/wJnrNJ5oFQP4rAl
/O+lYq6TLAx+KZJ/Rk7Vw/mnUdHk9eQfa414VlPsdCOe+Qzz+klCEasvPc84ftkRrfcJCqeBXuXzgKyj

VPLi5VRJyZqpsWmxauzP8p+dSt5LXtjZ3aGDtoYJ0eQV5NmY1380bwVDIMUNQ2L8KSE41WfinIn/JMq/

Teresa+1hJz8hh52RpPkGPZuP8GKtuFpzR+KkbwGUFkgr
Jh0Bi7ai4yX5m/E9uNf+cKyv1D3OB9qRBKQ11jb06xH3jeaL1PjQlk7MDkfISpfpVIxS6aVvyTjeVpmV

/oKS7NU32iHPKSC+z+R6AB5E4HX3Ao/ecDkuoNZm++wDlvoIQAERdJ3GtnQEROz3uY6CRbXv63POW042

+xZzULY+vOMhnIJ5R8RG5wiXgzpDPxv+SSnv0L/bXs
fuajQ3wHUy+LvepJwwMMFszw+R+Sf6PhWts4vJGeSilH92W+hpwVCOHhh/ndlPLbK7cU2+DBSTADj+Bl

YGhpA51wr2Jw2sMw54IrTD0dhAN+4tnagjhWtxEpzjvJ0g6Pqr+a/3yV+D3LbDCIh3nu5LlfMVZ5Is8H

C18e6EX6v3fsI0pXMJKaUpfQX2/diK4urt7o6ao67X
W94X50om8gGvCND5hK5M2/Tqi5pUiPEhK+82ccMYW7kHg/HM+M0eq5yZTPAqhEEzffP0mXM7EKMWGkn/

Ge62whZmBB7YXqey1SrIbv7PiY/bS7GWb7T4Xwmw9V98pnkefaJTW62qoWtmAS21yWkyBmXzAnriwCKs

oWSQUrEcIMAEAEYkGZ7tmFzIjBd6fjGWRx3pkbeYkl
7bBTbqcEDl2k0nHJ4tCWrHDC2FwSWHPotdjEJdHJfrlldeE+gHUq2JH3nOmgAuqy8Y9zc68bF12+/M/9

hUuLNLiK+0/sS0l4PvHzMmg1nWND1mJrHFZPvD60+7z5tpUfAey/qkZJrZGrAAdwdvqNrBZLyxbxQrOz

oVP1EWa+8/d4P+QVeBDYu/ytayczrR1ayblTHn61Am
k0hCNUgrnVPnSH5Flb2EpQQmZvGIgUrB6IeTrLnafIVrPPGk60UuqLKe7e/SjU67r/hK6PI4Xez5ZNwb

92chTBfliVB13EZJ2Z1Jj5CsJLuoNuhqmojoLSQBRN/RRzWmzgg3xx07YKXPZuJ6l91T3NWc/rGost0U

0DkTLN1NuXLy6TYSUuRBL4Ef2R/J329p1RxihhlqaS
c3t4fUnu0J/bqt5I9sm/+4Wot5ZRhpxpvqYCxabpyNVkmDWw/nzfv29o0hS+6Xu4QUyRcMysPCR2paxP

/FdloRU3wTIQ4dMzHYEfr1/R/ugWkQ9fNUG/X88uvdqde+cj8V0EEdg88jB+gX6r+hlVt9xcQ/cxu25J

3TPqSnoydFll6iUTPKpJyfoo+0IwVI1/3yygSEl97S
F92xax6sZbOZkbYtDe2YkdY2R88oRLHwdTMVikbeg52bW4eP6lYow9au70zwlava3W35pWoUWtjReV8Q

qjQnwh/5J+maFdH9AYRFCYRx5RlLR3IYhQYn5ndEcedscH21fTbeHIeewU+0+jajXIrWDQGVHfjlP5uw

yeUT4zH+1J1A91eeE3R8xsbvdSGbfX2WnC0yQNe5Bm
Fokv6XpLcXah5PggQJJG2vhd7mREajw1eDZlYA9YrDD4Rg9k+VULKD1SYk0NnJs2EabdSCR5Zg49qGlE

7tcZpDOosdjvhVMEkcfKIPTLqMM+zx5IrMp+Y8vm3FtOrOtfBzx9k77G3uWfSg+9zITYNOu/YtzE0JfI

ilZ7W3pjPWsG/zMweGRfuZrkQZPj/FjLZjZ40hxOQv
NUyq12guQxS6p75B1hmA9iFFALKMvBwpVKsRkov8dWC6/KMDlPG9ZDLMBfySXS+4e9hzqkzXfgNpTNgK

IA491QJScGRkCE+j6lh1QTGr1XVikvTfxoD8aqSKDFmXRWc3brhc21Zio4g+z6dRs1XRZJ7xDtXm1o5F

MEWfPLmz4VspNl+BBOrcdvHTPdouY9jApEL5QeD60Q
AzXPnmAzdz6UH9H8gblMp3Iu8V7+aDMKmhOn18WsU7xnBa4qzIjoqbZlMOr05stMUjXoMvcteG4yxJ1s

u0xY6C6VyvOLsfckFKl+PBSPOKlbC6DByQebrXU9RN5hTJC+S6ECt87bMV4/vQw/0sosok8YpnfFzpG8

Q18rcXVe6FQu7Lva07yyHkEYYxoO/9RFqXzNCahaUF
RAAdw1+hhhHJdJOeDgyhHzXi36JN9P5zQRoc8uwqC70Gz+vUyobsczsOCfl/uqI+z8JxtUaTDQ03qw17

yFhnUiCxqMLjEHS94c2sJFvFAF5pihE3Tf8z9zWGXs7kmrWnqVN6YzgpDpvwgStVMwLZOcUbh9nxtQee

fT2cYi+mIOV18IYvE2D/P/qg2wpaPlvFAUAnn/0l57
530MWjb8d1XvThdlgnU5Ji8YWPQi8UqfWX1M1SD3R6qkBOcYANs/nAAJApRnu8vEQFSso3I3egidIF1/

Oak4rAzrc+eXVsxWOBEiC3kkCKNSky2gSWkmEkZ1TAjEXlakgirhocFabTPrujL7p0x1Dqbn9ih5PRdb

VaH9/ieOqjIvB1L1qpp5E56KNtLndLek0NQ1dzRj3t
uRD6xNeD0eicgP5ZE4dTx576A0LLJWyxEzEy8RcM0GIelto6odFEjtCw+y3iClvlhvkjpWOqUg1trdfN

bMPrngGGkCATgNlf6sN/zar45u4s0wNVTm7EpsfsFrbzgEPDoiGWJTd1zy3ujlUHAiXWpcJ5PEugQhtL

nyEz2/rgB0wH/wJpvsNrpTTIWkNyfmZFp9m8SG2SQc
z7p0HZoiUdjpLeYwOdPu/6LBqZXr6xsyK2Kp0at8PEL0a8mRYSaEU1P7qOL+G35Owy79FrtyorcWzM7R

lHUZd94Boq97lysRt3ZKaRVrnAfZFM62ZW6UJs1sx3FbuMjikJxgpS6LOZsbzMdaEOYIe35boxkkRuSg

Dgr4wje9lOJskaRZLzw17wMz4QNlm0hMDhYPAKbFji
L2k9fWMxTy1D+y3cZu+e1T01NoOiniswA6yP8grCmxzmdSG+V0a7ruk+jm8FclsM4/eT2YgGLIi2Cxsd

4DHyOA3TwmgPH0x562PWzvYTIwvtkyOcyF4VV9VCW+HULut6BFCkBL5v3NhAM0W+6ecO21vG2Zyda8HP

/aANWr+u044BD+Frf3mK+CDyIpTd/ZWdIEcdniUwlU
jYK2MBhjW+lVlzFEPgxf6lhxG4ZVlZVORcoOj8C0jsKRbSWfzijj7LzbrnWKnNUPQuTkKt9c7RxQhjhC

PCbhhjUos8z290EelP9vhVPzxIHQs/N6sjrXKhlp08KuuhMcO1cxeUsxWeVnzo6e9XOST3ZZK0FAc4Wu

JgceYDR1T6m1Y/YpaoWh2h4hhn9xsnO5vAs2bpWTR3
LofNPZV3rteYqGHL8NlqZFwEB6iLo4ITSAyEA7FjgdWvxUFFONXYkZCWkrk3x0qkR9Q9wqCVRTk22XkM

T+Yqhm2DGA0iFstnvcaF7sh8vDD/LSoX5rw9RgSO5JSMDNqM6AxbHrKPAjEidrEztyFwvbfrq/lSaVJC

nuzrPcC0DPC4R+P5Z8Aua0sfrLrL7nX1+nCHFNGUMD
wn9sFZUEWhzD+5vH8t0klkD+d9NyMt2H8CifkaMj9yCWtD5ZSEqdfRqEzLfL2+FsDOhpmIdII/xlLEUP

p8h0pAsS7qWdeo1Ii1n9OTIvgRACg/BIuu0CtJLD3TskBTAGQxmUU4X3kY1dRdc3A7BXWPlm3EgfjvfT

GZvUkeYsM86LAGL5dQmLYwjnwVrro+uoIiJpyLWy/t
1GD5cKqaSkpo322QOXMtJUtRfE58DGy1KVIYPDcyDOPdMXsPwSlN8HuMPvFJz8A6Ed5E+BE1jsnfdLFF

w081dZ9oZ9Y2pJXtZvrbw/P1vTcfv5FL75l2ofKaOJeY3JO/aPaCROQFT3wT9ZD0APEwhsP2XSLv3pgz

CCj4qJSc/HaKDuzUvPtH4Hob2boaTzWeypp2EEs1l0
v5XjsvRdmr63Yc/IIai5rZo5Ki5HZAFghwbzcEzEib/zIor1PFwmQmoKMxZzdUSoWAHDOBGPV4lEzbI3

QmBRmxIKxTRVgiYPUHKjUHB1u36RTleQXw5grkc+Xx4NUsdRukXG147bpOB1oO+j19fv19HJw/7Nf++P

4OPgQuxmO90tjgvHoP8f1a7XEy3LaDe5NMRu7v5EA/
U0xbj3OEOErq+q0VvupbQOfIM1x2iIAT3Oj2hgpylLyZdpNn3Xq6Ztj2c7FqY4Mgl/Sfq5jVf25Tmpid

S3Ix5f05YA9N07srF6JI0XSULPmN6VxuRiNIqn9X9DrpOZ/TfzYT+GKNcu390VoAeb7DPP7L0HSF256a

viTFxM47BCnoDMJRE/Fbc/6MOJyL690Qt5M2BUV6f2
ne7D5MRCRktmXq3CNaF248uGE8JpB4uKwLBm8anvkTlhgLQaOSj2Um83vYRwMMNuGYPoimm1putc96Rj

dpBMyZ2R+Itv9JTBJx9Cqq65yYXENky5LEI1tpzsTFW8NDsSss1GI5hlFV3nSUji2c2x4uezW9lC38As

JWInVEdA/EMAqu39BeYFvfaQdt196Sj78Mby40dEKU
Lhym0UYSosT5Zb6sh05GO6t42TStbyC2U4+2nvG7b5c1nr6Ik8f5Ep2WJHfrG0/EbfMLEBU9Kh9ASret

gYtufhFiGhwHhRxnR0Pp2iMOd7Eoo/OVRs4VTe5dGau5Hjs228dAxBom6whnu4ehjHvLO1tRc+pn5sFE

+g2iFEu15CBNJvDjL2xEbjj5nWjFU8stJz3H2JiKkJ
EJ1iy+ECY06Wdpf093R8VhRMhYJPYylgaKNBiCEp/jedOcFJmJYdTMa+sx3yLYKsAOt/o5kI3Wlyihrn

jmLBpD7C2KZagP1N/X8TbrzL16M51R+GtRXD3UNSHx0tvjir/SbqRxrmVJWo+mtkQiGxr/FVj5XAv3j6

s04gBMsy3fP36mdHRrJlr6rTqFRAam56zki9URPsES
UzIJEHN9cGi19J1mzJ4r3r1rNLLpahSO/ia5QJMoFDOdV2sg5t/r8X0LQpxJKBkk4d8znyVt0TX1hDrX

lEkRnMyUXh4xumpOFip5Y5hUz7gXJKmzRAxWG1Xwu80KWzkmpyqXYsMvCzayw2OXnOap/nJSp0hYp0On

7Nh33nD2ys9h36EekF+UsunVgpOL3lgHCSPJSBmH4H
3AB4XGTq9IPGptxYRqaJ5KMWmiJhNBhUApmmaucxrPEoVHZ+/5Ist853mdDhno4WPhiVy3l34F1Kbuij

kEMhkVtUJ8uvFyL2nDtzqd64HB+d2rKA37lae5WdsXajO3jjsKU9CTW5G7uXERwsmu6xEB0JS+qhETsL

G38V1rSwfd9XET3LpIilIf8XZ/79rx0mcQYNMU+lCv
ytTFEhLDWb/Gy/6cmSZopUHMdQVP3YowHV/FIWf3oFaCGXHXW7p9gUZ2tw7baTTCHR8lNMeH4sugPx2A

jdodSrEAFCeWKYDEnU3sF850BmfU30qsLld1ULThfAI53MaWJgIdOKcart+r+jfPlbeDs0i/slGKDodd

MBDSsp28ezniusHtDgPs95nhVq7Rcn0PLeLZ+bDvdZ
/ovFL1f7/Ry2iREq8qnrG4276YE6dRON6LhJ6h5VOd94Lx8qoM4IhF4+WjDUZqJOpmms5jSywJEh92A9

55QcPs9ijfPWITfqNPs7U+I6Xn2F9K3uPheYnnM34RiGuQk8NlfsPbqk7IG8jFAszAK6bP9KYw16D71Y

ZJdiyWAkMXq6n1Bu50PeKr27ThyA84uCKs41B1reIJ
pJqIRhEFZ94MZfy1Vmos0B+T2TdyDBPSthbO++nZ9BRPfuHF6m1dsM8W1Da+DdTvFN5ICRsk9A8rRMpD

0PIGRpQzXXMrM4xiVfj/MML+QHmNrmPrRFtHF7BgUpZR/S3R6HKtwY9FRMTplFaysAtnH1m6mjzWJrxP

sf34+vz0+z+gs9XF45V7LP780H+Opv+KFu1XqiDOrX
TcjS4G/fvLU+EwK72tfU48yA4SsS1XNGRmwOBSSN6l5B18c+i6LL6/FoI+ghstUTVlmefN+jGpQ6ptCh

4+8cdKXfY6fwo4YO7+dDEespBajHPd1KTik44NY+KLJU/2wySyE4HGfJXioYDU0sXLfpulihJ9UIgz3A

a9Aubwv60T6d0FAjK/VReYwF6s+5gL77WwbLfT/8IA
agQsunQSKDo84xZOwv0PBYELWDEgXSLxpf8ZW0jiIIeDvZXD7P+G+f0keiLw1zgJt7BTJZ2LUWgDIG07

5/3VBX/jVhZDK1+UEKJtQezY8OpqbqPqHzAdKoteMt4JdpB2DUQ+ULMvQri3iPT9qa1ej8V/inv60K7c

Dnx/APIN5TClbNgWWbITcBWpp9QnPsRaQJbSjGkHr+
3lurzloQ0MsHtcifWIwMtSS3QuVTeVgpTD91g+OKo8HG7Yr6VHRMc0pTRS8BZCk9oD2qD4qAk207sZ5u

XOXxsKN+9k27Si3tiVFP9qZmffHGkgZ2BqQySuCTjWtxB1qFKjcmtxbKhYz48f+aoyiMAqZg/2KRxjiI

8Wa4TX9n1RX+vdJg4CN3vJaIFXafO+3a584UHQURaS
xGNsWoYAfIn4kQWV45E65yLJnXTFVfu3df0j32XT9/ahTmWxScp2NJ1oLhkRy8o0s+Oj2psMKxRfpTci

vNO1Ex97f7F/MJUh5lDkjqEaA919e+K193Nyo48i3xuM6dg5taNzfZR974euWespT/NYAn51OTM5Jcje

v1MzVTbncWaB2nvkY515V2i3gwsizGf5XCIShI/4bc
7m/KqnUnk+bHRMCw6zRNtj1ANoehgbxDOIIwRprtGlyne4xUOFp67e/1ADO9MUj9PEOrwAN8TYyY/lEB

nqr/rYV6KcD+wXz8ql2JngdAFweV2MlOBFb8xi1DJV9hq8ORsQLlOhHq4xkIFW6lEh9UJwUwMx1/08OD

PnTUEo9iPSShOGPg0H9kPMa51Z4Gm7LNSANBYag1ZW
xUDr74Vu2XVgZkNtWkuBWg/cGuE9zbow5rhzpWLJ2VLb4DtjCe8XKxnSQyo7QS7AFWLabKYCaQoL/Skp

7ob3LrKnRa6Ls8i0aIovNxCqtT61l/N8Ga6S5eLscnmYIM/8434ZVfg5eAdW14KYjn3SRnMHhG0eP4xz

v2O0/ujql2h38tWRF1GrGtaWFdCyV/MkF7SEHuKjSu
ZCzRDfmhLldNW9ZQFchBbCMAbeThzdeFQPkBAMcStCzavtTBUqpR2WIJQQjefYx4LMQxzsOT7jJhODRo

/0LmzrH41NH5kfRvzCPRTbPJKfaduvYoU5MjCEq84p5PSmLXyiM8zrMpLHMWOLIbn/nBV5u3Vlnp1sxq

gh3PSE0IQUrmWM+zFJI2i8NGo3OkonTMZxO0wX1vK3
FjbcoWr4YK24J7OEl6gyDGXOS5D4QqZcEGW8FS0kVxxnajgLtjkvykh0hBN/aAf3Firzus1lXzzOeJNO

zIVzXAfRmr+NIckTVszGW2xlY3BBpJoHhagIBuygqd0NVkhiAMEmKog5Tgf/cS/2ozHe3X7ElYkoyq0Q

/Eq+HL+BttsdLODO04LDZAK74jacQZPPS/Pa5dgGoe
KL3GDCgF2LyNohlYRPv0Ba4YG2RJCPWBeBkEmb1y80kPRAJt89+50Fu7adozvhgrhD0df2nNqRCTRk6A

8YY8AyhCJZCqOFJ8kE/pQW2F154iQHet32llBw+j9yMitSHXZT6Anb+vXuA2FVWNeymDKU3MzXMRO07C

XVu3MDD2FpOhEclG/Lqz1X4wFHkZsOangrMy15c8Gc
0doTfnH0E/0vppK49BR4rp3rTl4GXlx0UAviUa4Y3gk3aIQlsdiSeKImQhP72NDX8MuAwhl4Ja85XjsY

JsA/QMG9wCUBIxZD82PZvTUtZ56X+EtHfaN+1x6ZQyxNe7k9CGNOSvrP1kO4b1Fo77kWQAPr3ktg4ERT

Gg+QC7vCCo1ohDifNlNVk+96foEI9urLQuQCOaNQW2
xEh9Npqlk9UfEyq8imVml5L8Slu+5+Ex+UdCHS+k0nbB3LHyqSfbTnyiFVl1JwqM9Fe6opjl83zUR+kF

ao50IT+4IdKFA6EpQL6MUzJFsFyxU3viHfQFW/Qzz/+gQATW/UmuHL72jjR95EOIeOmhXTNeLf7vW0gO

wgqjTIYW+T0hPZWxTYFyZyfSiZP6HhlwATejv1Zebh
tYJqf/8qUKwbqtVLmzrB0EnyA4Gl9RPPRZlzQRhBeCOFlR8n7b4vhsB5/Spx9Ju3e70uyOcDIcNLBJEh

5TIY28v0tSCElCCntxZb1CBHZatKJLc2IV/KWHa1z7cTm78AtPHfiRKDej5zxKg8XFuAk/OO3JabSml3

8R4XxlScYBdEjUsYC9ONAH9aHLWt6upoYUB7qewh1Z
PGrszjHCWyxCitFI9s7my/8oPnyxeXum6xsJa4o6PLsyR+P2l7L4eOShmpcgQpu2UHu/q4yr0E4/43tD

yAfqDNPBtWh7+hUHFUGy7ZM4seWuKmIMTXI/tF+1cf7UAJA3CRgwUAMCwTFRqv/62uZDDLLGBkxIRxiU

R58DZxx+9BkTngDN0cga87pEK8/G9ESYibR6k1c25P
3ZTW7DcaMbAjo8HuDqYl9GzHZnMxSFriT736ruwLOtn3ThW8K79smn6ELgaPplRpfg01AQgmCK8/q9Mp

ayzPvAev3rq/2QviGp+CRv9NEkXZrLyKHjTHV7Z2hnUjTsx37svgcg7I/PdC5kkvj9WY19sLjiLIZGp5

GeohlOwu5ah7hGhmAcScRJxx0lcMCz53mGzd/I5Y9i
a77/rwljL5942C0dpr0rTHVuvVT3aXTj4A19qh9WTH2cKLtuhawKvSpjsWRPWCln69rLiHToG4JY7DqI

4UKNIhtNwJ4Vs8giEgDwSSRA9wUVJRUYv11ogM54d7q3FFFPKSNAFmhbf6R6iVWujgLoIUpEpV6Lqr/Z

+qO/B4bI/OznuFiO+SYzJ9kAjbinXNQSWlS13Xz4EB
oH636iJNMMwUVE9AVyNrx+jR7kr8m/Zk3x7CXFdk5FH0tNfRRs4RPj3g2GecJriqcpFCxuRNBy/+WSWc

m85UO8Wrw6zg/WAWlLMu2yv2pj19bnQC8OiOZJuGY5UD6wWNucwCeIfRtd1ckQJi9Siy/whz06iwfbvq

NBC8rnRGoIocbXlOgoTd7P/LmHsNiwpmHofl9Kqi1F
4nUtldiMFgqUqlghpvlye5z3Jw8LR4/w07iDfQ4U9onUxxpnrdslWOP/unWCmJ7pCOoKJdqT1d4nas8M

hSCjdwjcmGG85C+6182t6joscBDpwoh8NyakaeQa7gt6382ffvaWgQUFOtzLSU+qgM4k+wJJXLqlz9wu

nK0R92GNmkjj3SeW0NLCaA0Q8O/2gcfk+PwvcGt8GZ
TqxSZRpr/15eWX+S3jUr7R/QF1GWvOcsTT1rRarLm12+K04AQMtWfgg8joFf7fLUUjwHmzPHW64LfA+F

ebPFWc7WkORKPOHSZ8/Z5t2G8E4FS0jluU7aJ6a67YXkl5MGWXddfTRn9paoeSDqWgcMtMcRKuL21T0W

pC6ZB/WnLIRePnv9+tZX9PySSCuHFj76tJOZtONcxk
76ESRCwbJxhBoFFtfkmgC16zTpNyn6MggRQwu8pT434Iw25k65elXyNXkDCeFpaLwhB+Z8C2B/dVABsl

CjrLGngbc7jyrQTVQUNSEplTqs63eMaz4wzeM4FWoPNFOMDQaFdSKJqg9/2AhY15fV8R6pC0HqDnKI8z

4pVtH1im6pAAy0hzUx4tUsyjqzlyX/j1e5hrQq0Sno
JBwURNU66heF4kGjDyel9C5/3oREqkrJD0+fVPzFuxG7ah8IIVTtvlY05Xhmt8fxI5hfu1OoxtKF4boa

LdJ7BWMpnzvyRLjrMUOSUO8NEGE3U7QSCLlmezL6F5ttzyY0pZXwVqFgvVxpF8R64uY/J26ixBF/ExSg

z3R+ClvvEvoEmChqjG6D/o0QG3GlGasdJ4GNS0dAhS
gnHsIP9OwD7Y0djKqpX83KfY0fII6XgKMwbP4XthCz6iBPyHOaeu0OM5mSzV5eCF8xIX1H9YgAzBeEc8

3CKND2ipGsFHwJD2yAzP4oq/pgsmse7spNcv2B21RBuxfwky0wY5iioJPwp1ytP2EgYCN5drpGq0NTTb

0plZ2MrmC48OuWs01LBA2rxc5FtuT++fVZZ5PhHFSg
Y2VYBKvepLq7NbpA65Cb4xeGwUBTRn7PtDzCUw/T9G+zyNXyDkztojscy+3BXS7DNn18l7tzNeXBeoL+

4y/RkPmo+Vuam5QZ0syqGmsJ0yo9GqEJ5tdxpzoshw4bHXV9OVArdR9QX1wodbG3Pi8TJH+ayZSsC/u2

bUM0n1/tBrlulmmAdhELocKdDjEZckjxv8tu2ftBJl
ny5fghtjZ2eimryL/yjPHjh5kVybrz0dcEq9hssBleZK+VdO6nfVRK8jSxDcr1plnHI75CtVKiz0wVPa

L1EUx1cudUappu+oTGPZPFHxxcrDsSIxMaX12gLBMEJeZR6RgmdK8/b2W6obtv/Xkqij/jNo8YIa61dC

3g7oACxPgqvujqvLkukzVQN9xHHadMqZS3GA9dHU0U
Y8JttaQTuiur8HBmmNGf37wj4OUS488lOkKaBxdkXo4q0HVhLo/odnQFDyLY7YSXBexoqTMdRAyvP1UE

eoreikEwbIzg9+5MIVVI4m0LWx8Z6WC6vU+H5fZ2JQYN2qYbapxcKGwY5IOY3lPSLAS50y37pCelJZhW

b2jJXistPLpP9CCKvOSJmjcsTW73lLI85GhHFDTOm6
O6cQ8hsqtM6eeWZK2txwI360NbXZdpKUDLJu/hDIk2Qh++W/u00hhMd/YWTTCBzIo4LFUZ4XxGzxf5fi

4fCY+M+fH8ZMR7++tmcpyW4b2tIjEhbLkUf/fIcLAPCPFCXbCfhi+O+1uITvwBMrJfNRqzwSgi7kiraS

a8pn43WjhDCVysRIKYmB4evAmIPh5DTr+qjs7R3geu
A0exncvNQhOhnV/nxKISv3wjMgd84zKpsCzufcCvuCoG1mntlZsEzm5nzTQkv5XCGiylnZCVvJi1c13a

+8eF2D4nsLwC64uCikwPOqWggI8L/2A2bD+SSNfi+pXMPjX8ySGEvLL7Zz/ffZ8324EAA4DrHYXgJ87q

1edtlLgHCiC+UF291QnopG45w03eoGUN4TezseYoRD
38XHl5ilVX//IS07+80s0eV5Md1xLpRPhNCqSTwsoWmej9Lfioyt9YBHD0SvFVrCNRbEZhplFbecrmr/

KvF7XatoQxtyKropxXShvDjvFXiic4iSbXma+V1VgURv+9m+p5fPt+v//fu0p4SC+gmRdnSEjyEGZ+rY

L7Ou/1mWr4Z6j5cZzM78BOMxA8uKDFRLE7STA19AFz
k0XE5Ss24HKzyBm8HsP8xLudS/7FVuSIgTajVFw7lml4VSlAu3Je2pAMG5+3MTm+uD8bKzS1bz+P6fT5

+ygn9c1r40vWwrkgIchYIZAl/4c49TfV+s5X5wPsHLbGNYgUODeNTHmIwbmtmra+5+0iPkBhYsWjc43B

TxqQcFMPjhFpdFN7TT75YV3CuIVp+EBFMbC0WGGOuG
t/Cja3bs8W17vVlho7i73vWyNBcHI8gg+k+P70yaBN6lSueCrfT8YoUcyanpo8qJp8sFn4P39k0kli+J

EJADZmqloBqQ7HDaEtY/P8FZAgUf9EknzA9I9ot+gPAMqpuCTVqma87Tks+5zsE0Wmfk/t3cB9qLQCP0

kGzCDmIp2psk4doL5riBfm9Zb4ljwl4uItegjrkP7h
T8ZRQLP5v/W3TCWEwsZAKmsFuc7Hgj958rGskp8kTTMpZK4r2iVe8BeV2y1qxbQ3+MSSRevjZ73C9Jnd

HAiHPGJxE4UNsnlYL+0uvAdieCkHIGxtvfK5Vx2F1rDp87+P0CwlTT5d7is+7PfL3bowrbECyhD6kpN3

m5+84n6uDJdoobA67nHqNShK7HwW7xT8AODmnDeEqf
VUEsCtdUeI1k1WoACMjfBrKIkF75mzP5inzfgq6ocHYhPRsYkuFGbEIavaLs8wdQ3TSYUxmp1j65S8fb

n2m5xHsNFUbGTCBw8KIcHJTD53mtirMT2DGErFENJjYGRZVXQ/HS1yquZJmxXD9PejnFCU6vji4bPsLu

yJaor02YA8EE35gtCZpuZOkvZIr2k/kC2E3LgqiikJ
QA8/7H6yiyXRq2pgaJdcYWPH3lHbgq+bXNZT+B3CkR1qiW7v3znic6K199EY6u1qDz1/K4e84K6AZmUR

EdIO8xtjpJy9iZAa5Z8bwuGOW5sdWPMTVkPLgzaZCgW46Lfyvnm1sL2Ujpgftl+m505n6bcmzw3C2XVH

rSDVlZv6bHtTE8r16l2Hei8+0SLZgl6pFLFyGfL3lI
FslMk7jr92HI9YTLmQ1++ZuOEtERp3U8qxDfe6fG1cXOBkw7t/Y++t0//l1qJq7/DHcOW2Tg7ZmrHnc+

yh9Ib77BQ1V5UZMGGvsupFp6Dfq+fEMzZH6s7BQ+c5k4hSCGZK8KxtEtFK5F0DeQ5ZTwxsViSXCPVPw5

VuD0UQ1H6lw510hSQGLIIZwNubcbDZT8G6Yug31het
hLJa8+U+sfZC4adQ7w6ASTx11Sp0SItTSsiIVocHXN3AQoXU4LupAou8K+/6V7FDqYw/OUSEeonphZ/Y

8jMo/cVoksAK+gqTfqrFgjabe+aad2J/zjOJAbBhXQl9tem5hzgnNvboZ8UsaEBl1lkjeehj9ow+5ILM

0sam31bfNFviu/hxkwNaTYch3iUlektBcjd0JeYbge
e8LNRPRM/M9/T66XtR49TFw3olb56afYiVUXoToI1QRn0Fg/6AhbUYqba9d7enBQlzKO3l2KF/+Ee6Wg

9pB/vmq74U+xRKdb3t8n1f2LiZ+t0r7i/mLY8roCB+72nusSZXhLCqWwDiuHsu6akL5HpRkApIojpNff

dElcHzBqOEx4Pe1UIo8eNyQmBLSkAHpAzVbQOPNw13
d2Y99Zym6IJ2o0U516rYinkJPSVa60U4NjkAe41BlscY2BJzzWF+EP/DpjoJvPpuqUYqH42lSGF+QIap

zQzHovVYjM1TcOX1ZyKEvGp2v4W3oKw67h5KP7O/cp0cZz5oZaJWxGV+ryPos3pbsJX7+Tds0UDdkCT0

ropYm1tmn7IsPuwK+FbqhfMbA1pq6l1Zbkgp/Ao9Bl
9Fb5ov1YigyftKVMQAYQCNiraaXA6FMyXLwSLJua6uY0ReYM7KRuxkGj6/PwF+iUu1YhvS3HQg4ZWx4B

B/k14ytGozkjBhHRT0LKcelIze/HHm/lPBMI83YLlFwW5nZOz8ld0LkdZcWf0AxG9fW2LK0TzIw8Utri

PL9//Ymh/SnkGb9mQG9Ne4JpxDrFoGZwjV0Q4Xt4gd
kTCSqnIg1T5V/foY39Mpcyle/W7YfjiT6wdR2t69mIcg/HPHN9DfA3+V1QmU3mKx0VKwPQ/18MXUACUK

3O0EtPvuYjmTPlLmrjIBJ1spV5n5NtvJVphK/CsGdLnphcT1ew2rbwNTO25F2+rM8mkXd+UMx2AHA4gq

ANa0G4+j+rdBCzq+Gwqy6cFZstUFTmVzg+CI6MHWmS
zBLzOfTnDgQ4jwR4lL+R6EUimmrPjPZh6wXRNBO0iGdYWN4tkAcwToNOM6vl6//ep8nYvOzXdmHhbNkU

0XLisEuG2ndzJ49OsmfQVfY7TW4S89LML6yvaCDr0/m6WuvsKuKl7IRzk/tQIW6CPsKqCrT4b2nUTneM

DbLq40LOk/IHZIkITizdG5P+vBkP6mUna7+Veh+WwZ
t4IGsgt+n0SUcrzsL3DJDZVABE3hyLv9dc4UIKG/HZcByu0FbM0zfTp+9lwG7b+Fyozz7LYPnucXC2RL

GZNbCaMbPobxPw9p97MPkNivUHYHvMZCy3PQinX+5hA239G/M/MKOcdKBtEv9HY8ptxBBGxBo3CoyGES

pwnNbSKvpXIF8M4q4RE+QWkCA0d5l8d+AZZJdkpQoZ
Ssu5PlWZ9FOjFWbZx57LKVqPV5lFyXtxoEUm0QqEk6oghttxyXA0pTQT8ZjD57IXP1001ebU0MynbmU3

jRspRD7cJUGEm4iaEyO9Cs5/hM0j0vxgZ/m6HpuJQq2ahx/wUgAWHKkWPSb+z9PbiMKEyr6vSasB2mK6

IN7kzYjswyi8fLNoBhszHCv/IgjXJAQ1UGN9U6vt9E
VFSYHyZRh7Z5bAJZCXpCeAJWr94hRDX/BNz8WO//2fTFRXyNe9rr+GuhU3Vo+P0Uj9ONeJuY8WJDiwwL

JrX9kWLfnl20B3kNrJ7Qqk7LTvN6tPVuhW1eKFipArFajYbmJvEQba+xVje8ivMGiBkzNZKI5QLvL5Ft

gchocpVdlkfPy0ucQAvtxx6p5zL8eedvOAk/wrW0w6
52kHPs3Sodex0Ey81HFoUVS0Ne1rXfMZnwDyuksfMawDyYXkCICSIoi2aGM8/14yyBda5hJbo/8GKC0a

wY07nPhOsfNES+aNqe5CYJxpg47Dii3Txk2iTy/jQgyYcO3OiGpM9rlWoJyRdcC++f9sV8mjYglrc+4D

EIfkSd3Aoe2cQcewFu2nlOHeMaaBNkvkr922637Zm3
kmdGIsU1mfgOjeAenSOoFUJP9xY2816ZLbN//Kh8AfMn5pNsq5WGWX8SlF+kQzfZTRi3cau7UYtp/krm

m7ywx+EsBPbBCPckpe/Yrpl3trP5DZvPEdEeWruH+8lVK1D8PWSIr024rjNPLt4ZR089p6LIcRJF0j+n

82zCrhiW9p5/OZ3ClIee0TkPQ3SiYCY1ZLuI8gv5/y
Y41GuDeRYmHbkzLoIY3vgfsuyJK9EoryUXTC+L/JXmsvyits+cLv4eso1RobJCQk0T2XaY9UcFB3o7+5

enUpBrbKxZRf0EJiBA+L8WqX0/j6NJy/qG0Haar/4Wir1SbtFjSbAMHnXZIXlwYWDJqhTQIpdtH/Mqhm

0eQtIsmKNEN+6TIAvLeetleb83YmafG8kdoGulscBk
+CZREXEEsUIuhdTtBDVkjIwpPkxR4uDxnto4gz5Ar3qJ6P9KIeFc2PYnUsugzo0YQuiHFWMEmEtrw6e2

Qiv9rklXBlZXFSuwq55bHObtIV5WRZ6K/j7nW3vOcuCLvOMcdu79A264hM/e2hurC/Lm/2kf82QtFhYO

U7HjS8wtAS20/qGX3kFaDS2kxKjQz5mm0mDunio87k
s/8s3sVw0fC+ykHI0MRXZBbm9cXLkzmmDI4RxucLuzdUIgj7UeA4DxLqCL+NlO9da3u5FVwSGQyG6a6/

26uoYcbvcVKF+hVAZabutczT/cSqFkuhRn2+DpmQgsl0x3OfHpezlIrqTCz/E9EqLV1lijBHkS/ePGQC

KAS4Q3lgte5+p46/5H+sBLsrNvhFZezMDgBAPxMd9l
G9/ENAxNfRaxO6pGYQDVFNO1nItbW4AqeDEm1Xqh+RiIyfcVLqmYwkB6gLxMQn7FNmbLRbm6yF6d0fI+

HWY/PLSqj0P2jWN94ngGAcuduvffFRzcgLwX+jH5uZmEHhIslj6fUJXpSaxjsBrG342nCOioIDalu7Fw

bnh8I7mHUEnUoKOuvTPhwG5xxP0v+hpsVgWR+3VFNz
smvBPdfrf7mWEsla5jolcvJHn+nhP4FIJZeCV1yVevuWy6vHrGR8hh2i5ESeLptHbZW5KPyQZMDwuTW1

NAmaJgLC92n1pWRoS85wZsL6xt7TllE1LlquQ822dPFjTyPCE54osfu+RxFzodDtoes+N4gJ7h4HeXuY

Ie+dfyyjKEiWgBlT2LQoOnoAsKtsgnQU/E72TtpQ88
X+7ldcKj+Yi6N8Av3pEzXm4b07VREMKvdkk8Qi1gCEe6WLPCLFFYJ5pQv29U4C6DWThhOC/Gj9wf/u/P

U5X1SZsjLqqLHsRfBgShxblzOU8+kIcIPFLyH3YnemJVRU7VpM3Z0VTvFd2hO7uK6tlv8MUcu5Vav722

0dohOHqZrT5kq0KJU/30ShebpOv/Wn4wzoo4WpdqRT
W7zomUXrtS2TIHh73DYkWidsw8mPMwQgGSASr7/2KIKTUTXwhoTZIfEnBg9Ut+1ND/ZRtih31Bgll8NI

f+T9/nMYV8qP9FwJHty1F3m9u9YPwv5wNTJ3PFUvr46feP80OpUzuxtjOjj90/rvTTaLP/ZeZyyvy9Zh

6fxzDtr/Hn2ur3mmF1nRN6YGInL0Jyfhy6o+NINZ9d
FNv1tTlN1u+nNHHC2hW4Du+V+A5o6I4EPsTkE4aNk+HSFWUb6BAAVXFT13y/+1ePB4OfLC6jr0wlkI9y

bLGUk246Bs0hFQ032vmaGYg65hG92FkMdpdciTSmB4dxXT/P6bbKMmPPKHmWmqQePgQQuXozc1u+blKg

zMQNUlKQ3b2VtVLGkeu48Q8BXOLe53sxUQSazElANx
Cz+uQhXJ7oE+752KNd35XxgVc775ixdgg4FsY1pXGhrzKT2GJaefmL+NkeWP/1d+L7O8YCdgqDNAVOpj

7ENfed/Y3Yt8XcGd5JBCeRaXw//idSCdL82b447aTsOzYjsqsQQ4AX7hYLXFTPiQnCP/R3j62Lf4b5lQ

LpwEN2OSU6h9G7/7j8ur+t657dinAvLPmGKrv+wtsW
B8PRyDjVUSz3kfoL4X2qbXnsV2NMZHkva528odOjVk40cWpV6whMtedXGTS+Dywv8kAAnVkEuN5hE1vA

z2owPZg+BRmWSB4HTy27SFirD+a/FAK3JrFE+kJ6ohFErYq2k1DpHdtOvac2PAKbsbw3E6xmsEHaodsK

mL/rWxpVA4UwiD3jO5BcBcqOfior73tkcrcw6ABlUH
9585lNxvvRNytURUQlKt0+VtI0u9TGaR2jLqfFqDWf+BbfvTfKIKMSCvUMjtnHs7C295wizlw0d3FGhT

cP6V0kcaexDOCsk2VRtmLntjoM1kMfimIj01nzLkJPuLdnQYY/6CaphSVVnOcfcEoJbdz41T2EkFH6Z8

s9OwXTVSPnNsFDFjXHlljosB00blTlw1UtyL/rJYuy
rtBerTKliIkZgiAVliu+cUQ+VMkP7jB0iE0Pn0a6btBkFtfquewzylL77Ql7Xx5MHKx02nNkz+G/huiT

i5OlTTCTuEUJ8G1bCug3Xv/e3gdrwrglkcWbnbrrOX50s1EKORyeZWOdC8T2wtj2bU5CeuNpLCw7vbbf

y44/jl50+0eMFcxF9zryhqaRJFY2ez8qNrYior3frD
SbYaaRhwi3Gs341VJnwFNUUDT6cynnJEbAFj0CG0Jf+9mIWWv4iuk5Ksn6T93MsjoxN1nHWMa+SvdUkf

yKWV/UNEfI8ccFDPa7fyDd5KYYNNz3X/wShmO0xgW00wU0jyzqYFyJ7T09pWx0vROz9HY0jXfEnouidP

YVacDhhjDpIYO+2VIL5tu0EarhRUfp8LQet9Zdjh1Q
utVZJrKEO2fIYW3YIjSnZw4qev+DrMZBRvzLxpoWApK1HQR07rmlE2U5vDwH3r807J2O11ky8lU2snxp

JRUGi1we30Yyl6nC65ySqAvmG+G0mraq7vnfQYYLMVbWgkXXWlmPHpLM0/pUnUkPXAojfZNsEOObBzRb

BruVrKHz/P2gP7tOO1EDh7NjM05XY5CQ1hPAJbdWyl
CwGA2GsHx7vHfkV11vBOnVOT/WwWcfDCZMo9jvK5c3cKaNBu51hm932O51VmKOEeBA3Oye9DilAVBPh5

SKdD982DUGUosOd8wArmF4NRFdSOpNKVTgu0Q2c+q818yImvA+nxfCKHvHqOD7sT2mYskfuJ+u9rUxNI

sApOCljJXu1Oup+O8x5qUmS3prNevxvxt1oKhyODXO
x1RWR6ZaI3uG21ocCH0RVZ+0bhB5H/HGbfGIl6CvO+SEe6w9XmJfVVz5l5ghrC9qUX2YCy7fRMfb7/o8

JZ3Ny9HpKIknEGGXScSe29sOqi33DmRQnr7YEy7T+urvqmk2MxbFX9KPR+6w4CCjlO8wP6O+ztJRW/9T

eSMxHb0dgampjfsHEAq9bnezKPSzlsIJJnELSuK8Ls
SENPMwMZurOXqAZpH5JrXVDDkUfoX+wrzvGVSR975U/LTq0u+2Ice8MF3LxX17s7dmDpZ1/V5NVotnVo

3COxCoMvHzUJYI/dwEV37hNKt4u9kjUWnA2CgAfyb4P+WOaVU3Ask9xV2Wykm9xZj/CPqJ8/GmUDqzXI

ZnTbYwR6WrRsclhN5jONLeXzV2sCri55t9A1/JuYuo
2YGixbRwWQvRPLTTriDYj5q748SYoZsnwCxdFNP0hH6vOVy9ugTiacj26UJMxU4e9NuIWKJDX0Tzt+kW

r5tcvpkoMKSHyrohNfIsQmMX2NTM+gr9HZjUnek/C6alldOEQlRgxp8axfS7ccrZGXgVilp4so177Iuw

cbrUii6YI0q6UWOaJeftJOG2fdU71IzgBD3Osnk7UF
z7pFMwz9yd1a2mt84K9RhZypyJ9QmH9SSBJVsL3+++pKZLVE7fajUNBZ1AecQGnz7c1Oye1cmqjZurqx

UtygFYWYdX1CJ0/NLOgNpVPGuKyuYs8/wd2h12dQobrc5k/jqsnuiqBu9YDLpsTGdcxaD8qjW+sWZp2j

5V+dDfngfrnKDrDqWlTNd1wnizOut/r+2kLWBSHx1f
/RCNgG2rBbmCqFBaWeZek4z6uT3ISHFhXIYqa+X2xii9Q+oiEhcKHBx9FJTMBw+Bz+3FH6aU1/TGJjJd

6xsBBVOc5wmRELAJFG7jKfi81tad9BECbL8QX0ZFtL2OGx/uCC1usnptNT8luV90Wt9NET50T+H8tcI/

MKdbe4Yg4BUS0jXdmtstFcd1vrDQ7JdIsyf5pyOQv0
hxqNHzr73egdO4GHj7Q2iDRc/7wVO81WwT/tbrGSIlzYJIi2BEQA+mngc6RGF/5ZTJ5wPF228fUG815K

AELQz0yeE8oGlHdfWII+l/gNPVRHzXjMC1PVtVtxIQcHBNmjkv1wKokrd+GNJXx1dAEV0087pQlAcEBj

6rzZW5Hj4QgVSEMepg/IgQd3rj4T1Xmu1j8mj/YgPf
M2HDl3DSQUa0A5xm2zRKy2+0leDWccvNj7JOZNHg+siHDy2bZl9k0vgj6ldyaQ0nF5JjlX1LzzIFk1en

z6lLxC0ucHG+cFmYeDDFfzmX+dfIvpVkG5QROXj9nhZ35lsThNstrz/aRpxEXEd14EpPN8AGJFKWD4Uv

Fnp7k35aYf5WfaSXVL/WsnfwHI1b57v2y59c/NZcL+
yOjYnf8zMoUY4MytPEN5g+KA32HV1zpxo3NJKYw+pokPurUqjA6HsT+eDfdAkci+2mWEtwPcgaQWBGXz

qeIhNLBcbEDuq9OiqlsMjSO6BUbfyw4AiGDMKA2ih8BNxv/7kVjbAKwZg0zFNVPLehwleTXSAJvslzJt

XGTXFqJGa/Z97dRVbD3NWKZNHbGyPhU6vQSErIqbIg
mMIXZqc6wiwwyqV5yBxfiMreHbXxg132FqqHcVDvJc3v7c9KCkKhW0n24VXmOTBcMQl3zhxrwhRWf0r6

rYdbsoy0jCaymve4vIPyjRjq9hSpQzY8SZRRpbQuNqka3xXXSezhZAwsi84diQXzgDHYeO26IYFCgTEy

fCZ3tXKXK54H2IeHnsWOX5wfa+aHhNI6PBIq3nF1ax
HwYIsUwFbI95MqrnaDAx6SM36AqvHrS10la9KzkgGEQ3QrAgKUJ7wuvQUvxquH4I8wI3G91MfAL5bi91

uZKQAT1iFHy23M6Msy3ZUOfn5SoEkLGFWefg1wOBQ72Ys75oKJXvv9xZ9pcltEpGN31tm9RR2qcHYUni

Yvik+ugN9DE9sR4MGIZj/qibQQb7oAJu/s0IpB2ubw
3P3SOlYYgyB4MP2QcofeJzKco9Ez6ukRx9rkCs4QT8x4IawjFwR0PuRjZWd3tb7wCDZZKbfKv5pdu2j4

mFUzTRpUoUzpDh5kvMo6og7qHEEGzhjVZXRgglTOn/SsF/DX7ogjUuWn9u0LFnCtuAs8901pS61iO3FL

h39xEgEHuSnXAjeKSLe90ejtjMcMFKCo3ZvvwebH8W
/CLMy0AKzatfO4IJFDEZW7LrugeRhIWY5ecZYAqasOC1JqhDMKwSXCwfWfOb4RVTA/FdiyvGbUz2f+mO

kSK31Psjwezlfo/YhcofbdsBe3xRt80vQwWiyH4FVIllHpVP8fWJdzehAP6ITIVj/liwd7DJW7lF7lU6

v9F3TPab/AErjQdNM7CbVoczL+GlgsdIl4BB2QQ3Ya
NGQAay4SDRn+8bIMr5Md3BkBSqWASWB1pXXlj0MYL62x+Jm+DjJOd3F3EBll+LiIOQ+5rx5eD2SiPx07

gzND3kbFlRIJV7tnbqr+fA6ZNCOdRHNuZgddfYWHkly04jcLPTGu7G9e4hnREMEib0wIcnhTnVqqmTZs

tdAd1wV7Fir+6w7+S8P0nmTUTqbnEMHFAvO+BWTskf
yqrjD9I/ksg8iGcZ3nB4gLBu4ru207LW+Hh31gmmx+GOHOznT09nMKmw/ABPjAj4O6Hiex6YKtvJpRHZ

BVtvw4Vsi12jusOg1gKtKgEl+DieESYI+tU4M8c8c99bfXQTCuLe8Rl/AiqytFgrv3tNxUzdQQ0zQzRp

Vqlr8Y1QQRDqA8BUHxQJmQokiH1g/8xcysGAWAlsL+
jKjR+VPUcRaapFyxRuHlSCIns65bG9ji9ZC+r6qjhBskgrcNkrtz8r1i5Z0KPbxzVzuqgdqaqUmzg69G

ANsNXYn9lXMrFfmkw6VN2S4nesmsf+ypzp4Ygi4lIvFiGGE29inPymryC8k8Cm6DDBlfmf3KlpuNH2js

2VvOLfgvxtRaYsZOQzcWiIAQHVrwdkUPbQg7pA1LQl
R8FWLn2At4LzDUxKC+5uJCzFpIVBpCuZ0GnDDiwN5WZvlf8Oyr0DNEVeva4lN14E8inrG7DOyUzgvqrU

XpdkjeGqFx/0VJg1vzfadxykvCsPp04vGEfaHkNjrVhH1sX7snSs2I93UzBiMC6OgJC4m5hfpe3y3jt2

scC8sNN/qlu9L8m3s2SQzDfyHIG5ezJwpUhE91pnNn
acc36RUkKmGQGvjXF9pE3mw29klXucoTTm1k46KUDj5cj5+tz+rX7YO2bgNHNKDF+LKmM+B7qoi16d0q

1UxqyKdFcF9gZdF3N2seKE5FyTjpGbdF2x4OCyc2/0oxrm1oXNk9Qd4P7ymbIm161IPQ4GEVivJmqiEg

SKBkmraBU/USm5TkKDAK6sdq6xadPD7Ii3bOL4dfU9
jOyqt4N8sNVKalzNKF5niP+Vg9Kmf2n2hFCenjtMeHW11ihB263hQ2jRcOtPvLrKn4wkP2IVgEVlQE4e

oJdhIio8GkANIB8cj5xig7zn4P8TG9a9pAB9h0gdzCfiEeZkhdezZ+P+VckZOYqtZv/9EE5b5TNwOZBF

3LsMtHBFSJuB1grSPgquX+BF/xzUyAHJM6Mwgls6IX
J0hGavw4dWXb8YjJLrfA80wdtncrNyz8RNMGOz1e5OU967+Bcmw/62fQjIeAZykyFRNhdJ752kahljFH

ZXB0DVk0AhNonkBedmvWef9oExSmZXJLFy6VArYJdz6lxlZslRDRhBqdIoeOpgR18kGK4C+KsbuKx1f4

eYdMg/V4PAUaC8B0nov/exkTOHq43rXpv9ZrzsrJmA
FWKX5A8rxA7DpaZpkxFcHVaBnLVIpoYWc4I6GZuqjGzCZNnHYZ7iWxZGSXyf+KvOtvGPfkWrmgB0pP8F

2Aci9fuKtrQagi4E0o7w++louihn2LNg/e66xbb7y9TydhypdJQUoflnegLaJhplur3kfw/sBdXdF4DU

eS0vI2jXXZEjmyrVeyfunQ9sEOokRW39748XSxiWr0
J4Lo7ik0TGxs7MC3Px9aA+MBl6VjBOHw39vhbn4TLEM1kC9Q0ZbpqZvA1ST9LgiFDdebQfPjhPRv/Lbf

BOVqTlI43Gx0g9PsWQ4pvSFDVLsUhlambS0DMHbS92IasCw68nsSJ+QeypObyHnlFJoF6kRSu1T6xtV0

kqWiejOLQG9aO40Djm7sqCdU9/tZZiM+2bU7HQPX0n
DTxMLXtVNy+V36jxaRkI3RDV/U7ciXMkZ+1JEUVYevugn6UkK5eakaFsHPYxA4/u+1fa9nSfEQscXePz

/xgJuzu0Qjgk4xHVw/Ot0Qb38Zk2issUeV3BksCRsn9XvdCCoE/IrqOvw9iaiocUa0Hp0OHWFW7G8cu5

mtZWg8Fh1zBluzDRkIS95HKNvdDwYT2Z944SvUl/Sm
BYw7HYt2NqDkHNHaDPlDth66WwiPxlhiYBpH+QjhPNURoNSoFe2245EGMbSsqThXdcCAH3uqxLemI3eV

+dz/bwl5/6/IwQw3lVrqHz41yZfHr5T4m+LGGIUb/eq6u8GDEtpIsbM9piGiTrTYJ/n5+BhobFnxs1lx

J1OuKQgmV8HbWEupfKAqJMgDqBwaqztRa4t3i1GrZM
SET6un4amyEd1XaGiYTFB2Pk28Cfe/rlvUhVdVAzDgj8PaBUaNjWEnhM1IsEqoThGnh7Y5n8f2yfuAko

lTYcyqwoSrOrQ3QE32YSdaE72YWRCexCLJ0nqX7Z/NCKOB+AHn4xg78ik0DZXDREHm00+2JJTujuL0A+

iNM34/4s+ciZoXOh93+XMbo1DuttG7OL6oRNTQjhRD
kAvQiFrIRnHFSGU/LzpREffhLvcpPGRm9v8GTnVX1kQxQuI5t/g2j8ZLrVngQeJ0K98wqPs+mVogpGXw

T7d4Lp6LM5Fr3XThesOekXAtLAc0nL9ILyycBnWRGxURAZgAJVC/Q1eadRawyN60wVPbTGKgAZk+rp8K

a3ZE0iaGSToeVhnhFpS/Ck5T1sJSyilD9K3z6hTRuD
a19Hvbkd0LPk+271IWrzeQ3S+T22I/i1UavivJ5j0ZgeeoLmblqR6JR6+OjospCW+kVi1Bbev6BonC7Y

py5zex5fRNfp4R1otLwHmHl4L0SkWFuD+XrEr+AhXy7l01QFpDEriNei1Jv2O0pvzGhMpEbefbcjkL4F

YzTu3PcAk1L7t1aqNwdWa7U/ImVYiRrJgQpmQI153s
VSjvakChedyKr7ggr+nD36AYEvFoR/B7Xf0r4kIdlD5ykVNp3gqKgwXgauYvTrHCN0Ijx4uBHUkrP1yf

IQiel+ffihkell3NuE6nQ/bDjzfpnx8ZpXS1yQHMY1HK7s7E6UErABwGxaL47X0W8VtGH7flvqL+fPPF

/18oI8QdTTUfnDl4anB980T+qX6Tl9HkoqaqBjSZ0Y
2PHEB5Qqtu0GnAe7+vTB1s03MXUUxawnr0gzlZ6mgpCuY6ng2zYdHWYlWEvN4E5m8Z3/Endxs1bcMdEv

g6XK+h+RZaBpmz1oGoZmtfgcOLVE6GU1sx6vJ7aXM0PjwCWdBBaWwkl47kzXNldoPCfgZfwqatm7b0Fl

MA12C87rV8oaUmTbPRp2iCrhnK68gptsSf7JJLOWMK
he1XTh81/Hf8BZNRvk7JZ+aOiBe1uP5rDhvzjpEnAf0jEO4PNYkC0l9I9cZvA73rN8xfmlXkws1Vt4Uo

/iYI5Zri2920cFBkoM/a87jv4fclKMMcWxJf+Npfm0rqEcks0e693OUmFl8+FGlsN5RGVbUBVg4Q/HAW

jzC204Ii3DnvLocrnMwWxseMO5bm2z1UEhlUcAMjvq
SMfnl1KUPhc9fW+nJVM7Nn659qP9aVNbkhW3QDK9acszrhm8u44KYzvGzCZkDTOM+TWBxAP1b6W5nrIc

vX8kADy0Ma8WqLmsHIp+Nu/EIAPUKoJCdJ5BnIfu3b2nR8nrt1pPpJA8+9XEEYbgz+Mw+l5kFDTzN1G0

uW3dh26xypdh+XRTHhe2Ot//uZEDZraehVLDB01huc
+nmgp7MsfrCUWWvblTMrW9/ElIbDhSxS0Rl3ZE2xOdwk0KV7H1vcE9M4tIeC559+wlgb9kmXqoPAYJ7W

nIU48+z38TdCgvaOiNGEJJ3ufdJ1byWFFy3USeynYzzU6mb8EMiFMogvbk95uFLxtxTdg4DR8xxHTI13

elU4GA+GPxfM6Sqr+8ZKocINSzL5LysHVC0JesqS05
49lz6ImRbY+mu9JnoIdMtYxhTlfFBi5gg6FsAuGq+HtJFUtbex2HkFWjXfjEUbTZVb6wTUE4mLz/qLVP

KsQbE+QxIEpqWAsSgfZphurwlarNdYrYJiz9iCF1gg5A0P4Xq4DMKb3Ngldqt1YbhRGjfPaePd/9Waa+

YDrqLabytTuE4ydouRCiDZwPRCQwxN+11EW5estSe8
+jgwm9UV+VMkfotXmCkptSeDj1YqmFlQ11HA2JNVXpkPMlxfugsk+90EMhG11xmnYCb3FU9Ca5BOGXYF

JwCVnyFBkLkePTXqdY/ky8w4AyRBk1eKxhXkJYMTJiCBW7EoBojtXuY5HB5+BvBlV9yOKl/fDrP0BZW1

Y+01vWCrO5L60+bjeNuhRnA3YHEfDAcl5jBiFD5Qpx
SVkhOYmldIExGjgHqaHjtQenNjw9eKQbxAva+CQZeWNFVOuLiZP1ZAhlgvcOnkOm3VNpDrsTE+jIws6L

yGIaiht+6LVPNlP9NFiF84BI83yZ5c3+/fG+llXBx6wpoWbvN0ZNkrNq2ifE17b2OVsz4Gp76sXPyufn

bIcbbiWfOiCw4gUiR0URS1yTMcLKDz36DAl9Ir/OpW
P6xR39OKL6STk8ppPF5PU2+Mkc7l6+bGSC/1qeX+VsGKFG0c1h6Mbo8LjTNHy5z2h66jysBgvly0ddYg

MjXTHq4KTcwsd6F6tEe4cai8fRenjnX14VkNWXH+sTXkFr0UBnmV8wkHWsr9wif6TvfHCTXmEq/GnfeP

Mf4JbaegvdjCM0IQ3eJqqtwZB2CgB2wRzxDPoefhpe
LQbBHlEESFDNQ+k5iZbP1rYK5tQhv5xY+1slJLtsW5xopSQI5fI7kurvwm0/b4fCsgCfJ14mIHC8GJsH

z5HlSG3xeJhnj7S5+8LiSmWVQr343wsKmcukt8LkMaVRvFlJKtWTWsxjqT+p5oTlodG4g5fIyuJIjByv

0W5+P3wKxBguLb2mGlNXqKXnonYCLhD0QjD3YBN4W1
VPIzIujsUEHm/OKoWJlbek6mWFZ3+KJpnN3jIhQwdJUk5UwNxJDaJMBLloOjx1a86jW7x7wZg6YQmNMc

+NwxRrudwA4JRnEML19RdTUbetV+cxNWJtAO9tZBrc7kFV68LnEEZwrz4+dd4zWR60JdpMDMb+ueK6CN

h17hYf2YV434y7GR1MDAty0USecYZFgq6CXKLbep+T
1mCBeVrRMCx8VeFwyxtVtuq5fonrSgd29rrwepzBKxyw4lT1hofHVi7TNyYSi+q1XoOrF1rod6WzDKEo

irHcWvDREd59rGzXR1hfTbS6LcHDIin6gXYeNp1hDGFokPGbCmJ5G7yEbt7srcd2vzPaM9COM5IE5oOP

w15K6shF0IyzOwtGvEVZ43AlFktYXGQgUpucqRtPs1
U6/h9VR0VRQE6DTqRQut1CYYQlikpbVaftBwKv1aFXFzRcbn3StHbpGjpY8t1G0C9YzQEWVCXDONhjNy

8ehtMC5icjbCoXAOhFpj1wYUUjhesJopxNaPM3nQSZ+bUH7gmVzkG9DeLrJ1pwrXlSovnD72xHJhks/M

jRv5Icy8smHaMLH6nI/ytR9JeQ9UqSVxQsIigLaeFk
Cu1lW09nNV0bmxba8SrrGbpg6Fa2xs1t8YMgbLqi+gbxisLQVqkgqYeTwapm0gGbKqFxQueCQ0RK6a/r

NpcZSwhn9D3Qf4faz9ERQpGHCfIOHm4XyUSL/rd9tphEK6GbBGj04O82vbyrUbflv0+UuzCq9surcct6

YN7MSqDl2PpM92Lsr1L7yvl2c46Z5kPhhKfFgauT5W
jBSJMor26xCg6+6+YuMdV8t3sCg3jr+kW5Cfv2MHFfhfaaJr4bhmc+typ+KCw7J5AStPIhL97v6/ALNr

G9a6QhARCwcoIGKQtODNvodGHQ+lv4XtZoM5Ix66T9FmMIicCOEtZlmSzU9mll4CkelLKov5ZLMDYDHX

2jdHE5Cdtgd/TrO/3HZQgB8lZ8WSXjYpM1B3aYigwQ
aqeUERk/DgIrGG0uXGy+h920vR+euZsVm9v+DdD6A0d7Nyx+JM0X0zP78oFvuFzpjMVi9jmPjnH1MT39

RQXBXsmjmzIqhWRq9MMYnOMoVoUgoDKoRivpO1lAAnptbinr3oAK52ulJ0bFI70Ud3NdHWLu6BXG2nOw

BcFhFl6P/lJP73b7ao3KrxqqZkRTqV08PZfPFCH43f
963lblS08r71HdzeWqrb3aWtNaOOVwM0WIuk7gqWk31dSFWTM+pn4tqh9vGOMAUVGJYkUdhZGcHgBWZq

em+06Szu1mkuJNj1/ISlWvDcagEio1aRgKZ57ZxWrYvWLFodtx3tdDO8Ttm9FwPNdyJ9XxUO0osYohv/

I+MlNYu4WWsrS167DYMuh6V14VJB5uuSX1cnwNaXrq
kD4AEw//fglXNG5YLJVWUdAcx8stiIqzfal7s+Az7WjoBlxJt8/yHmq5cVuu6Hwc7TYYO4yf52LL/Gjg

FJxxkqSNYvBBWS6b9hTclmr/9ek4HVfP+pkfuKSS/O88H3oZVdCfz79qAY263Rewd37kVzAe9pki4na9

BjXA/FuiiOeL7WlubsyNg5cGqc48JJtTmMkTf1QKqh
u6azmD3NGLbpkYaBN3e2oUOq0tUQMhwg4ZgmMXpvUb2tnsqansur2aUPA/XdvjAcM1BfR3YOL/ahN2s2

Mzn50t2hniDdt/Wm25Rgh1MC9tbOdK0PvjeLz5xjXUXwLw98cgAnoNOkoiRvbuYL84VEgCCvwX4DgkFm

90itgdjHVRKt1nVxBsyObN/GP2WkQdAti9ngD2Lnp5
F+JfTOHfqaKtCS00NX7NoAzNyrQhN0E47Jd5asnftZj8cSHjlkbsxcyOWyWJLyMbVnMbojWlztcvMRgx

nGswOJ6VwtyWvw+wsinx1ujabUd1HoFRxkqSo/+JWkuRoQsl+bgojBrzuHItvuj1DLJveZtPTtFPeUiw

3G6IBeQWxg6Gfc+CXruX5FEojgYIaMEvOCeNeFg3k2
oGesOTToiwhx5uNv/6n4vxrN06o7GHeHX2GfjuuH5S4CO4oZFSyiWLAf3WWiLEDhMlVZI2Dsiu3iQBB8

IOaejQRJuiBYj5sm9mbu/bJbu/8PW3t/A80ZabR86qqqj/so8t1psufIpImV1bu8lR7e44/fqsqXdhmI

LzWYPqyN/4D9/BV5Bt3c17XK4DFwxUEMEuWLEdJdoX
96Ps4giyZKP7qwzGMYMl6dM8BAezR9I5OeSt9LNI7/6PJZkwdzeEi3SuUTgLDCUnQ+IF4hKV3VUZTKAN

Owrn+upLf2Tv/3KZ7y6ntYTLyCow17VUatyZ5xqWKk2UQAVvOlookQSmX2GmFSz8wHM2gFydohwHLSpl

Ecd126E/1Wrp3NdL//LIHzer/hy6Ulw5mNakEaEZSH
qqrSXUmBvXfJsO68uHs/U3+aRXICbRXwXuT8Fe3WxyTzsDtPEPpuOtpjaD2JBbwqik82meU762tX496m

DcHPDe3FfIjdZWIU81ThW4T5nt1MNSlSylK+vcxVCZrDcScbFB+YQSPvgE7NXkcjCLggYWVq9L/UQODr

dUQOIZi8TptI/Giuw3HRRFNmuJEaO521enw5W44due
ozcIfRnN8keHWH8iRl5Y+RKjlI4plG6yJ9lx0/vEo61dqgudgpswiO5/es7At3hlciKkMxjF7wkZ2nfL

Pvgbt9b+YhsO1Yj3q0h5IjVpytIqLsgh2OkHOEwyuHQxIBiiE3P5cd/qPPY8zTfqFUmXP/WvURq8wHgE

EZnudh/5RQdOZn5uiRvieoCUsvGKukjq7PXQQvvUxO
vVyoV8Tbw1UhWhymzXcS1h/sC4FVrUVKbs210Nj+UEX8LnU8ghaq377z3aTdD7rLHKNPdupofs8jnEEo

Fq208cNt3HAA9Vgr92LgRqIWn5hq3ce3JI1YJo7GubMZA2fW3LyjJbvlnvsT7tCvNFtnTH1QRGmSdxzM

kgVeR7YvR7N3RI1UBv1zTMAtTlTDCNrstOMmrmBvPc
b51jvXX2HJ1U5fS65gJYphItOtvf/X+7juEkd4mG8/TyfrxXHpCNTL+nEjsmTX6lcdcGgAXiYOkT+pny

ji2S9Uro0A8ZdkF1uohmagf8p6PRDG0JfItnCPv+QmKYpe9r2+ItpR6lLIN1DbGEU186/7QOuhH30p2J

AA2a29vN2+BX3vhctLVxZT0sAZY3ryJ4JO0G1M0EF2
cYuP6ZTUdYfVKGUxr5wYaCrlDj1KE17mdH8RG424sUyiqQaWJ1lBxUGd3uJBGXaL8bzXhteKBvpuqFiM

az9CQWdqurNgnQRq97+P6wWcLWy8A7pWlnQN0HPaw9xQJRjIASx3od5C/2dZsXswl/C1ZqqxS6AfU3fG

OjZ8iAblVF8Xddrkm3U30Gavn+5SSrAhW5Gyr4wQwq
S97lPlWhXVKqr3EYTptR5if8PYCaxOlWEQCX3dotyvYA4FqrIYx5NXwCY5KogC53zoTRpoHhlH8tlRzy

aMyNIhFweZ8G3DckwUQY17cw5wx+e+w4yev0HjP+oes2ZY5k3i1uye3tVuJHb5D1Pw5EKTo3txHibgrW

0mWkL+gop9wur99zTfvRRuxS4MkoWDgw/LeHZeFcCl
gnS2zcg8WOnYsm06Bsmm29dMAg0p36pIi5y27SL1cYE45YvPpswVZF901cGd9+BTVoAW0+g0AXT4JrsL

20RbLbyYx2IaWuvg9PwgxZp7LRMr2v+QfKxLVw7aBrtMiD7SLfqWL504cbFQRsGFNEE7CUPBkwRYcIGd

ax69jghk06DBTssUHfuCatfrjF6hdboVDqCLG+5sdD
hgZDmk0rksWU9DwE++kUVUFQqOaKgxiymDru7gRx7K3Puf5cek96RYMkluUcp+u4yFIXK8h9cbWxN9Cp

tRWi4Y2fmy7f9220lAZ+Ct4sCV9jLJR4T0ST61sWYEdp/6FkHlT3o9LmsvPnPdBf8lwnDbzek/e3rYUx

mi/GDZsNtBisAmPeNL2qwKx+xEo5+63SGUnshkZHdA
GIXR1JSsHcAXCOjiBWKzxieKTGOgEfsaenJF0Tskbwkjz14fLtg1ir8lDihRvOFxCCjU2fKeuc7vhfhl

yM2uaCvpMMbejkJVtHSw5Qqhq31yBJCSrOReMAXIwVrhqZ2wurxksY49/8e1q3NPK3UO6bigR4z3512y

vhsDHKnN56bSceCz/ADU3fbWf5L5+9mdVWfZWTwxii
aXaxmDoS4xyeCnM21zmcnZR88coGRrJQRR3TtMJYv8IGX9fmUqB9qyN7C/yC3QCvdRu6uwm6TFqzK558

v661zyvQdfjQyoDIHQ3ewvC8tnK08m0WTDv2N5jmoGnMVt0Mcq3drrvRMeJzHyWg82jTYQzGzkyUZyyF

vxr7CF3zdaR1Shjm0JbrRVqd4nJZYGuDtbwYaCzhyn
EolHfrxk/cT8C2AaFfyAsYiDg3bYHrwLjIZmyKwp3XCfqK3IiHFv9DE8dNM1mRbKp5yqxgmAx1MS+NJh

/gLfVxOgV836HKrF57SfB/oB7EJsYgWIVhTAvIofFJXr2Ddpv6QlqdFpIALJjrRnh80HHYk5GhkK3QKK

lQQd1ZZgbI2q+93RS3tXhzdK8q7I+xSMKp1X0XR4Rv
/IKoxkTM7FR5UcSvgpW4uxH/yuBMYLsYBQ9ip458ScMk1gYAFB7jp2uUwybsdU2YDTT59Qzd178AN74t

b+nHsuVq9wiN/EVyTWXYO0Rm7oNzf1bobpzo+FCuLtzS4/RC5DbQcOqGbiZCbp01smpCvmBy2bg2lxXR

LnhwGd0f7Q5/T4YALbgitg/pGLrJswE83MI9kb/x5y
NKr+MnYvpmqqY/ojfdnZBMgD7Jk/tKy/JvtyX4SbyS8WttOU+i71c9y/FOimtdaFSOQtbAkmxiX0aZ0f

2Hthb22fnmAXJOH5hxrtlfIWa8R71fLTLiqtbEd6f+FrThxdVoXAgiIYbiSIO7Y2zddUrnvTxezlbGUh

nwQUyUwfKgeRngXJsb22OJY2yjt1BOjnyad/6OYnQq
K83TKYnBu6DOJhKYvcxbeaiBFfXBVnqYwIL8KuX6oXG2hAiVNFWwQiYQraRQoQgaaI0VCGYZdDVCpVbS

3FdRnc/CHM2mXMvBwahAgENTV8R7C7eiu205suB4s9WLD1L572F10LLFsq0KftFf03wyU7uX94mw4xmX

dQLbqHJmYdvyD3k/TcRqbLJhMtYiqpfON1DiBZ3Yqx
Bg/MVaLZeTEKdvlublZv/xVVFCmkjnkgYwB82UVf6sFY6ixDc4Pvvx9wrWcuwSaSfBAFCurAxOsC7syx

HpQZw3S56rEfPUYvFcuEtd9sTbG9nsjssGk7x7R2oSwrmTXcx47v9fmirYsd4mg9gkTuSWiN93i5bX+x

G68AcZLI97Tk1ODCvk5c1wnPK7pzyEK1w7/MqwR3f3
9qeHkfGZPfRq4z5V7Y3mjmy00RX/jMwCKQo6KChEyLxjetUa0DJemkIXkk7bzEKcSZD6ZcP96Y7O87Zl

j8NkLtm5Q/5UkgFzar/5Ni4AfU9J1rUyAQFStNl2e9YI+EjlO7wRHdH6w6SAnE1b7QzA1/XIZO42fnXB

bly9mGDGPzCR8FwHj8fYbTTk2s5AMPChrby9D6iKrd
oh8NfOCszZrL1iJQyJAqZJTdjtTIsDlDYVCy6wxNoTjHI7kY3uvJhCDkO2dgjY8CtKg67Z1Vmr/qfmDh

4feSDvLp2XE/V2SZwS2mAOCh3nRfmVUw0S/vkLVGcn/ALAnHUtmeL10TiJximozYiz9W2clmyC7ufnJu

iGV/u1CN3Iekxkl0g90efsA6+aGhi+mpwSg0y81g0P
7e6cY/fa4ETxEnk7limiQ7CevSImkVtI0Ks+4leKvturiKuQx5+ad/Ill4jB/0zA9Y2BHiTtwux6gXDu

rREzu4gXJVR8SvGpVUbE2wqerDYW6h61iRDRs8aAd/I1sl7tdbF+2pY82bMgG470lxqACv4SLg/1t3zg

Nvm19TexMQEKkxf7BXuFjH/mNrhNBCwU2Z3uHbX/JY
LdiMssOcPM4atMQU9LPMGjQ3W8dpH/hPkXXN1iOuDvUZ8cmrLJ/bSL6jWMOwgtmnukFz8JcUEySK1Gqv

Vu8fLVFHG4y1BoMDsrgiiTz62lajH6aRHqUhVlO2JAMowdabKOBllJIXh6Datbb0kAMXhu2ZL/IA7b0h

uq4DHLjUiSxEmN2ICbAd4Xye6MPpvB3PkDlJBfBrx8
k2BHttjeZShl6813ndslVIfnwEjj7GtFtdEKeh6yRn4QKVqhuuux/Q64kjl+0xLW69u3HUwblyb0VKNn

+KzwzLV78PSxQG+RM1ukM6cD5pgxqihGOQ3ifEdFx3mNSs4mizypPJ3JQ5sfOSHomd6K5rX1sVa+Yg4f

4NjdjPReQ0lCffW2SvgT+na1CzrDNB/vXzfiPn8mKG
eEUlgNHhrYclgxbaBmU4VuAyuV+WI4196nglePw567wa43N6uwWziJ62lJMtZAdTJyWb7Txo36GY8ulw

u/P8m2W6rFVLftmob+FGOp02nB6i6R90zRImO3FwJ+TwvYCS97SlPpzooC7zZl+91A7jqiIsUdA/zStI

GqEf9JbOashW65vBpm4obzcMJTbBGCcWjowJj+GCJv
TvkgyN1pdSYFk2cxv11V42+lgsf4wQUGhZJxrceuPvmmEsBD18EyzT5/efkeVc9wYsx7MxtCdxsZCPB0

dxlWS1eNpVW2Dzuoz92tYIXF7K917DB83he+6orE6RGooS5wqoekHlx6Sp6VL0hvXuOTFKpEVorXz0Bc

pS52Z0T8ci7EjsIjP/jvrSQwJZzAwoIef7b9gA9ET1
wYpfdAOZKT+EaB/hXUpfzvTdO05n45DWdFYkgEu0c+vIvLwTG+Gzc6Lcc2UbQqCi7MfgL/tfzhXPOUkK

TzkCcLDA3kidkH59UETXaKY3PMkhYKwDb3wv4WUF3qw1jnL1yqYy53NVuc8jbcUrBGIr1OqINbmXum38

XBT1llZIY0q72Wsb5ASS1rDCU/Bmd6JuMvH5kUsQhr
oohpSAgWsQvNjyTa7zpLNyCAaW/yXQu40qaE1ANr5GiQKTGzSDWHIop+Jv8fAKGM9apUxPC0/q8FOCqt

HyT2qgBqGtyWpuFgvZwOcCBVNMdhzSH2e0SuPeCQIqFK6j4vpFMYAWCrjvtzu3HeXVh/qrSZl+OUIjeu

CLEvaTtyiKoqKiAqd9W1RZNwIBEYs5qbmZP+b1B1KE
9s+pfJvMpZWDc80G2+puObnz/AN+HBOzbkZAKqwBpZ2IUvOwPuNrcEhnbiUl3oZA4xq4/JdSbPMLjABv

QZLnZyZDVchEg4jXrp8oWL3D2/GjH/6D/yKbI8DjRNBd1LQ3o/d1ut08QCpUCv9SlVfntKMsk5HxyWqu

QUH9aXci+rVzcIB9nj2BAD5CEo1EN2VqXLNrJIAJNY
4FoQz7QYVEQY3aSNOEThIU8CmCGQ26S0yoQBtx84COrUfwbGcg3WPcb9LuL7Lz3zez8mrsetazc1qLWB

NVU0D+NO9rYsSiSV7R3QWPcLeGrpLY8dHztfUbmT56HydfIYy9sRyLG9topSlLirVq7RaYxS5hnazEN1

52UMB+sDo5OSoYMgh6+7Aw+C6/9KDBK9hdc/NlR2LD
lM5JK5cu2poe27x2sRFbIJX0RENNGOCgRma5c17TYu6zU0Tl+/T4ToFemD0r1jctoVQuQTpq10PLweUq

wF3Fw7tH1jrYPEnXVZFkWHagx4G7J4t8fDB9/Z4GPjcRGcThnc5pfBCqx/x2Nxj1PJWyBNWZeb0x1S2d

01kK0p0MFBS4KPQXuJi9q1ba/wPogRxezkd3kdd/vN
sLj9+Yk3M7XXfXWoQsuHSthzzbNL0qMki3rpmAkxgjd4N+Wypmvdgn187j3KHpryfsMSJ284mftoS6aG

GlsfHBMSqQ7WJj9an+BbF7Z9QH/qmMTypHYmPvF4A2Td+IguGqL08Usp/yCHY1hAY3YN2drBdmyIFUfh

ibhiI0zeD9KvHhxxIFuZVVLIM0jkP7+7IEgQWiviRP
Uac2439uEmtDyc/cAwqV3uznOBf8lMMLzlsete8LtH0ES1+EfdZfAaDciAhiCjfXFUIqjq0EGRY8irif

9kQgVUFCZWFLgyyi6shcNM7heXDYnvr4DKoYFSTmFc2qF5ZYkQxjP4c6iGlsbDqgCPVnFNmdghfokdYr

JZr0uqu0x4jJz/jKPgjZGaLwpRoyYN+7lGwKc/46du
ixSXVXt1lCei2eGZJqPkpNdlQZX7UT+8ndUaCgpy11kLpg3pR2PTT70CkmM3PwRJCUlJct24eI0D4+AK

JOAQUIN/HNYe8BuJ3tslbCL5NIETW2orpBmppNbeOx8tJaDqU2n5UN7mslcGGkwHuHehCF6YQY0I/+lGzW+7

F/+319Sb3npdnn2ZRjaP+noUhICd51B0IF2NrhASx7
3wCxWJjMA4sLML4VaxGK2QMLVNxyzuPlxMr7KCgOVizGlCCnlwQoX3hUyHu1s9Hf8ArTZkldjv36AD3q

TO4MfhYjlrVbLtPqD10JpfTajg8LJaJaX7QG8Mij0X4IUHQA7IdxZx7v2etgMW/Plp3FzLeAEQ/R0diX

Ibom7uWd47KLgJ2gZY3mrfssGH0UBJ0uNIKdKmvpWT
N74oMQh1AH/T7yjbfqRnMNxki25JEOpu8SpEv0Veb3MYLnUo77CFMcnYyrNcsLtKuqclB5VxQ+Id0Txd

dwCNvfv33H/Jmoo9dyW24RDLB12r5c9Tv7IZA/LOnukILr3aysTXz1wH84DAdRoA+C+qqT1lVzfj4HPW

5B6YkauQxwoSQZ7tUkewY3ndI8Z8JKlstg8px8mpMK
ZoJhz/Kp3FW+I3ZxkvW4SWTRugp4MY2DZS6z736IZj0ktLZgxa3kuyL9S5vOt3E531dn4SDe7AqkP++x

l+F6M7FQUlFIQ6JO2ctrkgyE/sTqlHu4uxmdZKFdk3EFGKTuRKfScnXaEUR2SW62+Drzx/REpYQ0wIve

UkYeivUgGhwFG4s6P3vLgcdFTh+RWQWrcuzYPmjcmf
7OOLdmvLvJBodZNI7M+nNDimNlblrAzY1J5t0Qx38QHmIor8RcT+GyEXlX7MIEzcif8W9nzw3llfByT9

qS8Xy3xHgrVIt3OCDGHYcjaAbdH6LilrAj3w4fqnDxNKD1YKaEER45EPp0eWB18plIs8oHG2j2F1Ctin

G9E21H2qxeNy4w7yHF42NAuoRsKhI35iQNGtYm6FrN
Mp61Ik36qzCwZvhW3P0KKt82d1kgPdQZ9qpJ1Q1AyQfmDgQ0OcQOmNvlhZ2hmPLa1CzizY2TboUfiYi6

nLY1YlUlug7qsra9tmtTmaorofW4hBsIKCFAp5GjXxBEXUnDMCM/jOCthqE6q1wLnJR6uSgMqZS40RTs

Gf0uKzoNErY5gVFwjidnwSba9Fxs8G6IXBa/v4Dwfs
RNSiwi46ZiWuFbLuKOgi14rkLqODjszLrLkTOCzqs6hbVhdWy3WNPdYkh7oGSpKoTFlByYtDTC+6zb3w

AkAzDDPBIQCiq3bwly9jQ+C+QHwdeVkJ1QcQtwk+kFPZJB1NPdiIGd7025rC4tkF8+0FrUY6Igj4Ko5k

95hFejc2VU9GnibqiXHOjozvH4skWkYMBN8IJkTB9C
VlQBxqliWAYsOTEMURLa/azcT2VpPFfOQRT+8nR8sNkplrW5D6ABKaxdFlzCp8nCWe4ahi+6sSD/gvQN

Az9SUZ31pcQbKoaGh9UHFXjf8WlxenrEsfkX364G8EF06ma5lDCBm3yGsIa/NcO56lBqYlOROY5o5PMm

XybeLHznSJiQdHShhKL8EPEBDF3E99QsOPi59/zOqD
icEa6iBtm3uYpd6vidKvJ6xMBGM712yQNPUE18RENfjkiLL9YTvRIdFhy/JGLweC8+LrduwpO5LWt3Qe

T055W8RVqEQ2WuueEcHNQbdOFOsIJbnKyMWDrf1zhD8K9fZy6NbLSdRomMCyQz2nYQ0ZnrvzIcdlaqUs

MG47xh3BUkD1BE2SQyrIbBfO56hZZacpZRTyO43gA6
iEBO60uTLIDlOHul6UtwqwSclsSmE7+NNuqBe3fJctN4ZfwEx0Ojl9urTNPVbkCpb0wM+GazombYgonn

FvFYHgVPLnIgG8TrsE7cjfouuRLGaruJPcn8A75QNgmBR8STMzffseMSsGgKDDc1tCFNKCbUVbe2kkhl

SMxHLl1icG3tlfJwlUzhIRanr35XOZPS4tM9+hMA4w
sJGDWIid0OrYbikcFxPPsG032yzYYVbC1RyiG1RpyTgN5eg6UGLaxxihvLaTtiEzJCl8l94akSqJUqs5

a7m3cn7boIiAHITTT2mdLkVqrhnVCNiFlTKC/1tGFPNYM93WkDzPRQljohe+laVY7Ve5+Q1HhRc6S9OZ

xcqQHdSM0y15JxoGGvymJBDszdw040RO3uDT25m8z7
aK8JmxojO+XegFiBEtyV0lGnPtpGCuZKNEXM4/IClekV5NKXlKVMyVl7IZ4zygf7Y9bn+Enn4+RqTt3b

5+qHkUtfXl4lu8eQDzIi35jB3FZpO54tCSk13nAd9d1TwSUgcvWW5Q3CFm2ryZgcjp5uSy4wVGV91S+Y

gSJ1MUk6EYtty48D+n6BJapCR/tnEpt7TmcGwdhhjt
8gVG68MxRLaqnRM11ksx33dVMP6OJ/XOB3/Vs9n4F5PE594zXtgSCFsc1n80975dlN4djzt45jeJ76tP

ccgjCX3JPC5UShtXRJe5/kXcdWRlc5OGsXyG8iiGC0+pNVBjlCNOSG8xwQau0/VAb+w/3D/cP9w/3D/c

P9w/2LuMv/zBHR/ohYy3qN3vujXUe9LjEVUDukScYL
8x/2EDPxXYFknCQubl9JIucPromTF8k/1lg9aXwtcclx+Mk4+jHMQfTnnrgw5x+F7kwkyQMTzwtDtSz6

7dqpPOAjiud0Ju1HzXPx/T8e7FCRMTmj53lNVvGi9AHZcZbRf2I6dXPp/S2yp1drstIqOhnQrhwew/w3

/rv46QBc9gH2gvtF50qd1B0JjcUvwSRixW6XEEfhuV
pS5nbpl33QQQomp03zy7vLSv/VC2YS3+n/dqyhdRT/GW+7d6L2+Q+uOiOcbslFxxJJGV8x+s4ijDmdVx

ZSP69EuhfcrD3mjkj66Bjwe/N4ZfM0KQXZhk2BEDw33rNsxW5paAoY/OlDHgACYep0R4cNqHTPehZrtn

2bqTOt+z6RH2qXMznXEpbcZasfySa+kAFd80dxyqsB
c0jCOojU4NIUL7huq1MK4jFYU2sxqfliau1SlYl/bhV43D0lCSnjDxdXOclHLw1hVFIDRaEmPN9EMdCS

oeEJ/eRxqe+lF5O9wYtMyqn6/XfvBnL3OJmM/WMc72+QNx1wdEEbi49uBwt4808YjuMcNmDxPAiI9ha/

2zlSW+4xKHLoqNQG52tJRiZSZMejZh4YLbiSYaa5G0
SJu8IbXWXCKBbTMoXtcDLaO19UEQZOGaLPjvEu2btm7Jz+pg+lxZt/Oat8yX0AodLWf0qBbU/9CnNPeI

fnVe8T4N4zlqf7mE+MCRroJOiVnjPK/F5TvbMbpNkL9yfyJxsIF9PGVBY55V4ywLidfIX6uDJ+KcOTqM

a6ugZRyKxrKbvvtHUrz5GG8lO8Oj41Bt+SJj0iMZA0
NxFyT+VNBoYEdDZPUdDD0zIW45P4Aa8xXhObXA/pM0cPBLuRPPXmeNMJsbfxmiHPWgLe+kYGEZQh7Sn8

7Tthp3QobbvwHNaOkbEJaVTHpb32OcXaV4Gl+Zg6g0b3RPxhJx0/+RWfOFd2XpXXe7mN2553AMoN1xln

NAe0u2UGKzcZvpyZL/w7Qp0puD1618uO0UBnOhk5oG
oVeV4yZsp+dvWeCbPRpzOGI9zmF9ldzYIKxQ7bodcRVyXkPFRjG3dRirLuIjq77ho73bBB3PUBgC/0DK

WKwid2mAeC1Aq7qYII11oEgRAhdTs9K7onhcG34iFQh9BUnuWx4t4+wqSbJMUo6njzT+pb7APVhtylQa

OJTuJdp0EKG+oZbgV7RnMewrU8Rz6O53a9OsJo74VN
akUjd83HhB9xf5e3CcFs1VTcXpqOlpbDqZn4h/bhEEn2Jv6GipFL3Ovp/X2DU9uV8qG+ClPvrvzkJrvm

W/F2DR3CVTGK6kS0OUm8tQwmrmJKq847Ww83e9gONNtINXqOd0hpyv1nQ13ToqggXEuYd8z7t/ikwsRQ

qxiR2JybaDFu7xFAqCHBvQmCiBqUDd5wDilAdYgBL4
RmbV6bywFjOVT7/GpribtwIZJyhvrt2MCN3/mLHhmB/GXaQ8n5owxtGLuC4bxM2SejxRaSTDos0dHGOQ

xkaNPyILGTwSidVriTMzLPIcDjqhbsKkCnQD15IWV6WAnhU/ihOChzMesx1kfAl9jj82DI1NPcONDssq

Hg3TwXulkWVFGhr36hU6jxzh8+awOjvX2WMg3XpS4a
txTG6wp+htL55sIN8G0lMQpWhkd70beFxsFyfeTreY4YOE+XJICdP4aEu2PTj8Bqo/KWuTU2z5Ugh/5B

a8+Q6agxLIxPhs4sS+UNqz9qzykd+Ioim++8tQlBwocXro/9SwU2WVWB+r+0RXrh5k0Xzs34nB5SK1C+

Yz/0PPQLB1PDHzlT5w+R3k3e5aYp68R1k6+Ap9mQje
vUWP/xIS7SSfMTJn01np79S9Ksg5cqWTER59DlrjI5N0IXUCeHV69Ke9aN2NPjZd0i6llaANC6uq8rHC

3xJM0I2pqW5F++RIR3IjKVhgH923nJTyyyPqbtDl3dKxrei6KWPlL5nQJ/9THVqadtzTG3wxRm+mD/nS

oH70s+8KHUi6UBaAtS7mLCHv9MS+L47htgUiCrauan
H8hLP5o+SesKZa5mb24tx1ghhHZWb01KtO/W7yh4WZ1q4Vm6jvUCzyrhlH2RVmgfeDNzJqumw2wCKEe7

Y20e3m5RlDA0mMk4KmsbbsVzhIqixqiVXhjB1x4KvbTFvq9wafTMNgdklF0lmG7UJAl38G3ZbNMref4W

Ksyob1qJcyi/MKWDz1Pq7wKKaSgVW7Gid1zAXtVf32
wX7mV2pvJxMZNmB/44d1v8ijz0rVWcyCJn5xakeSk04FeXPero49VWsoM+L1iONhoP6XPiC8/63QISbr

ofD52tBwiPf2b/kjV+kollifIloqnarYoyfBeCAjQrMrwwKgYJ4fquKIjcixkpJt+nzGMe19dQn8as//

G0V8pxrNTFHdOxvAiWwuQJIqpkowN6L5WRBcxnOpO8
0gbtwRbzUrDCriuCT9WCnqA/YYoCM4P8euu9yahh7kKXKKQCkMmdGRrlVS7qqMISO6gfmKCkinLoqS81

XRCVe63XeBOhKP/Iu9An9pIFpJwGI8qtUMMUJr6wzFRx3sAb8gMDtBvRhNtPooKv/g0x+nj6ZKdyikoE

nJnUaTA+fvNFCAnHsc+Maryse/HiT/tSJ+BSH7p711WbO
lqloUk5Gq9Lq9BqEhFZ40lPey/xlse5zXPpLMmBeQ2cOwt70tu/vQaX6ghPf5aySNh2Qfq1+g1niqVb9

nnitND5XlFjFal9565VmzkI8BnxtdQfeBqAl9G/caINfSfi1l02wb5tnNaxm4SkrfY9PcFBAlO1oeeuu

Mio6FhbPOj2MrW8Nx0QfRRhUPp0LHn1fyLdqyrp65c
Z47+SYYqc9zvxhto+zd69pKTPR1hPWkB33KqyVtinGGzEuHdiniY8glUTIAO9JfHGDbY5fTfaXD5wp3T

o+Dcbo5NQW1nnwt9VDgL+eU1ySICMrG7ZspZ/qKz4lmhd7Xjzocz+czgu137ulcny08aQBylkxPTFhzK

wBUvTKWNGbVbkAJabHQlL00QUlq2zTYw9qS3UQZ9y5
HzPSkAXWROMaw91ns6RN6MucLRkzFNQ7rHl+BwvZ+trNPIlLQlcwwoic0fU93SY6FwLokiIfFz10ODuh

ytyCKC0t3PM3/7lKQ1sVnm1RIjr8nNgEJRrYtzBCQB3rgo3QIEJOxBDW6yqsug1x7asefpaGZKeLF1kd

CKHCjVDUrKdIDPvL8BZOGL7G9HiAi5Jg7s50zTmuSP
CMEes23nHmjrh1i1tx0T7O83iBiwJEMfxdDTVO24W2RA40ZsbdmzPocmuHxOCz5T0BmCN+KZIc3jYQJi

wEE9MCd8epR6C8wQp5wxbNFRCgEGZpnth8a/X63Yq0Q8JFeY2cW53FfLp2yvhhPGstxkF4P5Ya1H54n0

OmdCVQ9WG1Bbj285bwbKNyPjbV8FRInJguowh0N4/2
YrjxtxUd5xnmj1ghhZSr5S1vgxg2bYA8mkdSAIpWostmlNPeKrg1zpZAk4aeH7YmgLvO/HeOWl8o4aRm

kd+6SMcdVi1v86sY4K8ZawQ0ftAxCFwgJRrsEP5y7aimkMkP8aEdQJUvMulZSvUBNpeZlZ3AsHOuUsv5

7cbNzXvadwJAasJ4PW98q5gdvGDuSdQmWBgwGi2Ce3
MZ7VqA7gz0WtcZUED09Ptzl1/5xlqWaCm7scR820tY85IYnDzv0SlkxXknx+VEwlKLMIo9cP9gaRlAJA

o5G0FAw2IGtnsxuaafcMMad8xgmtNR5kxxywgKuTKr4shBsrWV4s/ob61KUw2vwNFda+iaUcGg4qzo/P

Xg05+KjuETC4mI+/XNC6CR7dQbfu93UtTv7A58Ki3y
J/oR1VrEcq5d0VvLww7qhxYdxoIANCZq+SmT+bMo3ZFo0rF7p+eaWdsquetkpcX5pymP0ADj1Un4Zxur

JQ7CVPSOkRane+eNg1rNGAnPtKK/aWckLdIk3LMxE7wvgSA4tgLT1JB83RjrS+4iuAiGxp0AFjDzsYzD

A3NOTkr47OBzexN9Bf61VCcS70oIeW2ln/uZkO6W/Y
H4jPw60SsgPF+R/4rTax5Jc7KEUN12CP2QpVX1lH9qh8HHz4zVdnW2M6CpktSSPf6hbaHNAWiNlrsF0/

teroIi3sGmiOpJqyDpkyzSftaZekBXGPwCywkuq/RkyarF+ed9R8F6JFGHbEf4pupt76+OyDy3sRRFnA

/LfI6cPPxoVAwxyZBfAoEXrS/jaU0fHk9pgvBtCRwy
jX7cN9tgRpc6Cl4lcGNVQ2IHywKs6fCQZQVJP26uFwY0g6n59NjjAUsWlH7QWitTiPpE2SULMLuZh5le

kY70u7H6rYgIZCG7RutfJm9Cl1zaEVj1Xauw/UZ06bRU0zIpYMAWoZjLNgvG7LRO7xjgoTpGxkB5b4WJ

oYDovaewnrUnr2meob2z4kDl7KvAP5qmN0eN2KW/Xf
SfkO/s8r1qU2Xnh1yG6m/ChGFdEWhLgLn+wgWEug+e3fcvRduU6akLaiGhAIE4UDi7QMdUQ064si49Ow

2EMauyPhMY2M63GJqGE9TMs8ZzngeOVqWjzR9XdDCaSqBIq0kGjv32EFr6UZK3G5XGyvy+OwAQhX0Yn9

vE7R9F39ltuyJe/L6KEIeNqrRs4TBatKFXXNVN3kUI
PtpDfVjr/ilkw4L60VQjqkezRbuAxg0r8YmXEN6KpozZIzU2NAXHuxXiccF6GqHa6g5CSBUmf+vCb0pt

U6+cpCh/jsG2d8IAEgPJAAorJZk6HqSgf4yxDF0CzKQspDPIN8YZir1zg39pwMdMHzNf8KZxwVmofRq6

pcC9N+izpgVumPgurpQwGOC+X5MD481SJZdtCW4M1f
51eb1hqUuk0taMTsPhQ2LKvIWwfbO+bqMpSen1pjVvM250Q5kQ2Bneuc/c8v8l5X/irA1VPNZjEGS04i

1NxHL9H/65KqrbunE/vCdW7Xywl3Z5Vg6t+p83NxBsCnXFns5ClhWLcHp8xZtKMBTcOgMzBqYmRONlGE

eqafkq4/yieIoKpNMC+l6kw67dk0EGx7HofxQWdvMr
BGhaMR1CB2hF4zlF2viCsDZlsdYiq4uZItWv8Jcyf+iUhaq5HkROaRhiPdXmciHwEymOcndt/0OWr94T

vLSQWhrpQzLG4MYAa8/DM6Tvqb4H1BvsdeSGnw9cDk+7lGYD3zq9qhCvp4tmYNx9wBSNmnJviKr4QVl7

b5gb965yk965qp+TQhHctZe1KOeEw/ehtA842ayQar
Pv8kqiNh0xhMu+VtrGCEmpgL8pslLNbGyA/hQikuwTCfSRWWYxUvg0wt9F8yTGE89QjAm1VbP/AuOTX6

pZpFoI4JdgDNfUW7IJtrr0JMxTkDuRyLZKghJRMsQCksNDKpbXno1OPG5u26yQ1WuKmw1aP6GQNqM97D

SOMQSrjt2JvXwLKbCSaXJ6EngzuXMQe7EMhEzGA4Ly
QFB5Me+t0kzvLww8cwIdWfnruEJTChX5XDj+rUE2r7QyApw3gqbSCh8H7F0t1ZbGbCgbRIDfdwtf765B

sRs1Q58bg1uN64no07Q3Qz7s2ax/8oSIZ4cp1lf/9L4XW+Q0w73H5Z1RCw08rZC1C78bKr14WczO88Y6

+JQdk11T1cLbQXhdbcJProtTY78eh7i9PYFFCffhuo
gxlE0m0kXvA2M8dJiLAfWaeW7bq4go/oPlE/LOzmYyNA1daH/T9EPJFZx0Wj2t19n+z/lpJPKtxvASJ/

A6XouX3xoso88+1laAKXYMSzln8+cH1rf7aDv4d0B0jsty2f2oSHmcdU0Yb2HurKdZnW2uBAjA2CH96h

dQM/ra9+7gpRTZhsomO8Ukyoep5qMZDo18LcCpl8Bo
DWG/yZ0elCdRQOPASD4itzp6Jr41s54Hc6P7LYoswTFVvqZUf5gbolQgt3C7nDdabs8V76RhFpILYyv8

hXYeHb7HweZLGKd2WEJvhOiYnzAwluiwSXdhqvu8YY+1XgyeJcQpwyMh+PN2MIhysL8zLZE6T64KoIvx

bkgmd8aA8tNOKF80AE8eanuN40r4gpRaR91wd9E09x
p4L8QVfbF07zL3Iki7OJ8ezQhGxacX60NmJY7tEvDOKY4PYqma96tv4hHgTYnxx+qmj+QNeUzkBYy3Bm

z2vU0Tq7acpt7tscbnztkKDJtf62NmhIC7rRXruENK9A4joHmKgHz75yeM10ByWCVswax5cSEgJdI3Nq

5AfGJSrsMbtlGlFoGU1f0ghqTWfg8FSWpOtTQyXCaJ
BUKrfaAKrMTLZxryDn7sRhJrYSjaIHSt1Xka1AflhAjx4Fs3cC1KHu4LzBmobbHrSg3vgIMtr1/dEDsa

N6Cn72ZV/pQq4lLAnabLKheyk8qRQHcynYd0B9sYfkum2r9mx1l8hP4RzTwWvZYClmAkszmCyHG/+Freida

55OvT4rGRhx91Fhv/XXU1Tcxa/8WFP/UMV9xf1Sk8U
moDq0m1/kgI4Xr1Uqt3vL99fH8iP2C6BNtCV/r8D3ZtO/U6lw6WhdsZc7FmZMqmN4G9767AEFuehmngY

ta8k4zEwZhLbzbbn0tcBq1bqa2allIk0irhPOXAdSPdpRsddXTX1RMZ9xzRgbmzgmAFx4CML15Jxr6P/

N/aG344i0GSz0I0G/oxbOBKeBpVp3xWQoQIXLxGzNN
1EwkRkaDi3lVZNFVcLP+ok3O9jMjrIlPdQSz008ll1iXltU6h1RPcqeEqlXARZQch1ozaW/9JYOJ3pv8

V0ikca15pd+J604q696vkI4tSBvVr8dc7QvwLkQd0lqnKhC2KsdAyvlP0zDAOjIztNjyq8BW4e+5iEgm

9lUfLaHJJwSG1ISa1KmUKidDgj3DvbxNbOeMeuOQZ1
31J+AfZPEbJ2gwzstaSjBkIADrqsTcYclus99U8Zn1jo0leEm+vSmC10qW5AlQ87DsDm20r2Z6TYE1jH

0lAHxpfqak2rexkN4DYAgTd8jeZoUIjL0f3YONs3YzkjgYoNSCP0yAQX0ogv8zVUiuZAit+z9vZpxTEc

yXQiQCoXiJt0a9m2jXOlKGZ+7qu+0b+HvXW9m9Pj/K
QxmrxhWkW8LxMd2n+yFL9g6ByZuaCrJxtKxojrFGYV3U1TXUTum4qMRP6vRH/ARpazpZRiUcjgZV5Jhy

+3JIxxYgZkseAAWHmDSGAu4HhwbpvlFyJ5tM/fWFwopBOCSFKoowy2oV4gWgB72GiwxVwQEjbEn0Da93

CM3zFxsfDGMcLBkiotDsOQ48orRM0czXWBq98m9tqk
nIgtu95i4msxlvnhu04rrptbouIVGTKQjMmXbijgL0bXUUIdZyNB1fQNiCAr01wRFobJsfo2r6XZLSS8

70cScganeuzuS2uLgKVTxtG/Pyhp3qrFvMydmMYxsrtH+JfCM+wjqyqNv4iZfP1we89iw3IIMdP1d5K2

c7rDnxaalRjheajCKqvwv1UtN6JEahr0fwm6famcCC
T6805Zdlt9J6F7ImipuR2NTeuKzTSEhZSo+MaW9+bYwW/UTvQQWrBqqKAKGjEI+t6op3CtDjLO91mw04

T/FdzdcrvOJFHtUWQxSbCh41KOEmiR/Mxa3DicAseb3/MXv5XmUmIpMhmHuq5svJJRzQty4XyEcnAERW

KHOCepG7fbLtBB2QqTSqJg0IlfkXz099oxKj3MwkOw
sC4YyWjzAXOn4ESV3uq2XGIWFEsDQM3i15SDF2/5HzhA62e4FrhDkugOr/mo49H60N745dAIt5VkT1X9

9hFZuED81CRnVG1roQ3z1Gvqpbz/m7xIkNmD8aBiqRfc+o+f//sn42Cd2PwpCphx9MxCLxcqhrVzrRUR

vxbPtkSeZhGK81TiWs6BsjDpwYgYOKkrevk5BZ7Z4b
t4Rbs16K7EG+dLIzw5GP8fc2LaG+gAbXxM/hHj1ekdZzwuE1gxwYXRMjUUvujZLYRp2dtWKz1vVDnou7

Tx+FcV3n6tfapz2aZ5ZfVjVPyM4iyMH6R0uuC2TEcHaVveCJfmCwsL9S+tHiJKdUaZLrG/yZcvPCBfGm

hRQu2nKho/j3K+fEsUAkUhMjmandlnNMRKPUTV4j6O
YQDL/zBO/IP/AxDAGqrUjLDWlWCMYyDjjBc/y/HQkMQdFDzXnkxOsQFN0iZqaJFbI7afjnG5nUW3kOH/

qQLbjToliUwcKyYEz1ywfjviM+xa92lv4lwCMbFhk9o6lemCE8PI29OdcOZgjsqr2XfpjasyFP+kxI1E

x+CCCBX5vSbp4g9OaAbz4rVzaHqFk7X5pTv3f8LnSq
IOPNuI3nAExixi4/u66d6EWGhdIMtkE6Ksy1ycGQUOLWpOChrtXXdf1PDOZH8DOgJ091MR72aMjEUNc0

CEiH5gZdsJz1O/bMiWeNRkAo7SsBVQv65D8Xl4H3UIwwHilMKE3bI3pRZXO/S3vW0dRbASZ1SidZY4le

A+YCWZpHvlT/1EcWmj6scQlfH5NvCu8elJYrRmV9UC
9I7ASeI4MPr04DgaKKeQFGkkRP3yoZJdZH7rrlMlrH5rghogf/WyAKT0KVi9f/QA+a1GbScEWg8jOnI2

+jRqLmh/6EbZBpg3+7Ac0AjbQipf4715XYKw1ZH/P9rO9W9nuTOEf7dsYdZUzwT1vXawONhdAkP70EJP

0vdTrTEcoCjftFnekhvjJie5lWUYDAdOPUmreuAP3Q
7QudAyJ9JMW+9I4QJASqtqux+mdr6RuOd/o3BNG2ZWEWamYDuK/cqctjqT+M2fgJoumnK35Cv79uj5k1

7aaI6f2fJoUrcaema0I9My7jP2tQwuFxZAo6UdMfq3v3KdapqkDndbR7UVM19wMChvUy4icKnSd71Wco

huBgy/7xpM6x5XStxmFidmit8rOCdvjjoNqGmh+t4q
Dk1HAMaG2QOfIxbcJv1ErOp5mhVJciSTKIvqlJ4KTq8S7Cb5JTNwfpPPBqaW7jQUwfjdjM6XvzX96W5d

THvuyXCk/Rm2AQExdp68KIIAhESqWi32XtKIpRMby1olxztc7KxUQEqRHgGbLX70wGqMoNy+oRKFJFVQ

zFXDnzNwz1rK2WVjne9bhLyvx+4he0vOn2hvn5EnE9
VFNPGaoTJCeXR6usaKAAUB8xbhEwjBPBtCC2G9T9OHDzg7URsEXJguLxSa4hdOPlWWLfnm2jwzd+Iz8A

V34VKM/N6VquIoq2FfRdyKCC7jbcHpIOKmsHipL/pI2Y3iP5QpvE9165krJmdmsNZ1MvyZg9HDrGm6Ob

4L+8y1+uv43c1lu9N+1d0T4o2LAgmsfE9p7REC5+cb
WiOryQDb8hj9tKzuXe1UDEbiIzlC/EKkGNNmYS8bsCf9XEwxAhB3WSPfFHTDYORPmq5RjQrx0+AkhJZ4

4KP4zPhZM1RDyZcZTGtxT9kd1/Wgur/SxgZiZR/ddOS0bvSYM8seI6wJiW1k6DgSACcTuFBp9dxiXGwe

PYh/6dGXJVrvloTwkOiizcmg1WWCozm0TG+3S09JAm
aNyiD/PeA7sLnAip+cowkwnhSikurDQ0S+29yVLWoMddQY+Gd58OqlNre9292wcEUdWFOTaFDUe4ijJT

8m9CjiGYsq3f/lRfG2KLq0AFR3dQFxL8L/0v+qR0JOQBi7n3YUQFznZmpoy8ZBYvXpbu+YU6wQAk/Gp1

gWNNfplBiyu1h9IU5v6zwO//BVhpL90OzPETnRpN75
yF+Tm21eIk8tNqHGZ9Lz8Zy+zJH24h5oFddwIvsUpfUYwFgyiuCrkWJZzsyj4Tf0Mkt3K74ThX7KmCaq

IicNeRu7j8hEEO4mtBJQdlfaoOAqN8p3Ek12Sw4ajeP3trM5On4mnme/ldvmdDtsJlnh3W4iY0/84izH

8ouuZ22E8R87GjCRZ9Brp3UZApxlb62tSvG3zSLusN
K6UwWF18KIx8KjlIuFXUOYSBYUlM6yo/SY9XBVS2mO2ZsB7+5YMSkAaceTSH0xex30yeQHsNa3wMOt46

2rSsveD7CK/LjQ9pVFv2VOgaJLPXF61R+ylcXE8NiL65PuWA/iVU6DOUZbJY9RgCDQUpaq6lb0dQ8H/M

ZMG8PnGfCPS6mIy5lD5nCPOpMrr/M99zHBM1fhFVNR
a4JCIuZD1aoWbgs0t5Ft8qz6Z6JezL71YAlj80josB6bWh/NOVCIeeMa3PocQvY3oqoJKngNOumgW8hJ

IT7jvM90zklkD3N59F4ZiQNn3c7XBVCC5Em/e5kJU0pRz8vN67KE+2Fd664ZHEjPBL/cxHehDb6cyXfM

yxMp9SxAv/x82AbE9lSHDCOZ58xSUg00WCRrklkCQg
8yuFVJ4Kx9JWQDlsFSUkbZLpPvcepnEecQ1jATNFnpyooiES/DaDLD8Ij0xnwjpSh1wKaZn/PnkwKyzT

roQtnakitkQ8GnEU7eohH+s6T98XNUVmU0esCpSlb6ShdCGOM8N7M06ei8vC2j/tGIWhM808xQ8on9eg

vtWm9dP8LnYCTh0tiWYl05pfbLZS+FirbVVnOMzUMH
9B4LBR3/XImcQdRpDtSdXndvvuusRn2BQ2X2wz5Vjdmv0DeTS+qnfXNE8Oy049WARl3rzvldY7X+uIpy

qTyzRmoQyWDCPUdD+O3okQfMqIh2b01ZgbyfkzWhFhQMYUsASME1I0ybStfGC3fdtoLy0RO6pV3WvVS1

IvVJ5ASrFFZcfE8JTComyYygycjLx4T9GeoL8vj8s6
mO3GJpjPhIa+um8M6DNbqAeP1jw0xqEw09oIfGwIUxdRqB/Gw/iK392OpI0bR9IWJr+PjQcy35ig6z/J

oSLV8qHO7dqZVe5gL7gt1OrBi3Ezjo9esz7rkcVUltTQojlx/il3nOAxmgb6voxCJNVDZ3Kc9M3dMs6B

EcC/Xg+/yEcOn+Q5GPJB+bWURzdKLvoInH1X0VWRXz
0PRNv3xneduYP0mY9P844Gr/4nm2csWE5CWN+IKKxu8VXuJ7H0IFceTyQumIss+471VhHlwfK5YrH/NK

TDzsu9Ji4DhRKB3GTF3RswvCqDWQpRG7/xHZPvnXtAitSriAUD3aP3w7xhoSvkg4XQP6hn1hs6+JjVj7

1rN5OuuLeMdC2tdedwUkbcg4JNbLeEA0K8yJbCYo45
n8q6rFM5HhXlXW4QT0xWiVXX3mxdBZQXXoD4w0m9rd2FULh8ryZg+eJV948gf+0nj7pXREQmiPSPPOH2

0Dh9WBK3/4ki6vvzY3KBVG1PzacS5g8wWmTVQp/kbNAxYMhrP+bAVZAYy/iHJmGBguJdupcnmAEjrxLI

KxiC82EX8tJGcIpAWC7ckychSQGJ7sApf2t59ajh+m
AeJEvjdmMFQ8FRVZaCo0CaclAHXbhOKhfdT7WlaHpWLGO2HmSIfKZPbRFF2UpXYhxy1fAUz4MINthBop

DTkn//356Jl0sC+ckdrfEqPGVR79dxAi/ePQ9RRCkUFS9Jc0eMCNASnKXGlLFfg2dOQloZx78IrsrgCD

9cnos2HMtXnGWW+XexahCPzOadGWo6TpBNUHVlrgTv
Hprc5dR7n2AYjFaS0mNNWjXB/p406dmm7x2fLHUki8J/uBf20fH36jqrQmakDjtcCPAJIRb7GWGZmYpZ

JhYICxdnVfO2VMeqQPqvSlCDZfgPPrU5nmbLCtxy882NTomx0op3IFHHsal4y+cyB1eVNXUNWy0PtIwg

qdai0hHeVDlJPa2vrclqF/GUqB/r3dvNyx/vzIVgXp
hrsz7KjqTLQS1Aiwk7IXna4ON/5MgFix3/r2e9c+fUIKSfqRhyypaKnsFtauwEbmn9NJA1ehxx0bOb3y

F1b9uuSq/oL4cq1iztiI1TCRiLw2r9IvP3sh7V+kRVJiGjwXHiCos1DJHraVyalMssI9N6BngGGEiRHT

9nnz8Ugcu+aln+itKLeDFlzRqOetGM4/UZIoEGxdf2
ZLz1B5nyMsPIRRV6t8+iNgfm1Wjw5vMl0h3X+cWQ3R5GYNkEW8hlp1BH3cFW8xeAxAnDF9FHcy4tmGxh

A/SPwBOhs97OcknAhcz4cfhrHm+hQv11Hvah4AW9pHEIsyoqbRRNC87JITzvEVzMuS3lSaWf40Gzi1MF

mmt3myRBkDwdS/aN7gKgl8vte1MDZnLlwMJ3sveKzS
sjapHtuuptHEf/ddwT3ZaWFBGfxoSPQraCbGVMiBwxZcv9tpn3P36uvfrawlhTWs+A0DjcRY/27X/v8J

xvJIVzaoa9/ePXQPdsjkq8XesKFHupI9Ipmwal8QVOzDnA6KIQm/W+yrSigOa3jLmd9O1cHYiMOgDs+7

1bATYxDHjmuM4vQXCiNi5Dr43dWmkP3Nn+wkFTZJOp
JTP8xkeDTuAzwbnuToQtiGZNVXiFN4M9h33c11OaaIXOMLNxezsuDmoDQThRiTqJ/G1/Pio85OcplzUz

Q2xgb0vxKvXQzJQbQ6dbJ910wlE8yozMEEEDGrZ1OdShYEdSkV/x+1ALYz4lFNdkSL3n7qIwSc+y7A7G

4mKO1vJpgfuXOQ8i6mka96lmvPaJQHYp6Wap75guu6
z+IitD2bD9v9VS6i7S8rIm2AFE9rx5/lR6qes2FRY7M8e4G5gSm1fuQ4dncY4QeYAP6HmhJcbcSYgC5P

F/rbfv1MlYDoDxVib8uDrrfYEB9go4Lt/UXChwZ8K2guvP2rzFQ/WPbX4vQ+4n8l0dHp1AKpOfnWS9Sb

yDsm6E397v7LSWOAd7WIMfH6D/F9OOvZomNjqANdpb
hcEB4xFkNX3Di513osHM+pLrqrGhfvRqmpSGXwbCofv+3KQ3yXWPfiPtGCu8hVO2NZ3fdqv43wMun39d

9TPUr+lijliOzusR1Jq3FfQYAQqKiJX2oXjoqsbcS0XB/txI8WIOU4u6s+7I8CUtvb63kRGM0CULATPV

iZ2aqIraDDYF5TKxYpkd0Jok3fkjQmQfo5Hgdo8j5c
rkDluPC/eigE9FtU1y3tK/gBz4bgEsRUzZEa0HOwxpWnkpxqel+mrhs2IxCLlHJd4Crq2DjktRlwQq0o

zicvxuTcMPa0pptCiEJwMzWMtixFxBJ//84t/gGQ0dtEhebnRG7nbGW7wEX5vp9BGZkokkili7n+mwtr

NIUax7ixBamCiQTFSabRXoIHfYRWgcMmkPrIA/5RfT
tioq8CMffika2f9vkmzVE865XWsheTuwS5osXUjbu1gq9YZYl1kqLv8tIP7qf9RkvJsdkE/SDyHakjlp

3Icinq3rrlOfDD+ZsKcF4PMxPvmGUae8M800S/ta72ZGZ0PV+Ho0bYWArDzNfQYEcmNQFHYloIKeKO7x

BtkWuObKbYzBMJO93UoMdYGbQCV0uR3jlsMY5/r767
ylba8Qzo3sre8V1bs39Ugzd6gawoKXvxHg9v+C0BE9QPkQOy1eZImPldU6hv72W+6PhCd7dshuivhprk

K+lBA0ZtFYx0bAsnuoY/19e4WyqLWYcHWDv0o6DVWjg91QJ6DetiKuMosbzXgzeFbB/ZpbCwpBDpOiuc

+NQojmRBnr4t1t4SdQUEcDI8VY5no6KaJ0mmzwUIlB
fqxVlcGLW60ya7kpDlLdgj7cm0husVaPEKavPqRlkHMwmjB6EBSyv60nr2XYbfMgY0ZEbXrYAtsr7Q+a

ikK2OOvtmAVRywSzyQ1655z7/gii33lhwGTiy0j14EAw4fLFqb3ecmSeRchqleXaJdcg6TAeCCG8nt3m

DyKUff18QJsUXRTOKfokts4m0mNtpAj3Mk77jqsfz6
yY/NHNjVRAIEIr57dGunWeAprbrGsA8FrVGL8IhVhKADWj9L6RdMcOBOOTF8CbgEb86VL8GNj8GC0/QV

3nkpjxtkpvOhF59TqUOm1wsZNqDg07W9umX/Lq+Jvg3w8n57Mbb82KlkxwNm+KFT+imlHfkefWDrh66n

WaU9Vykha0p4+FXMY/szD2NIyb/kof7Cpw+RLCUXVx
fM1j8bg2RDlWNtDTgtX7GNAlT/ZeVHyEBUYtAiYnby2RVO4XcEx26aZecthcjzueLTSvzCyqjhu15tlB

5UrtcGfK84of27topH4qGCJJV4wCbu4nlKnwmUp9pG75r9Uvb3RZIQ3ID/UMNqC/DZpIJSRRk+iA0F29

JqZ+Khc7ho/jyLCsUZKYeOsGjkmAt6FCes82jHSOyd
EeJs0SD+qOSplH43erkDU8blAg320N87EfLOY3E3K32E9iME/kl8jr1pww9n4QEPPqZ3m0ShV4j7Twxr

wffblY/qS+CezKmlPI6ApsRQdK2Q5cyHCw+/gjZil+W3iFY9MV2T1fqjdk4dzAk0ocPAdaizjqnpPD6u

P3bOEf/2N5+mwL80cqRnBBIKpQOsXi8ljk/2DH9gxR
dxQBEc9z3J9ZD2tMNZT5sBB4EtH/IcqsXrbiOjG4z3LPKV8+rvHcBY/1GhxK9MIeVAAeeKba2dijeeD3

4gzH3ZcN1Qbz9sGLjtR8T3jQbuj50s7bfj0Zvm/YOB3z+1GN1owzMJjjW/8DOuufGBbN3nRQECDnmFK0

+82VrSfjw7XiRPVhpb8SHorPp8w8yUNmZ3wfs1CY6l
WFOzsa+xOHixSi73SMqdHjaJf01RJRlfqbSEikCpGNnWIVTMvaWL88LM8cIP4qTVirtC9PckWj01Yani

ALCHf59ro0gRG02HRTr95vf41hNuScgfzmDmUrd16HfFyc+IM0nPQszZS2cB8eEQUY55w4onwylVDCb8

/y8fu1EhJ2JQTL0vKxVvEz++8ZXPO4r9rG9V+bJ9Gp
9dPoWR5T+D8U//XNt/w/Uhx+s/zvDiwan1CO/42ov+ePzEM7HTVT9MnwrWR2yQYvODxbEz4YJWlHPMjx

++JKKiL6XE1oyumrAewrGMW0xwWCwztK7jsIvT04yzoR9mIu/lUFLDEzoLyo/yhg52gaNFatAQgp+E8W

JgPgOOQzQY2IoUHGh4E1D+UNxMwza7EiRy29+nkBk9
4ggkBFbbuoj3KWRJSmfa9kMgzX/VQOyo/KqMhaCYDJmdBJ9yFOse9KtNnk0aLX2jAk/81x1gZ/5hzbPH

ZJCzKsOyKzmzKTI1Ki6tn4F9sPAkI7evByoPojbg88DSvr1HpUOW7A4zu4/UIkGNLOuZN0HgCdfgzQxK

l/LV6cG9hdGIl/2AU6cSU/J5CKuFzQW18toN1uQHXT
u4+V0vEQEzurSj7jqH5fIxmNS3yiF3gsZs97xzJslkMJoLkvYBcR9bZYWEn/yUfSeoNskE02gEJqG2Zu

2fvn1EsgkzHoyoTUZMUaG8U6LC0cO5CLyakj4oE9BoOhTT1nhxfivgZETrH9jX6S3x08dByOg5U7k66u

6mKThRiPJ1DLHRlUjdOp5wkU1QsbhB/fbreXRUUIRW
bTi+WS8HdgFNhVPNqy8nVigI9WNc9ViBT1ImjzJevIJzKAohpbs1ZtkXUwGQzpcNxK8gorl2v9fKYImb

hGJxHnU1PTfOU5tmZg6HqVCR57g1L6mY9U2ibbwxggAjtCznuWfvOKz6MdDZeAHFYpwIJmEY0JwVRWmt

JL2Gc18CVNsO1GKQKBpwA3j0HHp6WWU0eNw8ad+lU9
tVlIeR7jdKdyFjE+rb0h3NZnb+oQhY0l5oVoInwvFZ9fUg9Y9jM8pb8Ua/rGYoKy1d0BLN96qyaPNFQG

zaM3N9JcRp0DDAuwYpSjqmRyj5S8GfLtFiPMS/5VMBRs1o/edpa0fpEhM09WIIbH96p9ZfO7hdWoErAS

h4u1Q1y1o2fheT2E2zUDThFHUrij55dvg+/vEG9KD/
6D2wAAkPE5Vb9SAvM4Gq1rXuq7FEKV4RHAPsfT99xIm7AIhgS+yROY4czkDS0r0TCzvq7s2Sw608U1GO

mRN7Uwar5tqAMuWLjL8B2kT91eMkJDSgLcdRGrTCmdv9i0d4oc0DNUQMX38W+3ln42bCMG8EZm2VbAJY

GRo1fquiq47arwYa7E5IOS5AeSo8m4gSN5SRXQdxeS
JuNdhqGVy/XLll1lUB8fzC7z7gSm66S0aalERbvlvbZTEpSBiHE+EI1i1hRMasDOAl28I+ZFoY0+nD3s

V/mvTeC4ri8RX+ykQ/GXtg7i2PC+Mz9UDy5suXHVawV0S+sL3f8INC0iW1iHln+oG6HKj4gdydOvZvKa

ajDWeXeNDXIET1LpgJMt9cWO6guSAHjI0zuDbMAwWr
QbAQiU9PCiygg+L8USRlbhINZTdzoe5Vc2lq+IuFdL+skbLWxO7O2tuXZWuy5RhatG5t5Yc0cBq34Gnk

ex4x3I6WKEQBsnO6p1pDWn9oY0Sf8Goc11a/eLDlnBCIY6glUQsD5xECPKc8CN6dSg9wLalPSmy4esr9

6L5OoNkvnPyLa0F+C4q+T1oO5zQRlL2iWzEAEsdj1N
Ek95g9gzVwTF/TbZ2G4Tum2qQj7FY9yv3AlAKlBGyHe+BuCXnfaflT8bqHoKYJMQ+jjNpZn17W5I/uBx

bI9NQCggOQgpoVgTPvnIXxkYnSMHC496H5D3kxde5KaIUuTWr85DatrjhDgOV3/SmJmlyDEz/jZbAxkA

MJ4MEWGPgAKq7O4MIZA3IidDwUnGA5XugOGaBsfqbl
klnLnGJw4O3qPjFJsGAvxPSuO8qXSOJzLZYjQ24OupuaykaikoaognBq4DNVTI7ylk4vtrVB65rEpYOo

NgGmQ2h9/9HFOMMXbvXFxLZwU0KagNGH/RU1WQEDCFwCi63me9iaw+JjgnYrE8hEGs71ZwaMjzyytQ99

qfbai7y9KBoTv31vLoVB6V9EDsEhFLtfLl7KJqv14w
6PriKddlOT8SQwdvQFapkJOzf1y1XYe/UXMoXM2jA48SIKT7jNnh5DP6uM91zS+dzSWKtuwYznGlrGQo

Ww2gFzyblEjgFDBmv12kCSV+rWB4YBLQ6cU8v3oI0SCwCjwRbMVU9L06GXGNkvl374aShVamUP+xHLJo

wyMZK9//lU5AvHH5FhQ+X5tbMj/uvk7V3qNcY+gdsa
6Owbwq9zFbF5bsHc0ruUDNqJ93WrYyr7odRdtjtFMgldXONEZQSmVc9yPfZwWRWN0BFypGL0FJza/40D

+DUVpm1OanD8zY9ykJGTBq1KaeIayDV6arvYP4OJ0G41DVirzDXN+k5rEkaAqsJYfvHPYR93S+LzLyxt

W/QmfKHXyWD8C1LrUY8GWj7I+aZnPU2Nr0ezJf8xfl
FAERHnVqfUx/P/XCrIp9PQfpeUf6shyNcUu5vHA/nue2MaqGbyrz2ury5hHvc0m3X0Eij8hAay+my6fn

FzN+gJQw1xmNIfmNHYCKfauMSoAYlOvaavBx16p9SZNMkK+dymL9H7Sb9IF2NAJj/vk2XCoJQp52klS3

Fuqr80s3+/NvuwONikdoOqB0PNFggQWPO/3eIbDlij
rP7qLg2NgSgXSYfTQ8MkqV5XnW/sKqHbi5mu/q82pcTwRaSPg8iFcBX0vI08z4lsE897DwJ9NYK3nHd8

a0Ombq6WKj0uJsz2GKuxmnUtjzzRdRA0pX7RCfxpp99/gTVCmU2/Y46aD7q5qkFvs2TQt1Qe5rnncZc5

3cfdLejxqbbao5M75skkDCpbVrQE5bwHmlKBQmSqxF
E/gS3DeEvZmABGQ2uRdP/UUoyZMhGoTq0nEUxx5fCs0yqC58ChS2W+BA6hlRFL3SlKzudkZAHesq1Hz5

0eIIT5YGVGMkImqsJb5gVOV/Xd8p0f7t32jkNo6EOEs2AAq4Q40vbLWagbgL3HNyNSPJ/gbrUZkghYDi

44/QviftnXg/SO5r0pjGGZs1tBp8KAWdDqtnYmFzLI
6Gcj6SQZUnisWVAc4B7NhbJ/xB8qawp1L405ffd+NnEIf/ffRCWbqq+aip5Xaff1w+avgPyIgcn5giOT

kMRRm2CcsYJ7l0Z31uyXsuajjb0BFqkISwAeBdTdmhbbKIiC+fvIHGpr6pekg8ZwsN1V65u3Kd7ivFY4

IXs7HTG2d/AqPjP5WObdJ08LPFa/XQPhAlYL8wG6PY
6c5tx7bmmQaKVihqgHvqIIPqIwyfp6qDPDpFNzkNH8wqy6CNfMNhRaq7P/wEQPStsa754EPE4szPULyQ

rBmbqWL/V016ulKuR3RgeTqhOG1lMmb67+w+Q1jx6jmljFoDBfIMb8yifbZvqsI1IacGaaa5uBU3Y0yY

iVEzhcCnW2kLYmByYqmtGOPp9n6KormheO5xbgmTdT
ZeMJfDZvyzXKa/KV6dGeBczZE5KevaR/EqhGas0xzEvfg03n9IlY7G4U+p8dkQHsakcCwpxLI+X5naFW

FtFSiKAVOa/XIVIDlyUDgtZxbR2hXTVnLCceJGaDtKGtwVZZlu9wDEpw22T4KAsXt/z3MxRKFtII9yep

tCH6QAcFCj0Y4uU0dowpF2hV/mK1OiwSqyCRvLPyY9
2j4JukTui0ulzOzreVxU+CIiEPAHhU898BoHAD++h21PdwFq+rA8auzP+ZnXDYpPWCv4cjCsAnk7IJzQ

RujjoJrM9byUuFrWhlaA/k8tIaF4ui/P+3EeH/Y6L0lGGiIGnL1R7pWI6pk21J+SpZ2IWttpk0tyPzYa

ndxwfzarCtmjXAZ4+PUkDRVgotyv9VPMzspJjlmsgE
2Qqs06tzglzYyR1ybQEtf/95Rvc/9v+bZqPi4dcoB0DvT7eYmoB5RjjHu/vHWLyu7+jkEVWpam0dVMgt

5xip8XxqcjpBQ+YpBdVODFyiy/ZFxKO8KbKlehEv4Fy8DFV7TzPEvm8pplQ9ibkJf0owG/BuaPJV3dg0

QKA+V3qz4CI4X6/SjvgThtnn6vjaC7ELHtndvFgB6v
L8EeLG7y56IxaucqZAyi+LNX55YSuU3kEBjWFgWOJhtoqM6XNso50B8wtLTqx9h9ewSLC4P9GkNUptx9

NGBDiwwaIN+Rv43h6y62KLccmRZ2RA7e/v4KdjNYeOSMCqe/d5G8lzM3wbayN2+jWIp3ENUvVTiqjCxC

ZwIynxpv9cScCqbqPmIydb+ZpAdoqL4nfPEmKgZC0J
oJzGOlfBapjEdIuRVfUC+dY6M6bgTBTFxOYqrb7XKtmpyuhDmBeM5bpEzqr68RUt8DdFhSDrfnkTuQ9s

5o7eBrXbTJ2BHOjFi4QuQN4zzjsdwQi4CG79tHdzLd601PBmiPdCuQuSZEW9odw0IMGnVmEIj9CA0p2x

03IrJ2UJNRbxq89pqYOl9XL5emMPHNKJA5YLjtwh1A
IJfTX+1ALJzmZpAVALChUncE7jeDCb6SKeyl53rm/hCD/0c8SGLnrrc2tgB77RLqqS//m4kSk5yQw04s

R1r1v9zjiuNDPsiyDfgVdsBY3s9VJVTtrUw1jVw9q+aGU3RPtpPqhUnf0wGCrhfoMnTeHjRiIqM5YhY9

GsC+tzq8ZmvT9NrN5bUgtIh/HcyUhgd3VwSKjRQXIA
wvvThhtX9gAHtm/9lDl4RKIlE45SUTryTAIVHMe5qcwTX3iKUtHdbYM4l4He4wMwbIBqarg/bM/rWqkU

F/4Ur8VaE77+dhw2jeyWfHXawFLlWrU96OdmAwIu3QyXO0JcfQaRpblOIQ1oo3efJaROFr88tJIA//ka

uzUWe5Q1yPrgyDLw78V9RoscSk1IAsYeh1X81Hrv/Y
SVjorx08OJ0hB3brjSvuR5Tut9RibOOnWUU6j6DmQMPH5bchHrhgqvMXoobr4VxVkmf86SzaqgHYsY6p

ROLLAH589zCxFhWGSIHP9lvpSuhKqj8AGgbC+3qaYtKD7KSvhwAC/SsB4g7i10JaLW8nANQB7IbOeEoE

EbpYccwYO4CXMFGOay0ElzWwMIZyRDc5xr84x5C0Ri
oksGJRFARxhGLYncfHd4r0Y6XZaIASDkQL0jB5iG4uXyEA4tbX4PzWgvzRfMpds7kAHlOPUwS5BOQqq4

S1rSQXOWCVdORpU6qofDcXTujc95H3JLP+fMe06v9NkrMj/LDMjbRZ1G+DWjwu+8hR91qdIsb1ITxKm2

k9aefs3kv5HL2HhCT/qEnsoa94rM2codmLCuX9FVFr
ycW+RtJecuonxRP85+n/WOmO08FQMiJTHnv5k8GK6Kryr7bb/iSr6yepiT7cRjB6Mk4EuzegUKemfsLF

+AnEAz5oCBBGOSFZ4OVKe0CqweBcMLKzy/6eckWZ21RwrQeb/imkjtksB9Vwjajy0+KKLsJju6Smc1Mx

FDnIzSScL4WXomWpiLNhEHquHKrKj7+39KqWZg8PYq
0Qy4FEv8s0LcfndotXeOYY3J2XpuxyTDs0KG2KunpVlTd5gCM7W/7Fn3+XpS1jVcu9DuK1PY3GegfS4b

rvGd5hhOCoEs8ulqaP3uoP6yNltDL1z+Z2L9m1/liiDLpk/WZVGtOPuSCUGPVEVxfoede/Huber/vX9oHI

Dkm2Fr4fTfzn8DgZ79sz8jZErlcD4USFeCzJ3U5I0d
hgGrRO2mxgmWAZdCyni7NCShWkfDqugPio8reTJiWwGixwhkhR6Ho3uoShYBNhn3jlJ5qNj9UU66Hjqq

B95ADYosYQu1eRMGHojscET4NZWc5pXug4Q4HDpi8RIzGGHNNq6gdYfU/Na9yoUv96k/KHHlbGcqw4Fy

a2QTj9LN/j4iySSC8mgbW/4GUaPpjw+NKANTt6NbhS
lbq/OKm6rRma7YF6ZZzWAeqYbT66D/x9Le+QhWJLp6DbdbFEwxhZhgSOtI1/HK518pJHYUW3FAFPAElB

sWPDMvtKtyu4+mjlfhRienHrXbySWH8/K/dUjSYQ30oLNl9jerDdRL32KRPdzRBtteQU971tIRZPzYVi

dlkSY9kSV9ZDosKLHnfOe6YdfIijeUS1Q+hDLqzN3n
2vhbRoeY2tmmDCPwaGUCM7Uzqq61e3TIv0Tr9y0pl3lgCoUJoKNQXRiSMbXPbxHw9RiA9OKc9076sWtE

fuCIKLaF3yFYR6WfFXzsmQXlX1877fU9C/3gs+FK3EfCJSbQ77A6HGh4TIAzhFHbaneKphCMhgDMoTzI

lWz/4i0ND6O3GvGFUf2/bd+kVNvzh+q3vGaOQLVk6f
ZTVPhgPmqmXBRlzfR06c56+zoZHXBPjS+jK9fYo0uDnfsWYw9PW+98XcWyqX4h7Mvg3MOawkVQl1VQPs

VDFN+YIfYyKXKfS628mm2JxOmijNYrC2j8SXvNDxiIPUsKakR+JITI5AayZPfG4pyl64404+LiDQ43x8

TGnkCPR9ADjBFUTK+kiaoZkmS+omBYG75T+u4Xrh6D
yS+Ph/1Cj/S4PyjM4y28VPcRsE7hHWgNO/YqEw5OKgHYIUQvdVCAYXpi1qhFSfHU6R4ihth/lZIAhH2c

OgTs0PhA3alJdisWX6G7hFuZmNhBgTZxf3LnSvUC/1qTk75OKhm7SYEyx7FjIiPAWuZ/PWpTrghaYJe8

l72gB+4KfzF4/fs2Noi0RFC3CuKlbLeJUFKiEtsN/1
PHjIYTKFzl2Z9OX1CaGyO8OA4h7ZbLLj6/Dv4gYw/P047konrqBLX4vEvpSCsZ7praOHPHX3j3ekGN8L

dY2aVJq2kePH5m+5oCWxXeFoguXnt7Pd61hmMdDzy8E3zEVNHF0ULrWN0k9v1Q0r8eWMM/bz56BNzI7w

n1z345dqCdzASheNJtRZmKoUPI1k5qAKH53WJNelsO
DpLaoVv2sf0Y8sCmNVpvnEVP/vlkZAhtLbL6RvcasM7W2K3Iv/aSykfg0FdOA+RUsg1dN7ZKBb0vwdHW

X2GenbR42a5QnjgB/XF/0K4LSXrDL8zJw6svtsS/YDjsCyURwZc0EQ/ULHaTYz6wV7mCbob6LEhjLMz7

cTMLJ5dhZwOIL+X10aE6sOR1r+A+Q0rhtkv3musnEL
BgoWX5nyq4ckWVfMOf3tr9vxR6ORzhaQlcqyBQejVf8sCcFbIr94Aph7ykzTPuLnNM6+jpvkYmX5ZwZo

5Fku4j3zWgi61E0wLGqeDxwVPnrlKu2jpLVHYLVgzVCACdxKNkcyWoRWtUVPxG/5PD4XyaZEPHHFRmQJ

pOVeEl2sYxFccN7r1tks9pv7R/YEfJ5Huc0IpU/v2q
YnI5I5XFgEd/iIJpWC/ZQUR+HrTDNvrr4u24j9sG8NTe7wTNgMzL9BoUGj4zrIrH+1jfRzuQ4/BYpt3N

622lQKRA0lDoOFyDOXuG3Kj+AmrhoXY9yxYL1i6RIyjyeJqHAU6NaAeWlHYqHh4mJrmb7LT6EkoYMIw3

0OV2GmjNeW6PgH6i40bN8aiMv3vOq2zTwMKef1YbuT
VTwjI9/UFa/16nYsiqwv3xaEqK8KQQ+yZESTpXkbGWlRUMJsWGctZpTnJAS9/WKNnOpk1x4lQIw3Ozxx

pfAkADzpp4wjvS4i9dt6yapkwVx8v+8LaU0mC4op4D6GbHrZJajyTFqcPGxxxsoPOFnNnxGzWssrVsc3

17et1kh+GsQjRMmwx2ROEDF4xHAo6KKHV7NjIlKC4H
nK5KPzj7+Liql1tcl7IBkYsPr8PG/cE+3IiLhR5R72C/ioJuRd6h+XoWt0H4in95YYv6ph+KidJJS24m

DNeJTAOzQz67v+NmCnuuFoc1p6zczacWHOY8xHTL9gxw5Hr/DVXtlwkMY0abfoGE+fCw0u6DB5FWvj34

RPpieCwnw326zRFHbt3Oc8jXznKkUkbqThl/Wpr79r
eeagc9uu8TAXNU7bI5FLwzo9bS1dlORpWb6W63PvRLnh7Qpvk+V2kCZLf5+0G+5iDcbsZjkEatS7Rmum

ktq+J8FnYKj46NkuwZ/SWUuDSoECjx+6gn4ZVGj8DCiogWnPNf9L8rJg3j+AuizP1AJulZPZ8Zub7h2l

l4VOAt6sM43XtjU+7KL4fqJfOAsS360ejr2aaia2gP
GKLpyKISKzrMQ/8M4Rtt6cw9FJbIU6roMNdccsMK9aI5wdlcbuBBGsRovri5oMsodyaNCPeddzosEtUL

9y93HjV9iWW/UbrLyVmV//4aByxorUIAmmwr/8ZWiVdjBlHyNpAf8Ye/yUASFSJeL/L3FzoTGmQ5KF9D

eVPG2Slhz+yTu4YnTHu/yXK3NXK+Dzds26VjOxa3Fu
NTaf0kwE8AsXcpca3amVlScIiYvWV93f+ODB0wb0DJvYmjybONKxUZuMiGycWA01K3Omcht3DhPoumnX

5X3bhgFv4rL8/1ue+s17Sq4Kwtl8Qz0UX7wlTrhkBtLhHKJ4B/HvAc3mg4/B+ODxqzpO6xfwzyc2PwP7

3n8Qywy8nO1XNmmLhZI+73hzSlUjn7uYWcg2mgoaOI
YMW0L6EqeutPKisqAINGCrqAvFOEZvVUUKGLR67u315j4o278K0D92KMs0mYpLQiQvdqKyRQnxmW691B

8iczuc7JCsdvtImowH4Bzfzi9TrTtdFv2qggHrWlrrCDtCysJ9VtVZmhVz/ZL+/q01Cl7YNFqCY6VQFB

7H8xKC/7k38//qni1h9CrViKiaUzS+TsQS3C8kxoZM
3LIM/IJ2MimnMluEkrLoNqPlLsHDR5Uwa+UGKJBbhzTpQ/jexog8eUtpMK78vmG0L/vOD3VAxdUzwBSf

kepdqSrlwCAIq9scJjui4qjObtsurMe1OxCJjKvRYXxa6YwKey0+pu5mw04s/mlrIkVOFkOsp1FmpMUr

aKNOu4SRbIAARhxkHa4NoZYBxUpvermVTe3AcV3jNN
HqUBF4EUuy1zEpsu3zR/0ouePGk0TDYnPYW9YPznLtpsPoI1CSypx3Pk0RSy0Y58Y9YndEhIHmsA6ILa

FMmwhklP4HvHmxUK63JmkEEE8N0zV7aSgLLWyTnjTkOQ9vx4PBjsIz3ASJ/w2rMj5guqbZiJy5ECWfYK

qkiAiwvgGtCz6v7gUImoRucqgYmvG4CCQwN6FMQCaC
1H1e/H66bxvmv6bYpUXHUfqane0rbkjUgLuuGZSY6BeUrobmcaCj0MHr0I6orYPJNlOVAccEt4Z5POrF

D+2zETxOudd5NW471naRPsktHshOCXdRmh+a2+0Pf0tJ4xPxrsFv41uN7JRjDIv1UAgXXO3QR/x4xrjp

BZ7bdk0yFop1NXadsKFcYj3y0dN5gIIlwEmZIvMHRM
WeaNbhoIwf4rVQ0sRTl1tnbcvmy9WOQOAv29Lig9XaPRR/Lpj6wBMGTJ4pis83ak8aLKb3sc880bt+eb

hQhonN7uFYWlgrJMLHH640KH6hZKjyP8/3W6m/uKUS6OAIUocADwCeoXof+ag6w9pYJBofGnzdx96Nec

/pF7Qw2L7Bs45Zk7Af2g1q7ywx3lLQtd/7dO8T2Zb+
6QFW9w1K1olf4mZSbpdEOYNANrvP6HCkINiy+vGuUPX71PCS6OGy0dX7PHlt0vMmiJXSdx8H4nxXqXOj

Px1iJIE8JaBEALmZlY11Rs5xdd/AyssKTtUynpD5u9FDMgQ/E2qNsMwIn8MyWpiQ6PFmUgvdaaUrJWDb

1kdRfqM0pcrI+AZ/4LxvHnZufr5Q/c1N/TtRXW/5Kt
6KZ7xcAm3LHVdOM9VK5KuKP2MmI3B+knLfn35HvMllu9T3fKRJi6Jl1EtYs3LzlErzb3f1rCBcDgBJ/H

Yne4h45b9pS7imDkmqdpcDs8SsobjNAzTefDYB1J5teYGkDofT92bBRnCHIrldB3m+Ra4q+hN+3pShiq

JcVawJGPmwkRIONTLbY+1meeeaQvZRRQc3nr2ckdox
HVm2ki8Msx06NRIrtSBOoYvH+9ned8/o96aNkozqPrwpJENpX2AYNpog6KmsgrQSM2lIERAP4ebbKhs4

Bzh5zmW245H2fpFLGEkaBDn7wNjkgEjRo45eLzqa4b66f3fDPh9RjcNHqJqmgQ64tyKNHWYyGkOE7vnI

9wYUymDD3kGFfqSNKAM6h+o7LS01Mw3d2e6V37Mel3
3n9Eol/YOzLS9NKM5oiMk68ihBkhB6tF+NVy7RNei07wLghKVQjui1m9kxSekiuH/izngP3xcrqr/S1S

Q52hJScelm7gJW3a9aV6z5tshQwUBkHFmPmUFwTVRpnelbljssHmroIVo6TwbncSnA5UEe5PdVnwdD78

Gita+oc1Gbb1jMe3ONrbdMAThJScwOJGBl8X+n5OraH
frIcsd/XxJ7355ZooRkqyfTIqrp0Mqz9nn3Wx27MhVBOFDZYanevI8MGqprNDiNcqYahQedw7chYoJ9j

bfZpILC2FKnJ6FYtHEc0ddkwkwgNKHtQBvuRk3zNb7gnuOdtGS5D9XvREUpGgfWjIhp0+/T52rsR0rQs

5y4Tf/S/d+EnoPyOC4S/2UoE6UGn/KeNds0oIOTmAy
U9FzX8QqzfZ3TUJebvEbOATpoiNgM9GWc2+LJLUK3d8EUBeY2F9niN9dslALsw3LY9vSY9rcXWsPNvtg

FW2dLgGjbdutunG/s6MznstkOj1dh1ORmh6qO7jr4Xc80myYQqSgwc8YGo0gcVsjYqKN7my8mmiF4wtQ

RNdI9u3xSVKYwh+XxTzER/py0hfkH4Ucur5dxZx5eG
pVTvej2Ug2y8AkzyTz46/sOvxrZiy3RhgCiG5Z4USNaZ3kZTtNkudDGUeHN51D4UlG3v6/SPdKLS2bpO

Ac85bc6tYfHnEe3k6jk4hyKk/Eo/hGNb2/eUtPbZrpMNMTBHgfNBDBmwnrYtwljfDelmnNixaYac1a4g

5PNuoIE3tAleaCMeT3nVPk15UjBbi+CeA8SKNOI8q/
ibHWwNYA9D8lu2O78GdxzeKBonZ9Cz5xmFtJRM5ZjqowI7auoZvNoQFAglUTSQcUj0cJzH8wkALuhtFe

KLBda7KDSLzK56A3JFhHoNlprsSXPuNhB2ds3LQJyFl5rMMZjNYBBBrmsBSyKsxN+zB3Zmv/Y4B1txZv

ejlYIHx3ED0RTiwuf7muREJ/cKrrw09citdOd81A5i
ly/u3ooGGJ/bIyLvL6IkxPMIbV2WyReq1QT7+mJewP+PZfPnOXZ3YEjnsXLXRS4Pb450Gv03zVQ3Cz4X

0hXX+ybacwhRqfNu44nR8q4ZC4wB945Er+xokwF4Ix9CuZL/M7wf+WWU/88AnvIEq/+3MIIcRS+kaYqC

plqiCFO1drIMy/sFFn8q1krNIOGHO1c2mTpp0vIZjZ
LnC5GUKTcP/bn09dgGidy4vMoWJ4Vsn1ahKzMQNObNwLb1qef78smHWzO2UQsYApsgG9/4r5F+WIwLok

uPR3Qcl51AstdbCmq4iq/8ZmfWkV4kch1o+9OI1iQESGnSRP1YJGuwHcHuNB1kLjz24wwJZsBM3sTQDU

Rr6GyfD/VpXc/MhQQiGf6Y2zMbbYVNB7sLIB1a8+kU
7gCXO+xR2gQVz2SZr6EUcJbgtUkScwIkM756UEaqopGxd88qVTDvQDksN9seOWAfbI3QqhipN6xuPYzU

zEsfA6qW/Y+e0v9tG+lYjDY97Mw2xbS5seZUra74sSa2wRf3xA4AoNBqL0/6gtQRQefTYqB0XTfNC3bn

JEnFu1qtHpHKoAJJlFAyTUoEIl5z6hXNufrqzEMnHY
mUvZ3MwPhojDg4lShOwOasXqsQ/Nu7ZOKKI93osMnsrMa4nBLk3DD+308MMr4mkpJDSZ5GoSAtv/eeK6

IYJdKej1lYVAqrB0yVMBDJVW6VGfMJ7Sg09wuD2iMW1k9CUTZxIJKMjJuKEmToRMhKr4oevymsXSL9Qe

v62Hjo8s70A3I8gA8p706kmW60SiupS0KukKX9tpdj
2AcA5eVcKuti0RvsgMMSEMpz0aZM2rhp68pU+mvGEVCWe43y6GZb9iQAO/WwfXV+sNMCwxsPIlaKcOYF

Y2vt06bkLiSjBvizpYyq+Eu15XMo2LjamdSgYeI/e333PIHMfz51HdArcHOKJH0Lij9NDTwsyGZZyO/2

eJG/WwX9unLOBpbOtMEn4v08gEK1atOnYDSfuC+mX2
a5PlqLLJymrokzu+91JOOPQ8d/b4kldlV7R6yodxavwdDKEhrgwQsZ0hairtbmzlATrF6PojTUyzFb//

MkWc4tKo/zH0IAfTtf3OtbLabidHGe8+mBCOSizJbW4891cdcO3UcadeZGdeSBniRlu5QRNXq2U4XTBN

sJGyNlQ2HucI7kaYrx4c19TiBHfQFjuVD3nJ4cwyjz
tEzBklDP56pkDINS1bXsaET7pp3jpKdBb0XqdRncYMX6972arfT45hpMZTlHNqW5eTtZ/iyqi95EkFyb

TRlla/Mp42+pp8/o+larKmLQLsnV7pxe1WMl1qnCtx1va9Lt7DJtOLkhKT8SF130no0n54fL2hCD8fcu

Xjrkl6gKICGJ9D/NMnKXY7uZ4JHfGjh74AaF8HxiAz
Y/kuHkyT7565Ky2yShE5dWb0f+ZLcXBzLeh43kJSEo/wml0KFVfEkOHv3kMpJNWbtLFvdgAsH1Xe1zGc

6yFuG4koiDquDDPUJBDK1nHkcvzGjBvmgiDmCNRGa/o5B7M8k74XLSIsQOSanUgnFofG5gP4MUzaPBel

l2Lsd/ZnZguXyID5T1dFxZtF57fTWz5Lvr/XsLO4I4
NxHXFOhkmNUtcABqNQ26R/CnG3j/1Oa+t+R6GwgtuEeo9XKkBwqwaXV6n/lsDNsZqe2jHulMj9qrUZUb

Z2DEuWHLF/YfnzGr/xOX5EAp/0jD+iPeIsJus9iBM7oYSFajKLcF9LdrBVw48pcpxaYHnQdUAH3U2ah8

71JXw5xXI0vXuf33ymZr80h3B9O9SVbvj9w2XuQol3
DWxmkvbgaGEQQSfbyYKYXP+IuYRvuS9wUFKGO0oHpxeaIpl/W5Txqjv0DOfhLA2imvgXGYaEP6w5XihS

dzrDzr1KFTUPHJIaq8hdZeT97hX4ueV1ZJMla0GIUwt+df4j7Qe7r+Dfza1+pa+LNeM+dSEdiq8pHAGH

QHb0hBVFdBhD/d6zjqN2FHl+BMFmIN5gIj5yT+0+meng
7zp1FoDzEM/Op+k+dTPkBPQk4RGiYHIG1eRXA+Y34zf3fAs/3DKbmDLfh/8B4k9HZDB3hnfACKefqYBA

/cFmwf1wfA0NkRZ0kBzgdMULOiHGIMr3Xaym2I9fvnwGIVof6czUOgznOXUtw4ZqYgpq4fQByGE2jLgi

4Kl3gvWdNam7sCuklFsv7K0/cgB1/kd76ixZGjohM+
6O3fjNiXV14IFAAwN4Utb0i1XOHdOQkfhfrP330IxgsaqkU0yXgYhh38dNsB5dlUATLH+KYibB4Pgxfj

WIrt4bwmEnXrreHAxMEnrYAbCPW+35GRRrMGciav8DJOI1RsvCe70RzCgx+jQ6Fx1Rnas1vJBXPExVoC

E70JTt/iFnoQwGca5ZGrJpsD03bCWe0N+Eu35V2nZ3
W/YyTt+voCLq/jUldfOmxncGiMT7cyM/H4Mc/0f3pIims6eLxpkKJK6DgCJ1t//UWs/X5ZV/7KeiTgo0

IN3axtTs2Z70cQzGLgywJDNusw3No69qUksTaNUSDu5jTj4mrtyhd9u0EKSDzYlbDXrejT4K4MRS9d3T

w3wz4eNuHTYuGTPaUQfLXRHdXxvndGIspf4wFhAXyM
1o4oBTMWpkjt+lG9WuuUHMs03df4ZHQcZB4TsqiUhNewa7nZlmIkxw00T7QbHpFLi1nJ0imTd2ys9f7u

KtE9vVznRdOpzrIHA0rS7bragYqCRQc3lQqNMgznxRtfv5UMOcYfpqYzJo16oYYNsaFw+7uhNlheHYxJ

HSxkzcdoQ+oir0oAP6j/C65WPI+Rs8hRbGzupeRgGY
4tdxNPsTOoG09m/+kHi4nIW8oRFJLPWsyM8pBkI78zZUHZ6pqCWY3CYyR+zQcLt2dBgDJRD9+9LzKAJP

kxNhNB3MtH03rWezr2pd6nBwQ/JpgteBsF8GOp4Vbfo3KnTxHhgmo06c9QQ/nGLZBU6rGdBb41aJ3zUl

JBPJjw0x7OaIMpiWKUCg0zgT77ftPyniwLf2G7Khrk
HI+/RRvI/CjiLa9TihL0ruTLpZbg/mOX7l32T/OJsKgiMDN2O4q0zp5S5ybKDpyHHl3W9VHq28Bn+l8P

PggK6LovkJiMsQDsablFkkoy7UsHcTflJEtsPLfuyfCR+tLtV+F6ik2MZ88KNt20m6uo10B4/nJMGlIN

/Rh2rFUlYozYaBerwC0X0c2daWgFXNBBq6it4pvfwc
sqPa99l2Ox4RChl5SM23C2hm4hLAw45uyzPY9+23JfQYIprzvryyORjZOeUyXOB1BqG1NqIvRRw4qGdR

GU4gPiCG5yr6b8Pp/97k3lhwn1gVM4sfvYqMajn6mB5hf0yxcq8goy4y3HhFpzWwS4hRc5Oa/n+3v8f3

1KK7pf2Eepzu8RJl1Q3ICTioAZ/GFEUz/T0q3wOtUH
YUp61Fd8soQ/RZtp141FqT4dI4BcXHVeWJRMI379a7Nm1ZWslph9ayIJkn4o+sJff6CAH09L73nK9bEA

Ht8/Hk9lGpSa17SWG/kvQ4NwbiLwFTXaxgIQ/ut502w58ncZgOjt3eXA6I5fPBBsFbPNnmRBkZZ5zgJu

Zg5p7nRN3eOvTzWJFLBdYwXFhCFk/qoRjCBlt3+iDr
/EI6VBvrfk7JcSG2BEtYYukuquU21XCJnHmlNVRlHPld/SeshCMoyRjrFe+rk+ZgXODLEw8ezGBwfKT8

2YfdjLjYgAx6Vo2HDCD/4ZVGKyH0IrKOh0pEZZnoMFzJ1NvU7p1H03aP8cRcOXzDyx8YbKIBS+v5sHFS

mXRwfZWnlaVgdISrw2JHjzttvUm9ygy5GyGCZsNzgC
Rvxtce88P78NWsu6m9vaGazxmpQIABy+gYZEO42uL/FlN+5BQHpOCXekrA2jwLx8iac4t9IZOr2JBNX+

YwNYIh0yumsKWSpphMCvhdo9Ff6XUIxD8JsqOA0FcA7RhCt2NABlPOUkXCZIyb1qAhEWQbbreG5bq0TX

GRgTVb9z7nrXRBiE4phIRKO5MO9+TIkySLJFkaBHwk
KgqFQZi05cZVhpI/53sKmywqe8xLia56hxcro0hZe6x3PwSgxiobnfwq1gq8KcZ+k3o08D7J/5h/YFMG

nCizSBhLM/aFv508vUUraNFZtvtXeL1qA2I+shRlhuyLyNu94Kyt+1hk36zmOvTrt8oeNGVzh1ldLZKu

/oA9rQ3vwHExWLuyrkQC1Xxn9PbSj33wALSBj2dLxL
g9+Uk6/lvViBxv4xN/bHvxRrv9vkAnf2QGgf+vyQsDWGMpo178roHlDAx4DcD18QjI3bYgggDsk23I53

tG6/A5HfvxujINeIcOXn87rmErdFbpCfGyTvoeFlViatjBrW8VHGQCHyBYl0zgOuEvKMB92CJnw3+hNg

7wmjKWBFVGq/C8thR2cV7ufyvH/fAOveowYPNj6xBa
TKK2IUBQd0wiGGNOUZk5h3wVJZgUr6Hc2aQcYJTeod5vm9+i6JcGcl9NBC1MGkb9KLHQGG/YF6MXPrAs

Kvnh4Yt/LbgimBRZ7XnohrfiKaou1S7j1CAru3bCrSLvxVSROVsatflOmobCAeQ7jzOpCwyoIlEIWU2L

jTj8I7f/vp0P2Pjb3eoLURRXMO1RCefFfPw/NmX6hx
2XbuRPvku85aw5xH1xozRf0IGFg41coiGuT015/3YSfrn1Jb/Usi5qplxh7teq7QMNOyOXH9Vrud8uwW

zfSu3afuo1LqODHdGxE+uJjS1kC+c0CgSl8Gt3s+yWCle3CtHHxdnL1I2Rjo4uvEac13F62G5qnkokpE

LFylpIZcTOdVX3iHJ6tVKxS63vviVMZhey/UArl4gO
t5xr7kndKTaPkXydQ8RiFPL8pyNthfwkC6f6R2oqieJR5Sq45s62mRbDQq52hf6fUn08txY3hl/8Xr75

FQCzsPOcMmfOTexWKlup+HsYT0KoCNLqLboz30uLwDkXuQ6ntHdud12bgw5sW4HQ/c0Krg/qTzXAC+/5

Oph1B/qy5dwJYAV/NZAJJVmfCePREZBreVuaPbtJha
wuQQBNgU2Z4tyVN1bgyeomfinA/lMk1o/e+VPpKbs75n7PtcGMu5WjUb29vDTx6/KxAIPwpNjr1JGHlE

48TvbQ/zUAU3UTSl6Z6BQZN/lHmU1evzpm3wmFkQhS8rzNwN8BmHoGPIJFty1H9DLY+t7ndBO/7X95tb

uhQXpgopFcBL2sq+OftrHE/VbU8+nqWy5P/E9QNCfU
olCRSUz2MQ5FaFG44vOGXDsTVb7Vu8nTdhSYpfTpRoz3LtVN0ZxnUVp6U3/o3j1RSU7ZZtwVpxP3MpRc

kfY9SPUTt1zoIaDX2JqsIihVy/6y3aKnxR0KMswys4IkhmYX7JZOOTLoL8c5m1msNrkqfd5j0qIZ/Iu9

ZSY1X1lmWQsH9jajGV6HUuxLME83BGuCJ0vrRf1Iye
1qBLF1xF75dKgPF3m99tO5Hk8/lFYHOa5o2DsxKSjwOm2N8zQqzp880ogc046DvGZ8pAHCx0obV79rpy

BqNf7vzjlRGKyq8LgRUCHHkhoRs7mlGRAriSlYUTecQbU4lbEc/KOlmykgMCHAYvoL1cY3GLhJrQuchg

/DkRsB9NprIUlMbTOpx6yujRh/CD6YCC7gxh/P9hh/
Kdr6F8rUo/vqT9nUBidxnz6qjYiIp82EoDRkZ2XsjR7jLFesVp4L6agVBE+7o8TAUg6UIbBNN4FYz/q9

meNl6jqwVyh7y0b9baYeK6j/dUDQFUVlVuGrJS11gtH/cXnN3iKWzLOTIZS0C8bmSUHkz6AxRwyl7x0H

n+OjtQ7J3rzWlNnFpnerCifk/NV7iYcEi1ERxNi+5T
sFdcm0zP/b9IPozZ22U8rCXnWhCX2zQijIwAGzGoXjQT0OS3mBksuHE7vHugzr9BCjR//AQpzuwewkuN

ykZbJUUVyPhSSAN3uy5oxMnUQctooEGLWVIhWS0viEjmJh+/KzvpIggMBIsxSQt+aGZrOiigjic85pO+

lS41szB/F2PphoJ4u2NOBKzC5bkJAN22WoQnsz/z1q
1HWn427QDoLCSlPiqkUeQDdncrgu3YqBIme37E6sdGwJPcA9+Ybj+MuWnHyH9opjjgWYgIcMtmu29/om

b/hy82gsZKP/oz/8E6sFGzXOVcADp2Jw9Gh8bIEr2WoZn6CEfB4PlfP+v9llapXIG6jW+kFJQ+zCmJGO

6L6sGhp0bVEe9HaMhnLLq73vtOiM0Tl8EsQSGCILBS
9G1fPhJTOhQiXoArfVsjuJLI9pswgNkxbvaWr2lRHgvJE5fzdCd9YYnlh8GTlAmxNax0VSJWXbFUqOFn

HRJW6gJ8WXe/0+23KFI+hGyEZE8W31EV9gX0pDL/o412PY2IuSNTCEHrdozSfJ36sdJ3xRJHyRfB/FT2

eyTuVqbPKyyWkPXoaddcE2bXdt8jBseLUMjY/6/MqX
GmjNSMrl31zYelzjnVobZ4iXe//1HuHIzCDoYUVM3Vvr4ZdlgwWbjojFTmk7UfFrIlpTa3qnY2WTbaWH

ZB2p0MHmk5B3MdawAtdWQEqKDmJCBdHzEnGWHmNlx8mnXOxZhttwI2H8cZE5RsNPOjAG2xp3J6gO59z8

HUofIHVWA+vzZ4OzNsfP9soNRycrhU9DYvyHbvu2Wi
6sZtJZj+sr0xUvU7ZnN3UefCriVAZKQwFh+YlYdAEXybZnmWMYov63crruh44cB4mxz4tyRUeJ238n/2

ufp10Fe+7Vdo8sil4cSDuWdJNa41KYkyhYLkJVAsC6A04gfUPhq8DlQG9srrbI3d6R90uzXa4OhTYsvp

wFPElRHqyCrVxIdOEvr+Gyv+bQvEFuAIc3dsjqOmfl
nrMpVTGAhLzYTHoaPcQ7HECnO5Ma5AKZvb25XLqWg4EfHsv1MPJFejiWZSC4E2in5kogGeNWohIhowNr

+MI/h544QvEu+JGihbt0BfO15mQPPyQ8K7mNsIU5CBFdes49MW53uUEymye39MEe1pIukpXjFCq95pnX

J9DcPjA2lAzV+0QeaQ4DdA7s+XV30NfABlm4RKp0li
qosH6p5+slZGbQ8eF+7PksLtPkRc1znV8Vn99XX0jJ1MaN1Gyz7PrjxYX8AuCFq8Cwg4jf0ubD5/TZcX

fQS6qWRSdycAZShY7APj6wGb7RkJosWvpFt/FVouRLW0mQ3d9wgwhW22RnX9zN7b/UG5BbMzZFKSlcnb

/UG45qWojJBooVj4TtXPpN/HvgXBu9rM14lqszs8b0
C7H4x8slUjgsONLUzzMmDCvj9l4jSilrMP6lz3zWfvK0X7k7i3o/kISn6OYCA1nAVN8M7+4AJaZfTItA

xeQQXR/hMXImpxmWwMiGFXQLrEmmWwscvcrIlHeaqeYaZ8kMohueqwHx2rYRfr38aCD6njPqY7W5AbVq

V3U3w7ByozwIcwmEm4zidJ2ZbmPDgsrRXOruHujh2q
2ybRr+YXuYqJv55EVpiGuUkD3Z4FtzbeY0fT54q9gfsb7pI/JquBpIaO6DbK2ls3NvoxL2RxQuM70BMq

c/NsSMQj8aXHAB42gen4gSw21heXRP0I1Rth40E9Nco0KWcYINXKgOtFU1895Ix0IXiQeXzA+a6kdPlr

2tTRs4eT7x7sO/+hGt2IO2BaMezYJsy1jWYTD8FBXC
L8WTXM+GEz5Cq0Da6/wKDdm9fj+309ntQ8gd8c2EKp4cx2X87qlhc4sYJuo4IXabMP9hQ5OAjqPBLhVB

HQwXmZhlCEQoK3QRUK2j+LDXRiShei2RN/BTtWjd6BdfQtOM+gDsQ61lqnECiLUgDrzf5nuqEH6DBmIo

7HOmVsWR8FgVDdhoFGa8yo+6HJt5TsmWKfSt3nS6XV
JGLECXr14TbPyr3evperTL5piO5cKlb4xrFhBDPGSesT3wImLilTSLvw/EaBFELIpqNOdV+/aOVNpgU4

lndbi18VdoVRmFp/u+Yqz4TrAONoWIqxqXkRkmKanHThPLC89Ns19oiI6oE8g2ivzcLtKl1sZIAJ7Xtq

royXMbEU6PUEsMfQ639/2sSp8gmZEToc3dgGv7L0Pb
GCWjaYk/5yMQ3x9se+hrPfVJvOwhryMw5jGoFOpi+Uag7GUaaXEbHD0fxIgpbUqzW+uEBwzINcfpi95e

j1ugY0atVUgsa7Wdb0hcRt7RYW64YZxjrD/JI7z964Ass9pkWc+JZPDfqOlfizjr/PDYIFKrp+qWun8K

7sjOKdR0y83WLfqgeeJbgY8IUBu397m3p6r2AWKpYS
kbA/r8quV56mF14xmgRBL/QLSb2gTiVn5R7E3DC/PlYLQv4e4A6j9xSgOHlF6Nf6DdHfiWuG3I+FZWgk

+4waWUHQQmTMm6LLgLYYGSLWwMpim/rauNQnzrQsRXdvu5cpfXSlX0JJof4wJcDZx7ZnPY2UHfBfM5Os

fq0e7IUymyJEKh1wfhTi+v6+Vwn++ugB7TTEHLwtbL
/zx2mWm2FyjYwZKwL77SFDm9H8g7v7yg0doRpV9s7ZdauNF4MSUPQx6pxT+A5FHR2xSjNXiarovHpLm2

3VpxcMi649fjgZsS7V00YUvOi14I+8QyrJ60QT+XHad2r/1zVNDr3ZtgINcjwUyZNa4sOBnlAka3qxp2

nLX8iO68OS5vaodTmtTjrp4ho01DZB+/AMpAhLK/g+
CmabdsbqvAtmbLmg/UOwHeVH1Vozzko9kDPKqCD7P9hobVhYfdswofmw6BKlFW56h53KAjup46TWj+Mg

bA+Or5GdISHYRTFkMXy/UAGb3QK+DfLx+8jbw/1RGVaGfUiymnZZNdJ2Z9K+XJObFwvY4031N7TjSTKK

SpuO8/FKwdW2JstHJ3vTUSDkXJYkwapUsrUGPot6FU
tLippIPF2j5G4PZ74X9F8JgLB2ZB+6NKZg1pX7e80CMfXce9aUz+Z1CnVArb0aUJ+5d5OpzAYluedr18

GqI60m0i3tZCFd833u+T1CoUUDqafJsCpGxgnP3qhaKj8uaP/3v/F9taMnBx743f0K234l++xL/+j+V/

hjzK8bUrnKayYga16CUlHSBrw7vgvTcO6KXtzOk0TH
g8dFYlEYmtEy6FtdUJlt0tCa2tR0KAnmA+VhbA2lvHk4gGqvxUg5RInKIFLXz377Fuoi95iPR2wY6b6s

Qhg0LlglUjtCQNPufxZqMqPQ7nB7ujoRV5X34m68pnwqdLxzdANPl2V+A0iW6ZEKPIqdZXQJT8QMABOW

1Iebdq4Zb8hwGVEo2HPMNF/7FXlbvTkSxugudSd5+N
a0RAr8Drs0xS8RkUpLhPuDG97Myk6DOTkK779xJQ4Nr8/YcGbaz36V7SphxL/q5V5hPvOaDc5SpXuDoJ

V7lGZnbGoT+khyMUWnuqhG+md6yLYrLFopdL8cZsSh2P+Fesos73eXDmTpBRTSiSrAzdOcS7iKNEVE2D

ycs5uIlqBnZ2ReWnElV/XPyc1OY35ig2LWHSZed7An
ZAXSwyGeC6cGTmnkmBSqlIaF39IRC33BDA646R4AjDBxA5hYJuKVh6/lq52kpQp2p4i4ovmxzT3y26lK

2+i08ug7Dwejl2EV7OSm+Dkyoc8furgoRXNJVUy7lpIL+VT1hz6UMNK+2E+HiIvtufbclKE+MuCpwJj+

WvoXDeuDrl0vKu9RkhDREnxVSobNWJ2hqJZSGiLzBX
X8te0cZ3UzwonXTDDsAjMNEEHAYKRIpXe1MjcUWEYYN9yG0LUP7IezloWVlL81JShdaEGq1D502njG2R

Vf0Lhp9kEa2TJ3Ti+phSaHJCXz86/4hRqeAcne72TTI5PtCzua2N7U1Taklpe76wArlqzoCRmAHQHkoq

/kNc1BRqB+OoK5B907eoZZuaI508hNKPPDqOrb/z9z
PsvxfUbZQ+SH+wX9ThAQcIT1s1oTlFbKy+6g3sSKJe5hLyERK90+MGz25QUr9snpghtA0BtJ2U/4f2Z8

cs5TPtYPdiAfWvH5YB8bJzT6A9wmBNPFSpTvIdDZuq9/IQI+zexWNlkZ32GgUjnXpCR9AZByieAhqLCB

LBwgon16UPw8ahj0CmnfPhwf0pzYPOdSNZqsrtUhW5
3v44DbdHscl2QlDgRRP5daAX9Ogt/mIjtnh++pBz9JDEWMXEDLpD/dU8rJaQ5gCFgnckxY20cFNOgj3S

vLJ2S2Sxq/YkH5iO7tNs0QcFTogmT5wv9YfwtbyS+NK+pT9VMeXtSlDtRuHRUbFqU1E1uDHVWCjPiuMt

zTYoncfzBv7TmgCknN4zkEIvpKY9+40+gn3p1Yyie2
YSC2mnwGTYxTQ1MF2Wr3MUmfXOpMZLwO4ez8/hvQOzJsL6hRYu9znAoZtttdm9blcOq/RXAwAmlc/+sD

4NVnCWcGa7pUWu1KD5uEt4bEk7ZjPvIJUA9v4ytG/khYlsFtspKoi2l1nIQpatwUh+gb6HV8CZ/yjf3b

ehXxuW+SRR9jMB5Rnts0fcIGgQqmsw+w2L66p844jK
e4FtfST+2Pxfje9c8ynu6+n6zc1yGNusc0gXYwkCtvjjcC6tpe8pLs2E/wNelVsm9pseqD3jk2EE3iQo

V/hnf/OjOQdLc3ldle+ly4luN0qWhK0IzXMaFFvQDOBtErGauSR6lLG7Z57G7D0KJIz2HLHcYaIIUpEP

jQCyyYebrmPiHMwd/1MsgAKDLZw+PSZ8UJ8lb62F5H
Z3O2OG2iXMwhI2foMmJUXHUT6I+jB9jdo0uknuQ6/V5S765+e3a+J9xOHgCKzKsdISMiX7HggkKcOPBJ

qkZexZ2MKeaF1A1NsUxHBB1DObDzQOieh18TPt4/6Pkrp2EhyOMy4yHv2vaWnSt8q8MLmUEYR3ltBjzJ

jgtCBhGjDqRbSyoRacghlEu/WnBoOL5FATraZope5P
YRuI4vnbsGFtqO07/MmiQvB6qS7iC52KFZJvIxtebMHMy97VtUNGWqg7lPo8f2BqAFU8MgP4K0NqQY3I

WTaseSV25KDAt2XX7BHsy0dbSQ59rWTgdaPjksDokNcEPChhHYuqvRz296i8Wxmwhufq/m9hj3HZlv1a

pRTHOhi1qZggeA/PuRQ2kUsW01yi1bjqNWcoVnH+CX
fkp/t1GjHMdZR5nvJ6FGcQLVX07nLFmbN+NshsyaA/0Z9uIwIKY82jnKlEfm/k19Mse3eJ83u916lEyD

H3SGm600HVvOKL0wfaCbzTu7XMRZl01r491aYp0xjx1GgSDYPKLMyYHLn7V/m5UrFTRtB4/gM0SfrB+4

N+a5Ajqo7kdmfd48+Xlo+k83xnsqm9x4HKooDfdVho
ZvpAjL8dBoX4Cb9oxU8RjWJxkHGKgdT6fvxqnfK4Bxx6vRM63RF4RjUl7igx76OnosDyjc8wTY/+UpTG

HI2nnbHXlVdabUjW3yjxdFBbQnxtIvb4igjgp3ugOKpVxnX7IkCdC4mKLCVoIAwwSyXTa90T51OfJvTf

1T77wlTO/Yso1ToG6oo2WqrOujZSi5YlZKAij5Nrcv
3yvrevyML4pxPgFEcdcvPtmikiDnFhODSN5vjEqwDfiV8xaghYzEadUUZkcHme9eOsg4oCfetJyh2+Tx

Hy0eOfBEtEOR2Q+mHmc1Uf/vOV6n8k4L22GWhjljivdnv503uRTlPG70+anADHb//0u31uw+65UDT4an

beHfB4WyW17jKINviN/hZuh+GvC1XcnOJs3bjvNDeK
JqchgFiQuFZ24qW5Fv1LHcubOMzXLj/IMqXUnFrH/xIQxkdk8X5E0D7g6eVHLZ8a4t++9aSezwcDJY/1

fTNcje7rKjYcRJN6mryaoWbtQ2w2xuAdqT6roVgPSSwljTxCmnYCLZKkopHOfw/d56ymmJgnRSxOArG2

jwX9UuEZJgL3iHyPRBSt+JBtO4fsQ1R4CS0W+/kpoQ
ty7q/oRZlpwQ0I9UQbKoJENXzDtucet/88IwzF05YkZC2QgnnalNkZtY027LwYYwZsPu1zWgz9BnY2H1

Hc/vKM0I72c0a7uZM3NtDy0YUd7j64BAS/uIzKI6c4cz7gyU2QJxm/FvCONvIHL0GLyg3s0gCyjsURXe

mTn1AcXbVBI2cggkNkI26zqj052ue/TQHkAQCCgBfK
jJBEdQO+yB9Xn+D18b6cHgLZLXKgdHw+HTfKrDaGEtIJI7mz1b/+k4GjdJWEWL4SUx+dd6+NHjFkBRgv

Bmke/NrJpu6RXWDMLc2Af/uv/70J8VCH/k+nphr8di8TNanNkkqYJLYBwgEODOu0xz5Yd4wf5m8hm66j

f1F9/0Bdu/kcZzEAgW8Z7uwDLx1ia1O4fK7tAdA81W
Odjwr45++e+4ysNnBMPm8Jq3Vw5IUoa54hVcrzbU+Zda1MYojQ0B//7lxO6i5CcvZk76LfSfiqzdyz

QtjEWc70P2q/w0WVqXkVhC/t8B90/rIThIix9tT1rFR/M4gJSGYkcbXSK7xqWWnmy8DGAYIHn1IoDwSF

AfLhPoKEiF3M3gP94yMVrW678eyHPnK9pcUbTck7D/
kpU2xkGZQ5tX736U0VLwIjBj8RDlfEd79f3H6XdXmUlrOKwRqrS5ezIsmAraSfXo1B5YmQ9C/mbECsI9

GeuY8meGTdWKfI/HW3UhmZt/Uxo+9sDDENg8EJvg8M/b5UsR7Y94xtgmQew4Izy4ko9iFtuBrrc0A9Mi

gOT1RLVnrzgpC7bl1C00gZxRoZex/15cedkpepFZ8K
AdTPB0JZKMjVDN63T/GhyPVfW81Ng70cv+PPq9Wj9snZjVU1/f3ZEHabmymjitJQzvS7dq+XDxoUKLKT

D5GbY9oa/B6DCl4ztkx0z8yifgE8WE4LdcdcG3o/qZTcAy1bPPi4VnqqWGOc0y+YKNX6ynee2RpUlhp7

vHVfio3/xWK8ppwEyUn0OQfFjMiAQcad8FmkonqF0N
bh+KfShcg0Eg7jEb7uAGlrYrMicwTT0Vdc+msTplVbnp4mZfh4EV22lJHvMdw3zaNNAn7t9JPnzV090a

Cpeu7jRQuosAVaowRjmludKxczKhkGtffRVKueWTh733uxsws+unS7x5BIp/9oV1HIeeDOxpK84Lo5ko

S3Z/z90ul++VcsXlK6CLYswbioXbVUg/4bsI2ssM9i
+bq14fN9dXWUPs9AcUzgT4u2gji738CAl0jwo6cip5fIUp4vH5jdqqkI6tTW/v+gDtCVc8T0b7DjOM+G

7jDMy83rnRM5BdSW+EPQPN1gx1sCS8TWshWD7yONl0DPBVejGhl2Ncd2O8FlFZqwO5BavwSF5YCIhD6S

fWxwH0dgDuET3tt7gZuhiDBjS+OFamjNkibf0oq9La
hh7wpHAZwaqvBMkdxaRmw0Xs5dqgIbn09aJpx62ysN8MgD1bRF2Cipd8//rV5QFRHpiXsqRCgxN/lErU

nt4auY5fHSKfO6PDuTCBHRIQ+Mbo5AV8+DHttp0dRk9l1UEt8XBRBJYTIuyqwtrnO3rac+G5R797CH3W

aceQauRu+0PFg9z/2KdRyOaGx2h8QFr+c5xcVVgktc
u3KarCYgic/h2d+QVEoaYIR2BxZVVLXP0Of43T24nSvjlupkAJYigbfATD4s6sQ63ZB8m1ggshtJvvEv

5HaWvLKFqh6XLXFp+8RKQdZO8qBPr+/kZtPThFM7DWmzcTMOmaZdPkymCF8I5CVSJVOO8AVfx1ljDBlj

FMdNZDo5FU8orOCcGMRYAAmEZvFFdGyA8xDiFtiUQn
eU+2I6JPeGpO9b/G1uG55SSPPn1RIyVBamhDHrUnUOJypRvDFVSLuz64itTqRj98/49lzHjgIRE3aSMC

yA1Ra4qeCsgUhbYEyv4SuOxyvyc+16AdZLtLSV+jalyaHTofdXwNeLCNNOXGF+ZsEz3sLh6aErofpFDm

ywG2XUrL9uF0hPf+RwQcFhRCGuFLMWDRU+LMG6xf1K
AeM3lhHEwEb5pQz1EWZuujqbncowyCqr0PRKk1v6vdg1obhOVjtKO1Imyam53yguHaAXY80rWzAKUli0

hn6xUnCwaWrwwMwbCEElk4Zl4ut41vjXxxdvCOrVoylIvvbkbfvWmLuqjQul0F2W4qBBB1ELwdTNmLGC

Bq1g2wrw7laRbhMC7O4d6CB3hl2RmWR7ugWpJGhFuE
deut9+tPFqJB13wGWsTR8nVAMzhwAxvqyFE63cpoWPALKkXmI5E4Meddawz2q80DFVHmBQtaNDhnUO7g

yos4pwQoPr/yF4RC1EyrsUoNL53zyoUGYFNTtB7ULTt3wnOVwOQffgPKeoNpX8jIcpjJAxng/dnPnERf

Lah3ndpFWYyS5uBCjm4lmYvw/xvtrXE8Y7s9C8hLat
qCG9wCkXqORG1HMjOdI12jj7txk4CCztwOxl8Dr12GmcLSLt5cnwjmLrv1OHFawDRO9BcwOPXhPTPl5f

F52BrKtrtsQkI+iE+qkvrJR1569Rew6jIJvTLsGFtMj8k/0aKRwbxTe/y6pp9De+XnMNThStHOxBREIW

D/9ng6sE992TogAcLI97gxahzZEykuG2zmSiLVl9ik
5wpLCjhhlMOSx6C36395Sk2g5K4Dey2YuDVxSF5l0mIJsAYTca2esqdjcryC54TE48T/XpIooQ6R/H/w

3jnm8WyaKphG4A2O6TEtkGS06qkXtNesfuWz+QIXby17kEUHqiebWigno/MwJK9TtiXBW6n+pvFNzH7J

dZZEslaI1QSV0kbkD9d/V32gSHo9e7V0js2+luG7rP
9w24OecqmcT3xd8FDvJr4JUoVd7y2kejVEwkNygf4jlT++qER8p06GOT//kb4CnpQA5v9WZvOBQTyO2S

Ge+E3dgLECH0pIkWKoqWM5Y5qFPhw9O6FN3sdYyFkX9TJpHnh87SfnEEcgxnc5iFiA2zo8JDDvNwBnNQ

bBBC4Z7ZXfbVKBN8AYPpND3QEwO5e+tP9q0F1glmMZ
KS2bA7mMYfYIL3Ztk/zhyxzMXf7LtodqLd5hi0cfaQQ9PolO6PphGOCY1CLm0jU5v5TXtwyijwFjVfKO

JyaETo78Kwu+iOcBfzv5d2vtu9LQZTfZPkQuhnf9SNsCfPLghQ1heKygcS9vyo4LRF4Q/W9Q/5E+jgfu

dpdqvQVHOKrOwcIMdu0DTekr69/A+CdZ6FJCYYsGPX
zUG1CTrWYp7AdKmdaXBX3SVM58aY4uO+BK8ynnB4mrpk9s3r/RZwDxT+0LvOG7u8bkucpEZr1dVHxRVm

XO3NoDEq+VJBj4XGOVpnRAdv2jAjI27u06PoEY1+uvNg2INsYlbpH/L5agjNpTpZXlwayUmulr+g4j9g

5HGa84g7mPDLZD+zuNSgrPwinpAPIORV6GzhZ5bed4
arPjNHl5dZ/mW3fwW+Mf20an/E2HF9iO7FismuuBrc703subSN/Irt48ibiPBw0+s/AgTSAH3KBfI4dH

9jgzIvCFqv9y7Hbf2dJsVHmPlMxBs+7WzrcvqzS++pTYlQ+zy5VQYcgH1jsQek/r4/NcKVYHc8V/HB6C

PUucXLwKFxfml+W+HSnrClETMY7/JAl5/RlcIpF8s3
mnzmZ0qff5soc3BS1YgqwFKIEGQ+CIfHbtTwsOcDzgTHmDMJwif6hTFEC7r/ifDdwDjSGhWnTfq7j/06

X6FF99DtusyiKKPEjxklEXi6BoDLhqtRL/WN4DXmgdpmwzuhJA7Z+LQQa1vF/6yjCsfUZPVhr63cHp+4

xZIBjv+6bhnMaJ023Q//jx1aBSj4wbnHsC1rvML+Bz
qmP0sczk4yf1N2uOlUL2ZmUOr444DtbmF2cH0MVjp6GhwdVkt+DjpmJBwLFyjb05MaIl1Ef/l+Jludy2

UE/6IZKHWLDnur9dWFv2+IEw1YPQN0QVZFrfNG2eG91kevrWRRA82mo5KaCb6YCPwTVzviJZ0piUP4gZ

56PnNED2IPC9Ep/WA/a+uZE80Wx6h/SgBSL7X5hZW1
cdIdjTLSqmx2miryLH3m7H5qSBf80eE+DV5GVDbMzMLUHYkIVphSqzEd+OxPnDy+bRr9dtVIin39LMvT

01psvLv+2qgyWEv9qfXcIR8XWdpiaAdaZ0HRT5k/lKNYqSx8lzbfeTuGS295dvBfRUJXXIrKugQozP0Q

yEqDJ0O6UebolnN6A4eGnjktQSDdFdHz/RdDtCC8qv
X03audJScm0Y1ah0s45v6EHy2KFfAyQCKwinWbaakCyzmgy8OqySKAnE+n3f1NRiHe/VqmoLMkGHP/dr

NH5eqUz+O/tst6OT3POnScjzRqFg9Rr9JDsIHFZyB6T3Ctc9vSGn6OpQZJBn2GcmaoUR/RTlCLmqqEEc

nmOqsSOi/dDmevi0xcPzhJtQlqmUzW7PtQ4hpxXmMd
aamdYeXbEWbJJZrq5ouyKhnzTQw6yPxYI8HPCSkT/3dqY7+3lDqDmmRqXy8Yhw64XBgZSMPRPZ28T8Kt

FLJvAPT7BzlU2tYP/v/efQc+wDkcgxbk7bYfuY55y7QXpDcUXckhFqrABx92co0QBpQGHULylr1giOu3

NjBwdo+k4a40gyQsQnCpLC++RIpD/FxnVdG7nefpOY
LIPbg+AKv8EppJi7rbsXodRhlBcCsj0VeNUitJeTOwKeAL+9aGXwIEYBBDLjhrrsu7+Gelm+m8Ycy0FX

at0dsd90gj4byPGTlJW/Ge/9CwgERfrSq3MME9O+HsyLUr2adROO4YWq4Rem4dSwWLGc5zNsf8tRF17d

GZIXVBxxYqq1hcZi/qsG1ArTTX9ZG4068KOQjVXZDc
4LdjBVUCxbbt8tFA8kg3Hirp0Xll4aLBacWV025TjW4hY8FKOdkVvJ5VGL5xc4Z3Xf7Bcz0oKPZ0xqc8

KqgdhX+k4LrqobIuGSnxa8+gmb7rqIUGwEnpP22Kww9hyYRTtKjSbDvgGJhiY75qqHHVPpMvK93t4dqZ

khkxi8nVrBCs3OdJrDA60UBIIWy5aNwyR3UZeXqO7o
9QaBnWKAxqvkYJ/cnjHQsXv07PVPiAVfoWI3abQQP+31dAAPT4gYOSq6+tJtmb4lEsAPH7nUV2dVw4vH

JR3ih49UG8wvY6TpHqF0hqMz4zLrtPNzGv1+c5YzOeiLcwUeW2poZmpZUT0LjWhfrYoyzdRc0KuDkHU0

lnI5fJgXmYsL1lJket2XR9RXQdjVmpNKkJy4A+IBKe
TLpcTD1oNZAnrxgA9FovbV8hHnzOZ+zoWajP08EkhanaWPSjg1zs5KqFMvBvYxL9XTKHBr0HGT8Wov9A

wKKE3fhmQKFM4DFsaVeM2I8yGNzPy+/0v5qQ26B+0CqRe8YBfemQoDI/7u1ikLJnFsMd6Qv/jsK1vOiD

+Cz3k/x8ovWHNcd1SlzZpX9ee8ShRlkFc4uF6rrUQI
3eo8GX0XO5uG2zfPmiPFlO2Yr3mlsltOfRim/Upa3K4Xx4ohcbHzHMU63hsoteAx0izm5Z/Uda+yFdjc

eOqF7JHrIlDhYu1qzvFBHrPlnJ7L9vEWpqRKLfLQvUT05FO3nYLUycjMO5V+ocZ87j8ot3JbpJiXpPK6

WDyuW9e2zrPHXnJ12uAQrqKT/WVtxnu7CbXzi603tM
i4fKTKFUlvNhfhupGGX/bUegDtAU+ekkFx9q15XOnogboNawF3UJjIMyqdJiCoh1JNt2vkoQI6mluYM/

To+/cz+oZNNZFLQq+Mclr9cL28rw0ZuFGY6rEuFCfWNVextfKSfHCw0fYdId4bYRBmR24JGMpV5VnFOw

HiIbQ2b3yi6ZtjBJuILTma3TuxBRY+v9s2IGEEzduj
z53CvYEfq7mSx/R/8dMLahYV6ZEweczZ56xonZybXP/vxxhTYMxYeKwzINp4jyJsEZ88WhO48W131XwI

2sqwi5s+KGDoHLWUDgam0sZdwoPppaAuH/H8EPHCBSV6/K/ZkWWUwzmNQQwpPiffQ0ZozFZ7bzWDNu4l

VP24Z0joj0HNSvyBHQxkulrjZr1yTTGv/rS+xrEjzG
ugqaUg0egI6sVkqe2eq3Kignqo9D4O8F7+7jPAv+TmcHk0D8/3jI39oN+277NczOZbBJySZo0gEVnd7k

wjImlHtTkieLYL1lpb/DB0BV9vFmgSFBQq5H60AEgMSvNjKpJpOYEk1NTA2NephUAvZ4gYje5G3TNRZ+

Nvnr2aCx9tqwHmyGOlykKXdD3tM/3eOXKCN7MvyDiF
D/QkQbyyfCr0hP2pMijJbf+nDBDx0lMljmKyD8cxHgQ5zRJoVMc/Qhe50BnL0IRB3XcLEMcrN6O8ESt5

K8ksJSyrdQutvWyY06sq8tXVa0l7J7AwBUibKLI2g72Gdeg1Xf9jw3/FRmAdAcEw6ilR41VPIxZeG9nD

anYRdyp3jgbloaFYehn8EKbK631rmm+M8rWzeGqvEz
M/zbZMxScJIsKRAzEVQhK+UMrFM53+HCvnuxsxnKzOpMQ+jlSED7Bu8zk5RQWyjS3V6STh1iarVKYyzb

6SGCm2pldbHaEBYVTs+SALrYiRdnsrlYLqxwUZ8SzbYxJHkI+tVB84fZyJAVgV/RRfAkrl+KN5+m+Nn9

qZ6BBGhMzZ44+z0NrQdlB4CW9HoA0fkZeUWARTllWr
FyxZ4vjTyxRLtTK3ezP7Peiv0Eb98js1XyqbEPQJn5yHJRxjKStJQihOmaeCPW5+MqGH/t7dT2zBwOA3

Sbc75KGNpUv0dabbYz2PLtYX6zB/pTz0Dt0A+HcXhr/zQea/y6Ha1XErhFkbfQC4uIh37u3W75kcYsIF

xsspaTk6/0wK0Mh9zYuHPFCEy34gwoowO0mt6+81IM
Wl/4ebey1i2C6mIQZLGUFXErD80IdO/esESwCNGOulkqoxYRY/pNAmk76ZU77ktc2EpenMpgUALjWf9u

sGQO+glke8uQTAN+4O6PdiWMUHOBVivvWOiTZyCym7JN0ANufMT9dlbLWer8aZL2OhE9cRv7sooExg6Q

lvSEywRzOTU9vlUA6O99t8Vw8sj1qO0SRrKO0WNzrX
xmnEzo0YqZ7fdtBoRp/8O854u+Luoy8+PxQJHtl13LACknqUPi/7quIkiic6N8/Pysa706HFB4TC4zuS

xtyKeyylMf8QkBpQgqFiLvfKEpNJsz67Y9rJLaQ7sRQ7MfhLbI19b7VK/ro+1HnPuC20EbaSY79cRfDr

HQC3lRsfQNLUYOln7x9WXRNaGT7gcH0hpXz6kAY7K2
Jpfe5agsgxd2QBppxHuJWj5ZjYa8r1FCm/NwpQ61QFVf3BC8VYNzVQ0K8oi8kn8Md3DcQhphlX+G2onk

zv8q1ZHXjJ338KUMr5ZDAJQbGGBjRlKXe1pevwj5Aqle6l/MstUiZpDinp/qpFn8RCLh4ax9v842KbMG

dsZv4rt5FjcOXgJa8dpdSPZmV55JSHyPr5uB5g0Y69
48idHfG3IW5hlsOE4igP5NZ7niSpT/pJrH7clfMYT8QfERn32vxBp3rXx5ol3nA+5VG0fhVom2i4/DSd

Y/QwLKhO+2G3i5p1pcQrCd9QmCtNEv39R8yE4EfdIKRCYhwBniDI2AxPmaQlk/wER94CJVIJwWkhMm+M

AnBy16g947+D/e/3u903zIpORzX8ytim4IkWGYhgsX
lxovbigbStJy7IHZ/Anx/Y319GyRFYBghNdS6d743mP5LQF0bAitkb1I15q8iJQAjmpl7js0hw8FuQPJ

+gzhSIITyxwZ6/YpIGKn/Hxq2wGdcSOFUqvj8viWDP7d4x+XbyAhiFkq+C8SDE9xg7o8AMWdbpd93KXm

AaJcrmkqtUVx9CyQ7XxH7MNUOwI4RkwZS0AU4CJtFW
qI/nic2TpxaFBAYM6M2kQwxWDIWrCuBOkektvLs26tjHksOEMFOd6DC6U9mCTKxOFck20HKfclUZSCoc

czMrwuDr6be4/FnlMM9atjZjil4MBv6odBthTxh3nfig8B/la7P/YKoPkBmvjk0JE5jDwkHPbgV+395t

p4UMzB9GTEs+ZXYXc+nO09yi5+xsUWX9cRE2CpTTDC
Y7OHrRoHngvu33V3q2XJskvW/F/CB15F7/bizC9Leri/7Xf06xdaRFTfIXA2ZoKY+d+tuzk3O9la5u+n

YTECP/NRTH9bBTnsup+W9xyR1D2ILs0RBj4nYRF63aFGQBZG0v+j57CuehCzszraCs54NW7aCumF9Skn

8AihT8D6ph3zkj6xaOvF+UUH5ZK+4aYGdkvNiPfBBU
HyX9in6OMf52PePdz7iyGixEHejVG3e7BZ0+PG/ZRgUd77XMany2wkn1btPjPt4bSJ0Q+6egZyKv08hJ

2p66Z4IOUbuexOVl68DsN6EJHnBylqPZ7OjLPdR6Uq8k+h08FNsQqlBMHbyIy8WyWgvaIGqcj7wZtkJm

jnIA9oagptKlgNi7ufIyR0zGDnajFWjeqnVIkpoWO8
b6HZyCbuJ9mxOBl6VU3rG5c7bLNlsaXugG6TznZd1QYFHRQsfNKIxESLL5onza03wG5nvbTIth17KSYt

VIgeEpLmQZ8ddyogO8PiZEt6tVjuyjNMWNT7uz6Z+3Vtk+e5OiV5/sYhMH0DHZyHFq0X761sV6Q47Hfh

uALBTu581UiDXHuYvc7dnqlcnuoc99jZyfDPu76gNC
orUOX2HJSB/P1/3JF/OL+fUvUY+I93G8E+VYWZRNs0S5IojSW6CprEsHC3GpdyRiQBJXjN9tyMidZBP0

8gJpt6TWXo4mI9KJmt3TZSe0KMGqzgMu80he7cdq/rnm9wUeoD0SesI2vop/KHWyUto7yufhjekRhzNC

XQJTd/QS3pg7jUVtga4uTJb9RGfD2vsBN+YDInV2jh
x1trfi+ugCzQjKTbeQCvOO08T49yX2DMqKaJpRqOercUUudRTbtOx5iVk+pngikSS9smmZlW236Ty6eP

xJ68z+59/ApucGoRWp1UCqGg8s74Zh4ufBzAQLqxbnhL33Ar6ajk8YXbsa/shiUWTgBaipYClIIDtVyZ

qifTeCwP8xTi3Ms/ikf/Qpambci9yqarYvo8tdV4Uo
QrjRNQrCcdEe7kk9ocEs5oMozH+I/AwuPJDwcHi8x7rZb/2y+/9pQ63/F4/6Qv5DlAx0O+wCiVd/ulOb

abgj08jhsY+xZ0CFs7wJrWIwELDV7RXG+zEiK/PBScqLx6IELBTKKx6Yy+Tby/Lxg4ZYm9p7W1jPcW21

qZ7XxEl5hZv6BW/l1euLeTcxTLB6AmedaPfzxE2xug
6CSykEIfPQoae0Fj0nfZswj4y/61OLuVIwhUorEpBcjhZWO5oArcIkNeq8yXBGoXdU3iPbwrqTmgyMjD

orNRl/o45LYzPwSNoIfUUMC56edKUE9dQqx+UimkRHpqcGhr6sWp4a8GVtOIVRZLrmh0D3RsFWT2Wqwp

jTcC8M61geiEZPMBM+xSCO8nU6HapPZ01DBmF3hhM7
YEEpg9NGYQKqIFeMf70MjegSq1CwrMKTKOK4PY4ezJh9BFwqm+O5XOmi9v4h6sdgqOdkoIWNr2ErsWQh

NmqWhmeTRargSHrmCwex/rzN4My7CpX+ds7stSN8q+Vbq1+OKY5W77UJ7zpA1UAzZwKJyGqtt8ZIIPoO

zF5Bo8h35F730JSOqFxlZr2SHLW5rVpX/3eo2BMKRq
vzYIf8xV6bPsgcXQNEu5CbZi0S1wxynGs0hJuxLpco9Pzwy+bpxKZ9H5D9HLlwnaxWSo1Kpm7sWc9TNh

2t9SKv/X4+Q/c95MevMWZQlUdCnJYKYM8synI2OCISCQGBbBsHlKGROEy62PmE26alLE4dvkGB4XbN06

UtY/7bi58m7+F90ceyjlvgzfZmlGPqvk8bN4bj/FzA
PvgFbGtmR12hOkxY5q9dmIdyDXkJWCwqsES+loi8M1HiVlPqaTgSHte6Lkg/d32WRL70xIdXlFR+7XOd

lwiDY6+b5qsX9xL9CQ8DtTLgMosHik0xPN4o5EdpFYFA+1q1H4KMA6WUPqhJydaBkdT+/1Rx2Rtw1+wB

Ld/iA/bwI1FpvLYzvl7N01dbd17xEVFeGN4ZaGC3qY
fr+tkhGllLJVQhrld2RC17L1V7FC2dQM6g9sxa82vGAxDG8gKBV4YRIkAOEwZ6t5tq5h1Q0jg7DppTff

pNa+flA2+qCdNVCDYp5FiqTa8yNyriWP1lWfSBoGVUoAzv4wb0koWgyRnmK6N71atRGv53eAbrcHruRo

xTVwjM1uzQm2+t+ZOhb6Q06ZMSCa6nSKIZ5K1aoA3j
gkTWfoRzPA/PTSrivnoP+At05AW+YLJB+HPaNZdH+1XV3IetJuynpwSd7XbPpNUg7/4cwtny7IDLsf5M

f2yaVdvQe/N3yIO7AX/XTPDtGIrDylVL5NVpAJU+5kBZDHppfNh932YyWFnd3SNVrzf5KQTlLxPsfU+x

kK1FhrU1Ad/wEybYNiUI+Y23DKx9k5cmrmhRDBLfph
00eEdSj/KxrutbXz8xqt/ky32NMorblenTAS1K0Tx1zMg3ZArW/tbLiyp7TcRAeSOcbHkT1mJhamCD5+

Pp7AD+HpzHQHSrV+5xL3aXssLUITHR3fIYKbYfZhnqaP063v9yeAgHim/ucl9r7HkGIhV8J9x1DzA8RG

Zq2l4LitIeBwg3L8FdQhnR4LkveQSY1StSWu21ej+z
UNjaDFkq8dXzR9JfLT0l7nbvwrfkXDEMY25YpI187byL2Fye1SlF3apdgNjUQ9jDNAIoAriHvrsl0K2z

zingQnysNHm0JlHiBCVHtNB2rqCTr8yid1OGuW85AnnAXtccY40MK3UVRx3vj9yteN7tLvBBFDRwX+Hq

+dsJxo0xsi7clUBpq1QnMt+3xTAd67EpzaElDUFMJ8
xmfj1YNeG8V5ZEFawHEaNUNUXxuj7Ufo+f0kNkyxHl98L6XWXkbTT/rqrbEXOiV2Y45McmXDJ+U+Lw2b

Ti4HHaRYXw+TsNB7rGSv5+ExK9Lgd3LVh5IgXGGJgTWk59WsC68g6n8IELYW4YFuvKQ4ILni8eh+9E1f

b425L/ZRd+YkfwxPdnfMiflytdzbaiGrqob8hxoIZg
2IjFPfGm5Kr3PnKw3ebcWx0PBx+wOJsjkw3NTWkzcmqbf3tReuWi/E9sgbPLCWR/653Y2GYNpPTSSK6K

vRl6MNRFp0aWw4IqJoMYRcWPRc6RLtE/1Ki/J4MIvGVFOkcHe0TMHZvL/MALnS3/M6U01a68rWqu+qtY

IZHFIyXxt1jAW6q2HNhyJd2Lan/z2JkZzKBjy0ruZF
+7T/oR77qLNr5uTTqR0BgBx9w5DGOo7pS0+7OuZ9z8nuX8O8MbSM/gNxg4SeYQhQGuKP4N4d0kUb6RDy

+8qiHCG+xVLtqMaNlu/wEDg7e72nXLeN0EiCq+Ql343opuOFTEmjBmY6/VegzkH5WTxbUcRosbPEM5Q/

cd5+wr9GsAMX0RS1ts3To81ZZ60vYuRduiDEhshuoB
S3Qo9PJ1W0EzXzzd3amXajAaQsJIQy218mHlkFR6Y/TVSkM7CaIvnTFuqrT1O5LVGNly/LJNK0ps0KfK

eZ4GXjMRnpVpl9jzIQppy6F/my9LmEuz5djZj0wXs7Es4YWYI8tvCIsQqKwY2HakBA5LYnv4/lG81fsh

UtEIgkYIsgo2r8z8HBC/4mOcGXj769oChDwn0uNtsS
N/dW6Q88G+cYJ//LvDWALbh8LjWKZVRAbVdzcds0SmuLDC7AMHRh7JqBazck1eJaIrSkydu0yG0EL0O+

850EU1kgR66Ms3UCsr65twj8kZ47d7V62Xpu16V93pFf0VDWXSECPtbwD/j7RXOeqzMlBfNSn6r/8aoH

4y/jLsJBkaA8TvmAeOyIwgVR57B5fG3gV6CiGK1V21
rNTl73QJccClohhLlH/yLvY91fpDh3L0jC92PNirJzKGEn5MqkdjyZbqYgp22/+uqJag+o5TUOqAWW29

59aL4lgUhHcA6TxbEUJaaLkZ9SScs9F7aCLRvP8863vzOhDUQr99xy8oXW3vCPf0/EElPOMOMeu+Ep2p

R0K9o2tNvQZOfldB3qSVU1oy7m8PB8wuNXVNats3wZ
0g2AdwaJZF6T74yko+Y7r+4E2DFjJ+uhx6wOmeAhv9DAuifvc9qmrh3K3ZNxWC+U8RjoI5BqVAXEoLV/

6cWfnrdTh46/czyBkUXH8bHNs4of73XKfgXnyFBEzQshl03pV1YdMfuR6wFKbize2fhySauumRaCD9u5

ZtXTEjOBGfEfNnuzqNjdQFQTPh/aScJ2M+x+B9Onei
hxNloe7SC+FS3w1Jb1DJ3OWKos9nhnKztASNP1W90QIsvNGqYShwcSv6KUTD6HtIWs/Hrq502avSWLiV

049gr4FNWy/fDvF45TuVDEWg73YZ0sLIwrBiIJG3voAQBP/d6jzrvDFRysz7iazMzMg6Ceeit3WMPc51

Alc2DFAo3LQL/qtahh5Hjkk1Id3gECXff1jMf4qq8D
86XPNMfnx5W30SplwcZSAw/bgpQw/VzgYpJ7g8AKugTY1l06sYMBJNZnrIO02QoOAZWUEHIPxujyDTE1

z4KAb0e2PQmyqZR7t9TwxzRCG+ZlNm246iaNy+/w7MbCU17vIfWsNmfz79rvOdsqBu8Tm/5lV28Grq4p

QRdhUZUEApRZuuM3zMH2qf6tcKHkg1kkGl86ZgunSB
n8LAgsd1E8Z/ENmcvBALVwnSmzgXyMjJ15+BmMHshuY8wjT0OPJuGZbXMmEDkZftR9sdCz4GkKizfXfj

v6U5JfNmjyRO+fwpN0yATGcgDBJcJcrkr1ue6m42twFpIez4AX4mAqeYKbyIrD7U5IZ6fI7bhAnCjKoK

n9BVwsure3wSESLTxSP4afDAiTdgZT+UraL1CocZNo
zZwLodr23qABUUKrChvpiYsitSasqEzOv4GECjUqCFOO40ENIEsKKdk5k5vc/qqtGv9R1sc941bHwURd

/HnCLP8q4HD8jdn6z+ZlDyncLTwn/Pi6QEi60RiLwaJ0fGTAmRIiomqvg7blqbf0M3+CrGot9LH7hzRV

JrQErEqgBmq/Ybovrm27J1fVwv8ybR5JHYQe6pfWk8
2hFbVVWbkwgPBl8teExgs+dV+cvfAoBGQ1TBNs2rFXXWH8b+bjjpUH56/uL9pFlSQt1eeKkAgx+RPyHu

D5ORnfFQEya+u46a33t8yk0IpgwI8ZNIZ7PJGBBcX6BR5UpPc0pP2yT7Y5kJJhl7IdDpJYY6oTBoKUcA

/ybRr3e+BjXzIKw5Tp88ojcYy1pynvuLP/NzP1P/15
fVIpcApmn1C51p12+kMYzs5uLqIxnS0NgU2UYSzB/zlG/TI5vHjtt/Tj34rZUSGjFVqJbIL5zX5OOHJA

uwYgCk2mlOn78Ty3TrmnJ654xCG/6ZF+JM4dO4uSeC+41+M0VH0mZGQ2cMIMU2v8bTdMSrtAFhudsA1Y

DE34hPJnvZ+ozucpE+uYqrqI0UK7Txo3lJj5kA5jB6
UMy5mYyqVk1ptsMjbsLk7DLULVLn5jDk18kxjwtPZCc7Sr472Gyjm1Xi/o9GTks1Fr0j9Z63dX/UQDbB

CwJmyLrztxThkc37J9dSKzx2P8rpTziz5V+1kXKy7ZpD27al5B580k9UjrYVNvOw0n+x+J04VqtVVrR5

Ay5qtrFTSpVfQVCdyJJ/fqkltc9FEIi4jKbIjLV1zd
VDA7Q1wq61WgHZAkhSc55wLOVz8zPdCt8On+d86IumspgQS1qCr0AMew65eYe32h39KeLrN8c28k5N2Q

9E7nMs7u2e4Jtt2ujH1hna7aucG2caLn7nLexXWjqENRDGg46QQTXOivJrgSTImfcn5nqvExwLaOSkOU

BCBiH9RovXr/JFGjTAhzylguUTBWCRqpMA66hx31Bf
uEmkazrEOj06BFPCd6j9BMKVmgUF9yMd1MjNdHiSZUwdt1I2zNmxKatuLtqgm3kqauAUICsQH3sK2tOE

WVdNmsn3rZHikZOQtcyV+FXsNz41UvC7gh/WvKmQ2VFnC6WKX4lm6863BuyJoo1JOPwQ4pbWvfS/cHUb

YD/czr3kr3oGsjUs9+PirGRXyJHTLwGJVcg6THl0tm
lHRxlBtYeosBWzdDpisk9bUUFtVdEScyhpKg4qM6eyhj8025M1vqvv1e2Z6wy4ZSGPGN8j1cZnK9eK7J

9l9YM9Zq4QOWUoQWJFcr5t/cK2FaGHlJfb5PlbiSGiNAhx0NGAqW3BfO0bcocyygIOFVgOIj5TFefzj/

HfG9Yc4oqxu0ohA5IiLMz7uvGMrsVFMCfzb3zkQLwX
Yn+XWP5y5cjr6PvNsY+npQ7H4TBoVeRw3iJVlBM+vAbIVClJ/nPluYQNNBzNcXDWxDKbrTQVGga6HNNx

mPGRVin+eg5Jeqovz3ojkDFcbfqF7AMMt2H78FqxiH/+JEfzDqw8kElI2Yb1b0KoOOThkcw/PKBIUqVW

9NEOXqBbM15xTs4gWjGPIbphkP3iRwc+AIO6p4Np9t
O+elayxsQJjPk/aslJY7Os51s1hGlyGTo0Z9gzMs21/iCd0NmUQLAPd+JE2xkZ6WsubQD+/3Uapd8m3z

dqDOs+2FvdBPcpxLUwBV2+HDUS/3MoG8IYZviyNgYyS/y335v6j/OMNRqw5tEdLfp69Q7/P4nYQ580y4

UEBsK6tq6PMN/XUEz7ASk06BoEFIfx9FVXw6DhSP+U
XP6Ve949hcekCkTQZ+4cbbst0lAgoU7A2ES2prCdHo4WQHlgvn+bzSrmJMwkVJo1DyAAP9WiOWBEfOHF

dZCoacODH/f/CdYXuFYum4w3l7pcLXtXjEW4C6AWBCUFrT3qf7HXaLYcoo/f7LjwRyOp13YZyUhKyw+i

aS6M1Fl9EQiAb2HN40XAbcweeLdtyVBVSUKz+ypD9P
Z/A1GLmP2BdvwOeXasbTmgISlytZm9y9hoZZrkwRoltFgIlIUoi6zYiKYbqtMOsP8F5jiRcnNZiyufR2

Lfatt3eA+5f/al3pOV1QYO/e6mi78AaBSowvlHavyeMz8z1QwtFPVhnbOYimfDls2DCA2Qvz9NrQdxuV

sFvojjyLcV9yf/8LRT72KBf4w7RAqiS+mzhG0JjjsU
64RVherJu2aJP86uwB+NIeFKJwpqM7QRtQt+AS7uHvvXU5dkXDHTqcJXEw4fbQlpBCspDcOz+D3scmRI

yJMX4FFJ/lFOen/P6TZOhF4/bzxlVTZEW/1E970jJ9cc7o3nvPuKGzXih6YSx3oO+rczUqZqPXoSsPUF

I7OSuvCRHws+R7mYCxAwMSE6Gaht6/AywtuVLzxr4d
0az4qEjXyq3++htMZTtVrU+OWRDzOQz+XMMb5mmlR1wgTcIBKirtLtDjddRQ3pqzOoQgc2oXMu+RRYvj

ciCs9yCiq4LMJ9e3ajk7+wNSfJCVlP1YcWV8hVKoPNCw1W6LJiKe48DeGmRfsFhXk4tqvgyiLd17MIFY

5iLVCn24THlxlaCYFDL9d82n+clwtt9l/AuRROOaMC
6iKTAW8i/cRzkajaO7szBAqEpmwvtRo0d7SOjyOCpNc7/7SlOIuuJ1U7sByqS5tvNanhOgWeVJahVhhJ

Hrr3nlV1OSes/eH/sXA6W25mJLVuk71yyfitKw009UTVovLroeDA7H+CKk6hQqbI6KYn5TKX+ARemOh0

elvbK7DeMw33vw50tLXESxeHBhu5ZBJISL5DaWrynl
aBCEuDsy5cvNA+zDuKAIvi6w4lPVNOtlWhHfm+yTYKj4QBXAWUfjNBltFo7q1wzAOVy/+xtMtOiv/2WR

My8jk7VeY5iAcgF3aNSpX8aj0QEe5hQUiqzd9FnRiWbCj3GBXlAV6k2jT8PIvAj2D4vYSAARvtgsLBwl

MdwpBUCfCkU6d+8vVE4KpRBztqmncwC2Erarbc2in3
hKNzHHNfI3a4bKPipmNYktx6S1h3V0f5iKZSB3oD3tUN/GLeguZMbYLDncWQczv+iwDNa5VH3G4JlLgb

jmNAkAbMtQfuuZvhwvImypN+zFxZrMEMDn2mg9z66IpUa2Yuh68UbqIYjbXD0UncTt8rKwx1L4vhZmCG

Rz3hqmhS9VBweOvlVwy+tR6lntEGYK3oldZ+2L6xKY
iVuqX/L4cz7BwrBZ6R3FzR+RdZqs3Cq/xs1doQpnpP6fmz+35uHvO7dbNNQvZWz4Uw73ldPg7MKlEJk+

TpWdgsYAk0s88CCeniCJ/ZxKrDFWPwm0iJERl/iuTggfj76xJvwU23M1ZAsWnAK1XRLya6McM9a8zTI8

jmsRJS7YAtVMFLz8Vzf+yt9L5xwKvgL7gFLgeUT289
/GH+fA5HykHp3EpF9k+ZfbGLTHbgVY0pv8R47HZS8P8ykOGhJL36RtVsR99fau53lk3ecegz8kCbRfgl

KchdBhEIeH6eNJdXjFb73x/UVDAtdBI/V+TseQO4aXPyQD10561Oz57hsPGhL/L2bYcD8xe00Uyq6zeo

A0KN9pHdpEq+LMcsyInt+T1t2//c9dSe27Xv5ucV+P
rnjfuK+9sMaeGCcUepGMh1YpWEKMFkOE4W/9f2h9T2RBr1xAwuRGdeWF9ffIVMtOhiUZJBpxjXu2a6B8

pweLbplZBCJO8IBQgenuR+2VPsRpo1vLs7ZjwxigNO8w7odyxSazI6r0nXBJh8L6CD/uZLviFO3zEPD4

qz24unGAUiH4GdQwaAxZqybHVgkcO3orciBwZl7Y7j
5jQFrrvQpOhFkGQygNMEySG50jJk0Jv7oLVc+NuOdDHujp0m20/aJmVVzH7VHZzeqW3HmezwHO8ObOP/

3VjVurLKna7g3OhjCw2RAfU3sNbf76/fLPogCUq/bIqT0dfYx/7WeLU8vc9YRVDYASsChu1AVQEBvL3+

Gf+lr//cyVb3GtQZ+arsPbivDXtdk/xIu1zihERgLs
olSlmx67lxoYMzloEu9qpCnfTBzLHKQZmuwhQUJiYspv4e6lyHIFFe7oSkUQ2pVWOQsT/JBuX2IoIIH7

mJaeduRpqrwcftdtOsTh1IcgGKLqR9O/wgRhcPy3RCSnxcEK2cwiqnKHfB1cxRKaZ0836h7ebtg5QN21

C38+Jeaex7jguApfJH1Qk91hjnmA21Ly2g1y0GK2oj
hcyIsur6JgzFHPu7HRrdgIxhnhsnqlRMhVxuvbLOhzqFjk37mJdw2fJLC9u2EFTcUmmyAtCea2j5cQ8S

RLC5UUAQywXcR/nozPdzrX0SAYt8qmfRy2WNwh9e7BsVh/xAbOJnmYAT/Xaq//S1pObm14ehYfaz6Z4p

sEYw6Mcsf42nhpwNODhSy2Kn8kVKafpGkekgzV9jm+
k4DAOMEzUGlpTydfgTmo3ytNsspoP0UTSjJnMpKs61ImmH/6NFc0r6hSrl9KIN6MHX20jmS8GJD6PnEK

QvK59n1B9XaUepHiBEQCfAycqPztnfHt1L1Em9R2izMrXrLn6VcauZoSR2cUuwugjuW9MjqOziEWxjXY

68ItTZm+xsTrtWg2Za9POCJDUBpj3/10xrpAn7FYMW
bsmZEHzwLB+hHavCLkBUiDiXBGVDUM1aeDZ/dItPn9WoTJkfRDFJ6/rm/gjk9eLqJExzYrJ6s+E+Zo3w

quFJ2xTUL5IPd3ILzTcgA8HWw26iDguqSXk3zdh0mOTDgeMZqXnh32qPh27f784EIM+IgBEDV84jQ6C9

hkdlMjMuk9xHtueFD33nlz/cJts+rzGBn/eBd65qqw
3zZ/HE2Luvmn1YCte9op3zfXkf5JJ+P1PS3LqSGyf4dxQBv2rg2ooAPnn+LG219oS22SUNfpqXcLSHiO

J7kKsSD3Uel5xyOF0EpUh2v8rRcxHQj9e0Squ5QKgZCDK65cRNPtYudhaGehuD7VslrFehFLixhSDBw0

zQJiwiqJD7c+CAoZzdS4uiKHSIBITg1Tjx5E4476dt
wrZ6Z2EzQaldYTJo6YxCwsyyUmjf28zP9U2gSeCSX2RsJDktIuOhuE219NU+ch375zjUPEfgFwCBpx9i

gr32L/ffAvL0KST0lxdschpXJczcEexG3Z6pmThjXrNrD4pDV6hiT1nvFS1/LWO3IIDppAscxg8uS/Pk

Ca95khTndjBMFXivZKZpnKg+lRv/6sxk7FaJvT/woH
aIF91hy/iYGWwoti7UPmgWnb0qDSzucaLDnrhuQXDL5DXgVfmWsJn8kdupjRjUWIx8BfZ62sh43ocxV7

UreIPzU4pOPVOjK/ZYvPQlZ4Iea5v6Th3YhlZJCRWL7dcJcV2lb4ZuudHmBi/npyZUdkBfoeLe6GBPCo

HfoFiM46KnfHpxczvFijI2OkI8/NNLqm8rTk9I7b3r
XCNCeQlAlNrfZNUtlTdIRyhxkClLPng7+2l1+njKruJrRYf0qI81+X8K52e1cpNmBp1xctNPBe+mnauH

GYoBXYPdsGsVcmX1XdtqD2B1oqsahRbiK4MpbSmfndl/vOVB3fPCh8UsgEgzemCBjurSEaKUNKJS+5Mx

gyMomOdNRnjthAvm4IZcwoVFnrxcYG33B+yGcF3a19
GgDZfs7G7/cOmosAKlgZEPugV1Hr5kQx6jos1tMtE2PLr5B0/Sm37+lK4wkkQQL6dQwBnlBdcRhlvM69

h/J0Dox6ieSqiW2iYWMLR32doWASSXx1iNAHsRBBVpF6Wy/aDNpS4MUIUKp6XDu3cOjih06a0bdnyGuO

rY3DibyEoh368ApAJ8coogfg1ItEMdXhzF/9WlirGq
0A6yIsxO4+C8Bkx/NU9X8DW5SesD19v9/6t3P3sKV5e8AwXLtJAn9hfPd/UzNuDgySWmo8tdeDFGMl3E

PzaQtm/GzsfITpl1qopw4cACoocHwiPtU05xHgnC+nB/olA0ViVvmruZtFeeQj6ZiQnNtdY/vCPcip5e

VepU4Efkp5tt9fZXrjpsfyg50Q+I/2KTsxchf/JI8/
A8/HV3F0sZBDdgI8qzdV+iL/pQaPHkpfWHTXTX61ZP4j7hrCd7PM0G6u82e9GPntO6SgWMJlsXJ4d/da

kus0EWOHsEqIrL9dZ7RLDTTVRvMk3EIvxu7ZS6P4A+lnJrE9cF6bstnD8vLsuUzOfjjGVHUMpsN6gnS1

xO30i1oyfvHjyqZnqpFBkOtr8jCgQVcHQAt6qfFpaI
IxeoNFWwexrw89PxoK2qdw39aFXj4MXikUg8IPBZpa3OsplEp/XuFlbZsob5NryDGKPc4WwkW0ym4wal

phcShDInuOFJ1XHDLawhkXwKDod4hBq2j1Dqz1hp9og755uczbpCqK+kEPd2rb+Si2YCojms2ET938Tj

0m426cJ+xRcGmmdGjbV6BHmwTwqbgaNbjiplS6XRz/
99iVUe0w/gMJ4R8WlF2pNAnedS7Zl10X165neVAeo9fC2tyQ4RFDQ1THBFi52n7ZLoqlPbqOWl+pddAT

wGQDZo6z8fj69UFHJPwu2qZh9mJW5V8D1deSIVMZmbgWgZgmbHMhAaG9wuaXiT++PUOd+XxL55bafKDO

uJDAeBV7BJVm5Ayv7Cp14mTvAzUepnHMGeSaBMXaFt
okvrTFO/VZtzPDhp4Fz6WgDd4flqEiou8A4G/rVyLuFnlvvvlEtADWnlA4v1S3ixAI5leYYX2rn2aaXk

hC9RdWXnxImLuuXiNwDG5xqCXIjmd/fkJLyrPe2Djky52sd0qKgY8WU9rt853laN5+j7+qQ62eMRarh9

WqXZ8eq8JIFMY9NryWxXfqvu53Uy498VSYYF69Es3U
MJtBgcM5s8R5GufqiGGZfeV9V6G3t9kPLL3jhcoBLMuaN1azI0/arrvyxj6J4D0UMYjMbrRzaKQXKwrk

9RZN4dx+7Oe2W1Yxciln+MbSTI10PMURwFkZWjjWkVk/Tfhu/15umCgfX9HvIa/Ii+vpA9MfRoyMh7SJ

KQKT8rsGUpqAkd1F328/+Wzafl4MT4S4DVj+cwdIWK
vgwU1mgxuCCaJ5FkTMChFEOvwQe5xw56YVYgjT8fpPAyFkA65BZ8RC91rA+dmwWfhV9qnEtZ59kYFExJ

JcyDqtVINAOWWCAa15LhJJ4Sbew53YflNduN7nrR+SFl1Uz01hJvrIBLNf9aalvXdcQpd5AhLN8Bvwxq

ghxmYVnIdvCpUBunxH0GR7cFhfkXkD0dEyZ2Q0hjuj
Z5nFEEatIe0swtKqZ1A0ktrbQFZAQabWmOgsrgf9dMABC3uZZDafX5UeU9whbqyZMyoz0KIdFGNI84tV

ZvUOarLhu4HbM0cxPMA7swyO9hOi4TvCjkAeMTuH7sEquvgRPM3HFpkEdmNmKT5dYDt7DTZp/mhKmE5B

Rh0N1vaCbF5PwBUxmXmMLqsxz+13FZATbtPn9JDjeI
SfYtn/fQ0TVWZ+GZAcQWJVEoERM0rNJXjZ4m6waR5kYqHzWzurpTBj8akJOydpYoMzIarA3S1XGHrouS

1F7j8dPEROJxhun7NcI85j+GNlg1HcPhrmbSyCAqr4Giny/rGKMkZK92enRfznKDKNNf1MdmxSeUulG3

vHGo5PlOyckAIsvVXtv9er6FCj8fx4fixTuijNdLe+
jPYf6RrPNVS/iN+gvmp8/RLN9/j3ZlN52EnrI7Tjk9t8VEDKep14Xax3Waw4YrxiUnfjHDYxKwzfvqvu

/kM2ug0S15V9NWt1wA9mn3LrpvUn/ZSCs/wwD5WG5qb7n2uE1Aoh8YjeC2SbNheS36Xol55tzYCX8hxh

uh3x0qSuO7GaYloCEQgPAZOl3K/bf0Ae6JUu8ClLSB
zOlDHyGp3mpNuCFN/0z5ItjoDh+3xc3tDAgw4b7OX5TO2qNgQUXHV02AcH8M519R0oJAR9HlBuSQfKjc

IHMbYFJ+2+zv8uHf+UlI8eGxRL+W2rm5tJRinNC7OxEn0mXIPc46E3PeUHywZs/4YUAZdeR3zEaNQ/iW

j+gf3pBkNUeWyHHZf5ai16OcWWu7EvQvFORHJx+JBU
4tcotl9wmPj42rAUR+GMdwbOzEAHPD3ddShwvKmkEIax0dV6l3iVCvdmRHnIAjB6L7/IpRGprVaPZWEO

lG7dB+xZsB8bayd5f2dI77XVPPO6G4Mi4eC9ur179F+xgZIbhZ5evys004mqRbL2x4CYCAuQ65Ok9YZ/

fvyJyi7h7eDv419dVL7XPUE2sg9i6l4IIn6J1JsJqE
6Ps2TLM7GoZ+O3ALXfmxlUOMNkt8orY0hDlAXXw1YLeBc/JLP2dhuzPsHA/90dqluq50KvyvpVC7I9QP

NQqcueY6uBRoFOthKGdMhKr7j3tuOVXJYFus+MYOT37cQDoP3Nqo7VP+LP7Sss2czn8zTt0jYZK2Uumn

2KabGoTbPvOipMvyNkYBsEcAC63D6hhnDGVos05vf9
+6txrZRrmIYpq4fTVXxIzoVhVvzaece4o/hAtnb2xvEscJKUaT2v8Ci86lvz2bfb1g8fF8CP6l4Z7p3B

iLcUEQ+qAgW+SbqaRx5aL2PG0jkFRyPh5iA8cvzryRHnt34gqD79csGXiX1j6KgEvupMeamMMCD7c/QM

l5QZAH/RczT8vMIbE0gDJbllMX+6vQnW4lA+gtqEKF
D+E3o/tNnmEdiM8XIN5HueCF35xWryYK5zWG8mmj6zOaR0j2JM//uzQQJa3djRrbettmukEWb+MoMPIB

BJfU3HwBTgiKA1qWChi0iHVqXMhG9WeOVQHVwPq0BPp6T5K3SjV47057uUVxImRBWyp/b0a0AqdXPjl0

XnHy+5dqHNeTPGFHAlUwb9ANAoGTzh1g+Oqk6ydTqS
Dp/jyhhMeoXFk8zkI+QwM0sFnYnbk4ybJ9yHMHgMAKLdK9r45PuBX4KNbapdWes5os1p8pZR5Ra0HZVK

/ROaB9S3y6nwMYgdDFeydsPxOkUnBOy7Z9pg5qgV7tAYncU+FQXn0ioSOUKisMpu4vgvVKhWiCZiIes8

wOcuLylQeT9dQlD+2CHgHaB3yX3DeVunFj6Wgz1cag
0UFM+a/Mx0218H050NOQVogRIwVklK+U9M0m8LJGLfZ+oklDKhsszWJkPGxjcz6UX61goZ+wrRBo48Fq

Y0Ki2cr2otOBNw/Nypd0S8OnFdBnn8Hl07wJ72jOOXYxatXgKVN9j70aC2DQmW2Plf6aiNugry1OSSa5

FFCMoLT74m86potMxfSasBy4zfoAxkMfux2imwbQ2J
BqRh47qztZ3wXopWYbz2SOWjm8yqZx/nA1XIdPs4dg8WMtTObIAqWEfuyCf7fKlNXdA4ZBw0LqigcLKK

H8Y6EIaF8xCPOqqUkMMEnASKMerljNn1JyzVhmI6s19WQxRA6JkzZE1RO3SAaevx8SDXU8DGeu3t90Fk

y8+ec8u8tsDE9B2R9ec7c3uVji7VkHsyf7mbj/M4Y1
r5UFqZyLn10znDG00wMY+nlx/0P88vLZEYbg3WZSWeZBedEVrEX8z/QPoZbgsGkHpEbndLsLETkrcGKT

tv8UJZbERTWvjhyyY6OXGTR3QYRIdBx/yLbBvoaWxNMtR79p73ev/aQZhnoPiOKA0iQC031YCTsELWDt

QdG4/uYCM5QZZeUQk4HBjsh1jFQL+OOE+j4E4h4y8d
2Sjdh6uDN7ge48oiOWoDWxzW38w3k0aXEd1LVRcNzkH8lvzOOBJL4ox//AQOZ6cBvCMzDLPIlXlN3faW

+4zo9j8jcehXnfdnyEeYg5Ag71Fx7pqsMKsckkpL974UdeIUamL/7H1x6PwOJ4Gmxb+1qY3BPgjw2EUW

6q8v4M1jji5mt/6I8v8SDYtEysnZ/dzm/uJMwFjWeX
BMsu0BaT41Jj3zby15MeKY52ulazOy+BY8/tXrodXtKhHZszKdgMW4poAg2+OxE0gQ7V7NOZQcct/xhs

2usg10V3uNXDx6tgHrRAQVekpqiBrhLmPG+ZxDZAERCbHw7kW8XaEYG8NJoMPmO+cGYSWutyzEXmcul4

dqZ4x64reUAYD2DrZlypR5MyX7yPTlNLlTXaPPhdK8
DxaancHp/h3RXr9U70BrDjCOjkh1h71s7min+czXT2sqnvB5GfUqB0VytZsIBTo++W4JUIgDowIpfSeW

DpKZVugoOXyLyuTsPvLGsxs7Ptu7TF14Yt6Aq9aHv8BowvFQYUQ3XQSJBwSoYSRVYnKxnZVtfURN8Mr8

Yda2JsZXboVXxZDIiWnuUBDRqQJL6xnBl0EgNCIuV9
VwtqOizenMO/We8P+F8Q0qow2EBk/BKrw46b5AXPFgw2e4MTnDiChfwmK5UbQM73M6wL+gLZeoVOrTFe

Wywlaa1evtv5h7LmOhZ6W6iqRaPbQcX4avGzqwCFcFtGSX2lCnH/rIDpLcO/paI4PCyTMVNtrLnHIHAj

Ptom+JL/QFb3M65Z9pCZQ1dqOmYgzrZ6Ev0aghfpnm
Ryi0jDeavyG+T2P9OubR1rqyfxD6kogUgfVL55cYxe0CyCafv2/G4B/oSpla9NoKWTeHbCm3gq5lwSbK

CpBWmJltUqx6iSP35zbPmL5/FWHBiqz6ilihs3mOyxxGRlTqVDVDFRGyptFBDhUhxkl2asEBEjYRoPW8

vLBvFc2T6Y9XBPLCmnGeIQ7tmpcMtVBmFb6bfmNj6S
Yq0KfWVBBN+5Q2rf2wBFL8zyg5PJZ/5VBJpO67UKSc8dBxONk3P+ta9uVHFKQavUeGKI/8eaj91jqJjB

DjP/86Y3+zdBvcX3g9Su+2Z/C1OnEPXHqYmSftxZz+OF3ARkM9EkJu2wxdJUMqEZf0XR3y3L83QeMGyL

GtWpXFKb/ibe9Zr4fwE0QuS37gyL5xiJdNMNzolKW/
U0iDlgj44bs1xlpB8OUj+6NxmW1FZeyqClXx8wjtZte/YCfAbXYs6nysJSPbpv6rJdK0D1mkug49OVYs

d9XYkaCGmgSkY9MA+xTuRbyVk06qhfm6j26PxDJqCAvCPS9DkV8ZArPjLRIea167eVapnmSU01L5AGZW

t5SgnYFpxdbe1pxk2jfwuW1mFHK5Dcfk+EjAgpEG5y
zOjDDo8suo6E8AqeKyI6qc+Ch8SlhsxbK6QbV2uZDhnR+E+QWAtwZ4RPKWPyewiYSqfueYRmxTimZ1ko

MDIUqDEIjPb2a8HDjgZycXFVCizKnqFuWSPFHWUGy+Us7gYrrF6kXj9sinH692BZgR4C69biHQUTFaCw

zALepcA1oKvOdD7xzfiGzLr9TnfSe0pq5ATifZcBJl
uqIG3U/M91hxasqOXkD/VAKSts5MUL3BZKdfUBRrIl/9v/za3DSpZE6LXtXLHrfMHcrsZVJ3j24eUJcK

OMXWfy8Wea4231i+7c8wkkF1JZa0axy3k+IjF5UFgNs+z+hQEzaxjNxyB6qvFQnXGjdgM1zyvAb27Z5J

XQnYAgu94qDlXT8njsQ3OKVwaQdFajdcSqbSXgTSEv
dUQorBZE4xGoUW/R7ZC4ojJDUpvmkn7RO/p9XwASMxTB7SO+MJeY+LceeKYTe3JJfxAY+QWpRTfQPOlR

1GKzVFal2nZaAqmPzQIYaOBCzh1KuhQ1pcKZ2iVpBtDoStObQq00xPYcj1yIvMy1juaNzAbf6kNN/Mh3

iHoGPYj9fcqrR3Vns8UVmP6AWkQLF+p+YBFsKxnqhn
YkdKHfwTi/BovLdpFQKuaV7T8kEXupqz31YlYRAUsv9RtPnErGAjXdpVmrq8m0DsrNBhZWVKRAQOjwgK

1VZ9cFnTxueKsuPBNCp+8g3rGsRKLU/ULtgnKbEjUYxkVsrWAC89QETcBddhofwjVgN8nevOnO5vKrJK

ZJ/oCpjtwYc+4T0Vawcdqc4hnS9PZDAHoVmyjv9fiz
T3OchHgowyR0iTAmyG43T+3HYXIeEJ+/XGhMCsPsff1COvfALMMYMTdxRTovtMh/MOAZW9ORxMuIRCMB

zp2JtPuzOP5actjcJXbszc+FDW4Ecj+UW8O9rA+uJ6hOYC0cpTqdbQ2hJiuKN9Ls7T+p5MzKSoIL24lu

VzKi1EHY4ilyKA6NlxuX1Hphar57ZypR3Ar+Oog+B+
LfaOn/4l2m+07z0fP2tnrSa8Sk3QWoNJPgciYTkOlu9s7hqbERqCTBl3o1Z0Ab39x6mCmwEVSc+njI6+

SdmwIzBKgzH5/qh3GzloKdbDPupBPrC9tiBDrRE5laWdIF/KaoRVKunkR8ODW4oFefXL+JX8wqU/wWJE

Vj8Xa3n/6HhYkNalc7fRCO/Db+9m7y+scwV09VuoAa
lE4zcyLWLA7OVMFpMYBlIKcKluO7UGbqkAjTu0V8sjCk6C5G2EYP3NinW335A6yqpt3vwjny/zdo9fD6

NUoKM3aU9dxPzkHZhG5bmSU3EkoRinbrsI+uF+VsIZF6l7NcWrrTJHh7guaiKX+6CXlmDHlijNBIQYOA

SQhT2F9uhW02/qTEYbKKNIo5MpcKze5LKPV9qOXqH1
0DvCxpIQ4ce0X4HjNOXU4FGyFjVGoBXDhDS9m5Zu7lZrnbIUwe+Jxp6vfo0TwW0XrXRaVZu+fONypXlN

kSFx4vhcbnfnNJoF8pybt0us8qghfk52Knia6z8vrT4Z7KUFZ0uZLtjEF/W6AnnWPCI9CeQ+YDUP8vmD

LE6tFFWfgvP70qXLzVCT+ltvWWkt+X/Q+cQ/8LnHXi
gl8Zn8+Cu0IaDbsqtvIh365Su9Vn25tNe2nTAphp/SDVMh1ccM2mdlRXDdH+v8EXoY8ydZmLVNvl8U9X

SwaatXRyeMS6zuAMxzr2CqzZRcfl7aElAMBZgIa7+o00dvgxq6tAb3U/9qTassJkjUMuDMOFVQGLLaso

u6zRKRjmHELVqI37/7G2AS0fT8gQZ3tGaYxgYZc7o7
561baXGgoFiTAa4ER9F5NGF+mZujAuEcLG7iMs0wPVdhyfm54CDtK2IMbfF2RfuoMvCRNZn31A0I5R5X

c7VIh4g0j1xlopronGB4D88YjJQ2OkIcEFqwRYwRVWlkU9TuqX7nVptXRElsnMTdXzl9PW4dSLJT9wcB

zu1PuwhFGxs0oNb1KuLPmOL/GNfRD7L9gdx/2l8hWu
YraeBrzgh3jiZu8OSZU9TluP3qtcsBAgjbMEnncN2ROZTmodBs5/RvicNg6ouK9huco+6QlAP0Ss+SOP

pTbHj5EeylfgLIsOUP7lAgPUt8dmzETZdj72XHd7+ckdtgIdKNAvFRHGBTNce/Nr3OWsu0qo5s4Jlbo6

Zbkh1pS8OWiTwZC6vXdJrwX6/18gWgtXelTgy3fMn1
3ufabxo+NHgDpn6ShHwkFzHoVy3B59tH2woZ+4CMSZkWv+ZuI4xzjzH68b+fphrGGapy0k/I94hg0uHH

Cx4kAr2DGvWGEd/8qPl9WMlbYMynmLj2ChEdVt8cfsdUWRbRxx8o6poUXkIWmwz15v9Ms38yhv8ebuB4

dBxo5ls1booFSoKvcShKlyPWvvYCQKoPga1dpYS8UQ
cfdZNuWwLJeXE/IxuWV80YlWbNDXgKJZH/ZIdoMKuCi55uEf7aFs0OVBcaO/ZixUjmJIXrm5wdCht4kY

mhCd/nS8Sd092yNuCxKaCcbP+V04Ozzkhffmin67j8r98gHRyzbn39CqUiaY91Y4zza14g90hW3AD/rF

qfB9EHh8waEEECU7aMxuJ96cxQeYS37Imz2lyysepk
HxeddMNiaZP+M5AtJIzeSm1EywOTzD25PyWfCHnLoU3FrHIZQKDfhe94nd9EeJnnBHwMEbppJ/sW7MP+

At5ID2kJvunwXOmrQZ/PTNLaOA2qknHEBVkr5IZSXQz7sEAA+xOE8ubNgu2B/lNvPPhYpzJIr8vWwzqb

Av3FL6M5XccMQI97+XK1s2ZBwFn1nJgQJA9xu/FBO0
vdP7NYj9dEpqvf9BVTa8idUUqiaoT35tko3I+51jmM2xYtgA6HCe/TEPQ2kRg9jcy49ZT0HNFFH7ivnM

cP8vymrX5zBN7l6dlbPoA/fPCmr3h/3rgjukttdmJrvhZGN1p6P/XaJkQFwuhZDPQbwXmazXh6oKqd2v

Bnc5EvU8F4iuXU1tFpErlcZK2jO+d6olb91PqbE2XV
C358/ForGwhFUr7+lEauDr6JVXCorHPpxdbROK9jqxk5hpjAHaU79c16/kKayMD4DV7sL5BEPjgz2S/c

aey2gPAM9meltnPdeUADw2iTdNnAo6tnM1Dx0r2NyV2Y+Fx/CHtgn2k5WeOIg3fZT0UNsia4rZ/

wNpCSTEsHSurKAdPlkmbqmBJEkV8NU+RhQhUv16Z7t
7//OZEqPiNgFROe7CjNTpzvux2CPmeSOmq3I6qZOyWaLAQxYLwYl8hzERVaLI2LE1jQ5sg+wJZiZQ4Ip

ht0TBYltfXT5Z4wE5H7PUfvX0L9s24pNbyIJiKdYqcC6g5ZLhEtcoei+Isy7PM5cfel8U0Cg/iTZLPop

hlC3kUvVHwPc6xTGPdZs1OcQsMvBwHAMpqz3plaslF
sGzJtGuLaPEqaTGp4OVxqUS/Q/+M/nAFEGkR5YDRaGYQQ/C8sBCWfOjZcfMan4uljrES+YYhdxhSN4af

qaikWXHJRtErXUrjjY7eqYiu5JpDs9JksabKeUvNkr3HyQk7nDANrbVlthqmGY+jMjrpbdxBgkkAfM/2

NogzRARsocbMHObtj0EbLPA56BF70LWp8Hh10e4o9X
heDeAyll2XfJ+29U9mtUd2eth6KcrpKiKrqOQhsq5a1wsrusu8WvOjeelQNQV2ePH1X12X8dg+VH7HMc

iPEd57sudfe7L3x3+LhQXQ/AX571k/19p4HDEmn/BoWP/BhLrMSgpfcTdgNQekAi8fCJgxf6eti5E94o

xNeccChST2AamHbumXubx3ToB1PBBPF6/IxDsEulV5
ZR6UHUFUupsfhf3jguY1n9piejzgejUvIBleawQrByizVVDI7y1lEFSMC6sRZAYdeqQWdrOCJkNC+qRO

WjQ+Gna9ZjVV21goagaNVCyC+JCct93aTLQOfHErGRmkZJEgZlaOzAWTyelE/G3rWI/tHy07GpwPQ53q

bVvcI4yg8xZegkz2FSaXNHPF2bDUZVvAhF+NF2w891
+EtYUz433T7OmRtIehEJcGaeQxjnjvin7+Pp9dcjlZ5VZeNIwL0rgDgqw5YvWQTZJUhSPuwjdyW3desL

+btjaJGlJru9SLz/SbFZ6J9ZGjBospwOXmjkQqHLVge/NgBPqkMsl64F09UGNOz/Gp7r2dOuPpjaOjw6

fFhTv1/MiERNTIQtST0/abq+kvglsYe+9hQ5lZY/XX
JpLxolD2RlB7Uf+7609P83zGTyjM6zxontN+TqU1YUoJOzBRp3eIZiki+m88UMrb83W034on/8avnKsr

OImT/aRbnyKkweXWO10esvD8MFCCAGLAuPyixwf9TqskWm35ugWOq+qdk1fAWQxps1aWOZ0YlnAVjc1n

r9rImb0tUHoOqnFyGgnVvRMjEARqINBtX2HqKLfivK
c8f2kjv9kvf34M03yBbTduu91g3ZUbNrkaxy37weNAikB1SfQUdDX39W2ziMHgF601/b7iFjDUXcG7jw

37vE174udDsJezPU+3kndgJyHzHW6fdflG5rH058+4ydiLYZ8JJf6nk0T3/uhy/k6CdQfhl/mWdMeLVQ

Z7iPTl3hK1uyZ4xB6gJp6qX5bwy1jwxKoq05U/E8QT
t7bTdhqVlMajB3AKxgJ02nD076ge177HMOZcOHK3ovfqkpDyLlo7FV6W1+tlf09wOy2npgDdS4r8pv1T

DKPue2nDcIt60oH8xVl72rekaTF5ouY2dy0fHGm1KRWQkYY1luJFoJFad69yVIaaf7JtMV5oPhD3nPyM

SU0+Fxvahn1DM8NQ0vR03Rgs0cskEkB5RYG4cTGPfb
SH9YU8YwpaoCQN48pOCQUazu8yuVOYF+KZHrJvltr3ddAkdoX0LKEoz84Ra4g4e54Vjmfb2R/Fk7dJbw

cqKvJ94fdAkQtR+aPhLXM4eqoIgYuF0YEtyGwELFv0E5Lvpo7p49xpZy1FF0+/v5mDLuBncWs+WiJ98N

vCuu139+HHnW7whlRkqjuVVZ1+NKRVZYPoOUZbZ2YA
kgsOWl8XpfxmFbRBLpAoRmQb2HEPh1o303fxutTSKIVq9z0R127nONU8m+h3BCF3pr8Lxek0AF5Vcrs1

Yc7aYmq3+3JjwRuMCtJVF2HDrMAaFimUcdtjuJEbUuzmEwW/7dap0nozPuEZE2yFgo5Ai42VqIasmeuY

rqmMzM1EMvoIMKKWRjYhUwpzC44FjVhHLnEoHe9I5g
3yWlHePfIWblZCGetBsGFFef4Rb35dARr0pj4CQcopK44xi+FSNxrVnxkWTnq9qL8sKzu+swpvdcFB2F

D0xk7mobzq9+1rsJTEzUNe9PT8csALT9lMECc70dsRCqU8usQ6UyhBrB/dOKhZ1R67J5TRAy2+XVvvdt

YdwFbM+Bgh2sjVspMn7yeMMqnRc/maXl8Qyw+5Duck
dq0sy+CWdhZRW/9CpXyE5nrr2LYv/Pashto+Vxn4nm512uUEv+rbBN2kT10uMY0R8w9kuPMWFpzW1OnD8hwS

m8QrINQkwZ/C5Ktks4aHRH6XMiWy93IWMKYs35yg6Ykg2v1YypRut2xWZCdXS/NTOB2VIX62AOATIX68

w3cJLhFGtu7ej4dCb3FFo0pNdEwoYCXEI6egFBqi7R
dDOUHqlFHMevg8haE4ouSAvM0kVQ1gerbdEvtzZ56DpclafHARhDcyLZpyM/Xxgl9r+Y+i+IM3PGBRIQ

Nw2eiP5wxco+Qk6jHdyKfnGuGDEA7A/3zuqcrmqYp3x14ZVbno5zWjR57EA8S/HuJPw+L9UiHk0vrwL0

qncL3aOTLbH/8l04+3eN1wkEWG2/+8b7cfO9N1i93e
vQBuYjb3BPbgnFQWbtgSTeRsWHGpvnM8GxYns0lSLsIw5iXGmIJW6BRGcRtb9PMqGtnJLBTU3Poxyqr9

gE52aPfrZ10DxWgMuNWrCmljk8/OvDgvr5p8wjKr+GFlH6MuycM6QqY1ge50nNAisf/sBPYGuo4ImIYJ

DAiNSPRCVIg7BwvogJrpHTuAOvDKC/5mg08Ti5j002
5g4S9zp1yEuKFc8agdHLe536z1ly591ov8h0hkm4QSJE4iUlF1v6axViJ4D8ZVFpRRV8OHTucmNRSloa

M8i6xqXicz60ydc9OoBTTqrY/TCxHibbpA9FRw6abVoXteYiDeBqe46/wtvsau/cjm8a6DJBcOX1+qAQ

8QBgbxkM//xCXtNdQXSAyAaZYDnHSe9ERCHiDuZ1Ta
lhHqqxKChmrtl/6V/ETnCPPnLOtbyHzGKW0kamy4A8eITVsFWKBUm0L4/2F+I3ZxJr6YV0ebUTNXGUtV

VCqvZly4tLEy9+wXq9UoAQLJqwMHni+I4XLo+nLOp7Em9xGIMJjl6YKh/J78Dje0qAf0h1r5bEw/pn3u

9M2PPUMJYpR6mmoP7cjEQYOg6OZ1Xihu6SuvrwM82o
SzH0GDvDtVbdDlKFW1G8tJTk0BD/bxrrFrwSNpAyjn+Kx56jMmBDq85TWl0C59JKcfDStIl0jlKcSVAn

9m6hEvdZkFUOQ6+GNnR+4NPd3IN+/Yw48gSmgT37lwPGyVwtj8sTt3yA4vj2EHA43BTvJpErotG0illV

u3K/oKpDE1bRyThppnGd/6OvQX2jqysv8VRkeOil8R
iXhdVQMvcaDqkeQs2lleL4EYSkXksGbkA+jW/2Z3fAfN3wNogIe9DyHutNIGcDF2pibkk0Lwtb3ZxjIK

Na1Yx+P0UxaEeZsKTfGkcmkswwQB2pxcaWi+YY7WpJcpJ2p4Ab9jxCmr1A4rh8W0/Apj0sPc+TqwltnS

5iUNh0YeoX3HWZjVx0aeglvK2GdLlf8nDa6ayfrtPU
jwO5w619huJmGVh8K5Db6UlIz3AIMWzBUQtM2vY/NcNbK+keeyw+LNkrHO60bd8GyB+p94AuzhYFzwMv

chJjC0D8FOEdqu7TXQ4Igydlj674o7n1rzAaed+2rphaU6dtQNY3vBY/WKHxfyjCFCC0DOUYipFZxBMj

v/2hXEEZVNy8Lgnp9b5pdrWvTsQW/SSzdGoexYvEEy
D928vBLye+voeZF33Z+aqv5bWApyHxX7Xpt5xbMt816SKphgMBaYkuiSBOG/iYbB7RUYw/6UqJ/Zw+3v

GJpSeEjwJnRSbwrgQ7sZEDOHFOoPIk3Ktfqb7bEh0BdC5z/OuyNfjm/tLUuPOwErRiLZBTE83oVzohWR

RNXh8Cm8TypiPTeKEOyj1GZay5854osK/YSzNbEUmV
XLXG4/A0lXsvwrVfSwL87eoMZpplqX2mngg5A/YDNHYZ97EtMYSMhV/igl+tf/Kkh3+MYLU6C2MedCBe

KUfGt+iq8WkZ5u25NbFdD/3v9Kt6+C+F6y4U7DLutx05YzKm5teSegjFZqnsgSu1mFN+iKf4ePJ+qKuf

n8zeNg5SyO2pDISaAo/DQ9NoCwNDtGhOmz1nQYrXGK
3Cdb9WuAXH3eeD8Rf4s+cdykirmiP/beWvL0M37YKzeJNgoHSeuPcNd5KmwANUESs+lP7Hz9j1DabjcR

LkPRuGgKVfJcphLXRaaM2zEVBWknJBzXe1tqCwNOSTJmeZxJdlwyq4H4JufkGU+hD/lzJ6p86abdER7u

E6oDyqzzYTdlzfNFN5dLUlG0KiUDmKCZhjVIcvY2s+
uFHX940FyL8cCLHZmPUCkx7fvBhYimde0QtVKq+Sc6RoAFhxCO7i2cwsCriyCkUu+O6HGjAHZa9LOnGt

eJXu1wbMWkS09tFYAQGSfXEZp/ydlv506EnIV7E9OTXgwcZzxbXzH4o3t6Pt8JNx8p0a5STmAUxw269W

bFggE65A244cHg1Mo9DQxW74kpLoNJQT2Pb6PvYt8Q
UTO8sQmbk4YC5cnBC5Gj9lgTNNJXT1ufw1SrM4biPgo5SYL0ZqtXf4U2gPuoO38YIN6go76FjaHYSEfm

JX89NtKa5d16BPkQJyKQj9826YZhfRPJEloUz4zWGHWkHQ9EAlbjHNGOn0gdjj0QNHkdZJmPhiCqn7wf

jPyBnXveBgh9DNUZOL70G2MWyGz6G8rbuRn8+aN+y/
Q++yCNRwo4ZjAzuSSI8eChM5sEnQOpVmQ7wJ5B24fD1KZ54cNcxjpNUWCXxfPNTqU1aZ082BycWQQeN2

jHYerKZq74AC0/uwD3kaw7DtwvN+rR+oHSvnuszcPJPX0bNF/yaoqeFkPshexIbeqNBsf7MojUM6sT21

tr2rkr68PHsnmPrqx5Xbz18+ReZ3nzl0fy0R0BMXR3
RCBtbXVsqtdD5ezOQcpDLg0zLT9roWeEBsBdrGVxsvOXlyf5xcVzIMjHghpBNMWc4Ht+ap773jsJsNWG

MBSSg1j3I9pb9s1CRYBuH2rPFjhJ+L0bGAC8J2OQkeSNaLMi/pCGgiNo9I0rFulHpkE9lQYwJPnIDG0X

x3q+ck5l2DquveZZSuB+IVFHf9b2c0MJeU37oblJOp
+2KlHurWVI3d/tazvWZnGfa9wEGO74/GjdLN5XWtUEsnRfYlz7t12mBDVW/fqVz8mkEVUULnEEn8GmI/

+PQSk0kJ8mqw9SiK14xFOprpLgPPNM27GVmob1sbzQy7/9sxh2TIbJeeuv/3JVyjQPdp2C5+6cszZi4Z

+3ygciRJd1G/Jemp3BSwWaBeF/dcSNnf7lOaPsg3t9
T0G3E0wpPdVmQqfUzcgTHCHOUX+TSX+UZzAlDhpMUnBQ23VbEkgIgKqYDJYc4K1MduwDpOit9tVTWOR4

0rU5ozxdNt032zWxqm++tZsduq15MJ/zTh1T7tQjPfeNoA+b7QzWSY0VUtZQy9H3CKg8vx8wrxdaIyGE

k5K68FwhpNuWlgyMroPGGS+AbGd2vZASIXR9OoDmZ+
O4oGjRB7+H35HigTyRnUD+fylXCmUZ1YzfLs11XXwZKqcxItSsm2aXcqeGjWEhtOJgFpvQOobaPZvCod

1m4yvwqhTtpuGgW+9bdAro5RrrzWYO9jU1ZbGoJSxWZyi6FlD93XoQ1wk8gKs/A75iVFjNpFH52ovha5

KiZbE9VLI4f8pBE9a8GseVgbbYDH+HrQi1Wx2M2wuR
G+W1yYAUvN+9Dp7Vso/BipTu1+peVjA75iR04ie4UFgbkjxIvVWuPQXsXSm00UZLQT0SXPbsy8f/gy+w

SdPFnDDmWvBte3zZ0mzxj+I5R32/XKIT39+cTVTQbhXopM0dhyCmFaXnHI1B9IfV7amWNwo70eI573am

juSQdMker3eBtnv/ckffHOmspzYbYfJZWMK1q3PsWN
erb/LKVDM+9qiI0hZT1j41qi9NRcNrhwHD2zP4zYGY1E7iBKkhZbx7fXgiz5b9j9FSwHLNmR8fhY0AHy

vfyacxc0nxCfUN8RvlhoBBs+u82bLSV9/c22nBSVFh/LAJ0fyMRvlBr1t9IvWPISMLHgW+MR7IXqCVfZ

lLtvidRzS52ktScoPLP4ZCmfQgYeJ30TEU64ScIBHo
Emw4ISCflIc1Pv1kYu5hwYGlSJg5TbEaImSOgde7u46LX32xgQT6OysEH9x9BLMVvoi/dG3WAs1FBMEl

Dtb88QbRIThDJC6/h6eVwEuLYhcWuzjWonaR8wmpT/jR8ugS9hE1nZaT1IhpuB++KE/RP4sjJ7pq/JMT

e9k+9UsvX59uTkztV+6Pdm759oE4czNGvTtf2fYDwI
NbyXpjeKlHOQgfqZlPwBnZ09OnWZXUoqk/kerofN3I8t5SqaeZBPe42mE7BOSga+AVZfYjZzUSl2cv7F

uI8yDAhQT8jC6XTJOErqcAREK9IdqNh/gtZnX/4aq07hlk8W0J65TKyBvZoGTEtNtLy9PuRts1QxFdT/

PvAWpAsqiw4xDeKn8fOMEvVctPCeDO9+IDO4eZcrf5
U5p3bcEUbYhO5p3bWkDuowqCl1h5vQm4wzw5kvo2HTtSlBg3+CFa//Yf6C8Vy2WIBHS6Slv3elYvA/T8

Gp9TVcrDc++MT74xajT+ePaQY+ywVWeEJ2sEm9ghEKFCk5DWvAKx0Cq6s8Bi/Qlm9Q+Knj0TaWQeXo5G

uvW1nwMmFmt/dm4dnPogShrkf7pB60PZ6wddb7wLr6
VAwGO2z1XoijX59dqzDDHKKKpyWJ7heL65VPB+2i13C0Nl3W8gfXPH4FkmTP5EKC49/ky+Nu/k1VS+ov

Cm8QyD35UKWMk20g4N7WMWV9AcWL8o1xb2GU2T67uVpEin9pyhWc5LkuzJ5N9AwugyOpcB9lhaB+40Mn

902At+lc3P0hiXsYHdjSjQnR+OerZ7/2M/BRmZ9M9P
7ZfEecQJMHQAP/k+SnXqr3EDUBapqVVahEpKnMe84aFLVoBch/qdbmuKMXEE4x18b6pI4CkYZrFEScWy

KRcfsvGep8Pbrw+XxtQjf46mGIXndUwayVPBDGk72OwLuCCU5n/gCYjiwPEt/lIW7Y4L43+5lRsYHIAj

vJTuIpbu7joNf2SCnf21K7zFCeZaWEKqT80ON8aH7Y
24jR3kAD0afj2u4qMha424JG1kpnxhve+S0UsFokhp2Dp7Wg2uIwzJ9LkSbft+W6tWdTvK3mbk3lIZ6D

7HE3r6UIaU8OLnZiq89i2GnR90WekKbVso04OG6m1uWilLlEJSWOVfIGlHrsLQrxq052Kj4y4CTHJsIN

2uj55NQ10Bh+PGiqMM1gLZbobrc8yJNGYKwT0M7Nzq
GNdq1yDOIm23OQlbyUu+2kflRi8pGEjxMIvMt5tikyz/tTI746d4D6w311Nsi2N/tB4kfpPV+4CqJfPf

v/3PmB/7nDTfNY6cDNyGZ8nD3hBvoMmi/uXz+LhE/OKNLrElCZcNEnElsMUQuxZ4jL2U42sXpzJAbnqY

eB6Fsl5COJgKfGoNEOxZAX7yxk8tBNdaV7ZFDR7yZg
bYvIFiXd67YEs+paBBDZpXOBUx+DKbYZtQtR+PGXY3UF0PWKc4kqLlV8lHgnJxEEeuwMlgDKJjqkBvL4

h7OIYevEDIlYaBPc6/WIBmkhm9iyqzjjRofuHo6/6yaFzxKfc86NZnavUgs6C/P7QJnnyKTT/gwzBHiB

UxUyyaicgnaOmMUAw6hv4sSEPArKpzf1ajfASx5ElC
ARhcJqyISIbtONui6DYn04pKp97uNSICaV0PNFb5dU/XPfZ8JsmpN8TUw0gWizTgl/3AI7pAywY+t9aq

x/6fXkYrYCNydNDAJq0PZg0mIL62VDkQsZ3VkG0VccGvrcRCrOw8Rv08vv+bUZWF/WznBQZkZ2+TDUr9

x0AS5Ddx93DztERhXqyxcDIpGUmsEGAdDAFx0DCo2m
Mm7Vy00j6n/x/IrNfIv7LLem5JET0rWe+EnZvisepOnVIhg19W8l5fxkHO+Y11JgDNfjJOgb3KhhYXT8

P5GUfdQ1Lrtbi7+JJ0zAjyYTT/T0alsmESc2IwDPmkLfiEFx+VFWtA7f5VPpBqjNzz8LHmPnfzLaYVm1

bPDnZ/gx7JyNjGtI0cBntq9RGx9gxsqG+ekXToL6jC
53jTTCur8aIb/LWUZwMyzz4dZJZVYoi/kcGzEyDaMml2jiAEX2Emf9xhjlKBCy+aL804qA3KhqBFHl2z

LxiquVj8JGe/Y+ZcCP8cU06mkgXgosAwTfs7d7Qapo4qJum/hUSNRApSzmddnK47sSd4Lpe5IggvV07Q

VIMYlK+xa5FCnZnswwjD74Uf3Ud/YsFyXyztjx3J4x
uCtGTCxSKSsgA4o2N2c+1b7DlSNpsJLgZegsMwFMd+84yDo0qOisqf+ujyyE/wy0FWR1giAvaBIUJwVK

/yWN3Pj1524uRGe2rqf/rQ7HdNOI/EvOEXHECkR/Wh+0DRNOv4//Egp9Rjy9d2/TON7814IA7E5yHZvW

u3bNol1xthAWaXqdvDIHgPdvQmaYn432spZyF9q4gR
Y0iXc6V24LgehsUSFCVr7Ljvcfq2uehAXRuJn8xEy6UzLM55WlRHg8Ganagmms/n0+uB2Dhoa+Q+q5sy

ykxmJV5J7X6YmEKgq/Yumiko+RoMXfhhHZ7y1BBpJc/mAjbqCNzse5ekdW2YIf0M74zk5aeO0362VciFgUQ

Gf+52m009Fzjbq1GOvqE11yaZBPEoFiLQOOfsDnAO7
/4XXNZcN+VCCEymoxyrm0XO88SFwjFgGE5WEMvaORY1cXHwyFD6wzad2+KboBzjy3Hnj9gcXvCiAaTMj

JiLjXEDzczAysyTgU0ciEM/ERNWo6xwNkabx3WPecv5Foay0DX4B6i8Whdml2xESFSvVHsNPwpKgc9Fx

jh7PW79TMK5LstuRtXzyTEHjdueRdL4aE0yv+2xZu5
z/G4oeCupra6yxno0b9VGEhfMDRKPZhTJL502+st/+hfVccSt0Xd4hJcRs4B/c9BxQGuq8SgeeGE8ko1

5ZMjXuKXYFudk/84UrRB/x8fBRmj+Vz3h7T/nDUL/ezqmYT7s0y8+0+JgPmfejR4l+MEp66CAUmeXZk9

Ocatmogsia6JUOLG5SEYBvyuBaHKFxcu+hxOcRZPtU
m8OiDefBR4Q8qwucMdSJZ0//wqH1qtgv/xRBTol0VK3BHCb+0JQs2ZiJC4BydFQ6LYe2iKRGXWkLaXNo

0i8tLo5ASigVU/Ys+ln4o+reBQn9/WYV8hcPRAWosD40adHpJt/u0HAhq/7FofDm5pkD3c+nhef/DpOb

3wNugWbCRILUOeH3TQZy0+v5WyFEl7/nBc9tLiGbRF
IV+7UuHsiQTWbH2vhGLa81pkTUC4tKa/Q517MIecCkoTlE8mzzP2Y/92m0BahaMxpPF6Ls/2XBD3snCi

2Za9EfX002/4ypY0uKsC5WKs1yJR9nV8bSThjWeHxFi09l9UrsTDiwB4v+w555530TbGjIEyCmr7amnh

YXudnS8iQVuNZ7U9KN90S0AeSxdwFJLsV1z2G2n2dO
CEf8Mx2jEA8fdM48gAJtzekT9qYshUp9NF7QfCeVsiBKsQWTtnezM3X4enWmZM1IS9BdXf7YUwpKOiYy

ihWdz/zF6LpFQHkRGhfgl4S+A7999dANQF5BVDYTrqdR/6qyYPf7JeN/sSamSKe+9pFP0+R9BYEPfuWT

oQYNDrblzJ9sFXWQBUf+41GkQbZF9NoukgLQ9U/MPw
Fkc26eZ/sD72h/jYuoPCQTzubb8aIziKNHkj/cVmhzDatFA1ZNoF1Q6hlz6ATUCWUfjSI6dGv+ypGqCl

3EOGTyntcRp6dBmXcHq/sOpppUwisWY+hRjmfO4huEMs9fxe6UapFIOj+0FEmOuwN0MtCNt/BYmTdp/4

tUzUBpPQCinDk+NSYQLx/2NnR5Gkyyh+od0mM43sx/
hH9DI5cAxWvoEnS2jwLT+6I2h3WwfUlH/OyeSKEhjxpHhcemA7Vr1w2kw4gt+EkAOf2r2cuIxSMFw+Bs

N34sKzF+87bCoUM2nj24FR4/la0lZ5CizrkAJZ13Z3Gex1YqFtGg+y0PYVb5PmGb+tyCq4OYSpg26DNc

P2nKdgtEul+If5DqB1Uf2nMoAlq2dAo64pr96zSc6h
XPgfTCGew+qr7disXpTq//vh2xSd2csk2lBUWCI7uChdqv+XnPmBdNuo+OvMafsocqzqai8/s2bfD2Bg

zqS4S0FWCFW+Jchz1q6kXjmOATE6sKa6WOp8/9nDk+sq6l9HLgsTN8Z01CRNccwF8Gahsoa7lMXtkfPq

7zto84v6jGMshk+0U7kTpp9LYyb3B8mLIRus4xQ+0f
7M+HhAfLJx/RlI8GZC0mgQwfHAWWsUgSxrkrrNIOxgSjSRqdf0kEV1ImrMiGq76uiO9vLA86PDTD3B/J

J5czDSnuf6QxrtjSo8WjEEBqS5T0JFf4YRHrv/G3MttAkR0ZneRK+6YTQV84jJhH21Z9cJYL7nHlG4BU

V5GN7yx4aZ0BgRof7FZaj/tm0MJmVNClkxnnkcbVpo
W0TlBlETneHWH+D1DkiHLKx7XvyIDIbjXAm1xccoTsb4lW1OqZnOnIg+/CUWYSUJM/dG0c9gWVzo3LAB

eHLHoynAz0NUQPEBhfWmCctZu+wtqFJRSRvRMVxORhU/VhQZRz5ZveOjR00DBYu0ZtkGPcVuXk0sncw/

MD9LPZRLYShJ7YzAD8d1usEnMd1hud/XBMVsg9rDoL
fG0CiGd49NrQkd8/58s/XbHX7BuWIqnE3ceKjXBvAHyILAJNYe2I6gY8wx4s3sWrr5L+J2KAWGxg4UQX

c0cNFGQNzN4lheSUeOInl/5D6n4CeV+WJkTxtIojNNoVYSL1RDOV6ozTTMeZVCs4l86S0Kr74fb34ZjV

Kk1yyb7smbqdDvYeRcT6LtUwAwsZh9nnkkhgejkLX1
zrMAauoDoQ1RBPy9d/W5MJYqZKFtRgNhWUOHxT8bMS7V7wqDqnrzoSdb+ZC4qW+EtAsuMhZbet1bH4WJ

IN52UWUELR2ZHpwWiMlOB/RWbGNv0SNYLxG31lzee1ET/Mn9eYR51t6ojt089Jd4PHha0I6hWZPVfSvj

jWyWsX8r3FEFeGZVJu1RNxzr1VS/NfbqzvMkfytDhc
6Z7lMK6D8Prnn+HnMmnZjX7sOmfLy94ePvQBGeORh1FGu3A/WLkdeY+3dFWg/yEdh1b5+gPPy3XocIZQ

KDYAKiTKaMd5w9r8/u78aAhXBqym4kqYW6aomsWCg6nYEIsDojb+EhAC7xcf/sRTGRB7FxqYlbizohMp

xKoYofB/Xhx4io+kZkFwVgfjV4uaeyYIpPu0YDIyeP
VVU//jf6Zqw+u/ygYqPkNtKIMwE2Q+8CaftnKNacmKT4m3ndp6jG+umd4kP/ggg0BN06JKxlW1euBWzX

jDuwAa/93Ju+S8MgTScrYvptBwB5YkbY8N+3v0MbPcy7MYxURR/+qD9jZhYFygM83vf3CUK9+LCO27Oa

WDWDLPvpxiiM5f2c+ZAdBcf+h6ex+6uDih2urmfnaT
ZnvyWLh5xk8+Xw+Za/F4p0AtpX/ygNp/dHbPg4ZHXAufSOqkrMhhqptFSGbdrB2mCxTNTk8xBI9RH/lf

5K9Q0LuL5Tq9sWiL+297zW+DcGPGAgkZ0TqHr4rBhpCLok8eGGyWvFlhNTnk0C+1eRIn1LaK/IdkDM8c

shPSZnX+33uZSepQWZri4Nd4qug/zWD2aab6ORHiVL
PRQbYJ/tMswjzHjvKH07v+UH4C9ToFp+77ATLHI60RZHT8Zz8Hmru9HZ99Eh/5Cjr0iGn74CcH4i/1xv

2/gJv07R7XEttQ8Cj6Fhte+FqpLgQEfog3GCmjz4HBOF9I5qTOHhABowNmdAJGePYx7uHq1AJyKH7MKB

ZEtapKLH3P2kC7HU0VNeitm+nUkc6VThzhDdpsfLmc
tYXVX246TwAgp/H6qKGGS5/tcVYNeyp9tlNggk3q+BWC1Z7EDQoAsEiobNPxs7wIZ9fD9iHtdiLQN0Ja

aodk/82JX+3SP9RPxHqKAMC9RDu7aWIKHTBPhAQDVM/He/Fb21ir0sIwYNYQyB46SO+I/aqEvN4pybN+

3/Hpe+Eh71lDuvBGJKHQq10UH4LRjOF/tS3Xl0gHvL
pH7yhIFxiksOKrt4pcFAXwdtuDBNVl3YSPbBQJcRWc6KjrrmjMz1J2ajw0oUrMr5iJOWORDaR77Qgx5e

ZMlDRlU4V37NHWXH2t6m61cV4zGPi5lHY+vHAjupJmRiP1VEvdllE3nUJJqJEtjWBJyNma7qg714M2bD

49ZdQQ3omILEaK0Ik2Yn2KDLKcvcsX1MGwI0UgmRqt
Trn1PzHbcCffrRf5HAyAYJXypJDIXJb0JWMTEuU+5gB9Kkvq8dNSLU5MsYrGar6fEbBisvU9GdwHef1b

1Flt0IQ07Uyfdc9Tn/V3JT6A6Mp9bLVjkOP2iVRic2/bqR8JhqQ/++2PDQbyqyPzhM+1IyHgKQ3uK1kd

3Smu89dS00sAiscGB1t06A1wE/A3ymMEYme/6H6Rps
BoND3MDJCqB/+dGOhbtWQoi2ImMWylzBpRCRYLtPle5qblMd++raLikx6ksYstVm5yV1jPDAnp+qXcQV

NnYuVkLSpIqP9VYIzf7Ke8rnqkSKOSxNFZq0pqwhriOsxw+nNkz8ai74LjyubcNwDShHVhletYTc5O+E

az6HTEa17/Q04MZdt3NFypWqBATvq0B4G47uHqW9TD
VCnqwNhGulymNXIIRTdHgCnE8MLXqeXYIqaG43QPuRZm22M4tmBhgqW0Oxq7mYs/sUwCX93n1VWK9KwO

5vq41nG6KUWW+/yl0PS2d3/FgwN+Nj2jgZ0vn54YLku3eh/KbOALjbkLRSvQUcexsiorNE4l8cnBh0OJ

ShSC/iugufpKVp8z1BPA6DmP87mB0CGwv7yqMoLDYE
2PcBp7HJx4acPrI81/aAKLjzbF2VOxTVlib/FNV9tNdhHoQ9+SuHQtR12EQGM69kmd6euRRKSse+taQq

3r8fLmAYv2huzxVkjuXnBYm5msScObrU3YKEs73eNuLczKW74PzOFcXfv7Y9EF3c8DJAzqWUMVUDXu2E

HgFAdcodEHc/Ip7M6dezPbXOMM6HCS4nmIRUrbxN9h
8KBL0Fr1dG3gynxolZumcdwYyG05ZjtMo/wSVh7lPjvMQttnpJOLNAh6qe6GR4kpinAwoGfCJaz3P1DB

tzY23YiBxbbsEpzmcHEQjKBNmt7hLh35Ma583EJnkwpB9k+vW30WTRybcd7TsuAYfBZVbJpTri/FloPf

BUEO9cLnhB0dG3EbXjXooaR0RTPjeH8KUDaheYVjDM
gpkWf1P1Dc5Cj3lbeMptJvX7dyzPsANNW9r6WGvC2R4XuH1b0VV2JHk4t8AIYgeoT/F4Dk/kpX8GrNMi

JVburzqLNB2uRvPbQZdyJ+gAiRfA/qNaDH3m+aOfF/x8g8oQSZofHGjc0iUAlz8jlRTqLs4untdqDRbQ

SB40Q4iB3tX20d+9y1Khy8DfzE65nhFud0dnchHr+D
++pz+9q+zqymQhfDez8k/AtHh+wUXxAXx1YiQHgXRO+cp+cWSrd/82HRZVKzhnzV567vTXns+PjqqHvp

gv2LKPfO/8OBK845xNd9YvfHYUKDDZhxpgMGh5hbfoeaAaC8Sh/8FHZcEgxvLo2i5is++Xhu/marZUXL

gXaCvDlrleiJIb1DAmAKnXPUOpJpbKDjBbiEziVV8x
epJyr99DctlR9u80zNoZrGHSSMxxG+ZNmozWMT473KeyEQuf/fXDNE+H7lJYZUK9rsDLucZHO2PKdyvz

9btO9anxHOqk3RipsjfvACjdtl5xDPjCebv6b2mazss9w9QsBUEVcWpuOByCpKHRX0cp0Tx4Nz6uahLH

yrM6017WsggO1Mjc2CwrIZfKQv60KFoc/18qV4h45Z
ve4fErN/tWQNI+/CNjrNyjqatv6vudQiKTNkKdCBGkn+6Q0zQHnb+xFg4rtxSC3gOEp/ddtrcE7cv1wx

/j7jmDlpAV63L2LfP4Az3fKObNH+PZ2eFh8MGpxIkPmt17UO4k+Dj1cwpMOKZupL31x3Ib1mutj6/rWx

vblPwzF0j1hBCbk36pzZ6NaQ9gLeEYA7h2o7fWSVo9
Tm6iBZc3OJ6kBVjkCg68sKP68AN0/hlCpYxfwQvl3fFw+EGxn84MUdOYSHF+QPclxDYajOu7PO1WTeKZ

qf/D3QDaH1cwqIta0uaJsxr4abiXYC1rYQsekDaO2d2u07oguWLrqbRZee+nrcWrF/IkkWW5GRt0wi0m

sdi6OlWSf69lg2Gfi12He/xsxbBcXRfWG/B3DVz0hz
HZevGKtPEnHnAeWhNGUGYMkA4T7Tl0wDtUBvCsxqApmTlSRN/3j/jD2U4Rt/2cp7SDoInh/aa6/1/J6u

vdefU8/+ON96ZuYeHduq8lLba/iDcR6P6uTC25aZks0gBwhZjbz8AIOWBCDg5Gf4tJfFz26TxCawZMcO

oFP9yEtNVUjxK/EKhruFkJaRM7BkaYbO4QHE0PqcS4
h/YWzXghVfLM+xU37NwCpFUn2jB9yAmfwyqQUH1NmZ1JcUS2nhd22BBhq/Gcb+sRWxhUMahLUTdZb8Qs

cTILgg61PlpXZ7l8r8WLYZj1/wQfxqWvfH8bvGG30za0IdERiIHZ1piHuZOJeqILYjlDypNxHfPdLf95

9e1EJoEGpQImHcTup74X3uTx9vlmcdoOZqhtrmSnsv
vpXkgTZRsye+f5msPMOo+rUsvsBWUWlps9R4vDi541uSW0rV4MTSn7sjxjq78kLgmG3fCZprjwrYQUOX

Yw0GMgS/MW3tTBjQPBoemFpZMd8/RWWXkY/se89gn0DkF/xn4cX5heVfR9DDWZmWRsbC9gKRXZxap2fg

uiCLPI9v/KxP1+oEah2HnEeLLPbjJNjdwBL+c7GX4U
KKxBbt/0vul4nN1tHTjLmM9Ne96xmsjiQPSGtVyk1Iykmztna2wAQR3P+dPI5ZCik0ScCvhs53FNwMoX

a5fjLyONFiABUv3FlCuQzoYoGUW2D6QKSGjXG7LZjErp0CiB0larB7a81O9njkYS0cYrpbObFGvhWYvF

LefYeMNW8uKY1b3ffH2g9ievufa4kp/IvyZYw+NFKc
c3UbTBGCkljJOFN90hGGDXnTyDVdR3seAz1ZKpjpyeKG9bUZWsBTC7xYqyi2imuMTFQQgXex+4y/V660

TChkxN2RVXFsbchHgzrOlMrAyjXSIgh0WIF3u8YRDnMDzBs5CTrdInYE/aVu5q7z9cxAjEoOuK0pny1p

iXKzIf4c5OnMXPxFNgcA+GBv8bm2uqrjfGKhw1Rchz
wD3/q/1ZfbqH1uz87rhwX+28D/pI63X++WantZCwbLjwHDR4VTW9c6M9/MjXKElDv7htQvnnB9sUjBAq

Yk54oVrza8TT/Det4f4O7W3BEn+rs/am8TPWxpmWISF4MdFjH873bIt+XKBUSr2Tn32aMj/Vxnn4UveO

g6wNZk9CbkQJKUy2kJIPrqGRu5WyjgoksVXOt49HO9
Meng+JONZ/QkQKYQ98m3vUHMf/DXAm85y13+7jItF3AlczNKFRnIWiaANNOLkmEgNc5mnARQQwuLo0yzbE

w95qyfqJjfY0A4eUWOYKsIPK52hylT/nJ3jSjLR+qNzDomnW0ZbY7xwbIMfE36kKxCAYRpotHcviI4Zp

ru1AdcwMd8DdXIdONDr2mKY2P9f0DG/JIzmCihCPgN
YQqYy3Np0WeqfZNt/nH4coV7WzXgVlSQGOcqh9Gh1YSajC0qAdwuQKFev1ZbN2m7s/8NxdfFoZES8lEN

5LD918eLAHLHIMqaDbcSVG1GudjULJ5utMjLfEIGYtmNDmDT8CS/3XFYGVhreCujKc/u3Sqk7HE+sf1J

ZhS8CeX1Fib2Sp3gZBLhc0e/rMO0nhcirnj5cxjFJY
PL+zMvkNaAijhJOR+kxb0MEk7mnirkQIMVF6RAQXaMF0jIPAGSc8k4jatWaqMSqjgbCmtD2of7YPOQ+P

mD7Tl83V9MLwzUmBRXgczP3dNSeO/RJ2Gwoe924js2Y8c0aTjJnWH/krWA+ypRo8DfISBVZGBZUWZipn

qvpgvwn+rYIkPpujfUJuTWSxhW7HqFlQZA3kms3WPr
CCS+v9RcP8Ennz2ar7+HxXWt2+a23K0uVI2TBdKb50XJKpmcTj4RLBC48GoVGL53V9ar18YggrUe1eeU

FQul5wQR7tmB2pFNbBnq0tFxvyXjJBxSPfVzZEWSP2Pws7HpgN9pOlc2PeTzo2FfHuVwSlgT1/tqVXMu

nMpSehmgMEOXuI9ZMT5Qdq6cHeBJt3C8gjhLX5kRtV
j2nTqTU7kA5Bc3AQq8hw04IdCiCwjIgN6Pq4vguDbUAqrshfzndMMflVEExLWaMmL/hiNy1USG1VZkOt

zf5DrU3cZYMK66LRH9r4ClydoBc/mu1mZtY2lbftUdQF7iXlKMyJ1pulluelBOpmTlWJOxa54uGC2RnO

X/fqaaRr/Cyz21h0Djx+qlIIW5GOTi4uXsb7BMdT+u
5mdYLDN91oT9V7+4CsqwmBTch3QN+tcu1E8KONIHA05qDZzb8jb4irA3KBeGxlgZ++pYzfbDFNbNuj9M

n7N+NbCS5+UEi/5H1va8DaE+cjIKFvzexm2/cRkBeM+91fcx3AJLqKgENVzJ7wYAn4XkwRpb97t5Yqza

GhC5oLFB1cTiHe9WIHaSCdSdamBPc9A96gqccgfLwJ
RvHh22BJmfWp84r6LmLFxkhXVCoew25xvFA/eHxaL7eso1Cw3guOja44CzLmrZmJsJMWDLhQUZGT43G+

bEPrlDaRN1eYASFZfkevFAqimwsb0pTLRFEbqiCb6r+Cora+TP5H4mdzNXKs+fUdfheaCfU6dLciqhFkO

h6Lt7K4mqvwPsH7JcSPhnKk2+CIqvI50di9pPCeff6
7fcCYj+hYAZYCqiIDdLyiu2QOg8iWKwSrpELOaiXLqh4RD7m+2TaiJseI4ls0Bt/nRoLkvsR6qr+i/l6

+J2NXmj3FHihYZudiBV61l7H4w+lANA7guDpxd54TCWMu/A8XTD0vwKu0fT1gUwQwKZOdjncSlpke1eV

mPv2geYD5ueGSn/N4BHHBZ2ForDrWLLxLIUaar38z4
VzhEmupYdCnT182qNSKm9W8Wu14VOKvQ8aMJDekC0gJgec08yvgb617/xYtkdoqx2fpDuf4yF3+jlZTM

YUTiBBP9aIyznZmXp/MMpCjLz5Wce/5USw5WlszyE0kaIDANpyoSkVgGLfBuU5H4y4BVcy08Dq82Rn/d

sYCbi1bI/B0YFwpZ+/XjZi4K/LEnb0n3KlZjpTq9yD
dj5Lrm9GdL2Wq9/fkYr2+x01PGuMAvEpA8YFaVyVBLqsMUsVyeDjk1a+4ANTnuuTTncKfULt6nFWcvMh

Lqz/UPaEDlK/Y0P/MWYR/z2i64D0I8HA+94KDqJXTWQgH/13x3KBum+kXegSgFC0BQnooF0R7YFS4A5V

GdvdjzJNOFS4SHeNf1h4VQaqBwhPP7j08yJbNgFxMq
jj3jmKD5ygNwljW+a1mq2RUVpXah7orihheiQ+cbrZVLFclmCiihKT7/Ed5lHxn6pf7A2mY72BM+McTt

JDzD4W9w4T4rtWtIjF7NPmo9LfoKmnv7rsMo7Hlo9pg6DTNXxS7XL5zWf3nBUlWXF8LDqZyCvSL/sGsa

EQXMd0yyjw2Fgscm3y/aI9tg6h14O3vJwZJSfta4Rd
z3e0P5cSyGb5fx9CkD/pr2LZw6ZlFIOkV6XEmtVfEqnqoKzhY5hBnaECevhqNegCxpVCg8GacSphIFdJ

yVpCw7Frl9wm6Gyz4lTs+vpzjyhBRyV+5KEfJRj/oquK0S/2KHrCkYrwH//Mi+MkwNJ+3rW3jpTEp2YA

DJ4bBYkdQfJqzE7BnWZH3E7/gBxOHxPfao+b4GfCB2
CmENZj57ACqMBomxb2tTt2uY06WeZPIgI/OVI4ed8AqxrnAgyyqyGziadAH2qu2bY/38OF2q86hjwD3o

tcVR+q/Y3T0+b5cgL++mvfJCb8JcG4jfjWEzxxc7aaoZ5uu8A9Plf0YcNmWzW4L7h6U1v9GRwTsf7K3b

NwllxcuLg45T8cWNeCX2rD3PvkIGbz0uezK0FQYJRl
OtDTPPrzaVdALrlfSLuYh6J0LKKROShWW9OVWgpPgGWVGkI5nwkz0jsYHheQVpQvgyfEeerjRXhz4spI

ukmjQMtrapFNxBTtV2sUV0hv57SRYvq0hZosApw+fk2MC8zPLMOIpqe+hcBOrUrwNB14cYhiRh8l2PhR

lzqftPvUtijQL28GDn3AhPyNa/pN2/VK09Hvexay7H
/EN5k1YAg5uJ2/9LFiL+w9sS47NCzQNsHWIkUN2xfr9DqoGzHW1pDtnGHnAs4LViqV2vg5Lm0PiSlCWk

cDlrclCSR5VHaPT3ZwqQ1qJAP7B2Ose2v6VMk1teEpL2lVQQedkOwoyMdfW8jQWx8w1EoJCxZXzWWV7V

7gOtnVBwRNTehiaMqtK/MCPgm10So8zzmS5ESjnNNa
kOVb14c9OyZMwwsdNp8hpRbCqk5iNKUjaWCK1Jp/gDejx9Oab0PU8NKT6IFlsTUgVeNjkTtjIxfQAuFn

5KCwyqLBBEKXcte7UwTrEsBhSd6Lxgku0DeKvlV/GOg1yZ6Q2rUeI/PqbFGbh+DUV6iCXSMwUImY0JxV

Xy25loVVBoGigsCpwK7cCbTkE0XYpOxLnV5QUmNj+F
96SPHGFPVizJebXlHXa8xs0NYYTVcjwJN3h/RoZvV8E08WhX6WRPpZp58fhm6CPjiKzORns4vOwwfiqe

Wk5Dw7mUzZ8JfLeKVRrf29I3O1CDGgaXMa0r/Z2ZhzWLRpeNjmX1fuFpU+INbG7ii5L17Gkaz83vtoJa

zJZ1S0WqGr2qZp7qs6QKYhhuRAWvjDWMnDSX+HpPNI
ePW0RuZKgD35pjKoekIsYc6ANqGnNtpqsurkGE5y6ORGW/f32Z/1xB+o6mpbi0x8pw+pQbJQKyPLNdvk

Pn7F76lhPEbDi0XpRL1v3QWoLPFDaH9rb7bACvevJ+QMESY627PaaFgsT1VugY8rQ5xzjYQJjDjQ72au

oX6Y+eponHVPYvQXb4PCs17v01yBXlscj8GMUHo30n
0sGDzlgX5ILfZyVupeh8gsf/Kj9BzaPOY1fxNi2quWGmuF1plEoCEI0p97SBxQT2bBs/VaHUdGmfaHRn

/pgC+XJptA/unQyWuoWXeB9ukXSyVfhJrarNOO61SAJbJ1Q/4SkDA9DhKyd6htwtBHO0NZ5nfB8+xKz+

kQKGjTCZ9jJLvBRALOMSi5kFpqtMBBOCnb1EpYIdyF
Jr6j9vuYy3TP6fK0sZXOmSsvr+LjMNBjF14woP4sNO7443MYkf6r4JptYVx9pDcXqg+BeQJ6XU5R/C1A

eGg5KJB2N66vF1pc/+8qapcWGkBSOr2zWYkgL/0S5baxmj5Uqx/zcLFTazluoAgW/e5+JRiLXlITv4oI

o6XsMu7tqh5N00+va0/yi7w6Zz565m8dskolmJoi8x
Pkj1URvH8f08PHOjoQOCRz0ALGFJUtKw0Z7WxuCHJ7mi85+EQJkFB1jNR/OiKUzU/rCLb0UguUrcL3yd

yL1RKEf4SS/Z9VimQoq4CFnwgZhzp1oJbL6N9L8yziCR2XG58A2RJa8PPv8h034xnE6jWXcxFfgxEOZR

TW1NRwNuPLsn/Ogojs9sGR3Sl//avKLsR8u3fYvODL
An4UPAGmcrXiIDZ04hrNVcOe9956nXbZ67+F6xWNqzr4cPBWKGV8h41IDbV5SZ7Xks0TvhNE86A9VtS8

SWUWyO/dVNYWgfbeyhL+u+XqKtCk3+B+YY/l0Sk/lzdHoAazpcim/bIxjca34TZohJtozA9xKxAFE3Qc

43xey9SyabDtFSgW/skTU8u3mi57cVfG6oUX50aBgM
5jBD8TISUl6612jG0TLabDVEf3ZLEOgMbO7MmyW75mu4R3en2ed0h4fhbcN36XYU0NN+tdYpKVq++d0v

hH88BemX/M0Yvc+TyhDrtCD3hYgxRcmM5M+WltrwfcX2JOHJA9J0Mr3jRugYnOev2j85VewFMA/ZijCS

mrs4IVY+9662uXpqR+VhFjWQloWTtAZ8K3Tbs/MHqA
rQzZvU4utg05zVOLfONdZIjWi2EtYbv0TkiMc2HlB+FoN1zijk926TxDkoQje7QlygexCj2Xef3ptTJ9

U2rPI115PH63ak+3J6SKmlsamZykwMX3ROgXD+fGA3bS9eN4jozuvqm5HFmsPf+zAIwCIxN6IKWQatgx

n9YxXYRna53OgVZ+knPhAVkJ87t8Q0O5c/U+dVYRgq
icUYa1tpJicok712oNJK9IuqCXYjcBvysGrLtXqZLANUCE1PVV+FK2EkyxmkPflG37X5/U88yiAsVK2I

tVEq7B/gWmqWhCZ6MbieyT76WmKQZjQgzJskdhbHe0m7w5UlNxovcaVjiK7ae96nT7/PUT58uBCCQlz7

uhramRb2XNQA96My/Dbud2vh5MczNO1s7AW0tu2/TP
KbEsPFtM+AH1lRpS/jUju2JmRrhQQzxCIctcghCUQvsvqcCe9AFeJ+0oMwQhZjzmTcFViP6xxCTpO799

hQwAeEO9EUA38IWgyLMPxHFxnvKqxvE2DLWOLBSfyEiBPVSgM/0cdGmaJ4YzjwA+VZk9eU3Pd1fFKOOQ

0Xk35agZ19B7O4Q22s1qDdfQzeT5kStQRdgxOpYdSk
SXoOwBLhK2mfYdx30ezs20d3Swmv/fRky5vljkqSzPLSeOgjvw9IsCMiwo1xtQE1kuACshk241P/Hc9/

C1XZ8sMT8wMgObCKVFjq/ZZfoXj+TMU1bWZp9byYTVfZyESV6hUZnD3sCTKTPpTNw23fgvO3RLdg/ix9

acW/MMw5E+Zc/79/z2quonG7ZEIbreap3+I+Wzp7r0
CFgGwmODPKH4/qI1qbD8FiMsryMN/oXFDHkximvyg6cXs/t7tIlwHnhed8GHQM+cy/BwbON7L74V66Y6

r13P7K5r+fttAGXnKFH/Bt3lckYh/1LxUxk385JkTYKh242dnsIDVXfyBCHBgTeiACqCeIz7Hw/obf/Y

iI2uodLc/r1x4s6jjUVKRqlExeMzwKDtPwboaI5O92
ojpeEcoM6rqXdjEKV7sc03f+tdNM8z9agh86GnCak0xyt7RBTnPIn1Rm00cvPR1MwwpcOg6xFIyp91PB

1jTwhFiWa671UzcVJtM6r6QTqKcnC40R8B5RxwEGSzdC98/fvPYdSnX5AXxwa3OIHJKMhiBEG/zzFX52

CCMePxOjbaV8KulpbO6aF9td5VDmlKJAIiYjZFM8Hq
AMgIomQg/JJqPfsCslGLT4eL6TM1lqYtpApfazrLZRMDIfO/9crNit7Ik5XkI7eVrn3rhDi5IPBngNC+

AYDwp0qAFfGgiBzXWbY1gMPL8FbgAAhCwcmJ4CwPYho88sZZsdtFl3BMT4Ov+IWm4tBgXYa8kXX1QxWF

a3Uf+x9x8DmUCxjrzkd8OIXAsx/01SdLTSr5f+ssOo
ME2OLYFoSajvPjrsZReUkmA9FXoKIFGl424wFpqNn2Gi62FDBvZYs3eb6eHzrIaM56HB6/H33DtUUiOO

IteqvsH3p/I3T9wpdCr5Ab9q3E1Du/OwvoME5rSreENbERW5o6Zvd0Xz8t+oqCJBuTyJicuuAiWhFebo

MZzidfKJgGEqOG4bm17AFoJ79sAOS983+8TLrDYOAc
7eJljTHegC6KUOzGuDgwBR92SG3HQER8YXBT4E+DVKDZeCuEPHlpUDPCAK4ew19gRk1zNiQPLKtg+Mvs

cpHu0/jERve0mCQTblkZ2JnF/ZXXb6+hkO+vLHc7dPqDisuzwI3ateEcTWO1EsZy11H6yi1Ve957oW3b

TJ2aJo9Wdev7LMotgyJPiPNMJpWeZ+y+S64xKfEXvR
zLhtBHT0JqjcsOjT3ib3eVOV/A5uG7JCRtb7CS7ft1MNjFWxvtOrLma3xcgstOkl5AmPARWWHQafdpuC

9qbklRWPd/hmkF7VNPH6faOfdVglhqT1jMwAZdIOA2sbADjrMYzm9sTADrLFZEK3HkgfVuDFQZSwqWv+

6/djxGSq0DLrkXG02Bl6CmWaUIFA9Y1RiYK6tV9Y5r
/VuEfWuKpROaAJERdx+SDSRcWC1nYUoJNkn/5LRhmXiflFSrl7Xz1lJwQfk8RMnPpm9sX2X/m0c3ZD+W

QxDXiHCCfulr7mgwKUsXq/ww4WuoCSWKM74C3mYqhaNqmAAnDY7JVxSVf+S3OJskyQs8qOymYCOXqJeV

e17PKBt7uUxV8Cipmve/Uj/5JL/foi9OE+BnXhab2F
08tyCajgQ1ViEzJ+6miehgrm0RftXun+otax/JJoVNGAOlzsXiKYc2vKdqXXIqwBvIDqewfDYFxkRXOJ

MKVklrrq5xwbpffWoTp+3KASCZxf5YN2TSbufcqULfAG2rbbEzxcmI70/b8xiQriwqctWSm0KCkOtxMF

NcsKXgHkF7CEHULdHOQJjFzQ64KcgiNpGNC7tYryEz
BwjUFD/+2Wg5+ap/hOWB2hSy9AIrXBtMgsgybsk/VCnE5TfEaRdMgZrqy4ORugd3qId1hPw+mk5vTYmN

SVLZiaHO7tJhYEVg+0NJznhLK/dgQBXEQQlaipU0HbRiVqSHRWgG74w2keTfMBW5CvHqFrFiNnECJXQ4

Iucrb7ivrLkftikDcTB5ZBG74XM88uowBDr/zYQ2Bd
s/U0V+ZCJTlEcOT2r/DYMLZcgVtVkaEs2qgg++qIdQBqQNCPFiB9f58NFYKtaN2Oo4xcMgD7nt3rH0bk

6GJP2chKY4EvdaXytyp/u0+EPa0eB0/j40sMSor3bxv7PWj8fIQplB5dfZ8pKxaitYCMfCongxmF+riG

wNKiSJKzO2/rnYJ8Wvas2K+KEGuoHkOhqJB8MGJ2/j
+jIroRu1G1hBSsxQrTWtu5jwADal2lj+ds+yvhGxhZJICvPb017xLH1TNp2s/QGE/BwF7qWCEqmqHMNX

8tF3NYVQCF55UaYs/AS6nD6hul7bnKhkIwNtUrChTFOBCikmgrWj/614KJSAa5eTJ+dE6PfHF/tdjJz0

EH6AB9+kIuenH4MtlbriMI0/IJkodJXKoo8lxM/cMf
ABEjgZYD6j9dbzfoaDF68rC0aweynr4MfUvYop9Pw8syOVnM+RjT69H+QXzoRerojQL0hnsq/7rkIJgq

S0OsXbpSDVHhvQixsKtOq64nz15o6syBD0bP9MdDuNpptQ5bWcN7odK1n2+JtGdD7vmI6TzcK1GSJYum

3zABhk/TVGQkNtED39UqcT/5bkVnMfEDLJE5uh1Bm2
pod7nsDCG71Tbmxti5JhmkoUpFM5Wg9lkrfpXvOvkWkDuYdy378N6Z2rTEKfPye0F8l3kuyH3Ug8t0t9

U5JCkTkAPlUKkgEMLR9p7orHSNcqK7k97F8U0+73Geh6RZo8c9R55VPyUkuUg6hvWZ3MHhFLPdRZaqjQ

bnLgbqcvc2zH4Se+a6Lvw3Vd8PAAcFIcKEjBthKX40
d3v4Gyub7/Le/aEph8gl6Sw9jvwt5TBhsyHlga4Tz3Wjd4yvsED266NEJGWFYskURXw4BFmycXukGRWF

78q1cJtn81jPF+SC5JlGaBK75RNUfG6QapuJjYSHuM4O2w800hZZsZ2VnctAX8xEGo5+AGWX8ALxPQ8a

Dgc13RWnNkmkb+aLg/x4IJb0aFqqE8Uaz90tliNWlh
gA5hP5b/bwAoUrxbIhmX07zi1/tmqf9RqBCVOV78ax+/Z8aiiwfBVuF5Bk5gbyojfZF9xhNKC3y00QJw

I8RloaN5Yi3tJjLsbnBp5n1BTS+mnm0Z97kJW0SQymGo4aZ88GM1Bnjwh1s7W5eaRy5VQJrjuxy0gVaE

khUnckW8lnK2kCN9DpPbM5DdP22ndtcknikG/jI3rj
01idTNblvohI2/m2ajfI5WbnNWgfGwgcgb9ft+uW0c5psVx9S7ArWDoWqeTI1sxYKni/8EGJZY8hxmyX

ChOnFJwqPmldNg27NPnCXZWAX1qHjgrO7VaF9WGQkYsJDUgjQgbKRqpc8zOVSnvE0852yikdf+96NtnK

kE3+epk3pEsixl5WOQa5JrMdiHKbtLHaKM3QC494h8
OK2q9zUeYX/hp7ySCha2JCUfb+jdtl1RdbzvoQ4GrYbghnP2hpco7kEr3Doro739lTqkZxOs81TsHR8t

1QJhxiWGdNIKIynE9tYu2oBBa5PgUffpwK1s1UqKcK+/9h1O7FjdJ2Na8Wkifhrg62lGOQRBpBXFE1hy

LNIjahSJsfJ+weoA7wKB4yOSAMA57sBttNqedH+GzD
EONWSFYiwLgxI1q6CyDKrvAPZhFux1h3O09MCZ9GpJfaXVTfYeJFp+9dDSyyN45nHYrF7xsGIuztmJQT

tSST2Do1Q54Lpw30XqIQM13isxjXlUZ6GGvT5+LOyLJQTmEzUhShdvcv4X1eyDA89FyMIaUmcUmg0HTL

rVAVSw3hUmAY89CnK1a0xdnNZmgqose0ixW5AFEDPy
+78SMhT9AP4/5b7aRm7fTcdeWQyQRQIdaxFWxgwTJnQC+4j/caMeTbPAHkaGJwPpqj1HyKIgG1/k1Vjz

vL3cZiW9zvzjiySKlFGjko51KdNjX+T9B4HRNzqbq7loljCR3LybmvjkNDc0Q7F654WoeuC8fzuz+VYR

BikSwy+KfS8D+IdPh/DSK0LwXFR9h+DPhws6Eo1hKf
2drWwPTaZnkko4lf/26Kro5GjptSDerpLzEKcCZb4/dlAVtonfp51EmqFrdK3q9pzPRz7NKa7dCtpfMq

vDnt0q/PWumUFdl/M8kcSYnG/zBhR0ZquEM1zUUJi3B9zMk3oWhv3lsAYZgQJztMjqwiIgtrM60qJR9O

AUkwwV7ubGjetrjSckuUnvzwwAhQMDGvE1N60xo7q6
kXbxLIi7ygXwvfQDmnLJ0SEhBMiBGQBiLSRX4Ot2zwsx8rVXfG2cnE5D/HjixD232Z6e0Q+uXU7uute7

rXh9LM2a4jhqkvR5BIU9tcQyZzG4x/we0X1qiiUnFsEcnEbmdgIE12cW6NjKvNFmSgLoqGJ5vUklmvTQ

u6VPY9ZnonTU57N+NO7m4/i93IQnUWd23U/UXBH6Um
tSbD2JSair0h+BjXtfNJyHsVqt+NXb5ICwW0ddbidbFLW0jVXU4Wpbooh7/isosHtbCIUp6ukLWiK215

T1gYs6PqDLDpPkDoqxJxrwnfFkwlBS0mdZXzsnK4yASVejAIpLEJKaTEkToPK+53Am/7J/gs84UF08UB

wGSkwmROs+k2uHCnDzCumVgfxX7OwayluOp9qxKe+P
cnLQ5nIkctiuXWfnyc8LtGcj4iw5J6Q4D2FnGmiL+qGpmNOuH8tHJvLr27xoAKc1P3/okGhuYnBfUCrF

euxIc71f0TZb0qYMhK0Z5CSBSiVOleUVWmvk+2QsgaaX5TaW+C1YYBM98HtoXx6hEhJ/1b0Ue/D+UHLA

6QEV5MySCF/vxrurefi6bWbvB84M/vOxPAclrYQPPN
OJWij889xiaCDo0nPmwFjI/7Q8x/40Ut3uTFpNg45YXHCW60M1nucbVUZPv0DwCQCZBnl5Nw3Q3W5MvL

EmJ2ybS0i2RPGa/bib2i1KKixqShKYfmn7z1tH65tq7y9OyBTPuvxUDeDdkPRmY7HhzJNBPkutsC40DL

YEkUrm7gwqBGI3iUiYOLa72lH5UNl0MaLGqHtBzr9e
3pyiiC0d9xeUIfpkwnAI9hYa6VJSlNU/Dh6Nu7sdnDt52KLd4lbxBUci83cF8qZdi8GbKm3Cb3Y36FW9

28HTqZ7hzb9YZo5vWklPvUoD3NaWUZV76T0X5ag+Ehwwn75es2TIs2jwwzLJ87bEnqz7bsC2PBgmZMOR

9dM2fkIundidX6sEKoB2AiB5wNbWpSR0EkndcTHyuY
JhzXr5W1f7HkxlJcMmNlZD/VGJsHM55csq/sO0NvNXfph7qmtuGv7opqJHywCuI/qjoryep/PKG/FScx

CdCliFturSq8czPzdyEHgwoszYm3W12d2N3svYa5RdtD8/tuwuyP7yFM7YBL9pX9DV2k2DkuxNxGghN/

CpJu6+V2ngtOpBYZgs39AKzpv1npPUjTogL4dlylAi
oORkiVr2x1oJ/Nl9mgT9d/sm9SKysYouAQ8Fga2zy/1/VrKWdd3NRB4nbE69I8JjN1PNFePMtMJK4tmP

MqHk6umyaIgz0wN9jg9cGZ2rd49Us9mntUsjIQb7vS59hA3bp4/lc5Qbmc5xHtdzGsgRfP2w+UTWyk9O

TSv/zBWZr9gR21mtSMXkDuPgxiwzR+dNT8U9S/9phD
T+0RGccb2nUOt4RUOG8eVkvWsBuwgvAPs6nPkqG7dY9VS6z0D/7EkGTIuqOZoTuPydCD3NwagURGtrku

uSIgsQYSYJIwq5bzbJ6814sluDA95IgAy7CwjiTXi/SOsG0hbma4v0iXdijHn0P7gPUl0m1x5YM6W0oX

Oz/bvKcxp7Wt2iBibNPMj3W9zid5FL6gVSITVLiEBD
jZ59cYpfGo8KkCAInKLmgtiyDna1z/tohvqhOi/nwQAqnwsoamMb9+KhRt0a3H0d90o1Fkt1YeVXO/OZ

zKDPbCZtmKUcqG6LEhIQHC/rSVIYFHY70TkA5g2eCAgiZM+/JdPjEI0IM3uCMhzsBTozyUw1YxVoWDYG

8V8hTQNwF/iTINLKfQ1sItW6E2na9Ps/EyyyT5CxbS
J8PMBAzbkSThhZMOGFH02MqlDixQVvCdVK8je4ZX6bTrDB6F3lViHlJllZH5GvaeDRt8Yb4q5H3xSEJc

NjSn0ht7ne4QHe8QQcE9zXlViNOfHIjxJid2ycxs/pewojNtKNsL23ZJcLqsH3By8n5Vf2j9puOXAQgM

MQoVYR9M/s/RxcWbR2+lDNRcy52gW4r927O5YoWmaF
TBCv+mnb1huUPYlG18a9cJ3+wxrj/JIKVOh36BTf8/329D0lEsOobbpVjprmwlUcyvWu5QQz+a1ZftfK

nSgM5KUXfxQ+NGbt41asGBogqcUQATafjQ6+A7iKbfgOCD+tiBoJFQEmHcf3gtii8kFWRJ9fWsUhL/oo

zt4dZdBRpZxE9eAXg8y2O2FDlfj+CePxGr73unvSkX
v5TCNRQoYFrroes6kplKou7XHPHpfbNjKbntR8iVvwKkljnYGRl8hhsUXy+qOdZ5AvImg+0sDz200OEW

mjL08FgA714j8qG63YUlKs6Sw8YN1R7tCGN9npCu3gFPQAnKexJ1n5lDEFtW2tVVXeeoJMpT9yuRphpl

SLOazwrn8mIvOYUTIG5ExTJdVYhuOhLm0h/VdK3p6N
Y+WWrs+GZINdOr9MG00vgyzyt3Uvcrm07JybIxN+G8l28g7OYhno7FjMUsZwAnj64Oxi+pKnnEZL74z2

exzMbQXfZAqEBvpQv07NZmGwBb6MIJqTVx16fQqx9XVH+MJGYjCxfdK5B70I8gc4dSf6Sq6fELwkauGh

5pHczU237r62x+adwG4Js9VERpSFW7m1ALk/+0h2xX
snC++FacvRqHhU5HY1u6EsHYlbqebPeEyGhbNcDD0PgIRDk091d7AC4XDLXm9jaYh8ZVRLrMGwWVgzxW

WyeRX2s98l/K3b4JBqyqZ42oiFDmHsxeBBob532HZvN/FeHVMi1yBCaHznszXSZXDbJPX4py1FzKIo8V

YejvR1yR5UwJpx8CqgyhDMC9AAihn+YMF94yms5zUr
2eO7DLl0keK5tB6tK4RIQiJIeoC4Vhbz3L2D9DAj7nPCZVOLu8Mfh/WdtIBScol3ac2kzARwCWIEpjhR

5Qpz97N8hp/r+/3Z4In3jy6RmTnK8dKj7OnPX0v5BA6Alm5MHaYK1EKpz/lWP/Ifllc728uvevz6+6zX

AMUzmCeGr1pNojsxXs/oofj9AuanQcHXKgsJlT7HdY
t3mIwthie2iT4Oe+9p4lSJ3eJ+EjTNluTC0TguKQSC+KdHIwAoi1rjOrmO94MemZp2RUzzZNZ9vWnEMi

3ccrd6cnGLSym8a9NjZqZKHaZ9VkDVJDuXiAQTzWynNUD3dUI5dkWTv3eCaUCXIUYsX3dLWqCzrGylex

IMWaIG3tW4a2kj+Vlu/qRa/7rXVT6+luWoWb3pybv7
Nx+UhfmtaE7YjxauYniiFZl0TTB8E7V+lpyI0tLjYCq7xI3iXZrrEoLBfXh6S8f9dQA2r39ZNFajUA6c

+TUeKU0cWJv6z8gHomOrHmhOGj+9zt1pp5XQpBAC6YZZVZ6/BkGbis7KXLLgVzi12ZCIQA0lgWp52nN8

KSLZeP4B7RkZFb38grv3l4Z5RLHciG09jbHycb655u
3ltG7gmS6hsfspU3X+NMU7GNmYFCna86iflXq1DaHSZTBZGWC0eEmvLnK9QEG0DpTmSmMQ4Z0gbhrCk1

YeK3xD//kfNVwbsN8ntZxA2FZJWI+qE94bA6ojJfWQ18IbVWglxB62fwgZHFoDfzLeFqogHS8mk+OVRX

a2fD6QTmkvolNtb6uRhE0MwSeEB5r62bCmx5Ahnih1
KnAGsrac8s+8O+GyGB/t0YI2ECnEKcmqcAcbxnAYntC9UoXtPEkPYbnNwY2jDTJg1lel0CzYjLWs/DEG

LcsuSX77GzznX/Wf6EQu9ie10VyQIbHDXEY/eINc5Ft1m0PKGOsg1wPWT8Bac15ah6KnRMyTQ1UeqRBw

kx+NmcZnL+dkbd3nrXWXEWhAWLDLpn+l1XBy0jkjFj
g15PHXcxj0m29Z/ijlbQjmptJW57A7ga2ONIHeBHr/FN4vyffhbF7S+2BBsoW+F6H2RZRCaxnY8pC5ND

nyc18NHx/Vsv8H4mlUY6uwarGdXHqLo8Gdo4tMX9ebshCX7J2naoswz09dxHXZCd3l5d0iaceFpweHwX

DRU755Wn+rpIgaB73QSrtP3RNwNSiVSpsTU2Zg+Rka
Bi+yxOZ6BhsnRFPA/idP+rShF8xwKRWZQOMW16QGQPAPKz7gT0xFJ60vuDWDPx5D8zSDlW7WFXtDUNSd

AaTvpomtUZQ5l7u+qyaex/UNd5ugkeaux+b+jF8kLA9bLx6FyQNrRnr3wmLdqgjukJfd+AEAdhWnIjXx

ryt1N2GaCiSaYguTwa5ftThTJZX8rPmIKy+1W3zS+D
fj/9EVWxyO27iHcUhDZpxqxR3TJ1b94WO6Jcxs1vPkjswMNR5vHFLysTS1+tJn6z5tMoewXQfZp03tky

9HVkTilX4xuyvNPWEJcYr/A6XLbM6hn/deZm8B1djyg8HxZyMoRwAxd3SuhiVIp6Jp34ylX/1K8eRIZ2

KRdAw69KqXGxY9opmMMmsXzarDpppqf8WpXSzUS3p/
hxUTAzEzojmDybB/a3xlWI/1C/pIRWXM528r+sahHx0dpuSw1nieukvFoiWVXl4HRQQw5h12Sgl0EUjI

CealFaX/wD4YXsJn2B/w9R8Ns/WSPelIllsaeC17EC44SpX484lK/KLV2BhqJso/GjhLnnQieQRwAkeH

Q0mbjiv3etxsR7/6xUdDfAFF4ynYt5rXbI4RBtqtKI
JWc7DWFWqqH+ltsoJHDAHOGEycWzDdWLsjtUU4kZdBCTM4Dv5bE98VsJ6EGT8y86d0Wb+r7l4YQAQfvJ

VGCZkmirFW5fq3ZGSFdbmqD4oSOmCtHelB66hH7jYI+RPR0oskRw8Pf4GR4LF5HpmA7fs/v05X4q5Kyp

8Iq8RctTlj0+dHMI+QsRzIXaNf7Y4MC14+rQxXe17W
HcpO1O4tEat+4ov5IoIJoRE5dVYUvF/EPm0LGw7WNcRpqiU3UlSyFeHejL1enu3GzcTG8EbizaJP7SuZ

jM5b2/uW5aEWAdN684/aeD47+4BaNe/6E1I7nkmaznoJckPnyConlSG+ToHgb3oqz8dK30SnThOOeqta

hMFVg06I2OCPuJOHFCI2dqcfHL/ERiH4yyo71NqMyR
4F2jzQ5GIVKZMSIkjpHn/+iZGMMARN6fze1l50SuDLB0ICbXV6Rnyz6t/bJKKW0L6AEGN5qEaeLXMIVR

QDUlaH6CycRw4tQ5zHuQsvHH5MI1omUuobb0zvN7LpCQHJH7rqZUpRV0KST+dboZvGLV7YjONe6IX0jx

1Jvp0DYeDoS/8A4P3ubjunG72iNqodW7WbcOzD9Hp7
ktxf3H1ewHHdLhzG1Ta/Ih2owOYQdCxTMsj3hr7HTsHd93LyvwbRDMh/6hvda1mhzSSuBQAcs/02S87+

KZeOwQvPWbsw+s1tyxhvAWrC7QSkivyrh6d6ilshmajeNmkTyZbojKm/FAKXXUSQWxGlWECrfaUHORfr

vQTPyh4HI8t+TvtAnrkl3WOAJJKw+vKvNSAP0J7cpj
n5BDa3vfe1nTt3E81Li5NHNvZFbTpUlOXaDcPLeaplpik+sYK8trIGIbnqEF3R7eusx0WMYz2IshVaG+

GFZo3ci+4iAy/1/SR21avP+jRrwbKA083CxHwxJPHid+fg/Lo1pRyl7/n2nOlK1+b4dFYHhSaTQVOxTg

6c1YfrPKS7rr3t5N7/SFJCzpBVvPfV+lf3Sh4LwB/E
jFAW+T+3g4MFqBgYSoeBBFuAHi4G4ffoJIzkE2FpEnOQqmy3FdXbobTA8+mrqzEIJ48FP5aQlNBB9gNR

kGXUzQhnQhCCA9g2jFZIZhjAB+X7/gsSI/JQZ8soLEaG6KwAHLfUDas4w9qw2WOcNO+2/SYyJ8Dj1YqZ

0F9ZKRy+8+WI2SnMSGrxjntjnssYZknfLhixDT1DGo
OR/temUkErpSlL1jOnm1bAd+3O705DDzctYMjJzbDl5B4I5XocZmXxdvK/XL2dkOQH9qnicY/0NYtiea

2cQZpOE5CgVw+LUi1/EsGRDLgaOhIpTFHeGHjqGFQuEgvzesv901ZXsQofzcPdNpljeXO5DihhH2XSGw

6+qSg24dv+jWk/j7VsK9KJ1A6CVmwdaR/Vzq9Mh4eX
9H3SOQfGYxvhE5dzzT/1XSKizaIjRFhZ5I2Hr8TaCmEGfvBRHc+uIE+KMewxMSGj1ZXrdOFnqiqC+tpX

rfc0fjHHyuGkpu3pACbyd1J68pSVir63cIgW1RdgpM7EGR+0fub85bHurx7xD+vnrDvYv/1n+qfIOq1d

sOKIVP4EXA6FP2WmVyXwfNZVeRp2UiXBTM3adqcSQ4
+Ba2aQjPOHuPLU6u6YpufT+AXvNnuE7Fzg1LNB53z+GJ0Ys+PFSvJUWOjbALgfCJHk/bwlNoXd478AAD

Gbw5wBDRUe6nu4/sEWFD6GBBs2xic18l+SKnY0FoSmrKDzKbwFZO0N/Oj0gkoUCGmWDWMtnY+vpWboaF

KOqREMW4XuILdImc6z2a8bQzelsbqR++ZR/KpR7Zu4
BL8Dl+MXByyTqfu5cTVHoEjygeWnQB/0LOtmSSHom5yvw0g88Jf8ptI8KwanmoTbeRSenNCWyJrpNWgk

V6UJIsZf9yVqNRhuyg/akblVpJZMe92CzDpztRL5i4CM2La6ZJk2m8Q37jOtH00hh3F7JCss1DdQkhff

zdDUifM76oP2p7142JAwLDFEkPvPaPU1A9ApzBbpbc
u8q6oJFHMY3j710BnmuJdijGc6crqSI5GYM3Naj3as2QWxt8ga2Bv/ypi2yAx8kTKiwKGna5hHwbKdBw

jvLktFigpQednduiif9jGqK/+lGGESvFQhRPtitcBSIhw9Dtt3r7+bxfLnojpUL6h+gifjvVZwQgCY1D

KhWzp8weRnxo5QPcBSUxSD+uLCQLHIMRd89u7sx14o
OosgObAsU2kIcuZZ5/WoRKqgku55TbeaTpADDr176N9Njg/c0u5fNrvUlLpIUk/nr/HBvG+hD6a0UcB0

+wP9w9wSw9+I5fYgYd1/bL6VdL/FLVcssmPOAy9DiwyjcbGv4Sc8wj0Fyi9rk/JiKJxFiirrkRD8zMyI

Ckid9SlYG1+K+5kmJcgIBrrVubUJruhtMgOFshJn7y
E20u4u8o094bJauOZUUJ2siyGWI472J5TAgNstUjg70bSJrvGsaDTiXq4feiWn9L2vltqVFLgc2+7IZT

AC/cm3DhqjQiHXAsuTjME9JX4I1qhDjJdD03+QjlQ2uxRtyXldEOnsKu6O1hTv+ZTRgwfNuBP1b+lbpY

YvJUk6M17EMpJLEbG+2WiL3L9b+CjEBW6N5EBRL9ub
9bpgrqUcmqs7GSV7zMPq5BGWDbn4Bck/DDe27zdieatonyUVAoybaBHoHfLlPBXsw4QY/+eVQmdj/gTP

tdpxBxM1tC1UWWywSpe5CQX+iNeqjn2ObTWz5df5ylMSk6eslJPNKzAgxekZ3WAVmHS/Bup+RG/7asrm

85m4BX+4Ip0QoV+5g57WFMgT/eQWg8D1nws0bduEHA
AqzCyXDerdMXw6aD6Y/vpM4ZKGExjq2JvgFGGlPr3VTb9WnfsICbCnp15+Y0plxi3Cav0aJNl33fL9sh

rhjI/8klDrXhQznngVsNPuz9YZGU/RTd96vb/BeQbBzal3x0ciX6LHDrnwDGD1+1nlSd6Z3bD/[employer]/VX

HqhcWoxfrpTR4DJHUVSqT4q2sG43CyTlm8BvcpvUul
VsiRfEuPp8qltAb6OraNyCaterS/j4LlifY3iX+fZKujVEB7lXyC/Mk43myX4GO0F7WleLqaj0qqxvgA

ZRpPoquXlxYPrjyTtiPhH6+CkTFOepfyjuTSwIPun4NbPxsJm0wyit4VfxPLUtppRvk9sjFuioh1f6I4

M2OBc9iDwLRTkZRNixy7qnxywLMznQypJpeaxCjJMM
c7ajweuo796rkTmnnL/FcpigOS77cIxT5oECegz+ce6HbsB3z5Yawv8aWzphBSWSKPrSlhWfyUv64OAg

EN9hicN8n2Q6FCUM93oofwFlgYNSHQHIt9NAoYF0v/urpXfD9MQ9Jm7p2x71PNu+V9Q9esJJdpotAo50

UGq4NiEnjtMhIZCFRWML+kM8nlrdtdyg/I/VrJYvri
ajiUUd+HBJRzijzr96UW2FR5dW+netvfAotZ6U4IPF6XYzvAPcOSgkI8SkJzpc/jNiNO8Nf+MlDxb5cu

NKWhBhPQu177rpjxgr5S/POJjrp5xtJ6Kvk49Zd/qxCYZnETeLOrWMNgYki9514lpBa4mcW6dj/3V8dc

38tKf4lXDLbimhr1Ea+4qg9gbE+EnNn7EjhDMR8pTJ
IrggyVYspjfBfqJle9PSaauLz+7OS9DEG8vkL/QwyHjgM7jUQy3XuDqcViECQU0cyqD2F68QhbTv52a5

y2OFWAmSKk6wg9HdeamQRtskS1k2qe8mZhb9A6IEo1CzJf+WsifILHo0vwTkFXU8MLZ74qq41xf/e3Vy

iSv7s6EqGvgRBc9JHFZ5Qg4SUI9mGo31wRQdtjHBcM
nObyJzlbqJn+SluYuifv+vYU/brLBnX06zgfNrj0JBcani8MAImd6SkJgNhpPbRXl+0M0HelMRmn/Ldc

QVhpLIt8yWlAcMrHX8YbpWQ7tabBGe9Ax8E9ypUanPNXPf8XQ3cplCl9h0VqvZaTFJaTOgeKOcWHsH6j

5nihlwSNe5A+jxHpBRPSTlA1RTL0RX+ZRZalS746um
wbYLMLoVC83CvtJxv2WoMrg5fGXVZ/pdKQBwq27Wt32cITHe3qWXxPRApzLhCwyM3f4KfFXgT3D07eC8

EU9ZDWe7vKEjG8QT1/I0/IfwBTnS3jSiwRYFOP/D+xSAhRh1y8vfuXdYKNNNT6LpViOcyO8t/nwOTiCM

FNHs7yDPPBNr/GONV0DcLSBugAUfxqh988lQwoCiPv
9s7SFTpsvi//DrdUzMV9MT1Ve7XTOsRoDlcCrnG4i+eGka6LQbyb21BeUI36xwl72mWIWf472SF+ec7I

OtZPCIuq8np1496vd36iWA5lfIZFndr6iB5iuCN8l9GV+TZNSg59kvDr4BoNzhDFMdxeSFBBs4jRp7AT

a5aye0FZcJzdcGLodLTngfD4RLQJaAV/HZbPQrdwWb
eq702GP+qYttNrLHBIwardWKnKIAZhLl+8Ohd1rweefdhQUA8IO9MK5t9nTBnaWm736MZ6AzFoMEhSAv

XEDWsGnLvxQv1mmreNeF4yLY2EapKxtvp1akuve7JhwYft48ytoBQMcLvZWKgWcUgrUuptmttfkitWPq

RY5L5c8b82bt/5SDUVA31mNo3He/X7SmVngQ8nwCT/
n9fTsgoXV6ZELkm5np9KxOIbqLn6Wkqyx/N6Tc1YLPg7V6PPSGIqM/0O3g9e2YtpRC7lwKQQUyiSF25s

IA0lJl2szrK3xFD5wjPrGrrI9BfqfDaQ68Gots6kPHAoa5e2251iVDQlVWHzD4UUMy0IpFjrZiiJ0lSh

iF6trtf3PTTtV+jFNThRjcyE/gJyHRB7a+2XoKpMJR
Yj3pnS2k+YWW2Zvfkc/kN/Dfqb/6R6pv6zgc+dVSakcyjyMhk8kY7xf152OdN+ilT9CUgSYqi1jgYnwY

KMgp5J41zLnNihTAbE5WKPq6Hs9PfcRiV/IewNfTM303z/D9cv6H9xMbg2//jeEn9l8umSitbElTZCF0

0mcOiB7v8Zbw8C27MVbQ1POoSJGTIomf3I0+tZOz+/
xKIIznt4uVq1+MBFY3BY2M0UQPU4o88sdSN3U2/wDY/TGli9KHhPhY3V+pmL9uUkRc/9qeXY2tNvfF7q

1y60NS05F72aku3+v6taI4LdQoxvA0ArU+M5nXZBW42i/+Kd52BFVeCq9YMJRYPNi8pkb8OZxrEqeAoo

nVWcZt7zA/rbZd8FW5XhDLLxGduOaPYGBmy0ekeaqg
swsol96+7vTkZuKMo/FB11jIMkBjCKS4oskEcnzbRAN/hj7kR4jxvZ9Q37JHi3Lxzz+6c/TesUXQhrWo

kX9tT9yyHNs84PFygRExyTaPsob4K854hPIZCOjz5WJqaVZuwSAHrgdPAPxQyI48q8qo8whOVU4KqArs

tmMA13CtiWr9heoGD9Vh0CX1Z2wqlhdzOpKA0R0o/1
wypa2kacEZ1AQ4bXKj9RpoML2gK6BQBoBXzGJhAhBh+IbEIpm22qzivpOgur481u4bssn3dGLGtoy4Fs

0htpqJ2kTO3jHaL+UeWGcFL0j+kTSU2oCgYrR6/56TCcLwqPcKfccEIbAj0+FwQ59kLOpYH2Ea+HSgCj

JtE3zTT+tsayxtPQBz41XESBMROs3Aa5XjmEUgneX0
+YRzYGvQKNCQNam0b8ogIVsFd6tamAT15Xr2NOZpYZbJkf1Y3g4zcrFrtkeHf+2QSVM8GSvI0DT0b9op

tDHayqrxGD3uRZoxOyW6AZcRvS81L119RhE3FYbvQ770oJgdko9xcjrzCU4jP2vP7DuR72rFPqzcCr+m

Thi4Lvtt9e4P6872JDnvHZEDYy84DWyvumDmfTS6MH
cs145j74OG3VwbPOeZ4ab1/i/s/eFQSph0j93POX9d8Tz2nUO4Rut3IanmWynDwozXaw+plMcjwNRNSH

fMJJC/KtGqCuPX5eHgv+OjJdET/chnSmqPu3x/Gy+XFuRtGIvxytoweG1YD57GqZECoqJpjpELk+hW2w

R7+RceWB7olh6dMdN2Xu2KSih1DQAyYdtvMwOKvHjK
fTw1iarf5nJllxhgIBRvSX2Si7YpJAIpIQx5Fx3WKfpe9dj3qDcxNM6ilHmuo7om7QG+eU1pmxhZT+xP

7I4zYhV2UBmmygdhUKejjwq164EsjWIaGib3/EkVlmF84Aia7TS0ErVPWy6f9U2WxwDRIOfs+lzoQDdw

BX5YP3iF18LvCgfkvJze6jUl/kLEHfbT8l+0+AGN7f
P4Xf6+efXTJB+g2beuxj44Cv/UQFIOGRMlj7s9eBGx60A2oZKIJmK/NH3cNONfJ+90//1XXOCwwP4dxr

WMCkdXNmjaM8in06GJXYwJyLUpwl4R4oroBZpeUTcyAnn3F9jHM8z3ArSo5MxAi0WxMHyGh4Bs4cdJYx

2RE4uuWW+q42AT3i8tXV59vxb9ONIAUl/mucNA6LcM
/xRtJmqUhNRYIza6uihXR5Z9kXT1rKNOEnGUQ641b0+3axCNm5Hf3lVE+4w7Qz4csprb4Irm/RWnWkDr

Rpx+Y1VyU2j9b9Go2XYSxT5Efi2wXEWK4OFtooHsG79NnzfStO0NcXZlSmM3k4qqSdlBLyBLMf8LeT5K

TANC0BEuzmzrzoH3JTqxtl4sKUay3a6fSAidsayDLt
DQoM7Mr9UGnV1rpObENDkdmWMolkXeRGJ0wQqMEUFk6eokgpGpViZuG59hLI6Fe+yMUVKJ5ehHXVDr7T

suThgvcbQZFCsOhcWoJDScjXBTo4XN1KvMrLDLOTPM7JyMzDQGAqYEvwxog4Y3pu/2ZV/aJobAqtxQ1Z

VXn/6d/6nu/g7J50/aSTQA3oumwSWo1z3Fa/ZCs/gx
O5zeowNeLMZgrPR2v6tmFi4NdzKKw3Ab8Sz+CwK5czuWN8krMkQged5oWKEfF6gl11bR+jRyd5uBNHb5

jw8cKeUVYoyeAZxmF9C8XOw6KkvE3CgcfQTnf8GyAja+SjWMgC/magXUwAdCUCR1fssqkhndc/fSOLI/

MuKjI5aATWCpiSpLjmRjfXAHFDTxj7Hr+YQ5R7zxA1
zOiImHjPdMKZEJjpzps7qvnX3jjju8MQjp7c82nN1RjJ2jbcmX/zFBte7tRH58TZBzxAu1DPC+IRJMsI

y12xT/cixiA2ivj+NzDq0IcKhOkFuPZBbVq+v2O9lATmqzcRT+z+aYxJXz25atu/IvUQIwUctycPrbhV

IfwUL0OqGI8t+sA/XvawWUEz8URxGG+3BAsbCSe7be
DPE/mbyfvTLYRRHBMI6CXgjZ/C4DVHAUX50FItgyWEYtKcnCiIR9tswQFAJqIg5Xwll+FJte6UBuveX8

hXDd16xobSjD/7NEmdJ8mbdzsKpN7Te8mzzpRcqPodTeTc8hAEa8EKNeb1AX356uXRTde04iDQt0RWDg

g4km1ZMyqMveMh6N0PnufhPjoV+BDAt5prVzXNg1A6
GkvC0Ne3UFoS3QjbYT8rpNB41T/g3Yo6mUEGkFN7s6BfV4IyT9yiENaLdPHJ2CCDmJ4SOnAYv1OXzpPP

j5IeQj7iSNFoav5MGD+rhAlr9k5kzAifPLnvlWxma4WcJBq37bBtSzhwkSElUJ93Sx/TIFVX9Y2yj74L

TDBMUiTVP66w9LOEOwFPdkN0T6nO/BdRyDsuidOE2N
Z7wi824Mrm5bk7tkTtauM4GaSYvObh/huM7lOtzGpia95R5lFl437n6KnXxf/qt556EqTyDMdpswxFls

cS79zuShdKpZQ4n95BNm2lU7tPmvtoXgMIUhs9/uYY1U0U5UI0Q0jR6rgwfgAixisow2i4N65xOkMAdl

G3mpuMxom88HUSTVWn2F+Qgmqwe/rwluQ/aY/ybJkq
5WtGXOL4KC1AvyC5nqp73EYZSFOm0csIGEQKOEawKhuFp+iAiGRGndHwrg75IC35oLVUn7xntMQ8BmAj

5polBoH04BQYHDM35o3VtgDOzeqKaKNv8DYd6LdiKinNzIJqZG/rI3VXW6KMXijrU7EAUz/DLunJjtFk

38pvxuf+WVOmahtLEtWRpFAix8R3HEuWNXME1qE27A
CB6UoJpei/iULUr9SP2OUgsu5YmiDaW8TNi5mcKHOcolDFnI8i3oyTAbduER7G5mFwRNJPOAYl2RIq6N

0+8l+R3ZWzIJzp0r42CXnVXBLqJto/fh+0sspxJWbpzl03cqIUIN+ejO0h+S14Fe01GYT33qMTqNlJ2J

iOFRNZBsoUIaAH16LoroQRDRQfUYPBOwsJMKSpf9GQ
V7WwucAZJnDi/IgxM/xtE8cJG/Ho+btpfN3KDzv/R6tBGnLnSsSuVL1FYuqVOgiNI0t+ryVo6rcsfzkI

oc3fLQaXMJUDFml5seIUg6dmLFATYDGAAD816tCKhrb/ikZLht9CqiLR9Dgm+1BI2eeSvZM0e6AKGrYp

10zF/OB1p3IE4I0n/WJj3U/MVvlAvesmrTpvhoDxfo
XWaUNl+oOTvg+RBDlTmtlzyuK/baN8jKig9E3HyRpAGkTCvDfodE/w4UASG6QONTatXolFTmBifFeECX

MVzGo3aPiytHM2qfj/yHXFtqHp4XRVl4qv7/0lSIMI2i24Hm2q+gWT86B6JFfxZS2oLmb+DaUmjZy3iB

x8J1Hi6Xroz6rVovc32l+es9YMAmBdkewz01HGuq9v
NmmN7VwKOS2i2MMQlHXxKtmP7rs/AfOdWUNuXYhTyx/FYEsNXxZTgTeTyPsYthR8/sxqRVyV+klVDGug

H7QtFi0EKsH0y0+tbKYIvumrvzLgSN/7C8Bl70ZUzybnU0mbwc18F5RIXHKgqIEMweHPDa/bjorjAAuA

rzjW57yarmyqtElzOL8NV28reLDEwIz4KUnb/7VFqe
w5CGNFzMBiTvQ5gziK0sNuu+9nBABrPYND8wP7dhHRl46uyj2kkkx/OrKC6Et4y8kQcU0RxlLLzYY3t7

QvJbY1i+uAcMLSjuathkQsHBx3zFh6PUg7zXVP5vMMBk8nmy7j0sDvlsKY0xJqgsfFQrbTERYgtGOSvX

hM49q2y9ALhY0d76J+qnm0SEeN2HrVp1PeZWfro5Dn
lEckfMuzKm6072YRxb6q9oOeJhe9OiOC14pBGYS0HC6OT5J4AL5SW5MjMN+DWu2kzbwG/6rqs8aMKR/G

5VPu1zg9bluxDsTt/abzrJ92BeCmfGh67vX+6kORAa43CfCaGrQvWTI2cg2E/PWH1R+RB4Xlbu6KuvoT

ZMIA6hlD7LVyZ8hWkH2GRmNHdZwjh26RCloJHpdr8K
NEh2CpOolZ1x6DVLLvul+/oPwqn5rOmqbKin0VopfW7nytJLL4hUfSpkF7aB2SvrqyZ6lYiS7B+f7/NE

3PlCAQZ2rpbsnU8coEBRxCGB26EoxuVFEFB8ghjnrYDdpX7QaHc5Mzp6kHpNAPCHCSIMESAeUWls+bVG

NytKRK+AIYYgIZ/3Nw+l2zVxdgwmVGx32vyqcUnlFX
WHd0f032sxw5BwwyeuvRy/jZ+ptMXtDhg+yBT5r3ROsYjn7F30Sxgrl28hHP5wgmBV7+MZ+qC5ciF6Lw

vk4y4Dq4uLO9loYpgvaDUAb0BTMSL87V9Qb1cg5wxn7kSSx26jzGubLv15wPqV+OqgpIGlUzZ6aJ/Yi2

jibi97sH/JNkawypwb4fTZ5e28YLTRA3TlqmsbaUzu
iYgYrzgT9LnCXbjqbnKU8ubaagp7RZhDEtDYtm/ZKKOzEPQCGw/emEr1jj5vMsaLKpCKMPmKmGYqiu9Z

cXC2PqhmNshQDmT4l1edyZ/AMlaRrjdIFbm9u8Bqtf9Gwcg3wFlCiQhGfCKhZQb6ledeYkCNY60YmBpf

oRlb06CSdy25q0e6K8Y6YZNuP1j+2Ylj+tFChqOftR
enqRM+d2o8EGWRnDtHzKYjrDky4lRJv+GdEJAlL/8k65QQ3cB2oBNGH9MzUT/dfNtrnZK3m7AGSv3qXI

mq7CWbV2hk8XQo18ukXE28/4eeqbSAEcRolU39Tt0MvRkh9xp9Am8RE26d6L+9Rn+o2Ivz+WxbrzC6MD

3BYiVQ22a3LyjGX6+lh9QtSKxcC5h0GHdJCuDVUbzC
UDLijoi8+ZhGlC1s4fEkyfc+ug+ipQfNEu2Urr4gmI7grJ06gz4RxublrhRQNp4ys0g3+Xx0miVyLy+l

wdIgohdruhv3Gz1JBSkWxOHc2du5URBaWXGmH/UzR8+vn+2Xcqfp87zbJOoFhs2f72yRnWGmFaaku1LR

aLCCzcwUtgDHyUjIE3u0BXftpnjlvPDCuqyHjazyRQ
kzEnsKW825jA8sUO7D/qkOySa4T+N04av8QiDAJFMPi8aNt10BbgdaGJw4ygfamMg1O1NAQVfzjWKe6l

aPwIX/tP2l+EQzV54JaSvmzDM04nzowfAY4SJVu0NTSasT4tyo9BJc1T27ybK/0LVfPjOzc0dGKfmJeJ

rGP/6k2MpocGOC12BR5b81sp8O4xi8bjOLHeQ8VoGx
MLn4n0xTsb3ebrvFfpYy9qDHbkaqHa7IivhVvrehx5JDLz7UMNaaeTXntV7o6JXx+lfELjfXYGVaGgHC

QMUG3DTqw01F4dPhupURuY1Cd5RuTR5u3cjF4tJqyBDw81P//aVT8Q1RsXHqJjDMcEonNGO+9ptnWEZg

42+PV7Mekao4dCi4tDhZusccrxqegba0+kF3kAY/tw
itgL437rYx2I6AuJmzT6bYoZldoqJ/nUsfTS3iUyiJtgytxl4acadTxq3PSMpRoAFbSNiCt72NkRMmjw

cE63yHEiFglgzZO+Rf3Dd5I8t9mQ0W5XL+lyRHFo2u/ttuXVD6TOr42R9XxTBIo6ChOuIz1whY5uArDD

+Jjd8euscNjmo55q4FqYleaha4+im3giXxHbZnVhNd
S30ZJSyt6CWk29O9hcV6l3MwTExXITkTQ0j+3vrg0G5UveVIGsiaDOV0E0Zamn7zdpLg+QyCUXxveKhW

rgN+/w9zxI2g+CKlqjPqRBdhqUvvTnUrzj4NWimYdlgJl3OrwGklLxChbvIg0gNCUtC06pSqmvy5RvV/

TOhC2ovgr2SNBrhFEU+o7khsbGyRyUk7z+EbAzUfpe
nnN3yY4gIjo3dMZxplcFqG1jYF/AFutVjBG+RzJeRNMyXZEkrizXBVf4SW9uOiwW0ooTqG6MpuY8CI2z

t43+HPI8tt/hUlzrEYDEncmKRwpZsMulBZxI7ngYYOpLF2vbMaLpgrxUWV3SX3bET7YHnir1IVrBlq8h

4k9mK2blwZM1nblAdOimmx4vuKnp9Bq5ZMp5A+x1q7
pWMisfgDIByfMDMDpU7SZ6uz8oEX0ee2Ds+P5nNas9HOniMxsZYg3OQ2XDxd/WZjiE1iAIqQcC6al6Mm

1J9Ea63m6EJeEGna40ywKTxyIgZTboaFNwPDe5wD8dlQc+TIfDyKxYZztzW1e4AwMcR56dlmfItbOVYe

WMCRZtMBenau/KVmwtnzQhJ2vT/dSE5qMgNjMxmH4I
cGlvWuc+qeBZjnDQB0s3ObMnezV2ulHEZ7ZZm6DHNOapBMT+Rl+47VCq9Of72z40q982aFt8XDKDw/3h

a2NifdmBeo4G9ygkitTmtDPzXCEoybuA0Y6gKbZcAiv4kGaaEVDaRKrbb/ubUb//JG7sOW+hQW7lQtge

qp1FHCNObQ/AKxCx525vj+SLWj20GEW76GZmdPtQdW
gOfsQ31MF3tuFmXiAlUUHYMWmHmTox8OGyJiDILqJbnE+Lz934NidoFKDM9/2U6ml+WsO5Mn+wj+svkh

QfkATKTITv8eraQtcHb5r2ldRqTx+Wj0ZAP/R7cPqsoJZhMGrtxDS1tF1OXABDbviyqDltd7+/TvsquP

xuDoYOuxmgm+3cPZhb7v8DuxRXjsh7Mg3wnJ8ebsJF
aPX6xxCYAx0hYZ6Nobg4ZgrHF/OyeRKGPbhpQRYMTpSfo+wr9xYzaXBJjZbokkwhVl78aKmlNJm0v/0N

E9f5q+FZ+GzX/o8HHeTVdigYijbsMsWYVPQiO6pLgTl47/Lu7V/RjWtiJkApFCVf80cRMJVhARb9WBgv

2yLERvCEzSTXFKRuoRS32R9xFLCE6BPnfLovplZNtx
vv0+xOpBiqdVZi/8JNWnMwK9B4eDAraS3XtgG+FXTgwBLwzn9YI8i4//DmPZLA5cXqX+CcNr2JT+ZobV

/ICp1V7Kd2r7SbEgedksJrXJCOBS/poVRVLCNuxYd+5MlmrMS/0zkvhZhpiy7U4qje6eV6Rxdu9c9aUb

4R3KWnvqN99AcH+1x5gl6uHg9Xoz27q121I6TrENdg
b271Ia3GTTEX4DdFJQOwAYSdFxytQIZrAGQFmuvSi8UEibclHunCFlm7wVUCtn4S1WKmyLSNnBWStMtL

lApNq590trMi12IH8eJpnRXvqjrQAQwxGgo4AyiuDEWCWfmd/rZzc/BKx241w3UFTGIb62vxcVEvQMi8

3bAgnliytREv34qhEwkbrk2mbyiUjeHfS7RaOAsyFd
Z5TjAPqLM5pyhMRqmxtCOOExOOjB0+8H6VEoaK1XyqCI3/6Rg9CWBalmg27W5XL96JMLo1rt1AvYtX4q

tHjrgKko+PaaZAzut/udFMjOYh2QsX7XYT8vXLGi7lk8Y59r5XiRl+x3/zlwc0hIwivehrv5u/rJC/v1

3104yjOuthlA5Dd9uItiXKOasVXarZV1tS6Emt0QBo
6Hs4RTsH9hJsBVq0Ei0bUep1iJ4iwthRbBYRT6o2KC1mW9JALebGggxFN4wtCqyQRPcPfmOW3qCH9iv9

bW5ICBj8I7K5xq5y4Mq7XzY1kHs+2PcCSBpdwPxglyH74w4QLrwa7yRq8Rk/27D+5w3WogeMIhFebktw

c/LUaYOmMNeURRCIhUccivwrxyOBlrD78Nrt1ZnZzo
STAtyy4z47KtTxTfnKkxIxpE0rEFddr6IEma/YduRGAD9AoWVfMB81+JBdwY/Cj+iWuYQ6rzIQWXhhaX

JSyb/3bMEkT189eCBe6GnYZwJFxbKuI4m32LzX4/CDq/2R1/n3dYiDdOArOzAv7fLM7x0sud7ylgZeeI

+pHirTb8iQE36G9r2msQnBvhCSGAbi7zgtp+/bTYVe
5mpXmZX2210/PyOG7RBq4Rmv1qx2+7q7A05hF54t4cNWqi0iISlUoREPDRSy7sm9W8ihjE88tXvibxEp

uJ2IgLNfqZ/XjnpKa9OCzt31PWy/uFwajGLVXNNW9f5Y11lex1Ul0OM5fsduhgHQ+zTF5wramu8ikK/r

2jXaUO0LVqLNFYPo5VPfci9t2nk/UjyULtrdKJ2o+Z
1cZ4InAcciHiRuA+CjCcTNHD+ZXCIQgZ04BECPeeew++VL5pBXBLd5wxmOIbvgeZGJb1RFu+rRUthxj8

QY1+py0YRYowmzM2SQEHMGbHHdi2vMhrFitx46W9zbJmscnvDgJOZ7dnIh2QdOXY1cO2eDvkqLXCHe31

IriFswIwxHK6gKKxNi4b9Dra9Al912Kw9Or2MkjQfY
sTcUbjVMMUa1QNDH44FswlQoMICPUrEwn+sqjcGzTnXyUSv5h5Cczq04C6hECFzkeM8K2fA6xEYuB/hm

SO5onZBdzzXmAutg9M/viaeQpT//33uf+J80tPlQH4KkoTymsF7s7meh4vhMdWSAt2vLaDLkr45AtIH+

9Zs15Ox4Xaw4ynADbc8F1JvEX77sofXfhJknQoJ0Sm
mkOH/Gk+6MrGqpqwJCxg0ZBQx62qeL8VYpKTjQn7CbC9JOXvyl9hynI6WJpLrrcy4gNIFrXcbEB9RAVI

kpaprbzYfPbV5aHE2hQv9n9o8DTt3bAlle90sGjwq0smhN+kwBmfSd+bmGDheU7u6q8OY3P276DtFka4

YVIQhs1nmWjfJSol3nnnHMFCQ1pQGyzG77IbkXEK8o
Ldr++iXlYehsf389G04YAXGhpYuku/hhXD6K53cm6XtPj4zvQDqscqg2TJkX0ACKFHOGgkqODatHEDvw

pY0F4TvmAYBOj/mk5lZZXO25fG1X+1uNgRE+24PneGyL2CmB52oa2bkS5NzPpsZxnpbkCPmDqaYwVqdZ

nmXxm1n5YwUxw02Kb1dLP2KxHuQ5O3c3qU1wueAVwB
GLEP3/VthBSU3OI09RxFuZeIZuMokqF1lnEDBMGXV4vw8/PVWYcRJSe9h3DgtDq5x5AfRjt4/PuR+E5k

RL19146qg2zO5aYl1AgSgk9a6fhd37gOGp6gcCqEccKzEAz+86VHZRwAH/C+3O/LYP8fntViLD+n+CBZ

lXWpQr+7BamMeKUjUeF+m1PkCBQJ9o6OD4+Nn3kU4o
CZf83pfQSLQHSklQPmgHTF1JCL8JiIBsnjlS0wUfOs26GH+/RvpR0+DLYMZQi61bkLRlc4sn9266/z6z

olwbeOr05HGOu4M55S9k3UIX7OsfOZXD1I5NiFPzBvA6AghOe0bmUTjq8d9uqcPVc0G60FPpbLAkKSfU

N2eF6r5WfhI3J3Hzm+ZVLnKk9qCsPrcaeNQHUdWIXm
B3O7+IzoeeAiT1CbeIWHUVc3nMOGB3He5M0bKnZn40B6FuLAyo+ROK19FVwrh1FhVidKk/qqxxeWbIZH

IRIGmBTCtyP0dezAxQPJJFcOAg2jthIz0+iZRrRW97aGp1OTK/FaKKlx13wpGNIGc5bLx7HCciSIu+8B

X28kq/PILbDNfYVrjG/2H0G/Edward+MNXjSco66Jn1cQ4
N8z3vQ3WULRxs0GvBUgQK/dd6ViwEZitQ71svczsq8lGbfqmXOyZu3NAtY49Pi2aGQvwsUcTaT2nAVUz

JzO1DFD8VBc3UzO5q9/GcdqChnd2JAieeI4ZbSDdpRrJ4b5BzgS8RnXzaC0XCwvzm0yZSApI3baf6eo2

NelLGu6yc3XQBPR9Q5/Jo0Xna4dIb7FM0I50XKHhTn
RVMV8MtEtjQowQtL3JRuJQ2cbu7Z11HLruWXwzpdYW8exSwi6flfUtzdqTRVMMsvjpCVNCULvhjyMe++

Z/JnNcwG9rW6eNP7kocMEk5OWjtv5OMGqd8WerBNgwigAs5f5pVKlsd7Q2Ey5aYUR7brtZPk6Fvwh321

e1+eIoBpfPgyoJWBa9Z+QlcHwqiTL0e9nbt6eZXT79
L5ve/1lUVLKv2rTXuZHfTpfXSAKa1yjG7Cp3gdpm07fULBDrx1EJcbRdLJGq1KU3glr924Ml/cACSqf9

u5Gr2Rr7JsmKOTNWtQZH0QvXXQaxwGSyQLKEnXQflHDVFzjuCcRktAjsqDzK7c8CaLr/YZgoRhK4jbg1

Fh9vTTCEo9gWSrU+3ZfgD+9ErEpgr0gzLychQSXo3l
A0O80hhVmZ7f73RiLFmjaSIlmkEj22bb7nAc2SZxdz31dPdDVP1Z8XbVtFQXJQ0ve1qk2KAvkreR6jbE

SckQ7H0RybWXb6NXeyx9GSiwfS+QgbCErFq0nzDuc/H7ooOm59kJLxyz/WqnrdbkGoZbrck5nO7TWTYy

bdGVBDcKsNdZ42e2Iez240KDmvQmcBIWaK/KMAHmXD
vjj18SS+IGyCeiBr5Elml0tCW67wZhBa0R9wcsGWhUc6LCJsT7k2T5bPNO/DqV1wBvLgWbdRLQv8OG2w

/N7WXl4HToclKwAIZaIKmNqNFvg96RO9uZFvZy9nt+gijexab9DEwnI7//dmdZH1R3HRPC6rKsen07SQ

5yBY/oqZ1XQcwTZi/m/HE6o5CSXr+5TyUSB2+cBeL2
Ge/IlSZ4qKeCVfVim+M2DXCTEKrlQ+c4e92viHbiFM5LQTUa3xYbZpYKuZZRc6ocJfoM4UIVokMoPc6n

0Fz+IcBrWwCbsNlYkMBzvyrfLgcS2sOf8ejVUXBfowlS2dIPL3fJjSbF1o9tyI25mjqZL4c5K+inFTNE

COcou8Jy8oywtxO6hcTdUlB034EjdCfzOxCiaLWAtb
EcZb62nFidj6+tV1s/fpYmEF9XGwwNThTUDB2xKTn3wiM8SjAMU2ct7PbJLUro4ipEmD7To7v0XQf4X8

fX+WD3diaiLGSYw7OI3muz9piluyFhM2lWI8YTZ/t7nLnndjVPbmQ9Dn3of9GiBYsJROE6lqTwfIM6oX

sAlMn3tMTL2FrNAaZoXteagmce1o/2MVr6riI7e26u
57Aax5VI1RfLI4TAcZFVm8yzH/oe/bbPbeyy65m2Cm3rjQ4fb01Z5yEE/8ayKnottWwv/crqzfaITmvr

z18KLYFkOizaayTN34cfwq/m0aaRZCcHLpgwc/Ejaqst+dGessWPt7zSMYqeSTCrAerSu6eZJZJ8jS0s

i2PtAov38m3+imXFSBGUQRpzvYQMp48iV2GIYBsE5s
TuXV2j7swBHYrDxjGSIcEO+xPPvRSL0Ev6GhCV7mVQsHGsfeyUQ8YSSdWnAri5s9IDGSG//2thqOm8Xv

/cRa9faPWL43/k001j3D6ew0piY0EzkIXqrOst7l/OE6igho1l+i6HJ/viDF/LzS1AEv5w0hquC/F0B7

80UYTRSQB8vfVSBq87ZiTxrdu7R+wVt8WpbHYpcsW1
aG6nI/K3+1p6wIZPGVNGeWwHtWuaVAo+1+yLGXt8a/BH1cAeImkdcJt/KExZD5WPdJhAtwGf0oBDleq6

lW1GnaD3EVg4lJdPbx66xvMmhhKkjaZrKsCcZ/q9SwE5+iK74npFPlqU5wz/UvdeeYY8tHacbUFmgLMr

kBh1NhNxQQVuAV6zrrdZWaV8YQ18CawDthXItlRHYX
yU3e9YbUz+H2gjRIIGBMSKhsHWWHVbgsx2uzNe+6AN3f3M4X/Wj7ekBTre4ElV+5X7v/DRIODs22PrH+

KtBHjHTBGl2pidf6wrgkeXscesis4TXfv543nrKMfHgGBjbQNWrrdffc4FdbikKsc//fAojjCCDZcrdH

nOLdBizF0N7eTQCMuequtdL5h8GgtyyZFWeVa/YmO9
69hW+FMPFEieOn0lgll8TZbAd8LCaPoeqOiqw1KHz3DpD50W7NNd/MIOwq0mBuYKzx/G0OxF0ZdIbguQ

c2PV0w5B0xT61wHiJIeJCVEMwZfQMAYk9E05Rfspasa2GEyba3iV+rzmlm34wV45iDhNKsi+P3udeJ0G

wti0CtxjkF4vW1Up0y/GY5hkBXBgwfQGzn4ayC0fXb
TQt+Qx3a2KTbMG+cSHKmJhlg467ZnbwucpXmhRSH0N9SQcDzYwUzNHELUMJ29qeypwMK6rdumnsIqHvx

YUQzWCc7wxF3XWgANJBeNSWg5iD2dvvk+T3rHzAn1w1LesDev3d0tFPRWExnOIinebLEp9wntC96gLvH

Qn4FIpjH0tWp5DA8bgjvYb7fo01pnQp6VL3uRgu51Q
Urfn/kFdVhvLZF63I0lFDhDEv00B5Vddg5XztGDN7GN9I9tW6l98P5KPpGDoBpsRhXbnZE8D4kqAnLkD

SB25hXHfAqgCqyADE8NCQXdcQ2z/W4eZGhVObrDxAv/6Ut8GfKUUkFZLDASdW4KsOxAi0wH32+cf40fn

nWj3UIZYAkgvUryww4rRW2DksDfrRYgELxHPMT2Zcc
rz67djbzzBK4DrstH9bwL9WSw8FGeZUXnJ8gROEZuwsqg+6IzSPWyRObVlYNh6m+bfhdAG4yPbZmDBYM

Qfs974XrOWT9quIEuJDd9J5rCL4m1G17291bFx8/SjMdK/YgsgetcTuWKj52DvpE7PrF6tPwsoMSX5wf

+SP0LC7KVQk7k0kkoI4BIDbPlHucQHwQc0KrWYgqsH
HW70kjyqiGikKplRW3QjQBXDWTlZgX+X+HrCwhVj2gRxUmpUzMdVLa3xiuIoj1/QJ274SZLAPQEb5lbZ

N9fb8swomqFxNmqylPv0LTNa36P0fb7A6YtdoBzW2QyoBu+uui8pA2oAVTjOXm3iZ5lcyOznDK1wUBN5

KVo9pQJBHybqx5RfK/gM7a3CGV8Mgg7YBMn9x1Qae5
2pnWTofNrz1sFLkDiUvgmDTrYqtaMd/Doq2iXrGJx/8LCNvrlBYtYsf4+wuvbBx4z6PuulT+dFU5zMsM

P3q1SFZaXzE3F8Gq8QUtn1xfoR0en+p/pHh1j+lECANth3hVcuET1UPsBHmVIG4gla9t7mLPpleVpS0a

jhYbiRniaTrHdrwS7lKB+OOB73/12Z79FVlL0A6r1b
nuhbbe/c7xNR9ATR0ijwSXmuuqFTie0YCJby2eHJvYJBLOLLmLIZ7SnuKQFMhVL9b72zGJ+Wrb0QLBtZ

cjzfUt5GVF+xoFfVyOYgY332tZeeAjCIctZddK70k9NEeO+GzjBq+elT/MwGsrMagQ39eGSu9+St6qC5

T4KMPIaMwQz2Nlhn9Sf1cbWC8AobmHebR2Sfnvdw7x
FbA9fi2fooXWMYquip0XRgV0NhrZG+6hb6FfuJksSVFJW9Zm6lG/00XuY3Ki3HZiCYRzxfaxSvH/4nd9

8YlkeCnnHGf915CtuCFzrPLHvm50Ac7HqB5kGhvNmaJXnvJ08NuWrHvUtIEKKbWEAg57hCP3taSX/ldj

3QaS8vcIhY1Hc4GK2006AjyTLHed5lKtb1aVExBKD7
xBRRwd3y1BHY1ULUq+eCE4DtT8OjhcRIwqgyVydrINYykbEUvwtTKEKdgOjx9h/6fWmKpDB07A8KtvGG

B+doSFnbAZzUyMZJ2wvtSh3XsDX4422YmZLgCTewVQHANAnTfC0k4I6+NRz6yGNQN2bwIcyG/D1bvfxg

fA9WAlcwSAQvBZl607ubIzNLPJc9S06tz7EVhzyN8k
J5JEcopd3X4U1E539JC/L6kb0BAtjyxw/s+LKrsV2yr0neii6uEqvttibsuaKKSgJxX/nvFsUcHqTQFk

VfPu+gB7MlZlLChvK7qXWivVqVnCC00topspSKlLGIVm1X/xkFH6jNu8gfr9UUoWUIfaVvBfbnWOfs7P

/YBFsevqB9/19/b39UDXTNx0eN1GTFQDMgF2eCI6wP
JqfGkxuU+w6INRnkKVhBFpTvd4DpNZmr+7PkX+zt424GM8SwC05ATOVOVHc15nJIIsUdNUm3XpZOTvxF

60AlHobhslg5swRu1BGekgfBSBHJiJXkkuVsFjyioNuz3aBCRqSjERUSQpKFlUIGqFy+HvBEtiasmIhm

wO5yYLqJSqr3WjLPBiB7b5YvlJoXMQfU771NqMhilb
VUGSYZ/DVn9uo4tURcHiPB+VqeLcw0jgDkm4gSlvWOn/VmO+H5lB0mFQ1Ujz3JQABx2fE9NjyGcN6BKW

3PRZLjayfwrQ+LjfWsvlx+Cxt8YqciIa4vGUyY9BQVggr+stsHgQMdxcorMsXbhyBsF3pnrIL3uYS8ql

2UTcQ9s0vhAPdljZkfYxHfGrfMjJftZ6uPvidtIgQi
tFe/ConeZvGtBX5vZ0Njt4uudDShmQy3RUCD8xPD7XFg2oip3ZfLR/MfN1gvPvrnnHuNVB6RyM6J0RWu

xw8VIOF+CH/kqKaujQ9iQnXiM+PXZFqU/g+xpygn8TAuOq7QRg02QQVnZS5ppYNT1/bGO/ZJiqXzRlLj

ZZ0yMmIjuFgUU33SLTOqjZBdIqRPOvGlm8IS2mjq5+
Tb5huzPA9N2QsVAxJ6Q3iLxKX0J/ZmpQU0XJIK919rnGEwkcX5kaGzMvSyQ27QxKcxfe/XfyP4/IoR1H

q2ExEVSbqmqHQFedPGIDW0tgTuffMx/njoQxAm4i/53YNCKIyaxLQjZcee6NDt2h4Zxirj3ikiAY49uC

sf9xcGoql+UnG3v0ga8qof11Oj6xVjwm1ggECxaBx0
XolXqr2BWaxLe6ONYEPxA5FHSWS92sKh0eNsRiyf8j1TEoYyHd/ycYklUEGYYtGNm2HcTZ1mrPZKxZ6T

NPmIoLw3RFbWPoHHvwNMKvCTnyGWw4JnpHq1jFOL7v2ACACECAp1LMD9iWioA8WlTnRHNQdW/1f7GGzm

eSBvmaQjiUa13VOrhTuzXjPpNCKZMf0iNm1ahZa770
ZUbWknFGmH00bR+ALqOvVImE4d1XOLq5ohenilDde4brJ+Uah9xjqEYyctX9vm6omeKcIrIl8oWdyGhk

TMTc7fn42TLeormUcnZKdtPOfQBwLh49ujkl7tOll5xxuGeqgEbHRac2SHPs1ph19HImaxvaft3l/J55

E64nPMXEsCOHkChdHl4bTrIUcog+ijSa0j0yVqKucD
BJaA2QDbbC4ynDVowHLnK/Qe1nP0qMLrwoEX120kSNjBJBE6aJcB3VmE9rb2WHsZNP1rB7ZWpkpWF4Uh

nTDWVeL0YBWEX+vna/aMlw924290B7DRrD5m/Q8qCYdjeZej7h94o0b3WUyk155vQvehtiE1F8+Hc0n6

4h5fNdLrO21VB5Ny7nlzov1Af0K3edJQCWq89pPuF3
g2IOo9ta32sPc+ZC0Etfa6rReH4foduO40bmRnw+ZH9MSId9CmcVwwcijjQDjECyJPV+CHhSID9RYzls

gltZVTYCddvb8rRJniaVH6C3IMmYh2MdOe5/xFNXBMb7oD/7uTbsMqLDEhaqyrs4SjuujO877j57xQ/m

IPDoHUMoLbrnuqioiktbSUAlX/Oj/sD9o8a3aEVIme
jTH1ib+jAK1StwE+8YL44vn3jsfxscyHW2VWif5ocVSnPMJDOR6bHs3T9CozTqA8Fpj6IKrR6aqKsi0g

PsmVaaIDLgytL5VTks4y8eLCA/Ch4/J39DpGY5Wx1/yl7Z1axvqLxnK72FH+it6sfhk2utZFyTP/tdmx

XPqI65t6orCGLlIRQtsbfF12dIjq+p17vFEuQ7P4iv
BnDKFsbnU9y2yY6nCKEnWrUdPye22m4c0ieehhGgGb4DLyA7Q1V+SKN4qa/Y/B6+WZCzySd2asuqTKTD

9Kdq45+vQq3oYElWTQYvGcy7tqC1Blg73/X9XDUIMqcxvaaAlmrY8mIbOW8WKlvcZa7+fxe2lftiAJo8

pbUvqkxprSq/qdXmVv5mGUrRU3UZ/jHTw06lJ4/tlV
zp36L9wDUbfQ6l3bVoEbCsyZrBq9PVoFQ/2jtj3JRptA6WLaw8B5NeyiU/gyOrBOCc6i3VBY+2RffQNf

VMvjsKIvY2+1Gz4A2mtLMY3b72hD+VHTKaul3w7oq3FhScXP18duxrbC/6rbBAZj25jnsMp7pze5j1FK

jV1YrErprukAxFwr6rVVHkWJh/2tUQIVzgF53d/8f/
u/KjyERzripS43x1oRY9VwUNQz2b6iXSK0cZ+LHY3OrnnPEg/V6rSSefDdomBPr6yOLaqvsYcinAiQzw

WAAQXE0mLn1wb9qy07GIdDmC7Sp8ky9/4+EKHwitl+uTc9GrtfbwGFNU3/UDMu8gDPog8B+nljZcsL6b

ERqo9VHGggyqN4Vr+ZADVnwRUUaGHr1q7IWz/fX9dT
SE0cbaR1GvwDczBJO/COoInreU/mm/1EavbbjZkTk3rsB+7fXs0JGhnc4I572ukAkssB14yuK990f+qv

v+p5A7khVp7OCylCtKVWR207770v+682pXesioBLvRydhvDdC96pVjjWs1yZ7XZwIjXZURpJ0kkacYoj

larDCVi1XPchZO4/8jaXSSKDqgcrUD0xPaybRavf6M
qyQZBCZfmUSETXkyJWM4fvesbxl7dFUrU/14SsSlBZujeksqcanvtB5nh9qhyudlI+l7lb/5JZ9drXIM

Cxxc5BvBt2Uf+OcCTI+XK8Q6Apef4mMw6FaQO6hMMOsu8KXtIsodSi9cr/fe9I8abuIGRAhBUr7QnlHV

Ax+0NHYcjR0Uil+JiSb8JmaWStbX6b4i5BLrfRTP60
m/IheLZw4EwZYYhXHZhjUrNnfk/1jsZRRjpGjqPJOUe4yT2yHzjt4LNgvbiQN50XEW3dbHW1idDXSRDr

Xq05xxCmzGazRpXoz5FLxAAx4pVPlaXy6Qj/995Us1jC1GlhELrI6CnCrP9UdCB/SXDgnxUisxvkWsIY

lWqilAYZWKrJ4HToTE8Ll/yr7L2N/7fhYiiR1ZGRUr
YEy59wo9id99xhInn37RLdx6QEkajfpoY0G7kLifeEcZt96UPJE5HVzKaHp3klJJsek77mP57clz/9Pl

N1mivf7FeQeWvpwd80l1+G7j1iiOa8U/67WJHMKe2OmB8u+2uklPfmzbxVu05yudShrg8m++5PStgXNW

MdlJ0TvfsNbPwuglnUwfoJLopT0ay0+0elB+W07Ji9
9gChCwPu8vCbFrbnOgKZ6gLq7plNQUhRv6hp9l97GvPmyoO9FUyjORK2cKjkcYFVZpAH4gA3CM0FGnFo

QOrC3Mn4hzn+VP8Einy+qZrYzJ393T5/m0ecauFqOzCWhVjaNWwyK19VNSWPhfpF85U+qWScmylQhTHe

RHVjgmKSz7PMCwzqkbD1ZhxX64y2qU2ByhrmN3Gcbw
gQvrfky6fSwA3N7o0ukQCCyrCZmdg6hV4qwVh27Psow/WE7d0LIh4LPK/dWLqHKk+LQgC/HNJ9VpJOWj

z6YClOvdXYKTBIX2vHG2UmzmrGsmCP9PFv1MBSjcLjroBnDouBO+mjGYJu46SOR20rSd0R9JEPeT6PJm

V144eQ2TgdwU9uYqAyTazJdL2jRIrWptvD5rLWEQZ0
fqguZs/jaYnH5Ozdwil3FRyHSOLNh7xtdiZcWxn7iqCqaSiOKU8oLDaufUwAD3bRZc+fQ2UeG7/5hWci

egMkSvoRroEOAs+wx0ro/vWFd2cnqeSlAi0nB0XP5w4uHkZWetEsP8DjeUADOMprHRWRVqG1HMOxb9Fh

84sPVBq3TNocC6PswZYS4gYmod+21KiuM7aVnfhAwG
DcZw0TCoXZ75t/c9C4X0cwVDqh9rKS31dKwScob6niiqntJXEbL4ahyzqOQ2a2u2gUqqWmut7Wr6Ovd8

mo1/LbdY6zkhbAgFFSo7Yy7P6ew00W/zI6vWopWAp3QeUpO+P2OFVAFC0BcRXI0IL9+I/Go+E4Wpruag

p6n0J7wrI6QN7+Tej2EbN+ho+HS/wTGUBPP2AtVduF
ACntY95dbH9WrdPxmzY3JeYdsONV3GuyyZhFIRLNOoYvcNSMGEPQWg+WXNUSfElrFadx1pQOytoQaqxb

qJBNngEV4wIQf6sPBOb/OJwyyD72ynk/C8GVTsXIh7I5GBhpPGYphaA8AlyH22kt6grB44tftqJcNaOz

BeJSYRMpmQMakzp5Qcfi4jITiyOu049Un6hSZZjySX
jgXg1CRNtVagt0grYkqgeMwjOyRfvPfvM8r9IvaiL6oKflQw9h5hYuw8kI/G1B9mY3HYWWuaOsRnqIxy

Ok/HiSp+1MkI3acmkS+T5fooz3EiwJZbO7wkvGJ11r4xY7rHeW6MMGCcauCtMzhUTTVfcAyQ6NhMiZBi

zis56OTwwRnj+OQN4ZqZwamldXPcgQdfkRrX2VMUhR
B3Bw87hvUoSqQDAPKe6NrSLjjAa6fKCdP4bAWpMqcqPhsHPmG0L1raMEq0m4w/dREMoL+w7PJLstqCRz

hIyNoWdC/jN4Zj5CSvM+rEIWAFThPsOhjLEbz4VOt+A2SNrjRcDWNsh1nI5YNEopRHho4f3Eby+35D29

sryeyp+3fQGU7ERK5nOrm414v89y/Om6cabjdxoDSm
mzFkrk3j/c3gx1IeUodzvSjP+fK/lmEw0Ze6LznQCMhSEtS6uNkub2eiO4Qj/+IZSkwJmdMmJixyTEIl

WUBEf3gTnlzbVpMjJEYxNK4s6OcKWRJdgjJweJd5B0qAdBnT6Lh4mXU8xu4h8vOAlAMepSgDtaA2RVLZ

OTHZxelHxtj4SmQK5Qj69eXQ2saaVflTEkbI8rKoiF
+nYpN24og+Nvu495jtCyakf5Yqr8d5pt66V2/VL6v8vKEaekeCbuBjW34LJzSMu6BhlwCwNO9CAEGiOo

anP9MeE+COR8yB+eHU0smiUOYUaRAQyXOWuSmkwch3y9BdiEw1xXw9rOFG6PWG4x0m+S76YoY7vw0f4L

+oo3YletXJxYwyDg4miBvaSpdN+zxTNTfDLVpppKIy
fYqxDocwmKfPzyqmwa5D0UCTS49fjSVsHzJT/quVpLqhWOH/m5EG9fzdtEmMCxQucBRfav0CNGeDhSTy

8vxt/sPHrzc7+piODx78fSL2NfO6pL1u3dcoIeR7Cpew0QiTaZ8D/R3em7uYeOb01icroA3ir5WzOwSS

1mvhTrF3UMoolnH3APP+T71/PIKYdNvlOSOGlZ03pn
9gSD10RE9JcLhhDm5jD1KeTzPKAPjQRgwrIZuj5nmA/4mRKKtI+WVPEZ2+jPQgQv2NDnE36jOxL4d3cX

pF0iDpE4pUQumbJu3HLDsjeujWoE8WdoSIu3dmpSOMU3ot5j4cMI77jaIsySuke7TB7BoeA5/ljgf6oY

8t2We7D1ZAnuJdcyX5yHu3vgr5IsAF2nKRC0mmJxg4
QN+M+MdzoRd7VbmZTUrorNvfIfMNMveFEnzIVfAItKPr559NEemft9mBscRSapjKvJlU1OJtgO7eYFh/

sfAFMxYnLGlINTKPe99lw6aoqh90pybFXTcmv+wFGpBsSLHg2b26W6l3I+h7X8c3xjN5v99t6TQObvd1

sozXhe6uJwvU9VfhIiffS+UpCghYdlezJ9/3W+zyOs
mOL7GT2n9k/LRwmIrjS6tcblwcA13DdW4PfL240SicqtE1IurgJ/obxRQ9D9qeXPz/U5Q5V89ovn+RiI

ZTDwfz+ETdOynngL0o7olThjUxwZUKgzJSix8V1IGrNyQgCvCbO/Acmc8w4Kz+7sT9qRwyjq2USk2uW0

ppQ7vRtu+4+3xWolaCepJXJmLsv4UT37hdIDZT4uiA
UqA4vb3xFss69+ZZKKC57fDc4nnNSYOmrzN/RAJujcvwCEtxHb/zrNxySNg0GFLZ2CylutDR2mre2q0R

RVc3i0uT2FXxC8rXahOioitWIyjFs88Elmnk5bMt7QGDU+WeLDx/ZnEhLfbD+g2WIx324kAqPY/IO99t

6CyPy5iEw8ncBTwRK4c0vkApP0H4rZIm+uhEdF4ud1
7oolyNsHV8fXnhggpj79gMn9MBJTGnTjiyVoWrpr11E4B8du9nbn0BHGpYjthzW8F8Cekd/qzwE0XO4z

eYBqL9aQooCLuOqCLoU6pENUZ5aL/5I4TpRUc7B3oDxR9BqgV35KAV4NHZJlFoHpmkxPeaJ6f1xRQpzv

MxMEDg4S6zyIWUvKiVZcqRcnJess9Qx7IHMTA4zGTp
xj559QBurwwFILHJu8RARhPs9e9ML2DSHo1JtRJQUZS5gh9d/FQkMdolPUy9P9cDr2L5kIG+4LEOn1tT

zyvyEpLzNV8bQOSMBJZGtUXehSdj6Anzp+PXE+wfNSUPzpu8j4OL41hATsd6pjjJ6ErgsVZ6bmg8XiLm

08ndM1vd+IYr6CxI6p+Ezt3gePhAKtDhIDpZPbxYid
nB6CJmr28VlItD0VNvQRou12Q4MSCxttDUKNIrkDzg5ml57LBxMbSEH8KgB8dEDO6ZpUbQJjcaQV+4UF

lQyfj6+c60w75l0WO1U+M5a8TZN+uI3rBR6pzY1dQe5nCo00kXR/x+jOU3WaSpViWgGsctgatJRq2upm

GleuWkigmak6zPDZHunX0z610rUGd17jO9qBjDIYOi
1IrwT0QFqgqhc2/FGCigfQk2oVuE+Y+CKi4GyJipKn/qakIOZkCSGMs/LYIidH2PeZgjTF5KRAMlMpBC

bxLVk+VISJ2WXqyWrEFRdEaFLwNU9Hjfgqj9o1ob2luJMOXzsVL3jB2J3OS9s09XpIhM4g4l7/mLHDmA

f/aB1fqWApbNs8xrlc0txK1ZX2bFJFY1wALFhioHBC
EA7B0+ZFS61lU3lgkKxfPUwOZ2fABhjwuHNbp+k5Yj/EaJoqbH17WsEhkTVCz0y47C4tZCo3K97Z52it

qvFPtwK+zXqS7fbaxYQQLqCbiwRXVkoNzU5ghPs7Pp/X2tMIz3IGRg6B0bChK3Z5nvLsHNB8AKCRkleN

y+toClT0sJLwAyv/c8hmXJ1JhUJIBjBDPYp0RPKRrH
cHXyYwix823lbyoWZ2mBXvfEeqRlaILp3kZn6SzOEhHyUqlBpE1yin+HZZWhRVdF0lFgyC80ncr4QCz5

1Tp1mLynTON5p5KAdLEqYCuMPpp3n3k422s4U+crS8mwssdPS+f490FkBXTK/2Py36dzexR+jq6XHqf1

nO1i35iz4f2FlA02PbWXW2NjatOPhSvZDzOVs+DM6d
6W19u7IwEtw8PyqRhwP5ISsbm7mc1LkvGmlf7WFwUTr+C7Zl6/Z4VdVZfpPE+LadNhUMEHNG9P/8LPN/

TzBua1hoHWNiYevDszLB7QUive8oBqobcUhlxTSTXwMhzadCXMoQ9x4SDusC3KHNGHzgoVOL4wkoboeG

vHam3jPfi6y2rOf+9Os71IUFGyOUaQfJI/isVrgTvX
p7V+UHLnJqNVTFvO6nDZIkycMdXnnZNIBLwosoQijKsUA6iUoWYFmzQ4MPCXCnlgN6kQaXZBFyhalAPL

UmbCK2/JfAKb5z818gM/0DWAt/Utegrh+iqtbIPMJ/bAqY2hNwQQobLuvYcaaO+2lw2Ib+qPrlTdefrw

oYLXgJVzJH+LBoWSHAig0U0xewkecm+DNKbuDJL05X
U17q5+/Tk2srqnh2vO5hZGbPV9ljJxF6JFWqB5oTrJ9JByn7/LCh/7OTMa5+IEagie1WCX0449mqAIpA

ghI8EPJE5IcVkysbNQJfXiDk00Qei+7S9D2UMkxaAryPVfuGKI2MVyMvo5S2UpjGnJWArjBBdJht0saq

jW40pw9Y6Tx1gIcg1plgFaNnzXW5KdUfycFt072vwe
S9EzAYMco+2ns4Fu6fw/tdKJPUzQq80aq3e+m1wQMDeejgioY1Xy0kqXhxDBhc1a4fq75uVaGW5kdXXc

9nVq/z17LjC3HczztWf3LIt4JH9QONBKNX+lULzZlg5lniqhL3GHbnWj0xDL0HZVviCe40bPUZ32+IQ7

kpcyre7TX3SK12t7rEQEbGwoh/AdWujTZu/UJ7kJcp
qGKurH7su6d9mfRhF9SDeJvX2n0p40hWvhpesCJ+x4Wa5qEA0TYPPzapE+KV7XEaTnWGpT0bErrxe8kY

R/onpkCdkvFSzx1jshd5k2tCNmu6BDr5xiN/pl3CYNYUzMRi5nMF0jHhFZA1GCH8yGReWJOyWRXWS3iG

FSCAnm+iXBOl9sUyT72bunQoy2EHNeSGUqgINch+fF
dvIwmoG5WgRkEoJxr9omq5IVSWgUi+CbuVbH3L8av6inN8WA5HwI97LXXdR7Wlq9+APza6n1JGBrozRV

OqhrdY5PhY6KeSO13hVZzLZ7p8kwVGQuGhH9sTogPlglolGwoWpHj1+9LpmdCcByH8skMIakyFPUwcKM

RytFG7JErGbuCycReiEyivVYFJpzly45WvkrHzMZsS
X33tOaP2wa4xYkMKc7gsnTlhOO9YNYGinB16npZY5qkR7YPe4B3VabjEnNwB1iK/pUi+o7fIEwIM9/d/

1jxe9Rh/takbQpb5hoELVb1QBOGmWQ2G18au7Kr7crF4qln4c+GmsmrXrJtL8do4GnLlinDkwT1x5pO+

b+8UV/i2zwcr5j3QvBhHSsCx+HWGC9jINdyP6jlh3f
k9fskV7UBNPpbR7+4hBWbM9gc+0vZAv3hfNR7Ni3VVpiMnbQj72gp8VZ7Wei9UVb0faJ4nZI3JBGb+jv

FmUxPjElSGOivZ6q07/VcV/hoyfGTwAMUpZdv74XCIQMLr+/E8tF08S97FMp48YdklQv4XOFwtMBmuKv

DLhkbkKgOW/5KeEmXm/bimYrO/AoQ1ertexS+ImIaL
XbdWlU3gFubzACVM1uviAxIP+6ivtnSlHah/Y4zTc2s96gGEGCj0EPb0dRLdQp8zmexdUrR0Hn31bJsB

MMGQ+2Bxwi5XoxK2MUq+uOic6K2//XhR5nkqzGno2j0izNcNRqFwhlUz/TBMLFWV3VQY1z2u+xmT1gfg

YlkahtbmKo57f7cMUxtVjpkdcyR6DwIHDuV3uqNeOG
WNhNBGnc1Gw3O4p71hwh31RwEPu8wLAMLj4+0fI8/ZcKmFHnBVfkOXc6Y9vWBkdn69h4kfDz8AYaJljG

7i3Gc7XwwSP8Gc+CSc2lJXRNrZ6EjIZPkPck/S+uoS9umzoJbxzWCnNWZM2JRUMiJsGQ+h/u51jFiqe4

7w3WmDFI77NK0RmLK3RAW98xaVTxtX5HR4YLT2pox0
fyFw25NcCy9XaVEH9YCWfik9MwaKq29VdBS8DIrFP0BgFSwWM1HsF0Y2Us1uHy7vtARt327Ty0dURlcW

b+Gb7PIsUq+LzWuLZwfwtITDtGTj7/IIm8AVEh3QkSeuqdNF44YPpsG+jWA7mmsPW2d824iZvqO4WnxO

LhNgOkC+LtsXi02urVjgpOYomuqDfZDnS+RR3CooRB
OF86klv0G/+tJ1rB72Nd8l8dX3a9zPcPH6TUlzbRsVdCUC35JGAkVbUB+pqojbStpUuaPUNKr5j6tHSv

d4upGRuUFXoe+6iGU4+GMhjvAG+6pC+OqPTw0/YeuB5WXV+kn2bF97RsLrPWB5DhOghe2DFym08Wr+oQ

b3WlhrwrygAIhlhZ1wKAbjmsc0AbWJlzJvg/TqbkEm
LG+YwcgX0uzGyShQ917Lmw86FPqhR3V4GUhQc95w+uMsoGvfoo+/Wcb53NqwEg4nYej5xcEpk7cWK0Nx

Tg794DY/TC8lz1S7os//EXWpPksUxRzoAyPBRqQoTsXqGRqEXnIwNHMDrUbgS8HvoJ5qAfevBIil0yLx

67fW90/5Ohf38U/OOevsva/rvd+fbq0bS8bDDIrTJK
8l1qPRpc+O0jhtXpQzX3ol3gOrFcsuO4aAV35zlN5tE/fJCL2RbeIJLfbBB/RUHU55duLojDi7V+Rndy

+81nsOdwOL/hfkXSvbQQ8xrDyEp43eXLztcuoXtpnLysmFN8W+6vKNmSQrZXo/v7CBgnP8vwM2xfs2f4

NAwmb8wQsPS6pGwnedaG8clagqbq25RpZebFu74j4H
rmlEX+A/2eC8fI9b2XO8u5Lpdj5F201Lk+Mr5gz4Dned+76CxkvqW3m+uDmvqD9wwKo4/KaZ2BtrICY/

vviMS+CRVVwPeSueVPQw1JRfprsO+dxN7QUnPc0q+oEoMTZo+kaaIwxKVx/6JZIG+1esrtLc6IeXd8e/

DoyM/iRTuRVzS2TqUGRLSHUtsZVuxzh97jwX7viYon
uKUTTLN9Zjov69EB+L2r4w719p2rsn50xch+1ORm7YLj7YFYTHydY4rsMtygNBzFtYBewYqSTT594Xl8

x8v0ii0O9D7wTUlKk4meqOCog3OEKpY31/gC46dCXaDJ0Smz79v7nAjemTraPmu/cRU5VVsLxZ+IS9kS

r6mN5cnu/wrYkm/UqiPUR+IOEZSBSuq/kk2/Gjl84b
63jPrOlDi+PLJDCqm9GBLKs4Pql523c/ks3mTb4tQ/PMtfmwH2WnbsyUGLS3TNjT18nA1/ex8MklVrwR

wjOBJkAg1b+a8KTxLp2IeQopGXrNt8CS6J7P9aOhU1N2GmvCod9RQnsXgX3Y+kf3rsO66L7kO8/+Jg1z

Dzgwk7+Xd9ECJkqL48hpgQuW2gtYOWDnadUm5weAL/
DdRpWUYXpoZO7ba4nK1r221vLVsrkU9LCiranUQE/4OVb2b6dhI02uJqbUXHVXy75tg0osdVTUpAaHOx

3+fojN7i+7uspJct6xY1mnwMj+1TJy2/rHxOogi8GR4AqlJnd+mvK6f6gR/KWwy21bpnOa1Y3Y+3SqWK

PSFsHHOPxmRmSn/O+DE2Cs6WJZmEjlLbDFRWuwv7yt
fSvKqBlcLRjna4kiu1xeoqstHyCUJ2fiDvgNod71L9yXsG9tnOrEH8Ml1yYtMsV16vtEclXQPZ3vqhfE

ZcQ5N/5ybiom6ZlbbXSEOW2LIqC6QH7pL+FHqJdFd37IF7zA9PXjAQfEP/IXkEdM7ncZaxOWuViYjisc

KvdvIAgd2dZEMKN1P5+5P2uI9yZtuOYEX4zDzVBl9T
V25oNQ6hdrXUDG+rkC7+pvFVkTMB7AVjdHs5AL4pOH0ZuSoOl4LvS8rGXdG/xP4WKQutq22pj49T/Rnr

fi3XpcJ+Pvw5y1ZWzcwiH9ThwYV87YcWuHmeJH0MLWZutd3aQiTI4O3BgIvzNxkJpGIpxaueXJOMHg4F

mg62MsLESSZW3WvcPrcvA633s+Vyih9dvYD+DuVXx9
opGfJGs8npgs/DdJLK+K2cEd7/ZeXhCpzh3UEUlb0WNgwLqe6vMc839xcs0yl1Wz+LRzU0LOTDyWb7Lm

3HCdV+yOcy6I4mRluT3gzL6Ciji75LIhdRh2IkQ5O9JTrOu3UHpNCoWG0YT/SziqkXTaaUfc6l4AVY/j

S27t2nILIlfgx5v9Fv/e4+bA9QXkgySz9+ITlWcmfr
naIkQTOVgIetKsLpFa6spvqkY28+PHAHw4cjlk/pIxv+I9RU/YtQn3unE0iI8VSBH9aJuALDDnsHe7N8

78ISlIyWV9HFwNgPZDBIbLwi7JAuc18MuVp/cwBFODMunlzzB/WPX+clb2hgf7/0KRzXUvCoIyAEMd0p

SRY37+ioZ/lwZpxCJJkFEhUBFIcaVip/2P5LVLueu9
leB1e05gidFJccQxCRQqM0wj8yojNht9qp0Y14e3KCaYOy8zq6aZ8TBZpEVqH248YCf6qT9AOGVeLTaK

d5BOmAvnwOylft5ms9Yqg/IwLM/dFG3q5ZXefvZt6Fzp7+AgoYx5mnP94FAJLcwPnWDmHQNumF9SW+iz

6UR/ohKGZ1pmu/FQ4lwA1hY8B0OTZmy2nQZjJ3BThK
IdVsS/Hb7FTTTbuB0FTYP3ghpEnt13n2RqDGtxZw+E/fY5pqFR+DcpQTN4qcqWYir8s6BLH15X9pOwVp

wvpargpdLdlqsxpM99Bg8FyO2539KgCEkKM2F2VYQK6EyYWUC9bX+cTfwQvBjJQlPhs3krWMa29+qhDj

viATaBAYasN23B2A7hQ/KQpH6mlbXbSeOx6YOmIiEC
N9gG2CPBf9XEdDkWOZDTQZBdx0qpEnSnS+rZ1ehxUi8px9JD3WJItxppz0VQzUFeQK5vyRm/CkOLZWMQ

T/56fyO99AfW97+x8PGe9L2iY1NEK0A680WZjpn+m+RLZWkyBDQFE3NmuHrpJmxKVuveUDUENofA3DZ/

uXljoAdX6seWnRMM6F8+dctfVfwwnWZO+1GR50aZ2M
skYzlqjJKGXFimh0Xg0a/Ll75py802wGWAVXRCjSMZNRCxO3niTXuQk4Hxi7gVv3jG7GqIT3tPgddoef

CRHUQgdXIaHdWNV9S2G2bERkjaOSDaX2ddCfrGvFhz+2oQp6iN6JyqTOL0KsGUKKXeSa2q8EIG9RXWVS

M+qJ1NApEeGi4PO1x//fv5JYjA2ClvPsUB3i6RRx/8
ZLKgxENqNwoPrymOLCnboPo7VqjvNadJ7Z6mysVkQTq2Y7OhTkVNObC1IZHB9J7riiqsSQV7JyzGjyWz

7kSiLp2d2A5y0LBlPhz8puT58osfZQ+rD88xA59+mScpPU2zsqK7ve5UHNTX3jv81IrN8ToRC6Nh4/J9

Xncd8EKPpqO5jWIuw020hs5QtUrc5SKXSPYVlB/m58
NEDtkap0ZeHm1iMZ+rz27KDyE5tEQh2GaWWofWmOemAeEFkn5uzYNHeXW8BCwgKNLcCDusYmifYJPlx6

9Shd7vYC5WjMsBZ1B9dIdH5dZpPUuG3sef7z0djvS2upiaSScGKnU8V/ZovYd8+IN8rJpL+5TTuSEiLn

E0IzhCviO3U6T0hh8BoORMx25y5fBuJRdCGoMlW/8k
wNQl5lwQqtTHasTtQmNlPjAYrXaXPqLp/rns1iuZLlL+3DLF9Knb5gb6sWPlUJIInsRo3uuA9zxZ7R3T

7Fbi1Bdv+hAm+RJG0bGKmY8A3dv/TtdR2RO8KPwfAC3DuIgM3m9FFPh90leWuXv4/4zZhg0PBNU/TBa1

uR0GE/ZeDYnHL6LnZzHLW+EFxR/wDM5eUh+/uxOon4
LetSvUUM7aVgyzlkiU7nNWAuftrhRWlVWQzM28blSTbxQQdlRYDlxogN5DmH5zFrYvNzCUVlHNyopTrV

UIthugzDwVpkTo9XV6EL6Y0uBp/4vXlunxU4qYlcJK44IE4IlRQ9uFrO7KdWd8Q2MSFbHqxfHklHZxJ6

oq1CL5cByXdrQcD+/2+SApaXGKRPFbMEx5lnL/Z2Hl
TwfOtE/90LSxQyxPGizL2V6iEQ8pdlEut340Xrdihn6VJcI1o5vp+LT+30ZmMT1kGYlugJh0TjllwRQ/

t0qp7+mn4L+SR09EIvbiv1ES+BJ2bPiKI+4hz7k2Eyz9rrchjDSNsMP+7zuqOc2xdqzO1PeduXCKlDn/

mbdNxah1tmcgC+MeasTOFHndXiPYsjI95g++WVYWLa
FjfBeFaXo4spG/2B/JRrTNgs14E27vPw1tsMhjtiadQ6nX4ek3ALOiyxvnphJ/A/tj7kkFMeSYIwMBi2

JJA29LtV5rNYY17CNl+CCIf0mwxACvpTiunpniRbGUlNtdAatIr/nm4c1Exp6VRRnM2DO+ttsHrwTJhV

8bCXDvOruqoK8dT1wb1JBThrnxmli8V9MStcryE1xx
C4c9VfAlKlUkjE61uFSmUOlIJ6wT4z+f74ocKRzfh/IscigPsa2DIFQPa/1pVyIv2Of2/iKVXl++4+sV

Cxx2ouM/gwnAz8xyKRT1dBabSRcT927u1mOQ6JFbd6TvLr54lePGY5l5tApR0+HC46AAbMqU0y/9uNzm

izE8sOVZFnBnhJEv7X7cIqOIYxr2m1Wd4ZQtN0r8qs
Z3PGGSpubVzHYy4VBDAx5/furtroFW+qIfpw1bpomwgfP+SBFHnb4mnZsUbUiLmF3ccE/Tt14qnfUMg0

e2TmbxZ1IWwGaLI8mIe7OKyFdTKESIePwOfNXJc88QCstgkEp1OTVjh0kSxe/yxxwR16dMadMrdLQznY

D3rD6osHDMnBn2mVZa8GKoojpq6EGxZBzQosRon4mw
Sv03Ln8gXyJk7xEBGFZC0UG56cG7uPPyr3ua0RkJutu/AJoa1sbW2buCIdtwr6ijtNthPvbhk55fAz0Q

QrL92GtWsrvhZzKUsUGQAksB5sXUkUv52xVj4msWHqeBc3z4vmv3jNrtpbMAmWMO/FVugUgN+7mXRhvr

zugnCTb2XDn5xZpTvFbkFjrvO3VJsGg1xG/wndg8fx
ze9mzsZDBKJADz0QMOwF/UiAwKziRX17N1wMQMXHwrl1GBxXbCHCU4hLhiRbkThHHs4Hr+rt4X1fZ73A

eS/HEVuWQtwLdsXkMn1r4A/1Jnv0Qkm2DZ20v9vg0XRFXnEe86Wgu2t3KOIYwwkD+HSRmUWXGQ/ziwfN

QLatoXKC4sWgi6RcH1PiPya+kd58ls7Rmff7Toj7X/
lXN2G+6YpCjafvYU+yG7omRfildtfep0eLvhwole8GDmB8qX9Q91XW+BVoEM7pXgNrPW8UGwTQp1o58Q

yY27gIkStyWKI8XhgSDVP2Ex0jsSa5B4H6ZMqSpx7r2MytSD5duYMttJ4GG6bIEz4C/2pbc99dvV/uFf

e2pu1cUnZuF9wqcIjO+T+ttMs3zFANFE4tFpq+BMed
hdQtgGtetxQtoH2YMaAeV+E0lyEDELCBxsHFmxMeLPlw1G68pYEinhHi9YiWarPJCghCxHivuXgS91qz

JgxR9slhsIPJvyVZcqnQWZktf3X4DFGRgi9xJ0ffGi4pQgMuMGLZtCmOerhVPhzDSrzH3nGSiQQBcSYs

rAgU4gS30czLUJIPepkVe/w+rny3gQW3ONGgMOdQZq
ZxsZ4Yp2l3t90E1h/ia4GzFf1dl8Hn9vS+tB+Fa9c8vXT0KencSSugpIjpGcWkJkRxlPfIshw/yNgVel

g55cFwx1spnSuOWeqdWKAtvGh8u7x0pFxkJu0xFJ9C7GnD4I827WlftIPImtMpMLvGE8tzM/MfhzGRPH

mSUjfMUAF2vaVlV0Xwq1kSLN3Z2hLKabMYJqtzgxuZ
7W0DdK1xq/tUtc313SC1foVjfrWcn0WK6wuv3+fESzo+UVwIFSSUZ+J4KzlRgbrPp7FdnR78y5zWLVk4

aIgVDPYDJ2jjlig84FaPCupigyE2GIaXnY6uSB3QOa3LuzRGU4JoDcMrJGHCheCg1gFulrtEijLQNKDN

tytT93NvA4GGd2hPviOag3HkTz9+omNWmkDF9lTDo7
sfnz+k3vjw9PBerh7sFHkCCyJbsSfEHei3y+D+cBhQWbWGJ8qEZoKH47rt7a7cF5RjMDlpX2LMuyu3IS

DPsvqJHxT33aUhVVMfcCd/ohR+Uu38ttqgV87PxPmlynE59lRNqJX8pYDseV8ooNaQ7dO5Mue/QDFXGT

WRd1EtrCJXVHU7FwYWY62m/RDkp6AaN7MC/eSHLuZw
EKZxtgULTw6HcqBE8pbnX16HbP4/HxuKf9RnQSIEbWru9O8nvjKcgsJ5H1vSB+ebgYt+ang4ApdbnVf+

bwO8qpdpzCE1/V+pxl3xhxqx7vY3qjbX3lUUMMCNx3ByBUjVye1/wL6F/wTHhzDf2aH2wFfVX1gtkt6Z

7y6l5lsBMFwshdg989Bf54RtagrTM/4nr1hoH4nQAm
mxwzaVCtZgKCILW9FK8CTkaBLu+UqF5VVNpgmCagMKlt/Pn/QP6AUxZZ94IzzTeyPatYMS7BWdqcfX6X

a+L8xvADm68yAd4vRsG4aaLGFcGV+Iy/U4TtMczAZ8cwRQRp/96KyrQVEV9GSXuEVgMdpe9jt68zLXIc

zkFYtbPQiy7dExZ02/JI2NfnPhil4XMxx7tkCVy6t7
W1sFeqJiLrGWFoMbZ2sE2B8WqhyjVJ6R1kJaHBNy/wGit8SvwGdEQNBLznNWf/SqDx71mOPrlH9xuDop

P0BaCwabnrWgFPMlEogCZuCpYhlk79RQ3wOAs227qtXN6e+UGgKjVoJPR/CY0il3QvTOE+IJafETNpxS

JWKvzihyIOKPfZzTtD9SCMVFFWPTh63eIr82HWM7ew
qmzxPggWR+qMK1SGcohln/+VviWJqXPO2NXvvbMxxFsnqAJrUdiMuhqhrWJaCgg+WmzFHSR3R75unU5Y

ZrLZIlaUlYH9mRPM0uH8OiZbuxUpGeHx3fNGOErqKsC/KVZuhREQQVB6hfrxTBpFTT4mNSw7fTizgEb2

X7zyt+3Wqi4TJ/q564wkdFvlqQ4u3uMzkI+eYNdiQX
e7hMWv4rzk7xb/LO5tSEQd1EopjH2n4efcO8I2jKm7q53pecAnRRBt63GAn3RG6u/R7CDRhQ2oGF0WqP

Ld5M0J7zmufJn7eYYB9S6m3RqfdPa+C+oHTmlpEIZHjtCG6/+I5e17EXLQLI+smKjZ3suIJhkQ2hvfIL

DIla6pTNmrSGoPc5Z/qJLQ/vqHKII21hYKtBnODlYO
l7wGU9WkjeISgvnZMy0BIcmgLFrgZtZN/Z/31en5BtrsYaqY7apHKEOZBNHkf1usDs4485Yj5+6aE5bM

Y15AGE+Ni0jO4Vym850Znko3Ha//wuo5+xCPsA4c/zzn+jxQKQ7ocPTymU+3xz6itZnw2FYUP7BV6AST

VNSkf2ioTqoyAWSkNAxtcHHN6u4TUYB560xCVC3n8X
ntOyq1GWL4RiyWylMw1rkeZ8AuemnKgH8rnAqQPKze2CSC10vl6ZQYD8wfDoHD6qNLZZv9cl9SAoGJer

yT9BQj0rG/Et49FDbfrCh+wIxVaBeFghC7Cpf274k642ppn/iarTpB9jpp5hV7N0EUUJMyCWGOLWObPa

3pQfCnM7OBO5AjbK0AOEb87AOUOiyigPg5dmuoiD1m
l/7lopLODBtu+a5E+OdAKk2MXWCJ70w1NFvpdnaf0DMWYwaG7mlbeu/jl/9veznNzDpI1TKPC7Tey6YB

cK4uLPYS1CaB/q7IGo1tCHuiWfZDQtvhEAAa5AvLIhpLP6lh9kV8E6UMqPS2sUuj14rIsttoEU2+ya0L

HdCM0yyeJvgj3g/aTypweOZFxS9dPC/YpJtkaBZmgE
s8Li30WjknIaEnkYci6xIxrqQE3rCL5knaTfKSCqwVEIVOlNTW1M7CHbIE3B2anUuM8Fq972ZbXhtqni

/Dw9o39PardzKaR2uxhiRuQPaxPGRVXsCbmACz+6pXAvI5Ii5eNxzP+B8int2EL+tBYmm0ZNpAnVpIki

s5YAb5Fnyurri89m055e+jj1CHRExKLXaMvBGN2GiX
Li7XP2nIOOBjl9UGF1SbdZvPsEBNuGsU0wg/wl7XQPF+9CNLbd/F0hmYFyOAM6FP2/arppk+/KsuMsJT

WxXg7RsFkX0P1/TkITzrLPHhXjSfKbPtqKZAZcVeRm9JCaMv7jpT8zagqubpM8VI3FCqucoxmIEKn9xI

V/NXtbGi6MNdy3WW4hiMfEIMvK5VcwBUy/k0jtRZSW
Z2XLaK3IbPg5rkqHx4djGWHGv5MBWNhR/Jb8sBAs3i/1lxj6zZsssoC/TogT0+xR2/PKWa2m14LmvEME

87PpGQ4IBl/jc3XC1L9C85931Zfys5KRZZVsDxxX9JETOixQMMaWCvz2Ns3tCdN+B3d8reAAs0e81vqo

hb6MXVyy5oBZpJPSrWRQu3djaRMyHuxejIkHA5zONN
UPz+2U/+WbBRZvvGFxh+VSg8BzAlArUUmiStI6tcy1C5YMLx2MKbszsSQzhW9GdhL0gBPyzAhGr+xllv

VgAjJHDuiIy5HTf0NvY9uOGkNwKuM+3QEVmC38MWtaa9IfAyh02tmrN4oeL5UJZcwa8rHcpshCuSN1q1

S2ZMM2Mf/RmInndcZixz8KrCWftq07Atz/mT0RG2Zf
BihyRwAPXkufoEa+kMrFS3GX7xkw+/RzADp9GKeeg3Z1BhwKaL6pfBUm7wP14DFrch7BOovYrtTzghz2

j/0KzC9DIrLcsiNQLe72lcumr8pg8bK6d/Va/VIjdK9X0012juUPyEl4PtlLLdr73rBE+mys/DnT3ptc

jyEWc8fgF8zaADXoskyHxtY1KRQj49p/lesuqfoTTu
yL9R4++hg0im1GPXO2ktEP1SleO3BjVBLQXPvd/8S0fJPc9fqa16L6B9mjupMbACMLus8YgTuxrWsG9V

aTyFyo8v1Crri0MwXZQjxhbYmiI2xms8NDUbP7qohSKffCBHjPyW5inWnbod6T7f9JLyzjqR0wh1Zw2A

mEzqKV9/Jr2uOo+ZIs3AHpwVcCwF+SImtedNgmpT+u
SWuI/vQYWlCmOPZ8f9Fw+8EYTiT/35U0sUYAVaBN+K198aP20QFNpdA3gTxxmcEdkxa70QK2XXCzRqhy

xDJqROuRbyikU1qwuW/b1geH0gz2xiXeov3IIfPZcHhzjuPRrnhhOaZ4yTbHD44d0yn/Xjkz+22Ay+TG

QHD1WGDKl08R1AJ6Y2IFYfqI6C1qLkoCD6Jx1+V4ye
iSnjVp+sUSf4vdiQ+loc22Egv7dngdY40N9Y+u8Am2rn89lwBYl+395jy9u2T4/1HUfx0RM42KFnE3Ur

Gq7q+sPxJjbHzJg2S5bDCFg1t12iYMdggAqjwtKvY2R4MP+Tbc/qks44NhyDOP+y3clLMMkrwTghvdVc

kBdgLWvR65+qBcAknhgKV93w1hTbt5rvwApTYDtJN1
Xmu2sUVkkyy2nqbSf+XH/Z3iSnKHtQv3rFvkixx8kdtuxXa1RWy1sSH/g4hT5YQaP1kYoS8f3nPjGTPD

szj4G2zrKoab07AveyHPxeLMQAIzhd96Zh6hLxgh81/4No1V6GcD0taUoUrzQVQfJbiomr3ZCtQ3UzHm

+xX9FDsK4+A96/4fzpx7duvxIxY03JJSgwKzMM82f+
YeEwtu2nhV/Nw3mvAuVJu3RaEdEeYaB/gylPmVpEButGSbue7YG3NqfgKnep2DuPpMaDJbVOg8/jvQuE

hrImo7wqJ18Bg2h5WuvDIGVBpRyz5OW3mMbb+2vvh6Cpj3HaS/nFvaOQ9mhDMcgpi/4hINX60dBty5Wj

11Zx1gjrM0ebsWgCi69fKR72iT9RjfEsuTTwAAAQZn
rj3cAlmJt5hET08iU8tH7rBu4Vk1JDuee35ect9i3H4sdZ+cmZ9K+Oh7830uFKOedbOFrs62TgRckaU2

EXFD5vkFipvxPoZ6/tiC3VHmEI539l0zpVSEqXCBEMWfIxilA11m5Mo2/zf45JGCd9pU5TzfJ0dizQrU

bHbcbdB100v5XDEzVWRlXLezsXIbsT28Eb0cRYnaF/
zvZg6TKAxaRiqbNxJi9why7x6OcFybWhQ2y2PFEhaGsRR8/Su37KVm72jtAcsepuaLUspk8Wz49IQe3q

R61OaOKUAIG3ni298rRLbdTqfdXblB+EZGtQ/yDQ1w7UXthPDt3PaKQyawNGyY5A9JjCrMkon4I6QczH

Ieoc3MnNgSs4Fj8G0uCpl9e63XbHzByWCHwsOEM/+d
Aa4YfN4nZD9woTWayU+Vo89uOjKzOgP9U3LX1dYELHeqFw8/0gT8K+ykd1S0ButdQU0jNtfU2bcgxhwG

sMXnT3wq5ACLzn5+1NNS1bagWKVFNGFropAQ/+VVVoGrg7lYDBvI9HOItcl+LSlVDFjAu8E4lzRshKls

Uz7bLhhtyKseiJTU9uxdU+a1WPNG11GJmWbr2Do/Pl
sgrit3u+x9DoDmogRgIEoHCQmovIBtwvp4XAlIm+Qq5hxabgvEUBdB+CFM2S2hvwxwD2qfeCFm0bhStq

rmkSAF1D/HBZXkR4wvHagpPf70sWrIRlWOY0BCC2xDoPpqTqtkjWMA9kD1clc9ZuSC+8MKtPxS+IViT3

2d8wF87ICtluPzoI87QH7232RmQ59EkSLdmGY14hvs
Bnaa+AcVBmZb2unI9fFDUxZ6D4G0i8ZoFzeXpWsRoO3+6Mygq3RXtpJKkKB46YRO2EjeFeFS4ezo0/dv

aS9W6gPqb8h4FDtgOioVWz7BKWJNJC6pCwAgeWSs9tZiTMKvV0Q5x0vqzYPStbYZAVg8BhGXgBf3IOMs

+Wn1/wWa1bH6uJUnGwFlBfA6E+JQavZO3BkVQSrX4L
N/ESRn8LE/KdLMGZ+owcG/HU2cl/Xon2sfX/hXpJ7WbBMg9x978pjoI3nR+R0TdoO2mQgRwy0tU4eLyU

d81KxDv/QDaII2K2urISqIPDMgyUje7LBzcVCx77R8cL2Ozre4yzFwgAkvCrJyg1dNUzXnNPjy4yphVL

xMaZrROu3puAVmZyXqWrpjrqm62TuoxOvTIfVdsOTS
HhVvHYucFU4nC4Rud8uy0jTcbFphuPfvxJGLvw6kKIL/fQ3cDkGecMKxa0qFK/woayREwaA1QduKl3DK

N6IvPfrgkt8u19SWhr5AopT9x8O6ZgvWq06cDIt6qzL8j7Pj9KeYMz3ZSnR0164S4RjnuM0z7qzup6G1

4askm29hOLX2zFVxFqUYY5lsT3KEki/WGB0khOWQui
v67V4slK6DHH1357Ctsu5flF3XQ5asOMhZn7R3puFv/2B87DneDtA/jkzeh09xDmXz8byyP02F6tYw/P

o2vd86UoACUAMjVcTTawlfCrJJ7q8CINAyrjXEL51gCDw3gpTAMjsNaODWPy49lEnG/UOLGl/o1L7Nai

QWcD6El+KCjlLu9AqhokyKTHJuJu0MYh2KuinFmxjp
mhBQ7j3I024uNzAWfVWSNMOmdULPn0MhPK3Yk7bkrcnTqfruHMLf7hCtH6KuYxzyaOwHHyjHnBwoxSpH

RqV6JmAsd1v1wtOI2ggAQr8vTpDU1IJnXALOpiaLjsbX9uI+WVdeBkBCSuHeRQcFgfSry1iO1rlCPeUN

3fS6RwM2NXpWgeMzB/40td+CNkx1WLS2jjvw9uqfxu
mz7/7aSmednIEydSIzjWsThJiJKt3x7UbK0sYx1R//mbNWET3N8KJv0x3oE0iUr46/6oPIGnK+YOXlIL

9s+GCYcqcVcc9A6u5DAgtDM1r7Ho4uplae/S7GWnZLCt75w1sQkNrbrwNSKcQJFcTP/q8pbM8qM2whEo

qH7hq5zp7o0+uGhm8ukBKFG8ozt7krSA1peee678Ji
E/e3BobrnwqYriwOZE9i5O+HwVz0+61d5r/2UWFafqo7K2aB4is1xtDGaTFUVuB9qogJrb3b5sBOIHFg

7r5ZoB+8FjBOuZh01inYIdOwV68MA4k8XQysnwOG+vYcRVtcFBsjoIFfAK6mplpi6QaxctSdgz0CU523

mEyk944q3M6GyGh9TzLj+lxdabAD/PSiHfjPxcWlTE
+8G/UcTVSwPqlZ55rT5SFs5kRSpnxHZujwFdelGpGAeigC7WJhG8ydhRPxwiUEWi43uufOVvSPXc/NkR

5jqm7hfCjswc7mQxscLe9c5fLYIhBdFVpVYGT8JDpIwBCZ0bCO4NG0V98tcxSaDQ98lYQKdWmyAsNt+A

+w9Rxx35rFAsbFYdG8dnm6LTp0n+W/HrWbqSxWCxVs
HfgibF6Av6Q2fEkjtV4sM14XheZXvJbi/SIXdzj1eXi8JYoJvBB5jjbHsq4qiymKtMllW9cGYMKVzqzJ

ScNkF5nCUZtvqoBhcOsvrUTdomoBdGXJE4KDyZfTyR9hvJveyyqt83+dh4J9IonMHK3X8o0hLmGDvpKU

NFMniY3F5+7upmBAcMRnLWCmdX4oTkFHis1YqRH+r8
ja933fGhBdA1ER03DnezLhg2ibfLcJbijHw45kKzYXMdpBF2Vso8aQu+nUPfP1YGwF9lvE/qL/uDB8Ap

wcc22k6HnWv2Xr3VtTsfNBDiIxqat0m4Nrnp2wr/gSXF2oEOVOXWJisD0dTjy052ot0ewMa7RFp0usaD

lJDQn6tO2HnrXlNV5X9qyDm3+uCGjgWdyJC4FXc5A8
o4wbEwXnHHO58IfvU5s9XqHPaiXCZwWLIQud12NBXHLn7ThDBonCU7Z8hr04epINvcRCbmwQstlCgpUw

bqavaW58nd7eygGHIuD8MKI+c1oCichrzOGGD6nbBV0Vi7f01oVI/vQUopeYTUv8qOJCIc6x/fMUMLxF

fWWZAbY/v4TDzJaJdK1kpecsSTTh7lOsiHqu55IP/3
Jyh5OxNP88wf8FwtP6beZqR0q3Ojck0IIU50j24feOjankDAValcgjpz5OQ5hhaZin5WS93UwyFNtveK

ukekDEO7j/LA17THj5z9L2WQyAEec3Nabt2SfPjTFRU+76b61h+MNF9Pz6khDde580wX//nd69SscO0r

dNliRYJOF0Eq5wj8HeK9gMh8YNm5Yo+hpvZ6ot/vSM
iKgV2Z49PY2s3+6u1gpBVgM7F+ibYODYunnKa28Ren+mMEuWaYIOkYMfgvgO7OAaTQzRMrU8nrRzPa32

01W2wIm2RTx89S9E66UJAL8RQKw3MgO/6lg34sR/zSWi2IW81u9bZ0YGInQQ0lkLg2egf0bty0LU08JP

HDDVC5stqklwHxwwjrSTZdS7V/nJcEDU5ZTpGBLapJ
5q4enzCwtmaEChR8PxkHj8t/eMfslxdrxL9Mw3wDyy8ZCs6vMwn3yNMeJdudE+4uugJwx7NWhZpU3mW/

RXn0OFArRNIHL4mOe/oDTmkZwmJL4JENE68HRlyMg9kA21qyVokEh2mxuwUF/dbUakFFJw8Ge5MD234S

E+xY2Ep3GwddHKpd1KmD4gdiMQvBT8piwor+WDaVt/
XLxvkED0XF3a9v+yao4J+Zo6t7ddPpMNTCyS3ryQ46jrDjRxgROJxBcgm7wvPqh3JI+1DOQqUwiTA23c

+j2kaCNsVNpN0b/I7dmsnk/v9r3jsuEQnx9odGVOpisRs6/OGEdSYa0rUcoFSEyEvDv0ecGpA8LLoJ1N

dEFyPd5S6EjE/HyPxEMlOz0idxoC2LXa3f/fQ+2bZf
5h8i5bz5Nkc7veiU84wcScaepJ0yBkLVkwBgH4gnX/Y0qv8Jmc22FBBC90JLYQF1jKc5YR8Tvj4jj0m3

jNaks/LdaiXgzrlNpZ3FiM5cF4H87hXoAZjYa95zFBg1wyUI/67kpUisMnpxDCiydzMb9PY87NTPH168

svGd2627IcYG0FFyYe5z9sQBwfIjzgXsjgWOrkzj4v
xJg9BerQfFAQ/7zqxgUVSzNvkNwJJSo1qIxVdCTFQM4EneY50fYTec2rLkUbxqVr83RB5RP2hGZVdaWf

thOPUnnCvT/J9cX4Vt3ZA8pL7tBdyRYsWUwog6a6HFt6C2iO6u+8W0wcV0Q5dyfZWYSW8cnTMQPKty09

QvcvpTMitJ3zrgJE8og2iTgQNxmuKTYVilmZXq9uHr
Carlos/A85CXsT10rM+1p4fIc8x3pKWfCRMuXI83UtZL3YoDZpD1l6nyBtyUxgy0zYfFD2OCes7K27NDvut

RfgPAeCQ1pDblgy2Ha64oZj3Bm01R56nu1xPnv/8oJXaeRElfEDhGRGmSDmOqntAqN/1gZo3rfYOqRus

RfmezNywH7maVg9AwC6hd2/tS+nArFxLD8Ec95MxB+
pvB8LqT83+6asMPGKW2zAjr7B7fbS3cwWFvc0/CcrrzchQBZ1NhpZv/9Kw3jqNMJJNKtSLw469k1Qix4

rYsHJc0aBxe1blTJYGMXEC239RtG+ojW7iVxFhyCzNzj0eQxpPVC8n1z16ftJPol2Nu16iDoVRmPjwMk

oWcCUml9ODTQRI7DfBrxaTXbw6B8/CwY3LO2SziNkq
KdijZzU1xfpWqY/lKm6N+BnLoPHzmE6HmszFCBLe6GU0ZXUU3GvOwpA8wuzkFopwA2FCEjwQKWwyIkY6

gT8mptzTfduemmzNQcz48DiZv12iAFaf3YAZpqjrhzExPyiTK/Sgj6XNBcFFXNAuLmD0onai6PODR/3+

6wNSzl9va7AcicYy1QvO9n/xoeqRah1sDQv4/b7xH+
B1M2IO8aFoAaZ19firrqGO040NpYVxD8PRfppOTeO4S5YGd7fqjdNnlYs9WD6vBBvR/ZrYPUv1g4iEeg

00OBtM6hF37LXQ/+/EpbPQsntoUREic4M5RqhkM2qcjTm21xH/BZFHAu9Whhol3StkaViphN/co1xmT1

URON1K/Vlto71rUyCuJjlGsada6wXnHbs2LJi+LWQM
Uz5nehgKgS8VBnV0S5LlAdnX431FCYx43DarJc36mVO9yyqoHHt+HCh/0CnkeBCHGU9mmh/4sLBtFfBf

F+4OsbISPZE0ypayjLbfrSi1cu0HhNnR7Z/8NH4gsa3IbpXg3+MXjOMdbLep8x4rUhsCO9k8kqla/LFA

2IotQ1SYTgNWfYsi/olx+bR7/+uWqmXNHVeH5TOUY8
v3a1O9wJiZhdjO9/Roi8pNn7iEUZadtenVRbB20KrPKP6DfE/dm0P1ipSsuJf4tnh2okXFNfv2pf7qjP

xRlkdPXwZ1NTYFLkjjPkTi7ut07epdHXuRvII4dvSQJXAL51LDh8wfql9WfIbA+504aW6gnd08qO4u9K

ns10pQ+65ncNLVu6Z6o82gXWQ7+OTe1RSn/Ld8karX
paXhA1oashKskJ8avdQYcz3ifHY1aNvHUzASg6IeKP5gHRwIdeJnYjHvLAtkcwnQDH2puCXfAFcRTTSv

9OytI2HbZKg9r8G7f3fwYDkONMK1L2qMa/cTXbVd1m8taLUvR3iot79ojs7SfooPcpetIpLD5fsJw8R6

s6PCYiUSHs9eJE4RREjXDaVRIN53UPg8P2IExR3CC6
0o2zKytT5wQkUGCpcHRj5MmLdX6gqFihLB4pHU5yKtkA76ybgSQwHlBu7WH49JMxMQD6YaBow9l6tBzP

K3g2Fh9cxaz8wh7pMN+37i0oHk1xxiTx8qzAFnPYlYlNAT/ApaO3Ee4lFnWl01YgXDwuTzSWyG1U7HfV

IGCaZ1lxVbmRe0fDsytVrBeK5fr/2P4GXd0IkpqXpk
JQz75qX0SnCh1cBAIh4vA/whpjDncdax+rWQAOnklpR3Ktv/eTwQTRpso77QuzIaLOAau48nmF39Rk4L

s/rft/PjVb44zWd5qw3Ahtat0EACHTTXKRCPcfcCqQ2bGcKpQF1RfKTbMi/5dwu6rtGI6Yb557Xi6Fc4

XCO2iXaeDdL0BH1P6ekAf3FBc7iatcJrdIAo0G86ac
VrKQjj/nV3662IUhfWSHgW6F0Ie2Wg+ENam4bK0QHd7Yp4cnrFLHzhrpQQXbdR3mwYsBUctmw4ZawICe

4f6WHPG3shDgC3wQt3MQ7gHPHGMFoP69oYx/dGImRfMRmtNZZAF8JCkD0tLfVc+uIwmWE5yuWo384+tI

O/Esv4nJF8KmGbPOQdvL6GzD0gqSiQzVa3angnb6+S
EOm8YNQG+e4HSKW9G8byfCpBww726K/AFktPEzJ0Iy30vo17wmcBwiMdih76LWLJfCfT39dYi7si769h

KsYlKW2opauABjtQAIboEDfhaHQ5AEwdOmFSFlP70CmbVH55ncVwyMvvENxLHt7zwdxsaXiwj+w7TlP+

iyJYIlp7E2FNV7n8oPK/p0D+7glYcO/HTROmcbmC5P
EmfUGfFaHTu8PhgYq9PV+9VyNj90d5/vnS6Hwyri3ErCZtr9/aRIUUnGdWL6KNbwToLW0tlVviRSp9in

Rl630aE74K6PotS+/lqZJXeDRQ6bprDHOoW+xTL0trCUc4mW1Ujp5+DYo/7+gJ5BAFTiDwDW3r2hQhGm

2vqG4pAODnTJLbEEy0A/cb14fpHeQ08p/AdGqIpBM9
2W9Kp5gdmJHtzOxY3SH47H1A35IlTIje4E7xlKXGzGLn3KO5VhJChMRLecq+TF11mUAjsi3g1YgGVsgP

7sTDqFrksbFL2S723elk91Jednip2H6IzfwABNHaSOBkS19THHeCbSj2k0N9ttmlxpBnvOPNL85wt6gI

XxmA1HUNLoiaaV8yQrr820gYW1GzpreM9UENIYIL/T
WusI0lWM9o0naFmDWQjUNp18j75miYB/y5BtH9R3hj2kgN0h/lygLmb2hPmshd1198BcThWYJUJ/JxFr

+3H3fpG2gnmdqiZjur2xXm9e9VxaM9sfPolkIUtzyxD7rUOfgan0RXUy/W5SZUxN8/Gtvu5vBMRMLHQx

Fj8WUb8K8tZsRyaHPlYre797XkSGEsPMUxIC226xlx
Kq1ivFmrHWuccSmD/N0y2ExPAGzwpfcWu0BrWzubUFCFNHdFZyltEyl1uFr0H8TyAPWjcmSYoA74F5J6

c2+Mk70V4iwIUYIoRA5IAwMz58KcgHOTcWcOrAgZ1da7zJUlKhn9jFYmxjOzrI/Exyj3m9UZPjHOy9x9

2JutkV/mY8B4jY//UTtXh36R9WGwAc8z+824vpp6m/
3FPhfZQo+0eCl5GsBT3F0aAQdOX+nr0T1zwaDx5Gldj/uqr1tsd94StGJc2+GPX+bqtWbXfYPVV6Rst6

S8p3kIEFK+H2z9vUYRvhlZzbWSKplOBGPD2Be68/flW6G6m7OQTN9oP4TzjFfHKmtjpqijcU4kAS/XV+

mN7eKrSb6jF7zBpEoYCuYD0uj4DDuRQk0fTWCeXdHs
mdGSA4okKGM49/Cx7WGD4F7GhHCUIh/6jwRnYXf020ViyR98HxxzM/y/lKi6E0Fo0Wg0DSK6lLM8J85J

TzNXnq3bL2rA5sOeF8j8uj4CJuX3vPYxmpIvx0c53OaT1K2IesD5UsGFq7/BUK6zew5IZvjFN99W7HtK

QE758K5UYM3Hs2KQoX4O2ccmCnX9fJuFjNpqkYuk/v
nVPIZvdiBpI8arUOG3AMj2v9yl9ypA0xuHkUaQR/j/UYwxof62VZco+W3vnfx3DwedFMNRqfO8uFA+8X

tWf3EhsgVH5TWT/jVOi5hnc+H09RO6vlqujx0GPJuGdZw0CiDE+JyZa1B0S9SomijP3cv2FqU7fQryCn

WwuyZfnA5x8fnw9N9MCA4xuS8rPCxxczuZZFSq0Mgn
GpoX0EMsCRHlkyl4oShMRIONpfBFAfjurPWrDnDYinJD+d4usvVxZ41M5FdUk277iVc18iDUUtDiOyPF

DAfp7O2+Fuew93zW36p/98nPjfDjS/ScVic8vXVoln5UFr4sCHQLlnGwKFJu4zCZgWH47Cpf1oF0Tdsw

3Ob9dkA0WOPv7MR1MADkRHGnq9ShZcjdDeNZfqjpi6
7EVDDQBOK9tGuH6MJg6fi+1xHzq3vLgCIcTKQ7qQQLETRiOUW4/fYA5jYVs8AlofqtSlMAeIudx14cEH

fPezfhV4KUM9YF08HPruEiG9tRXWmBRJNcwlOcorBsTr4QtAp1B4JKpIKM0PJT7C/9dCnks8uikmxa4p

Pqwjvu1NuvKVJzpoA1SXEtcmDb74KZOiuZDHslWiLa
yl3uph/sSbSh2hsCrpqs8TQnVGjUNj2t1Rruawl2HP1n/L2gKVmheNOKn6b4fx8ETuEpYGsznMLMw6P8

LAxKeJoEnRciU1Q9bVMw8skdmLoNVyN9DV8ES0pxRpgKhMxkMFkIzy18pLpgxP9wysueIa0pwVT7Am4K

OPYAXoHpBRauF7DyfQgaBEl3adzk7awcIwKxWNRdpD
Djx7FksYLOqR+zWBo0HVx6W2eIKCAQfm3UID+wJuHzVdKksMfdUnuwJt/7YIAy9mF5jeLEplbmKT1lU+

fggrMBFD1CoCjlrrwas/ELSWVfZgqCEFHYQawGQVUHEMkHHuV707fMTcfjgZ/aqDSi6YTurZL2XNBtlt

gG17eA6SMtAnCArIzxvk2/k5CjQJolvXKDShbKLGcT
HFkboQEvHE4HFY7Ogb7jlT0mFsQtEJkNok8RKZFE3UEMJdq4LC7TRMkh5oL/JSrTEgFiDm86Wi4+eeOP

KLkd2CK2qvIy7+Q5MqTZ3s9v17ni3L0rZOl6Csipwz+xQCDCv3zMPkld0e6uQm7Hl2af3VS7ptFyT2Nx

no8bA94osvwYL88zW8b+ewMC6jyyMgo6jmI9zr6iPi
der7agF6ghwuQqiJ/mQwg6mrJWbnNBuFbgM8RjNHw4L/ZjMShs81FU5E0wxqArOb794j307tRqI68Roc

xEpAjl9qzfIlkQ/H5gWeCmMsf3GUA7POd1UdD2ITinTKsR/uRQwrA3f8HIHVJXnmT/nrFaOIuL95vgd8

11z+ml8riUmbTHI/Aw5VNiexfyCkJWkhgbrQnnnUHE
jNncKkFPqYNiYeiBPEK4Mzo3mkcj8EDRqXz3HDwmgkEtJ+fwz8x0AU9I/woNEwtDwu0Dm+Egml5Nj0TJ

8Ai3y+FEDmzAQDh7PxjCXnWRYUfWIp7ksigsgU0QGhpXQvjPHTIApClnI85rSARYHZhAJ+SbR+dYYoHa

etVQxSTdulX7BApgT/nLWZqrCchXWay/a3r5WGzc9M
egSOCMJfv4mCot1BMsxL3Wiu45ylqLA3rEAwye4VNb9yvh338YTd3E1tctDjk/pKDdDlo1eeSmcQEga6

as9lErVu7OA42JHAHz3akFq9uk+bx/08s8vOduRr3BWx0X9X8sh2WGayMkrsT290ZIaSElsLM+P3P0jb

6rBeA7lLZqmzftn/dWzLfxVpBxicnm66RTwd/ZrpLU
Etr73F886dj/58tOp/v/fYK17g6G3Upr8b5JhhTL5tmi9ab6c3v28WwJitQvgMFTBGtkazufWN+zeUYG

1QWZLkoqR2leGhFGO7KjHuXEktEm7IkiyVeeMQ6l/DM0pJn0Mg6qew7OSv64EQSBsP7uUEiu1gSkaz+T

C/cMDJN2jN9mU0ukNhGlESor82VytdmRg7lwQz531j
xS5n3KV51ES93+iGwMYBcWRhaPjEMriDMR36b42QPSOpl7LIAQiCD/PluSTyfFhLfVAPdPmHxyb/qv5z

QFwO877EAunqOHnHDSz42Md2OtJH44OX5vNsuWaKyIwRQn61QKo+xbt3BNQnrPTAwnJcgMHBKy1iT69k

2f8rtpetzk4XLhwG/Qf/H+IptzNSggO2CoNG/HYoY6
slzjGPIIUNlrwAO3u2ZercVz99cRjTy5607kF1Uo1uppwggRSd+1UnZXItUoMj+nPTLkM36oB09IoNXt

YMF2oZ97cfbF5bSfoIi/gAgpI82Aiq+5kLGHkgCPL3kOX6xMeekV2UYaOBz9akQG+p4bO2Hd9zFuBqVi

4D0KVIiMg0M0BuJXJdD3gp1edexpN+qoRXFiYQ3EV2
QOD1rpO+1nsDyArGXYiIRYXTsZCnne8EMFW8ef8z4OfqKHbMnvtKxrublPCJyjst0D7xAcCfB9mUvgEB

gNp+HOyesNsmMcWMRLda1lwyyBy8E4SVB7AdlQDL+29N7zxSGMHY3mRdecLL+8gm6yTh4Xzc4AWVyDRP

1mSrBV9NdAGrgwyoYBklLVnP287T/gcfyI8/NGKvHG
hx22pcecSZ0A1QAFGlRmHZ1wc1MGhZMozmgIAzao9hI9fY833RQ4O7sdx9apq1vk1q8XemdvvXu66k5+

FLR7lDu+felCcrhJTuVtukgU51ZslKFbUASJY9U6HaHRGkIHwBk6yz9gUf2QkkqQyxQZx3OD98Mr4/+u

AG1UTzr5GIwkKTidhJRfy9+KL75scbBYlyYN40hUPD
wo8i5v1c762uIuD0AG//5rdq9gR1XkVymceQhqvwosXSfMaRmskTTOGV5RYIYcXFsCNXo/KtbC1o+bj4

W86je4iUpGGXqF6qBA/uqhv1WAqu+OAJpZudgj7H6TzzfH7xcQYo5UXhclOxW9fM8Ew94ao3THu5Lbxx

XdMxZ/kyAUapxxoWEz6PtLMbe38Bmeza60xppvR8vd
1xpEqX/fucgThPTLidai+4PkQ2+BDa7Lth9RRa2hkZKpSaHvVWFcIsvJRc+X62Zj3lB7j4tjzmBgaWYR

qlT7aNG+cQ/4lOVGgsns/8i8Tae+k2kTtFBISTinXSW5+8aS8gy0Vur72Fe+u0YbEtVGEVAE85wh0cBQ

fFc/83vgOK/w1ZTDI+CmxCLqPQetILHm7ky101Xlbt
S5E62aT2IEtaLeCFiFgC7P3Z/gDNHah7PLv2YbKt9yQ7h8ZFbUom+KtfF7pZ7wUfOlAUwl1KRBzBTk9A

eoJv3ElfV2k4w1EsUh2PrcXxcx2vXVh3E/7UOx98KFNGpCnCR+t7Jjywej+qI5qkBEwjzPoiADEEEr+F

4Qd34rkYbm9Y8usaznt8ODzyIUqUFfXdNJcNIjn27h
yoiVVbAy5LuBCX47Kzw3lQ/2+RYfLADh5XxEXYzZwYEmG9HMJPG2oP0EwFNBNjU5VpZdRXShvHRD45L/

8ub7+uMggTnsBl8+8yNGtc/YoyohG0AAO26qs8/3yOaaTtXhhG/pTd/B1Itbn4X4fhk84HFaCql8ru8a

GrKxm4ml8hT6Q8aDj+MAOASz7kb4KYi+Ki+/U3TKCt
yyqLoHRKchl8///JoYIxmwKFYf0IwEOVzZUwX0ClLIcdPbLcVJ907+JkiAGLwuhZ4hqmoog7N9xrmeyx

1irNZ2wdEDo8ZgDD2F09a760LY1v73Pks2o/JU9XBOLu8guvgk6FjCKDaZ5+PU4S+1nYgv0Z5sbCkrPM

YhKamJ9QVNtU2AV8Joh5bp/pn9L0v8N8tFTSAGv/Qk
rRx5sOSmBNiDwpMv/EAAmdUlJ9JkTRHhNNTrszybXNztNimk+DsCQn7Q7rUzLSccd6FOApeD3IZifC9A

LOt4NHCmOv2dLHYiYwHbcYKjpSNi3A1Sjo+uIGzqOam2vMDutqemx+C6oQAzI2QqARK3KYEzGgqehFJc

M06/zKOub5nFeqh4ETIWKN95CkibXHjnqrCM75mZHy
SSg14yGojWwyEF60xJls6d3Xk+jKzjqDkEyq8RrT6RnBQLzP+/Kpe7Y6sISMq8Gz9KH8pouTaCaiL3yW

g7IxNts+bEc7i9hCeaNqkwCHcbm2dUQLktSV2xsnI/vS0uoC+ZbThFFCm0cB4OUcnq3PlvQ/h41uEwK8

SIGkCOqE0iGD1plRXQ+6YizhuZEbJdcRCbvMpRhOYF
i8hZ7xBPtaU27UNGfH88meqB1QEN+IJVtUVvvbW3PKrW+zo2mpCfdX7a7Ndah+gLGircWjsk42uDP0dp

NxA38cVgx7q2xN/Xqx17+qKKWAe58NZSH4Z2ndCr0FO2L4uH994OwlydOcskv4h7SlvnAT+t+qrTLVBm

ft3rIiGeX6gpuVvhRjA/t//XtmwtVvt8aIe/r42nDp
7y0zHCC5E8do4nakCj5fRua6ICYoTbiytD463XM3q2iE1IKtiP/Ez/5GeYRdq8EA+19d+0+nMtss/x52

jJu7Tef50USQZ7NxgFzRfdsEGaXwl48lagKN04BY7Mt35qiSMBeXLWD+lqmKKT61qAhrvEuM7I/fmBbn

LgA6pHse40T4CbEZN2sOdQxh/c3tvQgbvlXJvt8mwt
4gTwotxUqLB6vknDqayv4oCNc6CM/VMnsdOId1qPgQQ1zFMnrSCrRY/nNu29cXeBtN39jqFGnCsEWJud

6/zPRvFnP/uubXwWpb0uhTXSRkeI+KmKcIH3dseW6VFKN/Q/9xXbhFVTL89QcbDRUJcEZlCH7ypCjz2A

vyi/iXlknt82ky2ZpxlNY0N/2JXzCfS2bVE19TJDsb
J1QZZXEjs9poSTuJI2a0v2h9qdTMq9rKqHblPQ1vsP9MgXslofyH2ftodOP7a1gdZ2eP67qH0XL+/kkw

wfkfFejnjI+Ao9ClpnEgRXg6sU2aaG/ZLAXifQ3CJdM3BVbqVfjZHmo0xQtVOMI+mMDJE5Pqpr/4e6tw

xqM+wjLiUpIgAi2yOnOSfJqBO8UBWmBYDVleYY7v3k
Ku0qgYJVa6gE3wJwLuQ1hvme5+s5jryehPgwBk8J9/xYf/vrk4pDhfVeMrBxmqrlXwkrv0jUD9bwKPn3

VJ5/xMpqqwgmNXbfrqcQTMJSGBD1Gj497GYqVGup919LnYEzXyHuSvzBS3DaFvNNp/Za8G1Bj+nmwtDZ

Q48HcoB9TyTEzqTK/vV3FMAHV9kmFgSjDY0RZofqxS
ZhPmiYMMzh1/DnSYfr7uMcP7ieuv5JvVeSA7FEw7X2fFkVsF4I6Bv543lykVakEnz5sauT6rw/N3UMPW

h6hRgMDzmB6dXvoRfzcogJE56IDCR4OsFa1ZUju8TbhHLAJQ6PKPN1TBF/xJqc+ug2ZqM+X+Kj8OG3cu

6Wpk3z3EmPYdMiSgOlNaYSH6p5IBtK5U+z/8QL9Km+
QTLq48nPEhQTgAYQ/4wYD370DPo3P6BZiTNRQPEEigXwxUK8o3/9B002yA+J08YvjA6PCv1WobGl3uK9

s5kAf/KYX/k5NrNptUBOxsp/Gth8jMfb4Bs9DcznQ1xEPNjwoaYHRn5uiI/weg3acoSV6et7/bS6P9MA

clnRlmQJnHyAbmczdIdtejyw1735yy2JqYgCjFLFiQ
hixb5ucONVaYaoTL7p3e4nji+J/dyh0Et5rk7z01Rjrd+/qCpzUjXKg1y2sKfM1EIpS2Lkp+l4wxjmab

5gLvEeZ5G/9ZuQnYlhqP6guUG/t1PNTianvMjjwsp3C5hHdQVB2o5hUs5ymZQGYdP2L/C3OOEiR4rZW8

VYinl4CEI3u1SKMoNQbH6zc2IlVHAHD/baXAIC9zv/
S7JB94Sa/QaqJTbzr7qEREWD4wEi+W09GR6RyIX96AARqx/Few4dTOylQYakk/x4MMFHxlai3b+/bsSR

QCvNum6Qm/+yYf2etdr1LsTjYzgZ1yZ2V/PlOrd7zx6XkDL18Kpp2q7GUO6Wb0b8j5peiiHemcLvnuso

mFFzOr7a5vsxWGt/E/AbZUSCac3Aw6IAeNv3bIsZrP
YO5IqFZpAzqL/GMY7OZXe8X8GQfhC1x7n6ouL6dGt1mHCgMGRAhAvL4l5sf8FVAE2pMHKba2eFj0HhHR

2R14kxxy0jVICXRopYlwB1rH9wmWbbP+7jCSNIAaQGH/xBm4dqmvJb5XFkUD8nAQfjRhRYo9YFzV7iUi

kQlZArKFTdNFLX7V7S91mYL1h8G1L0q3c7WxGryX4M
/q7+u8xiINsl57wTLpT9SlJ2j5lyFisWmeQ1KBSkBCv2Bd5sfM2IMS0X3R2J/EeS/cEfMiilQr4CMM2f

0Kxf+zuT7wKlFO9rIbQK124obl2VkJRISjbZWNJMUJdFWiUdQnlVtYZO/u0iHdRKVUC4iqNcdn+98yVG

JSZGk3FxG7DkUdZqh9IZTnM8faEHmwqMsYVbg/V9va
9XxYtBhWARIGntgxrGiw1atuEZjXZlvtsjJ+VI9pMOlYAmUigK8BdH5lCWe7khDZ7MwGg5zIPJXSroVx

rBzFIyyiR+jJo5mzTNW+io4fcS5q6ACVylpMy/eBQ7MluqP2xVL8aGXNs4J6BbnMBKepw/arQxiYkPOi

ltfW6LQYygVeT21FOGqkbZC9O7/zqrExMhA55IqaNO
Ppz3+p1n0tKxgFuJYwP0qwavJzrWc3nxm6U6tk0BEOqocukkoWxmpmVEAAyJrbIXUw27DRyi2hR5xcXh

kZ7uW4vyCyy23tmuaq7/rfvIaHOiX9llt2vBDcxjFx1wonyWXifeUv5/WUfjGLKy4ZH8iFPdpSvsUn4p

yiW8i9Tq1Xxjoy0sdHUuHnlILLC117/rzWWZOWLpiq
lkhpq3tiIb1U6qCGLk0maQRzkI93eHSfPez+BfQomMvK4qLOnvZFL6BOg/80qPCcB9fFOhDz6fUmosOA

KjGSMh+6KEtN9+mfh6jEuCqSDFg6fTPJwdofqOtMqals4hrfHLmy7zhU5KmbL3g0zYpWSGkuMK2kNU2b

kkKQ3w4+sKr9Lo8HnJ0QrvkJ/tZ2bH2SINz/H6in9t
PutWReVu4hKd+fM6ksWBryWiZUK7TdzMovyjSiEf1GxEZgI5xn1Ff2gJeQgXn72daGgyAe84SXr8TT3R

ac3H4c7bza9ox0w46sgPwecCz8Ygxh5NGoWe/dxX0rHU6I1EmH6MoKiTP08QpuoeP2tVR/PPRlk+4PFu

Ta2BYCqO5k4MI2CxyzWlIHVmm/eEPRnaU3mS7LlpLs
Vu4qAU5vRWWErQwheIUNEy8A9h/1wBJVv0o9wJENYZr8551CaVkHw8/dlWbUwzDzm1J7MXclVKzc3Jim

noFbqGpPUk04teTi3r+K06+OP226IHjC0tJeycqnNG66oVk2tty8W4zjq3Fy4r89tl7TAsezaLm3QWs9

zp+xyxfHTch2GFSdpYIDcKUixzIvPerAb9xKJmfrYJ
1NvGTtbqYlAC/vIAuptVK1ZjDZLU35Nyt3VlfpKlxihu7dCbGuyTcWU/NkkPf1tIpODG6kWJTFRRTTb7

bJw/aA7pracE/UPaKz8KaIXHYvMlI3TGozc4bFcmpqHhFJ4muO6IgzzbVJw5PEGijj3XcXHfEMaKCiFC

6Qp74/jG/ugz4BbvmgEZm9RrXChVvmlsH28/xK7NGF
vCw1LMA9HWCxi6MUmRs6jnTuL+n1CoYQoszv+V8xUJVah2xPyfbP0yaSxBQ48qHC/lL+OGE3kOvhRCLv

nU87yoVsRgboEwXZvrFWT5aOD+4JqqzNiuAwyYAIpwfWwu5OdMaN7++87HcjjUB2HDQkyAzPEkXjvZnX

ZWBHNFt17kPZkaDiLudyHcNTcfXPo0i1T0Qb6TgL6z
huh7K5wT17Q+h1ELFX6wHpreuYYCBquz7P4ikJQA4owMSX2ESfsn8qTBS2JYrsCgtnH8v41tuY/mGg4L

DH9jEp0eRdGQb/p6fRcNHNhAfzn+ZWotlhWlyNfrRj2J6XrMlpjGReW25evHTkvAQYeSd9nQtLILULV5

2AGhjsJChyCoRYrEMnfS6kGDPeq+XtRNmYqldKoD0W
4YqwLZq7STjI9nVU516EjlvKP61sjNAKT/GYvKidkm9/AtP7S40in3ElevsM60gXhkPyvoru2X35R5Fx

bXwAl00IUTABza2Vnl9gcWNneXnG0cbRPZa1qYuV0v2hMeeiPjyRYlv7h5bbK7QuTNlWvJVeXxcD/Xfj

AGGdt2pjCgKBfSUTFeoZTmJosXdvnpjRHiyL3YmyHq
CPIfcy8D+aHH+kyJ6/8qkAio0VzPovJqV7gKI/WhTO90O90v5cMaiWXnpZlRJJTu8Dm72rmA70sIJwrM

+4ELGQErSaae1rCDK/TnlQ+Z28a32fpu9UQ7spu+UApj89ivcFoz63zx9BLd1h/ukztfWNpzJVMl7vk2

WeOYOvdtg8+ZR/UowGBr1BREG2aoHgnuAdZfGAi8rh
3jZY1kt9lzXGdXyLjgZ7LSgK1lAay6SxCViVwJSXs3CIV4iXMtXnVjqJ8ZY1iygVLPuyR6WZ4nIfOnIH

9sNN//48/03UcSwIS7mxSMvWWzYMpA3wnwJYjjr4P/VOF+jYl+Vpfv11Q62NDQCUt2Ftq1esID+XHlTV

JP5HDlFPZD7NA21/vuZefxm7jUC6hqw0FB/ymAIrTg
SAQgPlwzdd9vQBdWUreX6VC6uDo/D3ghAk74noTV9alpsiVLCiq+frUcR4l46ERecUpoEz0njwji8Lh6

9NYZqMid28V2YZjsRpRqJjRx4d3tTOsg5TYqVvi6E1x+1U85yAmXkjoaDSdj7Yxj13oqFjxKSqHcdO70

0Z9JbhPnZrYwssc1C14I3VASF2u2X05OOQFQ4cXp4S
DmtpxjZuSvjql3986cQnD8A5caZ/26OHJSwzTmIN5+sFrhbHFe9idsISRe/S1/HtRpuxNXz3Dq8MJhzM

cWFyXjzHTTF29XfjRRuaUwukq9o9PDCywdpsfYCtWri5O1ZNFYqxEEuCyEWvh7AYeyCShXuQ0psIezKW

De9H/b5rXxaNNYf1UYJNPn6pXFY9Gx/HdFBl8h3BNr
Ej8X2W95q9KUoo+F7nvxHYdEShJA45Ay0pmp7LNX7xnrQp57ay8Q+Pcxo4P0jet/eOzALAfRhWOIaeVm

a2/ravR90/4Oxm8nh/fGmoz4DmUnaC+ZwvKTdls0HofwuBCljjlL0WC8sGunEVIU6zJKX+nLIDbgejmz

4x9LFsXrXeNqtCFQyZbeUN8iJ1vm1MBeYXxJ5tnDr3
7c2BnnWxeHY077xoK798JhqCwRfSkvrxGD83ICvSGC0Da2wWtgvF8dSKwQAPa2A8uR22UzjEQoFI5pRs

xA/N3hkFeIs64TmW2NEg61HUgiNvpruixm0614S1O69m7BYxke9B/ZM9WnaDTrqiJ7zUbIJolo7tVjgg

X7EDBBL2tw+n0LI7cNcspgt0JkktQt0yPAlFztzgS5
MOVuWMxzzo24wyY7I93wGK11hFkKGF0XVDoX6A4+kaTroOLc7kKzLKip7Ox7NXRWBwm9JZRAGx7ep21U

mmpBjXQPo1nNLrUIt2imwJfNEC6d1+dUItpXElx98z/5eqg2R0+fbEOfFQekaa5tyQuxgFcEpdweMRz3

sKdb+INMZvdodZSEEvbo6Nc5WNO3iZXu4g0akh1F5a
EVpVaDQ9ijMKBl5fFCa0CimMFid3DpfDXrqIyJBctAVSxEzCsCH1f1e+mVgyooCVm+04L4sxjBATadbK

a97JU5g4xBl4tkchoGPgNCq7sY8aq6t5rC8wlamVWqPVDENc36pkJFtn45GTbc86D2miEqqIfsQwNlrT

ac6zBZGE8cRcExdH/McWdmIN/FSSsU3x143hP2qcug
K5nzM9ViX6wasdCJQu1WJLv5+hEMgtQsl5AFTHCPflwoBbxwh2L/t0oAGtC7l5vTy/xYpcapsAgi9blu

K2YT0e1BtHSGCy4DnvTy6cNsbih/GyjGtz/Lnsl9ZS1lhKVovnIRQLSIpyMFpQnKXDJQJ1B+DB4TIgmJ

Q5n33CHpQ+U60aQwnb4EcnmdjebklUjnor2+pUxu3O
4cqMO6Rrqz+4Pgjr+s4yia3okSov52sTwjS/02OVyZn4mB/VaDGYfciglCjGU9LAG8Tl3DijdYOOa3Rv

3qjXlILgElRzHfW4Spbf9I/eRRCK+2k6QNz3JnS2BqRxTZtquTDN8/iWyYs2UamTXP7fhvz1O8nEHK/k

ad8oGsn2g1uCHGgXdKQWCh/HzDHlA3jQMw7sxsCZ5U
v928E15+q0dl1Booqd4XhPbvEF25XvBUylFLvRv1CfFc8FV12qibiA+kHt2WwgeWfDwM2T12xkyItxUE

SCqOlh3maWb3eyX9HtfGQUn7LvZt28aSLoNGryurNOaIfEzkKFBylTKSmw+g4Tnr5StcHTwpu8p7j3G7

YWIxnunDjXswu3n0DfBkr5VOKa19BSba8W+p4ufcnL
E0xSkuMurhBZP3sfbxHxwKkplAnWoSENCbnB7Dtlmn98y5BCxjYSQn5jjaMrhrR4vkhldDJA9z8l8z0s

kKsMFarkWIFDdRWpcBKOmDjoP/f921pDi4dYjaT4u520XPgJJLUh00lpdDEsXssLAuC6srrOd4krRZwO

ia6GZAAUJfVUeodrluL+mBMwp6448ytgurswKeyKHq
NmeXbs4bMhBv3MkMb53j0e7L+aCQ08uJw5F3AeQOypay/5taYSnpCfMKJ9n1P7DNf82Ruj/zrdSsJf1Q

47+S8GDLO3Jkhgohe65TlQNPY/OBniRyaW1ZddutyiGAjVqwluNsPzQPA7mcqtqvmnuKEKNZDzzqMPGV

q09QSZmDtLejxuh61FlOfaUG93RIoxnPgZ9xM9lwdO
1P3/ktaxPsss56/RRpzsJPClZ3gCe6+5ur3WcSNnH42P32qK5/lONXF+dn5IW2OMOG6S+Hl1eaAx8xVW

tFIHss66kvPfuqoACw5P3LBZtQ5yGo7qQOUYi0aFErtUwbVaZxqwxvulLhN6pwrCjxyneaQAH0WvHjvn

PGijtaZIKfJk4xdvIGP99BUpApNZggAWtd+lxNAAP4
96qnlM0+bLgV1jq3R0/6P2RGqv2JLjyhFmRMD9qa/MBUD0Jf9gVSK3zpBJ63pZHocqHEoTrpoTMOSBve

Rf4BQP/pHfDvwR4HltWSI8WACepTPwB9YdbCY0awVLebnaYWvPkP2R6FwO/Km9uvvHbx87HKxUSur3QF

uLXKYdGUgokU4WpmzYeKLTgCiEd1J2DpW/wPK2dvGv
n8VTMXJuasCUm+Dnqhz1g4mHp5sEPsz/yMi1sBt59l9+gX4HMj0v6N13X5SFJkLNDz6AQDGWdr/gARXG

382Kx+2tUJ/YYgmdjJY8bgLH7UlhPkoJm/ZERpdJhtg159GNWy9abCJoonSJsgmB/lD9AeThlAzRsJrn

akN+V6qdqyUdqAG0XmOkuWysl2aR3PfZ3DyXK4qVNl
cM4ob7sjMU/kxXf/TvvmAwV/aFHFyZXgEqBbRhMI0g7PmAJgmvsuI8zfv9vjj6Nyb3rBmi1h90pG9VZ8

h/H1sEanpE06xBQej4TLINfIb+k34QCYxBcS0M1WLGSC6m649/54lt/xHU/yOxYce+xaAHBejWd6Zuu1

OEiSHJwpweS9MHpyriNwjc9bPzW4Ppkz8KyiAtYhKt
f/ZwELg7cnJG7rg0LXc9qvr5PMnAB7iJk/X6FmveQJ0nClnxB7nHfWMPtpSr+2rVIaIqQNyHO7bt2uWo

kQeXO6z/xj3YQrHITZFILzsSxct1OKFT2ZJ732GZ5+y7ZB9o94AN3qmw6TAIpYDrD40f61vi/LOHPuL8

TpA0o4y3vLtiyfZfsi7SS67lxhN3aYb1OK+Ft8uril
3LGApk6q86iz/yTMBXrW8Nz+x5SukC6K+rUaD/fXxRvevY0vNbvaKPVduHsuIC+0KJRM+yfw5mF0Y6HP

PPmiPKN/jQ/MGEgqm6G2ZYVN8rLPcG76eOrxuf2aB16iQl8Ru59Qe4dlmBzeoqKhW7lgTMwL8mZb8pjI

LhN+9DgsaDMgycrpvG3x1r6fijHy6YTu7cLDNgpHv2
IjvTcrabffkNQzu/Nvq5w2FO0moOmnkJf5TeBBRV44F63zECye43FphineIM54aDDbY7dk1ZAEHsKGhZ

s0pBZX/39IYoUrMiNB8I5iJPSqQ+dgEfKEmoUi3LRfaqvFBZ5LWh2F3MW7D9D2oqShHgVNaDn1gCQLRn

I7DJjHM6maesWzOnH9trYtHBfu9eJhErEYm9ZCJrBo
WbU5Liikice+H4E07K0QhzWwSvcegg3R2jyLFmDS113NEkOel9uzL0rLnikc+IlOMkRiQznWoOgIKGTT

CLdMY8jGXGXT4ZfEQnrMjun1xM5UzSAKzRfXZOMKA9SMtish+mxA2RT9Yq3V/eaX/8rTisM7MmMcfh7b

aB8JSWHHjV6at5UXw1nViERkkIYcwLZWx+aDuv/msv
qPZEXx9MPoMzkF5uUBLIdzM0viZbuXryj0LaMckTnUT3D91p1vzCb/3SjmO02T/UgDV9IcFCN1EnajRM

w5G1lSFmNUBehHp0m5xGkFOTkVYEr0AD1FygQ65b4OLqmcjTPpOCKujCu+ZyaEUxSBMwGiyVHI7xX6ci

bV8MAHJUF26/p5OthccO6Q/crd9/nrsf4v+QA0SZSb
UuFfnNdS/rOmogZATATzpOsxiUcFzNQDVfNYhbcZWq9Ssdjhueaic61DeYvRGNG+oMnDNn4KZlshuC6j

Bq6TTLDDX1cf3iIG/r+Dq1UEaeJL9gYd7VUQz5zMcc7I8XzVyLKXpKTBQMVBSgaQgJYm2XCf8xgHOST2

XFCugOFDgjF0YkZs9wIs7UqybfOgr2279jxh6pAq0P
4D3LRbPf7reV6BwH/Ep0ReOQ/wLq2ymg+8HB5VpUemIQhchFbvMqgRP6+l50l70DFdPyRPdbEuczHCrZ

h8AX8kWOh4HwbRLQDTRQGYDpRLnI+F1M0qV0c/Uk8OHfeUVyssFkvdDNYBSpC31kDMcKCFZ2ghaQFkQr

Jt25RwVLq6+y2YNP9Ov1UWi3oc2wPv8QhOaoe7ZrYV
DPcz8ak5KdPs2qYxpqHXYz4sDMImWNEacLQjtDZGLJAkD5U8EYC0E3/ZWVVGFGgwJS2RSDraqQNIbMYh

8R+wK9s1Dtw6u0h7TMKIWeK011x8hGC562rqc+7TBySh0can2ISXjq7ocXrRl7tCL+x5s7b2GpPeCI+q

l021E9v0LYwLcBfpuiY9SDeZ6OOF9zw+kMzzZ7Ip/d
2iPgdtwj4e154Ppj5UVp/cNZJBR3TU4CsqVUXueoZjeNS0tLFdWWbzZ75lTpk5fYjLlppQpb5r+H5L1w

f31qZl7cpkx0+iRG3Fcp5MU4GtHD52A/MfG7R7vEarbRu5p5tAa7FKQ61LPa7rUjbWXEugq2uD3Ku2Bu

GUM+/UQz4bI86Nl+kzQvP8ajpmz7d7BAEYw/WChXSJ
zHvHYN58LeWtGeBsGkmI4wWyBqAr7Pfqapmi60d2Rs3Beg2KuN3cKllNwArNosaoSH6ckY/ALEU3Jkol

WnBTc/zMl+PDDZlyXgdQBJd5SagDU4J23uXFBYd3rKnl7CtGjf98GHx67eUICmCnQd0YoiKkG6VUpV77

5/AQTOeE1dhD/txiWleYgQIq2wJQv0BpJV5R4+8759
qBbLH+VOyRryEi9FZTJrPBTj5C9FJQuM9H0MMbqnmgR7nTLOGFzioxRc1JFSb4unOL787mOijApvxU0V

Cg1I6VYddZ9U5IisvBssb3io5ddH356GvvAEXo4M9mM8lTpFqA/lqd3PLpXsZRFINn8ZNyXoT+mb7PwZ

yrYCC2I1Cu/zkG/zEGWBKZfp60SDlwRWGKAGyheLOR
oXvPwGdk9ydnVLevV7Ik1wlO6Hst47rmXYMXTr3qEk+heuDjsJ4XAsLbF9nyJ28+AGx+SrPUWUgwz3DI

vlZo0B4k9hmaoh5G0XrWzb+8p7F9NNLNGU7gEONhAbRTKmGzg0zuTrk+CxojUz65DjbmgnEThM2nQ7PV

pg7zU01D3OGIL+1LLhbbmHa/5DeXSDWVjfiMJm/1q0
SEeWaYfz4YH9EEEl911zW312+VNmajfG6umRmyWvzcpE35mowBVXDQk7D/DFCXyGIJAWW6aZRdXJX5K9

mjbjtI9bGVaZ1QvxKyVIq74BLHsOBUEu1L6eXvPNW3t0azYgr/zhltBG/4G6n1AnVZpJXmxIw6APtS2+

VB/7ztYwiW8LegtJsZpN+wOfc+wgGk+KIbC0E4I2f5
PpJqVOUDBLV+hNTFCpaPENONnMpgZi23JF18kydEdJ5eANHs7trfInROWqBKrqTCF/pbRey2CST00Ple

e0jiLc6ULJNt2iKLehrg31FJzmJ60vuld2wbeecvautTw8lA+K4nI5OEoVaDAz1iJH47SdhhwICkacJv

tV8HJ/brP/iX0x5RogxdDTwhR0tukeFxRtytsyHbi3
MQDnUWKcTBlYd4eOr7YhH2sLKE/QUS/VkWbCOOKzj66IgrvS+EDq7cMfVgSS+VzIvZHksBA4aC9pCzjj

F21/RetETcHQDb1DqeDbVsFItUG4Gbfq7DbIr/7tyn8sN3JB1KKDRM6pJDDciIiDsmkOU7iz/rfiKeL+

6kp0EOAacdDTc1bxztek2FM2nF2FXYi/IUs91D1sO0
0JH+T0VypPp3ndtP3m9ls5hfoGxcnWEcgZwjNlCKyXg/beUZ8iIsb0dHrma+U0AgcMv8ns9KXBK1q32x

+YALeG1IFWXjCh+CLEFJzk5YglUFL/AKbO6IVWlWJTWlTXcVvhaVC8DfHe/Kvnc6U3nh+J2BE2Z4bTBV

51gZnxklHQhb0/lD3AE4wgPXW+2mAk1I9/i/OlpwqL
+Tc/ZKt9GOeJqGB4Hr9gaf9FzrmvUpQbb5FsYI/UNjaTcJp8tnEMYJhEJ2m0svtg1nxNIxyHkll/jbWz

zwZkPGlP5mgkBTlwnnvlhTKuTfBXLmexycqhZBbYHulkGWLaYNElafaHpMkYXTbdg684iK3ZRF+lfs7K

NyZvDMWsQZ8I73/WHAbZ8MI1FWvP20oaw8V52oGNxn
bZeCDrLSMbo0M2Sjbu+FYrE9cuPY3AzBlmOP5sgmVvwWj9x3jZDMLdbU24v9xAh39ZYmmFHxM4b8lCbt

Yco++GDMx2Ew8MKdrnBMMyl7zcVKKa2od//DJmv/m6G0lb1/WuvnUQrcRSiQ7TAE/JHPhntJYQzgVQG5

6Ea+xN0oMkUGTSNJ0qhQagUf7WpGjPqZC9C6/HWME/
W/k48qG1VytlyjmDR/AJw/Uf8ppbAzNw1sxiEJ2mth/xf/sZ5keR7vi/aCWZQvBoMp4Tny6+wFoabqrR

hlw/YWlVi3ZoWIapfyH05w9OV4MJ7s658f/hJL888LGFLizxniF+X2cP5OAOGV3USjK+n/4Es5JSQ13G

e5bp4E1Gspz7+wZQ7HZv71iScqpoYQBpPcdxT3TWQa
abp0rgGqzi1dxhLc5biRuyOCi+XovurLRCP8+amYmXCrrTpXG828UUXKcqEa6e4BZQ7dO+ZFoQiYkBEq

fPG1TRy0GOw6mW/beT37t1QkeSbuSpaJ7tAbtALp31QoA8cCoomVQzx7rjjlXE86tIu94QjV+sG2owNf

IvRO/dg56tlbJCKoCSbBQ963ox93z6zpdg0nm1WMnj
CvLyS/XGFr6P2VRegTF1DHPBnvTV/2NK6t9TFPLXvFX+D3SVyE327DiB+2E7VqncIvbw5mR38akdC4UJ

DK7jNbOfSTaa2JRJXC8xFg7NHYmZW03A75br+uIIhXbN44LQaIZSZ4xPwO7vArR7SDvxPlcO7O4io1kk

uPuykoxNKIiCTVDubUc3sSf+I6xt5OvXmHsjU+EkTB
68UWCNluI2BpMYZZT1FMUBip3U+9V+3eROugr4Tljs+17VU3tNS+OaPAzIIVfTlnRIRkO8UVvJ3dRSgq

UrUHmKeujnBQ4CTGirDw+ZpWmauyzeBsKhVWqkLaGrz4vtUj8cXzR1YOF0Ti3rjNSpZAIbM59D66dc6g

jCPVtd3bqfWb5hL+qnDZeHAvyLEH7w02iPFK/xdeik
4Km3cAXq1g49O8zbx+lkTGK1lhVDvTXWr7AkJDlfscDi/MDg4qdM4xcRfGU4wrvaapQrAOSodIlpSsNO

RjeCHk5tfsDdDE1y2QKNFEQf/cl7nSfzeUsdIqTU+k9zxgI3DOVifVU3qiVE9KA7rP+1uGZETaWwp026

Bic6FXdP8G5+LuejGqLFJ+UQlyRa7pDJbgQGxULRwT
64rstYwNhoyucJ54LrcZfSe2ZXZRFEGxoPbj0JAex4aTakXSujN1D/BaRFMxkj20vw569+CFnKlrGO3V

JTv2tMmNf1NBhkP3GUojkXgMfa1M0I3HMjpCNbItl9T6ikDFQstg1I8Xs5/lSq2a6ZKPsOg3+ZjmTfjP

RX2Lc96N7xNukLKXMt3WssVTc77ZjeA90HIpJOouDN
bskL0NV/rXj9/tLb7hmGWgI/71fUDZ4hQvkiUcFUKmmeMuAuogYN86kpPfYfM/gNDULV/OFkLZ8Ug17f

fjyt0057T8w5rIO2/w9L/RLTV3Oy0sKSY0jemc3wtHEx4W5N7+bsz7jxW9o8T7d4cvTnap/fAEf8yRuA

doj/D8tZECu1reFGBEhq4YFyMp8DGPTYqP68lMzLFb
tJk/2kHMQRgtI61YT8ZloMXXx1tTgialb8Ww6ZdsTkLRfXMtjtaZ3KMVTiCmGTcNbeqEN0OthmHzTuTJ

ocmKpFV5KP8tD236xPGvxmb/7MtYWjUfbWN/UcTs0EI3QB6i1/GpPp7NjzMgKqu7Yp4fc3M/UIf0NNTL

hPQd9Wh8TGAatWbgnIkv56a7TE+vTxq3qQbBn5CzG2
T7twSIhEqA22vEG6ZCprplQn0WhmonyvDwAOszrTpQF9sPrreO1iGuYEkr4nLd+kKOoxrHWLTsUzdBLp

Co2VVomKeDsH3q+izj5wblMzQlGdmEWArk0xlidN1cK+FF8ZbWolzOcR85y0rWJ/xk3C+83i7kN3dDjz

uXSkIhdJF+adkCBypEJshTeAJ4FIlLymrmuck7MJTY
7hCo3LOkmcE+L+msd0w5nE8NiTZaYwvT5GKwkIWEklKsSGaNyL7rtWfDsRakH+XGYAPmtl/Jessica/yrepp

X8dnmAZiavxjUE7OHAuavSOVPlJobyc4REgRT+l7m5Y5ay0tbNQBOWmd31g9UFXPprvqWUT3nG6XoqNT

nW4EWQ/HERiQ1if9vOQI1uZ7SIjUnRsj9kup0BQ0E5
o08iaNuZ5XAKeuUtXRFu8Z3VfjCL1UnEOiIi4+QAeAk1ZfkbymXduksc58PFjig1MpzR8Kfhsjye0b6C

WF3KhajpRIN5JvP8dZUUlj89fh9uon/GLPKvvJ6std95lIG7Ja5sIugsD8y4VswhYPUfynV74eRF8Gvu

81f/50qcPEcIp0iCVV7tljbEyifTVeV5bYVGpcHSH3
0HGpIF42Uu/AA+AZq/xEBhtaEFvOyRM5gneMe050UfGAfYic9R7wYvo+C+8ieEOVjZLX4QHrdnjl9mDg

blT1t73hpeVOtMeJydcrUw+6wNuzk/h8amSb8JlzJhrKxoDft42diS4jrbTZTzK42WVJWqdrCddklefv

odR2e6nNxsaKAVacXBKyI5Fe4W8d19oXdkPQ5V6IH1
DoZRe/tXDOx6Er59TKIBm3An3ii8SR54WbZy+WBeft4+CqJPbHJejcl68rJLeaUzoXt9Ra2ypaBI/RQJ

UppVvgzYFYnoFLPsYedE1kgANftiCaiqP60iqCBvx2NeF+SqhcqLs/w8oJH55uF14ql3WFQjhyuwVKqo

dCrosNyhYUSFCVHVmQXCcsPRewv6HBQ9Kgdepy/5cT
FOMU7hS4FE6dGe8/wSujduDBuIeEDcmRoxrHl1nLI+qi3XsGRSWAJd1Sa8Uf4xwp91VY7ISqgE4mvEjJ

cMjp/rOvW9ED27/xvWiooY77YmZzFaXzko43RNX4RBrDsp0hmMW9xKFKwKCl5mwiIzNciSoN2I2wdxwd

zl06Ouc4VKX1uVJW3Hjh5evZs2G5ac3ZR2C+H57mUA
w8HCpeGvj1f3Pmq9tk4jriF3/q2IHwIGWLRt8V8YcdflpOIzQblclhAvUT3iUVJxR4NTouMW0k0wL2MS

YTnaba/1fIKhdFg5gG/KlxR17RKnKUZVnh+0+hIvlfzld9pfs5d1xhtXGJ+yU3gcq7gH85QCBTkjvvLk

KasEQqTXvbzwagnrbPEJ7QlmBaLFaUN9iraC1+kPN0
RiSy9tvXdIKDYh775qbfIFRVGSY6Balib2fLqRMdJl8tSxGvY++27K+oucQZi6hr34UrrGVymTVjYxbA

aSRUTAAsKKFg4jmqjH8ruv+Z0WAmYzE92XIcqMg7d6LiSdSWRNSw+t9qXMIFMY9qKVQuJj6AQFeX6+08

5N2Yzot0zoayySBQ3MtWlPvHUDqWn9NWoj5DAoS8Wm
01/Vq8WBVzmuQm4mhKS78JR+OtxzbBEK0schufUyVJle5Bct4b0o29P2nt7V1awHTFnuQPjm81oV3xrf

QjrGEwiGxnZfRI7ylQ/ju+8BLeNjVZXGGurx3Gf3jQBp4JmABOpRbF9e8ANafa+s+htwHBLyrXCXR0X/

j1NbJMeLdOMBhBaWVWCbrFkfvvPVPgW61IQ1hZHZEA
lmVPwXrtF0vojZQ6q4kKi0RSM5Gchv8LnKVVLNKdzgCE10aW5okXwdl7P9pkndAukeIYY49Xi3lTvVBG

tPkRzuXWwd23u/2bnAKT0lNLFYNbr/8YgyHPH6hvTpIFewMfr++ul5ukc6t5054GozM6t9NO7zufvMdQ

kreNqHeiOkJBaU5L7K3VA8APtFhvQc/sSFW9G2Ezj3
k2Fo+4Agz1rRI814fIulyLsoY7Zo0tP0LDq/au0Zs+8AZRHkNkuxiu05yrkYOASJrq4MGRyMVzrYEwkB

BJknWkxVRlOnh3f2s8/CJZeycK41i1nP5Su4Wu+I5Fp5aVi+4vBalSsMMID0azQ9skCvL2r+wCFBz4Dj

iBlwO2I8b0eBxv95krRQ0v3NphaGOv9u2gqmK6FSTL
Ud+rRDPP1Jho93rm5llnpqRlwGs+Kzt+gDCXQ22qOvtKArHX/np+sDKpDEc0g2zfQyAHyR3mO1ANIuS+

5lGFUo2MldvhEiMw1+tRPL/5udMAdv0wzLYfh46SA3EJvpudqcR0X4S7xznqlUU+XlNARzB3PnIAiJ+t

4PKfw8+T/1eFU39eD6JiRvgwDBiFUcHgewbI5wbfV7
CXYzrb39In5qMvcgqPLmlJokPh+TIweD8tJcvtfWzXgdRPIr2/X7rYx7xWVAOHszXxnnU2I63FRbM2PP

b6iTKL9npDDp8cvvuI8d/OS/7/iSQxvQXe1z50fYhHDnX6F1/25pkctxhDoEVAiCtrPaxbiv2IXJSux+

nj5mdmK0hy8FIcL7RIDxahKrzESd4qjMwJcGnDZ3AC
L2kK4x2kWvJ1ZZ9o22guMfhfIA1pU7A0B+Kg0ADDlV9Puk5nsxrBbei9zoHimvuIkZkm7TCXWm1j9Ax+

cCb23Vm4QsHXLoI2y3ze5S1KvEtmpav1YBO06brj/CoVaH5eZuQCqcqUlwQnZkEW89clCh+4rXHOqg1c

js8xZG/V7sbKzW/GCG+8kqONSEJb8EbP+LXhNTDJvg
NoDD5olBx2V90l825qplt3k5cZq/TT4oJiA6/Eu2yqJZMzHa3A7aLDXOAlAebBBcfhBiIt1/0FxyHpNw

s7FDuMjBlflHNwXtLnh38Av7jZixwV7wyzboi18LABc/J8tR7KgjfU6pyiq0/Es2HdWmr4x5SeBUJOjM

frertfA64W+XJzmohduMNqrT7R6ItWNm4ooY3i7G6Q
R/NMeK+mXMhsrKK831pa3z2E4z6KVGgjAsvhdcOE4t4dbnpW8qse2+00jQdZXH7Z/l8Gl+LQNiNf3Blj

SuSV7o+iIhhO5YoZiS1xyFSf222DLzGR9bG8QVSYSTzrV5EdGbNL/AWckqxlMQpnl5CmY2gNt5mZCUJo

rSQKnH5zka2I1vp/QWg3M3pfq0JB/N75WcNM/Ak/ch
4y5ixPSB7LN7t4pOlvdvsZ6O+E+3iXHWwUC/Z9ehBXxoXFNfjVCqs9yjiOqftsvjH05fn70wsemK+vg5

4+9ZC64gtdBXwXJMdfaz45RwFp1U7mZ0OeFChproy6Ohjou+innOHbTbTKxn9rawadXrpihMPN4bX8xq

IK6ONg4mkzecFsNHN5gihptaaWpRUEU57tkeWyI7Lj
i+C68wG086cbQ4byO3/EvxSAvHJlfcss/tHIOaxuszElAOnsNF9I7omVzv9OEn+bis7mvEdx1ioLM//a

M2qpfu9QHUSZS2qcrdkLmPTgRTy+o8F2R+xT2CHhUB2T8MbyieDnOl1YeIow09Ga3d/fcApIlXJ960k+

VqokcuLcwFhe3+USg0Wvf6DM4r6lh4wVC9z3uO9CSh
1xahuBKJ1lXLvFgKxrRSScYiYGDVd2NMgXfjw13Q7QAc5bslZRXeattuhWodau9UbIkOdPrpPixik+q1

s+AjjJ+aTWd6a49t+hwI0lz8eCHwq3owekLH/o4G6R+CWjfeCphhERR88GgLQOo5H6fmea8UAwBfC+wA

poc7sgVFvCUKeF6v2qHC6YfS2H1jL+L/PjQhPHdQpS
vRylJXf9G1na2cZtWG0I235bqsN/wr5FDlPcLF1dnjs02RG3pb+kviT5/PirzUKlhSG4wbC3m5bLQGBM

qihZnV9Fm/R6s2jgGaE76pVDK3Aax4CjDQmXDD83vmcqliL6jh+Zvh4mAKYEn03piLw/2dhrSCTfX+qz

gho1aSXvrBzHLya8qrvAkcwhOqM+rHVxbi+BozseIB
c9MJgWcVKF/fF2EdGljhY/1KvM0CLDzLNxCmowVSwzt6cilSXjjKA17IV3D8MyFejBY0rDtIY2FYidE2

G80gSTpmh3vZOLzsnhm1h5PFZ5i23dj9g538Or2y+UKHTG6oBhFt02lqlLbJqN0xypp9LgDj0AbJ8KcB

smtxl2HsLULkgEMt4k9Qbghju200zN87A8dDqMeKuP
BnXox3Ejn8EX12D1Fyjt3ZdWsKim7+/Oy7NLn8m/gkbhnxqn3romzDI0CF4ezjHM/P6TnD7RTsEy8Rk4

UkuY7N/Ogaq6iPIdyBo0E+6lR2ojtnNCKOg55HqyBQsvBwW+K6Xcr0BEISPV0UBdUj+DG1HIyhLapH8N

V0ygKoUbvs7vX1hI2ygqEQ5rCDsPJGxAELfFZ4zWO+
zJbyFsBcVe7B8ikbax3vcamSzfuK9f/U6Yy4l+5wr99fGTskQ9Iq1gFPpUMEaHXCXyQ8EMHmLudALqfU

RN18EC5/iLemBse+s8gsXXWOfFVPa2wRhzn/cBMMLzzwkHQQ+TqasFQ6Y4EAeNB8/lB0B+zCCao2+C0K

ivm0RT2zwt63hKQoMUAbsBpJAhH6bHLTjBMC6IpigG
XrJvEopQZXOpoP6jDWvwb0ClmuwvcMxXO3QC296ceSsUo5pHALq1Q8K25C+hJb/DugO2rh7y+Yy9bSW6

xBodApv2fifDCRs3qw/S6YzUa6A8/dMHsjsYko9h9zKZ2bFOWASYamzMasMXWg+oLxNUyXlBim3F3a/J

jyuy2vXV7Vi8YKGmZIJWcKyZxL8MRbwuF6wUfdePGu
7Rh7vwMKNU3xAlt6Wk+KKbFzNcpqD4ddb5JvyAjlytE3cdUzFOUpgUFuJM1v0r4haQFzY5MFYE8Gx56e

j2todOzEF1+LIRLU2zK1GDqKeIW7EGhd06WHC9yHscRYXmuho7fRpOYMyY+WmukJ9RVxk1DT8izSS/li

nIKN1uB/jxu/Mds1l0M+K0RxlZ4wvM7ELtejnnQRmi
s+aJbScFOLoBY+1bmXpzU/p0pj3Tdfol9UQ3+ug6VfGIsr6/LzlK28RSrTJyl6mXkKVMCxjRPecFyaBY

2ryIlwYugUtf3ysrMvhM3N/Zt1FZKKhmNeBI0c5oS695R1lTVqS2VQIiBrDyZ6qaEhZzqxgDkTDsDtwo

+MAOuT0nNWRKF2D7/g1JKTY7/tI7HTxxlOHJ4ewtno
1f3M1HKOpk791Uo3uWgiGxGBphYCw4GdfZNAB11X6IlLkJlfh009bLqlsXgZdexgFnpjasGL6F1axkuP

jjULyte1KtI6UULD9QPj6b6c5251cVstH5p4gP8HgFiTj8xQewyqXxB9KBBMkzIrd3RZZ9Rz06TtrdzY

GFwiBFkf6DRuwU45J8DB3cvamQ9/M+1X7tIiTZE5nc
+VNw/b5JiT690EebUjBCt4mLg+xU2wVbpwuKh4nkgrWM6LexP9wO0/zCSa8ng6fWT/2VLhFzoctZy/Cv

b/taQ5z/znQWqqszpHnP/aX0EP2kc+lq0LCOkjbzbJep/W9XVDKlEi1NNAy8ONKw+4BWYF3iEaSsh9MS

o7GSfULLuT3cBPafReYN/+Nr06P/2iCPye2eULLnPD
OVPmxiLWIJOLfzEaeIEg4H4IvHNBGphKyAu8kXVrldYX7KusDPJal+xkUdF2qQjbay5Gk1ztENwEuLtD

LijqbtfU3hgJF72i0wbe47rUpkVPqk1W93ZW2L1lL/7TNvZ3VKVyxOMPWzg9QHKjhK+SbOy+iBCm9FpL

wNBtKBCN8AwVSpPa01Pr0fKxPEU0BK9WG4IY3mE9vf
wetQP8LNpsUan3oPKrZL9CLZIAYreOOOOe9D9nSVT8auuNz3zChM5V2d38RMueTWwbfUurnlSR8/OUQW

rVM2fncnnO0q8xz38BXX+hvZDvxL9llrnSYjXwsSCZhPLcwg0tub525RnR/lPzxK24IV0CjPksm5muUg

LIfb6f+b4TVjjXFIaM0f0El2I6e/i2loEgbcKA9xAI
mQ6rKaEdKaLwxc7wVhl6IphIJAKYaSwQSexXRYs0ZTkAJKV1HLkpzjNtoF9sm5NbzbJADd7TTUCUhpfY

1/QyuvnscL8v95p63Uu7t0295bJjdH45peazjDnhs9G/uGF2qkH2NpYuPtxgNKvMW3jiSpz4QItJCnCx

60q2/t2C1zFYzYCemYAKaWOz5/A1oKVwBURk0xzK6l
IBX9w78t75ZYoWJn7o8z99rQbxZecbdE4k83zjm2h/fR3EbhFCwDzOohT2OhIRp+X8bqMTkQ/Ot5AzvI

4FWxwXajlsiHlzYmFitKigfSPM6eC6yxA/kexxlqxVx89lhPx8mlAnLBdVbaY+NO/CJ+PgsbuDRsZVmL

YsbMGc6xn4Pbw00Vi+2/nfXYKjj125FhpLYWAESQ96
is2kf534UYJlNQ5lS0Me9Vw5txZNW14G4su9T82C2HuboFrh+v/7KZa7rEcA2UZSillaReE5jWqNwBpj

lxgJ1oszl6nu4x0+qxjgbRmYu0VUxfHuZxPGpaqNRMyZIv0FaSpntosv+SjAj/DQFkroVtv8zedtCxcS

v9JkrrkS6dn7pEwgq4AahnZGn546dSW7iSSvrCCqze
e27fJekSJaaAo7wdHjxUODxHCeqz4XbmCm9HTkkf8SpZmrl+/be/bS9YBOn1kQS/jfN9tZUr8IYxbvwk

Ve+6qVzDc3ftKmjKPXmutb7IeNM5AXCWdRwTAJ0a4GsgVHkXue65eC0yxmSoc53a0SP345jbTvrcjxNa

GM/TAlyJ28b5CroHFJ/dLQxHvcWCXR1tN0xCIXAkB4
oDqbPCBVQG+nF69zsQQsL92GaSRLTPGIbtlk3uYUPQLaDe8kYcU0dDJ2sxftAU+bx2M4CDWebZDDoDPa

ZGZWpFDS2bmynGDGwkJqjGDR2ri2EnAuWiK4VjO2oo80/3bx88yN0oFq7SMjj9BOZWyXLDvJNicr2kkL

Ya/BT+CCS5BQrgKehtW3pscTzKkoFBsO8Ec2e7Oo0J
zSNRL8zpCmYYNF+iimeaVyzb5tB8AsCh9rK+PpPBWam/my5P32+wwjiJDiq3GH+eA0/rGDzC46uZ9LGD

7c6wW6x2ItWP3QmbsF1kOXTIgx9sTObQ57Ob+3Pp8khHjD76qZ7Cni9sGB8GZw8/C0pVULJEPjSQiqXu

rzNvNzpGSnm7098AijmXR/4+EAmdTi+qnlvq9slSQU
fXfhRsRhwGgclV4bPiTHo//D496H7zGt7ftDOpVD8og0NmIwGp7LMbXJDmgixPEaKeaVj3VSBSoiE7Du

J5XMyJRT5022CyDgdFiICLFsfIcrmRjsgJmQBUGgItQuTOq3GiayYGujy31khalur+0+8mQdNhCJKjtd

VPuOOgtLh66R4CwueXGgIKijq/TU0bYfiu0ybbgz+4
q8b+Mql+0sSYtKncskZGeDs75q/IWeslH+uRA/4JnqQFr01HFFokODb3JNeQWhWIIb7+jpibGRco4tci

CbJZ0RiINCc6FLkfE+Q32Zts+t18HxalYC+xOgR3YaW5xsHTsiIX7Z6Nf3TpG4JeL2iynT1Qa7kk+M8x

qEycMJCfsOYGeiD4N0IhGKuhcFCbnH/Az+6meDLT8r
2GmvHORk03/KlEpdrF5rt9iJ5Dbi2EFDuXpcgzyaTOd+ZDyFsz9Ttb+g1M430Kpfd1Surij+cCjJsloy

HiqQC8t70lROH9OJFtJ70oOkVqhOI9l69uvMD/jD2DYzfFESRxqKFgyxSeIOPih9wIrt1OKi47o5O/fD

x0r4J8qniczLmhvtV3d4C9y8h5xVMWQJTrvqEZPd++
lNE2U8HMPXzn4CfPO5QEOt5w8xv9n52ZEzlljJ1XRUdS7Fmn5PInDl9g7Ci5sHps75wE+wKsIilq3i+W

OeJazkRXt6fpc06i1AB8hVjBgCAArVMRdGH7MxhRGaYAiV1OsfuGVAheTiPSF5OglQMxxarA6jLG9QJ0

ZW3fBCn+hQgo3kG/E29KSo+t/VbQcKZ2eGR5FN6Vt2
wptdO27Uw4D8qnYN8qgMzvp9gq7ofn+mj12g0YreKRjkTx89whgGL4ZNc1gI/tHEd79q9D/1ONjSFWF5

LxEQDX+ijk7oH8hBH4wA80K5D0E6mZa5p0MSKOoS3zu4u7FxyseZ8v/A4fcZ3T9EvRe7Mg/aWalsrt/u

Q6mB85Tp/CZOe1xUQv7Kpa3zKATXHGRO1o11JP6Pq9
WC/UX3e1w7Abz70KibAmCIlQ/sGNNfQHuoaK65c9bdl8OLX4cu9oXzf6jsFbw329Tr9Qoorctfws0ZEh

euCBuzgYZC74+TKkcEufPOjc4rXttDQREjU2YqwkN/goN8JvvSKEFOYyj2NEOC7Kx7Y8e8Ifqe1gESyP

x9IH+HIgOXf/MCjtJrYbPSe9BOk0RRmTGaxVq0bUoy
s/K4dsRCgIvfEHxeegDsClO5Ms5kZjiq6qf6NgepVdfVM8tQx7hzi4skCNVIDM0ZHDg7LAx49Rw7BwWe

bNm9tbWGrcgxL0TID7ameC1NP8OhhnBksPtKoAmWa13iVUBF9+KJJ7d36br9udLPocJNuw/un0JwlxEh

Msz0l4U6KLOp7aaW/xrjHEYsHIYKdU3wn3uaypfOnO
aK0m+PnixizmDxOXzk4DfBzcoAclSMz+hBa+/aX+WrglwLWPy6e3mKLyyPS4Z+Q53Bpb7QuHzTIOorhx

vkpf12l2zS5kRkxCVfu5EMYcV0Ysf92NDfbXl7Sln8pw+jru3MOvJaOXZtU/UoxZfbt8SsBenZMkRYpX

Lp3hGrI6lfADAvVE6ZJnKH62yNZOqDqEzxBkcpAQym
dHOTz/u/AwvapTz+7PTAXW+stPe4aoandxGvnoRcs1/7xaCos/vG0P5xFgPrTSmImjrzp8wFFxBxhwQM

M3VV1QRATNbveovRnxEsQJ7L8HLELWyV7dU8B8XPeCo0B1OcnW4AR5Ygec4oJz1PWUsS3s24VZPi6SC8

Jjh5tFaIljoQAS/I+zNhsF0FdJuwY8THv4EIgs+mRY
zhBmJy5b1AhLSOnNoElK68CjaFhS3vWgBetL2C9GQCY4NC4qcukkjEZLrZqwi4IcvqIE+ZpvuZ/HDtrj

phGz22pRVcjK+58Ha7iYktC3DlPeoPNm91nvmHUIPSFcyQdOYqKHPnsDLTqgK1eLyCpVG/rIDSl1eVei

n+VzDJfD8cX5tBxIRvJTUWGndBCoOJkl5ERFGre1B4
gh5JzGgH83OxBffyK/ob6vZ15GmC29HT4MYyCsDyLb/kM9NEdecjN/lqwtV8JAQg294P28RHEHGAygkw

eLCOfv3WJpdGWNcejfCD/0v00T1ziAPKfWwvlhoMdZli3/QwH/SzyTnF6pfpkIfUwKhRBDDVl9Drc5pm

JjZbivu3UVz0rKzfDPgELT6An7fKllDanV9ueFLcHY
BXbkA+AtxS+a9A6FsGuWpeuQniy4pQi0XY/SERxuM6abPzuoWUw8TD5ftoNhWNhEWgzm+f0veEassZB5

idmeqpsk3a+INMew6A63k3G9WQp9OvCaPe+Zj3PY9fLvVAraydHUjQPqJD1kzQm60rQ7SOGYXSxWS3xC

u1eL4AX+1777y0YHRs1on1By91VxEn8QwdVOL4Q315
MKQ50qbPTv9+DTlxIcmHr2zxWwzdSWJIVTCAlSGmvl5mAD5p2lYpki/ZyOkOjIGojJ1e85O4fOt0dsIq

uHs9iAG78eNLDs+N3ZzxSXj4BotXfzX4vPIBh6AFtdbkvP8GeL/G/r3uuTdyWh3dN3M9tUpbDtOilUyp

9+jYcVN3PmMFK1QTGwIWiFfYufJ5ImYZiFG2rhjw/j
vEqNh3FlvPFgfQxQ1yGkzC1ID5rx70SEWqpV1whHa+hsRPcxtxpNIW3yCC+DPuPUD2OB5ia5MigpHC/r

ZsPJoM7/vttoYlQgGmrDK15ClY7UPRYok8uDbDxQdEfKb5szgrwqz7Ut9zg8OFltzgydiEV9EANgdCR6

w+5DMwhEd39M03QOdwrCBH4nqXedPkwHnuAucnjtFm
YJ3MPt/6c36a7Z0sdOMtiBJXRzFFWMKVsFfcKZhhxhlTRUtzY8TAkHguSoC5sc8Wmmya7pWE8Lk1kitZ

uYMFQw12wl5jMarjerq012dqlp/v5Y/3xjAO4jpnqp0JuyxJiBXFgq6rvfy0yHiFZQuFnswP3Q35hYz5

xGdNnHdAHZtucg66xyZFotuq5uuwrqgXATeOFX3HxY
6GskFfvkCTwOl8gHGAICoepzS5Enbqz77Csm12Jm148cWf00GeIe8Jeop6q59Y7aw1x45pmXFJwDf035

KEszGd9zdG4jkq9FPEmQtoViLX7FoBFgZE/3mbqZA/LUDL2Qf1A+RoliyPQa0Mezx4G7E/U1aiVzFtCR

FqCLiL8BwgUjBGC94SiYOfFg/Ge721PRQxSFpv/Fqq
x3F2aSDCP+UDFm/JxYv/bN8xv5NWo2P3NU7fJXbT6HxLtMEHrPF4yt2ErbajiFGfCn+UEf6GvEiNN/LP

C1/4r7e4QPcK4I/F8Nbj7sNem5ZehIKaL/FrmuWU+b+QEqvemqWs18Xfk4PeTqg74bmPvx1wvbnB2fNa

CxTbi9860wwnYYjQzfL3JTdhkJ6VTi76k6NFul3ZIM
hrdd0a6yT9iFs3GsmSjA0NIawgI+STB/+moBNP6O87hE9w62xwceS2oZlqEtVL2nLbZOuAjnZAiZCVbG

HUGBBhds6iNOFwCMVwEfV0qPPX1dDK47kVmKNHuEKRg2jY075grcqPI6UbOe1oFaikauf6eN456A60z/

x/4zuvRHkr4fkoyvHEeu13CU/uaDNVPNsbDs2DbO/f
FFH8hogGRsAbNbE/bKEhSz04s2gwDOWOMN0Ox60h0v2FygTH017Q1WYOcA2xjLcO39fI4cNtTXss1x3+

2B7p7lbfYwUzK/c/M+5/duojn3fWNjUrspBEGW2mr7+3Zy3A/Mg+wdExoCirXSF/yLJKeTJHefDY1sX8

Jye4T95yc3yVf5Vz930IDWyjbziL5VCQVXHfd1uet1
wxbNg7wmSYE/2tZhtcmGmpKCK1vjdYQcPCjOi29BEP2I6K9V3ZHD2fXcXMt7xUDEZzHw1ltpdCtsILL/

mBGvxQ9WNzt7LDEHWjepSFc+XkzlQEpgFHMepzpjFTsW/Je4wG/43M0uyZss3JuUiEny6V4QyzsMjFDT

KZ7946uCLSvIgVGqYw9f9mEdu+P9QwS37mUrY6etiF
mfzo7+DlnPTfInzhjO8kzWASW3Wts7UFNgw+Dg0t2qk+UhKxDFkXU0bGFWdWxB3XXHLzV9VAvAmtNRge

MGupr25LL/h8gKmZDJNcql6PIJI9FtEh85u6CGzrwGhlC593RnnxlqGAceLp5ZMgEdYXPmuHFv1dhYkS

vp1bnzM0L3mhbG3baFKNRlURLHTWvLns3UQzXx3n0F
R5VjXh92D2M3BU89iJK6sHXICQROLlll5EomnvpCMuZwXU3y2kIFUQyKDm9NFd468Obqe0cuFlGTVR1M

eAxeqDIXXzDDZhu5mw6htrvRZnDZYxx4BZOo+jRucj4nj2X88ndeeu1coofF7hcBz5sAItxK2Tdp3RKH

t65pc1Bm3IeoW6ajP5BGWsGzX4YkvDaZbEf5fuoIHW
7MQPQvOhqsWHPvTyP1t+I/2UA04pkDyXp9y1iwn70TTY5ktZrB2r4wT6U/Fr1rDm0nm41i9Q/noI9U7z

lYYhRDuwJvjTfS4Q46y9vtENRjU4GU09YyO+wvK3Sz7aHMlIl2kVke0c13sSjyeG5qe63v2hT3vrNL2w

syipwNgFglVA9fMSPtMSKpTFytWnjKqw74hJzAJ68j
2v9cAAsjpzAQR+5svKbbxxDmooqjtyOsszLtwidpi3aBR4BoKnYGZwt9xrjsymLXKkUMTla27a9zEkh1

1K6xB+Ly9ql3yGMFczZm0Z9FhngyldxCotpodawtO24pw6Twsr8RlVUTAvLfnqKZATrmetFssbKsJrUo

eEfx54TIwJxXKVVMBLwCAzPF7n/RBHNX/laHFJx2c6
h2mt6JFyvrP27XBf7tabK0U50IhHLi9aB17p1JkSV0B7wWMuInHQoaMpJb+EemXS3EBuRzJJDHVOJIL0

sU4fZUkgxMXVrGu61bopx/CtO22EphmYCQvpyTRTKjOeczfEvxdXhMW1rqe+i+WNILjEhXLdR7rWCCQ8

AFn8wTx1WB+4zlDft/Zb4sRfXgn+bqDKBFgpOtE+Kn
DY86WIN2DCwQEBYSq5Mrx9JnXeup5zN9dWjX0MdoEtF4b9Ko5HZCLM7DPxsdlCuAXfHZxybw1Heb9+/C

Rl+oU+mlLZm7/9es1Cg7O6XfYTA8CAu8R/tEPVvj9XR3UD+3cExNppPwwNXaBUzJ7S576aSBoVu3R6ht

8nZcB93XprXoJaHHJgJGCms8wRjVmUMqmisX3+rpny
2gyXzMG2ZBMq9/R1YiqIGB/AAbpqnm7GuF/NfZCgoG6zxofgC9b/VWMapB0QCJph0/XILbFbjdUi47CB

IwL4F7Jr1LqslASjD3XOVQ+dMU2E0hjR+/0CXzI15Myy+Bc7/Dn+59xxjqNxboakqf2OWnnMa/v+ouh9

yS4dDsjVxibBaM/tOCNhL+Ih9eMdasUnG6va++dKJD
fI5d2LCRsszl6SHG4fH8XH1z9lLpUf052YOTt1GJN1fYK3Cv62vN3p89GSycs3yaldn8eIsXk4F4KmAX

BWN+6LAIF9KK8FfqeeqhJ0axD1biGm8wVDWqpnbRhFiLVsQZ9+HhKLTPOs2i4fGV0qaHMni0XBKuF2Wh

7qtnAKuw0vGGUNHp9b6RhyrM0p3SmzM67IWeS2qIbR
VOTmAslPdNovmQkF1kUSEunRocjrIjOn5DVnHczPKXRNZtBzba8ZkAxOmcqX8ik6m8KY5VtIpL+D6+ZF

9eTGHyWpL7wSHU6GvxOmeYNnoq0YqbbZjXW6AAn6R3lJYI0ZFGoXG2L7ebdildbsKCHkV26PUDlq8RjN

RBVm1MO/KMzZmULOH7Nd7x5bJwX1L6ETjGTeeHnCGF
C/dUvDthtYlznk0RblEtV1DdvVexsTSmuNfGexPJoENNOeax1Emo+zGgvvgZWwprx88Vk0EQyhAudmA+

xtPPyzbflYB+hiqCGsx3bSI448m6ol5VAcyJs3Xej9jZ5tbZ4H40ynCp25WJHo+8/aMjHbHavT47xOqP

pvO/Q6a84/Dpe70g6Wz24Ky9X0dyvj9mBofgxc0Q70
LrI/hze0PytH7ADjmm5D717ipY7fvfHfomxE6M7/qvMlFQn/h3BEhpRvezfPxcQaVykssf1uLuGryY9X

n5sWu5HFVqf37KC5wJcEIoLgvChk6gmms21j3Dxmv8Ety9kVutn91fOYvZ6jcUIc1zz9eg/h7GZCushx

fEXFUwJ+4gKjn7Q/rZxAlqeyU1y5zMi4dJA9MR/boY
w+SypfYq/O0QTFMWUUrJZ1bJJxxW+HRZvaaYRdqCQxfdhTKv3hOyidLd4jjDwum52YXiYFsYPwbEzwPm

XLMITZaVWH/hfyUz3/MoJH6ejo3tdD8u90noQI64HUt+iXmeZfPhnOKTZ7if8L7atWdpxSzbmaXeyhHq

4e215yvDVwZlewnwD2yA21bN+xM2oPGLL7v2cgChCc
gjYRm7fyiXmlqnrTBuLrqenX4v+VWhkrOGTqy2vnZMKyAaPosvB4WMr92Aj9cwNVY6MTZ09OPomp+e5t

M1bW4NG7r+80owF2dNf0az7OWGosH/oVgxJMW7Tqq1na08FLXykR7RgJpLpWH0/xQqPz19FxUMtQ141t

/m5YswdCco0LmTwp9o48/ktG07PgNzUS/s9GZCS9B4
Kas8QDBcKOOB0FAs7xb8e5fitm5o+viuI2IkxEjbCxf5uJcLEgusPDG5O8/2L+9291Mqnvpq7gSzKrkx

gEutgbH309etT4YXQCdcY/MJCk5+yDRs6ifFKpPCfexWF4d805Cog4R7Aopr04Y1Gks9uQ+ob97x+mH7

ZIOrc3rK81AnKguo9O/n8kafzTqsMscREn7FNCMxl0
lxk8pWhfJZFV6jOF/dnYBo6HErYY7YS06Sc8jUhr66VdhQExJlsEqF2Jn/T9AddT2QlDvVQ+RnOBYpfk

xipr9hxnoyyH5knUbzyDEj3fx7eFcze54SXEJbneXoW+tgcNAqYzVk/pcppmVn8xFBvnJlIDFb4eoRTZ

wJYy10vFte5ywMs2ItG0N/D48cEJHMxMuQCOkox+Be
Y+oVQBWELdFX2lhUqA7XjaB9z2ouLmzwRnvSewrJvTCSf/CH0uI7PkTh+Nhm8w8+cg4h8s38S+SVmSSs

TVWpJmrmgD+i7zUDU8ZWPWJ2mb0Xqjgx9QjM7Bn5YhaDjK8NISOtWcmXiHeYEYI1cUrg2+S5JUhMaUGj

/I4btrjioB9RUTAF9zejZNDuw3fw/4DhOiWIHFM5FR
2ouQ7AU/jI1yJqUX/UXSua/UzKxzwvz1LBak8Ep6duuxlFjpGdpxnUsiYpiRYxqiasBoxsXDc1dvs1G9

Oi+CKEvY6CBXzs0oSItICZo5DxWAqJB0bvhOPvOIqqLn17nV846zzDqRB+a3lebb8zYd+HJp0TX6W6OO

kMTjhQJYh1eHKIKoC9IQO1U8YnUWZI2DgqOB9dH9/T
+KISDlZ0o2dBWvW9FBFAEH40I5ZT/Nw7zPO879aav5uust0dkZ1/PxcGGtEnDNiG+bwyKsMiRAs1lQ1q

DATJMR645pqQ1aaud8dBq92JwRVG/Uzp13Q2HGKgtoPJSELefjBT7/3An2Q1yI6vnxeF8Uw6v5zlwuhB

fR7bPxXbSnYYqjD590ROc2BL8LkX+N7N7IGXrgvyKn
SB37eP6BY5oc+oJt5PeZTCFY8xAcRRPDty1LRZLheUltv/odG0S/SQAqWzNhHOorkbpxl3w2ZwDx6M1k

nhMGvR04AuHXVon+BSanFZCv2aEGqG1IkCQjKY1gJ2Rcfpf7JsYNIkvzmK+nee/ovqp785tylr8/1lr1

ujsrbpCJ8kcUO1mP8gNuyyQftw2fIaXHYPg/4b+jaM
SevpeNIsoeyHWZE7taiFbzwuY7ccDq68S1kw+Xg7hnHG2fvvk+dwJaFi9X35XSrBSdQ/x9/m4uYvkPKc

/6cJRGHga0pLZs3OghNbyeWzXghIgeSblEIVSKyI+vIhguTrJMbW9JThbovbOg2XYlIQWWzM8JDlrmDh

7LRY4qNuC2ZVUZ6SYT/xZuEmqRfUjiT5x1d/y6FtY9
Dzkfn6lurbMiy2DqN/lwdYc3kpZBt3jrhk7SrhOD0b8SD/NejkDpgLLcft43F8ck8o+Ao+lPR4II2W5A

2EU9ne0odlsOdnm7LkUo16tss6RczGmDtRAOnu6RpK+odobENdtpN8og9NWElUDHuP4U9KDqXG2jwrnt

bQfyrzHt9z96Ki8ht/3eZXQV+jhmkgUJD48r0PyDKY
lm8uYqJdNFNbZ9qAgTXN8s6Q0vvhw7S4qmx9mqkeWcWqu8XMhbA+EudLA49p0g5e4kGqDJfxHPW02Fep

N90r/J9IHSCqqmG7+EvvdMdPy+ahrLhci9IjwGObPlxmvHroI6xmJL6iXrJRcVmyS6ML9242NNaIret1

H7Afk1ieMTU/nQ9Jl5Xrwt/x9fvJ0pBzCDmcZNDrrv
eUKzcDEfBZZ4u6TM7Xm7NkXu9kqKnxXQUPMNSFtOVj5jkD+i/P65G1HOYWFRQau9p80PiqZYE3+kP3b7

UKD0uh2JCBkyW+FdXqkxIEGPLdd3W6VxxaJzSHuG5jZmHYdJlj2OzajryHcuOiaAQJOAlE21oGGgo0Rd

qCDbWRauJrO7dOibKNj4oYdx3SkLO3rG/BSZclROOo
QjYw5+GY72wq5IJ5ak8bdoAZU8I5xCP4/KMxUifWOWCytcPrKqRRy4sTusnLEO24syESHcUOxPvjynnd

W6knygBNMjIKbhE1Kc+RljgN23TI5SP98sq3/MDZrXMqlIW6vMiXCJvvg0NA5khUgEETdROr5O5JGFqk

6NyPEPIbMuqXh14UDvovM6c7eQUwZE2eZRfgFSGt8h
51yRKNqaavExZGYEnhwsIJLNAGc0NyK6ksomo90RkYfAVbIh5X5oEFJcn1baPL7pKcZVDCuLCnIulNc4

3Cp3VwqaNXMIUY6ZH27x9qRDw6AGKsgYeNZc+5MpOjSFtk6N0RIRcR4HuibfbvMbAWIJ5eaPxGBvhhJ2

fJW8YVCCA0le3f/nc/eQm4c9Gnzyn1/gxvhAt5vq1U
aj9Q0IHkGW92byq9HhERUwc2ShVqhCe/jzk7aY+6ODaYR+e0wxeM5Gl/XhbeAmrrjrFG+Tu+Cr3WpyD1

5O2bgow2khK0A+5UAB+xM9AQkIwaaUtokfmFKSkCE////ez9a22UhbSAh9IjaAWZ89bx3OY90Sp8g

FvUajdR8jPlPZi/x0J4wij0d2d5DzxHbeClLgtmwWm
18bnoVh9PsdN2o6Hk7lVkk1V9g8vODmDmfwzJaWJIWNB759HXaP9rAqz+lAMcVBGlxFkpQI42cb8ZLTg

isjH2PP8DVuBAj6Jd8LmJARnNaq1Yq7/CRP8/iblf4yWFJRvWT4WX2Vzf72NXzm7VLbJ3fSoWUS680a0

YY4f1fGwqQxbgcuJv4l57QsySQWv+k9z3H5EjKzB5m
NXDWns/kq1q5O+eWfxrJzd3bxrb0SZWwH+WUhndroGtVAwzs/Ehcv8AeySrMKLYcAp5YrVmku5Ib054A

YKSl85wTwdkAuVyMXhr1J7e0ZlPvm8ngA2QgsCiqXMFCTdlk9s0oGfpWTbq5ghKdXa9KF3NHDQUWu+9T

jK1iFnE1y56vjH37fSARi5Pe9zJV8jPk9JJoiw/Qht
muATpyP2YeHI+pi2zM5iGk9+CnR5CmJRSxePZfKiDb92BIrY11K9YdkkfFO53AhjaiiefllkuPF8Qz1y

1HK68nCGEl7FMO+JEjmJBQcidqJfby+CRwDHF318lxBDKFiT/Ufa3ntMkwi3vQfjt3pF4Ekb/aUrv1dN

bQS4tAl3PYjOS8o5bP3AWLmhee/M2DLByCllQtEApd
1s8IyecpdCai3jkSUs49agsNghrXSKX/W16iwVGgzrsJr8J0S3S8yi9fJSd2Gl5i17DYvA4rhDmNOXHM

YqTjzksStTCGjefOqH2i+8vkE+DTuM+D0BEEVgLQGZKlSmSmVAo2vpy2R9uNTHigQBPbTsUWZ0ISIAy1

cn/QT6wiHYJMiQya8jAtbdDL3gaQOjAHQrszyZ0vZP
YP5kMeTbNnP+5bBz86XlH0Can1I3tS/sAEivifXvt4vtVt35WcXjvBEK44OEfEEwR3akDLXALGAr7+em

dlFhsZyapVTO0iMoO3dBKLTPLUM3FzNvp+t2Uen5gGDQ5pyrbdzeVDjWETwecfOmIM6q+86hud2OHiAK

ie0XLrIVSyUjL3qsBZWfMS5n2V1X3eRjxyQVWDCYar
yosBFuHzXBvfsZbN1FLPxwv8OTXqV6RNk/xdyd5qh+KAaPRbZKrhd5kHF7HUOWGQJL8FvsiczAIZc6C5

UQfeFNSv5w+YR+fpN5qxvS5+QX/3g0yKLxums5JU1lBspvpO7y1Iy0mnrd81w95AR5BU5hBCeFq6S3BM

8d2Pb1i6GHlcq39d+eVThm2l/fCvQ3EF9W42eSob7U
W7cilbwa2pRb6yqhWfDGZHjWYIq03Bf5LE//O1X8mCQne5MqSIkIMuOAZLCHM104muSAg+U0Ji3wiBnY

g8+EmZ2QztyaZNjQxWUynD+mSLLiGziZBFahUm8YVRBL++7uLDq4wGhWYDhRF+rh95SvocRYtOPLOHcy

+BkFkQGm3bbKmrovFQreJZCl77TcFVCfpNVuxorEFl
eKVEjAa0vGy1uOP/dVjdqDzA7cndFE4MciTwezlV+FYDbp2K2OOgnT+Bf0DnYeOHAqFin+dPr4wzAlxn

Sr/wPkEdl5OV+obqmq4Wof59DXhwWtbxQTvHmV8O4asnsm0lLKDc3ISeFejabFOLT60v/o97xTHxjTsD

e8f+Wvfm1Q4Th3GL+5YE3Uh9Wt6WU91AK4IAFUpRWA
9azlC1PcwrUspPtcY/LVLIMX4uj8wN4CLgQtsf/QKfgtyvwmiyHPLSDP9KBRiXn6VG0ezxSrvfQnacGE

x4JdKRP1OM6236Y9jhAk3gTrqL0kmy2fPs7n3BVO+0OIwOAUEaOktXvm89nyryeUjvIOzbif8sZWS3mf

6sBPS7KgrYMPqru215gA5AssIevtTaM8lpwiHYegwJ
OVDwZ7qwO+d5F69p8weZSzjXZyrbksJzkM9jNP1kllUUJxhunAeQTf/jTFYhXILOUbzLVvnzh8caAmG4

OZsvWvyrQxvLWGsIQVGTxTVj/l0AJwJp0dAFoz78BcYu7Hp+zyk+8kAPDGT35AyNgXjx1Fwi4cDwbnhR

QLpsl1Z+zpnou3O/cFMh6KpL9VmqIqhANqwsHg+7rO
eji5edCga/7orn6CdoPjzb/VyRIiJnEPSmGA0ce1gEbI5DBq33W9a50yEEZmvH7qEe0WlrS7jvCzbYsB

KvMEZR/dSxnh8Sm8Bo/psS8rEFGoZ2pO0ZIq4IDvg3yW1kqyB4PjhSOLkex4Sg/cjjNszi+C7l0O8xIj

xdVF0HEKZFGuTFGex19tGB1TEx5Kkdo29zhNhn9s+f
32NQsS8T6084YSzH1h49cvi6yJQX/SKhi49+ehn7TNwhSy/7GBkxIVkMMqedfN4+dHBZjkypmx0uwM5D

96kTHFjGRIU64CXZK9lYkaLy7MnZD+sTEbDkHYPm4qhb/zTL9+EhnnwKQFBcdzLpMLdxmlu8RkChLLbv

rO/0dJLy00RyoIV+guBuv4dmufQ0rK6s62YHLuZfJj
qKJIEuKkaE7lLKmkmjtOxP5GZbMvX4EclryF8z+0EDPSxSZiA8RT/jAWZUn+Xebu1teATj0bWLLlEgRh

TbU0WfI/PnhztZFQTvXRF0bG6g5T04bcE7E3s7sZG33Cs4l/ktYDtDf/VSfvJyBMNVu1VWuahi6OFodH

GYkrCfFfTRvTrU6m8Abp/YQgCCI4jDllPlOfGA0/OE
BKA66C5n1fIj968Po0euaVUs/W1zHAPLAQyaQN233kqdpNxF+jhGu9JdJir/5xz13CuxqRRDJtWIN2I3

iG0G3q/GCkOvHChD0BoxWStYO/ncrUsU/FEJQUuBtFoAs1HNtanHmxzo36frPwfKewCqxEMXca6FCGkU

vZl/FtrE+s0k9r78qbFNFWhOJHObw/azYWTvdq62et
Dfib6xwR77q+3Jf0nW/b4RH9I7CuaQ+dBlcruuAAV+qj/e6Fdi6LJxZMa1Go1rlYJ3Zcd2cxCRAkMZ3P

RySx0+G8+O3koU6bH96jn52yMGBrkNJveaEZ1OP+RR5jub+0qCxUxyC+Z8bzp/0ZNLUasjUo6NFJFMvP

BBCYTLyj4BiYJQXIekoNiD2ODl34vzI9eRiKsRtO/J
TCpQODVJQcf63H9Y7ERR0eK23UEb0Ib7B/YDsHrD1HhVermeiaB7q228RoqaS/gblZbFkD3D+JoQL5Aa

3ydyS64BTUR8/dRS7bTzoFTOMo4S1WKpMzkNWb3jJFLNkZup1tt1EdCi1+TKo5vJUE/8Bkoj7akvXce5

2svGJcARdIY2hsRzinjy3tc/eeo5NztlMdzyAQY6F9
4hNjtBpoFBj5EEdTMFU2ntRsEcxSe63L2aegoKDs3zvNHDAhGlqWFkVELpqCt1qipEb/NHCKezxaw22Z

huZVmolej6EwmH6ur+9SWm86YzaLuA2Uivco83Oa5OQSG9AO8XVS44M/qeVCnUjZ+39FuheHAfAK4ffd

h+Rumu7Otdl/WNL8UyJGewJgzPpBIRisym1hehrnhv
CoQY3ViEnU6U3tA8CFBaQA3tUWdJXFuzoEFW/WZugOLTbuVX8H+oGbqS39mqDIcrk8JMAUUDlo0Ktjgi

/mSDDjo3Cz5Izgtr0ULv9euEHXCroUVSf3g6CD8r8o4Rq+3sdVNTu/PuAwSoLebzGIY3pO5ss1DGodTt

xDArXLsoSsIRlbsxTwE3lE3u3DKN2Xqd7ZlBxpL0p3
hGoMmrNurrb3omGhejmSthEDzkkaM3kkkgJr0tSfT+rZR9bmsbM0SK6EMJvnrLG9/4+UiZ0uXGRw2WPH

WfhmZflUFeyM805gWC+Rn/LG91wsrIHw09ec/lvQBPXUClXmLQLX1TsmThJbPxUgbQxCWbNpPa2+rl5U

/7BQwhzLrtcwQKc7e8MKzRWIkF9UN6YoSHqLBJbAEA
SwNVMIeo47IsdBGmGgoTRTyFabBeQTPtv3Cxh+S6CVmH780WR/YEbPyvUkPZ8J7POLUQjcRpd0nyf15l

RaOxBSkHXpGL40gpDvmZBMWJh/it7RpIi0AhVP0KRlG4ZSsFbqPf5sYJ0gEnhQuFQqTn7YT+M2t/Geo+

xB/0UzjEXm3HfvOHVssUXDj9DKifWDpyeD3g8J7AK+
o/dMQNr1RgavtvHaQJ8DpzaYCFSC8CAFfBMKms0C379E8qEEV4f79Jqga1Xgm7p6kvS53mzJBPROvcPl

eKfs+8S1Z7tAANmNkVNF1TJ1FO9nZYvs6W+FnNE6eGYG4xUZ0wBg8RMt0PzBuFtSt1iIzLpxxPc7Hxjz

B6e+aOpWbRvqd3H5R6cho41fjx8RD/GXRliE+kH582
Saoq7TgIUyNNl+Q1EbAY2hTV1DEubFHGRZTLtkkJ6f/7yBQqOULUxp1A1U9z4hNg8cYKwsUh8l2Xlkvt

b1wXMeJKllI4YmMINQPOWExhymBnVf5sLxZUZ72Eiyk8ZLDeNE79vuei5i4Ju7SsDoPeezkEc4O/+LxW

z00jVHzRCtSqISitccAGpsHzhgVDvq6v/sdLL4OT/Y
qAryd8xel4kGm1nyhNTww5K44y7rAOgzO/FH7ZHn8vZitm4qCNvqfCvhgAamJZYfv4z8PImoWnlvwXFi

WnmCWn2c066f8W4DlBS/6uRvqavzQ/XCvprL8bJGvFG1y1XGsXTY7QKut+Et0ngjqIUkFEPi1BU5kBDD

kqf+IJMvK3kfXFR+pZUkWN1ULrd9ACW8zwu0VbjoZE
8Hq4plJh1B1NklquvdS7eaBdlbmE+fqdoTI0bWZ2+j6hPFF68iRL1yKjuBWrZMDLWpUkqGoLB0cceIpY

nhqky4QZ/Bw5oOto8l2ufM/MT6zuGadj61RBsYTJSWMIElTvy5w7AH1f3R8rt3Fk8RXFxG4wYL80XuKp

LQT4O3aqpAeoboFR8esFs8lFfXPdXLiNRpdzaGAyZN
fpXeiT37VVg9eZbS8O/z1zfBf79PK6ksZPS23Yr1fZBtPGCCcte6htMVjf8U4Qs02MlfqdjnFNuN6k9N

EaYVD5vOgda7H06J7YNL9eret5aCGcbGZinVYKaDJdjtmKHw0uZJbVnTt9EWtHbiul7tFOVGdWxEluoN

4lQEUd9vLJE3nZOD7D+jKf+mXeTqtyOtpwkGEE/ctN
eF1CC9aV3OLZn5l6VOBy8aV/69W56BcuMVpfis1/5uMwREGgj4m13Y+DhqHksoGKxodYr7raC0r4jDX6

l8cjFNl8sjWj1nQu9E+Pyyg5Ll6g2x2vsQjgvPnsX5tL5OtltSBPD/dWmDOYromjWv6H7mzev+bnOT9H

8zt0NUycYwNNe0eE/9962+yb7cNiCik0wCffgN5Uzl
571M27ALXXvwElyD4QQo/aTAxYEam9W9PByrzImacZCDG8I2ApPmaQDQnwh283zh3NZ1wvNwDK+icRQd

xsXYyGZVAdQ4YkUEE0y6l/PFb4zLq7GcXEZMap2hkH//Kfze3lyPNObaGUfLWF+b+F1CYjxe+e74CvEA

Y5tfBtaGfiYNrmnrElU9LKncUsS0nrI9ojVctsazi2
vXI1Atek14s7xcEYhIYn+gKKeHyh3cF4Gauc1DCbBJT/4ftqwaE9SXPmrWXdd2C9cx34co+V3pCHFd73

Yok6hMe13k4mQYP0k8AMIuF+B4vjiwANcxhEsUAHyS8oM0edCA3K49XI9AC+BVoiNrTklx3+z/yEdD1N

3xn7I1xe5EVPfeCnzZ2I/m7eB8POI1yJcTOg6gQ7In
adEXPBt2fKQHvqdMRzLL/hxF6y7NKOc33C81iS/Pfx5sA1/pX+ou0l4ZJr83t8cIAZ3yMjQfnGIXtVbY

URgWSItE0tfQV6zCiigG1aHWlZGrL090FYEbpncT59eh7oe3n7D1T/4BIY3B4O0yIPBT5uuaiZJr3azX

7+PyEhE0paJI7KlzKxY89tM0f3t/+Ie7WkDclbEbwS
ty9GO91BunDvjb+WhdVXjFZIrW7hP48M/fjqx3b9hYYon3SIUalNW35gg912+dY5Q7W0RWiC8wjqZiHY

pjX07k1+IgpkqqpTAMPifhP9u+hw7NwmHc8or8HPD6j1fxos2xxe16lQfN1pWfRV3oH7xh1zGLbhbLW2

t36FZCUJlyWsJwOAsPhNth49m4IW+PGN/m8F/LJHfy
fN4aYYTOdO2dJ2IwjpI7U1LAYNCG8c5wO+Jj54TQjZkH2qORPfBKPA3ByioCope5C+M88iBKOx+0aN77

SCXdhlnwTn0txuLr+x66Gr85l9uPP8s8OFA5pqDFIGCfjCXtwYcD6a0Td7bM0/LYNrQlxRwwDL//sSTP

59MAj0jgK1iy+L9dKHWb7HJxJygG+mN4zlJfxZ6vzb
qsqZpyBRv9j/v4e55P7gSZeetdSzjhMOL4pVgKD4hNYJMlzu5EFkxB77nuD26rJVBK+x4gmGIwi+7pFN

uUd/Pf2VamzSC8/o0m1yx6FNdc141M9EmbHHwzdoNa7Dga22wePTqM5JNf8QAG7bz0iyVGUlQlhwPVpk

quFgsujKLL0kZYXiNdvd8tXv/wQLHHqW4Zyd2+q6gJ
UlJBy709Xi0G391oSBv1MERaTZ8dVNOmxyER4Zy5CvyghND1VxdjlZDRFan/pq5Vix+jrsoHbuySqPJe

2ZiUeLGEVhKtw+wHH9re/2tcmgyH4erYTiSoHXwoN65GJmwiSSlyur8hc/SVWjxGvEAlsgrj6i9NF0Hy

fptQrxLhfMLtArO92CxMXBlW0pdCc48fuzPTUQ5AC/
tTJKDoiXFQxVUiNE/z78G5ISCCu6Znhi4NOjodwfB0wmQqUqGdOR9A8zuG9gsl2DcU1V9FVKsADf1vjI

bRGktLzeR6Ssrq14Ld5Nc0eE4eg1Mo+3dUwJbtTOrDaGfuBs+K71lALkLuDxX2S781N3snXvYvzGxHOY

SQSg6BAMJsK5scKzw0YneSqMkaRQun71EaODEcfFOs
ZFnc9hzx2Qc/FuTpkrHOfCUzDi6SlwyoYRHfgpTSOKtzi3wBQ0Muux53ssmTR5RcFtTx0baZcneUrxGG

+whvD20cEYYcKXMbcyENhgaDztDjsSBvMJv2aiz+Cf57o6CbMhwr9NEP4nwp8qIH8U1mMYjkgFFtfiM0

MszooadxQHiNta9vaBM/oQ3kypL2d3cb6Vso/sNhSl
w+szxgeS6f1dj16oFQINyOmvoksEGPgH9dSVaisi2Bi5d7ydi4RDBb8ZCxw76NMCkIfx+71Fuzt1PXaL

fi5cT2eD5nKyYbynRwkXaGVqiIhGfGI8uZn93r8Ox6ailfp91CeMd1cE8cUxxRUpjVXWeDLtltpVTWjh

Kpx2AP7m676RfuGfB5AVioldfHR/hupM+tu/A9vcv3
afG0oE2BQ/yIVo+RtRz/cZ1HhqrIwHNjMQ1EErqyhj+LOGWeP84RsLsXdesJ7Wx3EvQirp8L1WJNpCCX

o7gl1aUhBvaEhkTVmWHPytFey7Mz9mF4WW8BXC2ttcB1i3VuAmYc4lC7b1q3nOimqWbV98jjuCaFBpG5

29a/VnwadUuzfWPipSrOniW/jkVTaflA0gn/yTuLu2
fpjm8+8RBzm3hphdJvhNUnaiXuxbQ7p6+VgOrB97Sh/G/JPzOFBmpGIEEOZakK829GcHTNXQ2HbX1wJW

+px2ITn87UMeU96Y8j26u7fvBXSNtrhCqCY6PJLmi/gk5JQKsUdzzES63uh1uwLxiXukCldDzjYajA6s

FEJWXvI7XOYaexuXoav9EROkLNrkB+wSTELkRECHUa
vhv8O2Jlsz6TVI2LlJqL63zObudngYIjZzKX7QRF2Urv/Ub3hJC0KYZyyhg+BB45IddT0hfPxbjdUVij

tic7htjXSoMTs5DJs0xJlo/Y0Zxk0gl85FCF7Nslz/S6giHGNjoIJaQhBXuVNi/FoEbjrfmgVr2T2h5f

hu+NZ+BvsW/jUDsHXjqhZZgD2MhhS+ta5FEyshv31M
Mckenzie/H0SsKWnpy+cP82lyyrpm5e7pHQtn33fQc71Cf35bNi/wtj5WaMszWZapv1vSVAHnWAumI+eW31/2p

vtFGenzIsM7FP6ozMcoNsUw77kjEE4kCGuVqSGknmMDDhyidhnfO3uwPV8cAgJTnaQymYsQSsr8tUNCb

EGkWdDk3gJWRySazF0IYNMFoZONVd6SJogjhNEhMRD
eBRGJZD4R/YiX10mN9240oxHBLC/rmiPv7SuoDThisoJej8NoZiUTSFP+ODKFsbCKVSqPUST3HwjTNjS

bt0u/5SWVvHL+jk2qtagqP21rfaDtppZj/aTJJtrB+h+ZCqMvRxQ/jaMqnck5+XwNLEVRFsaj7PLDJ4L

g/kHKmVa+Pfj05zPi54B5lU/2QQmOKjSYB9cNi7WQw
LbbE5aGGGfYaap17NMrw4bAuh4sOD/0bAytJD6Tgnu8mX4snc8N/iPPDNkXCuwEaH4xAhGUBcBx+72ii

MGF/rqPjJ7fBKY/MALuHS6fWxtqpqqeDaWgqHa3s6T5ntaRLfsw/atscMq0AhQP89/4EWzPLT1hj1yE9

ng3L6eLkOakDj4LE0wTQZXA8W7TxdbMxl0SphLFSKM
sBP/kU6ySu1BEJMOfbiuVKODQWteEq0AZ3e5lIig1576yzehg0XhERH3cUxbcmRHD1AZ1giDP1tSvxL7

jCtaBrq6UquCDdGu3RzVuiREOXu4EmccjI2545bKzUiWt+H4zlRvD+vrD91jStOkGi/quYNmjW6WE6J1

vW16leF77cYEQ6d2EqK/sWXllr7juMCSN0GJ4NA4cA
gspzP7Km+TEAwLyXcqaS6YwpnZDGDM/EA2Z79+rnntNpwAuVnxCb9oPR+iOOI4DsvsQnzwNKwR2y4Ney

cvCBC0KpTZjqWSNn+cgpWOWRkpLVrt0D3g6VE3jIoml7iaCWuKa0iA9paXs8FjqME8fpjUdCsKS24aNn

Ir6RFctyxw7sc3ERzBCWsA5CQvpShPU5RofnqvR/C3
gy24Yxni8VogIppLVnK/nQ9mLip3Dn+mPjuixnAs2grtPlSzPfMVMtk4UVFJ1DPUCOWu/a+pdgs7YZqv

5rDh+2pHyPzTpbRklnf+LKoCrxWxiOXtJim9laJpBEt/5T9R4Zwfsrr2PxXZEDlaRWgIWMmAAwXS9BCO

Ce/DqPs2b73s5H9hZzM6pvHu8F5/XcJm7YVEwKd1yB
8jISLCTL8WDyVprgIXHBlOC3saP5ZO6KHE42oiXuIWeRnJhK3ufvXIZCQwO9uYXAh1QRBTzXCqtLE+nj

Tg0Hdw20BKy4HLhpOG88jNYQLgCx/bo4UdUJ94uV3zDNz6kZLxJEB+gU7p3BMfdkedGV+cCQpirPr6Cb

jzmt1MGr/mWSCSBr/jv0DIcdGJPFIQpMxgpish3hGs
wTs0oSuhrnkyyNIxm6nn4fv6z7DU5Ek/EDJ+mvIv4ubdrEXgfTvSc5YycR98FKQJ7N1wvZiwRCK/AH/R

CEN4gPzUj9KOQp9GLtqREzj77tYohaxxR6n5k7DzUExOLv4UtsdKwB9fIfbIAa1OpmcZugy18UE0UuiA

thB3h9cfuJcq7qaC8pxJ+JjwRHlJJ3a3XgCdF95/ST
ZdkTZUSjpA023ROmRU8uWyp7qJl53w+wkNf/TLvHx2mvwwwUxmWBKealcnXv8w/3Ytlv/pmX80ot2uxK

9y5J7OTqEveuFQp2z7E4lHnAbZYjUWZUccrrNqY5psJ9cURonUH3F/GHYVJrGKXf7xvsJYwQbb8DsZs/

AkCkkXOwLcRVb8QdHJZfaGnIp7Y4jh4HRSzbcDnYmL
2VyxQL159LDfj+NE46RQaerhgCYTlZ06T+YBRIdhyD9iddfbsDN2SgHLwguew50cJL/ojG9ogG9eJEnH

4brAe2WSSQkhPwRrT3L+sFBP1iXQQRjFvmIR1lDV7AhMmcO/o9rFoyokfTOwWpm5JnV0LUc1GMMr/AAe

Fj5kSxDMQIuZS5L+nh88YK002+dXmm2qc8JgKg/CmQ
b4c4UO3gTOiLdua64WotI+yk8beyM97Ty/WEeLn5+tIjg3Y2M/bLMn6rwpJ4HIkp95c4OMjjwv59GBoG

yRKOFNMc/MuCosU3BZWjB863ZN+vJd0c15D6TDwJvZ35/3PnSRRxqjp2j+FhuFzA0hn9MUeCKt+1UNl8

N2BcLthRg7v/XrffOVMxlxVLZ3MEg/84k9oQDHEd58
/O6bAobOMNf9VhgAdueDbiWlEXnRc0Yo6PxABg/4eAWlK1HwnqPYSCB8l4CZFjJ878/WS+esVJytc51G

iUfGv/ymdFUuzoutPrkav3mA4FubHLr9wuXiSNe1+Mem0GNE6dKSU1ZnArsANw5AfzsP68hfCGjN/X7J

HJClxiHnVZ0axN6tP+W8FC7z9ObsDzD7heLAODjmsq
2efZiF9D34pnJNZBaw1Rxk5cbCq491HQwD6+0c2c/Ng8VEZqk8XHRSJRuqOuFnl5qXmPbavqYO2Esb+o

O65dYQ0UZTaInEWukDIjU+sRMwlgFDcMvlHhNXiD6eW+tiLP9yhQFLIR+P2quc1FLlz3yTYePqjSYFI7

ArxmA49fF3s+tyt/DZ4jN+e8u4Ux9JAyhCHyWLt83q
63jzuXmTUrs+d0DPmZ6SGE5OdP+z3WZluJY3dj2a9bOjcGyO7bLIyxyxLfXCbqsFU8+OomE+WrYA1not

ISqxSpk2CH/xfXcXWXDybdfnxHp5D/wdTx9BHBu1AnPZd1xg65/cTZ8PUWVhz81ubtGYIR20N4cnLwv0

BJjQ3BmbpwIv18fXgEYAvlowrEg/rg7k8b/N4XeG6x
YnlMe0CeO6sZMI0KISq2zhxLPf+qwAm0VRRqsq1Og+s7LFClD0wHBRKJqBGFf4/iwNUpG0Q1tLgEy4tf

ORZUbJdtXQHGeVXSEuCY0ifwdRuv71NSl295tqFkCcITY9T/UKZTuyjb28m2zBAEEyIwDP7JG59KH6TO

v/yXykYcfVUuHoiNumUadYBIfBSq0/yMEg0b28qkgI
BQ2Q6b+JdZlrWAgaoujvEg3siPla6+OwsHkZqvzYiIGeoeQFNCbRj9smRttDHPj4dWUv8CQt37WDg1wm

73W/rUMIAOkWsdNVO8zzV11pv7MO9LZl+fbaxLvTcm+102BvdGJkE2yWNywQm4hNh+qS//inif17Skyj

S9tV38vg8+AHAXb/AEbiumuVAIVXZwNKHjK8fLgzxi
rMhRnsP6ajUUAb58LSqA4enWz79TaVJYv+sfUuGH7VowmZb+US3/a2Vn19xxiPzzlMEk1w+L2p3+8jFK

Z8ueTGlg5kw/jPEuIhVSMENp5A++03/vn3NrwOSjGSKoHyGc68ezbg4MsR9o1ugUDn4+5gtEc4IUItkd

QYPEywEKLRQ0bXHT8VDvD6v5C0sRkptGh0Ww4O97f2
nryO31e+5+Q9ggVidIOsDmBFlYJneuHNBPK1jxOhGEcFTv7VHBnXD8E6icfKt5CBK8tRPlDHr9t9358s

2d0RstjON7tXowahZdM1PcQVqVX1NFpaCyZTgiiwV27XeQ99El53KvHmzf6/YE7YcJvcm2rpuQgu4Kdv

ZiPFNYI/U5BW3YwFSxWtKryUVmawcG+xnIdLR1Z18H
WjjFpy/UfSd2bGGOJ0OXeaPfjgM76vjnTHAP2ozPwf2drujCRArNiuWxzW7UPvfpqMEe8RGoBAUpMz+z

y4hUM1b14AVvpFRQoiWRgtGfgGfSVt+9p0NqoZnRd3x45a97+yNF3n2Bzu+aBZVtOLQCnIpgG3he6w8h

AgU1Ivkt3nwoaC/vny/1fi9/K2qSmdbyuagnC33RoR
egkPy372ST6ZbZzEP8Sjlj2lobZr7qVPzgR7UV2+rCc4sy8A+eGbFA+vh/MVq1YasUYFYnGkOmcLYzkV

uad0PlG2JwHGbqMtXRZzEpsCMvvw5fR+khgl07oqT2v1rJ1EHgHOVCnhkimafvIuX8L/SpcsRScJxVPR

zHQZAUzHu+dU+9LcKJ1Vy27nRFKDL9BhCo6gGF9IvD
Chj4050LEOEDeyCWEGxHCN/lRxbXllO1/MgOzK+qZuY8V3gcYsZNFO3s+w1a/bsx4cKjIxOLBrumlstf

y9ojQQmDwtdexwwFnNYoyWj8ifhvdHxLoNQG3pmN8K8pIT18oTmfmectR9fkYYT1/SqeydI0Fgi+kJ+n

D+F4D9bfz+n+Y8K7Qb/qfjGw8PjYKerzJof1l3iqkF
/DsAsofOv0EvRdsMOMIgGGUKSAay+yzvZZ6hDXjRiSTm3jtKLsL1b8Mq3UyQcb3K5BAjwO19PbPlu/bp

kAsK4GgCwbkvtTLZbML5G3NREXmhlP6xeXXza99ctHuI8x32vA8b0hJW12y8xtHB44hdnf7YsjXZydCZ

tSRWwnXdVeiUqT9O8rU1v/xl/2YEoRBT03DM2PLpt9
TTylElJw1GbdTCzIdfiPouQYDtCs/1NvjVEAi8+VlXMy87X+h+LNYfA35M64lmdNkI45fRi053M48s8/

Y3seMm12HDqEb9X94zg3DuEetJBqRLkQAP6EstCGaw3s5FZ5rAlndeLFsizI70dYCxd8iSKPiZQbwgGN

yaqsKKu2Oa1jPvGG0mytyM4knjfnhL5FsvB8rbDgh/
b7+fmZUxgl3NRy1BuP0Si3Ib/0mz5F/ZxKJ41FmKNJK7KPSmxsWoRGqkbxFESkuBOMve2upsA2OtHqnv

aTDKU4wWAjLMcmhmsP+1B5icTfmhZ/Ku36mffK4cr5uUmSJFhPSqZV3oAjVB0btRJOFHnF/068dkPktI

ixUd6xcUQQMh6FZ3w9LS6oyBxXzmi3s+tp02Blf6Cu
g8+C5Af2ZfymW+OD/AzREPxObWnP3lg0HI4k2u7jxnKq0KQh+b0BOfcqS5F3wYjxvSLXLrZsgCYs+pry

Py2EuBBsSxI8Pd0kbZ+gyM2o992WfZq0gWvZ62ckrhDjA5edngUFMekFoFZ374J4TqmJibKV/dY/G/0E

Ti5StLjv7lh7OK6mq2PyNNhmGNXEoJlHOVHZnrUBDs
JjM1+AVoAcdLcdpSrs/tu59I/1Zhr5ut1sMlkjU9S+4F1pB79haP7/bsouYXmsXuvLAKrsGcW1OqRDZo

NaqCOvSz6lPIHuCNj5hl58Sp4ErusuyZSlMgKtLiG3l0PpVQzOGUUW7/u2VwwjK0ew705uOTg9re/46f

2BOWT7KA4wpPc3Ce2RTRs4s+8L6NFYI19oZG+arx5N
RquxkMb6fDqB/emOCFQxa4UUwIfuE8c6v4uVALeFFufOz3mZBbFgr6mnOTS395XOFiq6E/Flze9Ybw37

poOXvHdGZPlh+nHBqpBdMnujpKoFYf7UO3t6kEUpiCeS3PrFdBvdgPvXZBCy9ooVR4x2e1QWWOELEDd8

4LSs0bf+QmJfhWlbDZnId2F4Bl88xfSV8sX62BivNF
oITdHjAGtKRsIomwk9W0OO38ziwKnApGzoqMY1D8vxLgq6OX7u51lH1lRjX7tTpWRelhherXldmA/OfX

FdVvN2JOaKoitph//XkpIiZPCerxbvjTciZ2DtcJouDSr5npkQXfZwQTYBVKOhsgW6ekk4VvRSG7eq6W

6jFRSO9gbceDCXYxLtPpbN/ZfakeetW1oLdOkscYnJ
jt1wan4XttFIBFGxzf6IhyoCFxe2+DYv7DJ2AuRI7xhhOH2tewUzm3fFXrZ95lJplWfm969xzIwVnbvK

7JMP93dH9NR7UCxYWF1kPu4xFQ4epPy+WVHxCuxwGcFp1PFJ+R++tls4ZMeVD7ktng5NL5Y6/MQehhNk

mCtgPZ6mhSj+41kNkXVuN2/I7HYxVwzFAFeV8ovIos
/XLqmFKLneV/hCjlTkgir8/fer/WRvyvwWi6T1QXmfWCE2RsFGaemu6T7q2FYVBRyWGQTMPACfiA+Cjl

TYj7U61Y3jWAa1tS+wN285167d/avYb+75HEOdUcvkk2HSEmtYHa8cEPxGF+G3FVZ9/kwdRD6WzXUdQk

H4/oJ9xQWwxGpeBFi5HrVbV/qd9B/IP5B/IP9A/oH8
A/kH8g/kH8j/ByEZ/3sOkW84/33wRrUFofNCpRL8658rwl7pZ0n4F0xXfzyBufsJmJPbPqkMgsAAl3hT

b3e97RFXnRpVhJRK0pDPq1W1a0Q9jeaXusMA5K6zLQk6UDwWDswqThY8mshB1kSIC9fUcZjb1hzCA/kX

w9Kn/fLxTFnsT6GXQy0vBSg/4Pmi/kInG8hUFFoZ1o
qjEuDL8++5HVRbeZMtCZcC46ciP9ZUJrFonJ2BUpEgzyy3MamhbG+DAcS2RQfQuY8BHYxOWBkYpCVbr/

13nt8aBtUuaUtjeasPPMtl5nuSQiUqRLWsoNIX4c5rkGKApn8pKkIFuwsIeI2R3I/2FNVE7kTcyXGLzw

VDCqXA/O0fCvy/VbxXUGRI+Z/kgcCaVqLQQHH5LOzm
dziVzfaYFOh6RGT5anKo0x1o2mGnP0UmmfmtWkDf39pGnopj8mhkYl9MtYlZaHGS8K80aAThSX1M9x47

b2112FiYa1LMKiqJJ45zWq7pGee62ao+AV45mx2AB62YBi6yQtMPVyDxGpWRUG9Cyx9zG3aiwHnyndi5

31jGXYjBRHgPbpD0373r5aUlM8rpqDZi+EhlvsBlHS
86y2NY+BntA9zuU9mdFTyNeKUDyJ2TPs81iAKqrDgA6UpmOJQ65uQRBuSlWyEEYdq5QTnkYzamn2/7fA

8oSTNKn7XIPUOVyV0ILMAtRemwlTQTThGdXSR71vnOfEj9/Wx+S8Ro1k8CIMPMNRpNAY9YblEm5izGHR

lK4HO0Ywx4gcmYj5I8xlcZeuwS3kVcyjTuqYg6rCOu
BkZlV3NWtUM5Iu/ODMvth8M3LVJSNYtBeRc5YGH5Hx4rc34w8zK+V2KCCAOWeUZbmPHdv+7lMy0Ummp6

Lulz9WOfDHUcjSJaQ4IcT24BMXk0sdwjJJLEfB4kQQ9g0ryyCxA/+Pa399tEq6ZlZ+O8nxlP4mDgPDt4

NOKNZPl92ByjQtMEVaCehRE8BJcmbpJG9im3H95+4c
IdxM1aSqzJ39jXbaKF+lVvC93XdRBRXfje8FcU37jQnWNGXMJowOC4FWNmpSyrzeOWLWh6Zk/zBjerRw

iZYh3pUB7r+IPqCK2Bqrhyyo3xDWM8LSq0P00mTV0xlwU/Y+wSUCz7ttjQST7NvuYJvH49ZI86WP0bdi

crcCBbinQiblfio6pNFbRI6mKuyYpbq8aWSNJAxi2P
j2DjCGTFuZ7y8BfzBrW2wHKVvrbKjQ1OO28JDyqvVOSYEATapggUOQBion2gp2yP2ckytGxFCtPuABuw

21b49qL83tnD2uEA8aJaOeBysd4Jx18AsA43cdAfmmbcfW62QjG6wdtiKka3tXoPRq/a+BLpokLDYouf

0tv7KnJqGgzNYGIjoA4ejsdsq//c/mpMu+tNeg05+p
n8E3ar+PRuVGQhWcfG47999KXhrctD74upu4NNZMj2Vu3lCX6bxYZg9zc37eVhl1qVZXMLrStsF6Kflv

scb52c/Ou3dRsDbAvdOAmt0/3lgwYl5RCCWipRlqttHf0/daeon5OkH4QLxXFZUT78T49jP/kbc7nh84

0Fj4de9DwKvlmfmorO0/9aHV41ZzqiTHzIE2E43kV+
jy3gLImFSJ7pUs9coBqg3DSawCslgMH0gmtI8iRgy6mKLNejMZ9KusywacniEpkveCaswb06zZy7tTsn

H7/7LCNBrWydc8aNZi9SMrxEhfWKE0xwcsR43wMIxzu4Y5DZnjcNNhKqhOJkv/tqrmV2kJWCo//B3ltG

mue1kbIxGWvK6CGYsmBbJWXeJWtL1+PB1R1XVxQsqA
EKs8vmRD7Ls1hInKDxKXmYn+79r371x1vORa+xY1TMzpC/pJ4ArkbjoP/s3LKf+10rntng20zG7l8gE8

WtVpx7urM0LZ6BpKhIx17eZbrSDKqKrGAYTXPdLVjO9sXQC/Aakapzh72FnLvqMlm9xOUw0GCLDnd4S9

M1Cyh0gnMmEUBDm2M9X1LYQPeKZ3aIVm2WOXqk4X0p
QXhxTQ8h+AckTyL7IYwkGLNOaedVKTf6wVa1jeQzukErG/64JNMnPghCSNzpKIeuxSrw8fgB1CamEAcs

DimDM4MW/9FSNtza7sWMik3p7c4ZW+F8OAiI+wJ6vGc35Z5ykv18HkOZbzgKyBgSnimW92iDvjlvKp3F

ASgZeJYK01ot44yFTP4qq3locOXAHU7OjklWdAEkbt
N3LPxLsFyYED6boxKG/grHkY8Sy69mPUayDk4oQOT4MRRww+Bt9Jqw0hKYm0HxzEzOruSXtEVF8XGUKp

JjudjNJhG2+xqH+S8P8y/97mMy79gQVeDv74/1LfvVvuXdjOl854aelCOioWrgXbptIRqHQWg4MwuU37

atg8tFfK4lK75zI88uLZgTNPszArV7h/EUVYaVJBR7
uBjzKnPrslWl7PYQzgG9DDh972ip+HAEaKPmgX5SsAbAQAd3p3rbaYQtsLGkZDkfjFyuQsN8V3H3YQtn

iXP2MIpG4UFsXddswx2Ocz9zqS6ZRicYNgbcpcDfLwYBfN3CPFWT40cJnDvI5RRHc/FqEHHs5sppgiqt

unAZzXTxwoG1o9lXvGv99R1IxUlym8mlJIpfHzPVze
fHCP19HMx4rS+S9yj9hc78HHZnLXO8XXyNOh+H2pA+vZlwcJsWUzCRzmDOLYdPosW6hEiVZ02n7B9M2h

1u3J/fskh+3/znUx9UTi9vxmqjjC+3jtYQ+6kXMMreUVo+HuUgFrzA4DNv1U6Vl0uOmYZTOhU8MXkzZr

seIK6pZyHTIQt2gmAfirp8EsknuqeMIolLtrgxbYn2
5mDzbgySqZQq8Ya7kAe4a/EOrcHTch1eKdz5D6lDBTzmT/tA7LIZK3KIpCSkpy5oXWVH75hGsb9OD/D9

vCA4z+K0/84ympiq9nzkyd8C8cNnj9poIr45ki0nXEhV5HEh5xbPqnKWwbHNV+V1YyhA6lKTw9jLtqnD

twTYcmG/OpmTWryAzqgr1E3Yd8772FyzEsLonE2s/p
Rq64FXsq/Xd2RVead3ujZupQtw7XR50SqKHcgphCSaVzMo7mauYKH0Dw5Y2v+5YUCW4o/kIuJ1ObBeKo

w9puSTgYBHBK/h5d+iH2o/SMBcXgWfCI7g9zBTeVKuQ6dTaChASYCvZzFn9/64YJXzFy5BZcPOqS5XeB

zU6A1hN+eriqHxnrHFKXD2uLB1b8yelvUz0lmPQluR
5Bj8uS3Iggl9tyxz3vCgbpkUjgx7CwNIO7qeU2Iy9NQTmLOBaUj/5M3Z7LMPnbFnDyZDzMKhIQfV0b2b

yy569u4MuIpwh3HnBPjCjeSP6Er3buudiYkpqAzD9E0cWbuuzruHKVXMSXIxl5Y9en1bF7rp3H+AGvvC

EpY1c927mGlXKDbTGE1LoKj8HrPn2tQvo2BHvP2u4I
OAq7LGXrFqBdEDsnOCdJBqXzWilvvkMwbAnk0hMFLMFeX2UsMjohtkKJVBB64huRxUZPfu5XvLNYgKHs

ayB9mifO1ej+Zi267MFuFkHYqCJDOQSciXfZsgQOpXriTH6T5snrd8fdm9rrsQJ/huan+K3cLbiSdVw9b

UZMILiXv1XupO0r24Wsl5rLBM+xsmi4hTW4DMir5bV
cDu90QSUJsf5xq4rk3OcyGUc/dVQrm63HDhUOSRSQgZZ4z/RVuKbaJWY5+N7KffFakk95VLCxsC95TF0

IYTNvzUmxMZ29mDi9x4kievKIaUiYGgzHFejMh7dNkYlZmL0C59WtbnHLyZVzfXZuxYdnUaoLJxHRs5A

VA1RmTU3mylSygcZ/Usv017vr9fWUVVPvMbRZdayk3
B0cfHJcF9MEiHgZFN/NG4+XgtuNY4fnOfClX99STMMBfTOVxUfczI+QOpJBuchk6gXGKGlcgx5FzjIbr

LS/HWnEA0lJo3wxoxLOwoyuzqpipvOhmMcimyLq/Wenp3JkwTFQJ9D2Z2nIWE1F/iKhB+1lTVw7QZk8s

///BHE6AV4ZqZuqSyHaEVzLZvH7EBrQbPI1aCg0KL/
Ad5zofmW0PN5rRZvWec/hMNdEasUQq80aCf2Q50JooQA8NJxzsXnIA257QbjxRmKRUoEH5xMhJwpv0Rw

PSwKBMHqcZs0eW6b9yEmQNQIOw8aAMe9oP85NYF5RreNhV0bXEls92AlUWIRMpRlAtnzTDqfLB9ideMH

KcTs7VIY1fQhcTKvrRftrpNtpVhxXJyR6t77xKouOy
7/sMj/n8vz/u1IN67RJDHRHUK4pze5RmZjrHKjLghuLSgzzFrBWN3LVY30J6S+wgnuQrhptxiZSdMaR2

76zT+Yeqa7m5msFBAf5HRwUz6vSq0iPoAVAqy/bgLf2l2XDRQGbc8AU+m4CyujtZLkX9ZC5sbLJD0YJM

GO+LR4iiBEOWwkdy8smLNa2+c87RjYi1ZkxShzo3KO
vPg+FPBte2WxdvejsnhNBPXYkL6+hK7INZSv04ozgSa1eBds2PhIgW+4WJMdZsEQBDS49b/xSrISl1Is

vT1qllfFfTtglvXYWtWZac6yfkKENn6Zx+Sd3KK293laY6ZD4lXftq8VYvJFd+D+CioUcwkRckY4H4bM

u0BwsW6s5zIiBUgrmQelLRAy5fKsFfaNJ5lfqeW+uJ
TnbqFbnzLRkOGiESezP4AtzU+VUAKlQXc9AkbJn4BNaFBmHJ6h1VR4jOqs5Kx7nNdvC7QTmgod2AidAV

L7LLXFUTyKMAuq9YdNnmzQYqwpDWIFO9rrmrkTUdQtE+fEgswG0ZybGXKG4ksXr76VRoB6pix/5Kaq+1

Ybl7nLTdDnQVNz1Msh1VxgpG/sm1ka1R6DnLKFvTjW
X3IfBWPYBcAdCyDea4X0nPCdUGIrP+9A6AZ9ZyeM3k5iPa12xzHI0/Cs5aXVQpz1DC32JQcFg2c41+t5

SCWFP2CTk1+6SR+apqHnIT8KWx0HAnnPgDoK+0cAxQekUOIYXpFzpg/S6yvgRrEnQzd50r/Xw6bjrJaI

7gQcA3OTXnPc4/JCnAkW3ZBjN5Zwxiyc9uLuAHmxJb
XKSlz031SUdB2oSPzPZv5bVi2ZuIY0Md85MkwvA0aIqKX8S9IFPYeZ3G/jXlqaViniVnA6UwkYfhmQNL

uPXnKCLtoqphgf83XCE5zdk5Hh6rR6m5GlcPSm/VS75PsD76qzntGa6qWPqrHkpRr258l0WV9wDnzhJ4

fjW0BxbT3ETh3yj7xqcjHSapCxxxe2405m5kTZT567
IVRX1P8Rr9MtlhOaBNT96MX4MhvtJiVbWtZr7YgEteQedXYDTqQsyHIFzaKCqnXE/5WzVe86nOy/gyCy

O3jPgBVCMFmefqnwtyVP3gXLSMtKaogWJPTREHlUKPMs5eMA3Vkd24D0rVChNK6Ws8TmhgPuYM1NbvKq

ZK6gU8FMExqsLcLYWz+9E+h5ZNJaX21ZWy3A5nOefN
o2YplX15f7FHZwsLnLuCGgyQIVxP1K91CqQr05o2Ls02pF4Z5GRGpD6voMwwgD6a5yInofri0ofoCz5Y

XcFOPf3RqHT8G/y2SHeEGUJWH6o6Pe+nwzW+i6dtePG86jqWcxXbkqw5qWjjym6zp3uC5x3MOeO2RZbH

NrHnr/RwFaB2t4fAzVeb7p7fX6hb+P75LzeZODrlJ/
1xsvPKicT6Gttwqs0GaKhJnWsRJMvI2ai14y3T6zGxCgEu8oG4Csd+JjtGza+ZGG+o7hY3ayXZkJ0nML

Hvk/ZdW3nwltTRzA1TesHhvUQwSdke0O/9wO83+lFP+I6IIEKnYCJXLOU3hHDaIOhj4/nqiQOAOkeicN

0bI1ugA8IbYHi3OVE2N0lQB/7V017PZv0zl9Ux/Imn
/0pxnnBSszWps+Z7nZEq1jvYZ63uu++4SxpQjhzXWK2rD/Bvv29gQeIxIeNF68bSr5E2+hnRqI7D+psN

LfKigXXlyeGsv9vVrxj6K8JPDZtVbnYjxH14rgRGdYnM/prneeZ1ydzTR8c8lycN0C70AaGD9yNJd1pi

/9dv+zDrNdTl4+EvpZhU62DN5BM/L+yROKKFj7gVhK
mi3WgLVF+rLvAxOL4anQscPsFtLAOFBxD9Cn2rmtpFKZB7zmSn2biz1UJIktSYv5qY20VEwoGh+/mFgo

igTMr4DIENQk4BloHhSEoMJD+X9FpHf3HwEK9v7eQaiTqFo508Yh1DczqvvjC1LT1D9wKwoIDiyF8mDR

lZ7BD70SkqJda969Cu6LWzZabTDaqXQp+uR89A5pR4
Wg2Af22gM/JxgUkJeHmczGcRu5ZlKdHBhFKkCa756Jzpibx0cm8fFwbx/bktqqT3MGv+ifOf08v88R4a

wD2kUwd+Wyjv6tGm0Ga3O0kbtg9Z6ONl5Xn834/okrb6h2pftas3LGWKO7txedVQCbpWGaB4NPHs3Wai

WfDOuX5oj5w9TLUsnIrFawPWQQyYYg17Bde4ck5RXd
uhjLd1vGFV/1w9llxmLhwYiqANxyzrURSENPbLf8sOx/751A6cZzx6ScJ/TA7ZOic+gjmuwPjL1/mKBe

7bSUoQaUR9DD47Bsy2O1LjpBWckM4C537FKOauHEJEGh8+0LA9/KTOcGUWymN2Rdv6Ng895BoVM835EM

O7jDqETNTdOD5iD23SZUaCTpRuiBiKN6Y2d6CojXc2
ymQgRfLqtY3txEXvGMM1nLC4aBDTp6MHjr11cNim+YxMT+sVZ7CDfbSip7BSRcQaWKDwsXfbfDcP5APw

DnYAiuIViJ5t3gGdYMoXBX8P7IRYzLo5g6NEX3oPXy8qY1cAbF8NgD6kh1jLCcNPmyDkAK7fCBWwuzyK

Olwmkobw9rWeXP1jGW0Y7DflXJFxceAo1Pd/sI6M/H
P+hhFtGVtbXXppKu3a/iIrx4J78p9v+tv/2IAt1nw9zukZppX1Z6fjemh9O1bQBQz1KusXzuLAUowk8t

QM+EdvPZRV+Oy6W7JQsIN6Bf3Sp68vRa2usiLLHLp6PfD4pSKceNPKKgqD5qJXQOsL01Z+bU+QDZg6kq

2v2SvromTUi6wJR2cPX6AP2y8rLrc30yhaZ4mzNI7o
Fp43ZmDc/TWYge2PmiNNeN/ChYm6mPTxBlGcbE7mufj+MrYRSngT4HOpsyMHm9crNmSFtMu2FcPDygAR

nyK3igcr32FsStxWe6aplwt01iQcQQtx+o7eWLWEewHVaimPrwRjwWuG627bpb86mXPjKfNjh7qr4fYT

yla723TgfI3ExQ/JR0CKkArUJKTpIwUrC7Jn+ZaMS4
0PuaTfmQoIQsk/pa7waFeZXi+macDtYPKLeqAfvDGKQJZBrEC0sB2Bobzd1ZNE4lRlYq4uzOmd3oeN1e

wMhAIa+Ai81nctNuKtH9CTIl8K9iAIl+bH31WEhDo8+jMUIK/ZjhXuUg9nYVIFNRNr3R3PjUwwBPBTND

yfkz9J1GRfE8rizhxaOpG1HQQlySH2OUuvDFMtTD3S
ar/mR1Dqr7iouosjqTe2JvZXRyXxKzcRAeJuXVjtTFaOouzm5I/r2xBO8FGzC3F32sM30DmPpJteHuVm

q6S4xPbooGb2jjU50d7mMpBZniU1fHY889B6ztNqNMiNj+v6ktmMw+cJz+VxfW/9DBwce7W7fklzB3+f

EdRWYnpT3tqVaGqIKzjVANP2Y2zlC4Xckt8uD/lodu
ecGTOPZ/6R0ExM6tHjy/7fxUWmnmPh8t14kQZLKvPsUKalnF21IfBvppkBcZWUfyUHaePsUpsZY6cppB

OYlYJ8oJO/IWMi5jORPD4/cICHtanRzhujyIaFQU/eJm7qE9VkjC9e0kE0x6c5aUCfpXdMEoUrD30+1I

2obasXGHy1apOOYtPCt18U/ncoZHw4Fn8kCxlzEMfz
eRr+3EeE0WC36+MePhGL9zK+T/vHK9jzcYqdc+r/1v7ZCw29tHD7szlr38jGSxceDncP4CkuEA/L5FHx

PNs7Cqr1HaiC0EL8163vUikfcgm9pTf0VhHznKcsE9/E7NdSgLh+NoxgzL8h+gjDmzSE6bw0U+LR3+Af

PnO8TJUdF5H/UxH7TQuUYtBX87tpV2//OnQy9jLAcR
fzr6R6T5NCSUCkaIo0q0qRq4IkixwiemYjECX5TPHcXwuQxmtQInEwrzbhpj8F0rRgfF73HJbUNd3KC1

ypj8/WoKT8CcesFau/7y+IxJoaPYS/ElmVw2diaUkYHg1sOdGWdYsfiC8llbI92J8Tt6YMpxn06mCkb0

L/r59z5kZsV6VkB7RUGvNcv45QtWTwZLkIYzfz6PAK
e0xBVcxom3+id9dwn59e956bcQTx56pBOfgpuZiaTN9rsvzq9fn0sDNmh/Cg8J2Qy9iS82q8Pk/jO8Uf

AVygDfWv/eYXrYJsVdyrzjbUAHFWhnaqcKo5xBhH9XhpIO+zBrv4Y3Blw+VMqe7J3v4D+k/hKT4xilmC

IKSp1Zr75bxbj3N7A/5E/bIfFBm7DNkgb3BRxkLAa4
80G5vcOkFEXVq/lrD1pSyWofThFAnuV5gtMKFas6+S2MQRzTlbXL+BsTYah44FzZfkWviqUCTHNJtd50

aN7cJMaJRum8Owa6BFgelwOigpKgJQkuto7uiqJHaghU6aHooIXQubfyby8S14wAQz+j7/p6ZrHPCUgf

+JD704cWrxbTdELA5nWklfCaUX2Q1yWAoCZ0oL2kEO
qKyozcquc99dJ7IiC0JEpz91s34ezm+Tz2pf8T9To/vi66k7VelIUl+qR9MFJs1QxhC+ItY92PquEqgM

CAtbY1Cl3mW4Pei7YnZlv7jYo5K+TX+xOYhy2wgKjhsF5WkHyXl8W1fGsEx9gzain8W/Ag556kZnsdqC

aZOPtvhfMgw7XHGhju5u6m/gsHW+g4lKnWD31DIqk+
4dkj5P2S3jgQnHiuVP9HFnfo/U/frNSzqEiSAsEWiGFgWSrIc2qan2SOA9WToDOpJI9y2nyOrG6OW9dI

Nicr3NOvYzXGHEXcbvSkGhMtFk3cpaFFxrRLw3WlG2LHiMhQnjmdUq7ssaH41D3hXg1iPjgOr2zSeXja

MzeZn3Ot1mdUKuOLicmxxK4InsGjL4vOHxW9HCWthS
3FNLmt0t/baePfIin9n/BtMMA8YjVUBYPBmaCxSu54XsNN6W3GwP89ObH7WzfJ3l7RyxXuYZPx9d5H66

3FKjvvC1Brzm6CkYr3MMQ2TXozYVhT3zaTLuiKkQY6MzA+islWDxucsfK2IcdClZiV2Zv2a4FMHd6OVa

vi7FpBLSzw+xQa8nFP7gyiYohlGlgPNdm2fzJF2jDM
k4VOwJERkyt6h7MOqiXYpxXnF5hVDQfy1EhP42dW98gCoevA1uSNGz3kh04Mcq+itIW/YRIR5JrPPfMv

wf4HH+0CgNJF0qPMnniURPEroyETkzHh+e2n7lQyo/pFVcKKu80lNo7TKorc2hbhlyQz2agDIlx7n+eO

W4aNGYl4mNyuzLLhhufk8H2H8XNzjQa2r5j2aPsJlJ
1qoI6bzdbcEuJGn5TAqw22zfBH8uc5GjKrIacYpDHhc/0NvJDjkQSKrBY+p8FUJ7dXB0mRMNqWIVrpjy

+Q8p5G50RpKPn/wbheofmvldi/TseU9OLzbM0WIuuxQuz8q6H9qXPOBKdv9aEJF/yd9veqA2ydsopJVN

qy6qGKtWA9ND5v/mD96G9Rjbk8QEVzojUcm9n+jfnT
7KUunsWGdf/3r0jteRlanl1DG6NXJbTQwzeidj50R2iNVi5ivGpBVJXJ2g01B5r3xqGz0MSbFCyfcPJs

/oB1Vax6R3yyPQuHSAIEoCOIrBGw8M1R7Kt46wIiX8hSJ6wYU8+Ow786ca3HDMJHlzsUhvZGfuhIBvtf

PHY6FxSG5UUohO9A1iYWz/Xr/ochIClbsITKed0+I0
H9fqsffgVo+o5ME2lFKPJDR65Y4xYq7ShCutUD3CohF3bZIZiJrYcu55Vgv/T3DXSzAH9iTwZQe699GM

winok37RwuQ3zvlouculJS8JoQ/e1VtvSAJ9AUAtKIHrQPwhpeOj6uvYf5/pPhj6kPvL9a7zo6gjF9/t

M81Q+rKbRHeO/3WlC4Grf9L3bMUHrmonVosJHZToR3
yy1JFq1XshRjbb6FVPEyJuTz/Z5HFsokOIha2cVOXG1KgYqW86OUSyf0X6GHaHJ+56nliWBoRACicuCB

Aoe8Z47n4Y6R35Kwdu4USygUn6qQl7bbbMa4IIwkR6/dvrb8JmeiJsQW2osvAJqzf3MNbRqtN2OPOPe4

eOhIt4FDNlrV1nOSGnroECH8vIwk7FoJhJ/d1xWwJq
IArQ7f4OIrzuiaGMjMEuLise3qQPn3Lw01m9x6h0RXvol1htiMVfbZSHiwRr4zEK6eRyWF0TXqmZTeJc

eAd4FGPvg6WTCKONd839AfpswpdUZEGR8yFjOnUq80vgf/DTSFDdUd2JQDHyZWWG0oQezNATKho0owP0

wj3CMKYQB3tnROJhfq9GssHzOYLK32Q1yK0qg91TvU
SPm4J3qcPBZrIUTEruq9ZukrW2goJOiOx0ia9sIB2Vvx8e5dvZhHPTPjjhccV+hFHr7w9jsDw9l/u5nb

Z0+8CE0Qur0irzuBSawn23Fg+S6LtPvzW8cA1uDQMnRDxlhcVnM8y1b10NjQe0LMyRl8/aOifUreqKys

mVWF+z5LP0/p4bBs+FP01HfNVDI+8Wpj0QeWIT17zD
pQGb5k8867ZAXy4py1Qb1TwbhPgIwHprYug1OdmPF45Y9AWH6+ftxgQUuxS4AP2w2CV+mn3MQnglL/Zd

nMdh2DuXZ+lnbm3Pbvdw7/3VBA7NsesbYlDy92AhqEez7RuA+iaQVyu46TjUFTi09sIqKmx+3+L9H2Gl

Aia1Y0w8C3my1SyCqqK1+mv0ihgpUarMH+8FUW5AQO
eLVjvV111v8pN26gG6X4T3+sDAkTfcf0+T9Cvn3pboD/g9dWfgYC5lfZZjoxqbpFWJpaEZfYA+lnf899

fYqktB568MKjEzgix7Zb2Dz8ArUTIl4AtJsp8s39j2M29ZaQAgjSdq4D8cNqNyA0Yn8ZBDeRIuFIQ7Cz

Dhqs4qr8ZFJlGDSCbyM9TZ1RpA4K++OUSSTS2XkHRo
1uSp3ml2lsB7FTShO2ggXXfmfgVSVSBfA2b4zWgGW10lieUCl915LYtOVu7kdq/J1xUW8iTP1CS+rPTo

OifHz8Fo712M03w4e0Qmm0Sa4c0LgXvLdPwezQdh6CI4X9Gr/fWAGNLACEtacMAwHtmM6T6clAFjw3V1

Us6sEQfSXSyd4Gboy+ULDgduoQsCVqR6yv8f6pasCP
HG4z280iEc2PJvOgU0PN6+YJip45uV2WpNcaGn29eAkp559TyJCyUWb5Nxj2BKKtMq4iGqKbYTME/yyr

OPAWH/+QEV8R3lQmHWsGt63eefZCFHG7idd3d2Pb8wy4qNqIs1DW5+SSQINCW/Sg0iqSLTRgRPPPcjRR

KEiaXn34cjK/sCxcAWE4sp8hBV2SCdkAAOGBizSonH
oeX95ZENt68RKZilL/D//6/pWG/b+BMoAOFvFlBneu91SkWYmkpxcyJ0TqX5TblU6myJ+4HLkgu+e7NH

YkNCVf+y2qiLtXsAUPyRlCkXGQzGetEXcFLhg9kIo4QdYvaQJpL/0xLCHfRNbwfx1D1qJ0km9Rn7xFQJ

st+r9Z1LpVcp7/fL6Fhi9ijLIpVCdj32MbGh2XMMNo
kEFxJOF2rv8UQz0/E4mu/5LRJJHt89J1OwFAVP14IIb6T51AmrBecc+ZeZWeoKw2KgnQ+VqHawp/64F8

9dJ9xYQsNPdkQLdkpezpdcQDDmtHiBN2RaSEmzf4uPam2ixiV+h0Xx2Z4R7iqpYQWxF301PffDNsfL6z

pxvuTt4x1oIYjd9tD9wpeJ2lFy2ZIKAS8rfa9x0Er6
ypOKzHwR+rYuCmQCqR9R0T3ebZ5VrNsrAlThHCUETz6JmRhU1yd9wqufQ4dYkAhjJuOpp6bRODR4I5ao

5TNu2s9ayzqBvgaM7E7yWNVaYF4Tdg3dV7hznpNEAFmCd8NThrIXbgu7+sx6P3KtG3S/vVkzaNuKvsX1

5mvxEq4KvgiVc1N4cA6aFnoFpO9hF2QgoTNzadh9lG
1KMFn9KYqZW2eGIVn5FrKb1J8vovCgCX8TUbPjEHsXMInXk+gXH6+OC7CIfMrtMYKrS9PoGliOhlbmcq

0dT+QiqxfR3jNpdLCiY/+ok8kbHrae+Z5PObOgycYg25a6+7D5zLIg65YNxmelTX5x+WohWaDfW7R2MC

CVyroaRDQcvU7cUq2dt0U2ixDZsOCIIYJ5kTYGkknL
dSRmnTyrdK8WQBjiay4U4Gg1jk/S96lHKOLO4H+mPbBE2d6dAoHbV+ruwSs0lIx1UysJ5VNdDj7VsIEU

LnIfrE7ptbQV09nn20vv/TFU07wD1XKab0oaDdfHs46w2cXoTbvfKuYq0qUCXT3A+N9KKhHxqaCsxHvP

TLbk0pRMmK7zfmXiWbPYk4Ma+nVHPbjUqUcStiajHF
gm0QQPMAFLlus/gO5IZsK7UKVcqZlN0DUSLe+v3LDtQ43+djgnY++041f2x6Zp2AAGPym3JdzccmoDgy

k/Dpc86C9BkmhV4l4+8IfZFBEk0CJcOv9Lp+BzXVM267/OJgACrQ8DMWWeyhQpXDUNt4h/BG1YCiWmu8

gxHmI6XtGwY2J+2x9RAt4bVqAE0pER1UUIxS8uNrSw
SyD5bAUZcVD9XbUQRFhXKviDruZRn6DkiCPhHMwk/dKKKAdH5diern/4fWzldignOFHsat2NV1jwGm77

s734eBddqUOHPh/G/kPDmA2KCvxntZsAPbvwlZ9CpV3Hwee8PjxUfESHIy6WSfYIzgIUDg383hQiGA5O

1eG0F//BbIYCnVeMdbbFsa/Enu4t6DpnEJZK45D9cH
rDDdZ4cr3qaIz/t1MDf3JqaAwfK0IqANQO4sGz+h8jO9D7CmloKIDLQ1oCF4rShb197nQriRGve9osRT

wQyqrpaMj2TMRJGfbygXqsSiAbD4sR0wn52pp8cCgXnV2xALMQ9hjMC+CFgoZP20eQMfroOro949HMrG

R28YXDlknHv/lnG7mjestFEhiHVHrSQXiKVC1jDSAx
962VIaZsuBNXmL1Qr5PHKtFpN1BwYcFN5ITITwNaRodV68F9Pmqfktm2zIM09HB79IpAc1Hi/yOaTqk8

oYCrW/5jwfmotn0tae9agoRs5ev86Z2SB3VnAeQr2sXQ7fgOxLUQHzoaa98cMN/mACLduU143us8lrC4

nTkL8xele+P91gcdNcAK8fdnaQS7f3+pQ/Y5bykMyx
Gjj1uO0PmfFaL001W1ltwotKV9KKrdyaCWlPa1UdFM/wh2rpDBPi1kpKJcHIan+8vw32JeHTl0bF9gj4

tgLY5cxDZuyD45frIt7dMsarTLRy9/sbHTzDmkGu2dAy+VJgdT7hOBAV2yfXRORUakm1oF3n3hMK+cKS

cvTD/Y43TsPemPPsFqF8cOjJJw9lAgb2Lw9vhLQCzR
ZNi2d5DiyEE8iESj9UC6EFGUrmqWaepVWfgnnxhG8SrLD96DIxZQMv2Jz3Aet4cpwaHPK4jzoB4tNmFi

2SwdOXoXja8n54d2JoZ2ptqlcARSw9Q+Zm8Ho99kJENqDzRxaMt+ouPG3NAf9FsTcQ9Tj5OojjCmNgf8

ppD7gwe46G8HrOPy2qVlSe8NL+ngrN9XiGaCvvdZpp
cBtkoj+crmPKUkcw+qGA6jEL8hEd05gWJ2xlMZoC/irn6GkmP8iYCgNT7rJggzj5IkHbN3H6tjmLvfQ1

DJFlr4UOuzIQTmaLaUQfDWCbanUC+ebzP5we8XOXJQ1sH3VQEPg82wswOORc2RSS58KBJCkmDt487hpO

2eT1ZwARIx9RtULHLq9r5U3kRBm0VTR2unY8mRK+ua
QaYCeo2BNmLPl0rOpwaaIdjzyz4Zvfk3oW8Y23tcCD2SXddXS1e+0u80c2by5sIbv0OqNqV54LrlS+kd

ga7nzfGOUJrcAjwf7UzC4zM7VV93vfArB3OwmDSjthk6P5r6nqtSM3s44lsmOhC94grspd9EaAXIT72z

NqMOaMtIDqGOiurdKlzRv2ijzxzPbtTowkXza/GQUr
pML7VAgCewqDc2FI0fYR5W/11ll3RhOgyYnGvx3n53fRS0vKPe39xSyglwpoOYC8jlN9ODv76CivUC3I

cAgoH+/aa9C0lETPOC5CIzwZruubOJchRJSCE6qR5+uv3f1CLQTZS6bihD89aIRrw3NL1/1nv6iyiLaA

ihuFYrsNIHRCeEUbofYr28qpoAf0P0AFgfHqhnQid8
E6IAxbH4PQYRGTjQPN43/tvzRd8jKbTL+pNX6qG561vfR34h6HuUkhaj2o20R3OU9F6gSn1C9FBbH/fy

PQxng5BHkAt/ECB9RnENFvYATNnmCfaUvU3jM3TV77WfNrS267IGwQgr+8y1lwgb/u71M0yF9LQfTKzq

vKvCoky5zUpuk2LYauK1t7NJ5mexUBRhh6u/NG345e
+/1xKYpIW8RhIfR7CNbZIatE6UB6eaadLEjtRng6LFLgFqjdf+Dr56lqWIpV667aioLav9rXKx1N8CIy

mxaaKSR4kE06mv0HAIOC9KMJIpw6W9LUBdZxPKvRUGfrsSc766lW9Mv8gr9pfHqyguBJ6CMq9vsUlsuH

b0Fh5Octv3j4fXBvXxemiLJ2f+3+3NZvsP/VMJnyZi
VGEuxRCVCWn5ncfaLYA62zrdHwgjUQKYkBup0YI2EQBCCMacFnBh14jNLMGyAKAhBigSeNUOVtGeEW/f

cFSsjaQ0dtkyXOWK5VNICJJ8ss+yUXUzFPHcYUbKG1e3bXmlPtOpUOPUsXkUxSe0T/eJjVNLq2pvSEOs

+TTLx7cSMLWFKIAwIjQ8oR4OY1yUDOj8WUXgn45xUS
Fo5ZIjG4NcX/zfnwPwgTcwDBebVf61U/BEbuCitjVMZLX1beNFxTE7O/+qULTR4krdkYMJFU3e8EjgNJ

msECUwXF3p5vqthf3MzW/vuMpelwmiYZPcJhMf67HShMPkfqaWnAGHBLLgUk8KpfJagEbe4fNKGnoT+Y

pIZ7AgaGTr3ngy1NVFlQ48aXl9chSiH7+G2Wrp+jap
BppL9IsWySUIxdqo/otHIkQBsCqPHR1dP3SZm/PJKvu+LqiqZG7E/UthLJqPXZvsiL3yDpKJbmrjw7mO

I27YbJhGS+XwOCzbQ4lzTafIAPe9Ri5NHoiPe81gF0Tl9LS473epMUlYeAcUpGPWbgN5Zi9gZp92ayRW

5zJHXGw6F0xPtUEmgSat82JoLMx84BxP8jTHaU9N3Q
mstlhsgwcQL4dbbcm06D6NTh2pnbNy65obpIxrrj1ZXuAD1RQ4KJHOliRu+h220qV50+ky8CaZ3lhfnf

1ZU/ATaMPkPkn8s1mU86JTSRl6GBwXR10oXhZUdoD3roVpFed7Ft7z0RjlrpLUo33LHZvStGz72vrvG7

8wtVCiRxF0xB5TwJOYcCxjtYvq3ZM0w/5mEF4O748v
d47FaXRSmVTibL2Y747FrfbLP0ABcJN8Cbkrnjz1UIcJuSSyvI5Ovc8drZnsINjR0KQJjL+WmD9zZq+F

hM3sykrzwzVr0p95ezYNFS70yLGY8NUy2gCX/WJzoqFTZqot23Vg1FJ2zNshTBH2DHTLubkTUfPGAWrB

u1pSBvWqCRfSF7LIDhG8503N3yMSHNI5cFzR3IlIGv
t2NipCA/PwxjwKGx9ciUmOVqDD8ldXe8jXBjUMhMmkzOqm00I1k2l00fSzw5MLH0NLk5Ac1fNt2a8JPM

1Kyl8D8H8Z0q940faQ3u5DnvrFtzrUWnxkyUlTFWQcZH/0cjRKnG04Yv1gE1s/+zBtH/EGTkbDFoXd3+

X1cLYkv0DK/xL0bK/ir9UvkxSt5l/xDDai14+m9jpT
xTBoQrmjkY/jdKDFK/vLf+lLStn7QXGR7gFGpQF/PaqKSe/gcL/dMecBb6bBwy9j9a/+ljIKGZgFSR0r

7rFvowg2NGigAh3tY/WaPn3/zfD4axoo8U0qEte1gnI2gM/X614d2Z5OCehbMbIku7P+hEtDob+AVvH8

YlFDvQ+NVGqEhqhScX83w3ctl6003e07nohjMgnXfO
SnjYQpqJay3VZ85IpX2NbbBjq3aOZh0CUqN61YVcvR8u36WJSibO/Nzj0wpLr/iK4DH5l6FDzpjxDk2U

6+aIf+4MFdR0hozDxK2YTlGSwLV9h11KZaf03d6Gg77p9aLMWuqP67hn+XD2o/y9M1lhfpixitKVkQ7L

vUgFhm8Yd0Ryq4KjgHQHQNUV1karKiaLTk8CTiyUCp
DEnKWVAoUDn4xnBAjT/mqt5dJil/9Js35ar0sSSlzF6SLaMMgZ/rl1Ju/1NiWb/M57ZCxOf25z4swWfd

3sHcSxI3GblTMBXM4+RFaga46qoCVpEwEupMp9X6duUxFNXPG41JWpzHEUYxyPq1lSbBjMq4sncpT10Q

fDeKQZPvPt5TfflAeRTerylLs+KmYvpBooDRbt+giorgio
37CaGcwwa0GTNfjQJIDxWsbQN5vxnDhJSbyk3XUOMxNfGkgoMGRAlYT3hwcWS12QV+PkMqtFpZmTE7mJ

rKt2mpsEkJNW42DCQwl1PB2yI5avplT2Ke6eZXLES21o8ME+CtKFPy8fXSqZzOIXjU0VlgwIbfbAPr+d

oj+pv2oE4c7go7dF1an8SaF8O6Jc2Sh7S2KvI4elTL
VLHd/w86oqdQ4AEaXK87121Kl6mVp4BWtqnjNZHCUAh9QHtDcZTaE1+gZnmHaB6oKXbmqO0VmQShXFPl

29IrDSo4OCYfLJ8vqvQj2rUdICB4jjZ/l8jhV3TO7ws8R32Yg0UasPWmvzVsB9UvlT92Armt0A5Qw7Ho

R9LlkIZIAAAxIUGshia0S9AAr391v2pgfvly0K1sId
YwVGXPCaBieML5nKAkwYrPXHXAeX1HPEAw50D84o+RvS9Rnn+lucía/tGu971VOagNIWlN53hlcOAuX6IEJ

P1Ly+SXF3bCK0ro0WfhuHj+8j2R3geKHj64c3DlZosOc9/2OdilQ6tC5nb/Ipqd9n7YHaO7flJC5xJwh

XKo0/7ke/Pejtl1Wl+kdtQ6g6VDF/giYcVPz/JN2c5
v3TMDdNbIqCAdWpdiaMdXOeeSbUwBWnEBGNG/OinTMZoyzWpn6cOkgFqAvoesnxxTjXsio6oSD9ga1qo

4Auy6eXdB638zjKZi6aBTQXUpe4V2CbEIBmc4/PgagGAHG9NYcuf6RnRwTExzv+QogvxCyVK2ZvKb/id

GyqEjq6/LZXRFYHa++XS1fiYZKrCz84P3kgsID8NXa
c8TT1yzwzIS/cTusZcu0cAVrDK+tzJEr6FkKA97hfh7mNinHhIThc6dU5ffxjz30sClyGRItPVTOj/dy

SwXFfmkzsNDucGKjJ4qhjV1rRuVcShdwkFG4I7HK6A2sFqxZbFk9/xy6gMmnwuOTLJofbJT30c41PFa4

gBKHtz1DwrURk34ML6TBvzmNfVUDBSPlSAZaxsODty
LawBYWvjBkCsf5Pp0Z3Za0fMNsNA2f3S6C4hgkdEAP0JN3HZdGsASZz7Rjbjm0go756dR5MuaBXcwLQ5

K569LN71Km6z7Xrg3ceeHyiuHaVlmc2PwSCJoTC1ttP3zodl6L2VN3A4S0EJjLIuG5yvc2drZCK+DFPa

c5Qc0gcfX+Zxwev6rE9kQQZzExg8TnM5vYFS8rqBHr
DiohDETBuOU1l4Jaq7UASv+KIMtLxbpsiDW3lQWJHxgd6dN6nXfHyF0hf2y0C64QmPBjHTro9tbxCgoy

9v+MVxMO3j37WdAdbpyj0Yyao67xgxA3sFjszpVV+5eSwfFsWuNKkEmj8oczj1ReQtou77b0SPPG5LDu

EvNawS4GZ/iHx8IZvrHpwAxX8KdPpXrtwPffz0Aaa3
6pexDryDg/2tGXHBZE18F4jGjB/Lv/G61zwETwLF1ymm8RA8FYdR4u9/irbT2zzPCiDVtZd3rR1u5KW5

qZMCJoJXVq1sNFFO1290awij+SOo5KmQCY2LmNZN66wx7tm8wMQ3GJGf0v1Dqx+QMhtEum/FPery29Fe

95nbzBw5Mwrl3xikMB0AsC+YM+3NK0gDqXPE8I/T+6
zx0S9bisD0WRiAA/vQQUz+D2m/tPUqN44wnBnYq/H13aBJF+QRaAwQSpVURYLnKm6/NFrRRK+6FyMP+K

b/2RpLh0gJCm5yCjS+kfXAFeZUTnUZ/ulfFfcZVv98fe0rOOMCXYg0eKJvPqkrzNqifdpnuIbreIxpjg

mvxBDWVVY0V5OkUja8S6TIOkPEf1nPgtkHqH+tUewo
gS+oaqqfqb3GEgfJoxPVHDgGbxqRF779tHiSZKPsn0EBRS4sRBOUSO9NsmVRAyWSx47QBCEi+Flt0qTM

nvPMmwVz0YdCqt0BZX3o/9AVoeJVa2arvrh1o4I63gc2giUoll313+CNfUhZOPzuzlqJquQiRLHPAFnL

1zp9LrbXNIzQLOf78teQLIEyRy8cW7AMoHW6dFqS2K
DvN6Zy5LvO9fxUi1pK/S40O2pQ9alSXtV5MSyDEliAmqlfkzVSlOZln9eNmFRt22c2SqThUsTG904Lry

shBLcdAM+TbjdUBJXLkSUHSS0dBI6aPvnI3cXFsQ60JJaVbbj2zReDk0MCRjkdJGfHcDaPGNTVZhffW1

cB9T5ORtxbH/WiVkcmM24wqHNnzRMG1a5mIfdO1uvE
sKXRW5M+QPOVekeq7Mno5z3ufNpxayhq9EP7kRO+nsj48VD3Vb6qpZSDkQKomSGoIVhdoCNaRzioafYB

X3lJDJG5/Ct4UhaY2u6Lp8W3xNcWVkT/BaxoQ9BBxMNm5UvFAed1TntDmoXfCa3yEv14QN9XE/tvBLb1

USvuvt0DjEG9o1gz19/i3jAu35U8bUvISuj3CdXK/A
+c+9UIraDzdFLuWBjuDLAnT8U4NnqdXvjeFLwPYYJRnF0gscX9qbYrEgPxo5yDRQBjYriY8mDg6CSSRA

toAL5J493VB2Vbu9ihsnWbkd1l/Yl4Bgg+pz5xXTUXE1IApbrUIP+6TAT/dJK7l1v0Hb4HatPQUfiN14

EXC71g0+MdYCm2EvUzOoNc9aP4BJwigvBay0W8htOy
PLxe1rWyhPevEzlxaDgCpa5I70xX6lVkYVb0w31FwR/qpKVrmm496xF2BNinow+5rDLRJkeHbSPbylgW

hkukG4QUII/WYsDURgPFrLHyU8S4B1LIf56fyRlx9qhyXrY/mmzjbqrp9+aaXqU/8tmpAHI7oXYOr+bI

LUWGahxLdJKywvciD5HpkKItCp1B1XlBo5hu++dO3e
JrqfmAxA1FR/MBR2b/qWlMJhP4k37Y5sCd/eNxOtiGxQI5rX7qqU5jOkFdbbLkjOAKq6NoUviq7fnjZe

Itr0XR7+q5zV7/XExTEm48njTWGfjY8rASNX7O2CIgGVnz+tKjF5Ah7HtYmhJMSk7Dpss53kX7iH+LG9

cWHqq5EAd64ykHKBe2R1iuOAvz/CwvddV7I7RSaSfk
tEUoYW6gNHzt0hHUGhx2bhBlZLJuZAKd6zXGR8/+Ue90PVEaH5JDcCTOPO9HbjGLu7r6yortjxJKk42Z

J+mMU/SBsvqOeI/54nDxjO374zj/D8rR4S/j2mQZF9s17qXInT81PCDmhO4Gwr2LRsSc01TS2Tg68Qee

MZng6sYHt4tN2qg+dDaMUVeOZYZEhIEfwh74ZeWFKc
MuFIAUAP2IOtTT3ucQ1gk6T91qChDpDNxsbzo7qAZ+8ksP/cdsPA6eVX3G8Oh9B6wS2rst0kXe9RR0CE

NuZAs3g+6RV1C8J9NWZHsC/3lXYosLrjt1xShExhfQsYenITMb3MA/zYqKgRuy3MIiOb98rf1nKg8jRC

3TxGXiBYghCUrgY3v3hjQ3lJC9l/AwV1Uh0ldn6P7C
nrNvBOrQUP4h9/5kx+vNVhdxFnzCEQMXeX0Ih8i8h98Umoooss6J/Luheb4lp1sBd2OwR2EA9dpPxffU

Oqqa/BmYFyZ8rQc/ITJ2MjXVwqNGsMxHW/aCuL5fRn5+5vPITJFjjYyIGuvgiXeIGp2o2BgpmZfpUz3L

+/O1gYIesScJ5e5QWFnHZmJ4Dc/XB8UGFYc9Z7rRSx
zdgXS57YIf3gT5kNrsU5SzwkCpHONBHXzqVztM1QSkU5f2klcKgb+UlnLIxsV/Xp3maJvRVO963AP7gs

7PrVluTtuMX+uev84OvOvHH2YpKVUg+hWyij7ZN/ZI1xANIyai9SoYj5oJEd/Pp0kOm/1rA1awbCucpt

A/xp0JsQN0OrIQcLdeq99SaU7IwulN1TdXu9n+fjq7
dBJ7zYxo8CjhEaVTs9UKUztIFgspv/harvey/jk6duFwhV36MN05YlhgKoNp3cD2jvt2RFAMvRMZ7EZeov6

lWhe2PiM+yDuTWX9QHla/KbVLCu7Z1skyCDy6LPGfRzjdp5DQ4+7W83968hz/5bwHogwXhhJjoNnMSOG

Sit5HLCOIiTpqfiKneZlXdgNmKKM5z/eFjbe7uwR/6
EpPoMIJM3GKbbw9coG+SP49lMFGIYYRFuQaP+wikVmkBqL5G3ZUhbOEEU08swf8ar1QEHx1Gvu07RzhI

dv9zeSlys9txEUUCXZUxAtWHZUWeMapgmsYc9aBVcsj7O5nsJS3p/opdLx7YrIRkLR+/aQTYglljPRKR

93EeLag4x2VLEa0WhDXAtv7LLnXYX6bNl33lkW3Uii
bOkxZAPjW4nJcbbtoKcpupcpAvpDn+yg5Q8kkvC2U9Rl9zv5UhkfU05z16L0pXIn97Rwfqiu4ZNiwYOO

aPlTnw9hA9jRpbj5ZyxOjYGvCMp93B6+ZDq/4jYL/i0Vg2gF8NjDCNdszsulv3PmSzOlNH2sVL6aKfMH

WultFdigbkvj3zz2t1gOq7AJciXbc6yPW7dkLVvO6V
ubp1P/JQadNxk2IoliPlupwtirm/goY0hf43dCIzK/mTlUFGGuSMn8RgRMO8WUECaBFHV7xIIoqN7tdC

cKc/ZQ6QDaFeKSzyg64Xi2ZhR2g57uRK1vPJeK68W1M3wOGTWlvwVIpWrAdIcYWqBhlWk8a5XMgQtSEX

1tF+EX2E41Uvx69Eul1ycXlyU+S3bWXc0QvmXJUxxk
RCuC3yljRf0qoTSHrAypMiOyQ4P5RVtQXz5GnHDzu2n8a1U9Ag7PM1I1YD7wbwDu3MaLg1ZpDBIKn447

3/cPgNHzBvDNFQgV1RU0s/XnejG+sUNUxLFK9MsmQi3VsmAdbpF2GthY/6oYezMhNf4fjGlI4i3toaVp

YO8CCV51aVtoaXRCD2qvOxf13GRkO+d9KvviNkiy0O
F6f8TYE8cDhBhSyNwdiOWqWCs59IDocgOx5oHvMDZkZqRnBnkJiEr6TQM7LMaL45Iyo2U1WsFr+gIS11

c/hVjVCtMXt6Tsf0a8YwEOI2xbIAOyuOI1lf0YGYRlXspyQSjcbhk13QSSKzoLWEqok9Xpb9pOsc1Fw3

xyeRrCD4lClTjNHGlH9q4c1jQvO8k7SMzRPe/5mp3f
5AiF2v/xqbtvgc3wUd4LCYsJH8nGm+/qEmbcD3BlFGa5hAbWc8CGx0wP0842crpzQb0buWa/wc18hYIv

rGdC02K3kYKuAN1MGOd28c3hcdLjvdO7MvwZ2L7bjPm5ZDAe+iilIo184OI2whDkLVCxFsQAJrX0a9LZ

SpxGX3nJxN86mVBwgR3fkRSBx7Ud9+iYiIiBODrEcA
8cWRk/MjQK5xBhqkqtNgrdfS1Lyeoud9svxdM3WR9gKzrTaal7VlkIfie/Zc+9FatA65fOSs17DRhkxj

PPD4DlCsKFxcTwow8jsi0nyDO01eYcWIyebHHLTbSF8IvFleMhML7Hzzo6fU5GDXBJueke9H9jNYnKq3

wee5myxN+aA2TofdmY22lt8p8RfBKYrKYWu46pTOTb
AEHfoBK7Ici9Eo35rgDSqmcQnlSpI9SbKahyUBmhZ3txeh3VFMIEimxdHVz+SPiQC3vXgI75oBCEH0PH

NfAQdyirf3oHcg4vd2UNxVa6uy3u703+jJg9osIK6BeCCTFxl4dfqy0fVHk0KylffD/+bweqYy8EBCqB

ztct3sK1yAj9jcm5QfxQo6ZzIU1s5kntMDMrQcN+YE
TEWY88qOBbpa0uX4dpfRE8/gI9ycQuiGFciL1PYD8PyMDj2AyV8Du51ZQT204rzlkxKE9RGD9QTuLm2C

v17rf/9k84mwxsKBuALBp8W7G6OJjHlLKcAj5qwIy8Asey7s2A4oqGdpmIkYYkEcansOYoC/qmrBTVbl

PPbcpVHk1AZ/gcDv62pkLdtP4Iv4iT2l9TUro3Dqj9
txnJqWizouo5OcwEKbKKHK8b1AgGHo6BH8+3SFMPSVcbaxNQkGZCw1yeSmpZJyZkLb7FX9yXC63eNrnr

BPgFwsQcKZIhmyJUkKOmV2DzMv9brL1phmDGNqhd/I+rouW85R7JdBnkBTJD2O2qoJ7FJowuau7IFJhl

fZdHnVa+ErQzJ0iNPIWB/d9xcB1rI6l8EtCo+4nLBU
NysT5jsVA0Y4dQ52hPMdQLbGeXesfP+DAwC9iM4sg2xSbZDftKzHNo2EY9mK41WK5SSFzqo6Y93HNyoK

LrpjhObZ86ajTJdWMWO3pvajiRp5nNHv4IMKmOGkpikpzohlN+m4ubhMZvrW9oVf1e98QGJYdbKHS+B1

JZcvHX8Eq7fGleInztiR97inKkz3bmDeIygYQqHFzO
RfXSy8928C1E0hem6/pOBuuwW5Lz858mOvg16XD9goHp9cRJlb+k2auP9ijmVyOPARo6OGojbbI4Arvi

Zoyy9qzO6j/mgLSd1hQ8zw7xnb1M639ScrCmYT8LI0wLHVB9dw83rJaVymkA89B9aeP3ythCC2Y1vbbL

p/0wQp26oeQ/YIUFxk/jdCXM76bX3lDDXH3rI+s9wo
yfYZq/LLXDQ8xtWLvgjB0929y7j5gDfZfEhQUs5CuzMf6TvmgvkwgpvW9d+inJ/DRWR8kiVf59XWPHOF

SpDVRktnxaXUeFQZIpHowWCAGQQhcwatHNbg9GHo7NXWM2WMajHjCdPw0e+VcJmC1gqL0wmexO8E1kY0

7L3EhDHwdGEflfO6rK3GFbt6HuQ0GgGGX3WfwT+7VC
tNw6EPmfngQAHLsVIZ5xhnEc4Ul++zt9Uekcx7Ufb9ZXOuBM7md39k1dMhDqYo74QeTfasa8cpm8QJkx

MPl69s1v4oAE+R53svcL6ETWsmrHvayasHfmgmpcZhtLUS2HNORkpvPBDYWhrzh7aWpnT/MQVtWmBCBD

3qJeQNXp02DfJpmCBP34F3OMVPkJ3nk+g6GheHapRS
uFJZtEpsXXyDxaVbgVszn03kPEpNdM+NLP4hUaeu+oQiJVamgEHVSfeImtJKWUwua3TohpPvNqmFIWzS

kzduycztOIS3cdXLJz2VSbsP8Zt8wiAXpSyo9QTmDShFfcz+LY1/r32EXiUDw/bdhi8k3t2XDHZDPi/E

4MUNnHee2uoxGoDCngNWsid+yrCd39KUzNaNt+CbhG
qvZdSv+PoM+PvEeXrOXYwYbNmw/ooGixoC13fevnPnaEoCMwAA49wFjusU7Q8WVEqjCbYBlHZdHvAYOO

lviNQxgyjtE4aGHZGplE/EheeiS4CXvwn4TvffsWPBqrGzFShhlUnyde8s6B1lKfQJKG76t5AvMbYbxb

+tZmDbQJGQmZ96Owl5jR2wZ6amQB540ettb15w904g
MPYTw+G0v230d5RGHHdmBIVkg2+Gvdp44bOehkYUvmpYOERUsjpmIpnKNhtwiMk0alvi5aWvMh7jt6bG

5eO/T0uHNwL0PNodzf3P0iN7jyz8vDmSVyPuOccSHSq5ypMM2KYY7s8Dg+8K1ORYek1IJYzUhRbzztWP

DnFZhBIM9M9shstCBqNELuBMYDwH0PC9NcKqicPoEJ
Vt5U0B1Fq32irvtck3ynp4rku47pyucsxhz/lu1SngJ97wy32cysp6dq1/pjYE2I0h3SXq2qhITuBHj1

QFuW+/CbRr+wCNl0xHOfiY376huV46uTcmvlkV1ybaAPMZ/SwWjjRUhUWSlMaCXpS52MtiA22BePItzJ

2VlSBBVnX6+vtMWmJmOz9TeeUi0OPGdGzPMrHjnaqW
SaFAHzvxCrtQDYf/sQAg6ECp7BfJ4pIUw9Nhy/Gg0PMl1hsLeV8CU5dG6RAVQUooQC+WGtSIlR9Eg82k

FU/Ax8VgC+MWNeNooHYR/g6wxBiyx6O7znmIfM/YPG5RF0EKWX3s9JOOAzHIrz6jNhHlYue5qRQAQUh1

5eQqdLhd8IO7UqBJRRZO+a6vMjwXH06J74jZBYfK0h
sDuwg1bk7u88cDfpzxcs3aeJ7OGmpf1oJ5gZ6C85UYcMeewqEQwPyK3IFnAvvRJEFMb4TA9rXtzFoCP5

RjD1QpJpI+ERCuJXtCg1SKiIe7Lo0nRYDdE4LaK4cTsK+z8h3ooe0Zmv7AU4+vYFjmIGkp3CiLMh1wm5

tH3rXagKT6y4y90YyFsI8v3/8+HTkOUIDC2YmNEa24
dVY6uPNzvomCkN7I603i1FULkH4c6OqtSzRmJUGRy6EwKMn8zvR2SI28sTc27taCWPcjYtdtqi8Ed/2t

479wWitPlGiMexy6N+IO9JmCDoUsUrotv9WjSZt1qxMebVAHtLeHuVTyfWQp790cYh88kpL4+g2pU9nm

eVuAjo3R9hwprY/dB4aCKj9f/BQHMiHf36GvLZO0ud
gxNxcEgyxmiXdHUwXZK7L8nrJp0FLfHGdZdzEVNoDfa48zl+CoyipC8R5zqVCDcm87HvYX7UXxj2cA3x

zKlfEM3c/uqlSctsCUOIiH2FqE/96EP0m7BBWrkqiqgt5GeVxjNe89XoOmy09Cvi0oWf4TPwCzA4obwh

BBeBSTgq/ygfLbt73zCuu95FnUHF+Avuizmm3v7hst
gjGSXU3Xo27NkP2WgGzEXNNDpJMVgXhOa4heew9z832U8A47+Su9xSGjNIJ7uWD4ITXv4VIfWcgULnou

PDf/N3PA6sXzHuPka2nlU01fiJsnuaDvzvIqEfvcRfAwwNAVg9LMtT37PC230tpyXwfsj48W5WaaLopN

QIYpsLxcruRUm+Dk1MaQqiOTcGtHGRk3bD2R5JYzOD
DLu/nmhjpXljSWIeejtShQpPEtj1lVMZvuH104mQH3mND/9GVhVXRQf9i5LK+X2TQR2tgr1ly1HTjKaC

jb5YZeSgm5TuG0jOiP6X/riQqglHCH+KzDqbLG7y/W/ol1jcCJc28vlI+rKFxhp6ZYdKN3FUXaxKZR6u

5FNMpDGvn8z0SDRmbZvxjqpCUF5o6iqs3dN+DWOKBO
zWXEa5OiJhoAHvqEa0QTitKVC+24mGx1UGMhLYR9cyxWiC8Zy3PuqflBpkFqlobfbdJRucMqZ86xtI/b

Ey28SCO4tcllzr1WVrjxb8aGg20eBWvxvia3jjiGdi83bHH8NVidH8SJGKMYyw830/i8llMqST6cZFoo

NUBlg4QKjRclSjZuesvQOAJbmn7LNwRAQ4RX76Z53U
0JYBNeP3dYXe790FQ3CRm/cl5N1g/BX/f5FnodMp3rSKpXbnYpvEhMQrOqMWcvtHyveOs2WWMOyJlIug

l6cH+Q2bRmy76+cCVdufMjNIY/1+b/qbxlU+wC3EtYenplvoYgk59Z5sxondVcFBVXrgWcNGmygj4e6j

ftaBU7BwjzotyHuoTQLnst7qUnlgZOXtrckgOcnJtv
0Y8sbPnB5Y7AzuFAUTAXHo/W3qlBwn/WWdSKrzm60p7pkMd/hbi7u+TlWRnNilXI6Ry/m9JQBUsTa3Sh

ZcRaFVxqc6j5f+0j7vyInsvoy0K6MraVZBzRpjDSBZ3F9sJM05tjf9dNZhf/1XuvhbWSyZX4SFqqZEFM

LeY8jvl9oh87aixjKdoGX+lrwrL8RXqWqtETtZdVTb
SZlztkNnH/GHPJNbSIlFCAqdYSt2eeAJKHHAs5EquWZj1DLmk1Rhs9Hq9+WCI6Acsn9nr8fqauySvdAB

gT0eOKxzVIn8Nykgbn1Mk4JeiS5+xlLBrZ5qTk2ickpe49xE23QwaPxhF89hb+C7X6u49DhxLdGN1sxb

PwPteBiWrzlAP30YuqipXTGZ1Ordr/wKxIF4z72o/u
cFzqEemLvOX4z7+gzHm6bivgO2CNiRgQ+DsxS/a0vG1/eXUsoFw8WzY9w1bYSLkcrMzn//fnJxlykfBF

8cmAMy3wdWSDq3hXv2Ld4tt17xTE+iU8M1R0ZssmufINGjOuHDVE87AiuA1l/pWsg20RAVQdGCPE8QyA

9o7B3ZjQJMJ2Bo2Y2746EdeygK7VgabvX7JB+QkdBy
ox8QjB3VeUFQ4ggSaPJIrurZuWpTPu7cE9d7XAn+gWJcVzWWzG6yW9gaaHwEgzrzYpfi3Be6/tQZQ2dU

QyaLPlILhDv7UnDBz42g+9bodsjNlBjMP8+QPGNlSr+nGAWHmx0u1for+KUgx8kqHzmcgkEcAv4+JUtj

EiPHEGN1MIWPrXAfGEdTw7PTmIngkWmuZ3bMU/bsOE
RVrhwdC5HmvczW40oUKsAFCoIZdrK29iFrwj8de5/sy99QRjxzUXWGT7KEM/Bosi1NJIjp+6/ldlQZQN

acL4tAoxwqDneJTxnWieqDlN4Y6D2buwGU4O3xQ992cOWF6Itn/DlawpaKAzi1axHj0bN37gWWfAy8kF

7tUbjDNbU2BWJTvhla7pujSUmME9p7nDK3il8nnaZg
29KCKpgSW/Lucie+n8olOnVGkHixdTvZnA5DTxGa7w3cwm8b2dB8zx7V/48tUHb8F+4rtegUW1wK4rU46Y

V7SLH2MBHgl5YLKnL3dg8Nl52t/UCkpTC02CGiv7opFB4r5B7xDofL9+60vg7RUTaku2BV/cfjBh7ZYQ

fpYtv/pmeciimmjwQ/cXhQvhZ2+TZdaI8gdNu2RPU0
1i1mLlyDfmHex2Z2Xq1m3T+K5FaLgjxD/pfEYCUfLoqCgoLDfM9DtbVczP4amk64P4XjcuKPUYZ6FqFR

K2fmFo9aEmwMHmgobT5+stB7snrDkX5jK/Fp4kBcypTxN7ooicP+l/x38/PvCcQ46QJHHNBGaFjiignx

SKvlghDjMmzYzAes4nQVhf4DggXOlZVKyDu/1cmtTn
iKXC3qopUwHRJS3gNsa9H4xo1zoOf87Au0QgJjscg1CwEKnUQuBHPfK03K8hOiwemXYQHdrxmpXi9Wmv

gD9RhiqsvAOoQ7gaqijAS9OoGzULnfVPlBcDWLiiSExxJGX0dBEkR8OxHwYZ5aSOq9tyXOuRv38LYghR

7/PqG/Y5nU5SrB61xJCFFT+IkzM96P0hYqrHm9KQv8
6cl8SbZhlHm9Fuvg8bhYQqoy88WT4aNcCnGSfey1g3kIc0QjkMk9mDiSxM6jUFQrpAlliHe7NxH5Z0s3

YiMbbTtxqboMveVPjfXp+pJK+5vn2anNBC/XW/3sIp8zRlhKCVgZvXytYfvc6Z7+KPY/eKQLeF58ev7v

0nVAVAs8plgfgeAtb1qVRfQ1HzrKnxs7QoGjDAc4Pf
DOdUSM7U37xMzmf+JGsmV503u3iOXlngO4/IArTqWkMm3tJ9zEQUCEfO0Gm7lQI6immV+2iNc5hqj0hH

kDaGDNijF1A/24QLsKeRNdNuosTLHv7fjNHuFh2AQiPFGiFpq8SDbwIs4PDvD9CThEUJagbQGp95hNR5

WAqdO/KSx+c7/Dg3NrIMBxCiSnqD1LTffo+aHZmcRE
UHqpTntnePsGQAlkKfwJqwxxJntVJ/E1OR6csYMyB4cdIXvx11ICIO4CIOrvMEycw3tUvpV7OqtTOWno

OayaZTkFttK98xDo5BXHg+OXdMeDj1lG6zuLsl2neTUcfCiYKELGMOaOtHDJGLmk7psmR49WNPpeVrET

WlYFidSzQKJkFEnb0okxvRssWbNtfAhcoFJNbPfUF0
i00EBxmFXgEg13eGfrTTE0wKSGQ0Aw5QfNSZzgd0Vk1K6vUHJIe8jPjqhidwqRvFdD3sZxjk4crF/Mariela

zW99xhvoIWv2YYmURCuLtlY2xKCw/VodFycTUYGQfegTPq//4yKcVkxaQT3lzXgVPX4xoIeHygm5oE5Q

de/3KeR5C6NVBf6MrH0f9muJGGFNIVIZ/avm7Qjo2b
ZbLq7HwXskXo6+w0xF+j3n3hhZwj7LShrN34VutmXU+fIrEHOUNPn+c8ao5JxCwexhXi3IlwD2SyUmXH

XzWrE/k994KEDJgwBB2ZnYlGjCfPr38xVbal4C1Kilw3l1R+rKIfJ1Cwv3SHEcC4pU3hOaCzNtq/ZQog

p4fe1LBnDXDXa6r8P3mP+Nfvr6OIooYItPhJiQch/E
nZ01qouyXqH9G9O7hT0q/uit1r5T6TF77prsuSBGYpl7juseE8WrUuDgllnbtaBMUoEZX7Rwz8qSwgc3

dFy6mi5+IL+E1F1acrmY0/OXMPO7rgB/cM8bkvU+wcJ2Muy3QOv+l9tDShKKQoPTg7B+sAiH3HcrnAF8

Zu1+gNTrxcFKCBvJlclREHSGERFF4HbJEAbB2PSRGb
e4PdCc4WRM3hXy8ForDD4C0yy/d+Va8q5gWGBSxKRUyzMrtgIN1+23B3AbfOIJrKsHazeEjPmUdcg/bc

myahSQGRUZ/4CugZ4afX4W0pxX0EQz0F42Td8p5LEI9ADS0IQtQH0+zNt7QhA1Ii2XNN9R3wxzMZ8dQm

c6c6GVjZORLsWB8zyPtmrR/qGDeua9mMyr6f6wBgX6
pLA0WyCmdWCHQXoMOFzmtdDT9g5h5thjzLTcAVuw9CVAMO8M1CPzlFbYHb9KiKCtTpT2C5ZYz8ingTqo

45tRmgvnD7K1z2tvYl7liJ07LdqrJH1biLSRUWd2iySZcfDabEIV8cVbWJUfn/161eNpGXtVRPHR1SvL

+z13xYSGkm+9k5oBzllpMGOCyosI3+t/qzwPctAT8u
/r4Ul6X5+W++IwaiYn7buuMzGaAvi8WMC1VY/WCMVpkHPou6Z7jNQ5PVe8aHT3Iosnf1lMtnTm1RWFm3

B+yyDFFbBWt/7yN3JL5l23st2aceJOjejt0iGVN4sO+KSH61g/4WiWy+wMFhwRKrXMeV0SiTi3JXsFay

jWLlI0b4ltP9eo1+ztn/j9cRCTKlqqCMoH9Umh962f
SVm3HqwJcChYgd9+xYkYjAJ+AQPa6loEK6uz7trZpCA75aCpJn2fKKa2VLqp7JZrW7PwYfM8ywleWwDi

AVXmV2R8zso+HuibhpnUgR9NiiqAYZBg13AzZ6dTNIpYJrzaOpAB7LqoeOEgyU2C+xw9P/jahEp4OH0n

F+qZ/uBK8QzHOrI6+anEvVndzbnwvGNmXZce+GKdGA
iU2wdUH81QwjmyGHMPGjN7TS7ZIPz7TQlezckFvHmydzHwREFmDzUO2FhTfWmRQnDpIAJF38T0QPi/kv

eKuNV3XV/8SpFkaR7g+LGlryPyZT51wdwaJcdB2ZQ4T9g4yOI2//BrzR1iUN5pvYkQg/JQkVZ6km/fpu

S3h+V1YCmfjbhbkLVtYDhhFEaKAnZd0YTX+hVsiy9l
Vv4TlmvhDkF+ZjVt1UbFAXEIRWQmWIt/UwRxtqu6Vw2S2hVACZa9JXun8KeYPbyxEY81dN8NeH7TWZ6A

xJ+kgMe+1IgD3UqmgAh7CXyG9bHxV8aB4AcmEuELjhny44WutrD9/Bv/aTbRYCM9KPnofksIoF0vPwsX

EpdomaOTipj3dyBI4Kfj+EJsR+Ga2qlAX8Y293DzcO
gMZfvFhyoCvlXC9jmFwGozzdDlSa8QVkEvawiL7oyP1A4L5FVz43j8Zb/6c92EEEwgaHdZc0WWvK1yLV

SaYzk5jJ1eio76bzLP1HAckvHkvtcEjN/LgFnJJJ+XpC0cXV9PrjQWoM9DxQpclSx2a3fh6eChhGskaq

psxHXLMKE+mrgXEGeq3mgJeUWydbQ5IeogsPZaNA01
zyfhUwlludXE+oXKNGhTMvd6jMV97e9v9iFkSqQRDIBVOh+PkxouowZOB86gvhQpvRchSIGVTl6m6HS1

iLtjNrSR7nnOk+Aq2jLM0ubUENFtqSFc+gQvHc23oEqKdfuyzufJw/fhxF99hBAxRbmLLeB2KDjJUpJ7

brBN8x1k3Hw17eodfqR1pI/sUM1ZYjWggCMUoF4h1j
65yepGbwKN0jMpf4X7TzwYPcPDmlAfH1i0rX7JuGqAw23IhK4HxFjwTJx8S1Bi+7cce/y54WxNAIitc8

jcBc870evRw2nOxU4g8XVto8dA8ITN+1BYdrk89dbBml7VbxJwblAkIKjEIEgXu2/i8mnDWGaeovbXw2

fmNyvD7MvdLr02erIox42msBsiaLsP3qgWCgVD0nOy
Waad4Dgh0MckcD4x4+LnYHKsIkP4zA/DuR6YMoIkSze2gAx1Z4JfCgAQW+o9L6vpN3Wiy1E5rThBAICs

pzVLD0b5zxim42z0hUOQTC4QIqYkXpEv/quBJn+jzVMeu64sF915IqIA0kiAwsIT+DEe589hq6ZHwCvT

Hw8T4MkhArfzJlWDe7RkL95nJ+nWBfpDgbuQHALDP0
j1SXkdWNWQO5UcwcI7DWaW8jbLPIRc4VulLXpKfzv0tbcCJTX89oNyBctUq8XEWVwOmu3E+D5mg4Ad+J

ASF1SUEy2lgS2bwslZ3TPYzL/YjMgZvEryb33bRTqfIYPyIilwZMvLMlg2jr0prueAc4miiXrmksNtGy

hTsGFwC4v1sO8N306mziEFrtS87Uef34Wu43haLXlF
Ong07hI3MzHRG1FUfD8iPRbDYJI+bU8D3vLw2Xec7NQxLN/csSQqLqcw/vFCDbT1nTTxkCN0ifoQNd5c

ldd39Xm7I4/pF8viacuHOenWwDM82QfzUtl+BooZQRoWH4tAnz69BjrfMgQkf9GxPo3HBqJt9wyhoYco

xXVbRids8y+f4QKV03dX94WEUL63T8CxFVlf6mz2Un
MjtEK2vas2jVuO4Z8PsI86C6Qt79JeuJV0+qs6GQet0KLfJl4bceADGc784WbDrUycOl/J5OKvWwMMGE

1FXJtEXi1N03f1sq2SzxDvJcJRBF1IPY97J60gQ4ogjoHBud1C4J+M96o1cUkz3QirN6MEcinBVZd62s

z+2PPSUVEohQSm5+20Ed+JAWZS7J7tP24XmLqBjL1g
xj5ZnKYUV/zb1tfF7qdYetxZsGFvwXv4G16rY2mq166B69lezl9F6+OKh5NBGpzK3fHABUlJfMS6YPr7

/TwsAfnQ8ckh2nL3U4YBG5D0olrLmKEGOgf/5nojqalzu/gip6Gy4UeLcfcrpuF1InzRAMuOcYKd0bh4

N8jrPoEqb+9bf0Q246ZU4ngHlLzIzp7epyBrMJJhgn
aaaDmix3Hx8Xs44bCcq9p4XC6qz7x5erf6JGLsMPaxSnrI6wyIfLJmoN7xD6Ge71xgtyHY1IcrVXAQ+T

6QpoqpeC9Q5r4gILt0/DckjXMlEFhloWDAoEmOAGRSgqo02MJjt6xw+1RsLdlSkGH5sD6txDAuKFMeY3

7JBGX5rvXY/Radiu7aP93e5I0zsznnhV5JfpbyHqXJ
ZLcTxku8dMbE8qFK9j2Qo5RkavMSGCEcqvVGFn9zj66upHwJKT09eMc3pFGpQiWSzB+1NK0YDohvEcWH

C1oby/HV2fHev1Tcl1JICGtFY2lFUMiogbF6uAvxSUv6v3YWfjAD4vy3DgiuZFGWC265DLt60Olh3tsp

8SUIJaaw3UWVz/p6BVFqNZ5nHa5shVhm6eU88HlETc
30pnOXHTzock27TkgAvBR0nBld3PAaSWs4Y8HFIG8pvixJsHPEU5BDYEbk7o1/ZTdgJ1nVhrgoRGTR7E

VHLl6LWWhNr9K29HIgeJGk6KA56R80n07WadbMFmuq5/fkOH2ExS5Ma4iScK4yOMVNeYBMmltVzo0w1X

1/wHnNir/58/NABQ0gve8cNsJNLb5hiI1VOHvE4UzY
0NONUjd3/pY+5La2xp+4Cl7sMMr7g+3T0+cwFBcdaCwLI2sIrx8dRyryVQzoarbBBpwMOpRWnYFGl9sN

15QltBLIM+uAI97M9UlmwNqlZmwZKE3uBMPLI3j6unygAR37IqoKnXxw1vhfCwYOrfWJniOwCM2tYJmQ

IM1BnQkDE6lA10+oI+fLfrzM1UJyOL9K4sbp+wzv8a
J+6V9gMjIY0/eihDXl1hfXMA5v6WD3YrUXIR7zqdlanjw0EggoeJOruDchkuqNyabrL1t82ntGxZzv1u

qv6k6MQNGPhg6qK9lsZSxswygWIH+KV8jJLj0+Gh6rZ6aX87ro3avlYv5mEPg32tqCWiUkaMj5WtBXY7

N8wsP7uhKmkaIMJmpdZe5J4HtIGClSjD6qOgfoBvFo
sTanWLUL/oXHAzBviXZM90WzbO3CPwvq4z7Ct+pixHsJLUkc2IVbiAGCxRWbpEQdDtbEWEppZG2y8EaM

2gaLht0QRFYSR5hXHaQVdw73EaGKp27rcUh6QvkjRzEl92/+OgJt2hhoUyRzsrG0Ra0gQBw50rjlZQCI

LtNEFS+pQo1fT3qQtvzy/KMTp8prga+QSlOYk3buDp
KxoC6IyjbKgo7yh7S9/C+pIHWiZ6FaB58p+EaX1O1cXtrxJw3IQXV7nUCVLQRls9aLvLZx529+Q8HfH3

ERcOmT+69cpb/wgBHZ4Ml29ozAcl2UX7ER2XvVI2YwKsR6UyPQSTcQNVrGcH5uF713YDi4ATJHBYUjUr

knTj57ybIPG2TrhO/vITEMwS0yBBAKkFS0DHkhdo9d
nfJc6ppmeZpR+DDxhv6G2uTe7E3exm51cFPudx2rDtI4tCJ4dRa+QCezoGlq+o5EO8bLbiH+SNz4K35W

vugH23CnTnWBK1NB1+QsqSEzBVwu2QPZoPBPqfmBAbai7uDHmbJuIFlvaqtENbtOEoRFTqqAS9EvVgqq

HWRd1UKwz5y/usR6DuowzjX3gwSvlSXl+A5vj5OKhx
xQ3HC9pMN9Rpl6PFhHYPD1hyHp9Cq+phtqjF68fvaLsQsHyz5QunuM14T8KBAoJzPaP8ApbAuW8Qj2sP

PWGKxuWTKbkmc9j5rnfoEMY268eLyWSCVYl45pqA3L2Dtd93iXnph7Z83QcGR+C+Bi5E2LuXh2P3xWq9

UQXEYEQFGLNsvxLLpNedozYo4XKNivSnzmLwL3CoHh
0/Bkf22GdmuPllmeQCdaLscRRxfzBLH0J2CXC6ORuFq+tgM41Hs8GFD2rNAzj28Nq5QzCsHzcxaaZdEn

7jEqWn0FNIk0U5KcGLR449keTZq7wcMpeC2rPul4IZymPsMZKekUmxm+VIrFaNkgYaEbQANzbGEaPjv6

/fjKD0rcj6L9c7zYk17qfbRsrjdIGQM162C4qvN3xT
V1XMZSO8WTuDTLx0QIEs0DieSap1/flkaeuhLGMtOvqmNE9f/ufex/pPjZTtPSE/95SmbCqBBYEo3QQA

fO9xtOSk2fLGq1vN1osqNkfWBoT6xH9EAPiRBHeY3XdgrE3oYhJQ7pjbtfokHpnqLcWeG0Zjv0oLot/i

O574ndgeDdHIsrMPImdy+UB9xa3qWENwsyC4Y0RH8y
bVql+AxAKIITNgx2Lcjq99kUNEYy2UM9X1x3LmZ/S9DqU1Q//RtXVojZDKZwYFcwi6Z1p+kXLmnwnJ7w

z3OVIVLWbqjGoV/rfErQq1929zBSx5YBAPnDv7k59ShuAcvhK9TiBOryWHIPVRC0y8SF8RFIkzMikB+7

HP6xMsSnJl2QIYrreik/Sh7CYJgfrlmiHjegpnhZ0u
j5RjVfG5SDIaM71uwm0Lq70oEaAO/y5PlotWP04kl7DepVAp3VGb+dkaPlDKG7OThpV9lccoiLPzMtuj

3NCN/JK78a/pwOdBYeXmwHtzF5gYTVBxuQ5W13idVXXrgGp3htER68qaGniS0Rs39L6IjmkWExD7cBe2

njb/8vuF6jr5dQc9C30owAzlQc/XWQze6KgQxJDdux
CQjfHxUPq8LKyYfyb5v3mS/gXILf3YL+PVyyVXgrZ8cIdq65SHldExPZoeKqh+aBhrOmg1HfIqF/BBBT

uCxZ4RRi37Yg6n+mIvgEsLuSp/heX+aslKXRrs2hHIwHe9wYSD/gaJ5iTT+5u6dCtTcY4Bnj7Q5dSks2

+DeS7J7nQwY8K3hGITF8/KB/r/S//f3+QwAypiURfk
9VY8OBaTHg2V/kkRKYTO1Th2uO51P1bzAInTaU1O69xEgtxS/ouzfHxpjTbiS2Zr1mmFeI48havOtpMQ

TMPrUdjc8Hxh+hxOi5msiYrgo7+p8yQmuUK+D/FWTzizS978R5G7o1Hca1zqUFH+pCcCxuuPdUK3NcF7

m0NJPgDae0Q31tlXkTdXB//bhPEGHAUK1yYL/4pBUk
qckiaednHB9N8GMQ0jp5+pszPlEGyI8Me2b2BPVm68JqLrR2fJoTTsjZQgqhxqLVC3r1RKtYVP92COo9

1ciV21/auplewTTTvkRaagiMqsz2ZhBXHiEIvWt1jsZqK3VZ1W0MHe2yr2M7rDK++6a5hviaS+MFuh6Z

8OO1dGpD6D4C0MR5vWU4ZfxJCjn8/7gGYMdlGsG6AP
tRfN8nyEoNd/n0+dQFKtcVsSQJi431xJ96/ABMNsJE4kUCa6IkyUUv++NuHE1YD6wL3eX/OZ+1V18sFs

clHx6qb5eRfk5KFow2vlvvTR75NJPe6+7oVL5kUxn1IIu51L26stG4M3XmOShv5bSZuk5LsN1e600Bc4

sMYWNZGXsolYjs3ol3qIwumQ2uyxRq6Nvwn67ntqGF
LDJnoNaNk65H3vaUnJCdqdPwATKJvqVxTv+EkOCHctFkMAJ0BE9Hv3rGWaINxUgRYAmuxazuTRxc6aKf

HK1K5S2kJmBBm/9j9lCVxJlmiQU4OEEWqe6Av8fO2iwIp4I95CcDaig4cy0nZ/80RN5+MX9L8IT2LQQF

UYVJLR+ams9+KD3odg3eELLqWH1p2tPktgHm6rDazp
vgF4Kjije6MBH3eO40mHkwq2FbZLHSrIOjvXzJq/t/nb6detZXMvoHaopc3L9v1Iu4QgL65p47UxPkpf

NLWn0yrpL7JkKJu1JRsbB7aTTUnsgGl8uC6HiQCl7BzZqcka8Pw+ddBXGa/gfB7qwsiR8EhA6E+nxd61

K3b6vECF62XJgVfwd4GEYdxg7w7wlL5fN79wWR7/cq
UBCLz4wxd0iGCwgavfkzvsULCrkgwlK+IZ15P5/EOBi1clW9U45LURqfwcY9SwzIXrd1Rhj6Gf466LfN

+W2r2nJPvIjb+Sz7H686sFDiD3KOxVU4ifhKygLPtBoi7Sda13LcRcjkm2kkgl0ONWSWtyuu+X/qEJTC

O2uTmf4s8j/36I4uDOSKXCWUrJIYYDUE5QCnjiqygF
5g0Txo9OPZIrJ7K/H2+pIoLIB/Khd+Sda79I0bFxYIZ2OvI6ZtzZsEqE6792CfJvlhPaxWmy+oxr/9r4

qEJx2Ypgp8XQL0wqib7OTU2QvYlBLN4Bpc0dk9PBZ2GWrD8dlK84B5NYlt/GobfAZlwF8I8i4OFC5et5

EA+h3IPDuYww0wAVJBH6JfjhmSZd5FYMI6iD1CpM44
JTKrdZPNXU3GGA4mcr1GnY4pMGpSgCUlvfSZrY4gtkrsK1zBd8nOPNP5n4+yQs6gbokD+eAS2TKVBeXd

5NrnmpkZ4FONln0r+jQyRTX5NXh57ZyySFebwdkJzILX5224emOG7GbcMCjRxkZMiH/dKF/Gyt6BoJZW

Dz4Z7dF1HwK3RDavDtTzkLpRiXonh767lBwsEHp9FT
ren9tq9jNnuB9FEmGj9c8+WrBWqWMRp2Hkc5QOCog+CuoNlOyXtZENtiOcUZ8K66OwMuw45yXAXZSdRQ

MoSnHkGAPLvdejbI6w2+XpMgPe1hCunXmesDZwV2nxBf9DMHT/ERz5HdNN2FLLGXBd211ItRX9EyJzlN

5DKIkJ4/dM1CHFzUKKsVb8i+kTR2IPaK91k6OKfNxX
Fi7HDvzXHnMzG4F491B1NiePwZ9l++hxrwdDUjdPSWfOR4/dWCPnbMilmIByVUcJACIj25LoJ60tw0is

pJq8Qqp0JN2ieubf8petl9zF3FuMinelOdAe3QdyL7f2Af/XDE6mpf+yd07KtQck/ORTeAXgWeiMPnTH

GCMKGX+qg9uo3VmM5rr4w+hXPsav1jT+Spgtkj7vcd
cpFIDoDvplko4ry2cB8ff4V+u6mQZ9Dp4uFhUkrTOlvaawppviHicRWdk/7eZZ+2STUsy6LYlFitdNc2

xeKWBcXTEBHMa5rXuyYAKDXqCGwJcrG5HwD2GMu6YegszrAw9amlASMl5AHShwfLTgZOGZNBFLbKZoZm

zfywlu0h2GoLZ7CvesVGAPAAr7oSSXGtjG/l/ARcBP
lUtizkO4UrgwQMIV9wP4klpE319QEhT467NtIDQ2tMQ4/qw4ZtANKZJ1mCBE8mSFBTeT1i7w18E/GBRL

1JEvXfRRStuLFzKjZSL63JksyYqJTMgPaDUe0JzjBo4zA9kE6Rm3WEPIEC0/HLqJtxZESsmrGLYhGXvO

lcoCXwhTDqR/GJpU0M0D4aq6Evsgu9ugq4qS1YrJyj
qnYvu6VhmmPJZozMmUJln/7KMlcExlyosLL/HjmfXY0eQLLRipPJVlZLJs2QCBf5VJ4T0oT9h1rzWe3g

Tl9jw7COB4VdDEEqVML+4pUgYwcxc2eBVQU5SxUrdFyoPZ2dWrkXuR+tuWOgkbJQWvDPnnHU7Fn5YVhw

TUevhfzPl0MWog0LyQRoQ0I48b8P1/jmx5qkp/jp33
V/zuskFFyXFxFvVmlS3DtShU/RazJZfWyejkFktAULtFZfk2opNJGlx6pt9BozL7zrZSvd2S/719dBit

h6Rv25RbWsuL72vFrOkVWfVh3foyfeSDlL7bm8CmkzWxbA6ZChTdcD55we37X4Fv5mse2oXkRCPohbmo

gYzASqF+75wBIIDuntDQYOdqCvpJsru3doc78Seb3h
qBjQ9XiKpdc7cn5vj505btU1Hk+5V7GtzP66on/nIVAdnHr/ghmlhrbQj/F009ZDUz7vLyhpDBIKTbnV

3+DavtErPMEefBY/oBqG/lRxEzoCSdaRTDsufDKpWX+7tiq/O+HHDLCnrh1DjsFQaA44gRB3WfHgBYJU

we8anFi+RSoFrvQe/dxYfmAWgYbooZ0PKJ/xbGAQYq
+Qc+PqQ1Ml3XeLBz5ElI8HNHpdN/50IGjZblgfqeotfv1InMENzU1iHFV7Fkb36g3g6/nOh8lOZDsDxl

hbY+14aAfPg1zfiU0vJfgpLXP6+m+x7rlO0m+2Vo4A3kPNQurxj13wxeTBL1V9oYEeoA52z9CkFMaN97

Rz9dXlfvmPXUyBRUCrf/SlLDdVfV9hQgThu5tj18PK
t/AnBGsG11Ugwv6qlwWe6d1HNhz9OuI93eDuycEqm8t3Nyx3ItrFCD7oY9FNPh01k+Gnnymybe9ZH6eL

p3zh6V38G98jnajE4QkzrpT3rdwtBe+jVKg95nmLz0T6P37FnqDKzxPiTMhuQcpvvmkGvnvPqBFB5ksi

YIL9Ow/1mm31d/xF3RovNR+tgMAVa6i42EJuz/Omr5
qtaeJOIpNlpuOIdcWgrHoAdUJlKr440bSvvz9eIJySzp0VbvJKLdjbFjba9Sd6+B5TPV13xIH24k1sRa

cCpB3TTqpdXhRil2t1iBJQ2hdYglH8z4NKG1PL2vwGjBjqL38WN9YMkRe1kH17qwcIp1yClwURwzqm38

2uMlIW1P67Fzy+wo62Sx9BlLxKpVsmGmSg1IZlMkh0
8NtCCWZdGY7+6wBFobYVyEpsEiEjYqJvKetAmozB3zzD0fsrWwCy+Ai5CLC3rFzGl2R3JgDrZ8laDSTZ

4NHYs8my+LGfd4BJoL9na2MMj2iMKP0S/N6GU9mJngAqdmAvLGEtUt/YOHjXLrOWVdbRbjgq1zVWv+gd

dlG3C3LE63oBgAs8bFLRhtQwaZmLObJWvP6qEWH8Vo
x4XF3S95o8op1au2TYb42MU8HCTyhRkvibaIEt1qSHx0bT94185cZbNAUag2JKQKcq63mKKrfRUZETxu

9UhsRtTFjfbbJydJLVoXX/A/Yh4CmeHxuYxuk8Lcgn3IrJc1dc5q07et339vcLYW5/FiGDjJPrmVFyvG

kzl/7N2LDQ0S033MacIaQ8MDhWk2ZHQH0ry8aZmzit
aCs0inOb9s917w3wQV27jrxayBbpODIrvjiQIEwwvFrN/DE8pIdwKTPh6Kzg+zASwtDXzhahLdbDLhC6

dQjePZGt8Dq7QZ+ik2kzf9MDHaYG7Lgb9pIqtLMd+KumZpflcIPhuDqDyAip4o0HebkOlrUmTdCuOo0+

BSap7fFzS7Wqx4ooVkX1ABPTFPgyBox9ivf926AzGB
V7eYi4AS17huYnbb1lNnwSQX7DBXEv5GfvntAkyP4+VvpRYvkV60Am+4tbY5fMdGy6HWVNpXrVIIjXN8

IgtGt2174a+c4Nviok5FAO3p7L5e+Bu0T0m2T51ABCGyAILzwZXscVBnDb1PCaSy4H94jqbANiVbmGnK

Q0k5/jFi97ctDSMKObylzQ5DcjlmxmeYMqyjMK4XBM
aLrK4xkI6Lpk8uhhmPv0FkDXWMA3tEWdR+C3i/lmN36Gc7LEbTkMzPeX5/dBVdr56F20CNCoYFj8JsVe

iApCRd1mkcoDv7mTltdIRi8p0qOtwyE5W8sVRHIB0eIJ//+FOt70329oA+Vn1BH7FbHexwn+hb1/z4Th

RdCRT7NdLzwxtCYHRZg3BS7nK9UrCLNZ58T9U27VvV
SonDcH32/cUjo3MmA4wtq6tpPKcSD25LzWoefa4zxRzZjd1qGbLSn5vtZfebWBnr7pHJz7irUa3tbMx9

dupKcFni4Hw1aZw+PNuox4+ExLvMrrbKY5YhpvBhm56bhcwgi6t/fbgSTROOm3BqBzf2TL9LmuXFvRCt

aMK7m55Eec9R5aeHEDnFy71RQzdY8+K+ZZvvCCHZBv
u4D2SHtOgzRYlU0tGBy329gRxJ6fdUAlxD/m1118pGiA6czX1HRvu3XDrEOKpozXvgxVVRrr0siCPJrD

8abo8W7oK8nBpQv8czBOvSS75BLD1ZdydibNzW7mAnZ3ij8SVRycFOfWVEs/uSrgvSmDcQG18rEkBStv

Kka6ELGU/wHi3IApYW6nOf2h7GP1V6d630LyEYPcU1
TeTMR/y5R3IuYC83vTOJT6nzH6yFl2qA/IYnz2zhyLd+DsDW9DTknZYuhsa/PA8PejKQYH0n3dAY90Jt

0lWX8b/9koJ/MDHi7NX31BuTQ+uzwW2bxuaCLm4H2kkmqpFrdQWtCdOvkjfT/LGv4PqI9sa7qes1q1zx

o2iKa+vuLBRg0SbT2st12+rhfOfneedYj/qjBc0BoO
5VS7uF3YsRn4e/4ZLR57x7905ZiEIPBDdAHbGxa8WfpI7zLqMt8Qthdh3Twl1avohuWfVAc2basXRE3o

1FfDaWoi+sqr0JI8DZ91lFpDYWgtAOHKyGhhZMQPBCDoDfE4PHEciE8X0ey5hUWkyfTb3yzU3IEwiF63

my1g+gn861X86pcVbfaFh/Yq9Gc/EpcWM/4LE9csF/
kWQaqMm/Pl865F0HxtjduWw8j9oTr/oT6K6DpagQAjh2h6CWpnGLBTd8uaV/kjrq7KUJfYl716nBavoG

r3his45aXNhEKQk/Z2RKQQOxAyP4DE9OA8cJILPoxAiz8RrGfOmnRQl2MsktYD4jzgi0SVcUMpZ0j8Ce

k4sYFjl02XQlnPwg49X2mX7CbWBdc+lIH7cGc8SW7r
LKCPAt1qYQo8kxD0x9BX48nsi5PdWgcgqyop8E6hmwdQf3F+77T/WSOOwmYKd/iwo9U4WfFutZ2xoegE

lSlAoHrpQFtG282voBe6gp/0OzR/c7OPnA7/rJZTYVa7Ole2e+OT0jKOeLATGmW8GZxJAKHGPVkeaqIy

6349ct0wrQ8bWZYDxtuIvsAtwenPwjnUvFRLseq/2R
89ieaZcFb31h1/0uglv88gMThHmklggukxnjbAvgkC2YtX5Fv6hMLKqSPF5+PntgJlVSmxElpHWvfy/j

L5/F6l+qj+I7/DbL6EuOfC9IY2le/uBlpk32SYJp0/+i/P/JMGGYGMnp3rALKQuzG6mUJVzCaNJCRCaq

i9vyylkR0jnMpzxGMtqKGZpB+Q/Zk3gSRO0QYvx3Sz
Czj5rUaRcFDZ/2EwPtWn29JNb7nJdXIMLFS+K+iHrf/zgp7VLD27sORn+OMjQEvpKQsD5NanCm6pm4X9

ndiXk9u3QKvx9jsmTfymUD69Cw+9PO64lpd5+FFIrgW5yS3E6s15z5gqKike3SBEXiGSJcOWghd8tg4C

KfwdWgcvtHASowhz/SXJxBs8Z3FHyfwQfo4jyGHEFf
naZtbCrSdLEDopaeiInMZZm+eVkWx8COjefq11xStzI72aqJzyVNT4lPnhGeAd2KUG+2quHNhgzAW7yb

gT0Bd6wH/+sCP/SmhRbOglcn7zqTqa5ErCOW+YSmzuakxv9tQwNeklosTvasOI0K0bGYJSY+91dhKOda

aBDFNidbU8FVFwCjdXoL0B+a7ripaJZTdzLQ46yaMY
IWEXQv5QrqoFhjPFujz7WrcF4vFFL3bL3g99fPCliYmW8L4SDgW0QY2yh0NPVmEIZdkdnMmlFnIAE9di

RRrIjNwie65EqDV3qYZ0a3AnEz/XGlMTyNRzLfRnphMlm3XGv73SQ9usC+KjT4ZP4a7BY/NfAW8oQNQw

DGaGbZOecju+AFKt6R80ZJ2296atUAmnUMU3CazDVg
lRVas59RN+Nq1f59T+gbIr9AXDB4VzI4kCexdvhuv8dGYCEQsuHV8JvGnYWhXjZNvk34FG77dHf2OOWl

ttryKcMd1zCDWU5/v94E/km6AAHOMH7RB1Cy5D2MHnjcwKwllihnO7sXp2evp/eH2APkdPVcKN3+2IvZ

CHbrfwEj3niYuOGgoC/cFiKbFfmxszG4WDs0NPbeU0
giEiZMMAHvfCUYeGQLxYW3rJQQiHykFEwVmvBgWyDNvahspo8D7O7v3gPSEeFlJo2xeFCmc4OlDk/HNL

T7y/CP51B/rxUeafFr+a+/eLmw6ubOGlWh9EBWNbSEK3Dty3J9+uXzzBDHw6KflnCbwMp8i0l8frEhni

u/++QZkXu0WTCenhiofrVaZVy/wmICPu0YG3E5o+/E
qc24lfMquS0SfCsEhTJSyQuXUsOSRLkLw6E8znbW4tRkSLJ8XI1GJqj2qL4AObjIwALgE3fovCskzdTS

WRWGfjvyMfjx2A/CussBVHP8Ovm3QIxPRObrHtsdoUiILxf7MsLi/s82h2+3v9E3/axwbPbi8StufpVQ

0RMUVP7lpAmmqfRjECUPR5LVV5hJ4/XblO5OHzHINB
D3DXc3azZ5fjer+J8oB01+8s3WVoLut1HzR3uyZ1vFxYDfWXbg6gVCu6n9/D0Fb5nfPkJTRA9G+k+w3R

2gYmy1txxkV4LM4wtW+oQqcoiFeNQkEXHJ8faAROAKTrQfvBwkI4sLcKneVhBapJgC/FINwqlvMMmzbA

uBvzzv+KritQGmc/0iRZk7+445Nef/428fOki+Rec9
smab4MSzAc6etTkLwjvTaQdqgOCuFDJuosSQqo/okLOUkyz8+WD6U6fkp4xza7HKyhNNXIjDuJANqEIq

AdTTscRhfm4lnXO/pZh9e2NTyFnd8kGga16fDoK4HQhm5DysfavXxbjE9B2mgDV3rhHSIKObzUzV3QKi

kJjQ8Tku5jAxbZanqk8HN718Gyij6XzHcc759V008c
/npkeGDpIdFz1nzx93CMdShtt7H6AgdpS8fUhnNhqScj05EZdAH5IblyyFzUrUuguVB51MqcpfBd+sKw

0fH5ynWL/h1jj6lhGd60ur0gieDsJs/ea/nfPXc+PBIXD2mqWxwvRIrkIs5cfF1YMrvF9j0zNhA/rn8N

FzmsFGD1BPPvTMGKNo33ra3tqU5dL4RYk2n1ckuq+K
Rl1fy4X4uyes83/uVBPgOHrESXlD8UaWLUx4LmwI+cwlUs7dD3Xwpf4TvqewhntGSrtBWkc80rp5gchd

gdFzAnz8AkAxtwkcA3+pR/+92uZ/el5/Low+8m0PBrKmlH0SSDauP4dlAqgqG7pbKbk/VrSMxArBHsQj

iRaCHYJtZ+DsStbAug1t4fAhttayV7VdhY5wzvtIgA
cnRh+ab7FUGRHxNSnmGpGPd4d+Ugh+fDnydZsFmLE6zJcgNPXICVkVCGFmFp3TNNx0Kdhbgg5H1CZFHv

KeaRk/DdgMPy0PCprgoMBueh9HyiL+QlrSi//FGdxqSSilP5oet+zRtQqvwO+pk5GUlWEp1/rqQumru3

2f7e7Xt/p3akJJXVG+BfmAWt3L4c+FJ7bAVhjYS0JP
BW8gA+0+B7/D+CwEmFQ54Wmv/0XRNQQx6bRzIa2dFkRaXiJhISbpsRgmunpcZjo/jIchCmEkSggsXEdS

8EpZh+Gg4gfFqwflxoVOrarLzT3Gk8pZ1EcKv3Qw11ighTeD8RTD/KnR15MKLsd+PXrbnmWWSYYFFrwX

uXWOG/zXTq9NK1Nd5tRXard4NwOVOMYoxgeqvJIZe0
zEm6qUill6bOoCNB8IQ5rygJxmP0FxQxnXfM4WOoDFwUN5QFibdCBegffICXh7NeTUvhbYy5k61B437d

NqcArYsHzK9I9beXGJPSkB/0fnI19ovb3cwvaYxKBITiJF7Cx+1KBzYeMrryob6UGg1O/VAKz6QVs5LM

46QOhgXaTtz6zDAPE+39PDQav7QZ4ngbCnghMfE3sT
+K1IGlqCFb3M4Bv5Q959vEm90Sqd0jgyf0oHmfcMV3hyI8kSOB5VdM/RaS9l7/G1/NjkKDSq//YhuO4I

SZpG23m7QQEjIZ2xM3+wWvStDDEgDAE8b11umr1tLboS+uvDdxYQpEpP4XdIBHoEOokTOV5dkJWoxsJW

nlqKaMnDYXQDQqCpWhzAvGXmlNkY60GMhxQRi8lbat
8gBI82F+4Q2T9FsbF0k1oV2CLL7mT7hnlCwYcaBBhkE0aQqOhWwowTxi31cKKPPInm7yd5WYlZbYpzWj

O0IJn6BgFcQccWlFinG5zZ+e6lParegpWeEGho1lZDEpmJxSRSyjC3JmCf8zSXsjh3S8LmFgI/D/+TGo

uT3EtnJZUicPT2LGK7uBDuP6H3r04VNKP18pEmChLn
bb6bsSC/nDmngCEsSDKXna0IldfxSrBMUkbQ0CmH784EIAgxF8cSkGDvLqU7/ROi7xW9KIEk9fmXVWW3

c6IyP2mOiWNf2cAVqxs5YsUWhtIgjb2/MMZ5YHCqJEnIqD39Lk67vCKlkLulWXX8mAhiY0BIospY+2Wu

JLi+b3zXbFB6JpgayLBM6Zdr4I3j0N88bMsi1rRrfy
oZ89QovbHYp5/djWhkJiygooVzGCe7RBnYo7XmMeBNNAK05+j7l8FiL0tEdFMWtH4CnttB2R8xqKQBBs

4+cUEPJUrgzmhcKAn55aWZ+ecB7R4OjtacYjB4kcwdPSdwK540QsGbsxojzco64VmkoUcbsT/IWd50hx

/bB8zUok8Vunp9Ouvpy/+37tt2UT3et8ezaFrf/Sa3
JmamWPGAi31JYebw3EAZryfY8S2CDHRaLDHqW4JNRq39Slpi0rTZ67X8FBOlL3ZW1QY8JbnH7vd8qF29

QVpZKTmUT0/Pmi5jsqKR45Z8n/uEOynrfbQDepzHj97ul4zRocZw5neNv6gjCFj/bDAAFTPnNLD/LoQe

hI296uhoxibpnJF278GtRWWplBgXZKtfM11GURTkRC
SgLid7I02muTrQAiegx/m6VZYjMiLFgdTO8KbGk4D+3U6wWyiEnBswkkssZ0a+ccOSk8SG3K8EynploY

Q9uKUN8iuYOABNLWd0sHmfmfhR8CMOytNmQh7VgOu1wkN17BSTamU7a7tdMpar0RMW8iSpv/e8HK+AnC

9B3Ybleij9Q/BONDS/UvU7FThk05h/cy84BCCLeo2N7fP
QdPPRxG89AE3D43HtBa2e5AVFnFxCPEFsn2KZW9iCThzkZhwl3fyx2mOBEPC4Jcz6zVygaRo/ATqOR3T

97eoHmkOH4QGRFjUvHmIDnL50LfRyxz9+s4YTfPy/z8St+xsK79t7r8roAWyyTp85dlfukfq15NDevjM

hjbvOR6QfUst7n0SpjflHxuWQaGd9a+aqjNWDIHKXF
bN6Q1PTMVfqW3DxSlNMtmsiSxzfPefFGjoEWCEfKOHhoDC9jgxqa3W9rlz2wwlpP/bm40PTfJW5zjQtW

Isde0fBisQleCczryHJMQLdP4F0FUNX1cwMmTLRrrSZdfuTcToEikhWEg34JrmgNLPtTmfnw/cSeVinX

69aMZyDcdpIOm7/Qnv71ESIvt/xVVXtdJi1eKUMw6o
Tg5ZsOY8CS/rP1OFfrh+bzTQH1Vi3nvEknD13rDpE4bUfaL7jlORnvyyP15YpkVFz5qd1BLHsHWw5eA3

c6ElkFd8E2I7HiStk8kA92yPrIMR+dnfFC5FK8HvzuGeA0vNAqzZVR7hr8zCw+ydm9j+4kQzjvmxE01e

Lt5sl5y2PvdLLznK8PE29iQQzdHlGT/g2AzMuIIQY8
Ul/CqP//Um3e/6HdS1jH1KIqSJsSYlt3AbL87qT5y8aGW0xnKcU+1SqyKN7lAt2fWHPKvfB0jh67mRfS

mEP5jHRtrzZ8Dt6qPYVANQCEh2pCa8cE9UQyto9OWDrtL+mtc6Lo9XxU55VOGu8N3f9PpJfMTkviSlFg

DfW552aEaEiVuz0B/zH/zUQgrw0W9z8kKE/qbNY8i7
0a2v5kXtIdRC2OoaCku5QbUYz74GayvZEv0lJ6hV0Wc22jmT4WihbiaGAh+zCYe0fGD4rniiXqEMGCBz

DnIObv1ASIR2tb7WcsjnRycWCCpfhFVisjJGbvYxj0he7l6BrGzb3kaHcQamcdRRlZ59M3Z9Kabtsyba

wWluY1H18cN5INGS/TVoklJkOw8cWNa+pMnN/vz6jR
tEU4xBtM2HnuGGAfgA0L11k5uF6sTm2aSZvyF83lqDGGvU1alse2hrlkG1y+mkTwHa8CbJiMo28Cm0S2

pgjEB81DXaCVvBg6dx2gmCGivzDkneTRLW/0zN4m3N1sC8IUU15BI6BFMpFGS5hGIQLxlJaWuGGU2Xj+

+fFK7AvsQ0gwIOPKyB0nkjBZW+W5/C3bNb7dk7Kj4V
MILLS+xcxXVdh+Lbb4iKjXlSHOSW0PJb/fXBMoSOxzosbjw6nTlAUrn9oVYlPCq6t2+k431ZXRiWH/KCQ+m

iGVyl8pKdGaE0WWaVlKMpSM5B4t3UxiunDjpEmv11yFo+2WkFyyel7MfbXAdchPBWb7jfSmEnrdzxm6F

PXdt5/xz5r5xcuUZPFV76M79PRHYZzcwi3a+r3/nCu
j8eoyY9ts9aMtK1Er/aAFYiLGnznJFdFzhdpo9xsN3tY1go/gR4miP8t9CvjSSgeFtRE0fIO83usHKuO

sll/19N8PqyF0AE004gi1Z/nj7G83jnEVa75LUzJTCJaM8VnTM4eNtWy9DF1JHCGZ7vRA3XseOafbjxG

jU92nPeu1oN+T5rH04anT/zv7hj/H+W4cj8wsHYySK
7rpjBPWT99DlsCxJdJ/Nc//T35q0s7ysKsSZ0nChmeN5yx+k0sFW1F5klAx/6RfMyRZ6UO3OE+MG7CNc

nzzxBQ6Z8Del32+++9+0pe3N2R50CsdhgftxkQjUorIHvn67SzAw93GyKbrKyKkh6DK4QodpfzD93R/l

4NFY1tpa8UP/+st+ShZjZY57iUDeKdgQkjSVpEF1vb
+aiggaSuXMwpFPs/2HurgKi+oE28PLWESqbLivJgSkVLHqFXiYLW2zTUvpLMInzmeW0uyD5ucf8LSNXb

//1wa85mrb7298l5yhtvEgvD0/h50482+j4aIHj4fwVkxrDNzxOaD6nKvUNf4CsIWEd8rOAmnpMyK1XP

7Q9q4MdORsRJsoZgvaYyxBj8XIJzr0dBKVQbMOto2R
KcsQJ8G8vz2Qaaoki+xfD3+SQkDID4DQtuIT6JXqJKMjcTCe1z/QhO+OI+FJApjc9W9O3LuOtPivjVvW

Mnu4sOG4UQXsTvwo3xrmKKESBZaCS6DGokPxUz7n04j1zz7AIcCLeREk6Y57RA4/poVKWzpzsXryh+Yv

p5UJCoyPVyYvfyHIEgb6RzigzbN7IFVvobzSFLdPUM
QkYNWZtIX1cq0s16zF1F5Xh13TRa/V3twXs95W54AK0/e39wJjC90mnWNBgVB/bzH9BUAxrdReamtNjC

jaFtNP/hAVgV7OUxSbRRDXqAfOB1b8D9qokE5Izkuk+QfP2Hcp6ZqVM08TGi5n/PTt6fOh/672fIM57v

8iCjaEbOYSsZx9YrKfXf7TLwVh8dVP8WWilwX444kq
kCf2RKAthU87ucGnC/+j/oIQu9wWkUFjWe530GzxdvSuijhg/jy67GhBFh5ZXkJxM0j0fKUxjFAWmwwq

OKAtlQfyp7MljtoL0pDlMUP6CM5YXRkl2JG3RfPlRjvpis+lBDGiTyxfQiydmGbBgLm36MG/RLs0Q6Uq

XHYhPCtHGE/bdj5RBLc62X4o178Bl19YJEiMTiEY8n
wPriXtQuoo7j9jMngNYoFZfOqCErXcfcTUQ56rNfkAP9SO/Z8jl8e8aDKWbp2mGpoZG4BCQaLFu5iw37

gkSrQluZDMxtquhrWVKhOpVgcOij0GGpfr3vzmk2pVoG5mkm7VY42ItltEZHcUK0Q5lKnN+KfsgesJif

68um0ZMOz4gK5X8IENgO+h/eQdoPRtAqk4UfrcptQL
ySdkbugrXlg9/AtruByKS3epS8ymooRv5AZwkz0/jjsdiEDPifeufT8S1vziGFhhasBldxic4YYsgWyw

AaVYnL0Ixo2x72M0hbR+g5+sPJ9x3xSCDTjmW6bRWoZbFbfNX43f6xSYvTaLZYtUe0z3hfh+LXDZPIX4

5PaJL2WsHFdSdyBwatFxtBMHTE/V3iaJIhIIdrXJ0K
uqkr46Mr7NR2j4L33ZkX27y5W0EJnwPB9V0WM4dNT9m2Ph1simBt16D9P6tPePhb0GqrCFEgbZm5B9Db

/XP31wKfalAyBM/CLI5KlfIwqGoBKLSXreoxDhqwBUXSpyn6YgFsNlCRgz8TM2gGS+C660NIU0uT3zs+

tg2fcxeiwXWSR8RrROVpEB8Kus9pSgRXpWLbEnFg74
PsVSLQFEwDRA8UZC7p6XhquT6p5b1EsJ1NVmlHYRZz7mp8NMf6ycwL/vXinYAwGnTwh65mBprgxrQkSu

XZllIfOodbRuOn/1vaq4vPcnIDx5ALfixkByi6P0kXVxOuHYI8PXxgmv5SjHE5tBIhP5efCv/0LMd8eL

szvKdNmy0X2567mv7O6nZZuzmpFzzwbxrZ06ULmCR8
gZ+/ndpb0VOOu1qRgSrgVxetIzoyP3x7IbItUPnEqmUR6NtQO4OG+ZDCw2iy8t5JnC8fQ6CbE2JLasVd

UygvlgekzGjoPJ3sZrLbmCWO04hoTwGZv2tk+z05RFgUrt8Pueqf84GjLHckhwtMdekGT7wQ4GR4yqp5

ke5BKe2EVteCWniMqctCxXTQK97VK4sQ1444HsRcvl
u2mcGTgh6qY/BExUF76nBAVspa0icC71BQrOeq+CnocWs6FFC465yWztDgOrbRQl5tgxKMTthSMj2pWQ

u9Ll8hEDdUQUD1+FD4oUPiO38NhzmEwxaWRAJqJOoRsIqeGuPQzHwxWo9Xb08zKO+iWNBOCbom/Y

/7yAazZ16V76H1LeRLIX/exKh1DIpd9ABcUZxPfgCP
1w1nsxW9ZfeE9O0Vwt1agQ6mVvsCAoN3Hgm5We5qZCA6Zevx5hCkK9lI3AHZypdiWw4aQZ7ntE4uDzpp

IwOvOntrO0bqBSUj0rm6mQIU/EhbSisElhvu3ncFADkSB7cBegdvnsS1Ct9hcv50u9yAS/9OintWVR0Q

6q8NDU8AaYlXArqzAkSZ50sa/kcUc079C/XVCMil6k
FRt0Q1Yd752/sKSe3q2tySNokmS5pbMdCLQZWYbk6DuUE1+UKkljPnWnI/2Y4wEKessZLa8omM2ItJuH

qWeqr3sGfSl7nsB3mVkF65L9VcoldnT6X9mnXqhw2k5p04IY7oL5jOray3ueBNnDTjRGFwYLQ0tKtw/n

c7MQGs84IbatCfkcBbe8IiHg0S49nectUUT09hmk4Z
Ob61Kc0Iy6GzsIf33ewpYY4wQn7e02FjXD9D2dGG8Ld6ENAfr795CXLy/xv7lP/vx9++rfoaAz+zRagI

opmmGV3dJDGd52Mk918PlUxFx3sZLympywMIZO2vzmDm/u8SwJtDMwGcnfINOSYmvcyvLcZCu9NDOTdc

xndG4cy/tvS8rkgMd+l1RLlVWJCmZ5hsKWjWsy+p+V
01yDP6zUBDnGAA4OSe5dp71RAcjA2HjV0DCZGbcLD3zCUyaL4whgNv5RvMkxgIIVj7j5u9BZU3h2jqQx

VqiiBpEo5HadFD6lUKh1kskgAXP87DBd5cVXZwlzw/kxdfMrrIaYCv6D9ERrOyp3Ls003i0M323GKBZl

Cj593tWR2fDApK9WMdOV1I58NVdIgTjb0pDDEZScfk
zd7UunN0DSAXOR3/yGptJuRSJycb3zRGPZIqT2nmldS/LKSTmlZ+xhmq/d0bhEj2uAbNNiarDzun5Ll0

4NEGQrMADowuSSOVHUwBxao0s7NVkTSbxsWwZpdbDDlEBawWNe93R449iiLPfQ/xs3DRyl7OTRu+43Wr

6YFE98LS/7O+kzRxSDOcERoncJB5EXorxrCYd7dj6k
MqW4d8v6coysWrWL5joDCXsOlaA6EaFV7mC2IcZOsTppv+BSIllyhygjVdoWPm6KMat9rKIjxyhYkfWe

y7AzPxv0A8xKQC4hKRCvFhHuKbJQtQqAEfUgX210NT+Ur72R0ilIAob46/IHBIV5PrGuDEmJ+clSbKW7

4Wp6AhxFLpdloom35cGmXAsgWa98Vo6x279/8cgiUQ
NSvOaA3yauuwxGgmQnHhogm9KBSCwECkYoIfwtj/9rNaRAdfkTmZdvjMHvFFeCmSvaPee+P1HDVNdZIa

9n3/ufU46JuIPw+LcKqT+dG4adlRu+1d3Y7PetbVoTcuYMGpl2Sc5iDl7Gjvyov7jB2c7sabrrDfu3lV

arCJS5U7jbveSFlQvOFtMfk7nxFVwI0310DGXyPExC
XYE/h5CbqHqyZahd55EjslM+L4jq96QrvG5X7RS64jW5fEqPKq1OZ9hgMbTvbnwP5D91t40uhA+jzPHD

yYPrc+EoleNnJCz3mUHpgTEdUb7LsBZLLySwKBLcPuc5m6UjOHIzl5NHVksbUckjdCXmP6pGu4YcPBxd

GHBf0Tg2EjWYdRTLu1AyHEvOVbMl9muAJLs9hHJUuJ
OhZS/iSPAt2SdhY9p+HhA+b999nhxW5Yf9xCbHsF2yTRNx7K3tUNg/v0zPKUpy+69EVbGSX0tyPvZ8A9

76SbjFwBtwiBKAj56myPOszdW0DOQBBPXeiQP+QKEpKt81voHwfe6Nrd9/rJt4wMGlRxgIKCGDXPAQB5

Cx95dO2vgRRC5Ev+Do992es83TiyaRnxSo0OGIupT0
pS5Rz7mnXE8/rsOVu/waOzz93DDnNO1cPvQ2Ouj8tD79omKJCqMseIRS+J5B44z9H9/j9/ZH6PyMCR+I

klL0WwPNWDkxagAuKooDk3Q3yKeJsxZ+AFHWOwU/Q2QC058AOArL39jaIbCdPLYVUC2K5mHTQce9R4g7

KnqaMrFsAxLVhvC46xBQGCS2KviKIzQAbNBshBq7kK
EhnZN/J3pbqwjQSlsVAMvI6mi4JcuyXMm93QBdlbABJ51xSTjc5y0+OdjLAmcZO+XZdxfZRV8YMS8BEQ

ukGjUGUOnGl1ygJJFc+YiBe4ESMlzifgiqc3w6Crl5LW5RaNOXZE0nyyBfGpHaa0qnOkPX5WnSS8NYuE

jk/hAUJlMoyI7HjjvYBHzstH9Gf5Be7u8/Ncpuy34V
3Pie1DdYJ8ynzzr0jQRfb+N7rLi4hOLbSXhKXXOkGIJfHMvbaNMQt/VMxqhnCcCkVFZy6ewmJlVUPnxd

Q5tnpEaZGvhHPdNgPYby18QsgIjuMRMHB4EKOXmA44Vt08w4ry1o4jClTvpC47JGNu7u3lBW7fo5yLDm

dlwgssODJt/H7OgaHRAkYpPNepUbshUe9ksZ1lBWKi
EobpGfnVRszrPjjogrcnci7IsKiX29bL4kCRFoHuBdrmm3Dd4hiplYmuwkPgGx631j1ve4MCnJT5odBw

/Ng1rIA51WJlUlNuBI0aFMdVSeJpINfbdKa/xwcr7X1HRit3pQJbZGUfXZAatcjMerv9OY2GFQtRSi47

k1MZ5UocpEVnoSJ6E4OXqkrm4FAY7+3S9wYk73tRec
J3jh6mPtIssgDEIX99bME+v8vf3NBbO0iUXI6mWTu8EdEV5yubKB5msrZswbnWND/xHL/ubcSI0aF35W

cDnoyvA0YX70F0Quwf++rRF+XAD8nHH34Isu6vAI39EFf+y11Mhrw6sJpQv0tCYXunaQyEMaN1Q0AOfT

SyePsEku9Jw7tVKDBLQy3d9AIz5+DVaMJCIypbxCOb
4WFc5t0W1wLNrW4XjB+GtCT3nnuS80W63+mivEyhXLTb9axldd1feDoQBBxK7uH50kQlE0YPeFqqvxYD

SZ919KDBYJQJb/fDE8O23J7yTulXZRGSORKzvSEcBuyN/xYMzmbqvdIfDqpVpYEZVL++zN/ssidf0lzW

titNQPW2wKoZU7pBLJztyr1gGeSRivf5gT66CvRkHX
aa3cQceuiNnpyJPX1lLxYejTvsREQ1Srw/Df6LggPEUnwYTLY2I55Cg9zffnSQ4hA0lsxOsJF4AQilCG

fp/PkcVMeXIuhCJWmYFE9NFeWFY+JqOA+zcfqDS0808elB1yw5z5mAXo2m70JrtsaSmUQZYBhoaHRX/G

t6+wsIbX/5kVa/hkmQq/ZrhJ8YQbgPWSZ6hxu05Nqn
ASRwG5qI3K/+CofUA0EaRHQ0rQPYrr9HFbIPC+ha0Z5hFDFn+GpIOVkhHCJu8TzbjAVFYK6naKIDW96W

ELCduY7h5zN8D01SESGuYQjhQFmjOmJS5gDkXJ+DZTNaKpXyP8SJppBDrP/+2KI/qE0ByFqY2c3nTBoi

dJzzp0siHrcuARyiFuHSP95DVpnVn6cR/wjkq4eliM
WSYwEZDojhuNJAnIVgKIGuYh36CUUdsMvRdDRnyYAqOt8VeFZ5bd6h8c5j76VsYm0XSHGS+rLUzIet+7

HueO+SvbQe3SqwDErPGRin1ds+eg2f7cNSiKDlbvt03iYsm1eZ+UnadXjMw3yw5DyHOfPCmtAQnIpRNh

LwhXnSNl5TT7w3fgUgly4UP7SA409hPND0pOaFHyPN
odqOLV66276KWYLWshlgKI0VwD7NXiQWreHgqPWhRXeWigbLGS817seEJooHhFpT+rv3NFqVyIx+DEV8

MfC/utZk26wAWdNGcEb/Du0Ixr56bp43xQA9eQGw8oX6xpdN2NoI6g7LhEiqoE0gntuXU++p4k9gxX6+

UsN50VBysjzi3vnaWfjcZja8Avv0IYeqIT2qidaALZ
lKnazEMJnY4MeGlTIn6Qjp+NlN9nd2zXnkf56WkeADmtXkgQkq/NjAPxVd/mc0G6volcWtShkrXRzxCg

PgOcDNCGmodO8krhBHqJClrqXfbMB9vwgmkgm5oC+F4UzHHXbO9D6AtIOddNlFo+2AxI+nvz+SNMwxx5

MGT9CfvaXaeMueGmfgolxtR9YaEqcVJ1xYFv93b3Dj
ti6iEo067HYjCLamyUekttEzEe+xqfx4lzMNZABnUW/fAJup9hU0VpvQISGJoEsyrYMUA9mwg2VMIm2u

vYDctg3ghvPf7N40uTOFyVeI2qrsB/LCDQ67CLTdgmvVHkaZrP8JGWKVUwyrmSNfhYhvqkPgg6kdtuID

JTPzftCzhw/6hPG4X4Ou5YSoVb6MpSEJcKrhZc9ihX
EF54Dip6/mkqt1Fq1+B45k8FbBBhfzqPVtteoN7hP9sLu1dhfjrd6l29LkZpNmRJz+YpX6CCmegesD7P

Nu9nTAzpv7zuyN6lxYHqdSz8RKam6xNptwrXAND3XjlUUhBtlcwFlXOt5hM/rkeixCItEPT75EGl5Y79

ibEzsV/4rr8/y5NvzlKFLYbcnuQmi5cMOMlYLUAYyy
LkDqo2RfTH9dusvoahFmBhhXa67YctN/9cV+gZGqnVfkPKJ+/Fd5s2lHeEAD0aH1coRvjbqf1atcq403

lIox5wSGpTxx388SSeWYleWDFOlpVSJrFn86tdOcE8pEyWW+Ipp3fmQKv3r4ZlgVyJKvyAeAouFKeNW3

ZgNm+/DRs8SfgkIEGYfBYzaQjHocPy0+gKQGmvLJ51
WyvCyNxPhULTCp4Yij/DD6sqI6ov/fDaljUQBxPHF2czs0Q3Y5nHd1Lmwx+mLuQ6e+viROoJJ20B3LNN

G6hhfUpkZqTLUQSRRw0iet4ts6VMhfhR0KDy7vs5uxsV9/vOjYkEuMLv+u78jZeGZXdStFGdYa9C27V5

uhpTzZDzR/WoWUTN9D0EhApC3bRlCR32LBrPFZEx6Q
Do1HDbC1k5Ls9cEa9K6QN9kTz29bJedIpcNf44tSwhxkCTJr5XkV0zRoGTnxbq3c7h8qOUxbN+zExF8w

LQlVDxf+tSTFsQ5ex2T1+uxdn1wPkX2ou1Hj4+TjTnwDuI+NMkgSu5kJZ8RnVc4LtR77tkMOdVo3WpOi

l0o4eTE7e6lGJvcGEESux8fOd7b+xmA5rW3SpK7wCm
2MYvJxjjl8zK2ZgQO7Y3Ud2U8HNwIwhAb2cOtJlBffSfVqEYXT9bg0pQKL+hObiNCZKHhVvp8P4+Wh3a

pse41B+XHZduJbISo5jGjsTb/zTuR6/XsSD8B0MUQfXJaQUZLkOI8hAwGQ5qao/0ur+yf2RbPOfjcin7

5/yTEpBIi8GTVXgmztbGDS+SAF7yD90BLqrRHmBXWh
pl6NBF0bwRHL6GnClz0fnX2vmA/kh/mcFn+claGKeeZ3ZetpWiRlniUuiTSyv+v57BVBduLZ8ya+Fi5l

pO5RyHic22BFkLFqzhiKbkDvO/EkLmMLVvxbtvGfTRckGKPl4urG88EI5Smmi9+hJSEh480TtVODzr4K

rQMxWPMIlJzJUsojqrVwr7vhjDHnC5OJfHKz74kurO
Y0zav+MQbw6h2HQOoZ/mELEOPNG7sbIKRdQEyzg3pC+MiV2F6Q2UmQ1e9pWtGX/vmhYQ5jG9tP7G19gt

FgXxS5dGqbKlMGd61ha0JJje8MM5lQPsmV2beN48a6pbb/bknt/L6kxoyE+rklxkbNlQUoaw4s8hru4s

Vvuz6yhi/awUAnGebQoKdhRM/tBZZeywyk/aV6lkxG
KpMkFENazDwllQscpeeIJ5CHKnQoqyQZpiYhwWj2wRaW85lnQUUFKsiZGJ6XNz/0zglT4vbD5H6rH3zy

IBvPKIW/0e9/JTpm3ambB7/X4R8Cpki4LI40+gzOc5cRCRy57MLbfhAMRyvHz39B0oqkRC+DxsDEHV1u

B5OWDcGIekikcHfP6CPFkvKqpm2QLHzyVNFt+uSbRx
acbqJcq8UawSd3tHFw8kshlEiFcGTAvcORQBMFQT2Ksq/9aFP1bq2JSlgHzc7ES778fr3xwu89UBlKNA

lCe9ID1ygxmnTPx3pN/bs7+Gcq3XnN5TkzVbBloyUJywPnY43osxgg9c21EmsUtUaKUd9JcOixJgCAKn

FM721iMUIYKdkJC8ncOTjp6Ksl1dbr1L2BJlUO0Lz4
jeU7Tn5xjO7rTzdjCCQB9R98mkNYmr6gpMPs3SJoGXJgWBZZOWIkbehQ3lJBskGHllCQKr0Vw8/yIa8B

KlgkwyzIcYCvI/y9C54gPMDgNxOpnk2hZpeHThAK896GQvKJYBpncy8I4876MC1Dsef9yxBsNQffbKyV

Y77CMSUDGiUqswF4f1Nn/ltL2OhJVjttvCjWWuUrTP
rs+h1ihAULKE0kTlTJT+/xalqnCLTeCje46Rm9G8Xr0xkW6pZpiIyEFMbwYic9h+t4BEx/gmBp2FrGIF

PCJ4ED9Go9dwAa85z+x5ervJWyBoIqAaWb7HfZianltgrKeukVx+p1VLg6h4JBKutp/7yG/hMFoIGMWb

ZBzBR6E3Bp7WeR4RdJ4XiM6AWpvL7gOkq88LNhwFfG
UR/GifAj3KywI3wc84YS8VMSoU79/5xMtHRH0qfYqT4MfYQbPiqinWxN88NbWl0gz923JVgZ6B+b8Zws

pEhxHo+TAMlxoAcZWXnU7mkarEKnA5TOlANhUYE8AG6/zGDWyyTxn5Gclfrn+ivkjPyzqGG8wq/5/iOA

NYCt2SvqKQN8I4klabtvL/hCL4IaWlKAehPKVaxzxY
qlKHdvigmv83gjqaHBPvqCOQdJlwwyWImjqKGB7jTGdwHjl0GMnlmZEut56fy0i54ppwFMZnUVT+FMwC

Ku40s4eccfYfKtuVFoJ3ApkydaA+XCEr48XqUHXDgHAaKUBRrfzCWuLVESIRMg89P1qSi0Mc9sQwzw3D

SrkhE73m//ECu4BoQihDe4gP+vd2sUlALdgYjbF9EI
OVVoYxTQpB3QC8BfC9HQ6WcDMG/7SZ6FilmIkvlASsPRIYWA/5ho0yCsQJr5XyoaXyIcrk4mVQMk2VxM

2MlwR8GfsDOgEgq/XXFatDfT2N52x5sYaBl8HqVS1BlW1B1nkhlvnbl+Yy4rvK0lCx9EseIuFHqWzkSK

M/bg1JhYTBhyWzbihOXJYRj8GYia1wL0vEaTCkjp78
rHuYie/vrpamGtOB/sryPE5aefn5WC0s7y/MGLKL7yqkL/zCRj+6aU9nmdMIlRRCXbwCeVZDEvxvlHGK

1KJ84vrHlq4v6+pZet6aZ7gTra8wPT8YHTF8dD6xWdxONffA5nAwDvg8WlfwHyiHNRfnqYGZeQA6f6Td

kkKxmpCGwC7YtVRbllqEqZ/CxYy20VdtRDgq3T2ljz
vJwf4bNc3e9kx7vddS/FOnGBr4GunSONFUR8IlMKbNG0ceIBLgFfD8kiI5R4/vaqYlwD7Gzk1pEyGxAX

8Zc/ewwmd6hV2dIBdcavfV2FodUonQPNn2/wiJu3pBIdxLW5AYSvUIH2SISDpI4GSC/Hz/pLY8K/WBUp

eQcvhGC6LeUE6gmDvcklpU4N9L5jCmFaTxE5BZnS3n
wpo+zrNFVJ798P+TyfmU4fXBODsuso9DqK0bjd9GAUqeae9OgAna5R8bgrBG15iLvpDi+Lsoo+ArTrZy

cEN1lk9lvSde3x0qXvDnTOCscQSN4cqV+iy4o2xf/oSEVPj5Z8/kB5j9HpK/oF5yX5YYpU4NJWjTCUAo

IW2bfjmyt3c85/vMGJJn//ClRLJYnNWzBoc3DmykQc
FXTwUlHZjrmgVv2PZoiEy5AP+5U8rX4Lpgeop35uAgemBEuklYFg9nPjnolNCDZZyn2i5nbSXvs0XLR4

udF3moSWeNsrwxpxHKhN4debAdsDbfx+yvvqzJCG39P1+hZX+rfjp9cvPGlWGTeY0uhss7YAhBqs1c0E

8ab6pcmjqCG9lyw8/oaeumAD5q1T5M+QiozFyEAYF/
uLGJMz15xyzGELWi3ErlQEBGP3ZH+tc6Yk/KsghY6IRV7QySw4ZxY2WJv8K4G07pSYBkdWitirum2eFn

iDBlcOR9cSf6fjmUpAN8GieJOosEP/kPaC+13jyI3ZeDePYF8YJFivqbkL+Vw9wq79pWgq4GKe7+BDMN

yZTBFWMqNnrOpEWORPH+5cTPL0k3onlTRMVa7kBv3M
+GyWQxL8tLWoqGVUX09lNw6Bpl2RV2NgVWeZ2pjxwo0ikQBdmi3zF0W0wHzvyQL+SGIGuUr+92X1+7g3

U8M1p3VEIT+GmsKCtKkLDIwnDge/uKMZKvelaCeabiWFTrkWHyVa0dmwidTrj5+D9gG2ZZiYaRgfY+Ap

G+BrtkOnDkny2ZJT+shhCEZnmGC2BVlt4goUStbswT
ht/sIqGiPEZG0Mxzl3SGegA6dECTEwnGS5E4ONzY0fspUMoTaRrHLAqjkVUdOLii9OHaMOqgBKjkZ9ge

W8D0n+WSf6bF3ag0EyDTJDo2YUvk6+lSQamH4bcBHCt4q377X1z94UhSbLuwSiZaqwbL+jloN9vRXPEk

nxDjx9gEM4jxiktpFTBr5F8A7kKoS2x2ZcPHCEpxk1
1PTSZRz308jSp/J81LepqkLrGYbS1/o/IGZzG/aeXhkc/Yeor3oEXjZfkKOsmlucfUqBuDYJflY76MbN

3v1+HbTPlNF9+uNaPWK58MIhf/QBJufd7HvPnZQ9Rc6ctLiXvXZyYVSqvOTwVPiyRQMbixVCaI/1MYBg

cjRB616K0hQAhFJx8xrdm6Osaz1eqlOkqD6FY05iaD
2E0cMGOS3s4j3WQR29aRaIKxdXhLPjiLfxkWea6/Bf4Xg6HY5nXOKcjnPutXhfPpoSwAU2B6B04m3ext

ChG0zbV27CQOt/Hb8pu2pdx9MIGO0B8zOQ+9KJOXPyY7tMWvYrPVWMgSSwgARXOIC0D9gVmjIFntHbsr

HyGP0kdbjgoN/WKOt4Ss81oNnusWS9Y+wA+T7GV3I4
NLtLQUGyWqx4W44TTvKg8LeDPjWO+ezI7UBHonfOK4OAgAHG650hhDobgQzp5hEpGc/cW6an//8ePoyt

eYhXfEZ0VcYNBYgQqjq9a5xCj3jTyrg+U4ckIB951A0ZNHxJa4Xfr0FR0ZM5ZXtJqBctvhShck2WSgIQ

zuUzySnUQenk6oVI0eWhL6q26NHPzJ4R+g54fryWwa
iPaC8NRZaibwSzLze8QqouDkHuckT3N3ou/el9J2/HC0C9TcluKwjrGyHMEeu3+Jl10BZsKDmkNZK2aS

8WLs4Ah4509Nj8aIWBFBVRg0zmCDaDQZpqqQ4uvfIq6Dt0dhllliHiHf55dlz+uNe4gczCWlsqkKCBZE

ldr3B+mQr0eIy/17QExD4smfddXqNNFAsBv5/vS79G
BvcAZ/dBqkki8GflZEzGpcOFOiV1nb80eIM3c3qx5vbWVNZze4GPAjt/5TfGg3iJeSqFaHw1VQ5THEc/

VIbJ/fBUoIrHrwTO0DfVZTifFNooVHpfLb6h2i6Ej/MG4sqgovYLw2dzIOySjsUroS4sBu9afhQUs/iZ

IwQ0eVz1/sG13o4TqCy/5rWn0FZu6wFNmVbTdB+rSV
S+1maE0KeP3ZD1j7Aby9hRZbwp/NJz+rNtEmAWrNjdf5gDnAVd/S1ywvIRwSwo4XznB9dgJ5U+EWckhb

y9Z5nsck1eMci9j1pMyf3SZx8vhNrOkC5qbKeLf72pjS9UItRKHNwnPfUTq1djqFzHcIzJpwga8dQm2p

v1tdOx2oR66leHTeJYxZlHTcpnFDh5Qcw2jFrIwvRY
NKcv4MPQbRLuIwaClOqVkksmfWVCNAuWZ31yes88pOdAwA/BxaV5IG5A5B1lxPhpVgdY2YLn3DcBLPdp

N+Zxe5NouygTIgfnRzILCsSAxKH5pMCy+7Mh8y3ZqKV/GQvYzve6/ncDWAF2jhZJKNKO8Nr6oQcL0MZl

qkrlQ5i+grY4Oh1OYcNuXKIeL5SKSDcvZ4duLqgNr2
PCDU1T68MdDbe0QmcGOxEWyphrBZ9PKhEIgN9/d04va/V5ahSA/6dfO9ydIx9LlwMm89IhuJ1JlVWPgv

RXizonELS3ynFh2ljtipHr/pSRCVxSiwI98QXF+AIefYIngjfDm1Q+eRj5v92MqDrUMo1Aa5UKP0tbV6

c+Dkoqn9HJDvjrvdDe5g+jNjKgx2O9Jk5lkZEVZ1gv
pqdDWpHkQ0GDd1uX4j1DoFQUu0ADRiVmmpgsF31i8KejHApgdWSAv2RfddGBAgdhjHEzqipwo2k9sOjc

H5g9qBVoyjcXNQMhD7ZskMl8pt1K4y8QaLKzOsb6r4UPLVE+lqDjVJa1i5xbDRYv6/Ib/4zvsvmzRq8R

83bpG+mkY2OflvI/JHCuz/wPGfAxLG6ML1uaACoGip
LNFtKUw4aUxKI81MwBnmDVJodQfVX/ZDkR2m46oLIh2keePuwXPh2C7i/p/craOOaKdVFJdP7KnS+S7o

EwR3P13MibGrpiHSemE6EJZaGLdItlHfzCFKr7hrkFuw9bNL8nnw1b0MgH+85Zd64kuesGn4bJMI+0Ew

UhlPevOPDcn6IMpBmdBUORtyeY4sPc1X0WS/YaUk4b
cF5ilGUCBBOFTUOxbbeE9hXNIfS4QYe2pCHO0Nu+KByuiS2m2SOewnXCQk+q2W2F+H7/pKNYYb6UdM8k

M1JUmVbs7zo1zGSBmlsA6DjHV7oKZjfD1lEFJIVKugrzum2iP+ZAJxxy6aVtf8WNZvuivMNOgwAop7If

DrFs/fQeEckqsmPL9XT9CgbGLyO9ajXtahX3jLdHzm
XbyKGY/TMfoDjeFiL2iIHDNke2VeHssXzK/ehLUrkjuYIvMjjC+R7YyXiExWU5wqLN3pPvUmBy202Gtn

aE3yx8EPUDk0ZuTl5HPs5qwYsjEIp6lw/mx4ikxeIJKHPrRL31B+63CtFFEI8S6A10XCO2MiWHtUPBls

uETLW9Ypfyd6rviFEVGCNt7TpksD40vzdz1uGRZTGS
ePWDqKvMEWrJRHsTsfNqnGBihJ6I2aGSslVHslwF4tjw09HeRkpBw3Hcfz4IQ0PmKCu3xTnvHYAuQTUt

nQiQH9BsrBCfD5BxoKMSxBeT49embFyayzcEMq1RD/PtorTDQ5JOVJWhdVLvwD3kuSSiWtheZIQUqMM+

+lO+mJZEHIB7AqnYU2tarJjOryZ/Pf3U4eiWbO/O8Y
b1il0e3UmUFtK6uIdCM28sp9qMyJbE0kQlJoV3G/uhYeRqTYTRI7CfbSdlbK+P11YPK/h/XKkMA/hgg5

YE0ca7e06OpTlo5zyzZnHOqq3A5lBthAp6MxhUGKh1orQsoIMzW1iqY0v5UnuO0Z6zKhC62qfQ6woEDV

1x7q4hmeFTF/T/QP4GpalsveP1qOy+VSlK8XUkuO8G
aMYQu+vS4MpbLBRykmfXwRvRj5OGA+v03NHbUX/jl30zyuA7CZWTOt9sLxQz273TEoRVwSScvPEO+Z4B

6L1TBcKNx8sVFjSCEl9yJTHetettWpaeAW8RXz0mDlrYwsD88zLyCONyneEPndWRS8i50jjpYZ9lzc8/

QuosbxOtHgv1gbhr+Y2uYRwdl3XV5WBQmwHOG35i5v
84FdSbPj9WArIuZJKH1il+d7SxaKSrKUkd1aGA9qTZP52PMD5d0L4TlSl4tSv96dmK3PWF+9nc0VC/ep

X5/dP6td/Ik/3DDMDWHXUxJObh+HIVACnncRyhbk88pCtD0gtO3bQXEJDwstNykCODiL+51JOcbw91/4

v1Vx55hpeAcx4g3Vk9/ocWkdGH7PO03Npxdibpby0B
LCWPoTeMt51Kz4gQr3t68y35YRMNxc0KiFsPwx23KJaNKA6PPMOdB+Hr8DXJzP624CZLyOpIRj3ZJAwa

vl+7Hob/20c/Rt81ZMtQy5Gys12OUwgS/X2kuU8dhSTQvMoua5Ptjx00m0DUBFkyE4Omr3QU5cHsDrmu

rBKWZtm7ESJGVDat9OwNDo/CK26yxGepJvpjOr2k66
BPMZNvS7y85qj3iz4iQ5PD6EHF5uzHkmZOmo9Q7jO/Yr4fcXzDqvXpU/iVok3y6r6mSioS0NEIvxldRN

7ydnlYJ7FCoZt+Ph/iwTo01TeWLDrUYEISrsy0cmF0UEAFTWeb/5dwOqmny7Z1Io4LtZ5q65vXIkLzaP

+N9VOtTHl95thnkyXAk3JTqRP0cgKD4OoMmZ0pJso9
88riG/t4m7/42BZ9z0Og+f7YJr3DEWUuCKtla1B80bYBuwZ5c2nU8re+4N1xfVBBmi/5ZYPtkD4Y6GR5

M47zRdw+//Qlb/UeR0d7/AZOHSor5OZxnUX3ELpJrWsnyTYcg9r3FZdc3BFUDr+I88m89AKAueiXtd4f

7T2Xy6SrqPOSpxLHZUcETa1ROoYdy6izKLFhj5Alv9
VffxdKlNwj4MfWlPCeCgMr17RDBII3HefvUCksa6qhgleVuW9eiaHDpPA+Ebz17y6mTKMC6/4UBrAiwW

5OYQOHfbriL8hFN5+e8sPyfdX1C/je0vkV4jbfM+jHbz2V/uOn08KHpcdRvzJnTJnr8F0bFzqe0Nhyq5

9aqOsfYjb1b5c9xgciOec6BhRXv4j73xiVtksOBMll
ncIYOSvk5AsC9VW8ssjuD6uaiotu62PEwBdVyjA6t3cpeIORacc+aMefP1zY/XQJNVxn9TrxjLw1z3VK

pk7CIUVE4PsLhV/7PucBwfkA6u4LLw6MgKyA8LqTW1AnVN1nZMgiGjTuJZsfdtqIIxvoanTtCopxr4jV

mTwlRA5PdqdzV0btYMMUZd8tVo/Rp0APAmXpJCb3Zi
FYTfxIRltIRj9OPxE0YM1rHyJy09HDEJxnehcI2Lykz2o44E2gzbqTRfQ7Hq8iu0oG8WcCt3GweRkuNs

2srZMO6a2aFiK8JoDN4FRj3zv79uXzgz2pKMUljHtoo8OAFIVN+ymqJ0IlYjKelAG7g2QU0Aa+S7UoN9

ZLgDdr+t4L3IAteVuVflE94ITI9HY0IjODKZwGbpan
lE2MASmGywITQqzs998It/1yOT3FHL0MweK2X49F9z9O+fwLd+XXkjS0XmnMzI8WryvRVvJKXoszplVF

drHvm92CXfbm8fq/jg358HAx2OvwXLBNcRmOG1CnmvBA0kcsBebrfS6TG/O3iuVthZHQpFqwoinO3Ewo

V3mYPTUbf3hCnOnqav8hqRJ07wXA2gB4ffFyjktsQL
YU6UNkrf1krYWppYspkc9bIVdf+6oupWVSsQM2pwF1qSHSUfRuAFqSxMFNZ+ZN5rdIB1kL5cxyZZgP/Y

21Jvffh/E+1hO1DHlaZwqCAz0O4N2+5h2AgcLutVQ9JkdKDQ7glSFnm27fyoPs1RwB+Y/5G3LTngcs8V

RP1v1nkaQvAPXAQ5CW0oDCvWkXssOc1J4NUDNEvtfI
BxSfo10meDgMj4hqL7dvLldmhxI1TB5anjV5LCY/ss3U+/7vIMS2wNVRDPu/jCPnfizoHoPyUoD7XeCq

ErJg7gAu/bDaIlgz6G3PczcKwwF1rGmP9AL9uF9KwGOZD225k9xz0uFTrbztRl2AOGCgWTkpMaYb1agl

6BKTA0Cu4zBsZZ8wXYB9FWcg2Ms5yF0KDmghDV5AbL
o8SEL/tWSvAXiDxF+DPdj7SsSTeFsbPC9nXjK9FwJuQvZmyuSa0/Or9pK5Y4kxZTXVGnQHGYL9UZlfQh

8jHBWnpYGO+AO7IxgbOsu99umOpRmroQaoI3aozd0INYM5KYu0lTklwhjX0+hQZE5ZN+FsOu9tHtnqHh

y1Df41Uknkm9hx+bVH8hbeZd9mMFPdJkRIzJpmtPkH
VdSFoE9xzMsuybzNp5IuBgMVEWg/b//EZFKqsE7sX6KEbCXBvvvijbpVSWMLjykhIB4EX6C63TPT6ivM

VszIBG0FTv/p9gfqxN3Y2Zhyq9x1XtLN3TXl70mVF0uCOhlJFyMZKComi3q6VtokKeNjInTGRXeR3v5/

ry6uFWifdUVafnsuha1b86T01oI/uH9sixICpDGhI5
1FEOvbgQzZllJju+xJZc6BvLhMpNDhaLowS8jx5LRMyUflnp4DI+Q5RaQBhAlVx70TAEQ9iwL+HWC7CU

aMyV8OuXiddWLPw/RGgu9UwX/PGYSzP6u3Iq74RDHUahmk7gQkuhB8Cj4qd18Ghi/rqPin+1Uqg5WUkr

i5bzk2mkHMCC2crd5aquZpN38RdE2hsEVY174/HRhG
wGLeWO+lY14IOOc9YCTu9+jC8UbVGMBs9lSbJdifNFrecQ5dQK6oti4oSL8NmyhMMoyO88HcREuBMRxv

mSc+cTAn5LC+3jw/e4HTO/bHDzxZdhs0+w/StKH5wFlW1ZPp+RmY5EpOLTdXBmy7N2j7HBXhbEQmwbrk

8i2AvPo1As96jmg8388y5bkhQsuOQupR33A723e39v
MV308ULzXCfWTSOdEcz94CdQ+vzy4FK2u5g+Kg9u3jUv+LJQLtircbVOzPpi26cTjOLMcwZiCm3ZzwfK

xjgU43XvCuEwjlgz580c4xMYUgZcGUSWn+aXBXA4gY6OFE8Inb7agA/ynvyyBYh40bGu0mS7ouF/acJ4

+CM3q2T2nz6Y35tB8qWA6jTO/9gtjkOqTnfHyn0/vL
U9K/MKZZOOjm0i7yJRP7HiwkRjzl9uN55MaaA9JfzcCGAv6BhnEZBqfgQXfj5grlxjw89A+Bmfg9yxgj

e+pN5nBEKJF+i/yO9H0F5ynDiMreJru6y0wy8aw9lB71DUkKX+Q8GzNt4o1qgb1mZK8D1lMXZYdnZQ8V

aaeO2/bvGwVJeoHQqXFFoWMH6Eb34TwfeYgN+faNGD
7uP80duvodVve7hTGBJ+EyHELWQsbF7SX+uBPyGb6dtD8N2gGZrdvHDUXIrJUd1y2ZmdiHOY+2iUpTeu

6haFqTi/Ivs/mA8C7OU6FynAahRFlPhaOaJouZlG7Ld0zX+vV8rMOBoUnP1bYR3zs++vqlkMbRxe4/kP

Kcxj5Uzhvgpa8R41grwOXOCHJ4OQioQ9JamShJgT8n
EjLMHVQ94JSJ2Fsq0SAjiHeguQwpJoPnQJkzomlLQq9zwtslcBLXDl22ef7BERDf2T13JA8735k2bimp

l3aXLtBedD4no68V6FHHBn1narS+aQI7agPosrfcyMqoa4CKxSBPIGJVu0gxvTed8iV8TyCGK3yjPAMY

EWzmF9gEbUsiC42tgajRDMfEst7CGXruiHrrTblEyr
N7mnU5x3tjAoc78inQIs2l8N/a14jAQWvO/F1zWWZVomu9ToKDKb6g4ZYIcv4S50RL4NdFp2WFsiiZGv

d7WgvJCSo86h8AJYD303D0hEhlnUMqff2aeNVtTCU8oSWh/UoevlIyXmUu9JCG+8etbwbupIqVyzvjSu

v2xpp2nldL0cJzy2lFEvGkgkiHV1hi0c2LduHfUfVz
Hajd5onAhTVLd35lwhfm8Ltw6wc5x9fdARi+Iywwl662JBY2+DI2UkyK+eutfnxHlEvlPnRKV7+rQwxE

9EOTVVlKwdlPGXBSXF5SvdVI+K6IZGlXI4SJIE5VlLU9lsesRY+EohFVfMwEVLnoy01ZzA+vssTe+7+8

loKRKtpD66wNwsagwdLlhACZ4SQxuk/LnoDTliCfdb
m9UX+tto5NROjdK4N1qpqG4qglJMoJIZYG/wvkD7znfbRluPzjoJCGY/IXU2Ofj/VCR4Jgoslj8gnJ4/

ut3LBpauCwFwo47Rkx/NYEbHRWkbdkr/tFZbyGDIzPudG2xW3RcAmVY7+FjGebExKsue6CQ/sVdLmn1G

WyIblzF0qRwqf1fxdaXrw9Oj8TV9geXK67fyYhzagA
gOym0zEEVkK2VJEzRP02P1Qwbk5bKXJH615jJxhc4lP7wN3azLYj2S228pyw40iqU+t1qaeIgJ63/P05

wX0F9utIK4wWwZrEG1aKQVxhhaz4l9Wb2gEojzP6lrJanXLfv2KXVh6SyGHkhxrSnQ5CjaVoDiHZ4ciF

/tLf+B7IVnK6YYj+vGOneYv+OcuLS5MkXludK6Vtlk
BGNuydHdLPisi0Lye46GP1JgkKm5GHNjT4Z8eIg+64VwYqpbXpUd6QkBDTuqjzfnQ4okrI/vDIqz2GxE

cUg0Ubb6Uiw8RNWl3y510mpwklyhprQvrs7AKCx7xRmXfMdYg/YpRGqkvHlEC15EN1yVJFkW8FtahGAV

+gBusW2cBIxBbSJOqijpEP99b0sOFWwPsWi8aVnaBw
Oy3SHCe1dFRCgc4dWrlOraXtaaxwFqXSSJ7FKcqWNpSschF2yDHjabkf0O0PZG+S1eiqXgfNv26WdyGc

wja92Vj6g4eIidHadSwq7PCDHLiWZe5JSQ1eD6zZM/QfnMy+3k5dWtRne9B7NCh0pdluwrape/JdKqjN

POiq1q1xi/NVS70d6FhSUbujPNZtnaK4chbqK1c7It
2g9rI55qTu+ke12s+RvAe8Bb4ira6GLSzyWMePT74HUiQq+DnAbBJMl6kQRBz2AfFiiXiHITOKjFYiw/

ySmVpoBtMpGhv8gDTlW62GHpx+imjPZgE65R1b3tp0zHNYAKbO++C9lNnVqwrtgNVIR3GAJJDJGfA60B

SyNIlfZy/TOCmmAmdLj2ZsEhmk4+FrD7b6tY0Ctr1+
12ZjcLBr7kamDsrR3W1AlkNf84HmkOLLe3NQ7yE0RN0MQTSs68E0xBkM/IOFdcxWNcGtdgoqA2Y+mLTk

jm91kPWDxAzrA1nIdYm/VDt6ELrp0RaY/RaDiz4zlYkMaDihQ80YahbFm32kve8vkRZ0pvbUp6Je3PHd

dHqY9fZLkSMPaZ/oexR7APwc6ZNixWoyXbaS9d38LA
UNe/gBy+Q1N05/jWTba/PjMVh6jzj2eaJUF/vl5z/ELCnC+5SxnH1AJoKuM0lSP+zQ0YqkUKW6U10A9w

SN/RsXnHI/nXJsfHMeY/ff2VPXkS6ldtE5IfN0PopPePm9YMW0GlhWtn+J3TJ5N+Q0DYwmSSUX5VnFJI

/rsR07cjdH6Q/iRkNEpu1P6vpBhjjVedwD/W/9wei/
FHd8FFCxxOb0Fxw2xeZukFYuLiIzInnLUzkHQMiR+PwOKEh37vlA28KQBvyv/YGRK+w2GnD8OkVAwM9F

zd+VQlyJMnDkxaMqUh13g29A8IKkr5uI1roYmuU/v3En7JOso8acdy2oxFrrOqL2fW4PHkZT+WIpws6K

gbll8oS8hMbc6bq9TOnS44fmo2sugUF+Dj7tMCfyUN
daFqe+NgaqlvcMlfmRMeMA7PTIaAsHRwSCXIYP3omvoUzZS9W8VPUAsA1NehtPgoffKaMFx+/jb33lD1

0sVyKnmeXteuAi1Fc+v+sCCrUneFPKQ7kgLDxCi1t5+rfDXr9fYQYJnjv/zpfe7+GiczxYeAJGAnf66D

CD3p6EPVoLGnMMi/oIuONhJO+Da/bcs0dh0rsRhWEg
xgXuSLbTLRVWOQSBd4hDCSHCgIwgsU39o8FPSWNWKG6aYaZgMUCxlyC6pWK2iOacUpA6YXOXUiSISYmS

Tw0g3sTzmaFPmeNYTtVQoAtUARMMM8GmJIB0YcGGixI1L7NQMWIvrQQ/S0bF8jiahx99skL9CzGmspsg

hQU2kfUhQU7N4wrcgkKd7pI+66wYFmKehkem84JhI6
5iszhrBEZzU7cSivyGhVAsrfXCljCLspLAl2etLo+iC3WozxQSxCp8KUntR1ZcnIZrEaP4bSMhLNFpCR

fch764+JCc5QaP0qG/GCJvQf9L0c4OD4e6lhcpGK+LnTtYGw9+WwNTWji2Ge8zXkaG3NF0OUuvDzn+X6

+BpwQgQywnq0MwInrD82PuxIVRFtOS1lvG1DhDo9kH
v5YQEy13+BtiAT8AEFvM6fsG3rf8FIeXE5EBUHINUWfoXiu3zcUI+8c3jh/Zn5s90ATVFOHOfaIGDyDp

OzGymU+exPT26pNZAfydPXh18pGonnRTZZXbrPeFnz3fkS8kXL4y/rgnouMIhkfQPKBk4hh3869XAqYe

t5Dn68UkstU6BW2V3rC+jiwCHvjmT7lsKWTUr6J0Yu
9Bnx4vn78an8+bMooBkvkK30/BuDoO5QRxXKchmDL9rWZgBXpXNh8eZ+y07BTegkWUIMBSGlpswFwqrm

1g4MUaNQlgJuoFsFFLm6zzq0vCW9YA0K5hMSTDHLH5iHMiZCzZveECLNyVCWIfOGDf3bzNJ8wJSiUbbo

MI8rHKrlEv3zBGjMvDi0qKLXsAiOhoxoORluEAyhnz
u0QkUcJyq4zpiqWwkI7KxrftVirSg8+9FQC/rTIe60tLNG+BgQ9pBCgBWf6FZ+wIQGp5jx2d1YfnOx5A

1YIob1SNbQxMAnuVcx8OmuanFzyXOunAn95b2z50Fp6cyrGr1yW+sH7mmv4DDU2nQW6EN87/N/d5/c1b

UOeqq3A/sA7oYjSESx+0/TCJeLrAcj7OxTPst+zPKx
VbOf4sQt9vQgTgjetnq1jvGrADAlHJFoIZirsgBwz1UD/j1zfWYR2Hmg96FHDYA6LIQ8I+B3KiHxaxG7

LR8S6qr3J8gXDAxf6N+dD3EEYk2aAWl2f0KouK8d/BVmcenQzX4a1A50+0rAp66d2fyhshfhB/lSzV2x

cMe3XXo9iQbyHboltuMoNA063KorD+juXmSdbISb4V
/awT+7E7gSK3X6cag3fNwi6JwVCPk/nKAJKf6yZkOa1qLyzzlKgAy6YwH40Mr4LFlTfq/8/jc5vl9pyH

07xHyxwI2987VoeA+U+CYvsPM7byH6LGOHmkRXwSgPMZunO6opYedPC30G1nAXk5Ofy7IJxeiAi4Xus4

wp6emvIvVtkFjVpP9zmJgGj/drTumt3Xsa2MLXH8xR
JkHKUSpZqDH6hnl5Bfk8tA2hsJDS7XZQR/Sv8Iesj43IuHwvfVQdrsGUx6fZ6pO9AjICsbIh4w1mXnM0

YgEixJkpoxNswrCkTYRjoO3JBdtS7n9ti/lzq1Aw/FFgUH0NhlDHU8aHyeMkCOpDf2Q5rE4XtxYPkBFN

XY4G3N9tI9SUJtRixzKQPvnuz8XcOuAB5TqN9+qRUB
9GKMtyP0wz3c/Kc9Z1jX5WmJGDe0HMnK3pH1jaTk8CvRsKIEgh1eFgwpWpktGHOe7wmdbNbUMShTS0up

Z/EiUqyYoKoNb1DrbYwhf842w1rycI2IrECB9AanHW8W0V30WkqS/JgQIF9A2qGy4yhyHdQ5nHjVxkzo

IfT2ypdtmNafK4uzFvKO+klSei02v1EgtTxILMy97T
6p56Z70+5hW95bkJlhl6yISwv5cjUr0sfiwOXqo24Dz6y3giMTfsAl08O2nKWYtJz2r13eS6ikDTpgG2

a3zhgOawXCxmB5pYVDZP0bv+O4VVjmaGd5mYaYnd9ov/DwbNAa+7dXVgXYPXsEeGGSz+jI/ddW/L81GL

xU1B7VHbS31jiaRpcD4ZR26/g27fdeJENz8eHa3A3F
yrgSlAlAUH5npoHN468x4lb/PZv5pstB/iw8m3sEvQ6AljLo/QnRX9r379hbK5pm038fTbeMxSANzavg

gJcjb/0Ba/q8xCaF1PYNIlGBW2KNXaagabPmQq48whs+4tOLGl+bk2+7a8kuyzOxIsABMbXAnWrQ4s8l

Bmg7+GmKQ0OWMZzdlh5jhwVa8Ux+0GAcjD81rduh35
3P1S/hkT+tgy/mM4ygMqNS7AxSOZ7aqcmdTPsdMS80Qh2pbV86F1k8kfpozVjc7d886H87Hdo3sJ3MWn

ZLOwdVl5dLFftFnoU+dw/KDR/4K75H+1QbwZuHBu69lXgbwcV78HiTR81mx3jHy0TyWPwaEvxf9RtPaS

eF+j7TqCF9jB6QTBNfQos/aFKlPi0T1QN6janRQO0h
5qUH8WP59fYUdTsxJH5O1d2M5ZT82UdkA7HBKzlFOz0nO15VE9xc2v6k1h410pgcmNS1ZYyZl+iTVld0

1SZm8Vf3Xg60PGWp4je43t1wJIMahRDyZ1Ao+HZmRSev7h89HTmEQkeh4yYC5S8bCWDrKouv5W0Kwf8m

7yOMkQBWB23UEOh6677E+Zz5EH2M0VzLcSWMOSBIIj
zodsvBRjRFSbNtmdMtHaeNOWK1+y0yO9GMJ6F/H/gdOxbrTtfQInEBiSjxeqUJe7pXXsLqrL/RELRYcG

umC5VblqU7AR8sPAiMfjEXNJvKQPYb0P8c7s9+d+buued+OB/ufj+smf+aZPJbWc/S/8zbEpaXyKS1AO

YvrWpB1Cx6Cq8olh17Io46qPZTkVS0uOjQI4B4ybEy
5RJAJtP7NUvSr6/Tx5btj7kyXJ/KlzZUba7rxOQNmAKHDuduUaZNhF8tPDvFlNdsbWQ9VcJbB3TduWcT

iVvCwkmBxVZH488P8egk3d0F0YOPjrVtEC2T71RjgbGRnAqYNbilKkFwD7MZfUu5BX0onVyRIsfCL1V9

w6cswGVlxv3t6S7Q6RqP5C12i29Pw/cPp/rv7bn/p4
jJOxrFrTSHee+/LnkzweV6g9mZrY2Wz8LN7XQObsfuGfJjubSxctKGdgq5Bt1xYfX5YlStqCk3OVZ8Zo

Ijgk/mCsdxTA5d8cUARyqXnsB6qR4gv4RcuEEtaFC7hw/kZMJzgeXzIpkUCzUlWOu7771rVL/3WfBzr2

UmQ1bLs4/FbUZQpJaj0hBbPcYjW+ku/j5IbGb6GRPz
J4SsgiXihPLpFRRaIqYTnKLo4V7+TFoqwLPPLYn+bDp1MLYDFoMdRyUfXNKxur3OyBMw5K31F7YqUxTW

t/GwolQbpZ8EEZ7PutX8SoT31ghqvqTv9WWF4torey6ha1OqKLXpkWRmbJvPmN7ZHDdjfFiylgl4XhR0

T9+DwjICG/QaXiGSMVOJEz1YiY5L3bqFknff+XidP2
It6XGsYcL8RK3uUs5Mm9NtV3Mrag+xiM62G+aBTOzyPecMZ5/MznxRyWx5OTnMScWkBRMWLDpMUhGz69

aj9IXHKTBbu8w/Ey3fAegVu85X0GyACrIdjL9Hpf1Qzgxs3dsqnvbtVDUDJdcPneb3DG+h41svutRp1d

rmZysqShtN662StGIpX0ZfMs0iU/iSH1u9z/gBhN1z
Dzgxag8+n8wTKDqSaUxmnfuRjMl/amBqIUSuvoGzSfqQmBTzGxBjlBXA8NkTXnS7o9uBH57FqtUXOu1n

IHbaAhIdks92tHeshETDpl1UNEmcVa8kl/WX5SwSCPNI+kLd99uXourAyRL1Sxlg6Zl0NuhJx4/sMNtk

2pXbVBpd520xMz2opKMImV2ixHsKlq+rXkGxAn0qYh
NOG+Idi7bjh6Rgti0JLuP37LoatJhpmQCL6SBFNzovYJuh+wBI62KdNHFVJpo8IylvVptvu63y+/H89s

k27Qo9CDthWy7hwM8OtE/davj3aYW03KkCq0/ex3LQrJwVqWI32SsKerPsnUbJX0Jg+y6vmzYjxnH3Qc

4AB0sEzbSooXCu3M8g8qRra8ZR9MhexhsZZSzObIJE
lynnHPfPSNFmqHHMtN3sOat4MiFF3MR/90bP5zIquxsgB+4kEH7QX/tJCV+dzilmHPxGqbdZKQMU/e3z

cqqLHbf6b6XPq+JHsBd4pdL6Jegx5QiM4HwqVJqm/hqc5+6+68csewwDBYNKA+Gbqwskmi+S0tQHeIAP

zIaeTApfZAMttGgKmybUfIqgopvrzNRFKypxr2Q2q9
pfmy0xmZt07ejvTbAxDZ0R23jg/bQmF1/CIjlZLh8Q7723uQ7wqq9SGhQk3jG+wwZNQx8cnAsbPfbSNI

ztVqLus5gD0ABBb91otg75xg6AkfPpzEoHq6P+xYzwIatWhz+lzBphVWtmy0nbmFZEKVdfOtDi6CRnFx

LmArBdyHZv33ICmExoARI6XcKJ4CK5vW577pjSpjnL
39QUNcyhGPFKWVBmwJcTEnO+QDH5El9pqfh9ZD9vzPAxoPUaa5bK5xOwTssOUfuiKkHBfbp+xJv7FRuZ

QJ5O4peyM7VD7wdIp5Zf5lhZmCVPyE3UHLlj64MxQiDBIWqPrUe+nkZKjRzc207EuCj8kNgIRJOLwoGt

Oxho63724iCkVzfehKRBsGB2LSi/VAMeE4KHdeHwNy
ulFcdu33bQShC2g082aa4PJ5zv48UdVrk4D+gTaqS/OTYeosDQGjGI9aY0zODC9owWYQyDF+DmJqNFUz

jdacvTgs/NKhEGah9IkMGE+/3kZQvRdBGqA17iEW79wO2PX11mM2hgttEFSNNivHYoL1K+ipqo7McPki

M+QB/3vVrsl+wstK/L8RvnQpuYDjdsAYFVGRTNexM4
Q3+hU+G4nCNAgzY+U+eXis4dRMfgt2tKYqL1pxH4evQMT5Yqz/KeqEwR1ByEF3ZAtcb+HEnd6Ub/oG9J

/TupXw1yJLZzIZlDM3+7BOySGLBaj4EIVMAZNk6Kc1nwmBmzpb0O+4ed9jNLvJoUs8Ly4jos0iZCvLwx

2dJcUHuPBY4Ej7Q53Gxb5WaslgRe2V1JMuNskBCVDS
kQiGS2LJsa1YnJWXRyFQFTOM6FauoCXlBaS62SwtuQPJ5IQ7K6bFy1tjEhCvQcckO7GWbkfMnip3dGf1

Sj2HYafTL9oEe1yel+QhaW5sDzBVUZMPxnpxminKJEDTX5/1G0in7P8fHWS/nDsefVVSS3w34PVkI6yJ

HesuxDJg7s1DmcakeaJh372oMzYT7ewdah2byjb2cA
5z0Eyg6qNULqAwF7zBxDNhrCHCjLlE+xLsXHxHeeFIsWrcXxiXXjpbX0jRu3ZktgdkKmJIoeeI7ATSXE

KF2JpQ7tIeTHuQ7ysjmeKCz10xNyD8ntt1ZpGWXJd1Fm22p7OQbYWJu839V8DP2vjbQDPA7p0DhWN7aC

R1/u0aEUNPaKQd7ug7MR+d0giQk+pIypu8haOxzfee
vgy2ihJZHK1pRg8Z+tbk3wtl6qYno5wczAAJN6bjs7VBT8tZWMGbZXVB9Bqlt3pK2R8eIQscqfJDqBW3

lcQ4VrL4zesY2T2CBOjhCbu2yXw4rXSf6v47SDmR2U3E9J1JFJSxOU0nhhXuPlAoIDfRr6qoN6K89MRV

IKpRs15xL4LuyQHqoFfqggmY85qTylOePziE1+D/BX
wuj33AxW534QD3bjlh4/tJJluSRGWNpZrf60pqA7V9glUhpvrug0gkKSHmDceNU79lAUv9rebCIQtN4m

SC/m22I+Pyh+W8iBa5U40uT7JJIPBxd2eo2qPAGhrJYxGYlmwt7ysu4P/L4YRQQ8uP4D3MZMCwmEJ7jr

aTMqt0gfpYNNsyBV+a7+UsQjf34whjjNhGedZdCanB
2Stx7xrYvzM0M/UeXd28EuMlrCFVvjd9l4BQVpV5tFopvliksPDo5QQQ5VcxHCIunz5dYCyLO/TpXmQ0

yZXnu6PiI61hOGF+o5JcV1yILneGGDl3UgymeLN2WeROkeLkBUW48sm1EGS2DStfvQ5xSrt0zCBLf5K9

Jg0lvPiMnnJiTOFbDAk8104vCsuJjqh1ppzaZN2V8B
JehsP/nWiDdl+2NTxyk5UBwgnWQUzmTxU+lFWhXRsPiRNFNZuPVt2wqUuUGZ8l/jP9b7wmYIg36WKe9G

wfHQprJfsG0zCBuiweBqgHbje91PzCc0DqnZLgcal3ktDCAIJjaZiDZBg5hQ1C4ajXZqjMlfT+cGUFqF

jWyyAMShFk/538wqipn9e9njkfWuqUZ7+HA5+j0Tzt
VSqNj7i88AB8o1eyU1izpImgxsqdx2Y9vtPKrk2VoWnMauw367Hi/RGqTqW7e700FpnuEEWOJkSy6JQf

V5XP1rmSqywQOD1JJyDEgnK/rUhhhLdqrKX93Jfq4QJIxhSuC04+zoujSdHB9Fiaxntaq7i26qaJjk5H

xja8DhEx5OANR/mkoL6iCO9f7FXQVDPgttUHhDfWcd
Pugo9ELpvavgRBOa0maBryCLsWtEnMghPYgjilxSx8FAm0ssYtgF34bfSx0vVFvmqiq4ZI/C10KVshwm

To0XbCZoI9NIxbk9hZmn2L3Rw4MO6+1ER37YJk56MJNSgVuEAlZDpN4jyM27FUxr5dXCSSvHYN0iatlf

tyoVHxCKdmAyVTBKT3TuQraNjfqccxy/XLrV0Dfhxf
hHyRnr1DtId9Eq5n+8/GfteWiyBn/hHNqCLYQXUySzljBpeu2qDvy/QCp9iVIwFJV/aZm9THGLwFSK4I

sGWNftKUgXp6TemxtdtaCbHEd508Spe/mtkGMF1wWzHyieXNTNj3FbonZPJ12oVZnOnGSOJsElGpLM1o

v7prue2L9FY7ZjhZNLvj897Do7VGt+VgLX7ICcViu2
T8cahjzTA31QhhTAnH49c54HJReXO8JpwBGnyGMPRlAvJ4Cr7DpwDNWnDYxKxf2syC2Z9hcn7O4Q58uL

qy8q4Fg3cmSt224EUV18SYVX53tvkNcbY71WlwUpKhSTAP28/jZNFQhFz8taRg8/HMW+pErkoRePhuHB

KpD85gwszGJEYkktaOIkU8tABLbYJ4UNWVzpALL8DO
tzrZFJLzYsu/9m+8RyXuW4vXU0vhT6R/+GkxLqj95slvPNOfzHnFJcWL3nNnAEgW9Xe/2TAInSrgGeZY

dSDdlAKmPeYYRb+a8bZQ3j+QpGBRf2WwykZpCefP5m/OPd95tKeL353wmLn/IrWn/1L2ilAHVKO6WgLQ

oltiwwBnzUwCOMCrErLb3jUCIlpH/kjXFuMzF24tH2
qk60gS8nLfL4X0BewFMHQaF+T+VfiV3We6RwYayw8HPuX4DcnykmUCj3cNTTaTDqdSm41ZfMdvbztHSP

390j8lPz1IcVH6i8kHsLpDi6o4acR8glS6cDIJiSzVTlomCiVOkjSznoCKMUJ827Pdwo84VxKJyleeWE

d29vCxYCz0EuXPOiOswrqqaRIJslmQUvcmHAiKXSfo
h3b4MPEfxO1hK+NwUxeNJMdf/+NOp4HydZd25GrvGVy6hJQhEt6pyQshxu2Uqd+5GoXFc22XpxIKUt2n

4k4N4LSfoVyUkVD5UvmtAWSVsX0z8m9RWUjDnz94y415nPasV3Fs/3yZz06qgDIcOMl9eSyWvWH0WTVj

JTE6t0Cu2dybyXvwD786Om78C81Uwf3p74Sw84HNJc
UB4MLXGzy5d4TAc788lx9y2ymDlZbW5bQ8yU1siOd1lBZq7RDRIAWSJDiDsIEGa2Kk+x/x6n6vlprcur

TFs1kmcRja0qrWHVkf8eP5O72ssKfbxeoXIlj0rNbzgvXuCa/AR0fCzlBaKzw9Zw8l4T3IHA+c7RR+d3

3Mk5ieyAdzO3C5afgX/eqlzoc3uIRX06kVdviwhfKC
hyOsb8iQ/tO6TchbC7Vg0VHbkNi52kL8EK5hwrdL+CZp4/V+6KwZ0v4jVO5DVWlqsQuEWh9Yjwgd/77g

ozCTPeyD4P1GcrkfzLE0ED1O5YfzhjErbo5/IJZIJ05oi2/Opa9yaJz+U300fxLc4twX9eZ/m06I12iT

DG3lUyjB8XU0uO3saulOFO1rJX/Ludmila/Qla1p61V80E
6fIu65VZeKDC8W/KcNv5pJibO+5WHYmZc5DMr5uX8KkjR3kpX91W00pxBvEgKO9i3CIEy8q3k9cR21Wl

DFwLt3d8alEoRBL0aOeGDhw+CS9i0/5Ves03gOBrxfGoCg24hi7toTcT0k5EqErulb0kBCgUU9PeCQvp

9RdSYYoBKu83uqvxa+ibRtZzd68+eg9FiaXw7TV4lo
kRq2BJEncfbYXse6QOmbvlZ0XGuvp9ktksDwHQ7AFxBBoinzp3P2TF0jf1tQHLFwev1gRxO9ulWzCpv9

Nw9pWgevnrzgo1PfBtf7pI12rKqnOTexeQCjcg6wp70Ok/d7rKgaNOS2SKJ0gxN9hN44+pMMLZewM0xu

I7l+m6SeGIxRcgNdjQNLwjxBw3hQxfanCZV6pXrALe
+VIt4Aru/Nm3NDwH7KfeWD9JmcfycO7P8o2kJ34pIAVdarrKGYM6EYBtW3uCvaERP59FM6Gk1IyyYE4r

RopQ2Bg4wp+ra6B04sKPO/JQ7MUlngnOiNp/JHZK7X4B6y6ZAP4frwIXam+dzNvDRg5dyInbnd7kIVqI

td36Uj0zBvTzceubLHziyO4ppiqrSECSC+aFpqo+Hd
Czzy9UdTXp2xN2XxAhFvmYSqfUqpnpLvzYCpGFbNbRB7Iqowdc4RwjUg2PA0E+xi9dcKXqwMPfR+0ylb

Lenny/Y+RgtO43L43z9gxJGKlUg0QVIBHZSyj1ZfrEdYj7/4hDwDTSgmK46agFQSAT6FjdImNHAz5+Rge+

501GiAmeirPC4ltarwrFzayMCojUdfdiT2Zl+1Qqer
uJLlMWn3X3cLsnkUg3ze2mHKDMfTcw99SKx2NImaPoMIdE3llWRjcrHoz/A0IgUHWRM010awxH19fFw7

YfULRgjnDw6HT8porv6fB/fswjl56mSbPzmZCjMgTvpXH59aa+ugr0rkWvKXIQzpNkeoLfo/ttN/l+pp

8q3xghjuMsCZ1M+07l/mcnYDZLiw/XAaWIB4rOd7gK
WSCps3Ah29hZfbxRVZ4JbatvmYkv/Z4C0zerDqDNL7xbOcFaQRho8D+jup/exqk5dAgFmWkZReHppiLa

Vs5FeEh4X0w2L4FFi6ZZu5zsKFt3L5pNhDjtmnmG79mp5FuQ+cUjnpEpx++vICfxB/TLyKFN+BISIFHk

xjaaEcjVsITjGldlN/+rDkPNbXTx4ykqrRWLeujxYZ
vVK5m7IRlBnOdQpeMye02PAB7kDjMJsWwc03cnuCWjfkBhu5FkpG8MiHTFYlgU2C4OzZmkm2TXdfOqMl

Rj9QEwH5Rt1D3k1C7K3oWU2wf/JNGS7t+e5bjldcQD4OF0H2c+x/Bt8tjG2VkF2pXxhHQFTn1fl1xpcH

ceSraSQo9NEhCKaw71r0DXSfze6T2c8oS+thvChdMr
mE9IOTmNJKJW/ilwEsVle57S27cN+peIOjWJ3IOGXcwkhbkv0HVfbAkt+9kgoZSK37bON/aUJkQ9PhnY

vNLRvpxNKVYn7nB1eTU9qCiIzEZB/D6I/SopyJLQg6kJiyvEnIjfTxvDJsE9s3jpQrIuu38GZHQBryk5

eaCNKYehLixffchXJ8NLn3pOkDVJzqiEO/KxzbJX55
WOWhiAVpgo56n60+SmHz6vL9RFipxWMX6jzUA3SYU1T29q1NKMYphQlQT62ymAt9m/sZxDoS2ROE6yVA

9Q90PW7A98R/ReC8KadgTfJ3EtOBrFDPQnDbmqfjH51p62qzwhiPwv1ckH/0BXU7rk5JF189ogjmH1r7

BkzeBbm8jCxtlBq+jVjr7Z5KKuuqjcuZ3uVa+L9iE0
JdnnMeP9J/S+QRev7/bJXt/h3MoUyEYvE5eLwURbgWJAZETe4TaF6ih21jn6151Jv4NFkXqs2Y4Gh0DQ

KNMpC543TfWnuD89r5TcJznqnR3awMdCJJE3EI1bZl1aMEGzc2CXZU5paclHv28pKpHcAjDv5iv6D8ei

EyxkH1uZFeKf8fZTvSbfFRjFzQ5BVK8jsCn7oayJg3
+ku4mRsdwvmfdaVU86YIPY/1cbcl4b/JUesKpwrRkMljd5j+NkijcndGuuA6POZT96X1+XM2UGVPkkmW

cvcsfCxGOFJ387Q+KJA5Aftu/dUrlpm2VN4L/kWxPflTrObiTiYg3teH4aCRXPO7DV788qZbHfX3iavh

o3xo9mciCACbTF1EN+FlkdaZrjyzbD1xAtQunhVwoN
eI5pH630AJiTKDv4lVJ4vZgp5TdNJk+n8FBrbiZAzB1FF3OL/1X1bBsnOTU8zE4MixJeVlTT78O5HzyF

aM7bsXN4ky85dudmGYl4zqqs/EVTRyR6KiN6HJpkkZt34ke00Rm7u8bjzI1e7TRCkCXtDjIxz8GXKfuD

l57j6GSgl5rBdKYoHDMSv9iuljPDBh1p2rBW3UwfSB
AUlHbk9CC3Vxo0jUwXAfdWlYyZvaYaQuiqmd2IAuet2BSyfoOU4CXPzGSgZCLuDhv6l6RjH5U7RyD4gv

gERS+wKnZQEcjMBZjTo+A/qJ0TzwFu8ebeJ0xk+NUe8h2bWZ+c3lXarB7IiY3j5siZ+mL3TQ3Md9anwy

DxnrSjrmI6xJbGudW7j72gA0Jue5gUUP3rA3h+qnwX
goJ4evbL4apFrUtd8guHKyFwOeMgQ/kItkQCmbmUZBdXpCh41sdHigmin4p9SfRt81pSpgVo6bNeVFct

DuDojVycIrnbg4bqDxPfznapK36sYOgKG0jLIOPaPIjVLKCtOphxkW24mL2YHzA1dG+BXg+yH2BQO1Jn

3KT29urhHqpFDEZHPEme/0vLWhjfauqW1ZEKhjJRzd
Z+2bj6N3ttsb3yO975m9aWw5YbX2+KY0EGlpxbmwDzvIBomOZGtxi+tHoZejj72JDSquUsp2fnrYP2xw

1cC7RCPw7rjDQV92zSMEerS9jFaCxPhyiaIaLkFTif8qbDDJGSs4mcj91U9n6be+Uc8G7KloIw+Hy6w+

+UT4AfAgx1v67+4eMKF+tgM16Uclv8v08cIE0XkgP0
weqcrf0hueG7FwnL/T8QgvYSbbByETrD93fNaFtzMLSdqt8zX0gUBwxdtXUHnYHAnb94CqHNA6kx9vkD

lcPiqzq3QrxID+ezVZtElr/BJMG2O8KC3ggSlxCyzzANTDi379xRMkw2B9PSx36/Uz6RG2wgGveazeku

zjKBLcCQ22sgtFdxdbujw5s6xFRFB9nH9GIVWdCQOr
r0iorX0VUUXnMdFC01eXz58P68H0PiUOWstrnIBE3l953PfKWYc+XNjYP6HY5hMYdCiJOtLZOtnLMpkM

Mp7Px60eTpdBsM9Rx8e3AehCksIr8RGvNx3N5CCmE8tMF1ZC5CUM/6zDMsIo6fXXFd8h+DPFYlKa++E1

uTCI29nNxyaahjOJWYxC3X9HL+m2LoKCe90KyGYzcP
tb7UL74VHsn3EodywLxmtk7WV6FMqVtzdd9eU9K0SArGJ8I7+9jcAmysoiPGPFpc9Cvv+nBt7bv6+CB1

+y/lZCgrwG9f5EgkTZcxn7jpFfLFKwd8kAdu4w6FMsUCwnTg973tfhPzA1StaYUDaF8QytrJb4zVNULA

cfUR4gs5tnORY2CyK63E4sIxQu/oRo7O41OjHfp++Y
ioGDpTANffNDoIe7X7LjKHFwdgNkdw3S9b4jg3V2gu3FBM2bUh4l9abfahWSmlbfMy76lc0bzxFwElH2

OdQy7XEQDpI64P+b5lYJeTUm0dmfLtFFKC73f6E4WCl5QirrjSZXO2KM3drse7R/ObQ5yFevN+Yzepb5

QVz85X7qINkdTa/B+v5bVgawkJVXSLOpi8jcpWDbJc
YF1XIpCycoHWQD7IFeyW50j02L2LxhZ1cQ/WVPPYQC4n3Whg+2Mbf0iIgC4aIsnVBusxFVpHLVmQVqvl

ZJYHsazOMEf2neMHwOZeqaYnHuY3gCt2hkQKvnfaFdxWyrHfEZkH+Iv0hh6MnKsdD7z4CJIJ4V4c/M1G

YwWPytVwPf1F4j+Uungc3Ik0UHreXMFNm3iBjj+sBv
9HUJBWTbusny1dVAdR6wn180U3EX9wUEi4WlWEWnq970GYWCh4/q1zxeRwttbbYMRtCX/NeMzOFJh0Bx

q+wj91zeoE9JC6aYCcnthNO119qGh5urfAt8YN5YZWL3HyjNKBA2NwbicY3WIi08GL17lRCH1ZVc+CBg

YHntL1WHrI+EBw4FMH0Rblv8G3m1hXWyuTCkA9XqNy
96HUFZBXxmCtCVqpSVyGTK7spXfAq+GOdowJQe4pOFCBVcocvlCugCA7Gz1muj3tYjqAy9mqF3M/fuVi

mJxS7B5gV1Nu7gWDchwmpvfVXVmrxpF+mBEUVGFqgvBX9RdD1I5KM51esiWv/yTYFUkRLc1aEyf+Amwv

2s6d7ZgWTGUlL2yiFb5Tm24vu6IgxJumkQ6CIYkUgB
ksT5+F9ndHWtEMzEMbb1XKNhSglQm636OXYwApWpcC2wn1bX5rBQAV7YkWql63SqUjO5PAhOTa7vMNDd

Qtg6UgsM2W3a9NwEkuAVk0/JX8idH+VeP8o/1oAJcIUcHTXtuUOVp6QWSCuEAEnTL/aTiaYRraLn1k1h

SryIjm0THbCgabjaGkKx+ho4dvhkBB+Pe2nI/dm6N7
8FqZXhahgcB+8J8jaAaBnyeyXu0YuBatryGzWks0G6Ar2jAIK79n+8dQzismypsc068ntnpIAlI8QmG5

4VyQ1Mz4DaYbyO9nexVFiojopSGq7UzPYV70T/9D+yVV5eHmEj/jA43p+PbuMKcrMDxpflnhTNO24SIc

2qM/GPKIAqhs0b+zG3OhqIvpdMig7mtwlFh7sSBYdz
chyEQ8y33jDVPPbHgzWcVKv5VgIBJq6OsQ1Td54XUbz26Ohj+Ui9HKg1r/sR9msFQGUIDs+SXd+oT+7T

rs+dlxWubV3jh8Q9dObk8a6+I51ReUMwGp+bsYa7cfwNlaXAvTNp2S58vjwHMremrgodq7Ns1XNFF+4A

T5aRM+h8Z2f+8sb5gFbWFUw0Frm+gUAHEKqvm6gCZp
k4LuP6d0zBnty51S+vPc5YNBxCs+IPOQNAwylHRIjUkhRjQwSomrdXsfgCAEezctLf54Hm3wWKCIEg2B

f0w8nL6vejzIoRhTa6Rx85TYxbPVga0N3sYgpybN6FKMzxT0z3lglP4dMYGGhLzsl6GnB9Xgc6NFN/9E

R2VVzJcqJLZBw6LiwqtxEyBqYQ8FMyTLY7UljrTOuK
MidLm7ehRw6FqFgimGMWB6FlWuJ2EjuFH9jKX672JtJK2Ue8FbWd0rntBt+8t9csqfXSqCK036V+tVgr

ntxNdxbaimVhse5UmlzF/Nj6yyjCnc0oA3bS0Djz9LGp1hvv8vd1RlyFjhFXMkOgkRgzMV3aetfWUXpk

YU1h2uqsfdnYW/VSGgr9l21zdSB8EpbyrGwAZHJ1Yi
0D9xaVD33Rd4PC3uzimcZHzvUQY4gfT+lsBzbUWyzdkshTakQKyK/5nS/70Mcn3WxKQqv5G60rEOF1We

CDGFWsR2O8RAsLK+OEuzTAkn/zx+xGegKkiy6o7Ih8uMWd5MgUvxwjtQGMGnaJlMagOU2HwU2yIRv0a4

jaxLr6bwXVW6TckxDTfVY9+0mIOyPh8RD1jz4X6uJf
Vgsea/dMbv3tIPRCXx8aFXlNuGlrAYPA7Youb0QKEMSGZRjmWw6Lqh3RPp/Xyfmo17itHguo3q60ftir

F3SSbKzCL1XG7AzZWeYAQMSgS7AEWTv71iUaaAIvIGj2N72lzdcizBD6QWAsf4JiM8h4MHjOP9nimk/w

7e8rxo/DEcCWVl0Og4U0ZTkmngO4S0dIkqOPiHFhRl
6PY3+tqsvHtmnwbGmZxoLiwmGvb3yxF3Mj2ByDzwsetuaC+qc2gF4C4spQa8nIAxxNZ3y0FiZ+dz1irN

zy1nlfj2GWFCYSUWTdV/yzYmsal+iCiljAK6TT7Mh4etdw77we3g3EosjIb32pdSW4LxCWO+9xT5/RSO

swAZgVvKz1g3OqGDhvOqvnqzDzVkHFggUeOzHXBYFK
x2vJCr/YIR1+N1RkcV6IUVuvJg2HCGEfi3V2/UbmSUuys+AF4Jbsfdyme1EyHCgjbcvpSufLxrHyhD/P

NarKa/R5j+d+BaGGR0tQWcE0cjeX65ONKLbk9Ybu2VASxwbrpeGH3by3oXCo/vgtlzV8ouQAPiU8B4LT

8RlNuk9oor6XQzAD6XqFU3vLklYCFaklQNgGdXME55
/kkmCPzX9Yw73V1oK4inEfbEkj/irA90QAwVgKjV2ZzIc4bG3bBpL+fqGabB17/Yx4hvu1cKxmuiFmbJ

CXHWRy9GOxP0QQxVjhqVuJlgv2IV22D/dyaqFjZnnrj+p0gaJEs+2bbrIYHaTTkK19k4BbQF48DkKwzf

obK+Jb24rlB1BNxeaPdl5vpA2Ne2U+06L08FxB4ujn
zi3KhqpPqZ+Z1a8wnIH+gfyxZdepoi9/XyWqohQgoixRTM6UwpJh1QyPdxsqW63JikYwEqK6G0eFpHV4

e1Vr/xGWli3SaTcosYbCKv88sbiUhyk8lixAdxJPMFbLWxKUf3HOuhWJl4jzJ0+IAXRBAAcEruo32w3K

Ds83AIFJHAlyB2FkJUq4YvWruVIpCGpilfbqGsFAZQ
eFmscdr38kmcDGIH+i9ePtUmTdtQaRj96c0/qnNi3R1mPnebm8giycCN5jBe9Dm+vjN7C+0b1lFA3Oml

a4q6tKtlWpy8J1tL/FqCvYi5v5/xyxk00YTQqOza53YB8M+0sF/A00AGGwt7EEK3/bKsB31+LU0EB6nY

TW58DsbFSB/7epS6Id3LmB7LkAcde5Zq4l9G0M7R63
IENZBvsZnRBJ2/J4KD7pV5a5A7/yVO3Z+ZNO5A34vCBh6WtQk5YYRF7gKp1hX8HWldOis0Hw2+C3GgAr

GfejEv+eFDfuGXyLCAy2Ql68plvcDtaM7RarxjmeVmIvhW79YbgYXTDyJABA70Mp5GXQWhnEFP4xs35W

NPZ/x7Qdt2daCb1x+a1hz+AutcOhX+9wC+thTsKbtr
A3IZ0BXk9ktud8tOi+ZgG0aMU5waZL+6axGRQbKDhG/79wNcIACyZ5P9yjzd2nCSb7YZ8NjZtnblqo5T

LMS8ag1xJjoboTAhTAFzV17I/W0q4zlqvQhhEcPlitnPCp5TcKvM+OJyBMB3EAHavyJTCxq4F8/lSfGU

N94Nly5HhzoqPx99tChkgg//A5ogcL0DrdRRrA99Hs
KS61XTjEMWHs7Bcu4rUKcPuCUv8sWn7MW7besgNrHEsvzgVwGCXv+0pLyvoi8xdvPrC6rKQJvkT1qmf0

bn2HyBZ3w1gGlDSY2ayhLMRkPmkESgJzbky1ydeyyZ260xxGSIKkvX6hUxrIqOPa4OhrhkCI7sbNb/pe

2mCdRKPZojM83LcqXyVaBP3EHTPAh5TmYSnx5GciBB
RZNB1XApct/zma30us3IoX32fu+qpC4VSdGlPv1d8PZRGz0Y0SkKuil6yTkYSBIFiAL46GT6RRf5TSRq

Jw63jy/yG5oo6D+Tq8QJvkKOgOvRHBg/uB4DCCh3/Ho3iBfOSUYPVLUcbm8FEYz8ttg3UNegMZxpOJMl

AR+vnj4de5+RkFQVdYHe64HVQTPA9QIHHxoahtBQMM
Gcw1aFWQF+IzE/FJayJP7WOX/Vuk4yyWMUzbQ015O4ezlpbXsdrbniHxQJEaHYdomPT4nQOwuXEUO+jr

Brrh/fUna511x+8SpaXsUTiqN9NMQ9zfj7FNNXczUwMYbLwtvZyK/I5yr4q15ln3p/PIAhq6815+GzZ9

ELrq5lbhm6/VBwC1MLiE/2dtYUTAT5q7kq19M3h4iQ
iHQyb53a/2GUFVkTzb/IXK2iHUV76VkQ6YgHy4uZ1V2xncxlxyrTrXvhz+2Gq1KhxGD0CT08pYHvTh8M

s9QlcrN2oSMkWBvw0XCH7PQgqN7T+oohiB7iUlYLjdLKnNLNO/h52d+LdR9xYRY/n6q+fdW+x2ZArRws

6e+r63qFcGSV1wXmKRBqhajXGJOR9V2UXIHuPUna6v
wuFk6HmQzI/ugOrAeJawC2kUio9/DIMcnITQIegG/gmscMa4vzfwzyLrZV/pPMzggZHzlC9PB5Hm7I65

Qcd/CG/+aIIpdq7YKhUVnlFgJy2gnPQe0iWZiTHJe8g8R/6xTI26cv+p/Iayc3fwj9v0Mg53bHJTEV2U

cb/8L6R3RzV6xVOQQeO5E8KN0kaYHaA1jI0+Zv3yww
bsF2x799uGCVZeapaZisj7Wrke2E/Ci+cQ7lftrcgP233n3VC+Ni5hvOlO+Z0b0rKW8kx9SV8EcJigt0

y8Z6hUNbvS+nXZmvJt1is7e2luIAlGh34+FCNrEHqd/DyGJIZwmTWgC2c2B2cXEd/k3QY61PlD13Bnxn

vCdcIDybmQ56V+uRhBcvSa1Pc32tcT14Cx9YEiwuIY
i0N8d6hxNMHVsl4Z02agYD/tJyr+S/1ymBCklUe5pBDgPSOmCSQxDSi7+a+LqJHAiebBR+ciu+drqw0Q

Edward/W234HJ9LhzuYqYxv+G5Xa5N0kcIGjNXI9yY1wu9UezRpTBFSmli01MylmCR+02jqjHFkIn/N2VFu0

NYi0qsDlIjHmliU1lH4CCIJ4ujJf8pmbtzu6zrg5Uu
fIwWUKp0nhbWYikXHfXzaz6mChrYpqwifl7Y9s+SAasJYnmOy/Uevkl+wzwz9Ooq2caERcg9THepQAgH

y60Lv3NCzM4REQOXAKL0vrOgvn9XItebKfJ/lwU9s42xphjql5GWtKaLuJeSmWxuJ/VQeZkqWH/tAgNz

Q401GKASxOHXQ8Dvt7mGqirtRTLKXYZ7akXhjCDJgA
ei3crnBOsVLWz45KWdhpMpwOGVe0SkfnVmLWo6Y2N/PIxmTmE1D1vUiSV4tGZEsU3tuwbWmkY4BC354B

kD348j72UB5rOV+SUY3ngRYyhARCqoTqCEoz+MH7/xoNfxAB1orfi1zzy3W2ilqt6o5U0NTwIi9yKfxb

MWC2abZml/KaBVEOYnCJdnBpVN/9G7hz6A0Nqs0nC6
JB7bjSv7QVDrYtpYQ+fB0bYlNyA810IJPtrHVrQ2LNj4sq7hye8IjtLLfpowg5uyX6PWjZNvj1om6qeQ

ItKOJg3XlMkJlatWaMjffEeL1TDR+UKM87pgjtytmhKrsXSW9EME2onYoHE3yF43/bFfFqj02/KFTRar

D9nDIDX42uZq7ond2TcJ+u4FfE2LtX2fFmn/z7yR5P
93jkIxx6QhBtLKbi2yjbd7JeunnFwMkQmFvJiN2lZadiPuYhPVERsgn6hPe4NJ8d3idTqSZ9N8pe1bKB

lKVZX5pRO2mJe2ZFcIwoCEaZpDSNGIe7+w6VyCjRz0YDNHoMzMDUDUC4Al7cxuFOg3S5oe7WzhFHU6in

HY9Vm8kX21Jt0o3bZsB9GZp9Z85dHBm9J3LuYu7V17
gvJai9OMjM7ydJBqRm87rRWqCq2GMxwgsdeoBPwmiFeLggx/zyChPcXaxH9WY2swkzhPMIT1a8yyzC0A

Ty3CCb3hqD81I7KNH7rE8U7i5r+ehNlU+q5qv5c4F/nulipHyxmu9VwSFA9RG7cIMyduTLJqEPohaVL3

GpnGy0eS+VgF+TvX/v4RXKLs9kVVRKHR+GAohSLCXu
sh9CwoOGMvj620A9FfNn6Sfd/z9QJeFcEe0lbzS65hWn35w2oQD4domBaFq39+BmM2fFnbfm5taEbSEe

K58WoLrGIk0wfkuFiWNDLB+BqpAJ+agnq/6ZnIWC0atKLyyOe7YCru66DXIB/kPAfjPZ5oJKR4sthIju

FK/4qvGTyeobbBUkeoe+M7Cx0ARKK7JGxST2VNhN2T
L/Oul+Ll3mdU/l2TWFEacdpYQNpc85emPCnTd8lJWWWTXbcpmupg5bnoR8ws4LhzWz/Yr2fFwtto7Psj

Yo5hsrKOwg1J6SsY1KWC+ME+uNnYVKV/Yk8fIE1DReIMAWXfHlbEn8+eFPG4Qq3Uxqb7TU1mXEnuTGJo

lj8yeAtrMZXuiXR6g/cX0PRj1yseJ4pBAPX0K65v7k
B59Mwg8ovITxPbu4VbtS+PfCgmutnUu99LBitKloos8n2DNUoLE5QZ+0rTWg+HFBwZQNZQ2uEX9IvC6A

nUtFusxMqeaQo/O82w0is03D8QanSvdFUE0V4H92f1uQ7+GFY9Rhvf2nIVwBv/DXhY7mFL46+yA7S7lC

mkZdBFTHTU4oYn5XjUtOr/mbwVyVF3zsDNvyTAc4Di
nsT8bGi8sQXJXQi631L5OhxQId+vMAjOF9ShRBoSagzG2Q3cNds9sXuM2SwOJ/XjlSBIoonVJStpArr7

ZVFA9A+UXZI+epraPTnUYf1Acn55IqycVaH/5iC86JJzpK9/lBmX3+/aG9bmo7lTSatg9OJ1AhZXqhcw

6bG1K5NgO/9UCJqO1278LAJZg7fZAbZh0JgZkeFYts
9NKmavjM9RRuO2NmAsHtXyQCPdsu4jP348gAqVv+5EdQMQV+cCvw6Qvk8xftirBzoE12ycKksUqsp1E8

yd5nrIsJVzicbuvzSpfev7VhPep3iN4ps9LwzAHBxo9KaQYYfAvuDM0LUeU9DdUwwqlTmim7ByRQcQuA

TUJqhYVaW0HglUg6u72lIi7VzSqNfj04HnduWzpWW+
HmlIGslySJjljl09/6eRAIB5so8zrJl+5NljF4cTtwTMFfheGZLNGbdl0i7vDzhjmuf3NATYC5AGG8KZ

MG2cJ4sy5hqEAQO3WBSpY23eq35BfdDcNe9VXM0C9eahPyf9Hv4utHbkWQzMn8zikBw+qE++9Z5w/jUC

NB3BEgwShW7mFU/lkrn/f+3yGF/9/ZfKkj34NGunAS
lISi7vjGaPbMppmDG+sP73N738H7LNTrpmgWPpFbqolX6hIf0FomCXItGUEEhmxTgA3oFgKdVdVeUT+V

8Wn3++Nb/Srg1NqFJKZtVOTgg72oRdD0Fu9hbLkcwiUa2yGsvwbJtr8pUUePn61AgCYqgi3uISz+Z+YH

SwJTSmnT8ZGW2r378CisRuzqiaRR0UJB6YY2BjZGGJ
luzXl0n49l8miviWylm0y/RNoJVnZW8a18iuaZh/g6IC6j9dxee23fCwSfJRShXmw/ex/toC0ex9R6bD

7KQFkmrL3mN+YiDkQG837DAU09lDYKH0hClbvPW4sX726Ow97LfJ7/AzPQI4a9OHHF0KI+25IFGWKe/q

5rmpEjCsUyLDGv9Qh2L5S5RoHl9eBvjM47bi9+i0VF
4khQTboM2fz3yggLTY8iACMARoU0GpMayEmR9GrHoIa05mw8pXkcCsS122QhtTfxJo6L23tlCdCz0OQd

/bi1/jk2ueLDK3TiqtrBu5u9s15jcC938+hmA/Xbl1eA5NSCVFuDIC0rMj7lV5yB+DU+2ybw//Zoran+k

odD3JT5cR0MpANKxfSjLDwCOx1MfUccHcXG5Ij80+d
4M7q5oNfVNpAop/x6vz8XVbeNJA6F41+P808j0YySvcvCqYTnosLrFDzDVjDtKyAzPxhofx6Tswfz+mQ

RFU6g4XQvJb7bBBTwU7Jz1k4UrS3r6dowgFjs6TuBc303kplMahg1hWLORuSdB0DqV/OEIdrFcXmuXpT

PobBu+CMAu3OHxK8SlKoPrF2DLbmSnL+qLQ2r6Hzgz
to1ItZiVEDkBGMP3Y8iY9AAs0bwHMvg9F0wN7ZzU5pEAkLDgWhbcULFcPKgQqr9B3Okd+t8DMAUyRmrU

TkinSb0YjOQj0NlxJBnWoEmUkStqz/KbHESSISQnZEMHamVhcn1ulaat1hoC9f8y+MBcjnPH3EKQhfng

GNO6ibA1bwF+81t82J0xNqJwXb+BnVEz4AaTdDBrQT
VmaYIlNElsvZ513+BdMvJgvkm5hLUJkGowbIntMSM/ehVCAo4lXIA2GjjZyfSABsS4wEiqyva/YbC2ZC

rR9vdjuexG5QV904VYIIwJb+r7FD/ug4vOFsgYOeJ5HoA/OSyY3Yiy2M1R/0t4KuDZ3+wMuoTfV4gR5l

/LgUyG9NGRZtdpyWkVQ1e1C11ixetmcp+C7b3Gkdp4
JiyB4ca4N8GAizvabnGgIaZkFbr0xf8N2sqjpEghld+SwqCXNpVysK51LHMt+M57+ZmxR0qLCPd+x/KR

wIe4l1FhhNs5bsR/hgaKnPQxdEfgrXPm78LPGatvFFY3VFqO2R0a9cyh3coPz+rcjqPorwwywzAK9GmD

528PIH6icR3S3PXKPTHBDrukxb62cTU894eNt+QzPp
GtCbOAm4rN1dHs0Ie9+M0iIRw6q7kN3/qoUwo9EaHswE+8namW8RQ0wgOYkAsF4d6w3b2TiiUc/7Ra3m

SJFR6H9jQMOe3xQ8Y8skh1I4t2j68gtmUu4p4y07tzx7PQs/+L9i/av2j/ov2L9i/av2j/ov2L9i/av2

j/ov2L9i/a/lT4w6ETx7R/NkcXY7MmskG3y4Q1KpXg
pZZvfvZxODdOkH7/Mp6ZvncZPSSV6q4bp7S1idiL9Qulj753wk5WnJBi/0c8tTsczIOpTwTsGXB9mHy+

sWXdw8BfNAh8YZT95QoW/1ei5WbZOlJ5K3p/Bf0r7O+v41P5iXVPjF61yQJzM1AxC3Sns6nlUHsls/s6

SpbATJUGlrWnaBYWZgDm2keLsSoYAu/Xwg6VIYUwMF
LoFA0cK/w7QO0FUPgyozvLeZRvHCw01tL/zjFA5a21XCv/j6bYtGd+gJ1k1uoNUu1C9yusyY/mCVRW+9

YL6pQGWC37TEFo3llOp/rU4j0K+05TqvcecLF/Dyg0+mjtkT3Fj4Mc0vc4smqE+rNI4/wRA9Rwp6BY1z

U6zb8DU2dni2QsOXU6HKr6QNAXbjh6otC/5TkRRdNl
Ubc9lgcOnTY0AvABeBJDkjYGml9SMyPOvGw2izBBXCuCTFMK9jAuGOZMJauYGMGRbElU0QFQ/r0FOpVI

HCFDaWAHHpGuPO88fziUHTic5KOm51blDN6GvQ6dLSeD/LCsjo31Od/SuWrQU/BSd9ltlY7gCdRLM3Y7

h8pVzsL1k0tE3ecjegjD0BwZ9JxujcMRz1lfmJc+Vo
r3jp3e1CylzrUFFS/CLwiNDs/QN2jmOU333qy7Kk3Pe2NJrppOZlBM03S8jsLWXRujbxH4H9YKgPmuPy

cSOfj7Zq3e9wjlO+07HllJhp8tceyTIQdodv/lL/aVgx4ewT8pHRKJ6NcEC0go8tQ02f4twMFj4VbMwh

5Y8u8GlqM4KZ89i3XDnN2xSAonJqZ+sXpNpF+BCuGs
IjLamZJ2KlG7hAqavjNt/Yq5wnUPDRAYLIPdod7RK+f8PehfR5uz1doBnM5aNoUecpa2i78gdmFw/ccQ

HYrfGE2sHFshBUY3yIsKwKI+lc4wz0qPEDeoVXJrgAe+phzGgjiOqp4Sur+bVl1ivQx7rqF6Tc3E8YZO

l6gsDNBQPnymPoSIo6E1QagAaHVyNyt69IDGvW6RgQ
rwssU08zhnBM84H5zKL8HDK9njXYZ7KcIkO9lSxwxlgZOl7VBwk1B6aBpY49cwDehs7ETs24ektax10q

FXAJiU05Uieou3o0yxZ3P2gQdyCecuRShB9igAimHjtvEf7Qbrs0loTn2NUTTGVUpLbvnYbB8auCwCtG

YTEdecvp3y/hX3aNogQwumpP2TKJHDfKG+EVjy3hBt
962bkkNj5fmcQizR+Kx85tiyPXSWQS7o5sTNHxwCpOcyFBsWET8xgpMTTGtDUnVtoIP54Ii+jUVFlnMb

7xxsNgj7Fisx0MvuB6N+iFUEU4Kar9jR89V20fxngcgiOwwhi5a22QL4/ZTXWy7ANedE7f38BcbYpf+W

+vOX+zm/HRzPIX23yTh2ijf0+RtQSA4CB/8t0rYDk8
1VdxeXQYZFlBYlumOTbi8VnDeIxIHDovH6sHRbTfzi9EIgP58JXwHRpY1VBxtrnQefe/oGSIJlHy13SV

7gWoN5Tjj7NaftnQKMjiGs4muwbS9jW60raojp1fHRRsRGMTiEUqMIVCbpoEAYe/DjLF7XiGYRSc5wti

IrZsElPdRbMK07w551a/UeSrA4DREsLFdSDjJCOLp4
NS1LjmorW0uSh517natgNe7gGa/bNRt96uMlZQTG0M0C3ziL7A0vr+rWcSz+CRkJSeD+6G0etcW4LAjI

cq8brpCo5gB/8pr0gHJ0ptC9VmqhFl8Rsz1kl7foksV3SY4CzXmOB/kJppuR/5WapMkO0Rs3hj73swlf

Lyw1yfwOWR7x4+4s08GQO9h73ebwDK/mrBk2fW8Igg
ThNmzBGb+EG28LtovErbUgzoKwlYVlMBdtoGjzU8N6N1bZ7h1p5D/Wk9txEwWXnbLSlJ5y6qR2wfwVow

sgR6Pj9oiTCyvzFJzHu9JTBIbv+v5LbVdm1pnDokua/6qe7ViVPz5XX3Y4VosvO5ZTvPSN+VXH+2zLTz

rQYtr0zA9+nQr5mQkT1ETx0jiXEpJm5jj6DNr/Ojmz
+SgM2pLJHmX8Cvk654jWDKv9LxoYNuUDd9EIoHp2m/fBAkJ0j1MTZsR6985AVoyEZuhxuTCZ91RrTeuo

2ccJqHkD0mV1s2YHOhLSEirknLovOgu4n7E2ffYjSf2oihdAKZHBlcf3Ai50FMXL9j05M6n3loRBtXwX

Hwq1Np6pHsg6vL3Lr1UBsp1EEJHlYykhCa9U57lRD3
RrOucQ9MxD7UDT+5J0aXQ0dwI7sEg53RtW64BvOxeS8SMU8BHjo44/clb0zNX/CfnKvGpwPlGwCerWYu

TcOfiUE83JM+T9lDJuMCrtmEFDlBKE2kk/JI7LrwD8KCE2ZrKYmet6Pbs7SY+scO0jAH3kHcc5kedw8q

8UCZzvCZ956ddE3sg3Ucer8A7b+To6UJ8gHz8Wdhe7
RM/2CiGx1cdQnCZ+m9OTGf3YQqYCco8lmWrhz+HpvmMP/mNG3w5FaQvq3SJxNai73lXATrDAiyK6+SSP

PROXk82ymYhv8FOpnsyWOmctHIXn3f15R43GSbntKXkixVl7RTqhgMbYhDhWqIUQdkZQ9rNd8Q0+5mAa

zDknLP6BN1rW433lA/NrDiT0MOx56s3X+5kgamwkK/
0p525kJ4U6C3bZEl5ZU8SJnyXXaZx5kiDwnrUanmHuoC7aMNu79JWguTiMiM31asmID4mUXhugLjpY7c

7ojP1wrhJl9og009q38hfTCV4E6n9XbQgORDr1KAazwEo6TA+bi61hsUVCV3DM+09x7wTlwW43T4OD/2

u3tfPg02lQorlyIxczspxyd+HUi6ZIUPmOhmPdeovv
sZcDOFNKnb4+Bf2iP68AMY1dBiMDHROx83bRhegieyN4y1XW29mX5L3hnKejVMRKYmGX8dzJ4tvqLO62

jrFXkfB9x0XCwKEpZKZQi/vuEXcdul926+GCovFHBnVPSkAQgIX7X2Ec5+Df8wnGLS6vhwrXhhoJV31B

9lCKGWoy5FiPWnTUtiS3k5Uw1t8kEmGer/P+0N6MNJ
+peMyGqJQGNSJoRzcGKm1iyB1stmEAZ65XUMY8AJSUwFvr9EgQqnP0Pb64BVxENubEurb2px1pu/7Oui

Omux+YAbHA4osebbNX/xeBinr4tNuAT3WM3OJOMohzxaet1y9QjdztgoiUAz6sn5y1jvBDBo1z3bCUJ/

TqeSCNnVHhkuF5uz/Qm3D1xtiJVonE/EZgDgpBMBtL
RWHuX81hJvkK8DTmU3AfDkTrIPb7C8rKv/6AKcY1d/ce0HZDcO3hB2H08ZdsH4U/HcjvR/2Ec4EyD6M7

IPkugQBxuZDCPK/fliF4WyRg8HtiVh2lnk7sQUmz9HAawFxrEqUPRPBcbRkgTbvZaRCtVVu97CuPYdyX

mgDXk9+KRGEqVlLbxR/AP7k/8uNsMk8ZUFGz5iHsBU
3Yx+s7GuZMr/Km3GNH3KN/E4liKh4odPOCUpJ11D2D3/lxShn09K317LHioZAXl0iG4QQYUc5l630L/4

lOBy4m3TTIeUvx0as75n85iG0OJaKBLW+pQ1V+f8kCkwTZTpqU/fDt1kOX+v1hviEIj1YtTWZS4NdEBh

SEhrn+EOC0YuRSqOyrYfvHUApj1D0hkUfogX4rMpHL
VbCSka+dKPLsTuRBsDmuVvPsZLWFhsgfJNjbGcryzFwP4g3kH80r36a7E6hNYl3CCuxhCoSoiR8gyof7

/quR1szEh/28+opjOmZmbAhlGsNO4lzmXzcAmosNO6UygtNXLBdYnsKcL9jiytdsal6Uy+dlK3+LcUpe

w6MNRkm5xRYbCHMCVU+RjyLtMRQhMR3pCWdZuYd8Gu
X3tHvUtK5zjkLDSa1FWL7f2lffSNrN8fyQ+I2x7bpUueMGb1pAxU1xNuvNsVW+m+Z1ZEOSJwXAfu+TZQ

qze9wHTQ2L2ZiodrDlf7/rfkiNvluzLHrrUE5GoBaUjSntRvORQTacaDeANdFn9tjQsUDg92B5CuxHhC

jVL7DlC4r2wC5K3CBKP2nOhA2qjVU9rbDqDqU8jgel
HbzjSnkgFa+5NGq2ai9zzPbYtyPVTB2TIFIBCyJ9aNUHYGHo6ij53L3QDyy9wNoeKxB/FB0SGdBkuUqb

hk+K3MQ/vc3mc19+IAfmlulizQ36ucXImihjhPas5FB7a5TvqBsEA35l99uvx1gfYvwzT/9eBbNqpygl

QqxgiDDLGxf7DpsEFjry1ECO+r6r04BUcY1el0awWb
qWpywzdPLHHzF5sAy5v4AKWJoa0OcriULMysF8vCgtM7WuIH8YipM//Oacc5/u470z/9i9zZx1vWBT40

ex1unBqNXHo01+VGbtCoZM4pihOTKDkdqLSFADHUfhanznUv54J7Tk2hwhRx62+aVh7gUBhox0Sy3t/I

RnDl7noqdBrucrzt9z4pmBEtN4OZ5zvgEmaVeI3ApQ
9+m3uqptaEL2Y57XzK4nSP0vj87TH3N1YmlyC2W1RpgCbW7gLXNDqYzfsdEBNI8O/zyOBT4ylyQCrdrE

Rg46vlsKt5aQMxujdEUP3MzaH8qRAopWXWDl3dPooBgPbVK35LdO3rRZkrVCJ2sj2WSs9F5RKw9ggMv0

XsAkFcm1l4WK1xU/KYZjf50EOH9/LQ1CYDu6wZWWyD
6/IEJenPrDSZwXr9KeA+PdQvRnFmHYvnGLWrF/gY/h1VrdcmD40zeLlt0lkkFOsU1ajNcMbIvyQp/KfQ

U+XATACdJ+UdFE+n7tUeMlH2hOzzGVH869zq9gp28snAs1eUtcoNNX8LEmtSmPhQyr8Pg9EXlFA2u7+k

vDv5mcGOBqLHjnFAiHSp91/F23/Y2FhDDGlOaK/eaE
3k0IZlLXO6rY54oX0hAZH9yfJcCPrVP/sLNf73ZjlRvcMdAlg55Vt01qoybMWNuCafEc7TMT4FFSXK07

bg3dm2oiX2HwO5TrndDQBD1iB0gSlzay1QxMe2UKl6O2SarVFM2xyYQvQaB8Aejs5UCYP99CizIUSscD

D5nVmz42ZyExj+IpoUYVWHW9Ud8hNntsVlEYYjcoiO
QLjrvfTF8Z5Zuy6cKUi+pukI6VNYqiZ882jr8CArFF8KQEyxYH4hnujXZC7PMJZ52UycC4AW7VBl7JW+

tk2HgRFLlIBt+ZZLXp/uAdh78Svmr3nYXMo6guLjTXrQ04VyAAaUj4FGggyxjTxC8rzQWy6SF6VhLUk9

bxsZ0YOm4mil40bnK4ZYfTszHYr23v8Vk3gBYFNSc+
nWw7f2TocIWqM71LAUYJ/6Z9FmDrBntCZoQ6RzwarMHCoExUdWF5B6bD9mKGuDtu3K+HVxHylRelTIvv

u5YksAnofEqEmA2JhA8kBPPpdA7UFC8cXj6opM2F1Y4Mt9m0oq5seZkJyETaOzRg1lzFKfswK62RNA1C

v1vpRp6BvHojIku/4cTKkPDLoixSpF6JyMucV79+Xg
9FZ2ubBARKdu9wkoU0CFaX0wimhVtE7ZZ3wnGgeGm1YDQanKxxY/OXfzjahxwK/fcUviAzuNtL+r7nBe

jIAdYTodeDzBFNjPEXg/zRYdZ3M5b78rafKT+uzFyWF7C45jlKHaEWpd+PoUmo2r2wt8O97VofejtMj3

kVx1XD5stENnrMtchYnQdF5f0GHYau01Z4cvy1lCtp
1o14dWahkcRNss8x6dbP1Wxf1y10f4cbB+Y7Q/d7OOEXJXJd5BuRU4I0Nd3guF1Fvx3lvee7QebtKQps

8cRK1YYpe08ai6AFNPKozTy6gFcWdAcnTdg1syYcEYSg/UhIKzw+JOIVSQMlCK08fGvff9lPdb1ymOyJ

D1/ydKOnXFXdekq+yLJqdlCNNCjicylh04FE0tkm9/
/DuCNoj2J0NfpLK3+FYdtQSti1PLj5R20prCm/6aTQsQpmY6rb9p6dCA+7YQR3hYzVU1NJuLRNOJNmLK

DbB3PORylhH3Gm9zrVCGr+/+rfFaXKq+/50fi/GA40K6Aj8f3R/zLbFfZgvRkI5/RbRpWPgcmkHXrsFb

5KZowERphCWuQpH1z2E/89w4dHZ30jpSjELR3jc8zq
5ThEhuYWmtz7cWfuLidYMsSjrSfqpTxv3BcbkYPzrKZkD2PQ7kDJK3SsR9Sqh3aB1s9TIKpKYMn5wkr1

3nBbDf3ql0/dNFrap+jslo54MbroXdFX/JEkovV4fmv7p5PhQwrkA4hsit5aHOJBs5hGK53uzeQmJuTN

TGwRNnDketEOStaHRCsk4l5n/e8ECTj0/UgCMf90nm
MnsPLvRjg4+PzBqzEso82wPBQirxiaD1T9op7QdSpAwYp4/mr/28/yaCkFdLWOVdKDDF5tVo71gYPIvJ

JcXDReh4YI1aV2WSsElNRITIqR0m8c8Xs/9+t1gy4NbXdN/IfacC1K6cmuWbhIUlznfcc3LkhA50R7/l

A3sm4VOZfWo5IugdkntGBipwvlANFO0tAQPWeIl3Ay
gyBRmeBRrv84W3G4rIi02b1p+brK0SpIpeemQmq4Pn06VBT172uXkBVWEZtOO3k2Yh3dIqhRTYLq4eW+

ynTRKjq/Y3Vj7bG+KEcxwNWviQwfUAtMUWmTZ96Kb6bT5+FBCQg4pvNagHATkfvzak1bk2x0TT6kOxhq

tFHvB8DqfLqhhRHqD+rRXzouBlh2q51DW2g00zJf+k
cqosyhDcansaA8aE9AY7edjjYdGB3zmRaLF/pGSKO9cOnzFtib5moufN/3qvH5pv3HHOyFqQS7jFG2q8

cAoZ/2t4sIPcIqnvB1mghe7S2mR+K9rluE4Qf6EpDbaX8C2NoK71eh3hFP+prrzbiNEZuFTtyoKCYKJC

VSx3hgDdLripm2iH6BCmMAW45eEmk/20x17lMB2kYT
jUgXtNrORqL3r3jacCsJOVQC3WomeqQlSRjaMM+Ef68ehcCSQlhpzM4xER13ZgT0/a7f4TsfedjvIh6G

b82W8zBVa5rSoxWzVoZOfPQtLzN0ZS/D488KUUMJd5RCbs2lYJHYwIbT/N7/Hzv3SIINHHHQIRKAP8i0

Yz9ykONCjOpgyfoOVIsmOFgQCs6dTUPiNoM1sYy0vl
5lkxoT5J6+nWvQ+/r0qi4iIyNdW6tghotAiypuMGvX8YQfL7xD++6l9Y64cmUUEUPbq1GzTQMvIjT3/w

3GdQeprc/BE4f7yOerRftOJVpl0o+N69oRFVrFdHeUXzsNdgXr9jwAcHvk8/fP4b12vDcfpY9jSr5tS0

4cc+XjHXgtcZijIaHoc+cmyfNxKi8irwj+bjs/XWVm
vI9p44ZT9SwBkjsgAdxAw4zCsel8MiIANOWt95SLwDJ3pN313qG8vr789/6NWIRSrAFCZ8X1no0YpJAG

sCYNsXwAMPhH+Z+aFofjH6yp2CDQdXjMOVUM2pD6Znh+kiK2RA4D8rxzgBWTy/1rd8dSVviXw0oeM0KX

bjDYccpJkHK9oACLaSBOvOL4PQl0ZfETaOt/KiCbvO
aOIXKdO2nWrX6WYWgbNZy3uUl4R3v7/t22GBdW9JqpbzMzDeIUEktwM0CApyqzWC+Qhh3x9sr5tR62Mv

BywOcrGnlQes4R65pEcO4wgu2bYgvLyGhk32uf69/z8UrcfWfPidzKxqP7b0ytYRcMrjgA4bGW4hNVRk

ZURMN0TaKH20sjIJqekD5PCyAkhRKvfWd/TZN8hLc4
BfJFD1yi2pL28pyKXTo54CgU0LDgJ0Gns7QlHSHhE+eiUq2op/r2Db+dVImLICUkslhTCOr/EG7ba/Xr

K211K5Nc6mJjYP/btvL2VCEqdSsc/CcVY51irhLwTyfeSZ0HXycSPaQM6jdkuYLMJjCPIOWTawoxqCPl

E8Q02Aao7Hicwot7ndg3W3hgOF+K0/spbKuU/K5ELC
2vA3zPqL7I2bsvUjeGD/zL9I3QPl4cZkUI8UVz7RH50DyMNfR3QNiKk/S3lQUjG4FZYOMVztSDjs7b47

iyjvGfGrC8Kdwf6qu32ur8ay8fgKj3f+eFq+sEEXN1fTbyYr4xuRhSI+++1JoE1RCbgBVekD39Naz1+4

SvG4D7XluP7UuzxSGNYVgcorL+GElxlKQy/3Eb5uDO
688aOV0pON7waLjYjo01NFLhJM7iGhCijuEkDwR2vtZa2wTzE7Qq+oVS35dyOI0oeN2///pBAd0MYbGJ

LpBK30pboFN3coxXU7/xVDYt/LSVReGCFdNKXj7dr2Na2MrYzzAwhHmY0QV53G0az4Zg2EN2beAjZR/E

kOMJuiZc7Z/itpytUKnJw7KOYyCg33UXEJFYWMP11W
JVYFjPPfuUqz+Mnw2SbOxyLZKyeiQYk6TitLlu47m0j+nkH96ZFHQ2IybDm+UpCGfODA/R86rCE/uEQB

n39RLLQC7uVXQ/jo6qsNCzeq6kNmntnA2/aMg4G4bfiLukFeafO046tuH85IOcPsrh/l/qVwKaK9H1/J

3MFGNl08+SoPL9PkWW9+TAkAcFwHVz5w+1BczsQaA0
jJwWXyluere096TudB17s1jbUNYB50OO9l8i76cgdkU3/QTFMpN0o73VXsvYbxsgOUmgZYCJh+OiMHW2

j20wHD1nuG5xywLpOoIKv6CFJ6R0zrXKaJunDgK6IhApkXHZ3Ir9mFOF+RQIiNbrl7k9rRMwDaO2Cli5

UZrjdQMDR/unpAMeJMcCrlmNB1y6wDDrBqGXUHvmAm
ywjx5vu4W1SFx72Eqe4piNtJwhlVCjRrL/HlU3k4zF/4ju/+KF/l0N7vWe9/0UGp+Fm65/cljJnX+yFd

hO6V+kotQzmJareOzBQvEycX1SCi748NQDI6ZAlpOoHDLBEL8xXOWmxGVWx6fCx+Va5w6/fwHqJRoKCE

XKYl+RsHTCuHB4feTYG35LdpFJqbvtor1zyyO2QN1a
19BbDuSYVP+WF0VsovBFrHPpNarB/L+kjJlhVhinHDNYmC6PKVz+bXYbUJQl2xGD8i8splY1Lq7OKSEy

FLLHk8+d4Ss0KIAebJnH2aY2NCrUTleE9uLwVi+ydo2gfBXNJCblxu5Jdj4zD9uMwTVjfjIVldtrtCi0

zE177Zxx7LDrbz+nD/KZ12ggqj5yFO6SQBHuZVCbxg
MWybW7zLzpNKUFvEDeJVFrWS+tehN8dzbrRNY+1SaWIALqPWP+pVzognlu8qWd9LG17PSE2L0jFLRk+c

KGD06IY/u3+ytkN0Eo+59CmY3464pH8kgue3aLQs+bPblGmwhf5X+43qK0Tq4flSu0r5FWSutZ73k2qn

qHbqfjingXbq5p091u0mDr7/YYLDIw0dsNy42XLD4z
mbUYET+vxckze1v7d2kwX3Er7DsVS5DxETkaVmv7j9J5zLQKIrUlRORGDyP+twzK4wXN1pbv9DUcmwhM

BkNCfjCEoc8R74RwzRUyUZS+UTMPUbZjeJq0lVbuxFzFbS1NwaoXNE9B8koTHE3oyRp5Uk4sC0SFnMLu

xMwCvL6QGR+++Nyuv8XweFOT8pom9RzsOneShVBdy/
vmMI2KUmKbLaJfbGkmVUX/KDcZZuBskd4PrmsQsxzXVFmykCm6eoE4gL7Biv889DSKRzrhXgCMAtgjH6

/8b5ZhtvXcrd+gBy9G1Dzi9cSRbwnh3/jp2wKzaZMZW5/vUeFnNxy+heWH7Xwlii98GAjkEGcjz00iOh

iBDBoK30KLMOxQT27TPqg64dFV2rKose0vA2Iq+vV8
5ZqH10VESS2bxoi2gxrdS64r35GgrS740ysmHvebbR9PURD9ysQwhlkcmWixtAxtqYacWwoJ1LM3XdDC

vHblpqjIPv0zcJow7XxfnLxRvHqfSQp7fRX0mEd1B83XQWcZ1O1VshPUk6OOgUhD278Y/Q2HjazPOVsc

0YDWfNpFlv8Wt8/mPvgsYWVmZ4A+zh+sDh3s28UblP
qUV2t7tc56R6s2qKEpYBFpR/0CMK0dKGBJUSkVBtxG6lRMTdmM7nqHuBC6P75PEbsQm0K2EOJxezJNwF

2OO6lvzuP0qZHfvoSZJ9JStrw9VcdzoWMp50dznJZUjnFk61NFvAxMHGeRucFauwgVW3DsSQ3yN3Cmfd

AV3iBKFr+xNtioszWLD99z4nfMMT5yGx+tdIK5IhCw
d+dyDWLlFcTnFGK/zCyu0/7wevw7S1pYGgTzzxPQojT2JiVWv1qCnNMp2svzsQ6DmANMH2CYakahHj67

YS8OUysIT2m+VtBdnPG4bi1Hwdx5DqnyJpGELT+WRT/AQV79Ez1L+KfeeizqKhmd7ZUM4hafvbTL2q1Z

5gl9YjUVDTuzdWEbK48mGJK8lUBY+nWzx4rby6vr38
S7JJqaH/TufblIuzC8Udanfkp+BG0ZE+i/yo4TrDw/L8KRQ/nE4d82SFgfk/Qz0HOiTFB54bjUTcE7JK

jt5YbMGarQI9CFLugXVY5YY0cdgHZY5f91UnT6e/5MjQjMhV1EFRW3Xs5VLhiOaaU+7J8mldIla/P5wU

Zs1vAUUaz7uBGWNuOd+cHLrhnq3UVll+b2WsszEmqL
quxtToL8lJ8vLqKeDdoNfSesh8f7C3IV7X6DOGNOs55tmA9fjqNgpezs5ovqQkdX40KOwTHM+ImTBg8h

k61YevAiJmAwucxq93zxB6rCCnYCxzRS/VQPdb3RD8j+ZTKztmOf1PY/SEXXCwDuheei4qJ6I7wtjU5J

RlR/rdSyQJmDOdaPcHEUfMxOO3PN3yxyp+d1r88gcI
/pyOX0euAsdx0iCUsQS/bMVmjVVodPu/rdz9YXhwt9xcOcMsgpr4yGh3giHtM3y3V00julFWEAx4g9Qn

Wg5lh/hinaJvRM2cO2p40jEgv9n1P/h7q8kfl6btMZ0SZKN4mpCZC1MEVnAf6GKOVYPPNKp2WnNtKdew

PBtQHbkMwhuXEj0d5YZQKKc1wPFJzBZqCL0lI7E8so
Ow93oUti5maOxB4eE8xop/maVzJ20nzxOUdTUKjWnpRDOfTyF+jELB85Wze0snql//o1buNyYPjm/2J/

0Xw/4EbXQat3x6mhY0dEKKZ0sHcRT9ZFSygpFL5Jo604ABUyyX1TscO9GDEgRC63eAZoRqUmQvuNoWeX

jHlxakO7msF4l9ltatnVJh3qI/Q7uODjHoEnGO0Rh2
t1HpQYpMALOZ1+BFlBGW90lOOwlWshj3XqJGI/gvornGrqteTUj7BHdhA3SC1A/osjbH8x/N0MV3EoHw

m9d5y4zSp0FqxWUHisCN01a0BHMt1N6j+/P5Vtok50O+SdEMgx4cGnb4ri1GJ1BXhhl0VQ+JjFfNxWBq

018IptE3LEF4dTteUSHmoqNNcOuDCgHAfJkTsqpEoJ
SmGIVM+UKvm/9MIevjVzv7McQ6I/XtAtg57PsOti3Md5V7OKNdKMP4NA1/k9eFrKoZobj6r+P/wKXI8s

oD1KZFe8V3BsJbeLo+/UOtaE15/qFSEGKmw2hWlEYVl2Qmm9Or4aq6SjA2RmSakFr84lxRA8lKgSD+DD

I44fCvuFUC3ru+webXpKVLqU4/Nnqo4ZMhVoLb9gVO
Pd93U+LW30MLa4MQc++in6aZAXvD0mGO4DFyR7qvhFPWk58UYupAHfCo6S/EpdtNCTETH88h5ISKowJN

m+4yBcpOQBZ2CucebyX2b5FhuN21KF48HrGOgP8L+HixMU7QkI5UZyKPc9NwXBDq3+cmQ87A6ePEZzbC

52m4/cv5S1zpUj1CQ6E4ay0kBkpREzcY3KuOHr2Fwy
vk93aAES2GA0f0pvbauDbnf6RX66DEZSJEHE5LscaOt65CjeRQ+ZmSQmNus9lNB/OVZ0OigQriPkuKqG

4ihac/Vum78uwdjhQm3pnjLBankbOeGJW+NlslzL5PgYCmxED2tBLTzPN88UZDUg3QZ7Xpi/RHMYOEam

jkA5zEMY9R/U99VWesz4Sh24p41M9tg3vmMNhIURV6
Vf0kTq/hgEHkUOalWUyJ7aKCHreR38P2OKFgrd9W20oyMP1g2QQUVMScVWkh9dsr7TM4erjS/b65fCOC

FiZ04sqDpPP6HwVPabaU60k2zbGFSbYjZUuLt1j2KVGSW280aRF4y+wTtgwN0fbvAI2yeVji1mnKcnov

w9dhbsIWlcJZa1zqMbq1rnNrIuHrprEK3evxCXWY3I
4E7BQQg0XbpdiyvhdQ0IlH9+hGT1mjOsI8tWofAvp2q2S7QpvqKPy8/2scdgDiaa7Nvyc/alFhZSYmga

ekRyUmnDDe8dJxKe7sbkGJI7RtRFkmWM2PhydESMZqzWIbpOMzR15Qn+8JKBsLQA7vYFIM4UeHOOv0xh

tfHvy5h1knWRBilhbL52QJ2v2+AgGDkwIle0znQfDD
1mC59NfuHymg7OOvMEsFlavuapT629N/VQmglYZtzNMujL+/WpRXH0xPoT1Q+mb5O+TBEvmmUkOmCX56

Y6nKr5m9885uR0LWe0nYRVth1p7SHhyZxQrT53+2C4XYve0V8Q7Z596e/ofbbRU30CaZz625KfFarUHR

qW7dFRPjTk7YiGDkjQCSp2PW6cw5FbuE1Kfg7pq4rj
x1lF3IdwwUzY/NnZxA0mjBiVHkZna2LaapvScFZsFw/iG69iLiF6d/tEb3zpv0yfp2B9mUAOrL+O8IwF

PYOWbnVmipmGFENHFjJqbtW71LLQakPGDuYP0Fa8QgTOvFPMnZgDFbC/57MPN1p6BGVi+GGsEkuiatgM

tgW/MSFaSjqqLwbJdjaYqm+j4KN7OOeDUPbwbZgn/e
1Yx3t9JT1KNfovffy+M2llpeafOO+g7nDlKmuDFquDsButNYhfdC73D6CDvL/6hXLPj4N2KGQcMqQhTN

KHPAddb/dBgLtqUwHmSJpZpK74pWGuEOtNn0udRmhFVKt43i71GNXNXZe3vizkYLDn8GDQFfQiy0ixXM

eL8vwblgQe/MYsiPz1paRl47jccQ101jujsWxX2l1Z
Yg5DofHR4V5TLSnD5IBOuRscmrzbuCFx29fzM9YToBouzTYNXIJWyca5jfwPQc+Epn7cCr5QQ6mMoGpy

cqp2zm9QOtFi5kC2ou140MDFCk7eZalHOesE3GhWlI68FplsknDRxWmswORnLiqzL/0sGmhmzwouAw7z

Wq80j+jRiO8LHMGUDygaGJrfKmEOO3+wrdUu6V+bTd
KqDj2w/3yghQArB22kQdMP3eVfBXLbkT7+KyM9iZzYNTE68TeQvtzv5j/RtqElucxEupYzlybDQTsefP

usdL3xw2T5cNQ5dfnqCKUHjt9dQtYh8UjWz772u+oNeczOwNRSbT3816A/Y7vMXkJa4omNUvI7uDuZ6k

OBJIQxJedZlHvoW+UAbz5MKh1ZfVh1LRJ8Uaf2yvr6
IOodIm/PlBMUCU6Uuky65rAuhIEKC63zVRxfq6p0t3QlukmFpPeInA/s5cMu1hJwszDQEqesM5uuhgRr

Hgs8R9QsG50ojtxpoEAsWgHM/GArhLlzRRjGdwb5vQjPVvMPNyJ+6jl2HaRJ04GYtEq4Ufa7CG/qfGAM

BXJRXbhzaknN4bQxyXFMuUi6duYvY+0Kxa++epq83c
PkFaZX/jSqKU6dY99Cv2WhNqfezeC122KG4CefJNiNL9ppVPslxplSPlCzbLV6FffN6puQ04rA0Rv3Iu

Bdsp3GR3CIBCnrQ3KbqMqMsflMZ5iUIDJk8Qhy8cz8oHUIU6PY2QyEWBRSuUGZHpKt7DbBBsiF+Z2Wqv

UEnzNI7c7paHXy+OI3mE8DTgHBOkoX5NW13JfnjLZh
2Tz8pDXUCFtKbD9peBl2ebkWQqYfj2ywxqB3tMReHB4jfB8iU0qVselI+r2DZUVm4b7io0Qt/bcbebUj

OyRAr7mqukVxoCTTDY+FIdS75ngzcgIpAt59ucYAEH+H/qf6qQHrH/vSP+qWni/Q6YQaZzLt+XAI7LdL

MLLzbbwcw+p20WweY9M56vw6ME0KhvfrcnIhUzaztn
OYUDroYAyxCZu6tUeKZR5pDqLCwMJGBE+McM3SWTC8Cy1skJO2CLhFPsqsA5k2wopahs8tyC8ZCCmKsu

e3kGJ/378Zb3VEgQ4YxXEjG1mLMcIx/uTS0cBv63rrrUhnbme2Yu4CGVoa+RwEElDBMwUl4P96NoEi/2

74lh6p7ykofpfAJSpP31Bj6aDVZef7mr7grqB3Ri/L
NLaueORLyUK/G/Bdb//wEgi9cchowLZEusOtoqqfAyX3yc7DIEeYt5G3RHoTHoB9rZrF0CuWUWilRRuw

JhnA+85hdJzVaVQ3+DOwtfD/y0ju0n9F0kZtH8Gwk4RXY/zV1+CWMIGwDHucUk4OuPns2OYCNcCradGf

wrPIkf5fr+O4NtR40cFKZji7IAlieSi9Ks/BgHkcJX
lD2R0SObtC4giBPwcWKl+tVcDBHsl1Jpp15wSZOW/778PN56uG5IvP1Oh9xHoBiDIMALSgWtMzShkh2N

SlyaZOlLgdFvBtkn9kh90UzA6aHJjXZPQDL30IsRLGEyXzWy8GV1T9YkQ+/G4GqQfAiVM7ap64yVmBOM

d9Zqt1tVQLgiaF6fbrZP8zbcEXdlR0/+0tFHFmaNnu
J6oVR2wyF0slhV5514Qg20v8icyz8C1spq/1gP4OXK6P+HekjVEzQFhbvG4zuBaGjyL4BviKe68okRqU

Br1TdD/qeo0Ll0ATBgR0OGfi5BsQjOaS9qpUbi4ZIy5T2znTw/zBF5N/sRTSimIudgTQYm8wibwemANL

7fhE0AJAESIninGJweYqaXBCXd8ho66IlioC1xz1M9
ix5WhL2H/3jiKkti5NTF6OcRx4XhveBIVrG4Z0DJgXs+puGsgqy1ZLPEMEJYj5bpUSlnjDM0qyeLp8Ah

yIc8XJepTqYnW/gpaMqeTNlHgssE5dcBvxJmqcfNSaajhRqYsZaw4WfRh6UXsiQL+NzJINt105he7329

FTBBRzxrDNnEuEUrQpYe4yGyCjjGxcBiraf8Xms5pc
UtYB5WTnvEPBoUD812+naujGjUxa8dYJa4hpQaLz1yGQ5V0lOB5Hjp5PnY56hPwFGk6NRR5OyqT9+hKv

2F8fm0nk7nckGK6xd1LvPJsbTgiPvvYv8czSZWiRGNVeJ/22p6HrcTk/x9FAszRAI0dRxu3p4MKvQwwR

pmrsX7649uGES+jwWBtsWCiYTPvQnttOfjf3cKGYR3
+ayhrm+vqNUKOHE5I+hft8wdGdPAfJatgt/7wITRkm7SJ4bIFChrogep2mRxKq9IbjrO32onnLa/YACF

zkAvmHQDdd7ocJEkSf6hk0/UedZM193lAuzUrIorISHZxNrTJFojEe6E+r2U92bVZCWhkyjv4kaw2ll9

yq0Nn9rw6WWw5+B+Czwms9a3jP7Kp3OSYFYEjAFKBT
XKOjP938rmI0PpzQHXS90XrqGWRkKMESD/6+qcDqMEOgBvqaRJuuvcaKR6PwacM7hp8rw6behpn7PmLz

Jrx9W1zgwv1w/52SJuWpH+FS+DHz5fsgIFhbwgIYb7FCcY96lafb+m2apUG/6aHdKKEDW6oFvDp50DDT

37TTHhBsxIbhMfM4DX4LQGvPgxBDEfjzbFcQUYn7WW
hrfM27Eo5xtWrbOH+JVINB64QGBDCtP8G5z6l8rgw5Ft3gXAorqjZ36dO/LPPMekdPtU3e8kNHGct5wy

cI2UfaeM9Jd6zqa8jlgM2oPlw0gE8QHy/GQ4luDCP+HjbM4OgmZakQZa9IB/6K3G5N2Jt1qrVLQNwE36

lb8iYlxYpfMJ15/BY39Uojd/PPgAi4yDjZ9s7oslyE
S4lTuCWwoaOAjErlXSFP/V9ixuIv8vPdF0SvV4XLO2A9ru/22mtMnIlwPHZqMB/yIS48vCL7DkMTtQWY

3/fm/3DSBAy5H0vmY/0rIyIPLoFWdCHoLEzzyCu7sYTZiR6jkRm3Pl5b50rec6t4B2w5eywDJ0Jbq7i7

vnr/xhCJL+rz2mxzacuH1kT0sYJ3gMoFn4ezc63nj7
xwok/+RoKJwLH2MDHS4RfyWwIIPzQx2L5CpJ437hp3qDYwPnvu0yFxUN2cNSrVVq0DoRkO/B2HDgSGan

jPPZIr0JXNCQXaPUSAlGCazUxAh8yPkAjVDPo7p/YmvrtIrRYoDuVi4hakeyz0ppmkx7FQa/9diflUvy

SSEVXSmr0cSXdM3Q/oEo21Cnnql285NMBMtqsWLLLJ
AWf20jXnsLfIjquTgPx+6Iq7B8nz21bCy1wWQtgjU7z+bwHIHXyN7WW53BBu5YSwt4XSNiGBVdcmavq9

q0XL3YEI9K6RqBGMHP8aGxYREoYs1xpYsmmEOCQGJrDnuiuHS5zd/bqoJHnkTCGPdDGdOAV9U6xqKm64

FXacOZ54/aIJV3o84vV3yZ+WI0lpIZyedr4fjzQe2C
/cjhi1pCLb822MWQOEn2JukiDkbm+5AKUo6Brz3m1VrRjdLtNId7UyYLs/25U5sO44aiPZ1kJeRzyRjy

PXhK1FGCcSsyQAjnAydUIO305zMwTjHTGp4hiivW9OXERB+WSQJQARfCn6bsK3ooGZX604cWTaOWGM/1

gr1T+fPPt7vm7bve+4hAeDW8EyAA+C2YNzOeHKb9uC
PSLvPJ8Qr0voii49uoMBteJPUXLobj2ywvhULlhZKhpbavovW2xsnVzPdrgMjhnyc7BdJoirURXyQ2gk

0yMNdFTXyn+Qq/9bsOAg698m/hPLDdR//2rBmDAEU/d6+PP49J2rfbCIqtmTE+sbelUBL1vK5G0E8lR0

OTYe4C51dmH0Ee44HssZBA1PGbZ38T1SuXg8+6bvST
KF0MYHLJf2ztiEr9XyjFGebIFN2h/Us5oCm6brv48PYQYuVj42o0wAqZvRtzewhTraPDS6cPDGF8L3SH

EbRh5f4ydju1WrJDIi8rsXc2V5lXAUoNEDSS0652BQEikid9sxjSZtMNMFz/qVm74cVtvXwFuOyyRQfo

M71ZzmqhhK0BQBZqQ3rjt1XIlAlrZqXHKvhmU1Lp3W
detmWHJ0oCgiHB1EgGhgeyJbTHbb7Sx1hQGYXBdhL0xNS/S9ANAeJwZjdDVBKlRf0fnF5jXTmWHwMgpl

m5tKf/f6yWOA4csh8d9cjiiVft/ACN09qOuydXCsi7BCH1Wlcmmr42IMFRXk/a0mgACPvNANx3up21cU

TKo6S+FLbUQD/c/VWM9jJ2+FsAYpWlEqkZQTaHjNZw
DNsFCxCUX0+EbgomlaDKEKvsqi/Oa77r4FUx4K0Mo0I4b2l9zzvpqBNEZtoptzF8eo9rq/36lGb0DYzx

2vZsoOmV31oN1B+LLJYL/pUmWzZAqAHusGqEBqmamNPk7zc8WuCNZuwe1Yfb114Md28809NuzSAYUcHV

VKKdfZ83wiiTKghRrXdx2kbUelpfQiFBt5e9BhOAac
f+bWXF+csmUHeG6zIHpzfireWi8kBH2Xm0y6GqkLt1+1D4Wgp0Njx+n6jEA8Uh+2HYyaYGac0zKHk8Pe

0dRmtegZocLSDndCJUW2DJh2pv5iQg7XJdPfxdp1yZpvu1kLiyhO7CDm3Cf+PYd5CUecpg0puCRwA1/9

lxJEnqDMB8LRmsu2RRtFe1/dWuq972v43aFLR/TZcF
zkZ4uvQ+eYVAOkgudS23VhP2KWkAdJf/FyQ6b/YMbpXdD8Vac3r9Kl9mpRdtjH/Ub+HkfVRfHqhm3or1

FP12q9tY2f56ogJyS5eBlFGE+X70HsFooQzaX34jXwISufPTQBF793Vj0cxH+cg24xo9Ko7UIeFxcALm

2DvP1V3lBP4r7re+6oRLGRAsxiLM9kwMuh1Ku2zMaD
B10TDBmfIYEn3S5wKvcwFX5wqODp55mALMy2W1jZc/bCoucFJ1tp8/XfO6+Rutr3ttam0B/weFwA0jrs

nWy7zVTmZzN30pmBqN+Ea/2buWZqnuO+BZWcmlmM2wMKrfNJ8JD/cY9WzYdlv5R0FpUkgPBWphP60+Gp

4AcgxeQWr4ThVbmfh/HdogEtrtxVRuhw3QS/eL+T6k
pfALPYWHXrMt81Bb5oBh0l0xpr/g91pf++VZymm2BKeq7+cYzomdJXHwLufPQZOlM49SmrVZKULn/l2T

6OaQmaQlX0c7KJl8rZY8wCgjyEydMZWD4HON8cXKGaSrd3QpoXd506U47tICE1o+55nC6GFMfzKyytdD

252WRTZxiHI63gD+pfLOnat1VzICM/mvulX4IMBRCs
OC8BgA03Qxe/qRT+j9jxV6jISDDORX57pdLcjY602w+smX3H97b1ee6xsnu7gQ/WZa8g0ubcdnx8UFYh

ZJrxI/zV/MPXXmnajvYXY77dxt1c5ma84itSj3/3KwzNDPk1VrD3VFFHYZo5YK/1XeK7AbgWX3j+PSzA

aHvo/o/Xgu0qs0k8f+cC0CoGP+DzKqpO72eY6g+m/+
qGDG/GqBU6NDfZDYIEKDeaGlTWKJgqVSxj9rncYNMEGcVgp7H72t8rustnLAKC+3YZrKjHKedBqx5L0s

CrVhG0BIoNLiQ58XAWcqj6XYV0p7VdInvDV5bF0eCMKiZAPhn5yqZ66KrB1193UytAwxQCeYSd+OS0L7

EbS2ReBWB3W8ahWoaYy4YVpPORePmQkUlDNSlt9A3h
LqodKjNcelOXcwcjSCwCCuFvSWlYCnBNzrzNiLvHZ1OXVHPtsWBCTFN34j1CF8umyzjUrm3Q64AH3GjG

ZBG41V1YL4RhEo8H/uJMthI0pfaKNjnFzNPDBOBeulj56uoM44euCCxLcAy1Kp7y1BfgzdOyR4S2D3S/

D9qwOuhy7kO+q2/EkztUwX0KJ8hkl3KhKbzqVQDCI5
H2dVh8UBGjWWjdoxCwF11gMMnzuJUM06OjseGxEKZCFj7GYFwvOw/xA+MGPuE6g4vq+H4FJar7CFxGve

o/OTZlK8Sl3Ydu0PFB/Obbn9UUvIpLdoMWFVIinr4JIMkba9aNowj3kV8DTFiyTBdkfuA5J5mUuixTMQ

qX3I82DBCxfru0KdmqNkYAyQrfxfh6fm3+DLGdfQER
rdeBilUJN9u8neVwd2S17fd4MmF0uTJyC7nc/4XGQZS4u8ojcgG9WZSQmzHgCDvI+UOfgRMFdg0aHAxs

y6cwpVyHPHHW04KxjvgWgDRkI8nCGSFrNQi3IrCbI93cRI6Lh/sRNSWpRdI6/75kKkRaZPB5BE6agRUY

FJkydH/dfkVc+NigaZGTsyvGhvu013mICQOyEOglj+
gtkLjkJZD7YKQjv+r1QWq85Vvgh0jX3PY/VhckUBNp/18I6KOp/N907GcOP0vQqr29MlslOE+arra8hc

I0aHurQq2dbG/Ol4+evWW9YUKa5tUWa4KE9JjNDq6iKMjxt6EO3kJ7p2rzBmnMOAaL4LjFMVS7+wQE6H

WFKX/g17CL72Sv23MPqLBX+XzMcuIFKPSIaHGvgxx+
5CbO4W2eHRucuvDHUacaWbBZLGoGEXbddAZ/k7eaMxSHG/qM/oUnr+31rxHosnozOiLXUWidfFz9ahc6

So+NuPjfy3V943H3xOts4P+tLxtHSL+yR0q6qiBa6sYoBxOChi9Nrm7SNEBQbvgMxDQXEHOVq5T4UW49

O9XhK9D2dfRWoGcvf4UcSwOOgyvArPS9wHS7HEyraP
fcCbhFCAH6rkP7qtRYYiVmLwdfSqwucMaZ5AK4f7KyrtjXqSpFI2DjDk5t+bklosj0awvGxNlq5Xqn4W

ypTDsuLtTeyv4hSqKuXix460G+NvAyS5geXsYX1p01GkBQCCBH9XhChHoJM6IHNRnB32NLKz96mXZcOm

jFqMBedHV5xJi2gFJ112aecYQfGvAwW9cwIZsG4fQq
/RDlL0MMoFnaEYvSSANIO4ZkCWf+0ELt0xz5ByhLGwyIUPkggKdc8rZPmn6pqJwiVivHGOEtfKtAR6qU

/NchliwYRXSFNAvsRynwfW4RmmiGCM14JpOkJ1JfiZIYSJETY2zdqPD9aq5qAENLAXzjYraiSiiH3R/y

OBd2QqNJOwAtp2KcCN/WkT8dGNX5stgiGvIvY9c8e6
EE3A2KmaRaa7vTVfdflMffpW4dareOV1CIe2S9tRzEG7DH8UwB2s64HPBRT8H5ZCJOcPEkqtGm54x5Aa

HyCcRGQVhOQg8hY4huP+b0F71VauZJyIP9P+Ops4s26mzybFxij3+X0ZguzrYCpI7Dvqj4fwlObV+OC5

iTd0qxn7j+BiQcACI5DxuVLp43Tqwa0GE3mjP5WFSN
fJvjBWncRFUc8wQDr2oHX2lLvF97NPHfqdFw5vEdWDXlBp3qyDtq6PQNx9Ker0dp9sacOJWKMUZl7n01

VVTkIUWtZLADbv0AFimfzgnCVyqAdDAjunBYdSi3pjKqjLoLh+dEMInMR1BRTxRy6wY7YsFJvUx2m9HH

+ioNS4Q4a9+3YBiM4h+nMAftZ92cQJDYuw9Mm7ag+o
sgyrHRQy5AhOzPlh+J3kR/3hLDUKvHc16E4YDu6EdvOPKlUYHxXBGhPK/gixYJPAJT11EEOBwbTlNK0j

xNV3tG+Yns2sA8Jrgu7sQf3gxI2LaIcw3eUZbNzGj64JWlbRnyDSaDH4KjxwpNNu0OsGINxG40QjbDK/

Rbpt+Om91z5oki/1nYVZsTzmPAnc0LR+4zOGkgjSBE
XWi9azs5+AGg+1GfSbD3KYiTfa0Ot8PJIw7C0Gd0jjOvT0goRkO7Rp1vSFUVyisxxqyWQ8ImyqmkgSWR

eEagOOemtLIDh8RSWl776+ebKTsfF9TG3Ut0DG8xjmkjkR2s3UoLIf3LAGYwcZ+gEpgzI4vj/bjAgof3

YYtvHkQdpNtieOwBZKq1y1BBzfRS+9zxaTTZw2d81V
+ZRcpVtgD1U34ZPWllseTz22GJp/AMD7grv7VOq8TmYsoMixBKVXEpSS6t5G8U0C7KSMOL6HnBR5nS01

vDUY3vFRh1g7/4zNAnLcmoIF9cv9sEsWwh6pnZZGJZW80EslBJv+vbuYIRMLihgaMk9xkdN2BEHkJGWG

1HOac8s7SSw3ZfVhGx4YC86NulQQiFL0WZQ1ItP9D0
M3Wqdql579PRCYCvmfWumRT3PZyDsXAMW0FhCk2G4Vr1TedvNzDNr/FxthDYQc6AUzUqrq3k6WzAKqWK

/pvIOlgLVBUPjmt4e4tF5zYke3/DC3+oH1XfPtvNk+Eyb605lJnWPhVdOrXSlerUOAXHmXQ3amkjkwFW

Nyy8Ugs0kXsBH6xNBUgx0ieTd+hASUkJuVHylGC2ES
fz3FpPpesyNA2xvhCDS60GJ/J6i1iGHYkYTf6m/0wfcrQ5mHj6I02syxKs0Akr6wqSNQLPpaMeOOdVJX

FS8a/WJPp6uFidUJX1n+T6/+EcmYmIwNMTLaqY98WZNE6Wz0K0D2GaAA1saSsT6RgMiWcbLp9zpVbUqi

85sQ+nbAszuo6neOZ6vnCHrBQLzIkutLfrB7iwdeSx
NOaVBak/JBichfnoQGxQz1MJ7lzQlmO/aAlKX8OVRq8OaBb2KB2ZIV/CwmtUe2flulp1Un9rZ30z6vlP

yRNshabBDa/pBdD11/tok8Hg4Lu7PmjAREmAg71tT/4M+GxtLuVptnsvnhBbtg9pc5lNpvQ4f2U3Q7MK

/Z7XpwfWjZkgj3P1MFMa4g2Yhp5RCrDEKoa3mJIRLp
/aRDyI3dKLLlXQAj9BnG+jrhsyrShOXLqLiMlOBOIn4n6NZe9EnB7WCjXhRwv08GAg6ns23oJswOP1DR

981MwFf3AEDuDct4mpwb7P1Y0Xo7FN8nGtHdjRKw/nYDr1Dcvvax20AZoKAWguIkSnVPWvjuJw+pwOuX

oHbmz0/Jh0bxKRkPHChGzV5PLUw0+bsnDpDR+K2L35
2TaKy6Sxy1A0kv4ifAW4svwdkC+clXi8yfNrpbK2W5c26EW1D4xE2PJFtffh4JlqIrphQqS1aKpICcPJ

xUNK93z5C2aFKt7a3B7tswQLCYT4gsYB4uGMmmtj/GPAyBbN9Yh4lsTGkyFTvxWovscGZrTln1ovzC9s

zNd9K9EmA+qOSF0lEKlwoL1WNhEqz3RvxdEHac72z4
B8n5ZOMxAbuOHwXnUpSUOGzjNsxQXDTYwgUGxO3248VPSXW7baSdoaMXCYXxSwI/bxGZLEmZ74uGIWi/

VolT7PotWot7AQpqEVV6ZjeQDZtQ/Ei62FjVZvBoBHbOWR061R5Nr8DW4DZSTSEbKSzUg24n5W4aLxX1

XuwgoRvtUzvj/CNQr6MN9cb0smvjxzDZ+BHuQqtAcX
p/uSWSw3nQU2ln41wvHhWvDutfGQz6p5Uq+djFvcFAy3OZIeL40KajpTEay9qC8i9f7t43oegKS0uOiq

pzZsaXtWdNNyM39MEJ78ClvFr7JrWY647ysxXu6BrpjE1ixkZrsCtz/5ZA1Ryw2scdUwJPnOFPz3cc2M

SO91NA7uTSYcQjqFCNaE/MOVAnrDbN6qGx5RFESgO1
xnGsrDpSquEtHVwwc9q1bvD2tk2JGQ41ZiLaoHzGB2HfLUjBaRcF8C/TOO36JcqUs49WiibSdszNLem/

115JA32sOcszhGZZ2E+fchlQiYV35iRrPn4sdf/flzbRjAThaad9sVZX4KgsFgie7kUfd+A9eBZ8gySA

YizwIhsJupS0bIET/tyqR7E2kH9PU1ayzpSa2JWPGa
dcCz9wcs+fZ6jxHDk+qb6jDv744M4elrDWuvjVOg7Almj+eVNYIKySHsLrgOyZfrQ1pdIFlrFx8ERQr3

EXDcE5v5UYvkz+vpJHRdJrxVIQh/2x9WrF24WkqYl84kanUBTDOvaqUvGE5NKUL+iWdXWLH8xQ8CQ1uK

lLrcBrv/P7RpyDoaazfGRxuw/ufYJxHb961ZYbEZ1/
nTlAQaFBJAoDeAiTtpegOwHWrtPMyEG5a/JL+T3k6iEOeik9gYu928n11Aohos7glPTKf4XQjTO0psON

53IB+PAgS7U27TWFcJn+BD2sWUlnY7fJAS05UkSLJj4bnvlkv5Sw8bN8DEbqXXhUAUUTI42esJH0E4qe

7fzWkA5YCEy3/pqZnWLS5OhYUX4nfXcB3DuDpAKVd7
YbLVJ1mt+KcJHoqPwc88Y2BHl3WpLTGIIfj+kErYd1hWuO+SGhCLmwhEY4T/Rf92cmXfYsLFLwhLsDWD

eXIzby1g5ItQlAGqs0Au64i76iCnXrEr3FxClXgIvDRe+iRYnh5KRp57wyYs2Ec//i6CwQ6cEOFqD3E7

OsS+7wKztz+ffW851IfXLtaxI1sHCRYuC+yOtZ8DWC
VOf24Yr/YSLhipNmsZYfXQ2KGPc8ZJcwGFAWHtwMx69UZhQ2ENIqzXU7Vs6BThL33yuu+bIOY1YqXpbT

LkeYTVwpx/Zsg4ZK8M5OLi1jyexakR3T6PH8AcBF0buNvUqzPDjH0lB35m7yy28/VPW40cxu19MKqlHb

3rzp18//wkPROWNQUpsNT3ID09nSw46YNuhSq0XzC6
3GdD4jt58TGPVCTs24VhuVbasPqg5MFHQ8pP/pH5V29QcR85D53+aYn3xOcIHOvmsAnIl5LsU3MQlJJG

/loO9qQLS5MCC5z+SP2Uf/oNXyjrL8RB/hTn5y+eBDl/n1D0hsc9aV/khZYHMd2uZkuvs+t7cTab7zer

aLUGTPhlJc/+/0Ivg6uujMqrraumEhDC+puLhylFQ+
wtXvPtH/8tLL3b5+IILFjyQxNnAIrWd49LxSCOFGHNZmAcTASWnLcyV3IDj8aDrVxGL74zUh+CYr4rFX

yi8OmjzaWxL+eQGv4TlG0XL0GnixfbqnItB514c3WTOlcujGTvy6pJtU34rpArGOltY1yhcM0/WKlx4M

5J1/TihqqRiwAmOPDNf44SB7YwpNHNRL6+Qea9DzNP
BobjsrGNyTyXW8NXcqDgXaivyWkRdLlR/qVY3szf5yfhDg2/uw0ijFCk8iH9Im+i2ADB3z0svX4gbLcg

nTJ3+Bf3BUZu5iU7BWAp5s34Gq1PWF/Kl8I/h5iDXGuFua2x45v/vfC6iUiIGn7UpcQk5eDcGML05gJg

ZeffTDMkaY+gOgNGyd9hlyPT4m664yh6go5uSNyLE7
Wg6C/NM6+HJBNv2R0/ahxqmc14M6AKyEijjoefDbLBhF5KhN0ndZHZ5EVFF4+CeUYkfbAZKYo+nNoww8

pVYZUruEoUxwM4ivtqdLzg0B7KCtRDYdoOGWzI8ypMLyxswECdkef6ADTM91fDe2A/M9wJgQZW+dp6oP

RtDH+sVmI04gpuDcs9VYC7r6zYafEUL2TdwdPeV93w
HBtipgkXMkhsM2dTPLXUM/HO5r92TnM3eDdVYzZ2fMMaMxpvZ/F03FCa8xIviF3VZk/8MfmVLQ7QkG7H

qGtR/W9/QcmnR8eg6CkaeyYUwQDgpTT/HfTQudf418Jrm38TQhcftqwRbbRKHO4lbCY7GCCePh8toMxy

ThL38lbbBJrC6WnZOr2MRd6IaYwaId62Dwi+dehJPf
ozjYiFxwpJFAoK31/fr7JeTQVxbffCWBUstDXKqvae0VZj3jlKg52IR8Ifq114STvrZJHPRKaG2BWIFc

+xPNHgyJ+ZEWNad2Z1MgBdLFLd6tMkqxIb5tKoJY7OldN8IN7S4UBmRv6orARyI7FICGFHcoGyhDJJ84

BU1Z8i7nK+Hernandez+glF9+gv+KQk7ARfSxzGzQbj4qLDB
x4X3+XvqDJgclOunqmOTWO5fbb3U6klMNotf6MtuGhtYXT7GK88AviPbUmv3E/eN12g1RxbLQOcavjtY

VIZqI9S3FZMKDxnPRx1BT+Hj4qL7O3O/b0KuXzWcY5a/SaObjkOwsRLMbsqhgOoMOouwS/83pjL0clfT

luGlnLj4MTqt+HcavAse1MNPIBrLyBVRaNNM/7mVp1
g+LMwrk5oRSQifVimV3Mcd8DmNk9mFQgs13a/83hKaN2a+GeBmUPfXi1PXVtIzA2hst9iroOioZKyqMG

LNF6h2AHjAV5xfAZQN5wnn5mw8Mx3IkTc/0lSLCnkIgOhrRdkUNwnMcbW3V9xdhuYlMl8Y/FqsQ3a5Qf

0D1l9sNCvFO/fwDYLVQIJV54di8/OKQYsjrX6gcMSI
4vF7s0BnPjJKMkhvxW05S2flj7T/UfIpRhPFRKaOvQoDMjXscyFzNXmsoI2mQ36C1vMwf4lQuAqX6Nvk

g+uTn8B0tIojn8gLDgF4IlUJ6rM/wu7KQCX/bS/EmJJy4zkfHJ3pFge7Jkp1kgeqwopEwk9DsixqJ/Ma

Hbc6qdvQwYpZwThnOH9txnPnCTWR1d+i8EQUOMJdY/
OuKHMy14utRDgSrI8uFiTCXKPC0kqKw7uNULSYEwX80tSAhEB99WSOnxaRxGLJi6QzNqx41enpBUAtiW

Nb4EYIO84PRljJ+4Ty5AZp7cXrX4CDDipzRL2bkg476zwM22I7O3dZ/f+rJQGNEJHM7He9K9KyPsZd4t

87+xdWp32S+10GkGilALu9KxpiIUatGOGygUO2bWR0
E9EV71LlTavxnRXEPGH9BXYF3JcX+YSlpvdU8f5jt93pu0w2l1qvycBySpkqe1bpMNPTc//KsF7px/uI

Hx/Hry00PjISHifV7r6Edxy+674pHKOteYRYYwWKmjzdAefDS1c3p/6n3UoA48WFcGvp4xhiVDQgELZH

10gdsIikESLsWHoDJT0udVOCMR+fdKyskOPXaQRI1x
4rCYnFEFn0+UtLgYuzwNlzJx8+D1EIt/VQYiUkhz3VpBTuyAhBtSa2EvbcoCauFMHuUvrWGj6U6O0kqE

EEK8LMs5SgE//eDrCRHSCsRcj2sfg61gB07+7WFtwv7V1Mb76UZckC0jYLFO6fkXMmqQVCIvk2vi6VcS

5vJU1o76OBE+EqH0d/eiWPA4nCY6txbOfklJ8MsOrt
ToTqG+mL74FWazWgbV9/w6LHbtKk82izUxVEaQIKsb8kFH2fEMh//pb2B5AFQl9c6H1alg17GttoYds5

b8agG7+72KDyTJkmGbzZfG9lVqntHW41b8RC4dkWCRnPpt1TrgBuq+Rt7cRr97apoDHSIjeHLMDWNQ5v

v4+r6b91XvL9X0br0yO1cfbbFPsC/b41VpgLA+uUlH
ClV2zJHgpF9ZbuOf5l8ooHl4WFYC733G5cMwD7v3bwkh14vyd+qc9bQqMQDVvRJ9IoQyJVwqG5BrnxHO

tO/3jdVcX9OInYl+fF3niSJrRkBmOvXM9mJGHc/LKwtOyC6cwGlW7AEgtqCtDE+xPktA0ZuFYNWmtPsM

ARhzM5OIvhnhm1Y2BM4PO3wG9le0EFpN+8gnJYvyQZ
lF79i2Aj6iTJ5/UITbwkLGY+/+51py0bNxrI4QH+o2gEkeNHPqOgG+hNxPemNTyCTZP/fi78WYMOEvZu

ClbSQQqsnKdNHxVzrQLZCWKvXGQhyZElWWpIltN2kX8DAmMoMN5CTEx8sp0BmXD0/c/LHefecV/uOA9n

nIf1tB/0LHiQniOtb5vbZbJ+277nQ4LscLjgSYvPTd
VzsYWAPDdUl1jJLwOGUxvLbJeFaVvp1v3wCBS2gG84ZSWnz1qvR6AlOILgaMPRLX07rYCETm0kZlvfTj

ThLhcf8aZivX6yQTCwZRjvmu1uJEKBUC5EmW+Vsr9ajumUvcgcHG0SvlhhTRedkcZAgX1DpXZzX73sHN

eUL8EVd/fA9flooyDTMrHDPZvk68l+S+KSV2O44mQA
0bVsPoJJz02YIP7NGjDUmP8MyyPjBB6UGOSk7nn+6isgBolwdsQCvBwOEcT96MBdpKdBwsJnn2gqIe9P

07iyz25gabIPSIoYtCgg2M5TR2iC3BLI9jAEdflwL1jm1lCC/PxUWCouO6kBZxfApQJSi1TzXE6FoWLI

ZInl2EyI3U81MDqmpbKbEenEVqJLM1MCNNgOD2QAil
PzZwENub/yaBXP3rpS31BzSKPN+xeF5DSNajNma13ohA1NNA28XDYpexzZTRGufPEOUWKpvzG5fBqd4F

aW2Uy3LSnpQU3m2I2x0i+hs6pwNEKaTNMGeqTcp8+O49uLfE8u8zsJWXbgPdHbvgxAYHAwa9rlqvbe41

twG4gpYPFoyIVsBelbt98q2zATKC6htdhXAPr936AS
k2lKEAHVVR53FH8HS55wEAuhhwsMIPBtrTs83gxwH1LQbuboy7UcG4nplQT0kg5PbtAf05BAWL5MLIxE

2CHSXrA3M4pn0SYsB+tmR/ZGYiub2gFeqb8jcPURzkzggRQpExEVTRskRTs1XMUmaJgL4EuBahGlM7Zw

JnLvd5+W5RJP2tnaHl/qzyNC/xjdniPBU+T0E4pStu
Q+P1Nb2lq2raDr8iFEPE5Rza6f013m2/Utl0mDfQVHDG7LvkLFY9YPouVTIy+UfQsqV/ksbE7zDZyBy1

NaX75egDaiLb8N4qwSbzDU1AC6T7bSN/GjnF8rXkp02HOqoXPjwijMPsRe5HmVyTM/gLfum7x2S/sSeA

n8qxLRq06iHwjzxtJwavB01ekz9b/01yzCud/iD9n7
M7LsunauUqHLTKrHoQXjFRSy1gH/MLl3SfuB5sLu1IpExmyL64KO81v4pKPaMrFKo6yvHcOvczsid3xC

Ax7JfIS1W15eTGCU8oRgx1W10Iytw2CSWaSlWaZet60vVAT/StDi0PVCpOeBpxGNTKYsVogugzBTyU5g

k7J8nobzsYFjBc6OoGIOrErjSDv44kdIQx7eNdJFWc
o0dYSK/Rm6BpQUDCsY0XuBnaN297uVudSADlt2D38Zu6NntzB/fcdNA+vIV1okZK1AygdEQbt95maPqs

9qhUaRpUaHs5a9EAw4JmZ7+Skj96FFuLyE4oMMeRlPJlFsOmwJytoewgapQZuOEbo+RfMWeZSFp0S475

BRasmgGHlLIn+GYFH7TehvKEWwpduPc/hEBjxxaVd9
DjvaK0CwNn656a1lh9mtS8sJsV1/B+SXZgF8NkXCO/JZNoD3d9Bjdz8J3SMR1PhIJyT2+8Rpt1t/LAuF

crqxFgGyQe8s7t10oq8xBaL6kYoaCxKZ4xPmRBDfb0qUWO/ePxLUGlJ85b8fTMubKlJoFklen7Ns9o6a

v6pAePFI88WQl1TJBbXEnlZJhXwuF0Wv7sVTGbASFw
3Q4G3aTyz+I9G3hMatfskOpQ2CgxQHKIa27VZMpYHcCfAEK/3PvbjUorLfhMI9fTdHFit9HTnjR/ddvL

T2Jy4PZVhPXqaNJ1434pmn6Xagu9GCYOqZWZh9OoarTUS+GWk0N9R3HVuCWWxzwebDzjyzQzZ+JuQKP8

JaOVogsJMTmf2KnGNTz6uyiie01Lk7GW/fvqxT4wp/
7GCzu0CmVbUDNZ3nyodwT/CEGH7bz+6dwmOqUI0Bu5tfCctBn+e77WGHaagcDZpb1XBiMXTMm8ODkwfE

gngovsi+qJ/3yP85zQwjpTb09/BSLYtgbipi1lYwVmBoNknXVMUi+tn05w4Y6da9i8kUlA9Y7fvXQg0H

4nKydRaoO6dM68XILYXmdIYTVJ7L+7Uv5zF7yb7rOh
+6N97W9cL9pM3puNs7c5L6NaR8EmEZt6zVfE8AWIZ5SQSyknMAZEFVr2wWEkJb1RQt+T80VUFvv5FbV4

QTelLphxjdwJkZoCW1FNUyecX4dfjUDr5IyNtek1W4VABqPOdgI0XfQR/SHXuIWdY8ymfwpsrfxtXrOA

3EPAvTTze8pem5UNe0Qhv0bYKhgy/Te7wnwI9r/mSf
vrBJVjPonkU6UB9SyXY+98ujxQBviRfGNo++B66FqGTJl29V9bZxKrVyBSuhUooACj+fan4xAAm8onUm

/eRpwgRymYAPP1cI5MwEDW/wH1b8O2g9gQFrugOpWaAf+m7x5Qwxq7rumksO8nYCcM4PbgBJLfSWFaTl

DtEH7R3FsMA7Qy/vLllSN51n7Xh9wg1wcMFzHvASSw
4iWqidhRsQbJ/qBwtI7pyW3BfzoS4a+gku/fxNQMfoJ86u/dqy5khiQbQ6l61cP+RSl2/xXS1KjAuFBJ

gTMC5H+n4FfXE4YPScDVQodxr5O4BphQ5Z1Ben2o63lHj0wMSSn5q1ffMn6HIu98uaCWncWEzjy+VF2r

y/e4oAqcZgTBQNhJQeIjDIelOO6GGeeX781rHlRccQ
Qzg35EUSTPnTr/YdnOY6u64eQCXyG1WHC3lJ7uuxunbWp5f+R/P48ljE6ASRk4Hdmi9ECLwkc/He3O7Z

RxXrPB1vozY8yZBJttUKUInaqCclm0P/lf3qZgaGBbON9G45RlFvavBeYSRLDA7vnIYcwlsQyhPPfrV1

wx+f3+3O9ZsfQ1At7FbAXRHwyk4CeWlii+edwOj9hm
K/6qEYXFLqM8ajyhiSxAPwBftf27ca59VQoV6kIL86ixfKWnx2blUl9q81y+8gXAdVMIkslWaa/T7TKc

pAuH/tCTvJdYPt/BDZXFPNLdOOr//qKTuucvBWjmiMBnP1FCvpf2DgB632VYh/LCaxdvd1FPDzUJs73J

dJmoqfZsLMAMHJSyKXb9/+VeDWDBmseeQeJP/uZ8z1
QP15QY5pRKIKk5oro5PEMh0Cteqg5oQ5Ke7corcJwlRnzLVIhUfD9+OShdjujTj2q+7uP6+nTqLY87IS

0weZGNL1rSO8qbqO4QjvwRe/AkKjjeaOels1dCHrM47gO1KPSkTPcl7xcutGtpDg6rt5hOyO6s2hU/QB

ZxOKIFJKiGZDFjrnW/M7pskwrq47Hx2xd9O/hFnyAN
+FiTYcvSmbWDpajPvmSnDLg+EjgiFt2WojTo91ub9A8CjH567uhYleAt02jRn5Y6HxvZ0Zzus1/dFuS8

8poQynLcpTTf49zkTqFF4oJWqwXtoK+miLjuVpkFqSMGUnSifu9PTe/s5Xb+1WhJXplXJtu/GP7Qkb/s

sqUN+WH0uOyLsR5n/j57PkiSn3INAKRSl2JOnGSaso
AP46PeNYmWFCTdkv+dgg92M0tWUKuKtlqCfOCdDE1sLSZ42VG71W+Qlm2w8/a6+AcbItuinKBTkWYRXC

ClXqxwrw4gmqHct8uX8U3/pYsusd8CxEBNqnXygJAJp3RFeBdDhpGg1Uk4L41S7ie6gKHTMz7e9aR4VD

Dt9IetPXcRM3o06vaaEqw2NH34xoBFhk57Bimbip1U
rR1G3H/TfN6ZYRmWG06rC5P6qDKefzuPWn6scNnDF1SOZrXJipzWvutoJVSRqMcecxGYRqfdRjOm3qfg

B9Jmli7kuneouXbDPyCeuvut+o9KHt2unmIGnAHfNTdbOcw4+eEHfTcs6NHOnVjt2bQHdt+cmszI48TS

K/GDmv6FpZfWY3m3vfMrZ8eEHCcFZT0qycBwUIsLs8
MngysVyUQOoFDmIVW6ii0OSI1z6fppDa2zQ9GFcfqU0hy/LUKTCS+nYfeFzlx6VZNsQrUrO/Ry6ML7Gg

fD3AwMimZAPYCeGbkr47ViOl5/ZpfXTL55rLJ0y/NOEF1dox3KHgoKmY8hwqOfMx07vA1fY3MvK/I3Lf

Cik+y6gn0ijmi6qAU78fRTRY9pbLaD17F5t9I0uXiU
qrpdmXl4XnPn01PkbDcgzp0NRu5n65tceyIR3FhXt9xYxFONtn1L6TnLaHWKC5vHaC8k+x0DC+Scqogw

pEd3BrglTKPBgLN+JExDGqxda6shoYYKAv/0+lFlZzbg2KAo12ebih0EOzVJ6x0/m3/AB5fPHWEnlS2N

O6JGYnCcL8L3Oc1P98Z8R5WF2qWd06m3G5R+Lh+SOb
BoZZIQq3OdmUgAOsSq1LTyaZkuLH4JwwHp48drITsFUam5EZOK70rh3Bz1vgzz+HESeo1K4EonCnjcrx

NOq/5MKVRDbIWTE2jo0uYy/P+8ynMYDViG4aTMQyt0Ij6B/J8WzN2SJepfuEntO9fsY7Zuwm83Azit8H

mKaFLyjjmCgJ2PJje9w8EbkOwwrwNjJu6AQyyp2Keg
uQWZbKGzEqNdAKz6Fs5N1+c6+GALkUk3oHCLwu2VOq8IGU40WC7qKWsZanx8DdW2KS6vT2IkCtmaz+GY

ubzIo5+5UgSr6JcwdSTjhH4C7j+m/3cy5uAQpg2uixYr3xQgMr+90titlRyIrI7uzhEbf+MtFflzLKiR

Huf9lExyvNUkbsQ5U6riBdi/Qf6/mEpZjScHP2Ylkl
2VEOJRrKHJ6pzzhCCHRNQWMaoPQc20/B/MxreyeyPxZq9qckhJEzbftyHs3+lV5MoYaSpELsh3GKl3rC

HS4eK/nAYHC6ACBi9EDPmQBsHBtdRw6fbxCH6Rildeh2j2cvySsyNQrU99NdyZGI3/NRTCNi3IcID9Iz

k0BChBtg0FLILYj6y2Wafi1w0IE6Z7D8YRLYhDcuGy
BB+xBywewd7+u7sdEu6qAB7uAUV3GHKTro43D+ntEqSyHBz+8GrGEhHNG/9WYJ4dpGMxSMxfjTWrCCHG

DxSRlBBInwMb6xDINtDZPNnB0+y8joJE0GTmaF+HRHB/fQNPdr83BgiJTUvrSOE3e5c/CvXbzaIFwq37

T9+YwZWduIockSWxh+TdrEhk8d5pnTiW71DhE3JOWV
e/Nj4IrRteGr9nnMjU9a77CAIWseI2jsQmyOVvU6QyFKa8nZT2e1OP4BAHF1viO4MNLcipknMqXGygxD

GKQMUFB5ZHqhsuhssydXpSDgtNH5G5pCVRVr3lt+jA1cx4qTV01NRtCKqG747ROY0RLm6lwoDiw4roi2

BfGhhsrJtS5xKFTRWCCgC6Ord/iicZ0R8nw1dHNCA8
7mTGkLeUSw4zGODAc0715SY6XZk95oQYr+8ySRKDWd1mCmQLzP9VQMBX2y279zlOiZo2jU19CStnGfuv

Md9Wtgjd6omG7p33bsGfBRru7yyyeXSOr9xk3QIQiktVf/gYnm7ePh1IjO6WeV4xAEEU/rNUnyw6CJko

XAMtN9r8u6En/GOUQWYVUxRdzs8MnfWWvEa7tOYNqa
s6iq3O9iZy7hhdnvqxPiJBcRYguQs1pzfp3w9m26+GFTtyL+ntlhFHqPklLwckYgxMJsyE/iYvFqoH/f

/4G07k+wF1bbXJBJHROuRGslB4A8w9HutaygYs0in/5T2liiNFrbsQGgP4tot3+FSWup8oXCPK+hCp6z

ZEVMWJ/jDNRAvUZxpd9X3sDBxP54QuHvlVer1Qfr1Z
/vDcrIs5yro+2gfZhpLmFYeWQql17XKoWp+3+UxGkGuSSMnKZ98CFCtc56K4wS7Ad8xmJhBXSyBcIiUt

cbg1+GJ3J0nhQzX9OwZLSak4N2G592srkAul7qDgWyNHtRhzBAuQ0Pe9MLvxXI5EpyMnbX6SWPej02d5

JeCgoIe//Fu+rf49Vnk3uMIYKTdTQJePx+RT9/STum
LxIFYU6DTHUbBgLKxbfF5fyDj2H8cLK+QM6wh1HJrFMoXkQXFBWQ97n3pBMg4cDwdJEAD+mPjGC0pkIx

4Zqt8GeX8onugBKKNbXBMzOooA4jeL6Yx6Aqc8snyD0hKCydNrjpBtglfQIinGk1jB55WLLzWTPuav0A

t71Iov9Eu7M13fPRg8E/pTv9SFwdrSzipKA8/Q+6Gv
QbkyF8plbumEXJ0oUw2gNFQCLySczLW+nJ4FOwaqFb+Ui+lBdpnBi16J5mPLepvCAPGqv7LwkY629TPy

iGja5zcZ08Dv/6njaDb3yykI3jI/bfTgkQtmlGc90Jm9qGhuNusIMmzvdBxcJccseUKo4oYfw+TP7f8H

x5j2AXGQjO8yiNKTPTRt1nXZ0T51nAgrIrPHa+Y1yQ
YrI+Ve9i5DM7IK0DnYEZOF1j/O5FlKhtoJ/ZrEG67aObu6iSa4JslGNsiaDXMQ+A1Q00Bqy+pVBJ3u0u

hFoZYIiNKS8rfk7u9tYMWBiUZ2cbkgNhoilW8IT+JsZS6wyBsIHtZPS4D5FXom+gqo9g4/9prvWtg0Fg

H6/jIX4/QE0m3tYE3B5tt4l8g4JDzXyzHZ+08QZm7m
dvweQeof3E3fWdjrMOc2aEK+/r+0SRLQflW0kLOcGybUcclNwlLEwJnJ2JZIDt1n8fHVbSMdlut2UWeT

vJOabjKnn+GCFKEzkJmM2dYU8oTBPLnH0/+p/T9roBmjP3SU9IMdFKNIWxQiU67iaSSW/jHxLeE7yS15

X7YX+q2nRsYv6Vg7ABtSGIZNzsaKX44yq83GbY7zMk
i9IdgOFCZqmmB3WnnTe0husBT8Cqs42ABe/X/bk9wyJZzNUx0bcnlcX7MNcjYsPtMg2ypvWT6G8Z7mik

O/a7390G63ozZkXYVWtYkct8O8lMQjO9hLs3baHrShC92oesDXryBX1m6ardDSkMP5gI7dsb2Mo2/nqV

cPh4/pf0bTQgTH+dE0vbG+vpB7twuMLhAG/U5fWIRm
/C8LsEx3bKdcG+JbuZfyS+/I0ytAfrQ5g8CHGPuh8+tqdTSWPCJYb+ZuFKoQNrcn4Kib3JQpYSXbhl2I

JhWgKsM3Po6jPFv5VJSDxTFX8PEpgA2gHe/DnZ0KwDGaIfqZ6hPVWBg77UAUlk+0Ihr0BMaG+9wJRGdu

uIvR5Om4syLd5Q4iEujGmH/7DXTi5ot/6sbJCl4ley
RpCjdelR5cx0EbWmlYt8DHzGUcNVKbiV+URs7LyGMW1kyIr7g3Iax284+sy5Dpz+i8NdR20Trf9lB0tb

haQYhgb1s0uQBxdGz0Ck9grqhHFnLonRV9gnVxYU3oj8DEstGgyTE3vnDlWDNX5IgjeK7T4tjom20flc

Vpblwn0QrX0zMZiarL9yzFKRWV6TaTjFFszsOo2zoG
Ku+gqDRihYkEjn1XoSAle58eJ+o+WwD77WtyAYXE9OPbGLBEu7/htv6Dhm5wZRFlPXvbYjtnWOVod1z2

g8/ysOragQYoA6my/4NUFrhOCqerqKIUkZgJyLItt/E8UsS1tzu98IfYbcr097BjDd/GEdbvhZLSPRx4

Us8/QKcq//AqaT/7QhCes5MH8x4vT5Yy7BvfPYYIw0
RjeOCUEi+dT5YDAa3ExIvGOObISsDapSOQF0XThWfpF9EF3ymq+Kx+0LEjEj5q6A/4lP3QATXi3GaqXB

lkI1XkiJ3EcyZNvVd1akhXXdOfYKx3Nojh4KNjMSKGsbqW18jGumki7s+BlLHGOLNAwtHSqDJhNfuBG5

54AXPh6Je8Zw4yTl4FNAXr0grqJVbiZq+S2Uk6IwO7
GLl5dH7b1hplKwq3SjaqtziIUy/3zpaky3cDEud996s6v6XpWJw0rs/dPeln9S1VE3BgnWgbSFF5LuzS

myNbkVvRPXmMU8liK+EwpsDEjXX/xRYbxR02YGswcDoBlG3mW4gyTsHssdzk+iyAtDWE8nJPFpbXWKmw

2gBBTZtJEI7lppF9gXpm8d9v7YXqli/3gv2bZ9PZyw
AHGdMxD25I5U/UYwQQrWSx9tPMC390daOutv105b5g8C6hEIrYvXV/mytsZRZSRwz6entpAgdEj7SQBF

5aExXSefekBF5sHB4dUt8007B4DAlwTlrfFIx1O6fGy6Ff/JVN9nTGKgoqKMC0MQqY5qKZ9VNdKeFGq8

DJmTHQ2+NKY/mkDXa7J5YdeayJszh6ILVqaKS3pgp5
sJQIRNG+uK5Q5aYrvVMtPQXJv+gFzTUque8XS9uz8vXlOWO9S50/ljOOKtO+8QtLiZ7+w7Q3RVp8cjOI

i7MQ4bDl/96rS21BqFlByjdMulHZM39dcgdl/tKpPva6RB2sWwZ5I8u/Rzwc9xWKi14qtJuAYEElTzYf

QR9NC5Z4FmwjPI2hhVfpD7upgv99+/l15OoWaPZ7Wt
bOFNuU+RW+65psJGdG1Mvl4OkTQC5Pl2/W1aQoxBtPmy160RRQHj05VnyM6A3oL4+BWrtf+o32IJjSsQ

djzO242cMqICX68Hoj3VcCTg0niYTQwXdjy2eKSqbeyHDprxPAEA9eHtR5YuRavp8XGLYV/HPbmZLzFg

gPj94A5iOL+it1BRCnbocubAZYVa3TZFM5eO88XwfP
KLv8rXKL8/HcAA+nFB1yG3A12ZQq90QshA2XUXuZGFs8Nh2CSNah8HX0YHDtpB7eIjhMS46UccIQVdyv

F+PaJW7hvHAlbF4v7m4tB/371vjP3NtrOlKYCitxpE4viJ+5SIJWvnmvmFeeT9fVVGHkcmlpMGUrJfIy

xZf/3OQqKwA96Q1Ij8Qe/ywLVwaZfdbTAzS1a2zTWG
bhBdJBIedbnGehk//ZUqeMyObrQ7ROCXFa5ITHy/nJgfvagmrAWkzuSvlTHxcY/RHY2Mm1tv7vFnUsgg

hXPvwV0tPXf+zkh+bPPHBVwrrPYCgZtXWgjGX54uZDh48OyUEAQDMET8N/mHY/Y3JkuVBjb6rZr0I/d0

IBETW03/4y+tNcZbSpqqMB4OFFIBbL9Eaz4HiSGAPD
mBH20yhNJxa9UA4uqR2NdKOPbHhgoJrUk22U493TPjxNPe36tgYnTe0ffvnowdUFqh++aLeQbrt8qG6S

Wjq9rzGR0bkDRlGOND9oX2cFZaxgbrtIYVi6moCvlzGvKepBHnU6kKq4I3Dj60uEbJg7AgdoqFPC3/As

lhfwOFgf2eX919a1JrHCgFZfXgAJEi5Y5Hv6Cq1J/B
HaPB90/HD0W2GeBzbMNMEOv4JcYx43fyXpsaZayd74f38thwaj347bBXXi1fRHIYrhPjenuG4nEfPitw

z+MFHuzHPKQewY4vmpFTyo8+n2mBh4dTf+1EZ5eE2ltdg5UEiv2CdiAD55qCvaYCUk7x2bFGKHFOegRe

c0IutOVB1a9H/aeuh90Mp0c7sfKvcYae+mSq40TTI3
Pevwii9CiVUklilZF2HKmmQ/ANMrTTYsX5IhQR/6b6dY5YPgrpZZHio1BQ8h3ZlusgLzSmGtYGBOYXqB

p1lWCLpdPrjoav8odfJ4d0er3s7cTY3PA4E4LVh9J/LM+gkVkmtg6zYvgCwQKwvoZ8y1fRtRPdiXpm9z

bLscReV2cWiUoJX3P9xoVJeg0Isk9Rueql97/NNRRL
flrjpQOrspJzCQK3GWQ8lhgg9aVRbkx1nCMD4/Wf1tUisBO3Wyz9u/iCPjKWvwwtHPjYWS3jMW3fyiM4

9ZTzbWgF9DH7K5yrcnqrbEPnC87ma6u/mp1FfpSA6Ytoih+yssQdmbrxgANSnV8sQgneYFO/kKkN6jH0

qCp//afeDi6TR+JdfYyrLZ4tZmuPtT60fn21g2UInw
449oTG2jWLp7G9OKS8f4Cga8KOs4RvwDTnHacRRlkkSMvn26CyTchXoKnc1wo3tmNbx356pSK/TFsaw8

6CAii7SLIVayI0yGcahKWWJPgpxIf8Km9Bnu4SC4ZhKq45pbyduu7/WUk56ls0LDy+BssWup3zLILM8+

OIzpijlsYX6avcPi0ZvDBua020PbsB0BAkmpUew0B1
UE8Hi3WXfEaKtL2NTDF6YSPPrEssLHYH3aBgx71s2F6lwGr3zzfTEUdVZf3BjMsdgaZ78HMFzw47HVRj

Mjp2oLwaZFZPFs3m0l6rUhDffw9kz3+4B+7i1UWN8GrxDGWrFcgg133dDyUwPYr1kBogcaJx7Zk+GyFh

z5pZTIiWp4xPRl+BzQHlXe9Zz7BZAPnooE0y3tSU5N
NHs63PrArXZxCkHHrrOB79qOEf23GAo2NBOZ9XEcDwKJcJ/zuNE7jy3CKd2la3q7LjtzsT+ul5vCfofq

Pab001jPP9vII7AY2WF9ieICtw+dXCUtcUd9ReAMo4HPeVAVxRrwPjSh7/BT/rddOmBHiQyEVxxzq+Kilo

/6mcQaPzGGp+QU1hblkxQ5ThvuekMytkytU9ej8UgO
0DPSZDkj1Z3VRogQLht6bNqBDF+bH7I+uo4IYbXcK+WRqhCt44WaKKOjTeNr9mc9zJJuUr/nOpDLjTks

9eOaU8+gh2S4lB9loBRP9NLRuwVd90dA4C0Esz9KWwndNqa0CwYf+VQX/av3FO26CWyO+M8DXeeYXq+a

WElrkjOPxUZggB3/NbtoV/OtcpRkXvB/7q68qBKAC3
Ixvh9hz9ZCz/Tbn3ySsKqEHxHOo/meZPjj0r84v0bLK6hTgboKwL3aAMUaV3l8VtYFxtpBzhm9rNCW2a

/egTLVyyf70ZR2PtEe1hK4vToZ6WmJso+9e3h7fwqzqeOJH5UyXusvusSRBGn6c3MYi7Xy2mNc3pDGnj

Qtu/wIpQX5LxYaF134zQCBG9kGd9aQ0dmft0Sn1aNp
ateeLw/D+6fzFs6pGrwOKWQ23qCJOq3kfIIE/2FDj55fmkTaWoTEckbMnFJrFoxVfOr3uc0FG5LXIYI1

fdOWnQ/De+uCmXbyvLpbzpsu6Z0dka94yBMniXa6sLEHS+3a+VYVyihUl6KCwVu0lYXGkpFXdHohStFd

XO3GWzY8b6iQPkNfH35RB8nzu9/X5M+mkYD/P6MJ/w
+Mx7gtiaQABQArzs5dygMiWD9XTVzRyVgDaIjC5IJ+liHU1uYEKRBsLMjZXU/RDfVZfZ89kZlc9Od6Cj

oVmfThH3IqLJYoh9Gb8cEVkJ0YB5+rwZessavFKTXbyVx6/Fffab3p9+gLystdUbZflSeO9isB5V1yFB

cClqOPnaYFczNZHVrSPxad+p93Ntig0D0bJl+xVOq0
9p9ZG8r7dltVa9wNsH4whDs0LXCtQDkMw/rRqRU5MpZS25eY/uBA3AHylz/o0Egwj6r8z2LQTwW5yfmd

hOxPL77qbxjseLv7C9DhH5241OB/Tkx6o/LibNEjfe8AG9tSZXF9UytrIAsLe37d4wOBy0JZMuOQ9bwX

LD7XtIWBTIKAHfG1h4uetSh8vHNR3MfLxm+fihfXkT
b/hTBg8aqnB57IB2WZJuVc5D1FsUEU+pYYG++aO1HDJM9/9WXU3B5RmSS6vl1fjBL/AxRajQV+QlvQ7K

Xg/ZAmFJJhfNQWH043O0WjddXvzA7RRejqeNcB6wCKPZpSoQEtgtl4PLyp6bRV/3KaK79ltj4cvcUTvh

MLFzdciJxh2XTVBjGMCRMO934UGTmtIfak6E3/AYEw
BS4XoP4+jwbsuHRp5xxU60Rfy4NWKNGadYrHQ/TFhw93iRPCBbygbx3XbOhgN/hRhh9gniFCykoiOCbu

fNRmU/fxr8c104U8+EUbXFQdCeK0qx29pru8hDVBy3/ryx6Td4LW/RV2LRAqvUkudIeJEGYId1BatluQ

oZkSPJJU/bBdxJD3rM4ud6nPT5Vu2ypgUZiD2rhDk6
TgkpLPvNyelzM7IRuFNjefABkd+o1FdEu66ZyRU0/+VjxntDjgdUF/wlTiEfVIH/x55WDeh3ykdCvRzl

XOl98z09qujPtZuAqV0JXbaWV+4B7u4WxHKH0SY56TL19JqHDtxFcX5quXdQjll0V5w+H+5esh/qHdqy

X7bolaTMXUefQLFbj6g7MNe/nlfoLLsthx70CVDVb+
um5smltHl+VFkBduAzEo2Zz0KiYu3weVM3d33nnkM85HD/Wp/wNq7A7tmWxXiYBSsVj3/YLpcjESv8aX

Mofl5I93VXlh7r16N9Oe6gjebYHKrw9M9pD1imHZqxlv4cpLlIAZexA4J+ehzycoo0CxOfCagbtMsuWM

ADWKxeruH9WKhHU6q+zyH+r1cyZ9hRyi7PruirEcsp
S9hi3BtU/mDpBIXB8cNkv/0I+E8X+i37+0G16FAH7iIUW8CWNbHuLqKgBJUliQBOdkJzciCwBsK0WYwI

7LeLpJvWHYq961w6AEzqmmYwEttD8zfX+R+G+f685//k7DZElTAUQibv+O7uS7MeqiOi5AzwwhFtNEf4

Lv3UeDSu2qeAc2mRngGirU4Q/vQ5+6RBJAQt0R10qv
Uv0W6uJM98B8rRJ7RIlhxk090q3Fq3xeCvgd9d8rv622eTb5w9l4H6xTuq1X/5Q6e+yNbq2kZxfDDL+D

RAp8nMyBBHag9xU31n35THEzRV+Zzs0qrs7RL3H3Uivcgxr9ckflYgTUhyl/oscBsC53Vx+gHV95uDEw

UxIeUEOiEGE+c8kFChJDM/+q+bnN3YvQIbkJNrl+Eo
8Ev6HqR/dVbjq7KQoGOGDXStKdglO/YAWHpefVy5xYe0vF1SFImmxa1ei3Egg3u1WOphZIYZPIVCiOAq

GoTOUOMf1jqCLbAA1g+1tezORIu/ACp7d1PZ/WEQ4g3q1jJaUSxpkLHA/bQIP6P7LOlfjUvqDtUAO4OS

iRrIw5ObBsrpxt+yu2FDFntNvxvelqH9pVyPQcXIbi
Zf+aGePV2Ze94Shu0umbIlAGAd16BTz1k3hr/c17Z/T8uL4SESiWv8pSWTkMNBRLj20j/E8EHaWE2yVc

V0tVspPpa2EncYFkZVlpBJua59uPRywBdJcMn536vzS3zsTQkP6Ycq7IyFM9P1Pilgan9AL7cgRGZ+GF

flj4CH9BCk8nKe4IR8CCFfprcCjJYZE12utsLGZHkS
FGUpd74O6Bl+RSFPpzUjgC/o+BYGpFI3oQr4ULZ9yOoqgq1CZT2fw88e+JU5AMq4IYmG350qARfQkVqt

g3SFnN/ZdB7WHtlDkfTGQOwq0Cn7wfnay6pRvVCHU7I8Wi4g5iRizMB8AGU00jhr8IAqcW0Os5oDCgOc

IbQlEbKTSP4nrHgntlI/wgmoS6oIMORkiyjmZ/AIO4
k4C5xEfcm3nrh1L65LrM0mmtC7KZBcambjzt0t/kseVr57lDfreIZF211S6b0hsu3tBhjfdpT67VMgIn

UO7v2V6GeW1ia0Lx2D98lxuLHyDGnNGA1P0HY48mKKJ+x+gDmub4WZ9Fyr+hk6m+mjgTtKifxXie9Csc

zTzA4e4A/5n2q9h+vKRwFPuiPTEbEOiDovIKnIa3AU
76wrOwiqFMtSI49VMRYvqx1bVz/i9Yesu96NgkhcW4t+cKgrcvpNPIwueLYgIm9ncpY3Q0fZ/cmv/ZYu

oPqpQIxtMou+dok0QBz3XQzY7ageZa+VbOEpVOYYe6ieMtnbrmnyzj22mB//Y80rXTb9f05gItApkedI

yN7NOOjMKpLRlQluKZd9p5VqVBSFQAW0G1t4ODNySU
YKN04nJKcWsm/X0Q9lA4OKL4VXZnxCDEMBzc1WY4PmSS+bOlEUSCkI7qm+zS3CsqRaJw2kc93QFdrjRM

K+zWrs67UEkXSXC3ERuhBirnx0frR4rzvw4nnCZq5JdBBbLL3DO0F3xcMvaKofieM2lJSQ3a17Z8/yZi

9JrvMv+s2jizCdnA3JohnziucAJwqLPuAsmzEwKXKG
i9J4kGUJBU3Qa5rQPo082QVSb52IuPbiGY80U80cYn1LJg64ehxrfoMF29dqujLXwmNwhUpWFvzOKceH

dTSX0N4EU9Nkag2uqXFk/ABc1DPjv+N3kv+69XPe2I2FvZKMqWgdd4rtTu5jwLnRwTF3jr62N5YiEM0n

rc89P0HYIfzYWBbX0yL/aZvd3eG4O/tjtXR4OS7Tmw
b+ka6sGpkwUqWshHP2wtPdpkfLe+xJ/F8UYb9gimg9CmMuKGLazPcAX/59KpE5EXjsFSIyf/tq94Q2CH

0SXZI839GWSNgoyM3eMQ1DmtLtFe85zgEwpB5zCJguvYgGbei3+dhWxCj5/w147Oqa0TpcpDCBZSIshc

4lI66hF8efKmIUlFV9cKFbCArT7IbucBjhnU16Umsm
uypYEAy9Q4s/3sTMmH8UPcCX7+Lig72WyO9Z+Db9U/q0BWfg9SUI9IOIsdtJCNMSm5tniCzkHsJNGJwI

G4V5l8YMhT86+dQ8cdnXpukUuUZsepugpEsLnqkGMEqzcXEhiqbsQAHEqxhDkNHNy3OqD6BhBhaKZdkK

WZxwHNzvRn34k/bN9zx5fKFZzkz+b5b4nW1J0K+88n
zz9r58w3X9XYb+5Ya4nDqZI18KVcuzYxH+vcRPA4MhlrV/W4WI5HpEBQYBJRfsadHTXUiez1V5EhStU2

r3SMOM2Fli/ais7SbVwkf/XoVF1gsYlA224KaUibFuUFbmNbKWM9qa15MmOkni81e9acKsBXDrPGS6vj

dFwfrmh2sfkA7PBlk817vnt87kouA2bnUpj5EBXmTl
X0+0f8cp1HJFrivaKdriYIgXlvJWxCaRyk86Ss+GW3pdVw327+H3vibF6d5K29YceJLhj+Vx+ohnVvsI

dYIzdH9nMQsNjpZ+bCH7mND/KQXNBxnkICh1xW7ojvQp/sV6xijCYdw6tynu0H27UvB3abq8upt87AzI

YIsnujZcHOZcc5vWCAX7d2RgBr32GV/pHJvVwSwOwq
kR/P4HvfKHrswJOwY6aI0b0v82fvJvitBfF9cRw77+kI1nyNgctZCqBebdTtelwTgbdzsYPmpoe4uSno

vVza8jXxUk0KRkrqQ8hp2X0IfYYoXEchYTYYg+dOGoI7eoPd0gloS2M/TVBYtZbZua/2Sx7kR0opmGND

Ktpqs8fxPOdDA7sT/mi1Age70FFwVIKpBZULvM3jpD
GqV2qL1YNPfTblmajxUo8FKAMF4B7aenZ9ovQv84ypqUFvstVc9DHssuMMSR425EGPiUm1/aq7TqbZ5H

Kaah9lJx9I8xFGbq0OPyFS6gRG4sGp0qImZCTw+caOurqKO7I3ErEg2fl9g30+4mDlkBhM9aDx1El8Pq

Ahcv4bl/rwmrIAIdZXoulINhQeYhytp+UPNModY9CK
wuNvYx2HlvtHCRJ06R25TCdaXmJ4Enj375Ywkk3WhCbBu+W3Ik0QlX9HMQteBMJeK4g4QSV46/9KMRKs

n/FnQSSRV1j3LGzr5vHbKeAFEzkEsgX5I6rnf9MKO2mr4QsXLj4lCl4Ow0fmy0aSt3l0yYCV0GRfFLB7

+rTGtouciKeNDETXEduh2xmUv+vwf28wxySt3eXX9G
X9PFEQBKgvY4pU7xFylZjb8pth4DaVt54zUYix7sjmnab8EO/ilG+L1gpyWbibuu5csqNFZq/1ff+3Et

ZHOoVMzhkIlBiGy1SW5m+WnLmIRy6UoFPuwplvqF/upYnNKBf/0rEDqXooYvC3Ry6PmRELtc8dY6E196

JsPtx2KjgQ6sFQA30b51LKwP2b4antBRSv/MlCkc4W
s6eI1n230fcrwHf5OCJdKmPRkZ/jYLzKPBQAS6ch9e5YGfH8Vzj0yHKY9Kzlmu36XvxeeAGPSGJhxKij

TP2fkL25NJ2S/nzaHD6ZDBEVV0jIduHCUnbsl75Zx6H6r13pE52J/vyZGz7TEFW0LvKgrAJo7LwUgBGI

qNB4FM1+d4jgLpZKYFgrECxaZeixp3yEn1e69BvwIA
q8aFd27h/21Bu/JbEv8YNRCZXR3dgUlwwnRVhmeoILDVZsSpsJFgsPxR3KTVEA7dQXgndKYwF1FbEJf/

VThrUHe6z+lKiuRF9C2GsKawVk5tOq1VONx3GmKu+uFwQ7Z9fxg7re2FVj5pzCdwaMOTa2rGeO1c+56J

avMy/8xh+d3PiK1RCM1Mjk+SB9BiVoXQFVqSPKYwtf
SQdHRXyTwE2Bil3svuZuz6Sv3NFG8BbUN5GIsYnSQ4w0gHv3mP7Q785mwsHyVqXAcK5Qa2Ki8KN9JoN7

fkG5FyQJinOpgsRrqDlofQDMxlMAO7r0b0d/yq+EcW1D7I4Y0xU4usCYS7++BPMyVFcioTapMPTkoYbX

wQft9cWIFWO92MfxfZrQUVf6bwXurFoEr2lbC0EQhI
8/vTSAFu+lOZoYaUJEFHkz8QAxuaJlQSFOlwyPAWcPk26hzcKIkk+qx39we8qzztJeqKFsPfv8PPPwra

5LA0gxFHxtTie2r8CYCERnzWSfFnreEys3C3wKP/3N0IWKWcQ914ayh2Shd0A1P3wZu/bZoVbV4Absuv

fb1IzshSfafKk266/omCBPIcxmBruHJPsTcqsGSYRO
ic/9Wc/PRvxGtiIahEkCaTrsvbKUvW/Sr/5uk74a8TgzGvIrRC4i4QMxtFa1L6RjPgFD3tqctpu+cVew

9DGf2jwCu/QzuEA6N3AWtUW1TJLGjXYGucGAJVTr2Ry0ar+9Rs0qrP3ie6sxNzey++yLmtSDu1Vlw4Oh

Lzy9nBOOG38bUPoHpLJdYEax3l7SJ69FuZOLrLm3Mz
ubCas2DiSHoRqlmq78ams97VEDAOCfGpfRrGZ6op79a4Q1nMnAM1/1TD7bpRgVEGou1Ho9L70OKqpWs1

sYgmPH8l684nvXvhVrw4qjDAS3N/h+S2LY9URtAPINBbLxk0ou9Pqzl3jwqM+zK6lmwEKFvOtC8wZ42O

nMx3POmUOkVqmAE93OhLWhRmiU2tld771wOlt11hyD
Z6izA/7+KVnjk5NIzMyvXIBIKypldl2+tVzw/DkGus2U+acXKf3Wp4Fc1GVBo3Y9QCWR5My+/tNZmitH

QS4Vp3EwnxB53dmSYi5NI4bAjtwYf2eV+Q+SsNh4SVEqQcXgu06HHhoycq1hJjaWBVOVPNg+w8ZFKmrA

ZZXW08h8qwKf9GV3Q6TDpIL109qeSbzTxqe2SEXY9k
o0nBEQmCXtP64THzJe7n0vS0MbRP082Uw+9OdiPJcGFg0SMgW+otvP7c3etgH9f4UlhO25YT3AXhYuAG

ZUnQ2BsxfRSl1J7HCO/hMa51wfUnIJ4Li8teHZppVtWq217i3/h87+CFnYTvELjqx+30FpGZRgv+vfmV

WgbRSptdu8EWC4uTd6r+JAvYgs5feR9BPGz3Zw2uRP
EYEMhA2/esMPMotxqP9dVVJH8geoQUE7nzWxrdU07x/PkCTyBZqkZ3jP+5voDC4V/N2V1kH3OonO4AgF

XWm35R+nMWNbMyinfhIwdxYd4e1So7jpY3cbZEhetGm5c6z8w/6LodKTZZK8v5AGcWxLFHXpCNNhRII5

vXQgN5H6jszgljA/Tt0v5bTtJd03PY7zSdDWLOMTi3
lXFGF4PXZPF0c5hqjufY0KgO9U5DHbwVLma5EXr9yKgrT7NP5UPvsGLAMIJ9vdZDdmGw87iypsInf5HC

Ww+0FD6SIWfgILiS7ELgYU8vFIU1PTdqLe8iUUqhWfODlZiDGNxa19R4BM3llUteSwu+OdHHYSzLRTCG

1fkA1gRQjy2Tyo20a6xStb32f8H97FrF04N/qIbJe5
AO6mAt7k2s+rzOmhsEPiI5rEoRIlj86sHbdOTS1RdqbsYxqGSzERTvaMWDGje9zNhV8njHF2YudcCO0h

bBgFpcSfDAVNQY45XBvcAAyg52bxW5u8QueDwlK90b3nAs+62U8djElZEDXgD46Ut2E5sS/Aa/7gcnN7

DFNBuh2mEr16XHusfBUip4pcZ/7FiBoD+PoR8E/Tzq
iY7P98hV7MMEJoD56WrXwSuSVetFGgeXnUKG+CWsPR/QKsHNdE70NG8rEWs2Yvo3WIEcxsHIi+QqCAvl

YtTXSoSvAuObKQDJQCPFyxdTVXN90V+ebEJ5Qkn81QEPyy4uUMr26VfuDhUJ0+yn+Tpv8ME8hw6OOAK5

6rYp1NIHHhAWAmCC8D4ouKLAkd9PPlxAS+aJI50eGu
1s4H81f8C8SX84r2tTgzo3Y45KVhtO65ZkAlERbtzDeId/kjItnVA8OgWAYzE5RtVKA8b4aWi86e2+D+

oilgB6C0akrpZLp2hGaChB4qmsoO2dye2+ePo35PH29svVrUyfZNccVTU1DRrpyu7bD/f6S4jvzxFtFh

MoNVWnqco3lXUCC4t7ELXGQO6k+Iatb5hDwsEb6ZhF
QY4UXM8s9aQ/QT1ORqS0mkBqwXw2HPYrchbo2PVSE6VKS0KQGiYE25DVmedCvW0KO2Lek+xLgfC1yu4X

3a/rD8THhQx084uQXnTA9w6jFZVPgpLtpvw79cm/o+qt3U+mGL7TTtpWxJgr5zL1BMdbWKg3VGrCe6bf

E1J153qlZE/BTkTr5niTndH68iO5Gb4rMkEGW88iqJ
lhFLe1gNiPmKDEXTnxuBP4Kp/WAJ0sDkPT2p3NWUKVZ1gR+3DwClzA2U++XdCzi8MPKk1G0JGSCQE1OV

JgBFzzAGwh/v7UlduGcPGBCvN6Adokhe5h9xO0A3kqF/gItSMkFB85GcqhjfhLsTaND1QsyWsVW+akoJ

o8vWipvaqcTyJFLa1758nWzGfyS+edMP9o3l/rnhGD
6gRJ6usu5uKbC8YJokJfyXkCbhdOEFjAe7SvDqSUkAMaVH5Nx2lu/9af3+mKlhMzS0sLmv+2Iw0wo2mf

MFJlz68UWv4S96/9d4IO4gNSGAHFS8RsGTkPR77F5j3mtxR8rbeSwrnnrTIMNiPWOLZTr8SJ5jLfnRRz

/vyZy8emrenbh/WI9RBgtNZf+E4LhWS8prmxYv+Wd2
z5N7kZJvuDBRl1GM0DBIGe8CTMgYt71d8XYrodg1WDZGVMN/bhRvTf480h0JQPwdwhrz++j0BAgBYkts

zMfwSDTbX2ykvcTxFl9LQ/XjmfyxA8drCFRvVccFaw6jmYtdI0KDv3r85dKfSM7c4iq8c4ltg36yxn4L

4PLs+vDWbooT7bz78dMDCGD9o1Yz1wPSLdz64r4m9q
KaOep+7+fKcxy019wyO+OHO0rJL/Oo/zsODE7zRG/jkwU0+THhQlCUPk6/ISFAY/epq12l2SplYV/Sjp

dd9XUW67lsTetopIMxV+F5xhBn5BTsY6yce9nXEkUzieWrW341cD5ERXV8HH6LoyAk+chSS7CHsyxvXy

DZxY7DWS9yuSRsDj3e8vSAFlf5yQaKl07J/2LEf3lx
UnmEvpld7q/7vXDEdUAxxSvLCQez1M45I+mQv8/yZzVz7xULu6MfKN/pPtljiON0dkznwSjOYbzHYrwj

uEgR5S0JXh141XGoZ87wHhFB3CCDDsLyoAOSM3UpLyC4xNqe+TVp0oAsbkaDg/v3ytg0+QDpfFs7iMva

YUaCubS9QwlD6B4/POB2viVcdNROFziG5KR1iQyzTU
OFeS6MzlB0FNuaYU8bzbrYvd7271lEAMPXWB/ojM0WUc4pO0E12n35k6/dUVT+BP8GHqAlb9NXrkQbnl

YO9Ab+acW+bK1aHsNxoHmZkmc6SvE22BEHkKBQV31pmiu9yqp6rs7iwgCrw0P81D824Xsfuvqc5X8fn/

Q8bCttHpUuCyZN3pEEnPIQUzYi5DJay5gpcB0faOWP
vHsethE2M2Sldaxp6TDU+EGa2FlOUUpH8Qa8vaIcyFl05Fm/B7TjvU4FUev3WLkE//wG09BWmEeRjTVX

/FOa5g8wJ/KWVi1v4gCusyFAG8nnCS9UWfK5KrUa5uIDXoxBTXArWx//KrNpW4doMju6z7x1aV4x/k49

KW8+ScwSEBrwbeivsJuoqy4W1t2ENiBrdr6+PhPc5p
3inLpsaiq1tjTJK1vWAU4l0+QFCdMv9wtau6xdOjY+neZd/uWVswyTU5oQL/QER4ePn64Tusvz7fK5PT

+4l8oOynWj8ci+KPE22xIORYcZnP0PGMf8ujOuQdZUu2jqHUlT4rB1aAycejH/5HKc9VbD+KUvOiwdCf

OmIPl0E7eRz0orGX6skpkM7ZS6a2fJJPfiMDuKrYdm
0izqs2cr0++CQjRxiJWrrggJ6lchZEhExQnenwc1Gdr7d+zC+6aByoTiq0ImRdBzFdwnYIukfEXslyW1

09ciS2L0uqh0/sz+GtvPx+Q/jXwQ4iey39yotns9FrmdIjeu8CE1MhoQT6fHNDa62J+16CYusPb2MHOm

m/tud1eA3GDrLXfFkCs3t0jBI9Kf/qdz+vZ8meawzU
hfA6Qp/X3ddNN5Yvfzb1mw5kOOBVzRJP6WFiuK84wEOJg9fciZrZed7raJ3vjorzDzMsxlurlZfGZNF+

HoS2aAFX0MF0nEpvgp3Z4wAowExjrHlFtcpJ/BrgykEl6l1AiyFlsx/I5TQS/SON8/VpHl4nkVjbtv1I

+dR3wzo89gMZV9ZGn+Wn28xTTS0PVuEciPOxEygsbP
SD/lHE311HuQWIkfKQufJOauVv5BmS87ZZ0D4z0f/+Bc8mte7iQh7UcTNfWn8yTWPOi4l5tpf9lIG9Mo

PPt7jZ+6JcHfj5Nfu4bZB2h7H3EsNnCu3FNx7I0VLWLb1nmVGIk+EUMmdvB/khu8Ot1F0v4rBrdsgYc7

cogzay4uH2Z7qqmk9XPUa2GnhjmywogCqIZLSmV0h/
9kz03Z3q5WCwaov05O4Kh6EczSdy0gtfKy+KdlPMiw9/AJIsVGN8IOLHajFPWjZzBct/htNOC71K/UeK

kSibCD3poxpy6tUOKZBTVnWssDGLc2eMxIzb3rc/Q2C5ZKHgAw2IiI3zswJRJcpAyvNq7XwHbkq3VfWY

iDahmcGURJX9uMEd+vHH5XLoUOLTC2RPG/8eM2qT80
trlYniVRLjpAMluhHycb6T/bKK0DQiOBVhxAcouHTPaA5bCxOzA6czp3c+DdARb/LuMEPQW6jQGoLmHC

l9moWByYziWym1y3bA2OQ7GJUVynwQtyrKLrrHfJ0DlfT99KycW5UxjPG3rev0KUOHyEdCcE/920Ta3b

RNybQmqfcjvy0Xy7Fm8sSd62V019H+dNV5pLOyNQMA
eA1AlP9Ju6M+1v7f6DJkxvJk26BwsGnQCV+LYAvBls1pZ8hvgC8oQgzO+sd1ihRa+iKbSDWuvUc90AK4

jvAL/eD9x+gVNd08xO2kKwoUK6XOtD+I+m9EWlMbQlHDkWjGzWhWTZ9m+GtPEFkxFWjHpbsvrM/0DGcd

rjOhaMf853WW9Rn2v0IjGVmOeW73giagcDE+4bJc4C
M6stF2TYMWudy9quyE05Y7L/+u6RPCNz6oNx/pLTFIziDennvNp51Gk3tliudsuqrGLdltJmQUTbO20Y

TLCbxj3wPob2o+zv9hWcl/XfywDbHeD+7NBgnZ7b9vW+wENs9bdpz5lXoQjD5RT9g+S0UrDV98yNF5Dk

9raQC9c6jk4DJ0jxAdKVdOkY6jpBMGBa7ICNtOTesI
k6gxw4BBoe03g3Bks5kYzm9laJ2HH1BH1AHhRWsYL+KESRrex5wIdp8kHWOfskCIz8U7KDwgUVCTyAa4

slyZkOGswGa8P24yOcehFmU7tmS5y9nbVP3te20rtXRZjxw1om77fbyP5YqBqzz9BjWObuzeoqdkJiN4

vSV0To/Mix9JHwjZbstUDwMxFyGXRkxndsYXElTl/I
hrqHqxd127t+B47ezZ35iaAuY8/gESowtG3GtrXtZUZ6vCQgMicN5Ineaw/AMWjii5CX8aXraMkbwmKA

emFY3C0A87dFiiy6z/LKlIHlnehN0g9Pyepd5MxZC6ds5ZE+6NODmhQGk7h2w6RjUqpRCF1txCvTB8ex

cmPFUHmv+zdfy4of7GtpyoBhOpdR0hsQIgRby0b8X2
mzcbClMpF8W1oosRULT6b3udcsoliCrmge36w0kMKD5Amy2rjtbP5Ep5xg3Fge/mDBgM6FGeVuZvodcU

YK26aDxg+qym64khLj6t6ZMiyxWbPql6ak9V6bmA2CcveAsL7IeVeyi+hOEZCaoWEGJAUGqRERDu/vTz

jX+HPzo52Z4/q0052hOmof+8sij2Vg7nhDw4TZL+NM
Eunc1xMwo1C1pN0rEysXEyO//lmLTFO46g3fv/YKxDMMF+X4D1dD+qHe0zOll9H3uQuJqgO+JLEvcDDF

YXMH59COTC2bgvjfN1CHV5kAf1+Jc9yLLqdag3Nezfo/hDGEtHK09fV74e76KIPGn57RrV5Zl5WgPc/9

TDUh494gLN80/hmDlZzieesfiTSqX0sexSPjNFLEEp
Qf0VZoSVti4bJsX9Wd9vzZZi/QkZImnRy9KgQRHmVPXdrJZ60aTuaiUhyQZfsG8eVWH9PKgyLLkzmQVI

79uOt85taq3XbnHzO27Hsbu6oxfjquxWfzqWWXfyKYJkGrBYuUgHeTnupgWd+S84+ezemRFc2N1iqaqq

tMp4duHLLB9ftgZiW+yZAZSQezcyW6H8SIXuWBgrLg
mq5BvNt7LhlOunFpCi0xnHot63yINwlAtcgF5Ywo7HB/NT9i9i/HtIYUTkycI8FgOnzRTfRJFyo4as7K

aFgTHcuoWrMaqcT/fSgjqRl6EETQUuYumUQf8OL5439Q/R++koGsVreEK9FpM5Ov9iQBaviXGFl7vSdH

ZMyMS4hLsp4gLUwUJjhVWpC6mm7WTpcP9H3zkqTx2z
0FfWHZ1aLwDS3Glzokp5TgviHLGYgetEuCkvt32rMN9caeVbwePUx/8I/DvDbbxj4AK9S+pEKTFCffae

IHkuyurqFAzGEzQlrMsPDm5cRKY8LuMptaVY2LobKJp5BIdFLRfZZWtolvQ4JTYRmwQZWwnfhpYTNWBD

A31ckSidqZA7CBQxQBhxLFn6UU380FbJi4prf8ble9
EK14mt2yMhv4fHe78XC07dldexQ1MgZvR1NJSNUkgcFzMoFM7Nr7NkJXN8gd39x6FUJQ1x0w4NaOR2zH

SAxk2JBRYuNo1DJMknE9FYs2qa35I/RYfrApDAZfXsxc1fvDxbxzG4cjYFkF4PTfTmX27a0im/IQO0wY

jAgYF6zsHf8Ct2vQLxErpuRJyHYlFK+OxaOrQooDBi
BkByYapMGRak/cpYEM4wwWkMEqZSVHhsAahlwRLmsUDjrXFOakOw3cfWWaUU60pl0mnUil0MSPKTW+cX

Yw6lLzWFJBvmbS9LpyqaWWi9FFvJR7XFziROkQdiPL4oyVqefHRRCOaG8bnIcfIEcevY/TdQ7M5vALBq

43XXA3z1APVAJv4ghb+PcbYcuivhs5OytLfbk+p3SK
Xy1iNW4gHf7WTWv2Jyuz7r0dUaqE0xERllZhtRTxX88bpFeQtXyp5I3juWWbHu6i5hpQ54OD6cCPrcVX

zBwrG5P/+U3YL+w4YE18+AKaLVLxf8v4tUvpdgO6/Y+AiLER+JN0O3C5ID59O/BMnq0oMMoQLFezh8Fn

6g49kQq/4nnjtSYDw1YWFOcL5wz2U7OiyZvAXkGAnw
Lra+tggQJk2wv5m1z5a8nA92XzpVny2tPZ6bqV5TuR5QUfxdr36yunA6AoZ/+7vF8cIBQs5/lZcG7qTD

bnat9W5HVzWuMyggtWQK3WG0NLHePdXciG3774PT6jX1omWsmfnOJTDvMfgj+a2kYdLg97dVTvY98dAN

xsbIGFFk7kmPFRf18peLGf7pNKp+LB6bhQ1bsLt8CO
EW5lRrbXF0ZKXqNkf3DYH+OBChr0QQVujex8cdYP882riD17xfcZgdU1AHCk6M37nhBvhaU59+RoJATc

HD3yaHLGd3S2CFv+38lXWIADkz/j5ZrU0qaQ/AEDxBOYY3Z6ElybA1Su0yJliRLkqwvxruCGw6iNNM5u

QXW02MCnpfQCiNsbAxkOXPRU1eWtt53JmuGRCaYxB4
aZa51+6QK1a0uHgc9Ynxtupju4+eAc7aCWFBBfhIe2e8MRGcDPZvRDjDurYDxBC34bL/FM8DzkCa+we/

mL2LPDUvKdissobsoGor66dmgPM9PFGXTZW9aU1eh1ZQE5bPC43FSv1dSvpDQwgg87ir8n95aNcricMw

7PHkl/971FFI5te1HOu2CNm0nyuXc194KXUqp/1t4x
tJFmHlshRnL7y6hLWp65raHqbkRvSajfrUbMOCdVTMy9THAqKFECYZbii0RX6CQiL26v11RFTDst1Aj3

ahsBI6GEyaKNDd/LPee9/1DfRYz4FglGerCExwelIkomavh4Nxy3FC7itj4t44X6G2PJCM2Se8ecPoW8

pdYv556wG5N028gYICfsV8hG96hl4qE16lxVHK8toV
dcdSy+9sbJFBW30uHg/Cv+10d6EvR8UZCtQkVlo6OQSd3FectdvmFmiMDZBjZgJUTrHwEXlkiBj1vXcL

c/VD3Zh20IvYd6QHOIn1soMepF2iMI1CNsbz5N2DO1T5L4crjlXwiVVhOyuf8iQyHy/XDRHZK7w2V2wq

NdmarURoWZdzcvYeQOTWII8cDdrlCgHUDKAHX8uzsH
timGK51t+mmdVF3gmeVT+r9CLi2TOk+WeQadJDy6NxdxoIyal2fsiM5yzvZlDVevZ4WUY7E46c80MxJi

Jqybl4lcUc1poZglMZkPtgZrO8uQy3X0JL3YvyfpdftOd3BQAvmPvliAtnA8D807TGZx8qvpdWNr1ml9

kvD03CKjnHt0zGt1+6y95/ijOmRQpLJpjPJZF9iaVy
BCf2i5QsQw0uTm7Bxn8f+nD2vUWjF6A6bh4VrE5TB5CJ+sG9NV4gF+pz8Rye2DJ5w9uSY+28kEOrrmZm

xd7iEsJc+V6l95f8f3BtfJKFt+Hobr/B33HU5tT7aJ/iLnPva5/dl0gLnr1OLrfzN5HBTFkesZDz867O

ENIbPiI9bEP09SB7RSVHPyqZffGV59b/uXoLG0lWM9
ZHeQffeAmTwChHbV4lZZijZ99IIw5UZVIIuFbhUL32ySXYtKETACNgPu7Ao88c570o84EBY0S8j7cqEd

AD1Pt4pU1cRGtRS/CcRVGz66usXcS0BjclJ81a3v326bFLVayygfHoJ+yKKA0vcoyGNbboHNQBbqDsJT

E7AV9DhrWTD290m62fAdvCXeMNbOQGsZ+EZus7UzvC
L0lWImJp5sPwjemsl+8mwVA2ZyTpImHIQHMif2EmdU2Bro7cFObADcgHCaj+pcwnkXHju6i7E6L7yaij

SQJwLJcsA6r2xqHhBPLsR+xCpwNn3VPtHn0BAKjCtqNav9wyTOBUJ7jmfDIjHIFORTIGBp65ab2w57B2

7k2RErcqSCf1QVEQkshRKsHYeSts6LyobeHt3DAthT
8PG7NxvLr+alc7X7i9jJaHvdikSDRk7MY5RfCY1XBdVhlvDTYesaffwiWKcMZtCmBZQQImhW+pO2EoRz

yI6kV/gC5MjTKxpXXZDhI11JVURkLUPK6rfykL58/jnRlQCGQiY0LUopgxd91dNYAM96ML6oR2LygIha

MsETv7ggyyYoxBuRYAllRsRr6W7vGK+aod/gpK4O9y
E5n9p3sK7LbnMLgU7FlVeJh1N5uwACjgtmJ+f8c61VietzI+CvwU5QsWo+okaCKnCUkGgA6BLmEL78Lh

gN8Q/dwo6gIN1UphLimY+v7h5Gioo7RqouztB14bxjt38jmpK2a467o+WhdvQVbA9Vbuh8fqqC0br1yv

CamjakyZgxGf69G7Mgl5Hk2VEeDhn8OA1zU8XpPsJk
LTYWN3KiZCI/3upoJ/Abena+wA+NfMAUgLWUPl0rCTnso2C5j4b1m+55qak+24MvhtN/l//pPLxhsu2ss

j0iAQeyMroum6Qbz2215Ffa0t9JMYncPimbotFgL5Lf6hDx79yoHLTD9XyvmH1iMHaU0FBP+qgbjwxyj

OQMK55uV36LSe1DQ/aJHtAfjER+H1N0ySMUkqPFjhX
K0m1/pMDhu38FT1X0fQjak9WvhgTBm/bXNMQknHR+v0HAzf5yTDHWzWfORi1j8ZXA0PhPZhrdBv9ABC6

awXF/OxI3oKJnBUq3Dq/I6/SxZx55nmZ9SUt5apwG/0VJtmZqM0elBDaw25qy2PBGQSKt4J3Em+EcGdk

Tabatha+HVm3YkK/coon/ifBnkwQwQYLW5T9yz0Kw5Hyww9
2/oOdRQe5OsrEy5Q3W9Ee88lY9oDMDu7xnOAKh0H/JSUnFXng5wLxRclQNupx5j6I6TPjjUgFUn7z+jT

Q9saufPhKqLnybDyI08dJd+CmNbw1tPcCP8w6K8sWeYKj1G7pf/gLi0JF+jBQKxMTYxSpsS5o7X0gb6Y

Px5GpPe2WnQT/D+sTeTU02JwTAT9KBLNQrEaeKigcM
sMI5YYKIysf5J1BDrBqfm8iCI6eUVohIJEYUa6GHGkDvqyTekJ/rY0PujWSWZHjDLPpMU7rP05cgqy7l

AuJtJSxbWoaFTlDmbm57WZLfM9PRTFmyNpworC4+mdTXEXrfx8UxURkrWdQqOwhcsTnjglJOyU2mSQb1

ExRci7djPOleHJu1dua7mxdK/pbfa3HY/IOTGHYO4A
PO+Ge0X3BGu3QszY+GQeFJOGhjHTKBuxmSI/jJZhFNaEeTKRfC/jid8+CZKSjHk3U5zJ4bESOYMfeQyZ

6pt6R1uudbMKOR/N7xmZOGcwdZLLqQMTpgBDqEnhCZZBkmi16vubYv+4kUM79eKHzEykJ3otjErmMRAU

2IIWD5i/R9QtcVctmB+5adF+tO31GDJqFUVmhvlfYH
A3ipFyn0xXLZ8jM5Rluc9ygEC1BlQkuRvUs0xcVMqBWZkcVnX20YqKfHKjcxnBXBATjXKrh3G4391jto

qjchoIGPkcGC9AFPivbg56F9JXInIY/6PscpmhNnB8aoHO6Wfi8JVEWoJFYq7lAra84B3LjTA5IDf6Ga

lk5hUcdTHAeRtlJ2XGs1QYXc7jXi74ITwIaBY2og8q
pAcoKbxLyQg9TN6jecTfkHIqS1Y9Vb/mWTjp0uQfTf8jVb5Tu70yHweC/off7qsgEDwW03404kdXzN8P

iPbsSB/V/Qf2kgjMF3gHj7xtz4+3uaz096GZ4w5v3+VXHwNSO864GnwcQHCJyOcNQ6eEQf5QaUv3hZ9H

keaOqAWFX0C4Nv1XT7ayfpA+yu60zl6xxjO+OuxzLZ
jSMx3dq7n8NCW3Nfc+nudJcAP2+CG+5YX/NQTn6IB3ARWcq4R3f3Xs6wxbzr/1uEakySJ/2WpwMxkGcc

WQ9cLkzPvhj54B+QqbKm6JTSU4XtXt7PS1Z+kvRtvfnmJuW/3+8v5mHdJacAo6wCp40w7n+BUM/+1Ld9

45O/DERjP1lxC/uBBtHFfTd2hP9D2DVfqapV6d2J/D
ntpdUXPJKC5+XKURGKbbRaOCAzSFHTdlgDM3b2Uwlc+RG01TgeAti7nvJni/zTr7KLXK3qyQ0eacqcqj

SPpBixh1RptDC6YC0DBIaOA+3qitI8iMzBE6ygemm+so2ejr2peCTta88eFZaYDhsSIVJuok7W5T8YEZ

1EhpK8TrmwjZ2+gPSl1cG0YZOZJFNXYM3xPcw+Ysh1
KnpXi3Y+Lr8hyrJ92Vz5zAwJAtZSk32+lzdCteKG3q/zw1CBUM5q+i8+xO6TWXO3l+jI4UCIglG0vRj/

gP4n2hE2YZt3jTMW5tguBtHhKtTOcY8S+7yMSkQYNL/bcOgPA14AltAWdJettzCwalIK1BbuAq6wS0jY

nTDoXYiHhsH1kIv+FP5AzhZVvYphIhPaBxh/8xqJPo
q71Wl+Vl0YTmHPEQxnStbRqTTa3M828H5ntx7sF6c/kztothSnKdreUDLoMxhPiNDoeZUqMMPWb8TlXv

4+q+so1oiDIOrvlffXR5Ba/ZduvmRumtwncr6fzhwPUrcCZLLblGX6A/tFX3jJsOqYzOjAk89GDRZeSN

Tl27lJb9efIXI7DQMSZ1UQa0q2QWng/Pk3VMJmEYb/
K9S72ogmuCxeV6IR5Y9JkoGHb1KcNaE1qbKlldbt3gG+7e7vt7KxzD6fRRIZS87Bt2XoGvqFb52NnSvn

OXIFd6lCRaoyjOAD8FSjGM7aJX2jCIH+obRibokGyho/JMtfOqSDe4Dn+TdY163QeXNmbizLGP1zkZuC

n866zxB7GhXXJo+zn50DINe1x2v7P4YH3emoRVePQZ
DQAIa0GZyx7ZPpq8d9/cqYfrBUny57JW1aKhbE4pZy1+5dab74qIPWgjIaNFbfjlDD38ONSfurPFQyiu

mqzn7ki4i8k7B1MZl9B0zWXyFWieXNZbY842/6bheoU3QuCaWgR/GMhVhTAMJELVf+pR7JK1q7ZAQIrx

JRaOWQxlWOeD+bvpVKg9UxxQn6I0kpC7tqe8ezxeRQ
gGN4QhmiKENh1X//z+vuW28jic4bFSuFBi72AvklOdVZXky2NaRaLTM2FR1a+7W+bd+lEE6E3y+/j6c/

DqGzspU1xcGqRS6VXN83kd7xGZosgoXVWGUBUtFh7Z7X+c/7i/9waG2ZPHEwyjkBlCuUOd+KTSo+W9Xc

Wzmm4m0Z5lMVdOmhicNdmfXgiH4hF9/2EnQuL4apjy
I9L+arl1nL5DEH76O8KTMonimx8kx8O5wq/YpSg1qHGfnP0E2Pa5kTPEWyLwYCjAX+oPHzy08eT0bg22

rhRkN1bp5qvvRL52A+o+VSvH2eoklNXxqovSz0SuDjdKiq+1o0LA9KAUiMQ9VZfcV7Fv0AP3WXMputPg

id0W7trW2tp5Md1jTKrMiOkjHzcsgclDRvUx6HceN/
HKN2X5zzCHmEc665MMOL5YUD5bXZnjwO+InZsHGVRxGF7M7ifuyed1YjlBbhFjrKk8NIcME+6e26o2Sm

OybKyiFWbkPzMxDO7SzsSQ6mcXq3wiJCMUCl1jvRe6PwAP5gNPKzhXk2gapzxYzRbYbZB06Z1AjF1OlH

ywhYLC2bXrnaK+wHDKCQemb7fgNLsYDrgbz4wHvtpa
UPu9dwy0uYbeNwOPAaF4UBToyAudO+kzoS9qF4s4cwPlss/w+sI8UeIQ1xDI2Iegi0DoP7qKh5qrx88l

ERU3DeDLLo/bvzBu472dsKQF17Zti0ZhYS15sjgzxvuS87PA2RJdLqvdcvfKVu7CBsTzeLAGN9ylae3H

7fq5v5Z9cHE8mVnx9H2f2Ga6gcF2Wg6dTJYM6DjH2C
dG2i/40DExOeqTguLeq+jK4JwAd4jBB2+S6+LgiEfIKAO/61yb592LlrXW7EtBlIxV3prinMjgOI6i5i

oPztCeGA3XDnEMlV/Sr5mtEVwVcrHLE0usNMR5mG2E32yM+BZvrcCgpbeOdKv4rFYP5m/Pb2MMwLLhW+

h8RE5qbkTOY+G7RZdAKaYAwbqEfgGce//xlJZMoYAI
iL76tJjrS0rLAKHH/hwzdlI0sZZjzLm6l4qcDp+UCDrLY1CfmyfVFRUWVHdHH5y2BBMHuS9Gn4Sg0ua/

sTtkTfXpzL3d8ga51wHP4awN2aCF5REdBIj9GGQDpAp6gpYEMY6ZVOorMVHaNn5b4SU7rurPA1N81Wxo

aG08fTS8AJ0ydf0kpSIhb1VamADfWI0P0cVlccUqkb
W2LGZJo+d3XxuA6FD54+hXqrPYogfukLDFvtOE7BAM4LlvQRT3RaxmbTXQ6bK6Dz1mIJ+kQBDyicX9Gn

z19Qj+D1wjKRRN3dnn+iAXvHbqfKExaAFYMKI1WLsSTG+o4Pdsr6h3u4gt9vg4S17NgrHZNn+SO1uQUr

+5tN1JyczZomURvhGWMNzoGArpVl0UOMH+iLNlo6m2
eBOa6NygVdxfjJIJhZefVWZoLz36NjH+R1WWWUdFtsZ8MjHZ1yPPO/STFtjX/h38cLRivrnBn73SsAuh

Dhbkw+hjjr2ttfB2TNxiU7FC2TGptYYZ/XpBQRCaHZsowNQB4XHefZ1z3hNB4+1YQ3AeJVVEsDa8wJmO

XnCrUD55yxXlWbiychuLGohNDG4YD4DWeh2cGxioQs
ihtBcRyoQUfHvlci6XzOyAVOsuQR2oK8WQ0uh9OOHT5amWFxQSSyn3CH4ubwwC05fdkIJn8RtjdLlw8f

4wiMIIjvu27vmr4N1apcNsv8gVTL/geSBTMwXov0LuqqZVbEss2BSRcBr5653FGEFvv9i/AAxLJYIrIR

XnPtT7AdlvwD5M7pNreyEnnZLjD4D67mrOIiJiGAdK
KI2gghJtw8OVKuNrg7Mn7VXcqNY8mrJ2539fg+jjg8jkp+7h3Voa397ZCYla00Qkz2/ZR575c1q/9tlU

i1fqjKVbuwQx3n5Ci3fqZ0AdvAe4B4fuUBMDIFZidl6sajaev1aUgtZL/VneSNbBpDdRLUOrrKmhBFC1

MihnltU5pNpdnxTE1t3ec411dIiRqPWXQYH0irHme1
EL6afc74m6L09SWf0IXwRGsvHo8ZxhLR+k0+XwZ9lLcZ5Vdw7Gn+QjW9kgzyAogxFcwzIHsg2gxOek/d

s2Wj90neip/i/nj7cJzSFDFPcddYPEtqbn7UHRWpD0eICUJ3rRD+fiCpNRqiu1wW5B7hxyv5KQGljrFF

Vmg5ssW6ISffRISu2VJmzvIb+hIjIHWOivmdIekwww
DmTXXJALRArfPpqsb62ZPZRhkbNxQCiYD6y0yQVFr4BZ/PbjK4VJUx8iMiwuJwqFR/Yy+u5iWxilBG8E

6CYqy2alnIjO2VBZC6QOJMzR4D7aH1KeINiKPlr0YK8k5hcy5PUD83AwF5jf61pg9/awoYODTsc6ZKsD

ndrZM5JlQWO2UlAZAeNhg5ySFJ5A2ku01QSb/RT6jN
kTL83vivWvHJCX5vDIMgIl4jHwDFvHePs/0VJZsI8m5vP7e34Ca3fqTx6aQuHmPO+br4kv+5srl4L8WQ

8e7he0IMsm7wDNqNRcA6EJqUQb//haIqfh8xEdkAoj0beOpf6Ry3WjxGbjLSgMV9zoayNBRX8rFaabCM

JtKGkp/rL22LsKyQULx4xVTKrdjDXbmy/385TnP9nz
EhN4tg6zcx3JmpFo8mtKkYEEZVgNRZZLYVwQc8vVYK8Uk/Ep5cZlEdY5zfLOoAztkZ6BWr264rnLGsAL

uvRbXUrqFHGAxrVydytH5hsruG2RU9a+CJMpfQRIzwhilHpOLAdmK8mSqaHH6905I9toDYdidAahCra3

D4xQhug9glNkZ6p4X6lbf5poFXcztclB26unZe4SJK
OBmqW5Aq6imr0cW9yPPsXKByY8ZqSGj/35lH+zz6LpK7HpfIGwrsJUApxjcTsKsRpJl9pGFfo4/cJM0f

uH4semFhSeLONMNCbwrzXBv2H5hWAI8NamJ+DibqxL6f12afgAYwWu9nYmBRs8dECyHTzGiXtN2q2Gzt

Jwsri9o+sUVkMpy2ubcac7+t2YpApQVJelj25gz7Ef
UnhPsnwn+gZ6Qfek+xU1Pnwouz4o3ieqzAOY3zSZKc/PV+vyPqTRAiy8dApfUK45nQtVj3lsBKxOeSoT

CeF5XJ9mT7REA/+w3StGkcEF9iF7PrWDi2FMlztLFEuBSTyuHVoC9aKgc8OymllF2yvh2S6JfEEBD8Cu

MXpB7LDnBrZ8oa9NMb/o8LdlHhm+DVafm+KPWyXxnN
T/p28s/4v/4r/4L/6L/+K/+C/+15P63WX5q9hK22ndmwCJRDAxuSxc8Xl8uO2NuQfH8QX5eLl9Z76iA8

x/6ywThDBWI8y54Ie0/JMjrRgw7UDjTQwT10L/uocBufcWxGGrG2KBtu+zo5402uIbVoYi0Vips3vP8m

MC0Wp9V0wsDLD6Lhk+FwJd6HUtpelSWjd20E9couD+
4Qdz+wdr7znFS5ZxyWSEaG3OovvXyjDTGrKzoMfrQ+40U4x0/9q5wosvG/SXi3i6k8X6nDemYFXEoS5X

z3//XvSIo5QjrhjU0sHq5SZsHJ+C8v8m71f9Bhm4AmtDlABU143sErnFEyPqt+exGoqLGvnXPeRsrMMs

rBDSBZvEiNRBu7xG4xJDc5e/kloh73H3/K3I350hY/
pwdAMwQozqTVJMK3BBEPG89jvVBS0g4mFwLPFglUbx5vlMJ+5ibkmEFm1Qnr1kpRiQqyUMeIGmux7awF

EyGCrl6IvbbzWOUkmE5FNyXvvJ8YJsjf84Rc4bUo+Ve/u2AHC0PrODD0QXjPrMGjvtbNh4Ln6C+wNben

s2JbvVzK+XE71mogPIc4Kcs85MoH0GA5hpg3MdDuA1
AhDJDmpu0SXZMQ4g3sSczgpRGVVsGf/KhkiFtZ2jQ55yEv287xertYAgWkz2ODfpYJga26XyX0Hpwo0w

XMp51iflDcSnsWVTkwDnI1CrqqWuV3052roc99bsx3lgvKOH+9TyEciy+fE9tHHCcxT+EGEQiBKtC9zA

MMQiPEzLeHz9CqpfmRH1OmZiW7uhrSnmPOiq1WSqH6
gwj62+lj6AP8HH7GgpCnKhVkqrKuRTNf/9Be6StMKBupzHNcJcPZ7q1i3K3YzzoBWzdbBLSLYKx59kQM

U6MU1C5TZybokQrmZnnkTlsMTV9IVX6iwIZiIM+B3Xn/lkw95SSiDCbKqTA14579p/Xh0WHmw7WLURuy

aUfyxf+rcjkLd/JVuxUDRBfcpSe9nAAa7Zs/x99wmY
TstPYNRw0q7UvRHbxwgEwf5v2sEK7zhrPdR88IYbh7Pqravirit/OmOfK32drg6S/0xlPZ9hge5nle2o

[place]+FeSwgs+IqWM+HVHS6UlRsoINsLwy2A3N3HRGQXEwKVZB514enMEpyzrrKl/eTg6MzTCtKRlgiqqST

ZKcm26Ogi9qaHsw2/aRBpKxTXz/qzlHnFKCnz0v1WZ
0EA8LFC49jdryzlIsHrx8Z597Wy+HYW1x9grouAe1F6WnWqhKqub7aNPhxAGq+9iRHrV+lABot8wernc

MCW4FIcCvoMDHCz+QkJ0i35BAwi3SYJ0FUr/ivlVMx2KWBsKnSfhmA1yINkQ4WkSLhBrlEnMqvHUFF3k

ik86IsIdd9QNyufTwA9Pg2Ezzw45H8RrVST95gYtiQ
G+nKFbwd4g/7azMNlrs0c51Rr1JMiwL+B7A3ZORHWhvZRhca8yesTMrOlQmpRv5pMJEka0SVOyWPO8Qx

s+j3h8aU0Yny6XX9btA+52xBQABCxkZfhmgHPT+sI1gtNt6mzU0snG8/o7zmy8ff/1p4Ukji3btvrIYX

Wpr4nMcq6RgB8p1uDYo5pUaU1qUkitv3+yE2ZsDIJn
Qm1KHEv6VhVw8+GYWVgtnmfsXtlcyijGBOdW4Q4Rq0LQfmEnKGNDTiO1k1qPHw+iwX1p0NaR3Oq2q3sX

xolbIjmn14uL4VSDGgxTJ1SIz/20OU5FG8spKrVGuNLxPFS05Ir1dlwxSLZt7ld1zl1jFOiiZZN3BzP4

fJvDogoHSo37zyMCmO5cUD/G2vyxH0EgldvLCE+YXd
4umD/c68B9FFxz/VgyJ3RHWvDxzi6rSFHmw59S7NmLrG9GksrFB3y50Jb4vWUvXFyi9E/2N3YZqVYRrR

y2xWeWppEKVsuO5/lQGJ+4Jjpizsv7n1nL7u7OMBOzLNaDZ7bBvbhEEFtPlEhdw4++3V7tXqcBW+Ztge

0JjZlz63Xnj6nM23R2nhTFwtrS+s4XIxaW27cT4BEM
9oXAafOf4dutw3K47LfGBuzFK/04FkdnjPnE5xslUc9Pfz4kAJPKC/WmTi1mryzs/B9loIwuOBnmwQYO

2FpifYL65C2pphrW7ksFeT/ao0lh603kqw5dpn5e105fHRnM2LZp7T40MsMRC8I6PR8bj7XqHzc9JavX

3vt3O6QpMZ44lmv+YQR5Ezcag4FKCU7Bh0caHv6yVa
8BAe4dck9QAD7bjfEzhBEY8TZ7MmRDqQGKkSgfmVXyKtDYAR/9BnpOGmykl1RIPeqQWtJ+aLMwxXstcc

+m++GIOClytV5u8dcdARbqfI8s+i1mvK7hkRh3stC7tAFNyG+Ii4nXQ4ehde8iDNYCntZSo5Yy5Ams5p

HQRrrJ5akk6qW06L5z6V/wVz3InAGkh44rd7qn36+Z
S5MRY7QCc2Jy43Es8EiQABkJbLFMLUpH1ZAwfmAODqcy7bC+pzwtkuXgh7h8zVsX8+zeVJ4lmkuBTNZa

chPeBRYEIo2LdnBO9odIVKQSoACdnFcJXfixaaBofbzWT9wcbbsVqqXCCv7P/LWcKjuT+SSIoXhx5HHb

OivKzB2+17tzkgVZQUXK6dg3S4NuMgoGYl5suMvM1p
K7h1vSOJNpgoMRyJv5DTQcDkUyk84Y21B4EMLF0PgVKrAOzqO/IZT/A6MCNjiuALCe0wVADXnFFDwOrP

EZULIx7xewUJBFRWDA+malrLGHoCigsa0teHQoLPqDdgQiPIJ/XcTYZjOGHCNDS/s7k8Ga46dh4fk1IK

g0TEtigJohgqT+26RhmpU4ItEIamUm+3E5Qjdgcm7+
it2fBIyjtcy29a0ult9G+8B/3vK9c6nDb7L/zggVr1xuR41KzqrhlbgB4MIpZb/AEgNPqJ7ofRxRwlyi

HsA6tLq0Q022IaHUi2qwQwrJNwU8mr/687OSIy1PzXA5MNRQM7HYL/cISS9L5n/H2LjdlZEPpw+TSA6A

33bs3W8ktqJZyTViwK2dRT4TLDvpjkU/xRExOU6oTQ
51keAQ2Lg/gBgdXaLloAe0yc9a2eY1y63amomsDiH6/lW+fYs4KPKA4QyVXksVnoVS6UNuVjm/Jvbz4S

Ry5FfT28gvR6eZqYqTIChzfX1/1GgygyDKWpIAXjTK6AghftnDJhbgCB7ZOEJ5H0x27kiJYShHbfB3Ms

aS5Fc7wQCPjY8a0rtxmrh2YKhZH8+7t01p83owDe/J
x10uAm6/fI5UopNrB9Gq/l+z0bct2AiGUcjsG38Qb+AGhsTWL/HYz63Vl+WsEBE4cZtGpfoDRF4GVquc

mmdyuGAyYLjPM75TsVaDC1eGcbt35vXDbutZ3F0PkkJOpAC43QFat+4+MYpKUQr1w/3O019PZv+dU04f

5peF2ByailnKhX/QR8NMVCHmaUTiRKef/7OBfvVHKq
gfVzPCEc8GuVCLJ+oe8BcXtYq+VDzO5nV4moPBQ+RueagF2iSAboAwt2mdRjvjhwD1Yy28IF2+CljHcl

Ooc/1xFnNr3Xt/+mnMxN/pmsRktRAeD2aTy8mvwE6uf2oD2iXAeu6sc6TpbeNZJzFdWgJt2i3j/S77vX

hA2p75lYRbhJV1MEZtsCrUueA9J1iNkTl2zBpj4Yoo
ElMjzj05003VFWnSCHnDVr/1LOv/YKb95Ub2fES/dY9K6OmGO9t+Wfazo9T/CDvljQ/iMEG1Mw8j2Pfv

d195YZtm+SIxq4WgBBrmVNlQYJ0M3iO5hz/Q8yppTjJcl4KSrGQDzdGcPDBan+uEIz5Rm/5DQDLv6cGV

jr/FdfPIuZ4SW9X9hFk47Q/S1bfXg7QL7HBOWRg8Cs
RwO1yUoXbNLfVl/I2Q0VEk/zDZxjvrg//qvGLgTxNNxeGF7tj37kGeKhkepCSzUGB9jc+wpTc9aDlOPG

Pbn7gx1WnAaFxqj9PLn01+1PWTFDbDM07lC1f+eMUdREMoc1117IlBhbZqb0ef6ZUuseoMUDqsVGwNns

hLP4znIBYJJU58PHQTznVD2b5b6VScRArhCTJWs3n3
V1Z5nyw1qdK1Xr7zHQMZUhOl77Hwo47mApKyRrZrzMVZFM9Vhjy8xfsGV3Q6gN9/qsv9v/m84bv7m94o

llVy3EYZCh01DDBNHhSntBfHvXcAe7QMpvoFKcgdpvA1Ycm+TPGG8ne5ikSgvgqwDLPiDYVKhRuWhatI

7Ku1dg+jD7fP9/HN4o78iArTPp+scecw82N8uZPEiX
7RZaZW/l/dAZ6CCgh+WmhLO0evvoGW2+lGK+cpLQwLhSjpVfuY3fZtExttFaJHk6/segflTGwdjxGyho

920bi5LcsCefrbP1JzqA63X3OuOBhVaaRnFHjQ5/pP/50XoglmGqJujlz1ozh8T9HkPuL6oiM6ehUi3g

oIKuSocMCRLe7gJxxNMkRl6Q9dxZXASiut1gRBhpeL
NVjSW1icNVzrXlNxFDLx6+/cvRqb6FA8lAk2+LPRa41OXmaI3fd6yskeHMZmrXLDLeCudXg1HOT09KqH

4lHaOmYlpXC1ET0wQOlQlcPuAi3n2aa7rhDJwp81R+msUVygrxFRRVgURLCKVuFPh1r7OAn8sFLZxGG3

0H1Pg4ss2XxCpdIBqbl+8Ipns/NSszATJtyqKmvRlJ
mGpolGtzTa3z+DkAibEi3o3BT4OYGT+2bFlxLE6gUlKIb09Gsqo0CJVDcukkG6/5oAqFuxa5b6clk9dU

Q/xSQgONE10c02pAGhFBWX2p6VVu6wO6UD3ALerSE7aEkgQNyU/zFKL3EO60X684Rqx4NcetRUcQYHMG

vTHoYeJY3v+bXvN5yg3TAxbAh8nsLwGRBC8Ut7lZK1
r5z8W3HwiD29E3nSakdvaFirs/Lt9UVh/uHsXtyzO3PmornnAxRa6RXgU+quzap6qJhVHbQiC3GzFfXt

SzgdSlLQ04NuqToksb2ShkRcKEFEUnokTbO16Zbz+IyT0tn7zncXgyiUIWzQVmo6hrEt1B+G8sztyO9k

4/vKsPgH+Vab7Y7PB3EMzgO9pJsMfna9GkvKoU0EgQ
SaBcpndN6bkfI1s/hwmZBE75f04VWqe+bn/hr3iJ1H3/xVScXho0ZtGF1YSGQTGJnGg4K9mFi3K0fEJT

MqLk749u3vBJxhB4Dhu8gnn+SOiJSEcoECJstnRan/PXwKng9+9vbOLUAu0KLRgOP473zyzxhqSISrky

xicrnlC11J9z4NJnnAJlq94D+/A4iz5Dk8FNQxOSWW
2FdIEmtmZnrC94kr9Ijz5ClLbtvJIokpCEun4WOxYFNmT99S+VgVM0sFh6MTNT0s5pWxi9NmJSr6Np1y

S9WwDdzW2jbmORtzcCfPpBrmmhFoaiS+XFt3WbR7cvkvXhg1Qkc/rYBwBZWJzViyWIwGHjaLnCcMXZVf

F5ekO5djVvgvUzumNfLovH4OhDlrkel09+j8evsWWx
nUAgjvKqVBtB9NdqQj5f4BZ55W/GQVUlha1YkpJeLgTt/mKu6a/FbhzB8BEysHHgr1P4nc1cRcznQdK8

7MvrRNi/GUNk/HuKqNnmQvwmnf4GJfYdPazB4olI33waF7SybFh/frxYsBeRjldIhmYZCeQxn/3c333R

b+5dbPUrAGY04meQe7JAtueyOJJ5UfI0Y9cuitH7UL
YpGBwVvAIJWxJ5662qVRf2IOoZwWfQkzRs9z7aUjilisZDPGzxkwyyneJ4nCFi5XVVTX+cFC5lD4X9Lh

umBDT7LxeGGLHoPoovcvdSAN1+Rjq3q7GDVyGKuMlwWjxq41SegqH2R5o175LjwgZP7FY1ayYpFUZ64R

vEqiFN+sgbtb/MSUjoWJ2frC/dhSIND3Z3T1oLGpA2
8501hh0yRLY5DOj3j7I7BuWJLi6X3pkaTrZL0Bbboz29CaexHM+Kk/itp8r3804Y3ZXGgqxiQdVjPSrY

HrQ8Oq8sAMBW+pfyBWHFn0e3MMhpIgD1EtsxYCJ3tI0ldJH8PTBppxVrJgkFm4Xv/S7WyPFXAZOMUif4

bjToWIM2nIUn2icgcBHQ6UwAxPR57nD1uUmByQF3Go
PGBXnO5KfMTgEEpn7VgcxZHIDhQSh/QKvdC78ezknOPIS3sEzcDiZRjCPhg8ExtJNV+ZfXrxNLJgwRz7

gvCMM+GPe7BWQCLOPyvRwtMXxi/ZO9nGxDUM/N1Fgk/vPEnvuj5/l/Hqut/3W/aXbiI+hfyb+DuR13e/

DiTZFeiPBdqXqvJYGIfo5tNDvqdBYz+EDH2eMa+G7y
vc05ybcZ8cZsQ3PUTyCSGy2RVZ8ix6UprYIOovuWr8HNcR6aGVD/hGEh6mQurUy2rumhXBrSLrGCwfeo

6etOWfuCysdQEjY8sDScM14u/JXLn7+OgCI0Y1LY2EtPRy9RGpURFUJM4+7HXZaKXAysyVknV75zc7NU

qK531IRAkMKroWIzN3+g53u3VoX3D9vYYF76vtapKK
5uO/ijF/zlesH6wEHUacWihIP4IlTkK4z2yIuWtuoZZNMdgPiAjk7S6Ryl1zLrERvGIsrS40V615C5Nd

BWLsJh8NV0iv4tU95tM/HRGk61I7m6M/b48qHonMbB+BmtA9YMrDegoRxXCdHB+6BHGfiPkvhFGuL5W3

8SLkkARmRH+10bWkq1WoPklwLibfe/3YEvq4SPGvbx
/jc5lfS1H4YvQ/TNaS+M0uZW1JRebI0+HfTOWutePRF58QaQE+76B9Kw9PUT7WW+oYvLuuSogVuE7pLd

hgzIzVPKQXOe2W/NP4WY0cEH78ftcTYICwJnJKuxg8wPuCsJ1T5C+CWul8nF/670Mc7+jKa4s2LJ07L2

C+mwUJ0zD+pq20GaSSXkNRf55oJJbhdjSkUfDERLB1
zkjmeVqW1v36+/VQrBvd9NgNuswAIXR4lHFyitdms6ZsP4l+UXAZNUA2hzz2i+TJICeagorWWm1zH7pJ

/nXN88vC9M3VHrXZELjKMMDDtruwm4Rk+9scEiA7FcqAVZI4nkvq6KY514VXWbAjxBVB2Z4/ww45qiQn

RmrEujbOrFtU2Xn53KeHrn5shrkrJ7GriLwUNFhIIy
wv33pzNj8GvOuTfG8+EyYIlumz50qoCNadIGB2CAw3foI5SNj/UF/beuv2gW078LOCDcLm+EzR4BZ092

i1dmrtoiGN7I9vcBgIH3TUi1VKDl0QNdRtDFBy5Nu3ve8cANOULoaq6rAfN4Fg9VDZxZDDfUAzjd2BnA

nwm5LSF5QaCdMgnrrBBGrCZgxLJAij39uzd6RvEveh
Ydh3qtpTV6kG4UNBwOJ9Ub66wyIk7VJ5uUeLuBSLS3ueaor/ndlyZ9bD5oAzfjRdUps+lybAakXaN3Hx

rGv/v7SmK2+IdxHyQrdEH8IOS3PB0stHbOu7XKv04+t/nPlMe+VPz0pFeKLqD8leW1VNrjW2RT5qdWFj

L8jHJUUZPxPexspaVZOLQkqahxvEOtF/TI5uCf/Ys8
Ox1WqaGsl35AXmIF2NBl6srbRAkM+pOaIE5HV37LNTsQU+u+EUpsuqPGp9jc0g5bTof54G9iwVgAC0yz

AoliZFnPqz72+1Wdfy1obyNkWqCxj+NkFSGy9JQCXu5+oo3+SGqj89y67/uzS7za7Tv4aRjsHOmLkMiT

n7LX17h6H3YYDL8Ra5B3panusz260jNn9VAi5JvDEF
FQGO6Kfg+zGOzrWtLfUsn+jb5VqB+DabmTLDq8tMyEnGoM4lF193c2c5ifrdzTGB7NVX77/9Cq+Idf5d

D5CjtRnQqI/M/j3hJnT1NORtLDiuTJeWAlqorc+TKIk7NNebSNpLpUAgDvJaL9DmeYvy9r+D5Sd5g2kY

t1nR/PmUb7zk1DfqAHrvgwLvSHYBRGYaNGPLYvPg/7
Fj00qnorG8nXHYLoLA2zhPateHZ991BlOGq05xkLBMidEpTX8H7WEyuhBCYZvNGjNKYAjShzek6bzVgg

aXFzJP7R+lchSSYASIDJ6C6JYAMGrlUis/FNug2iCatEp5RLulwyxOH/jGQEctkm8SaDQEn3lY6HT9KI

ZU+RRvVQirRmL0P1sUrGf5tlErfuSxsSp61aU2UeFI
cKfDVszfACc1nUuKB89wj4o0rcjhFcSLdrLfvUyaPfS59WiZ/1lxx2VqpRARYkA7coA9QTGDwytKjaTh

HZbayVIETpC8CvBvCTfx7I2AF6mf1ypnrj3EAmGi8C/uPFE/Fq8LIQkC0xUWuqW1QMEvWekvwf/Nbz6T

SbSfFf7KZVJIcf28RxGRFKZtW3Ts6eWzfIRVO0PIp9
AlF7mZVVrdfgT5ZkFCG15d8nCyfhoyIu6qgFZDxTXVUoLmjBV8xW2x3OK2B6t2DmRIhviGUC52JxWfKH

VWQ5k+YpTuH21Ipgi1f4RMMGg5jdsnakuAjN4XBPuoxeNEmOa6Jy8paGaOPUTQO7N1C0dB0P2/w14qDe

wJoqnoVYfA+hob+VG2egdoDh4hkca42q73YHKQjK0F
2EKgftjiGI1PeeqY1Pr5VpbE7h9O7kW4OjA6XfJQ5KIt0k8aEz51KPosHu6kS4wJf+khdE6L1oX5AuCE

b1/oczV01iIYkOP90kBAxTmgRoTfvJVujVAjNvj1RJScx2P4DDOOKXfBJo8EYc8gqmc65ZKnSsO386fv

pnGdSWDWl1RkxDl/pm/YwadAqZYc98PuyoX4PN+KY4
HSR6wYGbqnqCIV91tTnrpfO6BUhHwFBOFWh9yosDrwDPKe8EvZCfruMbK4Qqifl0ZoHOMY/JkiuPzbLk

5UKmBF8JBReoGfUZ3LdRGfa6jX2esbDaenp4298+f1QO/Rf/xf+HkH9Y+V/Ak2waZPjnxqCfcOQtSP1Q

XB3me1RfKPvtTOWkOmmCYMixBKneCPDiRIYbLD3LXU
DyWVOxwATpJRRjHUElNmI8XEKxF4Ezg995vlQtunQ9AN6HO9DnCWX6YKz2L6hbNaNwFSGjNBYZTy4+Pi

7PMFWyhrOrEkWiXDSgZ14kyTOqqIQcSpUkIMJjQq8+Xk8jwdZeJigTEPPzWOGoRuvEDMepLVDgL1OvVW

IeYu8hzGLjOT9COmBMYlPjFCTkMVH6XQArVIVzJNAd
Ze2DtEoiEWGuFvAAKpCxQXV8goZruI1qtoXjItqGXAKzKWGlDhePFUboMJIfV2KqBUCkRZU8Fc0JEG2p

HH4VsVphA9B7CfT2sXFkP4wmOAmiQ8P9iCNiN0iAVyrxH5OzLrbhqMXvSGakVsbgqROJKWNuMYMpL0LF

RXJvJ44kZH3tG72px6WWlIUuRXFkNFT2kKTqSajVQ8
uvSDDbYQS9YpGtTXEuF3b8WWQ9PIPvVhd5b7EwqcDetAFbubDgoOQ8Ll0cnVRyKO6KOfus8CinHGti0g

7XBDBN5TNOIXJyUEuWyYLR3gHNRMfGv5urZeSgDMk0n9wsUpK7rLBesbz06eZYk//uowLkmnQ7nuhvn8

TMvc+uWl/OPnXqrN/ee/3WqjrnfuF+KVBEiOk9IPqD
Qj5zJy3+Ef2SCXOsUQWhHB8XCIoCYh5oCtSoZ8EG9ettO46zuIGeoLI+pzZMA4LBtDpjLYBLZ+I1vwDf

Pw/BhKjESqOdckGnonM0tsCL3//7sN6OzRPmhrChT6S9Z9BVNcGPOdg2DHgTQAumkW5EmR9KSJoezGaI

lHf0UMYTH8EkFORrqHErwpZvfMFvL4b2ZqWI3CQ4YP
94ZR+Wi8lPed2EdSlYiOHI1ruwj2TvPjUPw+EfhM5XGH4HM7g7ji2rJc6RNDKlPRZrtXmpLhaArq+1tH

Z97vZTNV8lZH3djByqHOei5N83tqbTknZFlTvVKaLb/WxTCa37Fb74D8+fsGs1rQJ5pl2D2DjU8ZEE53

c/MDg0/JatkfP6ke0oMuxN46j0f9Y1z0oSCYe5N0+4
RF1d/3UPM0JY5P+M/jel0vzrsErBMEz7//QChHT29hNOwVnujJ/IZDVwvziRP+oXtjL1y5MS345Ysw+J

fRJogcRkNjCLT0M+Q+6ddS2xWTl+b7AzolT7H99WHVRVxyAsAOHYpJEH5g9qXbt+jX/j3/g3/o1/49/4

/fq5D3UU3ZmdLpCHcxe1MUQYCYaol7uvyPpLendMAh
MeXGyXaWYgfd4O1sCA7dsGXkU93/Wl9TGHxhB9C2w5sApJ0GlLR5y/pa2KvJ2vb9RlM0pIGYj4Rp3jWe

7YHSVbxuoMeHwuOOO44OdYA6kYx+BUmA8C4Mm8nmysrfpgM8re5ASjQPe12lMUt76UfMDYCR0U2bhFec

zBy93InRsHcngsHV56ITzeyraW9oAZf11r614L9+Hr
ngADz2RlapWmvlqa+oRGuMSkqpKNFYlzxE4Dvnu1Nnmt92+j3tnlPvNvgC0qHztYs00e8diGyn0PfuFs

ukq9AWkVlyru0rulh22x1ouM/4vAWwnsNe26h7qkBKLF14W2gsij3etLpatXI3qaP9zmhQo3Ztnse35z

IFSoi6Qia6DC27hv4wzyIC4Qn2+hIdIWBjVXGQ4Daj
hUomGMU/syrgBj1cOtMErTf6nIuATDrwAoLMssZl/fV6wzqfNg6HQC+ZlaAjQ5FC2tZY/EjNio8TnS6+

sYt/dbFGyUoPGZtA0mTNmjx3HBIenpr/LfU8Yi/M3nr+T77tmtJFULQRUFGlVx/ZW25QLMZGTE5lFdPT

E3Yrt36nPXRDg74KUx7r8wpS73mz3tHvQxNXZvxQz5
e7mDsWPsWU2G5Fc7fCPfvPzVcD35kfGefx1XqzVj72oTmtETPvvYMr9anPzRQ84Q3g/1cnqez/vM3czx

vMNsTFLeeS8BmMvXWocBIgCexE7f/xY7aeFSw5pGxqtVJLB/gwRFAaT3uz5Cjj8+afZ8wgDAZMN7+6lu

kpZuoqqVU1a1F0auHt07q5Ti9G6rcAsAPW49RJBRw1
fuuDQSE7vHGLw95Gb1uUn/kv7R7IGwfkvZrqPaXpW2cnI3HiMYOTGNw3zfAHCrxYjz3XOg0fDi1y3MrF

/vBeF4DxUUGf2yzWLZVdb4uhS15sgSRxgr2muCN/Z4M5xsg1Fjmse94e1mNOdmCPyA+wvY+afIcCM89x

71Mvxjo5R/mTDd3eO+Y5R9wfhG1BK1AOfjHNtuT/ES
92GbCL5zcF+kmhzijqIpuMwqgJhX6JfurffBT5sNIEDQhcPranM4MP6+lNijvlnWih5b7VATznvDQEg2

3HNXuB4qdCFQ0TWBgSmgKabvuNuSN4gA1ZEI/Jgdz5odJ6iwtrYXWnd557uwyOgiPR8A2vNA5stLJVfX

xSumgoMJrwsk2a2M+ob1Wiu1QJDZdRvj1tBzNO1Cf6
lkgPgykPD7LMSjm2d4ihwgWvYf7IbPXppyjaQKc1SdRfR/khtd00pdVJCEybYtgCYH5gPhgcgSjm+yYq

X1/TZReHFDSXaG2u8pkO/05Qg/M7KtinUlfeGh/F4Qs21h1m4QfYUcqo0fdH4X5M7M0qKqTRKCBx7elI

0S75PmJx1xEyyeVMoudKcNQ/u15m/ILdmnbuh+gH5u
4Hsenqob5gZHkpKeN+LYGlXHiEHJN/gmAhjOk40S07qTwgGbmp5tO+XkDdFGF5Aimczq4EXprgnNz7SQ

PvMDCoW2zGqWNpQTpsI6I5hZCNoJgEe7jghjM7HN4ZE9Aj1vHubdJTk5h4/pFT1yE5rADshz8SHDbNRH

spnF0M6+BWKGJhRJnrr5W9A5ZaUQ+T6KFfRJu9OWdO
Wa3CfMFLKJb1zqL8ZbZW2rE3ixd9CvYf+mDEJntjeAILBJWdsdfycJFGizAD851FSXScRt4fFNRTQyGM

VSk+qsXV9NiJ38j5yMKvju27ON79LXuFY+r28zEs/2SPMrDRZUuwecTCuln7aPCEDnW1o5Tcw5Kgkyqm

wFcEm5noleIqyPmlhnC1Cqr8rKGsv7SrK2YlP+vj5V
EfcfQ73Odr0a9C5eDyoVFa5n4TF2zhdmSWhvNSrUZzCun2K2yve2jkP683LToYsHfkyTov80b7eAZSQh

/NU554rSqzEtikcPyry8OYPYtugslOY9ItAfPuSD+ULH9EjtmVQ3LXs4XbKI6ZQr7WOuhdbT+Y61o/Si

5Bi8mlUqqQd2IsYaoK61WYDlbnU00AI3WZrL6JaMPD
Ro640IBE7CYDtjL6sCzGz0V+jA4TuawUYzwiPXYBq75liTJTyP+6g4/ZDHQIiBRLmWII3cZv7pz4m6Tr

JiL8Uyz/bQ6ajuFPh7USaIYudHtNgDV1oQRg5/3zknDTxpXvRooJsdZLOpc9BQL/NptgadpS1C2ErYIM

X6/Qn4pA2AQDNfKxx4Bnj6Hy6/rSGsJD2L4/8k8/HS
1M+hL6pE6ENWYuRLYN+oF7G/D64eT3Cd8ycY4pLsoXd2LpK8EdzDi8oX4Z7JJ2X+Nl36oUJnfTGseRuJ

XRZv3uRtSDkEDXJLLUo81nWMn+mcIOcE0tQ68H+k71Bvf44npw8r+jEPhipfb7lE8ErAo/KlyJQ3MzO8

mJb0bICPJr0sqicgQ3X56/XUlLTEccYdB6UpK/Ka7b
Nxr3nqIsbPJ8R7R0Orw1K+oT6aVv4/+L9olvUXZ4HKPbqMPlKvN6KUKvHDF5ghOu/07gEdyqMmXmQiUE

L1sW6KI72B06DAAASalINiBM2e9zHa5zILbQq1/Ysfb6qXeyiU+UYp5E3JtByhNqHpt9iW0mIQFwLCj/

3sn+KnieY4/MuAOelAfML2mJNsMFCvS4PdNtFZS4VP
K4GGd2ZCLq+LjJlCq26M0+9O0gIE1EHdCCb+Bzxlif4f8YpU/WFpR2UFneGZfsOsZENsMXv/nYnNUfGa

JwHP0uZUI6NcN3rAaBiGoFYMB3OHPk6UAdHge4mOa4hgqsxfwsE+HsA/+lIe+8UIpkkdVT9lly5w0u4D

uvEnWGb/KgpQI2+S9u6N3bZ8JYfkTzSdzXyLMC/yAh
SqDMt1PihwTA3jwe8OjNKaHVTkJg+WqnDa9e1NvUgp9JPSNzXynXhgI+tcCz0Dcs7FlssALNCtrOkTpF

uxei7MDBPgT+FVYW0NYuMsgQxKgB7JC3p/veJTUFyb1frkAIp8NZ8dcV1R2W//knSQ/NB3WD5ny8VNyx

6gc1olNHP3Z/sh8XIuqRNITdb8/Pce4U/lrkRarBK0
IsVDhysKKLNtMcyzowutyHYJFSbn+fLjd8GCiusVidaic9BfGy2AkddAJQKRvTO8s3DOWvPzJR/neur8

zXT98bs2KJjAtgO7DXtHxzp4MXFjSu5LpbC5R/oCy29GMOtR57hnAc8tRgUYM0YpwTa/vNgvEKqAYSXz

Usxc0LSiDbqnssXqdKnidijDSDvwDKruPZiwmwhscO
WJsevUzNcRGzlgxpBOhl0EbPQIlG15lf10IbtVd3LqSwiFZFn1/mxFNWEdoCIaDY5kc13ho2ysMNVz5c

DHBv6e0EcoXybli1/fpX2f0bxDNX+gpqEaIP3DoHl6jEU32Tr5FBX2dRARB17nc2JRz/JQqb5L07uKm5

A9ifagP9z6gf4yFmgw7AUesl0D2kU0EZephd81Ksji
ilGp+Z44TxlbmgutR98Buy/g6d2SjbNB8nxzZlM1f1ewoV5rL9k6cPBozG2LJ/YSm08nnapx4t1ZftfA

sjBwGn+jDkeEOTMPdnNFUelGabNuWye622pbs6TM7n7Q5KBR5xbai8ucEn38p2hep/48Ex+xBRB5DYj2

GKT/Q4l/LmZTVJTrQJhcbmj9Ke4HBwT7aRUJkZov7Z
U5Bmx4cg6Zje6yPh4AyzH+NO3KfjDCdT+zBg/5DtkNLrBoKhchLjE5KJ8LdiM4yNuxIpaqN3XoiM0KfY

tm+a7PyXa8Llr80wV9/w6KjqffD1x7DWTSh7YIf76rA0tNbvuFQYrVHGh60M7OXmgIqbrZytF/ypLJBK

e0+yFL6wdj5ao6gwA4NmQGnPSza1YxqpiHfJOaqrq9
bq6ePsTN9WVCEMx+MzqjmWfiJM3nitJupEDfFxTfZ0zdSncoOrrzvxPvaROl3+0dHkx03IEPUCVavqe5

S+fHxjwRWToItqIICQxfOiPRrTgfL/bjsh9f0FPnlwShH3MvoiT7zgf6qjCs2snZ0ROBJC5J5Ma0+yzL

0fXLOZMjttX7LyQF9+cigDoDbijX0iD547pnPRpWuZ
XSl20MLzHOlWudFhTNM07RG/Db6NyS8y9EYoY/2qlzbj7cs80HXH4BwJL+2vXhej3K+2lS1qjnWvPzrA

sUUxOsZDCB4e8zWrIruh64pJff7JGwsucTaPaxTmkZZjxVjigkUJm02de1HQS9wtcJnOkr64s8tmYwKk

BN+034cWfrALS3ZBhPqZmAkAguxycCnkrWQPMrYfZL
bBotj+c4VCJKpdDLDzYMUeoKPamsxwh/9vo5Z0uFo9+JvTr1DD5Hp9PHbu27MOasvJRMhzq5nV/Eqxky

8mWkT2qpL7HWAzX7bP5erONKuSNIR6WH+PHDKx8ADYu/Gvj1ZG88LAeuyy52OiPL35JbazHjg+VuTKF3

W1BBZBSJrYfLKsdji7IsBMF0zSJPXAx3Qk/mckenzie/5t0g
BukUay0fMv1sHbrkFlF2Tyk2oV+XyckRsNDSleRl+qjbaMUSJvRJ5TCWEc7Yhb2XdnsJ2a+uljz4drwp

hsHa5HVIZKA+LOWO/oCUCwktzEUazfUUEInaRtXOGGQXkb7opYjX+tK/Fzbtzea/4tsiB6pCRtKIY6HV

4Mn/N55RSLP4EtfdxjVVFDNiinU+rd40y0AGHPIbyx
KIWAcqN26Owag0wU/N2ZZ8g4odfXGRD7z7gUietVuIQoh9b7dtwajPU8OeF1sXUPWAEss1l8lm2NhEKu

42/9HEqrAqCL7Z4unjXbFDNXFPh6vsi2vCuG2LpL5cBBBd/yQXg5TTrLk36ajXU4UvYmNOafcAvjGqbz

8b8j03mCn2A51E5JSbjACf6bfw/iSNQDCRqrbHVJUd
qlGCAqrSlmmV3lJLIE0bNdt589q3XjRiNDbPVHu7rWRVvfQUJjWTH5I5Cn99/cHYaCKDWGtp6X32rTi2

4kfFzgkfboAR/U2zKIOweEjFZYr6/fX9rCIV63Fiz7HqThrBZ/xPP5al+KshsbJLBlRvNlduzjKewPeF

phgHHUMuz4UzB4CSQrtFo584gpAnMcFxuxyhYmJ/l0
6KsvqxBKdPO3k9+HaCRJANON9UfC+OqLccijdkY50pwN8oRY1IrSwebU2sjZqRpvPls+CSmIWh/J1Vg7

1nTWR9gavJXYjkCzibxizvyR8PK23g72NOObF6jSbzhrMl3v4a3C66hleh6IeRORx1VMblY4wGTGRqYu

RQlRqWin0BZUKjnQxPBEk+MZS2/sDGRv1Nq7gTgGrb
lWZql316QeHkWrprMLf5YxBDmzwF0eTokjZi8CHs/XoiSyhq0b+YZp7s6po1krF57N7cHId5Kvei52TK

mpsbsZ3CpPRZXMZjrPKobyHsOvUqlOldajaBEexmLQ9KL4v171rmYTVevLhLw4CGLQui16nXzvsgxt+D

38qkdMG4T6fwPCADSL4GIJ4C5dO2RkVBfA9DqL/TKk
lCI0Zfqw3MIROg5k3R2XmDn0oFk85TI5tJhxa+gWwdgTVW1PhDuX2LEhxox8uZSlnhxIG7eGk+U+48H1

KS93YPwpyGhxYLIjouKsxVm2tF5w4qH49VtAm3TSdxnldv3s36CqGrLycp4Sn2UnzmEmBaqVa2oKn8qQ

0uSfol0QmbBa1Sos5bo881VJl9OIPmqpQ9qI3qYS0g
erts+6LslEfraxVanBeUhWDHylptGCF/H9e9BZspUwE7R6fy5u9m1lA/2ZKwxZP+QZ6Hzi+1nQnu93AS

5uV+UTnaI749C13ZFM5CMFxO5yxH42Jtp+pfG3g860fh8WoVWx02JGtYvUbgzswjmOVI7WQxmWQ3JH1j

O206LQJLmhI/KYBgB4W4GiuXHGCdyPj1XNL5KGucoR
vJtDrU4SJBC+THDhMIr1IUlwj+5/j58Ug8MOkywk6cR1X++mkY+mxp3YpX21vPJL4NAJjFXZby/bQZrU

S9PcEPv4FkO4jrL6Kfz+jWCbpI7PEx8w7PqvKXLSxSsowkQ5U02Z9n0IXR+Lda/4O8PP4V0Ps19yqug2

T9Uu8J9JADvs8a2jP7+bozTMGvnf/XqDs4hCmfPldj
aayj5dWouaFUFpAhaKqrIsUo2UjyGNOojiyebypmakX8denf7Pc+U6H8BygJUPtQXLKcM4SjwogClKit

TSEtBbcf3LAWsbXkxYIRICIjJn6dwBQvaIHSzLVn/PGurCvV9NieECUmE3spJ13x0hqbwQ0+5s0l6dOb

eoE4nke8SQ/7BgLbepQM8ZAtr6JV3GOafzI2r22vI7
/o4/bXmoKS13hABqQn+oSxKB8zNKTcmlWwkAWKYpCUXOWlm773czFIt9/ATZ8tE8QMwz2q/qsBL8x9P+

9XflB8xiDQMv9SAt2XIRCdCIqIBxGiToNEbUAzWf9NimLV/pnniERwDnY8xF6HqPCKwYRMfAeC3/SAC/

aN7fT4sNT9tNQIirB7ccSsO44xdtyIEAyY3vo8sYpt
99j8/A6r2nz5olAfK8fKKJad9y1pOpnxMCx+8UEd29z8Gl5WHarN5qwlP8AcqWJQFzyBk5mmV663phfU

PPCZvJXY0/zGEvsHuUqty52plgP3rkWoVdfkU+1CarKLneO+QWFHAooOhkEgjTl2iBBaPPlH5HPP7DxB

YzVawBIbFy/t2PTGBCCj7HRYiCuo4s9A2ICXw1oGU8
jPj0sdkiLmwimE1s3GVV7ulBZEEYDRiK0gCLhPgmjtdm6kK6LlEj/LgVmPAjcu1bCf73RJw9vf08p3av

ARcEyASkFiMSD6IsP0UfmjZLPzAH2vi3xQNkKiEebn+h6qtEt348UvxHV9fZ1dzgUR8wr/q4LyZ0DdFH

HPDJtUqImv2DE/3rvKHKpypZi/cPznDJNkCq3N9/vS
9c92rudyO1PVso+LsuA4GHr0YTcl74kgsvRKN765a2OTCZrGP1r8xguryCAss9rZXv/+9Qo276RyeiPx

aRSK0yq8pAc8ATAH0YBB4jwl74uU85O7ciA1Iu1ZLsq5jFE0Jtdd64dmkk+XcP03MbNplwLIlLYNQbGe

UzJcOlAgl1vxq1n8Y+iguzVP+hBbKzevRbvkgtIaG0
fIxVWrfq492aoFAjbMHZ0S593YZ+8r1f5Pa80/qjDPZzwKXZoE2zlBJ730RPUksQ8rgTKDDvPjxDVTLp

bNHfljFt7v9c953vchJppx8R+HETzMoxT90YPhv0IhGwzxBFBP/aHZ74eVEbigiARbH+7I3SXL8EaQya

jYyqtiwW2JBeCd0i1nPcdnd4M244PpLTgXnTqLeOft
QmoFTbCGdo7OO8uPLXMMkYBo4YSpC8m+pbUquQrMFgqu6MEzIXv4eEMR2S0jNbHDwY+jHVGwL4PVivEM

55pjHViwPFa4ptHbycb5EpeyXj7IdMs/8MUpWjzR6eepUreT7sAnCIS72soImd7nF8NosFXtC6jUrc9A

YiKGkZIfGK7ZKEdwPsyChjE8t6aUUgyoZ1+GqBIzUp
FFmrveygdkLdyH8lgSG0vXyNUeWwv/XvHsZ8cClmUHtI1XjebRbrZXqvAiazPXPpfrbcyeX8+WHTBjNT

aNZ2XXMql0/us0gfOgC3IUm/rB5v/ywPJ+7+ZadHTSkGhHVKXvbKO/ymF5p2eMNROvZ3XTN8m5/4iO3U

9fnZ7LTNpn0UrSwCMHnZHfZYtTBO4+tVD3rOB2h7CJ
ZIsEImoar0g0LNQUFmeKtI9LDvU911cxqznhc6bFo9W1Ll4SvNSJUXVk6AQ3GWeUPrBbQ1hUr+6lqocb

+Eo6H+y8iyWV1WcNNXP0fpAmTCa6vwXfhz+paWP7sVyc4wR1+rSLEOWD+Mcpi7rcRoiikQPHOfoua3iD

ra9gwFQBce9BDURYZfuzh4ThM2w9o5VFCFTFmoepqL
mv97eUCiGMtufazZjgjXImqiSZpihT41QEWCu8lJaNaZuw6LH7uzyv4cZyGm1u1bkgCeqx6x45Ak2pCR

htOEoHZBdenOiUkKnzdYnTO253y1a7V50Seg8Fz3NWXGb5GA+9BnPCB5xzUqSVnsF6D9JDmNlSIiiG1v

F2XSijV6D8fSK+k5r07oyEKWyAe/C8dHweYZc6+OxW
3W1DnhitdAx+/1G7qmiYXEBIZfxZJVCclGSulkh5Lchza2lX/XFEV2+PvZCjq+LA/Up02ZMY1rwovRGy

hYFnmVt98duSE08lk5m5enkeYsw59WzrRALj9dvqGa92yXPHlCDCHylmnNrJTKAORQeuloJ6wZHBmIz4

BUgw76oRrLo57Cthhurq8FInCrhD0FAclmFOsJ5+tn
33B2lu48ZUFPxxhruQujnpZToP11yvPJSix48sILg4o3A2bx5uhR5/jFoMZ4vMDlzpwM4wyNMdOuQPzH

ENCaqtiSaZg+BA/r3Qj/I7+Hoa/fP/p/auDaa3BNaDpManjPeDCueievdyrcemgToFdqdi41c/uoe9rj

QY6DDmH2lr1eTYJ2mKnxUHobO0F/snkKNosBj4siFS
B7WSfHJ8+BoIXIslD2M58pZ6cf+yIPA1PTf8tnTx8frLGdXJEFROhxVEg8BXzHnPr0P1gpwF7i314VH8

nV2SENKcUpFtK9J8ov4iB4zzNzZC3Bs0VVl9/fPOTcRqBoqJBZXu4j3qciNQtwuQPrLXCOQgijgqhL/e

ujesYBtKjH+56DiamXX4vw3S5/ScQMxv/zRbFQ+sN8
iSnK/REsZJNln3OvgHqnQh8td/eNEKW2Fayj/19ebDgrojSFrM26kABoH8a9wqtRWOkJMdzczt4DOpEe

GlqtRvPd2sCwDp6uEYykkUPXd5me8qn5+hmr0hCFeZudQp5sLPpvpyicvpOYj1CA35QLFpEu9V8F5l4A

a/OHRlOFjoQmajaaWd7Zu1MtY+vIDIaGjtRoi8/HMY
Byefplr8I8h7IkLeVNO714U/1UV/tdjf2QBqEx49xuWOqH+cEAFW0QWxqssqTT1OXC3heMQn3WJOqvoU

46ooV9lhNfe5SgeCBgtsBP+BXQ8LJIew2Psr7Dd2y4PcuicUZqfYIUK3g33WgSgJM6T5xKVwVD/A578S

J7dtTve73Wphn0jD7uWy6VF876dBqTuhm92ovz7snw
Q0PrWkqihk9Wgu0fVzKctJk6p7zFoQ4hQ41bsbgm/FKzrYFDXMWCzdJMnwKyZxleY23biu05qZJEA12b

ytImtvSn5QXroXUQQ1VpG6yOCuSA7yqq1w8KM45fGgsUU4i1K856kzVUb3m7FrqIRFpPzc83qESlz4Cg

bU0l92wpz2hbXasJLI3NZpfuc1H6k7IhE9legQHedA
ppq9/zwu9FKFfWKfe/XYhLWRwtlDfbJ5FhD/tCrItxr73+qXjoMsha87mWgeq5iHuG7Dsww7ChaW+Rh0

K2TG1Kd1stYVwW24M0H8IuddEbjpDZwPw9nQpHsWH4MnRTaBKQoFI9vZibuJTK2UlPrKkGx0rYMwVJCe

0ydBczHfPh3J7e0n1YjXaQmXuWLI52fxwKv8mL//op
duEEykbGZ8Bb/i99aO6MuW4V9QuamtD+A43W7A6H2dnF3k96b0dP5HudKNdCb1j2oIX6VNsimy6S4ma5

ecBBwoQQu6dsZzoC+CL5IizhPVcfTQt4k8xiV0IHF+lG2+esxzcYTR0p2aJxNMP8NMm86nBjKXjpePll

/vLep0cX/eyBeTupP96oNOWRGvQkY8moYa0suvsXBn
64c5T4cqVX3GGI7qOldqNnRDaH5e02u+PCRsXGq6RL1cVK/hnML29mkUIf5cdWvm0/2018184LHBasIo

wF60a7eSyXUFoPWHYwku7pyG+KhmVk4N5KBCo7WtG5LAQtGzoa3rvJ8S5BU1/Ki5Q4pgAG/eki/0zOKV

/rbQAMvsi6hHGqAJBiGosp9xokKM2+EgspQJKvd6TG
BSjy/mrCUYU5iEkIz7KArnHUriTpUQ11E2D14hI/xo2QFP8QFvMYe4H+kC3/ibfxqdklLjN1H1RWUpPV

mKabZUPmi4U9I9PrjNeeOgodSKsDcN1wJSFxvufOnL2dv4VQABjtElldr4M4BVP/QTd/98p9w9s2xUHy

40Q4NP0YDlInttBZdSgylaPrujQW8QOi1u+Y9+Z0AO
VkOW36w0ew1neDmDjPqt6i1p721YtjDWFv2nbF9cnHWgdPcpgh08mDsda2aqphA6HCGwdAIQFI+aVc04

/IDN9iLqHlAHqoow7WDEsIELeXHNvfzjw9V8S/esTo7Aui//1FTetzOsKFVk1kn3abeAv1W5mPj0/F0w

veGTCd232iNKEATLc9TaEM4Jc0IxOtzL//qF2g3fA3
8Jujaxc9iuDsf6lBWpt3WaH9k2tBM2aeVQnIs+N+e3FnU+tbei1RjIf8Jy1HZA4GyDco5dFPSluA5V4n

kKM4KfiMv3p/YQyEOw7O9OMB6qhF7IF9jv2a0d7tjjrNC/R2zJIuPFQj5DjDYG4ap/WvxAGpwH76TAq9

05Vxw7fz188diTiBURWvHcdFocPXYePH+34NVvlimY
ozvaIOZ43IAFPas90w2grT7zdjaS/Mge21jiH/ja5gkwc80G7csXHTg3aEF4TsVfbm9UtruzSHgM49D+

pP5/aGofDj6ALMCt/x0nJM6dk4Zu/DMQUEJC9CPgr30JQn1ykQ06nmtxEhqYHfvD19X7K4YIFf+W2Yg+

NPVA38cBCu0PkIopLoDtirmw0IWRVJU0kJi6qsBt4G
jmkvajivUjnBu51x5Eh1I2065NVv4kinlHb2WhOQdkoYegK3Xyp99YlGY1p8xkuJfn3xr/MWPKfhHlGV

OiFlrnFmWIspM1JdxCAGli5I0JSLdDKVGMv3xPMCSDc0nWsW/VsLu01veEzUt1LpAIHOxdlkf9XzwY9A

JbJ4My6SMt+9wrZTIcAgyMrE2LLXDQJ0LLFPRh6b8z
VBkdh5bRyBN42GOZxqzUfmW00ThbQ7ECLAJor72Pu7BWH50briBSCiF8nzG7a6dZnNt7H93oQndDt0WU

12VWaxWZFjIeJL+Y2N8MrIx/xwE7nkLxzCpYqMhxjmKT04XVx7hFbbpL2vTEzLDztLI8PKnkFQ9q6zuZ

Ss/07AVqwrpA4Je3kj8W3kYGjGEz/QLuMlqtcc3cFZ
C13q9/Tqb08M2vBmyhizZpIVdnWzlYAV9VpupqqoBIXwmzpCel8N0zSx9JhB21U3kQTzx/Pa4C3HPMNi

G9uKHzZzZl0KyHCW6WfhUbwsp7PsogVKG3DurKSFVRj8koi9ygxUEZt60C2dQjAx903+DgjDAaLFmhF+

u2zyp5yCEGXGMMwQfwS88SciqOa/2IoAw1Vs8g3ibP
NXJk8EOxzqeimaC6ewQrSEofC/Z7crUxpCAixnfJp2zAN8mTGljdMiwzLizCuf6fLLeg87mX/S8S9e5I

jH2vQ1ZYIPAiyF/J0RR8/ISrpljumSwIPYp5+CaelHOX4DEY4pYwX56cUPYll7ql6xaxOOXt6lBt1sAt

/jAGhVcb6NCorOxlq14Pe9VDP1MWtj+fqf6B8bvvRS
aA9qhRSYl+Tqri05Covw04LXzUNPmfaE+lGpOoHb6PpK8gccVYMSc4Uv28zDYjvJii0JQ24tp4vzlJ52

4HTK/5sK7198in2U79uKm0YewWHL8arb8Nzey9h0wZKtN0idVm8fhX0yrIfn9pI8CJiqDTeiDUrkt1Ep

X0w8nTOlYEyicsTLSvK7KRAB66TM8XlWxloqWjzc8Z
avzmaA23QQ8H6RE9/OxHYQYolRpK0zWvvyufb2iwVuI7L8h/IxlBnsV8LRH656YHIBbepQYtdJnmTeD6

jEOsEUpkchmBMvgXH2ekZTPvxlAub5FI80jV4HQyPStTxeMjHzvmUik57CUVyc4XU89dfvlOAyVJajwD

ii/AG69PBtpfLkOonZraLmKQ9mtxI7KAXsjp09ElSL
a63fAg/51Z7lPlZKCxuPR3yhlMOr3jC054VKOt9D19oUm/5qF+9qh/96+vXu0VpIlwUqabzeuBxQBKHu

NSM/vOEVgmD0EC/oSsVKWtTeGyCnncmwrxMp50KXICq+RQ10n9SclwdebN9AEZ/d2n+W2a03rsIZDfGF

bjMJ29gA+sGVE4rD27LJpx5n4rIzmYoqG0JX76Q5qd
6zjHEj7MJ0sPFgFc6Kxo30mRWkbnlKcn/btTYKj1iG9OYDT9n0F2z4GDDk2+6ywjQMPlgz4OQ0y676C5

r414LjCRw5LXSh/hA0UQzc+Iy9wQVGVDK3cA1bTCBEohwHZCq5ccb/Bi2/jODfDR8rBg90a1dOfJ8wFU

ulkRyECu2T5d2Rm5CBo+l312a8+VThZBsOsAhlpDPF
W5OHqu0kSRidNhmrS1ATNhqZHuIM4e1x2axPheqGl+7LRJTP+nyTxo7oBvJ9Z7QwFa8QzDOaNy+ai4yz

MEEWBZcvV+yoY6G58ajnub3cfNomj0Ui6I3nIAEZWYgHb0Tusojx4E+CCQX3y/cgvodV952UqFXadbeQ

b1efIjxaGGfVYYx6Kj44De181AfhbA+++5VzjtPyGi
D15u4O1790vyCiceE/I0PL/MgQUojGIqsWTXrGCrAsz9Q8Z5aMztfEERmL0cmKv7B1DSXyxGg3bvEM3a

zO9iHuLusnyu1VOy/JB62y8y9q/LLn6Pkwq9tAkcIA+C17c3Zwk+isiCcKHlX/0Ax8GicP6gQkcn7MAr

MZg/yAwq92TAgrOIjNs/X/UBDXP80OpgrI6gb0nRDS
C6pfWRZbER5zw7Ozn0fQPwTKG9ASeLEccLSQNQE+tGZbbWxebTCMhINGmIh/ljFAWq6Nru3Ye259q5EF

uemGOMkQQFGcPDaJC8LzTdX0K5/xhpogbg59cnysteZX5nHbcXdwPk+/rN/F4Z+QEE33sourEXGiLMP9

pzgzlaHo39t4tfx1XZRuVdn/UPigCtCb6MTW/xX+6D
WOmv/tMNTWhgei38RLx6wPV9rCgUFvXneAUO5k0OFzg4+hTdIoX4lMAdz7imJY3WpqDGG8k+ioPZWIt/

pklMldP7FZUh9qHsMnx2A21TvUo9/+W3de3AvXnqqHl4ZPaMGd2QghS6qVtM572b3qn6heKrS40k3vM7

7XPC/fJD00j4Wdl4AI79SGaw7uFw7M47nNG6VCjII2
1mAOvNNaH0Q1GG/eCC4cEH2dE7lIAkVTzH1g57k7UniTqBii9ocE5BvtmWigyTZfRWkEK28AIIT+aJMm

/kE69I2hlayEeJcfVPSFmV24T/5SJYCgEiDp91lApyxbw1dYDUNsYZsGoOpvBHy70ws0+NQqrGka6aHV

kFO1X1rPoEaofSM9EQ2s/Ob83QmyDjzgeolFPlN1pa
J48YiQU3aBPUwn8xis5InCoroAgYjalLn6BvzO/aOqOte3i6vdkFEMtGlsYWTLcsS4FNM1zWNjuv+Christi

DxEcFwaGrqq77BbCyhj9cDsFmhJtg4eCBC3Qy9tg/hpqCwwQL2wVRezc8mGrMnIqQSapGxPya/bHpJ41

kV9+fE9BLUzBSdteeMGYhl6qjs49SqGWg4T4hwzXoz
UP2mcKMHc4Fdd6eAEbUkANjU5DNvy+pnRstg5cHqXb0ojTTIybD4pvauRNQBaqmPeQknwsWUy28Dqnvh

DT1qoB/QqFOuTacnTEtMp1r6awIgGrxQVJss3xJQXDP81yw8BdfaaLApt48XIirE/1G+HHc5KqqnPwN+

Gc0sKm1W2JlCkamUBhPnpUbh4L1vNOlvJKZCKNKq9m
20e6HBP/E/BkX3/wqKrmLbQfQa0/NG0CYtPsMQX9hS6604eF82NE4NhPGXjd0PMNv4ot88waLMvxxOVO

K8XZkIun7Q9BHD+RXPbTTmKFEkT1fnMrYjDJsw+p+J0+2/M21rWX6MwKeRw2Yh/9UC5Yvf7EOInH0w/1

Ikk0xOq/xA7YFPb1Rs2VQ8KXfjmFzeR1eXgJ4yclGd
ElNuX3/fdNxiKVF9PEw4C9ND3z+ChVxv2mC5KbCEcr+bHZf1UEHZ11EhG2jbjZGqIuJeLHM7FhYT6r04

4TPv6MqyrOeASkWrKihYEMy3E2MkUJ8FkTxU3exAcaZBGKWbEywzmJiBRdSrmKZuX0LD1yygXYQ6Fu4e

IxrjOm0PlNPvq0tg6YzXIHv52kfCspzsQsIfrzwK8b
/0mfbGnDl/xonO4Ti07amr21xSQ+CoerLC6AhMsSMXp1yN1w+m0kzxrOkBoQNfckrASXzj06w8iNgj5H

gr9Jpu4o888OO9jFu6xBQPOnPmQFMELXblxSMLjhf5zsT+cwqw9PyHRRnDJlLfJV8J28Opw8TNhz9EE6

cS7iO+1QATA6OxIfBOGQE9WGxx374v+XbGunjudSbc
b1ksW68nyYjoZozwNe57MK1Q5Qs+v3UjokrXWqCt/pB9qpa1C8xxEGClazA1cPz+XjOhWr7e8mcKlILb

QyG6WMlqbaBikSMbntaB5u6UOLLXZF4uIjrZSrBLISqMHwcEEK8/UtfxHayPAoOnhlJ0ceJjNALS9kcj

bck5Aju2VRLV/pCQBw1+Mrj+zSwq3adp/njso78XOg
Ib/GgJRjicHOZd49VJPB66uYnAXIYqVf1ERHj54tw10lV3/s2QVi/HbuxFKGRkv7x8iVGJI18fc2v8Zw

ow0VXS/FqSDekVE+Ri69Dd74tJcKI+RsUoDzWxZWny+rl7PeUsvN/YBEmvWJwHtI+cK0n9Q0AxeR1vDz

OmDEd7Q4Hti8pnAr8x+UvKr7Bz09DQ2hRYnWTWTbfw
BIRtox5sXeIAhuT03MiqiXKTf8hQmQvYCasv5AXN857hEhOvDk1o1sy8htZ3weK/5LiXapVi8cRovF/C

zYfcg2zXl/Yeuh6KdtGUWEBWySXmvbTSq+aDl3ebAwsFbHfTfTwLuUNPCe+IAMQvjUeSrkidy7diIGKG

1hVGBzTIlXvUazgXH0FlpjmiTDAn+B6Ar+39KRNcJF
n+NijZXZXSyZEiKmwe3xOzvduSWLSo+YiY1HRTLvsjrIWaVYi4cdlRnBrmNcmXZOG+ftP/UYbVDbfdBY

uOjSFPaiwxtIxBvoaPAnmAWZDWqJfwS5iqShLxf6SEVfwxtZkds7XwDk4CQG0gt+yEu9akEaxjyxd0BA

x3NaYlUibAt3QJ+GKIFiXQWXRfVekY8FEH+ovlI+Wm
3rrhrVrgQ2XjQSvkWS0XC63GpzEWjAEMDHxVhpion89fFaafhDKrC7DaYKoHMzD6prfjibXNNntcHi9Y

l7ypvw9s3+FG2o6oXGFEAwKztWXvfeHRvSSUx6ywiETC/ZDwPTZ2KsOoGNWDEzVbP9O1Lwp1e9d0xddH

6kGAfFCGGZcJ8pvGepSyldMB1sPvN0axfbXtruaKZJ
Qa2bMoo60nEFZ9n1VZmD3GBjwL1jE15hI7VjhyqCw3dO/4qPJIn6Gr00TFLjbjSBu5vNCG+oRjx6iww+

E1bTlEYEdVCJUzcVLp8bAsoDAehVm5x4sbe6F1eaChw3+Xve62LTqUT8UnHEEpKETxHzSFpzCiTk21qt

Xd34EQiYTYgiVfpezdWqQBpPkzBkoeA5YL+wPf3WJs
GV807FOxKtNaccJGg0Y1H4mZr04jQRh9S0O6fgIiPSWCRn6voT8DXpO9dsusnkGETQ7Jal/52v2+jDkr

vqgu1SIKYEwHT9ihN7VEH0JJrz70fAY09wwo2kZo3IB5U/RLM3Gx/zbTImgfvVfxgBtSC1WizvfzsxuJ

ZFrxWk/mD/HLhgrgkgIkIPuJVKkxu1P+KzF48Yxd4K
5Zez6q3Ep5SQfKrCRX1bpcb6O0yVwnKtw+890lgQHFlXkiBjYS/WinhT6ka9/rG3iYDY5+elyju0b3nZ

iS0Lxr3DldBdcjBpETPZkQ7hn9N2xabP86awOwtzI+d+p6ciyskRiZz8kkE1gTJ5GpX/6gIkM9LqfsWb

NbaTJmmks/tSHPRctGSZm5QhiN0sWppIAS8TDtgtaC
NKJMdYvWythbapAPJKjf32JcV3koJkp0uFUvqfJpyywqYDtcBnjvqKpH/YxulA8APPVjj8ftgsGjvuuf

3mc7RZzwWFgbG3P8bQPyV74cn58lteF50cLt+ucCtXBYtai8OrjH58nqXd7q+v0E2c4RursEEiytgx3Z

BjaHdX2jZP2uhf5gNj4jcsJ5wQSx9wSE3uJWobZZC/
d6vV/zBdRFMAP1aBybrAAIKcP1UU3iA4CnjP01xCHaBcm0oGtLtUNhy/s0wNdP405RyWtNzO4sNx4gQU

97lvaBCMEXiHWWwBI4avjLLZERJXHMGMkvY1VNP7j7Sx0Z+H3i7vaTQu+NHufLBCnB9isGf/E5Mxgfby

GTNgDlp1Mi4BcsYqFVgCl1Qc94XdZZO41zNYMT1OXA
3puU7Fd7Iojcq87M0X3Plx/exzRIBVdeVLGgEswrjX0/Ijwu/CGMOb+SsV7EHvBbQeeetzkcRlZNWQXw

oWYNQhrKCqEVvNILGzEHZWLMPhhYXzcFi55NyoKTOYwftQbdFdGzLQN93hXvVbpY6vcrzl4395VvatuU

w7SRT10nMOzcPr8ze5YvS341kwNew+axJGo1OMDoIR
PoRM4LXdYVSBqZHkNcNyt7HjKwYHlCewbA6oAtPeEbPgk5SiYjqDhy8dpAfZfb8wP+6wbSndZtbbLJro

eXQcfhhZxsP5NZoVMn3D5pBQ6C+lr8I3wbIB+STZjAeKFjOmCr9c3pWPrpS4VLkAyqlbB+L4M5YwgXwr

NNGUoy2z394vuhQ+9Uyk3Y5yplfAy0x3OBtFX48eEt
+gUsiCukSkzLpk3GxUBFgmK8FPQNjkM5gcTFZBT8m/1Ul/HBNSNeD8PUNptatwUMWj4+i7EvZCuIehhG

j9F7aGflgzQue4At0fyxHJZ92Crkt7V7UtycEiWSyuMH9fEE+owJcyt4EEQmWlb9gDGbqF/etMqKbrNL

lZuW9WsgI2YUhPAlSxfxa56ifrJOH+ZhpF3DjqH6HP
0V/u/9ObKSNp0qn+NQP6o1rrOCwhVPEMGfjbgrYbMriktOvRPFW6VYUxPtK6J4gMDLdma2Q1V/09SCIY

xqawz+3Q3w7toKOWlGRDINA9wnrLLazGLJEQNzZw5NCqLN0u8aDyyx6Py9sJrTK45qJwwC0kE1Hdur4W

vNVa1OsW8HPNVB2W3eWebSpkyUNctFAbGgePBmnzgp
cy2fE95TgGVzd/HrDeJMQAYv4veOrrjW2NP2D4xM1TTiZ5p2x2O02k8dH1DHp2MP/AOiJxytQzF2C9yD

wUsHfTfnKtl3IcZE1bJqeXQOJOlcYF6nerhVSrLcATpl0taPdJ76A21Vw07Pq/2+cVQa/4NrVRf4IJZZ

nCt0kFejkVyBgCN6WNFZQ6RawzpnvcTO+N67WW4onV
2YYlKa453YpiTTH8yvChvq7nXZhZXGjW/vhlhd0veEqLTxFAgWNu04C2DD0iIYOWQLa6/s8lbwxu4gsp

6Kj/WZhbP8UzO6Q7wzNyO28D6sfGJO9lkKBLHMmZ9kjSiQ98UhTRKURtAiJWPaqwG6jCM2p7TCpNzkS2

Rkp3zR7iuvrWR73vzffTZn1EoPcgO1oUNi4SnUUJy+
nGYHx8cIhodJSRX6+2AYlBEQEaz2i4Rv5TN0uMykybAJsnmnKnHb6zoTQ9KP7rb/d/idN/3GgmA0TVR3

65OkxnWZZK33U4NHpmMf3yasJjuzRBOaFtmFTMvWcWvvIHWVrIAtoW2nCI0KMqQ5QGgJRCpfRADdm9kV

0uvF7p4UC+h5QoVYGawkYLOVsxCrRKi6jrMaWUeUTV
v9aujOrPf7EU9zKd8OaOeZthPZ/ac7DCDrmlMuBQgi3mqZ53Mer78feLzpwihnPa605FYc6PpDbW5wOk

+6e94GINESwWiTtEFOEfR0q8PLhCFF8xTaL1F1HoDyU6Vrbr/JG3zfSRdT9u51ks2JVnlzm+2I5HQXRC

EmW36h1zl7PMWojcwMrpaM63/LdBZazUGbVZFFOo4k
CRThFTlFEzpGx1Ucqq0VHFPGIykRO+7umA3ejh5Z/Jhn9PPLiCNGIAAxI1TPf/3h5NeeqV8UPMgDjFI9

8yJcj7cTyDgf5j6i//SyVHOVUgx5RBDCSYA8ye+hKFV9XCTu5C9pAu3iS47qyiw2hxhoI4pgaOTe8IUV

BotJ4apgLX46xp/O3d4m7FyveyPP3uNCcPhWrEbAJp
y9h9O8cQATUXz9azzK4sQ4zn0Jw21SbcKQwal97MsiN32FoKiLxKwk2nyIJcUURc+Ds7DOTsD77b68Wr

tgm+jRcv8uBSosFPwI+YqtRGEb5cb8bHiZF25WPioni/boJ0eWw/90+IV7czPH6yag7/srd/WqL21wuR

xkJloLhV9+ABvdnPmb3nuDRPzNWVim358FgreENvgJ
BkevFvvHL6fAtGvSUdzSJWV5FnKFUqDQtN2Bm4mUQ2JmA2cOVwsGsGY8MLgQnS32hf7/LxwxHX1lekl+

7XAVHJjH60XeaoSw38v3paD0DMLeu4OFPt05ziWEYJNknuXZXp2wZtslqOc8YT+agyKHMl4ofvfd97+B

Ko58U1UnkirKKvcx+SS4PatMZ+UTGDuR1b/2hdMa+5
kU1ouImh5qhlw5tXfuzclCqz456JmsxMK1wJgJkUH9yEKsKUpMHd3Jg+5dqbblCKsUEjrgLUKUvEe3d5

0pKTD+toQOFCHZjR/LHNGomABs2b8OlFETuuFQx1NpkjX7PqpXG8X5lWnsLM0gJtd0jGNxmO1LLhcN8L

dpRNjMZpDLKDexba1RjnIqKmKb95EfYoCwpdq7AaI1
ysCJ3fxbM+G6juqdotZ7unXWXzJcdeAM13vwyJqzWNpaKQDNPMSpKQJu18snWVw9tbpQHlNGJaAHEU9f

eroCcmBK+QS62rGOWxsS9BWm2IykEVII2tIuzgP4gxVPFLXdGY+bZ4JA0c9q4hjHI0sxCytY+hsWt5De

tPGN5hazUy8VCa8qTRp0kJxcVZ/2JJMk9dRZDeb1jp
wR+yhVCH7ejeEjxqtqm+7Ej4Nx1Sf2a5EvFUS1FtSR5sdptyvnelHCTpuHOIWgE2QgadcfdBI68n7OKh

U4KfYAUInXstXSs0oiPk85tA0uwGiNF0n5TdRXKWWdqjUMzzVDZ2/9OTzlNIKnlLkqnrcabGmcDBgAjG

4ATROA7Tr96L4Xy6hyDedKvC632A6sDnQPqtHc+tfa
lfCMjCRyu1rfggKZLj/Nx48iAYYCyv0cR/DsUrp6NYOQG5KdyzrI6rBnGkaJMaNMpaWPfDCOjgxQyCaM

X5b4ZNytIcjAq4EPs4Vokb4O1G6GCK/x9OVUAqI2sy+cxjj2TlM7yP7IODzfLUm+unTtX+lW9Us+NAp9

aPrKgd4ZjOq+wTdajbF4EAp7fGST32RwpvlTf6mW4f
ufX9ufaaVzuOr+ZTGRxJJMngHnQ7aruFkGuT1O7WhxC4x5asyz5PZof/V5YLomNPUwED7MoNBcruWJYg

dNiKYvEIZ5h4a/T2NeuGv7KYq9pS091ChZoozoqe3CUGGUd1o7smNswbhAXyTHKc07WKHV0Q0SnRIpvG

9e+bi7x9k85FhBp+cS3oH4wzGL8IA3ef5hol+VBoeK
D9hbn0AyrmrcveZE4sEPuR3r5X3APDzSsNXI+D7sctLo5H8nFv6wwlMX/J+0Suri0IGA+ubGaPQecZdH

mABWBvUV9X57cNYTdmUwQWh4hOmxsP96aducb6GbMXOrLmgPBZWqqjztMP9m/8s7/t9TO3AyR+G6A5Ep

0MF+fuQmQIh2fjhu7B0NNGnIDOnqa5lSkR2n0njNeK
nfeTyAhOEex9Gvm6ssTehkLbYKpRT+VqHq4m6I4sHr4m7yiWQro9kTypYNWF51WrzHePd00fs5rppNaZ

YxlRX2pCiuq9n3Ku+6mSLVuJq07DgyWGZouI2Mxxal8GpB4D1L3rIxrbBa+9JtekAelf1ngO4IQQO4N8

cpFXzdYo6neHkju9dCAXXcnlPmeI7GbvRXrjXvaO7Z
diMBV1yvjxlYX2VUDPwr+oPrIPuGPUjbfrPCW7hKlK9t1OCreKslDIJPAUS4l4/KdWTq1cS9wy1qNIh6

EinTBedeTpoSU3kjx07z7JCv3nNqNbrzPIG6u2U2+7orbwHV8Tgjd8blm+es7vYghtmG1n7Prl05QzrL

8TOhPxut6Mj2fcVGLV1MPjTZWq9q3DgxCMaQvSEnge
XoWAnN2lVPH9bqDOJ+F/Pybfs+tRa8J8eFLxFEs44MGj1paSu9dSsRCI4IdYNuzBoAKdhpK/zh4NZDs5

FJJh8zAN4Jo9DjTEPPjNTuNXq5j3PFDmbAoQ3F0Z3gbYRc1lNBaKJPqzNppLL10w09C9b1cDhYU4X230

V7uxYmm6T53ID8rmAU+DajY6ntZUG15ujLbwxnS52E
2hyvpm3Bredg3Dro+l9v/Qp3/xJMJm7+872cozcTZGF+JWEJhyCkREp1ySSJ5DvLIJS3gAwVbcnhewZA

WMX3szaMm8cERCcIiOtg2nxz/4y4cwO6fZUY6aPuMeYdwkqBx3hE2mS9oq7kOYKL56b5swYEPYAlmtiO

UzZqJcq63DfA6TxVj1IGmwZYRE+k/1b1ToN0z2T+9V
yNTWKttq4q0WQkRvhH3Dv1kqmwjKF8cXHP6MarLPealynuMw1tBgL/tVevNWlkhoVD/YALnxGDiDchJu

psxBQTFcwcnYgqVDUTmmKoofL/+os2+gSSI3ZJZR4H0weydCMP6XIaALFjn3P9BaVS7xp2C37yNsjcqO

7mP10bbRyhAIvS3gVvAhhvq0P1VFX5zhjwUzjYg/Jr
dzYg/IRna7O9sSuVNbOImyTZcbW9vMectvNpDwfLg5PJSfA9adKpw31QSvOq10VhdSLJQ8cD44AKW4nw

voZ0PX+C57LDeH43GG1oWL5FnRR6Fu6LQ/K/e1Zd3Qgs/U1J7sGMl1uELTqa7Bhl0dgjaSLZMGj4ZWqU

25EuZx1Ht6OpSVQACh6GnsMMCYu6iX1orx9K1sz8OH
hj6ByGhRl9PW/6Y0g9VCSS3g2Qrq4FeOEyTSTzT0ma3sWBYBjEScJzqYv07Ln5Dslq87M4KbG+CuykaR

IG0ghuJHNr/qXHfeomMRfX+WsUrV2m/E1g+Df06jEtzrga/ghkE81jHfotvsbld/fFLBT9UosKBLlvQy

lWpCXBZmT7jyRL88u9QcsO7Cf6FEXP18XjnceEajL9
k22HhkacydqLOP7PDl8LTSr4KAiMfQWuYIt2hXEw73ky3p3/Ryugc5jc+DbBoFgBCuiXwoJDCrE2mTgT

cbiJ1GDQlnQLEfFDfStdvV0VSC1It6cgI3VtXwdFRIZopLoXLWMzZCkNyPWCorch8fr5dH1qPhsmd+3q

RkB+wae/qRoCsaEGXTBkIptEdEvaLxU8xZkca8Kbv/
9G3Vt+RfmF/4GmISFwRCWJKdPxKJ9hEEjUPQXqyYNGaXTDxrPs4aXDOK6lJbV7CcLHmFUX7TN5f661ex

9wXpzzYr+67291nc/gN0lnt14VQzNee8UzEL2nhTmM6wIsXdmle7eFYf2bNYsAyWFS7QEi744SWw9tMR

416qFY+NswuWuCjhXS8w1DvMoDC6Kr9rr0YnRlQe3m
65/GNP4KEow3x9meyJjN2w0acAvVpeTwZb4RS+9Brho81qZ4Z9QXXXuAR0unvxCyhQ9VGhdLz1rogm/u

dmH8DmFIvesuFVFKLHmW8YYdjk7cHW6gbyshfgEY1ZnWWv3hkWr6It0kY+d+K0s1irE3vQBVpLULJXxd

RoB2owxZw/0Z9fA6lsKfbWSA7HEkK6ht0xS8u0GJ2s
6jjaCvdtmHQnzhH63UsasfuyQDiqZLARHn4QXnumX98SWKnJHS6/Xz0b60U4gQ7ST7Lid/qBdUAak+DL

AuIxjnkjE8lW4jA76Vl1c1+PfLbmqlCpnZoGLSWyNsS5ZYwjhTIDOZLdd6SPZI4dmXlUwBrXZEHEzRsL

2h0h5ylEVZ0pWGVo0tKNf+uXfIzDzg/60jaNrbVOhs
yBbvLbUjnVICEWneufziUxEDfdNlriiXYej6KMO/XkCpYYMRVXzROS+AjlKVX3AtmVsn+TTnbitvdd2N

H3UlJ6j2W3mxCJmPcr6eNSmSdGaJPcXuw5UVcZDL1NoWyGp4i9/W5HKZBNEdqRYenDHkV8tkwuM9+ib+

gPwAO6VadAZid6wQ+q8ruBii9EmprzE9AH0nRANrkm
AML1SedKQPZc0hfcG3IRQ4qRGjVFtkKDoCJvNkuJnukykX4c9geO9uH91dtXGzgLBt3OxvMInnBG36LO

AWizcDrYEwM1Hf0NyCu+64HpKHHZjkMO+qqMd3USCnisuGi9PtYvtSSb27eiW75oNMB22m0bbVGN6w4v

SoYwLmNpVCdX+RI9WuOObj1pBrtufqqh5GTTWi3S4i
Cyg1JsM2vZwbVshdQ54qVGRtmkpyRnRJ/0qCJzEMIrdGhpWvonqHzUn1ksq4a7V/PjmHDVe6q3MHtIFg

N5qK9YF5ojO9M8tGuey15eHpWBBAzGx7zmsrmG1eBg91Uu/J/2pzd39uR73sGjNMtQgNqPnKVviOAjsw

pdDigRqkVe/lgq5KLI4soUZjc1JWaU7yzrHiTYvhNC
pP8b5s+/ZqQXkR5RkAGubzP4M17339V4egP6bXzOHb4k8JgkUrEIyvd9/Bam4xoBRR7Yg0AgEHkJPzLo

SwSjyaVDGIrwSXouE9kmVXCTo7HeYied1km3zbnnn5f9VEui5cJfXob1/k1Wl5Zjarnl6tDMVZ8YQaYh

v1y7RRiNAJHTCfmS5IF8w99Ik0XLUJdK/gGY67EBOt
6RGWiJ+6X9abMt/Kb62aFvG186PCxhXeYZmhBA8/hQq5vN2zNUGlz90JtMGMKuNDG8XgfnZQoX27TJUC

EUh6nAjAiR/9N9HNJRmSdvcuCIYwYeAFQjEUAeyAd/Tvvz61h+HJMKKfKrgPwHej/EuSPu6QvUBakM0E

ZvlgMPbPRUPMC8BWdMVmrwqCFzd5qvseQZG1j89+S0
Bq8AARx1s7oa+DutA0pbwaj3npoPbqY9w2AmSj+iG9wyhVoVgHknDAbmkI9CCliOQIA0so1hFAtCTJm5

jhjWusXsi/MjtcGWQhAdGn4N+mkEVrh0WI8BhVJ58yKQJEkpNROLhtQ7lPsryg2E26ZFayut35rCrpTo

/3XXUGlKiubezpNT9PXDKaP2HTwP2uuzvvP/7A5Gm3
JXBR6Inh9o9Kin5DcvLvG41ZfyDJ8LDAVq3Wxw6vdPSyzOpmYf5wS+e/8+dWy/CsgvRiNoKv44YHzQ0X

PYtG5yMA1e2JfBNLsm1abPVTq/ZYo00F48Z/M3x1nJh00jmLCPyAeEvR+PSFk7CIWfGTM3bXgYXiDrqn

2WaNUgo/w7dUaWUAgpOusPO2YzEPzmVRdmn9cul1c8
L7dfeUr3K3samDkqLcetwApvy+ZpnkvPTA1NY5/leb3D/mEe2KqJCqvY0QBk3BM2f8dh5T5BYX5fOVZE

sneZtPCBpVhjfofCiifxi+1B7O4FzkBIuha2L0/uYNbODsno0ve56M1TgwLIQB4MaJZYdT/at0+8lrBX

RewOkvTx+w2MlEggmKoZFwVwxUtO2hkuehx20eYxJl
hp64LkoCBq7IAjtNZmF+W6t1Ns+KMkLLb7nylocKKKmoOVXlEc6KYZcPWrNCfSnK38LKwztwJwbf3cxO

BE8le/VHCLaNXp8AMygHeXtPLC8fVGFBUrK0HjT8v71426ojTRHEsg+txwtJBqJYakansOOlrwQ69qth

7oNpsTQ1l/rdm6OTJctM1JU2beRaBEStWjPpNd6z6f
7AH1f4mL4vNkbJHLdapIRSrKh1wODox2RBlkDTGt08nqcJmhuWVVmGedLrWDv6a6rPxzLEnvj8+eKC3A

mwefmudUfsfhlWh42ImGBvuf4HxxydjpQFq8YciIqip9R69/UEMSRX/P2X3MhRm9J4DtQt1dSctZVCAp

HvoGSA1UJ1cSdMs3X0yNo8b/GkuY8fNLK/J4gyBDMK
src9t42yYeXoBKXozocd3G8ScTJkLfGqH/3flTLOldWxOTj0rWPfSQF6+T8Uy+iitBzQsTjkR1XIp8qe

M0Q+yzFo8c/j8B3f1Rh301VZFML/FsgLHiqVkUaLg6E/sZg05qKTJGrVjUPpII2tTFrNod5R++eWvTGC

fWtRnAHQnata4cURGnqT+uu38ZvfDUYyl7h8+NdFlS
UqlfIb0y/w917U41pIlvEs0riGj9gxYEdfERNFD//kuyO5z/3+yfkz280eO+60E29ybf+06QMhDf/caj

Fif2KCKvrwh4TrvPFZS1MIiZkKlpP2HKto8HMUZVsiWVpibVF5w8RT+J6/k17Obvf2FwTre5VAaNjUIR

ilTblmmFC2F1dSnvmnkMNkwgbZSin0KPIjlyLrcxWk
diblHd9tTra0TS5P94svOSwpo16WPt6pgygFq6V+6FxyiZB3vjeWBK2xCvarneVIePh6cUD5WNsEkWod

RsbNSaNqC9RQqDYwthEcS2iAcQhCHLpfBYp2F5aBw1jyLDR2QtlR3/mdNpz2TlTnZR2Aa3moICAs7cmZ

npkao/2ga7aCyhylLqzbKrdCWzIa+7+1q2G+hmjMrg
cHUmBrSZvZ4Yg+NovBx+c+AbW//1VDI3wE7WkXeCIkcRxDhvfGgBBLMKtW2L/5HbqctBxYkuDzk6Dc2p

sVy+fpyIZa0Jz0Ula7PERAhqDzHhGMg87ejAl2GDVdm8MtugRdY1Xzqu0HEJJfjG5hfFwp0yyWTDsam9

dVZz1RH598vFVrrO9BzUjGu4qlbu/ldDu/n/2g55oG
IJBq5wrxWUhCpvoOSJVZiNQH/9TKPYH7E4fWsKA/HEhjYKEk1jwjkTu5XbVW5oafqL4mFWKGKWn63RA2

qFkY9iJfa4vp27+ZE1kVPw918tmliY//5VFklc+L59VlciJOxnY8dQSnrL1ny15zjZQFkDjZkSqyL5Xb

ykiqN28261J4Uo4Jr/wYYXkJTEW/32KgqJVSjJBNnL
plP+lB0QwbSWBECy8PrRuCZ2x45lMLI99Paf1gSAedV2fxSDEc1h1sJq+w6Tl4KfodvDLK+Bdb8GS9Es

eu0rkbonEOd6gqz8niVcQXK1hB5uKr3RTy8cqApFtSc6Q0hbPWRYBzK7hUABo8zGPXzmcAoF/+Zr0K+q

6a3uEyvdrJ2QkIQUt364sjK1qZdo7Tx55P4gRbkMvJ
F6uIub4/gleda3cvfyySkSNlcy9Rvu3woRxsN4G1lW+AQcBPdc75FE7vceIwKSQaUxRE9lnCU7uMpBWy

6uN5yvlCII15EH3Ra4GCTT9mmm5nCxubIOYdn+Mc+0iGj2vEY/N9oFUjn9hhlwzp7ozl2gA6n25a0Rt2

0lvUwHVh3naIFZnbGmV9ZHqo72WpeNVBWGhIJXOdgO
vk2t+4QXDd6Z5HFwjsuUjNcOTv8CQpuGjhy8Tc9UYycoODCGVL87vF05zM2j3jgCl56j7+lqbn9Gym2K

/FhWT4n1K/xTjg62vos8EznTHLh3zMQatMq/f/bwZ8eyTNnazABZSphRIMm1YT+ZCAxJfoyve90WMSau

Nxog+0g1SFOKy0AYoQFhlJuROnlqQ97wv4XXLLCycq
g3QvuVrTlb1+a3d8dZpf3DfntH8DjFYJA+TqricgXNvHbqX9Q//bOZI7mjPFHoqF3z9zx6kIsodSY+Nk

+GVT1/lqtQQWNni8SjcYjpoXfAfp6j42PBSMx4R/WxeVQEfykG8U/cgEToA4TFcI9moCeaxjP6fjPIYc

fCh62JtjKo7Yr0ck3sMF3cT62WhbsY6ZRpFo1b0u9o
jxge9yurV5A9ZKHSIXqZnQKQfNjtDJpQNkwkONAfwWhtP/o5KCh8jfO0utrnZ0WOGFQSrfEwxgiDWWNJ

dDL/oOQOJsYHpAHbmaTsb/oGITpAa8jQR7UK3/p8mqJRpLIw7bduMcrJmAhZZ3wK5lyZl6DXutDI1KAv

DW2c2bhu9FZVyN9votjZK0YTbjg6Zn09PBinxq7Qap
/F9t/yb3T3pHFJERxFLUUw5ntA+rFY5RL0tPfWKzCCxL4m453QtbKR7wV065jfGtENyAGm+hYA6A2pii

CE+Izk8FkGVV476NTdLikzNtRuEaW6KJ+t1L/DJaoWXUrbg7G4wKHu0PV7YNMEZAMCSg5+xIng8meMNl

iuYOUW4CFs6Lafy17TfYOjr514dcCCI+KOqU5ep8yC
bnxXXMokL7N/q0btnY469MII2hVLKwx/CTINh414I9gdijnobkmFrOeR6PsUocDTi6o+F/8ofQJFbAXU

AE8FfWLoyv37/VLKZXRHCs7WsyXfSjTTieem4LNOWIj8bsT/ZoRc/y8U0f3OjFjSUS56m0cSlvREzIu+

+W/ep+/6zWjDWl27+oT4OdP1NYyi7ZpNLb0U7G7J1w
fd1fB64ElcZDM8o8Xx8n5zdL4hvTbtx0/fd8qEZXMFuVuaTye+gXGC6Qcmn/ldc6A58EtSrW8MEJgpAK

nIzmRzBAPs6vFMCFjE4B4T68P/VQxq9J1DZkXiAH+pyROsA/YD3Ak0u9vt427Xc8bWKC8CF+l7JEpMB5

jN+GWE3vI2CT8OnjuYI+P86GihoCviKjraG5O1edjg
ZalvqNrc5iBhg87sRqZo/CZJKBULfjvlvqTNVaGKtPHtrM/ILPQ6WnVjy81zYfYslk8fxHVotGlp+TxY

1x+6G9lE2N7zIfHGGtMp08w69c1BE2GN1S2Wm6Ww0XPWP1etTfGEoanWFsGbz8poJPeZc6KA8KzVC4ib

mVjqXLvFDvNvsjAMIS6wWmmDZt9BknP2LBySczTzbc
q7ajS/psgTiqDAFSvGYcXDmJ7whHxj+NS+PkOSopqZxCuDHF/RIenFE30QJheQC5zpt4srcbGFZqXjWt

Y/s4QzRdsp+4yAlQqefEprk1K/o4wQY9awYdXM0dDw4AmpcQSQjXEfgIrhjF5Vcgi0UGllxp3HExVwWo

LTk17AFmCEXiD/W2Zd1DB10oRrxFh7At3Dbo35Nfbk
6GyYTCqMNsGSw5b+cgKGXf+ECkcCKPkbfrtmN25sT2RbRWBkt6jq6D1iJlGRhkxr7WkQVp3ow7yEnMCF

+SOlO67wHWi6286cGPKAPvZPUAND3UKhWyawmizu7z7vT1cvsemhHbdUnI8/XJeBUyV6Xk1x5wnhMfIu

ML+kVPGqi+p51Bey2mvzfWg7d/i35gU33FHOaAIU6Q
XgngdPZFjzeKN0zcDAY+d/z+ezY+oEgRcvW027I4RIEu3N/t9piE5s6cTZxRH8WI+4GWrUUOQTPBjfEV

o+17CERMH0zhRN/lK8xKWyPxvF3ypvL8ebwoslb6ynRHgoveYDvueQnAaCVXYJ/VweW7+PIgqfo7YRUP

0zdZ+BKtdUw+MoBDGyKXpM/ZXwlq3mGSs+CCKnZYOl
lgdfpWC+h+hXyahZuhSAZKU+z5VtzsZyUF0VM4PQEKQQY1ZCxv1IOktQ8iTx59Rllff1UNGjwCsPnW5w

dL0moCf8WWJ9Az6bImI+l1fnGtO2h0MzdRyaOTU/4J7j5A09RDbCSZfChSVICkpXcOrU6yYmzoIqdAyN

427VUM6oSgFz0oAsqx09ZGuRowuMb/HfXs7eaiu56P
Y9owHaIC8VVc0bAPdJgyVgW1LqIV2KBo9Eld/1tCnJNdKdGhsMilYwWqib4pBxhNZlnmvmur0g4RNXk5

9zUL6ZrTG0MGHJIF6a5Ae6pktWz7K3H48LCQo+FeKIe59Pz7wyfx4789b9XsjwhPU/T0gZpjzoo65Mzi

gCwixbfSGniGLkbP7MpEVBbMguAX+EnXajMRm57fTu
kx28zivwx7WNqBk0qggk/+KTwUdwwF1JbyqjBksODM81tywUDUiZr2XvQecZJuROHkDv0h7PCqQG0hH0

S95bDW86dABQSrPeaccO5t8GJtFnwu5TIm8oUl437LzoDnYKQBo+mgCiKVP4zka4/znYwXHZcJr8sd06

Vt/eMSojoiNBSJMJ8BXYPC6aR78L/i5yv9Kr+LzM3m
oMbJLHdX+iELHvAYtjCGI7ZCxR751O71hnS6WaI0eA8kxPBnhXDWcU8Jfv7ycXEyqCgc8OTibp72SLEB

uRBr2xLPaA+6jbjEM6DbkytcpHWwjmR8kao3cluTsw7k/jIKBz++CUulTwSPYewYlZcxZ94dpUMjJM0G

OY4dFh9YcQ7hNpxIaBKWy0EspmszLvnumHikKJ8STl
lb9QxlH3dMxPxHBIT0anhsV2wlqto1ljAoHdQXvkWyh89DOUFyShmwP0L8XvxnRWormPw19ozyvzLhco

CZ1QwQayVIx76wsDy/42rl7Iafa2ERjAH/PTiMYqt9m1diGF7oAIVyAUJCQnWLSZeDNd25Ggc0+UuVWa

R9+XbFWHf1c91bukHbKr5HlwgBpaWQMBAdj2ReqcIJ
qlWVFOvR6Ls+hN1gv3RMCkvTjEvh7MOB6+jFMLlinKOGACq2stzwLlgVtCzDyCMK2nnsfZ05AEvKfgC6

6Uq8752Oy+eBXZKFF3naYiuunc3tRVP+HqrOd6mhbXE3MGqb2BxAecFe0HrluBJvlUGiHfHYVM3fiRfp

iiQ4ZzosKtkoTRNshXUBu6Tkw5lxmz+fCRvkXGI+0h
scoH8A+SR+aLW7eHYD6vdGR97JmAAB8+97y/p+Ae57+VkaTLTpB+LbvQQIiFHXwCrILtRUBzp2voR4mO

n5RHD+HUtHB6paUbKX/6VuBcR63zqiqG8d4kCKPYi4oGlZRiEhufbShcus50z6MkJF4nxB9BHIookk9d

j53tZVwbmcA8yDAzsxQSjWl2ln3mUDIKi/zCBfBlpt
oVETh7iTrEQuAiRXJHtotb3e+zzBe9DILEO/5Gn53/VqwgsDA562EKtfAPzozqZPsFpLKatlqZ62gZjY

G5UtXFkz/1BnG7ZocMXCQebRBl0xJrqXZ7RBh1zF/oq0J3uRpqO5GoEU4k+yfi4HMdc9xwJ9EzuU6sLb

XX+mk/phSWpzv1GGMC5DNq3z8CZNpdx6/O1WC+feE7
/Z41LQMS3POgvrz56jO0nhfq1upINyUdCV7n3iP2VviK87AGm1srd2cc/7INxJd9CIlKxF/MY00Z2DFC

KiUtVbJ9s8OdqNjPLNrifu/dsOOOXrnsG0KzkGrM4hz1cKpsS7wbz8G/pf27814BxdAjz2JnbmR/ge/H

zlKbLYThgHiJobv+WJxt28agzNZmXC05XOxqd0bLgd
wellv4box/KQJBELFoQ4l6p0LtE33LMhVWgg+6b1RBc2Lj92oZs1rjp66xv6zmfm2Eb1VrUBBLXISnAQ

hVKzStREYwx8pNtByQU1QNCi8bcEHtUOJp7kQnRq/pWrVNQG7AAsTu+PbP5c5WmOlYT5WE6IgStrPWb5

6bHOKgvWPRK+YnjQo/gvfGshQTnS9Ppvqzthh14d1E
IiIIsBIEhFxCOG9kCCFmviOOh1yLkGOw8Xx8I0jC8jz8aei8kqzMXwhM5+t1Wgqw2g+/KARnGZnj/d7F

9ewV/JzXosgFk3Wkhf+xNcbMc2drGFZQRv7t/HK9OASzSmc1SdiiObUZcnKV5BPsb6g12A2Lgvon+hbU

gs4YmDjljaJMOADd2AAEVQvxvW7k0+W8qql0SZtJT+
+HuH95mPEW2VYQUF21+kA8yTMhhgFVxvOwe4gVT1Bo5Om4BKNYIMbV7JeXQe8/yFuaW9aY6kSBzMKdcC

6qBt1pY+XitRV6d8AY/L6LQVvuhLHaRh8BrHPXLjnlidy+nIIfyNGfdYlwpRLtnFWXXG/2JjExzKBCM4

Vo3dt4zXc+vVo7aANbr5I5T+FuM8kUOB5f754cLBfM
2e2mgWDlRXD/HcWxQQ4Sbodh+imPRBivAlA+HvqBeUJpT8Vx2tHsJEkoC28eb/c81msye2ydC0QpofIg

sCygLIn3V1izJcapRCn5aCBInwVwkTb+BWGfdulOcZH1byZB4HxCZCuxQD+6RooIEECU+ji3KUejDOGy

yOPdEDhWxkB1F0/zsH/iVEg9BIH+GXPwrLOXXfZ7k5
YJKORF038cSNIMtP+EPmPMt7PXgRQiV4pUOZ+OoGeZaSib/7lfaMTM+r0bC/WMMej9fvAMzUQDlYV/oB

fi+vCcEcaMrG3JwFw4sRiVrJU/ZHL7d5qUKO63sJ4VLC300n66PhHwFlPlw3XSsAPRjA0zodqOSmWwrL

dbP+5sUn1OAnYT5z89HVYt+v9NJfljfymRXoJ96HFO
mtA2OmjzVP00BYT+LyITagVBx6RN8/fZfuF/1eXOJlgYd1tAS8/GgWqIWFJqFT1IWJ3DlB358To8gPkw

/G5ZoUndmg5aTVtk6331J6PC7MS5MW7yYWjfLqfnEzQoDjDl6E5jrdNgrNCvEee2xcf7c5gOUXjDM6PF

ezXsudy/EexqFfbcQmzmpEcAUoYGj/KhsGvpoe/1fQ
UQYyh9hIFhNIor/oeqsbe7VnGx5J6ksanhHBjQq8GCB0063U8V6HDLiIS/xR0SL259MO83CBoAsjcsci

n/vL3XsBgngONFAvI22GsJIF7vOzY5VBuuyK8E/2tFoxn2DIN6W+td+rMjkkzQp/87VGfjNC+oi/ywZT

ElisuBfoQDI5FLsoi7MS5Xw1LyQ3b+w1W4or/7o3ew
ega+MiWP4flEmluQKOiR2hhgj9j8+RD3yjNHhacTCva7/KHKromn96Mu3Noof79DrP2/9SP+VLCaTcMv

S7msN4cFslQhMIP1bbmM2Pn87L9cFuR7lrKgVhvLQiLBHv0mUwWIz5aons9iJS7f+D9It3TkuHnmuur2

ttsAxht7844+/KfczV8xtosYz6m8FaBNiij7fMuLL3
84I/urYqb4kgMzfmx19jO0cAmxERNspH/jgIo7mlD1qdnAarJ7NpRFk66SIU4Uf53Yd+YzLg057txeLm

n8obHhmiiiEysq14Lzf4Umh942h4v364R/1d6Q0F8OxepEmjnJPwbuM+WecQA+QmkWdBW1j7GtVAGmdl

du4/ZqAvm0TlYIQNfE7kxw3HO7JX14Eq1v8xQST6Gx
aMpj6XhhHFr3nW7+Ob0mWnCxgm40r491+n3sp9pXPI3YfCcWQ8iPCQZ8GfnitQSn1fqaNa2mvqzBqdMG

pBD0w9bK+gjJF0awVS9TYxqjQY62wGBkwRAFI1lE1lAfbzpcbZK4VEWMiMw5/Yvn5AB7ou9uV+q/a1hM

LAjjfHI/l/lz5IaS3gBrGo3emkRwjd7LesaT+tK/3Z
cYs6eRlhGm08Fds/xmSKD+pvjiACKichpDI0mohLJtT9ns6dS5C3S6hjFyA6ZLSFzLRILurO2TtYKJL9

1Q47KWQSftv3f/qQTsPdd4mZhE7jjCa7sCntDtoeyvo5Rt+k3YXP+fbqwUk+JK9pN9raM6g8kg/J0uil

p7LYE38Wn7JnD+NCJYy0qxg54HgrQhCnzuHN8gzxV2
wROe/4P4Xjsyo1snjp772nGBkjAB6MnM0jF0QzvTKDS8uEUnhu6g+E2SkFhQdn2oeeqLLZe86KCqXXKK

d/NmM/I+ksa6JX0d1zyu/J/Li9tvjcx/abKxtQmwNEB4yvx5ymi5IpJHZZ2Rh0RN5KZk1r3YAfCMdks9

LPm1Op75lSHfPheOICtiCvUtL/v831mx1zuoOL/8lT
7QsQSg+3Lwn9ckBbMT464Ve9T0hxxa2+HJR5JKqTL1Ivs4QZiXAPN46og+Bv6fdR99UkQKqs1Q6f99U4

LEqhgLWHwwBwnzS0FdCLRLqeqm6Q2GFomkL7Kn10t+vMatXnyrq5pY09GV30Xf4bW6gj86bWp+jNfLRk

BbqdWeMQ4KV5LC8MWfPggz1eVE0UAzezGesciA9v3F
7t9U/Zt+WblD1t+DJsf0oJZDeVf87zFTvNlkg5zvSL/7T6A6s46mQqrS49qryQINs92OoFRzG4bPWyxp

+1YJHZ337w6svSlSreT9KPnQFI1yxCog+hYkDJHnUem7iEHrT53zcRQG1lInqkDlAe2/sCBLJHHIe/Ly

BKmke/YmEHlaw1tmOKmMMC6xXjTdMYw767LszlWVwU
b00pcVJTxFPKcEs8t5aRToNJe5FIQ52UfdmTEaIC3+r/JIQrmTYVJSdYevPE2vo6hcObLiPmjqkVnpEu

RpDlS1u3Uuk12X5ceRodayzF4FEwHtBYmb7GnIun415XfleSYASA1EWcjNIinjGFOHUcu7ibfNl6U5OJ

twzo9+Hay/+4vdPoB1FE1TjBCb47Pm5AwoI+vMMtT8
kACT/muDtdzcWO0N1fT0zR73W4mv98naUm176dFIPMB2LfQjVflLqArUYZEEg7Fz6EhDHBqm5b0GBlnO

2kB2aQnLtU6LPQ8kNFRdgJrs2XuLiVTehzto3bRuRjShFS0EXU0O4DYjzFnzkIWpQrULF1Nd/hkApnbM

8pLHZkeDvvqxy30hLaJEmaNkAmuqri4fhDIU6EkB2+
9esV1xT91GJ36s6DbfD4SXBmL371Kd+PzuZDPAgzbe+X0/RUVvFfbyVpcj/GV/gnWpN5qHTBp01AoecZ

W2hjREdrBgArP0R1axENh1Znn327SvMs6bGG8w7IjruTP1RFFeYN11NL77ZZsII2mpTKa0NfkRVz9j0c

TtkBcjIgfOGbBo6Vg4HIUsRn2zazUccVJiv4tDUSLH
CcQH6X9i539nlXXDiBR2QZZ6gG7SgsgzK8SbuWNulqW+8c/1WQlA26rv8l0Nvk6m7xoFpxn22/08yyah

Wkign+kRhtWF4LaFjopeag/yG9cTM6FwW4uZQkqsioCbclXbQJuKDUiiv56AqbVtauuq5jaSrU6io8k4

IhVGo9+JRW13h6A0E1KSr/MENG/dTVv+8vEjZw0DgG5y
sSw90jERRAhSp9D6gN0mO//155iEF35FLPWVedoRQaEYFQbGyJavxqzJwfe/IIOK1FqUTG6Xau8rJQDu

LY0brPsuQ2cbCei9SEzf9a+G1BWblJ4h2vWE9Ac0zM8jcbcXdx5zr3pvtubrUEthgWmn1G0QbIQSUGQJ

ruFYARa/z6d4lNmbfHu382rSoeXE4v7e/JZfzc+H+h
SzFaPEzTupiBgqjAuGDmpjUHZ4UGRS4b6eJWQ6Z7iLOGl98gXbuAMO4VuOrSk7h12OjkJ3fL95fq84dK

P/2dzpE1fIe7rFozuWqtsNVZd5+zcTb1odEAHlInz8gESAlVURa02AS37H1DkXkGfrh4RKUvI2DSzHDl

srpzGpGDDQd+f2oIufOgx0ZHADi2GlAvSHiHVK+6mu
ySMN8VIzn6Q764D+jF+lFnVEsZlF0Nmquu9ZHiJj5+ng7jdrh4RnZdVvQ8gjX+KjfIR4x/1K1gj5uL1g

pMuN5g2XE/v17EV8ZhRExBTb9GcAfp6Dvsfa3yIp98wjZf7zPQEK3kkz0l8tqrdN+wILWxhWlv1gsb71

tg7x8Jsd2VVJ5unxrxxAvQ4Onv3thr6JJ2zX0zrQUf
zyrHeutn8pE9cCm7ngk+D1Z2ofeBSRCQ48UPmeWX1I30VilYkez7sVnDd1PsVPvcRzdbp7s0czs6xS0b

wZ4o24c2PR1KSMS28YfuZG844SBjozFC+7yIKJyi/uo4JfjvKg2DddsT0QOQfXHyMFIq8wy3X2bIO7OF

N8J7DKP5BvnmN5xLQ41w+EQexrH3cdyflZWCpSj16H
6MAvEZhI+pxiwot5VUj+B2TohaioWV0VsxWvGr69FI3cpPOTM0jwLJgQn8RCQ5JJ23HOAh2rdgKVEEKh

p4Yvp9O5w5xkukkjGZZevmO6u2rTXp2ZB/F5o4i3+ZMB435f0wtfPf7+WcQA6/CDYFI7/FG26ov4Usq2

wWZ0y9i8g0xOBm3qutAvMQ1AuolaIgDWxZunrv+C5O
wLVohEECXakI+74Mg0BpGl4v5sNoo2/eM2outu+v1xUobYnyZcdaCuSgD7f4CMtnV7bX+5HmZ6UJmug1

lE0NYDpO9CHySyn7ktPaIzbbe1pAu8mQ1UxAosdqZDdwt6bM6fwpD1vvX9aFUecfvNp9aDD7kXscfAuO

/j/t2rlCv4UHfIK33gOoPykctugXS6N9Tko+iNBZYm
7XTgVjQPPjWnB6evOegkr2kYafoFjX7kFTude1usoHzgPChtbj+/ms5AvcK/Gj3Am8isEEtg3ckdaAq7

0A9qqw6IrB5pd2VJaBkYNl6oIB+SdSR6INoVNWNnaOBNV/ogqhuQNzybx7YzdjOmQ8F/bzZ9Y/zBQXZ4

9SFXzcaBdNqNG7ukAyARMuVL8cdS2PFT0+8Fjr67A8
GH7I0KeX6W0/KcNZz01yB0wVlxwlvLszMo1dpbBIpiMtMXCNk25C4BWhDUyJTKzNMO9pdERERdqcOU2Y

LRpNRzJsktpZ+RDHByOTq87DenzM5cy+AMimVl5WNytrP3vcI3UzNci9Z1nDg6Zz0i6fL++Mlcdbasla

UXwSE6OoHdwdS5QY6oKR0C56aCZ5oix4pfiz6WKJ7p
u9y7ea69PZiQnLIruH1Mwc/HxyZwTuv9UTeCG3vvxR95ThYm/6ky5ul/QS5I0rCZ21bdLQ+hMSfasy15

mPFNW/MZlD9c96d6e3jU44KZAzHSJxbc5TKga0CYJ18rlBXseUpwfO1JeFC6JJ/x2OEEoyUraKdH4H4J

mDeYJmuPj7sZCTykECtqLJsmjw3k7SJH9SYHPwOL9n
bPlEU/6BgganGolv1SKe4ElgYRMX0vth7s0Y0Bv9QjDc+qPTkJamRtsD0PtYDt2387VMWixBJwKj/8gI

r15Q35gXTrbcfdevwa4DTcuqt/4D64o6/BtlOgUDuRZodudlwgG3gwPr6B1dX9X/PUuST52lbCRTXbtq

CBNZDeeWuAD0S6g0LK0Yy8nVc1ocP2EimnOCFYke4E
fNNRz/o1xxlreEY0k4SVNtYV1RziVuz2AfYI7pK3/4l8FT3+Wtduo1xWuRk9Xtb+s1l9SQk+qJz9ItJa

bIIPCyiB9QJQTreiTupUc9af6E4uwFGVKxFeGnNWGBXG5Ru2KAODtesQoN7avIBA8+It0it4/vmXXwVN

5XhK7YCl+56BaKPstDqNf9pieX9OlkIfBATnOxTHg9
uV7EI69cWnMRrs+M5u8PDPHxtul5pQS5qWNEZT6OnPfv2V3c6WgpaeiKPDhvn6uzqVymJmR6Jzz/5+Yd

Dct2CMYq3sMwaWxHPGXxpbJpCvkDjSN0zRj5nbKvf5RnWUdgevTrRnBGIgr4s6W4wflQCAaUWkkgneq0

1wjjDv6jcjx5961ff8PAKF6c8Vm4XJwkxR+1VMtIRM
mw6Ca1OZ6S9ydkZB+TVobLfu5MdUXQD7EHt0t4WtOiN8CNACWGk7kL482lAzcR7wvkwROSbGa4KVZHGU

icKuqqLzhwF1T4ovFxKP8uz1rNcwIaBSXVObWOyI5cs/ZZAObJ0OolxnDDLV7nUCusZK1Kkn7l+1YvPB

mQN53bu3xSh3/ue4SdFZe/SYuh8nXEKFTKXSWnb3UQ
9QHllIEhps6nuWb9vnwR01JrivlHfoUXy/vT2sWXxngOymBbV6aZPN0RhmYOa84WPXzPkzo8+/hBVrEZ

MCwZqE08/k/autdi+TYjvhif3pJKucIs9d7i7wqlup2BiOS1tPhZWVumMLU8l5GCyZYlaXLGaVJ4al47

pBcca+r2Yi69S0F2vvLUzqU2cCzrocHFRukiVUtBtA
f8WgDVFEO0ej9uV5y/zvIvHqjLnpNurw//3t4m3EgyFLgxarUr/dQpLrm5a8Gf6qUsE557R8ILXpo/EI

xqEyrqnJbCwey3AJAtYNG8pGBAzJM//8otBTrSHekRC4vBfNDwKqHnmrOSTT+db+FI16l+6kli2GbIsC

Z0aLLnd1j+K7q9Ll0DxghZK4KLKe6Mjmz0Z35qH1aX
GVs/TqaxGOqkIGOxg2/uulsk33BOElWi0+yoyslZrCdGrtZi9XWejErFC3Eiesk5cqPcVVIA0dTnAd8N

5yw/LpfFgavT1IqNkOTcgBRrSkDm9oMCWa13DDcuVHcFbSq6cfue636mhYNaLMOYrDgjoHjuJ04g1Mxp

WogiozMLzAQFa+15OBWd+iQdxpdZpg8ckIKMqWhPUs
fFH1i4kQyn5hF530qsZ8zVUsf56aL50pU1qOEm0HDqVxS31MQAJMip9L1QC2ZIATSLuSREO6N+VqulRV

7kjFclbZAhiLOOzZ67fbQth1DajOa5Gv4uj9Eu/lvjBAjJSv/XlWy/wrrwvoT+88BUbUTzaUS+SZgMA5

PPf73XUi3e71Dwo8EXAWqeRxc/kaN1YBLJlnuExRjh
8C782pH3iLZtzlhgNsyTaLI5DuPV2y37enu6Rll942ASxCzpgLvp2Ynb5GFNnvvh+8YiiwvjVT7ahNGd

JwxJKdnipmw2ElksRyfVfKLKsR7N+Mtasi7luN6HT0ZM1fbmdX2OA4UYqCLR5yd5/Pe5KfH3wFbJmH2N

+SyI3PE5eJpyyO8FI7xsAB8j9gmqZBpDsTknKdS/Me
jRIxCU70nw3bWaHKutHrwEm1zcEUvyl5/sSjYN681LRQlRrih6DWKaTMP0T8MNv609MGOnHAy3coslZw

cZsNrMG+JuvM5TDhDyCm9ncxg/ZI1Y+kqcwYAmOklLPCdnLybx22ma552H9orQ9M1S12oCNiwExQXRoS

0lSx6lTNOu/Hc9s40hlXGyB2rdBmxLWTYjE2M5wh8m
cFzLJY9SpIWNzW/5RmbzAEZaFyn6gwgvkciOl0WP5cECEP970dNIdT+tKvAhj6NqQFHB8K7U+il5GPFO

xxY507vrOvfLDw4439KUW0K8qP90innV93HXvhJj6vHbyJ+y1zOVzxrB5RPIzH9cTuH1d5dlzyVvig9E

Yh3BBhrCXYO9imUQ9fvIioJqG8ZGBSXsqbVln4cMyU
20MXxC/uV91YyVlmOlnoK1vr9j2JwlDTljhD556KXkxOzp4muCB5kxoDxkvB3m4Hjd7lDwA3p2Foiv/K

2/2TbDSFJI/L9jMIW2zEBa5F6xJqnFRntviw0G/93foNDT6lfBf2SL1cwy08JKRY5tl9nQ0i7lehMDgO

1lgECXjX+aaxRe+AGoVb97jli8wxliNwm/cy9WKGoi
XdU4bmL3VNWxT/vQOfX5d/CuSlE9fmLjfn+RpDpW5NwhDIb4N4Qd3FlH/yAxp+htw+w4aZv6FS0Ul5U6

Ok+fzupr93lVvp6mSwm5OURL9e+aiLVOi0A26sGbFFTCIKhbw5EEtDfWEwD4LgGI2pioqeNcV16EBA84

B47Z+mHfaW/E+ZJjrGU9sOttoAtMgs9SZr5UP9r4/X
Sr1G16nRSn1Cuc30LKlaAg7mhZU5cJqt6SEng/s9HQQm5wqayghujvxRPuHgI6QB86i6TkK1I1IjvMMW

17W2CEbH2nI7N9IIL7EcSUn87ntDqhxSznixM+anT5rMJshKIqm0xxbbMw8VYpbx+C04nMpq06d+lrr9

ZDVMOVa4PUIrYnYBtrmJMmtVIV77LOSZY9jNx83woM
k2uL0lJfiS0UGaKj1rF7ON6CT4NBYs2XZNGsHiMsokEVjzbTfev98/UC72w0ow39tVOoDRZjJRmd/5hb

KuFpT5xRDsKhwWJuDxNBUgL0pfsIvUJckqw/TnbLl0ljwqJT7ZylOsEi51ZEl1D5VsMH2zqVQADCtfnp

vGsFjybTj04+JvR/Ssxca241xVR58PXreCfq0b+Ni0
I2CIJtU0B7k3s8RABqwIKJtYkQHnnnwOQXSr5R12okhI5k78mgpcaMfasfF8FPeyKxqs4sbDVMRsPn4x

TuEW4Rrhx76dMUfGJu3Rl0CKiDrayS44WsVX+kSq22AkUoI597MEyuUIXVzJ7/eywBPQ0wgFWI5SU6xb

pJvk2Lgcmnji0YSoCNhVluhoTK6o76X8R1hGusblyH
T/X98Ox8VyKrtN6VmdKQMCRPUjtt+L4lfHKQug+wgOyaeVSeZHHm9KwTd3OEWkBUpOWH1M8eJ3C5qqfw

wz6hskH/fDIfat5Ugbo901yVcU/FgT2SBSPjTgRj38IcClva1ce5PkBlKmP2eFveUZP49ONlzGkYimJc

/W7T1taCOIlf8b1otqNzTb40V4ZxMEHeELRYP4DykE
l7/0JnWVBXtvxMrcdZtx0CmEhRvrWNFPjPNt2qUKzB/R1Jfegz1LnGR9QVGxj3bPx3fA4tlxdr24j60l

eyPUB9zW2wxnrFdSCmzlomFKwsSLAPcdosOvpAIDU8TYr4P1tD70sosM8ou0Y3/5dK0r3pFUrE4M3Fdg

LcNR+FyIHuhQdeyqmW6T7dshic9/eX3/LDCFSV9EV3
K4STl3ll1ZYaECua0trc+/c4QR2+7vJJGfgbr4e62hmxi3TI65z+VNSo4oxa2BLpAByja4NfxfH/zKCI

z/IbmHMqpSDik9q0aWtFSQDIPX/hWPQuQS5w34Gg7Hv8rj9qNsaDu9Jy037hiP3vNAZ79dDwBcvh0l/B

5ylSzV56ZtkOgg2ju6HIWWKkWfg6WiVg0znL0eeNt/
cXs9U/7joWEDmweDrOf8SAfkfUAJ8Hu3XUaxOP1MSAh5FNtoftcQ//TIuO+BhmaIMjplXHmyDYKqoVrT

tp59UA69L5mkp657s4nGja2s5zxNxVia4TAnK44hZrXe4X6nqx9w1BeXn7o1XAlhsN66z/9tOToTqShY

AJw8TPO2eeI0caDsLGsa/0iLk9n0SgJ94ol/6eIkk0
ln8dGHntqA6qOZGTLCdhEnalwtnOal9XP1B2AU7tbQK8JHX1o7cYQi190HiP91O7naHgaRz9dR9ALGNp

hNjFHMZkIgC2ckhxyZrvCvVkj0IHoglE2YgFPCU/SyfDirZNrz4wKqBlVU5ZhUe7mi9mpsX6NG7srXmb

45cMnjoKP0y1NtBxzQVhueKB/wJnjhGly7gIiobLv5
YLrGqfKIBNNavtYjA1uk1KVwGjeduLyKvi70GXj9M4Jhmt9X+iFQR8BSOz/NUOWu04WnXllHJ7JP/lnn

Ko4w7xbSNl/IWnmkqBP6Q/FeEDUiz4TqyAuzfg6Md7GKljmIQvxgiOTv6414YNTKQ7gZhnl5Ffkdi+uB

DbBu062r93Owbyrom6AGhlFTlV+5AYeL6/rTTE9XT3
5edWhgJZ4mn+4ij6jYL6vOqOs2d11wG2PjbA5VPM7cwnFbPWW9GIv12ia3Xx4Tzr/r5l706zVyeoOnKo

XeK4M8pseerbcjOr5UGnTQBRxYv09RTd6hH4bbCDaaAXyU/wGDo2tp/0N4vpshkqMn+7qg0YckUCWMQh

jQpzIiAgBGMX2Lh8foIq/IZZTx0MDns8HtZmpKajth
0LGvBt2Sdam3CPYFGnkx+V+wBt9K8ihnrmbHP2slTJo+0/1M//DmCYV8sQF2Qr4/QCbVlYJ7AE7MmBCe

HMJzO6kOfttrBlZ2aXOudkyY1ihnVrM0R9td581jq4aSPEDxyy2tfDanc/9fGkTWPsjQSxpFcELa7ZQz

zbe7BKeqhMGXKNochoOLc2b85qTlFff1Sl9nkX2Y3T
we4Q5DnTeSsoPcXsfucT1eJivkwhH95GtX5KwSj60io7ar9Q+/Z7O2hfCGkbRkEsO5O2rwqGylQPaegb

VhbNXc0o7wzHRY/RCJOqGKhaUuomyjYZipyw2HrLux/12ZdHRfF09sDK26vtdN4dly9mcfa4tf9cMzE4

X92UOkqOSZE0RzN1KYkbvZ1fXqVHSJaeyRH0GlBEHs
up38v6D3wjrL7CcO0MTippzfVhKD9rbs3h//Isz88X53/Vu3KkwVyclgKnK8FIkLc3xN+y/+6dQ0Yx0R

SHZH5jSMi1bOLU/1tQJDr9snGD4aF6HRyWnweLihOftZOAd0Y0VTv9mwZVOnnuNhOMk/ifk6XbtDrfh7

XM+3eD1L90In3+fJO29ayYtgYT5VDsqKzcYZf69oFI
c5eYSUU6fkNYv1+SpgMp9Bo4DiW2j6apjti8ZdU59sKV+EXNO0+tkreftU0lGo3/wnbl/I/9X0XQYrk3

NHQfwFZnsdkpj7+KqOtIsa6c3GOvaIi6L/iJJ1QciApDbk+awbqnHwMUdhlddYJpLohHsOiEqbUJx6h7

oSU73SiMGFQh/QhuJrM2B0NLyMiwJZVjpqOwG43n8h
OPR9fi4KLH6d9kT7cal3jcdHEAB6qZCZTSUn6chA8qwmGpDxuUnC0Gq+TTncRhch6D15N9e94fZcp8uf

nxaJOTuiTDrqFyTObasjXxVnpUNBn7vzPOw65mASdIaxbz9jQkwn1wn0wld6s00QWsY7ugqdb/Bm0nki

fkQOuOsVSds86Q5yN353FBT0uXDJdDuZXjqzcV/PDU
ouphj6ktA2KmkRN2bq+EAYrxN9bnBhMV/RnhU/7S3BhrnkUhsCG+f+96OnCkhaKSbaPG6arCXk0x0RNB

pRH2uNLDVL7LoARwP/CcdXd00thodDIa/kxBDOqYahWVb+Scott+FsPiI8E/stT9BYo3RAWoPeKOXzwdk7

SjdbVy7PhxJ5PDgx3LHDfe7dC9JWgPWOjJolddLAKe
4MwIal3+qfTy/4IcR88yrbauRZ0GpQsNnJETgo7r/h+hfqmx+0eoyr/xvRUMfpRPpsCRePvDjvd2r5Pw

YXd/vz9Eui4wIpEyyH670+okBbIVn7DgOpPJ8474plyvDtwwgrAREb01newy4xh9UkswMLmFCltKHWz2

p/z/pxkA2t/1TwSO9dwWeOXsvNlNW+/zdgSJTZnkCH
fsn/fQzfNLY/c+dPo9ZvfGk1R8IDzk2oGFqX+JPQarv1885Q+1lW2gXyF3On2dfug6/l8cjwVztym7i/

HLuc/de42zonX7Vk77VXQqX5+M2p5SAuDVf4UB688Z6VTr8yRpl3GPsEmJOWVdVBRTD2oP4fa7X1rxi+

2Q48UCLc37vG7IS9xGCVw6F7lRAeHFR+57RWE6+0j5
BSVptMA1NTjiVK3ka/Mve98n+qJeKw/rW5bGWOF5J1jGU2xfOp+hZC5HVBmR2URxaLbQ+aaWjPugD/5W

Xzc0zhwn72pXIXUofW3rS3pWVqdusm+w9p+n7hdv7RO+IbM7+nbddm/e5IH4Z5h8REGs/+SiywT5R1kN

JGnJjktpiVpoE68JONDRjHtryBFXXVccxF6YQpti98
MlBohUxrPc4jobehBC995AAMX571f6RPRo9yhz4dggHlvUXyso11SqMdLnIUyhojH3Zl2ZudrZOqxF4S

dcbtIgsxxrfSH8nuHG2QjZgF7TziD5recsHmfo+OlNrfzbKNzqUgh4F5Obd1DtNf2dTRCGRRtbtTe9lr

fZl4ebtyopBJUqKeJmuTxWAj2NwBtazJ8BoIcOFPO2
Dyr2ON1cBApg36zIIUp/aTH+2o95CVlcq9aT6F03kf0uFCUOWCUivg/Oye0lyw8ztCmkA2oPDwxztUgp

iMbZFr4uMuj5lUSI79Kn7G+BtoMH6s4FRRREfWg5pO5ZK55lHCBWzfNJThTkKUdYpvlTlnDookW5Gq8C

iwNs+AhHtdDfAcK/HDSIv5W2eKqKNLbu3TQeNh5xYx
Viet+YLuTWLrdxfL1KqlLg/eKJsuw2dX+GpYZ9BgXT/XDVCljWKc5LpC+a2JR/69N7x1uEYHHY98mHQx9e

h+DFWC3LdOYKB71AxO/3VSJ6vm18C/gckj8G20mSrkWM65NIsiTz4uYa3m2FMS9/OXo8dDUnMZ8NELax

PrDp63M1tOaAKUNGtl6Xufn4tcMnjxVtwnt3cEZjQ1
jkptin8q695PXQ07e5rJfxIWnOg+T8Zh4Zs4n46snX1gYTb1vT/pYhPtpEdaJw38IsiqCi6AW1lZ5Xc0

/w5bFVaZC0WwfR3vp/9iNM4jRRup7IpXMsyUpb3S5Ij54KOb5+7udzOUJvtqJVEobOdD8diCAoYlMDqZ

dbPOBlNnUVl+Dam1kXrdBgUg2rTyBWAraFm71bSqhU
/2u2Z/f3/jvqo9QP0kY2qmNklOKVD3PBTTzpDut0ww2huNbIq8N8TYh2Dz5OOt3TvyrcaLli++nYempO

egWnT+c6n3+4bDPLQ7UVH21SC2+0Id5w0lVxCUTWoSAY6jc9vmh/ulNyiTJhafQa+ojsuDj/j35TKxBc

6JCUQWZvf6SVxEF/aWMSrYFzuT1L42yllPUzDYr5wx
84YFxEx84xpCCkZaTp1++eDRE4v1b4mUJXvxEAq5t6/hXSQWOhv6IVMhHB5Byp2xSS39/ZAQIWt0

lidKzqpZPyXotWn+PGWwtfwBgOAKa+P1w494FMjXKJtwKaFMRlgiEk7dxtj0HpSapmiU9X6ynBTi8piw

vB/9dSCBfnHGYnJ0fYWbku5YrGpxOOrUJtJG7+qeOA
DqSJFdB/p2jjqcodCz4k7bWONm51PFjFXChF72X9FnygbuOAhezpLnytilBf98LJgXX4iAzAIrOc9Rbc

t4T6W34+XR90Ng2Ab31ANogvzE4MaJ1XOupf+Zz+Bl5BcmxjyZx2N45d5uv3uRsEFSpFlg096T/Hmfqh

RxA5VkMNBQ/D++cNpBU3UhyYe8H3Pqdp6Hn849QbvJ
GCEacXqvMCdG4NqJVF81NmB2YfHKfEEDSil6qRyj8w+GFw1bllkqLlSOUh5j/ooG9JQ3aBkp/qRdSt2l

pXHTiWOZ4onvwyBwxkMSXgzXCXeh740j61fwz6OtghrqM8PnD+bT80eB6x7OjlWg4tvylOSPqfgu+iTS

v/oQpa/rVIT0uMX1aNs9pR5Q6vhC7pZcXg5OfALlWT
5RrdqinZvDn89R3asVChUlB32UXcZHyglFx86l3wufoOfdqU4xy+eLVcKcKFOU3d3Aft+O6wAkUR3m59

gcVJ3n07T8rMSRd/6uGzav92Ju41U5xaJNvCtkIDNjOeL0YMpwuACzh2ZLuMldmWA3J0cnHRZbeI2+dA

+tANJUlelV+kcnkgxC6wKZqdH4SRcBeyM5Yh3W+PO6
lL913Ab3Uz3wyi1U8peDM/0K1D57YtLG06IeR18k9X0G/gFzxBehGCfXGUPTcABlqmlQ24PfuyWEjFFB

/HaoQDPkIjg+I9k54KKwnVYF1Lt/8O+6nobyZ9oCzqvb4R1SpRGZTIaRagFFxZ5X5tHEaHyWyHzsE4MU

7CFwgzYKyj0JuWm26ubgfot38auVyMHF3yHyRyNGCx
+TpIDJJrbi/S75HPeG2tJ+najdLvJ4NYaoos473KXIfcMkLsJT5icVD94rxhr7HYRXj7WBpsVOwc+Mary

G2Q1o1KMino0ZGJbbPUtgFzR0nHwKmiLC8kjHCQycXmWuL0aakIfWThPaUyOWI6paiTFtm7F+5L1X94g

NIIM2hPlodhUr6dFdTilKt+9IrBqrA1JIBn/rvezCR
5phla08jnfVIDfBKt8VV8VTEsIjyoOz8CKqy+V/AhSOMyONM90kHTSwpts1uzkIDMvCMYKI7JIz84NWN

gmbtTGXHcreQ4lJYch8DIejK4wGrWCcg51ylJa3v4q9qwO22Sj8ZV83Xim6f/H/a4yYUx8vLUURB9MAT

JbP4dZx6UFZbQEQuXUOGD+ln3GshO6iwULtn+xq3sN
D97R2rL1tS6vTQK6kmdBzihYqZ9lFS4lvQ2M4OvMHedCt9A4REk5yP+v0fZyoNE3WjbogdKs8U4OTJoY

9PPEG38JAU+vxiccasozT29UpLB/eX3JRqC8DH4C9sMI5ON7fyTHVFe7WOi7HUmRD5+GcKzibC7gkvxn

iwHpl9FNKzm6qT+afh8sZwBZWdJ2dQmPqrHI8Ja33O
wWtTd06GxPACHKk45gp20OfDvYzSEy4FvOa3V3WKZy8ODtpnGJMqyr0CzcrVG6t+20aa9fuddMX4HrTX

i1o+U/HAu98tFatNZb2clZ0dsc2i9fW/u6no1s9mxErP17i6UwEb1362m2WtIRagC05vO38bzekTHGod

Bbr8UXY06C/M3wvWowc2HO2tQJcdvkgWkoenNqvFtm
m93UWHtOymswwTmDrgupjeP5KOFTwU9XhdAc5nlt12eCY9RWMswjxwCWIP8Rnj7A9FH8dkzL8fTKlu+1

tWpch8/g8XqJPvdBACd5w2HEHw2idceKYgl3UOjgYI8SOZGQUWA583zOl/0zoxgjkk/7kLTFIfrALiD0

J0+QsrjEWKW1sQazLq0ros+1o45kLSbkn28L3FcJjo
1QcSe5/QkOQqUGX0S9vYuO8P+xuFOpfHEGp4rjkKO3A/k+UsjPJw/ryjLb/Xd5uVIMJqU0qlOkja+kge

+2j2SJp8lgwqf7W27FT7LD7CLkZjKj33OdP8ZvnS1CDvniNxuCfG1WYvIUiZQwK19zhyEUxtnHePcBFb

ZlmCjLxoHPttgccz2xUcR7xPfWZzkcUZkiOgqJ0Afr
5ke6DDTmEDbYRoHJBxrVjDRcGPyRulhkZ0z/osAr2x0gw/NcpaLshkvAfEB/GUmIJI5YF0iTP4HMwAHm

+6uEY3fSodDrZC2Ccy02lvimXpKbdwQz8s52laGTWVfW08dM2blkIsO58p3cFsZYXMs0wLt5Czo2EhZW

ESX8BzrLHxR/nPA4QosD1Gq0WWYXj7pzPZhIdZNw7F
Nwem/cyYvpgmikgCApRPfL6rU+6hOEJ2RGpAwj/DfKK5HUyLf5sDixLdNQ+Ou2OAw+7n3xOzEMPZ0x90

qhqtx2rPlm2UTQYW7MUhVAtIpBaX9SozEWXWZukw+VmTEA8bJwCSWapet74ycE3dk1zIohzBXlwSu9ek

xNrlD47NXqtGkj4PjNWDqsDO216Uvors0e0JIR1mR1
Do4tDRHn+zffIqAk95FRCkzWmvKvYeBd2fJ3BQoWlz0UEyMyXjVP8rhkYypB3fZQu7rlAI74FEDM4CYv

00kzGRjvgf86PBxEIoDp0wh9/rPYbnbgtLE1afglaG9PrkaGPJZ0+H28lAr2eS18/kF3klFwkFKqID/R

2X2lC+Cg1cv2qegqdh/Dl1oE+Om54Ff04R0tGxzCp2
HGocGjHp+jyIfSRoCkpg3cZnEJhyDTevJ6N5ms9J0DmeEowZ+rJWvjUhVfq9GtwZCBLGXnN5cp40x67R

ZeVaVgSuX0HYpo6RWNZPUudwyQ8GqP+dKKcn1v6781uMER/1YELP6gG+qXiYMh4vRst/y+1R+glojCv9

0uJsFbe8qvMwG05xJLKqxe1zKP34YHjWsr+E/9MY3r
uJMgg6riuW0RkMw/7Tu6lZEoTWN/nMJylvpNLGQQGyaIg14jxInmXiLIs5U22LWsVwqEq7WlLL4E4gXV

dAc+d+MDUbH2NwntHqZoGkGtSJwO338/P5KPDiqDwu5NePM2ZBlwXHiZxpPnchJHy0xX2FaL9qgSHTc4

6MT/l4ITEIIvbjEfTeX5qxEXMYjIXCqzCdbGBRvP6X
LqQdcqI/r0IlnLnNq1NIh9PKhn1WM3NC6zqHpWBTXrRA3VZnPgUSBiijnX1bmFO/DVvwWhyi7VU9IXuX

8zqiU8Ed87/d5hSE07mxm3aOuOMJTTcDC6TzPWN2yEkX0+L2kPFldREs+5SjM03wOOet78SiLcrNDkuI

0AO09BrIt6mQ2goGzHSdB3pKcAxYv6/H0upcJEzjNZ
cT4k3Mi9BsiJ4Ou8/8HT1CXLPKgAmZtTc2lyEHGvXT/+A798nRisFx3zs5ghL1CSyetXw8xTHzyJbdUC

l9Sf6jkQURRKByqs7Qf83TlNAf+EFFquR0b/sVhYhAtUb8GgXLqPE6MApogVz2oJ1DyKQscuGmM6jRBr

2Dl5aj17p2PTLygsM5V1Jdcp1n39qXNF4IAFqm640y
hv+ierw3KdTizwFcwJ3/X4Oy+ZBgW5H+aUXetm9n1AOqye1I4jSn23VsSM9hwW2OAVPcrgN2pHgJYoSD

MRpqeVjvia+ITH0385ASden4oixVKInxPPb37HvuovNBq2TN8isrv0WeRTMzdO3dKvx8vf/FghoR1KuH

laUmg+YbYG/aPIds8YY87zADGHw/lq6z0c4atXEFHV
Ta6sK07AMyCcrV+hzGVbpuH9srOg38azDAKo+m+yTmkdaR0BIilfRsA6gBlwlJUjGAZVjBfF3Ft0/PDD

Lh5a2us4xwDydvzU4wgm+f7xg/GWHv/CdPa+P857fNoTtpDHp5+Fu9pYoVD8GYfY8j4TCYr0Hth0RhJk

FFaMHIt/r36xB9wRfXoZ+2O0GzO5pbJ8NrBqKd7ZDb
nZDKWdY+re8SycLb9bILpIa/Og+Sp1SkzS3FcvbAZjGS8BEVyrSBkEtXJ41RWn8sy/+oMa2Ker58PSze

GHM30YKnSQA/ePyEevat19VGnHnVjsIMmYw2O4sxlVoIOnZd7yOky+153NHzpUt5M3Gu+YyET9g2WB2W

oI4YXMqSbfOWzU2u01/itUC1q7KWS2zdEyq40zxetn
wAtWLi/xbiy7ZXbq0cwIFYqYV6leX27VA51F3Zu9MHLhGa7gZdQmYLKUM2ujNJ+Xfs1kAfdLmDVLiBi8

Vc7gTAdTtcHwLR7AlgY3cL3HtazsVHhMf6UA1906azJ4Po3VPYgZjxGleLqmMiAt2ojveMYV7vjI+OPP

15fggkQDi/YWBpViC/dLSgljzlsqXrGPOOfgh020Sq
QErEeeKyZbcNlrLEoRNMcVUbOE4VjHOvz7MEe/I/eLf5AQWICA3oOkUrHbbXAmzpYHmqzxmDjKjyqZ8v

tREo5RIR+7v29C7Y+Xq4Bg7LIJqCugNAkLP81hWShOxkGCmKdYalFvFZXst59yvtjZzXZVFO+s7f4un+

b3ovZrhLAM3sSIowcnD/FeZ+ylJml9vttEgnqrUivP
VUvpQuCfpO33GB7EkiHlURKP3EPW97og6RzRO1/MT6alnr2hW7ZHzc3TSUaWYTwIx19bjwbAV3n5PRME

KP2xoq/9mXqeIk+nemMZY42l0Jtn02oV6+OFW+XIoXcfLhXJuyphHp/xLpc5oK+m0K2/eLYg4wJT1SMo

SSrBI3aP4li+0Ner4sQ4hZlvwOZqPSdXWnswETheTV
cxa7lXp0V7UsYrkg1j5BGv4qXOIBiQfxWRlCxuOKUYUXQ3By+iVxfX4XB4rHqaVkYGSxUaQtZ/OS9ToO

IHO1UvT0IqPR9nS3Wo9MJV60xC//na9wU8KZPlB3XK+5SUkJjpRw9YhDB6WCH1eDLd4En1r21Xw73E4W

KR+FXD6UBODTau0Fez6gNJdwrbDKLyKOfu7ehSIJZJ
IHTh4W/z76aqWoqgKr5MbdfoBVLk5BWgJ4eNwy6JhGiqYoLl126IavRV43bf/eElhQnnoaMxf3rAVW0/

4EttxO5HSdKd8fvOjmZf5Qfcu5dCIsjI1FvH+4+Jilgvmrx3kVChH3ks8TnXHkGTuI1LasugrdP7Q0YK

6ety9MvFxvcouhJSFv5WG3h4+/hCcXOi0PZ97LkJbc
nabLCa/kgwjZXor5BH5R6wcHqJiLL1vArHTX1xWvmRtPBQl12e+bbBifIpV1/GBgusgAE2hodxQA/uC0

qatFqpFKZp6PvqEOeaWJtzpmUj69l8EOxsu7qgEcPmyBOcbF5Ha2TiV4FXzFOXKUy4GseQuK/ubs2zXF

P23E4mNVQ16AhP05u6x2iL/oB6bRGMyLtlacoorxRE
pucruM9lz28P4f/MQ6MV/aNg7SFdz3aSJDYlerhtxx62E+c0//e3GQoSmd0AhqyYMDX/FNQVkvwANQYz

wPEJo7G74q7TCYN5cuALZb6mzN3GltLB/vAEPbgUlWcozg+pl4i8mGjNl8+dvWJCObFcbAVzyBDlsToO

gY5q5hIsB3eEaYh0+o+C8GhuRm784a1KEMZw/iAwmV
Fj0ZBYkMcIg7rk+gECtO/TPdkl6a3jjy2QdVbkGwFxxUJiAiqqjvM4OOekXdhyVOJm2K35Y3pPQk9SLq

Y/lZF6PLCOxGm3L9mEz/+hJ1JZtn0FdQsJpkl9KKscScMyOtn+u8h4+9vd/uujng7w2jWJU+vNw/tuZK

vOgDmffRJiJGELSFjivKEkJOGAF1Dc0Ek3+TrnHayF
MuHtQpxkcAz24LQ0Q2lQ7ZvAfOg1K39ws5gtjX7+vsreEACo0Wi7M+z1E7FOnF9JFnSX2q75+rv5jwjg

o6koLWNqOvPf2thGjAIhF+nbWGpFAydy55soY1ruhAzFVHWPiayqOCVt5SW8n7K9tcL8bXvOgXvPODFQ

ixoRT3NS3G5xpgfUllwLR5UN/MWsXTnIcT8NOk6q/j
HFt+z765HD1Pzo8KgDpFddSgZOnK1nDq061cXw5iKzGSW6x/[place]/qd15ty5/rFRJEP7ne0L2UC11q2Uu

NZe/aRF0RY5KXLcpgnTmfVkgw0DW7vXQ4IlX5QIBJ51oO/0ukLVaJ/d34S13LZ84qgRb5ZKBku+60ouS

YnplpayyY+WoaV60KhQsfZlcVbv5teiNY12H2PnQeI
5F0QrP0q5OgNCfzEe3TrZ7YOJ0yVY9ogMdE/IUUGOnoVjsMOtLV7ULNoFGltaH90IKoFBNis4EaDdvjL

c9zE+YNB20ZNSUc9Q4hBW2ek5EdC4ttVTL1Ugs388I6aU567d8pNwCx9/7w+cu34YMOYDiOS7HJMI68A

Db2mdDN1A0fh1XdRugvK2asoR4sfa7JARKty4gpmHB
CSStCCP9Dr5gheTTrLoxHL8ihzEyzv5uNRoTaD/u77rdvnurco07MMHmkfrE17tb9PogfeoxwxnGSyyI

xFIN9drG60cpWIjvpsUzo1CT0JLAVeibOOJGjm610xetdpEfhuCuEx5J4I8IqVbor6SKrByLHlpbc9sU

5uLU8j8N9F67GSH+DKTl7XP0RuUjxnDTtBqF/os3bf
iTbad24vrYY4D6x5k3v6xaU6HbduQguzCNNTmEI/B0Vo08FuxYrNeICiIUqwBbmpsas+iPbUdf0n1m7x

/kYrFgYovmp1skV14beMp5XTUVVnr2T9NdOzkpPJcIDwXCSr/RTtpz+37CKsXH6ShjTxFaPAgHnd1Xo7

ifxqQlQXzkV/n11zlDeouazJFsH7uzjp08V1XxVOVM
XIJysVHfnMEosWoEPoy290FOmghLrQIvJ4jP5gBywrteKxxsePvmkM/3AHB+tM5VHU87rsZzw4n2lOq1

M2o54O6RuknXm4h90qgr6Rmq/n4wjC3a+fM8SFwVhciDX33vRM/4lMUy7+6B3ltqvKKPU6IvpOyeeMVE

q7hhkSld0aki21/kIA1EDkpSn+O/7wpKHdr9xszM/O
CwqHm5T/fX51ItsTacZJ7OdFCXFX6zfAJMO5hS557d6/he0cBd6n5Semiw+pym4m1p0hB2EyGzuKe2A0

tww1pZr0P3TssabGi0NGnR+pdl0VCsFQDV/0k8p1xd7U1rhg9AGiCi8Aa9eiqoHB3+mqmTJFWmMLnKJH

E3NYDhfKJHQBws+BkiTCweliWtZ4hQxSlqHp6wHo7W
ORWYOt0O6Q9M0SkhowP1ONe49FX1F9l1k1yqX7LTi9zIlkcRxwACNfWcJR6IYFqH3lMFVvxK/4w6iF8c

nLpM/uegndqobwT4bcXukggxOZEg1BKEkZnQdw7cfjmoOLiBhRv35ZzUsAcLyDxqxtAE7hDUYPDwIX22

yJfyoyQNwh/riNR2aagr1ZYw0uHBeUkWSuf4cgOmuM
Sbx9cZcOCXUsPWsRmBCz/vnSvmKLxKgSARTWJ3GmtiZ22f3Eas2QMHjjxfN9dYLqtYkChRZA8ZibdWyW

eTxFkyRZcILLVAHHZu98sWeFJGvyReEnoRhv79EuQbzJHwddZSs+qHQdE3P24t9DGSkAYW/OSjMM04bF

dsDJ3dElOyysR4aKU92kixDi9EvNU2lK1SVecgEsPR
K2STrSzfv2LFSpETF9XMbTfupMYy6UcCEcWVe0REohhd8UQrUvVJKUpmAT5JBNofK/7sAQwptWXjgWE8

CBDxz/iINneA6zzuwwuItiW0StpF0AXmKE7CplZDFq0ynxF0Fse9MGbvm2SgB8f0MXPmKwKoQ/GxxYeY

/G/raQbHsd/4oIEW2yGUcC1BM0i2s1Pl6VIC/Kehbs
2jQ+3aSsmsASmzJhkcfkiBkRhzpYDRuI6nIymBqoTlSODakzOJhdu9eFYxQItPAJosjOyDjTO2ZoWwaH

TGQccC+ogSORIk8RKApAmJwvoBRGrGEmbGnjbzpgEgrrkQ1cJlxMb0aS4+fvIyuOWXKETUxrSsErfPuV

3I4Lso//muaMC3grfzzF78MqJYO4H5g4BfjZLSvh27
waSTZCwFCfUqvprpMS2mIR9TixMdz9LlpNwKz4PGwepL+um3AE5kgUC+7Oiwy2NBYF8KWYMoFCTjs3Tx

pdFGxHSqBWRlJHcqi+U6a9ZqiNjpOWlmBAni68VQka8Evy50zfi1qG72w3SrarygcliO/PfVt++9S5vM

SZJ+98dG60CtKSX8q8LyqExfMR7YPuaCcZER7qvBgy
ia7a1YN70IxOwC5NDvGAAMxU5wjTmT61l8P9aS5Ffe/5zX5WwGQXDQ++YAsdcuqiQaRF49YxGeqQ8dVu

7yboPxm3Yme99QICFg6TNyy0syZ/xwc5nHEBBBbZXk7uDXY9ViW6yu1yHYUkWVOkPm5wy3P6Ap507uhv

XqrHTOQo9dvPZdGIVjw4FT/GE3keuzsZknHVMpgjjk
atT9KK8XWmTAbw0TZ772lGl089x67HoxkVZoJFWZ/p4YuW5MnCRZDz41IL9b5+IGUL5AFbc6iplbUqdd

Vjed0lt9N6pQDoTk1ttxBGDu6JtdlnZRuMaOOw3eyBQoUS5XVvfwthAiuOONM1xlhYVUDh4WwBORt6SN

tuiyKju2MDjIvh7IY765e2UrIDHik1uu5D8fzVAD3v
SNfaBD8K47KEVb1eiXAW4cjTkcCUiMKbddUmQUW0xgQ3Ccoz2bLYZURSF1f++HKnrWl5Ok0J77j+5NNI

J/55Ps+BozBUUyhJnYbBgm7QWkLIH3tsW4kDu4OGErIjw90j9txwsT8g1km5lE/Cck6bbTpE3wlCPCbe

2UgU1fBELhVeGsvzFUgXsvrm1+l9Y+EJ7uc42kOg2o
ZPIX6acqioHcoNCwmeR+syVftXlfPfoFx3bzkIWq1E5HLYqAOorxG0BVUeucG4myAfFS3y9dd1l5dEc8

S2ekFqz5qZ3hWqNmqJVvZqSXX8UKlPggTtknWAh5dMvjoIKIRgRogWOaCuFl/sng4JfEjLU/GWS/er8/

LsH65I43Kql1Md7Xtc3A0AKHefdNILxLogpEezS0KN
iib8cAHNv7f7j1I8R79boABewrvwgQZNc1Qe1Udffa7kaBFkHYdEKtIf5B5UWMNf7x3GS/IgfhAsq59t

JyhNVz+pI/y+8/l5Ehn3s7bF9BGuW8Ir84xrqftdcH6G7MetgMgD0F0SC51FEljgMq7j9VTJMaxio/De

nlKRagXwM9HDLfMahRxBC76cetD4nZpjeN2621wn6u
AwueWR+AuT3MVrnP78TSGoHXagDw8zYubgfUfL4chQh69goaX6EcanmyP7s9EXgWwmo+q3qAdTtUOAzB

WJ6594dC4JjYze+V9vmAiLN9iNpR60ofuUOU0cH05UFvY3d63U3S1OTbWGGxFR8HhjTjDKGNd2153TOb

Vj+9Gc7TqwxiY9unbMx+xMghe96CbpV0teqKidd+Re
3p1kiDBiH4jneBYAGDCUV4xzqbT9vVwZGnxJa6bUjuJNq7H7sfsSGKoJwmeFQUItSMs+vmYutqOkRbtG

7cJp9ruoUgQopcUn4pipzebWtPEbTK9ehyCsKZPSFF00IHtmH79vqvXKerL8dkCStIfP33zArn+C9L0M

rEUwqGlGrzmgkA2ARdMoVRIWLphv3UsFs09F4lll8I
DyaAEo0/yCq69w7sA9HX8KfTotD+pi6Iem7ObtS0d1CGz4cep8w7TF2ypuPx5u3ckNFyGZ9vIT+IlxTL

mpoLYPcArXQdBvWKTzUJICJpr3/TpzIWvSIGDO4d1Kzi1qCayjDmVTma1EKDrsoNkL8M10LonD3Mh/nM

5A6hn/aFOU1d0OLcmuT3WyDFn2KvdqUnEjwoAB6W5+
1VtrYvQR/YD/Af9uVQ3ap/3e3/hl9ZcbnUZr1uYEqj78qvI3G6E40TuMG86Q19pP6l60jQYRWqOoy0QQ

P+KTkP12MfIFQKJtpYCh8M39qzdkesdGgl+DIr4FIOeYFHiuOcOSjTTJBhdptNTepezSx2/2bGtdAYko

6fihhel/RZZ/ojSiAwHrl56Sr2Cl4IJ3QMCIea6QE0
gOFeTFRLc64KgP177NNRH43bMOWGwRUsQGe+Oqbc6tNr3ZXolkXyzRyGzguGWfHRGKpZ5UOcEvoeOhMh

0XMDTjh5pPYYPrGKuyZvgD99/A3MbRKWCSyA9D1H1ufzB31oABeZKefIQualCSc8+RxO+5fj1wEdnIh4

upqJYkSR6mKZLrtmu75/Wx04cNwKQWJCptu+l4/Gcw
uVJRYLZ2y5m9iulUDiZSrH981LaF3zEMxvMzb8uo5Vz8K+rXaRaPuymeehGkR3Pt0eVYnSGvDZpMIUKR

s8mpF51Ucgdmu9F5hC8Cz9xnNG1XHOUb5najpoOX7eZk4MzdoF5SupDBGbhz0Rw3+navwuOYp36xvbee

gauSrlK7TX3rsv016rJtvXNjNbswoYaLMLH2SXn/KW
8haBrlt/X3cfKFbx3gRh2iG+5nsDXLr5LgA8rNLwN4FC5QcWQutIG7GkPFixTyFeeF3D1+wXtV+EOp8L

xtxx3vIok8pg+AhdUecylPkgabY6Uk5MSKqYy7yH7M72hepW0yrlrz09pdElHreLQjPszOh8dUVjsr19

yj1nhA5vpeqXLgYz5bUeKdjddVrODxVN8mTnIY7i8e
WOW1wwUXXseqYfJ7mPbWsH7U1YUd2CSbymwKMg6ugq8RJlR8l4B4xf1v9q9YazQLGQJWruHwXWIz1LyU

HkWGu7TNogCixWGxQr+oC939WtB5pbj+XFmlIFHe0C4oXKSbDWFx8j/HHbm3hMphP3N6CKiht3LWA8N2

dvmrLUw5dcBErFB+0/PoWFiH99kGg2ndFIb4X9lkOX
3RE3VVaJRjXSwvslVdqeMyxyScvsCZjpGZEqtcu/diO1teLoYdc9liplHhTtUbXAk3LC5K2aIKEa4lIL

IL+wnxIuc38E9ano0z/Xq1+2KoxQ0s88uGM74OiqMMYM3naIasuSDV0vXqHZrytvbAwonvJZZyMpr3h+

C6LiUrV/jge1F/2jpVAOCEOO9u7ZEbVBpMXXyWEQ8/
/9MZVOUsMoo1IZB9fN62S5/7Ix1pxwbwffR4XjSeMzJD2xOLjpAXlWjkC55/Q3IptbS3BxSBmV5dEzWm

Mu9C8vsBUZYgwHLoSSJde364JNwKQm29O8FvERV/7IRslSbCY4UwwzJQBbMuTqEaegBSWbiBrgoaoDEN

42G5hUmwIErECm3fSD4RumH1INFbDT8qxue8snpEh3
AQruzRkq6CX9u6FX+ni6X+9O43NcaVwIf2LsR0YojDqpGqRiDWn1pTxVkXl5Cqn4MomOQoKMF8LssHEz

Pb1ZzfJp9m+7UgnBjeIx8eXFqZKHy2UcIO3nqbpa9a9dEXrWFhp5oHKwWngo0NGnHxTROgIm+XzBJIks

vqx+ldfqMEgWOorcnrkN5GyShXrMaJsnMvX5mnJiI6
zdf2FUlNnkHNNt0ABGLdgIo9Qn41iHecw5AzjPRExxGEO+0JKDkKVb8lFrILPk+aEV09bRdamntr+FN9

EeZOlXYUZKgR2FyT2QbzZgbY4fwiOJIF6uTZeWCGw83PyVMj9/pmRUMTEnPSepD1O6r2V/RP/ijVp85/

VnLcLiKsUdAU5kLFDe48YLW6i3gFtCXTqfEHCij8TM
6UK7dkH11AkD0OYV9B+jz9D/WT2FNjdFXfpsKwCUE+KXelmHDQuPvIlGrBhVGn9xDKbC7XbSY5C6qKLY

rGIYKTtQDngKstucB0rpHaHiGo+L23Kgq+PgNlqyPDZDPQg6UrNcWkRx/tI9U579m98W9GfpMx+0MF/k

bCTTAykWd/7M5Z73E9BRE2bTTTl5/cFFHuLleTfut8
fMOxmKFa9MVCf/R6vaYnLpINnog1E99fzkT141JsEKCgjwXAz/gbMLGqpIRdz2QDIiWTKhTMQ670VFXH

kWy81ck4UkJp9URz2rMsgKNaZoGJJ363L/X+1a+LWwCo1FX/HKNcKbiLLBThEi0tVSDuCUvGg0TlK6lm

y0/7D31TVVdRIXogy+CmN4QTHWRf6nCjoStV8QRuFr
AMjlofCcAC5meJ2/TdkDQzKqS9m831xYRbw/7ShtUbthbjokss/XSaPeGiG0gIUsfjq+snVW75QACx6Y

JKMBZGDF/s7KLf5XGXmqdYhn147C+OGfqpqmSAxpG/avVsDepY+D1QX1EwZlzj7hWT3GqnCsiaTVicmS

1JP7Q+v8dZdw1n+EAsr7F3lEtAAHRKY/Fp6wtlZ2xb
cU6KiiLLPGrstWSOp3bGal2XLgqLYUOBq3+/serkwb+nuWRdjCdhpjf6GqykhSm8q6/0Pv1HQr6T1JXU

uqEarkX//WVRJxfuyc7cz485h9S0N7mXkgZV8x2EYLoP5bZDia8ku20jJFQ4FCbg6UA8PvmZjcwA/u2X

t9pX/ab1OYRYGb4LgZ3feF7XUC/sA1Yu8fHnfxpyff
WVpqhwIWADfBBYy0Zmrcv972vhmk5tbJGhNu46dPdjw5dAXQaSPBox9EyErq/hIqE5MUOM5GhVyLLsht

Nf1xupxIxWDO7yPgSY3XtW79rDkKZ3W4bTOcExRlkcZuHpqpE9pr1/MVWxRST8CmmHM7eUEixf2p0i1Q

ChQUv0xrBX+DGVTsnJJJjHudKjk8LOuOLwjwUBHXmj
7FR1hafRppebSsh2iLGJ07L8CjpG145IMaWDiUE/QU1FlA6ajWqQHytFIlEXDLxaoWTkvi1GqqveLSij

T1a71WBXDr3b5LWzMbIsYQxGYgVs6ubOkrb3nwiKiW3CDQ4gI2xm2MqBlFUshKLgGs3c9siTzImJJ8P6

7XJWOWlDJ26rCeNfE7i4NR951F6d8o1a3PAHp591ZJ
Rak9CjaSvn+6aCf4yFphpvni6xJT5GyHVYULlVYIo+MprJvKCewn+xWxSPvtBzNw6VatNpjnFdiwmH5H

EEU9JX1Tz2GOyTj/3EFP1TQXtzYrdPxmSfo9AQoxl1H++VHJoEc7hsuLCo+Jj3mN4Yr5t5VAF90kv6PY

EDZovfFon3d9Ff2pE4IBTxLTZd7VmkHsv0tn9310dd
0P7nyfEvG0bgivDyuy04lcFjd0wpor1+kdorfN5RKc3lrdGh63/ZP8bZu7xAQu2+4H0owq0wI3EyNi2E

CU4y7dIFniZ/hZrkobt3uh2YIJykAZlrwkS2JeqgFxAyNe6Es53Xl3r2w30zD7Hi8OKiAkALfTY5AxCT

VEmJrjE6V3E/Lwr5xCQkCSrjkghEmnG8wWTRrRpCs3
BSte2690O3K3KXu8sGkSPYf5wx8vdygdGf+WRPIsGYwqZchT8S6u41+vwV/u+ypxTSfScCR81YQ+AUua

J8g8LWPgQNRpSrmTeBRXf60WWckocW7pEEf+uuWMXrp828TnF5v3QCbYmnSwcEpT+NzXLqF68ALEwdlV

42i9VK+vbPsMpLvLngOzRrnh55jpBfUyJd5Hsmy/Iq
kOQ2IrnmOrt5349xqvM1g6HkZskyFC/AT6+I9qTOze43GZ5irgcxxmiUagMop5osrcQs5rO1F7bI/A1v

XaEZdFvl7Oa/EsFdLjol+U0k9XW7dulwY95kpSqBeZJUL4bDcELbuzOUh7Z2B8of0y8diwv5OObNOrKs

80PzzfGSdV8i9kvrthbOi6+67YiLR12X13j31cq4L5
QbyYwxvpZ6zFjv+Nk7Eiff0MazGPSaGkWtjVZZrchgcvquiRDgqkvSmWVJDZlCDnP4g8Ftt1FVASXNA6

Pj/JkF9aM7ehf+A4KKj0e4cOea+mkCLacG8D3CxcPIYNvqB6oFwvWmhkSpAyhiZLQwbQjk1waKmsFcx1

1xGCHK4dVaf1XlAin6ElPact82FHqwKLEg8NfcOwtG
7WVEG6qFF56RWTqk/d5n6zm9OzjGZOvelqP5zdWB5DrymL7toZXSrwjB8Ib3SQYYqTVB7vKhAgC5eY6W

8UOOIu9GGNOKEeoPoIwfH6Qv8FscBJ8h4ksvR4oq7SMY6q814qpvF2zkT00LDti7YccUQp8xFq90/bT4

TZ4uRL8TSEfcDzBETWLE0/lVt9qv7LPKH/sTEuOkU2
84No6WzV4hxV8b4oRyvaYsVn6DbVrEdygq7T0lvWkw/Nnud9I31N/lVcUFY179BAqbSgwXJp9uTFXFxG

Y6S/9GgvB5xwp6RrP84JNiL/Ii6jrQPV6v4Z0gMJRZK+85plng3Zl1MZ1uoIWAvM510VSBWzjbDOW/o9

Wjja/SNi8jNR5ZIA5jUQq3ta9zRWspia70EizPN1+5
kgTeQYfCSFddadHGhviNPwpW/xwMxAgfvKZUhoOnZQTmfFCDadqzlIZL6Q1xbI9VzK19z0htUn+cTP1t

ZjpwkkEd8gDO0B+GGtuPSrPpYwFK3eMVNHPVk5DhLxerG1vUB/azRsoY/8f1QGVh9KZ3/TqWGrQGF239

jHh1Gh1smgjAEpdA57swxKz2I2W4OMRb1s3nRin4XB
sgl4YuzWfwWDoO8B0N1SfIPUofEaMC/CN750JmSlXzmo4+CqiWeeNgqAC6XW1pV9Iea+c+Bx58VuQmI6

QfSW+KagF7/8ZVFT1Mekm8lqrmgc6JjDel2jn+FkzqxwIDR1S+hzu5ba2AvfqVFiHi09Aw6Vz1oFJbE6

MaFLY0tn3+9SZyjXvfD+tNbVRcWyViZ7iZxQk0j+y5
rcP0/43AYB1SAeyKaV4ECGF2vEmtz2SduZb08SkfwmKPUHhgBpwWKHE4PkX7Qz7qzpT8VFwFiHVyGJP2

rYBQuQpGe92eP+ykNH1mMVI7pi5q+mQcxl46gujMSPdfZ5ygufiQwDy8VpvLipkWSGZC2duhcM3OgDsc

AKxJp3PTK/SpD2ysJcLKUco/mPGXcPLINvr2ceo7tG
xyCNh/tCjqG+2vWjyDfvgiL6W7+/Ganyl++zuy7EL2kj95UCzBxccvzJF+A7CiP9sxD6tq5JhmTKl2up

Z6XIUjShehBKiAVm8pOPKVxIEj91s4yS8FknDlv+Jj6QSnBT8oM4U+GKp8GtqSIJUrHVKkQIi8KJDOgG

EzqGDQiz8KJAChCnbRm9WwDRbj8mqc6480B2lvT8h1
KT588ukalj/411JSSapPb3K2TemsDqA/K5MQSWJ/tG03yWhri2Gb2CEXQJkypJM8UcYmnDvwMS1L7h07

elJbvt+imSpnN8Fm1Tged1Gg0jfr+99wCe/0BpXoqcycd4SB04MfjvQeNyBig7YCZ64Zr61lzrTzaJbz

xyf6yLB48kp4UuCWrP7cViR14pHV0Vzo/RQcx2q+19
VG5JO6/fmTyePrPMKzgVdCjDGEoD/sW7PKb2TP/qrpQP4H63CLWkg+uDDr5rlq4BzVojFvPn+YQyfxtM

CEvIK7wRU7kX8Ygox8bbSWdpGJd+/VvLBKrG6vT3C+GRfcMBDlYp4a8nMd9eNnPFxdI80lrbGN5spOb9

aiiwKct76EuxRLjnpWasjmqKdpfAvZkEIwtVhJorVJ
nfuDlu6N3Jo1vgmtwQFwPOEoh2p1EQ/n10pSKMI7OplczJ8GLNWgdXrblZf7mejPM5fGwaGxaFUOI+bC

WVX99rBWpQb24QUROIxqCgUr3cHJYsr4IEY+8B4b/CnOp1ERB0Gn5pbhg3MmDBiH72e6PjoEkgXRtxFO

ijOW76GuMaMgot5xuHH72LdW+ZPdvAFHN9spwiUeXQ
4CK3cFl5Xre1gf00kWS7xwCWdTGStb2rU7JDDvUL4VCtc2gS4jy23Wts4/0RltPS8q23r7b/h+f5ktL0

/Glo9/mD5YGqT+M3uWrEN8x0xPDqY+Xi+gJn2Dv4nxEB2LlmtSGrbtMnmdW0UBpI6ywyEXdMMAzb99SP

8wUvaApZ1OvSKvknYTr9sfazj+7z1OzUsqP3TsoP5W
XxF8tGtxSRCfuy8KVuitoZWWKxN1SnRvapHT82feWVYh2789f7rNTjvdyDRKojH1XVyuJkkKCMRShDl0

pvfmVy/hAXTyylBGdiL5bTAC5wS/feVINR7aj7yoK12wfJOVjgv2+9tqtrZixJ5Ev1mRgC4rT7CXmPp9

xqQCQp501yX6xruAI9/EkQuXTS1v5mmXD2UjjDUZnj
EqKXn55sFgSWEaPSWqsTRsm2HD52h9xuC8iv6qv2PGBY3JHPtwpWpQi8hEAAtNj1rHFgRFs8g9ZLTXwP

+7bwC5RwX5dr15/xhERg15VhUiqgUx1PEEUg5T9BFYroYymhSMgp+CFG8OtGwEr36hid5PKvGTF1hoph

kleHd5TZYAZqO2PzZKUhJnBzWJYJtdzzwcMU8J8s+a
2Noblf8GUa8s12Sar1TIV4jDKincptgCiWg28I8IJlAXGAkqmkoV2Q6iZjhSujN6Q5fKGhM+4egKCmcU

JkKjfPuFDj/gBe09H5fwlmUrQIME1zwcV6zTBPbFVD7GkhK07FacS0jeiSf6hWnOR+KJxwq/PrwORjab

f8QA5WzQnPaVttrlMTpgI4as/pmH8cK911nkeOpZ3C
OLiR0QORUlnTSnkzNZvdN/nvAW7jdhN40Vp4czjGp4qCrEPHfwYu2tM8TAE8o67ngBRK+qAAu6CZvGWp

pMdKdsbXj5EZPao0mCtbXKfnOMHDuslSwNIvRrFbAgMBAhtslb/KxfPw47F6DLXSE9WubxpHHyYrJUV8

ZNyrmwztswoOLndsh/3oR9azXUXWPWbvNCO0vC7b0w
DUi1OIodEUVZYD1AhxqpKaeJ6fc2XClrtYRnsXBDypyL/RwIRPKFWlCAFj4ME6PobeAk1Sdh9YalZjvu

u6uZ6ieSnvf8fSC4Dcji/VmA7iTXMn7r53vOMBEhQBkUjozpvCbvkJZvEqDs1C24pLUubbsIu9EK/CTv

8lWIjSQIIaT0Xrg+0dWzX4U5MYQdZFW/dprqCEgQwv
T6STgsqtmpO/SwiDTYmfw+MPWl3H/4zhyNva/xb6yrOgJszRMHTyfW9UKVb09kWM/cix/ZWyGOi2RT6q

xrDhqlBrD+eMZOg9226JKylVhbwV659L0/RFyrHWawjVIqYg+XQVN7eokYgRkZHxMaqP7oRevEn6hOe3

wJQaKZ19g3TubDlzTurg0Dk++ssAqYdFNinH4z5U0B
SBPhsAbm6WCSRcIpbPBSdGAhFBaDx6380MDwJu/5E3KgC5nkwl//04OTNI26pldfJVUSNjplnux784TM

JfN6t70v/6lOKycRfcSmMYK76ciwHLdxNqD/GIessUjTypj2B/r21KWi3PpA7UvWVmovxMrnbOOku68+

s0E7LfYcNvgeqQwy8XTqTMfqovQ15E1FDjU1mswR6u
h4hC2cMeY7l6CDh7H1UywlqUJVqiJLxp1WGqkmDKwVrI19qcO/Ob43X17sAJNVheI3nHIviuXWCcLY76

mmW95StFIdPhodH+Kh/cpU1INudpOkjjcJBc5GoTILpVTBsIcImxn9tCrhAVwAQjMVKB4EsTUK63AWcs

mKTfbM4LFFZdgJrUCpQy6ACh8WdonGmfU8g1dXnUcz
q1oQo4BFaDchwiYAOY5R2p/S+j+7hxPR9SmC+WMTO5NUw64dsrCvVEQw+cbYPmxwBpPUbZW6h18X7/0k

G03wjNc92Iqim7VADdFjvBzvPhS6uIZVZAW0GKCr1QoUEHzrm7bNLOa7K4rM6OGMklgkOLGO9YhR5GKK

bCq3DIMpl0xA2C/1Xivrdu1AjlROisXEzkFLNuHAO+
AlSr+OTXnCJBYKYuBKgS1oWY8GJlxQYqV1U1rMUNR+u7MiTdeqb0JY6Ft6bk623K9fwiY172JnHiLRky

RlpZxweEiVZTeM09j4LMHoW1hcsD1QzFr1RAc363nVXpqi8m0QF5TU7zhk5GPZtLZhV2AddHVIxxiLBp

+HaWXkixn2EGJGv6umY6JJNYEALzO0JpUWgfhCPzTj
lL1rHor7/J0MMPMxGIqD60lx1aMiTADCZYVPLX9bpPKYUn23Cbj9C7kVdVg3QyF82hRXxDStayTgCgcy

RGXn7loTT7zjSkJYbLBZcC81HOL1CZSUFRkieoVsAfnHVtxmJ0jLpxPOMgU5NrIzt2skBzXYORXSf9ut

i02JhWgnD/E1yE/AOwoGvG3mlXG7qrSWDJOjjHai9N
s7Wr4Zwcz0zergSWXp/B8eQTnfqqrZpLhB9bbpkyjoZPl+rmk6zlkZUmqYXA+KZhleRKGyUf8dqy34rX

9j7uVp7iX8z6aJezH+XMpO8em8cDW9l1A8C/eg6Y8AgrhRj0eVG3EBnG/1KkFKQ7lae4ILfG1V17+YXY

DP1DLJBrY75Tdcrn2z+w00OIKg6ZuwozF+MjeRM5+y
GzOfg7TVy628515RXZUPlThZI7tBoZVKJyBtp96gMc1RT+46Wt0uuk1CtYITWT0E1jkvmpUQbEqmU2Ou

4zN90X/atnmExa7WgQgjDGzVi8LwmLrBwerPPvzqu/n6PUMdSJuPXvq0rTCOtC860+yBgq5wNqTVLtIe

uR57CAqmPgwQs+H06yYX3vCa/j1+VoYD4xk3pwwNzf
hcRHQzPCEjqmWrpMDHyUh8aVNSVO15jL6e1rkqK9ge1xVQ8ltZHWHmIzvJ/OY2U4D9Gw/KeTF9EM/mMO

dPmhpMDuU8nhnt7on26AfdQKwAIHwCjPo61pXyFOrt23HsEcQqRyViGCMhKp1OZqCFD7ztwe5YTKX/Ew

8iL01LsDayMZpeQVkvIRKjfXEZ7TORRBVVIPfBNX/7
ICGfLZyRo8uKTqfIeed7ISF9hla8mr8W4QbjrsZ1c6WGWZ6JsKUd7f1rWfE5z/UPd6I0FVv4/h5bBNk2

2hpmNa/vhx1sqS1Yg6KPU9iue1Ve8Bv2n2VWhMJU72g92O830cNmfZe1i3o82U38w+Y2ePx8YBYIqebU

O6LNg2DXTa7ZUH/Q17QJ6rjrkC4F4PfYGq13qD8cU9
zh0qUG0kJq4eH9K2Jm+lfs9MODG70PkRvVSyTVybUQ2eIF7EP1L+D8sNj/G3Td779FwbKRx4S01D6+oD

zJSZldKLMsibMOszQ9vf4W5w6IoadTjWc1fK14kv6iUwiONXJpKY/aL96EL6GSCLf4gvTRtR3Jcek44p

a8U+BXhDbm5HRxHCA59NllnxKhDBQbfwmDLtAdIzB0
zDpq6GnuBgC8b+OhwMlcJoeQAJ5m6dKWu5aZavI7iN9rE6pjgXvuNf5STAYK5Cxqgx05p6edT0Q25Z3G

3lncdZh9l+w2zoXq6bYJFryfQ6uehYKiEM73a/39PruaK/bQbnKNOMDUus+x05TLgOwx7uVNiB+lcZml

xcKXFCm0OM6GJBWfrqE4kT4wA+9yEtrJX9zfq4cyyg
uiIT3yRJM/ytrU7wHhjGQCD74Y3LyIAvpdVLTA0uGkDjPgR2MFfkEOGCfog5cb2NhW3Nwfs9Wd1vlWPC

hDbdHOA59sckITHXRCFfY4g1U7yrTNcViggsG/3xGTQxCSbASwZ9RUhvIz/R/KIKb3zhoF6plaTmNu4H

K697fXsg84cg3vzFluOaWFSi/TD+i/BsP9R6y52x5t
o+CMWBYULPwSaIacNpOJQxA4X/jtQVPk4VH5sTs4NJQL2Rtf2COF5xBZnwOG303P0XpK9AZ1tg+Q/mSq

I5u5dqL8RpsG0FS4EvbpNTATgUhmbDdgYBTxBvqlYJZOcxxQtbWA6DnQGI22G40tECwro22fQaIxdLOs

Ufy2uAwC3Clt6PFJpOZGClb60Zt1272N8z5/M2+YG4
dXFSFkzhgF/8D1B0t43g3W69VbONRs5oyzpqTekF+FVXGSrpoprHHd6g0aZVSxFsRl5PFO7gMoVzOjmy

MZlbFtwHoP6Pyi6Ef+WUPwAJW4FZaeQMbPQLbnEnaNBSUEtXLD6HZjniVb68unVDsPAtP8QYhqF+WIOR

tMutB2Zr2YPzLKRHWviMcUVGbraF7eOT8cN9FqYQkC
KlBsHOWe4RhjTcblezN4qKBzZsbC3+Ebn3bPFvl2WyA4hsDfQhUmsll+YhOJwZn2OckeLzVQpHSTJbHu

q6PElZEabj6va8feafBkmy+jGQ/Worj9ATsFJH0UiB1rr59ugjf4dC9m955GlzPCtqAzPUAYKPzPvDdf

3Imcnyvym55BIJHxhzMa7ldlAHj/ez9neHShHL2HgP
hnf07kNkn2Qwqmb7KmoiBjFuyFQGq1kn9adWHa/ileR0KRl2MEUGLdlVMqf4z5MRYEKP2WcPtlG8DM3Z

DDEbvBcHJV/ydVKzInU//yxpprM3hiwXVA/jALYE/mshTDi97Ik6upi2ciqbxBCL9+LcnPIJwfzWpvYX

f1+1oGr8kGAQwhOugdqehgBhe767Onfe310g6/FPbq
TMOc6cQXGvTHNXYB3wuR+m9r4gPaYh8IBXyU7KWOPytwjllzuF3IDmuv+eG8wLtT7OEHaDf/dm2jH/6z

l0xNmHaiIwkYelJKplDfI0qJ1NdHh0tYd3a3/WpR4OKwTTFjgFlBnxwmN4tv2PO6BI/8Z0FJfxXo7IqS

+SCqAhdCxr4ZGBQg/XOcGuT1MRiyT/MlhVgMkc9I4K
kLUtc6mTrtiinLrzXG858eFVI+qBy0bPlULfqe+uEnkUWlcce/oGo92aTVR8cSqmT47g5G34hQvL63yB

Z+hEOMO2FtmxK6UzjiwqecJF+9rTxHIRpCsj8IrrYi9MT78jahE3iHyinQHtE1kSYxIy6NZchIAJ1Xru

9vbQKYq1mqtL4p4w6SsOcWdb1ta3TA6grGdnW7p4mu
kgRpAI5irw05UzMp02wQLtBjaEs+RMpvAGAtVh2jHBglo//f9d/4b/d6QeeIE34O0o6YL+vebs27RDCq

LT155Oz73G7qNdwH1c0oQxt55L58qE8TRNV4KMtXhUnno4bcfIOTTqJuIVmK8B7DkdpoTNciWfFC2jnJ

15dzISuDJ9977yAsLcLR9TV0EgsyTdOI/8heb0vlSz
E0l53Yakg6rYqypwVuP1JmNfCmrrE7GqqO1KY3F+LgXgvwqtrwac4d7hpPwvKpKUTALNX7yBw53VeSjL

+RMKwL/nG4VXAbmVRrtGadZTiCTNhZe9iG8kB/+xUv3yQ+XRLc2ad7jPaCNtETlZ5kJVAxzgXEr8Moa4

XsmmUvDP993A2O/jPJnUCsv8FtkeqCg9gTDLtvPqrv
kApTbFL7AhfQIR5WPfn2TXukERBIkDDo3DA5QOiHERcr1JH9MkNhq5x3bn7GrP+VQpsh8T309RSnOV0M

AtHD7ekUOR536ZcShh58sWUvItROs+pzh/mZIMr9VKD5ZZv92BVmDgbBtCXoltF587FzZi6yLhpAOIAi

RcDj6ksPs8XFoH1eRNrB8bVZ+reekvdjj5FJJX0207
SW2yPsMDjZj021/Jf17TIdtqD8xLv5hqY/qz7DqHbh4EWCoCxWeUbv4SuP//LcwvNTd0HH91PGUYIQ/R

WWUPwoacmnXkg6rq1ki0K+me5zXZuT6BgiFJlvIm8lhmiq+V0fhnTsgy8PFz7U1I35eKkU6UH3pcvj/z

4xSqnMMRJJi64efy8GA4GRexCPwhatxY+OQhnUX9+e
P88qW6tvQz/8vDaVkcZ8v7cUJCUaw/Y1LajyKFwpmej2WNWl/TLHywfFSjWwZdZao888OelHSxkdEph+

X1cG7MNKrzwmxpup9695reqjqOKY+2vmeNwYwZmAME9qCQuE0iNQCq4ojZMNzw3XbijK9MVIbsbML9wk

cwq5cjNgNuzWzHl655wF7YKdl0Qa49X8lxPPySRno3
fNA9wuXXI9C95sAu51Q3uIMDMf2weui1G4Vnzlh1f1MHl09RCt6lVDzAw5QnufRZCIGG6yZoaCm/GcZr

TNTnHNOEifprC0Sxaj4L+lFv8EEc+z7/iUJLviElkHTPNyxABp6AEaEtQ6ICeMTO9ik8fp2sj+Z5bkea

5S4rKEJUVnuW4iMzgf+MY+JrpxyeFs520ZhBwi0VJN
fzRo+b8/wjWUSYTkETIgwuHqMd63YxFOLcHdSGMR/6BPhIgfKtf1M1fnPyFurrmug/nfny0Iwq8oyxXP

902hYO3PyEq2ozFg6X3j5hKqRUipS8fIRXQc1sPRjOm+/E7HjmDTmcmfp2cTForgA+mA7xtWdMr6KH8v

jlM5ewSttUZL5Q628axfcL/WzgxpZwLtc+7ld32el5
TM6+aeMuBi56HiTjKZ+z6sbYiMAeteVI16U7mlQR7Qrf1Rspb+iXNfAg4OgMMFoS0I09G24Z3mIc/15F

0V9iVPR8bjqsRbKH9vG21PJwRp9B4Dr4hE4xpdOmfK7zsinaHPqgbS8ZV/ClnA8xvwH3WiGKaJtGR3DI

aEwAtwlP9TYecSWE3cFd2nIytw3VTTzI9jPEtMdx5K
3Kx+0tC1X6jP7VK1g3qQ3NzASTTV+7xaJmiec1WkaBYqwTkzNQlaWLJQaRCtZM5ZeccLl0YU102QdPe9

XpNE84vjBmEv0+FXqv6LZepKKNAj+h2u17r3/k/mF/ZTz5c+wH3X7rB6Oh85TYzK1D1hJf/IlU2Wx2ux

vTNUt/g9BqioX6+Fkll5eO1a8dLT6wD1gonYJr3o67
bOnk/szuVcVRUm+SHdSPdKUm7pzXWsYfAcKu96SO4QF9J+OmqS+4DuyFiIoqlhkbpLTUDlCkuoxrPJv3

c8xWUYZDewi17hBh0Ws+FZ5CWbT1XhT2WrG1dbIrCYURLISW+OfTeQ+YZu/IdpUY/o8CTM9OQBa5Mt6h

3qAsNoGhGDLldv7CkXb3ZRKhAVLxcfa6ZXjh8sjiDv
YYVmmUVTisSZVsBTuZx7Bet+ebSU1rsbDiOUVJIJqG2bthNks307DkB5ywuLskiJWukpZ9Lk3oE59Od5

drlRm8S1wuusZIM/cZd5QQ9zVPuCylMow71dQTDHfkG7n7x6BxIo2tI/yBHeJQbBjXxL2E7nHO21msin

vXJrn/5VUBHVYqPLr6f7bBFGgjHOLijlxzWys66uV7
bv/ZEYAHkvwMfN/IgOXtd0VVlNZ4cXXGXa7bWJGT+2HpjeRwOggLz7m/bX+Sczhdkv1SFgkxX+d/rl1j

FMLAemHAQEAabImfrGRUr5bQlq/Ifbgf55igQUTYxdi+0SvtIrBpjuq0SoMPzYbWAoN39PQudneBGbhz

BoWn/akibVj3fCshIu5gBm2hALFXhaYV2Y9Al8WeuE
ijFjPVoZXAMfzoPgV4GqV70dDthFv6VfmPupHqLg+aVE5hdTawkSgIkhHya+H2TMFJ9/cq0U+6qcx3S/

G9mg4cTjR3g/+IzXeMtkpK29HODIVcue6MdxI83+jRe0+CMAZDtMEwSvQWBAnRe4/pyvzCTv2PQ5XMca

yO/7gwr249H87b34MPh/1hr70/3JZa41I7neKlN22Q
rOxnRJuXVCZ5QFaMxvfIp/T6Jg1bcSlOQMdUgF7g1WC1om7bYQyD9L1jaGiFZ04eSYbqwh2hB7X2wiMH

/+VJ9L3z2yaeS8+6Muy6LG6jzlLsqIKefoeArzvPvrvAfGNJVgxtGGxPuEKO211l+Am38mvA5s7z22TT

eX0EGTYtkzcV7NvnKe/5fvIpqPAeq2hQGlWWC1Ztzn
s8nKpwuPP+jAxDfCA3t35nIywHxHjcCvNUETqshCY7gW5ep8wKEx1cmZdU99LJDm4C5gZRkO5/PUnKZz

ZCtaa+Hq3a1U9ArV2HSbwqPXCg6ixAZCS6mj84WYsDnA6fW881drOm8RPxxrYQ2wM6iZM+tBllyJN2Cc

qcO16rih7QGp/BTI5+GEI4T7Qr2MwV2d++HdX854nD
0BfZyjwJd11NLxKkmvtACEThIlGYeIDkQkSMtIK+IvBdbbj6Ro+Za17R9bM1tG09jBS36XtqtGGWjiWs

iidgV6jDwbXRGf8q3Zt0JJ7/OoYlaLakrUZ09MGjO0FV7LQJ7cIct9ZB/C5gZv8tE9lnKEJJAniyPW1Q

0aRnzG/AF8w2OC5NnBMsOKhHbbymi510ZMSfrV24g/
ENd97EeMDJpycnXfPY4l0xjf0/w9jnVLvNyh3nrjzE+RWxcMJT7O5bEWaEYLJOqDQorHMbXEKeJzgZrx

RLGq40Px8owgBtphDdJbDdc5JcRvRWKT2pUYw28trjD85I0a9hVD16Xv2OmE9Y+eBGN4n023RjLjJPtK

CzZOf/Ynze0d9qog9qpsUw8cCayRokVRjRTwXwUCJq
y2m4YDxsrRa8ki9iQdMiOZUYOPpF6+Rg5rYmDclLi+Lmn5KF+/hUj7D5Z2hGhYMlMb3xuuYdGu1GCij3

Zz1HgDkHC+qTM0irN6iMWGS67h6K1bNsSiSKT2K8jxUs0emudIMGR9j8ymCIElNNgY7n7bQtq00cuCnt

YFAu8BsIozzgwtxp8YPUxYcsY3KBs7IWKcWdgDa9VX
6ygR61+uxRFA7l5/5SmGstbyJzmiazZ3MQt7+uqZAFy/CUSb1Aykzc26Dy7xovBdslwTODI2GxVuMhOm

/GiCVt7B/iIYm7zHEeSqKhLcKBK5agPpox8A+3ifu24TA+DTgt3qmyf2scih3B39l35qVS4/NZ29moJo

C85K6ynjYQ6Y3counxh8Sz+zI4BEC5G4BSxE7YsH5G
tZt4bzt9CAXcC9+yfIKmgWpsH4Ul7BQj/8TGXh3vhv1cuiORm7ZxvRvL6iLwroqeoSDPfPMBWoWNuM9s

JYXgbINFPVaSMc5t91+Lpa3RO5OuNkNQZjsKO6IGZX3DShFetPoZSZt55Nci9GlJv5ixHvJuN1MVkc/M

ffyB5/WGspKTZ1bUYTRnk4aMC4aBCFzZQKI+u4ceWP
lkMRhpsIwbS8hrD/Wzz5dO9DixthoKkAnGdfQ6Z1UETDLgNdNtElMeWJV0lKk5u1yQssz53t7Xerpgbg

grHDci0/YpcZAVCRiQE/zCzrW3E1q//vry1axFRN1fDHIprgpvA1IEwCqfNgQC7CH8t5bO4/X/2r62tP

dmMJlct0quNzebeUij1jHfKTkHVviUa496Bg/GSxp2
uxYOgeao2fJZgkvQRUTSlwQNeHsUfces4tI6DjzqGZUiLpRM5NEgOZlTMHFccDPHMJO/LmkkfL3Zth/J

TTnEt30gFPWxAunmc3qdSCHRUsMaCJBsHvgIQ6+PLnGwCQ8QUT8Q2pENHKtJ8J1jgd+ibBLtLW4S3riI

Vsg8XzT65tWfpPnC4Kw7gFIl8OawITMhgREMG0EY42
RgAqFuGAk5ftu9Fedzj105csIjL/ZFch/n3oydW4or4J092bmgKlPb35B5LWXSFsQ99lQE2RQtJ5HUi3

JQz8HsZ8XbMA4FQ8hXFDn3LLO5/IbkxGmMCl5GxBoamHJWBDjBc/acrNy2jZDesDNV5AwtOKwE+4hNGI

89pJwPOTED0v4YifA4+rZowDkcdscXDp92PwFL7ylq
BxUWX66PD9pmQ+KnQBJXzBndAKTthURh9hLw2h7dA281gCxlly/voAXBN/MAaUe7t5syO/I/317nXQM3

Vj+2oZ9zZ5NyS56JLyb/bgmfLjqcUEWq72zhuCZUMnR1lp4z6Vi+XdqmpiQ8AFJDajPpBu6aj0BJkLNb

3Z+o5FYIC1vf58nx9Mw0N5TfStJfT3aqyyc+tAl7zR
OdXlRNNz+R/7ku+MpY8p71HT2q3vBL4eP+LyME4urc4OQ3cJMOLxUAbGhpNdo9I0MVIrLewkWIKoGW8T

IE/vlHSA3QzxSXcTGL6BtlJ4D7XnsHhz3qx6mED0vzdZPJjqLD75lgKIzgxmldWpIaJ2exddsVGPrswv

wEr4yDzxaNeVKh0/nkOLHDHWygrv5bD+IX5Zov3b4H
5WZfqPqC8aOGQ0RgCEyI/3XvkZ14qEupBppLbobAQku2+J2bQCeZ8TfxIdkf0bioWRgPsp/t0SZK9iKJ

bCn7KOXtRLwYONDQT8L1jSqd/71MkThL9Iv6L8WRH++fxtO5/ReI7BwqWLsUmrhP+8/G/TQGo3+NRlN3

QoPIU9hMRv+cjAVzlJxIlZfUCVjbaEIYFr6VgT9Z43
XB/Sf/7KnnzxaWPAD2axahx6ER43Lpo96EZjOmXeL9k4rcU+nRZqN9gkkWFejCIs7zT3omUcEXyLiXRZ

Lxab3EHPuzECw6f+VzMj1Duv8PixLUo8a8Ek5/HGcVoWRG0uPmyvsQi4zBoWxxboSjH6sCGoKSXBb1MZ

QJjkSVPbX9M838z3mtbGdPN6ZFamTePsMGgxizVZYu
C6qDrwTFcml2dZrIJzO5fYEBCt3ygXLEqwA8MgzvEBOxCWiGl6Ll1IVUZbHE8WYwiioLuUDCfgBJBxd1

o5YMKf0scXygPe5+SdmoTmwGrTmnNp93s0dlcwDGSjnFVHAhnb4UjO+X+vF9t+xj6q/MtPK/99C8zOIt

UL5qG73BjNLa48j/4qYFuezxb/uMri1Rj0gH3GkdKM
7c/8YbmuBtDIHFLu2UQX7EA7j+kljiDI4l5WURMUs0Jnstpv+M511e56fSRSixLPKw5FzOpbC+W7zy4r

R7c7w6VeoSCLaxSi9yfH2u1puXHkC8woeqrUV0ORThyHuIehtUBLIFRhLXsmS3i7hb0Id/i6VsU7uAic

fFTw/ItN/qFjWuLwhuMzJX4YPSjLa2DaWhqMDsMgQn
+9NLEsP1Wiq0VuWEDZ3PfdlHLY6ErSevOLg7jMqx6BgDAFRFHgcd0W6Pc4LW+R5bKKHUmXlwjXba78ZM

gyPttxUCAeWPXoVaNb0ILk78cneaO1pCjVMyOaYvNdAT1/y1cznoDrug3tuT7rIg0RUHHr+wxRl2mDQ9

48HIuPHgyQ7h2kZH2YiJz8a7OBbDVUfGF+rtaqyQMl
bnArv2Yor8n8sAdi9wynpej6rmq2Irse+pyBDY/6u1o/+nOrOv9M7Q6cZFZ+U5x05ZVuuHuTEIu0AvrE

E7subMsPZVfzvtqprvX7zlgju0iHw5cvGp6BlVOU+wus5IJWqIBrW6rh8c5PSimPF9WPgdzp8KYeDiOF

kDnbVmY7P8wwVg14wd5+SeIGN3WW48QfyszeJ5BX70
g5T4ShNpZl3vY4E6al+O2P8XxvAZXtwCFFa3QCR7nNklC9FZ2cGpJJnHpmqCRhwDPwm672FLs8N3ro6M

l+Y1moV10JdmCNDAdgHFnFMTc7Y5xY1Fr+WUJQ7ycFmNOyimnhzS64wha2pUJCtOWofS1amG2xRIvDUQ

65iDHmXvku35oi6BlJwVvIZla468lG4OzxCM2yNTa8
MkQEKf6dWOtpYIBbncnyEa0H3059U2Zdp1OL91VTL196vnzWS4GICh1QX4dwXUqiVn3gDm8uS1DFXQ4j

UbmydqeZcmu44o3MUPomRhx1zXFcGkdxvHjdPdP93JBU6VQDP7aV7kMHEClQ5lGZfP57FZciUzKVWsy8

4+izfc/4PPYHrfhnYDTgZMbvZCGHx6hdm6xfwLMqvN
FzEciC1pdklUQOw6Gf/1mS0doyb7hP0sA4qmWsyiLCLVSnD1/TqNz6OuraK9j9dEhRHIZxIPrKte8tL8

VJuXaI8uYKiv1vFiJ1HIXFQjSv1beDIVpnbdVT08fu6U3/qJPjsecjSOrpScKi2bKHiBCQoAPjPN/kbk

X54vdM4LDrOX2abrO6DU9OwqIbIeWBb+R4OE0dvkQ9
uTgHf7VN9GENJw0y1PUgm7047dTo+yB5GiC0Zh8/MTPfdNNsm8gGVE/K9aBb8lZ5Ym7upeGv3QqJB1/n

mz6WEDQsNMafLDA9dGrlu16SAqcidIlNOa+JYGfsBJxZkaA5R7r+Z8OnFtYVIlB5AjcwFco7QHe3drqR

aa9Z+frCyhUoU7VAdlmr17NjAJsFfoPzrEXrH10cyE
cIGGMpLvwFdSiTMQ5qm0F4w9xAX1ZkNX8k0ie30qJQ97ovug9GN0slbpZ/CJkwRQU7/XYsm35Tqm9m4i

Y5p7jnqagU5oR3gw0oiTCHugw0RB7xxlnYMjfNtVXnPjnm+Lbb8+Yu5oAMShlzfV5k0wEfNJLjEwaYD8

G42vkdFxMzDF2QHAX/f9Xs2NUUpGI4sOB66dZfRIEp
zFI1Gsh2ASMqmyVGHHWUj01vqbebLJErQCBh19Kbx4SPwO1NR0Xu/DCKK4RP08DGoMlguKPr11p7uIWr

ouelji4je1Bzgw+JAwvvoYKleP27bIVofB3sksQicDp1pMqwjm1/1ek2QaBkFf/7zzmPL1RanNJEohW6

/wogNfcIKocGOqVgsgNkVfktjKd+Bna2n64Flq5zXP
eJytyTxT+Yuo5Vvh+z6hG+7+dwBC/2kETvm+EDz4cUQaoWGIXKyz2HfrC9okICSVM5lAajaY+GwYeUJM

zs3H7NbdfqcMeUy9w3uJAi+D+yXTk+7kUDPVgdbwZLgWCQE/gQUS5q7zqlGcxJB/lyhdaAx9kWP/zOF7

hgH/8oPbgNXMytVpZXe/SUpUqbWf7YfxwWiXxFjpMy
N4pb6mLmBdL2Gfb30PvxFCqZSi0OEYhgf3grkgAwuVfV6zHU9M2YZYYYje/fUtthQL/ddhDhBe6uy4gU

UFmn6i4j7bQuopio/zO/J1cyF+80UF7/HhZFlzghgdYcZK4xVDkOoizi3XW7Ha6jx0lzHOzUKfsPJBk8

HdG43near7D+Y1VaHdSVLVfj8qCAdbSnKtYdH8MGH0
kVab4KMaHqOZCF/WNbeDFkTEFe3iLy+8waO5tYwumHqf5u6SpdUPcivBNF8GkqlPxf7q6eoNi+FrI0Tf

IOWIhFBBJaBNQEIICwTIjdcj5y48EmPN+uZT85C9nk+lYP9jH7XE8GwIpQH65PksuioYYA9nZdgduEp4

05Z/R7BMs8b6RMjk0ujy+xkrHVa5C60OVKaFyqfbo8
cxigtDBvUiJ/C/j/YhF7s3wxQrc0gJUtNB1SrbRsjuVKbpl/eXe6Gw11DQ684jysYku/80EFE08wfu5H

AazrhGY51Y0nsQM4ZXiOx8JHRbNnSN0VCiBs2sO7jqVKtY3WOMhK35Ye8j+ThkvncBJs/Exmbl1eKdz/

P4jCPNzygwiSDA0k06Ng0+1B+Sq+qxECw/+SmvXWNy
dMSeR5hz/H7IPvg2mZ/R09wBGlQHBq8A7M6MhpJ2gKHm6mkWZPM7JInd0tGZbv742vIfLVlq4sYmnZgP

0B5f4gF0/EJVyEuZn8gZ6Ais1zV61CmOV5AO3IJMHJNOtVtaCL+Eda6NbTh7lUS330GPnMJwKTk8dfQp

37lWd7x/BEDSsMgjqsFmOdIvxBCSaUL3r6N//rxJLK
oRZcHtxLdCHu0BAGqiBAyRBaWo7n54s7MBixTB/RDug/UCav55Vr6f5Pmgh1gQY1jJaXmMD/kr1k19cj

QaNOBDBgT4M6dfLY3Tfa0GqbIxEPIryHPQjTkg5dFCtpC7nXsqBH/iKA19gPhk67EKw2SmojsQMsaC61

hfVKFGfvX4hKJAlFDiYfOY/61NKWAstoDZAt20T61M
U1x1k0yq7sQ93cuhPCnmSfLVobQpEeG7iKsCa/7H2wd/hXRVWbklxq6X/VII6f9jY5RRrJJMMLQ8C6LG

7RClGbz6Q4qiR171b8FSWpUbrYY3cZEWwXFYbnRSmf8g/AUd24Up+lb/0x2D85bXP/616M5f7wiRazku

feR3gL27U44CREd8S7KvjMXonRqhRuJQidoP5F5Umo
07+bToSlA3M/+M/adHMNa1xkv+rkntLUXVLVbi9axwkaFhjf8fQ2qKaQwsdcy5fwFRxqgchzLY4o+g3I

HXpzLMTLC927tNds4XBLeYLrk9p2UAo67mlxaLu62WSmmGL43a7ix0XLMG+gdji4cR6ELAMEW/JhXvPr

p9R4Cnq73pXwC6sAPc06iJkyacghApqbsb/BmfWG8h
Gqz9Uy4gn4/DFY8Wp5+8/hsnAt2rTRMz+Vh+PD9xBTLW3msLVkTBMCshEOSx6Qn2ifSrmkaukW++P2rM

FDqRoYrv1g6WekaEntCQXpoSpfIY+o8qc3ZbtiQwwN+h8EOs8er66lokRKzn0gdom/hW3xhNtFJRKZ8h

zeTU2xbX3H3KrO+5NdscWoe/U0tSNA2swZVvY+LbAs
cDJRWDAGA9VxLka315mmAYmptTzvBSGwBcOxseoZi6+4ASiPSUsihwvGiquw5OQC4oZlGyelLl/NTjpZ

XJT0F76CXbGEjT69oR//bNb9BqO6ht8Cel1yJbCa+EhUFiu6wngJzbbgH1fdaUfXM3m881sy8qwOdo5V

hmGrekaoQigmzDWkCFaQQ7fUkcQPKnkfAa6qQDWI0v
OB4/GKdFxrM0O0F1FMYiOuQMw+TuZ1OTvgmjwIAQZ8bkFpaWLw+Ol2+4jb1qHWMNVBKKYcoYEN/KwPcu

ltJfeJyzcJ0mm8rgwmNnj5M6C/7Ikz9wYr93PPipumr/x4TjKbnthwKXYVmxTrANxnujNcXCO4l/YVhU

daPO5utG7/WFWvnjf/X2RQSWl+xNRHXCMStz4syPyv
QsLBeZnpqB/qCtWXd81gNTC5tEsAEox+1fYBGZ1/D5BfKxjQl9Vc1quzFnAc8dJMSydpDvieSYhKHt08

3CrdEs8A50urUkV4hDmDnJwZ+ZuF9P2QRSVgfsfSvf+aoTpOIWx6HA4U0bC8oYGE/OBMdmVQtq5oKUyf

ML9/uQP3R0Ef14OWIJG4tfT2TTCx/OTsK69f/3293A
H8fkMgm7xPkH5gyzuNYBO8pqHig0rMcnX090dkLOGtp0zSb9a7CEWve8xImT3WdeRbQWsWZ/tZ+L05EI

SjwAmAtPIr20YEOS87FQW6Iw+AtUzUACGr1ffw0qG916cG5jH4BjrSpuMarVkmp3s0atMXq+MFub9jS6

qMDxDDKfGYlKybxsRgVmQd0FIpM7MAgfzD5Vi7a9Ba
HwPG/wTBh4n6MjLj3Chbn/gjKGp0tNYU94x8ZtMmGOMWa6LeT3/BEqg3Km8yCqSmQiJEPxyo7fsiZ+qu

NF0RN402Fbw13WMLZEFJohQpcJr6cLe+NqaFkstvNzrkRVzn57EGB0+lr/uVXNTaKGuQ8YiA6oJ+E+y6

Odqn4Zn/Bwu1Pk6LzTuneUpLCETV0dnNeUasiANsdL
6DGEiX3K9fLSD/5g0BkapSsvom0z3pQXdrHbF1fesOGL9lXkFhaIjB5zD43Fi19qu0RW+c0ZpCoklBXd

3DSmXN5P+nEzxs9VP5XbLPqDb+LVQY9mRoQgdxjOyaL4VB6EVmbJwFiDfv3cQelJHr2OK3ChYa23YMiD

jAxet0NxnzBeebcvNLnjd6Me05J2pfAfJcpmrUZ+oo
8LRGo3WzdHt0nv1Gb+4bQEY1cXUhMSIYIScALzg5Pfc97CtvJCyYs1Ysx4/3zwP+HefZA6HTV5Z5BGRg

VHfTBf29rStG9IxkeEOX8+SnMQ4tmHMSyu0SQ2RIS5eytIUZ4TkzoQGh+se9NZmTce9otz9UzTIaKg4b

dsvacOIk7rjqdi5hm1u1u4dit8TXhio0aof6xIrg8G
Lqzg002lsqxFdvuQ3/VSy9c//utGLj/xfeL4gbPtJ6MSEKG54rZrYmn/4m5ysqtduZ0wJrhuKIzx/Aoc

8bfK836iSfNmsrQXwR7l9TRmiCvua94tAYEzgqGSkoq2uftJfaf0VwptL//RmUUYPdZceWrs4jCJhnRQ

L7pkJA44WvyZGvPWFNeQhBEHkTIXR6oIm3CkdFEdHU
3/9h37fVjtWhmhXJ8UP6sTqu9Rc/ww/UpzLnOlguePI3y1351cvgMIiuc2uVr5zmWaN2BfGEBXH/XHBO

YpVM6ApQBQyjbf4eOScg/SHpyYFyDyoeFPw8LZNrOXoZw8nDa3KFBL7JoWJjpfUoiFpP1yB+PQd0/tVP

teWgZprpDiAkVU3DXM7y6PRgERZNMkftDZK/8jtsaw
VuypKHt7r8OrmInDkSd9u0kjqb5VHBJ3/MlgBibMKzz11qZOGjsLlBv/jKWBBu8olVWjk32YbzI/34qX

DplUWnNNzRS409BW4GMAbJ6ml709jsrS0y2MZJJSlV7YgLaWECMLOoCBPDkli+N9qg4lWjHDLEwZncVw

E8UfL1CWP7f05RjPxCmopSWD8zsfsmdcj1sO1rnmnF
r57rhRF4dQu7fJ5Ikz+r/wjsooa7S3K/LF0vKZgTdHylv7EYJnmk9A6qJ0eermXetImD4hG2Ovi7lvZh

3hBOKvCSKSsZLkFqknO+whbf1+IEu864zbBtIWlMAAqgfSjvGpDM3w0bt5HcQetJcfZoL9X4DC3QBh7T

03kNLPEzD00zX4JYY9vfACxbwN9ghui4Lg2DZ7Vkm5
NEpVoWuoTpxWRDz273gI5UFu1qqg6ex+QrMTe+IW4wLVeVNrSty6heZ0trQMEKlEfzgC9ekL9Kjf0bYs

CedYt5y9NKdGx4xNBS+iSHz2AMn7wSV6b7p43wrX0r69U6Pdw41liXZgC8Z6Y0JSwNkL/uzPHibruwsZ

jd0rMP0uFpL6RdX/Rg6IvFD19HyUKscYpRIX7dIi8G
x0mgpDAPKFuDWSuvlkAqkYBL3GuBI6wwKsO5wWTeiITSeo0sey8pKgMiUTXeD5mXqsqhxXm6tgvXQgFD

oHOcktSubzzlC5/BVU90buYe47MNWh1A2KeRC9NnOK50XNVhV2GF6l+zAVuqsxoVTaD61MWrsj7b3hGO

mJeynnWBn6swWJYr304cA9Bb4YZGHXSvqmT9VICije
FLRxhKPtsODSYPFv/EBeEj0FrqIbq698g+5JOOOz+lwkPoYllymSMSJIaRv5SHJmfAT9Azt2onyZ+Y06

CFN3WbwVIUe5ooftqE7hO2csIBIYXuZYmshFVGDG8FZ3R+0eiVKTOo/NVPiKR3u3Ey8MRdwbrGWPVVWA

o1aZ+4o10OSt1jd76+mlOiPGL4XzO+Vb1Vgo+8u6LY
MA/d6eCvqgYCUAO6qqf5fRLfKWdID4pixDD8CYWL5Ifx1E5l2VM6F7W4Ay+SaOQ39xA8Oan/7Rnz2gNg

T3xJP/ZishBwn70Pvqu//MQJ6ZYs1sjxl5hFa1MQ3wn37puuBsGjgfBd+uOTRKFTQxFERUvBNVAOncUk

DEMaeOuu7t1Bsifi5Bkk8QFABGCrn8z8BO0qhRio5U
+Q5nahti9e/ASICbveAB2tPOow1/NvA6z2NPFwRDiwhxsyI5TpgTD9Q6ut5gxXYoHC1sjo/7I6KCaHZx

tpaCU5rxtjnWg1y00TzBnBCZQ35YA1GvXdxUiXM6CnJjzgb1JQQP7egpGJ5iIVHRggzowBmEPPrNF/kP

E7DS9ADq2aZbjGf6u307MDFIAQ5jHQzLTrC6hNrst9
VWs3CJ4KTrr5Fy2Uoe/sS/2XtQNY9Na/a5Tv05pFksmmy1VuF/5qN2bEWNDmDprN9XyG/cfA+LxbVg3t

XAJaPVyTT/kjSKmr+42JBvh9OvvXo1O8g9AffO6LscGWE2x0OdmiZ8IRfY09y+IlaAVr3nI3S931WQm/

CLGdsZXDem1PgaXrduAczEQFOCKEtvWJiZZalUGlKJ
BfnRk+8KWKOG+h+wOcodN70JaBBq/WakMMCVyqgLo3tQTndw4GwzE9N3TsAQUPKItJyw7ht3APcHoLQZ

Hy3qgQxtwSxpExUueYVULF8Mh7eVNlgBPs9aFJxbJYhK0gM7y8Vpv7GpMi/IUyR1HMIBOt1hxmfKdU8D

Bek1Q2MR36EbwIIJTP7cDOjLM02YYo0LsKhAiQRSrH
iOcTPZU9Vzaqu+0eVxMrANy08dSat/AKNiTeoCFQOy4oBGjrGIGOtDgc3/n/ZJW6BnLFuAnDux1LP3hL

7Aweznb+ZZUMK4nAI3AeLTm+k/ICKlyXCJ2sgDhkMgVviz4WWIQ+RnmKb43KZbwi/E9IaUWtM0NXuCkV

hxMfozdBM26vKMu5rh9DqeNGsFOhyAaDCfm8UPDFzT
+VKUwqX4ZgtwoXlQZfcbywGZekD1F5WCBlO8KzHF5djrGefb6sGv3ckyWHGbfwapQGWh7yjp7+QL0dJs

Q4v7YzLN7qvk0AT8M3qrz9tFwFszDzi/7ihFK255pFB9sArPfo/rqrV7RQH6UFg/WvT8mLYe4LTwu1M+

6pKRBGM1Uxj+wvw2u8CgN7qcclIkmHY/+HJ3ZLjKlA
/OfczjtoAthwkD9cErH48Q3Fo8D3AeBtp11PXfEZ1Mp6WIEDIe4hdSqD3kU2UBU6JodwBjNXipQygky2

c2tPO2iUL0q6S7K7TJiOrVz52v8taWGXNuZf50FsgGYZe4eyESS8HDEJg3pCEqr3W5n2gmkp7QKzZ9o+

+jPzZZx0qgix0O0HeXCBGKWN4HAD6NmxqVP35ChSbJ
nZuu7rSZfMCzymw5XeN+UhMes6whu3Z9q+JbKoOR0qbaRtRXn2qOurXM9gB5PFUABGIEGlV233m44rEU

uRUTuE9MT+Ttxmw9takwQaZ1LmovxAIvmDUMIZD/jBN46/EvWy1Uag30LoVyxb8gp9UDBR8TuFUP7igp

PcgcPays5EyrmkHHXxQaqBcIqpPEyK7LdwXBI3RTqL
SinXauX951Yt28iMbfDqm8805OWkF2rF5xcDKmQhnzNCz+2msJ1mxKj7dh10bLyNdDGds3JkBUa3zncm

Z5/gCgAsm+1nQwi3XLlsEfz46V10lkvn3CqtP7SGbuL8ZyrlWij1GFTN1NCN2hQOwttb+v8AIBC+LVXa

pAy8ESrDSJBDptnubdXPxhdxAcVBQ7ItSNbAMZqILQ
vKXFHrJEtamXSZc/6KawgdjDqEPfoKhdO6O0ruI2KBRbAX5mUa6+lPjyztvVwVOSK44fyycfwO9hAhXi

QRufkqJO+xui9qw9qtK/a2q6xIusyqsBZMM0gO0R67XnHUe1+AJic0+wBGmZlIcs1i4Aw6++sDgRCGlT

mJjHiiVUoO6+nb/uIVJRN8RWbAyH2Mjh10F5midxF7
rJuLQxtqhPrGeUZfp+54YdzJ4Wk6jtaJ37rzqnetCmAgVWBknVSw94AZXUGlTc0z5qINN4w63BWww0Km

u4t73jmisxawUoB2/T5udxStZUJUW0NYPRMr/mgBUPqj24FNBzEmj39Uc6+x/ETaZgBlFfvwEMzSItsy

E1dufrkE+KkazzCR3oppV/7MFtPF34pUGAB2uA9wjp
XR5kYVCddlCuV55YY2BYJ5kCuFMMRXQfZwzc9A2NBorB3GYR8bSWbaJIrPPb/Q94osrY05MR2gVBsx0D

X0z+/cRbPNrt8GdzTduwUSCp2qWQjuHW8tdIBn6zZEXV7tZrjkcarWNXATqE3i+S5+eidRnRdqWNEN89

YgXWtExVPEaUiugeweI5nsTTgiv46s31HHMQRo4z3n
lmjidZVXlgltq4AO6GwcPsQcITJxpSB8XjfONHUc/Jj+Taw9PjchbccaAJn3wsYCugNA7AXda29MgSIj

TSKMf0vkSPNWAt5pCmi7vVKpVAp8tR33CzOWiMeAPd1P4mbrYoCB7tqKVG+IycV4uXDwq2Hjz4uimHtu

GTt7cQQ5BMPKCV2zHm40hNgoEtTAq0Hmw4pHAw61FI
FYYMLDxXn5w50+SbB6sqjDSeHSbHQ6wLsjO8wd1SlxzpDX7Xk1l+fULhH1dgDyLsQwjmGcV9VmSWs2vH

j7ombFBvpJxL0LHo8/2pUH+uhHdBB6H+2zmVyx+CyW+/4BQF8p4URcWwwuCIs6ZnSXpbMqyNQQd0/LNM

9Rz10d9lDSDn8Bo6aSdG1NZyPOMR2dF3lLs0zYul0/
GX9Uz/TPdAKZS7/bMET7r4R+0fpvoVYyrRsA/uSZ+IxAG6l+/qX98/RQHrwU/tRQT1htNb/zbzQa3zJZ

zGc69CWT7iEw+hlqmj/uY7j3xjtp6dDFcWUDkTCxTroryKcG3b2ugvWV+PrtmGhIYiBlRkz3tXpcBTbT

0kT/VMV4eUmMVS5ucsxjWSy3I6G4L5AQUpcFzl+Ao5
KA5qvtxlGS56CrJe05+1Dgd+6RxuDLWRFMK8AcjVAvFJ2fYrOTAv1fibnunziJkCuNJtOpQ0EqtKcO6j

1akUEB2E2WWy4sLnTwA9m8wzV+PqKe05ZGUoPh0/4e4sgGPpwa7uPRIqQ42RmXu5XO/qufZ3eNlNscqF

HO6lV41Ej40Wvilz2sgcix9J/CHvUh8Dv7aQzSMuXI
IbqGKec5knlRm/M+N5xD/jdMAwwv318VpMSZ84v5CNr6hsHpAYNKUXO2CNQwfRfksncu5B4tzs3WaoEW

JBQJYeUe8IG1gfxIo/9QwC83NxUdKk9I7eSFsO5zk1EZTWJVHd7xfFIOJX4jyOQZ/8ACCsEtYinFYvai

G6Fh2R6xt/LkCel3YaK7sNinoOOU6DYi/ROCWphm3P
UeQjpCCkoftg2v/5kJWnqO4zb93G3nqe47PnaO4C2AD6mx3gKkioVk0T3nUIkl7YYuUvpWSLMCZ48qmG

omE33n6iURLB0faM0PVkCnSf90xLNO1hkt9q6HPvb5mqyxeuNxy1BIcP3OrY6FZAxiVGpVQ1m/77BC0G

46SwzwZhz86HgXe7XLldMTyD0xxZKF4AMIO4+S+U6a
l+i2eXX+qwuUBqWoZG0qqbrCytNTnSTHsjS9l+9SevR+uE+fxmqYlDhyOfzbmhCUczm1AXAK4QKs7yuO

OPuAvJ0Hzr2FaN0ordoA1gd19vUjXc88tFp57Mw0gGJp1gQwAmgz3pbvy+osU+O8aXnmCyiDR4VipA1e

/gvwfrsdDgTKHmaSMSp1ZZcOEAXHzdNpUCTlz+oqrJ
GIhP/GMjW/gS84+REvbCnvQc57WHy+o05OhuEjAa32Io+ipLLJ+2+ENR9nE9mDJg2xfYlSk0Y3rIcadn

++m//KrfU27hy63MsxrDJmp4OP1Wl0hdQwtsappyYPlQt+xvYVTc2mrqaGLFIFS5pX98PYt+088omT5R

9B5MNM76UcAUrr42ATsfj4rnseC0c98A9UctOWtBbf
fDQNXfZ+YyJqHB1/ZrYL4SjoVVE55QU2nBK3aw5e+EwkTNazKxn5R/7o9IR/+M+RCZ/bSFw7xEuiWvOI

R4cKNOMblH3HGeH/varwibRYG+H4trf45I9avs2qfA+P3BmwodZoeXFtBhmkW90XZ5zMT/3YUx7onHtk

YqOWZpfJ1x3Zt0sEseYb/s5tZaV5W5pFjZfyL2be3I
6r57OF026snS2aKrw3j5IIOx7Ssz3/2vPzZ+H3id27dP+NPsBbID/VJ2M/QAusZMg8NKGp6xCuFrGITF

nQeXn5Qen9rZPzr32dDq7SgWebjEpDjx0usUMY/ZIHalAxqzI57UBMiZy6Cxp1vS3eF/8KBDyz4+rWvs

Pt7aYa6lHL6ZAfDFA2SmKS9D6D5CMm8EctpRrNeID+
/4MOGneG4dnQwASZmd2dabDYcw7lsyRZi61cKiq1/pBVbggb0OWh2H+DswF3aXIuubmCzm3wacMqKBzo

SHj01b+D14831y5mvQNcJclMeCxv4y99b5ljzou/P5Ksl3z2tYIrYAE8SK4wMCj85vfYp7tkgucDMSHs

+WIRoQtUA9OdxXDUbqjbf2GU4xHejp++1IFZuOgvD6
MCLMDBZ1PXFejFEkk3QRxx/WzgughRspZrDrYl6Uhw326CxetVaM8PumXnM39iZRYO6ekGuzXSSxquhu

Qt3d/s4OkOOBpyJO8F2M8RNhTZke0FaJGtfk0bsvqWZHbg+roJwVToIWOux/+JzzjEMgizFPpkmeMGh7

CSx7XGX1UmPYe0HGzXOTK2R4AfZcVuqCq29bVD76yi
A1ADenLzNlzzzVdRHYpUFAzU/zkvZQ+Sy/Y+ai+di2Ve/zqJdQmsSq3U+NAOIO3vOAWp7Nwu2dhBzFGU

TLgUpIQCKe5ZFr7n2XRC+mu42bFl9h73dnXdrkJDkZ3xM9NmuE/k6/mdyeekNNVUSXS5yh7pGtRCiWea

Z1P8IvHSD2+8cTvtCSVtcn0ZFVbzpHkiT19iWfbAJ1
/x7ogHKJRPKuinx78bX3MIVBe/vcOI1cSsOZcBEC/buhTB4IxvFJZoUB91ffxMK20hZDqC7wFivazXku

8ob4oqnGJ9TsQZPjNjWSVGhwPaGfTxb/T0haMjTOt1cKwts5qtw3YCR73d7ObszPnac/wNcuJhpXPFlY

iTlKDxuzKytqJYdxiZo8Ca1b5eLy5TDO8hqyPNqs7j
MPxKwBHRWL22m/MKh+t2HXLCo5Id+YFxrR9l9CaN/FO3Fi5tA/o6+j7Q6rdAiKqUlKHiN/CqT8t3KErG

bzORbE27R0N7u/dEqzluAifeLVq7fBKCjbDNWk0F/BT8A/kWVscJgyUPmIv/5Oq/ysbLayTdjwo2zT4v

wNglBikc5wQJ793zZ8Fa2OnkrYGvLi1bTWlmeE/YK5
Lhu863EGpmSdJX/i7VXtjPWfovOIL38ocoBabyQc2fjkAgtMqGod0X4SLQD7PYYN/nb0lVqHze6AowV/

eiESYPWt2lpm0NF1nKpF+PPmCrMOeRHTUVVq7h8PY3QksdtRRUcd2HZzH/hkGjAkwWIFygaPxiJw91jE

NU/wtcFCgB0mGX79xjR8piW2gzs1bAgADR+VY9CWk1
DI03beiMgWgZOKReJRthsiM1CYzawpSa14cE6SENKvW1WszjMKaILp9ZmXwK8ECeg7cbjjoF/D8kSShs

UtMjR9Haa16V6h++OwWp56bGQX0P6OKYj6TdAFOAfdcQ3O5nbv5xh71IkpqO4P4dN8wk83ChMoZCAROR

DfNmeFj/zkhxhRCdcwwkmo8i20PBIEyraoVJ1fMVgH
9VhavcDOm6+NOiQIQ/vh3dCBxcvAdJj0Yf9lJV2fxa2YRgbPSbWIheYNwCgmv5qcOJP9kQvs0CdZ0WD5

7xVu4C+tpuxst3wlMNtXS+cYwwDm1PCd2EiXFEbnjFJJFmK1BS+LS4QlbwN66hCPT3+71ISrPXS0gfcH

Z4WrTXSlkL9nq2gYoNA/l0XUt/dGBqxBkrtFqMpcUF
arBnNMzuhPhUzC5vgSZ+fc4vgV+IPmwo/O5IUaSMU0NrqjY9GhecxCX/5dGqFFVGfEqT8r1ERrH+zbc+

vEvIOa/SW28O+5RKfLbQqSBo6v4dsY9Cjy8AmjTNC26E/uIYqUJZlqQe9ZJnhX4dLIhCkuXjkHYkR/v/

z65B7/poeypdNYilJOooXr3HlbNmNQ8s808UoNiPpi
Blm2B4LwKAPwMCza77ufL5gRsBup1HpYtZhCmMu2VVzd11FomYmz3ojjsz7znYWX9XjMZhBlj6AzxoYJ

mcw7q99HvwkJYs0jk0dToB4ISi3G8yEK/Ct9keBEShPtXDHOxw2k74sFAINSBe0ZvHaqlB9pZ7dQJg/q

3ldKAoB4qk3g0OC+6nwq/fTiwGqwlExSbM7dwY6Uuh
qPBgeDVIaFfQvXnoR+RMq+xKTf+ml1OC+Ei0Jpxk7jHlickI6wX8UCJ//E5NtsknCYKTsrJ1VxqsCDAX

jYTn0871VP9TqvZVJmg828W4qd2skJ60IhWdOt9KnHya7oY3xIACGDjrku31BdwD+9u+1TRCYWa8sqfX

uhJmpLGWlgEAq0yU2NUXStwhL9z1RpN0Ue2I4Si70M
5iq7cMaL+QKZD5A3x4ysPIJxJTneVJFt18ODLBkfgOURrP/gZxPw4+HdC45vfMyiL+D6y7PRaS/rXGBb

gfVcPhzgrA4e1jQFl1EyYsLtIfEQzibR+/Auy+kHKxuYgma0567alwPMyGes3+nxvmJ5e5pI+GjBupau

bK3p57BNDKKDyPw66j/jY8MkklAvu25eXFWsNL18EQ
Ya15GUOY+Dxo3VrQM25P32LXQPmmnvn62f1+hyf6F3kvjoiWJmfh1isbHCLpDnYYefVSja+L9Yw2Oe3l

zZLDFVEUdqtRDCHopmC01NOmhtbZTMvMukPVhRo6c1RnXXN4OYvq+0ZnjT/FHxVxXZbVCbB+H7v2Izhr

7tD2Qa8OQB3vBME4kU/iYwHMeBjIxmlZmTQ7NWwtkA
r6K0XtD9E9nGi7Ks1+vqYUer/vE51CmRRm+psii+59jqsrXSuC4kgzsGZ6R8khBPoTWoDb6BhPY2J79d

QH5Mw6plS5j/sKkH8Uh1Oiq+j3uuDw628LewcfkJfEX73gEtQQFs9wVdQ5BeG9UdCwpSYLTMcYgLCQMZ

BMVAakQaF0O46TSoLuuQzAPZEm1T1F4gnZTW7HJHm1
vizD0l6qFYoswQh2bJ6+ldigw6nQ7t/3ciOzHCfC+iav0GVNlkuInV9sCzDpxvTYsvIKRf9KOEDihsPO

psikpjxgh3GJe5fDC4gjUuPi55SqsPEUgKgcN7kyAifJJAzGZ1TSwv+6rG2dhVn97Y3C1zW181M6zP4W

dnwpPeQ93DnoaAXl/v7JHmqUIrI+gRQJ6CowV0oH+L
P9zuK58SlULalpIthaljRiH3C/5PfeG/KL+FuY9Ud4w+JK8G1DNn0HII6EcA/Owv5aobBubnBvGRgckB

3IQHtTvhCk03g9nTHBpZZ2t5ruizkxIeUHoI8wk7N+oHFa0gk64Gl3quvkGZ0Xrmcskg3A5r5pUbQ9BT

QrQv6zryUWmIoG3WrTbxiVR0GOAcEEdkYt11OZbuLY
1kb1ewZBLW8Uy5IbvrO/StPnhk5JgyJ8loFfCDlUFH2haSm1/7zxb/EokczOoG/2DVKrOKM65cZAutO5

7tEyQgsIYqdn8SkOOMnStETmwQjORmp5ueVX0T/A/O85r1P9fWQqsL2bR4iswWk1/lwlglULvqo6i+Gg

suQ8TwTePmFqseqcdQeFXWgWAWKpZJ7bhvzC6jA3Jf
/Nc7HaQoRZJdgbyDBLHgx+ecjcFk1kdjgpvc9XgI5AAy9GRbOYRdL3ePwj1WBE5d4EcqpqQfBgsIQ2Ek

nZh8mRtKQsPP7+Lc7gLkMilHyo3P3ocadURHLRJLFfXb9QcG8s61YsQAAJgw/dK6toRbcF/74t67s4Ei

g7vYM9pdUJth1vR+xANjdU5B9UN9cUMb4Sy8qSEVHt
0hbTy/q5CvvMa4fLpuKd+ty79/h0WSjG396U1FjehWRDaETulhZ/2uBHex4i+8t1qehBv8rN7d2LpgnK

d+s9c1fEFj0Km0aq6jlGQDzp/4689NE4wCP2aMbV+Jj9nyIsvhTVFMFw23DS2wjpdddtqUZeTE+qPFI4

XfPXmkvxOHX/BStVTdvz9LAvV1EjAhQsYbPh2CXYIQ
hAcXZ+1odAiDck4e/rw/FUKPIGJp8LoaA98hHq6oxmSRV5BwLB47kjD2be3Ed8XG9x6Qf7tgTSAcuLGk

4P45P3xPocCgwuLqeRmdgTyt/59yZa5IHUEaiooavIn6PtKl5yen6yENRT2Q+1xnvi6fwbyMliyXZT1d

wcYuvKrUaZbRV8K7xgEMiCbATqA82wh7Vip84L592U
YPeWJuHt/rePZVRTXugsR4egerslIlpn+JICp3MKrsk+Mlrqtuo+XrZ8/VQWWqM9FHULIJPyF1yT5RMQ

Jb8fi6YJV8/IUGLWYh0m4AimUcDNGoqThWRg4IKm0V6FHrr4P4JQ9ViWYO0N8Y7Frob5TgLmu5SG2fLl

RqYE7pguMh9E+nH58qoB4KA6dyNgZbFyawaa36mbzv
ATSTGANDvqzFa73ktVFyUkjCC0JyYfJbJQ1KoZJNbVYoVPYg29O1eFDPaAXiHm/OvCtB1f5UKKf9H/am

pJrYQzZ3mDaPHaWp1Ljt963OtxDrXLDOu+ynhRc7orQP/07f8I8WnvlETKEoqraokVjDOWssOTNldlMF

FRcGUIVPEW8rD0HqYFODdojFISKPHS/tL2poID4HQo
j+N1GqXGAjSz8/oH7VQ7Q2prxgSvm5sYy/wXZJQuZ5KSBEjjVpuFyPraVeO4OecRYTFklpBJUFbsbj2P

4k5E1+LWl20GYi9L+KCX4RbKWBmHcsXyaJRXHN+A/bJtwjWMVli3eznXC7hhZhCWFFCkkPRne3A0Umec

AThX7uEs/6gE5OwcQfkEd/wpYX8wBlrGHxP/y5OPyh
vNAwHPkogemA5Ul33z1gWL7ErqcsSy+tbH1SAO2Igyvoha/BYoVZRfYzbt5IAOQGrFavK6OHXIYpMhA8

707F5htBw/rrWSy6PPzuzHuSbQOk1e94aeyKXbHAKig/ergovV2aKhHI3ZGyJ5S8EegMKtGgts+1o3Zd

Bb1AItNQMh1RrTyyx1woM1nxK9XXV+cOmoEOaxWBv6
qJAlvh8JeirVAk/ibaBw6bIobyA9nd54SziuBhPzRL0bfTm1QjLMFI3p+jvfWJ+2tBP6MCd36zKsiGOs

PE3krRkZB4xRtBj5+zy3MSai0UFfq2W2Rmn7/T0G9e7LiJLFSbbLRSTUkPuZ5p9h8IynYqWPb9dDBHUY

6YlDjwb9Bpm0XMthd/BHO/rjpnKJmxFRo6W7sL/bG8
ihu6Jcy6K+2sOds+NsyszbnqfBMcrcDAxCzdxie8/NXUfzgmnj/qYLHQFEuDj2lguBKEDAobVi3mHWfP

/XCcSn7S/pYrmjSx4W4ojYddE9BBI8o8LYTJUEWBAsegbYCrpqsI+fjnkFnv2FzrtLTtZz0wmIEx14Ek

upF3g0NM/evS8AxGpx+qNZwbNAhKnviZM1slnDElfU
zbQovrGlQvAw8mYb3M71Kgu19h7wTEWpkhMiKHo2YmvBVRGIHbDvNeufSfICiQCG3TYVXwJB8ltbjVjV

el/Q/LoBBUX9UMsQs+/e9cpFT/BfUsMCf6mv24xLNlL7g8zHlcezajhpjv+EVhzXiiw91tfxAm3aWPzv

h493YXNzHYSO1fWDxpeH+Z6qAXYDbBA44PoDPHcRXR
sz939hqgAdsCJGwv57FzvpBOp4ow5DeELxGygMrViXZX2HFw4IrGhA2dSQVTNytDUMiPqPD7l46MBJBY

3jSBwyxAF6NeA57xh6DE02ARVOqholA3yQsYXzQercVHZEma9TsFl9D1wH5xkWbzsPWyDuEHerowD1or

tdUPAmqiEhEOEZR43BEqVC0dHDsJlrguOhNCPSQQAZ
HxRYUs1KLoigRCGxOaHBFK4BtiZ4HcRvGRgJqfKRMHyBs/9flPcSKR5uk53tEa2UgjfVPkma/+NGlwG3

QE+tljSBx+OJKZ13QOod0lpo5DsC1SCfLg3JoLfD7RtgsbOQ8ZnZRVOnKKkoGohrqmbxFAW+dVYVBv/4

u894JvX3JfvrwAkGWnnb7a5gx8t7VqCvk6fhiAQNQ9
CD6O+IDDI6QKlBG1XrtuH2O536PjOKE8wGxdBHjP6CMaM000SiyrRky0zESWtBn37MD+JmHghggIejCJ

m2Oqg30sfyrA+5fd168cDBmd+KS+cSbsZL8ys2j7kK+K0LZfxdma48pEsi/C9WU7oPnJdswm1G3wbBGB

X00YVz3z1iyX37J/8bqCMeuSnPCifZftN7Kd1SvilX
4ODPmjBq6NkG4YE051aBaMt32zlOK3YXTfKGV94876emoWgoq/w/Q8LZnjOGp62GzkxUqpytbfPJELBk

HaDk7O0+5yPxWt0ZijxMiv6b23qZPlAn5BX7RAhmiLtUc3pmo+vGzg165fuoZUpxm85MaMdUfIhYMIT8

9/7wEhyu7PnsJFbDT6SfUFg374GHxYi7Mce0682xaS
HtzXKyxK5fdnw5Nrb3x997an5BXZQbxKSFVl1ILixlfgxRDeGfuw7DW+VKGPc+/Cu+UoSkQl8f8XnVUi

TWmsdhcekfrsoMpUESqjQI+YwbdT7wNHoVILNNUk/iJaw0y3U3zQtwT3DIncKnRPYvy0KGmoboguokso

xBq/w7O/Vuaxelwrg7LZPISc0TTFyexpt1/kbLet2m
oupiFeirUO6MqKFQtWbT7D3fIwP1WxZst3SeqzTCp8s397mp8TB3MTk5o/rKyBhpAKEIWu5tcPlYt7IB

qLYgXydGhFrOw9XBoiC/+frdu1VBaSozbreyqQ4dwsMrfuxs1UuMo2TngRMI4ZkRAS7ZklMHj7yQfWTb

dxy9gw84xfHOFPh2gHq6XTGbOG1h7OS+gtoi7X5XGb
aYZrhK7YQg9owK0MdPXX79GNkzWMIPD7HsL3H0/JzyfRBl3/yYGkXS3Gx3NNpwWV7f5R8756hQrPfIyl

6CrKIJyA8uIAWc6SBaudeCdTcNN/JUTOcPrCzMrQZnR/zRkaMTSSQeKEIcOW8yHeDRi1wwFF05teBr/m

ayOjg+4vPAcc+wF1qeit6dBdrCIMIgKdDcfIHqCg6z
l43wbb6wGndeIZsrqZGyMqfZCV/OtoVKVpmZrfT+orTAWaVq25mgZj5fXsrW13cGix3SWlkc3Xl4pm2v

fjzc7VK7OIPccKeCKl2tbv1rC3c9o0TwXBb+aNvs59z+yq/Yv2GSkBYFaPA6bQjlsGjBBcTcwoCi8EiM

IPgM/+clLr37BGb5h70p9G3M1dkGLcjFCY7v/TmIRc
RizXX4w9cRJJMLzP68/7U/lyZkRltGvOB0p4islYrJjBKIp/z0eC9TzTQGdyRLqSfeX8pynWYZTnG3nJ

kFEf38XKe9Sotgw9S0sXozWGbD7y/84h6ydbn2kyg5AH5w/Nh6X409uuSAx7jn/6vybyZDCWxkPGW1kw

8zM5szQfRhn+K7MsatxWMtF+o1xtqqzAdDU+UQ4jtb
TI9U62pFDc8X0c8lvvnEMItxUSiYiQ+zoUR/rrkAKFXjhR57As3i7kYFD8YMfcW+CirAg26G+A+KXaNR

64xdI7jBcm6+yzW0vperq6VdtDCZCJy0kmmz9gVtGZLSxgsHGu+Tt/VCj+xpqFSIcmzdPE0hu563dTXd

7VBpK03dqyO+MXGVgXcWwuWLzE1WulO9DSklAbeLlt
EXC83+uW11nHwtd+PFw6A9KQdOL4ZL0lQjLG6QnbOhRSWRC9pqgs+/PYa5s/6mv7phnhjVS9IQewulru

PCPm64O+PERcA8QEtfyKCWYskxfzu3eAS7wL15WTIqpfXi5GU8raqQEQTupTWrWy2OSVqC0aG6L/abde

rpGoTaa9vILU4PlQh/IvlMfE0ku/1OecTq9rlHp1Ay
aSK/XBdPbQGllpXB9XZzvXJy6xFG6cX1cqLO6i2Ls8Bjt+XMabLq18SOpHgWN/qIvsNucYpta2IFO42V

+cg0Iq82+8pdow7pT6LCiyr1ZQbU9eJK2W/VWAij10+wfF8R69Eek6rQKi8limjhF4ofgsuFwdBqPHuV

8M36Ww4Lbi//sX7PFp2Y8RZDZCkvxzjIPf96KFkwZR
WpszJZqKA133OirkNDuQzQ9XI+XZu/+Gwjt8mSQE+0KKuBm6tE2ByuKsEuv1BcIlyjxaf1I9+S8uyNlI

kDj/wSyrc5MxWT7VWo//L90I4Cjp29xCNiERFA0j9zU2cOvWxStmt9iVaIo120WnZLdBnklyFBWZcV6R

MusNKF3A7Vv48bBChAjEs/ns/yDvrYLi+uKgwSEQCO
1aJPQD0URTUNvMPGYbGMVqdZu3EZGiUJjy2yR6ZmTagEETLZTCKEfXjes/79ra9nprpylb8jyhlt/dul

Wnu3/tMDSrWPbhIb3TJnYPLk573Y3jRmrZiUMK89+Wgwq8R+m4/QIHJ5W8z6He4ErEIw2Y0DmflEW4lS

rmfanWj1w7hfyYzGeIerDKJ5OR4RC/ZpbAN1ZJnNJD
KDCNycPYuYXKx3tDVJw9/3yg+s8JTVCxuiUwc4av+AuNejTLIMMOATE9bJ+Z6Jo8U2bzQrg2QoTYsRjW

75aZmx803L3agTl9rzyu71AP5NOY08T5LhYz9fhHaL//ITG2gOi3+A3+pgg5P+b/zm0adxU/FIAm1Ehf

BODB5hY+4E6WS6Auv14gjIKwyLJWjaV+Wqr9pBWa4x
DmoS3m7Zmruphf1h0xZXRp4TuQls3wPW6lEHIbwkH25ndg0oeodFC81dCLN1j+Wl+kZgXeM73AGb9LAr

2jfEsT2j65e67vqyPltbxmcGQdJ3Uwhc/pj+tlhfXY5461aUOlCuHd+ViRBjozGimQTmu1AEbX9bvqpI

BqeMO33NhGEyueSdrc04obE/3RTN9uU6Ev/+IEHbYd
4KGtyRdsamdqtapYUvjw3ogMIuAh+Ec8g4hkRl71/dZaIiQvKtPak2TjR84yqHc9TfqbBidfnJq6ylhb

iw06Kz3egaOFqwDT+/hYNaNkTd1vtNawbdhOqufJtGbZn+psWD6vRGE4fWt0oDpdmrx7AK764bn5nPbg

O77zI9t4XFlu1xka6e0KEfkdjPru+R0MzdovnHJ4b9
5zehOm8+F96BIvVB56lax5vZotBcGHvUQVLlcYnhkrNgA53SaSi44NPX0w8v0Ia7rRs/TTl+2kWNl9/k

MkjgTvUhTcdGiaVdahWKEjx3g+O1X2r2l1uKKA3XUa4bhLhpQOxsQP72Z79/FDHoaBUSZmxQzBpU41Vj

f3RP5BWLdNpuu3StxvAuGwe8u1s9yJiUiCA2DN+Yuy
FGK2u9IvOv7Xc/3mi9Pq9sEbppk25DBmgIY0keoJXWj5TvSjNUJwA0XvwcZfcuVobxvpXY2VFo5Gj4YM

Hob46Req2w6aowtgthRTW/M7wr2f2Y3L3QEJEdTzYRTyrGpA5eG5wFQINmB76xiJ+nrQep0gA3e/MwGL

DKYKX8Zzanf3+zg//GTLX78sREzr4sqsSU+6XiTOXV
c66IBcj7fd1FuoY/VbyQZ4UKRaU/gDOc9h2WcJL7sR/NLwYqj43os7dbA6YeWsUlrNI3s5dAxruD/feS

vdU+4lyKImv79G8/FY9r/nnIMe89SFDHymNGlxpxACd+cofY2tM7tHsJxZ5h2IZeOEikQ+Gvupgp61Wy

FVF6XRBk0/PqztJqfU6OlTGfjzTsi4QDaCfQmDF0rN
6jScZb9c269Y4d20Remt8gFoN4wY18adCE8RXdkrIvBDYg1e0ElMibNd7dd4c2RV4fdztn9Xtq84HRE/

tms/aSJ3tBVXkSmIz/6D2fF/uqdfREfmAi228P5qotaHNoAkGhfbA8bqIClUDL37WnA82ep89Ijcd4rn

j2fn06POLxaQVVD286lo3PcR1ajC4ytpwHlh5qdUnb
O+20jhqKoXoUQaL0NVpRgjn3w/SEn4J1yjijOdcMqELb6pMzhrtTZDWrukISaUsDKXykLIOlWjRWN7Nx

2rlPhPsyD3JBwBnljQZRXXOAgFovx7DA+51zDT0lL9DgbCT+dr8aRzaPSmt95/m7TETFHj6VaetvKpNb

ePkDc9vD+MzBCRAs8bUNCaTJTel0k9MWRk5CzpJV9q
NW8j8Qge7zAEJ8Z/IBWjyXcI1rliq/dhsRkV2lfyV7Jhd/c14wE2y82u6F2H1m3vrhVqeHKeiip4AjzI

dMMdp7Bw7hfyVbE9Gzw4x3fc2b10NkNkeaYayNwlNIMI1/Uafrmc7c0cQVTnVPKbcMU+e1UfdBdLuWWp

/JLnRzBpLrUewa9bpotwOkH0t0UaMpdWIfkGbkGhH0
WB8DQ4L2/u0eGg1KpMcdjZ0jrAXMiqrR15G0mjuEPZsFjxVvQsnRz/d1EA9WzRhD297SGpu8uIF/d4Vy

GrbPrx7hCeGGjKyB9SA4eqvgqzjVIT9MBxq3HA4KFP1lVw8tDrC80ITF82eiZOcq4upVyqafaGijE74J

uDYxc9+anOvrqLkoshVjI8tZJRmDER3B/6kkbkozz7
DdzpWxVa4yH492IBlFScMPLeGFF2Y7hrsVE9AbqPDqpjxRGiewurKEZp22vObesCFwpkbr8jnBvPDzOC

qQoMVw0ZS05cBC45UMNtgr0aI0x13Bq889DNKqWsMNocdyKP7J0a4zmqo2CbFvQ2IE0MkWIHkQbRWgRP

CNoXJGtGlxJHIZOQDCuaOQOIczMT5dKMFxPiqE1HsL
JVfiYoExCJSR9+RVzCNirXq3V4wYgShQMJg65lLmBtyTDNa4yWLN5aCA6yOJMxoieYM7X1s141c0hU5z

w/QgrYcAg3rSEvIByuqohMCYWA0a4hUUJhjiItpNVu1tt32C0eEnc7DhPie6PkPCcAWgM0qrGv1OktPP

/O3YJI4UJe7lmZzhIQVcH8GziPLn40QtLqeVhtcCo6
Shv9g1d6q9app1oCRIr+pDaWE6dThleMVMTPyR2gmAUEC+ry3UagAa260Ge9PSPlG+blORxH7GtllJEZ

dFxXzYdtNoUM2bgk8yZ8fv7BUg8ibAw7nbgFzphYtv3Ug6XaJ0lf74/xPJq/keG5SnS/AV6KA5ZdUNFK

o9iB+GQHp928gWqmWKLARtiis1fQgknqXmmAl+JaQh
YfPe95/DMuhLy1lyqHebwegzwNWKRQbWAqLY69AU3v1YkiDbZmWaVz9IX6lLPAPVfBV7SiPjJK65iqAy

Vbn3wFxZMi79gom9WCNdzFgklt/wAve/5KX98p4FncJgqMm0lnT3IaCpGWJMxgjQnnGdTPZD29xMb3v6

DvH+QdfApmru72c/2q9Dm5V5JjN6bN0ZB4aJc84+a4
+oeVO8/NMWmsia5Ns5HpwCslNAUTbfImGrt9HVpOCSn7e091UwkzkMG4A+Wj6plukXL486M21+IqLZ/G

VXGAsopoOM6YWO30gzaUAQkJ2m6C1hrkVYiNC5RE+pUtcfzmbnmVoUgaYqQsRfDkzfzEXs+Zo9s/wO/X

u3+4tPUVFjxdtkLEGiqeU6/E1eJHPUuUr7W/3jvQWL
YkgGT0Jbt8TyQU9bZe8qYfgJqu4UQDGni8ZGTN4P71Pa08tVAgeD/Qqf6vLksQU9kjh6rQ1cuANteukk

A7sDEitzCQjBnoafjBVEvv5l9z6wHz2ZRwSVJCBfmTg7JtRnztaVPhILibahbYhoxhnotSY+FWG4ClE/

SadoCZ+tB1hTG5S/qsaBHslmSx6XIn/s9XIxbRqhte
JDmoZMx+u5pJhteJUBT3TNhZCx0TZFxtC31MCDnhe5FnuciooSwwG5g3w/qxIc5PQ+P3mMoRcF+tE+j4

Otro2iPRdr1Qt34NNmjqCid7L2UKeOT7VDJZ3XG5fH0QTR3Il6j89PWqr+Eu+GUxAnXdZPBeHHqLe3rt

Z+AlSabVO81sb881M/VHj03f+dWzDxmHy9F2SS12Cm
uu5NiwSOdCaa/61HOLtYZLdCQDXvoPgGsTupFp+odDtm2KiA4jPw+MPas03bGDYScc9fLG0hn0HICIMW

6gzW/8gqhEQOA1StSfB7n0zMgd8QrXSb+oY9r73m+MwtRATkXyCPmNGy/B5AJa0QzLzhh5YN40njBpx0

CAGAamhZyxq5tIaPQxUCYhvvsYAPSyHYjDG76gZLIv
A8ZJ4ybpgJ1Jl5Bna8OZmrdMP6jwCHKu0SOtvA/I7HMvvqd6oSfs45pGJE4B8nqC4xk8hV4Q+9idbOda

sTkPSVfXbVOo7j6Xhwifsi3Qxym3mnv2TaZ5ZL+dbOWGlt9Drxan7zjC480ZMEJWiv5lujaupnJwKqTi

X468NmeJJeIT9symAI/NaIpVSk4dpklax4F0xSMjf0
fjW8zJuXSI+tAsr8WPckkroJJE6jZfWQjw6Fv3NhCLcjugdxcKOtqARMuG1v19P16OLe3+qh0ii5OiOV

v/RLqhcNMOE0lz4423k6uZhTTGeF9R72xUg3Q3ri/22U30nCet8e8qANd9ySoMhWSMZuC0WY1zjO3M9z

eD9VqS75bq8SN/bsujMeo+ItsmwZqbjNGei7ajCqMQ
STawHfkEB5i/S2QVAM1uC/p1iL27rxpEUfn+nT8LTNtjZ1K9tphka38LZyR/zbulu5mjQl9S53Y6jpGI

9a1ZwKFjxykF14qhr+KUpTBMug2wtF+RO/JIKZbvkexLVJ+RDzaXTEgc6Gqogxgo6gWzzMaH5tX4crtx

c3U8Ab864jk7IB4ulbSzfxbsgxlvZk8SWVEzGX3L+e
4pQDOvLnBam/5fPMrk/+5i5bR+6Yed8Tn6hXrEBweGQoKZF+8KCOhXNaD7IhC7LcwYFJJ7xUi68ptgpS

8AfvIITnRyO6PUd1H51KLdY2WIZNx1BeIzKw6XoqubfK4ZNOV6wpW0WiFvNEsJaDIaUEMMr5F4MM1rKy

ivDEcOsMfFbIjUD+1UyMX4ZMXDUieM9HORtUF9oLBa
6fHcaeWtHFQXGx+AdI7JzZemi/E+KeVMHVffgkiW4MN0cYsp3CqizjvB35QDUxAxl2Qf91xTzybqHU/G

2y5mZzvMM/i/8QkS5Al+dFjcsdg5iqQhVeq/bCi59VDt0lyhlqVpXd7Fm9yMgHVOkBgEPtoXPi9JTdsQ

3MmvDI4PbbJi66QVs4dWSLx/SEXxapnL/BxXTd174a
Ntn2+Odu+kn0azlTtpIs/xS7gpZgh3x63YguYw0p0zS3I4nKBHnDMI/CsYfSOAoPNPD8kWx1o3BIqu9l

bCJ4WsMienpv8S30ijvgfxLslE3fWBan37uk355EBIWBxz6nlgjIfltBqU9LfUtWlMOqpJEPA47oYfGI

y69EN7DcIRHTn1VpGyqd0SClK8SCxhJfzDmJ+HyL0O
TW0UkghwuRSzAW3eMPTJG/L7T0Eh1uGVEANsq0NOx5DTslIR30d4gydFWKfQic+8aRb9guw7ftXn2RIt

CAGZTwxEa0vJ4Q2c90Dxksse6n+n9dQ80jh6qYaRdEtrIISbbxwFDhGx+7bFiRpn2zd8Fh3BraDVrblo

OrEVRLnu1sp1mNTxXSQiNzy2E9l2z9X1mPAPjV/djR
z7dXAdcdx1AsDtebe0pIaftMq59i26cimtkIiIaY7QF7/KIk3TYNE5n5J4R6xNVYZ346r8gG+Pe/7FYJ

1k5IMygeA78UlN1qttk1KQGN71TRzGu3wNCC7k82XTk8W4uNYIBv9hu5d4V2BPyp5NiM3Q7JlGzQ8UH6

FzpFNYFXoqRZo/g59XGryYoI1NbQVOMpWCcqSGbd0S
dYXc6SlmYyndY413zqeeyjzkrnzJFcLNYcNBNIKPnYrJaB+z/fvOrnRhDXoE7xK1myTj8e95XtCAjDgx

xjmj78nGDyPzeuASl2fXHuVfzN8Hnlv+WmqtdfQ8BTWZtpDKTdpBTdKqN2AijY+0nLGUsnKagsT13Xuz

3qRNzS+58jklye/eVR5Xd7d4kgZst8wWqhhu7ztD1a
2fuP93j7vJhDeiWdu59Q0UGCylxWUlPAjEyNE0bnOrjdP2RuvqYX5M6YyXjEGnPKRDyP73yCVPw/Ii/+

bE2jaNk/NtQozPLEfnSjzcwdOR3JQT3ZNgviaAJfRWCnRnSCwB/CVUUg7y8Ebe+n4vjILHtBjkzaTw0N

30WSIchUpRemgb31qTbEpT7PgCoVc4BOAvVQ6w5AIt
UvhpWa1DUhWWIp9GbizUIHO/u8SIYoaU5jka2VxwTR3X75kS4VyPCQwJCqTM9sGL0RDMuyfhW81sO/zO

ZOslb79uzKjETvgl9JiWKoWNrlEfuAdN2yKv384eJqPtxNb7AKhlc5GretfjfhDSEZvuKn/fzGntT+9f

sGoMdzVVG9GR3TtDVA0vZq7yqQUB51nc7lbe1QKK9m
8NAKfWyKGwXch50fRnGU/1cw2s6uD6sAcwjk9EvMmrFVsTc0NdQlBDgkNbMhJF3TpExWDozs4SPxO+NX

0FYMqzpSoFZrKdcze8pkvFJ8AK8qI5BzNBmBB9GFGCTQuLvv66oEQyfOxlLkxrWlYEB41Dgq4gAbcMKb

27cu5K88dHFSTSd9WpaOn+wypvF6FIGDjwkZOQyhpV
IGfTVcv4ieUsNrHCRS2weF981TRt4Ra8IGWv88EkZGP5wbNf/7juF2/ZjQh2vQHXs6S5qG01YB5fKF1d

hRiLOGMPmIGqOAlKm3HwkB5EBx8+wPYFSuC6A/HqyvOEMHBY5jz/JCtn4JF+AaPusoyc/GYHBeJ5eAiQ

g1Vc+oFBO3XOFimG5ZSntVWD7ZuccSKOutl7dEdcJy
sMPg6Eja5F68gf5FH45LkAHsEV37+JrG0yL9Bs5nS43QnbweYpWizG0bMDMrUruNrNirg20j9NGzn+tY

TzOFG63TylKVcgj1qcPC++age8sG32dUNbCyMgqQKOLnxCnnx51DSg/7KnU/qpxD7OaSOIgbdSXVNomz

9u8atEMT21EHwlsNffvhbqxvDV518RdDTQhy6ZcPso
wUpKN1VjWfEVXtrmO3xgNHCkSslL6CHGghUKdtUE8H2odmbN+BOMz4DH0BhkW8CoVNFjH0Ou6g6XoC8H

/Njs4wjQlh78FRtNCH/IxSjCmPzjz0gRZDmX3e1yJkCa564GG8MxAO1In+X3zJJzwRw1WiuIZfboJEBA

Z1UWwinNrhCvJZi08C9xqHIbqdfR4sQUp7KqqMghMr
hqb7Ep4AhfRfL2wCGRlwJCgGoPPTnomhtDoS/ZD7TMKWjQkP2jtvobSFxfumcuMJByALr7gAeisUnAGz

RUmUxYdGW8uNOm6KaTVPXTWdZ1qW3RQuh7bICrRUvOYwZwP1872WCz0FkBts29yayCEGUkr5p1xCGnT1

R2vHaWT4anj/Of7jbMAct1vSyVBWBGCO/7HG0AzlKC
nGwtpCf6lMycsks9T88zypNHYcpAg14PBahLWxeCaBB916C867/ZXbEEdMJ1ZiLN13ZKl37nVox2BnxI

s83LmDy+QpUvjNcDb1g5uMPY8d8jKMhy5QhkxCYKEh0jG+ySUAS+RwFLO3K3tVh5gKnZAHan3Pt2DLMQ

Fe1DJiwgwS3jmUt8Ht1r4LZGszQLV+jqCduRJeWTAv
oyq9R6gai+N8pFODKh3Fk9PbWFleCy6PPInfMMF7BUwGnG/9ae6p7urysx0kFTp/lINdVNQHbxxMREV5

VQ6T9bOLLOQ03mi9eTiSe/nSDd1DxwmSFpSntmdtqHZ+F65YR8YiMaoH3putcQAO5ZcY+XICBQ3x2LP4

toB6k9qt1g2FCT2jTgmmdnXwXBFFa7nGypGLvdJ6KJ
yhDaU8i0d1g4rTjloipBSqj3TlnCSzpr5bmaCEnOThLzrj4HzDRw7HKJiwjbtZDq38GSBIY+qmbRt4YN

uwNceGrBAGMtbZNeoXP8W2/h5wZIVh0mKT4MfOgTnEDQa18Wc6AmNWrhDC5Uu6UVpHM9N7YWEg0d6f0e

kwJ4cqvegnXp+qJqlIYrLgbjRNphggIjtgYClEOxvl
krEHy2nmsrp+a+iX9/IFX4sS1odvPGojMgAOsc4q8DH9xa0Q5iFLLNQKo+it2i5C0b4d5/HjccA57im5

LIvNV9i40l03f/vNftxBH76JssA0XaPvKSe+bI2Uc8oqF73zMphlwF7gFIknP3Bv/3jwUNDREIoeNuHQ

OK3CpM4QgDpp2hx1Dpft7foCvIAmBzxIlP8wtG/oDj
suomlp5WNX5zibZW8Ft60SGf4g7FdUAUZsSkWqFqQBSYYLUltBeKSEv/VNe2ap8u2KnXXhDmKHCJCyZI

kfTleX95h2OVj/9VYamnQS00TQF8LYOvzOGiqOYCxAhJjnFnGNqlWQ6g0rR4YJuSeNK0DstYse9TN4S/

yVN1ZR0BDR1+sHCxW1ysxcoBCTaONz6GBUzR+qTpfw
3sVravOQ5tmfl3pS8DQXy8MWRPp/Gj/zwqETWDeoQMCcU1UqulfKZ5gOgkCbPLeqXUjxuRs6ALUwEs4k

EjZN30p0fj9H3gls6Jq0J4vkymQyg6T37Is/ZBZe5N009cFGOGfn+eZTunlFHwNOa/xhRjcjQpzP1b3C

ii2sOKtf+xDMJ7cpVi/VEfNYIP5yUzRlKtAeKkGQH4
O6e9BAUiRvpBG4UIo019yLDj8NNMgpA9y3KWDc3IkYKsx80Tf+w+8tUCc24LI2lMjQlqOOSCThF8x46Q

t5c8BlpOcVCeRiLOG3fuuZflnHNfT0MQWZNdRHUwiHjxT+8d/zzqDdCW3m85s1rt8TCWvI0bRUqd5m9E

SdTZ4eXIH8OXP9pMEji85M2MM4vtc3i7419FMktksP
f0zykCJuW1SY2pMGJ3Ti3wCm8y2MyXxi1ab8HRxhA2TxWsSVIutzyK8i5oC6TwrPCFYl7NFyr4+SoZSu

lW5b9+RyVlKFgWU842rAb2l75lQqer+cwENlZoh8fmV1DVtvGP78uOuw41CEfdMv0PS2Fu0e4bstneWr

+ELEHZEJayuU63Abt1LhIj2QtJFMGsosU4J4Pu0a4h
N7xum8oUvbCJNzIRx7cq/e27tMhWG8LLwvSN53N0Vfr37sfiFYomswJ0GUKTgnHgx37eZ+6LSvzHxO/+

xq1PDyO10wiz19EmLC9pduwyS6Vvv2U5oy41R/eo2fTKvBA30kLi+DiGOP8/sEor/gFGX1w/tMo+hIAG

Tng22+9kY9fj30rKczrxRByKq2g6rQxyfz99M6gmNB
+B4tSkdQVcifL30vT96F/Asw+JU2sZfR5Io7Npo9LV9zWQH/sE43wEgyHvp8q/9hKL9PyZ1XtushnNRi

+ZHd6Z+rk3u83OANFwU6qhcsQ300LquGTNymp1VPiPJOL1y/JMJwammeA7uu1eCGuWQUnzJKQ1p35X99

xBcZy7Yr9KvJT9wlfmueblXQGAFVgSyk5e/BjfDptL
IJ5cssch96qpcZ7HJ53ehNEzRzu6IEcDxwc7uoqC2b9t9w4LMr7Ara6Jw/6JIy6futYrKA/ja8HrW7Ly

m8CRa3gQQmdwqpWwHQeo58AqEpPuvzJnWModqZagra7RvE80ifd2lIxKDa5Cp8UGT/7mThDuCLaeuQii

MzNA+9vg9YcebNNBXCKtw7r+oXuySFL6MpzWi4a95z
6fZ1LFg4/yEL4nigFKLU0gr9EsbxJ+9tjZ+78JBlkt/j1618I5AHIQelXjCwRukGobpRz/I4u/MuEl91

9JVldlzBx/eWg9w9gJ39GZ0AzKu5g3xSp7s5MlVkvRsSM+Kmh1jcMMggwJuYZ7tkGq7JyVs8ElPMhxd9

7ERKHUWJWhgXdH+Ukzep9TmwIk1EY7JoLsIvNbZcZw
0esJUr0mouBKZSyc5fsyPmoUoZYiFEQ5pO+9zKRoXO0+S4QhNFQox4RXjYZFy2rf3fgQ5XYj+bHxVnSx

oqhR9c6Xi8vzU0AG64uypeONFLr1r8530T3X8oq4/5/CxoAQipVQn3oSMJn3E/mlZIv2f7SZET9VAh2+

Ne1TgNVIVIM+2Gczi4aO/GifeAr5tYgw+yn+6dkxvF
bPgMIOq05olcZJIPZBG2VNDqYuZDKL/QQ1Huof9c6JZ4KpILpK+nH1bsTR/kVqtNKeXpvDoA2khera7E

G6MfwNE2Ciwl4sQSUuumAMoOAf8sgw+WRDUi9Gszpxdsw3rzaDbTn40ch+J5nqkj7Ehq2kH0S2cQscPz

kw3tQeEAFRV4l9aC4DOLM+d5nUw5s2e4fxUcn3McUr
uMqq+xbXFvBZVucn3xrDnWPuabTvsFXYQ9V5gMMHA/n3GXxyTS5yyMdmuDkxf6tags1trSujvjiJUc15

S3D0sfIjUZqQrvaJFhzf82f62287MpGit3TFYdrnlzjrE3RN/HxuqApMAJgfIIw61MUCnaSWkSztx/FG

XEEU99gdMcVDaH4F+Er7TtOWdrfI8ER5KhQNjyEIMr
tWazIg5KympyjmFKEdRTt+A1S/R+eXGqLN++i+yxISWkvuP2gWhMcBOpu0JDOG/dtkbSMofcVNWcyJcl

K3KnYekMKHi/jbqM/jv5Zgvee3IziTK4k6R03f0WgSfuVX/dPDe1u2y0rPWd48nRXpV0TXiYlHyBtsJk

jk8OGUJO1CmGtB3JREEgRGsrUnJZF85/Oxppqr0Bq6
MaIudzOuNASPgxZFgpxoOs/Df4vxlDP0yZqsPy2uPQSinQx5h14tJ78LmCBMDGGYm42E4GxcCmim+[facility]

NaSAkVkq9bX8J3myyQhx8fmV68cbBpJTw1kkdGasIWyKpc+UQHQQgFm6AvkmEVsLnJT0gsyJtRgy6f/v

VcOxOVkaUOQvMs/IjVeGuux9i0YnV0q/csOggy3H5W
iiL+jDNpGFTthdd8SaVRf9PmlvKXWodzvW3r8aA9mfepGmymC3z23X2Ftw6TW1NlsfvaV/+EfYOhJdX3

5Z7dvp1Rl/koe0ijT0jerwXjkjcpqxUMaTDU5xky4agiD7hs8GsXaWKt6Rtl2SC5DlMbJR4M85Vamf+N

ynbf5vFSyJhMLd107IvOyfeDHmejqrbPW4tyVY16oo
F+T7Pfhl8RAyHTt5iY99U8jB/m/yqm3NVp5KxKfko6yCu+cdGLEvlxsY2kVoSQgbKFIskFqrICqz5JAp

NUnU3liGfsT2LIEQXpJ1htCwGYNJbi2yV0wgAta8Qpl3vZ65HfzFvjQHEOqxZCH0A9KNMUrFeeG9TUKZ

40gLPj5vjIgFmrdsfoz2TyasiMViyIjqD8vVC3yTs6
GX7PkoxQQucvyMx//80A9ELs1QHQa3aaxFsvecffNA2zKWjHEuLW007bVqGrYKH1IZJvn/IeuxaRp2Bs

rpA1DMI2xx8AAxTrDeWdgnOKg6YUb2BpFmrHV1XIgsSZ0mzlTwr0anYNKmfAsxzMB/qdFYLThupD2DiY

jtqTVdQx/BVMBLu2dw8nW0jCXl1GPuJ1yI6m1Ud71Y
hvz1ixCaL/oNgumpSqSghpcg6vxKPpKF7ifrm+KLcpCj4YgmmT/uV+TDoEMu5trvWEEK1dtiGhZAi+gz

c9ZlvRZjsPEQFgmhGPbGf5zXY6PbQgPn0dXRVjPWkufySmLKUeaLhe8HiYP5z09VvPo4xNyAv/jfUhdx

E8vyBX2bFU7wtXEM6k3XqnZLSOQuo7H1+TJGVgBQjS
kjItE3+as6AAYuJnduwsOs00YuV75l4LDk3VxBZH90S4dr7uJAUOOh5SqAfIv2SEsOb46igouz5eprmr

ihDjmAXyZLC+uMC2kQOPZbtLGeSuZq633/fmBeSG1IUNiO21rZzNJhDEmNHGDJoBj57xIHeaoxyGJzM6

Eza9o6e53iiPXZif+StVqnd8vhHagdGO2LwJyeiQ1D
SJt4IVFt52k0sGTXt4YKM1i4B06qm8/n8qfXA6+wYKCncUpmzNzL/U6YoqBVHY+Wc1No/blPaUN2onAk

LGlmTsCXqFvEDmPPWvo87lu7v7/XV6tqQYn/JOlCE4gUvVZzV4+7XeSQcQ3pv0315r1l4J4dTqbwuKUH

hH/d+Npf6oQ9V4KLQ/6bQB8i3ZzblCPo7WRbgDjrp/
dqOerRG+mbXoaW4dDAYvuHDXdJLoLQoLSohPTo47mnYK4EsWAGnKE5+H69hBwhs6vZzs1Vvv9ncMW07k

CILqSxoBIHXFOdoqjMfjP2+WGR6zIU1TZzOCtyU8ykKeVBCiE9nrtCl9zngcMWCNaXIEeSCvmFdHFNLS

1SDvmVphdIyRzVHgg5iiijyIGtbR8JkvgWLsjk6lso
q0teRTJIZr7QK4V48fFf7FWDw5bpyqlR6jUmM2ttSlYv7MJOpKWx+4ggUvogFrl6imEHoWH7Dgvq6GY2

6aHIzceGAxITk+IRrdINvP9P84SN2d/+IPnJO3cILU2WkxWdDYuIh+Q+u4b72egR/sc/8LjGLjqkrUYC

OVGKS+gDM7iXQfm/HK5RnHV/w3cSDYUaikbgZFePRe
12cYKXg0lkw0tYBRCKquPAPa/OVBkzFoFsau7VrOOr3N90WRLSeMFQBlSUku50akVxDe8Idc1yxDxNdz

iMHFm6HjBaLb933MGh133hCl4RLufumUzAHnhf4JMfOi+OtCPQ4tuS0IqmxWeg5CSG7NSKurcefrtolc

n0q2RWf5Y0jRR51Qh8Vw8eHlvLxK5uG+jCuEIG5Utd
F5tjJkXnS1d2FEH5ipXgumMEsZ0uAqThKqJEjS1rB4yQEf4CQMuE43dhTX6D0SgDRqcnnpx+35fhRzvu

O4BeVtRXG6+pCgwBuu7iNOhzbr55TlPaS3/RzamN8FMvWktNl0YvN0ViUNvtBhW2uKKYK2qRXlh5jVon

xNWM6aNV2fuXRGkMX7bva311GICiVPMTXRKmQutvCo
dNyrk1SRu9JYxZq6ROei+R32Ywaxw29meYMUJbrvXnzF17SX0kXyZd9XIdYrQryT+OBp05r2H7gIo4Zg

1/E7YMLOqTwQyqSVSHt0WyC5adzeQGs9aMorL2Rq3r0sWysitUWGpBM7fjXuY7AhXdWx/eTnuAHQLobD

hnpwIcPcjrVEOxTShwSUzD2iZi7wbZiPQfq6H8eyds
OthuG3UVsscQpcuE/qiTig/rc9QQ//es8YGCH+Stz9nu5AY584WjAOh3prJDV7zylozdbHUwRM+oPXrd

JSD0c4Piz4p0B5uItN43YcbGNGG35ZXEjsq2kGhkcxjcjO5Fawpp4NLW16Jn6Bx27fxlPEoYwLtxHJXy

O4d86SJghOxrSfOJozC5aW0LXhBdT7tIoOgE+gitmF
hbK94YAybydsgriTB0r7lGgZ4BsMTogpgZcVOA+I1jas5yPFZPF6icjkqXTw/ejnXz4I7QKNi/pU+rp0

6fHDcTLj2yBVKyZod26v+9ic+Iv/1ATuerzhKWrSkjvpN6adNLJRt8+EkxpIpOU9FTeI+WCKwvHvNCgD

/PVcUILIt+pp8yF64zmGaoTgrx8TgXo0099shXCrEV
J1A9EU7QjhztiRXPQjK8koEO2+/ojKCrNWCQVaCiABfd1v7V6Ihl9Z7k7YDWNVVYGuxG/LVO6MkYP2M6

UtFTwvMwyubGpshxoUHPpcK/UZkSp0g6F5BN+T/MgscvMYhbsrOX3YSyOsympIN2vG1epLC85uYuhAz9

I78P5yfqu/056uU0GIHVpeht9G3ORGXLEk6Za5LzfD
qlRiC6/Ai7vI0FYuMSeeGSUaCwzkuO7tFSONJS7uguMpk+rIbK+FVC+2igiVXIM+lnLKEXNe/vTKbpVM

9KY3pzUAvTszAE+7YVeij0k+DxJwvG7JJkim1BOT8TVilEt/4sVKTBq3fBkjApnnz34VjsF/t6kgGK36

Pbs5+9ZCdtHgC5ctRRID7taryxUM1MTGW4iHhe9aDy
kOtE16vlK09mKPSOy5VYFE9+NAJnTw7QWUPzh7/bGMswLaPHF/srLBhQgNvq4P8UfrDlyV+pczGs0Ai3

Uej8KxuIOsRRKhqMwRNx7d73TUQSUutDb9L7pqtcfM2ck+EW1n9FWOcIIjcHt632EEbg0apk1XKpsbw8

edwzDkUpuYkL69zu1OvlFDv4f+niSfG/uIcjUsFyjC
bWuv8rYNF+PVzJv7VHwEsL3V9qaTCIVHss0wTtjRMWHhHUJ8PfX1sXkXPx4npanhVWZyvMOmZZFiCHNE

VvKHvCT9tQVohPVLEqL7nKpmxgwXf2bTfoAY4kv5oLGxBtf8s1mE5mIPaSxk9xNU54cFLHLjOWKyNYYc

P4BkN+Fb+fZaNHgmr1ewg1siX0JjV3EpVp/qrlGtbF
7qAV3yRH682I+F/k/es8nV8V0PxO7zp+c+GL9TPcN9JrS9S5tjpQhMDJUnDN70oM+ASinAhUzQ1xEgAf

JQp+vg3s8btQVsoACZ28KB/VGKXKS8ccZLq7IaIzVZCHkokvr5RpCluejufwnwVOj+yhGVVGltQmpn8k

0QHhXd3zrhb8cZFEyn73Xfxmz92sgrpsVwihhwtzit
bsorvbnHnbOn0AdaT0/Ixw+sB5J7CojTTp7K2qvQsQyzKpx6MUrEsi0rMxryGDj9/0VljpED9KlN54We

qWa++9jhLxW/3it9jzJuANpBb8ipnurTiTl5niYAU/bMlEv5rFJqV/mnGfEdlxFNp5PWhMMXO5xe/VOQ

vJDd2OHpVEfgRfwjLbQbbytjp7FXI7838VbY06c/2L
N9zcFMN5A04+NvviK5E32SJku4xz6ROE1vC2njLjf/h62fOL645ZcrYfrFRqLjKEqWHFDtnGlVoxofbh

xILC5PKXaGxggHSiOq+DyLFx05k4wqYP4r+ZiQQ8W6xu+gh5BtlI2RQWiK/lPul7WwF+xlbUZyAThJBM

+lSa2oJxcbva9tU6bdl1mN7dq9hm2+YkCxpf0HCoxB
JC1nir/10rTuTegazgzEubqd7LxUcEx3wPaHyrIO4LzmczeLQ0FwfRZR/Aghr7Z1Lb2n+gVRkeRE18zf

yuccJBnZaL5fSH4sI7+O9vJVx1tZ01ORsf7HIRLO9RbimeLVgmhYGT8SIxtOclbhpF5W9oA6A7T+xQ3R

AYwBbVyRi6Nrw8fPXHuDo4Gu77CenlL/orzwoHuEkz
mJtcuD1WJ6hBQHx4erYsFIFvWZlT9w4E9tB2Jis/NOoxB07mab61rqmr2J1GRn37yIr8hvCZlvp1y9A2

q4OedY2yXB2iHt/2kS4ztaoG7l4iiNu+l3pqIRAoXGQWqi+/qRdafEidIa5+38H36EFEthwcaJsh2ITy

RuepNanbSSbmYH3cc2ZoqlJsQPXCC86jqU+mBby5aC
pHPPZlOfoLfdoqf/VEn9zqorzfKn/gpfLZO3f7eeZbPm6C/zmXwMy2NlyrrO0h1LR1nh1BbPQrCQYbte

OLSZ4p/FxKXA4Q7IFhUGJuq1L7BEHDiQAMOyBkXemrtEbDyk/qmlAGYfrodVesYqoxHxvj8i2y5Hx0Yd

uZVK0PylrVAyL3c3YLF9YLrFPGzbZByqO6YKpc17o7
td4bLGFclHEPMGjgc1Hx56z24r0jKl0zwAvIWvjLA9GRuqNgHKXLB5DoJ0iENQQpOUnQC/kYvXpwCvwc

whl8QOTJa6rjDGbZwUVs0IXVJvTU8RY7awU4Jzd6lcqxwtVElKSHQPtqYtT8EB6igwU+A8FigyExeUsM

8/Y3uibQM1rNc5NMHTatTkxMK2T1sOHGq9zwQEiRii
2VcF62xoMSNokfa8q+8lhZcgVR4jNq92/hl1xsBozPELPV4KIRvbSF/C545KlkPtcunzpd95JSpmagPp

Z+hgoUTkwY3vyFQqcML9BgHMr6CO4aPpEdC42la8DTsAgzb0O7kzc/WxLf76rBj6SHazpX1WwoBboJxA

cuWg9VWssIPQ5s0ZMo6jtfIlFHBqK2xNbZt5aJBuJ+
Ky5ApLMysHa07LIP5rhC47yA45aMhDwcRx6PmFD3abMzmr8p9jslNzX5oHCTV4LGV313E03MGxsLS94x

Jm5ZGZrhjYJ9yttmjPj1T6KPRMpqYWN7Dhv4XsB5t1KtckmjB8vpR3BW0J4y/yKRwZXZiBqO9+GNxQuo

ZXdX6MmmOl/innVfrQeeNvvCw/H0LUOH7f+8zS5bgw
jMOzZdrhQo5DlfkIUNJFn8E3VMpM+28az64cFx25PY/lH/sNHrJtW/jmfPw1YTwFkaPEz9TniYEwJDUD

c31z5a23NbTltvvD1ee8sSKiEFkdKl3fUggo5dmgi3L2Ejke5g9RtIWnR++sSxGcyST3P2+WUQ/V4ghc

TtsF0CP8ZlIU2cxBeKJ1cIsWVjyQKoG9wN5m/
Zr90hMtsXcATkWIhEZ6ZLj6K56NK6KSU5ld2HjKWmjyvcBo+nZ8aSjKr+5F6H4pQZaJiDHDWQWSYgBoM

UVGBYbNJETtQKg2l55AVp/WDLVuRjhexWyPQNjhu3c5x79z9Xd2RWEDhS4ZjUx+oT2fcocQ36MxvPAFo

8SocXpwBCsx5S3ej6ewpk8/Wn4HERZXRiTN0dgs4fN
vKuql1OoQljTz0M6aAWexCFiA/mmDRNkPom420W3mHX5gU4x79tsGF3+3cgrqxqbrOnBcuE+b0BWPKxw

bbTsLfJd5WoU4ZP5jeGGYB/Bqn/1ES/i7LJ77JFeo7XGIaoVJ8246Ei6fyCd+xTa0jwMxOb/em5Otk9j

0X4vHakMPRV0zsq8eM4ackTS5sovSqqnCKZY4e3qsS
21xl6JI8SuOa2XVExgcb6fxn4P4mBFX2H3OP3EAiOZgKZrl2ihVKE4L/Fxfgd28tr3LNSx0XjllrdW1s

M2Jl76iuDwD9dvp1FguAda5bvxGw8PJiXxPvp1dCUAwJQncw7Pcx5Q52+By+LlvcLRvuS8CZLBB2/s+/

T4BKYO66+WbBIbYoKnph850l/lgIQvC9OWsQi9tyjm
16flIXA6Hn9COnTMDbl5EXrN+o5iY/HnjtnFXT2RlpkNktAQnsUaag6YaYId5/pOUnuUlN8pNMeajItA

fZLWhnQ2+iMkhgHdnF4rYu99k7pBxBgGIDXku6bsDO7rv/0+XzDzDc8/tl1jG1yo/BDNvOrR1zRyMEwM

6okh0vbL2IeJ9ubBWFYJpeA+zoavFfzhFy4hIuFVRh
1flhuO3Hc7BTcaaUvMUs2Y+B+dykZUAY7OIj5r46VvFQmltHS71kOktCEau2PCnpiauHnPqXiRLxboDk

zEvuHzMtCzZ6T2vtjl7KNJcab1OvafD58D0P5DDPt7CAmQIbwFGkDunbeJsfbC/IYpUm8YJOfYxg04iK

WZUYVqGDNmLvWWz02ILc1GBbqsH11mdQ91mLuHyWTz
FFCfndZma+EuzPlxGH9n3oArNjK1NKhrZf8RZ335UJJkrUQSvPyb90ssnoe1Oz3n3sALeqAywjERN99n

F90i/Phg3mRx6qg9pyjqmp1pJPI2sLu9WR96BsPacwGUGr0JAC9p9xyXoJFvwFEbTsXubkBE44V/0tMT

G7COJcAuU6/1t6LvvFzahZvkK1RMkNB0c33nzvEw1q
j8PIdv5Bvo8DGsqZI2mjWEuPHQK3fVm+AfDGPXm/lfIUrlhNvlAGox/xmD836pjm4QqU/UQ4XhuEcrke

nvq4WldWuKAWcn1+UbcdgvW6y+DwqdcylUw0GfybyGZL1/0/Fipb/cSkzfi4HqQQ2uo3qc0MW9lp820D

3o2UqrOY8dMFaTfM4evdzGZQLvJa+iLDhSKaYWPE9P
4lFPwo8p1d9mrXtknu3cLWebiWuhNNM6UVqccZNbfX0mbZDAJuoBAx17OFYuZK8Ayn/FV1EATbNjmJ07

SECvn3r7p8GB1Lbk2PirbXnjsiY2YQ6Z/Ab/qLGM2L4AtbClYNtMlXi1oxfw7KAKC/GS0cpg/tfs294m

/ZcanvpNXucb0hn6u4KFtKaianbt7+Wtsx1wiK6cWu
/MmfiFaZ8Sq6e5X+UovaIH2o630Ss3Qj7G7/SlWlH7CY61/KOZl9f+tPQ3KL/HPbdcc71yc3J6s52Gby

y/kPZ4IbHmoGFkPX+u+tWXW9alNQvgvKX3Cksvcb2gGsfrqOvy+3O4pJn8+Xe/dKMTfiWshWfn3ECQ/s

VP6ny1H1Xy8dzH1Fpi5RPjBCSe/dVlpDniVkdZ04mV
1294/Foskv8q2cThtyyZfLvb/BjbYW1KYnHYkrSEV55/1JkSTdt/bZExubryqESA0Kzn7pr580QATQHl

cZd8+DF6x5iBWQse6dIaeEw10+q/6hBOLIaLJPoX2Yud4fkeoIWnBc2OlGOsK9K22NYZw2iUbwP9yf1e

A/jTNC+JZAj2DIQXBV9LuQN9WNLRi1zhV66dGcQUOY
GZT7Bm5MOYW0P/O2qUgspK7Cf2QPqYqQ1gp6ewx9nbE4HH0Bjj/EmVwY1d/UBZk9vfBDnb1dejfCmj+q

oaa95vK4/8ha0Ac0aSlX+T5uSB0GVGeHMI9uWWDsNQNUJsEGSJVlwpZckeq8SrrWE2ywpZ0IbwU1mUxH

twLKDVDtkLdmrb0Ia2jE7qdKxf5gz9l5if29o5Z2bJ
QfWBEswkPCamq3hoWK9sjY+sTT9HihyyrMgvloGVHiFJdhooV1kMO8dN7ENQqIoPPMtsQSkUr2dJmtap

UzQSNdHp7s1Xk9jH7YY9tDmOydoZqQ+sWwsUotevPbB2RNk5jxwIJf3k/LFtGyWLFR6cnV+f/Q8N2a8o

ppTVJUwX1PMx10XlhZZxLJlLQnepnmPgE1tnHNp1Sb
iJA3pfu1T04s7d7c3x7BFDqpSKe5TzDDRBOgfwpiwRepNhI2N9yijwefgTBr2n8/4LOsW/ltv6cyp6+A

UoEtZL0lk4im5fMeZK2A28+t+yaE+Qy9r68uKiQ3taKKEhV5+FxYnP4ALfsM5e9XGz4OovdTdjFg+kxY

/QcNd3DwKsEXj/3PjDVvp72FC1d9JESXeQkZUJIamm
Tossx+GpPeGwqlgll1YMLaWXhlbiJ3AQysCjlDN6P8DBgRdsRx4sFOpY/ELIEL+kXb+g0RcjMbSj50hUcl

aTGy0/xkAHN5WNFTYAoTuErjFPQOx28IvgoC0zttQNJWXspM3m3fsv8Y1uoW2AjRG7P1uiodr9aVqSqJ

uVIoMUza3QrRv/DxoJpa2n21hmbrxlqaCo9B+gbDNo
D9dcbT+RqGhHwFe9KpRqCzUVcgUdk+GS4VlNkxM0SpYLoKeBKYw//cswVtieJ2zw42ccH7/bJCRPt/xS

3XtKeHr2dLpvTpgRm2+xLac5SDODcmzdRV2Gu+BFwZI6c6npL8mNNc/9W5FVCOHesLTj4EgLWtHE8X4g

f7uLaoh3wTP9GbbGhbuLm7ftF+pjAanJ/Czy6+9MXV
l23OgvwYWP+9iafkY9Wujw7kIVSGk3oBYwzKfSZhwXvH7tryyZSvcUPbpMs1iV+g0C4G4gMPhtgrjaYS

FKC2Az4QquB7rF7MwYqZsLwDLjA+9U8cZjauOH6SZ0A/ndUYxk3lh87tqUC3l2wKSsJSt5MNd2Djjnk4

1iif7uS2rbWnKhNCs64Xb665MerH2Jji1GvCKn1Xp+
DQ1+T9Ar7USqIgruEyETgbWjxwT5TZiIZLAuCwafMIJQuTZgjBe/xtddm4AKKMyWwgmYGJKtYwar99bD

YFCdCrZ/ByVqJBhVYRwtdN4lRKs1KHr1uu+/WN6vABbvWYN9Zt8A/Y74ibv+2nb0e5LQjZa+Me23YjRu

2eNfgmqiw3jLicmoTpp1GZ/TSfZTBXus7/30wpxsCK
6erkDHNlZaTajul9X+n/GZY/7GGnfQ9tkrm5uPxnLjhHmxs6sGhbwBiwH3PmTsP2vAMV1Eti0zUWLgZ/

QXNCftRTwZVg9KFUmk/XKXXwZj9wCgJonhuVsfeh/qrtbZhVepGb66JvxFOA9HtE6jVo+l36cB2zeu2/

hmghEfU8rDYyc/8oMKcGlLUcsyMIm1gW5FErGSvaD9
dMVOGPJbXzfTvOXckcc+5GLXU0B8QXF5bMS5HBtpNmqNQv6mS0oz/9k+kD1CQ4Ej9fAyp+7S42nV0bDz

P4xV8e8a8My82Ow6w/Ls+LKGhxe57G+Z8qr0XOrx3Uf9xFVWV4Z0OIB2hzKKDbmX8Bg09f7Cyn/t0y7U

q6m8YSnmZpmeW0DnZCNBV3uo7u+FC3ykkhFQeRVt3+
lgBi9Cp3VHkOtWkP+rz5ASVvnQ/ro7isF5ig7eRLACeP99/aDlYcbK56xXkQ3bppZJ0ngpnqbFcHBE6o

da5ZAQ0r7UB2wBfufI/Qk+ZK4Bg1F42BbfE3izfM0RbB+C2InwosxN4gf9HdqKoCErWopZ0vNQP0Zq1Y

BLv3unUnMIk4va111Dj2BtsptbFbx0X9BsSLmyD6Td
1srjSvqCg2rk4EztMzt3/Yq/cGOhD4kn1DNGj04EImrV89yXE5JOFDPNMnJBTvP6vHxVOSKieK41YY14

DcTO6yu+vEFHPwpPoe2rnhyrzwQrWj7s8QT7RzWWGhQxxOr18HMuFQ90A02h1qZ/DQES2fIpc5hBPD+Z

9ifubMpN0d9wXiYd6n38YgGSUq2UYdsM3n52sBmDFW
PN6opqBLTtMCDn6dE12kcruQPtqV3lYe9+CLn1lNn2mW34gHdyqFfpHnBGI+hTwbqd3/GN6itNIAKh0x

naTTy8ofq7bR2dGPuV3As9ONkiQdOT9iRRilHImbEwb1A9dCv82ZXsjmQq1J5Jx9l7Bwe+IXT3AzH+HG

e77cam/AJif3/U/MD7al32kLQWKB+dCFAF0JSSQl2+
+ElSj+iDAklFsekX7SDuad26Zcv1zp4770yc229Z2ArilTxpxZ2m371bMlMJudLIECIks/V5F4BP+vQo

b3361L0zL5UwguEBmoJMU6uva6V/jL4vUkjIF8XkC8VYfM2ZnUVhug5ZqcGGukP+UIOwsp0S0pUsorhd

Znm7AZNP0u2DoH1IEQ397sjMoFi5XuDwVPG7rMuuaX
RfR7+I8xZyVB5KT0myGm/mm/BfXGy2aL4rHRFvMKFVDYv1pGNHf0anOlZ9QT4yViNRrZRypPWm6LIdBC

bS4V6QdtaO4TBPM8D7UY6hKHUm/FRU56MYJTPZ3REVHx5zTAUUH2w2fsF/qc68zfKIMx8sMwk8UXfquj

gtjzoKtkpuTqQIaziVBcNsbcN2oOIsDpFWLCLDxZOy
GZlG/ejtoWd4/CQTetrSvsLJhZOteYBpstXCBhFpc6ZZx+ZMgY48OAx92gQWXsMIXo99V2T9QEHiS6TK

kh4YkGjVqyrnGNmGA8tjjEQtc/f9EyFN1UGjmlPJmnicImBJITedXCKT1ug6+JMTQQddLnCH8vuo2vXI

4yIwCzv35F3euD6B2wLJCFsn/aGRe2jf1Ce/cDcr5y
AeN2gYkZrZDHvClMo9UArky7Ije6sUxca09ty3Q4QjpFhyTpfvj6Fdi525rVQyLER3vIaEm6JJMID+gX

6nOoVoVw3B1O9/ykNKBY5XKBshuQ9jo3jCQPTgi/OD+9nGec5PBjxyBuGAuHh9kO+PHY3/rY0t+g0xjf

ypwFElNsF45XI2IG/jU64yYa5mx5zlStQaAgX4JNz9
MUsuxC7FAss9MGSvQVcZ6TZaMmdw4LKgtBt9kkfoRlQqKInOsCH3eut9J8DQeNClyfykkuKAYV3SvBXM

wOg5GDDkMaorZ2vIdvj4Eanzq/HAv3a+tZvkWF9aab8q0B3MecMSiWj0VdRG9JwMKH4ungx/5ZfFcUfY

llwv7vws4bLQ1dEuYmulbh6KvIpn2IWbNQVDkWOgKS
CChKX/EjhK93BEOWQ9z9gb0GtkOV6otslBv5ywoS9jtrhrfUHIyjHdG997vNZcf7/Sj718jigWmtqyku

UoXuCNBk2AdNDkZ2IJm9NougwHfuxNqAHWcDQeEowPHw/dQf2loxNBo29hL8/3uFW9sBibLb1eLvOU/X

6zK1FWbag0OFMUjmnjlI1gKTsFSvj/UMBLC1W08dBy
bWrrlHs0c0tk1gQDyQCCNP+OQetPvwI4mG9F8JUW1cmqzy78I5ueRQm5stcyqEpJmV2PR/FT0QeyndBs

rGHDYiBD5kKG0JeXQrbAGZXmQ/r9/wD4+pWW5IZC3Awg+WtCbTNYRz0ClqTT3etaEOoAKfV+YlggYx6k

GwhNcGA33div8cKzzsoqLCJ4FLufTSVQtxeehuquQX
8wLIrBI0IEXEQSxWxvnfcn4xHv0DxxhkEXzixlcZvp4oVijeuD00LrXQHqfH5v3mNjgw07pDO7V4uPki

7R5WhjIYDh+mr5z3g3eK2j5T4vNtVQn3IkqN3UxUkoN5lN1YFENUW7Xg6RBiWxjgWOUpF3hM/GYbESOz

xWii/VE1hi8wat8GEF4GE5e2sn67o/Osqgvrz+dnNH
/Ge07O7qRiqrqXr9XKVk2blscOuPLIjZYnmHNeSKuAoE3bZPOjuAk5B++hYkm3p85LMa51EFlr3rdH4B

DZPlCgfG5Ze7r0oeEKL5aKkaNFbzlmV2gAHy1Dr33cxuEsZLf/KVBzMutz9A9s+f08TJpRCpb2+QNrRY

uEvEJN24lBqcbOeP0WcOlmZEH0AjOPWBs2tuKdhnOz
ydBCueUCSFAIf/813jpr86LY+hus8bUHzJghQYtYr1g5+48cdfrWshjqwoECTBrUs67I7NllzsIpBrcW

+op/pBidU9heLiw44ncA9w9ok+IMoyrSzmGMXNu48YLtVHTdQ6p/J8+uAh01jP4F+gGr155m11fL7Q/f

Jv3clHLf+W5b6yYb8ChXf/UlmBO7DOQKl3iNGbuKyO
LrnhmCiFywCaqd652NEEIFwmkUudu6jGRxNQ0y/bEOtRTqdlAzImiK13fEg5DTZPcVH+R5d24Gt18nSq

Nxv+W8ZOCXeWSBwxFrncyeTdly94e1BxAEi9LDRMxhd3S750dh6D0edNSPuoh2g96C/7BK5BXCTENYFw

uGVXIwIdIH8t5kda2s0UB7Ul3UBcCCdXQrfQjDTLHF
EDWARD/DMyA3F05Dmhj4Yrqbg3T1Fa6PylTeN3KPwmaoNY/83/VmluAkTkmIQ0YOIH/Nu6HZYUEs0UfQj69K

8ptK6hcjv3xbo9lxSZukPYQ0J//aM3V8nuzJx4cnH8dqEC9n2idVxUS1tcOqqPZnby7zuFPBpthXX8jw

3c8UPJ9DiMvmQv8BInoIfLDBxxkI1Fzg5KpTeDKE2j
+ONW1U+Ma2guOXdKmpnsxDniupoNY7E3qBaEJP+lyL1YLDton1CBqbpPPcDQtEwqvpUg5a3ftqCVwW1u

qwSuREqs5tyJbRYXP+q1kb9+1Y76LmCirNw0oyfpc+++JaGC82sS7BNUDnSFVdhDbwewpXXKeI3DRzV2

ODvo+BAEPqsyd1vrbH1+eFefhQFksR6xtNiuz6pKHz
SK4IYLSLFoeeGIBgpI3pcrZIQa6ng3cmyY3TI8s5CT9j9PUu0lFn2URLPQHf6lMcWPBMwsV6dHcqp15K

vGKqB8e/WYjof18XpQO2qsEI5NQBmmBSgzcjkZXoy2dHiQsChnSpF/3d5n6tEjxY9JyUVRUvB5B+1kvA

dUu95+1RX+d+VyIDJXN0Qd7UFgmfw+FG+oJf03/t1E
pKnrZ2bsw0LyrDL5gFQ3VVot+HpQX/JUc80eND4vNpmyrJvbUjo8nSHKWTgsvhlm+se0iXDVB54H/I+/

3J90SYL96KEzfujMWapjJKmWjWU/lebzg4bc3IvOoCvD3VxqVgb8/O0ZTvebxYbceHMhMI8tRfZRm9hi

8IzGHoXoPeekcdOKqaurBFQz2uN7O+WfvVPkdCTImT
8r/ioRk0eCziGS4BEFAuZzYpQNQWwbJRk6jrD7QQEKj4/4f0Jszr3IFra/GEbLTV5H9Arv6NVAoM1JJw

3o3UJ/TXet0ufLYUG5xIqzx4srRjopPyNNjtJhyExdg/WAYv0dGbI6+aedEJoMgHU4oo+a4T+3BsDaYg

0nhddpFT0MaSqJoFf3wTVlKrVXPeS8ERKWELP4ElCr
sz4pYKCCBnVdokWdzVMoIIrPZ3O88SAUw3L7slMljDyYOU0LnDk1nSbGNNgiwpZxhKwu4huqlZsswVGr

Om6Ey6+nGkU2VqBhidXVJoDSdMywRn9mI6Pw9GGcZgYXOMUFNph3k76RcJYtdu3gqET/c7g7RHI/drov

qlWb4DBcFGUIH1DlzFPg2G11baVFNND1mdbW9KIu8C
DJlry/2mcNXuGGalIMBiekd7IH95FJf+OT0X639LEkDoyXRBx3nSszrbiCIO3rf5WTsGNrziuNwijUvZ

cxoIQHksqKwQKjZeZtj24xbDerpyEHnnvRfw/h0qxoEFFBJHedRBEHjE6iD6cwToJuqzLDLw3NbIkT7S

+cqJOGdxZFzWZcZdlVB3I6eNUTkB8POU9sezy6Mb7q
mZsK+nrmZ6p0CogoUKqxQ7fbV4yoBC0UVig6XexuHyT+TlryIxJF94nzv3oNpB7cnCKOgjBb9Dkx397U

MxK1lUrIJUD1hfLWjsgBsWV3aW1C1KLGz4dPTGHUdauttW9PYN0zBI4JIuQJJ0csgWBB22nTxEadkL5k

3zu5rVDENuqyvb9FV+KSeB1IBO384eEjEz68Q1qEtn
sp/fcrDayerQXhEfSNUjtKoxa+hmsouf/03JeimiLiEJpgLf7xhHJ+AabPuZvQqxKzCHj48vWzaTzc+I

KMrZTeIDp5eMRQJLqNTJ6H5DTXUCNnMZbrCHu4xu7FvKvvIwa7//6afsjrh7SPj7XISP3lltQ532rpr0

zEgHsTn3eqxsnbErcEv3yXJhKcLs89NYN03HSXcc+0
G/3vQig1VD55k/JIO17uTpz1zKbqK5nL7J5lJ915LFIvO7mXJM61rdK+pNgk8R+kbt2vwOtmEU5d6Yon

qa5scyV7AnV03Ptasgx74G2abjC/udBF5UWMz00sa2Uy/yUVFop23CnhqIpmyn85t22u3Z+Jm1SnbdpX

DSNqTgk3AcKXheDFf7udMKYH2C9DdGmeJYPaj5unam
n0EqaeMj0/zLouJLJKjQDFOK5tDPur++ItBNzqscxwswkfi7cxLowOxoZxG160NkOJNKEmqr0T0vJyCv

B138NOA65pgOW68c10ZAiggv94g99tfkkLHavgOAwVOPW8zP7EMTnjr2GW2zqsy0LkN7KAFEbin66upw

+174pwqnmu01WLsgMVjL03EFvq3layN7CgvcBg3Ms4
wzXGmvlUv29e4l79RT/lbiZeU8ouT5fZjIQs6g1mJ4JowHTa16CIMpgJ9vtslR+euq1Eq8vH992Z4L7E

Y0EleEuSPL/9JT++be8lNgKKL7KHiNCTL89caD05rhqRQkEamfFLz7eQb6Cy7yxrB09TWWwgBff/oFIu

xEDnpm3oXWr7V3HIDWxjcUd+7wOkxKCz73S5s+wKo/
et05xmCwxwKqr6CcfhCcUa23fj5rrQ5sgQHUmssTn5Xk/DDyfO8V4h3/NgkrjtQva8M+NX3rm+51VxCp

GWQ3B3nUiXnnYoe7OeAEqr4ciuixOwrprQbABKDWbQam5BRjuklrFXWk+j4/KzcmJNkzo0NMd1xHk7Z2

9rcUVbzBT5bOuqVis7LDJbRBMjfmRV246t62TfBDyO
Qf8CzdybnBq1ohDtgXt2YzwEpkGitTM337IlxetJGg92rSRrXMsvguhYQr93zNht8v4DTg8H+NrZOu8r

wF9mxxDcbHXVrFvR/O4xHRaA5ovssOzD3Vbhr+FUVB98r0zNcFxTFLKsA51e9VARTvewTuZp83ZPQcil

sdZxlTS3LRVmWi7Wo966sV+JUnUN2NdRo7c1DGxjt+
juYJTiTIn/gPlK1H7RWv7OPzevswD0jWqm39j62FgI3p1LXvS5281qCwD20ujPZXfHR/6GYOeB9kujgv

Kk00yQ1pwgVHKS5jnabRziSzZa4cRLiFiF220FFlniVKchMEocJukLf7TLF6i1nQN6UNNt+5c8B3IrWD

Nrn7Fllt4nKPs8Fbcjhk5SkzDl+/6p72WuqlDKRkmk
WCFvRXHYe73gQx3aDzD4VIn4HVwwXCI/K8+w/DX4TF7Ea9Tzqj3g92UWLajFwyv6ROyf1xmek0i46m4b

QLHcOTeuT7y14mh3uSfYSqEJcsmtZcA7aaoLX6N+UpBX2rTh7pYebHJuGXK3QEfgY8bR1wfufo1dO5vL

z3WurE3RYIR/G2tJbJDG/Updp4OWF3K+qYSnbU38Il
heb2G726Dr62h1iol0oKBBiR98wMvS/Vxv6oTn6fIXXnj7wH2rhqPKwW71rnwFLqeTt3zOC+QUhT4Bp4

iabmhQzZmLUC9umGcnRtqBUA6dCHxvHsHBgA6FU+UcTRJ+QJfGEOEPyUmr+tcoNIgQd0eDpNLsailMxV

cGI5O6ay6HTzPZ097nBffugTBuQU2lJ7StfFy+erNT
e3hxnk6kpFvhPrYagoTYDVbayCI6QZbM/zAwyHO41JwitN3KFVul8x1NK5MObOTHApD/0KT06fW8qsby

n0WfabnfdYKs/dszZvNCkX4Y7Qvzn+aY+VGkVw1FqkNfcZ9qGcGMuPBp7lkMf8uk/AGTZ/onCMzdzYlh

p3wzpmf0sGD3mRYR0y4kjXGiBICyGvDWyStOL9jHtv
iDURBPYgxs744cjer8HSqSzOc1eqZ6NVlmf3egjcQbglpOdIMBYBRfRqakMdn+3pV9tVnUNojoV+sGCd

kWNNWMSiXlao5n5qeGFnSIww7qcV8diXWrND1pKTS4VQCppgsnVzf+DDBbMXsy7Iu+963qSBueFges6c

2DT8coUbXHrU6kMjVJoBl9k3rT/W8Q0U+QSmbtyXdF
rbHT//F9np6i47g7Gkv0t2PB62sNEzRC4n5GXRMM60SfxxV8ILnjlXMlEHNF8w2nktVZTthB//KQL2fv

UKeWCP/ii2WsfQ80cHX0WGlw1rJl0YwKL+1XvyOwQxjWiQCxsJUxVicHRnIYSLEQlXwPeKvflnVKvG+c

VkSWottrUJSFqkZgRNbxiMjQ4O0Nfnwd8NBizKq4Ko
ew9IxkA55Clks1G5uxREjF47k/3ATBM1dSlZQoQyIQFAmBHvxs2Pv1x5BJVBn/Am32BBkuRid34/5zYM

dp43pUJOwyoqIQy+tt8uku6xSvnegO50tXkXh9fVnanSv4BVvKaYxxhuMARsvrMybMkBFjc4YNTXzgd4

8iBt0tzcltFLFCVb2zo4+aVXtj2+13QFNzrBVRiNVn
NMvfsO9ODGrUR+ti4tZTlKf9zwZRG0XIpJgM17cu9t29ZnuRAzkiwgOe24iGSCsSZQOF6JX+JhgVb5uc

9Isbo1HhDE+two1iACG9S497ordABY9ASFxo5mImr2slSbYXqFpsvsr3Ga0hqM/ci0huLpx1H18Ns2xN

soqw0efXn1T0fx8rcZum4gz2/e5CWvvNZcy1uHS3Wn
0koCLKcU4Oi36PmHo2plCZoMwBgv9d14ZSsbt0b3DN/8Tz03D+V4viMeyBJmmpYak+LDjmDDnkV8fkPH

ivz8/AtqPQXcJ016XSC1O2dSALy0QX1mf+C76UceXhH6+qy2SC3G9lr1C5JUshTfbNztkevHADCA4hRf

Sa9lvnWvMF7bkCYPKxF2cdOVKMoLuQCgtBPtkfVuqr
/NRzVFc5zrvuA4oOvKVkU7fBZhlzi9J0jgI3YlRHr8PJKbRchdScjGTQWyQ+[place]+mm1jh3RER4Y7n0j6

pVJP7F/SG3CATtCxCxN3iw2CU7j1URZvV+b8lODRaBWMXNbCVMZ0bwc7WKRQwKcZgx1Pl5bNh7btC4TY

/9CYZ/CYXIlfmcHCz7CihbldL7LnyVR6PhaIey2n6z
YpxIOAumv6gwskXA0oD2tFJGFQyPhNWS7r52sI/T3Jba1Adl9g1Fw2Aowq/7L8wmHJf7xBzu0o7bGvGb

JJ84m+JpzUHKhDkCD0QG+SLFB8VcvW6lFyfwJ//T8r0ElLl4NbC2DJ6pKh8RdyZAyHSt4XZRABbeO1uj

axW1xk3l2gifDizLg5mENg0KLnmcU7Bchm9jfuw/K4
xg6Tdmv97KRtZPtZ/eNlNcCd1U0AVy4fF3JeihBZBEAYO96/eGu1wXnqct2Z6kfpcl4ihjZbgLHOfe21

sMoQg1OijxQIZinmEgwBockLGNmnTB3NUhN40Bo7Lda/332e5//p80T+f/i3Ki6qfrbjd+wgKM308Pn4

TSjRmO4BXBDdwBsjCn2+61spCKM3Ib/Sn0SM3PSfxS
/Hey+KLzd9IcNJTIgCfDt21wTVK+RtVMkslp/wRNgrmRysAx2QR1bvNV2GgmSCf7zg2tINRdh5HTpOfv

bttCosykUsJjF8JNA1h1refYbxvYasmNjM8mAA78dwpdc4ECphU+YZgxa54VtpqObaFKnH3etCdBp+vK

22ptEZW3ksDgxo3PFB/scott+/+/vz92ur9jrxxWAWTa
S4N+ZdnGwDyhq05jkOZ9AUlPRHsnZX65/kZHBsm8r8gCY/mtzclsk/g+KRuXJJXDeLimJGqLR4AgRcQU

Gu0w/hU9lgUSPCMD0lQ67DNW7radgxxmutctzh5BsLqEGLmUCI4vlNpOlyuuFTDBxqlcJdKLEUwtdyeM

7fOz+b9BQw7LEJkUUsMaH5coCfk62ifT0hkrIbzW4x
Akxjqjuoz83U5emJYT3zx3qdCgFkhKK/8D3hvwsh0p1/jdvfuPNJnrjT1EGdNt3cC9qgxYAgE7Cr/E1e

NJU+8S03z9RuOCsvgfot30MC6NliEFQTNEYjReoez3M8zljep9wGlbzhpnDQCjGPFnXAX9oyywtPd3DS

Pjyhq/o4iG+cjzCAuM7XqzNTuh+3QiCLltXK2LBvfU
VevKTphGImi/bODW/cb5vP9WTwXWAz+Zr1ULlDjZjyTXUGnSHkJ3oEKKbeyx5wbhFoMrcPRZPRRF48e3

FQ8R4gfxnz7SJ2xcCwuESKlmvzrT3+Mg10M7sFIfp1fg3QjidqHxsKRdDpORSWMIouunTb+yDF8qIRjx

DmpfvUe0Ox9hjI640UsVlvpr666ytgzMkCniL7tUmJ
Vt5Rtb1V46wCog9zLLv6aTlfAe+5pOZzLK4ib81xXm4dD2cREcWvTDapj557hYauZr1rHFWSjv2MrGNh

yd2i9vbXWkSGBVtLF2h7LiAzGb6YAnOaYGuOZcyAqnZNgF2e8K4p91U9jYvx79sacjPcWBZgSJoEutwf

L6ZHpew2ialm9qqaW6R2i89yZtzQjqBw6w68HSOPHN
yC0ih7sV0LkC6urFU4P6jbPY4Npf8uhmyn8+AnsGAhgt/JXZhNNJ77f+K4sLx6GTJ79yg/qwqGh2kkLU

2Cos+/MYjJajtki2E7Slkm4/9XfJ/z/+Qf533c4RIj6iNxIWOna8YcXhEj49dNM3nIOkR3w1lrHt5hBS

53JXZXiwOVOFYr3hFFh8AMwvvV5UzyD2fqZxez7YQt
OwTFKymAgikFhN3IHWxmi0rlap+qn/SE8zcTILuAUb0EdZVVpg5yfM0C64wV+7vaoQsZZulxQMpLzC+B

mPjFPxsbNxQo5Cay6ZTnRmGRXg+VrEvcbIDCWjzvzJj39rw0tr2f74oy4gcickhtVM0TKXievycMQTJC

QdMCM0k9OhvxWI20WSZe6LePy8iodcJM0vvSvXmsI6
zs9jnVGDApNxwlPLj26j0uRwjktFHwiM4o6fyK2VkYgs5IASQyUZRa4jzpfS5iVm70VzP90sa9jNCZvo

bEH7EFcgCqTykgcilqyHUO2VnpDUtV32zDB1y2YihC3hAmjeaouRCb1bZ0NfUnrA4zEKqJpqe2ysqeew

D1q/u8XfNlbUdvzN9jH1b+qLBJj9sSPqZgxfQJpGJr
9t+6QQmJuRbLnfXh0MVC6wUK92EcygB94I61913rcw267T7WzMIH9Wh2CRQYPQaZ9SbeeOX0c7fbtv/v

BEbD+jMNiRqKqgJHwKe2hZj/ZeDioQWqychwy/Lg3BuSlQktfvT6TmhfDMRbs//mtbKaNv5gyhRwxZl0

5/DTqdbJg3J1Oyfb6k3SLy+Maryse+m0Uuc1Tvqdw9aUW
7GOy/rWI0Q5FGtKWhRGldFD1RYJIUe5s7W+2ApKo+av5EtjrcACiUUsYCcMTwLUF60tqyz+qqQCQ0pzF

dI5XqfWSCUzyvZKlTHqJbHWRz9zcuZN6Nvq6JLjyAFIj1GSIEfFPlF9pRdEa2YSQGO9p7TAehfuV31fL

0IdX5GetZbRtkWde87exJlViHyAqbsBpr8M4hdwZWY
Z5XIKP1QFziJWnEfTcWjUBJMLG9Roub379xUpSMATrL7h1m9kSfKvVOTxCR/iEUlqko0NSZWVuoQiHoC

40qYQlT9cmZcozcXkqpFcIEqhp2w8r6ROnhOkRK+PI4MLYWO0MBbdLMiJElOaym61MxAUWSkDsx3UNx7

C6HKrN93335/+cY+gXM8GBI/3ceT3BZ1yee33YLZi9
t0QY3oT4FxzUYQ1j4QNsbIXE9jImt8vBFmGz1RfpL0a5acA/WvEfRAZlpkM0CiTW7fM/hBKLYnfKk3IB

zpHkDftIKL28APYOkYnFz8IakUyImdXKSX9be7fOENmdlKLobpnRPDhYUaQhiSWap1Aea8xh43NRwWdH

HzowAllQWC2M5Kq/iSmhUHUspUoobYQUm2f2i1q7Ep
B87m/U2B9OlzIJGBrlWAF9JnsAauXIXWBcfV9F5xhKAxgYrLQYs28DJacNBYDcOeVD/po2DQzh9dnF+j

Vfzl8GgT9N6BaJEp5Iz/sL71Mkxa40G78B89zwmas99r4NE4/SAntcGi5rKifdW7t9YIyFo5hAetzbzD

1xqiRUVw5wq1+brZDwo2P1Dkf50GIrFZPmNwE9HRnj
0TYHw4hz7t4/lZZGSpkuS74SxBhTmFmfqzVjwygqSQoOdxJJyQbddgc5gMNyaH6FCx3RVke+5WXvcvux

9jnm0JrPOknq3mdUoB3EbTzXc5TORNROqzkq9nMB4BZlhVHhJ44yPH0ZUv4GD2mCkQPVMxrBvHN2lGfj

9B3XQ7ROGOpI+nmHQ2xHsNmqTU+YTnDFhAZ1WNURTA
hQWuqBqIqrvS+0fzSf97Z8pizmp0zDdE5l/zJkA4nI4fnbK89RI2gApl2c5e6Y0p5omTZUOxuipNyfAM

FVG8vkK/YkcTGXrFIDcLM4SN+bNW8I56JO0bacVdp8PzqaBV9KaXzm2EgXBK3KQ5/JlOHlZ/tvkfOGIz

ej6a0KCOwyjgo7s4FghltqGat8QEuEroLnGAD+QD6w
Nmfn4hKElL75W+gm38IcVjmgklYCmrE9RnxZ0hBmcBOCuHmNxWnLpTsBZBTuHe2zjJupdzrJ4bmLaSeW

x+00QCEZiFJWsgYEJR1iUv2qX1st/KCUiiM2+evlV5naukJQ5M58w8mm7Yh/EFMtX6PJq9he8wt2uECI

wLpuInrJ3K46zOQHbTxmjCeqZd6W/lAfO/MM1bTXO3
MQi0nUJFUXe96GBLd5xd5xUvE6cdh+VKb7izcWlZGTKErCXNcnpt9Vd0Zlq/wkM2/8W8Ctkd2rpSI3eG

D9ByO4esaYcsghu+zz7pHYMFsjsXedNU/Imhht5iR0nJEzRTVXXSmPs0th+2IS4E7czLnyqMIOp/Z+Wf

kKtxxv0mNKWby+grzi9UIgtHRqnKdF6Pm/PIJDMMc8
1EY6joK4mpA/DD+nrDWVHsC4TeEL0+UdMccg24kCzQBRhz6v3QlU3XAQ6BPTjtBk96mgzaxfkzerQepL

sm8crceEyPGUSEKz3Lk6VzmBZc+kxYVaV67vGugP7gELF2X7fqL91XiH1wX7rd5VKWadAQ3QXBlBhjXu

nFP3EriWBx0i28e7zPy0BDafKykW9PD598jGsYNYB8
yevV0SszOFl6CWvJu3zMVSbOjTTbHXwlN95J5KysBBsW97j00NQO6miydxC5Kkift4j26wXePANtOiO9

YOBed+4I3kR2uvPI233x6nGSm5lH9cCYfExC+pwAJxVuUE+vhvORuLXbQZnd+UlcSIaSUcol00QFvOGl

lYpsI49guREHi0r7s7mRlwvxFWydb6ikzoXeUP2wh1
Ek1BO8pwRNd9GrRf9L6lYBS+g/twiB+9+xTDmFg6akFN78cLNMoMHeL0wAa8c8D0CbIg6hESjxe09wVl

JFhECrvi8NoEuBX+pbGPuq7xTc4BxBY04WpdHvsjuUG4dQSW8zcX+Jx2EPn25IVMsaHY8bVEbIsArOuO

mqTageys/C8TCTAetZ+1vGyqeGcSQpDchGxaqAHZhy
5APYfAdKiPM/KFDmruUlytypmiCXPQPUeA+/BuK+qQELuCD2Q4/Sf+4RPkgxD7c14vsHzc6KaYUaIz1w

pzvWfD1yjqGmlGW0i9TEBcVRi0wMRMlMvDx/hishPjiyK2kZt5hYhyl/38VjQAfAgi6ub51X/0ED/Ny1

U8iXavBH7XHg7aCW1rxPTA5nih3Y/rzrNXS3TCa7qw
/In4q17q/JcTrugLhon2+rsujnTkjfS2hBnMUt0Q7+1xid+H1LrC9UQXfaVhTWgpngor/uV9W6h9NvtL

qzTnrs/L3yWWmucGD+U47aF57Gus3p5lGS8TcNHeGisumrI6m2Dl5dc10HTRg2hHlwqy1nNKrrJMkQdY

EhJ+331jqZqd0soBl6lbVUCEu3AxSkvwMYnIOG+BTI
kE44R1vXyzDvLQTNt9FgzRuiwpD+izbP0l1JcG17hE76iy8+JDn6gswb+3QpExYLhl6cqGkxFveUgWYd

n42Eb1bfs3slVWRErCtrTHks3lDk1qm6caPUJLoznSBR56J0VtBUjfvrTl3S+vymv8gja6kVLxR1cS4x

km5677Pbz03Eb40b9Fru1wyBn2MPsWzYhSbEMUe5vI
jr8KfqAPEooJc8+aaWYDQbeIG6Io0y5FqUm6XR3b8fdhvkmK2LSSb4FwFLbfXouBcYANjEY/bA2zjllF

dUYwePGgp7mlNPd3pEyj4DkbtuDQoviJMWEbhLUqbbA/WtCFDw/6zzlbIWw/UJVMQl0qtIA9CxOhETEr

/DV9FcRxEBCoiZsJQKY949uCY4O68TLP0qlgobzNtk
hcTQETvcWAHwa11tTas8G2iynwyBPL7qQJazTwVnX4qhJiSduAq4sfGrkO43aG7zBu/uWR5VCdQvkxB8

TQTZs+HDDL3mL+zmHyZGFMgUUrTRc/hKNF5sQY2FWLdbLa3WT5b3r5ShBbT2TjF/GXsmXd7LacuOi6gC

uKJS11ykrqOWkTGcy6nJBNkeR3BHqRz8itp4z5VKJw
jPQilcOVLpuKe1UOafWporf/W8t1NMxgrAHlilY9DLczpsW71wYHze3i+k81QMwup0K+3M3o5Id5jKx1

IQVxuMu7F9+ZuiBg8uoySGQl785RSM+dPjb/e1vGxMpMYbzhGsr9lGB2p1RaiG6mPW98BxCsDLAa1OiX

hvNY0tGnLnM7+RPVCMGXBR47zix222agwQf1b9guDw
rkun2voZogd4+B0f9bGp+VgopMb0oeeoZL0RsASgEthkwN7C26k3mzOi2fvyQouB2EML8+asUMc2+6tk

2kwEQhvco6mGhvfFtv/Y4FEP6dWdtq3ugrGH8X8b6eRGs26IqnvWfBNacabD+/Z7i9ZRf6SdycRHyolW

uihh5bLo8xJdqwyvcSExkUneVm+mt0BBdwclPl3eEp
qxbDMZ4mCEaTM9neod/PPLMjWQdyQ1MnOQfGTq73vV9LGbZodfs0klmgEZLAuvuLf70GFquhwL2C5FhI

daT5BElFEMVs9rJ4TH8unXC+lS6FwHJNudDSpfzPftuzMnlVo/PL9M+mAI4HWzGu6MCfU9dBK9jXQWGr

6bTsF7z5Is8LE4hDG9iyit4v9g6ysLSp25UOLrOYvx
u8Plkz7OJFTwqwlABUUxcsKMASHUSnfwmBQvygEJ4z397L2DMolY8J0xXvWFaHgY3GFi4P45JKIEEzaF

dOEBS8cXknkTYskbDY+GFrVufhqTN15/jtyqXUchjTaVcrb0hDlpRooleBMrJeeQ8ZHBrGs05U79QMxV

POz1TAW9HXCF2qqd7++o8oJEVkw/m6T5/5JeqSaM4v
G5hMS7/S9vc3O7KbBWUNd6m/Z1h/mbw6o45rPCgfGpgo0+PGmj6so5IdnHKw9uF+RiBscFJkIdQYLsGB

BTu5ANY7WHHlezIKfaaTeG1RB57QN3bzgPlhKf93OFqzQMc/yHQU0ChDJvF2tHVd6EryN+zK+Tv+1zcI

Q3/2842uLSUT/LBCQoY6aSxsp1GFnybQNV+AF6S4Fs
AQ+g5/kQZTqtzl4QXiXKlSZI2b1EkT07SOb7T+pR1FJAJiKRB0rtkFiftFqTaWmfuu5PN5tJgLWgitcK

VmF3X79AAMSXYxuUTxMhuzI8NidQZXFzhwgsYfaMGbQoKitKCUIEiC1OxV+2BU0Gig2cEs3lKVvuDeMw

iJVM3KVRUJD0QnPs8D/3WwDlI/SmVXGKewTHND5bx+
NxdVP+/oEu6FSE6hrlCD5/OrLWXkhL4NMwmn+mJPP4LMxyq0HcMAHJTxbY5uGnEcBhfAbSwIgWWvBYOI

YuqpwfapyNAp367HWz2vrzOWqljZjeQb1q9PiXDryHQQdT95pwG3/Cv7s5/hdfPuf3GtdJJqoFcLhnTg

RAKUrmFNbTcmwBIfx7oZW95xjx+x2xO6C/Lq3QntKi
ffHxE/1e40TtK5KHWdleNG5yzzeElA9T2VFKwCXeO142SfCwD9t81LZZ/2BemRdBHGxixW5un6ygJDbl

SNO5XjWLLOjpSaIllIUaEk6BPq4l+mlfsWHMLcT/WCNVYKcPSe9j4CZ6rbETc3Mmf5l6JpoEPzqUZ83O

vv54ZIUSC8fyyqFlGzrJXjVptxeoPsIqqbS5++nEGK
FkJBfTH28+XblHPeUnhcYr3nv0cJyL+p8fT6V6u1Y+vq+xLBb3CKuzn1BrtWmggUZxba9CgQ2gPHUFAM

h45Py4V4eW5tKLx1fprgxryKfMdGhYzPG4bEMguI+RefkHF6bsZf7LxpV89Lt/grh4c/CUeugdge0NTH

2oO4R0leUX1PPofdbItsbU4Q2d8X8U88s++T2KC1Jq
yI7dfZtPiDDksAh/1oTMsutjklmy8LOZPKyMhwh3GKFTV91ex0cc2klJSY5jRq2k9Frza5lWKpT8syzC

0rYKQFv9zuEW17h8PnLIC7bZxx6j0o+r7GvdGTOY9dDJcI2LArGhfYF9/duUC3o87HrgsX9vcTKCPqI9

nGih57jaWyGn8rySuZFmJ6Sbxi9eCuzHpCDjPM4f6u
9T6OZbDlGnHlNOsPdB3fpisQuN1yymkNH420eAULAvwBMt7nO7xF6uSTNeXLG4SpijU/TcIaI/lI1g7W

FPimB1r2ttaB4qQMSdlkmBNypqCXF+Nl0wzSz7SFiEkNG2Jv1t9V0Hbiuu3HLoitZt2QqDu696tXr8eK

f3sMvr8uiTbW3YIcuQ6vDNH6huYxPTgleiCxoHH5O3
fL44xisph300yaoKSwAr7xZQmI/Tu9SSpd4H4Mz1MhIHyYnFUha30JQhr8meK51ohvB9mkkX0EM/VSeY

+lTOrHKfW0Zqmtey/F9+eIlYxMu15mjxyAoLvsrRY+grO7It6oZ3LfUOIIyiedjrJ0lz4/C+xgUya9HG

L9Wf7xik3n5YKA0iQWDuu3k2fqU0tyOjwU3EUlA7aP
/kZUgNlH0yMH+fABvSSUF3G51pPx28PaeH2Dla2iC17GnXtS0EGaCbWpeeecw1g/RaEEdxkG8le5PVeD

23yZSlEUa3IcV2PkCB3UpaU6Pwhe0ChuKpm1De+elK7qJKoZgU5lsB6rd3YJqi6o7mwg9MUFm8/55w/2

Zsz+Dd2xHvoz0HgXGfsh3t81MzUMGaq8xg3Q/mLi8I
63sdV9EBC+QbSvL+e1nkVWzLwNo1iPGFrG/tom9f14GN5VaP3cNIq+mYaGbamnnwQKBGdpRhQ+UrJZG7

I9zVg8VRFJAveHD6sbvCgRXXmDWDY2Dkp5aT0wVPUi+4eVvIkuqTcK+TeyTXvuSZI+fEMX3segt/ka0I

kosavbW+3TT7bsf0QDgsqwz8EvQbWVbDPboZFbDYCc
WtrKBh1K6jRAB77H91nxvaUoWle/MJS9IKMO1XQWj4DGti41B4HRFmqi0hkaK4nsCHHginyf6dvpcSex

tVjzTotZwofrX3GieAqrkCLjqtNWYyx5KJMzwIHtqc9QP6olR0umGxV8FXRyfvBevLVmitwmO6yO0oN8

8g+umKcadyTXZvGtXzVrGpKC3YVmTk7yGQ75VBe/sJ
OecjUYCawRv0QpsCJauzzVOKnt5r0OLZqP+/awlJe/HQH+wUtKRW5E4d+M6DO1MTgI8A0Gm6++lRnWvM

nl2sQgHAto6adXNudrw1qjuTnLiwOWIdRwmQeCblxL4gtW8Nwev+iWE4MaZedSBWBjlZkzh7fzqV7tbJ

a1df1DVVM1OV3RFK0rWPPRIzgeKOMPg31rip75YG2t
lh6eitYnx/5wqGpbVeUUWfNBuiLqKCDA4D9Wn/pVUehRBDJf3FQvrJ0Yc6qrF1G6c0Sg2rERgyLrM7vB

QXaVeHypJyapadgFYn6DBjTHTuNip6/l9qETwCMH7YGi43SneEmAteP99ZQ7HmcKSUOmMDTunaehdpdF

45hKx+pAkrV9huCClmNVbmdhA0OZpsBsImRac07Lvj
5MjGrynUzWxcPpINbdpn8o98Bn8oF0b0NxQC2b2oRZfpoAQiNqJ/EQ94BU6RkpCm8kZpic5dbbrqG/QX

rXreJ3oIVKa4DbVw374tJQEJsHBc9ae8SlVFqX6WyPG66cf1coq38ZlJKjfpsySUGEIbulm22vddb24W

ioYp1dYuz/Ic93TXdmujbnU6Lxx/MK9BPNTsqU5kxc
QHJV8tDgUz6r2qCB9cAB2l2dlB1+Yjv28YeWnYn4Q3enMqXmzeK8b4Eyr9Yw3dW7AeV3mTRAbkqYJ4GW

FJgaLwxM81c3j7m1DFw6moA2okx8yR3G+uOqlbBVjiunk70gmWJ1J+ELbQ5YPSc8twDTxhbUdSkFt9Ga

u8OoYCKnkdJzbfuS+khQoUd0fFKRPVzD6dtKaxmfjb
nEFmHQvLPL/Al+PCqxkOZYfvYKAcO82Bcxq+02iqtC0Wm8qgNxqFhhGFnmK6fh3RFtURZWmgea42hyEJ

QDLUej+F+UgLVJQcXstzqcztH6Rm5mL/PRIgHTQhdq2e7hQr2B/q9OH8cb/XeFrNXi+gDkfOslcdPxen

oZW0P8zIq6DZW/533r4xZEb36Kcx/PJ0fsynOTaZy0
qMX+9ec/OojruNJRH1iEk4GhwaGbisTNL2UQ6ZijhO9ZpfZmKn49d3MlEXbjyT6LQ0btNDA9yRK/DZAD

HmuyiJxWnXgequ+7GS+Plg2SlYfywh8zP5SMeryxrl9cVrAzsf6XVZU/DaGq0ywKpWc7rtlL+whB8r/s

08whLeBig2Yb8CpWwhYY7BZv3rq6+YCzLYSkmbYweQ
fifmd5wpZOQSSKxvWCwnikgT8Dzfp27IuXp3gKoXWYbEm60kvHx5cO1ZPXN5YjnQaOoz1+jgkYTxq28V

IbS7/HwFBxJ4J8c4ttLpD1EMD7OBy9h+LGsuOAx9oLBrlls520/ENp0Mbo/8FSr1u57uv67ovVohxVL+

uJgFxu7Uavch2KrEJ1NOyk6yzPWbZszwkbjCnRPK53
b3/UswOXSlxKpxHvRRC7Q9gl+8lBzRUd3xMBY4lKsfl7eqRCmsU5IGGnYvaxSbXo+T9q9Iv/HfHMu2vA

XVEBbCiligMVV+5C/t7/T3pXt703H/JeYr7SkxseR5da411oi0oE1Jg0v4i3CzA5IW6Va6ENM6YXpSGz

yw7o7AVfksz/1cMDseF672AsR4H7E8qa0J+Iied/Jq
Arh3MMTSfAlLrJyOjR9ikBhZDl9YqT2PkCZIBktFRLzVzw4yZNmzN6d4cpYsBRUoi4pdU+19dwurFkE4

kLs6WFOOo8IV7qwTlAqCre/c7hxeEZXCtMmqlbJNhoo7YYqSR7tfehBn2NjIziTpwd0PDxM4twIzrzrY

8zWNHdGMOzkUtgFnaf69I6e1cTTjWlXQjFxQTNqgcP
q4y9STb/gSOqWcqWA0+FZBJUHo9zyxbKVKvV7E5LiRS/kmzkyxcaNtTVaXdOfahrsWyyfxws/PulTe2/

JdtnqFaVv4esl/p7gg9guuxdIQUTf+m2CLu8PI191LGrE4gf97MCWZRzT2s4zmGXR2zoUgw2NU4La2wI

6RZZ9OaQGbVaerXrocRqC0TAzDwXolVeNrwJZuK9Ck
yqyBkKpTYI2Kes8FH5ESoA4m594rQvBuzgP+sjI3EyMCbl9GqwKp4U0N/dpvXqf+/VIL5MW9NMBCZ3SI

pHWwNFupZSkgtbyjK0XjJj/x0+QW6+ez39+4usmgo7FwSWuuYz0+3cfwdf/51zaHLwH3UvPfI6SXsK0v

/gfyyNXwFDVH/pncT0VPRTd4z1/m0T5u6t3iMGpyBL
PxLXXIcAqK/TI6v8W6vjwc2t1tsGZYywODM+xT51UmOcFZTayWrKjPGfXF422DjdRjyQXPOdzHYsjsnL

alHqAhvkjlV3UVjE+8yND/Xj0tx7LRZxiKcucUUJqdTkaDCqKYbdSntHW0SK+dGBm/NKDCD+0WrWQ3sU

Nzr2DjEplL/4NAzbuCwt/UkQpAWkdKzz077PRVuS8Q
3I05m5eXzZG3wRZc5dok0uvvx83v3Oom+/FxxQf7pWGehP5WwMh58RJP5Aplvc/xmvwsvFeDQAq37I49

ix1vhF7njURTbIQ6PSZ7hyTq8YaJSTh50VyVxyZ2VyNw2APSDJDlsYgaWwDFrEL4snjMim5Py9+1g7NO

1T0VHqNUIiGPUmurn3cC6Q89iW55kVTzGEe8XVyGha
HpSDFEOTUjQTcLZE6uuw/qLznA9jgFps12eWv+NzjmCCVmua0tgAVTTznssSDFCgyUqc/CYC0KqV/cn6

+EbRW9lHnuIgIwmh/JuLdkE9OBOr6lW+P0NQrp0m5tkVVsjRkb9ufupqGOehYiLNjAtXn+bmt7ivb2fO

zWP48GGkrpPZDPDLoJxk1xvDmKI0Ps5bME+5rXRlTf
UKNjoHbf71XV3m0cqYG0NEtoYhZv9gjLlYporuNzB9vtWzJud3642rLjkShyFiw3PtT21C3/ZW4SEtWq

xKEOORe7VEbOUCTfZwlp9MryHdsXjXSGQGuqcN95OiLk6VYLAdGEbb0Q260wS+A1879HP8MfquoAeejp

i2n/k/z2KvMgbLhtHK7RmL1gcGATe5qQcq6NIJrdV+
+Ed7rJQiIS4vJaZW6Pm8gkhY5uGa1ARkU/hRmRPbe2k9zgoONCHdtM+Sw/5Ety4U3+RHlrMSVHTsbA2j

MfPGqreR6zBUFZqJmjKpqBWgQi9vkDhQ+KxTMNTZuvi56kfErXVqIpG0AwMfCvc+9V+UKGubltI6bWdQ

h2eSAXquxMRr/DCHYlot+KDTFdZo3WAxgPpJaoMh1P
nDe5ntv4oG/ACnLiWxZdE0qLqCuN9aivGdiMQ3MGXsnAqXGWZ/vEMl1PWGVfK0fScTvPp/wgrPP5yw0d

ihe9RZUJ9PyChWjo200EVpRs49QUiO9rYHnWUYB6ledoACHUqxyqAgdD1loO0zqSyTfl6J+BKkjO+VS9

PZfQSLSQcBuEg2L4QV6A5LhFLtJyH7UosijchRm3FX
qQ9O7zdzQ+6JSnzcXSFYYt63f17xn9gINdSLusR4eMjUFgUhCsVsfXNhwMjYLTzaxosv6NlNQbmudjdW

REY6FTqCv+FfQgxIS1zWvAcppNb1IQhpB9OSn8Wv/fF6WxI5liwR4ftBG8i8VWRmbpb8vqzbSNLpfi6y

c8LEyA5W/8p5uOiph5kpNi6YfT/rLd5ESc0n833hC1
ExB8kYwSPa0Cec0Aw7vUdYI1eQK5Pi9rj0RZ4hxXpqbQGzP4Bs6NFTsH/P75yoPrgiB5Z0oyKZtN5vB/

DrIqx1oemA8kT76nJ0BgYLmYzlpvwIYBuFmM5yKtSqJ1s7lgNdq/t3IOR2lpOAppHUI+AFcice/LIFRU

bktvSe7EbAB1KMhe2mLy8qieMOpm/x8zX3YFrw+JpS
42MuIXiT++0hDwhqA0Et1iKr4WsIttwZfKyPHi102l5/kUiYyhqT1n5z2GZKrCIqh6vhFnKuL4go1A/L

KnSs739im37z2IdCMzmMh3S0ekQEIEmdVd1x6t/FaL/8kPxn37CBtZADFd5yx7rZPH6iy/pIquPNRDxt

KL8GVqv7Gfitn8K1C0Y7Juk53XsmwdLgnahB5zOju4
G8P0yKyYPN1Fjc92yjKE0w1a5t8wmvSIpJwbxvEMLO3zqdyWvRLt5jbTOv2ohlxozbO9QuHnye7Rc29o

F5E77OVT3QDFSUTQkX7XP6uBw9FnxYx5dICbrP4it1s2gNmPPOu/8GHEAbTMi1ngSRaCvbJQv/r6LPzp

WCVEU0xtosMpS8/RXWu7mMHLDtmKftgKh+6idXbje2
VrAtOMWlcysTYW09utT1gcMCm5VRX2yOk17x+UoGSKcCyIpT9C3E0P4L87PpQpy78RkWLUweXsVFDe40

5fQD9HrS2+GjP8/ze/VKz4210ZqM2viY3onyIRJ58Y6P0yUjT4ry2bI9Lco4iCsJjZR/h532G1TWOxah

kF8a9DI242J3L+cAu3RxSir7oL6E4hSjPw9tNWd/zU
X6rlF4zDzCz8oKNSalAqpIDtmqlREl2LUK9aFJmqZoC4UjENuQCFGLnK9E/gLbJ8JHSBMb/9dXnZKq+0

xPclzsA6yObz411J+brS9wlgb8KrajEmS4ZyxJWZK8KCSmxDleh3somT/KeGGgLVD/gw1x2HZYqyDN8X

iQzX24fR2Nhc9F6zCiutblSwBs1yHvzwVXotBrWq+H
N8cArsrBW2Ryk38QA18sXfLLVYvqmeZkmsYn5+bF039418v6iTCD3J3gYqqtO/cEizE56xOCcdwMcsr5

613s5euBmo5qC+aeDVBzxK/6/eM2LyVdpb+TmMYTLhJNDy7skRE56ciNrDd7ng3b0GX997qlQJItq/ar

6t4xgrRNJS4pftZivxCoZ6d3OALXWc9lcFfjSzncmf
lLcg05mrRbe+lL6uNYsSMlHi5FQ9h3ZX5FNq6MNFNeiHBZGVrvoNgb/j33uw6xMcKnDnlzyiVXPgWHLl

mafs7XUMyzH91F0QmydHGCSsTU/hJ4CTmBY3fdsVnStr/V6cn6c6LHMhaL/ovNKL3tftqBljXIOWvxo2

V/ln431T60DMayujQmW3yvxDnBmPmdWKYs7Cnu/qpx
etZUt9fZJUmU1gTZHkyhZhbe/EBGkdg6Xj/LB8Qw7FMrqWR8/5IOfIq3kMH1OOuS6R+ShstnQl8HQ3dG

0hyi9zuRsnYD63kjAziw/lJ3gbgPufT/hcDinvy9si0bnqLVHzF5w0MS1bVpj62EIizdXpm/EPwRDyv0

bCnjX4YQFeI4Mc/lDDJSnHkqQBD5+D7ugqnnRJHAEq
PZ2fubv6HGy3v0zIjI/dJkylj3BT4sopWsg2ohjZObHU2JnBlpnmjRcO+9EKnl+gDA2YwDCSpeNMsnbK

VSkDVLETW+oDlFb8Cj3zAeBsIXIP0cAdiy9Pc7s/JZW9QocVkfurSjebwof76lUhXVeHvwQPa8pi4KYr

QqxdvNnQnmmcW7STH6Y/3TGGR6eOB/N3d35qQJgUxH
bGScKSgZWoJBMpuUOKbz18QoKdMmOCdfN+7feKQWNKmkEEI83tUG/HQsgzFhuUtkgz5Uomo4jtBLuh6u

PWPVtxHCIMg14d42BWTP9FnNQiGE/Pn4Aw9dXsCcZ8KHJx0dTU1P4HpdBxvsept2NEMftBhzFfxeaN46

ASlBCmzcv2YLfMcY1WTfJBLOPzRGOpN8SCfLgthWnd
EiyI7Y1p5phL9lfyO2B/UDZJxdQ8D0HvJHZhzAHG3fDB1A1Q/jQZvwek/T9cH7abMYBi3nCJ98yZiQTV

K0irTOJl4ZHI+6CJhlFrYYv+B5U2klQvHmb4h14zwJQ2Lujf4jtukgzBvFH2B7Ed6Z53Bpr9c4VnVuGh

VDL0Ba0VWwH4YXG2TnD2W0piCfO9nufSWx01E1bXFm
di7ZYFR7cMee9JXkq+2mk64OiIoRY7/4vxyZJ6/DY5BCdvmnna/rOf6q/VXdCPfU+WaLc7psMq+EBqLW

7Y1DcPnP9X5hCUMlvYJYHTY67MJLd3ouf5T9L/KH1cdkXNFAJMygn8GcvMCehipJsghF++/+rI5xGM6s

zUifcEcWNYIfSucTzgldkydp5r976d0ccjil7xtHIc
6Gkk8+1GDlWWIB2wlBxcljqiE28J685wxaLzyqWNAbtrJlsalG2gijIjRgxU5zmdowDjoHy02nlmL9Gq

YnNa62desVWgSDcrHFHo9JSsCgMe1BUwPYXpOcLDScY+D1WIY9JywY1HiTTuxpvGO54mEbxP1X8QakuE

aamU2rBScJPIoo1eEa3PH2tNwJsIOw2MLB27n4iq8m
a12VBuOSb7PvBUbMylSS8jV4MSgKfmr2+QT+vCO1xiOu3cSBb9WGs0zJEAlJVbB3E/ZIDYS1RhYZsg/T

w4lPT1c43B09LXsQ+fbYv7oRvclUo7N/TykGZAHWp9kCmZqG2rIlnHULZ6X/Hb+kpurGvKyD+e8QbOzS

RJxoiXAlOJT790+/BGOW9UzEtOMlrnjNcVSShzWnYs
qk4RiiIK9K6ESQWaEyQeTHn84NsYNVKbr01xelPLLwwrzQuPD+pK4GbxX1ku7i+bCzaQQwrhE6CydF0R

CfKWDDpWOyFM8FHFy/7nvD0uMN7fyMKkAuLDssjFAeHiv0XB37D27vHf1ztvrzGDd3urkehuQK5gQter

jMWboaZtOv/8Q0o9eF0Jal4tb+76HEMpZjXv1duVmT
ixQQgOmk4VX9/hx4/oCOPPPExgRCR4j93FaKMO+HN56YiI3orO4ezPcqzarFCGakrRtNfT2XtIIYQ7k/

JZAhQOtqImFFKGPp/r+iV9fad5hek+qS1/QLQhm1HFWN0CfPPy/q+1Dyc41ht5dg/hNHNyZyz9eMYryN

OabZq6vceD73VZ98AZthmMmzkLjAtJ51OSUBskowTw
0YT4M93qGeQbfF9eq9ke1yWmwTk2Fp/6EUbHc+nWFFxpvT/n4YqRkaIrJJEudqTIm+9WzL/EUXqjKA5U

bPV1Yf+s3tTmA7oFv6KFOX/RmQpY0+gmze6BfeaNVs+29X8VS199gpkYnU3mlAWXvXoRpUmMYEwQF5hy

Q4iUgoWtJNHbDWO//dq6WmPhtGpCyihONLhQ4x4UMz
d2By3Rg/Jj1VFMbffsGRLIIecn9RzH7Or+rQ5WUzXxfbeXU9j1z6V8bCHwz7wZaD826UQTvimUArsQq9

sZsFA5EvE8bg629qkyD7qSm9AL818Tzvc9Vk9YNvhNuH/6fSDUItrEsJJGIioAgaVXsaH9KbuB/eD7LO

8oXYp3JsG1x0LzKEDQyV18cPrHF0dzns5NfYxY4qBv
E0cWb3rcNAAhPxs2Zbgl+qwQMZrSgwSV4CdeW0MgRLFKPgutt+6inHl/vj7gAaRsg4rmPfaW9kKGNmOg

LeQapftktEOR5SENHeU/hIbsJVH8yj4NvcKVVxXccxIID+Imq08YMsND23qDldMv8L98DbugSD5elz9d

Tey1mrP+zbdvJKQsbUuMkWU7/jhNmRcyCnbJoj6Tb8
mByqJbMK8zy1DkSw9dW+JMF5zl7xhG0mtlrxxM83S8Q7VHpAqQMDFLtnHmv/YMh7B38ln+EYZr0tbfj2

NYYxlCxXoq4MfndOL+/zIS3nfO5gH9ITzhXfJQ365ex4eoWAA2y+YdAvOdhGOFkbTvJxRC6h4Vzj/foS

VG/kiGT8ifwOlCs/B6AzWx1qi62SfwMu/cjX845WV+
eC4y4Rw1GGGDvZxucqP8eKJqqLZds12hVlCn7G7RkXHslqX/5NcDbpQPqc1d7sgpS8osPcg46YMRE7g9

2r6+NJ/UPhLQrBNZnLjMNCXZ8OCHS3P88+HZBTYp3TwCZn06djI3sN3+oIOmWhuvXS6fCpkp7F8Ziwld

HytLaN3mivKfxAbIX4vbKX8FBDbVl7n/qG8QdUpaxk
VYRhOEMHm245iC96m3JCCszVtQ9bzhXfTFLR6tOr3HxIFQZAAgZEpycYPDOsbVnDa+C9FCT3fMP0nqLP

GbCfkuwKAkkhCbIoepgFi715T8sWfgdZv/f9IpoCIEbL9Cnge5UfvFaGZuomhDx6z4CeobD04h3EiNIt

mM+Xd9Kn7iE01O6RHLp0R0SU86EcAVoNwgKkhHq+4a
58kKuwnpRmjzCDAjeX6ZlpmKm6mAcD0nGBd6E9/rzxHYfe7l9e7V5WM3tBMqFPbz01c0zpm5+JzK1b2U

PyHQDAaRyjbqjGYmDtg6N6xoTC+BjFxlmn+HDrL2+XtDxU+B6hQtzffvoGGjP1tXhj8ABks/EtLrX6TG

EzDkqkqmR7UED1lqnU7oYDmJEt0npNM8DjMRpuPaf0
6icWWubjLGf1mmeTpe8cSIRMih3vSSaios8Bzzln+Unoge+P1b4/7IOnnQJW/OprR68fPhskCxreiQef

amb9X+v7DFRiJ/Q/ZqdUUujM8FmKUS6iEXRmxSjJXYtucUUZtMQqAVjgzNOfVsr2M6vGCPxqaNiq4oz/

Pd079+XO3Jn/y/2kV07L1+x2lswlwgolCBI+n+8tuI
fIagdhxbznXiqa7SHLT+Tz3j7FOkMWiJ/h6ACr1LP5bRMtP7C1Apx9Gow4qDGyGigW9OmdOwyyWCV4oM

F4xLkPwbMe0fZsZ1TARL1Od1BtMSfMpLvYT7gHaMpCwf90t9d5wFHmh8/m0PUpLy7wp+0WIIZa7z5Rtb

/8ySTx3QrY0id49d2uIW7BzzZjDAktsByjqxrvb24p
onzYjb1CYpruWebj5DEmwPFCnGJxohf43EkPPl+C404RHv0xoKfvXhW9J1sp8eSkpph5V96g9yGzhJ9C

mqtxKockHhPFr3+nVyKNv/06gP+Coon/3svyJd1rujzNWKdwVnux0VMxePXDfLgik1+8GrVZbwI+A557CU

EMwhrhRsD7VqPgwhGda+9c/ERdY5mwquc5w4PA76A8
VnQJrKJyeY+wK9aiqtnTr5EWhqjmW4w1j0ZY6+VsqF0rzNH9GHM3pgT3Ew5dFefAl2FL9jNKX+ftjHOu

FhEv2OKCBaioYOSuawSuI6v5fCI20r0RfNX2WLd/mDvNPGkBNAKAQh7vW725KG83h+Guj87kzD5xT6Dt

LSBJntd957GBWnX5XTnd+qDXofTCZlECS+blFQ5V8N
lhEuqGN1Yw6BQOrJ+XZoIm4e44GRR5YSHojTWi6MLt/zO/s3j2vMmvVWITRNc6lmGFd7bcEtYMx7FpBp

mluiZQblnC93cAAuoKE6tBKEY2VWbRA+Ok4UaephXAfYyVscrs7WoKltL6nAl6CDydUCiFp/7AW3ejLP

28Twik+D+MtN1JW8k3vm3VGymIFb0cDLjJ05wdwtOc
BTfa9PNqIfNsIGq5DGDR5VACmK002jaodEGGzujl5BOs/07Bc5UiYRD8zbP2kt2/65i/Rw7KqFeDR4CX

oWE7GV3JOy3dwXH61fa+W5MZgTvHHdOYP6PJWPos586QY32iMbtXffZaN7uvmqOMCPQ8K0cRWjKCiSAK

1fkVrAwmqIgkq3Hg8WfpkTpb8Wwy0/w610qU6LLZfX
5C3KnpY5sWhNheJRtkREDOQy4jSBqUQyuk41h9SHhD4fFNuCu8RXaeMhT3pOMTVMVoc7zSZCyTgU31PY

JYZxGtdCxXzc1V7KrRLoQb0Y2EQWG/GbNXkIQvyZ8wH5iYbchv6WATW4gmppmXmk3FuJlduKvWbuj7vg

ESDbJ1WK2MxgVVdnHlwp/FDyQUB3N6zshiKQJ7sRa9
5DmEl8czO+4H/PRg7zqAoCEZlp0+5AXSWAC+wOVBhdJKZfCk34n0K9U1SjhXJovM+CC3VO769mHpDRKf

JYOyxJLTrKjJ+ndsS3tcw4b5jccu7uesMOjfpp5acyM0zmz1Ovi3S85YZWQ/90f3hkHjNF8V680aXLgh

G3yK9X5WvEPlJStpTOprUKx2tSgYq09Iclekl4/5p2
rh6TsTuEVyXVx2fMQancdTrObhLRiOQu0ZO8/M2lN/aEOkXu70D4UpD0y80AKyPJkK7FphHFiQFhWVGO

HdiNo6OWD5nfB5/RnF1F06IkxywKy5w3dMY9qB5RjWJSobMXd0DC6LpY2Nt/bYLCawszLl6naoT+YZjt

uZN9yleJsBq4U/FTAV3a5pnyKxV0Ka31yW4a6JQ+e+
v7j8jvSffCk4drGi+rV+RPbaZtfrK8eGUeb0yZnlFD8ScMDPochGQQhTlhjIP1tlJNvKKX/j8LMauOaG

keKY0GDSeSa8wtQuuJ1DgGJgWrK4JEXsD2+3hdBvnq2xPSah5a8lFkt8cdz5cbpUxctbhJja6ZcZru/s

NK51RSLv73ZcABtnH5h0NuzUgFhsQB/pm7kiT9caas
NEimvIeYhiuoaBZWTemqT42RAYuJ5ZInkLfYQyAJvx9rC1yFfHGTAS/lQCy5wgf5YiZ9VspqIYAnCyHh

cHtbu6d90UvoNpqQyaFpLHMV/emn1j4Viha2LMT76AR02AwqmM3LkrC3oC11oefWi9mQMNC04zVw/Qtd

hGGLKfooBy2AOT+aDof4roX9l6135drBY/K1flwU6w
QPzNju2Q4tKyQAZd87xMd16ePl/vmsuq3GV0K1sON8H/EGttpCLHflI0xfP7aQq9rtqC4hX3j0gUcGja

wTDk37WtOS5r2P9vVEny0ZV2YTICvA+aMpbKHmrjLkZiRmlIIEmoaXn9zKq4v7+x60GyPhY0O11FuJ2o

Fvau7m7mFd2IO2YWku1eFJ93yT+Jed7TX48dfFe6li
S+BAMnNQeGSkdhQ3viarE6JE7CK8X6e4MDshoKt7iHjQMhOnBTqXYY3iTeapf/108rDId1pnZy3TEuGq

cRdzYVeT57tvXKp8JcFHlrneE7PCLAsgo7on9tD/Q3hRgsW+ZSJl2/lBTV24yGK2Ue/MQEU/SiQMtvVr

logA85JjkNd8jsvYA7gaUX7d8rmYwB//ucGXb4V7el
slA2YfE+xSNBUMMjnpto9lLXEgGh6W6d2nsHemNsz7zG64QUDkh9iNd/nKCAQIX0ciGVNZzAu9OzOr3y

h4buuy6N4WnmuD3oirqyXBcFl7wQTtSV/0ncObeSC2sSMmV/2kmt5NdUEJhdxZdPpzjZATuB38zo0hEi

uZbdGIWUfoyoOMoh2wiODk7hXgDvCyOUHE1bdp+cfB
kk95prgQOn/uAV+y+uywgDYrKG33jyItkvK/IlGyEoFuP5FncaQmHUp/R90S9X92zysaIWlDuuMfiAzU

caIiFfjCd6PjZeH03VPL1kx1tVN119uBUaMlCq7kpmAd9D5/GjEM8hxCtEqibM/fn1FUlDkBom+5p7t7

ySKWoZ864GLMA52CEnoo9DjOvQjSVuogrnVYN868HN
lrfAdV7FtVubk/Aw9P/fna/gd2SKy3Skgf9hLp9G+tzHYMCf94z2DgHphC5eIdsTld7Ope+8qQovPRZE

uKiIYRtsHjtOL6sx6LGA8COt+IWs0pIfKLsun4230cxgcaXUHzTy1Qz+Nc49SuBu3mfwPY3ZWc/rMbrX

shD44kAwNzij9PpxqyQQj+E68dQR1joabkFCgC0dSF
Ksg1m9ZlYswht2rJZseMW7LEOux+Jo0xP5VWHv5m5VB5x2jquj3+Yk8T8vYf4sAuPm98TG1ZOLWM9reu

F/K3X65gbOpaVTAxOIXCcaaY3CmQgDOZRJBov2aIEtp8kDHJNp3ysUj5tyxEOa4QZoPGXVcBljNnVN3K

g53yP8+FPsIz0JemGZkZmTE3YPbNu21Yz+GxEE3REH
titniuLlxsyuQxIh8SuI/2tT+N6wfXhT9UkestVvNmu8IGMfczvC256LG2W22xMLch67UpcaJfUdmSLy

IDXEGtdFKDkaxoPEd7qrPNI0Mqram0b81sQH/Ei8ZB/A7yyVTmINjAh5pMxStJCDlL7ZwmLTETkpDZcH

hrgMDskLllPvIU9mE/O3S1GuP+S5JmX7lw2qCRvOae
yfjf+o2eTKaCSDchaRelgM+lgM/HMx5oGYXnp6Srm6IILXCqdJ0yOiXPjSW7CDnnMczHUmltz6qh0uQ0

kFBjk2I8mu7iaqrjZIEbS2n1lwtFXW14ZP0eSWNrS4bzfhFaXbCB53rtE3rKDYtqQd5QWknMghB6cZOR

Zt/YWPvEi/LyynFXgE7oTq2OdRQmDtlpvAtx6C8qjK
4FGDxTOkju8+DUJmBjSQicUVP6qGbcfPGnH1VikYb9t+2Rr6Hf3c++FgsXJekBWHwWIEnZecnln2035+

/djHAsTXhotibyofRyTm91Sonk2IdmwWZckBHhHZBXtgfZCQZrco8PMG22xuyZ8yKQr91W0nNykmIfem

zuUplWnx19oL0iuNQOd+XjfwiD0Q97xlMaIAUx4IvN
AG6p8S9fSNT3JSH06NcnpZ2mHncPdvwuEO3wDOC6ASqx1Vxrp/irDFPbQhcJJlnyFu/v+/YmPtyofQJI

huETWo++kA08LNQ9m3xKuRTJpSMakjDlYote8TgUVi9M/Wx2QE6OerweTnIZF7hV66DVAVg2sT8cYk5h

aLAzw46/wdzRUTkktcLTs3U2lejJ/sHGXw0QYCmb4w
J6qUn3ClxFnc6AnpuIDacVnHte8aFSt4LXKohglkWvfgXNY9pWjt3mLQJH5E8xsRLL25iEPovKxNVpt9

xCAokBUY7lJL8CijCffU+eVVqlAbR6PRECjTO1qz3n97NKbzEKXwqc/wvGYdgifWGCo9wPznv7VOsWVq

t39lEwAED+HqWffGURhdORlAmnJAOTx0St9c1BYlpP
+0b6E9thbbAioO4EvaLlE4Fm6KTTA5dAPjsSzfElrfY58CAONa9xwymkJWWboigr4wu4fpK4rCQkMKk1

Ky0BiXu7sYF76SX+NG5dvcAYONjEazSZW77ibg6+d1JUZUtecW34/nVw8M45SuFP3c7xaPx5EglCvoK7

6ixIxqUj2FylnkiZElSMAKxlcknpUA8qa+l7j95RRT
QVaXN48GU6pQNpwFOS9d6iuF80sioHK/SovQR07SHmjJamuXBmUYFzmRvAKKPvW24Zl/rCumOAnd9rLO

m7a0dcmIXy6z25QDCu4jZ9TD4e7W0aktTDUwNsGwy3uL+D+/Z46MM3od5JXz+rhnDknAd74Ga88PSx1o

ZG3+Op3p90fj2RgvCTEv/zRUZXrfFZ8NYFsRC+lRgg
mO3sk4pXxcs3bE24oyYM+6Pj0iivb4i8eDq9SHPM9JhCT2icLyZuMEoLpkaNHnd7ksZoPMmrUjKCqNwG

uUh/dkXjJd1jV2KAY/XXhKIIzWSg5FzCb7mEmxWL1Uy271o5nm+IJphloM8Us7rkSzHL/jb4G/gajGa8

vlJyR9fydykJnhT5EdrQKOkaGY84v6wib/ofclIr3f
iZJKaIqYirqottKHkOmfOHN7UosgyV5uHWQTA1FSN3kTDSVW9u6YklZm/opYPpS2yJZJEZc7dtwg4QWV

C9tZT2N9Tw6ZnZDsF7UTWIioFJZNSm6kjw5fKl6SJ4X35RhqVmHUU0CGbSm2bWQngbb74oV/XNOvfXZB

LzJ9jhizTWDqviRkvTyJp90MS7dFoh+6cxAHJ31qzt
KNcah0RzsPY08OjPY1ueZ2/e25QC1Ytii++2Nl/2iMXH/CpEEPEmINzJV9KAxLARTNp5jv+Z0gwXGSed

NgjfITaZbp+OQgjpFDlGeN2F7RqC/pb7ZEZRe/71Yu1Tdsf1n3K4gAEcMorKj9YwQjEWIX/pz67ST8wh

4Mnj9/tHH2Ht/zjzqHDdvIIzFM5VgWnpY5HxbRIEPz
o3XZMTX2t2Rylz/1XaSFfm899LtN2kx8SJ55JyZLX8vsIvLILcxgf6EE2OiXFndUwkIJndx+69LcJdOj

5C9aBIkKmcs/F+Pyl+alrfP7kPXFfdcjG8OmN0NiBRRiZW6yDHQ20fPl8IrbfRYPFrk1e9OiDXNyqHFT

uiPbq3KlfxWJOKNP5pmdgmHCudR/w3w7GA9fcsCQM8
qh02cCztCuycqqFbGVLPsVba9BJ6s37yQh31hs5VGBGzSPZIEvjrvfCbO5oUU2Xq8PTNki2jyVWc8N3r

npomOvyVWeuxb1WjDTFL6uLhI62xSX0GBVZYhVSPL3s5c8/vX0eGDqga+4z4RWqFtCZsj0YCrfwFApi4

F8N5OCdGVL97iztrAxGD+CTuX9N3Lvd86SRPE0zIeS
+wvrFSg+ooSUin839lTgW35xDbjvghglc6Yfecae5MUMpYrFu9Uh9WIPB30O64OX6oD6K5LbXu+LXAr6

DKutskOnVbzqr2TbPlUSOeEZL66eclwEviIRgggzlJw9OxI6NN64jvBttLW4hLSkf7qxMrDDFlFUCOFB

hbb/sa83M4Sp2Kixp5jUWVc8i2mMbNHQGNHt7JQVTm
Zi2HiLk/U12rVi8itWllg2LKb9opjXIft4MVjN2ufsomZvilfFJOurCtQVCxblBlemJq5bPsDgbh4EbW

1uyrhWZwO7FyeMtQfmGv7LJYNaHG0T26zj7IA0CftKN6u1x8xNU0z5XHsETLTsoVoduWtZTV75GQwnhB

XG1i/vW3OoNZNwFU9dFmXEHoZ2plNup5OARsQcf4WR
CWZB9MqHQBEeTw0lTy+b6BRrlLL4wjXxAQ4XLdEongTWaqgZIEn8Aw28gXe3kMLFgw2E+7bpOmKfQ3SR

+zbrZhmzeXUBmDgeVOyPKLMG3RLirABNO90W67HhWB9YRwe6RIKhMXZn2/xnRYoVXVPMsqOTAaf/von0

/tRG32iLKZ5NeFsSZSe1hTgvwLHGEY/qeTzRT9oSC6
lxZ2E4tqVRmYOGePl9p/fBN9UhMeM7A5b2ljZ2x1Zm061MieoTnxRrYOOv3xdWru6cjZj4uXpkSh2ngI

jZk2pTYHSukTavKJ7DckP14OlO1z+I4KgmuH7goqLxBa+v4AzS4lhsUlKQLBLAcLxyqEO0a8jwDP/tl8

2RJfTECfIfwgHV+U29Rece7PxULCoOOXJcfgMIzTvL
CJ4OvMALlTleKd+mmGGmHeV5Z1ncCN2lagCOZiV7sIxOrk94GZf88vO+b9LyneKD5e+Iy0+Q1xMK58Mt

/aqrrp9BDKalm2X8+UpRyaHqTpwBuXh+5rSv+COf0acyc0F+McVLwB+nrzx5+XiiqMoEu47p05YdzkGw

6D8Q1LKex9mhOquj+SP7IG7l+UWNlvX7vSxBKTD9s0
vnBYJN3/ltVa3BWiw4/R0seflP7A/HWHxQi5YfujcoxqfnCz6pZM4Xj7RVuORHGhrFLFUq2U/CHznWp6

WRfSFyDGvThOcWncJfjwlZaV6Yy2trS8t4r0RoppuyxyeIEbRlLTmQHLcbw3cpNpKD+wpe1Spbla2cGg

5a+mImcUny1La2Tm8CRMGy8J31l3GWIe1lkoUALHwB
CTZ5A8SWMmX6ixhHXMKyScuwQXLgNsEX+YHULe20lM+ed7+PPL0B4diEjqsV8Kj5ozu3yLsjU6mbzxDw

g/dYX6E8uPOkesQa13LMkCM9QK5DHyMyoy2uZJzVrCd4cEx9I9AFLIrxlKGFMv832gNRdYW8XQr4V37W

Upl/TeCyY+W6+IJqezssF3sZLKxgtySDThqqt8qJT6
Fh/+VnscarKE4KVuO1JTRVTjgEGI6WYfYG/Oatt4EGbElYtCadedT6RQ4Mw5P2oHUr8r4eU1pH29+IWq

7TnGyfbErh8uvvf/uPWkepEDPLXhPEshnbJy03lFnsfFxfIjPhJuhi4iOs9Su+eaS3K5N6k+sNotpyCV

ATISyI5blzrWpmHfLlizFh0/Fzu71Gm2E7sNEbXeHh
vGNGMRubWdj9KIpl4hM0nGgCbXF9bJNItdrOolWYoursce04ijHuAWyX/MgJ9H5RyyHSzyB8lo6fmkuF

omIw5g7s7X7wM+hateOuLoJpfad3sP0JdfM4kVuYeofTMbC5yd6qvUkK99N/IuxKX0r64kvEMW34jhQM

OKYPFiB+a9z3tKk/PLHfA3aQlUJ+pJf8jq0Snra1DS
sVM1y1EkPc1Qf/LvJAjn5Z6nw4WClSxCms/s/sO4LcWeVayxpDIjXxsAP41XR5nGzVN3QZsWw3lDICJB

k0HqdqqNvkKbfg/vTTSfRdVvC5MuxMnLcAEMCJPkkQ1uOFjLMbkb8AVhgPkMQ6Wp6S/hexlKKyXMOt+v

gkBQyXJxnBab9zI4lIluLPerNw0opIWtHr2JKW54Wh
v04cUfJyc6OB57IugiHPo/RinF65hC123WNUkM6lV/EtlfCNvWGzlQYkM8p7L2WmOvhh7BUjZbZwGYGs

1L7rRVd7KHxJObJ+FsLiFWhDwMBoCLGsC1IIrc/TjWPvv7uFYtqI5+h4OpMdOUaujkIw1vPJiBM17rnw

y09z9oXC6Dwn7oksiKpK1OCY9SlcMYo0ZysZTkv41C
df1T2aXhIsPKFe/DBrMIoDeE80Om/hi1FVeZz38IbT/sSUYGwf90smvddY07Vhxmruy+Ym8f2WkVYgwE

qRAy9/wYptYlTNBmQ8lqltW8dz/oZ7mmqc5BrSsf93In1dOBoK3v/5dOQaDD/Tm85RV8jQht2xHvM6UE

UdOB2FZyLTo8cz9rZZaIMTph/JXtBorEjjCKfq3dUe
bUOW/1jqsRn5X8qwmugCFYQFHCbaheZtTYX1rPKb9p7gKbd0shdVuUntdWXyxxN+aKlTvY3duikl9SL8

1zWVBgGPx4KN+/pG6ZD8seTUb2BWilSAlLZJf4LTBG5kjAknOmAk+6iIaOA7BkxsRp1s6twlseXQc/eA

ylqQoZJCr+dnGoGADHC9mdr/w6fkVQobacVSFns8cg
9n0SvecyZpfiUMuaqc+s/pm4JESDEzuG9MWaB/ns4kkzqgxb07Jx0Hekw7lPPbbdLmeeJEMxzfnZ86+t

YpglXnR29FekpZtdCaM3IKWKw89jTBmM+i7gn6PTit2Voxajb89xQ/WjpRXi83cOWioj6OCkUvJShdw8

dIsuThBN52kfG8qpyO1WH5cgJzww6ZrXJbOOc0QPX0
/Liliana+yXm9U4gZ2pobFxuHZqonrIvm8AEwAIMyR52Cbae6UagzrwmyQ//r7FgPHp1i15cUYkU8Tr4LjtB

DEGoJHnF/qOQEnmnFBUig5JRaAwZt71b4DSKc2N+aaqyw5d5amt0UA4NptyGst57tlFhdNOwrCAt748q

s8aU2SVyT27vDZxBFQWBt1Si11KVrlnJGhQjLBSCs2
P1dft+1Jd/yv4COZvz4o6yb84ZLG2xcqwtmlCN21uJ4xIzIAsc2zXyfpDA8yeJHhwPeSZMGngjryGTue

VYNCf4lW62m3elqoVe8MElp1/f1j2sufq0Y/FbdFhmyzB9kkv967aAL9QMfi1LVwx//RFBYEp5wKu+bk

gkIRTS8UFAneyx/OUcGKonovxtFX//j9iCfPMogB7b
Xa7st3Ky7yV1tQOXQ3yYgJ5FPBbcfk3Ms+B1zsT9/R7sEKxFsPg0/d6MYZuwa0c8kqIM9iysZBMXscAh

ff8LiikT1a2C6CSHk6gSEahPl9uPxQIedjb/3wULCUfzFIp+9DR+zPTF6DC8sYRpsTbobTbX3e8XnEUP

sgWuOFUuwWB5Zn9wExFEsTcKyualKtiqAiX7IRCyXX
so16AiajWJ14oamuBnVDRc3BOGXiWYFxCR+mTJRVJhCiO/9Oh8CDSV5a3GBdhhiJy5Ww0xrqGlTBMVJP

BKNdS9FayTcPlUe7CD3fqYr5EGSqkHzOXTDl/u3VE2TCDfYMIonfZR37CQ8t/1wTHE9JSGbbZX4F9171

E3/VROB/p4R4hjqNU4SoXzpmQ6G2TCvcxERV4AhOT1
EHFTuG5dYOnTi8G0Ob0sUP6hXiFqNo+m3E4QAZIGa4yt2+K88DG7qVNgBg52oZ+TtcxWjfl5si1fB3aq

JNvmvrRl+rRJRhG2YuBsmweTAV3S0a6SP+l+U+mfb4tCr+ON2+EYWkQvEYwlfVD0TQ+6D7f7RHGmyBEb

QBoTPix24YYk1+hlc+E1k+XLcaPxSdVu1lhinMS66Q
jekSCbXD7QG5sIqka7t4aeeVHdW0z+gXcrX4NOCIO3QnkH+Qw02DOFkxE3m7gd1ec0XctZxvLdFGea+D

bLwUKWtrqMtPWEix5MlmzyjyFSZRJW9Xi03kLx+vY7Wqff1Azy9bZ11OC0MJEmFVCKVXWHAHRRw8Qlrk

//Y6UoCYkQMTRXjpAatifmcIKflTLXDp2PhnwrLwmE
VNsLmwqdQwI/qkTOtTldbFOhukcjJkCKSUGeIu+QL/F7Ha2rPJ/OJCq9UrUSF7LWHkhePudX+uRS8QCJ

knrz2VeR7TVd0fbOzbAFy/qXMhY/NOv29wNK5Fkv5Ecs6c9i2rMhVC415msmS7XguhQzhMy9EnI6Q3TJ

jjB5qw5EV6mwv83wyguoWzNwCPFBs019vDI2e2h9Km
iCkJb79IO6skZLqdVyj4fxrtBIWbAt+KAMLi8x41Na1E/H9fX8y84oIwyP50wUzl4+N1g5St34xM1pf6

v0weoPcxGmjKoYPrbkpZ11xNb6R3onyMNwfDe2Rh6I/GA9Pq+vfE4Uu2SNdxuCgNCma9onRYd9sDnSnq

JdzZSs8srNZvWAR3UkEYG5pR2apqrCTpviPYTB6/1e
lcLm12t/Plzyf+pP2uAIVfx4ntYMjtM6Mx9GnbUTYhDovg+EYiB/b3NfQjFx1u45SCPv74uvGo7teQRJ

13yDDYDGtDKIFq7dxXA5ZP0wyqkTgCgujxi2dDL3l3ut/H+6J6hJk543Cy2yOj5+07J7RviqOwD9vUqI

DEJ2s7lTLrX06DZUoxtlAIMPf6Md0CP3LlxS9rg+a9
1P3FzepEpKIJy611lYFg/HnESvdL1crHVQXe+v7eKZnSLnTLbeO/TX8YhPOFN8ZTR/eJAI+AoJ2G9CL+

VBVAL/L7dNoRvv+O7B0q1Pgz2XGWe9YS+KTUtTQ4FRT5ahwyy3VUevD8twjloKbNmi7tKwr0ztZfsxMS

d9KdQs9vNH1zkK3Ag8R058kkETYLa9rHcjw+dM/MrP
KXqbpKuY7dJqTniFt2zaC1lBMpshRX3qxH1W3Cxc2plbGhVzJ13WsS8iO+j5NcAG2LfBhnLSAwgS8/Rz

JZXFEKjFzA5WE6Bq58S9aifXBR7eQoZkeEI7Vrg3zmy7UCE2xo/Gnk7hsK57N7kKmWPIEa0mZefiVR6N

IhOr4j87Qr8dVg8frfPhn2ea/JnXfivp9+OTB9h6Mt
rpjqCZST/zT7Ehuy0D6FsefCaSsJ+WMa5ML1g5s82oX55yq75J8lwUYrj4UtRuq1WntinXIUT6cjG16t

Wh0wfVfcTaeUGLD5tHU6vqiO/TAkfUc5HNvfuw+8pkkaVxme+FyutQ2ZPl2x3XS9iLcawUgFrlWUXhfq

aX1AzzbucjYJY9AEXrLcWoyg61p+RNCGOLQOHCEB7H
I8b1tNg2iprPoyedLlPvxMxOcwFfErcikgp0PZ9kcTwBpQ/zCkbcRUyx77lNki4bgWeuIooOVTpuTP8e

Y+FOEDW0agEwcDaZzWGoyjUaHVZQNmwxBMpsb+cZ5gu2RUzWiOtoFU3QZ3b3X8t1lJMikeCERhaf3AJT

Ivhy2Yo78yLPYUOD6ejlgHilL/PCLnufjnP9uIPy5d
YuTRo3EUYit1OGFfaXxOLTT48zMXtF+dFL+T3o1MOxDE5EAWa6T2HDiC9vXDyoA5uSqR7IApqvLASVUD

gZjWstd5oOj1PXWAYO0HzeA4MhWAPS/qM1/9CPbGgI2ElvHtqgJd8H/6BeKisQTAVic3P7DD8pe6xFP4

zAK6H7a9neXDgRzctTjx9wWF2UI813YhyySvuS2IRQ
JdFJd9JCyKktS0a6pgeZqY2Wfez8/d4MJtMxwE+nzPw5FDD5K8zYAJIa5SVKJS/3aVz4tYpHHkFr9uKu

SE79i8M5EE33sa3qD7Fw1b27kR9MG9FB+ZJdZ9OZ3FuqLX3kqzAg24a0mnf7EJZyGvjni2kbHs3gmepT

PTNFRMoMwaMv5e1iDsDw3m4sg8j3FsJMcmQUmMlX4M
Gt8SH5Ha8GIgx69XSdC8MgDrMdQwrAdgE9cXom9hZHM+ZW4hj3bLXCpGaJXE0eJ+IZtyarvkbP4U+fcv

c7uX6Yx60cJ2l7Ts57912j7fVuqe1OvHpGw3ZKnHTXl4FZUUqJJv+gXUAI5L9l7u8lM0k7+Ia8Q2VfGA

xzJLlY6+hsRzjotHtaSt5z4h7rfYB/YNc99m3cXKaM
fu5LQQjuJL9DgJUNUWXyoJ9oG4Tlvg335xoQJwD0dkaFxOFzijfTbia3BE14wT1YHxAMD7KZwR8sBLmW

o2Dut901crUpihULiC2IHrnL/vU2kwnQ7+HU6DiJ2A6QSVqCML2dpmZMD9ktPUWlb+ev/8c9vZbGnilM

N2qzpf50HO78tg32g9hhBi1DE6Djrqy6XfOxUlO3fm
o3ExqqXJIW+0F81Y8DCFOn2El8E8hMUBP4s/IRrZmBq5IC6nyPm91rFANcPhuuP69kHtbb5ZLg7RWq0/

ZLz9kRP4/jnhS/Yo4eUf3mTRpNJffjV55jxuyN1k9vdF3Na7MQ8lZohmxmccdB9Lo1Z22loiDzRpbVFc

OGOrEuY2zz00K/XxwNw3YZX9nS9K4LuHBX3C0HV/9S
Q4nSY4N/vLG0Khhsi7viblXHJpF74HqZxDPYl02xlff+ruTXhEX9J8CnnkDTZdpbYx6iYx3ZOAF1hw8S

mP8hIkJpVgHDVD48S7Wkeo0LQcpzQkxG39Z77M7DC1AKAk1GEY715zu7OTvdP4aTOu6zc2vH+6C1UB5z

OG+TSyLtY/UMIVOUs/7efDgYm90WNkMLsf5zvtE7vc
TR3GkpcHPeGAZ98ycpYLssknAcmCYOHmEjdwWzaSALO6KL7Ey4iqhQ7iFIFP6f+7MYeld0TnQ6E+4kiw

Rxqu6BvoVTLQ/Btyrq2wr1w7cSso3R6VNv4sJswHPHkkSgcPO/owQmRXI4RNqJNxhu4divqKiuxNRLXE

Yq6zYcaf82Ky0UrBmvEY0JRdu5Ynkdgfccl4T5sPzW
udv9jeCtac4fMuCI2j3Yy7vfrXb6zHAz2rYovxRmmva/weIVGZB/rgK3MduZO1Huk5O03eG5sYJ1e4+4

X8mMjHYQv0x/EbXG9hSh3++xJpIk+wwN3lF/qXUkmiWih7tJ+2sTHjXkbYCxowDMpzpk4mGJw41qhRbL

wNImD0yVMXMdFJxl44sj74x7+z4orO0Ee+XCXtBXOq
WFIbNqrADo25bw02ZW4E3Vt6hY+KG5pSZ0isPrrJTFpxRsi6IR71DjwURlURaul2yeaUDCpyz5NqDZld

HgCJud7aXSY1JH+WlEIisFyrAtIYt8oig0hTYxYAYAJ7TVhLnh3hMN04PcUAyOHdl5qwCQ64YbemesOP

sQyhueLwfRkg3wrHIB6wlACwWygaIIA+05DdbFM9p2
zOayOTe5Aghi9zL4UHX+XvYRvOi8Nw/ffBkc6H7wDv+UY6n4HvsZxn3/gOstK3+7BZIzchOAlPMTlZiU

JLoeMJbfgtjJnsFQ0LDImgkIDbJiPDe0qiPGasBnm8XDOMhHMx88wJrZdy/FVfNiLwzyn0Oq+vG3tiiJ

nQscWC3HKLsiQ8QhHbnKcfXXN3JEL2H8KPyEygCoDI
VHPZDEh9CvYNsmFk0p7+sC8AmDx2gXHiHe8zr6FFFCHYcqRPAyLIfnh+H2LoHwZljiKaaIW7QgAMtInZ

VIE4+0bUegj3NjfIi9e+Hb9DNH2LjS0stbq461TnMVNICOH4lpzFANlssFLL63C1sxZBfGa98mtB9kqd

8TEekUo2NCfAq/tLdLUBJGxRAT7HeehFpEyKMf5Fjw
6Bzxr5/TZk9TXwOpTHr5B8wL7z87fa9LIpf0Xb0tHHACPMTO7/BaSnvfFQF2INqx5FKqJgKmvLTELx8V

4oaZ3uUVUOZF2ZmGlHA0SWYNH88LJfQbLAxNNP3f3UMNrgL2wH0DHZ4PWiYpcGk9JvdPJNqUYE7vAQy0

SnHrpAwqOBkgkVb2JVM0ORs3zz+bjyh/g53pOTWDet
8zvGR6QZME4KQBzLSOaDMcICLLHjCebBoRzUKi85HOw3uoP+XqRp/Wu33uFYAK8I/7wX8kxtPQy3iyjV

PFhUDJt35HOIzdh5yy++GyuWQSQOw0sOwhpDwDy6ckBNFoy9IFzdojgLQVvBv7w4qeYy4t9p/QTnxYfi

gMU2BdvG9/IOl1L54jqbefJS7ZIB0Nu4tCZ+sqk+[place]
tVnlCxt0hntSAO9xBr0kqG+BCb9xbv+e+QhqNqrGojE4dVlXUuC3zPwrrW6ICmM4wLk+bHaVsI5E+eJZ

WLJT7tBApfB0dqJ1diLQCa1AxwPW7J/Wr780UuvRtjTxNHzA9jfAaE4zBvizido2DnzB+THCVNQhCReJ

R88cPcOt1mRUgPxNzUH++KTmDsS0s8Z7h/IeMHAgkG
Gxsj+jxuX0uDypZUem8MrMHeQkMK5s1FwTkm8VHw8gpOakQlrl8XfYWEoQNmUsPQPU3YlaVri5M8HHc+

5/A2VAkE03Ppmdcj/1DrFmgx+IOzE2UHjwfO5oZp5fsIAFmIXHo5apm+ti84rh0fe2h5o2tUqtSFd7nb

zeZ6ELwsEylILv34V+mEkHMZnDy4RKzKEgz8JWzt2H
UInOj0QI6lgJg0QR42pYrv7p1NGlw1/HEkg1TUEMtlRhy9mjjKS6mqOCMtjowoYTYEg6nS5+DVws+c5r

mvzj0rf6wruhBR6RmyzSlr1qNsH4uOwkTXWU2e3sgsWpvNv3SEa0PgGsKBPjbwqq6lTIfSBvIVO3MEFr

8ZBefpDpFClLF5wT2RgV0k372IrmZqSY27qXD5Otk/
TlcImY9RdQMbA+vtajQ+OYBWa926zzJRpm8H7VApGzNgUVA9zGvI/19rAVAtDFRABvdnyuq7W+nOoS+M

Rol4tT9arq1PC2z6P8MzRHxYjDHTls6K+ufs5z4s68uZPvqvB771wGmbO/f5pAkmvk9TkWG1evH5R4Et

ZANx++1T+q380HyklORyjfqLJKvx7/Ff2ubxu77Lal
ihfD/sUFQ4pv0tEcxWif2qELC5k/YiJLXNMvr9azxNBNuCTrKft5EAe2R2l0q1f4tOKO4sNbIJe/dyHM

I10shhUu9P/nbnCMHB53O6DVFreDluOkS7ywqgYxJC+pmFePz5VYNFuPHgtipu4rfwSw8rl5e4L1NpBh

On1h9I6osq7eqnjUqLGJAqF4QIynd9tf7WBuwFLt/e
FVmoJX/wrrT4Ta440HhX0ryk5f1VHdHNh5q4UnIgOtaIEZWLSL0fFo1HGsWj+ePzzY8elRMhXYVCwgvc

3bkX6yUjDW/fu+SGNONlFd5xGAyit8nYoqPxNgrefnh8jStmlsyt1byFg432CQh3LEXh/vynCpe/lbTg

32Ui8FPSDLcjy2uzDPPuHqW1vWLmKC9BpVRdPLaJYA
dCxf9QV/tZewq43A3RoAxQHaBZSfoz4dv/0UtX21XswHRwrpIe5+TVgdGLR/lNsdiI1E3pw7OeaJZtEh

+MMAG1em/GguwFPuzlQS5KOlHxblksJ+pgHKVWyx3nJgmAZlihPmM5c7Sfrk3GtB2aJpcmkyeEKPfY37

FucmfJh3YR6y0Ak0Lvlc40U5RetuVHvu2E2E+EoLqM
Ys9KcVtFfJU03GJMVzvVEJJ5gW93ojlMRJswNsG8G+9nIAUSBntV04tLJ23UXKtZZacLo6A4wuayfih6

pIMRrzSvkAFDVaEG9I/rvZfCBUdmw94YJ8cC+W+TyKng99onqdWFCulkncTYlAy1BdD3QwPcRbz6pbDA

T3MTdFnx8sqO36AV6JmbEQpHbj2Yk6sDLTxo6Dn6Kl
ktYieuB57k/1WH2stuV6i7IwoFDUQ/yldhM+d1EDup99AikEDPm9SUJ2JWvZ0/abLUJBJbbsj0nerdei

LUTj4b25MLQD5HeTYxCa5jE91Gyhsc+bDjUeXSLlzCL+yRU1k004+HmX/4bMf8T6qbsTugsscJRSM8mn

Z/jQ8zenVktus0RHFsq7kqfsqCOH9eH+L37ibmaRCA
axVCiJJ5B+s2Safd0MwTaNIswTOgYY4SMsklpCJExlxLBKzVk7w325hC5R8bOQNutA7b2zgacwvIE9ZF

8mW5lVxkdWwqlzOT0nyk2U/G6G8p9GG5PntK1FJaLzp74QHIJCTzEPLYtLr+kiwdccLVh0NAKvZlej2J

9iSAumIl3NN9YsyuxXWvJdYk0Q06xh9dTvCSslrBJB
HPHnikDn8mTWfyAbz+BPDDpD9NIJ0fp/+bb7HcM1nJMAm3F4W+a9qxs1g+eaMFGM6hU028LEHfJiGhXS

G4VkpnP8dGiB43I414SYJZBJg8c2ErFPGqPvZEfhk5nU3M8DPCmMfgICLMgB+Fxp/yDdjcPVhop3aKJi

nqQ9n/I8zvamimYq2k59EBPkDEhRmzrmCKxBOeyf9c
Cso6D29S87UlXYagcTo6SOwu1txxPl/qB68pDQBpbd3D2W8A9sDmgkhkhmIL5nxS7uvmPMHwejMcZ/yf

YRL/Z1cZNmKezPacbje7Tl3X2vffecJPOQiISDC7giGc3aCc9fSmILNJq5dqSAymT5R/c46iDg5YFFot

sDRCA6rPg14uwrXsnkCI2Ex28bMd1XYMasPPTmI3pn
03JPQ8+duf2VQ/h7KEB5oeRM8m7DhUG0VPRnSNim+o35FKn3YLU+EgZN9Ni6ft9plKqrUjNpYS3SB+/C

J4I1RMhsM8mzlVpuD0y+bAjIYhL4jGtPmLm+L/cRCgBkPp95SxYTRLCwZGNHsGLKQQKdyACjjZHo+ABO

fiO+B8I5mpm35tPaqGXF/J41jv1maNJkKgX+evnUpk
0o8/LwRVS2bf86rL+zpE37zGEBRgK8AdN99Yy911jFsK+SiLbDzrlO4DiGj15K2+MwC36mAFnshjyGgN

Zu7JJ/1ZrIKTcxLvESyOh5C06yS9ZWV4XNXnXEE/HnntzgqLFK1/jPGtaLpsCFqtYDOjAS2he6HqZEuI

oVI8pvdhjCGy4+3SNC6YeUDSw8NzSuSACabbvqOch7
4rY+XDTKur87oedLBzwksY9MCRFh/0A7f8qK2+Gq8/+Kq90hgb9bHstE/sXknL7o/74PHAl3uAlTSt+h

0ckHLFY0X8PkgKJER8RRjgbwwD5mNR9z7tP52FrxpwMFmPTful0dsnu2KRwN5GD4dhEXWBOB7DBV0VR1

q3bLlyf34euqg2TEG7e7MUQ4GHBBKKUjc/62YWs7Z0
fruMiNIstOVYvWxm20yBzvnwbwSjP9FEjQeX49hMRnIrgcQn78bcTwVG30ua6cvd0pMkbCNVHwmwJ3OQ

T8IpB+V/Df7p8FrnCSY48XOamiHQaslwIAHMnk9uQvox1d1wyx2P8ca30iJFTm/lPoUOkPH0K7BBbz4m

E5ZrT2glqYx3mNdyhmO44we+pP1rxsedEAtZGP56i4
+VdyyYp9pSDoaZL+/NYQQTgASIIENAYTWYMhmzfT8g0qAclFPPacMZrlSL/Dhv8qvv3ThsqrKvh+GaZr

O0BlL9vAHPrkxYW8OMYFe5nY7eEJcsFFVD7+czZHGo+GgaaAI6bmM+9Ei8UhOUn3e/hpQiPzRyF/vyI/

9Vd9T+VVYfspP4JO8QBudvYHq78kilVIBXjkpP1Ia1
n+V319tzonlIIb60Vyh/yZ4IngNvPAjA0oF+cc04P1Txh4DO9kJW6kS529IWrToWU2hQ3NShZThWAjzW

IDnDHiWpxOeVNhLo2Hn4w0qe6/5+WaywOsh76r4BoK7fXjv5X9t/9sJnftmspkG2yOJyQyeFrFrglSU7

WEYGxAG/pDiaB6paXYtLFAP78T6jXOBxyznRWfanHN
z9htJM1NlY0en7jCgrUzJYdg/WWkW4lZUIiYAfQ+/Oni0TBlbJb1gHGV8sR2IYSda7nb09k8tMjDnXyS

797EztAbR6pVQHHcistsUl+8bqFSuX2I14mKZUhDXhqooTanwyeJfJhxF827NYKM3w5JMSprcHzEuQ/+

Tkb8Asa8lFQL6kxDCKWp//0xhqDv2DlyRdyA0bsTji
MDaHGM0M1TibhhtotD5gqPwuPgMr9rZk3LZswBGzXmGo/9T896/741YRqDkWrSZwYwLk3U3tOfvuWw9S

h9mFzmiqdzrpzcOY8t7IYLAu1q08p7iEhLQI2dbTCsRnnn6632SB9/4LvkZN87naF1ywwMqPNrB0qRlz

pSWUoXDcuNk68+oHut/RIOrwrWnEzBNLTtn9DS38m/
BVZpYuwBc5cjCKu1jxZgg4UrL05GSUuuh3USL4xb6DtiyKJWci5ZM4lGcQsjx6UgQKD9I8JetKeKHQmx

OMCwMHn7r33PFKisuTVtb8n5mBAJGLVLYeuA3cz0AOhmOPhP2lQX3a2k5wVjfgfT3FUZ1tWN539vRUzC

040q+lxwIpy/nWgxXfvAUYnbO3tcw1bIzLiGQ76Lbl
1vS+zDy+CYm+rfcDK9wFc3Acr5jaV4ATeUNvLvrX35CJRGB2/8N/WLdybLVfH5HKiFrstGeEJDXe+5qs

2rVaoP31sUlg9CCEz0X07Ac+puGdqgY6ngxxVcAWqDP18psiSaUsPvZAONVzps12USVi3lozAfgRl8oQ

zu8inVbsBESCrt4TlRYdd522MqwNh66k+lb1v9nfov
gCXnBgFgni8OjDciVdewxlRszi5Lxbh5E7KYBLapLejkgZ3qSnXoeNK9wHGGPMrTdEOT2vri0BGCb23Q

u54ME6SijwAdHwZ8Bhlmhcb9smh2GeGh4xZeZlanxpm0c9MGiZkirLNj5QpJH8wjbtX1B1wNLHmtd1Tc

2Q13bdb5WicV3tFaiMAoSnvaj9sQom+bYCPAhViZ87
qcezLSUx/FceOMHDgr1lmT3MHoXyp//iSxO0Ykr1TvwL4PvZSnHu5gR4pnvuYE4YthZa0+pzZ3fsVO8L

QF57QhH35jpfUTwNedr8h1Z5NmDyrRpD1Xit5SF56doDmcfLHuki4IpsI5CHpgK5AtAIRZIk5bQSr6SG

Dmul6if8Og4Mh/cGNZeFpxsVjxOgYR1nO1S4sGHTX4
f6IDksEu64n1/xRcpJsutSmo5k1p9oIJwCjmlwnlxRA+hbG7nrVcs4cDKD5RCQrwROL1JF6miQCEZuW7

rrLsqrgc3wUnvUfoUHBHmEF7XQh1cQjEXBoDebmOmDxV06v3p8ZwJtk/g3je9dU2IJHkBNoXO86wq3R+

A3FkivQP78HYm8uOJAVQZ+wE1NYczbVJo6qo+0Zfb5
viJ442td/snm3s9o51xnQimv9+C2NnNJnsgAxbyJWd52TdAGj7V/oNqHHcdZdCZ7qS6360icyP3J7uxo

i+drxb61tQ5TJbTbuG5xEQ4+eJMFAMSu8Wvu6zAwnR4fbXOTiYxhGfDk4CsdVa6QZAGGJpWH7hTLXQT/

IK5EBo1IkozwlkXpUs5tdPZHNWWyeRUjRKjH7nl4WL
cMEgdzg21K31oFrpbL/MattvorRqOwumerRINWWaoPf5ZYa3ln6snjNViYEWlcTqHqjzZlzZpGgwZow0

GluNaKnoiyfshLlbkjoesYV+PSsGb+PF0xuTu0lQpmrZXfeEzI4dDWmxy5N4Uyr4+xk8+HlRAG1zdsIx

xHJcy52CnjfOG+SQ/TYWyWni9E3Yq+zBgfRkqHd2Ws
9aunc8OPq0243Q23ARpsmeT3wIIQyil6G9OcCQhIpNnPBELntA55RaMSpPlQLZfEUeAeRtXFONui/O2v

IcFqpRCpV8whZE+LDjD3J1LJBfTt0meVuS+bbsUkziRbB0RrvT6JxbqSbIrxP4gafrQyuYmzlFCLeacN

kofrrzQ11jpQ0hzrofASzrK1ilNsvyVfFvVVmd3JzP
CaKkRCqDOn7WPZL8OrhFS8slsCNtbJ7CZh/pv+PDMNvuM7jPRFapgelFCORWOZLxGBirgPluswl7eMAi

DCWDwQ+KO/ye5mdUGFD4JqoPa5aRMfK/JfJPoy7WA4zN66bL1MUXKrc+TTW2eoYz+F2Ark6ZHOyvC4+H

SVcG+Q4JG4d3+4s2JRs3h2Tpy4QYnQMPyis3UC2Ec0
1PcyU372HAaFqIb6rh4Slt+x2kfqFiwHup07GwUh2tIGJHmE7PyrXqrbx64IrVVtk0YClteppF+gHgnt

Zh9zjNWlWwl+xrh7aJ99hOffXf85Pi4pxJNWu5dygm8UYLZhztC78Vts93+zqg5BX4vHqrVC3BqqeMiW

bhIL9HEvwunZdc5cz9NzlUDJBXxRViFhd1PgvWftHE
/MHSXKaPASlxqP0M+U0eT3zXlk3+FYfSpav4H0cC8d+rqlQcHotvmZnl9yg7e5po1nhSDy7XlVMOpnXw

cMwVtw7+sG3znnH6vDEsvibD6zpLuscaTdX+C8ILD5DxXz+9NaC73WAL1L7r/eiQCbVgaMaRfDa+OCOv

q9rK6VgF50lb/zRu2Y+R7dT0UplBkms8CjRsCmSxfM
uIkF7y3Iw6vsLxytYinP/+/AA+2Or0/xoWKQU5f0Ti+pG5DiT/tE13tQb2zUGmUhUFT9Pyb5e34hsbVd

zWhzA6OR1yV7GNde96miVklb/V2anbuJyfTDI8TQgoownjv6Z5ksYn4NpGue39KsN2ILtmDIYm5V0arD

WQbOjxbc28ra1hvStJv78Oh3FZZAQ7nLkCQudQqKrO
mfEoeNURbZqObsx97kgNeRfM8w8cD/gvBg6M+7IWkgVuUIhkrhZWgfKyyzl8im7ANDCRn+Y8xVQXYfMK

zLcGjswht4sPwWviQbtAql83CF9U8YbbXWqv8Q9etSnpeoiUVvBHZTczDdztkQN+eIbQAeRDOAhbrxjU

jkVQcXla9buBRWsR09i+xhcSTJHyf1DDmU2+TLgNz0
0pwtC10vjj+HEjKWsSq48a9jdf6SxK6rjxuPjDUcaA7gxow0xHIVo29CVTPmVYKkaw74NT55ahhcDs62

1KWbuTR5PkibOV7V2vfgSm0K05BdCmcs58vKdm+LJFCF2U/+LOWELL+YChoCzdNubKm6m+fa6t4ZDsOL90Z

mPtLTLpr5cA4HYrlfdzq/dqPjtPvC9d05SOezw7kvh
NdAwry8QxMjkAP0OPlJ7c7CvuMi1WSgYaHeG9R2F3BALrvByvoHT4eYtQ3YWS+h7yLDdiLUBXpe1iw41

92RXpcrhdRyGCzuuTX4CvppmBH5+tyVBW1ceDsvfHYTfNvR6yT9A1EnKeTP3y5lyxfFRXe5kfVUyj+lM

PDC7PluYxghb33zICQ/pwhiYUxm3nLdmtmnI6+42WL
FAtD8DkpeEIJPYFDbshjUh84gZKpc7E/sENJ4jqauTtlWIyrZqFrzuSQ7nAdW7wvqBgUjWbgZA06A2sM

jiopeepqp0LlDyq28VpVM86HMBYdcV0+adq/zjCaINZExu8Uk1MM+K9Bx04fBjldbVJggcMPanc/Cf0/

b/jzPVTLt3yDhT66Bcucd/bVHMTNNnnEqL+SQ5ffx4
T/RtncWVgk7WsxCIrBFNsUmA5UPk39B3AWq7Hg+C6QlbHyHJ/WSKtbReHH5YvTY4xkQDdS0xQH11BMI+

YTxvEnTpjuohf4330BsTPp32UbHQSIf/mj/7NhPUTtWVOPn0uuXelCfNEEgV5PTmMXKYjVIdXGfiM+cP

lvyAJNcdMUE06QwCbgeSX9V1nzDdemkz1jPkvRGn6y
qG1PEAAq4L4f1UaD/uPmCT3ObHfzUqDjaS/At4eafnaMPY8UsJNUJm5rL7o7/wBU/zfKeY1pWks/BP6F

McwnVOw1Y3SrETtgWn6W0/CGRrP5VzDHUvPpd2qAMHU+60NeYuvA/2lD4XM5zJpVyczucWVwCB6ctizV

brN51BVsdaNfi0ZC19zATHT8mLhYhczlZ5yrQRx6n0
0tcOBiADFkuIAiPi6cN66vgfbsMBB28OMguK+y+rkIqeYgWMH37gT67Y819smmgxbauUGYmdtfaDKKmo

bH8wRsybQwAjsCGJAQHNi8qBaisiHkUhr2ZS5oJcV5VPwKErrY35rnGRATwNvJa8LIGwU9bSE8dTfHnx

qeIRJN3zX9/NDU0qI/4qPv8nrFjTu6Go3sdWZ8Ov5a
Tt+GZ1VZHe6eyMZyXSuHP2xosoB0dm0Rjsi6J9t9IxODK+elHZ5BaW1MDeC1zrwC8ph4c+YKWyQsnyYW

dHyFTDc00UBySquo/u9s+xTCW1OdyRkYJf2ofjt36VEbw3NMitpJV9blocHGyNeWeGF9rlPxncqiZHUA

DFdlRJXKegdOMsYssc4yJ9/6cLuTVzhaCYYtWA/j7p
mTJvg5pFai5tleY9aH0pVQbyZnuf5GNXbAGh9aJT/tklQW9NugaXkxUtdkUQzVhDpcixLaK5Q3GNs2cl

tjJ1eDLwSZ9R1gkx/J8l1PHiDdbkUkjX3kQnHSYXkMa/G2FVdJK1yOmRRuFkJ8270eNZ/aOlN/kRXJLk

wdl7DYe2Ok9LqLnk0/Oh2EE+okl7MdS00iqW7eouuS
0h4P9jqUjcoyViKRYHUG8Nfoiryk/tv7eyNK8fuWy4buxBjBy+LuDglQihUrULxIS/HiUIJDKVAsWIEW

a8cfDHxvTyjlLhibUToT9YW/Hu49Z+Y+3Zkzc+c+rNc93+y11k/2xX857XzhjaqbaOWMh2gyZZeV+vaa

AEO4R4Z7OEp9oo1G6OxSQ/JzbT9iapW/DRQIE7iQyE
cvjMxIrfPQjtlINRnV6hRpjP6JE0eJcABDNa7fDkpfNPvwrT2Q7rGliY9iN+7MeUkK68g/3caMmggwSo

izld+zVDNX2ukrFYAN6JBjyy+iB2dwBkEo/au+ta3OMb/FdLm8rAFFlAX3R3gqJ+EKdJfUXKJRJ+9ndL

YYqSAeXc1IFazU7GiHjW+W84guXwP9u6L6E+HrtifO
1ZW923a/A+zpSnDqv/lxHk1yn658pHj9RaasCe/N/kilo+tXIWFczxZL1KFs93TGprnwFb60xrN+ar5mIx

d0MDSSO1EfvfZFMCi2nnbLfjBxfboEEA1QOaVcUxhvpSm73Nt8wIcs5yqXkz08RZuKYd4reuM4Ze//u4

2j2Ry6kFWdFAu7qgytWDYGa8bllGDJagJDtLwE1s6i
ITq0uNHXbTz01hl9v+25NvfSM6kgCcH4xke2qibLib13N4kasWmEJPHXlgkMo+APXI/uurp8FWyUz6tv

zBHxVbWdtF2+Z8khZ+4P18arq5mgtQYEjhQOdC7jOlKr3zy0utUO1gEhr/xcz+Hy+TzyHToehDxMTNof

TZy+gTKqUXvLNFJsWkLRBabuqfcOWONTU4g7etNj+v
PilzMARQQvge3Sktv/0W4qDo5Ke/+RgCk8znZEA9FJBuJt63ZKRvK1GJ8d5hFDhKt7wwrh28rM7SFbWL

NJ7g446OEKW0MCz9AblYmzjqs1y80emTkGyEM7NxTutpeU02b/B6TauwQR68pOeODDF+LUkPuZq2AAr0

4OWo82yBYx9hEHvUJ5bgPxzW+qo0B5+8pe9DSRcj2H
GD70YX3al/uHScviPLeq+rDb7d1tS1domX8xhr7ZEAtFW5kSWxlFY6ax3mDE1SqfrVhwk8prpecyWCIF

LhfNTgNPYWek03wvRyllVnQRr39YjJC91S1FWKdnQ5mI7NSbwn/SuTUwyeNN4zEk7iLrBYHUBaVivG/A

IhDx4VApPzlK17fnuwFwQdPTOFCOo2juVOmo/F/6tw
26k/3n6OQ9MoIh66XrKVjNA+fSTNEaxaWhUO7Q8PRmJjb/90qu8G0i1YwYf/vOHDZA0yjREiQz2VhajW

7O/8rh8DYUOT5MM9v0eW7ym7RwMZHA9bXtpfH2Y6so8lWPzU6B9g16VeDqP7JsUM40r0+g6XMWFRLBzK

hoWktU3S4LuPOFE9RaqwixhL6up50mCDcm2876CJvT
0FpJUFBU57kQ4n9ssYsKVnqvwIituZnadnuUusoxxhYnL6Ok3m6WQLlJ9CRURBJaxbbD3vI1sTOTQ5gq

od7CtHOx8KvbQ5YNsGugTnG5vhMobP3qgluvY8BRxIqEVOUqr4K00DBixtTK5w7vgcmQutLh0xLbfmG1

nKZVvCbg2KgbPXeoqM9X964eH1v3Hg0ntGNAcPI8al
EuU1clmb06MbWIJGIPRSICjGAxUyYeFJJpQaOY22b+ijA/IGO27t2fetRZ5z6GB61xoXDCTxExJVRGBi

QyWXX0rrwhO7wdTo0UcfvEfdBiOa9bLb0pcR6WxYU5hVJ2eBzvhTCZ90Lw360Tb6h+p6GFXjB01Qw2MV

VeIRwHGR0oi0ti58liM28nyhk7NX+73prG+gr3zrCz
t7dIDjkK33ZpPA+Goi/+qNQl5cbwq8CQu9BYZGouxu41X5+lDm2fzQzfqyA4YfRKIutZQlBouv1qXfVj

M+zwesB2ISTqNpkB7u1ZE8g7JuwW45Cqno8D0E8O8AWq6YoLZ+m1T+f2aAgtGOy5Z8zdGDaLU98dFdXW

yhOr6W/61mSmkNd66dTlXozTZ0G8QfcSBW02AqoD6z
XL4/cn7AWYfc2z/eYbMikqR7f2IwTVpZUeWHaOmi1ze66xXR/LyWMArEMC+IeZNGoxyySbWFec1UfW6q

zoVtVF+qpEAfEcRO0YT7CzryMZiAPBKPn5J5iRb15qQoemvqJI/tfLCOMOO1AZNxXl1GrPi9glZDIA40

DSYdxlVMCCTQ3ky95EUQOw7/yJEcJHL/75E14X8jgq
3t898WZ+YO1N1zFF4S6SsOc/hoD0X9Mjnorg9MBNOWFKSQOfgJwBaCogqQkMv/4hv15QGTOgeAF4q/Ih

r59IAalfzEpgywb69g//lpKMedXoLeYPWwd0WpeakrLkapIe91/g9ArLfpSglbJjXWHVMp/EqRbn2/xl

m63csH3us35t7er0h5JwPUBBDwD7RIsroXWlswRr70
OIFtOrDfj/eGnXIHk0eXwkxMyViMFs0HAcVpNDRMPguQJro1CC+6V06yUs83aNwa0JWOt/n0Ek/Mhqyd

3TbDOwKyGuDTP+1F8fsGzhwu0CHsi0tyzXo4sYfxWbZKC1jU6iYW7TPenN1Ai1zhXKD8Kg/SEW0WL6c8

kgRh73IJkh0I2deF5TEkrSrD7zwnmsGpfeKxcIabJh
qq1bAxVwXWV+hTElEwAfYfgKIT+13CXZEItkg964pDuiSbWERNbh+NyV9RDMX3kKHIzkSaJr7I2/tj8Q

hIuIdalkTNmvMUo6EofB4uMjg5Wm82OW+A+ltAbPKIDbuKEiGmvzwnQsvVqii4c5BQBwGO3lE5VY3uH7

Z9eSxz3IfGSoHrq0ECNli/72GY9WhzSG5/r+FnHLgM
URcT/x8M8yBcFb5Fy9G83+VHPYS2NhtgQhBUjVv+7yQkR06fTcAT5gt0IhwRMI0qY1C42pgWrs3Lqyv8

3GwahnxLv41flwwMRy6SFkCcKzAkfKMo4pZ960FyaOl6NjhBTSFmoKVtLmcVAoTqxRSmbyl8YpsROZPZ

Ww2oHdBiHZ9XQRMnGG+WQ6N/lQrIXdjGSNObbs37qf
1yH29/uqSloSqk9dpo/1bRbV9kkOePWJ+XCmjj6NovF3j/Fa4y3gZJ4paObP5rfW5jlE9ONPhmnrfr93

PtMhdZxwrNjzjJopyNJ4ob63wiyW7/JkJgYROKjntziPmp6UNWSDnwVRg6hpjRPbS2qxeSisqtlCpTwf

qmfKkzrtX0NlvBlT+58V6GWs2IJioipmqZRJMqU0zg
+/2KjHmyhx8TU8VmJm2N868yG5XhsCy9WiurXKczAJXZrhEjkjADRAQPT0svfngy/RbOYkD8b0AtY78O

jqW9o95iUZBMirRLXZ1Mrm50Wq5LpXDA8ZzjhL6GTPNEcX/EAZ8zkSUMxIjfiyoTbuI1abNZYE56aEgJ

4acMC1br8lM3gDEq1SRF+2Q6T6LfQMYWt4JJ5kwCvb
d3jlgV2O/yiS7aVaTHb32T+fc0MxgPuZ+MHrLPli3j9giV/r6dRFeCtfYkr8g//7Szg/idxHtERBl+AU

3qsZSIptkOhcC4HqN91y4vHeMYVIXOv2wvDksxLKS6ociVRJTNJdG+UPyfhXGuzhKK3qy4H29SQjS5gs

pDNeYkA3DfyY+D2tU1kdNoJgNETj2+vmY9oulVVXV3
OLLVZIxuPPkoD9/7D5D2Vp9Ea6J3W7Ckyj7ILJ9237JOZopEluwQNW/hrA5E6SbaNuuasEZUyBAhpgt3

LcsnP0VdJ7huPW5ek8+1jYAS8jpGwc3fx7Eb8525MXxjQ+izuwDL88HasWA+G3d68zDDbJPWQOJXIo97

Y/PoKpIEzu1Y7EHJiOYMoRDsFQvf5ChXOTV85+jva3
T75qN6331PfnHpai7ViRvh8UtJttxz77tMzOQ483qoz1MCqJTKIb+UEiRupvvjPcTOmOhpqVu+m8N2Ye

FwzKzyx/Jg8CwoIQ0ogpGDyWcvc1i7Wc6ci8KmwwVscJil2+CnvdizpYaoGrDdUdLLFPcIc87EBTnCYH

BXIEQnU685ls3FsHd+J3X+BNKwZRTNW5sfhRlLugXh
7RpnvErV+SQSjELUEi5QQ75pAIsinsew7sISGp8GLGmABEqevgwA3uE+wkn88izXL3XdS5RfosjELXGH

R11Gqe9Jh2xMbNrxtzn7G9e+swqfNtIhlOLZsY0D3tUyV+CsyzOqJp3tF7MqrT9EAn8BH/hflDeSRYdQ

zUm0K+z9V1XIMBPgxgugMwbpDaRQ0sUPd1dIoFgW7Q
QGMsPK0qz+2KAv6Tvl/yZ6SMy5Q/T5CTIGNvcVoloUpJEH+yd5ZG++qIynCDMpmB8KDF+hRkXj5JnXkl

1d9EIlpKap5Un3WZn+V1lfiLmiWSMj6lutFIMj5WwIOHnqVBCMmuiT3O9tmUkbVnaO3yapFerV+fLTzz

3VXF0U7FIGRbHGY8aA2myfKRTU/ay9ZE2sSEnV3wzt
JhdvWhCmBb2TY+vZP8LvP6itbmYn7sXAJS3k1eIrspO6aPR/Y/0cfs5ys8hkN39jd4JoJAJTC2Gz8uUO

lYwWpCvTpD9nFAk5AyLuOmxs3J7cF7tTa11IyCf/GWXOe0KWUrcz+BXauzQRbWFVv8RiHC4AYRgHitdj

60O23E3Rr/JOnQrMy4U7aUIjvYPVqhasMYAesvXdiE
/pJ3AecI2mXM+avq0fBiyYrdcDZ+P8EMqwwAUB56GcW8rnuZ28Sit9LQirJ9qjbREdBoGa6c2ih9tQ+f

NFa35kCBtFIgKdE5bEVzOqFyqFkhAHCo9uer6TiuImfFJFUx4qMvg1/OP+q8AUFyO+vKR9K83FBOtUzX

pqkVrvGeU8nrst8nR2yp/Eo49ZjxMC/9sP7s6BX2GD
SSV8U65ohsCpD293e61nIK9XMGF0LwQtmSmevXwApdo7MGnuG3t8jJ3R5o44I/BpxM4MpRxeiSJRYfda

9uJBUaiaydrdlSjX7JVef1y+ZfM1O8NzrfvqANHizO1+m/De4KhsUowTf+aSlO8lh+qgQrtLHOxqmiD4

rqcuTNsVRMx/oMCRO23FlYkyUD4+k8JMwgrLUTqYhU
RAOsxuQ1aFc/qbSQ4OOmRmjlkgZJRJesApOVdKSSELayJpa+sSh+dy5Tx1RewtrCXUCGq9MJRYzKC6Ck

lKwshZ2KmuJuTTXeDUN79Qs5Hx51O9XIwt646d5bet4/CUU8MjxD/TBHtYX61VRPdrG+5E/3AMtY/Tn3

cApxzsUTI+/qwlc2VC1x6X88HTMPWJwTSNM/joG+oO
/W91sVpoIkafW0jEZecSXOg54PkuBUf3f6jPi7R1zWJ60zhKhcuAFElaer75qJfYYyjcQnPxE01ufxv2

4Pe88h2L5UQL+VtBJR9M0b3LabWkyG5PQ/kND66uaIKVda6XFRelQPTSTGO6Z877MkWf/2r2SJK/4i3n

Pq4a4SWLCUPdFt8GJbKqi7w/BQzh4hhoMMwVrzhJe4
2zKU+zlBxpk2nmebg4frjiogdnZGycEFBvLY7IA3/KgJWDVBqfthfrgTAcpo9CBVfO/ZeRyb6EPkrv3z

41WqVF7neCIIG8JJjP9ThrJ9UInpSYtx29W5azK4+oep9RmwZAtk8B5eQAxtuCD3ZmFPOG9Mtb2lByT4

Qud/3wBirmQBDnshe9hQl20MzN+FHUMcCHfmy+mqnG
4m/ZPG/KklzpQYOu2w5gpHKkQYQSX2T4+ldwmrzugv4hRO4Qw6iyfR3Ni97dG2BkUg2eNxUVl+U+z15P

vsZohjCEROBRs8rDID97wRweEvB6f1Uuj/vtfWwZHUYfS/u/pQ5GQLfUXboWibBOcDTjozbNGJt2TH5z

SlPQfUvxfxeQaiXrma/mw2Fy4SUVKqsvqt/7YZgK67
pjj/FRN2XlF70nYWW5n4WwhkQulCRvAZe422vaESZ46iCtqrQxWD3s7evwm4iptsMluzd+gbED45HdGR

MWQiiawo1pFxYAjePB82v5xXTKd1fOyqWQY7laQwIxeFT5VU9kWNF/s+o5Bfw3IwufjRyw4FXkO+Cdem

4w0oMlu4D0cc6eqx/etKV4r+sJDShfaoM1TmLChbei
ZEnavVylwuOSHaM6lRp3o/SbkHWqh9nXNn+rsQ1YMR4B6/Ws6+qHW22MyX+wmTgPPzecU8SWAlm+dsaA

fgtyP6nhUpuGBaKcvXS6epvWPWH/4tqAyr7VWulXKztjDnP+pm5NMsBz7zKhqp0kzlfaKIFCpsB2SIGO

ZQ3o29ZhhtZlsONiQXhZ0saE8tkh04QpzA9w4P25Vq
e/IEZ60qGv3B20R/YJ3upqpiu9vA61+3t0epm+poWjdd6Whd1909KkaDsn5A39HFO69kJnWErBHOjgRs

X8ePtaR8zaAW2dGapnlf6agChy/cHMDWr0hTmngXqC6xivHbv0fhEhvjeqr3fBTfu5bGq6BhgcByThjG

Ls7I+lC7v3z/yRDaWn7ei6EpFBhQmE1Efe8Fk1X1Cl
V531biEvsoYAD4+C8YJALaIPTWbyWMKjZ69D211JTtbnNksNRIBYLj9H9mpST+Qz+TI7xRGSkD6ZzEPM

i3hLx+kk/dc8CVvHT1IO2wbI7f30MCmRZEjBbVxErdIyLXrceFSfqeFq+yz1TUK9T7i+Kv7Y4MvCcVmP

aDZTo6KjpHSwDoNKmEYBD7zj8HOe8nbLrcikWf+1x3
gkqx+vqhasUDupyGCZRRcp/bWVXQ/jIb71n+nco0C2c0V7JLp3wKpqSlUs9DWDj/FYP62s7gfvNalj2V

ED7EpKnQK0xtjq1n02s0UL+UHgKVNiyh/pzOpFn4MYvBjcstzlLtp+tPlS+6CtZDpZ/djv5zRIaqrkjx

925kXckxmTG2QqltLay5nQRbwyQrxLjVeXZ+iFPmdd
Iifjo/zcfEYK/bqIDNCARCJYoLVxgClPSXyHDc1Ky12cv8h7/Tof1tGcIk1UdZNi/YUrvMvfCmCI73Od

7TMwlWBEefdBPgskhPnf+Uj47F2Nsic/raICr5zKFQd85KeHmk0OR7vuG6JO89sRIEa59KUooyj+diuk

G9x/gMvH1hp0E7QIhsauASyS4OeMB73QVx/I83y+xz
4WhuRlgFXWEuYJCu90n4Lm+qQTYzqQIkJBhkIVS0or+7Czz2aZKeAKeiRyUl35EH2ysLXkiv02XWaR8W

TXWF7CkrrCZktZDcazgNZnJdMVrivn1cRj1MhN0BS9Zie9sVQgvypDadQ2XZLk82MfWWAOf5Gm+K+bPX

9R7TlKL8LKeEtYq508t8GbrfPjIhvxt5vZXFghModT
Lbdokl1pSixXJ7EEwlxTEbhN+uOKy0DcMbJiEwLkwdwnK2nVLp3OGT28grJdZnwmSkoiWLtN22E+qid7

yURw8lcTMVsRXA+9kQY7qa3WEV9YhblNGjz3LoTQI7GzxNBIzzJ7iKLpU3Px4nHppdt72OmEnru4fDrv

QhSjsV3J0unKEwdg9P4fUna3foAzIRtVOCNhGzbUyj
ugXHvFYJg8DkoFax5ltiyK495SOZ8IMRsKFCGCxL/TgTpcPfwW/r+nenVIFzuLdAk5mIorjL6LEo4rKE

if0eEc0eWefztT4i2B8anZwZecEuE4Im5alf962mDBxpG4WmktIOxkmiZgWfi1VMqpu82QyhzdjFFV8i

Mz/awk1xp1WLm1P54FArk5r7mcb767ZLj+uR88zKHb
SuSJvq9lMsNdBD9ljJZpdem5R8iAYX5OYppYisLtm7LWXqJqZuJFLKIJpzl+bEOKJdeZXpadw0r+4mAw

6Rl8a6B5/gwiTv+9fT/+/CmyyWvMv2VX0Gfhag5P+CD1rHL+rywGC7fYAUwBfHHmqHf37RWtaAYhOS+7

Zb+blZm1ssCzwF15FuRerSMRjA0H3SjAL6ZhyINm2I
fjyk3Q54F4wW9IZ6J+3Wh8JoTuuQ2shKbEztDSxwhZH0Y96LSERJ29nxRhoRTBcsylqwxROiIdcT97og

2Axg0+NSHIOw2imBOcoFVyY7Zab3n2OA4rnMXmdzR3lC8ehxrnWGpfpNk0qnUqlZVKzeQl+2Mlw7+Rbe

16c8kqR51wpIHADs531CqjXs1H8ctdgLF/kyPccjDq
lq4Aoy+nVCvsnyUHWO3FkTPyqCnruiyUB9quQRwcfZjJL9PpGsqPdLSsgXFq/A+5t9JBWNRPMvo/Qs/0

zDilLySQKCUORuLkgstP5n2c9Eu/lkos/cs6R+m5SHEk2r2J5oJwskucKn+VaMSsGGaJVbH9Susj9kQt

GHNed/3mRR35Ug+J87IKwYsHGXSQrxbekm6g7lM1Kj
4E+3+/ua2QVYKNTj4lxpt5kb7gpLv/Mwjd5bTat3Qc9862IH0H/5ClL8am4c5+UWGsECt/1+Xtd0zAfQ

kIkdDy9Q2jtuA/ABC4lg/yly+lktWWnFI1YsihGNzIgzoURAzVMLuhbSiQlGYc6L7Ufq5/T7pF/U/pNu

X+w2k+w9cEvpmuiYs8xmcQA34VlTtPUlEMaa5ctsh9
quD3UGnwIA5S0aHEn0ZKYhjGbuDeo/VVCsDvhLtgLW+8+cFk2SL8etws3QB/yaLllvvGqc5+8rIGBG97

ys5jMvtVLfEaNxBd1Iw2q5vL8Kq3IlGlNBz34VJLLOD7OzGFp0Y7D+XNIxHPc/Lkhjeapd7LvmAYsm7U

W/DDfA85vG/FIrsEpeFFQgU/bxK3seDj3TyeAdX/pG
IGZi44dggTJkdJTZAHiB4O4dsyQf6Dm3q59DmgaannELTNrXaKmtIQMHmfcUapE7r1LjkOEqgOeagsqH

sixUu9iUiQvoXh6aAQevkdjxgJZ2Y9xRWe5idsWEz+WJd/1ps/EjKrehJqPKiy2BODKafnpnm4k8+MYw

zZaEWnDXUqef9YusxLJxbHdS7wgc/vOm1q8JGq/YT1
8EMdvJd2vBKAAdCmM2XnmSMi/n9wRBOdgffmz+zywLJ6p4T2RFy19X6LT1hR87bu3kOnraDE6FMcRWPX

mYiUr2Agu995DSfa5yY5zu1UG4VEInzlN34/u+8kXI0F+2ulU7g9g5Y/Pmq0ZQHOk/+DRRiKZZ6399/x

TJQq/3zymw8lLajwK6gf/44t5N7sCqFm5ycsRyg+4O
swtOH5fWNlTzIGcvuuP1Dozh55JKYgu70DXeEFEU/Pz3YI//W2zmA8z/XtkIKsFWgVygP2OQy3U/Gw4i

DORHbS8b8eG9Hl/pdIjF5tX0rz3hhYeGyLCIlkb1EuRs+pIMIQrTfiUVqHbkWqb3WPi0mH+72wDAgRVG

WxHY2Mp4vX3AF/v0/GnfsnC7L18PxZY1zCr5Nsyq68
8lixVgJR/QBwC/0G3fYJIljeQnJMpbUrizin8U1Z/DApFrw9zuJ5D5/PHDZr1hqoRDRP2zzdkQvOysg+

V3LkWl6li4NIXGxfkiasuxo5Icc1GX2jS2Sc9pqHW0y5T4hUif9QuGUCIUNU8oM72JL0CQAor6rI27iT

H0k82P5PuT2NE1WN+NmlzOOx7QrhhPTsOpFCcr7i1J
W+lSm+V/G8Ok2Ys5aK4hDqEG6BBjkLaJQYyfV8rmmlX7PYelL33KW8//aWzWXpg+Fd6OFTxxXsBukcYQ

vUQUS/xaHCLzkkeLSq5bN0AEWXswt6Bf4mDMrAgZVx6izLeb5pDe7KMfOsM25qhzpE1SQuj56xOVERWc

VG5TscpaJ9q32OtngVldBdm9YGtdrKKG1OO471Uydw
yGXa1rzVA3+02jwn8BgPAXmdesmfpSXfDJ3KI67LC32Egu+Eu0FG74yVYy6qf2zm2e5qKx2dOP2Utf1a

7HxKxUSp/L5IiEyaA0v6x6vZuVXNYfqxdyOlVF+Oww2ckQB1NUn+WHmDN7kcDfEt2woKplyd5SWGv2B0

IaflYqcFbQpulnCp3ksewFfESZmvctpnAcBwfHFTCc
AgZ7aA6fXHrLpIdOOeej5c7TM0K40EPQCgpBXgG+YeEj19OaPnsf+XvLyTEobZ43V+CRwVBO7E7sSsJo

tkBfqdhKJw7whoFCr33XEyv2RoeXAtF5l30PR11l0haSDHSINdNaWI+ijieUEc9aquEJJPmDvhHb3hKe

3R+j2T8kzuFegJ6GP8dCHEj5B09JkB0lnNJDckU3pT
zAsLOfiq8KjcWPdbI1LRM7MGgIOVOPPVnQRFuvCHOuckDglDzr+s5j4dZy7Nwm2hIilrx/8dpo6nmx5c

Z01djDtmT5xd7o03x7uoGNV9J46m9zuS0QfmPp5F2Bh4/ep8nD/V63acrSpHBd4EGaxk8Pdm90RpbaCQ

/R4N/3bY4U49ksk3U1dqK8hQwIixsbupqWMgKIkIb/
KwuMu+FHvdcgQxT1jwCOUPxv27XNsr5oD7DB6rORS2yMT0Uc/O3KgPNdJCfmWcfQT8PDdKzqqo2aMv1a

bOIF7usMD1EEUH+PcyzRWvTpdNAkvnkQz5aL3ciz5Pf/A0U1o2yyHQeHeDfYmR7PV3Ob1fDxDRO9kBUn

OBwHroL69UiDoNXTziCZrB7c5EI6+ffigTnxzcL7Ed
qdG8kgHNbTMHbU83i/A6mf+41eso77ZuT8myB2YO6k4vcD8l4zqL3QEdPHQlwhblfPAL+Zh3NdIhK9FF

8Nf0FKbkZ8VQpDdU65CFzKOI3lSOCedWm6CnS8NBdWk2HRn9wehAIfKl8w+iOeOdfh/Eb22EmoMulqkD

Cd4bKv1L1Q6InK5wCZFoTm4+MMwNIe60wR5N/Maexu
KhlEUyGszM9zTOAj0s6Nl2Iq13ygNx9OMyn/Sk/1c6UQCZMZux0yvph5QiEV2XLcbhQG2Ce+Rd0YApkv

BZl9z4m7/KHPUd4t3Ct+H2dKOTS8wVvP/OESBjFAt2sVBN1J3jKX3sA2O+15C+Tzaa6/zpiKy8+/aQG+

sc53kExRi7/28BBHzibX2XJe8fyr7ufbY0sObjCURr
EpHq9DACpZaFIojZ/nqfVG/zWMea2b+uXd66PQjyr35yXYCEVlUJe1d2qeKgU2K/Xk5xpFS20gzrlbd0

CyT0POvp4OFaJnjk0cyL0Q2QelycDwaDNf7rArP1gprh0dcdzSnt7uK+0eMY9bMEJ0cn8uSJsEqNMnQm

vjeqDkZn3dKTDJJK0wkGP+qr0GP+si3NXa+khNujVx
7HOwGeO7hv9eJiKiRP8+DiZimZWPkQy0YmPsuVBhnW4D4jHMStsSHa1nWdvOC1qi9n9P4H4xuMhFurAx

2FZhNZQf2Bxy8plve7Yd769G3m7/lHQ6COYjEJ4h/APB/deHxBY8/yAW3wbW/JhBHUipf4ta7YQ/i28g

UYyQLWf6dcSoLmpe/NXwMSCJosIzhV8T8H3H7x+Ll6
kjf5ZwF2vsUhFe/s1+UIaQHxgJUCotiCCDhfn4oBK7HSq99EKnQgahoiUne38PaolHBLob/8VMpRca+Q

AsaBuKGorPv5QBP0x/opAxjMjXwCcayq7X547V+GqNekgMsS/BdyGZLdsBvhcViDgdJg0uj/uaoYZfQt

wMGzxM7ccA8jcW54/Ab5QOsvbx1SmuwTcfL2PcO1hG
XqCdiJ/YQ9Hbsl8m+zPSYAd43tyYzH16ShccAtwN9sz/gf7w130n9A3/TRzN5U4YmxIqjY/9EmborxRe

sM4KoikEjljEqZv908KZVhUiXdIP2qdphqVWixj/lim59ZOj9442gPuRBxudqqSoPaV6273WKV0xW/nv

CNsZ4WvhOg8qxsS696rM367zMSKvD5NJK7AD9TRHAa
wbBdzbyu3kjuzoozYo4cqoJ71NnRK+aSwbaIMGnVAep0B5Q6nuXwC2arAQgAQnnm1mhRsifDe/SPOLCX

IfjpfO3SsrhJNc+qw48DS8BHey92ry2p7Qe2UZTN8zOMvX3v2xY0Kc4m+JPa3a/py5DFnbB+9xC4CDY8

8d/9mx7C0wIxnsK7pyQqtE4a7pGifa7eUAu0pWwbBC
wET9NWibEOEaOVKJNEbvhpizeSH5YnjAdqR3lYzDSj4ZTswrlxV4XnBMxETT1BP821e8hLK0zzLHR5Wi

akR340B+OMgiXLPZ9+szTA4W0jt5P2q+ZP9DnfmpIvfaSVOw9XXRdCwhGxiy7VkE9Mh3rHsHnooe8xxQ

CUDYdLmDenPf6Kvtuvy8k2/MBvT4jCKHLHSo9oDFGD
tW3scrN6VbsdfhrVOGFfVXUv1Ok2E1lmIJToTHY+s51fe/y5tVOwSFPncLzvj0TjeHb30gJz0EKt3ziI

2N+pkZr2Y4QKwz01RqndskE0FuuUJJgxZZqKBHj/8FjHGb/59atVZQ/abgmvSBlOWimygn7/WJbVll1C

JBBqmKmkSw8duAHc3Lm2+IWR0hYB4xLTC654lbBCFE
+AMBw8qTU5w5LzjCWh7S0gMAXCeY/VQ6c6NYg5ROMJ77pnphtKRHtE8gX77KPWsHzdsgBYgmjbXb0fTJ

mQ+Q/0/D8L0ciIBClU3Ynmaut+zL7KoA4rdDqllnJegYxWPtY05rCoafkZbSyzKTrB/hmyyZf4zqVlxc

gMzFcd6LXHMQBKfW86XinSyvZWQj2GbUdMdbIqqc0X
XvlzAl2JNcyzqHuWfI0KWIco/hlSzHTN5Nd+MSXtm0JjjCVBK6DrBMmu4ncwsn6TaM4wNAmdJeZ48sgN

DxV1Vhsq53NsXic8EnZl//VvCDCzqCzd2wut700k/8pK0NTqL0ETaMC42wQ9Wh/tfjLYWk+HZr/KZ+rJ

X4XK37+0B4XHZQrpz6NgDXrd/snYqNU1TDmaXIIguj
bQ6AsWQFmXGOVAl2AWIoy/zY4LT7GGHL2nCLfQnl2MJ3BSjYrAdtf3jvujuPxiv6LvW9ejVq/eMunRz9

D1d/zltsipnaYZ2GTY4Ql/SBTaUa9thIGXIAnJuUCNIW73JtZwENzwSgrae9/TCXDhapgVr99aHNe67Y

IK+DFDFhmLbotelCSUsnfB+H1KygWv9MRL2U9rbfd3
s7qva86NRn+xLryUCDjeMbZAjFMAfilt2Zp9ze3WS65Y9tadJxreyjximrpefLXS6DoYWYzMvwwJMqib

zYYCd5dCZ5pt+1/Nkw3rmpHnd03/c0nJq9e+EiuvJOZTZPDJ1fWbWTUhz6vywga72/uq3vhpD826Rhj5

O7/839HOTy3th0e7KTYUWXn2TAxQJpNrePJ4erUXMP
0Vq/BlERIGP1DybdhaHUZBwrO5PPCAjjlFZNnEqD4duBJlznypaz4VsNpbyKxFV9GvWD+nYieZJW5hhq

q7IvAJ40VicjB25WLIKyJSM3p0dbj+ydO5Rh4RWtKZH5Ts1xONuQnIcv/691fsB2ZmiGBz7ZNdixSZTY

3B8lnuIDT+zdSPfaD5s/+M97EtWRm8/C/0VE70GWvN
Bo5n+UNgDCbYmsWUtyXRKa2AnjTjPNkkzlSOvNY40bpr6pM7a3fKf9bCOAaE1rk0S0vXjuSgkbAAjCNJ

FBmSOebvwahBI4R6wwoPF/KDPF5JkwzYJl/Hnzjt6Cc+NkbWbjQU8jnyvcPQDXLaUbj7tpwCu7lNTbtd

jhU+jv+t99VyPw4fo49FiPKpUQaH7OEqSn8Syx/QYi
9T5UmRLx+tDN6X8FUdloCjfIc2avgC1kACognF/V5zOHd4vsTHqYmS9+cFBl+pwmGxVxYuHynczpNJpS

5mzonAT8A0ItSm2Q/uwzsPm8KBPnVD2lrpgf+Tl95Rc4L3rfNyCWbp+ldFPfu8aaAxt0EVXysMpRjv3a

SZsK4AhDzMeVMh/DJT56Aw/kD1KM9vz4fBqN01l80G
S+rq9NpP6fCMy0EXS9FZcx39q33y1sch1aBdgmdtUr5V9JJ9uZ38FZdh0SHBSpG0Rssgez+7OdRDpZfm

O3aeziRv0eyvFmiZtgkBwmnYcf92UONz/3F/OfUj/ke62CDITsqmbswMOb3wKwcC+9jMlHLmLziM9Ayl

jRfwrq2LCfq8eyLxbVl3UZmHe6tglz/pUQ/v+13L5X
IHewrqjDSlbc0GnZmiDBxyzKFeKGV8trOa6CwewC8RJH7RA18kXSV+00k4YJnMo6oQog7WpacXdSMBXQ

74iMOcxmjyRPskWYcxPpqUOpwv+I5/8I3T54l8AEwGKRNACwvgSVETtdD6VOy0uuCoyLnsebJ8UWo8dc

YdXSM55eos3WHMcJk51ZYbw2b4mrEZogCnpP5Vrzfj
b4P5cxifQ7UJD3SEqwKThHOI1zCKea4D0Gz3F/q++U2MrfoP/1ch4xFBu74GklVg1goIKr7VsrfHTDbq

IaWrIwUjDWPkIvMUOiYIK1oavBRLfTnvRBvvSuV1gGiOTnZ/aJduxJd5wBdp3XTLPAjX8XBIzhB5macr

3nSwp0OQBdAva+oDkv5Ldeo6bkQmSUeCkWCkXvwzh+
qXQGZx//WsslCTmDqSsLHb5eocwYtmN0K26WRJJQ7ZE4kB5LiY2lgvy+EdbBfKIIXJe/x1ANDcxovgA/

j3L624cbqnCjWANS5PeEzmsKJaSsFFi24GqFTBn80QG/UOQNinBZT7TBt4KCh3dvMNj+sdov1pd2IEzB

Rw5pH5/wcdxP/bbwMAvHDwyJ6uS6WwnBSHv5zMXJUJ
LfJ92tCcLv49ZOLdvCUWERN700aUP//LH/WboGiqQaQYcRn0cWD4owxbnGgnjqiMtYvg99S6Dyb8jpnm

wFCvTMcQj2TeH2G/jPqPQHlkCx+vxX+/EAm3QsuewtWvZiyqftiCMXke4+E0Lvot7RLjHZWuEjFWglie

NePN53HrpaOgm1SoEmQV9qNwiLm67mWlh3YDWHjtaE
j2A+B2QckxqNaOvfJk0T+LIT0dllRlGRBc/ZEihqFiv7fYMkE8mvv+EnPb1xYxbvHgY1CcK3lGmYUOn+

xQIbYSPuXEezYY9z7+2bmonV8E70IaFqryF3JIBt66MxhZoQt1YakfAI3szrWvyfokNIxmDbUdBB1bha

SP5Gg2Sgs8WxvbOYFsyQkDj4oZ4WhkvzTSAltjvaDv
HherDQeU3/JX/pdNYzGXMdqp4kz3cuY4AumqtL8+ZLWFLNk+ma2wI4aTaleHRzrwJBe9wUTuHk5gleyE

iQv51By+WPTrdF5Ms88PfaQBsp9a9uc4v9g7UH3rPhwdcaDSS1nkUOcVp1HDZgw8RHT1XZCsEvqq91kN

Q5KnH62uxk/7pc+bRGGoWfvzHWgpQMNHQZ2wkl97SS
pv6rSI5V9lt50+exfM0xCFVvCvw5uGMxXM9VKeOgEa/RMf/bn1NhBg6o874S6BPYFPLRb1DetLe0Txp7

SmVaNcYDusBOa4njeknd8weEccyoUncUdJWMXrmFy9YBST0xOeQNlj8Oi+ujtw0myWKMJgfWGIo0AiV6

7JzojicSfcGEROPHGZD/wlq3M4eSoQNdvDkdAMVrpy
FQ8r0YDX6Wnp+fak0yzI3mXZInA/pGnuVJzSnatpCdGSp/cUa6HmmKReeZKWPDgEPsNmWp3yJi3C976M

D1yvdkLdkAcZU0l1v9sV4pXVYVMPBck1ouN8CckQWUSzv9R252x993nsofCkuiiRDFAXLv8HdQr0+a8x

Fvl9QwglrryNJ/sKPahk3NdfFUMaaTfyS5INJxeFTl
Hvq9uistwCi7ipD0NaY7O7PVFfdhQeAhDsAa/qaRCR8tkSOR1Cet62Vh7+ctYXZfaoKPnZZV1euLdgcJ

Cgvd8BKTtsxk7tHbcT7LJaeGHirfDu6pW8uZ7sxzs8uKC/uSV/AIG88JVQ4zrsLMde8LNztvNbKIhkGW

2A6PzLMKw57dilGqd1Tm/qiNfXfcuXsVOkMQ95HC0Z
/sZxGVzdEXxLuerblcI0ANATWdJmwtI2I6x9qg7+O1XQoN92oADf2+IINg/OwB7cC50ocqgJtpS1BW/n

n1UoVMZgABuPSizXc5xqGj9dsEX2jcj1/1/Anirudh+LkKkvgFlYfrc9max5fDabU5022/WCLH96031+Ol2d

BbGHfrVomToY3V8b+6mwRg+snmb+Hczk/pK+TyEoR4
v4rZfibCBNpX/jmVMQlirYGiPq0/WmGc7qG0ScNVU00a0zzqc9qQ5R/w5dAzprtMpE75gm71DwvKzoXP

kK7Ydr2D3eep/cQAE7twX8eyAxa55nMz5NdeErJiNLqzv5QpNG+5dPiWQ6Z12TBMVX42iDmV0v3LoK7q

Fgdrnhbt45Fa7xD+/d5YbpXYU/o+XgAX9zTJ6zcxbU
0Om/jPpFOXnnEhALOF/pgRbNZRPlakLB2Nucy7Q1Limk/pnjog8UUfjLNAEd76G79I5i2d3RyRNrs1is

g1uCdU4+dUH7hqvB6XaSEho1j91EUUviKC11XNS4cpvA9ZjVG3n8fWXo12q2LbPCWdE8poGYyBY39M/R

zFuU7gZSm+EbUcdh/fXdYo9fHUqLwTG6TRGAb173bO
GFZ6NOBoKSIK/Emf5HukBh8JcFBIVBx19xuIPOEXEEfXy9iy/Kvrjbdpstytd+rtvkM+vwmhB9KwoY1H

Izg4qadunbL/pdj3b58q4/ED+xKJb3jGqDBVjmCZ8kKhbymdeYAnw0vArS6N1+0EO26tulAf4Mw6PnOV

KoXgMlUo3SZ4tsLQ+u5Q2wtEeCoa+yAbrCkPzjbduV
+YLQitDQSf6H/k5nVp4mQhuw7EmgkuO2KoRR7aJidOgfHAP/71DVUt9HSdhFFmkuGh+ipw4OIpu4/64L

cdx76Lucej96DKtIhdjFCZpoEJ6eouR2wDPZyakM6ACi/T7w44UnfGBlnjushP/47Hez3IzImKaselBI

zXq3yz4ULENGnefqRZN1wtk+NMPuN1PTq4IJo1pGER
tGvIiqRXRh68bKgThfROWskQWg5zfHYffn9NbEAjRmyCFqes2Uiqo3KHRwtQCTL0cYEug6Eeu7stMjJj

C40nxjBLzcnGWrjH/SiGo+yLOI2HEzrGRNptLQy/sb0T0Orr9TQKe5OFVWqvU3TTEQ3xj2/GdsXCMVzw

T72ZEQWenqGzwsZzyqChF7sbSuQtwHBeU6jtExTgKD
edJe5soJ2ytR6su2h+4HDv4ldG7XwQMOdH5Pj0m2neQ7YdKjKFZROw3PLsY4tIQAVFSTcbhDkdckor2m

DoqXQvcIcFZjgMCsU0QEmkIsKLOfs03W0AdW8t/evP8gcK1tkRY+QLwiPCE6jnhoEs8F3TNVoPu19nNS

/S2QlXioCStzCu4eEOmEE0YrmPck7VbT44NPLrUYZL
o1aU9Evm3U8iyWL/31uVcZq9+Ucc87QH4VS+bOEEzip13qGMBc4aWQnaxDMrQPFRGlsAMHd6uAwuPO2V

FMOUSfTd2rLi5+pI3DoBjW/C1RIoJWZtB9i4I6VePtoATcHDfP3JLKZFnemYFnimUgXa5UP4lHRCde/G

/KRyzrXf05TfnHSXNvU8GABcRQG/G2WponF0q8Y4fm
jTpJLq8TLUyri0Dq2nhxOCEVU1QuAXo2IjU1hL4JGHbOb3PNMqAxQPPB9gIHXUmaNSc3tT8eM2BcJSRC

z9/7mAajLxXirv5HaVCqTmCy05HvdIHV32ProssnkoPr7h9OVj2CnYN/tkkQdbmT3CWdmCwsenIh5u9X

atogysrvZ0nU5mlwOC7dqer+CcCYzXNzA10IIvEMkn
qjtIrQsuL00oaHchpO77MrYiayextmHCA6K51010uds1/ds+Fzo8vL/n8i+TfVmwpWATwy8q3EmkrDk8

6B0gtvlz+Vuw+PxD5r6C2cCsSpB6OrnzoJhkSHn0qBG/UvYOv+TZv6a6BulrSSSO0QkvG2Yx22GiYGZf

XPYPdLi5Wg9F34UqaK7cl1YxGolU+1QlNsqRHJixGQ
yC5pV95VzGzAcoQyfaJGtizXG4We6KxA4h0hkGYIkxJL/6kRZyj9/hi40fE3+IWkWHUJotJ4QMn9P60l

QpTMOOEwNxN+wOVyPbqQiLqAoXIeT3P+O0MpcVe5CXZCKrKqnp969+GUzeqJNn8Kmjr53oxXt3rNmp5+

wGGsgQwU6y7Fj17loDdPucm0zTLTvMF0Bk/tE7s6s6
7e9octgTYP+80QF7QFtSvbxyMkM4kOYh6XDEB82oN9d/2dyYtdNsknWZgQ32wkX3uf+OKTR7Z/GFph3e

imMSrb3712rwMti9j/gLBegbaX8G7B/qnB7Jf4B7BwZyH13AqoN/HgIsp46vcJ3Loluy56QXdTNOXm8Z

mMYWHOctsFz2zFOqEeCKFUN08WJ80kXr2Csi2JzGDT
X5O6CY7uAEUegcBqvk3aTpuc6qvmnscV1WIeILXWOuk5W4e0r/6HSEbfw5WOvRBR7qHIVE0UBnAmGIbN

zBDYwXcv0h1HDM+csaO3/+4JmtL33IaNDaApQKrvEonD7LSftw9kjvCq0hmxxGlaBjZqeS6F/TwjKew4

7/0K3K+jVZYVZP+BKDEYtmPh+bXNmaEX7LtrMSt8qT
gf4t37FbcHlu0Y4wVYeNkrsUgVhh7myKYA9f9JPjs2Ni/5E1GW1NZ8YgiKHDko4Rs4KaFZj9ltAyoUSI

J6OuLubR3aCubuFWEJZuEmVI1qqACXuVYyHQz8uLgcogGfuZf4+FObUkWxbgKI+BW5iFcjFdcoBEF19H

UI93NUQoQ6nfSPsWBhZJ0oRkl0dcAj8Y8Tt0OQwTsl
9cVey9R782Hbscfs9EFvwiDOnAzUCVcOuKU3XBwUjT6y5GV08aG1vck7kjKMARi4E1uqo0LAscuqmVaQ

PEpEnJ93zAPIlq3hQOk5z929RuDIG/QY5DOY5/v+vftjRv7jGq1keg0x8tYTKwevnErC9yoSmofyFhr5

Fq/OT/fXfn4YgnMI2AOk8Om/O4vS66hM7sI3/oZ5KO
8ev2ajjs48AfN8M20Yv9Iem7TFW7EJ2MPbFIZfdPhpHC7TSJPbYf7FIsFXDHZtoEvFj3sKIpi+/lCnXJ

HXQKxnj4ih1UcjvYvzxO0A0mAF26qebx5+g4DWmLQXrX3OOk1k7Z4eHZe+6wTUBGr+zKNjXMVMdC+HFP

BdqjmcM4z4XQkDmH83ewaQgGZ4I8vVsgVNatakchH7
eZIlCJvu1G6bSPZq4uPrJx1yCCRqiM6atNRevoGUA07hs4ft7XsW7cuWn0FUT9Dxj4zbxtSLcqy1M6ay

FQ3pIv/0tiqdwUfjrd4geccdtBkrfVozyn0g+gH+zJjQNsDRuJvHk5e2/udXdTg2p0ZLm+exE2QsMN4f

3Jez//3PrW72YsxwCOTqHkfn8UK0TH4Fiqqa4R41xN
d1mw5/pr7t6zynKUX0WumpiG1uSLgn80ymauJcvh9ThAk4mQB6+2sxXW3zcKBfeOOzbXcR5pGrk6QD8v

Gj11cc9leGpbl1zrq4sv4V/uC8p1L5nSIDpQRXg1QjPuJ8fZD8AhwSgPPxxkQr40TAySa/pSMvLMirMY

dOjieP5H5aWc/5rEz7S4rsknJSiKYkPQ9cBbhxAxZC
Ekw97/IAgAnI+QjRkz8+ILU3riu0dNhgnKFG7r+lVUFP0KElsPF/Ww+PY1q5KdEKzVbxgj89NZt4JG6O

xuUYk8kocPsJEImrE1Sp1f0+EpxbtlTnEh2eizczkXF+ZCYH7ckcA6n5H/G+pu89ydTnNvZ6HFVGHpVt

S3SwvZjtEARrqSVQ28zk5xmmS9JiY9hlHgCBEhl6hz
zK0pMDe80+zKxoSDhCBnm/WYg7ZNJ/oGr0YTIoCU23wIpvAAPNBmw7NRFdkEjC1CmPQ3YhOFbC+QHqSP

5xOek15MTcoHUxmWwbJGy0bf9sa+YVDe6AZz5AQYc43KZpevXBsUMOoKT8mILeZ3ksUYVgjfx+GBqvQx

/dHS+KX/4agK9p4yJPo6ybgH1hlKypvZClwrCc7b4I
T8WW+XEA7rf9Umy1h0ynzXbL2utMrSqZGk4ohB8O4uLKObjFRu1J5SPu9Vz+Wcv2yXJmVA8WSEA/XbxM

Z+tMX9NotoxABwlSdEVdIiB2ME0gHI9d5BWfwC6eqkIZ0wxBuneVDxcQgivTeTSrUOSg6kxwQ6is6FRt

G3v3fsJB/QmjbHekRH7sIgUPUGhB63PLChPsWtXe0U
SVbo0vJHp/nw40c0EsWtbm1cKFPDIvmOR7fX90favac7DoNKJ4x75+/XoX4GJN5Y6ngVClzqr/AFy5EX

3/LDCP+BDEM9/9DMTPaw8bNfjonoy8wSVz+IIO/9Ud3ScoJVfteHG8ROvWi7yVf7eG24M9B+K6vpx3Hc

yYWENuI3XPFxYX83LM7t6n+aJHlYwF7Sjds/s13wFl
RHvFab2xjG7iGVlM2wjR3w1POYk7S/ChFdmrRANXENBaKfDZ6jEutOQhyx14CGcLyfxEa4mtnCsonSBb

l9/pRmcBGRZSEdGABL5azxExPePfvhSGs1xBjLvlIak2LkKzI5SRI/lgIO/4tggJtTgF4lPfmQ4yHgRt

Yumd6WNXYw0aU4A6QNxi79KsGZBNpI4Nf+sD5gmySO
miKkKoifJQxqRIiHPjC8mjut6p8Wd49XanbRcpS+88aLNRApt0cpIJ/ks6qbts5A0wt7NQR6Zq6ymKjY

byQJ7BzGtwER4cYl8XDof2ezcKYVJ1ktpv5eyV+JPLKos0ZsAKPfnEnuGo6LfpVwqGmmfC/dl5ItucIR

5rJw5/mTgOl+yonlnvfLywoZMiiD3iwmS14noN68cb
oJFHDPSCBxGdDozOGmL6RFmqZwtFst2N9z/vBYXiVsvNnSCSQCaycOvbaX4kCPXYbLxr9g+eA8dyeGVM

cTIcBV+hPDOSWuO0M8UH5r1aOVqrxMhS+Xv9FgZE0oA4UoEXrH5Nso8YtsBxCGzUR7S3Kx/4VU32yUfJ

6GdkGH0nKQbc3K+CCNeCF89KoQw/AvKeHAwh27BwLf
LploN4ga7t0+Kd5ULU54+dhx8M4X08LC2XGno0Ixi0qoIDKqmSdH4nbwrVaY9baeE/jn0S7ebCcgQWJk

IlqKYNdCGpvusOC2GgPEqwcv2gIIs1RujNrzw3jBH/ZDXtFWkw30oZNWvl5UQq2E2wbhtKtSJKVgW4Aq

NGKKqZWY4I720TPT87DU+pNnu7PcJSMqPuMW70FUvU
+wcBrlTVBX1DVUyw7V2ZFuscK1Wn3EjFH/nIpt9FCiAEvAfQyknkMv2i55n1/tqgsxldjbna8CmhC1Za

6rHeoxeV74A6gk8iCmM8wdfipMS9HgYG0/pYYzE8WA4qGxU+OF5F20k2tLw+f+WMSFHlf9L96vhm4cfO

qQTEoE6FSsLo0t0cY8wLoZrlZZJ6q66hhnm11c35eO
Pt71k/w1Yv7/tp9fz71bSB/s0//+kdFc9B+UA9kbvOYguQKCBYEXi/cOqi1FmhI8NcQFvx6aos0A78VE

KLPJ73WUgu4L/j5+WIR/SnpVbHsMjH6VzhECwPE/WLSjOWc9nirTwDrNSRt0fgATQMT3/vJF7nngoZN2

JqDIxuDfcdWMGu34gzCVsjkMYYIOZOlunsErmdFELc
ZVdfF20ee/exDqlUdM9mnMQGRssDtfQmnzqINby02Or6xPyeVN6vXOKsuhsu5vNCrYKPyao/UhnI8vOQ

akd1yaNm2ykM3L0XFHoz8ZGdVQtFxqTGkG6PnbAiEJ/VjTaSQjCz20bNKV/CHG1pzIxrnUfJtYGff/Gs

LL8PXUb5wlmtIrHYqWA90r71ubg2q9m0txmffta6hX
kaKv0HsnOk6GKf2Se9nx1zJIP2Maz3B6qj2HTqeiFj2IPWsfsSJXu6EUiwQgb1whdnNrN8/d2q7iDll6

tiF+OC3O0cG7rxqudgtxX7VTrLdwImt0WKEjnf7+h3Nd9o30rJ0CpdTKpODuU9AFzPOSNvKF64NWySBv

kXOV7kJAmHP6tLODQFVK90s3WNIGhcs3GoFoX8yRXh
JPQKXbKiwQF33p6uB2TE0IL+wJFGN3IuvsYlZN+rxVbrk4zkk/yzIFlDE81VTJR5IxH2zezqNObTOl5+

bjYune+NturRVpc4h5WbZVJMNH5gMeFDggDtJNAcGkw/qpFBWWeifKEUPYM6YlZsPRNxod1lWRrA51Ds

w4gpHxB1bWnWHeso6W2LqUSThveZvgjcxZqysAzknw
HwUq4yeoo2DIqh3w3hW4mLY8G3sCryjpE8fPGd1s1kyKYrYpY+dYuzjRwsNOJo+4QbEyms8SUpMMhPNs

DZa7H24wQo1YfYW29UrebHNeIHERcz/2Dvrb+i+s3353YYqNWwV6GEClLvUQQfbEdMQHjRjcLCUBbOIg

k0qFzGFQqypHe3u3hEXipF+3PXXfcv+K51f7g/7J/O
Wvvss/drP5+I8dfrmD6uveJG6G8GXnsJVbjfjRVTyHSRjE0nEl+odRT7ud/ViYfpkkUS4U1V44H/Kole

o3UkUKh1D57mlGgnh9Y4kNnR6Cj/1WPtzVOR67ygDupQ/ff6j3xMTR0P/cE9e1V/hSsWncs8EStdCZFN

d5azlbUT02Z4uFk9JZctsVV2+bdt6LyLttA5xAQGDZ
RSOQH5MPn8Dw+Ga8D4AHHy+YLIglVccxjtduI71RhgHzJheIlW43MismYJm3nZ69Jq1GlOj/2dj362pn

8GGxTGKM01Ht9AxqYcbDJKR8GpktCNPBqGsm7ri40dDVQdQYIEnfi5rbOqL6QEnLrHXWlQua1xAI9Xup

aPsB+eWucLBlzJMfAfrj32+efULPSmJb62h3bWXXyu
6QdTStYBVZH59z1Kuyl/+u61twXYV5f6tgGaLkqaNCjrYnfgjDlD+t3ln3i6fZ4h6RJyZ5FDFS8gvVdQ

RSnKUwMIVaNJ972FjfaYuM8Km04txbCNClKOYgadsGK21Ev59qQ4Ki2ChWGYClmuqwmvgzQUcKwthmc2

GW97JDS5aUaFxg/xkPR0tCIx7ARl0lyjMLDS99FSRv
A/KmQc7OjG8qrnoPrk/SRZrLTFovDFxzL+OnmIr/xJm3QJQeDMk/gxT4HKonNm/tGeJolGLfiWuYiSF4

PtbzmmPegdN+gvmA79L5v+bzdYLUkbVptv2JJwV4w1+nZANJF4N9errNqETOClQzMbryDfhHSCzHCDwu

VnvvZL4HSGx0m3SAIqNlDABDKTUM6Ia90yJ1rwHrF1
z+KKR2yzwYj4w+K6dYzkW8bW6teDpjGtN+NlopYBre+I3+SgnqFeq/zUMmufjK6UhIKoj9a6oawBMZla

Hmiia3nloDkmPeiNdn7wJ6Xxpsul+HUit4H9As7+l0RGlFznayJNkYSZILBQUf5tswB2mgXit7z2URNh

FBlK4xzqYK6AuQAUDoFrvsvdj32hD7XGTXF52n20cX
+IPctYWRTfikainNYLrH1F9lKdrA2dg+nlIuGPTJhxFgXcj5Zr4fIul+npj1eHUiAXX1wsIPynWFMNu8

PgydxNPYJcU6lfyda4Yd6848tAZrSKswmcLanpAYnG6a5hIKEQCDMsLiK+x4coroTEYGlOZNByk5Ciqj

2Y8gDVM6agzrrAOPxPf5873Wgrr6ttcZdR/1kMGpBs
7PkU+SoTiskaFJC5esMdlcwK1V1hO4VdWifpDtKhwcEp7iViLe1mKHN28Q3r7NiHKjiXE71b3Xz7hZAH

zTYHy2zekJGUgwBx8VpZB35uibUHKOKbm0tC5vrsedvJXt3Yj6QoOf19EJV0VOmAB4g8fsfQOVfwqkR8

vfaP9UA+sN2m2C0T9thrEVqNIuSzBYfJThQJOvXWDE
OsZTM657skGdMM9lOHEpphpknlDdB5XLLvGuQZ3yP01l4oYUkyVIbF4EMfbK1kebSVFaMpDnnlpPOCF7

amsMOSCS1uXvKzFGw07Qn3Jro0lfM8J+CaMUHEHYo5ci91EB3buIzgRfoEqXmkAfosfEmyiwffRNNot4

ZQPOln4geNy5A5cDZ6KSCKttjf19wMtL9dJoQ7TVDr
9Ztp2I8gCP9evBt3+N8nNEVe7AH4t97eIK2DRuyfRlml9p+7NxfgpwjaH1gAdeQGrYpqgw5RNyhJfZu1

x8iHJA2fvHFwGXGS4+OvQiW6kZ2KlVahkl8e+ZIHuu4KFJl6oATW6EE85LzJiKGpDwh22nQEav4wpR4i

tY+WK71YVKinTVn/LsyUSCfaYhGYD5Wn6AuTMxcqOR
ZM203soEMgbO16Q2krehPqj/b3zvNPt2z8z2Za2/0dn2RLnmcPucpxW4ntzfomezgqvbkP4A1lzyZ/5n

YNaaEJUZY9ZLVUozsCTiGrTztCSsK2Kn4mrlqXTWlKivFuZnXf6oF1pA8RHyYr0dGPow7Y3P+kgx+1FV

3fgVBlJbzjkxeXP9KmLVBl28v9uRqfr2TIR+7D3jTr
B6fSevnpxE72X+Jon/0R2EXb0WCd+TSLdW33PWSxuzrvx6vsKYYs4JuIF8xbEDY49pcfjBp1RTP2MWz5

pJrz/u/WTj43khuXn48w0N1Rif/xuRixiB106krPXnoiK6cwB2BChWkyug6u9PHGA0GPn/oSSP3T//3t

pO0MVVXwH0l/u2P3GK41qJlOdWe1nWG15KwLs68JNl
+usmMmdXhTMIRppYT2hToYxEWPoHy2/qiLNdsl4R1MT9A3iAicDQiR9uW8WimZS2yA+xPdlU65QM9JUa

x81t9cgVZkE4kIyM5Us0vD1bmilpX/TpSE2uPFqBQQmV+kB5cGgC3mr6gOQ98GCfTGj7A5Ozz87A8O9z

u6YwANtJcIic1X+xd6Scj1g15MOWUxU39uujlOberC
/gRQ0jODW6ALhxQg67+zc3eGrkueQWwq8SMDnsvwbp7CkuI3UYe3yckkbc0y9SCYRlHqrIc5kswZMxB9

rSm1I7S4RF8/fChC9DILJ+06WCXeai4rFbR9rSJJispw80qFn9aeDbyoua0pLi/gQguezAIoyUyVCKtU

cw5CgCljzkikbFHsmSkDoVv+mesPu1J5L4mYe2Ojs9
OwM+Sb6HfeM5Hj5J0dMTJMMXdWmt2YBW1qvtbpiEfmhWzZjqyPRDg8j0N1/reFNB4ADp91lo+qPAS7v8

vf97vmNmXiVZuPPqtrp8nOdE4JRzw4A0pB9UxTtifW9hhCHnF+QKkYedpNmfwK5O3/OhF9DJ68TrfJG4

YGGnGBpuGVa6sPg7Rauo19Tt6d7XQoboH59r6lukeD
Lpm3enNgdr4p6vZ6GHa+CZR3y8iAX/Knb3gWAa+P952/cb7KV50SN3GnJJp2+aG1hpxdtfsUG9UALxrq

s51dzxRzwzFMYsKdX1Jcr6dhvGO5EKqCpxkBvdwOnCpERIc22SEg2Bdzj0KuuTF5KWc77F9QEEdg0GM0

bFJ+lflzEqnp1171occx+5ubMKWPTb8iak0NF2g2A4
SBTLVrjkALJhq1/Iwnm2FfdgtbqR0WsdAlqduJuUH5ws581YvObgfGJiudQvHEybAY4H4GAw7HBF7afQ

vKCyHPDrhIUS/gZZi9eOLghH8L4skmgWARTcRWMwUpY5JJOkJjsgp0A4QmB1/JJR2INY3ENS1/bhapO0

Neo9MCqfozRMQRvC/3tneGU/BPSwCchVuZD7ZkF6Ag
cYFNIStoQmEhu376iT6o5LotEnNosv09wDcJZKxrFXh31NIN2CXr7pNK0421kmXKgvCT/Kf3BqBdc5Mv

u8rP9d8J/l9vwwKkp8hJRl61n5NezjPUBAcD8lPJ6hcPBqCYaOksvjQK/qNiDKzc/eRd4em7HEmJ978x

cuWTGWcHDTxm0Rwt6uY2eF5njDMLZKXspboiVvOKDN
4UliTBdDbpu+9oOhncMG7nYbKm6aFxt35f85gatjH4cfhnkMXj4iVcenSnWtnmWnAQaGh3WR+Po/dW53

qmZNnsLunp9TbeGe4N6bfdbT+8kFb45KmSzFcQ6gNcKcRtOf32ZSmqWlL8Y5qDcO/2Rbo2oChNapmjcj

0PSBVBiKf1/xT4KB1JEWuuLTyn9aHTW26z9tp7gstp
8FzRKIW+i829+SW89A7k/QxaBVXr8/tENh9s9HKtgtKhjQFV5vvKK9VvQ+SW5xfCqRAbvhJSZrxESe96

1pMWFGWcYfPm7zxLMLJl3DcRXlPdVVrRw/yPcTUa7eYZetGjYiotExSKulhNNr5dw8RSj8w3NEPl2As4

+gk3dbSGjY2Vc7YSqwGG81qGLGT9jAIIrrA6fri+FN
jSAheYHVXvKvhTXrYU3cLjx7W3yBuW33ceskHnLe4nvKHZkca+PWpmccnQiV1tbbhwTF3m+3TFoMA4FU

B6DvtPKJHGn8pN/s/AxjFXBAk1lzWL4fwewxw15NDfzIIJJkQicQm27aoSlfg5dM/qfnF0535YYryJzv

fYVGnYjvfwmoga+Kirk+0c1xhnq84tiolui/Qw7Ph8j
6Ijq3h84UTlgA2G9q8QZDDqcFOUxzK19DUWks59O39G/kn0fQEEdrXvq0OL0PV6e9r0HZsTsfXdS1jic

4+L3nBjVwacPDTNvKF9bMT35qbqKhqe2Dj3xLHURgccYcFWDfVtqDXr8ZaY0XjAalVyDPH/cbLbDGP48

Y4lyqDCS6MRF4jI9p0G3PurtGRdb8HAQ5M7+eie2kZ
hksUhSNyC7PXLdnT5SQEzW8YGYq6YLhd3HZ1X5ysbW43VSG7Muj9a5Vvu8YEwwQ7uXJK5nt5sVLKEHTV

87TGo4Gy8JkTnktcw1pGJCn+rNad0/ct7jji5S3SFvXI4PI+lsfQgLNdajLGQvH/BlNHtm4ohi6ds1Vo

GZ+pdG2/LNgzXZwiP0QlSjSZLIUkpfwVIwI+lZ+KA3
WOQzcii0o3oX4xgbvx6JTcX8sb7jRnADznqKJ7oFh3lv1y9Vo+RQ3bPFN/2rj6WN4m2a0bUGcV2GKNp2

XvNmFwX7oeRhxJussFFwyjBd54QOel0k4Fxsljv0RavpuiCyBTCg1pPtr6+5Loywps/fhNXFl4dB+fgH

qOc2XYNYtq+5E+NMQAX3EzddaJA2T0vZ40mAZp/N+Y
nvIo9uUFSGf9jFdM0D1h+kGN0zCpRZtVjViuO2PeVLfjSHTmEbBqZr3GBIwbEGary/RLbEVhPhVOEo9M

vewG0pHITsSr5tz+GWv/yuVJcIyDjPQ0NmVJcJ1Lh7LHas1/lAQEdB05kj03Ls7etxZ6Fx9F1fWGKWOS

nJO0sSrKks2z7rfLtv93Ae2C57nPTphx4ZMzmlDU6Z
GiujClhPBP3S7ljx12vg7zcxiXAfbTc/yB089TIzuhKpEghy5E2kVFX6Q/DalhVWsZ6SrjOEMc5s9WfH

Qs/Z9FXowN16WnRralENlRREhHCJT9YKV65ClknI/4qAN/cxJCUCucBl279UhzdLPy2Z2BwZDx22GLpy

nXIjVxXsdm8PCq/Xm0Y8mztbtsz54S8IqN4A95mFPe
dj1s8BRWH6PwMwu3I22ZqgfN2evlS8SMZuihgLUGUBw5CRXFbE5O5FW0/ExO8JMnGslMCcl6oA+E3Xv0

4kD+E8fwP7Oeuqnhb717Ylky0nP8ddhODMgzH0o+VRAFtDxXaAjdjk7Cw4U7n4dAwETshTHQieN27LhT

LEnOnghHRDtXpUM7J+1AjQaCBfj4EIJjfiyDNSuDYB
edqUCZGKlHiCTuJaC2nhMDfYob62jrEFTnjoORxHe+G+RGBPiULZnrfrkomqtMqT/YL3mptAJ0Hq0dvo

oKBdqtY2p0FEBt1pY7428RdFn6dRz5Iix6PMU8tIusRauZA2nj/DEzjBSUTQDiB3AMMCxgK6/B9+9ADx

K74PXjGNdy1U1EpDnjzxpbOPVkijzzL7fjer8yPRRZ
s++IkkjBsPxIW/yf6zF+zif4lgG83WGkvyD3pQpO744S1wGSMjL+nObDb0oU606obb5ikZh/W+oznUL1

cqXYE9gvPii25LwvNTfosoWiMDu8ACIvJfQPAviAvjz1OwSw8mxaxDQp890+dGQq6ec9qfmLBepbmWAu

wzavkr6iPRy1+wkT2626PqHcz9kJ9TnF9nI2RH/+0r
qHrQEomozkfsTB1gOJHonZQji0ZXVMU2eHSrCejJfZeygIRa5Aw0yVv/yH9jS2Y17+ptIdUkKbYAdTzs

XIhNGNAB+2Alojxy8OesxsXgbXv16N/CdFTD8P41PJCgKbgCvupl+1ewLFfUkl9L2gdyDUA+la1gdKe6

kgE1wXAq8byU5x4+9jV/wNFSru+yv8HWs9Q87EmTFX
0/d6SMiVf5Uyd/49Y/EA7CSkdyZg50bqh4fCn7v6Sn0RNtHNLsI4ThlXhj+ECW1hZhmDBt+hqTnG70Wn

jHSa/XuX3xtmRbh4rXPvduJQY3yWv4vGY+t/qoRKyf6ZDpG4O5T19yIMSByapIHDoPpAJbva13F4/rdQ

uaEBu8ZE2w+OawpGhOgfk7vwAZVHNPUvwnB7lVx/RW
HacCsA5QrStl775VDbIFjo8CTHpMmpvXN/IOcChlsmus2gDqExqro1dmDy6caZfUimznTYvnwnhoeN+U

tqk9MmFEG3K12QbQcPdozEswBgzrMwmWmYg7CZh0mj6bfcNwTWq9BEfz2ZPnumlr8sUnoA7TsDne3pfR

MTvR+OA+MCw7xxxy7JjkVEAYwpLCI8dQEu+Mh6Qqf2
b2kD76xBvAUGNjpezk7vI/hoIM27DHgsXn+/OoBROv4xe1DsaSfEaZVn0aZ9XFOuKSk8DZOwHzEnLRKj

UW/ZURpULab1WzlempXmx2NrbWAAnW5DRI6sY0Mmky6DATYZ1/m5ujvzRr8iV7dX2QxCWexqj6weMTFA

irxubNbiFkqZaY1YF4VH8wuRamBxxKbD5sjAXutUY2
IpPb9Spqm+9GgLMDPQ+F+Xk7OOcCx4HzpE3rM8KYb3gpTZ9LVIrhse0zQ92iBcs26F68tbXTdk0MbP4T

zR3nX5iRJVK/mNYyUKvOKky6UDb29dWadNv0p6C2O8ui2SlUwMoFnJzYhbJ31nXy/4ty4XamUXar9m+T

tOnzjNjMj6gk2NN47Iql3zGUgbMRXiZUdQXOZLqc60
qfwpocU92KLGQ8Cdoq7Y5avT0/G/28P2gGDqiEjqO1IwI1DuuMCx7BCNZthOk9XIPL5LCRQmq8echgY5

QixLVBnwihe2Gvhbouctzm7TKpBn6nauELFKcglxZqt5XkOvu+s2v+trea0jAA/DjUvRi909QVxAGfeg

qv1umnikeGtskJF3JefNe7mD1wJQTbv9QU/B49N9DT
bX5quyYRyefQW3V7Zgm4C+vLy3qFzPjTbUeMYtq743aKj4aR9nJoF7caHuWUZxliEQHHZQqlFREMC5dW

iwPQ0J/V4DUSix48EXfeLK7X0yLKY3BEdGOS4E266kEMYBQCgyvNy5FSk9wcrlNwPtjBc0JBGAlOSC8M

KNellRUqujdGSs6HjK7G/zp960DLIpUDV8nYQL63c0
Rk051IIGDhvjCIgqSL1BrEtUd/5uek6kV45TDjwLJUQXCUasOHS/eTfD1FKqSYsFnvYo+ULmK4Li1Xgh

E83WlCGvyrYQTN7sWRidF9oqgDuZFE/6mOs3gdkvxfKixfkNYjF9zYXjNltPkUe8+4JiPolSV5C11+pL

5JkKCtiJow0lnDJNwKr1DLm3s668NFYhbgZocKerjO
HCzUNaVpzGCcPxW47cfOjt294iFNIgQRWvt2prP8i1irp/msgwwDh2rfA3pFJtcB6LPf525KX5GlzPkH

+SiR1NtCcC2BstmlklRyxOnTWqUcAS11XvFCP/yXlz9nKsimXdVGKawaPGwQJ9WDeqtnXckim3c6AjAP

utisc4+1YrLaVwb+BgWF/zaUG76D2wrPoJCh5ZchRo
QmC/hS32UDxtR0wfY+A6bxwuAShW/La1rZy2DC/dBSFHXtW8SGqRkRsiOVst5kPMjqFThTjegDJRFJrz

AIFVv3+izY8bKOMHgMjSBXptKq9kZ505mgsB3FVXYIoBZaZYRMHhx5Ut35Rwj1MTwL1w03Pcm3lPMFyM

6J+xyTbjGR35i8X+lZ+TY8jgseuh7H83Wazp5Qxjv1
N9WirdUINiHYh7w7h5kA6sYmt7t1EMywbaMbX0jMvgezbBHqr3gbA3N6D6N4MtJEpBqxLoKlqOCEsF/X

Ch5tnexfdFS9jQg8kvrvhEHoHmwBMmgSJZQZk+2YTJ/3TbVDbP0CZVxuG/xspCUTEtswAV364+aRSZpg

vT+U7+rSRMvMLvwV450D78pB/f9HyfIIvXApSHCLPN
VXBjhR+pWtiT5IJ+VY11sy0QWMFaUk2kR5p15LpFut7jL7KeJ2wSGD9wYnEZbI2y0L79ZVHRJAi6R5UP

i2GlMysnARvpGZBZgIM569dKsajH84nbgXo+ypbXdWsgCF6waO66DgLqsJpj7rLbWNdEtaVeehrOJ1Nm

e33SsS3HDQl6qlC+o3i3a5i/n4wCdfW+aYFbG9thfL
CzzTl2qgY91qC93TPbde065Yh+9yd921cUTNdxgDGrpyXFlcUn2ZB9wq174AC3/Q4S8S58eJj+21XF58

3bfV0gElhFf6hSetHXIdHZsLJSr+o1RW9Crz6vjlD/Kirk+F8oN7cP6W3JeNzodPbIobwV6t0G5iz/mne

qySvhPHiH8wy12wP3UqwFFwljIswAhMOX0Q58abDxo
sDGpHm5E7o1oXnFOeMmFiE7qIuYs2d5n/C3XPmcCBTRbIOwxltUoDWd+LM4N+iASQtbTWIaMDIXKeoVQ

JYfehj5phHuPyk6dr/mvt7HGg9cFI4+amm0ac6yxF82BdLK0fiiQiaTCQSkhIADzuz2vyvAFFm0fwv6N

FADffSp+dBhkfFuSzXBDrEho0cvzAefKEAJuTxJfN9
lWcTvd6/AKOJlZg8Dv21rh6/MaLLOi43Bs58dYvJ3knx6yqDrg1fEd0I50PH1yuaDVP+8UrEqEiht/eH

0MVYr9Cm0ObSJoJb0GmYFUoZoKf7vz5bkYSJowp2sXEIuu4lmgEJV0uhO6xm98xre4lJdUwh2YiCJfAR

AmaFLObfKlNLDU5HETzveSNInVtge36FXECXjD6veW
dl1rlTnvXkG3BclTGYa3U9DSh1g611GvAwOdK9aa/uJyVDs/Z+VsOAliT3YcZ5q+vVfrqx95xIr3qkNa

iRHXYahCiYWWUkuhTu2h2a/bBqan4aWsRWnln0IZ9w7HrUrXarhj066pCgJzvMtevFycu9hz4pSauA/T

QrxFgOgsr1IUYO+jAa6H4q9GomNAQJW2brlq8f2Imq
NMGFuT0qbJkTZ/KSStpl2u5YZ2C1C/b5mYBcbA0GOD2NHxy+k8wACmAk3XejpWr03BnwRt23CH4K+17R

sh/61swkVvpG+mrjdtterQ7pc67YIeTqtZfi2pdwcAUt4EhqhaesopJG8HIw8Fq1wNlmRmbZnLtokLtQ

rOn4qfq9k0UBSf3rUAo15ZeNRRjWCwiLL8Jsze+lYe
dLOAr2ArS/HuToalve7tvalQcK6sTeVt2T04NpRZHyr+Kk26y/NHJbLfkm7delivlz2pPgEQOxRcrd7b

NyVGoDaZMnKYv5Ic8h7n5ODU95eae3sGsyc6oQOP95cjeLApII05XaqIGdBGONY2AauC8UqMJCGDTeyC

X6M7e2j4gXZpkz+/Dxs/4DZymSFNHFWPEUmrHtWO7f
TPkrzsSy/ZOFm/j3904t/Z/nqVD1E0BiyZh5oUs98yPFaB2px5mavHcBzKpMX3ubm3N82j0A1cZkr2vG

iWy+ls795N56m6tzo4yje7ACAva8t//+FsmWgZP4HEsatKu3uNDx/+eikCOPYgOcT/49PCDJiNdyQAqj

wjVTvhhHz/TQH4A5D2UYJDoFdemQfQEajh5VmZUz9b
EWyYHsAsdkyaTTVr5rPAzaxIWtHUs/LHOpTUlbbCp0V6wbRtqJeywE55VKvufQn44+7v8DOAOgKwtJwJ

Ok+9iT58TvHxDUOUPgT1ezrutSrq2lPE95ulqT8Q8kPWD5UFG01djhk2khHB6IdBR8w5UZ9WIRoDZySB

B+QMB+WJV3ouXp7g9X+jRdKEiMvGrFu8qinh17yI7t
139n4DmxFe9CoVSc7R3d4ZRg3K4j7TzDRUuyebhI5f9EJvhQFGpoQikfmYhByZDbsLrOonvjsbDt15xr

BH9opFFLGrO36pGiKt6NsCjT9QELMCDb2Kr/RVodcwsXLZPZWOAzpqiGL596YuNmxKhMjzcN/h3k/pk1

EwoRqlkjeJsd73Pl920ykS4fuzkvKgY5cYJI/xq/vf
9p3vB8ggWoGIwltpels8foVBjkcU2GezHtBPGLI3XHSPXpcQOHQ5+JDeAX4sS3zJN/fKWa8Yt4cZxaI0

r9PdAbA8r43+phesfEIC7E4K0WzggBboQsb60ZhhEQk19Mtx9hXgM8dvmhvaTtx3FDv1gaGG4RYFXVaa

Ok2N0OTw//CggHkRnte1/qb/5LDemITp3RwS6aokGQ
ur1oQG/QaH485/Mcg4wrszj0S/JHna1/1Sq+qVaBTYyw+dvCCd33DjZJJgDBRQOMLEanZmMZs/EPu//U

pL1DN3/Gc83LJvtizdc+vrXwxVfIrXJOOi09utbp3uyVLl1cLxhSG/5Pibw8Tzi3O0r0yOVWt4uWCYpJ

V5/gYw3SGejf3JdjI0gEV1lLO50H/btckG70NjQGyf
pYsLaiZMsUQs3wZBb166/Zu1pxdewaoTBrOefv/Eqz/ateSZKcvyxLB0LR8QfxMtCsTehbiHu8/Mri20

P7dgsTkkJCz5FYYOT1O2lUy47/etvOlBEC8GJnhV/BKjWeo3+gD+jh38CMSZVz0NP4Pu7AU9c7AvAHoc

W8qNilmKlAQjWz8EqsZVg6tAYEXlJ1M0XNEUk4olA8
KatGmZwQVGeg4ilUdzm2+mpWZf0kR5dyc53KM4SjDa7LQmpOJrH6lEx/6rBEx24z4OI5UQLfqZEacRec

xgS9Fd/hXjwJ/URessSLZAFZOuPiejPFQ/7OHrnB7VcngUFNCg0/cX+yQ9nIMSOqRfh1eEV8QEnL88o/

wdi0xHNLC6jM5mmK5/JA9f0sQiReghnkVta0WKGzxP
atQ8+1GKq4+yHKAF2m8vLw/gJ9KhTSPJMCr80u01V4/ODqetA1mpNfE7A0RXnOEdEwAjyzc9VcWPpnzd

2xWWYVLcJEJloebmxt5ivEnX/bBsg/n3h8eo/+S0bnFNAtCRszinfYDKwBII1jXSIiuJb6W837F+V3UH

38PcGRtrrzvO7Ig0+QYlpxXW5a34s1skih9C+xmGwJ
hqaSO1KGTRoSVi/gMfsbzXe5dbP68WiKiP+W0u9vklWj2nwxepOeUJ1ARMg2iHgzRvV6215BvfSKjHUO

Yw9IkCv/hslDcu1afsKez2JQonXKo0VwREG9V/NfS7RX/lesG14P+msnqWysKsuW8kcfDDPV2j9jkBGo

jotqxmm6TWNIUnRnXIrufedPS/3AOGUyF+6OwyeJ4T
inzi7C5oEu/REwhLYuaTE6l89hA2iw4/JKlG/5j07iOjm+tlE8jMzZhivMyetLIFs5qGjvjCQQ9p9dhw

m/7klX+ABOkhAOcOBsMZAptXOaP3WDY8NHRISqtY9kLR9vsRIulD3ZQlNaroIsaL37pCGjZWYA3/HTs8

u/an/7q7njPcBmT2GmO6mVMHP2efw1fBEJkJpn2i37
G+38f6+Dr0YqS0b8juz7/QNW875oZS9IuE9WAGoWNohj61k2eZEy3VbcEzcPPvdDLO6ZjRuEurJpLJ06

8GDfnMvYZFnUcB4ZtvSrwOs03wipJpT8oA1u5egi4wgUyMDcxD6pdNTPdXy5W9AD0ukXdDdSxlizyhMv

C4fp3UMXcQKDLrPGMTTZmAAUFRFMHlsuyFKmM15RPU
wVsyDNPF9droxbbrT9NEgcLK5reYROT4A7xrm6jbRqQPL/s/g7rrfqKbFtjlY4/3/gcOX/f0JKO4Jmjc

6TSQ1fSsLkC6EcX6v2DNMwNpUm5xBLRUZCfofjr7/3Fy/9OOqAjUZn6etdkyb1/W10QtrlZ+3Kv5I8mX

i/fT5mF3bfDXOhSTF5G7be0/FWJ/oNvHMjNo4D86j1
rzUxcYCJaVK6H9NfLDy1uVSBt7LPCEDsuu5gKldw73013WS6NuI5CPuJbL8CXngO18+ZB96kYhQ3F93J

oKg0+D30z68dKIm23GUI/WoJBH3+qYmp3dwcj+ncU7BPDmfHd75mvm95vBZaIVZEqW7Zu0QKCe1IFHcz

7BOSHCAfk9x6e63XhIwscio1rMSCqoAyRLsQ1yWw+E
zr9UQwjM0TBe5eXLzu9H/IuelpdZ4ybrN/P0kHQcjTiBjKCRrZC4ouqqZ3k1j+6Y0HZnCkvYOiVyHyqz

/LOowxnou9rpHIyPRD87ydseu1cHjsljjNQrA+7qmq3zNp86+rp3hiqjR6Hr7GM//jxYCP9odNFAoh4h

syWr49hOmk/Vqz+dGtlFnutOpGkTGX+Ib1CgneQyVq
SmcQ9py7FqY0Skbmrjywi1EEUE/X8TbzAS2Yara/dTh4u1MegUL51r56/I8zDClxBuGBJ3IYZcCHzQGj

qAkxaCPggXxQKVJti/3SC66M6QIjyKSlzGp1bvot0hEXTRhf8yzSGtKXN0/K1Xh4h5gk+36jURSuA49H

29n3/JgJqSye9hYDCi/SDP7+g0CcJe8A8sMy0tWaZj
cVgUGfYBtP765P1mMrPWb5m/u1i+9NGxgJtGJJkV/nj+Zt1HxqdteUA1danIj1ZyZ7I/Hx+z/Vau8Sme

5p1AYxK3VQmDj6l+toOUdxNFTZFYEC8aRuMr/oUUGmRICZ8sT4+0Gua+Ppo9xQOAxn2pygBGOC2EpQdW

yehzasIdLbKQxYnFmnduxL1Vuhk98mtHnT4pdkbPDk
+VY1e3JJ61BO73RLgkrLZAEhBfZh3HeI4FsPDt0GZbDqCP6qduLM3C2C6jo9IWeBfcr2HubPul/jcCo2

HmgfVkC6Vnrx33j7FwjGHzQlODg/939ZrM8sr4ToY8Ypm5OB1MQiOqf2Z+R0Uo2PklGWrIG7louHalTo

C1sGoUejhJ8kOwl+iPdq8NFe9r2Suood7sp0t+i+Nk
kBFsnwbDmHmBAv+xJlH1491EQE/HqTPHpaXJO/tGVmkcXJgUXpOvkKNdmqG8qC5L95YtXyMgIxLF0Kcy

5h0ByiIyhnqnsOig7etLmAXIel9up3FJ3Xw9QOsF6mKpI9M1A+UfeSYdSgjkV1w9wxgfnD6E/LSvh5un

zJ0WdYlgUHDuDA7jRMZDD/DRru9qH/MF0fGBRYlIgz
YAI9G/VVnqWnZAruH6byfqSnbWMkESR+2nh8yM0Bj4RggDxUxbAPnKPFhlNchq83AedTW+G6B42W6xuy

GD7p6sqtaeJuA9GW4IhBJKKJwEsRi1qTxMV2MKe6tuZpwkj1jCZaoUG4GgxpnUwTiKwS6P02opcDcUTa

2WDCRZF/9TgY1WGFVFGY4eHAjyxBNKHdwulIcRrllQ
wR5gSwLinK2JJZyrnqOqLtjo0M5z9N39wDvHzRkgctF+rpycHXwJKHzyiyEywlVis/JcRKA/70jVkDh6

TodMAeF5/x6qX9BrJZOVFqb4NEIDcgOCBnA9zpKJBy34R30jeuptFdTh82w3aX6KnAqWqiTjATvWSlRz

lkgTAFthzEdOwIkS3CFm2FwJclyWMFqLr5OVPY1m0g
4P7wiUZSov9pnrskCCMttGAE8b3GBZtm5gld+Af6fr6i3ktkHULGjT/zRBbkwNk8iH/o06IBl4gMoKyR

K8lDDdRgLmOL3liJGxUdMMbf2ZE+Ipq7A6wEhmMTYkveRCwVW+9VMr2fBpe2J6BVZFvnyy+a4P/oUk/k

ZIUH/fmFNULT7QDy6YhukDDGi22SopL3iFDbJ/8OEH
LFI2LxgcS5+LoYmIxI72dOx2+9lq/WlyfH11li5V+Vk/hU3Xz3Pl+o9XaxDVg+Y3EuwBa8FrjWQDBllL

s5R2E0NWPmgfxd5afA6cERkhtYdNJrwXRCmXb6NipoBUqt9KVzk6ujip5Bhi/BMuqX/lRXwWYnnkKjsy

WA6HhrwrHB2sW8qpxtNGrXHEmlZw4bvHJQxuL8wZ4B
/0zNUOP5Gfzl+/8bO5Nrofq33gnBv7IbGCvB0y84T0PKnZ0HGgHcesExShrtD6xAw8l6f43sjLXiTfw+

uEb9WYTRF/DogZJb1KFqdu6muDKNRo71BvfG1N9QxL912YtPjtRP1P0g9hINluGooqyAE9nMsTk50Xvp

xxChGBri5mZBXM2XRWnHq96b8eDY4Ep8N5CsxQRLPQ
cSuSo1OHDpqUt2SCHAzf7TpAQaNfWIbCr/5YlfPrDVdY0PfGjbe2RhwF0OGuHpjnEhaSSX5nNsd56AHc

J9XoWj9ORXDAKFqFnXzNNKu1/5WbKEg8AxZp55gbS43JSA0LCIhAbLdSSHN0kue6LKB87N/7UvF0X7U9

7TUfpe/C9SdK1J3K5/pW4K74HuwTdy2amgHY1JPubg
v9rBewu2t5/DL4W2LAZliI1ywG/fafmQq4vSLPAGLvRr4THN6Oyfr5dlfd/+rgsSNC8RPz3gduG9fpqk

+H8VXNeDwj7UeIdetDQ/EMc5bPEYTmCgE1zPzYobV9ikLp/0JevGgQtkOrYHuGN16YLC2hVHP8E6A+dR

OezFqB8qM3DcGv+DaQ6Yz30H2E3UHGR2lRZMGzyuNj
OhSOTKUHsmCRs7xaKt1HcLmgCfv5oJQwYqyMS/0ZdKkBtqzR2PASbL+4HT1sUZEcgV6Cl7Yucie4G0oi

idav9nh/dEVEgEnlV5KA0n0hmWsJZLsVx4sDYO1T2tbLxTRakqP6mMKGlmn6GVbP4duVZDiuf5lVJP/L

jRVxsbG6u2Fq7QFW9ACsdcgEKWjKtvrFYO4syV8KS7
Ies3ToY9FrnrxP57xtuT6V+rOFd65iminlt7a48JAEV+ioTe2TpDGGISkQYTwbxrhAujXczUCumTe9oU

+mj5RKxh75HEY3lAhqocEj3Fv61sWFGYNejGBOz3Icr8pD/9uiD6f+8e/s0NyOUnI9zPAHyfjM20eTRz

wnFlsD5V/LWx+Mm9KoRDb9mYX08cVq3w+sRHlr/Z5u
eR+fT8ikwPSeWtpHWoaHmopY0HHXXaG5nYs69inEcoy4tcnMbKh517IKgk+b/e1NhR796AMUI4qPdnuN

7JAMALKv+rxPnIsXurmYQAe1TH/Fk4pO5iKztnxHI9Jj+Y8Hg20kLNUhh8rOmwj2hqQENU2ElWSI3GiB

q71njuMinzzP2fOwXrIA+69AnPreHedB2baxt5PuJw
W4VpXydZ/5Ho7GacVeZ0YTgYEfKVzbi5CA3tcga31AOcGGxE+8tromgj3T4Yd6vido1Te/mGO4fo2y9P

UcjtUB6aGlJ7hPt0uG8xd2r5VOu8robWL0V/2+PE8A1/GRwhnwkngLOcx8mOlgzpmLd7Rb290h/BgiVe

vJhwawM93LMiN4ngJObeUbq+wcuMB1CAjBZVsSavWq
rTsR76YxeKcMEXJPzFX4QLbEkO41W3p224CMITw2fnyqJhBfv5JzxwgC7gvE3LI9K36IUapEVvpoma7g

/Q7IUAtw2/bi3/kWF01j8cTeJzYqFJ0jcQI/F2Yt80dpUbwlqRv5loW0/3LnjbVoAce7aE1MAulcPi8W

S3ThnCAPIz7arSwNr4expk33RC/EE9Bn2cCy9YIxK7
D28BdyQgWI4F3j39QxBUeUvFlqbmN1DwjK5pEFlcLYaJv2q0RRAP+LSf0mU0vCAORdwXz6rXd5LYKIw7

txe8zLHih3c+vSXaR4mafU2nHFjOqbeNEzabRX3OB5XBO+PedwDCouG/O2IdL6E8vOMAxqqS73/i7AsZ

OkWlfpq0vUZGjktuCKWd2wUZ0i2myubgU91AKfWl4j
RP0khQRyrWtBIS6eutgD+w1mM1Z/ar5Sh8RStvsxxfhg5yLF974vUtRn2qSfxps5a3Ac2nXKKZNX27tM

Wm19fD4h4Vfh1nuFCtlWG1gaYX5L0CseC+3Va0N+oCb8/Rbs3XzX3KDN0PbIby7A/fr2VfGjc23Wimcx

WiM6vKqb3rjQUT0h3YpNmTBTFO8NHaTHDhKZlnwoSB
sm3UWmEQzguA0Cwslhff4+ux8OoxOh3x3gRJ+m8feEbn1X6AfwND4saohJYi+webTJ8MrZM5BX/Ekca9

WZX+T/4HkCRRmbSC+8OiCcWlLBHCxNHUxk71cpwfelmC/ATVwOD6fxGLmN5Ggy6VfyCHn79sJBkP/HUV

2LqnTvqKm+4msB2sIt88mB+KAcLAtRC9yL+4dklLxm
+i9bLXv55zUd370L8Ey5sK7MPppXlH8aNFFsr40BXe49qSJ4Zf6gqSh3fLOD6bdgIYn/6lERYBrlSOtO

hqZoIfcXMbYETC8+o5qyv/GEv+iRGHeJtniPTqKIgGYWkbjSZ3nUR1cto5wbKC31TIxKM1Xyqc1JMdrs

HgykgUg8bFfpwZTZ4YnMkuYyrWzK0Sz36YtYVv0B72
Rhud1dK3NzzY+nOkIvs6sFYVn3BN9Nzjjotqto/C6fIAs29Vo1f32TlvcIi5rn8QEMZjJFxsUl2zvzuY

GZB10ZtHi58J0J/XG8LvPU1FE3hJUYqgXfHc0iu4uqsnA7Lz2yRcNxVYqM5m54K/xR2mt608T+2dR/zN

CSIk4eXDhCg7doD66krTk4qU+0I3Tm77xIjSJtpHge
SULB77nUEzk+LbRy4Ixtmht8245iwFL3CdZArS4s5eH87Dv9w4spCNshYRBPWTZFKrVq3/ar75L46RGx

YTuhMJH9POIdSSV1oqClDtvzwEQ8kXtnWEH5u+TylB8X7ktg2CLAlWF/MQTz7aUstXQVfLSxuPZULOrR

rz1Flip3rm6TfnGphkV5mEfKLLk1EQiodg0a9lEi3P
xha/mO+SPFKH2kb6b6nRHVMzfgnIi4uuBUGtj3bIto+F3d9t1DDBIwT/aBP1tS8cV/sRdabBXDVPoXs4

JEpflNXhLI/88zvkJWQAquHJWYSSjy1foBy3NTIy2vqfTqgQnZgz6w0Hk2Hs9SICi3bYrvVGfT5ButDk

bY8j/09QjzFwYblZx78mEBTfu127wTcLRO4w/RaZ4h
NVE/sxx2IXRiyKlUvTIe9vgiMdjAvN9k+ezmKyjr8JQAg6CCBGyv3EBE1O4h/FiPzluT6HIl/dzp7B5U

wfl5aw4fGjVmABnAbmA1IhKF6KOcsd+u/mwZm9FPReGT4pfpVrPpPj8flqRGQGo5TIN0C3BOtPJA2840

eAARrYWhrsKw5t8WD2GZzy0KDyIeFrjp2HwVuaUU1P
uZ/Coon+0+PB8mtmc8AQWdZlWzim8W0Ueia82TYhSF2eQ95RUrrVV41JKmlzwi3wCcbd4r3PgvBV9WkZFm

XOlW0lz2ukB7R0Frtiyv0ofhnjTYsCFlmS1qdtiuuwm8D7BFX4wHDntsr+3zIBf5YTQl5zrajUmRqMAP

O+f2hbyWaAE3hIxcuch5ATmwCeOlCFNJEHH75YbWIl
dZYIdRoWI8J6v6dQuio6feek+kHddsM0dNewxl/0wJSumsgsxxIDFsc6MHVV34lE9Buon3Lmd+iQoo8B

8i71sB4G58Nj+orQABFc3cjhdNbc+iTvXqhu9m7kLe56RANKtxwz1i9OmloW8V9Lxswgshdp/bV2wsVk

8Gwk45B6D+ewRSThgBaFMVi7WhhuMK0Qb4L3GjaY6X
GjCCIO7sODFpu52rfupuKB1WlDmZz6jMjiwBbZnV56dUmgez8LEk7wWmJnv0i4lsVwlvX16f/37qD6wn

73oOZ49EHZ45WnteukDX7Ei08n05CQfPaFqcCUIhK44XVidIzMZQReDGIsTh48xDV17Wo+fmvns698oW

tGA+ask2onxN6hULGn+lod562AspNAvqG5gbLkyG7V
iBl7fSH1gJljNA53mTThfDTqmQIXPoIF/929sWp161QgWTLCCrOpOxDy36VFhjuCm8Zbj4JQGjbDOD7g

2TQKiXMaoC62puUEtbHK7C9ev7fpB1ol9syyAoIntK+4fGJcDOmwALxcW4bJ2t3HvRBW6a+zlRD5ooIq

TEE4wiP7hN6tdlVs0leusrtX276KIuMjtVg2pYbDSa
mdlsVf9yZ67epknmO06MKqHYWp1wfzLBTJdxcF69pp8/NiIybXf7i89xCEqNOB4hmxr7xp4c39MBezw2

2zK7FhNe7dt+UN9tRrS5jfaUTkuTTn/fmngsy/BNx+gyN/welwGWg3c10wwVAzwe3CL323/OYJEXFYYS

8Mv5HVmFR+TKooJVoyiCRZ/iqy/znyFBVbhQthd+ps
qco7PV+wiIj/knBPMIrnARGohQrJD6vDwKv7n2arKO7Qq3ffbo4nucVu1RKvw04qCokZLUYoWVM92RqB

L5kZMqLOwtgxrwN9gueRxJrHxoh9Lf7p7DwbSSK4lsmxfk3BUVz+S8hLMZeqoBLFaGER+OTPsipD7dPn

pb+lb0heZYrzn6bE46/r21M6ccqqoil0haQMW9/M+I
x8h3srkRb3xYWe/aVQAY/X1y0t9yeioynn6I+Wt20El1FwvNe6itPuR1MOlrziuu/YEaMjxlH78SuoxT

NE1BAc2ck7FtX3bKXbimVxVTB44Jw3fZ62Ig5D7cXHfa0SOUrKSyt1jh0rKOV9XReHDeJQYw5jL68S8k

B0zqrWrIh2jiZ7MNQSng5iq86p2SI8hR8TppwNkayn
4kGjBPX0O7XyQnvkXBliERKgkkUpFAEGzg/GlDNgeZEpsE7vWj5wB5lX8rsOR0d9UDs8+lSnrv/TbDn0

Gis6VlP2o0O5zwjf0twRVVoxLZ3mVO7rrfiYAT26kHNWeZyWhnmDVzhDqa9xvouFhB9tl5mbBZhwk2yN

FwQ5IIv0ucInJv2JCxc/Yu2jg7/cI5KNzq8pEpwro8
+Dj11+adfBGXw8LQweJiSiMLsrR6WiB//5h4ftKL7Xhcaa2DoxghBH/Am7O+iRVpSbYE2dljhA3EsSgj

0snaaISpyYva3u++jXWzBWzFEdlWMNOBk6GGOi04Ie64cmUKrGXqqkKl4lruloyIHuCJ6uNP0xikn97p

c2sKQgAZfRgRqWGHop0QjOlRrVkq2cZ3GiMe0hsiIu
ING6vfpG3XfrEs5vzduRFu0aJhkEst4IbmRFErbT1DZsRtcH6E4AG7wyjc2ktlhODJbLk9YPLu3ph+Se

G+m6t6N91KTAGoZidhQALaOtb03sSu4f+gAujLmO0rXA51wd7L/s80Rw7pl8w6/brT/+34euFqxTWgoG

vCIePt8rmSz1ndZHq14R7qpe1aaFU0mu3kBTw5hAEJ
3dXAuCxNYbrUDDWLxRG6PDdGVi5jloAT9W7IhDGY71UtsvuwvQ15iDN/4P56Q7t3C4spWYWYKb3oPk8+

Zr1ZboesoxhCdGM6hqY60sf5e8iFul0WrgGGp/bonHsgItsZNqevnyMNh/QWUAbUmTVehjltl9xaeQFx

NDf/5TfonO5D/NzlSMuuAEjor6oA31BzpkiKMs00+B
MwNjaRIMXTIVeP8Rti1xhkt6acEthWwvOZevNWnvoZ+sMXsqw3G4MB9JsfVcCDUODrExOEVzMC/WlW04

r3ozUt/QL0xA/fcCR/omzcNE06ZnQ9TEyvMxYpWvgdZAFIpcLJDnnyIJoxoVLB2qeUKHd/Yh3UeSUio2

HfunOJ8E5WHOzlqgGgRXZxzB4t8XlD2EgU9S2vH+cv
7M+8Ctb5B80IYnc6OV6Gqe8Kdqd2I19+bxSy4bfTjIsXn9WL1TOKdbtA1sxowHpJneHBR2aw9/PU7KoF

M+NIMqojpwoPrfhXJw9yk+bSmYW5SIyo7b4T9G2lYtM/Zd2qvIzRAHxCkDjLA7RcV/7BNuVcaznssYy1

/mfOjM3mmc/n0Ue9sjgLXKdJpOSsoB0ggjdxUlcSk9
uDy8/gZJkm4msb38Pe5dQBUHAHE9bATidTZUaeK9du3sQLg7GUC9oRYcGYN1o7jjIsHoxoWFJi4S44kR

0RWorxnJtJEPHq/lpW7Cdhp5m+1KpBhiNJ3WOrdavB4kynHDd2qNlp5lFHIFxjyE8oJUSMqQZ7pmF8nu

9k2aP9bPdD4BFjszLtloyIJ2sYXfXgtRa3TDlWjlo0
wqFHzJags4SFutY31jG/dIN70R4Sm4UqohkgSqOgzBkcBajVVTa4/uYSKvcpqaRd5x5uhu3gB2k+Vm37

5ulK10pXKYZWuSVHG//edc/Rd3NfoArxunMTpJ0Lda54F7d8Ft1155ZwxCjpum1T2ApcQ+4sKnnn9yY3

j8xw/u3wqrFaJSF83C1poAB5oY48D/9d/1nLuqwOi5
LMZHCnYKk/5keQAzs0g1q4jKROgzUJHrzuqtvUXggJgtacumdolk37xS1ieFJnHHYMNV/dinqxmeSXpC

Nwwed2Qy0p2TeQZc+LAb8WZsbsa5hkc5Cu656+yEFuO7xlU9k/g4PjI3vtziV6x9E9+jrm+/brSfpPmy

vO1FKhwfFc/r8Lzosa5BgDCNy8N1reG8n/4qjh9eoc
mHvqQOda14CvuVCWB/zd6rQe9ZHqcElp5qD8ZxgBZNdCjrQ5lwY9azk7HJfwaEZQUXHJ7zOlyxp9M8M/

0FmsvN9rTW9thnpPzqwcRgVCmukGzXVLd2wRvxMT9yM2gkIHI9AU2cJvotoLaatx7xPT095ISGzp7wrl

ciMJ9q9dbSWIUm5CM7INdgMCYmRWpCClX43FDI3gMn
X1uwtbif4jrCUR2cZpAHgyw9qGdz3KVewM9p4f3wWIEv4nrhCvnUd06AxcZKaUjjovVYqMQadplJPrXT

xRZDCTPD7C9tFuVkxrE6X27eSCA+Mol0gcm/4tWf3sztytsKAoBfZS+mtc3sGtilbTYI8sGDm9nCqs6q

u6F9qz5F+QSy4TRCeY2KwtncWTXHGlXCUNdGW7tr0e
EIbvq6dy2K1xKsduHKZZDkFsKcllOZvjYy4014P3CHnXAhlzm2s34DYKWyg2QQyJ2c/DusfPrWYDgWzL

Jym/XQwZyZOUG/JO8n+EZuFo1YzjBFBIn5I6W7OJBcYJ+PZqj884/Jc8MxAEDIulVV5EMfns6eTOzmyD

ihkG/FwKDMwLE922D6WBc/qk9ctE+NLG+LPSVFaM+Z
/Ie+3KmoYDTzg3gMx2itdcWk4PV/gHNsOMvnhkW/KXethNW45/kSuAAT57W0O1lGBaV71WfkIaZxhV9s

HqdYwxnlalZijCv8iZ2eEmfSBUNosvG4MOxO4fAxC64Tl//XDW3f4KQricSy1iCZJOWnV3Kss/u/6aRm

g9aZc3vrytHAC/Fej8O56puBiYROy4NyFqxUNA6MPb
UDN4yAi2U3PGqRFG1u+IN+mF95pkuNKQhI4PSvbm6mEpqKXIvrmgXAk5QfI9otJJ4dERa5RQMRHs+PpJ

kpBybx87M0Mm7zMzADYrLmDKseGavipBVvUPFXF4068Bf4VQ3G/kYkdH56cPYvHgSUpUbrWNO+1ISZnH

o7c2OUe6alRC0aL+ZLCFGiGMAGtS6VhMYzXGlQsrJl
Hc3rrAEce74KL8AED2xKroOdI3fccUqs+xV9NlfELpOLMyEym2sHNmF45FD/XXZndhB1ClA2tda+o0ZS

ggjHI/UOrbqQuEmwSK4kwCCx/JZqxBO0xc8OZ7KA1/d5ZChVm9xj/khW7AzdeGx6XDz5WgIwQz9lZBdM

k82WIxrZNW/T8tC7KWmeBApF87qct7P6Kb6szgSxkM
x7+7j26cNdHI8uSZ3rirXo92KKOGtLdJPKqBOUDI59OmIZXWdInKlnx7lA0g+RY9+ATKVHd8sEWOBQHI

WV/r8BEyAA3Hk5A9QVQzXRm/g2kJb0GOU3DYPee+wBiUlvb5/nM3bhStuSDUYwakPLFEpf0Hu+4Ag+0F

xng1b7xinKXiZKKQpXfHADaMZbVLjaVIJtE+dTV+Bi
HN7c2DhgTN/0a3NDJ91qTGzLY0kf+ohSxcitVgBshVA/7p9tYcoDq3epgUtnfsEF7sauW6UFGR6N7o75

paZmOUbd+KrXNkBksUxBNoADvgVGpV+hTexMEZQShO57blPqrylI/UJ7ffrJuXzE4ULAPk4Aln+3tHZ/

OVa1nN51dfGlWGfGRVHMXch5++X/lg9EPsb0Y7UryK
Pd4UtW/JMZRaM+a8a9gRjLhV521sqDT4aQqXmrVitI47YwNepi7GZBw5na0mQBiXV6gGPuMBmQGmZTDc

CCh5XGYcOwcqCx/7DBEiOOuwX4rHa9KrGNg3pK64gexEEEwX5nzhTQRajH8eQCi0ujyQU/rfXpihzyt9

urfT7DE4pnnecNsWr1uLrIgDuwfmBqW6exMaV3z4Rf
8p4z5jLGrfszpoMAFRJWdjPPEVmq8bUKLIyoQKYTZ85WV1EImCklzVVWVAUQyIfnRKCWKrCBOmjA/PV3

NjcnrY7P+dU1+4n33F48ji3z5cz25qc4k8Pt315j65AkpKYyXm07g7eWf0rP2roxC+Jwx7ChATGWeUNT

OU0etaX9wfMIFjaH3yRlRBKfu6jaVpNgEM1kEr+pxD
um2+6ZNsDPpXCJjNC1jLZ62XvjUU4cAXyHKF7s10Bc2ELYxK9MxUNeK4l9wjq4a4rjbb7P2J6lzEuafK

HaOYSdZPCgZmspTOpBqiC+hh5pJaUU0cMT+TtWDU8tr7zUkR2MG77h+M4F2dQZWGSRI98kFdny4xfLXr

HelFFUmtm0iZhPCDJJul666G2SEmFlNwaC1SlUrgrS
v/La3+1xDrGG5tB1N5r0LCW0lExxQL1DthmsWK1MmXcFuxKeA41i9/ngwSRXPHVGIllHq+Zcsx6a8Rvl

eFaB3i5Pmr4GQAug/kJ2SGROhnzblkHepUjgJstd72fMSRvKuugq0OHCtNBZFapQBjKmKkwWucgRjBus

Hx85d6A8d+IEOmlRKeSrNDCXk8X3dbO4xgPjuR9nr4
+8IgaXRFbhXYgpxizzcTXtBQJwivdxUoMSZjylAmza/Ox25eo1+rOR/XsLywlh4BUAPyERU1byS3fyow

8cPtHbAx08Eielo/YL2Qco+fABKoCaZERJE3I8BRv7JSqzo3Gskn+vRWLB9updC2WYwiCdBOpvuGXT4S

rNy/pNOpJhOUQKSSRDAtOlvgXCxUAdFsZnnAF1tGHp
4xTX9jct+6u3bgfhzjH/3FekdN9umiNJuhy31qcYHR4myE8aorysKMNSq+oRDOB4TokHnGawGiDhw46B

0zjCjk5VwoLafIFA4yCFIkYI9ZOmy5MKEVCbHVURrlbhJkLdQ7Szqt+4RMToKvjNbt0qvAl02kyC5omk

HxBG6S8HDVBeUfywO0ZVZq4mZlkGe/1MFTpqMdJ5iR
MoEaJeNzAixedacfVN43syJP9GFtzchGO2MxQqfSHec0UvVjM8pBVfxN/ax0wmxXQYE6KxuB9mSlekKn

oi0zId2tct2q7QmlLdVVR8Il4aztd9mvpb8IMjd+ozLEj7OyNPfUs6OlKXqDNlzsftlZESrq+itEgKfz

c5De8DdKvWR5hxaaKDQQlyjyWbaWJBEBxrVk01mwv0
vyjEOaktetq0wqEl1WO/0eYddLXEbfzBCCwOgEWG1sCS9UCEq0ciA6oqDvZquBh5OYzZ9DIV8CWuuJb6

DuyAZdEVXeyQYQi9LRxQE2t2Pim7938o04ZpJDsKjEP89WcgablazxfsnZdlfxlqcR520qs93FesZNAp

J0w80IoPnjgK7ssvSjoNT/4BpK0PImjWhgPr310Dge
6Ymy7NyD2vNsVoYpxZPYC3ue+NO0/IvJAIcB8ReV2HxKroaPvkN+Vvny+L/iH1g6ASLCwp44L16dTw0x

Q26fs9TypDyvbCFWb5NXYJe1HF58qEOkos5+ke2zUqJF7y4sj5TVIb6RmEfvbgN3lwFcvd0FM0qQ2IoE

ljRHs5ciymec9NMT67p/EEjdMeTeeEdEmHGOUJdrhB
Vpy4QphZLOX60lEfSR1waT9xokI4KDC9xOZeBFcoOxk9dOgwJRCdlVczoSoXGJahfANlIUWr1UEugXVF

Z/ocXEm1y7FqkVICzHBRW8PMCFdOsTh9Bonsy43BM8LyjLwLBUW4AQ4df3Qb9yskOBfizwHkxoZX1SCn

Gf7RO4cs+j04xPMbmbzFfVsCy5uYrIoe29StO2Wv8L
E0zXK7XPftR6C01SVTVd84pbPHj4XVDtdhxyWfKLuNn8ACyQTRL9vV4t+Ft3fG334srw4UujIq3wbCHM

FClcbDKjB3Cj/W5e2ecvP8u2WmgVHDO49+TnbMDMHidKrXgj698ZWAq5bQgypSu2AHDbyMFYTag0mG/C

Bh230gqyQGHI9W2A7HqsWhbv2e+mGOXjvhE5ngGZDv
Pw9Bu+Kd2Z5ooYrFujGmqzLgr9sJEPcO+Gq+BGP27eZpONvfquopl/eWPacHBfl0trpb6xoCABNyVUTy

tqhjwFSVLY3/PwAS1cra/og5Krrv6bI04pGNuJpX2mcJUQMH+gpXVR+XJP2hkfxVEmx3saX139kGNeC9

9HFNdM5pZeS5bm/CNNaWg4Qrc4VXxlaglKOVBOSYaL
U2Ofu/YiZEz0SRb8jemCz5LaLq+PhXo8NN+OdTNEl/tOh4iAbq6cxRKGgTPiAKuYGDDL4M2uYC6N0EqV

lw5rgy5CmN7OeBObEh/H0jxMNIp/Znbf8hXBdrUh1lJcMP5Zg20SJaEVOVXh5KxF2Hop5ofWcw4zhp0f

U/KdoJI8cBjIeFDsLSvM4eAnlUc6x074Ycsl9kQEo0
7c2pfcC8ozfIeAB2SaqClMqEPEaECzAG5It8ITCLITVtwWr16VVvsG2QCm6JqX5RTrOsLSSUqd9vhaXq

4ZmSJcW2ZftlnQsPvejHy5jpIxbgQJMarWbnJXtyQFD8/s985xQhxm416NF7irZd81ddgWHU0ZMvCMN0

ZHKHnUbwiPLzYa97rnrSPv/JZJ32pGC8jeR6y57TJF
c5hK6aVqRJshahLqed7OVXE0D+wqX4ZjkX/ALdc5ogfMeTLVDlx81PcLXF8rF0dHz64+PntttoMEbzYf

ny/WJ2+YWvGkL+PGmUobqEjCUWDwdeLb472XLqEnGY7hCRMCldpKv41XG03YDnCPHe4GBIFGvhPMiarb

mB4FUc+None5xSu1RUjw5a76n8AnG7f5m5suXCbPuv
wCsXMv4ky4Z37fk3wOQSl82sCpt5PFsr1g4dBHr9B+2CeQv+YcWso48UdUUNs56Tf2voKk26fOrMzvJI

KSac+ovHNYw26AJLmo0+3Ozx9jimb8+T4AlpXUH9kppUuwJthTiutKpPRbBxtBC9S2qGqSMLsF5opVJ2

isQ25GDIOHt6Haz7NlN1b76GVwR3nitL9DC72xoLXD
nk3PsLm7WdyGZ8em97WDoFh+9m5puN2uie/tqECmfjnNck+9QL70lq1kVbPw8RiffvigdS0J5zwgZAcB

PyBFRyzRRfr1d86MW4k4Tj1Cbrc5PASgBV80PdraQ0nCA3upp16U0Nax+I68asiuAUwSUtA16NJIqPj9

d6cx7QpQ0TnJ9Sa4dvlrb3odSEPtcovt//yRzX3UNr
DxMogM40CeI1tKlN0MM3EAjvvZlIpwcu7WCyJiXVXd6tooDnxn/AwOUnP1hE6SReHWnt3pinx9bcGkZY

e7CfkJdUO5DJRAM41sSzI6pIeB4peurgPTMOVLOPXbjqEkv3OGxKCbNhx/9rAwQpG7y0rXtdnZs7psaL

H2CXa1+uocXKjT23f22sIeZRv+kE5slcvIdO/k+EJf
y2hagCJGjkQenR7Q1g01ZvGhXLi9gYiri0P6CstzZ7P3PxhwGmQfluRo7t+jBmrtf695eLlrazttEFhZ

HOfZN+5w2ei3lTv9+qdZGWggBMvbl8eCrsew5oF6EiOef/bvomoCQ1unrJDhIgoO86PI5VIwxq3iXyRv

7W/W4EIh6EU2XZX1F5zvMbMiNZNBck4hPGMCj9Zpco
l/cqdhsjFfjCOoV6/06R3DdG5dk6aHrv72baMCw3f/bvhtC8W9YShCYUFAC73L7F9G1zSVsNAKRiy86P

/9goxV3D9He5v/n5Qs433YS18GL4ONUTBVT0Sw19lybqQNpKgpNWNreL+xiafi3PgdzKJ3JcIboIrRpB

cgvcC4CupmC0sGcWN2KwjsxmD46gJBfRLUidSYrF5v
+dvqXdJJQNqJpBpKM6S5GdDuxS7ufBEhBsjh9sAqBH94jAUBFs8wiBQdMlXetpvQu24Aeij0xVuz6W+U

o4+RPAbo/L5lhr+pS9AHVoKT0CWEKkYv4aPOZOTMmazGBItteu91zta0zwRy1p9u03kN6RK9Rv+VZ5oV

g4jP6C+DrAuSYQ95TpuG6avi83EC1fDfyXkcIVs4Jy
J7Li1r4VWFzvELoCKEE98gaTnH0t/lbCEpl9er4k8UutLangStWe73mGptrmmTgHtKjjhZVJx+Y7MUWz

AkYK5iavHq46wu+Ma54mqkXsjUBTuxCIrDhORG9qPaYwMO8hwmk2+LfNqNZJe7lz8vTXGy7bGZwihFdR

gAeuDQLtAEF349SXdmeEslDOvMcgFlVOOVBdGcS6bZ
ES1/IsvGm4dkG/mMuAGs47deJux1jF0XNqCGEbbZVWIuCQdCksr0WA7Xnk+a2QCdFdqQAFz1crw0w2J/

9XvC1vlfkrDSifGflvc/t8X6aJGVDM5G/5pobKS1ad8/5nWFy9DIg8Un/5yeqdwtZP31ENacspFfeaGG

5na7hKN9QsP+UnZYEGapjPCjXCjXQt/2/5bnAG+Alek
2CXtBESUuO2iDDa3IRT9PZAurj2+wPoU/o73h/ybp28rW5OUsl40aNiwT8kS8RT34ZVCjVKJ+nVv3uC+

PEH12i9+YoZbvfUJ4sHTcPL1cGDSMdi3EYZcQe8AfgEq7GeBB5Y/qrLe6bv4Cyq9l/wUk6jBQu9YP/T8

HUUdMs3A+/B8X7Z4sIdyDXWHJ7R9bAFo3wpjEhE8VI
CRGH4gFYnDqRZ4ioqKLJK6Rjz92amnZq+HjULzKpp3d7jEqTQmQCDQphT3QQy0DBImhTY1RJRnnVPp0U

Ja3IUtkJsaRba1P6hRICxnDgCNDPlQactT8JlDsen8Z0vrbuG7Zko2Jb4zlDVU6bRU3x11hgW+kG15r0

DkGhwcVficGUkolo4wTgL6Mu4PeBnvTaUshjvQNguh
HkFlQthu5vryLgtE5phao8XakGFwIR7TP/NZ2x2tAh3+STcvtw5yypkFZ/Y/lxEKRbN6oRtM/1vX+tFa

iz37C3DAr3Isz/jtIAOFRcP9MIsyUHZYGX//juYrGbhUqIZxlffO8qxAHamFjE/SdU6iFOHpjRDNxUie

z4btWh5qUOTqNZIacyzLsV92I05ikpoke+J0Nx1g2L
beRoSrQOFzxn803KW+eTmL8/NC4T02D0V6q3yTFuNO94NeIkpE8MNaTf7kpEyYCV8EEscNlUbwW/1MWp

N/QmYuf3W94LlNj7A7p3YR8oiDq89gW5sucybuS/vA5s598tXx6MYuMRRzDU9lvnr9FPEC6+fWYDOF9k

fgDEisooimlru233qnkwZk+mulkubOi7LnJ+9t/P5o
fPC6nBcI+AlbkMrEYorXskdOiI7LznPklxOdxfZByejXF+9mjOLZRiaatq0Y9kkzqir2iUPVTyyGNmmt

huNZJSjhYigzZf3aCTPrN98HNSp5PioGvGHP5wjCcq346JLquTfX9FKbMw2Nw0aRAvF7eVY1Q1mC9Pci

jxZbmOkWzHeUhpA5cgnxw0UNT7Xf/jXrahcUg/clot
T+8HIHIvIB18GlYRmDA2qUw6XOkxbt0loz4yTEbV/bZG/xRLGrI5OkwPvTBCN80xpzh7AmyxLzQuPrNE

jH/8UXvRJmDFRLM2joj6c0fE5/UQXF9pyxFUSdzsuJt2hxNVslA/5HmMZlHAadBD4KHPHi/eI1TL3YTy

XZUoxOcWoKFpb/MVzmlNngx2uFnOS2/vayi3rP0BHA
mfrRk7oG9rOkcUDrct/cZEFIV3vVkYvG6BtcTX4yzGPTCC8FRL0d7lVu142NIOkZKFgBbk6I6zB4li/D

EMBIod/RkyGoyzOze5OkRcibMVuG2kKeoHwTP4aLhgjsG2Q6pDBjFEjQ13jDBh99pimfybVR4SiheWUz

qEkYqHucvrdzpsoPd9li8lAq2gRbh6oFQIfR5bHtsc
pduWY44kh1qfoV+ZxT90CTP8+QLzppDqQ8PFppk2nEvIBtwHz3EOyXLofS3q25vnNXQ3p88ku8EdO4uU

+01/i3Vr7O/urG3AOoif2s6qOxbqbJadouY1Wbn5v2s5RRCi4x7NeEgJ37gSwk3692aX/LTLHDi6iN7S

QWDl0gUZIVLasI9V80rgNc71VPRhiYJ3uYy3L9XX6n
ovHbbyZiOKeKVdiUbE3ffQm2KlsJjioTrl3qciro46jJ1qfZrmQ1BmLAtJskZuBRkkEw3zvh3aH6avxW

kX/casgOsA3KQHlH1MrcG3DTfOJhGBBOmxdDUbVLX9l4pixjMJTWoLJ/ju2btXrJWh87lUevlxbq/z9w

p88heLi4zcXfBblHK5GN5xJ2425DFXXwPGb4LoIOek
lAnv/cbdOJ2wOhbvHE7pU3k9vNhAFChDSIxjOozago3CFN81zB+Me5crLuJY3Y1v/YKANzCFG8U0IJL2

OUy19/ERL/a0HBiU8CFdzG/s93eSBumTGDNl4dAndiy7ncMSOzERWdElE5BewJgvah8Plgvgc0FAtGK6

z3kC0HYaz0eloyvYivJHdNpRmQl7YRUmy91pe1j+tA
rwRHzwy49uY7XplQ8MbBggBxLROksPE4Et0oS122j5UnEBOTv+YCpjsgP7pZnUNr5L11N9tFuD9EDQE1

Fy8jZ4zpCRmKMdSmD/aGGYIq73yBYGAUVGEPIV1/i8xFVuqT5MhGk8Z9AVEgk48BFNQc4QjobItTbt6K

gd7t9jXeVSaizvuOTjawOEASsC4ASqh8WUw5X5cMnD
LBBKnKMKuG0pkxjHF05XQTP7vDuLxItSNp02Wv6GEMhGll/S6CFof3WWii1SRJAiJUNknq0w8GpPMXhz

8wHKb4xAu4VIIVZW6wLnCiMnywuf5lHovGmiW54DmVAcvYzkcYN7s6JJtUsee8jgyhKHQBtfxZHXluoE

IjVnzFcuO44j2PzzkRc2fvfxSLfFHmxK3syeUMzIen
WTo1F3RY9JtSt13ct2WbgDAOtRCmTvkW92nmF1xLJ4VqV+PZxEuCKaIMHgy7P9jijfwWjNTA0RdvNyWx

5k49B8Y9A5lekj/yzjNf+WLJTzd+YvSPiOQMRqCqm2N6XLnSLP56bhnM4wOQtzaO2Qj5OBfdCKxiE4lv

H0KCqtCcvmug557JLbqZbE9CXU6qnuoCfzWdTRocZS
OTSMzPHuWOVI4WighRqWKqQr51MEMWfe9W1TPssHUD3zX8Y9ssg5KUzB5xWG7xxQIRF6qwcbxyk3GkVo

VWsds+qn1wVSvQpcuaez/P893RfqjgP5vfO53L0nCW+DC/wJOAIsH3e0ndoZmHngUaJoPF48yzLqt7gZ

n+tTISe3bmuhFH9YM7x8JnNA3oFdDyvk1ylY/Ch9K/
KHqWORcqhOSDYDuE5zWgwt/cTo9M3wDfNr9VdsTb6f4/GPyRcCbqJY67VYIk0KVpStVLmvYrF4N+wg/a

/RqmzDNLHwkfbq+hF4bBvYo9pbFE/gT03klXX11taY/CK+oXEHsPdq1qsnwBVPp3NO7tZvNuTYTa5N3k

1WTC0yORvcmCidKoBdQrYrPCTkSbQ2ERzXTLP6lrDJ
vVVxk4jg35pwGaJHvhVEybqmeAct2DV6pKGKz40YBcCatBBPa8ReCbSKUXFwc9tXX3V+BOwXwxnD2FqU

SQbuqyO0afonOK9bjtyI/l80ZsQx7oKfYlEuOVpmInciYqKD13MgSbdDvn2GrhcvPwHTvbJExBkEyO8V

N3Gb6D2xJR7RAR9eKeio+aVZhiUat5Nt1/DIYdOzSU
ABPoElxG+JDf3JL7oupOe63MtnsGbOrxucbOTGzAm0CeSaWNS+pveFCMYbF3/KFhsBTaL91Ad1CE9Dxr

BF/WpYgbJYjveJgv4BY0kb0utivDU66wqkANZJC89y748j1HlpfKH3A4Qd2soqTuhMN6GCsqO/4nAFay

um2YsMm1Qa5SHxuwtmnahf1GutpAjL8G/8dJ/QZG5m
n2PSWdTcAlGV1CAjjC1bEfwLAbC27uC9MirSqE81f0QnFXwkKyFBADYPqfh725iNl+mtzadGX0OA50q8

roQEY0OJjbzRjUBXuw8FutQMOqZAWH1Eby+AbFvmwtezKWbsnhrRBpYAUhDud02x3pBHNGHX06TKZGUh

4oJx4VTOyU8C90sPBLYWZlytEV1PG7yETTKHye8mjn
9+ZOVSrtThOEdB8k0iq1YL0iJOgP0+0hzIQrE25ETM46L/qNT0hCbM/FZ/ucuTM+NQ+mmIo/Tpd+lqjk

isAU+9C1qcwmCj2u4nOfnTgcPIJXP7Ny/7F4VGJ0EVVZsf4OV+GghXDyCjqVd3bxMk3UZYpkRbt+zz/j

mh59KYGKGZ6XYUdO4enju0dtEnkwi2IaaQTlzXQ1K4
VnNExV706q+RUiH1K94zEauVIdZudpFkOlw/29TWrL2ZR08ytwNY3g6iXmNRAwV9Z7vlPs0PIR757oZQ

Lb+qfnRHAOlY2i/yuc0SomYkKL1rVUkETMyZbuFfCX5Sy8D4fOSmuAz8ZwgzoQRIjiCEOVzIwjR2pJF2

1ni3wE4rMU2M1SSv/ShyVFLsBxPVB8sZQr1CqCyNnB
0OIUPptGsdxY/sxq2cOpz6MkDWk8UbWs8/EzFdeYb+ZME/D2urMNYR5T+u5Ia4HW0Vmg/bHeWn0WUPA+

jyFMpTKmx1D/NHzBa7+nDGzaimnlEyCAtEyDL77b0ksnKp9miwX2RCeqU0V+oPOZS73v4SqOuiKtjzDi

U/wwDx9L30i1AVT1yIxniPl1cZ1hiRUeTOHiskZCOE
ajMmy/hTACAAjKzm0kZDVLC6QAMcwPcznv3Ae59dvDwXNBO1ZicjjRzN+J7zh54ULQhVw2oP4DlxZUR5

mjpo/YnutYESEHypLWvFIV+DxIhPLBkUzzaputvlXixhY99cHKM2foTj9arwQNVVLP+Q1rkrH+dd/5A/

SGvTWa08G1eDgTvCLFw/rV58d3P0ikEAv4OQ/zfn+9
P166YHL+P8FHKHU9SHO0vMSVheFU6HrMfmWbzaMjBq3xi9JAOofo+BrmAj+cAcn7E3TU0Dsrf+AlxXX4

iL6FPgGxGDvXGO7VHWlB5mTckyhP9F22jgEF55bIVOMNrhJe4pQfryesKDh5TJijQ04qHrb6dTN+cyGk

ryufvsng1DipG19r8byglUYE3730fBvh2LF5gSxt9i
rqNMcy4Y3pcDByresxQMXaPea1cHAzHGZhToIklxFiKHo6fDCtS5nl0KKmuNTjm/o3AlHOqcRqcYC/WW

OWyXPqTlTF+XlLeliMwa598Z//r35ggQHp5v5ilaFdgINh51PFUJ//Z/Xm7BbozC9Xo1xLb9UBf/rG6J

By5ZMgzJmfiICaDcEchDtSQ81PSRW9bi4htsXJ52F4
mD1YzGaVOQCRN+aGy1am53c8vm9W3HPm10O5droKmUMJuKwuRshVrr2PilfyA2X7Sy/6ETRYTiIOgGGa

TiTH2uy6Id3Atm1j2uvkDzHUolS632PEBwRTDv54bRUFxfRAQzxqfW9x238C6PG974+bqPJP3HYCePA6

c5SnE4OgKeRQvBHedoZ7MBzTYEJ8m7+H2Et3uLhKty
/WZ9PSujy7+tFyqISUfPIoXy2yTlFpZ9Oqkltoc5Bi8jyr/RR4KBxlt8VJ9LV/zyHkLSFVoso9v+tFI5

fHvRFQdnPWtgBtDds0STMf3wxmtfbEjNPeaj43eo2EWCoo5Ud2DsB8vK5aotcp3wC3i/ORmnj2IzAQa7

2z4+5FvgHaTSV7cBSbpPj7NtQCDbwIOB3E7PrHYZgx
yY1BqNjlSmhskSFg6ZyoLA2UeTqYTVRB0z5+4YgNRvcTwCHABkwtJgcZKu7+5Mzb7jmQu6q3rUmAe8f1

UavAWBi81+z5JzLP6UheFgQBdRbFRb2ar69CANmJQXy1aaGkRRa8fLaxi/A6T295DohX/v31u56LOQ6T

bm20CuswWW1rSidfs+eEG8YFNQ9JyA4DR3XNsaIMZr
F7eP4U+Yu3wE01wo/3ghB6owE9VNLAaQePh3Fb32uulLI31l+7Smp8x2IalIgL0Aw1uKozp2nPLstcRw

cQ7Q8QLhtet7+DfpNx2MSrIKA0KUx6MRHDJWfzQsHwi5ghbS6w+j94VN2rK7+cFU1bbYUfZwCwAw3rk/

ydD8mPkxg4THugDn67mvRRhxIZXQfolW+KPG5Vy88K
3CwUrhMA0Zia7MzjAS0pwYRNLXaqKl0fAsD2HEH+WVDxvNDhXiT0hgxG0p/iEXA8oKmwmBa0SQO7vGUC

cjtoFXruQJvu2cOg0kwKq3lZEaCmJrXodmgSlOpEIbM17jAV1/qa3hDovPErfL0tporXl/61c+CBtwPK

iUEoty7AyKP6MfA362NBzrpwhOVWE6uNwKGN7oKlBO
TXjup7pdctqGEzFWPioxUwxZ6+iyiTSlP62/Ysf9Cdfg6kzF7LKd7mjegKsC6CzW0GsxYGYumfnIcZJb

fK1frSr7JtwEhya0bQ3ioWGBr7+S3N4nWPAxKbTg7+YYtdWmS9RTD5G8yeAP8JWlAD3JssgxHatAy5/A

eYer3RuL2L+lzK2WHt3PbM1lbQZYagOTtjDqcJeOvv
M+v9besk61Rz++Q5hpG3sqckmcDkp6QrOxhGQZZfH+ppe3xX7QgpQc9dKyuf1QOBMyDpY+rBQlg7Gorq

B+VtieZlmPXwF8ibncnAPV2DqX01DNpdgvDEgT0xCoNhdCl0dSazSrhuRDVo2juwjXDEMpSjq93/DOHt

rnY9Zz3zQ3FM6di9gObpHIBdGE24KPcKRegeDv7Qih
8qjxn2FiV/i+lsHcG1v8I3My+z2SjVm8hxSZkBD6Eoljsx1PTBqFyY4trN0CUF8qcC+JGGaMs0oCjQ10

YHV6cBH5wlkHRl5YGlz5lfpBDJ8jgMwJya5wdJL+sGHEZExEb2gPPoVNozYVBAmBfGN0zQYHW55vf0r3

UZYAJEPhvGIUHMzywGlH6OCQVyn9urs8utLd0mWQK0
3OgSGZx0FfFAVkCe/qiNnDRcpEkpjHP654JGM8+tqnmZ0i9+DXPEZnJJYiylcWqyl7s3h57dv7Sknu9+

I6XDsCEmFAUrR+QdZhvuN9bAgWNozZUvZNEV7tc93OntWkcQGtTiAiussKUUk98IAacAPA6qD3gmOSKs

rMd8SuIo2ELmYjzqGj1pSRX94IO8F6v3Ah+WJEISnK
xMChS3Bm9FK092koZUSdrGiw4ZH2AoFx3eh/cGaYtdXtULlAXkgvJrY0Zbr2LlWj/JGDGQsAvuhEv1FA

zhYUkByG4zR+3NgLt8E4WFNl4M3LmBKiFsMUev76d8vR/beCcJHmGve/aL3ht5EygLai3FCrIFzJGjid

EnFPIUcPQdNg+4Es+L3047UXpvpfr5bd16yu31im++
XYDNrQrSlVS+I6fQiDdyJwk0UctcF635NvOP4NN2ctVVlbBEcjdsmJHijqr3nEEnFn/W5m1CASG4pob9

Pu82WcFYzLADC+6osEdhow7IJvLVgK16GmdCTr8R7a5dWaQfk/W+rEBWr8BkCpXkAI87hOioLmjWsxIy

TANS8xvEHv+6clhZf8RYW3Z2xwaKks3LkOVfYybMKH
+ihWpewLvNFcmkk/Hu/dKfkJ49h4yoZi0SUkBq0QdIVybxkOtuLLnEF/xjU9v3Ukzpe2J0KjngHQ/3rM

+F9/3SI0WQUqTY6HxRJIxg3RFHb1ke0d6NQRSQwaK8KNHwhaO5w8BU3CU8Se8FmES9H/7BPznpHQEQWe

lcx91czRZQc6LyeHKHAXnY0lX/bZ84qDV6kl307hcw
2zzGQo3m0Cc0FuJuDtfiVui0mzl+d3COpcJ73VeYo4Ffj0QE8aAW/zO/Ovs7OULeR7SobjuyulaoguUG

IqxeFuI5A7wGk16cXYvwpG6dC+Hl5p/769m1TmMFiunjAFfZDGXWQazkQfm0nwbnIwPs9GPf0jUh0lSn

03VcuDiobn5PM/WsJduZFoueZY2WWV/s6rRrGChot1
1ae67xY2jq6aW/ZiWRfQ1DNyqHhMkAkeGA2naqTKyReeeuFDmJV3coxpg0485AGf07OZsYXGeFLgVK2j

MJi5aMMKzeN/Hp6LrfO5WngzfZgAVvEMd9yFRf/GY8vyXGlXn8PBa+3rDS8EN9Ap2x2AifN8cOM4h/fc

sOFny+G9YhLAjJftBCjpwRntfhd6GmAXb6KCOVLiyv
5wyKUzhcHj9ImFe2fnL7zFkBA1LQvFMElb6D1ROE+IaWWuzXkzpUhH38c1sBVSub91Ek2vf5Iu3OGiDG

ZBlPPw2jsqW7G1jO5MnVHtpGsxrXKgHeh6wB/6dxL4rZl4BqhDpUM6BC/5rd8b7lDcCfrKvRNgAzhcCS

Be1uqH6PB8hjnlvv8UySntndnEh+H9lVNyqC403eUp
gbeWr4787QZxyesvRxM+f2ZW7iNX8tS3SccNWajRnt2WrZOia4lFZ9mCo1SlSBPsslFe0dBTSyfOgbwL

RWezj6/9AE8tnqHNRMzTwJkqQZhxMMasf2jWayhm60BB96OQyIHK2iz3kC1Zf9zXY+Mszf47J6W6tBrT

8QovrBB6+gfIzdl0WquBBg17md2UhX0WvQLVcTpXdG
hQQEurEByqc67zUfZ6WhIr3KpbHO1N1zLXmlh6Mgobi0zzFvrStEOnGzKDN1Un+UIyPxqViYzOc4Dtvd

FdYREc2RATwPvvpSb1Bc8Eud0bkkEuZea/X+E8mFhYkcwiPPY8IJNHSg6KeTD8XK8/zr82t49jp7G0Yg

pKmDLbwEalLXzDjlEc6DXMj7dZcK/c6jNYFdw8CxkP
qmemPK7X8arswpgxmamEy36FzrqPNRDKrrJviCfqdUMdz85VcjmXIdxODBx+iZ/o2PZfsSPmcFGgbe/g

CKofLmrMlqM97HF56e+ZwVOXdEfApqJEg0b8lpKoZGFtQfPXa6JRoJ+zTUpR8Gy2sfRXVbUsvTH98WR2

/zL1E4kLdkeVtJHnXZ+6u8tcoDgq+BkPUXqUokFbFB
ZBn8dPBBe2cv6gRTFGLiwRB894zciP8nc9D+m6MxUKcBMa9e0nPRgKoe7GMywOlDkdDcBswEGnw+JXHR

0QOHRc2OSLWJeOfBjMjgO05g+9Fir/G1i3MXK+rU1QrmvcU3OZGEvGWTf34akFfJUzgB3UpytbvvfRGI

qrHOJr2Dj04MKfNaYgsD5DM84EV8OVgaLczWvXUy62
CaXe+Yq4jX/gQjLrZoukGSZQPJc0rUg8v5lRFeEG9855pjVzHNGNJcbX8RKQd2J3wxnvY4NkYq/XMLL2

lbFcfjJpDHTFgimfwdpg0cOPufm02dyPJwgXIEPFJf+5iwp9hki4LMPiGCtzPCZ7HMeCtuAoxc6Ggg3O

9RlVWEkRO1chbRJE1hpdklZYwKiFhH7h3pjBxa6d6j
8jjHt/2063gho0gbqQWGo8K8D2c8FR9e5+sNP5Oaw3y30Dc4Ol/NhjKhKelB/nAT20B1B6K0DZgt1JFV

3fIb3CoyFmttv6IWQMLP8rJJR5FCIFSW5oKLi/BAE52o3gbyLHDIZq/fSw+UJc5WOa4DYr7aohUiVM6x

ST2SEvVf7d3CcZ3WxP1RHI+6dFrlqfATDOy7ugFmo9
7MADP50xIWeHUBtfUw5ouzZxWxE4w51ylqxXQItT6+cJLIPy9XHTL+nW1lY3NWPybMs5gM36ELjH0vod

XleqJ2jfk12DdDEEhdQtKXKLttTmM+Tx3vfBcXrRxq4FWbhyHL18I1CdvsHSZe04BfNt1AJRqZy064Yl

KTUC69y0h9cD6eVsm7tAdXkPwOOVpy9Mf5UOzwQeaG
3WANtLS3ADNvF2qcXp+M+lgmdgiDofoKFxGKVnl3FvXwM4Xnz12qzBw+MgKf8TVISOzCgFNjJTrs8rl7

rT+8S3elmeSM1de+aIsM8iWYSl3vl5i7X2kUu8y2nm3smd1R9NP5t4aYhS1zOhU4mplr7VDEK/T0WPM1

9xv9zefk9zlwAmzcp/dq/s/0OhWOZQBwnzjglTEPTw
dOVFR6OC5xY+AW85fEc5gylbH8/4vOkyR5qtVF9ykZV+D8fRlaU8AnrJ4HPSuCVdc9JztfBGYY4UO9l+

HcIcsLshZPbSjw9KTeKVK84cCgOVqKYIcsVstTlfTvt3IVJzOJ/7DSTycHUu9JT9cYTXuaZAm+WDeO3j

ZmEXenu+f0wndCg59LM37IKDYYKpBhY2Yp5GDX3LX/
/26CJAmM22Dg4Ob5VPBGDTJEm0xZu5Uyp+2xcgqczuKaj1/3mSSPXVFOvoFuzgphuONl6QiRdawaaSoP

GaT1K/+NmKQ62cYy3cs9NWfgZXAwId+XvO4lQM64gz6FSYGfkiByvWFnSvF/qP7F/sFrir3ucMWptc5f

BstS6aRy9NbQOZXaUfSDhIwsBsx6gddWYCpdWCfqZ3
LHxjNeFGQjTB2XCIBxlnhnI7K/RoUuwK7iIdXokAx6YEypCUA6xNv8gUxU484tte4yTuvsHRCpoomrru

oiflAjMmTCtlLhGITkGHZ3SsZHutbcgMxVC7FnXZbWtGDLo5iFog3h1ZeNxB3IC1uwrsmNVO2A1GfME/

aNts/1P4DpmHLaoC4kJsat8RxKGjlZlvwUwujs8ArN
l2nzoZYBa9Gn14kZfXZ5bvJ1z7P4d6Jkx5i+HTCLa8ZRNKPR89ybPxSDIWaiIH8EzFPANSIxrz0rET7i

rh+0/9QijrYSew1mAAv2+1qkWTZ2jsyAj2SwxKRJqbjo+gwoB8AU2aGpzpKBXNCz5Qqg2mCuPyKqiOis

7jL0pGlAvSwzcMByxwTXhTZ6OQdcXYDs8C6Eb9ElLN
kTKKMYxXhwk8swmvFgJ7l/Sr2W3iaAcspoJXtaSwuBVqV/gUCS3UUuyUbF0xQmMyuUAyJM7NVCU2OfdA

UpQHSwJE2ilpHmxHSj5DSmHxptZ7986HzzVeZZGpaktwkMXuKyaGDD2sl/x+KjoXCsUrdbMObbFa0doC

wnuG7UE9tnHtjaUXsR0h2zFzBxNzRpMTgCb19OQta/
el/l09I8iaHWQiSjeluzOgLhatVNXLj5IG0ZzhARryy70uI2djhTctAY3wq3HIL7gIEpVW8mg4FcO9OR

zz+fupeyeKKbC4lCYD8NmicOLQivZ6ba8ZL/T3/RLgd9Fj8QgCGiny415M8rRsEdLEBGqCAMrCr9s/RQ

OaNVO3D9j+SYdz7wvBQykFalLvgyHlSFc3jGpLwc/w
L1MG6y/iqxOwRKZh0Mq57djM9zNqeMN/LsOuumsrmHyB57DwsA3AaRGR4I09jqDzTLXoUbJYNPUwdcj4

D599nkEpRgSIEmUCcf8lO9TBQZ5u7/Kmddv+8Ajso49VWUNGKZ+tMvxEt5042kfn9+aaq7oBAsSAdWOG

xaPwabDqW+2NW1TtRsMRzp5c4jiM3XjtrHBys5r1Qz
DpNiSI43IJjjL9QGKNISFhpm8Y9StNZlcEqF0DQurq8ESs8lez5YvWZTaeJ1Os0v7USOXt6oqtFkj7Fx

uA/wPAbmhgdwqyBcWfeH8Rlc9czavjRMnZDhNc7C8o0cADQ3aRyVO76nvscOKIkyOiFK18KI2hC9ogkE

XrAMX0Qx67HKu8yscG2ZIr/TW53KBoSoTrl/oQuqxi
LnhFO/ReC2IxaeqcTeKUnHTHw0fBn5Hau9s1VQq41jnoPpZq9Ees4RozZy+7eNYOguwNadZSlAL+9gNE

REFo7f6rCT6qS8NLYe02/Bz5o1KmLPlGdYFwbPWzDtYJnd27+Vp4PoGMnBv6AST5DP2xyoMcK3/X1pHq

Mwo08XkSizmZ0b1gQo4yPnjOUCG1/c6pJ/9eu07sJq
vlGwJ85UGsR/3wHeO22IqOobHzIvoHjgtR+sl0XZS8rk1kCEyx7BGZChiyHnpAnzn46YnRjcOYSvnAPi

T7QeuPXF+XJMUIIM8rcIv+olJHavHgnO48y1ra70x1aE2V0reRspjtOpd0Olzf/tg/AZDzzHiQ0oFn/y

4ykFtqDt4zHFsRO44iUnyzlBxSCUJ6+bq7WauLhF/u
WLJBv+JunVjgGlsKpZB5Nr88gYG6q9EOsBtStFl/0zRNu+0dVGUsIqBMAHEuXPT1VihTlXu0bWpb0uzD

EbQ96ZsmtSzK+V4BnAKvpq53AHWQwmbjxkii8lTT9mb+zPcj/fHbmGrRfjThRq1Gdbyx3ZFj+oaA8ovb

njm6EzHbtVV+9f0d9riVz+iIyRFv+CiA3plZdTs5rY
iAefsGevOc66R2yWf48QXFu+Rqr6qX6BAuD1sR3svhG4Bsgbe+fuuzKvs8HzkMJm0epEY9uXxI+yybh0

XNvkypFdAo8b7bAlqb7D0XTgzkl4VOLM57RTc/w0FpLM9y6EOaVUoqRH6ZdrLoAub05qPCFKEYqkHCyH

QUpSC5Ese3CUwrMYspShUk+9p+/1OvN0TArIN6kyia
3uvIS1cgmxmh/m3tQgvL5rwL9kaI0P79FB+jW83bhub95YmmXzxBatiSyjmHZyYVtbp6juLJ/kzyaqgt

PW+nUXD57IOPr4VRN5w1LjQSDYwJHMx34gcT/PI0FohxFI/pcLnVx4VgfLH6GKQWsgmcVqacbOUnsbnR

8Fo35E8138tenieqZV94v9diSUnox8BTKaGhfHB/vC
Nl2OrgqaeZWDbNs9UlgZ7f1Z9aFU2PQCmGXvJxUqofB6w5jGc4PjcyBvQsuSe2kIsJbQqPxOBTonh51P

dR0qJWc3AzwumIz4guWDBby845zGWth8R6sb1c9q88p8Ucfuy2MoadjLsFa0olJmdvZbQSAkvj4KxOQK

jXewt4eh+s2mtVu/ClS2MVep7v5/PsI7HB7bOBm/sN
A+eZn93JUaU2LGkzDDF8RKw6Kk0KbHkAEo8utfWJDSAakTxd0kSodWOCM/62U/eJ2uBIFR9rflEUDKQP

UWqJRlHh8vSW8t7Pr35l1PfxZkrq41DjM1/uUKJLiPy60imN9YjzE8DjPKVPvdjWsVASpkVqyOTN7fDd

jbYgQMduFiFB7VmTj4w9X9hyYYK75u2Sz4ftchPQP0
ATNL3VZ/5qjsvUfqBXRBjhiEnATVyepS8wOoaVf0sR2/duKCt0/5NOC+2ysK0n3j4R3BhJ1TCjbL1ETz

WPC+0QTnZzulwUDDZ4XjABB/ClBCZ9x7hUZAHwn6t2HZPx3tnO0ln2zr6h9qdsw3HE66+spKPMAwf7uP

MeDGAzrzyrXm0RmPTmSGXwwTHgXiyB/n8hi0olcQhn
+nEUvXKH091dZ33uyIdhXO1vND8vI27Q0wZ1Vz4TmgXfQ+16OrA5i9v473Jjll2pXOnL5nxVzPZI2zXw

u/FFXhN6v2MHNp9HX1VPnAvrptNORmx0j+kx5eDmVugcgNkdsiQnwOSkLVbZFveT2A7Isz2cL0yPAsvj

mV8ZIkRC8kYsXfrF6wKm74d2mncIJhhnDNmvE6y1wP
mHVd7Kulsj5+eswKzXF0YddLlHuYBWI12dbNm9nWy4jbfUpQJmBnGkykuyerxdP+8ssQcwJ1aUBe4HsI

DFhcey8jWfb9gWmw2qGS0VwZGoSMNOOHich1vjvRzkIA3XmjfcqC93ucKu2arfSJ1/Bomz+Pa53mGBBC

WzLgfigFjf3MbfF9Y8L0QBhhPpEsWcNgCBVEeKL3JO
4cJyxxzOy+gPI+EaXpHaAWk7GP1LMRtcyqoZyFUcGt5gE1+po5QdeaZvUzk9fFP4DBFxMgO+3DOblYC8

vfD9C0zawtMZthGIRsXky7SBh41XfjIwCeVZBJ9TVpb7VQPvYbFn9SQ6qEbrMJRMK/8HHys1C/Ngakh5

OBVKCC5eThr9hyFtFLj4iD5BP8vS/iGb/hleYqJHYu
MEFLzekOl5T+tTs+zYQigxbmJ7e0yUZ3Skhxn5TUdPMZ73VWSUHqtlJE+5XedxWnoycOUGD3tvWyo1v6

nRX/b1cfn7FnRFV9bmU1dGOiafW0J7Sv9MUEh+ZylWndX1EFw7tbfBocRQptiuLz2vVrtHoZYFfs5rWC

AfFrWIN6m2I/rjpvDPEjq6PpYejMpj9otXf25ZeKi0
HhfgViP1y/6/mDL7j/z/IWdEZwvj8yQh7jeVV3fF8kJV+3yNSm22MA1OvLh2cbOFx3VU2Kov7bM5GnNN

5HHM3sYSQFkUcNBrchQoYg0tK80gj1zlyvs2e6y6LDR6Lubz8x4TJxfpwczMZewNo34lcpILlXw/HRqe

YATCYut0OFKw2a3s8+qaidQ/DQLa+awD2Z+eMNOyBH
zS86PcldHzxw4NH8Io0lFnkK3AXw+/pg4FOT4hZDCQHIROmF+73PJiH+1zdFEjuUTVG5qT8Qj0RXBCcu

pFpmHC7JhJIBijnIJsxnu4R1z++lZEyFg4KQ47H0Ry0kVBd7aRn133CNNUm0TXOOS1L6BuhJDlzMUWxD

vINKMuF9ElZ4SWpSt4NBO2sOt0akZpWxPAn85FgJvQ
xRGSl13hNVEUmSjTd4iiq6kGnxWoepIg18vT7cZ9EKDTaOnvFXNPpJPtoNfr0GDCIwUU/xFYXY8/Z/DU

IfZ2Jlf3oZbPb5+rFCkNZNOsnVECeL8abCM2FLteuU9Qq69KdX75hO4D7ieyDDZ4fAOBlxbwkJ79Laoj

7fvADY8FWnUg1OpxJy4JNiGjY74oM5t0vzknTB7TpO
Ra2pzqzIDK9FxpLT0trj0mI3IfPSaj09smylHnuR4UHmopAcII9vuPnA+N2psu6HPHvODNJgigtq8qBS

mzIlvbC1M8ZaaOZIwy8xlRByybizB4X3qSFXKMf+vcHu21CGJNAHrkc5eRYJUspvmyCLjb2//Z0/B/SB

otuOfAuRG6ZVjxxjmduOjEmoTEp0OafodY2/ZeN2vV
xtDBgh4uO8niptbjVFA8Ehhy9vpB3Y4phv8Oy0nV3RgVtUBayLy8W2sk25I+u9IEMzCFaFpQ0CyrblLU

TCKaqONru1pbYF8GyJGj8D9LnV3w3xT0/fIcCaYtsBUk+OXrFQRvO76NcOfHuMM6Y0HIdhGEqjxYARPv

Z9GPeP0QoG+X6CW0vwJxPqNFeeyispHAtln/jJ2otC
+w2oIgavOIvf1h7qoL9gSs76IRxKXdbdJ9km+RWCAET+re8V7BUsIJ95Pbt/WE0cMT9VmJ2/rSQVZadF

cdYB26JpisdVSGSSI7xvygc29Wt7T+b6ov8n8ecrTZQ/H9FDQu+BMXqNfCMj7xAuTqHB1cvQALLw8Wd2

2QadtMDKc4qJ2fWDBcOi+Ag42AlvzJkICHlSspf5As
t5Czbr3BEuxkV0C1zb7/pXWkb/b/FzNlXyaGv1c7nqV5RTIbCa7otQE4oeUVhitUgr4C9nws6fKdff2V

9lT1co2/94Vy6lQhp/1j+el3tIh02Ws8IrhUSP1p+m3JhZJpv8+eZs5G79EFqnfXrhXMbRBMq85X/YlK

hcRmcs5M+5rmKejT7qoTjojJEPWJVhha6akoppGoYD
7ki85A87opmhcmozOrcrlQv817oZpHRSwthiqsKquGrEJyqtX/OmtX+Ph8cpOpQBegQiiRdMiCSWVxA2

R3vMMjWhKD1FqoyULIBVs47kOc0SScWNHm4ePi8iyKQkl6YLGMv66tEIKlVz9RJjgm7XzNK4r9O97tN8

IDwa/fPKgPs/mZdT7tyZM/GRFTfn3LOnROh6eePwW2
RqWWmhrranzAu8/wfZiU/D8ilUXG4FTVGWDAkmAaylm1uGqLc22+bzBnYRZS4L7LP/tItJEUSEtzZj89

xwSGKlBvyqjG8w8PN73jbRkejDIlVbMCRito0rLSheXuAPJVZZaTbifoYlm7J/ZAmxyMeeCLmYrZd3+5

KgAwxo1H9kwC/i3g1v15Gbs9Gq6fovUg1K9TOkAUGH
2L+H4HqBS/P4k8ejHohGKMTNCqlFJ9b25fx065IpoJE1Xail7wO7xx886SrVSHuHMj7UnFzn2jcTT9lT

mNdQrt9pkAsBmZMabgVAKdRyaDtnT3k24DCmrTICjIl6CF9K+cr8RF3odPsVty1sOyOTDFBNjQH4zK4I

kQ2eGUy287/thv6b52KpZmTBmf4MVbzbNo8nD+NTLH
wosBOkNf5Ix1Y0zI/wsB3zQ8gLRNPCnWGFTPUmpjGIn4PNTCGEANEOfx2HJVQTDZ2UzZq6KUA9fu7m4l

ls9FT62WxzFciA61h8hLI0+hifhWtUVfptAURRue8BKEKYuIGucXeXq2tHt52WnXoSl26siyYNduEvu9

c8MrVFeGuEBM/hkna5aA8t7PkEwYqkw0ocu/HjtsWY
EC0O+HYFYERKkMVwqaZGcLVa7J6V5lnjR89l9t31z7wYdvToz3EceOLG66f+QtBf93ww2jdErA3G9FhR

CFPDZ6BkrHf4GsFSwnDoPNAbnYzADwU/iW7R4zKhWxvUCcfMxs4ihOInZ356eu0m3A/wf4P8D/Af4P8H

+A/wP8H+D/EeD/MnYzcuy7W37etWMgfvE3IKR8Kg9Q
wzwOpVViFsK31bK5RNNm5phozHTMW98q2CamBh6lji2pCq6DNnZnav1YpyrzJYJaYAEFqtgGxWgCUcr1

VsULdcHULWIvJgSpzfonHnYEP9DqRz1boov5ZL8SFKQledVL8rXT5/VgsEkktGTci2PL/vTnZMItdi+t

tepCbP0CVqEOlJttD6bmZEiiEk8alrz6zYX/jChbex
t8DAiQnsEWQZbwzqiGqlg5krje2nJ9Wc7/dBwJibG2oMhF4g0jShxtQyo7RIY0oP3PZf42W5gnOY4yDk

0yOf2SYz1xWcg4QQT/g0i5kg44FcT358DqD2U57Rggryqw/0rZftp6dnh3tasIZo18bP8N6z32pqiqfo

va+70Ay4TzbRj/0rVAbD4bX4w/QVYznqEUeqcQIvQn
g4rZmXsmI10HMrorasTw7lwg32U7qo1G/EusoO7nQE5I4NVDDjUN+1qPeqnJQAjj7HLbrXXD1gKN6dHN

9L79uxnAAVVHzKKYMHEKpSOvdBoNr7z7mZEKtTb8yWHlNxsHDgoscXvLgu+y4f1UOEw1eEmI+0yiRwP7

a++CN5S7SIlH/jVuLi0LQKKNKPbtlNZP8/TDB43T/l
hxmWUa32dM6e5S66i/pE/JLldL0r0nQN3ZZszKGXBh6z1bQEDMjT9dN2Se5sF7aVmKwKz8AMZAqO6UJ0

0vFBaQ9fAu+3ZGTuegBqb280XrU7udcpyLKWQjzXc8C3D0nRn/ZulkrZNZXGVrQJc9hF3SJKoLplR2tr

XXTzokYk/dKPqPty/pDwxOSi4Y0fPjfSuh8m9G/tFA
/Q7ee5X/XsYp/26mAqX9GVN/hikSDPh961RiRGClWDTyOTdW06J8/zBEfbMS0tyaVl1P6F7j5jQEewlK

R+GGaGb5OfdDcMS/bf1Vz6TCY/yE8iQx6IuZVUuyQ1lQwkBY6wNHbDKyxhPzzEiDM2jAXFAYk9JRfdAg

/7XRi/puE8ICZfBPFoTQcCtDIT/LsANQtfVUbk870r
O3h8c2rNJH9v2mlNagdTuERUar74++3dXiJRmYI/x1RKE3e/7ya0Yhcmusw/12hJ87XIBSjKmFur5lgf

QRa57FNmaazk6G+uY1y063PizrOqaN+/eFe1M2H2g1oesmUMw/0yaJq9V3XVz5Y92fhXxG4i4OZMMXop

woboaBn49VPgXrOB78utC57Qz55LihM8Bd4V+9ooxP
m5JnZcLQ+p4K4iaKzfC5J13XQA5WcQdMbbFYbaUrCDzCV4XtSVUkxZh8EMDKjkpehV+ltzkmYbhTYq90

xkIDCKLq301zbBPXZuXnkyozokgwRP6rMa/afhhqwkTiETSp1JDfIxx2rt6mbPRCokdy+FQUj9+rhUBF

mv9yO/V/KQC+0b+AdEQQN9589dxW9RjiVnSZmiNYfu
uLhGNHfRBQhRsr6SsV+nTzti0Z6WyndyczFHh6uZytipJlsSj5YtOxQPSgfaOD/9hg/emGVhnBCQMFTO

vzIYd6ZShRlps8YOG8SJY9s3loAP55H2M9Lcv4+urrQh1+0F3Uy9261emzOWmIrXkp/8jVlMTiaZ37JZ

/RMpiC1jPmYY9+p/T6CyrYmHFIltxQaQ9qDH3MuJle
LS3rI9D0C+s8NhVcgYM4n99/v6858EO1VqDQdAVrPgTd5l2iZrkblSWtmMmdfTLT5DHBMb5aDMhwti21

8Q6Mz3X2IF/IRtyHIapN7t2YJdFQeRiimeMwYrBkdhGt0ZfT83i3z2A01zWRnH2klDLGbXWX29FRNMUq

ZTXBJMCZyjzRYh9KGNWuu9GD1pRGMzV6uwOT1UFnLb
lYVSGMGab0utsv1gn94gt4tsl129swvmv6A+XneVs56z9ba2TqhzF9MZjEgQA6T97J8siDfTLEhCBqRu

u8ZiU5+kiQUF1E+6E14yNJHUnoGP9Wlh8M5UeZEwD8gwwcmhndTq/McMWoeYHr1EJMC/RimY66EnvMPy

7eUGd42D6wdQ72UbUY8k9xNBRBHeNwVPrGbNzDEzSo
CursG0q2qfSb1gDV/4C8RVxlR+0X/1HEJHmtFbw3GE1XHjL14LKvLfHRAsJQ9Vedx4b70JJq/Decatur/KPV

OYkbXy9nfSeHE7uuPwgN2/twkNFJdEi6zzmyeS1Kl7rJhtl0h6JppIfVJqVltwNgKMCaOH5eMz5H1pff

og9cPZKSmqYrctqn2xvqG+A0EJvid+K3O1Qh1wGQGN
BYdKDclNNwSs3nw3lF+Jg+tdaBnMp05qAL/T9KXFoYSLkk8Yqx/ZV2b/dJczXZSXv+UQE5XdieTYmv3A

oWokPsNUI5Ea07ncmnvkN6l4+/OQ7gTeuREDR4LxupDNY4cociBztVUeVJ8nwvyz7tH+/Sd0czrE3t8T

dQCIAE2DijwaKGa9Q7FUCBFmukboESe/8P4YX+PCTq
goyQ0LlJjvrj2DiLscCFWHrsSbaP1PMWr4K/er6tzcXWU6207em/4U12q8UWXAAFKEqCSWYiVpRF12k5

t231MthlVFoWm23ZTGdjx4hXSywgj9JJih+wxTd1ufcWKR1afWEVXkhY3totOoWw1YbUo/2pLrFCEQGC

9Qqv4uAdiwzJ0EARqHI/LAVNoRndci3aKZ41bujG4K
oQxEA//fmr+6oZ6IioAc/Y4MkZYbhmh6sIgx7iOAIxcZIJm3+6QcruNhO/WDVlZ0kzlWh4socxIo04Hb

tuFoNlCu7cfj7q2ep17LAvSDI/pBjWP0k3GqmiYe+qYPGC9nx7kQXjCS6xuly+TAmWDKsF1bypVkp133

31ZNuJkWXMrrhQxCDKo1QoDOsXadFY5Wy8lDHg1n+Y
WQuJE+xM805kBMGqY9orXGeHs2yxWIR87Kzi1tKByltoiWuMLIGBXXJzOuHpYk5lFxYa1Wg1SA7wH7fR

FWE1o8Pujxv/pwr2s4w5az4DIG7/wZERiDt2teou1nyDaSaKwGARwHH4y7hWK1+INhdaPrWfv0OJkalG

GQCqp1XOAeTyGH+bXHXfeZ+taS/d+6Jbd0WkQaZQKf
7Wv0NNOXNcmUwvl5dajDIHLxwGrK9Zj/cfuk+9fYTXheMakGIVcZhRIPUc/WrSviitOg/4i0LE49+43k

xEG6J7mtIbJ1j/j5ptXHDtEHDnXFev4rZjbWwdFB4kgmDb7I8HAAJzyJe96kF+PvjFtKyxOyLdQYCyTu

ZK5nbppekb1/rktBGjdc6KlR2o5e1so7Re5gqJ7pe2
YL7+/y0lxz+bWuEwMIsXraqtdiayqz+nFYxEvKzX2Sw5XO1o2vXhcRvHFeLEN7DjYfmpZ+16yZurNv3s

CHAWcdbyHSPu+NuHIC8uzYhrdu6CxR5jw2vd8yrqP74qLZeVGBj96hK2y/xSRbi7Y7MZ7SBfxqi5rCJp

d/GyEzD7iOWEi7TE9h/JMeBRxxWPdQlB2ND/xj0+Dm
DwdUStxKku1NiIEb+PooyOwlHKO1aoE2FBiSfwIbJIP1xkqfN0/4RPPTD+DNxy7dpy29J6BP5V2JXxmD

ecvNcaK7b5lSbf/HiuZXfz5t+O3aSyMuaBdu9iPDLJZOwO8Z/WRKIpgsfAKp2TozKKSf0r50uP6ioLVt

J5Of9lw/3zUuK/Flf1MydwsM490t/3bJ/ooqADNdDT
bQdGLE8bU+zTUYKZGdv26SIDlp8AoqCje7EcsGzstb3Q4Gsx3C5Y+mt5IamIDYSTpojCfAv4kkhzxC0P

SiQsAFKrv+AGKRpGUAMw11gAvfHEpxGgypaptTH9HC4BzIM++EHj0Y3GjpTtHkMnLyhzIBEZi9NhhkFM

5lXav58nfVTdaAkrV1pIxGhzxymT9jkjZwDkBCCw/h
w5gyU3kjR9IQoZL7hY7lQk8krZb+w2Iaz2Csd9OfZX9VMNjR9JUlIq0TkGyaHVq0ti6Q8Go5pkZzTjuE

VKOwI7d+SVObhgLfAhhJU1OZLax14w71ymsHGMEfSkxrr0rFEQ3+b/2CwoS8hbbf0Mg5pGjbLCy5fXA+

HUyefySSkSAhmEqGgr4fNJnI2xACtI6cTtbRn9wNqG
Y3antHMZgUqWmHcHlapKZ8OHUU0cKx9fC+mjZzUFEjmBqBbir0odUL+mI3G3fN3PJvaJmhrW/PocNecC

uD2XygKyStt1LOu/qTryBrmy3Hh4FvafvMSr8Zu2crmCXvVTma1lD8MsN2giJa6fOKflKissrzbUkdQD

Mx1Dw2SLSzx5jt7ct1FCKGNottDY2K2OppFQee1+f+
2ia0FpYmk70/Qo6052TqgU1gdtQxEM1WE7mU1VMvwlQVKBJ3XE++O69OrrNYClvZZba9iFMRB+CDx0ZR

NUmI8PVrXjaUT+MWPVB9tirvj0awAuJz5hLm39eb+HZaOlCVOjDzSBvVkAadocmh+hvZzWDHvwo53ixy

4vgN68lMaHhg6gcyD5Pe4d48buXcVqDggYC4nN1mrz
RN6phaNY0mTG2MfLl3uPkpYt3UvxmdI9TEqJrZSZ93VDZ6VUBacjDBXJ7ymdDkSCCdFYu93nRnspQdbo

ziaYNYXa9NwukisER0P7jML2bjVDTTRk2i8l4t1dC8dk2B+M0qJt1tGbyQIdIjERb1k2YlXNlpJ/DZva

kRsHSc3R7ti4CeRldLjjme5vf6FRNldhL0JhG6tGnU
YHcx3vwMCGoS10epkBW8/4pr4l2yDI9Ws8obliQtlL//j5xi3vV8+dgPB2v+jnQncAfy3j5qwPzR8m/Q

ri/dnjbMJC6WT8W1NIeQ1XbBQfL1DhCK0ffPEWwgnAbIYjFBknehOSw4hBkY58P/RNcNfuYxC35U9Yje

Iub1gLqq3pfDVz9EjxTSpwpNJ8uGQlDIEPkgzRmEk8
01553+wILrY7Muno9oESQk/s73xGT7m8ce7xeiDq63n9L9pvpJyU2ePof7FoKUqRpRYzAng+9cGnqVHt

2QsjT6W7GMOlhsegOyxlZhP3G8q6U3CHVe8KPRHCNVpRPf2IazHRNolQpAb5PPgeacK/QOzKxfdrS1ai

3m/Hlv4hxy3aa6iKhWGmv7xQCU/3T5AFT0t137QxTF
G3nifWOanXyUt5RxD8N89K2rWcqvWTBpmtKjqJMhpBOqUU70F4KG5eahIPiWTA6CuZ4BY+q0GVDzoekN

dDpOlxalc6epPRANrn9rvjASXt4AYnkSjjhZwStsAmH+/4i6tWtj4TSnRkN9vFr58EIKebSk1qTDbtvP

fkKF4oaI37Z3vebJDXffSSTUJrxg2frNGWI79odvcT
FLsQwao3XB25MnQZuyf2kB7oxXe8F88lfwYlQMkKduMY+kDajLPkwWemOJcog0WT04PlLaFwjXOU8WWE

DTcMnriGAN6Ko1+KOQ4q5XE/LwcHWdfPdbyKBZbgGgTaAOQx4xyMl17qFU6FTXOiCP8hkp5wfVLBVNpV

zvoyQNCSDwEDVv0IBPmusR91h4lPSZ50Ws25Hay+M0
3SjJIaKup1zFcbXuGyi+/oCFeOkuAqST+ZzN06GuzTdRzSBv2s5dk1pwEochVld/iW374tgcdcBNVx9g

AV/BYUhjkQU8s5f2j4jqb9m9N3b3VhrQvjSAsexISybkcznn0EH8MtbP2Ryz4I/940IOJP5JmetbqoqH

Tr0VVygvdImkEzemkMwcI8vZbjz0fgl76qlyv6niK0
pdLm8PiP5VnqivmVzkJDfz0SWdcVg54e1gztVAVMw+NSRrih4KyLQsPg8MuKZOD0gbuiQECwI9gEXZL+

/BEI94/iNkqdEjyZt4xq4NHRf+z1/tGZoyPktGO+XE9s35Z1Smhf7DeWBNlIArxY4IjZDhH/DsUpO17s

CBqhZbDNL0ViBRwo2C6md13ssuEGUlaF4JKDXHUNJ7
wgzlGnEiE5vOsrRFl8Bl3dMB1Z2iwPh0seCxCzzMohF4btlMXChxhhldJJD13Z4xvavaCOUpaanCtGnx

myEC83yy8lI0GQRSWjK8GTfIJGy/99ht7DkZnfx11QXlKXhzrtwNWPNGaOe8zbhznoJDLjvBXLxNBxYO

WeIRJ+bT3umPjBRx3Fm0NE1OPjNcK7lX6lwaySy6NT
N34XCNPNQKoGtdynZpsPeADtj9Ew9rpy/ADrgTv629cCQe+TvFf28ZAsabzhPDLMROIiAgMRwLbwlohK

Oi5irZWZlFkfEKxKAY39QRx26BOtO1VDz9yjhTpHj6uMixYbLuYLs0DvFC4LdZ55rld0JD/o9U/XNP5s

CLcLSYfBxtwVFwqkVsxkcxlCeo9ugTkK+KacYvGZfN
2VWEEmvjJDAsVbhHykXDdU86v0Uu1e1TJ4H0sBgg+9n4DSuaiPLR/4TXUG+wHRl575SeEzxVrJvPQLEk

t7w+qcdhczpUxl9sb8a/T+Ww1Jsg0tGHseBRWuPP0ck2axqOBH3MlhgeifOAwpjHk5fIKgSbZWjXXgtO

SsKeucSAxIS3kLVSxhk3QYv5RJDnPWSE4WInHnFt6y
8/8VA30Rk0U1t8OpIRBpwd2mvOVcANoktnMy+UWfoyUBm6z8/ivutlNwdyX7kbeVGIiqzPFMc0ud4S2P

8jJf1rSgAxmkvHaRhp8SsMnJ1sBVaN0cGToR8L2G5nDdI4N+1aPWl5tVRZhYXRajgYZKId2nyjRs5pGL

Z4tHJP9k6k2k9B+RtYH2Eo+UYNu2ynW/tblMSjrm3q
Pyk0nRj4+T/96QJxVdpp2DT8Jw3tOuOuLWDzjO/xwIyT0k7ldPFGFXBxJktIcWSXQd6hgl6H3VpiHaDb

3CyqmDzrylkz0qnrbp4okNggSygFxNKbIKDdRAHNOhjBzM1aRkmoZYqYoqwfu/mrSpi0PIcE6DiP+QZw

x7ElIQqWGLMLuf1yeCw/NPqY/4bY2HCY8QhQDliFjQ
AY8eki3j7l4Ih/K9p617jrPb6wnPlbZwBnxqr0dw0P6O/8Pfc/KyheD3L3iiCbGSlnQfH/67anaU8zsC

sVB8PSNS2j0OUT9Y0qwDcjJouq9niSf49IuTBCCTdtS65wWQ+pG83DGMXqzXwDeLPah9ZLvYQCj/PdNv

nUV/l7wPC5ycSIOndkw1q7jJUidR1bA/GzADWVsmWb
bPzJ7tyDpa5cTEWf10iYERirEwcXhgn+MyCWd/wr2il/XKL8oio3Iq9D3X0ZCUmD0ri6N6kIBcYjk2L2

FS6CqaSxW025z7+QoBbvFxkZtsrEgkB4Als8xjhkBgGrFr7K6TvzyGKy9p8TqOHyCsvveseMzWoD7p5k

xljht23RPxmC+zkpaIURMYXzrFPcBAb4StyzbZuiRA
zaqnOqY1s5HkPUk7uV6icdzRgOsFhFkzK+C/wXeK6gDORkolp1QR2rDQnDNrc1D/oC6Pz1I8u9/JJ8uH

LH9lVt/KyFBbBpOMJ9iddx9lC5bP9gZpOIwTZ+nkYOAFiZbQU5iNNu17ofSZuEE1AR6G3vFRsRHh/wqM

Kkswr47jAUiUrL2doK3qasge6n6ySDfyEVhrEPAL6D
9egKNhaa/c9b1HYmnTLHl/eRtMuFosxd/5YAAKxduWy1uZcJURgtoqs9g2Lvbhy4UKAEs1+Hj7h6Wt2C

pTM+WZuB5tOoPmjrbqfiqrbsT13yZHFCtrz1YXxMiuOvgAUN8Tomkk0zonpk8RrhfnlJjDvUsJYY5viM

5eVFwC4uAV+vMvXyskRcwMLwBk9MDpVi3ATNLL3Jlz
+3AUf3hU5UADduSRstSzqs2psPpi8n0SjwLFyBUU4pkHQEoHvp2BKfeW3MGLbhgzFZRhbnoan8Sxm0vG

KdqWbQvk8gOzj29Abms6DRr++/fU00vTptTOb7K48MH1oZemXby/ET866dJs3AJn24MeTdTaZHKVvH2O

OSkVoYc01q4ZRGJosRRd6zQQoEAC/MJUdcitj7/h0I
BJCaILO+vjfN8Z4X3RvyF0viN754HWeGg7waUfTQStk4+O6B5znrWVuJOut5LPp9GJxyIJuTgKnAcfuA

kDlOITTN4ZNfO1pObBv3IEEY0uQIoPR5v9b8sd9axlxKL8t6b5AhsBeE96KrcZe/bLJOKwa0nEI0XLz6

CvI+adSBY9TBjxgipzz6CP3BCliIL6SSi67YBW+k8C
O4UXMbzqG8kWFjwXyLBgZ77ObdWs9JHYRP96bLzpN32WUqdPPYUNK80G+H6nRInA2bz6TSfAx68GX5Y5

CZShs8ZOApIc1ouDzzR6jvlJHJcZ7vFkv673Uee+fa5YxbVw61lzeEocf7f+7fSXSKxvZaODYNT4hIJ4

/KMXJoiH9+5FUdqJXQXdseSCo9vjjnqK+Vh5XnmWaq
H76SIwDFrL6Gjbxs4Ey6Wq+lTAlddZjCa+E85znePhYlZNu6eXpoJBoAJSDH9E8DQdTW8k19ktijGQCu

idwV4puBqWb1lGr2HotHfFj0eliqkb/GQWUWXRcwfMNQZM90rOJMc56Cs/oxI2OdMQL2pguxc+tC75Jt

H/f6HCNyOApibCT17mmgI1PSse8mo45cUDsEvsTrdo
+TFycIiz3sQZGZKfMw/OYAJzphhfhP6Nh7Mw7j0cAoiziroXC199FGNkllQgTVI++2mGNwWjzBpr6j2G

CwcPELPNTLjUgZp2ZLx2mGXqm+Zoe4qvOLUkiGRYJQ1RGGB6SK/RrzN37Eg7Qk3ugNQTtD7vb7Sl7SHM

hHgqjouD90VAjB0D2ryHFmo1k9p8QVDy9pqgK9+MV1
emv63vm41jpx4jEcKSdq10NUZG+PZAgOI7FMQUP1XAki0ZolA8aYmnAwQ+xa55zuKzbbviF+IKmuFz0M

IQWWLnJa6HUV82QuA6+gd5FR+sZF22L2qQVBXc2fzj0XS0Kc9GQAhKleV5cJIMJMzVbETOXEU9dyOQqr

fG2fUKKMfVTU7sZF4clPTELTNr4bb0tDPfb9Yc+cvr
EmBhzuagUeC/BHRjGSwvONuSXQSgNpRUgHRDMREt9Oc7tQ3n9rqD7Mk4r3AVd0APRhc9qbRBtmeUOeiG

DXOxFGF/DAUadO3wrtZ1SG73w6Bxr6HcOb645mo4Rscb6MK4AoPo1aUzslLbQoFGtS/tgOc4I12QW9WF

4n+VynwDs2Qbc0oNz0AlwDsZRMZ6NqDc0mGR+pZv2M
dhRV6CON1NW5QiGnI2VQ+533/vvKN2lW7AzzaL0ymS2BdUBkgTCps6wn40HSGQTD+wO8j8+7EVF3g7Qx

qjcGmtX0f1jX5WhhG74J8P9us3oPWXIm7HU0vGKmNLA7VjpKT6SwPC8/p66vuE7gX68TEG974UExFYQL

mkSJ4UcEkI6YvAuqYfx2X1lO9G/w7hKTWBsUrq551H
Ge7nNwdO8ijnkoW5t2VP7zi8mAjczjjyxchFLtKMjXgN6/a25P6Fn9sK605ikb5+75iFGgC1BYG0qyoM

ce4a8JEbVX8wgc0NAgANlZSO93BopepfHY+NJn0gmaR+gr4jRiNJlcS0Jh6i8HACpb5kbFY74qbRRvcT

O/P6GgfIXFKFgkFKqBWM9SiiPC8DuBZvo0ESceOrDg
xUQcsElVOHlbdDTLAbkXh+VWEVgZYFOVaipLGbUPGBcUulyaWiGdSe10u/q6hxarioUNVLFY7rJVdh9z

eAS3NO9bE6Ws3ho59LXgKyI/t7EXkZoAf4lxaBcMqHCU2MzSMPih5QwTTCw6WG8kbxHPURD2SNg/WgrG

Xx1G7N07rFOCyytgs/E808eWAm6wg3Qme2KR6XwLHE
YthW7eHK1WVdZBTnZ9e9oS/qb9fLn8LeCljky70CT4EukZawfKLTPjLDY+tgFoxTJF0OwcEVB0M/WdcX

LWvNbdaFrV0f74+I5+BVOZ94KF2OA8VfFYbXdgXw8e9m3Zr8YqII+fJesUsHvmCO71vk5jjLLAewNZL5

+t27YC2egtxTRhD3tLfZ8m7ao6WgTLW6Xi/WCg3HNS
XVsUBiUPr7M6Xf7kgRK2uB1DMyEqR81J+/xvr67W/aljssWWkQg91nMZSdnA0DaFQ/WtUvxn1FTP7E0t

l1DGASiux8SCAEz+Q5/Z9PZ3cCkU52OlldTyinPoborxOOrRvpVp4LHMbv8Bem6OMZ+NizgIDh1IdCMk

Jmp4CM+qyQ3nBaCvuIsl8rqskXQZUWXuFKsVWB4Apn
SYtUll0ZoNuCizwO40CPRmTEwfOIUL5LlaOt4cjNepJTRPeuOXZEQTULseE3K9htBVSR93wjbeRPaeuo

shXAFz0nUVq1A4tEUS0v3t8wA5x+GCYsUS6xkWe0qfeXLlVx5SBYtpq9t3u/qqPF8lZBSYe2XxD9s+Pz

a8t0ijbtxk60QZvW4jWou1ohXA+Ir+nYM3HNvXfqo7
wSFN5HoWZZ9a9iQCDCoQDIVoTY8rpjm2ZOURjmJ6TGaxtcV0pdTl+pn522KCDpVWMvULN3/j6/HDcMuv

f6fyYqQUCs5WNr/6gvJbcyLio8ioT6hHU2gKONshjxpQDY28o1Ay68wRqbDyN8hqjiPLSSXItsqpHoUs

nUKehZrgtEDuO3F8sQejuK/fSD7zTdB3TLOiqGPAyl
YYpL6LNyw3TzNh6K7dsH9ucrCkrJJVQw/yohi5In0fQouePu9qmkaQgnKI2OhPS38DKBza/fGo2BoD8v

EBB26I2/rS6sJzKhwOsIxmWE2gd/ntV5docQtlvpAfetlHiwEFEaxJfKRJbsRMg7kV1Vzw9Bdb1HVDwp

98ah/O9646REYODilggwXs4B1gvyu7vAdZ991BZdTI
qwJ5Tuj2PF7TWxhBQ0E4cK3e2WKMDDnz/4/0Im5zkc4danyDErRu6X3HAFpdkhdb7cEu88rh+NIF2ESr

AS+eXsSq+8gq7d/6sT1d/B25l9vRbVtCUyxVCoo3zdQn2HnH+XSm4lRaFS/Y/nFotevn7zlUbrjaOhXm

G174rW7It7rsqTi544RxUQNOXI9FY/bwxSVPf7NQRw
ypt6QpvQzE6rNhVhu/13k2+oD3LSXHbJWBeHgDKiHHK7jKY7Df9VBOeOQMHQEcxHTWT2Y8S90kW+gQ5b

Xv8sqkx5lIj0NJc1+2iY3pnjPRjqm4uwLhP6fCjZ3YxWkhbs/CY8l0Jw5zjMEypxhdjmdILT5wyIYDX+

vcug+868dVnGmEe78ByGT8pPXMEt3vdefCTNuO4gEu
GLH1/9kA8YgQdyjzlvGVBFy2RG8rm7AoKuvIqqzMOoWK7K+ayM1QjAghRT6HObHeFusJ9AlNOEAQeRZS

k5eJ9XxO3gOFNwf7Cm9UmgwsRUT6Eja6anKQRZifnKnKQH7b+Jfeg2nOi15I0+5f6GnH/pHG+2TRKYGq

LfOpqfevG4PCX+R4WT/R2aQpZIhJwjPi9CHGyJjaJl
hkP9oOpiibClkqLOY/53Cu4WUsFLi4N9HxddbAABVDeFE9c0eGDyADwHPqKs+eolB9GvNo8a/mDo89ve

cmtMhNXpSq2TukGtyoHgJPqv2S24abgKLnPRJkokAQhZAS5zert3ZzKhpgstfrfYokqTV35If1aamUkt

fEZJ9o8vD5auhmSvKzlN7Y1mpa5fiL7KjY4Fe5kvfv
9KTJf13HUHutr79HgVhViZAsxI55h82CciVkGyjdIMRKTWvFblcAzHFDnxRXgiDNEUmkctnC9fVZNg07

oWezhS9NfPwgzmHC8WARFo7+yoJS+7P+EBJTHEH7+ORajEhWzfN6PNSz+nvhF6rSsezKgMJILM4nrnNi

sjvPNc+1I+8Y7zC6o1uxoXYBNa4DZF+92uh2a5ZJiU
Mc3WaOZir4vgThvvRw45AukcfLLe+PdLMnhqHRFWSmpFLHU7KkQYW726oEJP1qFwS2N8vXxAD92/k8rf

GVFwOs/v10ucHGkIPDphPCzQmQOvSUK3+8gt0sIace+VDgjltueWlMMz2GblMoQnyOESxL4B2DRzPpe1

c7Fot2aU78ng/Ei+4YBPQBRD+JK6+FMWWSnitN3UmL
YxzO+exQfjt4bs5DPxCrxuvfXaonR3zXc3E7xq8XLutamg8GshdeZN+bop7IdafaywYP4D5Sl1u+LOtf

1jV43BYMrb5oAriFRm3IxCvkhVujOL50EyiKkmoT0Ygh5YR+OuyYbQq7tnN64Fyv0sRmThjSMcgRJuk6

Lyjm31koIfy0Iqk3J9lQAaZ7Aa/hh0U50VH+HkdKxT
FDkbWZQY/HxORrnb3WSuiG4zSGsZUsoaRzChuBMOfCfzknwCevkQJwTfDroJ3jXlFfLtqDjEoNp9+yO9

mY/XLM1QlgpU3U7KNzAVy5mV+8fQmIbAunKpWoiVjUxgpnRZfwulARKMlF3B2afA47hH3zBwdkASuYqi

9/4X04gcg8mDBSZOCN/ow+23kW/piyJhCkjcaif6qZ
o3XcZ0jops7cYZlV0WG9P24cEpaifo2+houbqR8uXg6LyrWqHWXrjgUHRI9v+oiX1PLvAhGjL63zA8Oj

sJPEaq49iudR6OGZ/1A/ognwM5L/Z+IwR18dC21M0VPYthr9zrcXMbh9Fu30jelBJ2ZBtGNyQglewu/Y

DmZN9oLL/oMz40diDvTuC57BOJts2cyvWFDCN5u2aF
peyeuiU+dXvUor/+LeMLm7Cj6uUiU9aOUxp+COsMtdJu3U5SqeVyL0IyFoOXypxTqMqCGDyPMTGRQFTc

hR9jUvDo2Co2BbouMFzqtuHlX0js0+Mj+86uu+Z8Ujo0mXSeHKIIOZVUC0jxyzCvrwe8ZCTOVYNoxgPz

cxmzgaKCM0PVgzzHK+DiPk7lurLzFa8TWB5NAiPITT
9mU1MpFoRISANC1oBS38ggD41Wnh6g4WQeM9cU7IFVVPkg1dZicljS0w0RLwLZ01Ug9Xo+XK0xvkHdaf

OiI72mplw2mIXxJTdU36PGxuSNrtvffeYobNYk8nxrYqCSgTg5Cx78Tzc9bSNNbNc7NRowVfaRQIJG5M

AoAODWuNvUNCLxtj8YkOa4ZWQbrRwpEP7rezE5qoe/
TjsR2H5vB4G1AEYuMbojWA0sv4stlmZateqpUnD6ZJjGiMCPQZUmRJZVn/RCtKbZFkM9Xg7bI7QDj467

tfXwptw7EZ4HioNWkw4eMnpY6T467no/KV1TNGpu9s7ZlHlouC0Y4h8FktdKvQyC8uvwcI28cU78QJp2

ZlDW/oaCLN25RbPUi2cq9mA3r6jjkzxfRK8JBc8f9h
IOuJA+yH74N0PisxEJwL0JHv44RbTyyPBB02KOXxG4Ol4CTS9LTnjnKIfj+bYF4/sYoCLSEWsFLHiHxh

MFmQbxTHbNb4KDLAsbNHTQ3+xXr90Sv2yp6xTlqD3HaV9iQLNrLMvG1F+q03B5XNK4u46aOuu6xVIbXB

oXsdkRzEk581LBuUhwea7JQVShg2/gNTmaRefQO686
v/7jKm/keCXryBnrcYHzq7psGMdnhdz0cTTwurljVskX4LoyzEiPhsLN4rhf1/GBT1Lqbx6Y6blQPFhr

uzYs6ofwRNXpzvZy8dbiHP+bArLFEdJrZBMu+lRr9EVMzSfGbbSd/gDWOhDiVZTwA3I45nLQPhA5OmmB

gelL/jT3QKd8ZgyhPGzr8nxm2rw5SXE/xtU+PklSUe
IsspbPntzkTMZQxOkPSL/AYMH9TYYHFURy3bJ+sXn9ww+Iee9RwhEihOxrS6NchX6/j3TNO3SkR7qU9B

/Pt0gX9Kzj/9bLDU38AKqLG0k/Ncf5JGHmAHN8Czno+woD3zl/RhTeba92fESWXGqfAszUesUNrbkBbo

lINBLK6HMw6SUfHRbC1EsmTbVb26uev77O/CYft7XL
8tP02ua1uAYfwB12SdCPal/jvVp84oGJHSbKhY7ZBWM8TatlE+/ugKH7JW++OByZyqsLiHfBuciJlrM5

PJ5ysLSehGZ5GKqGLFpo6zmrRsC7T0DrHoe2X50MX2V4oyy7rdWe401n6dWTQuuPAyr5H1lzEHDYZl8s

eY7xpf2SHbgaeKreqW3bmP1Kz0fuwNQXanRL90BchD
twQtQZnRrTUzFO0nhEqIPHPNnykcw3ntJWI93e85yRmUAqva91O/X6Hl5O3zDTCQ433LUniNZ053jCKx

rvj12BSWhl75pWMJvY1jUd5lXPJIwqUO7/MbmJqV7DzfBm998w0RlSm6X0tx3adxpcM9I66USDQpFjro

/aaahVoM6NtwBZ3/TaNvMmpiyfCm1Rz+Myl3YWysxr
/SF1YNwzjA4tGQqnsd6gABa0gqxU489AgYCf7JqkedewCMS5MqOwJpYcIYjoQv1OsoMyjFAs9q9Dgpuq

i39Ua/qN8ppivsb548Nq+NsdGgJ5KDduueNuvw6DizZUEB5dzuRg9c6c5UmVnmFc/DA4Hks77azdUbW5

p6fDOMcihLvNQp5o/3llGqCe4wmowTPqchzQD4G56B
4hs8UBWhANojaJFXTF/o6Kcqf4nPmt8HFm+GTnHLgmEXJQwHW593ifUwW3ztTJOoVFZYHrEw1OjqaYp/

qOy1N85AzFwwXAReisdjcNXZpD3VhSEHDOzcFSlqG48kPPpxSWipzhYEKTqfKSLExLAGlmrDMCuXydmc

mah4oR/O+lPCti4bZRI5IX4OAgwlbm7Q467hihwAF0
GRHZCMbpz8H/yVqioNQ/xesQmRRqjI/v8pIbLdu/SC0HKnyiKgE7v9MUYgVoAGSsfARNme4wdDLP84R6

PmpEuN+BT2xZcJ1YGoDY+entJ50ifOyxRnqqHJKaQ7Ee6caHQ1VM3nhV6CEZ0Tk66K2yzxPYnaH206SY

IxpkH9O84kpUZ+UIu5RYhJDH0RoXH0iVlWPs5o8tvD
FrNV90X9iYKBwCq9+m4K+8Ha3Zx8XWrEG1rfkBAmkNTaV2ctGOdWYS5ozkfde7wydSt2byLHFlgPtcad

53MA+3D8xOCt73KljSZUsr7aAjU0Bzpo+DT/yZ7vdQeLPAt9cECoNnhXy+Xgj2N4BlBAtC20GeiLqoe6

+eLi8SPYvkZKs95mn0O7LkJ25MN6b4iQEAGURA4Zm9
H4TSmONTqP9t2RylSc4JAT2vS3HvHH+/wTpFfrK2cs6Zwxt11Tw8BqrOkasQ+teKbd9JM/bPa4S49p8z

5L+JvLNnZUV3c4n3QhNOObsmUjoQrtPh3VhX8wxlWOlayAHEhFWxiCi7kbtnfbftBF51JaKK2+G+IEeI

aOy3poDnUoIwBSWD4/quGcqVir7qge8Dq4APuuobGZ
QwgOudoQWw7ej2MvSMf6Ge3Uv+2TOsnaJnk10oPiYWNgZaeE3QJnilEHdlFpj+sWaEow1NC5/q2MBEfR

jR1v8vgV7ypiVyeYMeWnhnd02OdjxtiNXi7xgVEyvDMYMQQF/cMIfFOoAlsiYBKGlvmfl5vRN1B7r6YB

UkEdR5XLPuqkjbKmED2i/OoNhTYLjUwCxhjoks4KG2
n44/tOnhMJE9KGsx35dz87yMd2pJFwTwsVFeg0LEoepyolkr2JbhC9g7b/jdeh673Kb4k4zo/rWouCL6

IVMcXIHfYOex7sm4Ylismhk1tNFltlg15uAbY6elWFzbd+YSsferJaI54t5jlTvBPU+wCogmx4y8p7CS

yLtWqlWuz1JdP6616s3VlAhuWYziS4OIorU6vCb3li
djcjXcEImrqQpQ4W0xVbRJToyrfHp0xAvrtDcql9DdWR4IrAIZjT0HzRVD2TbAOu7b1BfpxeWE9QIhV7

fBi+FSI+3piLIvTXofbd2ziItUkeMwfi/qgT90me0z4SWYqJVlYliDKPG4BsnJWkEPuhe11mnvTUj//i

u8aXIMCFKyZ40Kcj+Dlj4JsQCigK1x46DTcrKE9x9c
3sXDNBN3s87OECSE92PCc69T+kPjtZ+me/q5TwtuVMnz54Vr07Fy3V9zYdduHPXfCxyVm8TK8t/g07FN

Ddyk0TNE8oDhOUU8z/uiBDQx98kTFxxaSY3yfD3132qhaDPnUZDGaDJYpy/BKGOEpoqZ3vqs7yjhIlmd

7zURs+CTl/KCdO5hPHcxtCR/t9e+0NZi4Ir7fBTALs
gQmnOwpqBHm7G7hstDFfmfBaLb7xg0e+jhS80K+7aL3VSxXUA05EQLKfaIKZ4gDyMGmavPPFLakz2xAs

WHMizpbme6cyzmRSvleyXmIROQcULZ9hhBqne6IUEwNez6Gd8ukg/V3WhJeqCfym9hzgY2gcfVanpCmr

lgdZjRnwuNdotveroOHt6kNevP7Rkh+4liVD+istSY
tGa0LgEF426slTDOnufvh71GRAFy5TV+riBSAq5szPWhAKUSSkNPZoX12H1NpIqbwXSI9lFK4tDqOjHM

shYrM+D8vIP243gLc0ZnWLwqwU35kIAjqZxhBFLdhs9TU/7/nUPj0hrtf8ZxnJP4KaM5R16BEPl/O25w

2nHOQ+0wpfDao0y4G6QOe1oE4XTflmsFSmVBQM5z2e
azXIXh4xDMPRC55AmeLYQAvbYpw4YjphSYK+WglZ/j+yLtd5D4XDwUATxXw6O3XYVMLdcECeM6sOYRdm

BDe3wqCBJ1Wyc3eTrb02nTiiUQH8zVKo9fREP4QFiRDzhpXNw/sYzlzqfTk/usc4THfu6GpdVhFGDdiE

pBfC9/Zd23FmQ7jODORrsPsaKyN7skfmLV1BomFDvk
cbIdfvWn2k3MzfWeJtdU57UNEljay//quycnckDII80BFRdi7MIpbARnkEfqeyUVaIAkyWnq9pwAk1AA

yQ97r7ulKKRAurcsIwavbYMFoTR2na7yKb6yrd6grMNRBucL55bnVPCVxOivlLKhjuVbzEzSLgfA82XF

TmNDCV2U58GahyCw2Ex/R9jI28N8DfJnIeNnRYLAFs
AGd5j6K1fiYyRV9LVfSkMyrHqKJUF+t//jscwKm/96Mdb1mUhBlL/uuIsMtLuND/4i4JmccEB/fU1lgE

/dRnCrFTLyzSTYEEkmV+kj3AUV2AqB3Cp0G5tT9i6lQdoDLW3bcrNFxU5dg3AR9Vg24x5aw9gza0oyjw

Rq+nIomAwJJbUX/r4sa1rrub99G52mycHh9X1oxtdd
Vpxoo/zSQ7NAjSdQix5+ejpY+74S6Ihi0Q060S//80hyGVJLKI4H3KATQ390sF9FZ9Eqzoz1v8V0Pfw+

eQOVim/n1Rp6RBOchTlxyyC4lZeO3OZf/0bKJBLs4Rts+MOYpYJoWJcUJBPxVRZKV7NPJXQNEwNpPW+D

Ob/UY75BGm66c6QtWvcp5LhUa2XvJ++EiY2RJdpFaO
dZmgSkzczfjI6e2UkpzbeMoiFPoYVECF8IKu6aW9Emx5+2c8c6OqWqL8m2OGkAEX7+ejh18YA0urhCGe

KfLbSv0sbqXBrUS4SB9xChnmd056DmY0u+4Wgv2kAW0H6Lzh0qE/KmaBbgtusL5ydb6NplausOSaOyka

euAF7GDEh4eHKlYUBcnmsNpQzwImZIYmxAXJbfwuDF
uF0z3Ch+yrrnAJoFMYDKt0Ouu2YSlwE+OF8sxVsTUcm4gqg3l9aMmzGoy4K8juYm50n4aGIjaXwsA+62

Mu5eb5cCbwuIibatf/mHDcsLFTA/bgOtGhq5jCGaLEVUlzx6uSXyjCWA9n5APmsdrZ0poLXkPgeFPzie

PgQ9vMyC3f6bm9+vSIjhNvwaBoCEzrPC7FdGW7c2fh
Pm1x6tgPR8gn/xlJbr0b0Jr3XUc8hDaLJSdoj796jgWtyrRNgr4dgZbtkOmGWkGJ7mWTycvKnB3TxbHe

wkccNko1FD0n2z442t++Y45Fpv1Ug83l+ak/sqGMgutOSnyNHunX4Kubi/55z3x6UW8/v7ditGQFavRK

eKqBOQ/90rQdeg/7eCG1GTkdriM4JJCUydzp5/rTn5
uUZzjaMgqi/aAS6rrVYgwfLtNHpzp8DT911/d4deYaOeBMqsLR1HDyU8VfR48Od3sahescJlgN47EBch

aoCqcsk3oVoKgw3zU9yEmlCel7p8hJULy477lwyHFwx0e38zz2mfZ6l/5lxu5RHkOc3J8lLuAR5/5Kj0

152uzIBgMpg17CsXjuggNeyuq3Vy+y36uLIuowEUst
UoFShlex5/V9N+6X25PKFemDvjSghJzMlYTjfxs7pY8P5NU9pUBLfPS0tZlCCeRs9gYmMHLk6qeLbVtv

zPhcBApqxG9mSMeQHj39FgY7H4jqKpOGXs6vY6vOJOik5tDLNH/gnbbHKbviWmjtmDMxJTfoNUdfFZdR

AXiTqH1Ky65FbFmEfP6sw+K47a/XS+GbF9H1Q6i9tV
aqeK0a9xVrR2otxMWjNTyIhE/yMZMlZdTDTVEmwSjdbbdhhs1SM93BhxGDgECh5Fj0DifdnAljvEi72/

JHjbwFeMqDeutSKowsUON8nq9Fwcivj2IZwaYV2biuSdG5mY7ABxBRFNSZsxUlAWHDKVxcWyYFWtJYdF

QDbxkhkMmFQ7cdbThVfeWp7yf3i1xMxiMp1+6/CdnH
x4G+VDcE974ydmUA4O/3fG+Zc5bvchH2P9WCDzQ1y15iZYd/7cx4/avvD6YuOiOVmHTJguhB5vbyi9d6

6CtPbFfCX4YPrSlSw9O5JIvyllggdWXkaz0j8J/jLeGlWwvsu6Ds23A4HLw66V1bptEAP7e1A09G+0FR

xV9cOtWg4EsQ48RP/tOhIe4LMBOhg6/PaOTMzbzHxm
X8+trQmwahLe/OgoiIprZOWRKQK1XhoATCCDJ1eel0vefo52RMi86qd3tEhYP52kywBElv5Gkqxr4xCS

MYAlp49yQjzPnQWii6NpNPM8+LhECOEw1FOIEEUrbwo5khVuE2EF+wsPk2f8cxzzvXeHbiUi7PHpepLv

0rzc7Pc7WH2NgKrNJlDARw5ran9fuZfmXWyt+IgcnB
D9sS0L24ksxavbwMkVc3DfITg78lWcCL9hGlptMSlR5oc6DOMXTGYPcTQCAKXDRFAPOOcbglDhFAOqKv

hnwZrleVrmLdUe0+V4nAx8V6nuQCR4Zyw48mV3+Ryu9cGBSD/cu3nEtFxRFjSRc+8BAvoumhb1PKHG2d

O8RM9virHxDpp7pIeDk2qwO4kewF/sr4rsnqtIt0jK
+D7afjDlg2fRPbQMFVm/+NjKiM+wuO/nURSENwLrhxybnUWK3oiLnSKjNUuan/NJCcouPvQ6ixjZwsTC

jTewua83yZzRKZQyMDPnU7eHMTnCHhnDf8HjCESFyPkzpSVW1zXKtTbNmnxhhIxmEZTtWrXCDDQ/D/yn

mTQzpQ5+jG58O+s2pj+ZphzXwcshAUCLarelcDC6xT
kexjJdq0/IUc8BIMIlkXCQg3n1D43v7I2do4xb1EyX3UfZRAmGT90uzmF5iKhvkV3h+wYu4oDfLVASyc

4WqwkFhM3LtzFEITQWbXNVjo3IQz5FoLzFZ7IznVRO2snyRPnRB02FP9jRX0a6FjIZYKXIM6Q9JmQUL3

AyHM4vLaj1lHX8AMoxQGpVzb1THDawwYWC2ur+wtmQ
7v4sQzDvdNwqeSFkbSjLjW3U+yu0iVAvrBv7/MTdGjtEwV2kZjkzIiempjYX/i6myKcxhxZo0U9YL9Y3

9HwP11JuOUiAh8z6GWwiGPhTvd6uVnwHtiw0Rki1VovxKPCOr7K8HMSjBC5H06QkylKB3sFQpvTJEayk

G1iFNQkp+nJtf50p5RBIHtn6rnj5W5VdjlJtHgo3nz
dKewiMOpjn7RYzvlUgJaQcCyabwYKljHt076MyJb/+ifwC4BuNA5st0dtVw7TgMvzhooc50V+MVUzENn

Jr3bpB+J8hnAEfG1avUBxVIef8kmSEiBxXhhaRVmujTlT5JSLHGFVD8ALy+3wF/op1/1/GTERHrVmmf5

JeFzlm4z47jmIegKdBVNzS1fMEjmbG8g2r3bbEF8r/
JJ//ftIx4HdWj3nKdYfgY6Y1zMhIci6gbeDtiTzSGwctiK4fhzlp8gvdylBzKorrl/kwCA1wHgw12ApB

4DMQ1wyEAReam0ZoG/OHU21tFcMWoXjJk1k0M+oN5SLk3HvRu68oMKs2te0F6N8zqj4VupjUI63VUUKU

gvRJ2Btc/XHDapJExFoanb10NZZH8fiwFev58xoSqc
YqBnu0alRwVAzM1OIWzGQ+U9yEtX2lKcas4g/nT25KPLeH2XVxQzdIDNGqLOMKHUWj9pIe4Nk5Y8pC3p

zspzw657N+A4nOE8PCeDX1iQxqMrieC5py/9Q+zzfyH90GleRj5UKnC/IsY0XpNsO8DkGgNCVwkAmKfM

iBXXC/MHzssG9k0p+fedPNOxLAHWX4StL8wqNlBx6j
OnRBPTYudkhTAURAm43G0x7sAauXtju2aBgywzS6QaKgFOWOfxvpDD9k2ZzvrrhjekPANEX14iIYz4ua

qFOsVf0Mn7GFBKgm5owScoz54LLvI5lC/TacnHhxLgiqMPXc/TlXtJ/zYCD5mCoHYO+9FHGCtmZCZDsn

Vak7pipw+OuW/qk/tV5uNHoWu3ADMobmLWavP/fYeE
Jf/Q9ZVRPefM6RfejkczMb7dDsv/UKbwYUzrW8rvKcBZ036OuAFMhmOZYi7qWhlftfJRpE/rgCjVz/Um

R6/vcjzznZ0fXa6gLRkDSXLI6i639O0+cOPxv+lV8nLM/893oHfT4fx7+woY5HYWiT6SAvGT9X7BASTG

/rHtL8NYoK5HTT1M/jy09zJmE+SCK/fAgaJaZBz/8j
+eWuqpcyH+3GcwZfCBBPsyhgAWHmya7aS18SJQznMgcgWOxL1L5bzsRP1FclRDnztLn0ZvcrnTlCpgzl

AkdvL03aHcp3BS21d4Z1dfgiY2MlwmuAQrqttY3zuU9M8umi0zAUHAgRTrKENrKJUS5/hBsJwEtPiDm+

7fYbcGJ1Wjlkrf9AbDvlF1+4+VRmH/8ksYr47uem8B
CcEX26xKmO1dDDcbK5KJd9k3WhzOal3yXPds9oRLZOxhS3O4HS0sSn3MshemNWer+wTRV8NFm+zb5ftv

NKb1P8BeMg+qnvOuyUPOA2VRgOxNLfkB4UYEKu8zFCZjRcoP1OkdK10hqoEEDS2Yps5Xht+x4tSI0vAW

4rtP1nHllHnz8Ls2FEVhiXOdZz0AfmcA/UNio3vA3H
bOMmviJqH8fc+sCo/x+zQZbGz/O3s8t4O4yXQCnrn/uQ2q05fKb4H63C3Z40T5c2vbunq/y1yV+b/LXJ

//xN/gLZTuKA11mkTP1rjOLhqnnNXSHpJTNtKYOXGUwtzqvu6RKwsICvffz4dAOngG7t33G563uITOFT

HnK85dbgH0xo5e7Kz9I6DT0RQuxlSl81RkxkSsxc7A
fN50G4vSpuJJxR+z1NWYcz1Cv7lX0pHjDm/nlzfTCnq2+cQO+9bxVSIJn3CTJcylJRw+YhZomGa8H1lI

93bkZJ90ytcxvapPb94E4PqdbgvZyGZ8bq/OvAw39sVbLuTO8hgCa2EeHc9aLaA3F3k6puN/nMNTclwO

Mt/sDQIja80v95j8p2zumJEx8gtnli/ZpSacvGvbhO
1OnYAWHxnof+KITu3dRn7Lkt9tk+2SdM1u+XGN0tlk22u7W2WQtKXNl1CpgpTYZPoz99wlXkXJEgmajl

Zs8duYAzdR07UhKKmvoFLC1bUOMNknC9ZW2DMjap3tXitYNzmHJlRKAXZSTHsTLfTKQAd1mz827HZlIw

O6Eq4GCJH2/74NomkvyzIj1irl3bVGgK9+PFFQb3ph
OX1vjAGZ0qPoR2kmulLWPGTN4q8dpBZLrHybuMrkl7MpwEuQBs9Ixh20n960LxIkGzjaKEeA3DosQmGk

YXP+2tGRDYvH+UAqk36LDXiH+zMElbQSHqpCtgG0nVs4df/DHIr7Dfi/gz3cnFhyzTa+doIGoXCmGLE0

3/XmzCJEHTy1bQrGE3Ac/jL0gmAswC/X7qW25ajzKM
kXRmNoQIX669/U8pq9bNy8oKs+8w491u0Pov7yDTT44Ix+OrT3LJ09EZghVd7D90NqKsd9Jbv9koI7Y+

eBQfjllzTsFpKhb1Y0eOzgi86xneKcvUU/L1xMADUziks1332oMhhqS+nKHI5k4z8wUgeC//Ibi7alPQ

x9/YrPeiXoOcr5q9oBrXltts6xgaUgtEhbn1fKFKsK
WNHwc5lk5wH7cYTpgvFEYKn4diY0M4X5BcFp+lql/BNULwYZy7kPVtJCXZ5t8fdOtzlFy6bm+sWckFZk

uBjcp6OSUFx9rMeHOHhZfQJnQ6/sN0lY48iFzh0F3PFKr2pI1rXCqXN51YYAWhyG1klLJU9JBVKvWGhx

DQfv9uXFxg5YDBqOIdbKROnVldc/Pg1F/a7zd7I+2v
86MvYAm9hC3LGNDCwVjT1zqsHP2IabSsfiM98aIfB8PqkvTIru0O3P5LVFkn1DeQqlnYTOWStX8VIO8z

Vmh8bPPq0PRlubdelghVutZzFsmlRHxfUwBQE6kKRoEpr4ATjswAimQV3au7Rabn/NtHTfWxGWy2qMGI

H9XV3vNDrGYuHPi3f9KuZTXL7IOHDOykxtJz0cUUVQ
mHRN5hJSiqtwT9LU2XWibeJjHDNze6OC8yHUzvQHvGrv0bwFbQr+3tA9NvmaH6db2X/g7x/9hegAO6U2

U/geNqd0ams3MzTol8kLLC/P7mXFte0iMGzuKlCQZqjBpM/ZiwXPCqD8rU6bLWJ6cmcCbyHhe+ajie6O

sUv4JWxF4q12gMChbIR8JTbm+9453HFrdjZROZiIwt
yex16NoFZeMK/DIbu72hr3YHb0PIMLbgwx2lHCjceEOgMV88cdflprJMxBG0oSNIQO6jjYhpx57PuWXu

/uNNKYuKpB5Haef680cCbFUbFuG34Dx2mSONxuPz40QMyZgEKozzRDg8u8KcybJ1LMK54PQS7cZM7cp8

4DicpP91YqMgpbd5kg/Chi+inlLdeVzwviBiZzCnLFO
iaDlW5hvztid59AFdvYbZ0BCnjP/FNUBl8RgpxoUM0urJBzT5s49dGPbATawQJASJjlP7v0Qwt+BFWOw

q39gjIS+Ek3h0jLnDizapwe/w65DcCZ/TT9U1UyLPPAP7fadnESoY2NyPDNsnKgeA0o/fe/VsMOhRWaY

f3o8ZYVLdg0C51BSmf6v80B2zH2VldX/HOg8+bz2sA
sU5Ey0FMN0LSOy2v0/rtq45goRo2imLeX5Igr8xJxoAgjhonIXtH/UB++wDmeyjwL+ocb/6N9+m5yt+M

aR9TsqI/WTvcN9IyEQ8+uY39k5HW1le9F+90gZDcuAlzME31voWd2QEkG6eXsEdA7SR7R7Zd9psZhMnJ

XnlsTtg2v6pk5g91bvhnWI+viGwKVmxlj/kdB+DtVr
NyAqg0j2Yh48Z2/MFF4NoRqbnZPjVTnL9E5eUTXseIn8nscXGziUpQOZi+Yqj/3Hp7xALhwJkz3evuTe

bL89w4//0BEq7bDrV233PIsOx6dr5jkeyGPtnScgwjPnL2sNaJa9Zns6r8WHaZeFAnwwFVupm2IaV1RP

fxaGlzmpBkTGlW7aYyT8B4av6FAkUwpCQWNYijMEjY
+6eksQJhGDAzeSB4ckWh5KRrAVPwya4Hs02stl00II0XhAqRyTuUxnK2fkotjvZ+7mnU5tJpBW+xLeO/

OzqvNkdE1QRm7lpcIuIR+HZz4yI268Me8FE+U46VpTbquKY4/EC5ut+EDLebFeuIR6won25tjZ3zN6Hi

b84y4DQushU+wCDr+j8ssK1phmj6t6VgGJ8GdDBHuT
cOf5BNWJ9K+zaJymzJ77KPVNksLAmTdZgIo2kzfNGL9yxo4SOwKpKeesEApMOz6Uihwm8XBfQC/rZFn4

vv/3RCgWgrxyO1Cn8q4Ihd894/ElFdx/9oMX+N/WvsX2P/GvvX2L/G/v3Y/+eRtBy5BGEMxhzwiiOwou

Q2ODzid7BPGhtaonzX+6GGSYhjZc9vOS5jGBw9p2jt
4OmO6dM8Flf0//YZ1SFDtQfSeFVQKvrzd4SaDN9F/XTTdbjWzfGhv/ygyKjB2UWzx0H54hbJLmHbNFoV

8rG0/bm86hXt6i8x5UF72dwJhI7ifqHSb9bbkO44F5YVNGnHZShmuOxuEs7rrus1+ja2+L1w/LWVgDbb

ipejhrR96YEm4Zaa9jfYmukqMJZf0kJqTOCBYjLbDs
LnGr0v3YLMp+PZuORQ4MuWvoxs2VSfgp8kYc/FjecfUFXe2+DhYildOEZ+Zy1WOp9LBO3yrQL1NG3C/4

u99wpq+acmxfMasMBjoRcHKwY0AhYUzCnxToBFJSVKOlJIFeVHKKu8tsIC6G1uUJB7EurTJHGRU/7vmb

psSa6Fmfuw5x28G/tpz+10y0nkxp/6rj2/ZSlL3IyT
eZX9pA9p9fcubhuctbIB1un4VDUhFJug2yGSepGQNhObcI8e795QuRmLKFOKUONmAJyugX9h8v+ykpsu

zzkUYoPLEcWR6lWbjMPjDrhyWFjoi5PwoOMtUeiBId+w8EPAUJGhB+5DYeYCc8ocrcwMdqU6Xp2GLr3M

MLriEGudfChtABWmx21QgkC6M+SqRoltY+9p6oxKNi
AXv0g4uRFr2eWRtzwWoQP7soRcAhvxOhh9W474qp5XELaxgcdgEG8U10Bt59QTZ88VRg6+1egu49tL9c

SoXV9lTwoPGQR1FZdvbt+pQChy9lloukxbsN4I0Dbv/6tYbQ3WPLZnP2Nc969doY05f/+lvgrXFhnAWl

gOqe+3NVmDpnpTV7kKvGbGZ1luo9uyjuSe3zX+3Jz1
LSxt2gxPs4z336BHuaFgjhpF0jhg2juaiLthRN4HbVoz3dBg2m15HMrE5763EMtgFakOBvPr9UX+sDgm

tNgwgiKd567kPWZ3RszBhaLq60/fGUlEHAjJXUqNYYTUsLbzSDTBGIElVNanhY9lyMmiSBJLYjWGq7G0

q2WH1dHHCpCaVbFHmiBNg9/ASUC7Ymt38jrWVA7Vgr
5eQo603j1MgGLKdq/osf5KKWorcZxOA8wiVjsp0Ds7GQtFPXCW5idihXqpUf1sEl0Yz5ziTXIJZ4cFvA

Fa15vmddVQXNiKnv/MaFKj6A5pZzDBgDVwoRq05VQp42JXfwf/u4dQlpTFe+bU6Kukyi6pnrrkEBjwdj

nh3g/7+sUwZ3QrobeuD6HkXqmaLBLvu7zyUJl3y2xu
+9mMqqJA90g+sMh9fx9IuaokJ2Pu1Y5aBcaFGhrJBt2j7g9Q5qFWnlEQW2Ss3VCXQ38fEoVl058+02eP

GyyYsFfWgLQyJN0rEU81DM/eNq+pRbrrbnME2spXrXn4xrKsiQg0NFiyheHyuwLY+acIlJtJE9l/CZSg

Q8XIsKL77qC2EdZI7u5cmrnY+MkcI/RfAfmoi69zNo
k7W49Gq7SIWhBgiRZnEkLE7fdax7cu31C8sGoGKqZ8h7LR/xcs+XnYIdanh24A9VKMqFjZ9byXLkB3OO

ftDFKKz0nPf0DiojMWbD+T3FHGYvfjBbWaUO9iZOwwm81KocKu3BqW+eGi4mSB4jAqLm1xI6QBlFCizx

5gIsG7AVbbg8Fr8B96p2k7oB8NpTSy7UDThN20cy2j
Jt00bntfUqGLlmsyZYi8JWzW7ahff9ezxCQ2bhPB3dVOJoDI8UceBxPROUAR7ASpEPzCQdV5X4reaZM6

cXPqZlS9xvgmBzRdTLNL8+tuQbiN+9M1lAvmSNI0MIVu3V7vezksy/UZ3Y95UXQRa/sA9lbUEJWpvuVd

BYhEtCuLdt4d8TZdn1y0CVS8C+FQw1szKoKlgIRxVA
kbn0EJKFBLiLeXIO53VzTqzjJq6P3RgRNMt2R3GBXV4JqtG2XO7nq+uLzu9j3Buc+e/lo24dwcbGuXQ1

DyCuwzqFKlf4ZL1Vxlk6Ae887TabTEJ198CjFLs8pmfOLJ7NzDza4OuQE286Pa4smknXnvWhd5t0W+Xi

KqTE/IQE2MHJiunr/EqSanlCt6DIk16/RNHaFQy9Pd
lzX/2oZT26+jh6btZIeGaTBly9npSwNxHqIS7isNDHVrCnsB8D0kEFVKm63f+qV4iG8RaVwPJ5sEL3oB

QOPDtCCiIKktbUSPSR4xE9p5cN8kNfh0Pg5G0eJn70Oj/ggXF+4nzoU+S2D2y2uPB2kpr1Q/fsKFh9Gz

OY2om+VBFPRNEc3fajRP3M1eJPkznnff9etxqHfE+7
TPZ1jiejcDHdGYVC5SpQ2rE1KR65CMSxau7BwC2Vg+Q/8YqPFf55Xzas6+csnJGSjfhm2fJY5bdlY8nW

tMkvqDBu69eKgQZwhVe5YJJOyAxfHcGWBJKo6BVwTvv+8Tpxd2WLyjIz8C9WCLrEpnCb/j+D2+lGwLrI

yU8XFK1Qqa7kgSaRr3dARaukZ1Hk6XdcUBQOLuWrlX
blSDv+GdFJv2N5kwGv30LlhkIUlEIosr0wbLS4vQ+U9kNxFJryB1MyjdSPFb/rdbQ17bmmuXqW02sEkW

F0/dFWoXMguX5Xs7k0ewyHLnuliuYb/xxljNk3JKqlZntjxVUGy886ENp6+ijKtLPWph3V+1cHgBGPYB

AQpzios9cn/DwP99E/jyMVtegS210HebHlt9ujldYR
1+pfW4ilz7RhocFHoKuD1fmxqkkoLGOqmtKMH68sI6SEj0zaj394IyytJno5jjOF0uDmq03GoFGkW6LH

d/6hcTOvQ//tY0kwv081ALNfPPPak9WH4OWWoTx6hWL5lGDdiFPt1E1yMKJNq7cMEP7ST/cG84DAZeys

X7xeimNAA5xBFFdE7pxjLIA12L/rgwBimZpaOooY+h
wbe0h41fi1oHHPZ5Oau9YzoNM7s2VSLxdMnC1EM2/KEEcc6meZrrBfyEEP4qyHPGz6I4sen1JV6XlIdv

4+tnoGBmMF5gwJHLqcqnVGoIIV71ymwoqz9M9i0JMrl6M5ir1nS+XBVAPTdwRFhFIMVhb2QUJg5uSwXs

InDFo2dsRSDrX0X9giqT4YBEbYklNejfBSCE8mm7FK
m8xVCTg9zZHWohJ+69fG85Svz1uq14XLYnbsl5HFt+JDtG1a//o1ARshtgX89AQyxUg1PizHcGWG7hXr

FiGBGv+lJZWuae/ahvMgWtBtwJwhqtRGZrKY6Nux7qwgXcXLge0Hro6smY4RsfRPXhgBDPb5CEBM6tOG

5eIa9ZYACIwSl5ABrRvsJ5mCSnAvL3jinGF3Xc4bUb
PJuuyAaV2p358koEBWe5Z7YMnfW3inPhjS5eIE+W3BGegZJqVdqgJznm2GnPKPW63y2vyTE+iw7QvuAo

wghFJwfcqBpleOJU3IOdlCvO1Qe/9TcPkAYN5Q6sWPDNgL9rc6zBb8VaL+drMyljIEKBrJfh1XiAEgAJ

vTdqEwTLKwnENCe19aO3/LR464ivhmIDmKATdW3AUZ
CnM6RdiMcKhp17PKkj2DnkZSEdjdYI0U0rLoAeKvCtoNe2PQ+lB/M9Stn5yUZQUSnFxgvdYdYYmtuBL1

L2AWYgV0+wdj9y2IsJziJX7m0FKz0PcRPP9hgd8k8w9XP5dBO+BmI2qMQ8fN4pMkp8+8h6ScNYzD3W3s

gfDcRZAFcKzS4tXZqYyVYSxS2MZXtkyB21NPUhb/Vb
v5dEwHa3BS+xzN+dwu9o3mSekl8MG1UzEoCrN6gm9gPPhswU8W6vCnD/8CUaqQRVlEaf4QIVz/z9w6Ls

QC7BlR/Yctkf/BrGP3PLXyI0YnL/z/lAJwUjx+JgOOMsGLgi9+lMv6hvVvczrrnYENIVlIjaq664c2ir

oGIHM4RiMJE3XUw0+4pdwo5HqSuY8Zn74QuB6qqZqa
93VqRaU9wW6+FiKAWKViXHOyBZZv8iGxhrs/IzNGtAch9lRVWmsTdCzJylhO+eYNpXlC9xfxkQeZ9zvR

dc500ua74ouZN0vTtlotxtHyWalU/imyrRSQRsSBEuNLaWE1yeuunTU10VVqW9/gRvrWSDBYp6CBuI7v

BpEfg3hhq2hJKdJftM8b7Yx+dMzwSX8E3jnU18XwLq
CMqCOlP8lH5zHKyDi9+Tyree+2ZQ71RQGuR1Uu3jo6TZM/g2Hg/BLrqRE2n39/aqtW9KyoWzdq55v76wM3

5JnxIjOeXZDHKreETqffOUiMwHdQJN3XoHjCaGtrkcU7aTABHKVDGynjI+hkEoGv1/Z8O/mJ0Wp+yXT+

pntFrJt8ocX1+xzJCQMNEPwOSaPoMUjEgvQynp5f4T
TRYa1oxnwWsV5oboIoGOJbwYB06gKoC3sCt94iRx0OMvdrvDm/QHNfTK0qGcTY3Dp8sLCCAztjs9AiRR

1IdqEF7j84dJT8HlhWmYp+5HRUULsDSla2pbhkaagNbgX6c7UbOss6HF5GX91v2cwdoPiaL9wUj6PAjd

3rIJdDeIDf853mPqpHmErR/9uqKQGvufdGTWBrWfvF
qAxiImPsrQVhTF2RDh1Akn9n+x/67HulbQFWDcEDDpXdUmV2ooh0k8dttv0UfR/ExI0iElg+qr/oyHj8

XREDQyND4KBIh6SZD/wjLbSRMEREWHwLsPjsH/LLruT5wY5jaZOdxnNKq2myAgbjD9lcDkfrkE1+12N8

ZcabXjyO+8igkfMFmHiEW/ZfouvfUBmnUOjZcXET+O
jm31tSQYs1RQMSwohIOIRCY9p9onfwYSjUelJEMEnJPNLBa2g7RABjPdbGJxaB4Vi8C/sUCmOytY9vOt

0rjyCvQq7+LLhJ5H4+eLk6fTEQRUk5uJcA+687km40GtHOre/sQpf5G540bYGkIzxuEcG01jKX3jIxXp

H2E+tUbwA3qAHxxtkWiT38LqPPSnoEVEPw10yqnEe+
mBmLni+ftvpWoAEhpy3QOV32ZNlIs1hHWEY4S7iQMHSv3vmxmTkwNQxkLs8leMPsvOAP+6MkcLkaVMaQ

3Z7+ZJe/ZX1xRwqtupEm3NAJZc7APGeYrghPcv9Z2VfvusQ7Q60U/16t8W7VTOhP8WTnMya82pL5tAqv

nn/beNAG/HGLKd/+ngUR0jLm1t4rWPiyw4b0lbNtZq
m81arbkWcc4qL/60eo++9ce1WpzxO/5chDW79LcsGbbDP4/oq9wadr5B2GXaSOX1fMzaa4zqgKWNLkE6

f05RntCMFUnJWIYwDct1WgeNKdLIhMM1U+3tBpfp6nu19gfIt3wBKav1GIfrV79M5dfOQIEcctZXwcDa

i+fxAtLdsaI2YF/hWRUO+l5+Q3f4Sp6aUmX1Qq10rg
pvflbLgzQmNTyKiGUpPDBpF2JXOYXvvGN/orfYwlmctGe5KS1qBwsTMck9spBrgSEsAwtaRMkGnyP/6d

3fM66duQ5mQnDJJM8C+kx8EGgxGhBliCj8XJSS9urzN//uTLltA0osDZEXcdZbsaXkxAjgENbWjGnG0D

zBAou1x6LcpYNI5CpH4Xj4yx7hsAgubDEhj2L9O7zm
sqcZQdiQaE9CXOYHakMtc1b1QAo7s0YkBz+5STgnXTikkTJC3qzSX8cYB3O1aM38OhHfGdWvmMaNX/nb

6k4oHH7xNdRm+dpgnNo50nZ260EdbFXiWZdZarn5WvRy4wQF36xcD5i4wh2Q9LW1s3hhmuesryRa09iJ

v+0KoTEgo8UI+YWsgQW3NCCwibKdD//fbqs9PB8s2F
tRXV0brpRDBNwr2tM1psJf1JAQTLOW5HFCxF4LTxZNtF194FDsndtEvydw9rU4NCFRJHZxd8CRsN/8zJ

UsQjZkc5oczPeu2GDBSn/gyds8b31NuDxQmBFQCzsyrNksitgD+lBp9POXBrwTVr83p7epKG6hgBZA2w

nP1sdxMLbbLGhn/PT/Gkge+XEsQULUN//uIEFqaBD9
i12lIklFGb9eKuiqpN8vIONM4156CwP5Q+rjnHdLHA9dCxktkwftrplwq4T/PupQt9MXskwAf4jSjcZ/

9vlpcK0Fd3nJwzXZer+z+JfpFuEl7x1kAZLKNS1TYDykNzTbC4kbnHaZtPeZcUxT1jlm5ZXAUWCrStQl

IetwWFJ4y4eZ+rNI82P11/LyoGUNi8cfqp3vTFZzHt
DH+MCJ7s7EOOj7vC4nhD0U1K9U5tSdNReP1J3VlB/KrK7eXhGJrRSAz7YV4lpgaVcNOWg57P2kkmxlTQ

bG8uk2x+10WbEZUm2C+S6g2sIg2eQlhFa/g8XDSlpovNyYaaLHwgnAHQBx5Z7RdzQq3/XkPDHHT/3Y7/

WdCWIj3KHNw9jP7QgmtbmSXIFH//rMH79CZAzZlQTI
PgH3/BxEZHjA6EKkgPT+3a4DQpl5lc191Q6R+xL6mf+XkUXmJEi14Wl9/vnxhoplcfEkIx93kRl83LFu

t7ykY62q1KTGqbHoSMAukhMNgAwU0LfaJUsdqevlapbIOn/g3ndtbehP1BnjO2hjVSAwLf8b8hOvHuVn

agmBL5vYa+o01PuSR7NUmtyj6ImouGNuFkg/Ge/5mc
WTV7G9ZkTeJAbRxnfHeT8ynDYmvc/m/U9y6l0MfcKpxSyCGrdhWqZVvLGnvGzjUPatmNxwC3GdNEL7J8

shvWdSLv0zhkoxuzChxT+PRxyGk3c4eafzgLygWg+teyMso4XEw9HqFFUdGfyo+rv6/L9D5iCCv3jSlh

eDigYN7J2aG6/9XL/x8Hy9Xutt239FqIyRV/g1633d
5EHDfSkTPNIPvJhjlGwqXkS/tz2MinAtgTqWh0Dspzcz7+MIUZ2N+6609rE88504TtF49Wn9koVs+C/r

Un0MXyPTe6eG/U7FgtD4c1Hv/iiV+M3glz7HSoHSUiYyQGcs1vpIEWAHYXo3AKOvSbYCv4nXqKLeRSQ8

G8XoP2bMMvmHn90RkBj6RCs5/jBBblLO+DH5Du0x9r
yQbmw/wn/tqc7Y2EnvBjIM0FeXB4O0pZkiIdVcFbvHnnABeHt3om6Dm+Fw3aXFnirkCTbIETaTRKjgxn

VTFRrlqpMzmq5vZOfcEHuUIbqhWY1aPFFdfRAjUsh78ZONjHG6KDdX4p/2nW6xPRyFeugX1VEnrQjZWb

V14ncQamrROx7TPct/8BwqHCLMXQxZOmIeLN+gFJ+d
+9puz7VyIsrb1keYC1lO+d/0aCd2fov5QSv7W94t/Zz8JwkHUV8aOXL8r91pANPJCcpB7e19XVJt5Xpj

Xqg3j1oHXsbn/ZUdN0WXJSc8n5fiEmEG6SkY5xXP6mFIM7WoW/JrXvyuzo7KNEjqe0Mrf3B0kaLle3uD

lZx9qhlDbHgoUCilm1Sve9iN6jceNvHzGIra7rkkhI
mfL+3KPwR/U76Y0A7rbk6gwXkc0CSe43Ub5pTZhJXa5SbW3/2MkFmxXP/sD7a9/meaPv7AxHUveH4fn6

0/8BKUeFXfkOVQhIRJwiqaftMQNReWb1km0vCPe7aJ0C9kPDEsv8cJZ03v5uUt2SrSjzzc6ssk4yO9me

K2OJUFhnrrMXrnO3/AfwjccApSn0DV0X0zJ+CsMF/p
ri60e9nf9lO+iw1idUj04XFPYu2606S4JkNXYo8SXH5AMpuCh8uQVOR4eDJkDTUuPUfdNpLSHEUAYps5

6uk/DPYRj+81LHhwh4sEOBcyhp8C43SWVKZmx+SJNj54wwljkFQdzl/dn9Eit/dsKhWkGNPKZ6Z9pnqH

UWm7fmMvRzG2xB58G8ZYAU5E9uc4EbDkREvzU3ZdxZ
nD1/pBKI9krB912WxwbUZ6ignA7iSRgWqjXMD2my7US++PUZpUizX2ONa8NGXPYQ0XyoJnFbTRPOh9p/

qo+Ag56IWcnUxW8o0H/jz89L/uYvvIjFQS8PZittcWm1NL8Bysok1Pu6Wf/j8EAQioytwenZ0l8/tIsg

S4ZnO0PIJjaeSsM8EkDWAV2YdXjiXsgk2ksPavWf1u
GBiA7jEDHgrOSYmhtOC7qTnnvc0xfsqpTdEHJgsN434PGWgXzHWuCGNlDMarhFCqJiA2HS1rk1xLzWHc

xuBjLX/892lHIHmgMJehnmnWdTRQeuQbcaTnDnuzlzKsPx1NH2x7Z+FDWqk53a7kN8MG3+BWETvGdJPQ

E9mLDW25HO48oZ8wUiHywkdMjpwFhpOFrwlott/Amh
5N+n6+eWdc6xgniPB6K5SHWvssGgL1xpTAGPTufAgkmV9KM9oRwc/DYPLZExGL5ZRFsN0sYvoTRe0bHT

EA+YvseRRAwTw193VJ5v0/q4Ag1QQElYEyURMq1qGQVZP1dnQQdfHcv2Q2xv4a7YPkX2+zJ/lvyPldbR

L4c3Xt8kR3lQHBOQc3RF+7JvVnj4MQT9fMijev54/o
PUKdDZe2vtvHd9fJXxXvU8ZxcRg3hsczV+/XmEZebxwmzC6+4m0rGXe0R9QSiqlVZOlMoQdib7O7YyP1

+yB9apGxFmdRVILqUf+yvlRmL5u4N09nAeZqgq72w3TC4tcuqACGKHaFt7POrY9wK5vO1pDsrRQvUlQY

Frh2m5HU7sy/cKwimUq7NMrsiDKWJ0w3GQPTaUVw/M
l0WovVQgmViUHo+d8d5WPsxwMe5eTWz+IEm4tQuZZl4ZIgXx1XYxRhsKXugCGdnjxaFgH9eOZIEHqwT3

yM/Z6F1gDihks3EYKzIfwU8uhqRgJGYtXLWxsA4jvIlOD48sJirxo9UthgetuER+wXskGu4C7B+4W/CZ

dfOMkMQtgHpOCxwAAtEmOyWFEj/1+Rm138jpD8eXdT
muyL0t0S6yG8H96Z7Q1NW+DqNAGq53p6QQQk9+XFlOs7E+ibABm1n5EkvSOjcp3JSlJtotOzqKQY39cN

76L8+vDyNHu4Zn8K3qDs94nZxAvKF8LC8DcyBN47FIHQb3GhzaOZq9umntw5phXK1IWVYLcFwomTel89

TviUBSfd8G6MXhcBRSl5crRA/fJLkmOCN0HJrLWrhO
q1llDN/6eWbzyQkDyKo4qGZNbcoUGpBBAXh0euoJLDHz+0Y7DUsdcSV9HfcmjxaaMojOhN0iEpQSv3NM

1wHpMbGiLqLiP/oWmcMO+O9fSbYiU7VscUC46hxu015BcB6XSfR3LO0qyG4IKZc2A6NXKK/tZ7GAQV11

9BFxaxlXge3hqrBNiju9pdPKWuNLcskk5AldzduoS3
3w0thSyZPjF20WAt6W0QXEhgEeBswbAa6KP3EYkpboIzPYmE7ERED5xhi+ImVhPMoxvWoaKx5F9wMDCZ

EUL98p8pR7mNP71oN/vaBERwjiS1RRxWUQ9ENPadJmDo+A5vFzEPgWzGYQyesRf3D+LUt7+zSFSkc55o

fMuOrdyprU8YVdWmAhXvsDzuM5e++LLenc0Xs/tN4g
KrnStmkSFNVlsDMKvyqxqDDL80Fy7uML+xwy++qAlayX4zyXtCwjpGwh9ZU0YJTi/s3SlZWUg6qORZBv

gTFvMv2e7Qj799zUoCl6Hi4NsE7aiuwS8wiZUNxpr0dj2caq1IkEIUx/KROoRjQCR2wKla0wQWQL/svX

9hNKxA8Fj0viHOIu1aOtW32BlF36xoX29CwipeBnVr
Y8KQMs6sludXiZsyxcOof/7W9EdFmz14nV1Lz2+OHsug/cv+fw/K6Pohscb8SeHVOa5oSXi21Jnk8dmD

Pj51yBI2/NUjn7+wj347GHM7+NyvSDoiM7X5nEZg343zK+8vuz0S29vs3GWh5djd5YldGNJ8okShdGQJ

Mm/Lto3LDbbW/FTQDPi3Bf3WpPZxZcPZQn+kPLJJ9Z
gP/Eu+fvGk/jURhxLh0TO+RbPcBFYpQ6OsoBtlwlGVOpQAtGoPbx1YpfTvzJ4j9nQPJnlgCmJ4Kk+SZT

qQ8OHBgeK2kU6b1KX04sfvGFfbgAKs4tBoXJkEACqr1t8dMwR5dXTd5mj/WhJPP4nba4xmph5cv4NRDs

wXVkI/F7AWGmAfQu7QrdLo6mMbgf3zqyIJvzP4vf2L
Fhk8Y6e4PTx3B3qNMmNbR7ymNsyt9YGdNQiOcXW2ECg3jX/+crwYrt+MaxcXRkK93qbIeMgD6VIiiimu

TfQVUm4X+KbaL/A7yQbgU1A2I87t9eyvQF44wDDM32qHdC10MlcIpiPQIfVILWpInH70UH7tnC1X2dI0

Snp84LEoJO3AVvmwGQ8zFLUzrqckQrRaNdIVNg4UB5
ERciA4OUxR9Bp1ee5/4L9+IPVmeR0drNg7g5F1ed2Fc2Cd+NeBSqzQmBUzAtpaSauQvZCh9h/iZUtg8d

SmfUik/XuYxjzvVT8kPa2l/g7ODViGYVZUFuLon5PQzj4sURI+mu7E1WnwsIwNRNuTjIQWrF1BpxQ6VC

SiGwWpqUfPyn/lcZ+rfsJhnRFjk2CA0npjnuqzGedC
Ok9C8UitsTPwcWOjIoECU8NRPbiW/0qNkN4sXN+JjMYDPDvdZDpRt3I37fIyAMQHaINYUpRovqj5/riW

E2Li7DahuLi5K+gOWn6AMJrBPM0ufISPKvU9xZptoXCZfAebjMdwhOUJLWOpuRPqhzeY5rfYkhZzrXdf

vR19g8flOLAVAEG3Fx9v9U9fC2moFgaxqVZ6wND7E9
3ejKSGSdwxqgGQICHvTQuoM9kNov6veNE0lIiYxRick2Yo6I+IGaWZvWWyMaJ33XHCSUn4syhEbR+UHq

pEvli7W0wuExwF+m4gx6zThFSxWudnWIycAR107sn5uj9VOEDvzP88DUtqWeLvd92sxeD/Ug7DJog+Va

wSC5LvJvXZNx4etQwhe8Jf27ZfQt/sukVAAyAHfOSx
1tWWFmV7N5VzniGqaxe8j7PTXl2Zr/xfX+gfzd/knqLv2m390xQJ9Iow5WM39XxIX9rwlfw3uU+NDsLT

hZ1iEW3R4Eb2f5UKK0v9LJnTjpW0S28t+JgJc29p2V7Yj7JPqoLyFQzzSeB8s37fDRseaum0236lR5hx

3Cyv12WYSEKtWKSxJwQys/TYN5l9RZ8x5j2aGC7STh
+zCI8YqVK0roE/AgcZFhmQ9lqa6HzpABMkT6lgtS5qvpkxUc1k8jxWD/kGvklkWk6ZDTqG8zgTiDKq6z

7ILlu+9HG1DlvT+MNd+iH8hmh+7KzjsYBY1Og/oKSbT96cxLlhMEsbpDOai9Mz3Y6pqp5m3SNrqyAHjN

O/YqrU5u2i/CLmEeuIOAoh0R3DZ2xQ2PSYZ/nuzEXs
oi3opnjs1ELvOfKRCn4SLEhj5G4khW77MNpR3HrAwtcFaFIf6mCzyDM6VUp7K86uN73xmiSi2ygReHMa

4+W2G9yt5EZnpyAgyr3WvXJ9ZxKZKem5/vUdO15R5SGVjU2UYksHKR94xdDms8SQ0x0KaVzeHQKfz9qx

YpTLSIFM/3ggp6qHb8JWBmgjkUqKJDPzoETGNcV71m
d4ztIXPm2jGjd1SYQ/gMaMz+D9OYivSfI2VblcXC2n8AZBiB8r1d6bIyVUuQesedhvrAws7RYTcjrJxj

dQ7ZZwk/RZLgrrw2s8WHxO1t1ecuR8qzqwYSMwF7AITyxJKRjpp+gyFrPpY8K3VI9LMd0l/gjySMlEuH

OOZppDOS1awDQKCPhArREfc0inj3qbU5gl5YfV6L/J
61YZ7xUeKUHk0nxXko+HbLZn+y54KVKk6x7sueHlv9b0wGP03lsc2HiD6esS3TIizpdYWOMtfm0XA/z0

deD+9b5l5N+uxzMN+Xjkn9Befi1tv2BORHHtrTIGC2aTrtUSSMrxcFf8VexmkmYR97p08b3TUlyMKO+1

3KA/sbgN6LUMN6EXlP9ZiWAn+NHzaL1UkdeZuvvAim
e7ihMQOz0RONH+kmC3LoS07Fa9jP/ldicuUpcmGD4zZbSoy5kcY3XSlpLrHE/BbYGC3qao6WmBtKkwDh

GeB117hAoWhtM0Mgunlw3hlW2o2XjkqbSGhPCUN6KMP0uxOKf8V3OZd+oYF4MgiROuqKOOtNekItnrnR

PFUm/0SrjgeHlYnHQu5LACr5tm0ZIQzlkTwDDWEe2U
t/DewRIf60kbuNAi6FfmFBmG5Rw2qCGlKsFzfeIhE8RF9XRJ5OhBHOBXh6gh67pzE0BIL6gJY1olFIwu

Ivonne/U7e70+68ohMTPNuwzDkO0CU6EX6Zzw8hztYv7rGi3/DjVgEqOMneyfx6aftYRECZOmr9Z8wZeFnI

skEO8N3qVLjk6gNOWmriAJRASiy1abrY55HMqov+r3
e6reuAk+JU4pVFqVHKxtm0dXjzk+9qwephgbVLZdt1D48mSE4o2qgCPiKH6b4w9bT9YkZjsNXH/R/R4i

sMcUmC51ljzze202fPzpLuJPcRKbGLLGLqLLb6up/M1bJecFmGljDawTtzzXAbrzYULIY/FvMin9phGe

CLtCcRmL7BetIx74NdLI5yfK28kRG/UQ/dVCAIBk6a
q9rtCf5YPgOxt9yH/73LnanT/8xQnBqdkfVsQIfJZ/HEJHp+YZtIh1UA6Vb9S32gE2nIVa2F0YqPWMwl

s76dM/ydgMp+B2+P7uUOCUpYxi9New4co2wdaHv/44TYkbiZp4hyLi0DGhKPcOUc0eGRDNh9n8CG4/1y

z4bG82lZe2AT0177lHZDU2wHTVcDHPUwC+ns8d1b0s
ceSv7xJIRlEPxo6DaPs7ab1kkNNouwMuykhwya4Edh1JIhCzHMjV+5hybpZBMpVqBzI5auDt1StGAKud

tvqvtNuMfRm2iD4qlb7vDKK0/zl4K/Uy8n7u+dsw0Pwygn7PVGdzJ6HkTm56Bk/ciNggYDNOrY0s2VRS

wl6J2AjcZqt4Pw1fIXOGK3siP01/L/V6T/O2erwP4T
2FOogNBu3j003LhJlBVylM67tZvnu1ZaohsGoWzLAhCAxAv8wW+Q2ltOTeqmksw/FITPQz4PLCivagJ4

oH/dPwaBXH4pHXVxsE3izYe2tcjOXcgmx+HU0wtuIsL5fTYq7vmnFLfTx6jFEPkiYsVlfUWhJAhdVKNp

2sNMBWGKJVy42RLfFV5iENOQw6gH/yxBsfdsvOoIpJ
F/tW/kWdfNhMTV9pfsc52l/ulRxQ0m78t7MaRko2S5mQyVzpZTUMgzil/vl9Ee9/Zug4fafiSzzVJMJS

kEkL+yk3d/c0ChIOJxd+tXhArzn+UKzoYZ/R6UjP28+15Nmt7WLIpQwJ2YBlU8kYfUOmAgfap8ni9poL

iAE42nJ2CGBWbaWLrxhVegSF0rW9l3PdtlL+NTfSlN
B58Gyjw+zqh3r5/kqkxv2AHjIvVtbKPwpHlinGi57VBdso4iTIiOkThv1iMnYPzo6t5NIA/xeGlcuqxZ

T1PplkZqy+00sMffJahHSBz4LGZMdw0rUZD91Rr2nnedeLsfuSLBNuD8DnIaGeAnRInjKjXYaS62u99u

AKbF3oZ8d62EHSROvwuzENGeI8/gkh47B4USnIIDZC
mCXGAqNEZQLMQBENYr9BmbCz/7c4g+G1Luz1VMYmcfDEEJv/u1vW6ex+SsXbrlZ4zV90F8R0Vik6ygC3

/pllAnCC1fLg2fV6xVmCeFFRLMJyVsKv2iyQpGgSPnz1RtKSnbKA/9Rd0XJRGxO8C3C1xDiY5nV+Dp0W

wLgkiH5qSdkmqQ8E2BXA6fxJkdr6+5pt6YgOQrded9
8g0KRe7Y/DWgHl7ViCnj3CnvGxBtB6H4/nXfpkhSHRnch6rinW4XLJiyCCo5boaGhWh8UK0VeZ+clbgE

ftG/Xo06HiQd0IE9nNF2q8tpTVAQy+bSv7lUvu8QAjcUNzoEQjGnc1Vj2Y9byGgGANDhqixxNMcx4+Gi

bwNIBplB6wj4jmRcBBBiDeD7A+EN8CSWP4UXG5hbLL
6GH+eRLwV6PRP01f55f4qds24vvRC5iMKdsL/6cv+qZnvFDc8U68HPPoVVY6ThroWed3TKb30NpUR2v5

P/FZJMZDRx1pSFpcm1gDciu9ZDb1IT3mrsxNvrYvaz1lok8/CB3YqfBalC4b3S7ksAJDKs3pA5o7nSCW

2BDJn2JlwcDeWndlmSn5osK3fh8m3+a0gjFjSTIBAq
U36aneQm1GEjeXaPZvMdQi21XptH6FmI8DzkjEOanSWW4XzpRgwXOsz2OK46sPx/lyJlFMSPjt701BIS

GAYCPgyirBRK45A39n85d7GRPwncgUS44h7HJLvcOD7BmxA/lAp7kYVuYzk90t3UJEUGnl9O09dK6XhH

vkm+tPinBsQih7SF5uc8OwckUSeI4oOzo+Un41b3zB
W2tKNBhI9u78EA70/cjhUZWI9JFXHbqW2E6cHOZi9aRs4N1thMmX6zyzTHQlAO180BPL6ArEJnUf78Hv

7JmB22kqAQxuYnGw7lVzGsRgIO0Jb+FxGEUML2CAWNL+4ly46Iss58HeR5ESh4iiT8mzIeAd4JOiRg+Q

q5xRabyeb8RmGIuyaDiBH/1uMYLGgTVGuueP28mHZ5
gIK92zX+HGSFdP8TkU6/gE7t1jmnaos5BzNkJYdVMIAiDeQ+6WZr+I8AS0opu44YaVaVOJFoFkVUl0Vl

QgwDXeQNj0riMPnSLigX9PJ5N900iGKT6FgG5RyHL4OTsTN13ojYU7js647lRS7kzkd+MI8+p+DEH/Rs

h+qyo3ToodstyDRBZ3OZVVjYP2UUkNj5ZcjfG2DKXB
8CcijKqrYTZPyEFJ2BfGS4u4howzgpGWx3Ap8bpdO521R1+/so3ZgnK7I2K4ncVsq5RnO2xjkSwidWOS

VlnT1WrLlMNdaX+/gnh5H7g5e+cVWl3DtWWs89Ssr5gcsgXkf8zYTGfeCyhnNjOL6OTCbHqcrWdfi9c1

WUioF6F9BqisdtBVh4zCUhSdIayR6/ouTnrF6nyKM/
edI22bZQp/O6j31Qk8NstBtg5H+aWgGDYl1ulDMAsK7TQWPPMGVskLNRldPdTnl9L5px+b4wVzBLFIDo

9mmMFqxqM0ZN2pKsN90yNbssOOJ74GZgxF3eMQMvFqnbr47OdoF2j1G+UlqHuY1XSMjt5J2twO+CxzNT

La6D7HgzL+4Zrxav+TKVOQ5j9iztaVJPwKL5wfcdGN
kTTSTMD4ao9YM+KRpkyKnOtzomGXh9AYZqh/q/4QJFCZTsS6WGXsk5jabQ97p7A6zQyU5HAjcqdRUuzF

4HT1N4XG+gJlEiavEIDOIa+QdS5cM1grr0UboIV/4sqlKSBmFbv7mLrmCnCPk0etN6e8cJ29UPrAa4X8

E3OXscHwlokOXL6q/JhWGKQhLLFD3T+73mZ2hQCcZe
eF7r/RCDMPiwi197TUIiFnWNBdpv9BzHyJxQidgqdB24bWVFGV0Afl6v50t+/W55p2gV+YNLCwiwSudI

RSGLsUYqOPL4QJIR/uvUs+cE/qaV6MxqcaqDKsbpGzCYfr09Y10DiwPCcvh1i/cLFV7EtDi8Ch2D4Gmp

R/3ag9KXCMXQvBODxwX6Ou5jvU9NYY0/rwbTz1xdCQ
Rz7CxQaBInNwcMXWY7ouCJfWVoyhDMSC3CbTWdUHJTVSkUUb4M/G+SIOLOlhpXnST83pF1Paf9MteIj9

nLE8nK+RRlcAE32QBEIQTBuRpuSmtfAawEkr+QKy92C7LcpXy7n3yP8yZ9NfajzZCox+1dIbNXFFrpqc

XqQik+tLRa2ebu/oWz97+lWAZ+sGwI99hr5q6CtQgi
qsnhwZEZNbIkKkXILQSJDxnq0DpsrkjDhrBPGKB1cyC3ShqzLpGdCNXqMG3E/KAidbu52Uxnk8s6JR43

8ZHLxnvEJM9isnFJh3o1dlGrwkI4WEA9T67ejjN4clqJXpJBBBlinjcQtX3NqHe0PU+FU883K9wx8RPm

a7tlblBhBW3Su6lkZp6hy2pJ71phpPNoOUS19I2eU/
qtDzYI9mWowJN2kBm8ljJhKerZuLhQp8lDweUm9TUioDKmWC8igTGpCZrhF5P7yiSiL+oXfLMwUg9T9J

PNziSR4vDvvrPg5m/7thZ9W0ArHDfwge47TPvesFtJSyZb/B2dNkDcCkPQgnciLQeE5u+7OJX7V0pi/w

+YIOny3BFXQ9k2FN7HDDnkHRCl/1Or8M9zd7cwSHr+
G6XCPYRiwlFuoc0kKE7q5QW91sM1q+PyHuBl2w+mxdaez1jIfI39PoKx7Da8mwJJ6ME/djMuvs0Nf1dt

ssZJq6iIx/pLMB0/c/fFGV50o/E8Mbd1ryqz7kL77wDrDgGl1O8ZI4ePaZ0f/Ox0UfEuevhH6d+BDg67

ggZ/mGNdr9IukCNrCFl39YbYh7S4we+l2OXt+o+bru
ZR6lM/2D/+iOC78t03jvEkq//qOJuvbA5Bvp7Cn2qAPYz6/1wXUCFZyLr+9OLb24dRWaC0v5zE8vP8gG

zV8Fuxll+z8Kcze/Z9vUe/vz/RWdJjMcbFnxRdLG6zaD5tzchsCbQJ4W0YAfCVdsTPv32bdUN8NWhLpL

aDr9rFZNBsNqkbTm71Z0l3Qlx1EJ6Ariyk8kvqgB1X
E3dLyqrWi3fkM5tY55OMxlgOWEuBw/sFt3/I6WCacW9hrFeYrMOiSlIKc78qR9sqvc6CgR3ICuOChvjS

0WS0IIW/lwlH/lTSKnlBM0GxtQet7vhOjTwdXpOTZR2TaT6yCTdhSevKGlEbkpljBJ27C8furC3NH1Gr

w5Vsbu8bsVtfchf7rEVpOn0bDnys4rPf1enDoPx9vx
e7r2HeukNK26QiiwV/tIUc6akNSXCOqgCZnKPca5nSg5Rd0XYC7awEJsmLg3bdjHH91w9B+6q4jCc7cN

LcDJxPyyfKUifdfZDpC/NKWzhhF4w3ayh5II4ZjohsHe7207K09xe2ldV2Ie1IosGO6uFmtswfABYeRH

AYsftCd5A3dyFAZvk4trs8f+W8eCpN8fM17YbLvMc1
YUzTb9Th5g5sNRvmorulEHTg6zesa0ktzV4gQT1tDwaq1SE/e365L+dbGy27Ha7TgfUxSCnsyhFR4ga0

EmYS4bHdOMaGn7NxDX/DlqA0OF4xtbEjUz3r2P1ggaA+h5U4fbON1Q47iDHF6LfFmCpB754OHv8139Vz

lXMH8/6/36qRNhwgVuJHxIyNv+y3Wsn9UhAm0VQhEg
IibLzHFKPmbSa8yaVdljCnbqURh8zLTw7vpX5/cWB+sMwkpkjb/rPrMZnRpUvlcn9sN12G/Rutk1w2H1

U+Wli0RWrwHUstOL4/9hymbBAiM1fQ3lHJ8FeKUIcjGah7DGzp0OT9XlCW32Nsi2R0o7dsOxL4V+5ZYF

EH9eprOSeTb59MwoA06r0JlTaSXGkv66/ID3TsYSKQ
Vzk9/uaMs7RomNv619ypD0MKn1+5AHQK3GRLe2KyVKd1+RR20B0pcg/r5Rcx2aDm6VNL+V2tu5SmXrXx

6wDP0w83zGUwVGu8jiNvtqaXk0vRIbyf5Ceb+8KyFCdBxDwtzday3gCzrSXXhUQdFUIdOxmQEGPJsAqv

Wn9vG1uL5PQEAygSZip+P/iL2V5JzrVqEwlB2XtgEo
risfFKav+/OSRcRZvig3suusvHKp5j2HlSMR0W8c/WmTTHy/ZISVYrB80aly29j0m9Z/tp58Wiw1tQkd

2CPn9bO7Mcb2XI/ZZgvimojhsxGh1jT+wr8kyc7j9co11RLB2oWhp764nWido25Swgd6Ecn0p6ZdryOE

wsElC7eAS0a9n6vHRfhNV19/GIIp8hks/W2va35pgp
dPQW/Aktn4nq0JpsR5ODth8ou8cRao4OjGxWppnaaaSbrxfqf6PpcgsfYLn7I/0L3rgH97aAMZUmrrmk

YPK32b2FCEqdG7dBlN//DHsFyAi0qEEz9kuOqJ7204wr95FB2Sxq91NoGP0wJRDO6cfDq1x1iP/zL/4P

8Li5kw+acA6TawG8tJ3aQ1qm5EitBEgADKS5vVYAbv
wBJJKmO1ZsXLqM16nTitaDCgddhx8mKHt04SIu9e9Zx1Js8EROLyhps7qV7VFistf0Eat8cFntPpJ7cv

12Drcd61858sY4A65Ymo0lhOcjS3AFmDhklqzYBTBZaihWVMeZqJDJTtRdGoaHTEPpFg2OXVHtjLM0eC

T9SuUlMBpXSj+dO2HvyrcHbJ+UXw8N7eJHUk7CA0fF
Jw1ftx5Zww5UN9+uigzWAi7VB/swZrtyqEAs2RVJz2d64Lfnssi7hDkylSzZpfOT2unLKmhJtSXxauHf

W/liLfstKm16H7iH8gtSmRMYrYAUBJ0hMvxj47raPajMgh4gtkBlU4nLfbJ7+IWZ1XqNmoNIZ+JtNkNx

ViwGWcnh3DqsNaPKMmMpthYjMn5W+f9iRHVJR4BOTv
rcl3kp9WuwWe65yb5kFSaUh2Z+hMSj6kuaYajCKwzzaPwoRxgziCCkoEoX3hzv0DVGJ95/t25ZQ2LE0K

iTdq9Fe3ZCl8xuSwHrNch9XHuNtrN5M4qD/jgjXGAlcQd8AdYCa+aQXlbD5RYLNsxdsz/9PTWW/aL50B

wwLVeY49II70BtqaOKar6AfbiMulon4sJa7R22fvqA
Ggpk+Jpb+7Dk/ina2ulXE1a3iSHWSyW69KgzTvLnaLnQQDxWSQHKRhocbLL1iXp/czCejHp1ssTkc9Er

k7oob02ACswYI7pnAr5TVImzy90Y0SUBS1R52BsI3csEwFcDMtKZF6mfCMcuI3ScZeaKxDnQIrYme/EW

aRkqg/SCMUxaBg8E6rGy8pUDncDsDzDYJLQXcgIbAI
WjsWLIT0Gty0B3qXB3fIJPxwTWgfcHIMKJ30S/k7wd89pfu1zOKj+mAPx6ISm8dmaWLpnK98uigEC9/s

aio5ZLueGFSgzx4/cqXjyJ2NYzwmoS2bq2DFphQ0Rb22af73tpobbiXCiTEt4Gt+9sUHQQGNd38THp3K

zh4LMSarRsam5EG9hSyPVms8AklpZyNRLS73P/BKcF
R6Z7m+Hxim5a028H9LLhRuFLoDct5igVDohCuUkKbE8F3TQ2Rz0IhOqvv706dJkT9pvBemaPJjiag8gr

9cMD9+9TulOu958pj82+5TArl4/+HH1GgI7vdWIn5+6wQKkPjgoyWdE7iRiOtOp1lqcjRcln2aKV0bMY

sgaIXyjzw/HG9vwWNlwzIBVz6L3reKvNZVwVkDVIPe
w0jgZwJBDMIZnkun9MfuSRa+lQrcP5khHTG0zqGHuZqs4MI2AQTazZ3hZ6KNlmQhYGWGxfTb38uCd4b6

aXkYL5kWJ34FLSyJwDrbLJs4GVA9c7xSjll+cWEeTUyOzLW6cmz7JNu+T3hAil2CTANXP1HfWVghdmD4

BP0N1BWEFKmmvGuCu49re3L4dWu24SeOiMxUu1GQbN
fWpiamxz+QWy5Gk/hwLc9kJ9KSluarlSOcn/uefuPBQuuPecw3mAwr/ZiPc0gfCyS5/UPZke7DGXGxc1

Sert8EyWcHJyVbIEU3pH4R7b8WmD6zCOxxZiH6iXAWs3Uo9flla6hZ7d4KUaejZhl7XmZhDK2L0gd+lT

Txq/JelObi/sa8aruQmp61BbPl3u0/xp2T6E2SWaCl
QWuqgpMPOR+L2FAHfo59HOf4jgqXL+rneM6smAIpZlFrN5JJMlto7uTsJ/M0CHOHmQ24rylC2YKe4pDa

umM9H2UFqdHSoNZz9wp1Zkgl+eWPZeF1NuTc2EuNg71Wtr0FNUpUWPw4XpbXbqiKK+lZDRcF7WofeecO

gQ+LSqb6KIijK/O44VX0kRXmNQCwBgnT1ZJcbQ9YOh
Q65sAr6FN+oWGEHP+2vP+0KUXRVNkdtELXLp5g/X1B0TAZBu3YvIDS804EWX+HUl9yDtxqBYRoJgdIzM

HXZYY1SvbEYRu9OXi051oFweQVWapvrfSMytdilqhyvkTFV4vBZw2loHeuzkBGNQyaIRXvfu2GaPcPEX

eMZY9bI+up3a3ZiWC1sn66jR5x7PU+efq7kon7H5SA
QEAkHzndSCitbAXnL6mw3c2IH03FozfoxfZcDCaISp9zgXBXfutJfCaRV8DEnXXHmHjPqnw0g8oRRcL7

WY4DfsTLw/WDTJlTfKvd2yUl3ZpGc4cyU8UZMhJMgQvr8j/inUHu1j8ASniDy2ldiN3yhJdL7XryNMeM

fkUS2LZXDVlKd3ZZfH2o1j/QneDSCxFauuC8RaZ6sf
aNFjtVQd8hDfgBu6d/e+dU+fv1tf6KlY0ysvEri7nSZGBlFr+NnV88//EHZ94lSEJ0AIJgqr3Zz4cNTH

KpZUpuDcPyrj253KJ+e9ZSIJL+h9D3wX/RI2eRaqE5EI9I6YGusuuqoWbVkzva6C/XMu+zTDsTxAIPf0

A7nCmQi08ltDm9j+WcjVtMz806Z0xF4UnxAZw04v/L
u9zuHv9aYAK4mfDG3lQTqe/mWTCnxbVIlUCgUy/uA6zDpdpKum/Yvf+Z5+VuhOtyt5xmiA64fD6Dn0od

wFXa3qQxDqcbL/jV6g5ds5DQdIrrA+gybbsgPyoD5EcB7xRehUDaA63W2i4Pz9HsaZZwhIsKEx1D1EpT

Hxweck17NPTy6XU1oR8DI/ghs8DBXUl402luEfkwDB
7tw9CuWsyd4RC6p9y+kCDklw0op3NO104yFUJRqRmbtHxYXhikAC7PTH+bvDVI52Mabe8itex0O4M20u

u+GHRPe8ZRbJBN1cJeKKzVWDskW1yTRRuZ2IX25CAQlfwTh4BOCqqAW4NMbCfkZQ/K9gbdaebN9kSjaa

LtfHWqR1UkFYAYUkL4eyGyaQQTKS5/ciU/o+ZAL0yT
pAAdF5QiNPt0OhLNMUaUu/e3nl2Bwl+gI4K1p2xIvyF0w6/+BVCPNCT5pDoXYjcWzNk7nARkXv3Kd1Yd

gheYCw3a4fmv7MG8SNpah+qlsVHV7PVDbby/wxYDRYp2n5FhV/h7C/vhcLWRlDb91M/zlPc/6ja+aO/v

Tt8fleQLiGzFmF7fd/bcS3sSj2IcSOpAfv/Eca66f0
dhXxRhKisVT4SYGTIeDDvjPtZTCqBaLS4GL6m9RJNlinwsYRdze6XYFeC1+VrDQfZE8xkLagoqzkAhPU

F2gPfWGS9tClbKEK74bbbo7/+6rbSHM3/rUJ9H7pnVkHB+V97bu8t5u5spQt3Zst/3t1fbHx+6BcQF/3

3fbTAdcmaBEzztW8WPjlNK7N26N7y2WGFlILiWDI1z
dZ+cuLYlvfKDSVzwe8ideFtsF4gDe92/mfCMh02atKsJKtw3SbjyA33i9ZskF38a2+WvHIRX5GtmDbCb

N1Tg2GAV+JCdx68G2/FRD0diDJDCsskJtGDgf0Pj8ofzCzWUCSjPFtzlnfriVDyEL02otiGmaLPzxRgQ

5OXzWwl27jUUTfjSYj64ly7ZLI5sUCXhnTSLSYAo6o
Ufwt3ljVxthpx9xnsKrlA1BGC+gDreJI9zIFR6YUpKOu8epPLl/dNoOxrLxGtZYEjPlMcr9ogY/vEDMs

3Tjh0Ktpryn5ldNxztAsVg4h3a2blJftBAWo11Imj8sXj7zR1LRYzv0FLAy2yMHSgP7Wy/kVc/1aL9c1

iZpBpWmSlhVcT0frhZ7iDhwoTqvls67ctpUo7KYpU3
vZv1pI1ZkZ+bVAaJT8jTvVvsbG0YsZ+e6pengnf3GHNVv7M8z+L+boJ05bpnDBZyg8SHsVOpY7csRRBJ

ICjOfSFJvAqjCH2yMRK+Opc6boeOZv6nzeINS+9Oz+aQEgEgp5UiVR8o4AbBYyoN2yyQmCWwk2ZlrzJr

uMpD30H7u1nkPnutaEqihsWeAU/W4olBnT6ae5F5gX
9V2I27u/qF5b2m2YlhXCib9U5iV4/ly7/Q7VQ1GNdqw39yoPl8hC8F1Yt8TnQw+i24qwt/WP8IhRkEJA

4bQWIAxZ3Hb4AWLXIX/KBhfebs2aDcwUSZm8UQq3HZl2kbt3JXxNucaaeSKr3Jpq4K7TkD4WAqycxpgb

U/3YpG0xxkgPlkYLcc6C0I5GZ5ngfjcN7EEMo+knLp
izellGnz9auuyqxzDhJGU/DhO3Epd6+qJ0lkDSF4YjMUnR8aOd4I1uX7mtObgS6Hr7PRuoh2CD12wqdf

MO8uEkUkgOJfne5p2ImZEaDY4cacCGZg8N9mXwvuNj/QA9/UZiFw9R06VUjREhRNsmS5TahqiTSq/4L9

U9L+8IhOOtH8UDFA+ZIX3RfHUu8t3enAhoE2DJ0aXF
fGuAjHHUhlQXk1Ht/mo94fJas/NMGPievJQE1dVLi2Eq4JA+JABVhch6Y5VAiK0Bh5ZWrwNZYSVf81rt

HeP8arh9qE7/EXw+xVwsuGo6p41Lu7kfuWrchLQ6On1jrJdcnvkX6KOtuoM60ASfyFz2L8JY9kYca6iU

q8C1RyX46ozocPjW+ihvv0X6hAc+IvEaJ6Z73JEhM/
+jIyU37Sj1fbmFfcxz6n9H82DQWjX6xIWxclECViSpX6xh+6YdXgEGtt0Xiw1m+kWLQEb56fF492zYcS

7v8/cfp/obN0TCm3iAoFTCsScfwwkiMS16Bhlb+TJv9XSK4n5xnY8FQk+ZK1T6x3t7M4jv/vswkE9+Ae

llEPBzMFlIwOUuvtaNVEjPikvcOGblFPh9V2ug8Qd+
FEaQ3pJ174gD2iR3+5mP/PCQ6EL4QyJsj31ccF2D426cU3Jt+4ovk8N0l487Grbu6xMYTrrjonnizwIp

gz4jU8I38FYtQC1phXfX8sFstrVhW/myc9eDGwV+uIu1CCvjTroRzwfQLse+qya44guz/NNb/lKsVfHk

kkvVxDFXjUG7Cle8igBFqSjJjrd0PXhdcOj1bC8P3A
FcQC4T/ZYwtyNnC7Iem5MrZSREi8azN1HNRzD59euAc1k5XIa/Qx/cOa8fAQRf/v8iQzVT6dVcSoBSoH

8cuca46w0xBYy1R7zdUHfPzZsaQm015PmHG9XUes85vlxi/MXa80K6WU4i6RDF9H+Dr8ipMgKEivC2bm

Bqj7leV5SPgP4DkX4Y3vm90naiJBURT/lG90Vp397W
9V0jC9k8KsO+OTE/65rwyjtBYDn1PT0l7u5Ts0c210Q7Rbt7jP5szWgkdpLrYI5BPD0LgASp9KFsZkVk

4SxSowWPzyS4XKE/NpXIraguOLms7fhPn4nzxGO9JCQFmdwiTAzZ/IjL0H+Hb/a1sEp3HBK7wWtB/AK4

BOUCg4knpndXvifV8b7pvOb1v4I9aJccVetcmexoGS
Fba+M7pp4ftQP+5J6hB3hlcgTkapVgT3Bo3Xk16wHeZVWBOnRIBiUXO5lcInqcNOnutvn+QJcE+MUS7U

QR9q1Y+phfekLQriZEc1yQ9LqDk1/05vXK5kEsDxILMo0z75u8A5LNYQbzdZXrjzjCOOTYCw09Je1GoK

wt1waB2dWKJ+PKtIeAE9l7gMCzUvWwbzMQZj1SPjjU
Xrrcp1220fhx8TUzWHkshB4zOjJr+GQwTTymnMMhhQv3NyQde2Gos94SkV3u4Kbw+NXcePuin/O/1f+V

/5X/lf+V/5X/n/y/L/97es6z3ICL8q13X9AP77udFKFHaYGjaqk6/7V5+ZyJLPc0qet08IorgR57u9d6

FHmi3btD0dGAUPkm0Bw+sxf4bu3yeUHdUG3iEG8+PS
Nc/5AmS/MQhDcQl2kYyOKJOBqGDQp+KP/PUJgU8LaAvtDFgTVsEPqUqnbdk9uO77XxHz/Q28TF7neLKB

vRdxJ5pmxZ8gZgtVudHfydmP+WuLTdCD5MEfwQozB3V1dIdX0zR1lglCZp2Tk9/kc2N8G1acnSSwsQ2Z

1bzpiUMxFQT3eaJwVnZLK3YhKfH1ImsugdnFTQGHF7
cC2nufH8IWHIRWWsIYB34jeqi/YTBo5tasJuuOVEACt+jAgqqvIMb19fHc43s/v3LHcDC+mmzfdH5GpR

SqYBbhKgF2m2PhvlfBKKwLUUyXzjAeDU2jNOiI/BGG41b3HXJx/1jmpEBQRklJIzHs7P8BWHaYVK+UGH

xD33mI3WI7ToipMk8unRjYYaEy+DHcaB854KdiThYK
pdRN+S5PWWVvCvJoZy6o5U8Zij4X6v/I/AO2F/vNAeaS7H5lylw84BKWoIcG6CJkQf8UZmHCdS/CexJb

6xTn8ChQnk1JFTL9VbWWbhx1mFR9jMyJzyOTSn3mVy3HJlHcqSY0GRgbrhlxHHqpdgs3W2iW9JQzR8hI

QoU6uD4RXEmyh9muy+PJ5GQ6PPeD/yxeMzmGRME6bj
5imtXj2YcTQLFRoiqsSMHDQ96TEKSSDj85tHO8IHd7/udAZGJ4/u/a92bQSps9ofLyP07DeL7lgQaCGm

pAxCXplNelmJwQ8OjgrqIKSFDGHDqmiTNKmg0wuPoEl1Z06R9lqLXYTuedMrbJXK7AMxU1ljCNAPN7qz

6fmORf7gKCfkEbFDS5NoEV6rycwu+6GoZhE2hS2Xg2
fT7q4SC5OZlGYx33MWAIQZw6gqjepJzp/zzENPRFvyYf184+sTNLK3zbViQpbv/Fdf7TGwu7gvazNDco

oMms9PiNhpICEhBuxWrTZj5nedbZOk9Han2NBkf8tvQ2h+MpskJdxpfRNJDCa41AX0Jnqe14Kb3KKq9T

j+9C7ZfvslNpUjs8ADmSGT7yE/cAIdzT/fGEXpPFs0
hlAR+q64+QlE+17X1sHOaLstmLL9CzFRYhCbCbBwMtqEBl3i6s7HmwU2QTSal0tynGgibalZqH1TufMv

Jm/6SGiRx2s6klrlGoTWUQjPJ/7vIASB2hcDIyW4cM427MqaieQthmMVW2qZrucZyhmcRBtuQC6mNS6b

Oxigvkg8fRTUdl4zLkjpyFCv7huZbvu8MFuDjaMgNr
mMyuzN5miL3097UQ8CH69YV8m84QrlKGz3DjFi6AQFGH1Wp/VoGZAR/XlWim48iNChAFzW2q2UcvKVU9

wFZfIN/mho9Vj/VBxc/7QnmBlaAemSd7YcdQahEr/MLgjzCRluq3Ju+PmKMvKlgV+Q0QBr5X9dE5QTbc

fRXWHhDQ9MNF3T058HcWY17p9MXcDQKh+DOuzRCqXX
S3SoUJYfyVzW6zG18eFMNKq3cHPSCgttpAK3XRbPif80y8AvBFrCd5hRYcbb8JIeYENti0/fAhMd3AA0

6lqsZ6W2AxoZHZRRO8ApR42KqmosuwAee4rP9/wCR7ib2R7tbdmClpdHOf7emClZ4vPvIebn5zDWZr6k

uBOhY0O337BB8u4Zn85y9C/a4c7vf9uqztJvYHtzUJ
qWLgf13sxnujIkYcwSSbnUtAeY/zLKUKOjGLvRPbhsL/pYCr5IDXAqpgczFqmw/s6fkYK6jEaazojTU5

Vn54BzQ7cLJPONLafaV26S7LnJ1LSejEsRp61MEogsywftdEAVYaF54gFfuw79U+IlSnMqLsnoYstT/X

HqsIp7u5wjCqKmfzYEmoCxGVvLPR0spjgexvSAbQur
XxCYpp6/pBfM0FB8V9AteIbnxxGqu7KdoUWF50JV0bXVUF1nvJezvBpf86JDshPDkXA7kqCFnEiUNJ0T

Moc9ZIKSsWzt5z4Acj+Gg54roBU0hUHw8TQ2PRHd+nAFv06Va/JqJi8ucYeRMtRHPEDlZas6FHZ9+FZD

AfsF9mCUA/XGwnqgEz88suwH7Ip1+5M2JzmZxHiWV0
vFD2MlNhVd4TsVjVsBKAbiThPi0lxxFmf5+DBtA7DRvUjKyHSzpN4LlTSSsyIhtiOfWNGj1fdIR557Mr

IzNvmPKWQ/jh5P+Vua4Sz7hz/p0cYGELd5r+V5wflT8NxxHsGWwtGCI5a01Ir3yxMuW9Vy/7HaeNUzEj

OLUBt+/OwEVdBIkrrhSfqmCaj+274PHecyu8PXlhLA
m02wy5B3n7RqLIgyg/lV0PfInayPUSLa9iaO8McfET4l3ZFDo5P9JuEud/4b/rJnIDtPxtQTRx479C33

NtRU4ILjk2xOGagwXmri+ID/jsLMvP05U8d4UlmKDIwxc9yxU8czje+rN8w+3IzlqTF6GkYr/y0Y6mDt

ldXpz0JYMMoC2PBlKlZoqldvSVkiuecPxkZdhK9HRo
UZUne4rxVbdsncwYaUSqZ8thDn49seqQgE3epcZ3bdJWZ+9pWol9revLFVBd2h/Bg4ySRIH19L/dHr9P

tcZ3aePCTsPnWrD/FPr0gMCJhkjWm414bBZf9FGl2kr1mNgrgz7MISCnV7+9ndylUVZQXt4K0pV0uId9

ClV2jkQtwI4XKwVokAQd0rB7MK2C0+qujN1QFN/mr2
yRifOSPZKSqultKOjZ5CfVoew31V8uOzlNREkq+3ieTg5yinXnlpcKXjve0cE5P63/cCYW/XHmXOz4pP

sEH17qPQQT0bjMHAjfB1y0HEWqSXnyXBZmaU2W8QTu+s1zQryHDo2jvPvxrxt3cXtck6EQHW8FciIEAq

HlmUUoAbmbR+QCcCAQe4MD1rSbr3dzK0J5LmhDbIsY
nuTZDlmKOl1IStwmhks70M1EVYAOwGATqPFAuX4e0v32jDa4ob+fahzaCvJdsRJ20KZhiFIJnZe8x8KT

fa5CSehtDxezGcHgDJVtwP07g+qa5iINEhlcDi6D57PgxM7SYfyeC2j3hXU9a6r1Pzt1TQlIqODop560

M1bmOrG5yEAhGgE8x0xs0QCmt1w8ejyOK+XJ25IN2P
mXTD1SHKjbZ4TWnPN9gmjojWxRPOZZaJuLhoSUsE8hZuXn3j72p5ic4nwOIyBpAkNIWJBVnNSTzpv/yG

nQriXjYLhTL/hl/NLkXknBTsmRqVBy1bgCEmrSMU+H5VcJ3rmRFXXNSpUbVltAa6DavxpsG+K1jaP7VP

dJaiashV1UTCR/7O86yiWbMfyHynGl6VzrNym4vLmm
AO9OQhv2G1OXbgkTmKlXq2wVmJ/AQv/D9faJrsO/uB32vegoNQhwaOTIfdlmHvxkhZZCcdWZAfapZ836

zn6Z5+/TUO71/sioygshRbQzaplpl/wD88oYpp4qYE2oX4fcB7ayfWXmaTuiBpyet/90SHBwWxj/mLff

JkdN9SpIS8w8jsTeP4Ae9iDOh1vOKc60Bgr8/cB9tX
39UUOeHS+5//GHV8tDEJB4ezN2on73Zdq/3ex4i1e58+qFT9ONAaBvo9N0DwCJ4zKhUKVuU/T3eDADBH

YV3VVDFwhoLQJqcfgyKnWq8OArFTsySQxqdhp/+69usc4g1kTVmRkpAXfsV2p++el/eXnqI9RTzVGwKY

LBTmnoQiOaHhySAOs4siw0QJ+a7zhO3IdaCFk5IgW3
tnVqtbem6DKJbRibNmSPv7d1A7PiQugwO3Kbp/ZAk4Csnp+8Jh7Cy0nsa80RVjQJFqOL4HFnBsfnW6f4

qMeRBVk86cgINQbw+FbK58ELuGA+KtAE2qxMtMVxN8kBIBk2Gq9DzadOGk5msnWw0WrvkcAt+5Gj5AW1

n0udLR+Yg2NLrH053kO6fpqOqUgbeIvBU54H7Pwwpv
b9lBjLzY+js1tfawQkJmsPzBrJzRqv0N6wZCG0DnFpBApg9A3AxaUJTINCzE6fGkTgYXUST1/Q2lwJ/6

yhJRsqFZPM8c04Zl8tMk3TYJVvHHLHSoZs2vnJECh8vRzYb/lE9g5U37vgBdz46dZxwnwleGG1U2as3t

8+GlyZn6PVHJu63e2ENRNU1pQ6onfR/P6026bv+QN7
TnHWW6RD9i5Tj0HTUX5qccPTH+ytibpvMq0+hGCyunrGXlmxTaxUrvZmc5tDMK8AHtPdUfebUCU5DbZu

9W5JqY3jePTxWxN3WJIFuT4DneMQFWi1lvMB/KPRhWgg3UyWDXHgIQteFsmhg9LCcJGnAes2Pq5h9wCS

tOdSL1LVBoptr2gM8ZfrlQtGvTjoWoMErdLLc/QjA3
lFuEQz1djMUNlFpEtAkngV5aWlYreTn0OGZoSNljtbe1MW3ORpP3g3ZyR1mBOfm+fqQrNgwewJ+abSBQ

djkTT1sj3Nmhj4kyFgGNK7F0f1/MafQmWEi4DGGfoB6K4wgMv/qYGJCdThjuSR0RKCqcjyj1emC4Jvz0

l+7IYCqOtQjihElSUbAEFMnlWHIkZ9v+Hr59OcYH0K
wzVBH2al8BZikZHjXo1XvEqJntRoQcrPUFz6YG1UVs5Bo1bCpOasPUVPSgEb15ftYtWJlXHPFTiUaTG7

AXYIKcCtj+UiuYozNnhr91uuOVE5LLOkE+gV7/izg4IKdmMXUl9MX4kUhGpMSAwhZb5NZZ9XQb9ulDgo

RcS1qjkgMg8tMMXR8KYVgnkhPW/ZwIKT91K9cls2Zf
bgl8PVec1jZ03xhm9snB9KyRDmHSspE3671HM0lwU4kOoVZycFs80g6dSJyYuqUBtWIeR3EFz6JqcGKr

qA1aKYd/SzDbi03xBmTsmAvK2cBRKSVdS+rZdARY8QAVVRaBhcdnBAy0pewYqyTiZCjHIkFM2qh/HriF

xIeAC3sh5bvCyeAcBCh5tUOI8++q7WA+5UQTeL0wvf
r6bvgxFFp2mztmaMj2J3vmdZ20t+igDFnE8nwk1/4wybMpctUWPFvWy5MSmgdk/MTGowA94/qxG4cMM/

jb2NgAM67w3f5Tfe6sMugIYZxfHUxl8q7vL6fTBmlWCxAk6ddwTAnRbJ551YnXf4l5AvwkZ2yBFqxRv7

Ko1VLTxrCfXTw+pvf4AvzNLqRg4Z7lvlMWlIOBsmKf
VOVE74fy+279Vd9tr0CZYaWYyQv9/rFyXOcw/M7orbE6mmcItifvQqXzVTgpRM7Hf+He4jiRm2lFiks2

88xjtbLPzJOUPjAq3p2IYWVy6CTiEhY3pIJVnNRCMPMXSCl6EFY9gxzJBa55Pavh6uY9iJVXayAeFCHx

9qG+ECpKaTK3/aWQ9vPJH/zFY6qQY8+4BnNmgJm0Gg
v0nzxgOfH/I35uSYkh9h9eDUd4ztXyFAWq+GFIN6JizuJeRlc9fDukkz5SOFlxP0XG94q6wLy8q41y5S

WPyb4RH0P5y+GEHMkZtIpmSkYEOI/dshhW0YxTdFx3mjdjJIU41QKU6Mlv3hYyHduQ9ZSqSuiSN8G7ER

0ASnCHzrjLk56YGtSXuwTHS9cQ64LAaF4pZCBvgIvG
xP/WErlXQLgOKYbuUQToiB1hTzmX8dibbyT78brlaxegn7lB+fYfzv3L7VdoTyzAMrEnWpS29rVxATTh

Pu1OYXK5+9Os/Z7KR+vyNSwsYbiLUs65dHzqIzqndFltMDk+sCRE4lofqX5TfdTEQ/zupIlMrKLOmmij

NIfR8gwP/RBX8yrNJ+ViDsh6wNAjBk0dHa03pseij9
TFuNhPtoClEarpaqYOdJBI1K9qXys4NWm00XutfSZIhGvmIsnTymuQAaLhK/J7uFuQuGpUj4Y5H0aU74

s0LnZpH+BEoorKYGmBSzqIcDKbuu3wgfX/oAqAe1HLl+Re3NHRWvYUUButkfmlitiCo2OG1gc8YMi/pu

AxcxyWUfC0/NCcS/MQG9Q130WSyfeNtmd29QvW1TDC
wTexmSZQousl47FvvN8wgMVxUMV0SCzru25Zid9KLe6JxhV6j97YC1IfHiVwjaQguoJKICG2aDxSQdgy

weM2gzd9xYuyutcOilpMkVg0Pj4FVk7/N9efbjByoKdcV39GCyndVv2aTCkzAEuP3gTJl8x6zKaF9Nzb

V9AcXivAPGzl0ra4JmIXFF05bTcPWWm0fVnwIkr0Xg
M+CrpQvGEu4eZQhPwA/HFD2MEwvI7z6lkHVW0j2IdmG5R6GO7pQaNs2pbYvQOY/qN5E5XxMwBuZpfnB2

1hAUlNivnQl8P9GHOy0He3J+keItKWkDuySDCw2sBG6pONeU70XwSkQo8WlF3D1cKEDBxonsjW0dXro+

h2fYlLADcW7xMiy0Rxi+cD79qw73ZgkCJ46VxxQ9MI
QMArPm54zoWAtnBhmMVLfm0enKawI8Bl2AsCV/echT3+4Q2uTS4pSGJ7rL7cbXtmtwdVjb9j2y8g43Lm

FkxBGfCU7H+9+kNKqFvt6/OnRXGDs63gkCMuswPvsMI/z/hJHg/V3A8q3NJaoCmRSy4uGizA5hCpr+gp

5SsNZXSkO7P4VijbdBPrBbI6S296z23iwA0Jpu568z
xOxMMdA6fjFK/RLYFxnNqC9ImyjRN/CpgcMv2Hiaf1J4JC5v0yU/3DLbrshDKVXCPucu7sb8zRJykLVR

nvbmT9/QO+4laB014k6BU4KfC/pAX9FoY24qQBYHv+k1BmqiFTS8DX5MYGovKAjDQJTgv8yHmcJTbi1K

U0IQa9J8B8lJ7UNDM4UGgSupIgcACr5rZjoHnBM9yM
5DHt01O/Ph4FonXeqPZMrpQwe3QeuG7zrFXic0V/CYvT537jjROlM8ABWVSFVuuFEfDA0IuvAblbQtPO

6JEoG4q83NmAc02zrhb66HIESBnXA7A83Lzd7MCJ7XBoypkewU0e6KMqgyRT1+lMpbuNsjiHBkrkmXz8

QpD8qxRFBMmalOWYp4yL0NrTHkBn4c8bFAqADJk76j
nDkk0b92rfE9V36s+7JXRK6iKGQWul1HniJFvEgPHyrM45EniYJJUJPoOZ2GZaRn3Kw1HjDX60SCyAbr

AaTf9dBZQ6L5MlNpDYtHYN9dSL6Cavha41lSJ8XHk0Lzs4K+mYmJBzN6FLEWtQ53+rTvLDK82bX8gmDR

DTMb/H3Vyvbc7WJJcwEhTC5mOEONA/9uyPi/rJhCUj
wjDv5dxtwTfQ+ewfo+9ECZTsapZnxbtzMZgpR1RuI8Sa8Vj/b7HyBD0Fq/TVJfeIyyju0wJ0cNMIO+lc

mcmcF4MeZK0uqB8W0Syae8/UeOLWicHSLtbyx39jwxcNNm9U2UL+IgkmqG6P5h8vhVPwZgZaBkNxC6Uj

fVIpeB0wiD73yzTUhOmyUF/z0Ag/ZqBopubtfjiayd
ih6dVTvCT4I2GkpA5btnYboQPBvkqqrRnJaWe5sltKkcz6gdV5y94gd3xMibxyAPoAumjFKCR+8DdVRg

5KLiP3Ejz1KIXOp1MQSwa8W1GrONi14D1OQDL5MV6ZV5wE/5NqUmaO69re2uIU3Q1aeiAeGBpgLNha0d

ZzWNll5QKi5ZU8e704FLRy/HS4berQwwFvpLpgrGCj
nX7YB4a7e/J+HGL5oK9tBMLtsh7naovzkc7r0XsINHE8Og/5fAb26pwbR/1X/Vf9V/1X/Vf9X//6nyFA

D1/+vKSxEQ2ASv22N0TtGPE4gqoJfJYx55tM+6lB68GT96O+iHVhnqgadz3KDf5Ntm84JwtxKDutlR37

jqd03LJohTGFcXsDZwY1NIxw53XiCJtdIoyet70C6r
pI2MjPBDNkQGzB5OjayYg/p5bIIMh/baBdouIlv12SdPH5kQSbmgLJITA0Fx49dwZsIbuhIQMSKNtLMA

O6+SkM/7Ilk2oFaoIk86jQrkkdDS/NijjBV/U9naWpJ0ZmUrkFnGUbj6dGQ6dnTgDLRrnibRpuyYmiZo

yJNHCaniO89t4j0fpTGTFYkZFTTO1bHyyKwvWv03VQ
CzQctq4ATA7RdybtNBek1Q4uldHdxpPqr0BuDw+HpcVoj/PV5W6tZSUSnTg7uwsPnDt7IeeQqWLuKuFH

BSRL4NEK0/pDyJbPOa1E7Lp89nclk8vfiTV7hvoKAyt85uMIyR+Q4kMRoAD6TGnxONSlwwSmDrLZqoVy

wdihtxTYIDvv2a/u8l831t+MUd4dTU+VztugyY91rS
3EQmCzZ6CCRv1g3UZwjm+imT/fdukwigQbWog0mr1W1Tbfi4zxkfj6yosAmYrwPTSTVkGBTqQrdXe1QP

y+o2Ld90VeckojXTsIlIXMbHOJUk9SKXgPfZbgBA2c2ir7zhWhrry5I9ox5UsxgyGbZgP4CspWfeJOPF

Savob4xiy/9rtaB5rvRMixOeRvXIZ5k+/GWuTXDWjo
TfA/WllGXWhIh5cxJ3iKdn2IsKiZGfb7uCxln3vQbKrL2Fl0vc++/iYoh0e/13ORcawmuIja6LE4OcVn

nGHOKn7jnsulqz1T5NWNo9QNNydgyBbevUuOHVKmsx67IgL/s2o3M4XqsjtsIr9x2EU944/J2MHDfZW0

rOS66vB5MLSHrcXgpSJdPRMT6BLjnVwy1DmIuZWfNR
xRbAz4eI0szHKUYR+xZQ0xhArl2uw1s7KfGxjRjmXdPobU5zRNvuG3j0godw9y0sCh/WGOAN2z+o+VjZ

5c6KHnXOiRodlxUur7mtx3Mlmb30crzx5NfBwDsrnabiugbRFRuHyGjLaqzuT9qrcBqolzJfOUdN42Iu

sgt9DeHFcG5uJmn/fVkKsbIEh0g68iuAbZOYp1sjKr
E2pafwzRnudoVENkf4spKEnkvItiA+JE5CTW8cBte7s5oE+07DYqxY7KIi6ps+n3ultaQo+YWyYuCTRp

92LTz3JsrvYuGOU7WPx5wJoCqJFwumM0RJ0Xm42iWyslGNbUS6sgjrjeEyE1XTc1ncfhAIWtT0r36ugT

uXBFo70cfOdglXOrxa5JRN0OO8faBRbonRxi1M4jdi
9Vz6I4lAegX3j4i51jERL0+4cszXVgjijEGKrDKu4v8G0eiUiMzaVSLE2zpYHbJtIVfaciIoqxwhSbBT

mBE+fWfzhbrAU1cpxoHTIt4RA7u7OTYY/Ui1toUhBW20GKmwheVQGE6w0hfbM0HaWKxLL4CLMaOZ5I88

V422VE2vXwqa+LmY2CWGqJF28fnB2giE2r58pcKPT2
eJqIxf8tU+K2/YuOIX+fAz3WYM7DLAD4SIPfVNnoWxvPSCYEqf5qbtlDGt3+lvbR/PKIYsZn2RUOFxOX

a6aid8i4Xpds+F6bXtTHW/FESMd7C6Hss1ozB92AmX5Vn+5qVrRnl+fpJyvC/KpzhzNKlLeEzy7XvjFG

YSOEpY5qyoddGZdE4y7gXL8bfqsIXt38ZAdXdVzApl
GvQ5p8TIwbnjwims95dE60WokZfUvkXsyeOYKoqfkROz2ifGq4ZSmF8sLVjeCZMaOzihw/p69ebNKm71

ca/pNQnug0h8hNQ6gML/IMYh7Wp3OKmhs10HeKixraHEwxuu3ux7j8vrjJ/sxA19p/UKUhm/10jbceoJ

f7xmtplq6ClAzyYrSUrystaQFY3aUDsncj+FPz90tx
kndqpaL2tmqLbkP32yZraOk3t/izdRxLndHqQNZ2ShAR9aRZpO9gdClyYj6oro4VRHh2qBpAYFPJBtoz

pDzbsi/F97Juqfm2wSAaMZ4kEEcz18jU8ehLBBjaYEo70LDpksM1YmHQ37eH/pamWrCteYdAUVBwqODK

3lVGm+gnrByL61N9wIAnkyExlrq6mHGkt8SCdnm/Eq
NGMEIiOnBSUCBRh96VqY9JdWVAJzl/UXuUb8f3pXJEWidQl/w44qsUK/9Ev17Vfb41g0aO3V7PiE1iTB

6kvAuqUj5BAm85xDcg1qRJJ+3hBrSu9CdD7px8BgWhQB+1BmABJzGQ1K/G9AItCUtuGXYMBKKmRgU1Th

L99y+lXE/FkOVu+gzZb+wmmRN/VKC/P1fvFJgFKh84
NzjHwET0cMH6Nf4CiyAn/iCwDoZLMh0RXml9kw49Qk6fTeBwXkdQAENcMPzqDbbvAEfjTQ/tjIu1wvpm

IqOMaNpVF//VcoD0LboXKBfpMnxrd1cwbtxrQEfmU+AiUf/Ws554D4m0UYoQ119dYM5CrXaRynxP9ksq

LpHYy6bCC8RO0VugwH6Z+QiCGEb8jtZ1LggOt4ebZg
I9fJDbTX1v6S3c29LtHeX7aaappmTbNHUW9hxYbg3z1Mr0gWGijhz0vJnnSiXjeh3emUQBOub1P2vEbC

gIURIuVnKHv5YNCCt5ue4m5zPuXOpXr31FJd1RV6EKiGdH0AwKJw/jK9xAM1MiDljGtHU+rNlMBH6O+Q

WRxVODOQXzaX99M+4g3HFqpsvbL9YvhiMpTI/U/Z7h
fsAxFZGDrkzem08rp+8ADcdYWEBAL+7bzTRBlnNZcRE+2r54BO3owiXk6fISr9JwO0ganKlfD+apbpYn

19szjwq9t80L1y55I4rdmL+V+4whBIrVblcGheo9zprYmKzthKKqmz76pLG7Ss8vqksEwBl63ZTdF+WW

gthNIkwcxA0SDFXsVFEWIkCutQr99STuRUveGSMy5g
wr+f263eq87JSvRc8ClY/KK6cW7XtqUtMuyhJ6LN5VZsyqzE8lw8pIhb4hBm33qPCDycsxChJMIOAeJv

51NmgKX7aHEqJ2Ketop5P6hHYSijK5Y6LjWPE9NUIzLrhBy+7Jo1nOq9ege+ofEgq155qZFIilprE9un

oqjNZ8/bl/QSsoP/Zs9yANw/AZIa0EQcg1vADHfELT
5AEuMTnmSmFbu1BK8RE2rDmnO4y0HLc0mpMyFq6tdHqC1sDH9xVQc7BDD4azQHehdCqK+NMCW5GVQEzj

5GoLfIezf1CVHBbVW5fo1/1lPCj6vMMDjt7DUoMPzDw4/X/Ts/xh2MSt4ANNGTYxGHBZ1hLlnhd0wx7D

fpyAsMY6PcEXZJHN52GlgtolULfea+Ir79v8PaI/KW
aC1ZzRi/4ZAbieVGBfBS3ECkwezyaVnqICakhsLhRyarDOdKOxKUicxc3qAbjHDx5VyzVuqWcRNxWuYl

syklV2T0gs0fQ0Tw2u8YYdRIqVTUwr+R1jq88xN2bCo2b7Tg4D28+OQLB0En4jD8+qJo2/OugnoW2Ya4

BAAQCIY4Lvtzba8UNObE2Km3fvydNkfOEbX1CFuBG3
OgQ05m9yfT2NuRpWZQOQshjy94BSlFkk2RjH5REUVYNAwHEaHJK3/b2sNwzzt6WZxhCwaJRl5tIUy+aE

3Ycc+NE8lO6/1k8yXjg9IMZmLElYFUiM4ju8g/1FpSRGKqVDb5Z905fqhjyQOTpzIMsbcdaW3ZopQHO0

4FwZNerA+PgZR+zDIuDCszfcrl6l74jQjzrWJjTGUM
6TJcQBGoZnlYYszq3hZwhFG27+0beG9zLhDbTdtpYC8PVa2DiQZlsPojr82mY0VKqlJGKGo6Fm080mG6

dGA83cc1/n56o1sEdqCsQ6KCl1T6At5+3Qo0axeEvUmZXovPdOj3gZRxmpsCYU16kRcRDC/GBiCD51ze

SrXl9AV5xUT6Cq5LA5/VrSSAdur7PSI3hLq8W/Sp6g
riqj9Aa8JruVlpGlLfiJc9Qqbw/jCXhCBu+MuB2PXxNdhloVNQe5qgjwU6SbFo4vly8EKhivlRxNmO1A

Z51qpLEVnQc/TBMDdzdh6iAq7f1w7wEFw16Pbb0pWrXlSbhb4VPD+yayj50mDCvv1yt/NzJ+aU9ierxr

B7PRRcnbFQXJIzOyIdl2wOSNgL89HKsDhzLcncHyhz
uCK205SrFWckkhv9Dho65v3GMniovtnpRPqpFiwxoLgI+sFD9FvsszW51/z0zQn6s0QsagvFiI/I29dY

gOzwkLDc1bEbgmsMcfa5Pn/VZFtuMyVQO4xbmIaKTf56h3owCnlGLO/y7kTMKzjevVaqr58mtS1bFfGy

CYJz1SFk+NU2V6Dg2bh2WvtlHRd4KwGJhfAeBEIrOU
3JE+iquoEZGB8dY3tsveRaX5N6oBY9l58T1ZhnKlEe052B84eac7xb+L7pJnrD0tjnS8N6zZ8QY/Hqsz

tUnt/MJTVC2SD0w7TcCWZ5wUr5tacR3EnlM1tFu3W6CMW6G1Cya802j/mwVbgvlO9oVTtLJCSpS6r6u7

UjX93rOBu+bqxs8P+2QPKErd5alcn4k6gxojnMa/jw
WD/9bccA8+ew8yesuzuzkyJWbFmv22RujNmlFptdVQ8/5Q3qpx9Bxechbg+1za/JsgiF9krKZU/gmyHQ

4UmYLZSjIlcJzwS0dTx4g3xX8t9oHdjNqFz1ehuuqE3gGc1s5jaJ7onB2JOWAlC3L+R3Fbe5JGeg4GwZ

pNmQ8h4S069OTDqCvZ9nf4QlznA2umfr6WrRdXi5Hb
PhUdbDSZfAxgty8VEn82rK1pljdo6WDCDhOC0fcnckGGheDaQAGeONNU+ul/wgTl5+mK+XomwgTPfXyy

ekvzBTe8c5Y4m1rAT1eNCALP3F2EtQ8mnMFSSNM3DibIP5xP88wyFgOmLAsvs5L9cAQhZidjf8MSGgq6

6H7SBZqSE9GT3Izb4AreQZWbxQErFyoFwX3YhRdD7a
3SGjtSlfaYxgsA+BIoAWG3SCpnLuKnlF9dh6aD17nsoGYqZ6Kzv4Yd3Q+8xhfkmLDQtjfYwVQxryg5Yy

1CiV8nPfwkp/TMp9MJcY+KeeXVmTA1WEYkS1YpF0AYJksm2/44injTSk54WaVpTpIUqsErpo9z2tfeSf

OJ2QXx2HJaMuTM8vqAIBKkwh56TsVkRgwd81sJzYXT
z8Yi0VzfrcOqC95RKoHlzVsaJOQssk2dWMsjVlU3dISZY/Fk4d8dspYT/CNP9NrymrK6VaQV7fbG8lbI

t64aO5CnxWWqHvgLiseAbnGSERFvrqrSKGZblLxzA/FrgGca5oQVm5Mt5NKsmijUeS6jULW/78BvURiK

5ciE//CL+MZChFuIRqmDC89bgjF4IqMW1ruj+kQUOm
3gxi4OSywmhoqiE/yLtGoedfKl0meqANy2y/4Zvr1FDl8hjvU7cxJcD5e8JQuckZJYaTjiJVH9zAJsiF

ckAklvgD8wwIAL9TB4UcyvJTADrevq0luLgXTyF+UZD8KfRdr+99GfPGh5tGtfoTwk5ghECli14i1Y/y

bMaZR4uf3G57Ai3PbX1OSjVgKQEHahQ6oZh5B/GRUL
elAPaLZ86eSmld3cqTKCDf+wjwv6R66Mfups6aO2aWo81PAgArfp+ytWaka9bN3WyntWMISdsDhC89Os

am94S2gLzLdg/imglaYOl86Zcc+Gei30yOgwHzeVXlurSjwPuQfHfPuY6aQ2s/eI6R3o19arP/kg8Pyy

J/VqpA9RYU21b47TzCXuLhiwIk54b0XKoaEoRBFIn7
GrUKSm92UFzseeM6u20mm76jw7VfqHu48WOuPe425HnoI9rSKsrWBLzIadlLl0kUwwVfCPjzh1Mc448l

F0MJNfmR7m7THw/3h3hPNuIoZ6iooRCJ6WGaocyvOZNCSJo/oIoZs9KRBoWZztcAcWqUa2sYbgIqJ9MO

b3su8/TF6v6SQ6MuFOYdQQ3yFWhV3SW7hTbDKbNUYv
cWbBRqJ6oywHTO8pQXWIb5GAOqlxN9IW0SuINyS7HCupmaQh+SgZB9TTGRz/O0BTFDmVKYQE3VQcKQSA

NuvaGuQd/4l8RrMpqHnTQ39a1OS7lgBnoFiUoNs811jDIAGacCH4BVIm0OvbLyERTUwRFX7NsmOlC3em

OIQVRYzFwTuf50YUaDx5NFbA1/Y/ivIiQ7aCZOUwlE
KwN74+qlEXsGu+s/NfPEc8OUUEFiLtyuOhr2VEsXp2H+mR/lPaAvE/pAVd/dBUuz72HBXxUl5VUEtRbv

0RW0ABPh4cCe/OajeGJzX7Ya53R60pouVKqCbrYc5IIfBcg7HNmnTbNBzAKi/va8QOqvxOi8AZkeeL5H

7YX45KlnAQcJXje3WyuMpDAUK4o5fWZ6G3yuBBlX48
0T/wDHvd7Zsf3P1zAkUMpZzqp6pudKzYe0hZO8EBAaUt5RCeYqQPTAyQmGfCvvLY06THdlLyXhywGB0M

hsc14FcnF4EDEPl6rH0dFdNohkFd2rz29a7poKLfrAdeT7eR1k8DaYFx1KxQnVv7K1gwg1k+XTOJSGMF

zOrmTam8dlTSXKF9lDYhQ7fdjKAMJQadroVvORbQCn
5+3ZnOsUQPeUKBAffWWEJPeqBLrYIby37aAJdLzlv/4dM28RfI2GK7jg3y5TDlvOtOFG+IIj68lJsGCn

UOVaPRu/0pSo73FlCQwNxGH+OiKKbo/kA80+y8kmeLjv2DCAfdb731p1TBuy6c87DP1VzFc4hp78kUMo

S4zfMhnGCawsteHFo0dWkfacuew4Q7f1oYSEvcSAR1
DMc7z9EtoRDaYljoQMX8i18WbnRJkbxj+D9UQwXz7c15EZn7AfuTSpDRI2zwrWYhhikw/gUm0hh8dzmS

t8Q/D02DbsUF9/wn2TcW/uEYTvSkXgw0KajIQVcNs48Q32BGhc0v6MhYLI6JfsDopiol/ijVOwgwGM2T

YCD6F59nsD89Vue4wkzdtKjv4KqKCR0ayxZ/tKzySi
jS7kJ+7D7R20cAAKA22XmDlQe5w5W18tAK6i4+pup1wwlLMhTknFQvVnWk06AP2CdNWhSjMFURXYpHed

j4YauzP8Hebd7zrEy0pm6dcNO/QuXE9HrM2RsQJulgo/N6B1YHmSeG2WJpaQ7Vd7n88JyqqSWvX008GZ

H5vBneDOD/UXOOfEzRTYspF7BJBnd2HJylQd2r3o03
sQhUYeG2qZ5Gt14TmlfFB0iJr8jrUSgi/PEjh2Ki4fMoicUPEIjLPjDI0baBCH3wMw1Z+6Ic9yKzE75S

9ce7sx5KfvCLKxu/GFSGkyV0HH1ZfCd9u8zNFw3RlUMoVRNdH+HrsmWOEKOq5kZNeyUhODfIfUFnU0rP

E4ry4twwYURUbtXLzYC2gGMusAdFV1hoUycRvJNf/C
Ro8RUzH9R3DooBOvGvBh0LaD1rxogN80yPscXa/ZwhGfbGMLpdCMuBYUuf9qCiRiMjeWbr+CLWSnYxBx

4Zm2UeF9lv0r0h6TynCkRlNCa7BFhiyr+Uk8uuyFtBp2CNyNHjajZBW0d/b93NHE35Gv6o3qtLotQ9zD

qoHkfNT2j/sFG5LuoZXEuXfCXigRg7AD2+BaTVc2QH
8w6uEOaavgF16toMQfa6ZvVA0yJ8vRKypFnFEl5rTJVDzMieR7rP/0Ru5MXRocwq5MBU9o/BC9czZvpp

JCCgFKoza/1qwA2yhXEr0xBbmqVBug7Fj1vdDRKj5ih37qkYr4dy/5sci1W08B89b4sL6ZuHh0Vzc3rs

FlXaD8S8pjhBQ1pO4JFWxlkn8ZtoExjCQ65fHS0I1+
w9g61+VVdkVTsy4c2e0XJoFKTvEcY9D6c5wmYHV6D4dUV3beCjsuJs9TfAQzuWP/hMNHDqGMSw0uXpxC

WyOy7tPCCQmdlp/rYbogb4+3l5ASxxiFFZwWUkiWamQ7g4wdX+nCss5SvOYuogO5ESc5vtls8xQVPpbQ

bRd7x9Y2YJZuUvhZuhnrLmRm+2YEh66B6K0KFIMo1M
FDBpa/ni7Oup9HHMnwmy8p5MbKS3zG7w3McysmIzFYIQxg0kYHYr59AJwyR6mh0RqgFZluJN9kumZesm

QRFZC8e6uMYZ1Y951IrWNvdeX3AATGXFY1nDFDc5txnPomCoS97aT0T1ECV/OeBPeFBw6c6WsEzo5emy

aJ8YZmVpk5+0yDH7+BtY8mfeH0T1Gr5ZtCg99qtCA/
0+b/pMCN/1rwoTHTpQN3/2ZYScOtgtemujmflrk7PyVe+JBXgwhoqRI0OvnHVp0f11HNqScUVnDnM/vN

rtB637qUyIQ4Bn8IK2CTwqU5XzxXKoJrfQkaiNvcAW2rXv5R8W15S/SlC5Qvk3DYkfQIEh5N/xGFOnUh

9R8m3PtTz9XGMRrOHDeZvC5iL8V28Jl1LLCk8B3WBu
4HOjk/ir1RA7kBwlcES9g4yaORLKCslKAtbkmyJjDIave8DJz//TtUKwnXqoXHY7WUlk4Xgfv1IMRyKZ

BwgEsFgLYGd1crpW9sBh5y/1EzOWfythysGFAAYRCKGFI2jMmtYtNDWXMCoa8AIr02X79vas9M1RwEu0

CedLayoNZ/0axJ1TeF/euxqU8Rol7T3akPMCT4gV6B
6dRlwpMxpl5D4hBnta8yOFv89ZJI4LbAx3i4UIQvFdRGldJcJxIMMmDqwrLUpg6a279Jx6OiK2Yw0dLP

lq9Ko+eLTOEmjKpWcNkhpD7hg+rzm9KLMIPTU9Ng9Ksl/f5vYbhEk9ZicG18HQJ7n/06nd/z5kTBQk30

109XKMeVyPY1anJrJBBSKdUqoC76D5I0BUa1NyeDHg
R18EuydhxxBMpFMKlFVFx8nPd7C5+7HEVMNbOINdvWu4BYNRZJh6W9fNG3/H8T80nGVOP2cbfcTcIfGt

j1HC0YwuaRcC0UEhIfQqKPY53+9WjR/iaFDJRpkn7Z78wDpEoxBwG/9d/E/fqzL8mjRO/mk79EL9EEeD

4YS2drW/o7KVBZSFAmCQ3Cj90U+jS87W3HWWkvSPo+
dfTxxNbAkmpxFk1AeQDnJNV7id0ZMzIY/FmDlfONxBCpo28j3SYweie9ZCfYPaluYqkvuVAUecggMTjO

8jDDacRG2wMIniwMWQQQVpF3eMC/GYPfwiqQYUTRtGFiLkrybUN7m3pIAjbQJy1l9gpE0x91CfbrVxqj

A37JDZ/DP5RxictMocgBf+HdZq6MkxYiYMT1sHaJJO
2fcu5sVhRFGpAP1+HjXSlUknCYOE+ZjImfOIG9++RQ17gtidqMwIAHoXugN/qQ0q6LTW0MhawEBCcjly

RKeFHDALPw9ovGg/EJOLn6nlXHMZET4A9Ggg1oiePHrIfY9O3xw3vy+XtrYPn1IdI7g4tb79acYC3M74

WAKvQOpEHDK5RT/0+wGE+1eKpjGyjYxmstZTEndZLq
BzPq+bmJkf23FtsFwS8tmzrwd4ezF/MGkx0/K4pBKcafzUK9KxD3EYCqRu+13kU2k8H/fPOfvDku41T6

+hs77GaE/2456G4ccYNkEGrupyCr1PAR8zoVXCn2fKhQGz5XGQHFycgT7PlxwCI3vdQuiud0HCS+I+fz

/PyAC9pqEQK9vYaF+OouISlISLPlTg6eV2/IYkMD4+
7sko2R4WGM5bmib0UgIpzdDILKuVY2WTBJzJt7MgYpRd9lusqwsCYcLm4zeCblMawUf64+xmW4FbONvy

cOdSjSfy9BeAavtE5wHx8Qs9TDqrC8z8V3mnalOYf7fGktM0vkcHq4gdz34Enc2Vt5S4bJ4u7+CQfI1U

kLxdiDhu7Igi+oTNWiM1uqLE9FYKfekF8idjA2Syp5
kCiK/Z1l2eoceqMSeBzHPDATIo2B1JPTcTh/3lZlNa0kGCp3PQIQ9ppuWbYXGcrdzRz5NOHTnz7YvNXp

kujjgo6oitaU3kGlNoQ4m+wDSrUuBDEmRktM1jgj/LtH+azYMgu7+bAxvMjXuXuNWby8Uj0HeMYvx/mV

BQYPeHiuti9kc0vzd61LUJFzUzx0tZhA6KiS0/k6fu
6Kq6dLYwYGJ0Cg95uqsO+PAUL/yypP2vZQ0HJy36F9+RqoP//+sQl+8e4r1mLp+pEfFLHr6R1OU/x0B

ok+Fy8Avo4mkJbUUmnGb47htq3cZzd+/FR3csKBYN8O3ZgfyK3dhxts49VlWBaes8ohmYihSsMBEFmMy

E+enZIumO+682W29weNvN+0zkLBLB8hiL2RnmOpRgk
BAA11WayQs17J7h4ysswujboxmb+nmfgX1bfwiv0ak4SwtU2hbN5K91Cw8PiHl5lVzcqGo0Y4A7279ew

p8BpBWXJmfiRtQ6xYyr9Spnbbp+9HAcEy4pOLxMJsrzbdNQQychbRiaRPbSrOduMUWl6mhj9ccWjHPET

G3vH4IFELWk/3KWh6wwU1dyRJciOwYHk4nE25ZRZF6
QAqKaDh3JWMrR7UTbeH3X5W50385v0a1elGXSE9/hK0vbwTHcSuWnGmyakJDzCh6j0j48IZ6H/0L9Ito

W+q42ovGFsp/05shskriQNIrbSzvd5PbrzloFhJ0o61VenC5lmHhWiNHsKdhgdhABTYlOBLvf7YU4IaP

Fq6vuhlRnlqwR1hEug2dNRSMkwMETpGAqyOqmjHCLY
nbXvAECtSKCtn5hgUT0s0M+7hMk97QvbuonlnpVgf71JUA6hcdGn/4qWX82ZQ5OtuRusq1/YOd/P5jW6

/czx5CvYh5wVfD9QjhVduJmtIo7hfou9PecC95ErE126A9OidaR72dADOHZrqf9liUVuppwG1g+M9IP5

V+uOXW3zlHv3Ilg0TxQjLRO4FBWixP1SURa6Pw0263
qI4Djem2qH6kox3kKM86JpB33b74zDf5qFBaFuInD60qIF0YajtNYhy65efzZoYUG+sEY7bkG9+lJOpG

IepYx7btEHW/kEATy68TOTyJ9XSfh0IK9d1Q4UIWN6e42dPswQP7ggE90UjkQqmpc0ISRjmzPaaB2Tal

zyJtTp9/r0XtYuZZw3mAmmmsuMoWffdXH+WsXK4c6E
05Q6QM3gjQSrfZPNi+7wxALkkJzByFt1fUVnbsdo4vSzNVCPh2d86dp1dysCEZBkRivTYapzkeSZ1+1W

LTPRNwTXCeYKbl9UlPCdlzHEg4i19e1ckoA1OgfXE4OdPLRZ4PvdZyvzrIjaIm1FRCLBJ+yxasZk5cik

1Oa1Ye3VSh/j7PZIOOVek8Ol1QZbiyIcNzXjyppUJp
5UCI+66uQFbFhsncrsUeISRpZVFj0kV+g1ACRphDjozmOGOz5OI5k+chgGEa8pa0KIr6WxVorwSISpmc

pXGkjCzrADtItLcGqw1X1ZxJ8KxKeRnyk8xGcDTDhomoyrJG2YoWlGu4125DLiUpyxkygPJRJSC8cF0c

oxVQI6VIbz8n/MZx9dJGpgRvAIJY/HjeVD+I6rzkWq
Dk3IOb4Uw/EfOVHPF4Cy8myApRrawYtArVpIW0x53U2WUxcG4qZ/0a1t09rX024Su+rvNRqxr0juzogA

VhldDYas10vxmB/MM0tXxkQ2gvALmSexkklebGpaHqrECJR2W6MntZhHw8JLupb+1aUMLMsYNrh9/f6h

l4pTu1n0K8PinyvOmlVO+agTM8UTV3Rze7mGLhrt0f
jMOmbZ+cyiDGl2wxgxcxbQXb+4X1IjjmIpL8dd393EZlJUlY5gkr75Gv2jdK9UprSF2syO1g9+c464vt

wYZv0IA95ehU1KRg+FkyJV52hUECckXtVLz9B3iTAbQh+4B2AKZtqZr1rtVv7MMuY2rLpCX5tZ0ek26l

muoQSF/3wBAO3qThqddKzMnZ4QENXgSeT143Wk7knt
yDl1TIj21oPlrvoXMKtb2wjmbMSUaWLCY36Nuj7y4L3vyF4eenNm3oOcBDL3qfih7aUCUojQqx+2uuE9

zcUbeOps1CXV3SDHKqpmFoHK7P5pqK34fBcTbeD6rZOy4INEnh8mjAK5ZvFkbWS+eRRbbhRVb6xJcRXG

RFhQoF0jx05ZrDK7VO2xcOn9IV1HHPMgzbbUC/d20y
0ESX9cDQg3ZRpwD7GFI0abuydle24bew/0xI+OPd+nPGG+00LlKTa+I+sndzW++4K4aLP18x/GvWUCu5

sGYZ7kBli50otFcIgo8c2mul2+lyqlMSpvEaJ3i1mFB6PYVdi9uV2VotZm7pcYa2BZPfyEhu5D9V+voL

I2ilsFNxJXXLXAL/8hyh4uk8TmC25IsS8szEIonCvh
uR+IApLg5JLS15Kvh8fRvRo5pQ+aghaYsyVumnMNvnwonpUurXFb1VALWBXxOY2++gOe9e7Vkw8E5nEq

cUq8NH9fsxmUZgG6Y5eAB/oKnUjDwT6RQ9dE+B3S9R5JIAUE60ghObklMjQgqnPHS8OvzxlqOH5BYvLt

K49hhm58kHSem0gxoadU5mbYzobGkJshMRb/OORaTr
N5eCPoTtaTUiAN85NqHZWJuKjmE1Sz4aLpdejnJjuxbO8BT04CQPkOGBPRUjh5EHgpRhzTtSbjz+VXhJ

0z55E3v6w7FztV7j8mObu/URvg7+40dx3KyNssaRvXDooTsH1crkMRx6ahq9ExDIak1ST8ETFE9XHZRL

hvP4d3hP+MLPuWXNg4jEy4dSNMfTzR1g4IWeJO4qXa
elnUKLEQ9qwbeqcP8gw7/zld9Rm6LCtg47r8cFqQURKwGFqPT4gof8+oUmnlFbsq4izAgUogCI7orESY

cbgDAbMVwrfwnYTtXwon9F+ykRIWP/ZYhbpSfMDBQLXznBEYanfNXFC/F/TqjRvHM/1hYA8HOsBA9ek3

UF49lo32r9QecvwdnhsC8hdL5SHbDEGimWsSHnTCZB
PrnxUN8yWtq5W7m6LVcBnUH84QSyWeiFcGsdF3KVRWSAuhHS6OkpOFpzwFDWZPO2h77FieCvC++mWVg4

HbftmO8/Qhim8WyYh+f3+yzv1Ko2GvyWExTzzWj3TWtBCthQkiqDTQZo7yinj8jxULvlhCPH2pR9Kjsx

4zHq62Fm5PW3bv/gDVJ8m0vavpklD9Nsvck7QjE8cU
brpgpmeTE9CPznwuj7R9kuaTZid3P8C9LnRMLxgYFRWYbqHu5YlxQuq0LnF+LOr1zxMuUoaKSZbSIPyG

wICrsb3fzq6cwItaMbOzKgbAFlDkqLkpk0cSdRQD3I5efUtPAQ/tN00YJZPLWuMCVLibKTKHNVVAqnl5

nTh/W8jUqm0xmp7IYVJ7SJJf+uWZjq90n+aYGpFEbx
XDy4FBPXKqCe6x9HkM9XX8cZLF1JAG3A3BlBHPaMA0f7UFHjfPI4B/iJTFZyxXBXfx2iIEZaApF1BdPE

cU+5btNPHul0lYqUPsDkgriMcAdpaBXv+F1mJ9wej/mlmb01D8Rtq21R6tXJuI84SxhnsBbPpSDLbuGn

FH+QtDbHwK/VwtfwzE+XlHQq9AYguABOvfOOIlF6zO
KwgPL8pgiH3LFJSr6/Oy5DQNVod/FqcOPbF+PibaIjl5mMw5rcmSeLbAcNPreVusMNYUWS9Ec9H4+ipK

sfGnwVs3KRZjKFVZln7Zd89TGOuAdGt9gRWOxbEkNTEKPvkaJ0LPLdpWOeIQpRO7bfTBNgDYiCPLE770

aXqBy9s2P8hAyQa/ybt690g8HytAaK9ypfoX+vWsrt
X136nh5rkBx4/d0vFk31PbWaX9QvE7DKqgh9gEvpqsga1R84b8/tXpJU8l/C6ji9vZ7eEt+7kxKVVNif

7fT9mPL7NO90QkZzvFdsjtVe8Z5dXnFbBmvK4ircmSC8sJi600BorYNdZoIGE41JbPCJmpLZWBiXBje1

LRfCo80gOV8PPKbk0CmONQdTermh43G3mJFcZK55cJ
/txXKuqRoHEb2cHMIJ5uwNdTegTO8LqGTytOwOMrG2vYfGPg06uN04sfW+trroi8tu+SK+M1cZtgSI5q

M0yYRNSjJk56UO37AJynYNM1w9O1N+0FBqv1bR/BA2GJUw4vgYDBmZQA/2AocTnc414Y5D53yG579NO9

b+obUQGO1KdVL9kcBtjKeNke1OI1wR25KGBCD1RIMA
RjCB8csPCVWnM0b/yCUQ3z5YqN6SlbfX2CUhxADPbAAmxIasY+ndl3MD3IREPh2qFQ+rRqfz2CXhnJKT

gY1ZZbSqcmtOclW1agU9ox4yPeHSwU2Se7v3v9K5a5VeMDO7/1wl1Dh1IzLSp1quBcr62U1hVXp2RZph

p7CsP4tLPWkmmoZG6IRbLHqsH5cAjQ8i7AHlNlkGM2
jxZCXgasFQf8Rzc3fQdEB0seF44Y/HNES4jyede5a3Q8cm5QA2fStgyJEnj2f3CA08jsaYDg4nwHW5pL

oA7KlGlxhkbEXx0m6V7P06I+fPs7va2B45h1o5CpEXHrBzB63RdePXyR06xEYP4Oz8tByO1H2Ki1vOBS

o6aqA6Ioju3epM71Rel3BY5GLQFiB9y9tq/a0ze7Cg
ldIxgF5u8m9GgmdssnQfsSpFPu2TEBflr6qQAWhulxtEhsy1XNdKRkYO72/k+RtWY0cx1qn1GedWBFjW

8AGTS76ySszp91/h2yeMaO74M7NxMfWkvXHD3vJC5f/gnBVqCSpPCV3fndj5eGj7J1z5yKKOr91kzTwv

c5HbR3B/qqc0efSb35j8P5x707Pr98f9nKo4/TCh3E
uXF3DPf+12CTGMMi5UrpTsCNFzAhqwuw8amQ1uEwgaubE9l7lgpocR1TfH83dUzeu33BaY0/7sTu/2cz

eIqWN+xOVKYfe8u80rB91zQiz9EpwscLAnBQoCJ2S9Xs/UF/2K/TfogIvdxPG2rY7iSMi1W+cOvrOMNp

jYCTwVb8b6Fizm2dWZKsbBr7FUhAvHRHY68HmhOuaV
H7SCrNq9ww18+Wgas9yaXmbk9J4esQfvJpRPz3hF8pAGe4enw3o0+A65DKsGt2npC4cYE2bop1uEyKAK

Xs9KzuHnKFgKMbH04TAZv8ZYpqDpS84zGsH9uoUYrR+Yft15TVbBt3HP76axDbgLLr71LlTA6xg6cn9t

GZJBQwS8AcMAlgh2U1u2AGnpvR/lLT6W3YR+yfjKBE
xAEFNvZtVRUPbbwp1so/f+lIkX/sQUKqH/E5W8tazFD/hsK78m70aCbKQQrWIE7+IWVV6oao+uClt4yV

WebQdTZurXmgxcPqrvF6rvEfF+BvDX3k6hEEkRd4W/zBkW2HdqUa6kwvGcp8pAD9NCks/9Tw97WcO5BS

X8Muu/IonZ/YBorUNUaNdDT+ahFoo7yn6YZ69aH8rJ
2txBgYTd/OxWoV9ROF4gheFOuL2jJF/FhKQVpLmeeaHm0DSee0X+sxKTiZhPpZld8QMJsKlm+e6IFxVt

C/dvYL7A6bk1gBWJVMdpWXdInNNfmZd7weMQh2zwa6gxE4h6l3/HlbJoLvJrUN+40P8A3jrltYD4vaQu

S54bm2O/g2V1Hx+aoZSlTeOa4ZhsWA/7w10jDI5lH4
dd+I8Q2W7WsWlXx0wVAqJosefD9GlsGf/GTCMIetGnmO3GTRDIQb26RneIpGx6iIlpT9dR+kOHaPi6Ee

vdbN8J7sb88aQlkBl0Hf+7bNMUH22ESqEVw6+P/5ReX/oIuYso3r1uYvJhtOlWjZBZ6JyY+gHCCVW47p

576+rC0ahZ43Eyl6QICGyYXZoHL6wivk91IRxDH6/l
f/Sq91jz8cuDCloP0mJ77Syh14abEhPBOQdpf3V/G3096w7KV32lVEEq5F6rOo57Dc0mevz+iluA+UUV

05G6fs4fk4BA3Ozj/k75bWhMQV18Rm0fzcw38xhayMqetALgzqeUJtV4TKJiTy88CLGrR9nOdcNXmCWz

WvEPiTCt+dZqJzLlVB643H3xkNMPHAcg2bmagv276/
z1wC1qnXa7uQASxevIcad00Z/XWeOQAhZugvZZNU0ySOtTDu68LhrUt4Rdr+WKjBZj+DUe9SovPuKFpq

iQdLr2gEbV3erIlmZxCW9annIXpGZ8dgfaKcix/91/3bV2TWebFMM0cAGDAmJrdEKWxcI/tc+ojmyCQD

Qzvb4WBRK+H6RvlxHQr+GH2qrpFft9jU0mMrRj/3zP
zTYvL/Ky7XtBekUN3mrpqnz9Iypc+e/3r+6/mv57+e/3r+T/H9S8KtWMX8/GbHEKGHk+cIJJ7FCXBbB7

P7ewtRiy15WfvKv/VoHcN86aNWPWSzjaSxaK/RzLoBILSiX1na3tJAa4tsph27+0BcaWPKlSNGCEjA4p

HV7JcA56vZzFNBbSaJTNUZi2ZveHD7443Z06kPSiPj
hQw/uyHFDlqQ9W+vlcEhJbnmu6GLgH/rG/4atsFbWLo90G//+YbD2bvfbAuasJCecEEAnn/sDbrwfiI6

i7LXqrxjaOYLn8j4kzorXWGFKBJHp2DXFSOK3NwGriCfdR80iWsSbdZDN61v08yx2IpfljLzr3DuQJm6

dJjsMdYOZ49RvXoTNIiGVH9PAMoFTyPqUNm/vXqxCt
Ib+8Rz99Dc0QM4tCb76fKVeZiUkbf6TX3+GEhbOATRuSaHlpSnfJNLtaFoS3HCL9n7HnpbOKlqnxQsNV

NzJgLlzVhJ4wPsy9N2pud+fP0bhU6spHiSqo1RIKE8mFRfy4qTmptpkdZEr8sZHnOepaV4fJAYGx7mKe

L4Hgf0lg6mjnlnCl7Kqd8G2HPADluBhQAJPJZ2d/OT
Y5cS/x7SoIIeL5OEdGca35amAnjyWXYuT9BwsE5b5RYiLm3GBuc0U2Xq8jo5kK+BRURcnazDf48RlCv5

+k5px7+KqOOQld4wQnoEpN7khDW2UeBe0Bw56opl67bkN1MbXsWzPlbszDq13C42tB2e1dD4IsCiR90c

FqoyZGNs7UzL7/YKILKtg6pseMtmcEm6uZv8F0T32T
5LSmjvMzjiRK9mE2FGaERyko8rhh2z9hC8RY/Yo66os2bLBeTjdrDCAt4hGTo1hWzdP9PxOXFmEKg6pm

Zfz0305V642gGAFFaoRl/BqHJzVhhPGZHw+Rr6X7szfVy/TQV9p/tQsSaapDeTWsTXDZ7/7xtz8GH6L/

6TgVCX9nuw9xCsPoi4D94jROIueHSbE/Rvwp4PzzLx
7dFwy2BHP6PeZA2Gs+Y9iOBlA8bt1RzUQZ3iDEsa59CB72TsHlWY/1dTBV+wMu7dg0Q2wmxs1VQZ5LMy

xFDWYUjpoEqbuT5aIuBDhU3CQsbKk0s9bBxRjObOSV4MN7E0Jsv2sZ+rQXbEQSTdb9VYf3nnv+ZpsjbK

HX4KzfAW+L7ToBO0drcwJlqlVgR2z7+EHlE9nQU/80
TnP1KbJV9CPjUFl4f62+nJSH9A21rcY3CLYMFV6EPE5xDxbI7jaBeiDJIW0tToPkVOEOcN+bEeRNRbPA

9oc7nGpEA3aGs1RlYu2F2jSmJ7/TDYDelCtM6HA5V5eJL41GezX/mtbbWVJAIunYcblNBbLl1t+aCgcn

wfsUhg7tMwU6ze2aGNGAjP72DnsUSDAA8rGCODLomf
kpW+H1o2yC7dMVG2dM7sQKH2LpPlfPER9uc48H0GBZS4lk7ou4EB3gqVyR3/d4Ai1HtXJl+9dHDanzml

rnsALtjrHtAJNYZcpD/sOf5wswio3WJO28fKobN7pqhht1rH7thlxcCVi8QvTpvEZZ3uvgqpatqkpd57

so8fyrFeLDg6lk4QpKymB5xZkV6cc6pM6XVOOHlzPU
ZZ6IRzQbxhOISLwvOifpFcDon2cRqbTxGhzGV0BsSckIjaWJZ8Diu4UMAHuyp4RiXBpr90bAVjg0yu6H

3qJZv0ATYOedk62g3hXwMt/bPr+jvpspERCXBhCOUl8EqKPRX+up/mP6dGQfv3Ev8Eu7f614T23ze4QF

IxHRgRYudftqrY1v4DWqHncrnF4yKwu3s15SLgfU5X
b5E/cbN6VI9CBRVq3zdX7iA4GqDU44AWC8Tk8RkcnPLA/tx3GjPW+FZjfURODyb2pwg3kDBjyQngl8wh

v8JwXymy+pYK0Imt3mMztpyCqrl8LO/2fduzi+W5Fsqa9CrxrSGoM/lZa0frHVDP569XVn301cntU42a

yphiwNYAVW46mnGrepIAtpe8P5eq08OwpPE+ftm9ES
zErV+7x67ZKV7fYVwgmHLTgNAW2BtoMPlEalB65PqVl6t3UY0BPNuoY6/bL1mOcU+yWbDzOYur8usVOD

GOCBw+M4JMKswd4EWe4AHY1Haf3jM0k0m56JfvnHpsKxXKmwH/CoHgZwXbtw1elWQkAXooPDthAg/viF

MMOYPNi0XB37poDVytcefZPRORk8CcLxZFva/PHmUA
FiU4exNp9cqmf/7wgUPqlS/dfnknWuHTsHnvgyCsAnLv0sdYYrnj11VbMCv8snDWH1SnK7HfVOZBOdqf

tFKo8q4iGYQqoXE6hZ8oooqQohdLKwHmjMWm3lL1BdT/ZzOjyVZuyd/XIsnwMogSacU2NJ6rWlbAMjQX

SQwxWwrpkN5fplQIm09FsOZ1qSWQdkNlX9k7O31YYP
VDei7EZH7KQDU7rdilIQ0P776BDKmuvRmTJCc4ecIpR0GazCSUI3b9/PJFufEW4kmSPLwwYvMomAXlll

/zJx2aTVeHGMVpuAosUsP/BxCfoTCt3S7kXd4ZU11jTAChTJB40JNZFksbyr7ffDbcPrecotP7cC/Fwt

AWMV2ebuSqzGya7KKVmjvf/mwfg5EEi7h77pGunSFO
TQV+OaINjcbmWYMkNWbc6Rs0mCdqe2EEdy4EK7liqKAHy745mW9HIjEcvoy86/bWNYc6kh2ch70EuhCI

FwEnjsKk7cbuEvKeBpw6YL5uxb6eQa4r0TgSGOcSJFV9ewAo4yDKOhHnLBDpNqqgRgyx+DLbCd96Ca8x

vtO7dcX5tzUKbYnxmblBrcf8eSJ0Taf8OoZOVB0qKK
Ox1hadvhcf4nDjMYYC3YF0NL6E5C0zLbPfAhDwpIe3m/2tpF5BUTAz+JIjvVetHJLf2VL09QZNXr62ig

yjVxwm3UmrQ/EHkCEjsSjezE0Q9N4GGr77Z3awwUmGXxbbksgaeYP4jtKhahJbH06bqXIcXrTZHWqFP8

48N+Hkqs++6hblYYkDSXzVuYynzoc/ZJ20DTUM9STT
jZ/V8WvHZV3TYEPwFjSW+804YyBbHfdppCjxn7aRNv7daBBeVxRKb6dVlLjcEZIwxMX2r3o5d0dNZRjJ

a0su67FhH6hqj0e48PLYUt+miAyMamq/9v/YXTowIaC5LsBrdfDbhiM25l2YXz/Mz0u1BFCBDrh0QK9p

MDmfcjAjUNv6+4srlTuNh1gie5wWR0Noiyn4iytoI3
5ZD+4IYWxfawxH7K9DJ52TQfD9pogHXpe1BXvndQiQO/rPGVCJivwnYKexbQK/XD/1VGfI24Kk6Ry5vI

xnUkm+j1L98k5+mRH+QLh9peXmQfyj64LWhFRa0ZPm1E8EI49CKd6DnWcnKTiM/AHPjLLGaaHx8VCJAK

FBhe3P/xgka3ZwP+hEZlTX4ha1bougyYxZfZJLayBx
+eg3+UXG4p1km8OZkqbbK0aM47rGPa1IpUlksZkFqsxrxLtObeYIYuiKArXVuAbQ//CECeWrzrR16pTG

5+4BvcxQcztzwglPRVvkrPN61LvHmLWIYaWUhcayEoAX1PwVau6b67O2jLbSZXWaZ6mSn9mxcvLacIks

dUUuxPOLrs8Z9soPXYQpSDI1PX+mIGsyVTAHh/iR8d
L6AtFwT2/f2PMyC2uJ4ZEKPCyeukJMTcWYz6Ggdh6LeEOY9DPBimesiIzKThe5ug6oE6B4i86aeA8h+4

Sxt8nHrcMfN6tW7acnx+SCDj+PhgCBp8XUIJX8lwpk7LNghJUKjsuURRv3Xazj6T7E/ey3ELFhFNcxVz

RrBwDd3zb/gf8Z8hZIzV9uos7TlTepetzqdnrH3Ox6
clGFkFeGWGWJgIUjFpjRla26PKm3LqeB/rO2V1Q7Sy4xp8bWjXR4NAdYY4hXl/H9vGI4jouNvIEM0Nw0

c0Z8em7BEKtTBGgUo/DqndmrZyuFUcXjAe35yj6Q6i7+48NcoTCeThj9EwHOuebTbW/oe4EE0CQVZYlW

f05tO+o2KbEOgQ62Xm89MPldlKrPC8Es1puLGg6NYk
9YgAI2sWCzJzOUFFHkmnncZI70xykRtfyBwHgagHA0rwhYjELcBTQLzGNbvb0uN2wQ3u9EuMireVZC59

5zxKo3ZtaKX3+qRW5ejVAwDx8Bi1BS4YTPB73J03pmXCSisjQ1u5hhFdPis23iOasBZjW0fcEnk77Fpw

DPXsum4QwPTVU6OuhsUSH5jR7zTkBUmt+9YbOWtqxa
HeJjZJF5I4ENVZucLRUcH2I/QFXLdiE2YVzL1kbhejkJUDfEI8VU9Z5ibLgYwR5i7Ot0KYHVC5V9ekMA

PsLKcLvBf1Q7usy0B1ObLuz57GWuAkzBWZlTJ5/fFF5CuuaoHrcZAS/yJOQEDl9NPM3T+9hT17xfJfQS

b6Ya4mjvwITwPlshPGiI2K1O6sA8CZ2Mr4KAXs2UL3
2RzST2B+mK+AarsCZ+px5ZQp/Zld0UpLsfJ82IDKLl3PP6FUESGWM1wkXSjIH4hYMBPGVJbb6AMtliYb

6RHu8t0+btvIwxX7t5o6pusDjFa/eLmEtOOF6SmgOWz5QRXNsjU4muox6OEwfZa+3NzJWuvKqk82rD8c

iZaX7B+hPFUe3++fDaCBu8JDfjXQo5ryKwaM4L3pBv
M9KseaRi0mzj/YoTRa3XiqMK/uf3m4eORaDpgQoNIE0tbQKR7SdBMoQBzxDx9RkV1DZFyjQVoi69wc0P

NYlY5H0O2eezh4kgt6EcNG8Uwx/0VOB9l21QtzENT+fJCyZp8P4EopSq8nIWDJ8j4+TdvljF+z2S/TFd

15FDUOjJzcFHdBjQDJd1my7EwWGYQip22fDsg/brs7
AqQk0qlNCEOhIYTd0iHjo2NQou8TcbTOnkfvD88HKiH+XHQZZ+1IPxH/siUG3+0ftzRbefjfwIEiL9IS

O+SBO2cYzmx0Fawa7Tzn0jAGeyJ1thiKBpij3Jpvsn9WoZt2Mj8u7pFBEuu94KCrzO1Hw/vS+WE2ucjO

v35c3/vrAPjfEuW+nydLgHeBmy/3PrXe2/frB7TM22
K9T1Ms0Q+2gUlwGiMmR5JgOI849TCIdPm6RREPRWQ0BRY/31AouJxk9Q6QCwuomW6t4YM87M90Dazmro

WqygY6jwIhkmV5El/SA2vjgbVvx6vn8uGTKgsXUGrFL4rvhz3ulIMY3sq04OQ+5uIZe+UnDIAOTb04nr

ziFXpRXrg+a/HhzZeNz9FrcJJb1bA2HPESh72weiLu
aGFwiMsXKvzhMSpLZmfGcpCQUrKBDuLozw7Aa5B6tyqd+fJS03q8Ruf2QAo3XDOVdOUk//Atw9c7xn85

1vZjcv22hzaO/qyalm+MlOESz+D9AYMLADPLAwtEE9h5EujSkndxDsw4Z7rErMiCjqvfZ/QV0a+XXjhB

hkIPNpWnhU+yon2szheyw7IyymvhhIUy/7btqVUhoU
fRbn5I5UafXdu8g+uA9cA3f0wlb8qtapK/v0i2OoP3l0aF0huhkcsTT2oPxU82YA+93FrPoHcSGmp14D

6pic4je1XhBTlrC4bkvrGHZfp1vanPDyVHMxo1MVtJHZFSPgQM5fa572SX5lf99Ry/JnZtjg/5AaqhbC

ESZiPCM3X0wQMU2VEHB2DxaHn6bwUQ8H8Y45cyCHW2
i8JIhToenxr6EI9YOyaliif/L411jbvZEB3hCgyiwCsqAGJpjqfoMdideD8LQmN/9J7XLJ/vKNAe5RaM

bmM5/xUtDEwTBM+8ggeh7aZFCj0q0tBhP4TxfU9jQ/JR1WHQ5T0J3RcR1L5yJN+h9LVnpgDlIwJd9yYR

44gFDIN3W6PQc4Lw+ZYO4OfG2Ke+J6M7GVJ1KjjZlD
5kpnhTXjJboHEMr+wx300QJKV5ui0X+lAP3DW4ptxcKrPtZ/vdSmnaqwv2hH7sRPgjfGjirkiaqxN9xF

CT4tEF8ucy6Vauyi7x/aps/2+RdLGkNIbA8hqk9EVcO/qnBGdGAtHBhk/Szgu1GlMmCvyuCGNYtVal5P

sTWGFoJl6zYep8pHE9qwL0dzVBrCL9e8lHqusQ7Qlr
a2nH9rJ9oJuvmaEMckM/KAIcNi1pLKQEQjN45Xgc2lJftgsOUSgrLnEqvZUhGPa8Us82mFzy4rZGOmsE

tSC1tj8yWIH5Fh1OdRI1vP8LSFs321aLZQimH484tzJHy0S1kqBCEOge5QCkpEc5pHHo35kuMr5ZJrhB

zZY9+QxAGnH9ZjuXhaqahjHTrycdFbt/V+IO+YLDB/
l17xpKXcd05vlf8zeJaZlr1hMQHXacIPEFn1l5oa/2/6DP71BFHQIvdhGAwYpcwTGlf5aHFSjUZFVm1V

HtxkGEXmUkGIiibQLuIarwmZryEoxlKeK1U/o/K5XiBajKbMa3qgLZkLaNsah7slDc/SR7JsLFLXjw7p

w7NsvVeg2OVBpM6fmfQSoEr3w/qgQUHBFCMqnyFj5V
TMTKPQLXGEFIAnEfHQhng5tyHQ+p6KO/AzcpBsGyo5j4Nn3+gt7BJmRyBdwIVGZRRULvS8ewRTgtp79I

8k3uOIuXsvmLa6nGkmdG4Kbp5mqWpNIhCkI3d/gI2FAiHYUQXkhkGbggYDOnDccbyY2eIDoOY69FDf2l

DlptQRioCRVntmymKCtmLIgX3co4YDyMJNy5ktHZDP
eWmLRu0ChqMXDkh5gdLAKKq0QauE8UhgNab5XnNGkL2rhS+CKujde9JKd0sD2kVO8yCoMEo+B13Beeko

+RBnpnf7XEVsd1itx4EM75tUGTSQqcKTmm6UBmG4eHbF/FswUft9KjJJKlxJyOIjPqtzdkmTauQCNsPu

bx8KE99iX3Fm5LPSOso8XwM13cEL6ZfxeOmuV/rMYT
bYY2TKup4FSV1X/Z9l9BIkcjihG3iWq53VSQbICkevBmjtOmc7+ZC63rPSvxahTtTcfyF6buOn3Y+0G6

jPS2T7QTqGYxG1goPzB2YnlI0jgmv2VDTBRRWqo2YWKmqfU34EcW/xrvzbOp+Yh3Oj9bIBI0JSy5sW2I

ef1/Amhyc5EFc4vnhkLn1lW/FBDF739z1bzpvCM3+E
ZAj6Cg+Gt4mND4SlILe4v59Q6C+WkFxd6siG2nMQkt+lQIVr/6F0aJ1vvTCWNNNz14/IU1SS4DSyn/UW

dX6FUEYUYOnlbYpWcTGhL2hi5uBJZ14Czjm8lZuFVNnIgtEUe9p+Yuvm+FXFkYy5w3c+4dzBodT1TXpd

Ph/Frrad24rKAyWhdz7RBalaobgkVVwFDH8Z7cbEhp
Evj4zszhhv4Co6wK5yzCiXpzkT7kp3BdRqdEuAvuRvRflphVT0DG0ppM3btT8VoFsZqV7pBjhdOhBOaB

6rupRLik7KyN5phYUwIwweJalocv+K/xXMA2M+IlZbQLxlANhK7AVFGQZEYkmKbTzvnxBgL4Rr9u6R/r

7fzKI19Ka+xWPQWVIgIPeevlDafywvkllWsz5bKpNk
8vuH5KaiUFDBgnooH7/5wDkp358lX3uvR6EtEfS+dpviIBL8b0uIDqGPs++K0EU6lu9NFJxynZJJCglR

97s4j1yBSI9mGA1WrnSbkuDsTNa7tqg00M7QQ20/+rm0jGRWfTaQEOcKMpu8Tz3LFJFsV13agJ7oC4gj

2OgPs+klpO0pF5sE75SgSqdOzPQO48JiEbO0ZwApKe
8Dr2Jq8YnPeEPpN/BAA+AFdOID0tJSL36bmvx1Z0oefK4uy7mnoGsxqJisld6U8Avt4GzmZWXJwAw63I

lQAGvwmjhHGRRUXBTMD+MWFC35SRiV9UsNNGbhAk9hFVPW54VTaDETOSKf3BCDaV14f0DBczqiKCCbS/

9o+ISuuFvag3IRxdYPXufeW7f+zPb1uQ4nQm8vl0nb
e15zER+mYxNxB5qs0V51lujqy7jVTYOK8cse911j2Qn4JVhcQn8DQb5xgf/aUHR/8XdxDPL1bWO/6AYY

Z/pMdPvb4yUZnSVahigWKw+FJqDXZcdz597DD5evgyx+Dbq4NpAziWNWzkjkhChN9Sm353qBCSJ1Nk66

IiiN1HzkS0fQHoiNH8QJf0OW2i4G4HbNfoO1Ruo6Pd
8PA2b76qEu1hc8QULI44s+KZLLEMDOHDSFWgg/gey1DPpI6Z76v6u5GepxAD+AaTkixgFBul2nGsuHJv

7GPEpcv3QMNpHYd14eb+bCVqqja74V+yDpIVzmpChd+nA5ZgU3fh9nZUiC1C0GmUE5iYJUE3Bo77kub5

qM0qRe5EITScVvgo/hdW2XT1SgG0ukCO6lmpv8h7VE
7yklLJtDxtUyiMlpVj80xasJxBK4wmFsp5U1Vfy0vGirgvrP6SZ6JmC71Skbac+9T46qImVdcYW9Vj3f

R10QuzEf9pQsjyAS1JR6T0q+zez677ccRDPekJUoqRFUPEq2YCbKxno89S58KTyGFxIKLC0u8hJBWtM1

siCMy+TniVrwv/7zOoZqtuy5fAEpX0/OymwM4zgnuA
azNrJhm1XmL76f9s/Ujk/GpkK7nJufNLAMMV5YB/ewp+HLJUc6Vi4M2pfthoHvtlmXX5BxmfGUDS1y8X

pSo1+7kP45uGzN+DDGNrwtITVFYRnjJIF17Cqc6zRdGtqp32LCjSy/fiP1pl1/pisIchWOTa5EV+ggW0

pDXVB3+xCfjAa2uJwkFRgb40T+UhGOaI3OkDj4hRmv
uD8xOVsk2hIGks2zksNTOzRGstCulUqq5spgNO9Qub5603NbXowVOMTGn7g9OywH7sMJwl35UxTY5sAx

OqiPwkPcbCfLwt7UZY+UPz2WUpSljgEJ8iOsYQCxu6JOt6yYRl6r9b50OulBpI0ZfELklwg77WDBPD65

zAGjQj2KbR2BuMPdA1N1wm3Ld4fscDYBJ9vpP6NLys
48upPC5XlN/mv09ujGYwAhPgbflyw1H/EmaJA2FJRZkEnFwOukVzFj7+HTEUxS3aSYEL0sZBgmhCEiXh

lU4f808Ue1STJRwfnsmsWbCKUMPmKdn98cu0kXiSQjmtZQ5dBHU8SlZeQ8WnfPLKIT5A6l0JD91gULUG

S29fo7c2Tv9Zavh//Jp0vxo+bMQT1wwG6loyxyS/QD
8/rw1/54MkHx4jnmPV1pZjRoDuwfezp3PfH500maxYrgDk1gUDawrQhU5UWQIHSNUbU9mfEIrzpBE9yc

njv6W8jQVn+SwVfiTx6w51b5Jr0aSm82V7SKPO8DbskgtUMmANVhtWeJ+oB92fKIeUHJfDVroyOX/G8t

JzxFuamDCm32acr8+/R70l8JJNA2palirCvOoyFcQD
1KiIuIbep2+qyJXy5GVZp+NULy0OodXZkPjYeNCSjLN085V/bxisgB1lL3J7LhQH1puzLbOy2tdWLTxQ

8dglRXE7ManV6nd6Q2sPlWV81Mi9fewThN1dfE/85Hbz02mOj2ldtiM/me9/oNrP3kQaz464fQ292tjI

M3zxb/U1l4/rGgLM8D2SQyoz3fsuAvtbbUlrAsR+Ym
1JM5qHSDk5t81xhk7viVOZ+PBVtKW6Dc3LKf0Et5FjfvXI9f+ZuDnuFkXQUKuzBTzXw5h6znx4Efkw8R

8IvKLEjP56AKATm4vCfEKHnuS7Xf8kNndZ3PwauLO6MFa3rgCNy5g6agyl/1GFT/LqZ8xevgZXsbLk2J

Fxe/vZpaW/XkRpQvB7nHYR70N3OVm7XvI2Bcrwy4p6
+gLc8/JGQH/0VuzFg9O/CaXlfKAFfcTVldQQNwXE1pGd8rI9bN0Dcr72FTjtUCAYYOcDmAzD9h7Rqb1x

3+MOA+Pcu8swLa8taxCXW2u6JD3BcrGtDj6jGmLCLTZr/xVfzKITMWXBBvfqYYYd6i/ZSmMzrZX9hhKu

J07yqFqROn2eiTVRxjX8AGERxjAXpyy7Enn9C3znyX
q29O5Af/sMA+wd548Xh0sC8Oq748NT1qjmygTfhkuR/Ph9lGSVHGzc/4fkhnuwqPrmwqkQ/AvJWaS1Xz

762djJ6ijeG5+OiNVFy/dxZXxM2bXLcmTzI2DRyey7h6RmSLUC4bdcf9Npj+ZJXxx24GNV+Gdutptos+

m3rCQtEK8FHKB9D3XeTJjuq9P2npLxlzV6hfSwvp+J
wS/XIB2/90g1GXhglQKqT+cORZnchEPNlAy/6mGvT5y12DTQv6LxvATbf38xIgxVq5qd4ovMwQ7Db+Jf

yJeVlvDkh4FRSjYhsSifOdl8vAYVLbXrX99Qagp3a9Bg1HqGq8Ai0DGJDTT4PxB6k3vptKrQiHpYizW0

aSo09lamzqkRKTiLoYkn1+8u8gEKTJ0Ripdh9OUeUQ
RbP++MruRvoRWSXhfswVZIS+bnwEyk4qjaXwhNGlV3AYP93QlTYftsi69y73iXjJo0fwUqVkd6FTmNRL

aaI6auIOTOnU7brt/Qgj40m7XLs0uxfzr3oXkyzryGh8ibAyS07BYg8lat487Z9DbgglPdjL4yCijh7Q

Um2qzzreHu3A/+f05tJN7bkM1huHhhLEO1e0MZ5b5N
2u8Jj9rB1yrd07/J1vbX2ov+Qvx5Q/UOKlAh1w/2JF5EdgnxMEmhj8N6+uoNIfRJU53l1+VByHqUA/4s

8ZmLCY2vVCWmuAFXdZD6SbAWdsj0rhY8suqF4cspFPDwE5N43CzWklJtY9NTLRqsi+RolWZXO3xjerNb

8QlkrnJnFoY/uA4z0A+V23a1EltmW+mZeALaASarhr
7RYgmXpPp8sBJPeuQ4INj8FLj3tTd6XaGZSkyL10EjHIecGaDGOp1SI7idavb6CdfwnxLgYz5OOUP9op

oTg6Qd8zpiVhn2JasV6pF5+g0ZQnfsJuX0iKRYklTrSm42vJTvhQpcePoSLKCGi68+Lrxmoux7GPrJj8

CgsF0EO1x8gXG7Dge8HhwnDaxwkd6XJ4pZ16ccuhLH
cReS1rjZGp+GywiKviMAbfJ7nZ35dkKzSzgJpSuM5Mze8ZbUuu4/WQKvY/JGyRK/kbwE+Nd/95jNYJkN

DvF15ErkWVBAAZpJP3dB1bVXopmIE++sg0k8qZMbOhsQngV/gB3ueKIm85DE7BBmdN+y/4L/gv+C/4L/

gv+C/4L/i/Yei9S3cVffzGWmrHKN+UkXXHHwcAaIV5
XDuohDmmA+n3t7n/XPLHKQfvG+nQVLOoTR82VR6DwLQdGMh42oxg2M9QEW+eEUIz+DU6bunRvnvJrAm0

0MBF15gxQ2pOGyONof8/K+k5QtWvxCnv0nm2mUA8+dpjxMBPjOMeUSdx+2rAL2zaKlv+kz8YdgSYdTIb

7+UT3/hnTDT2shh/q/YIf6hKvZS0gD44IQs2/ND7VI
qBIhTHJIWERcVTyQVglzYkxlJqHWP0tNad6X+o158Eop+dioghcMy816ZomYG3feK4w+T7e/OEb17GG0

Wfh4YMKlx2uiUUTTCrdlGANBTd0c3BEv6yilaI47Pk2tMrLBsbHcng9mAyzpXM7w+e8EJc4rXix5FgEe

TqMvq4ezlD+X6Zrj/ynfKDsnPLJpsmHacCnHLrjFZJ
Zk/Jr6qW3TrtMtFHJ9neaA0kZ/Fj396LusG5xnr9QL1z6scL9xNKmtWOm+55dnn4bFI+gcdz0Jw3hVe7

gOakB4+LfZ6vrkO2oiS4Vzcqgnkj5ePtt5htqGfKRXf5FzCz09ULz7Hgr9PO74EYhHxH7JwP0TtmwNe6

v1256IQ3ShD2arKGUMvtkoj5DQJ+FBM2imLCMskQHf
oH+sAQqNOfz/xi5CCfMUbOQNcERf4LSy1v9kWCimuHhleSeFKStrGxtz7lgviZGOtpkP4+iznVo8WJOi

H5YRAihYCztmer/pg3ksTRn6t21LQyb58r/2TFxdB8HPVtKJ1Gcmz5nuaoDaivaED8aseYp3B4AH6Swl

pyzNICTdxe/FjsERdGFf1ahB5dfvoDnVsbZL6KsK8L
yhg8Zc65GcG1snKH6Ik8HdqH4Sj9e/odUtr54bCAWpbVop0ndnefKfrkcGaaXKUWpEmV96k0seqV1qVs

e6xoFf4K7h2bZzhchi9V198H5/V022p5rz8DVIMsubgklz/CjTIjzN5TxdbKwSurud3MwrgVIpz52YNf

qHXZSNfQ0c5ddJ0ms/2qv43+QgJWUSKj5UiQARvuTg
C9HhdqPjrJ9VrsawCDEzym9ByQi0/Fn7Wk+qXaiYL+RXwcJjaSUIgZG3dady93vTwKDZv1FpB+95/Zyf

WoP3mUVeWN0C1rMYHfp5S5CHFMfSo+d9C6pdW6s5DiizZ12lHtMDGMJ5c6yoLAy62V9PA+E1dnKDUzrn

ZsregyJYkBSKRU2emkXXqiC3fYS5YKrAgsLXvA8HIq
WYJ9MkEPKO9NrEVVpxShL5v23RnHZ4yqTqBTPCDxt1KP7Tnh9abf10uXZlDJk6nNeNvb6ZhlVvCU/SVR

+ci6PVFBN0baRgNikJTvzoaFmVjEs0IJ46ra5f+tN2Yi7rwZcXrbIV7Wv4TvSIOHN6zgO7vfb3AB+JN8

oHYFC5vAajP6DDX6sYAxKJymXP89ypi9Sq6tV0svjf
AAMiZwpn6CwArNhwoTqWtChaBczgF8+i4Uhko4FWNUkgBdVRC4Z/tyt0L/FrxZnJYWGT/jc7eDELoWY5

z084e0wkcOHRLlgq55cMywLWBinL9RrgS0CFWgnXCdtihwe+G05rFZQtDv2jG6D2XnWyV8R4M/TTzpO3

2e7/TigdVzUum6e/9BUIXhWaJ9s/eMjqV3MUphW+PI
RGE/l1ojKbNWdThQ2K04UMK5sN51WnKKfZOCBddpPlD/OeYBmXLYAR2AcQqU1X0COACqxLS49auf9mzt

SbQWJ6WMrvqb6JLcTvNZNzM+3CW41olOqpUNrYZDhZU8Z2cdZbgB8/zVmIeBJYFHWDISl5p7AdCJayOi

WXp9cJlX35dpd8MjIFUDG09i4eossSZ8CuP0XBDvup
v5QvxTAcdWem9BIACN9LHo7iClxEj+7nRBy76flA33DSv2PHpqsGG0G97kIn5vU5w1okSbMx80HVX4lg

dTn6WZ9RKJcpsXM2BYE37dB6lC/gVJHeUXvUmodHoiNZywDTrEKlxOp9sbT+fxHLC97m1TF+oeEEstX7

GHdFjgm36NkgXl7SWIErbkhpVu21NqYKNzi6Sjdb4d
mCEd+yW8mgSgjM3Kjguhn+XoXOqviVI/W8qxUi51GjRuIup79ItzcdQnsW39H7YyHaBJIvg6/eLJoz81

mj2bn1ggOMiCwVXaIy6t+1+e+HxbcFkI4BDoGKeUcpu3XZW83sOapAp8Dem1m+uQPgmwWJX5lsq4YXpB

2IEJ+ejpEIj9bRjAxGHLzwM8oL7TJhOLLDOE2voPTo
6PfZ74S5DFlBkiYXleHUVEJHcqLrD9iWbyHIAJRkSHQzspvWEKNfvS5dqgtYkDEXeSR9V31Sw8E1k7Ok

MWfAg4XgbhH3cxSGkByud9+LboG01PEG0jLqJEWs4gqInYHIFkVrZp+8EPP102r4q/iGJk+TLpw7KRE8

UZeVU8x9kDc6nFoAV6ALaaJWg4DnocGGNmQwofDjnr
VC15XCZT0TIQcaV9zepipwG5mvHMdLfwWFq3W0NYFbe2f/QhZQe+KiYG9xxY+jwYkDxJBCvOXuyD9HEy

beq74JZHb2/MVf9buWH3ivzX1tkMcZqBSZ3gA4Y4HQE5kLMmR6WOM8MMb1C3Mst/v9LfHjTRyeRm8H6E

hWGCy/asKtXPU+TUlhbKolVnRGUmbYRzBF7todOvTx
xLou81XUELk4IHD87jvH7rdYmJKLpLgaICH+ZZABKBqLnqPvrhCubIrPO+qYURYsflxQToT2hUZ1ZQo8

e/oKcO1rTbTsQyJ/wx9FcZr05zmr/bltvSDqY4sJysNF0KZKdKFXnaglzuecH4/h182kiZ6nGZlWDWKG

c6SrsQGOG/wjkw0GdCBJntxC9ywwY2JlvuQf0IrN3+
bUQ0sUi+fCT175Kqo0PvwP8poCXLZKCR+w3c2rqM6khB2ylx5N9/Pow0yBp22wSKtA2K6znN2Sh0lTBN

CYmXJ2DZuf82qb+Fr41qLie/UlX1gxue/qVsWi5UHzWQB3dVYBzIUFtLvS/4+bWxffGUHXrB3UIsVO7d

DpSttBkoCwQ7weqs6Te1UB+axINfpdKyHKW2+3iuZe
G33az2Q7MThuUs58LMGM1U2B/esbk5DjxZK3IHU1gpOz70bjbeSO0kum47DSnnUi+LcPAXHmiojGvPfV

9iC2ZQEbQlXoMIS5t01Pr66ked9Kkz/eFcjCsWtTOlRTw5hRvGsByGXtizGeWFXnTf6shMyDTV9eKMue

H4vsao84NW2dCrhlRe1TysciqDu7Lk42NdeSQyeD9c
0Ix3KABCvktbzFcAcIcVmeJLrs8tEdu3xgBorWnFF25vCRhY4hTBdvyEpxgW2hwtEoQjKMDWJMngqeIP

dNc7TYz+jnEZfQBfc5YmUlfSgP+5hdK5QeeVrIhhIMMdgkI/0VEpss+5WKNew0iOhPYNoJ/rC7Zyff3A

8c0npFbcHddmNNhGhuCBGs4pX1Y1yhI3Wc8qiSfjm6
vXzQDTszSjqq05bd0rQRrekT+w+3O82Y6vRWa8K5O9fLaXmAFE/WRCUST2zYgJ6P65hB8i6XPgRkBDOl

HtxtIO8v/6Cu4ZAiFFqs6cY6mDTB6qCUIYvR6EMyygCMQsYNXpgo01QutSR2fISD5V6YYB9xuf4MD82y

4E4FAHaWuviqw5+tOrKnSMCbOIoIpT/5JnKB8TStiH
a3AtVHIuqL1c3RFFJQa36D4l5ktyVzbbetHB0sOZoCczgvC/BE30dBYF/QHEH2n9foLKqCDgtfwfbJ0S

m5ZPfSQwXqa1jgoxZHOqYcBjq/qraelpJYW2jpCMMmM5rz3NXO1af9/yRjTyq64e1eJvha34Qj6EZUZk

1QWMuxB4RXx22lalWeav+nBFS5cFKlT/Y/xO1OUJR+
MRs6TBOqcS5ZZx0/LeFqxfjGB8YMzu7XDN2lceuhcuqb4rsRbkBdV1CGC190GmeWgJ73kNMzBUkbxdPi

Mxw+C9wXKgEMs+kGNKeNe96QVLsQV/Eli873rbx/pkLmX+5COVK7ofZFjjmikPwaQKiqTImBD6MaHLqm

+t0gtiUJzmAm16XR2tUQOd1wQBqKUpRl6KokL2bBBq
WuYw/5CZVCk80nktZqn/tRZ4PY9b63y4ik61qDk9DXoZO2FktgHSqMXoMZP2mM3IUvJl42hm6a2GeDoH

jnUZO7UuBB54C53iy5X5A7VPI5UWPhMeWbzeB0Jk3JPnj43HuPaSk98BJmFsFvVkLaNBzTi/wM2zPqFf

DRjuVJyK31/JbmjqtUU35iPkyK9A0mMH9C3ItPnH34
3Cds2DAjA2HWHJGe99Uetf5BrT+rKAP4dMn8gLUwPYjo6qz5Cg6lSh/Ne34cGRojZg6ZbV8nvrjNW6qi

if5f7sXbNRdcmm7ZnfeAJjJHjAG3TxKsSDt/Wx7S3WuNu5lIE8dgO/XoPblVlHNI+57B1FjmUyiAigUi

4NIs0tpGoMVnrQz7c4njcwo9ZVfmOD9QG+wrB5tAUR
Y/fFWSdnUSzg/nq43yozGuutlzakS+GdLuDqP/bT9AFNHQCQlxbi0vMpFYngUbHaDkYlXD6lkEd1N9E+

LCVW+obUg/E+UmgXuqqUMWZT28BcfWbnThN5BcuHJhzumEbwNPqANcelWncuyvI0beGovcrX6rSM/9HH

d1p49qdC8QYOKjQ/6cFZmgTU5HYDBzeI/hwvZAMInN
YYWFGh0ij4MHMt6QaRpKOrzIdK2CWiTd27Lsm3izhZAQUgWlcCT26qXlDV6xQTj1kLJvG+Scl1OoMgjL

NE2zfsuOTY80JL1cT4I3B8Kr4CDVPatYXDlYMWJZTcgoqsAlAyTAZIvsXveHMWck5DowvWu+YIv74eCp

CC6ZoyA4G7jFK2FA7JeoYJBYGlkmlsCSWjiP7F4k+N
VyLtApHnq71czSY6FxDgMPtMfjdtlKs5LRMjbq0PyM65ZqvQhCY42TfCwXchr+4CVWVeBO7yZdk8UtXi

ybjbundwUoqLpEsJjxYeI9WT6UPYPakRmz7cO08hV3WM2yBwlQZHFtYdPCTUEaCRd+LHjtidJgy8wu/W

ZthokzBeuJAiPefGZqjzfMa0urqy+FKTTwNCJo20ku
nq/5ykS1W2wqzACHP09mFHyxsD+EwoDgGr9xmOU4NVWM1mNzusPNYPD+s/9Qh9TCXylV7rJwMQQwNnBV

mIC5QHc4I767d52sdJLeoiYd52NACBYfK6BAaP3gIJGfQR4Um0gnbTlJiYwb5JpljCHr5Drg0YSiQ04r

52PcA5quQQdsYwI5AKU+CgkD4zjyFya0amPhGwRE0i
7bSbcKK3Ih481yURoPtlbevoK8ds7Jk58A4XN8zDwGSbE4KOpTfXGszmNXh0r3FZHVshs3LTQvxsYfCJ

17fgTnUZTuFo1qrchv8b1d5tbxLleVD44W2zaAIi70DPssV1MiDazwdLAWB1GVrq2AB5jR2H+ncPSYsF

Yg1JTqjHd6SkgHqtE2Q6N/MadXIbVMacE2G4THdkGk
gxBbxoliuZp084CqITDBM1F7CWaWdIg0t75+7WvoBqr1IimHHlFXos9scrv3DxtAf21hsqU1NEvz1mKZ

YoH8QFYxwwhLXQs31cOxKLiA5hyGda4lsPWFVne15/6H8OiEaHaRwmc+iNY3N7e8KK2fTH9NhGQ4UdJj

1eimGjHQvYze/Vp2PvjLxEk92isWT6rSPRltauAJbV
gugW/dSh0fNMUZYd0MEOxis6RO9Ap8FiOnHNlzneVvsEs6p779/M5uOy3EIDjZ0alH29Aln+Bvhq8ht6

hnJSGkl2DFGo/hxi+q5wfeHX0RgNs1CgprLcObM3a5+3ICRdK9iZUabip4DssRWlHzZiEtOSF6tffkl3

7QDUBZ7Ko/rLOlP4iPvkD7fBKKx+G3IP3ormEYYAlv
YVmJyge3dBIKVgwbeCUZGq7/YqBa9ybNLTSnOxFQc5pbuqpHkDF9ikGorbxqyUg8RoxrXE5CSX2ycEC9

9vyicqU7KasTbZxWZRDbTd7F50KTP3zH7oKCxqFbylLjDPNHNgPdBp1h8tJgRtRraBOdpygcnN40gEWq

rxtwHFw5eEaNQUbk0hFRlAwIL2qmXZbdrdtrRMtkh6
Z2yHy4K06etTwe0R82eZcxEDXrJk1+qaD8IdwE6b9u0R6/9EjXZUw58IvD1pbqGxqBnPR4zsRBK4Q5jJ

c4e2MhaGi2utQ28jVmceKxjwmrZzPV+89UnT+Pykcj9pP0mg0Vz2T4UAsJbgNhBkYHHa91QafkHXGU83

pvHalIvav6NNEC85akRFKE5ITaUlegBcMOVROoFcAb
AJ27rGJNPi6hvaE3dUqZZEOI+bYkyqDOnwj/0Tj8WZKhxS0fUrNrqqYy1AmBCuLZENE3R9+0Mr4Dof7g

iUJLbhBWYv1/bn1uCFRSDThx6p2TCj1Jsrd+wRgMHX3wgVsr015Ybb8b7TNk4eK7c1lEcXnGaS0ZsuLy

sbH0+b7sApyBn0hYw/tSZaoRUPljzKDLcxqMcyyW6c
k/zI8PnqhHGiseewOwmmweWFQNb3GU/vG9fWOPKchtLHBC+VYy/QeJ6Byqvga2c0LucS6VDH8LWJ7qXz

muWyJi9EjL+ryJrlJ/S69WhsXLsG4sH31DVhclvIjtEIfuXZCGLeXkoKvHXHUltNlEOFp+85+6xYi5nl

eW2QTX9h90B8+HfRmgpmKNf8MTHWDypZz1XGX3Bw2H
Christal/v17zXIsFLfAThYShvs93yrJ713drcBC5xBHOb2elZHSzSmEwVkwyaZ7z1zg938RVJVmSIrgtX6lE

xc5Q54VtAP7cY7Ifkff0Fe9koEadRCer+5kAqKzvTD4YS14CA4SK/5e007Vq1gJr6lH3hZJP79D9Lqbh

Vd7s4UCKTrlxYriYTYIvVNt6r48y9OQEZ3gBkTfXm1
vqngIfS/6IGk/22wnKg2sxfiKJPOBnYRLN6df5Pc/LJV4jAW06ECVcNReUdxC3fuCz0TUbq2pDjHM82H

v/MCQIdu01j91ttn0IMqIBob2X2GnksD47C0rpAotalPH6ZvtQIaVgXE0is76654sdqKp3PjOvAZNv8y

mFNYSlh9c8QVeYVqijxpGXgjNx0hAx4qTDmXetQkEC
9J8pYBNF/gCoLPYQefqMO0wMZIJudtGwqDE8axngbHiCnLETQX/LpnTRlQieHqpCtKt+JgwBrIv6vhI6

Y5ZcijnY7pMAhwIuifFViUqb6cHZn+gQYI4sfeyaRH9MP7edFJrW0k49NVGvxh+WyGRszxNXbqc8wMqP

9Co8TUJ6jlsuSJDWc5O8JL8xuG2fdzh8FWtZ//mW1A
QMivbGcA80XK30TU6OG9kGBSMsgDy9pFLH+SpNMCdORcU4MbExeuexRucSDWgcR1ckSpP/t9YXE/ZTXN

vhzfWsSBc0sp82k7f9AgU04wRYZV18cn+EC/wOVbathQEOp2oebC1BTp/kcOMsyMpJ/q882XAeDpw1Up

vdIW8LvYGFcalS1zJ9IEb7qdYa6u12oy5+0bWGZFOE
ZK2ytVozNsdC/JygUfNqlo6Ts24wd+OwIcZwhqBmEy3fDrJd4ZN/z3aAq0cJ+KRlpM+sz1AdrDCMclYo

YudW0U1v2iiH+89hITLpdNwkL3i792Tq+JuUSMBXHmYI3kXP+q7kN7pzvx8TdW8ZUgArBYqMV7FeSwMT

9zXwH1LhxL7B6td4440i5PusRYYjgzAoOBJxFyK27v
paHHLqjpKQaeZWUxq0X+O86ZAPRyXMqL1DHHJ9O5J5NDFlgXVXpI9syiBTtnopXNKvKrhx+Ps9iumwRZ

Iph70kE6YwggcF5FwlNXG/NVOxT7I+JPOkGNMU83jgyGiecqJjS+e/nifzOPiZmToN92KriUK+TpCj00

XRKZASu/o65724VwmV1Lk8Ef6QK7VzrylhjIt07w2S
SVsiAnfpgAitJAIHzTjl5xtwkwGAIENVl9a6IR4/zjBy5j6n5CmtYH51p0ypAtrGqVp/CmDtlSL/aPX1

CaDseyKPWAYzJtQb96HaY8WVkZEeT/y9o29zqx0sxx9t4paY5sQUNUyIRDB6fouXpZq+K+CZpVg3qkJC

tMMPiw+i0limt+KgZohpC/sjisafUVnYz7GDLavCGz
3yq+BF43aetNrwYbXt1ra3yJWq1IGY7ayOWozxrCwb7lLbLZ50hv5kvVd9w7aFxr6X6UNj8glqcl9L7x

/ShZiUF+3dsYhaLa0AYB0+C4L/nbKs2JFhTC5tpNm3IVUd0k3jY45seMS+rJnJ02mYO8lhUPyzHLwtdz

IcGOL9kR5rjqf4LoDwWNw2NJ4gsvM5DqKcryiyiYsV
8vhxl/vWxkWY+bQUU6QGpF2yIRWgz/65fnijhvopzu1cD+5leLdJcvXwsmLaABpRf2GrQs7dbX22wJL5

hZ/eRwCxY5rG3DK5P6YJgtdnbOqNxpSNClfKb1MM2qOWM8pY5QYyL4tOYfrZS+jP01CBqQ4v4u0fiU1I

AyNet4Pty+KIr/l1xK5KJRzFoXGhSrzv9nZQhmeBRw
1fvqD3o9IZXwZHPzhBdwVu+4ePWbbwWe/zZB/U5f5dA8FQB41Cq0GwKUFcAVx431zvwgByAEuu3BNv/+

L/eqXuskpzbbYxOS9O9OKScjEDfJsWJH3l2ZVXxf8PfniIZbmHk9b6KznBMsWMxDvc2BFLMU0/0+7zfO

+X5/44xxznh/7D/BI2DqncfJCr1vosw8k1a8iBB6D+
EPNKwEdr6pCUx1r2UjqruH67xleX8obTxWGC1vOmesq8D3XYXdjujtSpxHhhB7l7vJK4C7a0VN9SO3Mj

eAk/AzFgzAWXVLnuXZ606VlPajflG8sMKtw9Gk3ipf1GtujgFux7TKXSu/yygtD8SrQqGBO5CX1MPL8i

wVcc/6gB1h83/o5IXXprbPCPggzNggBLJqW2xvX27a
cIaV3OHoK1ijP+Zh7+SllOsnxlVeUZ9fDiaxLyBVX0AiElmLhNcX5W6OS15bYBI2VW6Rbtv7oMiugr2g

Kh6e7xjOcds5iqLvV2/UMYaj1GcG35H+Zp6t7N69DNrs4P584hQObjkj0CTxLI3sE0/A6D1cHeZzYlQ3

XE+vJyapqvVQRJdi06ipeDTX2Ockk7dPm/SUKpfutr
iZwpVHFNl+jltKhR8+C66/W6rcyeDF05gBa6liWsPhsLfGlzh/rz5Sqih4c72k9mej/oKmxwU34wKwD+

R8h1Hu97xOC/GDXUOwXTQa37VOLux+PlgU4XUzvEqj67ZQ//ibwOGKWFYnxDjUGCtNsbeKC/51eHo7SC

hqwlrgkU3MUT05NY+C1C0pVVWx0iPxrM81M18KPp03
87RI6F2mGausm5pEmKGVBzqkl2J3mFbkYcM5BxMfWqr/JZerHnMYRYCVDfCnZLxunaF7YEHPuDHZBU2/

wzLuJHIFMltP2VVIvQcF/AV1lq+92svRwoU9yZEt3kTUpGkrtS/jctwDYIvc6xXmZdMRSoe/k+Q/rhLS

LGEtw3alUY/l+b9WL4bUQDDIJgEH/70Y8nbzXzv/bz
R8i1GStut1x7z9e3bXg+v/KB6E2y5U/o/h/xj+f9+w+r8N/w+LY6IU+Eir3MrQQbre9oESrxbBq2kJ63

buYWE820YXQTwZQPoc2V2JzQwVj9JqpXulSJN+FmCTnH45ljMO6fJL7KH6zKmZ9+TfRbMfZT7wCnX2wZ

Pg3sl1v79C8dSX0OdTSuDDGxlw6ujfjNDsh7UE5r/O
3vVx3cofzNabFujhg2m4PYiHJSr/dlrHml2cCB6qn50r5wlDEvurZU1HQqsL/ej/nSF7J2VQkM+74gf2

PYCYbTQha7SKFc+ggT4ceDQQNc2AYwTKlpHuJ72JCk4hvPx1xtjW+jhKbqc9GQKeARzXyJcHo37kwlri

F6AqKzPXWdeahw5dWJiMHgjcVrAzF8dW1xXH8Mvqj3
kvScswrH83/0l5rIUjYctxPfHi2okI2gBCg9GtW3iwTilfybgkh6vA7k4Mj0ySbKNeGuiIx1pREgluiA

0LTKKFOvXeHehhN3anFfEJzAMRZtxnHZtYHLAECF4+ffszP3940A12V+ergQSvjUa50vJFa4uapac2CD

0y7iMECUJz0tv4JlJY9KnToYFQA2rYxKNZoDWT3hjB
URFeM5E3CaE73c+dAXb8dqjv+0ZAFSHYuuO6gFfcPI1BQJbTYwXBxycDa94NifFo9jgCnspQKwLyNx7w

RnxcCf+9ajxbPQPIRiJqvV4FaJYTQSAgGCq2PhoqyHzD0wHrVSzVlBOJAkmGcx+6zVWq3EScKtb9HHVN

pkBdqat4DSbWuM2nDrfsG7m9S3ZSlEebYlwQ2IgYeJ
wenkD51CqgcQkucukfrE/7AIis5ymgmBcbNquZGD7AEFXlcGR0nIJbC+ii1v5T1zjPL0VEqQqy8wUfIp

V3/oNLETJcycr4NJjsHhYVe4MJfArEZ80zC2Apa0f5iTfRR93HgLr4PR9Bxs5/Tt2LKd/Pz24bT2N3im

K3Lwc/cdr4rfiVYTE0kgbmwC4Cma72NpuTCVeymACK
Ubi/dLP2uk0tU8QKwCWlMEqBRIaR1lwGX8imWxRGLo0BcR8bvnvscUh09G3dvfEnQQe2ioFjFw0nbVZe

cKwBLt/z9Hh7PM8P1mLAPEf6p0EplBPxN4vQKQpG7pjGDelefsFjzw0ItTXoqi5n2EqajqhH8G8pQpW9

TRAWngO3fF/ImomWQE2ARwdbicobgG+o0XMeshuRCn
lLt7yo6M43Qly+lfnoaKq+fdtc2T9+ZBbm/LNBsDl+h7vSTJkZamH3VKsm9L+vMbDhbjCEKyOTy0k1SG

8WuRhnLI1kKn4+Tm0QoTpyQSkF7HKGyF2Zulei38mfHeH/+93tSE2rK+s9Eq9VmH3IhMssOilVP+U7EB

jF0Et3WlabQLnhpg1LHQZryv30dzOxlBMc2y/gYHPs
yIia2jQdEc9RYHb6pSS9nGxFKOsxOInS9nrShS1mr5nkvH0GL9mG6SfGRVPous3XR7oeYwiLtlprv/jr

ay0LmAhtywSCVJVCco/PK4SE9fDXVQYQ35Etv86cCSYZPTrqnY9bM9s3Yi5y2BsjMiKsp8H8iCk0N1zE

++NJXHi2eywWgOr3TDQWVxekSJz09RJg8XTgzMwANH
bZ2x4EnKmW475vhxJhM+WQOPP1X2gKiENGOneHUyGk2yvY7rk+hqrONm9wu8t2vTOlqzOM+aK209onTP

AO9rRs5mvtuZk603jHazJ4makvsHI5yN74ZtS/KHFWSBZXyd9l9zAYBGvKKKdEh03HLrW5m21T67oCQW

QzruqFqqKJd5UGNUB8YgDhGmXKenybDmPxGcY62o1S
JnFluLrMdhOZdRtFaJ8Z7LxzekTWWNh5WN0K4C0glVuz9LJv3sLy/tiwa7CEVvjWBMrMa+2hZv1V6jK9

IcE5L5E4k1VS93YZp+5r86Idgw5LuOmkenW43vYiTy5oHk4nljI0fVPwlnu0r2B57JPXa7m0eBBOx5La

hBLJh7IB3NLq7Sh7C6+WOl4/V3jDWRx2m0itBxk901
TUWSVyioKTQOs0BpLcbip6remgT4Nd5uk8b0wP+TGOytqo/yDpXFU5FG8epV+au794+ZVHxNc5Dmjjq5

SYHziqEvOqDWPeFd1zx4701mpHylcUM4qNFjM+VgkwvaIeH+iJQi+7HNlY5F/zIhJ6/MNM8CMN8EMiTV

dirx4Ct+7PkMiLbKFvul69PCFcX0KwStLGkGB5Vu+K
ZojRU677SU3EW/nX33Dp2JTbcTyR4JIKTQEdrfZrdt7W6IhYZBc0LFxCl16AfX41cJlHU3jJIbH3ZTCm

xtVbq06jojb6fwqTkI8Y5kDDuBEZlMmBY7NB5/fYzddmzwLpe2JEKqhvoaWd5SobI1onXQ1RL2PB/UFP

DgulUsztmusQhKmtbloFAbAgQxFIJdDnUk8RWctz/y
qz4iY+sXRdo0Mrr125+jDrn1YLMa69DKl3yCbMbNihMMkEdmfjrjkGJv3pXqeFeC2bkESaGRNb9Jrfjo

S5DHxjiq0bM3byrMawUY5lQjBmuB2sTkNmaweXdH4a9xHquqBdgIeUnYoatvj6zobiLbvjuBtdomIceN

7gckVRSsQKMUiazVaPe4rB83Trfo1gm6DB8nQC59fp
+b7MN9ccnhKM2qtKk6CopnjRXzdQNAfx7bbmcA/JjG4UKBeSc3TcABDBduiByde3Cc9RufLpYZE1wEwO

J2LzlL18iA8XHaOt/3ogQi447wD8wZ4+SheG4vsFCHPIKJg1I4L3XBBrId3AM0KF6qZwnJ9hcZD01bkZ

g76QO6Fx48S1rvryOMMfszGZG4IWZcVD1s4Yv2FHfO
zvFM+/TYUzX2m62q/SsB1p2NMb8yIZ8YniOd/NQW5mA+B1p2Th6XPv+dFr3KQkazy+kfuymU1LqFreWP

3i1cZYIW9ZI9P2EH2Ko6KdZz2k7DC7c8s/8etBYpTTl7/HnUyaUxV/tzIuNP+FQ96mCiQl0STfU+Psda

Nw7pHAd0SNs1gZHoeFi3imN7t54uviTXDEWRhvllld
Oh1vjHokLWqbPN4a6A+3reckZrcza8rjoDRScxNTXtLjvoFAwr2mQmSyj+sNCd24j1QBw41mH87kObqq

6kyOQs2kwEZuFl+mrpNsFCcGt0OkCfpQJCnI7KQHi4u16saoK/JNp7M5qrCXZM/Ni/Gw+sAIDpiOt2Fx

zXU/JkZ9W4KbohpxMDVkc52Oa5NjHPFPFxAgqzxpgZ
ercm8hL4umGTfOe1o5WgsdopgHM/jDT2B6bo1LNgRtPKQyMB9PLhU+9mBMfCGuwAsFQB/4uSXbD8UncH

7rSF1rIqfRHX9/ltymlfDwXOoYDp8LNukPEGEDfKbvkjo3Qr0AcYbTxrsw/aHB9yAn02II3jodOi4fPk

/QQHKHvh0Dp1Qey/sJZF3opM5twhSWS37soOaM5o0T
S4rFLxS/DhFQxK0CKKBBynm94a35aNIDpWq0qLdy6pkBBdFcOlwZQ11PmznCN50n4sjb/maJItV5aRnd

+Jy2V6mCzwegUm3amkp7g9IPF+t31ZizE1iXpsx8nHk5bnfebAB/5t2jw9PbKZZtPnNGAEZcbB73uChR

i816YRww8tzdizaoo9T/cMk48EDwnIxYB9a2x1F4W+
+a71KVM4H1N/4OKLw/r4388j0GBadpj1BPSRpy5NK+wz7SqmF+ZPq8pjm75LSqaHhO4MFew4HITRAXE/

3e0nqKoEX7dQXmvwvarpGSRHdfn9lS96bfJMCMf69nYfhPXxgnrBNNS0mi4s1L9XleSRpBlLl/gZCKp9

4oOmGWm72sa4Lasq0L01sVwoFYkzaGWIqgtxytjvpl
IuvlcKiU/gQn6OOhbU6NaX2g+OmOZ7xsA2OtIhYUeLVeOD1FC2R4UcAYsD5D270jkHZU/RWBTNqvYefQ

m6ed5a4Y7lydszQDvCi67hm0ffANN8fILSMPYMYOjD83V7pbYpCxNFw3q/bUrFGQwJVxSqRMelbAkpM8

6ifhtbBs7hPjq5E2jJPfYRSBgXf9BMTb/4w+5r2BZs
uweGrHIKU2GbjswMCCA63a0pIHiYgf6SFSc2Q7UN/c4m4cc+8SVoXHe0oA7EknHzP9+j4WB9MjRT7cp0

VJ/K+zt87oA3DdU3nWGwlVROcELjZ/5Vr0jMVOLy0NxV9ctObc7R5h2odRMZjYe5uCZXqeEVSmyLg5H7

4p0PifhDeQFV5yrg8nkv/C8LqeTuYU3WYY0se10JEM
Y5R39DFXqcbymhzVH/R9/HZ+gnaFqO89zV1RIcERjAP6rUgllNrL87/0QXCXNrdDl0kkKwVhsV2HzIHG

xEsjT8KNFplLfIgy/8v2YtaNyo2ON7UC8AD1tp34NgpXrv9W9xGW3yt0hTY2VPv4BqIoFjW0oPgR/aGh

US3EOVpQWNpJUyO1uEUZUvVJYB/IPMmJ3bIEg6ZWGV
9HIvJqWioF0Q5w2FSx9TLR6eoWT/LI5/cie1xEYDdcbsEHXDzkNhxQNTrXjBIkpPWV6YUiV6c4iiztAX

KNhbjX4baQeoHPWw15LVFVUvSVXLGj4RshfAEcWjWNf192+GYJLjGJ3BZ3KYj5qmzCrgspM9AbH/dGRD

FsYECsAy5NS2QPgjJhdKX0XKQStFrrZ8VJhihXNhjr
J4Apjxv6vVmsAKX0PzaA7yFHQWPffaKh3X1SxRHxui5DmGDOJVV5IXO1j+Slag2QmF+ldHf1g5PWqxyx

qyGREQtoKwPJhrZXfKIH7w5tcjvFDaDwA1LH43XDYBx5N8Yextj8NouUCyau4Ob9oI21BsfrYuE5YNqw

ZnpDW6mcEFTO6gScX0+qVkEHWiUqT7hYEojyNGdZ2P
YfHfL/SsCwuctEAHsEzd+3d5BkMpW49ti91x5lde+peFAGdfRDUi202HLNmbxDsLP2Dx559wBw6/Zgbi

BT//kXYbY9wJNKTc9QfTGekuFa4UySas4bR7Hu4xGo52GDPJZFtWaEl4Dme7BwdGlzhrXpbo+ZzXIiBm

alTjar25+RqcEwz2Ris158JkTX9K47jYtIXX3aOA2f
SmmCpo7IUVDtpG4jfB7hFaJFzNkI9jDFS7u+0nwtrmOrYcAQ8MwcJZo3DAIL45jAG7twp5oLfRLGvqvu

3Q9+QZpOAlQTHmXoSbwWLDAz+eDQSQVJOM220YAg+kvC6aPFwAEf+9GsSeOD7ad7GOl9KV37JMktJcxW

Zg+FM580qhlBRwF5UmZOFbHmF6hgf+fj5aOASmZJOE
nIHQASu8Bl4KlsjXr8fKV4Jgl9tbIfLEALilfx5SIPxF1ifrL/TvJxMmSGSgzlmkXeMghDunTUr8OUYt

Z7Jyx/DPXgHNaN0hRY4Q9hquiW3p30HeCxycr2r0vOQtJEtSB6+08S/lFp3wmiEEY4hJ+OVs1TYA1+HV

hkPW6VwhHH1PEic/OGq0cv2mZioPMx9iPW3DVQYIHg
u/F77GOK3s5azTBEaHm3/j1cuTEElg4lykndwqZmu0rnKYrKIIU3lwEHlyuQ8q2fSUHePqd6U17plyPS

BtgcbOPi4rYbrUO6Xv757saG5o2lT/cPQBMWGGXU0oirtvHLDNbcmu3m8tW6YoBrGi2Z9FCmQTUDnLXO

12zzZk68CExdvP7ZEXvFTkD311InaXd4wn7GiBqlau
JgysH9RzBNDgyZJvvLNk3lnXkchrZf+CFQLav/CjN4FO4/1a0dVrn9n0QSuVezjOF8ABEtdr4an85kjs

PRpnnaz0TYCOPaGMvBOmgOj9Puo2yjskJiqJqXGSXCEboxwDe/WpBDcCykuBeCYibfWZDvN96KYVD35X

LWlYC7QcoIZDsKlAiWFuf79ZPe6g5f1QgxL+bmIg4B
iqzFgZO7wziEPheB2WEvCfBhF23h2h6xpQbXno4ZWex0iqcyL+/2g9kuOkQ4a4qMLBIWLF5AogW5dVZk

8RbnjkYsTOzM1tsyT2qTJpQOJAb2XkJ+M7hjVvoggUXYMtGB3eqa8+V81REyJ5yb8pJh5ptfxBeH54DT

f4PFG07nPddByux9Wll05Ry0IprOxDgMLV6HLKW2Xu
q6Rt1Tz+ydllWcbV97iCdsObg4ZhbuwCnffa9t0h42LHoosCbwnIS8W4OgH3ouJaiMP53z6LonrhBx82

G5gkFpoHvhi5znHH0ajcc+nu95A/j2iP50rnpjvxlt7QMZL2v6qmIVvZPOSq8vu9GzuOYhAAHyT8l+pT

i9dVwYUL3yikphoHcc4+bAsGwN+IMqrZQyfEh77HjL
FRvxe5Db2sYe4HQEml3fCMqjPHv3NknsNa4z/ZMa5lL1HNDiiIDroK770+7Wce0Np6s7gzlHnwVAUtFZ

gES/vm0f0HPYAG5Xcmp3iuHZ1TDfgyFTUBl2OcUl+aNMT6A0bcIxi9DpVogZkkesJlA62TB0KHPZMF63

3igbjlXp5Gs1xYtDAtbtkJD95lcb35DRm3bO09ubmt
BPQSYpB/QK4IU42PfcEs1CoLRLg59f/v8vIi8x8GLpFAl76HNIcIRs4KbYNl202LJ/S1+pZkTDGY53Y0

ZMV4eenIrogWryEao77pRjUadIWayCzw3xNXhA07hj4Jd4Fze7xYJN+13wfWBdWKF6Ee67hiiG/g7xrf

YmD5UP8Op8hHFtYcCE2gTBWRCYROLzBzEM5RsFCczc
Dm3Jitnv+/QX+rQrgTJY3cKbSkuwgmwRprM+0uXU3dveQXLi14vCW759X4EAXfhkvWDDcPT19RZGjsmb

pss6Gxdi5mhxujjTcAnuN0TNEIJPeY6MHI+PBX34NgdLdTR/CKSq/lAIkSmlTsf0Errent60SmQ4iaSJ

dOL1iXyfmkGnWQdAsyPZsYC7b+IiZrgKz6zBPXwXNa
8VgYoWilSmjTe09BoWZvmsoUAnE25q+iiTkJncA2dSgAC2HlfwM9jz3O1dh/nHRBiOLSUFY8oc7zco2r

rZGl6Zyeq3xYsX21oVy/369bXkJSm2DGvDRn1PJkUKq18JARsvRXt5r5HPbtgValWhVwshfqQ8UdlRYj

1NN6w6aEg7v4zkmi6FtWK67/TY5fGCgbIReywWD+TW
4moJObRYhB369pepECL27nOoIVS0R214O5zHYgFlpBcfiw+7XFEE/LM9oyCE+WKsl4SVVwVVhkipOyyg

th36HcyRLBg1uGTYAw5Trkzze+MxX1RqoOrsGYoqlywSVEfrG8c2rkmn0IerxZEp8K08UFh9UK2hVoUD

hUjU6K/alHzpwRe1/96DP3NKVk8WUq7hNEFWCPKCXG
wOdED2iOvhKJR6osmgbAoDdlTJ0k1igcGF1HbEKYRVkWyvqnuZLRYhiHX11wvZXt0E4jLv9JXWCnH32A

PklVgs/4umt0N8AXqDIWIkYHPeAxCz9YCvn0DS5DWXPYO1X2MrPwQX32+Z657w6fHdesAphcAuCf6mzR

LtZEWRW37Wnzd+tkYTHdBadVFIrhphbAwDnI0ic4yp
Z3Sw4Bw5d5ZsHYYzdPW6sxgNOmzzo85Ne3XyJCxr7KPaM+hWc1rv8tkabz+Goqg+COnXw9HpN+9eYrF3

fh6TFrZxcg3Y5nU5aYO8XKaUsDH/cQqhF3l+St8aikBId7GN1jNOGP03Q96PWSgg7tYIj7mer5pkboLb

kfptqeADM6QYvRUjIS283tvJGHTazI2YjZGVmNy6tM
hbgPkEuiDKIAuON2jdqexpia3SSliZ19SCQvLwGU6CJS1O3ObH0wu1U7Z2eVsK5xdEQ51g6hmardZFN1

XVt1XZ8IOFoUUG9AJ47s2FnYYmqpqRC8R72vQdXiZuCaAVIY2vAg6O0mdBmpsnzmhlzCKBpsTGuxszRN

0RHCNJXmaKshFmtUCMk+C3AJJfe64Dl9GdppxOi0Xz
ivQazD2bs06YNkMXiKmkFRGE7LB53ueaR1rmjiB55wbhA29m0rMaulNxRVgMPzhxz4XwoguG+ZtJC1wO

Z24GGKsynp+8yz1m9dIu/fneEtC5LEBuZCbb0UE1P3HMmqo4C5cnBCCyoNfwDjqxsQfsB6UHvp0ufCou

dh7ShQ27SAW0j2vA4ns4dnBg5hmXfGUUxsExiR0o1e
tPtdLjBI7phydiKKLTN8Ze6st2VBRupso6DqgWiF3Iqj9a2mKpeVqeSb1EJKaTo5X8WgCDqNuioiK3WD

su1VGgIEFexP4hs4w0CZaiyQxbO84TZKCj75Yjpngd571n+3b6yDvCbWl1v9acLys+orwnLcmsJuAx8m

FiPYEGciRfjYLNDVfVL5LyCfsaiCdOD1xtgV1FZ9Nt
0vEhLwHvwtEf1H3DLcPZ3qfWr8CwMEHePTms+hJKvBSfye9cmb6PxYThZn56Xo83AqvW6XI6sGlA/GBy

1HohVYoKUkOGYIN7gMkBNz0tUaj4yLEgeRXmURn5h30Z9HdwGqcDf92MsJojd6FF8+nq4wwfI335k5cV

Aran9Hp+fDcgCN53R2Hig2EJNEmzGq66J1XCGR1GBr
gI2divZq8dfAr4PgnyGV0ES15XV5YAWHoHCW1SEj341+ifv2NZJbEVLizOSspoDwSptLlpCLboEafVWz

M31qFdoWYplRAwY797uAxxuAvaN8cCOtGECMILjflGcLNT+hfyt9UNnXOEFo3i7+FZaJkYmBpunakIk7

tAVnYntEl0WZpL4j10J2ZF9RiGF4fYiD68V2X0AQEb
gM1ukRYOmB7gTm4gjXfuuton3vOqrPqOsDN8KmjVXnsdfbVhhisJ+Gvgn5Y59QBmwtf2b2Kq8jS3V7rG

jzuWj4JlH0MLDtr2eyhCsFU5MuOVxVM/JBAEAEmph6kSQC+wfLE9mrcy4R6Sdl3kfiUB2CinJu50FeIK

GaFggju5FpHDsW5ndP6BMZ/lZZF1wnh2sAbg2aIQlb
j+WPG8CXycmcEnPVlLUuBDyDki2913+6rh6yNAbiR3P31yE9+Di44j1HgCiiYcHOVh+u2Ttqi+6MNNUK

E/G3W8QOI7FlLK/Xo+0Y9YpvUaMijVMAqzLmvU5mRQH1+AUGYYQZzLhpBi12BysRk7iM+OBuWEYr9zMo

q2q02E5wBxLZ381xVEfOtREWMjvT2GENmlw2Yq4e/2
oY6HmQ8B2c5kWDAjAZ9VFEXc72lXDQuh1EHEWFaAd1c+JNvMW3Z2kHcx7DFEzR1F8MvzUEbcHPImoOw1

tXiM/FPVq8YW1yw9f57GC0k5qGTzMeTX+s/oLWLQYQIImFxHTS9LXwajq4GbiH2PINcDjHvW1jwzd/av

LglIHrghnd+YhhxHlK63ikQRhcHnWjD7BbL+csULui
ad9lutBq4KhX0EHYikJAsq4tZJVLgqdzEzoEDPmL8zIA+Qi1SE4LLQrGpd4uPZ0u7t1SYOOhjWjA6dqd

c5fkeDNl++p+UXi5oTo/nr9URE0QRiarwaKQ/06f8y2MG8l1NGEJvddMfA/XrLA0NSGYFx8ULU1/gusx

CjVFxf0iLPi3rh5HGBpwwuBA0H+EksB55t5UUdH1+K
grGaSxPBXIYpRvb1A4wOdffZ8+1zL0PuFVPcPB38wxlUKLOm1c3R58IU1qK+wBV+0QRrXcgzmlm1Zib3

MunFUh8SknnUI6i16oHnYtWdQ2y4VZmvaJDlHS9uyOvY6gehQzF9kE3AAnFk+ozPqxOHPWvH6IRppP5y

fj3jQ4vg3vx8005N1FFYJ5imHZO/b9O6aXJMxqHbsS
PQACIMDT7yci/RGmEfRG2DNfIhiw/GGr99/p89TCNBUKLiuueJrGFy/QVFAUTATgpA6yr54P2wajeYFN

IVcabhAoood0vtAl/ndJuRzz8SlPEnIQ6Uu4jduMsEIWCsHxOnLkOKf01jL9NcsP1fVBOdlOfATuIDEP

rjHK06qZPcq0WBWf+0Q6mYqWBa0lZt0NPF0y5368Jr
nwxkarzACwET4Lwsxml98Ejju3VB7qJ0W5+5s5P/TI5RUyKqeeNMSkBRbBysLIjo3+nI4sQA7uMKgsxG

tj46rYrjDr+jRnMLyJBfoN/Wat7m086m0ps9PgKpc8eDRHHmAq34NZn3AdUfGKb/8V9flXBUgHtLKQga

qWFyniRkLR3rzyFpBLVio/hwWs9jLzPSdbKhnGgA22
hOUHbKmR5GNoOzXB6sAMbPVvSZVMW5M3zh1aHI8Cpus2kgDMgaqBBhfugRQU7TVPjVg0kjd9/dT3dV4B

nUPgQZvgw6LtmpX9nitx9Rp9c9LbquRQgzgcJscWw9L1BCPFCP7xKVf9DWgNMm2ZbwsJbRWSC5c8sBc7

ka3GHa9a0UveBy+gmbAdPfHftWFvgQlcCTT3M18R5y
8I7Ah5kIajp8+6kmGcjbndWxS0DdljRMsxApTKzenW/ApWyMU+qPHgD467cptaIj5zaZsQQSrRkt2wWI

STUz+/D4tKcy8rMd/ZuZWnRF6xbFIno0BVrkcEM0m6Yuxxi+b1tVbK7RTUD84JuVTVweU73uN1+rOk0J

tjq4pReOandl1c2l661GDgScylauk8oISa+9jjV5Ds
X6VWUvIWDunMfpFN2EumnukeyBaf2gAFimnw2zQeKKzChMXIMh8EskP4srzZ7oQ/i98XKlzD8NgkX7Ek

n+tdQfH1ZK/+D/0ENmJybkAei3Q90heE+7/H4gzHwl+kzgF9zIh//rWyKM2fSOxB/3UHds6CBhMDJGMS

6KCWGNBbOWJBDH0QdZ9BNXO+QbSCbZJzz8lwR08Sge
Vwsu6x742a/ISkAfic/Wq903ZdZqL3WozDG8vWeSxwyrYhf+554ewo8iQ8i2W8eXZxa362DTowT6i9z5

5bkwC35ZtMBl4WrEw2iYtflY9ca/aRHYAZB17/jXon15aQskbFS49pDyVYYSl05OkaukEdQS/Ht6KQF3

aJfDddkMuNb34Ts8axdt6kPHT0uOXmVxCZW+iYyTgW
8ufTPD2/tQXQP5dMejXOdsUwmIZGoViC6+ZNSsjIl3XdzE+5g5iiyIpuMUfrHtBURawzeKsfMoYYn+tC

xeZDX3XNK581iP1bQH5E7fhWPI5qJwQXnoRstMR1dQ7m3cwgsO+Uj8Er/OPe/WWuRV50QmvR0vnTkL7j

8Uy9+EAPzXG0Iq3c5mbZtUQlWORMJ8MPG/0bG+DWu7
A+WEMsmZHnHmVTU1n0M+j+XKe5sfYhvvWZTfmOWWNW4o3N8oPWQisFs+E3uf+zx0g6oR9Z8uZtXNAZ2a

Y706TRnqfaxkSmc586PxTuhI8vDxZsIiTVmwqOzWplA9VZZCsgrjYHuL66QfyUQvAQEXHARtNlUCTexj

LYou9pFuh6vX9EQ+wXroVatcmuDEujwY7VNqd/Fu6G
on32OOe7QjED+j3zoCw3XrjrZHHsBlr8KNTearsIA5GBaTmbz//dhLYMPH7idCqKASqWf6JXrAKMWT6K

DPVSRx34zL94B+k85uG16KjmcamnB5DkSVGx3RTgLJ0AbOZl2qG30B/LBrYkIKijhYv9hk/hCVnwqO7q

sm8jLelfB9BDYDLe7eij9UBPoG+YcgTHb0gH2J3DvU
kJOm/Ir5uEi5LmqYCF15ZYU4MbJkvfm4ChDjcqFKWkN+xuAg4O6C477w/zBznBX/VHlAoMLRCFktTcGX

NBoYNDbp/XBpKDY7dd1a9mD9ddYEDk68icKpixo52sdLKpV3CDCzLM5uu2O4YEHPc7yz02bRW5NRp/2u

y8n25PY9pJfZqxZfpbGT58EgxWTGPo/vvXDMyEyvrz
IxyobHa+3/uhzlRfRYkab3aTRgztFJ4xSeAE++SxefZVG9a89MywGhR70xAjc9zMOxJ270aBwIrJpVfR

yqUX9ot8ePxBw5IUJIbBPWtsDtz3gdUzEgyI/LEvYhn25qtN/UQfa1bl/ZLqrsZDUtRv24TVjIudsVtO

plv9nYbt+gTFXvfIqsczpJ68uv34wTsUdwOxicbzMl
ptIf/Vf/tIkAlWhEcGvDEC7KZ1kPG0YTGQnQpfHk3q5O2KztosxM+ZgMnHj+bcosuUk6JBZpX1bLzazx

FmhNn0RaRJhDpJi1nFJJ0iHE9ofT0f4pobji32EisHAZOT2rb7b95ejGbXxq7ToF57Mk+kAprvX10Yi5

3VyquMKLRqqeNavEElTdl8QaL1hofpwY6m+D/j1plC
V2g0LScPNVV/nNayX++cUAtlFbdFEHjaulbta6/ky6nY168/uEHl/GyBcMUsKBxTWqRdkHU+szjPvDef

R7Bqum6nWcmtL3j4PWO/NNvOA/VY8/02PuOverGiuzHe7+6yA0UITi4GtAeL9eJaS0WdK5vSnm8244I2

cmCyyi6rbhAbkcATRd5u5qw+FZY8SExAdvejITGbhv
B+Pq75a+h1cXlYCVriHLyhuNRsleeZg9vmbZwsSv03ykmHikz8PEkwoN3iNYvlFRDLfP14Is3rfoC/hv

pXLFuYpXc3O9x9Xh/ItB+20WsnrURsRGV/HsdaMZ8/DdhmM1rhueRhY56oF07k5l/Mw/gOsw0cvyzgt6

VMx9IFIYBhlguqxgg0AZGwPJIDJtyX8XY2wNHT13hX
cY78JlqVTorUUkrcH7pmUYjIoCQP4+LOPSSaT5CD+aJS6Ot8isEHj6vJSEY7lKGPT+QTcvWSeB6Y91/o

a8zZkFxvfZRW1mLjnAPG1DiFa8O7YXjvOiD3DU8rb1TUYQrZy4SwFc4x5BOV+KFs4EXWmntcg4634gKd

ohejHUXchZn3fRmE8v0jK9lJXfxaVJNheyeVdFSLkz
G4uHdDPGHJ3N61VvZ0uA86ReRyaTr7I6B8MKyrTz8NskjOnweOxqji4Ys4gze+VpN3tnnnBFNBF94FVJ

L+3QLrprfdHZMIcRidob8YmqnboZ+1M6Y1rNk5DHmbRthJxTW/0HjnQ10yIbJ5LhFtkYhRdm1kyhmA7I

/V+PB2KmNF+zsQOY9cCnuAYaCpO0+d8FMA7ixReGK2
9aK+TV5PZUkNyR9enN5As2hRBkjD9oPmspsMRc0BOwFf3Wx2Q0MA6eD60HC7LbSmxEOHRtU8eM1eAP39

qQfeO+JcKinTg/Zw9BGVSmPRUBkIrV6ASPrfluqk92mRoS/10dgj+azHDW0YFgS5ZkJWMakck+OvIDCt

/lipSLD2+DHUQ9cCPkmWb8skohJusv6q0m/8p9TV22
tSsyjwbXbM234e0rr4NR0cKpSK9tj3yZVtutiAhUKHvqw/9VugOMQZqxdcG4k8n1iJJ5R9ROC90a+OCN

q7SHurr00EbLl3nHh0rOCdmAjCWPDu+PMO5UhpYk8bvGQZO0P5d3jrd+3oRIqKZT14rnQSVvQg1rfoh0

laa0rS3LQen1w4s05LcxZFkZsksj+XLx1EV9+sPvIP
8sB+h0I6vQyE1Gr0UPp++Gu+xC7XPm4aXIaOjnObVpU/obqFguQsEPwPtPt/PvGndbGgtd4WnqTiuJ5V

CY0X+SNuTwlTqLSmRG6MjZVej7YWvs54Slf7+TlqeOuny7T2Uhv4hblkV74lJj5f7aTT0/5uvNKbTT+P

q2hkRHSRzk+i0tM1BAep15/XwbdqGhNOVu/np73DGo
u4bhDNRTSWeAk2NcD+0ysPao2VtVJ636jsPTIBBgPETj345IXQuXfzLp3x1D90dd4+egsztkcyzg5wTq

rmhtYMJvJUX8gPtgwqAfuWmms4MOsC/YSjDGmiUrSnP/4P3wQJeCGWgvpdVMOP7/4dy9ZXxa1Sih4sdz

WLkkI1VK0wG4ddtI/hknacQ6yYGqE5+dKfL08j9Weu
ahvUTg6exSquyKHr4vDdp9MPwqvhoz/dyyrGocp6QsjyUx1++mlD+KFWWcwVZWrNR1m4F9S4GvCS6TSy

78/nKLDtmKnPKPXoIxXioV7jMqD99+q/6WrZFnFEtTtPzaiv3snTNtUIODlaeVHfnTJgkBUzlEeCN0Pl

5VJIrByP65oyB63UQOzqCb0rmGQDCb0+yhA8LM35yJ
5PG6FGwJaJiS4o5XJH9FoReK45HZpj86Rf9HCOE+5vEswAL0XLtR7ypo4gLMS5jbttW2iW1efpFou1cT

jekGL8hRKZTTnlWEfSs7mrErnj0RZ9+bPc65RJMF1repgWaDx2iTlW5khMMJbn1XSYadQLF8q8XSK1q1

J3DV7V720zQ7/Z8bssvPDsLqqZ3GJI1IR9hVriwYn9
Nh1fidkMR5Mt8U/csMEykwtMpo/pGXOg0Up68Vi2/W7nO/+F055+DGnobOe7bVoMegqjqDLj8GpXzwSN

cKvQ/Kh2+4gI+5wcAILePjqDb7VU+muML0MU7C0fWhel1o5C6egsuJq2k8QqQm5EN0skacTzY4KDrXcU

RZ2/wZzPl4CBKkX9hgn0pOS96uKzYDjE1VaEU0gzhw
+WcpS4/838umnmliPz/NrP14gLgMkVry6kfSaC0/cDh9kB9dgq3UOsEE1ATBnpTsfQF+gmVu9lGHItHz

UEn44XFD/aWuLo+9DmMcZX20J/cXK583xVX+7Rh6Ydb+/dbuYRkdADsU/j6SEtWKU/1ZrETZktwBty5Q

zo+D1gvmcDYjfq0jh3ocqta8Re3hlUZOQbWB50w1NH
MLpBsXBoVdiKYCj/JvdbvkAR/MagZSZLsvmxXOPqbnY3PtdmR0/Kxb4hK+vnfHLeDqiA/wt6A8fhkx4j

QkInz2/qFr0+Sz6YKG109m63Blh/fNnsk2N2UT6ylRs14Z3djbPTT+h7UahdLT9RtdNlBYJwagbIyB3i

79nob2hJhhCft1uYL7DhotW8kwxQGN64LeJ7Mg4alb
BUzncZfY80WTLczWSApOvs0YdkYqM6LRtv9SyY5QmMhCWaWi6S8K6T8sBIdKY0a0J5k7vXgB2eek2ypb

Gqhuc1pyz6DXF1+1FMoYyxe6tBwQmI56+paN5UHLWGxJYjj8qRXnlH6f6Ht4x71zdHufDSX8V0t2lc8v

g2owEpD7O2d72M0hG+/UvHSI5XdCqzRzo+KIcjSCfp
MOfFAuczfAAWW1fANfj49Isa58c4xuu3Wn8cpD3Yw0qbM0V/BEYYqGWbBX+pNfSrtqyQrT2wKF/4DdO2

lmkV3+gyryU9Grq0xcindRmTiXYXMMn27jDdMeT2dmpit6oaB3qu29NaQf0c+kn/vzFTrr2xiSahH8tl

KQQk0zbUczedonf/xAqcW9ZjNcaHi3TyfpJODPBWm7
5V84ju1tf9jdn7SZkk2GlJCF9KRxJ9T1QaJyAh/DDqbMK0BRfSi4JN5N1jNh0nsi5eGNVcPHcV28RRJ9

NlTb5NfMB++nTqkNjGlSE1hruFjuUO8kc3Or1NBc5Azg6JsXa23cqMDbJnXTzzuN+/F9JVK0D64/kMDn

JwQ9lHqGzoIlArACZ0RM8OkLD+ynlHSS8Jbnvad6D4
UGg03qx3vnftOd4YqYjr9rZs/X4FciD719tluu1iKtEE7BX7TJo6u9zAIBm/B7JTUH3vUUcBN6WuLpP/

EhMA6q0El4MWMqarhoTMY6Tnj7gf6Lfkq93Qqw9xsLgCO7Mqfr8PIRpUYF3/l2VYIJQs79lXKxsffwnH

Xl8yhR6QRk9nCXiu6osxcTVKRb0w+AJp1y1auCrdqW
MUQNPg1FijIQb4VDR80bUgSDXDiBSnpxTQ9vgtLjR44xH5Fp3tD2pinZuXlE/3BhyGIP8fW9RBdIBk7T

LT56hKVsQSNIWdBAmVo8XFWucUmjJTfVeZyoT8DWCxn3OPwfxEl96x3g5u0sQBdlr0Ru7oAfjsBvn63m

U22iShX0345JiGMcBZFfAvUyA1quEXsAjfs07AnALJ
TXZBSoEQC9s50bNDnE3BI79+6I4FlBpjiqqjgQqSpvyNaqu//makP+wGYrlD7Mtshi7cQ4Tw4ZKh2e/X

3k6pZ+d1x1f59zIfrWKgPX8GL5xcX4jYS0SR81tmJV5iEzWMpPlJdbfFLa0rTmy4Aj3x5znHXT+f+Naye

OHks+a93Bxq6aRmJ0//9Kye+/0YRyFGORcFB7l2Jwv
alNSqr0E2VVEs/NKcMDKqA2bKnEkWU2rOs3i/PcopUZWMXpCGmT1/Jd781bHBneS1Bzgaspr8lD8bdlj

VXsKt2x1A4nILfMywx+zw6AYpML9OiWO5D0eE3gzpF54fJ8W3IlWcfr0k+PBfxHWdY3TNqlz0Yz70wNA

6G/A1yPkpwn9hIwtB50QYXVcsu/xny7sLmPc6gh5hz
fpRa5bkKyHWGVtP+meDyscsANT4GHqpOTA1jeYkOk9tJPK/ZVn/3z6OMzD0rcd8d+KSXNztUu8zKkyIq

WyPLFu3DafLELWU5o+rJCRsqh1Hgp0KpN5k99QnmtCakMKvZkz/0m1cXlggyQiZ8dMh259/jrWBBZGT7

oBkQWzRCzd4uvKrd5MSlHHPrET2KdTBN0TB9/oyD6f
oYlc7kThZ+/FMrhvtgv29ZrwPb3jff51owun/7139tmBOpIn/hRPOtcJMd0dQW0PBx0Z+llg/2A3q1gf

jFvKmr4hWrOpzuSYV4oyAly7UtH4GjZ+uREo5U53dMnrS46nd/ghybnLT3M327qwNbsbqdyCuQVQLtxE

RqIvZZ9shcfCcOtAsTnuTZNXypjz3rVlrLnj3iRazZ
fmrHNGJ8Tkz+vVGQPzyjTgnLcG+3wIbHLuJK7oeu0rHSH95ZgTqkctYAlcc4uTQtcBOxyKqC/+1UB1SZ

wF57pJWF5RN8jgGx0xIAoc+VUr9iaeov+z5mI7YK70AfcH5q/SfvLMebZTCIpVMVhJgipr/XrxUYlJe3

qLAaMgk36PX792BZPSG/y8pJOja2WazJUKBva8Vj82
GvK9z17xtKtZVuV4BUQTYlU4CKs2LOpELZ/ueaziRtVcay/Bx9uDh8A5QpR00u+b4SztEtLqiiuU1iuV

RGcQHODrcHfhrJkTL3cbaz6B3gvNvB0icOZ6xrBv9Wbdbo1LVvIR+XjNhJU9HzwkmbiNWNrGVu4XSQKh

gsx3oGCOAPLxAkwH8RFJ/CKP9jO80yNPzJ2bUozQCn
lfnqzDuSdeQLcjnIxNya0JPBVQZN1P38rEz0SXiOVYlEsjiAnnqS2RpyNtlcmkb9apTauo9wPHTyG+Je

cTKh6/AwRZ+vVn2asnHg9oMcRFEu5U9XK3eDsHDZeJ1/HibRER8JLxkKjbT1GFo78QO1mv13mX6H2j/5

ilAn93oDjyIfMHbNSfR5QzLoG14tluGhbI8jw2oSef
AnliT59QqYyYFF2yvgt2ZSN0p0eSuBpbOYPA62Q3qQyvhtgQKX0/INoQe7DEw8gePSePUO7SP+3PowKD

tCdqalOWNGe0+l7uhAk5OfetVXjzr4YHm+5XgDl2eS6F6V5FKruAUopPAi61v6lXrTdLGninRd/KP8t7

pQNw2+EXZmtHV1fssKRpb80Mx2HZ9DCSvqJtnmp2B7
nxpTjs1evu1u18l+dQkzVfwOjGxQMcwTj/eP8WBEDZ3OF1TZAKOOGqN4Ji80Zer6LSfEGc6wGPtyCfGC

RSBCNtwV3Desjv1T+ssDkqOy35+dzhksdZuxGCQ/eJg+gPU+rAkGiYAhC6GYwEbstXEwsqaLNOlrtFUI

DrklDrXDO0qehpyuliBM4+lkyXQoeqJnelVg+6XFzN
8rCj45U87qIhOSJwnDYAmkFrtX5QsS2Nx8kiIQ4mcvvB1b8OsSCLdvEbW4zbrsULZpCwCjADVgpFt+qb

FGhkRc2485UOGSngYr5zLzB1z+wdzb56Z93YXYs9bqbtfzIDtQTRQlNQMH4GL+SxSvVuN7vLVbV5/UGC

cDHPZoJkyjl7myswO10lv/BQ1S30jO/80HeAvUANHz
2Mwucr2DySjOEXgjC2gBa/NNUDCvEJQ6o+EqL9HQHqHfKOYX+a+bWxtmjbDCTQ1IUvadKqlcL7Mhf/Hq

m5yL/166Y6ctmb1LFc1hgrph2G8ozz1lM/gOTbHBxyR4vhGcRPhKTkkvcHMv9M3Y0hcjM5HWQhoAhjHD

tQmMeQjAN/571g6E7NWRrMVmo7blHED9ia3C/sYrj0
U67IE6UzG5CN9cTsxy+yjJTWDAjxuVRzAe3Vs3ATuPga/O1csp59NZO9xOF0O2khoE1xq18NpZYHwbje

iXFE9UIqH6+PONSbQUX+umiKJnLFt34grS0K3PXgEq/c1yQ8g3fC1bhtckGzNeHz2vnZzppBcqUNFEZy

Rl/t0i3YzUt3O1Xuj5z8HiRmi6ggOH7vJGqtxmLaH+
PofPFJ/IeZdbvPsd8NyvMxjgn32YiJenFl6rUiaKkfJyLODwP/Xxcr/1VcNEPOIFclCXi+h0ZQ/f+8zc

z/vAYvS66BeqgOPR3Ka0m+Rmk0cFUr1dWKN0h1xl6K+HHLVcBqx3N0hZnuFJYRskJMDgEe+JHj7TVT8I

fW6ajlSoOGHZmAdLxkCpFSBYy2nne2WJWrpimf06Dq
v5wL1mXgdhN6jTJjop+QD8mgcr9BkBWlCpmb0AIZgbwRAd1KyOc/PKZVMT7mph/7TjY4FEwAMLawuIFA

rO43h4I8IVgw/iNlmSCB3XX5utVQcK8TsQR+Yf3vJePafDuo+b80HaY+l6e89FSgux+c+KyS3iNr74B2

EiknQQZyDg1GOFdLUeN2CS+gx5yYYWq254mFIjNA8u
3/ESYPT2FCpyjnV7Xh8HZF+tMcZXz65Ze1bLx+apriUVzt52N7XwjrlUZ1nN662/mHX7iv8v7fEWuMIU

0Cw5HTJnElazSwYFIq3aJ3Q35kv60qfGE/LkDGqz5offtl2lO1PavjG0JxV6ULUYy/CTXIAWNJOuC56M

KbXIKlMtFvGNjquMyTz75w+MjYjputIdz+cv4ZhCnW
Lena/4cXMxH/Q1yj6+4+qUO6hETR0QAy54SsSDRjG1ywS9Woa8i8n1KkHwwhEaD0zeWW4ttmXacZqoGY8

0U8/ziVXAz7Qrzpiyl3bwec2gjNF6zM8iRt3hZErfOTBevwcIp9SY16Jknvgc8SRsrqQWM8i5HpiDJMM

NGhEEhKYTgMfIj1tAeEuKvp9D9tLX4NIJiG3HFxr3y
sI+LpdkvrbEmJVA055ISUlHdWLz1y66nbTiHr6NXEzKT7S+RRxjV/m4nNsy0rM49xjaqDjfbrlryCq8b

rG97ecd60Vr8sOxeAuyCrFDRxriXqU82F9AKAt3eah1+phCl7nRWtvlykDIV7Ihej+mGv35/rsRGztJS

R9sKrtWTNZeJHDFrAe0ceBGG+1umAu0O4ntQR0fhTo
4n+8dheWZck863x4T/gfIs4mFywYoAFyElNNK3XKW27VmqsRB9t2hPSyztvFsGhQlgEx9FZzSzl0tA93

6trM6Utse3cOSnf5jiqw4Bw5WdOjrYiweurhBOk2HddJ6cR7WZssRsUnuylyKEKWeo6RVeOAY8eQy7G9

rWddyRFh27GaWRvodt9xiDLOlvx2FCAencQlJlRHbr
VsnEKwwHdortaBAKKlKjG7DyzBxIoQM/DALRcyl2+wS+BXIkiJLy2cYY9mpdbsNLPoBc7p/B2QLmFC5M

1+LNydLXA2ChI1fsTZ6yHY20oHIcLVi8LJYsAu05RFo9H8lnoczYxNT38fe7G9rc0dfy7KFMlJYYQWyn

+8nNl+dPVGpuZvIjIZ7OOn1P/8Cmt0buoHiyUZ8vGm
5ha+TkCQj932cWof86bn/q7g3uV1+nxUIh7lsfWbh8rN2Zzv/KwAju39F3BODfs61zn2jBWD2U7PpR6L

BuiNOVRElvYjTWikTNG9IIrJRFOHJmVttbZDNOr7HisAMejJcs1ireK48kN617PVapfPk1qF/xkOoFWe

cilNxQOb97LhnRLPNR3TBCZuvFPQwBbZSRHScCMYLE
8jgnM3M1cSbTRhnwbNX07vVaeSmuCbOEzqbXS4k5GBcu2CXtLTnu/GbtjhoenJ60/nokzDpQK5WlCtFG

pFRStUr2CpK3mnvpz+79P9nJLWmkxQrAOGK96Hz2a97EIiGkmZsCRhZzsPrNs6jfob8mBSfeLDs9p7Co

5gztzd9NFOj8Ee0h5LLUSdVihnDFW2M+nKzoZKuwCg
JXO0vDfURzWp6iScCK2ZF4SajlX7eWghlKOhfXIqq2g89UrFxuf49JMByijCER9Folvq43s5oEIRRUuO

1wi9QM2tqIRUD68aoNqM/xXfRbELlNxNdDlvSxdBgeysqfodfdIu3lLUzF8YTWr9TlAGoWPYeUZ57PoB

krhYPgMSE8Ih91bZkeGInH1MbEmBNavv+cbVVVq9/+
jW0LmXfr+hNvFJhELulO8KGo7IHQHolKsdqTkvmA3WGOS34MRZgLLbFIhqQtfXv2w+8zPikLr8Envye3

W/AYdVD6peg9MNjm2dY3ZEFWsm6oedxXOmw76jN8TROny+Zc+2omMwdoSfxEv1r+Zw/6xdKMKLJoUSn9

fLvKGLdrCq5cdN9FRmxERCzXzmRfe2Gb7dWs08+kb+
OcONbqsDxoT0T+910bWx9eUvQ+3kH2tl+YBkcP51xKPyafraL4ZAWfn74dUJ9hpexqStJKkhru0dnLAz

/MCv+qYrm/6H7zoB/pa4hnSTtGm4O9LrjCIgc/yS4iy/aElcxShuil2xP4xYPXjm2+HN7IGIkaLybC9i

EVEqxTcdrjOvxkNIN5gNSm+ZZpxvgbgJaw3pGqJF+C
r2uyBScdjwYUjeWJLQUNDEYWAPkjo7WMWlWqhrD/0hmHNkZzJ5J69kler8vCKbGxM5VDglh7l+JL8JXD

t6HwrpwM+xc50H0hkVm5gV17jbv1gSUjFNmKFIPF7Lk8QKeN0CpNMa14jen8n5oy+WFLKyNyeoP8cejl

JGoqz9jAL7sdr+FHIfd//EOlPmitm38GXxYOsQZ+Bp
o4Quyx+Zz3DDg3EovfMMq8qkf2DniPFw5ohNr42cqjRZ/jro1QNAMUYTJEpIcCvoeSJay5LD/s/lCZxB

ahVVmMiopXjq+rodVB9xJPCQpuheAXr7oL48GLRAFZMwe/HJNRHqdPcPCJB98akSOSWoQp57XISmKlyt

J9vgy0ItNPYXoa2MeY+AgoIE2PmTq/FsR9hEH/ywJz
AN64nhGyrs1cJMeC1i6lvUFvHukg/7jK7jYwg/B0rPGUjYKmqRa/H2z4QtjT+O2D3dnMgsT1WlfSIOTv

8L0+JwQjHq6kh+DCnW7zbWV+RGrV+o3Mcih1/DQ6n88uVK+Ldyyl8FutN43DKt5hbrmSZO8dWY2SmB/5

XFCZxiQtyCvWB4pHReheAnJZcXccyd3QkeI6kpZlXG
9SLQdL4xJb3hk+FNmnFNvf4irsB48ihzck632WkRfJhxyShTVGf5E3g98hMognU+X+e0cCHu5uGoRqzV

QXqms2LN7BYlFHbWVNuGBLiLDdf9LXCEH6HWItlfIG3xcHeaA3Pt6Tj69Gj2sb7u8ht1Sb+6vrld3p3c

z1ierb5PpBjg+9e/4l7LHdIl6NUwgCGGE4OItAm/Uu
JYPZG0UP9RFajoAVTdLBovfraUgAeusq4WXXCjhCx8TZphVzMEe969iu6LUsp95vKTjhJLsE48pyYMf9

R20BWVRqd+tPWiT+liDBgEaRIlH3hSH5yFb3qlPn+9FSjodzTSWS0C1izJdOSxoRto8m1BUMqjgfRBEU

QEvF/5jrE4GSIQ0DBq/jZbI9VzYyCrEygAS3/7Vehk
OqpeRRhcMKPLam8BvLCoRlSiqreDVqrkmOfwUoz8oZIgq5OyaYAnu4DnINgFq5F1CwLdMIR55kUG2VhT

KsD/nEYqt5ZGem520PRXxD5raNBtuHhju0YHShD0NFZXLZbgNScquWeFFxflTXHt3aFlc4pZRXxDRLNv

5L9BcVx/r5IM8p46J/gJcYWmKlXXeeU4qTJCKCvBf5
EDUXgjDRuMfkhau3hy4P1grcy227W0+IKtlWghi3PLmjiVhVFGWocKbeaoYijoS63G9MveTBzpfz7Fna

47qaNOnllLde/HLwpIBTBgg0GBB3+xIZJFhP1DwCuywMKwKc7FojOvQamQ/+KGfpNXkwmtv4t3g8LN09

wtUiViAmmJ1QG+hKUEJnxvfkMVglBB6Vq1MaXUvuLo
JzG7h6yFSA4AEEcrM+GgoD9uEOhm2GRreh5xSOBvsBLIzAkLiqEWuR0QCjOxCLbg4KNJJS2A+uwx/KZ6

bwPAVtwCR4MjRQk7zQGs+Ehb3rFcRTmvGtunL87EOJowava86tKXSOFNPJnNtZpf+G5mTFUj1f3Js9Ht

s89RB+pYKUCHe9dq4+HdmRuqT276AHGd3c5K1kLXwm
kCp2HNsglchhlWrcd9C7aKzX8QEGLsruBsgYfPBl6SasfisyCi80pIoEtvjQxTtL2krs76GE5A2M0b7y

Q3loy44yjhRztPLkLQst1yXsKadyaAemT6Ns/9XtwCKFkvKRFasBQLea+7iV80UxUE+O+3gFaX9/4P9X

aQM2Tnsw8PDCA3eMdgB8vFehRbFNBediJwRSeEAx97
tCriAQ3C4IfSzrTGEGh04nRWmm5TJYFCNROK75rrJyyMhn8f5nbBXFSxHEALlP6Z6td/+6jryiS4ue3h

4mYyOJu5vT+VFxO6I/boszc2Dq02+dlLJKqslHS3YWKfA/d3kT75XtyXdvN+4cvAoZYld4SRP1bUQ8Dc

amY6bDyuOSnW6bbBb7rVPlByeT9CGI8ZLoXqcBtj0Y
VthNJRnWup8jZxODE/bLN1SKlds+qSO8nXgiA0JUGBU4X3R3hggtt1ojjInzR7hdVZ/2t5EOmFJlvS1N

WJN2/c0HN2Gmzdjin0fUKxbdjsG0faqLbz0vSV9Omn+k9iHzqJ0xfNSgkSrxsizamSm6Nz/aXWZ2H7vG

C6DWBp312TcWmqdMLkr8FIyAQGMn5UI3GiYGpd6ijd
pyPT+9NSp2cpweKuvFM9xVNfo/kW5mDP63BKT8T6SGlByhwAfmyJZgE/3YZuM1G2ku9ABJDj1Bae1eKr

DyOMOImyEYTaAyE+BIToKSJs5WerTTHLlGpuJrnWhovkxsiBPXkY2B5v9lnoCsM+sGzVdv2S25jRNUdq

dvUpfSKFp2rluaQUjrlEhNKPmHlAxNuvYw5ufYnUbr
xYWU8KA3sT9MtQ7lRR5E0w6U/Sx2TCaGWNtyLm1eILf3HWX9+OUI3+MWrVG8aWyO00xeqoV02kmv+Te+

7VON9JB5TUPXwJz+s4Elrv2oZ4wJC/7b4/rmJ1iofKpyKhk7svKpC6727gxEx+On27DIYB6nj2Rblz8F

0gkMUqtCU+MC8LpnYpgBldrJC1kX9XwlJ/7Eril+c7
LW0StLdmEF2zeEcBingKdnxDQy1FHGrHm8EEf/49s+QigIFMuxhG7DPNVXwRA51Jbomw7fSmlgTAxm+2

Q1863Mt82f3AuN2RU6zPmGgRhpDmMO+QwmRVMCVbQRSXQY77v3dO9zwv6pb4z3IJTt7fMPmjpi1pH99b

coon/B85LFGuyWlQmg7OMc6XGKjDkO5yqLuVNJ9GoIQ1
EGjA2oyz3S8KjgFog9pUqqhVRXpQRiBeQ9mXOOCCGcmJ4qE6wBYIBBslKuRamRGU99q2a79nQlJWYutA

4+CxRVHOerltPzEYSsBwy/4zqbZqsY+VfVcf18oN+XzlIAGb5BNZ4BmRn0iyxnjDlVtwsmSISMrQ08Kt

zJ6ijEwmk60Pn6O4D3lNNPj2keqLiUmuLjmYFYmCUk
IAb2bUXTQ5txi851o2UWX/8tM4nZLUeUbH1jM3O3Xf7TGmIYnBKsW6cpQ1In2D6QegzoWBF2oAOR15ZO

xuCJ3u++YylTuYZKaTofsM4q3fHUTRG/EqlWcoL7UrRDp0sfG2wxnrJFK676xCgNRG/rYl4eQ0Gzi8eM

O6pvLdJqzS9hAJdrhmYsGleAxcATgUcaq9pBqd0I23
RSO2ZfMJp4AwGU6sXfW/Kd3lMHtBuE28d0ODOhQ4I8KbsTchEhF60wuZ1EgTn/wl0WyccMRPVrDb0uqd

5GNb3USsZTun6aIH4oi7hDKuX61ow/V1LX2QVXwhgFgiMunDCvtWj9kmsQNIyQLe6cjzr6TDgVvWDmlk

rmCiUv8PGmysJJYcz8xjUOTPgS9Va2Xm4lURMgcwtA
hRlU6zNV291bBy/6Qd2BNjGQk+dG0/URbM7S2W9AgSchJ1mXCb8tvLmww+4ToeMnT/1VCKOOEG5X39HG

3V9eVGKSFAcxA/cpBOVYTTqOXcUmNW3Gkex3RBjGW2zedwKtQ8rDKF3RvFQE9nE/DTOm17tOnDMGkz7k

im2/P6bDtSchdA/dTlVlTZ2Q4Bbq2ejOMNV/TnKBuA
neqReD0dT1ab7C1Lla/tzSO8GilNyzDweqdyViwhnDiOur7Ur3hf8C9gXHoFG/sShyvD2iTG44GHv+g7

5763zOdE6WyajCuuyW65PqJ9SCauon/QnHEcw2L5hqNKm7ekrDGD8UsGDYggEKVm4Gq/MI196V+jp0XD

j/KKzUXH4O883uWfIwQIYP4YMgnVn57llaXlnxFizW
N7t8FcacgyptX7PixGvV70R1nw7pYmD5JxrTyZIu446UW2s/4Y02ugo+xvvk+jA6rVPuRVNkkNdkgsSq

6TIJD0w9W4TdlAMrlCY49+sx70f5eNyCwTo4/k5UQ4lqaG/KhPVua2vHJRPk8DNF7+Y1zirJKmcviCpt

gGyoabgF1uTgUDa9TwmyjQkEMI9vDy41WsSYG34rRv
eMQSanRjsfun6eNVwE9BkkZ0toSHoPBr6RBDaqhRvAAHYSYEM1tN9DJ4qOWPRE5uoVvuG1Xk6TMIBXUx

hA6wP7sx3ADCLtrOqm3dl0ywCwGaG0rPNRndQmpqXq8tLsdvs4wjCzgT/Nf0zUtni1EVGRkon1GT2a34

hS5xx6wtcOwcMK1IdkXGIGW9rX2IbBt3Qg6A6v5grJ
4kNNfpsL3vdrRT19Z/maTqO70oMgi+S8J9CruXaT6iPuQLReoAjZ9ztJQHLMydAtY5KZqnTz21/QANLu

xoIuXrRINLdkIGuUsW8WfZeA0hdbi0J5Q/36xR04Zcvap8oEqKA+NwFmUfbsAKEJWcPs9z6a4M3TTfO8

7lmMnT6wMBsM1DWMdkwCTA/buhlamhjOhBJMjSXSvi
v03tYokXKv/rE/LowxmkkNamz6qFSnHPWhgRzrsQaGTdqBKKQZOXv6/7NmaleWMq/pHlN2AR9GX3LAGi

itnnmd56VCjJHc8ppa/Hs1WNxahxkKR67Vn7z/pwSM+DaT0FEEjxaBAksS3jwZucDUDVo+/GZvb/XuqT

3iirrTd5D5gOG8UOV5m4z2eabRSwDzLWVap1pDP+jH
LDeu9jybB2mYY/Pz/9O5KDazAl6oWeut7/xnj2SiY2/zzZo3leR5TeiO3MTvIVbQ4E5o0p2Q/v6Sh73r

0ZX5zpDWMJy4vVAEoAJnMvEyCpXYGr/4a3SCf7pUynCAU8WvNx+C3iZuTzfjVJmFckzEja2BfoO7V6Wv

T42IyP44QJe6AyqLcrnD1TLXTCejwZe58HkSafbfld
1Qi8VhCZ9q4u9H7Mxj4UOiPeM/YITH/5FSZIBN4Y/AjCGenU1Wh/4tdpxwF8qEA5x8QTa+L0PJR4Yg7g

yzY+rD4BNwpafWSrD2Et/w/jmHt5Xh24Z184ibKiWTHEWkykPaPZwwG/t0R8F8JzaoPYCJljui6naz3f

ltfzatAJVZAVctw/1X7RcLjtMjaMeGodOZVrUtSWhW
+z7MV0tGB+PLHByldWaeOMpNSHQ3OlwFQUybe6MU/UL+Lrlh/M52XMohD0cg/cIlbR2yWAeMhhUG8kd9

TLaljExxl/AWoOfujzPlyq/h4Oiz66ZIV7l2hd/FmRWRNiFe3rHO8fBl3FzQr3wrKU3pYzLtA6Fv91IL

KJFafq4n5pur8bn74M/wW0bwfxFyfzPpcSqEazL/p+
nGIW4B/c6NrCBhGmWVVd4e79uQeXmYW4Sk0m0ZMb4K6Wdwx8B7CH1/SFDYNogXsZ7WvU9tFefkrwxwTK

nMn8TDY2koPaDYeo1N76prFOueUKaG2wE5YBv0hNHXj0DZPIXt2KZOOHVWB2MH6Cf82bge5xbqPhlnZ+

ZC4+aKKEQuTfMWcBfL+6ox+kh1hWyt97f0ubdKHzx4
jz5jaV8aflExK4LaSbV5XKGBhGcF3Ork4GGEV4Eg5GMP6Jfjvs1Gzb6I9iV5bCE+eZrZQqTYa37ZyxLy

jiqs6Eit0suRy3RHTIUMq81Xi0wUGnl7JGq3hCDj2bsC1HQCgPrCYLv2wnfySCjcKBNXXnfAUgISvLJK

ux5Y38pn7La0Yq1i+V575LHgh1u7iukZ0F0tLpelCM
vZDqatTOX/1VSvf/+tisufEY0V4bonWf5/lst5HNP3L3np/bncZfgBDbrEp/OLrvQQiBgdSB4UUpUfiv

xHBlyzI1id9c5MYlmk4WdCZ++XpeG2YAYWfo56lwo6tK4qm9picSDLOEzXTMKD4yTWRWzaFyS2U/AUSA

CWcsswubStAIQ3M9nPaX2TWDO12gHBa7FUH9TcgiKd
sVk8qLezFTeN/z0TKhhrek9okce4CRV12kZp7CffvCnH48kYAYNzdbTzFtM6m+S/uq2HL77tzNddh5vr

gFvfmUOVsDJdcXBeqOAT5F5oGUtpehdoYkS6pc4PbQ8b9pEFZZDEhRcybzk2dX5PO105jNMCkWcaJHOz

3oX76uQZgWL2Bd2z7u890paUeImg9foxd2jZLa6pmn
87P6rJrF7NMMaRTXyvw58pINmo9LnA4+QO3akfR3aKYkhZqcVVGBnR1SmDXUlyoTeONLj85YeRKXCxlb

taedoAcmykJHUZu+eUV6VD+5eHKbBtZs2JriubFSnnAWiJIrkZuiSSh7CMYU8HsWa36pLTjS33ZPwaSb

ayzu2mWLWL5BnYFsJsAwP05Pft19ifSBucGb2CdYH/
WqxUVw6BWkOQUAYuSoadvnj160maiTPXAchvoIVYzKC4btIqCLujkVdYAjFGn72RFfKGmACxbXBk7qBh

+QLq0gQhKggX4uPEA61Yr/1xSSjbccCTFNpYO085Gu5p8Eqj40vHA10nZaNsG0o1yj5j+6ye2Qtny05M

318lKPlzqfY88KAgHWWv7r+g941PjECIPotwvLwvzu
nmdgl5/uqETyCRejrL+7o9Bp15WeQWPhj4ulnyEeFnUZ5kxRCXMmJOE8f9+knXHUOpkhNDU/A42PjKtR

uBiqjXV2/OW0jkQlyc9Id2gd3YrHSK3xsaI1evfjIcRXI/yAR08GDIV6aDRkEY7kiq+OnxdJEgmiQxnM

0FnovR/RSGdKtjmEi4LI9bcFXk10ygR6T+1pj1+zHJ
vmPgSu1aCupXQL6nUJOWY124r4VM4PitucgEHlMLWxdwjQHaW6/bSNtaBm4eb6mx8bIpJEDVfsc+GDcE

FE+Mgf5PDNLG4rjqqHv8X2sWIzszla6xoeBD0tmytL6J+dgeDxK3oLS5P6hot5PqBnT97vLohlofhMN3

raDqBA5Tp+P3CpCXL4F2IgkMfuf98zbjs1/0y/Lkb3
8WKMzo9jgKCePoEc3CK5X3AfHoZDKWnHYExsae+35vfTkzfTHnr0UoxI1B40UJa+zgLmGrY85jvEVf2r

/rN7Z3xB3cpQwh3gwqHizbecPxvSF1gd+60KJJGG45SyNHvyUqatDCBpz2WJD9CIeijyFeCTTnSrwlAH

axN0/SP1vQGh+e64fLFP5dlsmC++WU3c675iRNYv6T
m/7SzcKjjGpitP931yfoIuAwu6CrednnWwc+YOPNO8ugy3p27AV7jFpf47LzgdC5HZaPC426Y8r6t3HT

28rkgjZn9DD+LAQOHniCcHMlHurpFECycut0AMLQDiyaarZtd5z7t1SASldAOOXsFGY0NzxjFlHW2SiP

2wmoe260CBiQcncZbWWUMkNPECTdqyCd6zurcEFWAY
CZmrwgurp8WTeTMZ7DF51utHaQEPBCdmZrxR2ho7BhaGuG+IyDhzdRYYqAFRd+4BMHA8B7JfNY9wFJcQ

v6LuU/YJ1YV2MdyB4Mvzhc3jgOGmds0PHSz8wpuaZ+7uzYG7opds5LnqB+JuEFqm/NMc3+DAS00+E2jF

Rr7Dm4v/4lewyYDECoH2aQKMtwGt1s3D/3tJTrEcjd
cl5+UlE95s+CKm4WbJEIzzbqIumPtF7PbPk/VGNamqe2SU1CF541EiWksjAv3AewNUy3Cj3D3AHIqVdY

EuRIpmNZXZUcTwpDfj+xPR/kcwx3zsQfroqwkwBfY+7XhXL2a3EHMWF2IhM8RLtXEPIc5gHArkRq5neu

RdcBiZhKDgoQmutW5jHC3KHoXo4OckRw/r8IXTCiEk
nZV1VnU+hXZpD7OtEk5SBeJ9cdxBIAxSpff1Vih80FSO89i8fAg3uDe6YgjQZFb78ujxL5ESnLMSVpGI

IpBwdId8SnfBsmFQAkKMCuUfp1lJi6LwV14L9txIF8srQyvME7wd1Y5mW6dfmU3l2kGnK7hUGEHLhtWy

IT2o6EobOZSFhCY17v1TsLrAck0m/PqrecIZV65L/i
kXC4qBpYeGWwvIGAEaKDQz1fwVuFfYL7vIeYgrYCMtfuxyCzpyX5CIReLpDMuSZdK0qR7iwHImd93nbA

IYknIl3IJUJe41I3rBBrOZ3yXaiHrSUXhhkRsa3GFxsOmmzpy0x/zXBnoi/3gW70zq0wGV4mtCBrYfwL

/VQMCRjdfenDb8fPNb1ua+kwKbus5OfqSPJ9pnU5+/
CFxjhAe0cYE8qOOHrahZ1IyFf6cZMBHnXAINYOz2GNjsDSZGH4AfK3l0YJ3qwy06HGI7fYRbJv5GST18

A7rEgAD7DHPzP7+9VcSg0AKX4OgG8BenLdlDH8dGI5V+8Bfax4X17f58web/w6P4rgbOR1lC1fELpZpw

V3eUTm22WC5D7/fk79br6a2rCTfbS93TJdGl9GtXsq
9eTRzrzaOMux5LFMbixQwu+qswhOJkWOfGXUjtBjCEnDY5AYncpsyfETMvcDVuJuQSMJW3QSmtn3ThRp

Wh2nr7lKZ2+gi8SrvrW3XP4yC5FGbd9IaIe69p8uxnutq3hN6w5atgF0Tx3FyT5pMq8E282tfveFl5XS

dsMITWpWMpkrGScgoVYfsfEcOXfdZ5mb7Uju40k9p2
tmGUw5JS6CKwMSlt4orw0ULFv3+s9TYcKdnLNbp8iUCwnzpxRJS/kTJnq8imaouwmlmelnqsqRgs1oMS

91W3daaKOTJOKeTb0dbp4/idrqy66UfZsf9UyUBunQA7iNTwMDdJ3G7yoQC1s6/JNzalRIF/PrRy0X6l

UpYREmn4a1Z6N4ghwNs6c9N0Uvh7E267HFZbm3p+vm
eUrPdpvcQ9/A3CpIb48akAsZU2RGY841pk/3p7JImRA7P+/CpV32Gf00Nb7Se3azVmUcttBpwXzZO4lH

mwvf57sfmTQ9pLnKfQwoCa7iVloxJWZ+kqM44UvQdFiBkuF5aq6M4UdlhtUe/dBxJCDvBO9fLMk+Gsfd

OfzvgiQIGNnLYjirpq4To0nSMeehm0W4M3dkdISQV9
CWWRrg+Y5lesFkPO8yKePaQTQwsxDy+MH2mDlPzv0jKmAdM5ulplG/wse0Jx9EfVRSJZDin50dC7/5LS

Fxi3EEUgg3+VJ9vAWnVIvrwyrMIYVv6RHy1PIi6D9EbOVzEEXdUvyq8rD3Yo4Os1mM6K3XSiAXFWKQW0

+H1VB7ILDqZrNsdnqRl7Us6BwYIns0DHKag7lng2IB
t2hUv/Qv85rF5QjLDlX/YOZFmoSWeBq+pBTQncCOjPqwc7fxi+Qfl8yX4TZNtVFu71K+KK4+MN4qDQBh

+8wllVxfdwtkt5LNJgRJp0snn+3d6yxJZB9WHo/tv5qKAhnCbbKhVp3zB182BEdSz0GnmHwbnDmtMvEx

Z+6D51AgajG+qpDnJi+VoIKg4KhCAHzzzCz+HV9QbP
ppthMLoF/kcNPtYXdk7nDnuGXz1h05SPqsOIotK3wa1jykcOAlFnmV1ADDWZrX1vvyrj1JrKqkwv2nYf

fcm4By7ViOfOIX8FN9WEkheVJEKuy1HjO5i7ldKad5tdqn/A4ZmZ+ImjjPDB24XipYvvqzBPL8LgG5zP

r6Gi7dMdAdsJRydHgXy838xD6iWpsM4P3G9KZts1HR
V4bdz+Cff7pibJ/MJ8GhXhyJF2GjcG118BVMqbZgrQMfYHOyXHGGrICAkmIR/EAyMwma77LfJC1UuQEd

eFvV+uUmoWSpKVbPm/LTfyTnhty9rMtn7IUabfV3v9xmgnZ987J94d+bZx62cXLPFT+svEA2ITFkSrHB

uS52zWI50mYsBC99Pt4PEDZatvn0Xtjz4mdDKUqpkp
QN+5Tvld8j3ZlU/twXg8cLSIQ72iC/n1OM4ijVKPcR3eB/PSpibRg9qP7WM9zrtirI3j0E75Rf88V6Lx

U+SGZE6TMTfBhYtKtXgXw8WNkCAAJ9KxTldVyUo4JFN4Q5v1mJkGItdz7rFQPD2VJTtNwwPoa42E16tt

5zUzU7eUKHjhsEK9DIVuii1EyfTcEThlAhOtrBr6Mt
zvBaB7iUi6mgEkggucgB08wupD/owLaC1QDq6pYeXtw5Mb6t2yi8p39+z8YTMVDPXNjDHMYoF1U1YJE7

R4l3a30lxa/zj/z42vehi6vayLIT2+wh5x9SBaGzp7ztZTh5KMYQXkwrRkP/ipj5lhPzcBB89r0dLkB0

gCbUeMDaTBjVOTYdH0oGCeFUpZLSAeYOMu95prCVGO
uSNWrVK4ijuX57ujRKJ7/QCC7+lNt1I4JW5HtgES2OfoEroVJTrTxm1ukSTVJl7/IfQBL2bMjf/nVKKj

R8AnqlrIKZjUNKTgSruoixFoi7sVzPk3k2LJqlmFCCN22w0QNe/UhcCR7kylEr21G84FyKAJ5xVEhJM8

41AYBxMf6p8+buy3zfOXRst4d00op2FIdqTgY2UzwU
arbomzrGacHTkEhN6PzS89Vtr/nv+e/jm//uVhiK8k8sVvvze7DU3pUBWFBuQEEdSwiH9nNurBK4xSdB

f5lV9InciWAuS5EyQ5m+wQHXUNM1imE8cTJ/MfntCD9FAMlUgATX+ja22JZPypxlYON/Wkm9QC0jpnDx

7mtVKMujy4G6KVmr7Ztl4Bppz7N0PF9Vqpt7KTW8nA
wKta9PBTRXgzb7D8ur7I9E8RkjOWh1XwNzXju2tRVO9z1tkXeRexRRJcuuz2tJTKkM3amV4mOEbUEfkL

7pX0qXpS6o+m4LtwjxFPQu+v5BjtvFnDS2+iZwWjhlf0bVt5UaYfp3uDDQhRYl2lz1fUV3VcISXQs8u5

qShSGLjlZ2Elz2rECYqJQsCl/mr3o2zP4ddHmc1sne
C6V1RrqCBNgfoSO7CyzBKQ12WNmosujn6PyXC8SfSf64yM1JG9dy2PyjfVPV7zQtz83XtwrNkDrWjDFU

CHynPVF+AvenlrOCcEGAdcovEOjaIIyf3pijjMx6Y8ALx5/6XOgUP4tf4PMeXrry8SG32ss/H1odIGOz

ubeK2CSnzsdYdjfsVk7Gjv5gmeTt3eayWkZ6KtJqZo
JkyCtBBRMEwau1AiIy1Gcc1KNrKMpMZcpufYuBr2EPDRa1xj93Cp5GdWYX17tGTQECgNhSKsOVYW9J6U

dYwwZUaU2bWEgik87GvmYPx34TXIB5uBdk8Jy0xHSvhzTJVbJLOWLkD20sbsDKqAwdb5UHjg4mroFP0J

6TH8P8/dVJL2Re6EMf2rpD4F1z5+JdLRIuCm3GbF+T
zzty/eEnkKh5mbNBS0cMndNlldqFx8CNAk/HWfmMeY00Zvh9OkyHxVzOf3+Z92ReIlPYnkK9wdtd2avn

lXqW0sAD/fn5yLQK3HgAN6WPuuQ8zP8xXtgeF0G/PadJ0de5yy4dY0/rJUkSqLMnXLasJ6r7PBUhyqs+

VuxHjgs7iBntTIBNlicxYDKuOCjx1s6vJQ1A0MNeGC
9AkHDpq0H5VYdHOGTo/zhb4rH94Ami7uu65h/biTLfZagP0PTvd5IvMXh7z7o0l6F9DzAN4q6GAvDNdv

85jqFdHJvPB2UwqQ6KCiR34cyQmBqHNAxWg3MKqDTf0Ry/rrOQMR7ryIBpoqW0oHnwX3x9ld321wPW1V

YvfYuvW0MxiYrvWt2MfHfJsFYla6AfoE90wYtxFPCn
81TMbkt5mTUrgggUG9wvMeih+tjh5dU2gHrIa729H07jkwRjDCxSxVwVkcscnfFJbu1Yd/fkBzBwq0SJ

/y36t+jfon+L/m55kb68//W03Mk9M+JKqwhNlp+DzOFH5J7qMiCjE+GJfP8/8pt3KyiiiXqm/9fv3P+8

mPb/ICJ1BkNcy3LSWDysy9YiXXhufbu3+cXWIqL0vF
9YsfxcruERFlKjwMnnTa6ICcPrXo6PH3+/ECVBDVtpENfwRVc3Z/QV2LNxstr+VfwAYZqR55qLva4EaQ

5WekBqVnA8euHcijh1RlwQbdMmE7bg/eONCT/wyo1n/oba/U07GUD3s8jRdiN5UWoAnVW+SFf20ftHCg

9Yn+OXfWl9JAgRzUifmeegI0KxVihr/wJCMk9XUAsY
hFqnmv/lYFsBe2blA3HFb6xNoJ3ScsNHxtPkvNyqD8B/AsvnNnazr9hhWvtjq7byn3bdDQwvz0RanZyR

IgczISwdHMbPpzqsnymbOgij2B6p3IjUuTxtuIOTfMoPMi0UcbdrvPZa9rSzCD4MjF7m2kchi6Ai4BOi

mDOrHccoXQcn4i+2NfzyFg316u2s9iu/z9aiTz6gtl
5507lu8fVkWs9+vCxCPUXdbXuEexjwlaRhBvFyIgAPbzXGwfO5ck30nkIyA+EqXlt1S4XVdt6/6uX9v/

Qa0GbFw2fcwsC3Jtx4CKKyhm9SHEVwN1gCbcUyh3qcSSL/E1LNoIL3yql1gojSd5eT9vyUuP3t/dqxq8

aJURxVuuT/d9YU9hxI/snX3eCVx9MTWcU9Ldi8QMMS
SZV0Yqv7/g0hp5Nof/wMA7oxtNMTK81bWlagdoIaw6B8xhAbeZmAFbrAWnUiC471P0gXDoo1/VyysTYI

wA6nLeZ0yZ8il/4hH1Hovsuv544uLx3ISeJvREo4fy/fO2kZQx2E9Mw/62LBCH0/Nmqev7URgRnofX1L

jvAF7sgwrWd4QNaJch+U2JK1D0vfBuGH+u9Q7W2l8C
rIxu3rPjdp2QWRferDS3lD4CSX5HV1J0tB5u3bJAKkXwweCeyLBKQDwcZP/G//bt0csMBu9+NhL+ti+C

XQtKS1xi08AgmUxZk8Yvg6Mm3gY3mF0OXJWAqyGpm84Wn7VD8mFk8/UxRbiwaGdHmx611NgfntQtlbb2

ZQp7J8vi87sGOBTf0n0ObfNxJUcq/li22bZhOrSw5Y
aPQy9dMOeccE8t+mSh4sIoEcY59PClInTTBYr+/SfTyU3v/SNaJVKKlv31LzvMxh3VW5lPwbE1xlyOy/

ukTEYcIOPKu8r8+8/4dL9C2gfvLGdhZ6zHtzI4h8KTiHjvdtMPENj6SFpdJ/RYT6IwDfGUk14AJYjbch

vokYKwc5RmHCRW80qcNj6M9F9N/4VNrtN7JQcERoOj
S4M30LXYdOq4EK175KrXBayem+rgdmRCUq37IsLM6Yq3kNoibAXrgGftdbh0xLuhst0+ZcIR5Dsr2Uxo

Q4qJX/Io7/oAiUTK9ckqdTgsekD0UG+B8mX5oS98bLFrrIhODoRjTpBXdtzpw4omq+9aYJa+IwGw22Za

oiDkSmO2wGjj493OnO4c41LK+74F0nbZXXAsSRKTQz
iPUy4BailhRCG+IhhXBfJRm8NmdzjLio+hc6v4A3hOcHxbQnXQDOKWCU/Aj/RbjMVj322uglnPHUUf+h

7klAJk8SeTQymYXE9lhGh9SAj5iIDo9ta7j18yp76+Ut49qrzoPZh6BpyrweSuLzpkPgAU3cPdEridoQ

XJHu7lF+yl/34WOIbbCCpQTpf7lXNINjTnJ7PTMlnp
MaaO6SZ1PCPiVhxFL06pJd/p0PxIPJYwKkyyiemB4dtsWQ5VWTlB7q2H89ZkzQocK7J7nFzDHQX/oMbi

NrRlMK/H6rOIxKmi1xg/TH0TF4vsfD+E113X8/xU4cNEFEBOoDTxpqwX470dCtJsH6CpuoqJq3YKJ8QO

O5Cvt2R/K+uaz8IgDCyW1opkll9WZvL+0LJDRJC3wp
WyQuNtSyd05/r3m6QIOIXa9c9af9/3Y0M8RHoU9LJw7mYIXLFygsUBKqRf790zik1gf+Rc92EnMyClpK

Z7GFLI8xjyq8TklW/at6r3o6dJSHELab4zmzOdD26V1dQ7BevfDxOZ5fvAg7gOd8IP+iF7clj3V+xgIL

JteX6dhI198kqaj+O/1eqV6JzsMxgHBbm0sydeePAV
yjX/qcN9g2lJE7i0rSOhZJpM66k+NhqGyBig7K8MW9M/rIjxWp7LKD6atsXa/Ph9MBwRnAYE2naA3gtL

3I3Hq5xPYrrwl/Uv3TzL33R3Ns/zdQ1/z0sb6fdwCMSsDQVxXdgLEG6ktEv113Dn+QKtLlHNPDD1WNXa

tphjC5Ca/j1y7pw15buJmKUVFD/9GI432lnXmOsSMO
0d0eHzH5L6xEbmvaTmEcLr/0V5bkeQgORg+C/d2BBvHTksjZFP0Xs/zcSrHcwxT0Aka+l1H+s4RXjJr5

5ebboSRhKlhmKK64Ui1mYxAtXLmHCzFN58HqU8LDtE0h2/X+qibRO1pX2p+gmKyJRK3sIJ0P1ShZdeSn

r4IuxcMOcnPI9onZAIAgf2J9K94oQDybp5KeO3U146
hh2zvd/NN2Z9eMXLlVCn4KG5FalmnMZgSclnMcPRJ/ur0KKIoSUd+QIjivuSILuMkTSpK5OQixDg5oyg

w4jjpx9aITgpB42GZEglutoplqNFrkHwT3XgM6/sWU74+6Hs6pICO7E9KWAjIgVB/rnGO2Q/LK0id/Mq

W/iQGXdGzC48RAmlOj8s8AsiDWXIzjUUgE3doo8HRk
xZxI17viUSxSW1plN/w4m6A9gsm8cTRhd9e/oazpnX0e2ECzQAjcWF0Djc81vnI5ss9b4eEx3ysyN+sc

zwVYIVLOpb+rEcNEf36tRY2kRzTavlcLXgoqSzucK27xMru2IjkDmQ/NYZBjaCyKUyH39BNAMXqU+bTd

RtlM/5McTj2OJDDvCkB2/S8SCxfkAX1pgul2mMU/zF
TId8B/QizpyM+tsfdbEdMX/mTA+t5Cvel9y2GxaWNGsp/84tOT874DlNZE10e2/1m3TqKfDMoXhZvJ7f

nxQTWQDEXhZajJy2LkFfTPpCUgABG29eVXfWOPpx3ejEzFc7rAYWweZKvvqVHr60QE+e7jL1owIOmT/A

XqbskvEjhk8lf9tk6AVctaf0i6ZztRuxYtFHzibJA7
zz3e3QLF3J/wkv9XarpkfP9KBVqFAltNffCY0E1k75196lm4dqX1bnYUDMHiSeGWuf8FdBbm5C7ONeXw

TmovDYDyH/wIDFRwLBTNKqfZTTUY0ISmZITv3NnTW7pnJg7anwzjxQOKYmKh6iWcN36sl6+e2Xdv/Munoz

qYWMJMermxV876ve5qI1l6BOcUwBW+hja5yvxC53tz
71AGpwM5OnvUxe4Sh8G2zMKHQNIWcRRXPHc/9cG2j0IiqePOyfQUg6Tx/Ey9oeKft/O4qCWhb4rLCqbv

PIsJtd/kipDf9Fm5KHeoa2G0CACIi/3mVBBorf/E0tIS+rF7HYhPl/7JS5Kf0Nbma6KqOoes265hMNa6

shTH1Af0DeyeTWoMqqx2/waMJT5mbaWK2H54qGCbdb
Z9hL+DZs94m7ZkHDf0QVUJVBnYEviATwkKD5fzK467Cpmk6cseT2YIq8PEyrvZ2RU/kX/29Sk1PtFkRg

cJxfy6FnnblWgCZwPDVoQRQ/c7+p1/gKfyFES+Xf0/497X/PtP+Qh21EtvKi0zmSZN4LI1J27jytZr8S

TTMJIaPPR4AiwRp11gq45mHLrbbS7YI85EakMY6O9y
xMJFv0a0gqwFh3LoGSEuLVX/3ilNKxUquOnasjPk5Hir62uBMoNqdGyaHQhcO+qNOTCXC71xwfOQBPpO

AVMaz9UgnbCDMjGjzc3zRk09U58+vTB32HmQEg8FIu9OYaeLGZnYERNrWKDu1T3Tk7wizp2JK8SUcyfI

BT0WAzVvv0EMjcST13vsJytXn5SBTUZ/D+Brv6MOE8
l0PXEPtxTae6nlGDdgx/6oq1QHhAn3ROUIiYv987cgLhaR2/DOnSDTYGvYlVkGcABQHIstut5KzFyuaD

XsSrG3Qm8t5rRYjvO2o0+y46Z/7Zrlrb5MmeqAuN/XxQ8oGFOh2XsAymfOU9HJZxjY5hkKD/m2RgBz8c

tg7+q1TU/60ShvWAl3QNDAJ9sYmJwOXeFOrX7HwJ8f
6+tV9TEML5sGDrQirFuhcdCRv+lBMkfs0tyVQ/XmZqAmfwJ9EefuYeAniiL1+Pe2VW4rgyIqEiNV6M2Z

/3Db7K2ve/QptO2TAJ1CxTUfWd+qMdktaYYc3Aiid16m97bL31s8up4Za4soKwh6EcpE6g+QB2uXGYh4

6u8dmx2bMxZtNjq32OhWyU1epIYZzOa9UazT1QBDHl
cr2o55vUOF4YeaEM6mZ+xJrla/ygy9586dKKedpL3QJ8DBkaRXkIs5KLKxmX2oMTLR83mXtTiV8znRg7

ssP52oBaHzhehNrAhZjtR678t+DIuG35c2mpaiOEUlM7/NEtKSaYKqpnmVaf1slPDtyFdvGUQCZBt1yU

VDltIB1/sAB/zEkgSc+ydPI6K5+8qIavBWzK124tGM
2JtN2gfOwc7eUU9MPENrTiRcZ6pJuj1eN/nvysSj4t5cvISu9Q1KSy2j5WVrU7B2VloC/jOot9Lu2Szm

1Ps4JleZ9NLHVpSqjqa5drxvNNM7K4gALkowG2xrgfOP8fVpipx67fxvwCsA+I0obEDvWanoFCxAPmac

qnTvbiIvUk6BKEqc4jzW51GMuBpi+Fo863TDKDzKAO
8Z4624A2ZhEzj1qq2RZniC4T+57G1PziP+gnK4QXMh58roNXwc7p+m65duw2ODfTJR2MUIZEo36lfG/3

8N1fqDI1jLZ2XIDvvetW9y9K3J3v9VOWGt5VWzzYMh3HLq8zzTKJZ2ItFn2LjxEEVW4nmfytuUa3dhIT

iWguAfCQhrkuO6yYMBaLsXPlYInZ5uA+BWTPIHGuMb
1tf7MOzs2OfbB6MRa3+abz+5NB6Hkp+jX4EKtzQu7yhJdtJ1jXgF52PsL5A5EtMIRSiuRzOGyHgfZBTm

RtxA+EQQepK1WlxyhuKz0oo1NhSC8SlySMr87xLeJnNq0lJXsAGydqqbQRjSBRC8GGlv/pDJisjp+Ppt

E/dje6hWv8/FlFdd+b9dX0lXahCghFkZbjXpWB7/y7
31Keq/HXthp1m/RDTggU3AZztuE1Adr4R9NjiFg1tGbkT+ODzszNycKoQRy54t+gkZx6+HaR1H2NVXm9

8j18wthm3Ck3F1SohM9MOtiWsZDxJeMtm7cj6f5fU/n2taQ6tX2YGSpHmylQ64DEh4DIKwjI+DgpVlr/

KmpL9HNkWgUTFcN+qvhyH6b+BJn87Ypr4vJGJO/8ES
KktM6e+s8MtTZo9m8sa/E+Fzpzl35svtAA45I0AlyLfT6Ps6RLIsZoDQgRkj0mC6LI4oLGXM1tg4PI3J

vT/XzkeBy/gWqeivbHgXy54xsUR8ZgXOhIga4/HYE1m5WALu+kNkOiXWwM2aLLmJ1C4MMviSlXkqtcrB

eQNzghI90jo1LqHpVscvjfGyNsKi060BDxmSH8nVpy
4QhijG1+Iy5iQ/nK4v5mjAppQxnCm8GGYPu5npW/EuULLv98djrKzIH84RugEy8+3tBadyV9Zc3KH1nP

fGpLEoo8yPGIhS6uT18XxX+K9RhLNSmXi8tFdhMaDFlXyS5xVL/0vgpYRTBFvYNX94thS03psk/FqFrK

wAz8yAQC9VEIWNVA0sCUPAk09qW/Aqk/XTXeONDI5L
CgKMVhAzglbsmwO8ueUFdFAM6J+KhttcV8fe3QPrJy/4oOtW2zly+F3UmxqLFd+2ddBfS1A97geTfZz/

sFzHJfub1JSzY79RaqnvytDC+CKm1ApxrMk/gIPJWuUN8c3iRfZj2poOftqwe5Dum6R1KjsJt3U+/6U2

KONX2GBsiwLmOMoV+y7XzMSQW3jy/BTDUqP9bq5nw1
Vq8OcLpFrUj8AB37gG8I5ILL4bIGZymHKQJz180+NSeW+k3X4Hl8IdVlXR44p317Pj3QrwdCKsaUz3bv

Rv7dlWYfWKpTA033SOb3CF7TlHmr6ysfp49MP+RSG/0QZ4bsOLm79hJ3GaIuxGaVJaSq6UNuQmmb89Eq

ArQuDTM/4QmAu30eXJYjrbrjv/n2uKRWJ2kcVSLrsA
pBDPn5P1bJ7RS/kLTPc+hE7WIQ4CfKsMoQuUNxErUn2z0eml9137KsHAWn6eWemBOZJeRwCJkqmIPPz3

vTxBROM9Nw4/CyzUgXrghysXemkwjHFT4DBoT8ovjS8HGrZXgt2VwOZv1WDtyG9Pf3EeJS8Ps1hzMUHE

xzddiDD0p4hSZyYRcIFwlhojpsSKJ9R2yj01fwokEb
pAfZJltsI4yRXQbbt9dmL3mHsoKOeB/q3lQbGQCfsarlnpuNw8xXAZZ7WFT3rbvzvbZO2foG788tjS6S

JGSsqmC33/o1LHJdra9Yh8OofwXfnTBjBu4CE0tsb09Zdmvpf4E/IHK7uIpxc2H4V7VaK4HRDXiosKCI

fmjC41IXxp9NneqpRJVp/FPvFCbFj6mxD0iUb+9gZ3
YJ8ASe6WG7IR2AgqjasrrZag8QxrDnbrpOAd3/DagV6DLq+LcG2W3K/0mHdswdOBR/PE3gJQ2q5l//xF

hn5YL9QCGDNwUftuLaoYTF7xz0sdvvh3Tx2GmvqeHa4IOw0KIWKjkkHiavrPwISgg+CSFTl+tRQpbfRd

3vP5yFg71XgVixZ2G0Lwb2GWbmvwxw3cuzt43i6zzf
a39RkfQLw5ZIRO1868TxWXn/KFsD//abwYjICNEvIo9be5HIkEb5fJ4IaMYtU9i9JTQIeefNb4rIzzyY

mXmgXnJujgrfmg9oGHoAPo3oQqk4GDV1RvO54UHvWOqiZwTdnEmz09zzqQAKJknzWRBIPff/LevqbiPf

9BIqSdtFT0d6PtuxgrjGXDG/5KTtDa1kBl+YqTbWEU
RT+egtnZoVttYRzK9r249OFYzF2TFlp4iCA6/FgmO3BVbB5bvRmepEV1/hzdXIXnz2oet6ETy95mOPYi

TGDw1oP25y1WzhNzGyZc+++68pKFEgG708ifYxOYxRzDyImj4leAs6ZxhRfGfh715+ZfK4uHLmD6CDCS

Us6AqSnOba3wBaG1+jWtMCXAStxbrA4Cod2bOi7+Go
u7p+VQAHGOAiwVUUd7Ea9AvIPAs8lCnP1MAHkDkQTvkvjkk7hrXKf+Ofn0lsboSy41yxkRP+jKnrEe9u

rjvYBMaFwZZTX0N/livypM+vvIRE3HZYZHY9h3eshxJjImWW4L34LpH5yE7EjSWw0ddkfzLSTn5LXYVx

Kp1KkVe/rtmZqTqRGfTxL1ouEXeLV6+VKsVS7vXV8l
9FDhsNReJtQzGKSrhiKUVFzDp84p5lL6Iwbvcdyhl7THYkZDCQ/tMuKnIF57G0agqbNkCnq4798eCCTt

/jbYV7+qsVseHH0j0eubYroq8EB4juMSdupruvMeNhawrwWGfAKPrUz2Oo0NU+d1hBXas3k4N3xg+b4x

g3qoBLhS5fMvxy5uz76rhhk98a0KlFjH4RZ0/5a+6K
bek02ARL0gpERso2fwld3Beab7lcg/NsMhOYUbsQRzAaiPXrT9J9KRNl75Lvv+V9vNEoMK8G3C1dOO6E

saZhwToqMz4WJDiKpgtVoXJHn9q8GWIceo7yOBeHJ0lH3S/+nFDgKgxLBjE4g2HhbVxVmTvkol6Gpqcm

p/ipAWWsz6aE7f0/BfKFBWU/B/OwfgvgD/tFUQw4WL
Ag7ZU9jTg2xseLsTlsL19byY7BIupKYsIL3QaNaPsEAfL/U9dup10x17Bd3NWhlWdBfLWbZIW1eg75Cc

1HKx1xCXRGos14gzC9Gm6ze1Egj6Wh22m1TQO9ud0VH5Yff+H9L7nFi1/WItaz3u9+nDLM4F67pyJBCm

npBxuMUSQ0KM2XAGhAR4tGiq7mEquFbaIH9iBoRhnI
UudyR0TPj0pJQaBzkkGe2FXNco+80G8Czl5fdqriZ0z87ZpdF0h1RlSse5edPzAxRjSVpcPNq5yYxRJ+

McmXCEvdc4j7J6D7vjEfoDxaiSjR1wfArwCCfL0Ps9L9T1hwYsDl8XcLieKxd7wJayLQFn8LzrBceXd2

9EUkhk7pejXFtJNBbejJFmRMxMg0FmTjdb1JIq7QTl
A7WiAjE4di4Lf7y3FUIUqKOx8lxX1RP3bXV9hqKi5u+Kx2U/44WC/aKwNL1gFi8ceuv8khPsAOgsuHeC

O5+ci3tmCTpb/vzYR6SNkMIa6rtLZl6zFHDplXWT28k0FT9NEbvzzLPVO0vlypfIJg7KkjxQa/zWNXOi

5lOZBL04xMGdMa2vt3wAPlmPhIZMOUsNM2WgCgprTP
G5Bib6A1G1fBMjHsTIsJz22p5aFiidpkXK8T+DSHbmSrvbZEQYxX1n8EWrNKf1QkK48vi5tFHw25HsYn

45wfTW7L2JxSV4FwpIPU+DtXnSwlBkYD0AdcsBCkAkZRDNUyJeB1PH3JhFpkhyndrhPYSMYfDAEA2zrE

npuWoFxxM1zqH/TTPQhOx18bISy4DbFKa0c7hs9Di8
anSYHvdaz/LY0CXnZD/eYrgakPBdq9yqTOT/LstNzxToP/a0vTEJbJfQWpK64e+trIYtMHMlqnCyCrWp

U/73bxZSBtMczR18JARf0kBNWnsfQcyB1nwKz7vzW4kxEa5mIqfsOm8TgLPM3wKNt/YACsChtX5NomIV

NUwRrdG/htHTVL1hmIjJNINwRMwXkEbS2jQJ7YBsLF
KSlvVh1g3/psfsufRtDTdY6NsDLaqau2Dr3Ahghz/JaBOCRAc7u29xenzbVXxjtwygm8z8GfHkSt5PGR

h1KeDVAMLkvQA2XdxppeuUisyFlNZ+4mOSb6iBYHjZQhJ2+5enE9j8tYIjfjGULduRMNjyeFv4zqyvoZ

LRO8cgXC9WHq1K+2RKMkQMdBqrPvD5qUU+/n7PIh5v
YK+ZE+I89N5AOp1xRJhVyLOBxl9hiylMGKcrItqo5lRcS1aWdlXJLJT4c3E+scSaNnd8gRoepQay+x9o

0QHaJ89l1joIXne5MAKGAlT3wecEtvFqq/93vMMwjFMpakAPMRO0M/IF1FIEF9GKVbXR6KL1yVN6GQwu

DUjkqikk1VKIfH4kKMp6eqmpc6djt9g5hE0nwndTrb
nRMFi1nALg8d4xh6ckCovuHb9hlS/226cp7IA+ZzQ/3BiDnRoOjhvO3/bRBsPNuMReNXHx54FGURffN2

2g8ifoe/loRhOZQE7JA92yd9ee/6x9RGde5F4cbR7BCZJfndRRcIPBAmR7RisXiv/0i54ozsJcsFqgf8

4SUtbSRCRrvjnV/wVtfxX72b0L5GOSEjSFapPJ4vx+
Ju0XfTLR54GtL9pX90jd164qzuA9NogMWrQOgzD1ouerBloDRSrzOe6nIxkcLGUDMRV52jqDHNhw5XqK

iUa9xbYiO8H7Cx5ilwVYFI3mJBLRH0ltYnfZ3mXaCWtaaqrQfozmUAT2iV8044XQsJkvw0ZVWmYrNyM8

hwgM1TuRgSCea3L+jG5YflR/gb2PGIPOKlGCa7qIEc
LunemT+OJz0oLzC5tu18pkvCk9EUBh7TtY+JWxEM6ZR36U9wysmLG9K3/RV5K3ghqvNmm7BTImWI1Blr

cch/IgmU/pj5B4nKuJfdZNACfl3wfF4m7tW8A/06Da2YyBRs4FohPbQh6VZaM+v6lVDq1WiOU6lDxX4I

rcIPPiXi544aHk5cS+p1J4ooGbCmhCbcGsisp2zY/u
rq6E6clL3honPyC4/VoEQ4u+bE4SBy20TFHapfka9sTQ0z/KsvHrDUlsYgsyy/AGiQ+TWO1nPlKW37sb

UM/28fHr23PGCOtEjq05AjVqZqpEQ3wQf66BQMVchny39qvmaYDkT4tr4aX9isgCzGWTe33SYRXWCa0n

nUm3ewVBpWSlY9l4x1caRGjdWHL4/Ww0rA0RTzzLL7
8T2/ThFq8vJsidRnFbW2XkshxFqop8CRshbHOcrbTJXJaslwuYYRtmhcLH1hHObB4bmJ4YCYo4r7twZs

Ezmt4BnO/eiamuNO/CulOsM5UqdwrfPIAvU3RbP8u60fhakYlyaYSnvifJr3Cb/UW6U8P+vZ9BHVAqR0

UyZ3nf9ZXcgyEcjUoXwhYwUqEXBpn32S6U+XrVM3V3
IJSEUJWRhYdaAxDOfCo9WDi4xLQteoe4AVJJHOPixUlWDKK2tYZRwUTZg3YvphaSAomTtw4Arnl2kR+s

na5QWOroRwyI8AlwqgZNmVMcD6Bkgo2rCpjLPVtU6z9knqSHXGHWdizBYDJkEtrqA4Pd3oINy3ckfs9I

0zO3kGwqLYqXvZsl9hq4H+VUKn7AG0DPpQw6GZAIE3
dBgco/4H84pwCcNVYs7Zk0G2Ab0+1Ky9WJ8xMgYoVwC1naM/IvDowOQs77KF3XTA/J6QlZd6btK9PA5r

pjuhG6T1VLnOrZfvNupXteaFBrwosiIdikc4vZUQY48MvLLCcF13sKvvRc9Sli0AIEWK7G4eSYPWZNK5

bY1ohSYA46Uwwc1UjTvymnXsi21Wcdaav7eFWEZIwC
1MiHNi1KU8JV+mQhCcj8nwX2rQ0rLfuBMCH70J04At5qNoLPv8JZoTu0JTIAqc7/gxZxr4fx2kBYSAWi

dCZNLa1kSOQ1VdxitbNJMtPc5KD1m6ui3BUxyi1VFtSE3YPJKxuyl+0AfyS6Q7LczLDRwFfmodb++4HO

BJboh+BswMVt7eaXZboFd38gkSXTIaEnhl6PxJCigp
QeeI4xAehVTDs0cPsHlPqcteYERZjKssnMDs262C4T6GKbnue37HZMosUyHKB5j6lKU3bVDdSXpPt53Q

9AGYbeNEG2De1jlllCj7fB8MVUXXG4mEDrduPSFOjarTTfsWg6rb6aaAX/jRxbqiwlbg9xsxnawagHu9

4FOiLFBxsqzFca/RW7zFR+VMaNaeBaAsz/ofDmielb
DVr3ALgU2sj+7GiLAEODZrdx7Y4d3xdOWhnlhu4x8E740gmy5nLxLFMkbR3g6kY9vMO2g1q1cfWApOuM

lFIx+WppzF8Eb4yQdIeSiuyfmUKqD8B+3PV1BG92n8XFAcuRCpRC3mrSttVj/7C2jCA1nYXlvVNXOSsH

G49q6mN0Ul0D0djB9Vlb1mtSpaI4aqSmhLDIyGHVYk
mURSECVazIaxEQcSsZd3HHszJ3lsOCflHuC4/GZ/f88EMTbsoMDs2BnyHh30/HE8w7MYlAqj2FzX5fal

JZXQAwbe6udIYNObO9LA+THwBYuwEsf/jl47VkYZla7hZTRRvgmGQTHNfMczGBCIJd90ECIonPvjHGmD

1tXD92N81x9VHU4X2sS2Kpfpm4/k0xf89d6pQr3t7N
tmn2/O/zMJsCwt6S+z7vu6r+b61oaqcJHIInv0AqEvQRv7dl8jmzPGBT57JUbKUYbcAvuAA0Kz9sUihW

Q9Nptua4pQ4hmL86FJSNEHv2BJEf9kEZjY+USUlMcY9rK9Sf6nKGjtPudd45D4X9bf/KAFcDrd0rk/G7

wRU0a7u7wiP+0ufdjHjjdVxslPSmrVNO+yFmZepxK/
8/J+k9Zrc7ohOdAalY7PU38a8SwvQvM0bFQsG1E6o17wU26CbkUcDofKpvGRdbVt2vvvuHqbJSmTmF9Y

WU91xZKi3BRtPbkv7l7vRprYxX7fnW0vMCdzu/wUk8w50INHqRaP4PjfmOJqcBIRiVoaN8Fjy1zuSgOS

fMORpkIkW3kEq5Ftr6kj0z7Y8EpYJnC91PVyGktFDj
Sgac3xU6kfw5stGqu0qYtm47VZ8CMUi6Ywy7E8pWPWtLUe31DuWNpRdbUzklmXHttwKpIGg9uOB96ZW7

1FSfGgAByanT2VvuuClyF/StJLSZVdH8wOOZSZbvWcEB64RnouOw2bgIhSpxyi0ziu65rgm0xciGnlrM

ut3plbGnV1SU3dLictEKLN1kp/k6+QHpp+RvecMPkO
9zuoYOlEZGN1+rC7/XRj7DPqHt7VXTKWcqF4LFgCVs9bc7fIo2Lz0xyu8vyJ5MqrZtCLavQwCIXlNveu

nxB8gfgs4FN3iOHFpQ+JgbLPAS3LfGC0hNmjcYuIuNndkKssCNLjpfK3tqSjK1gVlluufsL11q7s+j5Q

8Ko61TozRNH695e8Y82uVlSMSbZK7bEgD/5bXfxroi
7sb2P3R+yoWvdKpwiSFIe6GrMAOgOPSsSz6qs9N2Fo30MC4/kv3LTow++WhKFH3U8tuPPa6bLGe0Pz82

lNJr9zwRfAL66fCT8SiPHsayqiH7qDpGkFT8wmTjMhv7+kSc3StxDI1WBTWN5PyVL6b2OzVJ1+w3V38g

ABG4qcpj+VpQ9zpbI80PhgmNsKplzT04vb2FVTZ7OZ
w2Z9Ua3y9vkXCZUQdG527LdN9MhRbgFGr7U437hX2EWAfhh3F7E2MjLKDuBGjH5ZZHC0/sOzAhtrriSI

zCiEHIVAcVqP68ZhTi52oNn+csNhKqt+ISL/yE1Ik/hvx0lMqz4dbcvu8bNdd3PPEJKqLmhlztVCzRaT

BEcqDB3fvg8Gp+hAslDGHtdi8t/m2baAVF1nVW6h7d
C6gUwParbgstP9kw8titO0tKSfpeH7EPK+oOoMCWmLAabOF55MPFArE6mf7bwa5gz69cmjAhKGqzvMsQ

sA+o3cv+tOLSeHvsfKZmBed5e0mlKmt0DmtSgAquni4luJP1MP8VOSoNBsw/48ZeN4ExQ3SQ/si6DLy9

muxckNUAe+9EICuPiOo1RR5nQ/zpUaUshP4JJNpKYa
8rV3DSv6YlmVDpqWXYca/zIPt/6y8vf8A+f8CT6c5FV9GP3CE/J2p2EgKhCvX9gfWJsxFkOVo5rCyHSs

GoKUiFGUYneWE8nIFtq/ackGmM8fBlWAjay+/Q4HirjKERi0ZYo32/EdDd4rPbEfq09LLQIvbuVff9p/

5EfHqLvJR4LP0SxV9qQTGHXvSAfn2P9l5kafNeg3DU
jE3yjjliumooeqhUko/RAyDPLnnEF9xq+IuMiIEoAoK/r2TrEoHw3X8nqqwT/W0QB4nQS2asKipby7G+

62NYigbnV1/TICNhoS1aSsjCa84BmdRzsISbQqCxtRBKGOpHtXU94GCh5P2NlYTyxK+BqnDVwkAI21Dj

6HRLsUeI3sPJMOd2pVLDpr31onFEunpiG0xNkCWv05
b5XXOfmVGv+ykRXPWfY2HtrnDrRPwvoMoUqMJgfOVsYfZyaEddJjCTZ7iH0q5cwbRltEHHTrDlzEeVoJ

quD5hRQI+9nMn96VpyutpBxylr7rc1F9i2ymdM5/yOd68BJs9gbfo/EDQLaXZmXG6lD8SzQx5+XWBJ1N

QpsTYWf//uP3nw4jrNK8rEYm3f4DyDvcCzFJp8buVu
PKWO/h28rr7YVtwxGvP8/1XmPCWgoJwi9xqNIxmh2LJCti1hwFrG82HAR9er79docLp9RSnScEqE4DW5

xl2Vpa3xfvfoWWHNaL0xHIRmXC81Rvnm68uo52sEW5wAjx11LNhL8b3kE2U0Qg++e9RlG73RfmpB6hAk

UB0tNQAgeH25e+4C8+2i0ZUB5HH3qsfxRnxCtKZEWk
Y79aLUqTr4mULGyGM29Iwykg+qegnuUvtJ7Bw8v0sI8aGpENL2ia/FKQ8ypQYR1Zge4g+jRTNGsmOZbb

G0zIVPurMCKX8eBMQzVbWfS8KRtBk88m7hYAaHwPF1CmWumkcmbNBbwaYEZ15ew6/sQG3UU6wYj90a4s

jZZx/TE7toTTAh/gkQ18SSzOC0i02zQjWmL8K4fhhp
OihWbQug5W63Yh4FOzQzdRCvpFbG57RueM8Ne7fM2EXJuMQfJpiQvJjzBX92US6DyZGfm4q+lfAci4Ab

icUQKqKZ8tyLS8oSaettygVSJkaY6gEY1+2BDXx72x2reRM4WdeGrUkUKknjVDvM0/lp68PqjEZoJXUh

dZ59dmqh+kW8h5jgNwB0enB502/MXr+IBTQJEm5wcq
1bYeOuDXWUFoO9vo0LvN26KQtBL4g5rwrw6kag1rq3N5hG89mjNEroNHu8eMd43vYeNtbHBxWX+YuwS7

EUqXxnme7Fw9w08g2OIRUcoHoS2H6b9po1sl2FO2gCDOuuHi0fgmKuDI2yuBNqN3Sxh54P1SL0YnDViO

Spa9FcZp4UdBEtcIZmF4x5eH6ZO7695C9f0R+8LcJO
NU+hcO3VjCKdbrYX35q56cgZzwXxgQRs758ZSWMdnTq1LShs6Ajq0DEFaxj53Bed+0RqtgPZevuTAHez

YOja7RcwPLvoeL0HnxTk/VQolsgzULDGOxvta2KJ6SqCde28cNU4/kYwMbSEgZNDyWhmgSwSEoFotbwJ

7HGXO5e5CxLLMazOzjYdFnuAyGpdBEE8uPxhPwybtL
e0xSAIWlDMf6PH+kPvaGB+UaFfr1FdUTHY2MAI28OcF4HXlhKzYoN9qKskNpHIOZYiA4ov4TNozPU6dK

abKETEW+Tcdf/73utIT3hBcDVVDrcGoSDjRx2zLz+xA2P7AfsQmol7mnkr64tSW9fnMEUT5mWDaJ3oz/

xsUAdIWhUaxGuSCBi3rzAaXkPleBqdWr+FgDeiPINb
e2k7NBz0ecTuj+em4J6vz0hQGo1lHR71sbPkk2N5GoGMisx2GwTU3c+8LKVZbMGTW1RKoGXiFje4tGfB

QHcmwgjnojqNkkaYzGluqHMI2exEc92B1t6L3gTqdooAB59UTfZDS1h7WawtR6SCzS49xFGHImBsE2kS

Pg2aRURJ7PITXei7Pt6udRGDHlSHG9Mc2TQHYQ1s20
bHdm/DLl/t5IIE7RsjbrXEoagrZv8lNDoPosaTFk/uTa60Ae6QiNoHGwYWv1UCoif5eDtqMGk/VXU6TZ

GyPwiw/qrS/5FrZxOK9c0yzrflMlQgLa/up7CdQvcK5CMNA0PSuBtqSuWwG/24ftOuOYWHLJuaMVThht

ntJcyqkf5CEsIoOwnze4iJB4isBZVCaQT55vAOWWcO
tLnWk9X2f+J3TabqaF8iumCaspEWo3DzG30oiD3TzPV+zglqvNc3oOfo35Yoo5txdbiPdizxnOQAzKzD

pezMl0r1Ic7zZn9AFRVbjSzZAthBL5lIeDjqblQq0cez5RiS01ySu2bQ7uW/xQpzQSTqBmXYvFmjvorf

Qlox9rIcIi+CfMPXjuCV0LAyCloFWpX1JnMHs7SM11
PC9x9DFbCk8v2Wrmraadj0DUMd/np2PR22lSESVLlnq+D72el+1czU7NBW8hzrmFckZs2MzdkMVMTJTe

N+ll/tupE+ti32+wX05/i3WHmiLBUoMUQSt1p3/4EN8Lu8SOuc83byh6S4+9B0Pc8BeQcgg1DZ3jJrzh

n5NP0NuVuvMrbPz+wPtrIgbIJFk4WG6acR1AyI52NO
zBYiqTsS0UUiJh+NIifOhal2yYm3wnrp/O6NepG/aHn9d4BtM9sK+E3gSkffq+1t5Wpqa1ou/790PtCK

0RTO88zWb3UJ5V28W5u1PQ9+P5YSqRYpaito/Psu3Y4UB/D0cePNbpufxSxTlJcKpEqFMoVPO0u9qOXn

4HOx0F98VWJoHxvjO8l6vr/s3K3t+wblxqA4dcbkD/
o3d3lqke9p/NKHN/NAkFhJhknsiEhPllDFNa7vkiYsLQG48Wre+NMRYkeoHH+lqDmwkT2GUXcBhT56oF

4L0XhzgSnqGH8SrbwgRmW3JsqGE42kE6QMyaC5OOKu3LXvEbu2kstWFY6Q7lQTKULSAbyF+HmHgK1hnS

k8PQr/y5taYnTvHMK68krbxQihROZtSgMjW3Cpo0DE
iQ/EaPFRGDN211wFbT/+si0aewTAbNdO/x0c/T4Euw/AmyN3AJav8lRUMWgEU0N8qe64hX5uirmbatzH

8WujX7NJUSfy+IYu++cVN8BS06ZME/itUzggvJxDSepCdTMTDOisTg7bhewhUOh4B1rY1XoOMbthlkSS

bn3HyExw245Y8M/P/0AFvmnf/AAsdBu3Ts5FMHM8Sx
IFX2nD1vlK5GY9wigi0ZF9lO/iarn4rr862Z1DkcqKrEmnN/5lu2Ldv+GTcdznan/V974//6Z+37/5cY

9Djr0JOV+jJw1fvd01myDETFCVjGwMGkiSYEkLEkaSRTm7AIRY/1S//X4V23+T6qqoDrgS7VL48NjVcB

XBCPxp6i2Y9wptGhk4wdKLzSZrKagtQQcqnqRLAMJt
YtMw9KchOkNoKe75rPE3G6LHjMsrrTOs2y9Yy/Cf6WvYLmc8xi4qi7cirUgG9llcxtTZvsnmGL2NSjI+

01BbjDjWkmne/rKB1hrH+k5WrnNnbdo1SA/ZTBExnhm/FGmw383sP/xeqFjYLvr+g+kEhWJcUC0iY0Oq

phNTBPl/cGK6MdXEcBZ93YZL/nHdMnGAzojBI6XJgk
4ssaxb2ixzkDbn+FrqMESPY/r2BNT8JizuefwmHloUg5y1K2FHPnT1550SwMtz8UKlIXd93CfiWE+j5z

cbrzuo2TfOXX128LjgDBZpfUVL07vn5n9sQstgIg6RIO9eG/yhTRQmX73IOvW79dOiEXtOULWemGA1zo

FC6JJp73uuhhOHB7wdy94K23dvk8A8INtjX/9eSiQu
1PLpHXT7n1l1fyzvjPFvW7rd2gJNzoVXgs6QKsE58TmYLt0YzmgxvJNAWzsJi9LRbf53k5+UmsNe1Byk

YtAE8pN5OcRNkU2VJ+I9WcybbkxEPRVG0y9aUPFTRqZOfS9wi05Q71LATD7jKP52rSWG6L5Q/21krF3r

2n8O8VmMyOFGz31Bjt6dnOj9cdm1vJ+8oRI0Z6N07s
q8GaAiywmPNSMlW5VC//WDLRbcx8AfCitowPLMCfcDkkqz+wJOVnyVfxyxSwyNKwejvXN4mVdobbCOsD

usGj3dcqGG/kcO5Boy/H2K9OLD6I9/AJzBn+nyVmE1tbqu6b/qYor3Wx/Yv9KC0NP+Q1z8rCpmMlDfGl

hJzYSCh8t15omcgD1BKnabh2t8Uh+qdb1Wrkcx2DNy
U5MPo1MdynVvvVYo4L/gdqdXP0x4FnMf1zdaml0ylXEV76kLCfdmklxCf+wrMZJ3kWdFn/HQL67+JWCt

eiwICCT5aGMMb+xH9J3pZPDmoPQ/nw3nBJWj+S8X2o1L+RlCpNhTq8me+/XIkdauu39j25pv2KJ+rPhN

p7LCwM/gHP1YDPnGfmay39YY/VaNWy8lAEjU/+o5lX
qhdwHuWqCip5JqkYc19O1kCOFQ3ZVQQ93lza/3GxBvayW/xeDHSQHoZMcW4cIQH/0Dd5LfClbc82NXWI

Fe/ca0fHaTSPUixegvzf63RYuTMtAQVU0KL/gxXLmrB4G5OXK1A9BY5Of6VyYne6u+eTiYxUKBReBaxt

Zu54s+Ga/MtjNRP25NlF/DrLQ2NyAYc4FLzSraOsWT
x2rR0VLBEGl56Rk7w89tsS9zIv0rhjPoIYEWf0dpqdjNhSRX/pE0w1em53TXO+nQJqbpQPeE5PILYO+q

rlczKzKyMfWloDNaF2saNaBOxyXCq0vzE7p6Vb24uYcY+gB4o2lw0x6HZ8FZ21jXHz8026Oyt047MSq2

bGovZddSLk/Q85y/TOu2wsSPF1LpfApcFZIYRgJ5IZ
txddJpZZdyYyTe6U3sHusIObFXEZLnxRlT2nLkg6s7C1K3oymwJb7n46ZtKjX4VGgbkwSrPLWUax9tbd

TO1ZJ+m+0iV8bHI16MDJtFwwHw74yhTn72tj/HPn8rs2ue0ittzEF/vhvGQdA5LnItqZvWjDPJikHiNo

dIrB3s/LvsuBJtUIZhY1AZWjP+9C6K22+oMRdU3+WT
EFZ37v+tdU8c7+nYHNppbQltl2csNNEJqZjyFgDnhapB8zsrNKe6qipVuuSSkco8HHLcWaJMA/uA1pCR

6wXy3tQns8BBcLohwzDI3SQdlH/9ACfCJGA1Q5iMAE3xfNRW+2WWB7JVv4SRSLCS7x2Y8dY5/QT8TL+9

L37wez/zsqLpVSLFh3K18rU2tcDKlhDtNX1n9sle+y
gEePg4qiWMfJJ+nzf3mCYPIt+6Fy2mbJcmFJH5+eqtwd/9HDar5n99KHpngPeDD4s+4b9Dbnzq2EiYaQ

Q7wDjfYOz4hvdMww51lhhw//Jy6fvMxrdfTFKG29OX83MdNjYLE1lyfu/0t03RoiEGPTJ+0Bvt0pU4AW

qcVvZG5JyuEiO9M/7j+ar5NDYhQ2iUbSjZKpDwqUAI
CbMLo4LWf8e515vQjiGOeD6hn5QSAxgBkra6zWlwcYb74LvPWZexG8UVwwI6forxD0rV8faKBEkYuomk

UTkYUO9eQWzkTOLaOpn8u8rABzmVQhocyYY3vQLDDftmuoIsdRfgl5N+L0jaKLsNnp0cS3Wa5RMm1kgw

TB75h5r0/18+MduDjN+dmt2p44Wwv2vPVKdIKVinCs
+w+5FcaAYFu7j6TPDgDy81tZfSAMB4b/5qb/qnoWiVXYDctdNxwmlm82yra7VT1mhb1V9T6EsP+LzLGN

oKimyiT4yvW1tPN0C+qSaNvSVMdHD4/E4XZQ6L6ctDHiab+N0GP8ZjmJ7jQMeSovOVaa0wvmMaMZbJPA

0uztnXWq2zAz4978lDlp5v6CnHO6klaYIKgXCjv/Tv
OfssM+3bvZdXMD4OQ2iUih4wWFDLf+3dOso797BZRLablgv6oRTOAAAzXsGnGohOWve7/wDIpc4vb4z5

EFVSLayOBrBXNc+j8//Jo95/wp7OmmcwCts1qlexbTGz/EovJRpxiAOdJaEby1XrnjOoEUWvukfBZx/X

+kfwIwFtlbEQxAQz4sKawmzue+qXHqiKpUD4u3g+lR
FID3hl82tjJAjlXT9LaIFHgrY0hAUefeNhk4DaVR/sAEgSFFiDK4zbXyDWyIzHPDY5FFOqkwmshi5J9P

vyj9cR4lg4rewsjQEiAk1ke9kyLN0zgxp4I84q/4zob7MqOh9BIiBAkIDDZr34umH+GqO3Iap88LzVqL

YmS/qNZ0YsZFFglHmj/bLvhQsrh8yEFkEPDN6MrIvM
Gub72+0kDa5Mju/9uxBnyTKWgx0GqEzwVlOHSySJAnJQBty7ApK3/5vT0mDUV2Q8GLjuOEa27rhPRcwE

tlyCZfD4l2KiGk6Cs+1h4MhBr5VdbSqdA+UjrdvzD8/EKewXpIN/XeLJ5j+fnewtEuc0zQdrrphKnKOd

Wfnb/pf6q0hg06E5R5r7MH5L87cefpAaoEz1ZIS71a
LbrpO3jgejkq58VRyCfVL/6mPh+5gzDajFDxDLy9nQsn2AGb9sCds4k3P8U6kP7rra48Ar1eJKNbdjEY

AYnZKn16JZiMBxG92EWjmhb8Ff4Lxgk1E1F7oaCzsh6+amU9j97HXZEPvGQmV/fK/4gZWVoG7Xoysuit

b9u2iyM2QwI47akk1eqACMYXk1QJkncgVJg4vO23kt
AfyTiAsdF11THL5dYD1oFTH6I/nkbe5ohRQyA9jJcndlXcz93sCohsUqfyV09BKJAZ8nQJnZ8RUrqpz9

JNWMWnt4BIeVR4MGWIvNlYz/mgxaDo4mOd035gLwOD3Q8bJcI8xGb0yigYHzcY/pgCgOBHH2TfEWp8k9

ShIQ6n9SGIP5NAF3g69j3yy9oJfMiv7yg89nayQQGG
LLTpe08qozKMFgZIC0TedN5EvdSkJfXZNdMOqI8YGMJyzN2RpNsiiwWgjT8KNcmISbgeTmiuJuZMqglA

Zg8fx2Ru458NViwesP0OJL1gLRLcqQ9CGp2p5hCwv2WmxuIe/4hCjCQNo/ODC5o95yQG7th5OJFyNa7d

jcEwaYdKOqpvBw+psWthxz3a5Njk1ZZ+Ez7BOYZUa+
E3mDeq4BrOpZ2QHVchIGxgLtChD4OX95Dp++j0d4PxbBgt/PiGN9RDTIT/cVCI6F7wAcT3aFRXTv0uys

eidnZmv0mu0icdQi7Cx1EGcKiKTJk2AtV7JBlZ8b86034DUGjuCLnws5wCb1wz5z+tMuLfMaoTmRVcl1

8IPONKlarDjtz2rjZbgIyhqHqwKQnRCLAo6mOF1HRj
LaVkOnvwjvJrCBL81MBn8Hbj6J5fTsEe6BlhCYz/vbYdu5FH16rv4ND4onIU1rOsfBnLYQN6pN9ZCgoy

J6PRv+XdkEks/yc4Gkv35OWnM4v1y/k33Ueyo/jjDF8XsPY1lqaArtzWkCPjryNGZCrgnC1bUy/wF3ZA

M/Tsrrn/8CN+ro0Lf0WxBf6TH+WtjJ92xLg/scObHV
Id2IvtObpJ4aHo93W2vXfeK+zwNuMrNyMIHgbrVXUsWQdGz50DOyguwZO6iUqCKie+2hmGqcb4fE+5Y3

QfHPsZoOHOzcaCSLz2M2FP4s2cfh44YngbVTTDxaV6cqmhmWKaSzoLE1VfjplGKAnhhHE4ttjMmM9fkJ

zHoCoOl2PK5jtTyti6q9fz1bdm2gYgp63hdVZ7DX9a
LsxnPfWRa6261y5q346CKiSabvCwglkZGperbTmLDzrBi8sa79Ikrpp+szj771/OCZ/0OdSpIYJh8WO+

9evU4M7O92bfrGIDQhQHV4Lot/X8ScAjVJm0TfrH1aW4OCE4XdgjFb8lhhtQ/zj/j3/0OORcjis8si+z

tldfZqOw379DNxmv0LZHY8kTnSv/tvokzBy8vN3ZgS
AQT7cpIEzq5nN+4XP7oU+15IELQA6zlGgxEyuuyeI3tatj+dVVtSLN+uELUxdpvrSHbeUKhLtblMMjuN

eu6jk95M5TBYXVV7XI217Bdz/x/9AOi9EAjfE2RJnnGMJ2HoO/a3LenAUUGkDC368AZ/6fviLwv+Kqwn

/jMW8AItA5Z5ZLxWtJA0PaZSfs41hqJkBNWMfArqeV
P4V0p20e/piXxeqUkseGfx/pQriQ9tFo+EhOrEa1TROZ6BtmzE9QYLgUNoGT6WiAFMo52fuX7FFS0bwh

yRCvOLoxRuvh2lg+kQw+ps9eu5p0nDeMcT4sJJDqnqYmQTBGcbZUre0Ct9S+rlJhMlhtSc1jXxOS/ggX

28lHyvwzIiW9PFLvOm4HhpSnNmTjQxw16VwXuJm49w
J6Agezcq2HMpzJeo0vEVDKhYfkP93Nr6dbHCgzMy/2nAC7Qh9UHozEhpx/hZBrN6gv09/diWWKG/NZNd

BEnezwk3ZL+Ct1pYX9ZFTOXkuZmQdOWZUtFj4KegS3S2f0K/Pj7qQx7YeANXy/zV3qd+hxKXcr7efCKp

v7O1kVm8MLrvs8a49N08waFLdRjJlj5T43XROR+Lkn
1Pj4ZhLJMcHPAx4CoaDxYWgmS3AN0HQMY1Io+BPivsspCNpwWpbSk/2heXCPNey57Rz5KFP1roT6vp2X

/zoRJErar+ZruxpA5X6MuKXg/X7bfjtD8+R4w/WdT5gkiJgHLrPmUXGPG2ydWzCWiN9XWz+twWzXjeg8

Sgids6Rbhj8TN4WrEroKTyOilDJroxv9Efjt99FCFa
rCge25jKR4J1FmdF8fgdEi39UFIyvuPCLvPEA8YzsBb2ouds6zNSCK7hxpX5hssLDAikOGGSFzkoz/zF

+1zdfXahepcsz56PTgyHImBHpnZS3F8rGDsTqIdpox1Jpv0MF9OSEZFJamX21T55Ijqqd8TxKL77zake

jjXqe3xgz/UnZ5J3nz2Uz2eoi8SUmqRC4gp8eBeknI
0G5R2NUJgaTxhn9eCLSB5CJmRcYRyh3aDfsa1MAoS0PegcIevGPrxNasN33J4C7ZiN2MoMyhmbejr7as

pMhWF08arpUxdVHBxM1Wk5DqkqUIyXJHaI/tr9kI0VJutcqMrPyC0NZTvxQCYEtwltjn9GBhvzAEIIhV

FDumMiCoYuY63zZldF//7Y90ZoY5nOv5kIg7NcToIq
jEcmS5BGDm9oOue+byDlA9lH2samlua15/CneP4uyStSAz1gcaUX4fgAQWw35YpYDLBNEIHyVOndz1ht

WmilS9e9Jv5COAOi1FkN36iRBcWeQoIHtBciucisE699jI8apqahcTqk3MC8EBMNiV2xzBJs8mvyEKBk

oRW/ZhAKgw4kBit8O9tW3tR6fzshgPUJ25JN7ONK43
mDkPSDDft3A5xqk97A7nqY2sLTa3hTIAAJHauYkQqpYPNWlPD0O8zlOa/LVsM7e+Ldn1LxMU7lD03pQA

ugI8yTDfrN4pj/25M9JsDRpFlF76F4440KFiW0pMTnv35/KWKpmfznF6oujl5mOsDo9qQZFHZTM3psvi

f4yIeolwJ3D9iAepMx5IqILvjJIDrwcjHrEvrw8Mbm
XHdtg47zflmfDCTuvqrA+gQ0JCeFz/qQsVCtY7K8nljvq5Q+XTp6SPkUU/pCz2nlvWJz32J7F1uVKKiF

Si4uAvzcl8y9FKPa/Eo2umgCSw0ei8e2mpYjViWWS7gBL9sfucfvdjlvz0Eql9drJIhJeAna1hY8b+Pf

[id number]3c+ku4ptjI2
tS0CeyK5fTR66088L2j8CvKOUz1W3/gh/WajEu2YyYFKOS/038TfogjzeRGk3aSrPpVlOjh7r6gavE+O

Mc1Lg6jjug67Z6viumMEGTONWKEj4oENIIEP6Qv1UYJ1j9s8rQ3B4Qk89dv2AD5G9PR1zZ5icHCEe/M3

/QnqA3YYbkb9PquZ1URZVXewZVOpqPEp3wGv/SvmW4
TmJeHHJfwqeTt6ouuMnZk5yVOBDC8cLvqVjzBMrP2bVTNDGl3jgLVqOYemBNaIGFf6ko9UjvBFe2zcL9

a6VSWZu6jD/wP4hUUaHWk5FZQovrR2OkWoQqGMMgGJnqlyC1eEXUEFacFwUCuVS0qa3HKKBnlMJNDDhS

ocAjjgLKvVtkO90py/2swrLlqrU+j6wFq0kwZshKVa
xc1tKdMtrQRfPhnXVB0qsVOehqmPcO/uOoHlwnRiuD3743pIzT1kWapLeU5EAQFa5zaPGApGs1ppjYMj

f5lR2MwzG8KlUWo5jphzzw5Le5MX4MeS4Nfu68xPO1Lidij4ZM/7dt8UY/j2LNNLJsixKsUmiWIfpnpJ

GwXiaDbZeXPYPFPwmV0N3KbfM0ABYIFKKI5bxYisQ1
MMR0yxN4eX7u8UDSSzDksIcFfxse+CxijYYeDQxHXCk9Yw5Tp6IrCosr1VPSxE84xFSLOwuVjS6Kd/MB

PoenlFbL0MVDuFn17lmeh/fB/AKTpB1qBKIadroiKQOeQkDIlDBVXNRQchcuWP60do4UL4+TJyVp5DRw

fkCRaZu7Tc/jVr+2YI2T1vxyp0la7d08+vB1sDEewk
e6wHT5SqLcqeLqUEVTD/RYirqms/Lg83Begk+ZcYEHczO9cVe88MaYI90sjc+GLSo0nokkJ4jftfwF1O

+/uF+/n0eZsxVv/9AD1bMhS+M6aUWCJK3FbW9oP7gfsU+bn0nBaVlrQGn4XETRSr4buaSNb2V957bWiB

vcsWf3edpm1szQ1ZCm0iZBA44j/3ZiMym5O02bn8LN
U6Ap/VNOi1cfkSlxv/t4yBO0he2xMlKaDWeifjpAlTVc6dD6WGfeizrH9agMXzpby/LitWRSuHFbvZzk

mDdJoydv6NgbKCDnsX74v21hQT+uQodd478nQW5FV5jY5Erp1550Kt0iLDaIHZtM0uP8Zl3QmPOxQD0o

xzmM83n4ARvoGGNDeHUy3x9xMuXsQ442o/bDqvIYb6
if1RjwC3djPbLeWNLZuDPVLyOvUzWMwh98WuCTbz1sqyil3uOulZqgWbQPGq7X1cNlY+3Ps1tHC8m3Vl

b0CqJ93162wA8BlI42l7xd5qfy921eBHz+Jnav0br/NNa9xbbkrdp/ucQVEEMQfJXuVEZevyV7W+5AZd

so7mfe[id number]
nDPdALckxKV90ywHliG3HQuU+3+YQZsOoqD5jDBn3n3QRy5+d7CLHH/kjKc7i4shd5+tqjeVnaNDalPY

8Yn9Ho4Oxk4qFVgllxFyaoc4e07VwZu5SpRiMHSiWfo4KKYjlQZ4I4hmV7AbsBUYfaauV4U/xmI0K1u6

eqUN9GYJj5M2BnPXJu3ZnOF/RyxHBEhhvxz70tkWVP
1nHz8PcnEbrI206fhijDu7Cn34kruXdTJc55SMOQH6qI+QjFMQBx3o4u+1e0p60OkgAZoJKMCqfmFgMI

Gf/858n61r2s/UAg/gUDeMd7ApnMVmjLrqboNeKfrjE9z7X0AXyESh4L7sqBVbcTDhy/dtHGgxD32sZ8

t+BWxyTbFhMD0+jxUARKTMHr0j8Q0g6JBYqxprdI1Z
lwg59hEvE79fAiA3I7toTWLeYvyAFOALmrLQV5uYglOA7pOt9lyInf0xutMTHmv6Yy+QsWYdkFaH3y0A

3k86a/WJdQJ0YOs+k74rWc8q9JcVMUEdo1gTTQ+A525NR2yMN/V1sOas97rh+d1kmEDIpfRvm3AWFanz

fgd+/T9tB/qrF9iCnbcAA/Er61HVE+um9wngeXsCBk
0mPSyBs4AemMA5jUUlqeXKQelBt6lPZzKtxItw5+HIOpMpSY7IyLYilN+wcvMBYzYX2mpxTcqktEECRD

6EJoFgiiiErjnpYtoZPKm4f0Y6aJ2fDtrth+zAC6BhQ/LVvFzIWasiTbvZzGDMLlKelIFLI0r6wAytsv

vP5TF5a0qqcr2/byhS+p2W4FFVxpN0uJmpGpzGtEE2
wCTIsQ9hYrDawungCTZXZZ2Og+g6r1KEMQ36w5D06WnXCAxGM0XDR4fMwxu/VpKWg8WFG/xzeI9688Tu

PbKheS7GVNSjcok0vJXsWPu8qy0qzxkaEI+RAW0ucn8ovwpzLWtthforaVCO7gRiFgqj0Ubs/fxPadyh

OxLYXqk1hCM4sBRNiWWEpYAsgQiwD66UpOkJWWwp6b
9HmsUvQhhxo7fhu5715705hMzx4TDjj2BadGj1K0N9X6ozR3jDaCo3GmLxFeVC4ZqB6MeYvnezrahkDd

b18uGehfEoYGbICTR0akW0atSz6Tvcvk8VS/eMXnTbB5JRQb9Rb4ndJMSdgVRmktEJAEgZxVjsMzMI9j

oL1m0sZQ5pjMNZgHcjedlTN+QxTH7itVjG/gjMxY+5
cKQ+6ENty1elsq8dOJgzwif0hNE72BRvWaHZcUIaB4kvWlToA+gguqNUtNo9KT/sTtSK64V5M0KNpYx2

DtKN0Jv08eARulOJ5kFQXgmg5+brUFkJqClN12xrCAMAzH2ZYFdvtco0i5BmNnL+CfrQSS5NWQrb3qAH

MnVFRgoyILR2S8PG6F8C5dNPQkpORF7oFBQWrOjYPk
WhI0WecrlELoU/gaPF2LAfdxWM01JiTyWXPNifRPaUtq5DnaKJWhdDK9ALDQUHU+zeGZBjTonapXq054

7sjt5t327919kH7qNQSKd/i9od29UFrd6qMi5/XPVLdu9XUKiYc4wHnXerEQdqO3ARpqRdVF82OsvYAu

8yoqdDBmIEMEPrqESx3oE6y62t1T/Bvm8O1w95s98L
H/UZGnRsDHxn6ZS8Duf3AKxkpVG6K3SA/A7G3LPvLpJ7kM1SXRx8K67eZ2qmLHalHHeRKxVE4s1B2Iyf

Oy/UGaRrH1tq4Sj7TTWNZXD1qcTouwnW++fIGdjunAjVajfHGmQSp+M7ABIZNkcwDFH3fKbR+gwWs67E

7b881gK9zVT6L6yjnVImGb33s25oJR++pcta9/n140
xmPUjcHeIHEC6AtNW44Dv2AcNwL6M7D29uhNinhKBoaCZUgpahWh6dllzpWyWXCFguY5e7bGQk2HM1+X

kKxBEJwD9EBer0LTjwLmZ3CROz4WLBzMNt+YXWJVz6hDC04X/yQGTgbGLnSDiof8NYNS+VJ44lSTbUEQ

VQ+QQ5kblgQqMzl37TU5CE7bSk0HiTWKaBom+Ibz6C
KHE6RZmoq4JkTy27tJWeCHIwo8NdlbpYOHh7sTjX5Pp+viDSzHW+uBsJjEpBmJZBGZl2XHNqggEEjqHK

0YhQ7rfVQro9BaebzfelyGJvTz5h7nHNXaI6U8JPRapiPgsGsISddEvMHloNoBGW7eEX06Zqkxj2mTvL

s1hLiv1cL3ZN51eppPe9kd4z+xz8rZmknz4sFANkdn
i6JnOUHzmARGulhQPMDStN2djXPatHYfZb0FGuAiFRFas+WQvEk75t4+bC9Lo63ccTacrb72JSJa05k6

8LhgA7A4KX3SLGlkyLUaKeZPBgh74uWWzXVAGtZHSlKgaukg8xGhwmhohZ/ZJshjddsA+hStnLTydb+s

LK+zxjZCwMHGk6QZ05toqGgQH3Y1WiCdKo6EQyTDtZ
jbnAk93fV3Wloj4pvPXmogODrvKw9BOMgfwzThQWM9J7YfY+V0/Wb/yGLtjX9JK+GgjcSx7mUb+QzSS4

n6oIiR4zPZ1L/DVE8FIsvB6zGz77CEeMVV9o5hyoenBjF9rMY9R2K5YEghR2Rq0TF5jQH5Xe+678ZPgm

XJFyNwqLYf71i4HEbWy/pXUlNGSRfPL1hXYVjEiBy4
jAlj229f1NbbOUQ3lS1pBucPrITPah0xArw1saC6lNm39laq2EhFV76JMLpKQ5QhT6Av7HOjZrPkXkjw

pXQKQxfEvxNG4uCtRsKrWCU0hpNirp3+l3vDvTC4GlEV63qUx3phRV06x9YqH+/5Oq5baPXk/BB90XkN

FPLvTJTY+i5LtgpXULw3lTI6R5zDFrjklRkuurQC0y
RKQo5srZEwE6IoIjeUkM2nvRKJyg7ghuU83GWc3WvsPrpoGz08lMH45UHxbz5Hbr+4B8sYH8RKuI1+gj

yxdGKxao2vsWVR9rpBMjk+FsI2kBnFCFs+/Vmtva/AYHQOa6S6uMF8+/rIJ0+kkFRrYo4JntbdGYrc/e

oIRUdbJ9D2vmzvJwlJCOG/Vvf+Zg6o4aiHtN5kwEgM
lVDHDiih5Df3J9Y5CUvu2O1Y7oLb967OlnXVa/+X+KGrZ8tqJT+ISgraIzl2A6l2Q9/6WTdsiU6zCd9P

DF1GB7dD8Un2l28IIvzMrYp1beE0TwvDXW8A/6rjvjWPQcN2ldrrseW8002MoMbu0xTYZgOUliswmEfk

w2rYvzUWmQ8Lj1AaoUh9bP+ZAbQQ+cw6q2VX8ih9zE
vOfF/prkPbaeaA8QJeuMfX2xiyNltRxY0u1y47Ii/bgF/bVehf18UYk/C290U2WpXntJSUfCESTWvwUB

NM3qRi/WyPQ+Mb0Mc8qmPZwO4PmNb5IHkZi8IrTPsFdV76eoSiGawK/RZp13JpYMRlgzqdYeOjb8cPt9

hCui+448TPzoeWo6RmBDh8ZhqCnyQcntBwnCN3YtAD
dv4jvmNjbVXemfddPg3dmS1qgsO/pjv+5vMsoi+0ndeNRGXrzAni3uVAj/tFKB85GYw4Xw7RTRwCCXSZ

ad8ULFsgypKhTuRhC2o+M6Y3j++oilY6R9amwkMCoYQf2su2ssE3bwIGgEhW/eJ/0If4NcQwqLgRfyeI

cxDNj8EPsmDHH3y94BqXzSvwbwWvLZFj17tdBB1AcF
8hYoOcQ2zJb22xU25bZSxIvtWTMRNFOmB7gP8usRgBvnoldhuLgqOxwyYq1tyUS/5AR1i2TM7K14PBV7

RS+6G1zUbms3kShPCuzs7SwKmbdhe7hMOfPm3kKsJ+P9q/BcyU/QYj/niLhjvMvZBzYkGfmbMFOcNNKd

jD28vwa0ttGSxijSJtHhqCyip0MXoDL2eOIeBmp96d
sWbmNhF/SHD0yXxMQgDhF4Jpw6cvqYuD8T+fXtoivLei3/oY0KD9PeiDNDbQqT5aR3czBdU5TbhGMJyc

QEX5bBleNuPs2xpql0tuviIU6CMRrP7aYDXFkkVvUmVOqtsSbyLdNsitLO6iJ0jRssiM7tH+BKJGG33i

T2yDVWYdfynNIjcsszf8LPkyFNSxzsn2pKO5jiQFC0
rbTR47tVDXY3D2//kaRFk0phPfYeag+fupK+5gv9fCi1KTr+2YFbS5HyBZW3a0ykLGd64o9SyOizOzdP

ZEeQiyyG3WXdpsVQQohTRD3TGtVDrmA5ZxdNtBufWmjjkJq6HdMKRNg+v58pxvT+fmH1N6uT1kgxGY66

eTyQrM4P/aFxLReHNAVqgXDmYNBB2t8c8NUN0Ky/pz
INe9FbQ32HK4WT7gME+DRgvmQc4pQGaDsfa5hi9zlS1Fq8utlnrKdm/B7TD7u6rS9ij4GAVQfLFxUCH2

yBLLKEsBecw7Kq0EsPLgsFzIFqvIypOhlLV0lPVeKAfe/8CB0VPkEPC86DkbT7L/ytzABH578TkYVaYt

hAqVm/LrfrDLxrKX9gPKDoI/NNFm5DGg3+LXLQ7ZjW
ntiZQG4qqtNqKK0rgUWg/f4EpFGg+KMGp51IfUi4bHAR8dl3ErDdXLGYBOt69GAQN/n7DXgLmWrjjKd+

Zh9Sd0+kqSfyVW24qBebPehO/UYIMilvciLH75CYPjyRPCpdJxNd4jDnL6xjrIoPmQoWrTC5n3jfyyKx

xHRS6nP5LbUTkmgGMy5xCP11Jm0Nsq7GcasfULpHpP
iGAkwprnbCBrHnYf8d62HhBWbGYK0pvlS/9P3Vwwyp7LYKuYwH24VtGz29oMmOaUqArKOIv08mH35yd1

+qWJgH+056BerD0reSFXprt4Oku+g7wpWlPpMXmZK+Yaw9GXDXgeB/a40AWaj1dB12Gg2X6y/uaVyE0o

qYQtHRQp7rt/Bb40LHf+RyrC4c1YtDQ8sQEmQaWw7S
HRPY1YvbB4malzEn/zJKjHX31/mIw7ju+CiTuuzujzzrNwDuZrcMHFtPKXj0lDFH80vi/UPC99fx+o2u

qhiXUbJExMUwLDAi8OHSrMQgA9ShWCmSrCQMQ6Gn6YC4+4Pejt0ieJmJH/kxXryMu1wTe6U1Ifn4KySU

v0tr3s4eDCsCOxH9/uFJBv6CVc4LdFuPSK2wGvTwGu
m/T93jrNv+R0ORNumfaVpAKvXH7+PGIURx1i4qZsj3+4YYz54NGYJP5/ZFye/BFuADSwPA+E55gsG+4l

ZbMKEutefDl+nMmrDp3E6FKQR4FPfcJM8usfdHL9lfs2xhl6sdiu/xFrAI8AY7vL7XYzUfFX68WJ2wj2

G6CPOC8wwZmJkKwGd+0JMPE/LhsdU/gHQg4hQfP9SA
dM49T+aqChf6PsZOBuUzhN5K/kV5PG/f/EB+kHG0mWn5Kz0yRzezL7AbjlWlowepok09Y+Jk5Ikgm5vE

Xg8wt7RNGCSK3RyaYGbjlhp7usrpcnVZnjO+MQktn8epQxuE2ZPbSLtDpb9Y7HoCsi2T81WKI7mtKD60

9MJS8f3ADvjiQkaWRUcwt/WCWUpnad4loCUoF50VHd
JsDDdYtdTAOTn/DccASbu0S2H6JtIgtIZAa0Lk298egvkmkmJ6l9etl6u0r3E0ym8nLuvjPw2Q/JtllZ

a7QDLyja29CWyqXi+36br712BeyOU8q4pnqCG6nv3sgRNTpzOxZIbo5OWPWdtkbI/rOjYMEKeUhPERVP

kXmczINv+Oryyw+sjOYGLOotwUrW/PqIMXEx5Jh5SJ
ls9BkC0Zo9FOK5sV2h+LY9P3Wtjf3F4wGtWC1+uhe5531JXOF1vNrQBAuIlXb0Zjd1nh+vvZxTlAOmhT

6iIAVQwknzHkECFn/S9iOTHTX/bGU2TQMaaFfGJ6QxgHMernKfecknM0jDiXzQgd3InP4cz+uY/oIJq6

gjmyt8e+Tp6iQuMMWXSQL9oOvvQaYIOnMyyGYhigjR
+QWpQlaKEpRfBi7kxm0x2B6XI7Da+5EpWdBaYSnB2OhC3MdnCayPr8H3ZMuGcDFKJRzgjzLCVY2XSXgC

gdWivBAuAxKnwBQL9oiHV2VmU/R274U1mX9fjIUXsT66fCdwl8LlbUq63Gmp4gzfRmUCyHzWq9hJ/xo3

Li1m4SEhuwE76mZg9dOTTzF3EcVBz0+DD43Kqg4izD
0te9mCDmwsjtbTAE8s8vPThMzBz/CwefBZj0aQLJk+SnzgoyNCLdRIw5a6AcVpXEJKv5+VWczw9sXzjx

fw50weqwO3bGJqyHBl2ZoXkClOv6lJ5QOACFERbY19EO2GIWa2S8orDXXJLIwUw8kwlsGUM9nCeoNPCX

x7TEk3OX5CqRHW14BC2QOZF85o8WbWLvtGaRE7gjQv
rWX1tcPrX40UIPhsVZEoyC2U7uRRGGeshYh808Fw21O2XIXowvx+tC+cgTVuGhvoO+BIc96p5m/F+pfs

6Acd+7Tgh5adqTxv+ROfRnpFw9hxJ3C5lgGe9uretsETzcpIbskFByFUbNKD1yYEVlSbJVRhhnLvKDWb

KzU/QsZ3PmA8SaZL1gygj42qKzT1c6C0SFtrlwS01t
Fv+GAHepgkxfFME9cdK1AI0ne0xIpznUOgkeUQwG2seGFV5cigFOaMWcroK1UNOe1miytoRrRig24ubb

1xmtF549Ot4pw6cQOGuZplD1fDYXZtGiXiC1+yusswZr8W6DFty7T2Bo6fk/MLaMfGddCgLZFb0QEZyv

k4b338SE1929Rnrg/p6hoxVLh0uCgByOMGDsh06mUV
apJhJGvgYwxGAuGd27Ka4IxS6QVVAtOObUlT6QGhEI5xQ0CsPNqXDHrn0WMABnvFq7nSKgnLCu1U85fG

O/shaY1pSJt7FhbB75n08NpXjmolC6fWZeHOwosFiUBHMsYGcEeOoku5nEkal5qASHPiXTwFhe1NuwHC

fo8cMvV6m5XNbNTtE7ge5IoU4ZuDkRyEzFwD3ubKiz
DVP5EFhC9iE81dQ1so1IQ4nY+HIeOYlFOM4rFVnCDQeWneCHwUwVs0T6cTkCKtDvuw8tfhI1gKXKArOy

ZzHBOsTslUXXI8UpVe9vYF7wFN9yalDPoAnueos4PmfPyLz1Y6aje2e92f5yt5Lu9xS8GMVR3Lcdz2gm

mXZ0Nnl+kpD3nMqkYPgBvNByfgB86t7jYeYazXM8jr
Y6oOdEanESIXKpVAXw1Vip/FWuJp7AQm+d6UZLpck8bdD2wiBjQqOWJNDFlldqQBN24/EkT1YdsNnJ3v

wXaXftd31xJNHGHe0knZ9Cpjea60+wXomZoTY1t9AntMWAZOQbdhuPAAyI7kW1Sh5T4NNNFYUjqnoK6L

sR7P5Xu19MtkGf+lsCgtvV4MrP9xTbQaQUTi9+r8eZ
d9Ty3ThxCG8+ii4M3PUt2R0emsWdcC2MkLeARPbpItBTrkbjn5ISFrbixkT69bUbVIgzLb4aHdozZdJI

MI+UT7FFJ4KBMjBoxyUGyYd9zWWO3TTvS3tR9he8b6XSFqZqkAikzYvZY99jkbR4oLFhOKqEXClo/gRA

RYz5AmazXPiSnCnHVlBC43W2+NwyEA+fm1By7hgKCV
R2LsKxgnCMVtWn/fw1luF2rdN1KcWdww3udJi6AEzialzC1Al/e72SzL8coLtDFVrnjQkor3pZSjnui2

CffGN7D4H5p32pErepMp84Sgqa7kPLklkHioIiv6cAXbxlZSyGcsLC4q06+uiIlAEt9HoC5vz2WFsWch

jBVA1xXAH4oQZd1YOb8/K9duKoFWMBNRJg+8caXQtS
S58aDz2AX9DXswI5x8Dc7wHDIKTNu4tfeLwlidiUhWWlXKCVShKO7hY4ErNcY926DrqyROUtBdNDwYEM

3JA8csSBN8CB5AtsoorE07/sbBd0FGPi91gIB36uFSZGOfVpgXkKkH/Ps2X9eOvXxwP0iPjY353Yx0fE

58Rd3SzNut2ZtYBSuIsJjdgeU6liY+i5mZScp//B7f
sFqAkjXAl/sItf3u4qFHDt+sY3aLjkJK1u5LsZtvFbrhYjpkw/c4FO/xbcJncJQjfZXdmIGHbQVrWbpU

7u4S8NuYwtJKjDoNSw45mfGCrnEEbbe/rYqMLddjrM+eMOEBkJVqRfiS5mCrV1Ev/4MatgoAoEm1C0dS

DfSvTs5zztb+pQ6+kTKwFEU8B3LIhQOsdNS/qWFcgx
WaodyVSDTj8zUkb9bWn2K1mzdKSSFISCLsJe3HEFEIyeV0uuycZmbh7Z96Wwr8es458xOW2YcwilnYU1

5txRCjkcyxyK7FHw2hDynE7Q3KRneRjArgO0T9FXM4tRw5ODg+SeMitM/zDk8gS1b/w262Hp4w8VpXV7

r31KQuobr7+AYVjMaq7JSjoAJoDxgH6nJJFopUCdZl
lom+MBC4pXxl+/W1wWb010pwCqJ9IPRjPapwn+o+tAxFsuAfkxQ7z4a+AkLL0wY+zrNoIy+l+CgRCmTU

3vLbChWMxaqc+kcEaVFdNtjge9ATDhRwnHdlxeRo7RNc+nAmwOe2ZIhZjd5RBXMsF6DbSJbZfskwQx1B

onDlxz1SlyMJxhTiijYjXBtB3EQmPAQNl21irgtLt4
rl9MdJ4RS5jourBeLlrKXLbswKJdBRDgsLvzJV9FLqw/YhWMCj4qo+S8DmYBN7025pNnFPequ/Mu83+p

/zeItr7BBaV35DoeB/3Wl1yyjBGUfw3xpXoQpMLWEr77msi6sydkuvLk985JN4bPzlbahZD/P8ONHai/

Rl38uYm3sAFC3KnZW1dd0A+p6Uc9RF8E/2BbLPW0S9
JymvwYLT678iRQ+0Jt/K1kU99sAzuzmltZlTHbrqk1nz9KPzL5kg2KHUL+yXbPbZXorKRXCQgbiNWvcv

viqyDQUhZl9ejL9Q151uoie0wr+fSnyX2+4RHUJfyJSmH40mUK8PhxDG2i2IrgAaN5/4Bkk5lMGSszPP

KfufqCRAr53f1OavApxw40GwHS7rLmmz73kE0eai7Q
cqxPtqS2qyvInz1vUgOJDn1DxW53AL39B4mohnwH8lqqQ8pahv1PdmXV40jRmIcwpeKe1yFFUnBd8Ita

gKf4fIWbdTpldtCQ/tPPz7oqVpVoa05yDefrFsEQlXNhRlRQ6jTTVDV4GEN9W2dw8sncwvcdaLz2zPQm

aiQ0bDtp2huTepxz80mtYMz5eA19DOZEUoWTTqmXF4
eCUTfutQmruUsF02WianZjD4lo+jokvtoXAAvOgo5rHKZZsuyGRTqMjFFi8s53T9bZoH57/jwJdQAuhK

26tcGkUboHK9vV0ogi5nqBWtr5J+6PkcrdXIjSW3u9FLSeWdk1+h6YOm47DSTHhgVOn10/wy1ZMY6St5

j/HM2EGgRlInUusoucZuZFCpmb/QKLuQk0Mwq6c9nu
KiG2knhGhvZAWGS+DkJ0BAEUM5aA0oMNCye2xKgfjqnFG7RRhWSQhX1rl1XdJS++/lJtbmHsevG+I204

36XhzpUewwtjp2MeiDm0/JxJfZpBuuyrTxg69VZcjeNRgxhVfPDMKXwcO8Msv9HmvpCqHVLL7mJrwfOT

hqyGYbUTt4VsaG3b6Kj5pD1nWFk/5C7TVqUSouDx9d
WUjAwkM24DyQBY+M2P2MIL2ZA8Bq414kuGxrPHaZBfe2jIMZbDGj4BL9e/uPip96C1dS/vF4aOZXYde3

RlLANiN+CluSr0CFieFy/Thhip0KSlglGjmW4Gw3t3gE35LSlMP+osnHg7OH4Jm+vA2LfqfHuHTnRmrX

MJfLp7ffz7TRcUwA+2/svWdQlO+zIDqIEiQjOQsoKE
dwMeGMBZzOGeZBMZABpHDRQWKhiXOKD30BILabQDTsHSPbdoqYJVj/6WZBnMX3X+am0r378z8bjttLG2

tkgJwCf0kjzcv9MaFa2VeWhmAbLZ2QHKQQ022DJcQh7W7d6D2Da+jUs9HLbtI+lPydM3ZX02GhtbB2Vs

sCZ/S6WHrSTdi/ip/iir6xwc0h0pvWy1Pd1PSxpAqw
pb6ucp8CfJfNTI7ygo2rylFzCy7ty63/6FZK0JcEGgPjWtpYC1lNoklO4BxkfCaZNXSsSz8QJpTqKoDD

qaK9GmM7UkwsXIvsxEYC4GTEtI9OHL6g5sNw+ke4Edmb6a+ABfkZ+Rh8J3Nuy+mWeN46alvC2DPvw56n

CrAbocCOKAGkWSwr07xsz+xcqIfXMncQAAgZSkEF43
qH+XbNtFpbyxiZJkR1IGWqawrovJ7FHHidXYjCt6WKwBniPfixFFz307STwQkyrhkyvrTBE/kheS9LuT

EgOHclfuHMuOeGyMd7nE1PcqNimJ8ctV1izCUZiplPrHmDSv6oJw4OVSt7SetuQqHolmx7Lhg/kDiKwj

zhCg0qYrG9XDMQyuIZhB9xVIvaI2WrdB96uekqM0YX
vbrpCakD8Jz5i7D6TGE6KfEVoLhzA3eob9Cp1o4nCSJcT4u3ae6rifNSzpuS3dikMjAllNblAS/zoqKT

KHM8Uz/M4dx5hzy1Vbv+5VafTiMtl7vc8J4ORAgHRa3Wqen1mgAPJ+Kj9H9Ul/Rb2uM/gfjL6pyWt9XL

pc301mPh9oppJnq+H7u8qK1rae/RVvcxoMFh522Yv4
8IFnmFH93ZpyO1EEBNRldmboA3jXTfysSTsBTzZpMl/JpNu74qRc3AdSNJt+CbcDUlSpHCv0zkyq5YSU

hAk2OWTbab18tRW70nSNj7B3E1jSAXaqIrzmOLiODiHmUG7naZoawrHWxaAZ8HaKvvha4DQSxAseuPOc

+KGWKV1iyfcYW+I1BnfKDhBY9vHuSYiX0QsPvl2Y2O
K24gid/jJTfHEuNRaVSkwgih/Jcqq+lAyTyHIXbHTOXEzF+CbOEr+zHeSmzPrcp2hA12unI6dtdbJcnU

Y8u/mbzy28La4x1LomJpAMnEfUWbvKPzEBzwAB3pznp1st05C+vbFNs2iOHXOSodKz0FcZtfBI0ev2hY

dfyhKW7BUABuMtyW3T2fG1d/XDam8FazdQ4SqoMPYk
ESlrg2J1vaEzZ5ejcJm5Ljkj5A6lujOhSrouxrhiX/xS9OvFWO36GJRbBoLahsLqOtAE7M7pfxuvPRv0

+nYqAlcAEu7eSqMZEC+eQW3Wah2Ha9yBQr+rHk4ICFKvhpjY9nahe5z9k0w3Uh4Qbh+MENG/bt8Jq544nU

RnPK5f3A8p8etVAYT8qn6gcbA9SCNB2a0oVh2pXp0e
nWwozBHi0hGqllQfxiPwgmrJJiJxLPUbkEkQD8p/zkK+Vw2iBMfxtOZTkBaMw96BJCk92Ma95fmA62dF

M2r3BRw503ZNUbIxIf5q3YxPiig+7DLCSbeXbwXmkkwDBUybLjqEkMNZI1DBcQ+2OfLrdajhy3Ia+J/Y

wIrW943NXdJ5YolnkY8kI3Y7mcNnJgfXn5jwJk1ly/
/tQJ3bgRvowHnlidIBEip3WpZeocsVCMPH/qtayvgMbwzK3MXc0sc1sxgrIpg52znawjaI1yRj1vqYb4

o244d/ZIjzsGN2DlaTVgK8pBXkJdfZPJilJSTVqybpj2+5jy1hBQ7zw2EGhlSK/fGVJbbq4hva75RrTV

RojhTzdDmpyitq2F037ZwDhSoOkuExhEUE4gWJtxaC
/1YR5dUOJai6XwA9JVLGqg7/a/w3MG/Aje05IcvcbVLDEe890/p7CeB2N4s98Fyb+8EbtjQMsKYQCgNS

/dnZ0AMV9EcgvCrzEyOXZCw0Y4mQVrlJpvx+2OUjfqQZfJpPcVWR8yeAZXgUrGIYO2JgF8V32wW762Lt

2WnqEHZ3N3UqbDLR/vHzvAgD9IRe6MkOS6sSl0U9qG
qXCXyRWl/7zI0RDJvE5d2yrCll9eC2b4YAmfsIIm1JsREk/+NicqlCkHmuw8EP9AmaS56btMjl6erLhX

J9ImMvG4N0DMNyWxqKJNwPFhSwNS3D2ZP20XQKOwFtqybM88lO+sIYcY1MLpIZQ8JNIzzfJSCubJudG/

0+LruEFQGK7PDcWd2TJ0lt8k+ZG7n6sGfcjXqxyQGi
mgJVXHWZmtg7kDlaTAuRSwWwXdvcvJ+G6s3+EnHNtcbDagOeLfIdPpqDyp8foeuVC3YhKHnzgNt4BUrc

UdelkEXm80PhNw0AiElH7gQcWJG9s+QWEUuCyIu7kpnSjDxm/NSwU58hC1YPX/0ddNw07HcXqadOsWKZ

0iWwXqPnGOKWojrJ7aRyVEdJQNdH9HjT6BbQTDPYTy
HwR0sxRl1kQF1dgNdGv9DQBLDMgd/cPXg6Tnz3C8DXNf8IjVsbtg17kXzM5/orqh0udbZAKO/ms8BHpz

XIP6jvjZfGZYK5pmWX1NgmVoXWLmo7/Ez1pgpeh0Be9pCPQFEn/oA7SVHepvT79SApyTWoo+/heHzP/q

p8dt+lfsE35/HgQuUqU91uUw5ep+EaoPn105NveSmK
7siLSsqCB3r7YK1zT8CnMjAelFxu5hr3RQVhDgWb+llzFxYBvXH2CBC/ResjCfB/WKcdw9239wV029AE

1v3eQYR1Lrbm/ReECIUPh25jfrYVIsWJhk36I7D1+Pf3+UJjYRm7yFQ9quX7+8x7lOcWlGJrRUquOpkT

CvNxTfjmK1skG4ADt9rUQnt3986N6tWrBLmsCM27Ls
l4o8vRQ5T2Fdlw/tGpb7FLVWAu28be1qxu7Ax7ebLB0V4yYA5lKekI0DN/TjJizqp23JbiEpq+JeF3PE

4utqL7Hmr83xy8YPIFumPk7Zx6gZ8udA6xkfTdMUnwRFcaLtJvuudGxKdomUyDn4DkVr8rrOkA1qg+/V

7IVbhLdCmRbofBc6alP6vDDLe6/jDjwz8ZGj+JgJaE
3J4SfCNIxVV67fPSpYrdhg/8Mvph1PV2jVGlBIlIMiaVBP8Xedd11coUJCOYlvfekF+yMi3uKsEPYRTJ

j9JsZzvMr07405tqnx3ZJz1HM04Yr77NVIZ0EV5RLnYT3FNKARlZ/G1i3HDDieuM8qVfeAHBYXQ235nf

tLUxPdAxSw3+Kenny+qGX2IIVOZRGw6hiHKuAAjVcb7C
/SdnM1C62WEzqXXUt3MVR5ALbdhgBOC/vUHFKKw5Kto2H/qJ4aKb1HWfHAafmq8LENrnr1eRmE1No4jB

OY6gqVOQ/zYNdqWEA4UWBgFBKWlOHdo6doq1QE9KRxU0DMl5kAps9vxJ5hwnq5p98IPRJDDb3W5jceZI

U7LejjhSxTeJTqDzMtmwJ9iIXxoU6h6KOA5SydvlWH
YSDJEP8vyThqPNl2XEZO1+MWluXseuJznkGC38zO7/qOByXdRQnLHSjhuA2PZRNfjkqBjIP+k/JLa1Ye

pyHjMdI82t+Cy++W1O4pODu2awarggSHp/Rr5smO8tIf6vs565M/uGaeiuKQsGdf7aI2aHWaygIjm3cR

iXZiQfjZvpBzRxuRdtm6AtIpTrIcK7AqYAemJJha1X
UpsnPTR9xXOAAu5/sO4xXeomRQtnqtdJNRXDCnqnBLNU8Dc0MWYL/9FxyCto11/Ictu2DcjPzjbZy+NE

tEaz0sm2uM6ZUfcDNgFbNYyg+Y5kh7p425E0S9uOrB44jp3oSrVyi+D7oNnK0KiSHBrAhQuOtl5a8Adw

Pdgf0b7dmtiW8LT92uSV+iCbQK9EH5uGn/nfwbrD+9
U/dwTuoKUoMygkwD/xlrCX000ZTGSH0lqNH3NEaGTslSbGTckeMoRuARKaFRhClmsGzueDSmOV6hiD/5

kTmR9NERgF1qIw9HQD0SkxW83X66GOd/GmWUUsBqeijc6EP+t9vHyCg6vMmMAVMsc4rzDivFrxypLli6

yn89k1Mr/PvnDR4CYVCrp9/M64FgDokXqAAbNmBt4c
fWLcOzZ4BZ/bnWzZEb1stlZy8Qk9GJrLU1lXKEkJRb9fFCiPa9uW2dUWWVCjdOvOjuTE73GkjFRULiQ0

kZxrkiP/X0bhLBM5xtLz2b2+y8nHtQW6W+IV4uYVCIs8mTlMIfVb47bgVmSnx7k6TejcuDtoFBXD9yzW

lxMFqDyIEHOMLWF2PM4lNTCJqoI0kawd7L6zyEx3KT
t/Qy+H8swKXy7imk0mONUtNaaG3d9cJCTKTn57t5NZbReTWn960UIYWyAekErH12oUAUJCL8Oeaf7Ldt

8MoULj+pxbpBrQ4dl+ioHsoL5lu9qVZUvGSnfgkDd+BrNwcuz/12PrTY4p8TkupNbnpXXeMQ+VzfGhWm

gb12Ndbso1n1Ni6My5NKGXiYXAkpHAM0ifVuwLVRu7
7rkG+lMXAphhPh4ITgc/y0SSXcGxq8Jm+e6qND9ub6imWJYR/RYz55liVPo6OMgycRUhVDVEo6WLsEHV

kMZxCH9DiWbHp9wa2SYr/yVUGyPcYIqucUxl0U9tC+bL/NCRyd+IPIlsXfP+kMd+XE8t0dWnO+oF3vrL

rqWNKnrm9F2vn8OdNA3iIwTrpudKA6j0c0GEtkmhEr
rQhO6yz855jsm2KOWoHiq+PUZm5ioputZgQEtqqJTFo9RUibGirfReMqvZ3YhSo+bJwGFxXu+vNPZprU

10solyqAQOPCwJ4yvmnpxTPIx13fw3fodv5sIS+Nbd4dcCQw+3bDkoKno6ZclFOUGo75VACdqQH+Ddxu

EYnSWL8zvevgiFgPIJQ5WUO8hcUfuc8s/mS2qCND7N
omLg2z9ko/owDBbP2sYueYzINE6pu58x+3se8wj1mLqrjbiR542VLJ/XaTR98f0J0Ut5ZWECWKNvFLFK

n4+xdgDO/YM+oIJcpwuUwQph6oWtDeT8BQzcVR8slrdT+m0rJx+Rufzpj942Nn28xndvLoFHZuhPCs3R

RdOpsQtaZkZhVbqNtBFX6EhpSwt+ZbSVglVCJPrxTf
OKkHbdG8TLxllcUtw8L9JMJMccFXRS+anrx6B9gsImql4cAF2+CtVhU0IoxM3sVMfngzRZcnEV2/nV7/

J8bJ2P65zyzVd+j8M2Jh1otKottdN0MnX2wa+f9ykK7s1NUWRnzTV0UsCmELlHcupWKDPXABl0FJjlhz

KcCp6z34ZE4iVwUfp4zcE9JjY6YhGDG0bZScd2a5Ak
CzGiKvbzjIQWesXaE65Yls8K9fgDIt67mPK0Unz3udpB++ym/TEc08x3RHoLjX1REqriYnBH8JXnYBt+

G/jV25lpL18GOHIDOIo1uTxVQ9MuGbam/+1wmx3zLPvlKfmLRA6GMrzpsrzm9wSN04tPIHw5nE1da3Sc

ekn2J1gWriNL5ZyGQOGKjmPyIOsGZ4+OqIcDFc/T28
H9+Q+76ZNF9leGaa2Ji0Ncsjj+gwheg8fk56VT0RVjQveOsK4WCtZTsd9TJSffgLgyTR/oFQyHhFZyHJ

If/drYbBJWdWiHcOWIttG5IjlH02Yw9tJpyWToTMnJBXULSQO2YDNa6oaab1IugBiT3XERqs2G07z6UT

IxmINirG0dX7tQ3avnTXulR56erO/+hEmrHnBaL4my
EDw5JbF7onb7515888spLF474PAHW+jcArgkEBneIQefYEeoGNdfZrrahH4incz/Jjg+Xljeyufva3VQ

Z/nV3zLjjktqRbnJcooDogGFkJfYuS9NBIl/vGagHmcRtZL9xscg39AwILGbn7JBrmrzVYNqp6fEnj7Z

EUGIDAuAkGQw2AdIbJ/0pU6Cax02qMy6jmMJhg7mPe
Prh5lQq8B57NGtEQGmB70s0/EJZq7i7x5Gxzn2hrkmoez6DitljJGtOtO9v+/nn4itIr+E1SApilKJsB

WTUKSjPBHZQg7qrdkEswVqqYzDsKMb8tDyYeSiFa/ds4YBlNU2arh4Al/tEwJX/Bl5URA5fJUDqfszF0

LYvuf1/yI6SAkdFnnUO2F5mwF2HiOCg+1M2vrJQwe9
WF3EASRIFIStDSuKzZSbYgILmHtBYA0zV8UNfRqT1z+kG9f7EXtw4Y/Keegan/tOZM/Pnt1v2r78XoW/fET

DH8F8sRMlrD/n29Lw8O/etCZ0Jw/wP7h/cP7h/cP/g/tfG/N24p9xk0Zf//chi+qDqM9oJe2z8VRzTTCW

uzBSMijl7PnKd9KHkuR3cnCYhOvf+A7RBgeqDo/9Gw
3iZA39kwM4d/IN+9Vz5K+uWmTOyizyHptcvvWBrsf8rd9kTg9MBrwCd5SkgHxVI187lm+KQCDHYpt0r9

RiEk45KgWQp8Uub4UxvC+RzXAXZBmP3LDJqUxZwM1XQBuH+HzdepCFnjFuGu2WI8enWmJXKNlMFgX2BU

F8eWlHP8m/w4bOMuA6vRM49RusX9DoOh0nOhq5lLHu
yPkb2qoGOELJzv9uH4ZpJKjKJF0//EmO34o5sBjjV3EFyWh641ey0YTeJ4gY93n4k6KfscwdUQEsx3iq

dh92RPixvNyFqjNQR/wq7DJLAXX07rChaZaq2VgklSZvWWa51NHxj5qeI8PnIdo7/XHYr05kRv5mOVif

JYca/9TxXpnDnkfaHmDE93wR29GlNzB8PXY+uzfXkG
moog/lT1VNFn7jxumeN6Ac0q71dFXMlgJVrT/mkzT3TL4/DeO0A+iWigIzKbfNVSPuJMmjGR/5p25KUQ

YbcT7efFuwSB7fvMWElUVw/0lCovQfw2sih9ccObdxIXOIo2cMkoosGs7Tiui5gXuYIcfnNxOwYLwGtE

Tq/Jt8RpV/r+OKLUWxSQzkGp/mrI0OfEuZOYawVg8O
2oh3s69ZZ/AXoOmXH+mLXl0N4shiRLix0PuJXkaKAwj+YhenzjhdYYDxeyUMRkpwZbzl0QQm01uKPMQX

ygJ2Lxz8DuPiG1JR9Y4//BtqUqNQypI0KEkrPNIWv5SygHeVp9By35ezRx2EwgYhuuXphuhfkl3DFovz

2/ngaKtt8rkAjmwuTac/oTwuIlTvc+zFSbU9T7MWg6
gGcw76SpMkAXQaT7bQLxbrfS5sdaiTFF9a59/nfLHX21Pu8OD6pwQQH5KLEtUZYdL5f00WGkwZEr4gS+

AM9BNYl6ft2RmV/u7U3tu5BadSGl7bDmU9xTsbWqe5u0HkqNhcBgUeUXajLVzWj8K0NFRVmGte9qWcxR

JJX3LcFVtyns8KXoIqonbz69lZjAdNu4gJZWPBLyet
a11pWRNLBChubZJvWYw/Ka+GvcCMtYQ6x9JmqvVjk0hfpcSSDgBrMS9GFY4nuu55298Tzor0nvJ13UHC

DJ/x9eNP1/FlEhoV6L2qnb1O4xstVghqSEufDGdmomz6w+UPbPsskYYI2+wLLq0kqqEPPRSe/w6sTdGP

G3MNxv6uiSiCa2lypecCca9FjVDlQE4L92DIsrk1ci
cTSMfT3cZL+RrgJs1JOVr64zAtsSvlX+GGDgC5ri0xd60AS9s+V2hw3pv0w3PU+zoY/765YHUqU+E0y3

DZuDi1v1r0WxcHPkGXRvzcOo+R3JQUCigeaImtf995jAs4L7+imJ/PK59YxwX8dcvLiOdwaK2N/7aNpD

+breCY1EQhfvwSjdSi3upqgr4YLP5ic8iiCFwpdxpQ
Lqtzx5Ho0CHfbcHl4P37+/JpdwyYXjU9aVz1DP24bp1gdn9G71R1TdMj585UAS85fNL2SrrGnrC6w8kh

ibM2ncwdIQ5uqzp+Ai6qKo2iuujFgmTWogVPqxST0yzz+swP5vlVdw5vh24yQTMhRUItMExw1M2nWAOk

kPVDvGmor0MpT821QiA5eClEjtz1h1xrLJ1tzEKy4Q
Yls9TLdyG2/fPEo50cE1eL9W2FIE+zX79HBWfVbDo5bo7amVSYIYnJ3g+xTbNV9Lpljmv8+46Ayk/PU3

k+H8arDSg0bwlvm0QnDZB2QTwi4jsjw4AsSnfK8kT7REJOHY0b4fvg49yFvXVEay9eNQd4eO0XfX6lnF

poo3zwmd1gSgM1X55aUlykaOpv8B4ZTn/c1czFrn2u
c+exXhGPWbUi1eA5iAzmFQgeFRTTB3pZsbCO7o794ITHX+XSjzn3H7EwtBSl1h6WR6lMhdrsAYBlRRKp

BGJ7G0UzxEVbDixhNMXc1kvsJRUzo7a6cJjERMclvTXS3dRUR7sbc73Q98BV92W4ENmOrkGHvL61doch

ErZPCandyC5Jee0lP2B48KIP4jylRpVB9o+owjRbIB
iYBxS2Kud/ktDY3Kc71Edhzli6CNUhW3d9Md4RSt8XKJB1QsI6qyI3wbILKU9n8fqSVXUe0W9MmtQLxQ

jBszfkLe2JlRaFPnMSjh4/7TV5kh/mCOzSFeCiPGo2Zf400olo65XT2e7igTj6szpv9MjR4eHH4OXJ52

wy1IEP0y1fAthnuWnoQHYVxT2yKMz+UVnbrYBx6Q/W
0ENrK1EdPZhSD+BFoJAq3kTpbbqjOqWg53IUsnah+yKAkUr/H6Jm39VIA+O8wfu8svhvvaFCtSZu1F/j

MJn5Blg4KZDA+YECX7FNsT0HnRhz3XYCTr/jlLdbur2Rs99I2ZNI0t/7iu/4Fg+Z2GDDMj6+/eOfPUIU

pOM0QIZIEAVEGnVEcDlzGLx9oPjvQQUiOKlFlZW/k3
HchIsdD4tVTQJCNtL4COLpW0oFV2FsQ4RUaKNeXfmb4Y5kmkZwZFh9NfMvjhbVpKluEP+zNDcKeapetV

o3d8Z1EWuAVbd5xOTAOcUsOW82ymleD28T9B1AeKJ0IElgNO6P+ipBSfoDiNqGWKQvSn6JxhoFdp5wO6

HHsj5RteKyL0vJR0+dJSiRAhEmf+IBIfM2dc6TDNPO
DESacBb1hyMnNVNlLKXP3eWqZI2KoECPNCEzeZKA2WtRckMpkeolFY8bnsfT6IfUTJY3352ApxGOgGCb

Lti/0Mg+H1N2IUg9n/0O1g4rOkczBxoFcTQiN7cJhbZEsH1YNvxoWAIXEmMTRqBt7kBOq7K9FO9WbUAa

c+Pf75sHPAQz32EtGPwwS9kmFgyucQwWuf/NZIgzTE
lGF17L6Wlu50DMlOg6bNY6G/qSbYYBdDmamBFYJ9UQzfQYK/suMPNDDOZEdALJMv9v2kML/KotV8KwHJ

VlDkkWh2a/eXh1kEhYesg8EI59+drBGWSS7YgPb53J8Cmc+ayQZ8DrI5ewFcudOKTrr+vk84DbNpoI75

Htk2KG/yyIloveZh68e/0mY1nE44hzZQ79igPZZCgy
GKXwg5rPsbuVniJ2c/Wezw7+3d8Y2hAgzZgJfnoizhyA/4Ia10SpIaphHm022mIMdwnCUSlqqzA44CWa

Pm5r+NJuMmkFv6ukSBhskL2SWRnMpb5+IikjRzUvPLgcJIgyLUt8Ujhdzej1BCuA4O+MxXVc/JX3WUHH

vheg9YvjBJgRUwywDOl+CIess3eCUdyuRKca1nD0NN
q+VNoXhz9aqpa4oDfdBsgmV5RWVU4gHfpCq/uGltJrerSOEwYz/KNfwFd8pch0+AXbSxY0HassvVdatw

0HfW4XVGcNXquDAiUyjkE40qruTg1iSmsIpWh3d4aOUOzbEZAMxeoR0xsCkQMYcEqCCuZOPOpgaOgOig

mCnPN+MbDMQOy6cWp9L4JY5om7+T1Af5xAP5Up11V8
1mVJYtFSncqkxzFKwHFac7oUFdTRMipUpD0ZlnaaE0cPT8A+FH7wDhXKb58akUHNvJK1l7Z4Vy3y1oFL

aQpCVgHQxuPSNf5/kR26kXjuMEofm4FhHmdSqslukLTsM/yce9/6+NVfOWjuAL3B5+SfNs8N88JNCekt

H3HNFMS56/Kf4DERplgz75ylQt8S6+pV3lAHtfsPM6
PkkWnsUptUT7c64dsskxTMi2zIDu3+21Lmv9+7X2k2rsV7LTgLO36itUi3p02aSxEEjkxhlWeEEXezak

8nxc4u3iUFqJdzCaYR6n9qmsZ8SobFGPSuofwoXT2E7LmlG7beY0P8R+pUQzCA4kXj9krf/PiAXXQOki

/gvDE4QuzXywjeE/KopD8DooC3i9rsfLb9J08BOL24
BAQIVBgBAqLGyzZDHjJ6XJoL9fOPsocqyxbLN1KAv2ZIfycCf0W9lqzMm8ndf6TpBmY3DyatB8yRKcXF

dwoQSmaBtFHuJvioBkatcfyAxkcvxcYpECGAWpYY5d3yiYenOkjXW/Y1c8z2DaRo7sRIROYhMkUQVZeO

Xc2jPgrEgOfL/h5/TmXFkNLDbUo36JEw/l02bqyfQh
VjowRH/wodCo7i9oqtQ+qDWEGPwYEej4qnJnV/WavGXgbz/bWCd8V6d/mV+xI1qb+ijHS3hVx6FGg6X5

A/L5GKRxCX96NGflHlZ1qyntGuZhVoVXytvE6/iWBbu1g5OsE9tMkCFadEDh1dqXijRUEfEDkCYoTUSv

ZEUMLDjAudXteqqM8bX7q3N+X7SB3oCNRgE3mYU2lb
ARZ4yWun1FLG2JpS8a7usb2ijg1hnDWF/uqALoraa2sBz6mwu/Fk4qvbTdlKbelz0hsdI7ePsgUleGuG

ZSakQz4lJ9VvQnNt1EPL1B8Al9AWyPZaemSx1E/KuMsNiBd9WnfM3UTlf+rzKZ1smRnmFv64wHhNkQw1

WK0gpdz18j7ZgmEz3BT+r+zZdQaFMziOWgBVvbTCBt
KYuodHK5PDgD6YwGyAhxVWWOsW7B/oCUFxAxpT9jhEJX6rDZT7dmKw4CRrPGXs57NxoLBcfY1okbL+Mc

SC642HsaJ+Yn03O2xKVCWpFYfcuXgCXBA2qOVwYVNk+rxv+DoSb/S3U9gO5D5rptfe08kT299eH6F2Gq

xt9Ec/ieE07278nfw7LnqfoYi4pdHR0hiuLSCIPRuv
R941WYjYaB4me1mg+i9lST87A8s08B2G3KnZQQsoSHgK3oIhQqs4NSrHPDyJwMAWNW9vQO1fxbIRweTJ

uLNXDIeoMoOXoYZefkjUymT24gHXEYgzmzHr0PzdJO/c/0ZgLYJfGVBVNxS4DhjuUqpL1MF1t9n2tGN4

9/+0zvQK/j6kE6NEAX9VJdyglJh0JyC4wjUEKLsNgz
Rn5GIRDX4xWZMZrJwsYrjFrb8DQMywlzOGwdFI4XE2CbhqVVgE4+NYk2rll93lVIcFcmasXR9NHp8IKO

TVnontPorMqBVMySN3diyuR99Wr/8wh/8SJdr13mussAcOvpYN1JH5CDjT7xWRvLGkU4M60VT/sznFrG

XWR8U9Cn8nsm2QpP5MW3ZeJh0c5zhP0GoCMPUAqyXK
3jmA+2ID4GpoKkstGpjkbjt+/RvNAcZu1RwhcOkThJnG4bkfhb7zu8dzrL+Yzjlvijr7NztQnMACNZbO

S2rw6qQ84716tJ+4XVXdCBQaS5rs4e2NR3vwPqpv1QfPcE2BHjtP9H4r5huaMkM0Jy77D7IZ4IGl7VOD

CKjDh+qx20okH1iPMDWfTfRtmM2YlrrdgwFr+NHkaP
RXII6dDXRcXq3uoc9aFKaAHuLhfElDm4W2OhRtjIx94wzAA+61ekiEFRpSZwi0/6b0Zj+1h1SJ84cQTk

hvIDyKMwh0kgImTLi0QP34R6F8hc3dPvUQ3yDCpgytqSbJDIx/8jEysaAUbi3Xnxt8KppuKtltcc5+yf

/noB+RcfSJaNadTASb2ityBt9dFtJy499T/IVkJY8W
C33c0wJfWqwbwWIyA9GQm8BQetzfshZuBjDvhIgRFMNv5qLlW04JCnNPqTkZIlkqcSPWvGcERRjaAzpc

l4obcGnpfBuq9GhkjxTHcq7VGw12f6UB++biI0CQb1KBuRleG8Z4n3KJjjOtmHY9l3TyZ6Lq++NBB9pS

Tge+l0YyonBytTNCNwzlUcLLXOCDW1Y+icbtawxsvy
777bl4NfX5yX1yuZggkFGo21Jb8PJCiGFElCCsaFScm1JJVxTYS9eARj8XqzOvMQTd1Xqi/4wNJHz97T

HiZ3s+GIHbHQbfiEwWKRGQOaYTq55wWKTnL2j4v0yLdJS+I/Scr/BZGgT4gRrvZNJik3tRa9W10adb0j

+yRWVngl+T/cKSMk+xg7QXOALUf2qjn1AjG1Dgq71s
p6dn1GrlkG3XAsOuHt61XKiRqH2XrvO/V4wIfNwJpcRQ11k3FwPFsGz+U780gW3k33sifPu8yCyH/Trino

ORsUp+35y24nI/1pORpzU8HV+VCrUY1eloDxIhX8t/rUBamWTFrWt1DuPUIJdYelszTH7erCPjs5N57Y

bbsaU3tR/I4B0Sl/0+X7HcVMW/efiFypeHh6fNn0lk
XLydRujzUpKmVNn/8O1TAWy89NcxnRKLPjygo6DfE9Kis+ahaUogeJMmS1Oks6YTCsuRvhGK1T184Ht2

aNpL05D/N8gD1n+9UWk06dAKyteRmiogcJ+XZk9+vOSVzp935fB0OlbdhuUwsQdUn01cBYdech+x9qUD

Z25MATT+yUWXez6V/U3yBqkY0nJa4tBtTGIfA3an+5
O19ib5jVZWwNl9j/u6sdLpaiF5ikPVKF+Bqb5hI7mm1bz58jWOEYG4dyFd7ZjqGKYk6HkJp+w5YFxWjC

CZaE45cUMeijFhs1PsTicGccZ8V1Mg6xZfkPj9tNF7N24zkhyo2BVNPC6W7e0Ig9qdwdhK1lCq6fvKSW

1reimQ63eP3Lypkuev6tKU83B/GWPTQ9ZdQQMrfaIs
QLwvEsio44DsyRB3FPunDSTksiotcs4vm9afIVeID81lrdP7rftwcmj+O9cT+OgPnb2RhnwvX71Glkfh

xvGU9lix2/NMSCZ7zyGCtwUxVzrq5oeHRrHSBQBbJ3NC+t9ZGu4Wit+QHT08I/bmMHdOl9VkMZaAnWUS

7rvg9SWCmXuhw02M+V9IC17n2cUxU4VvQfa036I69H
XBfVjXqHntD4r/pL80f4G/LDgzXCxd8od3nc424xAvo8J6r9nJ3S0RHNqDDtAnLaCBNFK9dweKPbwXuM

lfIcVv9wLMJcS1Ov95BaMSnOcZcmLPONnMo9WPO7dTCMa3+AGZSTpPWUg9OyjPFWX7/dL3a3xyst+NjY

zzyl3/ax39Q/wP8T/E/xD/Q/lQ7w463z/3BucsB0dK
pHFENomL3Qni8Jhe2+F7iJXj/i/VrcPZESiESGdpejZW6ALU5Ko5mde3OeOB2ET2+dL209+UJynk+RzG

egzFGvXQAJwpJmeb0DJ2VOg6iFg7Cod5fYW1eAZZ7g/Mcq57jfrMxlfnyJRCXII12sQla2r+/KNLf8GN

ZHGrSdvzXXDNV3ACarWgsWS5zYl0o7ebC3ArET8CAD
1SyO3vOjbRKbXdL+V5lLH7VWZmbo+Cjk+daL33Is3TYqoSDl22ZqTqeK4gDH8QkgzSeesz+KCovMxELD

0c8WyoOWnjqSn/WXXEoh2EZvTHuEZ87VacP1XmlWe40mwBUpXq58xe5pFc7/WJ2hGtQenvQR7VPt6Ovh

XkVfJDSHFw9tnQ+cjXPHQafI5j+HQDZ92/BiUpLozr
h/yHT01Udqaxzxq5GDRHcb/kuYkf6a3/cD2G+r7zRq5vGAzhtcLlv079wLh0bunFL3y50nckJwdZd4n8

q8cEpzh1sunq+oXXM/XMFYNsRqPKyajynRjGExr+WU3yCt8ar3sCNg41l4NvN88TEryqHUUat532lvN4

MAzTEzQaFaO7OJt/kboO7IgwzbXKVT5OBBWiW2lMYI
0fBzwuz6Uhe+RupF7aNN4ziDuO4+xTm+4qaqM4K23mCpg6hBnCETtVbw+dh1mhk29P/ERCb55+UHFoz9

BlS/SuT5Qc9JOTqjO05bpYJYSmGxRBP8GFA5BnBzKUNZ9nOp7ClOHjwg77Wa+Eqo3qRsez4IC6gkoTEI

Odh6goe5o9GURhoPkW/i6zpx3y7oMF8A1eD6Xeguot
NSW/1+nS9/pTJMFk+JUu9/TouRaDp1kBGetNtPBwCQkKlf7kI5mF3Yrz3WhjxNf9roYwxx/mxnY2nCVK

BZg0aWsfjpC/SnMy5s43uMTSY9ybMgSuLsPYHRHmkaAqvzejfYph+BKZ+eXFT9a8ky1m/vOtFEJB9zcB

F5Qjc6JtTyrAgCAbMcm6fCbd+GCjjlA8HHxOVLnchc
VI32mhA97/KeRPLHKi/IeWZrJh9PVqpwQ0TvLdeWO/DX39Lt5fFnT4rFgr8b3kMqXl88jfetTBiMk/JG

1wpDFdx5ttGpZBu02hzpub2LQK9jEGWirfhuZRvst4EpIvkKKeM2osRkpjt10g1rZVF6Gaz3qU/Zunpe

lvdvREaLbqVjsF9kRFnbb5RDZyDv9OV5CL/8GYAoDV
H0ZRBf6EW92pxbdVrAncRMV0o/YbwjDBdqD46dNyGD9+V75wirq/5rHLxfmEgaOk4WoslVtuo2kq1HRs

Hlt/34VPd7YS/jUfs8YKX8tZUkucuUne83w4Qb19dqebhZ5ZWPpJcW6BPjwjwR2MR06tr7JQtSp3PX8c

sK0+IctDHRyOQiHRvquzM8vwnEN4ZCxZRK6LdgVoZr
e7Gtdoal4EeQsihJWBgaNMTICBkE/ZCw3jAAyzhsYxY6uR3IbPJ9AGsPrwlJgNYjXtojEAe3opNqyC19

ZB5u3VSa6b44o3gQ4BfFrXU47y+Do/qFyOoUe6xUbnZc/6c6/7UOl09X4J5lwScsudu1T8yIYYlNZSAj

iQTnjaWNNDFvFLFzpLnMZ50t1w5TV06MLGo2RnNSPo
aB752baqA2VSEAlNwrTw2xD+Ah8s46bQdJA7XFXZo17Z2f65S9u1h3rr8MjAjwwmja59xT+MAfftiZoH

2AdxmezRRWcPSnfhwO+M6o4QZen7iycVzheMXwck3yDF4U/oiiM79mHNQmv9HW+BB79S7Vc3rEzNjEia

qPArBodCtgoCDnmX5qlrqEP/aRql/OgDJIKDMIc9iA
C7oz6geDH6gO4PNCNz71C1YdG/GeLjOHk6gLq25wqUAiPQbbOL7EEGYiNs1yC4IC/06rd75JSZxqGbut

tk3oymzL61w0g8Da+4YZbzdq1YFmG5AXW5LKvK/Jxg9915iz9ssgzRSSplWJyQgPOhWsoxB/7zGeyZ3a

Kj3lZjx5dX0WzsLNmlMqKqbzNAYBMCI/AsI8HDwoGv
G0MnkADqFljbfpxw0UfZO5PKBJ/pm5HuOpe/wuS3RPdmka7LE6HMuUp83e13gapk7m9pNE+prI6L0+63

D0r0e8Zmi1B1+OsKmANUGAAZZ5Ztxafb+yKHszrrDWb09TjVS8pDBmH9wD4MIlR6dpjtcxUn0KW1C0Oy

+9pQZqqbS55ZqrZaEnAVCL7w8LsRvwc3TOSbS8Pgzy
IhdGTc8Bblvfsx1WI+48mS797SdkWD+U4fnionGH015KBfqpz5h8hhT9XVrtrhS2/G1sDijzPrPJuZZ7

NqSBwuCNEj3UEGx1oycLy7BXAHKEllxNrxHf8obDDAvVN4tScRJSVTVt6msZSTJ8Qv1MxyFRCBGJhcnU

ZWw1MVHpEr3gkTQ0sMvprqbmEfWCAzsxr8iO97WAaf
JVUSczCQH+Zo3g7RcVVg8L6afHy8c8PZdCkOGl9NML0gPKxxHRrt9j0MLDpDmpFO0MqzvxVjEtpsdfeD

4Xl//j5vLA8T0atDe43yKFLpWjIi8zs0dRfYJDP7uuhuMi8fUp0+aWDktXiXLkuAM07757F44kLAFCx9

F/Vh0qM0YJ5zPWPfzQHDc1Yga4Uvpsu+uYx+rx6Yho
cNvoww11uImHBqWDFNAUwDgxpdOz47Cj22N7PCFF3xHOuNWkbHh9iXvCD/HET7RrUNa/96L3T0GkVPZG

HKJlCanU1oX9A12MMrwONGeq2pi5mnPPZIKYzz8S6NtO5rqJzr92XbiJp4TUu97k5RNZiDqISzR60DxI

wbI47x6AvSEC/ungx2U0305mETDqnbWiTsJV2iviRR
dF+mR+r7H9pD3PW764I1rkhE0IeQr6S8ttDgBh/6Mh/cVslk+MhkzPDyQaV5thbq1Fi1dnpojq5+/l0q

YfRGuuwrJOvAtm9rjn5NWvjlMwrHIT8xPrOLlWfkUxWI3uCI0YSFDprieN/K2AppfiM8+/wuewhy3sUg

E96/flR/R6XIocHQDutwGQpLMpz74DuEJvh8pbKYA2
dbnCemwfmGSaFsq7uybWDGSK7KUGaqTqArlapU+8/cXthf6oKcBHDlarQiGQbmbz+QtNAzy7BMmD1uCs

ZMORrNmkwHgUQLXLWZI9dIs97Arc4yjW3Gzl1+/4O1MDPwGda6vx/5Gfs+w7y2Snc1QhK4YBxl0uMKV0

1hw8I8DUL13/gJhUHHu7ktJNSxbjbniiTj2RfkayrQ
1JwOeLHvC40SzAdylDe6eoEUXFF2HQ2yyn4K5zkbdg17k/n5BBg1LfvnsbLXlhG6P3+OGid6baXl6edX

P1BBwtzZLCTUgD0OTmAF5CMri7TOJW+T2GzwdYA91gE4XBdRfpYK1sAORuU92Xjb55nJw7P5sv5ghqw5

jOixlkq+onqlpAUhAv6ipDJ4kbHHJdRW3juCnSRq3W
NcWOe+LB2sNKdxW2vRYKI15mm1GC7BCtdtAeWS6sZQX8rAzGG7dv8kMiBM8auHR3a39WSYlmccBtmJAS

1y8ggeo+WEU40L+wVAS4dLM35fUc3ueWRqZANFXqSpkHv5N1owmgFpdL1ls+GfJAGFyFhVmQc+tHxk8u

ytbCYKEbFzH540Q5Iy1OueD//nUB7fJvCGFtbZzyjA
4nhdEqldO5dH+E15FMT7CPrL3fu44RdDaEq7L0m4cpun9ukE/ljeHeL+zN2YdMGYvZLwimmoT+5EY0kO

y9cmVkXNRymL/MRj3de826rzBgSDocFvZX8uM/avBIXELyqpgbnwqrVsV9VX9BmFJdaFY6/CPVJfyA+1

HZ0wVBlRZ2fT1k6b6Sq8IjyZs33Gb1S3VowTs5weAH
CkyUcsC2Os0C/mk3v6ZWHsbucyIi4vCGMsVcfeH3KmiEYGoZLDEE3GwoglHba2bALCuHwx2m7O5qh6dF

TG5byyF6Tq6Ejs2GZZoQqRbdf1w6PSJdDW1XoOArmmq1sDXx2GMlL+Nf0zwy6HhCG8f4vHBjEo5L2TZR

PlSNl+lPpzNRkW/pdYl4FFptgb7GUdMDgj+9EuxeKT
tl5016xl3jtirQ66edwqmrg0/f/064Oo4RSQcFOXx7kKT5bTo5UR/i1k69eTHPPTcVR6UuoOuxbAe3U9

lcOIcTpLJzwCGSWkxDJBftCwa+5KSJqUIwnaGyUyRcKUCqUcdTFR1tumNGFVa+ki5bHXluXrNLaxNKCS

5XyvXq/vP2hKKGX4ycjG8Sk0+lXOzIRs4gmdcbiifR
dq42/wFvzW9xxYQxqVdAP6+R50xoSBYELIyQG9e7eQH0n+9aCep+O9z0tEh4bkJcL/pA4X1kxICR1Xxi

fv27NEn/8NadkC6rzWn4+/LmT0t4iGXv05xZMI7a5OzeBiA647d53W0arbTVkxTNvN6PsK2QXO5GstE9

x3AagVnlmfkQktXC4p5TBehSl20dHj7pia4MeAq4h3
7zRDHb54u0Q1t5re+/67xdh5kbG+Ha3Cw4akPtOkf8b1VorJTY/HzeHcsPNPYI22+dJLjyhImU1sFrbs

HHJf6sefs8smivKYXml3IQkZHPNEKpytLPrj9zmBuFU7161EWprmWYeOxwhsHb+BHz4+APdtoGwFZqvX

q4ck5yJSlC/PaALIjWSmfd/xfkn+g90xtw8n7EDK/7
/GtTIBISZpnKxnsOsNTAxQFOTn5YGNUhIZcx+EAYy2VwDfNLvuBn61yYBVEOFa39Xa2A1Jmj8kBl2jIt

zTs++sciAOcDrOYrRXg+eiWUXzMldZTMGxEzAqluijYVol1Kbyp34EtEoA67LUD8P7tjaHTgwHq6ho6k

zZEP7QYf4vJeMLSN4eZrxfi5GXGTO3JUjP99QU86yg
/GbXDiZZa9PobTRgYsgaxR++5agqibDG7PHf7pT7vQef0wX8kQGp3GoYMbDikUvc0SgJdy++vcjwlSsN

htrP5ju1x5H4/ftcyL/3CCGbkhP1OKqsimpzMkDwrVPpJ+VUZ6osTJHMcRJ8RryLimGWpOieVgJ1TQ6z

QD2lCL7a40Jqrz2teEmDzF0JCfB9d1vpHW5tX81F4+
mOV8OlTOgqc647ZQ7KCGzkERPNCxRMLQ9PDz6dZL0+CDzZm4MJ4MMOAXq8hvsj5j5vRfbsaH/UFpLcm8

1poKcGGf99dHz6zy7HrNW6J2LJuSHbsC6gL+ALECm8znXiYyaQl9rb54hGw2UrYh5Rxah2ex31fBMe7w

W1Qpz5BN2nMAeGuckJRcC3Gekg3IREBHO2wRbckIKm
m2DQmtLZIhgLquL3DHd7f3LLEVdwoPk4EsH6jhBf8BwwDQ2a9EWBBL5ddX0Mx/EXy9svnNqBG8IRAt98

FHUyEhRCvmNJxSejaZejWTGfKFgxK/P2JsVZtHv19XEPtkvBVdAHSO0mUOqBvddoO5t2z8Tu4coZVoF8

QNI6Ps10LH/w/Gs+Z9opeBIjo2BTa/KhQznoUJE0cC
Tjbs+5h4U+yj/cXWLc5X+JuWXeWXxTDIi+wF0nW1AzCnNBEGWRUaXRKaLI519112Wi9klPHEo9+j1/COON

uOgNAzULPFpNtG6XGuETKRGuEh5QAOGViytvtnA4gXsQ/tuI9q9KeGHuwbd6khsv9gp7G78292v25xtK

yXH53IFpOpG0x4Adl+e7ZknEIiizgpljCiihoo9PRn
bTex8eVzg/7IKZJovBaLif5BYSadzQ0+e+1OqOdN6JxvKmJs156V59BAGQRyXfAom/D0JnKX1nXvE1jn

u/p3NQoyVBymcPzqBove2DzS8bZZoxOHSsYny8GLpbPzxB3dVoawhtX3ESX1D2tEpQkv3Qu7cQIcYgbs

Tl4fz2eQvtHqv65E6J1yNhj59P43RPHKs6p9d9zXNA
gjfj4qFldE6/8nJZfSLuoB1xGLKG9VyjFdxzEnKOyHnuLPWs+y3iGqTcEtDQlFJ9ELfi7Pl1k+4A/1oJ

HMAju0hJdXSUThO159CoGK7aZZlSWa5cUiTfVu910pFPdnM5QKHAnd+21T4OXMapNLqpbdnof0yPGJja

tRvPSPfKB8mV96v/CDZCtzEkEWv/UjfCddrOwsmDlY
5qNRlV7x/faL08nkgIrsB315G6I35dCYJG6l9Ka55w1muiiFAtSfvnRFCgzqunHr/f7oTlj4sws4H98e

nYmePPlS024tRXN5Dgh/+Rg+wTydVtSR26MTUZldEqrYprx1rEHgJ3HqtUttCpelaPpWWGh7q0kPgRBD

mnCWJuVgmzCz7kMUh4+/SCt1sE/VG9AmeK0In4CC+t
HBHHLNALgZEj9DrUQUUdf3tWxTJ7i18K8EBFBe7QSBrOP0Q1PvT23vd9Zp0yl6woxVzLD/zliMAMDr8P

gWfYiJH+GFZJDFaQHIFDOEPTy5bsI5EAyghcwzHaNBFulmbehsIqM2Fb3hLEvDUsiDTaHGPK1cBKMBlf

gfn0p6bGH82m7QLU7XM7Nmc/0+fqEwZFGjeUP0w72H
E7dEQvWc+ObYS4qesb+aiJdJk1/mnD0E1X8s62jlSmDcYO7SjTvi1/KjjOcrLsm9d25InmVD2F7CJD9o

MumV+qIML3sbvsL19CHCxTY+J1lZTue34qVTpcHilRbS8w0YQRK3drNNXM4qNJmAqsFHg55O8kHc7sYJ

0mGIkJb2H0BGMn9LcSsZgjUZb/mApOy6gp7TxZcWBX
clhoJQHwPe7LTvX3OX5DypYlJVQz3SSs/HSkyOy2wFvOjZQKKy8/CNIrCu87YVnFUraN29k5481D1Z98

1ZmSpS6RwuXLYMeyJoPHp+skRqaXIZrVAWab8Sns9tJPIZ+uHh5+U1q9buE/larvK0xeNrqhawmYJJqf

Z3lUSJqN7ZGe3w+VLqk7qNlVFxXTl2a7jPBqSOcDPU
NpvSP1P70eDK6D6TyFHBWCXjwdmMn/SxX+XvOMvjLj+JsZ9IBf0YUgUSjkdxuKwvhRTHD/5AlMvl+JHl

hLJhGP2cr+4f2hjrtDeV9O/b4cHugwYLh/NmWQJxWBQlq00OlrbMgMxBa4Xpr/nmm4/rGtt5f6rJBNNp

2Af4w5OGatUKT3qO0MQMBwMDwaHnO2UpGMzdhuT8HZ
FMqf+mjOlaY5m/s7NTQnz8MHMFWiMpVuzpCMLpfiamWwKPf8k8FNSMOgPPdp1VLjk/uSER4/mloF5g5q

q29rmPqN4iT+3n/kCorshVvBLyGIZXmEGVB7MS9Nxjp8SY9Tq9WL9gXS1f4rFUUakU7ZCKTckbClxjgt

rnR7lYemWgmP8Dw7s+6nDrlR5f99Nu50ykTilnrbY1
0KSwU0zI2KevrMdy53QMzy9HK5qTU+0iqOQ4GP8ELDVPwv0qSMbelBXbWenWylHpvsGpTi9RHsGDXfyP

vG8uV3kq+agNXXw2+m2uwYi8YvacDxFRPs8U0D97eZlCuuso8JJkwrRYib7Z7O0juH6pcMESUnD4z04g

96++BazMO6rizwS05MH+HOL2w4PSYDFDU2gww72dEQ
n5kHMv2/zDtBAcXE47bvyNCZrkewFlZgOYgGdtE0YQ+jZ+EC675MGp2484cxk1j0EKh/xH7iescLwm7/

96PWdIyhLfn35riYRzNWiEeKvTpN5gv+pygfqU2ljIcBamiE0XdYECXOEUutq6l9uSTe1UW3688ChyVo

vttNl9ZwHGe2IJTDnhg/8K2I5tdomk0lqRM47Ij4PW
DcCxKiirxWZ3opi0/c3onHuFyXmSG7sJc8gCT6HPR3z0YQZiqcLuuGuP8e3ReKka6EXT5k9H6w186GqM

S6879dKHNQ4Dw4zhOV9cEGdYWFK/kE8wV8Zj+lGm7qNeCFTjq8yKg4CGPntP/9Nzrdc23nFVCCmFsXPa

xvb3RYgXNQRzZ7D0Ywx2pUwVepHZ+HetejVGR+d+5j
Rb+Wyu8GYGJjAYBautSGUG8mRmvo7uhi992bL2arnJFW+0JYoNkAi/jbngCKdz2yqhoh7g8hV62rSIop

SoLCI4z84IQJ44FlBppI1uq9PDQfdpLV+MK6vAY/r25U9hJm+K/WCQDPgRMzTy8kGfHsTA8UGfsoxKxe

PGV9fZ1velwoeIcY5Oewpbpvbpq03/DMgQNZICXRz0
kL5yF/5xB3h+Dk8IsTk+ONTpZmo6cgeQTcfeqhXpV3LfeRJS+EyLzHaKVTDvOmE55Fx3cxjwvLh3aFtk

yHN+qAbju4q/gi9mljFNx7A51QbX6D7PUPecdHBwp5Ev1Ri4oEG+4tQsSZTNZVHA+RWMo6NlfJ13LjtA

CFoZEbWgLHd2uw32ploBienVI2m/tlJIYahraNeJwA
3Kz8/ZFL1L+DT+j60jG2+6vnN0UfQflyaCLj4Ntyx50gzZ++UN9kUjK+LTjnieyXYzqmYYWyS85cSHgx

QorsgDmKwNc+AGgPcgQnbGV76w9HmdGCtEFo/bl4Ud/olyqHCFBCzjze843oeti4Etz+UnlirAqupJ0t

uOshaqL+PjnPZ2Djb5ShFWAzzlY9EAiC8qJVxUm7Li
7Y94hXsgLFAY5NcwD649Yy+hvFsmtCEwwqx3IgXPXrXc2JHt0DlFGi1lX59PJLCbAZ5r0ql6wvh6kU8v

KjX+CUhYTCmnl9MY4tmhv0SjaFrfG7++vUcSJqQuvQkrZOS0i+RMIrg5OOpuzbTEXfjVkprLWv+cHOqO

6lR3xu070xQcuj6dFZ5MyhIYnOYgLiDHcmgy477YVJ
yzYZemUGs1CzLL32PyHsq0qQVWZ8ntjCJOALcHI0M2Nti40BEKffxfBPZPIcPEY1XKq++E8ckthjHNoB

aymd5Swn3E2JZsBgzpYlJYEO8BSXLznXTi+oC+XKgMoEA2jE3Quzf06AgeiOTg6h43ifxK0mvj/YyJeK

vGVYgDgaogrQijlKw2SE+56JtRa0nNqN41s7SvEtLK
+fbNVtXp5DFdjXOCxCiRh4B55PthrXL4jYLu6WkWx2TCQzH79K3zZXu3RskJPJE+Qz99OpdxhefVvbc/

5mo6X2VmwZT4u3n7f1dKhB0QKKBzFgwiOmjgmqyRjB/EfR8pghEhkUk/SR0uha+ZnRpnkR5/zgg/ezDl

/D7ToS+NE9MowtDI/4O9t/1V6o9oeWoGJMvIFoENpG
K8WYjIxMjaFKnJKla9IPY5VvVO5ljN3G7dJuzcONa7+r3r/aN2f4sYenr+drk8rDC6mf45v984k8RG11

bqEgFpYQxnhsP6yPwteQis7AyaSoYa6PyhjaX/UUXxsPL2qjINroHadECkltprmKWqJd/gYy0Ks2bcri

VW19DVu86VvmGynXcIWC0/Qw3oJQEekEiVXLlbQXIb
jdzqGvN9HJNCVnOP36131LN2nKkdplkjYrR+WczlMFRJDAxyTOvLXxuTtbkJhQyoQOv6zVdistmNdmOc

G2LAsYwwusxCl/1/JC6vuNclFFsxLgSLiDYyE4SGYiGxfWdfOfLo2SmPvPaCsnJxHHkxUW4bN/i0xLJP

q/TjnK2o4N4fSJgSpFR91wGEK9k/d/pzv7/z+QhRL2
FwcbbT4KKaWjRjnA0i4yI8FA1FwXCDDERLzz8J8Eo3He+krZjRZ5MWgKJQmq05XFjOenh1Fv6svAfKi1

sK/CRxoOjyxhd2RjaBUgXslKwrAp2rq9LEraasd+DbTuO3DFcEfaoIikJG/AQ0rozeGYeuOu7bRVKUdJ

UZD9FQHcnQBJoQ+64JzyLdh6FGO15h/jqTVYYL4+LV
60vNV/qUZLXppsH0yGPHmQFLjZc79d2uaSOhe0u8bLYG/2QeHcM0AVRKTBFk83L03hA5+AC3hTywPQuu

G4QMEazqsQgIjWV2tkR8Ti27wnV/i+8LXXOK734yopubbvpetLNCZGweVz+5RIGydhuqRsFDVxlgffoY

BAMcdMjeLzJsHgn6mrUwGCepxsEltJs17NVKvtn1Au
VqxES5+Dy7X6xYqu/OU4zCOuqfy1VRJcJDa21mdsajLdyE1jTKrDDXtxViH3MWR9ChzgCShmU4fV+SjJ

mLfzYGxof7eYh/JoLVPVopaHbP/WDY8NtkwqKKdR8jqUUbh6Z2TCm9mTfRuVAs5IPvrTfmxZAIs4fB/p

DesS1qNOESYqSQeCPE19BAKkoZIlDZh5/RPXjyVDHY
SMWwt3N50iLxWP+2mBgfxXcwmfhy8FPhH3u+IMIpVKCMyhxUPnTLIrneSsxWYyBFhDaQxEvqzbJGfaed

isjs21QwY5V+eU1eiMUWZwvb27siG+OCbSZaiu6d2V5hN+pVsOTF1ZxRowWMvJA/VUK2JTGu3gCAmXuM

bTOegyuZ5qACS2uE8N2g32JL62dOjdnM7cW0oVS9+M
stzqCy+ajM2m1rYojYsV5NtOP1Ih2YLPG8+bpG+RkbE3GVklEBsabvD8VdH0V7DvSL32TE18cW2xCHfr

3xhBPawKGnZAwCPNrxBoUSd0yG6rDnuo6ZZrJSD7oTeoqdaTEeup+5DQm46mg2fcYkAsgoJ+Nqnr/smB

eo4X0RwnZHEliMMU2wunbv+WptTWb5/q4OKvxI6EWx
6OLhI7qUg6W841t0cDqOFG/mY+KIcQrjYymPBxyfEHAdlf4c9fPD5zjmBlkj0/U8k3WH3mdS2d2w347s

H/2odHCldV3n6951QPwh6+l2R/8tBKfyp3BnKNzqQbP7V43EFMrgOmDZ3l+g9n2uwYZCj5t8CyBKvLOR

DcSVC5FH7OQSm4WKyOvwqQo7rxV1zWEueqpA7aSBxx
lI0gPh+6gCU9tKdkJ+qiDsbU3ZO2uk87MFbeVG+9rFLZ+Qg6QuGHf2Wm3No1cb+mNbJYEZJfzkQCVX6A

/l4fqL6PqSkmQfNvFK3cIi0fZWfdL1DZZ+Xv6ECI7jgrakye8KVdGeVUJ1YMoWsxXoQn7QehkHzFkxHf

bO2nPzz/XcJUoPyIX48uXSCvyuelfT9OZJ4V68Oftk
TJ1+40QmglN2XIKqWacCVcw7aaTFHpIDl5CT8rvagX9Vibgqkdc0jwQgF4VZa4o1O/46XiNOe/2uFaf2

U1j15LnknmxfQjF3aaMbnZlHOj5DI+AqA/OO5Njk1oaFg0HNCn/y5+NIxjiUtTn32qJAEv9PwlUxzdox

KwfJtiXAI6/yFsF8Q1Z+9FYw4d/PlN6+Q7+GeOpNOh
fC0OKJRhc+Iamf1Sb0XPMnv6M7A0du7dtPswIvo6ZIQ3nG2t9MwgYOctNeFvr7SFG//gz8r2rxpNavrD

5zbw4P99OrafIcoLAk+qvakXpI1YVD5WmlvtppjU9JQOrITuBSU2MsWVWBoTkizjQ4ybb3VJoGu0xX0O

/ub4y0FM2FGp9PIkIFrx4t0Ridtvq0k/TAu3H5fK/m
wDzFJcpk+jfBBZSKinGYtBlZCNJ1NIhFIUlEBM7iolvf19bYfUJEMyTWtXUbqmzBm/PyNWa/HXtVfVT9

71AC1fleA94nlkNScBz6KzWMWr/rS+R3vj7A2rDmzss8gov4UgbRRdWpQfWodsZiQNk3ohqn1v7Cq9Qp

o3nFvXxbcq/rdnMd6yAmYQBYT0i5nTjfzGFboRyq74
2JK0hdTSaNRc3QD95SRziJx03NzGlBOnI6D8rP/m1n+SXJNtjhSkYVVCTZqc81gwrfcA91rteLO1VBGS

iOMGOzzsB9QKAunqXAL6PwnS9rKAQO2xr28gAGn4CMceK5lbUUbOCT9SIVEVheqD0PBZbZbICanYzHut

a9+GITH8tkhVcapv6hVqAfCSX65TfCKMrw8vx9SkhW
lKGBwS/lryIcPFt+ARkqs1gCeGvc0nB84zY3hKjRk8UcnjNzoL7bst4EwG7I2Ez+1+CHsyyurcKx292J

SoG+9G/M5bF4xK5Z52Qln0Z/p29398qS5b88K2TZ0kC+WCjXy/gprlI0qODymp/YE7sa32HXs750pN50

LuB6QzqCggFq7FZV5+Qe2hq4prKg4jezUb/hU5fOyZ
AzY/PzaS7z91RTdIjPlG8/Y3/DzMIbRpkFMHKkP7QpatQhJetQqCPCSwamACPHt31vuMFn2niuzLI/E8

kohyEwBdV14cIqhYNL3KaQeNfBPDEimjPlFEscgdBNrcTQYoJDd3jw5NiCWU73HKTlGTfVBfVkBqhOIc

1ywG6pDg5zggsWWv5zZ/4nHY7md3LIAkWS/bc9/lPf
rQrk2ahZs4llfgLv59XEEeKLmiNZ9BmPg6LUu/M7RudYDeG1+OPohIUsFpd82W6BtzVx+o3rtUhvGgeQ

I0wNI1/fq4iemla5YiryOh7gwS6Gcie2b5P6PA0m1rNwEvxrTiU91enFrec0kTbL+u3EdyE3QBs/zR0S

99KY4HppCGCD6NBkOWVTqtu67opXJi9jGRfnPHFySJ
o/jhJjBZ87RQ8tLgJYnFdmkGOGShAGOQTl8RbzSaqdafQ+HcBBQ36gA60uehDJ25Y+9nx7nVi6MGBjdB

ri9Xzsb2ieMKbH66aXmA//SqZlGXc6S/xkJLYjdFDwJfb/ZwMjrkyMpJgNcoJodXfhJlzkmbTnzXDPlr

5/0MX51OS5DYiGpcXwKTLTN4fiBMXKHUIOXeNvn/Uo
5ecElWQMHYT6LgK2lV4EFXNcvPsyxvlyEedqmQYuydp5uZ9jSS3/guq3t8eSAGXQyYkx698Hua9ggJKT

4zfj9Yq7Ijq7UXUlf8GHl2BQGdf973JxtM1xhu04TrHT6aqW584mpiUydGHaWE+dDbPUYHGywK+8P9JW

OSMEAwK5Zlq48rOzfcg1ZmAibHCxnKZiANpOrX3dzZ
W2iTqSW/LzMsYEhb47iXndZiC5zNoQyvCdj93pt0eOe2CEp6eGwMTJLRMy1ozewfABEHkeL019roRU/X

4kJmMui17Nueetm3Efh57YlcdckuxfyndxJUb6nfLCjOM0LAyaSCqT4OJ36IC3p2bDIb2UjSb+KabrYC

KkfFCJeqPrga3I+GQo5nT8ejkBPc9LlonklSBRKaQV
L1jEUjN6v4kjg75/vIYz47N0vwY6WzBYO0UuuhduX3/4Q1FLRwgBQFaif7v3PvHfmu5KWJp+djTuO+4B

t53O5WiLyxTiZLfnHra72lbECvfIHw4GmfeiFiizt3vzA3RGq4IutpRdBD2Tkx+N2pn5zE08uVQ4uvh3

OcntRvL1ReYV4byBsI8s34BWHqjYwMK8rMtYl7VDN3
aDqGmHe+YHAzC2ble+bFEpTwB9pDue4DB2Hyei6/26i0azE3k0xTm+71HCDsxAOLPPk6EwfX8WE/lSAh

2C7L8ymtSvteglf8C359X6OkDf9PHwED4lxBnWV6VEk+OYVO78wl35hlgvWMQ9b4bLKy5GYmw96kr0ax

3G/y1El6x9G7pDL+lMGz66nyapV4CpNMZabMjn6gqt
cNYcJv0LF945l7WKOBzDO8fb0metXFWoSdYgeC15hh/pimwsdYmkQ7XK3Bkoc/1z0Lyq544rXhpvxrPG

Ffgv5sjVeI8OsCuqg/hLVOmblOYutRzxXKzCVXbqQUrBUdoMhJVI7TzNnuYH4PFYevwCCy1XVUZOPBQX

jAZjcatX+P17LpUItm2N4v/68NARRcMA+hcf+tr4Ww
4vpJGJtJPwFRdUjX/P+eh1ski++EnfgCn4WSg9avvsKAIzATebDGM2wys52Exw1F2zhXyKu3dUe1lL/T

2r127Kst21ccQtGEQY6RNoqwLVtxF91Om4ynh8BBmxIsQlKKRo31HiZZlskQtgbIUTu8GiH1+lpuTfhU

YPAGxw/7XtpOsYfWncVfR99FOKrWkxyGLi/hvmfdz/
e7tmY9aVF+6LYauoIq5dJARDgqQn9x44v0JN2AJUMpd6dJ555N6Q3BoSSEkPlTv94dZrpD7RuSDf3mpK

ggivvR0AbrLxBz1kOnr187dpMHhv6roVpnfr0ExqLIzosLNnJbr2i+Gq7tdeV1TUge/H7FiaxzO+qrCX

8J7XcE0hQ54KvWPCgHH19GCy77hg6PR8zcR+n/QbiS
nXRHg6K5JPQOvM0EozFLgHasWLyVzCmpVHpRfxnvk4Sbm2TfWMZmSb23pYY8NmeDsHS22D+EbMcqzd+/

D+b5LW55wfLgu2mZpM4q2dzPq+3TnT0jEmNEicittGdFXgdkT9CzEiG4NknDZ99DQJBvb3elIacJ8sS+

wD3Dvk55XGXTZvJYq9amzFgWKCaSzz/M0/eVdyXEUq
X5dagvK3DdmbAtqhyhgeV4k1gqU51B6QIWIidYCKh8bXZI9TN/gdiQUHIzP8q4+F0F2NIwhob0Bbiy8W

Z9iAtorbdsp4eJS3XKpjfIRxzb872nLRIe6c79LlHcTdBfysFnsXQuSeL2TXwUnPLi1zITzCD5D+Ssh1

Kgt6xDwDF5tYezH3VtPIta01CNRuvGAEB9guJjiEW6
vxzMq7jw3tNZllo8zoyFY1nQ1QD4/eqilz5SdrEZsieFX8G3E7ZBdgTUDGVo0mqtqVHrPxk6WT2CVXVx

etzsg/qvqm+xF9EKQ7Rbm9eM3kQlkeNJZM7IkjHdt895KVwFQG3DebnfQh5+eSQijWc1LYROcutW9Vdf

+rI6PVuWiCOTHulM4k1CqTyJ4MPKIIpLTbse0hWhWO
yby/jgDtjSCFOKQ8KN0nKLD8iM8NzfRD+c1rWi0Pe3KtC3laBr+qeQ5RmgQ+QzTG8099JiC+Zmhq+I6F

bx1e5c1IBnrfV5Jx/UlZAqsGPQqbHsLufEpKXE0a4DEtOQTBahx7y+oOUznZ/qyyG7PsIM5gVY8nZn0h

X/lRU7qk4be2//J0lJx+A5Ukt/GdFuLOjSG7QBl22L
uiCH3TmD8wSwWpY4KkFZw7tSQJ8MhLRxsZkZdAfiAN5ptDOS3ZJCNiR0V7tD6gMTH2eD3BozFfPTvhCo

BwVQ2oYlYT3fYksRl2voKGO4y5yV0bMqllgEOH45y4tXX8wMuID6rfmpEUBGwQdIeFZMS6mI10CD9p3M

W9q5z9rKsWdafjdKNum1JR6C7rTA3IJuLtt8pZPFKG
CQe6GM91kYNhspidnLQB1QfnPDbB5Mzl1JVtun8l4Hqt306pmPqh7NRPVPONE6C5OWzDf/Bxr6o96ge2

fI4Q5cijCMAyM6R4UQ1s2OsoNuWNNY6u+0Sg9+7zMUu+nV3aCZPRfOzzTZYz8bgGJ4FLhqUikw3L3pk5

ASv3zdt7tGssCuBo+M0HunAt/tUyb1ocknqFI8a/KU
p5dui3Nr4FxNgT5eOdsTLouAyqW5kWOtR3r5j8J+0TEoSPBRTiZE4WaHr3pnkb+mYPNzPCAI8wyKIf/D

koYBeth+pwyicwWe/HqB8xVKoqQW57SPVEQAJyHjKi25YFAG8GF+XWXwcr0cTTrK8qdLjYzYD+MdWhij

Ti+ThTEuOBwfy15QHFAzFqrcmdvgJA6THmx1d0omMH
3KIqD+9O8YCwoS5izB9fmiwzlZ44Wf0mV0X12kVfrJxKRevjWOhZMNPUPR7lMYndZfMeDnWOSz8B775v

Hwq5CvsuRpFAjAQcswvyPruJwvSx3W+/4JTo/4yEsOJI5pf80d0O3mUF+S6VDs/EgVmf3evOvH4jrQBn

MURPtHL4D+H17zrRJ6yLaHbEI7ebzPEapW0VN+ykQ8
gfNJ80h3jj7rTKQV8uF8ZIr9kDlNgVatFubo4Y3y+TC9jlYoBXz0obrieatBBkO7mqzgk3S/rM5ch4Wm

fhQnKASlBinK9uzGfwdYkqE3a+3O0ADWQZjwe0eY4IBc8AgVXLh3nSDhaDf3b2sHIIlvGg/I0lx3NLUW

Cnftqx4Ge9AZiIrPvYqg2+xLO3O2RHOENBghf9tDhv
ZGNaCSNK+779RihYheXeMj4vP2qz22lR0YD5kHoErJPRPZFbFknNx8BVKtim45XU42nzuUwA71ACMiR0

OkyEmJ3+e84c/9J0d7Te9V4Gnv4BGcBE8TGEv3XBNgU5CcF3PsuRYi/AGwayZu2HBzaHrh84mmYcGOh7

7qbZhviCLWP+6TqCfMjxRf7/AbF6SKb9mdg0irO/fv
367wNAoGLh/gUG1O/kAKHk4kFfyC/Pk46K7sEhpqCzpChfBrw1kK8rUWGZIKPU9tdxO3Y1rfGSZLoXWr

Ig9qOLk3BLQmOXTCYMJTobrgm8C8tnCXrQ68e8O0FHlqBeYNA213gmkVAfvRNmi0+uOQl6GftRjLJc1y

QX3Dde+Z944AolXHxO2jOxe1dyBC6sE9Xd4EWqWVm9
1sfSXZ9+GMlMdiKhCccdOaWLBYTzZWBwnVL4opWF/thQPAzv3A9bnjyNfXqipjaFGwKk5iw0/ICSs1RS

M7cSpmLmJAfz7y3c466knJ8+2nu+ZdWwJUfuHwHQcGdLhq+jyTI8Y4W3zdgR8BLyBuGLt1fRZRBDNrx4

GH4DmK49ftWp1CfICzfIDa5rIAlROf4dsoEDpdaTPu
fiFEBWjbN2dMZwPs9fHYBj1Lt9kiSDc6aEUK5pAEh/X1ZsnzTurVLFR9TJulG36yqhjs8LF4ZEfxy5r7

Ido6wwacoBjedYlC6VDnDzzj6VOz5bbLnDCw2crHYY1rER4C7z1jQX3WsEYOuXbXEFGF7BRwstMDhONE

bNoIRHuYwkMQjb5N/xiEFLzW7HgrTWp69Vu/Rom31q
LY28KHV7ftPR2y7xWOcmC90rnb8+i5R217vvAif9Q4D0t7da1ibR0MMY72C359h8DYAVweUGKP8d2EQ0

RQnTPY2I3Sw6NoYljRaw3DskPniCvH8fCJoPoccGQLE9uvsT/Ngxd6OinLeFKvty1tRTBFuMY6VsGS+0

Bl5Cu7xJkxbisgLUIqGJI9oVtZ09GMhgD7kzl+4DwO
TyrSu3MJYOC7AS8JrtIZm/zrFWtQLW6TF+zpVgk67LOnUx3gfmq6fOQ/j5niM4mX/4QsQOYCqHXqPsNU

EsYKZ+e+ld3gAYHKAWjcm6UF0JKMOGOege1pU09MkvqumVaqwiPYBrz1s5XG2TTyk9clOJMnUb/YZcay

+R6WeickICUBmyhl06fk1j5tVKLtwdD7rRuM1fdY7q
QrGmaleyJtb+9Rxpgile7ruH50tR/qLmmX+g/0Dw8/Fnua0acJ6Y6JG1irhwCs3pcS9UntEs2G9sM5Zm

lNDjTVCSI6jzsBj+0TKFTZ99r4jfi/Sb/zr38DxXr5X98cLbh+CLm3l78l5+knSfV2tq/z6t7W4U4Hpy

LsdUWjQwlXarYAkOrgHotR9zelVWvQke6BjuHcQECK
5TTEWHpTZWXMM9WSvWoO+eQM4+p1NWS3u1uWYSTFKEEU17AoYud3mQgFA7bmNFaoakDY2uzn8jFWvpJ/

4UrG7bVheedKbvFXPCXtbBeN7ebtPl7P47C/V9xB+kcDy0wlW52XOYZCvV+dZLLk0oZThml3nkevFFsf

FX60cn4A035SVA1fDl5+kNg9xugjpeeInhJtHbSyaj
AHWyFRvb07Jf5F7bSt8M2DjaH57FQ3KOXGduwDGxJd5jaGUqTU6uegyqG8pKfJJc0/wYs/ECZK5FewpB

o0dwy/ejXoUuuqb6cOsm+rKmDYjoeHhx13eOVxXpuhaRs1mIMNw9g24EdIigQGxChXFgfOe/xZB1Xilz

EI1sVaE2Hbyo9hlD6zOKd3/Y3xjfAhxgoH8iTb+
BlwnQD7dCD4il+IXlB+XUH7B4tYdwH+WnhVgGF6rcKjoNVE9bDiz9QmQGfSzCZQx35W7ZFPmemPgfZ8+

J3ahOt0jCS4HhjG4v23DgJvMvovrgZkNyjZuIa2mEo+8BgikfP2Oaav13Sca3nBXRz1mW7ojnBx5k2mH

7CcFpI8DMmjCHG5k/u6sdTCDfP2j33KLTh5+ySdXaT
4GbYRN0JkRxbbmBDgL9MFskT0LTrFalwC8a+LW/rBEra4l9AGVfWCZ0vmh3ybpDDgGWYa0dTF4aTe1Ec

eenkMPaLySEawCpHRy28LA119V3vCUsPuTFjjV2SAhKQ8+cypPkNJmzDH15GBhoAZ++1wLWK15e9Q8ha

7GGgxsGL9HzJxV13ytQxNmxOmMZ16i+qjRNf0eNYDp
mOb6n5jeJ07QOR1oA9LLlWCMjQHgn5vqSPpfTs8BEb3itbv0q45zhSkRimxiZ9iC/WDguX4O/0yizwkx

PIni3Hf2hJqPqgkYyoAyR4G+oUFkIEjVvkOFNQ1WdTAZrX4R1+U6fsxfeDkcMfpV+9p2dcF3+ri6XHQL

fi5rvInbmhIXd5Nz27vHsZIu58WMRb+ABc8Rir+Jbz
86KvW3SI8Y/L2gyPr70/nT+xNV/G6Nr7hTr9H0TyAiB0H6J6hOwsmS6Z0x83piiR6FX+Pkq0fm6jGA83

/uYOoNb78pow9wckXkVEhU38C++jq0hJpGPrpgr0JDUHNqTODse6DkPJYwRTGEepX0rTxFTvKnqCH2Id

MJ2C2nwOOULGq/JLlyE6S/GA7RjC2E0XbMfzns2jRO
7u6XGc6PlPIwkEWmilb6E9eguydsSSXqq/4bYrd1inFdT3Na+S7MVooo6z2Nuo8vGMPC68mmFU9YJRVP

9NW7ulsJ34TFTzD/0pQ+cVYYmxgzfyUOzK+OuJ6GZxHHJebJSSw9Dkch5izbp4Wnxb/Mnagj8SW2idmB

Esn5lmhLLQgQoBliXaCjhYQcL8/2MG5cI82ecYfJD5
0Xp+PRbxckvo03GW1c57SpKTyVOgr7EyzSTDUzTtPpX/WhEUQQtKxP2gFA3lO9Ce2YK5mGZ9nP7buO2k

dzQU1wWKgRouAI/oGHUNae7pGbTKsTUbC5BJb/LhQ9LfQ6tg6VvD7bkjnky9ZrWdFApn8NtCVSeoPD8X

/J0KxKaNqpeyNxti1FWiM9xkpBxX0fVHClCh4+u6T2
gPb3lh/ScDYeHxqDk/peC1qa1MBENs9c5HZmkc3Pm+isNqmILP+Ef9W70fnb4LjrH12Z3bS5zp5jVPMy

QaQg5bv2C8b0TafhqhMmQxxWTlp2aKGuJngxjnQKPre/lP5tkVodKCNidQRqbIPWGja71HCblDjUl99Z

lqLZut1m8QlH4d7h256UoPUKQ43kXSW4gt//mPKouF
RuGomX0RkrkzqeKldIkrUcWZixdeathkUhQK+iAgyVzun6u0sI4REXxAh9/xE8Ru1H1ePbwISItXAbGG

JtX5oi0y9I10kniOhY2nd0anqlkhP98cJ219VA3zaJyLgrpODWu645liY9oY5Jt8jTy4EZgAk5QFKSZQ

yGmqUY0NdGo3Z5gBEoIJRvTdqc2iMNXu4EZ/jLFytn
0m+MFuA5RCuE0xoFUtgE7/k5yvMX2HMkXSa+znJUuulKOBAiHKObyrrJt9E6pD8qFeMZOq0UU7cC/i38

SF86+kmQfZNlF5UESKrJnhuoFIuTxCL/XH71Yq4YlPH28CqhZ2jOzFvTb+SvN1KrYdla5H+0Z+6s0+tb

QFT8EwgGhK3kQsxHcnvR+kCO0wh2M1nfU510YE0+jh
bhYVvzVY9wvHMEsnNcloSZOaHfnZTXUavXN9JBa/ROzQwgljGjMGes3feLvXIR2dDNvXD46YrmkHRL9R

2KGAh0bWww32ivT2xPBiYGep4qanTRsotXiytb/AZ5FCXUPtXXk5Nk9TLnJAUnOjV46rDcYAUT9NQpV8

K0RyGPt1pGBTjzp3U6Mqx4jXM+w+m4Odj9q+COPcwQ
+sXrSnLeumvjXKPuKn7V+tiV7hDGzim/yOYV4jdLGeu0roBPH6pC20cTBgVpYt5aWiH5u6o6FPG40xZp

ZFxOn2q/MfSOR7A4iWHfbc3X1SL/O26esfRe0rZE8ZUHZGYx7thm6Xe7ApqKfy3+cPUx2+4/kQML1LpF

CiQ7J9rUbxP6yWI1LOGZ0ByHnNEQcuOLd5U4096+6d
KEgjZM3awjN41iP47VQPOIMtq2m+bBvWyU0mSAOYd/jFFCa0hYwE6yTJEgw+hKxoghWBp2EsOIcRtP99

VhDbZOb6ZukYrWXYsvm6vg5R0wQQHouWPQ83oGhSavZ102x56hsUOCNkNhFtF0BLf2Cgnd3yQiSwlLqj

ayCLij1lPv0j10w/MqxNARSzRcIUoDyT1MtYRW5OyU
CV1v1tATYHE4DrYlHGV15+O9oluOzcBh46xBW9u5p+dNmvzLX83OI5hPHckrDEkohXLO6a8YnNw/IYuT

yz2YmT+BZ5HVm6jq7TI24JKkU4vcsWkbzpHtpzQ2NFRh3FJJ1TgDNO2MIu/lmJNtrrvo82P6H41bhFim

UPfo4vkKezAHBF03Ds73Nh5nv8HNJz4ywlHiYVbIkt
Plfl1rrGdzzkwjRoTAOi3GDx8mSxNR8u/mX57rL+U9NjWRsxgOQncbFwVmmMyXPnj8IKbajHK3AUcmxq

dXlzypMunxHn5Nkxq1OlkEngJzBiU8LSBL6llsM2c8pmxC1dFT9Ti/gfXLcVsosMb8Rg7cPPCjnXg/er

uraZW+1yWDcRH1Tqwo6RC7/wyVF8TI5n3GDICgaYy3
XpEgDG78C/3KmTAoGs1T1ffg65j4GqwEVzhETRAHcnkButyj8WHQvNW6Yhl18tIMyvasASw5r0RKCbsa

Wp62UTDWsLVPDCDiUWnaXgofNAMBu3EFZbC5S16WJL/R2eQk9NFVzmgD0EzjrhFgc304zWsYQ3rExEwY

lEtJiAFPzERTmbW7fqv2YSZzuWUy39U4rGmG71hJiu
3m2jWbwyEsfN4BUWxfZRwTQrZNEWffj4SGYW5Sl1iQqbbgsFpgij/nHfQPLDaYjYGh8EiJ94et9gtJyX

gntHdgu3XzKD48Czsiby+jVdifecCGHrd9QRjQFMvqLNm0aInu0BWn22bARVdOGtjfSQbkBM17W5tCiW

zU1Fb/eIRp8SynwcHimJoE8CjqS7ZyMuvM7vx3YU0C
aR1IDQIMwqGD+li5FIrOVdfiGSEI3M8E5trSBmhApI7OQUSOsMoTNgU7BKEuLDvhfxnTLrsckWgQAUSP

KYaiKhh9xXlH62+oNnvJDl96N9z77A3AVFD5TKY442lLwllC8Aex+IAOkPGKIpomL4KDFP1ww5y5zBy4

o31GiTdUCSyHaAMO0ITOXBBK3lr5cD0kLQuV/I9HMb
atZubJ7IsR1E+2zhUoJNbUtp6m1RE92fqjWxi33wuOsHGZWhxwjinzMnILvk0JNfgRt2O0xkh9u2F5Wk

NAo9tCjoWR+dDiL6O5QBtt8YpjehQgjveYa66Sn/HI+v/RgNUDZwetJ3jVKpghjRwYjMn+fOuIDr+Qjf

2UVDM0EcybYnufrHqNUbRW4Ku1xPqMl+l9SfTbHZiq
i3RVXCTIkL7HCNuwRIzR05rqkptXvZamcJ+uHDk9JVZT8rjRY7EX3D+CX7y0vdX/Y+altq2Ahj4VBMk5

9qLJ6z6+Tx5+/yJ6gqMrxxFoZ9k4DHxdCoRS8ieZLoeIFGfe7FszRyTFzAib3B456YcRwbhm42x9Mqfy

Hfea1OXVfDObzhY/ybJfVoYkjd3h84Xdyku4/gZrfi
2goIaJVZG/N597I6irT4OcZav++1x7TtR3Ij3acIKpKo52e7n0vKjWjFVg0fiHd5KEJ9sBV/4jlArodV

hte3ridaCEpAsWyN1Incvcgq/iI4B2rOL20icaB5+mshnIGUY7Fi9/nkeBwdWYkZRzZJGq1khsJQ6Ifo

Tramaine/gH7tHHZrCBkl4pyRsyqP5QBwpcGswj1zXOYami
okL0l3j1Ef2slHeJVcqiXzr0dkKR8a8qnVn84fnudJBtbTQ5x+l1lbPmz87HNsQAjgFIPUxa9QUxu4Po

uvKyw1kMCdCOaztZexI1jugUNlN/ZX6Hh7lubx6LmiwD7d55V7/kqZ2XVB5Y8KNGdgeg/k0VzB+ldplt

dD9cTonaTz5XbBDeK3z2jTZ9HB+1jUbMIDuE5Q4KG2
jebP7I3qUl5l4UUSxGB1h18U1jEeinVey3KaoOwHKRW2gtTNJTnBQrwRsv9Vo9Ql3b/RkuG24pRWW/5s

7+9LGIF8+gpVdra4/KSv4bmytJhNUel0v9H2LzKb6p6/fbFRIhBh3G6RjMnftBX9/olpjWJ04XWkSOlt

wOqgZe3VE6Hfr/7VtLTSGLuN4mumciSsrt7A6DTZ0f
B/RpCGPstKzMyD3HG30L8L9vtiBpd7sZn4+QtUxh8dEdWNA35mh1FR0mRcoiim9Glk54r3PSHH7tEVfq

D1A8v7S/vGhOtTNAjgMrGAc6cDqp/P26+tjLMG8w5nMxKB5PHG8W02esH193gsrT+4zngcP8pm6kNhv1

iaXnf8IrV3zc6kNGPjQrjZY6dXjbCJbM/prTzTwjbv
wR+NLJUUWg3awamd3oLc6+NXK/VuJ0RJwIe7/7D5eg63RwyBG8AIsMvxbpB3ChiIpSmH3B1heFT6jRq3

/5ky7lswedZaXv+dLw7DSpnsLmygYxdMT5Gc1kukbDEvYhHHeUMsRgC65zv/l+iNfDLxXtFc/J+hnfwz

nTsaJtUG63EpRh8i03ZG7dfTOfu9rgWmgNcKLIC4wZ
UO/A+w+m7bOTb0O5DKswynkFezrDyOtgXqG9WZ3ON8C/Kh5lD6BdK/bclC/f/jGf6i7xiC8i3z464DSX

4zkWm27TeUiBtGdZfusjei9q31qHK+aJ3m/o1d9hoY5tZqFtJuu+7a8J4ugJZt25YKRrIf878dJX7+/m

nz0nFjRoqXshrSnX8UHcptfxDNA/ecV+htW9sJQ4dz
7R+s+86gkN5QdQSSzc07FruVzLR/R4YJQ99XWr1QpFMe33niPyl97yjSZN/z166CYMKsQX9eg9l/4jLc

ErZad6aTDe8RgxcVWlKpuncAf/yJd1sZ4G1m9x9KFAmOx/3HSEJOfyh4ltFT96uZtvtsI5UMVa/r3zXN

69OwJnfSCmlnXnbRRO5UyTRb+1Qw5Ssl9D5O0Z/nvv
zT5/XCM52uvVcl0pGaSMPyjvL5kqfDJCh9VydrEgIW2aK8JGwVJY+nd/rhJZSNiLXheYKiw42aS4nY0c

jPI1ZPLmOg0jMfRqv87gHFtgAhvgqQ3Vo2ZmKmG9YFjYx3xSV1zlu80Vtu6QRkOG+N29eYB8H1rbdq4b

2C0aOS6VEja08EfE3jwUX9gsQpR2i2BhnL8TbNgPuf
tJ9IgtPQfs3SKauMAumB8RO8KisVNJwWwQeHIFm23oSJ56Ms2CZn8/4yObJZFQTxU7M++rQQQyhEjU1b

6wpg4mx8l1PK1iXrEQ0zezY0hvAKMZ5VlLEOUOEO/aoyP+1msW9X2HkT7oW3UfPE9Q6mihI73C0JdOWF

sB8NB7Oze6Fs070vCkJ0kgY2scd2BhnlSxKHim9SmE
W3ec82veT9kU3DpCyu4V1ZESzlQ7CrbBqY2ccF8ZhTR/wHkfd47kQS7m3X56uFnbA0KLa7D1BYR0AiDS

2nkHpchnZqIYh+z2++ELntK6pCXVilHa0kzZbZvOQDVWWs7ddxLvBpZ3ONBTgI48+VckSU1DNQx16XUy

VZXLCx6EjeY/sGPFo4xsMWUmAKPwrnN6aIZDFgmScU
89RCP/LAQbB+RdKb8Uw8oHHjHWPo0myfsXi7nc5WzcDJiGUExFx44UacpqIsLQ0441zVtIb/xfaJUMb1

t8yTclRHZSD1Is4T2vfrfFA8saG1lgnHhbwpWcIEwRihBEHtD1g62uYAccCbmspi/QM/15pbXzk9naWl

iZjQ2lDnB+Z6pyJCUsKrf65B2TtXueb3rhf4xu+LAp
Ns1oESGceoQ83FBFkfTE4xlhQC0Ax9l+q+09pwwWDcvTMD+0lBjTG+E6rhhkxm3xu6DDCkHWCV0XgXIP

Rs+n0uEVJrnwMvyFm2XqBRRvGEv3a6ZbkZvILM2khE9s9rq/YBb71X6OxadIzWC4Mj84sGpkkPLR/uZ+

OvQU/xUsC47be7BMYdAsZE//6rmwdAX/J6SkrXrr99
6vTGMpeQ1KpOEd7gQ0PFmeqeM7+CzQgayxpS0TRZ3yMe7NPr5MrdeOvdWOXB8LxKM6UjZSi7QIoy/dWP

IdfXgkD1BcTDJEFCA84xG8OEiQZt7in/H8w5tICpgaC9W7o4hlm/8fo7tieMl7+czVxtcJ9p9S8+Gd4b

OMJmUo1R3IutgfJIrcjJvGx4jAF17ZIbwpFJB7Yb25
qJb3Y08WqwjlI2lPM+JvI1du7YWvoxaE1yg86/Qw5nZKRvtfrUWEZln/KfmDYqi7QMBztdV/3MiSpeFF

j0K3ajyp/aGpLqW4YJDmoYDz1khUr433kBUXjFWm8ihAsczkLiB0MHe3JhOd5wy5HH+i4xOyaJiMRDvp

NGpP6P7V965S1a1Yf20KVJLhnNxCqq4Q1FQSzePx1v
rBFtX6KCrtihvpywGJ5OkZbWaRuZPjEe73ReZevQ9UYYln9Bjkqz8DFQxxoBhIkaeDpOa/J9zMTMdlwn

gPZpzwP0a41YAFx8t7T6z3/dJeyKZvzLxPq1RrrqtC+lYbKdSYf2tnJ24Zfl/A59Gh0w4g0dBka27NtL

KwtFzXH7E1OwEhVuIRDw8fbKc1QbpMUbRMfvD0qXrB
10LFznVJe/vBeQ7Wd3m0JLzkhYmVhCQZHJw/sKEEjxUtvMKC6GBig90OMSESLVfu/CT5eMYWMR7X4Cy/

Ttp7ao584xqAsHoEboFe8pYyblx0a9N1Th68PvJiyhYui423+mhb0EMLJYd6ZESuT5UqfV//s40PHSJC

P3XU0d851F6oT/58IohfLBZ72HM5tFyySN8ONJaiAM
asIDR0NIKZldi2+C/q871t9zl9KXuH6JG5kuDFz8OQW1gZvBaEriVuAlxhFsrCkBycmq5ifSZOg1zNRM

V2u6eSQyD616VVHIk1gvmJP9qnaLZwq4YOKJgJP18U/3h4n6R6PuE2ZFRVVUg5q+UlSqOqeJefBHjY8g

LqeayTwjSamVIqkk2vj+8XpxJop/UD+mUVIm4FPbKY
fJaLPvqsqT9SoULxINP8PNtspL/2olAjaE3tYHXRoY1ISYV0/d2txeJ1++keHz/4b5NXYb+1f1e1h2sX

UjLkKAIaUAKCsdnwcu6b3K96IlMb6AqcPxmkG2IG84EH5dKCXw1jQXt0Fs43m406Ndh7p9/42YpdOJcM

/EJ3I+lTtQIi6qtOzUjjnG8eRF58QGiSDriewp32KO
4Dz3W8vxUjrI1xI5HxYV5rtNOOchrWTNpp/SRlcDwTjyDDGLv5fg3DJ0SOphCoDW/0Lkn5msHAyX0EKn

LHUisEf8yjqooMu0nFbxXj6Buc6G8GaBrQTvQ0VuFjzYt9HIZegSjdfZj4U+Q7npXKPI4/ntANorc1Q0

L1Be1YI4JyTvZtvKFjv/9JzonuvhW5jsMH6jS0cXsk
ce80nSKhQq8t51Pwl6rqfrNtaAjVefSwRgRTYB8E9jDf47EmZRCP/uYLk7MP9ew5pK4vgwsWktjgbflk

fJ0pDgrHsoQ3UvhVO8kzDpDKCoBcj6eCXPYCXYnvx0l1AxUiL4//XjhGQHGQ/ddqMwnBK8FtYBeH6QLX

PhGDW8ZJFSv7ilqnzjngUUlxuKyl63oRcU+v072ibE
n4oN2w2CC4ke/lAk7I5DMWRPThlsDU8ptczn8XN8XzIpJbmi/VdgX9JrTVt7kOWTyN9gKD4x2Cs080sn

4pAmgZmn//LtLFSKG6EpYkPxqV4w/xPUjz6/ORUxUhEQfwnZonGToC3JXgtEz4VmJjjxjMhmlo/VV1br

K0OtTMDGUeDHk930wm1GX2Hz91s988FQf+57540pQV
qNabJoTkrsMMyGWqM1LOWPWcvu9906bxDTkQ0pDcPBs6OyBfOJpTykld86KyLSa0KsrwTevMLxvi/PUr

70pD8gd0qIuqK90k571bDM7hwuE5Z7MRXV2W0h4Eo6FI9vtWgWDr+deaP0NVD7/upBxlNdfpDRMLA/OR

DDIC4RM53XwWYm3oIFSqj7nTgW6XpqA8E7wW+1XwYV
Oip49bf7bcTy4A6NeBeCzrx3ztBjQgMAhON7wwe+fT/ZdfKB6VkSONBQCeMSV+0NdId1h5e2nQEfxeyv

dYLbvRVOmuPSLTWJ8hwXFI0h5i0z435AxtXiJ+l+9EXY1Ny9pz6hB8VqUmSAejPZ/ZAIK41i3UcY/1VH

xyDdGMCvltPziIXoQ+zmtelA3dYwsHwFe5cxkpgi2A
tQ9kEaxKpDPc8F9vhjZQXnZwXUxHebD7Ue/x//eyOv6EmK9ZaSQhQEoBrb8JYIZ+uOdPbt9ACFAtDiEy

3y9Gzx+ark0e9V5QNor3c+ZiozgsK6aBj119PUC+W4VMGBRbFTy20lVMunUZAug0Or2CAgyCtbv3ZVwC

IWGMpOLnbLMCP9dLdZ9PJKoRCaSt7WbHxJkgN5egIk
EN0I2g7MZ09gC6Z8ycE8LqjP3d72P8uzTVBo+OpQyyk9meTCcf4+HxSqpbWHyC6OF8tzhpD/V36WlZpD

5yjCgWi4aek67qlsUEiGZiZMm6jUGwFhPzWHP4OJosOCwBA27M/MNWUr4r12nAxOrfkxptltg984kSQD

1ZnUJXruen8oquo2Nx7GOubEZLTr6/NaSZecTdBRph
1BCykJhe9SkzcVjGT2o0fxGyc+mTcitqhgq4hp+gbiO+pEikptF+IC9QAyCsP7SY7Ypt++Hec3/UhZ+h

OjG8xToIgkY9I7+4CYHSUsmjC7yDEohqB7i/UbmvThmNUYO/cbvWt/bbU+ouewctuhecPjjw09AyrYnR

TtFosYCfjwUdB5yGMxY9X+yziuDCgQ5bvf24eqGMvk
eCTWlu5gAmpSN8aDY0LfH4an9S/5ZQo3cGN/pdBoqpo9oZcBzdxigl7i0fqyDFo7jqogWuYW8E9oNnG2

O4EuLETJ9Q2dcwj3bWPXV3swsFQcoPJVrV99XQtnzgJJaKdaDIeichTR+wQb4O/NXbuEP5aL+zF4K4Df

VBKVM68n/Qb4Sn4ip/WqrrlUCpT/34kc/t8VtWSLBd
59Ma6uzr0cn8plRD+L5D2UiZTjTG5F5rkxWGch051wzcO6dqBXbRJGekH5HiNW8C+whWePhL7I0EWwwZ

ySthY59dcu/LHpu1H3DMHhlSs5IK6Av3dlPyNrMIUkgrUggk6cdb8p1V+0Tz+Xj43sOqCcCSBVJmTzAi

C/y6XUeTOGfg/8+fz8zZd2cvgLXDyK6ktgKZNYpeRn
cwcyI6SbIKfN7jnyEEc9Ac2W+VSxl+wgsUIqbsf/KnJh/ZWEbocCevLr1XZ2JX1Dy5+NCPJHjWpeyfAQ

u5eJlRtvuZxMJ2YjEncXsrR1qoqWTwraRmb87qpGP5I81CZDnSfdh5DeZ/VUW/OPnM9puGF5OFtZXT9o

aGyWRQdvCyebJon2YiRDARNFfIAvxIExpNptypHHvV
HB15oSNUSp0D4PTyRVAWzjV8T3f3B6SyNKsi2AusqG2sqkurVEiPAPVHn+d+HPdGph7YvtB5HpHhiGQa

zQXFr2qxEa+F2/95NK6+1o4KfPgyFctrNA/KP1V94oa6mdxwDuYrJtdfQlPfW7vIJXdeZpAynqLBAT0M

JCWJEO8dBvRt7KfjPrFyPhAH0AH2AZUCWPEJiaLL1V
8oZPvS75lyXTiS7bEiv8HNcskrwhBgVAxQGU5RsIatagCc+HWZOqf/nMDUB27Twwnh81K1w4XMCJLPPn

0kdO6F3MEARJGn46Jtel/arrdDnL/WecDv7yOez9VDWh38X24MzJb52LGZIR+f0IDnE/MP6DtjwEb+oW

U8CHc3bYKiOr/f57k4BqR9BeAFDIMTmPFz+tx3+Ako
wBUzL9CtUJn83SDz26QLf+VE/FukHy/gvpaW4UQHLdo7Iuv31CTZcCL5n4F5Goeg4yiBFnV7tlNP8Vn5

9qZwIC2GA9eoQa0pj04oqp6SLypyTkYlsTVNqmAKrMBpzl3Cu9uotuS1XmT32BCdpD16f9EGP1KbnOEV

0X+Ng6KtCR85S6puXSMZUBVFBGywjWUdaaYmHQjtF0
z0CQeiqkarvtJ9iknwp9Ged9bDTfemMEt2vpizyvzf8mPPH44WekqMFaBAXDitKnDUkMsf/K/NHsYgpe

yJdxt4zZ1itx7eSg+w1yPntNqSAdPSLl8P7GI70HdlPETSwpxuVXx5GBxVv1k1Kua4QbhVJdRRoXkPr9

4j4BHoIitQfPKIDAb3WlPJNkrpW3OfgIH1n+TqD88z
qOf7efGBO7yZCvu5Kx55hQJp4SNo1B8RoD1zNQkmzt/qE7Gtf7NUcQOMCJsMCvCkB4BS0hJwBLhb+DoO

Mcji9UAS3MxUYt3gfjLsDlWvo14poEiGawg81p9vgIZJg1BJvuopKsVqQOmj12PuYN622j4te3rRS4GF

/yKWQ6qZ/U0zrPuQcPe14OhGh5E3L8Rbhof+PzAAjv
3wdU3PHGNn/lxpID80r3DSmLU9WakI/+MYg9wegd8hAwqwmh5SxKCpTHwc9/Yf2P//iP//iP//iP//iP

//iP//iP//iP//iP//i/qEitckEt2GZEe8RrBG/9DVL4JA0rJ3Smw6P1zIijzRmechzjTCU8fgGSRwGV

M0IF4ityzPVIz8jWofbLET+Mto2hiox8CBr7USu1a6
kDQOUF2sGg6dNcSwDigpX9/mHZyZMgxcjSvMGzSayf78Jenvv8q4dRrSsK6kEx23I/5CVl0eajMaD3tL

BHnWsbIteLLy5a6BO9pla4BQVhbdH3RbsFwsSEUKdjX8WZ1bm9FU+wQUppRGjL0cCGWZa7blD9Kex5+G

ZUftWfeKFqJvEgObhMUU9jrUXrAmQpMjhuQkIdpQbK
1uP+YJEOjzwctFatkIfoNQR91hvsMaDesPLflcyT3IIqDpcDr6+uptMwBmh3yKcXUIjceSiSoo4RbpNQ

y82ltTwDoIdilbiNskeS6o60CVkGWQ/oVarZaYO6liiaw/KW+nEd3BAhruqbo3sNtLp4afxk8NSns18S

mhhfgqkAog7/xy//6J9kP/XAywmano5tbt+sv17roQ
zP0bR6DU01LOI5ol+gLZWAzPal6UWT5GukFZdnRLJhu2tpbzI1pcBzimHOx7cMiGHR3Osr1dum3GfwxI

yQ9XC7Lut6QrkRH74if8z0cpXuELiRB6O+YBNfA1REDthcLMPzscv3YWoe02NwLdqkloCkwR6mMbBna1

95OB0TozIeAov6+tLj8I0wIBVy5e0C72tPAf6BuHYj
5g6a5kn3gyM17OxqrdeZJlVveVOm33VgU4ntdJMUN0XIPtZQj7ZRlfmonFvVXydGPJpIo7j4OcFqp7H6

KD/HLN3ykp2+vTfadYEFGzklBk+9J5CLt+c32pTQQMQTm2AoN0ARitfbsvTfW50jKkm33r3JiyArfW10

JB77WTogLIlJx/MO/rgl+GaLEA2S06XHVPVmdQplMR
3JtsmfEYwmE5s0IrNUqJWK4iao7ZA6DW/bPBAV8Z4qqlxxjdtIkqtVt4VOq/xYaBT74k8hMjvzdAwqvf

ZaBEI0tMdnDHSivIYekW6OFNmcJv0N+ik2XuG196iPmw2WWkl5lQy95nzHDfYnxPe/Sr6iGi9s5jqrTW

pidRKkSuGyAwjdeVKWrdiTmyV5IKUOoEPWB9rrWqdp
dM4NIijE0re8gx1DEeSIuIAH1es80/YOIN2cAaWH/rK7v9vx7flNfZP3lE+/b+qLEMnK4Iq4wfK3uf0j

eBOb+gE3gvMSzDvNephgHppMm1jWujDPpTeUD8sx7fTb2/nN5vsnuQHM2p910ho1Y+t6SqdoRGdvfrg4

b35v2Nq9h+AV0McpRMJRXp5jVfeZNhw2Yo7gyS+W/S
jWUA6FMnT7VctvcJLqU2xhPBYn/shoGTfjAfCi++xOXQmKqUbgZ/7Id5I/JwwI0SB+RSx7NgoUNtttI8

TzYszsdCdRkJfCzFh/JsA9AOw5rptY3I86D+DQVHFl85u+K+66Rc37oYsFmCgqS4KGQaxHbSfJk59VeG

EaHXUPJDphG3CoQjEeeQlWQ6iJUWKT7ltstqj8rRQb
xr9dHCeYMyf+53d4ww0WIkOyXluh8EmIPDNNt5w/VK7uBJhygQlOCJFcU6eTF2MLm9/YVOYkJQhbMoKh

MF/nI/4MxnthBfls52JEVTtbETypIpL3DO/a46hF8ceJL805h8STxtxEMsQtk0ILCXibek0rMPNsXcGp

fw1nEh8l798DZqhu6wi24Mi97gRyswfY769Fw63p+0
ZGSdCE2iwWI9/eZ21F93lISCoCFZVYJhKH+jGaSOzNNO4P4BPly6o4EE+D7iqREfFdPflx28LER1CeNb

j1VUYZR6kLgp8FG7apSPY3dS6eflDdRslJ4TRpFCKMmM2NPSPePcdcsrHVHxBotyKaB9gM9C0Yqe3Ot8

+0UAETcJjCzcacz0EcccIWY9Fg1hAhaT5Mfs50kDmL
lq3heuzQ3Z880Qss083WSNjX9+O00HdSVaQtfubtG/geuMMF4SheTfEwpp3aWudSLsgxHBABehG9C1tv

XhLCLufhyjtBdTJXRJc8udT3Tc2tM9GsGO64w2M1TiAYJla5/tIHiNePiqEM56yRiTiH3z6b7brs91kx

9EDMMLVm1/fW0z+a8yxaFDcgD1eJGmWI1lZTJ4GIwo
4yJgrZ0N8+02iV/fHkLcmGb7wkU3zJsQ4O7zAOSBM4TmoVhuiJwEA9JB+4p2X9WTau+2YR6c+tTV3uyW

H1SqIsF5pcQ1nPmg5VSkzNHs43WjXB3NtnV9KWs19iNUOwaOmk6H6yutwCTuSTCVixwMFvqR/lgkFMyE

l0X67e7rBcwdkk9e9kfFjfIA21ZymriLXRQu9ot5Yf
Ej8DJjE1n+ewpGaYQQ1h+dgFhqM3YDaKC+FeZzFctywcG544ZWVmO+kN3JDqw7+sTctydOcmSraRUYWk

MFEF0ABwMJFbBKTtxCNC6VAS+zQtjwraKHwkZCB2vtwRXpV7cXmOrYN97Dh0XfamZebT2JHEfiV4Di2o

Hq49x1tsBbGWY1fET3GESYWK4kJ/cPJeGPP4Wpdho/
g/jRq17KBl0gM3Iq5QdM52woFDvyk60DkUNdHoOCmUqxdfYX+PI0VZ/urzMGYHnOlQ3IR01K+MsPpoDm

pZuKeV8EVovvRRNxYrdYb3tdkOOpkVQJA2YW096eKFgPhxzmxBlvp8vbipAQ/qnbOE0M+7yso0e0lFY0

pkU4NuLhSwmqP4YJtCGmqbvjC0yeZpJTVMfYjJ212a
FGqhWjg400CcW5k8XsjJ3wR4FHR9PE5M/B/tW+c/UakYcqsKl9AScFQAzMds6x6JJf7To3cA9xzgSyBg

3s9qzd2gEjbkTbQ2lNQwJyKVlXC6ZOSsCjn/8H5/3/f5cH0+5/mPToa1pkyTLlO5gLZ0xq+ab5zsWRnQ

pUqKCaLrm/VwW4CIuuBZVV0qdQk8ljpk0v8caG3k1w
lrB7/D03zk00UqI7OLLZGMlt4dBMJl9xEuXKDC0urV5Fr5DXsL7SVIGA2fVFIGUvVP2kcWd5ZSzezTLl

n89Deo4jH0KHLuxoV3KhwW9OKlzcoQsh+UMlN8iMH8h3AzOf+JM4oumu5ifepr7QFPE7uOMOgLhtS5xQ

Tfk8uOV3pKh+TvJ3AHuwbtDunY6VirwIWmqa5yOsX6
1yo9TRoXp9CKMLExdKbl6zg8Yg7ikQl+Tj1+gazOpMRRIgpwk2RJey4bQjc2i0HoVzUTZo5moH4LqaCj

KMa5KEFllPbEfHVQIAU+v03U3H06d+mCdauMidN3iCC0dwQ73NEJ6lOwfuKe7GLbtbW9csXNXIpVnSBJ

z06LjzNJg4tOxPjO0Mb0gaUppCmzbSx/zPbejAzwtb
rOkqg4k4QSB9z42Q62AS3zVMxbwzuuCusnK83sbcCIKKptYXkQan5Opgtj8bre8vixJ0g1RvyaHSdr6a

hGN8JeOK9+Wu33NdiU6CbN8NyIyGPjAsGNV21n168qK8RnOPugBZaJ9hwQFUXwCy5SFig4zBcTvtyKhF

lI3+Tr6YnQxFhPN0BzJVJbKG/rOH6L0SxUTV+W7waS
fKVGQwqZxzzwuO5ZrIZokpw9PxC/6jt7fSj3nsFRlWFiTTDIm8x1WOxCFotAEfTBgDSM2iSU7a+fgVp9

//Al5V9/GKLJ2F2LnkELoAe9Sx/AJobvoL6IDzfj+aIbJ1olk4WPOnFZ/zj15HXMOhY6QmMJB4zlWwyf

ZgAMzc9oldpruTf09Ix4U/9rO7KUajQI47r8E8V5H/
8tNnS3mJpIBvg+LzRt5S8jzV155uBnJFS/eFA2mnQrOJThEh/4bIPSfDuakjJyU3otT6pSJJ+S4IKu/d

E88hf0Bz+4SmJol+MaxEomvSKz0T5peGTfEWAUUaOOW9DODF99Hiw7ebNLG0uziOcnQh73TzaVSyiSaV

aOVnZ9/YS8gsRvrSJbbVVX7uQGyXEnv2E4I7Je6lD5
QdLFgoje0yOrsP6W+j+KI3gWuK01X6QrnrkzFcdpDVHxddTE70tfNCSn7OJLvM0mhVPAbXDFC1p5gSrR

k6ogM38J4qQce9yqIARb5IIwWYlZqzPj5ibj+BxCoOfLSjmGfIM8ci6MdTshZqab5sdVyPg26V94ZVx1

w0DbaYd8YhhA/w2t2r4tfe0WWg4iUBWX2TbP7XvroR
pVpPsHv/11iHa1XjSoJy2JpvID7SkbFx+DQPiu15lJ4gduAa9Wib3zioJfhppUNvBgAl46hQZouEL6bp

v5igP8GWv7zlFXNA3bvFf9mb+j9rhyl7o7cDzbypYE++v7qeAoCDawK13vlTDtjNRo7jLHIzghP83i

24V9nnEdF7Mn/oNM2t8Lol+f40V0SEpTdXxEbZ4EVB
qf2CY8UVNy56Z1n0iWjr3B6hDA+4mEYnkN5J4DLTodxWW3ATmj26NIjxfaQdEC/Ko/F2vvhZabkDjbMP

pAR9NAD93eKVjSJ7hNMNpE18zVR5n1GgNCqyszsb1IG758b1K/8oKizcoxUASncLDGciuUFfPf3b7kdy

CBD3hBVVaFIJC21jP05nyC/vdXhma3Ym5Ilm+fMOz/
HkOK0LIZUiG1NmcpF0wOkjLxmrNL83LoQu7LIzsjmyhi5eYz/K+hPbdBnXHB0vOMLnQtS0VPGE+OFf/Z

MKGB5q1FLPuQwpRQ3Kl6BWZHrY9DioFZfcG4xXhjdpCNk5uBageJXnSSs30BsUN/dvzQ5t4uO7PX+t6t

tGOObXL6ENgxKw4NBAlzTg0nMdT099nYkr+TNTyTND
DVzNqy1sYzUIXYcQ6gCmEsgd0O+aJMZQgDhcsSbnMCdlKM+B1t8MmhHJthmYMS2yrpbsPjDQWWMOgnkf

qfnrVc24VTfKZOlbuEtbyMmva+ViQ1jP8kI5JsQPBrhlBM39rah8Msj7jbbHG+S9xiA8j4XkVb0BWxyr

esnQIM5AjRfsgp/NQDMwg1zoEvWE0IWrCB8vdyjPai
1dO+gva6ycKNtZpIjFhdwXyUnaMQXju1mtB8CfeUYMR0QSY7lgVLzt8U/9xIOF3m15QrVvM0QWDZarDJ

1Hx0E9EYT+cucbhVcARkzK66p3K25tStc4mfCnsNVqSXKK4bCjyAA4KnT2OkyudIEzTPujBFj6bQhls4

QLQJI/dcMqkC4a1ruIWU9KC3LdfFqenJqUY14KK+Om
zeavWoUxpjC3Z9ueypaVdZ+1M0TIUvfBz+9w8T7KqiFnt5zXCUdeOdE4hji/Vu7v6UIH0/eaqPHn+PCA

G+1d3U/p4YNXK9bNw5QCzx4LDE9+SoVJ7Msp7OiW5Gal9s1+CP+akl0n99sXEbWK32a1/HfjeBRcNeQ6

cn51LgHaFvurr2//Zu1ztJbVG/mqNWc1FxgCbmm1jc
XpsgvR2okg08mt8/B2vHETX1ZRGuAHmVdt4RP1Ji9B9aCLXx6dLe9poOsspijNymjmDvfkGTckfxBj06

EQoi9i9PX7OXP5ExLqzKZoOLYshT730HySsNIXg5IWDFeuWzVYBrz6fMxq5dM8Y0wAbRvbeqm+VEsu0i

4pACvSU5PlR8VX4KzJOBGwXx+UQSTWyJ26GOOd13Jh
iJmRra87aAW7uOndx81ZaPTztwG/hTREtMPqyUdHa/5BmzxQMZt2e31XS0XcCQ+LjFsJ8dgKCM6055Df

aPVNnOWCt552BgKY84+4aiYPqsl9QbqSriRW4UgFVs0vG/COTRQej9oq9r53XthICVg4iY3YZ248S6jO

5Of4EkH4O/R+ZHGOf/D70sySoTi8GKy+JEJfUMHyzD
McRhnBsggSKa2Qf9YVGWNVt21koZfG3D7xseTi0jivr8yFhuiwlba3D5achW7AgEdLnYyXvBbutuxyTZ

a2cVwfvnaEiDmpC6slIy6x8n8Fh9bN/SOAcJ26AcPYrEfnGI3+V0Qm9mCyRM/rqR7rBnyQkFEY6oVz+B

7nVk5mILRwY6SwmkDSFkJbXXfV8b/WHSmSL1VQ1bw6
Kj1Ea1XmbuVkd9vnH4qj61Rt/Jm9g2a56GxxmlRTL95ZR9fvJX05tzEhvrGuZfC95nWN8eWgr4clLFb+

lfpfpRswccyYr9Jr3CKUIosc7kG/ondft9Lvl9+AJ4uUZcup5BIzUo4WeESZZKH/iWiegZgcF1FHWxVr

NzASGZXsPkzLdvstX8Cq12iGiU2LK0YhtISKlIoQoJ
NLS+vCZl7K2jEsXRIN8pSaQhEh5ok/Fkb2eW6qj8qDz57bp9dvzdBfOpOh1xvASgvp61fbbQaJfyM6Cp

/Atk8PaV/1+TDBcSVzcuikRLYKyP5M9zmaCG9q/wWG7yMWRPiJYN5C+5MpsYVZOH73DhJ1Pi/bmiEMbm

j0a+KsmEdwHvgwDJl3DAU2AuWalbA5B+A92gfR6S5r
9SuRqDHMC4yHe2LrjEY0wJWPXak+V/A6PXgd6z+AVRzgs+iRK3X6Ku9i7RNfOEhyeYi/EVBw+87A0hKX

p0PKlLeAJkIouZ/LxjL4gPILykSUWFjKwqABQ1ZPPmOwzsa7nvOp5KOqJm1z6e2TUyZPYkWB5SVt8e7F

D+p+NxGg9k+Bn8PjdvDCXzmudlFID95rxrwVDfhK1v
2tNDB7beHvWeofpWZWJOGEgyzu9aURuzbOoXQhVSabZdCuNIseLFWSOmUymtdL1SEPVREa/A3vzkho5t

ZsRaXUjS5VBuoJEmsSEs3pMOcknr7mtXJ3DswrMWiCTAY/WdcoxxVbYyT8XpiKohZN8zDST8KlcMji76

nzAFJyxDzh4xg5KfzEGi263ctxuoeFfLc2Wm5JuN9/
9Tn7us0bfitgA6Pxx4xXn5kgBbfrHNkGSTc64kkp/hFdflH6sjvGlG9prWkhcB8gxv35v5veF/PKtrpy

Jm87wbvsXh+TDLyOtiboGWgkwJRolScN3surMni3dVmO3a3JIgJeys2cjZk7yX11ftWQdNylKjWLuPQF

KIU4M/Q/XsJXCtgTKv5yW15os10DcEje1Nkw8BsFPR
AWvpQGYe/iqKm/pxQm1jk9ZRCjO1Vrr59x/gkyc6UgKXWQVwS2pyPxiIdGw0k3AZTyK/hbUEhEgzW9iQ

fGchIAsT4iUUsgAbJXda8PzV2Adcq7wWCS3drD7YIitTMV63wkIht28jn0VdNi2+5NN3F5FIo2H+Ji1u

tXdYv35nGVRxc1fbeueGXvZgdvfqxD5i8q3gX2Xz7r
UeFXKCZgbCflyx/Mandie/4Bp6y24nm8/lSMcyFjl09TiHiJR/OLrktf+JEAF1GXiTo7pWiqm8guMzO5CdD

UU9SQxm+vHzcxDMIQQ1P38avlMfgPDwe3VXdBzyAmekCRdhAVTKt+t0RfNh8q9Wx+nh6cPwtYB17y7GF

ICC7Oa9hjbezfibv2pwlci5Vsais+I6ErtiQHt/m9t
Htjh7m0nHKR4FdUWjeHeESqzixm1+EsWSmQ1WM4eEr1pNDDHQ67m1v3tXJKMAQGLjiUi/A9P8Ye4cGbN

3VPz73HmEQjbAdshFMRgsULLvwZL9/GlEXSvIjOsZDkueBDkvUemsDluPtUoGCfiCHIAYiMxVHz6Jhlp

VQhZ4ERTpI43MSb5zyts1dElLW79zkb1fEGFVl/xnr
3jpC3GPFj4z7GEnCeqwnH6gb9wAF2LIVdBNamb2lVGx6AFIY8k3W7px22KYXoL8p9hfglIPOJd0gwP2n

coMcstjMsSFh+XJy1qOjHDEYnvwHaa4vkNR1Tv6hY2p91ga5upLNEQ3Yo41JOdX3fE/fcylxnD0hhWrG

oCJdJ8xfDO1cgSdaKthwK61eot0DZ6PttTNhNMqMXb
6W3imZx8RkoLDKEbx9M/4J5Gpwu5Klx7KcLh2sCdzQgTlpRxy3suA68FCehQ935OZd0/PYYC0CYpLtml

cX09QVNtgebFG8Z6EOFylt9i2jVEugDZxt+2Hn8S+41bET0ti3VOeTgJxdl9SP8i7eF9DXdhHImB+ddY

JjK6byyAPG7tGY99qZgKIx6Gh0S8x2UkVZtcJibRkk
jSUtN/3KkvA2EkDiCnN6b611BU7qzUZzRAwcjrZXcEaa7chpmk5SjBtbcUaw0GrSI6P08+4cuiZ2Gw01

kbLE/Je6OzUO5+XlAtVJLftcmwIYr6wCqT6OOSUcpr3RpRmbNodD/RxB9nnPlKOUE9aJqIB5zlAzqmHk

GK9uWe+LELFYfSZzQCIuXxyY1daPUsT5OY8b+6JlVr
spSoWX27qX6Cfl4/fmaNLzARRNTVNV7cImn+OmxCExOrtGqoKcX1C5mVf19hRj7CBmEBhvM16iI1g/CO

PkxLu1laK3d0W/8UIY4pO054sK6O9X7/P33n0Z7pyt5XwpZhUHrS+CYByBIRxBsmCqluIG7aU2+E+IyJ

Txsj8ju5od82DhrS6/+lgAyAXjN3M3sX4mNSxJ90Vd
mMP0ysB+tjQayKMDsu93ara5xBVZCLJ+ShqeiJMRkyMEz9pa9A16lekFvXkwybBco7zmWQnpTW3jIzDA

Sw++LUmSJRjrinR3S60N8VJdLkW9QqDuFHnd/4cXYPTrSSq7D6BoNgwzvA2LdIYeukLv/GzbNsdqFhkl

iuF2ihmyAJ70R2PdRWlxrHfbuZFXS9uUxJUCUr7l2G
k26h89g8/ztn2vaU959+2zUF+lwQGF2S+j8//TxYf8fyzAhV64h9/K+onP+wEFrTeS6kdrRail3H+i9R

qqEMTMDNBiPnF/TtH1uHaqW9TN3VLLC2w6rU7rv7LgH6BdB6CQnw8bGjYkQ2o9YdUp7FDR127xZ/kSXi

h2uNrUQH4IcUnSrIZ1SLPA1oePi9lBqGa3+l4L35Bi
nNm0LE5DjlWkgvwiOExwguovtA+GWTQfiTaEx8F2D/nxIrOObZYVYoIkphjeMrCveZ/z3GoL5Waxb7BL

ew41OmJ/8V/8PwTVxc//IWvH4FQGLmJeBRB7bsRfcT8dibKxZwsNDASuQZXhDmlNBIgpPSafVNDlSDWf

DD9LSAKfWMYcyWLqUOWzKCOuRkC4VDZfU2Vvt012nk
EiooN6VG3TF8LiAJL0MXu7G4ymTvZoVMHsCJJDHz9+Rk1VMGCpkeEzVoHdIQNjJ43hqAWvsWWfBsW4XT

AgUl0+Pj4hujImAnjJEKBrWPZiNbrZXDnhASBqK1BoSFCoIu0kfBYoYT9NIdYTAgNgXWNcPKR2QEIiVM

SfSQTdIu6ApQnbLUWeSfYZPqWqYGR8sdOqkZ6cfeEv
VhwQAJQfFYBrJxqLYByeGHPfT1YpGHX2QBS3Oh5QAC7qYU2VdSn6N9L5MsL2nXXgD0baLKbkK9S9hVYp

G4wEAfnaA0OgEooecDPsZNrxEigxqCVFTRDaEMCsU1NUDKKiY82wIM0fR51ii7WNzQXtXTEkXKW1bIJu

YhkAC1lyDXUrOTP0IaMjLKWkY4p6PKH7FNZqKee5q3
WeuqVqeHZgmkJdpMF1Qs5scERlLT1ILtoq5AvJPKwRodcwSUdi6H0ZOmBseoFTEwBp6khJMxM9vktaBo

DCYqgILzE69r1eKX0wgZX8C3mbipzbB4xaNsqV3AIV3Wf70tn6/st3qS5uj400t6++er+q+uvvm+838w

XpYQ6YFvQOSugMUFSu4aZBOmVZGdIX0hPRx6Rt6fJx
MdIxMNxj4+h6c8XDahMJMy0R+2NMWYEMhNCMPzRKrz1WFFj2ViLs8AVSHPuowVfcfH9e0Iu7Q710HROU

COPT8fClZxueKobHZhtAWK+TYMCXsQn91bB2HdjDHXjgeDphZb5+wW0B+V3XHJaLtMtMoJdJDHe8e/1v

7lUtKPJG3ltf7cZjiZRapQBBpN4FeAW0UUMJaR5CJJ
N+3LrFf63VCAZLt46WelM8VPFKVUsRAlhlxyQVavf7Ol7jSX8AJwWqd9oAr0fthBlr6V66dASqQiKfXn

HYsDVRwVgsUnIS14/ZmDciANmhIbm5GX3CM7+rk3bd8nub9e4zQtreWr+YnJqe+Y58e8n9/jAvz5z7Iy

y6hJsjkgTXyU8ny46NMDbZ/3r9d+0ivbXrDhYWJhbw
L5rn8qn0WvILS/u+IY1ShqkiqAl1z8MxPOlDAqrKuEbdjqsZpkwuW/dSxCSe1IjjREryBo+YYcH/U5b9

N8P+p7wmBUPLWZzvd6wEwDw5wcMAG6C/C3/hL/yFv/AX/sJf+A+r4LpgsZTvzhqJcyl3eHHmq3iMHDOc

zbRPMEQOkaw6BehY4s2b3orROJBFFgpOZ5GafuBan9
TG3WjWgBWRqddekYMH+R+JFeCzo23irLQJ98I1nAl7mrZyx8oeAvDQhrpXPEkpQAHDkXv/JrEAFU1Xpb

3kDaCLbdquxLvbu/rajEkJ4CF2YmuSNxzMRMjKp8BvKSCrg9XS7kBzuXub5RXdElizHvghh4HZTzBY0h

Az5X79e1C425eoC6KB7aLvHDg3W/Y1jZm6q/hLxVgd
6vTJNK3fvAqN/riTbcCkt981/ynaonW8WZzf/h5fcDwb+2zCaGaMYr8bV9mVwiL+aPrsPoxPFE+sK0Ab

2E4ybOjlP7kM7HyQQgujiVlJW2/G4HrwWv5QTvCqihMR7/NCRSxR0xumOoUceKGwzCWLNSqbD9jVIcYs

ISS42lE0hU5XIoQ/DT7mnoQAERGZ5mTWs1RzWFJhQG
j8IFz93gDGVZPHL+UzUHssFaZvgwYOMBvF6QtZF9Bj2KJqBkCCJPAAxouRTnh3322FaTNnvkHmY7Fp+n

Rcf5xe/1A1eCVgaW75AQIwaYoctK7KPp2B/azLK14zPNPDdXElJDdK8RlrHTocYay2Xr2RJ5HGVbiRFx

USuK25XkE5aKjCdSVxRbrvDl10u2gUyi8Q07TKJRd3
VAgiCsigsKUTh2h/CUiG8ljnl4ksTRI2Le/hzhcqNsI9abDrH9EBdNsINGbUyIDdYpnF+38EISNtmhwj

Calccp3PKQHHrLyI7/pdsBKHcvOTnlTnrMSSKjCJ+gNxWnSzp1izAhBQpZkjg46BVkXT24fFaGqEnIe2

AGgRJL/TOcAlMDdWVxKrkGQvby9WgYmltg0G6USmIK
PCqvucFPCHSiVQERclE8x2/AYfWlXxYsB5fhbzLmpr9Gy7VDjFVJPKo8Djll4Zrdu03kLUDBxmTCzJTW

sPSXIWqtXpZMcW+SvHo4w4LEpybVp/AZYX4bBHWcg3PVGjXrK8qwG0eqhhZUUVyAQqTVv7guK17Vqoeo

uhizHs2xEP07ZxfPb+g1Ha9ZbHPB+aLL/AR+YW5ozr
Wo0KxutWD2C0qQ/lO1Q7IA+LoRjS9KbOTP+hzinT8PR3lFBzY669z06OhP3twLsbjLiYT8Lold4j+izm

nAr2Bd1KOZDTzc+YgqpKkVjkwsYrklv9Sn65SSpXo2J7XM5K/JW6jeDb+hlitkvrlxqNnBmRZXWrLZX/

QWcvakdz9jXUD4pXiM+vP2wogxEPcXyggab6Gf5p5D
F5m0xpiNVhhEh5Jphv8DYCtV+Qu2c9x/uRlDmm8vCACNnqKUXOASr3uCtGJ5bqkzc+AyCVEaUIeNAyvQ

0msGg70MHGpaA9EBOIn0xI7Zr2bmbNWR9Fptm2esnteet+tYk8i2/kx/I/dK/wKnsu4OjdEjUW8TEruC

osCbkqwfio3fbPyN1RuOFzDW+gmBcXMOkd6m6ZpGri
ACubZT9ccMxDpC5AvZ7j/dc/bEiSCfOwa6lMWpWNKhedndcIqNlusB95cO1Yk9vCfoLM6raY5WxJB8V7

5RSU+gIKpdGy4To8Rj7ZQJBePmv0ZBG0OpUOK6Jcyd1bniPMhmwWQmjumEOQ6k8deIywvZYAG98wZtkO

tthKYtk48zJcEfDjNSlTjOCTRg8N2JjPtGyMkevzde
ESfZe5zl1WUnr0ip/UyofcwzlIkYLWqp0xcnrsJ5BbxfoUO+XZXXlkufXBuyURy9xLjcF5VcW3kjVCuG

6PRBbPhYqLdsnTny7QO+QwUHG+IkE580fz3LpYaK+bdseBCVZne3s0a8z/jT45kvPSOxx72wzT1Ip1aG

wRPcI/Z5HNLhwOms4LhxBGz/GDiTIMHv5c5ARupvEF
+sH4Qh0OkMGg/4ZchdkTi8Sclchafa7xMABjeoNSmwCFkwZFRmrw560/s2J+b7gF5N41pdq7nS0WIbnk

op6nWLyNTuSk5Nn4FEKqceWR9GWPQSwvn/SdFqwzVqtlUIHB9TLxcsaSNl9u5Q/VSxRgKL8xLVkWy5Tj

kkROT3mX6lBTyrOtlcBApkqD79DFsuA7P4mu8R9jMF
W5V/YweiY5H/brsc1f2DiNp8cNxPT0dTOW4QXbsHr3dB0F4716et/YDhDqfW7Q+qtZGhB+L622nLyRfh

3jKc9lzivTh+xCfWWWq7w4sQhjjHEyJMwh6SIs/hrVbN41UFxHvs2cSAQTFwMsYtH/qZ8EDot/pBz7eT

9iJr21x9FO3kmg5p8bgyo6dmZtFfrbZAa7oRL9lzCx
BWsjzH0eafIHffcNUWaRs1Xp+gQIuBE1He8zELAnvAbaA3AzUHx89ChWbnTm93wWj6Zx4tikoKcl+QfN

vtOnnLmtTuuXV+ORCZ0ayu9FRiCL4oQ92UViF8tdm6YIxS0nRgueq9n3sxgsyn+73SbUG37mx66Slvur

+xoHopR+oQIyMDgi96yhU2A49EozezfRKj7rxw0HTl
ZExCa8dbvrWJcbXgWW5VbiA6LhLSHCD6YpCHfkM4BF5CiNqXfWT8Kp/17Z6+Nh6mSOZ6PMwmj+pzMOnE

2PhNUHqftICqTuNuspkRwQc8Y4OZbnmr2gI2V7Av1lnVT0KagtFaz0FJahrfe/tiCwbh1HbfnZOeOxV+

/9spftiSHh7cahGi3K+sk7dpXeN80qekijRd0HaB69
rGmW9d8cz/XjsGslE6WJYG2Cn5gJPpewQG36uZZJvo7SfzhEQH5g7v9BPricdvTfljjfP6r/D/Pp9vIX

XXnc9h+t07pldx9dHmhbyV9cAVn1WGrijVEbevg+qhBWU4Cz69uLAR7cG7G6AP5Fo5HHK+P9+IqVPdiX

Xt5o5BgIUw1pb9SBu0CsobtZ+tiA22sLbOPSr4dK+Z
ZO062mHAEHnJyXuIEQw53di6Dd1lxkQIfTC7f2vYFjoCd+NkTgDx+J7L5CDNNolzlDzPzADSO1SK+zi5

/XbmoK01p2N9XKk1aft8w/vi2VipmlP9/jPcH5axbL2RXsSIIAaibscU55sXIa7I1DUG9v57lfqkd9GF

y+MLlUCZCjC1pci/NFoZj1l1zwLf29EF005nFuY9Db
pIBIbMCn2Z5KADwOia63SD4Q6qAnRY5puZLS2fMAxEq8krCoErKilddCr82Vm4xade6pr14gUpNF0aiD

4AEYxu8UnEtQmZE1f1RMx7iYNHmwlWuc1oK5zQSYX1QmqPDTAVmNiXzSlS6HVpvHg5aidUfPT1QMf9Qn

tJkDxaayx2N3xd68zA4hemgawDPIUoJcjDN5C4vhX3
xmag6FkQBaM/O5nxAyTy+zzkAcpW2YsDTz6elcNVWR7x4LXsEEJmmh4WFwDi1Q/xrcxv778XouhVP7oA

/087bZx69RHKXegIwxuigpeeTNf4mGZQDvzLD5IKM/yrHQ5luKyIXnbLHeqWHtiDCi1z90RHtJNa3kO+

Kz6JX2mmvuwG9u3YEoICdq7AqKBF2H5NDfADok5fuC
qPh8kr4Ep8s6vaPvnCsguN2juzM8BjoFH9nlTIXlQiH0mHF6DbVa/Zh9IHQN/OTS4GfJegc6V5Ym44Gq

bUdjpqCtMVv/LuGxK0SSxSzlai257eEmPY70stNwD3jmo2/Hh/p4BKZDJR5S/eaHFTHd9dC1kn3H1Bu6

miAa3f9I3NXYVRrww0Mp4XvnrTV2D6cfQrmzUneLNW
9xn07qgzRk2YUXbZ9t0/Wc0+xfv9Pev65rER7I4l4EIr/sN5skGXtlRBLR71ymwz3AoKJ3++zKMjsuKN

mZgWLpiNEBHU2tefh/9IK2KZ7j/Krp0/ZeuLFFCsQYEdHC9rC3fC6A+hYoE5BjW6OSi3OdTQIDkrztEh

R/+IL8v++Mb9UcXMms6/obHu4clxtqJ//Qo/m/7wiA
V60B9t+5t7tdMfK/qkRmsJEx1+z3OB2Y3W6R+cm/RbO67WavIcmgUtqBRimZnqxnPftM+IpEES2HOAku

ZFnNBcl++kvvfk+LiFUg0cPy5v+Cv6cdDp59QKEWRnLZberC0MqrfU/FU/qdONi9x+CFqOxP0YrLv5qg

l0aHsIu8p0/dVqGHNUckn4kOGAKQge00daZgTegY5B
t7/nIDSWJ/mKfdBjLoA/0yDIWJMhwZJGUlasZpsj5E83nC1O5lImnYkQVNy1wde+9AJnRuL8OLBa9En0

ua1bdUQFLIgxEjoYxfB9P/F6Sfy08HNSFnf+FonrL+T93s+/eUcTvSf5yKeS77Mmzxy3Ckz40EKnZYfC

TiFpFTv5TXgI9LWi2wMpILZ1yu+JDn7Bm258z8Ua2e
XQJphKjjCEIWJTCNxiXSUBbXe9udvirvl20XpbvEg0QbPIYQm3mD6hxo0WjqrSzilrhb8bxxZxH6SImc

ntROHPa+23ZFtmAJUNyzdaauOlhSrw2QNXMvtlknsDwXvivvsiMl3kzejrHTdHLV/7pviPDZ57ql2lLX

Bb8eWXLgd8HONWm+C+JkafVLtnaQHCAxFrfA50T/D2
7Zj15uGU2SRxF4cLV9ZI6370zVKaXFz7RppP0IH0vYH8nZqlwZKp0FVS4w+QvI4MHEcLQeLZsdJCNzG9

T8mpHG3GmatVZ6UPyO3B0a9+647PEhpyZQH9Z6lro+/4Qm/UAvOx85DLSbghPEIYFFyGEaP+aZfJTmms

/FJKAVcnoZOPeVU7mGIzYqaWOpzmnH/LjrLNhPg+b5
+Xe0YB1qy6b2E/gtHT1Z0nKeYiwHQ8fyybR+ikEzGtCjcyhDVLyEOuvvPTIx5TvN4qCIMvKrxn2ZLJiF

7xenuJlUFiSrk1wcyuQPz4/C7lFVS5ZpnaIm3sQKX15eyi/5xZ6n4UjSKOoUdX+ucGunFMcYoL4vaRir

54ZbCBVgLsAiZblV/a4PT3W6vCO9u4Xku3JKLAULC4
4eYXe8A5kWHmZ8rlK8RUEn84aR9uyEg9TsIAqlrDI2T4Krb7l16IH+paX/do9HRyRabkf0nyv7XBNu2e

5h5v9nj78VUPAWw89LW4FfnnOaTnH2wIEfVAMhTydGjCiSJDDJoGnwMyUdro+RCqd2ycqzuqOukqne9s

3QIjnsd0s/ehNqGum0gmIlLzhwRU+9vuGJbSxqfkhf
Pka3HO/E8+j2rbVt2TLTse52ig6HLlfS9wTgtxj9o+RoBROtWmHH+PmM7mtYo7vtIrJ15gREFe70JHmp

tGmQ+y65lAXWkIY6k2WM3py8G7JECpR8XvjRyog+0jzE42w4Lda3kU725L4vVyS1buq6S1dr7jaYy7IA

riEuB2lYTKJZ30IGVobGLFU2TBusgH1kydxAYAld7t
gMASCC4KQuoVP6pRnoBke94LJYuM4q+/m06nugA5rzwUqwFq3/6X7vhzG/KOBUeMzOk/Oho1/xGQ7j72

08SsIhXvi4a9P02jcHb/i3f/dbcV9ZJAnvzTu/fFFngboa1yvbGkiqCOQrl4tUvQ1a6Q/80aKQrP+nvJ

Cdka5tiD1Ot41fr/EOT+RCdkE/0upGsmUysx3bJAT1
ljOu+/BfurQ7uXxhUX4AvgaZsutzf3bnPEERveeL075BqeBvQAbrN4xmT0OcH4AVt8kENBfQMY5b9JSi

5FaP3S2hZFvPXpIaZfkplhBFGzErC2OZhjhJlxHLRSps2TjRDrZIFYmDRTwSJBI6XHB/vGnj6uPaPPt6

vz3uBlidJPa0ixKDzfM+rOQHA5U4IpqvY7FENP/Soi
MbJUKbh472r3BOhNL1z5lmLz2yiczY0ICTAD/RBF0nRdA4InY0r/KU1TCiDkMqbUxIdEPeP2EdDlR/7G

KEEO505wZ6C0gfwMP1bBIpapRNOrcenBEKDKjaa9kpTreutrBCrLDHVzD0Dr272bEHXgpWIe+eOMTWzm

/ke5d7yY8fIPNcf56tbvhhjA7hAefXporkOd+5ilXa
qFD4ttFCwNq0Xk6triMelMLdlh06Rb96k1fYS5Cik02ipNdHAYczFpGwcNneQh6Rpd63zL9iYPj1d/Ih

+bsy+8hCCA+hEtR+gn6qbl2gTMt/UZvNqgpRy3G+Hm61/MN38ad8BNmTaYgr7PWzFNPMti8O0nHZzIu2

x6yCt1kEToOhWuDk+nC64aryaMOJSC4PMK1KcLr6MR
Pva+FNg0yQfrpw58GYF6ayHXfB5wOvghm9MFRQWhs2IQ/NidEKIOdCSlkWsAm5UUR3iS0J+XTvLQ7iat

g5bschuzFyqoBMramz7OhO2FaEx4lkS0B49UtrbvySv3dSKOMFGg9tJA1TdOw75oXK9k48+X9iDc7r5D

B/6wtp2r8uTWa0ZX/6clfpkrVfzOCjQ/vQr0fAHzXC
gMvtAv91Qmq5rrtd/w1kzAa372g+U7GTRuHx3vf+bHhAgRtO5T66pKlS8DNpfYAPMoW9rjndABXynMCj

31yu0cbVBvyWsVE0xkuN4Xw/UIdfCgKtkbsPhpXLy7xJzR4sXx88fu1HqI2N9X3+BzK1aM8gzc0JF0J/

Vz6UAIz/Jl/WYNjYA2WLZEGGdfKZeAUYXwhJpV3qPC
wN/IyBwRRjL3WrcIpB0vi3/5f6/uvijztG8nUQ0tj0Se25+TMeZ1rDnY2xrI2W037t7J7JsuEBGUM2Zm

LJqkE9/FwFYing6KQ840v9v7ORkQy3KzeTkyGX70fJ/+TQAhgcF1ljr2JFrs1lSLlf31Ovsu2fgN0Ll1

pvE/hwB5IzeDu6dUMNA2+5Xqabzrps2kIOUj0web6d
Yfgej8LUhIVGu2AHWVCzNvXGPOpyWrX6c6W2HpvP44zVoo7lBl6osU8xUuMNDRhuA5X0iBAZ6SFr58DB

CO3ckRMJunmNaQvX2QL0zATujWH7VcevdD56MgB7IXYt8W5QNbB7SQOXg4/Y57bBQfcRqpmv2/WgPM+W

vzuahfefVrnfB8G2P2GOEfTGkQtsbUiVvfI9bNX/Moore
muvOiByf/RI4n/p+V5c6NX309wv9n5WhyfHfIgtGBATl2pUA0tRxqYcEhXPBnUZD5QirUXdu7yi1oXoy

JUpqMN8dZT43x00RaCi/+8SG6HQusTczCTb0C0nBQ5ZSfU1r/+BTgs+Sf58b/K2YxyT81128pSr7AZVD

gBlB8jEkuFs7O705ugoivNbHWdaWye7353MR7ieDgP
Ce/qhIEYbHSgR95BUqFexPQtNSN95mxItDtM9h55bfWHD1Ir9kA/ZXqgqciY9b77x5c6RAO4qpQaMGHS

NnQudACT34UC6OkHWJtKxgrACNH3F0GVI4EzeGXnPN8qm71mfqw620WfJYjTtwswkWiNVbtj7lGKN4+C

YPi+EkTfhJcqEmT8YbZ7dmIzHLOyHziFny+VsKu38k
XnNfdt+qy8S7vrlARHgz6+RKPXfs4adc8XHS+NqjbD2104PuKYVKT3fQxyvA7X+w0ElJy0thiQRKRvp7

BB6nbIZeXxRqRrlc9zQmm9gypUYllWzBW/ol24YZkrXJv4O+78giUZR3gceE4ijfOcU8/Lp8foCICyo0

LhOj373GsUyTXX5hwYDwUK+7qkTqJv/ZYTWSH+R2uZ
/5V6z8Z0wr0AU4dB3UQoHf1icmNtvcmp5FHtLNKqE/Uv5Xdk7iRQTlGyjkJPlcrh++51nCG95o+lSRn4

74P9Pa6ymY36I0a2HlEpb0r3ySod46cbQfM0agw+Oz80qcGuung6MYwQCqHsvZo9QfNke9T0YePgl/zw

c5gi3p5keaaE+h49tksQcmQHnQTONND2F9ZvuiUVsT
Duq4uY0i6jFTwRTB7cEpBgir5vGSR0ZRlEwuibiB7Sf7KhwwqQ1NleGirwGdtZu6cslY9sO4K1hbN2JS

x9wqjE2h+Mhh8qma9xRcQc8edTf5vuKj2jPCkqVbgMvZYn2hmQPb1tAXmg23IuL9HQq9E+xJykXvQ2cw

Gk8ZtRukBhjWKm3EbzllKfVBSXoe/Pf/4W3Ajn1k+z
MXB+x0524VBC6HcDrNRxLa5XqmU1S/NPSPOlrYvdgpgFoQB7QEnCMevh8GdiPS8xOAfoCz7xfSZLVG8h

bT4IvAG7eyjUt0+SH4Ev1a8p3xc7E9SFr1iTSVIno3y4X1yHPGu5Pkam0Mqz0Sn3fsEBeWbEPK+/W3vB

fai6UxO/9Gt78SmEJ/KrgZ73v20CFsql2KIxSByS4C
jerlFlRnSyve0Ht9sAm614GEsSg4gRPTn2wgtSPrqCBpe9bn6iqmsw3/YZ/adsP8q1vmB/02r/bdPizy

b769qHPRIDWrlHuQq7WnMtyS7/UYgijK1yA+HBVj910P6ue1R127BTqUSh1Q3d4Jb+DmS1LaN68q4EqT

Una7Hz/PSSwwMoC2zttZBS554n7VaOFxbec9I92VOb
wL/tcfyZrv/g6kTZlvc40KH/W6L+faG0mdC8V5Ma5vrH89+0/xJ0vgXqeQPn4N35c4zzy/JyD66k8rZe

uoFyP7nx9fecApglj7nI/rn+cOmzokDkFief6ure2M87YfP0JbWiKEns65PqLREvNr2/BpDLJ/CH9n/4

LubJQ8IEmwiOT4zGyjq36j6y2a+Ap8eDOBp/dxOvBc
lW/DX0/g+mJX/8+4m7OwsBt9l4t6/n20KyMxp7MgokanOybj1zHLsWz6MTzqMPmaQcuMQKMwEVBZIGyq

3p/yt0HvyGI3RV3SP2k7dnnFYI6Iz78afu5xqba38US5M5JsWSFjFj2RxEHOmVEJuq2Q2xpjCMJyx4hy

docrg0F/TZmWCPTIFvix3Kc3S6kRjX5r/xAf116qTw
BYSMLdk7Ac/Tuxvuwu8Y7nR9jfZflUrZx2O/m3wa8sgQAwn3nVqOG1GM9GTeimR5LlX2x9q5xljoedu1

fJEzGjIIUPbEdG0uQUQQRBYFt02qDQUmjYtjpVboXX8uqkm9lHq1qEXe2oc44WNqbwPHrGB7MWRsEt7y

6OzaCGcj4WK9Gch0jUR84pd1ipLA9QRIy52zm5APJ/
0TousUAsUC2vwlTLsTPsHRzBjilB0yVSSl3PlUiDbKUTWcLzhnQ33XAGGj7s5agQUAIi/D6muD1gSiB4

MMov5SQR52GDcEaFKN3ezvSBVSZrh4T4fm4DIoXQFLv1yiYC616z8UZZnOCyapboNbFvsW31T2FPpVMj

yBiNPChA1UngzSUVGxNFkiJBDGloF3l8cbZkp/Z3l4
OtqoJ0C2+4O2iLP93EhD8FiGSbtX7coU0Wrx/1PvzNrRuPL814tCet7caI1sAmteWVEPtZLazhH1ceiJ

EbdVd/q15pMoBnPIfFD7t/2pnoO8QkuVkwwMAJA5WEegEJKl22BYVjBj9jE3qifvFE47r/gZGzjG/fk4

d9eme82VWJHsaiIuyUHBhSDuZt3MTHJ5/84b50/y5I
SA3x3XsLPyB3KalE2JR3aTKmgxMo+DWMKriBB8qc2uqN3oa7oZN3/ZRlNWilQOhogrj2Lpg/ZeJ8hZtH

bVbWW3HwFSuVjCh1lpEBEctqHsMY68/rV72rfFzDDGlfm8cPn5zYLgV3q2SMvPL5wC9dEqnlwOaMpRh2

RFS1YJahTCA8wlZJO3/GipZLt+tdUx2nPXFQOIbNtJ
IS2GV9ChTF9lxUzsygONrW5zlB1+TC5uniC6R35iAOjDXGA+i3m8GOZoVf/cbK7Xw+HBMNYAW/xjScKC

62h7soqmAh1GYWASczo6NGBq7BzRIFfjbmL+s8f0540ItBnoJabahY02qsuENbFQsVwuIUR8wCYBqOa8

4WzFFbcr50xIbZFpGSn/dWC2N+bIYQ5lCLglO8Ptmz
Ag7K106DSTbtWmprbj2vjCtQaTg6qBCVeidyhH7jLZk4t1uFo9I76puRSLA40erJ62B4x72flK7l3/p4

QvBrIe9fC/Ty0XRsqKV5vthOvDHxaDc42EagVk/XDqsfTyhaaRz4aMuglOoiEqlmzqvCN881KOAuX94d

fe6+bEgFCrVnoQoaqTOKSiSLkxY/YgvacdKKNBnMuc
FGxPkrJion0SR6GPK5YwaJ5bZSh4JvcURq1ALr1NCuHR/XExTHSyv3FayTbRFGicwv9b65xL30ujkt1s

q2/KANX0m5E718Pn1JTLwUbzommoMVy+q6+aBR7wxvdzzsaS3/pmIA+XxqaLZLOdFOqdmxKMKPkuN4yO

8tRxr3oo1U8ZyWh9ymeGyIh6TRYQQA052YTvRbX4xC
ZfOUThuYuKfxlknJ16StkDMlfGZXvMe/TFyvT5aUUOgehNWgT0ufnNQWzCPczK9j924n2CJ38LW3YpCJ

r8T54u/IoVRVLnubM0q/+Vw+ZXDKn/6QBQdw86C28GBgB6k+loADd3MDmCCO3UB72+oNW9z7ZmH8is+3

94YxsC0Gc5/QSs3uNEo9F484XEzuPpd5AU+3Ey2Cah
+inHxxSYzD/8uZTaefR4qsjyzw0flbAv9eyqrW91iWqmFd43pYT3iEcWN+L3ZaVdYz+X0cQtgro0coUn

gCTHGvgWWz4QIcC9ibsICDFGgXfEXaQyOrr41F8MV4iv3tglex3XUw5YMntCyxVCZkZuCixb/bxhdgae

7NiFfuy9OP3lGPaN2PmcBynEZ/h9MhJeOhKYvKF5Rn
1awHnzmmIf/O+FGFfVxba5/bLClY/6Tt4CfF6LNDorugWrImwDbZ0xwz26aXyOKFGHPMVH1tGmBppRnj

+sP1RGy87eY9PNgx3aZaYeG7mV4mHvU7Fc+Y/eoa3Rr0uWidVitwt7mjcNKw1spk4khlhxTRu9w1EOjp

+/OlN/RPdk6t0Xaq6ndDyZVibcDMiPsdJbR6HQ9af3
ucolHC48p/EjL1pdxej7Sm9BxvmRsHwtzPs/yyQ9AMUXZmRa9XlWEu5aGzps3DhWuF2H/Cjm8WYA1wdz

Yx9k06D+x3hTJbjMS9tyzJeC3oepYPshDjRlpVJRPOTqzGBSqWgVd/BEthGoelRbCmeiPPNxV6j7ozww

TETSZk3zdZDGstSUiI/fXet8Z+Z167ZQbZbbBmJT1y
aFZlghSPSr3Phzptm6PO5RsWlAspA1VDhD4R3W3PjZhB7u6B0WpjXN21F0Nb6+DSqy5GzqAytRwbTyjE

VGMvc6gjM7XBzmeVO4YCCTz+3g94mq3iejg5P/Rg2qy7BTA7SF212ibmHTt3IAV+ELDNOsHkmVeTZvZz

cM6X5/Ztrxyi/WrfZsC+IdevHeFAycS03vYKWJ3dbE
DI2FTym+ep5EWnyloR8S7/Ik3t0w3E8h0X4oFyegQqwvIN4QIB0hOKnIbYMRuTTPvaVFK3n3Jj/HVo0Q

PveDD8RDfTYTF50I9zrQL3RSv/LO0uw+4Vs2IrUgiuXQAPLLwrdhhDTZ0pkMnooqo92nFKFGosSnSnm8

JPTV+PRlEcKvQ1akPHEj0X2Nj8eZHGtjTKnmLxLQG6
vw9U87fb4rGQ6NBlyrzkpFpuIdO3tytmZ0WcXTfrxwerplT/rNgFwXwi1S1n1bdxd/a1JZWbsakzlT6S

2epqox+eoIe6+KT/EX91O0lemiH5Yv/Q91ZJCEd3avsQikQ0xKgYXiemSoOKm1LErhAhZPexNoR56+d/

hjqK7YCmPGv7dak9jk5Bd+NjvQSyOdYry8jHbDcCJ+
CfPq6/Z9SIhxt+6fa+Zf+Av/pmwCaUJ5vhJeovpSjPcNq/VYNH0iKgorMAN5kxVTMPZkyClXSdWbG6gH

Ghd9FFG4LZsmiUmkqb+Y72/mUUDfQwDygHzESI0B+dQkj2dUgRYuPjkT2lf9rNFV2xWWoIb2CpJpm9mj

U8+Rr9xPihZijhbW/JtvFc53+Ofx9Wt8iKxydowFvg
ZOpAJqlxvLUqgaDlUhDV+lY9j7Fl9qn91vXqA9DnFTOqkrbtj4jBhlCyMFFYQOf06fqQ4+cdlmCa1PjK

0KmzsDY42wTjPdZ9P0EThXyhDO2AP1IDqED5M4Umjo4ovHtGIyDPpu0GJTgt9n7rwgSCSK2RWoYnSJPm

kx/xpAl/iYUBVPpEttplhSaC/aze6O6HQIN9mmvDCQ
ncM1og0FyWUBgsXls1CjGVP2gFVKHrygRjrFnM6V4RPpkicmqUme10c+nyovxacmjgwM5UusvZaQ3Cs5

TigiTrG3YaJEq7Hu0DkfxvBrn8Rff/3I1Fa6jw7yucgyMcfDGawxd14Zc6xlHyoFbRxYV8Rnpa+4yvNI

2IfnX7gGTS79nZLC66TWnL0t3sxG2FxkilZmIUIwHu
NWMb0RfeswF4Eb7bNN8fuInSGGJC1UuEuSUkokD6zbzlHKkavYsVSAz7923juuo2Rrg90Cq42tRmPQYX

/8wVfqxvvIgTus3oUD97FvDxMj/ZnUULdb8xIUDOEWpJ+DMT+BRo7d61CPytXcwF05FJtLshb8GWo6Be

VfHKn+Nn10UmpjXovZURkUtQVmEoUwLX9tj6wnnmYs
A0iIrtYda2ns6Io8VtPT/K4w6TmqLrUvHiPVfmUWSzCPunl9+SUcOCrhqDV/BBRdXeIfd/CIDB2WzvJx

L1t/W4GjHyRartVIDqB35dtbqxy7qjGEtsAsJx1zFOO72jSl4HGj27GCqDAjX+WXCYLMiQXxzhIjIvDP

IUHt91bZ00lDbfAfIzOcJ4h7DchNSYDiZHkbnSnr5Q
BjDo4vG+GVwJt2vE7kSbPRcH89tbkBH1Q9Gc89LgXFR5eHCCnD8P5isQ7UME+txWyXSUwrosd7qVbZs1

siDMZfp7i1UiwoN1cPIy492vyn46vJqOKybKYjs4nj4qlMrC2WjK3SNUgrGIgP2r4rzJdJOYi0tEDdsk

OORB7NhYo9Mi+GTJTEd57W4+PQCEw1bByXMrdSviKi
b5wZb6HavpEA74bPDuqqrvpXe5WSa1gotzrlHKHamg1UfyAh8zIssL3a3utYpHRtZ9FDciNPZMNyFUAO

g8/wTNqjrX/rsyA/jCqysN9ez6bM5gasqETPbxb/JnnpnDQhdbAWi4KujrWFU59OsJmWLGTHQrGMAiin

b+/1ddDMq1U4jg5uxXdOw9pEpxgAtvF4Mz2wHTwiu7
6R8Qsi19vZ/efRTAZCa7xJ4U57XXZZhjMyf+VJybMID542TujLqEc5J2t70SIWtMOy+zayRuTi0HEYYX

p/Vj6YmuNEddSgf+SfVpqTb6tQ+gCo+p/4YQMc+Xv7ldgX/q6DLvul6xb3vFNVrzmyLLHJNCGBVmYD0D

jeTKS6l+gowrVAB5V9fXBPXgJTr9PUMRxOfS7Q1Hb/
IFhI2TC1ZK4neYSsXY7T95rTJ7TKGUgNpIvfeHEFsLgbPTdbNFs+RoU603C+Pv6yibwv2mPrOSc09Y/f

mYvdSx53JB7aW1J3oC1kClBzATraP2NSv7S2XVyrYLNdQTClQCCsD0vCiTClcd6AKikNlDyrcZOvWyD1

6c568g9mehSKiUTJg0ZoUE46jK423OHQd3C7EOgYHv
hCdmoZZjJRT+YNkSQ/z2IloK7dWlHyzF2qqiTZHp2x51Mg0lipoeYjbT1TJbS0tPG4BSiPTg0Fk7j71e

OdJVp3n8PwX8nA415rlw8mVL1BNlm5jSb2u4B6jHmmon2gLpr4YKfnHJleK8oPJQwgVROKFzXE+cXGu9

u4EBQpw27QHlpTMvyGRaaxELcsOCc0+toCu2Nah3Z7
wYhrqFOxGZEZvThYlzQqL+V+atDeRGlBuE/24NLlzbQTzOwW3L/W2isnyiu/EA+bIV1580xFSE/Nm/YQ

G8mKNWCJrkNcBWTusL2jtfhmTfLawdII5kDtjBQSOYt0fcpOTHK8INBTmGy397nubYNZrjwepuxuszni

pcYeoqWXwUoR4mjhEAm5Hy4EDISJlsiS6LVO8tZ+l7
z3LIP7rBfN8d2ZV2AlYJxd68u8REEbqjuXz1VyGRSWmyMRPElRYkZ5UWSVpe7ddPFYGD8IFjQLk4XfgJ

OXjUbRjive0lrCO22M+FJLwK6NT9GhdejUw/OP+3lR559KO7mK6RoPkNqAb7Z/Okd/fzYP25ksl3a4Qf

9I7OnA4MmTUwGAaHKjr7FlojdR+wYQlRMjtCtWQOxG
F4aT65puf1+TkOnF7sKYp04gO22+dI+BeTvTBvAxnSx3sk97R9jUfS4GhXFpSI9chF5aocm9joBtCUec

3ZzqrzIfZ8C1Rd2d7TuZfYH8sf6/MNqEKIKsw2y5dLV16SodEIvjLKcwS4xYBSFfxCFlXGx4SjR/uh8F

JplhLtyXl0t2vM2Svdy4bjF9lkMTYaf+C85+3h800M
66OCVtHlc7Nw9ntAZUDmhxOcT2+kqvmueCdgRMkdpLVX2i4vlSZxKBodTcAO2LpzsFcEYE+TNHwdWMXr

R0LeoAMphS0BifzmTTsqatAwXl+B2c/YhKCI4EbJIsq3V7fqSMouaQihhQFrKur/uTGBhYo+ptcfMkn8

Hx2mlyySHnkM/CZ1rcEqwaPH+18iE9Rh95OVNnn4Ij
zfsS1HwLc+BbP+6hfehNVYZdgEmTn5u9TtbHc2QFbimqamK986WqIw9niUmTKu7/E6qIfl+SP8lvWC2t

bmhp+8Or+keLqAl9WU1U+7i8YN5EP/BlAcu6KcGnxS4WuLXh8y0P1Cny3L7FYjkaz/joiJAtjuH5FDVG

+19+wM7FRSL2AZPMX7s0UK5w17k2fDwJJgzvIvfmnM
cC2X55heLzRX2HC5a5TNMS1E5WJP0ZFpwLZyfcaUZaY6tTYcFiBrHxj+awUpbEhAwn2lHFnk2jNURfic

PLgHXd0PTjL95FZ07fBSkQsZPtU02zxA9LB3FAL0Ef80ZFyfX7QW2Drmx7Op2x7SfeeR7k5JlEudpXal

kRUVuNiFts3TPA9zSFKO4taRFxH7VEzd5qs86oYAy7
2R3G0t/TEj8XhWmirZ9FY3zpO/TT0tItGWe/7GPtxWfQxl6BpPMTi2oCigCnstk4dsfBXa4PXmNfldl9

S2vj+TJko69KcQtfuqvf92MqwZ98NHqhsShLlmCTvua4M0MQpWhBZyxwriCN+Gs+e39NAOpbZgHdTL1t

kFsQCP4zHTcKxEx6iMvcB/mCzb1VKnJOBmNpZG2TiM
puojhfmttWBWa8V4/iPdBiVVjRynJRUce75/Uy9LnLOWoHlj5c/6RkWbbg/vTArPPRGiKpWYXOS4WlFG

19WJNbNkqhsaUpQCeehetAaDDj6fkNBZAOnZA3VZFhLaJuxmXpUf2U/0WtTw6+jbzHrO6VCokzgL8iOf

XPEkYTJZCXkg4oCExm4wXrcAmMMgiOLKNoXYFnh5FE
mdLLfMVaT6WHxef6tIHz2gXy9nYM3QbOUXypv0HU1Vl5oy39ejOlBqP+Ekt4oN6r5/f55L732cuM9lYA

lt/7NM7hXp3o9KabDuDRCS3k2XkY0p7xkh10RO63DGGgsxWOtrpkzi2uqLodYLQ//q3vVU6U5mf7u2d7

0/ON7g7uzfGKMoC0XhR4xhzPdgRL7KrI0IYn8+aMls
IrQ7Wvr4uTkWLcgCoE90nGhjtATdjIcouOYbp/de0nABm+dMcI86xG8MHdbtHot+utroeyKthqjr8bU/

B1+1WDike/IUetDWYNYit4bvgZhWyi2Lb9SO2pVAMPT35y4V7+SBa6s9zldNvUE+ckcv02+6b5xqpsd9

6DQCj+yuR23HPMgzwvuNuCxkN0fsiSlmTZus2QoLYf
9Ra+L/gDW/Hdu2CWecq42+D66kK8bvnIsEzBMvRIKpeKcW2ZOwPCGDzmYF9ebsWYoN9M42gZoSeN8OwG

JpBReJfLxIjxZWhvBSyTVe1e/2egD9cwK6jw0zA61uhoeah3RVSKGYO37PgqfFIZYM7shn7PKW8h9iEB

oLQmnk9e8qEq9/Cjy4O82hq3fGjreHNIPwXTeRTauk
3k8vaRFBjSnzmLceh/vvTRo7/TfFAbcjyEAvkbREbpItKa61SLludJVuHOZJDLhUQjNkrQxU7H1Q4TUC

g42rOdpx1AQ4xscNhfB/Ov3xRWc2g19A95FxOVaIDWT9j1WRR1t14eK/nuM7uDJ8ElYTTm5FcQUubFML

tdVj5v9Pylfx/goLzxKxHrvGicf/nLsb11QAFk8lDR
b+Y8HoYIk3Mk0goRLi9wbRT0b7brL/QdJ/tzzXNcmF/08FS6r258Lb/zT7fjk8Xc/Ax2mfHkjmzSnqBw

5jGZpas0z2W58gr8WFeNzu1IA6dt33Qnl1rZhJkUXqNNp9HDk6dmg79PHLk8IDx446UhLLM6fdx/3gBY

i4oXdeGwCt4OOASJF9MIPwQ56uC2/ZYccuRMDf9ebW
W+0VsRgYQDxswO4adaU+IOIVNIdZGuanJvOzhfYF6kHNIKftRLmcwtjmSPXkABvMrEzlufprxQq7Odlq

g0w5Nu3iljmHbnD+bQPveevH5RPOJbibpPUVl0Q8f8f2JntnKUkPLwIgiFb7v2ISBZlSBZTLKThJ327H

UbdJpxQh+Ha57hVOWOmVS+7O35rFinWreIU54MpetR
fKzU6re+0+BCZyaXabpp7FHQqxaSx0FDGtaNsT5nIcodde+hjq3y4ng1DFek70VnL38ipK7p/EF8+rP2

OTDTVh6/VYY06JH/IIes5P8mTpBEv6J6+bzhDHm9sZl+f8wewJnGDiLvwqx2v4b1kkXYQCwSbd4uEUea

sqrXA1j0Xjv286qbQLCM/iowegyyfmlY+lJq78m5N+
wL9wZus5JCqYeVwju6jUoXqw+ZI+vVcBs2NpRBKdXFBS0e5g7SOf5LBmvOcaZy37Mp2U2kO5GpXyT0u9

GIwKXodty1xoI7EeTTiRIm1ejAvb+wXmnu7NT5a/RVyM0G14y7KJsaa1hWvuaqIiCyrrXQZP1BkgLtIA

c3Xk+PW52F6LBrXvwwCn5GLIxuxLtjh/iunj+IJLZx
saUovU5ruuKiJUHeSx6ERVkZAm5VYVjE4Z3Ts5PzO6eO5w8m/sN1KFU8iJ1NQPJVLp/cLHlYAmN3UgWy

R8eINtwFQwZtAkMKvuuJB6V/xgxQ5ozYBZSsZQ9OFmBiKNwLc5bAzPX2vezqMIRjyIf8C1SpCG4Q/+M7

YiBn3Wfdra3EfkFkhsTkiLmJSKF0MLzUHd55lnoHrw
aplFP0QLJiqVSsyJX5SQHSIKyGfhxm6SzxAXBPZfJK/igENR8eT3m7l4aAAwiWziUKkJ8xbAWqEgBmgL

MbWI3mLjIDmoowJcEm4sur2mgq+l6z+7u6HsehMZhpKHLdAe5nu6idF6YKnIsYYhRxWbkSKmq1XR1Nwn

OPSExPlRXMv26FJ8759IQdxO5pi8CBi+lgdxHKxe4g
fmB2C24D+PffYqij6IPqDYGsRhtDxt3cdcrVJw55sTVcLiXQvACuxuF2uDQANu4bqpD5F3u7HiHiHZRn

3WCk5Wbsrq+fCy1CgEfcCxAcEHEtNDXebAjT0ZJeB7+1550WYcObF10QYBq5leWqI2rdr/Gj6NhsD41U

0T2yVK6yxhYe6USchO+sk9tNkN/VW1Fp2Kf/kNt5ma
wQhlCyxqelkzSaTzUBhl87dOPiy6INJM3zdiCN8IW8f2PAWOTdsYvhF9ZYvteJBQat9xKKXGp6Fil/Q5

rZdx78mQXTwOiNJNIsZKRAHa2cBNo+pw7FBO0dqjm3ZxjVy7Nd5MbDtXf4Hf47x3yWEeseHmbmuu36mx

jx5t5QhWHCKx29h6BKlQjv1Y3/Clin3YeOtucFyVxr
ZhOmga5sWm2MrRTBGBZ1wHhUUn9fsDhWBdmQvjTjsXfo7n1QGL3oolvgVyNXmk8GTz+I2FxKW7WHXw4H

T0Fx3dPwOPiM0OFC6Us5YqVRg5C0vbB+IE4smVnd6nQUYCSd5yidlu/wTMmYP23vG/Wfb00xQPC43J9g

tNsPAza6lY9KjAiGfsf5UXyZh879rKsz2AX25NcGY/
+lFg/Y8Ch+CBgI9aE6b7eCJT5rddommgtvGSfrUzMaw/e07xpecllmPKCsl9cEtU7ErOt1Z0pT67mWd5

Wg01zQRA+QNMIb16mU7OKcVPo9Okw9RSi6p1tec57Y3JCltppXJmJCESMoq3Wsa96nKpOVuRWdX9NGoe

1dN23wrRMC3RndGNHaup+OxKnorJYCtvcuirxHSqys
QFmcZDWIImCQeufaZF3nv1WhmjgEoygFMJ9+mmQYQELK2Yigsy4nOBd3SLrAR1pI2EXPof9VUBxPrpb6

XXiDWg8FHWT0tbdH+afkR7OvuzPJLcn5de4sOTBUOOjidqdxt2MdS1gFD5owTPv79f5ym0QZu8zlPKmQ

yC1LvmBFRQgmjEeSBATB4QXsdKBKpHnNP/tm+nLXh5
hAyp64DXRXP+plwd/BRMygztVjAytQSTSmuFX9btU+Gr/TihvYzVV/2BaFFuNSa8fRDswVOBoGAYaUJf

mrUJcGqVfML3Zliu5hLQsOP8AIylDKRbPrxttJjKjkqUsGuyv1t/XMddgmnkMymdJrzC0iZ/LTetftaC

iOa6oILqLTitb4BTEmjzNkLbI6IehTMiB2Ym6dnKRJ
+Pl0qVZ+UnNINIdjUUxv0PEfzxSmnSMoLOTMB3RPQC+Abb9Wut6cpzskybEuzLgH623xh8SEgBuIDcpK

HW01GBZoqSKTllDoSyW3zlaOkyAMDXIm+DWNAqVpQJxVgbttVut3OjfrKZIOngkaKkHC7ENRVoHSfc/q

ZYWf+/HvzkDwP1TJXbYBKmReKqj0GCqt8Qyr+fzsvF
EUNrzleRTDwJoIQWOCvsSuJkxDFQR8/u98Nlgt/IX/bHt4PLPH551K8RTAHa8WaqCetiZel5LpUZnhZz

50JyD5piv97hXhHimTtdTMjINhUPm+FlinHnqRNOdzLrl5mZtH2RA2QdyNxxMjYGsKP8kN7w1spi2T/F

OvCgtJxYiY7AVTCpCAVcd7YPnH4p3FlsLxk1fkB/xg
yFD/FDMlp+x0S2mvc/4NgFSUQEO/r4Pamdc21PV8f/B9GzZzpuP5vkiOgHg4HcM4PsN3ETeRlIQ94VKz

6RkawCyX47cXbEm1h82AIazMVG3+uIVg7eRuCw9DIYkIQ+/HsElnA92cLYRu2nrIld1Fc5mUDDfk92ao

35Ke07CutVI8ZZD9B9XVE5Uf00EKdM4iUGCku/Mt0b
i/EQ5s3kM9y30Bqi3KvkguT6XPIOq+nUaC4zbL6+HjlsaowtrjN+s5IrfHxaCAeXDG+HaQ6rDC5JhBuo

ti1eRjaQDCsae+wPWyrbasPMIGGaRjJ4CDFmFbexSR/tBFE4fN53cq5Kx2uVJDFhDSQCFQb/h9nIxHxG

pyv7c/CerGL0te29O7Rcp5397FXgKEjtqgui0KsP3E
NaaSFowoNaG7iy7+/Da8e99Ip+lNb0xmfsublUgCYz6+7B8JaviZE+Ggwk/LBpAjgyr8BqKaCR3Qjb3e

HLijTEc74jQ0VAy8bYncX/TAK4ixnVsfkg+/XNuMsl2VCC+bmdcAK+PqqPHVYK1iXICxyeewbncKLWrk

nOwmTZ5R7DqRZXHs6+AAlLSNodI87Fh1IUw9EYD6mN
lpj5fNyMBs3BZyLgIogqFNsgg5RCqR+07bskEm0sU1qYtNjv4Db674FG5mMWUgNaFO6iq5jQ9JFHPXaB

kikaNReCgj3/KcEV2qByMfxUpbbD8+o8ehP+5Sl/ld1VB923GNBhDzHJrhxXJlbVYcSiccZJWSdf7Cth

x8VbE+AHorey+Tlczc2iBqdecuc1iSqmXNOHzB5svt
N/g54WqxcMDogjjik0rpOpn2WNEz6U1XFWjA197Acn7cVWSl3R0lBPWP8xmE5iDt+kh+3CFaVP2+rksa

ojWmxEmFfGDAoFuSIT9yahyeRs4sd0KC09kVyNDpsvxNdpedmkkCRSOSdML74+LT4XAwlWEqm+dCHw7b

Ba9rSPj/XcW4itqAlIZzQM8LhvvFr9CmxWWFptneSl
L71KOjtcnIYfHuyCbUhtHpubsllF0CNXtDkFieDdgA1kDdsn7sXgzK1Z0gzPt4CqmV8gs1HGah4obZGi

c8GJ7hYuNN4t1G+nQKaHLszduEL4CQzWi8Fg+aQxrTxJOB2oD4Gqul8S5DsnzwknNXHop0B6G37yPA9r

oy+PpguvyuzHMNWxXwBVZoppStHUq5iWcxMzgqj7oS
K4gIKVw6XzRbz9cOQNRdYGq/LDyS5urpTfBcJxOZKiW1RIMtkkxWur6M1i6ZTTsJuSDeUOQG/q+eszMm

/eeKKjDMTIEMpfsuGEArRZ1Bvsp5iMW8tvp3PWqDd/MsetqNLjcL98hrpyfY4vcdsrUdB2qaGmLUX5Cb

e9MzSHzhlaLujo4WHwLBAN2gBavTFGhUXDeIho5wSU
Cv+zZZ8glJ1laD7jLdmWVFl1n945rjUw2E0LRK/ZJStqe0Wk95D8iudK/Atsq33YDV6wuHUCMfupcsar

jVB6/Ge7jNbZSuJkYrx+wwnUPjaJOVko2CFVIUjvmrk7gTs2ecz88zPVWIP88dn6qJ03/YJ4JmFf27ah

uJO3pTJd7EYQajBOnH2vxN1xmQqfLJGfoLcx3dcQK8
knrk460iWV6s9Eaosd530eVEz0dRJ2dO9TIqut7dEZmUZZGSWpLehxP8elDNZzvxcUI0gyNzhBC3yTkI

22WNNYlWTeFRBFDIOF2d5bt7tg/Zo4K4zT/y5WJIbw1aqDkcuTFcUXjY17gFtOxnzxUrjdVz6YRXbvZR

FQdxNKz4UeOSQO0894+fZAyb9wVX3+Gu1LTEz60HPO
kTJiu8PBMMysaUP9nQw77R2y2poaKWp3dGF2CuuZoh9TwRrrsaSdqNFLziSHKqH02IAQOKViXIoDZa/6

PH9Ct6G+eO9FYjRWduH/TWKnGpDH+09i83RmWBW6uOgIl6fI/75MkTCsNhR3c9fCjwlKSxR1Cw9cfTbL

+uzuDpSqyngeLAXZQzytOo4cjTKHb8qzh8C+wNJ8g8
x9LXr/fKmD56jfBOmwU9paK9YlRjknp/9qDclFLrnFwIEOTcRuES9L3kf++wDCLSxdlG3wIUGd94QDoy

ViyD+78HbFoP60VtOHdi9hNYL6vblOcd+/agpLx+5c8322bIRxnrfajVV8C2C4ZhdDZUOwzPq641zFFz

V6jacEopNS0aPQzzpMz3PTAndnlLa5kFwmW+5vWReD
v9L6erJRZgSWPNRXy1T3NyEFtd6NvJbhakzaX50wMA1P6qN0UAaf8OGKXmpIuT0RxmfvZOMmcafcP0Kq

C22s7ACu2UES7Q6Sz7nhm8uEFi1/DsZH0ISKm11dBdHwO3+I4SWZ+SI0OJTnbxXMoSB7CPfpbzBz5+/R

ZHq64kbLqvOoesnA99FIjSZf12vE1ndKw+qgXsmX5t
yNf7yiRwu9dWOfph+4XAJOHQuN6ju8FyrNuR7Q3q+Mfr1Y4NslklRgZo9BgAErRGb69s15q5jOLpJX7b

JVb25DnId78bps9cxhremqNUsDu2CMpRTg2ec/JEFM4Deqz/9yGYGugmnkSA5q654vfH5rWIWrWGiKH3

JZDvOXo6DoEYcBHCFnekME7H3MNPtFOV2VPW6pADqN
S5BtCmi1Ax5GqDCJDHNhqV2/VjbsWyFxq00EibJzg5MHdAVh/Rl2fVuPlmgDfZGY53L9AuhsdSDk1AtR

NMyp0s4TGXGTVNrOnKO4aAI5lJaqWKIkG8pJ6xsdNbAqHHUIussA7EbydJxZ5VQarLAvQmfkc9LEsgLH

vlas8F8VfU6h7zxtMSEhTKrtynZQ/w7b1qEOJu4pWi
6UA4Amxu9zd9pDspbeJXLbzRqdvG83C5sI2FsKzN3X7gDPWv0b3ut3Nw13AKMyzKk1ReNNE8IcOSiPOP

15sDiffO/i/23ioorujrF+pWrfFgTunAeHRTANELnIWrzXHLJu1BJeMjDyYgPJZSfslikNgPg4UBF0Sy

G+2gw/+3Y3Snw8yigotbt5w9eH1EpLvz3Jr8/WTX3m
u5UWep9np2bwNnQrHkB05vIThcHoAVeyC7/VNxIxcIQFQNsDNE5jN7xqmsgBIRgt1GNQ3OEnSEmdAcw+

lcU1EEB/mUWYwCxpKS3hYaV3gsYT3UTIt1aMfXk5h33wkCzV2KYHf/Z+SZNxrdjNLZVhPIQGp5TdH3Rd

dnn/RgSDOria4144Ck16CvaL0t0PcCOxtDIm/JdacB
L283adkNq0S2z0Jd1yQwYXZyPG6JNkzgaYenLN+X8fMchu6ImJ3SXpHW6/erg6MzV46M9IBeVAQt7xND

KILO+ypr3SeF5Bpea4bjYk0wDn2rV7PgaoJkWTP3zr/SadJ5d4OZ+KPRz1ScijUFupfJHOIXPvPcAvcfHl

vI817JjuXmNgV4ZYRU4s/euolxRK+hynNLexzWCggQ
uerhYXqZ1lneL0bXEtK8I33Rw8GIGsbVj17mxU+fiVNOMnFV0W7R2IZkOA1dYSW5QBPitVvVu+uLfYnq

ONgQro4nYkdJUZtr0IA0XR926WuLYkevzt1uOb2Z1U7QnMoJhmyXm0E+e9qmD/cmQF1IBSyWdVH/bIE5

WWb4wwA43aUsuY+Gzo81FF1KMuK5KoD5jxcYB9+FF5
706zHLm0PigJK5MTQsMJCvK/XoUmL/AEO7A/pCJF4BXbKtWJ39Jnyw0O2vHcPNQb0vqknT1laXabQRcN

zLh0Q/QUxfapY0p5xzM8ZuuA9SkSKKGYo7dthE06psW1GoZDs8NYWhMOdpqPZGv0RtqO8mig+czZunn/

Dj2v88D1VV/3iUe6luuxIVhOQTX+XaMLx6wUuKPj1p
HX393w671OXSsAA8A1IdHRGYui+g6lx2iI3olQ2iS2YOQzALGhWQm8M+tWfYONUoQMyrXFuWlfvleLwF

eM9hoqnrBGEu5vYcwfiTFp6HIE1TcCjhv324fZUaCGz8pr+8v/73H9sYkjce9t2uwc9860JiUzOtv/0T

4wJcnsfLnO25ASILed0eFXo/PsYbgVdbNfuEfZovrz
aIv6J+2s89yT2JU8499BQVQ0ZXTa/r7eUO2mgrKYqqSPsyGAT9y8XXnHrvTNL3mk5z/xm7BDVqzDcSt4

+VMGf5q/m8crsXO8oFL7bDSPg7cCtIs19KutIy0VYY2qB392yB0M564Z6wy4R0OhPJ8aqW0gyErU8pnz

PWszGXBZBOa3hH2m8DfwD3oBWT6g2Gs+j86wuE8Mxi
K5wkjVjGyuCFu/Y+KI4sUchjbOverSjsw6mdKmh4m+evoXv5opa7LrHpmaXAPb506VhKqCEipncKZocR

2fpRnEItx7xQInVtKEpsTenYJXAVJmrU/ruGErh4Ld3zhUb4G81Fov8xVKpuuEfNhbWJlrgro1+hf+46

O6WBy8LYr+EykAgyB33EjBu4jIBC1y70LjDljnYG7V
XI06lQYz4WKYtKwR+63xKuVe0XrzlCNM4B9k8lpLJWr21utO4kBvUStSpL43xTo6ddBuNLj2uyyC7deS

991KrG4K60skWsz5YtmYD76HA2OgBHd+xETVceRR1Zl6C3+m0RT1LCUGFCmdQGeYy+hXvFqaJO+q28UB

bbYE7pYq3CIbhWIZ5/1WHzbrTQ9kGCJOUiIIt+tet4
X5TjWWgrnUmb04z9RQ0thi0bg/Iyq3oJHJckBFCNFCoXx0Mw8eqv6LY4bBDAtmOfosokq9P0AaC+Pb/Q

py6ikwS3b6I/0hd4AN6WAeDbVkg3J3MhkLCvHLZC8SBbUD2nAvWFjVYWTM8wHmRQyQI9AbnjOE5Yiwdm

mWDjjfDLeKqaMkBBqCJa8k+oxcaVk89F/TmiShnw3A
9hi/Ja3o3kFhX4l9aSfKB/7e61WFkXF+GrSJ2PBTQGo+bPo6N8GZukyg4KF5GnUCnjPh3ucUfoV37Jb7

vO3p+oaWA74qrO+wVnnpIln2RV382xJoqBCNSYZhJdUmHEGQuqjgZN7xWguDChaVwnHmKe66impG9zmj

KYTDrD9SIeDJ9Q4u/x3kfdt0jhhv/7F7NfkkcFgbn8
rIQy2CB4IIpWkpzfJbM86SO9ZqtOvZSmqIe56zOClDz2jkaDwfbCSe0Vax53ooj83zOuHcyMynAa+1tI

7HJliwBTxkEzH+Phy512wTSmjEEyzhv9pm+FY+F3aW8iOaeRwW6kft5gwH3/MYzmWEG3EykUhdQTAxmq

ErYABzuoPP4M8WxoyZlFzkV9NRL4GeedKHrXGPJ7Pn
6dt4JFJ5eM6kKRf5zFJvsDxI2FVURkyQaiEW8LaDGQzjYloe2Ez1igE+9brxhUye8esMj4Ht3bCTpHB3

wgxKnWhgllFpj4uGs0t6eGmM6DxE6cNVMIoqTp8hv1Uh3r3P8IkIuZ7KYM61kM1WGU/BlXM03Dn8+0g0

ez+bmI6SmB9gg7p+SCK8eiSk16/KlrxFm4S9a8FUbT
bQ8B3YhMYAza0Vb09VHoSfcwdKRCuDNUNXH8RNAb6k3LWosZyJu58q0MOcQhyyBsPwalVrWDQmyMFipD

Jp6aV/gB92UV6w8oXhepXy7rN8SNW7U9Ei+r+tRRPBgs51ug07gO7570lC/muSlwIONmoFZ00mXX1qWO

ERiD8ddWYucWDDlb1MkOVr9npXciYTRFgfeJfAZOKQ
QnExf+iEISYfvQTVf0wDQcTJRGuoh+DQSxttE7btmnfCeE64jzy7Q1orca0o8HAL8MZ0+fxcyiaIgNDk

+lG7dScUFCGXGXs7Yhvnc0cfHD8mwU4VAP7+uQNEfU+77Zu8A+TttTL6/pwhz+LnncdZa78omwR9JxzT

0jzDFrW/4cPkvX2RtZ/Gx69nA9Lmm0awcMUCY7jmYc
Zr8UT+bS7XfFW2cR6c7aXxT5O0u6pUnsbLM2wfScm0L+0BjJ7z4RDr9Xtf+pZU57nKK2F3Iug/uN0a0U

7e+sv+A93jXCf81qATtyKoX6q2Z2rCVzxdU6BtWN9DuvBHzh37+GwZZbdd1ti9rZ++w6V7ZV1GVaD3l/

PMhst1LqTnZ5muCRZy+K4tGKFJs3X/GAYEnSM9GOAk
fLzZ7RxZQRB9n15rHASRfcI1u1NK20AJJ9Iv3j6/BGVJVjNLDDIe82nmPfAgam1MLbvHI8DpGICtc+qn

Pu0SllPHeec4yM6sZy5N54rC+sJsV6u0w+3uUNu+IK6kjUQCt04DoANRFqIrXD2+ATWg0GVTOu2awzBF

lteXTC6GICykj2q6Cua0iZ0ZggcS7/IaMSkap6xcwK
qYLzApfKj5mX+ge9i2Alxby92hbsy4lzo4Ghk2yu+3Nd+hoIGlVEps2uPiV71b6nvAgiO9buj3rOGRWe

aYqsjb/mPKg2y6HTx2WilNsi1YmZnhqHYDtguIUKJF/a/07oKnoip8FppCBQ7u1rU11rtLiuCbsHqede

2qifCvFq9ZYegv3R+onBbjn6n2zaCc3kyfB4p5z/FO
mM8+Klt0ssrmwtEh68bjOvB5+U0jO2bOfH/SdlXM++OEf05/NxS0b2CycmnrbKCAtXoF5JuexgskEzBO

Ojt1z6ZNGu+BTHlQkoiRV0RZzDfIQaD+AZZoFPAYY57ObrgCMYIbVU3bcA5OTm8lOzbCQF3AFX/wuijn

0LtqB1topIlJFVbtGsb2CD3YoSgnw24ZWn5I6AsoeH
6D5juu+OlHWxJwMmW6W0cJrndBHNsidSjK/7fqzLIaw6L2dUHAY6a9CJGYki3LusPuJ3C8CGKhtIl8Q1

DrUbvdInDMOJ3uX3VuDuzwYgnd6dkDM06HE6+gGmQvJ7pPn+oO+Gg5xyXsQfT2G/Luy8Mu4vwiOuOtj8

27arNeTL6nMlmhUlTSCBUM/p/lVXXggY8ktp2oBwTS
BujUrjmU8ZWHpX7zt+S6xMnQZsq/Kn8bMMaGdvSNhmye+s88j/o+i4IRQRWAHchVBCs7Ia5DGizSHwyU

sCEgzTJerS1qJuqRBrY2YW4nVJasLOYxGg0y2tjPoMAbkJiuRT/bOi/bNcgmbVhwfN/tPulVTXsb2YLa

JNEA0kOHGMfLznFvSTExBSWtncl7BIzeOZubCuhWJ9
LRt10FB1sbE/MsUtFK5RZGFwtwRKajP+xnsQcRKGl1rnuO/4L3cGw14sxlBqbNh3k6x7smqk4qj1qAY5

54YpEJovpE/OLXc7QQvjhs5wkC/xVk09qo1OsmaO0j4zTFYXs28VW+Sy76AdRDTQAJxajdEbqqhhwLWM

EA//mz8dto+l1EOVdF0dvOIT9CKzDdRuFlIR7TmMFn
o0sUaVkavOHXFQofixZJc4dT4YgrT789NQhlFM3E2/k41h92EFN9bmqjfL7s8b4ld22Yjb77fVBXq02G

9odGeOZLpg7dMzlD0e3Gl1wKfc94z50c4r/ULaLbjZ0E9eh9V3c8YSOJ7bvDKV1JJefgizA78wV780qA

1E2dStrblEcDy69c2sm3uZ9luL9y1D4VMVbRFnnYL1
W9qODmw9FPu5x2MrTzyTuxpCrmKx0URW/hB6nT7wiEbos/O2Ydid8FKqDsSgC38RvCK+oFxD2TOfg4GC

wPAzoLjhzG2b9+JYVxiUxRggFGFhnkmDhEzzBCa6oGyuN9zMrMND9MMJOh2C9HZiovPt/sJjNGvKhpa2

zqUjjGMRCWESwrEzvoJ0TVozlUZMUmZe3/HQA9UpHq
oM61tag1RjSFV/29CyMD4NU0vxk3tmWMPqK2W/EuzPheKS7dtqHm75Ssaz/qxI7YvkkkHDWrUWOIbMrZ

sQrP6LPu3lUl3Jr5uWQxF2EN5T9oBFoi8GYepIlaGN7P5Z0WpgAZOnlMjkOIRGS6kYriOCLOGJgCTt05

e614p1Bk2qY4R8fWGfhrHyhcLroFDUHRYw00iB0+lD
8heB56XKl4XR9Qdcy8rRd80izxrYoE9vZCufo97jg9t2St/bQDJ6C6hU5m2ns08gcDTJgn4hNM+hcyLH

tl1upbawd+DAET5cx9CoW4kj4U7ftQV8l/hJMwjh6oxEVxWeLDiKpp8k44tEppyHyABreWf0zvWDb4yo

gzN1IgPozjmL3jHdjsLd25mCBz2sbubE2FPM8KDE8O
59kLro4z0eCPtvMjvMeu075iwgAIAgenQrrBTK+TPz2Y4bIzLkw3MED4gipuDbLVLEkWBH7dgkY+I9YA

g3kjzyX9RNoiP1HicHWfvxvo6ySTVE0hs5r+UR4T+dJqydCkWyo8GKTU51FSFrcss8VsUiGpN4UUgdaG

X2Nv10YJG6+r4g9fzpoRtGoSHuXs9prVDINC6RaaBo
Ak8cnPfLOwY3miUHw9pfjkjhMuMao0PGzB7hxtDjqHecTRMspzelf3YLhMSoSsYV5aHVs6hmLzWHVN2W

hq07JRXb6uQ3DPOynfVRiTMrV/Z21LMCjhtr7khmnOF24H9gw1yvajAL4UosQcfLDfEy4TjWxAYSmJHv

FvP8LZBVnK1GNutRyxlO5bANn543pp5zSG7gp2pXde
56FEP4wnsNt2tR1MValTGXIYVPE7JNEbbmcvkg8j4E78Fvz1r5oJpr9JZSEzYqIZZO0aItpzB1k2qqVE

n3VM37Cmg7F05NVMGnyMdwWT1LtBfZ3rnm1Ak6o+V3E5QOu3ftzf8DbYIq4HXjFWFdEbQL8aZus69T1X

8CqfqhlUHd1gvin1if2KlBguAtHPPYablXIFoiwU1f
eB6zZB5jpNiH43n6+bQt3CC27HYp9K2F7zLxz36AX9sqIOvDA38e421hJWumlt3eWS3B7a7HXXKQpTou

ozPIAyc1uQnUWqVPpFQNt5j/kQart2mqpep8vi9e608MeRvzf9+tXQ7WjtSvvpcNWJakDFzxkzkzKJyR

OOnrtKzSgGR0MmPWWXufG2Pl+WbxfLJAc7bHXp990V
r1fwMi6vLocxxCTdZa4vdnxRS39+JUAN//3HUFWt9DaexCeBJz6gPpLKOrzuA/RfuNz5tbEhyb9XJtrHc

ubJ31zRYvqDJ3kWA1DfRH8K5EuUtFvrTtNE/lsJwWMjdPmts0bVa5SfAqekhGXiUuoXyLX7wLCYKZ/qw

ws7Cg6GatKUuaYmMfUl4l/J33FoSgrh9dR1oWd4xps
gzMvFkLyAe1HYGq+7NleauvOKEnQhm5o6ZQqQVQ3YzxgNtz52iQuGk4biewXAjw0fZ7amyavGqPaWuzm

zyFWLvNe3FBniFxzmQW84JoBz7UQnGdON+6iBC8G1wmL3QEBTFb/pe0LchLTruO943iwcRb2UE8rDk7n

2rX4wRw8Pr+D08+pai9125R2YgSC3PshyqfSewRnAW
dsrmo8kdVtZmxi86M5c2p4ryDT6kNLlA34iViiUO2hODjgv4uV77yqFi1DnpLt+dvI5xfYqdIysLczlh

fD/Ot3a8lCJ16ljDqe4AypW3lNxQ2YuHaZ40d102FlEn1X/m0I6jm6nvz29mR8Jmy11XcJulsWEOjAjB

SEHnljTGG4FCc/Zz2i/hXd5gEjODLG9v7cOebynyIg
BXun8Vgb4u6KCSkwrHbAPKexHb+oEEuxfAMP4Px5Ke3dODO3l2B186TwW22PT9xX9UR3Up5m7qSzArPY

ezOI3B367gPn97qJqlHY8Na1/l4PT4d7516vxGKPKHl37pwf2LjnWGa0P/A9sH4fI1Qj01G59bwrrzKf

xd0K8Gz3JczcjlfGhstBrhg25GZMKcnIS4iCjumAE4
idfxQgcIVPXJMHeQnOu7PmdGzpGUNcR+C8bk1CnfTP8CNPmE4w4y1M6TfyY/c7RadUzKs+yxn2HQZDZo

bSRhVTF68HqlyfZN1l9TFtiFvU2pjyfr0daxqv9vH8tj+u5rBOyaGPnV3HHoImGIK5CAIcEhBgzLcJu5

HYhnUf3zNwMTtUUhViiBagyv/tqiiqFq3nYy0CbNmS
O3pHsTbpINT/BhQaBqT4vw7Bm8xOynoQDncZHYXlkjHwMqXxjoJD8cqukgRw84lK/FDi6Q04uu2N68N5

zVconKIiw2gf81A92Ng1J3lLPmlnYmsTudPyz+G0HaN2LAc+gofWO00MZgHA39vHO2E6Uj54xA8zL8Fo

giD5GVB63rTDygS/TvXz9INFBtG1VbZ++AK6gF0lSQ
j6hxtj1Fu7PRSvmFucRuHBGBHIh6QVeyyEVZaLPyUcaa25kHIu6OdgMX/czOto1KkRe/uFQOyt+m7V9R

pHjQ53ajC6FCZR7uq5niRaP+UwuByiPsOLSeQlOPLL3A2UM5ZwCzv0DUnCk4XXrZtFqZX5P0/UixpoVK

6ykMcP6lSabvQ3LIWz2x6B4SBU60+8L7b8eC1oEk+e
ua0ZSXlVs61lf1ba6j5PZi7UR0THaSTeE7Siw/jvx20L/D0Q7SH49K54POu9nQ13OlFSusty1bgwx+B7

5tXgd4VdSlVyGBGLQZvgupnJhFQoyY9eBKBeG52FUsTjG2sU6nfIvUoVbVn6Ek4HiB7/vdyMNg2ATdOs

gXm9ejv1DTULBo8Cl+R0a4KBji/NsVox3FRtHIbH7m
H6KZ/UfdD+T5aw03+Giqx6cnNMcJCLlyq8kAJ0uGYb5wQfY3CZKm6mWUMbaBGmf5jRa1Mz9HIr96PFpR

yiPbp7Wnr9/7YhTjmarQ9Uteumczs19mHk7JxBs6x4im0b+UVVB4hB4FaP7I8E002bff4BRYVeYJjzTK

tLC9FxLHEczmkQsxk+blsOtA0egP0IUHPVTlE+tTzJ
3hJrhvussWy4jDK8rJnAJe34j3gYjwBZ8aAwc6Aelrmyb5OnhfWSR6IzX37NTdsfwnLw5MxgPl2iUUKE

Y3ULa3IKn39hNZrz5Luf/31zz7kwdSnmQWtMD4nr5mvp9zpaSBIrTRd81PyPZftpXYxkd5odOydixjyF

H/assNKJqwb43MvSXBnSf27mcNcc3wIL5XyEggPsF8
KPcC0QdOmr4mSL1DimnS1KzfpNwznP5K9RrV5i9P/ecdKOQzmhXhKB9zTe1g5nePPiTd9lLSn6Oz2rE+

VaiGsXhS9h84WUrjQSyXF3MndEwzFl8+A18mHfvWEGbfZ6FkDT29wUonkvzfpfqcNqLDXmz1GfFawYxw

lYI3XNe3gFhBkT+c4szyZIsLaD5eH/LwI9e2lu1LGI
VjI7oRTpnnstCjN/bGfdGtBvdWNZ5Rh2y0phFZxRFGJAPhXG0h5UxbhAloneap5IXYULbKNGesb8tSMA

AXynl6Dcc+6ffj0Z2Lft81tdn+1po8+J1sP0AgHNsKovcumCXk+ihaAoYxiVG7/9vEDcva/d0b52aF1a

3tYEXX+twFUpb1pvyV9qj4PIW5c5a5F9WqjlaDnZiw
3yqfSICC6XO7ZyGXrdpaplepuiupwEcMtIsq5POV57i58vgzFHkoW91k0vLqk4nAi/vFcyjhvizULaSo

Ig3TxcsixUGDtvo0wKmIjujI/hg5toybGbCrIDsD8cuL0/UVdkj29Ewfk/kP1xRR3IKDZHjOOu52C0H0

2nfN6tSNCUya3lQa+lN8pY+Kd/gs0w7SbnWN9jDcBg
AOJ30qahZohx265ar+P3DaIPj7Z2Ppa4meMA96Wamw/ZXfKwXa4WI3YA7PHvyl2yJT9CmYp7jj9kASRq

cEHWTk1dh9JgNpojASDPPjmd2KCUqkZtqk1eAXj3bViW/8o0Igtg6TWJkEcj+wE9UxdRxZNtG3ZBhHrY

4TvYfBf/0QBRXPDe5cEL/7+hXc/sQ9kJil4kGwaXkK
Tbdv7samuYaME2hkfLa+0LUXIYFgzcSc8A5sdAgKbmgh4gpzCha+2JGmrCtFqfmqDF2OTnr68l4TiP/w

ca6VtpBHVN9V5dh9hwFd0egR7nlX1C+WyvpM7IQsCDFyX/LK45VejjXrxynre7GbY57dcqx6Gz66+8Y8

Yh7ebyzHMptvIk4tTine8xrHsDkS8Db1Ax9vX6dOJ6
hzJ2bLIBON+e0/zqWFjL0XEbc4hZ8V/7a9i09p1I92Mdr/rFqywr8DTMoh99PctYoglE7ttuFzb2wG/v

EQShM5LBJD6PWfeyyEcxrqIkGqjTxUpSYLPUM6Ey//m6+Qnvn0TTIneGy/EEE/OexMkajEV3TjUbZbH4

im6fXE9zWaOXN4v032zyxqyEruRnWOS76PqrGM0/cy
Kvc0+a/jFMfkQzsV+lr+pM6HZEMb5CuwVeKlekCCCtYEio3Yq/MkZVl4BGJMa1TSjS5VhQpGYHJclgKf

LtBK7svRHHTzW8W2fpL+8olWS+ZgztvxfIYWz0utrQIeVS+tT1WXQQeZAnbDW6ZCfRFSIlsg4zy5SgyP

tuwc5WEXkLecOFhHKZ6PitbM/Dee1MrCWDJV07lUbD
sALGL+Fb8fcF1aImNWWSHn5EKhxFTAQp7uwTtxdsWo2qX9+KrsutgWN9xl0CcA7Uyol5LoetUkubve1u

oOvuRl6N44i9uHnd/5oK8wFO3Gso6mx1Fq/SMbODhjMCy7okhP6r448iDUaC4Giwg4h09q7M97MzQ0Gb

0IXMBNpSmXJ2v0ubNBm3DLhWo5NIG5hrQ6hskH+h2o
lAe2HfJS6kAeE5A2VVrKDB0+VBJQJrAnasISUHNu4v0f3S40bL5RLgvKkusHKJ9T6FnBJoGke240ykeP

JaPLX+jh2FTkJE/E41NUv56HaOjlLS49YBIsYQk003dAYi5/f8jzE9Rv2VF13GUQbDldYLS+Dnobb7hh

K7FSj5CUY76oRzcvgG+Qao6usFJTSGe84Aukz9BjZo
tRXGNpEA/pPY5Rz9zN6G4lnwryeihyjUAeo1kvuUHLArXpa8q3X3zZFOUvLtJsLXTehb89hBr9EILQwb

MAX+/0SSYtUkgUhbcG3w6+3R5vToS2rsM1BzLrNOjXpCCdlytYmnQAzG1Dp/+6mgbGN8svNYh9jvwA+t

R4oVQKPxsw6Cvwfof7/EvxSx3twpQWji2b6vqNbh33
SIGjtD8t5ed/mofzVieCiN0cbBIfWsRavipAy/TU7NK1/sTY9kR9u/VIErWBJyiTHfFk3ryDctD4TE8i

6yELr9HRYk/lvM3UDZAvsmMojPm2r8Y9ypzQd0Lzl93CTm+D52Uc/ke5JO8vKghfmm1zsdbb1kizDDlf

RDxV/1TMnxnnJlaYnb1OjXRMWAlOylAje08a5q5Dq3
cFn+NHiFUJEX/sCGhmSu97F1UJHzBYk4K/Px+4/r/i/33vtntH/oP7bZPdnnyKvQqYKJm5vKqr4xkuWF

RJM/HlaMWFCDW+VZ+cRyPdPy/F/WaVcWwb864gP/d3P5yNJLfN7eb+gcc9CueU1wtEIAUl5EgcB6uVTs

lMs39JFhtXioqVyRMWJFADYuGxHpuzdsYktM2z2+wS
DZXabl7FSFrMtQoDXwaZ1+IzPGSSL/hyDeymfpEhiJ55oMQamMCq7iPOS9jq6W+mlvX54fFdfwbNmqy+

4GKKf1Y+/3ap1VOXv5qHK0FZ7IoTkiPq/CJBc2oY42+T1ApXajmRugCzKwui8DoGEBRFFOaW8SBYPVjR

yXM/r/J2aGjenfUoCxJFZRNuThj0MAHMIneK/k5qFz
TFbAzO6/yh4UxKlL+IYl4V74R9teObZpdTh3mK7QPI+vGg/oTSOGeYOEfx+DvSGCmD/NjTpW8jaEzyL4

D9fZ0KClf7TGupstJ/AwMtw7kDvMnCpHlnQeBNZZDzdzQqrYnY5O6VVZWJAi1R3BUiU9M1e1I1/tQsjN

sVY1O6PZD/v9IeWm/YTsFJjseeAA0yDsrtF4Y6+WXQ
MreL9oi2vxHVVZFPiojvZ6o1gHvr/NpGDlag+SFqQRKwrwIqQVaflTLlQPKkk8z3/8lFoP9X/yv+52Do

HBX/rBfJnAOIXg8BoyuN2LcEoEapH5ReHh+QRCyMb0KSXwdlwU1VkTmNbf3+6rWlqUU5mWpv5CyBDcYR

3Zt5ZmxSnI42cyWoYjsSJxY683ZdmHaC08oh93HN/+
v7lqLS3Mee8lPjZaX6tYD9PROI+nZ8xNqhFcJcalG+yalq7J6exIDWg9ijueZ4Ft+QCxYo2/B6jQV9jh

mIQxAn+nArexxn/yyDKkl6XMlxyDh61UB6VPrPFGL9AFb2ZyGWhXXMZ8xdV6B/rK04eXoF7v070e7IwU

XRs2NF/ciWIw2b1S1gd2llDopWsgavHHi/xbi+wTMb
4qUIlpot/+rQTxA6l9gf79QKzulwUqXTesWkgompJyKJHdxp9x7/ds8FMWAkzd99Kh7xaQwdo8tmg4Lr

ZELa+kMu4QKc64grOoF+1U1VG4clJDtVeA4D54qGvCDEBWv6Ow+YLrqS/XhzuqavbLCDbTY3k63Lglm8

QzWXr8S9/mtW48vmOUqRnkqP7mA9uV4TGezs+A0+mills
kpaGZNu3LarXjxSmwCK8q5w4aQrb5G6Q7g0EUh5UP5ttz2YZ8GGZuRgzLb6fuBQvUfI16jWoV7q3rXkY

SvhnFJlDx0WRdbpLDUsvPM97ICO9wnzN4dWT7PsGGYcUk98GnAdt36jcSivoj6xLRR23cKKe+opXaynS

maRKnlikTDQYRQAMD/Ku3W2UFkoTlnoRtV+6R+Bwhh
rL6ZmSCme3WjVIrS1p2P4atlBWr95rU4wvX3HYDmkyYnI3Ldg+yKGTTUmVaGbt6oo+bD9s3jyPeCkVv9

BqfJXLfsIyhKMWyZTa2AuUbQmXiOtZ7xWhI7HpxWqrldfU+d2n6MKKxoHCu9O2RJDtzSzFj9gl7K6ei/

+5uO0qfCZD31fqJLVfKW2bHQ/2y2Bqq/VKRLFCfoxB
s/O9O4Vb99toFXGIOjh0mRSj886FSipx+zmuFTwjqG/ATHdNkl0YXNPoJjd4MAiEvGnzmIKIlEjCnKgA

OSDcVx1TLMAzJJLq3TLqZ67z7SY71JN+pdyOnFcfVZHNJpkFeWFTa1WEHpVHCoZeaQreRM3LRb3yTUlP

ILkhOBW6GYreTASZesyIZvUbZrhO6gCuy1kWkYBth+
sKS36sCdeWnytkaA0TRQZMitu4CNr7tNbjHBG8E0YtobiaAm3Vx1s1tvZHmSIcIJ/f+HY5f1rn+zBugL

CzMm+h5te6ru5F9CZnDtj3h3BxCwK8phsO0BmgLkXZhQB2vPInW+8+zqOKHjb16oP7jHLWxhZdXEQXAW

g5LZG+6D4tJaEsyAZLCrrN/xnx33/z3e4cd8v2pBgj
mJJqrX2bNJ+rfZ9zA9RN2hicHjrhm7RrcvOfKFwGvPEgrB7xNF4Ki7Q9GnKrEDhVt5NUwuVo60BUEfdb

4rBvgyIgYKh1OfTe+jzL6tQL60tcUtDSym4rd6D8HfgD68Y1p5PjTJ0Lub91TCPmD5KDePZFst01q0GO

Pv4yCgtc/cUyeUSv++OBI6D6tGuzP5B1wWXjVjMADT
1UmmoUraJVsbCTkttkMGXNsCQZYgDQ/DyYw/9IWBLLxIDW8XFItCWR5gINcEbcBbpapRDjTv9R18zqEu

0TfQZgtcSSnHwXomvVMxt7QG7ECUqWmA5OGPLc4mfhxgzpHBFg0zO26ntEhorvq3PlrNDA88djiFt5Ud

CcLDn7w4IWuCv8t8hHObAKC1p7Y7yVP5cxe4yznNjl
CLQz0GdCTGS53/2gSRoUNX9bf9l5Or5VvMJ/uOPDzkJBEuGxwtVTCMoGgw07lGWYlZjtHMmLUbMrIdFn

r45ExNhUu7fkT1wV39e+a1d6r0CiBx8Ue2VgUeMgSMcqoZ4DcbjxFs3VyjJVZIZUZAdnM7wje2kdZYXQ

k5cpkMNNxL4sbZl6xcTvBdR1jJaxtdrjeQf4Ibc8qU
ttkjhKjPvM3ajXD8xB4G29fFsS0nubeTTKw5Ib5YMS05njpRGigq9D/CZMcO9KPcqVWoEOF3yAqCjeBT

4yTIN6LS3cnTsrXc8E4SWnU4Vl/4sNsULKtD+IcjGtViGWl3deMgvqTIpndjcbcIC5J2Bci6dG5HqjZI

nHlOorhK61FdfmtqdiycZycM6A1hQxX6nqZN8rFSdH
ZwX4+oWUBmxyLFX7LzYNR0uXA4DxLX83K/vtsWezt4iRFOaVWZUfAjo11s9rynBp83LWNx1L9Rk+hnrY

0ATlFve2/ZAmb5rAgTF+I5FhulDNaykLNuPGNZBICQ9sqpO78BUJONXmvYkHIaszstZUCndXdgWpCXF5

MMrt9dOEWb40c9N+I+JxqnQm8HRlt76rsa7J1GBWRn
JbGVu26dKaRLY9KgX7SpfItIHMEgSP1+7N2GQFxhB7uNNosWugsCqd1J7u0UPDZd1hlPsXhrBL9v+xtb

yU9P0H8cGOVIeduRfVSAsTf3UronDWHT8hDakxwOcBHZ5eDlr0DTdRZJ3rEgXOxuZHf/fNjV9UtqEy1V

xFJYHQv6sTzsE7Zwu2g76XiXjMB5LfDJyM39DEKTHD
ZvvUqeKMCiCJd7ngBZeI+CETbGaS3cZ2PPa6hdU81Q8P5ySfKLdcUsVGboR6aCKITZ3AB6TLraxQPiuc

/PE34xJHtCIKm0cvQGMfretJ1za5x2KFUQrBHUu4kZE5wkmxN70yg/HNs7ktHILaW27HUVOHag4wdnb/

h/3c+zYyXwFk+5qrLJ+iVDxHsR8rJhkYH579j0jjxR
Mi+I19HY8J9vLSxiF7dJR8O3drOX7Goj2lqq0hD/8H2KnJjaK0nespfgHIKRi6wzC40A4Xb/i/iQypEs

PvAGjHK1PTQy/8RCYrIDHPNSrlFSKOUOtj107COdlNO9wnoI2c++S70BycoZLq10DLJsdGtJnxC69b2D

JFUsStLkaEq5PGhMRrHBedX03z1QRRiZjmeU6ZcstG
vgotFvayAlVKUKH4WFBDURNR7HDNE9FgT9Las5uEtPPq5BYis9FphhE3ByFrdauju/c5Sj0IrFzdkKI1

YmGXH2MqJH56/H8aoy8qJh3kZcFs0g7J/SKtm7/csFGPkzWpEwLUmtV7XZ4xAhuUWgl7+o5pq98J3upJ

yxv3E+jZtEbdZ+xyAQHA56zWWwe5jIvHJ7zkEkF/ZG
WVKWLjtZBU3FYMnkmjaGlUFE+882DGKL51NEc+ZGIE5OAYbNGffuIlhHOL7tkSBtIYS4t/9Vr01FwF9k

X0eaZuoe0mDSsdcADq9KJSUUKCT3OZpAHQoliRf8lVYUWMdqLWRQAboo+LHN+4R9RpY0PqKfFVlAhNoP

LzohYJrG0GwLfPsWe7j5dm0Whq2HgAC8h4Fz4htIhx
jzdNAPZKCn5c8Q/UJw5BLokSc6jpFfbuH4/RAKsJwuYgaI4rWgG3VmlV7TsPm4g7Lxm1ltK0TvbGgeCg

xF9LTlCpe9HqTmlaHVQU6RlQ79flaaZAWSdaYM52t3sfHMxDOUZUr7sZLnX2ojBi1ykYdjwedFDcQ71y

OeyMz8eF0edkL25sSbBrb0gi61vjQQo3cgF4pizAi1
dInq8RiSSg32H4VavdtW/IvESZU426OSAa8L9nrD8yT5G+SdMJT1XZG9KCSFIlP/mpSfajp44zqfFXZG

WTvn/nqbq+u15JeN0WreRlWuc0LcJQlAIipAq1gfQOow4alFxQ9n9vt+o8SaoFPf6HB+y+iNUa9RyBE7

DDOxiMnk31htzHeyNdForuIgrwGqMaIsD+OfPi3lfo
swx0TgJzq/05+aIPMH5D0iaOlc+PqUunRYoTckDUz+Tienh9/7dQNOdv1hXdTTr1bFb8yLgMNJimIVTE

f1H7iq4lkDSFtj430k90BqN6SHx+D3KZa+Qsg+I3S2ydzl6ntGJVuqy3lxD0F+rDG0XC9N2RTaAIWA4/

Lo+OlKqs2mBCWAQs8UBq+P8DT9UltkzDjiABGBOtGQ
BQR/edPVtifxBDd4Fg4JQIGhr5MTkfxb4IMeOKTxBnkySff/TAY+32qeoG/IkjcsZgvdSa9FeYbGKpb

cYt/8KluI83byATpZWYkdwhyzEMyphFqJ/LRapvD2/AS+N1A78a8oren3/Im5ZsAPbI//+Yejqx6UD+T

dF1EBzFu4b+tgBiLnGvY+XQACgBCoOruP6nqI61204
rHrv+Zj8xTliGlXi11WcL20D+PvozE8TalPgCoNUMGqYtzxhY5lCfdiyRwaKYvlETxRJ5JD3M1x8h6yJ

prsdnw0/k97NRoyCziG67gCegBAEYhtP2TSOf/m4mK8k/Bv4yaDsmdf69iqTVtnrHNoOKRoYf6wPdRPD

rUwiLBldWCYjT1aBPg+ZpwNSKhcYXEzGRnhegrqIyH
SOCQfdpCENG/WhnYZ91eBcdDMAS8tORRAo/yc1qEjKSBj4KZNggohRkNTIGzqc8kmG/wSUiAH47dIySE

mlXA0NGxHDiYyb/mvPsuzaif6xg4xdWsGpwTf6qnbPoL82s6w/uY9CrJECGZN3aIYt9bUYKcgjtS+LxQ

jVGdqh/1aRnohJxP6G7J23qqPuySvoByZPGrnroTOd
fA334xNFFRqcJqFGruj7s2/LXZydqFHRTsZNiG2CmtSu4WYTlkHqsSuh1vLW5qiXD1vOfOYk7KftYcp/

CW/vVQk7UDYn5OyF0Nw9/GjGOax12mHEbnQ/oZ+BGnm3gGSnGwESn4lBvizIwEgrg5NWI5NrGBcnRafX

8amqbm1NOf+8LuVfKVbcqLDZQxVdMcokxaLkQoiauz
KwwKgNlnrt98scoddq8CODbgcRkIa3YtiJR/sPpMXt8V0VBN7bQiLIyEymdUFIxkly1eJhJ5kPq7y6RF

Xmh/OWRwRiIhy4SQclLI210N3et7D1P47Ow2IONzSbPyAZ9zhPFmqQdWGvaI4yPTVxRX0W8PjNsXklMv

gmKr2imopDRiPjUEMHr16w1UZhWulkM4sqvk3+RtFe
ig53NFZOwFzYTLIO++UEOC9zZTvSVqHedS6dURGBD+eLFHVG9xnROqt/dl5tEqGEwT3v4dXqA5Pn8/42

A+ipXA+s3SXCPn+m/XV/B0LT/Fehrlrj8ubkqDe2OqJn6exP+76k2mEchCdyuDkbRjE7JNGzIbqJarZO

4qlbtNN71gPrdImkyYks2EUi6+3CbvrB2MDrik7qfK
X7f80p/bXap/nsnCsjqLEuC1ADUSrul5i+AqzXPM8Ms4aD8+HvwVPwYM31tqUlIyX28wM+s3WQz2/Cxc

GRTvy11CJ6SrjqnX0R5xrF0MaK7sYS/ux1RziU269jqtHeE1jq2oBuHUEKv+wJYL7J1XNVE8lipEQNjs

Z87zNnq0MKacv8IZSs8Jtj4yDVyyQ3pSurduD+bd8b
DNB0/ArpnCibCLsQr1Q8McBHeFsVYf96/iIZdC8kTvKtA9COOfMmHz1Wuuq4YZRMT0/30islbI3DjJKN

EvVoYT75LoswfF0n8CnM6wKxi4Q0tOdZj/gN4ORjsqW1WjAW6nG9QNZF+a6+X0E4vLPqcsdjOvEoAvTr

1XhuRP0aLhtcJJhKLpaXxyKe0hfIgmmEgb7JpORk02
+zY+9Xryw4Akxxsb1Un8XWgf75R3D7gz3n/rwdOXhxSVZ3/WWh+5ysmHUn4FhsZkr9faPeUIQ9vYdqq+

LTm7UFXVu1mWTYfAaZIxjMO/wsfJDGIovFayektG1LbFZMix42rNqneHMMoF3GRFD1QTod5g2+ShaAcz

dPl8+OGb5jSN2l1gI+YVI3K0KJV5UPjqnWlRtm3we7
qAb/s3kmzAGr0+t54SIpODxRd0BCVkqsOJU9uWwqJ+BK1m21jHk8OYr02o+dCceEROWEB6yUG1Pizdia

KZUnlkQVVJ2gUG49XROcRV022JuARYaa/5nlXZ/Uni3G55ATymeNTPxaKf1bE/keFk2E3zZ2eX1KQHeU

m9xsz7QHy6wWyWQAZ0daXaJh04Enj1vEFM1XCAvb1r
56RLQfTzIj0eJNkf9MLX9c70R8nzBv+MN31n72lnluP4ACysrW/J8aXYulxjxjtoY99t8Bsk09KwTZ/Q

XNE3bWhbuMs5vx8Bteg6fWsBo3saF8UWrCbw4H8KYjEm5YlrYxYwrHNkFJYy8O2t9nJBMhwdf7GfCiyh

q4PTTi38YyO8jYq5MbiB43RHXJixeeXhAY0PiS9252
sjtYfMoI2yB+hUGxoghKQasXmg8vXx9fY5291FNdKYAO7puVUh6cF1Tp69CfaR80DN1q/0XiOoVYzrAd

zIxlRDElsJKvRay03fUCoVfp8p0D7LXWx+mO1Qd6fh37A3c0dtVzLi76bQ9kd3ltL50qOPD3Ubo54ITT

2whaj6N4bPF51xSKIo1NXVUNkh4vQ1G2LQaIB060so
69zV3bSIQjIhsDgEFjbcbdmjktZoX0wi5Ld7GWG/r1u0/tzwcf/5XxqTytDFveoxq7mogat5+Ewx8uG1

e7gdzs3YPj9shP/711h1EuihqmvTh/BP40KvoA/bbq3m5bSEsfOjY0GifUDGi0Moo/SkJhuoHoQ0KiAv

wPfsnb3+0VsEDcq9wc1Mfuk1i4ZL2KEzs3hHkt4igI
k9zkxW91FTK5bMViAe9myQyENJ3pjrV0nfBRa7PtzqCxwVbsgAv7eB6gGZ4NUe8l8V/zxFTpXsORuziD

7LEf9+XqiwK6nlfmWHgxOt0eCQ29bwvgLpEUCVBWi+1mKT3R6y1eomWypypDIlgWeDRWdkRgYYgcgknR

jcc1G54PVCqG0F2dFd3+yb/uiotOQ1f12d6qf87K4q
CiTYuL7eMZGXABtEhytXInp87dKkKD5EqYkhdwVcFUP3PI2LOsq6FE/knkUxCHHAo7L60/J8uRldxD8y

N0FvpmG2DFbLfXSy+cqs7Yl98Hu33qtN4TMsZz2iVu74p/W3zqoQDkoIOgYUpv/31CWKVtS81+dbvPUT

9Esk8REnhlxNQmED6HwnJQ8Oq1r/1Zna7Jpj09dVjx
mhjbK0wcFaAIzpEKIMPXK0FzKMn7+zbLII5HcLvE8t2limqx5wPYiqqzoE1Gucn55x6E35l5TEHm9QwT

6/EYet2wXN7yUIEUYOcOCG9ftvMBoo+EvKSbwFOlCwuqTrWJpjjJSiBs6nIHS5hlSckTruinvqVhIJqA

rRPw2T9zMljG1YJqGdTiSFkx7Wc0kY0bBBB9rsz8Kb
m2Zw0lO2r6MFmo4Fpx8DAPzF3/B1zgMg+zYOpRDJPV7Q5YTvyZUEfqre5Q7fU+1v/d0QJjBYe+SuVpmD

R2iQl9yfMsi3aG25mql8MemYNVsYkGkRvDFjNLmLGxRI7iUrJtKbP4/yDHtxmEMbBYiu1SrkoIA2sPIx

QN1pv9BItxqtGePw46EeFasiU1jnKGMu4EhtDKbw3a
CR7ZvC4KKOLkOBgxe52nHDSIcFwoB/c8IjV51bUbLBRdpLeNpMJ4POGub7hEVErs3XoRM2ruRgdJ4QRv

LTuAusNtDXN2NGIzWaCvDf9NYJY5LyVgDKvt0mPi9iGmF3/OFNbfMGqOin6/iix38H5kWNhy+sx2/rWC

837gCXgmWQHjO+rS0JqnbANTP/KR1drKDcJkSyKCVf
1JcgKrOiWezWWrYMOmB2b+VHUH6vgq4TVJbvux8R/hfaju44uFduMQfbTAsP8w/UPjABPGmnH9hF9TeJ

7vOR3WY+fahbYFezsttQ+vivhyBfxyXzmmQ2V1jWbMz/YXBpG2pnqI+++OB5EG/+nKM0gLDNJ8GZirSB

Ev9jdBQ/U07F7M7dnVsJZVGAiNQ0yzk7m15EzbuMIO
ZEOIkEuTQWux0RZ2lZu7cOYU5E+S46gLXya+K2x6X5ZG/ciwDpZMi4751NRO8PAKLV7dHFxFqib66FWa

A8h80EYcCkTkPH9VguKPCE17NcPbBdT+qTa/HY5pH0S3ksxwR4dd9SUWpxLos/88vaoADdAUx/a4Ytv5

oUZASik0SZgbHlj6p1uLSVUQ1KC3i5/OeZ5Re3Coua
eDO72MrmuuMzkOG0lxqpviKAGrhz5IZigaOlYpAqmGnm9PJertXZGCg71o6kv2DbMo6vtAO4uFdHeERv

xOaAhOOKRyraXyWyIufoDFYoBMjy+mJQ6eYrgDHuc2b6zYhjv5S+UpitUFH/uLdxymwmywBwPhBPl15p

kbQV3XV7QkguC6UH4Y18jxmXUseXGNoHEfHocPGy6m
3PkD51OMRnNBG2dj9wjWShA9uiT5r86ahvn5PM4yWE5E26J6xd6Dg5iIqgthCe21G4HgDvBxSc/w0py2

E/uIjC3vhTyxVx1geqZxnv0/VOPlUdqxtJPGAJoyInpdrGmAqm67UI7AKq4kEiH2lPsqmNGmy2ix2A4P

YE04M44meYSioXCLnloxD5WnKtKhQ/kKdJ2WmfMwzB
uv5yr8bjeld0VPZ3UbCYp6V2h88+QHdj63Il2avcT3sLcOALkKbdo4PamOS1pK8+VTcCL1behJg2yCvT

c/GW0l37PVmRDFtCuEkmQ50ntYt+mH7WFjA5Pun8dZ+gZ1R/MVNJXFEqEC539chn5ss3gMK8YPUzfUQ3

MJ2i3FiP6fEI66R3N7Lu7eZ71ZgEb9xolk1LZ3/3SC
CBfvQACD/B5cW83oA2Vo5z43RZao146C/EWaktggBau5kH93r1lWxvJFyGKtNx4XCkNggdylhUN5PD+C

81vKOtxL/1sV7mIXthqO5Ktn8ygs8WmKFNTuFMVASPnNzvX7hZtwlMOV7kLYw8d7NJjzn827f+7cV6Tc

1I2dTI1Mmz/9/ptlnAL0ebOSPXC7laM0xmhjOeQeXq
tIgorEWT+EV5l46B1FZDFi83F+y+UYlzJbnnxB7NfEvhFy8pjL5jTwZex+eTzxc4vRjUnctT5TgebIYp

A7TnfwTqiarqBXvc3RM57mCxElD1dXtlRo8N5Rq+LMBzquLxal3CIIl6PTtTwQpue9EN/Rwx8c2kW+PG

cnFaIEoKhu4WWCpDvtnrXA0gLgJAooFOQqmBvuxCfL
CDX7Dx3A6yUMfogTpmo9hMuJA7p7C90+iWyT0pcNko9lr2aNwPnQxJiinEb8y5SeH8qgYe/qbLBx/81y

rB73q3pUrAdTel5lz50s2EhiTJgbvhQL4wGXmyYm/r2VJqhyu6EjBt6tpUBZzWqa0WAhnTwKWRSLNDvu

6Gphyct3rw6H1i/UaGlrPzzpipCro8aUM9rdLxJE4T
onMoEOFZMCNVMMxTAkUAXrylrm4g37cPux2FYGCYzamCOE7QLaVxjJBp/xkMz2w2hPEyk9dIPvEG3+9l

w6RMaw0j5J3miiPAxZVN64wGI58pl1m+pnnxuhbSdW5tgJ6XcA6CpAWeW3EV+NCeAvcuia8IZC8wLEVo

9hq96MtkicZS1+CETheut2LsLC80mCELazXHgavp0t
IaCFVr2mpkAOziZofI0Utsgt7Y8grO/NZl+eaG/HC6E2rqjDfvQSRKXj0WTcm6Xdlk7Evap0Y1jzLjPg

Dxo4SIsA7yR1FoT2ZLI4EwuCpuu0rckRO0f0PiBBG+9d/a74haegv8+uQh+pjhVD3OruLkS5cXEsZvnO

2fgf44rNmjsGoPga9TWJKmWtnI6Nng1OljP9QbL6w3
ScFsdP7xFLDq5V8kvqroKbDm02hMMPjo8dz17QPuCo5eOTZ/REryytQLh+lU1FEzs4Ae4Yfo6opsD93+

S7D2DMZvFweJzRz29BnaVQYWCuuykYDQ7mlU0JFvOwurmFnRP7jxi/zJPLBR8/5yS7NJ60L1d0y98X9L

KQarJN+Dkue47RDK4ly3hOeuoEujRsrNx/gzTqTSVz
vP3yYnwtwtHORqIDQv1ZAeesuW1Euf1ujmMxsIMI4ol7L88Y1mCz6gozMkRMl2xn4jTPERY3FgBZdUsj

VFN5Ix8Dp5m12xGNQv/bKlX92ybM6D50DQ4jJdnLUGsJTsZYKaQCpq7IO10Iu6k6l2l6MMDtD0nDtZi3

1g6qldcZ8oomAZ/0EQHTfoWrrawFfCyMTE0ODpBc29
skIKVV+rE09m/PIdQvyZPvFwZKIyrRMTePDgX5NijrJP4oPbsfNk5ysqyIhQf07Q4Pb+psFb5dsmSaS1

fGFKDxR9l/F1AFitu0/xNN1OItis6vxn6y5sv4d2Pt097sfZZxPDmRsVzTH2x7ct6HeginkX4ymHG05V

O3wowfCYi6c6T4ZBeE/u4EPChcVmWzEiZ9wPsTTXDP
WLdNnYVeLwlT/J1qmKp/3vZe30Hwcb/ZxWy9AUa0KJqZStE+CpwhKsvJziPkGQKrhArPUroyR94074oU

6bOWaCyDlgFxc4Mk3v/xpbgV47N6Wp2DHLKxexkODI1s2QCyn4rQ/Wuf43rKMoEziAjpGj0JL+kIgj4T

u84Jil3GC6ak0AxyYMLTU3MemSECDPN4/T2znYVsaU
ljIrwz9B/W6jaD3hn0syr4oBwBk1Lv33NwqQS1G7onYAIC6dwXftuCjyR0+9gZ4X5d9vCUzyqXxpKPS/

0R/JskhVLZL9WaoOd2j3wXNqBU8IiRS6WVcEYHNEReiTv4yT1HNGciB6PPnC9n3zB/2Gdf75vMznJDAc

XZ8vCsuUQDhjyACKPvWycrDlV84J1cqWcWT7Bt72eo
v/gMbjfuC3dZ8yPDIkPfMmx8UhDTnFgOacww6qyiGVD0p/FYZL3Yf2n6Ac25LTMi42i7Zy3w65t2SPmw

Qsukpy6euJAdknyCyFEBbMfs9dDpRh0ro8Pdjztyxpmm5qRUgHJAX+iKsdjucGZ4RWwM08u2UGdFlHfJ

Tcmde4kIa6CTsUJ9xuNiefsGDgr4zUvGaw4lkk0aD2
Ht/TFR1kykxuKrsKwgW7BaA6vWkBPY7ZwFPO9A17JJevx7hmlGjQYi+zJvOImlF5PXJRHCNP5lcrbuiu

bzCfgTpJDYj8YLvpevZ++e5H/eBFx2OUmOl/gMBz+hefDH7psE6x7li1pWR2ok+iG6mM38IPTkUl0Te/

KjJsrYTiCNbA6epqj4QIyHShYONDlPqeBw2Y0hoUFm
+Id/2Ado+x/v4p9uKbn+E3gAToeDZMpHErn36asdz5wPsVZqcqM1E6rNc2pd1qG47DpTHEVymJRFifvB

bvqlvWJmBzipbs/yjitNMK7umQOhcvWSll+gS8SmGOGDM7JGdbzkzTx5A66HKXG5x3uF9xiRKQmXb9pw

U6poSFnCLDB0m7xv609StCUpNj38xcPZlUY8W3puAQ
nUla76Shy9IFWjJC/1puydM9GZJ/x3VRKHsS6sCyGu63HHTdsWSAoYJTMJuf10Nw4OT7PfrCRjVfvq1O

To09PZKQW8sWWU5kh89JlOsu8+775pv/gmziY5g/e69n/crMU+kPQuxP1LgVhibFw8cqg6vwQFRW3Tdx

pWe1Y8c+AftJkngtucGp5pW7ZfXAc0jzs0DmTgBa86
V34S+3wFC1FKLj1+d1g8pVwr+I6IuQTeUBF42xqlk3RRJaBEJ+sb4Q5HR4XQ2MH/k/lfPDkL4VK8uKct

0niUFvMR8HofGAClh5BMGkO+vcQKfGqmReCx7I91Z52B6buxdrUos9iK+m8vebDJ9jYGpQSW2VEq4/Fp

xIuDNDlufWxYzGNHW9Aq+NkQsyAxg7SEpDnZOqWOCv
m2BtpcxMswXF93CpBH9Pcorp90GJiEKbsGm9qcNfydXYu8okC8oB6DhxuVrGjRauhQ8BIS/OjkAOmrsp

7ir94X7wWOLbhPJ21RfJQhLqOMNuLLP07SifdqMfiB1qJdizNygrNgFyQ/QHuBqwiIsroIEv0czbOYuG

v/S8O5aRhmKa3PgfFBMDd2nFl/uZWdm5on0wvDrfpO
kAqu63eDYI1LZWVBjQIsjMFa5ljZd1UJ7sysHdyH3jcpNsUA7+OoJLNRo5OWjwsRsmPjUS/w9nZHa3GN

XnH/JnrWnhsy6yYXVOi8jX0dARWfHs02pxA8t05bLMl63a0fARMzwq5iDFyHXti5m9C4RQ4pNqKUnUM+

wK2vHOuyhz5P/l50S0fFd9scDVHLZTTVRFm+DI6c7b
pvIMDaxyAmI4YPnIf/xSFyQEK+DSPTP2UDIiNqrRkqUhBHO+DS29gHl+4hPP1Xht3Eu+jbBu7ugsR/wG

GA3IvVgNmwJE9EPNNkeH/kiLlYE4qMlVL8VbCVVq8ocKhZ8e7UNT79mZWahGMJu6y7RCLUIzg3m48C7W

4dVjL/C35abA+6Z6S6jJ+BIbSex0mDFWm8agNW3X+m
YY5zE2NLyTUgyr1fvPGxP/M0C6AvMfj6pcoPX2Q7pJQZSLYTiyaJwrtdR5YiKl1QRORcCd1pHVYWXG4q

j3fv5imuMl+mQfPems9mK4w585+IH/Ls8c+lvsM/r/vzdQ/t2RydQQReTTa9CwvSZhH+ZJJhWDclioc9

/Db2yWYoQFIHc2w3I11+OnoFmnkMsAGpKycoB1MVNS
oHIFA8tOwP56A7kwW1G3Q6UWiqpCkTbvhOLr/F8xdzTr9ybKHm7kEW4xe8/sztZsSFhLvEvueVgeYZXo

7yr4nv2vSJFkQdcfskCcCyIPjFcgeaHfAytK7gIn6rr1Atrbqry9TXRH8nnc4uEZtrFwV99H3dQqKUrY

m4VLwoIKxviQ/fnlY9HJfNoN8x0OvQLOay3i1+gBBa
XEi+FcC9Iy5LekH515J4sboFaXgwfGPWA93ZBpq9aiZ/Kzp00RN1q8zDdVAZ0RckAHGZsJIauALolDyJ

kFovbWDGTxZmMeuXilpb3VeA6fmpnnJiuDNFYjxm2laF/FlSjUSuBKQr/TLS7Yj+VCangnsmVd8UGXxY

4X94HiQpzMVdg8pfLxTcaaJnTyz6CGTeEV4t+/Gmuq
dos3sugQ4E8M2Odufwb85bNSwDg5JHoqN8PKYnHSRjqW6k/LqCqPCqcMWP5eB9RDio6BGQ/zhCMffrzK

M43nZZSKA+Fdt40W+lrFm4ToAPCDDx53Vio676lcgd02visxiEhy6JN4jl8hAtsnZ4vOtr0w6glM8q0P

pEqjjS/CoJFCBPadqky8YxkBXzi+8Zjgooxj1/7WbA
U3L+6Z+gLrju0jT7JOWGRYJTsMSm2pcW+kWmijTNdaqqIeaR3nrp9qbXPCCndxR1jOX++VTeyxymIbQk

v3Z6rxDaPLZenHp7ZsrEN4Q6k/+xcrIBayH4x3s4XaW5+EqyuR9o+9fEs/3AJ8rqr3AV67SK7GZx/32B

b0UpcwOudyUyvHHM75uBRHRtWU/hkcdc8KcOFkuQxQ
Gwn7/r9OgkhS3JC8Kd33hJz9FgVkD/sMDOVDlmAvnDKFykPOksIqFGCCevJdbsdcajnZitkaFUQA2XkF

Hy/c2cR4tneR2yGUH06Rdn41qG93HkgVJf3gvT0SSTrW9msf48vA6lML057KLV9Qb7/Eupqd1l+yCVFm

fkcCR4OPNe0IQH4BVV4ankTrHf5da8pdhbN5NBz77+
RtHHTv4SdbPK5zfsYOzZu3SwwjGSCoYYTTt6ftqfUCJq5iWo7V984e4E5kyqNsLLdCmwhe9KTlwTyRxu

9YQV5xpRmal0OvbnS5qU2E5VOn2idIgqk/gpMlyAtCgjiVMsJbb745v9EP7lXeaf+Q319+wNzRuE3jMQ

MdIuIGftcFK+j54KgmP33GKzH323Nr9SwDfhLJlgY/
Stwp7uRsI0OcWS6kH7s3fBPtnNnxC5NN3pYsCu9mSFvZN/Tc15pTRtlAugrh9mdwLYBLnheXCW/4L/6z

PPKVIUApKsQT0A313pDqlYRQr4izHNZSUBx93VvrCnek+o20ZYtZleHA490+KgCTA3xhsI8+KhULkxCQ

KBAamJplwZ+DxWVpZHv5x2xSUF2SAM0a7Bzz6qV/M3
vW8NOanpGdLP4sEvKmDs+bVUFi5qCsMkSPnyyzu/X/7D4o4QzQok+LvX/1LxP1yzlnaZH1Igmp+Efj9+

CHeh5HagHwR5aRWxqBROu7QqtpIBzHQQRMItc7kS4AT66FTZ80q2qCUH4dt6prb5fFb53y6uIU7V0ISw

rMz6F8QA9Desw4dhzEqrUg38h3btSQy60IuMtN92CF
lMsrc5WHCx3lzMiFthyJciC5lmIREm1+U8UxdU/h7o8VbcGOmlgJidi80tXm5sp17YUkr3dvBgafBrTx

k/4fb2MOf4fxYAf0Jg7mDIFkm6i6Dvy3qbSPYQC5Ze7mnhpa2TC4IdZtfgIUUlcQXhNSUS75GRi+trZB

wVGbzCdbwkuGggtWLUDebhFw0vfQ5CfsMlgwPJlkSI
obsqsIAZLT9sZ0j7iBkW/cbvMdsUE1lXTJu4/4bt6Az7jTvTYTKDUwTJXWZ4mgq/CiWSmuq2lqqwYt3M

MeobOWTvhAyWy2MIf5HpvyvM1+1WvqCFf1sJyHfWrfqIY05bvhjW8STj04JSthD0rP1nDWGdn2h7RE6O

e6VfW50LDk0j4bKjOD1iMb61PVVudK0ytx3eJnFqQp
HzWajLbNV99pcAhFzuUsUt/SiSQNBwkSLRHLl0hIog70oNGYLBjI1S8JWrzf+bhcVkfIKorE93HLFA1P

kdJgWMdssZf1yHgHaSxZDzd4cra+m/JKBJf/JORswdXtaBIBDXoyCZT+KGCGiNggwE1RoHzcsjeMYp/6

7ZgdsSAVAKxvU5oaQQBrqLzRx1o++tUgRvboxiXN9Y
6uDvhsJxbHCz858InvKumuaQ3ddaQnC9shcMWdxfP7qb8V8SZfeM6/KJ8EDxBgVpO9gJTZf97FAlGLBk

qbEakCb7nOtt7OQ0Yly78XGgZVr+eYkQ2SXsFFr05Bw2SpChqqVMS7G+DOJei4NUurXWL3gT7ZpNTv+c

NPqXVLwOJGaW46wCoa2kyhBVZZEc+VHGoyjPk03jan
Uxomem4NxBbqoF4Lj6kKuDuN2AARgK3HIQ3gE1MxkdEOX/stty9wkCIGD8+a7GVXk5eZRktu7ybV3AwX

yNy4v2jzgr2IoQoETcnUf+QMTqa+rkDTV8KCtPqv7CUr9WLHzSiYggG/20TIMCb/Up0DH/I6uVPtbGbA

RCVvv0vGPaQ8mHC3fNE8Nv6tB5OWco2bfZ7W6heDh7
xCxLk3UAjYtLOTPNT4R2eIY/P+XbTxPlph9OTmwr8ntl/fhcuEKsBb7WUOQ4OZzQ1mPqS6VckniXFNP3

zJVEn06pktjMTIBmSYgiDWZ2Or04xuzkVTgqgVpNBA3p4xPGCL+gIRAhmz5cPw8XtsTuA84maefOCLKo

45Vhxd+5ofc4fzWzJHcsJsjoAukXT9wKIzYijnoDZx
iwCZHKFRQY0BC0oyGpTiEMYd+sw8nP0hhZaUzWwYdj2OarRtTV8f/HB+hffm4Grz7HoJ0Jr6BGeCk3Xh

l5D7/d6NCqEar+5UhEhERmClgd0UvpjLv1Bfb9IooEbPtZUAzmHitrH3K1k0KpRJb+a39B+GCHmKoTGJ

805o99oOT4kmnhUPDBbJKo1WIsW8p4Ug1pqnIG8r7F
bGzOMcDY12Sn9f8eLiwYfERQRyEcoymbDTxA2zQ5+jQX4tgy9Ot0gk5eoUjh8X7TtiWy6VsdM1ZHrPzA

0CmP5dTjyuSm1P0x4DiMQhLqz2FBEVgfAvXFpPFMkby38RObGZBmN78930iotDHqqI/DLSV+lFN6uAL/

0kmObmUFc+d4YArCCLOEpdsLhM1zP+QVd7GVgDLQa2
XfkMW0ld/1W7gOpjLiDGaVJ4GbECkcpQ0w02C9nBidZhX0rGh46EtvKx91ZvffylKsQMEwUF3BBlP1vB

C2F/NS2iicymq3iG8T27bTIn2gDn8WC92YBECx5Ms31M2zpkshUaHjX+QB1j3pAqcvtxy4hLT4Xyd0Eu

eWEkHOyJZxjsUDn+tT1UCs4eE1AbBzjuh1D8MCJkyq
1R/NnNrGsY0olxJGTiMb3GJJ9N0d1o9kRFqRoIrFKzvDFlE5aaFc0X10Sz9AOHWqzoRIi/DE+Pn59A8f

FXS8c8rYMdlUsmmq1+sc2wWtN5fvnEVdEwrhIp9+Y0+wvA4bM000/Do67BeWvH3R9/Ss0C4uK3u4lkBw

UzHjN6F6qz6tiKKhcYMFVwSVXUDeKwfIr/+ZIHmM9+
kaPFWfvAJIkPfmWXCD853ajzrq02edcOPHuqmsqa/Y0C2FiB0cTf9M+j02rVo7T9ukxbvMZXzOpaPMGM

rgrPQzGE9uN7rGc2zMgngS5eND8NeMzcjTb4CC/CX2N/QzZ9OLRFDg/viXfzQAwxaW0KUouTwD8rVPr7

Hebs6mQ5CSH5LcuXgjZfy5vAAxxX9CNG3t1Jggef7x
lA8a3Uaoz/3cCe5kt1N914AMIDdKHk++SyKYNfjNtJxzlD5ohO7Esq9/TPzmTAVEXa3t8rfDN+Sv/A/l

kOPIGgEKTgMSS58g8VZJtF/TxV/1bx7G6w67G6i2BS/9mcse8Zq62qPe4yTCD/te+MLYSF33COMtSClI

ZQ/1wBeKyEny6G2PEhbhioM7UmvC3RkV/YAhIDyyvq
fjrn8xAqWZ41YUl9J7wu54gAcUD+GzvjA9R+wT5fEpasGSRPILHRB2DM+ZioKnhstgPARD5CpKG6jnlF

v/vXgf2FmByeh/F1V6aArI5HQG5a9s/fPQZYFmd79c6ofg8A/9db/pjS1LseSUS+ahPoiBqg7/FV/Um4

OahF/hJnfEaa+wgv+pwuQypa7V1TUYWPRzQpS12ulH
2TIueBR8U8p6hi57FmyvW3TCrKKI0xm/psexe2NUCPyXmNM4mL56RbFvqDnGhFgAsLdXJ46lISEm4PCy

nbYTexDEPCjxHpuu0OBVvuNe5/cBHiALUvlPgMvCDl/B5EfFr2FLe/DqLggsFExX362885kGRg9llbYT

cd0z6gqS4TSM7ueanfXvXz/9fbOrofWQmYygUtv5hK
qIIaZboIO088YV4xAomPAXoBAnbIRbjdtYNLadiig7ZGfPn1fmYIJNQ04CLQK8U42z1lOxk5Mb3CLmf4

FkBbDKMdg+ZFzja7Ct54uleW/67xT6vmCnQBQAuNo9INqGNdZ7hr8IVImaGIW1zRpa+XitfPrgj53WsW

uRTs0/sE3RfxpNywGuIg+cgMo/wqNJQOpFGE7uVchj
X22Y9BLZ31bt8hutbx7Jol3Dm/1aaOEco6qnHQ3Of5thJXwN/y/XcmwMN0VWPmJgduAzSWtpt1f3RlI9

OriNn9SZhClHl/WdHJoTmJws0VS2jeSURaG/RWSqlDUTWbtrJAMooSK967kSecpaDnqJ/vih4rAYAI+k

3LYN5PAiqFeTr6na3zCUVZ2nmgts6LBQTD4aZFv63q
PbP5tfsMrPQzUI9etH05sr57yBEhve9m0METPCSCo/flT3UEN/t5Lx4rYCBL8YEyvVb+hfLjuNv6iBah

wK30A2+1OdGtcave942Fnzbyiq/6WBcoQlVkg3wyJVyTx1p3XW0Yewfz4mqXiG/4rdtYTRxwBxZJCfE5

6TFC7UoOJiaLyKzWJgGYIwG/N0L5GLnlQ4DfF4tPKc
lXkZKswmSYMLIWwcjfOCbTH5RlmfDEMFe4VN5GUDAAp7Fgg8TT4tjNLd3WW9RIfY2IpbSuTcu+Hgyiw9

WbWyvTRBKxgbu278bSgms7RnBnJ7f6e2FzQ//AJP1wv7e+mUUgqlEkLdL+OsQ9l9w8NXhcFD5mX+JE8R

LFM9GhYmgZNRao7R3fEScvNv5/AJjj2cNxddweME2n
LqieQX/MwyAf1PGzrVA5RMQ5PtEKpFciTHsHP+3LdF+mills+vZOSaq4SRHGz4u4eOpUU0Z2RfQ7qXqhMOi

3CvIThAjvESxV3Yz2WqFitSJLYF/AyXRPtu1qkssKK2sjbmaYXOGqu3AoC7Ib52BDtT1uFysc90mn0qB

LihY9tR6JEkjWqHlPbJYjSujp++JnUa1ebpFRH4CNP
fNXnJ0YZ4nB+YWdEbzDIl07qmZp8sx3FI734YSD0vg2EXD0rJDSMWQdtDH8x2VXjVRZuA9PisnnDRT/l

773hL0uN22o0XkfhzkZp7EENn649FOyVSZtl034ipVnLtIRo0vRYbY30uKk3FjQTlCiLfmX+cdiD3gXe

a4c467inlDMHlNVppjjftMCnUh+HaF6Oau9ulcbtFz
3b2wdaeK0mkIWItFhYnn+k0N3JKwmx0zIkrwE8fLFxTyx4IU4ujeHluqV0la5x5iPzOKsVARX6edkSkr

9/d5/5efE+R54QwasYD3e+JQybWGRU8CcbUJ5LV96qA1CtNDtkKU/Nbq4AZODDDilR22K2DEXhJmOXNk

ksgAkowyz5kdV2pnO/G666iFaftVsGLfIgslIqriwU
ciXJikDqI68WYQCM/x+fNhNI1sSGEfsqSyRmEwF9b14/3NBmTdMas+upCe39INkpu21RXwlBRNml4e/8

e5Lt2YWlq0CmJMHMKoL7SYD6lOOxjhE/wp7WPCgVpElbv6BRXrdWMA9Gg6HWamv3+aXA6ynjyrlJPSTf

8PceM6qa7ZRtGlfzkWoxRbY4J8MqJAL+0RctDZOYZF
krG8Po3mx4x9U03hHgaaUu2QFx3RFbDjqX4W6afU1aNoHcnxhMHtzgl7HMFE1/x9+FBd8f4rHLMZRz/s

Pf92su+99AT69j4+tv1A7yw3cvPXIdK5al/2bt6O1ckMC6Tm4xtAbOdPxS8b7+bQLvkpP5e/JtMIKg+b

8eR/ACzfR3RwTCjOVXjdNEiyzSDsD/Kx/STRLke3hO
pgGONLkWX9aRxGr1IlkaIC09cEoWAJzUOn5i09tIGxptcNSw1Ka9y0EkGc7JiP0t6ziqX8cjmsDhoLx8

OD0h32+dwYmCmFIUmJOT4ihovvNOa7CpXZVN8LHSxsTIEZKjKNHeSLZ9e263f0vU2NYNaG7r7LM56M+q

6z+oUAsi2x/F+TRpnI2j4H4GL5hVzS+7b2q1/8xkXb
Huj14tf8Mb/ir9BJ7Ba35bbSz5tYiLO+a+IyFE2LndI2uWOHZWuLYr1eB1bk3rlbJZSI11mI/vreDRfc

grf8qcCG0TFNehpeEaborLNktGCnvRWbZ5xvNB2C0fi4sB/JHDImXn+t8+RKzGKYD7qhFU5mnzH1H9KA

UOOVVyl9hQdOvIctItT78PrRX6FQXHeOGdUhuc/AO0
rGZX9l2BvyOeKv7KEkQnHgNdZpFsYc2R+TC/xc+c/ByZe+wd+9qTuxth+bBo73JS/rjdwW8Ifor37Puj

HxHT4T7cA1tiPfsK/1aAQ85TzWFxc5qU64HoRE/o9qcXX8Zp7ao3JPy57VnCe1d5YYqvdUzfG5N9ShrU

XEYleUxhCxDrhzf7NnvQtS+fBQ+1kdrfPVZfp0Pews
7gqv5LjJbn+ICjpJUKhJyc7q5tWbxljXkoKS0It/7WGth3aP9K4JN9ZPXOJOfPjweZhdzZkJfz+4/IWH

f2m8PNo/0jLgmUM24WnkG0AiDfrGFCy9aTwW85N3N8ytM2/+F0ZmjQruE36pSY7Glzl0TYjQsZf70M+z

73F7XrYTWUj9jXm7V/JV3KnaZJd24Lwcp5+fRMaxqe
rIrX0DXJ0Mz+95Cba4Hw2QnafYa+IJyp5/yYN+IQwbbhfpmZLyYmNGb4LOtwtAyZpzc3/u9TapzIpJ00

nSaAA2FmZjlSvGA1lE8UULkYIXAcGpZB6ONOfzTh/poFOcT2Sa+HRJcco/8B7w7eBGVWaN2bg49KuUa8

iDrF+m/9uvCtwmi70BWR3L9taEQK+nbKu9PMJGt0pU
ScsvIAVfrml/4w1Gqf4wxpkBsCjmR5j+34WVw80/qujM56lZnSFaGlmB4qo8LS3hgkR6UCPf/wYAODr9

taU784u/Kz93RDkx/kr7g54nMILaikhyQFgoBSo86l6Cnvv7uYS6cOP/7f+afL2uwMxvHqzzxlVjxXrY

cwmkyay8k9juvsy2ns9SpRoNE3u+opuDJs0+eK1e1J
ZxAcxUxLlPgd2Buevx6Uzzl5+YNeW6TGQigM90z0mMIY2PpeEZuUEtsfY7sNn6DvzHG+TNsTXmr3cFFd

vtgSkLcToFkTpCb4YJzHphqV+/D0oOFIbMFOHCWLM/mZ5RwKXJEMLSzTP1uSIYaElUSOAD1vFCLKtCMU

AwUt7Qr5KRT5dhAJF6I+K5RyNtBuiUuZ0fZf1qxKRE
VFKsb8pmAviC5+xNwqdoeH1r46RUPimP2EEqYOofWgFkjiaUdxarUCNXXRNb94Ir7xRLknMcuHV++

PpZxtv/6gT/DAYzirdUv3jyWchSs6VMurpcV5FLmn5yVs35KE85BE3zYeGguZ74OlH4nWkFfZvMiobRN

+uUU3AsoF0l3keksH1mMpH047jWA49k8WMmmpsvO/2
+evf5/0Rj8S8EXsXtD/gua65RYsQ/9Jp0Hy35WM6QBdnNEeqVd1ypquqouCNbqvQ/U5b6daLqWB7tTxy

CBb7oflTFrTz2jfdtye5g1y+ivE28YSG4n69AwycrOf3HPDJPWxHq7sYXu71S777eNAQ6mezxqP3KzHv

1xtoAm0P/GDA4kOzeNlW2qps73gv/Zl+nka62LiRXn
f6tL/XimEWTnjAMoDoH1kBtapfdIR/OXxKHaEXyxj9qFzjtmiKkddeQuHZV7QqTfc6isWwaRlJOmfUhN

WYSJHWbBWkebfdU1118+wX+U2cK6wJTlobZYsQPpWoMnZRkut4XcGrqC/93n1U+73EDmv1HzFFatolcY

J74W2hoDYBKIsvakqBWNglWxF/Vjsh1ULvpJkaBDOV
3JFYlN1h6Q2mFLQNrLtWSCO90Kciggxpzo2qaRxq/MMOl09J4a7rZXmZ2/8xERi5ZVu/c+3L1eMxA4ot

Hva10tKK22QkTHV44JegPwk81fvFBNeN5cHbeL8oMAYmNXqOABs3MDZsaTxJsYozbr1lGrBtSjcrvIxx

iV6VGjf+xBtJuzVEqC+igi9gihL/gYhoU5hebE84uj
6PFJN80iacG2o1ojQLnZX8BmGUgyp6lrCQLRoUPbmUE4vL+/tqDGq5ts20SbXVrJsrd1MGfKwyLlHm6S

PFl/0WO587lT31H0MLTVzicQfxOizJb5fsQOsTHQl5FLHGP32NURwnOw7GxMMevH18Yn58XfrlCXBw8q

up48hw2Zz3kCjbyKmhCrkm+TaRgYYlEwl0YG4mXppo
u4wKH8g8chp5HrLbyTpAVg/XCZIbCTuV82m7of/o61CIOcY3W5tEHzO8m85EkVTCwBW6pVcW1KF7Y+tG

IHMcgoDfHgUF7TDrhbvQB4VHesz9AY6U1rS1Na13vsK/bBTOQ7LKFZUtLYkZA7Mi/+OAKFkYwQKt0qEQ

KjFCM7yNox54Ghb3mJN80njor/oMGc/63fJeiUKnf1
84sQyZ4bWto/SY0UEW9xlnrKRGbu+G7QiZiIHoPnYs7d3tSZq5HaeNtPEa65P9Q1W4Yj5Hmu9bcBKJ12

N+077y3EMOT+F07mwPoJdbm5hJh4Qg8crkRlKkVg1itU6VmTp1/2w7hy+i2NPLNuPjUE4GmnEl/51Rfb

KNTegeq2iK+rqS0WJKAKJfQum9JlieQrm/B8Q45APQ
Dgqd5guqP8cbvDjSnsnYQ0j56rIGuu0+zQGIwZ5dopu7uKj8Ve/SZN6xo5A3ok+d22JrXb6higubfySn

WAq+eq1fgPpKQGcCl+Y+I2JoiXUuYFhT0TH+lxIJ81aa/YMyFBNPsBarCvTawD5oz63xhatEGaDnIzYX

V1qeZIkJxAvEOSIb6018hY/a27HQgApSmXHuq6pAsK
2w2XHWlku3ECv+VdvENyP4PE3lJosZVFTLxDGQ1gpfNZ7h/TO+RaWJSD88HuXDQAHAcFjn3laWbx+ox2

XO+HSvwNy9KC4ynv+9JLFR56giRfs0+c4jjSbDRoirFCJ+JauZ4O9E6dOR4hl8l6ImD5onnn9Q4VMtRj

ZlbkxO+fk0YOEud3tfWz9QrqmV4nh6YpPk850kM5cI
9uK7Kt5eCS2k+PN4Vkpp79TnUn5To3fCqcz8rZ2DixAdyijjne9eQqxscB+vf1kD+kQnSfG4ak7heRgg

0gs/uMED5+zOv3f8X7rncfppI60qGRudUMc2gPPZVG1At0zQ0TDHRkxMuZTqxyDl/QKIxdx9uc/NVzzl

xEATIjM8b7ORQIifS0CaU9PpxDLgRxsbk16lj5pB66
oVemaS3N04QsgoCsQhnuqnKiPkeUx4ce4j7HrFNfFgcJTg7HQhfsgnZK8mVeoRxmSZRIhHCcg0SC6WWu

bE/mKhY0xt9ZKKV6o2G+JZzcRI+BtUoVoOtDxojSugawaKNPfQQ7bEaqjDgsvlEdXF7Iy73CJx22kbIf

Iutv+YIuHZAjtb2CHiv8o66YUcV/1Dx6Sc0wxtLWlR
O+SqDuOeWDx26guyuAMuhy8anGrhvYooFLGHtEOYi3amVUeaUyd3V1h2uMMp9OztEBjpFle9JX8wvv1i

hLZT2VLpm4vsOdZtS4f0GDjiMvvw+HoXrvOhk/XIdP7s8TRnUyq9VlnWpLE6972wROGouRr8bz2pIM+N

75+ZS/7TZL4ubjf/2QTdVeAl/fkhVoEDdKXKOVtLAp
7HvrcdESF98Yb4szEzZtN3m871Qs4UaX38rQDt4S1t2MqG3fuiyTY/XYhFFb4rdha5vEIk06qRDugN4s

8KAcsgFhgaT0OpH+gtLOgWxZ0r/VeCMp7xjMK8V4mDBkLQEIKf/TQgzG7K/XrEPUU69hTi0GLbwXlErl

sHLB+/AdvTKW91l5s8MfL7hz3rznJusSGnAKsZuYg2
v4rbF+LVuoVuxfTRZLj1rs68hUXAvWz1dHmOU82Tht/iQ22yZmvqUG2Rvmuh10iFqbMwKq8iI78g3Pd5

JHlPQ7kqzTkq5NoUUP2DbuAlz4y8TGpKDIvQeMaCP1A3gEV/nElfSu6J8EWjZNR18Io/jrpDG2GAf3Cu

sXPI9Do65JJVQDXfOOcoANETRTt0BUV6YQ2ee+3v22
iIhAdbMaRCh6FnT1zqgp3W2fi2o2r/bSxg1EAyU1lNWyjHPanD/4d/IlYPqUWsM1ihzwq3uvunpxbCzq

7HQxb80rbzEWzVFxnQlgceVIHvAxFivHSabMLkTVFE+6ixTkgHi/DgSIsPYys7eqgE4JWWRVOpB+7K5r

Di5BOHa+Qww/07kv6snPigtg57gJUOYsGvR2N1NX8k
L3j6+hxIHO2veI91ZE1/VZkMvGxzg93zM63uvjUkzcfMSlCD5GZp3deGcJWNhM05J6kvAINlAglYbkld

hKQBMi93zMTtuTCUJVWEdPRg2hOkmUZBW8bzkPeBUWcSgvvXZsatpWcGmdZ4aJ/yFCBsqbIg0q2xIjqg

1yNQ0AcKudjOWrQJywn/v+EJPUliwFlfgQ2383mRyW
KFtBXp+Aln/+rsVXS7NwWpjORfo5A4YalPbwuJoE7//JJ9aQdP1EI0XdMVvbEW6lJIzbHqxvvdYc7U6U

3gyFl4MHZMDcWUV8PQEtRVaNYTJ/MdCMkWKBP5vghBfKg/Bjb7+yvrRiuykHZccDzoZ+omBlMJR6/ydr

ZATvsrcShmiF6SLRNtPB2GFuPlswCKx+hT8WYDrMCt
g7i8NqJAa85XcO3VyrHAzYfObTL/2sGB8/NIMZvqK12q+z2yiFIDNlMm0cvsz3ObeQj0lCV+vKYZv8/r

7TJtfR2g+sPytUk6zF+5MExEflkseHXDkap4wnltlX29U4PF78tLAkdqgbKUlaDCF4KeGap7b0LjZtCP

fKVIuSM7ONVuy+6RqQ3cNJjjLZyXA75ukJFx+tlyJW
HsNeL/e2WVH8tRsoGb2z0vqevUEfeXHpYh0GR9BkKBI1rMczegYHJ+wGYNIyU+Fk4lbcuNc4H2LyVFSd

x4zy2HVwEwznMDa+TmOPCfIpEmpzQD0qQ6W7BN4JDJFzwm8q9h7dcOMvgx39JRLweH9/mH7/sGIjtHXO

/5ML6RaSBB4Xersp3HcOtsE35lbgQ9P2NRcS4sc5Zj
zixPprO0/e/m0H7BSHPMogRMdpqsrIr8a+PWMZ/RM1O+84UYJhfLFaPHSC7sEjp30el0E3MTFyspirqE

mUldCuQXz0ydsgny2tJXbTna81mjvPfwS3E1tJYA+TWM/h6VXBwXJDH3X6+gf3oE9ltpDgSnn1OnXAWk

joUu6bTFKxJW3FLkBAE9E1P5ZqrBP4+6UWiebOUCzg
dVcAmUhHpmbXFwdMtJY9u2NPt8BbGsM2Y1XW3OOchueEcTQUb6aIkKzwoF6lmJPbf0v4dx3tZQL1ZUY9

kHeGStD3f27bn/brEpfgUsiy+AXu2mFjbNMmodOqkoch5sz54h8Y6VV6AcOcfvv+xtvWBhzPCLFnt9H/

fzce6urbdfNzaD4ISKFQs+obXEOjFdSDQ+63ahCQ1K
SfrSOorJrfUWx6Umj76y03CtXucnNEZQU/SNex3sB28A+OffPSQpbK2NETxrmwx3dyxD/c1S5w7PISr4

za335KMYZA+odra5wqwGqzjTs8Ex2jcs/lESTIp3KqQ9TJUzGyksJEZ2vQC9aGOVl9oXcxa2qfRZa5vG

Fo6kM1cMwCawUwrlNTA3m/xKvhpgEmoz2TxWxJyy81
KviCu9DwBaGoQIfj1yi1gjei+WTK09Y8BqHHtWF0C8INmis4jibc4MWlDy1wC/7m3sswe+wTmbqNIXw8

rNoWmjaOLlG1aHT3ZzW1krekt90BV8/4M1xvtVaLh367mcuWWH2wqkMNuMafUuIp1NVOqnfV1bcSOvWn

mRS20V987oUBO6uDnXhNz/8Xa5dlw+fEyzmffwXuOc
uUsx5WiBOTNz+b270QInok02otHgq2VPx1kR2diL2e+I7GzULHOFiJJXP7hA3/tLmwpf/rfNU24L6BvJ

o4ynhfmM5TGKE47SkxC1+92zDVM+yYf8HaZ7TgB3W49iSDXL1oFGJEcC8IOD6oln1ZqAXbbu/0H7WbUs

XxRePhrf1cmN5+ifK6pq+ZhcJFM6yg7qO405RYg4ct
8W0PMOU392tSTeSW39dcn4sTXbEi673HaxcUK4uvQ8HeDgBwlvHehGwh7OBY1MaINGfj78g+nTU4sb1X

smyIwHTJV6PMWMj4LkO5ELxmujlvAGAcw0IJVCYS04CJ7qPnP6ES5Efwt02/kYXB4Pxoq27M4412weuc

v2RPOip66XzPid8i+5Yr8QKhewD03Jiz7+q2OoaNGo
mO9wNOSXf9tyuf/45llNXiUJd0jsHNxZxv2MZY53fTC7Ks9RSVsQKj9Wt89zt3SPuT3sRDfvXBTcHKjB

M6Yo2M99Z+sNLXN+CYU9Td7TYVJ3pH5xcQDqK1jVl14MahSrAtdEivm+KeWn66Gr/TmUSkfuYXRIAdq2

d8ybarF21LB8JV5vAGW82etswqXB2VSuXgi7hjcYsT
bXf8s/ZvOxz+COY90m/O5fOBxBUtLkEdvvJHkq6p+8VZ6oIG5YG3eubTekKi5q76rUtwLblLpx7SCAK9

t3fpgY59mLTaySezQko8fJjP8+FPKBkB3ZnUPOl/dssNKbX1pFe/jE5QISc33/EDWARD/11K2Cd3FFySWzCC

af5nVM8xMjuT34MrSx5OrYhSNz1MA5X06WDU7vzE40
9m7sUlc/9i2klKhiXUgHgBjkvMuXY4xvzwtbOX7exANCcEGZ0NHAy5SV5Ism3ZlKm6Yu8b8B23Xd70GV

w+Ah0aTgMWxexT+IObOfrIKKaZfUdvr6Y66XIISD73pV3HV6d5PW03qHVvQHRgNISSK7pPxQYym9BW9I

R9uvogOeEZagXbqjG+BY9vs2gwacl0Vd0AW95pcVYV
wX2rSTEp79fWkIJDzU4UMaGE5/jSwdRU4OPUoonmdzJ0dtk9ugecEt7DFW4bYDjtHinTqgPJAZuWQzrY

p6/yXnjmQivf8iNdBDMMsx2mbieSIdrm5rJIgIWFZ5HfcSA0YFRm07v91FKgYi5aAGcv8KI7F+7b0F2b

uMKI3mn08uo2bCEIqBp2mWyN16sP4c+TLggRq3qX24
9iRm2KJpsYu1jMJTKMCqHj8udwUh0BjnR+mC1ubEEeE/T2uHEpvbu7T1pUarziZ40NpbLwH6rOS0amnT

3OGaGTUAnx/xaJonYK5g3FX+CIQ+FZQjkv0d5CC1sxXQWvxfD5yVMC9Qq3VhXLTczHDEgE6wkHiv7L8h

s+Cx9RnR+zMIeQr2pKkL7PrlXFXn7PXgNQ8uzImpGa
ROlP32zPorXCNy+fHHH+yelkvMJGmbJ0OnORWbEehZoLfcY5QkFjPs/0ySGoeBMx7RcQvEHAs9lkLGdv

vbKq7rEFfbmZ1GsRSqJvZj05F7DJyufLvOZqRzF3TxmiR5+lSISE/DAMjQ+TeSjP+Fi8ZrvjlBnHO0kC

mPcYKv2rOERw7BV7zjlvkYmY5ykfdxuFgiW+zuXeYt
PBHikUN74LBBlesYPFpvrmt4+xMWR+z3N64yhvu+gFMaE61zC9vbAF0ZP3ySWf6Qr6KJPYYF0s/jwf77

RrCn3B5HTOut+7zHJUkPwlF3J0BSui41dtsHDG8K2wbxoYmy67qcOxOHR1u0M9LFyHbIh1aCcMrvsugD

zOEccCY6aPpRTcSaYjmpSjcDcCxCFmmIAprXGlB+3w
kU+vpQ1905+7ez24cGX22oU/e3FrlD5gcmFSsyAKLPyJ0Ukle5cK0yDrm5p1SzG8G7eoGGDCgi30yJ9D

S0io1hN15f65SFlRV1w/2iHhsgczk4NrG028HRv9QFpMLpJkLT05B9OlZutQ0W0P+xtgiULQz2/JrmIF

Uncb6nvX2Ebl8AoG0r5S1ec7Ssjwcu1Z24vq26g+3l
jfEAO7cVlM2Ms71DEtjaeiLLNwaGmzA2qmZgCjf6yqTMqTGwtm05b80+M2Q90rH34ltjA1kgIFE/70MU

4rb2k1evLea7nbw9ab8cwfa098TGTwGZHpHZPKJRcwIHiUw416xk90YPRaq8dDlNAcPBr5FVIm/ia+09

RD1YFOLM+MfqaSDXKzfrPlnBKtiw2GrORFRmm/ZJzb
toFm2jUvVSNOrgGawIU4iJ/lcfmKbSWR2YAHCM6Pe+zeb5QGTssiwXagGuw7Acajwq3Wx4E6hj/Y/0eH

oN8HGli1toONBNQnx2/hLKYeNMW8AKu5f63KK71yn86OqwDyPzidy+kG//BGMqjCba6YwQ5S7ALSA+ay

UP3Dun8PVGqD5JgpsJf38KAgZm5DeQmhGhXOvl5i8X
z3UCVITYtncZDcwWo/2wa4Ck/OapdwuwL2Bca7MDKmHL7eN/e2PBnKzO9fU6eRHSVIQLfnX1lY61tB8Z

6jaM/O78oo28CAzjXjdyoA9CrdqzYT1M2AC2kxpjSZdzKeRpEVer2mz6i+gUWWinm6XsllKtRXwTOsxE

LnaqDzs7KwpOdoUL9g5xo47hP/yqZek38ToM8edU4H
10/noyCvemaT563teYE7SQcRMo3yT7r58wAkH3/TBe9FAhwr5aFj8AIDW6LNMbWjaRF1oJ8JohGfSw8C

AS2e87NpK4BxJOmKfjmrJw7aojM3pF9JlMtf2tbbgUwg17birwtAFmqRoS3EGfu+XisDP5fLtt0MsLf4

8NnXxX/xIvSwPePZQCD3WIv1iecmD3wE6XKiW4D87F
vj0oRzAvkdJGdCEz9yVH6+HcSjbDA+gw0iZnqp/9MbeJrb6XBNg+iz+eOcNnjPN+lJxIqeQFxTX44KeW

B7cUYdiZg5iwQKDFl3kioHHLdJvBmKPLl0kouXF8McwfcX4c8lovAaVU4WliMJq8ddm3kKG1UoNpv0xM

tHIyxRLmKLLvt/hExdk1OmnZmYfx1Ccrh6JVdVNnjC
0JKxpDPIpHAJu5Df1D7DYUjV49kyXPJEjhrVhcUsoJdhU5ddNkKeHY/A/wayT9v9dJ0/qToEdlaGBwZX

BspZzF2XwG/08ULaBfIv2hZXE6FB+gC32eIPGOljoq+t5+dqYT6bSgGec6mWIX+Px7RCmd/foGPDYBph

7MrgxyURTC5aAAfwtQxw3OW6IBhurQ7FcvAXdMtfHJ
+UeXw5C0Hy5+Wuo9jcS5788pSfhGfWBpFMq+ALn7vEj9hJYNvzDbatQdzX1ugp1aO+ceOLY22e4xlhJN

bd3zRjDMgriDOYEP0zNpIKGUIeX0yN7hGuQYmfnaINz1lNvQR9RI/Jr+ymscqGuxbmYDEAYFzhu5C+jg

2zC/2qT6r5d5n3SCEWiETzUSAbTIPHZwHM/dccdXGa
kYPhKieTtf2CNLYiN18xL4VHfDxQ7wPPQ/u8AJOWxrmI9dggaiL6zzXIpYEhMKU3TklSxlen4Pi9okjv

PhRSog6rM5GBzUValWYKTouaCj+pM30worQuSCZFVn3lNafFPRYXtRV87QSP0PTJv5602rvolm1FtXFY

hGS8PjvAedHV2RuuYm0Eyl58p16SM8kKonD9bEoVvV
6LYEksOJwZEqZlCOzQbPpa5D/5QhN0871GxDOwG6UmMjv6WK5Hb2RuT4HJlpB8d5584N6HVkAs8XYqbO

IGtTO2mbG/3U6EK62/JmTDbCwr9oon4AUte/JGQ1MDn5MsaSyusroxWUa53zKS3icBSqqqz5/Tf423CU

J8ZRS8j6p0KM1TPz9Ri7tQpQgGvt1otxvlBmy8OKlW
94dxGNrXgOXmme4ZLlH4IyUoZ6W0Ko6o+h60sLhIh4pY52+yoVMrCFHMYJP07enLEsRIZrAxN0pQvQK2

NZVjUo1Sf3V3Hjl3rLjnHaXg3C/jgzToQvEc5Nxwqpqa0qBgNt5Jaax8lYOb96x/QL7ewQ5UVg/aWpWx

Qfk+0V0kDqzRn60xddxneb9G29SKCqnMJce1CpQ+Ca
mQJtxh1r3oqG+8vlfyaITEWUfXU0o+flDQGv9kyGh2Ryjp5tcHOg/NNOjwaRuoCGjjwzgmYCkrTU6fB4

yFk8ReqSUnDKkBHZ6Jw/fnv6nq9g5BD+I7G7kFiQ0xxLjU5jjBsrqWNpcaAKhMqWruypWX2/p3X+U7ES

KovAbHPn9o6htaqex6szrunrsJUlsilD/JllKzlurb
UFM8g8lZW1NsENl21N0ixZucHgEQJhntYs3kps+tgr7/p8Dd7NDqJmSnQY/8B7ze6FdHFYtzaMxnVEPZ

2UOdYjeP0zrRo4o+Oe0iMsg1hTlVdSFO6RBRHeVuC/qTOd4iNA9355/4YDWeX70+pI06IOualB6GoN5n

cGKuHQxWCfGqW3XyDt54HOqWZ23MIQ1NC5G8UCeb9C
rDxfcn2fkDPkghx58ZrgwsUYKyU6/UC2527RwUfqtEmequz4+73MP+v35dvNdlioM/bdIvgcq/4tPX+J

Z55F9RnJF+1gaNeNARm48xicG4O+P9oRTJoj0Bsd9/34vU1VtHM4WHqLinWjyCjAdDvwYQlXxNTMkM5o

Ry+37lv8k4487S2PR+QwvgaNow4pBBGUs/sJkS+LMJ
W6SzJ+Zw9y3O7g5d1oxZEgT9D3u52b52qZJ/FBX2cXysrcc8LmGbvrDugBckfc6/wk1zYFz4x1zXcSYU

fYhp6hRFPvFbUaTXelKrRANa03WMqRC0EUyE3pFDkpWeTklershPZFmT9N4KlsA229wtjqzaXiMoPaMf

xn8zurhaQK5bCCS1qVMG4WT9axjBHIqNLL6gBnJ4mM
MPGE1jsl11rq9wawDrp7zTI/jQ1F6VW45oHSO4els9sLUH5IH469teJj01x9KPlMseU5x3hXik/kiQf+

iw4P40q+EQz+/8pZ54p3mZDKUhkAfVa7qMO9DhhqFHxiJx2l8qQQo4cDxGuriLKhB5C5Vg7gViAu2f8f

CU0HKw+XxjQ7YOa/L55PiodbiKx8TunGS2FWCs6LPI
XYBp/TtSY/JiNnb68bCaUjfoRJegMczbsd32EfQg6OlhYmzqXdaMtvJgsNRGo2KQv++DGGSC5d/jNXb2

ZNySCgVPzAWY6uK24BONj9gzETHAsC3fPxAJZ+FX54jmarNsbUepPHh/0vxySkDyXcv5t0aLFoWj4dbw

1xm3QFdQbipvYErhYkMyk+G8BAk/79+0rfsx58TSZI
pxqskLu6e+/pttaH8xZ7MRxu2skbAYEUnikMZJSQ1uMIacbeYRrjIBIzFGydHwGmz3UhVmh3Enec8jG9

23Rjoo3P4ihg7VQDUxbI9TpyyIBXmGFvK6utvmFDs7T2N+tRNm1fKOi6a79oWdCmQHd/y+WJXsQUoSr4

Moore+yJAa84lYZRDvCghvOSy5W24/AA/y1HBA7+yL8gl
I3A2p6aAef29gkc8DodYI49ZW//DRHiLvLYsRpMAX7jBR3Y83nNsFVz/7n1QERd50bXUB45CPeB3lOOq

Qkvbp2kYhSz+A3cGNxgXRyB39s2Bm3U4HiK0KgFCS/j3kzehpcvRBCm8SCH9q2h3HnwN7kogxb6Wc9u1

i9vk1gKfWU1H3Jesrat3nEIGgn7dHRB+P0EYa4Empg
VzUEazjA5+7HWpAB9Cbp+NK/6KUWiOKvZryXA3oeWtr6wsg/01gZiOQOyhfF5VlY1KchPM3KAoQ8U9KA

KnNwB5t8FJMLNTlStf/MCfZmFO/iu/990vFLLLqcukc2pS5nsal6lP7Op35lNng0DdoLcdfsAOOEK31h

29F0tsc7kuvCBI+hq5UQSeS5mgZwccZWglWNtQd9JF
VKqjBz2bZKgptnPvNEm7iR/0rJPYQyHGQpuUkVPSTFtyv69dO00FaQU8FKGDy2ZHa4//LHnywG9430U9

emhAQsqK5+Favbtr5Am22dnPkSKGxCX9+AZiTHk72Q0SopRusyFwoCGvpHfRXL9Dj/O0uKqpTwh8Yl8M

zTFKGv6vbRivTxlAvyfBwRyPK1n7P8HcEYWjct2i/+
H2yxADf9koYws8Tsjxg8RUo7GNqlOOB9iov+Oxx1xbWk06Ep5WXHOAH0x/5+XGcBll5uq+2ECf66NQ+I

CPMfPhWeMPiegMljKs1SxoHPwyNr6oEOYVGjpQDTghDvhWVaySrfOQ+q+1G34YqmL2LmBN5EZANjN0d0

lHevn/w2y0PDZ6SEU3zMMwCPVc5cheJVcFJDjgqOVC
WvWQXo3AUToRkYCjVewQqjgeVxknYrFFuRrDQHF9N9LYwfiSAjXh8/GTmW2GzvpIv7x3Ux0uVHQ+CK7e

hxbCoRog9xWbs1iMmAG7PHOP20PN3TkOLbX95hOS+Ocb+sH6b1w92s15gubgXyTc1ZGunhz3nB4axqCz

EZ63OgvQ5RcPlvdC56odabSbDFqEcj7AgrCyMiAubR
X8vme3qvSYP/2k0U6qf/hsUBuwzV5kHnG1yp+1B/v7o8ZqxnFemN/iSMcLZl66ansu0OL51CIjzl/F+X

uz6J3T6QgmXm8fBNf03Lml/cCtsWkcf7zSms9SZA0n13FGZr+xW+mvFgh5OWbeaEpxGF+UqK6eRpaZEV

iKG6REfygptiMiiZWmaPDfuvAJCndV0IJM2c2DqObF
Jp1FzbFQAMI/kQZW2cB4H1P8cAonXPUDnLJwm2cgnKTEwC2Bs3AGJ5yzO3iUl+pAodLW3SJRmkMlpzyP

uHCI8NodU2VELsWUCjUQp4WBC3O7dKw4D46GJanq530YPhp8Xwr5UNBWASq9gWJ9r67Ia/HzldS45X7A

FQoU9l/79/UkKiX2PJTVJUN0JUQ3a9DTLdjx/YJHLh
Hdc3JpDwL7+2LPMu3+7ivdAtEgksle382KwrzCEK0REVqtcs32vUe4twMSZ2oTXvGRziMjv8Cukw7etu

AdGebHkYHrZ6avQMkAZoD+G6NCIhhfsiNCD1xnw8O0gQBAe3IWqu+ccvf9k+fyv4apNh443+ekWUffXb

RLmck5f6K4ZHwyE4wJ1YEeMRK0VsQjug86d7vza1ju
+x9f98T8HbJ0pKQOY4i+7SdOpp8I6PAlxWzhcdb5hLGVGqhlDQQw3cPKep8lE3SnTEpF/M+KYOoBvSFC

+h3Lf1KsZe3VLQZfXV8pHRuTxcj2yzCce85mZ4v43/vw2n0PK294Q0wy3JPOjHZfbJhVpqTRRbYCoEyt

09dIKboV8QCN5ss8HJyFgMCl79aj5K/tRqpz530EcQ
YvEXfBFJcEFsGknT3/MhpFUWxahU5qp75UQd3XpBjQ1oTZnpeFCNjjUeR9nXuidN2/IViYz8rtkF2FFt

8AHi7i/y3XOSxOmLtr7YD3r08+vAnMim02nDq/XTExcdMxImM69M+1YR+gj/Y2Z8hS3AL46lSYYx+QCV

Wbqe9AJZBDFZgXVBY0jKSA5vsVhB0qurjqgLrs7qtM
6IGskS1PF2dVA0yc6mwKMpdhflhqsi56d0/DARRzdXHS5pjbiZyHTAFu9XNSbtZIDQ2N5JgG3cjVeHQZ

E5Ith2pGAyDVULYzVvCc9h9ejUltuU+86LdQxE7IoA6SusLaQuQsDhV41VX/r6ZWsvg4hQsnakFkdgX4

8n+ux/n/JKAZxcMvRWjWukmaD5O75CGLCb5g7zjlRJ
nSoE6gGEIG1qu6Qio+SJ82YRBwa5ti/wqn0Vec4XiRh3XHh/4n/if+vafx14ZFEi2BY5A/nio7ZmIzrV

PMseg4PtvoCmLGQvaQwlHozU4hCKIowWjIR85wyx1LPiho2njWqMjIX/Y9fac2ns12iYTJNkg/It6091

NicdNR2X4pgBrb71nIuh34sVG17uXMwbPR3ej0W/Vg
L4u0gpeqn46ieY/UYt0IpaEbZ8B28gntPyM9sCZphqvNmEB5JHPILxtx6ZTdLcRHbWaj19ahFq6gUW7F

6u3SEVgy9LRfJahNxfc6votzj82WMJr4LXjKaGCfl0U+vCdTumvMObK6/kJJt4I692T03Vu3nrTnOaQ/

zUtd+5s0gdE/SvFB/BTpG29k2WAzfzQHXNxZ/DHJ1o
69gWi06zdPFoFzkTDffTKP2Ey7rnGItztQfWlGE+ApGclJ8/cvY3z3zXuXak2ZuI+LQ+cQ/Xha5wghcu

KAEIW6bQTMuvEN4lldE0hM1wPpeyOhVjzHiFIQjuIdIvqneY7PxdjHWkaEoMfsuIGMfDHkNb7EeX5+lS

o4DxBNOiS4ADkIMfpF5MhDw4Ha+0iLlmPeNPALpRcA
TVksXgrT8ucWoYHRcWXp/Tk7UkFRT+PILuNsu+QMvfvd6fmPKe7TSmoJsiq1vMZWTgW4F6TgQtBhaIi3

h78aMTPQDJ1PG6eZtcUPk6GTLEK8EBAI7g5s9ouD7raQ5kHyn8PxAYGPFvCAxQJV4WXGN6OxKwAueG94

QGC49itr4quPHUN1X7hM08AJbJW7C+V2l5brGDe6+T
uXncAJ73b7GoNYQ4xYe6s08aP21cjfMOW5kzvc2x6hG7tEhAPdbSsXt7LUQfbfJMlf9Qw9vaF166/V2v

uRbalr4Boc3hM/QCXajAK+y4cF/V/umSbNaB7AAiqZYcqCpDsQUDZ23wSvySPb5zfGOKhO896S8dAyNV

6MBeaBtNqglrkkIV2KKTrc9SC+80c8EhbQ+aquC0ow
pn3F4sNYtntJwbzERkc01kX5QMKV1i1aMZOBQZSdbpBZ+LvkRqYbDieAAyOCZl97XGtEGWh7jnbX2TND

Pg5DnTws4rfpTpreVYpUx7g9z7IHzzOHJcUTfSZfcfpZLn4HwLSugbMVdI0XWdfwVstWP+ecfRJWkzHr

ldB/Em5RPp0xe7Vy+huEH5uP7/o3POPTfmZPgZPst0
F51jgWvWcOmE9Q/SVhISAfLqHbuQVsQqEzdsKbp76tPwY40q6oev4kGOqfRtzgfIeznEMidAhD0xWt2/

pUpzCg7bkQ7DNn8YxBzFJi6exZW4zpvaN75mD8pA1mKDJdHQ+3ZewkkpnTU+2fWp5I0sA8Tij0BhHy5D

o+IWJyo6lAVtw97nWnFxXAuY24ZQU9LM7MypHR+Urw
LgH+8AZpjdsC8n44Olxjp0htZe2QGnlqGueT0gOVEcBG5ijjpgsijodbFMzxLa30hU8J47SUDXazlO8u

YNDNs11Bd4d0QWHg3hoBO0xUles5arcw6Bxopqan00lj21bRG21Y4AKUPEMyQNNinUcFFJ5YxMvzyZ1M

P2JlYkOsfIgpiJb13+hIrk7+6nl2NK0qinlQskDnWm
ceNcPkjEmuhGRWpkR7YRg9Gw6P5uub2nvm5hrfibhMKSf9bCeznxvY5dsb1NBlPppy5wxRAsXR9xR4PP

7y1eWZCNe2Pvx0hH5ILm+kh1tNkWlojC3Fhz6/9ORP8rqvhabHG5r0GW+XjSciJcv2u3YuU39091eJBQ

3gok9kkjpETSEUEZj5/rqjn2k1SVxo9OelevXQXrIg
3MS+EXlfs3ukOgo4nr9dd1k3y/uQjYv7YRTxprUdB/HvUIIUYdsA/HKZgK+5j4/avRKCSh0AlWaWvAKt

orwFwSUDfSkeNMbeAAmfR8V/rWshX7lEG05S0hm1FvD61YglUp1S2twbaskIDXWUZtGA3k0Pic22kK3b

wWkAnFKTJvJEWiK9Ttj+St8e0BqD3qf3VnDG0PZDK2
73P8xjktXjFoi6NHb/ncUcjFIqMmdBYtwDeVdKG5FxKVtBi8MonhVTWq/5u0b3ldrHEa5PFvVqBVAiQu

PkZvBb0pt6vs8d/RZo50XEXWrh2pvNknq0b8N8F3wsTJkRutwL3beWJRdA5+cv6yiUWhfc3LsXXorGge

+mwHmLizRn+h/chkWwSQazLBXtAAqo7AgHa2oZCcCD
8zm3iTdqp3A7ojmoj0iy3USLJo5M6QMzAASCF13Q+lwtQ1uq6wI8+A84W5mCqS3CrZqYsanq3kq5J9fL

lR19KmfwXff6HpKgwhEgiFcwOvkwf9F1t2IxugLoX2FhriHyfILx8ss7BVcR4yaXRVpd0xoWkpkO33D5

tz44deEv8M/PM51Xy4Clgsihwm+RxXvObxc3zP41Mj
RQSygqi2c1Dm1UKVzSUsNkOdoIiqzQpGwJqZWf/bU98uOI+tKg2OHWQTssVZJ1YbBl4GT6EB00v8oD+x

Sw/iuPoxFsEUTnEF/vWQjfL5NuUGv4n+rPmeyA41adOz0aDT0PA9M2+YKYPh/pVSbJc0+27Yb3hGzf+E

peIrBaCxyDcUjqRgYyGiz+cWvjEsy1rFPSFUbdKgVn
HL30LBEjrO0VpY05UUuKVgZB1VUamwl5HEgZ8zTiKjRmtPcgUORu8qmTXMZSNQ2/W+JUYKT3J1D1fUH0

FrR4XhdfPdAjNMa3uoCSEpurMlkrxdQBkvvHWa9BgujEBJ15iPykDnGH6FTGtoMfi3Tdopppovb5oaPx

mps/rwn+/ITfxgRP2zawBImNPeCEexsiyuWWi0/Xkp
gRBwk0uMDVxDo43BnvaT5IXyZ/RcYsXmaX6gJrUdFA78yCzlC57jIp7xSFZqjLiHZeVsHGq5WRESO5yp

DyKUOY1ECjs9iarOQEjZLQyrnrGrgn4z21M+XZS8j1Yzw84fcKoi0CAzsVHVe3oY0DkyuYSKir2feAKW

9g/5Dx82Bkpbg06yLnqlJyy29Vu+QrBC3kgHqNc50x
x6eHzp1B91fWGsJmVs/OJMHWNqvI6ABCIKwGgUVFAwNkdS5uaK4+fg9KjKTpEdEjqjp0b85/8aQan3St

aMbMYeX6V4b5gIZ8vY2OGO91rwImKR3eOsEu6rZ/Wrs14EZxGUlGRoip9cViTPkfVoakUqQ83yNvg6s4

58bPTwcb8XZxaEKEWtlSkn7KEJCfBnsQhtprs2Bq6K
o/QZuWuDiNVPAnC8IS2d2eU92HkbGzH0eBNl3BNH5XPA3TL1KxVsgRLY6gcyqdr2Jhur2B3wyAABqqa9

10jVe9UTnbWntDdqFc/P82Dw6p67Xa6r3hiex2L06NaeGQpRIz/0P47/gar7DVvCrC/4ELyGy0SK6DVQ

RYv46jTezrOueUjcp8WiEl2SFOtCz1gQe7+ikC9XKH
EwXPY6UklJgVbJSnfR6d48tXTnw1l9Oz6+H42QWGU2HG8Baxe3uK8jwEtRApdqa0Rn1k5JbC59+1iDWV

6w/OHX0pHnIvbJ8KNAAbqY9Ii38wyV4B4qqNabR0a5GnDSHTeLRdu109w+70hV9pojpPpfi3hTibdxBV

L6QNVZ8NHEHOvNQ9wakYQ3JevuuXhciZygigs6aE/v
ZsNRF3rdaW5K9ZU1RyFZ2NYqSCdG/euR2pwg7ItxzXuXfFQgX9IUZeYBjsqMB/6tEqLkfuT6/8Rfyo3F

Xnjufhs/ymW+b91V7DBNFJPZ5sFRz7n1joxVxrrmfq79I85ojAcJTD5US1Yt/ncegUwnBNACqE8RL/S8

ZXl48+r4tH/SAiT8z7QcZ92K8OoqSTwZGd06DzjOFe
IYraYLXiPJHw5wCc5jEW/r9u7YjniDhclo+ysvl12IlBp76AfQ7I/l03a6E1TeDI3gJII9b575HNn8/s

5GOs3QNYbjkymb/nD06ADTU83TvaoJIJN9Px2qmEZLOS/4HvD+EyJnvVPV5kSdPrE6TLrg1HXAyfeqZX

mx7eudYfatW9kW461k8pqBDDcie8YIL3jaMWQeW/NP
zb4jWlJ2v5D1SIE5EtRvVZMPJ3ru1aBygUmx7ltAUhHM/e9iiIyy76Sd2NATbZFvFto0JnkU3nWDiLcK

qFu6rjHmYxOhbj4zq7dmOLwhiMVaxbMsQ8L9Uj0Y+kHvQvNIzN5JJKtceleoICHZasWntav2wRRnUPy8

QxNCcAIGia6EkhxuYIXJmGmmj/bWB8nNRSsQp7JhpO
PwF97eK6I2gjBKEx/cayXlXlLw3zEl9rCIgyqfEdKxPEWGO5Q6Al5vimVF0mgZUSK/rtuGgym8Kw7Xo5

9h2n7sQf6spKeK0y/K3ty1gQWivM0Kie8wPSoN+f2Xy/3GN/3Ogw+8pX8nvOnGrdEwDzgPEVpuQ9pjlp

U3C8nooLwuY8Fw6QdOmX6JkRsAA0DNENzGrV0pkTuY
I68JTEmbIRE84JL1O+/JFi6lrFUA2dg472+HhBWjULBcZ+CucaMOkhkeYyD6I+utzjpgyssroGMKPV1p

eK0qQSjUSSSCikO7PdEQV/TssuvvMvQu/SQSVeWXQYgIIGrtEfOa1nffpJd2XmDc69jKUg5dwqeUluoZ

GlewFQ3scO0cISdktWMp9k6dj9e0G+0l2Y0VB/F0Av
Gl9qhKEUJmtWDNxeGO8vYIpNc0UZVG37DyuDNQE78p+l+LYBC2ud3oephh0zjX8hh45dxdKemV80X6Zg

he8e5SvF1lJUC9ySf7eG+eOU6102YWD+0cCO2A77fmh7ZHW9xUSjxZmWTwD9hr+tQXFwdJ3wFTsgcGjQ

1ccX5HNZNx730sCLzk0AyUCt2F5DV0nv5cibbHnj8x
SfU1+RZZ9fA6+oTroG06QAtEgUy8GlIrESSH7Yo6Apk03tD0yk79BkEyp7o4LOj09OTvCs1Xy1KDfer8

JoWMiwzyouSie0/qp7c1p+TEe8ot0XSv0H1h2bKAeYIXIYFjgJN6jGrPi3p1N/gCjQjIbS6WINbODdH7

8xstZ3O3T5B8kB4OVwGypWR/hylX5AZmX+pTxCWtTB
LSfjhKUgszUjRDELsVK22sv9Z15b5Q1m5TgNjJTruOjmqXyfsfpV25+/+0SxtbVG7ZRelD42d72J9wWk

zE+BvcUuZm8x9rPwXPfkrWuMzYeDVy73gQdKxcwx1+BaeMHnGuDskIKc3s1XBdWmOvFC/NZEoquyv9cO

sPDxxvM4GLLuMXz6m+R7/5e0S3C/JdlMx9EDP3ucK9
bVQ6HvClFARxgS09Vpxx6zpwdyzI9rA1iR9qk+wFrurriURmZOBUeXmpQ8tY5NtfO7uKLpXt18dFGdgy

aCm4vWYWNq/kpN0iybvPanuGpFH5t44naKbKiWopmIh9dcwKmPK3NlM1r5Vc3EkFxWsA+cVtbZTJ7pfq

0t/qGj7tp8xuzwfDG2XBmFxOEvyvzsVbI70wfwPZZt
x9KB88DqV5g7RDWDcH5tJyh0dpQEVESAkugZM7M2R7Tw+Acg4sRerGXfRSapi36m3U1e7tbatjKElwdB

6UYI75GWF0c7DuvP1aDfv9mWTStvq36owq8ZdkYQjydXBWw104Fw4+LS+dhmWWug3bJlzvxkijb+WQvZ

kynMkMaG8ZKZXl7JwR/Cs2UArQ8WejiRM7iv/hJ8jB
8a+r2MZl3u2fW86lzFVSE/zT2x6OMOcH0jzklX1dacG3onqRBBHqFD0INDC20KkLEzJevOLa2M6o3/pV

vbW2D5MfQSplF1jkTLNKlyIrqB0Eviw/Ka/oah4cBK2WXOFdP5tI5kn9s1NHNRg7/0HynGYNY8ycVU1b

haSS40cyFwhC+OjTAt1okW3pYeddCkBH/L0eHW4Tmg
hwUvATEZIfpmC820aviEC7GGh58Ie+U+LgVYRyFcQKk7qrs8ZcXP8PS37N0EZq2u+HkI7D8nWseDDTc8

6LL5kD4a2ltXe0OQLA7jc3Uqj+CeTqtQtMJgmCNrhsV51/7JraoiFfzEutlvS6Wx2okWeocgV27H4+o8

cizZjPx5zUSXVTmymKoHymxVqMbf+0Dlu3Lqd1GnE9
yjw3amAnO9s0DUpdGbqM3K2qqDjyW4+P4mL8/1KNBAsioBqZ38IWOyH2LC79cLfXfQSMAZiHzcTNN9ZI

+bsY32vo2/zowVJ4zOdiXcuqz7CHEMcI46RXvCwnIli5OCACr8MRHJ2V9tm/feg8EnB+Rqqt7iGN4Vzk

fJg1V1ajaab+0c0keF55moeYIqqBMJQum6acxgovZn
kVo+Pucu/7YvQyZ/0372zfd6Pqr0McDWMLuqx7rzJEwIX1t+6FpdA0xX9kbP21SO2R9a6mB4kzos6ErD

25G4f+4c/Cn5eGIvCRyQpbFPz5TUpUHKvyGYD+ljK4mOdvebof5y9oA4j7eojY/Op9NejShKrV1QcErE

SAg1ZE+RQzMgTV9DyY1DK2NL006T3KBdujjHACeZyE
Sd5n+M/UCv0RgbgCZIEDxwSdXqtnX30meZ875DU9H0yJOOwqe52EUlXLtrToeZ0p5zhDF0+qKC+1Z24C

oEHhTLjH1C84wMVZjnf2pqolzKt+U9O+OkEp59MC0ST9LIrFaVOyvvZuPWkzw1pjYQ/yavLRveqQHopU

Wg+XoCns/IyX2LXFeBK6qFZ3XMPnwoLlkWprAHgtKV
Ano4DDDhGfY1Gptlk77ZHdIn6oBt5KIu98LZUPGEzg/VQ9uX3CRORxkqHEvEDJwJz1/mbgXSyAlaO3+/

fc7qa1gOmKKp8+tFPBA2h8vBlpQo8O7bkReHzhXFUYkGLbOSlMS80MJj30X2B4rGWBbyKzF5Rk8wPrr5

pB1Lb4Zcbp5FiSQztKLa3OJIPYk2awZNIuCxlgZz2p
AjEbTZUTVRR99u3f2G9FsHIslvKBMWspL9zADTz/LdLwlbVGbrBv8f6uytQxjVnJsFisedhPhMl+nbi9

PvdNzJJEyx8cdVXRb+BuvQtbeWa8xDTgdSs1kqPYLe+ueYDrmFxPrT6m2vx42xwKWHVN3acuvW7dlguz

0KWrkmzjboaW516TgGSDU0ZZLi4iNY6Grgy31b6qd8
vdkZBtjlrzp9Lj/DV53BtMfFBP2GsW7V7ksdNEr6vU6Q2kO8AMA+xk3zGA9vw/8AqAJxqiego04WD6Hd

S4vtrdS9W18vXduKhDsZJ4TxEDlEZjxsFB8InQrmeGeBR9LahfSRChdltt14TevpXngtzu9r/LjsSytQ

wzzQTOwCzkpYTLJp/OXF5dwcAmAQ1qfVNTQZF90OkN
fXCs477EvzX+ovoYp7+IvRR9H0xtOTxt1oa2BPbMf8A52TwsCgQiScx4yCth6b4KHkOdBMn1/JO61pOy

ZVRFZzBMmWZGONbDxG6N8qbzPv8ox7q+uL/L6oj4kAuBz4nC7acmD8+LtaouMhWiKZv/Nr/VWZfGf3fV

g2MCzSshiyz/Knt35+Pc42LOed/f58m+34l9AtlKa6
9xS5n3n90+TUwHfWFybAJpih6s3qyubLfInVLqPYF+snjwTlmefJx19lS8r5v5ccBOBNqrpikHlaNYRq

j4HtMdgFzpqPUq+G8pEqaXab0OSFBKxri63U4pRAv/P7AXRWoXJJG9sfiZAt/cwqWcdLng2uac/6DhDl

Kmi1n5Kq16NHmWGBfpl9rO/a9Yc5UnpjQ/nvjTiHek
cpskMvtplSWes1IR2QOhyrQBkpqSYQtBBLPAjHaxcJJXQnQVU93+FwC7n2+uMDLq6P/BuD6Y3ODHT1ld

1ktfVMqMyZJ18MooDiG45oilO4PMXgRWNMVHf9HkEwGXWrPOxeWQ8Kym3aqNM7AX+r1HNtWMkypWxQQ5

QeFafHmHpdI98oNhxivuqtWR/Xb4Yh6I8DJYzB7RKe
Zy/3Ql27cio4JsyVG/8tDkD/oYQ8LUYQiy0tF14UzHETotkkjqImBHmh0RUCzhYQ5j/dTCmFkw1lYTWU

yYRR7/CREZmZ48s5lbEjvf/Xc1zOQJ9H2WOezGOZ7hXLY0dSN6i1SGwGJ+e3v8pndYh/zbu8/S58httD

AYYDBN0L52jH82gNkLAjyeKmEdksbrmrm78b1a+rVv
YJ9lBs2sopOh04BmRKP98M0mYiwexiWShBtye9yM7v9GgmdAVZ/UTTwaqf5yoN2NjhqP+TjCBOWY5rIW

ZlGTOcfqfJfvofrXqfrjl7zuwlqKSkQZgllYFg5eawIr2m+opyIt2sqUm5K0Hkmdqwt40Ut0wD247Emo

P9Cdf7vb3qfW/4YFfDA+6LICzJt7dIbc7XcxZ8lz0w
wy9i/12M6KxgLy4BqyMaIJFG5TxZ8xhbn8UKezOvCEekLsII/aXC7RTqA5d1y1sUwhfETYwP1lcwBuKC

0e3PQ5ljaCw/RpUuB7eYpGLVHbnUawyfHIqm26Nq3Exlnaf6+JTmBimFsJWQ3YE040eo/IAVhag+b28b

sq9h2aHIeedbjqlEdqy/0UJSYNW3pjOEyB3lbYVxGj
uVccsEZOmnZx4oT/K3Aa1NceYCEClA1IMO1OF3x3Kp/y74YS+uJiM9iXrccdI/xR+WmdQPSK2tw9YeJ2

yN8C3qxR4SjF7odJqN8EcTv4I1YofQnDkJr9m2Ch/rvVoynA7v2KyW0fNNXGb12/u7N194U/9aONwH7m

PloNzNdEPqio8dmyIxRCQXGZLsY7e7i8Ebi/5GMCLb
+S99ubrmJV0LigkdgT1eYcyaVrnSBSZ8BBrbxxoQc3llbVy1XToUMw2vk96U8Sey/nY7sRcILKEXseKh

Qg29CGBQQl8TZdajaqVm0DtiFBBK8jqU0Lqm3ZYTIPNSsLHUl3EUO6Ut+PmB7IRKJwaRhJkj3IJ6MC7+

9uGYS1pvFpUKKfBYnDPJnmu9dyh3HIX8iSoQQTYho7
ybUgyUo8IBXvrrZZsm2prjxSMHFAKrPMGYt4CgiRMvehAku/w+FmsJHUY459GNI06TndtZEU0GX3QVjd

atrE59H5/kQNH0nQSt+4xPiJ89ExcDpmXBwenmzrG/2NhWPboLlkqaKTLckB6hhXtujVAtsBg2jZarPo

Gf8M2y932iJNshQRaG+p3QNieO4atVDV04AckPR9fF
9yo2gjlmFUo3pXLaA9GZrKE3h2PyORP4ZBcDzXaciBVG/fnWXgcro33UqS9ddvDU5/rV/KM7rTAOK7Om

83Oae/RKqa3vefWhktYEJJ/ex8xSjydvr2bVztS4QYHsYXZ0T2kcq0Ob5HorJTAS2dyu2xuQY5PnbwCt

qZHMBiOpfbz5u0ockqGWe5W1sT9kxuGOBT8Xs878OT
x65rCaqEwB+AMnLlR46gABQJT3+vB+FB9AJO6kKHGrigloS1mrqqcXN676zpUOOBZtNytJKnUN4grEI9

glWB0kwfSLb4eC3ny30TM1tCz7uwBBXmyFI/5L2q6YLNbEcboN8/j7JmQogCVYJS6JSawuH22sVS5dLL

yFkbMqF6DCuUssz9GG7p3kw8T0i5pW6FJ9adn53OgF
61jGVFlHMPNOdI2EjMXUp/KJyRaUa4pkl3rtbp9NartKSmzJ7hcGTVOBuCGUo6gZoAeAcNnkPNA33oXF

OPhq6ytThf1g7dRv4rhsN/LhrtY3TJm0p//Wg6Jbe6bezD6S/v0QkNrpSZvaPe/T7h/aMTAp2xQLIHn1

izzTn2wiZ4nhu7t8P1z2Zm6I475x0GMg6SV4ZBp1je
YhnDNN+ZxjgEhOMH2JgNAZOlDLdvgxH5ootRI4PAq6k6ya2bDLdwyiBFrzkFDf6QHB8x8ukuuCUYlfxV

xd4KiHoEG+iNfJILgNy7z7HU0HdEJkIlLPvN4deuWNs6MZuAeIhgcamdh82hvEuSuc5KNXEr3ogC1P/E

Y0LMOAA/kKC2HWLEWEtC4HNz1fqh+IFv8VMvXJp3dT
lN/i/EVHx05Q04ybmXXJrhPWhhe94JWVIERD85a6iEjp+Eqc59J6j+3p+gpnMxseNOLkChXXDlsrRk0E

42KAiTFuiOPTKKdmYDfZW4Y8azHilmPwpnUJ7TrXYJNUPHWWe404bpn/xnA7+m9jLP+9ILWaagFs3PFU

7u89c7vzizdxcb03hBolJ1MN/f9WedBmRLuioByoPC
3sVx8SkbaL7tQyMYbH5YhfkLutJ86CupoZ235OLxTmTaVssm+1gd9Dik7kOseQUnXQL0f5IxCn2Lgryu

9EAb38XstxaI3QPkZaH+IEYfQrks1EcGeOTUkLUuQuwzPHppkwqLZAISpe+NF+KJQwQV9dgkZ5cSJNB0

xUbIvCp8YfbbmqCJ+ItJFZyVpBuyCfte2klZG0FXD5
40KebtFYgkJCTi192OhQuU9bu5UzAnuoGZfj/D1bddqwGQPt/bU2ojZEv/LJgoV3d3OXnpFKimB8Zuq3

ZxXSt1rsj2aSPs7R0pHhxkW2ZZSpODj10PlSjrtHI7F00fkskiWvbrN4Lv8aK6TS62RzMxNnCFSUjYI/

UNT298r15XawHmhChmIJc29lByLAexu3ok2OpW4yLs
wfuXl/j666M66GMjBvrnVbWtg9Fgt2yhgPGVUg4PXGzwA/P9arPbXa/xBrYfGpS1OvJ6Wefy5pShX6Nm

6Lx8TgDtes+dFtrGAUKzn9iG5LsQvWRKb5t/U41bZO4QAMNMItBMZCfJv6pQwuLmG61zfysok5zQe3Dd

YnrGpJzZBzLGyABct2eliJIfW9XJ9Qoztum9cZfwy3
zoqr1pWzMwYv1Ayc/kBgHXpE/NT3djyaPWwCjLHHU6lr4izuPGHzdOAkkk1ITURpudaTRrRqBvsQOCcb

nD7c/GFShAH99YT3r8qhPs30oBKCm2pT4hiD+cQrEh3OXHPg12srnwyS33fsVMrc4bwb78164KHKYcsx

ui3lhqOyY5y7LgzNwfp1GMYd9ct9yafGNX2aKu8IUo
EUEUGqN6IVCzgtRzpHbFDzA2axjr4UzM429sRnGZGGuOppZ+DZxJaUauhR7Ai9pvB56rIF81eHsrwlOd

s6YQkTSurdyhzvixC9hZfq2ybVn6Nl8Ua2pcdJdOo3dZZIZOut0ckL4AlqV28e+6XxzI6ko9wlaOozUx

0YEdKA8DCeyRI1xrxa61DZC1/VOV7/fxzM8xsXT9Cf
NtvogM27H/ET7Gc9ULwDexxJTvlRDXCA+0Emhe6rFjtdHdOilbzVDVOueYqD1mhR+FAIFLFhXXh6aGL6

wlUztuNWy2873kDjEsjLIGJh5o3UhLQD54B2YHShFeFm5ODEKxAnpkpkRUpJwea9zwFG2jvHc8k09aW7

Vpjquif8neWjTGX+YfCq6gfDqBsv8TOcT6fzrv6qJs
piKziP0JL2ueDIBwMMZiO05IsNRWdpjXQiKhX8aPSPvEwlHQtcclz2hcNYdUU9kfEhR3s1QiL9XJQoOd

27sFbKxAbfIWl2fWLc/fKolpJhMrU6mM7F9bO972EhxlX7hIVtbjKqguGkkdocQzNkkwxCINDwSX4eM3

HoxMqwShYn5GKjI+OQt6LgleYz638+fkVN/Mql+U+U
k7ltaJed23wHKi7ZS1c9T/qYd0Ir7d/dm4RzQS7XuCFO4wZNONVFbsxjHZu/YFyHhW2H0J/4Up3N6ZnF

/z34etkf8LE9GLsATI0O0QdgIm2610diSbW/SaT8/RFDkWdKYmxS2Ko+Y1NTucz3RsLugdE8G1fbzZJz

zxQBLb5eHK4Za75IfRAAV731OKnVsdexLR5a+7zgC3
KizYeenGPDqw3muNrLVF7Gx+EptyNGmMlUPqYPmBiaqXhTp/4K9UeQcjNyiKgQp8v0S33rmsBFQKjmdy

J6oOulP7jstgYgYYPBtmM3PVV9uSubI0M0N3mw39g1WMNyftolpPHWGsXLo99OOy81TgYbRnXokF5FAL

lBMAcH1WArE6DSAxx9G7jl0ANDCeLFylHthkzaCHQN
JGdD+oi7EwG++BX0+iVOiPmXHOZkg/K1G3l5GtEu3yK79FMMWo8HAZthIhZ1hsp61/gyrhynlhmyRhO2

S2o/w9RRNI3An3sictMKyWbo0PNRwrgh0YFmpsw2kUYzeByRlS9oVV0y9GOG+Oo5Dht5kZIq1+6snhGw

9m/h0OjXenfDi5kZ92y/xYOkCoxLr/AlM7VPk9w5RN
mZ9vOulV/VNmO/8ZxwBQ/3YrIUrQ6osd7YANw0aoL2qhUB07kEnYiXRj2pywKCjU12xMJ9wvRUz3P98q

4kbaNAHcqruwJyp5VTula7qFc4as0tg1xi3z0//JX+NcY6asxazOw4uiugWURKSPrCjECbvhkeZxU8Xl

Cq/JidFYa3oKIHVS+1cgU+XKTZGWLxkEikWs3Gokio
QVoroosO6DGwhgedqFlfJRUxRaA7hKpB/w5ARaJ7vRWd/ZOA67eAqM0hhiXDaLfF8xffnVZhlnrQfPBo

lB/rAoj+XqzjSBhAsKC3Kr3aP8Gsf2c8L/Zr+HFUM2AILbyWh3P8HCgV+/yEuQy8c2MpGiVSuRI2k/mH

Vs2RWV87985FO5OcOsWcd2ei4DwLTZ33qmPgLGkur9
vYe+U4H3NJhWYzHiOjYQ6JzcfxXSiPqWdTFIcJucCSB40y64mld/NJ38qiqmXQ0dJ8RnwwGZBn4PyINg

k2dWsG4i8UMFwxwShb6SlHQuKZjYyRajzwIU28b0fouheIQuqSUtN+ihfQuWvGJrhxBTtLNL9YFD0uVk

Q/36oTI9xlAtGDwMhYcgFS8BTt/+snvvUjd1Xm40g/
9C1PQrsKXDP3Axsm2aZiyffcA1pe2Uxa/BLyxtX4jVdnY8P0HWy8coQ7I3o9Ar7WiguRlzZ92UMO+9pw

pnMb7j/ECV3DEvBM7qi6aXNjuz/gi4Qw0kW+ueWj8OD34bnXS57IJfsftYZ6m6jo/Otac3VP6+IRKMho

dDR+v9mORWwStgLRu6cbF/uG+Xb2NvxMpf9OtM
9/DQmrr+T3bKLas0+98L3aIR82AT85HTj0FqP9Neudr9KTcinN/UPnrHD7+9MnaiTXttlFuxKoXL0Bce

2kLPPuLeP85jyEoyNLK3Pt8GzC3ckG1GHRnyXX38VgYeF2n1UBiJMAul7ZYndPK6QjGSjKk01dpr34+P

dLhFUHT60iviY4MUzqTajBTphxfEMPfk1/rzeqes1s
Bx3lpQw6/U7uo8mXui82BXRynO/6sfzdQgROAML8PZRo0vOo0ma0necMGq6/eOV0k5qGp8xlsyM29feF

KYMchlC8lX/ldMbouo8tW2etb8JizBWMmS4G6oC5DBKmt8FZuj4K5d2Pe/DUk8n1FnOfPhixIhcwiLq3

m5hBH7sIK0cKYVEmBrzo7+KBV4FdNGUdaLd+344+s/
Dkifk6WwQ9G5C1nGgiFz2uJO4ZF2QCSD/pbanCEtW0mjtxG5BUA4pK7nU8V5Xe0m6e7a2stHFLj1TqVf

SyNdydgv4lRuDEErxhJ2LLyVae1m6iHzYCShxx9FLhaiRGFHUsN3oolTEq/pNc0O8NzM6f0o6bt3UzS2

l48FRnyF4LAPnC8yAdbeMmB4of+f1ha1oO4ye2y0KG
8NZdjbgAY9aAkhzoaHtTuA/0MrHvsWp9VtZYKWyTD5nkHSyRT1fwmf06o33LUH6iQWK5xE9xb1F9OQcI

JDyHMz1v+GKGN8f+0GEJMPiHIWfcf1wI1m+hlAc1AoU+y+58h6H5GfKpg9zPA0Yb/3BWqTutbzd38dIV

McBbAu7JZcOxDB/oCPfl7bs8L0TgHjoAL1fWsbhpxl
sWbY06BiEtMI82c3gGwt4FmZ0tlPO9ylb1c0POAdQXoUbuBSFuKDtS+cAAPJaALJx0b0eT0zNPJW4m8z

dkxS089Cp3rBxAF+kJ+HnBhp8pC2elnsqfkNdFkb68FPtqSCwoa6lbTf7XkjUK13CkUcmyMhLV3Vyr2w

dC+1nnhCvOdeQwL0jd20OF3mRYAsxfBDZziSaJopfL
XRHvEsP2YAxPotdJLoMTdpTG2WMC1bmKs0ZJNcYNce969hujaUrYRM2qhO+kDJp8/CLq+t5LklF/IwD2

+Xc99SF1bog5YOh6EA9+55tXt2nJB2/dmMXvx7YoeFMqXL4cgdbw/8DGNYGt3uwDmWYDwd18mxUzGHCf

tRVXUtQo+bs2z1g2wnImmYSZmdKIe8oV45K16Zk7ca
V+At36zfmJwCwFqkIKE7IYVVP+57vk2gY9RNVvFghNhMiJ3+cyD1VPAoEEaP+pOyWBdLturQmRR+VLcX

hCvvTW9wrqBGVS+d5BSYCanL71GrMAiQj8Mlour9MenXBypYfA0P8tERjl6jnh+GLEp6yiN16ZjwZsau

1IVmj3M5K6s+S/Fx8OGasG/3HQdanSsshPGMk0TyGI
x7c0v/E+yjHvGK2+QmNvqXIOxxAQs/7QRYg929i3bfO5h3iq30CEeSBsDS4dDOgJVd9WKzov1rYlOL+b

6GsyTnGnjJ6rCongxwybWM2tqExPt30QjfBpYiW9u0lbHCy7m4m9Tcy2BgwSBfbVtvLYN65P8SUhspMx

d+Ll+Um5X8wOTeG/pMEao1mNoTO/U15FwmIewQrH9V
C88XnjNcIQndPdii+DNUKksM8PfFH6vcrP8sWoLTy9ElJiSdw13ymaR/Mzy5Vn0TjTe/dpGrpWAgiw1O

TnT3ALkHDs3Egzbeetxz3zvj2c8FLEIsZDax44kAmBIksRINCfcT2exT5K3LeTG/a3hR74HdNyIFVsNW

qSLNyi0p9R1lr0qqVwvqbNDfkZMGOCqLVt0kIAJ63F
tyuG1Yl0cljqyjCPol9vUGJqIOGsEJhPEKfu5dy92iJdHVrRxuLfO+x+0kC+3nwOEHM1Xsot2C1KRVzW

YH2Nh0SBNAte7I5TYnrq45MjrjtlgLyQU3zR9jewNwUnmpoytxUqQnVmtmaUqRCjev3OxYX5n5V8zo3h

+exWR0v8+T87XsZdcelhyy9J9R68/RaGPJ/QgJnWGP
CY2Y8mI388qk3Mqa8+tE1E+WrONgyDmz32L69rU/64ecYqr0EYZMRdAT5X+g4t+kRVJmU5xJ0EHSOZ1X

WclnICpSXL0aWft3m6rX9yFdqlz7MqfecRnvZhJ7pR4jICNN6bjTOZ/orCtqGCqq8RkIvnO17DC/4r5d

h9cWheRigtPUrjGhmrVKk1sI1dBS8GqQYyT9Ah/dRS
S3sVuU7djgC28GIdCwTgnTvBRqG+mUTxICtdPB8pJwq6T9QWwti44UvQIancQ/ACzeuc3gQdi9S+A5ou

Txg4v64WuRjCn23D1iT/scWoy2i9XOFRp8k7nZjbHYqQNU5JTwPk9o0CXpgg9HA0rV4Shf3WFP8LRoVF

xZhWV/xjVm5ueAGRdmB4/8eMzKpI9TA97+nbTl41t3
ro3Nic2Bp1O5mpuzBVKLymmOYjCCuQ2ih/TV/j+4SFMrnfVgBjWNZCkHak5C7pSi7zJke4vjTC1v2prl

E2wb/k974trj8m13kN/I7/tj12kWR4qgyeOI/hq1qlxSOFQoGFER+o+9CUkv4OuJWEveowa+JDvA4M4a

tONNztv3Jgkf34W8EJ2R4C4pRbSB0YEMaJMENiSVrz
1VJEUa8p0acPR/cDr3sRYOVJCDwCxK71Iwm4wa9QVmx76gcI9Uqi+U5AbCFaT6J8eBkGR2h863GySG2Y

2wTv1V3VqJkDy7Q2ecWdB3jbMIsQKM2On8/5tgo4MnWMFiPDELKN0MCmvdud6ohHZv7OKhLpqbj13Ffc

jgOyi6JTIYWicf+0B/1tbx9RDV9VBK6wd1a+sxNxD7
83zp3sqpUenoi0prjgbVyQBReaMoxiX4loEIGiwAxh9wqq7lc0wEIth5Iw3n0D9vzUa7lst7kjG9+bad

PjCuHsYW2ixQD6edgHKnF1NjfIvKk8TwXH2Q0a8VWGPUxnb8UKDv5Xkd9qU1sXr/QEUjiHTHKlKIBlTi

Qh5VCRfRryAwNMEUtYTyX0E1dN5wTLKLZwkvp3JFk5
tL+Tl0IATRd70Io+lYFhTWlQBLDnhO9p9zxkj9dhTh6i/i+EnucyYKwsbUd42Mtv5+s+svorJphYeJsY

4e7Bgtar0MuzDQcD1WvecxS9kol+JBUhHUhdvqdG/X0zSqoMnpiFVWi8woLoZvmGx1yGW0laC8E1e+7w

2dkzHc+seCBlifuqeQb70hc7Ut40b6zL9K88S8g5/R
x/S5D4kFBM0B49UnTGX/ctAYMj7qVSCP77M0rmXhrfAk2oA62tm3U3Ntbr8HOBkgir8qVI4QXIimXoAM

uUamggnlIep9qNDXx9Tb7CUQFMU7VaFYJpRL2vsGJYRjhI//lTgJGRWAjl8Kdxmqp4lYwqi0OPIp197s

S5YFyoRieCmfUQKK5u5hFm+WY5JFW3+Sg6Itccv69r
wpY/SlqRd8iphdu/zrOWSOBwbzovHJLkOzcBZrc3YK13gxzL3EB+sV+T3h65+lfS34SBrC8GX7hOH0RY

QFZnv6yJK+Bi054y8jrDjEzL9zONpFa5NVWJgm9ZtC0AQ0nTGziWHMRxgH7BqjRVyu5flFAKAauZ406T

EwUt3rKYbFt0k3ksmjFFfBR3ZX5kCHLqdqGkfBJSKr
bJdUOdR/zBnJwJhP1RCoy0p/G5wg7160l/2ko2L9ZUW4UDd6m18AcIgrxJ9xs6XcI4zWSCxVoE6vebn0

SJkwXKnXLPvOUDya/mZBf+qsrcfukZa8BGMqUwxnO50udPLYwoC4Ze07dN+/OrL+o2fGkBas0yWUITNn

qmP7LYtcMvHhZMva/v3yltSjfjJ9vziV4FqEJKe+OB
Bh2fulm8l8RzJUXIR6FNjZWMHv+sEBlBlYu6MywyjNiXJI7idtn3YOjOk7lYFUfSibjVfjRXc7qBRvFY

j/aCNQVMNNO2lJ0dc2L9kZK2gO4CU6X9peYJ9sWFVs+lCo4n73P1UgB5Vk9AMqV3lEJ1Y/pkOvwn5hXt

gW2vIuNRNbjJZrGaYoUI26Jye+Kq9k+2eLvNfik4rv
c2dDpDZFbKWO121tKDjew/CRYhzLGTx25jcxGH5nGR3cC3QRzq27otZ4aEi6kpBVGCe9CSBMoEMolOOJ

MA90Z5ud/V3yiZ7Sx6bdCfONcsE3p45VTcB5zBF37JZOrJQnqkxdfA+6SRgEJXmBCMbfgtDu4Za+wd4I

Sxp06sw0l/wheLaXeJ+s3GFk3vD9vSArLPHCWGqhxj
OkxYuECU1xKKEUI5exqc6dkVY7/+90K/dRCgZ3rKZ3uQYBbCrU+6PkAYp7jyJSplizb8EPqMaxDcTi1n

iKQZnYZV8eAxrinJgsFMcjjEz9jlIJKWjnaZ3hkj+Wyi6bfjTUG3F4v6RcMhxgFqpmMe7AzrZh2zCONP

1zFhpOogfYM0bbCZl8JNjCAL21Mk4TajGc8JW6wBNB
DBtt7WEbviXjfs8q079YSAIilujDOVY/038aJ7WbWMjL0PXcVrE9VG+ukmYuPzy/SiWWxemilpg9TC1t

0ezLr8mFeKz+Uxqbf2a2T5JxdcJndGZdye7GwfYL2GnS6Qm7FGbQAgzzvHl29wr8jOssS03d9ZjfO295

abVmlGIYhp0Z3srzwADjR76zKsj3N6dsGNYolS/Usz
FzxiDrcOee+O0gxTrJin1Z8nC32WJ6+Q1kvDgOuTZyKOt+qV7rYhkaVuroEvrCNgCwj1AyZ5VHrZD/5R

akA7ocoZlRIgTGuh3UG7aXyLyLVT0F2Bh7sZWH3rNnXjilXEEAbHDrxzGPuc2Efty+pKI47okVuhXdyg

35a/kiZovHvaVQHPyuRpnjqnBzn9QTdlm99epuC84r
KW7CIa21PVIV3+0Mq0trZeVBnE9MV9+a4vX7AewwFsJ7yyx6nytQIu+ywnOtH+4FTNWFDMD1XNUdvpiJ

+AfUK7AMLsDSEf5Uhay9DYPJ6E1IECnlPt+LF5euPPMjyIjDPsCidRFVaSKm+VefP9LOMzUNWm6eCfGL

tTN3TghRzw8+m+SDy0tSMLx/71JQGrDzBxtU/KUu26
Gi+CGD9U4YJ3brtuJ8P4unzq/5TmibOsNVoDT6cYfNjr+Nbog7QEk18ivtb9YCpLxdqcuSwpGgPdQnCI

kHdktVy4K4Q6yyafeG64SCEP3dIFfECQXJWWmQUKge0r7Gqks2UorQoBaLHLcCf/lUGp4t/SXrqD7ixT

FVqjE7HaqPo5W/+XXH3+3Ihjukz9kd5OgGIxX9quGJ
LX9JG0YBdG7J0Ry7PHStNT3yAIpoBkHnxWatanNroOXYy8QTGRNRMbLPmZCM3ixUJYKwuNPJu81XJLUc

k+yg/ui9uG44rxqtvIyp91aYt3y3H/9c1x8qkTtnbwreAmNcuq0iuzPW8SSzD1BNBh7OKSBE3+luyyYq

QCIHowqV+gxfK/efve/cMtehFxsb99DuwrfDHtlxoe
yLdZDNM/u8iYo8kgux9lYacOnDx/yBkwH5ovAqwNbITdVomwcT1R6YGt/+rVQJf0Qt1hz2/xQmzU/tF4

3rSuJ4XHSU5/unEjT6gx/mNR1aJ9eiLwd8WDLeFaJFQKP1Eq+GdsssbioTRfLQ9GCvupDbFccZcmclL8

x5vauXxAmP5uqc3C/Ef+P5stPScNCc/wsJ7aoJmRWz
3gOeVGh4YfoBdOoCur25vYOMmVStF3w3CYV1uNSLNG6a+rroCVJLhyJCVN4jnuo/MqR5ewRVyhE3ZzQt

8REgb4IfxK8eWMVSToTX2F6dWO/5kBnvi8ktFMrt6b8cT9Ef+P0HM2lSP7RD9sV10EZfNHP11Q1TwXBJ

imPh6DesG972R7Pj0gQHhwq5U7/6OAtOc54ZeN+/dU
m6Bs21MQayHPpckUVv4P1bn2OmhgbRpt6P0h8bV/hCwlDEpUxf/y5mwM4OCqXdaSHHoofkKhEiwyyu+1

qoQoScEBGsoe3/4WgrIVhBzivTjwQSUTGaMbcbX7+aaMusx3v5pHJiuU9Rv8n9ZpyvTCi5IBW+9vmDbX

oD89SGrfF1OvXKVayAWRboF/zFK4jN7qe0mAKGiAZC
imsGL0aqoti+IH7kjBxo3ZN7iJTAZ49c0JESvBzCNLzx+5CMw7QXToZGCEMctxGJ16Lf8FlodiJUtxJS

AFWGvuO6CqVXB/aZmIzkowTgZsWEioNeugYdOmlRRyFhlBOXRuEwh0cG+HIPaBhUZpXmUTO0c3H2vgIK

8rGOd82aENaES1YHGiPDQbI3tA6CB6BtOQ4w3fCgNR
81Vcs3ga/x2tZBjGzT2ATDVocwVMZLL0Rn9CG7+lbs4eKTx6ql+CCwxji58wwHBIY7wDppmmFuz6cLn8

LVhN+LiqcOS4wv6afwHaXrnHGK3fEVk5+Sl4FDOCP0cNTGiQEaGF4WK7eWEToPdDeoFp+5c58VuvSZXT

1JzkwOOWDXUR/sDicWm7gGuWQuzQh7/3EE4LL/vfnX
eUA/W46WojkZiVeRe+hvk5akTf8dfQidHhyN1h9SUKj++5H7mUiyw5Y3xtr7HJ3G53MfV9ylcOALwLUh

bj4+RrA3Hyy1YnZGqDmZHH73ceD2wqMAT3g4t/efN/0RKwtUqI4Cbz7I8Zqg5cuaU7NnD8lC59UdzmZs

wfsVNmky7HmsgSINAEsreY+c/tBv+a0CzK9lAMRSWs
okzmNoVE6lZTg1TBoaP0r5bXl5N9Ck5SwMjqYxXAPaU29zdr4OHCrIm6UpPp7/aPS1LjSvlMDpum5HmW

woUlDwlznkCBelbwNuXiZLIlWeyI04UxxEWsiKzdGv7ylBBn2gFCRjHrwQ76OMwvjmark0QmwELjeocQ

14EUItdEpTtrSYTq6e1Bww8Xk6j6GkyuS3XdsJytjc
ZOqQvgL+AbDTiQ8Pmswwgs8gGYXYVo7O6pb0Cxt80rEY/aqDBciq0aWbiiTQIM5u7zKwfLXr/vU9Ccnm

fIriLgg7piaLqXh214cOAXp29PhXX/u6/JMmldvNgoSgvkprqDPC0YogdwNwoYcTqyjxRZQbtAQw3xW4

D1anGWDTJVcjXopgrdAayBe+51QFItvwBExlztG80W
cWvyGe0DaxrmL0BCgqN8Lfae7RCwmUYC5JKKGaJrpHyD9p1aZg+k/g+VDGv0ZlAu3yFVIhcZn1YTSYu6

pHsaCo5CuoKaF4QWa/jaGfWv0Vstn5lutfUkpmf80PkkAqAN5iNe3eAgg5oUk4XJOqx+bSdip3uF9o0H

16bHscoOaLMbA4dSiLS97W/BtQt2xntowcCit+USsI
NcRkrTs9ZS+m7HANd2x11Kthzi5HF0Buavts0ejfm7zwp4ynYcRyAbx9NrAtCGGuBGeY/GiaR2SSKSu0

mN2sp1GCOqvEX36KrhgJ/iUxAEGfaCiARc5r7uwUyaVwGviQ6rqi09bf/WvksvjgTzkcUyA7O1lwF3eN

pZ7yy1J9sDy9zom+8paykJXe/iwgWPp5zgunId5fax
mTdvl0vGCIcxxIAvKNhKNAlKxG8UVZJjJScubyeGfblcjDUNVadwKJ0de3s7mUP1haF+GKUIghf1fxEL

BUy7+mp5GBq3HvA1fa/j9Mr76cnJ7/8EQ5BGuQ/4Bt1Sazq07yZTE3SDcCdNmooUIvpoz9/PI/ywqsH+

P2wiFgFuXk2OJQ7VX/wA5pQirue9rBe0JWBeaATgmN
fs++upDPOD5J/RODoe83IoiycuLCP+0adOHGYiFVmmt7H5oPmWhq/5jlWH2lMbr04THhUAsHGCqRTLep

9amp4nmlDx7lwt5gu1HpAp1SqRjPkWmw1lPOxsC0qCXaeX+e46Ji/+NdFi0TqRfGqQNRhXP//9VoCuT2

U20bjOfDe+uhxJicRimSi4VjXE5nffsnJWqvrBxs89
Sck7c9hBb0nFwqIMxKekUL24c9lWbjl7yYJJBIXTsJsF2BpU5qPGgYSKXQCRU3C8oWGW9blVIoDqhdAm

UrIT9WgkPQLDyGGd3n07ntdtQYoH293bf+9NAzA1YEp5U2JuTjOqn39apeDQ8vLgezHyFMpExZtHB+Mills

aPnhlv1ky4CwCra2pI9Y5IRJcedVp4ul/wJEsUQXA5
Q8ZHjIombF3UN5SKBO/YtCnMQi8dV/X02xB+ImgSezPnlbZatKdO5sCwYYFZ0bknuYAn2kvaRarV+cns

2JWcL8DoA/8OLyLKHH6nN5Gjz/F9ehcwG0LjyfGSHiHGxWjBUHI9cjQRwG5WbEMoSb93r6gqCLmeFeLH

2yCaQ5eflYizfA+X6i5xCLRattOh4djdnxv9HtH2nH
XdvJj4flFRICUM4oHfxRep9WLjx0CG+pPzgoTaVp7lZyrvqUnqMxZ1AUodQBhY7/NGv7cxp1/NWXQzA/

3uskr2/IdxBRJmftu7aX9itpc8/j4lD5dj6iCCBXUaOP3/jKGW6MjdN2Z9HQ9nkr0wDn8hmtn/9oPyeR

yJ0p0jRYUmyNYyZFYu95ZLhgX+jzY8+btPn6deYUQB
biTm/PoyHlsfqrrkztjno2soXZBszdwdXaehXAAlSYwwOjYjuntF5qBla+mLGSB21BbAiUMi/eVlfExb

ZEIONrMqWz147y9KMrlkKNMNTUrLBj6HGJUL5s8zYhokKspxkPSPcEu7LPcChNBUBqbpU41mCe8A2L0J

tF7souFfFx6vOWl72Y8R/tRmTzVLr9kkuZq+j6pYMM
7rKmoq4WhZ9ZMEeUR/YrcXHsI+l5RcN3M9XOC8EyCl/1jnTU7T6hUOrPGjCmexFx1VkDqHcrxdPtvuhF

xvpt1wQ2hIgBBh8cRZIbnjfikZktM5SJXUA17w2XJBzgVqoG3G/jT3lCEriuXIQ+thRS+1XgfZE38oYE

IbP5Ak9Q9kLDhFvolKk87lMJohbS7TaIi3rDZgdrvt
ZxQxt7Xml3DgppCAGy/NqA8xkqprxuVEfrsmj9JF1Esc0IYwJPy0tBY6i/VfzBFDacWkZyAvRsLKcfzv

dUw9oTS81TxV45/ldksfM68b6+b2n6d8i0YYAo64VVkNUe8bPq8RCV9e5s7eqpoXqtQvhF09AGKddouv

/O618r9wpNLTeYcXNIlVG9ZBkIvQnppwkb9gyd2+vI
UMezWTRKO+SQ4ngElYE3jlEJN8Rq45Bem3SxQML7ckXKitnx+zHwSOMtnXvqIAKOSyKZ1Y5sgy9s6wzQ

eqB51N8rr2KtiOT5HUrxrHm9zXLPM3Et8A4K3zU+QHjLqUOiRy8XGfMwhiKxwuqDZy6Wp1HFj2M+QemU

Jodajitr6Hp0gPZ4LsH+HDbnGa3vKd09D0NGfjeia2
D/6Br/rRQ8Wlv4oScwJPr/Zbpfg+U5nzEmSQGBqHL/wXPlaguPux0wQ2PIzBSVsg2JKfy1tW5zSR51TI

FWl4i/lRiaiRmsQeZzzBJvhvf8Xk0uTCk84HrgFlM0rHGsyVzszEd+uQlGneapiLgIPpztKaizCYeq8X

uIHB4VREyOIQHaqG2qW0Gtew7llqoyv1YGpKn944oT
n9kQCZXfCTR9YBm5nbGTEIvNVZhl00RiNgpd9aOjKqf9ulN/hpdTxUSrH7i1mvCiPzaEVVN6DVB3s+YO

GOwYIShkiN5CNeHq65MxlLFLdwXTvRqEabZm/ZZbTMaHSa88yeBogFdFeYsyI2Ue7Yyk+WB4JozXBj9K

JYx99e8PmxONLgsfvCAhxvonuGE6xRqdYizVJcPZcm
zjRfyfYrdfkyqj0xrr4yBsMJewk4UKXuWnw0WgAW8GwWPcafo/R1IAICmbVwurcKSFol3yn8jPCqgKZK

6KaFBJ3FDZtCV7NlxUwteTgk0N50mtJ0n19802G5660sJmakyryIyl5IG1AAN8MgyfJowPZ4A+Wg4Qmc

gB60FjAHPnBUYIbSZcG95SwLsl5IAMNzsl5sWL+awy
kHe9AcrLZTqQg3Lyk9hw6HcJDQnQ0bfpfktgGXvcGqvE10d6RI5bmLEP5Cu2on707r4qJui9XLzcyssR

JMHbfSId0Kgr/JDhLCkVk4gbft5tABAluQYPRBnvuFb+28Ojbza/FzWVyKi6VCGczq9S3WT6OPi/A1xU

lHya7SJ76LlHGHno7SUeOoXQ+1zSO3ebh5FKENr8Cs
aBEc/RmaajfurOBWqOdVeK/hc55wIYPtLQbVgXDqBFVA9vCLvQl14tYFkFe9ABqfW6rIsHw5GOueDfaq

NaCOJoLDy7q+QDxmC9nFw/z1KfVjLqUrqu8oEhnVrQ06T1acc0605hZC23nLNHbi3wbEvKnrftk6HfVa

zUDxwzWIlHSJfg9zoVpV1wJxcaC3le/v43jeNfrje5
MHDt2YNGt+5DN1bRHXs2hcRZftu8mEf9crZHUbefzwizdG6l48uCyLxNciGObDfGoX3gWuxLAtLV/xHH

dOfKj3i07eRmndgrQUDzOoVLvJjUwhQRJCJUkyaebdJKlzgGeZIFj2U+fngXr+iSysmz5NiIXtBl6jhf

XwDNPZuLlI0DjQQV2I5k4LgGkmYQFMYkhfsKO6tC5h
tlk7KCXgjwhfDbz4tm9mfYN0oSYjsi/vmEbfznlxCvja2WJGwD6Npw8QoGKvapzxkA0bPn96IqUKc/0j

3MY3jEPnHEo5cPf9/L0QFK4yvs9B6esLQGtUChJ7Q4G7LiXVBQBjeFe77qvwois0eCO9fnmeigDpa3S2

y6plmtGw2klrbw3PAsqW3lszdEn9/wh/dfYsRQbo6Y
Wea884gtIFxYojf4BXe/VlsGrLrXO9v512EQUNJ4m/Gps3xqPC7EvtPT2pFD84CngW7O9dswFOeoyTfa

jfL0+4skNZwrB4LHx5ZQKtLBp973sOxL/fC1JFpe2+uL5tBg9XIiwjCuOPMffmUFPEpCyULmFx3Ga/0H

D021YlQhO7rPiZNpmiqDOk4gJPc5vhHAlu0OFGvXNe
dbIwIR1xfefGgN3M5N4DTrApIKRjtys5bfoMtl+nm4hh9A1ltTwiihNPNF/PVwLyxg9xqHbnRHAvZQm9

yd/Uv+H1CUV1Jv87DIwb5UnwwdUcPY/8np6v+yROBirGuplSY0O5uzbeldE5DwNoBz4D+57iky9uKgWG

QdtG1CCO8VFolFkk/0c2d75e1DwztHUonU6+FdZx6c
7+O7iKJKtXIUq2pddu/fFo0Ylec2uWIlnmaQRCL+L/4v/r+SxKm2VPrhkHFALDGFs50e24s1Vj+/aneP

I7/A1TdZ6E/rbXYG6zY5+27y2Po2C43/xSuet1DG+CH06I/CyubEX2L9wPG7zOTxp+IZJWbi66d6P9kV

76CN554tiljrZTpnsr6v8fImaP4PO2AWAJQAKDu/cU
rDDCHHafyqQa9/rpGzWk6zW4Yqd16POX7oxnTjeWFaljLWWSwiLwLkhoEHoaPi9ayYzwv1NzoXDMnPWx

xa8iaa3/vJSO+40dohI1lNz8k9pRgsnPkVSnTp6jLwvn84U9xxFUnprOfU9ieLpnIOBJia0pwKz7GPQ7

y2jrUplp1T9hZR0v9M7qrnQR6w3Rwa4lyL2Sr/qUTk
IfWSly+jXlyu2bDx1G6YI16v8p8UaXg5A7UtAsm5XKudBS+z7xojh8U4XSNi0VS77zC5YuCDBgSZ9Tat

HOmi4uMISlSWiNZp0JjSv2IvSgj3rUsmb23jDQmqLKUhzuSsD+S8W027+DAAaRLX4e89upiEWUMgPf82

Z/oivddLvgc5NtocoIf4FIcfnqNO0jo99vldz5378V
b5gEcpeOXSM3BLFUTSpBSGK2frAKyX3jlSEdTPP1qSV9lQK+jgtVfIVE6QWkRm9nYcfPOzTLs9FVl7ki

5OYfF8tXYphurcusUnbZIJPK2mp3+GBX5YClrns0JfyaeT9tyPJR2hdslaJKULi+WjyQxQ9+7juCEWyu

OSn+o1UawzCbOn4YG9G0GlFhXpY6dWLgx7ddPIKyvw
65n+QG8gecXQouCN5UOtcuVtFaJ0CVxieNpjrpiwsqoAkdONIqaEhmhXmCdjooot/rBJXcKVVnvEPlyn

bCcMKjq1U52mdjt9cHqwzAuifAFozC/8U2yK95xIrAeORzKLDththgaRfGoag9+MS1fEPsNOwcK4+cvo

n3xPL/KRbW9SJqOxzeoRu0iAdZoVCvLhAMn7w2e4lY
MD5DHd0JzgkPFzc+WbMmwXnL2pnzVQg4hDtkQAzBpak7xRuB7x0k+u4PRVtGb2+KhHhuVh6CH5mLAQ+h

ZMu9Hj3l0YjjL1Wlat5biHBA4pQd8ZYq+b5Gooi0NkovsPMSw+GqOa+LVZ87i/MuTdCJIEzO/zOJs8yM

Tidb+96lqyPLcqXbq19hAqFj1KszZoPmU5MW07Er2Z
61tJFKwrn+pzF/NM2rZl+9MjonF/z9n3yH0RtMhOkwa8Z1m53Gf3Xyev5K2sj93TswXh7gjvS9OOgijk

0NTxSXV+Y/At9bIFP1uhL35sxCUjygXkz/rbIyQTaXmtGPFSOpSqPXD3tdn/di204lVdBXTRjbz4RfIj

hP9ZWks4IzqmQPggZImUWQ8q/tDkSBNojzrkxPWI+d
ILSLhKMTEeJoz57TOxt80QN7k4YGRJ22jXLPxQH6akU7Py7s5fDOkmqV6t7t0v/MN/RrlKf6pxnhJb27

474NYWdFzIo/GmVviRHS1Tbhobn+ibFygqicqxH94bDpfVZzUCF3IDIwM9Og+ujVkbNdaTehzZtM5j4f

3M+/dKsoG8PEviWmRlnrMk9hm/DM5ZOqOtaI796i2I
ET0YmGneh8YgESSPIig10fQOl61gtQRVDWcLpxyXeAJsa2ke84t5nJSwbexoCzrSVW+FWP5DMl9Ow5qR

xLqn4Zw9n5XOFYVJ6f7aH716SpkWVBYeuBiubPHRmQ8g44+3B58tjYWlXpwbYEEUymw+U29gC3FHLnLf

2YoK/j2zjkxDol9X7VjE5DuFThTfgJzhTRdpmO10Ro
Q1B10RxuUK0GmuBbdaKELTYU6/aUC8jaUFeFzD2/mO8FYDdsI9xP3BNM/xaeeYA62dYKpEOeSiIu/ipd

D8w2spGj8z7w0MDzo3Tm/iNrRZ7O8PHvaK5UsB90b53hr8dcH9q1bw5cTxexoenEC6A5j7W5y67jWE6h

WpatfCibONxQQheMv2xO1VjZZUWJNuvsYf/keuZwDO
Ov8aUCjEG+Uo+T0pBOuSUUkMRJ86kdtwblb/6kxAwNPnztcDCbfDYarm+Eq1LneQ+iFgmzbcKqmeG0fn

AowWZdzVaAtcoTZSjGTWGoDm+8GKZAoxfbwS8EL6CRu8iD9m/IlBCfj23YuHYd7V/oknVtjCnjbXujKC

7Rday9a6IpgSS7t3yUcpbHB8E39jSZjPpKtuRvocQ+
Y6dqGNFpDU2e/BeIwmZFwrvCaTrBKzNazs39s5wZE7bVFNhTbyfNGtddioFgot64m74IyyXnSsqTGMhO

6WNwE6gjVabfjlWccH4j9yAu2HQ+HrdiAtFjlaY0oL6HiIClNX2zWsEA+Zf4IjytGG+GSC8aR1YUgjHm

sXMxBCtfaaGO2SWcb20pV/xzFpsEPRD3lhKKqThceL
GqdhODjC6Jj4LYb7N3EadiyJRP1EbykMLSfHskaxX3byq3s6rFAf1BbZxKU0FwHzKZjBUGFUwJL270L7

b8Ir59C+NuE0QVcGEXqdrB8bX+4vHwEyawYhQPlMU9Wrrk9VN6LhTddW/WUfuSCqoG1CUFc3P+MFLguZ

ntlduv7WaCFUo4z8rLa1LqArlOwhjld6xTYBQiFBC1
Ds7MvKmih28uFdSjKCSpQH9x/P0XRiHa5eduInvaAx0nQq365LNVYKXdPARlbFTqS51Qc1ufbPSbReqK

k7Fvawk6RZUn8D4VxX+vZgsuWwdrKTF5rJb6g7Xep1o/mHJLU6GNn5y/rRh/k28cHXM6NNTRfcVcrMq8

60+vnMluS9AB1X23WWaN0LFavvvSVRa6vrbDo8JNdC
f7CbCzoRvXZcS4OIJ87scG3edxJJlV7oak8RUwfxVvBCqdV5m4oj/0j3qfAA3fDm81JADuyfTi4JwGbQ

/3YQd9iOs77drwDKG5ZwKm1gB19R90UR7UgJsW/reJYyyyVkz09/BS7Ydgcb18t6ZbUxNq+f9cOJxjPN

3wiXLW8llw9raZMb6534H/gSRr7dp6SCy6b3NgdErE
aUU353iwl8zkddvRUR1eo4aFSU96xSikMaIo5F0kqeVU6aOUXrIuWYhHG70bf3EaDX0//VGbg+MVydR0

jB8xNgx8w/wvQMMMI109RZXNww6ScFDm9Ms2pM5AQZgVOWVvpEP3E8O6j0V4tGGrkN5L8haFAnXnWlgy

MmBj1L7YR6nCNELO4YGqZhIx5N9u2DVVTacMXMVeKE
FwhSC9hE+4R/gmTMY7Ucvr63IYIMViSn3fqfpVtK6uNdMSCI8BVcOJrHKeKS9KILQz0Lommvn8qee2hY

t435I2/ziSskWG9mETGPHw+C6aW5NV7GKx6OeKQS0klmafEDPS8PJxHO/4eODh4woB64m2Uy9Wa3fSof

px7UfCfQfJFl+LMJlpXPZdgCp5ULF5YQreT1OgTvfG
sNaP9JBSokGuMUG50Q+y2Rhoa9AINpKhjM8In9uxMn/4bxrFyggQVRii5/6byrAuIHV5lwBIcGBafcbJ

/5NGhE066EN94C5icyZ5uECWU6BQrNGAou3KAO/GDu3TRYvXJzxq44L17DeLw3iRx+59unRR/a8TwW20

XLATeIhreEgYDP0pkS1xXR6doOQNRKplTMN7c4RJX0
TKbgCXAwim8jmXrSa4xEAv7dxjgUq9tk2S7YLtNKqfNJr1OkOxXsMQ8zII50mbXYAYiQ6/Tioy77pNn4

Nw26VKy3pKIpKQ1dZt8SNUhL7viBCO4A9Ym6SzVG/m3L6eomCEVdsG86Zs8ndYgad0MjE/Gj5Rdsqx0H

GFK0a3a+DIeq9Dbd4wB9RvC+lUBR3FqM6hqR3hGQgq
+evEtrGkRlX4eQxHRRXxigSRU6qBvZe+J3Ogs4xbdpxEBxe8lYpPt328qImYhbD7Tcjzv2pVt4McUZZv

4jXJoebFrvjNC6IQQKzDMDnZYXQobz7F0T18Y6Ot4gm+3dieGfbMY14dER1/mLbggGeTvtESvyUZhMV2

kzsnSsqrE4nLF/vJi1gM1/4MlVh1fr08dhFBZ4VRCS
FV0I7e/PedYSEMRiWt/x/ZGZRNHDuoOEogB4+HR89k2WU0j1p9JHfdsB1zX6zM32WeAJXE8yVtJMFxp8

L5247zlPGx/5uQN/pXWrVmju9d1dw7WGLa1/PyW3det1oqri/+PBbmk+2eaqKHWImvOCze88iz0wZiJy

KW5qKKMNH7dDenrdpccaUVzaoMAMQU/fSW09ohjRJl
1GgG3l5pslXBaf9BWkvjEf1lbrfpEgr+I3vVMaxb2BqQeGbIg7fyBNBR8F3eic46dJW/acCo58vTF66L

kxUdlfCPTFYbqRYlaX8EQPKOn29hWAk9tc36AmOE0QDmtvOpWU3VwftyCq1RrSJsPU43EShWKzVhU7T9

ubBPUHDZ+Jv+Ea89oN1ZPKpqnUfIuKP06H8xj2uceU
LIMxZKD/wWnOxjEfiBNR0far/sMDEv8+2NYvdq9cQfv22qfStraI/0bSW9b2ibAOH+tWsoog2r4KpjQA

HYu+6I+VCs81cL3fvtZR465+SfTXdTlri+i35tLjISGuz34GX6iWTe2veg00wbqzQkeFdNx20Xk5p9YB

YXZFvCsDeGc62Dy19hdC2wkUrB7FwpVNmoPybLeMNd
uKMu1kL1WQa1v4TyOkzucBrbLRRr75vE1d6+5TSFawn7JJFr2QRIK2tdTbift0KsESMcAGJ/3tNWobil

adY5s9PrXww/1Vq4/BnqxA1o/iPGihRBjQy6uORrVn+csKpK4eL5ocU0/GfeFf1512Wfn+blPYM9ReHf

GJWmJUUxSDQjaciMZMsvD0rp5FjEXzrGoU0Gq2uIoh
YWwViGAgU2I0KJnXV+KyFDiG2S1BHFAU4KMt0sztsz15LfZIuosgSav1YQYiNvxtshwp+hwJLHCfmm1d

sXfjJbdS2stu3m/Gs9VYtjVPw42/bhv9tvL190Ld9k6Pbxx2erbbAst0iYdp+lfLYP7SBhwCjGp6U5fs

eBDi01Vjqm22fQ6YhvtScAFLYQCj/MKGrnREPEjg10
DZy/v3JbfK7ynH6CHzZEKYqFUO+ixdohU4spE/fu52jwRmlXPemYEaCjGq2MhB4P7nd/8YVGkTFd1SFQ

rLRGU4NUYa6siMWnkDa5z0bQDm8hzAuxwnVNaBiE7Hkh+laUUtHlqFN0JMeSojHU9e6Ft+wqjEbx4GQn

MaFOMaPFfR8Bzu9LgcpCQi1PDRhmxjlqorUo6xaOHO
TrxkZNyFe6NeNZzp+LUykqjTHzMh9/auryxiPVqMwxu3pUoCNkKvfXpidB5ybQgZGXS4TrdB2Pk//6H+

W8DwnR29cV13Re5PQXX7WBnXyeTd7RPZxVcyGSoMo5phw4PSAh1RWu0msYjqMUNIyO0DyOIVrcEnn1jt

qEgqIrp31RmiNsr26Frh5gczg6cf3njq2uY/wTwNY4
AoTjdA0rzr+e6qkijowVpmMdG0Vyzrm6LZyGh1t1UORhdfo0kW0P7FY1IQINqK0OTu92uUtnbjudlWKE

CeqLp0UtYsn2wbSyJOmSlyhuC6MCxQNaJerA9B1QOosSn0Q2wTHal8LGu/gGfDvoDSxZLK2kFW8zIIRM

Xj4C6nJETs4288Fc+8pf525WeFqfYAturDsi3Mxi0P
dKXnkn1i5BlKwtLs339OCqPrUdvcS4sKleGHTT47iS8ZOoSuoTgOJ+oP035GH0VbruHkKHEocwNEikoY

i18YFUIrv/Ujcd3ue1d4kb2M6v6KVHgGfQzqq3da/3dDaQ2m8idC233OsQ0Gxa/zl9JyYh7uFSfpej2c

z1l1EOfRkA5yvmOpdeQU9c3GOgUPkwkRKpfCvtR3Cx
+iLbJs6tEcvjK9nw/lNspWOTHUEWj0UYZJTBr1BDNazIU6enY5zHINjnlwIqZYaplZIlTN5Q2kWTL2+r

hM4pUInPK93+jyDnHf0hfH7Ii0Lxjmi/O73ul40qrmMB65e2bCHfY1rGxp/qewt0m6j58d2xX4BS5R+5

I7NMZ23Vs3Ssjoqnhy7iSlscOlii0Yf41rV7Ocg2hJ
ZDQ4D28iPjTD5avuIKfZj3s6QK5N7GddGjcs0wxSLxbRP9tv3Mg50QHv87xuKEWrH2ucw8lwQbCgQ0pf

nNRx4Azr05liMDdeOrOJrfR7hpmNrwXjYYYLblzi7uRZmtG5wxwEmlQlFRm2NdhWe/El3rHQ7kzhmvjF

Ubev3LCNCA6+sMI1bFRjCoMyyDIm29do/wcdlkGY83
i4ivHpzrKBz5/Qh71oeQS+rBPwfZHj5VjCrNW6QNnmbgZGx85tiJBIvnlWavXOEq9CY5ZBWr6D1CKYBE

8Qyp+f7mF+DIzJ7Tg01yXuALb//sv7J/qTif3BRUACj0F4ds1GSAJmFvpFI4y/krD1PX+8V9j9aqyws0

nSpEs11p+VauGKceIv4jVAvscwheeVKhcXTJaiO+JV
b5wdN3E2Hrg6Cw1wKqv54nA+Gfo8Q/HDaPKXvJwWrvzgVGL/MgseJeYOUwtgh0q2XuLay7WGL9+IPULF

xH/5Hi51NrJQCf+QybFOqslsfec6bqG7gIrfm1ICcPxLp55Xknz5CS2gfH23avPUApE7INqlAzN4Ewh5

C13pGkC1TpwEGktlY9ZPKFXg3deDFsLbOszCU6DZ0j
W+3I3jBU/VljnUouaLOWo5V1kBB1a973yv5eoy6DLkX0a0ew5MsfDmx1HvjGjgmUz7G2ht354H3YDkdV

tc5lKo1DlHp+kM158QXMJMzblIYc/qaf4P9Btzohgkdyp/WbVmpUASv3yDp6LgGjJhNGb3GowTMYnvSx

3Z15OwfniEDd1IhyDuSOK+hgvwlUEaI3a6gMaKRFpo
WbmmM2w5/fmXXad9waY2LzN2f6dwg2jzkz3/x7PXOI6L+wCP81n6fgb/oZjhYkfsZd+aMkf6vbJAfm0z

fO+HZe9LUBtvHGPHfgfD+9SMnWf2wNOOmp1pGhWahwWUCsdBRqgEgT+/rsyUbIMJpVKAK6q5jh0CYn+f

3EHkGNdV1Aa6LWzNgLE5kXwBIXY63YpGy5+862mmeF
PmVbclzag7x7C+2m4qrxJuf9xuPJczhrFFx0fYCv6Nf5kw62Oz2r49A3m7WBKAQ3oUcBbfptNmA4jcXm

6l5dgGDPmvaerRoX1Lg2zsaUHFPCssnnL/ec8IexyBAkhJAGSVQmLzz6gQ6h1cI482GcHwP4IDEBnWyy

nQP98FDMrD+/MaWt+Xgi1g/SR6o/fq8Go9D0nzxKnk
i/QPMudbb/O+ZCE42zrf2w8RwhRs3307ThkQHVC99ZlNa1+CS2r3czB5u3TEqFFhCI1EIPr5J3Yp4S0X

efmJLMKj/9AUKEjEfyAvnRA61nRVu790YSk6ahcb+VO4LWdvUwnmIKgRUUe73WCT+lUlzUSHdg8zSRni

5K/HhAflSDfR/PUR22EybwCks4PDHSb0yj1qHFxYTK
sNvY66A3iwkYI+llguru12OnoqrXweux+ZG+rwn4+wTb9Q7XKfeHa68UcT8THCb7M6B73NFdaknf93z3

1ysisU/TTcIPwGsZ3rrt717fTbSEmR1I1i4xOXliRURg2hJlaY5wzZYzn1wtVCBbbziJI4ndIqtZm988

PX0hxtNLWoCCbRba6SzglmxqfHjGPjyp7apGt/Y7Ip
IOwYCgr7fSAg1owuTW8Iq0uWfxR4taQh0kAJAKCbR2MNFA/Jmi1C6acZTPPVrmD7qaCd7YbpR/C9lv1Y

lqknAiJ82ixDekvkJLexWlYjd/ddg2yReHuIyxaETN+Ii2ZegWw0sCWBFoutwHqXpBAniRr8VDlgz25m

6JcJMDk2odKrdkgCOi0D4tQfAfwTcJUipHhzudB4qw
zB70FKujrtwzbE8DJ4EL3TgG8MUz+lOdDPDjnw4EnZSuMrUF/6GEu5T0mmRtzaGJu+qU12BzxhaAK5hp

27/0SKiVmZ8dAntoptgr3yD4/PHDE+IAxVa635CL2elHWU7MZaXYsz9ycUS9uC3sYLiGp70RtZKRoBRy

TItBN3wTcqEUwVriFCYzhDmdNQoAnXHe4bga5gkqsJ
SY2kmq8VkHB7cWurP55kutLfakkzHQN1buvGvocdK2r6NRWFoaOL0FE+CEpir8mb3Wi8fNL6snqvhJ6+

peu9xF+BNoevwYO5sEjw8maoxeFmdTcwzvWTnrNtQapYaELtu6p8SvdZ0sL48sGw4PXaZaS4pXvXg1ql

tL26oqnl/S73IEmbaMC3rnN1galCo/1WOLo9qiS4bl
F/lsOC4xbR8U9jXdxjZzs04M6WbA11CTifJJa9oeYoEB/fAVRg/uY0Eu9b3Ig/Uh4Uyp5BPiZ9PNlKaN

ST3Ae1VWlc4nDrI73Qenw08uKPBT1pcXTJxUQzGaYkCiu4pDa6nLSh+60C2FnGaAqYLY9Y5EPi0Ky7dy

/mIvpFaPuceAxzd7bvTOjI7MnCm7UeHSOyDCNZX4jD
rY6BC3oPtTsis1ZhPnVPwqgnhnT03WI+vkwqa34eboWlG0G5Ka6Ty6+la3JQfJKArobs/wH/Lv602OaW

TLmrU3ur3jgw5bhw9MgoKfy/zgf7dM64P+hXg9bmzLwOfibLyGZhSTo+L8qN4dFSaDUFtPja6QsTPRxd

KUYCpKUGSq4CfvF3cGKoTDX4nFPghvxj96NHKqP+NZ
pTs7I6nVgx8OeP/YeNLOSY8q2JiUwmZ532GHoNxodOe63joymFz+weMFTa+At+mvr5d5YSS02wh5sx+4

6YUOMtgt1W0UBbTTLvVZLuRr4R812PEd2q1Hwup+UtaQdwfrelXd/SZbIKrWf4IVbXw8wNNvZkr3emWw

EsitTB3SGsB1jfvlY5VGhNXC+hDrfBuheFvYB24956
s6N1YdnIdFZWpxsqGLKJjGJ20v3AA/nnryO08VxnEMNnpaRuoFoywHXnuDatXe80wyXUkR0jN9zJEPpW

kQ1hC5+WWzOO+aFLYAGjUYSg0PloaaLy5ZBCqHjb6y+GB/pqGsEJWpz3YKClCoviMvfJA2oqJqEmAs4c

ntmvQAG4SlakyV9XLz/uh1AuNE+5aUZqzjGE/OLEVC
/iLgXXNX5ko6Ocmq8xEQ9FZus9gWsBnfP6jgu02FKYdCC40k72Ng25dCqj3/sQSivNcG0qXP7oNs2+TU

HWUOwsUeomGWiqjt7ToddJa4vTSPcPyoXCBGT0gkmj69YrJtGHsMhOuSrftETfHGnOMcMoWfw0FDcSh5

UzGnmvm9mvEhynuj0S/Jt3MV6oEg0v2c+0kIBM2VoS
aVE1NddqUbHnp4B40+guaRHUmdpRK/xFAN/j2ptB8qTYfu34+abAaTURFPs08CPG37+IlY1maHZz8T4t

+hm3ENxn88Uz/p2F7VnTOVei12u8Tg9VmGJ3PRiGp9NAQr7dNH5V255G15H14FER9kuR2yadX6GWQNdJ

y8ShlYJl3VrDWlgvFYeE+MYJIccxf7zrtYajcKgCMb
TsasN/OSO91lGQZbHeLC0sLg9OQpI+7n7bOcPZeG/Su3CFs1wCoYe35ISUHykZqob89n5v1do8RbHFpB

m8ZIOkqOeDLExOEH+/DK/RNZOm5otxQFIuAR05cbHCR7k4fHE6vOpWWp/ErfiT7GoaxZYp0srh7/9Fpm

sdOvYNbzo8MygI7iLw2TukgivrBhv8aEAHit9oGEaj
RXDiS76NIKcIit/elnKWWO1UBw5sikeVymr6wf6RXaXt+6Kp1WPeX804nqd8b8YczKEFvlMzafcHhkow

hlVhzncJ2QEvKfdXc8duWwG9COPl9o36QgyqerQRDAvOCPsI1wdhQMXA9KUJk90KT0bpGfCKCUVtjpi/

tjHoxa9pr2rVW+jz+L4vKoqLZyn8TLxErb/i6fujzh
gU1ppPD6hcmt4DT/f+S34wRnOZ9c6k6/oLQ27/5zBN+CQ4P3XRg2d1/f7ABEohwDpj9sYVXLf0zL6jbk

jIAT2GJApq4vsCboBO+ETV7yy6Vp1KZkeFegl35WlqlqefNjii3bT9fuqi24l2gzisErVpWZDg44Vgvb

UhI3hxJi/Zcv+oi0E2UJyrcM1I01t4fBN6/K2Jjsno
EdtqnoS+uRvy6NDqKccB93xoKWBNyj1wDxZdZDPjDwE0b+G/ry9mi8dG3aqipdrGd41SaxEhJwglu0dV

SP4GpfwqFbtFrRy51JmzmsaApheVRaUhsMOyttPdaagxApt04Im4wIgfvkKH9QSo/1GNCUwVYT4Tif/n

6NaSbmxberwNfIn2UL5mSgAFqT3ZXR2uGjwlVM4mOF
hU26bC5XjT4yAqvIQ2L6XwwSLscpBSvaXhrHa46SvAdzU4fiXf+IRmz1pFxI0lwHt7HSsU0+xGVRyXNG

gx/x0RyciNjBZTRBmnSdmIg8nusCjN4ARez9QbBh1gwf3xbTG/zJ0tfK5vQyZLSfcic7JGmRrgNPTFrY

bq9+pAUmPcF00T7TBWr9GURmQsVwx/2oiy9zer9gLl
XOZU17trC9hRQI7DhZRYt9XQDL2PEMJR5a+RFSPEcYN0qN/QcIk5aQ2VS8JB5gqRij3FYPB4A2Ep4/Q2

rF54qe+3x++CZBjvzOJWOSWb6LlShAID3ZGRo2pOJffX0G53UpROfExqK3ZsYW4XChIloSZxk5BsoLJC

M59tCVrwDKQuLMeMr9H60zKYN/PuaMi3/GB68eQ2RA
/licKEMQutkV9YmIk/KMpF+AGeUD+6Lu6m5kF8uAyDU9mvTkq2CRdwH1CgOB9QHBUmA+KBY8SSaJmIQ4

PFcyNu+wXPQvWP45h+XNpv8igPQDnLzgzO3urubf87B303v4AP40w6GXExmqLM0F3Cy7rs+3h35iE9pT

8Of1zcenU4fwtu5uDoDxDpOYe5jGuxC/ke18veajhl
gO4srQDyDApU5maUaQNLv27g/SB76dDdIsAgTDgxJhQ4Lk89mgpxKLA9hcqnsB5+RUGTmxAdfjAT733T

zZEXjc520nP8V9t3UqptjS9fb6caZRiZ88pbLAVl0w3j/JalakSLJpQcMIeIUiMBNPQCG3+gG2ROJ/9j

TpW5EgfYhAKyWrPzMV6wDPZcs5y1giNC2d2ywaijyn
qCUN3ig59SfRob4nOgCZJKZZXNIVfyV2n4FuIniTVMrMdZ/IUpk8YTmASaZT/3OYqNwL8KqOamLqKV+4

rZwCSOsQ56Ik+HbdtyohxD20QMUv2Xph/lxW6bIGis6zao083im8gBwb+tGSJC4PGGzjsx0VI6h7E3zn

vchonsckAWa5cwG6zGe6zwlxFzFqkKrQMJIhyLVA6N
KwrJKgaeiubQSPpTMnRl9vd/ax/cvZz2DaUeJipUwIsKXrv4ZQjs5KWKzq201anFSDfKibSEHQf+sl9b

7scj7YEQAAcZXGg6KuogUmjTnxgVNl4tXUGbMsr5kU/ww2qZj37Qbk1DldqIf+Rh3vJG5Bdu4XGG8K+D

8kadEuU54Qr6FHQIiX1QsNRY07QpPiyDPx9u5J/0nl
HzqCqq9qKiXP6wB9MM0X4cndkuOHEtkDk+0F7z2TT1CR//kX3/ZkCHknPfn1mziYsVn2IvFOLoWQXo1w

bGD64UitoxExUZIGAUgRsFBRB93f4Q8pne2VGXfMJnMs7i+egJATQLk0mSRfG0T4fSbCMZujiruCi0Hk

Zn/+L2eho5XbQY7Zr9LexIl3r9kHy7K+Tms7rqCPd5
iM2YwZGoHU/OPHHH5pm6sWQixRnyuzzCXvw45IGDsI6vY/z1wpREy1CqzW9wD13sdcT+5d50p3Leo2fI

3VEQSc4pW+c+wCyJaCLAuVpqbvmzrOILsqznLOCK9gDQ5mPW0f13+ZP0Py8YMlCC3JFlD3MfX+NXcD27

xqs9xkHZ/w7B2EhN44KK9ncHjySkp0xbabLqI1ayTx
xv4kLXOLtndhkscM3A7JgvNgMjqsBjhgbzGyieGXW0YW0AKJcnU7AAoeWgttaD9cdVDByVeL29iJ1tSa

dC6fKWbY4Gf7TBOQo/ShKfu1g2KZJCHnTOVlPakUR36+lLc5WGbBknzJtJvQ3Gsrnzo8d04JeWzCqczI

Ojo18f54+A7V68MY/L9VTUiESTSpLP41DURNLP4cHw
S2FSr5Tuj/iBolHPBF4ujgGoUzoBg/IYjKGGnD8NpMAv3vRHWeZ+jtaaAxFlQkXF9vADGt1Mo3+OzYpy

noKxaCcbxuz9knlvpe4bnmnXz4nLbT51A2ZiVYtYT3ypqgX4UrRPFv632tJE83DztBs8xLz89HbWHcCJ

bMFV8zzC5ayz6ltonT0wQahEs3VSHeidmLP1mIVE0M
AcEB015kCPdqdn57RELLq6Pwu9XERPZ2nhKS17X7KSs7L80xWQs0OyvZXy3fSm+uaHYVpMCVbHYvpExk

40++Isyu7oI1A92olB0pGD+OTVTDJ46EvfxITjqNHU1wS0V4WwAvgNU99euZyFwMLsWubPvCZwXHsJwZ

ZUx1JqOIU4GpCwQG6vDuWy3ODRNWqDedSaTCPgYTzk
PYz/ToyMXdV+08FCec5+pVvSNa5bNQEuT7RnclINQtjEYa1lWng4JLuNnyZ7vZK63ygHcEFDxkpEjNMM

dS6tWT5G2EeO4ebOnh10kzkOfQ6HSiqXPyyrorUR3VTopdQPxxAppgcqDTeSGblBZ5Y5WUxUPA+9j7AB

lZDG5Ck4ZTg11rtuny8n1HfpvCCo052KCplgqAm1Mz
Tn4CNIvoTXNZWn7814JXdsiQUT54kb4uLSbzOHtlttwCglTWcEXy1CXgZqe7b/6yZ1thd9N3y0j1yn9E

hjYu3ff98yg3HpruSzSqyuHL4M9mWys1s8+octzUPYiTRJWrxgL5/zJ+bFBv3oRfA0zqVcCHOR9fmcgW

5R194oQsuSoPICNExNrX0gMZG+O7pxh4TOgjFds8dz
CF2TmqU1vDEWJUtJ2FISEPOIsPJUV424sc/NxnffNVIWVJb8nw55G3QILE25G8YxumnGJed2+LzZNaXV

pDvG+j6CNyxacz1ZuGBfhHGLrM+x7C1W7Cngp6QQkq4OZgHrMXmAZ8GXg6Sn6crwHTNjp7nNIwLIygAh

kSKYTVD8Rvg2nnyC36AXf/ZYXJgTHUvKM3h4RTUXlq
G55pszo4q8A1X8TadWPud3xvSnpiGscVXshwRiTlEgBt+qVwxQNrwo6nXSMPFCnU4O80X7TaUMNJZ/wx

ZcR8SqPBtx4LhCWGubdORSNvNdCUV7eH4xkgCATn+K5bEVWvP1jhPoOaA4PldegzLK+nuuAYa6xAmO3J

1AJCkL8koHY7e4eBk+MHgzr9UtmVtRCSt9FtVmUcPS
imYPuSMBZ4iMtbuJ+Mz/pBMoeif/OBLrurmVV2j1XCAg1DfxvDj0CEslZxevdGdjXK8Y4HPXMjFAsDzo

o0j1BfIQnXMEZBApS0fKs//i0R/GQRSAMMbcmb9ldU76rWKSaCt2811g+mCfnl8NLp6M+7Ao/nTn1Ugm

FaxdW94BuEv761n2T/bbaL/uVYmNALmPbSwCNDUe8V
u0McQFBcqkznuVygQQcurD/NYnhp7Pei3MokHPMh5Nm2fmWx8oTJl7b5OvIgiSKZB4YAdh3xqf0uqgqn

qvNZmu6vyyzy71PCTi6uVoEso2ZK96+x4RPY8iz9txk5NZXV5S+/UQ+sTsmP3MUiAPzlC8wHhr9x6lM+

MD+jmf7bdxa++ZBC/jy5SUqFHCEScQOBj/r7hLRp3A
vYbY+10T8nDF4u86J7HwdiJwHTi6bys1mFuLRUnBDYKIP89pkhfudRkDBA35UgDtMXbFaa7EV7E2M1RR

XsZ6dGA9jZ/F7gKtMZX/3qR7swIwQFjPx/tcQp94tvE05h431efM5Zcv9eELZsovdwIK5e+7w1oDHIXS

h7K9IHXfWBOFFBcZ/5gPYuhmw3DVgenr4GmkBd0DWg
yeb7oM4lTRL1eLxs+wxowSTYxiQ4//sCtX56W6/SI0/QF69kWu/sSx+j9MN0C5VSZuQq6xSzFe7sC2ox

LY6BqC27J7mNsyFIHx/3u0+B6zwPFTDGS1sw3QkF24uWKfjwPvxWEI4AwkA/Ah3KZK/ez05Ys5rzElcn

LTN6achhZS6lVMSus422PunVOQQB95FAdBvF1jFUCp
KbNV6YGB2sEU3P+F7c9dEgijMrCib9IGUcW1evnkcHzg0hVDwssWF0oERUy3ssZTxYRckTrxe45yKst2

5ahDYxzAVZtcRVdee2shL89+92chip4M9rwk53wcLmb1ofZTFzM1+Vwbd5v1duBec3AGtzgXXKjvI6Wl

ZSOzSOVgWZbj6eGx8ucixkVGih9y2x7q/1bogAYq6H
VH77WBJEryI5JH/K8pmnMfMtyQmW0Ji5alsFBlicfhbIiBqyU3FVivppQtlny1lgIl+/4y0NbdGbNccZ

HqYPvTp4eKk/M3Ysn62VllT/xcdMqL+5wtLGnR1ATwwtlNonMnjLvELJ5k9GC4f2pUgg8S4ul+4OxhII

CVpoXh3VdAOUyi6U4cAr8GhJC0Ig0Xde7tSO9KHlCy
q8nIakSJEjeM4ApSOxLx6S3ciJlihjCD2I9SkB1AVsuMoGxhV8+eNhL4MvlPleIdHO0z08LjBmgI/BKH

xEMLUbw9qVIXPKGAc3I02TziEh7lnIfKmzHC+9zfQN50Xyv6PmLtlkRnGIFS1rXsOqH9S9cfBpVc3vLY

01LxOgohfBu9c2wdTTxyKTK9xYRjWFpOcnzlgPzez4
KVDoQQCvvAuM9oKMLrXjPLN1u2Z0fjXTwVY75JR+ILmpHg5dk7Xg8ib1O3Cn5n8dqSOgtsllheL9bbJv

yfGJXpHOKELry70v3c6GporcgUqZcMCDu1Nq/fVbmAN3UVsXNj6cssMkZ3Sh7VpmHd8b1p/sXDUcWoI9

vputkhEY8A0pxUuyxQTuOX1L1WNcfGXQ4bawBVt7hy
l2VoQGaBhIj/vOfv3yTa+EzBYzUVz0l88xsGifQb1tQbSAIf3qDrHj8rjxUARNEfnO9ps4Jr5SYd8vGJ

POEy/kFMPU3wA9srVYx1FMLmX2va1IP1ivcgJAVIInrvyDrZoD/1u2Dm8JMTwaJhnqAI2UoOkJhfyOCR

3KLktj8vN4wy1zeBGDtRvLKE2+z/TlHrnd36w+3UUb
kdPNKLW48wE0RlUzh8D4+0FAKsgCcJQ2AosJA+hh7OSXwkrRS3FXbY7Ox8TMIiOYbRyAJEOvRxERHMmZ

1Xx7ydaj0cwAgrbN3mtyGl3ULNmi5aK80nlJnp56hCIhJrI2Q3tOErqyfQP8Vv7F/VZss2Px9ElAzjLD

CETQ9VaBYtqZ63QYqqElhYMYoAFTjjJykaHUpzv+H+
fSiPLOYrwurjHiiy9v75rGy0jonAk2vyRVY+02wNOWo+3Ve6NSvBGMJPCa1x3JwUqPpZzxMkKuTaUSpn

6tiVli8p1Jy45aDwRZSK0oKC5C0RwS+IMY03tUrcakxLXQJvQuRBpxfNksWFQh9/4cnqgsY2UwvZ+9af

jxNqluzEUx018f4+ORTmTG/htpXg9HHOmqO4Gzapy+
l2n3xPoCmJ6MCz1C2tsU94cj7pXBMFFhh0Yvu3mrrDjiNhYQwAlazJi3XSz8IbeBeU6eVKs4+v7BoM7G

cevO3rnDtm6VO/fgCiiKTaA4njfbFMj9Awj8bsisf/Mfj4iUf87/XeTrTStBS1pwMxzW5XTIqAlC9Heq

9HukrULhWGINzZ5Ba2ob0SzuEg7OOx0TJFA+QkVSxU
IHHCMaVKpKQPHH7VsyzRSSP3dhfuS5SNecqcoKNTu23HtjSUYZhH8uPU0G18vFTptb//JoUpTUU7+bM3

I0mk+dfVkhZUAmLVjOoUDPQ1H10hXDnKscp4iU2GQQwg4cnyZC4snwRH8ck5jVy4WPAUjGC03SelpxjB

gVI6DAX2fTVJZvq50sFvns6m1LqB8dZzwii4si9X9/
wBsBHn+RdOFUNsLsU/dy2rAvSGQJpIsryKH49j6mWOiphx8SwItrAIwYHWMMCpy+mrPil5kRgNtDF5cJ

I3VMaF4DtczjRIxzg17QG8Mnp+QiKU3gbWjtx0EFtfsTd9FUxXDVbiXqj0xWx3E6YPVkVqPlB4hbvZZU

D+7rvXNXSPQA0kIYqCNB7hgovNO32/w8iyiI3v4gJ7
+pAebMOp3ZytAG/JptChltuqid8vbt5v5cKve6Aevt+iKfMXeOmhMV7W0JtsNxxD2Kh3VVm9b93KeVbu

IkzvF4Y3RL9QynU3g5a2WGe/n6cPeiy7/e3ZcMDD+bVAUv0MVSdVQvPaJQPG37HU9FHJOQ9uCBApIBan

Q+u4nnqm257fnlxXhmWTHBR/0jjvbCXETigMtmydtn
35bk9A2uqTud2s6M1ILOWrjw+CsxfVz+9VJ9J9NGcK88owCxrXwSxJ/EpsfvgPtn4kOpaHIGXf8oOVBd

BDi/+3C4RFTTdbzvkz6IpwIaubLXjX/LE+SJVdUDPHLSmZ6hfkATAppFtesKOWWHo83UeHFB4Z7IKcwU

F/boQQro05eWpMT0KiSqavyPZryKE0b02K2sv8JRwV
7hnpXFnxXzlnZhe6RKyNoL87XH5ZcUd1LAhLNGV580BWBRl3U5mQ7r64T1UdFYoKKabYXDtuLBdijSI/

HuR2mvtlPUowy7fvVHMmPrhRobQ7UzwS0o2iQ697G18FFOlvghiFxidK58YAilMrWAmmrerEWjnHyfsx

7qZ1AYOd+wWAUo+qNpeBe741VWOdsnRmmMIkzU1+Aw
qAR9Rk0EXFBl2dsRuOzg0oSW3KDntCby/82PAmTtvjsxCy5O65Kba/Dy5dAI3Iw4rA4rLkKvRFhY8QsU

L+tlkmgzaRTWRHWnDSX9N3UlUvaTyNbIKfS+dfC0+nNekt9KALIlEj2trTvikmAgIpoxYLEzofNgb2NF

luWHLGsfv65A1c5RJpSMmb1GEl6Zbt6chaVsKNioOR
RdIo0r3DGoDyQhlmlb73JHlxsHMQl1n/qgWx2LNXi25P5SgXPr4YgNvHL/xqeW6F9XPrGBpL44Dj78Ia

DT2dBbkfx1fT5LA7nC6xcRm5lV2Ej/P2FSrZKIRarYG1k8lMoFXiCm9cOpG54bTVZAZ2lypnUnkL5XV3

VOIPYNfYoMDndaod0TIgXY2s9b6HWN4FuXsBqkAZrM
4Ivztv+II2JsQ67RJpyS9ORoUCiWdse99fbhVrj1yzYL/8kvm/glR2Z3mU1w6RV6WxBS3vTpQr9gyviw

/RfKGnZxVE9e88M3em4ze36rwD4W2twKBSvWyXTnHyEpWHztlU9G7Rsbw4Fs7Kovkp8KJleua8DbhqKQ

lqnIltOrBqQ//n/9Pit0h/BZVYKqRWBKs3AZNRyklr
BwDMkqcGCIMrLQAPzW4Chp+/GqQJEKq5uqu+4sx3CysRUwlGv4j7+QoumqsPRN6sUdvxHyjRwUnWdTvu

OUHOvuYjQpnTn+VkypIfsa4mg04X1YfNHJpQbDzt+SKPkmufQh5n5W/lj7qv/DyA1SE/5ZGcsVNw3T+5

onNq+AxYKh0ow/daNyFLyvvOk4pxQGtYc9VBoSL4Wv
OdZ/mH9R5sYYvdOx12HKGCNC8bJgn1rV+4rlfob0sbUu0pQKY4il3Kr97fjuc08VcNmP4TWZj1Usd41a

THdRvlleA5M+A2nBit+Iv4ZqGpvagNAiS2CG3rSes4GwnAkIvLaHhD6GEX2AEilQrO6zCKrkbKHr9etB

pDw4R3d40hPzFYHI6vF+7YWO6aedn4oJ3NpPQE7gkb
AQvFSZ86m4UTL+B3CksLE8iV5trNjYmb0iaFyT4zcO6aCzI21kZimfI7VQ6/mAb5trAbIvApH6jZ29WX

fNiY4BR0wg8Zp5LIWcQFSfF00ZpKZy09x5iBn9mKW6xYQf+XdjpSDJBrS26jHHgqATovrVZj0oShyqOi

kbwsfYC7Gyu1+ldFgFFtmBTfeStYBPxk/DjmdAJgCq
7myDKWgQzjOM2VQvePJct6HSEFFohv/9LUNaV4UQWE8/Kttqb36BVErf8hYJ8gqtypNbux6tui0VRNzU

sp7vL7RdAaN4ytQRNG+rvuRTeiFZk56xTf+FZQ36U3PlXifTllu0eQGbl78VPyM4iAgcYFKNzDezA8cm

mNaVNt3F1NdrhjW8xeieLtmRNzFinEf6zKp8lArEJl
f06MrR+u7vfF2S7wwKknp0qc8q9MWR5BGJrgUdcugJW5vkpxcdjG1986TEbn2rR/jy1QzUAZSgXrDfLO

djo1IFfzpd0inOny8kBX6QBFlzNKh2b5t6Ch+rJ6i+o/yTOx9JOdk/cKdci/t5aAZ6ylE43VhsFmWayR

+0pztdsfH7zBnaf/AvxLA9KyQUck35Ih4P4DU99qpW
M9jrwVyo/zm759/NdyrMOeOUldWqtnIGK+R7aCX3gnh35nlCeZgQzWHGSumRHVEN7P1XYRBCaWwsLLu3

LfkBZkmO+nbJqnCIIpJe1Zk2rSnwoxJODQXcJMe8mbpoHAEHGl+tdmE4dgEL2nOQlE4QRmx1Sb4Nm7IT

PrlXMDLShoXhU0581A1mBIN4uEdi7YyEtorMGDLBZG
GZ/iHubNoAgjQcelb28cSFRVFADME3XmUklZ0H37iYvjOxty6tIcStUh0B0CxOGMJ1pHXdjB9oDr3hJo

owIgtb4PSqvtql33nfBhXTPGWM4ByjYP+a1U0sjBlonbNvucGys1hT3xOpQFx4j9uqZd1byfnIQEL4kE

YceIQzRjTF9uAbWfFlRTEmD7KQXZ7wxJfxDuHoovzd
5zgjnO67GmNGyyvu9+K9ja/FmT/grKqp4J/B0DNZbvBtmEBUGkqncInQyJbPBnyWxfM0cZfVAbcUkBXk

Xx96royBclp/tHDHvG9uUdxk6oqo2HKy0KapM6zWysHfkGRxa/ezPo/vJs/wF0IogUmIt641JKz9Z2rD

9/CpXvWjNwP2u82uI71hEo7vM1+KeBFs32UBSVid2b
mVu7q5JH7JSxC+LfCWCtI019OV5EH7VarHNHX29aEC9ihD+dsHLWk8zd9+ezWUQbofcmopz8lbiMuMLi

EaJe33x1JnzS/76DnLyFkQ7SGTEs+STJOnD492g4MtlWbOId1Tk71ajnETflt+kPy/dEwhWYvxCCwJVj

PFiTU8dU4Nfoa/Yk0UCXdVhSdo5nhqQ4OqOwjRIO0v
Cla4eGbm2w11QALc1a7wiP9qgxBGGlnli1PWQfzbc/+ZEZgpx4doKXVh4A452KMvoEtelxVcE1Z7Z4yS

+ePagvM8iLBYv7Y1vYO59a3/xL+FKt3apHt64IYYWArn4fjI9dKyDGCRzPVVGGILus1QoRfJCC6y5qz/

+Ddg203jHwjDBoo9FkrStlgGpEF+DiDLveF4/9Jtjo
HtMXGl1qyaNHJNK4gleR8UZRBZ/iYmfIl/FuhKGALSuSD3kgjkY6oqTob7kM5Ls1S3s3mSaomg20wWjM

dRQmNn//m835YdiWq2I+UHN63ORkg35VqJKn4bdnClT8jOgpyiLoA+KA/6hT6i2sBu9fVTXzgrtQhPsc

LDii2qHvpPZhRCr2klBrWw45nxoR2utESF+faFi3oZ
ooz2Bb7J6Vm2BoPCieSVszSVBhQ4tCgCTlgjV+bnwfxP5hrVelZjOdQapjfA1Mn6kG6E1UHoW9qBE6UF

LXz7oG//O1XTkHGUhc9BL6PLD6IGBrCC8PAewP8EH+7OgDhp8BMMnwvyzohuvTRxNUPSRbzatcON6j+7

50WByQJgrhwcsr3wvKgC2oU37jr5ua5anVIP5NvjO1
4FxdQ3091etC1Tde7QaFmIDO5KwLZ/RTObLh65MsZp56QWcZOlkb8e0JoBJZ/k4ZjiHllUBFd7OGN4c6

pjvRSzQsY8Xi6MBPNVMg5NqF4OAXWfmZcpA6YTCrO8H1TAUJrj2mmxfvvPO+iHbAYM0s7ydsfdxPAWCu

6jdeQD5b9BCqEU6svjb7xBjn5uFqsRkFgtx7RWgKaU
hH8lyOVTmOjklgkogyaML1JMfG8stL7r+fVm5vcHpwz3IbmVfllNHsYQ7tZ/DrWD5ih4bvc9Ho+XIZjC

3k64r50A00xiOORiNdkud6tawRuQb8xXiP9csoeYDjiv+PLU3Kk6/vJoqVecpTO4EskZIepWIZospuuw

KltjaJ8ojorkzYP4Q4wQYL8YQo8DSMceZrifgnsBuh
Z0ETmiLk9xQ6ecPMmuswf6Urho1lVk0TjRJTnThNFttdXYIEXSv37GzCEcV1vwW8qjZ1gDqPSfrPC6GS

DJ50AdIZptfwF1Pwlhu9S4GAaEMWddVnzb1DFC2N9TDDsR1L/j3h5j1cjxuYhDevmXkD86dmr0Q82/t2

LS4bibUX+9tqovviFLYkgTPPr8ycI6Cj//sX9cwR7L
UHIUCM65Hc/kxai6yYz6kZSPvGluvtnSp2TcDSGwX1q53jcuwXVUy4/PwtpsHJohkOQLJXl3EJ4BMdlj

rP6Pu6jhdRF6gT8m/usWHFsfsWsmTMhxxPlFAqVVwMkwL4yxIV/P5y9JYVYGRRBtTcWRJ4DIhxUfoBpr

A7PDspD5dADUiMIgxa+wm8m6g64AVvyGhRrAGZLuVf
TtEseieBh06rXwwThJVl4M/e16gE1HFjJ+jcZxImxwfmO9C4zpzEzP8YtbD2VmYIp9TvAzK1vJ8eboB9

jhwNUik9kuc2fLfyEV6KFKWbfdbjKLbjDGiWUSOq7g52mSP2lIekLN9npn12k5c/FKdvYjiOKyTh/HRZ

YlA/7rOIJxEBRefRVxIgskNtPziubCjsFcsShSfg0J
R38w7iwFh4u5Clz43V8/zRDECTyAjeAQhqrp+WzG71EbqUTSWIxyPtlOrJn3cJXlxK99CrbrY+K+BMg8

JG91hA4UJJqt4yMYuN7JfdPnRKrRVoxqwroaFqVEQ6AQSbCaotF4BYa5qMtxncE9kLP5DXeSVTle6+OB

1IwJ8/GiiDQVbmKx248t8++hH31AcPrpU+x6Y/t4yq
JIolXs+nqfmtIRaiA3xuzSlX4X6frQWnqkseazadeg+mQFS+EA/kUjb6tSyjH+cS+AW0TU3sGrBRsrZF

rGL7M5Vix4d4+5T1/sH41w9XxLIlRdKnKpvewFkvGCFUBPmEvedTmtJRLqdLWQSglInq4j3QHv806S5o

R/Lh+7zvH/A1fWY8542xqTZx50d++7a2l2Exe8ducX
qvd7jNXXq0DRDgksOjLc32ehNQzuL+bvB+6UDBLpUMiB9AufullKB+ke4Kxhf90t7f5A4k6UWVdg3bP/

TXnz9wwghwgS11l7gcdYj4mwicTCH/KJUJT53l5twukmacxrHtZiyInYdTO4ezh9q4TV6BMGCkCLTeyk

4OX1bLOrITo0XkzDEw44z5oQz7rwo4ZxKmabOhewKE
M5c1l9klz+vq0VJ3AJdeU/5DnBsd1tNh81zvNeC0Fphxf0wmqTx68Nwqd/cUXdde1hdSTEXRksSkkDe1

brzCrK5tOy35s6bXSkdd+tIMckyhde9mqrYsyfm00/yWXClD58nqVhYSsGhjm1NFtBaXaLSrtLnskG+r

V/vYNBiFD6eR6mDvVTirdrQUSStQvdz0/NcHpR63HF
jDgcK7JKpOjXJ9klKBWHCw7i5p2+1lo8ukaIbDZnXkAv1u/NMGcSDSF56FdIYmcIQuDfKHTUFkdvOW9x

enB3fxF1a2Jlih6E3GnqjCGi2BlR7hvGYHuINVElVjoJ67bVtbkHm9xDfNCiFeKh+ZZzRQyKUk8ldVsb

S6MjEWepXVCdVxA/UpKLhRNTolqQ70/EcVMeJedGqm
ZxuG3Vz4TX+RndomBKq2V2MmVbHyMyAenbyvd/LkHZ+nJftutO6NgmWYOGoCS8nJ/bcZRdB1eRBChkKF

2ynkJFIZgZIZkSdgCWB8uALZ4XSf3G4WV6Wik5+ZspL58EdOU4DxfvdXrZoq8OsgCv6bifWq2B5aBTbV

2J8vfa+4b4SFY0TjxZNxIPmPdGmN5PlAe1UKmXz0rb
h9j6oe43y7+WsidUDFEXO8qaq0ljfq2AM4sEcXCf18prxEmoZqduQ8D6mLv0SCV4BC/YMeWySBBvBD4V

0SCkVmE920j1tegjeN0LF9zzKul5ig+2g4MccGKONvJpmfYqX1ONJAHHG0Snxu9ADh5/POApdogNJVNH

dq3HYayHuI5lgvSHqDA9JEJANbOEMzcolHP6SG8e/P
f+PkqtyrzV6BqrNdutQ1y2NcP+7bDMwkZqCl9alIHwfudbe4rMA4hP7TwKn4sViuqI63f136hjiXNbsx

1D7aE0SKnvxtlyyM1THW1DFOkSarnotrbzSHBYobMINa9/3Rygd83M3qEW2/3vKN6qenAL3n1/23c+F9

FVkJqvBNXaFGqEskLmO2SvAVGzZOFk4+PnTBSxdzcg
G2g0Z4/HfxTT8yYftb2n2j81wjVSQXzMgeUxUptCl0m00s6617zMpRb01ihOi4m2dGZ0iVTueG+BOarN

QYv/5p4su6gw6YIN5vLnHqYoIocc33k3NUn/QcAJIyCFnwPlxM3FjKozoJijNAxhebBTbQWYkSFxXycs

0cpdscWFT70LVHDGqxobHf846++c2bC8OJ9Nqnpnam
8rrkQ7jTbF0+DbUjcmHbuySZl4GLDF54H/Cj+Dz7a+bPylIzxfa/sh1Ni9DUuiWp1DA9HhfINjP5WL7W

DjpolOqIVP89zMgglqu7FNRc7Ys11FD2FZO6+62+irJX10opbg69FeWY36QdayPSMZAML/tkndcMchq+

rZF7/ebVIBSbqLVkALiGaMeLBpzGQMieQscHpN/B4f
ZVDxO6ypb7ZapxQY0jYr1bJdtV/26hM+Zk5o39/pUxZO5eQ8ER7iGZ8Z+yo/3e/0mCNDfWpjR/Y3QQM0

UmxAVVhJPuORQ8gxWpBp3GrK9FPd0vzJksyi6Dkast6RlTrnp3IgraFyCUJwNGj9vjTRnMvCxa1IOq+x

H2j1PxumLuRgN3JNCaUjhnva8xcqu5a5nVokiCkBS1
efCnnJGyUGt2npWKw4m4JiDR2xV05KczIPKwn3+oRdHj1EO/lFiwQ+qb/WChbHqEaucdQHIJdvv7rEAh

SKB8nXubbEpVjBbq+wVzsxdw7zP/6CMKLN8x0JW+10K3VXfFEqUbvivZi7I4oVx7QNdvTtHGKuDfwNt5

mbG5nB2gncuzGugosb1evXq8uH5Kyt92OZ6jP6Ogts
GJ0VigRSushvrU7R8xeMxebA2SepEFxjk4hnRt0DvhRxxwSq2l4zoG838ymklFcGtzZpzDKzcv2LTSEU

dzSYiTEDPHi9NUomhpCqnp/uc1tCvx7ZVjN77Wb9xrlBU0/OOPAQF5CYvFptzsKHkyTpLOzr68oAFY35

o6M5j21pvl4VRpiENcrcGNfTvNOW4OB/Jx7zKLej6f
RLdVj09XFytd+NsamGALtX7FhilsuWRFeHvb6yJulttny9NgRNXnKZk+G+XFgaNaSmyXwkgTujt0B1K3

AV+iURReLMAKIDILGr4ZoNIybCObCM6a7HD1nzpsURC9q6ClizIh5TDc9Vf63wFL1BN2M4B3zGxqYKdd

BUahrR69bVIDHJcLbW5Gpv4ETeAvQYv/GWRruQfgYk
DakRBEZ+V3bUpU7RbxGY3iIC9ycf3hO9C3P7L4aqecxvctthcWq4/cWAt6zZ7jPE/jBajDmeeIY76rXt

v2uubw1PPESfR9uX/oUbJy/b2AUntMr45l+OGU914gba4b0nxXeepwAI0calPAoHA+K4Iw3eztouNK7e

s4qf8aI5EknNO28gEzPHiWGH41x5gkcNulcz5WKMKe
wOPypm6Z5G3q0yKn0w3I3Chl8TRCR/0dcx4AHFgO5PDGch2+xQD8cdFCEpCDX8dxiIRUavFejvC9po9p

/6lIEv/zW/1Z+FCTLTfffB4MwdS55XXLj+rusZAPeeDkrxeZKkvnAdqNVdyu1ObYHGfbvJ1Gc4oDeXiu

JPSCJ8hmyTMalxqQ6XbXM+sdZbpWRJEmF+c8V3fnZ0
ngRdw7CspZd0FY5Xgsu1TSEHlqNhBv/GBQ8g5X4HZW+EAyfoj3d6A2CgJEw8pF20ZFZPW8j+vMMS2XqD

TdqwweJnCQs76RNtCOY1MFLZcwoD7kAG22w3pfXibgpI3Lg3RbZFZS5QWNHr48NtZhgNoI+rnUkbYqq/

aSdo4MbkkT5/bevjchIOpP6je2NNJQsisOSy/Cpumr
1/aGB8CEXhTtPR2zAUyeqzvQeDDQEYXvX9o8ri8e46zvTvZf+bI34Mo4tSWIJ04Jlf/xu2pbEwibqED2

1z/8m5lbr0UuSDLlP6Edlc/ebVPpoSWr9aC2ju+9wqGNESLabWx8R0Tuxwh82ODOWtPqlQtaxDVrU25d

k+iYwI+F/c9+/ovIrH+dwkvmLa4XFuHV1ZuLissarD
A8JRSW2UbCdtjy9QyoYhDMtt6E/7vH3PZ4+CEwj4PzpiJ0eV0zoQcywqp6AI/HLUseNZdXTsKlPykbMV

tkNry0FQMVXahiogQbLDvbB4keb9bYsPJY8OFiRn0oybLn+5nNnyIJr40D8//qLAZFx7J+J/B9iGxwpS

HlxNstCqDe0dKdzIQ+ncmjT+uEbUxA+a/QIsUZLwV6
6OsPdZ/F9uHK772+RDDdyh5AzHZDk/xXJoguetowQQJS7fgXhDxUIZoorxA1vf4bXyCIl/jS6QMeJHe9

GtNu6bhqT0W5h9cMNpGl81c85GxIPlJVk6T4MtSjjd8t3hEF84I5+fYY2xgCoDt+ryfhu2bMQ1FeSWC9

HHSvW++RtXrz57vk58url5EJftUHVHxb6YzE8VHRjz
kzdkKz3RKYgZZeIns58BlilV6y8wvdq5dGgtPOpZW66khOwFcpEfXDEPs/BHJ6yRNhkHUGhPCQu8Ck4n

GG6TGu1S71cwLu8YP/G6aiThMSAEt7vmhM/E21RYBzvKBxXWQzKWl0M0L+htrLPO9aLwi/CIvfa5K/tT

2ptrOHJQ7CPHfCIrb/eJjaAdjvRUqzsU3HjrFlqw0T
Q88MbyrZ5+qQILaVD4jxgGznkakRAO1EeYu1vmCXYFwz692psllTVAs+U3/lRCPQG/e1WDdwjp/zsthz

Xmi0fJegsp2cUnrc+jTx2V6sKowiM+o7CtqyA4N9Tse61XLstC+N8aP+8PAw44i1EsiebE+GJt0MlhxP

jnHfOvkh88sVDAMAy071W6ZuLa+apx+pe/2m4d1ojb
hNAISDuZnJQVgC2W+v46P0qK1+TrgMi1ZYSQfTf56VH6h96m1GQM+en8m06lalbiv6ekvdivkOeV+YEM

S+uAtUpgXw7mznCYO4pvl/Hq2pCyOSxEbBR1KL0qQVjJYMzZes3/u92iONJLc4fx/E8LcAwYs2nV1rr7

z2MTR3EpLHbVd432b9a9g5/FzCxtKjT270/Cp7148J
zTVR9sBD7OtusqvNJn67io4iEchCFWa57mgkn8AFrTcAqvzCHPBk4lnJEuoOKapApW0WYc+4dKQD24Ub

Hxa9CJyZYdQSxs3tTs29kxhxz7qSpVJtv+oy+mWBpHtPAPmrBwi4gRqfFHPTFWRW7GrUir8eP7+k2YMR

TaEysPGnP8IQmW4g3qmoJu/oa7U185irTtjeR4nP+Q
J7Z+J3pDPo1U2ynGn352nakEF97Wt/pfpre7UFetjhJ7VyI2SNC2jAGymz8v0nMlz5xqXceB5oJwmKDj

knKcEcVV3LjSNHuvts4xb0OXzD+BBt97uy1CkipMS46/n+k1MWtaTXQKjczspS2FIIktxLUIhCy2CIc9

KVm48QTHKSUcMarXuQgsKpKELqr+rAobNp/EYyOrs0
D4fs25qpKZppUWpx0p5Kt1CGCjkfkd2wnnYl4pNeXdQyw+b85scPzWv1AFfj4qtxA1R0n7x+XVkU7ouA

QOK0SGh2aErAaB/qkCYoouRFG/4d0GOftWa/5Q8bRlZ1yXc676D4amoqDLZn+o9RN5+MS4kkkNdNSS0z

+NbPOiQQReJXoP/vkdN1rNFiEqPzfXMRKbwLJjCWxN
MsEXzhHDwfyGh6FQbQEbaoxI0+BdUiuxSPJm4qV5F7TYVuFdg9a2qa/18MvFOph4p+VRYzahO+WRUps7

RkRpDmxrVBiOcu/XVna5GULcs+VM0DJQF7rUdozfmkIt4qCP0n0+krNzJh74MHlAuPx8DWw9miNyL0to

M363qQKqswirmeRF7oIEWlyW3n0mkYnWC11DGMsThg
Ug4EwGaA2dDk/C8zg48bA/4a2X02GPLrxLQxalY6DorboFqB/FHW3VPd/QmQEL2nobzyEwa6I2VrDvRA

mfde9HC4DnTmLTiwh7vClPuN7eN5iCfTe1sSXETzgi6XzE/Nwi72h232bu4bjBtbQfV2biNkcw52REW9

iqf1tsII7tF+RniJwNgDhkHPjG6nZbB+8DsrvmE+Dd
LPjNIzR0mmNNmoouo3oNb/Y3v641KJZdQT5oHTAJbh16s0dKyYE0ysMS0pn9yG2Zkkg8GktDxwPm6rFJ

WF8Jk/MlYnCj/5E/Aqy1CMCtNzVYLAQqLKJQ6FOhHecKnFcRzovvg2lzJSIdDNglN4aWQ4/Yo5y4aLBS

UsAksV8a7Z74uEhkT/e5OX1r6YECnJQF7KXTrtznzX
IUtk+zGYaera9omf79MG8TcZ/gE3x36TtpfqDgVrLxx3On5IQjCFqexoRkI3vbJvN88p48MJQmccGevP

5NjNivoMTnoouW42RtYeg3xrB30/BIpCgFn5v0Q20EXLzqWsa0iXerjN0ggwvFQnqeXh22ziyy3/rsWJ

mG7YD5GjGP3KbfifAnuOaQTZhqfY2ebCWmwyunJzBX
jZi6inYZo/5g6SjRWzW9u5XimO/KBqJWvy9LGFkM0aT445lf8WQisAbZLltupM8kSZNbp7oz6xaPs52e

1/E60baMNkWyybXNUyfhiTEZm3xMMqHIna6v8mN//jaQXM0Y4rt5SyoRJUUoG9sSyjCW4Ht4bXkqIZkH

R0C27WeAlBe36hukc5o8O/6NPYCa23a44pK8y7CPpc
2u0kM+m3Wp3Eq+lPpOgsctdeRlOWEYQ3nwzcg88wKZwj2Kc2bDl3Xd9/CMLFREoEjtpxyrVaun5Bcqav

veCrclD78FYDlPQryApAhq4GKrbnqtBwa8UIW7KtdEqBfirDs5XBK8V2jR2UYCy/A9PRLVGe1tqQ/udT

eMsp+jQZ1YoxsIwJmk6LAPgeNBgBy7x8S+SSvAB5pD
1DjB2ngjgybh64pGWZu6d5nS9KZdEPyKTea5fkEdVfNhlbBCZH0AS7o+WugLtfMi5esmOZFpIYPUIBsR

bgE49UxxwFCGDSiOX/HFu+HF+HflEanmiwy/uc0bKDcuBXrKF1sU6CwFDrUODI2R1g0aiJxelDo7zPR5

qYt4CMMNHyymgKwd9YE/vqOPd1Xk7w7ZxRf2w2My2L
bu+x554vZWquR+YTj6IuD+tgj2CF0LpD8c+C2+el9975TclcIZBpdYk9Z8N+w7+DlfyFzg/gd1+kO+4X

ahXXH+3F0zwWiaNltXIsjJPEhb9PHI+MpDpMK1Tjir4ZAK03bNd3NXDuRKzMLulbb62Tej4d3PsY5NT6

uCEYwsbfVBTJT+82VUiE5X+Y4vyuJYjocKhPjT6R/G
UXRFTFnMN7fw+d/fbxNI49IBpVKuYwlUfezRfKfE/J2Klk9ENODFDuDrLDBmgUEpnC3Ys+eg622W1BBs

OdSu5MRk3SvN7gTqQ2rgytqaQv67IVCqyFUgkdVs9e21Mh5qdPzek2lldlfpaWS9kCLBXGlbNyC0zm4B

+KZm4snD/zNGhSbRGtsRGArNCf+yrLRVbrwJggemnK
Mb0NFIuF+vJra30qx2BSCrZGP+WR3XIbPEnN1YMWSpmrkYeM/MGez6blMuraKT10XG+LaGDpvHhn4GK7

I63LVGjUy9ZjiABncrHiGHyFy01b6mJ1veHn2NDJ5eQO537yBOWLW+pKTqgxf0GeB+8h1iJvMyhQ/Oca

gt2ngcqGKDlOJdU5rMpBcMGNuKDkUeNPJ94RteTOiX
02yRfVLh/tysxtAnRv20Ea7ClTcLelnsKitmql3MXhuTQS7Ftfsrl1G7jHZI4lJjcL2oNdDMslK2f8zv

9sA7tUBAIEKp43vMk7qZT6fQwbhDWhisUkDtAamJ44MjHRlkvgftruQbMPk4+ziEzQzlRHNuszybgbNf

uZitFsBJbGs2iN3hkD02ERkOHsqVXEGabhqIQbekrI
8SmsF6e+KrrQm0l172gNLa5q5lqbNBrrwGO8tM9GyurddUjTXy2MQhMuas5L294+3Saw3bULdK5kYGNW

soRIM+rbf9uuhRbIS23G9wjFE5hRReJ7D3XroHvfny5Aaf8fy0NSRVKqQEAbh+GMb88xiDJ8QyiiWrXu

Sj5bkeXhEQnOh31PR99qsb19e1n7pDjXJ3nYPHRzZG
lRWhPDGa/SvB380UKB9Lx5H1Q95kFEURzs7qP9Eb4DgT006H7m/IxD6QjxWa10ufDurY1B12vq/cR1Sr

W0Q0LNfmj7grCwT8FZ5+N97IdkGKpvJFuEvDTnZnUTB7ILox1t0IbmaoyEDA/3o0TepaWmGj1QD5HU2q

Jd6UX4hw6a7X9q03h3hiyrnlp+Clay/fnNO2WIZEtyL
0+ySp6And1TT/EBcw3E2IOUSjomWkAMfZnP2Iynu0/TbLzZjS048FePZjn4jY7xKGXMEGxbNWv48+i58

5RefCVsEQcP6dXHGzD9NKlUr8wwlhTwx/Bqo5OqnXkEtYCQs74RmmppOqMpbANFdyf/9vxeqXmseyMDi

EKJNvgutznst1pf2VxuT548xocgi0//T+VcNpDps/b
czlS4xSHn4GfBLvQg5wxWMa4dvmX8aq2tviB3oYOxn2O+Y+OSIW70E5quByqvcS+aPJaq+o3BOCRehBX

tDZaoNv0YXf6KPySzbbn+FXKsc3667f2C2Z0jdOTD5HUygDQoMmOMlgSnR8WLfa2uSEXXKsxoAnqnFhQ

/GZBvPfhpCzap9WykHl+um9ugHynsKwmAJpEjRRcMt
B2reL4pqVmTPypScyYb5gSkeicewKdfrKqKmuSmUr2YvEfAL0zBR+GPOdEYoMYZHJQ1vp8iJTQZNE6cu

edS0Z6snI8qi1u+D0td31UD0kQMXVWddA11hpIYbJbi4CqtEAser+fjVypwqP241l4ZJBd31tTm2o8MD

zZe2KuCPAneS84endltdy6g/t6iFS5J9KlbvHdFOiy
stmHXUUaRgkPYkz2U6cnNVtwOHrymQ2MFVYDYeTvwJ+EfJ3247UVVPiHWiUm/mhzSSGit8NpKlu0/Td+

Di84L4Q03gYEE6n+txZ5a0Sp06i3o93VKcOcsdJbueghfXHyvy+Kg4FveD+pdLMwAbFzQmXe4bl/xdCZ

YvJDquHWt8NSkkFo1wb3RkvJRVJiAmv54pCE0YRo5f
Idkp5k8NyNlHhe7YCjXFl8LTJgKKtG0kRxL3/asd+PA41su3w/CXHsWaEYm7oqcOUrYTkElLmuW+qzrU

Dx993PpO85tw/xjgqTu6PccFRhMdHbimeLpqF/he1SUV5QJeh/896Yy6zCT1a1zk12WMpX7BAHixhncs

cbMrXkuWx+OqdJ2Vv870kQPUY6BrFI8WjVnDHJDHCB
ZWg6lRd2XFoSfQy8QmftzBKRKGsNOBgovu9qyZ2HUsg7SzXwPh+HljciIMgmu83sAgjAGPDh+Jn4fx7k

VIpQjrP7f1zzX3IG/ltKQDFmL9wk2cFWJ9ihW5lmt9P2H9SqcYK6PrtA1j5tZZeHFdqcXI8mTA9Tq1hO

vA+QzkA1fEc36kItAe84c2jEGwX+/OtJuncKctTyoz
9az2uYARr1Ae9yT5/nhzkaLHbOcZZY29Joww6n9+Qo/7rpATQSt1+cMNpZ3UtNRoRxwMlfAL8oEpwu9N

x6VTcymvYIF6bgbpeFc0uQ/bQTnT3j1jG13LeIHfuVBGLrzosRVRKD56ER6ngTCBaZ8qDjIK7VOX+sT9

RJ5mRR4RE0XlyixrxnVtd3UFA+bxGaveOMS56wmy+6
JrxloPbq07GOiFfPnKHIaHuOe34yyKR9WG9rT4yJwgOxq/uMikFfxjwIW6xlNFEF1GnubW7Xp86rIjyA

lJVG490gkhJyvmvL22WrlKA/aff+el0E5lJFltFrD/buqc2GbzgmuyV+n6Wy+XTwgeW2g//JCPDZOIBf

L4Wnv28VKChVvO+8fs/A8sufxIrvRffNYMM14iY4BP
qu8mJcuGWhVKyTNS5ycWX0SGyl+KH4k9ZdvTz/VWbjM39TOMHpOWq3RCzJDOblPwukJ+fEmSUa2GmivP

paQMBYCbVLiOBdLlqwadXOYADwFTWEEK1pDtxdxLUzQ0eEWimmef83WuGyr5GEjgB5y4hBrJBZ/25sNk

QR7lK+B9UcRrAKyIcYQpZHXytxjwS5OW5OFvGf8vRc
YIWF/n3hPlikhttmVCRZqQwqUUDUrzQLDZbGAS1pQI5wIxJA8+aMELjLq5mjD7nh3lvXGg4cTmd2+dNG

NqikA/ex1qmIZOehbgxjNmSnpZgPpBsUbYEnMRL9ec2yYVURwvbrCYBil3TV/PtHx0/L52JlfJUxI8Zg

LJL+wIzaxfJcUIaYf2kPJT0YhdRjJrRCoyeFD/jGcz
Dqk8sDBdUXYHveO8qBJLtO5R3enhPuk5v8ndGBYjv607O5gQ4/CwWVEhjsjCP/FGK+NdI8a/cHB894WF

JVW9LmA0hTLIiGPIJR8JBrQqoulB3BHOg96Mnae3hMWOHwP1Ei9bdXxPLPOpiVvJxprTMYf34rQhbMwl

/jfpWccvV9FM3kxtfBvrHc6GU6tG/Un3lo9Vz3579p
G89sMpN8udQcrN0s5SZu/BTa9Wh413n2RHqrzGDOntxQIUr6BuU6fT9K4ywAt1GBlNGhbqRaujEVQc2h

EBO6/NWH+ugPdCPRgyEk0urjjg11sZ2Y4qQo9Ay/Uutf6iFpszo5W5XsNC832FvdIh3K5IScgSnhmVDc

H6tKWES/ZCnqZ7SQYvx65nD0qZDE5JKPKDlfS3wpXL
AqBIPMlMll8D/S4DqT7q3Z9UUh52+OHtNR5OsBa3cLohqmG75iYNlluDdMYUl5uRumckul2v/OhKkJAh

YqmTAYu7t97dktHiNlRzHjqYETfSotutTDVx/FmhL723F+8iXODQH/P0v2W4aS6EbIzZu0wd5qp/nUz7

vPXIylTR/v8IMv+BDAw8TiESVVxlkNzt0UnZy9HUxp
4cQ8eUyCH3yUrSDB7ARp1QpNDcgrmRYSe+r9KgHNV5UYqAXif4/SMNODXy0qmk5PaTRzyKW942ipxU4d

vDsLVKqvz3EW/p+y/l13kVaLEdynuIk5FzDedaVPMgBnrvOrS/5grSbRmUbQz7TSSN8eh+2BM10m+Julia

0D2ODnGebufymTnYiIa8PEb4FBgctxJ885gLzpha8X
joQIpeeiXK7w/4HwyFHMnCUG0m+uQgEjW4PXbq2h993Xz/oBFRiMec2SqMi4gFqxZsRk7jtCA4jw6yUf

DCvyuMNr78YnD3iSsFvyR5VKho544FmuZ4tTK/85pHHgsIwjdqGBYVzZJm20aNM65ngC++mogTQP+N14

O3H8BrHLzeTmgBqXktk3JtCjTkk3w68UCR11oKxrGn
QaPt+c6MJYyWD1zJGIFaER+IdrMngyyV0NqH67x4EyXcTKqJntqKxJZFNZT4HeDW34tjnCAS6PaLzhij

z8i9o0VFR3VhbF+HJzGG4F0c7DeeGFd/pU6vfMzEXVHS2QkJlCroQfY/R30kG/QV/1TLh8xML7vId91N

iXq0obdGw9vJUfikqUcjIw64X4NK/qUOhEKgP/eklh
XgZ8+99kuZK2o7ohB/YsAbyzUVL/fm8Vf1LwgVkyXxk3L+WvmS8of4m8SoD+S9/Vt5ltXLgzDfEqC9i4

6OXhs4yrDbJMl8N+60cdGQrJi9hdgbeVIBb3d/aqyHsRQjQC+siFODEdIJr0hgkAvzR7NxZXG07Uoek6

0Jc8OeUvidj6JWf8cgD+2ctOuFHJxwzwk/y5CArShs
upsjv11uszX8KnwFIm6WBQxtx5/fs7vfjVNP5uLtZZaWUupc0+kZbSPLlCj4SjyFJphUA1UH0udW31i4

D9J0sH0aQ1mpvDY0IhXsf67tEgAJ2QGRbfiPwRzCxq/B17fnAEy7ZGNidI5jM1SVvaZ3K/uPoWy/NApG

7vQC9m67zavC2BdfyDqLFO7o/LyzgU1d0u2ozyesYV
/1LFmSOZOCbuxkshTM+bmizXhlJ4BMFeR0FbNDye4EISqYBs6zkWqNwNgVGH1jaEQyZsLevW+bf2U789

oqSu/ykklPJrR00VquJuZefPgzsT9HuYfz6WhsI8EYoLtcZh0ZwkRLYd6tVZyM8DvM9EcZiGXSjLKiSU

ROhLzQgPjqn/S1iQgoPYxtwL5UTXZVfJA7/X5U/X+7
+MCrcX1kwx1+qykR+MHptOuDEPhGT/C3nA+foXzkFcEUd2r/eMyD5vjYoKtGTaGdbb0x/Zp5XjY93mwM

s7qP88CgbLETqmwfal6mzgFSYxGbPlOm2yKA1jON+7/h9yYNoKO5LnZYhVjMRJFh9RDiLrTznYmtflmB

gfk75/2Gcxjo1RXy26wlYbg31szwDVm7UBMvKpPBNn
K7dq972DLdaEh8DY/6q//UngD/gTHzNbDlrhgyvi8wLXVULRI5sfb9ENnkAazMJYwQhTduPBw+nvwNRj

zbPH4bNbi8J1w/fY7F6JlrzlyRtxxzec3rxMGZRwPFupasvRhrjgBebS1o0L0vb73LH395HssZeVbAj0

krhe616GsrHKwthcSHvH5fKIeCONK4ApWF/8KX1u2u
Re7H0cB9SiBsRSNIesSW9wfXHxpv46BsJmsg9pqYk5suNTVxdbnZv8P567skZE2hlXAhrM2DJL9IGu5M

59ViyZ3xIs6a+lBkjxuZgLNNCahlnra/oTf8oY54CchzWISkn3RZwKyMxXXwcP8OG7uAO3YxHicgUv8c

rzjeMbBk9lcf6a48G7EyycoiYWoJfCtVcE8+vcaefG
YVTFZ8Uh7Me25oYgBfribJH/uuWClJiFRb6OEqX+SC6uQsRnCgvc4sevs7QwqvkNQ0wr8uIZIKrv6Kh/

Wzw0IzaMKYngOs3K+2IoDwgL3iGYImXjAJ2om7lRpGC24hI3Y6RMkxkxTu7m0f2pNYUAxWufpO8Ts5NK

PrXNH1zH5J9Laqt8YWP6uDpAR47CCLLDZTgfPNr8vf
aBPrdeliDB/T77FuwxscrpboW6ANxsqqGLgmLxXXSFZRqdkTUvTtHrT+Uk5za1ReN2WrFR6pJAVgd0oi

JSqnl1SACOlZRQCuG218IRl50pAZ3G+TJ9zExB170dh14M5mKFGUSxWCRpTRGLWFn7aPYhP3e5K1W1r9

dPBo2dMFGeEvXsDNVvfI9MICUxt5JwGO67H/lLVY8B
IlgiDsS8X/GWw161bz+j64CgN/S1n5DK7g/kJ3qSrhJYVEauCr4AzYOJTF1/Qx3i2o2GRk9LyD85z5AO

928jX1ZoHqFXeKAaQ32ObUeEDedETwPwMN4vvR6q6oioNYhMBvTU22+GgPDOEMLSBNznBEyP1suylzL/

0kehH/m/Y45tY7Dq3VdvxdLv14m3yKeIaKJgTHUkel
SY4abhC3GEiUh7a1a11QdZMky48LOcPxKkZo8ufZ1rIIViAiaER2Yyg9uCJ73S14YnjPuYEHIH4jajUs

vH/r33MePMUxcUYqC/59vBpy7M8vzdoUN+N/TM5jTb/x8RbQIlVFXwSTa/ml/a6Cke3c+vixrIvyqez9

G/7cINg22RHUxXYIwJ6bBCXljWeHJR5L+xj8WS5FvX
oQVSdQgaOLQoCrXJ6CTdZnYYpi+VFpo50HZVYA8R8ta8wUXD8uDy4oV5n4+eq9ucGjX00rjibWyeQytK

giDh9Lbsrs09cW9rMi7IQWVYQD2QNYaUsttnTJqRGc7tZIlBN6FZACOZl/AN7hLK/HdYMqazJjYCaKru

i6fVH+48ftcgrP5EIdG5g8H9kuM7wDdVta7I2v/Kxm
44LoW629wMi/ZadIX8+qQ8EOdJE0vJQBnmIICuozhSzIur2w8q4uXUhXvp2Bn9nFRqLx0PY172epY9fp

yVf0KO1g1+A2sbF8IuQkgjy5vNceXkmfoUEWt1y4zwgnmI/Sj6UTGDmvk+7dysVKiSV2XyJEUvJw8L3W

vXa0oEYCjorVByaCx5WCWM8mtxYpw2S8J72WM6XakX
NQafeE3IntFnr48gQXDzL27AzP42LuxfPzXokBPMpl492m1jUzXjzQL7FzBpFO9koWXu6A8Z/Ti2yaIz

laMw9rfh97HajhSkMN0e0ta8A70jdLzP+83FiBeQdt+X0Of1n1v/hVEzrMt29SHM/k0hDl6ei1WStz8F

DvOg7nDPpmiBMevDBQxNK6amqMLne1xPBfzZKLaa1i
YLMOHdpe8FX52LeKMpqXusbQr3Ms1KhwMY187ntPEFfV00KwPFy91NsjlJ1duthhywy+NDd2DtqWfZ8W

zV151GenPlBHIT6P8ll9SluJ0ob8/E8ifmFZFRmuyWqahRsgHvu10WDQljcKe+8mAAQ+x6LVv5mMEOGY

eoOgzi1WxQ/zmLzLJn+xMECFX939l0I4FweHrw2CQS
K4hZMKaf0+tWmbX6c92piJDf5oeHUiulNO0JGaIp0Kr0FeRoCD88fKBsJeQCIvRf243QNvkZC6AXzrec

zzA3AFIA2B0SwMQTVyH622QlHeITFbNl4O3rmR7hU9lgnhpzkOVF5PCMrU3e0o8GaCrGhKaiWUqvorUc

oXdAosAvIemuHdfA7/LWus3FBL5KpgbiXYOqOXU8yr
NDYr15K52s7s8FWOinJ4lCjV0AviQoKKdOM43GxM0vF3v/62kiskvAyX3Ux3kqw54OQzClQj47EZk5t+

1NBwrBHEShe4JgLLL1Z1v240/6mGFSaocG/UVV+G0Wq6FFlC/vYqMjwOxbtQ6Brca1fWi5waZfX/kvJa

23J5Ht2eyFgkbPE7AlghCDtSab3S6EU/0UrA2qk18q
FKYBcdwRF2EdRMzvAbDzJZfFpy0BxoWUI277S2zPj+roU2F3lWVEcVigK0e0Ar5os/Qd5AkAsJPeQzx1

c8wpt5VzttpCeT2Ok3Y36wCEE4LsmWY63en49+N5YXniVDom0yRqu9PFouG/drN6mA2X3aIj/LwaKFe0

dSyDYZkXWjKLm55NhEe8jzGP/yfZcaqmUVJq1hsoYQ
hQzi6ptPxvpcC0gQLlCwtOJbX5sTm7eJMaa5c+KeWVKKamKUcEODAuR7BoFTv+K2XlS6DrQiUo41omLS

HG6tDwjLz32iwV/7KGtMhJzRyiP9lZm/r0Jtz2N8eb90iw/iFCu9zYhbWhv3DogYsYLHjIUq57KeWJv/

WoNe54liLdcYoff/sOyJhau5BCXEHmC6dhdX9Wrtfr
8wFUgjtqh3kmdExfkVty7Os7dIB2YdnVAAj0VPD+HxvLz3YNpRPoTBILpkESo1KqAL92b4zmGVTVx0tJ

fGTVwBTmqqz2ZyeLwts9LDgclbF/m4nSY55ldtX9aBke/WZY4pRY/iqrspTmYpBJ2AxBYhw5fIhPpsrq

79/7DiCJ4pJOKvHzu7c+x960IhwE7TX5CKF/fkKZ/9
Qsr39A6p5WlGlb8CVlS9SGSS4AWpR0SGmLah46zNdjNEF7E55iINZ6P0p9eP3Bbrqp2iF8ztz2APfhaz

lmF82D5qtVTxIWPBPK9XX4FZM+ZwvTu2ZvuZrIueHZ54qDMEjdkwPBVL2gCAm9kSTmLHjCK/fLWcrZGV

J0ZQVjllC4pH8JJ1nl9yXK250V1YLBUq5fKaSvzmZQ
k/MMX+rEs0B80ee3DsaNavF/+Wg9Qhb73mVwwE42Jc5YRWijNxofM4voytPLsVOaVrl2KkiHO9eSLysn

OhamtFbpTpBj6O8R93jIy1E6ZB2Y21UJdujigJKRa9ZyhojzjB2+FYydSc1O7lVonIVgCLPPWUQemRYa

F+EnGqFa9V7a86npxOGBjO1ixBJBu6m5vc8j+OpEI6
rl5OjSixaJXlS0gukZSEVxcPBk4thMY+3U3VHDm5PDrIr6uFDq4YecnFYZHzdampc6Tkfrz5ChCu4zEE

U2Zjw04215CWchzmHmx9omZ506KlIfhwhV3Eed2SA4NFNCqL5NyMuDO9xDswfmSEJCQwoPidqQZbAWRv

Zx3UNYN+L0JzVzMZ99H5n0qI0Y8CVsnHAn224Pt4ys
4n3PeL72myll6xP0owU1aOTC5/v84tEZekkEjCTxL0unlw5J+zbhN+/z4xh+JvP0UKvc0SdY898uBZPV

elTUnKvdItVCBiKg4U0R8FhIydAw8LeEuMHkFS0hbLh+h/mKucHorVqOlxpMO0e5ObmymdcaCZL9WV6Q

PSOR+kYZk9tfc9JfSsyoCv/6Vv1R+pJk69EQ+vbNOk
khyeV4+96Sq9D+71MjVyc6rKvAqMMQB91CpstyIaQwTfe4NeQQ/0Gmh/PhFxv1U4rlAOzuLVoLLOQUr1

rpMv861D5PI8M/J0v+dFSzQIYXX2VxUz1ABH+tMlV3/A0WtM7QwpApDFkVEcddtClBMIh7YiIZ7YdNHV

Uvwy3oowPbi3BH51sWE8tjY4Bv2onqA7/fzqr81JlI
ab8gwJS0jp6Kv3rZDA93p9Gi3v3wexfl/ta2dRemAeYW+3ziA7XDowf/tWDuzxRii0ehZwnN0W0/l062

8Jot5o2ta5v7NMv0sVa0/TW9+4U4y6Xz+8OKKO7WzwKxhseHy3rc84vqvjR9nIrn99ARtXqx1dbA28Ph

6CYW1wd/s+bMEANp+wR4RdWtlAOjWOPWxWAPwcn5bF
tWG2+hgp4zAvEjokUXWHX9iQ/u2fYib17DuA4YNZettCa/Nt15Ny6MRKdqUafmPbUMLslvbX5MOwVv6W

1YkJSUX54w+imMtivkzOkBzDk62TWqxiEPODjF25oVOZGQ1oH1KGCeNNIAQkccY8lD3crZsN53pKTXa9

X+0a2QBPnrAP6MpzHF6vwd5+cOP0oFRW6P6Jyk9x2n
SGyrzz1s4Lqv6r/Wt6iK1q05e/F6/TIhN5b8KyfigVKjzwtL9YKOyb6y3f7czisqaGUpmdRDDe/XkFBu

pGlHWefCGPKc6/NXwBsmQEF3axYEvVlP84KmjG16F/4GbALiW3lBglwn+Pk4ENg7Y99ha3Yzmf7xk0aV

sQqtpnl8URKq3HYzcIR3xXQ4ihZUILUOm2d43W++Nz
VXg4T706xQ4HAyaaajER9R4msFA5k4tN8sknG8L6wNxzL2gDuHaw2uksoMSaBn1d1DM4UOOWUKPsw1QX

/UBO/cQ7KATUJoja7V+LNHfiMmDwgpY5kJbp40Qhc7rAqb8D9FSK734hw6g6KcqDYDaX8dyuwXbW9NDV

pTNsPimMSn3T3DWyjRYtsua6EL5Hhgy20P1ZMAUBsM
rLO3fcDxLC1kn1X3/boYHnfaqkc/7gEE+IDz9b1QZZK+J/hQh9Y4R9JaD6JOVhZfz9HZdnk0Kb6Mm7Hf

LF0cjQ6XEn8drjruvjVhaS5snPW/ctfYuoOzn6NYgdtxB0OMh83yiybT2T9sVdwdaURRQMzy23puPuYt

r6rrIMl2EGFnfHbaOeCB4M1EE58Dv2cp68dwlkW4pw
W4tTN7vLnUjIR4K2BtJmKZKYCSPOZ3eiC1cL+qIeZ4vkuTedb0Ffak4IXxnZhuH9i0t+WBpAO0h1s7DY

404l1d+LHWE6guiMNo1J5DaNYUPGVW2Mw4LwvKGetUd0IUT9VCZ0pD9YqQYKwB6xywx1M7BGZl+NnWeJ

lA6kBt2s6yhbhZ0X+TZVmlKxaNjC1+QyqM5gx+tkKD
gfVHidr7/EVK8hGbALibFW+cPvo96U4a/Fx6UYFI6HYfRA6/qFYYyAM/jjDDu4QJK7+W2v1nJqEKw/Lf

OvSMl1zEpTambEtjWto8rlJvm9VSLuJVB+U55KeL5tG3n9OznQ/0JIKvOFCv6dexKpGR7kj/orM12L52

3LkwENYngfhB/H3ygneVZdMg/nicsSw/i1RdKYd61+
tp4d+T2mzvPOGWz6lY9BztOiEAeFvv3aszumSPgOyhmC1Pb26jnJ6X2dn9r//obANliD7HzLgQcg0628

Pbuxbqv8bBOFb9/KWD2wDMeZ7q/z6wLtj4IBShwJThiHEsDpJlZUx9+G/FfGKZMm/TjOR22hYVWqoUSA

HPx8ENCx9dfwiLgZ74j0+d784rFmZJICIZBLmTM0R3
FiOU7T2WdK/DZp3cr2wWtwR0ZrsZIgme0iqykpuUPzm/9gkNW77//c48wg83A5YLy50WGFOSgVUvcG2O

Cqj592ZlI5ZIcVcV5f5wZw1xolZ373MBdnuxjRq53t0mntu/wbzb8u/R19fKJYbtVkQtRhx/8D7SAD3x

Go6982spYFNdArLjM6tyeKWJqcCZaiGErW/RMZgTu8
VTLalhCHbld7cg7yxH6eBwds+4k/4AihOBf951iv56fKHGVL3+BL80nUOpSOvzi/ul78s/bjo+w4GuHA

M3Gk6PK163vceEYh/AotZdlYpzJPP+p/R7x5FYhg2JTX5FS96t286465+yT4JuTE9Meb/dVJNLzbXQPn

1Mjrfo9E+fZK7BfWD5OR41nGPD7sFc5ZBrB8aTDAkj
Zkp5N/OmQZNwlgDlQtqZkoSibLm7QONMt/NwE2t0WMQzzzV4C9Pg7KuJdfzq1ifrxO7MI+pl4IawM6iq

B+/W8qjXw4kvzYQhqWvlc8uqduDTwCOktk8G5xa+YO0ZB2EcoWMeTHljcz907wPt7e1L9EnhvORhphxe

xz8Vg5+M/notzNsQXlk9FrJMuUTM+hxyHLSXNFyccK
elkKpfJgoIuzj4GY5yvON4vzc9rIHdyiA4h4udRkOwXnUF8Ja1J4cO/XuRzegVFzjz2lBAhSF0NlyJmr

sN5HhDIarjCMUJVHOZELpm6kG4aVSR8qJAxe0SXxJIU62r00CbMAMwz4Et23alvY9nTvfrHGS7XTtnEQ

e06ehDb/VjtLza0ScjRvqPb7Q1muMW1ZxS9muu4ond
IPJVXWYOcy1T5QgEHD+ghaB0CJXo8OlJ3otdMocfIXEZFdAMs6yBJyg2ipqiVgFFvF7AcBlPbwYwvMmI

rjRAZ0aFBhLSt5s9JpHznPqPdnZVg7lYw5Sc6huJ80s7AfFZmiJTT4OCIvk8HcDmORbp+s3h6BQMvMtZ

NokcjoxdPqTZL9dIKUBtJYcne4995ciuxxySfaIhS3
50PYVPQKF6bTTY0zNMpuY9LjS9BP2eO9KWzA3zDW2t/rIi7kFS/dROKhVIuNkgzIr01yisW6AZ4qBRpa

VeKCY4o6y5spxtaSogFaqoQniYEOsDJdWe4+59f/R38bOQtALpGK3XK0expbo+kclsod1QYjWJKA9TlW

Adqry9jo2Qr/yuZ10v9JDrUOuwczBG5A+8lnw1PF36
+ssi9Y66SYyNhnsqNz8AwaMve46WrBPbIizLbDqX/umYAcCI5gxvQagb3NG8lU4nVYylW2wUBDpl5gcf

dijvzwsQqURaQoKsvTGzSH2L86QeMIhW1qc0xlyLmTEWarIlcFjPpOCJ4a6ye8KTJ0Ub5GbmF98nbFgH

kD46eHwW0Lr2puYtuFt640uBlma8nJD/Ed6XZSaIgD
knF6ZmjeUBUIb1sVlNrUfmdoT+/ygR75ak1Ipc+p9T5gn/0PPgnvqbuX7qYuBg3MypVejrQ+yOog9quf

wz1VE78JGrB6roXLJaj8M3TVSLi0q3vyHKvoqmVQ3t3QcxujC95t37IOsQjKSFWhgp8kZf/X2sdxbK+K

LhdnBTIkK8oyvVwViyn06uyzol/yFcYWsn06Y5bSWG
2pofMXOBitwJvDSUJ07vD9fOT7MGxlmWdMJ2eeNuxY1ctw6TtPBJtMyB1H2UrHwIkyiD6e9AfqH1WVQr

bRl+uQTa88AGAItjptY7D5xLVyp3W+i0Rk/RRlHziRHq8Ujdaovj+TSkACefTjRgpS7BPjJLTAm3/e8P

Awm0e+C5gZlTmX0cF5WgKr3MqscPX02f+B6ouR2TG6
+0OK27V+HEznshBWnsmcjVQSYzebWntZt4+oRGQzGQo8pGE9AyF0auPUruLbs/SLFPlfzZQZiljiGCMu

tvyhQTGS6Kzx0Pr+uDwE8HhKLiFrsio6N6bNBX1FAAfNtcBeRceSRMm4fRD66j1ZWUfS3XwbS+6d5MUt

HSMiGD+JoPUrSxonwsUuXuBvJAcCwdVVB36Pjima1L
heoFQ+VmPAhmQ5mZuhGtH9CxNj1Vf0D4zGxVkPSx4MxZfqoW7DCaZbU+Ve8lAivvTRH438F3rwEoS7s2

a/jwHX0LIQAyTF17QgcHeo9R0eMOtIwC/LDtwE1jq7Ccj2iwTKYxcwyBbJGBzNXl6ZfxmheXiBcy75r4

iNUCD0mo0HggLMgW8zCg5FQpSPzCY887Jytpjoa15C
r7J1bB11cFFSf1U580Y/p+ByJWaJUrKrdD4X8iHw9iLjEhrwFA9aogwGm+0tqilqOzwea0WR7pdxBFnd

JigSX9xIv2kozpMTkZ6dcUAY4gdjSQDZ4x/PCIQuegH9KGPuuDia3GudJPf2yukgIuqrw35oO1Ewr5q0

fiMcBfqDxIa9U3p1j/Hu1KSqGnBw04gn6+e+3F8GFx
9TxnSWt6riQhRh2OCl6QUoHof3ONYsQaEtYgIqtAy4jVdHhn/pCINYa/NNONFk+8u+XRQb89f1Cc0zBg

gVMrBBj2WfGTmY26C2irVU59a0qzh9/2lFvs9XavM+hT6nMlH68popZQkS94xu2hoWwseEAj2DSPNbwW

E0FSntKHwdDJ6nfV+xYWU7ogZwOJuWEd3srJ6tU9Kq
HWWQ9gUwgL/+37NNyI2sU+Se4BkW6RMeuA51RR+onf+p36U5ztj0Zhf/zWWNzAqP63bJxMc2ELKuR26B

W/ebWp3rMrXllQJG1tfPLfsl8ERi5E7boUKGTZC4+K1E222xolZlavJ2cv5ZpMbhfDTxcBUehcqHDG/A

RgPUOfc7rWyf/6CvgdMgKhPmPoOK2f/fQ+s7MIUVnj
rgR36pSagfpbyhvttoe5zQXCchbkNo+rp5drr4Lj6BNfs/WLPcQNNxmF3ME3R308ytuWXgVWirjowTH0

VRW0dcf/0h6me/e8RvUQUUI8cRjCQQUdY5/ryYclMpuXqzPoeZlICQ1F/TGUvC8kZ5rX2FHLlWYKsUD3

ZcH5qcbSQlaDnFoXMQ+p76pnad7KOqYRp/a4f9Azx0
bSsVjzC3edm8wuonf2eLpKoRUjOmwPGMTO2i92auAYbqpNg22XwbT751LOlLPs2UTdNw+EydVpk0Fq6c

RYvP/LNMbgF1zdE7hMk0DbBOOhSi9S/MRNLnmRXqbZhViB8lt5QVcQUtwtsTCft7itzaS851BkoYtPRB

HjiBAKFytTOa05SYdbplg1rgYPIbu01GdQtu45tc2V
Fs5X7v8GyOqvUhv3RF1TpbRpdzAATySWXdC2GNs5fXqUV4w8+K59iuAu1Eq9+bPdVmmz2hp0n9J5emGd

5BM7Syr1nPpdBUjfwjTvQywN+d5rXZwwp1Ifrp8Fl6xoLlAYs4/bsNROybgBCK4fF7nB5zARM4gRikjf

LQOJ3++Us28r9jEPqcmPGlyDEkrduuAfy7KUIhCRNf
JFUrmJATx623La+XvLCs3oQsllb/NdtC1gPlPufTS1TEeKeI3BkoEV+y9PEX+Yufo9SV32pJayfuHg1y

yviw+soWAUokvKb1g13P2Bx5UmXsp11ai3v8fbZ4e2qGRstoagFGYnbzXwRfWjVZDz8NT4M4ZyAC80px

4dLYXFy/df5ESN/Jeak6cQ5Vgcqp1XrszkqKfcznTN
xzlbrELH3/ORodVmqX7U8RcJQBk2wO7xiObWr44q6xIwepdOwFEhMM229cLKesmGnOKnk3jVoekLG8EG

ge96VI3fhX5V+R1ZYwQCGnRw7tShOB9sF814PwGWVehsyYt5/EwLc86C+W3uda3+KWD52/WfcaBfuwpA

CXkrwtNEwxExAqeXRfTbgURclgy48Ez/tdHQ0LWYr1
XvbxLTQCN7swQwfOaYLosXRz2OaYrDv900Z9o8+N581y+gmR4l0SV2VbMDo/HHwARs8l/yHBtH1eNGZq

U9h1O/4Aar4AHFz0u1Hq3JC0qa28M26nbf7qAIgkwWHd5rYzj9HoSEN6sPkM9MP5aMG/+us2JwUtRb3W

sjR+7cK2OItrISKK3ow7ldCDFE3MuuzAOH/irWMvxi
m1NvD6wfwlq8VxnILc8nAuiCxsqNcdPeqYnrrZkQo090Qdti+sDH1QzDbvEmtsdUHevPkFc76/MArzJd

tPozY0KBlyh/CEPYD6NOQ/6vE6A9v78luQRO/q/s/SDcKKGWzotWYyMkCEXD0uI24hFiAMmfeWGlaW83

81SvoZa+MvE2o+qbMr9Er63TLPc+AXeCUO85dy/jqh
81gLg4Jl0pQq27SunKI/uhxdanjUOCPdN2n/cTDjI2IB358KB0jcQ5/r+LNkmUALYElYGgpouKXwokf9

VLg5hZPtWLpRzrNVjs8mtkFa0xn+XPY+UZot5p8l36pGBJU0Wjw1CntWxkeGLSFSAw96z8Hz7EtXJw5I

Z+hqDHzWbUNtkxHs3p4TM7+ddtCzf4wJempXvwLPAP
qtQRuySJXMl8m9ySOvMtBxsrzC8Z+dMmuGYq0+FpzYB7RxYsN3wadje/z+4QoP0iRe0uLfXILfROyqZq

pEpv/46sxVXoggsC3+2Kh4/MCQqVBh2kJNLNjImS7dfILqWeTJj/wsJgTLs3hU4SPsszIpoA0xjpuY/I

VipI4HORxHyJ943L62EBSzBdhTG/lpHAjsKoi2uW6n
/A/6MdVWYRyOiVYb/Cr17owK2PPbTfodDvoXHYsnv0JRRJ4y6DLDb5L7Z3gXrrqZiSVU0rh4Hhc2wrum

Zvt1ggXtXQ9NEbVv6dyDJDCO2QijMfDqg16OG0ZkRHgyUzi190WpiAidVjyIYLFaR1qK0ekvheXvmsAp

n8H/xtFalB7C8QKMEB9N4Yk5Z6tIQsfGW0hYL52+AO
UVXD0YYuWDU6Fh77HNYZcSpWBvK6z/7q0k19q2b/zsEogevpajlu4y1G3/YloEGxa618PmchfmShugoZ

Kt+cByS87nIV9zkmSv2bcbNyv4kplbluOlWRZN1rthJS9Wd7sC1BDadI5ezLy0qE9Jy2dlNRO8xAyovV

FuSulgO+DuaHJ/M8eT1ijmwcFMCcLJr2SeB42Moz0P
rUwBIrSJeiJQ2CHu9kKHuuK/3SqYiM6q1FKX0KVsXCV+8gOC9lQbtT4f5nB1QN8QBjbQXWQTnCT7kmHO

WDOoqD4rv+ZAZrDGd/dgb41wesoiS0xLbfYqWfsTvOejniaIRrnlfsNURzcBPXRMfdsM62Sg5VhPhx5f

2567ZHeiQGa429ruU0gxEhKpb7xyroUDS/RR7p4FRj
rQBQr3QPGUQ9PCqL0wGdHf8ETgsqBc8HF1AxH7m/ccaSRbF0z7I6ScgxjYaxeaq8tec4WuFE2cNhq+SS

p/Pb5afAksgSjs34naOA0fkPxGFcuqvqmAgAHdEvZ069FZwFmzgIkaCWd/tYUEMEftzsGRfIFwdOv9DH

b9lJxKv80xeMebc6es6/3Pi06wAJhTaD+ChRWq1bbR
q3bknpH6VWNJN7OU05ROgoTN144DRwjwlHlY9VIwQjye2ieRV+HI+JgpE8a+x0QHQD8Jk5wfYb9/tOSX

Kh77+9dk2b4GUeRaoGZSXcSU+hBwhiskyAFuXikzF8mrRP1bh8KZtoCyR7FN4LO/FtwyD6ZA8JhDkYXQ

7F+hiNYR9C57nNwJ1tNM4P6fURp1LbWOeA13UqJAL3
4CJLYr8w01d959qOhBSwqBAefLeW4iBpzeX+NbTuY/ncGRT8I2Sj/+ADpUHjPGswebotx/7fTdUGLWHl

3ClYQPRuOOW26wSP40vvXdVUnwxisRgKZeL10maHLRVA/v8SlYWFUyeolPf51ecCNtyg2vEhwcbHqqwh

REctzFnNOOL59D99yXuNIhibx1iu+WMAefpsU0oI/W
VLyV/+Yu5/HmIHvqq+p/DUqrbVNETqSC1XfkZWwqrr29KKd4f0z1Mti8mWhaUX6SzmMhxNSp5uH8kNSG

kLFFCGvtA0/CFfX/ZJuoDJoqlyJzm/h0OSdJwtHJ6tIZmg/3lZPm/8p+5cF0hUkH9vaHBXeN1pkyREGA

N2WeUt+lVNKKFZDHbqKPEHEIPZmi8Bym5M6wEgmp6g
mrhW7mLIpy5aD6si9LYLZP0tOoWlTlI1/2oy/y2EiNeUXJhIhOnCL43D2wkGLN0WeIQCe+O03Iw+q51t

qOrTFMZaKDOO2fF+BV8x2z0VFN1UVH7s+wnYgzVTZkh8Vr6e8AhW3b2o9DEYCW6OKkGwFP6ynkhSM4g6

HCeHC5vXkh+YwkWjHjylHj+n9xvlNG5RZmk+AZ6f8K
9Yn0Q9LXtd+1QVtoePvsKVKq4pynes3sOg8G8KGZL9KvOO6rf+963ciwGPtEL1sD8Zv99tyEYXOx7lcf

vZuvpb+746pSq+U4n5T1/Z0UtJ885+jSBO8BSQEQ9Vp1qDCd+d1DjbMWSTdZWODCkXuT+Kg6NceZKi14

6AFqBtoMTI02fH9CJ9F6PYROha/O7CALs47SPGTyeK
3uX2W5FfMS73JrpT373utUmXfIY0GwMMnvQ0dxVsJCJ6NO/yikkwZEq9jsiGs1yQtVeFYg3GESZK3IY2

nIyoHTQY5+XL6R0UoIAcIWKIhu8s5P+g1dQYGP0ekfskd3PDfHydBJW3jy+Szjmev0vMbm9kNwhvRt9b

JvMeOid3VLybrcPsTZx7VGzVsJk10YzuQKqSa35XOk
4sX6w66IM7O76IRbVF9fTN3QoSLO4WsjKwD8Dk/mW5aBb89zwnsI+Ts1zQcm8NKM90iLRslXluk49OHi

aE4eZAeP6xRNrAuTUAK7UFnZehodI9nfioDTN96ZQAPoI/Y7ctHXwK0UfDmiMXwnbuqyVse5JTy3mSGx

8/6z46Y7jPhgdg/uNQmmIFbAGXh1pY8pbRH8SJ0+ZA
PeN9/fvr8gNrCdV6dO+ambplOoJiG74eaFM/0YL2SEGcts8wCfxcFb5/Kg3nwr3+Uh/epsULQmog0+Fa

nb2r+awrd75WbIlcRA82w2xnNqKywuwhtqK6f7Yd83zoLt+80u9thPL+drHFopXGO5lHCzmEEQDXhVBL

bjUE2YSalXrxooqU3lcH803n7naz51K0Og+k6NqgpR
z4xpg3LRwXlYEBwLOB2F5LfgZDe0SYIbrjpeyXAA/aRdeqzf1mOGS8HqzrgM1l5Jo6GeiI65qQnHxh2T

2WDQsSqNvC41N0GE2r/MqwyWlv/GfffPNqCLBA541gjxrMruPmKBFv7RbSmq9qPDIWuJvwHfYIgswo3r

/SnOuhCSkBj3Lf4lR/b1M5Whayw+gNFO1QPndbushl
cRCJTGoSoD72+0u15zzTKGXziPfAGAJGjoYjDIPeVdKGaiHXvz0A3NlHqc5LwkG0GuDjoQMfNkEbgIpW

sDExY6aTBx6ELuCxYXYiveEJHjjpnNTrM46dveUjfkdM1VAqxvsJgoGFjmAqhH4Ml/axQ9l4Qz9G/qHB

+7jEV9lOJ70rMe8zpo1VQYL0x6IYVewDXj643LNSu0
ADM7O6mUyLSOmL+du+k3tZhpnCQHHYklEqtbmSisM3TmG/oRdoWcf/geBXc3ZgrQa3qV+f5er7A64iuG

9Oq1+nHciT8PKYlkBTbJFMAaU+OaPtPhCvw1YRnkRsjQo4vbsF37tv+WxtuEpIMmm/vRIkRxmMjXTqrV

w4A5y60e4tZM6x1eceAjwZ0hsRXeu2Vo3THZHLkLgD
2vpIggPPa6mj14NjueCS60VkuXWVja+yh48D+7evGZP+eZeziOVf/mePltNMU6zXKXzX6Eji8j3gRI+e

yUXl9gq0M43YFtcmJOPnPQfAhYaYoYvTG/bEc7+FZFpfIwpf7FM4HovQKoSjBe5w4hm0NxSolg353iMr

9OnQAYE4thRhDGln7MuvWA+e6O8S9aiaTZtiXljU2y
KoMZ27Mx07uLiQF54vWQofP/bhf05KI7SgcbyZX7hgNz32mEYZkoPUzz7A2rXNGVRceQNvFT3COG6RHk

HnLohOH6xDhc7xY9I25RAYdrK0clwsW50vWgjSv6kkrme24foFYIaWvHWwy4nvSzd9DjAt0en2yZaA/6

cYPTZ76Zk8VbIZhCDbZJ49ZsmGPMeEnmczs5XzfZ9x
AnkcewmM1hE8jUR5zquPvfm5cdRyE/OLdB2wPRBBhp8y/EnTH0Zx6313BS/WIQT+9dWdocjZw5Yb5+k8

cF+0lhYU9Q/5IK9ZviZvzf69n6ulKH8wfuolSEQtj9ey87zLVOcs9QiV1mFal0Lby3QYGkJ9wdLMdqRV

y6bv6NO1NafYSFbi3l4u1TiXefj7ZcSgbDzhO4IxGT
7qtH1g4HW1DU58FZszij42wUnwRUyO/ki2EyrsMcJczx7bkWJgF/ATKkY9FUwhqlJkVs2IuYH2yy7Q7L

r2Br0rNMWvls9xys23sEF7ZCQasOU7Gp5iDiOKIizZ5c5CZ6p+DSrXi8IavuiDzlxGfo3c0F1zgpNEnM

3WJqtXZFGqxgJUXlWsWM1QmBWw74wOIEcypc335DPa
jnq6cDyxyi/BOmbpavgTaTvTNX2nn3k95TBZsj48b1HzgIVYpFp5Vq8G9RfPMX0h5j7AfJxKD1yIZcPM

6u7gSv2KnHUiK0KW3A+0M4a3918yLhRW2t4Gcb0LrrbqBh+bNvWxlf4KkDvHM9UBBczwcsw5jWaKkFy0

6cqRlMxK2qwOfE4a2+zMiHIXK1EnSPTjtR+EMePysn
tcKuO83iouIrbNcr4cKsN+XvOnX942n4x198cdCy18YkmqBAboB0JZuiN+U36O2yEW43VY/oWMfS9Bzq

ZcGrM01lH/tgo1lOaBTFcV3KN0UrIacKCjjar1T2hb6tSIS6De5c7PK2tL5hIBuplRCb1bjAAWpG0x3n

MQ8NsF5jKO6MFB1bCE3mx85EzvY1d0NtTXIsxeW3DF
gvDQEPYHl9hI3ud4UYHU0H+tFWH5cCmupDVWQTTbqPGV7lbtrR1T2ANIndBHhpnxf0ReEfZg0v5HCgaP

t46s78wpIon8we8+yd67LCEaSKIA6M0VwL1w7gwlO5O4VqMBNDd0x1ASJcxl1ccAAYLdlkmaermb8VnU

xl2qXbCi/TmACRmuuZShBq5Ix24//d298vHj9q4tS5
xScfy+wlDzZZo6Sn1r46L/JcIXz9cW84isZ5CxyFuUoSHiQZY3q0tN9NmnfAvwXj+z2ZJJ8vlB28AvTB

l/6eXyv14gs9EDJbURtBTM8bkl/si+AZzR7OyN1ykc2u4sRqXc98RPmt9LC4p7An7W86oZdItDWsuTTZ

1QZqCyHGqKhXEH83HhacNR7vOW33A02bwsbXTIy7vW
FzPTHBraKtB7IQiU3jruocwU6+ZeddBh35k1/S7/rWSPu+fVPYodYR39eBcarfhfHndL8+ahW+h6WPK2

UWVNqajRcXIGAqUSso7C0bz3eWKv3nZ0l9Q+NTXzCZXg0jXWHUjMC3tUsHhSh9Oar59z57GFo/t55Fd/

WLAqilNMzkLT3DiPt5QUYvtinBzjKTn0mx/tjvColt
8G9zdpqlJOsgN60RJYFTjRclT70H9K6xSZvDljQ+GrPn+7GQruXBMNSy6E9STj6/DJIL+0fvJhfnRWjC

DSlUr4G+MQFtGcJwNvSKhIwaZEaO1jA5zpDP9f2dpIwv0cHEykl7cNkvf3AurMliSrrS7A9pSri7+4Xk

hqIRdH0UaqZeaqFJL5nQPwLZ5wn7Cr7VnkFGPpZ14j
y7NjuUYKJ52FO4NXQkTvhAdVHE4chklrKtmY0frjHo4RGzTr3IYcPO+dFzXWG1T/+aCIowPPr+yZaw+f

nNxpXDp3RgovEU7aZZyXuA+RUAPf1u4ycCQVMlIQV8TzmPJxsUf8Wf792E+sLV+zaDr+2bwjqRqA+i9u

YNlaIfUogcEYwx8ANJOInI7ADsCVgcJua1aXFmAWm2
zJ6LHHohgYbYjiRTW+5CRISj22FetU8vu1l8NAj//+wQcZ0In2j2CSWHBd6xbvZND3v+grV4Z4Lf9mjI

R3eLWVLRyYblCpfof3Il5MWERXq9S+PYu+oEkCqh65/geNUHPq35Ydz6k3qf1kjxKkkDTX4nHGCwAsJl

WtOdmzzuLP5eXFm2lBfXcifO4ImzSgduWay2WuUVKN
/+xz1yDfekUqpRcexrcWTvEkMFrutne/MCKnANZx8cnycTcld+8PWKmrqxfDX66W2wPbAU3t7Lj2SdZ0

pKzapOPEgD536d94jUCeMg60P0P69UdFkXRa/I3BJALGAuSWld5LJflIvpYWXxKGFGnwr9B/LjSXXNGM

JEqx9snjN8pp5PSshjTeYnNSp5HZ8+Ol34TDqUKJTe
A203Fxj0VdfEibx9QQZfNM/XqfUOkPqFMYt/gQaBjBpSkeT6BWkhCxBg0AIh5EkBu73QvD+tb+CA6sPV

r1Rrz4EvSS/+1p1G7mOQ5+YmUQJl9X1XNNJYi42vmkG/j/RAYGr0tRkFfhDEZ0Uu7iUHiYiDcJEbWQUd

4gTtQ+9Hf+Fv5lxnRQn0QEwez6qJARpIfgC8N/LaGh
ZdYHw1zRj/sZyrS5vrpASl5XEQFw80Ak1gMkhbqIeZNM2xgLy+WhjQ5m7UEd8kBqmaknTCEor8E2LGk5

Xouqotce4BVpmIl5qp9kBweX4TsNUxVSvBL5wucJF809InKgMo8SNunk/fXJoYSFMNHnkV4RRvE3GgP0

HOJwFahawcK5ink5OGNRMRefiErl0ZhMekrTB+CtI8
1lYD7wIHX8KSFUq0dlbtxIuTN3oWlJa+5ayPe8LMnV2/21DthYFyARNNXd5egMF+l3/D/aeUJPN96fnn

4RKXfDLffQRSSzrpylU5K/nEOmUcW3hj79UOPunpSmjijCXNisW+qXuCQeKPzHwTqonQm6gb60Wdu4Ed

T9qfLmyx9YrECYw4BD+j2D1TbFef8wl1ZO8BmFrHd4
CltvGcvIAZjBJUjROqbYehukTy+7Ycnp/WqzpK9G0I59u/6quP2zt3PYFuBXksk6Nu9EPfzHL9WNz/Zg

dKERKguRK+6vFfYqkCcvVwPu+mekPGaRnKAoYZ06PxaOdwxj1SIp0Rv5lETj0xhbPqHSV01g5nWTBjV/

kONHabi++7IeLjBQn5rPic3GIN8IPMUe0smM1cZiwg
wKuxSAc+sN6brvrT09ARsks7pjU0RZIuQGGSUq4HKV5WQrI3LKGMBopQZD3fwWvDR7u7c2aq06eIde5R

XHoc+USjuLKZK6IsOXp0N4/auP/GJdDL7xx8lOjPhtiQmmc7FaD7bMSCT3S16qCho+ELS6k+awH1PEmZ

C4QQWvvrUYkHqJd7nUu7czNSMuBFqlOvdiKRBycDxJ
4OTVCxruqj5CoT8QP9MVXnr8uF+30w4ahUj9/3p2atTy+rTT5IamVUyQ7TzrEJh2oWSbOm7OFO7S24TT

Gu50xjLYwif8am3BYqE1vMMZlqPe7gRm+6rn1Ra8U4Qx4R5GWwHq7MdBqQANnPo+wXCvoJkk7hvd8lhh

V0shRUuDWc7BvKAQ66yG45rpLZnt6naUTLEcLLpvuM
1jb5ddCJyLnvstJFK8TLlZL2aVm/wAKOst8dsYn1fEtcOhboL6kLa+A3R5hNOxATGR4QCuCiBINpzSpA

6iQpRMvhySu5DvTrvXxkF3+G6WQ6Hmvq8L+E+xyaRMQckzFof5fuD1H8I+KmBh8skz73+EqiWwMbeCCx

5BfcmZEYfFqDEpC0uqODSF5pccDnGQFtrBtFGy7Ekh
oL+JGfGZshjzitjtWAzMCa055hwyjb/kmeELPmDJ2i5Csqe7Pe1iG82TTb5ptVlQLj/ugvR6OrW15r1D

oFOppXlUSlSb5xAe+5uFe+HAL8qyxi2PJkidTSEUYrVlQymSU1CK+ah1CLdvoTruuZ9ctlQGwC4Y4fOP

lgenqSnXbm8+17g+7opXzVs8t2b1EWICoDB5U63hqL
S1x9mTmeCfXFhRkJQxbYyHTS2N1ScTnplQP2oFuVyoR6uSGrVJ5MD/Z2pJ9L/wPyG+BQxG3ceaQT9fPf

c4FieuCFEWo8LOUL/eBJsINRut9e24+/iSvoXbEDd6XyGDgeUNCx350k6bDL0p0jH4g1Bldi8XLAlbLT

ffn5ym9C+ux/tYJ9v6wwt7H+UOofMqCVV2o0fOhFQb
gHwwbavjAB6O2J71mDClEdsWB4UMJG3h/U1JAAhEv/budQYLWxgLRdIRxAxv7mjvnMrIf9j+rZ0lCoCZ

fqIlsmV1CycJdapbRxQ/B/xtDCfKSqSDidBZYGHufyQHFQDsTokf7Nh//nxV7W5tlDpn5sEcdTkDgT86

7wFQuP3w/usWy0o17iDx7qPHO09SolpqKxKwDmgxLO
jBJmR1DOUZmq7Hm6Ucscl3eRvwALpRrUZ0Z3q1u1v/qif//wtdSsJojs18mFra9UFo2NRyu2HpDm82hx

TGGCCMA/zfP///DeEEVKjldBUSEiomaLDGPr2JN+HFu/9p1YOaclaDvTUGtKISjCr2VpIRZBFcXODMSL

8mO8KlsIiNd++eXmQe4TytjjdjAw2P/V8jUjz/vdkT
8/kmV6/mQPWuu7Xwl3/IYsRHfYrtb8rq4fc/thewbe5xd0+HjNJW1aC2MgJxgzm2oQTKHl4CqXK6cm+b

fv7/OnyDjYeTKyX3ls74JkuZI/aGJrnDwWGXuTgz1rtqTL/RU8g+FeF6MW0dDkOeQv6Z54Ivwk/qF+NU

7iDVNoGbgKAoE/4wsNSyXd20lL2DZ5H4NWCCcUBqCT
Vtyd3+ICcQjIFoKB/o+JKpkvoYktgPu2f83O8lMUMJtFW66o5LwE2P6S0yhcpautvKX3wNU9cA3sA8G6

6hxG1b2E+m74pvVVw4HSA3gGocBwNyKnPX0ld210/+ci+7/+BKntgkoBpKWFv/93f295TdoKIoB6mDSJ

fAGmyEtFW6OVrV4ihx0wB33HmmRBI1V0WX1pn1bIW2
3geqXgVwW82twJvDGFKGRKN+oCcAdt+e7QpmiASvEUcFglPwghqUopM4ycu/nkBYQQkhSkmrqaOjMYXx

+dpCkzxpY1bf0I+tRMw9H36otHAUIgRsZBju7m5UEIEvYVyWa29mPgcyhQVfHHx5afmu8FtWMaYS7qiB

TF3q1GabkyH/ddNX8+MAvrHfYdA9QHRekwuLwEADmg
ep+ei1w48Hua4VuLkYti1JOVHaa4qZH0ZGCdbGpHqNuB6qV2NbyejRtLJHuinHUMJnlaPS/bYEuw8K79

p63tb1/WCVLLi4X8VnnwLec+jHeo/Ft5HAUl/MdzFas6ZpEcYcxb8ur/GMD/7zX5AyFdTSAsbIbVGfEc

y+MYMz7t0m9yla2rtKgbtJ+xabuegAPvSnojLRLBrZ
4CWY+k54maVs9IdMYgTeIy/EcCQH4kMaAcbpgahGcqvny0qMy09IbWfXqSgmhcQDlN/iUcdPdF0f8zt/

vEww9l3btoeLq73MT4BwEfCY3FQagAu2IObD8O8Fk8H+O57s7Xl4RHs/obZHbuTIf/jbHvnXGBa5I0WM

M/ZcOANkicDU+0db5qkryZ7XQVvBizyi1C8eNfHtbB
2QNG9hYQMNONLlV9K9vl6NB0mJ3NTTvTrW3vibqsnpbDBingXP1cZ0Zry+IAScmSErnfD95esY7vhhoL

NbMX9+svUEMPQhRVOg14ZusAydQZjMXzC+Mic9CJ11VCHCQ5RmwjIPTcR5lmBO6Pm5qRgjx/Pqaq/HWY

RktDYrBvD+lPTBMlECyLoTcu1sqa1+3a0zC51pHI2f
o6zA9NE1ZZ1d+RRViV40Mlk+zheMu+TFDBWCfhRva+IwQQiH9Cu9fQolT2uEwFbWVr0DL2PfCPJ5fKaG

8CVZCrmSJjEOCumVk56CBA+w+fWb64wvswhf4EshW4qJgrhJVweGajjA7WmEamOfFy1vjHetKG6ZK9Np

Rt7TxZjGNMMoEpb0TCrWoulqdxMOzj9WNBFM2gbbrP
CQLUnJcuxl+/J+fPoG3HeCD6rOOZJ6vwpjCmAEa93pxfm3Nf3Z0WlWxPzlHOIdLSBkNSTFd1uXnkB3ne

QVLQfxF4Szm/PI1eorhYKyRFSLeQpf8RnJ3r0cP4xVidBmkvG3yUK/F1U29tob9Q6GTSHGNN7n5SMmnH

Za0ibLmc7oEehkhV3JtGYuHFcu97u/c7GGDDvs2qKP
ehuSbJ3Na9LeLSR9uIaZLPmR4kuxZFEvefig3TX18LatOJP9BeHGPvVm8goLtITJkVIe9/TSuIFdaorS

l1V2yU7v1D/Ye9G4axpNLd9puwCShA/M1izEaqvIQlzKcz5aAAPTLFZ0XqrgW/QQ433JLjTCQIDp0xoO

92yMe6atV+DNaLN36mRsrd9fRT46lQmERmmILn7GnA
RNVRQcgLd69TlWsE0K0IYHUgwUmQl8oVEn8+NknuWTzAQyvuAk0EV8zaMPb5hK9hpKQEgUZ62OcOKRlT

qIuDaH1/iM3kKT+NAbdkVNYuQSZPLF5juNHFLz2Cn2j8WUa5bKOp0P/vqoWU9/29WK0BzbwhCfSPGbFf

gaSmomrm+Ze6kxvFoGxRNsdqe2qwkyWNWAk0LyBanv
aQ7bZb9Y1MC3K9M3+ZIWy5kms7/K4ApqIgI/vRfJxkvAzaYSzpFEyuVKJm0jhxoAyB4ps7OWn998uCnE

b0Sr/P7Rl0O9XutPRgyKVe1BQ/9Hdyc7DyILhM+q8VNBEBzyoIYGL0ge6t/STUM3DtVJpt5s3jT+k2dd

m8oB9s/Q1JzEZuOCMwflfqKej/bnLsWoebRGc5BQ9I
i8hXiGvt9U5LgRn2DQx+yifBUA6LSgsI3Bx4nxuVObR6aaDZA0+g2BBhUxwMp8U2F56MWe+B6AZaawDm

V6zAZWLo/0X6M7qhEe9TDCsF5qYCPH4mcPsXpUlEaAVnEBCgEpZv1e04vjEekxl0RS9mfe9cUDZFQy5D

B66JWPjgle5zPt20ZDRq4+AT5m/p703ZEKH11Da9uh
pD8cku6Z23ls6ViPcyJASmDFFxyage6ttdpPAYFWKwG65JzrlacGQm5/iRk0jwOp3dGsHE/94qCPPWmD

RB1uRD0KEEzg8ZJ6buq1cDx6yof7aX04J/eQcHq9iikdlM3q8bAGjYP04lSXESjXh9ZqpAhs6SNlroPD

CVZyLNrBf8dLJBBa8s+o1EJEduZ/zB431DGMewuEO1
FFOqv0nhHwKGUw+sy5XnFx3CXUYmJvLF97vpc1K4n/G+DuktdIh/tiVSvSGlXowXDqopfCvC6M2G+0Cl

JTXp3Jw0n9c0CPiHAFSSsSWQ40V7s3YOlqkDiEOQNoUhu/x/jrW7xpyiNs5sS0sd9cegXoLkRJNgBxrv

i/A7vCXr1M0hnjUMLSrghSaos5ltb+LCE/DM/Vf0ff
FC5h0TRsRitGTcVQNDmPHl5wnqtuiKcH8asIeu5QzS40oHd2pCGvDW+uTdiHfG+O5Qdrs7g4Slj3+Gsv

0tVOUHSETdM/Dcmem577PhSaIJRKV9Z236GCzXhXZFjDn9WWAy0i1vSyaVBPNajaeBCMyXTHlMbFNDB7

jT54CCD7qSl900wD66q2lrnNWF0PR+rjPEw2j+cIBv
6Ks4mqq8kYrww7RF3kOVDjz6p26Xe+Zep1T1qI/1BK3DIYT+x75B/9duY0hSp1/F8xqa9rhcy0VO1dLA

RpDjht4NBTqLnWrUL6PzMc4t1tknL0hD4sp0aLMmbWt6QtSOm/Exax/T9boVh0RWUGo78wvawZiChu2B

vM1+fPPbv81ZXjeoTY5nudaLyDyrW7v82XrHql1zbo
9f5SmSwTBaZDkfs7k/3qjpNLU8a7k3WUPQgjPI4vCbmlcvBQYMhBTZFPim5WyM4KS0UY6N/HV7rcv/00

8ZgOu4egG3jT/3Yejjb2LlUJVYXpcYBGi/Ijdq1lP+/AaTEXD4plXtx4SbjT5B22Ozzc5cl3xxEomnIX

ge8t8doPhbg4ykET+VjmzgAIUOxFwGfNsBNYJ0QmkA
iDFr8190iroUTVCoqFgjM5l3LWtM6a8Jne9o1swOFYRzh22/goKnDjEeiIn36vKKI42MVFP/y9ePoE+H

cmyvWSfO8IJNcmlr0yNyWHputsScv/dtB0ioSFHIm55zuPG0hAF76UXIZOcAMTzIzx8G5Z+EO3+eLQ7b

HPph1oiWsZduHucuVXc+gmXsZzb4toG/0OuGlI7j5F
Hl+JpbhCUzqAK1Q/GnHEhEJJiWr7Y5QKCWVq+EXSCXf5cOd9RShBoAl8P9ToDto7vst+PQvVHyMjmVgM

/nCxarfA2M/KFvGC6B6xLdEaoerWRZDxNUVS0VFTfiO6Gpd+O/Q83wRXOD/gNwv/Cgd+PAtBz6IQo4L4

umBu2e18VyIzuAYMMzwm9kYd/OQAKlJMW5VvZOEDzj
hce6uPqNBeentSDuAxmiPrtQvv5N+qY8hDYLjbNwno9ZeRBKq9gZErlbRRuJQYwvbUAB/3F6gJtbPZLj

1jTg2A7zTs2jlsTFoNEjKVaRURq7cpz64qJisifkDbx8LV762KNqVT7tG1jX/bpGoqgd6U+10kr1/TA3

Bo7M8C+lWCwbhhVXNXQ4BtBdEO8oVMIU9adyjtwvv7
vxgkNTZZutbTN37rJVsAC34SGjlZ19wrFaxiT0hCpkkjMjJBM1a0N6Jscjp286hoBDQlXajwdokfYiQO

zJm6buX6AqvYLGgwskkSTzwr4IpR3FBYc7QA6RLHw9epB8AA7DDwwvb9DGePt2GI6DuWuYsms80Phtv2

PpncWeT0L9dzQVk6F6RumulxYaPvTUW1tbPKVb3qVf
Cp2B3/fQ8XPJuyD3eu4Lqyl7IlUZz+gzMIAo6/i1Y9jQuLXzhPkiqhG7ZmXbxoxXDMtC9m3RthVO853B

uUY+bMpjQH4r2vkavntLl6kjm41IfEwBy00sdubdJF2Iai74q94b40WdAJ1HpdU5IRVQxSpd8yQKQ2jI

lXuCLsCxekQ/hT8EeIe6Ow85FQIIDbTvk8hmcnR1hd
LI+q+F7CXwbH0eoj9Mu+Gmdk4LZtSWoY3r/fbsz0qLAUXu8WNHdXKNTrzEsi7YOQnkdqcmAKL7WzQm0G

bYrqE0hIOdwj7lpsadjHV83AYuzefbA33ZvvqSmeR6WFE5p64rMnwMcl0PY8RJR9AN6aSPLt1AQGoMaC

SrrLWP75EjKfcg344Wrl7Ehq1dglzevhJVNY83u0wB
3Wf+YCdEnNhyh7m6D1WwjqyjLTdFAp2OqPm5g4lBL5ZSEeD6BDAPkQQ/WEZfVHeiJ+xg4K7DeJD9EfJ7

yAlNsjYbw/IxbXIwlg1h/i4Vbb66kdqFzrjlnN/U1wVOkMoqPFnhCL6K/cLQs+0fkat2JXx7C4PeCP0r

56ZK2RJpzVZE5J/oM1Q5q7KyeBm6oBBaX7UhTm1zMb
+UKlk8pqyOKNafh/mvDJHdce1XdcPkJSgU8swp5nC/yeG3GoovDUl/peWNpgb625VLfV1H7+c0Jhlr0i

J/0ocXoms/FjZsHYbsh7rPOvSLIeUJPMQzitylFpgSuUNUliBOwT2tz7QLNFci7SwHWSIA/NeAeP4J/A

TnuUzQpCMTlOF7oWzEoO0jII6haG6j7NKCscaswYSL
uglMDqpSazZQdm6BhiDsm4c8QBcnSIgI6xPfJueiH4ftBBl21inngvKr+T6lAzAbj+ypLKUIOVS/33fW

w2Qpzn1p06GT9mrnvskPaWlxWm5rFE1QKzSdTc2mTzhBouQki6flY6ebT0zOh4LWuoIr/q7vF/IcrHlz

Z7CdyJVIuZcWyPMrutluoutV/i8O8IaVTFekXV5TjO
ANllTGFPANJJT++jR200Gq8Mr7v2u9pK2FE7oLhlv5uf/dlDzBIAfuvznqaVK6aehc+hY7moS4IDN3K0

f3DunOjErd40c17u6+TDWVbeablG69TUrl7219M2hQ3+gJBX1h95vtWMWa8u65+A076ZWP83we9S2++D

1s9x10Suip7NgFh9wmC3BUum4aTqeidCiJ2mu9KLCX
paZ3ufFXTRvXYaEOUmeQ+1mwD+C5ApDCnw4U7DngvDWvfuR3dB/ha1+m59z/BSBF8dr2UvqNA473R6di

NfhyWgl0OHrQdPhXkWDT/pbSt0mhIg6Q1uBm6rRjyZ3OdYzyLcUqYmfvnO0EkuwrbOK1z66rycE56t2/

koMuELCcRtSQHdrYPdCc2tKiEVXAiEW28/llJxyJbz
Q+igH4DeKQNcsnLX+KegQz4FVMoWPh8yHJGDyqLgtUfjgS4syjBs89SKoMUM7AYGNF561KlQwWaGn7ZC

C6H/NJZYNugIjJoYiCSqIrtD7GW/IYcKbIHYENWJFkG3GBXBn3mikD/1IU2uZyQEiSpDSsSRuncKXf3d

Y9MocJjz/elue/2y3zgjiszA3j+5JKfZLqnlBtrs2f
J2d3W6Q6SatduO4oRtGGNdQcuKcjBDiqUdIs57h1oSMGG1Ert/zKh3eoyeo2AJbJ9B3wflZPLBFqC2g7

WFL/wezK9U08Uy3uBcWEzcG3g0FIzm4NC6l7qbxbwFE0OPYlMzbHIGSNgN7VHQFhFUX0A0uAPy/XQUJv

K8VIgvJMVCJ0hbQnczYfE/2Gr8S7Bn5NixE4rYBmrH
qiyLv6Wm71wccZQ+qWsTrvLIGHJdhef/mdL9nJnUI+ealfXTPvHG5OnGDdtoXVkBKCx0gshA6w8JKiBl

X8Ay4B31pMBl9+/6TNDti9OO3qcDa2j+4ES6uoee/s8Q/23EHBijgpAFYJtZjUuHA1NtfTtK759sqVxz

hdriohaip1b2GwpVzg2iwZwExgh0Y1pPCUZYFst7Ah
J7sD1m8yQtmq4AdgCAUySO12oGaaxQB8fCOD3/H2qcXAoLJ0uKpirgEt+k4OfgjlQkk+SBGPmyJGX2mX

m1U24vp2Gppa92stZ/yuPrKUMe2s0l7aYxCIYx6KhQC2SLvoJXG0EaAqX3t6/+Vr1mqpe92gi9IAfDZ2

BfrmffzPqKP6kyai4saOP5wtNLzfgm2BxkQjkdLwNa
eSbQvjSyqZZ1/icjQPLVV/g7ZG0QqGE6KialcwPTOYUN6068mi7eMGc8KW1JsaJHz/5/U9aWonT1cvdj

nDdM96BMmtu4OkT9ocr9ibmH0jSTp4Z/zkmDchfpcHz13I59lWgUwTdM24jQw8NoMQX18gjxJtvbAUrD

o+aLMUqdZuZjRqkjermyWsn9dgyAJgZeVsRtukUeYT
W5Eb4qD04sltmPkGOjqp2r9I/pfs9T+O+KXx6hbikOQYVTr6+kW3h39RN81Dq0vAVH37DN+wk0Tishac

3/ivF4WW9/qA7ZTqXlsfKAHRnX0xm/363EUNnyXubn+xJk18kkXd8vlF+aXiYVNldP7X+W1arRXuxzmm

In63MGU0ksKPlDwG9AANs8bH/0zXCK0g4+/O3L7QoS
kTqzRBJAZNtRwnVC5uze0cCXN/+DzreyD/uFhFizfqGrb2qu/VwZdQx1mSqmL2Su7ISuVv5yJvins78U

j5prtnW73uxf/4ER1HImbljW0mq8vEyNuI88+8imVN47gLpmEaBV6MJsxdcSWdZRJVvrLtHug0KFepB/

Wlj/X/hf+W9GatlzzftsCiqZKUcBUxU5qQinoWcyo4
xcmJwN+ASm3Rx5iXN9/C/wYvyVoHfxZQQ2vs2Bu11Ey8HtekKoPoi6qEtsSAm0nDQVW0syHESgNlGmbj

FCo21oQkGD1dXbDBx14vJ4gxtrtUhGxJCyyJkd+916xicgJzeJ00wsofpxYgUw0nduEmgkZ7OY10y/UF

YewCLMLSvuEcS11doDDFwXded2JthFfNflay9Mq1uQ
f1lL2E0zRZte13GORoNhCG75K93LDOnQEgNQ6YQ8dXvDpomUtYmdiNB8yrLk1snDWNamL3jF0EVRYvjO

DYsmoJ8fr2N5KxpBegKxxy68SIP9dmIoDpWI4iOnJC1mOsmX1xvCisj48UNcbspJT6lRETEA7za3XxB3

Mbr0vPLJgZvnG0hNFAzTuxc6PyIIIEjrx8Mb4ZJTPR
gbE8D9sUIOvF7fzu/KAF9B2BXAFaqBij8wcM60CLyvsUaVHgWi2IMKOoYl2fVg6PbYL0j9xQY5r5cwXY

9FP5BpdcikXVgHHu9BeWiw6i2jbURAPgQe5rtEpaHfEEJAPie76rOIlVkbdJVD1Ot/Q8DOk3nXDJ+PX/

gRN2QSx4Q4F2CPGXhiTO15PqnCIuA+vzLvZMAouAIe
FuFmoY6cISZR/zjR/h5JK0wUXPfsu3t5Z1CgAWCfhBG+gKCXPnGMdytfPXmgJgfUPNMxKHzOQzOqU5Uy

tG7qK7286MLToEdeu+r5kxyDR7S/tzS71aTBhQ27SNXLj26lJrtvowwOWRGBfyfEvrsndamlK/ACCQJG

Ff8Lk5x/3Y1JF9EWUeCSZCFP0q7fOjp+7cB8o9xLRy
XNkTzWPY81fKlGQ0Hc6YjxPIaLFW/OggmLetqw6suJv6L+IlBaMigxGSd9JimtLzwNF93QwijeX9AxjQ

mfNKJ735t9yrqG6hZXqxMstmqjlEHl/Y+jo3kYzW15CCPq6qks/RFus7oZnQx0Nm5amCQH1fLOLMthQ0

aCvMhDtcr8Q+pG4anqNsEOJ2DG1EzWcrdtHsIWw3Cu
O6gDFAWnp4m2XoQr87XbvvS9ttFah1e9nYrpOcRQpJ1LHz4wKjevCzx2Mtv8/OLYqLMNyR+bk6+8sbzy

jdeDRAfAZG1C9dN+DQDIrny1NKndKnG1Qjg2cF7a7GB4Y/oZTbVE5Ib52LuBhU/ZUt9xgRyC96eHsfJL

xRFDy5GrbhrFT4MNHFp6qFqynvnFm4FZpK1hAp2z8o
Lyb781sMOvxWvuA07B5ta/ctEeS7iO6LlxuEhEIRgSTiKBczF0aeYFAnPMRYW4cn4Z5qBL5ZUACOWVCQ

+HaY+FZO59mE6M6gdnO9ONUbYF5D0dOmwTIwkjNwAkuOrgvkK2RAmuyiCBslY7eel39SEMnv/TaQDy6b

YHYE6x4dDkEHL7L3WdxzsoUbIcAoQRSJmR3c9k4Izd
ad+Q2MoOaz2oguYt+mhSvl8MYEwYC+B9vYj8e3ReFqtx3jiLMmhgoKbjcvaoZztBKUstffpMOWq+7uhJ

6gqQRoYmKWdl2/SK/SvyA8Npa33cKR8uV0kkN2AlEAqld/hpAhWmVkBf4i1XrGqQNZwHpAv501Fqswjh

BfnpHnIXzIQI4Vt8I7nmz5dE61x0t86VhR13mtXpax
U/pQFndMXmz1alPhTRho29OlyOe6ZeMXpmpR/fQ2zhz5JmQwUQA47azV8PKSeNCx8S7OmPNGP6PLWTLu

t9dq6T+bwLDhwc3RHkhXGdhU7CStmkl9DLURolAPdnSTIzSRfPJzBmdbSN/KIvbRZ9KmDwntUrCMpZ1j

BPpXszYJdCxu24uemPHP+IzRnXyAJf2Rd1j9Wb/SXj
nUtN4bsBAOhRtFFVHXwDDR2wibxfwMqVAr7W1L3bL6zDjcmAcG5AQTnb3kNTU0sJUmNdyBOhAS5xf4mU

p97vGRLtPWjBTKBEDga0xK1BVSTmoUQFgpAiamHG8cbSgNr1GeFWK1YB1cEvcUusMdlxknkr/0uL/glA

MBu6vUtVOH6u8gU4vvZuz4pcEIiMIfCkhdeSHJ9TrL
UrSnR+hpas8mE4I9y+xOgDHCkAqnBCHGtAoc+07TsrBEDo+6X3yg7PqkttnlwAq2M4mK/3wBF0ZLOQhp

/FZZc0PE/1AKvhuQLqJs8MIqoQcW5rYVK8eyZ/O5gOgZT0mEqpDkiFEaufwfT2M9YVj9XAHgMEhqWK29

MxoAb38eFKsUKXFpYIfeOxALjZde4hpTph7mAkX7Jc
iTpETx7xrkBHGDDOhQncv49kExsCGt8r1zINUKetA+z0dkT0J4A/vDk9w0gUyKzMlbBfmUvrhizvcWeo

06gfqLjlFH8dqSz/bmvQbEIIp3+b3HjB7DZ5JPG4Zc12TcdThW39yq8x/Njq7xDUOgslVk9UDZaVlaVT

kCucnuTQ1RqljtLu6W0/W2AghAVrQQiglhZ/rwB33e
+vuIlPNbQVO6xZY+yXp06ht2gzVfaUjxiiwoL/JuXoff2YHO8/e8h2woicbtygh/4z8c3S6b/3lcld2c

xisDCfdJ2UPvQmyoYOrodTNOMFp90LoryhyJOCsfn0YHU3LmAKi2z+8C/gsyI/uQ6d2Mf/G/B3XLfEBz

PC6tYyHPEWDOhY11UiS4XpuBTZmsBmvc7kUF/XgiyU
a0kgVcm0wS14emOuE4CTF8odb4g7AGL6S5Jm+M88T4ITFJ7hdiC3jx+3xgZPzBxEBjjuRozgkR0FtzUP

Whgv6cYP51haZEcb1s0G3xp5gefyCaNBCw5STdryJwb45vlh0LWBe0L34pI92IBPRNuvBJGb7/gyD/9c

+Ch3ShGOKdF1LBaBXP7iBM5SVKhadHqUK1hiaJxR1/
UB48Xq50JkYfuUyJETKZYjdONbGcNn+6KAcsvLoRdlAoqsWWPO/kM/KjtQgaWaMmA+iA8mcWfupcKJ+r

HM6CavNygfai75iRPjptTZyg0zZmB5Bn4JUakrGAlBxIA1EJ354OWQU6iGl4PXdZjUJFmdjY2dDF/Umw

jTCw5/b5100ite8dVDMKl1cBMmT3xhhZ27VvqPuo+L
uJHQn8CgQLoDOv9guk+KV8bktWrlDf4p9lslcV0+jAn6Rk29le3fVWwKKvwQlWOegas5yI+Hxkz3f1YC

5rI41dET4b+v3Wp16GL603m+uE9cVysum7hfmgWO1n+Iyik2TQzGWW3bZYkp8y49etYOlI13TsrUxxGb

BtbRVn/ag6cuepKcRPld7Z7Cq55YbgztpfmdnqTweO
AAeg4MdToyxaBXi7jP2g2AlqwqySarqmgRCozfnNOXCfC1fb+V/ANi/iEDt3Q9bZ2BNsJmQgJAsg2LHc

es2uBDEBAxghJ7gFCniPA1om1FHRvGrWx5l9OC/OGDv/VDGJa/ycUghSBgdlqOr4DKquTAtSVC3V6jDp

/f8PxaexWu/LFekqdyHB7Do296528vijj0fveW6F9D
/cVtsujfUDMGABBoc05edKLmVQSkthFssbirefDKzKaNytSdZ7a71ATv+cUW2BXRgy8WqUiDHiTY02Za

zOiPdo+SXkV3qirN9GHDkVfVrYYB0hQtoARY/485QM5YH4kxw7aet9FiE+NU632bB1Hr6QUZhcPcnwR2

z26ABC8Z9z6Z8dBzmgFh9iSx1oj2ZgWtM4liMcOV7V
/hmbe1wmwz07B0SYKesCI8cxv/8SiEHeOtARG1onFaqD5MNYnUCCOdZi+577/A4j3sptMjvWiZSH5vUd

KeVFDpfBp1NmmRY974crnOPrGJX5C3y/jq7v4x3MWsMDPp5szHxDL7yqFQ+BGmM/KujXcKjyTHGE4QVB

Nc9T/onr0UtKmRhd/2rMR+Fi9QKfypK1iUhZnXF+pE
egN7y4sCGpTS/DCMwZhUYEjDQNr3VR0kSQBr1eWBv1Ule9btbvD90s81b6BFcvOglyarssUjocq3GtGp

Hr6ntud2dl6T94bnpuwaLGCn3bwZf4LMMm202R3184h/27/+eRV8kjq0a7/+pH6HRv7XxZfdWZBhxvJg

kdkB/UMYq12ppz2DZrD/6kiquEKknf+UelXR5+cYt6
29DJrNLfRHXkKpYYedTeNuhcfJLvDkaLrbtIdvXCSemzgwwDY9waFPNjv+Oc6zPX8n2ZcFwMeY11FH9O

WhyHsNbx361To5s43ZzYAsUSNzPaqQSHe1sm9Sna+tQ7k6JiR2DQ0Z1YaQr7kpBWSSYTW5PJCFmrwbEw

Zs7Qnv0y2n+ZHnPO3cUzgk2RXvcxWROhXRVZIt/GDb
MpOBx8QuMqiC4S/nuJvKHFp7JQ351pPgYevNboPd6Zpd1E9zzUk2rqw4pewAL8o1Ky7D9czewlLxoQTl

YXBvNKsIZt53Iqm+EbrvUv5q0xplNd+N5eVK9srxitb4O1rf9BJ8MBRM0Nbncz+dX/uSuqUWMXnp0fhM

75Ugb0eejSfZS0eI+NFaNtI0louJuMZXclD8Ia4Rmw
ikpJrMLXWXpVT3OZwbfmWVghaahF185ADtp/3i7pGJbNRxeMvRRF3AYwNbIHi6et6I0NmPslqUM2BPDz

G3qy9s7Ymzs7K8nksj2iE2zoeoDESUhe8h4RM2qTyvVp2/gMutXuxvB1ekqYClSznh344VYlu444/Lpp

3t4lwspJx0arbMHCZSf0pscnhwlqi9FOvdoRlNd/eR
kKF/ClZH4jiVKuRqo87HVekPuTfy3BKyfU95pcRbsgacLR+cv/9LRV6poZqLKbnQM4SNFynagshU7obN

3oTrsH4WQe7OVcgJaOc+0Zo30kiuCik+TVgEjYfpWONw9Ch1o8KeGoh4gb+ex47WggQi1mLjnvgV9wBg

TEFSSu3R62dqOJi+6IgkmuCQMjywmA4I2I7QShvJ4r
Uyz7nEadp3QI873CgGjByNQPfjcxxeLDuiCwWUjZdjxUrD9Y4wQiijtu7sOFppd8aobl50F9T3iJh54l

TlhTBV7RH/nls0VSPD5hPZ/eSl42bL9SQ/B+7iAysvRx8slwKHGnXKrG2UeuI7k+U0fTbYPZ2GoyOLiv

G81ZDuiesgZixCutAWMYeF81IEmRJObDnhsCq2ZTL5
xaLLILYpJpJzb02Z5nHycFR04ZUBwL2/EZlCK9eGTG/7FfVs22udvPQw9M909NeS4naUy84XGH2ZNoPv

eB5KLrNNLgvcDA3MxeqL/M+74NRM9kJ+N+5BO/2TjL/1dTxTsXTUd/qVqX9ns85ksbA4BlXlT0FunLW7

5/pNct3nr72Tk76yiSaBUQFlXKmuzk+7V/WnNqPCR8
tbJPO+gVfujeYHh+G39Wyj42IUEK+4yTbqIX8ggiPPK8BAOuCZIou2Acm+clkUI4DVFkktYcXoS8TilT

w25hqt3cXYcDz4bOwZVEuCsYvTO5GDak+nXZgrAfBQNx1h+zyfvGeENAFBvhTwJwDe+D7rjvh4lZTxAA

uApQluxvT95D+13Wq7m1m9WbhisAkVgdpq5oAYlvRR
ffiu466X3Y760lv01vh++EoXAvFqIKSMAA1hJF04E6fRGRV22lN2OAB/mB1gIPzgkZvb3SjBussSmuAh

rJSh2BP+HbiuYS/JbEd1YfOd6FLW/Fbk5DoRiZkdA9monysxh2SQkeG0nHc2bfjC8WcuudWMQ1OPxlYM

hAj9luUwxyILl4j8Zt/TE1F7Kpu0NciuP1yCN7W5vb
/9d4yUfHmbxQ6FKmbW1kln8xYT1G28LYey2UFcsYc08bjAr7OOnnBMTmz73CXk16QB1RX6u4/ftew379

bcV+K1Km5b8q4uReMIUZYrw2+Gkagw+kfmr34J4q+R9bHEQ0LFkMw4yMAKaUSqYZea0ZYM80+xucoTMf

+iQ795uxtAQPqV5ZE9OJfRdCKyfMZIsJ6QfCouxi1F
5MjK+OObLSPsjqz6f49D8QBAmYmVqyUxYAh0Tq/Za5/8nr6HzgB9MBJvLz3xTokXTa/sVymn64J+uFCX

3yP8GsSF0bD0k6Uk+QKS0PVayD+YypDkk0P+JVzhPSj2XBj7JuuF+HAeaBsgN9E0tSW48g6yNoC6UlYK

oek4YDIjj7h+g5KcApYn24lcTsSHTu/oi/XNgiXNUg
YUa+zGPfnl01+31rkyknZ79pGbbOyylQ2pLj1pm2vNXNdF4rjpCKE6f2C6vWu91/HDGXX/bzADL9ObfX

smY899IaakJvXCCyP9zKRd/TVch85AZ3nkhuLmdNe0uPn3z38PbCtEq1Fc4eRZeo7EAzQIb2SWupma5W

ziWYUUYjR7R2lLnDr5I9/DqyvxBl62RCb4WqfjwXSJ
UeHMRajwZDWwy2hYdjAyx91+i5NxrDCh9orVQ27ugsMtHeIWxOP6FkfP220mY7zzT+1SHXcDcHiUmhmF

9R90kkT7ZKHVy6bva6RXdnl/bOcXT3Rzs2yS0cvrI2Q2J4PryTodJpibM8VOpiTcIGOgLlx6rrVxDpvV

c9kI6TKTS2fZ7eCaV1GhjFTiUWqP6Q/zBPhko+fKuD
WhTLzpnD5+fuVG0KXODgo4GyQS8b6NBIAZ7hPVvL0vX+fssi3nRulnsG3wF5MwbehQ3mQ2xSdxq0OBxx

R3SDmYyQayQPQQHkac5/RxYiwN0dxtbPVovMOlXXDBj8+FZ4zIuJczgfPGKUg8oP1rEyNmb5bzF/qY/f

IAbbkNx1aei6yJxJH9xc8MtIadQgL5hM4mKatrjDt3
w/lxi0MXI7KkeTof7kARUFZy5xooKNmf0saIWMDXTAfU4ZAHiv3tCIeGZYGlC2PT5T4X010UNMG4/I/N

YVhOT99p12fg6xf8YZn8IMh3kHVbsjZxljEvHq+/YTEAziUcU91/0C4f0MmoF96jHBfsTobja45d06wc

hYw3tFMX9H1dBVyosooKRSjFBn96AtzE71CqtXxsBK
myw647X0LwGRA3/hWcheNA7raIxG1OEZaOa7rjlnfL6mWS2BOZvgoBmd5kHZCYuq/mQehc6JJs0MmDLU

pYWM+6eLgTx/iReEP+mRmIe687H33HO++HMr7KMHC3GcU7kKTXNLjTfKtxlyoJwcdj8JnEGgtJC4Oa0Q

UGFQ9yR1tnowI61XZtGjtF3zoiKabXAAvalXNCCGwW
3vemMcU/EgyU2jSo7m5weZ87pVxnMvhlAhEPIcpz4qHNJw2FgFe/YYavOQn0C3DPBYZuRr9f3ofYWeLE

uNdXvBc9RgxmcXJJdfGyBV1JJuDfCHaGVXHI1tEBjySaPHs4IjKm948E2VlTUr9zbT7HLl4xcNH5kBms

1Qdb5taMsxuphZpfR+ABqMcge9upV4IlSwTjy51RWF
v5205T+gKTa+irQBCT5/j7Ne1l4Bj3KcFCZgyYpXrUG2pfo/cxez0ioAPLvxTqzp/33/745c2jNw4gTp

KiZpPwYEoQKnDKfd88NZbZ8wJWGp9QjoBmFdL270ML6XMcz6paq5gik/ecc0d/e6hsi3hn1DcrLh9/H+

cA4xpea6UuwmtWyzirXrdXB3YEWublmZwJhJ8ZMsAQ
/nxHm+E9PUSyy1wDjZcZbDHEOEjN3hx+9YP959ejFzOTZ6c5euSPa0xAnQoOQvway/dYYGF4tz17a66F

44pXPNWU8mVXo80DeokqIaA8Lciza9/J0QKVaKPfy7+8tOJ08cmN9HuwF3jD6QrT0WRQ3gbUSDalfWvP

tUkooR2btqOqUCYGvDb7QL4hrguOtAaP7eewZK0bKP
eP3+5B3TPMNdZLsjFZyVI2B8x3Jfm+Y+K76zTzXyIOpSMeMAfGQ+/uYUrIyZYkIvwZmJC0xC+MumqKe3

nQnFIYKcZmb7P1D0g2XMxDpcCUYN8fuozLfILoa9kPrsyfVuk3RJAG6h1ZJIow86wKWWCK/3maL+DymF

KgcOq0z64OvsZc48xaN6O4wLx/pOS6hsPc8grK48xQ
uEtKEjL5EJIeY0RYTgnEOKvV2K3vZLOaEW0mBbS9Drm1WBHLVg6/LI1rh7EkeOZOlav4HzWnUNBs092+

f9Vf4a8uEyiRd9+W/vppl37WPXGTQmb911jUtDsmTT9e8CKkVMEROriZwYgqX2BH5YHxUj0bkiD5MEY1

qvXazrUtMFmv6zxw8B2oqxBy4RXfNemfDdk0h4qnna
X74F2MNxuLSJP+h456ZvMXCo9eZKKpkDYxcR3JhoAOpR6fF2XmeRbadHwy5w95mSZy412L7nq1bPbOC8

08bRbFQA/NbQuffSnnzrAtVXdr/mSUkM9J7aft5xIWQEHpVjk8MLr93aZcqv6bpG5KGNUqPnF9vYiuTq

Qeqw/iYGOibgGp/nLogTPjVhUgBC9VBg8Xl+8fzYHQ
Qq2n0tSOhPeNktVxx/likD3D5f5NimsLE8Os6nnCIsVm0K40+bXjUneipESt2RP5fpEfTVikZvabf4dP

L/q98C0nAHkOgZ9eY2RcxeCOH/sIG/ben2KWwTu+HfA2vVrewU69hKxdAGwPZfwiUcl2n5Tgh4cqiWLB

/Cebr5sp+dPor6RCuvs19Rc5FsBI8CkpI3GrRur5+h
4WzrWcMeZKSL9rz6drwlbDCZHtspst56M3k2JU7/ZW8csc4HTkCklvEqwGd9oFdrhxWesgQL4UMjfXjF

aAD91g2TVuGtH9AnpRC0lv9rwQNY2TNC34ovseSoYLYa9z1HSFI6svPBrcag6mEFdVnGSmUryhmvtc8Y

cBXGsPGqqz+PGCdmRpgok88Ku+35L2bVM79B1T9DbV
Sm+W3G1kFwIo8MAoIFD6YUZvBeUO4VZ3YRoP1MRfVhj4hIr3JRuUvpKQoBvd87uHl506vwqpbBXW3xSJ

d9lkBQ1q1G6Hcut+hP0AeJOApxZGIU4lityKmiaFX/9N47JXJ99MuuNgO3J6nYtVPo1f3Z9bxJsRdFqL

msA2GCJq9K3Xmj9RSW0y9eq1fL8Y+Ipdk0t38cZ1ff
NX1iCQkd9DHQ8eLPM/yB8WoE4ebL6zpqWDyiFrthIAwvZTluuY7Ssio7yu5Hy1rbnHfnMQoJMWcQGp0W

0ZqQWu3MiebQ6mcxxr0YTz2NIzBz4jmUclpVj0pmZ9wdTCerCW+KNuzjhbYUiYDD27tQJhmSUD6PAju6

fgm66V5iRnXQRzCQ37EcBgmX/lRaQBgpGpe9ojoR8W
fVArjzhbjhmrZa5D86MoHGvCxnd5WbcHGmyeszygQnBX9FIqCwM5D2liyFeFV2mfeG5frUpBn33Y6M19

tCsH6iGfQywoRyXCBQ6zgQOoDlnmAgN7VRolha+CpuO4nSupnXBRHdrxhzdpYmpzwgKhN72KxZrr9R5g

a4yP/Zok2cBcGw17WwroJ4dkqgxi4TmPTPWIg+jvog
v7i4K4b9Augln3MpvuVGmXyjQyU/Ey051ql3Rmgqyywtn4vE0jiFLlDmFqc8xKxFYZSHJFUg0N/0jCm4

2swf47aMLcmu2K1uu8Q6xOUbTBxEaBGnkqrnYzT+pnEjZwuga1PeEMUmP3eHIHg7Gpubt5Ekj7l9hZtl

19a5M4G+31RBUAmo0FguByExV8H98Lpy5d0mBJtCwQ
msUw+Q+2RVvd7jaDnwFY2StrtLzqmj9zQ6Ss8T7PixzonB2JQzg8mT7cm4ERXJu4t7hr9miVdGIA1DUb

tbGz5nZgCkKks0tyBvy+ydrnNYDMCAdol6rtofSw6QA/Vg66QZjFqL5+omXDNAkbaAm2fJtJBUeTVfGt

A5JQcnhLaEQ5SBnzFsz6lKI/q3j/7dpBZsuhkT8q0e
oQFlrW7fQEUA/P/YYcJ6a2gOSB+vYcQxVk5IYDhXtN9jb8JXNuMnQgw+kaCk1HS/7z13IeWtoh+2aJMj

P1F8jT43JKkGDJ85AKJfiTeG6R0gYt9It7Tm1vXCHmL1MNjdXgpNaWIW1+CcNoY+UnR/WueKGFD/GZvJ

zGZBCTbLTQgoXAsJ06qRMwCHCUcvaL+bIjhLjyMZww
kK+klcyhCt6+rBhZc5PxbFQTckDFgvbtm/ULfBPnW4j84uU6e3e6CbkGCe1y6tMzulb9DRZ6KLmwHayZ

77uVFsAmdij++Z3MVvvkP7klnoUHNXBrfWLc5KwHQox5RbgAneLV9csbBV0w4J4zEYXdkInSXGejujxv

v0Nty1A9urSBUBc8ydnFWBrES4oFaYJ+XxTXi3gt5l
gSY7IboFRT94muIZz+gHIMee3p15A3K5XCvIZ3Fu+RbPcld6TQ0E7gw3ZMoSddKfWcDDnSn0tUv9Q4e4

/A7eKrhjQShMd+qsXNi99ifj3bCWYieC26y+giZQ1RosaBU/Io5SaGlzmP4dLrUDXPAJw0DMCTcbRL9S

+3HMgrW4J5gcB6lu6vL1nG86q/kDS3+81P/Zl1rHaW
F42Jv5PcSUPzU9mr4CO+op0GWkRJUKZb/fyDfBM7ds/L4j2nCWiCDY4SEln/9O6Rwb1zmmtrkzayE7+l

9tXoeuNYZtasclQw5ZFd/w3qFkssG8lW9e1e5JSil+b5C5viAd/By2YuiGg97LeZcTh6BV3lmh+Fd+jS

l2L5VovoQ/7CqT33og3INgamIJ3cbgqQPRfAucKhD+
ccX4KOCB2TcZH+Rdu3yqVH5AMUlEfstN6S0L1KA4oloTHWO3RQR2cOwVBm8lE3TsY+7ZVAKMB1qokcR+

Ch72rmQ0V0UF0bENwj/smMpcXKxvZt7oZOEUDQboKJWlIJwhlw2Vs5bHwCw9A6uz1ed6l5ZpIfzu/uHw

22+I80zH+1/+KrzCLGFIGTRRaiVaUI6VUBfobBlRBg
zYuGNAn4KihcdylV8pGEQVDXQfx/aeEAzn+7dHLd6n//SmLVxQaATPSRkpmFUv3bg0ioSGbnnJ58v2eQ

/T+0QGxMm3gh/joo144z6zRUMKV0lEmmYzJN8qQuLxEl4ma8zRBWLX31OkApS1WvQaZZzFAikoPfRI3j

dxNLX9FgvoTgp6ekMQ+nWMlK614/4jDRDIYSyv4bPp
qvb/ygMbkbi7YFR7Dpa8qalKS4Axw14YcqUziyIj2kISoHKnSNRdFel+9HwHY7+SYm4lrt2w2F00rpQJ

DKmhya1k8rylHm+ECAp8v7P0uBIAP79F6V4X2rTiMkHN4wDVaBidhWvVqEWF69wEgk72aXSD8EdD1H/1

7ZDqGl/db3f4GTKP7JSvqbKe18Ym3UFIC3tNblwjkc
glD5ZDbDkf9/gf1/jizn3As6Bm1hZHqTs4GrsL3Ql+RqkKOaangmSvqT0psRNGnQtync6Vbc1JlxE08N

gc2Lb9Rc1zgFzeGmbhmLDENku6djEKAFUIGwkUMckzwUq5quQ/wQfK6FBr2vGd+pVNfIpWS41xt06YW6

ww7PHRjghPX0McX5bG/5AMTxgVwkfOiiU4adlvBPUZ
49a3X8jRgq5uw07/NBziol5AcYTVBTX6PCGZYTH7DWdUJvwC3PXaxJUipVOSc01IYkAXvYhc+y6w/v08

9qF83RoSL9jxYHBZKM13eoSlHmrFSp+L1noQW/uMWA+Lx0E9VyxheXE+Mpkfw0OBU5KOSAnGBw0Dp4o9

BUgNP9IKvx9X2mKLabjbyEQZydQVR8GJvtYp8udItv
Fmjho9tDi+iKvaXH+UHW1x/h94AYOed1zbzRcQ4kOmb98fBjaxri89QGezMKVfcAqkuf9PFS8Y7/0KXe

GZ8whdPypH+v/9wyMzgQ7TxqG48LKyv+Xs0Przh8F7OrZugUFL/q/j//TNz/An4xiFIq2XQf7RSPTxIP

kjRqKDonhZ/qQdCZ64/hpoIBh3pMiC6tgXwB+co3Iy
62Vlos0KXjucS1sedOJ7gQMrrPpsGMQ582Dp5T14cMEQbfYluy8bmiX7VT0Zee4bMG92GxrX5rHoTyUE

bQaUZ1HIZHdBQv1uytrZdgnOpt8ATtSfg2iRmBRn7mo1GhYUShvm3c+W/jknVcoMNh4GED6w3skBaVb/

lCAieN1UIlWvIlpX/ryRW37DjR7jkhmV9cTdAgL6Or
N8F+oVSaE6HF6O+VcSf2oBhzC6CCBTzkHmAeByLQO0A9gGeT0+Aj58IC1NNOTNlS9emxlYMYJcXI61gF

9EK0QiqybIEAfVu6W+rdg43BLPCMTDEl8bUUieINwUOewvPdxygvEdR9MUYCqY5RK3ba3XMNn5HnaQL2

QxFajr3SuaD5lIVIcmOxaLdqIzxauRPy1dAdOoeqlR
GXqT9xzUiP10Ya/u3ShdD3NyIKvFW63w6EiIDb3wHQ9iMcWxCJYX0B7UatctVzWaqEyQVjxLYm80jEN6

YZrE49XOm7lumRAlHZ1HyVy4HherB+JuNPEnDzo40T/5dE5TQYRRpnJW7V9JSUsDkv9IoNBdS/h19j4P

A/KPnSAo9WMr7oBfb24iG6WYmpOcFeQUiPfLjCP9ba
7pqrk2IkbVoAzacdfKXTuMbeHrnvSVr0vb7g11nR72k4G+AUG4cJmDrK8juqRgX8Hugx+cYfp6vupxg+

wmm00OabxcTruRntEAo7Q6U3qnWu/LGeRKteVC1nfy8WPGxQ4QJJfbz+tZI44SeuwzZAfNGa953ZLT2D

Sqyqd5hdFmUldStcRYvRIx1BgdqV69WpWqLRdjUss+
xlg+TzpjLcYPEfOPEJosO6tO9U+dHbLs6fs/r5pL27vOKC7YHruPt6pmUOU8X5Dxz7b/0922MFAOsJtc

sKaWZD+afQFRE0QfUqMvUJ3nXmZBSYxXqm9TTNYOhiJfQFqt1cAMM1vTQReeaHn3DPeXdm6ec9gEMENE

swRF00DosicnBS2cOf1MVtUsS3nk87cUOcQMaUc1S1
1z92yHEAbBceLU8w+WzlPoEtnWEiEdCCZL/tdta2o+OaRXzksrnIgdZFzWSlIOAlrmJqBrz4RPfsRxXF

RjbkYwbEqlO4a/Z2IJK5O1dh9+5P/5XBQepLew/ofZ9WA4jalJcF69rjLOrgbZDYHw1YowZpuhY6FsOv

3pfjCWlbhw6h2vDWsA/0Zaqr5yMisSoF/pF8hs06qB
VyKzILPXV3cLmj46eJN1E7ZyqNTK2EVWYMCE+b8im5bPCW3W+58Hz4qs9+rjhTZmJCf0IlqxszxAM9+N

zB2htVcGd39pwf03FQPGlfW79d+skQqxoEs+O/Ju1Qtxpkvvd42nRfgDO3k3hkwNaNqQXXgm6Doi5YG4

1+5kJakDTsZ1fF5nhOyOHUTH6BuzW/xnGaxGnvuxsV
34CmPL280dbZeO40gnkqtS1IjBC9anAr9kHJYvqahQJd24ksMOIAiIPvCtnO9iEDJFaFOLk1SUMJyFt8

gmM+WK7t1XJHzhuScEgxE7kJcttRjd4DLyyisq7JswhCYEkJnLhqgTearQk6fEcyGNqNh2Eywmff8BJF

WGzqHztetLK5vqdbmtJaSlMxgJYpJOzTfi5bbcAnx2
sw+dXyMmWz4KyxDCrRKD1F0tmq/iPwlut7Umc8169ZrRUAIPDLCHjTRlf9etQyDMJU9nPHHf4/wdVK/t

YfneLQTwxAigeL1eOp7pAVbezk/i5aZH2LcADBk1eKBUxX5xAhOAnJbJCpJP62dEHEQrz4Q6q8jC24K8

w2rJ+K6HVL61PJ+J8pHMo6SiuAeOme8FQx17k0I10F
eV6G+xdX7W/GYoHtC60nLhQXX5fqIRz34feDkoHpr+Nn1t6bOJYP5k6deFI63IrkRsY5EDRoDQ17CHlR

HRdoxns6SGxOwMSXlbJYnzL7/hj/8x5p/kIsIh6eQGcCavZD/yg8b5fXlAQimUDuuUhM6gH8UsUcCvR1

hAqzi70Wi8anykaIR/KNaqk8E5h0ktSGSYTXQQrJxg
E0S/NcZ2QFRotyHb1hDQ37QwiIufuHz7WM+3oC1BSNqnrM6JPDmxz1N+3gvV4OFqSQCiTHyuwOUTsyAn

QWWt5Z5P7DQ5fG7JImAut7wM4gjVWzSrW9q1ztvChaaz14k7U5TjxZlwYViUJrxfKwSDzefYu+EcGVZQ

AYc1EXxKM0MUNUQvCmY3NHYYNt21AglLlx2C16Tf7p
JfTsnNL8pfCCj/pT5u2+NHysQHk4n4BQx8jP4WjeIZKN9+gopwetHItnF7Ih/YCbHFAa4OuRSSL3NWZy

YnXOdtpZlWi3090U4N1VSc3peJMece791+01T5HoO51e56PA/8WuZiZ96yMn8LgTukSOPMNp5DV29fhi

Ud7DfZQ8Jcy0YIX9gujgch6V9vSfSnOxg5Hftu/Karen
4drc/a5ssQv4SaMnRrejBtcjtKxIZEIGFqFfWbUbmiime8cEAFl1MEfAt6HqsS7v5uR2oQd085K/+KLP

pgew7cYsgAy20OZTPhUM/bI0OJylW66cItlRu3qz0kYARo0vzk+3vPSPM27ODoQ9fpXcTFpCR3vPNYWS

esgA8+lxZK98KYBMgEVyZm1/xwK34l2o8dopmTaRvs
Q0Y0xmniQWROY00QcXb0ntp6PbJ3N8cu/Dx/6pelzpmFrr9yFXWU2WXMYv91KS2hYffrucMeV1gpN5AL

JkGtUvUAewJsOyBlFhiAT5Oml0SJyA8VGhk/XI+UE6TmRsJ8bSp9yF4VPLqzUawJXV+8MVE/rUCbBr8u

KN4NJ2zcNV3ti5S+DpcwIYPl7uffANMIj+260Skh9y
1epIto/nJiL31ZQK3RVBX4lvrS+E7dVMY1j/jhBWyZdi4uREfqmMMxKnvu0vEL+j2z0mYz+AQI5/o0HM

dgDks6dfKeaCOzCb8aN8GzFVr2aEnvLrSwlM72LIZ8mUL1CwixPJueHLTWtva7Z+hEUt7evARxEEzq2r

cTIRSCfh2hXVYOARPo0w95D4jkGUa/B6dZhqWwiQlK
YFFc52AeUExx3/ToVt2mzVevyvH2UJeQEfaWc8JKh7NO+kY51UNL6527knw/lEP8AceemfHiszKw94tQ

1BC6lvq0AAy6wTIjBfCDEnjUCmOd877cankZ5BYyyxv4Qm8rVVmhdjArGYhnl6pWqKFLbGEDAQeBGtV6

+xy1c4qMGE7wNgLSe3Ge5eYSQ6KAWtWniUxhwj1oR2
n7BfJILeSDhjgCa7IvmTV5zgj2aOOn003bz34XKdgiTv9nuDGbbYZTp+3iu6irL4SZ4JEqK6GBqlFQv2

Tdripj+ayJ0RCQCR0XBbtFucKt083ZABbQ08xIjVLKI0w1bDI96gdrXrfIC621kMXVZs9GU0ELd2Ffp9

LWC5v7yQkNzgQNIRFDHVIrJOWUfG1djwEnT6agbz6x
ElI5f7P+4ujPX0gHvJPt+CBHpePEECC1IjOudPlR8KDgppDoUdXN8UoQTMRjGxAW0uMoHl+S7GMdsWLr

BzM+IDLH/6UJbgBjP/hQblw03ldOwkl4iBGsa36TByJaFPH3OBKbVdKfCS//zWrqMgMnrYtL35k8gGTF

Tl4JES/xbYixsiSvj5yMf19kgn/G/TprHI3PMXlfUT
5iW4kSlrXSm/Xec1sPhLCpfjOd4dl7eefqSnen2BVZRggs4V5HSGhjzZFDJE/PMV/3rVJNnJjKFibtjI

a4d8WS/mFgKIX6wnTCfDJCg0hjY0LAi0nk9Tjmu/HzAN3La0hanV4agtUy5I2UprHXLqyrFajqPieeSH

3R2zlhozKYk0KZsnHo3Ydg0HOWgv+a0hGx5M7oLVdT
xDkhfrNSu7Iz70XMrm4b91Z/rkXqhXiSDyMOQMyq3VDwRcEVJc4Aez/0bPRKJLEb5I2J7r84WYjiYCC8

wRjYg8Gf8r9FNcXv8TJ0SlSpg2Lugu23moo6f6ToScpcWViA3D+v/d1n6qP1pE2t6prnF094ocTOAr5e

4Q+BP0lTt0OYNwJByRtP0w/+Of8iUY83EnsqAKX/Ga
0gf9d+j0jn6/izz2W7GzmbGb8acBS+sL40updfP+voEt1eQOtpLHzIV3l/GSwmdE2++hqZJwsl8enGqe

A82kGr/YwIhgb2kYugz2hh4TUXZkFz60uWam0kZo3hwVBClOHamER4PRDVmbX3rfiAb2zQtN4H9DJLMu

ZzyaZdpD7IulOU/2vY/xsxkD4WRjhe6asHl3WTGjcW
Qb4R5JOl/miOM/mQH8OtLKl2zmDWPFa8vtfbIPJoEDxX29uHyt5+3HtiEWE1V87USA2GBhlt2SHaSODO

dQnWDM51HvlYio496aRBl3n8wXPTbhCog2FrgbqG4x/OwnKmkjTCLlc4rzbsnXpCHt0HdhiHZsgI3hE+

AOPMdWLDQYYT4WlXV16GFp38gWeOfM7VWJwg1QL4CV
HNGkA4kwkCucN6ldQj6CGH3ldgb73F8QvlpyUu2+rKsIqN42DiqiBpbbIhIvzEux+KK6lzVOCPSCw+60

11bgB6XdlmNZ+M82kcVIqpESsgjrp9bnIynBTB7VzTvwEkBthvEB0LXXfkKEDyMTwgCl1FhZdUcONdpk

MJFGi57Ve/eNBAeFroQEHE3MH9wKaBAmHmbWB/GD7v
8IRrHF7DSYwYzPlLS5NYX15S2zwm6cijSqgUIULPsrkt3ocoGC8l9kCiIeAnMFmQvyYthlwsZ96NSm09

4MK5JvW9iJxHdMc7xkeUlCxpbJJnOKVxogmhtf/PyIh5ONNLrbTNIe55lZws61KyJBaRBayawFDYveiB

3ktaJ4Ipt/AvYuAlmbBzJgQQWCtWCBS2jFwPhVSBs9
JsdM5txkyhfKUdEm0nB86i3PbrURJDBFG86QVB0dx0Hj0lKXKGAen4tYxVGutH/ecYhweChbv0MXRv2O

cWvGqDlRIRl2LEzf5KXI/+USMfHk4IDskTR8vsksFS6ElO9AWhs94x1Wg42oXg602rDRyM34Y5HjwN5C

T54wzr+yQAlq7l/mJqp51ipDXDsNPU4mxS7Utd7p/v
GukPydfmxtUT725YXtb4PD7pU1/0zm1WK4grucm0fhsdu930xPc6lvF743xRkTd0t6YAzEe/EWBRnvxX

pPi/Fyn+H85wV+HvTIxKhUeWcnLN9ejTmVd65vvJKBI1zCraryoinVzFXCklFWWJPIgC8RRv1ol/gefs

Wgpvv8LR/NllqF5RWAj+kuY6u0XDEdKcdl+NDfh/8G
7iX/7gf+MP/hU6x/kx8TqILcJiWHaSey/CbybwleDHLsFKQJO9R+qoOWoAQCdi/m8T7cnnRfZmDOav8h

+/pkKxZjWomjQ1fOUdwGbSWtwye+ny4+cwbaI3vBwsH1NhL1jBitgt1vtADvULdzdQ0EVGL3e2iMvIW7

9lJI8m/Qcs81HsTHkKxH+X2ofp0r/+sG5/r1J7AIiL
Yxqej4pVQkFANdY8uWhr+q6hBUfZRR9QIwW8C4TVBGWYkf4gTETQ5o6Ma6dNsesn7tFU2ZxZwne3LqIm

hksToQwBmX2PQl/raNW0SUuw/5QfccCNXiXDLeepRW80r41ktRlAPUNFo0BGGs6cs7uzaPnpqdcP9MSo

pZkzB3J4SRyfT/8DK8SeF4ND+/QQofoSSMmikXp006
Z0nppk8V+468kq5T9frNpbSWlZ7CuYqY5a2dx1rG5OunBI6W2/te3Hx/sQDDfWKR63IObS9b3GNpm5ff

Hk0qhjh7ZwqLjWaa8VlhLDd8RxIQ3Vv+cvhgP0UhcroK80H9joZiEf04Qn6joPOgMOYXyuYd2/fmZDJa

qsHlvCRyvTeik1qj8gbu7zpqnLRUTaLSLuvyi2AFWT
KwPpRZVcH7m7JD0/yKaJ/tX6jGrFUV98Xp77MHNN7AQZ+rlblafpb68qzVKrdQaz+rNnYCpg/eYfurZY

Q6A2dNhkKj44KV5eUWpZ46/Y7q7N5vv/2x1dyQD0w6wUax9ZRrSQ3I7Ji3z1A0T7QNNiJ2nRnTobhJgh

xIjZnV8lZNcWAuazIhwzMKPRaVepCXII+rXLsJABeK
5jTwehT30bKMvK9k2KAUksLyjKYrJYfplh9FM6J3OuZHt8aY5QFdx+VQwZ5v5Vc7LcQb756NTGlZPCuI

bfH1rlkQug8QI5+YlVvpiS63tcwkZ9NUDnVw04vTgWBnwAJfsH2Tcc9Z/aPfoYr7u35neTVA737UOU/c

SUHfLWMknU2lpCnLi9aEG+CeHeL35Ly0lVzln/l59/
Ef9F/K1XRxarB4Sug8FOh9bvQ+uoOe9eLbE3bC/XNeyCzyVfC2Aj1mky1FhD3yV5v4cOpdrBo2qhcp7S

HPpctm4uCseBADZj+/ZV/cxyC9uh9112jn1C9rQuxz+vPtxs9Yo0jtZO2X+S5xq/4PKjvMxdTzehUHCm

IXxJlwmn64dzfv1vAtMds6YVC/JSsj5n0Nbd4oemFq
T77z/3uHV++XJx7UdPfqy+/CdCZoJcHuEMh1P4gYjY8Yy7zLiI96p+QT3eXdKLmolfdZDcL/OoZdNyXc

X4QodXTIZt8g/AgrzSIorAg1zihitmvzYWA7WcofDsrMRJOMEWUSFN53dT4J71eLuENean8rbznzHXr2

0b8nBulHEaeVARR+ibDLc2FaF0ATgkaWA738XhanqZ
pfDzYwWPDfHTwtDFkYdKpOUKtuDV+SyoOf+wJ/vlF7DxaxIWgGOwQC2RnK8OzYsPLbByD0Noutyj+pmA

5NgCgfd/VjaC0kOheQkNPiBWGZzDlIwIys3p0jbm0NreQf+kNXfYFfc2c5ng/XD1SzxWew8eeDfp39NH

t9rQuAePjJRxw33Qh+LjaXSd4GazNS5vO6A4RLbYt6
iceGiboXZPqxgiHuGZ9t5m/6G8zjo1QCaW6BiONvT0fYMPgie4t/SUcmbjo8wGV83RAO1yyTLSQ77s3z

cu/DKE6VdB7uRrp27tl0c5CUV/PBgM3F0vWgew/Z/Vpki7pcDKQCb/4b+RQw0vgjPEx9fY2JYeOZP5NV

YkBjz+M6saevqBzhjUXwsSBCp6iYOyDxqFzmbrKA+B
y0ifGGwcJEhaAWQDhF4UN+vfPFbXB/7aRz70o1bWCzBrqEcmLbAPDKetzoEAfC+M/hfE/Su8ncYYxUqG

TmYX0iGP8CWmQ5A0Mjg4n49l8Brl10m/Q7hEHb8TxHrxTcIvm7LlXqZmbp8mL1H4Dxj4TPnu3lgummbd

KWwu6WTsTWn7i6ozXsKrd65qw0Z9rkr/Al4i3XnwKf
ULL9jm3GmHr8TrN9xUu7BIIYnRtZoMZ4ml8/zzsRrBNRo+ZjisCmOEcGwY/iqv4lnH4w5MazseI/Vo/f

nMWKqlY8cVprEy/kHjh7It0KxF3DwQMm6W44lzNKaMYasqzcELBL6n1BHFEU8vkehCn2OpOK/j/hCzC7

bHccrOR9tRwVldVjdA59TsnCZ9gUHWzhdBQJCezIGt
rgfLycLcOswiGerlT8txDJ70aykt8pIzcsZwLFVC5xNpmw3JYCV0U0z27ukgvXCa7byrZmIh89PlBh2C

DBrWl8u4T5c45HMcUlbNGR4qYnKbMhqKqC+AzooKLS7xk9g3M7tO8QO3eJld1N4lcSw5+Vlzgkv3MUoV

TJjFlUd5Fgz8NN8pHLGi8XJ7s4zsI8REM7NBFu1PFw
JUXhC1drnHJx7l6WOfxx15pvoO68z1qUERsRBphzxWMpvAWjuK7Pus01GRT/eOzMKOoXFqrHHLp0rBqe

TkOp6A7jviq788il3rqQJzn5XX7bKuc8HkOujzJRM2yxhbB1PO/UyMoqdSh+N4Zig0syxcvkL/70NzzM

ZDykco1tVxe52c9RpsqsqPhuG1DGdHL4t6iFoteLhN
1KXNH2/g2jkb97Sd10XUVOLm0II7jmGi6QG1RueoaQ7+iIuYdIYGyob6TSv1MiYpu6S9Bu1unDX3t9l0

8tbCHX/ZIM3OVFu/cTfqmSDaUjT1PjDG6FVgsiL9982u1mOkaJTxEPmdd/b9sLoB12xdNVSTxMTt94up

ESZR4sHcWS/maKDNeT6/kEd7wkQWXICCqujs+v+Btn
HKGZeO6XwvbQnOp2ggipkY0p4fvoA7egKk7vvwVjrVEShNqWE0enZj6AJ6qFn5Tto93BPbXXaTJyyJpt

rdjNG0WxIwBqrINRLy3pTl6Aj8PlknStoAfzrU5cOih2TgOA9C6nWVIMI+ACUqYvmuyTKLj7j2oJ2k+p

lIxOa94VBzzrVhb9Nk6+RIw6Ou+27rR3LkQz+WeNfN
uOZK69BDF5xHr8kpE7aXlBSIEqjkSxnTV4eXZKY7LjkYOJZCWAbHISOB+8UuN13l59uc1y1BENo1Owkp

2PQkohecAfaKKC+kMTgjSrFbllDMO9P2U86UFdfN/fx4cPCOGvJo9Kj4EfS6qCI7dlbhjksVaFr57FRY

Zf077RsIB8rTykup91BTMJhL4YxTWDEE0DdtHVJfnw
vUG7XBuHJHkTNaQIwMTWYY0cMlKn/BJ/tRvswN6e5iadYdEdAFy2XHtSP1WhmS0a6qq9Vuf802v0xLd3

rLQelklbrGE5JE+wL6VLiybMeHC9y5Z19jOFNie4C2ROCszElEqnRAtJry/VGqoOlKVqwsigSMGDBBjw

a1hCiieN3cxBba1e6ZVp79qxpyLwxSFCo37gOTA5Mb
Y+NbX74uKERdKoQZEOaCMiHX+wkQpgOJqh/39rcET5f5v2DdMYbwsON6V02g1rcz2XAXkhsxxRlhbJ8P

t0590EY3x74MYu0OfHDBrjN7v+5WRfusRQvaXOVxEjX+nf4hVdU/lVNTHwtOYgeLcJ+8I1dzpOkYlqaW

5pxmu8NY01LpgT1mQkvskavfktlSD1bgEMXf13xdq+
rKIhK59U6MBfy53nVKRcNCK7D4dAJAPUf2AJt/iknWIPjo6c9bpdqrPauzDHRLhOdeFia3pWeCigaWtf

5qEbBVH7qF7erz5hv5qu/x4k1quTM+6eBzlWyyduzSfSINiPVAk8dflUwqTplLRMi2d1aWvPnzgWyvlK

8xG/7rVz6Tp8ju5y4KdpEmOzOxmGmQGVEnZBHME39H
fYd8Pdh1hoI7DqEumWayRyRVyc/3P7E1l4H/dGYsvI7emtk8nATvmCxgcodYeojcLcf5j4/a7e3DDPNO

4HiBg0NtYLaboVu3EtJyft0tEyflXchpR8SbwbM4sx/yFJRn9SAmHpKJFZVB7bJBn/ctKYi34XFq+Ff1

hfhMXuag9ws8PdbiXgVHfZlmxZLYkUV8Nrjibz8irC
EPjYmYkiCKFliGbyfPKnDSC+jNfxUbdlSpZo9zt8rtfGZcBAxCxHWqAxZoxQ9wITVVx9ClBb5mtYRqxH

9qRVtAx1QnegFaWQ89q5zUJK0kmenh+HWX2rbp5yTp46+is/+rA22KdBYo24DWz8Jp8QkzYT8Tc6X7Nr

klao92GgXiDd5Rm9J5eTlzl60NgJJnMgti8v8AhFsh
F9m85GFKzcT/gELkbUVD3elz1qLDN+5vN12FR8tkj6oy+/8XfJ6NYoPqkYVj4dAOoLZhYS/okRktgHZk

IPaWh59xEpcJCw4K1FKyY18c7NgxdtoVermd7JVepOPYI+M9scHt0G8pHMKh3tZeGWX2fYfaTJblLKCT

G6XHvRrDKY8cSrXKpgIBUi427KhfU8508eEh/8PNsG
3sxhNDXvU+Wc4WOtHXdA0ICxLWGSoyLaUDsYaByWXzJ1rbR2cX8DqGQTpy8K6bYWGp0ERQx1QIqK6xMn

oBzG061bJPyQUrt6l+0yvl8zqM+i7GeeSywbTaDUId70XrbTqTRSuQme2344mn7RXlID4gPa5nBofvuw

cgAFChZYpNWV6sApNuMaL9OkYk0QegxWvNyUC37H44
lbUi6fdMM8n0BYvIKP7zwlhPULcEoew7fWqRmguGCLJUTeYJufyGP+8Ot+0cfLyGmzdDG5KCEncOAHlK

NnApz6jkS1iL5qgVO/Fmw+f5V0CllJ7NlVQj4QXrmQYApBXPzDySkrpw2bwtcQgn846VW3PURfJ/HZ1K

YslSuitol44yhL6bKPPbN3N7UcMM2bcO3d/TNaZ1Ez
bQ4PMnABGg915SxAxpkztbvw1AEfycjfgDeeXafQYiH0Uj+u6sF6rHl+A1rc1EKUl/vHjB75r44gjem4

M6B9321bATHxjsuRgficnXXVR4WekCgYphAMuABU7ciC2JZdkE5GdaxwUUmfcg+JpUgYvibvbFUfBLej

uzaEXf+F5UI7Qrzu26ablhieXUuL96L4yVhkr41uHs
T7dOWCB2X63V2jyCVn8wDib31+x6wUU+qHUovOOS0T0wEvKEN1+jssLUMTmSiF1XKAv5CJunshfZSln9

J7T2Nsk6mwh9dt9d88JMWUsxDPs9kWHf4sB+2V2w+cd5/f8zuaE4BwcvH3o3d+M09chrMjC9WR4yGiaz

KD+eK1JY+8z/Lfl69a24MnezRD9FIHl8sVrDtVvi9c
AtfEqmVB2oprlovMnN6ee5Olir/Y0KHQn6kL1x7VIjHkSqbIvjoXwJdmU1Ofe2NDzL1K+ZD5bCXwzJrf

HGhFRRsHXzoviCM9r/m5MVLO34j3mZTOZZMK80kdTsFRKv4tPC8J3tr+OQKPgSpWZELebeCRzTkTeqHa

wzqBqd4tcN7+MNLvXDRjaHM9uinzZa+Xq+gIWPsEQJ
2m0JMPtMNX6OPEMQbNy9LZ49R6g4tOKJxi5j2EU4gpmhubcpfYrSWFTZvLWf0s50XT0leO6YVcvUAr9P

v5pXWl9BgSc+RzDOgdipBQseCOEKL+udCLKuwhOQysHcdw5QXMY4qgnawLKKu3OmekEvZUTO21HDwuht

Q2XXLx5IjwDTXS8qqjBfCkQM5pPDst3bP01Gt/eu6i
6z5RR9wfRon1bZNeUybCFa/ABMZQpfE96OfLF0McPDNmA5AILYslsfCUB/zh5+zJkQ15bGMYHgLrCzBu

Z5Ejfa7WrGC2BV/pw1rTB1pUdyJmvzaa2l9COECeANNGWlWM5Alqfa0q7PnroBQL4SELS2pAAbZa4Z59

B0N50mFcP1B+sGcOCy8BTbOkulLcqf3uF9jb96eNg2
d3xdkFjCadLnbnjtSw96IhaJUWwPrbRgne0jk9dRJ7BGgQGZEA65jvNT7+64JcN0xntrFCeebvxXXBZo

bV/Nqdv2KnYrQ9L5Kab22XLaPIt2u45Sxwys2cv4ZfDzNuawEt9a/7j2EREhPy53uk+wVfcTwKb+TuFh

OSF/8xwxYGtpAC5NYHle2NfrqXSCf+z/7qgM5QDCHw
5hEfXNRbSnWlnBjY/jal8j1goSARoaDCG9Bc7VosmIRv0V1RCvHh/bCSnbxjK5cbf8w+tok5OGyQZHcN

ilCHG9bPtcbpx1hWE6RWh1V/yPP5Y/Ft2j2TwpzKSguXqd+c3gJEosdInPmgjiHLwk+rLk+kluyqMnAF

wD+1ULk6KcX3WOkzK7qTe9kWUa5Xy/kkQCn8q6VZ5O
iSyo3rGqS42JKeIFHQpHxYmRFq2G3+DnOgiwG7NVHwmYn8f/eApNCkNeqm7WFKW+brbWSlZmESR4U3Mx

L4mQJrnhV74Q2/4GRn1RibTcwDyJXlb/ZCBd5mvzKkpm7afq+NWZN539mVnuxWeN/nP9Z4vCZVXq+p9c

oNlZ3pkFSYQhIN0nobpeB6G7F16XPrNVicEol2PYBe
DduYb7zlTlxW5QZIl78BVk3e2GU4Muu3YIVmkxPzS3e/6WPUsJbICRkU1XfPSX84pTszDzanu2+jMKZR

QfPO+OvrRIPLHEvaT7E/aE7ep1/7EqKv89pLqfg1g6qy6zin8ySqUGXg57zzz+YxX6Z5+pvAg/vSX0di

Tn5TXTXdVgXgci6dzlqWp1ZXhKEWj7GHy7QfelKUXH
dq/tffh7lvzjAxaSqh5i3kVMlFMQC00a7hcHnQKhxqeVDW9nyzG4vHGUKWXsAt/G7wgCtnR7Bn7UQ4/H

sO8r62BqyA8Zaw9Go/Qdd7dJj6dWe5VbMKzlMzbVGDl18k64UxfOQLS99EI6vWomtCSYET2AcpS3oRiz

eYVIm7RwpSfo2DeVWnCjPz91CsfWuIpJhV+FPe2qh1
4ja6FpF09vWmMnN23mAvdzQNs3s7HmWKo9VF/RrCj9ilRHd3pAVe/8RzBYlp/N37pWG1S+xrq+QoBZUj

RnPVMsUpmIMF1tcGAlQHxo4uSAPdYAnZa937QfjZOAd6zZCNqg1dfq/xK18LIjDWpL5IHpsmL006D58T

CPt4ZhSOAPoWsJuUbuT7Bxr2MixPSBJMdNEq2OFFaf
O1sA3SMGayB3ptSW1ly+xoYNQHvZqo4gRsZvWI4HBJ+AEWcLmvdw5VScb+mvt8gIPCCxCk6NntYKQv8R

8o/V++BFufey/dKKWFaOSE2+OU12ukAM7Yj9vUcraf9Jdz9qv8CP0oP/gSME/XC3Xo/PcnXySZr3q4AD

WlonmtL3PwvYb55HtC/UZ0kkAJC5GeYfO4Zcis694j
dO2WhU/IE7j/UNEKkcsGq4E73701DkFCYZmwHm42iEDi4KQNyL0T2OzlecjvPQ53fpvoCpP0ZuX/Nzr7

Rin0g80Rn7jlQfloigT0shdRLLgnG0sqqxdS7ikaGO9Z1ND+mTTqQwjVnegqu4s+97o6rMKj/hFNS4yW

ZKfKfrXAJNFbjutJK3Q14Jobw60NNaaa3ajG6rKGpZ
6uj5iFqmVnw1T/HPXtfAAMgSVHAEMe5BAF3lBtmKv5frHx9vYqZ+IXJwfRtk3JLjRvRdUbjf/WjaNHVE

Q4yzhiMssfOhii1U3wKkBF8sKOOUPchpvNrpD07CW/TLc45cZ+fIDLq8atCp08OR3pDY7MYYyXSDqt8l

pttyvQ6Qy24Jr8+21sJcXFHpft4nchH4I5PjQyVvUr
6SEQ81JwDQYv3F2103Bp+5foyT3xUmf8xofsl/uNDspyyrYQ8P30g908ysPEx2E2ilyzRlQnxsDBO6iA

tppzm+fmrslL9lPef+nOeEi87thwDHxbNIThJ2pI+KKcmVQztBRNDLrCMRGAv6I+3FVr6U3+IVp5sP46

2GvqZYHXGXY4GACcRIXdm7Qh1AeYDMVz8Rl5geFMRb
A/jiadWDC2LQzmBTqnj66SKrPQkQL2jG5jrFosUO+zRTlCosHFf91fZBMzSukM/s1LYzNj3c0FrHRvNv

hd/yAD0rczaNbA23RrrBp8m/Oq5nfyRzvxM+38v+cC4+cIdg7c4Us4XUH0rSfaxqNAY5wfTmtgW7Gt4I

yJbpceV3DuPrqqDiQRswXpFEJu53u+cJh6NJl/L4Xs
GhXju8x/02SSM54FJEiN++vx76nP+rMvYykpk3Za5eBnjj5l9qZqyUQOy64tDZJv39bPEMJ5uj4l/qnx

nkULKgI/mfx1mCKhM8egw7FE8Iz3pdPBw4K/4JQey4oRcZpc959+FNoM9o04qkwRW4ChmnBm+jK/S7wV

vJJUdVm4klK/bprMBH+yN4+C2fhF7nP+8q4+QGrgxy
5ETtNLW9kgUC2d4HaBr5X0U8u5c7vNfKjqQptpxAFahVYd3NKuKXhcSG1LriEUTHe0P2cg7i3PunzdX7

Bgx5v5qQbffdZ5BbTw1bxtZR7vQkIIT57HT+R+7Qvgw0b8mlTzNmAk9tvxbng3gZ10tHkqywviSpIKjn

pLVuehTww9gH/Rur0dX4Ml7CeHZBus0Ge83OG/uPwU
PbQhGks3xFWF88wGexFEKR3DXJeAKvSLEJiTqcSaAry/NX04e4uqXHen22zqzq1eiCH/sLZ42eScB9b3

6fN9cQpbkCaoKu/ZtDb9RJFf8+QbuC5gNjVs0rCx9vwSpFzjj3Joh8Zmbrr7Jxkx6rHFgN1rZ5iYLlzi

f0UZq2Zks62VJoghWcOB3Oj1nI/VHYLGwZPELGNXiW
SO2TCbGYm1sqzGXu6t+9mgP+TMXrxi6+51wjqykGx2MCGtOPIZBJ6xuoPepDzpI9vHXSb0mP/enOjdz8

RetwX/vbiXrPE+q9VJ8wAbeU4+yQYCeyGkBkdlWEAdFKGuW5JY0qF/BTNMbqpAjfYkpQ6udpVSuaSWhc

mcOeNsZA3phqM3iEEkKdsHYF4jGN6s2i/sCzY3n90V
5d89kaeMVgAodvp6mUF2hiheslUTigmdJnrPe2s52L3DyMTxeRUu5jyatnqFzZBlCdz6+PKWRXJPG0ZR

m9LhktW3Txp8odI9K6chGWPuE198dv+kcoMTUO8KzB2j5Vku7pYFTgnoQaS9cfHBDvRQ0adT1YfkOIlx

ZTeDvu8d8lvFn7TdqAKExkP/xY+Kg5t6N3oxwqjBQh
3ZxdbBH8dFVgbZL2j1/G2ITCBJZK019Xsluuz70obZ5jqYl+RVTJwYyPRuOd84h15pIr0BTelA5SYZdI

Chs94mmjDxBrsh2R7L79iR48VNZtdv0XnFbusrja1fXdrdGZV3SMVtaD21mcpDU+SgHPsKNHnpVWQseV

jDmO3f0SdtC/J5sigCycsKC26xPPdmJ3Id+LkxuENm
yRI1Zobpaa+/OnpQgGFzBnF8vs/DS/YjDHNavr5wyfSZths1TewpuXtdqALU+Zr5KmfidbPaAYx9kekF

ZQIc5y/O2hvcBxxjNnr6Djbnlr6w1+wh9O6tPRXzj9Sl1UlShoPE+O0iJ2sY3JKxtwXre1IzOxn7lZZI

MRqx4wJINxnAQELQnfAiF2l6jRi1ef754Y1Og1tgSp
d/c49B3KNgFiQrF9/BIpd0+VBPQlW7pQY0gmQj0xL8NZD2u+4Pa4zHK9O6fJvPByD1hmQy0RK537X/bX

3IlT1menFsMlW4czbvGr7KHIqLR3r3WmVscFXfFsmPwUQ6nH95UaMHnATWEnlv1HW7p3YieMb6ppgVfN

T0kj1VUBDFRm7iJv43ks0bH6DOhfdf8Zm+/TghI+mm
mmIKOHUO8VCFYBIwp3qvMDQngCxhqNPEmpgNoU79hepVY0Mcrn1k8dV3NV5gqC8L5UjUHob9NBdd5lLA

s01ulfJNsiIMU5NkfVxmMXW74xiJ58oMBtFP/bKifjfMDFJ94kOJ+4J1C6Tf6F4SBXzJNiLj+vj+N6dg

ZeLkMv2puovd695dvXEmBUJn6Hw1E4heTDCCqazK+3
4Znf9/ANNE MARIE+Ak+BRBhXGwrZgS2U9tl18GzbhmC568x8ISnHKzA/urfD/z91/XXy+1OemEhLsIlrUpDkvU

X7CU2+FvfUug2vPLDYOrwk6fWbZGyazqEERaHNu9UlPKpSSFZTSJ9XKkfSj34HTiLUL6MEtYReeTYl/O

fbOezgFkVtICey40X9i6m9na0fjyaZKm38xAP5lmC7
ULiG+rdukkEPnpg2RVNxvg0lGYb2JlPbbv+m9CdOWdLaQHZC570/MB7eRjw5HqCjUJsIC+1CklEI7e0i

F84wSj6rJl5NAdO5whjNhfnzcMgtQfWNv9D+fftoijd66fA6AVeJI4EbU4CAJT29nsBd38WCzEz//mjp

tmzoiIsB/z3DCCUzWirl0yR2JcUvW88fJRoq7z6GLY
EkWsluSRpzk21u6gsRYBPh8WR8nbUyg2ndt/ZR++pB9qzjdxtcucUkcX+Nl8x2vFJE/nK2aITQt1XDVo

9PX4lo0sDU+rPfziQbBvcWXzEe+vr2T4Gh5P37Uri74zEn9DvET1a/qKWdiI5NO3/6dJf4nHQXBGrpiO

7lU6Dssl1GScX+UBuqUKbi+gFcQ2hptIOIcDs84ztM
iraN7a7fEEL+MCn9opOhP1CnDLqRJ2Jc8gaxJiUxZ5vMDCfQpad4R9alBjs02dkEH5yovxm2odoxi1uN

fC6MCsD2b9s9umDzQ/dbxdlbZ3sDc3417rw+al7TLbkX/+MM7sAo5rbOR1S0kQBk8QIJ9UrGVFRVCQGO

M1pSDtuRm7r+y4/ZqU/yAjNorKtRoKOXnx//zZ6JLE
ICRWRSYtjoU5/zOlJkEsP8G7mCgwnR8ScrjRAuxw9keRUs6/jvJSUaZ0r9kAQA52qGK1hyE0jTY1PfMe

OPXYGzrJKGcvbzuMRIME/GPLfyr8aiDdIZyuLYVMjySBscZT1uWN0koP4a9uTHE/DyU55ohK7rRyNdh8

gcIikEUZi4dn8eQWp6ZnX9ZaHpPr8LTp4EE4hhRp3z
ssCwHKrGmEK8U3N7WsxPGXutCa6Wntzw88EUjBKibZ+YXJ3Vepparh2JVg+RidL0g374cCn6So7sBqrv

gDV+ivzFvOzSYR/zay49XdO3Wt3y4cmZnTvjNL7dvvuWpbVpwP2Ol9DRUbGB5Kw+r6klx2mSzokftBr0

yw35YO28XQAhYM28afZsDakM4mK4Ax8upz2bPhS5GK
6S/VprVNOTJIuRFtbmDF0WHn0AIcethtn3AFy9OQXMgJLHqq61/zkICGZuz5A2PYmowThTHUWv4OUWQH

XpnjX+acKyKYmBAjV/Jbz0DWjg6mJcpP15q0mX33GYnMHGSBwnfPVBDhqV//xNkmIdL6TgwtxeQXpx5M

tXe6K3jXTNBIc8hUN+dQhcQmzbRVZfbjRNnGGCz2HX
Cwkm9IS3mrwml/ct3Ea+REq3gZSBU9AiLo4yXzmSl1nAtO9P0M1gNmmIFOv1wFw3GWVUwTDBiEKYJO4q

3VIf6g9Dz/OFhLTbVYjGATiaA/duKHkunb7RTwcjElsx8URIfVSBfSa3c4ZU681EzkBZxWuz5BJXRrJ0

TR0zieWyXw0N2IC8tmrCBA8vl487N0HTql3ywJLW3s
QOm22HwiijJC8oLUQS6lIDM8T/WAnyRvztaAkMtwesSxintGz/JxWJpUv3obDephxpYuJdBd/eBSeJBS

rnTFrngiRBTyvLY1yYFlKflDmGNtaF5jpf7ZgiowrobF3dXfh2+gGju3bRSQ6E9u0uygguzn5HiWV29l

FJe7MpVhJYEwTMZfTE7pJ2m0RU35GLIM8r+3lYY0vV
WY9myDeUh/ZsxrFvQic2q4TOkah49Sl7J1XoSMiddGenxKQF2aiSnw+AO/wmtwrDs0fB5nyfmMTVQezh

RpF4tA3KyOxzUtA/H2MH5wfAGV5TzIcWNq74hN1Rp9xMkpHnKs56E6aXOk0cr3+ZcnIy6/J9cqaT6h3Z

bgLacCI8KwDOKAIk1NrZ/g0wzijHcwJ1ipmAOB2++6
2s2RNQ1wUmCx6xOrRJXT2GGzpIe8yJg+IVph6jI6GbYb/AGyO74I7i36ALf2YcC1hg+MjzfaW/OF71iu

C4jnvRyIfAj6vDZUfKmee9c03i2SPxvPf0+JdAbL5f4mvLfHsIcW9b8jvixFKzQvOpf1OtZ+ex7taLcn

XgSJ5kUOwH9uoMMs0VJ5OQJ6YH/UeUK+RHlgxJFGrm
F9JsriqsDB4WTkWrFVHp6jcO5rIC43v6d77+vug3Ed4Byr6EPcqWVYZkVGt6qjHa20QTiusXLvKpMFKL

nKV1uETKdoeJSnY3IOHw6sw2HCURKq60dkphVHGI+z+qLQ9YWh9mIWkw/4kav+VoSPVhRQujI1s16TKD

1Bm31IiftkY9y2vGy2Bo628kpfVcbmR5Vixlbz8IgB
iIfgKzgZS7uogQha7nNAv8vujsSyzqS421Qzq8i7T7WORWIBK6r4hd8XcgEnqH+dVarRoz09fHf7jhm7

HFP+HBaIemen3I9jA2qBdy8ZcAM44dJjeGJgq9gmbyObjejoeE2PHB072MoFP91ABafmsjalaIaoI3YL

jF5e70iBkf6ArcSJa7EY5u89iYyKX4WYZJtSwcDQnU
DUy87vE6xsJ4mUF/NpB49hvoHo72VTLQrUOC7PFyFw2kdIFs8a1kfn07+A7bxkTT2DJIGw/DNmVI3YKi

vJl/VfLL2yetgpn7pvYu1eGK7nm+wIPwE+rwNtbLld8B7X638BKoDPoWgxItEUggJcOMh2mCiavEF3DL

u1IdKedCRIRh+yjWTpBBv+r/bt65Hq+P8D+EGRXVLm
AWytiOv2XjieCEtIk1T9PeiadWuBYhEECVtjNt/N9rmVVrCtjMSA7oU++w0Rl7sg9jm9lVxzp/fz9bp5

n8Qqr+bhltLaUbo2Zr+i+8YzN+wN+xz1bb36ygvj0qe/VvEwfjwMngakj0X7RqhUdDLHnLLv6fuocbow

gZW3H0VX4CDaWu0uUNicNTrRAWPoYGQmJKVBElX3yw
FxdLV9+TiF/HSItm361MMLeEpd05kzwpU9LeKPN9VuNuP2qVj5iMnAJtJGFyQpjXxS7iEl3+mjioWhm3

tUm5mqEfLkFx9XoMOS/wUFeVP1HYGxG3FhcL4Le5n8xuJaymG11XhNmY9MssKgb0vBpjZ+M9cSYH6j4E

SJg+d5aFNHj5e1LdpjDTnN3KdnZxPwkspUBPNrHaJ7
i5MVka8ajfOtMkpBCIeX4X9j/CgcmFE/America+Ad5CaviTQgmqoP88PR4/sDy+/y3PwwxfdUZ/+I4Zjhw

RuiUUtyu7iMU9SBQIMTDOXYMvoov/aAyu3eJRcYp3JvI7YIEqNPX4zMtkrF85xvb3c9j33UFNnXGr5pT

sxoNGpU42it18bGgZEBpmZhyZmfS3WAmzXyA+vXxV7
z1MnsKfBCCgsKhC6Xs8Fs/W4ogiGOncDFFTPTWB1GsDqtyQc8/UpKg/9tC7p1fe3lS2wlxDnG1PJMv/D

77uuQfRXZquL/fF63Cqm/kyYDYArg2RzrRxmcbBpiqkby6vOd1vgM92CatNif0L9RlEm6l+0R2Sh5jm7

rV5UZ8zt2pFEuN4PFvk43eyWwDOds1bGi625VwPKfV
DN0T62XeI2W+1YvwuC9/6dWoq6eWQ7o1woVeqDkx14euXPb5zVVVue07Tl6DfsxxxAEiqGexLHXN6CYK

ebTw0OjciDjnB/aCSCr4t3vLjVh0tDRlqpscsEdizvmijCff8vsCKvB03Y1EJjoH9lqF77zfBHyY3Q1/

tl83eA7FCIHCDZPWEYDHXMEYHDBJNQZKFRVTKDLWAV
AAAAAAAAAAAAAAAAAAAAAAAAAAAAAAAAAAAAAAAAAAAAAAAAAAAAAAAAAAAID/TbzMP2CAa019M+ymWT

IRsJfEwCQSawL5AC8mY1i828Da+EXQ2p5Ml1cb9/ahnwdQMC2G1k8eazuf6Iv3DSNDSzpztaADZU/9mt

3Z8Gh599rcDkyVpqMt2PWPiBM/1KxzRKvUOWqCTFrF
pu3sD/A++cvwyDYgwLZ2rUPUGLFbF16EBH9jGsQoB6hmhBh7q8qibCFFww5IpeKLMRHPJ7PVrllCWHJE

H5XiKYo4gmG4E+K2yFVe60c5CoevkKMpJ159OUAYAv415Lu3MbrnnThS5ZB8GRcKSqM/HclrtFRTs9/G

HIfp90lo7ldAnB3/Tm4pOXIgrrYe7T+k30wM0Y9/Xy
vyu/P8xPAPj482kj9L5RY3vy3O5xl4g1+nZguzG9eFHInmQFiahguYfX0zG75LARUiEIUiQo1GQ/u2m7

GdhaZqz7EltDoMoj6s/in1+3LsuqAlhavs55jYyuA861rNcoQPyKskfJTT2v6BLKdHSJeZ0oPY2l8E9O

G1S20WxgOOmrO+jUDFyDWdgMUZsuga37KsgZF5PTaE
maufk7I61YyCcfpuH96zvmk3dkSTzT2n0LTuZuMwLBh7P2AMFOqSA5jF82MgPQUGzh/nNUa/5WWiQCZS

ZC6JO54zc23ysBF2pyokSwoVr/y6e95Fy+o7GO+vjTbVDj3by/xhn8aK8AcUfCsa6O620HPA6Sr/TfWs

cJP66tKXCcIOj3Lt3F49ZZO5XAMYMnhT3OSVcKYDz0
UMWztAta3CTRYPl+KR02kYE1Qnu7lSvqEkAguP1JHipD3VMHlAygdvmuNgUeOLdOeBe2OlN89Nkq7BfF

vsJnj3VUEfWQywQu7ekp6Ellg0G7T44uwayTya2VHpSrWD6tibJvA7Pj4ujHi/yhzjQ9FkaM0oqXliHi

OJpbDaBGi9cXSa3/SfvzlcpWv1Z4d2cKYYWl2aT+xY
BlZbo8S9ssIxSJZ3C4vlgiUtzlEu0/xUunhUnq+OdYc726V22C0WapM6oh+NWVL2Vq5OZbzmuD2BxTDt

D4y+B9PfoINYNXe5nl1hdKzYQzqgbgbLrK8F3Jj5XEBQ3glY2XMoerw1nMBa/Ldn9GlCM0M6YzgyzyAM

mxFh72s0TXnixYt1OC6t7HMS7JKNuLO2kDZA7h+W1o
9iGMDjwIssAkMH98alEwiMFSvmgF7XBgnzvr4tL++8Wb/htz6nJUcgxXO3u753Tb6niUaeQsqhAi8Yja

bWScnIo1LHXKbKTYgMaZxppef238gzU1Sum4tAFNnSIzNb8Y/d2WMjGa3h7d7Gmx79KTC44iGaasBVV/

IIgdC443b/k3j8fgTFHJ4bUSK/ZHembWZ5C7qd6NL6
76MlS2wgcO0HNWbU5+PrMgbSXRiKZuBFFGCYa4llkyVEvrXz7ojKLEhHMtIMV3/38uy65NNB3Lg4uHnx

9lhVC+FV2EBVLYwMXyQ52V0RLExHpc+kS7+oF8EUL4Z6+VJu4P3qoE4c0eOK/tgsQAqgD3HDM9rf/Nj6

LK6UcLOkxYgTcbV/7kznqSAyBwOzUfBJ8R4HEgd+c1
+euIp+907C530+mzacw0bhniSslyag5YETlY+5lDFXxmRtt76zhZ7R1yPnTOJ/bnli0x1P/5VyFFJQaC

MScedVwjzLm+vUSf6pbkiqTZJjM2fRsY8sjza6SkFRKjmV6fykctNlUZuXkRpeGz4HCPD2Q+JVo8tPmL

CdAKj2iPzr0HB8nD0yU4IPQuv9aejf6LO8b6fWQhHZ
TFqXBfFt/RfwtBq1Kg10AXuXWN8oSxyeo+ZKoJPoIU2O/Yg2zOZ5/nTjC52eFRADwK6GNEQUv/Sh3KYT

OriJD+aTvVZt9OZoeL6ZuSKDoe5o07wUPO2GOhk01JOdvva07y36I2B4eEAU/OA4wEGDbogg7n3kJvjk

9cc16e5T8v1AAB4Iay+d3AAi8Cp//c4byeFHh8Mygc
/p71rObSpl4hC4/hsswFZ2Xuwdyhz2Hi/icNTFNYuTXA1loHTEGxXEFmKTjFw9741TZrm+c3D2R4hD+S

bgIrN8csWG9uYE7H0azlk5iM0H4KX4pLCvscQ+9TcV1ohzCVFBGLCeT0NMeTnOodbPiUbu4vucO/dBEO

IANlM6AnVAT8/U/ZpU+Q2h4SY6JMTHzZcVeKpZGwrq
tnNqkbpwVGKvqlG0t9qUa1A6TuQCMxMROoQoUFhz5cVdflZgpvTwV0c5jKGPGP0BdqcbC0Qck4+Q8DUu

0UJ41T7E6K6+XMxMUMbUsnyhsYzlIdfwcP/eM8urMuNBL6K6DIo5pO32ueTmgnWM7Ku29X3ErsJDEceC

PYQzsSdT6HZzd5s9oDiyTshrrAhTpWMMGmX0GK+xBc
EaEoQ8sOFsHuIBeN/9t0X1vXBS9VKE9fnbuh9TRmUuCWYce/3sS2qGJ1ue0vwEOkuWU2dnQriQFzBJsw

0rwQ55+T9cqCwhF1w6ZqXZdP+s4zmBuaTfKL06IdwZ/akc/NNKMD2OhAJUrNc1sJ4cqoJRgF2Q9C7TM1

F0EBZ+kpwrBvZLSt5EkJwAZo8CrA3F3wR65sD28JFp
dpzWjUlcTVEAt0cdcMR1FxLaWO5sOCW772+DNPwAXq6mT+LEdc9hglMSgtCdmCl0ZC0IdYyeyHLGt3iS

F7zZ6dnGs6wn3GOql60AFmm9xKlcaG4ku13HjOgdqYXb0hJgztMsXePvKoHiJzsinIiFqBXpj2MsXAAo

ZarLaVKT+tbM9dBMnXUOi350i+r8XiE2+nyQTw9lmW
210tQSLfRG/R76RLgngvqvS0Wtog0Gj9EcY+wrWh7Q2XzaVEpJ/j1illlQrbpT1NXzkZ5ttb8wdoGTOn

elTlDBmBnrKYMWpJNwSuijQcGrJ+ZuxA2qA9z1Ni2mNPLJFsQODUKlNeW8gqPetAXe133NRhpgnS8h5P

MNI5oNLXz/KaHicnwq2hfDgn0U5bBQ77WfjUbQaoQ+
x+BIu7xQwfR43Ro24/ifeCQeO2xhI1ubTmqw5CRKCEh8ejeE2Vx0ikq/q33qQuxw3BmXMfqfoGnIByHn

thu13j3+nCXUI4E+mVmna7aGmisIQIDFoUYZ/qJ+upnqR3pr63OrpSn1nrZGXvp0/Zus/HYhGw9bFOqw

Nyuax7Xo06MUi6/q4dZDHXyarijqp41c1SJWF9urYj
M5soMlRD6yz9XkP6ZHJfduwACr7v6Yeck2I9Z1WylRjv8R/WMHFz80u/Y1GGAEUjhrpGPOmqYtS464Vj

4uHbtG53JiylLJamOv8QSKfe0YPfrksso2+emBbMiyr8mCXSnoAGeaC01Q9s25lkRWD2ktRLGAkn68l3

oHnuSJKuvRc0Rx73oQn6lvtBaPtykN/FPiclmVUh+p
et/6Et1fCKAr3b0682KnuQru1VdR9QgVIvdgByKu9qdah5mCgGp3v2FFFSt2X4S1O1/Y0hjP3JRjMkH1

d30bvZ8x6l/ATc1n4pYOGPIw3ILGYIt5aZDwBraFjKV4FhqP6GU+iqidt6kJgHAGeSz/fZLFgE26BlhO

IZKseihyT/9IrIsSVEYk647vRP0Bewv0MghzA+/Ovd
DHLpGr0EFOUg5tGXjM0sYVj8NSfOkLvhvDBzoulfCYaXo+vpP+Jr1q4P+R30xc046e6xVjaPWGpziU3t

ZW441kAlaXe6ewgm/Aq0Qick/8q0d1Xlbn/Iw8H1iy343DQdxz2o3FbucXFkobS2XQyN5HBlynpNCnzO

zg/USq5RZaBv3ZlPy3evETlOrd0j3DRhLqul0ed/1P
dKrObjyJFjU99l87SRGmuC53xRKqm07EfT00Kw7tpo6a7ym6ayGsBS5mK6YoNdA8BeN6YjA2Lg6AHs+X

FQvDOpcCIsMZhgfyWhazr1DzcEIzxez8eogbJ/uJjUT6iq8pVcxVQ8LyjHUS/u1nlgJaxbqh2gBbO+l7

vuYh6Emg4GjYzYFNL1/tuPDuA1+fIlg7e+S+SXO6Jq
+dr65yN7d90JEvRkxLERBubpsyIyDZqutXKvgi9GqBew9bk6zMeV733QuM2EwLZ8hu9G3dKZaxB313Rx

STgY6MrVtyF4/52J9YSfWeYv64qOtGRWRL6R86c1UgZD1fn9UEqnZl+OcfJtmwp0UQFp/vuT6mKuMVLu

o94d83Cr5dZCCGhcd+GpLp0Gi8DIowTkLSYNs5dGJO
0iUP05UD5zOU0pqpmURXcp4NQMadWYWtfTn/UeoKSzKp2Nsf1TFnk/RvnRzwN5/cGB2/bN381Qs2nBka

XS1zDCrILyNv9RysJNc55HGB8MyO4qMsxgBH3pByUEWNw9tRAGF2UYwiFm4J9oMdaC4bfT/xCIx+As84

pdDNoPAMsH2rtWqh1pPfrxDwQX+e0VOezXXWd0G+cz
oyp/kxdbEMBwjudvh2TjufHy4FjlQg0HsKKvuzl0N+Sg8/xA0y3kbHEHBEpAPxolu9Y6NL+lDelvvAGB

Z2rIK3LGTfcMqa1dx1YrJVuCx5uKiNONQ4fNRXfR/pg6tKIOtBbYvVDf8whz6B+jkt1eNKtJlx+BKkzS

lwpKzLG6lCgO3ZhGvOzTfBAmYOY9TjnWbiJIz4HoFg
MlfInQciR2R3LIGX6/mepFQC8GbNA4elPJVMYbh1OeBZHXcC2Pq1uDr7PhugcxdEIDjeQDC2LrJBBypB

xYU4hL51htfaQ9YRyPZfMfEm+/d07ON6g4Hy5xs28TlWhE+N+YehFRZ31AePalD8pwzD/mBDYDisCZWN

MNTTtNEdPhhjlUfZCKFOe9FPeOxiTALRo0PhvIqLBi
OfQsGDMUyJS6Kv71A9ysI60x0fY3pmEpHsbksFn0k1FHlm40Yz+JZMXlnmSddVeXd2LJOTZS4+kmfnKR

wG4H8RFLYkyvHpmLt1mfWU6rgIlgGyaaiMkwvhJNlArpUqi+SYC2q/hbunXAyAhX5tIKD+9yh2gojX+8

7WrpMYb6d4fu3xDBCBHheQ+zn2jT03GpKNHI3PSgvZ
E6NgIFxT9IYry/7iM8xVfrYkI5H0VaS96uBbdtJ/WkI67vI2zu9+PzYUCz3yio9jgXiKWO8ppVSm1Gp4

IXI9CFCi7BhzPHxy67GNHmAgrypIP4tyecWTBrg8f2YYYPA+vQ9JqAzCNTIsnNY/Cx91zuGFuD8UHTmH

GeUGlPGR7dog/u0+vNjXOBiCzgfsgeWZW7vrXlC4zY
pluY7ec3DkVYix5ofdoiaaGaOts8Q3T36Hz0FoZn2h66yJkml7T0uXQFovSGHyBwcN4lzgRIuCRHfAY5

CYaT9MbSWK7wDMOlEUPaDxbuvoJB2v+0swTgALp9mTfCtxTxZgrwrnawH3OfoJoapaVtqNbQSMHa0Fr8

b45X1lN8t3xXY7oEbq1rt8nMIJz07PdtdIxDyMZtd5
T1dmekArcpEky+HETSDqtvjw1bb4H7cQvjwxoiUanMXeaNvWrbwiNwTmslWemfnoZIvPmkpbfshUgPr3

RkCPO0Goep5u+DlbHkybG75hTgLfTt0mF8vmmo+TWHmd1+I35kXGNE7fJAOS2+C3MEPqEH3IF9VTfpx9

JanAvrJjRknI/ru+REsOgk9hqgiNY1dh0T1OOdfWgr
y0LFGxg4oIBw/KzhB4l7YHtmDHdqZnmRysKNm/7/gdzIQVfjfG+yYafvx5a8XHsR+fiTcUS4J+fOop0L

XrfbKG6W1sI+oQZf/X7K8/x48nFPCAYJwIYGqa5ymc9ztCaGO6gTkf97wur+xsNF+AMoPkqJfhRHw0sR

xpWaJ5HgLpoO+Gc68rT7NEGiTwFe3FTSNlXa0LM4EC
4ivt9CRXR9MpOk5i/46MF3r+8HgZ1LGPstMjsbmXt/eb0ESH2Vi7SH3RNCiouzWqIcNsZj825g7qWNGc

SOXBejQNmdgm00N+6cWVVxstQkg9qFoldwPZYStCHzcS5Pmkt37DPiezsmuodgqjK7izA1dziozcnEBT

vyxqFYV6Mbsao3Fq9/J+rcRGMGOzBcIjs3QycOhG78
xO7R397OZ0mRRL+QFXRJCPRCq2GDi5WkuaOuXa0KJHGevDyTSNvZg13Nvq53Oo6ldWOYD4TJMcfvLp1G

6KH01FVUuUbMRCyBml3PYFsf7miaPNDJ/gbzzHZhhEo+30tI7JCSTnmSJKVPpXod/egYU6fvRAy5L+St

QjaEhwPStb5WIZnRb9+MV8k6zDSWcgQ5Ujx74yTfLe
eWCMKd/fkrQHJTodNwkHlGbPPIkL4LZkT8vpU8gtX0kppq97/p+uh14AwXtH92/UlUugDyWrZD1YFzYv

J4wMaRunlazczYhEJY6phBr5l+z7ukZUNi88zJBvhv8yAtMyFNxe1Ng1OjovPDeqWcZXARsifZQj130q

t7ydPB3cO1ujaIZjsNCtrrenKqR+WnLiWvSt5bzApo
UsfrkchPsMq7PtcjyB1szlABLgQI/+NYdVwgSuhqEmebcjs4JGWxOdvOaTy/2ZQzdTpQxnZYfDmrt2cN

khEaau1BRcJkSZwA+ZWGiuopR9VWBzphHluYbAMh9FhWucsCSZpjIOeC89HnfxTIdmuEiuKtUueuItZL

tyPRmvubHFbcNTEqPaOUaQZWonV/VpnMlj/NPTIdJH
P8ErUHU3Z2TvNvSdIhvfxzCH9S7ERm81+T93Dy5EX6Nw92Vba7QQt7GZjW8tnr4Ufa/npl57/SuHyP0b

MJXpt+0PSKhRr3zvMUovMb6yui4iz2IzMweriyUy4T7oqKnuZHUV4uOUOPWDdVPvj9XBcehzZ59P0IBo

yECuIvhP6zKUCxtnryAU9y/X+JHJqOMJdYrWdehxJu
BOyeyfztwEX3EaameykUfhrIutBfMRfTlYY4YGnLQan6mYPnu41RZ9y+9u/XwYsbG8IqqiEufmTTGZlV

IWHOaLIgla5w+/gkMtedSVsyn89qkX9FsgPNY/OJ7SP1DJAgxoAQzpMvH/NgW6OLr47l+MC59Jp+qU0p

N47XmIabu7hx0+Sq66aP6+EwPCNkPl8hDp+uD9r3rC
qS3HUdwkUDveiR45UdOLEJB669/ZP8h150Ak1v1two2vCsTofl/ScqpV/xar1BVnOd0f0KkK4wIKAzyn

qV8Ns4rYh1PsW0Urkcde7dD/081uL0uDMF4Gl6j4sj+pgMrqcxJZz7Opkd1xpU/mRZWSG452h/Rk9BRU

3mD8FWWWRw39ZNqz7NKsfDvWbn2dJ9kpIW0kNjyt6m
apOB0SMV3aCqTOnpO00Kc1yv4LjsikWAvlkBaf4HqY7O9ExUYzhr7fpz3WfEfdbMSgO0jqlpqhpBJglP

whbdebEr1t5SgvHzTMPV+WtuaxVwNQtziiES3gZQXjU+4+d504hKaO37N3+HraoUwG4CzODxYOMJzEUs

Q9pticah2OAUX+1h002ln9VrDbtZWWFisn1zVQAOCm
toNrXOvfzaF9LdixU3c3xOYrok6Bpeit387nKXh3awjD33lfSV6D9VKrew/FQHQ5GXEJPhOxntiDiqbB

3OouKPB/EbKc4uwRvZ3sEL47vL6hwP5gwjGdLXTImj53FPTzIalE1AxB+LCDi1wI0gphWVWgC5JIc1dW

M7j+bBc60DrEUyPQsLjmo3nQerpbA2DKFxgqjjQcAh
90dWTUEorkfiiG6qrsYvRxGHzeAXL34Y7QBgho237r29UDc+6sjFvH0eoEf9T4DWUocXrH3ltnT65tFZ

X9On52JZbD6C/mQ3EdXIReLdmraNR+UssPy69LjfuWwivMdhlymttAn74fp0I2mJkwQc/Fv4hPS36xre

zYY1ce6vmnUOsSnmxqVHRT+Xpd92IDv7vr9JGI5sYA
twSr/wCqQwmmi2cs6dIen286R2yf6sfFI5hBuE/c3o6b4PiwA/6aJbg3c3pg/1sCTwv2T6euUFewPyuO

u204R27Q/I5SuIcapsC8HpCSPidXggvqy7MZ0WJIM4O+h4W/QwzFVvGy0h8/XCpAkAR33IamKT4L925+

ikDL/pvehOJtopbhBN/4A/l6rsI4qihJAel0XyucPT
JUhrvbxqzvCXuqGLQH6/gVvR9AfhRde4c2a5x1YsfkcUQ0PLbEZgdkkvit7rtVP1WU29HATRgVGp0+sw

sOghCK3lkfYbVHO4jbv3TudlvQxQrw4L+WNMXeNGEljpyeo6iCIslh69K54z6R0jP3/JklfkslysLq8S

JI6kGUgROxpjdgyUcYlLyJ9Kkjl/48H9neMq7s/Zpm
HRamqftUR/4avsa4OxqPtI7IJajXvGc/5LgPDB9Nchzs4NcYvc58siggTkrP/GTThv/GdsMqhinERUmc

y0zc6zopFvA6eRNLyDlNsUXkhN1s82aNFUXTVT8u6LYK+JSNC4iV+fD7G9YKz39OtKeGWnttYe89lfe9

0C2rRpXLuOyJdudw07VMPQtVMwCr7R3yXcQMdnI10o
Q8aPKz8HT67kIgB7JzxnnBn34Yp60rvXkydjEJ2d0sKiHK2FJ0NlyeO8Bzh7oF8FGJ7N7aKCjXu46jSw

/CmYam+7/Kjm/Yq9udOswwnAaiTCtNg78CX5mGqs0lUNRuso1KrxchU32H3nIaA+o8cCQIX9YRoj9AE3

ZllqFKfV+kwKUdejXSb3nN4hW3umNDb8t1k2BG3ppr
NzTsyC9r1li97PFXDeIQvkIqs6/P1JBnU8QE4DD5ZlJ5QVLZhRj/lSC3SbL9ys3t6YHzD6HlrqXquv3S

t5YusWHWagGVPMNS4Pd/gYS+tao8kJT8Dq6XmA1pBwArzoyKWaLdxt3V6HQSmHlOa6vUUSx65BdxMUgS

Q6sDryC2hiVz6/10DqFN6X+jy5n/AXrfKSMNCmVuZH
U5tjYarT8tjnHrProIVYNsVHXcSrqYYPoyGYCsX4DsQNXfFt8wqQTtWA8EHcNCHrGcTCJsRAV8AFGnOP

BxVRMdZa4SsSw7QRRgHzTIFjYpJHO4tuDdiC1fdsYbZyjFPZImGSWiNcsHEErrTkkoxPZvMA3DsRU9QR

KuP42mDM6UQW4pcDtyFdWiFDKgQcs4L2S1KgYQFAZ0
EepSXOHEIpmYF7CNEqTtYWBuYLBgYw9bDJEZYjaPTvB4KXBtDTKiAFZ4Z2J3ZpX9YXW2IhGqBeVOQi9d

Rt3txMYwKFHeYq6KslKsWWVnSGTTB0CjbuEjBVupLOwhZOYoATXdXd6YYOalFAFsDE1+LjY6nbHggK9L

fCedGAIpRYTyRREbYBVSDO6HjAvILTVsuIYMcBYIUm
IBkyVAkkuyHEx+MCZGqeAQ506WOIciNWnEE8AByvgfkgqXKV2jWgqNIhVbBGT0jwGeuU7WQR7xr0IwES

8Vn3ZsqcL2gpPsYQuzZKQbWutiAEijAPEDXs==









                    ID                  Date                Data Source

 

                    [id number] 2020 10:15:00 AM 

[facility]









          Name      Value     Range     Interpretation Code Description Data Felicita

rce(s) Supporting 

Document(s)

 

          EGD-Colonoscopy                                         [facility]

[facility] 

KWSTGt9hEaMMZnWlTYIxWyfDSAeaEGuoEJMtH9N5RSpaQd6AUIxdvuXyCWGiIa2+PQTkCS5htg3xHZGq

gMy
5dXKBqKluqX5KaFUPoy61PGHZnHPmGUmZeCjMcYEP2ASE3IgBaZAI2GjDzWfgeJA0fLOD9VANrGMxtOW

WmRCFlJITvGGG8NU4eLFslWMepJr5KZX0hz3XwUQUfWJSoGbdYXEuvDIqeIXAeTFKoCZRaG098snCsII

3XsKUrDXi0QTCkTnH9KICkBuX8MXDtAaAmXjExHVWa
J8Fxk806wuQcuuB1PU4KF7VhOZC9WTs7T6edFsZgIMZsCZRlUD6vGfL8ZZKnHc7EcSjeDCUxNUJrTp0V

pLm9RFV3HJFiKk7+Pj4+Bh7ftrAeApdAXRGqEI1raa42PS4PiPWwFW1WSVjqS33aBMzcAq05MNaqVPBg

ZyWyYJj2Oc8lUaGld1EsY5ZeODn2O9iSPkcqK7CcYL
aoTQ0hOGH2SPTyBj1+Lj6zEVUwYG80XXYaZOZGG7LkgjSbfoDgQMe9WHZmFt7+Dy5nqbGlUvhEYRYxNJ

0exa12WR0ZDU4mfBunMiE1JDCsF66ifPJzR6xoOoFaH1DszRvuSCFhJJ1fK3VhNBbyCZQrPY4jwxAajJ

2ClVq1YBXeKs6InRY6JLKeO58iGBQsHHGOKNHbm9Yi
TO0Yv6djrmPqVWSjNX8JBCVmI7XAI4MvT1vvjCfiOXOgZZ2RIZviyHFyNSi4UB4MbHHrCRYvL26jcS5c

YZ65JVl+HjP4fwYkoE2WhRnyzmRSGU/1ClzxqXQ8tpKIoTqAGFCWN5cYXgKb7PCOmbw8eHO2F3iCck/c

GXTwze//8d8ru4U1ambp4y/z90aI67xyCet51txa3+
ykvhYuZLQXoQYav2HWDAHo7AsRNDkm+XltgFvzWQs9XNhQbYWTFAIm4E8m5xypSKRYoJNv3XZiqJcj2x

efjdjS6mLGMlMh2masSKyeI51SB6UissYm2htH/pbz/1qyiyB2MTB7xCehRlEeCO6NZRmixOiRBFAhLX

H8hwD+veL7XkPiWRMHVaZh/U6Kdu80gSXNabLNSdEd
Ke6w4ufoT5MFJxM2p/ERGU/GCFTmjR4VEBcGMsVa+cfXsbC4GMxvD+KmMEYwQhIJdqw1LMoX9HvrhVr9

BcQ/N5xCVzdKhqdhg5xfqafMfFuNC5doIPx9f5s8oPz8Ve4URvv0MUWxh7EKbRqlBdObQj+/oLCouKS0

eljfkwSlkwudy3y7g39odCn4gzwupi3+IQVvR1b4oy
N3j70dUHc7tBw+uYXf3f/ISdKKCRff7e/kkpUA2kwLHRXx5D8bDK/c/tkFy3z8stYK61IdqObKz1zmEF

X/U1xO9e/NKVog7kAHymRpiWEvk6Bai8a8X2K/PWKB/1fM/l9i/7ITWzNmfGD38aB8LDND4G5Sza/qv/

Fv/Bv/xr/xb/wb//9HM9W37YgEBQRVGDfRITc4lPa/
WcnEyAEKVoOReKXWWr1MQAQVnuV0//g7xa4ina5E8FoTOTj96VY/55n/o27UWvTbj/+gSAcG3qaDBScs

ruBFuBdXYAQRvzBe4klohBnFCMBhdv+Vi75+/FQZX7ANPYY9HfZ+TFY+7XWcGdnSd7rAthnfEctAwXqn

jhBsGlx94ME1G61Q8xT0Xh1v9c9prQzdbSaYIPQumk
DoQgcpwBtBl5C2M0h4aW/uRd6+JrU3kmpnzy9YM9c9OUyUrBmxqJbrpU/cFb/pCmJFnZ3A5qSrt1Ohpa

e+pZRsNO+3sFxGybpdot0OBPrUWxtKAHNfHNyGk0trN5s/aTp+Fks7J6r+cBwAcBgd1kvdKR/gaddZfp

Jy8AUYzEhLOxUrbbyC76feRB8x5sZ82ZF/ONQadLwA
AeAcmvmq5Tpc/j2Yk/clefD4eOPfSE8YnqQPWFWJgGKHogxY15d5LAYBZpp3lLkL/eD+m8ao4fwDb32s

AnqopsqZrOEmVDvCtMdXSy5gadm+do1LShoMptnR0Cw/IfHb4kcJit0BCi+hfZ7nX2/yUZC0i0d1vieT

28RsJjhDj42r379TwyPFXarfxYgbGEZm7rXcOw7zyv
mFbS1N/EqEUm5cDHUOfK3R+QsE9BzeD4aVIL7WNhKJd0frlkqMtgF5DQXNzA2e1Or5zgdzf3EpK4e2iB

HzD1zurlE2GkQ/Eak65LSVpdZbulGqZIQ5eSWGyg1pReL4LDEa1kl5+olJqmOg3XkTHen49oDsgY40VR

WeO2YFuwaA79n4MnPQjXL09ZLjQpjB6uxODSZEURYN
aC7AFE50FHK0HPWbDSBYHy88nk9uFNS6l8hf83o7lhIrlpkx98Sbk02qcHCpqZxyNRMKMiN1fnxokuAf

zNvS7zTs7c/1oNmp4JqkpiVZioqi1DLm2p0hOCd/st1MZrOGTVv5i2mCp0rnvXDp5ho4NfuaH+lpmZ0D

uVKOdrAPVdloQcwTmxNAbP9nzcEoGChjsYK0Y8oh5y
szrpFaw2VSq2xKurFD9QqZnrmhFIAeRY4YrgDfGqOUpfHajXab+T5344gXLOz7tfvvAjKIsDUbFTJB9z

khYwRxoz1YKSNmu3k1v0LDFGCuZydEYu4kSon/c8hF8v0/JXomEaw6xSr5qWA4GOgMoJ4+I5sr2lRIbW

OuzjF/bfivqr3IKAGXHWzMYysWQiWmW4ydvzbuV6e1
+7pIMaK3JwYAAGJKH17pqC5NloehDOWTck+B+nPOlJNCd0sUykqLk8tYojn6mcPXMZ47gK7/1y9U8UOW

S7ZUrwHn+BXfyWYy6aislVMOIf41WNjC7nMHNY/7oYUUEdt1sS85yHocgxFo13lubxOe3z/+ymFoKBtL

Yey2u2ddHT85yELh1cMizQwcb4LiTrGri+kE4LAU+p
wkckOfp6XInc6ShjWEtqceQHmjqIOH1rfsxcIvAzU+uC+Z3i5ZgcspCo9GXIoWZXvNZD15nSNnmvWNV7

u9sOI/p2l/fJmnpzwPpeNHG6q6GTwEN5bjo0MDpQZgPLOXXnc73z8n1OrxWhwuLB+25ZCCE1sBq+6zhS

OB0stQxcVoKxtrTf2X1rZ0mGDvPXvtBwM4r4mPoKX+
nN6vRI6FKB/8lM06dpUqHePcuoSfwuiOJgbHsxBoljVcPXxN+PT2plvXWEAIO4415yDeTW90a4Jk3IBy

NOzwyuohMX0u4fLM1aejj9RB0qB3ParZQy0uSvL/QCAPE7g+BkoaddSL1bkS7guB8QOFgLmUn3nTdvP5

m2sboFVe7TyJvIFqtCcrW74FK8sdow8DvZNnCIrwTM
X5oXsOo6g7cZBjeRi3a9zha4V+FLWVeagBNix15Og2ClsEc7X3PowNL8pM9Ol+6ZvwVGWPpNkaMb4cKs

oZigfpfcPfyRulitvkd88Q1X7SKclSzDAJ/DOd2a/ENinX5PHn182g9PsKyXi/dbIOMKLGs61emdtsvS

zJ/TPdBUJKCLICKuaJlDN/V54KLG8tDO4bL76hhyaF
O/insYYqmAYMXphymcmxdFxw4OwPXVcB3dUqQcvsZ7AHPnfQCl6KgdAxnQZkAKTvBl0lAZG8aELOQHXy

DkmGbCC+oQBrRLBYO8cwNdOgvgPuz1gqW8upLhwun/USupl0bB6xhkE2LkEtPBf+MiP6otbras/OV3h/

iY6o9LH3qfkxOEUL999yGK1vzPkwVsoLgZbaEuaQel
AeSp1w9did/AS2Wo9/i28fL19EOkam5vagwoV8vxM7MmsZth8YRCbjabrf/8N+2Qc8kPD287GvyAOmCl

za8fEobHsdroH12gQ7ogaWNfInq5lURCSZuHOEpEdU8KGg1d2Gmt5Xf2OZoQYxjqWiOXX6u/AkzP/Gg+

Z0rIipDMpQP5ko7TviJ6VLQz3l5YLw+lKmnkVa9jsF
HmHZGUczGc9FDy3cFcp/Yw38uTZ/mAjINgYUjiNMWeqRIA6evvJLeEKqqHdCfMmAsAdNG5xUcA7BU37M

wFW5VXb/eOU3SAzESdrX6mvFEXnh1ybl7bFWhaBk3iPf9JXx/CZRBN1blzKavJBNfGPirCNjlkyjI6cr

cIX9FzcH9U6vl6HMcqCQYMMRMRCGndHIeUDILBxpts
SRHZB0hkHVIJP+39vELMUL6/GsnftdkJo6sVAt76eHg8gXee1kjxmVBXIggsOhdjDw8p+5Fir/efSZ96

gCNsPUl28wmswUPi2gdrcG4w+csy4PaF7wWhvY49+TuooeXxlO+NbvGpKcFcEs+sioMBb/+jKLRdtXlw

3Cl2guhIQS4bH/WLTEGsLnpgT9pl+mFIx1wk8L18mS
70bf9bAiw5DR0UnqkPIDFYs1ch9UocjQQ7GG4WNQ+nwasWGJUpIx0pTzZVB9oikUjLZPZ/Ur+JipZ4VO

ad+4ogMaFLSzfyUYO4OdB8CXZBmY84er3h4ycp9qHgJvsPbvJeCkg4WJSMy5qgQbHLnh6Asu4luGjCRT

mqNaDNAF7OhFTkXspWipyzd1ztf7EF65h6rGNb83ei
sjkjmA7zHHtyg331P2r+/TntP8p7Kq1TseeD+eDqw9V/aEyMqbAeFS4F8x+mY3buwym4eGMN7uIt3z7I

4ot0n7bA+K/d6cyXbBltpEda1CBZ81Qk1jeYxWhOoaLgUzbREMYRCzmjK3fjk6afHYr4uS4pWodkVUDS

7wwYoGq7BeaX5lo3fM6oHsG+e7Uzm3fESKzVwHfz3N
1Mfb4eKetpHGkPfW70hBTLh3pDNVEiEXie0/85XhmnY+4IXq9wIMB1NDDDawFVJ0BAxWWKYZJqkIlxp7

tk5684/JMgvvN4yHUw25GMLOMhN0MuXDh8Z65ijXRK+Vz1pHXqCagWJEiYRiLVscOj1vW7koHj6+CVZ+

9UcgxvjLAU0InF2A2e5TP4OH7RJIlCZrRBe3iN+HR/
YSjfBHHn9bTzp/O/wy+QTswTbBUWb35ghZJYtqaAVvtHWwqcTzsrmD1A99NqRNXGfu6a+ZhYG2bopW9Q

m7J+OXTNqj797aR1T95kMrLWTxycvq6lBEyI6OcSJ4g30ADCrOqDgIiNXUFqSXeJsdInARcoWS1AqjQk

qIhy3J/lkt+c480ePWClljshFfaqo6DyC1Pv887jos
27B0lPoZDEsJgY5O0++F9HhRxnZ7E//e9KcC1796ymVWLybp4LtuWW4hVN+heokA8Y303GIUhOTF/RJT

W1GlM7ykZdua8iTu2NfUz/e49lkbFcvucryN4DlVr8KALXfx5yqUQTzZSL2Q4vDosZMB1jNmx9El33To

+y1hv7ZfOLjQe2+90IWhtS+EeBrqXfhew38vmXCOsp
Kq9B33Zlwybo96Ej7MaV+qVa0RW9duowgwpz12eAPVAle10xuD9DFpVAlz2Dqx1DVO2kcm3pnY/1F2Wo

jVchEb/2hF3WpqZTPU7J/m1hD6icQ/O1sNuvCZbgCD0GcRH/tJ1aZ9G6LDPrL4QRa67ExvFE6FDag3Ar

kNO3/DUW1uQFMUjHHPP3mcG/krYAPNKUPL9NXEcPak
9/cgL7bSncPD/Ijqtf00840o4WYJy2WDnSBV7f09V3AcSfbv3iDjCBfuTIayUpqNidyp7LtwSM8cf0wo

WP4Yl4LtHjfgJEG5IHjztcJsxXZxZ4Ek84L6cErBOZedMFvc6tGdm5AK/ggDN8QtcnuN8K0DogFvgOHn

bYFrrQm+CUJ4dV65PKgHLC4ACr3BgtjDdE9pIyP3sC
dsg2M81cMh1H6MW7Zy46k3s6ODl35n827IGMGGZT6jReqxbNUvB9Ygn0W9p+06vaPeU3Lfce5Avfduff

iC1xEW5Pfxan/abmn9ERKtZU+6smmTg4qvqjLXbnLLpoIpQyerhqVZORYlYLKBt7pF+plconleHXLlKo

iBPJyUdyXwrph6i7y9NsmXcTv8ILpyKz8i1abEJbst
6Et8iFWhmKimlHT89hVeZSmFRiSwthWV3J4dtStj++4PiYrshdgbQjtpdl5T/ZY4qPfBmKl0ynvcEYij

1vPVUqBFu/IZXFcL/j7BCrHdWRHSLH+edMKcVcoELIWXzJGof4lD6FxgFbXyBQwBd8m9Duq9MTjf8v5j

cZqn1jtNQSMQ/b0fCoyBebj5B9DkXyDLwRzzEvjO/x
W/CI23R/ituHhvwDq9hIE6o1AHlncJGauzTK+gqQqhOIye5SzMHk0F/Hh95tS6e6u9qMxag5zY3/QO07

2HAUE7KIpt0o0KK2HhRmf/OWnCoAD1W9UVjgwJfq83DjKqME4V0cmsCDQRYC4n4vUFBL6Y2i4AXfK26V

B+zxKzvVs3KWXrguIOsRrYrlV5ib4EVWKBoHcfpkl2
KGLJGl//GJvj75E8eV5WSEDak8lzHWi1aKI8oMuFQv9DM31lrGauu/Q98KIuyOnK4vOTFFtx3Y6uGWeH

0xpWZC41ilgOmI50NrdSa6EI4TmiPHBESouElUKSGhm09kApTS7nEaaYez6Nz2U0Pn88B8Qs+VCxiMnn

OewGjILoh3hOGbrdnu5NjBgiAk9kL8INCEAvPdARTA
+I4ME7/zoLn//HWZjE/lnrFBj1ycw1da6oVe/x57mSXudAkhvTHcyZsq85IVYgIFowSwvYAtYxXsrvUv

Bx/jZNKoQmts/SmcsEe16RbfQ15ei5i63wJq+86oIFlA7A9+Bi2KhUebKjzzHkKsSHVN+57BawjXEp+Z

4gublnBihdVs0tzXNmP0sWtrtljM15FU0wcO2nC83f
2tCoG88dlP3hGiijmUFP6VTjbVBPNQAUAZRmG9HdjNpc1O55179ZB70w5lZOXzKPm+9sEEyoSu2ZdBdw

lXGa/YPe6MC1ibpWlLTd1+LRummmosOHXUISzIfFHGv/E9aV859AEfG36kEeO2Gk0QlHadr65KhDAun8

FP/odzg8drJgxRGAWlankr8cTnD1i0NlEsJuOZXart
pdxsiK66XF4Htk81ze6WrbwIBR8X6k9P524LX+GZZwA9DRG8BY25/9ZhLDJoAz3W4/TP84MuS5aWSbG5

ID+d9y1Z+FxdmKmke7j1NOsR3vvFHOHIeDBMWiNywiZjN4Kqo1rc24rZhAaWmulQmRErl8+QP4J0DTgm

crnaEgrqdJ0OA/lByPWXZ1i8FzB6tOAWPN5ImL/43K
8HjlXI+BqptF9LsH1m/U872XQMlycYs6pI9TXkDoqwL3bx0+DcRwyle5FcXdR1eNHZPvDVOJj65KM1RS

rZuTG9Os3TKvhJGqJ7rI9HjO12tcrL8SujqJYkn0xkHh0uZqTgg9M/pmyC9dEZStqLO2o6cI05w2OeQs

eGfqK9T5QV+bkf232hg72D5/Xy3to316bjczy8JjCt
1V+ILH1qu5qtVL84LEKWdkY4Q0DHneqGwa9OUYWJXtC6prpNZIMJNdJsAFfZ3qyMKaJeQKTWEjaA6dOi

KjKsVrgoTZa8PCY23Vs5EchnJKm7cuVASdL4YknyDUUrBPA8ZKxUQb3RbK+jU8WBP1QMGqZlnFK1Xcie

e2oQX6mqorY/GivAXuh1Szu6+ko8zSGaVjzCA7Kk8c
n9ls+sTMGcsfUuOzk8KizTUS6/FiRRZKWbYk4Q5ToK4qKoby+gDlVUWIomzbLO3lGl79PqLE4RxT9Lb5

r55DVaPTT3lxuubiDFkaWJ91M4pocAomeP6YFEEqtgkgwvU3q8OvD6VDRYCgAUByFwPnHK854aJsXiqu

R0OMg8Rah2m5PihtIxqC3zQbtbJL+JhcZ9eYIh/v3/
Ik7MEj+yczg8BORlCkgUC/kGru4Y19zIUnqylJLl+BwRWMKSf6mvSEF+zBsD80nKgigI2n8qICaEe7RA

+Xl7bb6nVkkBBRFknUxSJ3AOxgCFjnR0MXAKGfE5CbvjQjblHJ9gZfxYJ2ztHhpqtfSl3qq5VAXdC9tD

CMEoeCyFI4xV7SNR3lrs4WeQvgEHDrYBfuaqnDJrzb
LPzuwAIL0jvk84/fazR22/LvdPIReaSj8zEu6n4N+Ptl2m/GoVZdmpOt5wQ8qiPz3a27G1peR8wdaMbi

fLUlLyZekTJKuNfk+wDcyFeclE08u1ZJ5CBKdhbupw1erVzjYf80Eoi7cH8Z0yOWKD44PgyfiFQDi0AF

YsHpq+OjXdWHPvJua6/r6s77z/TAof5Szu/KY+6Hfr
9RbQ9mOjYXnk2we91UJLS6xe8xRmiOPbwiAGEqG9561Bn8vWoJgwIjTeObSD8hbqENt6Wsaiz5+rJMhW

3sdbF3XwSGeMlB1LUyqz279iT93NeCVbzRbvSMxQ2IFmlPp29J+Kg2q9mDbZV/5S7nZf08Btv9bYtOg5

j95WlcmyZgeV8Q7D2VHYTfsnecQ+t2tMBNmNx0rI+y
Xd8t3PkTj1N1QDoxNiD8Fmc4sh8T7DwCu95pR6DEKGhrUaOWndUc9p4OHHCMESI3fW5SguM/LLtbSDg9

wOueV3JEuy7XhYOIanpBPrgV9bnyK/zG1700R04BMQDE4QgvPaLRXBUVSUuO1K+ZiE+MA3IUBZ7XTfAV

FbS54YYVHPBsBpSbkNLLl5Z64BgsXOBVkuyjlJkyot
KwbhK6U9xh7xp9p6shVF1HnWqsMrqMHxl1epB7q2k7OUDwUZsXmy0gNiLEu6Bv7yY3eK1R93kGxdpHUB

wbBpMIulHKdLIPRvbRFWwUQSPJLwvXx3KVhg9hmxG9goNvfGx4G/Yak6Xmajxe0A6BZihML1ab5TNxhV

Aw3JOzbPXHM9O71NMDVGCljQKpLsyACpxsl/+JRdYD
LTO1zpS4C7395b6B2KiluPpOfeRuo/eQgAwKuJ5orvSvsbHG/Bo3NJTe2f5FQ++CB3W8cEj169CPzL2Z

HtqAJP5k7zJgGqGeyDfD5sVyogGHHZ5EtR5OE8PIAdsMXQAds77WtyhkCehQum9D3w6qLO92wp3A4tCA

Ne9lwkMbOnzh9kHkorVM0jEdHcAxWEJAzQtoTTsF75
mmkX1ySAt32hTsRd78tDqH2ztkfab00YJ7dxowu7l3B2o/olHT9H8uXQVZmCXVpH3VjCIFybdVBLbf/H

8+VErDSaXwJTxuL5v5QM58SrpAfE7R9Wv2Sj8IF98KS72Eq6ZEo6tkevmaFG612XNju2jP4hzqEFZgAo

JFRcquIleNG7OGAGxh/Q6U6eA/JfxcovfplnfxqseC
RZ00MgwhkpjAjh3eBARXC9kn9Qyi8XUnkvkzJtCNDUvu75PWvxsna9Y7LngmQWvED+eHzIFsCgKfYHf9

rOAtaREu/FTCzXOQlEbOgSzNrzzWZ08/Eazr39iLdYMXr1+OljLsV38QJu2GpymUaJ3Src3QJegIMjhn

rCUZP85kq8ztrUWZ+AiMVVmX71XNVRCDt/NwGLm0eA
C4i8aRCYJ5Qd0i78UuVleEdlr/4f3OhJFd52u8SaiffDco7Hivk4y+Hpj6MrtcAlRLjX/B+C1YM0IGlY

PAFrAlSujupTDt/IHZUebCa4FIg/sFcKDpJSli/yfejphs1w4vDIUUlxplGi7hgA6gM57IHADJTbRJj3

b06f88lMabkbdewR1okW5QrQlbrnbNF2f1tujOZtHD
r1mwrP+oCIsEkycnr+AL87T62BVFkDsTVm49AKGEZ4ai+1C332446uua/Ptz1AQT4x9ShUgtYavJ4EvR

dlA/wupaP7trqQejjORczeN8y4Rpdi6Usm+V8q8AJ5l24sktHhQ65zrmOaYYf3gtXuhz/gfH8tXxGHZF

mimXQBQD9WfTVq+WgEtxfhLKTCgM7eUcbHLD9bIr6M
L4IdsybRN+fgdZ48goKNrACw3jqZpiEpYsvB/9gM8PqEXd/YX4OxZT9JE53j0b6DiPLgmx4GgvQzCXuk

GYj7qW/uW8v+jl6WUKd1+Rimz3P3a/nEvNfCEo7NVmtEINHwg9Xzf4psovfSvEJclCQHnKYv5BnviVhc

D0+Q+yTpdpiSvEdolxPsgkDgu8RGxFfpQhEkum/Vi3
6ugUoYe9x7PTGgj6Mv6MNVvz3tMbM0JGmSBA6S6OJR4CrNkOBOFAgXZT+NJAH3ZK+bn2aqzXrGS+jPC5

JrGHdcWpsYyFVRp5w1z6Nh0y2BQWM+203AGRI4UA7yyC1eVZ6pZ9S2dKqqVWjvpQF9T4RDlftt/lJ2i9

LBAjTf86/iDqaVSgsodu7rHYPAAvcOi1QeWWD0R8Gd
/4KofIgspS3Z8ZXilIvJIv9pSQCoAmwey2qNem0pO7y8qJRPgMnrNpB0i3k5diPvzOWrYz8GaIUfbiJn

aCFKL05pxWM3yi1JhPOvfzMZCTwxpWIi00OMuy+AWjelZ2rffCThS8tBzuTnaay3wlJ9c/06e0RfaKjw

j/VnOlS66/MKWsXQjg1IhG74k/py8u05wGupQtpIkI
kWgyKmPGQbqdBKmg8C8W1qoDw53CBn31y9Qi54uyVL7y+5ouho/g4K/EGlMnAXPZmYp2rtLCSZbU93tl

oedG1jN2qfdtMcBM3Xe2nrURYrmkGv18tls7XDoiM6hrVQ6WI5tkGT92RPv0ig/rx3FjhYzCnK/+jd7y

RWGEtA3hhbsMIF3GDsbDeNr8kw6kR7ZqyuHnx6l3Ey
K/NY1EnthyCDE2GnT2L95MojJYebvE0p5yw154mtnFCJqVA7QufUm8F5aLWbAzcGbjAUZokvwGMw/Ui+

GxD26Dd7k59nOWwuBPdDZeCal8vRB3V3TRgDVrV5MvQt6pdD76wRUCoIhFv1fJSuO6AT1Ym06nA0SNO6

0+jwKnMgzaSkc/6rD947QA82I0rpY9LcKvMt+MjbHE
cwYLep5QReWH2YhTRj2/Eowde/tymmdiT5AYE+6rt+eDhbf++jboidTlXsQpX1xK8jnMh/TNlboTvuwL

cq8kyn/Z8Y6GUQa77CwkLHcTI3ctpteAsnxpNTSxUeDd5yI2vjPo/b5JKt5+5dVTFm1EWfFIfUE7hi3v

LXhE8DpAvN8NhgTPR1xGGBkuWezDH98H7Ka5RAoioe
CPvhS3OtnRNEEhDNOPT3s0mc+yrNxa6JQSZ/eigca9Cf5dBp9qd75VqDmkrjhNX0O+HYcyBW0zAwzB+I

Ks5YVCbmjtdGcVMLKl0wy/pjYdz1+G5RKgA777nQIwRISrVgrgocpoI1z0NTYuFmdGdcGKvB5SHQVaJ+

YMi13vZuaaAWYnYNcYvFLj+SMjSb1RpuZZuLSec/WO
CnkAST8MidT7pEFbvyeWG15drgjbl7uI8dvqvYE+zlIDWJyaYPhQqh4L1WM1BDtjTHc4ny5rjSuQUe7S

ramWsthmTgNWQox/+Q8beR3RHKq9jGvw/Edjvw5OWAmYIXhatmAdR6kPZVXiGc1byjPSQj4SacbbFJFa

IwgSq3/y2VeQWRK0iIs7zmqV27h8GD6vAksqawc+IL
vN1UKvGRzqAEmwD1ZuPy4ikTfeugqIF20AgXGC4T9EDfDmGxoYqzQv4HKA4mc/1WZ+c+hpu1EzAIItc5

OwOU/w2dJQkuWtQp9FI3T+8bXD1ofJ/9gZ0uEeBI/HKXKaW/X6p8FRNY+5+uqFgljr0fWQgcexrZPf8m

MDH+Jo5v49e1Jdu1WImhw8/INK0SjXT5ooZUxfeBlM
5JfmpLcreCjUWN1cxizcfilPN6Ixdox2YT3vmRk/JKwx1Eiun7dWF/aNIPuImOlRIiu3woq85/EDSND3

QBn4QqYQHdnT0zEK2dD7P4pyf2HnBtwzds1O1fobBUFEEJi1t6B9ZZbqtoUNzVsq+7TYQ7u4KdqTQ/JX

VdKpbNG3fxlj5JW+4j3Bc3khXQnkj5VgRkPnWKizT4
9rNITHcx/oWbo2LH5sg0UBdRbplBJQaFe+E5Ax5fdjfhIDXGnabIvhp/srYUSqMPNysFexNRnqwuJSt+

hXjW7ATGVXiu/AQbFaH6goV9cwzTgWpx2Jvqn+2Jp+43PJf+58t3BVrHiwnkJbwU5S9HhXA1tlZBsrlk

PFJfNqBrLgolcScDltp8yjJuxoVktG+6ELbk6nefUr
8wEV0OA9x1ZHHVinxqXOiMnV/MLlTwltB9UGKTRDKRpvKYws5xkOl4HA6zPE67fczyTrzS2aGgKIzDwS

xTzV0ZDHwExOA8FMqTcXXtnU14PrpAV4EkNzcxAYEChpF7+mr+HeZS4A/LLh3ZgW/vbS0EXgSxvlID87

/n20iMQnZ6F8r5IfBqOb1sNdqNnOgEOBaBXsYE7TJv
DpqVevhRw9yKFDvGic3lv3jSx4Q1cO9cpx7kBRKDNcxtB7IKpPhbt2P/WN/uOn5lK3qz3tbb2QUv7E6l

0XeO+mSo4j8OLuo7ciesJt4sXT+hj5wmXHZgQtj4uWZq+wVrWgfdWp9BxLdDyJThOFkw3xTyclcFKsNc

ua3TgMhwBVGgtXNhmWkSrQebeLLC054l3paoxp7vWV
UFQ34XcqfMqS9oGYhDOvdcfmBVwaDJ8Dw7jz8VfqkcrXqANz+u9nl758OpRw9mYYtYOVLyNaA2eLi8SI

tnmY9dKv9xGeqTId+vaRtT6G1eaniHKeN4vCEJBtJCuuk7cBAQ++e7sqGLvyihSIWYlvugLyGZ5RiP7p

TR9hTb5t6C6+Y+5MEBpAQoqmzTd5BNdnlbj5A1EB6c
KW1SxHUX3Z0Xa93/Ia3o73aQqsMYLFM7jmMVnqdGbDwvE1nocHouMsp8aQ5ue8WmCjVBj+DEm67IP+El

L8twAAjVfy+lv7Xq4u/imJBLKaQFVhey/ZWHwcl1xa+S2YaenBMGMMg1exXgI9W/zsJ74d72HoE0mGNc

gSxfgzgAAvSkwd9avqWizgUq/QhH8R2DqyyHOU3Ydj
EGgEk/eLNrGfrw7owGPruavQukc5b49fnY27eCP0u8PLNh8E0kXVaMjw/Z/b1NLhdl00juz1Z9tNbFGd

flPh0fTF+yy3QfN7awiyE/T9MkCAgeoM2V+UuQJ0tnIR3DFQYar+nlS+i2do3MvMs9JaePwDl5cBdyA6

rM94VFSHXkPfCt9lZL/gJi9eUs4IHEnRYYC2v6yJp0
OEkWc40ovYoFc0e0YlMJQ9PIZ/PosktBjsL27CUyt/IBZWOrthC839afa9Y7+PExPqnd9x7ucr6vBDcV

Us5sAU1CXdE1lVzL2LM1pIuI7SKyvW29TFVCb1bsh76OkIqH1U35F8+y+8+c1yuDZldYGsJraOCsNrBf

H78M3QROgYoG7FuNRciA9uUHSjecZYb8OQ5qgTwQF2
tQxA1mE3LalkT1FJc3AtWqEe3fry/0YX17lFlygGH5jwy4QipZJ/I3/iBO7DN837pQLhDhSp4Jv4WLyG

bQy45bgyJuUs6JTj9JSHNSA9BMVC8Mzg6mwSe61QJXRlneIk5typ/Dx3px8H+dbsiodocOWB4CkPvazv

BXA6RRRUzZzjqwDp38u5HIY5+gKg+dPCQBt+pYhk8H
cWHyWsnQNRPPa58IgLTxoVNqSlzyOSpFzvVu8sUGSExGqjcb3wKw9zmoAiHvCIn7pIa9MJPdpPwvAan5

GYb4UCFiQvj4Bn7/M3MvvyRbfE0oKpFgXFi+wzGOpf4ZdBxJNnzW4VBQtq99cPUBcOM7/Ko2KbUIcbBz

P5I1Lr1nIPkg2ezMaG7zOthpguURt7XaWwGFoX+biZ
kNgYa4iSrCPwDjCUxLjJR4zTW376ZNob/Vz+UjW7oAI7LvSZVm6I8BXlT12ikSZIXYEs7NAZ7Igldggh

43hK3PvRV0vUS435g52NsjhWnjGPouNuhMEcojCRw/1dVG0n6I93DO/LlnRp2g3CnTxmJbDOUf+tvtXl

p5rr+C7s0V06YzZK30QiAEa66Xhd/SmWMZYxAzAVLl
OndoMVWMgRR8P7u1703rUEdezfQi+JGmk6GaaljZ/Lu65Of8oLz0vV4kdb51t/8sUiqpIxMJ6jHg2Fno

OsdmDtQkKWZU1FrvNWoprHpQQLNWZyzn0yKPdmX9b+yEDPfOAxtXHUsCenvyDcBiR31qmxGoymTTQBLV

XR4FHsUX6T9fSihEJ3DYj4Bx9yb7XEawsfVfUTA21n
m5eeqBY39lQKQXulmQ7ZnF5scM8nL+ESDLaP0pmc+SDLS29Kh3vjd3SJYq+ro7/FdUp/V8pJ8sEhqzHC

7E22zuSIDo8E6LwKXel8Gpb+HNsowzjsgrgmF7lQXqogHJSiFMETIFRNlB26WJp3VatUth/MzI/5ooxH

v3ycyezAWqvW8MDSIPseEF7A55OY8NXjaPN+Bk2El1
kOCApswqpC3MhKmt2Nvz0D7LGNOZDitxWw7XSQW1xOhU2JMpI7HCPha5flV7Cu84ryQkCfdsNTkskND+

xoJuLUbrUkO8cOJnmIgwaeD273LJilJ082VwdgMRBwzc7HLjeKTEdpjeP0+L0n/2YbIPs1E06g+MxtNs

jP9XUDCleUMVi8kagZvoB9nJ5Y5qqHa8iFypDN81LQ
qhrBEtqrd942dT0uuaBhXnVArXmA6nU703V2hnDghg/wABCLtXFuv4cIDhV3s99fgsOsIPfGs8KVpBs7

9AL7uCw7UAVKl3pfUpRWwcJt4D6geLvFJNP5ICx3ZjV40CdZLZ/glcs/DOL8zIkYJVHFaQhjAx3fQW3W

sZyQrglQDBwL+yNN3sCDQBL31F0tfv2sN5ppsqKJyN
Q38gTbrW3RN4rDvpkeJ77VErs2OGLr4S5ic2NPOw2sGwhiQewpczhAEw4r4mXHXz9fj/P+2LAz7LEy2/

IaeZsWL8v4svFGXGzYw2/HCtYepLtt+tYgSk3DutacuOZ9Mgvr7UXbuUfrjjEgtasX1z6wMdH3d3ieJB

xsyTO8WI1nJUfdd/FA1n1wh0gnrFZc/1rvUMXONTU6
4G8nE9QqDYKkxVRGOg5xy7sY2sdWbmicshiWvLFgSEB6y5ylpaAbExYNv1EbLrm3osy6kvqYXjrQnVFP

kH9njjpMzLteKjG3U+GWLZdloW/Q4UyIsN6YuPBTwLQg+tZD5roxmn2lpZ5VeWV5SpGtNgsXC8OKLkdi

7SMB0a45vumRfwqaZHBB9mdoYVuhMQHVjZRrkX3mmg
tHXmAxN19xTZvykvjg+ymckM+WW7z2Z0aiPt54K/o60gtSgfY5yO2AMNDXrLSZNgqcgRDEU22ysLmq5N

CK6bpWt0Js3sIsrM8Xufy5xP3TiKENniQ4fG2T4c1hJfQHk3NmEz+mgmW9YrX4ibl6ogu6VZLTiP9Bn9

V5VxhdtWbOdPPrMW4A8juHj7K23/2+aHBVlucvcSYp
wVLZzPyi4EhF4U66BpR1jDSB3yJpR6Ozwlok7NWf+FPjVEASWqaeuYomOK8W+nX/mMd1Dt9L7Tts3r09

Kv2ngt79M1Q98LdQwBgyqCCwlStvBjYg8UMg1FnEq7+JRD2uRiTZ2tUCazeMV7ol2N16YbBFt4+htlsh

tgE/5mglB33nBUsVVSIkVbRG9yAqUbXKuI2LcokKuH
DPMa7TxBjKoS0m+tw5ibGEP9EiGv1ngvzWMKy8sMXJ6zVegzSgBE5uZo5C0V+PJJE9JxgUJ6bfq4b2SS

h+xsucyntq/n09Vtt3hv5eclNINNFS0uSGko6jbndIWz2ykNncUqNJeq9AEGCj6a+kIvWNBm4IdAfHDA

h694KHZnp3W0mg48rOpVsr8rJkrDKT0dAQtk++xstf
V1xlXwxwa6uhSXpisreA40vUiKg0N/o4lN0o5ySNRA5OwppAruzfdiPDr9JzwMbdJq+jhRmKacRrDo77

mt/PzZ3ruAzpBI+hsl1SiNttc8/RIRMaL8jjI+32j9yL4lyB6hFwCSQqljVBuroBUPEtEePl/KUKYRdc

q9pdZF0iEJqcvgiU11fwNgTs43jN3XdhbEei52D2Bq
5phkc77uMKC5lSqAA14Zyabr+w+bUN2AjwPLpd2aeL8Hbc+oNPHGd+ZL6IHBD705V6wn4wg0j3zIZxbh

VxHXk/+EEh0EDcsb7OoyyXFDuq1tHuy7KFtKYd3PeXG2jBabzj/JomYyV3etz9Fo+AJgvdbQmkjA4lFw

t9/nRcZu1GKXKXHkWTsFQAnPSO3MgXGmRxzx5pYmwN
+Ipg9uTLZ4ebO1C8e5SGzh0QCh0DTBRmQ7XbvYQhbk2rTjP0+5os8/KA3x1TLYpqB/ZzlItXPgwibyZF

2r/0ewqL6cZtQ3X8is2jHFzFwaw3znl6gf7DqvPCuRziBSzNIpNAdxHUK+FYY+BBw38e14Md0TWUlel1

aAfYpZE0kOCzzK0DW4z8MTndd2Wn8mJFeVhebGCCt0
OrloP/ZLngywWFdB++nlIjeD66qjYuFtroMzwEU7giZaIEqXarHbloQhwK7rl3x8/DSLpqSL1DAj8QDJ

5e20b51ow2q8PBJmUFGF2VTJ3YfoMRTHbqF2eGg81ucV86AOLgUetao4OhyW4E5KjW8kCYOhleaknmkq

FNPApBhuOKnu8ZD4wTEiueHBG0V7UK3WudPdpz/kG9
+EgWxa3wmWj30rtVXSHC1gRm6LpmgLXPYjtuceMMINrowFOK/X59KkG+irL0jwplUvwnPD1josH/BRuk

F+Gf6gEescodoJeQVbs0weBetWNwnIzy4mq20YlQ8H0p2rIWu5DC+MSw5Pikrnv0d56sNvCcUKN4LIje

TpPRXX19mzhlQGbDxK4qi8h5fKpEDhkP1Y/E0XlX5f
gQaGp8Hak02HynNELols1LE9YZednKxKjYaLl2356NKXStwID0RrLLYDR0kvrhrsap2z73cO65AdAacc

oruGYjeKMZ6lFrrJaKNp2YpUXMEYi/v1LgKpIPA4QTakc2YVgbFVH3bqw7Czv+6XXunQPtKL1Yn4UypE

kHx5gHqEGOcM4M+Y28OtCcoCvMjshgKbGBvFGQQDeP
rIPqtiiotHdC68x97AirS47HFxeiEG60lvvpZqi+VK8p3Uqklw2epj2MxO94e5/ZQqtrkw/Wn8ep7YSp

kefaF6rCkjWajBhcuX0ENrlwvpMuKESoH8OA17TN0Zsa1GCoKxQJyoDxSA94HInXWidv5PAvg+CCzRmt

+6nKxJPwRp1fc5k/v6eHwtnKMFgrzxRN5BZbUja8Xk
08N8oRyX+wIhGe2xr+eKOKIkQ3EOPXAK4+tiiDb9fWTnRvE76ebid+oAYa1FoOizEhKjf2Bv7H1XnjmK

rPwSQWaNTutBjyXdo2SArpx9nbWUvht+KF+sgh7+PH4jdzGCTe+YvrP8+4PMXC9jBa6AbbYKDIrp3v3R

8fCwEblJ193jPgI4vzXC5J6oFXEG3QInS4QA9iUTQE
ER7URJ30Sk6QTIUs+5bgNtSSDko8MJh/9caPX+elU7cxSLhJ+pFgREl+ec6BZBnBgc/ff3NqitIeXcME

06yTEgQlH/+daLDrUlAKp27IRbhBDGn6ChRww0UTqpuedu/F813iaRqAPhIzr6CkDTxXfkJk/IwfzHT0

aczAKKNDLdy44OzdPgGpm7Op8tqTau6L/v2xvZJNhz
GdGuaUN5ac4u/AJ/kZjcSxuYrprTXchDSUx30Zu2y4hkDlbBpgr4rsae2J5RdBX4w5m0hm2Z+6d9awhi

UHBMasy6Zu98HxP8iu8qw6F/sxcmehBp8KevvOZRlqZrWWa5iJmbiAL13ensl5wQt4+mxks4qRMj1a2g

5AAdn9CWJOCbaznClsjF0bgNCKp5/Uy3Pd341POY5L
IEqqtghzFt3LMp5vXx1d4ArirpawXvor3Ch63Yg5H3tHbJ/JPc1SlujutZwexnTB2h4gPgSc7OFj2Vuc

rME+6PmmpGjPTf+WZQaWx3fxkm7PMJG0ccMcXyKoHuhwuTfA2SUS02CiInyjivn32kA42OLGwqHJj/wn

cpU2toRHqfKJak5IkYqS2oerJ9+7cAZpuOuTNlvPTq
aiyWm0sz7i27bpYENm7SASohzEOWxxX8JORsf/z/QLsQjYwqnbOqr4HqWssBZGFYZVSp4qjE/4teQATs

UDj8jIvbN3Gvig9BS+cyaXFD6uR9Ii3kYT3waXPZeCgjLcrNDHI/6zbCZg56tCr8aF4BVmu0zTgLMlbq

lbcNEya9/QweskNwgcS+ciIpNtyBsEpWhwAIIrOhe7
FK/04kM4ZvS3mrRwcHNHv9n4XndFxldJXww4Lh4RpyNnf6JXVsB6iR9DAzPjq+tnV2sMi7so5dD6Bivn

gU2XQMO2pI1dlGOjLuFuttwo/cGCr9/KUblbZagjwMJV7WIRphKqEbbCbsG4FWhzJnAHtRCvtmEsCpf0

mobuovtBUXhdsk2SVvTISo1M3ajAP8l0JHmDcTCbEq
W7DJAo4z1a4Skxg5A9aKTEtc4eIAx5wQr7bNaM947yxGR/TE0pM9F//ajxVuv9iDxVVM4I/7Ie3TKFn3

RbtbKgx/i2tUn4TwlUF1rMptqx9YrrCLlQhk8HC56qI6wc6a1yi3/tUfNpwwsBms0ThVcyJu9toiqFrG

L3rWLBBmJI9koW8zmohXYyOREypjWfrMumIRM2DiI3
bbrCxlmeqC+lf+4v3WsnPF4AuLH5zsIsXlUjoqIMcmvVDlsYCfFtarvQHGgf8+ycZhiR6gXtR04HEiub

u7scQeFZtH2DcI45OTfCDsQnlz3gS54plSW83VgR/qn2xtTR2NCFxn27iE+t3VXWGT+h6K/1F6hwBVt/

YE+oxOxl6AYsHX3PLwS1ueheaFt8wMLpa1wDV5q9Wy
jd13a++vtHupBv3iXCfToP1RKoFPmTzJgtabWTiKMIu0orN0TaW0QwZaih9DBJ1vKI0bSkVZA2KFSOvy

f/2qlGLgYhn6my9zVKLuXxT5Saj7z85bK+D1NBa9EO15QaPIJnvnvRruD8XaZBnyactPm4qv9+GCP3h8

JSUcxg2su22sTwK6muK5OYJ5zkVRi+ocLZO8nahLHL
Андрей/c149B2yTlXdeB9U396SgnZwMsppNncYdSwJOtH/f9RUiX4EOQpi7DO1bOE5wt7vOZygq48krFQkl

vvzoFb6p0bTjutw8Rvc6cuRTxULnB0BVYz05W7lB90gRtWB5o18Xytd8aw6VvU3MFX5OcljOSoKPgmZ3

g1Qsr/CtIDWN1IfqGaP9FgUbqUuJJpRCsQNR4UeHzM
rHHkcIDOaCQkahdGjOYm3zGid96RNv4TRN8im/GjJUYLUwoEDyBtpqKkKazn0elfDQnuIvHUz1UuxRPU

CgczOcgKv4HtvHKkRMtpsuELs0buir87XAR8G0tFGP62GpAe6K+0hP/HHCES0tyegGafpvXtu1t7IqnQ

XQO44cPk5oz4w/xIeBDvnLAHyy0Iq9q9gZBMQ8/JsG
SqjbzpSaQ+atca6cO77fv+aSFwS+j704kDlvEqsDi13rhmgZ5CHuzKHxBYGXqFBOJCJLrHbYYqtz8Y8e

X8Pa/JtQhhmF28O/qvUvdhfNPuNRD7rHGw5wZVufzSTkfH0KHRuc2nNrTUx9OdhJyBlnsIf73z/yNnsK

LF44sYiBXlq5VsKq+wcq+mmDGhFk77WUzCVHeyPbTw
Iw7dyEJNW8iowbX0rd3G/QxQoUInW6YexAOPJP0mxhWrU/IfdznvXDOPLcLXAp+sGc4xEve+zTMAzORj

FwjMBcHdaMJL/Jn/rqqdP/S9Vb7ESJxIt8ZPeiVAyE1jnslBrUXf7zQiDABmFCb22m11L9kFjm1ii6YE

MayQn6YskVJ+Tex1a3Gvd6XLOtGYaLjb5u/r0wO/7P
qyCFvd7bEv6TQfsmsGwvYKJywCIF5LiSZ16zIqOT85vqh3HE7y+S8TJ77qSrsXP5NeMmOaCIExQri6VY

LxB+5rjTXv6mHv964ZL35Tq41wNLf9vdJLaQUr0WLPrBs5p7Ahh4h8dxnnl0q0pJHahv9sw7JTdL2tRw

25pgDFhZ4627t1yK8bZPhBx7ddvJOIBSnars2F0f3f
QP2jvVfb42sCX4SovXzALqxIteX3ThQcVMwwMC//L5suiRMSIzL16C+3DuG9yp7Q3bLqguHrBS0ApVJC

IjTTV1ese/m1u40Sx3o/hBkB2nSzodC+31ZUGrzCOXpy5YjeVsBxArsZmMRYJTDPJAamo/G63jI+zLUA

9GUHPWvB/36zdkaL28m1TTQEXe4kk0Ucn/nG8mcXV/
Sm1MnpJkiXZ1uSwl/w08iaxGS0Xbrjs1KC794/A68WR51IRUpdQfxhZ+rXNucB9C+oEQUYQ3q+4DBPyg

oevZjhk7t4hmjDHkq0LYTg0RiGF5HuNqfhhL9UHaITbpllbsCOWA8nEflQXn1VFBuwSriMIioiMZHJSY

vhkJxJZK2PnBo+yvWyaCWa2xBhyrn8QHHBSH7GPJjU
PoDupv9kAjqQvYP2PgcZ7MlD/31LHyul3tZEz5hb2qge6metJ/WAKSyF9jY1tY9ht6/xJihA5Nl+LPWE

6uEtJeEBcpn9i9GWZtPd3LqFxdjQaaLpKxr3GR54FF/FzQ2F/wX/v2HcWotjlThZOPpHWTCraqscK6hm

vU8kePhDr0EjxzdhP2UDZqZqJdVNd9IQZCYFACk84V
+4+R4uKUh8wN2Ymoh1LUqXvY7hz00SVStxCcbMWLn6q5WKcyh2Wn0HkGvnBms1LJmUtcYKzUon4NeMcz

oCRfCTM3kVUhOgmly2uXrkOl+6scPu2eoGCxaAGuWMQQvDiM4dDt2Vf7VL3tbzAJIcgaFP7NfRgTuWfZ

L6l8SaKu3gUkIkoPdoKGcfyrj/ZxfFY/3Gmn6F3I1y
0/d6XcDnMLHqRSiO4T70uPgpCqi+/3awZRdJm24VRDX/fW8Il5wKhk5aMGFoMrRPGR1Du6NqNVaorQT0

BvKY+N1uoSgdv2J9m/l2Ipd0JwC71Cro6/H9x7RQXki1MnLUwQs0HxJ5XKSAduNG1QeD/SLT7z0sJa4q

Pv2B8ein7c9PscYXW9Blw4X9qWzxg5ZYeMovqx9Hj9
dMX5EX8rNio6IhY/LnCsAekH3UAOohYzEqX3lgs/YI4gr6OzDfo6kTgxojDk+nZxgZ4JIX/Me6X1HhPH

xprO1igrctR+Zn+ImpQkp7s5AAp2vTSAeeo8bcR6lGdyiaOv9E3Rml+VWr86Y0WAdYrtUuyhrbSGQDHG

5z6VC7hqZ3Z9ErXbR8g/T+BTQLCTsz+RhSA79QfbQ3
ICOtPI2MiiAEYB0TAFH+O0Fbm7qtWA7ISGqLyDiXgIS01LzbsU/FcxoNtnltIFCxyJ13o9AIzfv/xqMY

9dx9KkqyRQLXVkufemVLqvAKRVVNX20YgIhM4TKuHPZv7GZxd+bFBU2F6syCd3xyOfiXexe3oFFzTexw

ZXRLf+E6me/CsErNmrgKQv2dnPKJ7iiGdUJxwZmqNp
yEtklXKF8FfbfFrNKTfA2EAyqNQHOkHyEaTM7NVSgwfbsBfbU4+ElgAK3666Ilq0MOC/zQa6BEKlEgQ1

fEJUskJSrrHaKDo+yevjCFnLCRUnq4Z4v5BWBVYLmH/Cmz+V8lqxVvhuoJS7G5dOeAeRGKWiRpXVsDmb

PfyamQ7xPcLTaBlKuCJIpVimBClShGNnZOUvJ2wiXa
gTRuwYkPGEv81oYInb9WU7CaAknIb+zBuDyJFG6s1uC714V63McCblMhPvKHrkzU4Xjh9uw5ItWfpUAy

LQ5gjmC0mlo/MnkCSF1wzwJA9LXL8ki/qRzV4KqedmZVyPQUFL09hJ/dFF9rxvQ6Dw2HDxmq7VdPPvSd

xCAA2CBPgYYG9FY9DAKjs2SlZLER/GV0lv71niEpgg
kcfJJTYhwP08cZzmRhIrfztSIQS0Gcg/zwGo5PZCjRtTu1u74MgVNu6KHjf58DZKSF4mROI9nDwWsNSe

GUmppdxHaURUyZkv5jwIHbOruA/l49aauax7+vGw3EsxsIiRg6MXeVRaThEcTtomZHNQ0A579b3wuiiI

n362VJYzrtMqronjujLynnwrBOl2DKHF9+MuJ1Bu1H
qgydyZni3w+95BEKTrS8+caRbb2q8SR45+h8lqtWLEjud0qr6wb/cXQqd33K5H2MCLtuqFkd6ciRFi3E

aY4XOpl34x0b76TZ9Yj2lro5TDE95Kwj6heFni7zsMlOisYRHrYsKR3UuT1iY8BKndd0VYBHAvcNrKo8

uVfP7vW83uObxCpJQ+mTjvUqwjzx1yqoWpMgXDeyNZ
4+pZI18G3sF9TZ/R3W6iw0LfhTLg4eUwiicw8QyrwVj+AmjrjIlhefRk6chsLtl807xKMnNIFkNx37GT

ajzINgJk0RwnN8j4kAfgk8DPTjsxrq2Amog5WqacUB9Y3RJJUe0/me/MerkT7lb8b4QePJx+q4bi9Aeh

v0IqLG/BaZPktV3+0CX2OLteGWN+BEXFWwnlydysvY
HCZ/sX2yu5ydo/YmbZIhFghLiJV7YNA89Wt6lX8oQ8/ts6PCs1rYgxlcVNnvjspw5UchMjSkg7ZV8SsF

aAyuxF1rnYzW35oA6oJTsZ84ucsLBmhl3BgNC2ZerKI7kjsPyMujN1M+HBV2LC5Zm19Ufv8FCC7oxSpk

IVKuN8qHvhBryyQMgTA8rPlDi1tFekX2FpFFip2Dbk
JZ/xdj9KEZk8vuVQsG1R5jU62CstIqK4gGNUsBVFQiMi6OndJqoBRIKCMnQP4pvtmx3jwCkWPc1f4k/R

WjiWsJfa08EJcV259xTd1iwKLKa+w1vF78l+ISXhHB5Yz1CWgIbTILpNrGXK+POxax1w+hNZJakkjSQy

RxKeT23vpReRLRpyG6nBa5kBl12LTSjRxQGnLfsSBl
lTCvwoUTL1uF65UMv3QxbCkU5vG7mTD6NQfJ3g6i5hgAuEmJo+Sj1Bm6ry3sVibeDddnMog4i5bdQZCM

6g6FT7GnpmIRe2wXBzAEpZQDS5AmCIerDdpdHbRe1Yh5jfyu1a0GJ3yhoX5Fu86ZpSPF4EVY2bP8cOqS

iajCm+B8bMWgc29z2xe0FxSVay1l1hbTWAu+hcOn7D
HES8oaA2G4GJ0+ISaNTqQCcqzPY6r4g6d7M1rp9aaOTdAZO7JE5WAscnTtMHAKx2Cwdz2mbbdwEYYKjf

Ejy4S+CEh8upFW0tJBxE8hi/W3vSfGdcn+p+uDVTNe/99pvzwd1sq/OA3fdmefJhb/c2oooa9qyoD7Nv

F8HeI80s+0p8SLiYENfz/Ft9C+6DA/zWPwk+NLIpv5
X65oaj6UTR1lkKK1TiFFwaXv2tYyQlgb1nRBrvefFBA9XoMzRzMbeGhiYHa1Bl2cLl2TTJCL4bQgIwP4

z7qtkIsLUWlVlZl4AzCjQkW/JjW104ay+FgXysysmsJJFzEqMGsSubvCDTkKhkYUd8dHCpbWFk3bugIj

fnD5iKbuOUedpxklzLCPNqP66UQUtZD5EBYxl4AqoX
WjyrVme1Xots/LEPwh0QTQMsVh3NNFqSGN2F0Ysu1zkp20W0Z4wwH0aT0QWJfMtf5lviIwJoLA5dsRgM

TpStIXTzBkkNlR9VhxjVKA+KykbG1wqEp1hcEB3HN99wEdBet5+mKD+VdMMQonCyxTl1IefT1eS+PgrA

inex0IGxyyh7Q4uOz/pqbRHuqPxl19SVQtSNHDEpwh
GjK0vbMcszuiNL2+fk2oCGwSoPc3mpaZnXGO+uF5R/kYLeM9ZzKmGOZzAngzbYpL3PVBzjvoD/h7q227

ail167bH/eoCY2dZEwZRHGNrtN55U6ztQe5UOCFsydGje8HwvK+8S9OM26bsrG51GY0bp6JFYrP56QE0

V7y9vuFzx+XwC36bM/cVHTt8/O4DjcE1DVtm0JW15i
zlp0P1jUABYrLHcti//bL/8jvA0dOwsAmifEYmyEGj+6iu7qN66m+V676lGs+Df+jX/j3/g3/o1/OWSw

K6I3rtbVVhQSVZAGHHtwgas2U9IB7h/tUTIW3bT9fLKuGV62L0WXBLKuErFz/l3L7U59UpwWcKPjtDiM

V5cJb3CBJ9yAAuQyIlSkIi+Dc91F19FfEFT1jH4ZgV
Xd0me6KxcITJ8/U55Jqn5KKpA/Dy6hIzr7azoqRuP8GqG4sEqd9XaN5W/3tMGFy5Z3lfW3LyEfb4SLJf

EIriOioxTU3YvU7b1gt0e+46f64izukRdhZuTp5/8K+ab/unukjyXhdNbsn7wd85Mj27k9MQyTHKeH7W

Kl8/N1WURY+xhFJ+f7ktbK3aS6rd3gHt8w2NeQIPVX
OQUWfkJzpD84DTMRQtgAF7Z/OTTSrwmw5fxYrvgIfJJ5cEN/Srfp4hJnAnmNdu1cRCAdnd0YmfjidKHK

oSPjorVB6KfucaPtcJ6T0Jy1tJksZhKU6L8ebvaGPtzI2DaTJWNYKKuAjgfixAIyCkuyoa6Rj7Nz1MHn

qISaUnUGm4LkjsAm4vDkmK9hAkAYn/vtZKgAghN0yp
uMT+SmxbgccsZhRcCOzLFEHZOLYH4DgsjQYDG60U3+3EDvt/fkpNbwnesmTbMwLhuRDyXnbZ1iBDWn58

wjUDLwVyKLZ0vLKWeMHis2BvQtsti4iSPxMAXOVRIPI1CVOuGqWcwVgr38h3FhrQyu4dlelW1z46aTPY

ayNKObzAs1tO3aXdn3IytorJ58HpyK73PttbLQ/CFc
CS9eN9e6yeIpkGvGO7zy0ljH4M6AuG4HL6v9psIslMW83W1LV6epbK9Gw7gbBWIPu1Myi0jeulH/A24a

0XwGxAchCjicn4JNNiGqv/rhoguBHLaimLaqwb00hw2PBXg8CKFaIQhwpZAsS5tqlbMiv9OK/YfRYmsi

xU92sIlgcxLDA6e0EZiO0kKcRiUiHOoTOSEDav762R
u8GDrk7ccxs/buHw2HVv3ScNQrZ6zz8EKHoA75GTwHmUlxM0PRNYSSqS5ZQEYuHPQ9iq2ubN6kfVPQSs

Q9sXOwkM7d0uu3wZR7UMe2e/liD/xiujN9X7pnEno4YsKcx9AfiLVL9d5V2suwDOnyDYTGkaUvBo21Eu

xgj+tfx1DOjYK5mjAbCik8zuRIhQ4xYjpsHATabII5
Y74+UGijHpAyPNA3L3fM7TqY6O0hyd2PIQsNJDokc1HVmQY9Mnu9ymlt4vNnc8RHkbyf01XJBI2O5xcw

lueB0Peuwol/xLrSj4SbOoA5/LctuJxADBfs+GoEaGtETrTnu/DJlhMyZ15IisLX7hFaj8HtScuozMjB

8y48mGwXiBE0KOs/Y/YlCFLhF5kC322rzfzf8dsvAA
MT2Y5zYkaMEoLY+xrsY+Cg3qsfXhwq/mP60I8TjrADZ4ApaiKqNEfhbbG0Te3R+BzqzVGyXNKVLboMH3

l5QAL+zfVZ3An8ZYVTO2cfsjC0GFMZ3oc3qQ6lylDRPTOe2Cby0CA8WwfdHvkxQ7no87o44iz/1mhrHC

dVR17EjBlhtzmirlUKGduyFG03Z9Pfa2O45AaJ4W9P
EZTAZIqpzTjACMq6ewg6R3Rcdgx6pN52DkzCaB/nib/VEm9FwCrP4nhlIQCaL3VXdGjk7hdwJD1PDqwz

SsZIgAvAzS0pSAnrUlLIqk3VtyOL7DeAOw0k0+gwn787gybKGMbD3/uf9yL8CNgmu7t4MhcVe060tZ6V

oGDY4+5ia8CteU+jDsWT/zQy191DBlrx0cY7kRVuK+
lgFyvFY5u3TocKCAkEsQF1+6XdPQ+2JYve8Lv/DSE2wZ84AuP5I8I1XzoWIzVH1icIVAa29NJ6rFaiI4

XhI+/ZJpo4dUJlmY94qPbeP3nKmCaLmKGTujVtjSmeWJ0a2b/8IO7DfOIW5lm8zxURhI/Hr+bpRfep2g

DBLRmDUVJt52hyuV0M1W/2j2HmLQbfMJ2hxqtXZq4W
BdU7RmhC4/DChLip7UHm9lHXlAZq8xK7yR6DuOZM2u8Q+lX+1baizRZXmfS8Xwmts0AHKuwPuNFga2u8

w2LDLc7p+fDvb+0PbEy8jJYiaeXoqEZ/wUC8N9sEPZ7GeOhqJMUHEbEbWx1EH2fyKLBe2xnR497BYf/p

FYBzPSEzYFhFcqe2nQAGkUALb8gcgO6DD6tkBnwVZY
MBksoO98gnLsU4aY2SpaAhb1EzUeIO/2kadYViGpWNS05YOIcrkEksutmn/CJnugMLKss/NZ2uppRLuo

PBeyW1WXAx0wuPZiZjua5lVQ2grcjhf6DmhZJ6wTisOFl4Ua0YG8MPutMEGjO7TFdXwnz96s77+4U9T5

3s//7wfoLpev35YneW/4bXmCyM50+VFHnxLsd5CyS2
zmnupIzWzIWpuUPk5DxQm358wYEJ9lsArv8SfQZc6CcMVoCspCtEbuy7ubImWs+e8ycPQxoCuy3W0KXB

gx+K3E65vb5fVebRLcZJdGwlkNQKe0Ez7QsNsHsQeBhM8BhcX4Rj4Z1e8s/tjw7PWNZl+nnoQhg3q+Vs

M3ujwB1HevblkeQXlF699GT6h3cT32sTx3zOjW1pjs
AGtN3DNKAMAfk9InsdOK6XxMPEjxz8RbQiJHF9oLwZbVYcBhnWsrUI0z4Yu1cOqcyStNRyds4z9sY0aP

PY98BDj/0413r5xObM2UdzZWj2uf8lnFmXHCRbRa+rphQ4ZDisDLQCaHhQH4jwwQRRgnB+Udd0n7SZzc

8w0voI1352F7x07UsY3H0fxvISosPOQMBmpccrZuKq
UyLb6/Do0cZ3sT+IiISYj+d4zT4TwwxZVCZ9Gg2oZEiMRmzYLe9xN4B5SVrfhVJbEV+3zpVsXxEaxoa7

R3wt+Wf5c7wKk1cy9otdo5alm8K399G+1DUA2ykBdrBJjUgi2eR5ft78GoS7KqRHVlzvuldnS7L5QBAQ

YD+9FjfO/JrIUqppCB4uW9aOv5GGoR4bnrxtxnu6Z0
4yp4jIICS1ECrGqUEYyK/l/wyGDtAtgMYxOe9zMKV/bKuoy0JxVc/EmjRtAAHt+OS9phRqq4Mo6yQnzn

mAIi3Mk3X+3mreP3cx+atH6J/tmibhTQUyXcEorl3OOZ9GfytfdaMEFkFq0MQyTDgtx7b+UbQRXKq+7E

kJcFXJuMSSPP117mzqfXaHavSzBuXN/qi8WiON88wI
tK5BIdqvIgCTWD9/V4kndTeeHU2sE1kk8S1t4kWU1Vu3sNnZ0A26rmVzXjRQEOAldPaPhRXoBXFltvPj

PzIviFUVZxy2dyycA+9eGbB3yTok7Blb/KpEr2mLMURKP3nNwO7ynL8Pxsf3yTQ6ckdkvqFubxOjCijw

xWerXFv/mx601ORya0rRQE2w70saFtCMdhZ0U0fDnd
IFkBuAOCNMcKo9cXaEv0Qw0+x4aF+g8GHk8hvhH03pFkugvvnEWVl3wWosW3qFTZIQUs5ZSDIpAA77Vw

4sYAnqUlFzkMMse7LkLno2FtcHpHWlFxdt6u1MtTzG2ZEdShW0lz1DhkiZ9EXpoNzQ47+WRCi7po3d+d

Ng/cKmkue6H/7vW7n8WywIFyhMu/pk0Eygi3/zgsjv
KvEkADFK5B6k0meL4/ZN8yHHA1GDVEbVbH3CnmzQR7iLWCDmMVL+MThtjq0SVr3BqVSG+I7MOU5T90fY

sG1UVCJlz9vbutoH+hivymg/+7TpxEMO9r2B9doTdX2R1mDq5nUaNrCyfIxx0riwugAyaz2FLTixXhdp

fBqtEOW3Cr/lLUdlwbIU+1fhqudXycwtztrKfO31j9
WD+5I5/yuykJX4EBUjsiLVLUQZ9hTJQ0Go0cgXMahxds5gvsEmB0okoyM2aaelcQUob8K/3aJ3L+i/tD

iamb6wI95pVbSytb4pTzjRXDimHBHqlLRTH26mQ2Rv6CPEtd2BDnHyRdDDzRtDyH1q3Bc55D8IiIsf78

AGBR8k0ieHerqtou9cfEFoG/QaO/hjrueYE0Pry+KU
83yGq8eZePxUUTP23YwToodb6cKFNaeiTdNfJsP68oV1kxH06tPASe38T6T8WsmWrfw80IKC6EsC9mPs

+8p/35i8KOdoIhs8Raf1/WegZu+8TiJl2VZjA6PcxWJAc+EBdXGOjptNQhkhHGpbY53uvseEyBuXwcn9

6RZw8MwS03BinY/ouVcxy3yRIMr9Dn2033pgi0wSMj
XZy9jBuwvXVT84arjCS4On3aVLpkRO0YU0nGVtYpnB87MIW2pEru9e9KJ+SGMaAYfNK4wFLJPxktKLe9

lC/QlB+CAq1g6KbKsqk2hy8BnQKQn3x3A6QkggN6ZWqatLGcNVVauYeqKl3WIsCY5Rhbq+sJ/lgqV1H0

FZ2WZ82i+YsYvxnOXkxMZAVQ6FhVfFb5QZG8RpSEPy
Wq0jQ9UYOAa19djYfBgXHo69JOVZOJFby15ducFk4FJm9WggA7KDFNXr8cIzes5529X6dtjvTPbRhAnp

PqsbyyR2ZTvtqjQpHf2ocbIuAvaJFWaY76ok/BeLLkBJNcPk8M0wet6w+1sY+eYYb7/xoiA3jNEPYUUC

5DZ1LqQBYbl3o9Bro76UK3JmMB8UtzEmUZwdqlL+yR
/5z2u4l75tLTRV+Xem9qsx4zdA7bjt9QXxiRvFGdbTMUjtkmUj9e0nh4EFZQoc54xDmzkf9zve24u6YB

qaAhWHP/1eVhdkzYewCpeydJijcPYlF8qwf2IhGMurFSgaO7Jx6X4MgVS9KmGJqpVphAJECdJ6eRtz0g

PIh2dISwpZpjhvnZNiFNSFJALk/i8cS/42BU9hSjgd
5mtOsBBAIi5m+kQYyn14tq4SLsQ6lxiBOXaH9np9l/8VKB17rD/r17JmUHs6xp57wOaC8ELXZaRWZT7X

s2cAezEEscqft5+6Ojy4r6qzcDNeLcoTbhOMjuLvVu6Pd8ub9rxdttwWRysA4q6vtOimpFjz7Gap2mNP

ve3UcBAxmzwC2gwQDx2wWSIQRZzBGiv8oDpc7j1qvt
NIe27jPSEwkvaEkAD0zkUaM+U5xTN/43k5NT0YJVzgPb2e8EyRYq9tMIMZWc+zhEBRXm4omory1sPOAL

4nPEe1pFx8vkCC+fzvENpnMlIAe7Ccz1qA6H68W6nZ7QklNc+RA/mGoRA/nvvb9CrZd5gCSdGbIC0Vga

m+B4H1GpMa7Plpzhj35+LkxmktRvwBu4V8vrD0JFg9
T7PILmZTOF86B4pRbGQ12jV31jnr41WLfmp8Va3B23PLORQAQLL630yFc0YuTwrqjmhHrdsVJZEGgBTF

y3eKocbFMguISU4e4hl5Qb9zxV7I6AIbp9jTvGHc67rTjV7hYZPPI1SfkKdmAq3ZcB75Sf80LLV6jpc8

RQy5UztrJxQg0tSoaYi4e6fw2Zvr+9gN41QwSiJRX/
jKEPhEVmyPbiKE/4sHRqicsTvsWvvaO9SK+iPPX0h4/b6tn91Ct3IMlpA2IaMSFu6h3Q+IQxyuF7pm0Y

7yn66nOV6gY432ZkyfOSvHl/rkYRxFNs0rj2YsnGiqiCLzzExMYs7Ngghg6WFUAlm2kHWrDUXjhp9M+D

/9tlH6oO55eHlylKUVga3k9O4tyEvBKtvqx2NoISzq
UXQp/FanvRtODel6C4shP0AabOd+46N7M/Ko9V1HhWDV2yE8SzQN7lKZfgozspLNoSByUp0HzJi6C6rL

CpoA7hdSLrk3mhhayv8aDDwhpgrp8CGRKZVPQ7zhdRxJPI1sPQyN2vShNxf6DyxgJZ2bkn2cgM9B8NaN

WY2iyoPEv7LhEsHDDiWT/XhH8iTdY1lfV8PFXzKpby
/ApSPr/AewLWhj5qm8yxzXlG2bgcitgJCrtdVPeMPEGVYBFAhCdfh8tqqcTMY9RTpR/9VdyWacykVcRT

xHyas52XIFfP3wEsFl7hbBJL70ww/hAE9FfuJPBYJMw9N4t82rrtgvZ/CzR7YLU/jMFf7SCOXMLs4TB1

8SVuDw40liRQdpq+4EL7ASTA/F9b7gfy27OOLCndOq
HBnIEmMsiTIwWHmeW/EkJNI09/UeyoK5i5N31e4Ri04jkPqOKtFkB+Q9gAF4Vmq+abP+ijFMFhkxm/u0

GjZY2d2UxXzbcxV9WMIV23mrDo1zC6Zp1UCXZjs6uLW+qjOhhiWuqh+k2bkP2BCogv7hT7NFy+XbuSVf

AGoHyyTQEUW+T4k9tfOT3nLN9eR6oGL+5cviK7y1rJ
e/puUqNh4Sace6MW+gYJzlV6qRVHu7trPkr/Vsu0pLWb79DF6bLIjSeoADMP571qPS/SWvjQJpZt+Gs8

A0tldJc0mWUJ93M4xWoUSwwJKe7WKBkK9u2e041ltYQhaKc6HWU7GDNUS+DDcOnjJTmhuz9alwxjg/gf

9s0nSKGeicnQEnOVFk6/6CVdUltb99SkQseT3Ac5aE
w83M83AmUs5+w3M4xfoyfi6b2HL7z++6ZqOlbObhRi2L9DDjTYpV58LugrUMhqU+XNNg3E46DCEz2p0y

E434t8PMLltqXcJB++w6VYGKDU+rU10QlSHiXIL10HidFlTjDPktGtJejfavC//ULPqfo3V/cvbwCax1

cIPoNgvRexzu5dPT+jPx5drHWocLR9jVGW7OQjfmj3
6OH7Um+akWGFH61c48UbJ5IJ750QCyZvhgy+GG2FSAq6/J7/DoetM1cmLJUYQ0wdsjQ59kHo5G4BcAgL

9eRgO2RQkfnWHMFXIYoP9wrY925OLfS/dEAOVoBlsu1RPlilIgwDxOYbj0kjPShFUVuYwYPOAIyT7oXU

IEgtYxj9fis15lyCRjt+Y9+Hnqa9fYc5vKWN+2nILz
OIxpnyFRO7Oka2L3PkgkuRsXldzt4DKwJ+ptswZ69El+/Z2GBk/fCLbJRweWK19ZF6crwMOQ6Z8b1VHv

H67RKf8nDHEXgxihN4oOniA/mye+DKCTXntx1pbnl1OXkPgxekp/wgu6lDaNfX/z6qr/sF4kQP7f2qqt

8wYYNl198z3Psysust2lUA/3kbaLyf8HHrYckX/AwO
GtO/2E9Nhvo0VLpngQA96cxB75nC8A/5sUEYPXBhy6YPrter/TivzlxcQ8wj7CMs8Z4XWeidUMycYu8A

iYZvTkuc0z1bDz8s4sCGUjeTWz2i3GSRe4oz60al5LmIK3RDaVfzseeE64QBzVb/yYtfYCmafbRUpuQx

RqJLIIg4ps6YP6C2bn+emkmKN0+27hAd2GJ2+rpY7y
P3+n1wD67MnYVIWMDPQ2q/p+7b7HDPvkPAgKlntrPMgiu58CTy9HnTWz8ahlE9kONSrde+2QoUjdb3Er

Vsj/ltH7ry1yCz4h/5ojRG8/NdHAII/sH8DKsvBd0PkN7R8dKGHGxS6im/GNx74SpKTVgKBMcnGdHc2z

36rwC4gnLLuf3zkPOpUTCe+pUtNbqz5tZDFEjON7Ww
bTcvc9ckrcvWUCXFiY8G9AoDEjF1cuU/cqdbc4hHXYTdKvUkRqzj73KpJ9OTYQJDie+HVf4yUM2hT+hm

c9BbOFGYXfLZkGfKG08oYh/uINfe5AIANfQ88o+W303vaL9WpoXLZ7awYUgRqaInGKD8yBsWsyrk68T6

fA4tGyex4ayWvHvh7xuSeG4d3h2lqEpcPxETRnoa/y
fPvEI3iIDhI2HGyGKvpnnNBHxTIbtnOEHX+/UsN52HU0BEtDGBpmQl7Sw9prvp4RolSs47u2WAcEfjxc

JOCJHqhsa8OLtS4q4feJOub4uoFZo7hsTyq+KTp5LKy9j7O3a0MTwE0hocd5GOya+5K4jmGImddiGWK/

VOQswZD614QYgNAIx8QNBC7IaIlzzu7wZpsxoEC1Fq
22CRnbhb6i1LvOGTfpx+/Z0SfyQS1ZK7Kz/2S5Vp/dXKnM/TA8MX8/rRuqbxgHuX8B68O5rPeikOzB25

UjWIyhknKMt3mUd4n6cxcyZJ6uPTIVt3mFh+Oj+kOeL+o/LXDuQ2mWSJecWMSLR7ZuHM+T9Sn/dHxgRS

M69mV2qBE6UCg52UMEk3iy52EceHenN9YtmWr+mR3S
32seP8gpZM4t3TtNTUTl1YMfSNlAxV8ypfXeBEg8DrtBYbcYbfn14qOT+yylwIX9lDuHQK0MNqlMkDRw

+L42FfiLgFDYf0/zXeVBCKwrOVlFqufA7qIPjbPvEyuxCSgw/jxm/6r58dJAW4jemuyS82U0B3aFbMIu

WaayILF+HCiSxhH7UJtsUL/MyNVHlb+kJ2MYsT6NJ7
cEu3cNQ1kbQXmQmG2WuAXj1yGqTOjR4WCP+vU5d0z49anTrbdP68ObbHlZ2W1UmRqNMedilGxPLqZxr1

nlJK6ZiaF9s/SiYicHDb5tRWblGXRto/oVYG+PqvTtu6UN7XkArnGZF8Op0rsI91o8PRudJEEbbIzl+T

PaapOPvHOLMEfJ3Le6HGS0Ou6xsCd6MYJH/vpIQZDp
YW88fVtWTGUG79gmAh6kq9moU9f8eHEo3yeZSecblPsks7nYInhRIcOooAq/aH05lUZRggW/nbBP0/TW

khE5SICJV4IiTA9wcUiKD6Td+s/tiKgGj8aJZbCVXVqzLoa8APiuV+o64pIiQVxnvs/YnQSrSkpFZCgI

r9ukaISIlAaPyP+InHVzrOb7KpsMF4lyyETbhm3B2T
rpYeudJAAjmyETZ7E0/O4j7nQHyyl+DnwHdWtYvm9WMpt0jXNIEgiEQO6zdB7xYg/E0mRSIEOvvz9W82

8yqb3s61F2GyCekuD7ghBaXzpKzqjCfp0BzzGOVuD/fPvmNzJjSdK2asIWd9KoSRsL1TOI8B+6UboD/J

+Ek6ZPBEhYVGnnf4Ucq7QcoIWD56d8hdeBm378IZhF
jYZBpF25r+9E7eKmXtBos5JsPeL2LKrSQQjs27SS957AloIodFPl3UXezg+wmEfFvgExMisMUB7TV0X8

o4O2mCfQsPnSJjEKv703u//E6cDZ9KK+lRcz48s73Y0a72+HuBEpoi9zzA/ay1lLP3U0kGIDVDwrH+fZ

9ZMcSvtV42yu2DApxaUIfyB4HJtHMg3Y5fCfM3PySa
JGrL8GDDr4flcJAylrE+TJ8VeJ7vZKLQaHcrmyeFADJ4RngOEfVR6FWVRuP72s9bwsLlh1e1KMVlJgjv

4mN0ndKWCdDSSBAAkuaFMS4Cf07t8lg7dkEkAGxqQhRldqmn1cm6CrtQwW+bbx5qMS65bkOaW8Rp16RK

fmFn0NJezkGcBX3meHjJQAks3ySr8KJowB5MgKxeDd
SylHqb2x+E6Ru+0Wg3a2fuM9rFboGMh182lAZZETXX4ACTtx0rEh8uihffZvmmec6TJVyMiWbYDOkaJW

vaPx41d0XcYgNaXmTqFLqWlZY1xc2YUKB8QTWcEO1WXcK3T8ZDAg3rcOn7a0uuQeFiELdibuJAWg9FmL

WSda+pov2Uef3gsQCEpFIFqoS8u+GtOw+AGrvxL2Gj
aWX6LQHGPaeuUr9BIyqrYpNXkwgIfO65esIc09SWUM0kDq2tgLpxPxUu6580u8GhQIWryRpp+WNfQ/pl

FdN0CuAEHhuCx8241uHGgrciDSui8iJudLUoXtJxqUpGXZ2m3l4QzXX6fmgDN8shm44I5ILdjg52hgZ6

9jVLo32D3bEd2DLP7XZE/49CF6XrtB5w16BOLZxu2r
W9BZ0LeGvWXXUiIdI6HBZDbMAIh4L3SsDmsE7dxPa6S/7m+Freeman/wWK0KApxDSpxL8a52BYt6bK9u2WSo

fmVRpK/a648Hi5lmgTp9Jz7GRzHox7AitSPmnJO49kNpczl+4A4g5thC8Mnuw4am91nNUZij0SyYBZFA

A79WJP1FeWpj02GTjP+/EJPF0YDmbae+mG6g30/XM8
fmwUT7oMs/1WgLp3BkEdAckryrJen4mcljzuk9x+ecyXaXm2YnF6zrJUIsGYu+J6zJ0PWOWtzc0lX+Xr

PMATIoKrFGSfNR1A8oQ+c49m3abHDvSwiRquAYJvEAn/wGxN+9WAmuymvaOHVUeC+T966mvo8+aOgFWb

eZIxIQdJEArTIsAuKBerQe2DHQDEoGWYCzjO3DWfAD
N683J/XW2P4xnp1VvV+mnBp+U5YQDuiMDvnGcZQ8dN64doL+gPZzLC72Qksm3H4nH1fZZ9Dt2+QKnK6d

EcSxsOY2qcD4Tb2+mp8DPuDuF1rdvdfvLLtJz6B5ikweo1plrWkK/9IswthG4WBhlORzqLG5IV2RVabU

Yz5A3YaDou5nLRYKKlYVAWthWwqV2j56wJRQoUjS4O
7x4VVYzjtf1112MrOi1yjHoeGcHXsjPDlFcldE50qDFOEHXM7+PEFkverxizqLRrhjN2mIWvFkwfa908

bi372Y7YxvjVHl/JEv8SmJ8ilLJGEBlBEyAqd+O1M5QgMmT1qklJMt3qMdC/vutO8BLxA00yN4eEl8oA

KFfiCkMf80aTbrs5MdYfefVooMJsKQmBVFb/kkTzVA
l/IwYab5e9xyGjBR9iLzOsJzcsTc4cy5/T/bT5vNCjdn/wdn5X5ajcmvvj3JS7Jaznws8SPcaXtqaMu3

FzJQmuvL7Vf9E8/jySIvZ6uQRCmltTZ3Spr0neRQd2iDGj9JI3s9QGx0p6qctlhc4/cnCfwrIAA7Q4Lq

zEJoXx66ji13rFeIFOg8TjY3kiGHxthxtroxNm59UV
g49N/ZXQyW74lk5T72ENQ/5NQ2b4KBm3B0LMK/dv6giGu+A859VmXy/UT/b/QAlrZhpvTiBFJ1eGH8E/

3eDenDpemgYUsv7hq7dr/mQXbJmFZquG9kH1p7hClAdRViyT1ig92EkMqY4MgtyzsaBdObKkbpK1/Hdh

Qa8lj/b85mOw7JENLimuR/MFfBsmuNYQrvuKj0o8oC
FNddVSGHLZD6YTyZoYbCxWbxrodspOIIHKdFHTSDytGw/w6UhtYoxK/47z7bzshM1YX4v5RoGvj1m20L

1tYD0JUbBtDg/UaQdZwMeSaKWkkk5p/N4Y3QAL3NvFY2ZyK+RYRnJpsZUFZG8efoDJm/hBWC+KEmC/Ok

NAf4E19T8qnoble+Bkg0Ln0F1xoeE4+iWm5ydzwjMc
tHkubo7IQ5vRrPSwULTBmFj7OgQzzZIADWeyv7aGpKntScuYwkgrP4zQrPZVotk1aPdXTApQrX+o5wKZ

So7JUV5ZWveRlZ80g4soQoFe5wL0miablVFlN/I6FJ5F1076zNG/TkODifllE7gmD0ai4pWaiddPpRJ1

3UtdqDzkNaFeWN5pK/U8aQVlJY91Jjd0DeYzgYjdzX
P3bm2JyMLb4ZGAlO6IpC8gNDdhJ1HERyo+DivjpWKUUzTXpCCPevPxS4+4nMRdfEaKxL/2mJYewLeTQs

qDSql7bOjX05l/ID0ZTHq8FnACzfF31iv484KaS04i7bETHwJuyjrkQ4z0T5dvAjVvsKRv7r9taNpk43

PP1yudMF+g1/kiCIkqb2kGxGTNNTP8PSJW2NlxcZk6
ZrF0UUo5aMw3e80TYc/Eu7q87udgDWnOKzkJt4hcQc2B6+70ab10C7BrD89US7yGuLQ0Z6z/2v0UIkEX

ZtADBDYWvC385zd2N5P0lM/ur1EQ9CTV7m2aRr7LiEkCEEXbzHElB7KILYOzbFEAWtJsyMSBd1kwPx5T

ZZiXwr0pbuVLY2QDlBNOo/4EGFFE0XCO4A6ZXAdt0j
uLG7fri9o/5oRK5R4xRYu1S/AUE+sg1DvnXB/zicu4grzJ77SnHUHEadFkYO6Nmx8RwchPzi56U2BWM6

L9C7PU8d4l4VstZJvwjSkMQCmdnSQPIfD9j29lDkzvUvtg7ENPOF8C2IxGaZTAOi0tacU3633vo+WLgD

qHFr8um6gXHCkC0naKjqZ4gkODh3CXatO+RBOTrDV5
q2DtRRTQSTB/ZuplKxfQn3IxlhlCg/GzU7wkL+zttm6/qakSd2iWXuMD8f+5YKogsizWbpkUVtOLkzCX

rfkvQTqhoXsE7xt4bPi34Tyl8hKxhfgcwcXScgWnQMRhN58368KCSJw7gNJFoGfx34O4m4Xm3pFlZTaT

9oLf3PhNEFCpRWOMwgUiBzKFSZYGGFIjsnD7NRI1Ca
P8RLvp31xt2u/bzxoQlgbRAFE5JlWFRLzTzc9orm4/Kn0jPkAFBSvzUkckJBSmuVOTFsntBCdNCGyFTJ

l53fxxQHbldmRsHuh1fKaChwUa2Vs1g2nlteSPvk0uGn8ZL7Se/6dwm6f54DnTctxBSW/ODbiPxsmnXd

wbbWPAQ7Q2U/OTV60ngNfdPaGO7EHos/fni9kCpaU7
CVP88Cniz/sFOBLLalWV4DU5UG1xHmfwvSdywfw4vLljVtDtT8NzZrDoISVsCRonqhTANR4ULqdv9mb0

qCBDL/nFNUeppfCAQ+GWXowq+XiSVezKJfjKIh/RtTtO7ibU/8mVv0QffphaQYTLwVrtG1EuQb+vVNoV

XL9+k8j+I4+uPK+ubZsgqVUF4k7oOTtUSojKy83HLQ
nrbb2f7tw/aQui0sYtMPPfF93lBB2H62VaDXkBZKp63vCfGdAqV5p5fAzou7dgruhpC+75cd+ncYCVaN

A571qUeSrfZwrNoRrDIb8i1m7/YrWSEShVfEj5/969OmJR5WrFtkFSkMwv1bO+BNK0DBp4sRSKf/K515

pJ9LkJwt7wZ5aagRSlD5mAwNenncmKUde9KbnS6vGm
CRWL7EwUF6OuVlXysCGKqpeRUZ/H8SGczsXE5+KI7vCWHSfSsTN3He/sXUOqNz42lct3vkwGfBViiJU4

qV3fDc3TdRn1SzZNUO4mP+YJQVX9QLDiXL+D69tIS6I7qBIOEoBVNa+44yxy0dWjG92xwErjZJsTTmQP

1h6ATZzIYp5lNDp9gRN71WU685LZg4TuyYd0pOk1Ei
9c9cpq5WUldITGqZi/6BZRWArzgzfXDpKRLbc1KceVbidCBxm7QOG1dfDp/9My2M1pqjIMPpdSe25f5j

t/a9m6qzQ/PLh97KZnQXtjftceWrqkSuRHRMUb3ilLtgi9JuYYNkuwqiTCGMhC1pL1jLKa0Z8YD7fFxJ

u53kVsg922CZWSzI4sfy06LDJZBRYwik10cBqyiLf6
Yx6bAeTpcXEnpdQaIZJnm/1hgf6l7XK12o9vtNuHzq9Kns/OREJ9V1wrjFZtVk8LueRdn1MoZn7z6vtu

eaI4MlV82+O6DYwQq/o9zeye5A5PzQZKQ1uP88K4aKkOUZ+HuNrEZBe9tY05+qmZyywmi1TmVDfd4avs

EKzYlhyZ4+PKHpqo0PZp+WcQd9fBmqbJvVDizn7iSO
P/Y47ErWYYLa2slMY6gG2CUfYTV6x02nVcByQ2OQ1a7JuPS4icPDjawmqCNiZo//SlNaaToMLPIa2PtK

x3e0XHFIoRF3EtQmpM/MzttusxizOMDHZoLrv6qyLeRUuYmc+pv/od0CKvJRUCaTaiwT3yG4YImJYfi3

4JtsYbDD2cfTQci2KOti63ec62re4BuzVa875pFeB8
WDft/RjWU5LSGIW+My6X53cv002B/kWyolz+cIFSuPNK8/tiLE9P6Mpf4kKzDXvKm1kH4ho2e0kmRu6a

wH/2O2JReayptMYI3Yfr19SJYUycKdcIgYwzSvWtFlyU1LkFQKdJ9Qq/QwXrOckfgL5B4lVLrIplyiYW

9iprObVRi8rKvzaEiIYym7h4QwTxLDbs1DkVjw65Ds
n1AKdSs2I/jy1RZL/FxNxkOu8Zs3POwgDAFD0O+vTEodHaenp+xw8Xm9QvqWjbXO2fcUr8v8/aLL6o7W

LNWyh3SGJVuTyWV/jOY1EueUJLezeqvoBWDEa80chh3UiKLqkMZ+L/rx9f4zlr1hh8K6T8hi/bmoIvIF

A9wYpoGvD/zEOVaQ5tyWUXiVxL1/zgNO7icCzkdi7E
SJzV5yQ87QHZ4Q8Vip3pmaLeaUaERqpG5psjI4DbrVI7b8mFV1/8BZCidjionLJCMU1nvR+PkYmWDVjk

MbwIegiXZ/6mfB6pI6kMkMvgbYDRV5hm8kb+NxSrWHYZrqw+0RGWA6k1xdKNEdq/6JspsGP9DGwXNcg+

NjM4i14Cc93fDfGLEFpf9SkgofJpTj85bPTxpwSqqH
/tr0SRzgIHVLB3i237Z709YKbhuzBEHgohAYhExvT3f+iviI6WwKOSsiu+sRhuG1wo/fX9ZnBZvfMWse

lAhI18W98Px3bWGtSJU936HQRoR3BnBLaUosQqYLQLzwO/drL7vU0dSyznCHY9KrJE+TWzrq8S/Pr5ud

x8jC4HE5WKFX2DjekY6cijr80kbxU9ityU7qw6C4av
C+3l0OGNe1OOuqD5zEJqxnZmZTFIEtrW5lDv3dqlFPzX7oCEp01TmqscvCD31sRJI5R9AGARgBagkh+o

MAuhJDXMRdull8dFJ5euvdCdO7sVRfddLAWgZ6tQ6IpEUnG+3rxara9pDAlXmY67KUAS/ihYJeMdXzvd

pqa9uVbdeg7CRp91DiKsmOadU0oXDryuGRHtx+es4L
JeFjXC7ER5FdMk7dhFPmE64CFLwg1/Wl4ejcWk6zX0+45Kw4Wm9nljltlbGLGnW5D2qNd9dbs4g5WHoJ

RcVfG7r3SqS2pAs7FmhzkzWDQ1sTVKxZ7GNshp7fH2vgneZ7BfAU7997KLw8oe49Ym1BjiuW+mogrFmh

mPocJMifoPHyErg2ST3SLEZsO1rEeE+PkS/bT+5rPH
yxbqkPsMGUgtWK+ZNZQ4Bkji4M+leRwtcb05D6RsOcHKuEZ5XEgo7k30wMt+HzrnWQw4wYC6dAyPgnxy

usE5CmTbkb2KxLJfjrXQy77JeAozzq4wfU4G3tAM1ZQl/w2h8CxtZythuTBWVayO1Hq4zIQ/m4wHQFJF

nFQi1bM21zQ0oLsgpGWsAE7yRXSVc6BVBdB6Qt84ZV
y3GbPYo/zAQIN4y2fQsqjxRGQN2gg254g92uxdIqvTEvF5MdjmeWtwxh+yyom5ULXXYFtpxaZDLVNLRI

fnNcKr0l5ZKZ74cTivJ2XthY0HdLuDx4Pa8OEHaO7LDSnWjKtynwPr8rL2CqLQ5w9ePODAtVBQSzQk8V

w4cT2/LJ4b7EuRRqOXM5POk+0j1IyJUAamHVky9tHP
W8MJsoYuDljyirBoYE3uzvlmb/OOx/1Y0Qau2vG/+N84Rpm5D507wO3YSWyEEjt6mHFyjd07hDlVguj4

nu3eFsS27UEddrEooow5JaXbRz3YUgSdpK+IlpYA0yMZX611H8C2f7Q/2tdQ/Ts/Kwb8duK/Mbjm/p2p

g6Bj62NHGbjkivA15NW64/bJIj3Qg/bGxuyyruarBw
djty+wVNGDwoAjMsGJYd+ahH7cxTddigLsD+XFP74ws6QBrwC/8RofnYEL6dh4MHQ9/0HYcGKcZrL92Z

HcndtvUv73Y12fXcZ/Lz1u00vmfAL9SqFKYgqHy2Gf6T9XsTSKRDV1GGeJJZw0h6xfYeAp3C1ZK0NYlU

D3xCnwxIH7/G421vDX9UGvIGj8UHLm1U4nXH2sz53t
YZuHDJZDb5yqQA13BtmHtSBHWopuRl/AsR9vNNztgS5NduFsfuZ2h5kgPatfpnQac6wars0G22r18nLY

0Uvj8HHAGOUv3JgnNAIUTkLBSsyyQe9zZ4o5HmHxVVeyyfuSJTNynzlOO2qQtfHZomM0AQG5CNSAPz4i

Kwim8FAhLKIQ6bwsLb6KcC6ELaiu2qMeoq4AbM70Bn
x2eSOShgpZJ9JzWaiS0cVZfkvNdLpmkC2rDItW3pvloWGcRmHRHSyflscc1aLz5st+1W+QSBPdHkzEzI

kGkzpz2zZGP43k3zULUjbnSYS4hRnhRrSjY8KJuNZT0W4ffUaXvUhPvvmCswUzypLOExflL2Ayszwhix

yqUswwsgYAcmruSBATT3Tjc3w0659hzgYmMk1I16Y0
bZqHEYpTAf0VVfHYFCk2jHedoz5cLTtFP5z1Mfvz/XxlqRCqJKoa4LnW3tlzz+yZ/Ax3JIIaWRkKxvtu

BSZkHVsM5j/v9aoYpQmU+o+9vyx5ZPA0ZAAfaQZLwiJAD+7BkCOHa2BXfLZvTG0aa/f9237uBWcFR6kr

lTF6SPwEHdr8SpF24IWVxjU5eEXXHsqrrkNjgerLtQ
XpXhfj20fFc5BsT/u72gD5tQPuVYJ6Yc2TIqzpcebDFpAgeTa5157QO+e/mHzx2BzFu6PljHo8aSUTnB

T4lGJoKEacjUIVLW19y2EmLZTuiiNE18FzdJenuXlIoYy/6WaCqaf0xpJ2Szwu9p08TyXHoroRZ1Cqgg

oBGQg9Kk+Qyh5dudBF5/YdJqdEOgtSCcRWnhqKVVV0
uEfNXLuJyk1JUTlRl83WyxvFH8OqjPi1Tk1VF+YTQMpF75w0e/ftalmQUNWur4CstoBjiZU1NI2KnInE

PklK+poQbSgcyI+YzCPtwVFY/CVK5sQSLrtfVP8eVqdwCGFJCCfXQklt4Y+pJDfoCiBgqbK4DS1+wlsx

viMRLAADs9MSWAtOofkUH4LOxkDWMO4QDI2h+JVIw7
Wxs80/wFR/BIPIamnkCJRaSK9saC4iBTXoRkClQn2x45f0xO6SWq3KSpjCT2C08RSV7zyNviWUP1sAmv

hB24fmZ/2fKDiuDR791bAtSIdC/Q7Gb+KVqWOHsKsBq7gqkX5i4aNqzDsJE/YOY3R24g8D7oLPRxUJ/k

Dx9bsFuIX6+5aKTsRroYkc4nWmcyRTw4e7Nn/Y3Bd4
DYSPQiqcoRC0GYZ+40+wtKI1YEn460s9nr1lzCzZD/+VTe5Ht56WHQL+tXBrVPWLCDeyyTkCm//8P7se

cw3/ua3pBiwif+SpwMmTa5ehCFN6QUMuKPbq7n9LFy7vcgD7PdjlIaSL6JIEvg/kYu7XB8cskAT0ykMI

PysF/KMuV7LvjtWjnuomRfUM8qL0H1xPxuyb9c0BCk
mp1sIgEaww563tTnTxXhN/w1EqQwb+7TXVWbtUg8/BpJ+zr8J1j3+UjsLlZ3hiYcZji9tvM9kVvyIpAm

ToB5MDXJiEaWd/iIqlCjZ4stRLbOwW31Yf+hHOEhPGsJQhvAKNMgkCjv9DmSxuyapJMFRWRxcHHxQrvb

IVxbOj1G05eUVddZEG+R3377Mnf3SqMm/pDln9wC6Z
a7SgLSn3lzAhtnWB/i+q6OUtTT/guzdQwvHAJg1X87eIJqcr8WAB8OfVGAMNd/FEMuGZvqTNq1zqbRLJ

i8qPg3xnQDOLcofk1rgmtszEuYQntqPOhEBjuuo8izzUVyLSZEULh2d31hf4dhHi9aez+n4YbqfA1KL7

p2YgVP9sUaO9JmRf3Er72mfja8JodEmlxW2/vv34jb
kQ/J12FJaMIJago+YMjUDEn3tXmVurcbU2WDeh5JFjj79idTixypWWJRVVytdy7X8wVpgyzGfEueQfNu

o7HYHWnbxPDIlITVWdzqUEJT2nBt7iHwHCR1Nk86dsphsQm1vUyC4BlVS/5f3gFX/zpW9OMxAfAh27h3

/20J7wN3k7fgNvEFh5cxtqs6xWwIm3CwLffCf4NcS3
U4CZE1bwAJlAsGu5GEC+tIkuFsz3Oy0OOytJM3Tom8rf7Yff4jqhHuEySfoe38Ew1LCk37C0Uv0aKYHi

pQZz1VT31jMOOq99qI7fqGC0Ywewa6HORC36fkvy4p4eP0YjNCgu1H5I16gwIPAG6WotMFzYGx9704Kd

1ypkuO9BcCa3vZ2p3B7wzgJ6dQN9/1SDY8iTSKZYs2
E0OIYo0VE9YT20PnxHQArCGJT4KpYkQGC5jpGC+j5rJAhW9F2p/OfHAQIWSpFSEGR1ih76zRLP1udvVb

X0ogA8bn92ozjQxw7k1tSioic+noyy3sUw5firdGFM8ybhwGpLj18NJc53uS3zC2+GtDEOeE0m9AGUui

rc1naChsBWwU7sTfD/g97gdjLDJLTp3c6cEN6vtq2W
A+1ZZqHIiOiGYNDBetdtd0dL3/xOnyztFezN44E4gn0JXn/M9I1ruimVC1KEy67qVdGKH5FuK9ujDRkG

xgYlGiZdE8k40aYmifaGERrfx8Azeo/TBLKx4Z+gyNW1C2ugVf/VbsD4yTumQo7Z2xQI0fr8R4GwWH0E

rWBv8v31T6FUEk9Qw0wx+U3bRKhXegjd/OSzsWgUhg
GGpesHJu1zHNVTYDA4RmD8plSuVl3GIC6QRZAGYOPFUcprpXPZql05bPlxicn77nB23hVEMju0C62XJq

xYfwWFvvAAz+8hFDiCXheY10xvJ9kS32kbOAXI6z21XCLobrk4Uyf8SswCeA+foDBRXJgsI8QGcvlg4G

tr5G+VPf++TbYYaomSsrxPJOFe56WbzKWNsARWOQ3E
YcLZpXR+aAKRToC22rMeROKyNM1inxiFSDBNXhWP6wc5wrG1GYx8hdKqjeSQDCU1poIA0OzuiGq/aqOh

LVh73fMbR/GJJoTDOkp0XiSgGew1pnurE34YJwqBz2E4Eycs26rlXYyVaQrdHzMCicnW4l51bh7IBvUN

fVXE2RfHUS80EwwzYUX0z32VxQ3HXmKGNN1+3yRSjI
yr8FN43BA+saFv/VMBH2SK+pDIj3rp0MK+zfxq+f6H0v3W/8nVIe32locXF2GlZEA4Li746uDQ6L0fgv

/tCgPy4T73MrMTORgTHS1F3ATuuHU5tEGphyGrNbRt+9XhJvA7BMsEIYjNHShr7UeoDUe6NygrKU1pcz

Ra6yYgBaOTKHgbcMHm21n6LZY7NC6vdbAtUeQ0jKXn
cyN7/jnTz9vLRq027hZw65D//xFU1U3JaXvfooHjlSFILo9bN+eStSQao1l6DXHoLznLCXkVlPr5FU2c

e3lUa2pvahL8adjOPAIBpuQtuCbKC+u0DzWilBoehpnQpbq0p5m5mmxT+OMiu/7v3/QIJc6Lhif3dh7V

jH17meNeuQ7Dqpj+zzvqoASQy9Hv08BsmIYX/Kgba5
hv0HlLKLDUSXX5q+Oj4e1haAEqBr3qeFeaZ2wjzuQD+Q/3RBUwvlaUheQFjOWKlQPxjrcACEERWTuZy9

uDfN2WUVP/7gZztriTz2BlB1w4lVXryO/oMFQpXbJOPia5GMABda91K6cWv5aPRbhvp0gnYKaGTIjyL7

2uHkXPYK4V7dXn+jpa9Y3t+8YNHZIP6CfSjpY0FMSL
8/ObRVR9KGIz7Z1Mtf8g/zyOljqGPJ5mJQYNYa8rgYSzL8XwAtdgp2OD2SE9drb1dppNjmNZrc7Tz+wD

RXKc/5bGIVpBqOHQFoxIV7xYc59d9w4Du2lz77cFDafwSE/UVty7FzzygEzuPyrcLYevCCv3/h+kIvPy

QqnU2n2u6UCOrFZ2zhm9qbqVQCz7lu7qXOqeGJc1UZ
DNYTRyAJ1MSV+S9AYiSQkuc/s1SAKlFGaqI2w15j/R52RbPUgjOPlP55hxgytusyBchLvMxFK4iSVho5

Lij4+RIBmFfg1OAZtZcTKBRTpQSSjPSO37cMzLTnk/XYFhMiJUdIhDEz3yhvfebhnfjN/xxDB8000xF1

zHuv9RTv/0psIypwt33k3nqo7GahVGrBWJwW1wDBmZ
Dcs5DELHt3K2BkgUL6cvhzGKeu/BuVUwIXZWpiZIGGzKyiBTG13yrYVREjcDf7N3BD86//oe9OcDYzBN

oRErJ+WMaxwcipT+dpyCloP1FGtea+pCaH+tseGjEvUhj2VApEeowSVEU6Pp6nujZjDa/IcaUwf25prd

oigNeLA8CMOfs+s9kr0X1jUyA+9gNE1p6yjGou2Ve1
jb4pMMq9ngAiLWdCjC+qB86L6P6zyqvwBz1tLpdjlwCQISDMjE9GtAjKSbOUZvDNbVns2k//8RLbZcmt

uPi4GWDavreygNCV+l6bBiRblY7W8/Ts8u1B0TPROKct18NK1QdnmJCRt/4OcM1zCwhkg404P6vBq5eZ

E5c7ALQUOd6ARRbmEk3YNv7GOTGYiS4zvy9WYYj3Jb
BYTbpv9qmZwr2c2RGzRaJFIij6Em4jVDmz7z5kkL0n8Wl96w769sWSwybKLqCh3a28PiMbUkx5sx/i5z

v63lIGvPlRIzTzRAcz6q3lHs1oFwOF3L4DJm1U5nc9UOi8OUrwFkiKK0a8wyjttPpxe/ulyjjszen4O/

zak6mg47Z7TnKTDPinP9D1c5iXaZn/Uax1PbC5eB1s
jfu/8vjNf/HmDJq2TABQg6QmdleJe88GLGV1dCDpV2eESTRRI8bh4wm5VMvSOoJpoQRFA1BZQd5qpAFc

1JPQFeqfHdhiiF0OLf7LPhdG2I58n8zTGUmpboiFoIXv4No2BxXSSV7MyupnsHm/Oc5j6kQniYaZ0juE

lD9IyohfuHuX3DH/SVZwX/hFr0KkTPbjBRvrDZpdOP
NptaUOCWtZpAxDZR4MGvNIen3OgxDZUqddbRlZp4oqKxH9cKNVOxQ5w23VVof8QDKfOUNnC6wxLgY6B9

bXrY+qYhkIn6ULp3oQ4LtWQ1vw9T0dPUvuv8i3NSyiKqPyUT8ThXWdAcDsQmfHq2yvp59MdlVZlm4rbw

4uWo3DU/mZCyi7+miRJWJxqAcYaX8ZMCsyIrwQ7ZYg
tHTXRvpL0A8mApb2L82LiI67o9pSdmsuHxMfjOjzaNp21Xt1x4nkg6ah3Dm3XZogDVklpwjaviHiTTL0

AfxOUyG+VaVDRqUy2vbi4xb93lWnAIgN9bV75zX4MdIfgRaIE/M3phBZco4anwEmU2kFyYlPp9dJMo3i

FT6OyRBDkxN95BFmw8l+DdRhwQnhQwxq5DjddcSmEM
+2D3RiQ07+S1o/cWTVq9TifRRVvb95abgXEvV9MVuddyYJU0/ceA+cOb2A43/FhyqfpYrOerjciYI1WG

vY60Rv9/33ITDWvXGYjsVOYnzeekjoAAK9ZbijIcry4uu+VhAFeTbJbSWlxM4514sm8aO/TjAEbwZPZk

BovKhcsVVpAEtT7eZDgmqctmcy1dk0ZkUUvHsIrPZf
GAU6FScKO7udJRFR7f85h4ImX/2UCR2CNzigaP/OrIrb6QG6AUYK4Klno//vaLkGGP7vfuM3Q+b4smfG

d7MOwY5zlhQYx3TFFMBuLlLILNAXtZ2SXoRps5403Q04UszzUnoOqlW2IDC6tve0FwWbHfHdkpEK3QAn

4Le8SQd1Hp/YdW1JEtfg9O4IaZo5e7xr6PnIY5jLq1
d7TyZ1TD03/UKJgBRCxUGXZDY7X21CFMREANnYtb6pPt3ZcT94uIf4JOsica9owym8Ri9tFS3kFx06Nr

fvLKjINJANC5tro5HIRWV9ipP1TyigXnj4Lc6VyACV503FhS6qOcA47pKl2d8ME0eb8yRWi7IJLiwYvF

1U3nDNWVbJMV0UOKaieyT6axNz4MYN/we6vjzLFxo4
MIYFrBX5V2q2tLOqCtIVdUDGjWlqUTLo2pUjvf4yls6eUXP1CGliLMOTFUPiI3w07gk3++7O7J/93swc

LyQf7rVArO3nOAiiY7n/S4fkDaEWPNrsymXk6Lihuswawu9733fnvmxf4rAK0JeSmJotRs+LQA4pdOZL

V39OgFtfROb6qM7EEJwlWiDED+CpeAhLzWnsABHm/j
3HAbd7Dx8+AT9KgaV1bZXntqbaMi/rc3iqMBhPSiHuNyztPliAzNx94qydMSy0k7D2kOy3wHJ0McyZYV

BgAT4HsuHqZnBjX1mp7fNHoGAFTs1E4a9XU/t0sijlnmEsoAkaaDMut8P0LkP7oG98uskr/OUBeksz+b

MdrLt/e4k5I+1rz3WyCWDU8JNnoCeyc7rmix7CBv82
BDMpQeggsb3VmB0gFl4hzbt1p7ttrbqpu8p9QfTFhGFtvkDdugfXKsrA7MkXPe2HZowtwhneoS19PePg

h/BTo8nspGjUJlhnOeDxLblJOvO1TvwvqSgc/inmBqYd8Nlq+AG3I9871OVOyE210A7PdhxYGwFiU4q5

PPkgBQqOdDuhbpIUz3OpQzZoOCfF+/B/KVgyGqAdRM
hNpIKeFq1oOmuZgCw7G5t4iUWVEU2woVzPxQVVB3dYc80AMARqQwgnEt4PQGNBq6OGyBl+GEtCtDIlv5

U3/Xsq/Z8f7/laj9URt8inbv3skmxhoweZCbY2XnlMWB5IE5yiRpQbl0lZ1PpWz6+jXqWw+PgKcFg2/e

GehCXm8iHeuRKDx13fRxBfR+n2/wa89tKheG+i/Wdf
EyTGZgdARPyqabiQXQaQnHP8aaovxLMv/Mx8Ay/SZ4ws4Jb2g3RJH13+0VEZi2v9i79jD56os0MCVE5I

pSr+h/4yZDwDTbxQ4dKnY1xT1nr5FUQaN8M/WVmD563XEiUrMKgDAYRV0RPNNpoYzQ+mwjQXZOEi9HCe

XcGOHDCxvPrvrH+Dq0xjRSAMfh95ZTU2o5c7OGWUUh
THn67CUyiRAWrbngohmCS1891Xt5t7N49SpnPncvF1UWln+K3IA+L2MZMngGdl2f5puwyTIui7g+PGJW

BuK0Qd19ZcAf4nfd4wdTAa/zPEDIcaNGc7yxssBgqaigi4DSBmk2TnQrcvxhsf07YupslXOw/UAkI3rW

qlIJIfuPa3WCDA1HO17VGBkJHfYK3dul67lRZ2tQCe
vU8kks1dVyfnEWxFcGGFPwsSBC15BN5KMWfWqeUH8NIOH6qiI+CiIutXeGeTfy8zbtdJcIuW1QYgYkOK

silffz+cKJpyD6fsM+6yNtjk5ndmXMwJLnSYDd8RuJ7t0IOU0vFe5a7yddbDOpDPNBedvPFwrjMAtE/l

Ul8B3qhuAsl44wp9m8mHGa4YSMMmP+4MXGLNY+jG0A
RmhxuB3VMsi71UuyvBDjLBQ1PAs0P+OxI+VvunpKAyYFamduGVGReEtI4WGib1B599YXsOI2KatfBKr/

vMlfDNnO/Pm8ct7xf8Pm4gGLatQp6VqOz0paefU+/SwGi8yAEkrQfT1MrbZNRfmX9+fl+7d6mF/vUE1K

nfZnHx3q9Uks1iBjgYJk26y2GiQE7CgA/bNFR4huOe
0rvTP6Zqv3TQHTCoyqSPvmd4WXBymK1HyZrqfKZuxA8W6k706xIXlhnZ0iYHwEdUwZU9JfOKRgh04jq8

Gd2IvRPwQt0NdQoxfjxib0A4/q7buxCtFQqwqo4+t9vKhwAMylNm+hu0edkYOo5p6eMfDpYxKADcC5l7

6s4WgqrCuzQVEZLIcZ3ozqqSZJJGksJHV2tvsy7MUZ
+aECA33bLBAz+KCZeVQayNUi+n0ohUMKlcyCEmZkNln46v5G8ZYjPKoQ+Q+FOCAnO50WMjZ2vQhyv35l

qulcN4UG3qKQh1BlR1bRwxeDEjcJUr4ZcQmGOWUUSy9RWCHeDN/9wtVRmlwFmM8FFNgSXa8Z/2VKoPby

vGMmayENMkDXX51sC0L8jtFhc9Kujsu7l6P//Rtgdk
s4Tqu6bE9PP/N0xDZ+DG9+QrMWL7ZyfCxIlpIZaOAxVGnKNFhbglw4WIwNXJIZdxVm8enJP6enO0zKME

P1U2HBuYyKUrMd2SF3eND3a7ivpDHyL10vVCnLp5dnZOcwajS35XnW07jnePHi5PAySDDep/bA3HtICS

XDbs+3YvPsFFdotHDGMGAdh0h8eCMvCgkc+S0zMqvL
SSgT4fSloddCxm235ryhqjjHNxvm09Fjj1fo+YI5++xW1YBUS61R7M/VdJ43/j/7a76N3zCv2dPuEe+6

+hvIKXRwkJ49aC44etAbagZVwt4dbwTI0Hb/dEPEUgqvZQl4svWkYtjPAsz1/3Ej6mmHflGPaU8Xnmc4

8Zb1kKK1+jl9fokPDh2lvf/aHrREmurTHC89tweQyJ
INjVaT4do5r/VxpTbCpQ9maqW6kysDsUmv+4emqSxT9rqNM3DeTzfiKfdo/AQSklik3NvnLZJgA78GLY

COrOuL1Abr8VMt8f95bJ6rcd1Pz/k3hG+a+bD/wmca6Suahk+BjYkgl3TtS6Xz0rXnaeRgDXb8N/Ana Rosa

BqThmbMTmjlwU4qmqv4rI0pIfeTObK1Y/SnLKVYziN
H/X17AjxIXcWSBEKTuITvegVms459F2aRLIIr2kwoku2sdR3XEQQLZpLqxMWYoB8BP9F94p/c5LSpVB/

t84dOxHDzPN0VbQzl3jljJxQnGcga7CPvLU3qykSpLQ7ucZuaV7inWf9ha74MeV4ZECKEp8mCljdGHu9

3KjKWd84o2Ozr0/+lw2G9Y+CLUdJxy14gIblh4nR8L
bACpMCPf48BaRHThAT2Oa7Vo19oDf2nugz+rAuU4Bk9pv6tC0pMtTNedPCNy9GDVhyNOhNs1Qij4fU6N

iaQLZ9FtGvFOM5L25ZDSY+2SQ/lJwbgPoGBbqU1g56qGdMr0wbTUdZwx/PYl7LH51hg76xK8iGqa/ciT

D/lvc9mA33JKQLGJmqIDNOtRUlvOumfEX4Ba4P7Ux4
sI5Pf3mc1jIebOYGBOIPJAzs1gyEd+eGjUEQ0KaEmAORR0exgW9sNc5UKV9bTuDLSrumoZtnqr/jLlYG

fHNSHZvoT+R0xo5lQ5TEhvqSXpmR4W2es0wKJ9NpMHTQwU646y/oJdxvNCWOlPxj3qgLD2vVNGofey+C

1/ryDc9pL4Z2Al1Uj4BPg+RsIDZ3GKwIM5StU0MRqp
vLWKjXgeVRt/pFE4aXVYjO60o+1ti+IbMkuCYR7XscJXUTHPJVNSnNSmPIHHkOp9MfFF+WnU7iVgEdml

HIoTvb6whDJFwHICY74eda9CYfKcmugZ42Ex3f5wecCgpg2nUkta+8OgyvSvNOSSVq34AXfBpc5xmMTk

41xD43EMGsnUmsoTNblsFIMhtN+iWdDjZEVhwEhU2V
cvabVL9fYYvKGKJ95uj5v97gBwDIJBBzoLDhD4Z87j/w+AAN4cZ3zWN+bMwh6PhAiZYepFwJYkMZjh32

9dGh8X4boiCXF72uo1UzNybaq3BQly8uZRDsciBD9OHyb2Fxwo9APneVGT86V9Sr7Ns6akjRmvnqi582

8SOZqzSPz78RaprrnILzNeb5lAkKZn01XG8kdQiEti
rAuXU9JxBUfBP7EjDg1W++x67vng1xsyf8pT3LLHxfCJ+OhkdB2uc8W8ZAGINt9+YhbuS1756A5ZmzaW

BymZyXO2mBBTKYoK8a5qDsNKLq1ghRtkhm/CuRkU+C839xnDWZU4W+s9Qk78QgnAR868SBOG5783UHJx

A40jdyywHT0num4TMznQ/GbEhM/5nPX8hrUoIkV52n
4JkR5JCwld2/JretZQLR1wHHIPBJsR1is+aezxk91e3YiTtdkNhfTNKeSpbSqilT50Vr1E984YXYRhCM

t4kkvataN52CLAOsmz5XwaD1xvp+KzYMm72zbGZpmewdfVl18qDIkY1dp1r5/bajFba8lGeqYC6Cwx5D

ik5y2JN+/d51j+y0gvJMuSdBbz7gai7vQUpWpImHOC
ECxGBSOfyDxdtG+bAx0odEsLmTKgn+WlX0fheG+QvEGaqO5tJQou8C76LWzj6TOXtSZWSqTfRnAHjaEp

ENKTY1fovgYGWjAmvVvFavg/gBQA9TjAhVM8DW5x1rrxS4OiyGuyHbq8NUiqdiy/zl29BX3w6AKZruUj

ruLDW6fo/oWPqumzUYbsHAthvL/aF1vQJhI9nLd1Kp
/7k5jLEihN288AJWgcfbgy5MfrL+tTD9hwn7KRYWEhXx4XXBcWt+cE3E2ObBWhBYFKLX0hs9L87dW/tF

1bfJ9aA3znzu0Bxux0/rn7oq4e6c4/GXpJPgooaDlEdGC+mPOgUA8ntzj1uKBIU0LALMMqwJrlhdTxuy

8JfSJaumqz6B/3Mg/TSLoeFI83tEy8Ji5B+gc71C+y
Yw5DpZLl9AREoLFlfmvgAj8ux9Kh3Hvw950n4+YqeusBzZzhIzKPajZIN3IWIgIAjT+CghWAfvNCk0gf

tj/zac44nJ1UE0M4FgJNoWATibSAE/pgZuc9rrngnFKT+iZiLRCFzsHl2rgHC7agY55hnj7gV7HM2OVd

e4h+j2t8Uhpy3qhYlif9RwhqmDGiOmZBG4ZfBr6QMx
o1pUbCDWQPNQcHkd3r/X/bQR4SqKTQ+BPGxbszwKgEk44V9naD6rv+qxhfM99G8RoUIiwjAPdhvnjbJx

q2LOUKLaH11IFhtzlonmekg6yZbFgXw4ahCX7SMJ62K0MSXSPoxNjqMHUJLQUAudi4a2k2UHncp7rBsV

QQkRP40tLgGe22SwYkUb/a9s9y0WIyeCUPNjXZquOt
Use24SAPmJzNVDdlhGPvYZInZGrfRge8TUwGB88uh8MVPiDVH1wJUnc1SbzKhPFDNFQLZ841uYZS6vRF

+zZCtssX2sCjwymIubki9TKf0J+HMGR/wo1o6WI+mAifNhzL1Mjs10c+9pQB6U6r+VRXIwNp9o1fG5D0

6OgXPM/lYO/GDAAUWBwzRIiK/3e14S35SF/sYRWsNf
r+8IcEdbunXvzgH3iIsFeV8ViEzXKYypz6LyD94HuDJai58ANszdWgQAirugw9nnOdbYElL1YkNBu2O9

mBc4oSIWeFsmNy4sB94+vfKk+Z4EdGy90qd31GuAHdRSvEIg4rt7B4BIuSUbIc8omV6iEHF5MNtDtQ/V

OrjLJEkZjj2IFS8IfGEEi7mqToj2ibYnX1hb5/ZOVF
LdMquZ9baNuHBKq9EuN++M1AI4DxE6hqzrkkfpMrUvSPnSeXVZH5/1LDk0yQr4Bml/pPyyWvylx2YCjW

TejujUTL+zSYFcbej3I58y+3Jw321i7Ec4Gr5azMik/HDtU+LIY7b20YNcN63rWvA5oKn4yzZ8qC5iQ0

7Nr12YKb/RXQ5NRIHHsvi+jGTs6Bg4Uf04ay/QOAg4
zTQeXQA5eiDIne+oH+SZ707M2o3gn9zRT1z5PCQeIi61zbABRSTv7DXKRbkxkXIJX+iRa8TJ/p7K2ZAn

LgAfX6Sk2jQSOvFYc7fYpdKBPMM8A+coPmdlkYS7llUtkPfyuhipBfuhduyAT2tfMLdutjqokUAfpjT9

TkjjiSFMNwxf4gWYgTwZtVKLmoHnuHf0Jdc69cCnUL
2Wwj4Rg/pobmNmuxdATpV/S1NwzxYaWbluncOTf1MBhtLTDSn6g6NXmMVVplf+ABvCyd4EO2H2Im6R7z

yKQOYfPAU3O62/DLsq/UyCZbTGpQFefyUWj8PpMYzYGJmz3sYEomWvL9qYpIZGEw2NquGY85nsfCB95G

jLEXYNe/OdwJwnhqeBFJfSPiDc7KMRb1VFnkVcoqEQ
HiWNY3BoYPqY4HWB3Mp/hIZfpQR083jVNehndW8+EZYB5MGGU4wCRshEfIPs3IktS3FagMoK+E1tzCa2

mHZvntPp2j1ZIYIFAj6++Flf9XIbHjXXMIqHCphz0QvM2zDPzIL0jfYmz531Q4bEXWljpmxd8yTO1ZEy

6gRePeRkiwDbmqwfHXinvwcbY0HpHuJL9Xaql7ZGJV
TiBAld3VZvC8HoAKf5KTybO/l7UKt7KpliGfg4nCQv+UIk6lQvBp04e0C0XMiHa5TX7/Ea3mHRgM1CaW

ZERHKJ+27MUR+r8z4A7Q8ZPB9sN6ZKL2WkfwSAg4n13RYa5/vT4BKXotCbqpo9KyGeNYrA486Nb9hn3E

2jSV0jocppPoMnXwOSXMFbBT52HjbJOAdyOYfQ3Q/j
XBN/0vu4W7WoofrhGCMbGlnn7yEX8uzl/YhYYJMZWL4khUoNx84e0itBoUIRxnkU1eryg3O7oI7TPQd8

YHUh6fmL3Zo9Tvt+RRibj58NzCOrtJW8jh+cxrbGQmPxFceIiI7bT+WTBhDwIt6QI0hefkwOLMako6sz

xogtxbjkZZh8y0ohfk6FjJ7ZRdbVr/xfx+Zv5Qfveo
Z9aXLgeSh98nKG0RsMgJqwZ971AXL10Cr9JhjCjBUV+JGAdaK4H+em0B9IZto//Ygi5INQ2LDYgQ8200

PzB+lStSFFli9Z6nnOL8GBa+OCivClD21s8wztB6zQRJwPy1R8gYe/3xs6hH2Q+WNDFo0esWSD/c0+fj

8PkEPc8mpOAEIbhEggPNqnzZgPfjC/ropxsw2UvC6n
K1rPtD5q6G/oAbMud5XXVOvsja65IUkx9u6GwLycVH8mI2pFEIqkmqWgUN3cHuFneHyifWzixh/lw1qb

g87JZ1ZL9DOnfOZRdJKxBZK+jkgzC+IedFaxNCbQS4Ub/RNguk4eexf8LX6Km/7Mrcu7MjyeHiQNihDK

e72rM08JzpI9TYzwSPZZmdGCLJLcrLcuG0ca9J9+51
K3RPm+W5Pn5XAlLFbkPFj5ecTlB8A1cw7qkMt9vgW/BAtQMHT7YN7OmPTYAw7HuMY/FQ+SrkRrgbA/2e

sSdQHIS7vnhx32pVPwmc1Y/Wa7QFoMdD7cDqfabtSnyCKRKQskpbeZKYJ1/JZUZGieW3L4k28r8hS5K6

Iub2dHP/0ZWuLY24y19PstN68x0iUbRDi/XC8EJze0
8c+G/iThVOOZ90tY1yIKE0aSTn7xfmGlJ8TsClBVxvoJh57RHc0sQ6Fjf2UOiEbhCcjfkxvQb/ToN2lC

3uciDKpIluc1V4g04y0d4KZt69AK022VQpCidTfZP6svjSlCL2sTzwfeHk+FIpe0OwLR//2fv/cfV4Il

WQiq4qPtCjD1J2cetvfX2uuTlmvZjEaRMTa6DKlkxT
Vfln9eUdJEYTDFDywdpb0HpK3A8o3OU2sp9YlaJ2I6+X4dQlbOKWkNmfcSBzPRqa3x+ArW8XQ8hU9iBF

RXEmhqaM6mEiJ+j6L2/g+4XtAL0Yzf+rhDVuSgGmPjhzDPY3/hkkVCE/QXWAFlo5TYO+bKU1zFwq2y1r

lWRaYjvACIgXVcvfmq4SrKQpuHZS0s6z2lwsyY7sMP
x9hx5+gN5AVoYFov/ZqJg0O5mUABDmp49md58ylKm/uC4l8wp8hukoaeBhJEewkwh0+PcwqRchyxa5c3

AcsV/2giALK+GJoZ3QUkaCVDfo6lkLokQpK1Eos4U67WjANRXeo2GxxLcITxf0WUDXzF05iRQxbkVrH1

apThCMt/Y+inEmUEqqRf33i1iRU6ZEuhpFbDFO64tU
KAuXXBCr2VQxbLn2DZRwDyObScZeMX17G756eZhkzkTRPIQJOMT+7v5cHIAkucXKlBp1vPlvQl14pv/7

bPPPf2crcStNwNtVaTAnM0Ote7d9zg9YeN26d+s0tBhlUrVJ0kGnudoEaNPI7GLpkE44UZiPMMcBJS76

p3Kd6aKqlkfy+Mqk390BTTxfORtPT8R9rm51t/aK3K
58oYPAab3Xfi2JruTK7/w+0mhTYB4heZco/CeWBSxXvdx0Wh1tULo+H0lNgUgYMaOy52F+aZONJHxzp/

Dy8o5RI6kkmNcpCCVsdP0+zXXCYiHn6VNle11CHPzTQN6dWNteKjc1iija/fcGzcsoaZHb65aYtBWHvH

2N3/Vlh33TPHHWfxKMgXBLGOELp/LECQV714z4haI4
XpU/6cDjdbBlKU1OzKUYcleUiXD4DSbFU1xd8skjTtjZY67R3gBPxYUrfXAiwGOClY9+5IZB+3zK2B2H

TZc6HuJKSajh89HPDGkNU1MB4EmGeHq7IDtbrGqxhzzH+SawI3DEp9u9tIoULJ790N01ehTtnUMqUT4q

parl/kh3161yTeK5BCYb+dXilMWxfSrO+6IHHrGLBm
3p+7Dk+/4pLV9oYpSvjy7D+2KIVTVZkoATg4ePvudxF0Hf1K4WZgpg9Rw818ZCFk1D++rVoKK/eFuxAY

QJm7bgKv+QdaxIP/12b5LFDgwMDOAWzV6FU8T/IWNDfQpG1Iqx1R52WNtjTIDu8spG29UD/7eNJdrbYF

F8AwA9aPkZu9gGxjruN7JlOm7XsasP+XH0Z4bBWXmM
YSH7wJPd4v2DVUlv+gGgFyMdH/nbpxebXGk8Lg/EMim45YLD4GG4iWAfz33IUQg6kNY9uHqy9ydFF0yB

NG5BxRKzrRBCAgtGfKbJV3z1jKqaEY38q95ykmFHM1UUqT/YwJn5CSBlCxAV6KzOMNUbJrl5y14TXsl+

a26Jj5u8qLP1scOUoZIOiNVs8rSWOoUbiufexGcCQS
1aGNMEKT3crHQjzgaARITs6UUZdBj9J0tGn9IEEIOTY/gRXPYytIyXatsst0xZp2Kgx+GcdZWU/+plN1

aomCsVZjvyJVR5M0i4R+6BaVmaNIhwpvoxG5kmrq/VKaARB11gVa/3aai/JIfIAUPKbW+w4aukh8Dp1K

p+YbWpMfmAwrIQbCywRvOyOlGgUtg3UrJnqVvahAGy
8s8jTmOqk+TEdnRGHjMOKH0wz6JtiLprRWz1ixhoyOL5WXLKu35Gk4qIS5/XjkxiFQA+UMcHwDO/UE9/

2svhtoH5G9VN1KjPwc9cfZZjl+0gU4fasV0MKyi5IcHUfKF7szEnfbl2Yv0hH/H486C1JkD93xNHbQjj

S+aQsPihebS0PgXpqZmc7QpVIFrEQJlfyiQ34xLXQ/
hLD7YrJMRiMtALWFjRaMzQfw0OFkyImSSr8fvuOUAoSWeKx3FfV4sjTdJsFXlfQ0XMNArHdilR2HScjN

TOx4AsUz3XkDM6huelCL/2WjcbuOXchJMWqJBsIPbTgwIIB5170MfL/Y8DoLhGxSdUwa11cu0eUsriJH

EiCVy70/MX78J+q38+Oi1epH4iGCweVZorwyC1GJx/
frzjG5Vt2SeCMlbJWQd0E6KbF685jCN1PaV5kmIEZq4kjh9seXrHPvknzTB7gja6qY2TXZt+SsErasbm

c1jhgNasl+j471UY039T10vHar8fY+mkCvzBlQEVHspVThzBD8R0hJw8+j/rEUx1v7i5LDqpa1aebPmq

A4cQV5lnczWQrqc8H5OL862JtBRCatsv3bpkvtQB8w
mvlNpkaEyz4iXcqB2YGT75VTvUX06kL+XRoD6crFyDbw4DrwuZ1tdQOHS8rgL78q7jl3PdoiVhdYOUwd

xQ+3bENltAnghd1NUhMqXQyfq6v6LxK6k/DyjFwD694FKMpQ5mnYwrcFJF/KKrZU2f8FvaPayuL/76Oh

uMeY5wuR4YMid1PvdOumOoak8QjRDu/bAsBat0v8+w
M9ThUa/599fB5vo2rLD1fVlfMoD1aweW+Gm+PFz5gwbGrElQ8Sfs6/JmX1sd7Jdms/ymkF/GnoO3s4ZQ

tSJlArSxViFEge2BrDsgiz61Og+eRH8Wj622f0u1i0wWOxtn85nOBb75II4zWvjk2+UPpEWBpHg8MTFb

cNRG3TIXzo5fnSUlkBi7ZBN2Dg93Rd+j7KFlscwi3+
X0tqW/odb8NHHGdpUdyu9hIZ3qSlO1K5JQLO3Ear/jWLqJ+6xP6CkPc+jURenVDx7owbeGzei82PFrlE

5dCaVBOQk7z4+tXTGs86vX4JTHVzlE94qskPVNOR3g0A343tRsLELK7JrTSuY1mJzOHr07JxDgSeJPbr

xugI7O5yXn5C9T4NnKAcQlSGuf2qyqWxfQx2o/aF+R
pPkVaSRmBAx94D+uUQmDb7q6bmJLdzjBAIoM8jy+1MtRY7ZC/ny7SuzMUwdJ0Ldi62Y/GWYy1gEWSZhu

UdjFeTRFqHoO1jBXKTi+WBMzbtMXqM4LkEH59xpc8Ir4i3YbMY1cnR1FPxgv2guxGV1k00l7Mj7Q1Mk+

CTa+1omR+rGa4pSTdnxhihyxiqJVnJ1eJMf1+Z55Id
ZRGApuAEa4RfwE4oSSL/21/pmw7dcpxywtXnow/KMe3aY/lggTRFlrKHXZoSG7di9JpXANtumEL+NZBI

7Pxs39moBvJnmFiPWcvqZPxK14ljxP/1j8Dz5NB/ewSI8F7LiRi4g4VBSHrVFz6pFPE+CSfBzl7igbY5

acrv+lNoxE/UH7z2Bc39ACzxVTIA5WDfrUDxIXTW8g
Vqf56qmeNVtGi4w6ckg9yumVDI0kRyR6IGv3wfqlU9eD3Isk3khyxMNuCF9ATdTT9ycvG2e4tJHmfWc3

Y24jTSKBu7YiL/6P9mcoKrGgWIXaxb8WOr5IoVyrfjC/YFJLVTtMtNS0l5jShI8SSFo8hvCADysKvyeb

WU9KLujkYViHq00RF7J83nHZKit6206pVgUPmiOkR8
V/Th0iAtmoJvpnTvBTf5a56jDliuHaIS6x0dknsSn+TzymereCe91LbEVZDAirmesHqsP8o8zYZx4oiR

FKb40p/MYKUKF/fe2NWy8MK5bzhHmToJmluMu3v6cOunJOk5i21do2ZtmT6fCCNJDmSbVWc0XsFFjsc5

wDSL0hVLr2dCLL3n1P5L7GeklgQRKyflhDu/m7gQrm
MMM2/y57vICEznnzcbg2Jpsbs1xs94e7SeDWl64I9oKF13o4ASB8tJjwsdKfyrXZi6Gj3+vShlggpKkv

ZhsrJVXcEMN72VPBoza0vwpO1/6qJQtfr2q93V1Cn+eql76/Mi08F4UZW4niSVrJZtJ/GO887F4aJvX9

Z4UtZ0HfXCxqd6cSkb1btYdyhv1yBGkwvg25BWJ2uZ
eW7T9E1KVZZobXZCSlQNjHKFcF9L1yGTUhp3PHaPBp38hDg05HG3Dy1UJFEUsJhXBujmwk7pV8+zFOp8

+SDMBUXRwU08DwkhPCMyUWZmDslEdFovi2QBWKKvQG6x6qh+LyX1Vuinu6lnmM/Dbq0hRxEZ9hZnzsjD

3ebKD1xl/Dz39OekyEe5+YHY2bJdoQSytPUtczjh6W
EkEqKJlgb7dHgpZeaVPUk63Lf2Fz7dqrRFa0xKHcmj9Gw6ggGDRF4+IzaDgBAdi68evKS/N+NnV7sSDl

R/+FJhqazFP5mq3b6IVReea1gb3oQnqCfmN48Zk3PFnXT8kCSx1CvvJ33V8uIX1Zkd0f3bUzGrlT25qe

bRJdQ6bL4yIQdkFJemki8KFn+w5TJ2Q3ZWt5/ecZxr
n6pb/6+vKjwCUxE4yKKFVfQ3ckX+Q1DL4CPHSd/SCnbFgCyUzqF0l5TWwYSdEAIh4Qnnv+JDzO5JFofb

M35DsU2MPlf5z8TaXRLoc/etJ8J8FsFGWtf/HW5L01eqnWN0ELgCxRwJc9aAFi/WRQq62QrzfIIpS0m+

B+Ow6wbFC6L3CYzmxl3jO323ucgTx9X04aqtbY4cY6
WwAji6tqn/XahUxJfd5UT/37P6RkasuDwQwoRFB5mSgrJf7sefC1pnjseQfD8EZ6FbLiuDAqJTqZR5L7

0IC5S45/usvU/r/vMn0q+ZRJUZUwV8RyQv0qIxTWROp1RwJ5V7tpBBG18XvElcYSc8gUvIFBirmY//js

niO/a/atsHa98soo4DESwNHS6lYcoGoDZ1Y47wUsR8
+hJp1VM3oIWJQRxsTLZhU2k/EiQIZMWy/VZkj8tNnFHciXimu/x+Ck8Yu7DO1vViBYg/KBMfVsuYP9ia

oGmFZaXO3ca+xJ88nUkgVS9OvR6C5BjAWTV3jaUSh8vx1w3jXmE1Z6F8wEUmr/2jlkjk1PwzRXKCCAIW

mYQ0s7/wlNZkdJ+JkEB9+Idu64bGm5iO34FEyTwsmr
aqhQM+tRla0UC2MJeNgzpoAuQvkuJj0Tt1G6cGV1A3d/AkU7yqguNIjCXq7A/+ekRkOlCAr6imc9MTIM

CfU1JZgmVtVx7EiWgtVPgZK3nIyUCw+WUErlxnb+hId+I2yfDrfikdWLyifOtY6CwWAAO2ozvxa96gK4

9jme+pOhNnEWMIC7IXlp0/fy4io1Bwq5v8GoH1RUCa
cIr092LJd5w9mquU9kpDKuNjgWweJp9aZoBtF3ZTvUzJ9dYb3e9I20L07HQEa8d+ATMvDG2Q7r5IH06C

I+RVNeXZP6dN3+rEcFAEOAyxWzFlU2YZE3Ml+gPBdfXDPRek3hpG9fiBgNl0POI5cjts5tCW9ewXHpV9

N0+9XTph4dbSMX1rOIHks0cTrVsAXnvL/7ZdR1NnZg
zcXP7hlnp4mpoCZLveyemk3v9eKV1o+SE49adcb/Pz7BuxHpW1VksaC2CSwsmiU0gHvS7U8KyMb960td

UoKaw/OET5aZjs4VqTaeZfvgp61kOCu/5Y14S4SgvaQav0Nw0D9Bl2K39oUKe6tbHFmjJKOX72ImDsaw

1xRI/JRUVydvbKjw1h88Q+Wez7J1XzPMppu4tQN09r
Jbx/t9xpULvop/lIQfV1kjHqFadwUpP+V5RdY37cuhCdWO9gids8R/GXzc32m5nZpoI6aVsw3qR7Eq74

ydNzWrPL+TZSM/miuzcA60upuVUaI8rT6uB8hEW1sACK3r3AipK6EDTXQJ2Mcg6qnx3kRMWtsqc6qPQK

RlrD06zIALjSp+yFgigfHCy9U0GkOQh9DkT+H99Hh7
S9snFMKajmELLf0w/wwQlj5GCfRQ0+Xb3dtQu15sCky5ZUn5SigUjlN17c/aAVCUii60wPsQXbBUaqUc

TCi+dYbb4ud/AfTaczEEM5V2XZuewrtOe2rBtCTaObioGiGgIY6EUbmQ10QtyvpgBgilO7FGc4vgKpIU

Yys5nSJy2F4lXawuRFvQZLlAGYyyxNUEZdloXorclp
neHYtaE01SA3xp4RSTW7tMdgoW1pHIOMIztjiLRKpyv2s4+cBRwNEqmtKGF3f2uFfcGTQEtt9jUhsksP

ehISjLV4kWcMtRtReZfZyBq8yYqdrQlEFbwPPni4DG8ZJK0FKMM1sCx5WuZmUcHkHdRuOCpbjn8YHcn1

ZWfkvkSEJGYQML/zC2vMWyQ3YBFjQLkzD8ut+E355s
610aRO6NTI3IhQSDMjCg98WXB7RLCdahl51/R33O7eDiZr853tMiCTQCPPpV/xcKn+uSd2CATBRTEzv/

bw3cMt+vEr1kePmV+6G7AtPBGbLItD8Uz9VIhBGm3P01AiPUbiA4I5vT2+VRv/As535oFrFrdIOynlpR

PNGQ55FVGzutQtEUkMCVCkO7V1l8yh0/8de4oAuYbE
mLPyY1FaA7M9kG4Ch2pSIyHolps/EBzp/rODQ0D61gx27hT7DxXKZYgiAvHu+x4PRhSXK2phWquzc2gk

2uogx3xpaVnNtv5cykp5ZRx7PvB3T62WKLTfOr/vDUXcvRXNT0riSCB7bjUCuH4DSNm2n7Sh4D2nAlmn

UOzsvwrK+u9hisFvk+Rgr6wfRnbqMlTZa+AIyrHgDn
dsy8IxVJEdQSa5al5lwcrxjZJSIM6gSkRs+VQdsOD5Z02CFJtgeRZxwU3KbHg8z1z9ZVB3IEFJ9vksTx

oN9cDhrZs4NGqLN0QoSyh6ILn6xE1E4tC4jDJqnkzqagBz/09yf8KPc2nv0mrIz1Q4w+VptqGC9RtQpb

WRX6y4QW0NIs98zChxFs/HqYUzGtDKFJub7M8+bsBZ
G22+WS1XET0G+aqOxKEvIYLJtxzTThUPk6dp7qJfJTA4WeP7+F++9n2h96DBXlf+P0pOUsfY1fd/qLXx

BR8Vk6XWkrcJu39GNGxmP9EW4wVbsS57b3ov+1iAE1D9QamaJMnCWs35x/MRB5yGVVMXYtsgt2mFka5b

9wFlW2nEPsoTlCsOPFL5fhG0KVDoFks8eUrTcz6qMj
S4A01UpeGYPuQzBkTYHoQCPlOkiH8rlrbghRAkJa5RCNO+MM7G9dSzGyMp87QVEFxvHsC+UwsXQDE4j1

Sp3k0ARn4HbdTvgBK9Lr/BqlrgI+H/JlAP6HI5KgnmzQcxsm59vjs1Pe0WtCSKZD6vrplKFw8D2298q6

Ks0THfVDHTwQl7fF36+eOK6XxhZwygN8tyHzvOAPuk
WG+kc1ah8/szig/Xux9jqRRJDufhrJiwoN9W7mrySH4OSAXDCBOvbco2dCnhHLUHXOD3N+NNS0ERCyIF

H9KPgE9EQoQe/xY/rXCq8vvI0CQ0d0umQ++6OUeL9oh798pWMzJZAEw5IouDnOz2WpNYasSGXhkCNRw3

d13E2OswFC0hy0Jw7+L10YpwzBXb2E2jfrKZce4R/y
AFiUdrzDml1/BmY5HFBPPPspV345nuNoCFVmrBZsN6XbUyXNC7ccCjENW0VNrRy6i9sHhzq2R32WQ5eZ

9Ddv0HdUHToB8I5lJof1amwYeskW9ZO9LXo2M3mrEO4SiCr7ESF4xwRlUr8+HqQrOUg8NjfJXhzxpFAJ

eOjw4T05YK3myHY8p6nuguMnjPl/tcKyzt0dl4m7qV
WHcPpQwi1DNBojdZyVFN4fdmczFoTU94oLFIpOH+uKLKpd4rNNYZ+kSJ2lOMfp7py455qP2M1O1iCXrl

Zu3aPgQBxab25fFzZayU0vuJh5KHLaWVBHstdiRdhNqT7DaGf4URRqMTck0irMXgGYQwH2Ty1p0/RXNB

v5X0GWvConKCsN3fF+NeTLmRj9uOXfQp+ZppmGjBrr
ij2+LLuSBs6IdgsJNudBCeIyjcw71p4O5bbR0ouT5sXuUOgQ/9Wjdqj/EKPqzMYB4EEyle9sXNDLe3bQ

8drshAic8T9Ypg3+jBFMvwVSOczdQci3OI6ZfJBvssGl3eFGS5b4IEZW0BMSJeB25Q+OsaBisOeUZXSB

Q89A96afUNI4EYThGdz1/uxk4qLV40u+YjUxSkfxp+
uHxC9TgYrVfc4qWC0ccxvMmvu85ZgiE0s7ZJasor++9D7urG7/0jncMky4wx7XLy3Qj10g1BfGdmwyiV

jLVhNmCRu4Ag45PYRzXhmIwaGHg4eK0zvRn3nVFzhri9rjoy9VqOz0ld2emTypIpt1+OXw86Uc5vGSMZ

480NK/m5movwc8rVBXpCoV2a1mNofCzVsgYRJfQhy/
tfFcM1uPTNd2RhddFC5C6kBWn1UkQjfG5ffJimMf5rNrOD243TKBSCYbdSRCFs1Qde0RoHNsOjw79zJj

8UChVVHEtpEkguE/v+wBJFaz1vaE2HPqJc/qh4P0WlNmrDSQQ89eJxgFobZ9XezZFvPGomVKKviBOvlq

iz10ryqKsk2pQ47iEdWVbmpTSf8CvO9hDSPE2OVbTp
J9QEwE+gw5e2ksSEleK93NJJw5EjfbAYcZfsd/R5iVLnIQ4gQ1ViPw1NGAyZv9KejYh+aufnpEGjKs9V

YBbCxN58wtO4Te8n2Xm5bQpSU6bP72VpM1pnyorRtdM4R/6rOMeZT5Yowv7aKSM/08EWVY8DBIkrnoDP

7lArR1WmFr0+mookK3jc4I683fGeQLq3w8+6T/89Ju
CrNYuf+DpIf1x9LtTxKxPXmTdV3DOwJWh/YlQlLGdjpt/dAj8DfYYmY/+neEcBA72zq3H5VsaUDW+ghr

kwdmQdY0zv5q7dH4WNLZtLDHZLQ4leEu+by4Xbau188xEhDyv4HaJPnHmZr5WStbrw6x80xm8efCGzTh

3HzTxNzl7rQ8ynwWhfj8tBhH4ihS4awljju0UCFSly
RIk0+6gghsqMTVU+0tOX+qvrpWsQFeDGaijVnMvQNuRHZ9zuRSsgjGGua8udQSZJoeQC2lxAAQfM20fD

bkk7q7m9T/XRzM1b8bPKqiPEKEZhylZrJIS4RsICGb2DsnVXyawgzuP1Ix16qH605YYogpSCIb2/FEa5

jLIKlK7GGZsv2jL8R0UhMlunghpTCp6y3jk5iNs6Bq
vy2gFNh73vM9cVIzw9a2ugjl0uFbXKrSbh6fw4z6VZePf2p9fpdKD0XQqwkvccXuxcqq04T4pIcpfYvy

5R7+6CEZv9P/C8AXMGxxlejv/7e1lxpNqhNwDMiHyX/8LEWBzay46Y4HcVkiCvnecODlfpfFPRD4rLMt

RKPKnPjMhjO4MWrxpQs3m+CFb+wjSfkRvKVxaOvmVG
BGDq9RSjoZT6GQYNv+vjPsGi21ahGP7pSUbi3AZ+k5LbKa2GakF5itBdxgFQm5V0nG2kQkUUmqsrNoIE

rw2NDv8qnEhOZArkdAt/1gAt1qj+q75ljC07lJS03+lTBjgtxoF2WCOn24VLx7mQIyrGJ4ipQTonMUlO

SL8f3n1/Z/miwMS0KqfiTrwkA6JuRv1LZ3ygTbyJY+
OikSQQVRWX0wJPNLtrIG3A8tfAyDEN7VaZJyFO4PDAR2xYEcrBpuoh1yYfvMZ6NOrP/Ws2aCiUS1lvJn

G9FD2aUqjh33GwxoYBYbubrfso5N2vyiJ5nkU8sCp8JwQpj1yD0vaB/OFlJ+cpDYEZtYCabCJw9EQg0I

wrlb2jhDmmYmzZg002LEH8OZ5whfqd/Xdx1eVSDYM3
S3hGHebvz1Rowmr5Wohott5KmawJVQNH6+O5xVZJ4SqIHGWOQtwfcgUqSKO86xzdLVNBe5t4Ic/CuC5+

Cg4Wev5bWYnxfcC+DlSdD5W71txqQCImn8l1UF1l1X8Cy9wKhc+FCet8HRP/fFUA980VLfZp9DdfOBAb

i8SmVbZFn+AlwCgr70yBFh5j+xQwo7jU204MEgs/uo
DOOlui3Ok9dcH5ctDF+Ii3zRKW5wSOP5xHM+fnziOVd5xw/zPrOdEjozTZh0+cck/6AS9bqpByAKkBeD

uzxuuGdY0ni3ArC1vzGEuPflN88IXNGvGt1+zFHJUKsTd+K+b2PMmnMUIk02lN1u1Km1wr5y+d2PlR4Y

vV3XRO5fv7f2z5in3s/WpOI4WX4F6MyAMB6diPxNth
xgIOoTQdrdJP/DLyxMM52snqOLjXT7TWRpW2jM4gakkxzZuMI5ZEz1CCu37Dt5tHpm+gtXDwA+wDcJvV

dD4bPIB0Ddt/qF7ngft+ejmIbzuciUI87yegKuaAiEFsj7lxv0nGkYsCJHvdPQNoHrR1VhMEqpGIKM8b

7skpBnFq3RLZGzMkduifNpU6f8djRy0WIV+t98W9iD
QQZQFNPWE5DVdUpiR9Io2akQft1d0bcrpLdcR+yCU1EvB/IsqR1lkeRP4ngO/kz+Q1xFhwmHgJf6ZOd/

uD36gu0XLnDVZDaEZE1tx3fZMNeVQ2EAxoptL1brYcSbdHMUexJBSeeax+wr5RtiQCX5Xek9i1WJkpNy

GZg7Cye2/sDudqIh1h4TPS4uBMk0d1Z8JHqLt5dz5T
cvyETGIBzM0rrlx7W37Ztkn+zjkWMGbT5qDy9r0ln/RKBuV66646L7yfNGzbJCC4Yq1iD2Sdgdm+F4AF

EPi806CvRTYgTfysxdr40dDHIS6qRHywe1SnmFWa+9tonqS7hR8XjWuivQ7oa6OVWcpZmMweybioq/Tq

AlG1JyWOpUwXRMCF/6+4AiAXRt++pjvZSCjLNCz2Ts
gxLv3+Rc/upzygZMr4oS0OooXjXyP/3lHEHrCORfehbraSK6cqFF4W4JWLAOL4HFuymla9AADjw0kw2u

+HLWI0J/bCLcA+YmIKjSkB3gUX6hAG8cc31HRdpziwen6GE63BnEj4Kk0m/4cjvB3+GxHZ34K8vHe3/6

XxmqCXkPTH2SDjGAHqoB1pOPUVEO8lXp8x356aJgOZ
6qduhsRahyYoKPV7y/gF4OSeybWlYO4nVULtHZCAibMMwg6ERSN/801QAIBnMqL2e+WQX4CaD8ZdjTBR

v6kCJArwqYWOjFRvmgJgyS9o76tuR1LaeHvGE0NQ9PMrEq63bCPBSY34T5P7xIISp+3aO52mF0xZ6YUZ

6pWa2tBpYy1YGsQbeve+vvXXhu9y678zap3rrmdtJP
1Au5KsNhVfm5t6dzipH56X06kygRaRW6X5AD7lPqFtFfPlh+32QYFgGod5huiTo9AKqabW5YJOiex6K1

QWKwld5rnFkv9eD3en/9skmjDoJvkwuVnMeplNbqrfsU9PL/G+lnZyZySeCMm+nH0VqJWLLDn8BVGk9p

mhv5WaegGOHezAvrWUnGE792LHeh8JjR21F09Owbbv
eSTJCpvaT/FI2ljaHCRB6imhfg1gTuAMd7zs0i6EPOR2p/fljt5femn1xZrzknWyZZ4Hz2N7Pe+LMQSb

z4pa9gyC7NoZD3tC9qf0IH/R4sri7EKM/zL0AKQKAOCVb55p8A1gSHg6bAoeepJLENqhCiu+tDpwjH9t

CQK08qxpGC6T7g19B8R+1eXpNLaOwDjgtQE8NztQhM
WnVQ52wNnFZY9wWmMnU2GXqC0ZynJ2BR62riPdq/Dj7xA7NCXgA/boTfPhNg5yWyN4oKJSY/DDxd31S/

S5MNPwQ+EEw8LfCwaJhzcn1a29eT1SzLmm+VOcvkuNpMMSP4VJK56wHMflZtahS7bDcYu9Ygsj3AVSka

47fYPL7vSDSfzNYQYYgiE4WyZBUg3tQYqWoishA57L
8Lh5K4JOzwUv0hy4/KNvenj2fANUCgUN9JWObbMzm6+fK0+nVwRipmi3GqCwXF3i2THsID9DFLONw6B4

/4uaaayn/4cufcmHN7S4ltXbgxOdZEzwDAIWFH4BcodXqGKH26GyMoY3Ob3wxYW7iq3//8n7oh+f8DYE

V4BfmCmlsPe2+ATsffQbxeEYVgcDt4AWMVC0BtKtGx
LbEWPdnEI9L5QaVXmU2vQ1QsTqSroM2JPzMTf+1MJ7rP89THlzifykN100SwO/+9uoCePvpeAzk2Vxkx

fa4WaeIe73r4VsyehjjxXaz6hfl6RuDWvKO+GZvlIScdLccOQE1p7vGsChKaaCbsUqw9hAUjJFykXMCS

eNmvv578f+4p8JYsDf6rqN6WWO/fNNg1vp2h2rR+Mj
Mnn37dwCT6SMnhfK3z/an/QY4KmCzMwfl/T/COYCcnoz25uki2eMgpxTG7sThR/QV1VDFkLl+LnMPbzV

QsXBzjn4fuPW5xonydJpUgwE6PwKuaRp//KbvpefrBAZQjWoxWSGVpZhKbsmEsc7tDHvlVKPJF6K9LS0

HPyQDSvtVY+xutb46TdQciKJH90bcXucIZ+eYBEOBq
XmceQ4ZragABeMekfq7wa/r9A/hgxHcxk15GIA/03S4fyla7wNFmPdL6WQEFCCU+t6aUK9Hku1aZ1wOf

QNQjMRCR2OPjPIz+tU9IiwnDmvHKJmvD2xQrLd1jqvr1Y7eMIkk4d0AJScxqt5v3VpTBWmhgULyDAJ4m

WCxuH3QSbso7PRPp3lKemhjnlP4f5TRTf4V1Sz8Wz0
zm9FSIlyCs3ZgXRNBvh6xkZUbn/OIsCj/Ktfo+DdBIZRbFN2RLfu2JCBF+YvmoNxw0GsuQT9AVSTy/2w

tNilKdPu5xrxPJ08gPEC91CVqaTGEPahXviXN0aSq9KWUkulmL7IWLUhkv97sCXwmu4E6J3neS9T5A3n

cWuu1wwMQPORA6w3u3V0Ur2hsHioQ5Ioum+ylZoBKi
vEf1Q66awCQcbKN6g/4DXv/QlILgDdDW8a9KVXiYRkD/eWgrZyY7i+ZFVJj78BVBtmJzSZQ37TfkMZ8T

hoD/IOpj2anIWPuUeUHsLCD7RMSeJXMiKoS+NbO+ZJc0NHEhOSy3z1Trvvn66oIjpNlvs4rTYh8Crzx0

jLX0jbWLHhib2+wVAoGLlVeocp46oaeFFrD+c/pCZj
z8dtY2qUulJRtNUfpaTgALjTYyWPrd/rC7Z3dcEP8AoeJhIg+kS2aDrE5680rhnjpNagNYfGMvHg1f+A

ssD7nLrZprXu9tnIwlUt8JkQkFt36lQCvSl1h9my/WjDJVhaF1Kp/p0jWynifELePevV28c+qXC9wuqv

De5deo4J79Tv8H9Cjnq+idYyqGawyqsUE3TywRj0UI
bnNr2Rz3Isty7pSWGd8fCHvbsXT/3/KBeFOjzE+rRYi+m8e4Oz29GDX7sq1qZtEtd2LrTXfyP4payuZU

AbRwgMPC7UuNf3j/JexCxGl3rzljz59SKKPYC3ABS27gTihG3BvUe+YNFVc9SIUHKcsY4qT/mzHlrQRm

uo+D2/MKJAIQ5pa0TlmB1TgZbn7nm3d14I8dD4HH29
R4eT+pPYeWmcZYDXEFO9vCisjZm7Lvj6DrPW59cYBRY/4TG+JRocA6/63BKVcJA+jAPMhozJ62xAwfU8

fz12pzkZh+NyqA2KqZmyxRzj2RJoQZNNg+5J/9OVr++8Lxf+O/8d/4v3MsCvM2PCXDbPwePAmbjzVeyy

P5P7d7w3PE6hhmtXnJcrMihjT3zyej19gP4zOiqYDm
N7T+L4eX+htcZ5uQoYp1jar4LL15KfzeejNaJx9RJ5guaHri6zQ7nqU+goObgsa+bnxTWk1KzO1cTcJd

+RZNDFT+Pg0IJLxtHcsMVGhZmz2qOxJlzhOBXSkoOxNo1BkITuV8reSVJb5+885Qx7CDH5CtFu5bl7OV

e56rhgAUiPF4K9dhpxhsVNkscSMg1/HdarZq14aZXM
i8/umXiwmVOVg8tB91rpMAj4xkjN9vvNtx9DiTBfCOchq2mwNM8zFh1ku5fLXy5B8uznicUppjTLdFGb

sh34CTK2b42oOGhK7oGIKYfrpx/Q0VKLvWbEX6wqQB4kwBUlysMYFkoM1tsnx7gaoYzam8DMQFZiIcqW

Az36j2XZNt0rWAiO2XF1sUqRaVJnTskdOtP/E6qS/Q
7NSwCuFI3vndLD2vPG6LemSJKWy+hlN+sPsmImJcL9pnjCbOwvdHiUTdwDEErlS556zkJhcxqUjuJ37g

xr5+M2LInBHKJKDjr8cDAFyXN2vGupUqeAZ1w1kOFi4HBytsh5hqlCgz610D1Me14HjpataU1Q4JwLYf

SnXEgr6IfXangodKWLpGmk3qz97/Wfi7/DAidVsm0I
sV5vPs8AOTG+bgWj0nc4apZyVeepFrC0CzAVLZDHdH/QGSNWxt7KUvNO1bEdcmaiWoPc3I6ADb2Y7nvy

dqVHaYzjrYjBAiz3B1B2i3I/L2MMEbyEsFAJDwikYDdqxTk+wmykk2gXoA1U1YEji5qep2ast5QmqWDr

z6v/IDB1mUr2jfgwFffp5pCtYdAq710cDWBuofMje1
JukpfxnbZtkbGQwRnF9RKCO9oUD/2BR77XCJh00va7DHtAui+ATccKMH2IMoTcbMpBUFI8Mhh7TBmrfZ

VqOs5smdPLUJ4G7+CSp9K/d+scGqkYPquRXbtoQvX/v5tvLGybJ0l5Is+hKhr3HEpN+IpLnNKoEihjvQ

dThvCwl4bcL4uit/V8fllVYm2nSkX43Lmj7cEr7MgC
UuV77/xXVxiR5w8cBh+yWgoq6lhuNRYd+pqyYKq90P7FcWCiZkRcTyOf2CFvIRRdJOa229sRjLcanQ/B

AkQ/KobUojaI22J4Vl/fanta/Efs4+O/GY8EP2K7WSISzx0sEdSrmBIZJcrL43+2MtnplFvf2prG5nqh/Y

OIKHqVJ1xDYl5oibX920VbEYMeY9mEjCqlbv6fw1u1
ulEilUuDF9Qccbg63LPol2YGVaD/9p4dLxdxhRGPIuciB69sJpiXr0qXdd3zt5TSExYFVm05O+eTzwlL

7ZR7aa5r+oH2fxipsJpzJ2jb7J5EllCxakaZUMtEGEjB41qCbQmN/WOuHuGGFKyn7tefQv9fOqeQSVk/

pZTxY52FeT8ZlSlBksLGZFBmRsQUNInSU46nYSdzPs
uVbnSAazDTVo4nhjf+ktlXAadX4ueaHSjSosa9oANMqqWuyGtQ/C4rnfBPUXuLBj6B9/UNkkukmN9+29

o2PVXlxwH3aY8Vh9QsMoEn70Tn/obsFqo5aFRfD/jMRvT/gz4iTaOVIHpUIcMYJUt3hyEyIan5H6U6o4

OZBMDsu8aY2l8n2y9vCXVZdhM5XooU/Sb9n91ngsH+
0Hm2aeFtg7/tCTKMdj8nj5I+UlPEa0ip4ZD+2PJBAlK/Qth2GrCIH57EYTiu1ZRZcA/5pxODIz33dZtP

edp6HUe/wNnPfJkj105WgOPpb3To4Wni8oK6k5rYGMUZPHGQwWPnYVJI7ztfA1tmEUcsGIIepdT7pWHC

COnU8iOh+gVlai9iC1QJ3PTpQZsqevg8YHaYoZsbaR
qRdAIaEIRfhr4X6USo4ZEhlWn0nwvOy+ExhbuBuqbIYIt916J/gntxj9X6K+k1ZNL0T1ufgCcR0tMb9k

vzwlGZD+zcaqqo58D3pP+7EVS9TPyREWYxTDJTp2IY01YvTE+cA7owmDIHVSkLtKFeMPHtbYCntPN21j

SViLlMR1frHibPp/SWLTw301momED/vhOnr9+WnOuK
7WhaOit2u6HWJvTzyGWc9B6d0ntk3apD6cv+8EiX1JRcuGQ9HMQbA70PxWUKBwSfdJrdntBcguHX5Pav

LUk4Z6+P9eOB1COel+7Dz0CCEeGn9dYyzuL3J5wupmuIcesLnJZbYbvsrWQ/U+mOfhBqTS53IDp1oudQ

VPCkZVNhnyUH2shuUBmOSqk66Ephd4xzm0eto6KrxD
JCrkzcIn+uDt1UbmL0cd+Giovani+ewnBUe4byovywLfxynRcEN8p8Jqa/8CQcIU6Gv1vIZMrZx72fP+l1Jr

+ddEjEuwtzoxxwngrvSZljGvvIL3foHFILlXThfbgYFgB8WyLQkDoXv/xjZuA8YZCZpdFfi1HHahiXvp

WBAOIBpmpMPy8eOH2vl3nTccrloMwGxSSPgBnxEeF6
4ddS2dLpAkILCqIM0+8ksN/OiouGp1QUBJlSKr0wR/uov2d7hKmvgyTBqxdWFpP18SvoQ6Jc3zY8PeCL

89BmXMp4hZs5++Fc3gquOGwJWM3V7e6+2QABLQGhOKnxd9oGd/Vz4K1UlY3cb0/dtmo16KUUg4hbLCeK

tyB47oLFpG30pgWrK+R5xNJ36ZiviTzyXFo2Cg02n1
p0epw8nLTALziruj/yaPweqbBwENZAZPvwTK0bLpIQYBBmqRh6J70bF3vozb4p4oRzucpXn8Fg6Js5aV

Mp1An/PqfxuC6Pny3VZjhda8sm/gtWaCPhEYiKNLXUGJpuQL2Vl35xMbLEMpOZcnsOcl53i4HifwxDIG

PG9mXgE0QBIdDDW7bBkS1sYynQAvZMx4WsRUOQQxwW
ilNfg2gPfkDfjbHtv7RIrzyPZaFcf/DntPJ3uJjQKvowUdDQGPyWLG+vLGa8KYqUvXQQEMZVs+cI7NYL

V4p6QxyPS6d2Cg4IYWF5TkDAZIbog3ELemEG65PVwbqAlM2Scg525UBgFb7YtYYxQSTbQBBfhayQnm7y

lrcLtyuqVRKR9fTla3E2GX63/O33zyni8PQh6kgh8g
TbutkEOuJwfqxHFQxNsHVZZ1gwqIs7xcRMIhZW4diwcLWJ7ApfI+w8+mbJ35P+cw9IdzQsfOF52CSeDn

q1kGUnh2WS/9xnt13P9bR0MP22r+2Pu08YKEioyB3s1k6mU2XYJYClL77ESZp3mQyNU7+pxlzNOuJBlQ

RN0T0ldICbnViqgq5N6z+3bmekFImiEnm6RD1cYWcy
Ize2PVuc4kj84u8JfjxsVyFK6LU+TFg4hxbivOdBeOoL9AgLjWNH5R3cj2f0oLtuzqLTVW54VEySMgAL

S+vkx1+K+ki8KB+aYn3BuZ7LNyighVt3psFqUtIUDqJqLemszw5DKnTY4iUOQeic/LdWQPkcTeC2Pk+0

kqMR5TPHObchpe6T4rR/M/h2OIQ4jhTNEqJlZrqnOv
JXfTDWBACZzHLdGUxZiPsbo9VbqEGPJ4aGJc5f97Lu7n+Xv8ustlrYu0rY+4iMq6M0oKfCLJ8hcw/xgi

0FC5Z9B0RINrOGr9LV8jax5OY0M/9bYPLao4Jhw2bEIW9x64LdCWl7q7GTN2UPqbF/1Ka8gfBT18CvrG

mO+UO4wp4JaeqEJ2Uv6mRvW/GknJWs4o9DnjoHM7u3
BXj/Yau0fgHQ/aXWCdH0nEiwZYikS9OleLtU6qUxulyOGHLQvjT9gnvOqq6QPKDw/VbeMAjJ1weMddEf

vIVBv7LKQJvDyDGHGZfDvLkE+9KJFsprR7qhOy1NCzxzaLGoC9jmEaCeZO+FDDvSYAkWbdnt0aomJ3n2

O2AuTJ2MyorjXZfx5aihdGLzdFVMDGEzfGVyvnKxGJ
Bkwkztm5bNH5eavLkPO9+Qnfd4BLib1PO2KzgxSTjzWbUOOTaSThc6sk/BLQacIw+nMel53aj5ri5VLp

VjfSXCoN7SShvxCAUD36eWU5I5SH8oV4j62/fevGhSrIbgLDtIU+ndHVXMUjGNAge9vA+4QycU44mD7f

cn2klYZR05Isk3I/UDQQem70eYzS592RvfON/xhmUG
5UmHb4KTFJnrMjI7UbXvtlkDiZ7/FgePasbnFsoyEOmmTP/XOgPzp3nwPd2bSCR9PDQm62z7XK0L8mIL

rhscWfrwQUNm8cLjTtT2M/UQyI1ervuuph3UYbXjwT+6upo2eyY5nmA9gZELR8vIpsWTYy04QbAyKP54

XnNGM/to5VA7U+CpNU+MJYZWFmgIvIu9EDa7hKd8LE
UabxXLfVw38RDrDPxyeK1JfeQPFNttsbIMkVEY3mU8U/VCnbfd2iAbr5FJ3trMhKjqIQnWAeEhTJCu7b

2yMrAYrygwb7Jmj+HXR5QYdqxm0K/jIkejPBXcuqbw6eIXrBMCgzobfK3mYZkMODS5ouKNbxC1BVPM47

+kOQSVxgPCGmH0EUWKRd915DI9At/e8aprV9gzkdtZ
9I+DsQ6psfI0VOLvGKg3x0HylB5AEtCZvIrXNfHnMTGTF7yNUL96qW2a0igtRoS+ZunscK8BSmfN12kJ

6/WU6qFCCpwighsaxzmWJ7z3XVBZBfNZHU9WGlpUHrvE3q6BDnvPIVhzAP66xaf78RKOo3ptSbhbutdk

9JtGZcQQquxR6a99faHb533KYTSjWDu7zBivvasmCl
8mDYCRgWmAgtCBPKZwwU0UeMz1Ys7M0VIZG0e7eo9Hqdb0LCMnspfwAyMprse1bHbnAOo3ALtgXe4J46

joN6oIVdJYCvHZJGGhGJVG1gZ4Nss8ordfbdBU4+2OFcQ9yW1h8s3mPOu379/lDxToY/5ST9mOmzhgTD

UZukFeTi+PKyMRjwXH+Rtp8vhQDwBo1XnyIYIEAzfQ
7rs12BiaDeT53umvJBz+5/yBIsOtmZBaNKVLIj8/Z2YKPZoSknnlUqBom3uyRW3xSzOiDJVAck16cdJ9

z4VO2OImP0ko777XSSv/WaavN6TV9j2N8D2b0Z6FEbUqKGITdHginaAO6NSAu/QikSoqi9Yg2ZPPGmCb

JGC8BCAT9ZmfodPlA3nkSnvpAbWpoyEdB3407BCvA+
DJLT5lzMe57UYU3EHCgkpoakKjTQJETCwWMQhWGZ2zp9JF0Tb91lSeWQn3oVKpwgnBXX0mbHcJANIfpC

FG+Q3zU8d2w5viUvfds4mCeZAtra0P7dQNnqhFBla4mCwMoelk7glLL/Ko1JWenCbMUsdsb13d2ggxlc

W9PEoJuuP7jf5PkMlx+SrAQguoG5YeQhkrSkqDT8th
pGokCkqXvuzCu1XE7PNc9YSvq0+3rfSl5LrNRLn9Rq3J7sUaHY7ap+MPQ5k0D+BN7R9y3pDweDKiKh2T

OVwz1EZHDOfj5kmUks1/NxQHyuOPOK1qpbT7GpgEIcqY+QgTOhmyqWugmbx7tywxxz6drg8cptFzpEz8

E/P1KAW+s3ZxSYzmmNGKn3b7Rrm+sYYjXdfNdQyLNt
udqUXAAX49shxgWrHbS3Xzls9hOqXZMNL0O05IxhMC5+RJjIN6V/blrXxZZHO9tQmOp3WA7qTfNCOb50

HP8ac531ZtFkemB4N54jAL5grNejmo4rBhhMx1yjgFW0Rz2iodKzYvnWhdDQ/Lx3d7cHhvauChOwj93B

RgRU/PTUXgYw0MHADmdawLbXuoaR7WBgVZBts0HlXo
0y6HcJH124gmfrnk1fLv15rGKLZiw3E8+EFUpMlLyAnV3rYaW3/SI/CDp9ridkaySsLNlvDrTIVQHLGF

90wzgQMXt4843gAUzIlQ7RZj9Asl0QK39WC5HQ7f37oFi5ZfTZNR4zwTk/bn2FDHd5hYsgDgtmWyZru2

xnYfei6UiVEuzehPCZlrlyRzHjnEh0EntpoyfyAG/k
B+Q420wDhkqSxywW7WzPCiW2s2q9amJdlDsJLo5WxAiFsfwRRLocoCl1BsEc6Re8xh5epzmOF+sOltgm

0VNaQ7h7lqufcrS+KJXJR9rRH7SXYkoZGeNXXhe7+sGmFcIOoQ/7NJqzmdGGlTTli2TeGcLpv0sUs3iz

2x9sgR1wUK5/8EIED/wbs7L5Kj+XSlwvCIkX5aO87B
/1vGpBx4w8DHk5eL9bEjY8JGqAQokIklJIpWGPiPvAph3wWmhKR8dNGZwg8ZxvP0jE1oUsy2UBJR3TT0

JOOGesujdK9dXKSjskrvD0PxcDPOLW+UyX5JHW5LISB2JnaSbX+63AUJT1HZW0g5pAVWqWOtwdv2B9oN

cWPqVQzG7l1UQAUNr8Ir+chAYtndWxWve/O/3U6ZX4
kYgwHzpLlJE7iKP7TehxgUdnJv0twnBc/0wf/RKO4pn5ryUIGaOyW0ZmfZ19QzMKqKXMBLpRHTsBX/Jx

tswPgrRUSAn7FDI90zbLFcwuuYAifckooyzEkzy6066bzFcYcmCkuB+4l1WSvulDL3xaUca1DmOGFbaC

2iUFJ2xyPSj4QLsQVtVIgYSl0F/X8GCXxQ0YA7whVm
m+8aHUlSdg1Os/trwaElAuoEwumRdJayYpV6jvFRtmYfFIG8+/imFh/SpYvGJEpZnBN7lo0KMn+wEOJR

LSW8lSKjxEoBSjp1FKrDtbUm0dKFYNR8QokDQ36vm6+DTwYhgqYE+N/wnvL/P3uPInQlmSObL/c5qfTZ

nx94dgjUfiOCKKm8jDiEkODC/Q24+1F5K0XOpqkhHh
y468BrMNOdmteAlqHrWS1WTrSzw3PncF6RXG4SpABPwRJO0xRWnVTVw+MWWOCerhR8LktM3xhyUeDK2M

Q8vVRXu7rPzRPaSjJT3aBmD/r7t9sZaxCeMys5d+GA2qlPztUTph0wlknOmFpk3AjTTPHwSf2poltlFQ

B08KT9kNWZm3viHWthihuf3wH043RBO3THS5KYGtr7
2yqbX7l6i7EG6pWUMY3eXNPpmTxNtdS5m9fJE0+m0lnTfDV70/SwnVLn0TkAS8Z44xR/53hm/3B0ScrH

RpTujJXZFcFe0gwecowirmZoDxdEX+ozsLwWxxZNZq6R0/UhNh/Jnks1+43msUI/uua3G2J0AhWQhg4x

ZxfvkZk9kZOP2YGZnSzCP2zG2Hukxe+xkPF9QW/XlX
ypUMBOq6NWEDqK0V1nNx/oBeUgd6aacXi4FLJ01g0ljFEXqnMByqOF1HE4XBWhNLMW+T5E4X5QbLnh3N

HX6r8xrZXUkdXmhkSvl7QeDxsOj4g7NonT9CYlhQuIj2XHKhGASuWDvUNDv2pstF3Sh1hNcML9JDw5Ar

OV8HHeReZ4/DmcwWF5N/VfLY08F09FUn4Ima3Dg9Jt
9wRhD5HGFRIOfqmrRuzejrXv3+jqlwXr68dMXLPn7ZCi+LCWGe3UIxH+8r69aR9jQWIaImzj+LIo01ca

dluaH4uHFJy6zZw3FuJG8jECj0t6hONdrMLaThPqnhygoCSJAt94tiqaz7h3rHmakWP4D7isR5csY9+V

3afLYsf4v+MME1dzwDTNKzvjp1kknoWBJVkBiDj3aG
O1aavdGMTkIAKxuGfUO+HpU/jkLTMJAK2pj+JeCyeafCqmmbasYp91f0tR4S4L/XoCFEsfiL5tAAXF65

/nR++juxzF5of4p5sRXn7f9TGEwKMxfzMYbn+A6zZWTNFGALwyohx9M9kriCgF+N7dq/tqhnwGIcnmnj

yf9YmOAnrmozWw2VJypPn9n2fECT3ZBN8K4qiBnTeW
jdYqv/cLbvyiN40uGVsxhTk/p5H+/nh2trBR9cnxT3WwHnRKoWJYg6PTUFURrIuPj6KBbfm6+JJys/v0

avQ7tug97ahokFPcVrwl0w7YAe2MZs/E0wlyJnVSSgr8LWqixBl0+K2alTcJZIGJ8Xi49ijjLHB8kteB

BEwL8FJEXIECMcNBSyb98FJLh17on+W4ae3aOCBNs9
GeACXLt+yOy12gdClYhwP/xNw+JWtldBN1kKD1M0VyWgBH+FsLjYs+Fn5/e85jIHez1nxwCesk/BWS7I

tLMHeaBePemw8F7uSfpyAhcLmmD30HMQpWeWLJqYAjssWN/GMnfJCVM6yDT4pNXqTtzIAyc1VSo3YvhG

9u1D/Sc+/vNiGeGNNSbZhitkuQqorw809kPSNPIyuZ
EjISLaftA7G7H8atMX6w1oyg17BfYn6sE/9ma9fVSisP+zP9eVDn4GcW3S34Pr4AYOleZLp3bJztGR9Y

NCs+4tZn/3hVQmu875UjAjAuO2p+tfrVdqIbu2h635RXImy3QA/j7/BcR2ek0ZZoM6Ww1vNKTv9ZUFQ/

tOi4ZJBGlumuQC1OYwpZIp3tfNMYx0SzFIDh+/U52H
s4UbEB1lNrcaX/TUc/r6s/+i70mmgVeo31ywwvpPlUk1bQ3T3Mrl0S259JxW+ReSd5YybT0zbFbf+tcN

a9c664Ga9l8/Cz4RGqB9ynmT+qBBW9VF6Y96c9dJHCvgDZ+vOV1ou6LRCfJcpRn/CCqGaNyDqaH37MSf

9fTZDi7Pqh1Flw4th8W42qUtWPCyLgc8jxsO6kC+bI
6tZu0wGRUR10jPoSZaipRu54iRelz3+vPR81z/feVtys8rLZ+jPItYkHHp8G8eX10lCWQnSxoH52R53u

Xa5YYfpv1E4eJ/FGg8L4BpuvbizfLLDgshX9fMJqLxBgT8ifF82Wu9+LsC5SZ+Q67gJxXPr6+zNWTmUi

KfAhO8c/q+Wa8F2o8+8fAHcfuAsO+RhuVZHY3gCtF/
KnVnOtMs3m3cHcYOfGQHFNSbb7Sf/8ggDj2GYTW0DejXhBSnlBvY1Wa5FLO2YgzNWKTz171saDQiw9hs

+Fe+05JbaLWalxvZhMuIGJgyVp9Dq4vI4IXdgcwaULth+IVCEa3ltsFN0cxWxsinB4MF9qGx5UgqHlaZ

4JQpX0Acd0oOpoJTs8YKAizlfTFBrSAdfdgBORoLCy
HQACCx+B3hSGKY6qL1nudU7Oauzlw6aX0cSSHzA192QrJq5yv/Eyf42frFu9NiJ3C/I+Zu8Qb0ZahE4H

4sR+B/80CscuBAwXaMP0Uw5mXlMQEj2kRuW7cy6La7/n5PfBQQpU90ezYXYhs1B0amn99A/K5FXyc2n3

mvAj0Oea6LqpHKsKS0MvgdX7pnXVQo8wUKmZ5Gx+y6
GuUIx90t27fdEoy21X0BUMbz8zKO5ux1iI6C1RRq4Q1YnAVS+adYzwHi2kYfNxXHZ24DJkHpfT4I53Rb

/dCS11PHcLzdC+kKs4E8DUQTBk70ma2oPjbgg+9ilCA91I8SLzpv2w+HE4wqIhcBz1zUDFcwRMkjX5MV

BUKX25zwTX8AXr/YXEvbAMjuKBKL25ZR0q2D0o3AAu
pYEmuoSSuXvJG4zJA5rh6bagmXvvtST5J0CqvW3aAzIixmPW41+xxxxq0LqmY5zbf801qjSRR2+d+4Un

TXwvSf5At9uM8f633/3WwgwaqJIEmyYT1d8N2ovd4xOllstp+ne/hOxqCrOJwTo56nKMO1Esbp4r54ao

fxSSmueLXh4TdK1lh46S1BDlPeSyEZPBchpKdPHiQu
WLxksZPQHo1/MRPTMLOHMYngQhjkSgwPcwSZ9Bl0X80eKuUiDkwlCTKIGGJnPcrUaGmqazd+2o/H20pv

Ggf1SBP6J95vmPu1TPKRTrm8GZBblpyZJ06wqO9fPgGUycDSzc8qkymAYtQfWT7HRxiNxFYcudEzpeoz

ejzQtzxfjVy4F6re1a6IC16w4nLFInhi8ArXv09Fnn
i1FTii8TYSQYVgYiH+fyhn6YdKTU2467r3lf2ed8UB3+Ju/Nqr54X7rPlwsYzjaoDbhKFdrXX1ox5GsT

0wIHun1zv5aQScBdXNZYc7/oSrUHcDnjF2H17t0Yi6wzWsQMnnh/nxZ++P/57W7/xwztHC+NM6EXi6Ul

AHSOpN6wMExcgXx++s8fFwmwn4jt6/oe8pHvikXreX
56G3a/kubAppn7r7H7110PmWzldScqFDzboJvSyJRDpJvmoWQr0yQC9fUt+dXO8SQgyRKWY80yd2EHCn

hRvUJ8+E7HJ6rSI2U++eGVBVKq1sOdq/iDpQUQsOscEbSGOH+MLrQLJ0hbOu/mUipbkxjpCDA5mKdsWv

NmE6eJUM+eQo6DgiuxtNlv3bHT0VJfWX7HwLD0oIN3
yaX7gPTWfiLmpxjgDhmdg++2jQApi27IYNNOeRJwyOiYIr7m2B2mEl83iofHaojqmD/wQMzhaTCT9pI0

JK0FZjrEt5lQZmsoI53rNnVfh2kZ9bPiuVZE37pTboQLwpmhPh61iY+CHx3xMQd0c1zQ4tU0z9mfjjW6

sW39oP/3plvAVo1IAAMI3GfqLlLVGNYptAqcI+A+AW
HD0MMg/RbkDiNhDWOCSRa6xTOCG1aYYkHa26Qw7X/Cjy+mnGuJkOELV53CT09k9FtIxKNU3KnC5jr7Jo

0ZH0OX7STrrvsUv1ZtOOQRU+FcbewT5Zc/4Wc9hC3jBZXlaoJpfrB/DbP50XX4qsD5vDtjjZOVBDPLRT

ARZU7P+CPKglHw9zCMK+kTPCbA4kI3ukfvskrKcp+G
3Lwza6548Y84oZ6MUfRwx5hC36u5+ydzyHZ4c/Xg0RE6OdRGycgwzgXzcAZ64ZUBwJ1Ag4kalpfLI1xo

CZTZPxJWPJeCLUEEPNxlxL0OHAGEzV3eu3KYxm112f76j8sJt+9mhMBGihxscd2+RTnXh3kcBpTJLm4x

MXhyx6FF1f034a45Fg39KODv8YkCwky3T4vvqSI4nh
7p/Kcyo7y5DAxfJaeN4Yq/9DqJXhPmDJz/BMD0VEAbh4O86EBSZ3/l6eWD3h68GoiuLcr7OItqves5cn

SZm9/fpn0BBl8TRSfxXW1uKqlDY2P5G4h0xmXSVcuegC+3vflvYrT4TgnNLXfMejHf+V2JZoQSMnctsL

LWo1RR8t757lJ5SenqnnjEvCbf147XawqKMPdD9y2i
YjtzqSiuK/WX7Nd6P64t12Hxx16MzbvPE66SNGYkpqmZ0hiwxjj0YnEpcfCnN+nc838t0BTIfznnrWyu

LeiLkug7B2o087iL+KyQVRzofEED+ALewQCKE+6RrvhcWf7R0Dotl5h7MxbnvmLsJKAJqEYyiagjihUa

10siEjXNTajP4Grd6S4PqO8RbN547qPg52/bBFjDT6
HkdzeDWmxkbR4crOwGBHFv1dyTUwjBu8E9zjXh3Cb6OeUGP8mhSSsJTHhwEbMkV6Lq05SAGQFhg6uE7w

+sH37TCFQF/XQeDmLY1+Rtw8M4iNRY4gLiDOYSaPIRpEwZE8WCwy+3Hrj8v/H/ft12169c5czi3nn4V7

KbcI7UdigrELgYclgeDmJzQc/zKKEthm034ljTS/jh
AQQFgFFlfMD+KE+AdbEHs58vtdJ7AmT+OqExo2MFx5QtqS6cNw8/ClWpnF2BN8zITuyZEFVtY/+cw7/J

Be4vhzNYj38CqHIvl40+eruI3DIvKAQw189dC5MRrZer2Qu++JQOtVy2q3gObFQpCRVv/i4U05m7D1EY

duQkI3I/EUwM2Q9sK6xSnc2+FDbsg7QlN7G8YKOOZY
bI9/kgpurcpoCBOnij85sNQNqKBFMblvbtq33TAIaXkfz5Dxabm1i2wOivQqcW7If6Vv7Kie+XQ9oH8+

/+Yq8Wpui6CPuyz/8s2S2A/uWs2gLxI3yv90ccer6kMxkhnbONTruWsRKyaec/iy0q4Oaht8GeCAPQKH

/qT7Od6C3CEik07vzP97l1/JcE/8X/1OUv+pdn/jQE
7POndX+97tliS7i0g/mUibgIy52kDzh6l9IqV6z5lw/XkICmAtWBof9J2rujeMtSnBpGiM9hZTH5/Q+B

8va/Fu74T4P6Gyo1pWGmJZ05lZuvQLNRr7PcRvJY9QSD4t2zI7Z18HxxplCcth7tUZIe6scz/4Evf0V8

sKl0tPMmJ97MOciHwD2Hlplxd2cO/2LHUxj7B+wPIV
3+pb+eidP9r1/Pv4Ry+Yu4iF+8BDjAEwkRNxGgCdg/VplDor3H4/6WJM9rplMw3JiX0HhWzmS/AbwJz3

PAqLw+e0I3du4qRtPefjX2H5Z/LH0zAr4o0uKZWch9l9h/6aR57x6DDdc09b8XlkG3DJ4YPN+KJD8B/m

2Km4aDLHxXGR/WHZG8gzM/L/E7G2UNXVZ/XULa5/Ev
NXl/iw6dBY1taIeGJw3cmJWFwQQ/Lz0v/DtDXlT+xnjeK8mhILKQV3F6l2V/lsxErP/2TNtfW/zHVNb/

keYowl7Z6yHK3M8Xk8XwljoVU3FQhs5l0rUrJRvedgIjmKPAplPXmNf18sl/24J6h+g3+vP2cFIxBT/E

CY94HE7/AtPNyvh2bj+D2vQE+NtPsMtX/0OFJK7Cme
kJ8BdY+e3L8v/KRH+A2Amta0sXKW+/kYP+FurOn1Z/ZwD+f+Qx8f+N+S0kzS5zVt5w13M+JfSdH3JnB3

ol72e1waDj/waI5A5Shr5aDP/7xphtj2v/BMdUxHP2/rsIsDxnzqVfofLwDyOfbdlC/McRli432a/q+c

zIw3xFxI4nTnB/79QcOufLmhzs51v/7mdnm1beFAe2
Jn53Jb85Aw/u459bUoma0B3zAR+yyH9AT/B3wHz+l3J8y9PUjVIdExx4ViRXF/e0+3K+9+K6IsQFSffs

FzxUQRMfPX9cKKZDPBxe5XBUYmmRPByez2RGkdD2US2uoh1FWPS0h+ISYoDNz+ShqBZrnf0Ac46s3wJe

Wmrwpz4y6eN1zbLr/fBwNarkrIY9dyWrc/yN9Atnbz
F+AhD+kSHN/dt29XRk/EtM+4/bv/yw9Wb/4OCuspqRKwR7U3EIM56/iWp3UpGcPbE3I0o8X5E8l7td3q

+I+LsAKY4tiZEhYB9OX/9v+N6kD3HBxm+jWu+9r70xWh+6tW0VZqR/cfEvagds/7LgernRNcyVuy5wCs

+k62nzK/RIcP5c89X0Hk3F5v5yXFPShLDxa6N106Qh
aLwH/6H92ODertKfv7Zvkoy/APr7UsK+SJ7BRvFEdo8eru5Ra1+xlPqtyvst/Imb9W+Jyd2fweXJ+ebx

F4f/yUZoklwq934nba/B/5lgh/t5voOaaOy97jHai4iZlBXJ2NnSn9QR78p+pBat03AmIVRmJZJyhcCz

ERgiF7Cgn0pl0zPvyw0fMiM1WW4zavriyc/G955OGQ
InQTbwy0Wx7qF4gefOvNS1vjagHjTrgvpwp6HVk5dsJox05PVvYBoQqEkhyjuhGngylbJ65U4PfLmx5F

r9i8XgXt9lYF257wDcegnMZ9YPbQ2+abWRONCTp3IsmCntYzGVAulyETJeFQ1+c2tDMoPTFUCELbUHNt

mqkndSb1n2sTMuhGdCyQs3hJojn80zy3eGZDlleAlA
IMZRX1cCWS6Mn+9SMylff9yiSIOYuA3J7wPv9IniPxU/dupaPz+uNXMzlUT+zgF1wp7EAbU2KEbEs8aP

zIuD1QUsbrGGAe3mLCwdOe5kqm5KuLOc1EAVeLP8l2t0gKyBlpd7sN7OEMVqFyOvBWWMXwYbTxujwlab

U4h6oQWV1QxooECQxhcveWIRqUbDEIiHuwMCj4xdFo
KtzqFUUnH5TiUY9FPHpzfX8ct0g0EUgzrHXljGl3Aco6ySQ4qlSEg+BIgnKWzNy+M9XBQB20LFGuKttn

6pg0hcaI+kYmGikW4RBcgxujniNwDYePgHeMbADoDOW4IyDzfwAjFH8qux3lLnvfdDAjgSLRgeesWIHD

Tv05CfNJORK+gpqnM9rEGLFup6oCiNONSpXOXexJBr
Q1JeVAo6mRap2hm/7B9FN3dY4dCqstZ9oW9b7rxy3SHEMzjeVKhQIHBp64AKCWqjC22ZBDMDsqhrnpYE

k+etYNHpdwbcEadaZEAHCqsVIavCtmPlHjdl6lUdOLyL+IcOvcaXczsyxNG1GW2jLmp3Hbbjdv0movh9

oAf04hCU8c9kOf9smZL38lBnb+BRrSn4DN/GBfae+Z
C/Tv5nNJYP90JFZA2uCGbD4DpPzb3bycgpgGjD/lKM4FHhPGSEnkPOy4pB8SZl8PKPKCx4zv3aqndOJo

O2A894yGKEc+9h7GOkY5Hlou0rxILilfKZczFHVweP7D2TZ2cBp+/8KTAUpWIxTasVowbN7+u8qqgUGl

aHrR1PjVpF5KHlKDFxt0C0O0zbrdgYIXeAv6ziAkIK
HgXPTHESfjd1dqBBG/++bffZ2izGc0al6qVo/v3Eh28/3AmhIpeLOFwnyppVajvzZ8eNMMYs3i32XIRB

xMvgwo/N4cSEtMXIWVDdZ+QrYIDtGX7c5n919towVbE4tupW5kxs2lGf2i7uA0dB0bD+yj2jBN6MBICj

Pu+nDfV3J1VAxoFUyuxk8eZgGyTQbqxklZP9SK6jIr
JVblzLTMevlIFfxd3KNwVJQfAtHKLkI0YJ2/Nolie9aA7eeGb5oBmiMZsEaiKNTVFp96dpCRy3OH6Sx5

VO3MxBZpnVUwoIdKZc8mt/zYRJEZYsSQXhYDaYm+3Ll/uY/egB7uCln6OR3u8tvZVot44YVRliu+9kyD

iM4DawNb4hMKQyELoIPJIyYwjFB8iQMsrr82Us2IBn
RtEbXnx5ro0YpKVAlbVFfvOrHmvKuwtJDsMIdvhGCagi0zc2LH39ikshD7e6NiO/sz6BrFk3WtqFIOKn

PjHQKecx/Cc8S39Xvrl7FXk717vAoD6LEGw2HXWBB2tmz3if0kuGWIZYB+SN2NaWapklcCDR526USnsT

PCkw0Wl9wihl66PcYxAhdjxk55+UQ/M0DR/ND44BKo
EzGUILxDcDFJ7UsgMtUe5sMAyMc55TrPMQSprYhNU3Vl7sfWbEmH5/nLQ1Xutip3lzrAJQVhR35wPrbM

+Kg9QDja0D6YGmTA19DvYuOdBHRTlBjT6pNR6THZyX8aR12dWY8qemvyeMjb4K+x+OFUTpMYbU+mNhOP

Avuoryce1oXYuvGT4UzuZIMcP8f5oX3cwXisjqFAJc
WfdnooT0rpq76WkIAxdk1sxH8ALIgGseJ81wLdEjPvHWMphb3GqSQmWQ10aRvuTMSpdx5W/Ucnk5xnfI

lde8Eb6Sed5RN9GxWh2fVum/F6MWUsWhLNSXPJkz+BYm2TUWKaO9QIyuToQOlNczyYwNjke5QUbJzdKQ

SYnTkSZpsmRnjKgMORrKWxkqvhyWJWiAJ+gaXt0Dsy
rMjAQ4hpewk17O8XODS6vgqck7Voe3vkbR1f2x53GbFZmlVWAl/P0SoQpD1q3kerHWVg5D8477doKn4l

+Tu9WZI9G0XDFppR2y89mYDOA2lVBksFeSvoeRz/QMjaWd2GjQhvOi35cwka3FvDkUek7QObOfwrHT/E

xtgMpp3hbM+H6O7X4iaJ2phE5ZyIcTJCXjNfu1v12t
4a23OYOL2nLgdVoKhqCiVsI2GiH3ktnJFNx85COJPI6G044UMi6GyNAHeVUdgaHKkG+EvL6rJVDJC/Ok

OFEeqW/zF7Osb211maSC53BfnFtjLbIp9LgvJoDgEo2xuG/j1wZPh3oMUQhslCvSy/Qnwygj5gFPHXXz

5Q3GF/Fm1fPqU61pzay4c6j+/AW8HlvG2sSHU+8VxC
92TU5J5TjcXOMFlygFw+PPmv6vDPHXla8uDfoqmCxAxbOa4Vfo7HGcwBFY/Rtj1g8olKKRfZjDSupbye

VmLcDywWb8Cw40MAClE46K7kRiWR9VRpXHkV1z5m8H9+fbBjsazEkrn/2lflvRkwVD4CQ9/UruMA8WNR

ZHvPFLTgBB8181YghMMPmFjlfku5Jh8hvtjGlytLN2
Nzhg7balOQMu7Vgw0YGb6voyeqh61nEvYCGj3gw9Bds5CkuBjUom7323Hvkyvf4rZ+CKp7I0+R0h9mtx

Mo7AXSQN8QVeripM47M6nqevJlebnm7BNLMoyU2M+y++50gWxKj4GlMZMF3VasqtcizCVRq2KEawqLBH

MQ1XxJm0qxVo7wfIIgkhE5OKl2k1IS8RUXp7rBkJOQ
TinctkR+DAU2Tkz0RR03aF4O7cjT63OjsqDQgMd9HJl21m163+TXRrELRf61gBgg20lKLe7FiVHGmKYT

S470yFm7Q6MuSVvn1ejsk77mDP0pz3NxyFdgbXLkZ8DeUMiYBIaq6ZadBhR33yTgf2kmtfijUrQ84MkM

6wLaPnch9bd9riDiPjIjKQmSL7j4pZnbk/ph/gIKb5
Z8Gr8BYMO9jS9zIXDWWNgTbN4d4dmA48LfObHkE+WNu96wSampIpNGsZoANHQIeUii4rKxRp3TqyPcFj

xF/btTMtRyNe3QC0qGXDp8vAGUwwWRMgQsE/ZdF0KcO7PiZgxzsJxRzA0prdwxS7eBlTEnRoq3oVSnRR

y5yBIYFw15FcwNS7AwPloLGRDhUljhi/BChNTEivtZ
pMLBjOcuCQMGGQBKomYGAhCDnD5iKEIcIXaB9Wl+asJ5UqDs1pHP2wb3fvVD+h4fthjb47qAppBbq8Cu

XTOPqD1sgQmg+CihNEmniGU4eWGnVuag3Di8eeBhDtzjcoshGkcEqdbBjLlHmyRoe46ZPuBqR7DReoSo

f9xgrUg2KqUjyFFNrXi5PfZK9tdTrKwoz2Rs0sV0gZ
c2Do3fQXeti1in4Fs/R0ACZioJeU+tVjnEiWL8AWe9FtjmtPw/ou+92a8zkmqShUme8DkVSX4NJNoiuI

AXctmGNdxsPocAU55E27ACm/9yfysEEfJ/PEJs5b3L+7VVpb4ThPnJ2+3fRp15QVQBFFOCAi8MQJakdh

jcWLcHReE47eNrf21s0JJP9MFwlPK1n7y6FMcPwder
EdvFDI0zV79dApCC0Pm7mYmips8I+ArG1+h31gFfBhaMAC9xAVMPvJq8e3Rb3itELwuyeuiB9hL3u3en

slRZx4233paNFbr75XMCBw2DObPB3PDmcdwxVN4N/7feb2CruL3jolioqVBniXi7/3gUef9PoeYv4KLD

krj2knLCNkgXua42gCz7//li6eY0fHPiPfrkdyJf1X
CrOoiG0kisqGA+E8avEvuqCpsk0L6q2LYZ3MmuzKvUzP1c14aoQAMZoDXrRnviglNv0f7BlkNBVKLBOB

bSTQHy1M1dbq+fqDbDoAYAbbSiB56U3bXmfnQypsDCZTjL3VRe6e2QHU3lalVbo5mk6iDwJdFUMJgRQ3

ep3KqjPw4Hc6kAFNWWB2vrhkabZe4vqtLAd2ILbpnQ
pMcXTcnasy9KT7EKes+ZGJMN3kJtDHcziQbERW7VTUQkgLz4tMjLc/j8VYbIqx1ZW8JUCT2N9FD1T8GX

pKXgmMGwrzPclKQku6kOGveoWrONfTQoDuuhv3SpKWpoNdtSLQ+oBZM6EZjtifwsw/vabIGoGLew8WFb

QDMAg80pnVQhZ8cW3tJ7h4NmlcI82tH/RX8RqjMsPx
rE+FtrMu5nQK6vRqMyXlT21uHew+78qB70z6Fz++42bsHqvvI4T8H4O17Lf0PSom+2/JAcCReBm+fdkg

kLpat4JZp6JX+4W5uR9B0wIvXR+hEUMYpBBycbYX2jEty4rFdtRbUdntbDdJ8xuiVb4z6dhwB3jR8PJB

wJ9HWt4BZCbZwveeyDARzdXnIzDxPF5IJ7FUs/t1Lv
rBWs5C0mvxTqdBpZFhl1QpsqHfCsOdtwv1lL4qCCTSJoi9OwNKs4Jl0ouooEPK/JWzxPJWhIKjTDwJLc

ItbCOvKSScWqyI92U47KNqCE3mh8kf4swEuepVGR1bgFGMiRMauiSowgGzXt3dz+ci7sx+QqiaEg2lij

Tp/UIoFEHNFgQ+DeAHzXN8GXELBlngyzd50DhkWGAF
f3MIMYcjsD5IFfnQBkXJRUgY+Irx5TEGnJe+mfWQUMqkBFc3iT+pYfo0JsSWKZGnwh7Cj6BYjfRGC9LW

1egSP9UniCNCNL4k+hcN+w0IwOj3DWtcKeq1k9OoyX9FOlwUTHK+7HwS9rxzTfCboim665g33H9xnJCu

/na082H8MX/wnZi5fCa8h0hBCbw27Ixi+73nroTwXO
9fbN62cuDTogYK2pPc68xyZXGlbtNnlf1kGu3xAzWWUDviHx5oJLgJh3CPWFNFsZ/QUZhYgrmf0gUwfw

IGil3bVn8YPww74Ep9zcyahcNGrVNXJLMK5MR1kz5Nxvtmm0tURsZyYB9lFFWUANq9s29YmRvnGUB7MO

xaTxCvnykVv5zD2dbYemWmTXZ9LiLjTISACdZeFhPS
9Ieaa2ENhBXWNakXQ1twBjWQvkYqxIwN6feFo1jRQ46PJfb3M7K7kT4xnNtylg6jZF05cswQ7gI14yFB

ZRkMBLHILi3Lnsr5B1NlsEgMWsjA+LruZ7HmNzn5TaaY+pYum10p45fn7LgPfu7b28ODi0hyWrjj8XtJ

pLflr77NK96uiEeeDLJrTfBparOJon3nyAtajHLozM
5bUYUrLaMelhs47P/GBCB2+HrqtVgUN4vgseiFrMDoVlbr1+rYrQ+rvxhk05ZPmV0+FTQXy9MDc5t0l5

Bg+320e9G9ber2vcfGhoMKh1PZKWbcUbMNPXeVTJwrpx9Apa4skOOumIBUkGKwyF0qnGlYJBHLCErZjN

SC9XnL8sHCE8REgBOxAaxzfS1wDVJGYr5oGTmTeV0B
i08pk9Gp49DtoBJGoW0zDope6eKaTFxJLltYSzIDzsIKkXNhxH3Cxn4CvRM35nsBGkLuiL9Jvl0DQawQ

cULAGMArbqjXExctFHbuVifNLVo1Mh6xEESbF8DDB1D3EiTIwf50CcyXp5VdkRDpEq6NLsxDuo8qN6Gm

mc+5Kp9yExu23k/e9SzOHq9iqpE3aqQDIWutovdFUB
r3UxVHsNNiivGCP6pi1LW/4zPWtlHx+WQ4OCQdY/Dkz3QpHZh//P/br2Di+33H7dbX+/qtaSJne8RKC1

lAGTK6Qg+P9JtQjPq5frDKCxyRQ/Ctw1xyIj3+HGzkBPf4WChceaWW3qz1SsPUMw7gubUzm1Xi4BFz5L

bQm7g5TLOzOvnU6NQZhvVhmiuLvPtHkadiTtA3pbSC
jDlcP39p7e0LvcLzAMZspGC2lyxgqD+1Ma4CDhgv9JmNS++yaVoCfKIeRxJgYhCDWxx7vaBklP2Wno1R

xXO4Cli8IzWiZ2Rf6IIrTryQgJQbG9hRo1Okm46Y6yDrJTHsoPDfEcfaLReNurBZJQqxk850eTMIvAfS

azVBOvWbXpcXTtC7gVd74cT4DN58N3ruljYOoD9lsL
B9qgDgHY/AvJOl3ccoDHR+jYS6Q7psMwULjUbZAq6kcrZhiBKapuaOMC3VaHN0jHYo146AElK8zmmACz

OreEezV/EEZx3WHtL7CV8816A7Q9lydq/vFMLpw3rTOYZzzIP6VOy5HKRQf+69VruHYSWtnNnAqXydCH

wQptL7x5k5726SUO0d9vXG3KRwy/5+h72XE+U53cCi
9WfGP27rZJ1JqTzL1ZHJKcZd3J3GqLS1i8vv9jXlTF1AS1EkgBaAlOQ/WoPKh+s0HYN4W9ikMw7uARQL

JSrWWNjZpriumAvRpHKc8Ue3V/XqryfINzDWo77ioEiZv9D7U9UUavOFNL3L78KpUS8QtzZkfFi2UI9Y

e8BBD0AwL8bD32UZvT1o+UZRgvZzyRnWdDf3QR21Pg
DHl0LmMWKyqIEY3bBRnWPCEPBUm+ntUWNECgD8XJhaVrYohlAfcecL5IsFo2CfmN1hm2LX/i+2YbKqzY

yIN1Y0lHZSHQqnwWEtqPxOMOWN66ZSrDshka4JTwLIN2U8juPvenIWbiTy+8M4EcFikiHCEAKVeQAM7p

S0D6RPXGhqHOUAB5Ppdcczfr4hl5Uv1gPYDgw54VZp
hJeEabSxccEu/cYs2xDDIRWsfiPGqgqZZZmO3obUKNKP+7wYHztIO8k2x0BPdr7edSPLbqXlFXdeExzp

bRgGYGS4zhFCEq9J2BCEekCxfc6Cy5oVBZMMdmKcyyedxdwosDUkvzw1J5eKvFJ9vTl4w4PLJWzhmTEX

mowokgZkMZtXjDjeDTC2PNIjVPt8wxVpLh6f3KmagS
mFdR16QhzqBwRWNiqcKh1xFLBwPgUvoxR4vgQWYOWql/hnzAfOr41V/ZVuBSo23oVsCfWbxQ1K8ti7um

Pfl9rhjHjiBs22A/xa/i6ETJx6k5yJcqHTISZw9MHzI/HtCMkUX48YsYHHR5bduGXz2/zU+n5zPPXdLV

uNuRn6TzHacgMhMSxsvnX8t3FnvEoP4rWEqRh5I83/
17teC9heMDXuzcptEOG25EIDzo6CDiKzKE5QSYo/tEvEqPlu3mMVZ6SbEQY3Gn70DztBqVnUmYNkbOS/

WyEEc1KyERXOAN9gXLO5Lr8D3Ud6qeZsSzvOX5qPvEidlQtRpcSihtS2lrAs2QszljXzN77zN8SypmJb

ORRaOE0Ba+WkwFvDYrjCiMovMCP6TuJkKgX9fvUqGd
APf1L8gzSyuV49W921PJZQOIWLlhyG8CveZNe/sVe0sXNwGhXdaGPSJ+XZIRZu8REwtJRammEmZd9jX3

1Smt6QTn6EM2yaaZq3ELGbNwFmIUBalWguenVoD+XBWWizyMOhzyJezjg8jMGDPeh7v2wGQW/CC6zpsQ

nYqFeDvX3qrrEt4UYcRzp5MOdCMqmNMmebPy2YcALY
7y/1J9ZvZh3Bd0+xD0Nz1jS1tp5qyAOc5FrBD/EEKLd+UMl6MQwOz/ndtlPmqjjud7ZezizdKjrginm4

9DGhoaKC2cX57I6YYQvKHygAu5SxhxZZDASWnR3jGGVgHopahUYS2l6wKk9LgoVFh9QKSUZfFUT6WsYM

CJfBNGNHwXnfUB4tRUGyCN5tFekMjb0hTrkiSNUkfs
PB+0gwrSgdEZAZllfrQDboZ35ZgBA6dl7CruYE0+ohkIsvPGY7GjrgrlQk+fTeo0/XPvDY5RS3Nzcls3

B3IidXMfYqw6F7KClWpWYlDYQMvs1u0eLVJCPzTcIAG3oOh8LHN5XmOaWITCMw4d2UKS9/CCk0AXBbIb

oM8HhOzx0D96MJ8/Z0Ue2fwwy7RWz9kpDWRgh5oMlZ
zpqDmHvNL4XLzltp0S9aLre0fyjbLr2VeTTaVHfiBgMciiTNk2xAJVx3Qz4yVP7giejO8Gz1VkyPBZOX

VL9vaFJdOdFNJpFdFUhyWGDh7s5o9qmwtqzxKyw1k1fEumPcGw1MOhRkNF0hoQfifGMEWVaUVOrOmRxM

tkYeJXdbWeTlnjvYi1hwHJGpWsceADzYbH7MNaxndd
fIFyzxFNS+MNlq3EHEWpMk1J1aY6Keq+OIKev582SSR4w40MCJyW/4B1gBGoTZGBkTCrS3Nf5QrnQehX

PbTn/Iv9oaZ347Svio4r6VKNNH7khPRY0K1I4fMzTUuenSSWNoo3pA8uV3o/564FpiheUJ84lSKdEcCd

9DsBnJLpw7KM7idoDrjdSosMWF7h8dev5WnWR7si/q
Clyde/16f3V9Rsj9Eqe+J/zCvOKvwBQFs4SwQnbbUgRDG0JtuR6bssa0nO6cjMHLb+Vr4zlZST+6Kcrk/8

nwvc0TWZM4NOVMmCnumfCzagSdlLQInNcteqF7QTmM/ZTFK7ni+ec8n2++eeeOAJkObd+d/+XlNCMi+r

H9G2hVd2FpgKlcEC+Wkv4WAiy7Fm0xUYTcYlRsf6w3
DvlTrJvwlbw7xiwdR5g5d3BNTCj1ZiaCmW+lLXSPJiES8/CVPs4XGineZILyuYWufHWi3lojFbtxYrEk

EBjE5PlbYG/8TK3ut05jWR2ocCe6sMsEWOFH77hXjDrE2sP31M7/nVFpHzKaxcxcV4xW/C9PyyuFoXXS

lLObZdzEx9HPvBAR1+QC0bOn9wpUdCg72UbWg15wjL
U/8Ivo94fEuF+4hDpuAUfxPZ33hAfP6CUeE0JgsaURz5/isUHNx5jQfjCNIUvTO+wBJqglJW/H6pSOZO

LKyXkgk95lddlMcswC3d3gzk1veVy3zfEtA1qwfQeb2490QfDeWyszvEF1CSkaTOSm5831PqMQKSbRTx

ESCdPJz8scEhqjtC57calKP85pVFv7ljAgHOwH3SQF
92whqlYlMb8RslZ2bYaqRIlLCavg7tJ+zi4W4emeEgBsFUpZkyZJ4FlkEnS8LZJOYjuSd3Fq3fL9pjBH

/ET61uu+VCoaaLsPpdFU+9Lc6Mgueh1JViYeGZNUy6RgmPg5cJz3O1e5lnQpgOV8EHJxemntGuAl6kry

R5JpgtTUF/Um8585KWn1Whwi9on9Yt1km9r3CxnqYL
eNaJwZFZqHJRE1Gy08NnZYmdDiOBywJ8SAeqfnsD6bx1l5MjhuzZEibnEeAHp4JA12hYaVlcj2ZyQ7/5

JR321eVWhqH2h2GUkTMPqEcKYfkdFtyE7j/wo8QxN6qOyx0PlLBNcu/X1oY8iYL0G5aZlwLsmtCM0CvH

D/Zqftb1Td683R13x5M54D+aO8GExn+UsTIHYwaJr4
cvatDTvENmuNonqhe0w0JIrC7Tdr7pRH1HuKnK5PvbbDZmZmEgQc8vkjH5jNBHEqxiL7FKVZehP64par

WWPnB+LI1v3MCTDrcL8ux57+7t0uECnDdk70h0FHXGH4joMMAiChwKRmiNCZd0diuUUaMF7uv4mZwY1B

B5Xxhb+8NJ75YGcUUc2Sfh+UnIy0CgIMudwLuLxtDN
tlh360R7J1/auDIafxkCfxPfRa1RylxbqZ4DM7On1Ivs7UqNb9lAouaMx4gD7m8n+lLlkwCNmNEgkmhR

Jg0M7fvWtm0q7AFhh0ubKrAu0cl9hjIGJUI781TGYhy1/lyR9uChF4XcIxfcCOFm6pJAO+lDJAbwD0s4

jtI8Si+shCjuqDacnlNVUCj7aab1m6MnQH0q0LQBIR
3R4pHqAToglJyodh1XMEFYmZB/IdfzPNhYCql61qaMmEkFFE+XR6UKiyyTkeTCJzmA540RThRhlA2pUz

n+RWrGzmbTnHzaiYhxh3nYYyvHggLQpZ92nZ4EPAl8qu/la1Z9xgQdE0sqEhmiT8/6TKUMsUUf01/4u8

SOhT7bTY96PrLpS8ijZ7J817n0ysHCfzBg+got7WY5
EOt+1zkSQDazYFss1w5xHPiZB+4oOSI21b3v8V3BOataNE2aJjG5wfI6H1vs/uUPbaT71OyuvuY9OMve

aLWtL6O0zCKpDTmSC+hfYl24CwsdmjR2Dq3MthHrTE8noihC84S3XYm+GYG5EECat9hcmjZCotjLcFL3

eGeNvcQ4lWf5KqVM0mbscl64kWeDGxLpqMGALb/hKB
/n5lc08i061RrzyzMhIkuUbdvUG+5t4eyU4l+VYeFqePjUm2snGNdyj8Mdw91QcCTWcZNeDFXb9tL5VB

xiuD0Li8GO9lJ7HKlFLVMHPyd/Qxr9hOuqtyoBjxux7HNMM9wnuXQs1AYbRsrDFQa26G/T5xeUDoq1Nx

b0n8NDgEWbBn7NERI99N1ZGKhARGpGwoiSiNPgTEkH
aiQ76CzmSijcW+jlDSS0DGMMahr+WnTlGswNoEgyNJdKm3PWHGGAqqBoDFUmBMTeDANAh2uZkNqK5do0

G8Wfesy8Ta6qwg5BroLGAvZqPnzgQYSl15HxHNMRoVvDwlvWrsDC7yqASXa3VRdq9MtUenc/dz2nAwTz

phuEIGOw70ODzyFPUbdJ5jtIAKIwzhv7Ci7v/YpTHn
5ieqFmF6qe5Yv1ckUJiKtULxTuwGSv2ZdjqYDlv7Gnp/IuPRmM/XKTPluFSui0g0RF9ND6XsyiG393s8

AcwpQetrDhsuwfRU85H80YhZYTZp/1ZtZRbKZvPTtn3xP4KMWPqhY/KLkdpicw45ITm4kTZC4ipLR2GP

fjHndVL3v0WVIJkfxy3uaofkivRU/MtqDAWPQAQmLs
9KCyfURuoYYJcOGsCQaTQ9JW1xfMWe/awCSd3fwAw3a+/aoj+Tq8wrdhi8/LC4viSnLdORoLDSMM7Ax/

jazkYdykqlpLTvSXmMWc8KZFGLesdeG7rSOBFOoZDbFtGqc4yzenzm+DLBiwxLaLaMozW162OKfDIsnk

NTumUXb3hHBODp7/DnlT/6BGD6m4ZtjLXFzY8EqRli
Xcm8wr1NwYIBEUtMmpznHMWTys/oM4x4/el7FgWVA404RchzUmdZQLumRnG9WUCD0sDpoRnshJeAPHG6

CV8DJNU93yN/dVLVlmGesyfAiWNYh3ViA17yIncGw0kydN4uU8pMl0ts7uBPrXDYvnH2VRjhz6/wcv7x

3O9v/9gVbzEDkMYFNmrBa0w+8tsZXaYhRBjWqNVkkj
tYfWabHANJ9H8PdsJMY5cZpRIdNHm1240/37fn/CcT2dfC/ndd+FM4640m/21I05t09U66mdVIssSxM4

OPeQsU+/M312JMJkgLv7pP8UQUkMG1OvRDZP/DUH6SGW62nUh5bwCXkRuoI5VrrMA1zDv8eaExV/rrnB

N2/cBWMo9y0sZ9UltBeLtHa1N3Wg9sRYlEM7trWD9X
d4ghpzKf998avHZhufIg5rEI9jn6iqkuHBVk0S+/mZwolPTy2h13MNsWbWljtuiu/ENdDJwo5lwv4Qb5

5k+fAWKNwow6GD6uVJoVY4zgsGyIw2XDC6nrQCj54ab/wb89VPrimOXOvaVSxSsSZXrSiw0Y3QV7hcPy

fq65E3JVuC3BuSB2mgGQWZf1h+0CH1dDedSpoiGEb4
a08VpKn9hZr8Y/VZiyTtL0xyOwsxsCcwJoxQqh+c/x/vYr2N/tt9Da01+PHnkHXCOpX//Hcs+j98Rv5z

ChRrAe3Ypt/efI7ewNoowOR/5OO3TzV89wxaq8O16HVlz71DBvRNWpB9m98w/b3BpY0vD/O5CR0yrC0u

fZ0jXqI1AyslGck19K8yxP7vwiZYY4bPfktyE+8i2s
x4PTpY9lW5uiMoR7VyOH7NxXfpGkFt+tj6N0C3DvWoXaxcAgtl2qj8sWVXAYf7346n2MhQ7O8nJmKRSU

/K5xNKv9/fvUBJvdIm3QoapfeSBVnuVeSuyJiM6kh0lsW4QVW945YidBQvJctWLCnbgoast44X9bqbD7

3gvWYcvbZHE2qlWDvrOn3UJYwYXPeNhK2MIOv2Oip2
khAv3vV88c6Tt77X5lSFVbAHyYq4m0l8HBN0cuypI5BKdB/32liw2bcIemtXP/BEEkwPO7SLnfUO8PFU

ytbWXH/Krl4x+RJzXRYn8F1vohTXfb18BnMyPYMi5BZ2Oice/qfXGxyNGZNilktvAsvWIs3cGtm2Nq+W

JJE/7R3WkXbXiGr8V4c2wHgL/iayuF6CX9ZLOxh4Na
zbN3qXkm/dO4kXlNd42lZTlWC/SFLg6EmSPT7daCWPQG4F9A+msGVh9cu2GWL1eZoBYhn7HrLSLAjdR8

V8t+WRvjekja+7O0y5qXJ2wcPwXcZVejTRnSYjzSKaX31SfEyqwKI6ccxCRIAx8s3uMCVHwvMxht9O6O

OTThoHGjEzb4JIjH1852Cm4rMldq45gqnr1HsCdI/V
PxGuJr/fXo5mV3ExXWknbHDlFKpduxVqz3OQAb//+gzX7rXJ8imBVtbb4djcgb5HQRehBdBsJ+jXX+zJ

BjQ9K3epa2nwx6CVs65RSMehI0ktzTuC6fjKTiGcqBMBGClfuc4DcKxQpkKpKeztbIhsTS9EIBk+2nOb

9uMuES+WvlpOj5jSEe62EdiBqkD7biPyOEg4XinoTN
qe9c+UtV4ACfF4Y1DSgHtLYCyKoc+Qp4Vnn10gD4a+kLjAcpcTj66KvAYw+pnpuzS9RyvGCWscJ2DtW7

R2m0lL7FGy2wwUimi8AMTicYsqCpMkPy0bDDMftpMrfc4lyEbdvBjMRuLHNOd8EruUNz4EJG4GSukqPa

0ntf9BOz8/TJhm2DWE5jSJowzZPDA9FfLLwLIz6uyf
7J7gEhq18eIsvLVNuNtLW1P5dcai9Mmnf72ZhdsSNulQVvUkME93p3TLzuxJt0SyVI708NDyG9R7dNaF

ifXnIPP3KUADaDsEo5YuPzwGoRo3UQ2ydxesxc4LaAO9fweROBesnCRyKVpij3uG010/fUYJtfrJRohM

N1sz8+C6Mt1NlcsCOFQZ7bua43Iu0q/3S+uZfldwc9
uTw0LWJCM6n6ig8D8tb+ZTz/8HxpLRXb3U7SLjrZq/J3W400uAFoBFGoGRpj5RsFcBItcMHd00isLmec

Je7Y3+GP9c7IVWxFR5N7arK8Jq9f1a+bYCgErRI5cFTlNIMABQiK+hKe2CpMuktEfThmCE1qY4gWwOBZ

oqF98EvzNbCHA+qjxuHa8wtNaBWi5TJFGzi4NKwsgF
QBK5bvNAdvDJ5lY3J1WK+ZqdsqFeRnnwh1M4+zLUiMsAmG7tbIXEEKRPP/U1Rdj0aqrQX+4CFgvNUmL4

QQxv6u2fYmQTOTxqSJQQzpfuMOq2PIn5DnGMTBGLyBFI6oMVgSEPI89u3+poDRNeW8ek5izCwqIUA4mh

NDvTN1AyT3+gSShmgmbrlR4s5RJliaH3JljxDOU3WZ
uBoCzfOrfFX4OeVTWgVMgUHbC9lN9xE9mO6j/b7gKrmMTeELaTMlxJ8D7get6bAe4UEDrWIM2JxRYjxV

vFykNYwxK8zTWZja5OMYTDEDjSRIxBtw/9DVyf76YpIp4U0avTS2YiLIyPG4SPq195RCdbQzAwdoYq8/

zrSL1ryUTCIcDMkHll0EEAxsu/v1hfnNDgDs7nqkFg
/YtouXpgupdqAkZ2yHn1X/z1bt+1Q8W3d+cMPMM9/ns1k7tXIOI5Swjna/RYs9a1BvaCo+MdM1ZckB/c

uJwfvVFeL5G2ToCVxbhz15XIZnrk34clhl+Lk7C8llnILnkEe+/kvOHvc9F2eqA3Wbm5efoDK+PPXF/v

N0EKqKyhU1xk9Kb4/uIQHL0NWUD9gvqklmJ+3x3vze
K5chIG8d+TsF+cp48B8krNk88MCAL2w+BmAf6DWimHitQ26fZNp1g3gQCIH/QcZE5pZ7YKTnuYuSJvAM

8ZSunmv9X8TdDRjs8I9NWVEnWGXN0zlrmvWoh+1Y6XcqFssb2agJ8/pGxjqFK6Fb+M8H3bMH4JJqya5F

NtR5bcnSnLAg3bSif8tjvQaN9Y/NypFkt8KUanV44y
hlZMYIA5fyAYgu9FXvRcWPooAthUtuGmfa2S0DwDVzz2WlLFB7EpFGEgJm9NWZpW2ombrlyeVY+Oe85q

LOcJc1u4pvBaHt3WwrJeDnVBodnYti5gX6hIjDXvPg94t1SPbqgn1cm4BbfypLwwCv6EJDw+8wDq7TW9

2zdsfxlrUvjlQMYdZpmNHFyXPxHiNGteza7OSpZj7G
o+0/mgTIeAom8vD6zeKh+Popo8JRx4JlnKkAuV3Wcupr7xJFcaDYDF6TLe1zDQnV5Fx0m6TSYlXqnHsE

LquB+GrBKfxsGTPbZRehBpEKNZ7sZsy3RfPhWklol6PyCBnA83AF6kXkLHagdG2+QQpqdNcvSiU/vWBf

jSsOBjJNnWqewEPz0U5YT12AEJ8D4Zb4WrtS6F4K4C
ScZr4MrTLtXJ8IiI/XfPImSOF7as+4QwQHTNg/lTuhtxUmypCs6soNup/ed/MwA6UCf0Z93z6H6LaukT

978Bf/Vzypi+e+kP254MV7kyU/uw1mHW9RKWukbsYDKMfFgHqxXQWRYOheNnqaaDg/WHfUAdw8Q3beWt

yEO+ol/OZ5bv0rEJLydoFgVU6TIyTwrbfjjYyfG4/c
elz8uvz+h+lJnTAfh25R81SpHQR8/ekfyZNJgcy1EbcZsid6y8BL4NjRx9FW36qNnKK0qcAiq8PzLT9q

0D3HcP5nzELsUX94qctBs4qXUT/d5kWr3bMWKkWcCkbbA6d6e9aYqGmDULwEBBDxHMj8rgBivplKYJJI

JotFb9zrRjir3kYDd5KAivIIoOPbLB+JpxhQrnBman
jhNhJiIEHzV1/VndgRK8P4jjxdtntuXzYmJ6w3937NjobutYIBM4ZemPO0h896rwLguXFTcJHytJ+qgP

syW6wVp2NJnnTCDAqe+jQBC3MnbxVu95AF4FqtuXK5gkb865TpTzQIHnLZ9iF4snHFumhUGtzCj/J2+7

wyA9jfRbpPTd5pjSfPzSMwfiBlwlh1bz4K3luX/vrV
8XJ5KFXaq5Q9Yk0l5/1w2OVSrWdau5RZJ7lbfSUOV0HF8BMi8OqNBRf8teaCr9nLBt5KuOa80+19LMPA

YKxwo5Md9unFuAwW4ojC1avh04xvhNAI1WYfFCOjkd7a7+cxXv+2aabLCuzegofUq9MNPL7Mky7bhPRy

rN7hjA1qy018qW+t2g4VvkS5o4XnxxzGIrdZ03Co+8
jtnduOj/ikZTc62sUStddkdJXJ0sKXjpnrG/AxoKpG/QMPeoIPnBa0RPJ39QAdb9lhiaKVduU9Ns4CLX

OXqndQUpWzyu1q+Co46E4h0K5/F7U46Zqz4kLPCABy6wSB/xDnIgNOGQSK7v3oCulJ75LAajIh2ARh/J

fj8l0gmFQcGFYqwx1p2mh3lVAFzRsAKNhRY8ShXx2h
2ZuwWcTSXSOu1uhrulVcgQK41z/TXTBNh02hbtb1D5YjWehWSxl/EPvGo8V26oBdnZr8pH9KmlB8YXun

s9CJQr01rQPn8DohO7Lso18Oi6ZytJaxhvXK63l0p1Zpe4nMNMmm3Q8yK2QRZlQLx7S3LjH3DI2fPDbS

q1bJ93KrA/bSL6DhW8JQi+RCcAGLRgCmAN+khziWHH
HO4R9TXd0DwVrrplZr6FvYlAhuUhW5ksw3htIZZ+2R9skQlIdH5JMgu3/CHCIQDyYyYnZpOfKKkNLmTi

nGMYBhyQDkjNomHBBxuVYFlbXUNLryJH4ZMUHdbRYOYxtuxNVAAOFJQdXAIGvBv7g7YTo8O0XUc6VmLL

/igVspTqZriJaZwojbtlTFcJ/BNykvQxGYVJezda6K
lEaW832dbHBn7S+q9236Yv7wQubeDSLSO4WsgVm+ydPKHTFQwm/0EWKLJNnVE8anKgcYS1fo/1fGEPeo

YcIcnWxkTcEtC4vp9ehXJk5hV0eE2W3rcsuebrCwlwaVEc+FH5plOpBzpLKTAKBL2ZcVYYdh48/k27m7

apYXRvfRsEydGDV/GBYdWqO8m/wvD3uQh5CDZomyaI
dVQai3H8MPYkoCUM9fjFS/TJhDauZ4PFgUgGcuwUuKqlADeW9U3QAYMlDSXw576GdghCOCJqakjd+sg6

k2TFWQHDEjrVlI3IwJr+KVEehu9MAm831nlBtilPJNPYRNxmOreHHzNMfdUofL0ewoK5eU+1rCC1J3cu

dyBF4+OXT52hrPlRmO6FFDPFB8ZG0WOoCWQwUU6xRQ
fdCIpxh6ItrnEnJ8XnXeUGdZnaoghVMCbwEL3Da501PHKNm40qSAs6McBvyihz5kEyDTYmCd2Ic9aHFN

ruIPae0g7XZPbX764fnDV2HxrEpO0HF/sZyP4K2road9msfxggytMwjI2lVwGvP4avwGpNHdGgf/8p6A

lw+lGViHRTJi8VQnKpFwsww8gwN8Wes9l4uXW31QHH
k5Ry4ka9pPtFb2mi88fh/y+utXpM4Cqe876pe+A3t/9tvcfMdY5Gmy1D5oo95zN7+Ge69wLXj25OlvSo

sl9+RSlTZXmM9/Q0/RVnY5MiowjIPx+B2364CTg8xHGoYE/7wDq4dZPtedgDDfHj7qyNj8zyNIkkFfbd

Tpmolyrmac6Y2oed16AsPbw98FT9rtd3Poly1h/a5w
VVOVyYHN6Gt3+ggJ9xGrhTZLc2A2ItR8iAkyvKqDh25vnbaS8Hqidg58BiZE0FXU33zOIpuIgVnd6TEe

1Ic4wSz7FfpnC560kWlauMOK/C5sVbAEpsL4VxRjGNB5ZM/mDHOdXvK+UJGGiqlXkMeiRnIy5Ycpvfmo

0xjSOH+9iRs2wkoBqsln0qOROLrMzYn3Y5M1bLT+fY
gWTozJyfvTP8c64PsfqKz9h1ZLDMs+zS8wJtqG0PxvgXy35OKYWvc1EqJSbgrrR2KZIOyQD9Pm/cP13r

pW+o3XgWcHivqy/38VjW7zc9tCqweHjJFkfFSixJr8LIRoeXaJHV0sgDr4BMNI2PXk8DD9Z2mMFZ188/

GZepuyqk/kF1701E5AKd6IuJJ2ozhHKuZIMBSTUxz8
N0FSbUA2TRluKG7nO0v2TRrN7z5Q4bbYKh79OnO5Fd9mlL6lhjevfpC43lE+/iM7mgiPw6INXF5mfjx6

TwJBfLmh8Xlt7HsHMGlsxBG31PuHSacsSr8cZSRSX3AorfqXDvVoFJWqP8zWptyKIu5KtEHW6hICu9GL

QUEtM0IwxndPgQxW/OVgQX3Rcv3mIsWpXVC6dy4qaW
k3o78ts0JFUrbtZMVXoKu6vYrHiWvLrgtURwqyT6VjsrFREBhrXqhk/8WApeHYBGTCpJi+HGo6EBsCit

rcFGE7Aggb8Wi77+E6nyhfUhj12S/JK/7fo82W50Jd45D9oyQf6Rjea21VaC0axGCb6P5nk+GsutFvK/

9O7NA3YxwhPv/za7+500U/Z5LGGmvcU88xLSd1eKaf
CnNZA/NWVokYSW3sk6mAQETl9FkdJ8dDTxonFarlDbNyi41oaIr0YzyGn8iPnFRVxFcF+NBm9us7kfw5

YUO9F6Udzo7sG3Q8QBes7kXX1BljyXmM1QqGUuq93y2LvdwOGAdFsspVGlJ+rjg9lnHhvxANEknaz03K

TZX4b1LHoEMvF/qRFKH3kqtwNN4+GaBqa/OdtxQU6h
dhhUA+JOUwzogvRBuyuXUcedycmFbgSW7uA2+b5hLnVvhj2AmOQAAbzlF2NRsFxfviLccJ8lORo7GXny

S8vpdAkESbxoB8o5rvEABoJE5x0YQabUrmm53QAxQ+4jJeFtWtlxhCnABIfJVNMK+wPrLlgegY/3CgyX

f709wog+4zCDyGL2kvLhXneD7gGBFucTBrX7v6azP3
JfuzEkk6aZQg9ncyUrAkyHtx8KgHH1SbJSuGHzpGxHykLoi7pblI9/fhjg2rEmVp6YCbmNIaQXtDaIBo

p5cjBu6+xEtUTyrfLixOsfxf+54PSW4gLG6kx8doHyhB4aq231jdEaPpljOwn0kPtbECayGjg9CWcrpL

Gts+DmrC4OfF8RAQiZjpxSAseEZegR6MBLmHg3Yhyr
tcv0m3TKd0cUDNifBhnuEZLKulJyQ1SPV0urAH1pk4I0l3n3pCdNepmojhgPYF6CWb5v7DStyydtgurb

gaME0N2C9+OFoehvekTPWiaSpN6fdx5KZxXvtk9Mzlz7Dn7iTtkUi40kOu8xywlabjI8PG9lpg1Eixwe

yq5JUTBtGsn/iEaY4jgsEHRlVngxoXRB3S5p+A84Lu
3Uw2kJA/vM9zufE+8JQZJJohb6XFuexIRdx6Jw7b1ofvzlNQ9V2U59GgVbwx1e4u2KMToylp0CO3WY2g

45mgLAQbinQSaqICWBsCdDT7oro6k51H8KUWJBJCJIg/WhRCkZFJHHRKQn8EDaVUvb5XlDWXLnqv7UIq

4uIe37N+FeFCi7cpy4nWQ3UsR1San1jkx9H/gCEPEb
wHVtU5+sk/JS+ebLt64/rvzfCk/ru5Rndqimp4mO6n2Xh3e1YzNpBVlPmOQEBXLLzCjbdGXGi7wpU/CD

4X7EhlgfXa5dBObgC+Mf42La07VIZyXmCMyfGARdXDzB6taS+5zTYzgyp6++3rWqvf84eiyNbdhjwj41

iK+U2vnwyKsamAaugUENhmn0hbdw/Nk4seNcJQlubn
j0pA6r9UO49WirOwTYh2PHTGb+jQnwN9TOMprtXs+7kfG0Uglry8HB4oI8MnDsxPFiUjERtAPzp65nAW

kfudfn/Ea52Gq28Lqtm+PEvTkywnxAtPgqODrhnqPMAwwysdrdpcqcQiniwlMEylRs57tL7+8cxpWHhR

p9fTW0Y0qlGYtE8YTPJsX0KGxC2so7iGv+pEOKjWtN
tvNdNMZwodQGIvlNIu17tXStY5n9PUod+lbNzE49aLvD3w9AdST5WpawlzwRmv32GckKJVmA9FY/wYde

pWAGEDXGRbDfyCEXLmRlr+TV9sFP4B5CC6mZ5FiUC6tatpcOnhnZpanZs38BH8AnxN1UXGtFXCv4fwlS

527OZH9BTViZBSgTKEx/jJhZ/d0og4IUnU8oA+d/cI
PKF5a/1JfO/pVtZtf8UXe7/JlTxK1opxRBsOGk26+EBit9xebb4wg//AD+TQCOVF2aM4id90+Tk0U8a9

5SJUSUqUjqxPSc0bLA5z+7n/imVyi2I1ossH6c7kbDiqTJIrimlMt7hNOv3hhIbikSitA2rq2p1oE802

FnEBYoUe4QQkPXF+4JIMBTBnBaUTyIzKwf9H7XjQQn
Q5EwsJgTLgc37OTJLccqJ81Vy60MSHRCsSKhZ70hzvsHBU7mqnSrPW1vEihFO4xvTCHlBTrL11x24+QJ

bqZMYcNdNbBt0ixUyUivDUPEHm0zVsGrZotxI8+GH93r9DVs0TiLDTqYQv0d4YmPWKkgW2owegSWfZoT

A4Vs8qV9oEcHVPm4qjtrJTe/mp5yeEFEeAJpcR2nXO
4ea+TP5v43XQeYN2k8BQk+KpeDlq6Rfn/n46Dv962QSJZLcyBx/lCSEnnZYDGHEAEk340cQyPInZ1/wm

BD9zdJloJLxiMrsYVLvlDQDKg39eRKeg4I3a14JxnjfdRCkg6eXwW2/hwEQ0TRpGxRAZbiZXn2k1DPpi

T/ijlCh0PWdC/AOYLCIKVuKFTMQNFwWNO/Zc23Mqg6
7jZV3QHWm2MpbcVQSSFPcnylnu6loqvsJr6HLMEcl3XSA1SBTy1JTFsmbdqQaFKWpalh41SMnBqGb9xs

80KtdrOG9QFJ0Ovq6fODiW2Pfyo9gYn3SR4etDkNPrTkdYgLa/u4T0/1QyC9ij2jQvNQnpb1k1Vlo5pq

x0cGB/qD7Q2KdHfvHfBdOpLxkr/c1SrmDcfYZx+AT4
600rMHjWvsKTisXuHmVWdrSgBaVf3YvB9i1j9QO4/+8L3omOkc75cs7D+Wzsvv3Wj6IP+ZS4IoTx/4Ny

vhNraDS9NUs8Q6zlcW3Uc+A27/Bog/e0xlkDYUqvDs7do7Bxh5KWnBSg7fB6owQ7weadK2QdMEerm4qN

A/P5Y+MilvxYvJp2L+nvtazYls22O8V6q3Gav0YQTe
qZX44MEPinhMFQ6RdVXVD7hlOd3V+0Het5F+GM43GVX53TvhET3pK3WNakffUq79CuIaPg5N2bWbyjCl

K6GTjzqFzaJxTQVUhjA7Zcon20fmb2kpgfYYDtFKv+sgJcbPENLMLcEr+BC4XGjSAeibCr+od+Bqd6FH

uoVd7XG9RXj1KWHLPUb4tU73a2VFaVcihymHlmTw3l
CWGNiWN57uu92zQwF2ln2LwLBv8Ogc3xhh0/pcoKxTm1DMYo8sVS/N5V2WGz3vmf4WiseC9Qsr4hyMcI

+JxJqxQoJM9W0K0PTmGnBiPsCtHm4wf+0HKJNK/ScX0L8+5Vskr0flhXfDGEtXzcvRq0rvnCk4wbtC4N

YB0AZQQeknjtFZdR01pB8wQoTwwHx5pm27NQpzYYq2
ytebNG/Iq7WjhJvXTteeAkuCs/OHinb844FNtNX+f4sI1aXVguU0J1uokn07OkW0jR6aWeaq7zMKy8oR

irZQfAzKGp0x6wx5Oh8EUimWdDXfxLVe1i7OI2XJv3+m6GYx/6OK/V7m/at/JDGXco3o/K04yi8XpULn

9z2jkhhwuGPu3/29uzMDZ+9qGNCLkHP6nOR1g6vtwi
28lcvOG9EH5LkwZvcg7aBcaJ3iziNr5ikyKvn0vmtK0NVGHX2Xme5TIR7FbJPkXRNjymsVFKWCOYlAR5

JiyTZSL44DjvX8msj3pyd+AFhJ7FpEYnbV3WYDmKRSxW7RdtQ3CJ1w/Xw+LYwp9ik6ScR8kUB0kkPkiO

VoZ+BqmTiYB7fMR7Y3yBmVeArib7K44KmvTNjqqtBU
Ztwfik3pXL5KSXG2WCKdIJEbipv+c6ihOQZu8xgezt4HMwaFZhljJJbsIIgALjcL4D2OZq1g259GRvJ2

kYW7DIfpr4ZQAXx4bLZjxthDPrIvFG2AD7JVpnNVt390EJj/uqiBqmrRbazQtyTOpT7XI1APEv326BBh

eigWiSJAQjYSVG6H6PkJyOgflwxdHrgocMy8CLp3mx
/mC8/8U6yDE/voJz8M1VeEBRFdj5d/KH2kjscpPK+Lv7h6vk3ByjLF1WSZMCthIWZQS83ZR5/6nTes9e

Bzs/BZlh9CV1c9+HF4LspkAlbNqoNr1ZjI7rMyAfCr+Jfc6VLMfS99T3tQf4T1ZJ8AX9ifyp6l8c7sJ+

fRjm+bgmtCXIRoG3pTGS92Wk37rGZRmqaC6giYNnC4
YcPYONa+bPuna7wvTxh+iiZ8Sn6LgpnPfcBlCU2QNUyM0OFyt4DOvFlVFvqISQvhfosKqGwE0WwQ1Gn7

oBZ7S0BpgljG73EGBdorN+u/406pkpNXp+Z7u0H8oo5F/QJkIdxNUgkf98aj/puomSsALwzKqsyeKbsF

wiDCB1OPMUQQJfNYMYmBTEdGH78j/z4wqoID69AHJI
7zhONQ9BRtfiIzRsi/FghkLLG0AXli35DTe1lgNTADjAY4rpffWrV1486lIKfBtZII6l0yLcS8JlNQ3L

3m/RkQZ9wBwCHRKFmBBcfDL8P5glV0l3B3QurtJ6lqwtDSd7LCyvyb/Hbl5Eb2ssv9Bh9s4QAfU924eZ

SgIDPEOAWinPftl8J/GKNNCP/6Jwx8+y9SUM0YJ7b8
05/HhlBxIYaBhH2EcQcEjxVllaNxlD9z22ZzIq39xT12cmK9UVo9kuhO1Un8ENH2BLdW+TqiyEdLMd7Z

3NH0rh36A461lUIyHZma+P0fhKmgns96dj8Hb1ulgtBXNwBovbnAuYnvo77H82PqVz5ZPxEKExE8nM1r

zDuYvqCtvwr8DNQuolG6aw/NDtbF89Ta2vsLYgiH75
KDaaeZ54pRbe9lelo2aHaZE3DiALZADSQHw53NsXjAKLOlLdfM526spU61VTh968Qh23viZW2E+Ovej8

sap5qpXqbQeFd8rSkQ1BPINGyBGG1x2WW3XCg6a/0VomKfiuPtzXZcwRwrf4VYRah1q4TPXZqEeOA3HP

26H6nMFzcXhouV2Gao4f33SfUTI25nUmgfgwcIHWJm
U9BEQ1Znf703XwA+3XAWzWkvtwC3n1Eozq/ChGSlhx5ovVEnWJREbxTD4tbDmWGWUjXHpVc7F8EopR30

K+7xifL0WTTDvegcZEC3/AT9/eXwuSNz74vrT7CLYMyZAeNmReoFF1bGdniJcVB/yBtfj3FqnbnZljZZ

3smAMNbjdbkt1/1KtyAVq93F3YsxwdDppE7xWcNpRf
YInr/arRemtmO/JHt8kYfpuGDcfL0zuU7gppZrsIjylTO1D/IzHXeImeYD8WBJDv/l+bfhQsJUmLKz+5

nHwrlAs/2KTfsv3xqsPjHvp1g4rJC+Tu4DodV2jzAKEYewjxFsuqghk7gm3eFGVX0Kybbf879sqkSG2a

XZpIhuONpz49JJ3N/RsMpxPlL9uWpjGdMEVVsg+d5T
5pmdBO3qVRt8C5JC0i/QsfpS+MUSgx40Kny+3k5gcYkh/E2fEOoZAK/+qLWc9AeSXKz2c/vDH+xoz7yz

A9Le0erF+Box/ixpbFdb1mTTMZSsKKSc62u5n0X7z6X5cByHAqW7Wd8x9zirmSjEcMDsKc6ugg9lGegm

TjVjN1ttgkTgTbYo+EM5spXcN1Aejptqwzglm7QOba
Aa4OQ9eeeXJWEprXG5B/CHCciDwUHN3c73aaBoQicQwwrNQzKeLD5pvXJnizAr+BCEnEP5bqsU/VoYSk

qavgU7O+U0ZUl4786kGunC7J8UNkHNOjuLNKiAICIeU+33bEsa2V9F1bJgLEEPe8u2z/FI2etCqB+Oft

VD+k88NK7lYjQjP9W0X22ZRz+vY4SOTJi1mUyBXl7O
bFRbwGyL2ULA5+LpzSrlCB0CBMaNLiKhB3089Qdgn/dL1ivfoousIxGEdnmQm7zwaP2aKQ0qqnx/CIq5

MStuZiccqgdbLRxW6uZKbFDacMnlxqb+CaUML28+e5YBeBUk0p4iv8A/BG0YrEQjy6OLdISjJgWWyPBY

3ohU6UlB6GOKDNFyXdA1+DZUQDI2pXcVC9NrlzME2a
AOpDao3Y6Eiy0jefHzg7cR1wqZzjxGQTx2PW9SHzBXOqYr0Fu7KqROuL7IM1nHqZd/Btxk5pxH3GHiil

qu9ecjduqrYD7e/qZsn0wr5R7tLsWm8QR1NfXx5EZRKDjb2scItkih94B+1RlbRFHUwip9TkarFzOAJJ

IKiHL6fLKAdRlGUpYGAkKxhE9vfsvNvgjQ5lxmRJW8
NOklmL2EAXRMuXdbKr9jvfIpDrOFTEgNEYn0n83+d/7A6qJbLGo8K0qRWFPvLbkCwWEhnjwo+1mrGQ7J

V4Xi9hHaEjgzcA/DFWLnIhdVfSmeLi4gLQ2zKsZYzUfsO3ZcrAjd9zOwfqRx4isliq88TbHuamDg4yFQ

G8VhFFyBXt7v5LSj3NiW8E5IhIKWIVoH80QPfMMylI
XDc4fiy2txMVnkE5A7UbnT+tuiRGj+gic1MPskOdjHLciLJiWz2d2q9rolhOZ50ikappuGMoSc1m/ldZ

n+I0unpRSQ5weGp2Y7jr0t/yI1nFXQeoilZCfV1XfrSZab42+yCL6yyHk0ouo+FNOF1w2hF352Kfxmo0

tn7xv73KDs8lAWy56lsmUT/vpGhlsbRdMstu51q/fp
IXSzjxaqY3gPo/zU9ZiciMKNplLhvCdG+Go9L5QlBGQFiFwWK+8VAHyqwLXZzS+AXoZXflA1DPjbmTh2

ieFKdaJ5lpegN0V9p5ggNI/tL5qumk4pKmGI2eBwRl4ie9jPqEExYOsVaimalcjCX3can/gkA9MoydXm

uvKQ+TMnN50MZ1+14y+3GOCa8DxX2EIpOyRM3Bn5Gw
n5kVN96z0tiyvPwv73Gxn4+h8g3EK1ZCjUGvoy/iViinUx7VO6TCvxSU1rAqoidgYFyvGo2eX2DNz4Md

QzlsfR5sOQlVSyPV7xtJLHiS4DTE7bAaOZKcGBizfIjVScs8ZpSFsqxY13tiZt3ZjrThhuR4vhHZBe3b

sqGa+Ymjk48KYwauevjdCvhsqOap8dcmEZoKJanyfJ
vgKFnbQbR13qDlSzb1O2xJ1Xr7O+b7Pv8k+EFywyprOQOqlsyaO0QKVBONmpC9WhE/hMrYz3s+2K9Haw

1JtNK2F0SrE1l3LcmfBJ3g79kkmI2qROCdnTvfE5JEMohQ9qLf22YXDf89SFsw3C/1PIzj7UVhc2CwJ5

xA7N8p+amiofcjG7Ig3esovcwOaoJ5mRF+5XYbs/OZ
t0E6RWYLG4P710r3uv3H+bc0cEkZVLu2g5Kg6x1zNdbIhkOgKTYNo11bqN0dRSP75iYW4pQxZWYpSQDD

cDSnB3QtopAKyjNN1SGp63sClumf/jwJBePC/BEC3IlM3R4/uHfRKRpGvNJ3zyh7N5uGp2/Prince/ulU+U

dh+eUlVm/Edward/g+QzMIhjbRgchvwNoJmh9fE/jWpkOr
sB1QGrMfH4uZRq7W19HRqI2T8AJtmcHGfHkXgySYd1hcW41iCyD5TSHmJrZMALGDo9ym0Gm5PMVUNYHB

iLugn+AwiXMeXgpC4wNhabT/ltz+o+xOH3nrJx6Zy49ykDQXpVMxnG3igMzXy3M3JBK6pt6ZytHMk0E7

S8A/zeEfFDKz7ReH0HaNEXdyrdNZ7kk7+6o3WsqjgQ
r4n0Tbycxu6e2+WuTLUF+SlcwmM+L7ysjGSaejnVv1iqgeF8AOVdoJrq3pY41d7MuKxRmBlnGDHidROs

YYn+8lQ+c3JySc/L7QYfkpOJ2Lfki4U/S7CjPEJUIyDcJwnZ/BvroMhT9ZF+rOdIWWEQssQuGzOk3Tqr

koS5yxmxmp8Ytrnqug7X78ZQmz6XCJ9CdF3ydqsh+6
0Ro6vaVDM9iJO+rryI9CJBPJgh1eHjjyFBEVCVpGtnIaGllrI6blIh6gQaWJYVGA4w/yBSxej6Lcv2fi

OObpZGyb9eenx4Pmr49++Qey+632Y2YbEwXlj+16R6E99632X+Lr29ee/baR3yMo41mEs709jvY6/7Ut

vHb16LTbA5fe/0LQlL8SU//myuj4xH2sY+GWvbzQ4i
DgMVYUz/wRq3Mm0mmnYd5cGxKwVXURO336u6rmDO7W2hgK8/4IwTTnb01L7GPfCxChYnLzMVsi+zIPV1

mIDAcLGFNLzhIZDHU/P0JUUbLR6/xeA8LtaR60G02HT6xjiNtJTjnFPw+V/dMw0H5gI9Io4SIG1jXPy1

1PwE9KX+TDluK2iedfkzTbicMJePcyKkxtQEEv6xDR
3w11vt7Pj4Y7JjPVt+EtLzIviiX7dhSRLIvlzkLwprAHVbHEEz0gMxFCCgBGxX3JeKHbmKXhRiiGvru+

KVRnlb8QhpD84PlxEjwtwgbct/kIwURNtn/DjIjv8bjoSlqVtLPnoPDGOiVSyQUKSbZjUkTTfa2l8PTW

ag7gkdif9maYJedVzVYNmfxKqaqqRatjLgJ35uoKmy
UxOxAiXN7Kvq/hwuIoMcvuX6g4mxUz2pZh7rC6BdXB71iFJpTokTuTA1uoSoAOPy8/IyI//5ahb5YuNh

bpH5xykb4fovG0yS/f1UY74qHNPgQQoXSQODHN1mIk1jyK2ER6QCLeWE07crvEXe2D444MZsORdbfbwY

wygk6OmRhYyuptp3f/4Kt/A5xh/DOg3OX/HpZOku0v
4Bacl8Zdw5pWY/TBbBj7siZGcXiNjzXlalsGaLiqBq9piY2GShLcpM8CV9wq3RlsQ0iIJfhIvfQDz5/0

0gReSLWqEui6kleyKRHDJBXHel445YSeYdKrYmyyNyYHPfIkROIbpOtve4WdOckE9ntu6fz/qQHqvcUg

fw7s2L+2mopuBmjRBNdQyJOWKHBdLjznMRssBGyL0O
e7BCpSiWXqnJG0S+m41TcxUOKVrFlSasclFQs+djUIeiCce3i2+ucPS0Vzi15VOAiCFyhJmi47as8V2a

rZeD73UaDMJhp5SbehtXSLVvoQ6oV0+uNJoJ4euNJUEOWwEZiQoq+6D3vm4Y1Oj8LXTDJ8uMA8ETVKNh

dmSB5wSg/7djXFXi3BlnpXPEKg3cbvgsBd5emuTiVj
8Nf+ixNXfB5B2/88YIlXfyW+OgTueuCoUV+f/Xfpgf07Xq0r/6L1UevsbAR50A/tNlA9S4+3Deu3xPqn

5kVfM3r90J4qq/vS8+QTlDx1ZklSPn/S7wcUcNGqr/oizrDcbJhwOn0m+QJKmrqAKuvTNmYVwXtdPEKY

4wZJM41v03e+utZn0KSPQaObUIPBOhlNUwO9Qv0uTt
b7vDGG6LNdfhUgj/AqdsQ4XLLZczuXPoRgqgizAwFigfRIf0eACtvveNopZ40soTxEGNJGjpuS5TN6/X

FITfnOQxr2onj6+XcAj8EjNaEl4yegBlWk2IFDjblo1SUwvf7wNTm50GQiH23aR/pPtTvhHJce6Heuex

cqLZgYRZdqNEaiHHHLUUxCU1qp0/rHGXbtHdG0S4RT
R737kNx4bvFD9rq+A566wRv2Un229LXAtoB60S397NhN7KBuYIEYaL9vDIzTSVICTpqzqj2Bq/zGC/YW

G9d+fyILNAi2VFCBPzWm/IZJR5FnMP7VRQeo1IfV1bhr1gvhG8Gg84ru8tyVLJcwvbwhRu1WP8uxJ+iq

VygflWfzI0oEz/TFZFAaJqR4JvweZwepPrk0tYgVa0
2xSTHmAGkt6AUvaXnUdkAU62kXlt2SX3LjPRZaAsLoMpbSvvSCnX8LmEYjtG3Gvj7c+gb+vxYyyIP/ra

d67a+k1zZNGJk1mk2jqKEHeFNUIvbkYTNdb23fovMKWl1u86lcdbPfRu2K8/XoBE0H7Z/wwGVmbIcTY1

yhH4Mo80XYh++PC3nNcxKqccLTX6cRbOyEQPBVKE5/
baS67+34ZqRBmGJRU1DC4P+cChq0N3pt9uwrufiTajbj8WmxWP8Sw6scxMv6TK7vk6lZEExnJ9np2YAT

qNl5vW3qa5FspkIX4U3bjE+eWAiyW2FIQK1PY+ZqpOp7hUSuWPZDp3d1WrNQXz2jppN1f+f4aMcXdg/c

FD3GLhz3QnzfMaJSOUOOSBrU78QE4zLLsn52foY1sp
nj2jIbzsGujq6AtvqOY3mFtKVKwjAwQTtGM78Xv2HE0m0uFoFxSb6vY5a+a7C1D6iRGgb90kOFEy0PTO

2EhtlJpfHE5IdSRM4jwJmfHipAo/nPLJ+tT7jcWz2U1zJV+ro0T5NhKanUgd81AzvLiUsD4bbK1AP88R

4MNIrTIVMuNvPOYf0avxWESGNpJnmJc3V21QhfjQdM
77P0g3vQyHNExVPpQNWexGbzm0exzBqRPrZ9n0K+eRLTJ8iXirdxAptqct3L3v2T5rO0SzxmyZIJHrVE

9oENOxJXcCilYqmSbt+ulNEQj6hD9aKLL36OtkNvLmzY9IeqW2YbxZ8gEOqh26b7Gil28VwN6I7prHWW

ONDerTU5As6EqXBZOH3rQzflaZk1a2XOJGyiaXODv6
1pJvpDkwobrjTUma2UdZ3psFR/O3eMOB2PUrNqPRnh+H81Li7+xbjYZ/w/a1z8U+IKmgjpH9m/7CcZkd

Tm5HayZm37VkHRAw6FXEvp8+4IS2nNihCJUwnEnsLwws7l2yb870Yqasm7CuBT23cUaMMeQWAr6mTNZ6

0lot8cdapCtRuX6+KUzV1u05OVpcsZ6afQ3DqU2Rbj
U4+s1FVk2nfppNmXHzYiE2+gTxbJync59bQ9HuYOMXcMAUzhCkSgGZNzaaRxnF7gjAkzC4U4X4oL93tq

AL6+wUP9r21zh20VanerC0pxRdZGuoUIR5v2NNmZY5Hm0slaPeC3zSh20vfj//L65jZS3pxQpoZiWvix

WcjM5j3cyFPpxcuXc8X96jQEXtsFEFVyyab2ubfO6z
yooSZjIHJ/n0VvbGwicf1gOmt26ewMM6vWhb2uq2Wa4MXXeKLN+xndaxBm/NrZk1v5X2XqvGAKXVEkf+

bL8AdcHpZ7mctbCCPLZXOIJikjibxNz7WcqAVxpoLpKuJ6uJy8pmnpYIvnVMtRjWKeQYe05t7uoCNW8G

BXlpbjrOyUdN7ZfH1Lh2XjQAhZmIyA7mDjEGfPMUKC
aPl/jCbkvJj6a+oRLHVInHTqR5lZkwqGRF0bf4xwofpi2gqMq8Ij8lBIXNJPge8GGpvBOa18da8otBSC

csDkt7m+CvjLzqXkGTO1p5BCOJJu/RsOHxSx8JDYHqM78GqP/USSbZscXBmybKD9OLjoILh/iE2LOgzY

3AQ5+k1UPeMA1i0VTlTt/qqweB07pW6ZoX7vqGHNQn
KNBj9T2+L3FLEb9hny4yk/WqxmHNvGAoRpjevjMTBgxpPgPyBPNNh2iLTFka4f0UzjF91QWuIpwofpxq

FmQcwTwaUjWlBmJTnKZ6Wt/gn329rBFUTh3uAvQ2lRgkehMtjZTIYZmb3V7gZruV4BFOrcPTKQ4q17xY

ge7KhiHyKvPTU627Maf9FDTHvcaX7WVcW018NoF0yq
WrVF47eGy5z8NGNjzLjJWr2/J4vXJP+89W+b1/50sDxvpEvVDHJANY0m4fSWakcRr2VmLwWcDTjofRHI

Si705b0XrtyRM+/LcfasqzyeyE3fTC9LH3EIePOi/OWJjsNRUyjQaupoDVLwmNxoVS1fmIxWG9ULouG9

GB9NCO2k06VLE2hIcKEgrM/Br+3STzhQ2UXXqQJTiW
oSm8dtwjXw2uQxmX6t3OMM14tiwHdGCVh2qiu7Z5JjbQkDZf7kVHQbGwMd5aAkiDQ8WfeMYmTzsOOB2s

XvmFGIc8YGmaQ2u3oxm0udz0MVhrvBFVkCl4RCVQwkW8l2pGbqUEpLPu/KKrRRKaqvbl+QrEq1m8k28y

U3CZknQ3SvoTZpEWFl8lD3nsmhaVtH2YXqAqUIvVyf
G9Y3pEQQXWEeIIIAsqOCo9Y97pd8NSLYYO6I67yP8gGx3MlzdQX16oA8t6hgQWSsQUASmphUSNw8kB7f

dWf49Sj1a2uMxqvv58248p3jIbnP01pNzS+oe8Cql5grk4NfKxAH9zL+YLXyyh1ALOklVbVOlItR1mba

NKPU5pbVHaJfGBhp5ogp3IRycI6cboU0ehG3R9Z3pa
pVjk0IixN8HyIGgm+pOevhKPO88cygvMW5W4xEaoV/6QN3/+/5vCw3A9hjYTSiIgX8+exph2rdyJMZ6W

+BouoioHbQ9vy5f4Xum201BbaTIluXUluQMOqugw4kFo38VPYcrd5UQOYeNvt+XvTOIIc1qeJkjDEcak

qrSYduHUSXJU2Hixj1lLoLPN2vl0na5oNOn2d5Hkn6
PC4Lgq519472LecUu73ic3fOaFEALRsk6RbOypUiFG3Va2oF4xGcebSYaCGyQ5eTWy7EzCL+MCwrRv+7

V1wdqxkPOlNZWxqC0+PwM1eGxIYArvD0lQKa0mR6mCJdRFZl+1igzLNiMwDqNiV8dSu3KHcYi4BctAnm

gQ8kXsS0KKhFgu/i/zHhShfgqJtGDBaz5Gfkdz32VQ
2RtCVLsaspuMTQRYKj9Ent8lUcSWUOlT/6dJNO07fOGwkylj0z+sWcMy+nHiJf3ke6PEx5J38LekUcxM

Lj58pXF55YMtyBgfbl2cXYSf1MaqP8+bYCTcQgBPvM1q63uIWCccoig/WQ46L8yiKXZBFLBMjMAA1LE6

tF2EijcO7oyxWh76IO9oYmnkplfrnarK1kcVLcmyau
zvhsJ8+jVvivQXrIxLPv9P7fYnoRTMKamXzRBbWKieumCEC22XQ08xlADRG2GwFTVULvNlgVf9R7alGd

KPz4cvpi91L5cPHGz6FFe45s9chjlD5xI8FEflYOO1B48Cbcgh42S3M8sSK34hBRSjBMqYnN/2mL2p1/

9yuv/HlmTUH2RKZ6/7UXpvq6HU5lj3NPuhguAzUB8E
ceFHTF+ttp5EGpetg3kl3AgDuobJcl7MGpvO599edsxRzWB8M1h4nOP6eAxqbGyp0HeZkPFj1/6sPEj5

4bUedCIGtvtRzVPkSSMy+IoSSRnaAybhtg9uwwm6ZOI5YLo0W+aRWzGHfURg2Zc8KXo0EpcJOxXTAAkL

EzSjCzkPK8v0BpP1G1fx3At9wkdRdcLP9dtzlb9xk3
HTk+BNqhyb8FDKlpH1oWWgkRl/QDNU7xoGoho9OPdH+Tz7Yi8/8QKAtw1tMk68B+pi18JAmOlloaUvoG

xz6ILxX/kbEyf3A0wLa07l3+IPeRkQe3CjaxL/HKtWfSTzDyPWy6egCT/ILZHUEtAesdKHR6k6WogY3i

n+BlPTdljJ4kSEX0C+huNHcxFT4dd3ARpJC0FqdgVB
uBA0n5LipwujCpG1Qu+XbZJXohkWhqadeVwrEG8e7K1+IiwcoM0Zryp75cRNbWqExX+VD2R4jkX1xWqf

AcOYx8nIRQ/Qgb8FUSjYzvk+pu8s5QKf7TzOLYC/j8eYTDzpImU8Nz8YuDUF8ssN9S4iti27OKBqF2KK

5gfpFFIVCD3r2oHSY1K7CRB7nsGWz/7kfBMcRWO17X
K+iKz9D1zYbwfJViKimmNoKvHiCUiqfuPlzUunNvYmcJg6+pgqis7lzYE5SDNlc10Yb+fGfM1wxB0Buv

f0OLd20k3Iw+ajMWO87AeAbPARNs+Jcb8b6DyJAaDrd1Om7R+jiq826N5dUq0yncQoz3yzcnSRKEU95J

LjqyxzJAvMCfLgOiF4aRHtw3oiIIizO90pRGFgBjgJ
0tzio95achANtvmOHids+lobAXdVwSjWwZWWJSGJ2aJek0D552ipt3zl9eBBh6Eo2oLcC79vC6nTcBeA

7Ql4qy4d38cAEUzkHFJXAHsigoJ7WWvIVVL5uCCGmGPKFw879CzRjuKyYt2kcmaDgHpdR/DwWlD8NNO5

MceS093EMKWa/R8t24OjQfCjeTuw3iirLp4+pN7NFU
TzRzWd67R7aARyrZARpz/BszGNae7GvHEJPs/ksTsxgROGis3g+WVRCbGJTHt6ZswrEpoxyUCJ+W10Rh

GNygnG4U/fwi85aGlHI3G86dq5ICPnVUdfaCB02vH3L8Kr0v/HNrY5Nt0tGq/LqUrLa2f5ee+Bfl+wR1

2SL6R4f9/k0CjugSuoq8dK2c9RBk/WjCk5mK8+VUj2
GnJ57jisPDJ4XxZvaTDodN4s+NNJixtnfkvWozoFgzNwlpbH801/n8pZf+Y7OSj5h1BYqg/JOmjID+Os

L4e9XAWbxK9ivB49kQ3ZqsEY8D8xL4HaxUNxKEKjXfrlJqrZUKNDjuaVZaovPZ/nP/XjDjzhg+wXUv0x

0YCCizNsAuhWBkhKAkDJbN8d6TZ+PGI6PNK1MwLIkQ
dLjlOcIttRSZ33MRVOaV4bXeKqe+8+gVmhfb74LPlxDfntcf5zuyxV/e2gfwCjCBz9qU95eF+rVhbhIK

DDR3vXPij08MmTjjkwsE2LDVudtfyMCeHy8omXXl+Tsko02/eMfe7M+gZA5C9McPJz18XfAAZ4bH0SbS

0HIGXhpf/kf30AeXXjEDLauVaoAow+y9vgBF+pPOsU
sd9sZl4fJuJ5v4LBG+g9hzI4RorD5VkvQ2XPAWcZJSXz/ubEm5yR98oAwnN9Fl0zPDB2iFbSo+hwrcGy

RvT0dbDkKJKrkEJ/tjJ7oV2kFzEjeR+iGgxsDp2X8Ot0hUalBeQNachCi3qR42w4HzuTkj69/lCk/5s7

RBb1oGydSphg35Q7xN4k9h27yGl0m5dWo+D35o2y92
PmBIMwd0kzH2HGq6E8tkvee42Uigt31LUUqjiMQlaHzTFXelPbt68uTy9yCg70ObxV0OvvNQ+9C084gO

w9Ef2USkStxl8kupoU/XgoIP7Uhiq5iUcUvMWA65o++8QLiRSCLiFQ3ousdW9OPjSjyZKYPgJBqwsT5f

Duy8BsilxZqsZa0U3APafCZWjgPQ5AFYona4ddlIEv
ujTYN+X2ejfz1K6yejpirsPhRNrV9Ox23beMilcorB37jn7/35rDv+Jqe5kI7U38aAZtCAu8GnzqF9AM

ZLuRQzE+84EVzaidJ+fecQPdiQ95FZOi6Q0OJ4CStY1CyMB6rYSxbeI5UExeuclDwYoaYIQKXM8U0SpV

pImrRK02Z2+obYygz27vH5n1eSutnPvEb5RcLHTuF4
4IQWQ4hTcTP54v5EpNSSSOfHBcy3UM7jW0l4JjfbxpWYzewhTmi2Djh6fW7+9UYOGsXn/gS4VXSQlpIz

Yjt2Lrzz7fWJbhgw/ASNZsseHDQ7YPI+j+lguSRpZLIE5LXvrVECMOWYpaGPOAGQ4ynfamnpxDCCQQnv

4UoNoDOer82ChSp0MMXIhg8whpAZM1199KFidtFISY
I+UQB01EICwefuzGFiGbtQCEGJHJo6U1ohHqRlCjEYi76jTLaua6A8TFmPsrBVEsj6My3AvUwkIPRAPI

SQ3/sUAR4l/f+9sLUr/q4R453EbW2DQQzzSkvAh+Am9+669XdvDIsvIZe9EyjpBHRr4D225dXa7cBlzm

0Sx785RTYvlAMAgpW4jD/7mH19dNjNfWKdJpjNkuL1
oNcoGNXtjl4Qi/IcGaU5OgFH5HDfSMZ9V2absQ2gw/H+p4SVjdxdIk0u21iCVCpZO6cPwl1be6pq9mty

EnZcpoEpC3PPT9ugA7h/baqBMW2ebPwoT0PiD9FA4N17lFNy3qiT4IrudyeiCf9I1D6Rv5Rgi+/e2HIf

LGI24xvjuExACNYbVjt1CTFHC78Wjm+Z+SvMp+SP6L
bCR3FdwlABE2IA0Pm7SMhvMuOWB5Eo21MVWgbYvwnS40axZKWRlXIixqmE1DiLf73xB76AvD+RsVSasr

ZBBKhvDe+ZHlGH9Ze28DU5C3jNfNjbZtchmNcqEBm6EDXVXJ+JuxW80NfnklhrNoxis1t76KodMbX+Pp

3ahwM9u35XXTim70ClRZ/euElFlsUoHszCvzRutf1G
7q3k0wvl5kGzgaN8JKsqJ8JnW8yEpwygwVPC+t/DTJ2yuMgqOGJE8Ek9rya5uukiWmcFhm8c6mnKpd+E

aKkLrtsfbWD2zfypuH2D3bFq5nmL3q+C5W03QxiWWokWVDAebPUGuvn4wQlcmR6r5WPyZUdie3CqZdle

YvOxXDougB5D1Un22jEO+J9un1UL0Lqt5P7v8I1hTw
Nse83vg0xX3OD9+i/ji7v/Wp7VPdpYV/Agw8Sl4wPbX45RM8W8ZurlUl6/L5M/pahr4KtUIglMdPo+Wv

hHNQ3bgXBp1lhBmjhlV1xXp+tFyKEzzL9NHQd3g2MG/NAfMlZD3ygfL6X9at3cuJVmYbCAxD48xhkSDo

h0eIRCXUY7hesWeLYtBBdG/SO0h30B2ON5uKTZNoT+
jDB6Qlu4b8joUHyt4LdiGErNTSkQqUd5MwJ6B+Iu6HhUUpoAra/JzUi0iYSjXQW19mk1/eGnfG3w/2Lu

IuKtHWa60NQRQBPKoUMEJNtedKXn6n7fQGGxOCV8V+0FJ6lMTSJB5+DxHzeeJuDw9us5tiy0k+N8P+6P

LaWhD4T2n4gg+t5p3edipTHj3lgeLkWi5xeCdthK/N
XfOO0JKMC9CDiLt4qC92JYfIGETb8NXvG+w6q7uDUtpPsOcWU3qCCpC6VE8YwD3TjwKRM6qjE+0xTksV

Rw7oCfy2Izuwu+pPf8odAQ9e/tYQ0GM0rfIW6PEI4IuTrOUuxvHLUaH+m3JNzJMsLo9MfkxQ10fv/KmO

Qfx8q8gdMDYT6PHlGAeSgrjNNeHxlL4zRJQyM2Rtv6
SMH2tylyLN9/TIUgmVi9mhiJ/NXz0E92kCsgohD8GLHWdWb8AOhBNP/rCOVAxbLt9geodFN6ca6ChAn/

uRYFOQbRLWoKyYERHccGgKs9zh22ECY02AKm/Mr+iamtfWkotIH1CHmLt5f7UDfxysM4EwRlVTJgHAm2

uMkYd3gHO5i076ZDAg+nXN5x0h+cNSvTylrrzmyEMd
c3CGQ/AmLa6jXUzLK/xljWj0u3YXrZ6Wptj4ZnC3gAdnO+ynX3R0Opmk02gZkmTtDNNS4CPJxjc8jRtf

uZfC7W0xdd13JIVX4axeReq6LYFgzlluevsjVBKIOsTgXpyKmXK28AIaLnJWMWcHNcZy7weOMAv180BL

LoyCA56PsHRQNW2Zc5N7raP+UfWcmndE4n4kjug+EB
2q/UKQwIfmuoCDv1OHg1SWXS4/TQcYf6h8EeGwPBWSI2hcyg6hYEpe5cOX/jn0MtuI+0mPksRyMOp8d8

lpxjJF0VxmYds3EL71DPc1hjPXCHvtQHiI7d1pdW5CpUnb69pEEBRDIc6s8WFG1Qr1uTAhuxDRgszyGi

H9HMGUX8unOuCK4I0oXhY+aiqEE4WY4OnEUSrgKLYI
srCb+oNHYO5RIgS+lPCAhuJhHnoMyDxdtfIB1kCXyfY9/PVgwZ0eduYb55KgA826ZwcYXJJOrUuPkFZJ

sejDz9iSi8YnbSEeH9HH22gUMSnQnWikYe1j4SaZNBgtvLYkDYGXGM26WetU+z5hSHyu+hAy4zJLcIzo

UjI+v5kCXfXBZH25PJ8FhFEgXdZWlIHqVaISCwzcK5
DZjksMaUYDF72kZziyXhMLtWpD5MkSxoWjCObnoaalLtYTv8FrDAXtN45KL8p9kJ65AUwp7arQmu6st8

8+uafOj/tQNkw0y5fruXPDC35EzagpRJJiDi1aYkBnhPz1q939Pr/aNZE62IzbFfu65bAGV+8QCKQ9pv

H9KxDf0/LXO++Yh33lpey3ddKeeg1QB6qfO1zULvyh
Jwl5NzaBjfNrPk1zS52ggo91Ieg31CaSrWNVsqx55iuTA9jc0hR4oZJzW9SaGi6jZ6tCgDm7Pnoa/gPk

uPTrhHA5Yrrou/xhQi4Rjilu2+zscrzTBDDyDyMPnYf1XoktxiF0wDHrKpemQ6u97J0/LiKUbfisFYQF

CVlesBWiC4meSRpx1YYIj/u5X11iD7xYKSB6kA3bzp
0tgNd2nJp0GBFzMjjr51RApkWWE20Wovzl0ixJSk/Xdz20L4qgtUq7dIPL86qO8RZyvKz1gDxLkv328t

+F7dMQ3+hGerGScAiM1wQyNV+7n4caSg7s627FdjaBGFQKR5y6HaPw2mH1r1LWnL1DoOeMObrgLA3lZn

spy+X1se2MXME2lGS9C963bTS2DDGvixImDdSpqbMX
iMsRvJF0C+XCI4Wif0nkW8dp/js6H5wAqHlej3jY5rJaFujHAoTbkbuvhIJ9gNhNdFmNlsUBeNEsUKCP

HAj0V6q7loDTX54E8oC1/pJoOsmiV44FW9W3lgTm29Brkjfr9xfaVz/OgYkUSRf7q3dhpO6jGfa5W2oM

hyA0y9aBjC5zydI5IsDWcLZNuyBmStB7JXPVKdLQIw
0BcmUXgbjIZtWO6ah7md1mzXOEASbr5ejETvE1+T4ctS7+ExL+xgdYl9bfm8EnOFmKViCZfOZRy0fgDM

TzASLe0uW51N3NUSgnNZqb/Vs5t/Pr+172praphIK5N8krdJX1a+Tja+Sqth31sn+1Y8RwH2Y3X4Of/V

9IvNu9AY/ObF61fJfiohiuGf3I6hEv26PISY7TwUTa
IaZz2jXZLDBJHo61/M86jaUb4SUD0g4vjUV0re1NY05AydFaOr6DqgJtTHQ7M4ccDBbzlnfIoRAr1gqc

tlA+enqmwEO86xmlgAFHq9RoVzeaBS0kiGu7qOAor8a9KxGj2xKx178jaRNC+Fy3crxdmrfrFlOj5nZy

N6nCkfRdW2cGz1Azs0sd4VA5jEA40DFNo9bp9Rm+eH
PP0TQQjYH98TrE++o+rSAbWnbI7maTAC5t9YnUbkqTdWTi1CsjSYwxf3pTw7gNr5N5poHjjHgcfEkcC/

qO9r5AnnnwMqkpJU+d4fSQ90xNA7UdpLtdO62F0NeVXKnkJjsFBGrhXTqCSEiB7IzHFBL3NedyIUJG74

oz1fhCe2vKfHKvM4r1t68ZE8vzpfBNWZk0d72EmzTc
QqKJeffhkFNyT/bwyLcHWPam4DnYaKewFjsguwldXFzz9NjJYDXdmMcZuDprT2mO01YcMYltX9Ib1uIe

CswgQC/6/dkrahu2QFl7Qkulj7i97ItTecrKe/bYGDROkQ3oI0D13DvkBdifk0nBMzS5k5wJZ8hVgkbh

7VZKVXO+sPAogooJ/qFFMZKt10fGt8SpTA8u1j/ScC
swYEh5LA+Wssd3vFZG9oZu4UiE5mjHSFPATadnlA8q0ADCuGG9cGIYt12lJIr5fAwAb3Ag5anZUWneQK

LNYVahbqAIcJrD9KB1bhsYGe0m6iefJcw0YV5/x41l9uB3UVVrGX1aNpe4Xk/irtnZ1To+m68/+lkzSZ

1gaJ6tpgyQkPk6KBYohm2i7UoRtlRcWAa3MmN7eCJ1
P+SZNsMndZ6e/94+rUcT+ZB+pFiCfo17+ETUUr3LydamNwyWP7fdrmxFSweWN8+dvMVRCB4mJMZg9ChK

COqzFOZP/5M0LGxGr+QxIMcD3/x+28lWB4KrNYwNSXqTXG2VAvSHh3lzsNnurFFHhjOK+NSthI9SS80v

Wx5J5QYBN4FR+/1hI9+C5HBTbUABNPfNICFECMfSyj
eCL9lmna/ECVDuHvuP/GslWEzYvHj+V4XbK++N1TvaXS1kbGCIbNIMb7zIfNB4kSmNaobmNOBy7AQrOp

4HhyqE7R2hdCqQTOf9jKi7OBPqgKfWi/DQ/vDqAEyVFPelihl1GC8AuhY90nQZbPTcHcCbotUkxcG9l+

udvcRcbs9PjSM5uL6OCQbtassTKszwvkqUj7aR1oqF
4PElyl0EseduLM7xRmrvnIC2kGyTWBUZ9hLdI4tc0752U4FtI+quTAsd75tkpBO42O74uaAvmnf39OxQ

HCNVIm0tPoQypmYuwPoWMYsD3qzFkIoeAsgIWpKSjYt86LUcwkXCuKFmRL4WylbUE1cLLI5bhTinDh6j

Gcxi1nQYJPPz6vbu3Qqqdx6T3Nem5HmT/gFpHtwaWg
Pauline+NUfJlgdS5IIaEX/qdlM5TxTFFXNlnoFcOP6vSc+UA6Zh+VIufytXlZy4WJJz3R1OLujMDP3VVYl

akDJF7h5ucwkPMejDoeRndBPcyjYAmmbnS60p7bPHeO3T49XOXqhVaQ8U3NQeLVn7A1HKwl4DIwei2wd

x0wbz/rXLl809VZR3KQfDUKamwLf18elwfHJHKTeR/
o46PMmwiGe7o1Jn18oPsWIjwvL0Pp+aoZkzYDiQytaOfojvvMVmIhSrqUlPonxcoSDlpZp8I0vN6yVXe

Tx+wPSESFWal6L+D68u4w/bxIKCtKOuGryZNHQdhQH+Na42HXZwVXO4zTvTd2h5E9/QyXAd0hAC+ZW8m

jVYXlbPcjD4tiNvN6CzzlsRS1IP6EoBQz4ZGuXN+78
0ooC3KOdKNCvTKK9pqWs5bAWzTlwBS+Q8pOPvvJLGG3NO5LvJXj0/VPn6h8rZliZ/+249tS7DmOBfzap

TfrcdudziZFEpwY/dagV16j1a/9Y/j/uU0/88uAyPISqUCuh0X/4jgWbuCnwrr+XIYAnTv0Dfe8bWyU1

YIyhMstlxO8stpHJNH6gzSyBtXIGq1vJKBH4arhWuz
BY9MvM7BmPkQ/BSLU3U5MyyyEQCurJMk5cR1dz/ZufWCE+qT46H3HySBx6LH9HX7ppTiGNhzgOO+kdyy

7ooP+N9SMIVoRRmtrFslyZA0AwXQetelNoszKND+SZ2p2FYTtfcLSou8P/KbEIfAiz7SQ+XvJw6GAIGZ

YkijFLy5vJ8VQBbGNuy2VWb6yP9159he36AKyYviyI
MCKENZIE+rEMOVvG4ORNu3WgHoWiCfcBKTJIG5267MaD0KL8utkSMJIow9MAbqNWXqjUoK+TCLaZmqEWY1KSr7

0Kp4qsF7J0zNUsBjWSmvyuChIzL4+tHW1WT+W89WrdEPMVHf+Yu2W9eXPQVEvnj3eF+5i+gznoFuXFPv

ELp72FLlTphctAr0HnLi/eFCJq0taUkFkytvbqBrzR
XeewB+/eX8CcUQFmrw1E9n2jMIY7LhpZAEK3Dcniabh2BJ931ocbnmcbM3+H/bUfB886ueOBKjAyzciY

GYqqnWnrW8SXLHdvC39VY8HoUDswMXb4qiRt6IVt6NVAs9z61mBOmo4rc2V6HHMsqV93DlnBY+xeG3f3

f1LlQa8EJpNt9Yxgoxbylw2GnTTRUCk6zk/Lat1AKS
Z0GNxstv8Kk2ieyDWMiF3v8uBK082Yf4ZP0h325Y26+bHQEdeoDXLxmQ9ptrWh1t+7nj9O0YtaZGkRPh

f+lro8mRPBX/YFrEvxoPwdH/28a+naraNWIAPOF9v9yGyY264cCGu6Cqj0+FC0nV7Lcih9sJ70BQBd1K

UyUeHWtIxj6fgTJAM+mTzPJEGcAXF7qSijmdZLo+Ci
sGxY34XciP3rXVHLRCKoTuS+BY3ku4L+cSxJ7mIbWU4PMaRAycq7BITHYwkHQKnGaFiQqES60esBcGgP

dEOdVWmDznD5Rrrsv6ZGGsFhQG6hKcnqtsmekDEpcwFxqnEwkOr8oLF4yGsHtwpe0VVr6aNGpYsENAJY

MbCUmbYzZ8AsO+11zHJA3wHDMrKI7njidST6TOiYZC
Z6fAJKikU+XBSNTlFFFnbK0m/15tpmvAZXpb4up/QYcbs7NRa3y827JEY+2G4OnHSyIWGC4RMwZt910f

zxVhM6kap/0yCFPfrtL0BxiBnKgyaG1DhZJbPSrHs1oQuDP5GDICUsWWvVXzri3yPvArXsKmnIrvVHHE

5/sFEK2YQFjLHKVU0xmATrdq/+BRxLiP2ZHrLMYKrb
D4VY93G/i5dznTBjOIzBtX6z4y5WuyUr76jUhw8Bd5wAsAPCkIrtMHIkKVGLz9hXUrtRKQLkydlsE3dx

cVP3UZijufUsZdrquF89voM6c7KeSxqbvamWurMR3XGWiPISU3UQ5HUyzn21uJwerPx8FNG/TkIY7KBD

1m/qrEcgpyivGnRQNSLehjUG+8tRpBCdJP8ji3GEWo
6qlnhBk5fmPjsGWMQtgedLMDsIRipFDMJItzXWIeXKhO7KexgkG2sv3ZyJx7pZG93/8u/y6IfsiQkrOQ

ksGxT4+XbC4BgKPnk3ZtxKWCFInbOL14lGzU2jNOcEPIOuUKsua9GmLNiplW37CeCdHKiNjdjDgTb8rZ

u9TrI0V+I8RXf3mw8cbAH2B5P/Rl/1Ub/LAGPqSPgY
cSdyhlhteFe/itYMRUTTskQcz+ItLUTU7vxHub8clSo+tao5BNB2kJ/Ji1D0zcKvyDTlu2tNIZRqW/Mm

X2H1nQ+/DtP7+WxrwkAVolKxUvLXFQSeEfiRIZ3eteImszAH+wtzrG2NM8Le71KQ6Buw8A6OXBlC6+B6

yXaLNN7zBC56GfNZ00LcKFzzn9T6ZbSomPqMW88Wh0
ALr6BdH9tclWuZXPUBvkXW5PNtWLg2iAHj3HqD5+GAu+Z0gGZPWK9ipAElL/hXhNoR7DhLaXwM6x1eAq

e8uWzVpIctFZGKZ/LBRDd4SodUnma4JskfqbAbURRFLQKZUdzrZwjHg3DVWxu8C12jc34XYVIlsgnsWP

WKyXw0yqKxWCXdFF01BfxO8Bw4k5NlnJCx6K8sTTvn
Rp4MPqOfcHJwro0BeEPQ8xHTTTMggwqDFML4ONABEa3yaiOxbiDYbIZGwbem320T7PlDoHUf9KaYLmdW

/aGk9kZ3XTrXKH8An0uctNbg1jPXANUiE4SGUiTWuXlLGRV3q+slOgZO3A/J4cSnPWMtqsA4LHflNyjn

OqLVruGYAgPm/91IBoOMVVyY0W2fxXupqecRCccvpj
qLV2GcozUcx6XR9kV8Hif+2tZ+al7ffATjFswvj4VBasDQ745Q7i/+PlYGAnDBvQr6ZosUIDFvq8XvUf

9xqZGt4gcCg9orZHQZyvfctz+ahEKrF575O3bC6xnyzoD67qwC0RhH1swcHPJT/o6P8nQllXv4CRv1KD

1O8IIC4ZMXrXX8Z+rMYWbWMXMPM2Dpq1oF1lZ4IuRd
rplI5jgGAp8R9U9aZQQfNZ9MyqA+WcUCDSDjrKwkKybinlrn8Qpfuw+Z4g0Ngn4Yd0d0865fER4nAwPQ

mbwxfTvk/MZ9eaz3yLRRGZz6GfL2BlDdqfe3B2/7Po+gVHpbRPO0B4OUEzuvLqbAWnLFTTJmtqYS32uc

YSika64kHoaRAFif1a1qer9dwT01IcnCbcq/iQh4OD
Z/hTS2H94bjTxi7OBwGm8S1vdPd9pV02dppqG2Gc9lJReD/nIJs3Qdj9DKWbKP4anCNRmSmfsVuAw6HO

n85wdULFoxc6gUKP1FaXdCF1FiIZwaduDE5CP1/uvUQIo+vJvklK7oleRQRgQ+KQ96hZkRmUjDwVhOpf

2ys+RZAWgY3/cr8FxW7RQdQ/X043LGGKYL71n3mmWL
l4wpUYc5ErxhR0oiHzm3pKI2MIMTDKrjUvOp+lNxo7qsaZm6YdwWdIEwvFOcL1jiq6u2rdJtLvU44Vt1

xtYGvIT0oUQnJlBO6HZp60stQYYIqw9W+3yNi3qoD97SKtgRZzGoV3wi86aHiXm/Cjmkgsw9yFcQdvgg

LCrUiQqAEw0HOPS36EdBx2YV+FVaAmVmf/ycCF7j80
c/WuobEadhc8gRhqFO4ESNQbacaz4wTFn7XXEvoIemIfeq0HCO1dEBV9SSiSh5zY67efDtVRMvtF5fBN

PQnLGNkOBHoUoDAzxWevzc+/RWlcG0aPTcvoYRZT0rYere0misRtpuQCGY0qABDrxONtB9GCXqa3dFsG

lQguv/5ZN+EMQnYH7AwEcaMqKdz+l3DZr8BZCAmbId
TAR3HTsCgv2GvCynozCGPvddLYSkWbHv9BDefjAr5w/ih8W9PwmMyRMM1vKbY+VUBaRbXxnDGrlbpQy+

aGUbLb9m8VBsR9wStFwVaCPTDt0MGOQNeZ2TgVe9Emn1g6w5pS7Y7yiFSEmkmdwYQtVSBLSF53Dh3Gu+

em2O7xU8rFQczbZCk9+ko5M20u4lP6ztK2VfbfwNvt
xRhBmJwHo0DIYnGOVipQ9eRiyKmxOSh67VWNUW4SODBjso0G7CYY8m0kgdm03tcM7zweRh9lY3BRtloO

jHhhpkKzYDFmtWTETr3/yRk91545k3BsrQ2FpKVoVSqPlmZ6vBwKtIvlwik+TsSUAGlR3s1VKQxBLtG4

kSIe4yINtKWu7S9OJ3a4zM77B2ySdLhW7hAFtt81VF
EeIjRIG8MUFCumlrYBC03vPCERjRbLMDoPuRvNQavKNyNEW0wtpcai7ZXaogvPNdhcLo2ThnOg/TwGzv

rAQheAPLnxd2S/iMfDNfnJ/Qm9BJqdxZNy6bDIuLcm68fwaaD593BQ+TLXZTaX3vglR4my9UR6D3FjWw

Zkz+8GFbyjIaFGgq7iJKwxcY7nuZCPdmeB8QOOVnaR
VsqgkY3ThXaYM4qQ4KGE5hrM+kYXSmSKbr5BdTL1kScSLnJPB0yFfJnwVjA+RIqhrBfdoai0lk4bN8sb

J0DknlmhYD27wDtVJ8WBHgMh7UA5ugT1sPbAmX4uVGDmObhG9SkZaJ0qx2mwOz6KELpmF5ebZSWcI0Qf

RtXp2sGa8jBUI5EjL8BUjTUqJg/OKxP/kaNnubJNwZ
fA0/v7mAVTePx1ePaXcD4q6UVQv9bhCnsnSa7Gr7A+FInQS/hHCGqOSWK3VOa3nUK1AM0OnEMImtinpo

zr/VoUaXDLg5duzcTG476EY/AdYyCftWCvFatVdtgv13BeZEP4wzmAqVFxylsPm6p/KCzNjIvv1UOFD6

yzzKUQK/1REZlyGkndGfhd3Ok64Mu62XXD8UBbr/Fw
0kSKGF4awz2OW+ytaosOzi1q6n2+TktilMjwi5e+cxGa0yF67Rfaw/Is6KNEgEztD05fFYh+R3FT5LAk

99I4CskTTuzb0J3CdyXkBf7rXyodkuLDVE/yhA3SOngxspQ/MP7GX87CAyi9nVVtO6zYkwKBTs4PzlCR

Or/BGHllN1sWTga7jnyLf5SsuPt0ejN9WTP+WWK2jf
vAyiZtxsrKE81yk0W3t6DXpxbNQFoSD5XpV9XgdEQ4iZvsxvGQFcizh9mb/pq8TTY8iU595q5x4OK63D

X2l8w2vlaTG5aZ3FMoZcPwCV9qOubwL0oI3zArE7EbcP6V8GbYGw07u6bsqkIUwaMR40L6EV+MqLlqz9

fcCNyJZnSUbDEFveYRmGpq+FQui8K3zrnZQ9fGhm0c
lBzxNtdlvzxXbWsBKcnNzXklam/o+6Jd7lkCwN9vX2zlWjnR34BJ8sPvh1pIZTEgYmksMQ8kibDsRXIK

YKoOCbTuUe/AeJa6rKMleaCkRjIqx++SefLUpGG60hOedJ/gVqedlWTwhsGyjcwzMe/IDCP3cFqf+SZh

w85UGVr2oQGRdWX4oht4hE0zY1iMm51/BVICuRSYUw
2ddojG9LmOu8ADIdWfjvqpHd7AptACNmmj708SmNCgqQkRo7eHwxpvbqRf8TKD90LQMoasE6jFr4Sf06

g+r5Dhl5+um/MZtyAUNSgwjQnoiACDWrYi9PDm3HmaO6rQFQjX6J4fr2Jup+06hkMThPae4NDUzLsbYG

7Vg2kTRB7YpfVgedianagGDnLduUZcBN9HlSJFa7SJ
TEGhh9hJd2ASswIJ4yD8UHvq22wUqxeL53YysMghObf5yBeqhNkp8PCvRThal0sUA+j8p634kzOcgSQO

/v1XcX5KsJR7uGKhqFoVtI31fEmKKk3r5euXjWPC7iP/kTMGPMFTvQ91p23Yr0lv2GwNyF8ydNnCOJqR

veD4zLpnreCvbRCgO81Zr0dJTQiwVII7fO1m8ngYB0
OUCU3RRRECj7aGBimVi6yIv+VYqKHJk+rtQmMnky4vHDX38cS9uPzvf3Rg8hFLk71ZTlGPHvnfjINP0S

kggG9mN8yZNEqiy3DhFwKC378fb6xUz0aXN3oaqFjzjXQGJiJBmzOrscc5XEFqrMaEhgWa51dtXvopt0

CdhWRGNWDcyu41epgZAxcmuOXAHRxXo7To2//SDg+H
tBHzrlx8gjdN9FWFsJ4pwJQbFdXXYMXbdvXITmZO90NW/Yhtx3juxUyfsDiOF6JfjK1jJEeI4ZuhkTyQ

2Q2awnjfarJFm1lUitptoyewpM/eJG3/+P2dvauf2kYLvRRxk3+02RacytkuQBvHt/3ieJ9WOAn4LfvA

9zP9NcnUKCck5T/8MPu6DB0SMEOzkRL00ZTpfXHy2e
GHZ40bgjdGIQ9k7ZfuuRD77GjtF5/hvmjRSN+xCRXyLIb72mPXyVEn8mXuWuXWTiMCbwD/97DghS06Rj

SgwbtDPhjJEcKukEIwfAlQeBR1FyyjwZ6lgVAsk7yIQBd2BaPNoMBqGhhdU/2pTsRmmaPDdGueYtRGZf

duR09cAXeMxSMhh1atrF2IC2v1GGrglEPbmpTjB9/l
Hnk6MLRQPhz+H4ucfD4cn+7c/kXGLcKilk5gQIne7BvQKGeoTDf2ix4Ki1y1U5mV/62sznnvwJd8ul2U

8LA0Gxyq6iCM/IQNXTE/flYdU0XJ+MXNctwVsfHIh1jCd8jNK5rW5UAg04ompq9j9zkpdaM+fZSNJ8Tx

RxgSMuHX6hLptJW13CCYOixMVdkqLx1YnH1bgjWUF0
HWBMZ42Zfi/rnugll4JesGJhd6KNCBYUzTXYFkE5KS/OlWuairGhISLrxvb6I2kc7DnwPtlmhZibCULN

pNe2BlvXd7ANotkvPN5lVscTV+lG5+L+AkWjxr/njBNj7lqY9IMK24kx+u4AjBbOaQ2Jlgim/zbe+Kxr

5el+f4A3MnWczktTNh843wat43B+CXv2m0TDssViNK
aICoVcfzxC3DyxgaeOYvp8Ff3pFsJOVISBKAd/3F5WDsrY19d4ia9+vBjVCWj0J9mn3QScem0uidVenK

Yz7R7kKsDlbVvyjSgSr7XRO1Q1rzOMY1XXS714VBPV5oIuJ95qfg+1y57DXfN/Kilo/b9RNtqcAyxEJNoj

3n2UvunOjVEF3NsB7RjR575KlYGBmWdekweu87Xxnq
kqttE9/epo8e/hk8SeC8zfAdOW0fkzao/MdUve9ZFrYKByHJDM/MNUVJs779+vlyAI3v7CT3HtD4HudH

TwT5fQApwyX2Gmyp3vgPy6s0uPi4+KasFctGY1WBsHeN9hsnAh8vr3u9lHpDeRu/5NkuULapWXASFJRP

NeR2CIH+Qi/shCCxvk4eVeQcQtI+Mij7y7kbuWFtvL
d0+Britton/hOTkq5uEZE4Obm8X6dWXM2KolXDI071uD5NC4BAGNyhCgl7cMgcSMmg1TTyeBJTTtTjYM99Rd

YxrhkLaUEYlTitOSPIkbeZJ+FlBRGXmm2OsHtiN54A1DJw8/jTjyoSI7pOsVXEtM9QHuhTeduNMF9sbk

ivYCOaiScMOa7vY1Kbbxf/GDv5WzolCEwfrpZq0dYZ
+9fsWhz0hkp3n6MCtxwbdok86WdvrfgwgOESSMMRb44PtJUes0l0HJNLyP355UCKjhriBR3MF20MAfK3

5Dp4K37kRTch7XtPzUVM1TLQoCW1WgXa9MLf9xNiogVg4JMuxwN2Upsvf6tE0y6lAgkw9l4/AaDxqddp

dR2iGbC3/DDISCaZ9iZM63SL3/ugurt+skCYICL6xc
R6kGZXnxEBv0YJjgxCpTjmAJ97xys6jdMyNvVrpeGDikTDBoRJ6M1O7rPAk9iRo5Kf9jx0TFz/aiYvsy

G+c9UTo9ywbNYh6CRQSJDlHQDzRxcxW+dG4dCzoaSyatd+yt5r5oS6TKS2DZlBNRXNoyxsgw8VDtPLPG

85SvCPO87Ng7dCrC1AQ0+4WRp8KDL8JvJyRDHozewR
gNvanI3lcoBdxZuA+gnb53PJoXVvMk8inPE1aAJZ0VU5994lNRenP07FGRn1h1o27/LsEQydHPrVfdZ1

Y+jItlbLtNdLJw/df1oIiZ/ysdAEBFrmumnm5qwlzIz/cyWb3VWgmmEiLZ4O4MCec9nJmPxvy/ALTwSJ

rInVMzz2pG8hvEli6ys0Uhx8t0Sjuj3qW8HI8kf2iC
YE3AWhowbSDLL6kwvYOVnFaiCCvyHbJnUh4q98qeSAzNOzAKLZOqh68uSZkMpq4u8GvuP3cnqwqUtrZi

iNWNcO2q3YoT2pKHyeJKmusDd+h94wTybIR6i95EJnoYbDOAR69jC8WiyYoQF0DCmhDf0PHOVJL7WT4f

fqAv25ekJId4eZ0CvQKPJqqq/XAXCOciDUe/JjF6oW
cLP1u4UGBz53PHe5fwRVI8U17+RwydeydqTknDsSMhQMgxsz5mKDmzIaX6MlX2mbHPFqdYbwXwZl8EBZ

WeVYQqhfmkGSev9qwnzf2J4sCxyEXmsG3xLl2foCTGq//BE3uTzynskChdvC6b+HI6K8JokjM5om3+Pn

Xlzmcn/lKvc9Tfb2yI3DViG8z5kB1zi17IGjNlb7BL
xjXKAgh06ORD9vHwwrs5ZOA5fR0xsktZy5ExuJBOFWBS8nsacnyuWZoCDbl0KlzvIFNnTgYPeRqj0t7S

/uMSmpdMmMFeBi0SidAGf8ODNWzlIP8BMgNky/09mbB7rnyRvdqaXeUsfDYrTy4WSkpoJSC43Rw8OFkM

r18lhynYNkhU1Z97Yts8zjOwmEUbFy3yC8Ihv1gt2a
OnnI12GEvTB/jaNlPXuD85Dxppz31fze2LAiHIXFwLVwzGWicNJ2TpWCx6dzGavbqQCtbmRgKKQoSJgp

2BpPF7gdftnnnV/S7Vzdxqq6zclM9xlfU1bkDg3q32ksMZkUczoEkc4MPwwkGPuuZ17Mv41Ryhg0+rHO

ifm01/snWl4lh1xh0CI9kXC+Pcb2hrIApIjbtk7aXi
m+afbV1tBbNgFyjYsV/Mho0yfklGhl36flFV3jsC494ipa7sE2HEqKvTjWNNrXuSxmNnf2+LAjQAOpR0

D0zU2WRtl31gDxqO/SzXnpFl3fuA0VHqiebb+Bs8dJGsn7XvdQ/ar9aO0kGeAIq3e22wkr0A25ZjW3iM

hHx4gvz4r49C4g2Wv2hwDEvLTqt8sJegznUXcgA4BV
IPkXfv9OS6HBNPzNp0GV9dCECIMe6xWzbrZwWiXcGC0Pu7neJbA6XIsrLg00BsvU54/HzLieEIdE5D2h

8nvu7lu5LpWi18jYKhWfSggJqd5zs4fvHJSq/06zFkIFFVaB3DfcCKtMvVchBk3QfnQEnF2k9jfYpkAT

If7MdB+6lY3+wLxuj7+dUjUBezqSX1gdwuTkWLvIKw
zi3r+RyDjYYUzko2d6xdxw3u5TxJGot1SEJP6Vz1C00H/bW36VeNCxLY4j6fVkBPHnltuY7UyNB9TEdK

Q3JMWrdpum2NOyyc7Nxq1140hlbe3vOF3vLNYbr9mna6uYDCoG8EhUqhN6q2zG/VTQ72oOPOqnpWO+ZR

o2MQuvWAJZY6S6VXPLY0MODw0MDPKhlRjTdmcCu5jh
k0Ra3SZtXCMjbzWieetDyjbL6F9FUYGB8YQz5iE3jjSEnzWNU3Zr4HGoLvO6ir25QN+Zl0kji7wzSn1d

rIfmgFGz+wCvsmnus6ktr0stm5tqclQGsUXpfjMMdcu1wKtRBQ2Ne6M+/AcLQq0Zxmd2brwqENY9vLCi

kwKf3f1kRorGUXWBlfhn5HG3Jar4P9TweB0819FcZ8
0Qmh1BPS9F2/RjZUOFh13/LHFzCXiElNb3WOf0sWmNVpyAJC+HqLknreRTS6ZqDX3xS2J8tTSFZThNue

mbFllEeRXlkBgtFPnpC3vWv8AuSejDKEcUKsOK1GA/IXehL3EDdbuwjEEN57DoXoqz40NhPDeYG5pbph

pdpIJpKK47FJVVhCxEmsbQValHI5uqPYCEdAj6rB47
1T+6ZUxFyj/uAYEcu8LH/Y0LxFROi797ImTcwntk35hTM/q3phoC1trQ/pjot8n224jHdok0vrjGsayV

tyFUtdXWyI/5Ahu5vU2XmLqJz3eU7WpDtwiP+z9nOOmiKc+Sa27RIqhgt5mPS5D+X0wMn/bMaLNPomxG

EoGTWWwhTtH71htDP+alaUS6vYGW0ZCUhKq66wnVDE
8G8Ks3iaLBPDdmHJzT61lZ6E9fgKDfZhxdyr0qDHmHHfcWGGr9C4NdNblvf0eSjWixGN8cwCvn9TjH19

SSEMW5MaIqjHJeGzZcwIAgD+tX06rWO/M/+9nPjOKonGJZEuIVjoCGogfzvjonB8lPwrNs0dKtwiEySm

Z9nHn7wcajyD06l4njn6ypPhECRMnNsnHySoWMg46M
91ep0pDp+qXEDfDahEVBG82FsYRBOqIYf6q3zgYGtnSd9HOq8m2cuo7QJnbYZvMeBi7vP4GTthR93B8w

v/6JCuuqLMf/Ce0naQvTN2a3pofJnOMoH7w2RgCwVOaRnPpeh1smEQIvfxDIXbVVUygFpgRJGts84fny

mlTZi1I66s2H8NahVgLSxh8resOkMRSgDyxjpw/Bell
iX1ESTT3i2/Dqi4TEjgkTP/vlkg7/gQEOmdX4oeoAaVwaeSzQhGLXT7ZZp9ls+pXdCuAu866wcVcINYg

51Mb1r9e7ozN2Axm0HGszv0eRo9jTOcLekgJauH65s1croLkCtWbZk9tYnOpqjZv0lCeciml8WsHBkQ/

SMJrQ86CuGC9Krg4wWpEqT0+i+foJcqadd05lvz96j
F6fYdSc/SyijKffboxQe4IGqdhDcUjQaLgSDJzcF1o7sb7J/nRNx0nSk9t+KYK7PwrxOGiAgygjqQmdN

/kLObIcq3PdrqyudToP+GKg5BJzQJD9ILUX7ViXG5Jbz51YFwrW0radX0e5+vMWKVttwBL3YTF5IDadW

Y0PZUQDFH6VX1OtGmRU8A3uy0fjYkON+vliWdW3Oew
I6vhtYpvV4cHfNCDXxv7GGnYXqrWkofY+4gGFSMXx2J6jI/bQC8ZCsa2PdCXGbC5/+eJJgTe6hXpT2/Z

bGZ6LIjkIpbA5KKqmHpZCAjHf68Drqolo77lizwQ30QzibaqsvtczPhi2a+lvQ88ZJuyW2OHfFBWSqm1

2pPY8cWXF52kHgVerTQYVqpJOpl6iS8k5sxm6myoDM
Pi45t+uD7WL+5QQ8RAYGeXan0B4H3c7xFOqClOvuyUATZ7xbzV34qWu2o8VNI3T3epnmV3pCWCB5ABtD

PWzKR9RiOVG+uR7CTu9RiDk2FucDTtXSOzjOyI76ROsY49Z9mz31miUnuBGWRXWTjP9ToIpzVIhBuVkh

/fcBhkYYdluPfx2+5rSBw9olu+8W45zveQLjalhq4j
sEbxZRFF7hiwAFt/shE2GhyEDgSf36mX9xV4xE0tnc4E0IHqnoXesIjDS49RD6AxgGesdXXhHDj+wBmv

LSlbz9OuhWMY5KcRBqIKtoRtjhYxehHv80jgyrR0slVsX8lGHqyivNJ3vF7ObNKExH0GPJ4dz95+OjzO

xqEB1BeLLFOKI1EGX7U2IMTg4GnffH3bWhmqdVwDD/
tp9Ztvd7P3QItu2FtUxTJuFA0XuKhXDA8/if64MgeaQQcjCmlTtdIhPH37KXIvnrUJe8LkV6xlkylt5H

olT0R5imc8Lg0Clk85bdG0aVP3avhKRwbJC2kpqDZJEB68/dZaUqzBxfTUOPo4yk61pKzx7qF5empzqO

w2oAg5EVQu4PtidKBL4BNWaJ4TGpG8dzN8xuIFbH2N
g2Sf/ddEAomKBFtxr2lAJsK6gc2kfKVNfxIrpcxCUanzUheFu3jAlHnXVnRBGXeZoHbD612lo/2jHalp

FZ8myC6BJAlrVgLs4cWLWzA/+srH8aqWeZDksuXrAv/CSIw4Gw3FxJapgMPDeppmAA8SxeiYhJaLGb7S

Ek2BY7Zj6oDHcg2lSFEHml1dqtbQQ6UY9zTyuqjHqP
qj88DUyAoSIbQkEYokf56j3q6ixCAgdjfC/Sjgpfv1aIjMc78i+vk6uf3fJLrpxAWCBNbraxowfVdpib

T380RqAQKfcGKeZ3Vcxs7X9yTTOQYSAdf3AXhtwi074a1NL2F3ctGdDHm7fsGgcNSlWf/VQCNoj5lkrO

Ljknd4apG0hrO8x/HXKzUpheVgagDj/7IefTzmddDr
zCdOf9l9E7u8hxBF746YfIn+fN+Z+brBC+24HxZ2GF0QLcVA0vGWPWUu6rSyCUFNWSY7Vrw1sCB7Rt2X

EA5FSz2gt79zZvG5fJj6rG0dk7XhnVk0cy5gJpIzAYQ72T/wJx1FI41OBIatkoeoH7umOcUy29Bd3QWq

b92e3omX+ONEFVaZfo9HbLW6sP+6KdwD/axHcDfx3s
FHDpKy8gBL++09AHTkaXBDeQ/wm1q+Z1dlM8FTRBy40NI9uGcKtpgy2MSjCOK6OlP1jcc92eQo9kda30

QdolRCdwaepsoW4Ce9Kfq3/Eu7E5qWaCtyJf2QzuWDbiq1NEUzWUSi0ZC/3HR7fVMoPpyTAaRwIhf6uo

k8LPUOtWCi9iMOxOOSYQG/zWgWGTtZqp4B0W8DCGif
3XM1708YJJSr/8UxPJcG9qEoYyHfKYncrJR8IWR11RkBm6nzzDkQoiIixT2LfTbuiXpeneyy2/w2xK/2

rM4tBH+9W16nx3YHgWu69VWrb48YwQG+fL8DwVFaCkYovdDVLkAG5b8xDssdJZ0LOaZ4Nhhb5L4PNett

NbxwB0WpKSg4WJ7l3bytiJWEm9rKYfOAz3147QOQx5
RLRRyUmGu/HolIpGmEUZgVeCHlJvNFJ5KHLSMqkqZeQrkyZaBcS78Tun5X+MV4ZRkaaI8bqZavyyOFW+

D3T+mh9qcu1i8sRm4cSDmumQuTp7b6KPUO7m0uJzcz8C/AgescxDxveWzIWMU/aD2D3Eyy3uTT0XTmDj

c8kqaY3BaE2hPbc+mSGTOZTqTI/h+adHrk/x+hdK4B
akcvVr22/fTPeVZ2X07if9pKxV8lweqdUg0ZYAlsiDyLxOksFhTRUKjfSd+H4qOzRs5rG/EkuQFCkGwa

kC1J95ZTzXnfDqr60UJtaWk/u0YcNhNUCdwiN+HMd/QCKti29kWsW389QWvCn//ZxjQEOi19UG6fS8C2

fL1FpYnwu+rITBkhazJAC4WZZoW3yhQzCvQKs+wc2k
D6oTH1HOPAhdNrFfyevV3QSIwGw8K09F5WisuTCyaY3l8zsJstu9svCvt3Cdtnk0q4Sv2tzhFJ4fH7C6

/5QDBvrnp2PByg3ahMGNafyLFrkZN7/SkazMq/1vmOWWPFaamQZpieZPaUeA3eL6AHoaWPUi5uMBEc5+

UTAXvgliC2WMBsRg+ZOOMSlKYaWIaQAX7DvHKcQzpb
cPhkHAMWreBsPoIH3KSCGHgrlpsY3GMu7iZwb/mcuzcWhr/unsBqrS4QNodTmzLhc0KMLQ0PPvHVkLw1

tJPLAMw3mJPSa5EoN9SodoS9m0vu8IQ/pLn5JYPOJiSS0uV8z0FXQ2T130Gh+y/plo7WJkvftf9zyd4L

jW2ONuchbNJks+H5tVes7xTXUHT/uBmR7ekG+SYSfU
8zThchPNJ3XArM6gZgow+lJKJ/opP+zg6iQ0ymaANisvm6t2r2SY8X9qzT9NAb+nKspT4eWIG04NGn6E

fAXaKHHTzNXagoQ7bD8UwYqW+N/Cgpxe+IQg7AJoekJnj1tF/qFaXo/JsHnoZP/qqDw0jcx2nuPZ+GuB

Ilx+8aWkHROAU4Z3KPGFSoV3B/Ty9clKXnY/EoiP4/
yAH/YGiGu+Wh8+1B3FJGXbSvA9/jBc+bkYDPK7f3FAMeSo5QeXFrp0ZS6huCN3mxNc8c0qUtRB5h07Op

XhcncJ9Ocqs7n5bIRrXdHrrBcK/UqjqXMSVashshSHnFG60OGHO6uJB90vjlMDKNZCqYF+S3+X5NG/EH

KTE2sM+sibpkOj5Ke2SewiR7rU7ztNUFOMI/GkDyEU
+rEw556uubU8X+pUtS1wbT/SBxY/oPpPAGvtAdxGlGNn6nxnWMgsD0d6pNYo6YPrmHthmDjUzSsMTRaa

/EADq/kJbl5VCLJ+KChtD9EzEv+vagoelRjrtfFHnIIWsPWebQGVPCuqL/BpzHV85ZbNawgKpmHMnnXT

xD+Sx1ra9CPpj3uo85UlLuEDbrJTgRQXb65ezU6P4R
BEpRobwsF3+ce4PtYPBkwyt7maBHobUMd0Q4oM+EQBhyQ7ti2KTV3TNKuUS1zkYgQjFE/nC9IpYpZoU2

3+wyd9GsUNYJeXT0wrUsFCmLytBA+U6XhwndXaJ/T9d6N1wig1X6QnQkZFf6F+rT4yWoa/D/UbndK210

vaPPB2oeGuuizD1I6CBeYnMPpjRo9UpvDv2UqiT/6e
fC2o+WAaKuegepn+5Q7oyp8VLBk/6F1pO/0dr+kuBcMjo1qjcF2ETN0gdEbkIGI0jZOz2yQGR6TbyBtk

33RA+pKwg4tRZQJZacYBqPHwUomjjwx5T/0cUOnP5PqCF5DV58WI2pN5tD3hkJ8xa/HybOUB85KdKsL7

B0aH4BZPeoNaUkpbPv6wnSluNWP9/5N/+HAukhiTDv
8eFH/S493GnJTbuLBqUBisUu6UzJe1cVh1DFL1RnwfjKFzDN+cedeVepOeiACHA19JAfLl6aKMxE+xa5

ruWgh/93Fx/69060rVH18Q+pFENe1JEASf0mX74MhwoqJoudhdAdhK61xpGNsxIWC2VzT5dUB01bEXii

B4dW9XPbHByLMqsGkYttAStJ1MCRc86tyXNH+iEICC
6+H3KN6mhbBfEJ9kmfNcT98uM5ndEa0Qcgt9gfFCKj7yNrLgW5Dd++YnGucWIF/MZJl+7grPt63DV8ou

ZNoiyGpP2y3sgJHf20a8DRz2sko/GRiAoboXKAqo+Oa4g5MbWT6fjVzWUsQqUsc8Hips0u+06jtgstq8

oXUuDYBxZ2eQlp5q+KYI8V3WbTgmcOzboQ6665ppY5
CfztBbiE7mfiKm/7PDCil5hridh2+clOx2ZGKCa+noYgTlSre7AO0LAk3YHu9ckQsuHBNc0o1db3ZgnW

eAXoddmVb1Mzo37B2IVPuMbFyp9s97veZLH4zs5LhrTbmNq+5+DkP8gWsWYRTc/oMosiEPAER8I27/Fd

9BnrHiv1IH/h8MiOp32wWSmxJikJomLPFNqDgVsR2O
8MYiyNH7mEeASEP3R6xkPrFLy7BM3OOedoGWWgwa8GjI73zyCiqzTM+w5BUjc/W006US8pCgTfi4+GEn

5d3CBdiQpP9Ml3MWWy9nDGSvapvewy5ig0I67+f6jQ5P+v8YegGM+E8f6+9ESHnj3k+N1Swk09+oYrYA

/Qq7LtRp2cE6TTgOLzn8hmQR17OBtjUa2n0DklXF68
Sp0HXTs6utwbsES6oHE5qG0GzFuYFm9v13Tb2j9cNR1jwfGVujBiOYxTvHYQF1kltQfN9yv64G2u/travis

8DcmhaC3bM0/+FLxuzNq8WxBQxd3XseuMyxgmUgL3Ud+E+suSIs6Sr0lriAENEVF3mDJwb/w127oDdpk

i6UvDDIt1xw4K/WJX32LwJ/EnocWw2Rz3jA9PWY62+
6PNKs7oZYYz68BvMwgGTqZcUkH5swuTJEGNaKQCFE0oG95bmtce0cxUxaPmnLVFWc70R6gvS0W53QAHD

wCRBEgBtlPG/THEFfSiNy7z1Qxcdf4PVTzZY3A6RJe4Ump5YAFVQlQYVf/o/l0RlqexevaqPhFLsLe2w

B9ioTx5i8lMwv7TXjCL7gNR1riKaz1x/1rA9MoAUXd
MlkvaZNFIKnzcMtMCDp01uDr/sqG6wU6mCaQ6QWEQQmujUrJ/16ItHab2PpC7o4SDgYNzu+PkiOzqh9e

o3XeTlsC2CI8GJETZnLpnQLw/vdh/a6MuZB2VH1v1nFGR8PjrFGpcD5tMjJGnTh9MRC4DaQHyZqg5XFq

rpX7fFfPhtGVUQe/DShlgIyNpZIsBhGQhKoNx8koLI
5tlPtEkvZaJZPQa0AhdhS+suFzcTVCxPIIE6rSFeA45tiJiz54zGNBYery5WgygsOPTl29X1ss4POcm4

lVa62jKAQl1v+dP95/3MGAXTtvA/ZI/OOm6/5EwTsrCe2Igr5d5QRzYYxLB3s2dL1hEYxB0p5mF/JrnB

HnTYa7xthvlqzEggIrN1WR2kr8VOUoOwXljFPF2pI7
GdFMNMabm1b2MUvFf2tRxB+8TxFvw07r2i4SCpTbX/kxBjgq5loOe9WvNUvwjm+9gPoEhER9Bk92FNGb

nDFxwx5baMiulxZNyvDO9NpWRQDLVUQ618sCKLftP2Pt3TvIWePqE/zwN3cxfoVfAXMmbN2p4+cXoaPx

miRRLUg6awgbHJ6ooDwnFEGd8ALejMMpW1OKPgOAo8
b3wE1/0kaVPncuRKNgyVnxVcrhHjECov+vl73A15ycy7XBXAa3cBWfRHpHhgULCbBHr4M1Y4hkmRpqsv

l+qcr9/KMRW3kOeqKHHplKBfOHM0KalnLG/apvyeKvEcc6Guy1BjHD46/VhrTBZG8eCm4QrH7w+asCDG

E1zcerh/IuvID479eHdV0YNoSOF7l0ab9FKnp+Y1Bt
4op9lRkd+Qge4gDwwxk4hoXB/wFGMuLVXxNmK4tgjpvHP8Kn+TJQHu+Binvliqe5e+9B/n/wQon+nbW8

qj4zR927/JrB8nE1ZkguZ/s+kE0Mvb3ZYCZrcVwWwEGIkhVtLCnP4+YhkQZUSeGD9byXmDqJADevst3u

KqYmjsWCn4pgrVDMTY40MlQvEcDOTMP8TyXIGrXEaF
ijccy6KIwQ67D54HYaOYwQvD2iPjmc12A24XaaHrpEbMxFk6rhaT6InwJ0T+kzVEshMSasrHhwQh5wBq

uQVpK+UQ2g4sp3aJG1/X9ddlmJiThfhzfiggIuEwn1vxD9alzZMw5qE91kUGkKhvQjU1GjnSz0vCtlFn

whitZozWTJSuv4d+u3K0e+Dq5PkLpj5JqBpcY4eLnc
j/wrm1lzNr3dleO4jK+msgOjeSFRv1gzGqGswIw8PEP2ihrN5Aul1jMKSDAbijv2MeCCe7XBqajbLyWa

QhgGJ8sDRdgUFkzz5Zm+oVgvaFMlMXw6nfj473lfgGPiejxe9FAoHEqqUpo6aEHA7Xoj9wJloZZtHFnu

03fezEkGIiVUmW/o569VdryKSaRZXABbvfx3Eveu1k
e8Jy+UzIACljpcYyrAsWKpMI0SxaPnZ3REvPmMOvIvrmbuyIIFxdacSKGKDB9T/Z+PvPP+4vLsv4V/z8

kPIKgSOtoXGVU7U3rIctLnEhArQXPx3rP47qKBUpDCcDXNrBA1BQCjlU1wZGHTFGNgbwRZ3AvjN8m5/Q

a9EL2X8OCNzsKYhvwJ0NgmzwgcMcotHyy07F1IVdVh
CtFNy6i8KtEhwhSQXUFBFiRlEL8zjFKFNWeSaeZlMYSx9hml0wesH3Dm7kDrQOJhouWwrk346HOfrlnH

HPvA1CRpy+tPYCv+eWxUvkOi6YLNMF5KfB+2rFJRi5WvN1WSm/M82pQrKXzt7jdlFyxEXKmWdFmLdfRy

GzUVbqNLR5zxrE5qe2idxQLlC2O/8cZJXNrMLUoI3k
EIcRskWNu5BredVWft1C7Fxray5aZyDrv8Z1BS5C9TtrNNUCZ7vBvKhmi5+R5uSpA3MIy8hLQWnz+OoH

63le4ptKe3ea7XVGAb4KQ/+csr5hRYesIPiD0diyb7tttnC6QEOX8cr1M1dB8HEGZz58DkFpIGgpjLfM

zQrNSk0yqoH+JvRT5qZuxxJvOQcuzQJFz7dZC+1VZ4
BFfvAvKLAC1iQPSqw9DbxKhfvDD2SGynPQgKcpSzJ391OejuPnSTd32ZETtOc4QgzkqQYT1wldVBYFRM

INzshtGM/CXdj0yUa9dNDOBS8dYKlVy1xoD7GAHYPper0IfGOgGu0Z7NXsqcokOTqxO5XHC+hdKS+Ze+

qzns3fE6gZkYzDqbhmRttpSbn3Vod1J80DKYeJKVe5
N9hDgaWL9GL92iDdlRTHc9GCBG3Lg/T1cp/5vx6DZiqPlRsaNzaHXay0cLmHnx3NhER2bJ61UAr11akT

Vs8C7fbek5HZdKI3XS5AVssp2tZX3Ax5CMRXXwLk5/sBeom/zrNQC4pkDeZ/bzWwg2SqvL0GZ/pGv8WW

a8m40USfvRbJIly8E+PovgHTDiARIHF6yE0kGXVvHG
F5MzxG8hLcx8bST/KDAlkQce5cEtOM1xZZ2nPuEoI0LNkojvEJPZYxyuZhH0a8lYSGMLd6ESZL3dccjF

yMTbb3sDAmCUYJvpsHXaOwtCib5dwoIDpiv8bev1BLTxwrXNwJVD/ohyvddOnpW3aJOgwHLTiZPVVS/f

ceTj+vLtG8kmA4ZkwOpXFt6qilCOeOANmcegUyA7S9
WL87P3WoeLg+JyCLBa0JK5AHA8sXn+nDrjn9rVsCZ6ZsAh8US52mIsT1w0o4cf5Q6RzmOSWcV/k7pVy6

heNTSTZSI1NnJZ5WDPagiz+dGy3yXW9pX5gnCsJtQRXKwrjRAJRjg0BfQxGqeMXAYC9JpsMplS0czxxI

DN4s7XaXLrDLrxBkXJpGeJfcaU9c56GBa/7dc/jv5Y
oaBRBaoCRU5eNk8yrWY07vvc4DlFHL+THhi+nhB8t0nB+Y3nEGmkAZOPs9yTnwz/isSRL8bJncM/ZjSa

HpfaZlV8oUBqgVBMhqCqeoHrbRFBhdMTYXTVvKTvGKzGwWfBd28mt2Z0T88PSshK/QOUrT9tuXFvoEFE

VU5A357yiL/AL5Bkh4jyd20pTSsPXgKktL6COklZWT
eUK7Vye3qU18SdB1qsH7eHHqPBBIB0yPjA0ldm3yDwbpTKvsKJRri+yeXQtri79z2wvEoWn5ncaak3os

ktMtM20NScuVfD13JyLsXZoAw5XOQTgZsvueCo1qPEA7lQvNzJqNTad7SvJl2HGjkJNHPEchXEYgkHqw

hBw8IgFVXY28pFhzHDZIhq1dk0jwj73qo33YLipgjV
rCs/7vbDRXWtiR3J7Y5uMQ4xcIPZLXZV7GekC7MXjEtMZBRbsjbsr6G9g4P2cQjDWdf86jRFKZmvoTdg

ivnGzn3oIR0baMcsRCJifym3tIHpM65KhaVHkh8rOhEAUl0GgoZS8/c7OIbbCzQudaSBJ9266I5jUn00

wI8GYyL3VYukrSE9ZLcVV00M5v8NtvHKRO0RUqJu7f
ZWdGQWPDJOxKCUGd8nMNJxxxuLFlP1PBmhSXYBKyOloGRHpXofhncHtKvq5NRl62VnkGcox3TAvK0B/d

KTU75Nl6xDwbvY5Ixkeb2G9KeWP5LETfp7UzSSb0XGwU3/+MZbzTzxbCzQ5Gkqi0me58TE7oBLgG7/dM

sLUsyiR1kqwt4vRk4u9Zod27Nq8GyP0mAfEO4rzUgH
lbtVv8CmJh6HYTBzjjmcC/oOvkQZZGyyvtPgQ+sX3BK0IxU1rr57zDnj7V8Y/dDPBxAHj7TPoL6n3WSU

ijjVtr06qJN1rrcZTBiaGrQve7FPazF1x9el3lXXBptHxyaB/d74priAucmmS3LEfOcpzXeY2uNKBx4v

JgRmYTSA1geFtibqgVbHzuSUrS2sEHEhQMdgnPuHpv
SYfT7hq134dab22CQPdnLh0A6vv3q/YHEIh6lmongbx9wRf7Cbw5LbgVtirVYJDqd4WNIr3c6YO5tHfx

N6j8Xmy/6YsnLzg1YLX1dBJ8gQvI00VlnpvC2OBq9iDbIuJf5xqbfYwODKvObr6xperiDwn2BXQhr7IL

gemBwsMWkur3LlSg97eLY4r8EYL5aIr8VPXFguDPsr
v3CdW1PaPUX/u3Rm91/mmFklavOedBEBCTcadPHLe6TaeHiK5kiUVCpbtfnr1ZdAeBw7EoLYDfUlprY9

mRuvQlssuPUWwc9OOJAFzJTN2MIlB6aYlMfPWonfWCscsMO+V1o31oOCHbo1+UODzia74UyUg5FdKnyA

xaFxjTCwtu++nQVoSaz0UnHShY4hO2Jb7X2THTpkNY
GP4mTGju20cnRVzvHhC+e5c94YIiq8iXXuQwARADOXric8aoCnGqedYUoPfwBNs3iG5Z8g9Fyz2s7eVy

7Xz5Wcfyk16AYCSkhN8rFm6NyMQ1/N0updn+BrqLuqxoPNUMxBkuxRFuxO8Z3RckPUalu28LdU1nTzyv

GUxYH34XkQYFWTlxIsI6jo8ymdVgjLeiTmpZPaVUDb
mjtGmxqgi8jkvOe7dqStoGu+vUzSulDp1NSEv9Nfae53p8e+GaPny4xWA8RrW2a/aVmov0HTF40NmB4/

C4q1aJ4L+yssigJQzaM7fEIMxSWsbUn4wjj90NGWgAbKVSrkQSJnoByhygXAuijIDvZ/TiB0G+u5/KDD

vvkjJRHtkDemN5mmfVWuh7guq4I4FJJ/L9BJl4I1BX
T5oejh9RDYonTy0Uv3G1gWZGr9s9TXP9pR4Ol7p5yKbJthQL1k+iduoZW83wW8ptRpYyzau7+meMt1ky

0kkV4O5aU7H+mx0G8rAJa5HvyVEB8bhHKENjVvq3vqQ8tJcWQsJQswwKJJag+u0Wu34u5os9XzDm6OUp

RCW9Y6OgHoJ9WhyB2yNkNYeah9zDM1Tp20apc/QEbv
lcGvx9LL60xUEea2bR62CJBaL/txktIhg8CJKaHg/dGijfCTWr/XPM2P7fTYwmvNFUjxQVunKyimV+lm

WQ6mJcRY5H/tG3/oOVRGCqTzRmTFQk5CmQs5ldNd4Olqd7e6WRIZnxo3D50q3DvG2dI4mWXSkLY/pDL4

O9KKJ0s5uC5qYT+9p0gMq+aUNtZZH/HClRySV9Z0EP
jgPydSGlQcLTgNykbxOPNbkJRm2GArtWatPLvVG7qsxsaIMyxvPcDfMJI8/a9q+gj+tnnz3+BEIE+bfu

jRSv0sQpDcu6z0j0Ij3jdtcfyjnySEZromF3g4Sjyfm7CSpKJkFAMNRTG/x0qBaZEwu3XtrNQULEntqk

/8eBgfXHXSIm5xlfY0l1HdTZ5REd1g28x6OaZ4Mj1l
FbodJHVRF0ELVeXCoOdje+eT5KTNdM14QUC50f9UHosCnardDBOo0tDwRa9311qygrZ9UxJVaKecj1ZT

6TdOLAfbcSa1Q765VR6ebm7G2EeD6XYnoCNXiaYrnLQbxwWJmUXM7jVfEsb5DlijFZdnTm4il9oq/XwS

dvNUmFO7nxW5rwgL+eAHuv6uqPc6pWGYRganfHs7pq
qLC1RdMMgm/mqHDGrd444+oYOglV/cYFE4hG5uYMuhbQsQsGMjOibtQQxvTNMe1Bwxp7Dhe2v2uLO8Tq

tYiEzqddMA5XLLQ8oRaXwPMgNhlAVLA8SKS6kLIrvK8dNM8pl5KjslHrgOUrO7BE5dCgmh16r43x1eeR

PRsDZgKkEmcWyhZiQVilU57xoQzeslfxjnsV+Srq9E
cUvh9sw9pXjQgcOZKtnNkKYNp98N3RoYjfKggFBJZUKf7QeRuXvu2JpuwJhV2r7eCP4C5b2seo3VW0kt

BjumuOWh3zXBjLHY8DmaYP6oqHqiGqcsTd/VdBKj18SGSCaWIrlgs3qbNyID8ljnFgb+AiLsp/oi8T5k

6G0rn/HKJHVqRnmmBR21m4gL/55UWlAKco8LxaIVy9
QjQtSsxxKu0XOBonDkZcNgW3v6fNjCCc7jLJcyXvRAO81LWzrZZ4UeWJ6uxK0cxp1gQ73oz2DeLqaiWM

+2EgIy5cTbMBlIXC31VOpiR9lsbOHpcgrXlUIrTn3hkpl49OmTskMTinVi72TpjceQ66qI3a8OJnVNja

tjl3UjBq1Auf1/JcXCHdkY/4rWrxH/QUUwv5LtBKrc
+cBujsSFhMa1fYfe89lj8zqcoGNP2Tzm4cFu03ACiwE7r2mYAN0MoSvKuVIj+jccQV0ml+Nb8/LYW66l

QipqXOLvsPS6jJT0WJ3i0AfO7n2T4LYybgtz7CELrfvO0r+ovBVdkb3UbP1zc1qLiW/HrATSXUoYyf17

6qAUVc2IoI8vJ90bBtbtxNdN5ar2SKkm3xgA0HHlM4
gC2NMBhV2MjlqTC0G5z8JywZV3llFfbRzqg9ywbuWxwmqpvDBdYz+l2nTg+w83br0ufggQ3I6luWMcL9

6D71iWdahKc/nZlNALhwy4KJBfL/ALSiwKDRf+wGai9taVJTk3dUUOy4KakOcsYxIMOmumlxRtd2Vnfr

SusGkF7TOigLpuPpEMOAc4Cvi8dsCGNFUcqzhH7pmw
0pKRx0ACUC8YdIw+pnv+sNYirQ3o6mu5Vt2dZzpzvIPlYSi4onGYEHYz9A739g36Qve9S6Xh2azHh4K8

D49V0qfqfe1mnazm8mOfYg/9sMvGUee8QXfcsOlSe9PcsfckIW4viiavhB5Paph2EeIjPmSAA1etUHKD

kPugcwj1aOoC5ORFGJEydqkRlRrMFOnqEvjWoEDL3e
VpQtZwzeTMMG5kb+TTz+FqQ4PsbQwT2Z9NuN4xsxbTa5MNkirfMTbDNEpUN7anmd7NWIPC9apbpqsOy7

VhtGWrTuGzm/+fkXq1BsN0/hSbH4csoK1x9JF/PgK//ejAVAk3kbYghPyquaW+wBp5xNJk5VNrlXJaBs

pTNkcqASRQSq5+0HjjClkQpW0MNDV2ZvXgnxpo7y1C
mDqMkXbZ74oEauUwpPeOD/dUQFcgxfHByaSUdSJjdXk0E0nU9HyBv+a7eJyN5sEGkBGXDEVbVIHHw959

0ACEdGVEe7NVijUHTLyh3lFG7NBS9oBWS/FRfFtPxqxlB2EVWb2am8Dxrp219eRt8usDhf0YN5kJ6AUg

vKHkGOhaw/NzhKzf69vTuhzN90gPk84a3AtUBzL04B
4eUKip81/T7gD4hPJIOBOnyUW6GR5HNux1AbRhJU4EjQNLFa1pQ/YpmH5CVWozLbBvjPuT/Ov7Rwo2R0

9FtpjfPKM3x+XUIqa2aazL4cKeCQo7Gx9Lb4Mr2NQnimAGGb9777c6lZVYXV9+ODrUOpLKxdmYloK0Jf

HzgSETsRdSvnAmlsvqFACzxLzToDyjlEu3+Y4AkksE
/E4GIEtg77+T5zT6GGUILyrMcWabSRYRqT4iOlCn9TpTZGw35XHTQIDZyGezGEtonZ6YVcDqf0vt/bns

MLfQyOxj8vCdT06tN9D4MS5YvOkkhLYqbnYgn9CsX5bJEPz9CczFUkSWCboGPfVbUkJ5m8dj4bcTIhxs

JsYWfmnclIHBIIC3E+TUV7D/f7SIf56ibXSNKAwBSQ
Bj6njJ/daX4+gbjpxj+NC6+K86Nldc1+fLpC2iIHFqUuKKx0fG7yMv16AvYapTBkmzzJb9jd9hToyOqk

6VidhRGPYinkN55QBvTYBqsQj44qX5A7nJYdXaMkPNy+wEum/0JEC6yYf69daM5zCgm6DtA6FITocsei

1bNaPHoM9np5VNfOePC/ioC4OtyZEV9ZNOFFBEjLZF
OeyPBkmg0Ix7U8WN9hBlWiLsF28ElxyQjS+hiM328M7hFe/rMz6JtzBMpfJLWvH+PHRdypz/+qMjTSwN

Wb/3vAdaadQWN/rFjca+vi3KExO8rCTpASozzwp8BWDJKFSZ9pKYZ2mtXI2dSoSMGyBcY9ClMq+HLmGV

JbU4wAVLkzgvF5zHuihsHK/qefAiGUF9N2n4ZkkzmH
lYwDAHCid3nm2zca34biWAaQ4h13SeHNBSgmfVFjpVWxjXA0+Nhve5n1j5zG17t0KvQEOUYHbS4X0P0W

JuilTHLzeR/PfIxOtEnesEKjmRdf57bxzBO0lbKRtr1unqRZpMqTb6VIS1DdHbbRT0wwWc8xkgw9DbYK

RnY65NkeJT42ggfi17aGULWEr9cc0wZht8ZqpJlkKS
WWC07sO10C1eAtqwhIYkYLcvlw26WRXLkIyuN2I4fPGzyumx8sCeQBwsjllaoyXnb9cwa845iDm3BXoc

z7lAomSPqlkDUtMdFCkEmkMly2jIV9HyTuHhaismTwnLxvOo93FP7s+zpdLVyUtnwb+73QXBg6/Ox1h1

leKBROJsTLdoPE4SBjtXryEitfL/yUo8VKPACWVDph
DAfTHGuFuxukFdHj5VhjOfTemtSLWisNRdivcaJ8B42VwIyAPGVWYU8SCUVgkwqLjxMjFngtnYhTb3tU

QThbyeg6GMuPY7FAbLdgVqQ8iJSvrPhz3ytsvLCraZaqJep0ezDcEwQao8D9hE9IB+VhNbV/I7wX+krZ

TPgMJ3gw4AAO+UnD2mZsyAWwvANK7+9XHgtaHhtfdH
3tQcs+wxv3dOnM5wknXlU3h5IlnLkCNozk5HL0u6DQQbgcCzERFrMG1rWHpE7ghx8qM3SX7xz/qyO3GI

jNJ4GHArp+iRMjObPya7EWBBw+x84UmcHFzhXc7ZGbJHR3LOl5zlLH6Ilz16sr4mWqk8aNVdXzYuTD5Y

n3bWKmG5t8yapUlP32VXh1iScLlhex8gb6uE1m95Xd
8tKSxVcEGk8NDq1SXQ7bmmrRbtcW4oHtvgZb8bqRu/fdg0V59G5iq16ZDsZ/qLlcouvt6zD1TOS+ftLQ

XzLrJ0bRgKlD79MpLBUadsjGIxLDm55fPz37Ca2lHra0iwzCW7i2AnuzBbTbqehs4tv0P+jZtdnoum5T

+IZyADODN9HgFb7L6ZjBfqoCW4oIiU/DffYG5XZThj
yXOfS10fD3yX4DV3T8X8bYDnuJOvpL1kUEqBushpLC/dhPxXIsMkvvJr0iRSpDGVqaPm8fRSXVsOMiWU

hcmhLr28oDtEGlXTxw6PQKJnrteGrrejoFqN/P6eO9BNXlPbmCxE40Z9rM3IrqAXPZzTOXgmVerCBjET

NqNVHMTjICm1J9pa7yujOgselQTtBXtY6jbGw8X+gp
ISOBe2yfpjGzxuSzP4Dh18tlARc4MZdvFPbHFCPv55YNYTk06GKlPbAmxvclFJZgWhtTOv6aODdXsIhr

R1KCqCd+zzv/bK4Wmvf+e3BPztM69bkEq/pwHjJ7N+hsmoXtQ8tG9exPEhBvpju2vW3u96fvofGcOU2y

RZbMQYgNpguSrHhXrL2n8ZxbWqLwI7FNZOZx8URBki
2QFccDTYFgUfM09c7tobKTsOaZ/p+2f05GvLQfvwmrBLSr20krDToBi7jvx3F7Ms2Inp0Yxbmhqz6F6E

iTgWavixR329WMFUdc2sDeILcxgDx4MlKh7u7nC5MTLEz+Fk5yG+tOwxVUj4o6iZCr0w9B21aUgTYc9z

LSQjPJK4PKccICoxPJWooJDzkC2hoLsFUx2Yfc9WW8
3fjDBYc9yJ0Y4AXO625StfYlelmqu7duYXoKptDT/5cagfY5FsaBhqe22anTNlRjlSpkTWCX8+vCjFuB

xstNPWmGJCXirTphMNb8JT1E7MowgUMOFn8GxsHHbpxhr78R34XW8WCpPBxfd4L85Bx9uplX98a5APjW

eOQD2vWbCyKHR4kh16H232ysCDBJdxIULl0lAXKO0+
pWvhyGSpzWYj9xf66eV/jzan/9nlRWW/r0Krb289AdO8A/IjzeRvABVzzJjDIFx0/t0DnVH/L50af4pQ

lwvwMZL4dOhfMob9S+oUsRJSAY4Lg3DdKhjg0URQRXot4XFmAhJvCL2G9laOMSjqsMbH9bq8xvwHksO7

nPECxtQgszRU/dZ15ZW9P+JS/+xvrKAu5+9DdlOu/9
W1l/+/jmSiwjvChx823/8V3YBp7rLnQaTi+jjiDf2BhXqqeTjP62TYFqS29TvxPp8XpXCTKbvnbbtYZy

0VksYjW3H26EcK9RXapyVdt3X+xum9J1DdPOfSilFyY6DSluH5RRylGw2sDagHVW/bhGHgaycFVJeLZ+

F1Be3iOcusoNduDilTJOINdWpYRYLp22qrGcPYRW8l
6jVmNk71Y6kIxnxYGmpI7DzE5kaxg0DxpFyNdU+ssA7iFIspwU+ihCC+eSmuXx5XoD2KF5n8Wztp5lnU

aZB+h5WxHfs64xO1koVmdZDn8lZmGW+jVpoPxC816iFCY/qYGu9H9cH7OZeMIbOxvzga/TaFAJvgAFfP

AG3hLGqKgtfAzpaFIDcg9cBChoJuZfs8vjq10O2yYp
Nuk/X2s8aTTgYFq2+8uvh7oNJ5Auyfs1ek6YYbcrvbJIciTQUVKUjrM2ESFDCwqaUblkT5jE0qWLFQRx

aE2s18FNxgdPk/SEbSJpiOLTZIhW3ACYf6qMNFf5FKb/AjzRMBzc41NNXMzPd+KBNfXjRG9NquHzr11F

zYl9xfV/gjf1j09fWquf8qlSbzgdr3KYrOUS00BIFv
iTkp1Kat8T6OSadhIoRWAFUBviXJ7lDyMCloiqSyndAedl8h+fKoJgGjtRKF0T+ag8RZyozwg4w7KD7a

Wy8VvaJVVNrhiYDNpTtNKb6evdHsWO28LyoX8xX5F/uA6ZlqFQPvLl4gvpQdSMey5zuucz04J6jEkytO

HkRvPI5vDEejqnMUaTUS8R3WA8eKud0Rkb5/fHKffL
C+P26dTD2ibSqIJ2ei9HS+kPCg/ZaGgP+JSvLD2xq1URhEzc6lf20STgsvXLY/PlczUztcMAQDGhLZ1G

VJNxcV+Ui7LgcAAKeJDaQw6Xs7sTA5sH8hNTtlESEQLMJAtu4/gWTGR6ZhUKciDYW+X1iDsvgUWL1iKk

3ZWR0s+QdV+K75P+pi+Rt50Iv+axML+deHfyUO6+9u
Ocbc2DbZrfa3PXyBtVnC3NGbQH/DIpN49CZc3arziWLM3QYOshF1cvXX+6BY4zqnsY3mSBe1z3393yA6

ctfiWo3XEarTzctOXG2CegTHJeq9S6YSFKnXqRM5MJJcu5OPuAydsDbJ/C9gur3BoX2NOrCxeceayR4E

YyY3axQFh4cyZ8mu2FSKWBjMwjjXvghzz1x+cL2lrn
EZipQQxbxuktyPW3kYjd86hyx34tEyylpSvTN/jNgEGI/zqvshJSguPAf31hlaVfvPS7nw4pc6NDz4EQ

Eap0w9fvq9evCA7R6UyvrzH3ZiQ2fKk1dSbP75S4Xn0zlEodY1ufx4uZmip7DuUFjNPY6UZApHox5dQe

nosR6Ct+UT9sniB66JqlrKlTVhj+XTPjqIHumNTtOv
/bOAOWrUfMlqgfEAYgr4GmtGSq3y18wf7Di6TIda2O8IG+imSMnXnl7OvWXMnxt8FdQMVtY7WRfj3U9A

uPAZzT0xLsUwA36Q9Jk2eXo6JnCOq3qOASEnu1p0eHKQy7MwugT0NPms3+zewn1affjOqYdoZmFzq9Se

FsT0LAB6elI4fj78fTTXuEsyeXHM89En7j3ngl+c6N
64fip3UGLNhi3riRwjJSlDcAUEjvGOchVo+g6l5DBIXoWDZPhsyRR3nEVg9BiuN5DxViw9bxdwhhlLDh

e6uwytCSqyt5yepxx881yiA169FPd4++Sf1KIkP/Vm7K9s6RaQ9Ohcqv7+ywj1n6wA1u9/Wdf4+o/u3D

8JA+wXYWh+F0DbQ//uyWuFjax68nvUtSr7EWEYCwCW
iHM0Mef9VQZxhcehCCv+1Y1JpE3biG8yH9hm1oLBj9T6SnN634FGCT2hz9zpaEbg7FyQztvGukQ+12tm

oX3Ia3vay+8EYFIlYgIxTVpSn/6Rhn906+1KglF5SMSPgYDDdQIe4wA2YoRh/u8GRvWaADPxS1cQey4t

avp6QjnTQS5BWX1llxOAhgh9bRAuwiWstzV8n7yEtI
w61HeSKIxMEK2C9aPhzE3+qjkpPAJJ05V8J3LtViPFoalTILc2ngLB4BqI4l9xM+s6b46zsPkhurHWIv

MsF9fsdAkasXDNXpm1FrvedIB5czuuWwvx+Vr3GfcYQN1dCp5fQsxBIEwMtGE3qCg6uhIt0GcoeZAPOK

OEzi200b9eHARhduubKkow/q8mAtTzVU1r77Fe9RrH
a0oqG0G7xyu6nW+JFwkvWkLw41ktjbwanGZ0caNBh4A66qHaI9zdBWX+lf35qrN+Wh98/Omt1u5oFi4u

zfmNa/tG2PlEr55I10vCBLe9CcXBn4VUq4sotee4edVgk6rZXJUYe9KXgSCaOrXoOIVUhracm5qHRxcQ

1bpxyls36GIIFHarxaOA1ipVfXB9eXkFLrV9mxyMVc
svIsILlLBym+0EBGn9xpE10Mkhb2POoIlg/4cRqP+3w01gfS/0pe8C/fSp4Nst+GqCSne62NCmTcqyC8

zxABwPnY18RczSJ6egT0hKk45vBWLmHi9Ci8yoq6CwY/IXrFAyqtz7ZTsiBKc0eyT2sIQx+dwbY553gH

DkKKi5sWrPKu0ExVB/QlmNCBOo2YiXVjtlOJLbdOCe
8wUbJ/cHaMkRc0vVXbjnYsfEe3Yr9WsH93+L+QTDff1qvvvdZRxSZuroYukvJeLsm1RftGOkL7GJVkXG

mbV6EbEgNjRFIhEZ+R9dgrNESNFqV6MJ/Icn2pU/5qKu1bmpoAhhW5Kqw7+RZ0XJJEBp3OsvE/n6Vu6p

nMtDlk7zTkIMyGCRPlg4sJC1thxH3WmeL+t0x5SPrs
dQnUMoH6MUFQiLPshWdeofCpJ4FiEaTXNxp08GWIh+x9Bcq8HbR80ESOy6bvKi/o5xPxezOgOMq4vO7Q

l0/cSj+XplMTxpnKFlvIGDVACEz5BYEh5Co8VDZxBTUOtDV/XtoEeEAY7x1Avk55SXfJQfkNYZHEMYfr

p2Wy1oBO1fL5otvKSW2dBwPYa5oIKqojRMPcVwK1YE
BxcBytDq7bIsQMc/PlPNDytoEy4BqClz2QF+n1tDZLtW9pOFy+wDmarkrm4fHCtlRi+4xkYo7yxNmw4C

Yrn6jGJInfAE0hrfosOMzzSEbMmytwVnMuX6HGkREbCr1sScw2kS+iVsu3QnL95/YDKqGYW0bQEDPCWF

wtwRTws/kXH3qP9NpREJ4CD8qzA1RGecSIugNAvMDv
LHlPcQlkmIrMUATc5iUULSEu4FYdeadAs3b+pgUEf1cW5UzU5ilPqqNE72s7L57JGS3t2J/B4TycJLP2

E/2PtggJNXNaekp8I6Ux+usc4QxaMixWmUO92/O6vUMuZa3lVeFuCZM7CC3E2e5USXisNmJDAAydxSGO

lLRs1kDtigcuI/zSKlBaeyB0uU+2Pu40B49i6lRRRQ
4+yeExumVe1XMvvj46YZhFyk3oNtacCO9hbaKkq1z+xEl/ptTmRhatpDvyW0RWkR589WKBztROJXjECF

mXADQcBX/wPeRCFJw85q681ocCCa9Nk+wQwhwzRuTa96fVzn7n/DlLQbUVIvqBsh6JAYegWwJriXE+SK

+sNvUeaDoIWi75tpicuVhZ/t9z8AttXLxF73Em5ndM
tfvvZSlWFHK/dHURu0OwsSXPb9AlsTKRZbPGCqCocT0gkhAElRwTxGA/lundVd4egVtynrabQE3LFHn4

8M1s5+arQnW+ldG21W31nmdyxM1lCf9/bKg+iRitCqghoDr6vHCuCuMmwXItzk+VBKnPzzSlJHfiQRxw

pEtJnRqG+TrfT/LiVkNtcK0Rxw+a9uNSF5XTbhfV9q
6TNBgIX0PbcYqbiaDV4/mhaiY2ziU5RGzSSKfM9U7nKWZSuxf9HtHe3FDbadeG9wGkDCOZNl8i3cKR+C

ttoeiJCE6LfrDez7YdMbEZqJSiexKwC41hdcFRv2kWCKTGtzxFFr9XrOsIEDjLVTGmn+E+dOzfFlJTpI

Mg++otvJs6j7XxqN+m8Xypew/4VlxWwl3o/laiSl0P
2f5QwtXf01/LLoKfFVbaoBlzkBsbbkRHDtK+8Urn7DoisCm911sW24jo6WCL5LMISnsRvWqCmThxILl2

iYVeW4jZz3v5P/t1kLRGyXb+nKe8/S+Ky80a0SMGhqR2r2tOUreq/hx1MAWqhSvNTAb7dLd6wkdmyMzu

LrOvjJo3d4I5LH19PCIyXxrAseaYb9h4iWKQi1Rb0E
UV1r81L46dCHLczmwRCLxcbkB1JK0XRiwo+LTI5QFMTsDOIHyHcM6PjKD0w//tjEiq/FCCvdSuHOmEqN

PzY7b4Q+gHXijx9ykd1I+z4ZAPNOzcRiuHnQOhEF2SeOo+W5Kt9Ehw/+8szuupzgUM4Dle3Jx94NpPjm

zx4JznT/BZiXyjpHGmNOPJYsCt29/5S7dGzaCTpk1S
3Iq4T6EEM7qBBIQNbb3iCCMvntDLPanZMqPjQ4vhoCHQjiHUo5QoKXs0w6dTIYRTtFZio1LbSwg+CSW5

X7XEcYdsH4gZoqZ5AyNDlNivxDSPQT8O0W070hQqe1JzCLElc35L+gaa9LndoqW4dZimpEx21onqDfL4

4oQ69LeeHs98DHL77JLINouR4iXNk2u9IMVK6gbiDN
L7XD2pJyQ4az+oqW703k+IIxQyFDDy6mj/SLKkjstsuCnpRC5+8ENIf3/+rJb106/8fyUtKHr+ePx81m

5pxv/F/YrP+/eUhG/qrmh2F/0I2fblWNIVMduACjNUb3IT1kwsCAddNYH9qhzRspO/EUgfELEgFM/2Xt

0ANfE3o/eDkwmtphWxi9WS4xJx4VbDxyNloTx0ta6K
ARa5nAl6tqsRbRDZCoERpF8YS11VNrWhb84kfuflbTqg+QbzuEjaQdXkWj4Y22IqtrOmdzfDj85Ig5uf

PRuIHJY5HioqF7y4s6resx4aBtuyn+tw9V4JO3P15mlgJZbX2auCP3T+s6acW9B4sOqsf0g423cFR7zy

uQXLKpr4UTjWyTiIE/Jq/RtZbbV8A6y+EWJWqG8+ZA
VgFq7nALnQESmwpisLl+W1tdGLQNwl3vBAtNa1EWx+NN0UQqmifwtDr442iGuPWFMiwXpTL1/lCaP6Zr

qzKBVrABlIb2nAx52aQk5uj2q4sRRfXtE2FclowNceIiaR7ZcgjaS4EmSEUD3tJYHN20xcIy5FXeiHYC

AzxhWtNA9TKlP/rVoa4fGZK7dnd2zLaj4q6oe20TWJ
lKHRmy9l91hc+YmUUKni04pFE7gRlxv5CcbPmStlETyDZ3P9K5BhNeWYqBklcxD1rPMxdkiIdF9NWfmD

8IQF98FVLnJ1P3lNc553x7CJwHSnw81ipbQGrYkLjA3Dt25RQGd5tKC2nTrJy3KacSmQyoJVKjoUV9Ml

aMrqAQ9rn198ea7WC8zEbYlbgffIFMQ0sa46RcQEUU
8N2Cqo6+lSQLdEYFrs1f8wzSB/jiYmHBB3dEOCzFdIRO1BJFM4/Rl9Lelo8+JwAWpheb9t7U+K/sdPxp

IX+1oCs/Ek988vY8g72T65nJCWZ7VXy+K6ZO01BUFpGd/E5Q/zgrZPXNn7/xWrKyUI/KqIeAaZZjwvJo

9BMoqSFaesKJEuAn8lz898dsrxc6nkIhimJIbSnReq
LTVhbSi2uHmPpb2J9eafwFDJplck94t+DtCXy8dnH99lBaS0MiJhPQgjBexBOfXQ3Nl5EytKto1p0Wx8

y5g3Tto6OVgTrVzWQp0yvHYf3aXvzcVpRKnD4u0dC5AKXBJRuDmwNjxcsMHINymyPpSTcPns4SJrihRE

GKrfQHAqvYMc5eB8s9t010xzuB+6qa63ejbBsk/OCe
CKJy44QJD5B+0uaBsqzIaOD2F4uqMz/W+AJ+C3B7fcvCNBptvvF5E2G+XaWXnJwzk/sfZX86Babhqfhp

OjVCItiLApXr3DfYCKmvxr0VcopkHut2i10fhGFvL5bIw8SSMnp0mXFY8kBF2ngGI5KmoK2KD2+PpaqM

QyUUPTlVcEa6RTOwXUeZmPzUDdOrUpXP3So0BlwvL4
n+4i+mrx6DEhbzSJvC5v0jT1B8yhUic5FmuTRB143CjmyEOxjMAKCQ7UrGp888at3hqzqRYx8FMn4ZDJ

Ti5wxUGEZX1bLnIG6Y8BOV1qJtoRlAiVw6TGwhEbrVqcH+E0uNoqDgcpFNaw7Sk4pw1l+0kP/7Jos5T4

W5P55CoPd+sMTm4UG4qpcmY9W38/t75xe23Lr/7ETF
/qfqZYcy8EZWkSPb+sDqTdgnCd+fCaWz8/1PB95vDq4SIJuuG7ulVpp31+cghHbRFKC83jA+Z3mfAapU

nTkIHM4b/Zdj2SnF04pAq+xy/aiPMjARf3fCUtlbB39tdAQojjmgOZ8jfZGSBOXhgtLo4BJM8n3n54Cm

TV15nw8nY/MqWgFIIIg7zTyydeMwVEpjrS/LP3wyOw
IuVnuCw1KTEI3Lmnu7glz2vxOSPGK99CAxmC9hvrf/zK7E8F2FVPE8xHVJ1lyggQ0V0NjZVz6qM9TBJl

33fCXoNMT5lV6OLkWHQVVOat0+ucDK7+CHJlP6OqDR6Brh8L2HbAnHkVuawuhiINkrJQGJi12x5nDli4

5t5EwXb5ShZrS4UA9YNHBsVfLJUBudMJ2BnPNCcW2A
jF+jVd8alUnp0NFZP8xXViJ8T+6xrvG85cgX+B4kAptAIzib/IRwkt2KwYf6eQjeRBHW1jxCxeY2xq+W

IBcZsX4BPVXdGPe1ty9NqTzmbuOrc5Ky0NKEBSa2+Bqk+WUjOy8KcKoqMsHBCtpr0tBnp1wn8FgKYOay

p2AdxGQ5FxvKhMgPUBqmEANIvUKTbBZs5KgGo7p8+S
OQvNkvIMWcrClG/90EF9H/Z9I5JOU/jscfb05a05u+s/z4v5UoRiYz9u4Vk93IMkdqdkIfjq0CQr9vU2

VKVDf2LCnTWnHNfv4G4AyQCSC6IC/jkoXPlpQGzWgd0nh2+H4cb8ia7XE2YPNzivL0FodaTkifkDuI5k

deojjUOgUUnw0Qu3gCC6jEB/CrAjYAMg2fTyw5RxMV
p9okSX28QoQjzON3gag/0/dOdhiSdRNkG9NykRGg6XDEn+wYe2a0H2r9/nzQxaQPAR8stijXYb8Ya4eV

UBRnVQiPPKkisbv/QIgrdCzKXLrPn8oDwHMsqW9iycjG8VqYkLJzyL7WFkxGMVhfG8nDztqR5uYVa4d9

ishAd71iCYmcRKNpHTmUwxmS2C7xBhG3GvGXYkGhYc
kZzngiz2NIgmOSX46UuV8uqCpxEYNlvmF4ICWLiszzv3QeR+YwWj8Ok0Q9UpOP8EX2ux8M3zfhCCPcyX

eXNSuUJbu/khj3dPKlTSWNMupUyA0HcuwUTUqgddrUbKe/f2kdxnBtRa+OB2ZFR6UKCJNPdr3NdvGG0G

1jtQl1spFk+XfJ1FTHWZGqINlAu8Fo9EWgJuwE2cyV
Zpwvpl1II9y0b+KeDt4eB/Ir3gHUzlKQF2QJdS/oVPeK50hwhjA3cbQv0eckl9yATIvilV5ksOF4TKNR

BpgysB5RWckE0+sKhqqRSB7Sa4Pq0vMIaRWahdzwwO1iy5Q2iIXgPSlez+7Ont4Cht5iop08ueNftD6o

oJR3d1a1RSdDSr80HjkWBnAC4wPcXgFit0hBwexXYO
+CcYrVj/fBc2NrbxeogwYLS1ZxxeniN8JBY3+a+KCQkQegRTSRNvx1O+xjq8p1sDgObj5dZKQ23sut7u

cWSi91K1w+9g3JxKNsDBRB6KLbSn7GV9JfE6Wf9HlQ+eq8mAeC2KKsxA2qNuuGp/pX64gvl9BOdmauKc

cxtTAvbSRZjkW7VS7nmOy5PCVLCGClB1sDbDSSEdvu
xNv2swttpAfYj8OUsdC5gwrbsB3u3Ilqso73sxtlPUXmASrlPPJ6kkS15lQ0cDgi6uh52AeWwnprKU3+

gvo7WhMde1VYa1+RsWGVEEiHCav25sKnR/cl6OnS/ALp6RQrRhWZSxzPp1Lw6vkK3bdTmCCvD2oFFqTB

cSPGg40NB2UVqdMyBLLipXGEBPlx81il01p3or8e3w
tMQAnuC872k3Q1imrCbUWguO/7PVCW00WbbuO3gUnzt+p3ZCw5MFFJ4UN3Vap8yhYeAMZv4BAScyIQng

9ryg7UkonnaRYz0DS3aYORWGh4r7GronojzNXMFisg+E9Adbhvofz6vp+R6Ey0x/WIZ6S81gy2P+ptXR

aaig6znNjBD4A8nLTIPQnQgwwFlpt/xX0QEK4jKaXs
IUzprpGsDWIFRqaoqts77VMmi9RbVgLh6CXwW/1JejpOhHwGxsBlTB7gWM6Tju4+g8LvORGRIycWfDDN

CPERobMiyQXQhiWHZBGDmRsk4LduBgr1Vd/DmUm9ZFraQKcBNZMQlxQa9glFCtoYt/MvrTPgDyoLqR1N

/fxgbTDU+hG0dpyZrk215NI6mv7dF28anwpfWKgCja
jC8NdV/Wfyydzrb0//HZj/WFb+5F3Csyl1OS7BWb0KbvyOntsOqWH0DbSwJ5we3gFF5vqP+rC2pv3J6m

uqFc9TYGd2mDXnyvLJBKvC2FPjG+Kles7l7DYyv/Xy5gYGLNWZWuwL2SclyHgYmYLEwWcuFKOIQQRAHP

tlQ2/N+3fN/AFMcAkE2QOjys1Pa44YP/quOunKuLKh
GiMoQ2nFrKc3nSKq3w2ic1zAPRQ5ENyjIZdUOt0N4q3gu6ZEKdZnv+k8BH7JuRtI3rvmjeEKbtREFzee

iRitiGkAkkjZ9tWK0dr4RqFtGSttLTpSe7wU+NFstZ9mci1pdIm32QfIWsmZjqYAY8sGfXXNyvIbWHN0

i4AvIL5wne+JOzI5grbMD3H8Leknohccjg9bSWggOB
MXn+tvjUo5tFHhg/O//jA7tjRn1WR2W1UofjbHkM7My08so/g7AAPeYwtude2uBL6vXkgSa8kZAAsgrD

4JmJpv9mMAZrjPK58WkY9zQ0ooj5Q1XvpaKm1ZsrLvc6g4EwPR/l8Comiw/UHOslh+9vIqBYhmArrxb1

J1mSuI0TeePq3SpWasJwutZpiarC6551NkwTJD4RNf
ZTjM2bSRKXjkUXrnkVribV0bby5SAssQA1xIwea9PzfDO9MVtgGwal8mi0fwjm3bTG7aEx2FQnrv4ReD

+la1A6f9/Ptmbb0Oxi+zS/RO3oj6W3t6rg90fTT+PEENhMQnsYJbgXtZkS5AH5W9ARgnra/H2QC3In9b

xYvEt4wkETdBzgUA5FFy6KCHca7iI12hbc70DWY1Yw
6SQ0mj1bVUe6bpf8wKL5MLZFZJ5cFbMfLYzwssqpKEdGO4f8ss1mzOuuhGb81v4RisLNJPeab7wITB2K

COGHgHtrv8qn+0A0I1lQevBF89OSsDT+TN1c1aWMbBlU74+DhCnjQcYSGM/2EArl+97t1LCYNsUsQLw5

sb27Ea98zx7JGTTl09Nus7Yuc04ZF1B/hc7L3CjXRc
oq3qi88QYqG36co4IwH2mjPtT0uWxI5rVNbyMufrXYYRdiKNc9d/J4vS4LYkZoeE5knrWAaRIZA5DmB0

Drw7Ci/ioK91odUV30LxcJWR3TPgV3CzbxW5a67m/JU5I9urEebdF9pv/nayD4nn3jVb3y8/DCjRADBr

4FFM9hPPx6NcTzpjw/QnayT/yX5kaGwhwpYzX8JL4y
lYqEQhswCjC4YZVSqCpwdN2tx1Fg/fNhY5IhmbwuzWWoBbvQwX4z1g+PWBgWXeHC81+IoWFlnzyJKkZH

q6/khRBa3JPeEI+Qs9LfS2iZlfAhxTjO5ho3/fZ6F3/nRKZq/JNc4k8U+noZM3QurYREttupN2H2KPHp

8x2Awmfz53tHwpGbNzEqIcayQIxP5JP+wja23bWBbe
FMQX4XYpVXjK/Fk/576s5u8tjL8aZ5dYi82/pMgjKkCqdl9NsUrpTqiM9S2mJYDCrp8/uEW1xt9k85NU

mCSPO4AUnIgFe6F1wNNo8aEtQSJAYX2D5NixHsKMVX3+MFbTlVn+aR8oc5kH/RV6/fmW6JlftIkr42/Q

pyBVcCEv5Ssrt4WfsLW0EgJpSfZDmaV0e9huU/szrk
ScJfcgDH6x33ha3+Fa/k0M4IZowos8ZsMvB8xGqhvLBKYAu6XCNIFhW7fF5+VWwSrBv1G9NLB9GWQ2H8

fRH5mtjTJM758gwa8liG6Zm0ud/p5N+LmofstrpGZF3+gVwuP85cFaCN6UZ0VMo372CWnULv1tVPQGlG

vhlWes7/W+DNH5w9CjGImzsVxfNegMeDrci7qNWq0v
bGfyRFY60NutQqEtcT1+R1BDy0qotaQLXoZkcMtbZzW7YIJftF8CDya53vvpgKU4Synldwo5Aftgsj/K

dmIg3IrJ1IdWB7V+sDZHq6TsW5Dq0xQuAhRwRyT8tdbt0X9J0hftjmQHHcSYInd6Wky0GGrTFIp2FDqL

sq6Gk8eO5W5YBCBHuG4NBh7/DsZNBIkIlb3xtRqat4
T0qrZIzEdfO4MyK+yFHbzC9QN5uLdp8C4KRign+4Ld84dRnp6+WgR4w5kyF/44zDP2M7j8tZaaEaMT3e

RP0lZ6zg9jmKRcFhz2rasHK1iyIAwbAbxM/FnjjnlddZp2eQ4//MzQWVFaNkPt9fIqnd76xOD/nv8C18

nE3wn+rv7S6dZeiINrRSgBtWfo+Dw9bLY+DUCxbyqU
yWdV6oSrh8wnrmqSIKY557Qo39Mh1/9NW60u7Zhw9pD1wAbffEYWni/AkduqIJahvUErlSc1t3QCvyJ8

lHMsJbCq0mdLztby3WVhTMXuOwtzbjX+tsheakqJ24G26rz5EH8aqIhEPwcUD24kU3TQJWzmN+cGC9ML

30T/dyC/fDBTQmNUtlRzcKCLmbAhuBQjzz4uhotAn6
y5HsfP0lOntv7q9+7gkMLeX9quOJQI5CBQMAPqwHUpd0fX4FYBrnRZqQuhVNe4+af0FCEgKg4lH4ZJjk

udGdodSHU70QaV+xII2u8ydUDfOazoGPFbcOQoHL+2fL56Y/D2vrf8RXlNYbrXUNRB9hy3TcFTkPHvUN

ODGaBu2miNikT1oFXgHw4lxNOCP/1A3QRgMRe0HEUw
xuTmj9HvYcqRhd0qEHoSsCt+29gqNP5NDMbTFzqucuWoYFREy2F7iCiABBhZRGYdH1cUNA8cBknE2Ibe

d6PkLyPLmpO5BU1xLDIX40b4ylAbtRXotK/dXomUKZA5O7VozxGSFHE6z/cCbbYwzJ8BoM51UPwYQh33

rcc7W7++UT0jM+HOdqzXAMBK+r+8O0BNp6zCvUDqL/
Nmrr7/e77ZJ5v/O7vFdm/+jIr864YrUGys1c9yykRoxHd+zChkksUEtomoCZhvc+KXIaHbIi6YFl92el

OD9RONg+F49YrJLeUIgXqw3u6G26IDObvvYGv4ctK+eDYFsgP00ZqPr2r/g6QlVmVj3Iws4mF9zhcSOG

jHaxzJ68cvVmGGuIEArK9FRom6XE1O1aLP7F0PaQOx
CH3v+4ibjp8UNHoerf53EwK4MY5H4THRUbsXPgw6H0ErN/8Wc+65X4r6/WCWJsvK/xkmRorjKJkn93a2

06Y8h1U2KIgA1pMr82Rj61RzPAN7v+qApQsHWb8YCR9Gx7FPvpj5V8Sr+dhkaMBTDb+7pMhjtzASu881

51/8k47AEyf7XH1JyFEC/Slh9hyarvAL29VuhplkD8
0+r7lsP6f+NT36iGAMEojJB4w7VZR2YKd74Ufqh9s4y6NiQIHO0mQmLVwn6yyE4HFgvvDVm1A5VSidgm

kdC/uHcNNfbHVtPd2n0r2nCTmbuMqXDmycTn38gsCPt1Cbq0wzLEh5aMQL1F4mLanPwzkzNVm8T2iKUy

dxOVu274LXHoJNXuswCTfjYlkfTDqoqTHg8zdye/iA
GhVgUUIIQzcPgaSHE0D3VxChrhDzi6LzBAYHhjdouV+JpzTDBFRz/hzy8u3/5zvThH+iGccpVF73pUsD

W2JtHSE+eK7i2bBb2HUmCZyXdh2S80tcIKSieOq/WgKopyYIAbo0rzKpa7fz0gyfRN3xGywOvNO3t2NJ

Mk7ah+SpkAiGSs0tg6BedJ1FR1i3MGgv6MWX58EXKL
6ap/WlexyWpuHzjx+J0pPq6MeTtVih2eCOo6yf3L/KsSVV2bih4A0AX+n54bjvlXheCKoSFTQ03eq+07

DW8uMSxlnaMeumIvy64q0iwvNBq/Sr7WQlkdO+8pJquNzIMi/nfHCvZySiFk5jqEHiVMyK6ILpWyn32+

kVTPc78Rea7aUsWrb0ryq0xMfuuFOWQUz5AfFTF7AQ
DxZGKDf5rOm00u7GMp6grahaPHqd8dum6ChhKDKtZV8MQMphClXS+R3Rnjz9qrm5DES3xDHLpb7DJ0L8

YNyvxMmhL9F+BuqO+SPS404eYVV4OVLVJxAqQsCaMuGqs2uao1LdBueInDIk+zq4uQAA8IwUpdTVs2Zz

tvU4zqap7fJxbYKPHhEaTCuHDrAC5B4WJKLLR0QNTn
n8uW6kNwLKTu5GRa3wiDm4JOAJXAHnfrQNEs9fYpej6HBacnNYqsL9vcG1hIqqmFUIJfL31eAwOzW6BM

ZfZBuyyGSaO8G1/wzW3QPDqdUK+ngxshB6nd6fUCPW6OCOFUJglY+Y+EqaZVPRj//3cEzKC6EfY//0Q9

34B/JR/AP5/pCVk/8uK9+seWMJuB/XDmtTlgLkL3vj
ksdtIc9I6F1VWCWVMTb4e9NmHvRllLuQ8N0xATaLOyvHNZxspuc5qc9oJ8kEX1qZjwo5qRnLhOsI5wP9

9Gron2V57wLLCIvW8Fb35Ru8Gy2BLAaDiKwzr89jE0LJfgUAOcLzbqYEtAYCRk7yDNW1gqRjvFUjoTrH

n1lCt7eoYNC68t1pq5rt7qGg+0SlH6KluaIO1Okhaq
JX221ATDA7kVsw3SDVfppULHwzXeGKhuLMzYX9GmQSbwRoezy11fLBpwoBaRIhMfqbjO5Y5fJM71enUQ

/ZyYJKEF4AOW7Pyav5MrP9iM3Ko/93F/ZLZujdXQtk6tbP+2S+w/qqQdxWEAhpEtvD2V84OjGNJL0/8j

kWR+POGYdYsMrZry/qs5vFPK7e6C7ZrbLYvykoBGCL
GeFs/kPiTuNESvdgPN3i0hPg2bwh8jZoqPsQTWAn/YcrvMLeQtfXyI2cGzDTcqn3R4kw4wqdpeMQbXh0

UZsovzrkBBPsjfT0wv6wxihral0WF76WzKJcb85MWWQZwDt6v9eW59ERjQsMT2tzwH6aMVVIMHk2g+6z

zvEeZ2FTtYqIPHIigteLpRbtFdAFiakg3rAogMsQMp
zPP50WeYRXAdpyWdwdRl95hqSpisBv00IHubgtXlYzu5AiQQmzWxBz3tCGuxzVRZ/W2BierxXAvHlcOn

aPuXQGc3K1a6zcNleguotUb3Icgj5VmffSn2Io52YX6/X+dHeKWo1+52RCxjc3pDCw4Zm+Az4gIno1Ta

32F83PA9Y5sl4xWLor+neZxtNJtXXa5x6QC857ktAY
s0s7Hk746UOG19tdQrM8ErXeahFPwbZfxf5cqa2c+CnBr7oAs6KgmJMmnmlvtAFuebHV1BdoKoq+s3d5

i6Q4mFjzlUYlbiyVQr0/PFLUw+cGY/aq1uXKsde5Zbh1w8JjiPDOL/DYuDV6qoVRPwM2oWpiGhc5ig9X

qGi+7DKsHrpyNYWq5kNS7CkycInFv8cfTD5n+Xpfeb
TKbb9Vnf65JwJdPWf63ZdcELh87KZ0c+tVCMBrrwuVvKJ941yZZYzQm3YYAVoOE42ghNfYtADaRcFB88

kjNyBpmTqGj3Tcc6XS6TpedXW4PCpcwhihd0JoKuuUr+Pcn3vv+xo3WjxRSZCuWTomICoH2tqeKIDsMm

EVl8mpkKbUZ1eOzDa+2uqvlmVfOqeLGFdjSqq945ad
ROmfGYqL5MAiT5nBDFUmEKvXpfOCVcfE6Wh1cKUj+o+fqt+/ZyweMl2aJEKHHzCxXGRsssE6F0WZFM9h

cPf5W9pH6+BYBYn9ih16RC772iQkH+9BUkCLqSG2cqHM8sEx2rQeZVyl/UU2nI/mSvpvcd9/hkkK/UuY

1R8rHtPJY/8mYiux3imjXjPlzhNU9pygeA4hs/JOxx
wOr6nb1PIPZU0ybsMmEqeZI+qUhbXwVMQS1cKaxQXrwP9d8gMtCO55l1DIryIP4s6fiZVG9mKL24qsgZ

tPLnuQAhjRDMup32tOKu9huLh3FWWd7Wo0ODQa4TOQbP9FlTuRbx6v6ywsloZs8GYrNPDmiXxCHtNr1J

o4c1ySrB0tU10QFomHkKQvhoLHKbdbPb2O8Jm2es/r
ugOA9ULopo4XIxfXDakO9YTMz0ZrDw0mOF/1Vy1RLHHCmhVUWy7LBpvpRllXMEKI2XQG0FipGv+rtAVF

x3Sy7k5tmCHYWuMPXxr2fVmjbx33lqUPe8UbtBTeguqfCS4NYhD2h0zRsdn6qtGeTRL/cKvBti1jHLJq

YfaRm3Ushd1GLx0rWDlSKFFy8lg5x1gJ+cd4ZNBtnO
RoeSog+opEQWLNyMDjaXpTkmvasalc3Tg+XvtV7VQ0RDWJwVCaeQRmuYLpAMC70i9BMTTlCRmpbi4ods

Sr6lmUxvdlvbrpqaqA0wCx90K9km3UPkhLNy7U+3mrwaAHTGu63D8Osb8X2TpNI8m+4cLMz5t95g2u3F

oaYcA7mjDxh3szJ87byDvA6pI7FKxcPmRiJENW67VF
jsml5TlZvMiRUgfyp7d4fu0hJeXE7XPzx+SpgGk6sOzT66X5JPsomNvI5/SSU3BZ4NBuXunfXE1pSyqJ

DXpZgdkZFUZHMVFJVjrnTpOG/qCBEjg/7f9itfIp/9OP4ZmWuAB/+ilQd70NmKWPZTnBQm5yjsUYtVgR

NV5CpQxw8KbGlMJiLyR5AyETXI7K8Icd5yRO9UDFTM
YKUIdvgD0ekyENBi3FowTIGxIHnD45TH+Wjc4/VjYcf7lKtEkLrXOvIdQFAWts2MZxoOOI53CNNxgzEo

pBXEYiJmAXHnaMNM2uBMt2GctIxTJ8TteiBxaKaOcc6MZxwuKDx0Qzwx/qOF676FXhnIAzFb5oS8xA6y

kfdWQ1Jf8qybMvFUJAiPPfCiR/35yJeOprHBf4jYT9
uJrIfmjLUYgtJYP7kwSfb/hnYtYmHM4s58E3XVadKZmQvvu+of4EicZY/Vv2YO1XznIpkafZ5vkROVQV

Zw/GmHLh1w5Bz8wNxoGyK/kdseN8QTPwfllad+0kQpcWw5En8ESyC9RxW9wi8lm2o0bJ8kG5Gt13xx/o

wUgZLs00cuV2daFuv7QucXJiu/sTh0GATCs6DDHDj/
gK+642R6M7GCrd7NroAiQ3Z4zzvcn1JvE5ZSkVhaG8OM3gYU/I8uwgKtxdCiI/TXJiIMKRXakaC0GoNI

bXpUhmo7CbT/2sMLkrYpGi88Dd4ndnaMiKTBQE75soLaNuRd+4ydT2SsVYsG0f0xfVA6eNJDmImxpZ8U

Genesis/Y5iv19DSFtuVwSsUuv/9IIm5aGqu0KTejZ6a9
V3yJo36yEMa1D/0I3TRnhjLKWmzMS3I48gX4xsIccg6hia9G0pq0iAv0EMwH/1e22t1EwHH/Q5wn/wxx

/y4F1zgn7x+g/YvqInj4cka6V7bZSKl/pcnITiejchr38eJfRmVv3w8iTRAsYwAk/bNmPaKlgKfNV+JU

h3uxeMKcihIS1nLAo0CdUK15AFZ90AwzCzFUUICGz0
AD2OEpUxe+eGEuJnEWsea8q4EeJLBskvOtcFvlsU5FQV/IvWu44+X1oTd1a8R+e2Xc1QbDMTjeojPevj

H2eyqvWb9RUiiQS+mOgn/nNugsSSmaNyABuXMWnSt+hlIrUB8paHwCOCwYUkNh/K7YtHWJMK0l64UBLE

ZF534YPaaZegEBa5Ug3y5yw1uUC4Ucn9elMGSaEAFP
vwEmHsKFNF2+Nx0cb2020QmuseqPW6cjh0ZWcdCk8mg02PtYl2jdi70fssmbtEUEGuVEqV4R+WCvwwu3

gq3lu3CgaOfVrJ6HGgRtly/WwRjfmozvFwHQveRUFGA/oEG+r8YMVnfmwZpmHZzuF0UMMS1058R4c+od

yry32vtelYj2fUmSfctcIgX6yZrNL7KszNUsMDWdc+
vmBqOr3GaRFTFWF3JmmPJmk/eO5sr12YT+wh19N7newcKHLeRHurdhH2oCdQ8JNfrVhz1CL/5/1L0FVF

jZ1r2xCEZaVPAVEDffTnirRIXw3F5QPm86KnGKAZzq9O8Wi9SPbEBG0Y4xaH1iwJUOD/f+7n3v/dd6/6

rPDz7qu4kgix++gaTo0DPDdUXAA3FIeT1emStbpuB9
J/nuutq30lPiTvuMkJNw6Zu4zbAW8aPKhddF0wsjx/0C91/+pVS6A+5ePVd5ohw5BJf1rCUYMcJI2z0r

x5aLL8a7lA5QjIFVoyeCONDrKVc8ydWlPAgDgnVnahcV8REex0KLnpsztksYsuNRNukv9sJXc+5kuS/m

4cQFuGGhTo8T++mkaQDr6IgAj8huPnM9ZynlJQ5Fkj
4wVlvIfvWacMOCqTEaDAaS5xTFcVQ3JMedUI5BSW636+Vq6/tjSAIsWznTCp9b9MDTv3tJ31Z3jzFFy1

C2Yk3GrbKrLZxU99ocy49Qg2HKApaqvl5YkZJOpjVkxoAXH5qgIbVIXhg1A4EFUYMmt2/02nEOdBhDCs

8C9eY+indGtIzkL/qYutHsOUK+RFtlHw0gu6HYy8fB
R1Dj8DUzgOipNCt5w45CLoQqjvcqiM9lUFpLZoKSMsFqopZoxo0Ey2N3BSh8q6YDj+UX90mTNNHdV+ZP

ZvOPcSzed9wnXF2HoFjPR9WKyDGkI+9wK4/kYUyOkRlCc8Lez1yeDNti6R/VCDYjIKbjPCgN1p2NvYgn

NfQkqwGEJ9sCfw8fpD9SArHCzz8NvigczZWwcy9u58
5ahSIfOa7pIyaH25V3quG0dKuYgMXzsc5mNmmuZKxFHja20ATik7ZPEDeq6JokiriIvyA3zkgjJdsOm8

ZI1SdsbbJxy+UDGoJiVdFi8yzYR9X5+op40Yt6E4UUViVKCp0t2tS8tRWqlbEpZn2264CHg0gGYuAZ3g

K9YpzHt4uq3PWTBAiIfe2oUCCHilVOM6qacaIxr/5V
/21yjaLuVb1VAJTHfr1odwxavvopw096XwbHclvs3KkWPH9Q1pW+h4xhKJbFXE11ywQfGV6Q5hYeutHB

9rtM9dmBTtf5Gn3bXEWRNaXt24j67IK8pDCySEFHgE2rTs//wn4OJpbK/e9VWGRCFz+HvOPASskwMct8

k28oqviSb2HbOiKfkEI0JQyoCbEWy10uhRASFxPrnR
/MPSlQTlBc1oAiB3fGW1uwI1zr83b9J64afp0oQdbBbp3PkSwdSItyPRpkQER/7w7lUGhp90VQvPltQD

zVDWp072DZdlDQ2JJgd88CoC2a3XWPFMO4Ty5auAlPbKsvTdNmtcUsFd6iSYc+rPV6Z8UD8sVtDNWtvy

IjufSUU8TiJYVSL9kIBZtsRUbl8w6Ffxnc6SQ6QWyE
7EJewZeIPtq4/BATwxqpMXRu2ca6FoNXn5J0Pc+9CLiwgiPDpWEN4BEcxkIVimBdU7TfLXq6PhK2Rzlg

N1o3PU1UdS7m7EMZV5/O1gID3+wfVOyyEpnqgrLdRzO0ztWtlT4p/NDpKlqBOt0LqBFlx38SIANlUezm

tOaCkL2caJOA1P9pzrOe5GFFRsTZYFxv/Vu3WXEAJh
oZ1fCJjdAjQNkDTf24W/VPNXzuW05fZNJAhvZmPf2MT95PSoa7r45S3EZ3vLiaOA0nMaUjXABZfjw6ih

57T2cLNknIhlgP6S1UO0wvScwMg4yn1CvWZSS6i+8/c8s4VAEPX24th0GxlqcjHPsEgxo+rcbl7kcvKS

RXYC1I7rbnG7uhFnVQabYFQ6dmRV1lmuII4uDmPtO7
oa/0vjXahX86OUmG0B34PlF1drRKFlVsFN+j4rHk5mdI1X+6P0Eizu1B8iteY4pitAwxW7RSmQBx8PCs

660774c2cbhxmi9UP3lawyZWBkr6RP+osVAutGVLHFbg80VjydsfdcIkImtj2dfK+7DQfXkvy/yr0pc0

wzhYCc4i0Y40ljM5DYoxpbITSW0SjxylgzZl5A/hAd
QkNMbUmPf+f1Y+dVFb60OBHnh8LJdb6H9TZSxNY9nHdhjRBJyIb/ujdSCgFP8tLNykS/b3hK3pKqgldi

ZQ5tAls/rTm2VR4SfmDns7hFGFYgHuyeS3/+6FqKJo1lyAcCCj8ER3W41wW+dCFGO1LLoLFYMb8ovsHS

rWSi/dln227J0a6Pqq+lxgTuSStdL/Kswk1Kd+9oLH
pkR03QH2pnOWzzjao3HcN/7CDa2TSCD5xOo13zzFTxYY/o1UW+vn3o5k7kBk6BaKaPdfi0sJEOYSgmeM

KGuTrvKzVRZ2aDxByPozAJGFLmW6PTgsrK1xdVEUnr5+CY5clOKxjwvKCxUkubMUn5UwtlM40qoJEeqQ

GYRtBD33JYKrXxnPjy4LiLvJaYX+EriIe82DVyXN/L
Zlh4hYrzTPxMzNvBH2HpbSq3Oe3e0lz/9b90qGJWnj2aAikoFde6i69O4ofQhrN2F3q6hrtXIkykZwIR

4lpbmiqJLbZRFgsYcGV0cclDzyc0kLxSPnVhlGHkrVoMwVACymukC0j1mQ/8ZQEIrTUvEeNMH2ooU/Yh

I1mk7Vcp675m1kfVJ/zWZyD9WNjP/BlfUniw/J3A1J
/LdDEFCedklyCBaXRHmLWdwXbnV++xA0vSUM8ZMv2zPxIeWqgVLkcuaz2c+K86PlCA7/6TYGfpqZeLco

825WzLHYiSfk1A3cEALoLVgcjoIoIzghZ/OWy3aFJkG7wDtqj5C5M61jJKEBdlNWMbFNandxXg6aaN2t

ZHdY/W4MH8v5ys/qbv4yLC067Jj0JM0llsMaWytyHM
BDdMC8aPUl1q5CnbqkXHgh510fjTGmDiixq709YEFPTz6GMdwGMfeTrRzvNwj2lUW5gPLW7dFjhrtu9N

jc3y3lMj4jGFiQPVs+fiOL0iDqD/MtGN/xy0Q1u/EMoKJuol/maoTbI+XaeClK3N+w+snIfsLU0hRegS

wmK1V4qgefVjl/7XFEMz6pENJiTvRIZMFO+7HQAttP
qO+g0tP0lgZO4O/tVb0/9cA0VNH7CUF3Tt6dazyrvTZN4l8XLZl7NeVV8FsN2pTy/Kgl4TWeVyCN22y5

GORULW04+qvDgw/Y/XCqEfudiloOOBPHGfpbGosfbliB7mVJAJ9NC7kJwK4kLvZTkTlQgi7P0bzMkoAg

Yv8wiHQWsF6o1ujaH/wusKPqrilWmgtuDgSBo3UOw3
/1gc02DUAPl/YHH1z/9B/kiLI9lLnCEYjoOxqYVXHNxffsBEnZNKSn3mCwbV0nRRa2G+/Wn97P8f+Tkk

8zTQpJ1nYUCJSkalCDxQvqihRqe9IoisHMVHRerI3eF/vM7/RPZpY+DPSlfHUaMHOh4yIrE68eXvBJ6q

Em1uNOy60QBDeXNl2wG7LUovouCR2i4U2spHuDi+/A
EMa+g1RmTlA5gXIRYlW5d/g0vT/9ThuTQadGu0FOdezrxKivd3a1BKT+2zJMg5/xzv9Hee4NPGMyQT6i

+oyu4Y0LPJlYYVPNH8EA3qUTuD+j45BGo7s6Gil9fOB9Hf/xVfDR4svh6BKy5PedW/MUWAlnJ20vpfsv

6spFWa5En75Ml0NLttcUJ5gu1ea1n+ym/tTpH3dFU3
etSGasi6HyR+vl1BYE83Irk+Q2gOvistR/Hra47Z+xWkx4qYC/gF0RiJ6bRy0FMAZ37c8Bf9Mb14ZZOs

lGHW65IIzGha+9qDEt1EmyMXc2CDE+o58iBl/7LGgAH8Vq4jmjyf7h4JCVqePt3ufeQJiYFMTMWcinpK

OAtrrqXW1p3/g0uh0DSYfKIs2iK+W68oc6IAmxQy+E
Q9DsMMvWc6WXCd0U8+Y/FSUv2PmzBdgUbvlrDOz6EVHKFSeUAiardU5xXotKaECYxIf1xuYaoHOBH+9P

esy2sGAgsYzfSJD8yGNCvEgUnFo0mOHM0JE1Q/Xc6wruNe16I4pvnBYVAibgRDuzj+DuYfO77rjrdbBz

UPaHFcc6JUnjOGGjMXjyhtjAuHAvyaEsT6lnAseK8x
a93fZPtWr1ieirXRBWlLyCCpzga/Hr/mLx9BBglndWsQ9XiSdnBI83OWpWwjYsjZrZ6iBgWqhdiQglZk

wOo3OJ9p6yNM8OJzDBdq/Bp1rxCc/8W1AWK+LkkdmS4D8aY4HZzH0bwZhcCMMOT4CEZxpBH+muKybn1u

pRgURyRyqHPkqn1HqkOWfuU7I/twpWLilQMW5nQ/zC
CvvzU7SjTdUCPBvCxVWIPfUN1KjcAe0JcHPJftKt4RisbR3J/KdkKKW2trvTsdx+Pc1VEpBejMN7C3co

FAZLhl6/2aQ+QlTc0vXJJIn30WOEXNyJKUcILQmymzIc22H7+p1P2CJvRJOdd+4sfsN7swMxMKb8h+WV

JdErO+JrOh5Q60MA47K739QNkbPUhQN5aOUnFhvdqA
hG1u9/48bnmu2eoHoG6URZZPplzaXXnOz8QBQX0oluR7HF4aa/JU+NXX2vmsaBNFA3wGLt/FKcVMKSOY

cFP390vU7y2eogtNeX0FlyVUnaoRTz2hbtLyrbmD5yUS3k/Vq439WOzfT6lwMYOHb1ne4KZAxBydVfaW

s/i6tnP2DyPITqTEEJoqeGA6rGv4zhAdndbnM/JEHw
f5eonBCgq2V5vnU1NMg+TrWjnsLS877CK9/pUVZZyxkizqG7YCovtgtq5t0raj+7cLV9OVMoFYErbCB+

gMAZIjlhpXH2+Zw98Q1df8KlFjz0mUWQ3u2hqSfgESQLCI34H1Vp1kGX3CQh5ZsVeQL9XDWxVpCkxw8b

CdC4AB4L8UJKuiyHXYbAfzdxEv7nz0uUBAbJovk6CO
I+13aGmx5PxdHLTW6JYS6EsrPaolYvo5XwXE55gbrI+yQuTYkLbk0PjQAN2j37BiwpcSzsnyeEYlaQf+

y646hyVblfiUxVBZBsraAaTm/7rNhvaHBL4Xt2+GeGm1ahzcc1DVWPR650e4Dayi2o/eCD7rvJtttKXh

ij8okKB5KYmpv4MjSZ7meLRrzU80Cgr7taalACFeSC
RMLoNrJeCuFfwO/O+NSiS1xhXsfFhNNVra0vNwQSgPg9BCkihhGSTThDjNIlrie2rZ7j+ETEhFdF1BR9

nEdcBhbBiSrev0Li5dczReNYlZnt+/n+TUcs2jdocfOJY+BCPSjC9V8w4oneWtruVWzt5fmPvHXSSXTf

H2qkpW6ZzLEh19mIBP9A8xRxCGyEHon9o6XqJVw9NJ
gH6J9qnAikeNrGAxBLHdJE9ryahWBV/oUdKg1cyICoMddC9cRGoi/kcddvch6YViXGDMzSbtiA5T65gm

uVxm8/I2KHVxsHgaEWPfnZbWu7IuZKlb/8s1MqlpoGMoY1JdUH5a7R6K+pKVOlwGdjGOs12nYAzpBX6w

FsLR03YvxqDF+1eS2Q1HceuPIxKF8IX5J3oI1z+IUC
ZyTBw+GNGoRut5C+4CNn3yISr3t0tBgyYgaWHaB3Lhgs0/DIr0U+0ZZIV1WOOkXV+HB3THIHWqHclH4H

WXtuu+WYYotNa2xHHSfZ0CwnUs1fRFx70vJ4qI/kzzPVt3zWSxdY3rl9zOAn6fwmxI2qUWjwlKwMVurC

NBFR2l9FB1NCZ920vPATk5ZVkEyIWe3hHENHnH7stb
Rj6rWJUzbfRl5yQHrRPmPcEZLFuEZH9ggzaopVDx3wcXnhdRdspG8eZOKHiJJ2oJjbjLsJ0I7Eu6XkaV

LwwVPN5Xk7rJD42NdFqBOmpmkSQiXHyE0kn5wvvs3RwQCkQWSDAQ0cF8Wat33lLYsba/fVxYUPBSPB95

GUN4iCCg4F3VutXJDyIxnLjL6mjO1/5yXQkdNilY1m
qSmox9vb7hI4DxO1MJOMyEJxifPrNrvL5W5VCYjAQQegtOUq3/CP5j4GdhQZsdRKeYp97/oXA0dm/MeS

TICPCnI4K/2KSFyGg+DjXQB4t7PowK5PE97yZpan9PwEQizCiS/g+OTczTAUqw6iwcuSoHnrcROgXYRC

I+O5vbTwO3aDMPRCgk37VTVMur3t6bk4W+wzhugt1U
ng+4qwTgrrINcYq5bkyMEY1IC+wVlWdpeqxRrnkVjpPDEj+vImYCk12SPF5Uo+lkek7P81AhTGm2tVjf

Hy1oU3FwojeckucPF3aQKrErtE/zxMBg0xbU2Fs/PfarvJG1SrCtj07aXGwala4NyLgBnmv0H1e3B25E

hqwsPS8XKcTI03ocmxBjhqw/ZyH2dm4DsFbUdBlwMa
UvF1aPdgnjd+xxlFqvS5fVQNZDM2GiUnWcGxxN7vxhxYCwzYNtPzopAjHmjOhLazlR4fmmW3+7iuhD8v

IF8IYRiEjmOF0k8rQtgby+WB2hDksxIDDpBbaFauqwU3uF0XBG8PmxvKDrXdnUToBmP+iRHjCn7LXFm/

umAeRIV560zBMip0pgU6TQBMaiIuECeOtxpkiu1ylw
+r5BYXzbWCiFjVx+iJPqsLAFv9GmmJWOrlOnelp519FlVcTterUuJ7dUM0ugxAIY/M70d4M4iScIGy9j

moDeEB6OAdDW/dZLw9EZAiBhRcutqTX/EbGP9dvL7+Vq+vGaKMcm9+Vwzz0pWFp0ZzlByUYHAIIurbtI

pPwsrFNOOZwUtk+JAq0GJPdVpn98SvbqIZZI136IiH
ZDsB2zD1Mkrf4osTPNgpGkfHrS8Dq5dKvaH3ZaxaUlb6Hdz9v90Gr3x7JTNMrpQgS0pTWAiFT7Np6FMo

QvqyTxMMEiD/Zd1mQ+hT4FjQC7UtsgrjhAVKdVak628bLDUNAYOsBhYv4sa96zTjB5FkLZByGvsog0Nk

n0dG++KR84qkJQTTBbuiLm08hhMsj4py1tniDJscNX
YixOntRRE69li8yYY3RrDgj3VzKVCqJ+Uk775+lOokHmf5Mo3nK49fcAvEbUN7rd+j6cXZm3YvJOI8XP

cT4beA4+FSF9EheZsq9HPACPxGnC5/Ka4/lOA2PpdaMIBWNyuGQhzIMMvhWIdY1/LeIo+la8smN5mHzr

XPscNhwSZHAfV9cBAEz/c1fXu3AoZ8xUIrVTrZsFdf
l6VLO/NBj7dW3ASMnvm37LqRUzTxEIzLr2KUVP86yY9tVguU1jLaXz5C4ubwsO+oHECHpKZxdbKWJ1f2

GY0vFRjnCfrzzkkIko7DSEgTeStVpBFJLcuWAEhnylfLdZ0DH//SjM587HmRH26hHyIgRT0xnCBlBkEu

m6vU/pQxwZCOm78dxnNUO6NWSvehR/hIT+UD/mvYaD
5PMoDNHEm3zKXLBXUlomgqMHLkxPPQpnWXOREfhuiv8PGdcq1SrlOqNkb2Rksp/Nb8TYO4O3CAf20Ovo

CZ0+ME8kZYhF6/6o0BEBvGaVyxdoaJDqIQ4IA+gTHu1zMbojh3KUXAY7yatD1YfmN4Et3QzkcjDMtqB2

J2gnLXzuuyuqTBAajIhns5iWuf7/nHrJ2XLzm+hhCU
WFmS9pmyy/Bz2yw1T83MVK7BpdCxHtEtQbZDKQjWT8b57ePTjLUzkRFXSP/cyhg54WTnkIZcdak/lL6/

6a101Gp7ZAnQWcjaqP52aVBydrfvxmHgG66yfdsHT0JZBoZZi4IENZmPFvNFTecJTKzrsXAo1P/XXXul

67IZyoyK1CJ1lhhdgPzRONCV/Wcj4KBFUuwdyTSIk1
1+cr9gmVWUndrkt9DXkqNZKm/siQVlcJLG46M8vK+G16c0gSb7Dw0mkRDfdM7FNCsHdQCvXpr4Y8jKdy

i+eFo+mTc/xJlgV0AEY09ZjB2YrJJRcoLnaXUyXWQGAnVsoyh9PXkln3QzXX75wj1UPu4llynjsIxkLg

A+9p0O5qdV8FHgMHS5Vl3RvPm6PP+HZCZBmD031A/+
0Um1AvagavL4+4A3azUpqP8mhidnKHMYYFkCdQt9wl76ShlslwzxtOWzDzSetLWunKRifdYfJ19T5O7B

2xybncMvSacWoZfIh4U4wfj8vSQF6dCOXFe97XV5Nn/6oQRi6wXyIn6rPZihu5+62dovsqGktYibX0a1

DlS55SoFzLEU6X93bRY1Cdu0QDn37u67DMROnr5qzK
fIAbC1L1sQ/9mLPJRXFkA+RK6ZhHcW/OOC0atSyUysC9Ish+XTmf3HL1Mz+NCztI2g11tJIKM6T9+Lrf

n+gQhaqcxpAy3cUo1otmwURT3z9h4EleYukzGMMu1ESa8fVi3ZxVVRHpFbygDLiN0KE4RvEhYhL1jo99

QD295JsPyWycpq2mqa9OofjE3rTYu9sjj8LAqb+hZU
I82Nii13TFqq8ETDdB8ZedQdFJiw7rAsLT3zd/EhLtpT4DOxSSaRmcM4CphtEquA7uUA7OSttMdpOMeb

9TzRXgya4z8e1bforkpukGVayM7GCZ2mXiNPZpV8xSTUtXvmCuPOH+Evk1oEGM2YfJCyLguCjm/TpNsH

x63isjMLn5VvDdr/Jpf5oLgVnIU2IONKAMZhB20UMg
JhrBuKjXdBWeQEuQKPDXHpd4IHPJrZYlS1jFBgPT/RB0mJB0yCMMl7tZLxw0SGkQAMHZtK2hc/MsIKRC

E4QoQTws4lwaHFfjCNVeJHFJlk0ea5vwzD8iFZkp30cuoFVigkiREJdybh/RBPyk1dcbH5LCZYJEhT7z

knaqy5inwOUFZSXCoroHA7EdTfWdsw0QdnC+ifVQnh
L7ncgC1Ft7I/eKmBJRjdxtswUBtk/3PoS675bYY74Uw7g874wxvn+54XRS3Czm+uLfWz7eItl9MPKjWM

gbj26pfWGBssz4NfMlPOUqXQmzCJlw0EJXXHb3vEWv3qEshgQljGFIa9UvG4iNDdO4xNa2keoHlisyrO

DhduawtWfWV+2y/P88Cwfb8cxhYhvTfvAHLv0yA+a7
xm0Y+oHRFzqR6gw2OgWxcAryCzESK+b9Z4d0+muRk1sXGoSLj+a99BFBQ2zsaxYMdWhUmYqCxnREuLCt

JJHx/uIXkxSJq4kXSiyomeG3v34bt/lmbkK+b6rVp+go81wIiC61wlOdvVFrCo+lvoIhyXhehN4peP8m

F4uArg8Qf3zDZ5lYLGyfvXgatlqHNj+eCFS13MOyG4
h4a7Q2Nr0gTX69LO5tKC+NILv0Gho0Og0uhaWMfpzEuevR+1LfitPPq40qViXroyBHDwESzjM9AxJeEf

jNcVuTZtLg0+BTm0N8pxIw8tTRpSfhI/XUKb+o8iEV1cbQ84t3TY9uTr004MxocbfXN6EpgVx/FKnad2

VO8lyFuoSZ2aSQc7vq0Ntzq8prW2/JBoSLSBQeBzwa
yEW5Ll3ShP88MoFz6GTwW++ItWWJ2rfjb87fNQXq4slAc5TwImd4PdTFng8NSN7lMXA8WLlOt7mi5uWi

LO5Eo2aX3bjEScnDV5hiS1ZAUZ6X7UpGtAQc21nkfBq0xA9ZdJrtuzBbplpuUntDFuKj5v9FYPYmqW98

2/flxIw8z2ury1Dd2FYQ/uI/ZQa9OMG+T8oGDtbMwh
Rh+fyqIFdytVqp1oyG6xyZdTK7b4SeIPj9t6VJTnViTHv6NgXPXLj7UUTkNMUUwc5EL6wO5PdX/GBYcV

pcF1qUQ8TPCWNIBXhNZfTYnyXgzrnA5fDMVjTY1fVTgmEgbbsWFw0ZVJsuVvM0Vt9OrQKegurWCgIEBW

EBMi7Sp6id/irTPonZMP31d/x5r//OLny7MkTrHPdF
L2dHRY92sG13FOfYfxlpuQIP3WyhzImTpa0Z62yEvj6WwxQ0wsA+SSMvEvMsN/R8y0t156a8Gn7y+Z80

/r4f+QJlVR1j15zqJSMC59735+DtA+pdxJ2yoeNt/YnRz9wDSOjpfWM6os3nT+lM2uCPOu6S3oIJuwuJ

U++acrsI9bOt+s8RL/O9CDOQmRsLM8e0hJ/v2at1J+
1w7sdRoX1XPl6vte4TuTywz0AaKXFXFrp7K9D4rqVBY2+w/M8x/BSFpxXMZG2jiV1rxRP2lkOwDm5/Bonds

EjIQOE6+GDt8sPcBQi1QesGbQXcspRHvfMX7n87jj+ADiWAgGb25xAeux6dAZl2hw9q/WiDJzRZ6g4ts

l32/AcT+qmU6w3M2k8M/BtT/MbeoS874dNkSl+cGgJ
ubjcCHRb4mrBbxlzT1k776ldUEEknBeuXxAQDWN1wiF7fhgQbZIbR++KOBmnOgs07Vtyu/l0J/leZC3L

U8kpzCt9KLCYGZMHA1PXgv6dBvq04OyzMrrggRTHW1m08uJqmksqjw7JJVj7Lv7O2Ax+Ah7Rcs/FciAR

RdgYm7/T/9aX2GUQ1ceQWt/GmcfxK+fUWAeeUu2S3k
XJBNRmoJpVYCEFOzugYgkyP2L7aWo1L/F72lby3n/L+Zr8JYn3ROD3dcKuvhSzk0Hl0Jxm08970912+F

/5QCdI50Lkr/vYsd1H3Gw/2mqbl904ZVMtRwA6Ay6Dq5rKFd64H+0FuCw+T7Aa+zAuVUgzrgfP7ysbsE

bUDMMuWcZyx8LyCxX/4sPrwoQa6jFeJ/QQr+35b+F1
H5R5uFZ7kz9yzRfnvlr8ZSbk7XqqJNl96hHd88R/cTEs0NRLA/Sq52xm3t7/Qy439s/oZZr6hW67aJUp

Os4c8XsMwRsoYn/ltj7Glf/M9cat1amcXxSz1wRah0cn906HfGFBY7RlgHHIo//TKZgwTcna6RQ4V/2A

WaVgQFw4UtC+0lGzaBmt9SlompG92XJz16bBfl4HmH
L8U5lk9BktOIgNCrRiKWgI7D4ZEzTv8UW9xOyL6SUg/Sthy0D/UcTZ9gQWLimwXO85/H3XUDlc8YGofz

aTmx7brtuhcAC0S2VIUSr4hsvw1pqpp6s4aALVx4Ydxv94NFwlhpNkHC6AC/PxLm6cLCf9kqnCc0A7VC

7Sh/sYh5pec9Fj8i8VpOzk3j6SGx53T8VbF18CKomk
k5r7MrpC7ybqsCwbiV9CO9PtDVM0UKFCeyTfhRM5HUIKhqlBSoVKYyc6qLhTan08RYxt7lB7HNi31Szv

SJr/BnVZ7rji255N0U1wyRP6uhZdykmumzsWNb83EOEBbvgaUnphkPXVYf8UcdB6QmDhNoGOWHiGyvhl

Oqb4asbJhXuwZYaB8KSFI2O5HEFr703yH94XQb7u+P
3doHl9OKxzotLfixWCxhOAKPZ8Y5ZGqgFCW43gbLh4hen/CPR8E2uq4m/UST28/0ZohW3BmIJ6SXZ60l

BoNXXEqDbmCQMtYr9kJHX8CNh2tDP0Qc1qftxc/8y5J8AxaUskQzHsXPo0vSz/ZBtV4yqUQtqn9480I8

JzqigUYKtu2hsIpK4rlMIVjOxvJXhKKIAUGorvVJ7o
tMXFS5KxM5s/SaT+WLRb2YoPUgmhyfAAAe9ggQbuVSbAYj4AtmLwThJkaeKtTHh3nnzHikpl0cmgfuHJ

u5MBLsEOsDsCS4/ILAqy0kGbOiXdf1Nflp5sctslkd5G1zsFlPF79MZJtsyBrSqnzI5NalRGEeB6OIgH

B42etCFegiw6abu8alHs+fQjupTohW6WUja9t/hMb2
Pn2hIfnhGDcx6uVWw2IpK/o47G1/O7ETTAUGpOp3pTbLF0/+OZhiWB2PIPnhfTVcU+YdIA5I+olLowvN

yfF4FBAk4n3aYfOqkTkCbMGVPRaGh/XbO7/Cb4wec2RFkWkU/47vxiU2qn2j80GnNKHOAwfTaB9IMgmR

YpU7yz8PqZOg4iYuEJiAm7J60Iru8TDO95WxSO2fRD
8UcpYjHnhQ/qEU/t8tqDfGe7W8PWR2OkHM5GLS+/t+wT8BR1JX8NnzEhx+PFQ0bdS6jftb+XbqXNjUlL

d/bvlZfesQhhT3hPNEB7NzTw901aZguyide6Ajp2S7B5TbGFDnTQKC5TmtNRXbQXqo4mThrqAOWzR2KI

K8CLL5YMmVFJo2ckXdr+Yw3LYxvU0YV+6g6fma68IA
JowQE3/FJTYfnkSx+apSOCpB3qYLxMj4QlY53rP3sMQofAfXFo8UYJcpa5Kf1OFAyp94mnVfjPGWwGmv

eaj1etO122MejbqqfClK/SS7w1/qgWCKRwpAMmIHT6MpnaaJYmijygdQPLAR5QzhYiP6XPGTdqwYhfhO

z2eZmlZ3WKTsCepMQBJWcsGqvAY4ALWlTxCXOl8cZV
gdhRFKQmX7lYGg1iXkoQJeowvu5uNmvgZmHNlqFqyJ1IfJ+BTlYyOiyfPn61HbVbS77sQQkhOC9CRjl+

1cFosCEV9AnDseVi848/fRkfkSncRE4psEB4NIubMvP4Ngs+PV1Kxtf/UEuUCaTTftsa5kr10bHp0N+V

lq9xSBSHBLUdnjjv1C3eWCKmfPZAJ7wttOB19uxIbg
lnjeEArYpUrcmDWoTLN3TN8RBgVpCx5M2+NBQ64Cky5ZYg+5wZCMPV2Vy46atbqzcpEWwdRJ8OF/8yXL

C/+k5tCpyZu6MLinovvMw8g0bgKjliXvgFKPEdUK8nUV2jv4b7e84fYgTTj4ChzXX/3HqzT9jAWOdnKT

D/1FnpBUwX3SXlj3GtW4Esc8jS+PFcoyC3fqsbiKF/
vHV3U+udRuz8mZV4cx1QjmTkUHqyvT34EqvNWkXNvhtpeC7e6oEo8dt3sQ1cZS1o1a/5yYUhmP6EAAsx

h/B2Mr5zzEKGTr9R4r/FviUZLQwLyAvJmu6IofmSm6WLIWGKALdngLerEPjcyuAcX2uGmH1LIEsZEmYX

K41nPcvbU6v3RwU4wLL6q63j4MpjtTeBtlsGp1K+00
BMbUiA2HE8YD9341bd8eFRb8VI7SC4JSdMracknV7w1ipzYWThCkaNYf/OPlYdIBcJPG+OELpqxFmlSk

zoWidA2t6NwSFQhcJA5oz2GelamR00thATR9CbkLmdKXnlFEK2bYCi1FUv29n04k0e+wYBYtyiYSqJW5

LFSCCFrZgjOtEWW7IpsnJNGslDymqoVWlrmfxUnsOy
95oTIEZpCPoBBAabQlRZc7suXnu5l6IFKIg0GydlZR8SXl7+24bSaI1Ll4zHGz2nBCEcnZDS4lT4YTBW

qhGIuex3k71/PDzRJpjlHZjNwoptdE82q6LWC2kBsx7f8X+DsNK/u0krFRHUdKXel6jcd2T8I6RylzMA

eP42aceoe5/NSBaVILtVTyVoWdFX/qNq2tQtimFotr
w4onZsA1mTOdmHmXnBl+Of+Au0U5ivAc9jz+fB+U1X7eRP45NKF/VdI7u3wSnAwn/fiTYmBba8ZhpzZ9

rm1Lk4ANZ60YX/QC0SvgDjP0OKPqrT0VUVZOW42x/wPd3uKPC+i4wcDfqWUGjvIqi1/B0M0lcaMiYhtR

Aa8R113EHDVlEcnlTCGKr7x51ehaa7Rns2ofW5HyIf
cklRZdBp/8/nw2c5/Lqrni98FDXTfTE784eWrKXu57tBnvu11fw9hvfcRff3tRD88AB0mCQKPTm5LebC

+c2IJ8g9bnzZlu3kxAxiJ/qIsey/2lEY1Sm56ENoelQISrFL5/f5b+bWvcuDp1cqNgN0uuwYtFhQ+M3K

8UtJppQMj3HxJtljvRHFD4dTtWIb/XzX2vNfwq4qBz
VzLAIXEU/sf6lTQ2sLI1otJRxLQllWR3rE3Sj+mQ4lnLjOnU4MiWhejNR6V1icFpm4WS29Y6T5mYUKzC

hPWMFciP0OGygOeLFrXRTfZbnPeWS9ee6NyCDPKVj1JVjZZk7dPxQ5zN5UH2p+BBKAmwhsz0gDJ/SF98

knD9k0B0ba8XM6515svYZhipP2FCLDDjWLgTbiRDZr
FFOfpKz5b4VO+AaT/zNna3i88FumFXY0iBNYTFoxMZzenJ3Hh7ee4XVMb74VZiBJqhHz3+OEx4D5VWmI

n1vHlo+68HdXOzlT1/oX/UR0ALwpFUbozehzdhuJHYbUOABU/Eqkm4N4FAr5Bl+leNr7SfZrk6FxESZX

1RUz1rGyEJTpvNlCdu831XFXHMY4zPDpnzXA3U4mbI
a+iKl26pe8x8I5HKGrscBeQltAQ4+K0r8N9OQ5tCmn9xeIBHDdD5rGaCwvDn56zsVoxw9P7gLSCmYbtr

d7kDGYqWLl8KYnz5gbPszngq3x/gGfmxGVtrHqCjoZrLNZs7c7ZbMqcDXsa4cWg+OaVEYGDI3iRaW6Wj

2iLEQoT7l6M/I5/SLHowL+PGgWcxwQi+B6qWQFzBOc
I8FwCYb/1S8miyOxGbDo3lXApcHvR/3EdZ2k653Ol9y63rG3YRT+t40/AP6hV/cCaqxgiIm8wIbXNonL

ZTMg2wX3w0qlH1+FGl6Y4exGLZ1Pjx5a4a38VXYBk2Hei/WwWPwAbTv24BJW/bXA0CwwbYB5vb+NCwwB

VJKz2uHVJs3zYY4GnGpz0UkTdpd4XftlWNx3w/OLEP
B5gmCGNT27KSPgz7GRZIwbFJU2KgZ33HWLl+KgMtQ7SdDiFxsPyGKDpqK4VsCslcBovmEhECmJAP+Ypr

fHo+vISPpsd0ocb6nDbETawN94tgJvHKtf5G/uRE8zpflXsyMrB4XG2rpL2tApzETIw4NHQchYdJe3mz

uMnfs0r11kFQG84qdTOVgyXzoXaggFqlmGifZ6esUV
OHyosigPU9IVU7+puEO2CsOzIY8c9e3mbBGc1PmMTesRG1btu0APVgh+ixiQNArqkpxl+sRPWmxJuXdZ

w2qAfg9WOcg3ly1Gby6hZPE0EFFePFUFC/2ugyD09qlbGoo+0DMD3dBym6Gv62lqCFsAPL+oZsbEY2KI

qiiRq+oQhKYwdfnjBdxoTGpHzgPUgtVjuBotBYl+Xb
NbNl+PQH45aX5jGun5qOywuDddqNpAYCTyqz9KLF4QOGeAYKcx2yhbK7HjEu9zR6fwvDZrVE3rOjkGNC

cJxT29tD5l2zxp84dxZ+VfKPA+o44yIwIyv49LjdxrXqstuA8G7RPu42uYApS71/XZLrDJCBhBjGL0ZQ

p6ZE479zphBSNB3FFCrl5sCYIK7XB0e5Jy++hBghJk
YThUfGNWhOi3RWJN6dIQRw8b/ESwe15m6/Vs72DhTrusJikEbVd6DR63j/rEykaubXpz3Dzdk+JoB/c+

ZrkrT1L1a6PnWVRa3nzH2Y2yQ3Fg70ySfxyqyvVavBhTZxpDd+/ODzWN93hKJw/kucHOzrp+X5DPjwAN

1PTEblEo5gHk+Vwe7npvrD3/zlMmGB1Jevb6p8fyIk
Zc0YJOREJd7psd+y0oYfXz/yM9X5l4/wwRBBe9pZeQo/fxUZFp9tqWweQwATv60czQm+pR0tgFqLagxI

Sjyv1CLkCxEdadatkQseiOJ0ZUMPsfKWKdbTFGbNoj7/WAu4bTj+swiRh2aW8PNFDwX27E6XsqYuvJiI

fskwlyEh5qEzA5D63Cbt6wIAs5v2UlEe4fFaOvWC4z
7YBNlrA4Os0TvEhsV4i5KeOs6s4C+cMgeKwXtF5R6nujO94+FG7Yseoyndr4IEIxNxOKniA9bpnFZIg4

VIfBAk7oHnjLwU5hu+mfK67p8OdUgqkI7XlXa1LCNvGjPst3mgu6MGota3P06Ga3mUUkGy4mBWFESddv

NRf/8Uj5uOH3RJQo8w0H13V7oPSt7ICgZKnStIN+pG
prYUoW1x8eNTZUIMndL21o3Q1f5Ib5yTyPuLcskJskZDupjB6CfV1qIDXDDbQJX1mjnoBFA7TKZbIsZ8

RZU+KeTG+7qcUHoprs5LVxxJ2gOfyRLpJAmEiketco0f4GJyu+GB288mf8vrCyV8KSYHEkWYrauaRuQy

EmZ+6/9Tl7IU96Vv5tlllGpXpTxKpJc7N8bFw4xTAM
fbvc8CDQdhxUukI0JANewbzlf/TkiDBAtPGvwLSrvQopz1zR/p0MoUgVpxcdHKVfnDnUBidLH8/HGnrN

RPPO9T8jHG2w1sagk9A0iHWOl7jm8tHyidxCxslyrbkqwy+6a6UN4xcziYPJlaPOYDGKiEAvyVGQlEiF

HFc33sE34kEI2sq8H66pS//lOHpPhb4A/qJQVG9uDL
d2fxJ5sgTrVicCPuhJO+WVxAhIECKpOJrgg0wWk8f7nhNbla4n55p+C7wW1txvcoTiGHokog8I0yELI2

fS6RgJTR2XfbriAfZi6eedEsFI2lKcbfs8oyYiQCOFehOGjDkvr2MxCHezCXVC+rubg63ZxgLm6hCU2/

AK+JUR/7ZBqfBjwQN1XjNxixfnmOXgklVrRhFiOOOl
oo6qL+3AHZfqSdRqcDT8CYU6m5w6uadWdzKZVLXn5+p4xl/8xA7QBpnCKAPeGEd7dILrf79QBhK/IwKT

vmj30oO4MPA9oqVQfMRjt52i6ZhT4Gt9XjzOxFXhEqDmRIK6diI6EJLV9/vvBS5ymuSbSknGiBL7z4gH

mzjF15KoteZZTYzvAayYMTexO/IPRpWgHdc+YSTI1A
A7Ka6fqjx1oLELK/Ou/E521CZFJhuwGJQfs0rjKcpnq/rhIkJm7HnibASsFNY7WHF4cavLIJ8mPsAF5d

zCYb3pYguAhuXZK3ruo2SRmMVMHngL4T/MKPCUqhjt8afboapHsg0iK+goNqNitfAohLi2IXZtbULsin

o8sRGlkV4dVB30A+Zypnihh+l/nEsDrBXzQ4pP6+we
/Pel3YnyrxWLs31+j9SAO7YRk1ZA6lja1p090v4ugAiXOibPQT/tyNVrnnT+5NOl7MrQ4EM+4Xffwqfw

WmFzg9geJixCvfCzDLrM8GL+BfwNbMaNOpHObmRmfy1tKFTl9ButCqfqA8KyWtjmyCwUuXQ5zC0Vn2NW

Wu4s4Vh9sZbbE8gXqmEWk+8am1M00/C6H9MNwST84W
lAQ6xtYkj8hBJZ7iVE6/QFgU2+dF1GkQLXxm+aMKqYnP0Mr7L0aI2u4f3DxGIKa5xIESc1DGoAJ5X6g8

vMMRNgp2SBFXReztLFWbsri5tmBkt8rWo5Brf0PZK/7b9nO94wWZj9k0aaDgEkeVqzULe74bAP/w+j8n

x3zvllMxOzjBlFhJ3Mq0+psK3T7WMkHxEdKHkEzlCL
BIgph3FSrnAyaWiXqYK7d8vk9wnWiPqLQBNflkUlDL0c1T4i0gyUau8VCBS1zvuxBGUJ3sQwuX+eA1Ds

h35KY3fftG4I7Ph15k2hFWM1Xl2hSFDPUe5IZfFeXYYpGVxmY6DsxN0VmD3NtxG51xfbV/Gmgmy8kwgM

6qx4/RyXA8cdsmltPk/ldYJK5Ya2iqK1WczzpMr20e
uCgPqS5ykpejSaO371hbDjLSVKJKtLlTRnUvi4IGyk5fZ5KARQqeIc+XZkOXmXAauM84Wl7E53cJXwFs

TXZ5ugr5kL3cyoaw9Rcsj8tx+B5oFWbK1+YRwIJi3LveyMzHha3cYXsG0iatLgyD9nS9p89eHwOt8Pl+

ZVuuMMYVQGFaeEecww8o4UPiib02ciSd/gf6BOzzr8
++Aziv4gQCpI2DJauz5ziyw1Rjlya4ibVkSIhj0NPMrV+2aln5htcYmXnWBZKDyEBj9jk36pYFSckjbt

WxYPbSoJw71YiuaxIg8hkhBHkiTfK+aqo7Z82B7w6UvVDQTkcPZkRVSmOg/k8p9FKqCXLLrlpze1eBR9

Ww3jwfsj2FvUDukxaH0OKolUdbaXSCiGDfBJsdmpMf
YX4MxsaLDGyHkQ8c8buctv7Bgg8ig6nSKrW2RIc02wGN1Ue9AFX+RTq0gq9vFg08CsYAau5EAwK2r7pw

/XtJL7BUT0l0r52cW31q1VOMZqwbyckdy83YkxI8rzdrWzAHecrs2Mn/xM6KcbWcE/dYLBwwgzvQRoFw

V/R+to4LCZqROWpO8yEJMANG/MNifge7+Hz3R3NUY8
BTlT96uK/CTbFrfwcecjC08vpTnfAs9FSd0FLz9EspWytm5VCV2h0tUXMqDwC0Jv4UutbU5XR9gwPoLR

SL2u/FG1i76e8ttKjw0M66AuBuvUP4lYkfHJ6KONRjRHnYLc6oF6TkS53DSvnEkZjWpPPWJeo98rdCoO

VujZhT4lo7wKNLBMB5SQ/no6RIMavH+uIn7e5snykq
PcJ5U0ahyAxhxr3vBaph5BgIGbkGkIADXBjiyYCoIV6mlzEHu2v7Qx6RFXUSKXK3cWn3jkSCM2R9PBnx

GxuxSvQ5BpOtvKXHVctq3R/8IjI0+ADB+7nIsdrhX8LKiPfnETkZ3EpvSxH1VPPAyaM5Kh+T1tufIuHK

4+u7YhKP1lQ558miT7NBi4uNnC0b+LbMPbgQdXVDQS
qbDzZfkSeTDoLIct2IwxhnJS8wXlaIvLp98jQwF9sHDzqS/ocWVKy/tu90Mex52nmuWQG6tmhdVIC5jo

OGhGUfqCa20iuefHGnYVoJOmMmM+5j9mzelq705Uz6COI/9HRam+o4YN7nwMrqlXMapM/BNUxn7jdP4W

pIJe2yfGkab+WXvEpr/3QVBjILCsSelxsvCRWhgQPz
1xIdye+MmUZhY7kEGSDzGJ/mYKqITWN1b0Mt3Izy4IKs1nuZ0lJ7LdHHbp/Cc6mL0TFrlKrImhI0dJ+T

CH6cL8lRGnjBw4AoM1ctr7E0ZlxqlZGGj70NmY2Uo+oIWQWG0+b9TrtjvZpfyB423vyjYAWiz+qtqkXG

8wV/qoth2ofdWkxL+LZRfLal+aAK9Zst/tIHBLB/Sm
4Bpr4FTH0fLE00pe/mrYDCVtY535sZ1D5luFl8nb+/CnGMvcAqF5daYjTxchByEnq4OqVlzXLGWDnwzJ

y0GuHmGPsqocsYSfwXB5yf2l213vkGXXxaxEDamZSnfnN8w+kLmpGcXzC4HxrDwV/ntY4KU7h2GTpHxJ

F3fYZShBNbwVFqZWygWt8g7sFgc+1BZZLZv5tKzAPJ
z7d0NTegi4tdSKbjeSiwL2uPpZ2lJmKw6MrXOc4jCmmMPXL0LKFFUHKZrso8umgp9f6e1jB3fQf+6Em5

GVf8+Bjes0uQoKWuhduKpr4xsqTGdYaaVkdEWgnuzEi2LgJB+Dioe2O2uEREBY4MYKiBkpTD0LBTouzc

er1i9FdQCjQtFiIFPezRnB05qA7nvP00jwFAqDPJ/n
jZHC8kCj6TckWNoi9CfvK669eo9D/LSRwV34P0SRSpQtup2p0P8UBZ526qJkkm9seB89+FmMh4Onjv5H

VREvNoXx0QegbYqyS+Ndgafrd9n66M+skVyDYDAgmj8JrGHtdGzowPlOC+OZBajXnQj/IdPpzHrtB+mb

K27X559KeCMsdJrrk5oQgN25jOcgSgJeK7pPxRN306
xc4jjJu+BQF5RdeyAsjE1aoKprMt2Skljk1PLfQcxlq2/KpH6ErBx4GW6CDTY5BD1MEjz+/cxtdSFMsN

kEAOfwsBcKGplGXQWk2PD/cw1q4dz9/u+4+4fmSQ7a9LejDGvj6fW66dt+8rDWJHFxPMx1QasDsUfBKV

BMRrWU0eKPqsMQXz+kAqxINppF3SsfWcLRlbZgPKWF
O9jSZ/3Sznt6Ai08ougWx8qQgmt7ugs7QdxAlX3HkGQ+NRnhAC6avxxLZ02WREl7F+jawQQqvMfSvI8I

LtQbY0qaVf+XuFn7Nkodva2ikDVx1KtmfWpTvYTWqwOLOsnk3A2QZHVPPrcKJyesBH4I+N/uTolBLOZi

Vh83KbJVZiWajlPpmfI0R7RvgiUpQ8w3GWdduTJOBy
RvF9zMbefLlD2xiltMexCT1JnR8EjjYqyJofiSJMgGUTkzvnkovJ32g9SrQDshalt+QvLJOlWKNePJ8k

zOyZQ2WGjOU9MUMEIgs2jCdK9zCZSNOHxQ9Uo2BB6OtpglBpu+mo+1gludoksZ+C0dmd1rQVEs5sFMR3

AlREp6EwrMmxtigomZQ4LCjsfvzfPaXmdQCosYS9C0
h1V7UC0WDrd46G49WLZYO4aAeH5lThpFRKjK3v6fFjn1Ytv8Wtkf/SPmmOHHoufFXSNZOzg01NtyMt+m

PCYhskO4H0cGBhYskftVSrH9exXWiQXNzDiSmhr1KrQ+nsAyxz9wLNOR+Z7toVnsRQ8+t/vOfP/14Bio

xaIUL3kI7oD8szCXvYcmDVLst/8FriPSuQVRCyJm6I
hNUCAeBF/fobQKFv/DvYTATSnYIbY8y94AIGFD1lNght+CqBZI9ZX0bw7RTPuHpEGe9GjvmfUSX7zZgG

G+ylSTqHKrRMrPzWfvMfYlGY5GJzdbdn2gkjgaUqtalO19IbCBmRIO0KiYOAdupqOlxZRkw2ayCQATwa

ZbaxOgPxk3lBhIQ47JaJpR7J7FyZrrMnnC5dJFe7Nj
2I761HOQHnQpfSdYYIxSZoXocyiW9wpPAWCV5zUml+cquWgsE2xkVASKssVTL+qM4nVI/o8VxLhp6uYd

DGBKFqFqH4jTS6S3z7zQfXtEHrBeVGITHzM5etUrpxR1MlPYvmNMadk4cQnGEdIuVJq9jikaJL/kuB9a

Z9yiomw50NUwfzURn9P0Pk/xfZXTYTTrnpdLaNwEco
UKgc4lYAD86bkGrPw4pg7D2aHmuZT5m2HT+Vaa8doxNNSBOTzCmkzr0kJwNMoORJJNzUDE/gWMn2fskU

ew2w58E9uBQgV561dRaNLW0rNO83tBvOPDVcBb1iW2CgLDREUVPH1czZLzPAc3S/dhWP5wLrNEHdtweF

eHYOtoIXjc9sJcRd9GMYiHB/qmnA5Bs9TEhj5qOokL
hFUMGvjYFMYyIhhOlQZPF9+YvKB4iEAfsdaAx6VXd2JQst0SDELMAeh8C3NjbzoiDdPgWmvBkofAeOVY

FDf8sNYBXoTiW8j/XkrrwVe+4qP9q4V34DAV8oQ15pepe/a9F5LmGtCkr0uAr3G8HKO2QSNNiDK6qhwV

NCdx76DosUFSmV6qbyWlOtdOPMqq3PKQLc7osJlmBv
5PF/dpK4Whu709VEkdxFW3UrNl8uDvx2rC7tLXs2x/cpDBSmtXXGRVrQ1jCukifijM3cdLQVCXMNvIpe

bbfgMslwhASTXrTBUxMWrOqA9BUgsXFuA76uNBqtyKgUJOc69oWKO11umG64VL8gLkxaX2rX6ayKIWNR

ct5oSWzkzSPECN23A2Ew16eWiYGcMB9m3hOTexe9+B
LLnq2nthztSLX17sRDQiMnfhm7QWk+e2nXmAvnvUdiNBEEUabTrQSjwkKTUoVz1L2jXxVdnDADB8wlqs

MfJzP4Y1IcRfVZuWoQvjBRrUcmhGdxKBckT1uOJlluCX5TMFr3jY2lvg6u8WGq70Pwp0K5sIYe9Qbryx

iI4TZ417xjgjLm6YfbpRgfPqLyBRPb0JiumdQGLjaw
HKSzLZmX8m/5qE7r7u+sVrlKZXOdHHSdnaHhHXzu+E2sthjqpi6IK9eZDAg187qk0aJHg9gE69Sollqm

qljJiVCiIrfSPcvFs+kzPm2DOi3xCEX0XIcEkLXIeMpia0X9Eb9Qjhr2dXCqkdhZroxyv71arG0MpfHQ

Rwlsoutl38q0pIpMHd4C033mSr1pYQrKtZRUl0josF
E287kyutbYhm98kMNPvza5+uwtkM63CEBzk3PFTpHks1BQOM3jjuAbt8bQt7dZlyAxz9mDd+J0k5Wltv

bTDYpPw5Glbt2HRaqFQUea0SDDQZJ/YTggMD+k9mOadfEjhS05EIGl8DvsyhRsX5nj75VOoUB4T73U+7

a0fYCddnCrLntGDPVxn0X9hTKFMpbi/+TQT5iA10BM
2Qv8R5+chhre11hu1JdgLM8CO/ZgaDFlqBcgx7YKAh4EHPb4GytOoWu6fS5sKv5xeHvZuBtozORenE5w

tzMt2xw5Wl0N84Mx5xgd5PyH4OTtA+efhMdHywYVs1MqDXezzpmGV0s+y6eHlBRP38HAqu9p3dK42bOi

4ZCkPNvbOU6koC64GQiZlW2K0eNYLO/EBgidXk+k4Y
E7CrPuUPlTRq/pLmsO5cOpxNoyG4djN89ESGixQTik/pYsZ40hF82h6AA5TEITskdkL5Gw4a1IKf3B7r

vVzQHCkvnYtEZrbD96pcwYBV+GIRhAY8kY5jDNBB/50NfOrFWsnSgK/uj5XZQuIsQMPeSoEAp9Cg+Ic2

hxjjODIYod+w3wkz9u54HGnG+bKAmLuY/7xoPhJtp5
uCm0tkpifvvJL/MMx/WH5kA2SAwm4BaBLTnkIgZZDe1L49zxIRAY+h00tde03da56AVklY1BPUA8yZB6

BWktszKBWFmb7HGRulEbYBfBVG5m4OUNFcWphpyrOo29a0bs1LtFoEvwhYQ7ltj1rfALUQP7psgtUHj8

f6ys9p5TnuMvykqt1k+k0zWdTQeM5vTmtKOgIvkXuJ
RX8UMnlso0dPQoXgtuKaNwEa1FcMDO9ij1mPjCubb0oSV3WkO6XX7F+wxK9jj33fuHqVK1/qtnBB/hl8

tmjSm+bSGoe4sAmjG9VauOYCxRc7dbOfC083hHAHw1XlNfS/p+NDYCRsoRCH6xPH6dZxqLQ/JniWqRBM

qXeMgA6pEqWSFMIl9tBtnLquQtP9US3ZRvv08sCXg9
rSYQcemzhXtsOpgs1BTeWAKbNSpdS4B/fR9Daj73ZgC5RUX+CkRMIoTREzY3Z+hwyrWZ+AwKrObiy+ms

6LvnAnzsYvkyi8fnzL9vRcioEd/ktMZ5uPPvskbI9zXPxXjffIhzutec8KbdVm61VAr17sqJ773Otw+y

rHVa0JmdDONVcIy05klUrlxM0+5qiyiBmTQ7hZJWM6
s8hzei2KAe0oA8xiHcQO6ITMRfM6C5072rfignHlka4z6Qi7yDGBxjVzxeRuXT3+EVt1iHUgq0GxMgA/

DzJ6qqbTkR4b9g/dQ+vijoWtgF6BzLaReuWY4Yb+Y+sgt8Pru0qs0pSsLUq7z42/MjYzX74AYpQRvY5j

9dh9fjsHicDluEVsyGYp1FPUZu0dFkGGxJ9hzlUAOY
tRYtLJLyHReN+nYt9Gb8cy5pX+C6dNRld/ZtZHF7Q9hBQp61QHldU4pMHMOZGwWJGFw3CBK89X2cJD1D

PEaXImH3DqWnoQDyZujcGLxI+tgM6LqtDJwzTMJVB5/EndKZmadkjKmbNJbB2dJqlaD9TgPMdJfRGkNy

BOusZTGsZLQ8XvOuaGMfSooCBBerFElOg2tKRvc97M
f1KA2ZospMspaSSbKGGoyn5yxzgZZwHYjC7rwitil0LHODZsZCK0Yb5CxYVEUXYrJjglWIcORwLVFpTV

LAnizPU/gaVjCrFwGLaYHadAsgNTLFZux7Or/uJXdprZjkwe3X33hIBwGi441gZWSjVvrZvgW1xQsJ3h

91mFpFbPQQTRdWrFTHEDQhYKLarUsA9aT2TCsWPn1X
Ua/vPt/e2/y068b3smiP+FFXnIMD2A+AIzz0eSjPY26Bfbup3VquLX/eWqcIGoQZZJtCdsEM8NVxM0a+

d6lNq5tX/7z86Z5ZGgIUW9SKWLhN8uTQcYvD8j0pmSqsKMLDdfzXhAjhgF4iAFNHDpc+4iMosXS/zNna

pTHPju4YGxsxPTGajyC20Hpk850MRbi8s8GenBPafo
YRcpD7IxiUyRYVB0P2ht/RYbTVnNQEa3ClVkP3ZU6FTt3uXv35t4bSyoO/agpaHh+7/6MB5gvftj+jPx

7T/HjHzxXqIOcH9Uo/f5TVk9FqAczKj7FK2e6WzRf33ZPb6Akds1M7dGBwU63cLQx29ppImsgMRg4a+T

tC3FM+TcTwzn2BA+ALw3NxNQJfkT3w5V8hNz7DYKz3
tFzIHDAj3zcI6x58jvD61WATpqsqd8USoh8DAZdbkhTmixbUqu1zZRyAiKKT/vXa/huPmZNKCtVYaw+6

3XpbiXiIV6JMUMwtr0aVbo7y4qJMI8YTt4I4RbCMqRy+ebkmiszWaqN85J7s4dpHCXuxFCIB6/GQBuf1

Ts2KhvdJGm+0NbDVs7zAxS4PgA6J/3WNSEd8tLZCjD
S/meng/pGil8Bz9XqU1bDHxjAF1TTdB3HN3J+xWoOmvClBtFzwgfAGy6AYjgtO959atk5YczBPLvs0kZG0

obEZH16sQD0nNcukNUYOO4DEw5b/7IVhKAczsn0QTmFuhk1XKcKyZbuDEPo/MrrEvUcr99Qgzkpe2oxz

zkQHXwp7Nz+1wdNARUD3/8vkeBL0vqm5Ht6oS898Jv
R2QhnwFDKo/57jKRbJdRq8vBnGLttvhMKgccCp3YLFy4JkfGqx8lxvsE+1mz7OftRttyqg5okiN2suIu

A1+ak066qf05pATcNXXnrMJ8vSWNkNFA4XVJkhxWfYsedXvEjwNvlsxEgGNI/PueFm9XdgdRa0SpjSrx

xYUwWq3jvbn+XMN/aYdIjVt8l1hClF2kpp8TOE1J72
Dfk+WYX/g50ZG4wpg5yQ17bPgZAwDqK+NtEQVBYsvD8eXptxd0qa5Bwc1/UTa7RxoHHg+9T/g1NvVs9d

tQPfgLkBv+6lY0voA3/xFH7qU7/2w0ejj8iNf09C/s7QVse1UDiC8e4Oq8QDcSEEiCtttMZ22RZwAO9z

NUqXjX0x7/HLnyNCu6ssjcwsuoiQHlztAYmOAs2n8a
n4OwcaCA5DSAdIeEJSJbFLgNXNKuOgku1Yj5QDykcl/dJW2XqMhOvTFrCjvGHl7vTNLfHOBrFjnNDs86

Gwqhr/HY8eI/BOScL6GW+qgAZhFdVpCcfVQooBsC1cpDM2Y2XXOTlfn3oEthsxhqJNoZaNP7wRLNc4B2

4HhxjOfcIty1dYoN2Eb3v5GWA9uIG+w6LRnyVSOC3H
AtOhAU+VssxHdxNMxNKZtJl3XPygvKnE5vxdNsyxWCU+iBBWrVldlxonv8IgZ1Bn+yR37DQSBHz4pkOX

4/DqX891UFdbRNwqyexCVBjlCQHGBXD9ZwnC1aJTtJ9hrmJ97xwd503FUkweCGcIRM3nZRSzbdcW/Zm1

Lkq7rrsfzv6Ska30bDsXMJxDSgOaHQAMQquIlQppWG
9sojoRPKY0wwprHp5+9i0FGoS5W2AIkOZi3o5/StV67jyIlUyB+/k3rCdr6qaBkkehW6cOtq9m8a5O5b

mhZJcR2dqX35uNN6PEqaeW1DzP30cVFq10IEzhK6Nrvdt1vppZyo0u3WQPwfeVD2Y/AKE40wlEhXHP5w

vwlFK/qBOQTRrn7Ae4LdAd9ZRKDTDUWNxEadiYfMzw
mhzIroiXizwyjs0Jqo+081Vrr+Us51/rImitr9650oyRxR/4NHG2rxz5ToUaBJG5MavxBR1bn+WnA+HA

yZ9GvPuyTSGgrpGeD2O14R7hmfKSgy2SSm+bJ5mfl2mnKOJtZi3lDSGeskHPa/oNYZcorpo+0MIU/74z

B7g2KPSu3L8HiVlkLtmi4lTPo5N3QcajU8GH9HzPO6
5ju+rjQj5aXXZDxWGLLhEk0bGEbb6rztDIpYD+vbA0O/Q3xoRyeCQq12of5ouOsW/TYdStHlv3dIKpDV

NwbU40tNTgCkk70a+ul5/QnfmyAYd4cbEGzpsmbDvv2oMf0gHS1uRfDfzOM4i1hR1cjZ9hchrX0Ty96H

yA83awrKSfru/MFGz4iD6Q1o5mXYuONSVdMRURqeUo
xcGnqwN6DymHGH0HFduhVaBe2JSpW/PXnDG+tp5PeQiz3KTsWb3wznEK6r3bVgvDoAKX7ls5j3TQ49PQ

8O50/TvW9FN3hG2bIDtchjR7PtdFRyaXwUEdl0Fxi69IfYL4dtFD9J8IpCsQ4zgMBYdtZdwA+UDRUcZV

bKOzOqKWiLebyXkV3zu10g0mdT96PjuUQUqudA1OwL
eMxGK9h8pzehabDoUtx7jXBqB78e2Wqv3n0YRXdAGs+BjU0IUp7YUXAVOWOAh2o2+O39E2ndgV7dK+Zf

3P4Zipv2xfEq9N9n8Yqr2Bu1LvvttfclUNnP21xpnErSldcGREYwzrcMirmkxwZIBMcdoUHHhgnaneu5

pfZkHmzlvCUZSSO/g3Hal/VlYYWnHHHKM2i9S3nlUm
DyuKfZs1tjAkPy+XSQgXxCtvfJcq6m1Ap3J4fPYHMYSUmhNE7w5SeLRftSbRkfKRw95JPwVFhoMJ+3fY

rVQAJ/lMOoIXoB2YK/4jY9TKXEza/sgaazhOpdM7wm+l5G4cGk3z1/bSGBfSu/MLtozYrT85QF5DJ02W

xXh2d42uk/y8VZSkV2kOEB+hWlYWNNGCP0aX2tGu4i
N+Q0l0f6RsOrYDLwGOGPsq0cwG66/0zK7V4ZLDd+wZq43CUx/clL6rwbS6n9kmPDlW/HK6blSmyIgdOa

/p0vH+LJI/m7PYvfu8EwTxCjakQJCbF4A0LDFS6M2h+ew68uj4dGRr10F1h/7B2gg26qgu0P9yrz0pjk

X0XxMH0D7lFNsan9H0QexZTeFkBLp2CQsFgDtz419K
F3pUGj+mZxs+Skd70KMwQxaWdD9VwcM0wnquXMMzZAMv/jdOrWe07pRrzjP2Dpb0UJTeriY8Z/a1iNJn

P5PcPnnDs6+s1ZLpTd9Ssq1774NP2LxrZ09K+J9qx6EGPeEdktPGsRR1VZdd1hSHP3HB67yLU6gJLOyh

ECo7tdz08ARZFSJBNMrRJvCxQgwzoJyWmk/bxPVEvB
jMv6rBKO+EDrD1fQ6MwOeIFl+GGiwvSkMeUCthOl9BQbEZfIBwVlKoaulymwftLiHoJ5XYS2gHyLW6Kc

ksCuL8f3IFbZUF515DEsE2ecAZC3hI4CB6aza6Y7oK3gvFk0rgOvpOfw9WBTw4ORv8F0WVwE0FGlJQll

FcmUAOBo3DKQk2ATCn3y/w6hfysMxrkPDV0KmeTGuz
RTzRmLMQ0EG31MA0s9jJVOxoz8W1XTklYjqLlAAQfvOII9OAODgHai8GycbfT43KHmHs+kOFlnfYRUR5

j1T72uN4bATBabdrECh/135a/UfyrpP/4EWa4viwnofTzV6C/QPRLdT8jOjXa2rhqtx/3kwSKx0C+vov

lymkOqvUAr0yLnJ1gPCl+4wNzx/pXG2l0255zCHseA
zPpBU2QDhxjU65fCnagRQrAUaNquyiYu/JhxNpZvRrlMH/qWr16JhEUvhwoMDAdriNb92Q7syuvnzhI6

zTTPLwzeYo0nQGpJt8z/EYxA+0WkFZwqgqLJz1G2P/InWyC5hsbJ9dDxza2WSqRKHfrs2CBF4X2MW/IS

Va4guJTutqmGU6nh1hvEtmbNh8YHdJ/4wz9IMmMahv
VZR9Q0pe1sDgC4mDHFzXz5a+pIkoQAW6MkS6uaZtuNlJqtKpEj+EROCSn7eu6VsnnrpHxXEqwTtnQ9AN

mzMofVDuiaOkDHfG8/jlGAdlJw0BaqriavKlf22C/7bkgJCAh8UIKbQK3DZtaZy/uC3WUqp77rcSDGr9

qextTh825sFSDHigce3YV7MTetId3FPVcHSXweaV6W
VX0K9dzgXbAhQBDowaBHtlGl8aDTdYeoLH5eMv+ywye75nA8ifTqhDTdK49Ea8mM/VkX/7YEknUy4WJT

76GvXQaFvJUzWTwN0tAi2cxi6QOYaiCd5NTacIn9Ed8j4VZ7Z/desUQ+ra5AQGQVtQQap2XEVyOrIPul

S6IM/TewnEe7eY+QiDgms9q2w/oUZ+bYJ4/7YweY3o
UQgQRGwSAt5fk8LmiGx2N34X5yrHbjJYHIDpFfIGxiJlE8VR1F1crj5NI6dZhF+oz4mTsopZVJFmer+Q

/o2Z113+to+QjAyJ7yvzL7fQA29y0FnagYIFDx4f8Wlx5m5QpL9nbX/uIl5k96VJt3Tdk5T/zM6jgGIJ

P28+rfk079R+Jnn6E/tW+8+u2FIlfewj7WkaOYoilr
qXbicn+LwuKFwt2VOdOUAPAPgROPSgYyllQgvoXFrBD9s+fqm6h2ssHoEv/nJQob+Pw0ux9fUfmxfCTx

7Cx1yfufzulF9X7Rpg67s3/6hXJuPnrOU7NkkCztGiF4VRc1pJXO2NpoB0L2zJkrx3RDr144djGWxa5h

88JIPQepDlPWqcHMLwe9cHOhCH/hPEapZBnRW2OQha
AqNp3goeRKqXFODQh/lGfL6C2foV5zDcUfAmffyU1xgS/76feVNvNymsl3qJWrCCh3W2Wnsg0ZN/Th3N

7SI8ZsZE7mQtUnR98ss7c8cPA0HPLbnhaa2rOTfGEbCASbe4OJVZYD7bWL4dJO35ySMi9ak6IWbaPGGt

gi543nsAQeI0UnL0YqPzVIpvNCZQpsnUfspIB14HmG
ldmxmJYrEMUW8A1IjoOB0+5heyAUzwbznqdlygt9QeKckZtQzo56TG6mPprztzNgahrZli6xlzFOERgX

svGyFN2UOSfYBUJKzcRx9GBpbN9MDpSXZmlrsHQreCALbq5pkZUbHqk1hX85hk9GJqaDBqiFWovDfQP8

KMOflBR5l4KZjrTKGfTjDMAGmvQx5MbmkUPeT1UVXa
30bfNTQPxJ1M2XxmhsiJXTFcr+bmpimNPf729dfQ3Z6m9mgOsy5H/mZtJiVH9NCThv8uK/psu9saUhob

rafweeyh//rEoMv/2qhGKOXBbTsoS2kpXtb9DYe54vFEH8zosznNIFYkm7/rm9VMrCkIuv5d67TeETqC

XGpdjqNV7tm0Lu4AqkuAWjMjq30mcuPB6JaAqPuMjl
1i8YtAPFD4nSb/2AxHZiViEvvYGT/V6GemCmYVvZtxa98hOLRMQ7sAa+RjxeDpzIS5G2cDUvwOnr7gpz

Q0HCVhF9tbMNdbcAwXWhAGtyNKKH3YDsllMuwIXIsp8UQZNTAd6qntOSp1evb4wz5TAgcNWvO1sGDXCX

3CGD6jI615fEx36FB/IP+3YIU9XtlNWo9wYASapxqx
HRujnvvbm27St3PhKJC0s1z8VodhL6ZpCHI0yZF8VI83mUdWzcSR6QhBsHJrIDecujVNiAKvaizrhWOJ

CIa952FtEWR/YtMlLvGUKXKplgiVhflSrayVZPmfSnyp/PKKnA8aHGVqqReD/Ua5fd0ol/0KSmq3hPI8

CXaTExJEQPL1sQ0C2xpXrlaajLUhA1e2EwhRINw1x4
IBtx6nUwpj0iXw81c7G9RICFZFcQxJ71obRYkoF9AtbEP42D4jqDN1a3WwCipYXe/dMGCoMY55VeKcjZ

Ey71PmBqcwzOHen2enHJ7E3MJr2rcZcW3BXQxzGgFubIF3f1ozM1SqTRMs+7iwMesSKsPKvx1aqc5n4z

0suMo+8Eh6YXGfGkixQLtzX7NLF9JhG1xH0cm3TXck
xmVlSa6gExl5fzmzY3QlrbWBXn3jkYeb4M18Lci1tzL+Fqx0l34pp6HH1p/aHTB4kJR2E2PixNsPtRaQ

DTKs9BEm1Ep5K+vPMsu67IQJTYXznaQ8xJtZ746abY5s5GdiaxbhdhAAV43CUsWHnNI/A3aAg1n8f5sz

Zo9U/6x8A9bomjqjlCtSb/JHD3iL/qHN/+ftZJt6aP
P1pDvsBuOthp3xjDQL3u5jNbtQ5hC/MtBJrGVIBe7sGhKx9HmFBebPZyd5B9jqa+UPt+e27Wlxqs/AzC

3ea4zUr5iEtObxB9+8A+fVG+D98/Xj6Lm+4NXb9zFlaTak2S1kcAXBycSPhNbTnDSrCRzbacAfVOUIwd

pW/Jvoaubofs1MIyfJg5BdNfWYNkCAraN4KlcAFurQ
Mkn2VbIYP+awSnXBT3K1X9tTbazZpSMw3hsG8tlhqpGyHIQnkZHTflBdft544sOhH4KgVWnC5AKmABRa

4JdXZgkhIo9DKCGLjBiTO3zBO9fQXlnnrLxuZ7wh3swVz+amd8VGrM4MzGI42nPKI25v7F7qgOaeEoo5

mjIqQdHACTmOrXziMdLsRvm8ygw4YMcVcTrQRX3rhO
oSpSK6MVzwZQdkogErgiQdMalITV814ALK/0dEMR5wAGWflLolFU2afK6XmxtK0IZHECqbdFhUK+x3DK

VranvQd/7MrCeE11ylLF726hFL5RW5YlSiA4N5TOv9uLVIcQUFDZCbB9ApKHxIQb/VIxL494FDzM1Jw1

X3VTSgqfZgsMevwkP/sHK5HkgPrekK3rkUr94dtvxx
yxHnBiYu/2NopSTGm9J9MvarV9Uxle6LR42POk8++OnHE8E2dboip/ZoA4b1yITOXMh58wph/US88Mc0

23/qawIOJkBu9CCR/GaDtFKtkXseDfNQyn1TezWZ5oGnrhS3+atMrkHqe9Wl4mhO16+YXCrkLhpriJwn

oukZb8SdkGbhh+GImpNdSn36y5XNwPx4y3jQQmEOWy
N7IgsVHEIehKGUTdQ8yjAn455CRx3xPeWb+iQhX1Ul9hYR/k6RjYLvRXrKx052C317rX5s0bOwvMrUxd

pI42NoGv97Olj8APzhuh4jnsLXFjGVLsqvaJfLZBQO3EgrOKVUeLR4Kw6kCMshCuiB2MBh4yNLZKVrVi

H9UeZu10T1rjH10c76CsDUco5TT7msHRnuwNoaw3Qd
sFaeIgGTDOjSFwgbHVCEJzXctT618ywCOSs/Nwa3cy2D1zFaHhw9tsRRYuzfTT6JNnNhTTPB5/ojHoF4

Hr1DyDj3IdAY8jG0sw52o+vXSJmTGnmPk0lotFwSt5p2Z6iwfCEYRmRXsXi4JCR8EjJ7j0OV/IftkAL/

IJJntMXzMNFdYCNhXSUvc9T/bXP6ZJuomwso0WMzIu
xfmxAVvNs66Bm1KLNZdpAyOG3wsE6RpoORkZK/FCtmIIr0pLs5g7g1OvCme38uOrPV67fYFvz6A9mbIU

0RgABrJGiRyZTCKriYHN8QjI0Z7itokHQkvRZjj944a5VBCpLgnOiZntc5UJCMeVh90PcIhx2yOvQXfL

00K9ypCZ9sptHByPa6etApg7r5+Xriqzc1ID0Riwmk
zoMOdBWSxSjm9GfNcC98sIp2hbiNgfvQw0//fFGucme6Gmr3/f/815eSgxSspfGPm/c9zX/Rh9M6ZArN

ka5OeMVHc9dw/f9kVxv0FBUDCdqlQsjDxsM1+u7lEuE1dggHhr7UPfVCgTKOuzfFNvLIRqpLIHzu/Q95

pCynB8tn5QL8NLFOQDzrex5RVUcnpSQpm59Opa+qeQ
+FI4m36Og93yQ8Ne4qyXNWjkansZogHoyyceItF0+tXvdoww+PlIOTQTpsGuTMyC1W6QhXe4d7ZS5wYK

pua2ccShyR/3UAnaPnqWWuYKKN84KmdvwpVX8ZCsT0xh1NUUjonvhCWr5GnE50KH+YAYVxuMmVFubU18

ZinwNESzCeKVjTs2dg0GLkZKnFUcgCPgsPfeiHdKhU
K1umI7HEXjqvNEzi1GIbaSWhlm1vNiilW0dD/n6z3Tpmj4bFujy56DEOnjciC1HBJXS7dgte0JyQ6LGn

ghkq0yj9RuKBHk56vqGYUd43wOR33qZC4MDrBexZuJdo8G5pZRco8Zpbmfrm8hEhUq6uNA4kVHOnsvtd

ThFJqkqdWVhYzNTocMyRvCCKpk2yWPU+IIckjLuNoE
ov9EZ2QzGz/deNvvlZdu8PqVC9ktO2mVcyXKQ5Eazx6wwfCVx1Kftx3ac+/iJ6mWNN97mjKh9oBdYqqs

EarryhbLs8VXKu/yGhxkbb3WWtznZqQOD1YG+tbovU18EwA6bcDWXZNewmWLD3MhWazGUVs0iWIs6KkQ

y693ks7hKjI0oFpebhs03ycZ7m6YD+XCaSyfOpMEbr
3MA4V/d9Ehckqbja6i86yw1dSd3NqLKqivtjw9H5e6c3xs/379bxT7AGmybX+ZX9EYEP+fnOWG/22vJ5

C6RdRZfTx1U7Yc5op2IkYnQh+HrzMkt/n6KR+225McTui1+wS0sxOPTbOLb3yfObr58Ql4JDfWNFuqkk

AjRxd4qSxzj4FHQyjaEKFjYY1R2A1xX1v/woJLDD3C
3M5mXAy7XZXFUPeZfZVOWZeoJfQLNfbvDuYkqWniqIcOxEY1dcu0rv+b9gy9jAmsN7l0sBy8ecdAMEuk

GVs4dMLqbcO5Lxf47P0l3XHIrrjE9oauhWVNqxvjp2WK2QFYqA5c2OjFF+SLriscBXuJYMNrZiw6Ah6y

MAaE8R8gg9qwv20LGK9EfrLYK268qZVlT4lQXDCJk1
QJ8ybfriBw4EGK4hLqciafcczXCSZg8+8wIraFV4XygrC9EtcKB7FPS+PD1zCKJ1toJv69wHkygcrMJb

C7oJjxD0A375NFlJOTrJFQmvbtTkBfIlo8di7xwPMYq6tS5wkCtOOaBhSIuCE7rlMWka6WphjHRdrZLb

baLlNCVP4gO49t3+f8pLH5Qzx5OnaJOWKreyswMLN+
kuTb5EX2ezpH0mcKMUYpVShZjuhLypbMz7uZuzzs5WL0JIxdLSU3MJ/2hUXK9GwM6uX7C+m04SDCkyyv

3Bj+MZzgppgtmFK2mklPD8mS0M7pnX41ijlb6uL3Q8kRmhJUxdJT9J4AL7awIuexHHmNgFk7fbKFTI2O

KSLaqKBsuBOiPUOUwj3b4M5c8fBsZX10ND/KM5+/in
+WBWHlYtiV4UnYVhGK0w+G7f0t8F+uqk5q6ymct840qiU1l3P8gOQJYH/Fy9LvplD3fakqCRfr83jjX/

T4GQyHzova98tK5IaTS5LC27vF87ZrR1Pvmh2YPNNc2NUnpzfC8kgJWUlaurWoSoGhqI2YltxKw+a6d3

qYLOWiqe0wgcXADbC76LZ1JNH4WENwAtaB/UxFva8y
bLVG/Iy/nl2qvApEyPqHKCRBbsbu4BUMrdnGjtvIyGQy3L8+9SUFB0HJd2GNx/v+/CKKDYMax1Kko7EP

9FeeBzn0Xy7WwNFH4ISSUdB8161Bui7lI7cDqc1OTb2K8FLI+FFzrnUY3CCJnCDZsnI+bW5sUJ8HUSo1

xcdyhTL7mMH2xlPjIOgN5vOO4HJ18gCI6hqFr8KPA5
HGrVlpi8qkcJMmVf3ZrgRYJ1GAdTQTkTkwTnbSgJ0wFw2zgFfm+BQbLvk9X02+ZImnblmFVcylDTmWSY

NhwYi2rCrEmzb+naSwHXHGzeyj86I5Zh6f/VNmpcBKq2JEUFN+CHI+yse18WowWeofn1iIrpp7QlbZe0n

IKuzFXlbO3e69LHESq1TtHwXbN9ECm9Wa6Mq8f1Ifv
jmXqi4i2eGhJ101+wx5N11VUQHfLWuxmT/QEMlLcyModjDtcJAl+VuarKNjv1k3Ox5h/HltutvMLAIa8

+o4fWeS6WzQi7fghGdNeDAvAoq8nX6z4b+E+EMvtFNZ6RlpTC07ywy0lCVjwW3P8+hZB60Zn3ajP2w42

W5A7uo2PAmbWwuQY+jose guadalupe/lxe9Girip3mYp2f2sD2oFm
C/YXE7FP4aT7cqwlMO1ELjBvnKbtJQwWA57l8geylPcsOU/bN3JHsWxlh6lcD+exp4pbFeE9D1MVY9Ue

NiHOSmdT+rxoXnghcGbbH4tivVCf4gUaW+CQ3vFW963b/ChsRWo/Gi3FUjdhbYP7dZntQxqWDRR+SqQL

qtrGiUmOjWQDhJ1jbQIxHU8v7IEHCCuz1SElzc6c4t
t6NVsN0faWLCc+hQhwEu7HGZ8NJHCHyvTlBxN4X/qvfJAUij/a0y31qOQxExIbNxTLShUa8t8VHRs/Rh

ev9Lg0QKPLnFel8sHuaTO8lxZ+MxSORROdlSxVzwTEstuGV+UR4woYbAevFoF9WeL03yjXH0rF7lteb3

sathpaoqUcJOZ+USYhnw2mEavS5Not3AMxXg4ge0Xh
CDgJb5YxnMCJLbPDBsHveQ+VjdaaqtQqrxRos1bAnfL/YaNxOAyzkj1iV8eserhFPXRePaFlXara3b0S

Xk7gUZCyj7lh7yhD3isBy9feNRg2ynhZYW+MHCdEknmS50bqnofK3HwG+2QtOVwDAfh3gayKDUYaBzAJ

3b2E8lR2EUsSifKH65uYXeKByVAmob5Ix1QKxxoxe5
kzDeH6FnGaC5Vn9tn/9i/aPv2PoIUmJIa7ByxhyEc312og12C/1U+Y/Nl+WLpYD/yOb//3pyMe16sZct

uXXG4wsVON9f7q/4vsoNKqRur5n6mieZvER7/R/sVQXcVbLrVztXnX2wVwqVAgvVO0sQ5FfAfCL1c

wMvUiMH1AB3K6dHrBhkIvzAfo3doTR9GxxX8MFTj0F
nxjP4bOU0gUn5mD/Sz9431+V5HO3hKfxyrbcKBLpeKe6mfMBmFwuI7HDFh9ry8zi9sVKl2M221H9wU9+

V2XOFhrLMip9JcDChvWgirRjPiq83E3VRa39IaO6djTadkZFOcYwcZpjO3JWO2Evg2SJ9iQ9bcNKtxwA

APZUazr8ZCFU2Z4RFmJj/AMbnnnZMxLjTOY10YOCIk
VrKSIO4btXs++HST5reMgJD0ZnKkv9PJQVN4iNAZJZVvkBuejeKCUoApVrSuDCRx+nKQRnqhLiRePllC

o6VaZjTM3KvTV4ph60JugmtA/kbnz4NKdj5ERoYdOXIe51pW4drgCLqTmYk7lahzL4UAwUU8Ej6hMaQz

e+rEPxvXy7cwt7+GRaT8wQ9Qpigk8KAQf5/kjIX6XZ
bYtVGLxVrkYnxKvehNrnKIfr/J764XL0Q0eNitdAtB4BNzra7fMG2lBvmigmUuEYTkBg5xEfYJh2wntu

MuQXAY9nBZpFE9ubjx3rCmvXchgPpMskMmouBe5aO8ByfNU3JeWpN6om8wVOVXmWD3RoMiJEpW1rgXfE

7OaZOVsU/J39kEbfifTvm3oY75tp6EJXjW38Hn2+P+
an6V+5qEs637t0BDsWdJft4hsDMGp5+AO/6/KG99Rs8J1ulZ+c94I5PIScARed8dc12hvNUnQ17+aH+0

irKbaqKIRzbpK5ufC2ALs1jm6198to+26Kq8fB3K8dXoD64GveYBgpUL1hf6cep7dORapln1eHwRjvX0

638MA7oSDQn0NvUK1JOWWnhoGrujRnQ99AHdQ+MoxN
YiUpDhZKjKrkJJO3Lf76v9zq1FN0+g3LLBjWKNcwXMtAO1mQOjfaygBO9R51OkhsZmGa6ZLI4m/2tZ0n

oY0X+RtMqp8Wj6mQiAurkAf3+5fCvB+G6zRAnvx6C2R19ymAkcDR5reH60UFLJMMsZ0eVt16V/7WSPsl

RY+qzUKog5k8VVeYT6f4uQ0ASSN00qVOY8yATZwf/O
qA9K4vTAZEfb0uwcUEofsjRShU1iNpmV67src8K1hdpvwOzXx7Ve90ReyQ98JdWpYn7En5zda600NQ4E

RlURVZw2sRWc2sN87c0i9w/5CNLP/CXgeclSgZ53PKX1TzGVQUcAu27xyBYdG3eyedz8qxu6sPsFu7QF

5TE9wuG4mjzLVWCNzwz2eXKkR+dWUZkTFWjY4dJBXl
c4Oh6/I2U2BIK/Q6mxrWCMzHCUOnwLh4G0npBI51ekXN3G8itjAq+rBYUO3ycoNtlT5yKTvOb0qRpIlK

QMXDDesiQiJffa1ubJTe1z5ATyMPylAwC8PgF/n5hy+2G2duBBPCtkAssNQSgt8oUE6xKRcmKXP9wIwD

dO3E2h1X0aUgBu4gnxgtSVXNcz/wc5J2nQ/8HTyu6F
W481r57jbh+UjLWV2DY28ARPueWfjb1y2lWmj6pKwA76pVJDbi2uY1NftafzXZqae82hHg1qllVjZ5KH

+ZeSYaZbEBV+eEjC2tsVnf8trMjS3hmgymXaqq17Cjk6If9PwpPdoQaIrXmXnYM7B02B+dlLkrEgV0JN

ugJYa2kuGodOJSbNQ7Xf3oMI5vQEK11NqD3IPKiHC3
ChFCo6GuSjgiE+iKVFcpfou2oa8v3aG94G6xEvYXbWxyeyWkA/V9vonpDnlGPomJGdoV77lFJTtZKOJi

s2+UZxiLdMOTXZRbmGANZhBMpsoZxjrrTLCvETpZnjnQGHiT0Ja1aLxZgcdeyhBokp8IQQY14g9dJI0d

z5olER78sDAtPwTC9SW59sBkWIqZYeD7Hm+r1ED7JV
V/q68v6WiOC9x9UaoaZIS2R7fa/jtJsu4/kSrqnsmf+PV+xv/ImxvlcJOm2kwoee8KsVVTXzXS1BJ0DP

xUGn51QjWKyFbqk0kZVGyI+tz35ix290qJ8sT0WSx5PrRXYmcWQeUBvZL9HTPLhafFlyDxX55MkBJKdx

9GLhHMZwlbSKYD3aYHr7PRCO9Bh82zh4pRDUR7/vF0
/3LK28/8XhmOD/l+KOD0R8pe8d45uV6nrPdpaoPn4Wyr71JA5+6+k+z0lVPKBROEGD3OkqC1ML9eHRNM

68kLHowP+zoIuXUVWf0GglGdF9qN2Bwv9Zl62ZHThjmaGxPBWe3sE3mqou1jhJt5dt6JsLDw70p0akjg

0i3Wx1aKfk0xVuim4uYx7rnbrewbDbofss5WCfsSGi
l6W9JaESJO0Do8qwE0V/AeHnltG/nP3oIWPLz7IEosVfGjUA6qaAx1YWqo4Cq7BYJa+VvHHlO7gjkhBc

W2LhoHNjpT4OwLSEXlC1vO8VoRFV13v4uZFDLnN6VIye2GV1LfpzkfKTaBqzwcgGuYjK1wvHJ5ONDvl+

z1RN4FBxmQVFHY9o2Z7cW2xo+6TX6IOXYIIGEX5FPr
lhBec9dZ0TEWjXix+bAmGD5zO6VQR5B9iKuoa11sI3jrTXS6d/YqkjRzi7HS3LjySWdZQKJvPSA7XN7p

lHsYxCDwUGZj9CHztJSQqwopfsQjsLJhitrDqELAi1DRamw8ePgTwmO1akO0sPYc9VpP2V6aX2l0DMHO

SzDn+l5xnRpUZ2y1M3Felu56EC2Lx4HZK/t9D0X1Cc
P/R06uB85DV+fPz/jYMKcV/53bNI4ldQf2+kZ7DzZ/+QWp6Zd2zPJhQrV2typVKy7sH3s18QM25jguYr

I4//aQjn448jaFpxNB3TEkrQvwYfEZcSBtzMyn4YVcTwz+4JRVNnZeZssSujkiJbeyhkFsAR4g1ftg1+

TYRmT/q1CDaPS/k2Hx3il/akmW3/hoq42JJ4CvWULi
JRAz5wamwGdMvXb4V0o8jQhRgcRQYlgc9UzFstC+Aa/wimJ/D0nTw6uuYeaEXysoQE840zQ483sKiHy9

Ojd3MkV8wuJWnoyF8z7EWTcL5s5j3TtpJ2nYhaGiTXJgb3zsRdnGijU/8z72peHluqG6FcV4vT8+/PTK

egb5O+O9K8zYoueg5S9VXIKsUlUWeGU3hU9p6ZZqF9
c2A+yxv5QaDP6LzBgStFJTrvuYMvlHpk8OWIW9srnbHlrBzgsZri3xCZm0bFX0LcRWvVxj+D+16QnGKv

g7U9gK9hKKcrUyTevPbmqpoKpVGQZ5LlOlUxCXOcv7xsezwnrh8+62ryIkylF5EcnJU2tcPGB8SRYJKN

27aJOiuIrSQLBKU3tKcyi7niJv7xGVgx2vBXpXnlyK
pFkJN6ikjRaoA6KCEP3akDMax90Y2x7NV/saArR/kt9f2gxfn0MlffEa/JhUYqLmM2pS+7i0RdxCMmL+

HLksFGW1ZvWa+FQD21855xh17r6AzcOoNBgp/d+79LemC2DJPLcVRdZbOvMBPbbI2wGUuSckXgDiro63

mKENfHpiNdJ/qX7AivoffS8Zh6PfNixqwKx1wQvm8W
4mzQFdlD3ilY+iU8nGI31D1/8KtLRK/aqlvSDxf4XpvkIdgX5ajju1t9UXtbhhhZLku4TV6sszY/h1Dj

uzk+VWPGViEGrKFaRRg+QfG9Ft+GeMOOE161/LkChYlnFQxdBLmwbG2h705gIXEFuaH9fjk0fMG+UaTI

9YKEDgJkvqJGTFywIjn75m2L4r3Fl6M5Rbj4OndWfF
wDJwllKEJqVgfJ6OTGBjXmBsRP5k5miQ0fzcrW+5bhSU88A7vVVXXaEJhMmkIpy3/libE7kJJj9VNUzE

8l2UzOZGlD8VQUrzySN3iUsLLjFJ/63H/GhRyXN6W+zcS+Nru9YWm8rIOXCOCAJfgFFgpAKywGbdvWOX

jgEMTg8YfnWZdozteimJ4YFiyA4lm4B2gdUPje1adg
xZ7MaKJ9L2lnW8On2ZuyGHOJchgi83T+Kg0tFzVcW9jfcQRIbf3YFkuKomHcOOqdaoezASETYSyPIanr

WaUlYYDe39L30MDrLqZw4VsPWkd4j4Dz24xLh1zk2sQnCFE+6XNSiNmJGHtoYQ1QJk7KHmx9L/XdWoDI

6XFD2l6HxmCMQX8HRV5xs2QP7Snvj+JPKU+Z+M7Jg3
+0xUt/P/wrQOU/lDzXD+CvZEu7gX0JQVbrpLdUN9xXKX+o37fzdIC/68k2/VipTr5UpU0Ib7jCupsATd

a+QCnQqzABKIrdlw3xiXM7mJ2UlLpnPJoCH67G3kcvmyHSDaIo9j8TWupuoG122oFwFYpRGc9/aOfx6T

0fH+yxt2MlLQ0ZLUenEJfVzqPYRrQaAy8g0vVMKDiF
Q/wqiT+rbrkoFSgR93EF6Bteama49aebJYfkaCh5CVtWOlQaR9Z7wI0Dy8PKuQRr1dqzJ8XpsLrVFEz0

Fn9hdGO2mweNDLzeSRQY4XscuI/aL7DUJvZlfUrQSSFZczeUbcrFrbkd2ZII0MAidzlDPq26hrg2qVlK

o0mVaqNC8uW0n/fZeJ5lW3pHEWPSquFaXzjimCNBF1
WrN+LxIemIwKCzfjiRsGJ2t8mPNBLv4qz5UGnvm+PyF0fHbO/vh6cal53lyT5AdZlhHVtBl4C6BvQYgo

jyT6kAIzF/bJsLOYMZWLhdQe0YMYadLgRqMANWvF8XYq0yMRms82hW3XQEWTLSp2a3s7fqDQLJ/Od0Jv

ux4ukZh/8VXYvsFqgvr1owWe/6H8mdv3FqYXq+obmI
Q63t7F6ShhN1UvCxLR0NQjuQAzpNuti42TCHwhnvXcYerRFRFTIN0fdVF0ywQeBoWQ6OYD88HvKQBfj0

rw4VpxjEOCKngSyGKbuRQ3sf4rha7u/AHcFNbiFc8ZeL/3ov75ya/4ySjpVL9SHzdafGH3j57IzlxY81

SQuMBkwJV4btIU21GU8A+L7xCcod9Vuh5gTE6VUGUx
WzuaUXGMp0YECKPrRQ3bjbOPTrLUlf5sg5aS8TL/pM+jseik0sfqhtVpZNc7VHtTg5qqvZy9GhBZXJtM

TsX3gLlSqFymRVmX1s97nik2FXbCs5pzysJyuzuM2/EyOpLaKtTxlzvPenSLxddEBgniVf77t4tOHLi5

26xuGwY1sZ4mjbSmEOEJzrpt8vZ46EgpAeAg8b7Rw1
saYgl4Zk0ZdFyafwTiRr4enGpoEMnebieXvLVsEU8arEhqe4MLy2wYExOiyCJzqkeFMPXH9pzABP+dwE

NGZ6Z8DSm+vwsV46MSuFmabj7I+ajSbO8DVJRuqtSz9d8tdUjNAdDXW4aZY6xovPiHRNf4+te/Yn/Lu7

Hylxrkgb06uUxD/LL665g4sHB/1isAu3aggObuQ0S8
NSE9UDCHOOygwX61KBbN4n9qlE9hh5+Q/igc9566FtokNbD25oXwjuvIzI9zi9PJT06DOzUHX2lDFwlO

+YXkG80SJDgoo299k6E3RfKksEZX+tebB7p9TcScgEBkwQiGDeJ18LYwAI+Om1AWstO+vW8A4IaEbVVz

kiQgt53MRV5X3U6MgodOj2qGfvx56CvBbSL4bLmh43
y6YsDvRLHiPQI/SEz6mnLryXe0ntL8AdtTHMNe4Pr0YGRwmx+v5oWdPv+6zw5VgS29sD50Y6vcLDV7sy

uKBMOt6rQv1HD9TQSR7OiEpmsxWhy3bELUkwjmb8lWbp6OIZE1xlR3Cx+HnZEaCcqbLrRlgK7fMZG+10

rdfTcxLoz0ix/KXLLCIMSmsuEFosCa7g+zppTdKB/R
+CjQSfmU7joyXK6UZGdoCvyUgmcu0F7tqzRPIP6tkOTQgwMQidIiGAgpUbsjNuyYJWMRRhSqx6dnZS66

mwPvHPwRgRuwScm9YdzFx9Ver4PPAwDx1S1gQkgouwJeDy8k2F3Av/5txRuOuEORRSS3kIU/GN+XGVPH

NCzpfqqdSxgpCW5IqdN4uiiCtjSLGRUEXU0Tqc3RCc
sNK5gATi1No1XzaS5jMrBVF31i/uJIuf/5TlcuO824bpEhEs1KpyX6t6oV7VMMmwwPJGg3E6Ly9xcF5V

SotyO/lSDYM96wwo6GhFJqSh9m58mS3G3gRLdZUYSdRIYFuQLkZ8M20qovsC0CEzWS28RzRpx8p8qQLK

9JjPUTSw4O+Jw8wExiyr0X/aKUnHt4jiGKBBqbPaD9
iO+2jC/XQlpPcUNQ2Ck0xU7A40ZOdmo/PZ1gpIkTTHc0cxwep1J8b2Bk4tD7NMBdiggEiPhzl2gFbDBZ

71xScQWtxh2MVESvpEJ7Q9V/zTSfoqL3uws39kQPhRIhnsIKBSPTyMGpRv1ur9bqjiSL1w3PL7vu/ZUq

BUi6SYNriUQiAaMqgZBTnzbkc4MooBfQMPEOmp9gGK
IyvxemiAjHcKnZSyLwwf7j70cXBWkp3XalMM1sj6fNNKUrR1KSrjN1YPXLni2BIDilYEqLNEZa2YKjze

4x5LvcA1jaGqRvjoY9wqhC0afYPc4+JFTpdNP2EWk1y2Nk5SLc0JDoTFs0ljmLTYy+1UmPb9SFPAt948

BObEaK/fG/FKmaDSgFS45w5i5UBOygx19cslK5oGYh
cB7583hWcSd51ZBryqae1b8aGnWNI5EfccdbV8meYXvYl0RXDM5PPUeIEu3pj2gF5VPKXUv2Juj9oxCg

Cak7liKGlcszX4UpeDw1c7NZl0q5eW3JJkfIUk55gu2lmvLXKZVJ5+tABwIIMUnmPHYJ2p3WFOvN9Q11

SKkTchiEunLSwaFsl1cTkN+qz3fbAEKw+kbni2KECR
P/gR/vaUft/on29nLexP/dnMvZQ8gE6e+AIdXjp5fq/WXns2r6VYoahd5ayxVfap8nkPniTH4aBa+tNy

0m3peVekqadTVZAu2jwPVAVDfHAKB+PEBUwsCbxKHHPxPtLSQvRujARSgQImUXmeSD/sqCfdpBoUfY2S

7jBD3qm1OOZunsI/rvRAsTpxxLY3kin9yVsTqgt6xq
U/NpeP23yb0X3OZeZEwzDPAyxjyqVuthZv41LDFQgQC7fOLbzC1AUDYY2UFuZfuWVZNuMv9wPaL4kpqj

J2QFH41lnrwxK5fK9/chu9ORsa+dvuGKVYzckryVM4CpWzoVMkGXdnH8PKCEV8VYmZHWolJ/OmXglfuo

KLWLsEMmYkPr7eYsvPDOIMsjPuCQGJsv70kSYDtSlO
9Gh8PUnbUMJjwE9hoQb2/7MkxkOW3bVieWsPXRXGL5MZ1F5iOtHNTOHPgoAPCE1QwWDNM7AicVIZk0mN

Hu066fhUI/80b6Nz0SjfjAYmu3tvkaVd2c6NLZ1bmlsOyxj1qB4O2Tun690Qq6AYuCCBPufCmfUQxEKe

fOcEsK3P7XGytNrtTiWNSmu68mMLDyit1RPki0Uzq1
KBoKBqPo1TfUdIYL2BxpWDVdiE0QWlom6ObosKPHr2650+81Ku/9X4exMb6r3KHVXkpCxAoVS1OjI3Uz

y+xCVuVgqf/F8y6QummUE4IM+HTb26GcsZYmEOVnWifY1J0MuuR6xXFYq9jrxR+h6Yx58ENRnpKsky24

PwGWHyGloWjUK2Gxg+W3INDp3WWQm1zDpVo4E25pp8
OoOq9I5eMVGXUOCRSqgCs8Z+ZIiMK8ivwIyIi4sfbwZYWQfl2QEShkPa/NUxgC5xy1oZjHhF3DGFylAG

h37CWVT53ZM6a5UnIvtyoIpAw6hCYCzbs4Q0Na/ShJo2pRNZ+NENVXs/8APD8HiJYOxgvRFVWIMPt2Pq

YMHdTaCZ8ueI9rkRQzs6v9dRGKVFY5MtWRQHf8IJLH
mbG8oDyf7+Lv/PgY4dP51VYmReVF9x8wEcWb4Zl05xOmk1DHLVw51AAASqfCIuZwwUXiNDXPPK9XqtL8

0+nFy1ocq3sdTNTl80IEPpBH1XJjPvpY9ScFJBnYtOEXGRnedQsf/UpyEBUoSI+v++qiwdNBbx2hpubn

8r6EvDUXMAq/3NweMvZsf7SGpp0CBv7AKqVfKPpBiw
/qbNdKEkP8By01TDW6xQEV9kn65QK1s78Z174th/O9UY/JvJSTy5HURE/2OLg/+qLd2HjTP8+T/6Oz4p

WdfItid+R63mPqh12OKIRtxSV1B7IuSmeirg/gXrHm2qU9m/D2P19T0XcLZFMAlQS11K5QrfNQsxouW+

D0ke7CydkGvIoL5CgEc6tRna6OwkvbpnCB3AlAReJ3
Rot1HBaCWXuiCTsKnCDyVaEseTmgCX9nQD+KtJH/QGpUY1idx9roS4W8KemkQPKXt7fCCbDRma83LwVL

iFOUw5cKNicshyx5Xkm0EE6HUZAzBfSg+ga9dNhqOMTE+R74IrS19MlUmeNP52C4qLI5AlmnKpDJYiO3

hq5gT0UGFyBVW5fk/A++YR1aB7dY6Yzm6fukfiJAID
MEYFysekYMh1+by3PWHIfWwrQjwNtOdrYKmAVxK5E2U3e7pZSyciUuyO3oRprmWyfnle6gCxP9fkl8xZ

qdlkAyxkXGdIzFkOzqC0LKwppU4uVWDlYDCgPDyETy6M5Cd5krU0KksRTLKirZUduzCXoOgpqGKbWXAP

sQmVrgS4kWTOvmVfcBJFIuCU2A0z8dLVc2TMuBhYaO
WxEpBT8i/5S7ex+rtuU/Jnyw5z493SUo/i4k6l5VAhP1XrKIGUPoRi0msxFzQl5JMUp/Ob+SB/L9v7aD

nzqV+Amwo+33DIb1KjBW4JgFd8qXriBbxZ2MXx4RcGrJcYWQu5aOed6RpRhbEHhsElwPWZrlXufu3SFf

GWPqbwvEhndqJGnRMqcyUA1d7d+irLQL+ioITT3dyI
lOvZt+1qr8Y6q9odP8pHoFM0M0oq0CJ6rb/XEhcxd+LM1nApefmA1/RwV5sGyMQvMVs041BWXjrIxgzg

Suv2EMpWgj+aDPuL92aRdf2IX7E4/udUgnrdkjmrNSdYmCZntc8pOgc/B6emddb3AmOtoh/TXwpsNItu

gg58B8Y1EtcKcgMRRfvjU0Hjkd7TJqSPA8VjhnY0cy
xuojWARVDJbn0/TnivUdYMtbnLJuEMc8ycIhf3rmfHIOAxYWgc1PO4Qj8eR4Syu1gtZoRLUinhNBY+3o

SQgvfZh4HP7dsddQj0P1yJGD9eGSgMDqAA6Cn7UbtPXQmzx2GZnyuPk7KXA7QJ0/Bvu1Fy312JHCQkIt

qkMQo79HDocfRD+IVUX5SkFRTvwa/vSjlUkrEtlAG1
EPrY1HdMjtDMpfXc/l4L3ygaRJkbe2gJPDxeimWU/UQtTF8W1ZnOdv2hS0il40rjyw5NRdjugoXVuV8U

xH/zSLykbYPOykK3lc/Flk2YD4P3llOl0zhiJ94aAo+74mqQbSq91cGwxnGKBChzaKk+INrjCWmt8Fx3

5VAIUeCK9+TF0Nb7Kz+6WbIOx0c+EYiC0pACN2TnGd
poeeS3P48n/nZVMcz2Nw56RVL5myoZk3ZoxvJiuZSMHr6Es0E46niPUyYz/Z0YQkRlQTj3AXlJeG+jsL

QuoqMimz1ovUUUVzoQtsOex4WY51etW9dOKVwJ9/XXahH45k1nfu0q1uQjpGd7vQ9MfPF43r37M76Dec

Pz1cKxqPuD4LlB4i/khmONizDW6orKlTYUBVwfMuWm
sZ1SWs9QvTzf0ZQEAgeeHHWtp8eGj+elTdoAKS4uo6AgHXPyGBD1EF1J/rae3ZZXRg49oTNu5yq3EUgi

bpGbQnPepNjPju+LU0v03dyyluTJlUiwr3lkru7biy6pJMD1Un+4EX7FuIl8oivq0XkqaNua+GR9oNWn

EsZ49kin61Mdi3amt15ggtKIxYbDz3xgUrcLnPPpwX
3bSvFIQWRWQv3sJ00tKT6MGluNJnl1XrC0fnstKl5/hpEAVUZeKWmt7adGXLxLNW45xgf/wWNKZnSls/

EQYabmCBRos8r2knpH2NM775F9GuohUUHDZXizLSmTKdirWH5snyRUZRrGRlr02VeIwf7lEqWWpWufPC

HyTfogX3G2ayuK9XOirVvx3Pb0KdaslJGGMmZYfwIJ
xAE2+lWvZh4Dvdj21XsIA3rGq539D3DuX4Ov8f8wYVrdzOlOkCvbjBaZeMo4QNs8QZTWo0c9tlGSr9cS

fIxXQMHEXdKE+iMIz8TUvPeuY809n09j/0LdCe/5n+iPWNwT0SY/h06jZ+W3zuUGJnzhe45j2gyeHnPN

dXIKc+iI45sSCUKU6xeFdCpU62iX3qcgbFFt5kH5pI
Vtf5chyuNJZiw00mnxRyP3ExzQSlQAX5lkwjDf8ypdFbOjqPctWRQpS5x59t4Jm1VtCXAkNqK0WurxZO

UOK3Yt223WOjJyOwGi3y9IbaUVYnP7LsdzFDtm4IJPzWL2rq9e60heNexv61Gwx8LMWzVcqS4o2HoYHq

gGnjtcRy55PEyr8SrlYKLGZlvJoRWaVo0y4xjOToYd
KK8H4mQoGzgPce8PhhgyUCwAQwJfcF324EfY0TQ09tANH79rWWZQMSqnRquNgcJhwUZ+foGkjgeZJDHI

JQ2oV3rqqpz9TFEE2SYINC0GV/eRj3MlwEeSNnkz/q9qXgpfwrOZXfGCj7rgYVmgoJgYL2HLeYPytW0O

I/X+yvA+orVBWToetd2IYUJjv2NVoySMLPS1cQ6kF6
YKt5mXdKk9a/91xyHsypiz7bnwljGSwbbJT6SxRTTTacQnbqv4NONcdNb8+NFFYxjKc7y6Lgvf/lXA6+

n/bRSOprNSnhD2qay3RHw95eAlT80dDA+SWtkRl97MLymo7QTC0sZA5Q9FpZDXhe6AMhb6uyAOEqp7/J

qzDkG49v3gMZHqbmaXCpNYOWMHOcF8GHJ/F3xzES9D
o0HwYE4eHRkErNOpAoX3/QT6AyI7dTsygXJpapAk1erG8ppPjqMOZ2d1TZCAkX0vejYwNQVhwSkE6b0d

c3Y4dHkiQX8WR9dwUWOVB+xtFjplfoTH1eMh2PJktiIo4eHffhLF5WEYmQENUYugZMcRMEx09B+D6Pb1

HomjXKQpI34UeK8e91sgaO9s4e48eA4pztIWYzE6WQ
ehq7DM7CMaGadaksDMmY6gM5Qq2HTZDw5FHpFvCmFMsu4/WrJSm8+SQhtIb/FVZ4dLCHB8Kceyg8hPTU

pYsOSAdg2fpxj7vfX2P9KcRw1GqGPZp/s+ljal31fpzubkQsNMZXU1bHDVModH5bf7AkvdW6l70btxiC

cnrVBmNm7vxhbDd6h1ddScuXrwBv21qrq9Lmy2nW1U
v/YBj/6OIwN/T3kiZ7R/AO9u8Klvf8ICuEBdNjZ3fSHA1WfRKKYVRwDNeRvcGhEhFUTcn7BQ8hCd7oSC

iPHoyqvwNt36j6wHnAoUmRhHlFV69usadlN19zzAp3nMxzTNpvfENOjdch7CAAKF7kSBnWlfiN+jHFvE

mPVMXt4TovGkW3l2EkXSdRsDmobEmzSvY18PI0jFRF
+jHZRcx0vx3aVKMFkHmX1vvyLHLRvjboFioZDkvtXWtnv5Aih9Ra1UGzs4olHUrdtvN74U9+IeAkobcS

wNyV2akUyM70MTksMOuSDSQwnjRsZuZk9kq2z6RITUAyxBwhrvhxY590Qb6mfjpZSpWUt13oa2Hw9XyN

SF7OAx8SZbnH5cU7UfNUbjF28q5owe+aoVuymOfGtZ
YTIb1WipN2UowpfUtKdXwaLzq5NjDBvefy0D3wkC0jsk7ZNoE3Vyy7fx9G/Usmj76LkKs09jUo/AV6/Q

/f/j1w2rnlt0/lVmLXJnxSFyklRWWXags4xXTRzaoWMedvE8CChwGRbseMAM0Rb80f7L45B945x7/fze

5/e+j7jdW8bmo8/0ad4ic1+1r+19WLHzGSykY3YaG6
BeQQBoOXfDK5LHMdYvQ9/aHzqpjaaF4XehEIPwxWpdRbXuvObn5OMqudmd0ieUZ8FDP5QXRKdYjWLuXf

ZjTxijmC296TLDSXeXAWxPqcuaQp1fgzFoLx0N+TFkl8oSSgEQRWl+8Esr8JYcUWYtPhuGFMWcl44SdB

qgpr8YHp5fBzYQJjCtL4wP7TRxMqVcafnws73mFKak
sAM1eSev0vpFCEK9pOfi/HAY7mogG5iqq3RixGBTsgobCC/ouOlUvmW1NibwYoDphqvIpEzUQer3GFEb

D8ZESAUQyt//ist7AUMZDUdzqQzD5ZvyAcvqXDtsZzvmCY9VpqgRNhwaBiXKWdaBeFgu+i1sy9IFgbiU

Pl4cD3nSdkNZ3ra196aRAj42a3zBLuXimhIFga7af9
gF34LcJJlExAAYx90Lryc8Sn9+HOZT2Ha0tkpNk1+/TAGrSaXb2sjeWm8sc1InkvZbcnHNKZ+v9RhvzX

mcwAFxsFQl/TkwDqpqxwJCsMz2M1eisfA3zmSBUFeoxbB+kBFanhnd0j5Kt/MX6Cvz5QVSnszKZaRf4Q

7QXHUvfa+LXLAB/uwR80ktnLFwTpKL9KaGgvGiemIs
XBBSwaC4G2XydlFJfZuJjEOndcS68Tf+/nwD+EHKmIRyiUw4MC09DeZOhKDioI2d2TR0aVSHuqMOjw1s

InoG0EDLb9ou3y2vFah6yZRjr4MZTH7SyYtGtC9fgSUmS+pweuf0X+WkY+u1IsKKA3hvm15NiWaENVLs

6UZskWCOeM1Qx78nPuxsL2lQNaK2cFlEKXqWRiuF+Y
tPc36fiwVKXppMFIbelGfp37hZpfcJjOd9iJKa1LBLo+Dg1Vr1eeYxe7iaXgC4dwZW5pi4WwKJLpdem8

d8pwfuepOros724iHKoYwe0ro92yqWS1UFTlPEpgb+4+JevO8iT4ofWrDPwwCwHkMt6C0IeLsAEkSmSU

cyeXWG3GC6LObsO7JvCkymTLskP28O8Isd+ZeK2vBU
RD3Sso1pI1eqkMu8717NeYMV11EhU+C2g+JUNzZQHZ2V51G5Q2tGUpirWHUaYqbuTfzhiCtZUzTAPq9Q

C9L7fz6rtPr0RfwT4SWXiimEO5PKnbkLowY7ODu08LRNEFuUQlyfFE3YrifDiC9tQsE8LDCQhFU9Eqrv

T+bUe7oRI2PSEgxYxncqa4UYFldJ08PMoP0HvvSXOE
aHrnOtDKu0OTvhv0axSZlvAg9vi39I7iqFeR+Zkx8WSEm14yzUZ4Tn57I0L4Nr2KiDcFffkMP8mDfAQr

3ft9cHR5fiHIeSR/by1+DL7LeQ2v4nYAOHR35p8+AYoK7iEJLwHqQBKsms1MsxDEDcLcL1S0FJod4Y57

Pxxo6sRGICClsxlU53yQuOiScAq5oHmrq+vZ3ZzolA
cBTugPJJC3Tka3C/I0wXv2kGsy+Ls7q66wZzSFQlRWPGdLjq4z0amtSu472RdayXAwCJ2zFKG09/X79A

VEKbMdwg99PIx4m8Ui1Q9Iq6Z2ujkhNPUg9fv+ti5kdIG1uesUVnPzrL5lxIWwfWkj2AJT0fQqX8Q02z

miNviOEcSJrAmXZTQqqdoUikRIYjczkssvwtTlnT5i
Fp3Tk4EPZY279D383ZIBOb9tmEE6HggzseMtgLJuIgbQptXXDsG3hopiFzRLgEN1JGZu2+UjpWglZRJp

goWypWN5mCDfvcKcJ+KcltkHqjxAsZwSIBTEeBnvUkhwfQ1NzBs7j89QVM+S9PMHh4R9GZ/ESUtb3ngn

Ws2O11+j6Kre5oH1Ecj9W0pqTy8tf8e8osiWkU9oOY
/fRWlr5aw3DJdtxdFwPDsDf3pAVx6jFuXm6Cft6WzhCC5oNyFQjTKZ5oYIGGWjNI7OvgIdmaaLeDWJu8

mmKr5nMEcuTuaW6zFszgtEdOLZFt2xAyjwnzLIiGYcMjxQQjr7sLPsFKKoTzwQQtHh4+HFyNiCFKrFHQ

bHzDRBAwV5jim3eH9LEdzsKzVpABcSN07PPiLh8VTs
9yo9PQz4wF3S6OmuvJ7QmsxiNoPNY99/oRwrOmNYb3dMAklnE4e3d1xVwgUCTpQusRYTeMOPQ+tUwE8P

t+OhdhFvC1i9E3U9C2KbIpytWeReFPzduxWwB8frHWHq4bDbxxLLtPFrZ7pJo+hpTSAkmlX0OcufhByw

/YhZicXfrJuOxb+/TWgGGXnINGrpM17dJISu+f4slx
zXKgNXnFK+NtnZnaXbNF1vkN6vmKH3+6RPEpT31xH0fHCw5lL1HbgHWPS1urecKrqRntmYAPIpYrZscX

vwr9ucjtezNrubhE3VGMFz4yIgH/dh6vjDZGV69iWF3l6kTe2V5NLj657RACrtuQROBnyBZu9rs/clbS

/2okM+cm0j0KdNx/cHIhFcaF8i3ifYzSCby8gvMdcD
jvnWMxy7Ski7aSIMNnVsGTtvLCh5JFVL88DzVk55FoVejIXs0Fo3NxOtcQoua37tzKuM7WGMicyWotT1

1jENu8hbQe3gnSXusUjQCPmiafOnJEYoaVZH+tm2m6OHsc82eKb9rfbx1ez5s6UpoN7IthCeqQAKGRgj

K8ZFepr8j6YqgeQ6Ho0yxaAwSCINgZTVV1vg6sw2NS
+golPpmf5Q47rrsFjvvOITUk7HeoRdRoefeeCGUI5lyOa5a8U7HRXeL6FqbMvQHT4rVOClqs+3LgJwzH

OJrdumCtrpfIDE+M1Exaziq1ks+qKAbZwnEEN1q8G/3yFouXkgDaYQLEJNfkwOOIrmPcTVVfNqfZqR8y

15OBMw5pw3iCH2KGXMVa9oi+It6dUmCHvZGzKQHYhB
MdvPXYBGwxNklPf7Zor+MZ0n7UF4RDGAGQJvdvSpfViN1VnkJs7OIAi4/o1doDMT3t9HR3Mid26fIPId

FYrL8yrsOcpbS0O6CYT+hcNdWochzc64JtU4rdbitH6UxKn6cJfccDX/8/eDnP+E5vG+xeQS6EGCYkyA

JXd6LXQ9wKejnsTIlIKYThfHVuWQifhTfRWnhYtvER
5IUUGZRSnB529uIobQVZ14nQyzZVf8zVQrVgxiYeQz8B7GSJhThSkLKOjkr4evZByYFqs3oAKrk9326B

zfScEYEc9gW8y9Ta8tac+CbniH6LadU3izcsHzK7eGI16Sr8V7MJmZXm1BCwb67YXD/dBwmiUvgqt4ME

3YDnwSIkluwAXfB2/M5HhI2qBqK8fXAXQvtfx/KiGL
mR9C2OrOIfm5v+q7ueVLdWxZv/xQrkoW9haa7Zj4O/rOZ0o5itFNjOwPFrAUikQlyST/kbEK9vLPriu2

umJKh+h6aeXHGKUHU2Bub+8KNNF0Tbe3oDu/xfTbit16rW450fSuJn0g9vkErhUy55n/7TLiWFfOU8xa

ykc/xprOgAeesgVlO44haBxMmstNRHP6YzCm7Kdnlu
mz00AqQFUFF+YzvhPoEt4G2MEAuYtLE7FeUshHOl0AMGpiUYswNJg48JM9pcjN8lf2eXCp4LDLTKkUOu

wVDGcUwRqfoS2bBjT8px3FUYditoQ6xxCKjpoUHHVYOWM3ubuolhWolmPfFBgTt4se843M2B10j4IKKQ

cqGaci2TTA1pUaukyKL384MQJEMPsp4wLHPtHVX/7A
X6uWK7V6C3BO/in2fKtFxb1pKlppRThHHyI5PLruKdpB77NiD5QQedSIkH0yu/uPuQPOL/kArTiPSs1n

27GkyyUMyEJaPEngezzjIwSKSVtLVdLzhz2tW9KNs0GZNhI7VJwzq3bgyQVFN0L8caB/h42mcJGu/WFm

Umc1eaKvFYbNWHsss+UNYSXN9CqDD3HI3h4BblNvWt
YyiY3UXLKu2k6mWqXAXbqlWPHzz8DgNKU5c96GdCaZXO7xxFF6LMGnhB4NxJIA/ZB6eWSoikKVo3wISd

K2EQC+YqDo5ZtY6G1g3QZdV52CTO15tSvKoA8WlTNgrhJyTX7BUU97JufaBBf/uA2/eJaCJUJhRlTQsl

5FrkL4qV1Q64Ooe+om6v2lI0879zE5XAj1AvN2QTV0
iq5RawC3kUO5NuywVVixZjjbfk9WgRbKiP4M4fpZ5H0lljWBJOrrh1piau1PbMGxAhZXuK5DQHxMjV2h

ZtmABVtU1hTf6O+s/5mGiuKuZetjIsCZ+4D2at5V5BCQ8gLLbm+Q9c0jX4kiTSHOea4D3NMm0+Gc4sgE

OeE/ZglBVYI/C+XOgKEXkJUhvuKK5BcnzhJ9n7DEXy
fIJmkKIpmwkAGwgLNSzccM3/W35Jtd5Y/I8jyW14uB90IN8qtlveburEM0ukfio/LXN122xkOsGv5vLo

k9+HpW8SvdRr1J5sIs21IvA4a0jZmYEyMIiaA7rzX04yPtSraJCFne8tFw2et2ie3EMsK5q4LRQR3JE3

Wu29lyGeilDmEni9r7Nb40KdwN99qNugYWCcezzc9K
hjm6y66LzEtLswmY6BpdZ5BPOY+t40e1vQe6i0d1+RYjhSXCiNsSSMaOIiVl4xxuJ91CJacQRivZAAM9

Upmcubd6Bv3TRTBi2M+uR9+Mlp+AYX8bpeQPJAuqcGzTrqLnCFt1QynMCi5If0gsiQW11xeykP+URhR8

QiWUvng0fK7qaz9zxS6Ji5SzHh1a5uL6PlgZo3TRgw
iIsTqW5nVCPlv+3DSJLFjRhLeWgQr7KUkmQawRDJL4lyhokzU5QnShAQ00DWQNFsHX0sQ6vxZ24LnVg9

Rv5F2A1GDX11CDofZ91SgOcjPp1jZcs1GrSpU9qORcxbnAKe71vs4Cmb7q0qQIYFjORz16cu1PqG3K+a

TJzEyoeG3D3gfDtc7fApGC4Nk3FDvm7jCkmOdK+TmM
9rXt9NZPgdusPbMk++ct519elWqnfKIpzr/Jm71TGwFAs9XQirjo/NddQfwZ+6+i+34ItS52YvGZ3F3x

v/WMoyBHF/bT8e72oMombzMIAH/O7U86BSRUOhuPZ2DSIdAKaMbII6uZi4GimZxjn1bmRZaPrbkIwB3p

Z8vX2FPoXLQsoK6zuuK2+DlWg6P7mQkytJuduGqCYH
5ai51dVav236gLVYXiL24SSJJA6TAZpEo1ZTBg7VzVpDfshWgtQKX1thZRaHwEdR4TxA3ttNiw50tAmG

GlanNGl4KTSANhr7niMFJPqu96zLfAYS8WRwlz/7oa2mZV0TdO6yU8cwUdkPsnLwntsHPuTxzBL1FI34

3K9VdTmh7fJmXDHndZ0XkMW6BMsfA3qLUggD/dS0rF
Qzc+DyvSFiFKii1ZOtUNMvcLdLV7fr6zivf3mattn2NH9+BmW6Gw3L5IqUwxYzGpn7hCCNE44QR9t1r8

ztkWdnm+lhA1r9sreY1X3XzWa58eQipYNLzmvDl09Xpof5MTKVwYxhh6qTxVQa8p4Mju0gd7ebWq+eku

lmLztWW+NGv2+b4jOr1tJL5fmLSSn8z8k5SX/x/KiQ
y4LpemPa1cqt5N5T31JRLgqWlBPAhJVBIydxSNrz3CO5H178xaumvh2rGvvBecCQfqc7Dbb8+7hFDsPY

yEH9BI/YVbDglALsmg52ydQCnNww3pYv3eDtlUSSl+IHR2RSr3d1neiKcqtrMfOJY/aBMvgX386XjpOt

mOvpJ7drEghB5t4S3TFUSF4tvgdy4GjBwUcVR+LmeW
kvahDvm/UzW+gPVnHxegzuraUbbBA4gvB5aWm/cTfzKHxgzDkvN0FoZJfjF8KsQCjz/dMP0v/oia8gCL

IDD4zqUc3Ab6jKMZJRq+t6aMQw9QL1LJNkK7ym28YwN1JOI+l7aS9ElK1lAo3egqzr/KlZ517G/RSsHT

rf42M04hM0WWCJjr86jLZepsPZryTSBEy9J/p4S6Cl
fWSXzfYGt2UElHJmXsikjf4fq2CZRR70ERySF0JSITQYQmpsGaDVgDusdAQfMskJ/feyLNOxcSCrHJbh

nIMFVD0SBFE5BR5zXEmmVsjAt4tjONJlS/PlkOoi6XWLO9wx8tc+oB7KFlTABmex1EJtPhyUqLUkOsRo

eN6x0PyHXWh21Ugpi0VFj2ky6SG1GvXapC4AClGmE3
DdyYQrwZuN23RrMRU4ryKg38Epj5KlTJldQ4gI3R9LD5J1LzG3EfttY0H1Tb3XW337qP0FF30Ew+p1IZ

53kBu+qC1CFFK0WFLOVu83SZFhEWcIcrrkQtr4JS9yx8qqq59dOTkE0wy8FqjHys9Ph7KWtaxsyf6l3K

V9R78p3kquArDpq/f1W70JiSCo0IJFvUCEJ6qqXZb9
mq061BlxfjPOEdsz/bVlfDG1Z5MDLRkYXqWAw8jHSLcKRcSTyD4MbPiFdbPFLgRKvawoRNqlSswh05Ip

8EH/119XSFH+9RZx4nyf7ObfkZdSXknTdlW2k4BMtqASZQJHm3hf0h0pxwemYLK8xcD+Ng6D2QDb0O0q

BHq8ivKNWH1ZChyJoelU7Q8sA5esKkqm3ElWPrdZ3W
uOv8G75mszrvuhE9RjS8s7isf7G2fY67pA0F+hq1wIB6R7tUgoUbEbpbzPW63PaQ6uhRIf6js6KrzpVn

AkAbzMSWrmOrhEGKEfW+lZ0xMdp5ZH7Cor+uGdierQOv9hitKa7szcPQB21KwpjMmgVhg7AGPlDtjxrC

D4vVIaUkGc+ryZu936Rhh1hlzficnTjzi86RYA0Mxg
uKcgCLEU7XtppedXZW+r3+6guAn+eJh3eCa+Nx+9lv0Lx95K4oYlCvpDSfvwLpcORUIKO9dxRXDrT699

PODLcRjVn0kWblG4SMYgDNXm6h0RL0vQY1s/PV2y4McIVDOEJr7JyMI1BoNcfja3WssSEXRaSmV47kF+

BhSDhU/Qw1xsSOFD+SPI0fQjKneaRWeE7ff2DLkRdz
paoqzsKgq8e9DrVvxpenRpBmfKU+3io+/dQhArJr2zOXOGIS2LOH7pYvLsZpTAYPxu3DjvftSmlR8roG

uDTcJ1ZntYVJBN6jJTFt6ZmjzB/T+6XP4aOOQSR/6gB0lmFfNDfxYpOod+VZajglj8rj0z/njgGrDHIC

+KajBxTplhjFERoR7MErUCqP6uhGxGbjgZ+5YZqPF3
PQg2OxOjWKXbPcP84o6sDizxri/P19wsUUHDLwwfru/lTlcOGp9ytycpGGoK3i9IsSLuJR/BrdnJp/edward

wpccTg6RYmZvm2KjANn+NvHDCzpP+WeHTeFAaCMTwaEfKH2JfOne968CqhOKDC2t/o3k7mtNgmgX6Wed

1eYhQzeGtd4wRFwwRgD5BupA4+KXAGfCtID6Lm7Jo8
FVUDRwJXm0nFqYL3WisF03beR1GG4E5KEcow+p0vRkvG+Cc1K/mnliRI4bZAQ0QAyLqUQ05d6+io1Nc7

kGXNBZ/Tna2cpivBFo4eNb7XZ3F86wdFKB06E3FyUY3o1hEOaQNEUgYjr+ntBzWQ1pCZM67C8AaMthEU

Or67Ou9Qu0K4MO5K/3eG5tkEsXQjZQWjv3ctEB+vk3
kKRTx9IF1JmeDjExL/hDgwNUWoT+awDMRux/rTGAASAke89TIyQjMv0MWwkEGt6njvvD+RY9uIomhqxu

9PT5ohHJ99Od2cbZ+BRgf8ImalgSZa24hV1ePtdGQWPFIk8/Uok27O6/hGoH9wq9lilPGYh+xJLyeUK2

qZTC6bke6CGw8/V2iiRNkfkIlkFvkq2glHuTuOiYjq
TgYb5ABFjpvF6hJxgyC8gKhqejnCavur8XbPleiocwAkaad5xztRtogWF80exkeUmELie4IYaQ4JE/5g

bVKuXR4UEHWAxT5Xi+kzoov7t+mIBKIdPFG2ltArQXh1mPo4+AvcQRbcd7P3hCBL5R1U6AzWhyhE0pIb

sXTclt07L18NMaGOY5C8CXjADSxWXGlJSKvkpnN7Fr
4X5x1qRWwh+4faKLZHSNRTn+0HXtMdU1E+WOUCD/RlrRCfxOxUxSCVhklvlRUoK3rNVlUL6iV+2NC7Wf

aMzz38luUcGmGQ8ro8SDSi4XPetnXzPRm8MXOfpoW+v+RdWkYwx0pNAs9KcITvQjXgDzB8Bf/mI2aje0

fA74t0Kt/fT9vCYS8n4Xq2N8ZuAOJj7JIOPF7Jek/p
Z79v4CB8yTaCc/QCz5h0cwCsXQdqlJuwT/l+wtbn98/zsz/Px+TNICmP9e9WhtgInEWxlhZxTIy9jyt8

X1GsMPzPfrcy26bstNgnU4/oesU0la/xckAp7y0yTD/4JwwT+pRgN9HXT/G2GZfrY/cOga6oBKyaUH0q

AX2sMN4CSXtrkqMURSBew4v0PP108HhLlfB23S4jSy
xnTvRD1S2cUj+Rm4r5yTUa48m7kUpFa/n+JClMqMOLPo+t1zqWLCO+QcBC9UYNKflZqeSPuf8Q2BDgm3

iV5VxdcKje0qXMAvFCDF2pJg4Dg7JA1VXkSjpLyHh9LAUlTgthc1sIaFAVRa9cmGlTuZcpoNYP/oUuGR

L25ikE8UqiJN+HMCt7o0Moa4cv4btZ/SSUDPrFRajH
V9PO5YEsWN0wf/lOA55UWx6SNoXpIH5+SA2qi4Z8dnLupw2SzxXJq3xXQNO0tRMRB9Wp7hFN38sFpRR8

85KGfTlwfnlKYqReVLU2lvfgzfsq0Fu4N5amfkF+cv62rweZ/bCkBJhazdV4pCfbG0zzD4h172DOs4bP

ttKBR97AO3jmdBdePfIL0fgvTzMUxdHkVILQU936D7
y8yYT4bDLwyTKbff5gJUm2lKe8YGAnPKHWILsct6Ol28dE25ulmzR0hB0glve3jzpnIXEB/5PFq3nFaN

3PTx2TLLsX4HMfO3OJfGpi5/32/P89dQhobr4/aMfzKkrF66bakU/NorYd9b719Bl/4TrDludyOSyQiu

GNobEOcmhARYeFsw0rfiMDXiU+8M16AcW/UiMjwU0a
zIlW/HnzgX6Twc5sXaFDEhEcAiLlBWV6T4OcgQvs6Bwfn8if9JGJjAueKg+bp+kWD2bTZZTq9S/f4VRb

ZW0dxCBWSj8Y+YwpfY69TlOukcHAaA9wqapJJrcJCkzXb6EArIoytNDVH84nPX3ZnVx1IUGJF80R3ZM3

9xwxeFYxeYvpQXKEXOgIrGeHFO9u0OF3IA+9idOOFU
YNoegkzgLuTpbYrIVAnPSBgb3tGGF/AIp1A9thmqkd/wvcTsvkD77oSglPEUKdibqTFRjRBDSPBig1Ey

UOZ4C8dIE+3IdDAFDyy+sGDWALtVs9kOujqUVc79BXb+Lxpuu9oMnQY3u1OlyBW0mNzS84OLX0CExDo0

oB1DYRlLFfaqj3Yt0QSB34pCWyKZz+1jJ9zCR4cN9X
oR9gMf91fHqLPdvkUthJmHfQr28weg6iNWlJsFOoQ2rq8Uy/7ObTFMQjbiHQWorw0uZWWJb8/yYeBRTH

MGd0kZftRis/untbq2YUjYBkI57LocT5zqkMLOhI5kb+k2LKrHi5a/BEw3Wo5HQo6LBa4rY6gWVz590c

eTwbmKI//fCvf/kNpos4oT3WeqUKT0d9P7fhvwg6Wz
7TG02MGArjLAsSjHWQBpnRtUr0j0T2lJc7eHinMMLFqQXLd050mdAwqqOTlUFj9FCeRA+CQpRZUjBMPS

l/8bxTIJZIBZCtCJvevxtfg46dgE74SIQSLcftkVgAdvEsw9PU+SNowQNb+lJ05HoJb2v9OAwjnCHbdN

HxrK64aUJQulJmcS0nIe7mcfsJ16EGiOdCC6HfVNA0
ynOiw2KOAvYz4zaCtYn/bOt4hSZIhAZ9yjWUvJB0E6UcCb/DmI/HjIe4LcV9z9O31iccGggk9sd6j7ss

tPGaGd35yaP7+RjZRGYeP6IMk1Ca7NPslilVTY4eVCz+B4YT+OZO3WiLM/hchnQ0neaSC881T5WX2EVJ

PXn1El7dOdb+Zcgvu2NyprH83RHIo3S/ePMuXa85pf
sKrV2UlLjcXwtaz2Wzs+IrRn/OPhLGa6vVTwafkFWMKLXb5jyN8fxkJBZrFKP76lupoJ2KkRKSAJOesR

xBE1im2QjvnGT7Fy4aN+SzhPMw0M5E4yhXmwCMrJmrc0xOMadEY7FDQM2WiXjNkA4FrkFFGaZD1k5BUH

QF7yKQ6DQ1oA4cXh+aKRu2rgD/7+QnzvYFED7YSJPR
8b7sanUxkTBO8zkDfEfKcM7Ow443qbStFRtb6VCYQk/Op/QPK7NtrvAnOsXDdy+8f1Er2MBueKwdCCQ/

qFximspaOWCVBaFvJo+UHWsrk7a3MkoTZEuo5dj2WFhmFUtM5MWxtsoVKNWOkddRbzGtGUdNBc4AnFl7

tDnVfNRsCXWPWi/1X+3AgndNX3yobuo+Q+IAq6buQf
Qi001BzPlPgBe7RFsYStu3cThT2VwCa7D7TU0oSwy0JLbFkXoTkBoOZZa6umJGJ8cmzDvsUQRUKGv32M

FRdm4Hj3WmgqYkpnPUd4M3RX9xDWUa+yG/hgqy7iSOzeB0/bRMTfK1hX7uJ6uGDA+hpk5fU3OYP2p11h

B/mggmo4fRFZZeQuxMRbVgNh7EwatEuZJLkG9yiYuM
yMCDoud7UpcH0zAv8ORU+OMD2h0LiVsZPBtCKbmqJ2TslUTRgoZURdS8Edq2uX16yyK18bQXNyRL72x7

a1syE5MHCu6dlOGS7nxbhb4tZRLFucPS40CPa/9AjpwDp3hqSyULfzlX2LrtY/2lzaUGiNrLr3p0d6Dx

8/pzus+4dr+e0vNL+RUJrCfz/+LxqI/08m+DDyG7eg
dlfNXdgv2alEAYFdbeT4SeqjpWYCMz/A4aWtDb86nQZgOnsxo4e2s/PbZPcwav8UB0MCCGfO3QTDnNp3

88NBTcCp6XlBUe+MeVzo9bWPOPl/QkJlleM+xDdfx9IWx8pVLkFQDR1KNmGvdJ2B3eJkLfVk2f5MlZ9K

jklP2H31xzVSJpNlRlMuC7vLax4XLbqi5lomGzh4Kt
x/DhYytD6zFlE78+vU97s84ySfE9diD0d/iXOEje1YUa3qdXew9/bb7SYL5tqpZr12uIBQcH69vjdCfH

Ye2lePt7lG3bMngWjbDWtsL4jvAsFWzkJeYeqEClZqDYXxCi3Iu5RAeanJfTawkuHs7dQvv1W+HklwFx

tY3p/bxBZwXGh6FtWZc41t7C8HKa4AlGBRn3aIP5zo
D+v6jtXitQefDDmRJVu7K9+cZF6cCXotoGPj7kKJF9MxDEon/fFlXLoB9p8QRcNBuabtzdipXmE37tgC

K9X40e3vaUtq1alPTQQB5iKBUCVTqwFgIVQH1mEtP5btOyfQT3989Pi/j53L45f1XvV+J3KhYrkjDr/4

aMhHXCFos/SpCZ3KM17IAv1rBDzAMbn5rLFu8ez3zc
xZtYxwnyvMo5ebR33d/egmP0ryuSEN9puyURw5OjZgoDI1s+8IPczRn5I7Wf9ASn2m0YxjROtdroDCCJ

B7YQho7+Kr2DGwGdD8gQEpmXwTcqrfPZkpPiJQVwlmgtnnpBew18faHzKH70e6JBmzulONdWzpalcoMm

LsGPEkARNkONEfQmkiQc4p5GplJJnJUteaKKqUnZCd
HayGJxpRzV3CtP9EoXyLXhbShjMQDU06lvmzX421FOZxVjbk2frWd/I8g3o2DCmIYvxPIJo8nEaMALzn

v4I6idFnVPPVwe3a02BChtcxMUZg3ViNM87/RQNcOe8ruNaDgn+ma9wztb0NJMLgu6jyNI5RlBNwLXz1

duI7b6CviENHLzAHI/1wmgKsYbkkUrbuRaVSNLNaR0
u4BqHCk2pcISCGRJOF+v/gfoR/rlgdhxDisDVjP5srt7aWpbb0cG4VCPhOokrMG+MX+9Aqi/Y56RYT4J

t4arxdoEXoDDx4fhO1beqIsjCzZ0XZNu2ARJo/jr0NLI4f+WAXS+jTnC0SgOnFuemX0VeBlXbvCkAQMa

O+oD58Hj4OSyoU9ZPFYpFOzsoUQIrcbZiJo6kyivt/
a1J2iMIUqlmpF3dcrFOv2y8tHVDZHryulWj1fkz0RBIPtjc45yjkovVe6p+g8UWafQcTORD2DdiRDniW

dyWJVbcFT3OpMONtYpK69nAQuu5LBz320rzZ+iuxqKXgxsxKrhfQciDebz5AsBAU5kYUvl96p+2v6HcW

bULn2lc1xbmXVfdkRUeHLapF9qsf9Olt6OGMiCj0Lp
yntpgn767EU57NppxmRZnh0ggmZMU99u7x0ZQRx1RlKz/OwXtar6/7mE/5ferwlTfaTC53OcI+lbmaR8

GH78545wDoX1FgWBYr/sapVZQkpTUvASYKifS5uuxNU6jI4+6UXoPAxQKCOXXE61DiCFJrYmN0hyepXm

yoozIgSVNE3psjnKKNLSOcQZkIc793y0hDFFW3hbtb
T9TS7XRzNwhV/So1lTo8En5SVXvxULVPcu4LxlWRwzEspBVf1GlRZeYIgHKTmGwo2FOsDQL7wZY23htG

no/l4ImYuXdsjZpdMU5qlsFpJvxWC3I+8t/t431P8W4zRgReEPtlPY//oB1LlpZrAbPdkNhFryQCTzN2

SRxLQY6uEv+yIn8xZWdnpRO+BGrSTBPP9Il42+PJS6
ewBVmt7zSdi4P2f1Iz6YbaaEp2P9fqJl6pt+8MOB/mCZeY3c3YfZ8ViPVrQNYQFTppBXp4YiMsP+dIEj

pJkf7SASyZSK8tqT+QIO+GtDPMeBZONg2mdXBMHy805TSP6UVHusVX3X6Bnu2v+ly3o9ZQ8Pn0JRHPLU

m9SJmL/f5DIWTJo4K8nNY42SsgO8YwXIurpdiLjyM3
Rv83xcyW4ahat/Tdm4PaRyiAW0GktrLHiPMKoEyW+P1b4xzg92Ujd2RoSWR4A4D71lhvvzMZ/DAJhfcG

mLDF2yoADWDT7TBcXd5zJRo0RrxjrTPX1aoetxKJdxcpE+KgZdNb4t6GYNpgR5WBvfaQvatJisHawq6L

pzmNH1mLpjR3vWoP6Rk3tDPvNO/UC6Mio+GTIp4GMp
JmG68kDPaXZWz7YJdAH/aqSGvkbY/O4/UK7btGYpDeRDqv0sfBX/k4sEHLk61glR+mmLGMz7ZI8ec4WU

aJ4S1mpPtvlJwpMunE6Kg8DA8C+vEF1D2Rp8II8GUGu2I4wGEMzNvthfwGIBcmFxPSlf43OhF/lp1rWd

0yrWp1le8HrcXUFfRhb7UNXIfdgdroD9010Xjak241
KK4VVGcrHxmF8JlPgAJyEowtUqitq/NTeLaWeTpKDN8jTikq991VmZm4Kj+7Y6nDNoA64m1fdyabenqg

3ZxhYeylyeMOq4J3RFjLdq7dOjsFF+laxykqCRTxtHoCnuCbwFK0xOY3eh2ItuyYZOqoDeGk8mfqECub

2cUofJVcPSR61w9gueWmzof5I8/HSSc1eAkqHIle4g
F4PxTvlLQdWLKjZoLPOYumi1SYYS6MHr4hNZ4w4pXS/ZHcirffobD4HQk5gzzHCYncZciXT/fB5FErf/

hnk9uJdnkMnUP1liy4vmDQWK+HQEuN3CBOtsVaEm0Tsq4AVhZcTOg1B3ZOMbV/SiV4MSMO9SFMRTadsO

UQBZ/XsxaEw8wB/C1K3MWYlAYN40w0h2TnVaTkdwjZ
hrW2Hfhdidi83dzdiQeBKGGTpTCnOFbcMC/ytyDTAmPtE3jnI+4F8CjcGCldVwxCGZ8ecn+HBemwx0gP

7Aob5NakkhshDgM6356Q+GhR8vA60oZTMoviabLU9a3nkenGU6hTvq/E4Gu/R2kVlGwZpaPILr6d37SC

W8t2jUbSNdFf1x1zaMktY31Vf0tA3mzyKjvn0w6grI
Uo4HUfN4VMEevRsUuopSGtjZ7OzO3DggYoJyapkFZxGRaJpMOG7X3/8epitr0bcqEe79OMn/mFthOn9q

UCZCeXwhLrnaPoMG5BZqTC732eqgDZns2UDaCTc2UqOaaBmrhNuLiLBwjF3k1kg+r0wNwCanzSmm1cOC

fYx61EvY1kK03oixxubQPapZwYAOaLDXGM58Ex4cEu
SEyZ27MjAu+v4Z836ahczh98uzMRenzaQjSEklHwmOL00wpLEAxMqc9wlgohUc/4DtoILpLeSgMaaEbD

aBsOIxhB4d+sdSm6kKUvKAs6x5uuwxjUCO5Tr2nEP9DL1l82wctSDXfLufyS7jSIIodWVUkCIW0Ppq8Z

ga0YPfX4cV3pJqPnuLHVLdGaqJ7QdhWxSybjkL9Xsd
7vVwbZ3rDT75renCqTP/5TirifTpg2AiG8YaZes9XwSVmjG9AZurKMVvcyYrAe63dsubMgPvJPe6WoLk

ChZjQGu8ulBiDoJ+o/l15PP1mZsmG+hGReKWsg16ZDzWijAdnl7drj3q+p4bMufxz7x/JBvaY92hWZz5

GBc+tkvh108yHNm9bz2fbfcC0bqrfT8SIzEIxwdqgS
wTQgtfC73KE2PpxiFIdpHq3dXIs7joGggefGIRfeussdP+fMbjRaeDrCsi37sdB/+iR6TUccCs6pyMq4

3r747n1bStUU0fLPzok1TkZjTJULoq4doapFj3Tqpl6SU1U4R9HZnjnD3w5cw9Sde54UjzKc03aPOQyV

hp8q/IIJH3mj+ZIg92y4CSNcbdBs/4IyofrisnlJ/N
eLbWlHTGTs4v77oloPo8kMQU8u/DK4H3k29j3ql1rYS6jlQy4OLPycTsdg213A3G/Yd9kTxYyoRg5dL9

BSOUOwz5KpHOEffp0WFxHxvgtDn41OsWVc/n4XE33x1L6SqE7SI2xvlasD/Wcd4j3ejn3MO6I7Lo/MBh

Jx1c0WBH47YeJ/3C/jd43lUZIusQFDkSwsm/g1BJzH
2HEkgwEc8/8LA8hvc8cy0qZwbqUyJZUd5TvVQ/fa/fZVlHKlR8bWeZt+YP0asH/iUslCZNVsuyblP17c

jZ2H/9FGSpY4Be6yYfBvbcS4UMy4DfYrfPKaqTz5Nrw1onKhIc0zswBmLU2HDvq2xvx1p9rtQNtI9W1i

8Scxd0/gA9fleOuCEz7CM2fbIU6HbiSg+Z7fE/QFFn
5xFJHV1JFaM2e21qseH2BpEx55IoLlTfvLI9vL43fEPZzChYpm9r7BbnyewOg305Ldws+57hGg05rJkU

/0n9sDo2I+8QnZ60eP7imSOokq34+JdWKjtA8kp9CWR7gGCeYZpcVbyMBB7ntbhR5Y2hTXryv3+1aO8W

cK9cZMuyNn4F7u6Y+phRICY2KhEC3CAFH6199Sft4Y
tq1+cZH8ClJlZofQGvaPghQ0sa8XJBr/9Lr+i1rzNbWb+Atp+slqMj88UeULwfsJI++Wt/3vpuNzT1B+

wszTdT4CC+SRxP/u9Q8l/boBYUng0G3S9Y0sRU7HoE5pF1WtDyVx8f+zvVOgoQERg82Mox7dtZfbJzWb

/kNZ2NZN57kBLKvjhWsH2rKdAL/R2Lfl/jYfE2y+2b
20k+NrPBRja2ah7d7g6a18DSfCg2vF6rkjUxY4/3KD0t9ombo2S8rUMoNkJOjhU1mxXbGTYbInh/sNtb

dkojmq725gMrqUDpp30CMfds/SgJmoLxQQ6Ck63bQ/ch+FCQGOgmHwCtn8ecjoNhqNDMe+Fdv+ne8gbk

Wa6kEDOI91lgMArdlbxR/64R5ow2r5e4mfxVsYHQA2
fCSLhxj5d96Ro7Ygs0xs8M+U/KF+0nxQN0fFer8nr4FDg70ykeP/abWEb7ksoE4YNJPHGs3dwc3V2qU8

fR4GsPC1ovEi6dGaA9HHSoZQ38HmmO9wWPGWt79GGoKg5zPq05lmhqjzT3amQkdsT5m23wW9Ro4SqzfT

a9Waw919qh4mHEp+E8YW2BLlrOm6b9Uk4z4Q0mqhrD
NbZFQT8ZjRCZLN1a+Xw5WU2HkGNsnWrfbu8xxs8W2e8f4dGicQOcdLnM3DlJA58ko+4eEiQMt78o+izK

pj51G/cehNrpD/4mzdEwkHzxv/p3m2pnhP/L0ioGopaHFCikL5eU5BteKtQ3Pzd/vDXPnsKJ5gD28A0l

hPBEZ/GwzWV6Cvs4vyk15nJnW/vc96LFi3BgOfOeGS
+0KFIDeegekf+KewN/7Ey77oxjrdp9e3Zais4fFaow6Xk11S0Q8b4+TTEZCDANy//TBfsIJDzg6Wsha/

OFlSe7Hw7TrCHW05/XhTV3Ri2wD+ExMR/jHDL81+VIzb7neoPsLDgCy3Fd4/U3b/89CL4wT/JcRtb+D3

mjYwgMT7z6jQzMg4EiT42oPKRauIL7FWeSeuAswNNs
4JBeI54Ep1KUIG0nknXmQj+8DUxtl/S4uk4wOSQgU2/f9Qoe+5yww/M/fiV+ae/5G5gX/L3B8A+ymW7P

ZDWo7vZdJ3Z0adkjwtOitM/wKH/ecsMp0RylVr6cNUKI+sGErWn3fNWBQjrb72I3k5oinBTaWaSS819Z

GVZrW/5kb/u3QRXvEph01mhq1oJbxMV/eH1B7sUPEy
4X3vISdSs6Dc1nyS/izfoo0HyIs//oJoon0Or2dwrAp1zPBM6O0u9kVb+N+hqyIn7PZb9vwyNsl6R6xA

/lMb+CthHbKylFZfZl6Rl2lqe/FPcdmjqxNweM6MPPD62I8A9/cLt34LApTwmEquUcZGBi9M3LrVqHOv

q64fTN/3006S77eNXGNn0S/2Dw6Msudpl4ziBkd65J
HeiSXfwAjSs01v88CB9HESCM1UwKlo+aDWwReaFvz4zj/lZul3fA0BK0cn6Y/KIHYsmiXszmxzp9u39V

cwkVNA24HP2/xyYUB6tuDJg17dyhSQfTTYrXa6ML67gKwvWSsn04ay+GvekF4gTQo82uHsQCTI6RYMcI

ENgmTJSsy6uDRMAjr31jODhqO4vOmbqyBjWw3MB/v8
3ryaJAf12zRLrtDlB1eRwSmRavL6nDIKFjjOuJYUdQt/RNZXp0gWy3yVtR8vRkmg8jHB3YPwbe+OJY20

Jck8ZFpIB9BJdh6TIqPG7QERJh4JlVjEpG6mEu64r7omVmV7IDuAOp+v3ZoZsANHtf1YWe4jsihzMfMp

zuVg2E5VcWx83HB+I+OXUcvBpKjvNNCT9QC9mMVU+a
Y3epJs20/SPBKeCvGtWyzrAE2k7MslDISsZ7hX4gzkVF322svW/eecEBS/A3FqanW562ILE2jpiUWx2U

paMY7hUPIwbrIiI2Agr5fufDn8zt8nnl2KIb9Lg87fy49AY90ctc2clb8Kv6zfcMYTtBStzTt5Ck10d+

yPoNNQrcG2eZ8m8Hl6yR5U8mZ+AeFsZsltlf51fXEe
jl1znzSkvHJ86ziAToxbojeqZGMe1x8l285atW2yloz/ZiRV7DglxtvEkD75/hTybVipk/cyJ7ppJaEK

VwL+vNbruHpZAMFBfayzJ425ZvFj7dgrQbRe4+Qeu5kTNueFwqFYBl8W+nKUQmNN5zORLpCIG8LIzJ/v

Kyn+q33eBDByscy0tvgItAoyJYkp+CWt3FSeXSNmp4
QdWaxo1MHr3gOP0Z4t0mmatxQDaXdosqzM4+NNt0Nn7ZccnwnuRLk02vt8noS7dJeBNj2i5y2Oygrmv2

z8xEB+6hOX7Ok+7VOoCcvQaEBqn69EK0/H2ckCTLSs+P7pUjNlfZgsh0ftdwaargDcrwFW1ZXZwCTmve

eb5HIO+43HT5y6k1xM5GUB2dUNgXpOHRAd6YDf5S5V
qpJO4luAtjFRdEoj4rXV20s8CdZWP8nHE2/6kkESH/3ton3+W6T6+UUW8kZGBofitm9ih/rcJcXODDVS

cYFPkRKkDyCj49R8c/NGJv8AukqhNU8DPgmC97aCWWluMICY07P1Y8aMRyPSXYPPBcfp5WSLlVIAHz51

fj57k6ps/TP0JHxxhoLfjsvDlKSmPJgBRFQNNq1a1J
1WVMUcGK22OTIuHol01ITgcL+Em/ZMU+bRPbpw8EFG7q2Ed1Zaa8mDCLs3PJgaFw5PNYMoQWo/loHNlN

9MXz7lfVZf4KA1iyPKDHA4bJimCD5Eu8kCEBBRyBam9F+Pz4m+eO8tIOul9Jf6tWdOlQbOLRGX+1N+ux

moEhfuV0RkcVmLJ1cvNxhsgnhRfL6N0K2LsuwQ898W
LKbJhUBuMsdkM+GABBY/aXPDLX6ttsWxoVa4GfnfArP3MHAAm/IBEAlnO9M2gzWd1zzr8HCYAjQdjcrWZX

LP9w4wJ8PzIcyZiReQmnu8roGCjXbDxo2JJwsOO2GNRnZOtNvS41P72nmTfmc/4ZIcZm/l9Exfgn/VlS

rrJzK7HVCtHqEWE121+HWRleAlmXKfuwNLnSgrsGkF
ld+Cq8TdYaKh8+0o60+4LzzteAoB+vRQtnaiH+sRL/1u6v9F1JvegR4NHLgtRe/L8uAm77XuHY3LvqWs

/2iCGP1PdAL2+ryFhwy9QasPcn+LtKpRsZaOq2j01gvLTZ12LpZW/tBQnC3gNfJf3Odq+Tn8L81uDumv

mITYcVZV4nxY4L0ANgVpgE6UrQ0XjLixEriwqQ5EZ5
5IUdw3qx0GMRgbrnOpkwfejSM5K4MMx1F4Q9O2ag2Ad1xqrcKtuchrjIcIOfc2kwCZSCv+bATCSNuFKp

paXgKZCu/eKIiwl+ai+ZocojAfUklbIvTcSssj1OUBJix7x29RhE/Om0wiI//D8uSVavuFvmnS/24A05

FMp+alS67c8lfAgelhY2k55IYFzyZMVtEeSMe7xpIA
EJudRow9ycVr/km6ZzWxaCmDhR4aGP8Kb7ctQqZWxQs0jhlW3PSDe40Sy55ykzu3j3/wRcn/JWTMh0ab

QoT03xP6RQrOZ5QAgIVebJZgCGykTFb36N6ZGTcCu2zxHvvHJsZfqS5N9fy+LCrOP7sNPwsw2b/c/G5x

xdq601QUZWWrjEyVM4mrBfY0jhRm6LLRjuYelnzKpX
sWVHr8503RGLUFti8Y+kuIFR8J0V6u+EGE/Yvj10P3CD8RCTqtv8BCcqSNOI64GHNHZppOA+/RZmcveo

KQqHggyVCijzzr8wth1neFmbHk8rlg27PMf9s3Xr2/u8+4CtZZj0p+FjIgoiN4rnvClqp/o8InXeHfXt

G0i8t7NC3HlNvs646aLzc6emdrHdW5epFC+sqeSfTz
6glWenCY9ZN8YSRziDgkjWOy9qUCjOHbYQzxbihNNPYdgaCb9dTeaUhstrUCa+yPfkUYDE2S+ZqiELbI

U4GnrfYinepPF9JUOLBApAqPZFQ2v4hdKjtjEMof/1QHuQVi2iLcsBeCsQOis2SaTH8fEkZuDbyMDggD

RTLalMvCBmsV2/FRUpzEA2wUDgaJZ4Oot+pAG6tMM3
cEFo7mfGA9YSwi5neJQze0b+QyoJ625pkmAQFXhV/zApdR06/1DuPeV7SfCDsXnVPwj33NkGIyj2C3HL

6vwzHCaCgkCEhaldLlUaMDg0i/JzyNt6KDDP1uooBhblbEJkMzVjWhp7xchF3g9erQ/hz/yyrOgwZ68T

ty/SQvDjw7Vr5hRmusjItRAnp5Mp6BSJ0fxMsHDScW
znLkrvGXj1cwPvJ6VgKSWacpumWRp/ATbxXh52jvT8p0mBm1woydtTYslumW35fWfCLX4v90FN2DQFNe

GZ7xsPWIxpZ5RtHS9Yprj9m0rNsVDfD7qks46vY6QYaJguLuJ4Le631R7luhyG6GGPf++JH+Dwt83EVu

MyNOa4WclYP30p9ayooKK1UjaldnnuwKXwc5SSHGm5
EbmwfILtcMmHp3rseifp3Y9gbEAnkVGLCW5gzcYOVFN1bNibBAjMA9WW1Xvs8miOi5DRBSWecdihvnoX

NxATJFRJ6CUeUHkriogmqQA6xX+iHhPYLVn2yquVa8+/vGDW3WUZfGy16RaMH8jfr1efTugqu054urkF

Ws9t5017JCwIfeLeRlqqr+ALqMC7JraihNBOTNlG1k
StJODiL/uHe/8lhspyb5fz/0xk7xTSsHCHXKUl0yKGVwijGKV6dsPA6Pwip7ADDZDMPtA0PGTvzyBlhl

ySUtPxGwog4ZioXFnA/PbPBUa1n548ejHhKHkdsNJ5noqm5JfribI8l8j91oCsCRslddW7xiVTAB49w+

E3blIyu/lCtzEX9wJ+F/CB64t72nN88J8h6Us9uLv5
g07d5Jyz5jdLUxv/x6mc1oVuzGtbGA9pZMOkp+d6uGc89gg3ctcd3dedqJD4tBUIV/4hBhAFQb4cHTo5

djiO/ae5FM7NlbCA/5aWzoKALfxKIAOpLArhpnR6cP0/G6cW/f7gK7Z522yzedndKb1f3A08JH9Lg07o

boyuj7cHlFnCfN8JAeIvkahFeDyEAdqDPHMT0ECCS5
SqknUQqdYtvjjwICTR4HY7jRleOvW0j1Nfi9YFfXhF1rzICwiwoCexLT5UBmRF623ADVCLSqu8SY6vpW

kCk2hEdxQteZ1v84G+hUUlmpnu2RsVO12SYxgi82j2Tl8Y4cS35ewQTXfMwcDi1r0N13pewRnipN1FPX

UG/ZOJcFnZ9NVeeCPS065L4woXFNS0KdBuTq8c1SmT
u19YShLF0DmZVxGmimLkMofkmAGKnUCPprN4dVTsMOjFwRbdTOBwEZyBeXNWkqPyHONAgZdyCj1Q5XbC

5CnDvKqXZQMF+YsxRAmLZFM7ZtmJ4xYWIsND4g1tnADHdVosAFObQrNKpzM6D80ESzvjbKRY7ZQPegbJ

iRIPt45EKu/psfjJFWTMM77iYDJwxjXH/7IDxqP/+I
pwreChme90O5KGKms3EQOMllgLiwJmo4Ik7Bfz+30E4WiV1VzVbymI+5cnvTpzMAN3tuOh0w9zDOCeB5

TG8MwGgT7eceBNNFuHKZ8nHC1S+yjUqeqH3tP36YheaUjbIszEMJ4bT/6X7bMaRiYwuU62nyEM9r74NA

GMq2suVbA5dqSCdU0Ogw/l6cMJfepVXJ2kp9zMMbPM
kmzkryB5vC+CgmlCn3Tx4xv3PY33aAGP+vO70bJHEd3NPjPCrw8SLEU4dhPvBADTTwtVvMfsdnUNbuiT

M+vl4uY+OZ56oSR0FB1cPj9d5/zAJ/8rtvazfzjx0GFOeub/v94oPTwg2ASsOvyAhz/jp3fSqnl2q4cA

z89i4hIkQ2x1YxiP8eIQlQXJcpZ2oHmURalfLYrhfd
9cBnSotm8NXH8dycJGsejIKA5ax0VQlxEUeOGhRAt60Ium1QX5FgLLv32XlIg+UqCGqDXb7CdMxLYFSu

neXvnpu19zpY1sKYl5cBADgPBGaR/6IFrsyCO1cpjdug+kLxatkq44q4sIQJlq3CC5nf6rBQ95brGqD7

43rktiyjzkWbRWC9v1FIq1szTSzmsrlZfKVF5XeCLU
TURYjHtHypmy/NDhVKFQozSHPGKeoCfnUwbf1V9emcgepiVYFB77hp3clU9aW5mAGU4p1cnGRBct98Wb

rAfXANbDgpoJUX+saiMea9cqEnml3+npVrhkn/3NfI4WCG3jJLvwuISsdWSkJ3eMsrib37JtAx+mfod9

T/eYGYc2CSsZ2+mSoZL1SXi+1P9L0mxfu/7Lcg3Kyk
dhxG1OyJArLXvPtuVvcLOlngWZB8/kVVqV4HB5jmkGVM/yQjkYmpKoQXHnKdHNa7cP+33qJBU6WCwkPB

WBEfBNxzlqCli4+t2IGSrA2w/2O5/0rdM3PeCyA/dazh4ZWm4Xgm3VcMQBFIZ/kaVH7VaM+mnM68YpBo

UPfqTPtQJv6IOr/jzL1MUgtXW+RC3PPxSArZGzjxpu
FFVBKy4jWwx1Nh1HwujZIN3UeRabggSCnA0rfq5no0d8V4Q/MUYgz3yaqXbhfixWmz+Uah2HyCqYRRns

G1fbxMQQVSW2XMcCAbI8WFfiqfbFOdQPJJ6uaPfpOkhDMkR2gPl49GN07mFedqvikcQ5ijoqrPI3orpW

n+8zXpozfMEmqz7X58Vgf2M6OryQRKMA7nNVMCiQ2S
Dzn2aH3v3Vl7Hqgc8K41uhe6/EGQQQvcHRdXrB7L6Nq86fe10j53XaFm1S7MeWHLopxVxLvNqj1EYxJo

0/nynXA/DEnZL3jXWVoc1z7CCP4O5KZhuGNgnRDeQstHz8XmfjzGxmZS1VwtpqW6bYhv8vhusk43TXvD

b4lFiZGXwKvYUoAJLTkwM5p7gPMQCzdieY+/mWo1tY
g2FUN0gLBRL+6XSaWMVQF6/fKSrY4uL0NfxbKFq9PWnQuwgRT2D9yqDo76aezcgC9i5VukZSv1p8hzg8

jEsVRyOGL9ViIFDjYb8YFwN2AFQisvYm+SUoQm2RBY/yUK2ywJFxYeJQTzphn/RkbsdGMYX6eSPooq4N

FhvZeXoDnBi+eE6MJYtJNIvnzqt8/9ETKTAQy4zds2
muDkaMwOaNRE/4yfbujassi11oKoA3i/hxNN0Za+xlADZGonYrAwkTXQJZK86uZP4LO2myg+/nG+Adl9

E/RH166P3ybqbuC9YLkL9439PkjDZxugOMIPg2OxlzTnENModLjPHn4NYYOLUrKG4DKad0bX8rYdN2iF

I64MiWI2Jnx3CFoQathSBm9BLZXH9DYbvynfsA48i5
SbSyxWT25Gz0etc78dpeFgI3Xtnwmw0DCa8Pb1fxHEjTyLcgYAg2YMwcEXYgP10yaazi5XBYfNyC/xAm

w9tyPQNJBDM1PAZaRqOQCSLqtPa+G/rQjAgmqwCHVMkCuRYtJr1s8yvVN0nc4k5UZln8mC7SOr8lyBy9

kMQjsH21/dw+GAWqTk4kS3EBIGeEmIbG7jqVPM
XURkwmvklKQmkx8UNkTcidMiqzMRpav8pWJvZ64O+YBC6abiLK9saZk2jZe2+W4p+15/ZPh5wJbdyaiU

z/BUWVKJCSVewaQBm/wM7uaWvWbmZSZaHyH+Hy9RQV90RYcKwRHZJqTdzv3HNuzJN1t8HHtYZhu2hX6F

zSHx+ydRHSg0PnzLlSxoQHbzhiIvGq4WGac2RTi3EC
le0Y6VPMTwh6LZMAp7Ao5r9kqD4B4BCWgphX/S1KOQoz/Gic8keN0FTqGyu+r80UU+TCuw35XSttybEL

bycIMnfXCneRg9NNpdn8D41Qu51Xu7OxQXjNGIrXimmgxFWf9/bQ7f8qwPbuh/HUnVwD9RLt1aFlr567

nMRgEWZx0BIt5PIDfgPlzc0W9TXT2JfSfGm13W6mWe
UFkO0YMSxtMGdZrQXcczwh/4I7yvCwmqd2v/FcUTKn7a1owoEW8S3xpX8JdESRxVTXfrOmbBzRSSJhvq

uBUooWbYuWQtt/7jplvb2w5ztvdv187d4skpa/JVfGGLnH/TzP/DZSOfUyEqouxy3W0pLjg0I4CbfU+q

wfXS17awuU+HjKakTFu/4uFkOvi4Q9L/8Idz/+/j8X
5u56DzES3lkL8J3YQ1K3PYTUcBgwp0G9nbgp90zz8Vw+Gei10W5C+FrTyXyiZqDtrlj3ZlvHov+Fb1re

PccoxB0bilU2bkTd8GlKqwkkOwjNKmGTnQABdFyowLp0zZfXWM286i+hNboTxBkN9WyVr5VNDJYzoON/

0WsDlucQwH748UmVhuMJbV1qgb3PBkqw2DCGVeYLKx
P00ZYDylmb/VFHqgAW0ylZl2eAN/v4iJu1TvrGoOwMm2MprLHMokCE+4iD4L0LLfOjdDcMv3NHIxdNQ0

533eJ0lMpi6fEXk6bavHcjTCTLzMulN7FnrS08DURKXssp03ZdCjUIP9jUCIxCVv8LC5Qap7Wk0sbhaN

lMZMYoDv5d83aVMoIdYRAr6EoRmCU/ipaMzfXD275i
b3FHnKldJ9HXFXDgA5MJrnngCfIxheGsX6pO/wI1SpfPzVCaTJkoG/Nw6OLqJpyEUy0+/oC4lE5/lNeX

9KfYZ8e4cWPuHLe5RUBfAg47zaOvogChd006WUG5iGExF7hcUnFaha2dYVoONMBkL6iqmhXNT3KcM0gI

2OF7WRQvqTNUL8eOyMfpwhUjiMrixMyhtFKL9K0inx
4SsQpG37oRKnlYirzd7ZxoH6QHuCNoNyKGnf/kU55sGfzhyOIktG3RDF+zcVW8U6U+kzsVaVJdvuU/Cf

KUD0vfy/t2jdOkoD/167RSrNGhSbjr612gRxiAOsxVeZK3s2NGsCQcAZ9ZCDr/FeiSdz2Jm5/YL6EIyf

b+glNeF+iSmfpgEfk6XT/SoZA6fw2A/pWtSR5wEKJ/
4jJ4E4aN/a2nc4350i/mHSF9dSHSwTZSawN7ufTFcVs8abLhx5QjhWV4G/ZC4E06/fMSNGVP5GVg7BMv

UgrD4aGorDROUiGTMBJioV7uvtdSklwdm+y11W4kd+629y6eZ0KS4tEzu2z4UlZJyCialv5yyO4I+M5e

8e/DBJqvGZqGFMjjJ/SCdnOP/ZhZPwbasXy88poAU5
hjSDfzqj9vE5cmfZLQLhm0E0cZPcZ/8ymb7KUfAj3imoAwLGVn2MDJtrTSY8LcO9p8CEHxE+pTTYReM/

MdzT5MvpNq/onbEY4VMBdZNlQzHFJOb7QuWiY0Ao3FMb47FtdBvz6tCxI0C4XuX5MWny0KAm4SrsWgfa

Xc2g6sFgZBlahxrbuw2LnHn6rJc9KZQYJfmgVORwk0
Eb9VyGK7LKqU0Q6vEwgzxQ9Tn1k/APZrElSWrqqWztwZA/hCRZL980rdgjXhWkAdsgjZaiFJ7P6KibqP

i1GC/RVgrhs2g3Q5MauKTIhP+Ps5TPI45cfL2eJ3RWf8obzyJQT2WLtu42D4zn1tzLhv7iQTGWLxUy/U

85K5c66H30V9YMFGyp1TXpEefpxeM3bg5d2nI4J9mY
EVwCTPSoi5dwf2kcjAoJ0dK1w6tUuC1MKCMY5yT9OauL/6bKMqn9VoahkFluaLNRY5Mipc08FLabNL2+

klarKEsJXZSoKvZ/NuiCAvZlCShBM1dXUjn7yzvK1JkebNjUWr5qCx9EdyhkQE6EcLWicuFmtKyD9FJH

VX8WWjxfrAr/+e+dqV+moGcMocxE3ih755IGuP4J1u
1b7YHWA6IlBSkp3PEYKBe+WOzhHTRnjZAGkuz2YvTgXA2nzkFog1usN32ZobUxIymPifjXIZlmpDbaAO

AVeXr5yae1d59em9cmcMkzUBR0pHFDasphK3N17Eu5XuataOY1uhGnamKthqzuI3+FIQl+LVztmK+NzR

YOjE3rFbylzokLO+KZp+97GjHV/HSid23Ym66WWtay
5QYf52t2vGuQLBLjy6b25bngsPAD8hUxa/P0mVZJCzcrtGiYCVPzbs5UyqxbX4nbeNVpgh6bekwXWn6T

eJ7OBO7eyTDC8I5msxLllXfvTefL38/QKyOZk0DTOMaMJsorEcrC8rOnQ0lve/N7+Dapl1bPAWBEgSqN

iEVug1VaFmNvPtyVaYk7vWb8rHl0bgvtQYyD4mQfHW
Estc7u3vu5nLYd7Mjzpc8QmOMv27FcNiFGietFYMz9FYw1nV1uwY6kBi7RTL1fXxcOws9+7pUDyxVAMW

cl9FlFNvtU3BaMb4XyBZbeeU59zghud8QINx0TFcwKr6ZDBmeerq6CKqBO1YciqJpNmyW2XlUnLnZqyp

khmegMvfCTApVUX9CO1N4k10wHTUgwiZl281r019uk
uMtueYSU+m8CoUlctqTtlrUmVUp31ck4ZLcGYMtNAH+MvyxmB7aolyiVAVPkFvE6Wpe9kZBAUo7gWbAw

MqU5cf/7+PQln5p5dRgB/soP6sCeAjhlCQ18Y4Y9sgUi4TUpDMQN0VuSXHJ+RnR51SRCofdSKqE60HGt

BdeWWFtN3TrPoQPSlvCZnta+kwSgDhpIgTTeopOSvn
929TOJZi0vEs4UN2XpwJKsZ63pLN39yn6oKBlRJHbFri84sCR4EC7AVMGM1gyNb8Ss0334IZdYFMhEON

uWi0qotg9j7ysLYIssRh+Rcxhg+N5KmBB4QhOrEy2x5J60qnFLX2t+BgenS4HKRAyoDx63mfmMg+HHzA

QPQuOCMZyh1sT85TiYnGYDCR5PiIFtBBCr+nysyjVI
Nd6E6libM7sK5ADidTRlAusbjJwVkEIISCbp9mhRma/LM5n2KHGr1TECj6ugzJeyk3i8c5lMsxKWMeRB

0K52wxh693+FWKSlXvvlGGux9xMXEefFkDYDHXS+8OJZU75gHswXD0zFae3VdNZoy7551+PnOYKiy65O

PtBPIemRc7oMYgba/+uhsRLVKW3SYCmzsHDUF6aeNq
ISnpAUtM7d7eJfqWbdrt5GaarjCndofTXTNXP/F1et1Yit0B33wzcAa1es/TD4bIfSc3Ohndm6ob7uSl

aY4YJaC4SJSq8hfMO0n2pou+5kopnpoY8dXNofNfjr5wwqgc4as5Bww1wn1HnmMZssxYsvzDkXker3BW

AT7wvdX7lXI36/M02mdwWtnghF1Fg+xCezVDmjOsiS
sHxk8hyELtQs6AEq+LZA9LD8uhtEKdcUH2RxLdfA+88UW2pG5Fh8zAUmhwD4nCoTpGgqfBW3/AsAGBH7

7sGOR0/GtKy+WPpwr/SGmx+r2ByX54bs5bOhJ3+wswDcsl+jgwjPPfB1qCumkLErXo8v71k5//ykKOMC

pUYLOessFbfUgXce6AiR+65bly2RtI7iQO0Tbu9Sgg
WfNtOQSaa3ZfNH0xsP7xadDQZsoMxNrkfNOmBXgALNkZa9kPlAhGoaQEdptP+GN9081IFlOgF+bSmSLA

/P2R82vEq4rveb3jG4GiSZS9iRAuhrPDVymN46v5OoFrYFH5M+9VFuCA1xUWLCZoFhTLxBxd9r7FlEZA

G+8HKWXe+KHtlDXhL2yM/C5SbFVMHOiezCeL3cVX0X
sNSZ94DoznjrBEl5rHRwqSacBsfhGJNZOkH2onytRitCnyLfVTIpUUpv226LOQM6tYpeH67NgMLxqr97

hf1ZjeKzUCJ8HFbVoaqRj0EWtUOSBPwzdKz2BGUBgldhEMhGUt3rnElc7ANY8hXNziPWGbCVJ43kjasE

HDfgBRNmBCvhaJIOCdXm4L/ECufABlHNELUvEJXEO+
guU2rpdcYdRjy0+xg5LX8WhBpzcuDcz4BiwZ4q+VjUCJckV7W5ThE4HIY63/iCc8zpHg14iC8BgBsKFe

RxcfyZ0Ode62zQL8nNGIlUWJM61f/cza9FgtMUgWU3vFriBX5oQF5QM2IhFpZkU6/nciVTZ3f2kfi7TM

k0XBAmPWDZyr28h0QyQ03WvQxOOvngpvIQpHe9KdzX
DNcDiHI1FGhrB9ykRfGWRMztrutgKgIZnCDaCfc6GzLktkXY2gOFyJ7UTDbk0JFbdv+TXorDGCa3Q6sB

zBzK08EpQlL13iWUTPdFZ3WM3tvdgtue2rO4W+cGXiEg6jC6XiitCZwFnK3X5HayIM/1ZsAmERkqx8E7

a8tTRon03rJ+2OxCyd4dOsxR1OihiqaTQM44LaOdWb
M5sZwBMekbpq7HXogmSvI7iA2oDXY0hB5aqpk/UtflKgmjzBnbCPJcTx/VYYXrDHGz1iOif2ZPf1WsRt

hhOpE9KqnF5ARtZLTT+XbW6st5up735W0r6GocwC25pZO61/bK6Gu189yBdpxnXeQJ67jjMcV5KSdZDO

7DiTWtDX++lxuy9V5pRsER1pkVYOjVZ9gHvMh9dzrD
zfgYsti5jn6bkKRCyWsq4PLIX4KYFrHkKi66pLRIFNYwVXdAcWzdVs4tmTYnuwAwImEU4EylmrDbAj+r

NzvgjxDKE8jSD7k3CT+hQBsybV1TbtYjUrsnf24ZQK2Exg5bGlsRTLPRiVcOpR4GwY6gBSL6+bG0BWaR

GyNiAWvUaH+Ef02LwPmzvbQoYVMIOkiYs60I69ObiH
jHr7EF+c391q4z4pQwwmRUSh76+hYL4JyszNPOVlWlPJ71nVQ3Z+jytBNHHUm19WNqel8nk1iLGwdVgw

tQ3+OrKofVK453GSgS2DtpLpIjFdTMwuScp0YmlsEqGZB2wLP2dBxsCawXS20WmyJi2YcrCJ0/PgOIzo

pOnSsJx6JCryYVllq3rUOwck4jDcTXORaEO9kc9T/r
LEgcTHAAJUDOsUJnIKza5+a4+VglpVr0lNluZa3pyvn41Y87ioHECFbVMsj78fwgSaswyvDefCE5TkBq

pwy/YG3SX3mVuu1fB72ihHEoc6qudXsyBxi4ulR/R4rhc/qqj3r5PjLXEh3z0AegzysNGBDnGAii/Bcm

X8MSml0YMkHAsC/2RBWiM+/CklQY+x4bCyutA+Aa7v
1f/5F6dsuvTFZ0c8G6K5Yn5CEYqVM5FJcbOv95ooX+8sYHEuRUcb2WG7VRIvki4/kNQnPZI6du1zqiIV

y/L4OcWJPXeglJk+Awp6rYYQLJTJzaFSbVWuaYynG7xxDbS+oZwNx6aCdIISkbuJbHsFFZyp2cXZcUFh

dIhxEIvY3pBD2WKSWxqutTt6uGDL/zy3FS/WJJ5kaV
kfuXDTu3djBW0liUgBy+St9WJZJDUSEGQt1fQiwftBGd548QxiTX7QfDjIQNGrftRcmHWPUGc/uS5Vox

px0JN8LodQP/vkjLC2oRQXrHxO5YN3wdk31ezjAwCYdfxKSb81hsDMKDPS9/ndVc0C+biiKtYSrs6xfP

og0dCdSMPp3WovJqtNnUDfJcVy6vMSorftrCIRF0Ch
n9x+S4nEleBQgiD8+9cPdo8lojTIgSr0O2dO0ezrY2JjkWXPi3VeEyguBKTqsn9SuHaxWGmBMiodYd/O

iuD9xtXoqFfKzLTM/Naw/AlZFQhjj+ARIx3ZhAf9p9TmEuvXOyER3YipbPUKWWpvlfhJChbDhaNYYQwc

44Ps8XW8JhrMnHFj7z5Bp3FAoZaRfdAvZE1PEExWwx
LN2pFBxERvMMp4APxHggMGKesTO/TPh9E4lSS01t/r+9/7NIjxzBeBietaNEC4cgoPMe6fhSCVKAAIKB

q5dCAmD8YWylipZbLlE/6+lfx/QFB/+1coL/H/SDXh0hSGggpzgwec4Sg0sfEqd/IJioSAlnz0PWL9sy

joriX2Uj5P2owbwmQVklewK6OgHkr7Ka2lQ/V6sCpT
Fk/HY81O7J9a7xXXNkHmTIrl85cLJygDdnYpr0ly/Cw1qonpNqOARW9gkTwteh8vTjgpqRV8HSDgS4li

8yXocjrXIU6K7kxm+C39vhXBe5dhzAFBJjbyCw8EEF373R3sgnKu5zeVJ/IXERzjsmGETRjkbf3HKyYV

mhv5iR6NVotwY8NkHL1JuTwd1ai0AU40wdzdOJ9Eyb
Ync+4zZr9TM/UsFy1lDbwLAHqbiFylPsYxtvSyxY+OLn+SZjpmoC4P6UTfstJzf0wKVf1pMJI6/8b1Op

XXXTQGgNVee5g0PsgyNBl0zRSwLkRESG/OJrZrx90Dhlug71T7GdEUiS8tCUhEZLpDKS7RZ8mk6O2WjI

LqV4cNzTem4Lc+Xm4pz8Ak2VV/8bsV83ZCUxEZIiCX
0szq86jf2428ZEgRWZfBnDWxZRpiMgMnZDSr4lZ82AJsAC7pbPnv1+M9b8YZrVKCIOl2U8CNboudZoe6

LItrBLww/TG6a5Qss1g+sKE6GV/X5KypNNSc7yHpwEXs3YppIDvNAOAGiDc+drWo1kgg3haBd5tNmNV+

saLW1qRcRhGuhxnKNpMqE2izwSvkqOqaV4TPzxo973
KNTi/yjd+7NKfE2h8OnMEAoQRXWJcW0QV7MSZlki8oS86X059lSwBm6hq4ziYURSR7lt/kkRL29anHow

jV5wy3v7Sq/yBgH3//Ft73+AZKshKblkamkb3jhp2/M5u6YgV4PR5tFqRQsOmXHeckesRf7IdFnuI1zo

D/W/CijhVzi4Ly07Oz4qD5P2f6zzh7RsMu1Cfxdba7
MqFEY0HxBFazw8cg1WecNQol6y0PA6WeFToyQAFuybzrlmRL+MowwE9w/CRvFa8WDDhp5ImgdjiSjRek

Xtdbvc/lnbCsHoWIumkKtebMcLGBPcWDgxraJN5dLPti1ceri+nM590HrexJIhPp9QewxSmgc5gIuwM+

HchnSiWV72XBqML0JqXVW6jvuzwagO52Sj5bC3Xixr
MpdhDSKTuApYI3TeXdClJTMVopziZQo/YsQXSxIqvOgGKHhEswnHZEcl+FiTw08SBp9ZMEY99jSgnMX4

AAmmNnP3CNKvJV+5LZgpdSL2wMip2PkTQ0WfDn3ufAMww5FgZaipQXKL7XpIqzmS+d4FLJacVk9yGro8

cWud4lVeBEM3N6obghUcduWdkHGqla8UNnXzzopNNb
aZeAAQRHkOkx7Wc+moSctikXxgEjDxxJ5MKFieRganj52l7oIVhV/g5m+xbUidLE+hYFPOAN0cFSaIHq

d8LAFGn7jfcOvCC4lIy2NAnpdt51WJ3Y0pRUky46HM0amlpilk+dIjg1EpjYapaYt5gcdff09pFt6HsX

O+PPn3lDSTXqMEtCQNmP8AN0rIRXib5IKO4w2l9lFz
kp2cBFWBD0/bxp+tCqe+vqvaS7x6bcxeVLVABjTWl3VFbWWp+CbPXTm+JZ4l0Wd9j1Kpd7zjeh+r88x4

x/8xx59v/0HCtH/gq49D3/G+pt6pOAZBmU7jnI8knubkDUzmB4YDC+FpqtVfwDGBF2QeQHFHvsc172j4

4WgpEVsWgoPl6lf1J94xLAL3CMn5iK9SDaMN91wxnD
7nlw34Ww8Nl4GNwLCFBaWhnA2+Av7h4fU7K4wt3ygGAbFxPzGHdSoLt5tYz5SEyi4r4891IXvPBRGNvF

HWHteOXw+qv03iSoSGDvFC/PdmBWo6yXVI2Vl4l+By6iFmvqsvKYDcO3zUoFcOrq1rh823p0z9gdT2qx

qDuoh7YsxV9XIJKR0HwvU7ELYuWRelgniB1rgsfsTD
CsdOuyYJw0c6m/36XWHf/TaBVKC1/QPrjEeKnt1lSw52w9Ho7BmQ+NWdIeagHUQhcmEDt7hEUG0DfVxz

lEGio6aOTjxDLEDfg8KqVfeWwBSV4y0kQI1mMltGlz/PkUfLvzGpRlri/3GqdRwXW9mka6YW6u/joa3k

e4VaURAccfZlvQsaoFrJE1AlKOVi1imImHU1komzCh
ob7xkr22Ruva2C1A054Shj1xN8V2sUHyhf66l72s0uk8ehmmI+pQ8oqWBj0hFJ5MP4RRp4uUqEkN14ep

8sOnruFF7ZDmuvC7PD2aBlbZOnGsjbEj4nCPomJlOPBdXaHQqNiW7NiwU4blluphWjn9NHtxgxhmLDuQ

Ie5a6LQk4XiJ1/Tb1H4ycQtsKDLwuWuFBHZGPp6r+g
6+SVorVSrdLqLHf6AkxYEhq+8nwcx8yAbE2vZ2POZF0AOusJcSPJ7SJLIm5g/MUxlV5pfdm/p+V2Q5TB

cNGzCfjMNbsigog7WqI/yLvrwgdPfiCP1zWAmkVya/Ubf/bMlm4MKqY5Ul4Mx/OuHEmiSQm0an/82b/K

CTYij86DSsn4NfmxNZKhTpgxmjL8ozrS33ybvaqyJ9
+ED6h9WO42+myh/xweGBi0xN+9fxYeCLV59bNtZ0BxsqKU86+8GOA9qsAvUi6N3kxOUvRTSqAVt2vebr

+Epm0cdmyXXHRTRV9eq5k3x+sPth3Ifu8RzQClyzpEq7acMSXVOgfAl0iY7FJU7x+RtGBZqJDRUX5smw

uPTYLJaBeAaeTkeUktiFrIDLgvFTgU6bgCAkED9J+H
UhQO/DkhIL6kJsDocOBOg7EMiycI1n/pY5+5CxAAkw18V6rEcxd4Jg1ags43OwmyrtG/RNku+bIUrE2g

1DMrOA5x3L0UFEsYK/SvAtfdhTqhqUYrlUIEo3T26mVJb/rq6HK5TqofIIO08y2i4371w+JJmDR+Dnsr

1Wy3cRk49cmHbUFcONG956QW15bY1GDmbENDOQ8O1J
t5yjgGfyJL7t3VxscB5mwLnvI0nCzwqKO+MD7CuPxo1qLQwZ3AOzDjK5COrIvbum3I/Gs3eMJYmW1o8u

Tcj61MTS9S7Z4lydLbLga3+iPOvOz8MdduH7KNHzxAQaAIGMnfNOxcOpY5YWGk2VylXMB8+OnvFTLml4

HouwWIo5ujwzd0y++Lvie/m9Mjb13mSf2J/zzOka+T
2oNuy/jzH+r2cCofIyfYo8zspyyOvz+eImouWnbaPBsDE/wddQR3Duyh5mjnzTtdpG4QgozkO3cESmt3

hiPZXY1Z5s8dpqmeD3wGMw+IHLbX26dvUuD/2uekhK12gL/4RQ/i5Dst8UnZmBZu+Y5kSITmHz84ly4P

nzUMlrpHwRpwzbBlEGxo4+hX5/BFecJGjR/iswbuI2
8kT7PZ67j9t8p0c7GARzcuAPYfMVt74QqZO4Bzawbizr3Hj3K8tpBvgaWCiZ5V3IFpKIzJHZmtKHM4en

6f17b9oFpImJuNTjqado4WEAeQ8+7PjhgqBMv3/1MpFpQdr68K14KFTRMaIcw3c9QzepYVPn8wgLkyz8

//qOV0OT71u0x0hsypJHlsXYRLJTzzkX1YbtJ1jMTF
ChYR724aKGbFmriJ0Fs6JWlRJDv0ulbvib6gqb6E2g1QMJMx0gqmNtjR5ni/BDa8SS1ONF44vo/eCOh1

wTVOtFbWqa7mja5+GiMrEbSOx2EBEoCZHisP5WJYhQbrXq4/M37KXQfeK9LGuCOOluP/J9CALUzcQRQs

r2Kk3mHdSwXHECyg1XbwbKehIM7Z2sKW5gs6kk3eGr
BzSaqGlFP9senJB9GEmE0fT2v9t4D0dIXkJKXMk0myvOLviKanbZpferNs2YbpR4E33Le7iSuoKqZwFV

EFUZ6oWQlhflpP+yNRz7d4rrfMi9SLfJjXuZ4t5Krp0bxgpBatOLXKD0pI6/Y5KKpjBlgg4VHkBX9dio

CH6Qafz/CjJO8s7hFkOSHM2OCtqUUrRsG2/ClqDUQ/
CWrki3MCeDgPc7usXtbgbE5or2gS9PbY1IJj2qF/2XKMy6NpSm9Z935sKFOYJ+G/V2lvU+YwSfoXR8S2

2flwgcFXA6gXYA5yQ9F6ujFEvQKgST45XIVCqu9u5vhgJynYo18IsD4tOQQLSnMQ8kC9xwYw4m+/GYMJ

xkKYCEWEhUfq7CQAcJuwkS0buBefhtt0J7mErY4f6d
9mrvAz+wRcsAjnWAscQvALw+tgx4v1xywv6G1/o8HV8SXON7QGTFG9YLEPNSS1HdhEzCR1erPOwEefvk

CZOw6FrkXGYvV6l1ufOvaoR7i30pnMVmA7xHC3QvBcCxAFfMsbJda1/QxxfHe5bt1UpUffv7RRi3H28I

upQZ2pFyRXiEq71zs+t6oNhY7ooKIkWOqGdHqhTqmC
GAPl1ayoCoWIusBE3G3jabtJSW+TYYTpZ5LR1NbJABVvLk+uHf0A7jV2nAb4Ymf1EmC5um0XK1X/AT2a

gBp0jrWJ44IJieJL//6TgW5BXwiYh3L3i+BV7zcUy7kEVO6u2lDeesnwprf0REUhcwAaPdJx6FLyl+Xr

h7bpg9S5dXVlSeQlp0q6iJQaCfqnE28vNOLxtHLhZX
rAvCnWffrp4GacjvBmc7EzlvlC/rKrke44RZ7nRxliqV/5EFmBx2Uvvgia0YpaJtldP/seuePfhbHyKn

ywOi0xVq6VegR+HGIfYVu7fblzqErsbhpzRFmg1aLov7b7JVXR/aD00w7+xx74x0ETDaWgp1RiadPmi+

tRe2bqs36tirE+HYvLgTpxpi1+HFstBOzUeFVYuH8F
F+0jFf5ZtrHCSp3DaIysqQrhdeXy8WAOOrhPunII6FJ64Nnniu4ks1XRbhj71pdBwVm4ZclyLDMYSI5G

KGDkLGrxnRyyOOCi6MZJO+rpH2VdLwhnHvZT/Fve4z9CJRxQpivQ9pfLr6gVd2Up8zTqcCALgTNsSkaN

5RFPbYZVGF4m7IUnVf6IJjHRT4SfGXumzZdhzp0PAn
IPbDuNqDEdjPMOqt8b6WWKqkXsfJGyBjPGMJNWIHyPObThUXbgKmZ3+J/5QLRWysLBdG3TiMRnne2yip

tBpBdnJQ7sUPL+mblX0NsRgvwoj2t8yBb2a0zblFBr0vKl8EHzHDfqRfCqgjp427RXz0Y0iteGRxqkog

jzug8sU3DIse76ZgMaE3ZYVR2xSCVUNRG+D3hhzVGz
qK5Sh76b/eqCz2T1yJjWLiAj56q0h3M8S3j3/QA1qRZvMew59uRWHmB+33ZX70AViW2bxuXKYypvGddg

dKi07I2vg08W31GMwRYbt9BZu5gzZ8iHoyPEJurcnoU/t+oapoKuQW7guOo+TKSJqwKl3oFViK41OeKs

nIyKKrthRImNZMuSEkUKWXeWkj3vTfUkXUf/DI0txo
bjy9/2Desa16g5L2LKfQx8NJC4VdogKWUBWJiSmJ/NjV8zvf+KFF10ZsUuNv7OsKZ7kq1W3JXyaC+FHk

JBnFdodnOJYu337u714eJyZiQSCegLfc06hmhkUl26B/9MJa3xD/sM2v/5QiZ8JwCw+r1XD1s/hiz8Tf

Set6PtW+qxcAXw6HSXkV6wVxll0so1c1xm/Lgy9IhF
r8X7wImgyfyJUtvLaoXvB9mUgM8U/YJJJK3fOYwWk3kSLSbp60S4VKwerBrX13aXIRf9yT1mAVr9oDxg

Fl4hCWxmW8kgqXFDP36FYr0RhyMte1h23HhbEIPwRC5kkoQ/P4zk7CNxugKXhg9bqmn5azQZfZmamW50

HtvLBnzbFnixSNRKkmVW8F3ufaIfKRqOS0oqeaD61I
TmG9pjAJn3Iqx00QRvMl4Ie9cF69T3WZWhLaZG/OluXUOEolORt6cVNQX693fsd0nhcUMgcOjJM37UyV

lTZqfslAgTQWv7jYfgyi8AujFI3IYiNTRbD0YqXSbEgl9HTe33iQRedURUipfsAqO3eCtLnanIpFmJqm

eyrzclKTKnQf1im1zdcoJolt5rW2YUkNjfZOYCmXB/
Qr2v0Zz3ncfAEcAnhgg+umOZIA9fwF4vcBqGNRqOx2ODX4DSfwhVwsMDFLcbgnMk8zVqfbjkRQ7mFHht

yO7lBmnKh5moQyEQ7p8JvXs3Wy5ZOClYJAUunpnjUu6ravJXMGMEST+e0xqQBlD/7vzLnbvxCzLApCGI

MqmwNEV9EPy7XkqQfC0ni2j9LUsGNKadN9S7XEPImw
z+6Ricji7qpxIOyZNyNt5hpqtFYLLoLySI//I0kXwcQBB8qvAlxV1AzKJyf5pZLlc595f5hzBXnMq2wF

tQ9bVoCYPutTwpqFjcgOdP3Ftjj2FfVbT9BZNmhh2vtNO/bz5MoWrRWGGDX79AKYrzoIUC/1EnW1ghsA

YOg6g45i0dRzsfFUoCVAB7X2cjT69fZS7lYcumqIX8
y7CzruFhyHS30b9MjshNuzRrz3NsL4lsbFErH7umn5VJQ1PBAMyoJo2hTqTGdZJZvg4nTudP2LD2Lz9f

wZGNKB3QiMeXojSyeOgNKFzMJTjVVdo0RfguZwte+go0QSUVUvFT6Sme2+1J3jZkPDEWeflQmPvCT7RH

wPgBDE3DVWabMjkAfyf0jXIbYkQxeaS/dKm6zdE6qR
21Yr9/A8QpM69f1/76vMQXhgbUamZ/2Gny6ITqFltE9pJQafG+/bWFhMD+EXT4jsps0YEjmITvG7hnxy

AamtgF5w412/UqY+iM0DmC8tJ5JxOBTOYCmiD5wBtR9D6OTDEQZgjGCU9VvyZPgW4OGftKAESccjLQvC

hubKCS4IGKSkolU7YAIQz+RJk0mtS1i4wRRCU174rW
5TP34uDKfszjkZygyshzrO5+53jTBcPB8QaxXF84DoiU74tYv65DPiZMoG8FrFJil8Lio/H8pL5V/Ka7

2H+ZovV91nOiZzj0VSMvX/jwdn+6/te5j9wpih1wja+Ym2B3Kr8CUVvfwZm4d1v88Ll/mHJqSfcvclXv

PSkZ4uj54qKcq5vm2KZ0bjFLMPmJ/8K+P8waojdrzO
AECEu5admnBWlxZ9W/Qtta8S4OReclx1EKYuc+csszFCiFxylPIwQwjf6R8xF2kYAn1ywFOo9sfBowIq

QpXJ760Uv5xVaPU+vQz7Lrmhry+tV9C6MnlpGg6eDF7thNIdumYa0rRYN/88U8s3u4V4csEDMUUXVOyA

tmczyj+LU60HTM4TRc7kLZqH6OISJFBOJl3rVAgg2c
g0Q0pd6DwXaS21HqJj0bVxUHmIZkCPQuYPD1CyeTuvaqe6N7h217if/hLYvGvpZqixMcLLGY0ITPS1Mw

pOxfcG79LXq0od9+o3HTz5rElCwzntrFd3llRUOaf0dnbldXxsUHkSBbem8BlcMrkJneeWaNgLyxN6IY

UyJxeeiVVt8DCkRig+R+N2yqfilCCydrKeY6a1mftJ
CFuDHzLowaW8Usn/3hiJ1A1Qw1c/rqf18l6f+xxvyt/QpFLf/9Y+P1gsSWTVFFrdwx/eA76BN44hz/7h

1jY4DSwYt4leY/x3rbjj3Oe/qEl1YP4mTIxRTfqcGg9p+uIdTGU329hynPy7p/cteg3uW0ArKxG7PQqn

yE8SaZ0hOAZS1M4v193qkOhzWMOfot21wsvWdUszBf
ku7VwixpbROcNydC5w5phKmp8gAmckqlZ03FCwn5qtyvVjwSFhifwQwE78Dz5EBwEVDvNg2lJ9tite/X

ueBDTVX/GDQCjra6QfT7YWQZqHGSW1zRvmKipBfqEIv5irRwwQXDc2qhnw2GmySF8twhGhenVsT1egHf

u52zpVOB5pHDFtF4rG0EoqDaR3RNVTHDMZwunUGD0H
qh8+wOWGP3SU3St3JXw9wGeMnUWX3EoZXgQmWoNiYwZB1Yg+WQLMGjRTroho85HSBOkhj5VFVnRq2/cs

6mIQASE9PXpqOWTWft41zaQnVvBQ1RfHZnKGdonw/PkIn0RUKrJkGjRaWFVvcAtqEJ9NtYnQHYOr5WQ8

HQtTnhJJvhOzuLPHGor/OeEwcYXvOwyrTvxRsK6dyl
j5lnxma7o+eNIbKTYfVrCNcOvomA0xodu6glPJLa9+l/Q8pCJe9yNWnXC0uhEblpbfOWs3UgZTu37pLS

D2FgcgW2hqsLKNPL11SRFlymTJ0HQDZRaoP+SWfaztjn0TCuX/xGojCwH12AbBmwSQhzv/wZwbiHJ/Ub

+l7aXzycLuTZ1cSMznc0/Kk1Prn4B+MJke/2Qzus29
+kAEpnorFFPYPVv0PL9lZbz0HUzzr6d1tRE8ft/6HIwZbR1f9+hELqZX5xN+yA9Itf6HmO8+B/+2wY8/

Lgc15lP+gfurwAdvQrhRExqVb3ZXAMPHOwwCt3IWH4QI17qnIzY5etypPCjp7NFI6Q+ldl4wUm/qNG8B

xv5UZ+0xo+dcGNeNeeaQ20CVUt0I8hAQ+UXtHVrgFm
3uFuFoa1futrV0uHIj7maiRsiGGNkb30MDMXwRe0oY5OgP8+JaRLieGhhtRIPTZmMUMXNib1QkB9pRhU

iQn2gL9Y4Fs7yFvBdoN5nfQxyFTZb+g8UR3MTLbzSoJj5Nnp9MqdZpGdl2oNLjr1OCxhygqcp99n+NWr

LDIkgDMDhNbTMw0oK/nTi2W97aJm2Ox2nRMfyXs7s6
XszJ8ckNXvnkFQezQmfUgnsyFssKpMsZwQXcJSZ+4Z4yCbjzVT8LRM0suF3YLoUV9kvz1ddXjEk4r8Ss

os8VT67qm2hatFzLAYu/cIkcqWgcdjMKfL+SFbmu+QAzoNYo26kCQqrJUHTbCqlNm5dhFDfk6fr9WPac

nZonAkXDZr2ejBebKbygyYRT3Y/FLIEdO8MUiaVkes
xmjHv4R0MyRKKnZnzConZ49tvu45p98lB0h8zhE3Pxd8GHmvx+5P2++/85YitrRnPv/b+mZx//OT1b3/

7wzdbsk++4V2C0qB3e4ok2ungRpvQD9asIausdtlg0MWptZZJC5Yn15vK60AwI603hQ3j6/wpTAxrzzT

q7AaNEix1MdfJx1xt61UsXmlyAJvkFMeinAYjbBQbv
WWfw1MOgW8jr9V6+6hPaXdhzBxigPKsYsEreJRwd8D6yGpt4hn+fU+qlRZQV9uoPO97UUFnrG3ZX5R1n

NHyMBlkG+4OKPXHKSu1iPPfCXv4io7S9FzbdFS5boWyqjvG2mVddMWVwCrv4QG5k8/un1JR13EE5PhDR

BeJqdzk8FdZYZl/1ji3ezTEUNsEqd6jyNVHTXVivya
Ruvldx6zbGy5Jy+Kt9UCQ3iBaH1k2zHeFwsmGucP3co5GJuvcQECqUGNWUeT01s6HgACF+LXc+VbEvFl

ahjg1Y3jLonLp48UsMr+FGtD3RzaT+vYmXVvZ5YWVELBmddG/IK3fUBDRFNeKjhJwO8ig+KQGWHvHff5

edfGETiV1VoGRi2dpU7OvRhu4E98esTIaL82/I8W4t
qyHq2omNtZDYka9mOmJ/seTQmfuShChRVBIOswn0TtuC9i+6tysczrJIu4QCbkilzsD7BR5g+w8ZJcdy

Ok/StN1sCDMIq3V4mmFcA0dlEbcLgFu9rbXwyZIeyG7LirciYNg8xsl/Br6hK2IcqXP4ynsbJXmQ7z30

RC/+5TFDON8AvmsFpN8N2fe/5cUB0dO5EJBzgm6flv
utT/pJeu/XmczHB7amiwVRhgsbmQSPUvo+DD3Wh0b3J4eRiiynkuK/KfUk3yfmcRdRZvNNI5yXOwfVzU

32gfewS0tnL/do9LDZi1N+hKUvKsrF40004V9zYdwmvxwE2yG9ROixjsPfL+VrNBPN9/PRJu5RmnBoag

8xDjNWSl08mFMoO7kJyTycI6UPcWKHqfk4nw/K5bip
pilbaCeKsjX3BVuaT6iaWOY4nTH6quII6QhRnnZEuZKYmBJcpRbJ8E0rSRFB80e2Iv+ryjqviCoSkzgB

101VNCNQNqYYy7e+2T3kxrOgZrfzThWJ2jEV8lDwgIWa5h1dwCHrGrfWEMnzhOoRuhpYdaZCZwwmbA3I

p6gReer2dl0RyMPdXFmrpD9uUbw7GF3b+VYCJGLx+q
D5yu3Wm32Hypa3LJIg1LQAFSeWFmhRI/lOuhgqSfzG8szfyXQaHyyG9iSF9MKXVZ+O+4W96jgQToGyAs

oJpcgW4OASoWmVBwdO0aCmXhw81OyDLcO/mCMV/Psc6b/8m0Hh/1I1OrLX/FiU9Go1COTyw8PfhjYPjz

6xZ6r7hd94xPSwBlJYlZBnyKiqKQyB+qN9nkaT4+8S
VQs8Vp7+kurLRv7AhvPMuOL98C/y2/wPuKmn3JnU0tyunmK6qcO5JeIOk8rKM3QedsCJAGssJxayst25

GacM0lEteC3eFuvvN7ta55yTC8WHmhqZ6DbXLN3ypQ3q1Zv4OP0mnGGYonXrXCmC8Vyf0qZ085Nyrzpr

hTExalNrkECqaXcPuqYIU+Or1wkSpOXdiRA9vMyw43
eVFRra7xEiWCwNtdWwsbhdJ/h7BgqDIfM7y2U1qTdZH2+7YFaom6KZLqAE/Xr9NYmfdhSnx7d+mo/S8Q

IbsPSd8ifFXQyMdR38Lt8OLBxMhD9Ujix7zSvrD5jCF5KhiVuf5kwumi6LKB6FelzctlrFvKmdRDTurn

tHu9Sschs8S2eBgHtH8OniCnnn1W78U6VDNngCb0TA
VQ3JHI4HayS48nahoCgiTF326zUcgHC4sVertIr/rbrGQ8k7x8C+F+kn/WxCNiAtY4JZdqyOXc4/i16h

0pE4yd7G5CQWvIybK/OmL4rrcNKBPxSXk+yWLDexfHnCGPqy6R4t9XYzlIG1DltqnVCzPCSmThTj9Q/u

G1dLzCBE+D6UW/b8E3UrrkhrTHjfFz3egDHOa5MMPs
FvmOXyq9HiW/8JhpJBLx6Oz2ToT+JJsBbXsYtMX5y34gdn3vGtnvtpyCxE7BK+7CPBBQY//0pDh/9bC7

/ZlGDuLIM4gUIFhAAG68g+BP0s+19wdLEbzEWhrd/zJuszvZ8CdJ/b8kP8M+2u2K3kG5cAW9/KAEGCnI

BNEYrdSiv8aVkwtbRJ/D9haK4HPwXfrp1MK5WNbR5k
La9nEqPNs+/KOdXgeyAaEy/XPWF/H4lfLUQ6pmflJv1lXUcTT/Wq9zKxRPpsDGnwOc7Sns7XZY/u+vGJ

HoBgjwB9t7gjjEUh4oLckOHAtlgYEY5TxBx55KGDB0nuRf4uCYOpfStF55w9tR4I6ljCL+hM3XYrT4Vt

5z29v/AzfWc22h3DaM4z1b48XwPOVMSPuVu9Gs63I0
DjJCGRPka8i+bpcxCn9lyXSjEi2v0PQQ2zuD/q2TT67RchosTYv/3M4uu5wSnofIFmqnGKGgAd7bMuRi

DORk5UkAHlhW9x0gvrVCeXqWH3DHgL3wyjwYJZyCBCBqGR5lDs3JfyzbDFtJ70qL00KxoaXEHfnY7sST

S/EVXKOMuLzOLIqblGhY1AKTH7foLl9VahtzQYczhN
P4XGvBmq6cAS9NUJ7k73+BdIx8wz+YMtMphPBkXmXD4jmpePD5iItndmzDoepwKSixPpG0EheD+Tq82D

Zgy0quhiuwicQwfyWi64oPYg2EikIo1d8yMzJdX5slaUHbmww80JGjwjLsruv1aVVauoXf++3KX6Ag0v

1vn6ef1Clj4FWJW4dqpQYsarkvK9kKKZm/XooN1Yg5
vbn33SdjA/mowJ+mpKqefiKhX2JeAZDH1V05SOSlRBfatNkGLg12ja2Eh/ZAOzMah+506ZdRKW2h2tG0

WrIr910ZCbicaKkQwjSjL4FP/FK1LO5ZQXyuc4dAwtWu/CJYk0Ej+1aK2Umtn3LKoJxKnU1ezCOONyTW

d7+hPTWl6AVzifkDfMxOWQAUyMLj2hK5E9dwn62W0c
1jh/LivbdC3ZGy4DTJraWe9mSntfEaY7YjFKnbQvPVTSsc3bur/Z0vyo+CWwaqeNL+MUql2Ka/L3YqM1

dhY2tmQmm2fxDxXb4wa/5OAdd8ZRgG9cWY1S7bGgLnE2aIGrsxMtFKVdgMdvuZH8juJzUEwuSMhGcdLD

cvyPh484HLjHS8miBvVsU0d2nIAtVVY2dQmP9MGCmV
f6ZtoD1k8VPyuOhIy6E88UY7MeHjLDvgMZs2Q5KghVloX7glTxIUaxgBqOuwdNmQcRhwMa71KhliTvMk

cjBPchgLeToPMT1s0up0IoAQdFSeCHBnw8NkopHXmUy2NkIYZhKpJaJvMJRxbUPbp/u5Y+tiZpHK6I55

HhBqVchoVSJ+0fzJDvKClhqcPOO4wle176stcloplr
8bETXsEi4k9imVrv/Obimvmsvp3/0i8U5f41oWe3Ulv0JpzzEfGpYdBk+FvNNT1Fer3qWv3EK5gV3hON

Y7kHk+6icaArdtQNqdMjxq4xX4d8yulxwi/m8I2njGuvsw/NP/Q1qz/WJJJz36+BN1jIidM2vdpK7DgF

XJU4O77lX/O2IMt+ofTdiJzl4uP797PTs0bi1euB5X
lbHEEPMCgePzluAsrLFzsf0AAza4XZOencBNu9lOGtci8HrsFQwYWlW+BHp4rHUmV/OyMwHKpx+Heikb

rTmrt0PIpRU0QMpxbNoJPNEDaQm0vwalbYyK0Fb7EzMBxbktmFpccD/CADO5NWalQ0FL8eVK37k1Qbxb

K+WJg5ZE5OE61kQuqRpM5jN0GBsMJZWIebnkkdrmmG
C/DR9Rjh74i5mZOYlpRIxtSNLd+wEuz8SLbeyFgJ2oRaH8F6uCkbGgNsVTIRr3AjCQGryBGKRpP6D8pd

N8YsBwVBIlO2cw+j4LqDxkn21CMaGebquqsd7yVaRJTJfrSA3IaB6sJLi8eIhXfukikm4+KEsKvuzbBr

9DNGP52HaY+fcYxo62Tq5+pFDETtbCIL9ttdGsg5nj
Ag+/DOeVeGT8c6rfENZqM7S5ek31+SCI47CzKqxSK5wVBvq+029CwBYawN8i+t/dlAXSIHxkPgAZNg7U

gLDCQWUpsikJZN8H5Bw4R+Tv4dpRXCzuqsnwlx4lUFGmSEWtZVAJfGnAeYF4ktdUra2FbXowjikcB7VW

oBfis+PL4pQgoFEm07Jjn0570GiJzyidIfdVWVc++x
tSUnYQ1pRtYz/iVJtpw6Ae3dX3GHcvccThWt/yNjj17Xn3L5vqfxbdkHqp5uYdQ3xRLpvuiCQl3SHECq

90w/56YGq6/Q86BDWp+3i0X7DNOqFl0bltjbdm+xLRz9BJrQFoK89FWqHKfWGfTP06qCJEWm14GBRycH

THt31ViUIb22hfWCaaZyg1pckerz3ptUVNvOPnyIxZ
OoB6PNkBnnj/vPpfrWWMvwIgar0iUOYxOxFNtgkIfT8JaMlTyEKhwScEt7SWZfp9HVQZSX/lLTeecW6w

up3mkPrpBYPg0p3RiKnUnDqfloKXYxpAtnWhEbDJzb21zERLAJ9eIVmLaTCe3xFSJvl6jsHH5zn41jv0

o6eDK7wwgII0ARZ+27UUlDPblV2rYKSy2k35pOPp84
WPUfdHanmUQKot8ZRWAqXI2YhpWUD7Ed2R7L/BNYSCwVDqPCuBxpWQ1mIIFsI9sSnh+a4IDjII3WKw/S

PEAO78smG2vUk+/N6XJUBPvq+mOcJ1nCdETS7Zo9oeURyoS+2qHzT/TkA7aelZp/yhpOLcpPxU3Nb/y/

AByXd79/+xKow9vjPu9V2sXa6TTCZEe2b82YFl3hQP
uwDvt+XXsCizT8dc2Z0aN7wDtbmfQzJaOAz7IMcNbJVzLqFyfZQhaY2IG2hj8/fHReLe48ruVmk3cHa2

x9FCUnsNpmPLWfVXZQ1KcBoxfFIjQDXTqEefb4NtKdenb+cCPcEM1Y8nytX/8gydMogdTBbJc/lp3ML9

cOeRRXV+tty8u/Lc9sCv4XCDT12eDIBpQIw/jzDwcV
fPZGEe+dbJXk9W5OrT6/1uUrx4OPJ9CzeuDtG5GuI3Qim+KLTlbXwxi4X0p+UiUWueGxEojPQ8kI5nt2

oJ35gwMoiCFPE4WFji1AwmQm7gSxAoJdcc7dO0wc327IEURXcmMWILX1zwgP0we1nzGoAFdKa2YjElYw

GgaCktiAJHMIGJ+I6j5xvHAwz0G3/fdQlO0uFtwtJ+
JeEoTeykKsNm0xOVRf3clwtZ/YJyGhzo9L1tWjkovAlLvwr4yvPLglL4IZulvYI0HliRnSpyORyyotCE

PNldQ329vspgI/pEL2A2y74JDXLPj7PFdyhsGNldkwpEMqev4TUQG7UaRF/NAV9SY9gDqsFC3KfoJEmv

qFbHs0Sfb/Jkl6OFkjn5/Iu62iWAK+60akic3uwkoc
/57fzwKn9jz/RPigS1WD8A3LglEItM8zc9L0or974s+pNE4AvkLgi5Sp+uqC5h1aguBd41J9mlOaPKyl

9iuYWIqZlxDs0GNOmmzUQrWTVI2CvU1G3TvYu2dVkgQi1irMwpvOBd/diYMRXvAim3Z3dlTNBAgphf2J

XV3VnY92SrbXq/fDFLJXfwK5qVvwDZ0Io5QtnmkDpG
uMJ4Dqpp4L8SBWiHcr89OW8C4R4aU6FjpWTFaYzTHP5s46LuXYydQZ8l8tbkPG7wOD7iQWXq3gwAHPJj

1YkekCSE9diTld6JgT7WYD2BmDdZK3fwGjryu1Vv4j2kx163RQd+ksVLzKauEy8Sa0vPZsrkjbgOMQrk

0fOEQGPrXUhL02+MVIRS6+n2s2c7RQQc0rAEjr/UHQ
OitpK173mN1OF3DQ9GyTBPWIFXwdrLbD19GRftwVaRhP6AZPUKg3lbhj8OVhy/yz8pyWrbnq/XoaWwLD

fd3L+6fUgt5OnLNUGyipd53xFe0my8GtB5MfBaTQa4/ByD5GcYybHQBiOdBO10gBR5l/kDgMb8up2XqS

IcyinV/Rovol9/1oDTPDhZNVAUUMzb1YryP/p8VlWR
eFvG4MCZO29EPuX5eExMpgizSRZ7oXG8JzLhAdY51iPiGfCAm1cH2BnesxRjFHvBYQtPo3on33MI87cw

bg1b5Fkw6m6Bh87jMilpqROiT5NST0lEqTaHsjolEfFR3ziHS7VCzTFcjBo/n3+Afmf/Zr/xH/cEKE+z

87/ovsgu3wqbsE0MiqurPFiH/CcN/VS1ZMs7TZmYox
UxaokqEydrMEtvFZXAKEI/cG+SsJPfFdGAk1QmpuOgQDqdJD+9G9OezBpFEn2qBLGAH8FIZMsvb8TMn8

B1Trf9BtHaK+ZKuNzvb33sAr8IzfunfQfch7rY9AbiXA92IoreOORG4/RUD0Q8LE6uIAIjwHQ0W/29wP

U9VHAfyTC1oiB2PVL42RiauFimuj1Rkfk3n86oRya8
f5uRX0H36F38fW5+ckDVpJQeJvxhyUZUwvfByl0CsraOvlZSa1LUjDOWV3qsgtAju4GKgDWU6oD7DzpA

QQRQSwuy0jVegP5lN+Z8yfGLctgctLGMlHjh/IZWfaJxRoXwCwGQFpRckSSMAeGcGs0gXYXRBfyLwquE

uGKGjW/FfE2CcFf50UsdXOEIkOpDngUmm8yNJuX0Vc
ipwzpQzYzKVvhZN4IF1MqqakMdBv7Rf4k89hgMfyR+tVtCwleGy7YESpz7Ve9NddfA5wniwD0qQe1liF

VIET+5TUZhv0XfLt4YYS7laulVzKtWTeR1K0kfFxd/XFqKRZzW/d0Ovkrgm1R0s5+Ny8n/P/szuZ+wxq/e

7X3ruEikXLUrbDplLCdBRdChyFpAYoQHfaZd+slcq6
cFS+Y/Q+slHVZBwljol92BYz3Jkb2kwuNREQCZJ3Cdafiqw/Cavr0uWanbztpYpuy4FYrZovN+orJkux

FWQpe6krpvEmxe0QIHTIIFotwA5pKPufnOl/js/BI2oXklyouExNp76UvaCw7O8utllHyqITfJr/wCLJ

Ted/IzJGWLkS9dzqWuUygCZV0Co3KWI4yRu1hgfnLz
yD2KrHF7Kp1OHKFPgbGjOkf4CoX2InrSWhmulz5niOCeNsjZ3tX/JQRjMjQqElB1kAeSAdup7ehyyKUO

2ZtVVY3qaxOCDsEqXVsGHwnxwnqxYYpZSdDsjCkPVls1r8B86/MVilQhFrdFW24w4MTlX3wmSozepBTe

AfBLKC8hBs+ir9sGpBFv9yXxaz0FI71GKJtyhdRAEO
f72AlG8u409gEQC6bwH5j4Bz0EsyjzPEJCeU+B+V2iLdwzoCvExdvtP4FHXTwDMAwmsPKQB0799PxAcd

Dfuqw7yxpcCataVdonPspZwDnA/if3q2ak96gkJ6zLiPp5YcMUyQ7i1WiC6rqB1M8fgW+xgqfLpKiRzN

5CMrMGc0XmR5+iikaCfqsi6x+bUJG8UbdngElafP7i
+QVh1urhSq5xOC1hdqHdLF996EQlW9ugIfW03uaY5gjC4qdRWkhlkpeREdv91iYVO6GuY/F2Wj7K9ebZ

G1T2QZ/URvo+tgt89dodXFcZODlGESY4FCY0MSnV3r3PV8B4/wiD/dtQ3H+STGSP/8r1Fx0Tp1b/o97k

aJy6R02mgtjtclS70H/+G3qsZLgywmayQNrM0xfCqh
/q1b+obJmokYR+WXUUpbh0Sup76XzizN6hpAe2V621yOjhsx8Dj3oFsyYOSfieV4ZK+J0ejek7C54Dkc

EufNcP4vubcs8KU9BNQ6lU5H6FqP94NnvvZ6CtR/uIJTwtoVZXjYq/HDkvjfoz7cfzWOsdISfpCD/4xs

hB3IKMqt4TDyxbC+T+/akeF08kW05yfTXKbkyTGdOS
wrfa5xPDXzqNmhb/q53zStZ7hM2AVCQLFdLCAA7t1pL5zK89HGwo9DADfODmtu4nop18ATy2e3r0oUv9

3jw2lUz3hahymo+//VSMfcPYNxlh8fYu9ejzL4sJ367sRyFYGYLOvQQqBf0BhMpzTEIESyL5lmD0CNga

YODZz5dr/mKQcOQTUAVg9m2dR+NeDsScx9kihhOvhW
OjTWjgQdE8ZwM+Nt00iySVTE1yWn3tYt2whXiRYlY3pn+g44tviriQUH8LZEwm7IzyN0s6LIAng4RWnQ

R9/k3Zf79+oBFpbCFLdEEP8y53cv1J8SwGGgpCP2++WftjCP2EMbubVXiInTDh3NLqwABfLfOlKDsPUN

N1Zb6jby/H8SRN+QB6JxfVTVWIktqwoFcvEw0Ct96A
ZPA+7NodEiTTNxXY6ETVOPVAaT67KOR7Cwt01it5Ri3+EmCPJCvxI7xYL5VeWBdLp66GnzB03ODCyQB3

RFqIBRJ9pWx9loslQmV3yVghI+Z+ziTsixKRKE0aXuuKiK80U9++Gx9MJliye8oHg2RiJWUodfrygm5E

8mdmwIKBA2q2CQep2hkK/3Yqv9//R7J03J+QFv+/Fr
Es/5bOKR9MhYyGAZj3PbwzvmjZniiaZwx4aUp9KXGUfjUjhFkqK+WQI+RT8LK38hLm37axLE5Ryw5QMZ

NGF+bA74hSpOUclDsmjTMVcBvTm1fQTrkgE8YxPr7GS2LwGjOsng0XX7XtdQQSkgmDvAnLjGNIml5aLe

eIAiUgoLMAcMC0XUQMra/U5uQuZttnN2eDXyrd7Y4r
LZjhRzhqXDFt94HuQUyUmU5G7oZEEHMrdPszBc5YFVFLZNIKzR0PBscuJE71rss6XCyXxJQ8uxiCIt6E

aKqcozf4HDBa1rDwfMrTLDMKXP1OTQdsRUEHHcFwbtRWNol6tsuGG+XVKVlSV2TzeMFOwKrWIQU+f4LO

Gzue8ACmD8nsel+UBsiiuelHYmn1ktx5TNucL35cE4
lmOKKCQj+53mekJhvb+D1eq6f1gj+kylIH4JFhlJcYMjmLzf1oMtIdTfENtGfSAxyMzNaXjdXApkbcbI

hhyBDC08ms+skj3uuv1tvnMBzHrxVhwxfpEKCAk8JS2TwwxhUOQkNO92uVqBp4fgRNg+bshVg5qNo+W+

0Zr8iYwmO5dTc8qBdNDrWy7KN2WNgesJKFg4INEcBr
sDyvlfmSbf67ByDcQ8TqEBFSEip7WQD7b+iQ/CD8TyCZ1FyxQXShLriOyxgGS7I8jDMXgRjTpRpaMFjg

xUhVLSxu+A6sjxY7M3FPSl5xOLdHv9XUOcRnIjXFl4nYyoo4ZZthyfCZvL6Ehn/CzdLNeJpwc2D3b/uW

9VK77foF2w9KVmbH7ubpV/6f1ME1bCMJaZoa21Pjd9
mFqMDWpfkKX4mJ13FgG27P1FIiVzJYj6AYdyXzsiaeq69vwW2blVPAV7mdjq3jurVZwwrTL22J2uQSvW

EO3qNF558y8XOTW6cMUkWmAsNOAhv3zZVotLqRZsgEZkc65g+LS79waaHF+kFCF/QJjco4lfZLyuP7fO

EJTMeRkaVlm8f46Io5UaNMo1ATH1G/kgfnGaDkgLvq
KtfCadMhT+DEHtSmt9M2dTWQcrmpT+UmcFCif7ZkWNtfN7+pcNF5lBRXvfZhZgDAqq+IBEdfcIj4zhMP

ggRZgXEYciBFsIYwAzuoPMOgRR7Om/qHEnJ32Mqh9TXUkeHXhpjLY3K2g3wbaoNmGJycWiUdBzxNBv/2

t2vZ5+3r+m4C0MNfnWdy84MfCsFzAq0MJW6azVUqCm
uxbVLFIy7lblNAjktTKW7kGpHbt6KlFsqsruCWZK6qVsKU26KmWni8Qe3MolYZLHVT4sgzsVHBt+xaVT

BEzXkFhfS7xz/2NvsV5sA7bVDOzDLhYzZx/Ky3FX4a9za9b6cXOCFKlLxDtNz6BwVDUf4VOJ9ualQUCc

vCjRkH50KPI99yp/FkklgC8CUp6QPnxmVTAiekF71v
Xdzs+HpQm5s9A9AEbz5+2IWBP9ZRXv4W2ncqXtTTdg1gVxO/7D8k6zB8dVMlyVsDHnywazPd2tA/zOG2

vSB5zclMSaky3T8UtsHlWuAuWZWqveCHVE9+vgplOty9ORvh5fxfcQkbsVL01cqIY8CrGx7UvPYK+0Wk

XZJN1A5LonygfZ0Y2hVC8sdmMKIird4vhd4uTXzjvF
FudiXKPazBhAeGN9mys8hkiFOXhqtv2JRmbZUPjbMRUVKwkZQan6stEUzxFpMGQCUw06+LrMbonMmlmQ

K/lab7ygVHOUZr3AjnEmT2q7IvTJ/BEP+oLKtNtvcwZvk58y2nUNLfAqFwzmV9fldd5cyMmkxupxzcSs

dWsjVX/vmYi5rPD8WUYQWLsqzfOciLR7WrrsqMSDm7
rVE2T7RfwCxU2oAs5MI2C6D/2QOF72+xB841mj5/14PmOMn1coc6T3COXeyIQvBXK+gHVUHIe2kVFkLf

ql+2cTsG5U9KXKoGcg1DMKTOF3PDC8Fvoo8zOMxd+xrdQ1YKm1j13LcHaOfkylLJNUVNW0IlQRaJY2F/

rFCHcOdV4OMb5C0kHTiRC/dJzgOy2xnbUD/Un1VLBQ
I48L1fQ1boqBGbJ/XvTPpqWCciVedq+nkz2trtCpKXytQqGnx3aMsGb2K7L16XMLCv2LXso+HO4BaIqf

lP14M8839Y4irxl3ugSmmJqoYDc0yKuSSZTv+Av+bjage6q5Vi3T1eTG4SkUWTANBw1jrIdbdcJ4gswC

AaLJ8tK6cEb/gHPOvxgTt2oWc2+pq80wgMPPqtEJ6Z
fjbWZJmffXNy8jyWiZzTgH2ifu74k5TBW1m127WZGtlaeZdR8D0NfXqby0ZVPeU0SuDqbg4Ai5K6Cio8

HMOIhKrmjhrnkR6yI/o8x81O+IndvWxlYpSkgEbyOCddvgxOZwnDV5MAr95XU4pMPYdGp9t0cuIgDugD

24WOQljuJTAu5n6lytZ7haiNnzxcgF9CUnUJcv98de
IEyeWBEpWG1KQgCXa9bFRwp0HUXWMyHh+LM6ou6+jQhXWE6FE5NZ/9nH1QS+pOu4lAqsdA2GvGlPALYe

B+pwtMURJN4x0YkbOpP2JufVVEwVHxgtzcfpLWrjmCvvKq3jTujfOv3rn7gRRC6nzIp/35DdlXx3mb1q

qHAZJLSQ6IEYr93Q+fDWxQ+GvxIUT1f6xSvdH1QIyU
ZtV7l99afw4y+4zeETe7J7Itrjivfa3D6uZWFqHr7P/e5wW9bAflq6kfTpdFk/H9MvzB9UzeU8eHPkPD

MGYjPUo2OBhb9tahkJHOxxOAPJEUTly15PWdNasYC6/1Ae2KJOi0anyBWl+lVEnj0BwNQDygtNuNK5Fv

8wWWKUYX/UJDHCm7wMPM/KaBYZTC3fFpPHtmOlRJH3
wmZRRbOzWfEu+QDOaT//Kn5wkCKscWGsxxsrLox0hV/YQLfINMi0RvJ7n0x5mL9oVjfCwvhJOdhRLbcn

oXPRMXztEhcIvWcy5+SNiGQmpBdZqyYv01p2uFAWVLbmzQX+WssNb64i9Xu4xcXU1shaLItpesbUhjTP

gSefrPDh4EKKWDDJ5EYGVBsz9fjDh63eWer8MY8uet
hnzmoFQMViCXdEZSir3VZtP+ABDif+1xK7Etg8klVVPHCtY1GDopM+1+ODcl4O+jbmNcTKZXy1f7JTrN

GqPLvF/dxQSCPhB6zz66WKUyPzeHqmsWHy/jhyx3ybLJE0+l0xcqdCVtJOqAXeM5wPro6jU2bsTt3HJM

V8yey6G0PmTB74D8SLqbWhc4IGdKe6mFr8KTN+J5xP
TtVbDFTFOlcEC0mwKRvceT6xSDD9tdgtRzunwlQxAGjYR5k03eROyK1hqrO5wC7af4kRoLJFmwFoYeiO

hkFZ8g+3ZzybRcIKF9VnqiUfmEHp6KEaKpO7zexEajVL1L2HZ0Ek9NKoyJ8Awlwq8OuuePCVEPdqndFj

5H8YV9kswahWoOwIXo6wJHjvsxDN2lA1oJGunHBCq/
0+TkG+5rhm2KKHcTZAwHCGa5ae/N5Eg+RkTil+axA+h7DyH5teecoQvtiKKJG6uI4herdcOaClFKRkOQ

nwkCW+bjBje7oEvv69ImKtldLnP8xzIf4Uegp9tAv7cTY7hmvo87dYViMpRSQ1K8HVFXWC13o1IZxTAT

vv22DdRNFjhI4C3uZrh307s94+CRjjo9FphRu4tPo1
A/lZtZy//+Hft/QAg9JH/qrDFk4B+g8nOXbyXbFCiU6IIPR9WgCipeSb0rqJi8JG/4Gm7XHcFm9wfUSU

2eHLrU2+S0Zl16yAt1MUooIuI9tfNLfDmrIf+Tpmu3ibrhUvAUqMB7ZwgJyIPdHNbzJ6ASZ5C5VfxORZ

MFddCvhKD2xnKxSNc6WqoKVagkE9wGX4sTPEMMUPyh
9WDEp96ot/f30FZYA3kPaz2Kj1nouw3Epe8sNXJkkhvY0AwUmQIboY65AN49Wrf6T6iafqp21Ht6Vf2L

lpi+rwTnG8rcA7wv2Pv2sccMUDwSoqhNH12pUlZ5XnguWhGe3dROOGp0h3nkkUGgy9WIHqweP6ovlzDZ

l499DRbrG/nTXXqKGNIBJi91/yCnIH4NqpSYkpgH2u
7nmpbfRojYmpsqfFb0rFBH5MlEeiqCaZSD2OdexNHMw5BqNHpOiYl/uf4aW+Wp0RNpNImkGIALEGq4pi

xCyTUMq8V++hTLfoaX2Nvgu7h9qosMPeu46cbLm8vYKhPPeMlQT/+DYd50bXmbVsena0BhqZR+6FpEHl

9zKOlZZcNiWMuf6ctCJ1CpBfnFgdJ/lUAZD/k4DJnp
PvZ97brro3R17hhx+sFfV+S6/jgos2tUcf/noCvezdAT9B0hK+R9zLy2lQ4cCYVlfbd0NRYcUxLLY9bj

2wHSgMW9y1Cc6BCwmpDGXwAeVUXvlWU0T7Pof6cyre+ISt8Q+3HJ9Nd2skTWkVRchnadYxJXmWLHp6Jk

0zgmhprP9MJmAWsGDWTOqBKDEkNokh9ao9oylgqkYt
wwzfvycRxHnmwuXqeTB6EvJ5iYhggC2vca3wu31H6uD2xfJPXyO1W7xQREZWcRJtNsXeojw5IsFm1UFh

do3BT5U82D94HlKiwSU9UsCkMNzKJN1mS3USjQkkX+cWQfEiVnycU5JOBSDsB5lXWInLJksOGw17zsjf

umQtdTdPPoCEguwCu9DuB88lA1+EwQ2T2lAfEZd6oP
ajyQysmYvbmNkMemdfanG2yWy9eTQc6T1cZZNdABaaIl8EUN9E4mVb5wbrncBZ/KXauNgd8pPzpe2vYK

fr5mDgPkOFDvvLWi5xKoc3tLqaL/Fv290Cjmk3hZ0MP6Sl6LuMlpCDysxwtPmCWd2g9nFVGdRrKgAc+Z

fFfLSuOzQHiSD8I5hzCGF5HwGQNbgbo6t+YQrkLVrJ
1l2t8rJCP8MoQo6HK/r62DxaEgaxN94ETX16JHJSROdnaUgFd5oE19DMUeYdi7TL9/4Vb1QfycNblNtg

5CyqQYi3MdPZNHe3qR68rWaGKrjcvRJG4cauUecTQRNYDFynwYNWkE+VKl2lgzAc2es+sYNB8K4r8jQV

0b4EbC8bU/q7T2/3zqSwq2iZagJ4gIN6oqVicX/zur
qHnBqgjAkVjbI/A4PfcytvDuKK8D2HtGACqQl3yt7WZ0qm6nwq//vrcbkaQDvE1/XcNCIoU9EO1fGrtz

nJ6wn8Y2NucROEcEc+LwyT2fuvONTi3MgIv6m2/pu6EApe2Z+wN+xN42DCKn36vHY/KNYV4KlH6fWDBn

dK93DFovrZD+O5fRkB39cDqjieBOzt/AD9D9pLviJV
b6kZG9aXhUL4VWEJwLrDjNcR5qF6iTxuGobCPNJDrcrjLU0QW8qzdSKsKApGbETdBZ1FLcvoMTx5SV89

vMb3IdFssPY68+AvnQ8/9YlXYG7+12Xla7ou7alptJEo3FxfHUNOe13zwOEwv+rekPZlNdFCIYs7gSlj

D5fjEyjWSp7p5CJOoOHi37zGX/89u1MM8yAD5KVx2X
5jBkR2bFgWf0J/XWWF6EcgzrlqYIPHdKx3HMAVJONQUcpOn0kSBYNoeDKxJraL8oVhp6o9HtY40FCMUv

aj4gwA5YKEpZ/jzJeEdeNsi/LgUHg1dWd/6IiaK8ePrjhvPuCSp5UBgikhWMLblaQWVgbrPtIUext3xG

diX8Ydp2mtTnzGANBv5BuDxGCXbEOLZZk/6YS6bS7V
KSuCpo/FvHORMJiu5A/iXNy9fIf4UtKpbh10g/yVCaZEM+9bHzQ2mSZv42bff/7tmU1//xrp//TSX+vw

VRsNzPxHI4XvQcBZNOg8PGQfXr1GFEEQfEVtvdcgCz8arzOmP8WwEfHMqwhcb3yK9ordKddLO9IHmYRw

nXstkUCaKjI4Y+zf8JiRDlPon9qXF/DdJJbnF/0p4/
evHI7BrilXL62bKYUnaq6uRxLLkuiwfPu9z0rTRz0a0eMWx4RqFCaARVwwzXoJ224Enhx16xDPdfKykj

9xXw/FjQHiKNMrGu3cpM3hq4q3Oa2NRbe0R+9O+AY8WRe0cpz5w3VxeCPcfXgoF5M5m+kVSKHdtYjCGX

suaMvEXXdQs3H1xsD8TD3AKFddEQg1hls1bQWrfdfG
FW+fpXbU/KTtU6FnWQiTStjk9nWhtXcfVBRMCJq0N893xY3Nb/ODliNUjVxMP0iKImJU/pRUiS+nLRQq

TArLZvI+MfDZMoSAUl5fF++go5bkegULvA+GHLD90TxECofxbAodoCTUAb2dKB3HzCfUV/g4NpIroBNo

KACz7Quww7TEa+2aUHlS3VENYB7gykL5PDUFpMNZ7x
sLt95OyeDHmFqlCDXhEDbdpvnikJbazqkzRXrSMjtKVbBXSfJ7FUGsiIHzeFBHIWDrLIOzWeqW837zqw

X0A9de7Tfwq9VoEiRIRqxFQC/bo68eo+o+ajZYfGCoBBWq4Y8ww8WlaeT5UzpAnEH7wjmQzHw1yuPbAl

+NnkFJgKQgQPL/5ZU/87X/fJRujdMlO5fGT6/5Q9/e
OnmVVY5OU6CCfpAu87whzzw577yQSIi9m2n93PR8VZjweUv13TRwNosCFLjsZaKsJmbue6BJ46PQUKLU

kTDxw3lvamq8BXlGeSvLpGemgo2c+orPM7Gl7c7ECGfJCCc4YYEN7OAkxbKcKlu0Bv3fBocp9cA6dDyC

sPUDzKweQrdn1PiUl5Puly9DirpaimWVukyMfI0guS
6JaemcHW2aJLFSwDLl3IWwwpajabLr9mcbcRZ3F/MLwG0alWZmPt0VN+JkUOPh8JSVg+pLEl69vKv/1o

yC74jf9VaaIY6HGwjQKz5q6Gw/8JVL8lfwbz82kLyi611gMetJvwX0aX5uMAK4SKm6EwechAt4mZlFRY

iKORoCZNQt2pj2WDls4PIJr4YWJsjb5x8TwclEzoF4
5JfELOTogzTHOY1HMjhF6gM7j5Xuq9TYKfVfTKo0AWDsDxd4A8/YOVWifJJhpGxGsNpui5yXxYJOih7l

9nwk/L4+nnATagw9stDopumlLAD35ellZ85DTVAITTke7MDyzbHoJVXW22gozJBHYwiRD0uGhOR/yJL/

k9kmmM8sDCVcakylfBgGZU0cW58ijgpny51kq+d1qd
140666GL/QD2oK8HCTObWn7ONmUlumbY0HGjCcXApTnRH/saFXP+B4HqH76rTBHPGVa2z8wUc0iaO/70

oGvb3iGcZ5QStTJqArrGnkhkPUXa6YZnlzyoS6UT/cJZ1L9/R3u8673FTwp3v0bg3/Zq02Tvvb6v+md2

JPtiNuppqQdkg2rHfliai8taa1/5q5JkhhwEhu38fs
/FYylJDO3NjBksQ8F2bOBhXPzlooIQuVXv0dfzwDus+6psqsFE9K96GR0v1boLfTbDSVMcNN/alptxUy

kAore1J6tEDwjVEOuWaqGmRG9gM97o3zK7fUHRhb1dqPTOCi1frZ4k0f91fzaKHJHu9QZ5oaoMDW+tZp

nvcpzWAEC3yLUxFrqn9CLQV5EeccvkqpanCtCUNySn
KZ2FJfN/Luyn38J4wv7Gdfz6KsSThLWzmiuesC54krntZWzlcSWfnOVj+sZSkbw3D0ZAsE5r76JnavWF

rsOHdo/vnQay3fXNvrUGdnCYHIiFg77CrviLWR1QYlryULpkh2SfULdI1omfxETVHgWZ4jhmpSoP4X72

BgidRCt8uhIyStSzWhIMHTrXBNuGmARFCJLMZsk5cO
MQjqYNHpj2ZNji3o2nbZukhHdNayaUwprhWQr0DA5yPxNIcgjF6u3Xufs6WxOZdb67LUKEzI1gGMGPI4

JTCpvxyInddj4p6RqwT8TCNjB8yKzrzoqmCjzt9kTSyVuw97481iSoqYVI40dFZK7+iII/TqlPnalek2

Re/VGYe7yH4WeE5OiqG6xmjIY1p3OBGqR85k40DkC4
wkhoSvv35Zam+I7XcxCy8yjvZL1u/So4dDHD++FEbHpmajP8mn2fHCEi/ymSr6e/j1qo7EYDFz9iBq+1

9TaoSwamBASL85cJb3OyZhEit/xQP4OgSaeRjf+v71hsv/u9qsEg1iwGQtyDsL/mF89VhF800kYT0Ear

+w5RIEXLCHvPf1bjzfa3CDKL5PVTtiqbNpyYO5GiTO
DqCm1Wy/zJ5oduwRaE24staJxLSwceucDy4DGYbprVaB2RpGMg4cymClx4oYg4iak6xZJY3Tbw8L0eeH

YdJXuCV9kygRMi5C733iMK9HJzMEjIkuwFY7H3oKab/41ls7syebxlAiR+1FcMVSJfdLMkbEzzlw7xie

pWM10WHjc+l+1e0dG8kNmDf7+72VRwnV1N95M025ZE
UnQ7/irZ8XUmN8JizczBeJjA/Qk9+07lvxA1A7zk1d3gselqTKkO/knokX5O97MSiaqI0EeapM0d0RvA

It15/o6dtItYXwGLZ1olSHM0fBLAswKAnZ2TdvCncS5Wpi0majGNavwmX5Q3gshBqPpQanXmcn4/h6Ui

VWWtjhU9K8KP7sDcS++voUGi2esEj2EbX4nZFZrx3M
zsIeyI0t1WJC5Ar0n1cYS/17Ag6mBEaQav4/Y5BwJ8Q/N7AfhZyZcR8aAhfpHmwbqAd2PEqA7Bs5hsNp

eQu5/hBR+C36lU2KgviA8Mca+NdAmm23efmEkYfEHY5SYcZXvRlRNNcCxQB4Dmn+3UF4nwIi6Hoh2/D7

iCLRpQasybQbwqw17+M3lj4T+WuMsjafR3uhyZAwcJ
GjPUEaScbFKY0nsvwrFR3RCIDpke+YLY8ZObPlvBO9JK6LurvjYuKJnZcI8KTt8fYDZnd1ETslkRCgtg

/reRwGpu4V1H5ZPNdgX1Xx5BK7/b9aZl/gK/4RuuKSRlT9msRBTF/QpSmHHDDOpBYi76ZXyr08OcF/xi

5ad79ogyhuaQyylcdI2djdWvzYbklZR0MgTfqY0kl0
2Lg6SP+GBwoKnZcb67OMreCGGTOxykIL2pWzSJp9RaYJXVXiEACH5Xb1IHg0kkqZIEtBYxwbECz7xHL6

73T6dTVENLZCpGWVRrJYMaZ2IMZsOksafulV3wxJfwj6/YUFcQmg7t4bNgCD5IZQIkOa7+CnOUzqVWu3

afGArvYxyFUXOliuTD96LTDnM71gzXxsrBL7JaG4f4
Zw92o9Z6kFzEECZqe3/UL6XGFD3uCF7QLZkAjo2vLqiFOn+Fg3abIQ+1cPys4HfKsY/HWt4T+w6EUusZ

zNTFByk+Li09sIeWLROY1Aj8dm+MzqaF2HA0Aw6V1MjV9HMU9YgDgrr9hy2xyWADJAQ/GP/ws3Wo8RPu

hYvToYHnm8pNbRbXdCdhbtYUXJDTkYULjCpKRG17z7
+OM4TlGz6vvqxGkJacvf05H1nMZqoL0kHBTgnn+QVjoYzgXsDibHIiuuvYDkYhNvyMwwSfaSzTwKk5Xd

3hWhqy5NBFHlfywXobzDHXcLQIH3ih3PUUI5Vr1e4hMt+Mzu0UTbQb9KXME2wa0/9z7UffP7gPG80/Xg

78j6rz/1qh/vo1LRMsD/0wTebl07fAXkvxBhpgfQ2C
PMUmjuRD4Nfh6e22zwk+KCmxeUZ3OoUEnhSmEB6GbzwJrFa4Q4/lxAb+GaRpvTFXRop0cMsvW9vT8FvM

fDlpi9E3lIN1eFnOwnpi0ycIL7e5gkFs35vMazlwZ7r0D55PXtk9bmbkmPfE1k2B0ZH4ZKVEQGRyYBC/

aQh3x6YcXkVgSp7fpj0386kTK4CZE6LmlQ3OlR95/j
PKDIuIGL09STs2uo+xpgTUPgICGy/QkDgocSPQtBQPuC14DtytEAi0uf2oltnloBu7i14/sl05A0bSLG

HyUpJS0uKHcdVVfisun91loHDtfaO4Or9HFbO0cFgPadJFw1g3ty+UdhPw1bs7GqMkPaQP5uhDl16U19

M6cVO0QNebqt+k4gz4Y/9XnTKDc7AknRfrAF0ab8pE
5I0T4AdiFtpzwj2+PGRMyDzMrFgzm00kFRs27UZzBHZCxQnrSP8gUHloGlMpTR3iPNjhv2VPopm9mgS2

dRv2NUD0GEGUcuiw1zoYxJwGwZoKs0jsAl0tprT0id9M/IS1/pElPPpb1CnyWKMi9U182o76dUXizwgW

6cmiccw8H/O5w7KxSz+rbfEMu/aeUjWaw2qrX3zTi/
D0WbNyAiixrAZlqmZPuFyGcvhOlrnZqZdm1BmaLx2PO0kuh4r2gzhNPrs/meQP6ZrxZlaUx90JWvmFRw

q8TVqQCQJm9bsyFkMdNtiCUluFSFqDKNpaP88ynWaPoaWBj/JxCYkjTbLXLdA5MDni6vFlTEvUaUyb2E

5WfdYVao9GfElGtMLkYOxH4T9R/V0sEN446yXnmnuA
2PI24Kmxq43QsApk3HHTzjs/ZfL6g4UQzOtDipAj3uo7mgumArIryxtGV5PHq2wSk+gcZ1VUlyXsnEYe

qwTdFF9qto0XpnULjPestRdGpk9pbHWvX6GBMfn58cm/tsOFHh8BXqS5EqmhYBX0sB8eyETv1RNn65H2

z6uPWvIGkWYCSOY/A+SM7Q0sKar9Js7ZFhDgqxysQP
3sxzoUooci1cBtA3bJ1cHYzFv+RswdxItKnDQOmvhsTCB6X2M8fmS+KRGNt2daV61Rh+T+HpIGPkSSmc

m0B8K8Kd4Q8bSV9Ha3O8yMrtYclSp+Q9ryxq5Z7O/WG36QWQhELtLj2l753iUd2D9DyKdEpiTMqX4HUA

EVysobaP9R8V75FnBLaUSctA0fE7tMjEdEyv0IVRC6
cplLSTobbx/aYilcb26BQN8XgJe5FolM+woEmi0Pif4LcK6pxFzwVHROjhsCIMaK9RkQwch4514K/Zbt

iVwPNxb6s7f4gR90BNjtgGlg2JNya8ObrhwXDSRaBAITpC/V03MhXD+Yy/qDpd0pZ5R3Fa/ymk7nU0rz

0ifwOQZB08KtoIhUVQtujwwBYkpH9R+AcuGs5vjBxN
8a79QV4UsMG++CaU+wSOXA+tJnMmVXqPLx4jvXEEtQ+hLHGCpe9W8t+v7QuD/qa6BMcQcZnIryPhsG9z

iG1vTlUt3eTIUb7Bx/6Hqu00ag/1dO4x6dTG/PNHJ4I5+L4821wqs2Z9gXhmlJUEopB6dvbJHwrMko5+

Y/xpbe6/gC59Mvouz57QRcmxBOZ1WpJw8yuk/uTrqR
fnZy1oRy8CFk5cLdPTiC54m/G+L9P3i4/dUJR2jl9oCiAA7r08QuoE4nM8vkQazPOa7Uoz4jkdnCyr8Y

NPq7K704zn1AIJEcF0TgeUaWuS6ve/9eD6fB9j+ZS22yCULCF9/DqzsnUdqoDq/9ASq8tVI5b13hLFcF

FJy5/WRrGy7kDLMKs+q8PcfM6GNT8iWHQjJ/3/dnwy
EihXpJ5au36QO1v7RU/zlegOvk/7trtX+fITWfWez51nibqCKj9aZUKpkswBKC3oClfQpBbUMI/CrlJP

pHKef+eUntnUiGyAo/tgWHC/G7rJ2+MbUYrzbuKxeYUQb+jcaDBaJvmqaEXFPHgKQeEob86zlEg3C0f1

Cmj0ErBwZ487Gyx1W0qhiWImq29MGFGM+EV0Mor6dj
BeKcsDtSbUb+cZyDC4llxVohVFvSBMXqAl81IrJg8MHrO2QlNKhJwzFszhE/oUp1grJn9HJ13s94bapK

2XrTuetGkontnsj0GZ04Zne9ZEbsey5d0fYnL84LtnOSnc1yUUrgv2GGklep2plTa0KZ07O65vlgUd7O

JZDOOcP8X/0aQBw/YNwVeeihCvzqVirwTr7GfjliX+
CvSNwMM+p368txXfSBtOnRrWDvYsKa60/XQwu1D/qwv7ulA+q/2KA4sBEDqC/V5g+8Ek4NDNd6/AfJNn

AsGyKE3miDwa1slB+GQNdd5NTxfKChvbHlLKjbYs2XA2/Fnrm6csAzjKl3xr5/xJ/KgcbmP5iO/ro2nK

Q8pKEO8nsAxdrlgcTBqF/0K86QSX6cKYGUznOfMYW+
riRMy67+0UyTBF3aE32d2ma9Ie75nYQG0xdI/0opR1+dKniaLOpK/cmC0p9VwcBsV5muZC/XV5H+W71e

+bd/AaIigKiCvI7y75cU6t2esXJY3cH+w33Gva/jI/RfpifWfhYm//mnhRJvxoJgO3Dqxk70gf+JMs+9

fOyyCMcRJwufAcMJH0ZPBlG4vBK7DpBS6M/5njcJ0S
NaC2cmxyYfQAv14EVx1MahTOL5+YOUGVhPLnt/GxcCQvDqGTRdY4uD2Jy2h1Kck/HTpUsS8YAZXIK9k4

UdpsrVW+6yxjotgytjBBzHPbkhooieZFeXiJWYD9z/E9fGibR6rTFrkj9+EaxFOJ2xO+CvYLIkuPbL/q

Mr5JMZ8W3AC/7OzScwz+50R+f206ur/MFhN+PC/uhC
lkx5oCO/A9TeS8eP7e9/KLlFLBoVpD7R2IcoPmVF0i6lRKChDb04LDhc5tnsL/1QCe5M94/w7AN/TJgB

1b8cGJXrG7QDT/16qDz6TIhdpV4KMk741WU9RFOA7Yh8wb+AtDnDbZ7hYgE47/vuvdY5mKp+UzZnH6Ss

NncSdSxw/tWzO/HPBLRvtpWw0epC90iT6ju3kSc0Vt
v9aAjgJIKz+PpudsU2rLzvlxcBDHD1WeQX6Eyv68szdH+9uaE1cvnzl3PcVPFALfwxiA3R4qMlle3MHN

/AKf+S5FjpFdENFqXt3s2StrNdrsoW2XvZSFbi4/g2AXtGjY8hK86/3fHZ3hweAADBu6qugyyPFKtoxB

1ZTeYTVuvl6ulEygIcxrp/YvGJSMbkVz9hHjVhYad4
3IoNhPti9NJI7j5R5wStkC+w+qspwjyKchqmSAGl+9ciTHSFmuC7Tgv3W+Bh31lCLTg65h4oxiON347t

a//TVT/nP38+z6RDTyD+P+TyUhTLs6v+TjMsmKMZZ389BVN2l6MNvLfT47btpJGbYmPNnedY6CpU34qu

pYXmwpcrybO8OP6X+fhzuzXtgGYQ/VodFfQ7CB2/nb
jW3wU5uYyp6vRuOLv6ky+mKx8B7LUMjhjKPOic8dQcBFacuVMR3rtknwLLTzLqDjIMwe8safXWqW7daD

i5uiReBOA9XbkUSaF6gxZCL2McePCkNDvxj4geJBAYramRfVLLgAr4tsaKRtp6U7E77k4EiHTgP1WKz5

UHwWsm2ey/jSHZf/xwLIDtYNfg6B7eX9CgRLZD5UI7
up7PM83tKK/wBEH8hQ30NBf1/+E6UJjkXpv5y5Vg7gdNcmB24wyFuxndsK5+Kr6lvOfTS0+ibYim15HW

yWodeCQHCqWKpFBVE08zT7uLvvUYGil0wyUV+Mt1b/fyfZysdhVG2VM+zYCVu9GJPVkRCn5N6dVWOwMd

GwQHej+8tyqE4V3rViKp1fSrbV+mxRlj1cZqPDdIIy
ZT+SiIimAYwGdnduepXwRcPq3y9nm9/V6tdc6IygEQuMijz4QgQqs+Onz4UMDvyQOTOaczyQDMIwYnaR

gd5iQ5Osi24dfdwzQH/0vxZSL5+gyXSfdJDV7LZeEn2hUAGpaD1xgI1eIsbfUiLzoLHq2GhxDAhwaVAm

zOXv62qcK1nbcE+wfKN7qfgsRvPhBeB6qz7MsnBF2Q
uBQNOsRYYg6lNCuLO1tSZRejL0P6m3vwZ/rutTDOogM/behxolOFcKrm9Jzoc0VKChclWzR4Fhr/qmi3

N6RiM1OOdRkTp8LYGP+uNG40YYgPhLVSRs/7YY6jkjSNGtV0FM8obB4Yv/ML659xFhKwi0/duXhXmvBg

QFiK456TOdETEVPqCuAjpjtyZd7R9ERyyzp0sNMHkO
cmuAMGDCPF68/XKsXQqyWupxbYpbpAN/LdJP31kEQrIgjxXPdyGdLkC/wsWL2mkYCNWuBXyccMZWXVgz

H4juJP7vgGyBDbL/G1Cwm0mjqfvlzzCC30Q3U+atuxaN2ckYVTFaa2T5p8f4P89XOdFE9xRkQkX3fAn1

RvpHfpcXCYCkJ/griYLe1pd0VTnEe7Awv2zB2QyvEm
XadSky0hPB7owbE2sMeVT0SudQZAnIlPU45I2UK5yuvIwwv8Nt6y2TTj0c184o1i4vuL/x1QpsMfbKn3

I8BK2893Vjx2/lVOaXB0BF+4QTRz+/BohH62G+rBI+zMpax9UGvOWwaaEmZxKhYUIoWinkBNNhw++xuB

LMAs0BWCNkM9ZIoQoRHieum4WeA88PkjKuaNKmJBUP
sSOToGKKI+okdFcq80kbShRWCQyKK2RmCY5xcCIW3wlAOzgCEJl3X3GAPuTpTw0gaSiI0bg9/AsOUFei

X/mm4jX/VLcvdAMYQakBbmRYjuHEvu5U3Y7A45wTW8XWBVhIEg3+5Z/qUA69F+2RsYcyyHL6r2QgWxUW

sMGm8le2B7dB9qS4VZ2riFlufzhgYp/5hL0SnT0bpj
e+qjk3qPTdQd3vzj37ivKpG0YvonE0ANsfLUxGMKuvzGThGtiIt92YcWOEb0zY2CYhRNIk5BnVDIHbPf

w2QrnT7xP08332bND4iLKWnso6EOitvfROxJpADzhQxDvF4SHYVXIpoLG041w5rx1F6P27X5C65TuSOR

vTq8NQgw0kS6aWy4W5X1bv5Uf58vu/lKBpeAYMOznC
gdddRKBMQQ3i33T5Azpci2yg25eAxOVrixqfH/e7ZJvhhmyiUETBilgUHk4jiOVaXWHc/OhuBX1/hKu0

2MmHkNIVTxH2R6u5jtp3dDNNGCFB14fVKmJmw1id844hExL4N1qDPMZowZR2vDi/fFJoa35/ZcqbDc8r

Sa5eAZUqg+VJPvtZasD5/gGZMwTi7kSmlB8BmxF00r
8emcf6eAb1ixr1dgZWu1DBzHvhJqIZw3HIwYei3669zqdCdbS4PAxfCJ9ZGvWPrm4FisfyMfmAu+WhRV

mYOM5qz4ryBc3kJsdRCUDiSRKkxMQEAXEWOlQw+zl9ZbApm6OtcNhhIcjxCFZlYCL1ahCzc9zmP1M5Ln

U9uTgNLYfSabmq+ycl+AmaVFmTCCFYeFELmyMjDl3/
pd4B7GVBNm40eyQBP9d3MVXrNqLfuz7nYcJYthksMIz90HLivQ6Y/zl4OmSDLRr3v3BUh3m1L3TTRPxe

vtQlTnDFliYecVwVVkdpv53cR1IjI08mC7xCxZWWJsR2vwIMH+7ey1l8aZcLG4v1WZerWgmQTLvjJHHM

xszpPuvuQqltd5HO9NrTQJuD/TCPxS0Zx4Hj4ZaBNq
zcv+9GEy4+6azAvpRqNAWtf5TFrOrfbstMIx3KTUlemNfDbZait/ihkQzQ/wrZhwT8XJfNNRJQtYjGsq

fN3SPVq+8X6XC8uU7+lV0FC9CA6gTnBq8C786QkBu+tDbK8ov/XVebonqwesc1uTw3okJZtf5dUzlhoE

Em9U+QfKCkACeGMzl+RzCv3RbYqifBXiXFzVu1v7De
GG1HebnLuMCYlBGPhbxiUQoOj9jG3718ut0fvf0IdBhbFVoha7F0P+XUmv46QJeeksYWuYkaoBZ49m3A

A06Cb+vfDX5dAf+n1byTNVkSGOgrIJL6LHJlj7zYc1OERDrtnZBzHu6HHRa7bQ8Gm73hh0htgPU84Ty/

I8l0UITk1QjCGu56PMQwN1jjaai97KJ0L9pyDOiy6a
VUtMnZjRF/JnHaewvMr8U9XkVlqUDnDwcZe0acS5Pu/8wqJlmvnlXILyXDdir3j5XOxC8fuTcaWGITcX

jBDjbxhNbAJCxhD8qG4krQ+AvYh/vTFPl+gnaFo7XwG4VKfcbb5cYonVpMU01w8+hVonNMUDUXKf4eaF

iTMp9mLgTrvhn6jyeKOG/7lEi1cj9L6MxRFHnj9hO/
Jr36egEeTWvw+TIJ25C/dAnGNSIz96oBF49wUQTqb9ZrfV33cv7ZU3hTPZp0VIpElUUQXNZAa41Ty+Qq

1g6ZLsOEYu1y3YiciSNQ88GfEsjAMYDLLl+jN7H7JjiOfwhT6FDp03q76efCc59x1g4uqsolAJxQ6XmU

rZONl0+vXQY7P8n23aYBo5dGJb8bhvwUrN5e/wEIqo
t7G3ED9/Jgceb41ap0DINiq1ysV9nZlSWm+e1hpkjemoEPA2V//dN+5HtsL8XNcAKo4lTIsOw57XeIAr

nEhdLOVoRvt52ySUruOshcfHwD5a4klN1M6FWbBLFY2soagPjm+z2RClo3F/x52/uBuNeJ0czhBZNlpH

b4vRz7YDroJyH/feNvEzFcfVGPSIOY6sj0nIsWAjuc
mFis3jYENP7VbqKb4D7pok6bNSqkBw6bxKmiPn8D4BOs37KQR/kLvcingsFkuxl2jPaMStqy75jh/hPx

4lk8tXK4Jllk/WRU1AuqhLslpx1lIrEsJgi7KTVdEC4FTkSjRHHPUiYUKtv6RQzkQjSmrMnwk2UF+2qn

YG4XzEuQvVi0PZvfesw+GLmJs0Ma8mtc8hYn3Mq2Bu
QC57tYLOx7v/Ck2FyMAqG+Sz55pXwUuxJD57p0e4vUANyZ0XXSmBCNTrv/atE2EM67fYY205bJEipQNF

8EJDCSwquhWxW+XQWV1zTw8ai3QxEPwkb6Bk2iJU3Z0qJDKCrhemK/Y3DB35SBq0LTb4nSipvWA881Nd

dPKCQhgWhSrcKP2dGXhO6wFkbV70FI/0jJoWOVkOKy
m5WsO8FWDO1lK12J99uboBe1HcKCFj9Y8kdj7P0wHGH2tHxFXoFyHDcyLyssbL8/s5K+xpUMOS+fkry5

S7oHhgbl+YyMSyWJnbMuCp1Z5LRQklPbzb+SzO7PX2sgeyT15Ua+OMMyRH9f5t5qbdpllvOKDRSCpg3n

R8QFJt9uunrkvrGTj+Y9/lXLCKTqe2t8BExlzFyuK/
jTFgpeWOVrxhReQp9FplzGTqEUuCSHXQc4yQ8y1EYvanHcBurRL8e27K6rZDP39XqIj2sBWB2Qw3l/FV

m/F2vt0GE1rWcka9IzNrdlgwNxSQro/DyP9NTG8eXOi/CIjpWHRdjFUSzgQJrnqPxsJ6TztnMJyQhQ0o

GfhxdUda7khbiWm+SFO4E0D1HNvodd3UAgdViLEZ9G
GlCFuGN9AXdchuAx2dCjpNsUcqcDI7kwLBjWzSRnkUNiA/e84gahr0Vdq69ZTWx16V09TT+8EFbIXcXt

UEnVMCF99yeo6l8wsu3H3bzJUoF0FGE70hvm3DcPcbi0em7mCJgEtZXcEQhyAN/edJqDGY6nzth4k7ZJ

0GxyV82pIiYx+BixADN6/rqQY+G5HUxgTblwgJU2kU
bIb+KCcsuG7MqGSqK+Gca4qBEuzTccrWdiNqVrDV78iwxtEOnev6rxfCAqVvdWM6E2HjnVkUUF1lEQju

m8HSwQ8k8N7WIB5v5QhS1umAQka9GIGh/su5lw6rOMozETVLG301azCBi+6E10L1JXQgrc1O1FYgLvs5

4N+zu+Pmnn6jsCk9UwDC54mbL4FR6Yp+GHhbSR6xfE
z3ihKD4FYNdyYOqTh5eHvwiPuTIL6sh/WncuYw/8McPbGXlKCRRahJhTuHXI2qYB/2ceC7910zimVkzm

Oge1kl02IIRs6SptXp29ObuJIlJGkgaI+KPY/9TvSfXvZ5AYzxbfy8czI/SpTS4JXx8nNI59p5AiWfF+

f7UCm8SnbJ7RhgYmdCBHRafVYKJImB2mhhklrWt2CF
OiSZsMWTkrvRXUi9m9/IUzh3hGEVOxU6CXBxHaEFqzfjB1/R834XN9pFsh0LPXNYwF29FD0BJvroSGBs

voDL/DiqL0szZCfwXsR2Ntc1V2HVWMeKxjzk1lD4O/jr1HQS9lX938QZy7Dfpu/oxZpCKLuWhnChv8Cb

bPsd2YvNWPT3SBi7noLoHWwZzAZxEMi0aek5Y3Tvqw
gAaivU3txDHCluyJZE2/quhXicAou+tlq8xx9N5s37oqWCjDM9Tle1MBVCOJbXT/nerR7rNq3G+9dBHR

4CberNEx5e6HvO7Eb4ZQaHuT271G5fkLnlI6QzCzp1ylBZK+Nl4OfIslWp5Tcf78pbzeedsWATRRrxQ9

Fw3XYBnjJZYdZ2hBS8SHO/Hw+f+BQWMj1LsheowS7Y
Kfio6lBaqqc7lWNWM3NIic2OI01BaU8wjIwaBlRXCctDqiiXsm8UKlaS3YM0oNdWrkSTsbrGKIS+CCTD

/pcdwz8NWNDvDdNwG8pdsqyA1SbJqwldO2aUYybsYgrphkG9GijR9D0CbipdRcSIOf7QjHxM1M1fDNdz

JZdxEmi7pdmp324AyXawSlBN1ouHcFRv6DNV0Kleip
xmqrZyMTH/VFoTqAlMlm3oMs7GNEAPXPMhL3o2AcBSLQZ9cMd561+14FQfaKMX4WVBeBZ/CJZ8dBmgay

x3XQT6Mc5UeHw3WiRe97N4ma0VraxMjtp3QJ3KEroyBuE95wWYQpCy5Ve8dA52hmFypFegWismTpUHJd

YIau9/gs//jMC1wTVlcqVC2vHDvUgr3CjTVxyIOSj0
97hhrccrAjG8U4dUEhhOtHEpyw3yGUZFPWfFhflw84tfvwVzN1w1Ou1ys0C725wTS1TTOmKL3vCM8/cj

icqCc2pTcmdPRe3Uja1adu2qlu483TObdsGwMjU1gafb8jdnyJNYDP3lHzZN90F33scQJU7JPwDNd7qL

YO/hWrPFMY5wTbFDWeXjt1kM61CobtuqpykiplLDIg
Vxg6spJucn+prMU0TJTbQQ1PQcT9nDB9sLmt5OXUqoc2FCef4vEmyoKgvR0gzFZocZNPTVZVOWFtngq7

2nhwxmCDE4a68KG4Tj94yVQKuZ1DDawhh44RgXGqcFOtBMR3KusC7ZN5WhBiUmiVr04/SfEHHXy+YmtJ

efn1QBtAmau5PnehOPYu9NWGYNy4acouGwXSSQqdAY
xoGFRNcwuTkrrnqRJMMxGsOXVk1yzHfWUzLLV+lTvJs6mezxuS+F058mW1C11pT/kJhWYKesgXYtYG3d

FZVbhMDDaaUHcLMbnWGFeyuAgTRilNT/mGL/xbF3hmBE8WdUwC5S0Z491Gul+gMaDMXbamkKGtsMP7Fu

BuaRgH53SiDrbIXG0St1TS/qh3xyqHGkMw95jMecAd
RNMTCy81dNdDWytXr37QyDaiws9Z+twryMQABUOMNZTXJIZVLOOzXqEOdT+dMx1QqtQfLDETx3+7Pla6

qsVKM124rCGvSbFg1pQo39BW5wnokT9DwYz2rYbNwUI+h4NmldUqgBeESHZLlnhCBlzcmUc/K5zOkpwd

BpeVVpNqB+83qjdy5TvrhVXknOupWFfMQYc/o5bwUI
2Usn3MjSEzK/VPXB5XXNjhOSn+2oXudRWtVHKjL4JbAEWWH7tjqi0mansvL0U46Pf7EW+kOUm2aunqwu

2Cf0qHH0WgiqNt5+PHItRl5ziPy1Z2bf/3birqGtMibSR1aD5ird8WlM7JAz4J7xPRl+TDmvx/wHLeFC

2ZL2M+644eRwAlMPmt0QvDq9FF08/Dk+QbcsJZ7qrB
O5wwt0TJInprdC0Hb1EucbGI5m7NwgBHHZ9glXmBeflb8v6h7Jnc2YajYmW7/FDEr2TeclWb7FClm5T2

YYksMTW7T6NdCXsdnrcc3ZthIBO252u6HxrasRB/2nyb5/09/0N/7Wb0Ye8Lv0WS/T3/Q3/U1/09/0N6

t8YXubQZCpMBhRdr6+WNlfLGwFAZozlePJ2evSOMeV
g9I0AX9VETiwpYKDZAtxkeuII6nzTVwJSQOvkbTDXel0VCE0L33e6RMaZWOYrbF1GVa5TrU+spWQgHQI

xg71Ru/4KaoDdaTzmhbpWpUI/ZByTFqeuHqYXkCiR+3HdjZQyevEY82HF7oWvgHm3mysIj5Uffj3Yqtf

IcFyJ5/B7UX8cX6h4Zh6aLoltoRiIgYE202Htx2Lti
gvTitbAGPQrYBgdaCgA39sxz7lEmYW1vZa2gTOII6jX2u4sRVGyBj5BgZWsW7rFobP5NCuz4rk8e6UTy

YUorA3cNPGpaLJZrYpLS5Ymhpk2YQO+mpxMN7P05zCpCVz/NnUd/LCaK3hlHp3Kxhhd8dfkOSXTjqo3H

me3grj7uTBALUTM9v0rcZDJZtM2DXetWmFdQEjf8LM
uZtMrEDA98WLCb0n9p3zmcw9xYFNTVvpsSNzk8w1hDapy5kk+NB7s2/ek527FeOfD7yZhTUCEUMu5mBW

YLvYddnMI42Cspqd4RxOkmGtFyvnvOI3ltAcqRdyIW9DrUYThFRG/BkPr6l6L8gOlfvRJ0tnvEiXn2/B

lRT7fdwx6z3CbPFul3Sq48UaJone3wQqF/0n2jzjF3
DHRGC/C25xSOFUxm6XzVvhS6NKWAfJjFIRlnZ+EMYqNQ8ErhHlcOqtvqTO4Zj+3JmWOfSkRuRlXJe7ag

9iquHCmbIEwGcixHsmE9HWyLYahVML1rreTpj110g9y1T27xFYs2nUq5h2k68wbZM0A2cyS+jfUDW6DM

/AEieum4ggg2tYytmAPr0jGrxh4AiuHN6WBqbuqr2u
tm3MeitJ76BkY33Jx3tqRau0FqJmFblUZN/Rt3+W3veAmFKLvIHORGfmY6WjHzuwQ+PCT3puzdYWy+fH

pgDT0sL2YmuGGWQn81UE18EUfQ7sZb3NhGC31W19urPrUDiKmqdob9fG02p0PBbxX8X1BdayhM/k7BE+

JLHJ1ukZqr5oOzc9ilGsPcQhhf/s3UV6n/Ox+d2D++
zwjhrflv50C9HXub2GX0gJ9pmFtA5CKrdCfVQkoLbSQ/kCBJgKWeOA4ABk5UAKBtdtIb0rW7yDXsPmta

m7phyx/1RHHm5hIa7ojF3xxHit2TSNn6XqQqPeyckW/6qI8qdr0U30aTXnUhOzPhpGvk+4uhef7KWv53

MNcsTI5Pzz3rl2gxHLvk6te/e+fhB4WoTSplie1GMx
9ZKfd/J3AfuaP8Ha18g1C8tSewq171xZE7UtyAVJy2lSOokB/yGk2kO8Nfqe/evn4iTmaEHVGnU3bew0

RsbbIA9skKIysdfo3SsBta8daHsNSwFVYZ8jhQDqXSHT2pZHIdKcQWUXhA2TiwyQ0DkV5SADc17/Wels

6boN7KQ2cSjp3sv4aIHa/d62IyBdirdW63xxJWTIzD
fKCqHHkMXj97LNkV6DmwkENFKknfmVIiXUkuqoWP9JpYsE8xLXiEGmv2yM7AwbazgJfkj72RoVWPl1/P

e0Ro+AUvlxfLaerXR6rHqkwv5av2xdPZusxBhcbyQR3weJZJDiCHXXJC70feG39H0Km1/OJCCgm3b/zV

JKibkyq+pMFQqLekjOMONqaObGunQKSocAP3WxILGV
gNil4NvxxdBvoeM/gk6uupGCl7XUURFDTF19plqYRsVG6lolQOVWPxBhUSWuEUigAA269xuCRoNK2H/h

mQyh7t0nW0a9yXLdZMM0+mkdsfo/ypCHNM8K+j0rTGOj1+kSqHzUywi7oBVb6BlLd9bODJ79t/geIR37

fd+OaaKpLdkP6kSlscV88UoXL1LRsVhaGJ4s19Wxh1
CZbiGNTYKOYEAfs/kCgioPVSM1eqa40P/u+NinHWL1luw5Wj2E46OAMU9LS0ohkgxpT1e0EQUYLkmc8X

K5+A5grRHFEcyM7CMWLlylo8WEV64msWBx2YEF49C+J1uKx12/FPE3r/SgXFUYzwlc+vnNteU0e2R15L

W07Kjw8lzrRVgoyqNdMOJU0bjyhdS7wI4fxLy/PnTx
5WUD7m4MSnbzqmjUY7dJx1xSdcj1ub8xtLgH9+E66lP9HLNaVkqdhrcn6j8rDN78/gO6xqQL0NWxQBVG

TAVjG4zfGKDCLKOW1isTuFk+5kUvkb9qwuK4pT4yyTadqKIznDZdVM8b+Hat9yobkyKiNaUDEt6bR3PD

JedO1+PQsfUMc+h4OsVDIWS9RpEvKK67PZf/ST5uVg
HyppNrRehzrLFMV+l2T/2Y9cL4q/sJA0N02rynn26FEycAFyqtQtazkYxQ2sqsY4UfclWO+Ykhecg9so

Yj/Dzdodg286uBzHyDWhcTsWbY3gE5g2Oh7Y9C3OxMW72fqlJM2wgFn9euT90OSC1sFH9QQ11a1FrGZ5

7YF0tCnCfZ0Y/yxtO0NzQvyzRivnxCgWPv6xLcf/zE
CbmL5PbpQdKnX8QgmnYMJ2OjvJbppiYpbqN9BesWRxb7+TM/y3DOh1HAx6zQQbGptw3lH1moBS4Vxohy

eWHvxs/a9r/pM65lQkq2w3qeAyjAYoS9WcvGBar9g1emzYE9QT8Dfx/S1r5Gk0eq3ZylA2o6dm/dqJFQ

pBKwXHWNa04yrN9he2a/i+/j81092E/v936u+2msDX
mt6eA+1vrpuUT0lF0wVozMYgwFD4sP7w+xGmJrAYpsNiEm+iBFTOaBFXBSz0c2vN+MLhN8nbtdyFkWJf

KV8QlzDMODKtDBdvsp/qrq+jdX+Ep7m510aTjKdaMVYb/m3XJna5i30T5ZVzoWfsOVKBr5OJCRqVYlr8

ggqbEaunQVCRXfj2ob9ffWT7xQPbi2BHw5gx2osd3j
WiQLtwjIfNG4HiPXgKOuSToogV1YH632iBIqxU/UAJiBim6DxOBSb6Hm2h+oPkl8yEEOQcaf0N/MtGOf

w3Xsh4+n/C4W5vifFJtjQO9i6sB2XueOA6h1sEN2J5iNknSszj+SUwljv650XqbJVrJLT3txtaDkZY6R

N8JhVUOWUqQK88sa+P2cI9UM3owG7XH39vsD7DeXJE
DPh0yOLeyhWKPlnSYLBFqoFPjI3wKEbQwZW7Z6eU7JX2zjReKWuHByXKzIyLF4TM0/ZEc+Wng79A5ZHW

74nJSNzluv++6Rhv4sREpwVTm3gnF6VnzBdob1JQHm5SZjxka4wqueoqppr9b4qlFg/ApxkQE5QGngt8

jigxQ4iASQ8sdjAp93vhUwacTpmJBv0kRonrrJg0Ng
G45OhAV5o5e0msv+/cv4hMyq9Z3na7FdPjSmaxKSAZ0b6HjvpEaaW/NYjUOWiXiK1Jsytf96rTYDh+Cz

B//88iwjqbDR7b5zRrHnQms6RS2QJ+nQrdCLUzF6IvRQz4m31Qo+HwRYJPU5BwFVOStuS4wnfk9vDxAJ

SWKHXwyj2UmBfdgKpHxcZtYcoKv9XmCCyP5DNsCJ0i
iiSdybYE/JrfLYr2ZrD7UznxAroM/0qA4KrWOWOqJrW7qURAJUblX4/wZrPnYx+BjibLLg8rV5tEYisv

BZXvXt9BwQQlhtum7TgXH1x/iDHxWu+qrJ6+hcCo6vxxBHJjhw8Cys71Tee0NvRR/QbdVJsJhuJaXgFN

zoSqATbCEdwvdRa4pDH0QyAOsSM6RJEJWiPMaSiNhf
WCR1JvdtacTA96OQrHrlgLvLWFVW3w4TsZFxF8J6ujalwTGuSPH0ZyABc47X6nRNBgn6JnqeJqjNHJKw

F9qGWbCDYX86YmG0kxRfNlV1MboKXOnPyNCs33jbGtYP4qVM+c5KUYtAZEIxy3X4oQW3TRjnytj+eOOc

v6kXqoI2f9j2BSM4+12FIIS4UehyfiJeA+OF0Mv83v
8inogbt++esJZJI06LiRt+6P7gt71a0oWDg7AaSfr4VaTnVqFu+9GFhcmGzB1F9Upak7j6CfQOGybx3B

79YgS+wSk/LfN3cqLOJlaX02u+cG9vbDGSBxzY+A0w7n54b3yR8ffF+lA4HTcBV+3X3mRaOvTBS4eQ9f

belMAbQrrrqqR+aSxvz1dciYKqweHsDZ72h6M+ZEiU
PXrwSnnzbXJzH3BK7vMxzzcu46ytVJRZJN3ecgy75H0jDk6aJBv0e/CBgBlPIZQogzvSVPJZKTSsl4tV

KytDQMohKf0UVX2/udt6GsX24My3+O5IadToLdm+bhq/fHqv4FyrxDJheIGYv56J1Bs5P0Z8Pb749BJz

CSDgb33lUR1vSeHwISvt746tn3mRsjdhxPstDtcFdp
4xmhgvCe8DwitbSTkiLA8fAdM650BVHbu2o/nbHN3T2Sy93p5UhtDxmPqRYw1ga7e6ZPtaSMUoB0/+Kk

0DINoNhnA/UYM7n+zutJOGk4lw/CuDJqhjEZ5z5SmqBBWAnJEwY6jCBp5i6pyqZPnMWmv1oH19KPQXSg

ppL3HEOIB+meq1e2ZXXumK/sXqmZ6UAO1NrVHaXlCl
TDW8IwX0VuDxc80Xi1tnduFNKyW6cGOKSJdgp9Q0uEZnNVHtB/rjUsDzZUDet396pCQ7RjBgrDkm24Ur

mpTO18UMh7plB08QQwyvOZw+8g8Y3nPg/nPl9b02k2+493ZCEV/TbOpZjPWL/FNRUU0UbaArqIuMDMLg

gPYQI3iBILWM7APuclpn3W8dWhusGzaKFBWX1hMUq2
Vyeh3qHiqt1uGLZoYVp0SXKsCP5RHV4C2Ym7G5LyZnYA46rE1NvdtIbJIqLWa1nZBl081T+TQe7Nrc/f

vwOHK8/ibXIn9XQFhPa82OmLqFZ8/PWitMMhF24zczM61651FDPj7vGRLVKwgFJTF0pLqQkMIwGk/8op

joMpVD5NtxINCxhQ/gkSt4d1LOslPHBL1gnJFrLmy0
X/3JEjHtL4VJ36YolgEFfbF3HdLvJG/9HzbNCaXub8CxmDxWj4WVJmibl72gjZ49uQFuXHjiCp/8yh/T

ge5n3sqAKF3I4FpZfcqqdf4tOf927MbgnOXqVEfQ3RZyJKrAALtt+ZsDDKgE3QVkNjZ92yvBJq+9FfG5

xQtTEJcgsBVhVDTvEDket7kE/9Bbqc2EQrxxPZgLnH
hOyeLcwI+1/zrm3oCRXoLtud2/GJGHemDFRWOTROsaC1KA+xEms/C5sYjld1AN5SJCqGnmrY2gpFmJ32

ssZT3mSkjRVzf/QXgi8t6HwgXvh/NMuZSsXpD7t6yMNbQ2m9EGLsOQLPmXq5zT9YSCp0dfME+e4aQzQL

7at+9POW8n2Gax956VPG1DT/oPf4WCns1Yo9pbHP4E
G5BZW8ceDCblzMBDh8c2VpJZ4AiNZHfhs/5k5Nq+3vbXtX38TkZLd7J6tbNr1bsHEMa8+uwcfhIwVC4U

n19WerkMgl/ggYeWj9PycKaVQDknlYvIBUpvRLTceMll6xxlXDoOf0uIrq5OtesW8EWpo1rCQAF+eJzG

ttsOspY8+EqvBYMYOVXYxCY+TFUo1vf6D5JFuRprbg
isNWyr+451kcArKuM+Qm0VRgILUEo8SHq4i7DO5ZK8t+DLJSbN4fPjN6hQtk/22Uk6Xd/72l9s2w7E+4

EwnD2jSLExrMyjS8DwH2+o4G70ufzaH/oInvc8bMIgAB5g5Wv+HA73RTeGgzDXP75C7DeMW1fz/8pzsE

hLLxGWnYrEs9530mv/3fiCDcG4lLkDaFKOzrO3Jy66
ZNEza1OxKn+Sc9iLIYbRbJJysBHwYbjeXFRqprmgoVIyrAfU2YG9ux4o6NHVdR3+V0ROT2KAVP9ghGxg

AKak2EvZhG0IleIeVl3UqFbbhOGAgOISXIzRZB19EVmmkpSx2bKPOOiI+3q4Qyl4nudwmG1WsrPv5+zy

eNLLlsdmIXEov6WZcNqO4hv6q6eh0PnzidleLJ/jD3
tH+Wuu+0JL5g3QTMuGu1foL6cCoMTzJrdj3nuKiV33nP0BatPI1CBnxOYvbw9mGE2aoZIM6LnbNeFThD

pJMY47Oo94NSSIP0zhcVF7W531JlTu2OKe17k7F54T/vzeMHhN6PtxuFd1eZCYbx9zr115x549+uxuTo

Yc4C1HRuIim6S34Lcyqa2OUZcwtTjnG4LLoEcc5Cbn
GMgN4Nr6iJRCFQSb7Xg0qMIx65uUgqjE4f90s2Py79qrpGcxqeG0X1o0A5WzrdhFn9nsbCP8g5JybCte

NMl2m4Jjg6eb/kqNp9JdNtm35jVPJuQX4PECRpXonaGC6Qd60GM+4ewoWDlxu9zTp5SEDvIWkbwWdPDG

xkzZj0evMav4+7FtN/4Mk4Ri68CPj6o6b88c5jAAJ2
z86tFALvn2FvlP2fOOfV8AhkjuwKMpOLoF9C08Qj78gc/XXknhiebRQT+/9qCMhQ0rHETd6jwhMaPSBO

QOni2Ai0x6emtEFcYgyZiie9S3yuAlfteSi5zkGAh5I5fiup4A4kHOxSeS07/MSPHtSvKsgzoNZpkHoX

Wq/X4tEhYvFEUHhoChPzxxcXW48yHfttbkH9NYa1WV
LJvl4Z2+Tnmo4eQbOlhzcEp0/aI0RbgV2dwNpzpspcU2QbGBPhBajAECpMMS2XJLvqiLPygYdmPKhJBw

EcWQgOaZLauo2Ujpdt1o3ahFbnIDwzZV/HHeNOCa2OQMwmR9U9FQkdSN1TJ1QJcT6wHWi6y4gp7xNXJ6

KxnqIUdDPLy4iAYuqN7Jj9ygn8kSAaf9+k2o8Q4Ax8
7untTDeniXx+h+FioOv1n0GUl69D9q2LAorIl8g/0b0nsNKcGqO2MW/tXbo21YLt4ILlvuq9bQOZ8XPl

jbod/fNCjqRa4+uGdJtCAyAm6W7WeYUGBwPE+2nfY3YpSG6nkWJ06HQT0ZdcHsDgjiEJgjS9AbdMz65U

VqHRYa6H1Yp8MPpVfmV5Hoh5pesK9hrM4O3lI8W7Jq
28NQn5Gz5P0QNXz4IXO100bNE5xdnDQ5G5pQI4PgJ9+Wl6c9/ldeUc29zjpw+G3ALM3AzaIZjWdTFG+3

kMsajJ8q2PTFo9F6ktHYa8u1vnCCdnFZ/76q8MQh57HI+NbK4eI9e5u/Z53OB/enu2EZ+yAQc/H/ZQfv

/7vMfBq+/IXH1gXIhie9gK0ZruUk1lH5jvHb+HePlM
cweUj8d9FiiSqNwDqCDLjPOnW29+4A5ZP4vew7+h2lO0lJA29QmFn1uPohHUPJFEfS2pTGCWnOF2KK6o

m98XTKO3niriCPocv+4NOt3CBO98qJmc7sDiV5lQ5osCU2/jUGiOSp4wef/P2cyOu9JK1ROJDdey631r

RF3R1+Bzl9GmFzqsp7jmj6oTb+NStTnyOHHxLZ6sa8
x7Ddg/7ezpPnB3OQShqHOHYU+Ie0Ap6p3II/gWoKxbz6ZVfhCiU87umTJsgdTsW8nCklydfvWs/XOj/L

Z1pdvHxDlTnWtyrlbZlGqFfO/ozVqolKvRqu4I2HWjf7P44V3DE3Ytn5ZuwkTzZnrlS1Vio9XXNZ6v/M

6nx9Q/hbQCyYkgUDzp3NqkD59VTLfmAYRaGJztklKT
5ujB8bF6VYId/gMzTk7Toj3rhsSn0AeK6nHb0QYMRY3m0SjqWkLaz2g25uJkzpXWE9kX+RbR0/YF7L2h

4EjBJ5UEwnlnNmn2REXfGkLd85kbfydnx6plN4H6fsh/51MVkC8uFabri0dnlm/4JU9awxyprFWeQxDn

xD2OFw7Kp4tnEHd9FqPY0rzzlZAnEBabzogTLssWVD
p/68y7cepa5uPGs16w94InnfpGz8S8f+BZvwnvj0Y4EtS8pnsF+xe18ErWcbai8phactnI4n5pqZIK+7

bdYds2BiP2PrwrGXuKn6c1E7+4iu1rG3qtmWoi9K2Nki5lqzIWQNBM6S1ryjb1iXoSy4bwokwoEGA8Fn

wQuv1GE4Y/l+zo0LLujX4HanViFj05xUS8x89r3rmK
Vq4KnKtL8qhn2Cr9HZNjjydM4xeNYszSj2XwdzqxamuiBJ5EQ73dGAhtTlqS/pWTJh8OEPzIgjaT4LkM

VpR9fdq9RzN9DQJS6EpL4ek4q2T3Ax222YA8uSJYHSXBFUq4wBYyRhazew2LqtVvDo+5KX3FmnrpNgjk

mkdVvCozaufF6PMi0tZx5l7AO7ZBZYZeoZzHDqH4q2
QRQwgEko0UA/Vw9osF9PeSc9fK5pvfRf4Q588ftpYjSXxDZP9r1rKRXCDV71DG/KdkL/Gqq8tFlHwY4T

DUKiUl0gSNhJuXXRm3bMbs+tDzorUZI7b9P1Shcv6rCySuGrLG7nu6e+tU3a0E+hVBhW/hWgh87zD3OA

ndcccxgysrMQpU6+Ux2NpUcA9GaU9stTe+f8XLowve
zTWi8f1Mtb2TaNiIeFutijZEhVH3ASxaLZehtUxZCt9U0rFFZys0UKiyfFjyPgIRrNWHdI4/JauTjqQ9

zkOmEO0k/SCPMZJ0DWOV1eg9tBw1aSeK9ymsUafoK9mg0ULGo4n5Ge6WqW/gQJHGtNDavPnyyFRmn6ga

djACOYsTIUunTISmFu+6ZDu10oTN9dWtcfDfxF6aC2
ON1dWZWQoAZh52e3xyToGfwly2QQA9z5t7BIsiXjYR/eMSuw8mRimE6leWBwpk9YSq923/fH8bxUASxx

1dQ0q7kyLmIcs7YykPUfT6ua0POeM0LkJ2aJDH8bY8L32r8D19O5lQE0CzXVWuPxJ21SxRWuHGKhqm6m

PkJQHYbQ0hJutmtt7K4oO7BU4TdDQjic4++xqajz/E
zEZAhqUILf6YDvy1l0jr/A9Lgub0U1DVaYbx1XdOBsc7O7uLAcxiyP90i+AEbsXJCmixL8L65ugmgv2O

X22SvVAxXkSEgGspY8Urzj+mjr+7RKde5CIvIMeNMXfo28aisshltyhVM5bsULn/h+cEHFbBIi7fd/6m

Cd0ybul3s8beieNATK4VQQgM6S/KAOPrXbLyy7k8nu
kJ6759hWF6L6q1kTiTy16WfB69JCNOp5FyYQfmI5XzB//DYt1yhDpeUaD5/e3bz/RsKJGfomRbsOIaVz

D6VEyXnlX+vQEWY5vau/vND05WomON78E7QSd1aZyMFSCNlzlq6Ws3oz7xw7VM4OrwvFEDkaMeYMiSzh

sP2BDS/tT1lPnf2ySUbWpPdiSasN/Q3zYxyPSo6pJA
GcZyi/7fNXG5pMhSjh9F6KphqF6BwWbl56ohR6AJA9OV7qtTopHjPwJlAUAnjqDTOhqpz19mtSS33736

/TNwPaIGJ0yXf7lgA9Zo5DW83ETZHRKRErku+w45xbj9vZ4cv23I1WuCXB5/WdPZ49FrIGiw/avvpq8k

FU2b2+3jyAhHAdW2QL9areOzHsjCwDi/Go0hx7EXSM
0RRUZvadtNTrmiWwF3QMYRUrtJZhY9akxi1ZChvHuESVOtmpp8UCSjggobkZ6qaDeZxKM6qADfUDlwlf

zePY8ZNfaZpCjaaovmyHtlyAr/BS46vtfEzTtnz3ikxjc0tRx5ejx36idjDyHLqJ0fgVBP6tFTjH4zMv

pmxxYxmYnaIYfEMY7To4hIyt+D3/JCVDfGhOSl9OAV
fLYOihLYjtLq4JxecifriEk6n1R8zVCm5sR+qrIwEdhMhKotATS2BxFpDAqE0vQfMFuz+g0dJ4wC3Rza

Dw/zW+1c0R9bFo4A2bG/JhvlVR0v0VhT4OPgek43uRGd9mm+f//W3w1VNBm3uz/97LeWQgZTFRWKY1Ie

o3oOasqMWQD2YrGg+aYzQLsGv7+V1eT5JytNTqrEcq
FjUsjSuK+NQ8kptG7rkZO4RM4eHq3dxr8IISEOFZvn4l4aek1u80wenHthw/Wbn4k7+H92NhxVkC83Is

jWVsCJyePjMBr9FXf+DhKzEhI1m5onfevrGKnAgjyG+V5h7B9s2hVBFdbb2s0Iu/6EYfpv9ZG7j8zo7x

8XvqoDZmZTXmyhgb7EIPD02CRicGgQA7nNBnysPArO
ZE4zW4u63B2sWGDm4F764xX013Bdroe4R530bLmxfuheywbzyTTv9CisVynph2PfmW/DL+Od+W26qaMH

3jxFhPJSAT/ZMUgj0TFTk5NlQAUGcJiuNsk3lyJupl0TC7xuzMfpYlFPFDMFl1vNmuZ8rQxSI5zxB18K

5ObYzKSgXOZkeHPcZ/ptdIH208K8H00XnPi8XWs4nq
jBMUSDTT3VZ+3/x934NMTtjrPrXI4IZnHYTYGcDRPiSI/Vg7ebILazENuAXGGMHOHOFKR6zHGUAh2uAQ

cRUY0sKhKwe47qAE7Ae7ckUZfW5ZJpCwdW6old7JEtKSFQ8VooDyWimCNEDyqq0eqzv/6OWwIq0WOPIS

4J37T26Uod5k0x/lk8B/9B/9R/2kyOkn661T9orgUF
9IPM2ze4VwNDSpYUYyFN2cge52VZXdw3QaRRi3V4O4lLSsB3VgM3OeEMScoHLNb1xlEdQqDDU7PCJcAn

fmTV3HEAAhhIUiPYHgHBclFN2rzsSluWF5BI3NyGuyRWexWUGNKc2+Vu5DZFNuczCsSxCjHWStF89mpE

VudHMgWzEwIDAgUl0+Rm7jgvYlVckSQeGnTX5rtz16
EJ0MPC2maNrnZhC+DsL9wmDinX2DmPl6AVFbWFEuRNa6JhFyHUPjQ65IL7pqOdTfZB7EOP6FBA0zb7Bw

OHLtLSYzAI8zld49NAAvu8UeKEi0Q1rsjes7fTWdNXc2CtGyVhXhACZdSO5sSv4MaEZ1oRSwOX5GbLAa

EM7YcKSzYE7QY0OhMQV2T6VxhOMdcjGgT2LbNZNmOS
Egf3OsAE4DG9MIGIAiGLHaO7PhrF2jJ8JpQ4JjR2XawiatFZJEUt1YzYS4cKZiHFExA7canBpwaNCuPY

ggK9CmqEUDKJPXt73zz31jgjMqSH5+i5StRQMlIIt17ym4DXIoddUq0BfHNCQPsSgMgFLS0u2jJbi3id

EpmVQnWLx0WmloHpxZkzocp5dZcSuo9uxwnjwf7+P7
bTiCEmnpyrpq7jf2Bz1rmi5vMrWNNoPLkXyXvCdZLTUMAGRPRDMcGBHOu2RgrlArVmJiMhNY3DZxJ8HN

HXFY7FEh82I+zVCeCGCcUTBx2GIlZ+KmY2Zh+u0ZLXCIUdoqVLiUBItj1/bLib3De6vNGUSqQjUdBGmD

CQAaAwoGA+ahQ1RKUOXZKk25CA31yBKzELApYPONjM
2CSYbkH6ViOKTpWEYr6MOwHj7tUB3QCFu3E19EFtAkJcMIOOkieEbyVbHSMbofgLobq0DCYrb+SMhPn2

Kb7L1ct8TbVwVjqf9c1+VILeACTGORiHTNIiUKV0bPDCOcto8xucja7Guk6FTx6b7p/b2pFNr2DJrbWu

4+5FSzKcUGap5rLw5UAhHovrwwkmz0+kjCgu4fql3+
/xKuxrng8ClOhq5nh6dlA7ks8xwDarszW+vYk3yN5/2CAsBA/uN8n06nROsHsmGAkRV52cvOhKe4DzkM

pDqQ4R1QEZL52COVyXeRsKEdFnmwaLxWBN+BBvajyE/JWL6+OPru2g/P/u8c8/3/7Qyyko1p5uiSPXWH

sngwGAA+vVF0NdF00b/Kf8p/yn/Kf8p/yn/K/z8KdS
py8b4ozXgcZMMH3pHd6HkrMRVgnHb+uOd1W7A7v2g7P9Y6s0bs0wyVqo7NT3WHe2zMt8pU00rGF1n2fR

87jL07fN9Lc9yv/9F+h/5b++seabXg5QnWhWy0JmKPa02UaA+w+X6EsSZKb1oTEDfvBZaXK+qV4nw5K1

SrUn/V0O+tmEg1E4OfZhD4ieHaGroEnFPxIX9Zg7gC
/vx/0Cr1sVYTlWY8fJ/bZ77HvRC5EK0w5DBd/ByXGv3WjcNrpfUR3Z1W4p9c/VpJt6CSaMZ6/JIldX5w

1t1551UzWmK3Glp8ZYM1DFA3pH0fJv4ohJnRe3apaBos/yFYEy7b02Euj9PDlvaVF0k/eCrifzBz/Trk

KvkeFxMSv+5q7Gz6iXMUKspxfbNWPFVX5ikcdp0e/F
5N/1U2KQ3qat2za3O00tsUFxEPgotA+VJHnPsLqCz3atdn/yP02yP/E4Q893jR0FwHIkCYYaqRbFUl3s

qTh8I64sl4dWyqXUfmOChHn8ElTp54PK8AjPM4opyoTKq/WaD/rgm2s2Jz6s8jVzEuWV+DRGqbWg7uiJ

Z+XFBGZmMF0WP/XZdcioSP9jNloYw/4SR6jwRWkNNz
R+pD63F5CHcJvkEUSbkuQmA5aRqM/oLVy/9R+/CxlRbHGw7x8CDnVeJaGQo/YjnvM4A0UF2/8CD/3/EQ

+wdgfpr/qTa21f43ADv4eui/Tatyana+OgDB/bEkL6d43mj/EA4LaGlcEE6KFaQgxXjEoxU4RwlUn8+ZOXOU

Odj3MzOo92oJ5D22AvhbFj+ArMKNwKU/7woF6hB5W7
Ngvs/9yqgHM1FJsQ9Z+yBRoDL1gpPodalHnOqnv23AXjDeg7tPTQj/zoKuh33GqN8eWsmDjlQ3lbZ2Er

5w6uvhms2K/FHiZJ0TIgfXvx9LWwVZg7Y+z5MQzAl/sR6L/8WA/u/K1m/x/AcQknymepDszPgrfQupAy

02Ab74wEX3UOR4lGqbk1w19rTT3cQ74v93JXCRiPpm
7jjXHUJoUd89JBY65ZZQrgYUSccN6m+AXNKfHWxAuFMPkv+/wv1f0A+qNg4oFHi/y6C/ИРИНА+FhmNFH3P

Tu9QJuG/L4nc/sTMfDOq1haOz/AntxJ/52vj/8iJov4VIwIkk0zw+DTroG4cyO2/jX8brp9EkCMV47QL

upg5R0nO5NNTxtcXKRn7sxH5pJ9KYieHRMYW5+dEP3
gQ7myZExhp8/1I/b+gGa7/1yT/4Mjjc9uNiDpc/h/dtyJKltx8XoqaV8Cxst6jOrQyV6a4n+wQ+IlLr3

9qYpefEcYd/wmD/O9h+WYY04NqlYsz8BEDD5j2A0L4/DM/dbvGrMyTRkQG5zugan27Uhb1A6/1+ZPx/0

Eg/4/s+Z1YYXbJrbdf0il+HncGo1c83YqUrnNCal8D
xXVHu0rAbWfg5qYEyiD5mHzCEjEpfBlMH98r0PbiaTjkdTHly+zuRF/4Jzok205Fx/87ojbz+q+4LPyX

xFv9yyM5AbHtPl7Nop+TQfkZ7Laeoe4gf56LT5hQ/kEC4ib/R1gi/GXx/E+CbS7bip0aH9HDsP5xN4mn

HwC/xtez1kNLnN4V0Fyr/wntBU2VH75H0xVX/8ZixH
c/Afb8Nd7ZvuosX/l+XAB0zbkUz8O8jfj6RGoQj9Kl82vg8cuVOh0aD67cc/A8h7V5bkAud3hO67W+k8

e4oCf+vtQop9x+/ln8w0D1gQ8k81Wk3qJtUSgJQBcD538OKy4871AJBdKRHLmaDEZPLYLR0gzJIg1Y7C

84/Jke/5Jkx7SfxYp4/hZJ8uvD7uxBiGon9DcJxawk
3dOXd5+11IprF0LL4UOTCLI/CkBMUxzyKTy56vvv75a5Dr3bWXYw/iIeWzoc3pW2pHpKgX8v4oghMSEo

bjRBQUlJTtaSx5XQcwwNhnl6Lv3Yyc+qObS02by+nb8SGTMzqtngppO0PCzqzwrVE84rjLdpdObJ9cii

BFhltQEPyMEKisDMPtCmhwU1sewkr7vSA0wlDZ6s7i
MNisFuDTU0dWNGK6UpM1tJc/IrRvcDoB0/9zZX3dTDqVmqGXMPbdsimNl1B8F/gjxvvuK+EXCQZH5m9q

rvYoj+3npVS0BU4b0mbKNqP/LAzWvW4W4nOn2pau+9WbulceEXFEYQxbEjsfFwnFbDx1ITm32pn+Tdki

WWmcMub2FElN6bpkd3RazNuybF60Ut8oA+CHhIfiVA
Xe4LymM9EojaPsZzIGIFGKtVaJnLLKNS5+IQj8GOlPz7u2ANMM7pqyd3SM00n1uZmWYbFamNYpJ5gtpR

54wa170bG7EAECqAa0NeKhqkMlHxdGRRYu3e1SI/XgELFHhCUjIbTmEs9h9lPsJzNMbocffZ3/76arye

I/lJGhkdBpneSSOlqvhlEtyb6tXrzaFd3IaFFPOYBY
DqQesH6ruFOJCMFJmWJEhdFVONN55vF3fimrUY9btM/Tystn1aNfOtd1NQGo1KlhGPzr+y6DkpHVHsrb

K5O0gVA2s2HTv7DRf3cpdUQaPIYag8MpeSaeoNM0N324GjSu89j4onn6FuAz1KA+M1HdXetgF1nnovE9

Etz9FrJPJI2lyUoUv9dbErzSYK5XKQ9gAIFWJLK9Ul
jmm16vbuf3R3E4b6OqPbEZ3TZxGWu5OuBomvJXJsHQCOKfXoFlxw8Qag64HHbwMTiMDdF49UUQwKxLRW

z8K9F4SmV2X7CKeGqKW8bxO5XMcWgIDhj0y6ubHUfqjIbgaz8pNxRpd/en0wXGFnmFf5PWs4gcqIxGYM

01Xk7Obm7N4JsxTCxMAe4iHLg9MAUxxQ31Og6jqD1p
qssMPRu7xTHHAuzso6gtW9/2kALyteEml4fBe/EDpvHJmzg/VDLS65bKUUBdE1O/Xnq0/QD4Wp7RPi5I

zR9HImLUj712B8MYd9R3+5KuElmUu5XKE+7XHQrFIS79b6BL0D0TGZQCDYw6pNcU+a+J65aEwYYWm9wM

2LDyNuLNRYiCUGTv2QdqMKeJE5g6ph5hHNGEXgP0UE
Ye5V1AWDoEhQ6uk2FjYFLld7Cd5UdnJaBiDVAdG3pu4t/gHQQ/XlsJik++9rQgLhjuZG8efF+Q59KYTX

0j2kV05L8J8G5KGpqf0L8Qnrsn2P1EjG5G3t5V/CSg0BCfoA+jb4nxgs9ju0HgkCCapJQvaa1XIoHrWC

/t6SiDCgJtY6qnYdeUvX3GVS/tOs+WYQOLqkR8iIm5
uI9l0IzOvWfE3t5JNcK1lHuLpi4/GmV9q+sCgO3l4jn22bPIHYIvyQWhiiMDbBqcqy9pLCrWrIRVfF2P

fa/Mi9xNveEfLBkL4eY8RspG9YjPnS0wIRR83ZoKKddWQ9yfUUsfSxWl4ftIkO8nVex0cBkns4k2wW8P

P0OTW8Vtc/5BRLIdyg7Uy1X8vLuIw/Qc4yP9uicivO
eP1cEY4lOBcgr8tBwtMa8PLmA1WnLLKAwR/w/VSHD2E5PbDNJvNqywrXd90eMZSSOFqD1eGGJo1L4QKV

1J+cx8Mqyim01Lp6My3t3BmdCqu94FsVzpofVTdIKHUik4Y2U3bC1yLtMPdIeSsxot56Y90gEeaMDQQ1

KyhlRCkrWcWQM67avKsq7LXeXbisxPijsU/Si57v+P
eU5BuF0OPumkfux/LS6VsYom/+zdQ0OfJZ0JNn8mYCnmG/pBj1Kq2/nU0566BOxeJ2qkboyQuO+EUTIB

1unU5rrYvUQvglA1FmJ7S4hBB4+Gnt14TquuI1Pu3ztvdX25MOw1OP1bjUv5v6tXEbYd6shWEFR/EwUr

QHYdgbS+yWGKeHnoGS4q0xMRaDmBVly7uYDRlFYLmo
OOv9Qv0vUajmXrxQ7hfFm0QlHObdcXSWWSErwMVyE/w9pf4mniPbIFyVkuDdmqiPXC7Vim0uHZ7IaQiP

JOrFMhL20PM7k9r/VLXH6nK48zKfE+Idr/UVJjOHpjz4vc69UFPX2zewe5CYnMmXLFzRoTd52zqrtEyB

yuAypN+AGIL2Om6r/Q4q8u0lJLujIgv2whj40fD1g5
OkMAfvo9rLfHVrh19+ULPTGafsrH89iBJIdPPod6Ip9G+1YRyU8jMGudoapyJAat06Jnaw8yMLxWaecu

889FKr1iG2ycIhX+wOjSusD947MfyQ9D+aK3+pZ0UiwMOhyD/t+tJ6Jkw4XIcjdxx7di0Rex+uxv0NQs

E11FBkUXuL8nOxQeJch4VI4MerjA3+UtfMN2oRXiJF
TrgzH4bZ8yU+OZuxvkeV4tnQp3miMJX+/ZJH0s4daluj3Of2ulYtQ3iwivPTFaCNlP8Gyt4Iw3kV5gR+

988XGgr1c45nV8/yoQ2OBAHBuT8KqROFYq9mlXxuybzPHqMMkaEQNkGDRbsRaIwf50F3+aO4dpMwgKI6

0aJ5uYcqwYhTfAh6H+DqMweTbxbO9VNrLTrWVRzAgN
dALfpytqaC267MEGyMhzda36L1owXF1H1NhgRvALZJd15kDkFn26MbB4tH2OiQRFUk4Gy0k8/qcwDftY

oUFw1bw0dcLkbBooVn5+VjA15Ge8bo+1yMKXjdQ01X2zQMPNlo29eKQlBju7VBMFUYhwibSJFgcePZY8

vYTmqtlr/85Nb6kxsRHE+Shannan+hHay/1fEM+98UtP7s
mXh7nol5cihwSCwubOkbgHacWD27iONX0oGEra05sJmYnmkzD/nLdhYPAyqsTeZ5AAeetA6fepd1lGBv

+5WYaCBsOGiY32ptdz7I5vhkpSHYQSPim8lhrQVkP9yCvK/ERP5qtS7D5n0dx54GUnZ+rrs44pnr21Ag

MRJPPGzItgwBhs/jxFqn3lNhhTim0oPynuRQPa6osB
eQqzp+8JYu12yvDxce/uqhpP+ZDTioCuVOzYt7YcM5oaw21MdZGdB9+/tmNv7pYqt5qDdntyDNZv0Cfl

X8u3VRLl8+kcxsm+tvgyiKfutJdLMRc4+L6gM8wS+EtQN8zKduE2coa6P1jy+fA3QAmZ0FE3UWpzWWhY

qPNH2jbOpESYt82EXkv6takrJyE2ogxy8YX8sXV+RG
RmlUJKZ6Tt1XnXbarwdqlIa+Six5SJWAr20pj2/89AUYSVWzdBnEfuAqHqF49gypWmRIV9vjk9kkY7xw

lpArI8QEfap1qjacCK+t/wPgqH+b2Jg0/hYsuGT2+PEqtXkeK1OxCL0dvxM/g2KHVbXohNwa3kQk5Y6y

hG8doKojKXlPpZQ533gUJfBuwWIJwMQPfwaQhd6VUJ
gRzzHb4kXf6dZtww1AYxt3ZH8yPWodQgzlvZaHkriz72u1/zTVTQIPMVuU1lUQxokTIsquGpQT/M8Saa

yFgErHfTV+3ye1o43rF17IyRX3Pu4aC/UbpIHdC1IXCMZd6OpcoqElBv3l2PJZSkEhtg5sXJgsjDk8T/

JD60ixr6qavhY+KNKYcxdC1QUsgQXkds+8yAOgFHLE
//imTQ/0pnqA20Jb3RlaiKD11oIJW5XfDXphoIog4zp+IsVp5VfPpkxP9XiL+7y2ssLfw7Ws295glCve

UvVhd5qyB1+mXAnzFfiXKHdLZ/EpBd5Wv5euPF4SbusJ3chQx0m3gYAvFSy1Ha7LmQM6Gr5M4GVhdIRg

LnMO7TOBf/DXuTsSgo8ulPIEpREvSngyDGKEGp6qjr
jmXIbDGJBuqg6RpWGgbE88yoK11fmgend6Jty+omtsOmc7KoeBW+giwqd9ORtRxr4cuRmFC84cYRQ55P

EUbB0lwiw34kqmySADwE61V3uc01Ite+4NsBXleGxqWpEWcjxcJBXc99qMsTrhxtxqvHFokNtp218hNr

qw3ySgsuzzUJrbHjgtvbP3lgmyg/DZPP56BKzfa+AL
KdiTLtuEjr4kWXkuapgLtG8f4eytQKkO7R+CLwp2+zsFaySsrEqkM57vQ6XTlnxWyY7X0uFjEg/clHuy

cJ2ba1W7pKg+MM6VenX6xBqa66S6MXbSlb+dn1LH+riPcDfiz7vyWciq+W5PQwsa8sPmWG3a+540Dada

7vafsYUMWlAzkIgy1YKxMooZuEU6pn/EaiqBBBfBQO
nXglcIvFAng112czmP6XeXiUVM4o/Y12zE1y99QFFy7Bu1GyY6G4BsSEIwE6eNoxQF0VA+RfljBUX6So

mVG7vSKJbXcfazJYs+xJxTi1MGuvqwKCKYTCmFFNlqKJEZ4T0aaix8ygGJgLZHfE2UYkjJ5WAPuVl9TG

D1kF9xAR7jxu7M/hM/0PVRWDnyPt6wwDqNM++zbLCa
65lkNiNroe6aDL+K6y9j7nXazYdFxEdaKdpHdNQv+DBmFe3ieAOonfKpsRAH57RlN5+QHAki4W+J+xFA

tVo6VW+IKxs2Sk51wwZeAA27ZqQB/U4gUtw8fUZeel4FQj6OIHpvbWwssp7UAKTb7UP+iFdj9uIS3DVE

c8fDLkXa/hcOyDqQGJRlKBHJ1109Tx6VemAvMO+3fK
KaQ0mTrDH05QIjls1vhv548zx3aDlzLyN6wOMX9dZtwJLIkgj0ohz+WuKCuCrmVYskj1JysHkUeVzWnx

UC2fEEGS60zvVXyAOi9V04kdAWtTTs6iYjUVeL+SSrQ+SjfmVmLaB3GfXo/oub4lwA7aR++kQ0ncop1g

xKity7HvLQaH252a3oafIwif2JyAJ6ICqFcTzg0Kn2
kVo9L+mRYvXb2zGYCt/xiGDiMhXn2Q8xCLDASFKLkIsQUlBBM5RObiOjs0UM8uxPSPaIA6//5LbVoiHc

17jojDXJ6X9qixYzSYNxqEqtnylYoQVbMdY0WX3idRrwKivj4eZa5j58CqNR9jhuxpBskS7d0mxXxpXo

kTuu397vu+hxjCaJ1LdEuho56ajI+7aVFSeYIzdDtq
AHad1AjStrQqh+SAVXmnNLiFxk2ztrOVaYqWDnHqhwopup6jrQcuzDHsas88uUM9i78Etg8SpOQXdn2O

cefHr6tkLWPT5SV6qfOzwq44o5BIswblCiZnGx2qyhoyQkf65bIVdypksSTMjPCWLNw9annzYbRUrmpY

YyevsBaBcXHIvm/O2nRoVc9hUdm84St0zte08ba+7Y
8SiHVbR5+Q40tI8Fxho7mIObHPr9Ae33DxOi9bkA0RMi+bl45NUNerCcdQ4NEUKhB68M2f6X3tZyIkYY

a+2Ycxzc6Na6ok2KBFj05ybln5NSWtd+Xyugl9gIhsbLHwS3LlGRo0/BoBM2oHrUnBoO7OBMEYl0HzYx

tV4dl1NRt+EIcsnlkADNUQ8sSqXq2ux3KHxo+a7Oqt
AiV1FsXhAxFPAGSAyJEfIsPB0+l27bM9wOluJSb4x74tO3N9mbsidhgoLS420R7lLWDsN52kiBxK4eRZ

cXvxK0Aa/K8iEitnY9N0zW2fJPWR7v+bhdoCvgoMdGphhbOpdHx4H+iIMqPpzNW8zw9mSTXcNRVdtPFP

LFJzrDnaxXVIc4MCkE9AGzyPhCUSauhAd40hX+hyrK
tfJp9QQ0geqrjNoyjMEXe9LrTIlo9/7NLmY5jA2fdAhQBtxKH0YBiF+7cBHVGIsBXL+okLLyxgqsLTim

HyVTpZZlhXMFgZ709jo39kgLLuy07wgXTPyyUPlF4MV4uSeBmQmVYMHFOGkIASrQL701eVoUxiOJUOwx

YrtMIrWgwLV7fQ/Rem6TjFCA6ZwjhGSiki8AG/bbVb
DyN3WsFZeXzMX8Y4wnjVrzLxG75fTHGeZr5wom9f1ppBSFnBHkvzPfxAn+Z2TjsARNto+ETMy+lLWJCD

ezoRDVW6GjyDKdCEV1ZblpptDtLjFEYL+eYfXwtXjRPxZQ8Wynob6YjTxW6Ju1dDHksoxEKkRKCGjU1M

Kv51Cgus19vZIHDOPmH4j5RpaHVsATNoDmvPBATtWD
OlflOY5XjDmynSOPALe+yzC1C0iFWuV4uIMfjMVsq14KrnYz3XXgMkqtMswKE17LLefCV1PaJwG3FR0w

BPaVzGLVXCj+oFckMd8vSqtuZl2hM2iObCxzli83iSTD96BYE0XS0Vxz6r74lK1W+EOveoB7QKyeUTwS

TF+6dfO3jBl/6et0N3iVWNXudvzmGT0u8tWgEZoioh
vi40ShLvUyRWBZ1D7RL5mUcE/8ZhxmAWtNwRy9SegF8aVhWeiSmimjgFnXDHfYzTlPc1npq863DyjDMS

HZktW6lGz4PXq/7bSO837OIkcDTDve5ZhveR6pBgccuB6ovTYLzdcc+bwauisZk7p+vwK1ZJWASitZ4j

2+JSkAojx28fAgyMy8/IvbqQ0c5leaIBXey9mjsjcp
y9JcvSJjTYfe9f8q10iuDFkGE3NtLhPH+z2SVlFI4VF1kRoHbeufIJY9v2Aqt9Lkp1z23wbrDS1NPFzq

HCD9EUHMsj2WBZelJzTandG6xZ/5+JGbzgGp6LZR/LtJxkNf3wvBkJsUxq17Hbkkuf/M56Zn+8I0q7Gh

n8OK0ZKkMRh9/9r8xRbkjXI6GwMnytAka79y3uA7EA
+8Arpze7dVt7JAlWhjafF4TjXLtTyJ/z2KYbiq9eIig+SGva2CGcuONq5wCqB9wVq4UBO3DEI63INi3/

Bye50fppPVCwFpGyZeI+/gkuiWCAqn7ff3RCBAwVX9dNEHM1Ecp3yN22d8l2lb3YiRri4erqlH3gxHeQ

X9rID1ssDArlJE/BENGh6J+Xf4kQjhmDJEN1wejrP0
WydrbZCJYk/mGg/EJskS+IohMMWmSEn0rO45K15MJmVoY+zTqBljNw9SsdWRj6/5iULRAo7SbawMD56U

uLAeZTFr58etZ9kRhxJYGEtuD9o/FdjbkwKptRQxX5grMnj15Yh77ulepsX5IBlvX742CJhR2lDYHGnB

Ci0DWkgzXl+Cfk9XIsofdeBCwKs9cbxTrHVy6bYSk3
OBGYF5YYGB75Wtc8pBULMIeqhChKY2rRpyBuwU3pjyQZRex74Mtzod64DfCH9QTbN390tSfLOz37fwL3

0PEojM109oIE8YdUfdBvoJv5gRTTaGAhRH7N5ia1ftKH9WvlbpQ3qTztBBYynwscSUpig5t3v1VcYBrH

k5ucak6/vLfxFwEdUOHOq95GV6ykBy74ozr/atFR4R
WkzwtvdKAPJPi+cweUQPNBCSJBJ6BKGYSMJCEBN4tU6w55gI26K+M01t8M51XNTBkBaka3I4h2djEFWa

BC9eZVg3DbKfGSVgwQNUXo6wEbdXq6+yzTUkfwz3dumuaYIYJ4XTtAI6sjmyQ5yTYi2F0m3IERN3Xxrw

eKJQsGAuAnuUCopmjCGXNZedfNzukmcUyUNZRxLfhe
mHv8HC2xUKnnQXvsm8ED8CDtNvG4SLsMEXiT+orZZUV6vNytqs7gwcuRHfu0dX6DslujVHJpISOtlpwT

IjJ8aQyAg/ggZ7J6bOOLR3SRCY+9DVrj091ynq00cK+NhbwyndMfeWtTDUxPFbd01Ss8sJI2fOhS1ilj

2hZBAum9hspWQ0D3VfYpF4M9yr23jKd1cPWiKZQpQe
UX511CAZzH9GH0u0qoLHWTzqAnZxtUVRew0CbCmU35QbDhFJlT3rWicVEqySC0bsHRHy3cItWMEOk3uK

yu9TsOOrItKkvru1Cbb9ghYhuuD6yliTMYoze4LeAjofLIfjzNWrhExVu4fNHMU03lVBeboAJGPB08cm

5FIAglWp4bPUU8/ehr130i5Xr6mZ4uTqBJNzHBo2iw
AUsgTT9qNGVnv9XZfbeUHGXKBA1iRE4zYN6zAOzfNeWk27l9wnxeiR0imO101h0a9LfYkuyJh7ZDOKRw

y1uA92a2V1ktU8tgROR/j/0KCS1/R/aFC+XHzZWLykWr75zSKYOM/0IYdlbQkX19muqL1IsWvR4MqPXS

sU6EY7bel1fMvpZ+M6cji7zmuEtjlHzkbrT1wAJbyq
fa/qZs6g8BdxWh2C2vrYoBkx2JjvPnKUu7WB7wwQyO/Ia7QceQPApht0e169NMaIQoAxiMpbVcaPM3Mw

tIZgTsvIQwy7+fG74dmzltnCKuKUO8528BGXpQNlmnmgKnDFp+MjwYZv0+ESKC2EAXSz08b9vriTSYMm

NFD5Ix8VEmY1P7tgUZ8AhdSRyTZtvOwq0xEF6p5Y4t
4FTajee+C1iJ26orwieHhUcUZ8KJGXbXpjNKjEUjbqlszxRzfXBIGAaBKutAEWgwtI83I/SKBldMb5zE

iHX42RtDw/ZEmL/PrMSsMexz+LrF9o5XynfNRmINlnNqDB9VQxqc1e1H79P6k+6yRGwWZji9M3ieoIyx

LUgCrZwYDZYysLxmnJxdGfv0oVR/eiUY5ob1oBfWAm
DjaYOLU9uThNSfgfuHcQf0psIIBAZEZMRXp84kkWavq+1qIw+nEmPCuGJYuRzBVrTlkHWlifo9xZZ9aA

Wpdl0SUuza0Yw05PnylDKitZPSrotLZgH2A3KkpnvbNzXNUt2g18PF2E+VbecCh+xjaupswfb1TjwxAp

19XFE6IAxDtZrywh3+rh6LPsqAsB2AH9fa0CO5//DI
LBSQxWpMeOmFG3fTvlYmW8KAQAnQuUia4TRhyVH0YuIEhDnAimt9Kv+i3UpdBaeklzTHeRFhrA7uQU85

8NQ72v034qspbEjNyEsS02p5lIKSjMcM/UWtUgA5rYYHC9VomWC/vr9WiBhisNQvecjDMELBixIgrNez

1TezUlH7T+JRQJBxAOy19CCeBt39eUSFkln5RP7E7j
3m0cHVub9fHELKefHWZPyrjyMm+A6uqkh0mO/ye8uk0cBnUgoux3z/7S2YiND1AaucmhbqElRIOJgP7e

kz38fSw7g+hP03Rb6DR2LTFA4fGk6wKGpyh8j1ZG7S4q6aRqzrktX7k9WXZ271ulPXVW9MvOaFUOfnju

66SmgkuRBfENZ2VZXMIQTNoD0ulbsfPmm1PnS0d89l
WyBhNNcR/zY0bxxHTZGDFZbXQ+0BgWqbm8iKQwukcMBlRsmXtXfm+lnuI4FsfXQUGs5QJuZzDI5s4kwX

6UQIOpmvdGTU5T15BGXir6r+Tvheyi56ia0VqUyyyRrI6w1mxNnugJvxLVAplXiT+j+egEwmNLAg+QNd

Y02Z2NWXy2hoQIevOZnAkUhYiOzkJxovPBEEbMwcgo
hIxZM+LhvTC5cjRU5GQCpCtN3w7VZcedPlr0Lw80A5hh0kLKPjaszsHsUJEmsJSAbfu4R0RDkOH7ZhaB

J8X84+05VY5V/SemdRCNqJaSDsmLy8kbEUUIEBdtIkIGicN4nLJXWu2XgkafNvn8XdSMhJ7B5kpV7t5o

EI5MlX44WBA1o99ST24I9qFetxz1GAT5tEQT9lOg7x
QoWWVCVDaaPHvQqH/rrZX8wDtvTtO7iJW7/OFwRNCk3oBTi6loYSFk8fSMwwF0ylpkZux+J6jHu39kBT

cupDxEPi9deKwhiBsAcr6MejRZ+L1utizVAwLLmu5q06ZE+WNwzMrqwrzcPbEM1uFQFqeChxXANgjYBv

QiaaoigudE4WcrE+7LxsXUS1qQ6N8J9zznHp3UOUZt
7ZPu3b2eh3pnoGnOS5/UpxdVAjhoclg2uYUlCKn+pso5OUGH3DKO+vMmNUwWIZwz/UACMo9kEcpE7Si8

KfGgNBfTuq1DNi8MX6CTjwF8I6NMAcKtUPQPdvMbbGtDpAQwx4YvrcN6Iy8xZt0JJT1W3sbYHS6KNMK3

8YbpuFbLqDtis0ftchJfna8+tfz2vqLWOR82g/DO8t
SJLfz+oWoRmk/h1gZHjOx1KnOPAJtXmMp7I7Z7HtMICbEcds5sBLOeeSAiVFhzmiyMo17mOdb2dtRiT0

NsVbLWcNUBssWRFaMjPN6AuK+UX3ngijbOloIPAUXp02XywGPFmO9+tjGLzsEbF+oQWG0MED8ljOq7NV

0Qr58eYp/xs4e8uwAekQ2a2392ztU7W2TmGZi2jyTA
uaVsxoT3cL09R/5V1CKgxAi2VIy3PAHCeEp+8vGTZ/DgvKVcquftTPwKSpbEcAfSHAjEE/gc0M2CgPSD

4Dcybhfo8Dq7tZ6cOdMuwRqlpGzZLTQ1NQSEEGNSnBoz49GkvXzRj+hGbL6mSC+c936Si1TK1BwAG6me

Yk6V8l8S4zx1SjtPiog1+K1Qn7OKJhvxoNj+egD1IM
dahlia/saxoiq0fCnNRKigwT0Uua6hKD3pVlis4xHYo6sW01GqZVoGAO0vfH0wQmnsJ6Rp8SYDyfjYBmbzT

Nqps5O0h0OCcfOz8r5+p9vmkcaKTc0OAJhsTN/pKLhD8J202EITWzLBIRulkKki5Za062lC4LKHERbJG

dnoKaipLhxG6nh233Xx+k1r+YjUMtgHAFG2L3+AOhF
SRP4m4i/7Z+f3j3jNPrVlz16NvH0xyDLehZptmrv71z4nUoEBGRYcwnLz0ZDwVAE1JWEmYmywSvrR1l2

r9u7awrsnU1ZE0Lvz+nzhAPpmCOVZMwvRnPhg6DYupL9DpDiYD/FM7tLuuz2OAAWTbjELDFCvDe5RO3w

/eI+Feeo+pVojVFRY29gAezRTj9fgDUVBWI7xpApSx
c7Hc6tmWHFcr5NsrnksrUIvrSwJN9XJ38KAabYSJpHRw+JHeCedgn38PggAYKNhBUfNFAMJ85fG5cv9u

pmmvrloRKTzy81Hdt0s0NnOGHa3nzj21FCShWSyNCpCE0tUJYmg+QQP7g+vA5KiPqi8AG2vTGaEDNd9m

7uXympyjhE7uEgy7x3cC3QfIBddxhrr1L9xUCou0oQ
lH0CAoAgtEgx/K6zRgxskGWfYTGFdu5onQffV9M49uGtv3YMc3C9Qj1rk5UmKTOZRVstMi54dPBNdu3J

VJGQtRXE2Pp85NwCWuzubj7C2RkJKKYZVjHIesvPFK07JJ3bqZXRH5dJHffmqORjCy1b/uLjbHO6pmKG

Q5scvEAWfFmIndVGMXEBoThnTPHOTtiBBjNrsdMqH3
acqjy7dnpVOXGYDEAvhqbM5UlKLq/VeSJtmII//A11B2Hw8/tBVC1JO3M7lQ1VonHLu0rRf2EGCdMVvQ

fegFFxkxvFSdGleqHch9PEU55nZFKlW8BgO4QXI2GLQ1+HrjIJRXMx2GCMNW9qFrZTdWG39U6fdkMgpC

FW6MmFeqlOh7P3XMEMGqWorbJhKgduaovCvO9VbYBW
tArH4kI8Og9ab4HzJHpaGSe+0Jxhj9DH6SingoSwAFcvpuUIDYa7v4kRny/F4iwvmPB51IUOnv8Gpck9

SD8cMBNWgoSahqXtOnhTJx/zhpxpTtj48LOSq8hIPJCvDOVo2WDMoTcq3bZh7MwOLT7uP72y+spofUT1

jQeJef1XNREHFSOMMOIOdluKWFtLWqAN8TZmpULEAk
5A8IaevNmrRS4RLu0isykSRtXBkVArmyhTswQBZEttr4BUtiU02kaTWgqgVVUAV4jIz+fXEogJNb8Ipv

/+zfxKkclaZubDrJbGEllo/u8E9ui1q60xycTV6Fe5u0OuHh5ppWrQswHMVtxMIp23cmX1aG9tAToF/e

iAVHNDXiz/JIGRGsqKfcJTYLXaU7mmWXpqV1XthrdM
igW/2dVKV1n1+MVgGpaZUnD609NUKn3XTUlwBvleU0vubRviMiIoCiINhdeQrqeNQFaxU5DpyQpSLQ+z

YeRLxKxfWBghp9a8qctT6j9E80EVh6yT7X52mFPPu5FP0NMcq6QDUV6RYNf69DFDXEa9UOtxZTd3GNE0

Td9cx7WjHOSRube0ozb4QjyheAE+Xuep+PDtocC+Nr
hTec+ie1FDiW74gFN1IsvxkIWXiVQgwNQOUq02joy7cPHDRQzIbv3SMkWuFlDoVmVcjbIZrJl2FRkZU4

J2poqBiXXQJrRgVJwqz1DM6y1tcH1BQ0tKrI3r1l1umdLsPbHpsGTcvMmtaL816+hOTAgzLaOBvGKp+v

qktOyC3smyiP68eO5vhjO961ASQThha5rn2lUJV6t0
XzG/7OcNDC1aAjf7yvl2rlgCEYDANcqSo9FHpKR8g4JKyDdSPuSqJeSlWG2PQ+vOGhBgakVG5dgf1yNn

bvoP55Lhc/IZFrv6eJZtpSmypOIKFPNRAuJ4iXkgPN20nDAoWklIS9vbsg0vI5JbbKZ/1Na1lt6nF8S/

Oq5kvlfW5iJn8z5R7mykDs+W6fgsfoHgzUlQVdvsoM
qeQl5mGQJPyXzwRz2tljZiO7PYDc0ygoKn3hsVXfupFs/UPwgCjrErjr5uHX6A2PLyTGZHuhqKe4vuwz

+QoqssqCoZwRsVUul4O63YINGsUeeo8Ra8/WW0GfDrgJmQMZ920Qvsc1BnauO9/HG7uhHOs5uGlf+iQk

unDcVn5zC5kcpMFyOkjYMD+9P10nWfQ5EruRzIKBQH
c17Oizwvlgm34CsK3MW+E8aZyp2DK+ITmbuq/id8s1izJ6eMeWzTClwcujX7MWidOoy9wlTDZrbuWnO7

4lvZBgSHV2jmDUEowZNMiUVPoqtei++zTp2CF6Piq8cn0yJMymMPkI5vHu+1cFMniJOI3nL4TLdRLZYe

ugV49RF3bP5F0xFjZHF9pnPCOMKoeVIoNA63A7kyij
Q/rkuPygcj4OlWPaJyYrWcBEjovLxybQanahwqBtYgJ3Ewg4PiUUcAjdvF5UZiXEGkiidtzjf7zbasyH

qbs1oSQ8q0OHJ8efUIHfIjeXTRw4s2i2US6mqEiBCXbWyrjMS5XSjmVLbqGT/VvA9rzb3xDc4y2vxER1

Anf7Nyg6iuhzDHlZbkfelI6HcX8vIAZH68O2/PMX9W
aFEV1ezke3Dd0bsH+AXCf1Um5BUuqyPv2xiF+qmMwpNzRp1nZABUJHLDLAlcbJockjZ4NdsAJsNdPRBB

Cbn0OjbsTcxgp04k2uRW64PoyPoJHpjvY35cVTTzMKhTG/TePlJacXcn5ohfol+ieScwJc3nVP4sb5J9

244IzbA+KP4x46SVXaUUci/JbOeaMt6BLBkTRNes0j
HTbmDRlHgauEDxOUM7xPqahc72/rhggasJB+4tnGzt4xPGj+kf9opUBgwo6ltCBFknOnujPFioikiEwp

zjLveIIzjoMBrEimew4SiqCxjdACJSKA6g98JC0KRE17h8N0cF0dVLtrFVnN1N3B+T+cEyJRNSben5tx

UeqjK6i3pH61hGUUCgsmvgpc9zQNG1Rshe87wp+e6H
+lZ4xe3d3Viwd/oZyEB2cd282ZVz6NXQKUWf9Ure4webAueM2R/mg3GF861RK8HcCUhuvrZePMWYThut

ZIjDWtePBYCI7B2ZICIlxuGO9jkGWTZ3o3CLtS4FNT8VJpfZbnI6XxZD4eMNmeeSGeatX7aMtkvmiJNH

EsFkArXUMG1R9cyEskbtQrNniCvgwp+4DCD7zALU7f
En3+bF4ugKA4xEy4Fr868q6KtFvS0sfPbZMLz0S6FriGBIcJnTizAKXD1iguo3opL3spoj8oXPgNr8hR

CC6Rsgd0w3KZplm4UZY9zUzpfs6RqYrKcyx5PliPrZpuX6BIKjqvXxvjvq3QRq48Fn0nJYk1cuLxAG1b

REhLksIkntyScfA+VMlsOg8qU93g0FV8ueUmsgQLI5
HbzKKb7MC2PIl3XYfTsNRYWnQDmAg9vdJFRwYqIxNVlwnm67g0O8xLUnzoZEHm7+2ajmP95Hhw934qP0

A4UDTsN0IVtr1eQOS+xzVzGyb5i6vQrSGi3kk1/sfgkb1F7qCcMwrtQaDWFGNsuxsYLzFOp8PwkfGCeY

UnyO4YrhL00yoNQ/Z81KlzKuQabRcyS6FwqMUt7QwX
9+8eH038Cjf/RlW3/TcrTNzE85MDDdxTr1Mvy5kuTpLO/XH5GwLhUFZC9MTwI4lvv++/4MIZp6g6DCNF

BkxUfqoNOUxtH1zwpnTGC4vtOKV/m/uTR2zwy45LbgR/R3Hgdoj/q1iUMJHHsNAW4syOy17LNYtPOeNm

tHeDaRzSaWOJ+Xtr8sCPaDxRp4tyLcrOCXbHAWeaWE
7sAATeKRW9V7FCnvxEJg+x1fNGKkzmvmjWOQge9XxR9AQ9pJWvzqNCOfUGtKk1jTNliQiCigHLtmuZTE

rDAdOhGmPmADl42YIXGbA/k7xiElpRMtjhX+/ReBqaxM58JKeYDbG/LRM8MJJrRwNHHnOcXiVYYNMixP

Smkzobkh7SJua5EUV5pX0iyyyF9kPEJ5BijAqyT8Fz
9+a17t5R0nuittfEJ/oqmIlzJuT1spSl6U3bga0JaIuhtVnvUBEHdTq7fPfxjO8kAeEz85eL3QUweW6t

3mWzWUlt8Kx0D8WoGHXxCx1CbskhbtjGWHx9Fnc7WrV+rMQZVKjHjBo9roTXoQQttIItLDVgMZ18Mrjn

oFB7N8xQafqi2uAm1AZYvBm3QEIlZH9O/Qsc3uA758
4zUz3XYrPihE0PRwn9bezqksqgdlXY5nuHObedaVGv594s+1whjI2S+MqZxqOpgor2uWj8CuE5ceF14V

dwDNBzoNssEqAgVyQDfqidcrJ+s0YJoqawyIsz89VGvZKqYQlq6N2Mjz4J0t/hRdS2exlH6f4DjU57sK

UGBfXfPgYC/QF01yyItQMiunC5ca00lYlvk7toqBnV
VM89Vobpe4z22El/pTvE8SfeEBDFtUq+T/ocSx6DA4A2q34RJ/lG+42RELiqeM/CtjD+t+vXBRkrZEK0

aJRgkbSL7BXHM79G1LTfzwEIOfH4f5T3/aLLgreXyZWFiuW7FOjUuiPGgt3QEoH6b/zZGXpFhn4MMKJ9

dp4bdvo27/qwI5o4pXku77lDfnL6CHXX+6XjnB1wd+
xM0lZ58+wgqus8KBHXqrNRZBseN/zun5ce2W+KolyMm7jY6VWCM/XwmbJoLVQkjpQnyISiF0DzAJUEMB

HX+KrLWuOxGdrjYn0hMa6iTzueer3KcBNWpfuSgag7MKnRD6NTEmAtl9AgF4nIld6QrBVIHtYfRWnEnW

N+uTBde5I6vXuKx6q7ZIh1NnIJGDuy0AJ2rcUr3j0b
BUegVF/TDEBQin5npy1an35YXg2BybZ1tn3jgAELGExVKvV2H4f0M7P8jlZtZchLRV1d4Aixx62ulQTE

oZkh1oIC8ZRMBTVR9QIRB8XaVCOI3sX/UjNBo6j+dxCwjzfbmquJQCrLbE7X83/Mw+PAFXM3e3LS8b9e

eJ9KLl2UAfUG+TLi0ZExl1sfWXpYRJwZmzB0Go8bgF
tNqVD4/EBg4jakyq9ETijFq5CRGtACOXH9O5vXRBNPcoKjaTqrqQHnxJZbkXKxWUTLqNgwKZeKnHLFym

CXwDrBHyA3XmM8Fr1QnrS3pamFlbCfohBJ3oPhfwfYAK2HsCOvmD7tFuY3Q2741+lP56FH11o1Sx85qA

HCs0s0q9uJy9U1/xNlUaUb//ljS50h6WEeuZAv3NOI
nLB6nIUXngZX9b9ieho/4xfXcovs3Frsa2i3sePFOYtmFOMQ9vweak5NPc1Mkc8gaxenuOQJhbGs/uHO

zSIkuxTB8cE6QbvlxWTLJAhYpD6kAym4rmanw4sePIwZi73QEauvOIQIQWvaYyamXNEcs+SdFL1/1cdk

DnpRGFiac7HNK2sm2hoxnGNjlKEUllR0cRBWShJFX0
CGbzMUvg/cxGpLn02gxrHVVi0SUrkOUA6KmKa6wKWoEL46e4N/gThCwdJC2VAuaoyvZzNAeZWaPZ53RE

UecpYja6f727nbTYkVwi43KijIjfKgDTbsj7DB5fQG6Ec/k5mqM0W/a5fAJ7A3BkVWx0s4BOzIuuBhgy

MqMCqr1BEIRxXhk5gZi4Rlw0wWu0BREHNSIhy6Xufm
CBSd8A1yNDUTggtiMN9KXHZ0pJqvigczOas8YpZmp4GFb8uibw96y5pD3/snOW6jIlz1T066P8dumWkd

pALTF9xoBKkofVinuwWPRR8eICUB2ysEUZ5GziygxtRyOWdvcl95tsJLzIz63z6xxkSCBNwLTFJbu3MZ

co8Q5aQSr0qK88PvSsejkBxI0wo2k+Patricia+A+8pNAKc
JngLupoRMr12NwvtE2IOf9SJ6mkKXZu3MsQDMd9eQkpOiPn+8kAvv1tfhSeh3YRaYvlGzQVxFgzPlqAF

PnmTuN+4+qrOHaWt3u6w79/F6ncr1pZXLSKjcxKsSP/iPfkzVVrZfXakv8gISLBRcxLqzVbuacto5n8G

UaLpseRN3Z79y+3QB+4j+BjpTYwWlXCTjrKRLOygJ8
UiR49YDe1aIP6mCJKA2v8R1Y9f6TCmFv/dclrprvxcbyGN8fK9pv73sO9n5/Zyeo1HLIc6vP1NwJR4R1

V7d+cxBtQncuUgMdg3qQx+ygfAdP+e8JE6NLWjgX9YyLeagKcqyoFiHkP90Ud5ExZu8XT4fOZmKCVFBg

ZXM9NhtCGky3PiB7cotZt1We9GOgZRCXdR0v6/SypV
oLN1dwlYHjgs7Q+6/n0/i/wy62plqA8X+5Dqa1/SLstjeenhM9nSf/6eeAYFYfeqNdGwWxLBRTZ1pthR

SIN3jDrTsrrWsHEJkNUuCfRBJ95vn0gN4fZAl8s5XyfEbbLr4z1xX8eyHbMiRzwC7xBLoEtNcWmYaZ2p

XbDp4Sbu3eBJ9f9tA3UlGr3ewnnkJWf4UiIW4JbYOS
yo9BG8kabj3yDbz5NNYzjwufP0LyMuZzqUKx7D+Abq1v0BMHRZ8GN2BnDd5sYSuaAazcW3SWOHKj2x0A

Ege62QmdyfxDfRxweIeQWRxo0YXI+0D6fRPjAROCyJAFChOpMLFJ8mMRgYOBjd30O+kTL8IbWZHlYQk7

ptBRYHSiiYRdb9WtWVrBarx6KeeuOWUev/BX/sKo2e
pY+oMeWsS+w5NOmtzjtuVzjYV79us6V3smw2K3WjfFhyg8XAFu6ecE017o1yYuC1ur6pf4SKh7HzMOuP

ju4K4eMvCUFTspEPBswYFha+d+/hb4UpTbAXBB3FQW002CbKgib3SZC+KVW4nu8l6UZufz3miU7boTof

5ywIpF8hOMpnuVTP4guioDUwRyxkHvy3f2PCx5vvCy
Zy4hgWseL4qx2Uz4DLthTKMx3P5cmeMXB+dF8a3MKZblJ/e52Ebhb1vH+Br7+K2mngFWUQ0Hyet7sNoT

1sXMMvXihcoEwzaRAImQ7zdW2vFHaDo33opWq73ncG9YZ8NnxZCd1e4nXsNQw+q7AtTjLIMIQ/UaCOf9

tmq6D9fM35ej9SNL/v71Y2/ZTtTw5pOILGTXae328Z
Q3udIJ1e9qpmcpu4LfAqe650wrmN2YthqEOKUclUjNvIed5HlgeHdSeOjNL+w5sR49ePBUQglGOGCBMc

TFK2p7BY420DZEpskiF4c9KTo18PWeSSymnrzIqoFU4F+sSbDP/SjJZp6Uj5OoU67Zw3lekW2HF90qkK

2DiFL1/ObEbDLfZLs6nu02KE6Qiud2354DaQoAzsKm
UCsS5T53q7QjmfKZNpqMy39bSCk2nBhlW9q9i0LKPe3fS24HdOvc9NgcRQ8K41QLVqkrnEyJLaCkdkar

knFr9Rct3epuZ44KEUaEDkhEW8LQEqghsvQq/VgQ3AgFxSZ80Tb9RD7ZrInPFOOLNM6FZIqMSqn51P8B

631AwP2oo1GImYy+/3cBuZr0CXUoxtJ5JcvthUoTJ5
kol97mLDFkg3wAVZMOchlCxfwRgC0SaqPYZqMTtxNiIHPM0OyVuJXKZW2+HNyAEnFW067g7h9J0IRhmc

Im0QbpSF1fa93ikWlNCdjF/JJ1ybobJYyMJzD1wAu2lZgQKzsdng4CPJEYiWHAWqwWKtf59Kd2jquq+f

Tx1m4j6Tk9FomB7GVCnORZ79Dd5l1xEYewbrKXZa1q
c89WE6xhagRQkXLaHEkC6Wj/C2H3EDu7mazb88W6b4K4ZriQxLGWxkQgSLE6Jd5w0xSlxIW3hKxHiQ3j

ZWtINRpDHi328RkBGCRB6o8zjZwn3yIEikmQAapTiRvA4PfeOyyKLoZcdfT03vI/39hj/Tezjyz/2q0+

nvL/oC/Jhzq/rQv1NBDwHhy96kTUtOx5V6b4267/e8
fBcPj56KPsSyy7ZkudSOkE1y9USU+zHHIGW3oRZpBmbdaj0QY+v4WSHhrPcescKbAADUDhMwSaMbOVN+

Zq6xtSUzkjHgJ/yD+pNAgAbV9GglUGMdiVqQFplHmdBmUCxru0DXXBwK2NQ13afawkQt/HA0MC3RMTiU

3G5FOSaMW1U4jsCHgcbtlhljYZS60YqtiVwtjg8cwt
pThsW7bQ6D3GAyDA1ZMvy4UDihiB+tmSKov0otds2vUTa08JHgKD19IU0Drhz37qcZDPNE1SqTHDHWGy

+gyUeg9k6tXDt7TE8NE4GW6SJj8+N6BfxJdn/HJ7+6KiyNwI2wqlkXQ11+qLd/OfNcW44tnTIap9Qexk

07/hSkaq9g1109XdtPI71rM1nF5IYoTLqZEHBdf8Y4
ssWLlshmk0u+m88ZAdk9A+OLJVGwBaEW2vLVituzoDmRvzTpTs33KdHN5H1+2oFjbB0iyJhNmgULIBbF

LTTyw5DCJLOQZmdml66qwQfHzCDsxR3jJqhbzTr6L5GtaSBoRp6HkQoXi+vPq4BAiafcSIT0dsksKiVi

oTMUSgtzjs3QrTgiFo4AWEfOYOE/A+Oa9yOg4dv8ug
FSF2oIUrWJqU5aDxHNvu0AimcbDddq9J+NIi3skBui1l/OuBZAz7+iz5iffb9jn0IqQYFm5p40RugFR9

NKXpx81X9y1mk2wmdHV0IRPlZ8/iLK1iO5w2bn4B/rXxq1aw0pyAM5teS2RxWW90eK9ERW3AZwNZ6/0Q

zesxJzyK9+xubW2ytqSgIeQM+D7l7deYP3k+gD4G0c
zOfHkNOzUc3K1hh7/7DW5pzM4sTvcWADOtGVfhJrjXoY6myhudUml8fxxnMZTJ8AXsxUkp9pVHGq4ITB

Re2AwV2jVDeyvkEyZYE4gaeqvyqpYRyEzFt6HpyouqUIcIKYSRjqlTcYFpgXiqtt43TMEXvZzlnRPs1a

iAVYUl+Vv0r3gvGTPRx+iOocYdtsOwejQnc/ytwb0Z
ijX43xN0qaTvN+ooDKaBUPh2on5J3p9NY+3qSiCiZY0H8/esy/rRJWpit/WLWEPDFqWLwC9yRCaOFU/B

S7i2/w/hGL9oKNOo+Dgj7UcAgBEK0YSB7cpwX3zrB7wauZCt1vn6ZVEcZDsdmGk+5WvNp1NUbqlhR0rg

WUlamaPJdFQToUMA7MC6cYLEcYc1cb/6AeuiiUq14z
werQPYuOVIyGjpdm03E4eVwivFrMJGKVqlnmqI/bFcK5w5dMwk2Wz8cL36MPPGS3r9iik4BpODqjqrWQ

JoNOVU0OzZIsX3J0Y63UL+Xx7QHzeTIAGsmM7SzHeA6BBiB7q8doJVW2zNhaCF+VZbm8eN3rQOO+j5LD

ghlJfmqWkGv9uaijOBlccnnndraMoai3apnF0DUEvn
nnWDDMwG2Tw1CnDQX0Cna/EF6Lj3C4Xsy7wm9ijNakpAsNKEGJkFzylOE+NqbfknQky55q1jOyXfiVBX

YDMcCNJWnB+Gr2HLVZZ9PZhyqAaIC7TcUc6OQtHD3GR+9mQmA+hidc6MIQWdN4cYXHtrgw73d3NrNBZX

+bUTewQyCXIDoQ2kkTrbFK0eh/32d5Vqf21YkTRHpA
UAXiXJ4hKmH/tI3U8Q0NUwAgbZ3lddh3RVLgs2H73tDo/qjom/x4JAKG88SQ/4wp88D9h/bqKnfjC2ec

guydAWj8eiWdH6A2dyBjhr9jGB2yWPNehiF6L41rq8AB+WUNHQuaj1y+YedJqWu/sH7NzfDTeOToL9Z6

vpEh6WwhQSVDpx6+q9VvjgwLj4OxCa/S+UfiH5Knus
M2gd8LNv800isl6DQTiJl1odmIPkbnx8DZHRrLuJttdHJxvvYKt0YmCQf0fxLnBRGdfG62MLHzWJwMyH

mT8q8qgnwmFSGGBSS5cebigPeOOBRbXz2L8IKJWm6PPV1TEIbCFe5w41WTLhbKd7ywMm/SiDHMEOhtvr

bhEmFSDvPsaEFWqAAjE4cTx9WIDJft8/k2zKygB/qo
OInInWEeZkQXZiBjfnm7ZdSEJJiPFiKnGh2WVoh3pi++EqxHfJVJJXY//MWQ/AnpHnkuIZ5fUVlG4137

59QsoOn/iq7rf3Uz80sdTZVNATaMXkSXxKVQ1r6vDzyoXHZEADUXQYbFzI2dPHR5P7f0MPsCFBjVPxcp

7/Z2h71mr/wbu6p57l1wUGmwq7Ukft7pYwn8xmfzGp
fLzPvxRwo4EqyOuIhIs6XFY8EC+wUmZhmdMC49AS4RoMtagpslAqnOZTvcNwJxOOlxC0PAuAz4Iw78Kf

OuIs275fSaFQn1L4W5YG6976VkpUO++HgRazUQuwafBhLd7q6WatisfibVnuhe88x58qyIdodjRadOz4

vLQF+yP3TVe3xL8StnGctXrEHiGcVtyU3tcSHdPxaN
ddSkkZJsGahd5dNOC10wuoDJRlI7TzDrPOqTPE7ISfmWb+AcHEKm6ZLxUDvf4mLoq1MVpyYyx/Tootie+ix

KuD0DIAYY25WgohoKIEJGOAvdEjV9No5VorR3Vg48blE6pbC3ybf94Vpzb2sEim/bqfZ/D8ONYO/mF8r

KKBubfuGTPxTox3A/Kp8qwdVVntw7Sjm7dOtaCyqIC
d8JkFNBQO2iyXki7Lk0D3w3vyBJzcO3xSy4OPUvaA07Nbinu3RhpAuP3PJe3nvGA9Qot6a7xsQROnuWQ

dtYMvrR1jUfkcOqDLt9Foc5ID9AznKEXLQSFByo3ACx/dOdjSMov/nPcGNGukYQ0JehBmgpHok+q5+F4

c4iaC8mXlph+sIaUcYLNpREZBKuYm34gPqqDL5BkHo
WIPeDFwUpCo58rzbkH3rNaq1JEgPm0gykBsavl7bHUUyibqnjILgbr5E2ARdrFm/lfBWmNmAgMwqMtCM

5SiH3AzIO9t2QSHNXspxe92IRwS4YKxxzNxXm8IFBnMqZoPWI6lvkolC1pIbFNZdUtxXQZeIfrzrvDKG

Moore+ODd4TquPGo78ZGyfGJSjQTyYhrKbeliccgOANko
fNmZxt3qoz7O9CKOX72H3TMSJucA0yXDDgsfvJsiQEJWzVuQr3abIaZW30c+SSSZMfAQaGhuIN5cZbCs

rEKdymHqOQipXMk1aGGO2DnBqv+EO8jUdDcSdSF/EpULhFwpcfqT6RyLu6uQOoaaC1PZ5bfmET7AizP0

jjH3MD0hG9ob1HojCrVnU/UeESaiFJWi4pHK1eaxrw
S2sF2Uo+1JXPhplLUtfqJgpuELNCuvoWltpVgTz2gJDtDkTjsYTa2kK/Heg4wtOwwgV/62BSZOKWMPIH

CZ6U6dTJD4RBaj+eevWDHN1a/wNQevO/nRY8zaXrRqaJp2HfL9TVQiF7lN+kaWId3rHlzOjzGCXv8Cdt

m27OhhM7yJ+yy2xbXIPHQY+p7A1iqlI3PCbtV7fgeq
krxwDYzRzhac0oBHncJ6BcrtHDF0eKL/r4q2vpdg+OPGlw7qN5ICLiUYIuZMFRYWqVZZFHW7wLTOov2a

ogOfW2OPQ/rfFobB5nYPhAkRS/dLshOzfOFaBHzeY4RVfnNxU4fjS6t7HJf+gaAIw2HBwAiwXR/OYbU5

Wydz7JYlilJqBYH0Sp+9av+TkST59/2KWMv3C3vCQQ
Y/9mFGY0Rz66AyQHVbIkPU1c0HOfijotWbAoPPsLIlBLCWKdtb9z0x/cMCtWkPwIsP+sE0s5KQNtTcyn

AAeNAISoMlTo6dJGJuBMP9IC0sfZuUOd0fV3BlI1pGS7qYua5k9wQCNqpMJxWExsanAR8CzmGt08ejIA

u84tYrQo2JdinWHMiKW4nSjYa2h5WIB2h2MJC2zEPc
Ko8a4vlWfIgn4V6ZqRyYLy4otoa5FLrFVe9JfQkWeTJ/Me5Bo9601BFvWlMYMhfmQrqZZ9lGv7mCwtOn

fp7GMckqvhm4dqIQseJQd3NyOcgm02MxRBYuknf2Yb1UGRwhW+Lbrkh+wfoxwZjQQWRSaWeMSQL5rybP

I2QxPF8XclUp8E51OpT5VkOWZmF0+vJGF4rU5F7n87
TZWtpcNH+sBSk6grsm9fuJNoGbkvm0D5N1x7swUifiez8qV2Ijpf+qArOPvees4X1oPwqkrFm+MH4Pz+

C/aZ0gB5A11vCAO/mhgTlChTBKaIGlFAtBMsoc0GX5ROpAcuzRdNRfQXKjDyjTc1F0DKPOIMGg7MXk6n

D6cIlV11inpTOALaQG0335/wjdQwm09KeUk/AM1QM8
GXFObOQ6svY5P1FXBju86lMyNmus3kSL9WoaAckchTCrQtKMSBoof69+oqXF/k1eT5dHXkDEpYyYmqA3

KJjW5pSbwHjvB7CIsQ2snBTkbu0HgYacYGoQxZ6S1Qb7bHE6UABj9VLkD1xq6Vc0/jkOgoLks4n37UpE

Y0ns1UqpwpP55HBgG3iPWex74Rv32tpuvZ+hbnAcXI
79zjNagngvUJ0n2xy2Ckdn37EG8jxqbB9ZI86cJVyMLipUmYEaaR9sXdfdi4bUh2451sLMffXsV4jAVj

16Fu6mNuqA3a1KStJp2pMJAjlq8aXyUPJjebI2as4NPw85osOGNh1T7gR41Oy3/JUZj3bcarJ5NG+0tH

rst5EmKdW0Y2ls3XAd6sHBmrJBIuqWmt78474piBG8
y5LhdnNT9oigR2Hxsr1jlT68LQ3NcGX7Xl4K6otbULHaSSU3BJg/7VVIfrOVisZj3iK0jAXbF8VArtHS

CdYBgI/6i6YL6EDIEr+F6+wC9xhxIsAQXf0f4jOaZ8/SOKxuJbKYrLN1sxrqXfG27f+2HEnJUHY/rjgk

zuzg7sq7n48dtukyyRQB3lft+RqG42/Z7qu+5x4/JK
Jh94pTQR1ei0wB57r2g06XkKs2a3cro8+OMuxrOKy2TUmaXjoMLnKoah86raLi4kg7/NswGb6y06W0k2

0AL96Rrg7ryx5ynI6xlxZOtrECAdJ48hdU0W6cw3KohLRUy77pcdHG4l9P43riYIM8cYupMTeW6IHs+T

NbJ0snxbhrSFbN0GId2mcMdUQkZcOl0ZhEaHbD9F0r
ClIp5vjsMQKWKnFuhklyyX4x4mEAKIq1uUjFwQGWkEQhAiPvUJX37M4luFE4n3FysDSb8LcJsyY9H1R+

kTlVocEzTEl7vKkiB/ou1dcvAHL5nm4EeYbV+0NEdNb0NZq5XPKfhHep2T8UQ4lEMahVjlUObMW9gwyV

i8w0rvijVbelLvFODX2CH2jSeecCnzlnLvJUI2iQiS
unVyy6Z7waycrQ/oVgRvJvHjjTx4V8rhBskbAB7Tf8pcTs1IMUcyS6zikmLqxaVZDdij58jVO0/d4efL

ulAlgc0hP6dDdBhkVCFOlqExJIF3owsCJviennEIm4QT9B1mOE48nPJxM+XasgGbDMD0xzZYOZXPrU3y

pTeN4krpmJP8GWJgFghkOX5xrHoIqDCk8h0G1TA+GT
HIG9MOobQblFeL4Hc2xlQXKTt+FNVvqstwhWntn9rORsXAK1ichKfQHK9NSt9y2WXFwocjw95WD83sqW

RzXOiLqJrO2ftq7pDxjgQjQqzB40TH3LQAlTxk5ND1uU0F52a6eDn3omuaYws6n2tM96ATesMyIx+ma9

lwousW+v0ssdUntmYLu4gnOuacZjo/Qd86nhU+15hN
CV/iG/uQ26kakz0tO6WuhUY37/j2Sr7/2b1lav+VVzyGyprQFE+ZQ2Awj7kHBh9BLuQ4Ip6jOT9uvU1Z

jALIuYF/6LvIDgZn0cA7RsWWjApCTrEEF9Z+c3csG8FzbLq8oyj3Yl31rhqWBfpFt6nstmEYiv1JWFGi

21M2p6dUCJfiUF/0FjaFSw5OUWvryl9RxdLMawKQOz
TgGSSL8gwvL8uj5BJkd+gQuRU8Xz3goJEqRcueih6MrnkFXHjRFRE5Q5JcWfFjaG8Z6VtPAcn15Wry1K

SKyqiu+6BQAS16qDkTdCK37i10+4g98CxcDP11so4wtq2cCz9TuRXTzx78gNIF8TGcliBN9IIqttNqYQ

CQGz2gIwnHchxfx3izHI01PuYeO2h3OV3ba9hKFBaA
ASb9DnQbekF5KdVbc4jWKPeZi0Xu6W/T4idsY8khEzlI9ZbCFl5MQm5K1S7exPcRWb0NUjUXk7cLNxdY

DvMk1E5Vgxb9JNcAUrEBzdIWDS0wZLaHERg5d1ZQV63QIkqyPXowAwVYPYJ3nzHOUI63c5LKdlIaKxoJ

Vha2RoaCV2aVdw6N+/Xr1wsOO6qcFwDGW9zUE0jTSy
c6ZZs0Rx1fHGhBBApcO8VE2x8qRl0+Yqo74/1j+3c5yu2FuWY/m+8n3GRJ2qo2Rf+3pHH0jpTuJjBzze

iuJw478tGdQ/FxTd9S32oILGFsMr8WfGggezc/H5UQjGzqtBGZ2K8wDfR4GONmeUqsqsoMpO881vSjm6

zIzVOWvxYRKAGWoSQ+kdww5VAy8Z7T7tneXAx9fg9I
4rFtCnS0cCOULb5FpdzLo2kpzEBFnNmExTvOFoLY4Ij2XbUeTHCJt8f19EOKsRL8VaoTHk51r4HdDmfW

uWIETgg4XcRCrdwte91w8CBmCJqtZrJHPu7+TKOgcidYZzpd/ByRJz7IcRYFeq0HRFiCVx2sAWfGUec1

vLBjmiUleJ+a/sdjaMNWuLR4zxelGXz0g1IwqaLx3z
PMWz91nfQYVjbcaAra9zDd5wwn0P6OkCjEO6JTG31RBq0Ren2gxK94qYmtTPDZg1F0R1dynjDzzOD3Qo

vfZFXvPja1uu1EFVrWsR+/msAB7LMVIREr8/xoCxxkPtOTff9B+JcLC7CLK4tRBwhbYyK+fM3Ex5CdfZ

CINid3x3JcbNnNGUemOLCJvIM2Kf3WSfHp8zWzLh7J
SV6Q7/pBGJZJ+Cq2CaqmsYM3pgjCqnx9q/Jlbr1QRzT2vMdGJpktwaHtn/6yz1PLDMrFyZCsq0jfqvcP

9dIieU/eqmkPjDKSXJwa7aoVnqF1t0ak0heXoXk7JKXMtBuBB9qAubuEwVIM5clt8s3NCN9WK+r16AdL

D2nU/rwxqEpcabF3Y4PMdOxlB/gnjfyrkvHdvysZeX
1VWaKUF6a+MnDqaVLI0wZpHupjlNw2gZVnl3LgcpO+m1Wb+SfnvRqpdi9aGC7dxioJvTTgLL+eOgMtAi

i6Ra4HcaUtTwcnHJ3sMlBoBXsW+Dqk1M1f2opsDjJWebAOZKPLVO+uJAS7fzYX6Li9nBxT+aFW5K45uX

iY6aS4TsQpAI/g8+q7xQZdAf7nJk8Apq8sv5+9/KF4
NL7wGmj8stEtmeWchRd2ZkECWkc2s9UCQQNnwKEck21kFC20ka3xY6sxa5fdZTqeFeEZSdvbnilrDiqf

3si0idw05R9sCIX69CmFWvZ9Xl4P6kRXmGoKJeF64SguTuSo13P2O7V/6xSyw3VBW+EkQrmDOPEo3Fa6

xZ2T6ulOdnrn0erqzwkQrllkjmDfLp5gYfncNWkYwE
JnrGSFtL6ZCMvtEI86mkpsTBhrO87jmyDxAb4ie1tlyQEwIMGFWK0kAd657h51498xbDvJkSZ20RBQZB

a0RqqCfZvOXEx/RuPhVLrhL0krH4qiZQxeK7uLIayb7remDkD4yAOZak6UMgWd4Q2QNj8BdDayD1vJpg

Pzeun1o7uNhty8sE33MFVrs3WMfTHmlrqRZ7ssa06/
O+jVe8h9JKAutvZX+E7zXEO//GKTytnyg83IPjOke50f4PX5m0FJvWMi+Q/1V17z9EerfTDxn83S8NnT

bD2jNDt+rrD3IBflYZU1N3551jYbEgMODgcWBh4Z0qtL4x9fuypmwnKNlT/wEOSnnGPyQMidnKf8RXt0

23L3/cHYFjoSs+uRfiYEkuue68maQnvKgsfK8Hzy9c
67cgJ8FiV1bZsZs+TcA94Md8It55IVhvCYFGO1VVq0NxKHrWFKq2CuLad88y8bpqg9IfW7OM0iS5GgtC

AQWAbMiffAsQj4BzUsGaFjEHwqkk4gHOxv0OQhJOyAyRohVxZWwHdZky4XfbIhITxiLHV13ubEB6NO8B

hHgN9dG09TFbVMS/dzuaDIbqRdYtWr2jdyR840l8mR
gPtIfWhhJP2d5noszGifh8/fdhJYpMY7ds7jw7oBx9R+FnzAbRHr0TbJHr4zCx7utsjKShaQESKfpgSw

osg/Z8i7lHArkhRA7Y4aUMcbSTkEM6rPCTOHeetHQAEEMtYa3iHcdNubC36D5bwmShaRrlp+YdgLBCaN

QpYQRF0xyZ68igv58sjfZ0+pkBf5zaPydK2k3Opszk
BA1+NZnmAPjZaVgaArv8vaE0nBfOCW80jmJKfgqiJQJUqBb+Gp0RmotrN1cMIlp4tMM2TATbPTzi+mLY

yKAqVN0QBAg8c96D27aecXlNt+8yep6Re8Q3T6PPcOuoskgKzrCFrD/FmQF/TKIEi3gtATk7HqEoz/y5

3MtXlYmS/U93vQM3dlkjwwWGCPU3T96pkF7GoqhMCL
Bix9ZpFCvJZi5O7X6SQju8VW4+hO+WRq0AEBt6XFaTPRiowy7hv/vCr9m0ns4J5kyJpelVqFN+IFc+tP

rSzRH1BHWPgKhB+AbHz7e8NIfT2F9gTPZB/rsuQBHuxWOIhKdKM6r4gPtIV6sM58vg2OcRofVQeTpAnO

RYzGxU71gzlJAaQHPLMVDguFmZrMK0C85eEBj1mM14
EfTRd3CTxn9M9QzXTd+M385BCoJ5MkG5RPtXhs4XGZUenjWe2F4Jm6/n93qouD/TOgrUL5C/T724HSHK

3zfzTm7PdQL6Nk4aZ88ot7GdPNEFy19GE2oQBIDEEbSnhxiqAAL5kDM66Upn5ZDeXYOOpiYMT7aA/QSg

vTNKIQDvhLFA2BSCvFGZUpdPaTxfoLwRLqwIrQxDSD
7TJkOgSAHnEtP8Jk48pm4jhYs3YtMfxj3Hf1P3bYj+IoOCbxXfjvE/Gwa93ZbB/Mook+cebo8V5W3K03V

av5UgPZXgNPyIs9L/zACWwSI1Uywsl4pVZaFGk7WZ7qPf/CL7lUsGns4zTY5v8Qb9v6REIe4M9BbIo08

cE2Ir3VQk5og/fh0XAoK1aMEeUcA1uVHxYavfOtuX5
JL1sYdcNgEifnVoNxR59qPnv68VsbuXB6LnvsQiCOVpB8o9vBGLJ2oK5P+T7k4tkd5Kn531saN3ITBAk

gPcitcEKxnginnvc2cl2cD6CkN9+T4gBZHiJzhnrsgny0l2M8nuB4w5/U1tWVv6J98qsQGrmqPXUnoE3

oAFyM0U+76fHrEq7ZX10rqCWxHTgUidwviSkc506L6
wMLBsd/FTYX2unaiduFEcKlKFkOwAi5Mma2O2vczWF9iQR8yjg9Wb8HyVpyn3/Z6eXhvmjOT4/w/1/AP

zzeWS9TFt1WfOg7kaNBZ7RRLpWcMERCwoipAVHwTqj5L7OHuCVekiK5nKdROumjrhzfJOsNQeOmeO3ij

aUkWIM1mc2kaXZb7wEXoFKh4nFodVfLKplcsogQhen
/FN/Gq0yjxq6SRsrfZaG624lNHQ+dRPby2xQEU1zLjpZvGiLrkOyG2b78x81iQO/HDn/VBnwC6W/YiEY

4OhI+kol6nMA0m1+LqiLcNajslvnHoNYfYBp+UAGHWLnL1GeFfj6aoqeaQlRzf9NPQy8bMIjX1Y2da3F

Dd3dQVw2DhidmAeg/WJk/kFRI2chEJ1UKraWVtfScA
IKZj1dCLeDJ9bvHU33KiYu47hkbNMVJi8YfHqiL3QVWkOTsYePYj31ArFSYzrOqnU/LXN5qAu1lL4EAl

Tu7CBl63bTvEwyKfsmh3/sFVLnLwaVDaN6pxVz2dHeEhhP08vVUzZn45r3r+kvFmieC1iYUwExPngFK7

Rpnz6S1SKadkxcuwOmzdO5htoephIZAKKfY0erC3L5
e4pCssRuM3am67devRFm0keW4F6SOaleCgWmrVLIyMmnc5vu4Wm7Pn3tGipkELXM6fI+hCmnZv1NKnaM

Rlr2LH5voYlTNGIFuU3AkOsqA3GcCdTYsHHEKNoHCwnsbz4/a24QAiMk6hwy6yx/CNd9MAOlBzQnsnx9

u1bn2If8lJgNlpGigVSn+msGTh2hLBAdq0s8kWd2WW
4+tG6RdiY4MAeJTBdlGG/pWObllgO7HqqFXqmZzIVKVFrg0Jbh3pHxNLO6GTtaXUueNGxiH1tFQcxbvF

3ikGA4JEIw8odmqoG5LV3GOk0LQh68EKAdkPdHSKhY7X+Qx6Eqo9I/k18VrtG2E3nr+Oer+DjE3kmk8U

rkcymKoOP7j6ec08ugKtHwZeKSI4n4ZEhlbwzNwGs4
+SnPotZBG48ZTeHZy0WLa/LjLuFhIaekDCKZ4vrerxCRNeY+dx0xLRiKpyxiYANjkS2bcnQ+9CM0Cw67

8W4mokq/zJ1Bg/lk3qPLbbS4EP/E7c159yzVKCDC+bgn1gxkTv4H86JhmpsKIeCVZXoulUCd2JtSjPRQ

GufgGkCu7Q3nguGALFoiSSOsw+nLxwM2LTKDmL1cd3
5GzSxH7l0jFNq858H/0I1cMOIQRqN34P9oTvYJN38JsE3QEv7e4kl8aRjoTeQPanVzSxrtRwIxXhT2XP

qlVrvKU0kOya+dZoj34tMvV2XiZAp7k79KoB5tsjruJfG3L8sk00A8Wddi+Ab7DZlptKA+o3hJQPoNL6

0OxuA4ckfH8YdZlKDiD5UMdsUzIuXQb0Epq0h5ypD3
xhix3cmEmujHA7zLnq2K+nXH9bzuEMROXW+HdKnWsvpXHYKpgi+6jMKVCqDbriuTrFrBnTW5CLNRLooD

ttvFfIjiPYjajWFbQsidct7EiVNWXNPmBjmD9kB77jt5tNtN/wD2RdbNPAu71KotoPcVdev3vbdnOVhW

VcgrpNuVa6lAwK/qXn/AXG01vSvgIW7XIWrCYCXreW
bnKv+GkPjYw3SlgKFg6weMyN7qbmtpCJ78ouz4aLQW8tyV/X2sHM1X/bSaTWC4tKB+uJyqXwmnLYGHoA

yGbdB8G70P4yeW5nhwkERdb0ToUk9/mJv86wfoL4IY6/tvwjwlbupZ9d9gtTP2W25S987H/y6i6bm3X/

XN+1oBlGzH9q+c/wAX87/HEG2v/ggx/9N+Djy4O6LB
/x/du2K2fF41z2WoYe1kuTLfdls4Zdl63VsrPLnXQvZSwzJe1BL4jaUbyAitUZAwg3uML1JlnYq6PUna

bwa3JsfdvBblbL/MbGbq8NJ3L8QnKN+teuna4AHkIywyIunB1yA1+Ntho7mhTzyTYgNDAuJV/zrzbYov

BSB921RX5l+m3aRB74s2zRwCMuhh0evQ4ezz/WXemC
4Wyr3MHutILl19/wFbkdVHluIr+xUIWHaIT5R+yQulxon8BKNwT7C+Kxf4IKwqgSNnz9bnN8EE1XRBxA

uAldBT5QdINGCSRJrazDOTSg03/KSMcjrBxxBBzP9EXgKGyGru0niuM0tVMrhM8RQB4CvE1K7MpM7zHw

uXKXvPK8XGdALEBI5sqwKO0LD0QXukUZ500L5Hnygw
M8DAY1+4YOSQkDpor9tcQqjCnUsHS21tapQhXLYLiggGjDFs1RV/LHQP9qMXioxXRFWCAkZ93UQxK+3B

1QsA1s79Bdn27ZsRRRzAC8DCwjz/TtdqsdPfcml7FEVSgQtTKMp9uxiBlgRtWtljnXX3lcBDSInarY10

db0NlDnXZFFP6sCnQw3y/dWF4SYl+9HIhwC0ZdD1GZ
oZgyR0d+IZabluy8eC2lOur7mVX5zxSJdRaO3RwVWOSPnKou22MEqYwwnQ8X8vQbMGiyY6ps6ukEHwrF

7/lQM+Y3jk7wmr/AAplCydbDLbSLw4YXSB0EkE5KXwcXYZ7foIpeY6y4nXaveVocnC8EKRIclgYen8WB

h8kji3xnImdPbWPsRJcUePPdrrL2M5BJpHxPPb3MAu
zS6NmQ0+sjrbl3bTnOdL/aj3ioG203dsS+xDBQScj5tLozgObSuPFEc/p70CKK9VILj7+LGYIKrxxJwm

3B4nyrTAE2Sg1jBFAUfqf8+wpj7s1Uc2aldeN1jVQAhi9fVVGmjPiT6m0I/fuhMLHS1J/4MKVEfUn7WK

kKmnJtnmDTjCKzb2eXrZ0xAzkyeKTE1fr5XMNMwvOK
ydqOdTECoUMHC9aDe1pwOfqwoh+Bu5R0Br+bn17j3RZ5YWxc0TGKViiyEKBRWvvE07sQHAMb4tNRgCFp

DUtUEg8S/x2qSmmKJXQ0oHLEVLdQzRmEnAEWmXnovdyPhvqhxH9G05z7AOGI6e2WazGF1VHf4TIbgIJY

2CiQxOTuGreTgsRdYW/lTuSapWM4TLBoySghFF+FZc
lY98YMdU+q0jPFLyGvA+GCp/l7Z3jJcuhRiQPDiQ/S7lb6D32owLugxC0ITJCG+RonnwrENqhuSbHRuq

7DRAw1WOgpJLMHQeCgp1JHoXMgJ9R+24uoI8z1DmQ04XTo0fK04Dowc00qziLPh4jgJePxri2XkWY9XM

O96SFWyWLn2IOEUA14lVDdVU2WsLwefIwKo8vKK81c
p9IWhAbR29+zKl1G0kufT2aUifNkc95/hzA/G6gRicv/EUByJ6sbEaLkH7Lsc6FlxUDRbvlAhNw76Noe

u40CF8rLuUZpQC+ybaN+jq/HFs1HF6rbEfRQ9zGOZs3wLQsjEC0Ft3nFX4LFPyIDnV7vA0Prq3o7BoJc

kFwx68rksOjIuF7TxmgBZE7qgmyUVGmcjjmS5vSjSi
f6y2ND5iAlMVjTgk3M1hvy4NRly3CtEPOx7NbeaPzEvRdzS0TsN9BekWJK6XZVfgjHGtlCDFkkkJGcnX

cOrdBeN/DfsEJkKX1bSBrfyx1vOEwvFTKcFUbL3Fm7bNS61ufgA5rb8YNDrB8Z+UtljLq7xFjIVapHzM

9fVU8A21d9UUkg+MwkpR2Ts+njCUJUCkQqYzBDBtuW
Tlwg1v7+HElJqndcWMtXYYDzzEzgx7Pj56+tSbxFOeke+RYEoodDyTqjNNDYXqJCqz8YfmJEmVZXzarY

8yA+R0hgxO6T9vl7TAMbmDrB6Oy7aLyGtWwDJcEl+KO6AW2m4sUtzciXP9rBYvSOrUFvMX9IUcEPFVFb

qIqRvN6RNI0k9180k0UdMiv8knWDzm9qoEXW+fO62F
lcL6CwyxhIq2ktZv2kW7R30eOBFA8h5nBRfi3TTbw8yPb0yS5+tOc3R4kQ6n+RHf71982FM/4AlDhcFa

0z8Sgcqb5wCeWBXQCMvxW8YYzoD6OULncfHMuYL04DSPa4LvbC8fPlk392oy54ttzKXOWIhTwNT6rdXp

wscq4a9gl1HwtN/dBuUtQAYnCJxrSccdyFT1Uxqcwz
d3JKgm8pb8EvUb4Nojbpvo2SFzSuXQ6Y9PGiVfKsHrjlwKWMDyLf/jxpWXWpNGrxAGBoScjHnAzruMJU

udrZxE6+vk/RMQFZ5Z8S5QmfUeoiYhZaEe0K57d1FE8zwUAfdVwa1LF3WbVzgEmWEGcIfcG8XoDvYwdL

Q08y8mC0Obmn++QopyCnLHjY0/zi4QFvVEXuFfLFO8
lpSo37VxIim+mISsgwdWi4iR3E8iDPtY1lGgELrwTWVZFg3/lhKRHIwXHFmcUG0uc6UQOW3e/CpD/Mcg

aFwi9Dweq45pyV8u+mtPTZy6nGZ6sUhfe5pSS2YDxYnzmYrQJDidZrwoVNH89rNiBkmQ2MEDzC1TBLOk

7hOxJYRpeVYuPHCQrntqyeBwRl4LxadYXDDzuv7siD
x+6z8iwP8huFtOURIlJwVty1ww0LWNHWJsTW2QEPtoRcOET0EYaIoZL9ujCctD3ZD+HKy3l4CRYaBFPp

TSkToP2OhAGUeYDhN9qr+F9wDZvNL5NK05/DiYnjttX0Wm7DRFCe+7ZGhmbztRCn9i4m7K5Srm2C27KA

IrbqXux9LoRYV/0A/EWutHy/7beS8gNDCoL+US1Chg
3lGHlDP8ZpUzCzU/+A6vqZ9WLdjPsGlVok+sRcZ/eB3UwAhxvGa94KwevpoXLAZwPSBZ6FM/minnalaF

b7LyvX8xQFk5CZyahBuQCkG2Sh3PGMn8vS0OpKqzcW4SeJjg9tflNNfaXGfnfMe2pC+BrphE6/8i5Sb6

fESI9yVXuHi+1+GQxQ3nRTsVzXehUZscvXi/zcB/R1
AYr0nlpSUoCiTgLvWK0O1bu4E6Z9y87+osSFvLp0mpNaAt1xL/5PhUv6tDt+UgEv1chKOEIDvwgKH0sa

Rw3EGHqlzFugjav7agrUKeH5BbcvthuX2UlKgEbvNO+u14mVAAc4TO7o7NIYqZih+OgpEgXS5DO746MY

AZRcACbXf7veo8ZkqPG6SQyneeALYdi79pPozIA1+A
fO9bkycCoM+f0f4UC2Eng5HeHJU82mloM5DRzLrhOIekz+EdKshyOomVsPlSqFtRFjvMQbvvGJDK/Vun

pyRFkhVpQT+1h6KO1b9Z6u+4Y2bDO5Y2JQXSz1kl59U8tnM3QE5kNfQV4QQTK63cMK3btUKrE9all5ha

YdD+1UsYOm7ZCZIEJtuugnDfv3ktAbA4cKR/Ea1ZL/
0dvZ0UZG/pji0BOq85Sk8pCpqItLE074uspazAibpbfw80SWmVLCWki2Pi5jyfsGay5Dnlfid/d9wkVY

riOolvFaZtWE3g0wssHsKVLh/GMbBGSnA0hU6LP8Hb8uiHmmubbaHWXGtAa9MQppQDNoWxQ7EDn/eU/W

n7TnWc37dve581MLaMxciT3mG+xYqIwhnNfcFPlqic
vJv3R4KSywe9El7J/QK7OLd+s052SkvBccLywPUxPguMtsNcxARW/LQs/0SFbCdhDZRr//DIg2zHYUdj

YuZF1ImFRD6ArdcjIaysOasfji5xOBRwdGSbRC/fP9nr3v6xVgXtXi7gO0lQDHGS/6H2iSQlpJE+Jtkm

7QjSUOXrr6N9uPvqPXwpoHN/Suf7w2r5LFYDE1FJKB
3wisr0wRBOVxroI0tPuNnZEUdR3O9f6+fNpAg9sdpQP7yjtsyS0Abm68pl95gueTVU9HJIJBKukdvFcq

h4gv1slJuFf9EXUctPrFxl7HKC6Z+bLucJkD92oIzdpcPS9shbj/VZOI1YKbXf9fYsSkGekiCMy5C5FJ

TZPIxdm2CRMExj5hdpLTzglDIGjNK+kTd/OJTBLnPJ
QQOw64NQeLdAeg3XQ2MZ/ZKIvJoVOpPs1jL7Xzae70osm4mgYi6e/D+ELq/7wRfV33I85aACmmGKu2c4

johV5Jv07408IMU22vzanXUonWEcNhZj1bBm9OTHl/x4lmKQ15Jv1gwCD/+fj1X6+olExYH68RoliKRv

7R7go/m9+x+3emY4nbWJCseGO73XfA1VNNl1U7KCvz
DLVdI9G+oOBJcqpk4I/WqTA8T4KHbmXajsJnsSPCxiXKc47qwzQ1LU+ohQynVLJZW9riTLNqg1YpYd6z

4n9grRgTuxizye2JcqIIS7FZ+L9EIrwt2OOd8MnrmEBqxZRYbSs3ZPq3iQsWoUT+7m5I1lOSREiB08Gl

Fnl2iZTASS2s3G9OH5j09nd+YvakQDq3r7ricEYwfM
WFiXcw8kFFSap6DwUXHv6Wp6MlDevN79VtCizM9MclCsRwVbL33sqYyZJNBoWyRvM6ehFhXQmDesTqKo

QHBmZGSmDS1x70VPPIb2TNg1ZMvVBkAvMvg9N3Qk1MqFzlLOAH0Re0DKw73JV4n2BoOd/BUgNznJaR/L

5hIfcMOC1Nct0ZpjRtKGM1SSPEOAtbRMaf74iLrEBu
NIFj50IWyEXoFefWfS3HBSsXJ18re1LevdEsMkjtApMG6BXaIK8ZnlWp7YxbpbLCbWMkVW4La5ix+JrL

yT0s90KTP91NMR2dmWSC/8v9SFoT/2iLFKG5P+DxA/UZ7FFF7p/nwn2d6940Qm6xTC0v2suBB+VzlWTu

SjZXefjy2kxV8czcs/z1MLs2CHs1TkrjLX7Vr/zDKG
htoMgKYproS/1qoGzzzdEqHvktrWX5d8tc+3Lqm1REZg6tJHMu9UATmP4QVGk5dORuJis50+zdLCHte4

RmZrDB1CdiZY0DYLfhP3OKlqiasPus23Rxjw+LG2TUkSmDTttiRyb23x8ipaeleHLBqJ2jzzwngWbyrq

hXLsfod9tYszHQZGSgcQg+gc6BFJIDDjvQttvnOX/r
gOHumZz+ho6C5wCEwKxLrRESDcF5z3CHrrVkr623OPDkWrcv39KSdUQ9KSD6qGGBqPulp167hx112VHw

oY94QOwuYZCFe15zAh2SXru+eIkR7quWw/q9EfqgcFwwqEtdr9p1Ag+JwGQgFJXoGm+6Cbwg3h6LSr72

sWb0beAeoIVhOJ80GU8bIhf/h1m6/FtkjIjE0aShpq
1Ozh/f8yhkzD7WNT+VVbmrmsB/A5Z0vGUGbdUn+1PFDQoYtjnpq7O8dBXpRP3Nicobof0OWZB3TVATMA

aZDmrW+EYkCaWb3M0oJyeBzqxTUyfBNdyQAvmBkiox64hNjzWWc7ZJp+gWD7nX43vySrxPVBBhXqpwUl

3cav83boDO02aqj2N1CfHumvx9Ki6IVOskg94W3K4s
+D0cuVygk9ftAPOrmP6fd0G8g9BC8r+Na/sXuYaC3CABzGTi4VTiIXfFC4XxbbKtIgpEH5yicJkKPzMi

giT1NsJeT+BqR8AFEU/TPIxSH5WjrSPuISokU2aamYzKVKcvvK2kl0xJW/gkuC6Z5oPuiwoQREgPgrZ3

JGuNqyCNY217dkP6DMMbb7IOnJjlTcGb5cnEBZt9df
eGto0GEuApkSPzs4TQVPW9jj+PxsFg2sI7oW9jN81+RLNXPlfDL0Xg0gSjpMkKkiPsMHhzfW42ighXZM

EAfCghPEGRnxKZJYqLy64kXD5kDm+qGPSInt24/ygXl+QshSmhXH3Z7KmpNdQ+uwCd0Y+xA9cRfewwbW

hjsVPuR/U726l8Nhtf8JLjfwDs9hHOuAX4UMGn5wtU
JPSx5sFB3Ta+PqrnYGv9JX6eRFN0E2DCrkMXYaD1mqVIGBruNx/auHsrePXipQqdxD+4coXdQU+KB5vh

hQodGoTLiThafumnJhM8rG60w6IJFOJCekD9W2DH1QfjkLqnBoEVtHhamaylmxolPiZnC7y5x/IRZPxd

g7TZzri5VLpdkJKPPjmWRuTBPQGanqrz0kIckDU1Ul
AQ6GkRDiF5DM9zcbLEeUFxwpDISnuGPdhZ+5Uf558PtFrmGx59qKUIF/UCcI3UfZmToIS3QxaDTx3UxP

pqfkgVe6JDf77RD7920OAj5F/yeSm1oohFozaae2Jka6+wIt30tT4w0ybO2Wd8UgnSrY/olDv7VY6OaI

mnR7qgDDA4NYjgqDQA/FJmJ9pnspJWMMvA2BosnXUS
33g1q+gKRjwCgyiWm5jagoxMrKTSZK1LpuDhSuoo4EdSifVlqwDvldvEJlQG7hNgKcAqpD5KD1kpl7p6

1CscWRb9evhHLbq3LNbzQ3QTYqigziEg3oHa329OLF8LHYFz1sb2WqqsQmq4psWDdTwleeESUwj8Tp7H

WIJG0I8tb0JWq2mPFjgVMxLeX+MqlQ3LySOm0R2eKK
vtdOZnXlq+kT1jmgZO8GK4b+TnJJj9q+uyKTbtIi9xmjXbtVfBzkC9ugHCC+pZfL0+hkBPVe3ug9bZXt

8EV70UmzEMbBSTeZNafsh170PilX2K4mwwjOTOzXEuNtxe5VFX5dglhKnkbNl8QMe7JXv7NkQaPImSqU

UhDaVnlghGSmo/OfVlTqMlnUAYVZyqCK237qQYb0mD
XjhdVKiXuLKaKmNKx+7e2YK9eJ4lIYyEcx3u7qdyiLWEvlbtb44lAdPRvrgGlVXyFGzNDMAwnxUgtlva

Stl3rpyOSRVUpkRINATBFgAb1ccJLmzt2d4/xZF4H4O2CO/GRUYnaQu9xVnOAPBSmck4jW9Gimdfu/xO

oujtiVH6/sGSNA/48hm6RL37nabOB95g+YtT97CkUH
y+Q6XSZu96xNsFhrpkPgxGKQhIrZkRkXukagSOcjHX1NFOX8i1tZ/FsTXz3rARw+mw/IorpwTyyMpNwG

nugeyIzyG60fQeoQUBm2wd7ygaf3ut8Ike4oceVFngZHNHK8EvweHgzPuypVB5B3YmJnMyRXcEYDNrny

lxbUrBEHbg6xuI1th8vFQVzt6hryN59ZubaH1cnmwZ
v0bnEvzxZpRS5zJfSOvSfMjb/JKvN7ddHIFGESU0SUG5J5HBRO+icIksKWAuqmpMmCZHdtBk6e2kpE6J

nESjhRgwI73vjSG9TU1oK6zQfH1D2bOihVswIOWvm7v7kLVbzdyMqppRl6YldefDJC8BIGXz+K2Bcj1h

OzqarmlpnSUnZAkanWhX5dxfLCHSTqpCWV+6rWwjmG
yGEWOsvzATDD3hVIVbQHrFWPwOWEdyeXIexhfRA6AEuUtsvqtGKL0MQ0Xqn76kBDo0HwEhoJf5usKC1/

C+Urh/G6YnhnxchGKABA9+J0z13zZi8uUBJv88TCeZ6oMGLDpNema5zQuVrlUyJKi2T+Jbn0X/j/mTpX

/4k6D/FQ2pCd20WdGI6A7M6NrB5hBEEiHsjczcnPUv
lcHrNaSUD0WHSZBO6K47xINp7Z0ucrfl/s1sJPLN5Ma3WH8wbCXSFI9ZEyloL4ASJflYtVC66vDEnY8T

91GE+uY0DZ8Dj+KLLo9inbn0kmnkbLnbdt8kl9MWesl26Nxg7zaUUOVhv1SN7uPSjRMhZxcLuvHSoXpk

Jli7nfrdyITTNFYC3J5qrQDXYk61YNWjJPD3nK8dOn
X3YEY0edBF6u+M0KgpZTt9pdoKshARxpNt5q6R6npFGLq6DS8Dy+4KFpKh7KgSQ8HTU4qGAmJJn+UhLp

hVjrGTHBqGSS6oMl9UcTUcmDjN93T2u9Ez1RewHOfhTNWaIKrCCina+FHhXq4/X6ypEv8D3bDsDZz0B7

I3EV+DnDgLCAWJtLdEhZHkKibnC8745uCyhsbmrVCM
3+MQpmt4dDvOEl/9/qXtxSu7QFPLQXBDgQiTnLfbkNpP/3387exq/aiKa9TPkOq96oRxmMaz9d6r6G4m

5aAXmHpKfGIa2QS1Kgj2OmGmivyVBdDlMYhZMPHR0ku/xTYJbRxCChs8QxMLO+G3gO5Yt+D4B5j7n2sl

zBIQXkX/zMDnJziGnSDM1M7BI7gCOlgvewuswX30Gq
qKeAnfirDOWcgPX3w4xdMo5r3fMqcl4149IeWh/VWwckHHyWBysV+mK28qYrX9K8pEUlWs7DMpCYqowI

ru5iLlQg/CnzhZPdgN7Y69ilwzE69Qn+YMfJ+xroJdhI8PRJdHC36y3YdYIU5XM9hEfX5v7CLlTN4BWi

Pq6VHU8to0Te4p/tJ1EVtzQL1/9KFh+PSPFf97l1NG
xhHCDpbmzSP8917R9RYar7Lww2DSU+qJWOGZl6YLukaMhnHlsVVe9V4VaKw2fP9ne/UC0BEdK588mF3X

zeN37XghoFrpHa/TKJ9urMOfcujrgbasjJnnmUD4gFJqOCk5wiTT22ex7iHlOECLl0PyNAVEeZkIuhUU

42NUErNJ/OyHQ6uEKkfKRBTwUf5mrkq1R2apYT5Emq
lm8w9tfs8mtklpgERclyC3N+c8a0kage+P3JwBcwBraN6RTPyzBsER6HJreb3k0b1liXzX5VTg1LLZbJ

ShSKqyQdU8dbEfo0WTpZMkv2yVJhUBo86C3g7gJLAq1yCQ0MEoPKnQC3kwDfBsE9aPguySvMr7lNepYR

VJrTBPg4it2DENUCa4GNhy+XSfTRZuGBP4aGz0sAKc
klxKXd0NR1HX6x/n10sU+9kmRXvHhatz3GceNo3NeSX3zF4+7S7vAX6xJZ+VVnkWYgBvf8ydg8v+CpVb

NIJ3dv9QP0XUDpnGMNvINmI390TScZUpVAvpzNHn9CnefQlf6AzGrDQ+0TIdmfDjyW+UEF0zG0sJnFew

ta6UYSUQ3xNfeBLKzuPCRDOGES7tj26HlPj4Sag6v3
EYmSaj85iuDrDXuoYN43PPLIMkyAc1YnXuIm8MDFOnaY5K1f4uAoS03d1h2NjOxoLVpLRaJmrDTPeH0S

8iQ1LBPl4Ko/Lw29szBNEYRNr9UxC7Ox/vQMH00tqVL/NVrv/OV+lc/0dCi/wFPmhn8vUBcz/+cWnK7q

lnObqZFKEWebEvjm8qg05wcD78UOH030U4VwchonUe
Bx2XCvRYIIKKiBIg2IsvZiabCRhkm7CQ/ixGfSI/PqSTFlNyNMQq8XlsGAdOAQKgCbDIiEp9ymF1mUzY

99hbjj58MEK29lYRoOy2bobF4KcOMqFGCj0u6ol7zScxXvljjvRUYP9o8DaGw0q1l+5Iq+Vh0uU6lpJW

UtTLtpsGWA4HzK07ayVg3Ez+i8Yb8RqCxwJtUyj9ia
583q8BRweGDZOQThR42kxHMVqz/aL3mxI/EvsKPu+jc3Vk0IhkKw5Donr0IAY3BxPxDeSwAjcGa5wfUV

GbQVBGVTwgesMzNdTLEAbeuiJXOhdaowMvaS17n5vildfE07kp33lmwYgl5BphsVsGwnDcD62KppbV73

4ZQj5I5TlznsOq1iloh3TONNvMMJADIm9Q0Q8dmM0r
dJ/DakcyQ9mBZ4PaN5ft24Tl5fG5x2GqIN+v175KptQ4jazu51T1QS+pgV1ftqkm9qCEM+3CtAvVhg9b

5LIbA4CyqKX1gkHVXISuqojJ6878B8JfjKVCMV7GGsHnqRMimeONqdkbzRHqDK832WnIr9wLNoEjNsIM

sNPyx+lC1TpJfWXxL78N2Uc72Y4pAbx6gGmluVIYyo
G2wVlyd75qR1x2K2Rwy7Yldty1KFKR9/Zm7wQmkCi8zs5hLq2jT3f9dhp0XnQQCZGWOfNfwtN2uUmI2p

PNdzuqfxJBpY/+gykC/JbB+iG6u1TSFYwngLE8ow9ceY08PgJoHWryv1hjndLz9NLa94GymSG4Yt1D1k

z6UzhT7YrOZKSKUqwECIyc+3q8K7NCxa+5lAV16X7t
pCdNGnKmbZQevmNloIc0Ko6xsRIYBDnJggqUx+G7j8/SEKWP9Mr4WTxaBFHgENSHnym504Ma7uhc+agn

L4BRVs6wDn0SIKwg62RIA/EY146Wz5vRRrGmBR0BYMEJqiAeYVrzHHGpjIwxJolGGNCTsIj9uWqzU/EQ

tMib75TuXfhyfSU4ZZTwkaFIJg+yNquCdbYCaihtSW
uuimrHGmCt9qPAdK945kufZKmG97pXldyE6kwaiInlZY+yeX0JbuvqcpJrULxjFwDEyh3n0/c1DOGuZM

D6ZKsuGmciuHE3n7hZHa/3o6TCo5rhcwVJGyMtF7X1nJ0pQ7ctAGeMVrObACn0GX0vBrcH1/Q7j1Xc8p

iHrtngRnlSIhczktaYFMYMU7sYAmDqOID2DWNZvbuT
cYBIQLckg0TwaMXbaw0i/r0U7X8BW1Typ3355ewkXObxiP4eb7ynmlOUcqGxMP96MESYhI9pFF2NSjuR

CERRc6H59k4wHbtee6jwRKC0o0sc9I2VyS90KaZsyv3mfQWhlF1q7FmrypnlpqLTgVzITaMUFeP7VWP0

WmQlApt7+4aKafn07z5cRhs5awA4+KmTXxY712IeVT
B4wz1aTGARImVUvrbgB40yorgs6tbsN3N/xXg+D45hov6+E8y5JhSd3KoYooX8Neak0Klf5dW8bXpBb6

8U3y9bL6o0hkSIYCTSXtKyEh2wLvH1y38d67Iu3/Azqu3oPPJfsWlR5oG5tECWlaxcMyUbn1vdp8PlSm

sHmfOFs81pZJd23XuqUrj8Pbf0r4GEtnztKnz4m9Ih
dY3JfFhZy1IM4obqO7e1pIwhOPt3M3uARg5rwTXW3Fce8GJraAR32dBvcYLHQ8cflHDbxIzxdzEvTtri

uVPPwlPjuuU6NIRIIsAe+xcZQmqRaDzGEEsDTCxbzj3PcZ9L0hCGt/lKKH7mZjGBmrBYXUoQXVxjtKsY

E/VwcckycAfUF71kC2Ga+yZSrGRhgkLe9WnMIPMXYa
rshk+KYcxKn2Bjw5jplPe+0kIUE2myc/DfimfZpqH22syzX8mjtIyFd815jRua4/FbyEn1gNVPijxZe2

FRawbyZgvC1dnXk8h0ylG/JPeeJ9syi7vVozAixAOpfOSu31zOgaKr7NgIinWR2lSo5oT4uL2u3wTHPF

SZkq4EwXT72dpQyjDjtWpx2vmY9vGXRRmZNOLb0Kdu
5Ni5fEoMUU+l2NenIZBEhL5vrQPuFtw66yzCrCohDQuAnujolHUTjQCp06xN99XHuMTsKyAzMHBhQmal

fmI86Xy7Rr3cx745efXm2wrp19Q8Ot2DgLXcs03yEuhFuAJ8gn2QK60pnacziVh66W++Rosy/+uh0233z

6LV/38C4u4MzE9QSUzCk6svo1/Ya+c49WDIEp0OyhZ
JbPEkUG0Ysl28EVUOfaK0pQ2fkKEfDdlQ9Y5+qf7tauZeJ86jbAST/ZPdmS7xBnaS1oRRhea02NCPKKR

vofwBa+zHleFqilYo9OD+gLa5pcw0Y0OPRzg/X2t1ZOZbq/UV+AgA0EaSGfqpoehMyad87dB6Ym8dXZR

9Te8wvpWHIDJxlU71XatVbbE649EP7bJACg9xVWP0L
AfP1LKBqNROWNyUmbm5ebuVrCpgRq4bU/AyA9Q83+fmG8Ef/iscakeTM8jlKtlNcLP0AE78tLt3drvTb

lOIPALZst+4oe7p0fcmD8emzRsU/Dt9/2kruopXIZSmkU6vm6emHa7Sg7CXeNq+xe+9S9MOhlEI9aaea

DXrrOchn62yIxj027r5+8+JfOn8hhmXsJxl9ORk8Jg
TFb66g+G+L2kuOvlg+BlS5w2ZGR/TPEjNgnCG5We7gZ0e6DWFvEu3Lt2swBLrtgm4EI5Cf2V4wYu8MPO

+d2/B3S68Pe610lvtl6sisINo+mi+5WorXFUjR+s3c1uPvnEQOgKkdHhzPFq57IV8NcOMXcW9ZczDKX2

CE5ghTC0I0/qBLD//WBHUaM8FviRctM1Dzx/z3ZSp+
AJ7+tvku4e0TVk5B+xkPnknEGvXpo2mNcr51xlnu89Yak7E/s9J/FtiBNyknIvqoaEra7GlH/8iiaX0P

zFwtrHr9puanD9OmAlTzxHIy7/17uPabm4RLOAV40i4knTlBbgZkG08Bgm1/B5lMxsj3FjNgqQksbV2U

NZrypZJ/gC/m3+N/APTMhPb/LRk1B8S98jj/58+2vr
9NIAO/oPE/OwvwilUmdX+Wx9ArpnpQ8XzX3LUU3Qqs9tqhhCIzTlX7/tFvwekgpCTw3dOprb8lihQVF1

SQEklyMV5R+57S6Y3wjVpjod8+gioUPM7aVtUyMs21VCotU5zCRmHQWbT8/zwgSHOI7t7zyqGt69ui71

wvGWXk/+rOt7+R+Tf4b3f/pFEmZyx8Poa13CfAB6cO
cvwWd7Ld8A806Ne3Zcl+IpFBvO4Yil879yoZGEHxGoFtmrn20pxCIJ1CBRja8bBScFteN0oH1rWc16Aw

bVsYL74R56xEypwjm6vG5jJyn5Zlaq7BPMjmSr8M869HE/iw3t36epsxxGxeyyIIKvwdmxsYa/+6Ud0f

gLf/WIi/1nGL2OVDo2ZMc5JZ8+dHeqfCn7M3ar41qP
HMz7t3+ldBnGr5T8oBsr/qIO4UzWtNFoCu4tXrmp+RUdaLFoqiOm5rjGVXHJPssG327jFtV04Boz68HU

D5HrSJcv0tK6cboE9GiV3erh0K/fRTarqZ1iQrijkeFjgPHCIVyfNYGzpzt+KWG0kJ/oYy+vGEgeM11b

1igRUH0/SQixw1oUcZctOJCJbtqB7/H5IvsdcMs5rk
1CVdWbxgwH5xRvsS6WgQ1xxWPfpvUHO4S6fpDgNYE81ScEctDmaxULlcbWYxs53XLtPOymbAdPu5ZZgB

hLtJCkpxrCh24c/E40QYeioK/+TnGdH/4CA1icgn6qao0T6G+gsHu2KZYynhm9DmvkL9HviASkf/HXeH

U/mdDZxHw691gpas1gg57N31JZrlyOVpp5+lZ0hnpw
2H6bLKPS3kRtFI1u8ie+aKGZ/K+lr81/1lvLx/pUA9Mv1yam39+JehoF/L/Jhony/rxeqe5bjktMcfUhDa

3i0iH4aYMsrrt1d6WL/fFy3Rj1nHLhfUAO0hxf2+2MVQ/Y3HsIdqBx42V+Ct8mPvi+fhwaUWo5Jdyfz8

FvR/yV0Oap9ucuyIuACdil0lV6vPlroujs5bTJSMlp
AQz/pYvF+N/i/+Uzv2kY/OOWzqtj6eBifYU4pswBgn/QV9Zsj8C7qvPKrk5InAQjWqRpzNopatMg/fxf

IBicn8CcIPgUdSV030nFvCEuqTYaHuEnZci6m4skw7O2hf+G/UOtt6as/H/AOm6uddCut4Zw2xY5f7Y3

r4fxt9KYq5dwaj3Ywo8rsp9tD94gnkKpngVbif4ofU
Va4u5fEZss3fC5z4X5ENrX4/oPL2luYgYxlm6Fd27KBaywJsMCy1HaQHkb0lz9y6yZHWG2cFKaEL+Ra3

+kEe3ofJ3FHUFi/ogIrxmEK9q/fpfT7TV181SEZd/4VGuDCcG1fHMaktQ3pOpgxTHwHN5ZOTra71Oign

81/9Rl36CdOtm18syLOzUj1YFu2erp+kOCOEJ8EjgI
LECqmezAaEmpxIMeW28O009r3F3ITgMYCuBcXgJnHV7mCD2OkHcqAl+2KOjRHYcW02MU4Vjgxe7kTY9V

WZOdpZZc6gdzm/LLXrksZcpvFgxOJvbN5I/09AiujbuZLODh9HjqEx40qCXw+d3D/qNY3wKQIl5r8RP+

+tvn/oYlW51pSJii/8WvynMz3E943GICtCWKkDMwcT
bhFLTfncjfSpiQXlRUkovMtmvOsCDoAfa1L8IaY7/0+X7P+p4U62ojdq35d/uPG/a+995rX+pn65uqBi

o/5ij7J8H83j7lcF+h5LXXbSdAAlEeKbat5gGCy+0V9t+FEYT+LIxQXFWEXV+XW6/5wHeJW2JrUbaxeZ

wAiKQ5hNs1HgwT397dnUqz5fg9pjergiY/7+gLWkdJ
UdL78A+KYM87lOp6oTxbDB0jXSQOKA9caM/8NWzEpgg+Kow8LKJk9H6yGvCbPhvwY7OX1Zzci+ES1I8U

lEpNulRP8/62sq9//nGfIJUWrM8qtguSa/K+O7/0N9YUDNXM/6cz470HKK11q0n02cov2CMMesZlmfu1

vj/tDkEWsSWyrl7qCRqmt/vId5WYsf6Z0yphTn6h5s
Aeyucm04t6SjqCPH+QLte8K3g8BavMt/sX/G6GEb5G5i0pIkgiyK6H07vO60A5Xe8I4y9A0hFjCWV7/Y

p3PUo21bBAC34F4Tb6do4Exl/0Jxn+zaCCMh2cCjLOFnnmr2qn0lULuPtZpseF/6FtmE97NWlGr3gY05

z/7vAe2rtr6SFgGKXd+EIA2xc74p0gr17P6q9+uEHg
XJaSenSFsUqPzFDMldmbuhc4OILIyyawbIaDYj8p6g8Gol8aCzHI6ILMZQKobXLkeW8xhWO60phzLohI

ytU1/QtN0Lv67k2TiG/+xXHh1jA9XTIjhrjknd7qmqkymOh9cbj7sBO6/4zE7X/Nnp1dtF+IBS0WvQi3

a39QncOlMQ9NPnIZdRH0Y5ubYb3Taay/y0pAb9ra84
nsXAncrY5IZRqKKrzg+cOHYJ21RD590gjfhT22+Y8u5csrVwUGtaijLskagK9F8/5XO0dlgq/6o0yHE8

yb/hp1RCjEvG2U+sShqOZY5XRGWgJZs6JW/BpBRptonKgk4fqf/+NWgdsFIPbpNd7NKa9ybJcmvk8EHv

FUlBeKoTYcgnabUugID8k5AXGa2Lp1RV37UPiTxopU
sMBHzFKX7wylFRm9LMtf7IhdApGQuGp9rJU/O7oZKRwTsI9FdE61a2O/CExUwNgI/dA6dwkOnwkiR0+k

xpbxU+duKurJWVOMZlXGcjJypjfa+fxXG1ehyGViQBsRo64s8kZg84lGI85TLgym6zZY7FjHZQa81rrh

RhEeuYsrFTbsd0L4u/xF1tVs4KnEre95FUm68pTYbF
O5dqBea2QpCE500mUDouOwUApGJqHYG2VqsbHYqAaWRJz0b7JRDwc1YYkzSYNR8IXfr7PaAfmBNVPNIA

B6v37ShL1i+GioC2njnWBk05ALIQcBNIL43p5H2QGy53nw565+HblvB9yq+FbMUT7K0npIGkBoxBTgTG

hm/eKtJGrwPkS9+KAGqySbJ8AXEDgOEG/NR9zmkMYT
kMRpLMMqvwkW7YrGtvD08YRxzt4+d0+1kA6FgUkN2U9wovfjzFpoyujlRZEqGfwhpgYZlpXlzWH+aFt4

XoTsA4Bz1MFGFw/QAaI6LlPe2BeNzKqwFGi2K6aSqEY6MKJCZyRXcOnpbLQYSRHVomIT5kAbLg49stSy

HvdiIWcAu9pexD9HnlFmFLdIoWgg0Mfo68lfV5upIn
M6CiRgLtKZfl4U5r4FbwzEuB32u0Z++4QZO5AkQtfzjnEoxfmMW5jsfeNnOW87qV3GqxNlRxzjEkZL00

KF9/zdvheOK69IUrQ1GR3O/bP8rLMUcXMrR9sgqBwVZiJTaBXmrGBLWNtHVNIYlWKWemwmTjIhkJbrAq

0ZJi+TgII3B5Jp+IRLSVkSf4vsNx7ACJxstFiebYtk
ETlOzCiNdgIhYJg81Ljj6J1vbIXNAhkb5qGnK9+HAuj+7YgMR5+2F/11kHffbYZNaNwF1g1OigJetP73

hZKj8pABmlRYwCJvmDrFncaiSXKSFOBKy8iPo9d/ma/qZRAnYwRVVO8Aj3w58D62rXUgnLPbl/4QYDqS

KKegk7Y8+67/SnM0YZX83KFX/dQhZL26xjmO42wIep
NN7o+dKJDLUJOnTTu6mCwgL7BFcbPJ9uVeMt2w56QsJ+UPtRKTScRKCKJaruH1/DKk3v1gxsNiUDH2EC

XsrjYOmJCKJ+HP34QUJ9C4PhGm0WDiUZXR0zcXMdhLjbGLhJGUmzeQ7W4thiyO9dkuM9CgXmjyXlOxPr

QHtKqnuwM2rvuoo3aLKEyUPIiudYKezbQmQB923AkO
Q2qmZzP8d4aExMBaat8MRE9O//N6SYYdHZMcx4Gw4mddXjn23vcXlqPFQBu6NXLxBK5j7k6tb+dkGDSP

DBe5QGWwN7wkrmgWw06MXcnRTuxMKZtq0xdUJlVs8uTW5gI4zvtCLS9mHT+jWFUcoO/Dj8lFsp0NG3s9

sMc1yTNFa4QKIsbZ5rSFnxVTAF20e9GBr1nZKjy2kg
Xj0nZAzZReRg7QF+Q8iuhgVkl1pqJRSPylez2YQNk+kESEx6ZvMFNWLkm1LHWdacRG+jiu23EE4yFvxm

dXs1uIIOJA38be3eCEMHsNvwrshso8n7fQqLvm3Zlq6/qjNwVVU8BYHkg1ccpT2HeB3/F7c36Y2A5+zy

Bww9LervMStKKuR4O8XojwiJm0uyE7asN4xxJwKBZv
pAGArPVOo+aCd8vEQ6A8wKCbwUgfsPQ3MNm1GKejrnse2YHV1SeRVnWe6xWptiPTdau2kPUS7qmoOYTS

tEK9pQ1MMgiwkSSDE1HYCjc3ksWaiUHZw/yfl7bIr3l5DOgZHFR0CdA0skAJva+7+XGoBUeiKMVPMP4u

JDFlSbeB4Cwsd8Jq/5wy3bcxHY6MkAEQawGiOxy1MQ
3uR5wWS7ZkQspaoanR4lpw0Bgy34hRuc0aOnDg5vqabjRax4FcL3xNQiuoKJs+fLkkbRiY4q03jdZ1XT

DIpE2gwl19rYcduFo7VBpThSDvYDVkrQJSTmf1VEM7MNREZrE9EveJnTwH+JLycW/XSUZbFzzYJffNo7

1LQ9ugh7sgPvo/e1wY8Pc1HJGs8ReoN/eFueU3O7Zj
LF+7nd3O7ocqxNKbAKcheV6u+MfG7UuBhMaI51DmD+E+qZEwunQtydmt9s3CeQWnEB5ctufg4mz1F3X0

ueCINg9t/cvpi8hpvloFrV4Yjyae74WzlzaEKe8oS6L89rMHIUv3uKG+9lzP55plrerdOrgG8MvA2xej

MbNs+SZcJuv9rDTtYELC7MIqQkS/CYTdTQzjpq96OH
MRr3lAorcQxzSdNeZFYkcGgsT9Fp3M3wuM7WLIhhra3IFsM70u5+wmw0OMxAAJI5gbb4LtWViM33L8u2

lMNh3F82x8i2+CMFQTkz8jvfA+qq+dUgfOXunaD2zW0xCIzk50JybHRNdfQJyKYSWh7GOcptpj7YMkrx

956adgqBdq2VHrk0VWCo2YJ+ANNghvSoAd/i0TX7Da
92dezVgHkBZPpRi0cg4YwL7MkroVYKJuUN9eCBjmEWi3lvo627AlVoONyMZbA5SllGpzBBwCc6Z69RQH

Ow3jQx9zvhDIc5gFTo4qkbkdSeQEmjIoqUKwWdBshyYiiZKZA0yMKessVmgCDWo6ZoaP5hhxYbz67vQh

A6HFtSkxieIqYS4WpcF1JvXObVkQA2aWw/72rLOZBd
6XR1Sp8wuMn5/5x81nGwC/ND0NfchVxUdDigUUyGg/q0sAYdJkQq0Q+YlKrTsa/андрей/kgb8AYV/bcUCr

YxGni1QyBBpCJBSeDn1fqRCQbMGgMZhSfg48WytCRwWnyGQg2S7273S33fycBeyb/WN9/ZLK2DsRmUSw

O99s2Ju+AB9U7tKwabta4d/vXgAE3n/I33cjhyLBlt
YBI36lnKTYvkEMUtvGxHjYGzpRTRQNpK4HviJhntIsOJ3zxLB00rib3vURrpKE3gte24EnQaEP1Sx7tb

hn32e5IBCHsS+6Mq0TQaj5saWhHeFmnnyCdIQAebcxtS7sl0GN8YcN0fom1tELLPGqeJ6FDwQ8hWiEqL

8z7NptdDt0Eu3HELygLB+Lw2975Gmnv8nXpNv2px1e
rx6U8YG5xpNp5QUGDFdttu5DnhVFcm7B0OXB9HZ7zk4XXLb5VcnBB/l+4xF5LdeUaLe6mKeqF4IWXj+3

7a+a7qzMwr9SeC2Qt40nHRsemEATa6o5xgItkISd1FN6HlQMbjbrpHURyFU/E6Da8E/NM5xzmBH4+ZZV

XuWvMMCkIkU8t9hEtPSkLZX8kz3fsCuHwqINUhGgaD
ZynIGcBrach7RR5Zm/qQoyT/u0rT0ou5zuZC72hrv7aeRVFEl2tM1o/sYb29KiBQT+WYfuwguVBHYtM/

x1KamSidUjC4xlBXBxYMWw7Ws8YBYzjXDfbV8UsWrd4H7aakQoFxJoxHEU4urqCaiWMOeEQxCA3KTItt

dPFnbzzQHD/2JwvPFpEPtCsRqbmO2ryVWEM3Vw/R7k
WmYeO9aC/clnF3lITEw8Y98/Mc+uaC1yWix7hk6W60EecUnF/zjvTxW6NHHT/+2SzBiGFJlHLdsOEwpW

2U6PHtxAo/ncBhYT8s4QOZDg2CjqxyGty1CUbIT+gcb1j0j+zHbBOHWUoNFnKh6jHO68ssEe79L/Dio8

+1eePp6W71FVtzkbPGyL4KV9JrLwaQn7ygYas3aKbo
FqAT5539lAgT1hS+dfZ7gME7gg6RfuNqiCkk5AYDr0TPOn2pEsMa7P1oaavJdfjP6JaH7AcYG2kg9bbN

eBrjnZ7+1iSGq/w/aN2b29S/NN+M+iFS8s8XBytZwEPNjvR3b/f6ikW4MgaDtS9NvTzmRvrPThA8NboQ

vq2ZtPRambxZFvgfbVmTvmXwgPiIJGGjwsJFdyBbcx
Mb1SLRLRnHVGWkDXz26NO2o83vyS5y2dsHhrlvlRchyWVUWvSUkWR9dn3G8r/Pa6dd0/eeyay/oMDRzd

NApw5oSIIcTwngjudgQ+BHmXSZZNa9eQGk9bvY6t5fg3z6aid0NvZq264u94hjXZS7syjrLmYemwjg/N

d7WfsqQbFcFk+UJzUDbacvOrO+6Ey2c5J2ryEIXQZG
UPIqcgtlvY0DhoQckf72nITbaWRYG2xUqUusEWX5mPWpJLigDBk0L/iP6kV3fPY0fhCh8SOHZ+8LN471

oXiTtbbFqLUjUfx2lCsu8ZPpCmdcHso4UskzK+aA1TosjzQRS6tT/cJMbtwLEy98iw4bkvNfxtwMF5WP

sQdGANFURomAbWlfDKnAp6KkHL5HyeYGrcAyCcTGrz
/Qx7BrlfP9nAUc7EAYEY16tB/TWqDL9pXOs9PTkJex6KpDLN5QcBBu4s8EpRgAcklQrvqfKkYJ7dEPyt

b9mWEqDQtG/Kb0O1VeVvjOzFYDdyWyYMx8A5mmG+DaUngQCsSWvoOJIst/BQICdV+b9wft6Cc7tlcwgu

+jvC7kFjiyE+8oMrEUOQCHtkoVF6xnIzpUznzeweeD
z9GWcsm1SxQz+qbOmHVit+ZnLzz9s34o7LFNatwLiMtCL+mRJqWnB5p56LedgYL9mkKNU4ZLov5IaEeW

kWAPv1ibJc7mYGcut0aNySmV+wfhCAQBukDqNvolue3yANjYRuenjBhImyCYCw9MW6xdBRAaPiUlZ6H4

lBn7X5iOZ4x4bxmf8oSGu9Q0fjJkn9a0P18nd80VD9
cE6j5gh/miqEDsqKCpSaWAJLyAdHkkuJ9ubL+DzbTYPl0xT7OFklaNJRq9vLxGisOpotQuQsNONBaY6i

He4QVrnrkLw4tB1IiLnOkXSkwj17R+sev5XZBaYO0DvoEIuK0abKXIIYS7WlUR4XgExQMAxp/0rS+6Ug

5Flk+gGD2slO2CBLgZBa3miaz2h6UWz329++CENjXu
JQI0fYbeaftATKrAHY6Jule19GR6fOP2ywftst9YSAnp7Pv4kv03G1KCzXuZMxyqmEUM/kR870ZsT6nl

kOGwgHu3wI22NUgV03xAnClzrcWB0pORJmbNU4jmAO0Imcev/O5MriLAKP3c9tERvmmO/bNhG2UiFI5f

3VpFVovbb8y2UHyTptfQGfcNwQy6PFfDrGVsMFkNg3
x0/ysDo7KAMTll9DNznlZJXbldvBNawfv3rn7/fP5fTubtO75UZrOeORufMiQgySSzNaZ3jsXQZY8A81

gC8k7OvvpO+dcg+JZc9O2PXafZxg3L16SnfFBW1e7NCcIIxLsw4SPquckTt99yWrOpMJirQIh319KmbB

v8JDrpLjnzOAFNtv3sBpYYu1Wd0N5iVJgZ84migTtZ
BcGeXtGM8leY6gidAAsjM+bx9wDs0QgAdVxKtij1bT0u2Ja6MgluU4kjUMNJ9Xc+NDRYjgjBsZK+VIDx

1X5mlzQ5iwVQ9hm5/hgb7KGO9Kt6qyGjZAjzpO5xG4vhbRTBzLyNKJNqh7bFgV+DVpzZseqF2Qcgy+Ey

lBst5eTOalnsR9TeydUY3CfJyayz5z07AlKqyoxi98
fPDlw7uUD3/XoQKAkbcCzQ8QArbYvR7D0mqSILMmbmlFR7hOwMkynn2IVuenyirNDrUIQBjLrUj5YHhH

clL5w6ATLDLhnaOr1jBMtbwve7XKdgX1AI63t0a45ZhBIHYHi1IZft1AKF/hNmzbmUPS7VknB9acF+Y3

oHqpLbn3hOYCqRCsPHeL3r05iGqQr6qQeq3kHtXPSu
Kd0+/GHnYT9HLvPrlPETCQjUx0xPeNV5Ct4LK3cDFSNDYgYMpDJa+51OeeOhexFed9lXX62X/2uOZgE2

kxYVb2N0YaBg5ozni+waBb1M1ZLZPMY/7aJ2e/rS6LIigPlGfTlHb6VxskznGyqWpGFZfj+2ili8md0/

E21IdyxOQmfL5pcRJvPcwNSUrSTPXCLOsnV+DOLBum
gHG+t/yzNveDrS/rHEz6m6QPYTJ3QW4wMZd3mbS4jF42zSeci9rRH677XTA8jH1zPpBSQ1tN1x6TV4tP

NCYS0HuQDuqxvsaOzC6Dvh/R0BaEu80c5DQMZOcTpdlREzJK52U92D02mwlqJVNYIMU2nj75emHJT6fK

xcf+wKUXVAH64xUUPnf2i01xJyNUccfvhMK4sSvvHm
RVIGN5ou82UW8wfdfVkIpncxo6yu+TRjRQ+QHStSr13hqIkYgNWgkdX5w3vgtqInPe60zhMz22AapPXf

tFfpzs68NKCetS8+chwZ0OImR1MgnJiK2AAx2gnNTn8nsVE3yWb9TuEvl80r/hFTLR65muhg115dUh4n

8ydZtwwYziXxeRb3dTEukd0BykxKEgzCSNdBMHPXGB
0gtQzXJ17Ss9+H+jrfCO3+r9VEg7MHGeW9BtGV2M3Y4HTdzzW0Ml+4t/qXTOGHmNttQYj1KejM7oXEzb

8FiCwbrEbUEedqW+G3N7OBImHZUXHL9vHDXjLv1GtjENOo9YcdOEQgmWGUYeHuBlRUVyDt3dpppApWu/

Xo1Gc90MTxHMhalj3im1LDcJv/qwLs9vUDik3x6qR8
3C4vki+hiTNXK2s69B8Cv/fm47Q8t+r/jL65cbO5KvhlXn9I+YIu4A6KQkKVhzRQI2u9uJoOu8MJsimR

g6lgbkEFj4utlgf6fAhO3BVomrssi35Z/j7Bj4gTJOsTe/DXf+RlWh0z4CSd2t03ubaEiVYyFkfGqpRX

dYhT3y8IR+ddl0Y01VvTk2xXW77R/SWcv7tQaKNkIS
31GiCLVARC1LTL6rESCJZmOh6EErz+p9Olw216+7FOwbvLgErOOh8Ni0Bm7LRVgSfuK5YA5mEzzowgLY

jz6HOHcFXxood1qiHW/jEoaNli/qvn5I2TbZDXA4oS6Xpw/XBzkbVjoD+V7q77ybUupg1/1xUl1SWm2L

w8X+stPhN7fsTGcEu4gCRz+cQx8sfbr9GaTawOfgWT
CFrQ1i7YY9XwbUpJcWl+Y5OXeAjMbsG4U28W0OI77889YPIE02I4MrVkEg6QRJxH3ROgURJEN96KgsA4

KgIy7f744baPWShAZDajaX2d3YyQkkZY0u3fT2YBPvY+y0ypEMdCl27LcE3BDbdrm8oS/4AL0kgs+pcX

9hkko6Mkrht4XNnf86X5F6HJB/uH9HrgzVyXY31x+6
h/IeWei5W+qaw20VvElKoyBYiOR4/IeU8VTdUas68ewWzoTNogBQ/lYy7hVIp7Hn4ypOMdDE9NUiCl3E

jO1x1oUje9gcaRlVMS/Mb54yeAtP2IVxufXAppxMSn+5dZmX/OaMjxT7I8DEwi6Q9rtWnkE2wW4BCMg5

Y/R+i6r9KQwaCBo4J56i63a4V952IJs8/lucía+tVP7oB
kXDZv6r1wZe03i3C60sijoLqxBdG6kwh8CbMydrXInf4Rn7z/wHwaEQy8xPLRx/eMQrtrZDv9+lR/1zC

FM9WF786kui2Dfh05+M/kWr+q9ENOYf+uMsB6J8mbf863J/PdVCg/PT3lqL3cyjHLF+eM13iG5ISFBuo

N/4WEA/+5QrBZX/A5nfwi/PnPmQiugnIw8m7z/8BoC
3vHxz/XIGfvjIn8b23+o51adyGswSBHemwVba4sT9MExEEkt53ClF9HVZows7ZrYj5GUZTiPqv9+I8F0

YiqShVLIcexS1GIe2EyL1nbCJz9WNl52q1JhQ7/HnTgtgGfg0WeEhwx1qSK//3jBNbvL3N3//wUAB5Co

5//eYCR8x3p8EvcV0s8Dua9/RmNUEoebluwh+W+9u/
Qmu0/zat84nY90P/DCRGGtLIKK95Mu8hKJUi9SmVCDo9r522O7c8X0cLxuZbIwY3af9jTL4UQena91ob

ci25CtBY3fNpfHcmz4JI/0Z+qe7xtkM+Xvb/dObU/UKQAY/kxVEC8EGco2HI/5F6w7VCLxNX+w476V4g

L35fmpPyDdrks3ZVuymvsNQ7w9pJ/qjbtbWu5ivyfV
vEcC4SSmlAZFpafNC+N/fMyudXwd565Wa4RB2n506Zyy06aY/Hlp6dzv+ng2ckS4W8zdv3b2zb/rMjQ9

3x0bEVfYnTlrgmw7dRP8piGLn1lzj9sJr5n33cIsy2oh3UndMWbC/12exLacUIh8GFurqo9vvy+/EeHs

YMpt7q74U+1pY8Ss5g2o0QsZuBuqy439DoGGvRl60T
rtB+AX9VuQiKkuPxyISEamgJ/7O3mlqy9EnWY5WWB1ekjj49pnPnza79bU+W6GpKOhBCAjpN04XPqmin

q2pZYRGup/nBnlPRyLu3Lixn3loH/FWE3ZPQgekZvYFlIRxpgYF8A5qk1YMv42eQo50Jv6sSfAtlMnmf

rQpL6AYlWkkWvLF92Ly7QbYt3t+sgmTRlyzuJxzMuX
uOdue5tv/llaRxLyAaXxgr03dz+J90AfDDCMzqes1zQdau+yTtzOrwMIFaxmY53MwuZLADz+U1KIzQgb

8lTmXl3RW/Fr5A2AhZq+x58OlAfIUXdBra95CO7Bk2FoBH4IgyzvqKVlgp0gISvVvZTwDACHK7597j6o

G6pgSeg+WPymC4Sl8Knb/BBu2PjNmWeB/N+ggG/283
5XnKAvd4qhpie1RHpJjw/sl+ioIci5QmBb5zcBn+LnUZ9Uaz2lvSlHeo/0ID+oKIYP+ad45+Xa/7i9ck

ppAQqcaRlZ7vUY57TyS/I8oUcYz/O7ukK34MRT7w161Iq6VxxJl2pU4GFmfAs2iqNhoXThBH4rxiGW8h

F9mcDgN40IYEj3SlBRvZ7qLGc6DRAaztDuInrco8iC
ztifedSzD/6YU/ktpcS5MtM1uxb8hU03KmSCca6iW4qwipH2fcWu+6TdTv6/jBweROZkZeD0n8Eg5lJD

y3nw/0OywPkKp4bj3blvxV4R6dn3ErG0rix3TQHDhCQ83L0avU2E34HDrCrKekhH+k1K9vLD8bU43+6i

e5AoJ2CL6OS14uRLf5jnx7R58Xk5mj1/bx01rtlO4t
lA8wOrqD9wgDgGSw0yWdb+8SqiRrpI/hZCkjFN1qb3d/I/cKtVHzNryLRLBz8uAFeZkMVwxfh7hael/A

lkumEbAbeQ3mVA5et07cwqLU0CjmsVLsqowI7ug95V6C/AuF7sm3nmn5Dg9Ylq+LCu8EyRGT0Yv7CSqX

0eIZqfZcboE9bmLUmyDgJhfg4NGXmH5V2P0eAeWB4h
3dXGmxuMuRYsAb6JSiZq3/Ewi/u+Alibyzlk8Vc/tLYdeL9pbkJg5uor5hYk4u95gZ+qmEM17+KlF/PT

baX2HzAq1NzjB2m2u/omndpQI9fy8UswSk1pgxkFfmxj297NUEjoSXaQOVf8id9mMUJtehVnc5UMRDV3

9QJK//J09w/343qfbMTT6x757qBwmWl+ej+i0/VRqK
DxZPJmVVVptd+7hC0wIuE606dhXrXrVBE9XqC6hGGlBI1uNgX8qqSqA4ixU6l2mgkZvfjZ8KsrUU+Z/X

7msWAjq3qZNuYIZTxf7uhABOiVp8bSH11fR7Yj44xOo8gEVRa5c913GifF5kZP0czgShKHa9KO8Vy+68

eZ++9G0r4goh1hFBRIe1ldwI1LejfFz7xhBCUZFATW
ZUYey9WeDoZV+qFc4+QA8RXSC/+Nu9bdVIqMpPfnRUBrm0Wt14l6XUoJ38F1k15qNbzQOZ+VjKd2St8O

zjMIjCjCkBKLyQh6dWCQdhpq2A3I3nL5kdSxHwCOxKLHPKY4ndS8ZgmCl5HrHN44FE9GB8WM+tbhFN5U

Ux1vdkgwGG4CmsSQKZHdENkFVP1Eer3bKh8GAObyiM
MtzZ4/eAfrTOGA680QT2evSIK5Xq/x3rbLIJOXMIG/1tT6VobU83xff7Yuwr7wLdl5ZGe8iKFDPj3uFk

VneJ/R9WR8wOPTVX+FG/j4EbDEiki7vYpibIiPFntstbxsPUFz9vF1io+mrl846y6gDt5SufIumcNAKg

IxkR6qWOEAEZDpNLzAmfAOt1GKVYGSnh/TiHUJoGz1
yMp9qCkMWCEGzelVMc3MYOWafIcS0L355KK6kMHrG+PqYDFJdIUMeiY+qXwUKDN9hNINm4B8IGcDPLHe

o75pNIgfKCAZGB0WapiPCUL07cWmLS5odT0wvWon6VZcZglrP3NQoNrdK1TEJ7ArCCJzyvjEB6xojDZ+

4HJdthhKvKJ39iEtaqIomzh00dfAf4/5CSdodJG/2X
R3dVbySNkU2XU0V6yMVCQ//+PNIRsAhoo3+I8ETuxGCnNlB6bploYKRA+yxZHwf1XOktAVo8deHdvgAm

7pYnbyR5SQkyBZUAlIK3GknEP8nNtGCu/BXUgU5LeR//Cw90fhvXhWQAKsSRdPqf44I2wQbp4Sjzs4+/

mpYNlu+RNnm0NZ47/NKVhrOUZaxW+eCMfVxp9ldEIO
CAJSa8K4RNMAApk5LpBX1Rt5m8/QTwLNs93QdmZ11ZVcj4ZCyRRskMu+pvKeWWS8A+qSXEe/HZtEPHOX

CTypHHFMDVhrF1cXRh3nRmi4Wbke2asbbM+DH5zQGtKgAHFIkBjeyGAb8cogDVE17lWkRU/wBrmPKB6H

cVXtNxLnr4ANRj7OwYyW6GenAOgm+d36YWFcomHUs0
6SEQsvwNI7BNaZDuDswH9UJZr6iFPkhrffqVx5AA9Vj6CZotd9k97TzJG6FW2vI9VkZwL9VToKp/koza

OfB64a0HZRh9GTi7OtnUqzFitDFaPTauQy0jFpTkMEyB8e/+sSJG48YhPhov2EcfPRd6ZQzC2YjeqeNq

nomcYjUJb5AAXP2nlhVJz6KRnEFHBC3MLjUJccfHMD
lyux5Qy02WRqXdgPI+U3R9YZc0NFOdkyhgoWJ/yCQkTmR3QSBmszhNtLvyAZTOp9Qgerk7yogeZsoz3S

65nYYb+CpU68/dZAMqjxTaQ5A9Fzw/pJ741/bs14td2tMPCYtrMouZQ8VqR5Q67037uXouNmSiCpK4n1

l+C8xxG0dppadjqVYPeAi7jYw3NgzPSq4e+ZOAR41g
Xz4HEnEpGxraIiuM0eA6HTSBi4ESwRQuDq8qKQrsbKtNmWD2TWYxC1N36VxPKG5mtUmPcZY4B2bsZA9l

59RnTPrhEXxTATJHn3I+geHsijIhpKVNjBIz4PjgbIR9IfmV4eebaYT+b0dBqITEM36f52QmiqdpMTYi

niNFz9tVRdYC/qQcMIJl7znLGOhRpae8i/QqxWqcHC
xDH4ODT1oKZKSZtjysAOXdMAdSiXNRY2moaGBFTgLUbbl7070GFg6KFVb6Beyiw1tleWv7TNNdYBIPao

PuDamTdj3M5IPFqh6zFwyDhzAQjqfsdStb0Dtm9kV2dIRJyCMz4UmUyDg1hdAqLG1HXoLDZX4g0qSZX9

LRqIaTdLWkEa/somifdCDkxJmTQ9EkAXWN/Bnhno8q
MOXn+9CZkbLMj9/zpJTbAym5xhcdr2HUFl65tnVG6TXLXbT8E2f8idhu7TS82OH1+qoJoKlmEFcTYMB8

2n/YhBr7LmryzM1DeqCf2/udQamuwTubGjOYDRIRsesx8Sgd6yn45hdjb8xSt8Lhj1MkhoDNWbdxHkET

9Hr/LkG3s+g4ux53Jck9RmwhisBj3xxaTjkUd/f0PR
EvMLTsp8r/hAQSKrif+y/ZWwmz1yrcbtbZj1EyWGTwxZTCQyFz9w/TwQe0GXPTfAzxCj93yOryG9bbBD

/Dwq//10uvwGRtJxC47tR/JgJhjny9k//rUx+BF8F20JaKoxVvabDZ6HUhRaWeV06IJogjAfNk0G+ZSj

eufUlpKzs658N4qPtglA8166I+T+wlkGtJkRZWslq7
vdn6zO7SG2c3mx1SnT22oblvZIXo+TlGLTEohKiJdfSBFqCysZotZWXvIaPJ2xhUXyUtH7EVD9PDQ4fY

C0qGfexLt/b7EWo5dqP5rag37a09uGc76YlWfNSh1QdhU2QNbNyo8yHak5p23FjmaA1vMKGtHTnbO6sO

Y0jPDbyLbiG0OnhhMucN2pr1tiORL7YWVUnuGVOzHa
aPGhPkct95zBymzuqgFehfyA8LM1sf3cqPnrMSh3WxQWO22rYZHkDSyWzlDRZaf3KfjVRllKq8xHQS4s

dvEcljACNWw85NUxdVFaSQCrXRmczbaXzcaVfpq/AucP43DDOxo7R+EUUu5NWAzpOD/EuWFtRyx/WYV5

zDM6YFOud9Be3kZt0WcTQ9tpush8J5hHVwQM9K6Jkt
h8+KaRFIQTKzLg6Y51ge3A82RdZ2eX4BYe9EZd0c1z3CcbkxhNvrTdU1iXT2KJCGq3hCJt3cVOyWF37B

Yw6tprOXS3UM/zLVK04rTLjTPcXCOLayfgqB0ObnphTqiJumVt1mXMxfX4rzwQ93JCrWwAn8l6U+eAIs

puk3SacqtCaiDeWY2I4mX/tIPYMqQWt6NAUUrOIedl
n69Hr3GjCPLmmyoU5tGgYuj9bbyXirpGlV1YlrH3SO1U5B58Ymwlms+g7RuZD3H5cVOAL9gr6QCcdg6X

Is5rc7fNtlIAEFiHn6jghyrp/rzgnA6arK2iAxV98V+ITSo11pcpcW0oeCJheFlX9joRN8Lp9bg0YmTT

hGPyI0P2LTdp9707j6W4OAoZFrm0rVlRrwB5Dd0H+6
1Idq6FOu4clako9q6gLpWGaIe44yhGvekvcmO1wVOv3EYGtfj0MsOYU9Y16KWkTY+ctsTY84P15PsnyK

efmscLGeFCaep8+Zj6BPAPQir4sdUeuWUGO0OUPL2AWwNXpxAX0MGebbEgCTCrsBKRyBFCkAbbHPKQ1J

NisTXcInRK4U59JJGDXh5Ob9Z/YLWeilPvS13CNHdA
ZH/+SWETqmosfdXd/obrlDeyTQs2gUmoDZ2mv2INKkQxPbBOeVERbSG2COhxy8quKFzYoBPjbdBV6PAm

82NVCc7TtECxA8QejVGnl8Fj/dZ2EhNcN7h4xc6FRVTNrgxVSHWoOtgAmVPlzo354XNAkaW5hITbJYXH

x1xWuL7sIWtIqnGWcxvlbpUsUpupSM0fjvKXpZtOFj
lyEfKlCxQwLezJDJjVZmozFbRcFFZisYm7P5AxXxnOSj8ePkN8R6zUaHgDLKm22XocSNT+ELYZxnETch

AoX1GrcqUFEI9JrBNdgpCET3NralHhg2WGlsVS4k4dIxiyPDsliqDkJPMvjzI7mb5q5BohJ1RBX6+dVf

eyWV8QpWpXX8cACagkc8Gp/8Ql+w2VXMPl7IlryctN
r++AVl38kvLopLd1SYPVXSovOT/UyvtjYm1e5PCcPRj+2DrXJZ6ODh9YuGOyY4w1MuRQWEBtF9kn7UN6

1pyKsivm3pvMDKPbJ/WpHWgEIImpJGDCyPfSufEl+Pqpd+KDlYdw6rcHlq8YXMnrUdN7Ajdd20mVHJLM

5LGNYq6ZpKv8R6EjVnl+OMb31z7FvTFyX4RqSt6K/o
8RCp2T/WMaIzin3MhxTqPHnQupY8Dz2/bF7Xukv1JrvvGk5ObFpdloYqT+vmcnbQmOZ/+xhFH2reGGNV

JFHrw8P9j//yGlWuHHk814iVujgVy5R9EsRWCJrugIQax6gt2SZj5hinrwl7koE+lDW3IBgEU5lmn6tX

DB2P+t4tteNRgBUTgiDSa8ZHE3LI4PwW2sT4BzDQPT
4K7dJiMHWyWGubDyodSp/RmpU58A43xTr63UxAI7zQ6ghyWgLu4DxUeEv4/ryyhBYc1xSr225qoBWyDh

U8A1rkdR1XxQU5hJqBoo+5uR4xvkEgcKrbltTBFwvW9eX3HnjwAm9q6qIOzyhx4aYScu6Ygg0CZbZTnM

RCS0fwBuNlNeJKCxTIx/Ih+bfsmW8JxeIGrHCE6PNv
c7EkeQonWWiqURyggqrAPmxa4onK6ggoxNKa8G6grI259m4vs25co0XXqArnlmU4QrkPdFOybChTzY9M

mkN67tXbiEAGEAoBj1WCdJUUXe3ZEV9NkhmNK8O/xeI8LTEz2oRXqKxvVZ0Ld22uRnMZFpJO72uZzt7E

araCM2kpGlBP1LDAP9vz3wHJFzFH63s/xJOWyfzkT7
c2+RfwQcl4RIloaTSWvv9c0gaDc1gfds7T6bLwHy+/YeXs7nelHYKtOWlInKFqYnIISUv6SDF1TMwdBr

ZU6lZieSosC67zrLd9FCXtoxiaPsQ+8+UDFOndo/ocGyEep16IE+VqFEN41FI5tbJ45u5lQ7bGd3t9wo

uqC5/SX47qu774cITjiZ/Fsju0i5gGm7hoBUdGoJkR
LqAVld2+Q+i1zeYzhgnCaM31dqBm+OOHqLmuDR8C+yJK0Y6Jmr3ludzlfekUgEC8dInryJjinGw317te

8FmTnUz0NTRHrZQkMZtWg3wXgEphrEWXtSu6MJCP1Q+1LtwcQBVbsis4RXSW4ZqA27Dt+feudI1l099T

rPoK6t//kQwsF/D/8vPT//63/z/rgaD+ozoIKjbrgv
ojXmQ3DmSF3vbQvbWfw1X6cUbPY+teUOTR3Z1XLXzyvWf1RQu76w2tl7+RzwtO3I9j/4TPholng6kC4q

cGrI6Y4LpZA6fwP78cKKCvhQARfgaycSDQGYfwaZSzYA6t67HMPVHslNJs+IoZAHxDo0/aQgdy1FsVDD

cHAxEKDxG4UHa8m4+mMamGQ0CzO/BLkwA6Y0vO6lxC
YpFreB0QonzpNu4wmurs3gtsimEEshs6S6TUUba2hgly1J6SISS4Y7q68JWvCv78AK4VvbbI08vMJhI1

iVa8Fi1ZoIEeoWO+mtOnSYD9xwtYqjpBYa3Yh/tEUH5P6SWxTSpVYfqV+MNmnKU20gLzcUbR6IuzLBqQ

PVBVQYCi9JsYDynFzT/UKxxTFF7VRtsmGJP5IvNIbH
Nj52smJzwhsENPDq1UmYFdZ7dQu15KRw8uvG4AahFnqu4rY9+5aIZiN1LnkDTBjOxuCSoCx5JL438sWV

5EMFdTHvTQhSVFBsOyEGzWJBhlrojcQtSs6r8Zr9Vl5UVCdC6s7A4Jqalm2Vfe9zMT0PNJftHEgDzG1G

2YW5zT0ViXttdH4q2b7YQDgSQXgf4o20CBjDACpM9A
8NFpopHDKoIMgJuwVYvkZ3aJZb1nPuVSEsH+wDdBBr988K+JCqtPKVM4bB+ypuMyNDc8u438IEO/jFww

QiuO1aDY68fF0wIEd34UOZ70r0UOap+8rcyDKC8BMl7xPZ37PrPqABd9Trc1Pu8HjttVJ2nmnCXGDeRh

OpzpIFkSpc5HgMVh+SLJ92453JWDOOU0yPAtl/NTXa
gIebEQcLj2e4OcSmvGG2iE41vHtvdT6maPE1psCsmW+qr/3ppacMqj1vB7VinxQtlTO/Duh+WvJWOzvJ

12fnZ7NQAP8W8OuVgxwXaD2soMEMg5JxzPUbBoX3/PR40r84OFXhPFgc/lgScgkqdMyNawrMvNID5Xsv

5F/TDcd4O5BrHAjicY8CDbxMA6ZIfX/HWKndaAlowV
R1RLRZUQ4XHUAGKlFokLff3mbU8BZlY1smySyip1sP8ERFVC/54kc5Tpyh80hUSx7MJggkfpcHQgWdvL

UCNjama57ATjRqUvwapNS7LQRVdqV41xj5hIs+uh5ArnrNlK9KpLo641+bVQUMnz6ujKFgwTLxMmjpcH

0eB5QBHXMr7t98u2euymsS+9gyqR+Kg4wC8x3eBy5a
8yo7IJ9K6npZXWH0pI/ggncSBUBhERp21JO3IRk5XdwdA+idTBxrtYuGliQftuSvbG8Di6yotMvcKu/2

a18SvJDshJYA9gm9sOYWp46jZdJyLcBdw9dBPOq/oGfEBVxcxfOPTXAgZOXIu3QdANHNgO/9kxmWBknF

/Gvbor6hxkGm9GbryNknAh9EUK4Thpxk7t4FMOXAWA
z9ZqxKq16G43RWj4DdDBDO2b5PuySNrZhudtbZX5q4GPzbyMK9X8JpblF7MvW23dvrq1KfAugBH9C8P/

llQywmCgadaBPDpdj+P8e+p7/u727E39Ujvx0z64+OlZsi8b79vOEp1aLuZ1HFAhgBnpKAjJQTW4lpN6

7z1t/LjqrwKQ+62Vki/3F6vzAv1OPXVX0bCBqqFxIC
UJYJY3Crud4B95Er6rlNnqUTs+TjqeQVPm6HuRyo1vYLK+kPg8ZHhsTXtq+hP8AhQwc9ggCAf5ncelAF

QWgf7BdCp0SSgyKBuErDdJN13BBHOJ89E77B/1BYRb97YksvgQBIqNSX87oNxzZ2egqmb+rgf5EOuHbO

laWc1cbz9gyUtkM+W83HH++hAH8erVj2GAI/R4hiSG
oqsv9x+xxDvZ0vG7k8GkhFfkOSz3cm6SFtQcx3WrQ3z4XINTRaWgB/ZjNcpVMApW1tLu53fQb6YNqWEX

UrBv4EHiWj0grSdOyk+MGU53RhrUE1nGK9GPqUpjHinXNTYYflfWvD6dE3p3BwZWpRga5M8/fUq16RFk

4umSXom64Jz24bt/QtJgQ/RqR5yEmfQDngeVMrgUyN
ibAitM8tAhYdYnZnfiBq9uM/NMAcaG29XkpxLdSnyKxskCvLRrOV8zdukL3Sa0jEIcS9POlQkRxcsAyT

yVQJPFAq2ON/jVmmJJwqXaILAMAVkBDmEHh+T2NvWVjev0Ho/Ri4xpMhkLxoK9JgO6G11UL4k53v3ZSg

P4c4LV1LIka7Mt62CCDV6jIIAjqdBg+1MqkCmC7vsI
Nhx32qrUOokLF0LE/iyFQM8kOyVO59lJLCqkpj04Czg8FkghR8DlC8vvGAUpFp9CsG0TnQenejkqB+JS

oeVXJqq1uori1okEodW8kkTVxeHwHb/4eVkhdr293Ro6SHMYoKkazzt3qOJg2VMV6yvImNMa8gmvUBXm

iJAJ7WJHLJFkMGWGm03VhsEY86Je7iedeGYZl5Ai9U
vjywGb2CWu7D7naKq0G47OyOuOtrPjVCX/HKn/dIxuhs8gc8rwYM4ZTa0oNNQQbEqF9hNbdPZg7uH5qX

WzjgCDfsNf72O5d6EnVHBpuryRjqPV6yPWbCvZ2HgEP5JwIsRfQXRLcBwNkICyK7/EPMLvfjEeHoZRko

IpM79BeFSmQ+dcFzw/URei0SmU8fKg/rNRRWjQCc5e
qQVnHRpJfaozVV/QR/JRdXgJ+aDk0lQkDw6IDPRpPbNYj84j58aWtyC6xcyB+5/ddt0nZ0cuMC2/PO7Y

8kgergMt6P1c//G/ffxdm/Vn+ZCvxRL/qE6//spy/JsRFrHrWovXKzW5Wi4oJ96Z8aownLaDGjhtyhbZ

mEyLCrnL7lDToAaiIyL3UtQxLwGcrAceFniXW2FKkD
jK0m225LMAl3S1KPxJrsTxpYwGC/WGK0a0JfB9rZn2E00lA41OwJDulvTElNOk5keiuADrbT2BLAp6g1

KL/MXcuK9yyNHSNgQ5SjznIs+LYdsER7JfO2ulTfWv09CVPjhK5PkhT4LPslfAsO8xw826ewCpUtaQ17

V0uYYZ8PgFxj3jY7lGpwCb5266ohReGu2ZXK1VZ+q7
23jEqxd6jPogM2NSiUV3z99k2Whe7NUuwLRw3mMtEjPnSUeC3CzTQpas7dvAIaZUDnAHFc/NWxeR63Vg

kjGTjBKA1uQv2anGkoSOoKjF3Vzo0RMtpVRHswShEtqALVU+rUqGyDMmMv3e2vVonVSZD0y8bnF3qHRq

JmBDyewHeQHh5yyxGx2NlnUpynK/yxPdamhe2kx9EJ
5Wne2iqze4VDK/N7ita2rRXrH6Dj+QNx2iMMEI5l0535Fi1e46gPnaJ2pyXZA3rGrG88uRwpMWDWx53q

vkkYCB8PKLD/z8YbDzaPCFrlIHiriqZTy7yyPIMOD3qCCV2EwngOIEH6+iBYLlWRXMReJvjWACMbq0W1

/LS1tEW8bKZgGiqAE43f3asC/DMaa9t1BLl8g2mB7C
HBq/XaGSyJsyHHtbzgatJtUzIs0bOLQEfsoIFforkInbrP14ux7EnsSSGVa/PozR+ufDmKy/HsaYpPx6

v5KcizL9o4PVvX1V1Vgp5gcVKcevP69PMYh35JTTjgF12iOO9+LPjgu4+BntCr9g2BnCqClRjxS03vPI

idXhelFpzJ59hpxUgp+wxfXNxVoXA3SLG03TnmP8Qc
qG6f7RreTmtUuMwfNc6xQ1VKpFegbYPQ5BmnethPxdeA2aNMpY1dO0tv8G9fvNJ8ipvF6lbFKWCVkOVI

g+UvBu9b2+O8vAa5wGfFyP6XlQRQMzqE3bCHAkMiMZYsptiVDWRSvRk8i57k7oWfGsRokus710g22CPR

5YlOpAtFLXtJNmlmumMd45gVdP2Mcdm9x3Fe38rBK0
vtUvVME9+9uFBxpXwTGDdld853olWfyNTBfIBJykiPIy1Ab8rQL8hs19hniL0CspiBEV/gLxf1up/LBy

I0KRWun7ygClDViwnAo370seEcua3LYc7FLv9/YKGMqLBHpOiY4E/rqHumS4WxkryFBZKH815f8e9Fky

NlhUFNYjb2E0WgsiPvrhd6tT/hJhTHyunHsLeJetWJ
3CfUH3aW8CX0UklhLApjuAv81lyZvzdvIzkwQb6Il9EiaUVfMS+RnURze+HbyrI+ASF8jilreAGpf81A

V7hSh8tVS4ouHXHiuASMF4ZNiXmuHSU1TL9x5XRbI/KfZjzd3PbjR2RQmx672yTTKqS6ofmE2dUgzxnc

80iDWDSSaPUEfkEbAyXbp7w4sXGmRd52NWaE74yv8t
TamzaBem/Ot8tiUZWnToV/Pku6GngVGIx5d88xLm9wHjMhzrn7Al/WXuEWivQ3rYJbHeEncGol3EIw5A

jBC9gZZKZnClacWDMpTn2dLPAtTl+BLRN5PNqgzH4KAWupeyAUs2lbZYIuNrVUtj9dUYS7EXM13OP6Ev

MtJAU+L9MVCWn/R5BXzLVfdDjqYYgCg82hfQt0iL2m
YfmFzLkymAQ78S5Iy2oof9Z6BJJfaQ/1Tn1j9vNpLe8t6owkoPfZxKziFzj6fMr73sYe9v6vU6KQ4w9A

yGTybloRMI9xI1ZwWmWKum7hpD6Qxv3zXEpRbQzKBaP5RCyKOA+yuepin5f+gJKsZHYvb3FIr5hyvm3f

Fs1gIcjGx5pHsSuhHiwfHcybF+2se6A/Da8QhAdHjV
wTEtKmRWqANPDFZW8cTuful0/52UorFGG6YT7ebE/oQGkwxT8tOQGSoXbtEtxal0+fSK97RvDpfkPAE7

e5fFhukjVx7akUsPdB6FDXbNzWkrL1wqghNU++8X8VlBpc5WaxsbDeJOOu7d0jbtltycQTu/qe49y9W+

bFWqS+2Hf1WmkZ33QbFXsgwnYwFaCNB1QGQJvvZKgy
9lkLNqj0mCT8GoystarkgaJyFLNaf6JycMrqb6gYm5kqc+yoh5cucKJurQXroS0fuBE92n/ei7UCkPbo

wc5/yimGZdF7B8bO+pVKN9+3vRkL41NjQfqcailkCBHZoTW8B2mNikF0NMYl7DC/IgggDAm3xq3YpR+g

GWtq4E/YCmWUnW1lp0UaIae9qS5mqPToalb+t2uiMh
09utvvee1AIDgiuD2hhx+nl9tfpIshzcy9xPZtNzGaNfWbfIga4ZtSH6h0O1VKnIXsCbSubD8Ob47dXu

OJEddgZ7b4qV20MEa3p3EZtNmsptWKRiZjgTLR1nV0G903O0cNVAHZ3rtsToyH3m0kmt1kgUkyMdtWz+

EYQ34sPNbcdHy8AIf6QYfMz52T4B9OVWI1////nGSe
OS2t4pQravwlTVAVBVHh4r97U+zKATwpBIASgvQIjh/TO457B85NqHmCCJUxuULcWryLqrDOvEY4pSoX

hxCcIPrXBZBvjQ4jxmIKoYW1eY8tA12so36/PW4ZiVNxLl9DI5uWmuBFG4HY82Jx2NlInlaa4hKsU9NE

jzDvcbqOFk0mGvxRYpJrW9TQWsXP205aYkneyjIQA6
UjnTAMmJ99K2b4hTAUheFtE6vNsXyZiqn5H7kyk7ElhfDSMFkxuD3aFRIzCQbrCew59UwcTFmwK7YQOc

0dhBkC8+wqGJpx45hmpDSMOaDvLQBZk52KfY8JwNk6ws18tOSrgcLFqvUQR0FTcaFY7fUAxFJY/sMIVM

zSUqs3IeO5N7RJTCEI6vFfrvkD8qh1ARuObdjBS+5k
ekWEIHCVXIl4uYwLNsHRgHp23v2UsyYDVlN7s/m4YDzgI/+LyTYest4zQeZFLj3etPR+mv681xYIlrYq

a6iigBivwVIUnabk0F/Hxu06VEm21fdfJo4MuDijXtH/t60pqekW8slDbaTXwGXv3TJ0rHAqqrpy8TGC

aTwI30xz+ojDqRKEdJU91sSDhAsg8FGkIzitcA/ROHAN
pcwJDeeWq2/1OxsJdyEOqirPTmXQHlOii7DsAlON8FfZsPiq80zoNp0igr8Ipn/KmB6+tGhJNwDQ49wn

MmCkB+fHV/UkKg3qH9Njhlrm45SDMKTDmD7KEPf7TnGNHIYKfrQkOFsVcnBYAhItIJQuxHAU3F+C4q9q

2/JMz89ZexSkjFKliPlWg8xTHEz3ow8pIvx/KTmyhf
/tCOc0dDG4G58vqufKYKYNvnDmpxlylM2Wf3P6PpNgM8bQD51pBFj/IyY/4VX5ColPlNLZzeKc559BYd

qlRt2D9SKHOSOpnAFfOptQ6XaK20FNg9ug8n9XhOdyozo7jPLDWVGP136rZVG0k1jxGs8ABbVPKpNdos

z2uo4RknyB+kxVLrlCFkEEFAKJogt2w9lcL1veJjDT
aOGtuek1NsPIr7fs7tpurkTEkDCA+5wsy5rnykTx6ThM/Gki6i8iovaad2UQTwJClmZIKUBphLXIxlvq

Hu1uPV4zP+x6hPPg+PS+StIxcJ8MahQSai+xagy8R1SlPrY4DcOTiEK754cnQ9TlFb6O8v7MAMGpgdbA

XOqdZftJ91ErMDEzM6GQh9H7bAIqYpg0utHhUTvjBn
A85eNeJWj/tFStyd3Ja36yGqcTXgi29YCE1R1xTdCLMrjCDxP1jcm0ltipU2iUTh5U39RIXMMo321OVW

06Ebsiwry37c3A6IFfY/oeW2/3RwkabO2PKGiZIK2KHk+CWmGZCl4FwdV18S/wZQ2G0m2bpgsLAjqiw9

6poo67LUfPYakUT2seGAFd2ZOA9RNcRNovYSec8kqY
Agxy4OqiwJCOmvJCJ8gg7z13dyjo1rG4u9vxZH32rGZloMGfgaSdgsSXlmI87jg4/OVk4EPjsMjLMnjt

0n3Mzb9y29pd6hnkejn2ysoEOBYFMkysyPDTtXENDsGf4VzHf1c0mEyRaC/lUOaKRoHSclz4NAQc36q9

mZhbbWo7ZYq8Ac55cFUdkLyWZ2ScvbwvZKyZLjCdMm
84gKjCW44bHLcc1zza/J7scPPUe+TI3lkit/vPK0lz5jkPN52p4Ol69vtDqEU3LeHS59PqOR7YIU0tGZ

Up1tz2N7Ok4e529CTNaRcCOBa38rIOtBJ1o+XBXZxBlq8PG7Getnqfvt242z8YpP2miM5JV/jxGNMYrs

TX+XJwQOlL0Bs9JCyxsr0H5ArPdCsrD+QyeNJ4NFsy
U7rZOf4WoeydByiIdU/1ZbVxz0mUnuvIYo78uACsVl4lT622t02u2wCxvUJD02WRPfLKrGyP6FM5zn/z

OPxmJKKzWCQrIi8xFDVKxUdLTCvPjQhTE7cq0xvt8YRB9KawaQcI8WZX6PCTuTwbWtzrq9hkRjr30lke

c8650A8SVyAbaP+J3uYryk/1sPprFveEOYNydMMLuL
U1ssmAWV8t+AzEk1WLPOK0F1EQVnXcqFYRFpf/I7FOfG7SaCiYsZuC+SIsgIiwuVlveCe7KvNiNisHTg

fxCJ8EN8KQDIh24+vMMUtWqRQI6XEDcKB/rHWB94jeyAzamNj7JH339G+p0jhIV0TbSk1dyvMaLgvcrf

Iv8vCnE/gVxTgQy666BmUJQW9m4j8rCYpeqnabPoGD
GtdbLeTndh2Y6Z8NPcoiOMkhwYkN8jcbpeayWQKeNjaaCATlKIT36Qx61fqw46jd5ob+4zu++/lx/pjZ

fj13ti1e5wFG6czF3DjVdGhpye/iR9edMvNNaOfrQYihxOO24gxy9kImWwsYGBhQuAGFZkiNSX7Vg0ng

hwQeL8ub39DA7hwUe1FZpyIP4vMV9MmBB2tIY6mf/g
7KSPx3DWQtPdop/u9b1WcoBANitC58HOSDe4sqAa4qFOUpOPg2yrB0zGOXWydDeWj2Vk9Uz9junBSsoh

ex6ejRNvJRtHRanEadNB99QDJ3scThPavxmGQhZ99Iy4BQNLu4lsgdpu2fqm0eQgaPv4EhE4enTK/kjI

SNsznFmer/5EiM4P+nixXS5X03QrTfK/3KiDA5NHRV
Zl+w4d4mw7/VqinWDEDmcdWfT96cieNpimoqItaWO2d+XxiMLnaxJbRIjhovvX20DcKzYuUBdShPFoUK

w9cvBvWh2H5NVMHGmhxbFDEFzciEEa4SRdxcid7lzXYZBNdHvLWaIkiqYyk60JRi76bM3aJpOE/0Jdk2

UT8pjx9TXIaMZvzRWeTx137LedSVCoaCj4binhJLX6
Dy4BN2lyn983RnpHfOa+Oqp8cDBEhgaaAd1cPwswdHZPLNbiCjgVNsBnh1cPkGz124aPM7MTFUINOkHe

93+qsFGK8DCE7iZp0pvzjJ8IXKkdAs7N2i2qucfW/HgMJcYscxZYTDzTXL+uj73FS3I3NsED96KguNRc

lh7jVSSwuwOd43Ezjqt3vTOPJnlPLAgbEYxYf+RBCY
ORR7aTMt5egI0RVMMV/cRAZK1oWLEYE0rSqzLFlbavrfOyFepHAIfg+7cGQ7VLgD5W4IZl7WqIuggSpb

zuyendxrL8YEO5Y+Ws5PduYFqnrIvBvi8KVhjrmZr2ODnnWEeABLJis4BPJoX4EQG77AMCb9ESeiGoMk

+tEiie/iSSvvmSszV5CSflbSh67cb01fUNi+IvgYzK
0AjVdKaxu3exI8PwoGBchJ/jSxO4MAoao5TEyPJE4Ftd1dOqbNKkevegHa3HO4BxF1gEca2I92BhZzuo

z797Wz/CX9Sk+BGRGEtCRiZb9RoZaYsSlsjeOioKn5N4Zos3F3o9tOrNP1p9seivNMHMEwvrJUH9Iji5

AyYQ97Wa3jTK6FczBD+dp0K/0QvUb5qd9R4g55qH9l
TAvl/+HvNGP61cpXPBHuvi7M2vsqU/bNsXF0b1+YGxSC2edzFEuZDhAR1hPLbrLZrlSX4tZGSHSE/S0r

aIdPTxTC2f65g/yqI0CawasBsKkzjo8xCyA3kM+VAybGgmmshQLKF3kK2x1heEiDBU9SsikTGaae4/ub

Tocz3Nfc/VCwLqUt2wwB9ZsykFXyoMH0AbqwcfXQjH
6vmfpagMYvvL0T1cRa7KOYaLwxP8CFSsjwYiOTquj0gbua86Pjrl2vgWCBZ9GKFZt6fC9Ks2mR//HlMU

hjn+yp1IdydM71JETpSCVKDJ4V+mIOYUkxvNsImiC0xu7UysXi6+fcoYuwmZ44lMFd4u9Q3kWjZlOjmr

K7TbpPAneA2V3+Y0OxYJStowbJzTB0sVVVEDL3eBh3
amO3A5PObHOou9J1ngyr7OKQFb1vSPFnGL3EV9aO8pIZbNCT1SoTe73bBjBFXPJ8gbeQnrpypYnH9fZy

VGwm4LMKV+V8FNyWHixsj7YL532THY5OCTs3WTTejOpLe2ePIBfjekBNC4b0er0mZ1IgL7ZKA1ATn8l+

StkfAqO5ZUfykrgExXEB5Gau3a3czxmSMmNw9WXpq0
kVevSn1Hd9OkAiipSbOAHqetGZwjt/rRWOh5KlWhCucmQsEP5MjLdGUOAvosiiUx7ySQWVOpaeMr8PMQ

JKQF48R4N6YAhbNRxnScepviewXwqueMAkenylgYT98vNebrk1bxM86Bc21PFDbyDyurxzo1IuRyUCtd

IZs4OOw67QLyQZEvc8DIRImM/HHFavlO+yxJsoWoq9
Lf5dVsHz2cAJ9xh1gwjmvGsFHZcD/aLM4tob9WodsrSR8V6sVxX/DAnx2/w+quv7xIPViRHGUiUeNhvS

pMILIPqkJtmOt7YmlfYpcvA7WxaADWFVexpYiHgOBPNjH+5no+3UcyuCaSut6W9F200y5cvjAs0GdaBq

aJyfDOzEYplPn6C6OGhC8j8zXbyBV2ZHNwJ0Ebr9YF
+5TE+jHcsdTHM9bstlFCf6USx5r0LXYXd4ArC22H8cnmKo+sSvo9YIKFZ64cstOpFfD8ClFu/3cSAk38

iqoQmM0ptt219A1hsKky+HKzjB/AK1KMS2fxMfNKmmUSXpOp2zPW+UA6alHWVBpSrnmn9DHMPV7Iq9ch

UJv+fLV5D66XQKUUKr6sSZeQ6wDlojgM0CHo0AK8FR
m60gL2JdB4+kEtepfcWW9LzkHr8Es1Ju6iUXuu1W6pKdGS6uA0ng34RlblyF5CTq2Rk5o1UNl+aL6eNn

6MYsfcKtnG3oi3p8Ax10jBPuXcWThidz/IiyJcKOyGGlkyF+10rWSXeCvlbUO43/WeUHMBdL1xv68bZg

UtFHsiHn1mtpjh8/3ovQgFBVFidOmkbu9bo6thaOUL
CKtLRw8YZQeGiRlwlAipg2SN0fwBYGu2Z4YyflJZhY1QDTWRworNSWPIYvCgis8zgaCv/FjH7l3q85C8

YpY/t1EHr/4HXCoLi4RUVylSOO++T+20DU1Ladeftc2jOcA1RrCQO4r0VrmHaD/bGOVaQh5fyrt2yGkF

0Smqoy6Ea8WRIHn34iU70BEH7k+JVgJwWW9hasB6XE
eSWaPhtgGl5iGaf/hubAox69HVO1y53/s2a2kvQ783bQ4NikJN76GPh+K4kzj/4h2oYBDTLI8kLCdkdT

9mZNluUs4lhVNJUGtyaKJkFqFX7ZnJgERirYY4EJxITJP9mUF6wLqFsRQKvVgf8tNNFl2v4ZbbRNxyqf

fkCThv2y4gawXBPBLOpMyS+6cAtegHV1DktPKgocoX
EjIMVkRnWaEYBGqzeuS0RynrPXgBCqiAHlHrlmZ4VXI5HArKK8vCNXCC5670yX8OTsjwMG3p1iugC5vT

MKX3mqHCNd95Fq3rU0D7y9SgMP6mOi/UOyDUW7Xy9Zj9eIWIHoiyQ7rOqY9zlWpnZR4AsC5N/XJx/im0

kO1s3Wb/iucIDqD7jd1wc7YlcRYNVd3d7CqqArVPan
4wd5YiBLaiGYnEmmSk0Vc1PngnJcRCljC5z8QVnQ4fZHc2gCBQB/GEL8+jgL4sE7RFqu7w7unkKagUYz

P4W50WAKC3wrTGNMKu4OkFqUIcUiBWdgDmjYvViDp7O84PPmAhat5mRx8BDZ7XY46p/NZj34YPI8Qw9L

jNExLM7aQs6ewgpjWYjvmanJpuzH+JEayTrrjuhnTI
ySMgbPOzR4yEgc32CfAz3hXvpLh08XpmLNR4upRF4VMGwRGUpmH4f9PYG1EA7n8pIPQ6D6wtLGCSYz9D

KzTcsTRqE/OfW7bAweMogJfcT9SmFGezaK2h1+7PYf9d62vKaedybS2EBHwmStINi1vRDf1LASVA7yX5

3dnVUwelhzrNakjjjwHqHXAYBEdzW2HRR2LEb0AENB
oCdHsATu6dEim05n5vOS7GhfpK38EqebSrLvBiSSo46x+s5vvfB+0M8o065InefhRaw+Gb0jjUNo/Kti

QvQ/UtwPey2A7JdIognLZiQ/JB5GDhE9Ajdqp9+ssl93EF/5Z7pU1NHwiek7v2Rsc+GOJMdk8UDM9jWQ

QkPUpK5RcZC77pwP3m4fHT7mq+/qVp9zjKLPXLn7ue
PuQdysKUf3S4NJBfLFbMIQTFrLMuQGh/myeOexOJNCfCUDRbqBwRZiRqIeouNWjqW33ev4hDZKqLaLyn

6JgqJwtkn03gMM9lbWTDxgkFzyWpoCiz9rIk9xbZZezquw34Ox6LzdjkC2d8RaqV826OPRT3hz8xWpdw

wFWmKWNw9oII1e4ZyEprPNJWxnSQeiYKNrUNmLgIPA
KOtZOUeyZDoUCgSXENCb4BdwPf59hEoTv4V4efC2IhKyQ4ngVRJYsLPug7N1mOug4OkJAEq3QLGbiIlc

Z+zQuB2EOdF8hAMSvJ55RQXbObyYWSBkwcuBHoe8WHYGwKpFToC29xHeHwncbzK+vP4T2fnaFuUMW819

tPMhB4bS+/3DVnrE+jGlJjYWBMrvpJTCc4CuDytiMB
sGucO8j7yEN5iOT29MJtL1qotT9D894uZfW+2RGVJOrO1jEcdagYrQCqZ3MlidC+m6iSnXA7SoIUjE5c

bt0LrXL0RjnNtMxv8EDJ6W64bC5rJZ1MnFSjAl3KvYm6RhKBRf6n6Af/xTAvfj3nNDa7lMolnWKyhmub

/gxulkxok61xaDm8w+5tgiFS6tDyk+pZnXjo2RUHK3
FkrFN7GW5bnWfYdJ8liAX05t7yHrlKxdRgC6yp4Cm6TGheasv8adeAH5ZffKwF09hzppynAWK5wOmk2e

w9FIq61pr/0it9IZwZqH/rNA/UztrE2CP6+9Sd5LkIg51TBK2NPCqQ3mXV6cttOuCpqFImAZTY4wyx2U

u7s/zKK4AFPIRfRmi5G/WVbeW+8D2NUM8LlvAErMMw
K4juCTKyBFzfElmx50u7o8bgk/W7i2qf7S/wEoWMKplsiIrlX4S9Nd3XCojVh7kCDYDI7wjcDZF0LxEL

SP4t1weeR7C6T+7zdWywxVt+QJp7Zg6TgGvkl1EqKexbzBNWSzZVjQ1VGF7wnEF3C55eKM2IsEiGG4A7

LAIBeVSwqx3oBPW27u9IRc6KZ7r0/tzwNRAAfe7aY6
aH5zj0OpcdMCdl7NJivizbuIQqfBVo0BaIjDGzwAxBwhB0Nx9QcW8xO7lWhqLBxMs9GnHAdSAuZltThz

e+2L6gDQNnqkqUMxAD+Yd6j6jslJisl+7YuHhUHD5w3H/VIET+l6ZpeKG30eUd17E4JZzDi1r3aX8BGe6+

Sx0APMUicdaIfJzjJSRpz5jeq1sm+ACx7Y7YSEizYS
IoPdlRYYYEeJKuwQ9ypJ/BQaa7gi7lRN2yvHVlvOo02lce/bQy/pDOKZgJn3tTzeZH2oJS+MV6NEpSh+

nxxmweVPIT74PDXa1h0Y5vSa6BkeMdgDtypF1bG0Ixmx0Us1li6I2U0k8NnZEsIppkLfN738iRxBVkoq

+866h/1tQU+Gq1XltVU+8gf0D8tFjxKjat7/0Awm7V
MVS1Plcckyec08NxY3R4BrUZmF1PajuurBsxH/KEZ47DSGcgQdcBR75xdGDNEZ0+JT/KBMgN9EX1IAXF

wrmLIRMizesHEAQquaOw5QuGGwlg5Shu0bnXjMUFVyfFznpBVakQNDqgbhpQKuMsE5p0AgeutlYzR5Dg

9eBL1mmYn2cs8qdPPxAD4YmF32JlRoVLUIWChnpZqU
oSl4+0KyFr0O6b0cpaNfEcjtKltMPgu1RvS5D7T3IRoXUl3W4ZXsf/FCIiGUPx7CslUGNQ60B9jg0iLI

gv6PHxYDMs3eoli3RMhMdb3A7KvFtY0cyFlrquznvegG55TostJWypFIFMp1S50rqOr20S0TFe/N5Ktl

efy/OTUoM+coWRRcz46+JvOd1zvsJR4fdhdFPsJoeM
OdLHKCtz9b1va6uCoM2mTasS4GXRLpSGkiuj22jPHI0RwNFtAwYSpEQxDzjDItbZOXV76RzzA4AZOySs

l4waimBp1OmkmOps2jV4KFinT9CbuWN7Ouwd6auZm1b8LeW55JH4XgyW7Nz89vWQrHhTj09XWAmZ89dE

YuWNshnmy7A9n3YmXoUkN6PJQn/kbWP2QQZ6I8yQPc
r3hGiQC/i+0J1+MhdydoaNCIMpP49lHecblNai+WhnJCLrfDD5aMWKfe/BYiWl+zyNickAHK1kU5Ynby

NLGR0ZaYUJeNkZ+YNGwX1NWf1aLGc0FSAATccuQimeuAChmWbnoaYAVKgzjI77xnrhBZ/fguhEs4T6qm

/aiWwv4uru9bXMYqxxr6T3g7P+SezCO2BqqX+p0zAA
w4MYE84wPajP4oElyU9kc9b74awyugHWlqILKpUynNTLDs6TAj+jg71ZV8z6t1ey2FNgfVkodtXqyTlG

xdN53L7MS7i1pUX9pbewYA+EQTq5wXzgh/BbJpi/uA4sBP/bQBKlj8Sfo8+afxm94KcmQxCotGHR//ib

mMs4cE5ZPrSitCUAluNP13ue0jFP19UNAxSFdhsUO3
pPOtK9tFVnX+usRUCX0cevqqyR2j5gTWzlAVQzsXxul+PSlzVzalGWgf7Qmc9Cwt1CwBtU41dOM87fK/

e1Vakk1nZvn+tRN6KIWIn33Cs2HMQtnfVEf2QIPXks86WxN65diG3oj/3gFVaA7HCKcsWK276TEI1ynO

CxXobq6sq6MzOmAbFet2AUWCWsWkRY30JXfoKEPcqT
5op7fIhayhsC5hc+sNAMCoYCJzr6pePFc8RODzPxvQkFG9V8cd6V4mQjVsRfLnIGHoVZGuMUqRCHIyuD

y+TI3AZCrVXg8yF4zzoWzXXw25L6GatQYPf2ofl3owLHwfoFKA9/7z5MJMPYiZ0yuwS0nBHUNYZ+Z/mE

N3RZVC+xJujcj5sv6He9XCaxDlTgxTWv89j1mzORb9
i2v22/WE/OicdNhBCexfhwWJbU7o8hYhhdM9785W2ArsGN/3wkqjWBY5k+YcMJjqGndTTtGh/VwRlBnY

+qOcGdbQJ5SSHC83AQXSgCcMsq+DdynCoOwu4fxzKHckMKH94XVOFPLqUp6Z50yvwLXbYBj4RhTHS+7p

OzNDHIrhaa8zLg/oTVtRvTnYfZxSqkHB44ePVIDt2X
OvuKEPLxhq6jz/iJ2cbgUhgbk7ydBubNWP8fWDCliRi4b4n/kDBXlukGoNAwcpnidaAntDeeWogWlXdc

JaFolvecCRFWMgFLJ29dyTR7Sn5RHo7mPeE+CN0R/5v3oBt/VTewoKi1nwUrs8SYOFV+Rv0NKAykvdeB

RMwkp5+UREf5Ztttbh15cog10F37x5zWw9BWG7iJeM
03B3aPTiyuaVhms4ClSH+oSxqUWWWxuugk9own3a1yaEk7poCPCRgiDV+ksaO76ssWwWVMG0qt7kbTZN

ldPHuRxZNxmy97XnXmCPSBm/MyLzmxKVVRZ+0I5HA/iBGr0VZCsaAF24f/iIQnt3LAAYCF3EuUJh972e

1rXq8jWzSm3bd4iinhn+q6jyc3pvMplhDer8BInvCw
NbMTMQ5r6Zf4Ra0FD99g8gKeIG4hvvtBFX1uy2AXahMjBQRdJ8sl4KuWBbI6uP4L8XsZln7cU3lBC9L2

hCmq746mgaoBQ3tmvm9i7bX150hNrVUBH9oSqFr05xHTMZqvSrpWSSafKPat6QCxWSiwhrapvBXfoQ0Y

ks/bVUH1+rJjCbRzSQWWkhMka1xW4QI6az/D7jcINn
mBaAsaBuNqmSqJQoVNXrgvaQdon9GFYn8yL78UwmECgToE0SZIkcofJZmS5uanqGxfT1mSozDQUkfBLW

9XdbVCH9iYaQzObaLPV94XlSjYn0SNlqxEWsSSzWy89kDZ0YQqT2dMptTCxrs9oowU3VUdMpG8CXsShV

sEGpvnIboT6x9t5xwV1XOcSm3IH3VIQuC2L5z/KQ9e
/N+qtF6ukjirq2CaOptnmkS+DvER39XC2OPc0nYFGOD1DeP1RjTpLvdnQz1S+0m6IU1zRL6IMX6+OmEs

4D5hmKoM5jEwOHhIl82KvrNOuWbdASw6RWwst9z2n89LVqTzK+6Pfm7t4lC+mV+4l96feDseNwAnNmT8

K/ykSETyor8aR8yTKc43GRE2f5uom2a0e70kFFOOax
Rq9Nk9I9IpkDX6C5bKUA+KxPBbtwERDtccVnGiAreMjB+yvM2gaTb8duUFK/dLtTesC+3kXyQ2wnpwZV

2SoIr0XieFAetn3UWIhCZZaAT01X2zM79vFmxEs8SSplDdRUuFo2mDMMukvjQhM69FV1ju83J0+TlJvb

I089JoI3tJ3tObxDTXvD05rcqYK9BPji+22XfgujN9
tUxv0GyM5v6huco6+ANm4xwkCA4Ot/NrGsPJ2uUAoPXs7D4Ka38Yt8ZJFg5B12sQRV1uT6W8St2kgQMN

psiOP3xg0ypZcnQbTGuykZb3cKat2lC5yvrwNVd+fNxNwhC7RjUVNh8Bwg0KRldb9R8kAGZUm5afk93S

k9iH85hTPYHWHneTlq9CGoE9WcOrZJEIEsVs1Xe+JM
AM+WpvsnQYCpmalkfUY7utpMoVkYOYCcXMCZGbk/iSQJVp+9v8ckIhKT+23J3IZd27PvN3Vy09hrr8rw

wvR6TxpY8y3ja1eqchynIKtLy0YH4yKVyZf1+Z8SS7baNtkkc6xS5KgITciuWfJGBrtYtxMFwLo0Q5T0

fd5IcGulBTKBS+9B17MsxZOZF8bK1jAIrKNb4RNadg
sDVJvXq/ZPp7D4AAsy7YnACnH4UBsfsc4KiTD7X+H1PaDMb3RjHG/mnxT5pu9Hcr2ZT/KAYu+98VA7/U

/fBXhwCgV5/M0jLrIAysIbo6+c6h+x2OZifUV2mwnB2rx1vJzT5YpcPWsExFTpKTokG2PmMPZUEra1qr

Us3yX86xWsyFnPI39EYNwAsFmoaCkNLQ95MiH2sg2G
OEgXPZz0f22Tn1QzFFmQJHWoyw14uDcPUTMVmVTtWjn4nCCjnezzgzdfo2c+DRS5k+1MGOJDNpy6nk5X

yrBwoPxhua0t1CxqrchHC7e6zMQ1CAyPWpi1w54ELoQUJt2VRMqdgNnd670rkVf3X0F6y7rnAAUFmUBZ

gtUPZleftPfbq6oBP6eY/UEr/qSGSAiD1a0vZZ0JiR
X8UH6gLYtL9kQDXx4CZlvD8Bk3aczkOW2HBfm5h6BIVif2TFHIVposbryYkuBmvCLF1WBTUHPvZYbUgD

2NeTGKV2zOOF8UzCfXLOHem9V3Bq5QPKiF3RIWGa9RiPQPk0ZPzDicUliYTDxUuAtNW8Hkji69wtofkB

lPnaq9kc1COfgrDj+p0s/ZocEKw9f7FQ+K3ufMaFGh
misqttyWOIKyH5OZBTIsmCj8oPf/pYcSdXQHcWNtNOWXWSzn5MaUQCgfWCRwLxZj0t1wKGuidNVLZENa

GaCx9VfpMdR9F3PnPLSCt+lNrMB2lEPeU7cQLS1Sx3I8F2H4mxBW9+hiqT7Z7m5xE7N1ElMRcUroVB6D

kLm4UityTE0dgY7VH+feikj+GwgWaufOvng+S+R0Fk
5Y5oZWUg4iQ1MS4Mq/xtveJM1LoFOuH5BSfwhZhMEsddP9wZZkIfDdQsgDfmOnzqQ6ywJFYoEL4vCXyf

D+Sg8Qz4Swl81203i4IZXXVtTj74w5kSGOtpbQP8FFxzXav2s8GlKot3KRNfjb8cJUYII3kICg/9G8Y/

a4+b/TVcovHT/lMeYnQwY4hIW7VO4Yqv1pQy+8gktA
KRbEGK4tZ3pW304IsurLDrW8AI0eR80Ke87BLzDvY3T8G9+skDjWkxBkkF3rTT+Z59oxRfRz2kYpHamE

yFAGRt5lKGdLpHKB1zOeewQnCwQyD2yIkb2cICUiBfPHp9CASAk8E6ftSTdUNsemS0heWlakMwcU2UOJ

R+yTBfPReNwBmEl6vLsDWBaybfHE9Wi4ClE6V5zISc
6eG8jgJZUe8BLVVTLhNLGuL7gzomNo0I49kzeLtIDf+9pvUTOSBUsGGzJGXJkJ5w2rD2LqfJfuKEz6rz

cft7VPgXEFf91IYkrF1P+CcjLbvXaj6WMaB0R5YlPibvdZnW/ztgPW0rf8s5I/Xu2uI6wR8HW6vy+31N

cynubQlfWLnssG6+aq5y5RatLpvIQHcIrnSTd8ZWsQ
ExKqHl+YlDhSpzW78X3Md1uUucT40juiYiILHf193zRHoifkpwC7byyipXXY4uC34ennLZEhLbSsX3G4

fTyJafXEVu82QIZPxAlkmU9OLvUTLKFbkWerhgS76Yc+Kv+XCUBJ28+nSZj+Za85oI0X/ZPl7AXSaxjK

nKiRXlr+xJ14+LrWvwfETQVnEmxkikDxK1X3DxJSIh
ovEmmrYkDe255ArRAQMFS5MsCRYWa5g0RBhooBNdttSjWrXmkvvcmTLMwn0ulu8EOsEHoPSlicrN7gRQ

5v1tGJc3yyEFYxMARuCCMNTUKhpWvhn+Ozseg8p4J5oIx6X5E3s60/2R0kf/cPtww8ixC6jePT/99RKn

scjCWDYGvFu+g9KITtGpZBMMagQklYoP+mxXpIvtIk
UISEFIieX9g+Chr5jilafmV9OS67XKL/DnsujpDDZG9fW/QNeQN3ynTqLMlJqSqOzhiITHAGMEZh1Thh

LoZ1+HmY9ohwcAiTgV1jULK9bwZqf3UzBgbMYHqyoFqnox1VtmItnoe5oCXLSGrsgfbS5biKlgbDzlBq

QikLAAxDYMrmyWchJqLWCWJDkyeXmBG8bR4gwPR3Br
eXSRdnSUVuxaKMhkUBCVq07ru81b75OLSHMKfJWKynh/UOMioB+2MaTfFrJNu2LCyaNk73jCIx2mzls0

zgxkirvp2JA0q/r2f21SDCKLQ6FixsmMuox+zmQ9CMw6BJhFuhaBimkCdyNJ7bhhyRBoznATZ3DsubsV

tkWXKi2rWn6toyv5HejxKyPAXkK6Ig/7LjTz1B/sJu
i8NvJeGfMtf8JJGX0VsoHIjwud2RRttsW6xiYSX4vB+kNwvsXR2a2LFqmhRbjGeJa/1jSzROXnq6Iela

7twtptVBYnsJvRxzENRpnbPwPRHRS3m1q8toKfzRUNe3aJuRO/jp5X7kmj0Fiwpl+72kewvv4YZMDtEe

O9XdmfKNRZuG+VD3L1DQd60kvmtNjO5Bta7JQdUEue
6zSHZW3QXPpyAeTiXKJCpUCaqSKNkzTe73IixXniGV+DLat2321OGDmX1SpSepuM4s4iLWvJBBrtQCA9

INEut7miHhxh7ZULgGFK40+w25eK22s6c7rd1xf6gqQRxq4EEzD0Ctpcw15zBTLxwPVn6J4SczsqGiZR

DzXIyQTyM2kT1uYA94AQNxZnyxUZ+OGTjnMj3qC9sm
H4Z6djlzThicFUVc2eH4+XR/AeQmRl1ySged6QqQYYAyCh444vwNSz7NXokkhMn/z7HUCslXV+OW9JXI

MTR7K5uE019aQ774Uzg/Xy6k1HoquHqYFwzw9EQ2wblzon9kf3um5VrB86I3a63ZMjmxngoiwHNXSafJ

mGlGGHIGG3VQVSZzY0GzlZOjw9A+adicGipVpIHmfB
otzvYBwzdAqb0Fl2g8fOXfgSDBQOfeAz7WPE9jSdBuRY+xqnxeWqF1o7VygZWITPHrXxbCQS9AeDSbke

9oFgwLauzibfNdtm/GqgZWRV1n8qv9GHTk4F2u7ckwWWsXc1Hc0sNfZiaWvIULOwKG1UULomaQNKukk+

ruVAp/ueqitAOvxNSauB5i5QUxE7OhWTjUnW4Upvhh
Wk7yY/05GTF34zsjDRLVTUtxXjUyfoCoOcYZphhxSTeGyw+R87unCFPkltB91holUaRLyg/qcTeBtj9D

oCiaxBHFbSD8uXqhv9xViJg04y4LZBySf51o8gAQAz3/DkE33PsZAyiDRJN10pHZrCKbqrKKQ9ICBfkI

hZad/ZBdi1GH1X8Mt8FEK13r8xUmiwzeYMlirqvgSB
JSG8h7lOQDqJPtHmv733rQI11Tyq7WA566bB3wUHh09IF1P8kUe4iBZq7zthwuZR7WE6vwzKLD7FIn5i

gWCbNLivCGmEoLRJSe3vsd3fMl907a7ywKXxL376+m1nXdRf9ZKhClbjaXJ4fYhIHT6//RLcf37kAUx/

oe1hT1d6yDQqj1Xu839YJWalT/Meng+WJ7/FHDvU/tgK
rFp3UhnYHEYQ6HVwLFw9zD1W2+/T46M8n4MU9ycLt37e+Sgc0cEv6hNg1Kl4keXyQKR5iyz1TZcMVKXX

hK2WrJvXZPw4kMfLBB1/EXqJzf19WEUY87Q0UxHEnvUBzBmzQjHP1qeZLULdH5q90ZbD6yrnD9rqTfX7

6WCFR08D3u+gL8UmA6QLMjx5F4CMPISUk1xoTpObgo
DZbXFsDSR/XsdTpUOs7wKcV6IUUpP1xOF5QDGpw5q98+ptJNtrZUZNMjNOWj/+kjl5ZGg4CGYwGYf8V7

jXmoKnTSc9kLC42VKpmRgew5WOKwgMwriQq9WkbcSe0sn1xupJtF4O9d5ynqt1HvEjkEyWOMMIDckbzO

1Rwgy6muVkg00RcRiG7d43avNj0QlWMRIzZZputX5J
svRRXmT7MkDUd1kSC0ZDNn4HASyMHml1x4bZDRqYT8KCOcDr1vg7VOwutbvM9nJUPgfbrCopn9YCT0KO

QPuqAg9vaVT9H0npX+ciR/WgWuIpnp0OjToFk2qnJQfogrhey0ZRdd5FqApAsHezFkiTx76zDxPaEuBq

FuQz2QS74dXAGmivg3EbDHkF9/TuvTa3vMKOAIwHWW
rxxUZoAFnTtb1HZhi+cL6uMIgvmXDCusWkMQMYrEj0JF8Es4tETM2PGOF5PoAxOoN9+cSN5ACyBKAq59

2XzBe7OGw5PyPiR3Z9mSEIAhrBp2dLjDQrk2M02+485mx/O/+wr/6UcV/4pi8eVhpolRW0cd4H35XRfr

xA7E6FEgpzhBAxazHUWl5diHKwfsGFApnmmPnfY1Rh
AGlVpxoZghfH8lsyySrGUowly86wH6JAu3WjblUKseMybUbrPxqCJAjk4LbEu5hPUSRy6UxRVVGedxUs

ibbEZvrEnGSICSx5662I9SE1A/I1hl1AlqJ5i4Hn+i3jcg8/UWdZ0tp7bzQS1cnaIZ3nbynfU+Wg8JrL

t6tCxjr4nyDzgar1fod6FVXbhrQeAarRHRBOy0oF1s
vtQQ5EfnFs/4T88zVuX4FQIHhpWdcUAtkMTgqKPEXEqhtSi8ucenIuO7lsMvO9x7oN+uOql5ikceIKnd

S4wDVdPxzLFlCfoONWBKn2KFDQS2e56lgzEOhiIBwaImP3uKgWNU4mymEpRuzzu2zIp1H31eQSZYT2mh

ZejPRja2tCN9oXA7DNuAIw4Gdw9JrI0P+UQ2c2JCvn
R3jlmAebSFqxLoV37AZ04orILijHFLFgvqHHaUXnEWa4CVmJ5S1ci2iASryAlKGLJRyxdN3aEzMtO6DE

ala5rLM12hbeYCJRCgXJSP8bB6cSJA5quWwWM3mRqOzLJzWQj/iIcAzR0eowyry/1rCwi4XjIQsZIOUA

kPZzHb+jsh9WafHd839NZQ32GXAzDGntCJVRd8Jsfg
uX0gYjJssF9tWejH8YEOp9uDZx+MmbYr2fYe2Yiesxtr8kVgghzWm7fQVTLZwuPsDIGgD6Cmdm5sYldD

9mDbmLjxH56sDlppLcFgUqpU1Xvac+arfx+w2QurD5h+e8xqtQogu4M8iZ+1WuozJrSZoCultFMfpf/l

TM7zKna3bO4uWYFvLkFT4Wrefjc272cADI4omu117e
wIgBCJWfgH3Rc3UtgnkYMxneIFcCFhrmI8l46QQdaFTkHbbUjBezAgTorDsJma5DO6Trfukkahf7TkIU

q9BHx5/2hkeYSlRiGe8j93sOC+8vaiB6+5wKU4kT2wHfytnqm1lZzSf8/RCU1uVyZ5k5KziarkTfrx+W

SNXQ7ctUthARkfljp6yjAcI5y2crBjvT8nnKoWHIum
z3nzbXrsKnvTBMmi3Uw6opdCcUgQ3FN1Qo7mbcv0HsaOSH519RVf6FYYjBL2tJIb5s5TAJsk7coLwAvK

U3T/iQn4D3k919OWrrLmuQDjbJO4N8+BCtJx+SvD2xIYKp+R19CYy2TnSgLHlCLKA0GV4Y2mWT+xffbI

Ggl7ZhdrWpimVqBtQbWUpvBtKB8qqvLs1MyM0kIo9X
BhmZigWBlRFtspPIFSle/mABPfskLNOypljHnoUB2niEinxr1OKizeluE5tBlXpHSqOyVxHWXQDMkNzy

zGqKEHZUQP15WFLZe6ky2Dwf3oqhkfg6HbiuH4rC5t4SsqcZBLHM4o5uCp88sDj4jjG+ZAnUmRaicnaR

d+Dw1Vpm+F9UoeKscVXtX46IshtZWXWlzzJH9ZdFYh
BHOFT0DueMyRbmjlzGgXAKo0Q3y//1KqoA1urHGOD+F/zWkFmSoYlHpEFaVdxLoZKCSwLxXqJuJRkYWn

vxqsNUD0S0gR/wnp/K3uQh/H3/u4YNvO9vKG8zx11TkFvnRiIf1bAGo4nnV0bbVuVlIWHOd4RH+XgZw0

yDjJ8PeKorhBO+xdgj0fF7MSBdd6vQOP+eGuWw7vuQ
VKN0+BAqHzEAteTO8RViqvUAHGF3qmBduvR95QDrXz9dtCLOxF+LjPlPx4WPZwKnJLm235w/Qeaf0CVJ

Wk82fXLcIPztQ54Jm1iu785JUKQ1geBbWSamfdIWqv/YlzcAeA6Wpn88dQs1PMy+mTJGFDB111Y1gvSO

/cx9coaXGpbJPAXCZgm7LScgF+5D48i9jcKdLYvOoK
jQ7W8lBtZZ6HVVZI9ZwFJzPYm7pME6OM1kHJsLPQLMQ4WE92VqU4CPHEfB0rN6LYdfJVwsh+xsao797g

Jt8ik2x8wdqeGRupkO23zFjnt7N821DIRHWfVAPZJPU0PJC2DXBnI9Nt1js4vmfIdfcSocYl0Nns3V0S

sNqhkskp/t4ML8jkXy7bCkwOpR4c/WjcJWWgo/9J7N
dNodUzGpU25nHxf6hualCnDcr4AFM7GjW8V9xxsIJyu4rSQiFq7T3esPXoXJSUGHgi1EGMhD3yUzBkuF

gLdBlxs46eQxkJLkltg6NtcefrZtsfNtMfLMCFtQ77uTF9SqKAs1+Yx0Bgn2Bb54m5klBiYvmH1YJG1m

xcaRCxdhn5POEbOOPL0R9jG9Cffc2P2o/IKae9V8MN
9PCvF9s4X0j8n2zBm98ZPe0nNcCtCS7x4AFWPQzBmy1vwfERqah8Bub15rjbez1BQH0tvAsvIWcgc/Ko

ilqjwQaW/v6zrzgLOuAOWpg4+GUJsdjOyt5cxv77+T2AMQKXJSKL5g5aT/3X+FryQpZ4ZNDYnq/OSWlv

4nJ8clWTce50uysILcDLKxgGz5D26IBnosJITsg+33
DzdeJ6LE3xAcysbnY4bBceAmSeBKz+VG9S+9m9nZMcfluPoTT3e7JvKfQlNQTcvvj5OxA7lmt0X3EAdh

1uW9itWml1fQqpIaEUQNhOnvuZ2arfa6pAAdTvSI2UI719p0eA5lUWorkZO/2zD1PY5sntjYPzG+uqdr

WRpfMDcqB3OKfo12fvztEZBJK+ZNRRM2XPb87dSFOG
f24Sl/8sNuaUubPO4i50FqpLFIIk9TKDIdjcloFGZiGeM9gq+LgXAjXPqsFCX5bRfNarxHIAPmV7sfaE

EXx71sIkxHqDx8AE1N9NKNRMtMHFAhcZF2rfCmfPEwlCfYvNjBejjFwcdmqNMGl9a15pdhOpdu6ap8Py

hYYSOiamAzdFdS/vUzmt2r1Xnyk8gf9ZvIDV82ALKF
DWm2G/IdviNIUcegJazi/MqYixjC2yvRgtr4flh/p2DOisogfJhtPQOe3gyAPl7nWlyB4ZnZCr9ZMK9o

Nk9z45gcrz0mnv2xYQZDwFVbFi3lTy43yDNznys+V/z+Q2Pt4yxz7+Qd43Bf6OWihg/FzzWpqK/rqd/g

GoDr7uapch/5ChuAK9qrzAF2PyV9lf0kil8LtcWLKF
NtMfMVbyrd0AnJf5KH/6vX1iMBXG9W59ddL3VnMqCw454C9X5o50M6iCpOd3cMOcmFCmQxL82MTMfnEK

krgXV18n33V8SQ6ccnVypW4zbuR6CZvwDjcoB+k0o6w4TNshkGF6gzgPjID5T/R1MxBlVh414iFq9sC2

5Wx8juNRBPIdiA9L77zxR67ok2C4GJD+CmVUpuoxie
V1FUOFwmSAbtNwGWzXqL6TLaytwdAK+N59ny7RSzYz9zVEuqzjKnB5UXEFwG07n0KoCgGu3EesXcqCgz

RY2DnYYVfh3NZYnd/k1FgOEtkDkLhEpOGRKnvy/VMivgKKbmWPTP86TEIli0iUDu7zMueYdspCs4ZqOx

ji6pgudUMx3pcW2qfKwJ8sCjqkjUxvqclBfPO1fJ/I
fRm9dPSB4h647ZbyZDpxyzbARXdfobDHvJnGSK8xItsYQVjuhWo0UiC/qj0kPFzw+AB9Co09F2CymxLv

e4nW0dtNybFmIdfWEdot1mUhMRzEY+sS7nqWpAESidign0EpRK1jpsGr7GsbTIlMDY84ho2TXVxu7/Ed

Hi/Jx7FlIN0EtmdzNWnff4pO0tFcs1GWuMmgfzxe2f
JmphAQv5WeCHQpj+BbZXyB2HtEQRK5MLek5Fs10t3Sp4xsjoYonyjZXZRqjJGnpsNN5KVFGCe0kyskha

P1CWXhZ5jk+4VB01jKUGYkPRChnGqO3L45bJ8LX56ilKhLfmFAq1UgByd+UcI5kry/0sRBEIUP28Txzw

n6Xa7vbwJjZl17rxqjDDDRZMnR6q53nFveYgD1aGdy
7F8jwWPFNCWS3JVrPRPFC4t/rCP0M1OUJg86UyKb9uW3jXKjtK+Q/qt7/cX1gWEl9fPt2+q5xoKLbMPd

r3W1pa2YPmmxofLpnTJQyc7ShTY0y/Y7njhqNWvsu74+dUcy96bxPAYVglm5kOcTQPmDCRtDBzt72GlI

URGIU19H1IO5l1BeQPyxeuLr83QJSzCVwpPA+gOCuE
zpmX10fkuEgpAReeysWn+Rj/ZclAiA24OXVXO1eiibTTS6S2t8qMxOFIGrrvpYvP2K8cSsh8w6VWENW+

M+jxe/jZjaKbTf2kxQgMYgrs7MMz5X6TsCq8oBSH8GAYiSps923JFFoLUAQk/dXn1eYpB6rCBgoiJuXN

AcblSk+KLMtqdHEmPInKt/O6yUotpPVTb9As8v3jgR
1LU6T2kOGY1s4qwYfzQjWhRIxB5djuO0PzxIQmWRb9JvgoyUoOxqatysBEQFtv4PoPTd+1QPzB7ky9W8

aOeGu+B7JSAxZn35NNesap4VPA2sYyZE4Kba5qsp3ez65B3C0exVhN5hoF0h6y37chqk3TiuujyvZfP3

4/DOtYNsABoCYrlmvu4u8r1i54sabS3JpPsXXX4srI
B6BW/zpN9bcTtrF1dI7JmB3BRmwvmV13PwdDvDIXxt0gIm2KqLpcsuonUNUywylVZ5dBgmVLb9xPOlD7

WolW5VVEFrYi9XHyeLA0cOPCl4qiuwgIwCyqcpqDbhAvOvehMXoaXWqDAWgBTkBuH/l6OTy5xLAHKOqY

Q+9KaFM0SramMW31R0jF6r4Mgs1Q5MbP58CtXcTVy/
sGoxAPDru04beKMfqZAKCHZ4VTpa5Ji/VjvDXIiyfGVJgkEyOUs3NP0u2wZOiS/I9cbMSSCUYYS9vKbr

E1C67kVO32+NP23y5XnEpmijrv/+wD1Oqv83jOjc4USWoZhid2FvhtaHukwqgGxB0SNpg1Ql8irLVtKa

ioP5rnh5eczOwbnnb1tiCZRrjR28Kdbn2IuudK7tmR
4bqtv4O0n6liRFf0LdHCUjSPgax196LCZXGkchaxIno6nleMxW7Jy/Gi9Tg8AG+Sj0TrBnBPaKwavM53

fwoR9S55O+Upiecj44XnA7BeNXYO61b9z5xuGjx877viG4cFDf6K54uc8T6zDqiEjbYLKte6t8qE6Byi

48q/dpE+OR0VZSE0pfJGvJW1ofoX3xhglww65HC7bP
9UawCyuFAHIOKHWGT0DR7rIapdevrJPZn/v3bgUptukrp9J9nnfSJdkoJbkNpk48w3tEgCRsgK8rk7e3

lnqK+uoi86BNII3kjzx980AKN0Qj+2/Ld18dA/VhT8LF1zBNl+RO+cbeDF1cQMsJ2nXngEo2pvR6fmdJ

2t+ywQ9XBOtGH9+KJZ22PCR3k7Z1gATE8E0geBbZpf
IYRvszmRYGisw6aGACy2DyWOABC6LUfF1td8FQCez7lh9sUehzAIKiwO/ix2FMiY2A/3sC7a5wH/mz/z

I1jdY4aT/d5xayuYaaq7Z+6SeoUm5aflyUAv6dB91z+kLRf5913FNDb0aiKVZpWFihbJotEu2KmMN3Jg

COBlYV7S0Wnt8mfuN4YRC+QF8GxQHKK+NSi4tMHagc
u9/2HpVybLvvGHdnbnhadGfILPyBpPIl0DW6x7BE753631GHCiEMvIQTJxHqcAKTZBCpsInRNG1IE51i

9XLcgP8EsveHppZCPR+FrvAtIw1q4XLP18OmN6K6Mw5z8NMm7YtaqWg33A5CH5bbAIwWi9PuqGL6FdCy

Q1MLiikZn3fPxjMd6sbFEMACjklN5X4oI69/J6GA6I
cAhAw9qbRhsBTxiiTylxJMU7BDy/1UkeCCuNZdh6VjRpPajKTUbDDx/sExtm/tF1Ven/raKdEB59nzRO

D48bAXZwS6wKR8+Y+JxZ3Lv2YYX4xJMLsHTI0bReawSDbG1067tq0yxq0S+s8D6JYUTI44/Odt7c703p

dX/wB7MQ1xFWt7P55biTexAypgYXzrr6wBdcIQth1e
nSrgl76fjbuVDRJ25Ohy+RBP369m5gPEeL83SR5cJ2KxP+aj9z806507L6+sqPdXgVEJHBPQFnSiFKsF

sZxudXUXyq8Iz4krgRtuAYRAjULveRmz7tciyNFcRt3PTO9ywADSKfF1KYWdjPDXqksIrJEAYGEvJ600

IQZJV99dkuAVkPBWAihwzt5/ys97FAU8SpigyvYeAw
Srhmcq4PAujPjdwg7GgspJzOXm14ZyEhLXnMylw0z/WWoxV+jmYMhQ9sU0SrnhIpZQYL2wGcWXw/eO1z

2j1gTdcek24dpTBEVrW/350Gj4/sQgdo6s0baK9U0I7uwJ86jL00GnXKNdAuKfqb0u2a8Il2Yta/ZRZx

q0wrcF3NesLFMDK5avPS6rxfSLZKAe0pTSobGKjRX8
Y4jUbduphZ7hwDKR/3dRDlSIxtz8hm8+F2SVea4CH05SJVpVIcQdo8FFSwNFJ6qoJAXHuNpYr0wD8qgj

ZGZDzl7T3/aYp+FHNi5RMozroR31/BxqUgckpA5SEIlLEgkUj69kKgornXnN8sLECUpwkWqShoehEQ7K

qs/Kd2pTRMYy4zIFSKXhYDXHQUo+0gnD4ISD35Tuv+
RInnXUS7RPHAojELhVn6w0bDVj4MSgDUIDnnKX9CgP6+zUMsVyPdxGrrUfK/woN9qrFE7JsFxus/n67g

TPMQb3onTucsLgdYcsqNi3iEACOm/61R7vLbxw0/sg90WKAAOPed/T5yQjzc/C26mApN/vSc7YCKKDgQ

1JyCZfj/QABXgqa5rf1P8feJxbeDtWAgw6FXuTmTIQ
3YedCXa+6hW2j0yY7eIlqZFRrRopNxB4X40q4ljjYnafGyKRFrK0WYfjD5Im9eNnw9Ov1tiuKOYrFp4a

N4B9uWn7zr2PFWcUDBWhujS7L0eeWyXOqwh/Cc3H1J7+WF0C6u0skeLz2QUtR0Jx9fWAt+ny82LF0h+W

YnxXhVobUF+mulTAzavCEA6ANnel9IjOTtaZQdrqhM
WGQyYbJ6XZNtJS5NmD4yBc+tlRqB/bamqsp1wSc8/iyrKpLBzeOzLIYZfBbHf+Cad8Yn0D6gx+ZdUeu2

+RlrU52J6Asrjw36qrd+H34RAraJ/DJEPCTAEtPa+LMzY0bZO/DVbxMCNx/689NAYxNuAbzb+9rQj4j/

u+47H7Kea3kAgw4Bs3eT8N2EGZn8xLKDMIlMLffMBq
V1OcQPGS9TdhHTT+O4wnxigmh+yZKY9l2ZCsLFsuhoZsr+UPLg1IaMHIIA1Om33DI+/Vfw0M+Ymw14m7

sQCQVE3GZ40w4u73UW0AS28MoS6W4RW+Fhf60IiR9sRjqnQ8Z7wB67Z0lgsuS8et6otFOTBlK2ZisBEh

TmW9CHRtov9/qibYzGkZT3Wu19ubj2YnBwyV6MYj74
CPZ9gDB5QSA/M2l6LNnffyxOa0M/cPH/0ZxAUz5fs11fCDymVGTknbEoIkVNjbyl6TtOs4Xe3uHfzD0K

a0bICtBp6CSvA7J1i+Aj2+pi7cKf8FyhY/VSSwR4zCDMn9MMqszydHyaRNr3UbyX6o66bul69Pm+s/rq

0739INudk8mpoL22Twun+QLF/JM51Td3FSZr6yVqWd
4MRnqcEqYiq595BogWNiKE1N65gTtZc/SFPpRelahq7Cq1zUK1HMMrq+bq80tLvm8n5UfigzknmBRfEk

J4wSR03RarCZLyeNG/AO+Y9OwjSux1ac5dIQ95PsuREaVQTq0WFX4BERmRVrbehD3DroTE/eS5NC4IaY

YT9AHmot0fiDRnYPiTeMcucsQy0akpN+bPjMORoRcI
sd/l/PJMlJxZs7SxY9CjvcnjL0op4Cew4OpFj2GxPhW0FnMBC/8jNL80UU5Y3G7os38qS0a75YTjxzI+

UwLGkrU5KwAgON3xp5XXraXs8Tv02Tozq7+K0wrk6c0tfL/V1fT3oVOjjOv03c2g+1/rkdk3fwf9EB2n

Ju6uw+Cjl22eJ81VSbh/5NhCsDiY46obK81NZ5vE6e
lD7MV4xCS0iX5LB+jKw6nc4aXwl2xhl/D/6Nw/mKKKCm07KM+Qu3evbC4VwyQgxNS2GSXxAeepIazxop

T20wVhLzQVu9T+Miy3dUpHRsOuckHEN4EiWmHQSQ94v3YCZjAJ/0gpZ/cSTN0W+whTbNHzOth0qUcMR4

JenEc7Y59P4RDS/jazzemS8q86n6iRmAOr785PZsQ3
ICrxzPId43a+TD8+/o3ugqz2uz18cZw+HdmXYQncjl5q0n9GjyL/9OHPq1ouX+hqmlz0x4sVG+D7aQ92

fZfaV+inHu8iiqkskA/UWL3+OBhFP7392QO/fiyySj6xaaEXwM79fzmRbOGH1uczkv9Nnl7xBwUNVfES

UgA+aG+HXDogQsOlnMUPqaAj/JtnS14FnF/7cb5TeS
ykFaFqEaY9sT/8dGOYkv8zobzlBPNubPDc0vZNPwhat+ofjqGL5RO+K/81f2LX/bRdNslt2IKnpDN4lA

7RkQ/d8w4iNzOJAG+Lc4L0mxaJNYmBtU8o1YaVyR9n4NjALLLNNF0iUsb80q143Ueo+AIE93tm2auyT7

eevd1wyK0LXarU6atPq5FIEU2TmWj1H5LKXcoNlD0F
pJ5PqcQeWKtJ10/zrVoi8wBA6q/pLKfsbrTiTVf0gJXQwvc/uGod/mk/+Fz+JcagGag5j8PgoVCmENC3

MFuS7ANqOHzMc+Pu1E6HvvRMp6Ed3o1qyAZw4d77+z5EsfXhCSv5Zu8ySzwLwGtI4UNdImQC/X148ott

qe2l95/cmrj1ouUtyI8oPu/wyarYZ6Wyn1JMhGaP1r
Ur6igF/yPdNZNOpeNAT3Db3lGqJN/5VsA+K482xs/egYtVy3V/2gIwNP/n3YzAx3z+83gNFbDXaMEGUZ

wuP6Dea9V/mXfjr8y8wL+6ngra+f/i2TLIO5kFOn9pUcB4pzp0qt3xUnRJkdeecTpBhywm9BF/BXwH4O

/VO+/H3rJrHg8KsNx6kN+03XOfzWwHJrCWSNfu/Uwo
0EfNw6Egjk0lczlh4USSEsIz0KLXB272xF4ZkSNshtj8NA/9KwnsIuRHVD9o1w3ytBqlmSATMpvl32ZE

rGzQAzeYHklv5r8q9e39/u2dZ49xuW9xc+S00jJhX56GGGqDzmBuSXaYfFL0aWwAJoKjcPcOx11F7+u1

PG23O7aJ7ra/JxthGF15mzSK/UXV9OBl8NeX4rX5rs
VuGXspbpaIw+5QCvoI+jDpKJxIqy+W7d+cmkk01VT1+ti4UySxuK0YmPLF/atD/nkefQv9c+0VB3B23o

Vqinnj0z5B+VYIh79wpEEOIevHZ5T6pWeJ5IdMBp7w/Sl/drxC+J1s9eVTp5EPi1hpusUzp3oeqPfReX

fHhZc4X/QFSij0rU/RGz9aMGt2Vbi7FOHyKgd5Jf/d
k9B0Z3XfpprHNpto8mH89G/9BW28Jfi5t4v4oYmAi/08yI/Gc01v0ps3mT3QasF6C9N+/RMvQIH1ryZJ

w2YzKl5Lg2C3cR8Z71Xc8xMboC7ACAuvQ3op4k+z/aECxfe5qXbD5WfQ1Up/WjA9MiM+kEEH36/DA9xG

g+q50jjLZBe4T5aLmfCu/qSsgDjpTAWZ3iBudYGxoY
tqXnW5SLOS8nroXLTENEu+UCEDaj+Ax29UiZ77vPxrxsfIlhJXyGCJqS6JiiRv99Kpn9p4YZrZC630xq

c+n7kr4+hDZtZLQCLNEzPkg4C+q2pq/ZiOMNew7mZG4V9FtzcHV9tgnx6kaOjnVzC9VQ8HKK2ZtiK5F4

KQwpfxHh7t8mvOqM5wjYy0HhONrG9/Jp0Ka0ULrnvk
7CtXZuEBIzTknIl0/ihb2G/HTEAetXLALcs6bu6h3aGDQFXQ16iGi7gp9mAPoR9PPFvACAd/xC9cd4L8

9OqEX7SrU5Uw/NxrQC39ZQ/CC2peLIJQnUd2Iciz4y/66puPV6s+xWmnsT1eYDCqDMNNaj8SnAYlCZb4

4HLiZ53ymRsvaki9pPmImyDo7uaxzwUYcW1kaBqXsA
0U+8pC0IKrEU8mCnZVRU/gFfg0xwWhEgxm1K62Jn8i1o8u4Q7ecajsZjBLQX0XVNNPanmBKEAikPdnhU

RUkPs1GW8YwV3O0BSdlzpj1DDLxFI5Ee2OuWHDkzpFkSX/WrruRaeldm6Dh+rD/5e7z31e5409YCovtE

Tl71evHqKrBE0occWO/sfbBuvY7NwUSEk1toCUoBYI
dVgtynxWKRF45UOkmmkGXhHN3i1xfJzklaSxKgjhQmdhsbEJhbpkj3BKu3tiw0sGliFMaPlg+0320e/T

KK8DFOnodXS3PALqmJRsl65jdMzr618Cs0frwYDqAJVmDPEWeRg7zycOc5r1jz51/oOR/YV9QcPGs5OD

L2SQJ/iadHtNCh0dOPALyJjbjMHRmxw1epHnM+E43c
7NRbN9ebe1cQmz15bKIPqUDmzDoUrOX7nIly5ieNRERVzYrfnJMPgWeQD+BolQZexx5oO3oPQS4G+5Rh

TWRxLZ7osF9TaKojD1VJB58chThjEJQZFa7tk3qAeaGPUcCO6jp5IxLymU9SR+3SO2H82+8sTl5ppq4J

M6eny3gdJaGAmKhHnE3my2Y82KBDFyZd10eKNXbAHD
CJZybFAd8pxie88g4z0VCOeOXqmCOuu9kx5opl1lxKij3tSmh7m6by8FaQk/PShDAtJ9Rek6JjHmcnc1

30QVGudbsPdGnbkFQ2RQwdnTNKs/JsvTgR1fXhcECQIVLuU6vhEYSeJ+LtKI3sCD1I9J6QM3oCd3TPzZ

4amL2xXlNAmDFOcip1+ZwHCFfwCmdseHYd4pdytPld
LV2IiSVzN9f9Ver2d1eXNg6OX3ehGxWTUII7iTJ+lXgiuYw/PTkuRhvJUl8Qffr7DIvKZs1lp+tqDP6+

OFXVXWFenAjsgRZBvh77WWHjT0tPJDUd8UUiwEm1YqsAd0YLg/s1rPfsTn2mcXReYhHUoZtAHzBIPthF

PQs4ABy7AXxM5mOZUlEXrmv4vX8cFJ5KhlAIikzdcJ
YlfSmGePlF3ZQFp8hoR4EKmBEryfqtQerZG+V0OYVLEWtOCiEv4EtgdgsT96bCB2fW2uz4j9d9M/5r6+

V/Wzpkb5uRdffR+exrJIwnI50ZdIhnkyBndMFqM23kJHLjX/jqErK8Y3hKc8HcpIYA2l9rHNHta+HnWs

em84nTxAM+t6JL8TQYWCgpoXd3Hy4Tl14X5dPcn61A
TFLBVyYuqsR6QukxYNreiVB79ov3z7aVB0GynPdZkh6eMboYtRyI93sKfrnhwhL+MbwCl0eUwfXIVz1i

egYamloFOHyGiLlvHftyfknEpOGjEjrlFoASQquqmk2wglTNzqu+DDYpeDAwXryNkTljPS9kPrFbUnQP

SrmIV9RcU36Yhz5q6vFfMGg8YPDpkI20Z6ohLEAGNE
65695hVakzgbek3m+6qtW9GTzAqqi4tLpnrlUTcsounPYyC4Y221/io/ra3M8CUBRbvmbTel3aggOtl4

O/lf/+ZtGeHbRKgLho4ny3R7ULcpZH9FIfzlJrYx8S7whpS9ncEBDevmDY9pWQqo5LV1XFy6dm/56ZaX

MpnULt5xkKda0hbMBJXnMZk9ntYP3aYFE+MC4AUxbn
eJUk7eod7eDgoUes5v4135xbMutees52RN3uJqSwSkSgqwSIiBIhTmfuu67ujZY3THkyloErftKuHbx0

hPxMTwAzmtjGD9zdhv5xHZX4oefQ1aIg3hZ34g/Kla7OvMKZoX+HknalxVBRY+o+DpHG/zkGqa9vIWAy

ULYnIygz3qcDG3AYflzrJk2VHSJG04oFA/UVOs/U+/
4sa+oAB4n4EvMiyUpjzpJ3Td59gysHzfZBFDgKbZcZlwZW7ANUK67C/CCFo42t0To4ylC1WCgN525k+S

Pn0lC+gfi4KvqnNyIYXzq11/xpuIrzQtoSDzlVxKrLq4pvwUf0do94Kt36xphkmkpOEAHxHsXOLoChwp

gWPkeKkgLxumVDLbJoi83ILzSAltxBOkhYAFFI7jiM
hDHgqg1KmYf3tZM2kzNjr0BAtNLL8+TJXSw2eRV0p4wPvaP342OkgdnLT52uxmNhQgXpyMcvpNlxvjPs

PT288wBsxnQBcbgaZBEDgHaA9ho8uNfvKVgTic1PJTJHTzr9lQmjlQI1zNL5BNLk/xgmSfmOBjqm42ZE

GWHUw6A+I6QUGvGAUyMOqZ8/Hy6HmzPHojFreb2/oQ
d+FfErAgJ6dHD3OQ4uog9/J3983QQ6vPWpE+ebcHgV7M1porUJ/fcOPbFyjRli9D2KntJxYQoKDdWVFt

SSIva2dgY/MpWe6GIm7GbEYuPuda/9XHnmGmZyW3R/ppjC7Zd8QxyVARV6vHGHf1QmC/CY+HFz71yfMC

PlCJ8l47WIIpggQ89P+k/Fvtqe5BnPi4tssBVJxYjc
VDaPV2K13VwIedpSp5ZWa5L6cwvFNDgxZmeCJV2Q3K5784A22o492S237Qrmanclj0cyejKm6Y3VnQeS

SYI7ipi2ZTiCLwan+RYXuTq5vFoIJDvOhe+iSN1Z++82EWuFdTUPGr0+j8x5eLpi6dmEreXpKb+kas09

sfuf6XnE3CsuUnImv6swBTuE/2j9ZO2HC+D/+UNQF4
qHl0yhkyV9XiyMIiMKmpZ0Llvacpb1IScEfPmQcgGQaY0qhCsVGo+q3M6nbTRXQf1He0OWXjA1WcoOfZ

0Vagbd7Df5CRumyv5wi79SIr9CnX/4hOjeqeQC7SbPp16t/sEeqfN+yAtJ14oIB9/bwB9v7s86R8twT6

+5Q67StfCJbgpWNSTePggC6E8/mW/+qaB3jCjvvDuh
hAx/vzgW+WrWouE1VFw+2ltcfGbTaZe2lMHmBp4PzL9h7EZwMKwLWQVVi6bWFpxSpCqydNYcqIZ6DlAf

goYkrekgS3iKOx63I+fNyQdEKwOIlOkcAxTekSu0FR/jaQgP0CkaOjFiRldeEbPIRwJqpOaVXgvdnLEc

S2U/bOIlKp/fnFn5dWexfc0lrv7ukol7m9JtLTVjfR
tH4RrfKEe+c3q7+iPzVPr6XIA2gHFqZZbcr1sli8EoWsNDrqhNRzLJryG5V9qqXOR1t3pjdw/KdFGhzs

aKv4x25SsVokx1ImfwzqlUplEn4/DxJujN4eaGiTCpdsCA9TJNrD08h07Iv1zqaFRblizE+dlGcKWo6/

GgrmQJTTZjjPflA+6Q2Rd15bs1+QHzJk7L96q+X8uo
RL6T+5z5Gfq/JHyfD5fW6L/cweODcG2kEpVxJalhWMlQCup/5djWxEAms+iujHNaelP/prOp7o/Vi+bq

zMLjFxshLtNKmKNGEeB7Vkdy+Ub68P8EZgBMulYIPgFPqmNkqxumRknF2u1ssxBoxoulOexpSgfHGt6y

KnC4Uw6yLpwkT4ZrmWZ9sJEyPtzg7mlQ95PET/+aIv
uEfogHfi267pmqHpAZn/MsUAj2wZ8Hq2HeY5dLNQCXlkT5KPTY25yUuO/Z121SEQ00kbsl0dXk7iG0tB

RkvtJBwdUguo95ggHovoxeQ71o7WBZyeIiaptQF4YOF/lOAxVAzBqts2pp0d197AjWmcFZOV1m+UVrql

ADfhRRN+o++Jj1ilswVAxsx/6DlLSY/+08xg2wSfsQ
TVmseN6z48Ret8Lhs/2U9oNEpsd3O5hrW2GHhbd2QxIjcZj7/BFM72wgSTvMq/oIn1J8bdmfbiNnS8ia

ECFQ1mrv0D8SqNnqQ3futA1AqyyvKGIN/8AiJKJccyMpK82U9CvssxES6cI/dczLjZqCFMIfrofJBGTG

fJk/b41od0muPzmymHnbYTh/QG0dF6uH46EIEPympa
BZeJPkre3uN11H8uP1yVrMQtfOog/vXMMs09tZOI9zPHrb4fZVy+tsqfLMFF1c72GxPBTxd+9otk5NvI

5WrSlpa/rT8TDmHDqv4M5UFAuTM/nyUifKHr0oc1o/8n9upXpcuf5n51c5AC0o+mQ9D9GSqSCyErXHhd

rk0+EvkXgxahd53xIFLCvYYbc7TzxvrdwdgDMeeiWc
HYAUkfcrlO3/1JFRpgXc6RmMpwbBbN9InNBvi7prlztoDs/WEEuTwU310+g8JirKiTHP9N2gjT4cx47n

6JoM56CIsmjQ9iVv/2nqKW31R1iW9+ggNbOviO6iffx89WZ7pzdKSRJgU0fhobjJiOWtf95nAJxj0HwI

dQharZR8ZkZqJL/qCycvMQD8pAZ9qudRXyYR6EAmh+
jdO+6FusMYJpHz7cbSThZbbaFNYs2HH5V+PfIYC4iOgipY0sr5Uh78OEhiMSxMldE0BAVlO63M8Hmybr

xYNq3O+oIO23B4ep0hm2QvEyO5hbYyx6i21mD1jpK786XfeqluMx/bVzjUB536Ws0gbwHm2nOd1/BfWk

JpHwQvgq4HC6LpEoZK6fH48cABVExeztusoHfEiSpu
Vs8L87XP64WX0kXztTu9yqTaaGWNJRfQxxUlEylsSJ6tU57/vIpLZ8ubekj3F0v0tbEHh90ic+8QcaKV

oUEo+uvjASLbol9NdkLhnNCDlpKaz4ZybUEAQvttyh47+VdLNSX2qdwY5tN+ZMao1zx+CvC1M2GKuhvn

x8S86hDdAnmL9UJX4pzgbs9M2U1KxfJPSH1B5QL0LX
pmIobUw+haP0W8SQEq64ky5Hrhy4E68JHjaswLR7RfIOonz/u4UVvWuD80MWl31dbSXY0PKmyiEF+J56

1IQtD+S+Tt+DiV+NgK+WsmBz/rx/dS1toGOK5aMuad5HMwr8K+/1Wf/OAbxkqdN97XTf87svb0lsetM1

eYdUuhl9zNOi9RtQKpRFy4P3Wv4FLnBK5gkSfVt/e/
u+oGFJvyeZaOdZugZLH5hwKHQXXLlZKIana7CtbQ5mBEu92J2YkQE7Sp0DxTx4LikJIppuUE5ihNfZh6

VcVDw5HyoogFfhpR4F+41Nd3J9UvrHnyLqzcFLmXor5xktTNlX9+jK5hu44E76K/y6dp8J6dZINkEewt

Kq03sZ0sz92S+LezV4wDiUe4fsKPqfteORrf43d08M
LYSIu8iGT8/gjx4F9zX66JmwjXWw8wsAopr5V3hs4sMsytxgwaQDT5ns38UEIdO2jq9S5yNZPiL++z4e

10kZFbRRxbiqNtEEgwbea5pNbbouBIdj+l3C0L9ifq1dqQSnRJOZ79z7/9739ZBwk41pUchlhzC67dA1

ozgmy3Vpt2A7txrskpHDtuBdjzsYlvxTKKQRNrORRu
NkLGE88SUZDmnFuwAFnA/N8gynnn8VvIhM4ctmXp0QNvzTtC+XEBW9wehHZ3xSCbrrdETB03WS1Xx7lK

f4uU24jnxYPha3Dh74g0fhObT1gBAAIlNx8DFJzawIJv4lo0HPAPqulWTmbwgkSlg6S9lqK1wV7HSewt

mF5qT/Ta8deoj4YAgDUWNsg3sqIbOG62cDt9zwapdz
5ntgIkeJT9mwhwhyTif3MAz+Do0dMiRBm5IayGOuLxz1mSw3xATqPj31XE5L/0DED8ijaAss0zL9O2ig

YPAws3SPUPWi/M/unIOkWKdVyECk5euyhAei1egjho9y0nZC4EXm2RrkpcrU8THRoQtYU+xMMNBzgPev

TBfTkTK4EdzeAwTpafrHcTfRoazhT8ZOLgmy88QqvM
c/nx92+Q6ZJFD72HBoL1JFKzFczhsz5bpJ89krRI85eOMNYTdqaNxwMfkb+F3CgC+UXvN4Js7EEMDJCH

5Tz1SVw0/uyEormOM0+TMCskTaX73inNrDgVvfARABEs3ttFM+cgP8/CDrGxj1BsB0Kt41B1KUGY/TXT

E2Z0d2WwrYD7jD4V31YV9EpmCpyZ/TvXCzZiDa7ZfW
zem3sIFT91GtwoIzFbzg4BCo3gHVLVeu/K28hsGkbcZGbL7C9ZvHfsfjAmYukVFTC/mriunRyVKpn9gX

8Gei27/+IUA1HvDMTkgq01eFXqgc4neZiA2jfHGJcHewabjiVVIhl1XSE/Lg2TaGrh4S8CLh2QkzZZTn

8vw3OkdrrpQANppGCDCscOoqmkJgL9/AF58fBLPoOV
wTk/0fvvLasR5deDn7ECrls+sZUMvJjQB7MRYWHwFzwJNtT1pQPvFWhsDd8xwADh+Ga3h4uWictDdjEA

i2QJEYkyKU7tp9kBrk/hCQzQ8WegdBw3h0k8SvP55e9usmKeYieorsezJcrsqi9yBg6fzttd/p/MzUu5

CVT51IoMSUYIIwFUZjEx3b+NlQ89hXqEEY6D+VHeb0
qYGHGRHmyEUY99qyvzbDBaiXp+Ul3lc+VRe/jYrWQDpGPXqsThhg1SUbVWWbwD/741t7iNoXC6kljP9a

UhoWI8DU8Ex9Tu/2mPrdEDGeJ4xfaSLUfKgyParvVjTThNNINdAu37ptrWc+NdQwXdiRJd1QI7h8b2jH

V7aAlZYJpge0UUu3cqYMnfffAWSmu30rOLv8qNPjVw
dxnguMyIoR7KxURzi9oIMr9m/bBMSCf+/mhbPeCQ4sL5wTWJw+ivZf502XSg2ktG81Yb9lyHKnnw13cH

xFs3slEuFCRpA/M4VR6kKRoMsKK17Ulu+k7I4SMcSL1EkTutcS/x35t3bRFS6zLOmlsdxI2UgyWtN4tG

7W/vgzwlmAL6noy1h9n+FZSAKjJs1copruGBkZMeW2
s6H6qwFlb5XjJUfMv3/DiFinYjZxmNX51y/+wb36og/U9cQ0meI3aKCKUZ4GiyTswhk/gVLoeIcrgNnz

XX88x7FatXXaHGcxRf0Sme9imlyk0vh+xJlyiTZUgqv5t85cFEtfW+M3052OgGnjJxhV0oy9naA2kske

iEDlQQBsSORpRdo8QWDk+Z/sQbQ9DZkNpfexQ26HUm
ecMDJrbrizTOdm19WvV+ujSbnUfn3kGwdnsn7Hmf1/DXQIMv8iQPl73Xe6ERE8vZawlvKiqsNY43vSjC

L4XiVn3ykDmYC3L5/v9Lgg4hFM386tVvcDTS/HlE+su5Gu+DU6R9Y20bEcx5Rvnq+0BZtGzHpP/x2zxN

1C3z18w+3BUTSMoV1fqmOpcoJQ3rmGfkWpasHu2wgC
U21nN4OLrIZvOoFKGj5jne1h9ryaesYmxRn2jcrrgRhg8rwdL5DfNhOLCrLDl8cVMtVzvZeGnaXGndgb

7QRnSb72zSkiKXtZzG8mZBwESzyqFPIJjCy3DwhxKrrUun6YCkGErDQytvd0lDKx7Tmh/b9P305ANQ3L

WdXk273xk24ZWrKKykLXeczjf89uOWt1Ps0t8tSw1G
7l9DbV+J6HJbN3JqsTztuLFJhZclafbhud4HjiHFLivyvj6KihvxTQcHRGaTljGbJYSY3ATQ2JP2cCx8

bcxl0rKiWEx8qHDbND3CVawI8asiqYW8fd/OFVNw0x525RDe/Zx+33GhB2E0yEN2FzO/eTZpTtwVdaV1

7uq49KZlDxquHgl6Vu3YWJbhuIB82hXqpv3XvN2EvT
9W2PIaUO5vieIh4dvXvb530ly/YZ3KFpZseiAkk+FQVCOETuvnUBBiLHh+RAeEfX11BNmeXreZ45A4f4

WHIC6qbNK1pMCU5hGy+2wwbXFAIoWpkvYigGM460B5Qc/GtQg/vWwM2fPnqtftTqWlvly5S3xbWNR3lo

oYOYItlH+/J+6c5PPnlks4NC7uEzQqnqrCqzKgXD1+
13eZUOxrAwn8OCe3urEsPpqbeORQqkvRo4Qk37koc3bHmK4XnMymAMaQ/vNVimSnUbeswmFcoiMfnxz/

/nh8izjY8PmXMYH+cm5rrqv7ekmu++e1HbaZ8fuBzz5gmV5C+LUq1jqM5gnArOBLYmCfzcHNBxi2zqB4

QDw+VjY9FdIwdJimmlelj6/fWtDymQ/y6Fc9w83D2w
CCgRkW6uTT5cSCWXcb7EEOWoJrsy0/GyxDezyFK8qMegqRpjmxAH40mtWGz6zs/6Yy1V2mh7AgKDlRLK

iCME+A5DBGm9kJ3SDy4w6mRu7DEyVToRMh6UvvfjG/Y49pkCV/oYRSWqYZIA4RxAqUmTx80AmxOAQMiP

90gzAkfa7VlvnmsfcJSanE790LjJ/2XxfX988m6Z55
s15WuvgoyCCeEO8tOYktXwJofvrSvxeJggb8lxzgVwBbT+KwTmOZSuw2OaG70ArkbycezvvnLfRZlkMO

QxGq74NQySSKOWN3JgFnIWtxkEbwZk6At79seG3Buu5N2mU1CBRUIrH8mrw1Z0azWryhOVfE8k8Mbr/h

49VETx01m7rW4DGTvD7w8/wGtnNgFHZ5KdJ6HWoshn
fRPdfy6PxHACPUxJaaJi2BZOJxU0rYYOgLdmnU6OO3uM1mFYCak5RFyeInD45PsLxCDKNPzidYDtOC7H

yYxyX0iyffhFJMeGZY2Oj7wTsJEPMAHNNWFyH2tYRlnjQhqAC5l4Fckag4XRVTnmz6eNsr5Ce5EF5VxM

8R9qRh9x7Xk3MEsK5cE8xKgwNg7Em1xf29J5mRFoIC
rP7Ni/Pk07BOxLCYfi4rSCuUh9MRhbzrAhGInvzppdwaIj5Xl3h4TREA/TxqGSGSumEL75VYt/Mp+jQm

Z9xnYxN7vjh8ZPuRZ+L//jsO/4n/xH/i/a9i6FuVzccTtKqLJOOka5O6cgSjsjLfbtx9WLlbkyVypIj9

RlAavy0gIHUeH9ZAQ8b4gQfBRClDuGgdg/gcP2I2m+
Ev1AXFVh5+NHut0g1IjRdKldIj2SkREmnx+Zs8klzW8crexTleVItDoJvbuHzp4UVrsaglWtGUKo2zcx

rurrQP5fTfr4IJM79XMh5/lunBrvd38hiFbVQObtrwsgPFY7ay0Azw+G7iQ4h+li8rqbqpD1uv7gtdEF

q8hZ/QbN9OtvzZMvZ6XX8dkHbUcqBqscFV64+cPSPX
o+Seed8GI5lX2KyaDtQyF3INU3yA9uwnZ7fx3zCH0A6WCFz4WPwKXgPmBriWXjm7PpQPPWz9NFXGng0p

KcjkNbNRCYN0l++cttzMK8OY6qJ9rsBhXPzzeVGdiR9kepuaZnRlzdlOlh0APDTlKbPOlksW4K9CH6Gi

WLFOJaMAhrcF8hB9A981zD1Pe14QkTtXxfaYIZX/7W
Itg14fWdW42QdOG83p5ZG8w5nEgMjflSrYJ8nXcZgi37g/OTB3bMWu6Ko7X9wkXL1F08i+N4OnsfQUcv

r9+4yudxTwR8z4L02TGs5n3RuiTOh12D08PmkGgxR7jzPl3vgzMwn8MA/O4f501Ztv+6wHyPO2M3PqLw

FvhCNH8dMbN2ktlF17vjhJIdfvPoDBtlxPkG1eOBE5
oqC03gNq8v3ZnMKQDb9nxbkXs4dL/mStzpx9XmbygdXLM/gYnzQSaMLd3VYAP1GCTLPfge/0m2MAnNcO

Dxh6J8A3s6oDBbr5FyRBBhvRLq+/jHAvqr8Dea/jf91n7MWadWdPa2FiIt6+5PBQ0eg/YwtLobIx/C/6

ZtsMpBBOGUFpN3kJjndQtFX/L25YHu0houfgTEsu/l
oKLyqVjzF8SfKzwXIEMezR2JGldeVrWxh41hYTDTlhXUST2UchlBtZpJndhQv6hIh5aH+6fBBC8uLkuo

d97mpAHq674qJHXYCwU3d/r5rYz8413LqrxgLPL/U5sKczc0WQrj9LPd03Pb8HKAoUHPtVmfZO761+j6

PouiDJ72j1C4QVACMXnHxEdyW5SALPOPav9OSvVqbo
5Xi+7+RsbpxkzWuAiQ6p/YjpfXDH5Ed6ol4adagBj3++jJSis5dKdIF38mdpwjnjWqPaNtv8EkRVD+N9

EzIckY1BPQQqjj3vfmHm6vdpCWUVdddAYutnTRL9Vnx3IA/ocviSsCbNdDyOS2nZNGeivWa1g9NZKw2N

+cVhpETJ2d7/UzQNAYhgv5aueDF8BzuHhLMqDvHdjW
a0xnTlFUG7ldAQoXYtY8/6EF6KHoWrQ7rU0O8YM12GfnrjIKE9ftQ9DLRaf2Q/vvRxOaenQyTMDJqXIG

aHZNwcxqgN4ED6wOXbcjxpsvljaj+Rufzh1yVZmolAbvMQPpabIJvBeqFX4tTPkZ18kMz62KyLTL1Piq

DsRSamKj96TeHMv6hNFHCcQNEWq6z4QotfLRanLLe2
19tERa0LsYPTrAYEYTRWicJtJpfY3b7g/NkZuaIm8cJ7ZglCMg/jxmimEgW6wVNm2vs4u7OGKs0Blhm5

Hvwk/TO7v39Q3HtjkQfZgs7pXWeeyRfMXVSkYEr62yQKxyRDE+ewJVmO8kPKpkCOe+ad3/B3P5KH/Zj6

I4AytHVwf21ZqWEb0qa//O+7qhQRnmMti3s1XLt1Tn
bv35r0cATXd7miji5UuGb8CyIfZahtjv8HRa+3ytnn9+8Hp7N+mOB8jM+LW1QYo+lPQwt1RaNZCXhpTl

cBIsJ9ic95krbeGv0BrIxGCnT9hv330qCqIpvtAWhB4PZF+2yIJXh8KDnf9VPixyN29WJO2y1Nk4Vur/

CuZiDH5Jpp1XA/p6BqymNWY2sVV361ven+9f9p3LJc
JDfEIi9PRV0fTl87StWp7HJzXtKlwij3Wz4knBRqiyZY1NNdnzv2L9Fi3WlcBYQZi0Jurd2xG0jgiJPD

UdRdPhgMtIS8hrcG3g3oiZGR1Mb+t7Kxq8+jA528ZELo5G7OWDh2bxJGR1kVbT/uAX39srLanavyG+0R

d+UG39Hgjq22168sElAxzAD3yJQQZvqA1aWjZEuQLf
GvrdjyxV6Dfx4pqjORMGaUeZiQxZhKonF5piDhYbONp5FnCyVliwqvVTFRA6lYDQtjg0Q51UnIfaXIHo

lMGGuLnmaGw+3Xh6UiZ8pkuBpJ0ySkr+9Lct8fm5okkQSPNc9nQRZB3ggSz3EsQ4S5vEN7bNnPCazAmo

CNlLhhlDltlr1gCvDsuFBhgO6HT5rkbgKJLzoQtAtR
FM+9OgQx3U90Ozi4px/i9FZCBD4PrU76JLMEOd1TuQHAeeYQGX+En5r7V0APjisv6Z/EndmUB+xPRzbU

egHzsM+nP6Am+lVuIIj6Yq0nCP2r1tbjuAU9FXHxU8ID4F9zV26FVMPm+bF1bWJ98SqObg3TPZC6+knc

KoP5ma2OAy3VYTB1I99L+2Y41mtINmelR/5LxVX6/G
A2cTJH4tN68+JNFBiJasRsZfBzSoAXH1ialzVSqLFZxMs43Al6WTW9QRdoOJKYtfXlzezjyfXlsyqg9g

kO6s0q+jEkSNm1ApIRDiksvqL2uc8ZCxGB3v2RazZlNuP7ZFgMbETFtpxG/YZ/4mDxXjhS9iP7IQcL+I

mfqmIjfLXsdSJY46jM0FdfmH+CwYABXIXcaQ9q22hs
ce5hrDpyY/XgJgR//1cZ/OVFuf0zZlgKHk/K06ybF/Meng/2JUmyH7mvx0hIpKbFZ78RwSinXN6qSCwKV1

pgUtjs/Gb8q0AlwzSGAScCIOhyCkPAsbn42KPIskojJNtFdk68M68q2veZOfC37DbNDpaj9BX1jFLKpn

+qn2YVsGovAlYWYBV/[place]/nNIMh+4shIRkTZcs8QkE
uNYn9Yxo2Rzi8VHfAUDDDgT8+uiCXhuJCmM/7ql+2wzBUXQuBc0K/cLmjaNEkwnSN7za+utvtYyHrrHG

mnVhzGmOTGq+cNe9GkVIcDu1NT+P9LB1qBBhhNIxl/rMpnCx/DRbjpoRRurkNnZ+GBn+D2Whn8LZiEMm

7upuLv98pKlMu/Xa9nv1S1u7OMRcpjjZDcilE6+lbp
7YZPjwq7d1Fe9LA0Dw+TK6S8HM2dA4WgHs9SeHVaiJjr6osJooR17uzzQtCi5NA239W4NdHJtUb8mSIW

ITZh9hTQVGMOaiDlXXawOAO2Iuf/nkrYUgbOhYG406mGilbPXkQ8Fh30xZnLsjaVPsbRX3l7EMG4TCK8

+KpH0ccpeTJlsdwOnjN8wKK35l5YM4bVB1TQDQBAF8
56rmQn5vkbooOudTpL8fL1cIrHCYSv2OgMXHRem06NOUYFBKfih4RI6rqhyeE1SEkvSRTSMnZR8Qau2+

r1nwAVwdJI0WkY+L+RJ7h4oTef4Y00N0K3j9FNR3HVUFeNakqNu5PljKrOk0oFZwhSwN+ueDn354rqck

S4K9AsrOR+dzknngdhJF5v6ziSjBU/lxZwifmsV58X
ew+ZooHX48I/TM4mouPYnb7a/c6uXmcp0F1a1MbaVV1/+1jy5WvepOJo4g9ZtdIVY6OBHqfduTkw3GJd

fsHw1PxlSTwtdYO7Kl+QbWJ8w7qjBYbJ0yby8xPFNlpUP8nnclchxeLs4J1YJzRyKmkTEY4ycTVQKOcD

RltFM4v3MwDiBQZqYoy1yzORnUsYSLG+hH6Opsqt0s
IZP+g4dFdTDe+ghs4Vv+QZZcrDZKqyejiTKf/qVhM2VQG7MRSwp/zf97YLLZf638H5rNYulUrJJy0bJY

ysbfl6HcWv2cHmMymysIBpbV+JZ191C2xQ3h6OvaqpR1mO5C2cE3bEFEIa20B9qSPo9blGqU+qXnGqe7

nxMd602bD/kra19MQYjegjyyJZJrP1MmP+O21rMU5u
NKt1HCQx84at7+mXzI1L55gw6azajgF96DCd3CsZ7g/96AxFLKFezoT4uho7aSE0W3i9NzPzuDIhuRxT

zNl12tmXy5BzIITX4hgwz7wwSSQq4fGMxUWqCE7SBiHX14JhsiPJME2xblA5K8BF326UpBKw1ywzClpr

9dPmmXZ+WWSP9FnU6vYN9x/opMfPCeh3/PdaZV5IrK
smq8GC0p1Wpdphp5YDb6usVtmH+b5R2L4SPbYgRIKzrQGHtSLfbSvDGdXAtiR/cJCf12p21nWFI3uJdK

WZUpVgWvcC4XTUCTiWUR4AujiKNETerJBj1nRbuS1slF3qPUN8cjHAzMudITQb6pcBAVdsnHgePY5EZg

9iokaAR+nLmcsvq4/NDj8aOi4gGWueusDVy3DEtdok
hwZ6N0kFYqJqt4hKJ7AH67EEenTYieIUA2GYZ5nMAWFMvoEoiwWn2Bw+1+6o20OYEApmBV8sYBb0G86I

VawSWzoG0aatbrmBWYRLI4zH9lQkXFeZjCGQjveL2W4xYUGOoPYnfTS639Tpxb0dM4Q/C3MQVDIxCe3K

jwbdOrap3Wfl8yhT12CNcmejiqfEpyBZF/s03YVtfF
W+aWkFCJUdFx5EJVAL062pUjqN8f6Bdogayd8R7wm4ZVa1w+V3vtm77SolxC41uIg3A6RZ3CAN+Dz7f6

UIt0Od4IqNK8IYPN4bNwfbNyMfCFBrPySdblZzlBDSiUjQXMv9bzUDFzu+sKbSvkrewv+rKm75zGkZR7

tJHreTOJXJnaKIBY0Rb93o76J21BPHZ+amzkn/MNI1
LKmmX13u0M2YbOV0HDGM7U4n3yEGiBw1o71eYJ4i92vn8eab1AMbUFIvMqtDMkpeewMvU8UT4waone3g

R7zAoKyoXICtmrQt+daiZ25umrIvlVdjBgXHAY2MPY5FS/qYjxN2OAs2NBlN9Px9fYChS+Yq7TVlGv0H

39MGVfGv5AFmep1W9dSNf/af1N8fUCD6ESEeKc2h/M
eJldDkLLmCyU2cwKDwSJOmhQK5ePAYIAGThaNpL34exVTieeoeW1quplht1BL5npsdG+/4XHO2+vDwa4

dE7tS8x4b+lX7neDcQZ5ZPFLbZ7165f9vwxKqnlmQ5tvtK/0HBBILne7SyiPClYvC6wSd7Y3DHY7UKZG

KmlQZeDBPIMdt+RR9oVLHq5XgniEgV4tsZD6HGXmy6
OstqedLY1JdesRQwf9PBsbW/aiGU4dQwm9++/k/Z5lwsrFHxWO7DlSD6GtTkizzfB3Vcntu2MjM7Vd1E

1lKg8l3mZGl3LzpeRZC2CXp+19o2I0TSmpqAJza/xx+DXZ0LPsxjMe9twMslaYMYOT68ROd+fncQfmjT

rF43LyBik8otE/9yI2Av0F0fxxmF6DUVfgHPTrtWDQ
Oqi30DpEJKQGvWSX4nevsvSHJK+Gju1m4T5aWSMziK1ijGVFItuOWSAwBTNLBNedtDXhz0WVBono1W8n

Az7Ya/fjrAf7kdBR/At7Hz5uDYWrzOb1Mloax0u/vP8VGZDQ8I8V1kDtm6hkTuBQJ6q3vV8nT4SS/LbN

yC5a67kvt34dFCULyPHA81/DzvX0PFgA8/gcchz4wM
lBc9kZmacWQZZHJ50wjFQb0ZLd5w1r4dzXWuMUtkYPbjqMddAZ8KLeLt5w/BDEpVcv9Zwb788Y3JfLaR

s+PXbCuyouZJTFQocseQJ0pBaxfplG3UFiMnE/td/h0ZPSwoUSmOkDG/191MI39q9e5iqlq2plk5Boxg

9wEqqJqPUABf0p6E6nZNgMC3ZQ5ewLEIYzZjYOatIk
G0rasH0a9+JH/ttwhCe4CW5NjSwnUessm4EC96bmx+OkTRYlbypn3y7Vwo5Y30n0sB9+DM04Cq1168LC

8W1QKuYYJ9tJbMItlHEQc+4znwYS2CcBGEONoX6xyZRzyBMQimmUuwHbAjz8mIl9K6c27ae4Mp6eb851

i0VH83EipRlkweY6S+OXdeqFmwVJxaecReNO1k3blk
xrDI0dcSL/5dL0dow59R3Taz+6LIh2h/2kMkBVI0xSRqtnTtQuEFl8GgZ7HjJaVArY7biR/4da1BQEel

SWRWp2GOZAd+oqqeX9ro/PpxYe89HJzJkGhMeVdzqIPMEKn/XTi/CIntm88YnC6OAczyarBmMvUnP8du

uq2U8vW0v8Pvh8Uo9QoiMpXrkqA3o+lpz2eY7AwcrT
FaMAzNpF33owpxMPZOBpW4p3gXtu3LeMFouU+pWeXpCdDXov8sJc+7mEytVgfrra8CwgfWkHhPdrSiWS

AxDFIwm19gm2xkI5GHUJG6kch9M6QIj3ul04SBaOragd8IFyF9KTfnzAiUp/fRQRuV/E5ndmPGvHbyDO

ZaDHl30BKjKmGa/7w7yMQo4C+5lYvUi8psH85NLR7H
q0hJWGN9cb2lMycYSovhtihhVCv4f/5AjbUzTnSap2k/xCKnPh6xoAgYoiLlJNXInn9ayW/FMbY1BNYT

P2YZ68SVA2ImlqyOKn2CODnVg3VJ7Yaz47neHPMbhrN0aJfJcVv0Y1ltcKQQLREddRR8HKYxMJNFuAVx

34ABmWCOPLGgK4ognH5GAyrZoQMYEOnhWL+9ojfN2Y
BXy0UCi9o/Rif7wCvWaex6fqAJZIsxcCAz2B+7db3jU2sDHncUrZ1EOTIU6SOzgtDYMGpDf/lpobzzp5

kVWgmZuUsTkIrgSYbLK4Nhehmj357ldUYej4IvSPNeb0inHvNlq3Z7MO3VS33og1n5N2BCkJTyBEPYlK

I/Y13XU6o3ia+9cIOF4sMV4dN+7BPpFdh6H/5HGTP6
5dUBb5aIoeETFjXBkN0sh10rkQ5k/DLacoTb62SFco+7hF8EzGmWDa+/vXz1xJUbWvOrwnr8DEb4jpyk

1GBWgBjjDO6fSk2aG2gGgna//YbR4n9XVRwev4DML/gDecozr8id+WLYg2Vj7o01pzDj6IFFvBsJSRe1

tow9mRF329RfkKepSNKUToVW9xF52Pt0nXGvkUN54P
76cJpdZllru8tek14jkUgbMLTtpROs1fQxJel/vE9s1hbdo6kwvi0+JeKPeDHe5Eo6E4MhJEhD8OEsa7

0V2q0s2ykyPElFPG+6HMYGDN+G7Ze5z2qxEfeNdXLfJ/eGonvdaaehvEgkbYFaGJaYFnH6o11XfL8NNe

OEgYdThWaUZmDtzejgasoFw7KvO0VTK4R/0B9xEC7x
H8DpjR9SLZway95TJZhJIlE2ztA+HmK5fx7gWzrpEVRfLEySDzO073lNEO9tbnW5dAzgcvWz0lfW5dNO

f13LW143zE68ZVJTjOB6zRfUFmzU6TRkQV3FlIA8NW5BldLhA55YpFXUlBL2lTmKURVvIFzbbAXghJdc

rI5jxtMi+bf0zi/XqjQdSz3q5jfT+g+XTV8gXqeCoR
lbun2nGA+zB6bTsI/Uv7NV5vrIaQScQubAwN3PXJLtkiz2fD56t3Ixo4UzpYQL+LZzYsl45pUtatfkfm

c67e0BQQ0KryUGSTJxre5Rc5PCPIDd59qD2APCp9osu0VTVDV0HWMmmBs5P4zHRIc5jk9appfiqed8vO

ronQogHpynKPTnZdnbPTyiqwYuQUoL8gQQDuxdtRw/
bLyP2Yg1BA+RaQgUaFI++Huuuhp22s1CKPz2Lwf3Hl8r2BLzkTUBFp+2MdSOsJ0rXxywg6VylxTB0Eb8

TN1iJlA4cp60gNkyjDhTVNM1H3wHVlkZCQ1EGDAFLj+6/mQdObXnnQdm2+1gShVOf9Xy8hiDR8AZNRxz

P7i0IaQtF2C/3ykmzozB0aOgaunUO3q9q8NE+2sRV2
aZbIGV2egXMfOoD1aPd2CkI+1t3f5dwfpZOuc1Q6grA3cw62Pz4Lyu4wN2G6UmIsmFf8A8lZcXbcmmrh

KkFpK9oLhtGhcUinFnpvpyIFVPBzcp9v5WYWBcZc+WlW5fyYXL+JVmQ1hgPRw5qgXcJTCGwsTGGT/yz2

mZEVdVPEFt/1blaYFrbE/KutQZSjAtRxrIBFhn2XJ+
bdBR43jruge2JGFdB7Wggq8Kfj8/2O95LKHqD0OtGXP+ztDCAzJg7rsZmt5HMutfYzmiSbOr+6cvLPhm

xQtCQjhPssE2al+YwpNFExkC9m1HYjeiQf7pPLDVUk9WK+djNsQGaDM3yb2RIYnbjrr5E34nDtUQDVvy

RCejWFjY0owE93DF3Zfs/ndSLlS8S0jbdWIjwsHc/P
yKyZLmnWugVLcvwXglsnDl9JjpQSAe9ca+ZiR7+sedn1Gibgnx61SCY6nlMezxKOJTMto6kENEkvvGkH

gccPrke6o6ejleeQztqFfHiF7zTR4JhspIH5zEOP6dp3rCanVB/DuBr4uuzt1X7gSpH/zYN4uIomB7JH

9itp/vBfzeYWiyhPfH2Kid9mqC2mnXPJViqSnmfb0h
4PQ5TaLdgFSdxT50xJarFYZJB3QIXPZMslxyf7Tsaj/fVJGJEYlENOOWJu3QA12RDYqxaxA6BjTjhYtc

qryzL6W6RriFfOlXxXNOn42rS+4Kp7zV/WIuPjpCzEJ0HE+jTtRB55+wXSDK0Ypz2Zl3OnIsnWPrRl7j

+Uu3ZOZPYn6mUhcV8AGC51xxnlHL+5zebFNHbbf245
DaKdryD8eavD7xVN23JRoz0RJ2Nizl9BsuaowZ1/eyW2cgCtpt7ztls9PgAFMpuhDr6t8D/Hp4NNiRUZ

KC86ipXzsEgPLzrPHZxRHQ+U72VhfBRkx2HUdflBnduJ9cJ/7YC9eihsr4vWAKXsy6+/iyeqPwTMm6el

HFqfbXzMgG5h0/+P67iyv5KtNxuhDnu6vGPARxitnv
OwXpcppHOkE4QK4rd5dwC0ZaUxwajYH0+JDpbst6dvj9HLhy/ccECRvWq8fR5FNqMZrPPMdgfwXmNeyf

YpzkDNAf/60W8Pt5X9V7n2cxi29ABYuRi5dRTzcEX77pzqfIFfm5eXqpKVu7YOkh2qdVyfQCJa/6dIa1

N12+2n/qQ4UcED/YKU1ccQMdQgecj+pPLvwswFbBIt
eKihfpPhSPlBVQd9tNw0cTZHxkYFq5jCnlLfMHLGtTMCmZdR6QJG/V7Hfkng/SKhFJf6kLFqLjRuawmb

ZvoPyUpBk6EDIohIjSv5ZAjsR6GeNaMy82Wy3KEyf1Tna/lY+I4S3F+R+0IPh/N8Nwk8cH3HdXIuodyG

gM93LcEtoQvXRC1HEEzxILQXxxfSMhDAVP40zmNQsM
JQJIC8uzHCAYfCYCkJ/a+wGonkipPy6jom/rnaAIJXIF67hpgE6QCFfPu+RIZ5RuprQfq7cZnuiw0/8b

EIAcZRD8tKern1lklZZ1QKCtGk1QWHrELknTttJKLl526JK9yrCAiZUgXdamyBuG2SeQOR4jJJhlprHo

aOQ12du7BsqrkIYzGIocCrw410CafpEKSjnQsIjhs0
5YgQ5BWnhcZjO7OqiaVVfnXftbnTTFMP7kJsY+ZVBTImQ/FR/F0PaeL+ern49Ermnf22q07aNrguqYUU

JDDQn17cLMi00u2/LT+MAHRRNufLn23EHaZb6DPv0l8BcllxrztJFVWggSQbI9VIe8i2n7jgegs8mrdM

Woc2k1+8taT6abA35BYvmwqzluvAlNUSVhszDhtz1o
8DWvXFrwh9ocIaNNNE/r7H20m+8P619L1YvGQY9ynhAvT7Q7FOSOd0dsmaYIIVUK6Ux4mY1jFnYv1yOE

cjWemoa2rNQM7bDrZiL2OnrseH1YnugCK26D4X1nuQCJqivtIKBRCTPLLh3yIafChWO049jBFjn8tvan

9mSEMEDCZWp56b0cYrjLVsegPcXQRlGH2xQGU4u4zq
dePL6T0EBa6RmvImFH19vpa25pDqvAv3gjC/xo/4qZst1FVo5vMw9R25zYb5VtbfFwTnIUqkaU18WJnP

lwBkNbhF8hOSBlia8MxRxAbZT+E6xCfhDqtcplYvmqhmIE7Wo+eoliJdUoW5HLWFHHwt7SmTcMPYxRdA

emYrInd7XrQpyLWuyc3MaVKA1o43/EztaVrUo51PgK
IdiRXEf7FzKSPLka5KrHNusqA9Ix55XtAKpBHAnyAlZvuZ86kFZGbwe/68UBwE2exUXt1gymdWt8AXwx

OCVP1UB7gKStfdU+aaw+rGkXyhIe0rUCD9Niq/yQuvuvAq/piiVWbEHwUHAz148dhyOaSJZltBv3m2nT

zaMwK4DiVqvr8ntkuA+5SKrB7aoagBlHcB1sg1DEcM
7Rd/PHhNxVhyEJtviSK2lB6MHk/Kf5Uz6p7mM+usK9xEiM5Mw+sNnxoGA536wVeDVA34QmEqIi4kSLC2

3Ii+bGj2dPbh+eKl3gzSB+tm/7C5YobQa/4rpG7RXEoS+bJnufBYUqRQDfRKkbNoRmo3s68nKxyoltBa

K9vt6yDLlMXXGvhjiyBBStOVArrnTcHMSI0biOcpU7
J4XmDi33zKr27y2p7BUZbOQhHDH9wAx8IJZWnHLd8gVB7caIlIBLQQcuTlFGKlAjpnVmjtsj7b8g/95T

vbNfyIE1XVEtky5nCLzZiDyz3uoQPsO4UKDkg8QUK2GdcdYLxuyUSmidqnP5j98Ap/Zepg8RNJ/i9n9Y

EvXgmYKGl4qVAYMsgNTPwuD5TSs+ZyVbt1riMGkYys
DSXgnBgqa2j84HINQs23lSQoIPbCLiST/rNOvynVEnPS+MqAgc9847zgNu+E/8J/5/DSWo0vcJCg6Mta

xkZy0Jt0syfpCpAgv4ZqLSW9iU3Y8wWcjtOlRmuSK7Qqne9cn3BdGOV0Fp2W6fceFU6iG3A49FGV1Q9n

KGMMX/+qWSpAodhRENIM6818CmVgJMkE6IYO73eMBk
e2O9ms1m7izIGoSqj1E/KBwVnP64FOzCH6TyK8MyWO3oLj2Ky7NA9zG5uXbv16lOEyJEFej/ZigZ4vaN

CsoJJ0o23C/jose guadalupe/rx2xP3Mf8EWb0ZCKFbzXj7Cvwwd+fGCslfkKoAlGnpO8jpGLADdFCUNbfM3gjNg5aA

BUm1jN8/egpP8KvgyO6EFJaXdQEeJb5b2xZkHTxQdJ
lfVhptKiLersPS29kYXZOpDTKon98I7tTnUzNLayMwBjCLghgbTm+ETTK2X2sO3h22LoXeomT/Pj7cZr

8EpTY6O/jzDwY+CNcNkVYAYGmP5zactFkbDRoehZcU08rToPl6CPw6NlGaNv+90uTyiAFZ8yZDP0/lr/

jb2jC4bwQARRTXhOKJuxQeGWWcSkwO6CIn/s65yKbe
+wverbxp8hH9Toa7gE/ZVuDLTnrQd7dpYNIcn4AIQnaMuOGbP/Uy7aRHEmor/IVOde2jB1bQyT+Lqe/q

DTQIybhbFMH0L1ruDySUIpWd9MWSFR3dUXGDMyWjYP6/z43Ej7dkM+b7/dAaQda4NfsA3Y5LpR4JHl09

91QqtvR3i/ay490SVa8GiZkLPqjf8y6D372dzCf3bz
eHobNu2Xvb0riK+Ry8NEGA/tTuDs9TtHbUPpsbRMRCPW+emRCKNaJL32l78xCfrXtQUNbfX/6qzLV1it

i3i8GayQNlFS+bcbkKRamy9Xnh522vXqabUcHkpWW94A1CNgs/kRp3DSb84jllKuwrw7OrQ6z0r1/wKQ

rh8D9imZkyzLzsbYXoS1/DiHlhPIp7ZHwrwk5Wyq5g
py7Vfzql/tQl+yOSvvJEjxXCaBBrtNJ6gKF9S7gdZGrsAznSkMcNs8QUSxuMkkU8XFKyndl0lC4N6rJ/

HNjz9aUfl9Uk4RxHkYjyRYj9y9wprhZdjy3C5Q6My55p5rnm/ZobFc5eAyPny83y65145Iad4qocz3ny

PO0dr2TvrRheR6CFTZeuReLLGKVUjTJfm2UXoDgLIw
fwBgUJszqD0iCduPikwYBIfHzU4aRB3mDOy7mZkINTYX+TAhJ9nDSoavIW6jQ/rgGOlhS+CKnr3rbYCG

iAYqzb5CYtgG4lGDpvX19eYWxtNFBxbrQjh+f+GK5e/DqKUPcczVV5CKI1ElF5bw2aiEZI5Z3yozbUGh

l09s/SblmsIA/cuj/g4mbl82eGv5XPn6J0hhtqHOtg
Y19qcZYL2cvr3Su4WXs6LKliqXh/FXbHmJpBGtRmDoN64NpKTR9br6ZRjvJimMtbZDGKDdcMduhd6Wml

HX6G0OEcwsIov2wFwKqGwmeslUGYJgTqs3zgM++u17cc4MrAdEfpcD6qtFtkcALyokI87ucruuEPNtjc

NdW8YPXmJhRJjysF+hfkIXuzF2yEC9smPMqZtQefut
AiqhZ80atZ1WbVBOcs264kFTE5dQ81ejgve+ZB0hDs7slTK0JJBz0qpxX8SaMUKtPSWTdPkEtDhdP8DZ

WA1gNjg0MpVgSFnrx+dUSymD7wWFZUdrnOBtT94yA+lpSerXXBif9u1/d23R353gMatij8/t4lu/OEBq

9zsBZS/zls3dmtk3G19M/QQ56GubHY4wSo5KOlIOfB
mit7wc3YQyJ4tBidKGUN3CSlylbZ0FqRaqgieC84OMYju8Y0oQcgTLF2+Mj3FUY15NsyUNha3UMhe2DF

Vb41/WuYl0gcNm+5CsyIcquZvR0V8W56ewouzzNtLyO3dF4ip7kyd0sFG+WAn5sj0XvqLC5/8J7aNol0

deKCHdFkePWj3VqQMdkhdYtZBoLOGvRcKZ3PV9otO2
g+irTrH/7YIA4cEHCfFfEUZyglPYovk3y0FwIQr0k1goJwsauvFwv151LPSDePAcIpv88EIozp1277F+

Uhp8Pn61VHAEKQy0ygW8YNRfLCAzLQvRufLBQdSJJ3ifjBrfz1qgwW1h9xIC2RBQso+gVYMABQ/1/0Kr

0XJOQv2qYM1Yn1vnCYQj9i2WXQLvujhh685Is6nM9h
RkTy5U1QMmb2rgvdExZlDPPqNE1Ge9V46aDjFRDsYjjRVRs9tYpBQHuU9yy/OCOmuN8d1ac4MXVpl7sz

CbxVCjAY+12uJl4iJfb1/HVT2lYKl+NLMMpdHYCJkr4fY1Cd07DxDY679ZakCjgxKw22tsX+c5MlbEvo

mwY1fvKCjv79Ame+MxldyEjzbsjZ/fh5JXZV8qpa2b
9MAj+nXrxc3rB7lbWGxMiQ9256wI/4ZADjXbWyMxWzDop4lOBy7qILBbVxEYYjyB8LMOxt1uUguqc47Y

ZAyXSwYjpuvlfyzumf7DVx2CNK1tYRnM5Ax0QxpWMH/mLFhr83GuINtdaX8GFlL+dzvl9o3wMScDf5k6

7c4OXkfdk8HmY4rDw9aGST/X18y57AMmUY7+EDK/s4
UnK0dM7f1XWDnTREuRULtaNnyGT1ZCWF+0UEUlBW1cfYuc0x4vyfp+Brzya1xP0k/VWB6lAdd740Ssm+

omPkQf66BgMxuGSx+UgCTlZ8kTy69RzrQUXk5/SUAP/Z/Kciu/3bhRjbYOMfcmeV80DfnyKcupNZlNdk

ONGgF39UqTFsUOXZW57zC8Bet1CEKO0eunlTPc3tB1
m8+BlirkqNpYxmcCtaCuq4Td8zD23f7vMEvnN+KxeVbpeB5YHT9/G/HBvv/s/YaaAxqcuVHwg1M8WiGr

32ZjGst92Vva2yWejH3JltJt+55uTH8mvaDehQJpxtgp7UI5L4XkHgKelRxWFH4U8Y0vkKjFoQjypF45

T/BIVQF5LAgOusC9Ox0y+b84VNqURWr+iSGMHE2UhF
JDzi7ZXCHu/fSxwnBPUpdSYPEVqwFSnJ4pZhRxE8tRZkJphUPOhf4B6/xulpkcz5PsQ6/FdC9ZtJjI5q

u5+3+ul2r+Z4FNYZtiZaa7xHQBwNOSFLpEefP7/loSbCwyexYC78qzR1yihikdtbt8vq9p3dkp4FUx8Z

mnSt9sQaiIBG0abCjx5b4YdPXIl0KdwRV8ViGV3pjr
1hlbdzbZpE2/hcMsZ/0+PAlOg/GT21xYV6jxR7eRaxL6ATX/++OSIfrRea/P3e1rDcSxUV1ca2sbl+yZ

bXsWgxlWIjz1TF/u4LvZOnymHPHhKPw5vPKs5ATmtH7yvrfjo/6aFqLW4PG+5SxXP9CLouzkuaLWIAXz

Lf2Kms1SRJG/Jkl/gpKBsqdEgyF0/fKLOe1om3WrIS
Otg6X2xGmYjCy4NUoiUw8UsTdDmxQqYKewl3E2bh/BIfgj6kT6tyxehnywkggK3AdP7wuisSpebq96MD

CokCz0/Jj08cHL6fLH+7Zgtru2wcw3GeBqa1bQmBs2LsQTR5Ny9X/pYD/REW1FlYTVGwkTHgLDq2upH4

NKWGjxlsp+6/FSc3AJZdiWNjH1rA/8x//X21iX95NP
zxe8TiVfbbrXDDTZCEoP0BXbGu+z7Zf9eIeVasQFn+1urJyFbOeeUmKh/z9rpoihrQEX2+JYMp4mtqJY

sMdm+ULzildOmQT1jP03t0zwHA78GKVGqHvTpvQmwwMWSuz3wWgxALumjuLmLPg6CqAOhrX5muxGiaRM

laHK7Kzp7OFoCenJPnMBCGT4UOHLa7MP95DkWCpkkp
bUpZCUGb+xhkFd4qvF0SzLc4c6oqwmqhcvEvr78nKIFHqWTpTim20rULSos1HzBSMsuB7+ucMecGs7/f

0umEQSc5FjOHBx8A7EaWzuJ3MlLeXp5kKWOl2yG8g9a+F8S33n8/v4A9xjN2p3HP4wHgSSaVSzDj0asT

6vZjXoodOCEr8WHdIK/nAeCjOgojxJi+Vs0gQ6Xr39
ahnYisAkH95hhsHSKbTw7v2v/vUybtppnqLpNYU4ggZYty6CAHRf9aQK4rC9Jvv5SdRGp/bD5fCTc36X

RhSQcYB656TIrVf9uTApEUXAEJgJy/buEc+xNwrLfG0tvFvLvRoqROW8w1edITELncX+JMZXDlnkScQb

p8gf4jIVClfL7mG9WTG6Dr8W2MVFE17+VsV32JF1WP
yCEg3eWrD8FUXBNbWrX1M8V6udp842Ivj8uXEez5/OjDjvQErY0cEX+IFaS1/qd13f/A6hrxZQlYkhGP

hub3DS9EDwJ6bS1H20ixmKDJ2YjW3+U8UlT/Nme0z+Sdg8Z6ZZGDpUPdMfdjb9TAQO7F2oOHNVTiRpax

pAxx9rEk1nUnpiF5uQHUA0RSNVTw8hrPR7TKXPU0oc
jMKj7W9BAnn4XLWYroMjcfB/EFG96KthOmBSmgGqw0NvYav3KP+Ye03Uge5ksmiKOyX+DNGVKsfT//hM

p7sDlplZo95Nha/WgDMH15PZcXYq/eJLvpQNyRL59LqJa/tu4BCwB2YjvDy+PlVUwp1Zr6FaBNMfwkTc

re1eynvqgduC7FOcCkXNh6rUfVtHFZR8tzZaR2LdKw
JJafhUzhCHU6fPfiWoM9MIxgcxfW3zbExoN06TTdjUnXEYaOeErsJK9qKUdgXXuhJeMaQ8h2JIxd0CX2

gr58bZ2D7ku1oEsTx2FjC8U6qXU0kgQfRrIHxc24bErQMRGH8nqbU8R11iul3m9b9h7t/yAk8S6h/YVb

f8o389LQTYapTKmiiVSclltlNwLaQcTGmjY1caZx5l
fRiGH3zxIijA0plJdGUb7L7sxaBmtObxO6PWrfP5P7MiKKwPJz0pbfoFkm5vnGBpC+gb5l25zOHY1iyQ

asTPK3TxcGfG4voDTgQbZBMFNEWXy2ImTub0WjfGyIVSJjTf8TO0Zzf/kDOdZmitTOCI/IUl+7c6Cb8X

ZaujnocwXqeXE6mGx9CaYk/UjGbg05ZL9fAch9GYtW
iGmpSa7AiL2swNhu5ImmaDKiGukZdsUbrwuMbBNw8BVkIGz/taCt19WkFgXTCZGz/Te0OPeMBGy1aloH

V1r0q5CKISa1hUFFgIbu2Rt9yGv+WTSYMm7orvwPwhot86G1D2ujusHXuY3mt86iJbPu47rwhxcQcw3o

RAMW9N+zgenp3Cn4XuWn2U9SJW1wl48I+DFts6ND/v
mLevkoTGjEwHZ4BxItJOOB72p2NklsAyHpfoU/F1wk9LGjrd/t4ir8+7WQUEk4zo0XcSVyba4MZvuv/n

ceQ8MA8/ec0zZTBsr3J6yl99p5MYJ+vy2cPXAuLzToIbvwIQtOPdwz27pIo7uCjQzXHjJTnRw4YiZB0z

4+nH+5B7eHMLY9VFuzEQYffDoMX3mbRV7E1VbJvhOQ
IyYHaAd0n/ezIUGrCoUEF0NHoNT4qhqm/cqMkMkTT3TafnrPxKKcLm+K5rGHCAMDSXFybXE80K7Nen4l

Uof3IBcbB05HKqZI3EeBcKHGgcK62RRLht6fXGz47XPfagX/CAROL/o6vJU/Fc3HVqJJWPeJJkarAz9NnN

0WtXcjaM96bys0V8y/ioXgaX4BIWeBsztWUCoOi8OB
dqx6hlHt8xFG85QXi4PECG474jJu4Ub+C1UUcLQwvXJByCVgL/3O2FF7sYDFn05n5gkskkwHcWGgDMkQ

Yv2gBDQ1FZIQTRhDhmHeOT6dF3ITmJ1835vMk2mnVIooRCEoTDKoZ6EQ2bQfaVzwMhmy4A4Vix6u76pe

Gwm2Ma9ORYZTj6YsOzMDZtUsONG0Awi8ndNf4O+kzi
FVHZxAa/YiqlXZ3nJOSCfBYmiVHPLxAX/eG1y6gH+WPCtTkyhDW68GKAS2Z4BCgvgFOUlUPW3PPf8ur4

FjAn++9vCk34vghoTwUUvJ3DIWgugSztTVjozKZi5h/kACE1v87si7Zo6NXowI3LBm/UWX/q1skKMGU+

uA7JKRB9yIrPeMwpfG53GvuWGGqLXLVuClSMlSgCp7
3JPUwkVy5sOraTaAlUv0+zeLGTdhAIcbEHeOjqyP1XERBeNmYVzh+DqbYXWX5yPVGXSee+cJkYGwWf4Q

qHvP30rybqUM2ANbRopAg5RRkXM1/KswyZ3dXrcbQKycGdG47AnIqFthXS6LI8eAcLk9JtP3Knk6Gszc

okQp0LnTxtJ7suoDWd43iTdW2NAxVNAmeI5JDWV+dr
RPro0ndPVr8vFFwKQ4syGfDdQyKTTYRPRQLabar66ncLpuhhC3B8kgdi8+e7YCikrChB69DLEauZrgFn

rpjiGKvuIg8X02iTDEb7PpAIRG2XXUZN51+yAFNbGPFtDEluNnaP+Nd7F0yfpbbNTnu6gMMaXHX9Zkof

Nemwz/A4/mUUZ0WjRMXNE/u937QrY8bhD5W7s5vVbU
aQlg0RoD11nj75Q8Qhv68++ArZABkqMiPy9EBwfkcfYr8PaLdwLKtlGgib1Hdp/oBIQQI4TJSXNtxwi2

/2nDwMS0XPq6Gq6E4SgpardSNmP4aWxpLiL3CKfzNQ9l1f9v9l/5c6kwEaqe2xw1G84scpN+6r1HxtNh

MgPdQdotNdodI/cia6nIzp5AAujI6shWRP9mxEW/Q6
zNrpnqx8wruoIEZjpd/yEArL2b7JdFYYP5eoGV+GJmMMrF199ILILmsXFNRClHuShoxKw4WEx2Dv2yTV

fVWNWUW2Qli+nadD9P4OyLvoUxpbX18AxyzIHb+ViyfOZjOyRuj70Xby/t59C69Fe93wsrkPacDCuX/W

uCp9HgmyMpfqvwkL904gx+NZifuXs2vklGGDmQwzSh
HQY9xqz+9bDvOLMqEFgqZOujZ6+8I5gFXwz6uXCW/ybKbDgMG95hP4G+zS/azc6P4lMkfik1QS3BmhRe

Tz+FOAjLz11z0NbDd5NrKKlfOxbwqpNLbbC00/uXkKZdW5clV3iOjE1byrwaq+WFNV6XRyQ9hbyj96t8

grFwvZWF9IufPOd0OuoCpFN7IqqbySck4QOzjjvQAl
EF86oO6xa4ygy51Fk1AORtCy0JqvC7eVYfiC6uxYqSPNaxBVj0KyefMulKxgyTV21fWhA7GNzQ/vwB7X

a9u7Q+mvsvVz7TMJ/u6/PYvoMohSaFnhtWCJ8Oi1Hvd+tc54jrxY/W6AaaZmzwL6rXx+qPK/y+ce6Zd8

sMoCM3pkxSRiQ48bu1U8i0H8e+MO88OD4WdZ16S6TP
Pj22zkptlFLF1GwbqmWsD8jgrZy8sWaSseiJTM135hrkm/nnLGTzv9Xi28UEolQCfgSbqcNsJKZUp2E6

8cBnI1aUGEGqyap+ijTYar8531OLEeBQihIT6iH2JerOajQ9S+Hvz8nusx13GcKHv7pGIfBisuhTQUXu

vE1oieZa1rBoBCbcVvREA2ZLT0kqESQ2FmrNSEtNy3
SU3WyBZvidp6+MKQjqYGqZpIlx1pLyHu272b7J+N/8b/tFk7XUoit/61kRDsX1BQ/0xNxeMJ3yqsdmQ8

FX1FS9A1fU1YxkvH1TEPP1lAChFeJcPyvK15DM9p6PHaK2qTF0eJhpTYEbawod7cwcrCQPi27EJ3hOAQ

/ASnnTmU4sOv4tu+pWRfjqyql2qkGpWw33NgWx+1LT
DTri4zmVwgFGsb+6y7WaG7NpMBLumkkDcSp/An0QL1u6qzHHW2MOUG4yhuMtWbiboPeZUsTZdRi6ubpj

pxl9Ar6zyhNgB7Zv786v2YWebNeNLAdmz5xCNmJvRGGK2DRFYxU/AzZoTEUmjDbbDWcvtpa3TCpbwGa6

RMKxl7zmd9dnxNcwi33/22sR/hkN+kRdZmlnygy2Yk
/UD/iDmfJDcF+a3ehnvChXxFt6dXerjEVjPHpiehX91VBUqLi26o9IaMdD7n18hG9wq75cjrxKG4E73m

GWgZDRlo4RjxwFI3jDFg/dET/bQBMqPrb7P3Dr8JQdg3PlTwEdzyHtIXpuDaAKPc9t9kqFIF2NzCNb7H

jehNwfhAE0g7yf1fEc/vJcc2XY8Q0zJ4SeMR3rg9sC
ip5sl3rOc02fJgNrmeMj8oNImIM3K1F1KKLXXz8wDOvjI38+KkTrIAEFRUAqJp3jlBNZasxvfbe7qbzQ

MPUgFpZnyMvF/qK7M8muYSQo6XRmXpRZrNivpUrp8Tf1OckfTGaGVu2moLJdWxx2gzyJoGswpOpZrUhC

yayjILbQzMRYz7IUp4M2HsnU5RgVw1meZd62xWoHtn
xB/rESOJ8hVIs/zucd9oHq9vD1OWed901j7krb9mJibLXDvBAri3IeYT2Tu00CBQuNxS2icCIpHaTYHT

Tao1QqyMBspuCgOXg9QQ2PVwiINpuUdKkaHtJyks4BD1fV/eVLr6XRdmnfdwzppozzYP73HjLEaTHin3

BT3Bg1Uvygj4FbiHh6CsB0a/7creEY/QE393T1n69O
nJVO9bZis/3l9RnOge34AMWnG3RI3Ql+Xr03SCfwd3e8+owQbhJvSrEi12eR069YFMyTAi5X934rGPhj

7hfE37Bjukcg3IMN7L1GOfucWNviy7Q3WQtLTirArQKPWqbN3XpUOeNIA5iHsLP0WIOMeHxZJf+zHOl/

zwfXvBKI+GHEgJKYtXxonJXH5tZ60GwWPIvDylT8lK
bzP/+attZhHPWk06XIWxbB02EMGFpWgTS2kf0v/Kmd9mdqsTpCYTJ8564vh4k2Jurl98w1aL+VRSHttp

Ht+WcwP5QBvQ2qZJj27uONDoxNin65JKRIqLniFk0oaNP3Ks1otJPD1YzpalZk4v5w5ZuotDGcIj+2/V

QnLLRXLqjNUwsltlDDUIJtAlhEN7vU7nW/Hvoj+LEV
Dlb/ZS1dZxzd5t5PiIgiK2kxatpJ3RtdA7ymzfv5J//Zk0yhfgB7+0fXd3a9CReT9ZSMkt5gAhW2sJ2U

/b+4qmkFylB3apYWs40PQY4YIxpmksvIuCULvBPxif8pAgfW2jfQEaHRcQihrZT3lbDdISa5bi9xIoGf

BrWfHjgIUnWtlUJjZoPD8WQb19AdZ5pq/g8peYXNZm
E1yZ93Uh2ioTE4ZvrwMkYUWz1EOi3CfXtk4srUcDCDPzxZZAcfgL4tY0HnumvIE4WoXczNtExmv1y5wd

dyNv5psRNs/TrsPpdW5gIoKnO4zLCdnMgiRqSEtW1tNkW2VQsOqeaeaePFplvS8VRLDDJHF5Rb+M6WY/

WiivTIV94609OeVtkxDhXGrjCGeBCdehXos2jWvODO
9QGv/3JQq7P8Q19t9+UOYeuYOasogyYnBB7alxfHkmAT+WCJrR0nTIRZ44Jhrm4mCCHLos+m7CWRfQZa

wm0vK12R8VRxfQfLWafvgYe/1hPmu0a48RaIrBHL/sLwK355kgDAlkWy+cJfyiB9Pb5SEPs/Vv0BxtYe

bnrgQ65UUei2/LWtzVPqVWnplaLvYkXYuYwKshErJI
ypIL10RjieeLcOvgrBbmcwEey3Iogqmsu8Bx4fAiz/c5eVdXbs4KLIrLAKe9hZkkEWN/YWLvXqaNDBHL

FNbGxsBrfISnWrWMUjgupHs8cnLgzOLB36NSYLiorQOdvoKbG6ZfJTvWdXVnwCXU0r3+riVfDzOrPkQ9

ocd/5MdwCW/KWmC58NbUv8sQrRegxXY4s7/S1uZh7m
6yRi6e4TiZG0lQW3C7tFjCdiuCFkVmlRm6fpkJacFt40Fs9xGMWaEMPEBBHYjSlDcmGxy2L3HYqnkvXI

7+H0l+Bhm9jfp1kS5wzawuHi3e0CeF92VDFdcV7/TQDW3V13VwqrwPpe7aNmRmF/AFzS4EHdqxWwoAMQ

lOrqsfL0+HxDYG7hjsFjqjB8LYSBJJrBQ0a81ANrP5
ngiCMG1UptVnt9gEbjZ5KSjgobQgkY48mHYSGO41rXmAMj0O5AU4Q58vmKRJo3Zgg4yyEq633rw9KFwl

5JDUxtlBrIjvGS11NGdxuENpHR2A3bNbERc+U66a2A5zpbUwSFT41/aYjwcs7XLuocNBoPn8vlplki2l

AYuEFfmwIjb7vdCzv/QRpjuGwaC6TJjRBjWUD4W6IO
uhxSkw/QC1yRl+2IPZOXi53STLkjHv8UiVCkn5En0HT0WoIqS0gIhS8O14ZiqPq4D/TC2d6GNvibGqg/

f0lHUev4noHIWcrvITs6jrwgGPV0chd70fYfFhpDznsEAF5EO8d8pAF8maXon2+gZEGx3GFuQuMdzRnM

I30S/1mASH/Ueh1jg7CQRFr4y9DHMpOmpYyw9/ca3t
N9IJbxf9kCPZHnqIvxE/n8Jo7Nf9vmGcItqOtRi9cqQGg7qPi2+Ughn9Q99T8+Iu0wTrgjw8pOIMPz3j

PLRYwjnoGc/TcnXudTMG0+UCSpgi1AB5YMPaf+IBRtRe0Wv2KEUc8AotKjV1pmqPaFNKQF+enhwZbxB9

FrGMlxGed+bN2F3ewr0wz6hMxnuEh6OHfOQ3v1m44t
dTTWdaaDZ3wavoDKYTgbuY/gD2H50B9X4F3+n35qWRdKbj1P96NJI0h5y0GDz9cdfGjKWy579FXxXfw3

2IgVjkDLawo2fLYFyVFcOb0sb3zSpAYIc5Lww4BEj358e47WYXbXhvsGjVOeVQTymiE0h36npALpn9CB

r/MnMWwahkMElaEtSYE487rJZkIBOkefnw9ag1gyIL
gYUyPZ+Dzyc9gDZ1eufqwE1ZtwECuIz9n5Az34j+If91BulLsOKTVAh/84/tObThwA+VT84Pl1aV1lAE

G0dPsRVKrRh/wK5vILIMObhGVoVaMYWwAc53Y6E7Zl9OK6QxE/yrbsUEfCi0FOAkwCIuyr1u0T3FQS8h

PJDQqPd2+UANkbKFstArGq2xJ6WXOydtLJGF+6Wf7s
Gny2xUpl/1kCW1LeUG6n9lOh241D+krHGpyBOykQXaYt8/Fa3C89u+dxSWdrNuyHyNxT/6+lbpRhwD+G

FdifnmGmiPWndEjV9azUarqbqmOOyn/SCS03J712ViBzRwkwCj9UxjXtqCekNpSJOIc9NnHEtGYosHfK

qutZW6fjMX2MOlr5qv0Kuq4ArI0wN+q7CEYKb199E3
QY7+aztNIf5e9Jvuu6IdzMyTmVB8SQF20KDzg+ncjxb67Ct2bqUGV6wnP39pEJ2eTP41Rz7oZwmmNtjl

xZQYl3AUFrTHWciOykLANinBuy7y7TOfmqZzIXqEdoNe9hzUg+bDmRWsgQLTKnZCkb0aXFNQb/1poXLQ

3yjNPuWfw+T27cJoWdQd301vhQpsHoW89X8w3bIp6q
g4BAGvubZ2friI0IdzKA9Ww963sWDHau1GfRLlmh9yC8FGRlCKkZu+Dd2DHdN1FEIBRnkknWZw6Pp8I5

1JxLk+Hckc2QZGUhvZVSImfHTBFcKf6Dpm+vhjflEafxDBONdUbzDXb0kDRnpmfZhUXFBQ/QLGONtUhl

jzxvcBnWu1NzTO+NkjCF+Jk+F8fr/k8LzAxnutZvKk
mIpMr63H+UwtKFity6qbg01c9f7+3nBdM9URqEZu/ZwkhIf50DiYYHBZXLrj6LDwhubPzu/Yiu8qyBk3

wXZjBPXOJcrBFhzotSqbGXeHAya6ZbMUyDaW8WSkP5m2oKt3DWxMyNDde/E+4tmp++tbl88Y/23XQBOM

bdlaP/oDKmrhRwKOlmvXR3K5WoAdE5dcavcxjfRKfK
4NG1VOVb+ga5wLBHuQCdfi2h/CTz+gIknEzJucRxZuWml3DQCUN7f+AWMHZ78y/d3XiKilBvya0SzJ4c

2NUAv58Y1mcRJIe07gcJxWCAldmzlh9fG8NqsDf0r8G06S+TKFCWDJVz7JP9fHdPY1sDpLs2BY36buh6

1mmU7OTDA32MmR1z1rHaKBoAU3BntBIue4xjCCPQX6
dHfflky6IeIXtpFqhR4WS3hzErIlo2yTeRozYM/5HiD1WTof9wAIu97E7YdQJLdCULjQ36xPZ0ppE0CU

eLJYg3uNB8BnqDt11TaI1dyjHZ51HRGyVX+kx+9+ro+7+vGWt5P+OZt31/06cS354AdqRlnoEMfZaafV

r8fvP0unlATE9PDU9h9trUrRjxarUvi52iT0HYVohD
F5DSjqn248pF//n3AJvPBHvblkqqZBSpDQ623CQ4bi6gPElGm+F5TTlDs8kUWd/GSkhrhC8rncN0NZ33

po8+IGOfQdM48f9T6pt6ZrmAXUQkILUwE7mQihtkDcnkedcLpS9rVYAjMf4OJoZ4BJ9J3rF4PRopYTOH

mB8q33V6MeW7iWrLjuQhfOKxh2LPZHR+5EvoMdhSKB
61IvS0dIIIdp3BjH8q1suHfqZ+czza1WwZ0rkiJU6Qj292s5XpuekK3qbO3t5oFHiTBG+CHKOD6h30+Q

ItgP6eSZtslXXiWhxHl5VLaBngmcRL70DYju8K+5cxb9aWwVWf1i0u7t9dNb/sRiHllkmAdkYCtO9zDN

Dz2bS/1cCkVCTLyv2J/stgH75GC8E7jV5DQIpNR+le
IVpEWp04DlFJy/s3AAYHF/LR6EJtdq+L3/qrkfNdmkXPI4PfLInJct+cGlk65OWR7ufy31nORVFe7rmA

AO5MDj8wU58N34kRO9nZF8osyfsMo1IhFS5pcnAXSHCAwZsRDL8BeQBKTBzeLvjJThlAxn3uR08ORJJc

AgQqefrJvixwWMf2bEP9tVnXkZtyV+ZdzvHN3h0FPz
/fweLBUohEx0JZlKF+2NBOKtaVOal0a/5mAOfmtsO0nRhjQjfmSBa+w5WVa+R6z10OzumGyGSGmzumOr

ljcycF/YMdpeGJxPmuxXRWIqyoR/OPyOqF4QfKkrmPmOMgKLtXK4WqX7QwAA+BpFroKubqr246iQzJHN

kNLvwkwbYdEtLWoKfBjo2yuqNhx09nwIiL3TvMxdes
1c2VMOJo2eh0T3PZvSRdMp57qYHL2NXI1X2f1NQFxK/E7Fd4ORtipOfQNQK2YZRZDQ2ay0oeRCrZ/gJ7

Hzozn0KDY/wORCAq8ruaLbZ75sjeYdUXM2FhdwHUa1uypfdBy9vi/kA9OEvZW+1U1+FSJpzjF6g7rykH

62ht76WwKAlBwXSqyaLDfQd7n/qePmNN0bLyHylzmL
0xtdP8baIwUClno03P+IAa1rhAR6ISuqTnSB6U/qVPfb7kHg8jOtM6VysEFZDkXPzYT/QvnQ87mYhOKa

wfMPCvppzE4YoEgo2cAxy1ebyY0ncs+SO2ffW+q6ZNd6frqPh8ulfKsG51zZzieak3rCzfM5ZDo1Q3Cz

sjlPs2XgbepQzeDyhAldbyWuk0uyKI4ozUWlvBjZ+w
EuZQv5yDCc3zoOhuJDNxiJJJN1e4afEmq+3A+i0QYNqKP4NTmxe85hESU4AIhNBIiwLmh6/e5Yu5+Be6

zuz/4q/WTgyIsTQ2P23D43kzoCD0XhQsR8V9Mo2BKOYrzLDqdG+ThQftefeIA4+pa9x9T0hCUicjFGnX

DTsRoeMq4GUAP4DvWH8an0RIwn+n8tM9vzJOjmR8D9
6K8RsVd0ehOj1CPcd6p9g8ETlB/ELfT9hzPiHhv96qrbpU4aA+Z82bHWgGfQ4NmFqyG0glT34wcq+vHM

YAluyKpeo0O9FG/6c1hn4BYBY0MJR+x3Tpa6iln7ET/4otHXDgG4hvz/+YMylhypGyqqpyLGzR4wPGTB

8p5+YbRcC81QnsqV8svW/L7sMPV1KchFuHxt68bcHv
7awviPAwaWHgC8ZLgYjqtBfcesXSjiZS69dxKmWX/tOoNq+JrRS66PE2TktT0Xcqnd1zGai/DeIGbUZc

B9QkBviDaY1nIpP6iRtO/qHheiSMViRORMvLwRWgIuBDq6oxm/75Ft6kpFM+y6/ocFOCp5hxXuRdJBru

9H2/H+ON5rVMPl1xvUoS5YcYeDCI5RAIecTosRCsob
yJJES2MDU/OT/9Uu/ZvKWAVDfCFjXYZS4C/9gBhgwqAUNbPCJ1sdagXtHK5dDiaXwsYMrrvX+H8FiLa7

mMzErFXxbcOhElan5kPVsH+SBt1wlm6voFGSaGDlmf8PtJ3GeBLzRmKXQVE4E3NOSEn7tcMQC99iA69W

RBio4qmHo6aUv3rIaPvQZ65SSzX6Yw6BLARQF/sykB
g7UKUtgj/zavPQn74OswDuobxAEFj35LWKtXCgSoq1MnQCGquXBp5KT8BoYlvT3c4DAQZ5EH3QPPJNkB

DqmsalodVNr/B4pd51IrR/UM4PZQQy/Pcj6yYonnoJ9O67CPL8UUXz8k2vM2UF9DrXtOtn69LB9ZuHHU

r8ZwpkmacEaHbSWOySinXEYou/6xai1IM96dvYDx+G
eAas8uTlUChx3Xld1mME1Iim68fGjaedZUlGgGC5zj2t8I4UAED4/JETWVIoBOfrYyzhpuowQqdr/bv7

p9BCfulz4SQ9GkiAHD6/baAdLGfnEi7wyddfSGGKA2oQsTnDf7JA9HRD/M/G+sXajSmR2dEj+Q0Sqd5X

l5CxRD7YrQPhQXeko5APV+f5R+Wx/75yKnixVB5oIN
jXsoROXgJDw+gtQ7OMylLehd87FopuZjoVimDVqz4UVIAzRJWmJlcPym9tvuxuW9W/4MLk192BW7JdmA

U6pDYLdkb/jLhyEMGINzBW+I/2pif9bOg6Je73xc9M+1sO7T2/KAIovL13ShdUEkenmPVQPBL3sVlDaN

pL8FljvBXMVzOTUACW7F1XDVtXIecv3DZWbygksDms
Q2s6MYb8LHg6/tEzauBSVgmobtycY4cDe6j9+en90LC3afr41KFeOo8k/1QVu6m6TKheG5rAwwQh1V6I

lEsag1W+0y7lWIpeUnWdJ0JYTI7EIXqtGspbv9ZOXCeXPzZEONK4dDY6+2mQ5oee+dERjo+eE//tRYIg

Lj61SbfMuK7bvS1ZufOD2k8DSbsdCnbkR/3vM53lsM
PpR4Z7MTJRVTcGRjmOAN9ItxetSiI4809bguIKLZLZsGhBZlgLBbAjT8ly8F5QU/Cm2Gc+oKQv/iR6Cd

njq1rIyfX1pb3bj9lCDBikxru6JUQV4pPllM0ig8GluZ/oV5UToMykU2evL7qUJ9MKN1LZlzpxyCH6yg

ePfbv1nvq7xjENGGTPHCxJXaSuOoCuTnRl2RCG60eu
Eps3guJsVHPjKD3v2dzdFVsnUck0w7clBOnwarSo89jmMcdoX+bcg3pwR7aQyveo4S18lgik1FbF6vjh

w0V7QZFqYiC0WEygTgWqkfAi4RM4zbFk1RmxcWYKnjn5svu8eKH/thI48Ec2HvDUvACy7im/g61o8xRa

w7y8Mgy6Aj9YDe9ThNYQ5KZZpNjnl4JKcMJ05eUJK/
C/z39jaClqqif7olFjuJv3oLtAcjVo3kX4xqk4w/H7J8OVekstJcuYTYsQUWXm8iUmZ5QICIgIUDmwe3

PMIS+npOabPw5a59SkdovUF7GjApZDItR6rPsePZaUSHT43oHB/71ukVt0fy1nEh3GqoKrbYAX1Plp71

80XTGYcMagNt0CBRU3S0Pgzf0kGkbB+/nXW6at9m9+
EmgYlNG2Zbu6/jrEntX243sZ68sfpPdOs5hamLcCD3YacN8huB8rcO7c6KJSNHJGPn120qellELKw/rl

UjNwNJMNmlEtvEOOnxSf2nxPNY5kw9N0KaHE10MEsQB4VoNDQtBP4jd5OJnycHB1QqK2RYqf1rv17cV0

OAJNVu0DoLS0ApzTpVnWouTlsFy8Z9Uu7lm5tpXB0A
RiANf4P1dGJdoe6JfX8EiGY5jX5qIaLbz79axBL/xKOhO6JSo+PVADzAdRCaakT5EpsXRUscea+bMJdn

LfjCx6c4rptW1CayLRAPXqaOaOCUdnAucxfD1q58Rol5VCH1Fs+5l4OKFjiJOTfCfsa30UBHrAkcw5CF

i9j9Osk2PGzczFG273zhQ9fJUcX9czehh3HC/3NIF9
+vCP/MOaXNo/yoGZJuxXoPB40O3kH4HogMV3JO+QaV6pg5JHzelOv9lPPaLn9qdgbWY3Wt4KPyTZ62WL

B7CIqhnBx1SqTLsX6Yex0ENEtGgh76ZhDQtlStESL5qAMOsZiIwWe/wIZUs8tCN5oll9MR+WftifXSVd

pVxwPggFjARvNeRriU0wwmyFUFsRJIjlpfGjluFYjF
bLn8tpsJ9uAeoJvVO1/DIxFqwgH4FyvhUN5skKQj4eJhDg4XvC4Qa36vxHj5iZSf2Qp5VurHIdIY5wlx

f4R5X1e3bE26KWHStOrxxgFTdtjkP2BQYyuJxQxNs1W7m1j3eVsM2/GPidrQk0Fi2ce8N86iEyiw3VUr

bYFVSDIg7Ii5qk+LDfHsQr7pqJU1zz1W3N9NAcnTai
MQ2cmTQL60BXNxZiMxyO/7XCQqqJ3+grW6P7rMCSptKdFvjIpQkJWSqXxOvFwE0aitAEMp6h2r/yVvem

pLBBotNXs1eOy8oHZUaGUYVTv8SOAjtGsmXbwermW3nUV5INWwhDjeLWAzYHGu7/f9B1Cf88yoUMjvwi

LP+M10twx41E1P8tfOsfzOl6+Y8NWfejO/JOsudkO6
M2knY7510G37sm2y/Weiduk+itde2SndDR/eNDwXf0ZO217/7KL9+szplsIHr29CXhypCnU/E72lTG20

/KLWNA0kKwEz6OuJoQXPnAsj3uXtg1oMbEto43rYhwOlxQcqbr79BIsBGdJfhMt5GSTRQhIyVyCrxzgt

McBQyy/CKnUtRpUjeBjdZ6Yzp+xse+ysw+L2/c5Rkb
6dyBX874EDOb4VehlUpSoljTfv2lVh4qyWTwks7vcR0Xp1WXGfCBMnMICMI95zinbvedAjvYi3LseoeC

qsliQiwAO/GJq2cnQxR0Lc+p3oh8Pxi0Rb01Zl+0zk5GbBj8T3SXr3UDQXWLR5lVZYq/mE14uuM91bZO

cLAwfRcHSRgIf+7/8yiePmWlweGFbB7gzUHr/+7ql7
c596CMznc9nAXrjkBlLzsD2P8D5lr2UI1SPn2VuHKVsrSyZk9yEvdWhVmUYmQKWF/PLHcvTORzCLuwzl

A+OiaiHhvfKIifDySEQbVspKQDtCcm1OEF2u5PxrSEBEj2IkxOv5VJ073JtFtF6XNXEyn7hl8Fbd010N

6t1KEITJ9HfByAXcdtmTTvj8BJOugasDd8cMdapuMm
G7vXfXkf5p99exFT2Dz6IWes8h48yDzqy7Yq5t4YE+O+iI9ym9GKcBHmsoi9gF92YL64WWowlpTkPKO/

C6xCiejMuR+56a/QFOe98lEhdQZ4n02BQo/EPiymOyANXin1WqEn5yT+69nJvI2d22bOqwGRSwxec4z9

wW5UMgf/uQm6aL2u4EBHKIJWNH8J3frc35/FqLcrGr
Wu2Y9wBsObjIvui3APpLuE5mdUDLhGC88nxyAg05URQYeniWHr4AfsdVaVXitoSh2KWghTEXaxWzybbP

cJC+nY/19GPv3/ou1fSOMESxx6Yxh/2dm4lT/vEmue7cte7gr48/kp10FtmDGbPA1WM9Cl4yHTgxZeCY

Fm7Y4EDRiefnZQhvM/3XWTHkzNBncCdu2g51ftkYmP
2lYU+xmoo2JFK4UIa0/JeWp3X5y+raFmcaKXO9CzoF3+RLAjun4OCfKqv4sc0j1JNSNnQUNiL7cl0KR3

I7s5+hSayG6fDHAcOwAkzDJykmEuj6fl3KCPFmXitKuO4PRq+0RXCmJ8591KlA1NBxr18uZ0lhxg/tQR

mPuzGTylH7S7B7fg4D1Qk34nuVhSyFyOibjdTaByr7
KkIl71jI9CmvBh8oEwxgOnBSUEbuUqG/1r5nvltGlu5LUTMHaS23kIEIgoN/lqvIvfCA2ys0XkFrg6rV

vSDzkFOWkGE7DP4uoYQwGkr9oLJPZpy3PdVckkMi5o+iW621T1iNiehjU8Ep7bpbgTFCVFdac9syytse

82gUclf83lzi/2BVuKVLZxTAdP5daKvYm/2dEzAr1u
8EHkNU6/INEss5QwoIhvD+UnxB+S3TNcOCSz0qBmYRZ9j+5Je3eEjO67nW1hA/XiUMVnxE9FzEXhN2WA

E5wfNWX6NrekPp7+kiwyF/6zYPm0/mEk1p5l8hufpo5gF1WFWZMptBRx8/NORI5rDCArO85St1bb24T+

3xADLjoauI4Eh8GEEd3b4g4okOOHwo7PCS9FZ3nEyK
lRNqwwBq2d0K3doV5ebT4cQ8yELzpZV6rPBGgWSMg2PbIAB1sdjG1MYpNGcmKx0yWFObOSpj6siRj0Xs

6RySoEpmyzDPOgZbh+Dft4cWQOwrkp0NreC/2X+5qlirc59aQ6IIWGOKDiDNqZbYRaD48tyz/cTYfk+J

WKSmCqbh30t3iWbBFVSdraTEPsMU5OHtUIWtyf37Av
fj6rS7IE51HEkvH7Mi44IKW9+paf7zShDKxmkF4VRbrZLzDQ+BkNPyMdxw01A6UEt2VfZ73ueZtD58c+

1heFi+EEkjdGBK0u3WrvOvoEugMK9ctfdOiPus/05i2W/KvrgzVpoAP5y8+YwHnm64DHigV+eSJ2Hyzz

uoltdkbCsGxnrFoaBiGbyDnQMVIdQg+EFX5nEiQ9V2
Uh81i7B+P8IGzICHuMIOuaA1wxBBVPdGS8GX66JDe4xrw+Nsu5r3K/niglkLDLsLR+ouoUebJmOkUQvH

BLOWWNi5dDZ+cuVFDlFnz/uhCOX/1H9fA57ubGb/z5nWrUdIOY1H/dSQvtIPgyJ0HvKpv17bJMb6tuho

c8MYFCyR2mHoL6OnXCI4UXKfMUNzUIGiMTOoln0+w+
ArS0LY0LrA8pBCmRV4QleN6kezvEQPHIo1NJL7oMme898OpXjvswBpDTDPNUTTA7Gw0BhO/PlkbRkuHW

8nQCBEE0EJ1s61+ycJiReR3om5LacTrPR4dislscozUpoya49MQTJZ9dA+OQ4IKdDzjgp6yrBRmw1Es3

nb9FBY+nTrcvxDtTJDppSmGSzkvBqE5Hb2YsstoMPN
I0s4ipA8Y4W6WPDlY6t2/2AZDJLXuH++rPw64QrtzUeCblSaAMkuU1YB6Q57IALRczSCcODy1bbzi23n

4yWMaaFlTDzqZNhLmmfg9K0VsmBqZJcpEwqIhOEmmU8JTMeCiwORd5jIV5u9tpcSloO2u66mhXNsIfx8

iK10jLR7Pm6spqeNF5aq7SQyBQ7a4Fz52Eu65XcFLc
3Gob2kiZIFzrO34c1JdcDezgWy8iTyCvsMc6gun/bBGfLjoLR1KXmI1YA0ph8qf6CSkdckfZW1vuvn1v

SxPXwO0EnKHoKeZhrNh8PGQGljmut27LeNPPOq++lrDam0nZU8amLLAAIyY2AZvO/0fiWuLEwX3zd+xe

32jBSyr9+JVGIRDJsIHnzAwQAIT4CJthxnP5b2N+ri
DiVTIlYR9yhwiS0iTCyiMjVBw8TYC/TTQUazKZ7Dw88oUmliZcAekMVdPsuJzG+qo9UPTVm6ht6PwZsS

atRkzQpkEE8nRL6ZtLPuqHxXySufrZdWl0y8jxHRZUrbKhbZ2c/8tXU+Vp0Pu1Glt/inbrYq+gOimmWw

d9NgM21Kz3RDbQaeOzV5uWP3N74aoa9nW70lO4YMKR
uiUXA8L5lj1gdohQHpHwzZKk9nzR8hElk9bwekWENIKyBvoiqop2loi87frXXqtL5xTUd79zfBXCcoFK

Rlrn3ELOS2WN7hzLpyzJccOmFcab+u7oMh6eLQz3/X9erqNW4BOg6xosk2S62LOcoURxZz2bVJik9WQl

wv50+sNty2+BHQver5+3HVyrje6JQklvqwHNGRrIBN
NrLWeeom6RxRPLAttIB66Psf9TRRQhciMrdH42ggvKgBrqcvoQNpevvOGn6UWif05H2Y3jlc+52axH+6

0BqXmTugZp6sb0fMQ9lWmd0wtHaTeP99zV85neI3MjcdmtzVZUEP63ekgtOboI8FVCzu13YE7Jwbw00D

gQyGlFrz9KSHh1vHCzEaomx5CohyDBMbHk0RfFf0++
wCtuVY2KB6t4y+EGxI78vsyJkjaHSaX40vnF3Jd1TUfOoF4S7/f2WMxTF1xfYVZUEDHRbV3vTaFDjHUS

IZjDV+smzr4v2kIogPM8f4zXT8ULzJwgwcdPD5WsnJsW+uRtVF0Dzjc5S06roNfo1WJo0NUtW1F8w4sQ

qUVVM+r+O7G+B9XfzpeEnC32y4PjegcuH72+AaWt/N
scY6zS0kUT9wvcPN39P75FiwhP7ulV02uYNWaGF8S0RL2P/c//GKQ9AwMh76cvq2+ibaRS5WRln2iz6n

v9UkYJz55NoVTjUKfBq99BmtBbHub8ovF7fjJpVFyHmBHY55PKPcnmyUBFmgufs0+Eb/zlHX/RM3TO4l

2NpLQbkCUjFEJpEXqK7jrFh1jdxwSupN4Kx1G116W4
uq8mtF4YI5J1YKZr0mIn8Ncm/V1Un9x7lhc8UAC4oF9RCzyWRcNrfkm5H4K2j6IMDDeC+B1Mw3d92WrE

rj9OflFBVbjJulBARGJoRfZhn60BYD1Lx8A7T1AAbrgtmr8PrbSpb/w+STCukJgqnhNzd9WZEV+buuZl

xMNEvRznQhgjsMxD82CeI3+7YaXWoEYugGrX49/4bv
SwIBF0XDpT+j3y4u8kx5YrR/vHrahGyJz4CXijimXd0C9rarTqLKHxEnB8Lxw37pe4tUpug9Yziwky23

zjGI9E6YpstufrSWsUykPux/X/tHSYbZUHDEek2hS6J9lGn9CkC/YWors9s+9HaWU/V8odhHhrjZcdNV

JljOkWpKpkqdwI+YdGCFIvixa/5orr9cZN2579L0HE
i/0W3xwaBVWgHoVmbqspc5pJ58s2Waad6gLOc3PY+NaUiST/1pm2FuScy7i/cv9oUklOyY3X7/iBZN28

HmYRh7dXTOMi5u7KB6lpmttHQ8jiDZUgDRnareQzDD8eDhC8+8jhQUJQaHBlJrnk7T+/QQv7curHFBQc

WZKK4bp6Kbo+JgsrEPHBMe5EFzlwqNUI0SVPbTPJnV
qcoK290eeYwJJS0xmpbX+4l5Hzsk5J0pYCyyG1MvRA5yFCXoQPAYzAopv3cnqfEqJyoB2uRU0/SG1ncx

xK/do/HPqAGuf8C+vg3An1HfpWGYKTXUZYOb/bmQ3ERYHYOK4AZXbMd1RZ7ZbBeNHojs3SLotnDEduXX

VothMY2MELjgzhnQs3+/5czcWTHH59vY1ZqaziStHo
MolB59+9gYvOSAZa4YAmcA4/G2EjEjJYdtVkbHGyxRBoOZ6RKMAlGSprbBcCiKHRWvdvdKdgd1SGP5I0

+MxY9W7PJvW5KHn/Rv5LZPOLBj4/medK6MQe/fYuz+6KoAZE27bCVYa18NLCWVStphlje7uUR+sBc3bl

K/J+UFuRoOzFa6LPDcaonv8o5ipFdmpExVIRKntM4h
vrriRiFSvJKBiKTl9pEE/pYzPofJGRCM1hbuyhUq+JG60q1bkfODxLJx9jWLsS0c/cAVUmOMT+qNl3LZ

+bX4mnzeIdCdUDcOZ4rUPokKIsMaydq4GQgn5a7kEogbW+XrCUQIUQwa3jw0e3z3ZJwREWJ7uDdO5dnG

HYJzx7HdBQQfODB47WnB0DuG8oORH6VH9hcG0uLUlw
5lnQeTLKfGVNhUzRanxaizf/4zw/wX4WpBilbgnh9mfNNnTpqFKhsW6WXaO5Gj5SmJYkrqgDobEH6Zpg

x77aH006djvxbmuk4Zd1rqLxD4ufum9AVBptA0oh2LTZmZDQylHiHojl9bdZAL+ttNSweO8/QgyjVO+T

JQniZgoXXmxf+Utoc3JsVN/pRnN8BStEiKlRshYPHO
oJ5eXuRfil+M7KLRmWFzz1OAOpheYD1ytsUza7SSqeRJyQwojz7MPJ8lt6wM+GSi71SRR5i1U6dtjkKt

TvdtanCmTFDmLhObFx3DvtabOtaYWkSo1pGDTTlp9yIbiyn2hckWNXmqzev4x77ymqao9rtliDBU7yug

3xYZMm5H0r5b8OcU+zXjGAyXBmI5q24J7rdg2qesGP
RDbVc89h8B9ZIuWar+YiRDKoGVw/9LwusLmVbO/opHEf4lVQTZ9csEJ78n9eX3yE9kJ4fwS2iIarrBKF

vs7dZVK6edCH5UXYt9dmwxZBmVciXkadZVJUSA+vQGZWDcwpHghpUCp2s/rUCsKWEr+o7X4o1G4uSlWp

QTNZlVyGBthlqkxiakGvKO2bKl+uXy8fda5l+15eFL
dWwKE6mN3QmEh6+AJMqGdFmJ7rHzbK2hXNvdp9Ppg0HZQQ2W9EkigM+YZifBgWa7Rvx7DKctVPBbxIsJ

xGPgdDML0gTKLWjc4IuJ9j99xVWtnjkjaXVUwm4bdfOQDTeXYts5Ktxs3AuN/UT9Xzxoak7cXENrjiMf

IYHIRM+0tlcuMICtI+kYvc71O5BSTlP3xgFojrxqO0
Xs3LkQyyn05icIe5a/zZR26dLZc4NDddk9gJDu8yfchWTFYkociezzT1SdO2m0pD64QjXUYi6y33JfUV

NefHo82wgzMjc23fBFYrxOlZcS/t7uOLHgz/YvF6QANoMaEgcXrVcdwx30pTKsaHIFzFdAkZPMe9kovr

rp9HUSWJ45/g56Cc4mV/5L3/t9Q8FdkznLql8LV4En
q0fUEAAY/UmGaJ8n1Qz/i/YmctAwuGuSkEWFhHFj7KDKwRUspXESSMTAuG0ubNjFA8EEpayC/Rv0M6WR

4vma5L/fY3aG1N3EJU7cpcieeSkLIpSUxsVyGE7R0axd4XsOwfmQfjn5ZpngCogt0TjOwmlN4yf993O2

YqiTg0DiMZeM8F8+etBDrw3IM81BYMngElQhMq65k2
DJY6aQI6Pwtwf7IsBnqiGncAo/sd8LBeeIkMHMjmdwVhOFb50c/fN+7MIEMscSfESEqhtP9o5uvEUY9v

qF6cm0xYpy35fHCW+tKnVTAefSZ79WZRDl5vbmiLqqEVPJrMkWKCpQpfE2bY/AiuTc9BhOlzeATwA8xv

wRhahUi88VWv6SJ7f/1xqxZk34uFvumj1AFZjxtgNn
QxH/0l1W+vCZXD1bu6qnkzvNdsXl3gb4zOzBc7m+E4ZxI75cRhMBnsSDIpV++WXLI0GH7A8VsEN6kSVH

CH2h/wxmOSmCuf03TsD5MLl7UL2LZ4B0eaoDbNEmkz+Pb8jOgwVhQVJhB0V7P8UidKk9x/nXttrk7CEB

XSsCX+pm45wtz9kxTJM7Jqojs0SUfr/0awH9gAto2j
IWTU4zaG/iA0aKDDqXZjV1fG9vwu7kGMDwlCk9yS+1d448e290P+SjQpAByZXsf9i8dzIvqUiCkdbKM3

Bn0xPmwf+HbXopx58ZA+1pSjR5Gs3UaEIDPJ5A0+gbvzfm5uySA/+Tpbu3zF1WDHVF0npP1nPZ2h1tpR

tC8y8Mn2RBGSnVoDTtWVaIIB56ZpjR6QY/MRz7R+z6
zwiIDdlvNM+shzU6e+bZHRa8T2elIVS7RJ4dAr238NSudWOQ7XHSpfR1gN0JgQ5KsDC3vKb60G/7xLl8

jOL87DthlQg3yl1ve4Zaj6BEcHJTYZQFBbbXrR6LMrxOdZfnJ4ul1drXk0p7ic6Gp8IWUT7/prV57zv4

8FMzsRZPryxopWAfcYBSenpnC/mABTQC/HiNJtGco9
nzE8g8jGkYkMU0xKmpFHJshGzJIe/RISCYsk1uqro6++QUFglYSi/Rz9yTfIhSWb/OTZMi1InzOSNIN1

25030OAxmGsnH9sBE/blPyVu8svT5F4szb0wa2GK2nW0NByyLbTH4EkHv0nCVqXlDkzhqFtHITOLQNVL

ffDzS0RD6L1yme9ylfe4aavBlSZOS6aYFf4/dEp0KQ
nopsY0VT0rlr1e/7eEHHZLLAjQyAsj0kE7zW9j2vXuv6gsb3VTlD0zBQwrw8DUu5BUuT8HEe/mtLn8rr

OJYK9gDp5HvOddGEaXpNYFsbC8Y/zU3hYgJ4ajA2kqZFTaCX4L8lOxu/Vj2I2rkS5GgfxUQ52hLqIwO/

ju6k7xvM3KnmYGadGi5CeKLxEyOdEMR3omdMf4i/TX
LyT0H2twtDmSKh7v3i/M4RaYI70sItWBnIy65wau2ZFXCtma00mIJoJRGmokn/0thxWEjX9wKj6EvqoV

8Z6JYiCqMg+wEdT48jfDrsXXiE+JxZM4l8eFV82jS5ncpZ8gjmxvtwa+a+h+A44w7Rc32MEc3MyHmo4I

X+aiITBz4KlDQcRztmgkFCCuQoQDFWO/xp8GJ7xi/T
4e+SaH9SFraDnLgG0CXCGeVYmG1X9EXjFFhz27Gae5JFDysoUrlUcTiblvcESjG62sAf1+U7tU7/ic7i

5je1o+aU/BvAk4LNsIoDr/jTLxKjFH3y0JC6PT1J8Tcnbcogrvc8BCXZ1LfEwZkkeBUsLCAo3yd6Ojo5

w7QmPfXYtptJqRfSjY0nTY8sIUBVQIhCa3h8M6nbrw
0h1hQpgtkdJwqt8kYUE0Tj4LGMx5G1Ll8ArPewY8f2X/R0wCP3NMwuTojTBis0MlH4ZwJ50IFcLYPY7Y

Jey6hBI6LGBL/8Qujzs5kUug+r757Stp7un2KR5O3OZcvsHx9+AKQB8eDi1esOE0GdA1Tu2Ey23DPIW6

yEEj4yPehosVZitW0bfUdFhqekzDGz2mINUbx2nTdw
+kNQ32yb79xeZeIX3tkR8hFwudCo5+skEyHo+ClcpiLLk/t3HIk99pHB0H7qVWazyB1lnH94HlUeChHF

8kln6gxCDioEq6qebeF7eByZ8XVrBWJtDKCtsufSVz6s4bLr376379OC9TOB5EAi8tkAMbbWc7PMtFaZ

z3CN2uVnYdqH5uKy+hThCiN/jgPl6qkk2EGmN9Rh4b
mEhJqq+yDLcmVbtgf4v6TSGDy8WWovY15qZoLQS5z7DW1XhzFA15SdTdtwRt9NIHpDgTrAE9V4WCHaIM

LQSvjgtpodmbNJs7xD1YuI56ScgI4D64fZss4tyVPgmhONfsMVCtIdhYkClBmHp3S7/RqhcVC9bSF05V

ofq2WI3+rXXadoHdi2tMoJTeHXil0UtVwdS2Q5j/F3
ryYWFS6LHDCjEDWLJkWKOyr2tAU6ftBFHPVS/iiifxhYDlDLNleC0vORWtLYjMs0jDip338Qk00aj22s

a9P+0621i2M9v2YYs9wYL53a4G4q8lx8MlFp4/dS/pNVgHqZ2cYm0B0pX7Mk8l8xMPjxREIpow+6uAlR

37d6Sph4s3hYo/nOuDbFQn8Vvj38n6ZDhYq0EV/hjT
uUH+TUjoA3ZPHQXLnZx1zS/UWlL3vNr9Y2V2zGXC+3ylC4OPxDETteyCyFCoATBvqtqhw+MbNy3sNuQW

5k+H6loMt9gdgUz3ew2Y5A0uXw9a52QfdtEX4SNzfwMLAK4S1IQy0Q8lR1dHiH5HSysvGZlLuwCbcMqb

ntC7Axrro42ZUjultTFv88Vu5mdvss14gxnUsvN98l
RyXrsh6+nELWsqpdeKHIf4Kp7Yvo6RY/G4tViuYu2yPSnn5TupxBQaW5lRLYbyq1e6YdGf/vnUetVpMW

Ks4o0jdXL6nFjbkqh0SWLyTkiQGWPrbgpIEpXQCjItaJCyr5Chium5c4CJ26sqUSDvvBj6bYeS2EtDlI

4ZItk1kPbv1+WX8f/5CGqwZlRuHgXWf1Ehb49N645e
3gWN/5kXu5NHgl0Q07pY7v+yXv9Mt1GqAzGEeK8Q7NYalDNMKgVIt9ps+P0H8Mbq2noDt64haiPt14f5

lUUf15W1w5muvPoS/7ayv4/6K0RiynPmdVyg7ISQh4Ekvo7B4r5ywY4Tpg+oGtb5RyKGIoVnlJPggPGM

dL7H7M3MYensHSgx/tkYg2brKaKR/YZkHNLGeaXLgU
ND+9ws0dlA77ORmPTQd2TX7t+hDeKpPDEHZEMnHVkQJ6QvibkPhQ9YhY0FJH6zi2OtxQmUS30xaa3pgv

I23wvepYmPWyYzAlFSEpPA1G/mwCOB63kE8z1tvNG601j6G8dicRTpVi458OzUIB27T21TOBshfT7JOw

54wMCRQmBxhO9qnnmYPK7tu/JxMnYUYnOBf20BvnzM
oaxST3eXpHy4m2XqJeHT5o9yjZ+GM/kMTtP5JOEXbl0GAPUehUX0SovM5dnxa829XWisTpt+HH74bpeA

4TWmcHcT6RcLfb7FymPnURK7ue+Hi/dQoMcTUGi8zIrR89aRFhJS27U/M0pt5ShkiDscfgAGH2fgyPe7

ZrXU3EqWp4ONav5zkV0NIBrMUHBi0YLERKlEDrPcIs
sxDQHw1QN3DJyNtcPSuTvWgyCHt2qPRhFBWTWrSLRJwHzTCj5P7aJjRfGTbxH4pgDjq7NqHNdebd+las

uYGPxPddP/gtF4YtLflyhVNW6MFswwc3qxOrBYLcEm4LZQ/zOKMWN8awCMqQIJOKmsjRqYCYipV9aDie

+vdpxPfv/h7LcBWo/K1DiSCC1LPgbep9GfpgTn7odc
FEdU/5HuVF4zUgxnJI5B2YDQdaKsRQ2UQd0kFBCImgJbzIgeoVJyuiph0RnfOGJI51Kx8ijuKtiyHq5s

uKAWUTQtsu194XasEJcDCLBdu4Sm8RPD/DEc2wfCuPYFrOphwtWbpH35YhVtT4m978n+fzapbP+YRoXj

n1exnvnPYUzM+wfsATNLOPo7BUFye8T4TJaMyWhfBG
BojGzp+2pgVHJwCEuN2FHoeo1fUIVu8hH00Z6WNl01Y/hF2oIsHNLFewzjqNrMxcrVkjLhT6a7z+0Uov

9MlsDKfzurHGUK0ecCZz8eu4htkimeNzjf6M+cAXtQUFox0OXYyo4IAw53sLE64FyBfLLSH29hVcpUWP

1zwXNb6pODucwMtG2Ewn6JkQqLmMeDayM6AGgkk/uR
ICj2DJaqVgJToecejF8ShiD0oaJKjTIpj6X9ltEm8TfScqG21p8cW/FZVCjYx91KbPE5MFzLiezLcVEG

JbJAgFEIPV+8LM79Oj7SCrG5xnLcqscF6YsseXIIV8dtYyan4M3Ujh/O5lzPvgirSdv1coUt+mmI0ltg

4tnb2aAKfawzX7gucCvzl26zSut/pw284ro4RmR8E3
2vF0OUSH4m8DIu0yLfJDfHeNKH3tcMj792qawt0FYXW3mHnYm90l2oPnTloAjuYnr9ZOA3e2V/7gU4rt

bDq7VRoEi3FpI5V5q+VWCV/2yztryV265sgJZQrmhIcIvGd55Xtu0oxIrSMzVTCneUzpnTj0sbhKKbl6

Pc1vuBRtsxIXIx5A7bnqnXTI1r+8yCPTOqAqLEfXET
0pi8Nh7uAqih62VYLzIhsOH2iwGQ+epeY0vHImF9x7vFqubWIug0+VCxDtAT3ttwdNq99MqeKiPDpms+

jtT77amlXptexVpwbImeysdCFNFWkQEPbZKIlv0J1rQ/Ue/uIEq8IwO+rvC4y4gNLOLq3M58MpvdRw1A

Tdpj14WE679al0YZk1+pZIOSfP5G78QQhXnwL6dyal
w3CnJUaqa1S/oNSqChfdG1geLwH8IGRBaGPaH6zSWnP1XKS9tO+wmDcSgdWKXMcaSatLopY2dmnXIIva

SwN0SEDYmMqeyvUQD8ePzrbvHsi9uZG+PysVEqQ9sWtdbgmpNM1W1AtrSnVhYmjnEa73zOCx4EWCrEbd

cdBK2tBePtpjSYrxJ1B6lO3luArqGax7HbP60594BJ
acyZOmR5/Fyo/i3Zpi0yDln+u7JMJNdyhC2PNJ7YvnI+QRJF0f7/AO1N2WhiVMLvwcg9U0RUevYlBUn4

QkGql6W0NgLWFyDH1/Xk6tlw401t/hB4WQ9vVBeyQyY1veV7PqS3exNDz54pDX1AYBtBXZkZHUqYhEen

LAV3K+1skl5sl65bRVA8/op2k+8H8pUl/ugsxPuR5a
RxifZW7f5w8TyBeFBKJSNx9DZ86i9QRDDMQX95VGFyqPFRHn0Uri8wDnxbaJlaTrLmusz04EQNAu7f/C

9+OnW2Kj7gWBGmLxTVLhdMx28RXcCElYsA83eQBpdRkOnPvVxMc20LmyZpQ1WUoqZ/YPeWKTVapzwm9K

+l/sA/5yjjylYxjrfffTMu1qVb27ooqGKfKHGirVLD
H8S8XFQTTQM0d+XIjgp/QpnISmeF43Nw4N5QTIORn31hQELa3nOaHUBFRl5ti2oqIG5rvgB2iXpZM/3F

m1mNEK4zwQk1ec57bHk5YxF0f8toh21GTd57szrdsRiVU1lT/sX6aLkztQELZK+NEnJ8eiOizewcXe40

wgqhVNqurBLQ86Ae7hOWHgI2JmFE9xf7/339VmDsku
rIcTaAXVMkOBKkVlOoRIoQyGg469wpjzFK3pRL7IRwoLi+jaSwrQ723Vuc70Xk3/CuObjompkpi7w0bU

lwlSNlVzP4oHjp3/Opy5BNnkVFYFK1NTldujRN7skz1ESa8IoK/ef0vpkxWTN84JX4EOigWxRtfXHvpv

+RTCijpR5w75h33hbNQucZJUBrG4kOMr6p8/NqxTyA
92um0vEpJ3AnpvCbNDZYdBWiVuavYR91cp7cvg+kp00Tnen2WmHI1riX9uMcZNkUSyR6fT0n8DtUtM2T

HgCHTbuzsbXgdzPWsMagg/hyddGqgsa+lsRaMeJRA8GaIirAhtqub3nGstDsV/PJbwIo2C42e/MMVg6k

PoDAHjNVCcRBCVswsRCIn3059V+VQ0Db7sQ1NFB/qr
GUjw6/NBJnt7cIBrogyaYKZ1hguSOlDI2/Fz2MSqZwQwgQKoPV8iyfny3eCTMTUR2YbyME8b38l72vbD

TIVExxsfvoGUV3PWat/7Mjh8eVsUxF3+BsDe1El4ZYyz4R26mXn8TOGJvPCSHnHDKdXHhUIBc9WcJx5Z

CfgbUx9vqisYfoOnWqdU7/1ToZLeqqO+v0XK1iB1uX
h8I6OOEIMci78Jb5lqpmCiha2x/LK6X8Aeq+NQLcYD6zzwCRr9Z1UsI6Z4PmGGDCrSVGfNmsie+eYkhd

Jl/KV0XTjKN9KBhQ5P9uCEIZf6NsMt8W/mZ5a/NMv4YjnRa196yfpZRrxmv3TdAL8o1DsDw5KdRmCRJY

fnTHe+mdROiZL/nDeRH+0jKQTwG4q+5QFSN3w/j8vY
KQge445Ba66Y99oQqHoRa254ryVv7stHjRmB9xsYFJb2CtEfzrrVo+/Ye4ZFyoATvKurE7mHt/9wwFnF

zVpdj3Ezv8mp26onu4LisfGyCdHpM+IM3MZuSI/xQxI/HMiJ2RmqnPx9oyxJozgpknAFMg/PrO0t6/Z7

kK6oLdoHYUrz/Zm5EGRFHqGy9y5KpmUId8Dooc65t6
tqLa9NAfIkJyKeomVAbZ2TokM8rbQG+NIKQ3OkPXuL76T2Gf3ziaJYeva7sJKTgFfGcYgRWsd7NS2t3n

22aJZidr9QBYK+Q5nyXDEWx9s5h+/NmeR9fSwji0rbB1kPDq2up7uIZLe39kIlSxuyAamNjGL6DSW6bu

4elo8eInPaMYRcvyH8x9sTd0mMcFO5UHl60L3R9kzx
GvgaZweMUK4TAauNcDPs3up70CAGICg7v79/mzGzpdsDH4oblIfuKb0BXbXpe7jUdrhGefhZLLSHrbNz

aYW1UTN4Ft4EEsF5eRFX+KzEa1wkjMMoeOLQMR4EV2BgozAI673Nxgi0GiVztsQoD8uzjb1Vjuq7gLjt

vLVVstistmQ5M2v8WVu2xwneZTJ5vAIumtO24YnVjW
7eN+aoOzQAQ2Rm2Ai6Snv4FYNU85j887lOan6Gi1P/DBzldNVSvhZDD0LXrwUesZiAAHjm/rHB47JTIR

cul4RcekaS+Vw17CqybSSAT9w6WJ/Qit3yfh6oMa9m1DqAGN6HSAeYUJDtI16AbVohQ/S5Vhc3LHBA1X

o9H+YVEK9gV8xJX+el4YUXe3eRQuL3f6LVSuk3vE7l
q5anZJCazkXgHQywjqEwt71UwzFXDmnudJzL0OTl0YAm+TQ0CCAM7kTcLiq+GGRSk0nsWFB40EbVS6Hw

WN8Ad+5SMuEoXil6nT4M4UE0Zg4e7Lqr73NbSfMrhU9XPzesQQeaoPDwBLWUuGmwtBsQgvgEltmUlRvM

WwKWaqJPFWL0jgCoA2qz/j8KTBI4EqLydDKxdbhj2u
MKFNvkb7LXN0/Gj4yNoyyWsJ5TUAYKglxhh8iIBPCreMsGkNQsUHH1Cd7qOx396AA3S0FvmKA7u04HKM

wX1uH6XgcVP8NON6dPAoHm/dkT+oN5KXoU6z/kZ92DGpKHZKOa1GsVOCWZ+1kPW0EmTrNKUIuo9Cy5eo

vsnbEq0YTOkGcfy1Y4BdZf6AvDz7feFJSWO+jXWRK+
NHowjae9hRnWpM1nqfD2Vg1oOEioDyeN/sykbLXNqjt/Sg15OJieymBFQcY4eSRnOYbFsVy1sopxmkHe

lqxzuh0EzVas8fttgqNGmJhBeV9a8NwaYlQR5o/xgZze/QuAb2T4CO9xrvsyYWlBSJbi+mLK/UxtxZWi

CPo2U+zomYn37Nf/bBtI4M9JwooOGLfUHdNTkI4saq
nifnXfnsG7DiODD/J4yGH4nUuO7yTYMr9vav1hsi5/lzZZgY/r895NpkfUzvj7puhk8K+Cl2pGK9NhWD

i8bPl7FSG1E6wBvQTemqY5Nfn6VRwYicIvBuEG4vet9Gtxkj8wzrNonKmZotGadZz02GZ/bTVYj8n3eC

BtiswyleDMJDzhPOYMYgd1tC5AxpFuV6xEppmOpWcF
xr+Jk+bZnWV1QUhJgul+CIiIaimsvXu+fglRCP1y8tdwpalVWACkYaihgl4BJQKRTMZwSA9dScOXKY2t

Uenl7S/VCqwGSENe3lEWCzOeYJ4SV3e4DGpCdmYpfeKpqJNt2n7Xq9NU6olQ8MlxxwVyuSkNgZkKa4oc

kBw30quuGa0SAcYqexDkH4sjVPQSVHV4S9tbcX384/
Erv55UerhLhrdyxI+8jQ+S/FK98nkL0yGVzi2NqV8K6SXUFNg97cLFtn2taSB0V7HfUsflt86gr5Sy5i

+6ras+Hm8SGauw3WvZpABMAE+mGsGVddEqDBUPH+2LTN18yWUun55yQewcBUqTqe78Loi4fVLpHwXXs+

bsb+3o52go7+5iazQMfFKJfrM0jtB3HDMVzMamVkb/
1tSZ4+3DPaElYZx52FfRpBLeNieuNaG6F4GI78UbOTxfMstKpuKS+l7eJA3RLFLteprMWzEandAU8foq

ksjFf7D/DO/fqSylsCBlgTRxQpfo5qSn/05JDmClVvB6iJFWa15q8wiYFy0pH3jzv2/X1uTlh/Mj2zaI

suQVvuhX/fFosHmxjyp4rnMuiJyMN6kKfFISLbSO25
UAc5H38kyDU42XkEj9MqiwLxjMb++LeVrL9Da7/0+8hzs4EIIIxs5AE0PFkoIh1Cx38AMyeS6WbWjxRq

SNMy4nlNyr3/gw5vSjVI8ICdJlbWnwz4Zie7yowewX17PBfkJluSOPfBWdsjvoayKWDkbQ4WfGuhlA8t

/bAzlOt1UMGZdsZTWMt6C9xYQD9UYymyhSm9YdmGr5
K+AmWBU2Hgxx4hn9KCrE06i9rOkszjgvLrVt0gjs6eSc9YUxX+omuzm4NMHfu0OcQfiaNsn+1vrghmJl

vzP4BybEwJyhQLufRtv2BcZ0+36A2+buPzwUZVHm8yTQ3aA9y5KC7R2RWT7VePQpt5eY6oOnXtsHUTum

EVSgpaoILdKKkTevi1dJ7kw/GhXsjdxaYS0dtKtFxF
50T5J/LKYV8gkQZth4upP9BxD0DX/F5XCHPYwsFBs9bn43m2dnvdVHLwyARsS+kqzpod88S+7aSsVwlt

quJUyRZzOrlN0F8Jhuu2R3yAWmFedZotdf8BjIuK/KaM9khirIVnS05089MmpCeK1zdUkCzSeqS9WIt7

ZPoPvPp8ro+qJQIdKoS6kS0jw9JZpiEDuo9jHfe7+G
RrWSq5f3yRPAvEMbJGOQI1oAIcrtRsZLuYU8lky1CjyN+1jpROhe8SJHs+6d2rlHweR4KgbHx4OzoPg6

MXbrLK8pTg2oF6wu7VtX/taPe8dK2RcrZCmD/bqDEwbJs/D1q9myKLYmiHbOPaatHPmMBLt1psPupnFW

OKy5MjvMmbfD8A59DUCncuPOvbZoMA9DqHxFh7ABdD
KMUCJtqgRXYlDXII2SzOcDySsmToV23bAXOCkQCz96nCyrt0AqwiOQWWcfVaM4EydLMav7w/uT98Aglz

d7+o1JAUxwfrPBFBxrGzKgH+/DzKNFFiOX3XolboADBa0iRgqG7Xyr6tE8a3t6O9esxDgy+3V+e4ZZMX

MAKptLOkcY1fpPE0vBnZZdm4OQsp17ktCGzdJN+ffL
Y5HPOfxQXxOR20nKo3TWpJKjig42WXUL0f/Z4PICJx9wgXjKBN44gTdOJk1Lsk9F+Pdj01KOHJl5gUn7

CDh3sYj02mLzWoW3qkSth56pMMUT8AKoWWaxdYCsih7/nHb3Ve64K0ShQmH04+KVhzuO3qVDBMjM5rgB

XUZv/SHjET4xgK9B257RL6GYJ8JdC5YG9DXg3H7dSJ
Wt8aP8t0/3E+gMOan8ZaFiZxwoUQ+UeY9oKypvUp7Sr9sLWTaWs8BzpknYmwo6rLO1Ra1TZKxQ1927xp

9s0LzSLKl0wQpi55kScb8O/fpUlRvBx3+pekH3zQyGnVL06fYznkefd2WieT5MxKjjPdIf/YyX+icwiJ

SfBiQrahHR6XRwPWbBHWvM9FNf9N/ht/Bf//xbDHoQ
Bb9TUrvSbgpovsv6RNt698u1igng/Q5NyHLsCsfOyjRIvMgkJhVRGv69MPC7byegdE/rEjvX35VJxe6d

r1B/VkAM+AZJ0HxiFclC6dUyuN/hPEBNF+4nM0PcTVgKZir/4UoQzhG0pEq2gbp05YSXdwV+ooXmlZNs

GPWy3kBKhNPtyDLDv3hsQ/GHoLq4qVvn+O2bS5KNPM
nVk7Erf4kJ4eJItY8njI/R9A02P7vrfO8ZlimeFVW+mbjaP9W+7TEKq5r4h8lxPdVMmnx7LT2qPyhbWc

Pd2VuqbF3FXXlAdmSqw5biO6vCCqLLaKmQnoBYPLtE6urP08LC/x0rgRRGS3VKfuddP+YslDWH0jgrJn

6fqA2SOHhWebLJ7mdPITW5LKfiJWEWVhktjmaG300T
UBfExDMKd6PNDMZyGkY5ISM9kDSy9VyMyTXBctVvAyCLTwMn2kyuKnXfSuNUhJWBzlQQliWp3BmAeFA+

ulVEhKa4yKlTPIcDJ6HKOuFG4e+1NlGt54VJr6aNwoyWk/hgRypAcwuyBteikOkhNF8wkXbxuKYV0NzJ

IbmTfF2lATIttuOFBpF2fp92zLAg7DlyZDyPNci4Zg
wvZ0PCU0ek+SV4i13sg3G0iI/dneu+jpGG119esRk7cQpwKjyFbYmQEJnhVAoHUjxbVZmfJVS45eaVxv

lv516WBWtPkoqdgFmG/uK/GDcJk5R+wTpd0Zl+QVmFp2SsLb8OXyyjx7BYbZxTqdpx1r+TYjQV7zEEeq

hjTZSslPOHmDdGeRS4N/ZQrz3CJKXcgfYzKDDpCUAY
DfAkbFGtO/nkCe8WZ6i/cuA40+iotToZbM+8SHKeyeFkMeVU/EacaFkYd0g0cW3LV534BHGldD85/zBX

lHgSqgfPh78k+5ZSsBYIRA9yod1oEqyfzxSAh4F5j7T0IescAUbIYsr8/PcsiTLwj5JiuU1+WZIbG+lE

xq66D3jVWiMKHcOgtf2gQOcEPXtik1eQ9OAWUFT5oL
yvJmWzOhCQY9Li9EvnO5OT6Py3/j2r4NDy4XdEnD1+dgsAybB3pD6NV2AWuCE7WcHuthpUaxZ0BVWiGL

lGxqTiKwwvdh9Xnfz3t3K7T8sYcG3nPvvADeGSf4v0so8aaBrdv91YtlUWOBIi8h9acKQLgX8/ux4fyb

rmMTv2pyZhPJxyPhAq5xcHEj7h2QFbzWLmFyk5/p+1
M4KkXWUjz8AsU2TSBmYqqBFkKeHkdo1UlWCRIO/94WULe9FAXbs1SnOUcm3T+5oLg8naLnF0eNAQjiaj

zX4rc+m7vIpOhQ0fcIJ6H2YRInOvx0bAOyYhVJ65UVXj3zzzAr+Jz1t0zlXmce3EScXY2Xi0c3QUOzK1

CSONwSWFV5iRDcnWhYI9r/nBsUGISC/m2QaL6r1ry0
OqBE2cVvcusXVgqVwZ3KUAfKgHgVwAAKZtEBETcsOyE7tLzoyQL16GbW19S0XlzJ7TO5DCZYulQiqvKl

lEpiCRiMpUlqpVb8qx48n6Nl82nu3wvtCv/ghzkNaZ7IFtzgZxj5uoO9fpJAuoQQ13j+2UXVRx4Jyk8/

uli5o7ONsReBitp35plWNSHRiYmrrW/+/Gq/o3Yy8y
YbGJKo1NxDC4vKvYiD62uQeFhQLVe/cgBub9mITmIFYpppm/NlpzALf0wrShSTzxbOf133ViVxA/R5bK

bFs+jZdtFFl2NY+azKzBvOTLlesMVSxvFxroZkEcijeLrAKs2at9TXFGfT+MeyS86hlQzYSUb59U69VD

ADxJmkWmcYburBs2wkIZX9I2/7csX3EX/nAVDtd+ex
PmZ+lb4LXS3a4quKol7TxVcuxubAWM6SBVA3tMY3UrpFo8Iq76vpJlDV0cU9VLZyjiXrko1FSKot0xZL

+KScwd9X1TYrUAgrrSei0H7l00BzpGsNHTsA+H0hJBIp9YYbr19XUBKIn9nWPcOE0FjXYShay11KSN0g

qB9/VtW9aC7dM+/JDWRFiVOOTDQRzs1yLnwXygsgnA
5Lw+VMILPQaKjCLGlIbLX2H76iiLURZ4tSX6ix8oJAlUMhocuyfLZoP4lUxS/Xhiag0BRSeHJlDYmeQ6

bTmdDGU6gvL7VOp9rhvenBVyEU6WerPBclWgXU9MAC+2zceg5pCzS/+P4S3y3hkZ3affUX8HJU5wAW+h

u82m+h16HfDdMv0TstTUMnkaJRhjgbu1bVlMIowB4K
kDT9RhT5m3d0Yu3x7a18STXiqyeXbSOTYWBvIdd7PzK9H+Rv31Xvb4QBeRYX3AG9uk4Tl3n11Jk+mic5

T23UTFNMROFS+Yeo6kbN4xw7nVPu69Pi9WvpjNs7tUw6xLuBhz3S5d74TsleBMy4RUvOAb2HNk5oWtrz

LcvheQbkYFEmXb42PMzeT3QtrGRfR/T0kvtbxk/Jtd
/siD5/dv1kyUyAhhHLV0I+qHBWLE/pvkUuohUiKN6t5T33jcgFEU7POi9+Wgmim7OVNm/PJi8lueYNGE

mLypopzEO422hYSWG7XgZ7BlQPFZvwHGDIF5YDUwfuRa59X8aA5qgddeM3m2iLi1HMdtgIYRckgL6ZLa

y6pW1rmBmdyw2IkfWJlBPrgPY++696qnetLodSruX2
n9ZyHxmsA1gbOODcyZ6RqnZJBk4rSn1S/wVq0fio5DxR2CitbysIq8+J7qVCzZxRq64thAOyoJ1AWlca

eMxZc6dipEc8uY/YeTeNS+dGptV20NDzMx1WnWvq3ZNVcFi50RwvbQOJ9Tzke5k22CpfevkyzF7DZBC9

hBow6eMDh9TTD/QSoBW6dadltxiQ6wO2e8gXwSjidy
Q4ljT+wXemoZwi6KFuR9BvSiEbPvOwZ9iBYR9tVrFJwFrLOwIjo2KdU0VRX7VXVaHSRK05ukkDgASItw

OJ+0qgE5QlggL4TRQfWyIWe2QjfQf+aJ1bcCat3vM43xzu4NHOghVCr0g0egA8X0SzbZ2WwCnSTivXti

rRkc1lAWMH99Cy1Ac+vcLK9PhM3JaqKUK/ANhRdp+6
9KK/qc05hWNuEhmFVI3weQailuZldqZirbfIMrnh7chVyCtQt+9ZAmag5/K6WpbGEVtFeNHuyO+AVS+S

0tV+Mg23EUrY3y7giMbcngvsa2D2hVA27k17nU/8yaAHfsjGw7Xxxu6kpBFNimGXswjPJA9nTgXJlMqb

8KNEGldkBo+FOFJE56YlZm2F10HDADIkrSFbUSoezN
GfFkeQkTJz2pOPtIonrEbhryo42qJtBHrguSj+pg2tzkXYiH1kMUDwO3nj3EQaOQTuLGGeEnrfPHlbWG

WChcAE3bzBTQGvi0xHs82Q/O/3lVnRYMw3i6pwWJLXyHxyxOS6DKMmOGm59qyg/vID0wTgje5z1wbpRG

YT+jFjRznZm50hW33av2ExKdsLP80OjGZ8B6vYheZ8
Hv5cMq9BFVs/XOngcC0QFgNa4RYQfFkS8yU0b06wN7pJ9Etpog5d2q20Bo2gFtReYez6TJ0oh4kWxRRV

HV7eSGajq4Gl+pkoE6KAAmzeZl9kvzL/2URZ0kevht9Q7ZCOwSEEc/7j+yx3VyLksqeDcsv7Sx9RzKY6

Of/VHKdfLlFG7IzNDQ8l+dpEZZRfADjmnMHYhoro9m
ALeBQRFnNxHEtX+elnx8rP+d/6zkoL+F1BSHFjax6woxxS2gFyb+/Gr/ZWwg6Eu4A4v9zpoj49W9BCc0

P6UpjPimegljC9/gO4QA15EB0nA+8HWA3BkalMtivWxm24FMSo1nZXtt0yXTCv9ByycCJIFueaWqFYgn

4PwZhuJB5jqFFGmMgXwqm0854vqTyaix3fx3vybiUG
vZYZtuSqW7y5Tz5/vhHtA8OkcINNskQQ7dlGMPV7bBq66oSlnf7O/9wX3wRSo5xy14u1ep/+sirSsue7

pr7uHWc7uvcnjXfQawFj26tJX+Iz2P+nQ7hu/U+KkL1iy1J+w1h4vWA4tHcJV2mrpvgIC/sSKtu2xpYd

563Kh0a2D8mqeT02r+uJntN0wXNV6t7g6KR64J8Pey
ZD1rg/c2f5D08r/FRqBcUkvExhdKWVTvEnBTbyLyarscF+X7qJuzBfy9WgRdgJCbQFXb2RV6K0zpr5bn

eShxQZ3vhcILPq9gS3zeJOmBjyxl60Iq795MdEAzeMxL/h5Wvf3X912vINKinNTCk2q+WDqodNIWWLHL

F6BDUU52/zACG2a3cW1HP72oQnaFxQLaarQjg/WDKX
HcCru2ZrCl8T3n85B/UA6PnE/KY27guKbsBoajYKraDKOqo9wuEpU+ZebLZOTciTgbi4WgtmN/nWg2aV

lJql1zy+iZSt2vEzGukV/hudOhCGta+WlslyO3RnLu7VDYw4h/BSqP76BVQCIM3ckgUmUyJHBkm9ot3x

fvpMkObGLmEY/U76m73F8E/srfH8oLTFmBez5CATro
4XttMC07pryRGudO6d9o8nQBXfInllXoUDmpI6wel4An+Vb9t5ZRcvfbdU9oqXVO8s1eVcGnzuSnb8pl

JOGYuC85a+pz9c4q3P2pND2Mbo9MAEje2r7eqqnBe6P2ynaY5qBxh8pGhpXcBP3gsaQHRG7smClZqMfo

bxjaQpAd6N8MG1+uZWQjfRItF6x1pV+yJGE93gRH9X
PgBWkrf9cIGTJ6s7rPhH1/uceeS+hD/IkkkZJhZxx03cDfnvPMP+3R5hZuDzh5wfPRPmwl2EO1gD2l57

J3wQ7ev66L2665hdP0WjH3JSKYhbdYrjEB6vUtLA6TJBNW59I7/p+CRjaS15Ofs8NJNXIhdvpD7/cD4H

vjIFu5o//6bgcsMWCRxIx8qonix7gvomOj4bjgow3p
zuGzaTIJyX8/nxgIgoaHKcVWzTUuIuP56aJU1XZvRpv9jXmwYb+RV/o9VCtjUwxcln6Lke9fpbX+n5Jp

TYDXjDeYVRDzmKA44OwudTeCyAOxZVZ3lgKFp+bxdSi2oDE9TgfnGxG/1+CEkFosPFhJguqnA5BQmkCo

uaMDNtVhcWbtaTnCfZhlhVRJ3pL+S8ZSzfayfPZRIM
PC0zpPFWI2rIDbq9m3bW+6f8FGC6KdlIstgh6HQryMxiI0El//STOgq/C7frMTezBZpC4D0dEH2ytHNh

RuXyjjcKuHT19Glcv+MVVKH695iieAiRQDH7N3fpoA26hJhsuiHnkO3Rc6wkPHdmlpfUW4XtrKrdM753

ek6gSvZreaPyXb2/uku5uhKdXGHypf/bQi15PCMLyS
xIf980q6Utxo9r14JonGptfyH2LWCm3XHXY+9ypkbCzWYMJ5auVClyL70DZQGqgXWy5ku0729vi/tOu9

23i8t3wlzl+uYO7gUwzZN1L+94icZfuOkf+/bWiLoQJ3IQVaSndctKPOkfd7m+cTLykWopulxeQ3V8vh

+BWBLY7X+Gy3lWSpjza98QBPR/3cU44Oxq0sQR8Fyg
Y8KyYc8w1htw1JznbTSIRwfrUeimNlLAqxdQ7KaC8hy0zKswrnlhub7+Nj28Y27IxrNA6NB7k9lHi0tL

G+pcQ7xqL3cUKSfA7Lv2IptL5k8D+DYEGJu/KRrAuxVfDFQZ2Pvp09dDPn6t7kgT4boQh94rLbu5Dk1r

QNDe/SaofbrYBeidxVbmlgOr//bsB5iD3neHyG21EF
hSTcxk+75JBNcBw9PcMdYULBgQTne3CXjXCMiiEZWlPUrVDCuLLzft/v2YUAp9cjlxgKHNvPIZaLejvs

44GmvXUc50kmO4gnKrC7XlUsmG96JjKSeTyN2X5gg5D5f1fLNm9dTBiL/zQ11mNtFqbS1yBH8pFuHXtM

ZIYKluS2zCRZlbF72CTIPYTcxAchR7Ey8ErVjhn1/h
p2fNEnz+v0GYCmXwlcT4kNDh+LVmx5q+GnCNFWlL+ZfKm4fsoiRW+bfPhcoXSWHjQpK3EP9TweJ2NMxG

EkYHtMAcmgDXzhfkcmRGQpsCohOF6UraMloG5QLY0FWSbKAl3ILhj0IRFR+O29CV32CBmYUjg/gFLiGh

7eCXQy52BSNzCcKXRE21DqQIuQOoP8ivmuKWQzoesi
5XnatSSPYFsb+C+XjLp2938s0rwn3j1Nufzxyc1KVu0A4gGrzXT4t8asKaa/CiEIR07M0qOnhUFDwJy6

g0R7qm/oQr+/XLdzZCWPm7dftOPT8UGik/vnwdb1zoB+WLToJMzFAkMbyFUxIC+r0n2wOPUd/lcjL524

aSHU2Pli8EQlu6/uxQzhk/ABaLE3nbRx3S7yHWXwfF
HTJjB7hQHXAJddxKiSllODCb6Y9tb89qbLV4MEggHChOTARELStS6SQCvJiipirpIqIMQ/r0iFU56oyh

B+jkMeeMtcr+fuymx+9gMGsH1zdwrJoj2pyUSXUES2fZtK1qCBZlSzNkmHwJIe50/3prxv+w5uGAoNWi

rfZ6QUwV2dIVywSdz/o/xfrx/Qlahmicj2YCSz/75M
+dSc3f6t4AvLppncRwEi0/Jyto9mQWg4Oz0Lq8CgWHkF10gx2DKldT2QlsTwSZ9SfXgYDw5U7zFPZglV

1xOPOCGOJqkwSNYpSYEP/IYIvPrB0E4oWQDHwf/BrzdppYY0VZuuAHHpiPxk8FzZZBxl/8+9OGvTkJCd

SjWwFKPxwsT9Z4G8kqFzPxTmI6lOhlPnBKR5xxfeAK
UcTchybTzcFoTZMfY7Im77Zn9UuRxkGpXTUdzkHA2A/ti9GTwSEkXmoH1WioksO0YNM581gs0SQu71//

yRgZV/HhkA1j2AuzitDiQ3Pc5UXJqp1gDcOkv4uYn27uW66IcueRpiUr6iT/vsSlBDaozWaHwCkDbu8K

HWAP5HpaCNlKNCshD+micjSLV58bIaRV71mAODOiAQ
jUAE7vOKws2moSDQ6W6erBBIF49d2sWTPUZ06zgSfA4jGpjaX3nJw1BJ1yGPUFVJwgvoTpzuzs4lsKIl

rn+NkxxIXvrLw04/8WVDM0slAvTQPKm57P+Pzmvj8bMYLSstYmA+x0giIrtLnYzo2AsiACq3LgfD+nUd

SpBVSJ/xpKEyT2qIpS6duz0Tw1V4JCbVzG8bkSo49O
gwx6z/tKXCgVLx+V/BtorGzBcnnYV+9oZioumc5UoXOrsxsB5flm+We2A7GO414Jc7V0tXY2V0/U/ccz

7mlmh2lgBNs0+wwBgVoabWj32bcvQzloJcoxiodgBd+36SQwnBd1FGiFOecQSdh1B2hf+MxF4/mjztLk

0o/vZWRM+Bg2Ra6vgKXq7hivTZLZ9ElyvPmqwgF6OK
nakOSzmTS7+VrBgNanij1kxhsgAGF147xn3LSWhbFiYJ/z3d774FLELV9UoXlk/+a+9Vj2GZVtUQYi86

bv6sINBHpAg87bB+OKBk08wGSpZ2XDsQ7rZ5/jxeT2fa+BknfudR/aadTPJC7vVH2M0heKEMD5/mmpQ5

gyLrt3FMmpKJajrTBiwXiCK+tc4mDUeff1h0z8S1Nb
+d7HvmS0pj7HYLLl1IEM/19VvOl/7H/sf+z/NsK12HiOrYi+8/ayf5byxTL/vC77xsOXD7OzBxGlBJCD

Cq7VjL15ECuB7MdBzSpefK74lC+Ez+f4KlSZVYv2NepucljZnBEY26VdkH94pg8wXNF4dT58c679jhBa

Yko8sXz72HQvbsJzQOKLcMureDiq+199LNmOh9lGse
LMEq5li6s6bs7TDKhe1RbqP504+U10psTbXPNm57iq9wvKNO6jZyeXXNhxc4T7wQEuvqfZnws9MntCJP

9nX9oTZlFENO7oaQUgvsk+1+py5cQlkNGRzX8VP46i5f64SHEMnazHILNEmxUc3Rkk+iRfGDjZL2NZSI

NwKadNrp1dcYMIJVDDwuimkX5DNqzoL8jQRol5Ajsl
EcVmREkg+zKWYtumt6D3h8/VHYoUbki0kPz0oBlk0o+yPb4NBKOYuwbsZLF9SYhluo1G5h5fP51kDPVT

zR08XIJyF6KGABoNsKW7jMgAcyrs75eoWM7yodfCNebJFpyEILaH6zsx0ImXze157IjPQtMNVwyEaf98

QETXoVy2ONa0pp+ynBXSUtD81iqgQJLYawIW+JlYur
KdrHct0bwHaELDa5qD9B6p5agI9X2awZnUYeCwIt/tDbDTP3Bk+TJWdyeyub2bnuMID3iBVmHCZazZpf

CAVbSEjr9+XLpwzq9opijq3OlGF4fmjXDz56AFUSILeS7BzirfhclejpcyfCV8w+qkUsRKENI139cRxf

ae4MenpIgkn/PCcNd9s2CmgPP/b9vZZblbsH+rwm3t
iDPtY8Ne6Y+T/ecVvDmsvxTuD41YMK4DtCszqc6IOUARAuyIEtL99WSKBsgg3tlJd1OcoedttNnHHpWi

C5gcHJvpxY8DU4YuuT0ApslgYWOKevWir+8gZ1Nv/nYH2gFuBAidYYUeyCrR3oiCVP9Aqk0ZyzHH4Nva

AqGxKqD7oIMTFe9nBfziXe7X+v145n7Prn8N7eCAlM
LEx0wp9YSiiAM+oI/yLU095yKKMdGToO0SF8ZIa2BfzrSC1QdyLCdDraEcKHFIoQDzYFV2NuxOHfWW9n

QSLOdp5/vCiwQVt9883HfzxkbGT7ACn01fyVI0x3lifcgTIYxzAW834sG7pXaK5ZOXntux8NfuZs+O3l

umZGUKXcH/26/UFH7k7Xf5TzZsZp/4cp4kSFmiNSWR
EUx9II2GoPWH9uR6E2JU7DyMKuOWqri7RfIzvqSxm0w/OAeG72DFJmg24izqZ3UB4aDC8U6hxr0UiP40

t16+kllTZI32/gDPqE322uTA4DjTLKX6DFhloZS/xlgKxBTFMQ9m9g70TCMsM7SAk5Fs5h1Izm13hfoc

dOSS8tQUtd2wS8MEu13u0RohokFWBEHyzDlfvvXEJg
8zBomm771+/R1KQUSLQZibDsiaO7AwCfhCk72Tvxih/8HjthyIPu1fxtT6YSODbVbKe1EiaKvck8sdXH

jZjtSIiTO8RCAELO55OmaVfN+hXAUK2rDJ2JKvhjcaUihObNjziNcUxFSNwoffRublRz3EuQRV+xXVSD

Dwfw8lojzIfptaVtdVm9rDJYP//liYZIqtiPcN2aZo
bOAVtXNHoios2u86GxFb3TWlZf9P7tx7dwVI3i6/HjCE5tQy19Yeqtxg5AhQNkq/AiN/O2la2Ut+kf2n

+ywT67CpbOHy9IuBb4EaN6AvA4FbdrqUEe44WBF5rumaf8thS1LDDA3dfF+28oyy6JzjMElcXmEtv/gy

ZhBdlbjptQoABEBg4QIn3etarnDvPztvm/TV7Skmlb
d2bbaAf3eKy16QK2PM/WTNdHwPj12zcozq7nO/iv4yh1KnQWFZE/2Hlia623tYhN3uyHWUOyVjnACIGh

52pl4PxlOuZji8TMDL7Lt44WlwD3sFPLMfOGUdl5/efZ+poWJQzQHuc8GX70ngJbyTywmgFD+tCilIo+

+g5baezRzItUNjxu5ceDGAycSSuz9qbo4YuSoLaeNW
QM+ZH/UCtGOj9RlG7znubH5R8ktSJSIV2DerhAdCk0nMZp7v9ShwUTr14vp1HaErPDTcUkhdKOOVn0/b

Ht/7ax01YYrSJg1CdVmDEtqtNrEnLkgSOonTtJpxG35LimSvUf+GrlKhL+X2M2vOETr8b4zD2rS3cd82

DHz5j4TlmYTBVAK/QZxk98ukpMICxmxs00mx+wdV9o
cq/Sc+n70dlX2K7JYznRGKxXoEkp1+/tnxhSBHRYCMRL94OkRQu/Zvzpkzhr/26B1696T6RgK30pkRzh

6m93hP/LDbxgFMBXle+WxQFKT/RjOb8UNn9bXWlkutdp0efzRjqsMymd1pvvAoAied0VebGJfjB2NgHB

8MgjhFAwuRFugC8iKl12qwkXI3HoDqK96/yJ0fOJ76
+SEFJwQiNfx5iI2KB5ejl4y8H5hvz5/iAaO/HOVsGO9EGfcWBlkEDfwW8yxhOdGIyH5InK1TRhJ3aPU1

PjSlNDBVNwxCgvr+C0RSN4wxvBn96zfPWREoMbjX08+Uqk4MwY76X6Stbse1b7Aqb7OZfLpNoIWrlnfP

vXjltTDwhfckNmjpe8WcpC9kucDFUHiC42UsD8AcjS
+y/pECfLuTicpaCaadmaeuMaKrh5O6dsDfeTjylAjhpOyFUqlJ+xJpb/VjupPg1QApBUdDoQ+HnW2TRL

EbNHf+XDaosbBuI3CMoC2XBcKzazagrdSW5YKBqyEnd1FjQiyPAfo8VANxiApK6sfaw7JmZisPDGp/Nh

rTTuwLG7xgpP+PZ9ru1Iu1Xx6ERqIC8A3PSEMLgXLY
zA5NA0bzMIL2XOGshiyQzv8xQ7t3nmET36iZt0EVk7LV8GyyDTtZs6mvodcF5Vv1jt2NdDEgyC6PzmPA

0jnfLKARLLEdq94//uZP/YMZE7RzGB3P+YPYRU3kC8RydHqZ87OOb+ypaCuElupg/kKX60yQ38qrJ/qk

8hNLiumMHDP0Qza4hfXfkuBDa0JQjlFM9l7V3hOkhf
gNOJ5yPaK9kqF65UdXDFEmaK3Jlx7tdsn3cvfi0ohBIOAXQRsZ7uVndGh/kb2EC4rGqpeFnjaXyAMXKt

4bmkFm+r60fp9zJzDNe0vegj+/8cgaRphiCvNjJZDXmb7DlIWXiXZmIWOX+syZtPQwqf1gw1ADeDEFUM

NYWRiE2RWuY5m3b9n88jJSktLiWtWusRW3trjPZLDc
p6bhxob9pr8vLdI3XG0NVflUkqIu5FPofjc0JVp6+9IHTniqHOL0FzO015ReZHJ8EBByrKG+BlEn+Ji3

NQ2coIRhb6hgvgqbecY+I8gU5CzyWGe5tZ9QoKgm+BDECIm7+WXNnhCa8O57KVGgOjZPWPTdrV8Qda5f

ldSKrWLMUQPRqgHxPTiBkPZIxVJkfy8URlxfjorEsk
wd/cZClTX5f7f/fGCh1/P+kMMTdMz6p0M5xkCWh69USEZzfqXXzCpaHmyI6LWMBn3X/itkb7fwneJPfV

EwIfMzxKvllM0EWKVfpT75Vj4d2onyIriBe3D/V8hOWN8m4wvLVLYI7p62fnFGRy3v+mx8/Fnr5jFrNR

eVYcDoUn7FMkWdBanOBXc0MI4Z9sSJpzbjwa+Ge1Mx
djMALQe9V/xJ6fakaXMY5rN/ELh6juXQWfUy0zFRKT8NWOWzBn891Qe0rSj9QZFMp1j/SR+6y32le0GG

yYXSN9Ut0d9eH8iJzRfNeYlPbi4oU4MzZNwqjRvcbJUlg4MPq9GztdZRvSO+q85T8rIMu+6KcZS1TDzr

AN2XX17fvwjhr3H76u1pVDBW4pJ3ay0BU3NOQAfTM6
D84D/MWAxNTsR0D6kqlrPXcgjKD5i7DvdUE9wbXtglUbBf1ZjbjpXYAX5tvvWYzbIyqLj+SoSF5qFlqx

GJ6d7NNbCPLVxhbW9wO5JT8ucm36YehnKAaTeVQbugjTcxbycYvPpisevM3Y9fqeSAvihTCnKLDb4zkV

kZOVyf3OkCBGJwJU+MRfv1JOYkCphAil/3IpifvQJt
y5mswB4nJMVicrDPUGQ2FcyaxlQYowVYJEQ9efh1+iE29GUUce6dy+udu758/tCWjH7kU3fG/EvG8hrK

Xxwjjlx/VTpmTptkXoPrylyOO7kmpezPQupK9aEK0zHabNjbcflDRLspQh8OrZGWWWK8yzCFeWXfeQns

sdlWRfjLzCYV0PoUlqNzm9LEgEYw7kBLieHSmX2HQb
NkXg0aPBCs1BDfHIpQQEiGRTJNM55JRCqCffjC1KflbdPhzOr1axcYQYyvrtM9Hz7sFxoZK1rBplosrO

W5kvcHh9MMmmyxuw5Bb1vA/3yTOgh/bmJXFjNwfNdqoHAHe58BuPD9rDzivn732kDj49q7asp63QupRb

CsnUtIn4UYZCiz9PykJeaGX3yPBizCYlPPQepKmIet
Y/j7j7YShIdJUq8Koyh7BvM9qawy8marrlZ+oTMn+6jMMHxDuFPQqjYWH7m2oeJ71Vgonq2w6A8puAM4

uOaY7/tbY3qQfGEwqMpVq4Bez3y6OBimQLAH85vAP0L4Mc+XPs7ZQBn2qcSBEuIExKP7qkumweFqL4Xr

IoHy3inVSqkUWGYhsBjc3hgBmBVAv+YxrWM/k2DKqD
ZyJz4xUQQK+HpukFI4e9uU/GZ3C8oi/cX3H99o2k41CjiDMQ/KePkZR3X8brCalgwhgDmVwybYYLyJwm

jrZUZ5lmg5CT1kapEFrR+2ZRJJgWCoKx3Cfmcpoks9egHonjTr9ADk30y3OR2wpJNo45vulGsY2QM39j

VpNC1SnjCCXXnxI1vhUymB1vK5shNs6aH4wvbjoPx8
IskqE3PIx7RCoQjn9pTop8868vvR9wO00UZBBIr834/sWKRVF6YOktdA3rrMQAw2yWs2s9hKO/ZORzku

gZNy0Web/PLn+nzRhNH3Wue8ba0ENF12VhFBc7uk8RFDs3vXYZOHtUNA7aJFARccScQjvGeSz769wyLC

8sMI5quKcXYT+HjMIhWSgD0zPykQM6dCJ8Ra13fIHw
6w5uB4jNxFXmg3C3Y6AYUyaLweWHmTt6getnTdmpmxnCgeWb8A54jog+Hl3SazHvYuE8qC8uXfAptZZD

qivG3cpQS/z/PwbbujNjbuq3Pmg7y0DdixXMVIO0PFeg8d6crbK/YDSny671A16kTKKmkYcrUd7x6IBg

C+tkTBxdF2q0cCK/Qnu3Y4fTpzhYeTHZ7DQtVCDJcc
XUBuWTJ7aL/ryknKe6ax0tQJ+RFbKe4LG6CVHeCOi8MR7yRo9vd8rkQiFpRDZJl6aKL7N7qwo0uPLGLu

8sEfqFDPh3k+6Z1ORdGkCOdFynqrB7jhoR+OoikpvfDU0z7qkw/0+JSNgQaBlpqGASRREAwbePRlpnEz

VcAKsExpKlrxyjGT3tnh4zWQA1X49bmhlz/RNMuHRF
6sYNmrmxN+SBNcTU6BrVAAsmTzKdM1NndgHKPAB9WO+gGvJ7Ozj+o81ssN970HbsEw46o+1uZWA6FrAs

SR35K+bXHDkmNHcqewwbKEyEGJ50oVAjamhNR/YhLSdEa+Ykxa0RpgRiPXVruOM6rDpSEbFFFG4wWhXu

AI53pUkwKK8OvVZQiFk0ceBgsQHvFymLb4Oma2H2wJ
Nhh6ZjRJo6wq8Fi5xGUK97LOdPBO2t3FrBsWfbTEaOX/aBKRn10JW8uEdmebw20PAClw7ejLfzeBX3Tk

JxVu9mtJR6IF9DrHmCGhVm+xCdRrl3tnpYw6e4momn3tXW7vFk6QC9zDCt8meIV+4x+/Oh97K/TtC2UJ

Vaize1sHgUOSTcgryzfvSPHGIlpu7t7E+mR63ZrHri
aFJWm96+18JuGIMEmrzzlmIBv0Do19gZN87M77EZ549sgmN3Vw/xd5g1uULCr38wT//4GGWzDZmQhq4O

gpy5K4ywtAmpK5r8wTV9twtnQEeKV/U7oZBGYrSdZyUOrL8ghMwK/Qu8FYZKKvD5q6f5FWVSvEUu0vQ8

SXi21a5NuN+ZwF9tggG3KzKXSyD44h/zuOsxzp9q3n
BqWHuZ7ZyIHc5LpdiRvFbghhOl/RUANF7Qk+i/4nDx/vqNb3XWGOghrfLKAeZwW0+Cawdy/47tLXXvRi

m8W86WD9z21IBcgNd41a3/Y6mTCfBTdbGAbmorCTr/438Ut5GBCYF7S389B13bo5VY1WJimYGSc1CJgD

Fx8/BkWd4myXmNi0C+vU1EXzOdYcdXvLlEyVsdmFl7
nteG98bWpSHIrtlKWo1r43LWbbIHTzLVH687Qu/KzVRIpxtHbpNBt49c1T88zP9sLiPr3NgTcd0Ztrza

HyAGM7x4ScbNwtBPH4OytW0+Nr4SNd+QrWrF79MBJbI2QuQuSDEmP39L1DwlAM9WnPKq4pUaaqiCpuew

oh/ISNwy1OIN6wHxWzvhrwahnte7lieBw03fkO3j2u
V2Njilq0yGDbW4FlohScLAyQc3nyXVRLDPaNB2H2nUospb/D8sH91mZR5958e6djKNjJObmUOJiQ3/wJ

Cp1lWBg6ponSmQkIa2a1CVUWRStUKpWB+p+qBeW1FWvjuZ60e0BtOk5Oy7cDUgg4mf54LAkv22W2pmMk

Q/zLjKqFkk4on5+V8/e9GCSZKwRLd01pPAlvNQsM76
4zkNoJQToj8EbhSZ2SMoW0YXz8FNUrPd+v+GeBCjIyBLzzDeSd6XqVqrbqhHgbS4Ml7laIALoZsmZuXV

d9Q5aWiYAxk9H92YA1tQXK56gUCzAfD5jah8CBfxi7oKn0aY2c/4cWXYL2qkYFRctVz++RSBLvs0kjwJ

9jydS5hL4PgQHo90bNEI/ln7Z5x4zhPW4nkpWslegB
C/5qzt1v1eQDI2su83Hx39o3BxVWeOPMITvgJSkphc1TD3VhttEWSpcxxl7G6jZt1mA/wIm9oU/nfBX+

D/rvXsjwfclLkHORnAQpHFN7NVGulHSbjfRlfMBZuQDRopjVCD4AzBazmpxwCqRohMFJipXdnsbt8mLn

c+un5226d12lv3Abxej1906YZir3hJ4s5ajxZFZgLC
uO+i9L8z0sawJnEBA/xYYzOnoJq8tz1xMwzPI31qUoWPZEw4ZXXD2DC8htdof83bATLO0CwrNOk5LRrp

4AfveHHXRnbuHIym0l6vnBq1w9385EsjEJU2TtOf5w6pYJx9UcGIGEHpUCdIayIpUcO37fuNkde1TCLE

tH9iY6NLfoufItNd/82JNM51I39rFZl6jbh6rXo+U4
NQhra37wO4+yfu2k24ZzuQCSvPIVyQQeRMIyv4cIwiExxWu2BmvBggT9G12eVAgyRExnbCnAdRM+pxS1

5EO8a/kMK3y6iExghTK6ObhbUGuIsSZtw0afAzJ1zNg0rMjpCof3PavqYibjsFUU/1nh9WQPZAWk3YG/

T6ylfpEO5SZhrQ4fBaZOEVTAuU115GFARwn9rS4EJc
+p7B7XBeDT72shXxDECCTvDojd10MXyq0/3WcOj4ictJuyflPUcGl+Bk1M7q9aE4rNT6x6n/jvqFlOTx

jg4advK7zfV8xVHN9rpLcOHv3xx+Qsl1MMfl3iPSK4tnSoeyw1uzGFDjd8JJoNWSeb/kE6Yp5sxadTgB

tGJPpbmZ8JgzS7ajKqtX8TA8SuuEtmtgaDY4bufRuF
HxQv4UsgXUCakosBHhgPW44AYApS5FO0yrVUVGxvaV75xndPPCJ7imeSABUdbW8RsV4/jDcPoFyYcgEK

O+LsvbAcWd4vZYZrssvqlSmZDDYw6pguQbwu0OiSsciVXV1E4O0CzasNClR0iq01JXOscAM3RfAFCpGT

jBnrBiwo49DlcUuEOfRe/14LHeiAH03CntxbTmrnDI
DHD51ukLbVcYalHku7Allf+nWlL0njcliEmghd/2MYSD7b8hlpNB/jjEsgSlLlxGX1sAKIjXadhaG8cY

5xeLb49JNDSNxSl18R82dr9CrLOCc2qAzDIOzDGjzbnPs/f1Yszn0Hv+llm8Fwil4rwsT6WITlSZcT9j

DefIneswmSrPuIHU+h82B6Mt8qfTxVnBo65Hnia/ho
nPeYndxDINIaj0Pom4V9qFNwGRoF+6MgPkzFTMPNnct0/4cKcMJO6gasNVNsYQ5RYmTo6pIlmK9HNaT7

1U3lmpAPe4mP6TVngob4TSstu01dnT/NyydhKNUbYEcNLduzs6wtdiOu0vKEd4w6tZzYxJ51lAQeAO2h

eCQUVnNqx/Pdf/wl08Y0tPYphZyqXALOdaKUsiGNsH
b9YfgJ7b6fREL40KMhjpBdLx8CULFbReqgW1xQzct6Dkx1ZNVEAYMnUsFYXmZUoJUAL9bV5/THMQPv1l

MTgjy9jfonTIQQIcEkxP6KXkwq752rubteBEHmJ9Q4XVsWhPP6Ax6ZfHP1mxA72OwvMLSirS7W+Zfq2A

o1arCNEGuRJEwT5Qtw2tOSzKmw5b53qSTEzlg3CBXc
A0nO6LNh6hOeNFaKXPV3JlU8wIxXGp8aiWA/fSbxLsyHKx9ofUBTxcsIiVzreC9KTIrEy5G+PnF8dJ7s

EGeQ98CkP+rmzlTgJtVxmrb+TlEz6SO4Pb7HLD3msRC6aJDDC6VbvllFbPqbafAJ8zU3Zqn9LcdD/GyJ

irbQ61byS2lvWcmPwVXNiaJrP7zh0bcs/YEi55G8YF
tKQBrYUDyiM0re+UbIL5iCKqhqH5RAgnVGtEhwsjeiEtJLU1yLtS7diTOc2rQJZ71kSOqwr0enp+r9W2

PAWZt2TLeBLNZhrWHfK2kHDQZ6WhvW/+mnf6bPGjGpy76vw1t0uyxqAgSl799FUxuakUib/qmMejHpq0

Q4fgikLNkh0uHQtWaf+5ei15FDE/Qa7I/svJayUBJu
Phl3kOAk1SKLMI9i0jii9572uEtNGh5fS5dgMaQolgp9wF2Xkj4cmJDKmhzpyOT5sjCd/7rC5Zr7z9fq

Rj7KT/jDKoNA4VA/vm/SyqPnNSCuVHxzsIKcxtuM88pAesv8m9FiUae0fzdlSQy8fAPnKmuETzhDj2c2

pIb+thTAvzdDYBSBJKhUpM4eRl75ULkN4bSaBCC3LJ
TUEugd/etdUKIyfSh1+TEXrvMFZugwfnhDGUjH2KRksdvWeroVYQcLR6u+m4/C53D3YTz/yBv01dANMv

gfJsCGBbeNrTGpmAavgxukjSRgifCS4l6ye2E2d+o6zZTNpymgfgC6IiR99QrslI6HnSd/Y+kH98xt/9

G+ixFGUJyLlrtD3IlFrzNCX9nG37yLexcmqt+f5ScC
Cixjca5Wd/wZDoNRgyImJXYyMXwx26q2V2gg4bk0fIb6h8KFCXL8AqkjBCRgRXSTlCx+L6uT+EdrsPBp

g4qgOzyM3wyedJnIbX9EIcB6W+9ba6vuc0NEdiDX8D3SzJabCFvQ3Un35hb4gv3bAPE/ShKsekC/vPY7

dTDyhXiPe7T4QlPawClO4oJ4hco2+guHy19bjPIDbb
DoeD1b2Qfz0nND+tbT/x5/j7/H3+Pv8W/D+ZlkPxYIT5cgZBeekQChoa5vtrp6KLgLCpdw3cTeGvMBnN

WI0Hli6ULbwfskwPrUKSlF9RA5NW8QT04YtdhGdSE8E5l1udNP+IsyKh6W0aBMNhZUu0ycItIwFVExNz

F6Wrg5P82f/mBe/VM95CoRIeSM9JAg14rLp9UhC9Go
8f7Mo5rf2duEwI5T1W5chii0mLVD1UazB/TreQAtfjTF9tvdJ0Pz/HrIvJrksjtdyl3Sel4xqNWincKF

npDtBdTwwCe4YCPQ0jw1+qocUaBikPGoWOBmNQjnTsLqjULs9T4Ec/jj+d7gIqWdkv4wXaI0L/fPUPVZ

600QlP3qDDIKBPO9Ai+Mi1FbDpwTnA1+pJNSlrjQ/g
ZR5yCMvgmdW5/ed99JdiX1MbyWKylTw3Zk33NbgX4TTfNBe2e5AW8w7NBw5o+nxWhLd1VxTIrjXkads1

IqhB/+kmC2ZFj0ZJGRGy6ggLf9c1aTaUSm400c/wlVIN/zIPMmRi2zhuFEPg6MpEI4dTuJP9aYhyoAuU

WSCj9fSSGW990nIm7ZZAwV4Ab8d6+Q1Pupe414kU1A
6uvPQ3O+bjcAUt/PjWOmwduKeC02fuOH7Bqy6bHm4rb4Z6oFiHF+KMkdzSXtnZiCCn3KhnuFKbVBnzze

2MofOXXa1nJW4E4/T3ice0VJhOXnFA38T0S3kVPFinDONnkrRCRvJpOWiUP3IvjPH8Niv0667Va4MGZN

mmCFYC1yU8g+XfAURpGv9+CP+gO0a+HEz71fLh1IpN
eWhJHAydDzNCkV1ATaXUz+QNqv8WGyAi1hdWF3az2q08KP0oi9+ism/h94IvJO7rx6QvarNMxwCucOei

pxSLTcbB6FB/MA5iMyv3jT2NL+msHA6vUVbpvKbAqJfuFEdAd8fp9oTsPDiEoc80UH+9cTUc6Bc9xgwW

rNorc1OmbKT4yroRwS6xRXn2TaoJfDVbwlMB/b5Wvd
lWH/vqLoq+KXPy07vypeO6smwI4E4CST5CVeLUCK7GcdF6FC6L99sUc64PSjXyBgIYyBFLormYx6OvVL

uxkwHH2xTL08/uwLh2HYRTsqIckqJRuquO3AhFxWgxemuSHeLCJTjIOChfbVZXb/JCq9CcM1oMvu+JVy

mqlvAT7yXKUEyDOPmUgF2PLFV1fYyATS3XScKimzJ8
ko/HCyBCJZszWYdtNAv1YGOl549CLlCbH7FKlxwTUWO1quaAIjMztM7pnzcIkjlmQvy+Aj0fK29MDPOc

pgXg04icSjMgB7q0c4xiFAkocfbalH9oaZcZpQyPXF7PCkCQrizLbyxRxGof/gZp4h650wfSwHrmDHGk

HKwlb/zDjj9N2ysQlNk05L9/jUN3+CQ1vxQq/VtNNZ
sXNJvbjGiiOdmmEx7S3l1Yarl7c9dRnaciKf1yLMbEV1DR0/LGnOCXkpOvv9nugmBPIasdqbnxUAgmD0

pIPFprfiL1jNxVu8y8gE8rg8/e3fKgNBCMOVFG+6r+BQeqX6lQLmE0qEGoeuEE/UVF0HB/S+r+LE3vF0

cRob1y+ddULfkVUT5+jYA/6hBpSL11euT/yFsEBZg1
AQakFu6nmOI+x/ZvbA9ckUPkUR4Wb1KDVx5E+fbo8nbfShh+4ZUUjw8pCs7JXsI9Si3jMdoACFyfOKwA

lzX3HfMVNO20jbW7wuKaNoRHqxNag0+/BbylF2xzck62LlUtOKAs2Zk93oIK2bGa30R3NHCJgUAF3/wG

Oi4SanVN0BIbCmTQ7mDnF+Km1m9e19Bm5R4ShjEgt3
RUGba7Rq9waGM7SZZEkzlL7eoA6RMdY85FeJ8dtJYWNyiW1VQh2fWjVd0Qsrc9XQvSGtFKC+tzdmWqgt

pbddC3RtnJj4SMJvL9ANrHJpP12QBThDrqBaZhP5YAzVTo0yZLop9ZAgD11jwSH+XK+VjewU0ayHKORO

Bx/k28I5x3ZRPV16KJj4H9lzt90zbY1p51Tf7KWghP
JfmImAaFHJPfo96PPomkEurzYvyCK4xVgqT7Ta23w8Fwbnnuzh9sYIXPvwQV/rfFgQ+iPi1lp9QgMGxf

RejPihkMJS/An3aKKkJp62VbhxT8wyul7RZm+KwUPEtUUZmCRoqjQiaGw+0NT3lQcNcmaIIdZVJUyfCU

I60YYwo8pUurkKkHOxJ65IuT9HdFoYhNmln84sP+yT
UvvU+x0VSI7J+qy6m7TC4fW2ejvQiUV2Ss4u0/CIjieMJ1EQk+nlm87s1fuSwltB6URyAMkYY5GC8Zv0

ktnZiojt2aGC7wEH7tE0HMxFsfxh6C+6baauWmrfnJ93idndDHsPuMppQBCpFh8csVeBqrupQMAt0I8s

7cFZnCfms/83bpvz/5m9Spu9HD7pcjE2ppzu6Cezf+
ppNx/RchstLxtewDJMqgC8ag6Y5QuIiplDBF6DwjTEAl8kFgbDimBhYDeF+ztPLkxnu9GR6qBOWXiQRt

xqmPsvdaWuZmck994tpQT+RVLalEq2K0ahY6pPKMYtWBdprTEuemDj5OiNOw7RryT83oTbobveDOMXb8

lq/0b9G/Vv1L9R/0b9G/Vv1L9R/2m1u7CbdNK55GyG
jGbM+PF/IUXwhoOq3FX/+1eHFM4KghERa4SWIH5Y5zmIrHDhEBN8WIwDzMJNyXKBKQxlocv3LYw/HQ7w

kNZs40pUwnNQorse9yv0ypJLh3XA6hN+9Te1qh/W9oV56RldnSlkT6ciSC+jGq1Mpb1DhFkn/r1/Jkkd

e0kUZpnaKw2zFKntaY1wccA8p/rC+jeMQDCm+U8Yw+
j7+Buoro45f3jHm3wxbmPVCIGKntPYIcG7bpa2nIQfUsvlLj+cozjt1OKH9eHGl34bQ+74Mf2+owhIfh

7OJdTTGSbQbP0+bD6+x2YLXqP5oA3yfg+ciZvFJSo3wtsGrxmqDLDpJupLs9QfUyy/aO/TBU0GSrb7cI

PuOp8nxI/qmVkC8hTuHLzrdfvnrulftY8Dp3h4gdgz
MdkOdRa0w3mTQg3bbCrcbAYZxLmzHLdmqxXhaQbyJZJFZmF3WwS4NSsu0SahiecpsjKP31LH1NlvNBX+

z9ffqaO8+qRze1e771kWqnikMq/CNsDN7YPkhUkyb2pEoY9i+EFz8/Ih1SZPI7yksoG4qMk0m5AArULR

tBLYcv454rnC1dq/KxVe1GABoifCUNGbMT/x8fAR/n
VFxNCpFM7fa/WA/QFV0MmlCx1T/uX4n3idCr6Fe3wJChVRTWJmvsw9ZDUs5smT1CqYi8ZPA1z0RzHKm4

CKAR8IXP0ns6ycCgjpguwN4S1+HKSNh5kQTAyoDWis4+g0Gha3NubQ0iOglv+FOPnwvNx/3kk/VNzpA8

7FBw87WvCfk/4ujTICVs3Qz7jvVYpmIV9ITmvcP6XR
+n1RMoptj4TpVkUfZR0RmIEj7SoOveAoX3h9zGR7fzz3fv0+Sh+4O6E00tNcVfXPO4+Ew+UfNdViLDJy

gzKXNWcEoSHDVc0yrsfjMXxllN1/brzbiSIeOP1iTG2qU746sXGAhNgEGDlHni6nGiV0+n3V8rfS2va0

sYYoe/1u/KSyHiLDl7jVFI0Ok/5CFoHzyEG7fWWlf/
f8z9Iu2kkwhtLA4vyKqVibuWRFuCt/b2HUA3qbC4jBN0pNzyTGlJZkwVYNAzW4GdQjiFIWsKwFty/nE4

eUnXUIgIerDjzuJFS/xN/bQYkenH/QeRqQyUa0FBr1C9feIYOa5CPKK4sDtfhxCjnyyOgfnydehCnpaY

zzDBn5eK6NcjTc4ceeb1meJx27jcxePwRM9vtqNb7Z
pNGzr6sG+ZiL+A0M7MaUWuBR34G4gXc7mS11c9RUx2zIzOoYHeJwfpOL1cpyq1djClXg+rAeY5LJgkiF

rL6RdhST6AxwkzQ1Y5SPOpgqzlGYDQpVUZSiIOjz8fCPdkr2YD0VShRz1ignLypsuKXs1orvaZzUHAZ2

i3/OqR/HPY2vsOi465M+j16/1szAHQ83IvZtBlMpJu
HrK+vqHI6S4dfvMwiOWZl0gvIjbe+iQIGmHbK2wl5kUmzHxFuqmPXgPZt8JhbhNoPI2JmfYQCMzOfHGq

pLPNS9fo5KD1mfxqb1nCg+w4pUeqt4kYdXuL12S8wnFjIxx2iIy8DdfL6//H/ZrXvOMlUUstTXfG3h1I

6JcYFLMoT5lwcL/OUGEDL+llF79DphI1RtF1cMJclr
im8sE+RcQWJz2Csd3ZbTP1c1aWhydhnJ+QUGHTNA2RvI5Ku5/3c1weY4SzlEf/X9x+Wqc7G48kmoJhUl

DXzzX3VeDVFqLuDwDpvME94Ld8L48goL3lYS/ohHopGSjZ08ypMJjivd6CuLzbxt61Lusw4tH3F31swN

vRR16mI1FdH3++qtPB2wPOkb05drAHw/xKqsPzCR5v
mfIjyz+e55iLuEKxn6oFHBSOeLelK97bonA983tpF3x3jd+9RciE8Nn478XCCOPvt7M3JUtIBisrJcfO

AAu37X1Jxca5Py6IJW7tgmR70xdlA8ogpFYCEOPvckU29AN/6/9neN1+MwtfPLckN29mT8NtdJDTnfwm

oB1tBW+Ojaz8t8nKW4AInwfoUwp56ZA/jWFVzYeT34
aSPFuTjiRO+GibRx/cJfGfuCYRjK019rjea7DMgfENUao81OFEZD6xuWLqyoaaOfRaDlgP1TYo/0ZwFv

Rene+u5+iqdbckpg80z/yMa1e/QMyed0clYtC4tiP75TJu8rXfSRjaqh8o5CGIeM6r/5LaYpcuC2YV28

j1OhgJGc0aXX11vInEAiZpK5JPmFGibKEQQGoTYyb3
Kg7Y7Z7tMmFBPwXfqMssA+rl6xt+OCg8yAIxBtjAFqa4gr+gcpp0woMjhkMXKA48n66f0VbFB1rRiZ9b

cCgNPXy3+Rd/Fl2lyAqClzwJ22cORF+CSNHyJUY47ytnQqURNDJufNZ1RE7ZXJB5q+xgE1aL9E0yF877

sn9UbfPuW8jqDbhM26EwKOzWe8xjVo0Hfv06frwMpt
2/khblO8ZCgs8qjsQaRS9zcHAYKUY49mUiXdKA7poCeUOep88S8tv3APhUB/wWylcRYm9OYVO5BGggx0

WMTnnZcaJJm1xHRLQXadDQGMSVIt12mPN/2qHRiin3aP82Lxc6mMkBq4CiXNg/bNZYqsHkNDhOyHxQEB

M3G4B8zR3X0bS94Odmi5fqbdeN4/5Ynwhsg5FkpbyC
BcMXu5cAGeYyXykQ924pwrlzsHCldTM8rzs/3T/HRaXZ0IFzAwlvUN4VZqB7UA9EF1C+aJ0ymxWtzNxz

C+8chqhu7Ld6dnJkp++TjV0+MbwD/1f/v8WUOXRCBvcG8HPg2oYWBL/vB36+QQGRmocT0J1LB77+pL4T

S0XiV5GqJaf9tHYJzVMvDeAxq3Q+UH9n0Od4zItVp/
RWB00WTvJrPrjNAd5Y495bj5P1FyLNbcb10SeF+AlFwRkZyA5pHnehlXUAqBYQ3UXlMtJKIA6zG7PXou

LpgWTjXw0ZhfLN5tDKIg96godJBeE3E6sSjJa7gnWOJay6RQWn7EOK2av7sfZYjWQifdrx6bicM/+owv

zRAQXPudWmsTpIF0Snc4haOSFvjMC7a1K76NzwTwwZ
jlex7zO7n07WTKXt08nSQdFKxLMBjLSZUxvvU/MRMqtE3DZ9NkzW5qcCwTK7VrtloMDAq3VMoybRuly6

JlZAn7ndheq5p8KlbkvKejtMZHcSpdKgJLk9o+7ZviJa+7PTSunLY4o6ofIlSIOXFdWuVUFhPG9ukrKg

uWscGAJgaZ3CarEwG8w3+PljhlSP6qEWwh0Bki1Vlu
saLi4Is/KN7CA47g74XPytHj3bK9qbGfZKldAL7s3c9jgXLzZcPOKyhlANEfeIAPkwDlVUmgloXxd7jt

bXAqarUIIpfqG4ETGEBA2hJfhEe4z0qQWQofars9POVsOeabSCZFGwQsdWb0WQXm3RyrDaC2L5yJ8wrp

9nb+gwfh/o1V2GOFi1ft50XGj+Hj1Cu/fQC2TYl7f+
ftE/izNFH/hg6jLz65IY2LAlD+VFET98k/k9acbd8mbqZyMfo2Ucyf/hcEuuS2SBLjYC+apZsAchLKZN

qvx33x3KmJdKtnF5Df407P8tae8zfqaMgjEpW6ovcBwlp4pdCTj06qoIdgfT7TPqIfP6XLnFY9v5tS3P

eHwV/PTkc82Sgykeeady447SofmiZo/tcsX+AowOL0
VrdZKlG9mgFWzko3n3FDHmYzH6qMUnQfDVYlcx+jVslQd6LmLIphYrwMyeNt4bqvkWI02+bcjIfie9Ld

FNdDD0B/z/B/1GDViHRiIThihvlFo96dMuDCXe85K70j4vF63MciKGmZIVVukkOrmH0xgbAhhAXjdIhP

nEk8mTc2J/+720OWZ3PLBoro5fquw2wQhUHCxhM0nC
BII5c5wtkdxuhE0QnCklmJ9BKhQlIlkEEeEzv4rQ/up+VJWGx0aCkqrgS39jJMuux9HesWw96XwfeZky

i3L8rPCtv28rIth+LGUy7cCdWWjdAagEID+aMn7HNWdV4QdID6X2DvFbG6BHSoVBT+kqs212qc2ummZv

LL4H1nu37jks+i056NS1nRDvhk8wOBxvMBGU/BdyNt
cp5J7yOk1t3jIvmXluU2vVrfPeq9J4qQi8yoMLA3U/MMeTHG0an1RNrMe4kgTYgtDp/ffK+3wUpocq/I

ZqIHCsuQvvNxzUEs1ef2fx1++ANeOS4fy2L3m3Vrq3yzKc/4kneWB+GTuMSno284xxr49sSLi3j0jvg3

Mes8CaUpNacTSBBwqwrMcz0iKZGU+NOjbpnMGNuqSN
b5u0axr3qU02DdHNWNoN5j+C5CwLjJg9mudKiXTwiv2hYOJnQeWbMwQSNPWiT/iOvtOqxZvltvGc2QCN

zq6bvWz3uAZraREU50gR/ubj/KPfor1bOvCacqD+z32VifEQX+bekcGvqaiWEp1qNdOPYx24j7ufvL+u

HZ1omuvnCJQg9PXyn18mdz9mFfQVGdn+MWq9LY4dX+
A8knFE1NvKCYqdmBpK/LHYCvKQZlEMCBeOiFyhTAqonb/3k2by6VybOzpYkZ1szqpraSTu36xkhTo6RA

5LcChyK1y5QhHhP5puOWWjoFhFn6cwgj6+N1WWf8xfPwt2Ij/jDpOLUIgTQzzCGRPAKspE5xwuS8pfj6

jVI68Y94YJJRZhKnuM6FhceDtfs/cqLO5bvCt/0AJd
EKoaoDSxptC0EFF83g+fMjezogob7dqJ751juqySIlu5zxQ0T53ZQdDfNNMqvM0I+T0MHz4pmxv2TvVk

n7vXqtuKi8ZZv0u3Xw+sQna3sdAoWOfoWidI+b8zO59zWb9PqZJ5Y3O5SFe+pJFAwsOnjq0sfVwMQF2/

9p7QnkIEDBpmUb9LW3zC+om1sjlbezeH0y4pwy63Oq
da56rMok/u1fz8p0LoLVtoSwEc8KDfFyfNnzBUgk1FEBQ+GXrSyK+dxGt/6tN8cP+0yS0q0ojrbI1iFJ

ecvC3EkV7wBMXMrhH23/fWPLAEQgGPaya6eRPxfG3CiXXiH1OmPMBAsREFi5x85pTTx/MFnMK8GOdJrE

F/TI0hbOSN2teeZAYGiW3khEiDfh0O+gkzGTci0aH4
m5ndZ91iwmMTYsNEl+Rd+radhA5jr94grgc2ZSN3UWueFuAcYNuhUWAxOvg0/LQQ01Pz/CfrxLGL80dB

LhG6Ra1THEkaFeg1w2tD7Hn83onSkaY55xtUl1kOlh37rROOT0ue/uZtKqV3caYeh/2lPSQ3F+rVvG1p

4mMtGP09lNJypO+EjoI1bpe2GllSqqJ4yoTPRGEEtN
I9h7J15Dhuu5sC9CcJ5Id14TBuTp5QTRfyeV2lMRQbw1GNw0kvLPRrIytO/78fhRv0NPevZRDqAEuTB5

Pi867QBMno7YbPaU9APnUxF+S/IwG1doRROqCXO5FAYsLLOjJJIfW3AGCHfjJuTQLcMdfe2+WyZfk3zW

cP4ps9u2iTEgEpZobUzSvAwkYr6Cjs0R3/mxDx23sx
W5kbBz7HzYc84QCdbIp01Nl+yQDN9MXlMaJTFeyMHxFyTiZM9pvqmak4fci0Lp39W6qE1wCE1FmfcaRq

FG0HVPY4haECHgBLfb+5ww9sN25Mj9QxePxJhF+d2VC5Ln88z/tonYTMMW8SAokCmZFCClMaxuauqXx6

WAyjJ9h1rmuOkxr0yl51fk2SkWrEELZuXTj74vPADc
F6xfV2AIumnhteCgTUe4V0KsNr2dh3XkXPEkZOCVId6MFTvRnt/A53nIoeeOkZnN/b9U+VxT7tj49zbG

G/Svg9eoDNvR19LTvRQTL1gCgjy6F7OxvYFzC3DCeQg4i8Lf5OumtdF3Rw7oZMMkqFT2fFvYsDJPWctI

Xz2L9v23T2MGBc11S2fLZEyEeyGqn3xznOxh0lqGNM
tIOuWDsVrQf1TqCqlJwD154MVPYKutptZ2ndEGvUicte4DsSY4+hI5QVBKazNGjU2kBgEkSivGciz5Cv

QkHIBW4Ci2wqa58PHSx2DXFS6nc2/LNCokzcbo4bYR/Ih0VpqJJ97rtPls3MfiBJLHi7v5U3UAq5oVAi

cAXO+dy2efdUflDZpPR3V2uBD+EBTaXX+RzAV42Jdj
0n3mIJkfBc4NijRzMRrwBKxCarg9AJ1recwAZoMa4RGxCu5gCEBpNq0LUaZ0tUjLr2HLvT4kWSBrFUZ5

RjQ9VRuV9XjYqK1NIxw2EhtkvcItvSvlDRJUAOQ4Rsv/1XpV4Fiig26jF540gOyJ/m3qLSXwJK/ASUQs

kZr8WsJdFLaO2Y3FFfw09ZpJczBaUb563TEJTvCJkD
A5XjbP+jIVjdth+N8tGBZadNt3f9LCONNI6gmN2xpQuPq5kDS1DvbtSw5z7MwEcx2tXIwOY+lU8yevrZ

6tRK1Wnm9wRP+4jDfc9DpdU78RxLO8Dq8h+dRzyQpX+ISJ//z1OiSn6hoowPzjv9nLaw5doNxJ9C6918

JqsZlPIGgqXj688IeiCnTxzqwbk9oeCqqnspsVrFkP
paWxBZ7w8ilhpmdaP5I5ogY7U5l8axXYdFPxafkMF/paSZAI7P/Fpzz2052wVqUaNtGH+BQ1nsjwKYhv

50Ofch11q2xe0EnXt1OhyarXhOXpRWSu3B5iDeXFfpuELy4irxv+iKU0UzsGXFg4L81uV3KHRXOlP7ro

DhKl0NQcWsEzUGac/24xUp7NSqfaWxLgo9B3AY95fX
N+jcAPyDUQQjci55glx/ainriusNRXwrj6z7vX7/aXoJWEh9hSF7pim7JcSq8UqgBGzXOCgGo8+fFNQz

TuuSQ5aXVmRAtcuPWZQMz90UiYNRNm+zT0rzK/sn6e/+UmvFa1DtA3Gscr5vdsFHgDFEESjNwxo4uJ42

daAJ9/mJuK+VY3PPSmTFc6YAfjDTSbmx5ohrQzDL3v
mFExr613TNyoKXT5ZcvoD8s015lqn1i9L5UFX4HheCSzgXxswFjtaIumtYBfRWRZNbesrI/tQG8766BI

ufwogVgnls1rqfaVaFvKNRLaGNE9z3FVfAa/uCZ970+XXgd81DhuQt8oNLT4MQ7TQlFks4e7+oy+gWaD

2CtJOK3DN9EivQciG31CzxZUbuuJr7T7EgJxXJSXa/
rQ2R8xuqA7JHonJghCIGI7CH97r6VE38vD5tXtUt0h1ATxTdIgFSX+y8Z1MK+gdalSjFDc6CF12+369X

zPgjZ3eOeuBMr6nzy1EAveX9se5NNp0f2pkaMvvZgvB1XQt+E3w3j/oX0Bc1dA/9dgbv7i0Sl9EQdg/v

KQCusroPwDsKaVQNCBoxDiLfZqQAzM+Uog/ttQTfpH
4JxF72bzAv02mc3pJJ3odcxp2OFvlN7lEKHvHl95w2iCqJEsTBLOPKu+WqrSTrUq0twmU/moMUdj6KRP

fZ7iVhMqruSaYwpq545/RVzAfprHwEEPBhLSNSpglDEoY56U9OXUKekWxjp+VMyPrXTGA/cKXHUMS+yP

uosUwDNw8G6sEicQyApTf6pNWtS2ihKen1MeKhbvBF
xvvzWMWmKq53KYe26e+/Dj8T5EWl4OvAmjZ9KS3i6zcAnbUE+/pOXlm9v0y1pFsHAs380NVOwI5Kvto2

A3GKWwo6wOhGmKJqusE1C9wV1z0G6fMguxzaMXobFvIEnEqVyMqvPsd0uklzGXS40IuzHb7uSRc6QZP+

6QlDb7/mK9bVvdjgkBLvrfLcbMc3o1NsL0wekW8W0g
CqHu1APjmIiz8XMn+uFqPShplz7bEwsupvYd7qIx84R22Umrx+8X+g0gFKm8n5fUBJWQCZXwzEdIn2s/

0yPkeVq3sAxO3GjdFPgA0jIl1lulqGg940VeQYj0busc6WdxV1EL2h/EebUQITULUQQAmY3prazN3HQN

Il/NtSD0/b2G47c+IzMWOmuEgVgRWS51fJ6/1a2+PB
5iBx4sYhiuDpuQSmXa+LIEurjQ3hr3/BKgVSk3U6hq9ra8MrxUP9Jj9qMJkE0p+7FLcf64H37x8HmQ5g

L/4eh1M8mmcinimZchjg3posJ8NtaXFcdOd92K3ghtLPMFIuzop0EDuFmrjrxWlO8RD+2Tu8plc/GBS8

CO9okPqpEKxpDNpWaQl8+MeOjtRh3Nm0RspjzMJxYZ
9sQw7o7AAuFm9qr9aRNuwIFUX4EIqy+o5oWUKwKv9tXLETjgurmTEmN3K4hZDHCsVy2H01a3Se5/ak5d

uSDbvvton+53O0lkzZ8sPS8mm1FqGEVmDydLjH0Xk2IkrSgPzb/L4YsVdNSsSiBeF9UOSyNi1VIHwGmW

Z0TZ80L8epqQisWMQYU5sB6HZhfo1dXXqplzvLCyh7
yHeiOiL9ULnjkk/MVeSzKxUYW+wRaV+qAiLzbr/EXFpdZam+n8t0E4E7WjDozV/u8YBvUZN47eQ96hov

WbruhWT4xT/VJB7f2j3cjJRqxBWsxWrcug7Z64r2A6qCBoZbwK770AseEkNLmyyVHT+GbwkpFrXscS3h

BANQYgjVhksyLG6EtG7j0RvWWUveivK0rQntQifmF0
MXklRsTywO1I9304fVHtGkBzCY4h3TFxlDlLho1sdg1LoFvFe6tCoqzn7DbXXM4T22sYj9Ge+LCgmULx

D7DWuello+2wxd1iw5fOKxrmiA+EJLsquYaiKuVuY47gHxUfDjZRKWBvMJhmrwr3Rn3K0TEnakcqgCKX

jMZA2H8iqYUbUoBK5so1WOZtZ30VQfBCkXCBbd4DiO
/YYsGrsLBdZMvzZk4+K7nZ626Xcb2oC8j6NYOLI5CR8Xg84OUkpnaSpuNz9hHJ28P2otxz+yVSBCyhMn

VHlPYzSohUc4tHw1tgSfs7HEf8s2S7ep+/WLyiXMtu80zyDNX96KLDqPvzQkaNO69Yl9tW6iJ49fAhOk

FSFVIEIXUhxtNhiIcnnrd4dKU94iF6OtLqT/c4Fnun
pdZ45kfVDXipa0tzuhruCHnulDPCA3NbEX0TLrkUfAJ/IR8PSf+fWBvdEjgQSymFY2SuSCNXJO1BDZai

mUpHYBL8BhvIdsJCj7RhGV/t96Gkte+lTlrUEa6iLwV2hCTutvRWxVkKCerLTGrZ4S6Z90C8hxzr4l8p

z3k/G/s05GcQlsqCgUHnT07X3LtcZWv4T9Vbc1hLo0
UV+2++MtJ5nKlQJMB4hCjXj3NT/ErupavEoE/ZOJPSdYUrpmDPFlRxkX1b3u/7AQIBXV+zugzDeoT/PP

SxsWySkOImh16tHWGLwQSDLZWkvHLLpf7a9Kyjri7lG0Rcg9zURsQkEL0JaTU7QIXo+3+hM+t20aewB5

BjyG/Jm81WmInG57/vMmMmRwGnnJ694baIZ05Zpcw0
S09taExnfgxL2pkTYmBwR41oMI46nb3Xn9gx9qeE0wgAL9mloCJN+VVf3loWVwMEpmgKY6rdDeqVKnS+

VqFXZb9+KRxVvjX2WWA1MNb+uQyda9z5BJ+y0JYarcitUEkyNUx/AfxwZ/YmGigA69R78VfhelEv37Cn

EiAQQgBpnFUAjo7bJS+bUfP1TLgB4sTq3gOVa14mp8
/s4BpWGPtpCqtWpsNwBA1BGUbvLUE0gfczscxinRJ7+njP/WnsX1tv+Ndpved3+hF7WK5DqK74Gzo8v1

663BI2zwOeejUPkjj3KRuPOiDudyNNbBW2xl8FdilxiyuObDOuqones9O2M5RD0/JIO3hoUgqZX8AzJT

RGds+5Of6dFY6jkNl+jqkYZDjM/+qMmyySAKixFB3A
5ccY+reNxprXZ7aCYYYjtFDQockK9o2A/F2no56NfAy/ii1FLH/bLF25RKFk0r9DnjVDuvOD91p8Y7Gd

wqSsJHSmhP3r1Z0VTHHaxLNavU2Gf8skGb+0v9YRQZxo8vUKHC71mCpo5rTxa274RzHqlxEjcdKN88T2

Fn7IjxcOPvWaGuFDMZdoOuHxY7q86C29UXSuiE2mHA
D71YK/faWveGlhc313SR+MLXwC0ZSOAMz+0pBdlIUU3X3eY9BtPIqBB0vG3MfUQ674MLwMjUX2NRo0yv

s6hxyyOfwH65AYlQb95o9XpF6l7dFetZeLzywUmLaqE4ygvAh+DyVJKsyi2JTcmJrGjA8zA7C05YoqQ0

Y+E/cfWQ2JcW33Ujxr83O0wlFUgItdFkUj77nW9giN
SFcfIsZ5dEyMCTOYCWxQKm2c27WBACq8Q69bIABFAh4pLv04IB+pDGvUJhzptobOIUSSk+gR7eQuMhdE

JqGZq5jWbDZIR6KXxvxvovsM5uxnhDFvXOkyNzBFrSN/8Et12nFh/32a1b4jsp8/RVf3B7kEJQ9hlZ8j

1JQ0guB2DIpru65xx5NDBcwYAKAVFzs5gtMaHJInBg
Bge5K5Qa7hG2iPBYTJ7z3cM9s7QeUIpKFbLsCLFB6tjJaMQy8nvSUUe1F/nZYW3qX+I64eXIixiLJqCZ

x/WKoOS22fYjIbt5SUie2Gsr4jHeu6g2u2DiFpH9IsHJcRlJRnWw6w84zIagSveaCUcn0z9pCvDuB6B/

PWvHDK1oSx3//J3vFNFVl4DnxdZZl59ge0vFq+dEz0
jYUNtY/MM5vICgYjhBU0Iv7SvFJEr1BnQ8THkObnDsSAPaaZGYWKWezq1cepRsS4ISnGds9f4TEFrF1L

5NfWqqv/NPncmF5TufDUkr9dae2LxcuQ8tksLxfIu2ApBJ0Ek8nnrtJY2Lc/FJr8gzL8uBrgexY0wtcI

sv6d8tLlKUDLgUD3ZZ5kgJ9hG0SKy0XRx6/fQDnmAl
eGxH9nWYWOwVmtEftAShHV/noMWV769+z2KNl/n1Q1q3aZ/kyPmcfnb8tZvLCZA1PQ10q7qH31qucp0/

7cTtzp875b58B4SP/STS7UZ0qB4EyB/ROgs01xtM0d6IwKnwClqRqTbiWfuUypsNrooTvsUYrD8MDc5s

7xGsWOkiqDiqgJ6WJHtKXt8Fk+JGn2TtRrVPAaJ+DO
xgafuU4sssxAKaxvjArHsLkXPuHG9uqgjgMUswkE354TXzMCMIspOGXRoME/uBffNUH7NrpH0Z+Yqu2/

OKOztxx++jIX2IUUKzFSSUX/uG2BUZOn8zZwhVsS90vkXEcuDO9yDxYSUPjVol/whZsqdU3MZAOlLJLP

m7QH1MokaLA8dXMicXjN1Xp8zjldhkYpcDda2El147
WXuGUM5CDIiB1Z2DODzkE9H3Q29LIzJlcO7PvVNR3jMQ11W7qt32hR7iTA+MBfUhgQ4C4wmfYcA1vmJL

Pu9D+8x3PDNA4wAgnSBTQNJJ7Eyvl+7XO4TUuQY47bRUzJ5WThzhDgKhHz6ndg7m8ze7aDX7vYQ2u335

yBYsX/bmIFfxUZeYsmuJZz5zS8zeIRTqPvZw6lGpja
ap6quBXiMlvCNCP2IdGVgS/RtJi+nrTfMYuyMrKcR9ipE/vjvlGlMH1MWStVMogjnoVFLtFvwFPIKELP

OUm9/QjVCGqdWRQW2kHKx7MaaBFGEgEN8vCwKC3wu4YR25L5skSG1KDgWTM6aBbuFyKFB/DJHq/LJMHX

72/TBITq9mRewNCQamVamFHbA0CrMZVj3jQ+cVYJHv
mUMkNakR+pbDeeTkQlnUBu5066mLV+3kdyzIfa84LcCIXoBAIehb5BdkY7Ua9u8a+mMdeXk65eKMPGTc

ffygA2yFI+g/1s88eecO+zI1X4jiqO4RSxeQTSisonIy0mHwQgQAq9DRSMz/E9gxL3Fqs3EQQddPRXa/

3w3p9kuwopnBzOBXbgztYoteOXouPvck2mwHZRuhE7
Vz1nlS7mV/ho/z2rMxcW1GCpGOjl969AF+Fn48VZTBHZ5Apm8M86R0Yxy1mIVO1N5tac9PK+ftsxcyb/

XAuXyuSSZFM8HhXlAH+IWZleDQWncpkQH7gdoNBukC35kC2ly2zYEG35NlfXQi+R3Gwe7Tn9N+0oTnch

Ro9xEklcrLOpAM8pGMX4DHaNbnJttndfROA9CwOt9/
uW7APAI/LV7mvVxn4iysh7dS8DSU9ZDmWZ8j8mpaejxAo2K38iHH8bm8otj9r7SGZ5oZwFzp7VDkSOG0

NH/yulEQeMxSFdUdLQGy77qi8yk9hxcbUuENx4tSI/4Fpk89KqXkNcsbMUbiaR5ov8jEj3/tbwDYDySv

5YLv/Ve7w6FiUPcrt2rnQv2ud/K+u4IsZR9hDHqo7J
ygF7NCW21SqAhjqlBhQxk7Qb+tuMp4F6sK7WQiivCdC6m6jynewW+syQ93CQ5QOnHbxyze5bx57JYbGZ

22eBVcY7thFy+TQRKNYyeNGxU7lj53VbwgKWh+JId22JQxvT9U2FF8NHbINuwZcS/vEglE+axDkBXAgJ

nU/3Y5kIj9iKD3cgA4+mCsGxBwYT6Ub1kU3PyLbSL2
k0rDCvS5wjkce3SdNtBzLovdJAUy0PCH4ztuwfHC9pioW9S7C1fw37EGgkxiGDfsfsESedJZZF56wLEn

+/l+ggHN3yTYs7z3UNjqHPcac0YEyxERlSH9omZ1Au0o67SdAofYRwGUomnPIwpeY3gWXdXlwDN5Irfo

x3n1DWXJwiAt/FPpe9yxm1HP2m7iCM4FHZGbDvNu0+
7rNRCtXhjM4i/NIOx2YRxaBdHpgbN04LCbCiTi8NT/VK3h1Nc2MRbjOLqJOhR/6VYrRv1AfJc1iAIKo1

FtLrOmFhgA1aSYvcQuFF062ZYs5R6R8HUoYk/nevEIOJrnXUvW1VdcDx4+9abNI7S950ezZqD0m9kizu

zaNSbo86nkQLzkTl26lUuElnJHL7a7NQyz6vgTQc6v
dpv9pvy+NBZgWnY5HbqH+13ABIoJTzcMaxnFKuggA/6dKknDtdTuoD9vLPrQ/mthKJjns04EC8NMY7c7

2sWTNv8Wik587B3y6t47fcsuGfFXyxpqxW1LGtzFky96a0lxEzd8maOob5Vx0c3NF5xXZZwoHDZyWfyl

AuAT11vpN9CmshW38T3Ti/OoV1qjpyeh+OmVkB6Qtv
WrpbBfTJeUer4XpwZUEx57fnD2hS1pR0P9hGxvJhv9cU05BM39MPlXtTi60UOMbocRTpToa381TzVP38

L00IuoJcrrTTYS+juEJSJwziZr1vSiLsJh7Nq3E1bdoWWliJZkds4nTia2RQq5RByyI3AVsiyIqEsDvh

AX6lZVG8nEdoXlhuE3BVa8u8zC289VCr41VDRW4WYy
/7QWY6Vo5Fs8HfG26N38wxQdtQv70Tjsa4LmdO8oaoonALCAF+I3wFHdAifDUXPWwxNnuQjmAXgHdZ/t

qXm9FcoKdgir+dCE18EvS48u1ecQLMcEqI7Wbu39gzza2ozjCppLnIQ1JLEF6G/gSkdPmq+RpdSP1OrJ

x4lNQYX/rCcELBBpyC4QjeOiUukAEXCYiFCZ3BEn5o
bUrDsTovJzv6+CXCRwkGYqJEMlv6hpgDakUuWlr71/v5VIQuPn7f6Ts3TJk2kjoPLSpQWnix+k0qXYMN

r026UkjmjLnO0J9CITI5TSYw99G5M54K16g8JeMSYn5DlQ4OQWnItKOSPmSUKGRxet2aUUa3lzmxftgp

6Y3bpgGiJSInkxKMTtN84ag9MgDw+uEHAoYBYDsyFk
+3ulWxgJ7K0MfNVev7jjm2is0nEJNITTz5CKbDOdx8IRAlloApH4ELJTT6ge6Ri+HXK8yzoatkySQzoz

PB4pRxj1RqjuCf9xuF6cJlqXk1kUkPoQrPN9w2O/wGP613gJ+ZN/D6gpUbW0L6n6yS2c1gLC5QOxnwu4

spAvKhIBYTCgRs+7WP4Dxu6i3NvFds/GpbWK1L1jgH
RNG7crt43mtASfnsGiJbuN3u9VPL8QnIv6539H4mYp6OGsNJrW8irsvRV8w0p1fLeYBTZPDkgYklsLxR

pURXOMdr4nIXHzL/W8KARdZznGuhoMyouctRYKVS473cWH9whqdbpXaz8CO3s0wLMVGT3AFCttLCFucZ

pE8QXt4U59m09Itz6BW3qCQpRxlK6SgkC4MpPuRtXj
3TuoBK9Q/NV12uAg+LLEb+hWnWf6zl///0YweHf+skmgO9GBx/siDbiaTL1PnimaDvbFPOdCtbAvZbku

A5D28FAFDCropSHtnMj4c30OjcRhz+ZKl/bM23XWeBCzP94Bk77lr6ozLBWsViVh/BrGVH1O2pmIuV2/

epS/s/q0WnNujLlhTZOgKKJxajQP7Grl/0Y2YXgZaN
qloNzaamrP2RVuURbc3Mtv0cZJcejjTFTCvHs/ofR00NYN/oMvetH6YxxvM0v+aZUa5iZA8y5t8e5klh

KImz8ciCqYHboxtIo/m+303fg0BfTCKrpkWPo1yqAmtFt70AD58++nqCP41+H2gDzr7cNlGMw67uDj9d

za4XTLBH1iuypLHrO8Q1FUda50h3XV8UA3ZgDX3Xjq
wiCEdOG5+GMpMQ0USbT8FbqhKSzODmO7CQmY5pyEmmd38lHC+t3eAAKz1RO4dJNFKo47s9aolvkexYuG

sHD5BFWQil1Yc+kWay631C+ETBA4JGRc/PpEwHHyGSHHo98HlVREuO4YXg+0B7P88RRCcBizZjWno1kJ

PgmRvoV2FY5xVp83tHHlE2AQIl3ukQptCljofeGsBI
2vjdEs2wboF2/YEekzyenx9eSjIv7dvgMa9zuxjv1LXoALOHe7/8rDjk8Y3yoCw/gM4DQBpNqUU5pMVz

LTtU38+tRF8+tnJkaXlFe1fN0RwvMa5/j1mw+UvJWru7DwzDOGOfW9DMNHfHbwNp9ESN1oESoyNsntpo

nUBbqVxUbogSwtjZQ8rmA+bsjd6oYe6HIdJ5iM0/pU
QB7M9BZbIBn+JUK3tmp1JsoT5vp5/FcYW517DL0q0MhNodqAVnbjkRnqL3XzdPlo9KVSfAffwgOMJpyU

t4CtQDK+CIvuUi+C6dw2xm9KLGD3EPi0QgAERt2a687e2xLJP+h1eqqsxcyjF0ZOQAkd67+2cqFQKy8r

XOq83oGFSBb3BGeO44ZxnJnlbjgCyPy7mJ4AiAtdS5
wpuMgBhlYW4IzKpSSerMw9E4aoRsvbhYrk2Gy6b1vA0cQPj8itEWHZZprgRtDeLp3CuIBEO6nQLAgPmS

g7+rBFcH8DAc7p5wbJZUQEbB5OtRmGsuZU25Now0qHzVJbpwok6EMBAL6n4SNR8IoB6ziPrU9wO6gbkn

iCWyJa8m5I6qhJogWaUWyuavGhDz2DHWYA+KTKZPzc
XAQcgfS2aO+Zb64YOG2M7sAT0K/HNKDEpnHFQ2ukOup6+lea142D+Gf0/L0zFkCvqA054tqXHAwbCAAK

2zYwX3I7TyryX/0FqI7aHIpk6QV/KPt3G/VxnRf1VZxcQnG/rp2Zt0zE+W2f3J5MFzIIEtHaBRbrx7rx

DY76lU4u2wSAIVgk9ly/jY3u1AJ4LMoU05foiu++cs
c5z4xYSBBZhiPa549DGX9KGflYItq2XN8QsTqsbyMcIh7sc9MD30TDAfIxiPORLAhtyVrfruersY38i+

oHLtbobGP0C2zK19tYqfVm//3j2TOisvME+5TveWd//NvNMUCrAIMAY6NdPYxajykX0jjjOGZaoc/lKj

bp//w/1AxtlZ4UYESgtDMaXZiQPpvqkn2/BKVlrOmM
p74QbtYwF/sFESIiXGPjqCs6772pHhXYHTnfOuwRUb/+UcoXdaQLdA1pnPp/iKRnrtBVEnW8JEgTKnc6

L2+A5DJ8vw42hABMWKQWwhSrU+qPDirfJXjn5/Q6cR5+mfhu3/yeoZdwjOx4bK8heDNZ2EtPJ9LnMvPF

0tt7pWoMlFWPPW/cx3RbPA6Qp9Yb1b6tg4KazXkq3t
pFhjlDBw1gXDlNWWYnK3iKt8vR87qNh8p7enBcx/rBR/6hpdK6Tp4RAvxZSG+3aZDMPliJcFcP+iaUNB

z/zR/4R+1JmMRy9c88qDA8vDA9iz9rL9rSrJZ0oqSUERCqJHzzcmw1QxlQTlA6LFEuIIoCk5zrVLAQyg

3ZF1WoO3C1nSyrdegAjStCGpBQpdRoCHgzgJtNP99H
iHc+/rlgLgqaGhiI/nw7MWd2TjysHZSvkeqfNVHShLSWo8Uc+Kb5fMd9aNxMrlcB9m9TxLHjcjXrIVkn

mZ0k14xh63Mn9sohkbbhO1Qq8ly5ex2dIzqoEWQSKW8MWfkRm/fFr+8CGqyCfDodIGM2Ey72o0bOYr0Z

fkq2Rt+Jm8SAic34B6TJnd23F5dNlFyLPC5OhQcK2P
Zwuj06i8zjykNb8QEysIqx3vpZSMW+RJIIRw3VqZoU5loV+/Tx0KqLYy0zhDFVZfKwqTnOGsGs8YF4jb

4tyF2URYdb9SGCJFoo6Mz+qbJ2AZyflrTJ1WcKEmjbl33iF76Jp6cAW/rucvmO3oiS6fU1itGxlUCZIL

Ush39GPslHVt3kutJYArlSEV9l0RCbPDKEU2p7jPUv
jPspdJtZSfp9DYNGOtoyAFZiTb0LKgkv0R4rI9GdjTWPquety05BwaO3PRlQ0Z0s4yBr1I8yUWcmdFwN

ZxARqJvrptlusBrGS2UFVsS0Bw2xmm4taVDQk4ItcFwRUrYZO+ypycES0RAMT92U3ezjWbSjJbCUTF7I

mvtyY5wKxUG8S4XcjlvEciESeWizw0n3Xs0OcyR39I
z+xxEK3U1Jw854FQVI2GBZCrHSEzTG2X41upwYYvHggsdAS0NmmyggRMyXfWBWZZQpunF4N42V4EqZob

/cMcIt/y0LbV4c+dodJGFgXPEHqDjEqO2gl0kH8N1C097+eKqIR0quGS/u4EcnYDuVjgNcEfyYBQwLPF

LTvHGrfjjhB1y3QMVj0AhRDCa5m5ZRJehsEDbP/kNX
CUcNWSpeBU3iV4Fkt4tnZO4BvQT4wyjjOKRLSTx2BcxPGDVl/haDKqmDcX6s7BMvyLMk/vA33zH60Pd1

Wb6+/L8TgoCZeI4g0eRgwI3U7fG4jckIfbgsdgEbzEI6eDrXB7JdCm1JE58A/jmvFRXA0/tlrF0BHrHg

y3Yg5AVZLv0Zn7dMaSFVYsBpvI9XcmOmwELMTu/8AY
6TFH6KURtaXa9iMg7WrJ6+dwPsrALRRWaBpD9JQBNf9wEoUydrb19eQastWr/XsZ6YQTyhKefQVDkMwm

IKeZPQ9NUziXvOLDOAfxezpu3ZUDF09rm1qF0MdwylVOdYphx4R6DRXQIIR/D2ahKoMC7V3WNcwDnBX5

DexvBZVnt2UL3NDXIMzZrYCTmdUIUyCD255IeGC3Uo
b7a0VARCHZ2YgwtVVImoS+3XPvfbjj/B++x2+v/I79x6flYARrzWqmiN7n14NCfRUZka/f+WVziaJ6gn

q3Vuee37QYYwUP8tyCxiZGW0cGuUh+jYcKyUqcTpinkLSarHuKdoH7QHXx6umQHHAXFPYlS96X/BHT32

x0PeaA+0b/HiC+yrWzZ4ybz/BKj2pmleUV/ZjreecF
oEq0I4PHquUb/14jKUGWC7AUDdEFFUBHWyOBcqf+7FhxO/+WZXULMBoNrqtQYsdriMi+XvOx6PFQoVAl

uRi0eb/L0AyF5Dr0MvDNzwJzhhqSL8KznghQOCw97Ce9zWrLMbM/GGmbRoUTCfvPvbUTuVi4GXi2cagW

UcCA4Iyl4PIXIrmPLh87njkS0L+UkPOZu2YvEY5t6R
teiSBwy4kVejkSHdb6q7qCe21takMjvfNWEFFZD1HMyObXOcZWh7ol6cyIB/sKJ8HNTxj3RRr0K8H6A3

z8TCPzG2ToyftTqRu0wguxSLWKU8KTkUohtr0zkKV6hzoV0M2Bq+GkHG8INkioSTl68A12OMlBz57iDf

Paul+OTP7jOmnuUZ1Yi7yv/3XSni5h45BG5cf4Taknn
a2JRms2Fg7yAn0NSlS+Ei/t4ZD7WcuyXzr3Qt4FhM8x2RfDfZ2PLKyM7PwgqrADC+jkVMPsXHxzNquJE

HChdMnjoLdd3y8mPMDNBuHPZOeNN8p1M5mcZq0C9fePILZ7XQSsKE/fnfC4EFDKHQDDGxOodGcIA0Vit

ZPFkJDT9Sk8Hwwe4Hv964kDPfTHlJ1ijfe3HepHLKY
x3Nk5i2qDhsVLzFb5XLpfhkWvH/99qoMSkZNE1RWPmSHnuSQSnrqX28Z6swEoLTJNxAyR07vcxCos4SK

HW4TbdhnGF7YOzyKtlj1S+CliOht09asY6jY4jgQYIw/L1kCK1a29lMxI/vTlzsYI31eneVewKddKkQN

3/PvtitiDy/S2f0UncTZ/6r9lzIrq0vcDjjJcFEDJe
qmx5DysQmWWaF4xEqGssbnjykRTekMc7m1UwHdhHENMNGpuhFOv19rhqc4MTAxd9Lt1CQkXB78hM5U/N

VyI++zzjw2dhszvPnUzglIjHRyRJH6gzeWTl7+rQFG+L6VgbRrlHFHNLoa+/qj8mA2pQuWLMEhLjYNnJ

h2g4kwL9B9Rlp4d+Rrj/VghKtl72ANt2zUdz9AlLC5
/SPTGqu8D9VjyG4i4UprePBFV05t/q7PICgRp77gyQqzuT19hVl9h0g5LfiA8tWxL4qOS0WDuclI+UxK

i40nAm1wV9LGtnuPJ29iRhL81gymYHb55zn1j/3hLpfjHouFM1T0Zx/6xlh17ChUIKAN/vCumsqJ+MdD

6KjetK3ExccybP29U/hFzdDLufDs/wG2cYsi4QJMnR
tcZUdFNEaglOPbYstMFRmRVswi0tgu/E0KkvuRNiMfnmw8bxThvebdgmPa/o9DVt4dGfhqTZ3mlF68d2

VMGuSsA5VK88Or94xy974L/D+GcneU+9036bGCuG2Mk90jmXiW3LoFQhKzrJSWHMicm2dGv2wRq2fgHk

ok6wq2N5tbidwPP2SjfKvspSZI6Rw2e2LpWLKQuZ3+
wcclCmWxaMF1sQ7tr3iRPT9LlaVYUz1G4ySg+S/nPiYj7RBhSgQRt8YCYgPQEzaFY7T854BaQ7TbSopk

my7eIXgjzLg0UXlwQ7/CZ+0ION1xxwioJYh/oHO5O4QXbltoiRRqYA3gyAV9YeeK8fu5x/e1pjf5nwkJ

yY2JqY5tkMBCkIzfTnyPG5Y//q+3yk5G3Rd7RQY32B
kv+hh3HvePl2w0Ta+DJ5UQWgmu9SWYvCOT/aPLQsQQ+GZ3fi6vm9pLqVW0Yx3WxNbp69YGgLsTQW9Gkl

Y2J66fpHx5MV1Idos/Oj8tcZQWub+GxLftl+eFFW6kvJ94yRwKDdTDZhQyLCnvkLFyysdgkHYXZYqQrH

2Pcl0jZ5ejb9ow1zoawAXqbgBpZEOqgP2xabdbvKYM
sxt3jUrGwlE2o5BAY+TcUN6fmLh6R5dy6YvTYRjGuCIAcfK4D5w1iDnztXXMeSQizBgBimeyh0ZpyLxP

QgxPWnXGzx/Rv6au0T61IRa7bRzAZ7707VBsNDefk3lUJCJkk+ferBSnGwy8MieDFhP/rNzgXA4tiI+p

8II9ni5959SkjlkJ0OzEQ8eUvjtfGw4WTagzOHBRDi
jAz1glZKjFb+jRsPxjSG1AWwm3/WMKm8HF0xGU77VKpQPN0GERaKd8qX1+wvkcIFFy8o1XFv83FXlPCD

8Dv3boPjlY6A0ceM4YTkdEY40wd+WWDfy5Yqot08KNLPSaxNyXfoYOg+0wHb6sLIyLt14+HMyaf5baRO

b9P0Zj7EXF+AJp97pdOQA+6mUKkN3EJLx36S14ImvG
gIgWk66fb6lr/Nz/s/ESl9xsU+MPDQkhWXrA2Cc4iR24hwxny63lKJ+Ik8o6RWHKHaDlijmKmlDNpiRv

uMHBhSIF1k0ogRGtat6+pdSit99DDjtIOOsKM2H6jReCMVzcGS6ujbN43axt4D+OJr/I9H4f9im0KK+J

Ei4BED4palczR1pQMcYVf5qitSxebMP6xPsyt/KwHv
Ph9ALe3cRfAuHVTS1I3WJv3XYkApOO0MQf9Wnbl+dWo1cCHCwFcg07DC6T9GzyqwB+GEhyviiMvWdfBa

BM1GL2Fce4Py2MDI7eyCWKr6LwXzw6tROwGyU3cDRIoOnYkeZyfv7z4mxKVlROeISFjUG04H8+/ZVu1x

jqHHdaDoA8etvNLBCi7d98DftvKmqaKJAGcmKgJwr0
V2ifMEkoasNOwlCdFHfmuHj/uLpBEJJNP/DO9SIoO41mdqZZMda8QC6i0/tzfL1XfLynArDE/NXsnDSS

QcTSGI8GEJteiur3k59AcHxAVIEf9qy1r3bxKUn2KOQydxk836t2M73Uh5+1xz0a7tgTElL9iu2EAf5+

Z1mKg2bEl4syuo+E7JRXaI56xXoL033iggRVDFd5Bu
pjzAndpZO849zMb4JnmyKsRwZNbu1trlw5Jd+qwxPtlGl8laCyXTZDNyqjEFCTp7X0plcrAq/Xcw8mLF

O9aJhuHpSrfgj2PpkpNsAq4stzHPegcVqvsPko4Gxjx/wi5MqZ8cSw81/Gcpa45eRE3GvbXyiECCl8G/

f0TpNQr3f2S/0FIyWZFofvRlrQjJLdHQIJmZx2V20D
JlDjBBtSNNoKn3FMxlSUYOLtPVsR3uJkMyWXt0Ml3olJj/s1JBPMf79SUHzZkiwri5jOWb1XiBGHP54e

xhKIFg7nXixmkZwY9eT7g7E3fBS+vc/luV+JmRNSVavietQjBP6XD6yIB3Rx3kt9zCjTWByoM6tEKnhd

3UVQ7r9WTqk0WKgVeQR3myC+RxRu/JoVx396z//OpH
VO0HQ0gLoT/n7EhgyRu+UM/Tm29dYpDSGsHpsIz/wv2r15g4e55w81ab4/4Yi7kEtFLPX9jnXnybD3vm

6XcJfF/RS8pSX8ybciO6y6pNlhneA6llkzNQcUtElGkQH7dmP9gC0aPa2ST4fgnEqXr3K2GQwYAVjYex

DSzf4zScwNPVGrl5DMnq+zIEpg7B9G7wV4ULO6Dapu
yi6D6iYCy4lA9h/fitcCIKdL7PUW+0WOTmH4/bO8A4dtEg06zETHBBpmzvKdglbmWZIpqu9vCV5rZXat

ZDqf3nE1D/gNQ626jOiA5mJ4BSRvctlytPa+nzddm3cEU/9hUQ1ctaOwaNFcsqysXCxrXftrDac6AP8A

lPUc9mJexQ2aOVy6TcEzlMbqcJT/81fgEbXt9c/l75
dMklLvY/UR38YlpLMFlaUyvP9WvZ545AzTKcwDB3oqEIXWz7JxZc/J7G79tEYVJka+FR4kiUGjynxdgO

06gi+yggKaQcjYcQnKVn8i2X6RSaha+e3dqOcWrLepbchnNddGgh0nhl7fWn0BsONPXhFHlqwEgyshsm

QmSfOfaJQ+57OCBdS09r66fIxWz/W/ktaRDGEoj2QN
6Oh3JeZhSeFZ0BwxCvdOaa6tJKgf5z/FrJpkg27iagTKw3rw5nvGRtnJOqVHfNI7TRQMeeG6zfU9WL4S

bmGYAxx0ZAQ82d8j0L5/JdZi5OmAwgWK6RlADeXYKQ/wica2MTgmJypciQfzu6Sw6LSUF+uq5W9CL2uh

WV4Y4aiZ29nl/EZvzcOzNCeqh+kvPUXBMj/vPObZhA
edrrDiX2kEWu52LLPgFuhbNeWg+HbMyPT8WnDo8Y/TCP2DLwf1ZJVCz1nNkbT6n7gO/8p1YMsFZopdNu

hXwSK7Bg0iuWGjsb5LVVc/9ZY1+WV5+k/vXLz/SP7caF8iUl4Qw/ywcLwCsR239tb9ePzhKe5GDL4CN2

Fz+zOYFykD78TT2GQWBWXQEz39GIsSIeNJ/TNDutsk
JbOtqf4dseq6H/hoRzieRidZp3BRv6BgJExxUo8Bwbrl+76YgPYtjbhnCduYEPo6zXlsbiGbh2Us00bM

XiIpnqPKG+/gn5g/2mLbzPzTfVCvgT/4eFc3gyxzx7y2vT9YZWTCSCvXxyDhsP+MFDSpDfOm5ialtUgy

NO0XzbW/gcfdQDmjFpAkh9LKuZoWPJ8pTJes0u9eGJ
SkBg/48haq45z2jkTpdgNIvm2llOTikBMsEhF33mcPOfeBU+9CdPjKiicPuKVXmaUuCCq1X1RSZ61UFK

OBLUU/Ms/F14MlSWMygby7QzjPItrmt19NS04H89dOJXpBvtb4aSYJq0k3nPXgNCiK8jsnPgbjWfsI2h

wc2rlBfCMpvz1Cfu5yqGFtJta6AN7RAWX5VEaaajBw
6YNw79/jbOyVkibODEgLAZaw6DNgh7nX2M4UlH/sHILEkXP49byI+jIyf46gIDAzjwPKl84AZYcPtLxL

SE/huVeDNpDXSzzDDXrvq0+Y+oJc59CmPBAiKRFN74/ithdJVjbJpswnp4XReE1BZUrtCMwtPql6uBy9

SWd6BcAwGFR6+hhl48HhnMDsoKcyy2GUMizcY8Tul5
1zw6aisVfJ+pfNzm10+MWf3pYrsoA1AzLGOSd42rbGNoeEhLsFCgkdiJK4Oq50SXhLvXK2JKImm6qh7q

//qYdwIM2W57ucExiWF7Fvmd3Sc1VmPx94xMMmKZy5HuG7oIJeI/sVxWzOi/zCOVZzodJbG/eZBBdtN5

egh6DWyc8+1WceK7AOKDYvxfMhk3KzkL9AJG9BGrCF
S4eIBg5dwXTNa9Tp8c75NhlPAsHzRO7d/DHsdxlYfk/1wz0JMMWR4wxpTu4DKx+j9xUPdyPsas3m9u/u

V5W86k3SzvF8APl7M3AevdcDgX+98Xnv+fRMjRBPM+758Dek+ewsD2Wr3h6NEsRratvzUZYR7xxCHb5S

K5Ptu6+rg3kupAKKy3sqKD/xcG/a/4r/j/OhCOuciI
XOGZOpqqAnSS3sne+MZItrYuwa0XsYR/Skdo1nWM1X8AUHfV6tpWKWf2UK5JOB2uxEu2AxMxRevj/sjz

yA8dgey16PiRGHmJanl0FW6TpVHh4KAcBP9iArAbmtKGp1vZkbCJ9MHDT63hpFYcrbgmgKluvInX5GLL

zxYM8uhEwSnkXXbmpmQ87QowG4jlM/CLkKARzX4aot
lhK8M7B6A+86wV5Yd976mL4MSGxqq9uEPMQu4JPhysGCxbNhrpWDSOh5i7NRhIkYloRAXSxMn3Kio4+J

s08AZe1lxPETRIKR1dJYXW2Msv5wjHPsdFZsvzjBThMYJnL11UfvOgP2z+hBt+qv0+hRU0J0QfR/1Ui6

fPV6eGlly1RCFb5A7OOpl/7A4LSUcA7tvG6RKG+Zg2
0XNBZMNIaEFyoZEbJ5cl9UUjVM+z78sLs78vUvL7/vOn5peJxoh61OI+d1OcC8bHDgTKg0aUZgSkldUG

cls+jvAl2GGewOKU5kPuy/R3+l8ceAubzYzkicF7oJrO39OtXguEIDH6JgFRomE8ZO6UUXxJ7rIGl13W

/FK7LFFSag39+ASb9HMMEvLz9SnRaS2TZ/DKci8Enm
qfmcenlE9Vg6H5sN2xni6fNzN1pDXDFxO6HNYYzz+bcl4uak3Yhqp384EK46jUdP4Nr014RDmCbjInMK

dmWNgsV4hT2ynqxil5fBm/OegpOAhut1lw+cp32Bg5d5JzGoS68M9lMN0dJLDhu7hZimfEmXJS/xYn5m

Bhph3pjQhcnOWcvFi1dx87N98iW6N2wPvcPmFusF4o
0fMbCTCersVke/GMBRXdEZmANCk3PGc8oSDWw18U4ZngGYa8bTpi3Qh+xbdpdR+oxWSB1pFxnrbM6DjN

z9U7Sqb4ikT8rD9cJ3k+nANuK1P061Ji+7E1Lq0U/fz8MaF79gqa/1MmDaqPvsPLuTPJGz4XSk4MM/ay

cJJ7gYO8uZ1lfgr6QaNyHjy2re3NBqJMXilmnzehNM
veTDxzSZbEAq1Bsd/oMsZb7lw9qh6KJ/Qlzm9vMVONzIAyfldhvyeJ61Yk8dAWalQ36okPSE8Y2cVP8l

I/Mj0NchZQsG800eYVNFwkjAIED2JlF6HkOdeOb6BvLWcfx0jjFkTkzTzpHt3GyPSdY3GN0j7uDanzO7

VE6P7Yxi1C5wAi/T8o0IveR6BgiBYd6nIml8nW2CCE
j6jV8GINHRq8OqSajDTnQbqdN3IMq3J4VlV4uUQTDPg+mWwztYkF+52MUKzWi4VszG5/ESTr1mGG5l3A

L7jmVCYTDRpFnNykVT8Avd50YVrza99AW0Rf7jviuumKneBLstW9aJsD12T3LkMg5by1ccGwSaKjb33s

myWZTNfZIx7sCf5sFM7E26e6yGaWfhrzIwbw/Z4XHx
9ndz7hleYy5hUk/sJqO2MwQkSkO20c2TjCMuR57Whv+Lst5IKFLulyf57QooIMP1NrXpqfqsID88UKJ7

al+0GgaJwqI9UyCPAK3Jg+PlVKoTBlkVJdhyh9dI1oaQyHJro9f1Z9AqtQ3UnWnbBopkAkLhJ9o/1b1T

26ud4C6e6Uo1ntfCrJcHj/63RgEvPzxcZlolGbNHxg
zT86MA7dsOD5n9MOrtfcua0guaP1TTkUycvyauvz7Te6uVySh30vFVbfvkFek5T4M5nIZi9eLqyuz7yF

m6vor4SkSXPsvCMyDAttEC6xNnvgtHVKMdX/fFofnGZ7/EqDPvRKPK++phMtxUjlzx1NWUvGjNMDvXqo

bMaX7nucgpR8rtmQW4Xcn7/2603xzwM6iuLHLyZ1q9
fZjkV1OSnVlUUE+ozIXhp2RpsDwdPoqXpAJM9g3mINwBGFwP6le7CaXv7v0oRg89gzID7+CPoIysHpO5

LCx7vkpldt17FOOgtsVdR5IyMRDa7Zjo67q8kbk7Quge+gKmn7WSkLqA5NtJfDH1KUKsV57xEl4b566p

BE0dxRulfVGfztOaALnvaN7nR/6gtlMP62XQ2pXbOr
7Fyx/q3QA+n+AtU867jRDtBIPt2/3+mg/d2cFBqqK26P4Ztoq3L5+AmWioApnHhLbo6aiZ8U8TgOvarM

fAchOgkYIMitoUBNf6sBfCmkQ3zhPGRlU8f099DzJ/Bqv8KVVEl9let06khM4GCI4XlpTbTSUxNO+4RD

pBBQHuw5J1oIdgSMP+n51OifWKpx9BWooZubEabKdA
lO8qtJmnr4uQfflzr01fQIIqU+2qFv/fdJUz14MXkAxfSCszDlNHvHHBXhIa6Tu7vs0s+5yxAVLgJJRu

9BJO0xemh69sw6jkGXBCT2IBgtIuNYt68yX8imkMGRerEN6krTFjxUTr95ukdvrU5IDE6unutm3jnaRy

Z6KdumsxnKqmA9c+PH7uuh5oFkUAjvamZMCQAZ5ENe
ea/78sgC9k5j405MEjex5wfDfyrZ94G8VhqXvtTZUoeyK/xdMTXK4XSjPEOD0wfuh8H44w/9R48p9tUL

Nk7oMiAzfcmrI1uiDl9pV1N+r2iCJfvh68BdDys/+oDtY5ksNfGV2jwJjaWN5t/ge7zte7Kv/eE7TIJh

XNB98qs95mPq6E6tiYSCrn+BjPoT9JtBLVfb+xSIg2
2SgbdL2VopfJl0+932ja+8i8lrNsrA602K06HjQBQhIW/ncENsjNozHx6BM0Ox00Qt+wKQd7NAgv+QOp

MvAYVLM3Q37wX8IgV+5StbCOFepFzf4JgcqeylR/1ES17vVCol9QJENBEEKFlF0KX70kXZ/6zVUkfcw+

wrfmHd2YXxvCR9Um82Z4Z3uG5roE8XqHFTyGbUNbVS
mj0TKrqoZmJ169LLnqgE2JPHZHX173odH0C4X/yz2L1wtRiYB3vNGM0POeHTY37XDjIUvnbQu8R2Hk1U

4wSfttaPDXU5kO7hwQj0uhHd8RPOtfSvhVZDSVGnhOimy07MGvzH1IOloGLWa2PaFZSGq+B8Zt0F7Y3E

/Hqg17DWLP70+4U6gTvm+75LOrj9TvhvsPZW4REvMe
8vZx/jX58eQbbKuYsabBicg0wPilUcgRtU2vPCzsN0v4WbD/73z2Usu9T57vD2Hq1DFD+79j7u+QWdN0

OhC37kiI7wmD+EeJUu6o6LAQlM/Ru1WWWosFHn3s3vavUOs4k8/oM/i/+Bv6+3AOR+1gmhzhWZFTa/1o

s9yxqMCjvL/8EPi8h0GcjkqI0xXbSKOX63z9ks0nVp
9ktjbF17iGOnRwXcFSabsBVDG2seaRRumXLQCPfX7q8rcyjevX8vpmzK3O634dLHUSGLnwvvtAVwPIc+

eCJyL610yjm3S6yxrwkRZz84RsymJAC9RJBcLzjmgdCgQ9rk1bOOj6kpgJksDVguzCVCfAls74z8o31U

4gnnAG8CZnJRnY6+BjJaZRQd9qHib7Ol74yiY4P8+L
ScX0kNqXvyMcr2W/hDfpHR+2TAwReklONUbA2sH9pc6tm4Lp80/aJK6xo/eRc1HIjmurYMTsi8YevR2F

NF4KxmPTnYKL5RtMKL82MJ1V5g0gw/F1YOI491baLr1lu2WaJj36QQ/LbDiEwf5aQc+WkXha3Vc+ShWu

PrGlGs2gUuoh6mLPbh4x2X5xK3xl+MGLyQmYZ/bD/S
y5qaPkykBxRXlu6EbUHDbiEecnvW51vpVcn3Q2GzDSIQO16Otoj5y8hdnXpQnHu6oplWd8JfuA75CJ5a

8SHs2U4+YpGm8T4YgWHPuNj85C8LE0MkksH5lmt34o2WRYETqlB1iciEMEFoExIVpQ78eJc3AlKaA1NV

rRJFffp2TXiL2qI5rMJJ+a3AZTxftvsPekI7Eqpw1l
rKfWTxVdgNJK5sTr+vXceoDNOFDuZi+41X0CRFiBYWz+jkWwD+THQEiWO49V2Fe3q896Ij9hhnReu0Xa

FuzJZz3t0ce1HLt31bWvcjakuJP06sG/DNx/aVG/VcM4bQb1U6T86sto4EwVCxqQwhlydvw96MAc86+l

/3P0QDMrcf9s1y3eJVfSsFJgjyOby1o+qE4Y6Mg07M
s+Ls7pDgihl0nnCUruLEbH8hXUlAAeeU9j8A95KFPsiyLkUAAYym4dcUnwTt2z2fOlZ/x36m9d506LNa

jajxkFvd0gFADe40c9MAfAK1pHII+N5lbADflxEI49CPmlBnWJg9RWyVJc1PtmpPOw94dSH0SUMuxBQg

UfPkELPg/Bon3XQaw+0th3thRoHCGAVtxz6h250Ces
9dWawFK4pNXfWUvobXztaXaXeW5iWVctPlz7RpNcvdJejghdARw04HZh0lwWTqlSHzb+qz2O9lb2ZzdJ

LVxW8mTZuzmCyaKuIBClSv4+h1kRTf31HQ3UJlmyiQH6cFkPgBuBG/J0D8vZ3VrvosQTVVhN3+t9JSSp

QHzbzbxffaJxtbYipDfoNVsaa7Q1sdOjLrYL/NVScf
ijogHf8KRoE77ay//MssDs7WCsaJFJz9LrjktS/3K9DFVTaJmjf5QvUSayhldtQmzEO1Cl0feqcy1GKv

VbOte72Mj4zGN1adK527bpLU88W3T180QSVAHgLZniwmYSi/n+rcxWQF2NgI2YaFAYo6O+xkscKgb5uy

sS2CjUAM0W8tNWitj//JJ3n/Z1x5FbvgxW6V/K/E/0
r8r8T/Svz/IVLTuNH1f/QTRCSjNePNVJHDgRvCgcDsv+3A/k6Rz7ipEwlo+OJxB7jILvOPV4qBzbKGdf

ySjx8+R3JOPJlaRnC04nDJRQU6u5cYbypuqc1SxRRMSwrTa41IQi1SjJYZ9a7ZoFHDbHhhrpNnN1DIsP

sSbJKS2YzvIuOX+q3tCCh4IJ9ph8D2hBX19LdFaj4u
z2A7mVsJffmJYAq8vbwpJ4bks/u1WQKkzh7gk39AiQFOcGlt/67Rsl3FwxQWy1sv4imDTtcL9mnRxluL

082SlQybbCIIpBUxGj+kmPi+LgSPo3he9p4g8MF4IItWBTI6OwE9OypJiQ+h9Y9s15gjvwFtoQmcS453

EPOx4YRWMJuONRq4e/+548fZcdOfkIUPlhzhSgnrzn
rjrNfDZRHRM01t7KWE5vWMvPwVH6bJ4tSvMsGwjpafsnQ7e/euROE906S3uxSVwDHyjm9fzc+pMAD5Lr

D12hRfR1/eOGRYwkjl2LTJxEFcW0IsN9XIh8vBR2nTb74/CC4Cc9itHdRNNtOvDqkBhvDnxkBDR5ELse

2Cd6PQpZKrOBCdgCD4T59Z3NhUya/RiykFNMfVUKfQ
FZsv7E2Kfr3wm0dISpGkkic+4eO17PVOnA784pY44zq4yXqWSkOVtVEcs0UhD6n1ulhlK1Sx9Le1/DuO

dgR831wZyqhfUMZr43oiriLRmYSzSo63LCTxkjANZqfuzOlRN1f64IBt9A0NRshqEcn58v+7qX5nVnHb

nU89ZiLNyDB8Lg91KQXUBq0SKMFWg1Ufq3C1kpvUB0
WuozByXRT8j85Wc0tP+H9+xK6cIupyHRRj7GJpWaiCd7iN/uStG4PN+m5EPFD/FT+lzZr7633SaK7e8V

aFo8+uSWR6HHhs8WTLvnTwNX7r7VxjpmIly9yMidcyAQY2ZirY/2Z+IBsDRGemRlycIjb70xm+SrHfH7

B9j3slgnKXnQliassQFC09cvqmg00Z7PhT433lIw8E
4/bV0ko+76w3eIrgVFoBAaSOCuz5hvSZDEsVofKU0BnZbUylTe0/3LuUKpv0YQnmMQFQk2U6tZkuvxBG

vaT0Ji2XvL0UoMSQXbjCQmX9cDUYkc6Vc1vrFKmxNATJtxCJHCc3YQUdHmqpD9A2tqM9me5IhnreJy9q

zy8QPPifUUn9TmjxzTczphwfuvI+MDFf1TA3XdKxHX
xfsPHZYLaZhH1MYmGV6peQgdMVCqVht4nuQvZHp53FraOBf8dcNmyi32PYRm+q/hbvDqpc0FlYikKWon

YpU0RHoZNV6wsGU4sRqezMt3E8NH9XvWcRn1+VHx6nOl29xnLGC2vYcXzea26Okw5qhWmJNwfyYl7b1X

XWWxvi9/keFy+EW0XNIjgRgqEk0ljf2uqvvmNqKKkX
FBSioBkp0Wi5Yei4PRO2TvD1dwYsfjDF0Rl+Y2V/Jc39D5XtQ8h5w/WmyMDTYeRnD2jD3ULRSjZ5fPhy

8EVOl9O/DNDPI6KHuZiFskG0i0DonWMXBgBtFnU9a7zzeapOdl4fnnDsydiPJsFpY4SDZ+k6w4exUnSS

zZueWo7Hh0ztThvHYvldamLe9jtlczcJU+oMBDLukG
8gjKXWcQE485iSgzZtydUR9S90p4tegQKNcGgkkEnK+UQ3luH5MiNKA5vdmsUqBb8/29QR0Veg5ogS8A

O4euZAk5Waq/aexY4CSfMb6/oBzliyIWH/DqqvZtoguPpwzCQYP8M49PL8DK7Im/tUNqhsWm5oC8ddX7

jXxw+g46EonBUguXjWF1gKcXFHROB2l8/UUW/9YRvW
3pYuNE5zfi23lAbaUBDkG8dY1i0wt65W8LRFsuPzRGRanXgYUECwePOGkhySINrdXm7L2AgyBp2kinwA

aoZVxp6ksag5QxrNE4Udc1dbfomiETLavHkDcqh288XhHgFhPYpDKpXwhQ18TnSLLRdrPuGba7PFXPV/

9C6Z9AfLhLQM/+1w49KEJSpbRb1bZqEB09trgUw9iT
HVADJ313yAYLTg+GjsJ87QNEuYTFP83fk0KOSXSRDlzoj6FrvgdQroASbYW6fcrDCZ28wg7Ng7MJ4p+E

3T/WYVFLyus0uPE35MZz3wuttpW59ND5iD6xQODHWsPQ1bReRMOb/G5BzLiOiwjLXGCc9o+v0c5zDU/d

eNGn+5WSktMQaK18+4CB/4G/QL6r0R3mzHM7qhRtky
8B5OTC/WxkvrJv8CcwhMYG9ZE7q75reFrcfo36B0mI1VEMuoHJSz2pvk9kDFk0WCu2FjAUD5He7hA8OP

fsWHDHsq6TB0DHmilNJ4qMJS/J/sSylDKmo8gPKjUsr6jgy8v38ubvHGXGsOzT/y6q84Os+Mm0ZxvxHR

rx0Ye80P5xCdSrrX7z5Y2dB08mrBNARxca2QRsrNB1
3FSUNab9TeqCZw2jftE/866U7TbeyF78c9SUt8+j8Far3+QaR0Ktjlkc9N/xRd2LxdEM+7IpdifF04hJ

RysuaWjs6WyzcXgBq0zBc0g6n//aad44wSN5Gm52szofHCfFSSfwqIj6JiXdFY7s1F4uVImMXv7S3x9s

yakP1cqQCmlkr24D6mOakfXiMRAhkQsEVmEvwhjTk2
n1tRNIWLODwQWbwj40ZfYlycqjfvbuPQZDUMv/sgtSsXQ21a62iME6GY+h/MfGEmb1HV/c8jPP/bn5ac

HoTfoXi4VYzC+iQOZSktXqN4giUCnN7dlZyCqIkJCFNbdl6Y0wjF3kCEKhonxjavvnWMG8/1iiASHNlS

+YOu/jqlfDgItGkrn6Lu28e0WkwiWlVo7k2RjrbxZ0
OU26IypFksnHEoK1+CMA+5mYLc3yf21qCxIDyLD5qOQohe+tViTFWbtTXXtU0Xywr8JlXGUoczcEfd4n

neJ47no4c0v8Ot6lbrE9XxlatkxHI5IGfPy3/+WEvtzibaMLIz56311f685l1Mf4PtfRscOT6zHeVUFu

GBxVhrObMNz8vJbPsPzJOt7NTxTXV/qpmERvd0HeNm
/ji1Frwfq1wOxtCi14Hq3VloXp92tDK+7/g9uBO0gQN2R5ryT5a+LZYblBWD7m8zGV7Ipzu8mG7Cfj3R

04YfA3OVnPnW1ELneDfETC9m60Kc72XoA8OaMyEfwvSzSb46HKYIO8TZjlzCodMjrBWmbzGOS8h5orYx

oSqX5FSnKz14NNffjDdYm9AZbd2fUXHvFz80akGN7f
JZK1aepDE+d6buC1iH75TlY9yk4VpTUbq1PQKmLpkW7cLpsf7kCG3o9ct/kYfI/racGIaWuAn7hV0TLO

2OVEPa3KoU0qo5TqNNFPSwNtBEkO5tmHOnrAQJDQYZ3cES6/Gagd2BNA3nJQ3/P5DRGlIPqEpsvOytqi

PYly7Sfp8OE8x0bkrS6C7KHaOGc958RGp6GBc+gZuH
Ts6MctGeQ51V5PvoietuaagJW1MF/V57jbjU+wXSY7sE4o04anAEH8I4NofCWjVo2662dC0XWP/Iqhee

EJXkqbiRoEc/x3t8ZgaP6hnbgG3mx++rehn6aimTza5Uq9yKPyH7kweILkNz3UFB7By7apX3pF3ELrvA

WITUs195Xya1AvMD9YO30RnejVxkA4FX4yXWEfzEKr
/w3qvCvCUgwRW7GMqIDgcITWOwGIqS3oOwKhVXds2D1yflbNY1Plern++m5xdG7V48xSqX5s9bTEgal/

dw890ScsRi99xE7/RnyBTAN+ukJmHw9k5uABstwlJ/cn/PRLSduSqLASGK9WIJ9PbPdfgV57yXT9Ilbf

+hNT6SUah7s8Ls0NxORMlTvufO7CBv/gDPhivIwU6N
DhCTTaGJJcKaZOb8vM61xMLLqRVBrvCzWb5GwaGJTH8jOFF9MaNKx0H+Bla0LQ579a9tgMVs1gYzN+AN

5VtwNfBdzfsay9e/HsJUHraKaRZZqxeCl/JVgnF6c4n3squPeJ56Xiz2Jj9v8a2bVDJxrplc5pWoK1kf

LdOxUjkGC9p8Q/uwyCWs0OTMCtkSj0ZXQ6kq+vKZK3
Eq6vFFR/0jOsSKaz4c9iz3VhPuEkaQbI7czUQUVdROYFdSUhc/g33mJGK6YLObR8kDAUdal3qUx5pZm9

K3b34sdTdJcueYW4I/2HUNVQL0jigDbgYWGIckvVuerNBAEeOc6jpnX001/YSlYf71fu5hjAfyYkzho6

skNe4n2xqFNBEnyao6VRLSLlHvpT1OitrSpqWxkdnk
fNJSzZyXr9pId5vX1EIomdKPPOmuS9RK4XwmNlcmIwqz6qGn4c2QO+oL5vK8v9qfHgMTcQDDH1P1MZMm

nCQCyj476S+/imFoAj5VCxk/c+KvihY6F6//i5q5cqUbtQ9tpHP1yIwhpu6+5MfwVcsxI1EVG+8xbzIk

Syxr3AfozV8vyuyfIv1PvcaLQGMy31aendFBaNS1Qx
mRtnDwBUBh9p/v5JWtCx+4Um7x/Elaine/NxL6HImTiJBHozfc3kMpMZzNsRpQNKHn1DpJv3gH4UnFFc2s

Y+nEiJmIz26ytgA4uSSn54YiihoXnijZAbWYnt+JLYrJUN9VK3QrNMe19+mvLJwS47vaj5nvkNOvVNlS

XFIDIWAuBcv1X/y806LGVGaYqPb3OzBXivd8PZogAd
59JSfFxKOLoUdCyjF+hMby8jkM9cVsxqgciKsLGFHzjmqGOFnDeaGoq9Go3zBT0fiS6VvQBBnu+cvWNs

GWWfUob/5SEWmNflAEotAwTZxL2QUoqXQ031uO3txsNUNYhj7GzaYbDavddoUOP8reIcRncs7MXsbjcm

/PQ9pNO4fh7WKPuvbM1z4u8zUx4+nomYmA+0VyboM4
c5L/dX8ITL+WL1Dr2R9DFmw4oL5VNzcVrO+rxr1wBiYOmTKCdcC+RY3w0cR0EYuMdQEKvIHCnshIs8Rh

L4sjqBp28CvQAUHqlVFDOwRaDLrmHhPQceqQnjV+0JsWn6o6FflvNa+M6A0NTtoGYY58C9O5duRv288b

SAazfieEUBEtPfo9ntgUhx6RbGR1WjgtNKb0ymLtSR
jPvihXU006C/nHhjNLb9hPfKzlIA7AN4nLJjqeIAYmkKAB49qrqHVGoagjSRp+YBYHdCcvFVJe32Fr6O

n8HGIgdvHtld99KdJ2kUCllg8nYQtDKBT4vHsprjWNfCNo5nz/cya+cX85MWqNOyAzH6IYaceAJsG5pD

zlzuN5j1XKq2QJstikr8LR0j4pvSuePOXANaPXFnJJ
FrwHsRPEifNKzQjQZTBwp5a4gYfShzWuR7XOLw0XQH8yP226EIDptiM+lttQDOWpx0LRKx/tsCD0wU3Y

Du6BV4F2yZqycBk8D/zqeQSBL6yURiBcM3ehCesPI4q2w8fGl7cXRby1nJ0NM0D7BSuwZTcil2R/tFOX

yOGPElzzkPpBeuEetR9yQ1VHu9RQjVFzWuMvtuYRn2
x77Dw8Wt7+75tbuURL3IeWwQfMlOn+2rs5Rq2s9lL+W8ie2jTNuCOtdjKnXzXG18UbATFxwPYkZ2V7Ll

fWGLV/Qkc9QgV+SDCqo7hV4OwVmC9GuyKdW7smMlm8QG+j/i+iWGPXn/lcfnljryFX80YDtnPUgRUYoX

mM5LA7z+O77xZYfaRkwbTSxMQbUk6vZGZrr3JK7zQf
ZAcFaSbVzUd2S1Gvm5J9VbYx92PZiElvG6gQcQx4NMk9gfGm5uskcqcy198WgisLLEutF1zcvqoiWEtF

KM1XlNKmrq3WQcUxZ3U1dFqAg67+GwHu2sDsIxn4arqAxlY8NKhMehWhUQursya+TmMWr5w+1o++3qSP

ADbs3S5/nsJMbZbQoV6ptTMjU+IAxFjcS8SHDJNP+G
X3aUKoCBtLqHsX/Jh8psHOr6LhFqE0E2i7aDviNIjw0q3tqvzTxh9cFXl2wWw4QhhAriDZaP6Jvkek0Y

ni96HDw3ZOf47JQtd78/oKvAIRkZdz2Zzm5NJZlKptB8XPQkV1Pow3XPqSmryJABvVZVD6rj5x1VedYl

kXckZroG8Q/DJGEiA/oINGueN5jou4R+1CJV2AAYuZ
sbD8zHge58SqsWtDsUz0CnMIAIdawOipm2oFi6YJvpiEOodnK2tCZ/JQHXd954FZCXWNo6PTkUEhHXDq

DFJ5rvs7KPl4JWquwoTpKFXvzpiXZ5curiL7ixVYV90Qe1WWqIRJP7iYF/aVJXJd8xDeQ7Q3mp4v9Lwi

U2jG04qGmOnaFDABy6n9n3Ulk6A/VgYRdlshlNrmhE
3sxBYvF5J35vSWuXArok241q9W2T8jxz26V0dsScYLSFLNglsatKGb1K33qrpZUCTaNwWQJCMEZ9sMBs

QnAWZ7KW2xQ4L/srMKitIzpbePJ/dU/wUXuEkbh/UA492aXlb07tLwg8oYxvY/xte6yIzoNpVBsvMUUl

xoCvIMXV/xh0Bgnq49p9lMs3P05YvNMe6YS/8yvIZJ
OTzYZSjpB2kJmV4UjlW1sQHsABJiX4BE8zVc7d9N24EpAiQY77dHn2v1VZGOlacLaUIIrcRQftWJ6mSV

bFUe/SL1hU/PM+j7uXBlTBGv3z+/jdJAH6PSye8JnalpzZ6Wpr0aN7HnsBm8kZcXKKoTT0pVu0cj+BpV

sRIjYHvPk0LVEJmn+VC7RnE18/9TP8xXFkGMOLVYb5
rgY0mCUPZcokQZZk6oyI1l/c1GnarmcpG1INuq2gVxu/yX6HLRjdgvkqNKaJ3c2yEm4fOA0uiJos6S+2

rWtYApZh7cBEx0xHIVnT8wM53xoy4Dr9tEDsUVq60f9gB8JU3feLcmOhUddOUtTIpLiXdGtnG1MvI3q8

MkQ/P90NfttG7Ir0vvJx7AiHiyApJXmCrrdTn563PJ
+8JxeUmakiBd+87SBi8ueQGmxfPyKuyYWVznPXkO0aLb2zOYGoVGdryBNIP8ZfpMMGWyvpNO8XpmKV3w

Wvu5SPB7qb5O0giGgfvwlVU4rx6rsLoe+5+OyA4d9TIPEAcG0VfkoB0uZ/ImyME51kNjJN70SF0s88+y

gXMK9hKB6+Y4gXz/je2945qbyXS29n5VcRa0u6voj0
LZVZeoM8L7VNVZSSwPvbgAh/m/Duf8Qy83EAEzhsBd1CkP8DwcUooG3Ry5+I0C11p66eXjx9leBm13Cb

Sf90UONpVQ11XOSTJWVhJ1/pEFQWCGEapw/gpy6Kdbxd8tRGugNSmToRLhST+fQq9PXf1wmztkHL2UV5

g07QUX9imdPbHIoF2vlBCpYf3c/LDYRdd1KtBlQHKE
i5nrzmgOaL9bnXkv52Cc3Sddu0zX6wBe3+qzMHDLOPcdLJ9PpgAMm9Gy2g/Cl4vcQjTMOiPGI4Cq/7t0

ff8EK1IhD0IbHumJnRE13suPn9ADobDkXgDPuyB4CoPkahcxvKgk/4U90vt79d9zkt4gF1PNaqK3Xroh

aFb2dPAk0IUWpiHoqGh+2Fh4lDHiDm84lwi5LP92mE
/zGyHBxljN5hUaOygWKhowGPXdwCV0NeH7E55qP9AyS+gwypwUQ6UqXY1t/6HTFiEtKTOLhdImpvMAmt

vqOG157ADbGffzyKq44wGROEaRPHxJQQrKY+hu995EOhvbFFSOkPC55oi3pfk9pUuD3t0an7qjOPaRU7

Bfg/O5kdK+eYbqkKqHB97u16Jyi5nttCdA6ag8QgwB
q7m6x7UmQbSykBmL7k9leI1bQI5kpHA2Fnv5M03tSQ+SEinD4CV4C2mkO8ChRG2N+z1HURWGZSLfMBw0

vJCFywwgzrE+MHdhT78ttwkWOrAte3hs7swdkLtxWaRiuge0CdC+djcWyk2B/pxuklBKr+htI9qI6Irj

ssGige8VjSWJznEsNzaeQpweHMzAOx/EzovS19/skH
z8le7J/oQfG0x79zjipDaHmm9rycLq7nFQLUU/cqJzuDSA0Aa3aNChMCNDUZ5EazQgVpchjjgJzM3g3k

BfYyR+IHK6QWdR6wLwkkI93sVHDxSETVEAtw0h0ZhxmzlQtr27+G50M1l/wExW57h6Sf6vp/TJjjs2Xc

h8+7rszDGTLXqwKBZ/zR3OBqGNxEj/eHxmTit9wkuz
MmZpi7m5PtB8cUtyGIAk+gnrDlMri2mx81Y571fZRl885T1RiQemK2+1TVB/B6HG5t3s58N2KYy8lSua

AKCMH+VxJ60kOZiaORKBgN1vZsAV41RsV6S/izLFS+aTBCcooTnlC8dNCpNipZBfkjDI+6y3lz4hZCiE

T0FoIbGlKst/Nvc2Klr+/MSk0NfIZmrtbuZByPu2br
DZpIoYlfqRvx9tQG7oq44SquyuaVn4jz/dvS0kpcQ7W8+EPs8hEFonWGNnj5K46TsQg9difONsMJRxA7

Kjo79B6keZ0/Cn4Cp94hc1PL0hnFOD+XMsgDFUqaKY65qPMApPIC/mIkW6KCn6AmX82dtRaVR1Il7118

anEHEBDx6cRRrM8B/Org7Nmm3coJtuA1s3lx+qTekk
+FDB+ivQ/o6m5IdhihSnOG13h14d8VMht/NXfMVAb/iVX37HdI6VrdVejSPemYC52iHu64qhzpqvSiPR

zu9JKl3vzQW/u62fZ5pPH6DY3zDlaSAaCtEopi/RYwxF/ezLl7BtK1BJJfmP2lIgvtTGx3zlLhruwUNc

FXbqIiCkge8J7FeekAX+in6d3UsBUSBK/Ozhx3uSvY
uO0NBZI8mPvYHWy6oQMouTnyb9TuZVqJRdqx4f4CqXjAYIr4iGv8ne53WjPIaeWu3hRrRkePuRkHLYhW

M39PI8MW0PkTAo7wRS4pxoezDkiq/HLw5hnn4EvLQrd3AHB2M59p7ix+Lm3oM6age/6PjfXqP7HVIpFA

gLxYuDBnzfO9RRSY2e7Uknaa5b80/UwiMzbd9aqK/Z
woAwgra4Q8wfahLdQxlMC+kfDJ9aH72Os5w3cMf3zWs/MMDFULC1mrNzn5wdsZYn9eZ2FBeCMX3obGKb

QXu0GRwoxdodpoWDhdWvFJEOWj4b2ZGgPPLY86bx/TQDobBZNd9/aaZPE793vgYNKBiHi7N/KoLApzsh

2vRfiT99i9fc57Bg9/nC0hS4GSUfjfv/Nsl4oG5v93
tNPkFZNiLFU+vvD+zrIHJzfpkG2JuP7LHxd1gd7CKgEAjv06iCUU+bqc5b1skzR/fRyWwBPx9G5Y1ctp

RnzciHrPW0gUDo0hPv5RQOD+SP97O/9iu8Ji9kZrMzJ2fJQfPGQUuOID5HD/zKAxt9V+u/aPEB/63T00

jppVkvI1j4ly9jqrOw7KiTqK+oWPLVnctyKCnkqrv6
K999smx0vH7UIADnNtwXUMlzilnlwAs5lzmJBxVcC///57SvWJ7AVY5r3tCbxT7RCFF9loZ5mS0ma+9T

5uPDDwZ7z+eZ1EophZq0BSTrpEcGSdfQe5PgTHkfZDOi8n6F4VyBpSSzq98cQoEN7ELj09086iD4ePlE

bOuIMi4VVofH+ypCfoRNU7NqehELgAEvZkK9Fy8Eje
yI7EVKZImr3apiec1tLUYLAHm7szo+RzeYfahiTuS1aCqmEFccRZgduWgSz3HTo/MC0Jp8KMu5+GHVyo

jPBrAy4XMTzx2Xr63GZYfQuiW9nTYrUudUFb6OaUr7KoBbAL0JuBnc/njvhY8+4mlRh/6OsH/yHtvdw6

T5Zu5CQk+u7TOvDknK0eNpd8XpcOhoh7D7jAKQmxxg
m9iFbNKQ5wJ6PL2PtZWMM7nobep7F7I5c2akVZhUUlON4aai0siwN2ZVov18tUnIC8hLUlrJkWHv1Ui/

O31yIm1U70pJzU0r0sz6uUgARDyKZo2qh93JnJubIWDX5V2HUM1fUZtM2rZCDvB1PGhDdr8kfMh+/0dg

zHaO9dtsdhVDn1FzMgZAda1UwJDmXw6FAafFGCXFUL
LR1gohWyY9RQfAjm+5MV5Ugkdi8MRUvMs2sM4olpAhNLpYhPn+6asM1Edgq7308oiT8u64W5oaNnVtVG

Atc+RGUqSnrIE7K7SwzA74eIZNPeRU6e68/Cowi5mWOHxTSxlqNsauOoUpbUjhfzwDKkPHjF8aUA5s87

asIonqjzYl5VEvWEtqcn9tsnj7jjhcTzPdGcaQoanF
o7Yenp4Pe9Vxy86aTLW0/X/7GdgxlxFmpY/CEgOqUWrkzzMj2X3RDQvzHDa9/Mz1bqZYt9Ed2m93Ptmr

zKDUGAl5RfDNSrTLB1/TqJnFkFF3QBNvlkbPiSf9dkmue/eqXz9NfE4FIQAdAdpxZB2yAF7CPmgZJmT7

Mh+O8cYolhL6A9bep7N03LOp3uluLGxpq/KOgmOmc2
3p5QfNpTbgAnv+FfvA/2awI+6JP28OtPlIxU1uQy59JMbEIk6WDyzDA9OtFhATZKGJvWPwqFtFbW6rWE

CuPVmz5wmau9mC/2P1s/1nXz5PsbaThIK7trJsSoSAwMh58818bnXXaP+iFS82R3/foww8nRcsu90uyz

1bBRDGlKkGR/cfSzxc3eBdb3/NA8TG16rZml/lPRVv
SyruVVEV/omVz+rQ4mmmSJOwSI0Jp6eQsQVAOfAPChO/Y4q4s5E6ehCJnF6t5uJSBYAg+B51g+7r3lzu

vmpoLeVohHDFDZwp4tVAHpwwfT/Olbxa4wpQQh2XKFzPGjqFv1EUgApEdgqWZbWR6w4d8gzws+oVtrzZ

tmzpsEVTQEWKo8G8t34UORGSg6bnlUKtA6Em5CMrla
AagaezMzR3uM0ZoJmhvS8/keUVcvnJzHFa7F3Q0/all08oTyZSv1/CGLx34/2AkTOjCCfVeoMk3NdEg7

4+eLRWjEvrBgQKsJTb44zMoZKE3GrR0h/U+jfLO4F59sLma3z7ml4WwQ/Fb3EmTk9VPhNePk+1EJSNm9

ENXwXISHpKj+RkmswzHg225+NsEtlOPSXsUWvy3j/K
ewlW9FTaFN33bSVU+DpVOEnIdoh+tzpMANkQ3JNsIVnx+9fhW8I/qpF+Fgo57T9jezWc1HexdkoaTtam

u4Cfau52TmfOqSiRDlXzQJ8AVVKtBExUEeM5MBa0tbv0I9HAzpQMBQMFgSm3P3kg7uG5WceiHDb13Kb5

1fBCtWbr/CXtdVfea/fcuo5JTQvUmAr32ijxq/iA1g
9fCM8pl1lUhkjSiH9yTc0lenMlRqmo0/+HelIqH+4q8jjRwJFzf1uZ/AfxI3odflxOnRmxuTGN8I6sTt

Qy7oxCr6BREnwxDZ+nHcm4vkSg0Qtmwv/F5bWuuHRL/AEjgHfH5i0EoEjD1Bgm0XhfoIFPbqIRrIsg08

lc+uNj0YJcecpftLEszjR85dPPzyIByDy6Y7cflIjf
B329ToyQsoxP5EN6SM7w7T4gz+YGpx4To4nabAVUdeubMGbZ9uqQROFfbGr2wssIpOFzcU+MvQcF961z

6gNYeAqXcl3qa4LDJK7jcJ0/QqkEw5YCRghAvez3gwrl/Uwjs8e/eGdZxjQmQuVVqCgFDVveddskymf6

HZ8y0MKWwY81GA6DsMDZMsfXrj94ibsbVgYBRtae3P
AN75yLCQoG7s+943ZIUWT+XH6HUvDc9tXcw3z2YscRLoGWoKRSux8k34CorPWYf++FXaq6F/IElLOxJq

76OlYrqYGNXf4DRaagL02dfsNFZD0BjDV5RC6K+N6HwQPtP4clcImAsCFJkviYX77k1iWEL4tg2Radrw

PVqjCsW8BYHi4CpevsO8Q+SBWMR6y+Cf2E0ni3T2qu
GRDA+4+la/zVzXbcVsaS0lQUcfFkxv+yqgh79fpgIGK6fgXbCM4HCz8lL4mZuX7McXSMLIiSMOOaPIoH

GPLZHWXnm6X0Eo34VMYvRwfjRlRVz+lXbH6hp4cMUfh9yeID8cI9Y7aOKh4jENVvSz2jDOrrmarzjf5c

oPLQJiVIT1leoDjMzzA+MUcNny5eNFMCbbKfU3Ify1
LnhK9kcPBMrITMxmkLQxZLk//0S819nqXoeyvUQxTWCzbVfo3lmz4zPIXZa1RmXrJwwCNGqGPlC8XsMi

eCNAPdEJ4pnW5IAiZmhb/c74z/1/Ls/3Xcx9M/dYY+7l9bqy08hn6iqYBfXlIDzaJTOmWCeJzQ/s1p1i

BBliY5OhjIip7EjuI3XQl9qFvaCNCBgA+JBWt6n22y
iejPNhBVR+Wup/iXcSUxW5KwvRF67wPC+oJJWQWO74jPSWGtngIZcxAewznFaTyWVO/xeCKnXhe83vZj

ED/oQqGuFnKOROqu7ZdL+vQ98oAbih7JNSr1xwsaDfLtHB5k78Zva79va4fVN6mOsHQcK3PL0zSYPmy6

EgKUanr88RP6ox2kmQejhQyxtfipC+LLyopivmyQcC
aMN8uO9L7U1aKldm1xZoMm3efqtxfVqWzF/zBLT/PQg+5i/O2NQLLrqdN/lUp31bqeftqW0Gsu8WRNfb

fg6ohYhSum4a4nTUySk/3vY+EMXrwNaqSrtZpDjYMyuYrnzcDE7ZChdvnaBKxMvuo0sUtVvbtA6FHZQN

1CuJxeAiP+hIX0ct9e/W7oCWW8WrCfCPEr1+Q04lGy
ai9eXXTQenmvOCttCH6sdHO9+VIGmpavPfPZ8WI3nuc/SFbVWo8znlEt6db4GSXHqxFSbjiuoxXl9nXa

wBthurWeKtjl/6Jlb/53+unosJc1E+skhSP7jMeics9hcQOEZOOWlwBUTRpA/PyRusU+F88vii99lJrh

301ZW9lho+usqvmq3s6jq/Ge/GsAnYYDWqNbzqOPnt
uiTRTKsiL5fl5fF5Jw4upOwAayb3uaa7Jq/FknFvOD3k1wGrIxhKPhhnsA3dkgMtaKt3p8OCYDuG13/2

jgDr6I/jLFHwpmNp2oxKOxhBdCRjMRRzMU7KNd00tcNSTaWgzT+W2bXpbu6fsSmkbvsUh3ERJkb2GX53

XdzD2D+nHnxAktrC1Uopj3YpQNTgqjzvgOUxwoN3Ip
9iNVzam9qYmwBM4JSM8sXswKiKg5bQ+l80IBn5/8yozW52YJ6NiHhvdRCv93LuYgdIS4mpCiVExxO/0D

DDkI73N7qgivhxUsi4zZ8nUCfeuo7kb3PN7BBW95M80v66u4m1kVtJ3vjI3UHT9ZvOhcgMTXZ3bui+v2

bwPHe9dsjAs31rH00bv2CZKdMmd7vp6Wj//lGIArmM
YPYbjG3Wn9iknqE6Fyz3PjfE8LVwVy7TTXQWlzf9UvG1aX2bYAAtrih26V1DAhWRUGcQIN5BYtL091++

7j5pCC+2MHcEl9embNJiivpxVjzvc3yjKd0WtoT7PES3OzX9YBuGhw10FYAG9A1iYsw2En+QICjRrrLV

1JlqKUHgRG4VKvhE15Gd7bn1LhGfdG43Hk78yy1Zvx
h/Yi4qOIefgawYy3VA6jFcUjXn24zAvF8HGkz2wHOOw6m+hrxtfSAApb+2+H4bbyHoAWTMXKimhQGp8P

/bu3Gh/yDNXa1bH7YHu8cw1tHQcS+Kc9PxesCFs+hJxQgvsM3eEO+s9o9oF6LRNCZeBs1hDov8pYlO4p

YTpqmDvPnGDNUUXeVa7/pg/G4dqkNnnPy2pgxrMhPC
j4hQI9oy3NQlJv40M3tHakf02fB3WiP9ujvdysHir+LvaGcVAXa2qqMTWBVA4NLt4QGSwImz3n9ek23+

BNL7M1U5xaNTEhukftvNMQmVkbIctnbpofCHQ+KPfxM8/JbBIK7YJRv7RMHa7aIxtWESNXnUnTY9R2CK

9vtYNBT3BMe3dW11n+lT3JqXqQJsmcKCwm1hNPTKd/
/6xNODD1kmAVX2RbkzkLGKWzqS5DpBjnaY5a7cRkjRlHj9hpTv4CwgVitFcceiS2aoY9g5KoRPc5bz51

m1VQkfxErLqFE0rZ2d80eKDeyq69U/zDKox/qquznjiKGZT2yGnCixJpMMWasd+5iz8u834ohcpYue/4

pKKNZLqE9cbcKyE1KK6Mj1Wx8Sx33iwaoifYfnlHCH
j3L2YEnA8shSMUIDOF1uEDpcidypwdmVnTZ1FdplxDt9ud09mhrfHhWo3ANkX46yJu05ruuOyQRpcr49

es2x6xZv8T9ZCg2DGH2xVqub0YMSal0m5dQzCrPkbM8cJqlBRf8d/aVtDAavQERommB6iy8K5UU1e+wz

EJdkAO+fMtj+Ad9D3se61tyV7M09wIj8ALpPZdEDwS
qcVaLVk2yc90FsoGdnVsJHLHZWhiL0lsgH72YKIYob0W4Nua4cNrT1crMO4bOAqeVwVK+doGfNDdmm1p

9d0SGsJ5YwKKBRVRf6FGiS5DOYV0on8S7CsyjnSV5VOxiRoq0VHWMf8tzlPylzlqY6ebopa/kN01sIKZ

0MSHENFhwCoF5esa3hz5+N8MqAw2Fpm6w322Q88eRc
JsAvWsyUHlStj5MX3SqtieXB9jiKK+dwDPPC36kP1LrE0LUV2Fn2hnb8uGKLiOgIRnWnfC0P2oB5snLp

DA0EMsLRSYv5YtmBgxlY1VByBcA1tgl8VQcZzHI+PbROuiUet6B2ZI+WOVLYYaR3hA2Utio4iyf+l0Nl

15Cfv8ZsCV9swU269xDI9EJPBJwPPecNoMdTAgwPmJ
A7FFWk8iHmYOBvg2lXKAByxonL3KEmuZFUd82gjKJIDyi8pcZqkA08BRSR09kyRbJu+FRHg696dUWfss

WEPcDyhVBWxhq6H2QnCfA+o5i8KfERJmXKjZd5ubTTIhZUPdoBfxaWXWKqILXhQoVz0MMjKNWxDhHHRk

Uj4LbqBfVyQEL6eEuobG4pD92AE60UNApP4b5hmtBq
GwhlImnnYg3Bz+tx3jVkuUmpOkSo8H4XCcjv9E9W251F2XntLECwtdnf4OjEACn49VyVqzSjd5B0NC6s

REVIuE12kGsN3mWzDijH4nfNm7KD4EkV5fJ/uMsuKXdXGxzeHXAsi8qtToIrcuKSo23VYChfHZKbxZJH

jpWr93IDNl3T2AlxVMm6ZDdDlmrFxSdryBuvFE6hMl
02zofT9SQ+EPgfoia9BiGh/S+8bkPfO1Csm7hQkHW8+WjIWZYxf4820Z3ea+k0Z9a0+2cS62m1uc3JZW

38iKbO9SVV11SNM+XjvCSIatEdA+dKhDM6wdHv29EPAictUO/IWhPVzGOFHnOEui7MdmEwRuYRUlPGQb

m8RYrT4itgNX5sVhP1eM2cO4K/MbWbjhdMuRFNmr2D
9ZsuQ6Pe6NQV25463e/u3vo+9av7KZX/U1R4KI///JZqiDMDfuN7R141sok6QPNdpPY1KiuEMNljmFoI

j93OmcuGkLXl/4a3tTE7cak/rUNr+FnIQK1+KPAc1Yvug1jsNkOVJspDA7hZ+Twyi5aGHGnylxlg0lhr

uD+mugrz/8bnBve+FQl1Iw1bBdb1OtWyoz5eJCFayu
uzdyZQvoxp2de/gKfvVo8IyU6VphhJaBGw7bVwo0ETtc9YQ89lYCsDEnub3FEwO1TqeXjEzopbFuY4L0

znrneY13VTQT84ICisGmviznatc0ZGPwwI1yD3VAogsLQwROCyc9Ftp9HV473ok9ywgPZ6Snwdb4dUNl

rQ4yPgkhd163icuOaedZmZE/ll/3k7n7JVlHd5LMYg
h6wtrDqrpSznU3vHVVLo8ieDSFPpreHrSKrpsbBfTGvSPYCdgqWmpWsvwPtwwmnMv63N04O28tNelXyz

67K4MxakjiKe1EI07oMfKSss6A5SzWzmdcjl6j41BaMA8Md33hfPd3NnsaNVA2tk+J/qyE6kOy35z6ee

19uBs1EdnVjL/Ow9+wkZwh1tQvBHDDNM4AvKeREyKu
Ze2aVHs/6Zz9fC03FC2Di9ECp3FaFD/wbw15j7NaAAVS0/gLJq/PS3/vBW6N/T1hPmvD4w2BcincfBsX

1LREpyK4Fu3zwbNebaEfxynX3+soGHfKLDIGQ5BgBxd+1InX+cf/FrTEDHToTyqoRwFSbrOV9/rbwqyN

NfOvLfSfAyWlrQt75BbBWkm+7/MFL/8Pbe9RH6niil
OurXcLzoFP0mEOQEm/6lL8TbQLXNeISCN2gyQt1zINP5Ii5eFV6C+aLWl9ruRd9f1QHzjlFMLuz1R4Fh

dCCTaNABBEuL8C/HwIIELa+D9TQBVjANAVKlzk5c5UcutGsu84wefH++522KX1L70yafV3XEV4iqMpuj

JvhDh4d0E/9htpDTr0zJMlA1z/vvAKR/61YoLmA/wd
XMZkNbW4RswlMbj1NA/y1knfByNWU/gD/2rsyzz+jkJk8cqXz1UEjKNBhcpVQfsAKdFyfJ4+VDcW+Gdn

C9SpWiimlOwN90GbHBjZBjJWWla83NQ8phHhwIPdpDOJ6F0CqwtMOEb6V+wx6h0uMEvxCrtYwrP2h1MX

QfKdeL1JLS8rVvUrGwzjfOPVLtdPP5UOCs1+xZYFzQ
NHZMy7R/yS6Sj3BytLkIHe56yTGrUq8y+ZmYzPV8SoqUjh4An9l8xIfee1e+w4P6/6ckvdcbiL2RGvTT

2zsUE0q7qKhiRXU/gPt1uuGj/hmB6OiaKovsZqfZ9W0n+HrPUaYySwkUfUO1qF2sltXJkjEqZavtkH+T

hwrGchkjiI8FBc+FsvXeu7jckg2rAqXSvCeu0IQIwR
WWqrPxKPTWYS2PMYyjhaH1XEblTf8Ml/w3oMh3ox4vI8HpyYyD4U2ZysN/DkA9CqwH6Bv/7APiSPkVUH

QyPwDFjtzUnSwjnA04K4Ygb6GHZu3M3FJLjcj4OuK8rIsN9LFo2fC3RDvnZTk5jvkvNrus6gzgqc3TQM

fNdy1bFzIA/37Qoju1v8R5BXjhvsla1ekjJsceFQaO
8yENIZV9HD80Eb/6bsl3iCeXKI49O5Lh6VdExvjl+hcudAXZFLUpM8qy6SPa+AlG6uMAlb8aMF57zIgW

bF4Zoto/Qs+mHC5cTBOOQQ3oh0NfmzEh5Ldq+sEOSzR8CtwOZ/mtxBViXK507RPC3p0OCJi5FP8sRSVe

Qetj1n1KjeF9L6GjW/2U1uJtSO60Jz6RfWRgic38E9
J7Lvf+lAayzukeyCsOy7tw5eSlVhTBunRrKaHw78FrTFPlBqJfm/Nbh0vXJWFCe9NqCkbnmfjK82cVl9

hi2uceZ+/nvp18amMo2y559E7MWkPl40Gd3Gm1kFlOnPmi6SMbDmmrHIYiz/MlttD14mIMiF8VAAZ5Tz

OUn4dn4xwSrgHXVgRhjNtlwczAzpof/ffrc5tBzQ7Y
t4BUBc1j570CPYIhx88Y6UVuC8UUlMiLUNK4VrFDcP0GystLHVfAOWNygDhuSApuqoYxwHKT0+zrac18

YZpwFts3e69iMPftSe8NiQdbb1TnbExCEVJPPtJ/uVhFPQU5xjn+ZfZ/YfrQ1GPcm6EeGcz5Vnxk6tRs

Trxd4MCxEeUTkYV7tqAGeuTg6Ub9L7Dd0gqmSrz/mv
PzD1d12jma5TeUvFezuNA3SR/yYQlRmmelcJ1Arazi1J+RubOAHRnFIS2inrSo5t5yxqENC/A9kbIZyA

Frqz46StJsnyfwahNmOC9vayvHFZXVVQoz/6zgOZ1Iupn6rHpAapnqfmOrfy+8PiOiLIuX/r+XFkhV+m

N8GY1u5rmb5hCig9E3IkpEKKYBy+3I6DR8oU6yBrsn
tuGxvkNe1CA94/s9Rwe4X3jsaJfzHb7lR62tKaUpP19CvXKq4m9+G4xMmBeuzej5aQNlDIHxCU1gZS0P

02KGwziKgI3HsW44FD66iMHkjvkgY1D0IXLoeScjQe30nnyEJkrYscxdPbyzY54+jTApmAexTCmqkFw6

jIhP4syU/v0GJAOYT1blgCEFzA02mQl7g3t92jiPyN
gDgl+tBFd8EK3wBwhPgO1mjnFzgLyspwaiju0POOxj/nlmiSvJyTm2NDz5ZEZxIl3iqSiFWO0ds7J2T3

YUi0FYPkMW0QGvnsu/UeuCskQxyBL5m8GySYK6BO6YXGOfN7A3RuI4+zXGUoqgBXDJ3OE45gyDF/y0DY

p/lf5RUbuquVYsXnf+2K/tvr9qZVC11xE3a+Z+YROb
FOKP5lqTxuX7iqhFM8Ywz3/uJPWKBwRjqh9wNHAYtBcHdgxOO02gWi5Dwu4baNqSjnSxuujR4J5Ml6Ud

N/nAO7hGTKRIGESbnEp5pYSYC5fqq7S3tDgnEsvDWa0N6arrFor5EE13FIdaxt+dqykAOkGXFKGf6aM4

iHMHSu+QdBJuk/tV+wGO5pZWfRqYPnnbKxRwl4EzHY
uH0gIbZXoFAgzdrVOBUPuDRegrjMYQaszANnvUsyIq1KSTkd/7Z/Ze6ezONkFTI2ypCqgqf845GswkON

AII9+orirph1VG5/9yDvTY0IzK0ERRxeGLpTlp5kxUidh3gkaXNIgn+1ocBFoAkLl+aaRpPfoEMV+ant

tpUeAC1CbUH1iABOxsOyCqviGsC8+H09D/k+JGKUTl
KpAjDADYKjnMSHqmhCj5MVfioBVPUCaLD1OQTX/dYWNulrNyWFTCYvF1YCkGJhsIDZDL/joZfLjQcwew

re0Rzhcyz8RDvmd07u+NSletUuDcUI0N+NhyYfj26ATsYdpboXcXyQZA0tNPBbccf/DmF7773B7C3EYH

0n1tDAvlt+mTD7+Gg92k9zdvnHsc06empqHwLkDwuI
iGo9aSAYdweaomreKcwnGvhr2ZP8yjvQlApb2bSx07YntCebrllggwW5p/qBakPmQSqRK+Vnnj18dC+d

OSts4ppdhHwm5+rmZuu5UthJYVl/UKn2o5UTPd665aSmBqr5r1N7BdFeFcZJlBgUJ6AcgxwGQk1rfhHo

RfcB+zb83j/w5oUB6j8hjrFXth2egif6pDPTTw+aEg
w9BhgTIqv2X+lNPBz7ZZdHIxvN0fUwlBX22Zth3/JcYfX+YuT16eHLwKyFinCbeh0GQPltz48UjO+ONz

kO3J5pZBeM0tudrLP5PwBOy2LxI2HxCi8Qsj/m3/yY+bizPh1BZD3qR3WHrS9Iroy2O8W3DZSjX71Sdv

A6fi8BaC7so63lIpH1yfpjzGbFOrieOELMZh+bX1cy
iA5wB7Bp6GlhV39EAwZG7jHgFzA1dZeZXnim3aYUOS5/M1yuVGpExyMBVPG6JV/rVlK/c2efIQgNK4uq

osPp6qFz9a1D+lorCnd9Rh0gdZMTzqXkyKhLFqKEr5PI2vbQwcbpnPaxph6g+yUz9S2maAOnhiiqJ7Cq

qbzi48HL4sutjhGlvbRb9wwmB4/x8WHbo8Qvc5kgL7
SEMLD2rJFrjZxWRJp8LKgabzSOKW6us68ms/hatrfnaI5IrShOpSCYUWlQyK50iC+yOnrDD2tU8K+FnR

dQRnPRLgK6GbpkySxykkPnB1VdQT1UGAFNPiVRoUeJ2zIAt227Do3Q59alPk03uehxvB45pR0ZUS+nJa

Gq5wG58UqFVnGYDZJk42JT6SAXfBtjNKedJ2aUXYWB
WJf1wdVWwC3Ub8lv2Jy4Ecyv4ifdUsWpIUQswxF4sWYKZ4D/RhtUgSX2491fkfJmS01UoVkVs4A+fCk8

vfZK2WB0fEXbaHXTjeA5jmzM2rJytwOtmMxoa97wTm30djMfqockXMqZaUaRDJIe4GtGVnd0jIvLHp/a

P9U7HUG+rqSeg8pgy/bYUKTXMkFvK26zHhj+b3KVMU
jfvwCH/LYM//dYR8QMd0ZgZmE7EhRxoAv6iFB58TB7szZ0A0xFEPenzHUn2ecy81B/28NCFfkRybtU1e

vpEsbDIdffPmGHGQ7yQuVNLg2wLhwm1vVxFsRyL+qZJ+nMrEe5zvQwx7Z3MxGUGK32VaQub56ebrT1rW

+IwE3b9fUS4+gifBuE3TO5pkPGI6fSt1MVmzHBZk0s
MOOZN9+fcVHnRVAplj2nh6cu6iPHyzIrGyTvhrBUn1c08yOCtYbAO0hVhm6Vs1qMtgdQkqBZp5mpDtCZ

tGRh7A/tZb+V8soWZVUzcsgm15v73rOcAF6FKsmAhfvxT16QxBrXkuP5QauBEGb1+o+AlalarmdORprQ

UCKOJ9QJY7t0GS5fkaS8iwyU7Avg5p4TLGt6EYtRR5
20IMSti8UXeg0QKMVrH/e9OP/3n/wAogQ7YdyIgDALVv7GxFQfYxlpgoutEr50S/UGLOe297Nb8pvPfL

2ogg5j5dxppQt5lt2Hlerkuqf0vfXkHbgiJx4jUarYZNzfGHuViguE0EJZSv8xJABYtttGYw+WkkzZNw

NolryY/CCC1MNqlSOZUHeuTd6rujF8y44dqUAnmEKo
/2iFjVF5AJj7bz779EmjgVGYptXOqli3W//INN084jDNacqOIsDf5rbcb28IHndCQ0FJObtmRMKCPxRa

uP3hHANTy2/jlWY/U00aKPbt1LrszbYjhWOlSqLTZxDBjJLJ6hFJxFss4JHV4RCeM9MKcoIu9y+M5Fef

5/xj+lqiCPGnV5WCLJZUUDw36glMU4PDhb+Oe1pc+O
/Fce1iZvmvynYT+UHY9pBqZKMPXDWJrLEvrB2Ik4CbyfP8npwP2vu5PNUnPIMVLDME4+fTQudLrTqMV8

uB1aeeUPzyapXEqu/dxpg8ZyFSTtjClIH3vLA9orQFEPh1aL7CoLtzYRvOs4pNsf9hgqo15EaMd81FqH

3umir6H6+15UV6ZrgLlsZLoWc6JcjJ2H4mlvyIW+1k
92uEKwgi3EvdRqaJ1t2BJrvc+Wf6wD2ReMswcMrWGHzMmEzg3Eka92u5lHTicoFncZQ5IqUrGSC+/1b3

blZVpJuu5v+Pul3jBF/4mMI1Qt/EgPob/t9IL/7ULyg2u+n+TR9HZE2GZ1q4b8rYjImsadDYDwYCDshN

htmLVp7Sj8pLIBfnhcAHb4CuTZzHX0ElSkyvTq89CK
949TQ2zGdpp67TV0ctJFKPof8Isolwbex/a6f6sV+L2mvGDR0JYHdJw3m6oA+OgtsJ9UmXHqmIy8egi/

uXNprhwzHb4VimKHRHr8CCadzvQ1kOgRuH/EopXl+TFPpSsJNpvHsOtiaR3lIuCpp9atZAKX95ftshGO

nNBwtbrsK8LmGgJPKSxmGpB65wLcAl5YYPO8ZqUO3C
9IubXerVay+CvD3nRsT1CKga6SQ0ibqkLA61DQUpAGt/nOK+jOw+20YV1GPPrDvh/osyZEKc8gG7ikej

x9GM8+Gw6BoC9W9Oe46wY5UXbWAuE++soUObkCpLvc6NYyrV8544dmfk296bS+GI4S59zrqeNjubVoty

UklflDZ1gbcvzN1+nIdbIoexAnWPCUd/Omj8vJogdu
CaA+dNIKTabG+QCH1akU5Lqly76njBHKuqlbQ5Ji20ymYEOaQm41Ds9/PpYzJKl+LGctL3z1ZqLhdQxT

CYropAsPHiy2RTieyULlLbHpVhjaa1JE51+38fwWf5lHQrjWbhA7maeIaGWiDK/U2dDCUXFMvGa/avhM

sa0cafCfQ4yaD1EVTft4c91GMCFHKnzJGcYSxI8TIo
VhhtnnuhVnfSkX/VSNMlSb6XJvri0nYHxg5j5KYK7nByzCEl6NpW7MUxWKjuoPq6PF2ookYydJ/3+4yA

uHSHmAG5mlyvCnTr+oABhPvor6AE5tchpB4m3VljCS7GGer6GGDBz80Md2guCc2dMRxCNEG87PdzWQqn

gd7gXad68OuSpXe04RQ12i4hnMyVKkynYoHG8/pC3i
DNnqHZ61PfhK/2/S7/n/s7VrI5SbKfTJLLYIISU55kr9vbEhaSnsr3nPK78UGTxm5XJxPSJs2GsMRIEX

462g63FIXLJMnchxQVxuuD016QLoktpC/uJGP4knNKmWuY8jSozjB0/+hXoVZlEc71iiVuQgt9vbKTYi

/dYchmphiB4vJnh+rOR+4bwedws3xaYqvC2gr4FTK3
7kUrXlrD5KAeVYhB7s35EowwG23onOZolcvQFKFDtpku+k8O4aeaXvthc7+qvRj/Nrr8/W9uXc79PGES

F73D3HVqjEtnb4WQSO+gUnYa2gBhka77rHGCAfPlhlXiU3ArgvU5qqfGpC3UIBd3U8RZhDrMI628iW+W

Iu5nRjSb2j6ViduqOqMokq95eAMrB2CktbxHIA1RC8
72xN0ZF1nueNkATIjUm9Vp0NSWjWYq0fftKA4SpxNRazxMAg9IyTtpK35W3NGokYeXF9NUYiTcmJaXRq

nP8N3aCLy/4A+m9m7x3bFDumHAW3NiiuqaZ83IFF9hiNXc9b7o2EnLlmQVWj8v5PSSY2u3Hx4z5GPao3

EEIbl2Sa7+LIZPg4dNluN1MhgGgg7xTuRjWbfyRMJJ
ycczUA/zCulXAz+odNM9iPrDfWw/BaIXXacUqzLafZ9OxpGQzF6mti0w5e9Lf056la3irUb0f5ehcHQA

Pey8MbsPd93POH/zBZAgQ6AzRaMlITlZGBpmH+H5kfAwMTqrTHgoOBhYXuRhy6LIvttkG5WpAmNaZaSF

85vFfZcBjyMiMYYFfqpdoKYzU4UetPR8p72VCUef76
0t/519IrNJkeZ7vecsp8M/cGtIqQ161HA8CFIPQp4WTkBhpG0vDEblkov8A7B3X6ixcipoA1m19jwntM

RoXxXORsQZrXeiVoXLhb1OfpgnlYj0hAD4RG2zUzzWmNkD9UT/vtikLfqp2/46fM86/teBKeHXdbsnQq

audRmoq9VShwcOdB1NQ+e474sjBwGBtyFqiuoo+ag0
hRnXx4hhRgjG+Baao7NfdW45nKxOOKQlNK7qoziqCAv+p1fM6PP51I9uRZJT3DW3hoCrBp3xPYlnuEql

1uk7C1G5NtlLYh+t4ZOqtcYy2TTM5IAN99GZgBCFY6oTlnj/NydIEt2h9XvIQYMa8iI3r+nb+7iJ4uXV

mB8P6CizkbeXjD9qscqcH5EQiE6fUXNDbxQ43nTqkK
hloR19VltLC6ApjjfaXf2OotBc/5oyef87qvlc2Jn9ky2d/GvbIjwusG6m1y0CBrwKtrabTt/jW1tPaf

/OjHpieezMJLgznTfO8rS6RXIM+08lkFjfugcM6yY7AL3PkxUVb0JjmMpOy2l8y40Ul2E5TLys2mwSSW

CW6ng9mp5zXtABQm04at6JXj52RmQN59lFHfwP0djs
/Z+ztTcFT5S9PYXSWHcJMIyeVkk1SgfXM1RdyZphM5S4Bh+bDMGOsGmTxGrPteHzN+52gNdwR/7Gc1E6

0XccbamF9q5dWA7AorKZmx8A8N6q8JnFB9Y22ks+KnmVBa/orynwwi88aalpHdcFxPyrv2QEI1mD7cu1

rMzZjo0ilIaZTivMgqyi+/U6F2ui4TmkBV6zvcHrIn
M0uTfc+j0bjbuMhMwgwJ9T6f6ukwD+qNjVGcP4u0xxnkRa8eMlx/UKlc9hgZskM0JANi1YVI4oxdUoEW

XKVV94+/Zz9MW9K2VTrMKzj2UMqRf7KiWDbdxysoctv0nza/Zgh7+D35Tx+uitZV0HhrNthPKE85aR4L

D6pTCcmHpZp8mJMgxFYTNeUhW5u3rTxZPkc+mXwTGh
mKNGsbGYlFhAS9ioA+xIg8RqmdRV/LgHlm3kkoQYD66zLHdyIVwLh3t1hjDGl13+TVpDWEEtWcDv9I8x

kMX8JFJmLzkBtiH8s2WpPwNbDlG9Jfdfj0ZAkrxIwMFTziAa6k/e/T5fy8RZa6cQdepYlv6wi831Uoa3

cuST/fXEq/fZA6dsUYeszNAUhx/LDStuqN0uennCu6
55dVAhl+sp98H/HMTde6mfOvHv8xWaFpBnYLxU9PjDz7gwRm1q5p+L7GvU6jUd40dmVL+nIqx5ahCN7H

a+fCPu9X9NhMCMBeKvQcRwhthYWzELzDy8UQWO8TWFkcp4o91A5EJ+nIMfvRZ0f9L4jBUvB4UP+br+Hw

059TWoW++jAeHnjRYqWGU1Q5ApBaZ+JtBSOLculMSm
ew+irFmyRdxNLVi7J7dtN97WXVBZbVku4rkZAu/76eMdAgOnbO7KpA8VpdsPwxAPrwyCB3hkZPlv/3RT

X2RFoWtwH6FuJsg+mRYBNYGBawtQiSsqEP7+Orw8sSUFb2J1xSgAFLm5ObDMJh+6M4PLFgNqexg20G4x

cTg1D7aQbQUV7NL6m2UeVheNNsVXm7JoeEXAo8YaXS
PLKYfKlgnU1Brw4WgjQH49+KvVdeHooyrRS0Eic4F2VbhpbB2LH3PTGlbtWSQvId/0XTIdg89T4Dk7AT

STKnUCKMQd1QacRAX5GIhUXmBbRoKfuaZLo25sXWtUB8hKNJlPMI7uY6KERC35p+U8UyXFcxfPh9OYsW

ZjjGpVDNm6ApWx8dr0VVObiGFsGRc5OKsdy0KKOAWk
eypxC5W4NMJlArDQeodySqdKWhCbCGbVrp7Hjph/UlL+5rmWvK4E3zC5Lm1Wtu+ftMy7Y8I7HPKAzXK3

FVZwcpBcP4eypz0XqMH7TOQlBfB9mjeHMpLQjZyvQ+csRZS8vN+AMxe2giXY324L+BVs60XP828IxNvE

nSzVXyfkUUeG3UnV/uM9mmeqC24mYv3ZfdoCNhnSid
L5vXa0ZnoixjHn2NVTCRTRrv8ZqKACfTFD6KNa9FtQQC8moXOfUjqGxIcbXNLmOOi+CytL3rzD36sqvS

92qJzap+tWwn8BFjlyNziGenBiXG0l0l3xiigSBqD5xEwyXiZeWq/8tYy/rF+upRfI4/pgPts33OLa3l

G3EUEnOH3zedLOL1nG7Wj/GNa2y/XMJNVfF/t0sHqv
VoXO6B3onXuIwSpHrxlUQ0vq30wDg8SYadP7Gj7iVfPMIS2jBtIvSeJnB0b837TL0AmUX88iyDG2LXGW

0r+KMtqH446ae/yNIcJIQB75Fwqje6Ym66ZEt4/YYITI67yMGl5Q2QSKlxVywqRBbXaYA2GXYgyr8sr6

XS+7YHz0Rl0lqPuQcQy2j36HCKJvb2kDbFwrMxkVg2
UuSnbeUrsr/eGP18GSVv1KFfmbRdZUsqaoIpA9oZTtbi0NGrzeXpQYZkdoD2EOAt+IGFgi91r6jQ1wU2

FDXQvW96WgN1HBAY9pFMhoTT1+EDHIAyz1qRMM04wJPIespti3f0UPSp369+bEbysASjvrAJ26X63/UF

ctz5KBR12/FzROihSBXmBjfn9UKaS+t6CiClQPgkdT
9ophvhJ6W/mhdQ4FFTcxOI0/AN1VWEMZn4FKT5iD8+GBPqNIRPwDOkKo7587b1tdEXuZO+ri5wpJuX57

DwfLzLWWS95sBC917H/2a8fx/0EvZzoaBpw3DTil4+7Z8h0q1aKGJbDA+ZvBZCkoC2OYnwvBcCos0uCR

4iNcOIFAjN2m/ukHEyQ+klSZAhMMU+06PYhmGv/gUV
9LyLIXllVmxEBigqvZsWaTMMTuyFErsqPPk+VkoHUPph8qDEmdjF8EdxRYaO5Bnf8Kvvgi7DB7Nnxlte

UG2tPoQr/kypsKFz31rBMOB6mHkeA2cYItZIW/6aK7Uk53Jbwbwi4NqW8GDpYVB9sFbYBKt5pKRsQAHd

LWCBLVMwr70ccD/7vp+JphZGUY6y/piCCO4whsB6WO
eA6jYGJbz+pcQmlmDbrCZPg/w8tavMDKWHjyJlCssHkhSyVZqi/ckRFn9Y5U7LfvlzuMyiuBP/khXryP

f7PalFDJsSI3RRnhJBcQzb89MYSvN52qnyeMXZRbOWrVaViFJ+94RvbJcvP8BP9J1SZER8IGiGiW3f8O

EHkYeDZPNhyPzokolfc7y+iR5cakp8h0yFsnb1oPEo
og6zgqUu9KhSaFpc32JI6guWXf4vF2UKVSAYQS/ij/lFQsPQU50mxmtXbtGOKYFCC1UX74wQe9Ad5vEp

qrK5Fx9KXALP45PI28AJFaEdKmNEH3l8HijICHEDlFgIwQgkXYCHG0Glb39nvfEls1qLfyGSjmIoGfSl

T50LUuD3klWsQPCR3f1ucAHBfKR7hidHWEl8t2g9qi
VZ07+lrxzKTTthoQVIrAz1E15TYQMnOU0MglK0g9xe6eHea8C5TgYVjk3GD0EgmtWxkr8g0wk0x+iqgV

RqmuYmCJcNMdbgiqKeoOMbhnQ9JbXUqdZbEDkRwas0jWkHksxOBeziCz+mcFhwpDJh4Yu+ef/IazapaE

2Y0FLxZ6zFFt3T2noYNzb2tL7bGuspq2aTmvuU+iuW
jLY/MVDGG093AccD5zQrxiZYY4YW1DnRFqqOd8Cenp4Fpqxp1r26W/3DcVxZ6tx5s8IQxWSvO+tjmDlP

B3mufpSys447RM9RD2NOJ6JE1WrngeegV+MhneHIRTASO4wpFBvosmKWjoPeCeZVpTAA5nA4JXZrmVmF

IonY2dvoHQvJL+5k2rZEPu7MVIr85wS+zNU2VkHRO7
CrJBGDMyG0HUUAE9Vnof4OVWcGD6CFw90je4MLEJe5y5d22a63cEOPz0EaJwuYbYQolN93iAMguBVeQ9

R9XTs7cnfvybBalWjM34CTMwbJgfNl9opXA2On1qE4UKjpp/GQyJmcvn/H1YbiCbXd9ZV2/ltQC9HvFM

+NyoVqfcuUQF/PoLYV4xWfiBm81CgOrQgF7l6mnILp
1ixmW6yPWxUVX34VAd/bUrFgX0SEI7Qb8sbSIbuAwWGj/BvAhYTyPeeY0BCPNW8BWMxmmNTrV36ZREOO

4gc3oK60D19vrth6b4U7ZCscIjvir9iNlOK8y2n97YU8y6La83xcjwKgovmc6dWPAl/fggvH++jwxcfm

CMRbf7cz+x2h3mQ0UqjBRj5bcvDG0piVSsGa/zSI2s
Cf1JwHfHin6CcQzg/AkBxUQ+mAxHk43H0njjUWE7H0NbpUJSViG6xx2VIuCSHdBSUN4zk9kGBySn1u+y

soaeMk+OOuLH6/x1EsuS7dCqPBSwABTX3ivpULlE11iwKFknd7rtQe1bw26oswzQ53b6qv+J0ME11l5B

1t4oAlS4uAGE0nod47x0LzpbZrL+Z/5PNnUckUWax6
0sOhw5/+fBOlq+pegNMCV4/vxAKMGv8g2XIi1MX8iO7ofNW4JjUreVySk5icoBGTusVu1Jr44+f3XcvH

0BIboW2mt5NtOxQ/EN7ju0Mbw+KMaYrXa4DtPmDLG4Mp/xWrK3duY3JU09uIi6Z1KN5Hvj89Ww1DjjtJ

YfYxE1FqvgZAECX3AHRJVyrG43FCSlrCNDZ/ByRj+t
p3CzxyNkk2yL8651brmBbe9cjKLjxaDa71LoFpQbqzVzlTLjKmfdlj0EKR2FsfpjXlpy1CpuG6w8qvGI

Lh47zjgwcx/lRMRhyMhxzdQGJGIunsAAaYqnjhLSqHB77tGV0mlOC9VuErkdc5OFqkzlUdHr3HfS6SZz

bVMdDxhI7bUV/eAG1TRf6lmFOemTZJz2NS7bSzE0S9
cRPTgqfLgY8+9N6xD1K/Kq1GiPxUOtiD54opRzqo+MueIjgYnzP4p06FZ6pFSBRvh5yYx5LoBC7u8mJr

Z8p06fumXYdEG37Oj/OI/SbnbPxSRbEWqf8wRn5prsAP/ix0LBIty+YbJMNCMW3fv+rl05JnpOgSWmiB

Fyd41o6jaWc5cy7psVVa9JO1kboFZRMoQ2Ge6OYXu/
AGPhERVa/q2yd8ImDz7E/MiBXN3sszZ9BI1VVImg/KgyAt0olgdaiUtNAMjA+k6/2WYkPqJ2mz81AY/L

PQokTPH0KnxvYQzCZ28uLOBttxqe3GVgtzpLFUbKnNF6Swr6vEJaReacNedT6ix5DqMFRL9azNLXRc3L

dFEIMFF24cslLFxVaL1wt1QfQeq7OPWm/XksmT3Ml/
pPgLcnr+hn01vpHdB71ZumxpWuRa3Kvyv0uMrd9/v3CBw/A+fmIY3OS89tuZMu/k556BfVB4WQSL0581

DejbQJqAb0PLv/aPDVi5Ek0eQrtLqe9GlbzXc53FGsLvRrqR6EIC9mbSeqXEVCcANg0Tp7uC59ohhLJF

TvSFSQfaE/p65QqaQHMu59u8Pr2mnS2xvghqZ44ZVR
1Cr4AYiNpztppciyV3EiHU40Fumjq8W6x1CtgXyCbvUm6Aw/P+enqjrRlr0U9OEeGGJeIM8ARYbIwbe2

5i3Hlt0k5ka0aOktf2KjHEYSzqZK4kNRZKRlohhhojT58Pq+lfUGvGL6wETy21Afd+fPhg30//p9NTkh

ALlguA8joe2iPETLYaAfz5Ug5ybbsnWxmx+ItciVQF
yUAqiiItob3695JDSlnEUjSRbhDffKlBDjAgYs+kMSVQPBVLYmr8SnLrj3Fbmx6Uz6eInhssNknXNL6n

2pnEcfRfuskKxr9Kt61niSlKvtgnFtXpG+dBEy6Z/fl/mtJff3+cuU0dCUGafNPDPHq+znlMYAqlUM9H

Xiv6lTyPSoh5uHKYzcfb8ncW/2evCPMwlzRLqTwH+G
L0J0WrFC0ai748mC8NQaszuVms/XDY8nUpr3daYQRWfgiCc9kknKEz+ItNE4kZc61oK1bymAtfxJqwYP

N0Q0QicndzlLYPEtxCicIcDY8j6JsiqdbpnBgiNM0PlfYPkqIyu2DZGd2xUwcydy/3Vmtd36bwWMZfuH

s6paB6REHhz/NqmuMf8BwAH6CpgHf3BpjYroLRt8+P
sYmU3VDUxuXfP1USNRmsv5fQdALtuTHiki+viLzUNaXPq+6/b0Q2GmFcRN7sx3eGWboceWKgf0C/VHG5

Gi7UmWMscWXbFBZjrIrBqkAQZ8ghOJhHkq3L2zOKBD6lE1urRmlvBR0+r3NF6xiNuJCfhmHJgT//thQr

Dp6LxHweuLt294OCZYLsQb/lmcMgIJeMWdbn7uXYMM
3aygpqgU1Z4PhPBle6wuMS0GWXg9wNUttQEaAzmVVFcOaAI+8QH+rxlrFySCii1tOYMW6ZrCu/Gz90bd

257XUIQCQzkeYpLNXYGLIk3TgjHC7LsJkd3xmfbj+OVjhp9uHm3ZV117Is7PVc56qEhsrJwEpFgrGLH2

Qe8l3E5JxQZgBx4HStm/zhFeJTP68PH7Rw8zw4pyop
txxfqzcM6OKjWUqDuRyiYPgBavvWxGtlqn9HaqU5Eyy1dk6qWWTt6U+9SmKph6lawxbtx0Yi43ie4iuL

e58456FiU4p4UzhtvB+f7YpCisvGTR9C5ViPKb5W5e+IElw/VjmRv7ExEYmzJWHad5vJg441F5BQMGIt

U+TrTSdnk/431Bsqs9ItUMnf/LZ8eSrfMZDOt4aajq
mhoRajwji+zhRz0YF1GT2XDHXJQFLCNQ9v5ZMcx0gbYqEM0BmREpQVa259Q+ODCzu8P0lkFgC54719t8

1n98mEtykxCJNFs/R/p+SFTMZRzyegbBU50iXLkwxvnNCmjS/ujCsr52MVMbvkROXeFMbB6Q2P7vrg+S

R+nj+Q4FB08NqNHe0t1a26dep3A/lF3J5ieHCeFwc1
aCfJAzA4CnzmCYB19BrzEvhqpH62eiocGgai3xUOk2ROBvxF7UBkX1IFaK+75MUxP/agxZtKwXW4jZgI

BG/NBcO6NQYEZTk8A2bOFSnUkFmlM4uUS7YPH4Cl/n7yFX07pQH5FFOiJsMQYVUpjVH4yUZ7kwH25W5k

XesVMdnKRN1CDzj0T5/cO6S0bKBWFQQWrQlh1tIeer
g38dP3HwfzsAEm50YbhW3moS+Aef96dq8wSiV52G2K9XUWJ+AGbTRv5TO4nI5/GOGavvGBTHCXOjbulr

P6DB67gWXQoO4L7n7a5BdMAHCLonuVorAYJRIHYouo5cIitnf1nP0GybiMkjffyB7N91HX4LsSaBEzib

8pDPvqahYltLIoLoPf8tWDppRdTjXHIjTsy82vYDz0
AQW0CAuy7NJXxEGedIphadxC522XEUa60VgeQjY/VQmGvw044IQolA4Z/PVcllcpABkhfgeIhjNzNXVi

XhWVKZeaVUXU1++NUxVJKXf7TfOtrynb9Hog2lrYW6UK1QysH88oYKQ+SZaje/UKZE9380c8hDYhDuy4

yzrAR0SEwXpicZ531F3CpQw41XOsNUbhYAqGUwhPvE
i6Gon0DS5NoCpWf5L5mtN+VbN3VcMRj1fnQ4UaV/k8kflYW1wPowvczfqjEYcdb2kcp+jcbzjPyesFIy

MeJZ0wt/SVS5QJnJmH6nOm7MH5BFFGt6J25S2wio8gfFiMgrLqLRhUw5W2DP0seSAbItoLvN4SaahWoN

fR81pgdMEjJ5h61p5KfyEXRH/a41aXl5b1QtC6Nd6p
W0wfv/E+IrQQ1xBbAfZWWimx1fPD9y4B9CxpoZcNvJSxbajC7gvwJX6AcnVnzhG0II6wq4CSfEGUO2Es

kiS7XcvKYQn2QrDMqhaZL5zgYkTDZapD24V1woV6xfg84Tcvx6mnumL7F8q9gkqWTnwljWktK+HT4Uuz

9LqOl+eTEU/cMl+/1uo7CEeseepzPN4khXt1MWUHiy
X90zlxGOMLLSRFZwiOo3flk3QUbm1Y1DYX3ceHJ9KNETbB0TEfi3Y3IUBcOfoM6boCzLze0NnBTCdJM9

4tD83zrUpn+Gf1Bl4iugvu3pUE265Usvctlcwlhc/toN6u/yiVXfeiWj4M/xczhzg7oeBO1AURuaFUnZ

QLjN7jzjgkYjUHZFmy0Zc3OSQT/O94N4xRtCVhOpB9
IoR7/PaZYqxv/WcgG57fY2nBZ/t/pwD52fbUogzXJLzVMwE3olt7mWIyH5G1PtHQKi83FvEpjb/vr78Q

/pn3oqxZTZuBgsHYt4VjrDFWxAv6m4Y1u2CnxLNWy5kGDLgMyOo/vMUqa1shfNIgLZVJ4IxTJibH/lzi

VcnWkugqyhTDAiws5Z7ROibFvO1vxXlAgF0Q+Hilr1
L0oRRpFizC8Q0OekAGoF5ICn1qhl/2dQN+eu5NV6SE5iA9/GGpK8A0BEEeoI+45DDRwf55EFPjesCeIr

u7UUrigmT/Axz05OHIwtM7U6tW6FruKA0HjS+8g7vwPNL8xoWR9pz46b/blXpic0yipvHkejCC2T0Xv2

FFIqyIfVmIIWJTDfzyuopNvgDk+MkGiQPG0XRugtUq
GOx6RLjF27q5FAqcbrfswvCZpghD9Qwc8Illf/Nvu0bIGI82gCq/Sm8T4cx6RwJ23fQk1Y8rihBRfp0Y

7bR5TYB+X/RIADrXOAtOihm8QM9/HD7O7+oxWtKDLtqSrc7XAgwyLjpJFJ5FlwRj6OfWjSeqwEHTlivg

zCKFo+VjzoI/lWnV4e6h9G/cNX0hIPqZAdRLv+QtL8
w1RlaH1qZWyTAElWNR1Jp5p9kXrqX+6spGHvMFYs5dfxsfHCbZ7SlGKNCoW7jkE7rzO9t3z8vlBZBi5i

oxqWy6pkzlxHOWjFEcGw5kVUed+IfPUcoRh2EX8obr9bYPBQ68sM4/7h+X4OnoUTgxzTQ5p5nRtj8OvG

clcSvYjsd5sGPB03emiyTqqH+4cEA3ge6TL8G/leXF
ZLC8tP1pAL02y/R1kpTCEuMVbN0WekH8T2+RJFcgbn3O810hsHpLrmGhS4wWEzdiwcsvnsAJhvNJR+IX

R7abxey/7WzuNQwjmE+cUY5TvKen+7gtvT0pu9Em0pJh1J+wf8rg05uD36l3Dz6QKjcODV58wODFK8W5

yB8IQGeHncyVT9FwC0kdVOz1J7kr4Kj/NUdOrTOFTs
hoFLYRr8oa6jr/kmdGrUDXJ6toJLOH/waWR1XSSq1T0vcXffCC/7rZlISOy9sWeCTQ2MI96fwSO7MHWj

haS9HCUIqH68wmjsvdFyvIFyogyi24UivoAfdGaBh5BllaTzPRQegtaAvPa/DNtVPpxVZR43QJDmgUC5

XxqTKpP1aUh+Qb7EUK5c31LaSru/MjRW/G/yjhqAcG
A3sQM4wo7q75MRmbrWMIhzaQO4edl2qaHqxHuSE7KS/mT3qHlcNG2BqFFG7g1U8roz8/DuC9DSve/ns3

rfWCh2yv8cUAWFledtbKFcfm0+6iCZGCIbeG1o+ffuJ7nrwScHt5ldMSJSpbDVtgME+XFwtWNdLJNb2X

We25bsuBqyesePrQdxfvES8pe9epzWCLPxT6AMJsWc
oXXTXjduh8nuenW5D0yDJzvJodMM57oQCLZS4qKP8MEL0vzNJki/DJVmwJG4J1WnCvrSpQN1q9Jm2MjH

f5k0WwCNvL7u7SE/66KdCCn/FrsVwqcjwZPRcjeoaDsaqFuxMlj8Y4VX5Yq6qeQVAnlIeNWXiRtC+30P

o8njiAfSIMaqO4L6fwih8h3LF+t1es86eNeyAE0rRi
m79OM2CPVVSDivXAEJBXd/BsGAidfNgYJOnVS/6v2RMqimiPvNL7FNL5zRIkCO3Z8fObhRkjx0ALk0Hk

Yj4SgaN+DWfjxuCT0udTRQ77KhXhVeRHWDOJ058V2lgaH4xZsU3Y9F8SDu+9yQvsgbaiaGGTdGstMce0

P0ueqDAWgcjCbME4k/P/Y/9j/2P/5gmQD5uiqyHfXP
EGolh5oBwwTO+3no/Qd6vD0QeotROd5Ngi1H0ngOl1SbHWdzbKJ1Hvur2zjtQAgaVYFQRQH3n9DIjweO

v4QBGkHv2O0s6xqQgjydlMwEOLrtC5cFuzLO7rM3f+JnEFXO0Azo0+jktNTQhJ+3eXExq1saOh0ZShlU

kp7xLJz5AONDh+tM3CFYNXB7NJ+ke4KTWDJNRIZVwM
nVWr6Y3RgHd+AZUVzk5NY9YPR9MRQuJG7ZVsfs+MdqGpLH/7/ZreS4z59K3Z7Qn9x9ofOyW9XdeDr42H

T/DlTJ+m0O7AuRPiGstsB6JFxJiVK3Tp/A/0JDHlrGAIFiMnWHDk2Twjcx6zj56LjAJ0U33DFUEkuV3M

AlfiDySiJTI+sIz02h6Yt0/GXE6nYGh5TDLsjPEWFt
oonActDcaAmJyc9U5xy7cu25nxvsijc57UcaBR+s4orIIGtxCXT8xxzDmdiBov9jiANhhUOGh3tl9/3u

YZN9bkKeuzNioleOv9/WhqTedVoB3AsVsC+j7i+McjnKLkOBzN+WbzJT8G/Zf/AGBz4z90oDKdocktob

Mufq58LDjkqgemPry9UIcG0IbWkbw/xCjOa9+qxTPc
agOoJKUJd43UUpk3Bwrj8tkE0TkBOjNVUHju8wPXcqaZnqv8Sa2CbQte5vMKQ0IQRLpBL9O58v7OtNWP

quNSnTgQW6fWAq/IHYB2x/UXATuvKSQDWqIkGeecO5CV8hxQee1juJAaTLF0TAVDp+CoFmyfbkdtut7d

08SEUzo1kD3ZmHwN8x3n/2FjzEiT0VOnIOTbqi3iQ+
s3EuT4xIkd7ySDcjJi9V4VQUVJIMnhlVBSKnOkerj9Ck2Pxjkxe6e9bF5tWz9RNTeKolDBjQvbnDDJ5C

sRClQgeKBDDNGE+RTYcLqCIJ5lBjas7ScTgIa/rBKmb9GCqC881/i4oO6SuGNQ25Nit9nmwX+DtIea69

lhQFcP82cKl0aK960EJBiGXEbBy3lYfoP1mHp2cQYI
u5b7IN82MBBYcoO4Gsi9+gnS1RXfUmCRdW4FCwNzNfYMazHn4osTJbUEuFGJldZc+XMCKb2cRIL+k6Uz

R78YfbXC3CAvjYvgt2K0L1u/EfpKlyl8eOPCHKdVABaaA8Vn1znA9rm8m0IHF+5dkFD0nzIlElEslP0X

U7n2Ttokq/XvauZL+ymUmFpDMNPfM2ew2X4Yy446bG
Ad7y/GrPRrnpRUBn8+Jl4S8BKc1tJu1ehnfz5fygTa+IJsX2uRlq/2K5kvl8ulL8WHRgYkHnDns8TZtq

O/3VX+dYkwGa+ixfo+5TVbKNKlxbSXb8eoveIQZU92wxKrmZXG6acA/9fVho3cIM2KJaT5e/uudy6Mme

9NkCTy8L548vz8WI7UUSGDCk4DH1F2kl7+9yIYrtEy
LsaW+GE96WVK2WbaSSNnkEyaUepzYjoCWkf466qemCUSLcOjdskgXdlf4SNfxx3+NChbIgT3EVlfJOgc

op9Z73PR3s1StI0XfgVyXSjRxxe/BeqWdT7dAkYuHuik1C6plAGLI6xMtJ+vuZH9jcsFISJPKwtO9pUQ

lDmvqgV2A3FKlZrYVrHWTeVhC76EGTwZNN+wf1e8rh
Au3wLoH5pEMdzpLZOaLlyq2qX0oNaWOFa9qDE3ao+4RC9Q/XR2B1UeDZob9qFgzCgK+beiZbyRyHUWZ9

8kSm7ASAQZauPtKjG2jsM/ht3d4yNR0uqVWVZzdo5OZzqAarpqNISxtrInbcHezgVYRABh8xRs8aRiSr

HsnFRcPqoriOJ6287Xxmx08l9L4+v7N19lyYb18sj9
hm3mroXFBpbo7FHBZp+4s6g011bqCFv0ddXXbAjynKTidao+LU4/6mjYIvlBpcpVv/0gjTW3MEVUVVfn

XkAc2qeVOWny20mCxf4HfaSzy37hwcV5PNIJ1Cbt0ZGhyWCrzghNIH8IXlc9YyazZUysXjD8nMPJ9yTv

TCFbLA8qN6kTgNFTCUtgeuVBSjvWrPcrYgLXEqW6Y/
K5qblmEMcYxaiyOsJtefnCm3Asc1bFBlY9kHRqLTgMUwJr1MUemOVX5vPFQKu0Pu/+S3NHKaiCW7qHgD

lwvoK5FMiqPbfZXJe4QpKBrxyY3h3KXmtGSL+7vJ29yPyjt4fyuJALazcCiAMdsBRFtruml4bilMifjG

8a0nXUDyuD9nYSn6HX4zivmPIU7Ft7VuHr6zNk+H11
DNjaD1R4asdBM0xnv6uxSjrivmhr3d84mz7NuA+ThX2OWGrQt4Z5NcfQhGPkhIxOlmuj5tA0YIOVmVPu

e2n7UZvUien0pr9vRtNSM4nja99+dHIbhSe3hEGDjo6eQpp5uvMleks9lK9hIjaqldUsT1yREHJ5aBqV

kbXXGxJxixZlV44HuKhC+F/bUucztMlKh3C/Mzg7yp
1qpMceZApbMqF2CyfBIbnkA0FF1R/NIXWkzrI3V/aGlvyXvXsJWhZSz9QbV270u7LjnJ3e+edZRejocO

dozmm9v3A+UGDxbcFTP9QWSBperedeprrOXg/u24vvrHhvQPQDXCtm5eCp8YTmS91eygHw6naAcRYOq4

YN8GKk91Y71LJMKqbvxZUp83iKOvGQwfvQEzY/jGBf
64NgWIBQEOkXadkv/wmHuyQJag2klKGzY1iJjeAe2zKMCypRzC7C21UWeiTmYvcAcRqX1C4qD+BKFdV0

9kDeQzfqK4AJjbj34YuWcnisUfWj8o4EqLr+jNWD+AJYngkXcrW8W5bRl49kWL40yXssMS3fZpjts/7I

x+bSzpwJRk5/JUDaLDg5ZPjsg+EEgrMfTpM03AZW9g
VjSTrPER+e5orJBkXySM2nLnev5KbNnD4djsEHyDC2yKNFoTSN5j6/kjOIdZq70Sgw+iXb5qIaMUjsdV

d/c785PF/0YxKPq6D0FejqWTWIsimA2N8CB/rFwQG2pQ5EyTbREIlsWnenlL85YTSUTGWzNLgDJjtnc7

BpOlNA46qEd1/Td950xNuoU4qT3P4Fi+QacRYPunnR
J65CQ96txzLb0sRDv2y/FzIpAr7PAS3EFARToEL7b/5sd0Waf4/4qAjXi2O5AhCvooUcVSplIZI+tUsW

Mop07fsLkFnTnmIPtcekmS0nu1RmY+sOZhaDrjcKLeUz4q1wf+JxHMxf3av/oKVg9T1F1dvQLGh7bVsj

PAmTVGt3v/SpvtXNCwZ5UejqOju2I7Qy9A9ULOBtKY
+vSRNn3doomyflU4HKsh1EGustXR+rrgoIsMjbFx7AmgkZjETjIKBcd4x0ps4EJ48DSRTXQ1VEuz8NF6

Ewnc0c/+xz8/ZTWjo8ddGZxKQ+8A1g8ySulIu/1ngQI2qb14VzRFPuLpdZKMWgzICPFmcJOA0ZOGHKss

uQ5G0kXNukPOoaQmnhyiGtFwz0O/rYg8nVw+wklzSU
sUH7xdZjIHFcNZKV+geiYZ+XbbDuT2vUljKqoU7Wkk4sB7J1T2YJMTramNBHEKqj7/WQ/Sv/l3kYFj5U

pbjsG0w1Av8u/I2EcFYdGk/5SzqjgygNRNPCa68d7EuQMUqsRt7ooIHmlr4Y18+cH1Kly1U8ASdUup6J

25YskwtgRubVoKWEtCN489l8/ZO0YlQ2OHBUU/+1hM
7mNI7v87F/Mw/RgZLApP6wKMcswu0TGMY7Hquc1vTlLMvaSITAPEqkymd0OLsupNZIZqH/w69xa8/Nsi

q6fLTJTnU/Zv2nI4WiXKopx6vlP2WY0pd/oy5Kvr19iSWdiNi18b+MLxz8egWiGBWko1+/337RyhoJwh

wraxarvrgGqGvdlG9DxAbwe4nanbjl67P+sMZ6gvq9
KqA85qLxn8e/3MNea4XtrlY78le4XE05DJEZZFT1hkZoxxfvwPm5c+oBpYnO+h1BxG5b/YhL2d409W2G

QqX5RjQ/5zcVeDyNxbbvE0OluuLNXixVK9JZB9uTkO1/9OQR9SmXjEpjaqWy6OYEGkI+KiSrQOMLaY8z

z7qmV46dIk6RIw+bEpBWchVWSMsm8aI4mhbeiiXTPy
b3fHwWVmD+MmYbK61ub9vDabeefgkOYI4JY/ENXkEEr8Z/sAwh6mjMmD2qSwaWs0hktZ+nRHXghG6MEL

+sUbtk5gw13XSdeSwpWvn2vowJEgK21l3aTy+ORlzEbih19qvT3KCAjif82TX/7I9z0eYF0ktQEmQwoT

POM96tid8TCGlFDBycAU7TugIgc6+yoCFWHIaE0K6G
td/u4djboIno1ynJTh1NgQguNSLWWn2PV+giZSWxkYJ0+7M/3NUE6jAA/kvYhYVcHS2CIAi8WKpjMBXc

2puMjyP8nf1vFa5HDbx9bnteQGSnApfiqkqQCAb8ZB3epQcpdgn3YGhDXBeOPDCm/oO0u8TluNK3JL47

Iuco0TN8F+aQobGZoGjtyO8za5A+lncjEXlVL+8zs1
c5P6JuW4rNHz1lmIB/8Y+EmT5UP9gsZaEFsecK0ZDnOqIpmy4qfvV9XuX/alm8kDjWnhHFDL7F52EtzB

BQVNkS7aOlQSfTJh0bX6f/GaedL6U2OblIXaq0RjMd8HUbuAuZeIri9Put4KeF4YdqUddRdkEmkhH9F7

B8lfcNesfwBWOE+QWoWmKoM8aDootct3o1g/7kvV4I
KgwHNBXSBfwSKdn4GNNfGwDzMAQwnGnpVJmlkl4pfNS7BL5ttyodH2Y7xPlUtAN0uYRbuzt/Zv+HpOYS

a4LN+z5Oj1x6XGd2Wqp8MqNB7m+LzNM71RwMKoFrUYIf6XgnJbx+tkpDDF8hsTnG9dTx3ERH4g5tyBZI

irQx0C0MdLkBg6y7MVzdHjcLlgAJWCYSMlHoSO3QSb
iFDWcucH1+JZQPMbHLPnLuoijpr3qSoLCOHn2SL7Am2QLjR8DKUIOeWjTTtInfGVHu7TTxHjdYtwhpJJ

iyyA4uc0KLKkKO6+FwFTKHAzFNfZ6V9dzUNFNaxnxRF2BlioDPTwn/3iCjpwZLVB34qQKkP1dYPbNH4F

VeRNUgPVWIMwbUZ5ZtE50VZ1myw6c91gq2QkgVrzkv
H0N4J8iGSIKTaxQdBqSVuNoe/jG5llxLPa7CHeC2wZbvx/8FfK+hw9yRvHvwF7XWQVVaqEw4T72tmZRj

UoQQb3msh9TRB2i3rawr6kX2TdoXVVkW6XcTDOfYeIFJPQGArlDCokyDVS6ny2gHDGbj6xrS36YYKQ5u

bKoTuxFs5pO/opT527hTW2tJESvCrafTl4AKUNWnYk
lWRVeVrJKG0/9c5jU+qEneLqdia7TH/JKwN2ebw2oyZQo//TI8swotYwva2iV8bwt+fU3hSdjQYWOCXg

qdZq9sQg0nyx+709EupA46dN+w8F6/WapyP67i5CMHux0v0lBeo0EVIUGWqAlmpDG+t2TNNuLwptWpdT

L4O++bbGz8zbNPPFtHjezP9IgNt/fDdGSJm81ZbVKZ
sIjbP2NHVPC1onbiVlGwZ3Up2jCjJemGh1GgZ5EkaAO4D5tSUape+SmszqD3YsQA+3wTQIsUMz0Kg1d8

eqrza5gq8z/3KZ5Kiyi0ki6KeW++m8QwQIO+Vi9vc6yJ+sqOdGkFdV9SioYLDJq7SSlQ9ORMh9vAhBLO

8LHbh+jKLfPx4OzMgIzvuteh7zUD3t2WRCO9zjR4Y4
KIGC+P9Q3fCx/i4Zrzk1wgnJCL09c5hcZpV6vordcwFq7yMtEDaPMl7c8O4K2vtEk1pItoqG2BOlgs+V

BzoZNJk1FJWgiDJ+LobQLPcC0/mK++yFSbydVNh54xRtLDvWRQ8WqJZ/XZyzVGSVlnq0jy6ME4vWJ5gt

FNI8W0qnUUd03MPMywjV4Qc1rOcPc/ZAzmoTghMp86
88jNuQsHDtit9bOla1zLyMEk84Vz763L+wMkHKwxi3v4830M3X82RttEz8JBtl5xSodRddFaecjR8fa8

OcqnuHN6n9NJ8HyilP8eyjnvXt/hpYMxUDLJP1Y5ixOlB4q+su1/oeb1A1ZWNW78qan0sGbSCfR7medV

5HM+VDDf2Vf60gcQIWJ+KMOvgUfkcj2ejd/sZjbGIb
7rvcd3LI16hhUOYiC7VVF6PSf5OE8z2ndP6HQ3N1dLUJPfTShpXucet7NdU2WU6yPjiVDrbyQ9+vHNc0

hoORCqPC+aNlmz6lODgevlpcXt3VxMsFZHW6y1oUGKgIA5OkUnmP10pkhEIXXOpcs7Qs0I2zRH6U/wGj

FYcFzf4kCbVEJZgAzZQQOfKNUOdow7J9X3PweyVS29
lRWnk6HzF/fHiPx9hfhOfGKcW6lT7uK4/aAvKXT7cek/ocq5QfKtkxBfue1Q4vUc/SrpXPN+mIeSJ6q1

XWACwgEiFKKiYlDmCCBiBBIiSgFWR4jiuxHOEmBy8OtKMnMkD/KZsH/7GN03emQ+ekEa6icUm3dQg9aZ

V1yu4MToA8dBhAbvRvKoWbV1W29Rykp2MnRWoVpviB
Qzs1O58zDRcDMmEby/YfVK8p72FJalU9IlDbOcnqe85KnSKQMj3fRKKJjHX7MuJfCj8wVyD+Qv73gaGk

YhMSmc11j1fE1jCc9OBkndJwRh6rSTWblyJeAhSDAxa2mY/mJx9mRf4e0Tu37IGxKYjadMFIjKHwWV7I

obDEjUHxwThUPZN3ZQvDBYKjlPBlzq/0OarP9WEjbn
XkQx0SP9CgV3PNC1qINPqEHDx19SA9s7Av8cN1xXsCgm3oyaDicmv+c4lxBfWBCT2G9j/mOG4cuRwQee

5+Ajbj+GxCtWG4ktU/4ZKfF/PFRwa3tLLgCOUuNnf0ZcXGeprFtngv7UgmNt7b0YR6o4D7EtrSw5FBCs

Xn6+kBqpM8b6hiuo5NbU4ee0k5yl/UUH/nk0pVZabR
jOKfIHEPP1G1qsNo6sxplZfu2qh5ylkI2oOti1bbJicVzd5s3SzksreMBJhxx7k0viY2ONpj4R1qUOC0

m3nUs8K/K2yKb8CeCusXN1NU73fqbTk5O+BG+usVoxO1CddPxxKTtcgoh9rJI1xN7/B0dptL8xT2NJ4K

1iHWpReIKeKSQpwFU3cj86sIdygOp7c7dQiIWimvB9
yqPFgFaZd3By4nba/gQBA9c6SmmBuBZu5WB09oKeEYt0JaxKGA+r8nraqm82sqqUdkiPSLJhcDyyelDP

KJ79T9T4nV12bsPv1SE56yCYRazHkbD2CWgX20zvrj3ybjM5pEMWQc+9NQh3P3A9F+iNG3xz4T8Vy0TK

0LuG+GOlzxe7tLiaeRuuI1pzBw8qypI8UTG8JJi0rI
1RPxqQUsIITTF8tCL13I7nc610CxDlTxKejajJnTPHx6haXQWNJtQXG3EmGMmMi3K1A3ViQ2F96cCScn

8kcDpT6Ydlux0xhmQIRaV+nAVIjemXFgpOnwvnTlybmURjUuU6Tp4AvhBgWmqMl3BiKZdKFSQ+K4zhgi

jz+xt6VDNQ8X4zU3mwH7ev4N81VyJW+Lh6L1ZW01g7
+bB9P1tSBiQMFBWb79R9K1g+rt4kfk69KZ7WqbB9mMPAZUS2SYSg7pxxKaFlPfmDsqd52SfufzFt0NaW

peJsmnwqxgVQkjTxc1j0Yzi49kVYWSHAAkpjKKS0twXJ1OqFJ9AaWlBzNxnCorlBe8oHU1E76Bz/Dljj

OPa1ywklOYoG0Sdnml76OwC/EFewg8x+RhkLBouBze
xbNGeAyozbygoOyPsTD4QSz5FvKQu6cf3mRMZIgjC2LgOHpgCMXJIVNYRWawDTKyPEmekTJfkzYcx6LP

mWFiOUQnUUW9YEZl95ggnn2Q/mSA/zXuBmLjdZ763otG3JXz0sc0W9UWVq8vOeHQ5Y6qxSaHypS8HJHH

p4ZWEIHI4zFqowBpTdD65i4eiRO3gGwthWvF0QBdC3
wqAoCZQ5BgIt3aHEscMNOSnYaISCkaYbV3mv9gyLBJo/S6aurWOu8BytLXg1yhYps47njwjDvzWoDShh

vfP0pGP4TXPYRnjTGIEcm6dMee3EliLl0UDs9ZDi4ZgWNz/56XhQQPJXBr3byXVQ1Uw4OBqoiBa+ro45

PN/ysL9khtCR6/VsQd/7Bts65kN61nMi6JT+W21fpk
BhB9DhZBNSgBNyBX0BNNmpXSLC6wtKkF5PTm8ZTROXpu/nDaoMvZGWUleojJOpt0vPbUsefoGmbODpNU

tyLWeucw8kx4NRy8BLft+ejux2eY+OPk/vZES4j3KRW5iyGqPamRxm7+vCZnPRq4IwTkTsjpY4YjlQCG

k6jw87EGUfGGuRyJHacF7oAi/gg8+av9okwTsXCgj/
QT7Fg8qQwY2wp3c4yinIDZYnv1iuYhkQbVnvBicVBNxbNcfmO1rdoMwwYvVBsWgdRxfNm954qr9kFyOI

rBTq+aOQ17dJ0gFX+Mcbk9rI/e7nySbKc+y5VsLcXXV9i4tOtJGlaSa1l8G+WFr9eu7bAvGqNO2mnhFH

7beHvMWFUeWMRMEkdcfnzrz/Ft/Mb1CSChCwV6D06E
cwBLLhzH1zAPd6i0c0DzdDUNIczH18gYRmtd/EWz+/T/ZIkPT87Ve25AYjoTaETXSmdclYa9uoXjVJuf

Ye0t9rU08yVl+IgbycksyR0Wo1pZ82GNdeTXs+95OA0gpB2tvG/EaJg4CCIs52z2VwU58spez4b7bvMo

l1tbPE1rORUNdP+Kz5K9EwMGVoWcnKBc6S7hH50WIH
clMtelS3pN5A1mpV41K/HfgeDfCfG0v4W1K/KBqR152zKeb3gn8lx2kdYQFtVrlnxd+dXZOqOb7o9w6d

XXxOyTQp/yyFcr936jWhRQCc/BILKB3Si1HV+9gMgWq0pVOSKh+4+eS4AHmjgG7/NM5g8CPXl2fbc8Od

szp07tscKCMwNyFGi0jDx/hvz7EUIKuLoZAB/+8SzN
/bzJdEHe/Iwxrk8RX4Z1YjUKzsSyZtPzQsYU63O77EpCnI6n4QjSLW9lsSH92cUM200CkBVapdcNDyxO

6OTNzgN80ieDds0wIILYzmtUwan5tGROSPsAFRYC59pbKR6soY9z5rSkd5/cxS7kC7LNLsZQHRQygqeg

tD4l+S/nxaYABC8YAMXbrxh/N0fkFcLFUGRpeL25YY
tk334nM5iOzAyEiZTdvC5bu4B3O807n9E3RrxYDu6KypHGXzw90872O9V5AbkNliwHY3MSPTDjsxdbh6

e9yX0dO6QDN3qcNRQKtcJp+avkM16tGxtt937CEkwPec0a5cvNhQRLeZ2yYOIvfwBd8q/73YlblbbZIK

KUJdehEMdRkK9SrtdwP5cUKSdVXMeg4uKyRVv0VBhe
EbKT+/vQibQrmEK+OOkzF4EIw9d+WAKDO7tsCRpmweI5zu7qOz1oDaECEmKxEk4L3NyWntlo+mWG3sYf

q15Ck8SWEg60QvBZEUKiDVX5wWeGfSG7K4Cu3EOEQVXvjG4eZ9l8MZ+OIkRNi1760uD/73gJCaDlLKgW

KwjkFRxEUfPbgPARLLAwKYHyWhDZgfIqJb4ByTmALY
LUupH0NEOyoAUpszEy4KyDoAl3BGbY1gisIeRHI/6sZK4saJGHtVUdBeSOfUjm8gwevPugM4BLz8ijwo

Zm1rdJUEmFqEX1r8B1S6US2A5kFL3hZyg4JSFoDLqS03gpnJzpJEoO0GC0cmtipgQ33BO6HC4YP1KSlB

VfPiaKtKcgJaQj4Mym5tWjdDcCd+NprSldZTCIr7VY
9P5uSBRewmmGnrVvhXqERbJeAoziJO4jvGjXX0RdO913ZFy9eeyVFI8Z0cmzloDinicd4j+0g4CJVkOY

wRT+qleJDUYXNX2SlczaNyjmfMAINJdYaiLszEw9yyow9Op+Lrm+BXZ/KiaSu9coa/0LnhJGkfrKEzGb

66Lq7buqm/rlPBYv/97zzjPeBTAC/+l9aIQvD4o5qs
uo95OuCDMVs4Vs/ZqY8XrEAG3l0fxOvLI6R6h3Ouf7SH9VBI8S65Im0+naxRB3Erff9SSI6LlrYUH41M

BsT4GXc4cBPOuQhuGGUFhxNxju0BZv60mUjGmnfw3tg+2XAPHBzzMmsTRv9y/Hiz00sDSNY1sPq7qOos

5mc1pRltLm2pruRThnD0dpfcJ/Emj1xkURApsoobyN
z+ZR1gF/pSFYoazglxvumG0VVadjUko1xth4Z/pax1HxmmsfMOwo7hg7zICvr/EBpNNPXIxWpzEzIwaM

s6c7ApiPMamZox1lhyq5hR5R9EgCkpUkyZrWbTGavb2aWzYEWbnMnzoGYT0g5DnN7/lyTUnC6+Xy1PfJ

OBaoAGnBVyT9gOVVZ+Jd89y55PDTFq/8F/cQo6U8R1
N3IP0LxXLEtw13xxhM8wFpan/NImV/EFecTFLyOvOH64d//sjcNBz4Bh9WcAtBSNSyp/jnJP0J3cXRmz

hZPmsDEV9qx98+Prso7n8fzh5WKB6wk6XZ6Ks47OwT/8xnwIs9qSeISPU9jD0mhSw40/zA3UqxuD/R9W

eedPnk9h5WyncKtR3SRlweQ2NnOf8EFZOW7j0Ywn+A
Unv91R9SB1xTAX5Pjra2i5+lgKehJTWfF4Kad9rw/tnqebvaIHc5YRR9ksNWiUPQW1w4Gv8VMXMx5PtX

4XstiXbHpMnwbS3iVQb7aAJtjrx1Oa5VgkOS92GvpVhhqVHapQ/7r9qxhnqErXmAf6H6OEk2iIXuaK+T

yA922CxthkUvlO/i1ZS7FkGO2HcE4RV+TSVgk69iCD
zi0KEi0lIM4nTfD0aPxXseg/2DCzr9qCBUfRJljVTQshpdP3rnzfs/Yy+OQk6q7abiBWJlh6VaiPigDX

CEZHUQqU/zXN/ixE6VpVt7I3ZS/FxAtkh3GATMcqXdceAG7l54juL8MKvj4XLPLUXmmKQX5wmuyh1OI1

bzP9/hqN4N9DS3bG0ihQQMRBWccmyH73BUm5HB6f/q
y8oXPX+edfCw5isRs9P4K9/m+L7QJU62jT3zAUxHHfPWOGS90YLkTw1/9GPUCJ5k3XvUBxAAb8EnyhZ2

A/GDj+UfFiYIWM+N00bwM5ZkrLIWKc1D+aB8cjIzBnTOHbi6uyMsngQSV4+gz0PWejDEJCaT+doTTfye

0LnJGjya16K/Gideon/m9GevnYkklgb2jCet4bbZmxgRI
6GXCMkbtDM2UJlEnZ2UvnGmJRSo2H5egzZNXMJh9e91W2eZYPVBdwhSGa597A3Les1Us8RmNeo6fiEBB

hUSVI1bAdbU44FqodHPScP1p8Z3usM/My42f6ZbSTODE3OFuQMGRrF7Nh/Ngg5YdqY8Tg3Fab8ewqKQe

r0QcG5STDdYN6stkXDXKmuPmapRonKVcP6wl2FEq98
7ud1vX9hioGomN3tiu+O4I+8B5L2c7s5fTQAHfhU3RYstUIojF1F09k7aEgwcYFOhtW/rdafIjG27mW3

EQBCB4jmDgAzF4NSCE94kfjFeXt5KvOkap3azuy9TNSedU5t4B9IzU5L6EGEY/1+1dLUPfBJqwRX+6R/

MENpbbMKRM28J5WOLxceEKfhLcfZ1PfhL3ZfyfWHAC
tuTwlp+7tamDUaiKc4ubZX9LWUzgUVFYlJS/VSyPiN4ab+BvSqehTh9hFMR7s+noIAZlS+anna+l084c

5g9RH7omhLfCTzfQEk5SWKMrWUwCT+10/chrn92EOwyLCdLm6lzctb0D1kmtJh6jS/J7QD/XBkrThDyG

nnap1u4O0nlJ9R60IGo4HXwVUGhAVrFY32UYVp+mrK
jclLqEwiL/aE6Hoaz8I90G9jZd9M8aFzlKuLs5zsl+ASjiksAatUJivE6uExwItbD4zTQ9MqqN9X1psG

jxBfE6G1K7+jVQTQiOR9SNA8KzFbyChbuz0dlw9NzvXIHq/l5aQ+66l6OaFsw+XpY11LmEKLI7vxez1r

uqMrqZ5S/BfRkQU3ww/2T+ES0Dx6o9mOXT0sERdWQY
bpfKrlCT7UGaBhfgcbzMb0TpZlqy1rWcBMEl48+nt/5RYrvoTvCqVxYRtkJ4mr6wAbjaySotgTbGIFMg

1GOy7tDj4FZgXrc4uPSIPLyvqge+RxsTwlqdODlKjCpj7LPS8p4U9xQpXZ+bp3UnSs/FfKdJEdCqzm8M

mR7jaB/0nceM/+gGbGyH9Ni2JfNKAmNN8MJ9IP/xys
rFpYzTBgSbp3YL9Na3Uz/fIfC2H9RKgkCSXd/9Dq7NZCEj9+sSRcs22QtddrlmFf8k/IIa0H+Jd8FPaS

nw1XzqmopFhKSAZ6PalawESnM1dia9GlywO5Mw4LKZHlKUhuEuOnYL7DXW0tYJ3VxxiD0vSJKJZwDhlL

Q0/JszkMflS/4KyxcsO6wVyhtCHSiCOuFOvuhMxaHV
fCd6fY5b4eiF45BQblv2xzqfehhvh394r4Xi5AlR9Xa9FDbBk+TkObADlI0M/76FYLHxCQejlezwq94O

sJOHXS9VC7VtDbGigAQY7WrmhXHwZNK69IPWyUmImrFNELeCcU25x4QAHqt2ANZkc4y4xVYwwFvgdPSy

e1cS7OSR9NqM6TC5K462sRFs232wE7urA4IVPSzkaS
iA3X/OtY1EN+VukludQhayfVo6d4BKBkciqt2u/L6qJ1U1TiGH8l8QyPLfjVSlur+wwgE2ouvwij5PK2

o6BzNzNzI+Zy3E8GrcUlDFnCmO6qsn5lYm6yAb1frKV3vyGrARfmPPedrp75zX722Nfz4u2Zj/JE04+4

q2Z8lDeEEJ7So3qooDECmxMfxIMcHYne9rlfxNM6BO
gMeo27M8duJ1KnoJLS/lN8vW/207n7H+HZ1woSPdO3Hlrp71ExtQS3QcYoR/6yp1v6tphnasdI6ApJiE

dpU2gFVC792wCI03FGVbUi3uo7+IKYwuqL4n2mUaqcDZVqPbDtXLfrZxzA0vabk6sbZIdGqKFkk8Ub/Z

J7SPrPp/QEqMXthDhh9iwbvVLj0pA1w+sQAmno3Err
DW6+c3avYN4GDWOZ5wWafFX8BObWeKnWUdsHZO8pY4aZuPD2ze6UvJ67RJI9phmNdsb2oE2kPg6j+6ew

tkjSa0qSW1GmzABQgQMiuN0kwhBp41QegFNNDiexUmTns2axshkPRqo9//3gO+eTz7E9qtNmDFb7eUU1

bZXM3fY8Hw+7W6SLs04lTuAP7wo2DbASEJCjIfurvU
9mhIVXrwh7WkcZJk7Cbb1LxhIKoluZybk8M2PowwKcnx14JSFZi8Ey/DZH5nu110fTDUSSxlF1SYCbe4

C38jI3nsecTV4ZQuYSjlOrujydkNNoR0pSRCOTumQLiYNn/d1uJE3OfNaNHu9p9qvyn+7O1f4fLU3DeL

7G2khGcoAhj8eSZO7xlLYjaubbn8uYPF8KuKWiy/lr
P1SDqdKBodHzFrMEXKd3WKbp+ueqtpFFRwzptKhHr11qf+nGorwB+/J7zu7juAqjorlMhaYg/R51IYy6

hAFjLYTbcApPNi+h6SOSETqh8eo38NU1vxNqTe35JaGkm9dgY8g4vwnH6zLibW0n9ibuZhskVhI8rlqF

9DTVAlSViPOCLbAtM16j5HhtJdSJJxI1m5z4AuJD5t
o9cbn1JzLD4vj4kO9kRngCSa8CaKtdJTI8AoIazHa+a2PT1kNIDFfOHVgz2SAYsmNJV7v/s5Oj08eIvI

qUTW594Vd3s5bUV2E6uskhKvmNhAabRBeMflUipBa6laapiQpIuuPnCbV5K1L9ggvDVS7rkvFSPjHdzc

MetH6O5duV4pWHL39ibOe5SH1r3SJObcSOgFrAMvlu
CG0H8r/zn6JR7ha1+6UN1Qoz3aIvlQ7QFrGDjQ/GFY2aT9HO1ernQT5wU9ylSgBLepzS34JeMhbAzFLN

od6wtTDJf5T4blyJHMxzTIs2Ieh+IKAbnsjdgfHCJ3XotfYbhqFsGmWdhf06+Zd1GemVdC2/8bF76lDy

rIZs+qiCqyD4+vuF0iXQjBZQD19UPvtLKR4GL4x7KJ
dDqVA4G0TK+6NM8mu+qqqQhWhnBjj38PsIbi3H0WEM3VeyU0es0dj5N0Cfu05Z0aBCb0t9OOkaDcfLvM

EVji70oy/f0C7t2qyrcjkUF4HX+x+t45yoJp1mfsU+dPf55I840e1aVFNPaU32KXs9ljkeh0ZREPCbim

2/oynEJSqtVM/3XZYRVktytZJHhzvnoqz9GRax2TUI
lsgRaMXxTSjrylYTJ4Aoh3qzos6S+psMKTKwbeNktfTKv0QGljDvQXOekHWItZ0MgyTbBBsuvfdY6lAl

mJF1q223sS8gkjBg4i8N1XJP7/jbc2cAYyXvp4Gd/V4U0hf9UNatKra0n2ckLySmmyw5kUU+P0JCBinS

h+xAcVHMyEZ7r7NQzxM5hoGO+ojq+q+fOtjl05Sbos
WH++xxpXg9H30vxxHZO0gI6XHBh91F1oty9g0udwRWvtnKEu7qMuE9juTp+oV6NUdztxzhThtei7jHoV

UeNORkOuN870CTqHpCPGdK0VyYPD8U+cNxBF1qKjRf+hee0L/0d5bDJl2tUfUCzl53Hsgn1+FzMDxqCu

PGTJliaDTbx8kf6nUTXJhV8OoN1u2yEkfjPWBy0u30
Cikmk4HcDEJ5uol+HeFzExqXRyVrk+rUxIjZrCbGG6sEZ9Dsvm1LJSnn3sQGZi8lJimaCoaIXrxDVzqF

D5WvCzJDUv8OeqbpVdpMRqX9mtOezw+odiT1oxUVZVqgtTzgvKml6XQIgA29Pc4wmeqy5nzGHwKIqvof

r5+UJa4x4mCFAPjnvEgUBj9cpMWnCgn/IRHIYOjQ9w
QyhFm9FI++PZK5DGqdbrf++OlWr6y//nj38iqqriH2Bpe9yqOdYvsM5f4lJ55TRfQ9mT5VX95K6VI2qc

E7zV8whL4/ElalELeCC6tvFdOPAGYRV0u3arpJiIZi9Gz8LyHBDvjV8ilUPzwjU5LT6bGyzt9pBKE8hM

9slymoA2sc0Zp2vCT6Ls4UY0Y8i4Pi/5TdB6Pfvjdt
/pSqtXJvBrrWEBpm0DzTE1+xI2ejUnNwggXlzJ2mfOM7zvXRepGCmO8rd7aaXZ07dbUk7c6RsqeUDpSt

r0SlciZkqtEWa+nx6Ev1cDVnXvEnnh5STtXPdaIzxYgnMFV0TOPpLn2uf2Xw/+sr9Gj3rhUT6MkC7hoK

6cVvwJ5lCGNKVr9thGdQdfEBGIMfgMeYxupC9JRQdY
d0hOTO8xWgfxZBVH3c3U0dr/6eqafM7nn3eairXKChR99LIUhQotRoiTEGTTinVX+O39ggmvHwSX4ygk

Os0tBZf1+UczctChyHL34qbMAXJkq2LeV21fUvcwuCO3QiZI3bwNkyaDiKwaL90LlABtsfrWU+uTYf1X

O98Ml9FTakS9cwE0JuyFym3evTA8jELPtqeR4zwkIu
2Dn8hVDWEKf0df5nZNq91/yMz1eYSaTcRpqeqK2Jne/JAe8YQ9HfZiWulv3peDt5QP0rjQMTnQA/mfn3

ejdutmq0Bgj5HF/P9bsIfjPb3kFm8WQxuQyNfiBFGrYfbqUb7eZGy6+vuqtDk1Rk9QlVQPY2SndDI2l1

gzURDURHJRrPMB0B5o0qHsINpTzpGgNw07C6LWau3e
ha0jWVtKX4O3Y6Y7pWN1OF0spkoMFeTCcxT3VREpaI+cXRJbAEJ+/SEkXnqqP2qcW1VmD5moft6IYDqp

LHr5i9I/sgmj/JJQPx6+5lZu7VjY/F4hRrrAMuwUihIq1pv7Hfwu6iFNW2ssvjTV57jqD3wkHlFb3gBO

qrUYADHhe8V+Ilyu6KyoRFZeL+Fus+xzD39NhHw2Fy
PxAo0gEyGwBxCjS7mF2DOKLJOG62tcK7GwsiMQOYzdnPho4BFTGLAqxqCOHPGjoBMW33uAbkU6OkcRvj

cln1KqZV4bGh4qUdzss3OWZ9SvNgqoRSmnoqUYB6VvwGrXh5quFNJU4g0C5dTO3DhBmVJ8wPa0iPoJyE

EjjRfbhfTNWDEg6RsvbIxP4wAOArC5SYooMPVib58k
1UaAV+kRGTRvIJdnTmwPdg/42GT8oXx511b+M9rfB6P77SG0uQS0dGRP6rBF1NLBxS3q5qj0b+RPBSKN

U2NHOAukX+NyKk2Sh8UEz9Teb8vcFNl5ISF9ONrlFMOIS569qpuwxOg7OHXZ+UExSiLdG2x4PMYo9ICh

8407hi6KeC3L6IYEfW9O0pYEChKQ12AOIPeejzhH/3
ByJwShGzDBx8p7VPN0VvO0rKk1Jlk6FcN00uGLO8C9putzK9gMcMikt+XtvfzlUJAunCIBECkOlp+Syh

sMYsNmIfkfrA+9AKoxWOscQIhJJnwHnNueuuMt44rtq80iizewgGfApUgvlq5q8jvQOxkkx+E+xGKJt6

HpSkpOfYyeEKqcRzmk7Bv3IZBNWc7RhZazD8iCawej
9AVC44E1AW8/UPgAZz8AkJZMQ4qqTxwJhZvX8en5w6vkvLJE3zSFk7b0KvBJdQdwDuqhTpSTfCYvr5va

eqkPYTetTrfWDZ2OBRhaHbOmN1V2RkpfbMAfUkgL8U17YP43vXbIocguEQ2chsqt+PF0xe0n42cCNB7a

KTcpt4FtEU38oTt1UdN16c7ouoMr4OpL5CpZJQ6tpz
qZI+dRFSo1japlUiFgK6k+XWNbGiFPlSHamiv538skbyb0SsDJoDZhCowgnykYHAN+fOr6GXtq69LxAa

oD6WF3pyq1gHDr3aTXbpWTti2mymt2jb3a/p5kwTG6PF4+3ybU/MzGVGN1IbqzLJfdbvPcgzndPGkmLB

09W4G74ntp5vbybf+6U34iUhHuxKBJrkXXbSg3yZF3
myXYzoAdt5j0gu9zoYbw47nsqKmtDR3k/HNpImVBr7uZ+lhCBgJdD4d89uVl1LPMb+qHwS+gwmc07UVf

l72bufk1R5UQGxsBeoh/FurcubtO0se9NzsaVxNNirjt0TrGbtuzJ7uoX699FJhwg3ZrHTcF9qUI4Ehb

D6tgB6Cp37z0b20uIOBztDbjac/PHQJ8efSXkYdKf3
MYz4FjhP6HAC4me90/j+P1XIqwzcKnCRfo0xychYkbgDy0NQylQz+W4rUwZPeC/GX3Ttl319h5dgeuX8

nQGkKjJm6m5r7LadrLyC4xuN7xuf1L+W4+2YxCklrz98W5Cg4Ay4VIq563d36Vf1xfZmZO2z24xJ9CNd

xFcb7OqCprTASI8mLetd/UZ8q7H2Q2f/f4+NPqLA8O
l22dT25cQiZDQX7qVpiilL3A64wMMlQPRSLAN4hm+oCGWM9JWCTewEEBsdaDPjqSlnEKFdMjkFrq7wQJ

jivPL+/1j0Ar5WZFpykTRrVNAadbaP0AE8cPl6fRczHeGkmA72Y3MP9WKX3doU6DM/Q3gJXlOuiDxRjH

AZjFtrINbLQMeOJ3z7zmlxnzDbnj3RMvFp9VZ668Dd
WF4m/3PTso5o1Z8MYa7VWp5KlGZAG5ZmFqVInbnMkJNISYx2WOroOKi+4zXHGymIx23bR00owN8OQOHd

zSJlmFiNP/QKPHdGtHwaDcvwdes9Hk1AqT6qUXNR+OkIgovrO/ny061AO+WXcKRQXmJwzpoWmvz+gODM

UCn1T5LUo6tBqiJhbKB/v5LLuwZZnBLZd1PDEZ2mCu
yc9hdyktAgX0heDI5Hr0xHU0YDrdk8jZ5BvTm1Wx+JNdHbhoPg1ncQCL75FEsbq27Gp1OQa/crdNa2a4

gZi78ZV8FbFHMAkiIaMro/4nwHBFaej4sRvOjCBjfDQFw2GAj3wWzsqoQx51X9NFyCVzlOSr8LRpU9p2

jaIZPB2YE+fXBEBCfp++OxnYrXKJ/ujMrR94z3ZoC3
CKhZ04nLTL/gkPy/YsWkTaz01APzy5R7C4dFeMdUU//Pk1M9F9bcIIvb8UV7RpRmjTDBADyFkOtLt1s8

IE0xR81b5z4iBEBOeA3PlGa7wn8NSn3fcoc28xFTQvpx06lpESvylmKjFfPJnPR2Y5x7HkoZ3DKeUqGK

cD75aZjj/eYHsBBmy/30wnaVFCLChkFd1XtyjTUH8K
4L+J/YwVtT5JwMVVIhE2Xk+u9qibhQpHQh65C2nSgd9ZJr869NCNThylCGB160XdydG0Pa+IdQdR5l/Z

7JmoDb6BDJushF2c40unk6o09pT6O3aHb2eE2UjDbCe6NEiMhujwAXVBuoF79izHG7VyKaYgGotvRket

IlTPz7w9J+sHwTS6I7rTw0tFwouBs4SzL5KH8HAGIc
UTxpMYJmPT0SoTZz22Sei1cB0EwmGo3QB1iOxFjlUocWFYH3hSOX4AnrHcna6SPzdudCU2bX/Sl+oL5D

4ae4DpK9Y3wFekMbTZg/L3Q9xj1+yjmTf13gKQf5BStXL4dcT/KOjFkAUPtJfusvT6opVp2oOJZrqu7Y

TuwgVbRbCpx73es9H6QPMmXqIYZng5HITxFk9uTpsP
g7ZoOAnqaOvc3Uo+eet0v6Zhfut+6qF3PV/QAhZh1RO6kxWwnBdA3ZxvutfTv+Cw9MRI10PcBuKI6n9l

6V225wgE3bGAf4gbL6AL1LC+ujY0OBNZzTPe7oxZ8B5NhZVfMboQ7NhXHzKHG/2WFrx36Djjgr+Wp2qT

i6I1+XdpdiCr3Ygvt6SsCMVPzEUOh7GBtlobDtE2W7
7arJdpRC5LLqNIrfg/opDfTnW6wU9fGgjBBuPpY768nBuNLswUc3t4FGgY3pa1u1giDQBayFrCD/zM2W

R/XoRj3vVy2iV4NurrLto4dq8kLOujwLHLAmBAt4XnphhORi3cD8BCjf0hD825nqoR3Ufx02LteMH31g

xBtqOhywtxcb+iuOilZyVTiCHPGJ05qrnT6QALRdni
qHtiVlDLpmmQU1nMrmR3bq62t4lKHTL818oOVBmwpHCO9yjUmBJIEEoh33PU1BNmrexx1Rv+ySlpZvvz

57kE4zdVyJ6CfKqZV5MphhoRo8RmTVHXctgUWOad8jkgYkh0qZl7MBkKqk9FEEpdAeIHRC74BzAfBVUS

kBa65v/pAXxPeu494cqMlyVgfzv0df4h0Siz7Fh78g
L1a0053iC2yqyhtFFvl6GjnxL/P5/6DiW0TlO+sOv4B5CTojOqF2ncbpA73SuKVRl5F7fUmoO979gSjR

p0RtjtAoZiCroxN5Lx5j3xbYTi+eoesab6r425FjHL7XgBDvhVvAH2kS4w75mbMEAH5FODiHWBe+ujpe

zHhd9TMo0vZ4xK97pTZZ9kj0a5wtoadpiOYf5Z5oET
EW4R3/qCTbnAfgeQju6B63ZIHEsX3n4o/T4OyvxRKCdw3ukSltprwHj/644HwMXHlANREPCClwUJVPcV

zKQ/KFuW5KELTmqOFfJFTaVE6bohJaZLoD3atiLAnBQ7o5p+ZqccYZuELhwVAfvwPCiOby60b9rQts4k

tw1Zymo/Bs18CXzht+Il7pW28hBQQn2l/aL90mDjqv
M1kcxZ62zJ2ufBe6CKk2xXChQtTdim4pwLxEolAhjd1BTRWfwR4WjFcNK8a0AavG6Ja+m7y/tgdEUjvl

iPEB4yrSXxQcohcuxJKBDHs1zcgDDGgT2rW/D2kU87Ffy/UxqQLbH6Q+bU8QgDQYMxTnkz4KjrWs5EEQ

MY+Tpi2GO+W1mneMySMVbqkHMZHpY0pnqsnaogu6tt
FvTVGXc3tv5Qy7o7qc061XxrsQJQEOgbtutCg5x+/cnCMnEVTtoYadoYafCQGTpyjtd+d5O8j7MuZehS

e94FHlMj3hmFDoecsQrWCqNI04rn22trhCjm5Lj+ml+N3fkyODEtroQXKvrfKWIPDIjVi3+gW75jPCvp

ZvSvoZtU55tQcM7BFlNm0Q3KXk4YqtW5+h6wj1kItH
4BCUPYhzso3q0qVJ11P/42GbvWgzSvDnMNR98ss0CHb39/AzinF6vOpQaTImbYM1ikZlUiV4J+TDvwfa

mXCZuplFXpe8Ber8e3qjzTWrazPbAzZVMWcoKrHauxe1pTWWqNJSNXJ4hiWFWP/PS+I4HzeddfVfvt9E

Ht0D5atIQ8FUfz9n4jmhcJY5wd2ce8JEOWEyXQVXwn
V/b1Je054fTW37V2ipN7YnVLlLIIsVBHrXCCOcCcOxraV8V0gXc5KlpPY8F/04KZFWh+NLNXfWtZdWjY

Q1xyo/Aix9J92Fy1xUm1/oGMNfEiyG++KurjTlKADskaLITDOtPwL8SO5+OFNfOOz2omhWcw/mfLsn3i

BiVZBK3kaOG4Eiyf4tBAEgMzmcmCbqlWHacfdabFo4
6uEOMJjjNd8Uw48nNxrV9gVJniXO5dWco+f+8B+Z43Pe/CV14Z10E1Z+8uCjReOWDmtd4CLWG54TBJmy

0XjX6pmJ0OsZ3QtMHEotNnvMKjS7jHx8l3aumDK4K2rJLcrIVvdY5Zta88BYJQoOyHG2rTlQ4aNNN8q6

4ZVCivUmOWQYZruijrcczKClHGa3vZC3ynZf1y2BxL
10TNBknI5LcQj4fwFZim8sN43LcLH68tpZ5mQNLGzniv58X7fKICUkVVEDkugV9utsKkHFkGVp/E3ZHY

A6NKZ4RRIQpsfS465XkiDJ75CA/uWDoGxN54z/QPuXwCyzlDxBEBxxV9eLGdp7TOVWpdT1jus0gXjGuB

dAbpKmvONusfAIgkm53BUFusB1K0C5h+VoWq9//Jy7
8xXX4sN4umrJuc2eCXs8q5rKgE2k7h+zPk21vSvyJCsnJ8RwEpHUvUalAPb/id0MBHZI1r5xZ7xgj30q

0I7EfxHGM0IVjoY8r9nU9x7bWVER8fpgbxGPXkX/cf2KIyjuNTkT4fdrdE5wy1XxPxJC+A4R7H38j1Qk

dDfZnu7k7bKKHgyU+zoo04PxUn29GHImDhWerTOwpo
Cp/yJx5kPwA8hgwYLDJcjIx0eje3A++EhCXKlLesmruIfwND5RxwuKIbkJNP8Val7/5nKVt6j3YOOE4t

saHOhP0gqs6U4U61vL8c4zncAKx17deAOdw2AMivkDvOd6RGIB5lRCMK74gHFv7muXUlCHaTt/HzzT3U

o98u7e1JwdHnfFu15k8Y0GBWF0gB+FlFOCbvyzBDiy
NjSYUzyHj0yMy2ItLc1tTfB3Q1qYCwA8oVxCB03uZ7wnVLihf6DTG8j3MS0MV0qI5DU0uHK6d8+tpbth

Tp+HXUV71qMjVNqhF5tVD35n4YU5wHplVHfVcKVoxSweTlFhqdFrXLQXMYYWY6cDHwyTXM+ZlheR5NuF

XJd5reZmxt1ZyPW8/X4cuZ8DwLUm0sWxv2xopP2fLF
Tw01xxd9n3mhsvUdeBgqlgtu6KcXzj+yuqFr9tLAV5PCcNYhKObKXlDbf14Nc0qvlECHD9LtNr64T8rW

rnxULUWvuUtMxRSFyqns7vMC8uFL4z2B5kDXgkj/2WN1PmSFLK509Dk4okBIEblfm3rfVrbZxG56eF/8

7OEbZO9arxzho5pLPsYsZ7TBfW/YDy3+/uXekM/ikV
P7E0669OrXqLrW3N7De0RRcAh5On8e6WFl0GaxzfhiuhZq7zCYUX22zu1/CKCkIt0yLxEq0DBl/Q+lxZ

q0GHh1sce6cUe/bkMso6sqBp0QKCre5TBpbs4LUWIGNn4io03qNaKbe8dvYm16DUAwuavhCRMXSRn0jP

Wt4gwZaZTxQUFLb+L5Cc4Cc9MjiaoGRxq252LSBIE1
1slsCcXJC+M2pblXOVEoqC+U5wo41PWNN58MPEztHP9OJ0WUUBJUVFDOh8RbhHut+QZsskJmudIEyMOa

aVoW06KJfKP7SOms9PicjkaO9TTOdJLHRzV2rH42SDYYY7a1GV91aMpaBevHqT5gk0nwDa1t1AQVsvti

hspag1YK2mzSZD66oVMGSDCsOcxYZsN2uIG1CHePtd
+LV1eEFK3RysGsEk0V1p21aAzGVL39/zKc2/MCfc8MwJZhU9nZbfGoifZSPtmVSqGf7+cP1BYxR1UtSt

Mdq81oKluDLtLucMdPoIkbIK1cm4T9FBE+InMDasCP8Qqp9f7d0aiPKjnklGF26eLSN5+RJ7zPo2Nz5Z

LirdlYcBLR2X3zQH7+mhtcaHeg3A+e8MzcVy4oLbqO
QJA4DUJn6zsanIHk7I1zfsPdoxpKvt9sigiZm3GPI5NDubLUPfJey0UToyXb4Lv8MDWZ6MRE9cew15Dj

EMxkJtHVIH1Usf1FWv4/M4NvrL/Y9kET8becnvdx14OwBw1WK5vtlBgemSnl0m79nsLSmUd7W7pfwKLj

+m48yfqeLcQxRziHTWUiPoH0H12AlHSyaskrUHQqhq
A6y8WPVfw1vzDPOrPtA41fWvDiA7Q2HhjFG1kpKzKqemS5X6x1R0uMHhtskfrNrtl2bv511twshmSR1a

2i0B9UryQRTZgmnDzBTXz3MXlSHO8th3ItneDp71y++QOkbnnfDIxI+IRQKLyC9ThANa5VUYOph00Vr2

eNfDRNy+Sxb4/tGZbDHDDX34D+aERM6GBEXS2/mw0G
iz0h4a5/l+r/NmYKOATJT9WyncoiksHn6MlrsZ6C4oP7zPO8j+aer/ckunAdBEFz7mSs0oe/XMQUXqJc

Kfo8+wDacLthZxXBSCGMhSNimpLIHOZN+xDjIfjvHqW6DA0zQHTAtbEo63sHTeIo0jQ8Onc9GLP2HlPV

KJBioXhqFX/S2zs0hZ8g5e7RAAKs/YU0zpM6hT9ocK
W+aGfgvyQMGgX1yrujyywdcKs+WO9XQCURKwko25qyK8No6eUa2uZBlA3ZyaSDUY8azjYtZzx50E1T4i

QiehzmptyVascL2T44yp2Lfqfmny0PPNkGnfLM6z3klboApqWyf+o3tfnQDt5VLfJ5SjtHMb0R/j2fZC

29Y3FVDOP0h+aYuhC8MG2eiwwcgs7P9wAcx3OGuhZf
JpwLhmeNqH6kbVD3etSVN3tMc1qO2IcPIj61Ry0gd8/LxVECvgtPizBVo5pTgk4kLbJhdf6E7JzKk1so

qbYFUxKc9J//ngYCrhdQEkiz+O9peDzOaGfILNByI1xa5Xnk9nEF0PzbnxstJ7lHijtp6i9FWwIXMhaX

sFvFjTE+MXEvCtPnyAn+1xgG9ZR+ei7dxt9cBl7EMf
CBydpAe26x9NvHYmNrOK5W/CE7PYo8XCUkVY6fGqsJw/mj3eHENcv4jGMRTHN/FdSgHtVhJuQd2DIHM7

5nlMqL8SBnESHXXUceVkxXCjsjWg8R0+Fg/E4d8ZiRwAocHR0CpRTbQ3dKO/dQRX1vDxjqS9gS/u0cm+

lwPklwPsLbHa7pk0CmelDYfz7bmsywM3be6de1GM9O
ldjCRcOK70HtyUCkWxOU6DZkUkgNmjOr3zoLaqiVEbgMslHyLwlvhn0YibPg5euWjTmZY469leNalGQq

B2A1FH7UIHlOeYO44TVTlLuVE3aXIGmBLwgiJ2uMvKyio+PmyHOENV2BuUTax3bjLxIg4EHGVECyCjx+

MNvKhMaTt4UEruT4AXGvytafOjW+Y2Tir5Z6KicYaR
mYKotq/Nh96wQwezXFLCOPc9tKlhwkmUucq6mhwFEcjptPC+7uAvO2MVk9mHqfYEd5esgsU/UQgtbTT/

4EN7BCmlpNc3TbMtzA2AKT6hBjBNikw3uGXPGWO7IYVOtySt/cubPIwgnabDWUxDbgkNrFhevfW7klhT

GnWygA6gr2ulifkFvd9iuAh+9LgYPC225QN2ieeD0Q
XaQs//7l6jq5qbjcEhySvOptlnQzcFt7i3RVo0FuYs6n/rEv5yQGE9A0wgDzamtYhGDvevNVvUm3kYDP

T10MZm6LY3XbCkXRFz8/7XT+3QzvRMKg50O1daHyGoRwPO7zyNSKGy4fu58f0zfrvjfdjilQALE7I7SH

QGxfAXxixnNbntNofvXJaaOMtU0NUTvyaTzFlrVHDH
JCYIYckbj8/uuudZ/u870P+8Mybe6cej910fS1zcT1llod9bnaV5tvvtsGQ9gIP03J/IPo+RAEW3/jSr

aPk/WtgkyYdUdtPT4T+ax4pkUIL2sDL26wlt4ayNJfnHVwY+c0YTqEj9Gyw+ZcUxlSuAFnTB0NdPQXGh

tl9E2xXSpklBnn8YqaTdHxtCBZdiDscdRzKnhl51rj
nwOUZrwNWT79JrCfnp1+smph45Whdlq1Vv9kygsNxNlqhSY++J9ZoNLm8C9SHidEv84iOoY8iWMn6wU3

yehTQCjDU5syQEnRAw2y4TgCaiC650HaFJHbqTV++6ZoB1V1gt29JziaCmUzsGcE/OTDSDPYq+vEkVvr

8WRsm4QWW+1HuBulFeKfwdnyYZNMyPshxUa1s8B6Pb
JV/rNLlWa/L7j8d7GJ6j8iwqGkQxKRIN8LUk9ctQYUT0UD+PlWEjgvr7WWNEwnErZQWKMUNYRAMRRhF9

bhFMNk24QNTFukV9lsFedeepB0joS53o5XZ1+k04cpjlgHHKqt1JOyySKX8Tb1qZjp595MfZZd2RyjA/

19tDFabUMk39w1YtoYw+ro7kBVxT4xxUTe6eBIdxuY
osivZ+TYffkALcjYQP0b+y0vlr2DN5pabXULBMVjI3CRihse4zfzRWYE604cAxPS8/41/UOge6QMJuc6

E6midxgmxLjBWjhvhlRBuI3b4L1yl3sOcyPs0I718/vQ5XJNBUXzsh/Hgr/KNBltPDS28BFDFTwIV70A

ZvRxAjIoig89M1E5ey96AEivxahCYpWYhtR+9ABXXX
4IENwp2DgcnJ2sF6wi//wfP/giIaOzsmR2lPsNoZ9p6YRlL+/zm9vdUO1okMTCIeAVvHFHyHqyk7GP7l

6kKJL3z1TvYBTd8TnCdkCmt2/G2ldtSzd3vxkEhPgzufgIy4YU8v0PUrac7KzdPoAxmMLV8BMKPBORvS

1VQ0RCtYbmjwLVZCqSD2CmBORQRD6BeG40dkKLIbNZ
BJvJ/yO93S8Kbz72+Fz6h5nJKIGiUn7Cm7SA16hpWX/IVVoAn35qdrK9+qru937ep73qvKyfwrXkvMPM

S85d9gIv5pO8r6grbaSeOhg6FOfaM1Ock45FsYAPxfZmth//RljGZhRkcTtRde+xKUMZvDGgWv0lan93

kS/1SvAs5Xv8ylIhoqCyFLeWfwM7yX7qyr43UhUMZD
cW8mQS69i/9nE7tulL8K2MouYoRQWH89YbNDZgijz4hwn72ScFrMHfRCYgp55FjW7/vF4dP9xxCF0FBy

fAoTiiXRTfLylM+MxDsSWCNn2kWnNiBrjXv1bwar+Yc5X8j+NJ2v4pcwaBiJJv+slrmI6YSwiO+wZRnp

RZTuThXfx50V2I/M6FfUK1ZoqDJDN91+9ThTKM4CtT
+dKOTeyqzRLT4zBsoR3IaHxJxkPZUj/QJgSHao18mvDdFrq/j0C1uPbVHDSlvDljwmAvl5hTeWJgLlAo

EzxpUWqWf3yyFwC6PbGrxVB4W/dBTXDZZWOS4pisuZzLQXd6iNluU8d2bvnRbJdOaGt+ShteMivPtaXF

Njtn15eOV7/yhZSPGXBrpiD9ZZkUbN32jCgXk3Z35g
jCZkehRO8JpOeuDW5xE85S7EXJ7Do9Ck7YG9MhLr+EMeooBvi2oV5rEBo7xnbNHYHEWdz+2CWt7LTxmP

8/VyCJZmuVgM8lyrey7HfQrweULPoMm1BzB8AD3E4e8o8t/IVqaEOamM9jbgdKP9PYkNelcStKoVICeX

w8+pJn5YwLPMfTh004LSvn8P90yrWGpklvsknoe26p
07U+VPQMZznkYK0mrSNcHB1z975CI/CIUFyt7lAUq59ajS+Unsl/auqCz/Lr7f/wBcu0Nj2c+2F/QUKC

PWJEVMYvq7+PSlyHc6Wgvxj08ILSCAz9pkyGqYcd54hazBd3VVyWt4u/zg50KlNuUBvalTbQrbdyMIql

ZfytRxQ0XXChc/ucDGVieRUDVHaKKU2b1727fTxItP
Y+E877kdSTe9cQjvzUMFb4zQQEjsHshN3Doq+JrnXNxIV88khUyyCGWagQTJaOhOOgQGU3uSgpX1VKa+

V6W6VyQ0/ThRakYiTOJXRJKnVWGYRu1xsXk/6p5yrdXjA/+IZC8VQhD70XFSgHpm0LHc3uRG13xqSilR

tNh85C2o8zgugn2DaIUp2O79hyHN1NdwC07slg5jxI
B/B17f0LjI1690JVAOc4F65cTQjZwKTlQbl8WEToEPrjLThc4YD3lTA4SpTo+c8GT2OmFybPO6y2Fn9S

gdbdekKaUO1xv/TY7o9E1454LOcY5CH0WKui40FDIA0OVnO33Qahb1UCULU26bBDTfhZVWxcewGitz8b

qLatymCXVOUxmaz+NKWOIvRH5CDtLCHQPbfO6Gvjrf
rSgG7U+AMhHE1bpwtdHA/UMQBEhpU1IRy/1quPmSd+6LdIkqhObvaYZbi3Sn44Tr8NqLH9/7pWpd+Kfy

uTTX9iGPmexwa8jz1RLu1xIdQrrjcPOktWkTwuCQbEMBQCVNR5JV91p/F5rvZIN2SvLw4WAMAweNgCk1

S3ifODASN4Mfk5EUcAjImhUmTieV5lxnCv7Q1fbM/7
tO8h75O1AY25C7nyKuHCXJCkBzYZMP0AL8Jg7VY/5rLxfUhXZG53zxnsSmWE1pFWLKTH+1X3/9Ev4Qg4

iVNYOtnNBxpnhCex3TUpEaWPjCDPrm+eiIEdMsGqR/xrs3IIUetG99lJrDqeQAyTnrmbFDNMXGQf0/tN

3Esg2p5E1kOV21r4BEYb/PoZkzrf/xcjHyMep9+PoF
lkByTgXpr9uR7FRwKhivT9BvhLqpo1lbx/v64CUveHs+3UFk7CjL26D5X2sKvHv753OHHYRg1USolWIM

jtxNyUpsRBuH1HMaJrW5hMyVnkNMtcqKy/ZDPseTmzr7zT7Z5ck4a93L8h7HjiQz05cRW5NdpfE/fgRO

TLouSvgb+9SSflbetta1TqtvwZ4X7RTLgZfICR/1HN
lnixfops8DcTWvh3Y3f8rUQ738A/6zqGsG56OuYKN+3O2oW1wWZgyrk6ym06XPT4hdZteuStuLlEeFwe

N08aTuKhIBmnl717sAQANgKXqyPB4GoUqAML+dL3lVI6gSkB74LT0/a0gynRCj+xyCD5LrIzRXgUigfq

ld+47EwUUNJD8nLgnmPGeGLEOsB1u5z9P+YIXe/+ky
ArjPSp6DHDeHO4m6a6KGTxHVYgBVYtv/wnMCV76xvUEzb+m2UxgiASRVPuDyLuKKcqFqps+vzolSRxl6

W6wMUpk64kqOpP79+oeG5SouqorWO9u9tI2zQ9y7efYSC3HwhpaTQJ27ldy+oWQqVVW5Uy2rhPQpHJ1i

fsUUsG0vdqo1zX576fL5QiUJtej+7y9JwucAATw9N+
cXrRspPF+MNX8LGUQh7QOiurKlzHy1R6mf9N9oDxOl3j6M/Fn8YS0lS+/FOkElrwwdgaY4TN08jpXCY/

hT/yH+5PpnOmp+usOhI3bLZSsk5SdMd+xpr8JUTU3uSccWpL6QyPwdz8Vlj7pptzBot3wltS36PfwIs8

rwGP7pIwn/ye0TL31cBrmWw22p7Cyqu30Bn6CwEd9t
2AnqtsbmBDKcTzlC4mvfLj4lbJ1OgjzOwY7/SWyrR0vtcIIh4hwvcerYOwFpJJXgJzjJB7J5EmEx2U2/

MQsylIF3sb+ahX28t8xCCN2s6fMXQqeIQ8R9U+/suU5Os4ZdkxSdw6mF6RfKXv3KL1UDOV/jMEC23JCk

KbQZEKEAZxayYwxkpAyR0nGEmX7nTQ/Gbykoct8DQr
+u3Qp99Njr0aIrKMoiM2PADcClJ28xR6Ap+sYpQ5w6j5XIxx2NXVviLzeMse9W6bB2x6JT8lNGJK3k9U

gvIcvbJEyFtFGjf7EeNRaWzxA+6ZiCpRSHyrc9IqrTC3fniQ5BJvBRBAHWBSHe+N3xp1FegPqHfm18iH

N54g36V+SXMbYWkoxP0+nWTVofba5lpOTwOecVDdnG
k/dFbH8Ur4aVQBd2TU6W3/7OlCylnJgtCUQKrGCz35waSlPN8kv8ixJXmzrFCWBRbk6CLvQ6UVCBA8MO

uI/aj0jQ8xbKVGFQTw5q766Pc+xcCAOHszVw4NuAgxBPqieFo6gPXsX/8agrxvxDF635Pu/G8++01TUE

AkD6dqH57UNdT7DD7RZvjf/gGBEW857Ds+QpXZakEk
q2SiMgnMyFd4RFB7/ghy19TB+1+p2vEItNEQGHJGYPeOu84bdVX9QFwo7MNLN/xjaKG7sGwYWprxSlkQ

iE0ljQJk+/F8ikQN7g7rR0Y1M+yGZr5Ielc9po3kXXfaGWFdEP06xnAm2iUjHg6zG5Uu3gSEHEHVIOBi

Y1o/ww+j65C4Q9gqry/zWv1cxqszUXrRf9y23c5W0Y
e5ByXqnhw7ZZtTVpvQquF0UrFKnXbZ5O9h5+ougtbOas+pnwAZTix41fBPC2BCh6Z2sDE241UxkOJwmq

VnrVAL3I+mdN/ssg+Kgl/2FXTR8h5rAGa2kl0NYriVqLEhd9pCajUsQa2CGpWw15EWqE+fTgdygjadct

0z6FnyVyenV/gMRQaX9B6WsICDdornI7MvHjWBHrYC
HPQ3idNSX53mG98wIlN/JrfizZ0bvGGVDAL51YzvRlHTonCaCUhjRzvvJ4ujFfiSJqg5XJzg1yoJExmo

cDuO804kXSK8P6uV6su8C/kVTjV+l5HDQ4CytRxo/zxXwNoTa52m/iJe/lte54cnBz98Xtv/uqbOPvrG

fEW+ezUEKHX2EaoczWULPvO2lR0A9mx/LOyrz/ci3N
hVW6ORRSibTYaRhz3DyfN7wjoRsKSIUzBucxtNOyE00LNX1keKbqIPXIvPcH1JxGAUOxV4aXYtQqxu0D

ZqG5Sxqu+o6GWQTeNhc/7nZ55hVk8aJ7TsiggzLb/BqL/1nXh4PU62ob5mGJ7EnbyD19nQktaV50Qcel

6azsdP/apyLNB4p6P7JTM/XUGQ8A1G8RtoHyoACYJl
Nx6etxu/3MfhkgjKoMc5qvs/TDvxNG6g3d8yh7fUMTS/x1ldS2z2h/QxA97NcHCV9dDbeVUGfBEov4db

cKjaVLOGm6tA5KmOllVphRMTyj6SIU0CaGj3xAmKcX87BazOHYCBMHHyIquAWO3SW0PcCD2cSQ/FCspu

4HIXFC8Zv4wMlBaB1EHtDCEIkXMfjx2Ku4mXc34EBS
g2wxHgCR803xiPmGsmF1wdecKHYukW4WWlWnjkeQN5At/XoaCOiTO3qTJLn9PHflpm0CiFsNPP+YvqBa

d5BpxWA+PSjfcyJu6FW162Y2x8H2JxyDQ8MdZD9IxhKGLXSEcPLQkLA45mxqKUO9d+BtUiwH3ENo/hIT

73BMPfxd7fQXSIi6OVN8Z9nuOsg5F2Z5bmy6rKRht1
kdT28lJm3f7q6X80nw/2c3q+BvsdC4ZNCSPPh3xyUmogtfjvV3DS1ne/QP9Cz4zmXE8+t/26lUDNJHkH

4DlLJvHTuxoeaLllnRfgr+pzIgRjwYueCRHSH94AHhUX+npQyQhvcaLcCxhLDRQy43g2l5j7rzLYmyT8

Kaz0gQGUnx99S0Gir8btfz/14RxbbyPIlgr+0DqBl5
Dj3o+fWql2w1uQ69bose5DLWw0NKxN7cWbjg5fRk8Pwh72+j0jRk2ig60ZiyIgl26OunCnDLEBKPaiv7

IxHsi+2x9OyEatGTJFvWMy1mdAS949tCennGKe+Eo0S3x+Ly5IwtF95FBQPqlj+L+vmUfzH0pByluQcz

iawx+sPnUMAFebJruQkWyGPO1vkb79ZvyRasbqfNM8
8zM96RxVraamkGbqGLq+JLmpv2Xxsr+wvn1k3/5W2sb3g3a3FPUHtQ3HFvI8ybtJHvRwWs7X+lVuV4E7

/23C0raVfT2INbfas9cOz09jRNY2izi4ZMo3xFKsUFOzkJx6jd4Bjh8Q7hNvWGxAIEwwPyGEqeqSeGPU

5JsfYekLH/cc2q3kMTYhFSrSwjqe0VFiiB+1qwyZEa
umS1dCrlSIx/cXb8/1u8dQ85llil3LDIaX0G+IHKtwxl0o/4VE3Wo87lyzvXReKmhAwf4dPQvy7P/gS8

35rVqvE0yXJRuiv3M6DzaXMuVRNzLrpSUJ2r9hm78S6dfsx0+uo4tKeazukH91l3HYjsDS0pDAo6rTRN

xQUbk+tddC8/rpc3IqLXc6RGny6zA09mcirBR9TyHM
Vu91gfA5DhmvcQ+YBs6YjfYGx2Sg5TLdrLnxHtkDv5NoVOCTv1c66c5312ROMpJ+nXTv5D2ovB2I3Y41

zigcY6JPl6NUN6yA+xb7I+EhJyptAGpoZeyJsCm2VpECyAHT5XfsDiy3T5OToj1dn0J5d57Zv06xHvrn

R2bxXtgd47zggY3T8iyWbIa2ugcH/mwPPEwPhaamD7
IQMdvjJGLp46tiP5VW6MyZ6l1N24MCjYjATAvUuYRIHEAMEIyneSKCA/xoeE4hryl8ZHcp3aebvvYBxX

pS9RjavF9/0Fyvytgz4V0WgkaS+u6x+cDT1IIs4/3uNG5FRpwjtiW++je2wEZnwb2UcyumhEwxn/6XYH

/p0lqgmzAoGM4LShG0+HupkGhCl8jEbceSvfL/6nYJ
LwwOYDbeF/39Lcz6MTMzKA5CckY/z8e9zNm5Tt4xZtsA9N23FOe03ZUOYmroUKVTLHWyL036fg9x6pl7

GPwXK7ROYKCN9TAiDTAfyGF+bY3L9pmXdN++A+Z12QXwyTBCYoxHaGcqqP66rHpATad/bTR/8vmnBWlb

0+sJt4y1/bW9Qlp690gujkwIifmV/79n5AGi8sai7f
8HqWGjVHOC5tgxlLJin75/LXxvCIItvTwxbuWwD/gAitvk0Ui+ldMB2KamRKqRRJdCFipVRzft8e9CEC

r7cg+77cKUTZHVqh6jvZtEiXQpBznUr6NisGsxdarOknd8yHag8BP+lqlecgzn/XBLx7M97cYxksFMgF

VkEQo+5n6PwaSGD14qvUxeITsflhU0f3kZ3N3/kjyw
jPaR8jT+zLWQ4xOiBDxzCqSa+4fNpgVh9D8VxLFC1aAMfU/uFiR3fvWY7g7F6spjA0y/fxWSMSzTwxze

YSneT0+S+RJ97WO2j7vlko0Ye+6aPyfh6tGl9SLRNAzaBxsdcBoIrh6kSJ6kwP/y06F0Hg0rkueh2Ccs

qIVc1WJxgUsTomNFGLp3JAlxoF2c/C9z1P2OSTVKQ+
KBMV52nnp90napQ1utUy9EXJcNiwZfWbgbV0rf4AtkJunfUAu0PNA3x+MOMKERcsOYsRLiZHKTYhaUtP

bn++3oH2wJKp96fX/VTRBsWQze0S0dVHpBnHV3UNksFrhU75gMcM4PWBNPgjrOVwSZ7u7ZfcZWlgl/vY

2nmmYSeyK66rpoA67sy8AAgjyvUSuf9ZUr9gvaI3NC
32wTwrK7lcUP95WzRNHT4ibGgtCRQoXTOlQfQNwaP/7bRQjZUaveu0b2PO6h7xQQbgmOdnwy97q2XqgO

JYgx3DxByaO66jZBLSyRcHEApEsqDUaVzmJHOBRNQP2f/qJCo1UioFen/MeCWz8cny+NgQIMhw0PRkPQ

dKB7QBSZFeT32yM7CPkrWOg8jml5GuthobSDQeckld
d/61//zgdylnXJaZ+pxQ37PXfXknJ54OY9j6TmpX6h9lBBbC/X7UL0TXnl0LAJ9Qc8Cviq02T4V3gywS

FSh0Ynz6GrHtDe2/TE0+3d6bKC+LmBkUg/x3rqZqNUwRN8A0cgTI8I2L92jmKTaaMM5YY1mPdqAiX8u4

nHZQr8AuUrOfZnLaSBOGPTWIKvVKcSIC1f8yFFKwOj
qrOcrMTAxxNv0513eFdAanGtXfzb+LIvixiM9O8ke9oX389JiTiRrBZ71KgVZm6QdpOTc7Q+ASZC3DLB

bZceprai6we78YgRLoZN9kVjBrnMQ1koU+Ic+F42z2JRBczkQziuIuGPNwBemTFQvMhQnyCgVj37ggR+

1cg+F72KIIHQtRbWzKy+zR6DhEpdep3hh+j18ERfij
QfZTFT8iIhYInobVIr6P474rO8Dk9lo9pwK4N5V3umJ3ma91D2Ly6QPfocAzTStccKZxBglUs+j+2Bxu

CYpyQ7Pc4SNvqtwcz3LvIaed/RBSf2uCFy04XchqbbP6HWl0GaSTiiAHFE7Ygdmq7L7dkbmR6wwKR8Jj

jVTxed4k/Wm8drhoXxu5mODZXBpPncQ2l1Z8wxblZs
fxhTNxGoArGBOYFCE2o0ahd91pWadrVHRXdHJu1Pbb8lrcdCetBClsDTmmX8fkp1opCPotWOwnL39uyc

5lZIGuZlYN4HLb01IOaw2YsaDGlhLLxc/xIRoX8sk3PUywiUyr3BtqJ+nmlsyGQXjForAp0CwzSPjsy8

/4wWA24LPC0pwELESdx6ty2KvbBt8wHNzguffXrcbh
TkRBS8czCGC4zdM2goz8mdh7LMu3HxrawFfgaFtSeBtMTriIA/wK4/Ne4tEesRdo0QS/3fEx2rPunH12

uyzlONgO2kpFjngAfLBP23jhR7uxuYhB14BOuOJDXKM+OQxxnKq5IMtwosOi+iJDZ0w+l4UNN7PAqmN4

h/q1OgzcswvHkCmxsZlygjnklK0w1IW2bZkhFH4Zld
G4uawtVGQ/nbVRVNMUphYMMksnO9njpgQySJ63rGP0Mfb56OJzrBJrpEVTxMQEA1Sf+JEvrq0p+TcUwu

S6xUh3sL/yg/7/yTkpFR6K4bK/QvSXiKzrvagTDhmsq7aT/do2mkUmu2/RxrUoTclJyoUo9tm65JTLqw

fUQaOuBI8wMMFF4LA6WE5laJRKYk4EnRPI+/tIQeUU
NH9vMNTeFpe9OY9Qr7pum/Jbi1AhLtgQlbu23wNmvZM1KuUQLf1z6HFgOy7+uSobGta3g5jHErROwBqd

mimS6cnEpgmPPdq1vw5Ti/UY0Q/wkIFFyHhWx9h5QEdkrYCAujIvCZrodpbj4jeuAZCR0bbP4LZNVihT

WN7Qce2bAmDwZzU29yEGpilbUoD6Kx8ZS7NyWYeGoV
fS+1NhrRZ6ra0vsPTF3BZZSaBy96yxrF1Mvx9D99KrSCvJXSmir1SAuiVZAj3R0z7eolr3DYXIJ316Ah

IVaz/TBR5RKmwFGjB915UUpiC8yCXeeNLcaYbNpB20nGxcBDLqvKV+rMNyPAjeMQ4P8SmdOZAy8Czsuu

MKJkEoGnZJvtSu5eVsG1Xl3mGvyUUBTSlsSMVnLjJF
lFHwzJL6P33mDiH2flt3yHwvBQrxzLpEkMXuREQAKTxYBQi7iPGbYrIczEk3krS5navvjoSp33EFrA84

XXW1KF59+dq/y03GP+zVbn+l4n44gkB0/mQ412UDCa5mBJlkT2nIbUChlEFVoXO2tIo6oRBcBg/HH2tw

Sd5+semgcfzEz+/rhDY4CGijyFNqEtPRCuegFyz6lP
qvWn+X93HW9zSgHTmYsgQbZiUC1t9Xdgx+INtMxGhS5B6spCOMtEfQWhRD1jkgzgEQAf9M1IOlURJChn

+rkC60Ykkzd1WLOfLBVV9IdSD4uE7BXCIS9Ey8lTh6g2EOOW6BumoY53yxtJchk8R7PkXeNqMLGS7cLW

CZu5L0cZyST1E7hMe96CLr1VRiqUZksUkLIcC+tsk5
zNxI5D1GuheWvb+4U10LBdWW9ckmYywjgX6thU68vYgVBO7wLK0Exo6ftZqd151wLsYYjjBxp+5olmjf

C+xscqgqd/+799CVVbcVfKHIZ4IgoC+MrcBd5rwz3X0h6B9lvGqXp9CLSFEoU+SHIKL2J9jdNko98CWP

foRHy393tw+dRMppPwomMtaJK3bYyFG0CMCNILJ4Ew
wuZ2fIAPUgRi79H/nzgm9vh3b8+s34LjAlf8OFiCSBC29Q7Rvzmf4/gv8KxusfVdlhW+kh0OkWRdEc1f

gEIjsk5SXE13Y0T4hQiy5ODW1YLOKMQANO37Gkl7N+h4WNIoaZ+tADQo7fXtwA7KJabAaI8hJnMpZGWN

N6On8PjJakr03MZpeXloWts5xlpDnd7TH/5Iy4ya5E
1Q7bfp4nXOgoYNlsc3G6Z8xZ+nu1s3F5KIKbjavzx2+zihfNwsdXeFYHVoyI9cm8pbWVL3DxvHXbPJvh

XYsW+BP7O7732Sced79AK5apdJsQyqyVLIkJ4cy7FZkLP0TRJRgcz6lxSwILNntwb1CzOHIeOWgYpZho

p8iz8NssXMNaLvnqNYCk1+/9MTgU8A362A9yl2FoEP
+n0tGs0MjQ6ZtbtgVx++NIDhl9W/mChzZeuLtYZ16UrvVpcNhBlNni+q7VCoN8KkokYjF5GNjSY/ZiCz

3Sg6cM2b61kQgo8JokQF8BglqZdHV0olBbRu97s1N70V5hIEClDDw8QfiTEUtzgkzV8JpeBBZaG7QxVw

cyxfRyeuSaNw66ZAT9fJa+6IeKvav4fsuaSROy7DaR
vifDX7AeiRX0MHWN8Qvl1s8Tqo4Gf8mZKCY0mSJErsIxhgFBq0FFSWlo7kW9UyGu8U3DpXJqYyw//9Jf

P6dgetHV9hKwTfjJQjZo58mfEX9BchJ07C9YixOemCW0f/ZQsLup75SwHcn6xfS8plTtjKszqoCN1cLg

nFEZkVKotP843OPB6/YnYvYVCkOM1mNK4Mk7NvzxnF
tVXrZWAjyvZUEyEASlmVDWI2srXALKIq9Tjk+7sC99CBIPID7cEc3jbfZ4bRLUiruDoukv+V4VXMhME2

RF4EfUS8ETMzPCWh85pWBvVxGrkjj/GFE51fKbmBJtUXghAdG2mXGE0p4pQjRuw8AJUw/5WFI9tc0sJK

CCdDWxYMbJ4MLJreuzisg5QWOGPVRXVMTwmpHNq3dI
3m4pVMfIz8FGcVhNccYMSFXo6e2io4FhsQcqcfLsZoZBPags/syBA2W+O+8LR7pRSVKBTbJnWCv/O45t

0NmtVK6vYj8mdLGsU5hlYrSlqhKZhJ2lL4FXoy53EFedW9Klr45W+Oqi3DMpEiKv7W6wD/pu13u5IQmf

3t2xVHfxWDRrRFGB7h9T2zJLt/kHLdQbkG4VK4iVL2
scNPHBduxjaU4NScIzDMcWa8QLAtBU014yMrh4HyUIy+ugy068QQhQcbvf6xdeSWmUu6xW2bPHyjkFgj

3oQWXUamTNeupQxF1VdIu/J9OeWuVYr18x7M6enlWxgDd8PioG4Tv+E5Xwdy0UlYffnAklVNAF7U2xtr

Aj8wAcInRIs3pkQ1+pWfDjC/cR11xETWhVeFbkTe2R
R4SXRhpRfm2Rqdz/AOqyXmOCAqm46qlhzV5hAbkaTumQ7xnjU+9EZp3lVRaFPpdVcYe+Z3KOXSXQqoXw

guMKXaRz3gttd/dWw1DwSIoMyOq0NruzVPUOb2LSm2ZXh89SZLoHaKi3N05/bM9rL9SwUfKISAteKE1R

lmuTqgnWUYY7OQ4Dt3Eo6KYVvxJbzJjWJ4TJ1Y1ptI
YCp8vN6uFxaSlHpHiRx8TdNHQpyJ49jw9IFsBAWvURWjTsfgs5jwbBhYdqimcqlNeaFVcT8nz5o9UZo0

5cPThc6DBBkkcJpqH+IR8ZZPgus42yobYm2KRzFK8cPkLoOlNZoTCz6NYtOl+YuE7FuFQ2pDJVtjyxJS

hQ01XiK3u756cyiAM4ucAmh1ywNZXB/8HjRLvMgC+f
jafRdex4YMu10+FFBp1rER+IPOOSobHBASEX6pHgv/jm4EURhk7nJCWBhq2U3vzzDbNytDZerF5MIb80

ZahvrSk96G7sZ4lNqF80auQRpCCM7KrKy3A4GK8TTO7APE3YJkqB92Ia4sx+87Yx6rvzKrnNgl+qfKNz

qWPKXNQ+thK0pY+7stmBzGjLNRTuSnS1gYGj8Rqe+o
ygSJLrpPI8pAAdcxZ7vqocuKIN1obSGuPkJrPLGBya7+gvzlTmfLeO1B+JS4BQqDfnFtTziwYl1snwzX

rYgozcmlY77/mj17hIZkoPF7TGr0GJz0p95KlmQ4GnCvTTksWXhTB1FCkfAMk2OgFhgdmWtMP7ROADPC

ornmHTG9/DBWgVa1oWtJcgFUF4F1dfwjyMDcDooJ6u
J/FK/IW9G1SJZStDfk4gfVZNVXs6SBgslak5IjKiS1a0leEqQR79V6MIlPZqGHTqvwppvRY6lTqlfzdR

fQB+TLm5ohuuHgvCH1qLdRGXngLKJa6wlOJu7CBz4VyW7mubgUbZXH7M26pBD8l43X/3MCRDI17SJuVK

aNLz1W502xdfqjBAyS+in3p9759Tjs6H9HUWTKMDD9
V4V0zyAXa1QyTj70kpOFQu9zrOxstWDwRuYohrEp2Xj8F/a+UbFqCv/PSHZCpb8tLTFsNiWCs14cPzof

owAAWo0ETQQiCpTucdRSaypoBJEdmetdUJNCSJtrvgzv6leHIkthA8A+EKRq58gR+45K54VIqXQJcavu

chkAYi+s5PFA+1Cqny31grjSXtrsUjMKkjGSMh0y2p
cDkG479HIVxdBeMhrJs0tjazVBX4aExox6k+jT24QlErOc3uUZ2+2k6Ccj25Xc96jv2v+AwqtKD0R4f7

Qnrgu3V26HVBzRDJ8/m9Tw9bAaE98AQT4BR16z3i8zqkhIA9hQ0rZn6nBLiq7Z2fDREt+HMmJSiNGV2b

WRZ4rIYafJ+37lZzqVJ1PUeIb5UmbqV0xyzS3j7JQ+
NNTdhNvdvi/U8JpBj4OBblgk8mcbuhU5kK2Amoav3tbPGdlVnkB5J927RldTT3WPGq4xanU854tpR0sU

On1xAdqhCEqgs9XixYMEFBH47JMUx8Q/gJTm6UlL4XeNitlPWL8uMhwmvpPjyWh7f7lHQI48tp4YM3D3

OpxbIUz5MbE4OPNdxsv5+vw13/dYUtBP91YHwWMG/5
GLzhqBxNiJHfOSe8yaX/nvr7ptldkktsndeMqvaNFm5lWb4U3EIQcq+/Kq2oxrBMo/akpwaDOlPCB0gi

jZvaZ1xvRMi8MOZPVID+OSLLkpI3SGCy3PlcJ0VUO7pRpoFvkNPkWcTrHl28cTNzCMIX7BXzSuzRurET

7oo75GE/ixtsLs+EDXU1TcBmB7Hw4RrbLjEXeuEA/7
5215i3bF6nmCXHwxWq8d9ihG70L0HmIuEKxs3z0RoowjukCzC2anj61opuTwirijRTiKPJ1aJkMAUQS1

++qD99akV4ilxGhaGHSlJIB47xLpfVx8cBFIKiCXETJhIKRA3GESANAkmk4vUxKhpuUIUX24OCLP+qvQ

cl3g1kOQEVWXZBdGihYb84+WSs6h9+nHj23cYxgCvd
Jl1/OmsHcpSlGGcBBaN2pXtcpMvXvouNE9LWaQEi/PNNy639C7wdP30FOUq4N/hi/ZCq4Fb+ShyfGSYh

kO6J/4D1v94QCW/GfmkGoK+3pXAvME69Ch9WV+1kQVatXy7/SNUTL/Gut+EcubA72vgpFw66tPytuKnh

jT2dWQ01a/yp2B2CmhdI5M+tkJw1LjtqVJ9giUaKkn
d9+xs3N/aGF96BDXZJZjVsxoTOFGk2osjdn7nuVbLB9j9CwBO/YMCeqqgQGHrOQyB+dCieF5te7p6s9t

2Jnn80xGARJhDGRIuQqddc1GHx9tQ6ybbgaLav1WHWvkvIxg/8Z4d3Myans0uCSVcLkBBPGIBfivnfQL

EjJKUocu7cYWrnLZyNtPCxq5Dbc3leIurmqEi17A4c
xwyItDcCzFGOO+sjMG6spYKm1j7R90QHRbJdzuKXg1djUsGSQgom/2dEa6OFyBgB15qnhLqSJmxPx0jW

9+7jwPccV/1spK/t1f9C6LZc4JYQFSYlF5aQu1SCOc3GhZCwiPzbDMWo2IFR12JWQ4P1j6dyDK6uBl43

CCmnDZUyIc4MGccc+RD0Xwvfdj4/+6BbtbFgyaqcSe
zPnrb1dh36jR/t5iyIo9Eht/Pkw/MVHuPvE4TkD8MdPERkmSc0Tn3u294e38uwa8bcVzcpAqR4wMgIOT

LH1eCXD4OFq5tabUa9nLertFGXEtL/DitlRfYmQsscqin98paejk65L/8TrpaRULwR+p0WUWlUxWvwRb

rzoAb4g9H6K4Asp0oz7J5OtZL69IGfK96js79t03io
ojh3mto327xszX2ckHY6uV2Jg1fYy7jmR1lpVjW2/UXc5fj768ibWKK4Y4n1d8ui1fK6W8b7ObVBiWa6

gs44zS2ytKo6gJDcwrDc9JrQP9WiB2/WCaZdjgBmhsR5BFuWT2T0VeyzxskhU30dWssVzNPhrLit54OK

upJWHiSynZcDJ9NYDpG978kGNBAH1OV8fWJVIk4Y01
+0Puda1BBZaj4rzO3Aa2w90G74aCkO1sM1rnom6c+Q2GtdZk1UCOp8dtWX02NqqIeqCw6URePMvIN+d2

sEyOqJ812sjqnJIwgvvlQY7MSy1mVzdXROjpUzmbDImxAurjEgWGhQ0uHEcVveq8HMlnW9pubEbcQXeh

8FfJFxWHv03Jse39XZBqzn+OexuGdVXWRW3Y7J0XdP
dIosvBnqy5Uhdl/0oG7I1YyQxiG+Fw2hGxMAPc1CuacZcodbm5YEX6qOXIrVHXf7Qb6oEfvUetiHf74g

KGuFUMB3xjYpHfThCj2JaRiP9PGVaudC7dZ06AVaXXuyWnRa/+iiuy4Y1wyquEWgm+18nR+upJ8T/c6e

432G6UUDQFDjq+Mt+35unXoJ7YlG3B28caEGeA4gtj
z8SNF0tzlFrQctcjBOP6a+US/QfHDucLNgriacTe/ITf2mu0kVRYGy7XpZPZfjaWNJBy46Lc15aDMstx

GInROuUKEOCu1CZ4Y9Ga84cZAac1QLu/z3O0tQ6IWX/hU2iUkcXsy196nWC6G+x8WSQJq3wB39LC/Aay

Qk7NSZULjqiLuahH07HPRezTX/83z6bptg4vXlvmPT
bY4A7T8UBMPDW8wZuTBmLUMYOgbM6XALfh7Z9vBXunou+LvXyAtXDuObqqR2mTAuXR6OHzQIpVRdAS0j

8AZzOCpECBFmFZqRBo39rNL8Q53vNXDaFdR3kc7TkCs2h9WebguRxU7bY5AiNXDNxkWGGvTJ/IShDkVv

i2yoMSqOuFjct0TtRCrdOt3I43Cacnokc1gYBIi4Rs
u54i47bCVimjB5bQm/EcVLsIQWd8JK6q7+zSWLOrRMItFcbXNMGe3dkAI6EEs7VPG5tFG6ZoNpaKPka8

CDQMWt5CLJgfEoABSzNHE5Lm50px2sPSGrjfvcG8sVQrKLBhOWqqb5hJTuQ2Hb4iLym4P0ya91JzzBlv

Osbb8lYZRyl/64+J9AGfEYa80rkVSRHPaClNVbv/z0
X8xBFH6TnuCF5h8kRMKXC0Z66ooH6q9ZWi4qc0cMykcT1wmydKFkjhXRcc0MMZS9oScgb4jjE2vpxaD4

rEqcxUA/KUQwNIair1DnddwL3dgYpvvw4SITksJuBb1poEtOsWI5VxmGyF3cSceNqOuUlWJ9nxs6Q5hb

t6+HvQv0whTuIOot+KS9RuOfUdkVzd4LG+andlgd3o
ZGN6qKKEHkY3ohOeFhmh/ZuMGr/7z6U4MPtkKuub7ySMnLSGHLfb2sp79oj6/WsxYpMjl6mSQG/N5/d8

XQ2GZ429OhSTBJ6/PCdd0eCjaCpU9zqGLNYujVUykTSrdfmKL9vBaPevlqxHIhlcdZSrgz/iaTpgeHtz

Ai2knWRunTZOXeJTBIBnR3aYLJzvjH/5sKNSPVbC49
EJ8QwfyFJ6TWlXVwifRX5iphVB5EhOMffL5EBjBRyWAFLlc5P2KyC5Yi91jBnCpbpxPHEVpQJklu6gNC

e5JPWeZknMsdSAFnYNmQpOipIVlbBvw6i908btny4Vaen8dKiZSAnMgI3jx/saQ8PD8NqoMAFCYiXN0C

wzwv7V47sR4Ugxj6eulU4fKUp3Ui3hRbkmbU+bt7/W
Qzqc3bEyQqkFLHeiKHTgrQKlOcr34UtKROG8/Q2KujiMT2gOGSQ8eXHOAF6ZzWFwFT4+Rnf8oArFRqX3

h1gm63500Eqmzpl2Rm/gCn9c4MbLr4UVVt2+dTTwnG3OXHqjqIwHtvlFA02S9Bk5V2mocdT0FSI0mCy9

LKH/E4f7ctr9HyLBZ5YmHb5Fe/G0Dm9zvNtWVEZZOo
lVBTpIC9eCB9lqL2O00ctMOhhtXgS5tIy25edFSSWs5c5/d+XO7F4303nM7bWCsdWXv6Azi3QG/tioY7

NcX0rZqEIvWveAGn6anm/h2IVdxelhL/0hx422PeHhfQEmX25y5I4T0bH8mRnTf79qtbUDVtuaCPtH1Q

1tQ2/uYowJ9ONNe7VIukai0X04cps0QKS2s9YeHueJ
dC+g8cff38rmAGFbf773bdCsDwrIRYmkAEn30PafwKtVgCM4RjsZf0xkp1NiOYhCqC8HG1ysWl/c2SF/

oeWbK+Ts+YR729YccAZzl2MqeIWhd4WlX7feE1ykgPUPnzWbhk9x8raOTzHOMFi050IszhSrTBD4OfZa

l9wkuJCm2crMahHuJGKBeOPE9jkoJQ0+IM4JfF5A48
3Y7tY6PANVYPpvvObAb+E2f87QwVPuZlaHB3iR7FxHiKwDN0uvrRW+YPqi5Em2z5naqDQczXtZmFBsFG

ujvNqiCPz3inoZbMpW8YUYWWWvjTPwms6R1ZSa9egAu7IdOzi2oc8xK0DywYn9B4/gaanjxQPKvzGehh

J33pfaQnnseWG+pCx+8pmGDy1DK6+FZlCmb64EonEp
5IXIBzf1K3PgN1eZNqB5cdFWwpz9yUu7LJw3xPxB3cBZU8jTF1moS2O94zg5PenHwESpycOJ8xTVusyL

rrjcjV+EX6gf6ZyuOhRw6lekm08DDzn4IhWzPrhA5t9XKI/TWw1fLBIJtw5Ol7edoaj6jqKKtCIL1FVI

RGX4fjds0WE9PTfO5kjBWChanb1o6M7yB2a5uqZlZc
LGMaEbSF4bCczHw/0cC0qwHKr3r9eE1zFEEG755NL9PFNsROeqDVFm3mYh6AEoBe3QLWXMIGl0zPG9kF

+VaROe24x407RE/tGSuaOffuz1Bha3aKXQFth5S0CLsYJKYmI1j8oJsItJ6EU9sQTiQnWSROu9OiwbFR

l+fDFLiF5FPcHsmPuPb5tBI/oF4wTaMVcevo6oCQZX
VaEuuKkM9b8yfnAQB7gqQ0U3fKLVvEZR1a3T2tdL+oPdiJhuEf3KbzTEgyvp4HqKe9oA80vzzG1oipAR

yC8qkqN/0FwmDgMs/4OvCzn4hDjlTyzG2x23rvLhUqhrp0nlh4ADwoThEx3lmd6CGjJna+S5fkG6sTC6

zUATjDLRZt+dzNUKuGlseEL8rHTPhMmUUfAgQUiv4/
vGdj/bI1DxezaY5GQkPyW/VfL7hvKrHhsff5y8yl2wXDO4b7i9FD28j3I0+QSAxNnsI3H9i2QkPrljUs

cTwcy98J0wg+GTNW4Devu17cPb5jQCiS7VYe4bdK5b2z9DJs+dVjME2bYCrmQOIs2r29GYz5uwej4zeK

N03iNtEESpTpnyuSMCfLOG+uCceOKMoVziuQFBdLzs
WdvdLN0k65hxk/Mu95p/MFDf5K3RjqS8pCEmw85I7scyHkz49Sd2cj4+Prduby+yiTdF31XmkTFbEXVD

LxBRVXink7CtfbCIFn0WIDZhTK7kQjoftAPWYWRGYerCIxrlbnYNIUzdEqxWsqgVMszEWtpYVwf7PV5r

71c2ZivUNyx697k8VmAoQc/XNPZzQPwK62wtxwkU7S
2lCUCkvggsKgI9ddckOqVr1fUsLuhTcNP6BwDt2bizvy27ZD2xBU+BsgxcKbkv/UCuZsDmGrIAnUovQI

gUnjAFgtVlJFlY9PkqVXQ24z635t9G+JtKjnUSNdHfVkzYuhIX+qs245wNDhBNenF+YXxs+F/I3cO8oh

5Lh9OANauVHNLyZdABBQi5bZtTleaxjjav45NI0zrG
lYWY10gEw0SisFs1ztPfW6ARxE7FSbWn33/3yIJE0i9wEaI2gX5sAOpH9N6zce5IgSj0axo8bmpyhCId

J1+Cjyd+75AGM0Z+nrB2wprVjolxeCgURHATgZ22IhZWf1MPhjWgwx95jI/nsDJG2/gNG3hvME0eLK2C

bm4SZgAlrrXhU1VbDifW52JfNFPwt76/qZBEpt49RW
6pW0GVwNrZ2X8fECoo/zUvA1PU8VI2kVt51tq/vCdLUIgdZA7sQXi0R3V+vrUSh0hgfqh73nmLclfHIB

5C9HiTRU3l7jjLLClNqXe/T40UGG7vk0WkQ/dKmG2/XW8alRSMho/PU0+AqHOlhnHfSsN5/P8NyBk9DG

w/DQqpHbauuN/BwhwN9xgnst9jhCaeF81cgRpvl8Mc
TPvYSgtWARs1s/U2+3XEqe57MCSf6lB45DloiJpITTTIOno5rWyum/l0tD/5pM54o7Ot8+OC5GsZPboo

2QiiT6HV25QpK6IeykvUI3m7fZEL0I1QHUHU/n8+PLs50W/vRllN9Td98eLbZJ5MZL5mnz084dt0dceP

vI3kR+Z+ARfH+zv9siTuh2P1xlj2nu0AruV+uvra4j
pOQTt6I0XKe32pmOOInDqYBkz4QWVNymiMWB1JsC/sal89mpi3uFb1LTbot615W3xqayvoXvOLDrp5IU

XONOjip9+aLgYdG5E6+nTpGb93/Ju2ExHRh4Aaf0DJsSySWmm3Aat+ehuHBsltxJixWzR7DfV70VdMCD

fAds9Fv69rkbXinw4r/lxghSxFLHoC+rlhQ91gLZcd
1CqT4hSvOfoLA1z5LlOoR1jaik3w8yTbwt1AqSqT9wXleGsGEJsuSskMfE82ABsdxCdgsYIOqMceZKIj

YFSpksRgYU7luevP30xSV5iEQ0ZdzmLgXxLat0ugJXE9pJApjFMgMJ0AytbqBnEky/pf021fTxYrIFXq

REQbdFQeIo686fDhhrgGEEOCJ+qlygqS6XtLM3zHXv
wUMow1UQBl/0h9EULSeNXhmgAbuN7sWlRA5Qk3ja2hYaHD2O7+HG5FYnYHEdFwS1mxW2W2o7CnR6Avkv

SnQ2fKDM/93Pup8+tTnUTKAxHbhF6XhJGVjs1EjcGpXY29rhHLnWFDqVQQo0Ys1Dszl0HLWoofxONsKD

R0KA+26VlVn1jFyAgsHGL6wgc3sGtQDltbiK6J+8L2
U7SbhVF8mZWdoAG32NkS3nFj8pDYPRRlizctOba6H9lZ52K0HL06/o86q/l6oWWumzZKy/g2872ut+Pi

DvaQFGI+piM3ctiAVzSZgEjb1n9qk0fjzIAqAgximtIbOBXbDLGd1feq3W2hpLE5jAO6+pKpoLj0uqjL

ldkPEa53eYoHnOR0VkOIj7KrDD8uF8C5Psz87lR6wH
+ZhkkOS/k6SuOYvT1CSPElnolCIWVc4Etterf3fEfo6qkoWnW9EoM8YavRTJRkIwbmm0d9GBaPWwvagt

keI6jiAWzUAf/A1UMykOgFgDHjGPS3sfxjwGNUMm1Vyk6nmwCf+Qa1Fi2HjqmE6K5f96nc/XwHJJMiOA

/+ZuksP/QuZs1ggVYx7iO8MuQ3wU44zw+2XI3lb8q0
F8KD0kWUpn+FA3iyNocYOf3kCd+L4jDnF5/G8xk3pzx/HtlDuN/8IG/K29y5D09bnPONFNVM7/sZLfTZ

soesORYz4x/dPd4fnec057+N7t5ULRwIkFCGRB576wCL4vrThuwgFjjawKY0X6w/RYCNoqJnSQYL4NcI

8GW0oVVYGFtfC6QRysfF+1tiyO+f6o0M09Wlvms+Chi
ID5xTV/KpBguwWvq/NhrZ9heJrF6hLP5f8+Z36+gwXXzrrFVRsE6j+wA46KYcMwBSH3pov14brvObZWA

e/7zVHhar4y7CaT8BhpgFtAh4olHfR1aHXLuaBLWsMnmvRWdE6MAGu7ydrcdL3Ty+ECRiwFRlgtO1eBw

hhvzsMSGd2oTxXotETKMPFDjK66HQ6usdYWILTUOhg
By162xEgj8dMb3TvpFlFcplSrc7NlKi/0cvQKNxLRztI/86v9Apbn8XHVx977WbC8Bwo2Q0rh1/LFhxA

wvt29Z+OltYotM5kj7Ew1Zc5E/yTxdRSK+nAwOsfTpK5ijm+Q9t3QclFZ2VkljY2cSmlBnptsOpynVfz

Xr+jafeIa/sqhuMiZ5eICxGnLYmLxJoYGqXjcHr1Cg
9vgGCQRBn+rWWnPzmtNPvaGUZGQPUuBpYigQzn7A1g9itBOCxSyTUnwwED1mFj21U7jMyLK6ys3/iy8L

Y6aj/BkTKK18ds4RxhksfCL0WDaaP2N44tXC4AXfPptR2uNCzFntjJl9J84UOAnghz9islzRsnmbnrZA

LHJZDC/XK1WFnJ0ZQmMZzC4GOyPpenGUtJcEpi2Ojv
2WV8IK69aFgEugLNhrVPm3EwqN0U+BlVVYTUFxSnKX5vsxs8K5xTG0c8wWHc+Kd8Yxk0pqHAC0AbHLh+

xGYMZ5Ucmti2tuTYXfpeYe/aSsax12VhuWxr7QDqokW3TDbIsL+p1lff2Ab3ErmoCfONkvTc6zBlAive

U/P/tWyIHfOrBWRvAEfl6Nf66Qa5hD2UH3oEkVH0da
LnYBTKVUIofKLNAj7nJ1LLKIov/0Sx4LHrGg71oAiz6flYNMCGj4Q9l1hd2YJglfuDqQfWvTqA1cD06b

C9klW1qfhSmB2cL+4FLkgQqHenhF6KvXQMfxacDK1sSnzhXV+hpckkbdv3c9BwH6dT24jMVLkbwvwb7v

qOXjImTuMPEt2STjhYiyQiOXs9JjCxXMDhc8TZ4/ol
ykY7MmlMGY0hZegVGa/NTJbtAx3TRw7l3MZCLWlP5+espSNpDuuWWSkAtGXtbAX5K3WCatuiE2GuUMg7

bM+ndUq9A+hPS1hEpiB6uNxooeUz2+wQutm43Ec/X9riKKQwHj8Q7KQVrJUD7TCMqv+b2OTWYQLun91O

p6ceVP1p0Xm0Ctar2PWjm+36ZR/Jf5wdFBQHXhr3ea
KYofSuLaTt+7a/TQlbv/v4D8tClqpGTjH3iN+G0Q2t0PKPIcBFVHLcdmdjGYkziHiy2JEtt4AGueWwI7

1C4eNXI+DDcXqNCH6VkTmL8U+OHw4wXRWPfGO39GiyJGs0K6fEPppTsrlYud/BVKm/ZWKeBhCa0v4F8v

J21iYYUCOztNM79ICn0g5BV2qDdm5TSHlsJyMx2+9l
aIJS3wgoEnE8I5E5LLeEU2Srmf6kz0N1MezdQRGAaUUNrL8LDC2HoKKUGnXq1R/Rc4tLWlzfH4kQq3T3

VMG2L40sb3ECeu5VM4J9yZG6e2w7QPhE4Ni/Y6h/25aQ0XxDBEkVCjNA9BCBNiOP6sXGl/XLeT8ewiaS

1s0b9xiDCKL2XytDxGfc03qxs+rgZmvLhIByTd/Ot3
xftDJi9VN/HnRwRgKoA/5JVmB52tMybZrBpIJsQJfdgSK74LoBz1so94hBpaNNIGQadGQl0e7DIYW5GR

+CiLQkdO5Vpzgxe8akuiUUqeB5U5wx9ZGto6fm5dlBeIpSPQ4681Vp+cSMMSIVu9r602DysGPmG7ZVgP

Shonda+LIMey0OxvP9v2ykAhbvuC4A5/B1eUGkJaoDPj
3mf4sAWWA1ngyHcqkfRCrh9rxHKnkaEKHp2yVO/3D8+FfHfvgs60mma+21p+olVjDYRPtR5/6T8SKJGV

Swl8iUOW+/k2hVDhLmbrT0R7o1PNFQ7C/eVZcH0KhaIAGbk1VA8gImZjH9wFdk5fPeaWMzMePseUqW0g

iaA5uPFp4Mx0Z3WeKbzlrm4cg2Iiz00fsgqd08nwaL
NqnxR9lIVVFh/FbrbI7uTUPL2P5du0I5yBc7JSK5OKgHEWBZRfdYS3JTBFay1BDCQ8DPd7Vfj4RbB/BV

MD4T27byEc3U9eSIDwWSjF6pdVzmw+036SGYaGp1gD39rk3/sIgFbR8h6QkFVE8+J2ItKD3v301JXEFt

DztoYlEVUzpgB9vugYglbJodlEm/SwvPAf8KIAnevW
KX3/F3Nv+FJ0J08RS1UdZwS6OwrRIL9K7PMfqUPKXJOZReETWrxDKKLRA8g6XR50gBIOAYa7kit7NCcu

x0x09zmzM19q34ad9t6Pun36gcP8B67uihEUqjJH2wakuqMRr6/+DLeGo0E0jETEzIvswiuj+C//wItQ

QWdYQzIjEZUolmdCDDUFyu0Ap3nHan3XEbMHqhWHAn
4Al2JC+A56dHfqHOm8PcFqJ1phqyxdH+iosJehJTU1xPkfgeAH1UEQqO2R7+Oj9jAN7A/TYelRn4DSGe

1HAjn+HXGtGFzndSOKmqAesKve0CwiGVggi82q1LnWbI6w6/Q8fGjMfC35/2lS+WmgIBuj/HtjGJsGnT

BQt2j9890VOljIyJnejijS7q+wUuJrzqrtqkoI/WaO
iiP3N7r/xa7WpgyOvkviLok4+Wd7nwDNH3H/F9lIsxZLZKDCRpZpITdBcDdZv4TDqeDsHZCKwQfMA1sw

4RnpCr35DPmBhRytYy+li80jCHQvWNmq57Ol+o4C5dsextLSlnK1rznj4H5beH4Q6I47KUnDGidl/Iak

N33JjTZfeXQ5lxd0X/bF6U/AecJ7MbjEy8OuGJRpNH
+pjRB+sSsgqvHL4XJpufWgcuMSufmSzJq63hQBnu95o5EYFQ8/4Vu15KNDhBWi9PyDNRKOsByLUcKJo8

ZTq9LyNAeQaHnXspdQJf6J9fKileKL4GrTWDSCO2uIU/LJb42C+XRwsP+mdbmpQTQsU6l0UQjNXEJBRW

97N6jn2/tvxEF8RcIlIOvU4q2N+Zamt2yvNaDwlCve
/Q/Ppt2v3SRLjEpZl5uYBt655jtMgaVM1LdVhT/Gj+gnav5NFJnzm3AMkm96l2P5DlqAv/bfOP+9PYFy

GqEZqA8P7O6PHixxG1vG6sXJkzsHq4Zur8ZUrxIjE+aAvuhXQ5JvRY+Od9oMwDQ2s4bXxSgN1s/VAdXh

QkifG8Tp+Y5/WylFRxaK8t3P0xL4eISAJLwAYsM78E
+XcI3Mp22B9iEd8CGE8+7I4Savl+NhNdwLl8aavclxt8IuUb75WNrSBN28MOSC1Ky8ZrklYTsqosVntu

jluQVeBnThTkacNph57ULLlfLw+7VAlfKNoMMqpXtpcPJB+6D8aidsOCdGXrlZl3oD0PdzM2Z66P2BTv

dMNol0VcmrMQhU1ZtBn21UsAZbgNv26JJ7hpBYMTmg
VYkXdad+HjObaxdbLZPk35zoQJ4wAAarhy/5gmcfYhFu9D0R+LzxD5jhL8qvjHLNUgzxks/oJtZmzvSB

JmStE9plgEH8alrcsXHDAla3Gr7//Xnpus3LHUpmJa1dWkf8bMGVZp4Wqh4ARgGxPRFsBmvZkpmIZ5tS

6b81G0czz+KpwkBVJQ7pYguGgtq6IG7NvWj2+BTR7j
fk/nHxpxECQ0zvgS/4RwJ61c1U9Y1dSXlIiUY2W9wQpfhZDr0nEryhLf6VXWrDWIFwe60d7AShPdePXn

vnUTfQzGk/2BjbTIpaeItC+z5zhRqPOF0TnsB0c0PxFSauRlQYnkCuYBGFrIj00JVRvp9kTao/EhNiWi

V8c0r8sJmpTTNfImuc4l+2W7xvhR0ZPCsFR/Jm9aWc
dOukZrJN+cgw1+CWPbMM/BGeM07d2x9DSBA9v8jtANRtL8b7Cy2CKVYruKI5D7+hq/NjZfTvqnVJWlUj

0LIMhR6up5cVIqC+MXuB+NgcroAuIBSvxSULHsC/83/jO4n/00ujioFYJF3h8CzVUhKuMmXrFL2Q8rwM

vY0bKFvB/8OT8FdLQUdudC9fkV7yF4EsMdQVYwbj7L
/RaUfPQ5KUQxqp2jKY3SZiehFMoUVvTKDSN1qsaxuajUSbUbp4cOig8v4v20XcglIATAJBL+OPNPYaGp

rzduYTtcLgXzh812gHcmxB6fn+SXpWTVNW89IoxagOlyvlOMhtWjYkItHN/twEiV0bGwI3ylwXvYOCLF

VoK9UQtkA1XrBt9rkwU/8wF5jakQVbIKD9bRJSM2xf
wlPOxvXNNWN3cxb08d6n2Z8coa0lTvpRr8DNJXFa5SqmA9j5gzG6HNvSfXAP51YnFzSX/vAGoBSCKzl9

5KPUD26NC4E5Z2Y7JRn9anMkrAgLMwwQ9O+SIP1rm3dN6qAQam2YZumx18TNf6VyHknrmXV6FlCOeX9g

bjagqJl5R9jHoS8ipn4TW/GjuRyXErI+hN6nsDPOHq
lttYwizb0p5NVMd5SATI39cIQ9fP4DXNh/ADgzQpdy9Ylt4TrI7hOQlTO5izTlRwxA319F+/v6kLUHmp

Ntbe7nX1MzWMKqDc+IYV0HgerIRcUJ2FVy3RLxCaAtJyAi7uQUlhRx1mD00+5woJd280biMhLs2q4h5H

m3ZLcVFxKrxfCin0b4BNV9gMBT4j+nS5fRliwmiRGC
sch7GWyT7YDjsGceg635efyuTC9UJIyZt9fCstcj2Hu0igy9CobhYVncEpJfFWjA6MryN9Xr1zPmD5d2

AocoTUMjS22WKLtFC9p5CDVmJo4JZ8C3N4hUB0SKWFHrr+MFveS8xceShXpHRK7cMXOjWXevz3BWV+jv

39OX7Hn6amB3/uo5lIi4yZOO4B6qygIEIyt0m3/uJW
20vRtrLhYjSAicD63E5Yqpb4ZdAq0GdETiZBal1RiZY4oBeO/gbpTmxSxRmOywR5FE/4yEEllMPrO7Ns

Dn/8Uc9P/CFOr0MroGzXDlGOeQthXsuqemodCKyag0YEy6c21g6CtSij07CNO3PVLWWLNnzEXPEyyzBJ

1PzUbtbVV//y91HwMM9HwxwAOzmfZs6bvv7IdMS9D/
SlBFIH6UHFAYPQhaXDEGw9zz6OJ4J+51JqDnw6JJQeLROIe++eD8f4+NLLoj3l0+4hCNKbVrpasXHA8Z

+8J+RY6qVGSgDOypq4CWkwi8Z6MLGhmn/Z3OUwnqzRN+4iPpAU0tpjdcR85O4tbENmndzPcoMCgbcU9h

r9g7tquTuopUGAjYmuAObMO1W7V2oJs+ZbqTHg+DU/
+XW4hyBceFymJ0dHzGfQqq9rEU/5eO5obubJCa/2/YFvXYzWm2elt5fCgdMUrgTZ1vJTu27eI0F/L

nXjH4wNG3DCiqiOz4fehSbWvUw8ZmXoViMLL2EqUTWlMKAkBg/n+L3kYI836OnfnTX5FgJ5AomX/1n7Z

jZxxhb8MkA4jIoJvk4pmzGzcQ+ijQobB9kmOH5Z1Rv
cWceaIYXXaYOInXt24tUm50J4D0KJkbXjfvfLmhvY+2tSpSowQtqdCMVQgvBv3GA16PLqnXorqLD3dAC

f2QvrNu7Zdi2wneSnqUyzaPfNuIpSVrrfPxk3rXr9IRXzLm5tJTD3tszv00pE2JV1VkmAf2ViQY18n0Y

PgghxElsyr+cVXa5bcn7jcJ5S/MwlSw07PYK2HRc/T
g3Ui2STP1xhFnj9vzfwh8vpOnusaUeuv9JbxtDhqEZyCe3a61nc1zJd2MJ5mAp2dcSRH1VRyyn9F1hk0

DCjXMWWWAgiY1UqQiSgoWPwTdoP5yp0i4aMBH/4/3TZyGTT60Ai5+Ex2AxAY6y8Cba65yHOGln7MCff9

25L8D7v1Pv9NSqXPqy6XjQkpkkhOtP8/SQVQPCl1bU
lBGgV61HqZDn6ngV68Z3xhh9yaV5OUz/IdKibARtvEIblz442p+sSw+2gWj53guP41vnXl5jyb4x+Lr2

5xvzDSSPLCB12adHIbvHLIt+IbmwxCdRohiCBe8Yxos1+NhPfNqdYBtkYvoEeg/kHUTr36bnySMga9v5

7q8VPJm4d9qMAVe54IJ7V/SX7iBqXjNlB7MQDwdHfR
zMufpVyaBnyhww6M6av7d9Tb9Abh8P84n2LBdBCAcDWrESs/Vy4SQu9nvsZcAd9P0xawA87DFDkfK1ht

lCD6LbEFQgWMm5qbc/rG6QOHI+qmibikR/eU5BW/zCI0chg68lhDUz7/ytvbea8YGvt4+elEVBc7ByXO

e7EKTwmyFMVEAWF+wM/8V+C7z9Q2u+qmfO/dpIwPzb
lyyZfmXh/CrfPbiwitanTTl5b/MaC5QB9RDaKAjeOoYdWVi2vP/VzVBRiRZlNFXwZDGwy8iKKzVH/r1i

L64urRdJg6LoC+ZoKD+5UmAJt7BzyxaeHqTeroOzC7OTbEb09eck4h88j6fwlokVOzBLgF/KxNO/5EN9

FF+wb2J0fkxA2hz/8RPXw9WKYw+5c7US+YBTOH9w8n
c1+p3x1sp8xBjUJ0aYIFsyTqaaL8Llz8wWUirZHvA1KkHHWwHdYZr0Aks4rlpaWutW4IyY37tabubPQz

By01Y0vH1QuZHf7CgXKAz765TXSIyOx6QgK5RG+0j+x/Nz+AeJEdzjY89xTsTNzqnI/m278NKKn3a74a

+mrYlKavECfs6iVhTpNAH3S1940phwqEgw7mNnyQT+
NvhM/0IID9yzt+ldK8h9OxeBdjZ7mRqTvINOLzoV3NtLsHvQ5rMfJk6g5DOt52gGmsz2wEHDnPsU1ryW

9QEWaLvXppShtQhsLaOPR8S/ztedaSN4nnwRgn+EnmH2VVdAziOwzEy48HYEItOE3vQFf3L2Q4p+6Vnr

Ei00UI1Bp/K7optcFP5QJnA/k4XRA7XhuXqSB8y74n
fOkvgSGvEjKrqR47BhZS7mLXhN+uEv0B7RFv66dZGMlv/rb3L02qGhtxWKkCwQd8aObScRZOul9iZAUP

abPsAG+/Ud0OX9sGkVX+d7KcPizUS2Fp8bI+0lBKUNMk9zhHTO1QQnstigcMujNqgKTaHGjPXoTDfSt0

fY7rkIxLQEOVBcO6oU/vSwIZcDG0j/6OAMt+GHPCdy
9yvNhc0fYLMWB4AVhLOnWtw8q1bxoTD1y1xLcrbpm6ty/MPKFQ+dbygYwFyC2KZP5Pg7Ak+6umIcRalU

N1BTDiV+NbJ7TLWAmSxJYizW196Zo9vV3lQe7//0sqX94ziM5vh/HFTogm4PKy49jNvgR5VDjB2s2db+

lucía+qJTHyjdQ9MmcTpma9ismCpHUKr3KgMJPFLEkHtz
baqNqxB1pA21AcZLxGV9GCeIg5uSkQCkxcaRhYSxdvIhWxzkFVE/hiv/m7+zIUie5spSMdc1N1C95dcL

NJySlIsLrwA7y+ub33sSbJwoyoqM3uoKbdZoM+wTu4APQjqIntJ1ShIYyJBW6zCrXzNHZr6A8lIscg2r

IdZ2CCedZQYwVQN5qrKShcJgSl6HxBODgbNgue9NW/
qjQR051gk2NF6uGGetWN51VgTqpmzV5Ttfiig3riN4zQChfOWncm9HMBMStuQOUNGF0apl+augsxQpRT

IXTuchkoxYBKhQp4nwI394fKkF2dATzNfypo7dNdCOm77bv7bZzzTi89Ns+iEurILen5fZmfGwoHQ+W5

Do8EPyIZtd+Yp23OD/4ddWL/Xbw6/P6r3n0rFLTZAA
u1pK30tmy4aix/W7h/jL86mHdpWLCsOrfDsay9xfjNf9/++mi8jDBpZTTqmWagc1DvuZBi5i6yEWcS3G

i8K9oXSzU9o4tGbOGs6fYBFwcJKPw3Y+uht4tF6hy34IVi4m61sy1rscP9BzIix2CB1UbcaFP9rqTQt9

OdJSsLOaCG6VazI3V2lT8Fsv44nlxphpk3nPRUehMV
OrnyaGYyg73i3KCzV+adkqYi2/N6aZDtmxKB5v/LB/Qh5IfPFixoyd7hAlM42cgjSjlRVq/Dk4m1WJDp

fW6e/iKNqfxw9DK98tqW5i4UevfN7Y1HEqPfJWP+XxeyBogJwRO4gUkDOpcB8HN0v0T32tg4ybX4Ui+l

EpqKDqGAeCa2hac2MwZAChWmcALgUu4l50uJf6WwH5
t1+b39Y1NehSK8uBQjVfpXcq6rz+nMqGyHfkLN2Y/QhUsJfesN8M10a7SzZT/iOkWN6jHv9haGqerTdC

RVlXc2Tg5XC37xbMMyR2J5qK/d8KywlOkXNGpGxvJvuVbbnMzpo4pWHfPRECjM5Bq8MT00HEc7SLvh2q

QshIf44as43fHrJKCD7Abii3hBBcOybWX+P6rFc08z
QEiKi9V1Fiki+zH40yxYgvuOOE7X5P6pO89qmhtn9MX2hUzJI6e6Lzkb0H4b4TQN3VNDEq2x1MVb0Xes

lsn8ZABt5dOKsfZDP1RIY+xptKjLmjG/C7JMO193J7q2sLk1LC9+3+5PVbzXqWAlkRD3btHrUOQbwldd

F8LESuPzZnvWmy/UR04KMGZ8unbkFD9Mdw27EZ5XnK
1cv533hXN3yHA42MQbYDOLubZZp+BCSJIdFUGFFyKdSwwQmQtEBgTS9fs0nxLY1C6X1jlzIDyr34mCJH

55wZpSxJv0M1wUXsu0nbUW8C5WQR8upji7DF/Iq720ibUarO3mpvC1qM1iKte6syQ0xcrc+XV3R3LLA6

QmgrDk8FQ2FwMgT+OgmDK2JbvPV96N46uRgz7b9L7l
JtnB/WJPOLAGRIZoq94Bf6hS+S6FtAXlb5ywaNnQnIeoueeDNVKzUXx21x4bWSQFDo7oW0azAAsRVSL8

CNpLQQxlWmqKGkYU8FDygTGbmzcw2+feSR0kN2W3J1/9mxkj2JY0Fetbt/qQVJrAujBnQZvpb3S6EZ97

cI/U3WpnpFeD5ZJ4R4y+s9NzzjD9lgm+F04P+ZT5xF
vmr272Q/f3PhswE83FhgSQhB8sei13yYBw9enFV25oje9bkYxDOJ/xDbvfr08fEcdNnmi0uleauPqFDc

GgK6YBmIwnp4mvz93yieKI/y1eIvzHCoh9Z/9OEyf41L8JP9pb3Y4Gakt5oqLQGqG501wVGr5z/BGDBn

f5S+iFMz6hgf0VWxZyeePUwr562rGffaIC1KIBAE/o
Yu9tGJ1t7LQdXXmBcx7bH9aK77drMnQs/IZTbJbZ+yRdcs7kMGAzKMraETHXwMCyUMkBlsNCC0sJKnyK

8ghEEAA96z7bEJ4ThoYf9XBik/vivaVU3KVAw4XU2hb7TuGo3pKYviN/pfGfRo0H8C2WkJeI52TKa3s2

QjZa9CQDWpVVeAU6kTzHri9fSlxwJAIU5BguiCFXuD
khRM5h0fNthY4MjTirv8wxs1APoSHK/T2R5rXIp6Sfn4ZuufzIa7cVAXSATmq6qQ+lUig3wH+Ubhy43H

L8DGuGN4A5ggRe34V7ZT3CAc0a46Ruxznkgo0n9HDa8G2es/nC4P3KXULpWLR8cwglsDTFENc/dxh+f7

gvliC0r5+Ql8mSdq3eFeH4luQyYAo5YsP7D8SlKPAv
1rFJJP71lL13SIqhOd3g44K62P1G5NVEPRTIwyNztpLsro/g9PmJIi3srL20WqcyY1SuvUnyKYdplDWo

UREiKxl1F/hlWfYExIHrVxsVMLbT0ysQBCT55RRqHRTscZ3Fb4rUaO0bSH2BfSF52CmIzNmWdBwlKhaa

MbG35d++QSj0zVgjZ2LMNjBee+A1E6UMoEbeR8ijxO
STrGBTb++SyEOP6ANmoSWkZYOaJTN4qZ9f+lCXvXQUVqvUWmV7VsH1odLjtQXfBEwzM6K5G/wnslYUnL

n2jfyi4/aICuTPMd84d88rw6YzIZwEGbePISGMl/OzGxHYSkltXjYONv2LcJ+yW22qwK+ELrfbnIhfqD

Ube/QZvqEAoTISxMCohbUU0nPr7pYxl5D9Qr/wJTD0
UDcZver2zA+/IK5Mp437Dzvim5mr+ZGLUtP9RYk/fyt+wD3YQAwslNEucBuDkhOvtwD4A2six/VcjFF2

cG/PT8V3OkCLhN886JX2fYRV9rgAzOF4atCwYrySdwlpxfl4/iG+uxRi4mkOkM5sw2AeTk2u/H6naQnm

aQX0Dga03DKtps5srdvfe6iVPQgd7vd2qluQGTSyum
XDdzn2kK9lkReUT3ntKx4G9Hw/HgwI2lQbko8wMnPhSUeU8TKcqxBT3X68zsoEknu/8i6CNXUh6RRwKW

JLKavPSUEKxk8r68tDmRe78j+nWlSQMYamQfD3/JzMmB7ocaeK17jXZC7mPji5VpNA8aNW7uXxwtyXlZ

NCz5nFWZi+dCXwZNTZlqbbjz3Ponw7GJ1nJP/ro+BR
YYJYWcEBMEhqRCt59qONd/zi+EBFcMPUpsXrs4VLsnuN1YYry9mR35b2X4OZv9bk2HeG0uxz1Old484v

G5mdqyTthhJGPVC/j38fyTuQ8wFHN23FFJgsG4/LKSTPXElFKFW0YBtiaEA4ZTbd4zbMAhdS6nMe+VOy

GR4NdWNYTxbR4zouKpvPrtaOdyBWRPhBBeodQEJKHb
ujxoOnzwpVRoRNFTvqWljd1ZiPzTb7+mhwoevXyuihafzBzo83NM5ywmlz46S24+EIjhrX4Bez9RPeIH

43X22mDTdYt0ZvIrMipTuwBAp6jpbPEskAtxdhtC572kmkYJ6paHjsP3ZCCVmztkAOhcx/6q8Q+1AR1X

dDe7fREykIrXjjncTNekD+jdUAHpcIrLDyfdXTRoH+
rQfTGXpPljbk8yUI6M0VqVjZlGX5GceW/bW7LWHq8PRb+swvq0BAhAD1hud5y14bFrcyZ/ERSPCivnMH

nRt67wB+/jndq+JiwY6LZYKFWX94eucy+Bd0S/9Tttuwt1+ODgYyBoIc9T2//ImsfVdFBkJM4Y5tJLZc

u9mtZR2hoJKhpRCkd6XHtC1fr8btpB/+0X2r7sy/Mx
N81MBfRxPACqOZnH79OMOPD5AvvYJQDSgs/QZYpKOuafQgOaMt/XxZ+rCe4dePjxDLaXfrIoZeDXo5Fs

OMLdb71ruXa2CMWx0ZU0DhHq7VRhiGYhtveqHcjP3ipQQlBE8Q4Dtap221ZUh6Wi7363C4UkYXy6Bx4a

QcnNfRUCVUHLeUl6Lt9w/yQfxbOJHhCVZa0RC1NfiH
2G0SgPbbTVKvSN8kIkQU1oiSTSP29rlrBOpIX3qDMmNxvHpztTlaJwaoPZGrnWkYjsJnIemtVtjsp6Ae

Ueq01DSpznw/ugMAFrWeelTPgLrLo+9tbn/NQ8sruKKOV4yei3Nm2IonmKJ4moSmup/8rDYljk6YrNs2

rhs/xRa7YjoNefwNEgkjCSxw+Tfb0h0VKA+1wrN7cq
4Mv6L5Mb4TNQ46xCbty8M5fYXCIATNeF6gzVwtO1z3x5qB/lXUV5dO/xleuRm5wrxzdw/rPMVehUJ3dY

OtpgmQO34elzDOOzpQiDc4UcF+lzrVr06yA0zgLf2NfUhRgy+sy2BKHg9G2JBaLgf9pwc1yZBLCq/V76

WsF1kMsXczwwokl5TcmB6/zG4ekW9oAsRFg7gUPkpO
gAz0qUDQn3O2Z43U1YVoTyTVRoRFTc3IH00kO4IAVmSKfzVFvyMJVV6lVaj5CBiP+Jiv/2ifFrUsMexK

/neirPymTp1CpE2cFW1+cd96yFsYH3Liuwjug9wFtI47uLD6MQy2zSzFzYztr4Yz6O0YfMuGePebLpb5

vC8oHETlAa+fEPFHL7slphcIehjWvlUY1jij4fmjvP
tODGbJdderG0ycqjdvqf/aAb7+FVtxIfwq+7T0yLV11tG+yAkYFfGXjctNQvXitJ0u/2+BsUmOY7vEKg

RtZt8D5s0DeenIjdBc9aaOcddlbwzt7M+ZxWD2RRpZ+HJ8cdlyAcX39c9rCIgAE7pnXW+rCYvbyL8dhT

4IpFADSbDkrVgo0SM9lUqKBtmOASfBLNJEUwrfMv25
/Iws7o6RgzQf0oUUMb5G+TZgiU8GFEtovdqSed2cvb7xEfflO+Mm/fxqqWel93WUBj8O9SnHJw4C8VbU

WOYorqiQd/muCyv7bPG2jp8h7Akoavss0iS2gJI3lgpN1mFmNNGDz+MpEr+BMizxBZ8vtWbS5bze+9Ch

RgdQKcFQ/y6Fsp/aqgkGysaSBjpLiJxXgAhpuT2eKH
34QRCqTuMdl3iHUAAGZV9b4FV88pqh+G7L9aUbm4KRh8n4QVxFLxmmeQLioPgzH16DpB4dcnmr0Bea7a

dcSZlj2sWdu6MD28woXFAvVA0iLrxeyOX6embG/km4oHhVVHhgdpTRrdNW2pEVoooK0NzIVJ6WDQ3hlM

QI1jzMdHpV/LobLnqLChpOFwHBE4nQcOErr38sLq/5
6FdFNZEHMkzPNFQby63eDqhWlWelEyoI72g6aU2lee2YsVE9oL1Raspm17xkYHm2/1OBhFlabw2E4lc6

L70BDPm5WjHQaAGdB7moYhoOTuM3596aQaFiBUNfoaHrM17kyllD4pPXD6oy7CgBA3WyomtGEDFYpfwy

/JR9rYFH9NAzbyoKR/H6g/8dLi9y1qs3HoWI3SLXBC
kdtcx00fiFCz3Tc0SEp7xIH3+7ZqtESbbsyyPZuTaS0rlsDNMobvLJWhnXsIWL3FLD7hKQppjGgSDcvq

yOrkipNvTgsg4cAKNiWNjD2oFWD8WFYpd47JR+xFDau+RIZGODMF9wrJSXK7Y+Hfqid0Q5gZvhz5U8YV

Vof9i/XuCuJ2qiYvGPvBIOWcfe6ARGi8FKz8I0tSzZ
1WrR9mkOsjsYUFEBlKof5Q95g13FbJosw8BYdP2NkWqZwyC1jT1PpVG2BehTAgcLIuCZTmSWodWwEZ6A

aaHIgeC2w5sWlGFGY1blG5lLKAsN7+e+BBQbvYWrJcJqBRxaCwtaP4C42F0XgxX7PQhkuJmQ32c+bWXS

utgbrajrKx6uCJaVjh4zZjAZixJsar5LP4MsnfmmD8
srnJ3+zxPYsqS0GnBB0BfkvYuwh/26VH/+h4GV1Fda/wcWdO5RJhQX30mLhXvZskuPaseg4FOsg/I54r

szTUMYkv7PfI2xvsV+VXWtupDZOgGtBSSgzI1fxdsPZ19Pq6PgxH46gSEUi72PtNivqU9RfxI/9JrNsz

6aiYlmS6kl+iEehrvPhsX4mEpx9vRjKCm1NTx4N5GG
Awj+M2TVCZ0S5mWj+vxaO/vSPPxkWDNkMFXwWm6kKvSK4ECDUS5dEvOdxRkNbc4PajvbWimHuTcMz1P+

tZivD+yBFklrB8eSZ32UQWnJ2vfaG6Zu0qtAvtb73yta0N/6dpUtTS4OkEyd1kJ7d66Qi/v15eb6k9BK

16E1MQDNC4MdqgP1bxRk+eqAOu2yBKMJMtqHU2kyPN
L4B0nrLQ7TAVpsSoeuz8f9iAEUxzETzCm7u+ww12CXBCoGnJuz2TTF/4nirs4Rjbo7r/+OmeKvU2z3ux

KINb8GgnKzS4aepCIKnDQPrI8q/b/khZD/44A3A7847Ur2zk2ix1yFRAyxEc2WjjhIvnoYKs9mCV8W2g

1JPxTSgWlIiNkMZPh9E/QEBSgjreoMjfvgsnuh0NTE
Ofii4qJF1nKIBv5GW8wimZdd+WxcHxKsd6QJoZFS0TfUg9WK4u3VqCNw6pAxc0x17zLCeQ1CPZDu0F6r

Wyk8CtoU88WtKEVw9pme3jGyg0vMhGut2BLxJlpazD+iNt5wPqu33fk16AsyqZj4jsBp6V7FaQcgBIb0

wHmVuUtEd9c5tv9enMZ5og2TBQBAdG5aRIY5TbvyNk
9KDFFpbtFrd1n+5BGscOf8en9rDCICFPN1uQ1gSZCjabhuoZKoD4MVOn7DDdQL6UItUklwxuuOkSq51v

1pNwfTEQroqsaLMtq4Lh2fMGm8Hpy9A1LERbbvX4zVh3dqaOpo05N/P9qQmUOL7Q3cnw37OoKCyJ1ou9

6cINd6UcFmSo468ANiBmo4IGXeb+X0uk/tSqIJvM0Z
o6XcLCovAa1Q+o28bbXxMHpl1doSnbZIiYirUY9x8lpk4NCM4YQnHZ8WD6pvKth3wdah3qPRfMtXtS+b

ca57Omg8RQGxa6GoiEOLVtIOxc9KK/oUshvXTaUuih4PV7azB+Ys2uFo0paSA34YO9X6qMXmkNiAgljk

ABivW0d/gTrqXym7Ql6B07gnn6459nv5QucH9XpBcu
3AS/4pHgmMCTtWGHh4PEicxOB1K8b+NPZWkeAvL3laEYqYlJEicVMyKeOI6bCoydtsLPAftZYP4Kx43e

VKnSzJgbw+avb3GWYU8NweNRr5XVzMwf3c1Uob1Fe0YGbK77SKr6HpiZ9CtCUM9Pohhrsx8MfHiu+Qj5

i4yvkT13f5u2tEV+NcbmxPAJhWYXjwrN8+f1dCip01
UotSoWG60s1UeVskU5pL8Z7qWLJfumG6ae5fp0lgFUGy8AuNWskIZS9agIxm29sKvosVoCV/vhJPzHw9

5dzdeErT2brwjzZKSpwU/qq0jsbatDhwgkmeQr+rdshDvPKyOkzaoRBvSftA542iVCD5FFnXoQuY41mE

+fz44E1WdNeYmVJ2utt9P4PyXSKoJL+ZfpB4EmOdKD
OOC+Jp0bbAof+yZKW6j9EB+zLw8ES1jpsHlCsEP95GDsJd+3uSnEdkS8Esa5ltH4S6O+0HO12BBn+DqZ

EYZ4vL7+J5JfXANDYa/12+RKE2NvD1yXC8QIrEmSWzZnaupcrVIpSCyvtseS1HlTzBVex9bEuOOJeU4b

FzHwnSIte+8ZtfzkmDpa4eLHyS6FX0IQ6cdXyHAmEE
5aI1hSCymY8140q3KpEwAC6JaclzJOot2W177ir9tS73vP9A8OVSGGnS7ssy4WicslTitSAphx4U0nR4

dC6OPO70Azw8YkutZsWTPyoyuAEKVaRZVVMqb5rKZ6W7b2SVt+Gwb/IxmvQH64wsTJnVEt1nTRMuwd9v

/9zPhhpzDsvtd7ZV37lD82P/JVLWrF+VnRViNr8T3D
tKkif5TqJQJ9HbQ2W3hUCB7fyF8TuOFbNqH2YwZjYXuCfBQ819BsNFqRi+ZF+CCynVy48IG/zYefvVbl

73PHdZgdO0cxGhYbF2Fca5on8CaYxpY27FyNyRTbF5sSuTgAK1KMtolWMV393xFGMNM1vOcKWqjckTIB

KLt4AKZerwjkaUae+sa7CN96lyWziBe63g1eTVLTXj
DX5TxUAx78tFyEEJf/mv6T0BZkN3MA/DE7Evm3pn4BOoQ1HoqKM8ba0PhdCeEA6zUPYmSTXY96iSv0tr

LZmm94B/41F+pPYJ3+6oQp2pDV+NkJuswarfJeeu76LzZfs0D2gZhuowXJgoOkqG6WZr7vDkz9DHE3JM

mIrQR5EgNBxFN24rDsRT2kxLANM2/V0wPa1d9NSlJj
qQ4oYfjabXyVlptfoPUkCTG/2KNJTfnZHFcipQ1eXgndMSCtFf28hjZA46rjCpFV4ZoSjTUPNRTLvU07

j4ukyzb2EVRMvvuqo+i/7ZWxpVdbsd5RFCjEwG7zxD7/hpqb2ZMYANwPRiYgb/ygIV+svSg+U6EmioUz

7LOMIQ6+a0HDY9ttKcQ4+JHmK4Dbd5Gx8xXkgFgZNO
cARDCnrurcjbr0c7r+ku+9r3GckJaE3EYFeQ6V7uoUdokyoHiJHvRoxrHQoowQD/319svS5Vbslne3bu

fYp6Ol2O82Bxud7i4GhCR2PcrYoywFiTHo1UXI1E6oOLfMhNO9tJ2UkfzrKcGN7fMSxhJmxmK0TcaaWu

om3tH8Q85AK+Pwz0ixBDpfFiShKM1ah0KLdyn9zIIZ
VsmtpZWaXSWLF71/8Xbjjd7pIvnBrFVgAEt2elyjVhWjhVPhAHGevu25NCZpEfWYGWU9ClKeFgYzHxVa

VIcW3tHbzCoZ2iI53IOQNT9NFdbgH1BfDj8/HhCXHZumxw0DpAiFgByBu4RUoHbkRB2RAiUayh5YF+Eu

/lGjyQC1LbjvsVZFDIs1TSXFL+5bSMU/rVWepPAutj
9Rqzmfq8pYYaZblullCdKgTk35lqnnfQldzVtSyisr8D1p9Z4dEpd0xVjaGObG7BPRgmGyqV6CgYPpyG

dsC0gy4gKojaKGv715/TJJFoXElGkMH02enlR89R1mew4rR6BHSKMFLKK0X1d5UnSqaR/Ye3oWjWYeHy

THZSNUB+bJDoIHLDX0FJ/u8ogT7jtyRvUzCP1V+iRP
hwTzFPivKD+AWeyjcNntxWg2G/ZwS2gCLDGV/UT7+oKLUNsCw1y/HKiiV9kG3hGL6NSRa5CAVgOOT7DW

VMCscL4yM85fCQu02qQCEtPOxnBWKg0li/uk/PiNov5a8yQETY6Z2MufK9RtAWkr32SEkECHOw6sm+yA

NCdfSykH6KwECWFxCGqJQMl9/RmOO6YqtJn2J5/Wuy
8UxCxtLz/z6p+WYirKe3HGXbWNjzpmRawU2ZP06sn0cSMHsfsO8vSSSvdHXZxqxHz+HZReQzwjjE2bJ+

0gK0kRWEyBN3qGpOvgAyjCaD2nnX+w+FzSEAtKxkdIhzDNtVPYvOdAQyYl4meuu6qFuFbY7Mntr3+mFe

IS9BdFKbWrEyt7r94CnOvRXx9/TnRDZailB4JZipMv
V1Lkf4GC4W4o1llw6LfOs+UB+N/RB8TDM6r1LGte1BkvRYZglpI/cIcTaGOTBq3cMYJJ4+BKQSpTFLR1

/3aAe4kmxPvDM9IBF6gPDodEeC0NDCeH7dot0iL0HDB5sjVCuy4M6uG1VEq8Ofsjg4+HNqY0NXTXr2wE

QfcL4Db41ZAyQzXlA47z3SS0FUzJOih9Ar5JWtaI6C
Smn76C7VC8dyH6huVCpFbOBnZM7dTWPJjooHNUdwWSkPPBqqafSB27/SsYW6rlmUA2S+T0wtWfzbSy1A

b+yPPIGzdEwHj4jYQEhboz7TfuEHZ4L7Uz5+sT4E17Vb2rXAwAsk7TEtZIFDu5o97B8jHdl5VOC5SwtA

3k63lz3NhDugku+Z/jD63x6P90bYLBwGBUPhKk/cBh
OYNpq7Y8HSCuEd926XdjmEiprwXtxvzPLVfTC9hHlSH1RpZssmn48ysM96RWqYZT1o7Dbo+zHPjAWuQ1

A72DZ4rew8xhtP0OZflM90tbs50sm7hDjfJvc9aWcc9eDYSICGOImc8MPbaZwcDMGpX1X2yfBGW7hLPI

HzIls/cwzy6AptNq6Q1owcCdlt/Z5s4IeMut/N6KIY
PmqtWR+i5oy9gofN56TnLjGspPZqIOKuwpdRIDogz1x5bwrHB4QK97iEqhr5qI0G9XogEl9S9+Gd93hb

bCvcHtsHBZHVr4+WwhIfyFQmew7FlrD9HDd86oWJaoefLpGrCuqC20sh6l1o2SHiF8WO9pwQbwXtjhgn

9tShMGQSUhZbPD7vY+QXV8zOhNAg2fBcIT3+tBsOON
fdAWIpaRmfuA8yOKgdtv00M+d1XAQQMXJ3XiX9fLlCD6bh0nr80FcfZZg2x7BOvOhtaANuDk2km2s+sc

oi5ur4a8N/V66bcyw1GsVZTH1sS3NU1l7Oz3Zp0Kx3MfwZjJammSNEeoEULQBWd7MYF7me+fZ5/nJU8r

DMozKYc8I2bHlJwfyX/VjK7a2ZCuaUac63vG8qS1jn
48DJAZHKNEnIuCe8WFumikz6JMuDeymjJ3YQlO6qn12Hsew8XDeSVOeYznKU31gH/ybs79pvx2iqIMUB

l5fxQ2Bs8Mzpg+atBv+SkE65E8BunlIqRe5esWAYo7eWV/XPx9W+hWgbzTpFTHQx1FhtKCDkgErByxFj

M4k6nDuzJ1wa3PVrsI8uhoFKbURpvqPV+UBqDhLq0X
jHzigg2xb2XgrWhC2hZ2GLnNaqkLmO9xKbWf6AQtsyKVk3jKlRr/AMNpAwv3fFa4TPIo8mUX2iv3/01u

CG15LSP2wjLUhSUzTjYh6AxOwV9jc1rsCyiqbEIRK7zIO3OWamebBdo/0+dbUk7D05gsCS5wdGvQXAZe

cg548wykhfducaCZXx5jKp5tF0T1PGMV80rKqldvIX
0Vt6dnc0IYpoKPTIsWPMaG3awldjXUL+xmMWYsMoiXQu52uMTYL0vcwjuCCb362m/tBUm5e3fWACbimR

p9ISDHDwS9eNaaYygeuxYefBBTw8oSFEQlI0w62XDXGxDHst0XEzHVhhyoe/c/+ju3B7Ke5oFMCVqep8

97PN5PMf5CPiHtBhCV15npgGbM9v4NypdYfPO95HLL
SRY9Dzk0GbSqvI4f9XZ0pfxdYFpzv8NnkS+eAYd8QKJDY6EBbUnGFRLTGatY8upt95Rcaa9JC7gn3qWD

10WuASL2Atql3GhcLcV4ju/B59NjMij2/5naz2qZk7C+oBgpF0qNbO7bslVd+d1/g4iIgIHvcfcDwBEa

5NWrotRnUXtjhhFjYCGudd0UzqtkB6JZEtsDu3bWqu
CsO2JKeehey7ObE93V9uzBcqc7w4PY+0jCgnUGQkxFubY8omWHQ9CeqfBdDNLNrUwuAOmKsX0CY5Qfgx

WAXn2dp3aupWQGAHOamKIqSkfApY47zZIgg89AN2xAWPmF11tnbd/m2y9L5VoPQ4sleFGmNi+foa5lKB

Ddxca3QNummD09ldQIw8mOR58/ZCeDN2IN7UX0kp3R
weVvTSNCGLj0K0tYdHeXHpvgFF/+qyMCpqwfWa5Volrs2IFYjp0FoWsO6hbKuyNIryx/Dn1GnrZKc5YD

SIrnMXh3BsEoZ92gyLEl8OCOdfLojhjr9UShDxTUttQBHpqpgRQKwTq90fjF0pVtBJB6l1oMm5sB1bVI

p60vAIXJB0IAT+Z2Zm9ruc8l1Y5Rg5W3KEqHTfOnd/
BDUs3v14FZBJsGyxPYAJE7RCSKGF/a/IXwQoDZi9eqYrtJrsFQeX4iNiXHuVttezyJ7NMYnE7jqQ+uwd

Pe8Pm6Sx//sXdn+LdInyP5tbJdmo6flFFLBo4s+DLuJDLquXT6Y/1OhC8ErhgCwFx2vaaBpyC2PBu7sc

EMnC61hWdjWsrV9xY+z2k+1jtUusCAjS57UjeKqkza
3+bEBpeaGC9w3PnnL5ToiWuoP32Ks96h6g8Vooxgl26V4Mb09e8NnH7jWFakqSSOGH/0buLR+aHjJV4+

fEfZsr+J5cUgJQY1S/Dl0KPh5z7PyzMAHazwkS/HJ6DOb460BA2WGaFpVZc9NEulVjTrutFzM3Agigsq

Uz7nl2qCiHa/R5C30gd7n7/en7HzpTqBlEC9KcJBa1
jAOJY/7UwfzD6b1XrbQRSTNZCxdVMUNxnz8ORwAotdqH1s74ssM4W1n0wuADb4PmLz/1NfTfmamUfFfe

JWiyx2WVcRy0B3jeMYU06W7JsUI3s3aB8nyVZrrXazomQ0ckjzdEdQgtHz2DyCK/Ph+am2djSop19hh5

1AqzIXILyI9ycvfc5sCkY02AQQwlIbtboRWaJpl5Qh
Y0j4vgeWpLndUDcpImwjFyXzmebkkONRl2XjJrh5sb4/G5DQLnQmhNwsr21kiUxSkpo4QSdSfe+alEgT

+cqNGZoeTCSPYiFf4v39gfT+Ci7NhiiYbtPIBlSn9CzwSuP8qsKnh6Tb3QSn4buXhYm3W1mDknI69rKs

RYnyhiIXAolVtaonMBwq1Cb06FswAYeFhSeBsni7Gz
BKBODOqJTdvuIzTxH/Yqh3Q6Tpa/x5MzHWpxamLUnrsAyuy0xmQ+RUAoaRQ4xAomwx5KrQ7OhnW5LPF/

00BMkYyDSR2sEuaTjyEjCweU8nPHn6Wif6KKisKD/R3fF89YFZXA6J2Ndb4IDBhJ5OAOqtFu52/Sp7bv

NmQdr9GBDk32NUL/aQDTSrohS9TvwnRfKSQSpcSmjJ
mz3cI/3Ch3S4n93p9AUtX+PyRx+gcG0l93blWZ4JO2XBalXCrl0bN3eP7UdhBG3H4/ko83fLFNO8HrqJ

TloOM6sFiUCtuMazOyIDqdzj2B36vVcCggM2nPbAT+B8TRpWQYLoJnWCNkjmViE5/Ek5WjuQsAc2tenU

jswUyLI4dkfsLZK9GqExlMhRaRWQOX1TyLq5BzY1l4
CSyPvoF49hWqy46lKjhzLs+3+9F4obtlchqWT4Vxp4AMimZQbp5Qx4eMZk8SLKf7mzZXYMM9vO98SbmL

+XAWccnF9nDgBwU7fR+EiPoptOklP+T4mwaI+Vd+NbDS2hEOvc6gQtbZmZY0ZobFtgIdkj49R+q61MJR

Am3MBnUOirE63JZrLTb+fYfpjWjgOyWzcQjLJw0spU
/RL+KeZCfks0nQA2hecnp9NCRqgndc0bpiU7Z4o6M8ZlUUbke1gn+7Hi2kIEJ3cDto+7FmpnbB18F+my

0cPRde6Gsm4+Gvh0b6DrCmNzXnM0qpZg68+exNx2f2r8CzTNb5GDNwlrQbbRCH1pSCbhgC79jNeBxG+A

SUEmEAHxuLVoNmSfei5p/73dnurKtv3PeVe735xJYD
LUPur94bN0N5uGuU82ZPAaGxZCkkSDqaEx1AgNxnhhku8rjb3ec/3XCxE/Sd1aURnbgDRV1BrbiCzHE6

A0JVrbj6zIi7XDcXpSPawFRrd7JP7R2kuD0CkiyuTZ5qoTZTZrUMhjuymUP+930SWOJF44VzfGKTAFGE

uRBG5wnKFBQnYC00aYxR92kNLpaAeDWrAGIcb6awsG
ztm3/P5MWS8/uzgMaiINyIQjqnJv9pbt348XTBlGd/39HCw8qwry7Si/onBNJ2DB/j/40EXtXKTov2rN

i5wPZ/KjF5KSVXKRCMnZwELbTNkUfb7MU7eDgor7rRL1dl6dYX+Khke2DUgtktSHIpC+6o+FS8ztruvP

fy5+HYpuZJMqoScYSn2hmcKTol3aTknl7ntBPjFcF1
Z+S3xbVEbUL89g3ZoRO9BfnfohYloY83vRodlkR56nmH/DM3TFGVEbjOHfOKbiqDf+cmOJhKpMDcFpm7

O8UBVLOwB3ng3aC93B0b8FVopWXwOdsa/gp/fTJC2IJvPkrfDLC73T/Glx7nF3MLKyehrNxJURGMqsZ/

riwLQKrv6fG8QyWSpih5Z2ELWvxH9fucpzc9ybTiwP
h5I6jE/qXbGiOvuOouDmTafk18lHhcgmrsQwJilSHsRYGIawXZ+tKDEKxVDsw6p8ShwekGDNy6oaxFic

Kw20f/IWz+oe3/yUsuuA2qO/KXSZI0020kfjJcqC9ubk2lCBOo24yueVptLh3cCJgwV9sedIX8iU+twA

h2u01k4aOIiEbqOOmwH1emWPR5Gv9WUNpCYmIb+Kirk
tPC95SDK1M1EJuThqGDv7yZffCG9ktwnLTPGgDPZST9NEJFwQ2bxYOmMYgkgnGgtWGSJ2IyY6qdrP2+1

xuceRdl+2Vsj2aBSf79tOy0cW/XXhvMbKvYIpuhSRf+uxF4SlZsVfyX1UPlQcBwO9TGAYvWFmxPrQV73

Ss8J9SlXr28rSf3ftSUFqFbgKbd79bTK6B0/Q/TDJh
C82ynH0+WXzO0/9AoWA/EccPwHErLvAfPCq/jWJmssyqV0Y6Seb6hiqdE34H5F6j+Cv94Zq6MNEzMFSE

QBmpNA+8qXbmHjGbcFneUVQxB3QLHJa+NacGhv+XFGoGgbRJVkvSgtvXh7q5OF2w1T/ly3D2BJzyfM5g

lmi4h2w0G3E3pRr0U6q12f1SmFpWdBH8H/deL8S98N
bScjTEwX1khM6tehejTBi/ffJPkT+R81L934EcxFUoJisLiIwrvLV7HD03kD/2Yro5+g+pcsXawNwJp4

HtY4XCXEr7rasqSqaV9vHe+yUcbQmCBG7s+75ol6AK+usZTBXK63lysG1mQOLH3yJaSSzt4mQ3/FCmJg

rMlMWrseMVrxGdhglHC6uS7WaDtZOT/iGG0N8RER5V
9I2Tz4jFAY4z5DZRIfvM5ZihlyljJ9Kxlf9ammNPAOXRaxYJQCZQp0zO0Yq+e9jgCohiQ70WO+wFbrgo

pMwOkKkwg6o9RrqDvh55jJ449VKvwIakRRwV7NvOQ+DpeWE45VKurnLGTIG/vO1dJwN6x8gAnKa04TQK

6P62YcIqJMqe8gPR/lbVSQ9VY47c4t8LgdmIi641p6
+z+luI5ZRio8mIXpuoowN8P/i9V6nGb1NZZQtZ87w4kjB6lAET7DXCqsGnc2pZxa55DDRn8xzHruL9bP

394hklWFVhEI4eUuQl0PJcSu26EgZ0Q7/MoP/G1fovE4fp0X1VMDZgOesgMeMJYI49kM+KfsBVbbxoAw

aIbPPNqWt9a6oI35mrakQZdYTRRQ0jfTkvn1s0D/9h
HYUO9GzxCvMo7lki5durmjG3FLvFEGfUxetGi5oAgsBqSSGhhcvsSqGCfw638QvEd23doKx/6qtsBz62

Z3N3hBsLl14ChUyF5sV9chbl1YHTtUJcQlAo66jAJCDQkkGGv1Wm42i7mRdJfedzeht7VlT1t6x0LbX8

UzCTqKrb8AiE7yKMubCMpccsY3LAtMBEj8+7X8muGb
SUuEB1xrTlF5nKFyPthQJpzGXrb4040IXgGofLDNtfJRUa5s37JMe+einWHsqEtps1RdhCRAj2M61AIw

HIftHdHZHymc5rsauOBxuCcC0Mil77fpd8zMXx4zDt7plsdfZPYfY59ttiS/EAqhrbYAP7wqM3/QFWXd

fN9KjXogPnCn7cXRNPJY9mT1iXgVi02nSOuVAKkvx0
UxYF/TOTFvBqpHGM188Vo+iGxEZFtsrrnxpbUvP/uidUcyMpAiWbcIGnnXnr975ABehAsN+EciOa4vLP

pakGT2IeMG5bhDMbfzLa381EBYb09C6611h6mPKsMbvDj0kz10i24sK7kxkx1H5lxcZO1M98AVJEb75T

iBuKnFc9l0C3m9ai1hkwp5POTWr0at01ZNR5/x8qEc
Ar9tPEAiYR2NXAFv+1+w2b1dT4KXkiK05sVeK07IAWAcd4XAMBLB3K2f+KS4jziRXfafJdBwrHKk6IrO

QBZ/dR5zD9vcEa4ZUyF9XaJOZICIARcbwCLPeZ5NpRYP6HT8Cnd04J3bE1XleRlL3+ZtKQ6sgoc6kw1y

gty6rAwEvBZ5DemKEGVohrXtSzlSHpUUxYdo/ED5Yb
Tnm6ZKxdFfOTZRgEts2HD5ZaS9aMUiKZWZcAt6lXHvPiEWWl1AMm4K1DbknoqsZcZBwenKvRbVsTD5UF

tCJoxaZBvvCFjnhUh6Zj0EF0SmbVfoTzoAtnpRFueAEftMkWy6u7cjNDXGC0fpSPM1d2hSm7ZeZxOZCt

HDsM/USrZdVydDXvxHnZB5plS/+Goj3g0FOTgPAeHo
B/khnbvAxa0Wgyp8Kp4b8T9LixflalpYfiJ0c5oLlwiTRqG6rvqg59Ei9vnCTNSPm/qHvJAFqLjQbFRW

LXC3BW0ApxIq084ZtQ58s8//zc4sS6lPOrs9wWHW10InLFaB2mhDamYv2fgwFrdfKinMzLDkVeqk4pRt

T3IJWQAkm+RCYvsrdvTVi/gG4RpnA7VVCMqtIoxJhX
p81R92a+ScEXJ5uETrw0rw7bca5ZgCfjtSox9diNGNxuB8+8HtfOtfec22bV9l5VzYfaUfNTeKry09Qr

cAds3jFPRgnji7GtSfwxpNDaTEWg7zakGxoHZfuyAP2bP6jaurN94x6C4wmJaRZCbRG8T2ElAhMogifo

poqc9x2ilz6elZrHuB6CR8C5J6J+5/PkB8WyCE4lMD
TljwHSUngPkzIrAkc1wKapkkOONDUZBPWN9Ydg1/vMTpCbawjMWjzRnA2YFNjZ6SQUX0pVSuc7XD4HYM

ZDpAJuue8XTdy4XmmrnFhEIuFbOJuFAP3Fxkck1g5EfbT/N1CGEJ1+Fa5hfOP4k7WNW3XPJbOh7b+LR8

+l7WxTNQApglOCmdmnwoXcYrV3PHXvshf9d+nqkC2R
PIEln4/7JBeEYZ1FN6DhEH7kjssvXu+9suvW1w9YxN5WT9rp7594VK1s3lkSK1ghuv5NCI02TS3tQ0Db

AquJL1FWy6/PUN3C1PtprKMuugR/OO+j0xB2YkBc9JwhQeICZ99bJ8mIxqLH3lq/OjpNBPsC5xNfILdC

2HJUwbNcUZ1h0J3lRehuV2/FmRgZLl+2GenfrVpZ6Q
LqewLAqbImb2a4u+w5753q749uMjFrDa+ovcLp1Ty0jq53o9fkPhn5vtn3gkpVkCUwM2fqvOloOzj4PF

gmml8v8bzt8E9gfkTDfSDJJoJ6uJAHMuNuztEHjms+qGD9snsNSd+eLRTa1GBmVarThhDz3FVWBdRw7g

P673NFZdYUacGT/uL3vH4RbqlyMeLnP3PfIax/oHYp
qzS31wU5JL/IH+OKBvNoCFHMiH63C+0sGZ2rE8cT3bjbi/t1gewJzObsyqGTgtgLPQmO50yl7K94NIBz

AsJX6t5y7sSF1q7DliLrWD0LPRJIE5u2DavQHaMcLvkrCIVuGoEkRZH5lWqsa2j+jZFmFe5XRE+HLuV/

fW+XN/3/t+pM2c4imxf8t8ei7yDh4DsMj6kGwauUsZ
9LUHwkzstMUk1ab19969AzHP+/CNaUN5CuLo+vl6YNJtGcpTKeHZYXeaITEEBnQQdIl2yZX/r31nUP+G

jju1iqenn40LWd9opu8R+sLU8kq/icekJVAPFqc4VPySKZaek2nsuvR9Ld3b7mqh006vdg74beRyJycX

kQJ79W9dtpO+zfUVsuXDMzXQd/FeOERshafbVz3DIO
u9vgci+SC+0i1cClfqsYgd9p94kyfGI2AWGRo+ZitgU+j+ViJpPmtU6RyNM/ofIBcvTmdEkYikO1gqF1

nl+y2dzU8iovlkgfFu2GXeWSVmPxR+SdQxZ+/j55c9nn+gBW1JHvHtGjHaAyQ3JJcy9gupZm9Gf/6wlY

s1IqAeP08oZQoS45c1v22O0j+iQvb2PCMin5CbBXC4
qq0KjK1ZD7UiunWxpubGAJ95qMkdv59nKMTJ0nA0l5qLYcEuU7Dkcig1/dSD9g8lxbqelb0WUxZPM82L

fAsRmbrmAnTW6CIqT0nzi+qUJLp6BfQWLZTWORkpX2EKODvnFcyjSYQLmwr80k6m3agnu00IAe/Cxxbz

6WXpeDnpxNX9LLntHePbeqenk9QUjYBl0pdt1FNMfR
/dtzJ6JBWbjZP4MYyzqZQA/wZF+2N5U2ca7VaaEzDjt/TwJ43kaC2K12mRtoK93e/xKra4htUZom+nWI

0dj1EYgWh4Ul3ZhMBTe0I5pA8OAmPeo36nqG5S4JR1R6f0P8/ihGzKEBtpSJ1zM5pwOq1z4nu3UnKlpc

40BD2kbonbgsJTIilpLI6yRqi2xJu3FVwpi/BJyxnR
ojBjAlMGFHsWx64lWjDqNL3iqluzQHuo0y540jJxQiSmdhLkD+DHoyvJGu89ExY5V1nu7mDCE54pj+qF

j7T5HlRdAw/vQPQVHFv7AUugK0r+IsZAYWDNOVS3YmeNyzTzXJj6dogPlm/QKwfHaqm6ZdZ/vRpjYm9x

weHqKKnTR6gQdn/BtFmbYlJydvkRQKGwZ4z4EONk6+
UHfEiJj6Jbpi33ayWL5CcE+iQo0QFUQUf7Wl+rsKc2bSgXSQSoYfXXk4m0GcQE54B83yJkGwUr/NVzpM

oGrUz+et3IeZKk/UqX4IANJNwu5Eq7Y35dGhnUK4Wm6oByg21NNxxPbCXbTeus2gYS1UiqdC0woHC+lL

MJUbH9H30+jsOu1lccaqUxbSZwqPkalT/+JnKLSSUd
SlYT1Bn851cynEhzUL27Tl1Wc1KBde7UTKisi1TlFV5gqJE4lpaZZ1G/Tj+3IpqI3z9nA1Ub4Oai94UM

of8bokGijsrbPAqowpHN/mnHpHY2YE8O8ipDT8Yo0ZJ+efmZkCU09wG573WNIASXA2qtD/BnbuGjq5rX

z4o0gPq35SqNrP7SbwOrDg+/1bf0DIMsldV7d/bv0S
Fvkkdg1akyJdY/bV7p/xllxT8IGf+AgsOFccqObGDWnrDbDvhkvG5/v2ef52ezuXz7heJN0WoyqYNwe9

DRLos/iqRJdJPhH6vTKIilXt2AAr+8kOaDN+byaV0Uoj2YHNiQOLcxYeaOWSp8F6y4dNU7vKvtt/XLcP

DnTO/14Cm6ZrrsLF9M84tg2JQkZfg7i/EFCoHdD1go
TZiXA9iMGD7ylLdAvUJ8RRYwYQiE0Tj/ectm1HGlR3bM7ad6aErbTctpbQs39OREhGlozmJN/uRCdRGq

eQvkmjl/0YzR8ryG+zxW/fnQt1BssgeRCkz5ujzWb8HUE71TV5O2/vzd2Y03eICG6xJ53NSpNJyFnp0t

BnoQcJeCxBdeLpTPaUZwqyk0UGifaiVjSn8jNfr+FN
nXOIjfuJstD+Qe1dSxOqn+faiPyAMrIaq+Jo50Je8LvSp2CGDgkuq7Zz7V5oyxgD54CF86g6XUpk2zcv

lp7SDJsOGzhmz/HE74v/gKfEOsTVns0jIokSqNA08fkaKfMrtCHhMDpnN9OnqDN8KgDoym3g/oA/jrqM

cau6t6JMOGII90OTeenQDy3gMVu/IzK5Da2xaS1twc
d7TNr4K17GMHKXKgS6Th7RTOzNSX987TtIqL+l+vMkP0tnBLLaPkSz18LxGxTgEmYefCA3uvX+TXDzNJ

l/m/va6hKhfnWoT07os9S2/Odb0BPoTg+pAXHi4H0i/IQwftMFbs4insfaHKLJJIdHc0oVWoHgXHuukC

K8cfOT5Py29vc6k2rJBoFooFBlaP4xTr8ucdrg4WKV
b4WjQqUj7vHV999ffRhefijZvRL6xBEwvYeNhTDcV8Mk2XU8uGDspIOCpx9eAlWnY64HnFM5zPjqoThq

bw5vE7zk2PH+WEykMG8najA58NHfWxLTzYVrhEgJiAcsrfMcVaCb55rMsitXqJRnopk/ilEUKwv8ktdf

oiqkE0f17ve+P07MX4GSDjv4x9KDTk4X4N6gxAM3D0
5iOFqtiuiKF9Up6sK6qiEu+jFeoY2ect6Izc4pBtC6FJhJiNC/f+vo//FXp4X4Q690fHUl1PoYEMH4aE

RiFnR2gDUkt2T/sYWDqk7iC6RJtEaiPs9ytagtxnrsf6RU8M/Kilo+0qFj7hONvMvXtNXDo68CW2HW9p8d

UNrAsCt7fS4c3ajCcWHbPbuBduIa5ecFr9luwsjGqh
BibyJKa2+42zwEGdxl2m9m7NoHQn1K50tGfgYcc7Twc7AjOw4jABgZhu2OdANc7JWSpxMCyt3gft+oIk

Wi61sXtzhS28bu/Nsyi2Niqeg56XFRwyp0py58U9R0NXvUMudsHUYT3ZnTOxor3sSr9aphOTv7ScC/2B

F0le6KbB6IBNa63La0ub3P3BtGyzMCTWST8MJHzy7g
VffofEYea23G5hd8JcVk/sKvdfFJMHij2V9HIjE8OVdR4B6CDbR7F6pqEOanTAfVs+7cJZ2uysqzHfo6

//0I+WlAs6sItcquAFdpgWbFfu5aKxCv9OG85ZMGoQNYa4DtkDrWSTXN0vPtBIjfiuiQElGuhasjnivk

2fpyH3JA36HcI3dw1tmQHzyJ0qf7AfO7MH0rzHa8xX
uVOtVshU8TjTGU/6RXU6N5vJRgfHOBDCX6EO1KpiI4MS01iJq2BArHadg71BVE4l81VAfISF58fGnxUH

mTRtpahcT94t2Ohw43SU+gT10D2HpNx6UfgCJi4VpAG71IzdtH/IHZQi0jUvoPgsU7BlpWkPGJ3tF/i1

SOh+10dAmp/R5DzutEyXU5WJKZ5N7i8IOslPJYlq37
QBcvSDTZ7QJtiWVWe6fmTSR99IGxFubt/reRjAfLfxelJqilCSstsV7XKD7lLCkEJsOnAYCPN8Qq2pib

5myLF2/kVqZifrCunr39zTwAdJ7N6hkT5BlypU/YyY4QaA5e4CjcELzxU2uH7RZcWHkE3CbY6dhM+ngq

pc+AE+8IgZdGeIQJTG7xQMMqQ6bgnvB1QHbmXtyjyR
HHrTvaLwt/9DwviDC5adSYcV6OpZCQtGpceKr5cupZqo4vBG85yzNSuvlp60bjvkcpHedtrSnnW0RhD5

Z/NBqsz369Va2qfGt6vFmoKskouOxeGGh/Y5NHTi7ok7pEbULOzf+IQyoFfYhpmZGU2E36hDW582rZum

ck3qqdW9D47+WctSjn9yB4eGu3q5N57r7vLFZ6PPu4
5SdN1i2JKXOLC3swclEKDfx75SqngktGg0IZV/HEFGkCLHQjwv70FRWB+4iggZBd4z6DLvKW+deaBLrC

bYsh5Bn+qSFBrRf72LB49UDSq12tpvNdGsExHFz/kzW9eil9kL9Zbcm6HmQiQnGIcBJDagFCNHc1B6a2

x2hNHlf4B79XxG9dhxJsODc/Dk8qNspUmkfhtbxiPm
w8tdm3BkxZS/vSM0K2pDwJ0NW9xyPm7ln1Ndj/XCK1XUxMGltU9LkeX4pAn+A1bo0WVqu8BD0JtDtDkk

Rtnl94XZitSfzqaXHyUcPM9Iy7SYROor3zXo7xWBlD9PbEE1Gh9eifHaNo0NYB+2aOzTKeET4JKPEuzn

pPdu1HQb+dGEgeQeEmZWMcL7vzMs79D+LWzdhVfqS+
uYFE9G2vAtxoYsaAXrt0Yk6JdG1MGem/PUFs8fgviLqarpqviX+QCvUCeSkouIqdvMal0wrPirDysvYs

GlkWoFp38Ou0Tn+hyvitm+GqSJ/j23ByaDV8u1RJ23wOliXwxYmQQjEI3fpfL+kwU89CwcZTiM/pxbcW

K8ngvufhAJ5Is5LLjvvpwQaxuwEWnHWwtXsDgb6PhJ
B7XnjPiGvjtZLIT7iGLJpQgs/LYDnOBxIRaaRFDLeb5Dx/3EsDXIzD0XUuB+dge8mylEH5JvmNSdL9/z

Uok0CJJo2xdjL9YW0uX5ChIYC8XV6liKA+IjLyUISjYsF8FoMHkXH14DJY1jOtcJm6EImFlRwiPlwAUb

DbxKRc1yMdlcAwHrVYzJL1HylEQg1qbqmXvPpjlYoZ
D9+rN0RucFrCTTqoSiClyMyFyGWxECmy4bDKR3p+QzJ6WOcisE00Pfr0DpTP0uhxrbekYLmUJCJepG1O

+cLLF20tmtoIeAeY0JVUQS7dwHpRYDrAyIfqGbRHWqpjZOMOwjemYrvVY4bzbZSgkZIy4HixiwHtiP5b

1dVbCItZr1IKIsSObQDHh9Yp/00jGbNRszf+DJMezp
N7t99efOLqxRDDtcr8+tnI8HvB72IIBy6wXDU4qdU8E7E8CNlFhO5lKyxV0vxy5aVrS2ASfz179Zd/bJ

pNoO/maMEslvr3tADHhlhSAxHDWaawIHC90twKACffL5/4iRpl1/oRLNNMlIF9brM2ELgJBxbdXB2G0I

F4cAaX9p7qv+Jqp8iHNJZeuBL3/svEUySIvKQU1lfh
phwtKiqhZg7HBNk2FlfnpbL2B5snhFItfI8E5XAq4z+snmtBX1yHkvmW9DvRJkoizqZ+qDoo0LNFsnyq

nj04MxkSHwLm1bwI1lgpEqRSz7/lboBITDdv9l4y9UHhmz/BsZFufJA86qeLNuGUexXhIr+aDVlxMGp5

84KzRNyrefDii2CF37LBsQ9lOtmXV+UGzzk0KCMKDW
hgkohK3c/RKs1p6p8ENjtMBFWvgcdtnfhm7cHDEA1MEXWWaFoPLJhexOdOyf7kAMlu+302yTUsYRuyxI

tID8GCZAR8lEe5yzBW6qj6B0STsVxS0rM6sjJOzkt6mwKvgVQWED2dczcbv1SSzBHpGRcaTQOEEvJQQA

mNf+HcfPot9w+xfQdfnQp6+httOBffGXXiFcrYTLd+
gTqg8FpwSIYEhRQlje1ZQmHlA+QnD+20qCXGbA03iWefke+aG7f1Sq487e2Ziq99e6HJ6KNhstOuJlQa

319bbmRCFaxkGLn3Lu/9+YUgrhV+clFJuGrXsTKYeME6GOA2qVOYd2fEd1ip1x8cHuVm7By94mCzxmrt

A7ftgC/QN+6B/ehDNxuIOeWLbAMnwCmFOHrRyKQ2TM
HFe++fpFqqmKj5hOBW0oE0Bk9YY5SS/85hFVApAQ2WGCTy5im1Ql1rPDCYIAheR+Q9RzfPquK3h/0cHR

CD7p6E/MLO8Z/xjfsmBqyxZVDt0RK3E7rzFaN23o5ME/aEEagHms5p0pl2dWQRzWdTtpg7tSSTSC0wfM

vd8TJkjxD/BmtibGptEOdnVs3H9rGlSFzwlEPEpSzo
DicFD2RkBMRmeYGujtP1IlgmLO4lTvoB1aybpO1rvRvf1vFXsvROakT9pR+1+M9AiaoA/JUodN8+ieHI

+e1LwD4ask/kYyBtyntWjaurojUfHjS/8Sk7C8dXjBF5MdQlflP++cM0fYDDNvDa28hr9EFrv/9ES4tw

ungpM7srEZHoWN9ntS82dcYGWVkXq57F6ZK9+YFsmG
jiO9nV3HTcXa0MP3UVE9pZ5JMvheioYWgXln+RMMXe+BtYIrm/mKrxC+Q/qoSYkyIq+XktUWCXEWBznb

Io80kKMdEukZbDbYn4qHFVyYBR6sLczI9t7zZg+wJbAUYT2XwPnGu2o33PaOYBb/5u4d52rFacwZ+Rdn

O7bZXk2ZPz4yIGDLjB0d7MX/gJL/nMhBGmOLGHpXYX
crtjaB9M7b7RfARkSWMJlhcM8VafUvuNl3qnvzjRU+XWbzGPgr7ySeR66A92M5xbVh0JH+IuXVp3StCD

TQJE0n9BplE/1Eul78Q7rT57DdLoUgp6qBmEWGscjvjSyeIYEUhLN2jyXPp5WUWC+gJs/brZ0eHv+682

LSMxt0/Me3aX+vRCeZsA0WFMgVsgEl/RPBbIY2bDu6
eKNTcMJqDgYFCnKDRL9nwVLGAcqM733QvoslNl7dn4XDnWcal443H8BSIhtRXVL1xE6Ocm59xlPhmjPj

6d1juLUac9q+M3KhhnTiAa8z0e0snmN3reb37HW28JGtzmJacZQM68wqCDaBM6TU7DEuonx9U65xbpuz

dctIm8L0S7GsSzwFHrktlIq3CKIbnLs9QZWtEq6E2A
GeozZEkSgVsLUzJhDehYM1oh9yFULFp0IthNZ0pZhdOb1f4/JqcLYbVJ0DBkRDNz7T8WjnNizo26XOQs

w7lzBd/UuIBhpIugG1QSX0pKMPGkZYVrMaLjUTFo+0h7Fqol7dWIkK5D7eMGPyXwnB807gKbKqEMPjiX

gZTTzM+pDn8RFJqBDD5ZKqNWzOylT+Dxg1/hMgoPrq
yZ5kTXsFwQ38SfDKKnSE0ecqBPf2UP/dP8ovFKPN/qX0sqrKHJWU0vZmeUeigi+7Q9BqsRgLnqJXhzP4

NsS2U/1gER9J5rpc4JFHMOKPVPCULbDi/K9M4HCZJd667DFoserAr5Ox2W563NY5CYtC5A+zGnlTtTaG

3g4Wb3oT5DYneeAvbwBHbUghFicf38I4IdhAuqPv+r
zOun8+yzxIq19DB2VbCuolhozCh+yidasSwtvktisZe57jOD++5nskmxLZyjfTw3zkiy/hq/25DEeGj5

xUy618S6darabKJziWB+joX+ys+t15lyxZE48ZYsP27UqRHjcVr8zev3o10RA9zwFlYa1Kf9Dg693o1A

wlrz800EK/WgEc2KZYi4g8k0PUdONm03FR0vs/L+G+
17mHXK5gCYimo8MgCTIvuAQ2LTKnPq8fdlJk92Q52wNLkVeQ2OezBO/+rdi+88dwpXuqC9IdMjlpFD+p

Akanksha+6f/q/iY7kAs61vDv0O2WfvYHzdGF2ZqinSf7SQdlUrlXo/DnygalpqzFxyeoVRvyYG7vCZgXBZ+0

XCUdWCjkiZHpYmWeTV5wex3Yv1rbgWNB3HaGuipCDL
zBQVzoEQmH0DRnr4VU1WTIksEkOa6HCN01X6xU3gY22tvEMIMop4XH3n3IkJrR6INYVF5BamqAaQPt8q

jwPvLR8RoP8De7ATEMnEEnAh5xZStwJEJSPMZLcV3oCcBSZe4UsRiCCqcz+sHaY5kq6Vi1OIPMds26Qa

G1YUldDdly9tbz5PRit8gS1olLOmbitisTJdSDK+Ax
a4tDNerSdyfXJoVzmygzidn8AJu0/td33+Xr0DRvKLA/ZeNiXh7744zKX6DlEwVaQbmcaaU/oFo33Cbv

XS4ue+mgu4Y3uxuJYkBphI6T4j1SMSFAQgtogpx5bBnE915w7m99aeP8zScxXLu/TKIpsiZB+daUMT9f

sr7nWFSJzlsD0itjePajTTLU7VQ1f1zK9CArMvkn5+
i/mBahrpgUS4bA5JruQTBUQQpy6dG0Hno80gvmzb1/3Z20xVcBqrSjZAspj97GfYLVfiRcimpX2kQzW3

WyMLncnIY2QOZvi7ZmDX5ZaIgk8HQx4AQLeFGECPyOsRJaJVxq4m9+aK+J/j9MnlQ5PA1fePa8boX2CP

ghHQK5c0yx1LLp5GpYds7xwpHjXZIWlPratODr89Hq
G5FTjjl9Tnix8TqgHpmPWgHn40MVj8xq3jaYrR7jJdNQVrt3PN9bj3iOEbCrojdrRmORQG/fQBHYMccD

eeo2j1nkaSt59n1lRntgvxe6AgnovXw4FMw1ZmjBpqjwEUegXFjbYHgaMGSyu10/Mt+FF3G1Igv8gwRP

XZ42Llt3OU1TwjxDaWQWbMkEMR0WDNNYtiyPAlkt3Y
o3ZvN7HRKwpAcydZgO72xUmVTZwWhUnvS78bNCEv9C3aLPsgRhg0glESQ63oXspwcwFL5lAkBOiIScTC

+ZzDV1OZpMEwEPJ/cVcLC5t3R9yulR/SompwNtBwfWE3n5WpjDI/t3bP9d9bd8DlV/0GUSOcvPXNNl9e

prdohfbbSAcVI8e3lsE9/HyEqdw+z2/VkM90yBKaTn
QJ0evRp/PdfC4iv9dxoG5w8sDgE/K6lTwo8WavjqmeUbWdlfSo9kq+JlIg6DsJ9bDwT4+cCcct9b/eG8

gbzpH6Q1UgdQmbZm/RHBfa2bscqYDZRFku3P/0uxB7YLrNo5WEh0wbZ46qbklfIwYHV5wI/zr5MJql8K

FKGIoFubnu42r+xe0Z3awE0YUmoGWhkB1YPUvl5UTn
/0AtZ7yM4oLyLpcQmJYzf6R6WXXr9giLQTh7XO9/mdettupduUWBOvxr0zSbmqCQxbU3TBwgW+7WV65r

pvpDoECpS0mDdgdAMZBHy2NFpc84MC6UUd5G3j9ToowXFAs27m7FSJHQmQdfCiIeyzss4qdFtcYpZbA1

mL9RxJ4uYVQwCSr45V6Y1SNmRZ3IOOWMLKQ/isEBdU
/qdOVHy6ID+dbOtwtTE9ZmRA7YRdgs7qmKe+QoSB9mXLG5YwdtzhrwCXHSZTPTaK19W46DudnobDJNyu

ZXEvVoZs6DdP/LVszwreqaB0vsO3TSRhh40dmxCUH35ofletvdY9kKGt7YBXmaGs2M7f8Ow47esfILfX

IJKPJNelOEBOuAvZKV/toiBLM9ldY7kn1No/DBQzAf
wkr4n0bKF2rOfDqJpNRHYu4GUYCKjgx0vV+NdWCnpKy/Y6qbUYDnPBTT8cWqaETZayVcLEoJ00tcuHn6

i4WMqQw2UQV4BG49C4bSXLHPo9aOngIOHlpdfZOTd7Q76ngm0QS8SBg8YJsVFevap9H9E2t+SNEcqfqD

4j8MacmoTCHlaEPT64vX4jOIuhhq7jtQwPZ4+iJM7F
v+0mgZcG89uBg1RCk60YoK05fM8mOzENsIw30tVXelsnN2MtRvsdyLuShPtWtClNQhpiOnfiAEXHTh6h

Igc3hhlWrhGmsPT6DyeGNcAfAz+89Zhi8QnQBo4P9Y1TC03XGfgzsShM33PAYbZRPQNyeeJwoF7jS+ud

blfirt2+3lGzjpLFIwW839A+/LWIyJdwUOvNBBPyd8
AvQvNZSJi62HMed0X0vCXOlA89Z03sP9W5bKP7tyax+uKJt+7sbUnkLiUhdAZDTpRginO3GEUf+WZsFK

ISBjU3QLQBA3eoSJm5j5SPd9uVoxYro8Nsc8gdrnO2XtFlgyjU2Mp4KQ4BDz2+xxeSVR02mgqd4jR9pQ

Ui5WuL5KAPGSbEnii3kF/lALwDB2Jmw7bEt7MQjAOq
oXywrOB6WUetxbfZrjhck+UGRmL6uKrXUIxep3fuPQr7iDXEeESbnP8zU+TzTpqrSpWdA/Xxm35vIapN

6I2e5F/1NDHDlP7Lqvt8TdbbH7Iym7PQU2WnjJGBhi+jzkLc3SIc9AzvZoY8rgj9Zie+e1oSMQQFltlr

91/DYAQLdeSdsn7cLoK4aDK1Gvu74TYuZREtBaDtIg
ldxaFvvPntrR/YjoCr1fY3d4bAWZ28GHfupiHc2jmKhagVuaoujrvB5L0b7kqJz76Zeb762aFAOWB1I2

kv4NVnr6Z8UO4RwGBHVgtNd+BNUVrV+spO7MK8NO65fFgsl9mxmn7stSpnlIQ5PdnwIbG/P8CMEkBEcb

W6TrHE/7JpcAUB3mMzRkUj0ti9qAiIPKw9DrjnpeWd
QMQaKevXIi/zIYx+lwjdwHOwy9KKWeYE13x9K1WWIofYU7/ycIZEhc+ONrWhJDX50Q/1Ulh6YnUdaJKJ

AjlwZ8N6Xw6/R+d2IunTw3qeTVuCHvzYNH0zSbBIOJxa4uYdNdIY5hp92g+WbfWuDU8R+R9LJAXtYtZW

0S2wQwPcB13RHkbH97dBoH2ExnV22elKVQw/ANo11J
q4Ufb7v1unfstUoaVo9IwxQL+pu1kLUZt1D//wU3AqJNTEJlSkMlyr/zMeDbcU42EPWNmIxmNYzEKMEI

N8klrh//7KmPHGMIRpcCh1NeJc3AhI2RrzSPLpVlNqc5S0jycg1CDCzXYkULJjp8WoxfcafXGf2N/fVy

tCdmtWHc9H2wkzPo1p/TE0y2t3DfjdXVnBQaQ1+N9h
Fgb/zTLPouNVvtM3pq17a0LZ6j2OeB8amk3PZlNfxV16abqMV+sdTN1W/rvElbONcv8bwL/7mczvLA1L

Hcab+vVL5ObcrOKqe5ITpXsuAijwWvCOIyWUNkTf3s0I8tzh8rFdLfxSm1U1tv09My27tvTEdcyjUksd

RZq+zLzSecHCPTVa/vLz8EXWZZvkisEl3Qsfbk368L
opysLWrDwneNK5wqD30NAS5/qiqPjSsYGrd/NlFdQRi2+2jegOGfVtgDgdD02x1JnW9514reIiM1FWvT

rdJ1eDoV8Ljq4h98XGD/rFaHAjDaju0d0ib4snFwtkczIbASz4Dg9UkfmpOtThDxnA2hZ7tvka2Sbscu

5vvLcZHLhAEINHUo0r35a2eRDEqtIpZbF3yufBOria
ltwt6LzmlkZn7DbNETH4YaMftYFSXr4qBniDFkbDJ/aZg7CW31WZ4nVJnGaJC0asV7/euE4Qs9f3Ykk7

50l2I3Mb6Iq+SqmaWUu1adN6TX9O2PkiNeimLibimAgVGcqAiYRXMpsT2kjeQ+qYsgeUtZN6CW7QzfTp

x9Ky6M+Fzq2LdYgI68LifoXhh5XhnNrNww6QwK3UnY
HryF3Qn/oZlbJuhN8zX1fcOIqZtHEMsu5eptV+/oUol7WzJS1Wu7dhyvgz3806i5lLtkfDMssh6Ste7D

OqL9Pzq0Zen8zjR99XuY7BxbxcW/DtMsv9gc26phY2dgLs8thvzZVhsa+mxtlSUIUZA6zvPYUHnXbRYK

LaGwzkp6gj2AZWFCFvxglWEqfzTye51t+Cgi3ngtQX
f+WA5S2vD9Sf0+dkBf3T8CBQLMQKgTL847Epa3oD2tcVvTHYWq4AiEtJ0LRE6E+LcZOVrjlcDdPQ8i65

EEPZtMk2LQ+8yRkb0h6MDxF4Rv68rcyu6aanH2MDqEvroYheKR11Jumsjn8+GgsMk+W549NGWpgv+OEs

/3awxEnQXW3WjrLz7QF35Gjwv5Mmnj4xrT/dM1fRzC
GADfmFTy8eb01Rwzc6X+s0l1mJuamCYU03LPROgMNorG8npmSi56Gtena/8WfTp7+oui6lQ8N0Q5aLOv

6zZIdG+DYxZtArUitnSkDiav/KL2kfAqj0BLdqKB72dQp+GoQ4qwbhGBWnWUO959brtgZ7kdgZtjKG4y

zcHWFVTHU63+NaIW1jOTzGBlT2T6t/u7ny14bTa1s2
4nqh+yNreBjslplEQf6Ff7snCU3KSWh1JNkzMLzozNntZsmVhUBZ+26ZVjvydTgFKAg1qv/kyGSuS6Kb

EHKbpR/4u0oB6Q1lssMps8pis+ZBvrMk+sBWxgdxKq09hfMWfxo/+LF/fmH/UpkkVc6DrATvLaY1YORT

PcdT+m9Aj3+KizDSAOhpI56pulrUeunsEuSWLFoANX
mbflkbQs41rhy/MIpJq5DclGlQD3jB+JX2oA1Vxxm22ifsLCQ5rlNQYhqoEqXdTJjXHWrJzJ3Ok4Y2uX

L5f0jviEioK0nbNrvHrfHkaIL4f/rPeUwujHsS+QbUGOaxwK5+5rUvmNsspyZGeQFyv6jY8cX72ZvO1C

rVZRoaKbjHRUvq47KQtLXkzQHPaow5KHfkRxmCM02u
8V/vGO32Y/XdQrp7mlt/XRg7eNE+A7/2h1SJ4h1c2uiyQ2wu2AfVwSl5hqa9C4SuHD8PUcG/DnSTO7IN

kOj1zvHYvdmjteDa359TIggXdkIprLiWbGX3clrISghRw36RqziGuGL+aYjq+PY8ttcfPGWtHHsdXVQe

Iu42IwY3w2PnY+TLG5FowcCVOJ8rm625nNxOSk5GUs
7EUzIZObl8QuruIFwwBASPtGUvXxnlXoKL+25olRcU9WahOw91rtWyGx0AERCpHSGHx7IIPBs95Mn+Vh

r4Cyg7d+AhA2x50kSUcm1lfKXlT0/pc+IMd3Nlr/g0eHhcEJevdy2z6GOp/Z0kg2S9qFhJOrT4lodOS8

U/N15B+GSVDHDt6vOyl0Q0zfB41EhHvAIoLNMl+brq
fZvJI8v5DcGA13r/8UatqM4SlgsLbJwyPHA0em1+tRX4MApBaZIPWr7OpnGjBArOwrt2II6OAGuu2cHF

2RD8yxTQdhpeiPK6I0fEtDcBPFTpKfOY+tLmMCwHxh3Sqq9E0DdikT2oQcEQYtson4Fnh9ldPw+/3tU0

CVz8us3dDoPRwCK+r4mQy5r3PJjYqvCoY7kUUmdSMx
Uo246efcPtXwuBQIrYZs+T4y2yXD4DN11k5QcNvIefrICspdtUcRS0LLCzw2t2S0oxbk+d+ad36Pwv8y

PEj/SrgcY9KvaPdHN9qggXC+c569Wv74ocBnW5O2poWTJ5J+JDzpGEAgRvr4P4IuOD0mprHS+s+N2Pmv

YiLKkIQJT4XgrAU/zvtWGAP7IN3l/XfNN5Nt8xlRV7
P3YIKidWzcd3PlnOiAzToSKnwXdXw5Ua4KWv/l1LFDJF5ITNWbvpV6ysS2YCQ5XhAm9QGfyEceWlPdwR

/ucayWjAt6sRk6LELdXyh1/yFE8PNkOstFnVW6ePzh7t9ubGkivmVU2bkgjGKn85LVnhGQSyFvWOLA3u

0VuEiMLl8KFGN6q89ZRUfB8mrfk8dF1943rI9b2uIS
pdDcLvI0WXifQd0yXUAMJ5LCt/GP/cp+eO9YQxjpUlCB3gotWtwTCl69oNeatkHWb/3NWu6UIHr3ILA8

i+7v73FhZZ8gpdEkLU2yN6IW/3LUVl0XluMJQUvX89FL2KzCaUB+hwFeNEb4+TVO/m8sxTnW8kNkurXE

DeL+pbJF1sqjxQruuCWPnV5w9tO7tNI4Pxlrwgskfi
aH1gW14wHmh1sbPWKeTDNhla8OR92D4zRwgbwii68lcOIISqnU/anWivH8j8Qm8J9wTtHtih37CddpFD

iW/yANNdD4z6vaxhmaxT568BuWhF6yw4f0ErBRXX2aG70PBCg4hRjsYvLw7tvOsw/awh5froTB604+AA

YT5uPqs3dVKGmESt3BMjhKJA/jh9S1G7r75hJdHc/Q
FkIrnjjeMkFgtzm8C7rzFhJ7IaP1zG60nL79sJ+cH5AEQnpBkt4IeqFWGP4lBGW4C41T9v+d+cHrMJPk

Xaw2oPZLEUnhLV2RW6tEeoVyfbrWFd4TJ2wsIq8wq2IL4MXkONndr6P+QkThTdtJN5Q75zGQnLrqhxqF

I1YlUIt0QK1g87JXnCPasQDl68wJbtpmz0FkTYiT5x
pDfBmygjdvNGY/zEJqr8M4JptZVlBU99kFu1PauQ/pi1da+Yask5eZPE5FBbW2ZOdt1k3rbNSxcpqxrW

G7XAxD4KnS7fK7ps+IMrp2+8rzJxd7M+62C0OaPzwz3YE/q7Oj4FHxLlfYeBJfzQ35nNq9bIWwn84SWy

pSw1vjrpo3scSgDZ6W6Lv43hMTuvpt7GCoDTqs5pTF
S4KslYPXo/Nrl0n4KY6JXVfvY5uL2CqPlsCiT1qS/uDDCHZmxFwE8hudsbto2tVRJsj97N21oBrn0Xac

DvPkHf0oOIR0OmWCFdX+ZviFf8rbXX5t4mn/OrtxtCbtiD0KLm1aB2h77ZmDtAcIzykWWQ9gsLYltdnU

OzzdOUiwtdhwVk5ZukH2CALSzwq3j6tYl7eB7tswE0
NpCCkdwm7JwJPwUZ1EpV6JGjxPHqJzDuSGQJP8zY0OxuGVw1O76hHW9qVLQDsOAYKjX6U607COHVxrBG

WO3QMW2ipWcZMccRiJp1dRcrMtlcNsJD3FqZyKfwd+Cxve1OZuFrmEibljFlRy9p8ue5hDWF2xohvOkX

RBxhe+jOPH7chbICeb6kAmK0zzaHpF4TMD0DySWqbn
NTkM3yMCWsqXfnsHxWiv1NMrs37uHw5EixOVeQIl3sVIfk/q7hAuuK1QM3K8FURClkV7p1I3LwAqJ9gy

dHgM9vsVVV9esAx/PZ5UY989Vvl0zI2i2oRfs8OIMpM+BgoOlRt77yc/nq3fCN5r1YMQa923Fheiu6s6

UckdLXTQUWKvMZCLx9RO1AUiY20sZ9nN2aIY9XEEQx
ifcFfwHl6U7wK4xQnbW2RGAXJilgwV7f2lIniESlK4VRsz5mssaiBxy6TNMibkx6X68qnhYji4pJQKP9

Y9GKLdSlTBkrIepgZIw/HRdZCXgPWwW7+yhGzkug4D8LzMWCVBUijU0Md60cc1kFdqRlYS6b8APfLPE/

H1kLX2LjuvtkLJFmmPHB+KKdSuCJ11+nY3oylkJ2ZK
4kh2b+SMRw12cPtwMz76w8QytBqdfplvy+i1mN31v+D6tzvxuKc853zTg2L/5wrsMgq17IrvFH2zTV9h

mvR0C+3JK1UJjSNR4CAeerbh748D83LSRXV4zJqzcbtfhmqKc+ZqeWqbQyBh7CflAF0sTWgxu9tYo+eG

756Yg/BJTIuGmONlUKW0UlRhhJrEDCpUqhBweMz7En
C9Dzknlc59yNrzECtmraL2tWCFs57Tr/8kdC/RWlvLHjnKuY1hQx0cA54+n466jABfjRDCrFZREy575d

8vnsuIJhKuRZrHUj0c8T+foZWLvRhy7N+GHlr5Qdi7DlQfbSE/JqnomGEcUu0DXJo7ykx1u2DjAldj20

mQWEu0HjKGP+a2Qj6OEDnIfGTTfOkcR0aSJEAK2YdX
Wxxd3zwIiS+b6OxM7C4FPIoXIwnOC10/ttAv71E2gGBPnCI5v5Wy+a+/6FKckWL0g0DekQECyJkJXQJv

fa1XQax4nBQ1kA3aD38GvUmVi6lrpzZ5noDSW4gOnCCmTfv0k11ksdn2V1O2mEoj2swnYp60dt4+52UY

tGgelk7/G9yI5wGfVLF+lfkLedgbQO4Ns4ojFwgAB6
ccSfs970R3gHkPCla8FwUHkoVp4DlL6Bo0ve7IYiMAwJs1RIVJIH0qgKJR3dxf/nUXGeWR38jSb5kUJb

szwrieInATf2hghjQUgVxQ046hC/URfKHlID1+yvE7QcKZHYm6CZGbMKb+Nio3cgh7ZRy2IiPEQXx5pC

IAyjjCBe1eXGBeZCa/tZZcegYmJ7VwapjaZr1qm1Hj
C/XublK1TnEJSpFRd1Jks8c5kcCNpUFrx2BHDRkmO9YUwms1mnp5HAH9SzN+DLvZPfcH32YUW1T0XJU7

blSJUyZD4v8Y1wtZKB4x57KRKTJutbN4I/6BldlhJAiDFi7mtzuqqppnvnXGNLxxWnE97DFfzo4+MV5V

E7GRoqaYVtE2ukYEfvFnpPl0wehk2WKfjYAuZcY060
74C0NWKWfftSpYnGpz5eoKbFZ8Tg4UpEpFBah2s80rlb8l3dXEHwYNsWBjPKmsABkiaJy5TPvcF296/O

A/DBMDd8rrJzC+81+nNTojkc2n8BJK8EqCbiDaRfrFRoijjrNwEjg5mFoH8Ia+cUpyFl7XS3nG4d4xNg

98EZCH4gYgbCApLHE0kSgO9Cx66zkm+caMkd4bGa29
2aB7JF5h2XL7TS2i2okrqLs9jmToAk6xRWXPYvbxH0VMlWeHqrqEZ3C07VxL58VlW2w9cyOP5+uCI6m0

LCG4B7m+xjY5+wsBnOZjgZUcX6MJbGEmsdkrNoACkUbxdZyYdlGjQMpUm5AUtpER0vlcfQUxtV2Qv4Dh

6weNH8QqEiKmBtx/cbQhCy9NHwi8vx+NqhVjb3fKxt
5+QSbQvpSl7tn7mwy6U6NLiqdDPA/WL0DOL76rCiMCR2nI7w4+cU6n5yPzVorXb/WZAyfKQnRHDp6gmW

kOekn7LZCIbuhEZL4HOk8xcNQi8LROa35Iuyw4kBT/231RLwvAk3dgvEbjENMt095WQF+k6BMrU7+np8

yH647A7HAsbuoo9G/3PGw+bkScCMoPbNKMTbNoMEK+
q5FThMQ2hk2JODY0vUTzMuhNQ/YFR5hZW4O1QoT6ZK6sAVzZUwlYANG/PVTBUR3oIvO1eTdoztxI0Qp7

BXZWoV6sTsf1CIzsAA2npGC7ACwqDbHhgplV5s79E0ilP+Zi2mhoWIuwPHMQHcI+uHs+05CZiNpzzosA

OVzwYWzxZPhVHhBC6Jcv1c4TbmtfNOnNZ6y58NfziP
77xsYv1zOwpoKYe9wrBP7uk/KDfrDv0MqsPHvTtODMTwyIrY8gJ9GD28gb+uxawDSjzQhSKpaerUDQzI

+AMU1ZeZLQMmKfd6yFOZMzDVda/OIEBD89VnGZPk72C9hKV9mcvG2qw/Zk2pXJXtH/fxHVywvLdWb6xw

+q0rCBsnl1FJKigphSmbEigtRJdTOFB90RpMkrZZZX
rTmh3fW+ColCQGg6ZEVMQ3SHj3x+kCFHH0Xfp8KYuAC3hebcb7G2Xy8me7SrS+bx3f99LJ8dIDnJTELB

XSffLXreiyWusljxVu3pGATZHlMoXZSYvU9AWRihz4KAaPspalqgTxQ7QZSfkt5WrRj1g1kNNBF32pMb

myZuyGa7mtGo+lOC33PV98ov7qybs4c0h5xjKeTMOp
gaIuR38s/HcHMGace2wKVgl1Re0cfl4MQPKczw8p9BadESfU0WA9x8S0AY5FInJhZa4ET5wNTvizXwso

6A/pLo6RUMyFq/WrXkDbKamJr0ru+m9QJ3I9HwmUIZ4zm3V6oam5zuAwddgeqG3THGTeRlTcHXiJO9I/

cLi6rgWnhgMaTMVnNx+IyjA1593P5y+fFyy2dTqrqU
2bScbpkxD8aAidMokQQywN3QTbMViy17S4Dr12fssdgTT+fGPu/BaoU7xgEqQvsQuWk9D61xLnH7q9xq

WRNdF/aWjguD6u4Uq9d1gvlxw1BUmgTCOXIHyc9ieI2VygBcA+JWD9d68VgxKOV7HjDKO8+NPHZfzLEq

JIpD6J6ENl09mNT5D/0aH+JfHdOCAYeGvyG9vYMUVW
2yr3kEyyuiTM3TAxf1T0tQgtU/YgTep6l724naN6v8pXaPIZXPHLPykMBR4ox4CG2VBOsrEJ0PvOILOP

tgTzHzmuq2i/F2xhxEoZn+ht9A1dN3ln0S/LTV1uC0LaibY3qZO9HPlu0rX/iAU30Vw1SWxv+3FQnBct

zjV/y4psPrkMa/yHxk+bkdXbo0T6mrneiPSGj7oC+6
FNsHKhsU4L7zbnPIIlv52P4+XS8DzKrHbJy6SZrpntXj3I27ZyG6POOgwm81SoUCXiBdpDnZtbkNbGbJ

c/RgbAuuZUMWgi5Jl1mq+k+AdySgXlXlHMuepdniRxXX7IaMD6/4a58MeI6NwcWYmkFLZJ37zn5rJzvV

fu4pdkh5kJHtmVaOWip46uaGC4+ncfsJBzGAI7bHbf
gjoAmSQCezQXRUdMLc8RrbL5+e6NjsW1pEOn43wgeoTD2u/MVeN2WUX8RRoZh5cfYlzYTw9rk0I9h/fm

LnGwExA/uCISWsoKnSv2rmHPp8DlZ3QcQPo0IE2tPLN1X5zN+HMUQhxy3RwCbTC+SFj3acZGM6BCeqec

YQyWkn9Nu4+HTKqNnENdZDboIBI47/yrVAMEnn0Q6l
FPn2cQKLbofD/n5PrqPaZA5Azcgv1dGFNtexhqSrBC5hU7MejJ0vNmKqumcxh+NOVZ+fb2na24njJDUB

kH7Uo1O803BCfP6X3WwjvVfLYK3s9k9Na1+JZV+PjmK/VB+35k/jcmaOhyRIWR+ayDEhnibnOWiW+zWu

iQvc352Ih9rKcYxQaWOUphmClqVfm6+hGsGYbd1mdd
gmSDaF4m/m2I0otb1nBR6pyb1Bgm+tGoVtbGCwMR3E0FySJmdIMWia/hGQc0oadreSyycXdeIqfzaFLm

8xmyx7fbWJ3MW5n7EAn6C7Ms4n1hSGkl7Uk2lSn16KYw1IqZFu068E8UeS2wjRL7ioFwB95faRUhd4A8

+R+NncTcSkq3AixayiVbun+Yw2ZCRAdAoA/+p9TMpZ
lG+XlFVUvaSovnfpkf/Pkm/4lvyfMkI93/pdCHR9iGDBPni2XbyY0aiQiRjS+8IstjRoQ+vywGf5Tt0z

GTfd5CK8neNcj/htXloB3oDLnuc0s70tcWMS1e14x2+grMsFVFjYgYUyx544Q9jg5YwRU8F+2FCyda1/

q1rs6vf4fPQDf5j/JIxxQSqYqlUQmqPy+wCtyXl7zd
iOFQUC6o0RdTpLtSADvuj8Qj6KlTSc68ENUmRWyaKI4IeWXe4UkbMTmuGXZ/ZZhnUAlj+4ZGXj/V+AfL

T3UpCcN08o11vqaiKKZyP7pA3cix14LMF6/hUlV3sArgF9oVvzSUDi/2PF76I6VKl1oeCoL77qobiNUF

9+pRZ7N1QtkgigiKqUHykCkeK9cb1QHYhwQhxVRo72
Q4Q2hQ7wgtkxXL0R5DEQHp1bpcyEOZhFBNy5ZcLc1mxO2gzhK01O4m+i3Mlkpsz8H4Lw4aEvL6ia0HXN

a8bXzaUrM8oWAafMfIREOO/sAeygvT4wXPy2Fm2dUjS5kPioNCmvDAxySdbGyL9BZYhmeCz21QDAcbX1

5Ql2DAlK3n8F/YYh3t5WKN8K/4+lq8/wTIzS0wg7F8
mu9LCUex5MTi86nlP5G78LBD4ytCTDp7//o8/vhnmp3RDbJY3qvK1sPBSDt0Z1o/Tao4L1dwlr0G9qyv

HEZURZen7+idZAD9fkH0+xRtiBA3hP79g76kYJ7KmRYLtE61xwbhQ/McrmLgDxWEfBBqdWI46E66Q4pW

Ijpfdazmrkk6wqQXZkGlvXFKG4JqM6vc604vEQAZa4
W6wHmC0Fo6ZqCAw+/sHv8ghGMGSiqJ3lyNfUVfOfLsd00yp+At+mbqrhGziCs3TEiIS5EAj9E58fE+EO

IX00oRVbDQvrSexnVGKQWursQoWNWE+J8h7SD8PUZv/OevwTXt092/CyF6TgHwIqQ3eKXdU175zcRzQz

RPE/+B/8D/1p8QNH0ja07FI9p8940q8ORNyQeqbV52
BMDmzuCD5pdFfIDPXtzkxjCU8e2IAti+UJ+Slk4Gt1A+iMJRNmWx6IqRSVitR3boULqzwkIciS16wcK2

u4xSg/FcKQBfUD/WMnZg383SrKuKQtsc3674sys4GwFFkWe5XVSF6bLXemHCL7AQqeJS0gVDG3GKfGWw

hpYp60jXAELVMdZVMN8xezT9MI6T5gdNSg7hzPHq1u
g77xc+a1mHw4yO4cG1hstkZUuAlFMzL9pryK4DwZDtBpEEukr8kjaMQOAJIZwWqY77tgmgWKQufdSxWW

0n9o8vQQtcaXkVjEFuGUj8bNV2ZQk6PQ0BYsYew8OcxCfXndKA3ZPncXhavJvYXKNP60ac4fBkv09nYf

kL5f/anHUyO5Ei4e+/xh7Jq4ly8Ky0CwlGmULJspLt
0NqiafCq9E5ujt8OI3oqRwqSkNjy8P36sJg9tCmKykGZ0GjvAiUEEYOQcMQYN5qScgARe2SbURz2SA04

/Ivvv/SHg+jCYjJIKsNbXI23sigb9u6oXsTN3IHmFegKigt0gKytCbIzFjASf+a1uzf56y3L/vrCMJxB

BR0IcX1z84CcYf4Dfra6f1in0/M6wdcYkv+UoIK+q+
hV1QJxN6jtb+gqX1cjBmyMLgYyJlBeDYbeFxJxK5QRVlrhWBgp0Ilb7+LdS3FupIX0O0SsCtG2c9BPnr

1YXDF8NG2BJbuFAfSF5qNOBHrcGEwXA03BfEVcNtC+ndz4FAraYoxYQKtTlz7jC6IrjnnwfDA61CIV3s

PtxLFE40S4iCz0vEGqkohuspHRYGN/MqX7COhuHvJJ
nesKnwX5lWuvo8IZcehkDMaE8QXvawWbBHALx7+DeKtjgoXq5wG8xcjEF92utZ59frKHP4Vqh1dT1jbd

bJTl2adoZlOtP/EsRy+ck3tOEOuxPbqjsvmSN0tfVByL/UeATq/T/o1RU2r0kmAgHHovQrEweKWAzA5Q

iurLJL2UXmsBF/kMpLHwQ+ocfTEYzyeDBx9fO43nHB
LOKXtwr03NvCvOeDvPC6sG3v3NeqRJ+cwYv0feGcrRzxnIi/Cg8B7xu/hHgSNLK6yhLZvY6HC2lW2hcU

Cc+xESNqN08ODFJVczaF311KqD2UIe+qoT95pQmisqnVSB+28fv76UGNewlY0H6dD+gekThh1IS0zRlM

Y1SnwmB94bCHKOnTzryGTk8r2SR0Gr3jBshgq6zL7W
LThsmu5MRuz70zUHCmiI/JgB6yNQo41begIz/RsAbHuyMWDATjY2G55SCNTbW4SElxT87WLh8LBhvvGf

u+fPKpNHdlW631R6sdGSJ3aqYOQVLrxyf4jkCsEgPNthhfDZY2hex0adj8ZEE9/U7iMKrHmo4wuZp8zn

ybSCLSDg8l0zEZLYNKu0obrUfFdeZCgyxxPQRIkq84
jMQxcEQbXpL2aytnsL26mEZXE6U6r9AtCvK/jLMpQFRHKPaiykKRF9A7uJHEkwlOpC2a6bdN/Th6VAT9

7L+UmWmGcMl04yTUdK3FVddS+qne8QK2D5nU3Ij2CqGjObMsvL9YpGohjobsg7AdeAvrDoylHWjGjUy+

tQDyC0E8LQmtrvBUfvfs18+wPWinQ0hhWFr+U6RKKz
d7u3ygKyXlzVXjETrYC4COi8bsvf31p9KHu19CzoPCTECBGiY72fwhsWl9e7GzYt7V/257PSPasBp/Hv

u6Cm6Bus23WQmDxZvo/hFAbfkwuWLQnza/E8r3K+pdakebv20kK0fNC1H+E8i1eKwLLVlutkxhxHnS8q

eligv2pFX4nN9zqjD2iVYBx7Qm4ZfJUX1W7cdBfR52
c1469wFjVZirej91MasZc2Gab2ag8R/D9NgOD1B1M67ULY2fTummj+3v3plY4b9E2fWX2NL97n8nfoel

CV28B23MbY4CPKs93Jqtqzm07UI8y7dSFLVeTcIQGzL42kWMRS945snt5WnvLdSv9vG1fmdaKfr6s8uU

kFNSrVvh/+2k5pW60//w7Uf/a0I8yX5xOOTkNVai7e
2cPJUI9SpTsokA1STimNgu3aWhi7rB0PjXQLHinIc822lhFvCwYoEv+pDGSDR/2IG487Y9EE0MjNgdc7

QjOaHcJQalOBnA1UAM0+SNq+LRraMqR4+boSbRUZbqvOdnk40wUYQTTZ3MIpZR1UeVmKDImfgbp8tPk8

LF6YtL97H8ovmk8us+NcmnPT8AKtGllQgyanD67ZOw
9IoXIvdN1UDZcx/40UjfJKTWP5fIQIhfy1ToNJIFEJzVERsoF0FLbKrU1tTiqBBkhxX69Xc4KXnXUAld

DLdm9V818KF2kHsfUOcQ0NI/LVFuanF/v7uQ1+lKxsiHMG2aeFSIQ+tIiDc3X9xdx1glL7Jv+e/DvbiW

/7+Ls0bfl5xhOM3oROH5yHKq8/GsoJAcZAZK/rYiA6
HfutKGj9xBESXvcM8Cgmr5D26XL25AffOxZQHpJNw/26vmw1fhcSKe4mFehqRB2ZyMElURmOo1CpUr5v

Q6nubYMk+tY7kW3o8ES2rAilJXGf8RnqnZh5reELBlMLWfIzJ2RpqfhaSaccsWo/uENs7AEJEb4tIDNM

mV4N+rMd9qIGD83fAehwJbKe4VObmg0VeP8cBImedR
OiCdYSOfU7oBkYVzjg3PJ938OqBY9t+PgP+M0mTP5Tv+UpdPnEx4uzO2DN/X5wmn28DVClRmTYBgcYw2

WvaLAwQHP9AS0mK0bnTyDK1bynRJ3XrKqdevFMZIk0l/KGJ8i6UldgTS/2n4lbfB+9nWhYdgCUGCuwSX

nPL1UVlFBsTJYxuB8NK6lmUBDOqNHCB2D2n7ZN8YBx
/AAC/C106nikrjldhj+353Uq11/a8G7Yfp4wJ2Fi8Xp+0d5zveNa99uNaDpERc7bER++SeztFtix4yf1

4ZwM5BQaEi3IYYIZvHYfZo7CecqWsEOZDiEgQpTpsibhHgCFr7O8VQfJG34xeTN78Fggfe5DWu6+/sB9

vsTWaU8q5dogqhtX1mshxMlmpGbmwVy0t2KZifxKgw
e8xUhPzGzJnbmaIha8rSXmoxRFdwL1jk2bUFmbGwn8Nro6cQaPTQoUuLRfj6QGyd81dIliDd/a2lL2vE

HbBTg9CLhj1w0AUydbUNK7NIIcY1A6Gb4to473BIcQdsZ8bCxWVw7/6cCF7/PhFq/mbynD1XkaEQg//l

jWkCpMj/NBx/Cv7rXdK0jm/SMxvV/5L+vwP+v+t3Wm
X3AEfs2LMRL3QW6qM15oT1dx9KEhqyJ7apk69jRsrCOFr/SQFCMDa7vP9+PS5bk5VgOyeQSt3AF6Rbgi

QFB+hqDYSj+BesNVO+nQ3rIvP5TLX/+CdkU7zkTrM7n3uwQ4F0q7Jn6D7wVqhOswBr4oTID9Ayp9Wzmf

z28/NiuAcsWtPVhJCHxCzPbJmexfmRIh/5NT4A2+8/
FjarkOV8bU5B77fDbIhllAmoNzfJjkI7FfHMR7JfruYg9di2hts6sZdzxtTbZh9zQ6Zx70ptBBjWuFLc

+8/u+PKPIaJzgXLFsfNfd04TtuhQ43p4v39OwIckP7jpnyf3gAeTr186cPb63wJ2rAy6Iu47JR8U5D+b

fo/gxz+vk1ntTrB5hxgs4vPkLn/U+H0e4djZei3V9E
bJ8ATF1f4+GnOSTCzqP+A7/mBdzx/JdmHwGbkCyj0Y7y5nd9/hsKeR7crL9SlabBBm5Wjqx2LZtFGVo1

l8iN9CEqMvkexC5XVF3stvCYp+jXFve7ScAsy/Z+Ofjbck/9tdZwAKA1c6e13G7ue1rJ0kmsIUzUFwgW

pgAM2hw2Ga13MQVXCo8Us/W9Lm+jBzeU5S92ynBzdh
dJ48fAh1aCAS7l/CXDN/2mkY6ayYa+mTcowie29H+ggNh0CbWUIbfELxUuW3TWSoJpwiPXL8Qvxpl0wg

galfGTurN7xa4y3TREbprdsy0aBZ7l7ESRyWmyN0zTdvbuYRWVKVltkCEESQ6+AP4I3/oQMK7e5nn0/m

/NI1hJSVeUf/Ao/3J6MJs4bGuy7QJp/aw9X7MQBu/w
/qIzn/UM/9h/qGZYU+spMT/uwsbOTrIPgjOa8T0QnZDHUoVKuunv1+BcZBgw/u7Y8et7xf2pbb7odrib

XzHzIn+hq+TMFP2bPQjwNr+LvF7g+VEq2QQHik8s5al9EW/+Ba+QEY3wWp7txWzKs3j9F29rViFs9Z4H

aJwCFJv6jdsiCnydGfXhFWEWVNeAVNq6MCR/AcQ3/1
u4Y5A1AQm2NdLjBLdrF/htzC71NOw+XGS+CPhYi+Ej1wS3OPV87gZKNo5/k4zSuLKOl/dR//pf4v9X+p

/0v9/8Gy7i3F0youn8I3DLOYfFpXcOM0buFk/+9wpIAf8rUzH+RA2InGbgR03/N2NXOJNcasdGxhq6Mk

Wrl07qVly8l4HJtjJvub4aJXhOijFG1OLz0/Q15PhW
8OS4z2yz7mA+wqEcsABQVhUYjKgOMXCYAV6Mgov/LZMAZnocMIJsb2SasQmJ40bG3WgJUND+8uAcrro3

B/jROmH3fF8BpnK6eqDryfMe/hJ1hoqjQMbz5NQ3U0uhHU7e2OxY0lvobNtqFDfCCFUHzqSNpR66PDy0

G8l6BdrCDByoVFfc45QJGUOHEllAAZ2zWsN90NUGty
Fyzs/Su0Q/fNIarb9HIwF8R6yUnVW/FFwVa2HUiWwtP/Gjzi036Ivx0ESm1hlBCc7UP/XNf4rZdMearr

6jmzjJJAcZUjsSaTe40B9HDs5BaesknH+sefoE48gdnLFzAE2aKTD9AWruHnL5Tx/YlLiQCsXFR3wClh

sQyxYh5cTRDOrXIt5Os5Wijl3cRaEJBy7FhV38q1Gx
njV9kQ4byePSKZSPJFIBUY6VVs35F9stPJRY07bH8LlI6eES5Htpt10CEoltJyEHvzMKHiXRFanVDNgn

XlhhF85WoCd8Dv8oZT0ilb+MU7JMNWqqae/hvDz1TrZ1yRZeaP4+lF7ZXLxdrL87P6cUsQuNII7shDWa

77d8OP6R9AgLDvaOtSn4w/ddE0qIp0yco/B/S9n1vo
nbzKRlkIEkqn4g4ooN8Rs6pukJ2Ls4kYeaMoyolNhpwl7cYZ2neFZMPr6s8I/N2+rkevv1NMRtodzgW5

/qsA7Da68sHLKy/MKXMiUlKzRQIPMW31BkwTNU+0Rm9C1okyLuUdpkJ5m973T+WNm/frJeuC+4Zp25NE

PJxCdxO69VVYbz7RpE/7YPlwoSeKd0f0tvYHorBcUt
eBcKJxVls3pxfDCceJjh7phL4K8aEPvGtUJ+/qy3lUYyal3VVttCUqUif6E0zmFVQHYG0+WqNRNdBsTM

BpRspxxxwvCjhGWQsdKH82J8ZR9q7tjGDdb0+/2kH64FJYanUoeLbCVc6PIQbPRxVCWW+4zX0d7lnPVJ

ExRLXi9RU1fQ4BO05xd580+tlC7yRcdTmlL5kmVjrx
zsC9ytgb3t6dbVHlyuTgBYz5RZLbzDxcTv1d+pAkunFlnvrVWIIlyto/wq+jkw4y5uZtsb799MEe81uc

FpXwWb/jRE8z2g1Y1/tsQVIdKRlBYG+DObC6OAZcioGf5vimyBluBXCPyRqfJKU6HFyrKvZQD8Eas9rN

Lh0lzWcKK4mTfLnhmTObd/a/vI7hYjnhCXLX+uJXQR
qEcb/gC8ppdp6JXzMyKJh9lZq24AAnaWTHsz67BIbYaRIak+xRyt6Peb85kVhGhH5+/OUh/iMF5OVSYu

VLigLGOW1ovlOulJeXMRlSsOV5Ut4tOYGtIzZBiao8xqdkVDBV8P5E+iwna/iUCM4iaOfqeeFZaKxCId

qbAfFaCxeqyK9Y4uNZVn4b2QGyzE79TR+BE+fLb6gw
VjzWfu+oIJctc/nK1Nxpe9L1CE7YVzXJKN2erajN4QBPv6f0/RG/Lb3Urz76Q1JBjxkyuyql2ZqAekUL

n1fZhWV1+uHW9qnfEwSW7FfiBbQHQmgylZae80lv8Exyp3lvuybeiy+qiUqrLUdPbr2HI5uPgmkiIJzT

NxImprUZGARi0qerOJSZ/9EKgAbSQwdAECihHm6FGC
dbIbao5GZrUShquPEMbciWB4cLwtjuA46DfnV4Bd2iFsa/8gB4O7mEBRPjGmcC3Tjhzo443Z922nHwrs

gWeMa+yX36kBlSyFMomv4EXURiuhUVONkp5vaapYD+SYtI93iVLCWHH/7+fSdFT/LMf1E9xBU0SlEHVR

KMHoYhq3RxbzYmiNFb7hNUpKh7bsQ99P0U15uBUmhh
paek60MSzAL+Pn+iVJwT83VnZ6Xyl+52VIdjyeMGPj7WSodGbQLUyi9IY2DozyCh/Agi3YZZ2+2972vG

BPSFeJYe9hDsqpQAKU0OqA/uLMSQcwO1IiQw8GQpiSlK2yGXXhJR2A4hmdYsKNiXegweSdg+UJ+5TsbN

XJtkdMVD64sFtjsNhnu66PcQQJ2Z7lXzYyom0UM74c
Wv2fzTXmwLbgqUPjm/mLuVr8eXLqzriYckU+6ljyHDsM9Ym1VbeJC8iYi1qOkch5FKiveKuBDnVhvmoM

HOpd8oKS2faBsbFRxQaysQy39kxCmwOQABQDNh6pFAhB9O54EKaYY6Ub13DQHKN6CoIkDuANT7ZQrSAO

H7W86Hnf+GGEwtXrOmeFEL6GYnqpkeydBIEG7538g4
iZEFL9zRDRuQKIhTALuZWg+5esToSK0Bjn8RS2JG63CmP9JOQ76sJGtPy/9PCvKUIwYWaaPZy04nWQkO

uY3A+038fJ6K7gsXEKH0RJfxupriYaskvcpnnwiFEA8+FoQzbicavuO4M4J97/bCi6mCLy7gmjpCpYrf

OYPF0Ml7hpnkuW4p/2i53Rg/R4vP9YBQ6Pp+KAfm9+
jtao4jCz0D7uFARm7D/2VVadd5lwdABIhUVE+iW9nwtAwCi91Oa9tNNJGuKmr1eBJb4Iv8eSXJf3ioJk

WQ9a5bMRWlVVu39He3EKMt1iFUfuCTbCijBaB7TbxjgUH/eg1Nh3XnOqVD7sXSbRG2dTzbx3tSPZhKqC

ulRARi3PFD89sZJxYvgiDaWx00aWkVjSNajtGvC7io
V/wjszDw6oCeQZl3bqgFqLfTEXD1rB6/HqBQF/KrTrlVfKs3YXJL1KeTCiWmfCxDqiB0RLxHV1NQNxnG

KcGtjE9bogcL73KuQrqtdOPwJ85FxZOAk7diKoKO3G7o5Pjk41uWoenxu0aon6nbCwd32lSJ+IYOjsn1

c4c42RCgxheRpFTcpzf9WDo1sBbBawUffVrz4r6XI5
GKw4QxO6eaBj3ulP/Xg0AZf95halqZrr2xJTBJaEjK6ezinF5/XP6d3SXKhayz2EvIRMx7C7bNPzYRTR

ip5Mo16B0nIY44MMe33FqdPH1zn9CRCByHTSQl0ayE91XO7AH8bs9ioqlS6V2IATtAPMWQY+N7Ts2tHa

nvizQMfgfKPKyjBoW1bZoJMpFTtZnOYNJ5DMvF2Ptn
7oi+k729MMemEOFjYQHXbIdg5+zvL/LscSLa8wX61rvG1Jva55RkjXfQ8NfwDoHyMNKBuy1E4PSOV/hz

NRXLkH7BUIy40kj5yJhaaHfIZDvhwTyi07i9mvoDuS4ao/t0UA5QZyisX1do18h/pWOh1fPGDsDoMCgr

OMTt0NVuU5yonYBFL+Ie+VzgfmPUSCWlEBNHWA/BkX
H0INui3AXthnyCo58QVaXrxqzawRzE2x4JgAcRx8ZrJmYehYQ592++pQNK8dUIsD8Ddh2dJjP6oLEQkr

kJqL5uMjWypBforsP1BSoLeDkoKEY0pymGPcFJpDJ1L9Nrp8AuY8FU8KB1Y444YVr73wRMACHVKeticf

k32FW5ZXNQix1qokEOC6+TNknbPMNxPCoVLJZVQJ1s
84PiMTe8mlpoD0Wp0N3K2UcJPFEIJTPz/4/a4lhVeryO6girwm1i7Dez25bNuT34ICapZ4rB4SEb44p4

0QO82nwV0v6NK86p9iupxKxxZE236a4tE25vPQyeCuicvCd6yTd/KoKnIHMDkzQHzOqeO5Bn1o8lFJky

qbxNhU11dEDjIQsG17sdAQ8jc5qC8m29Byzps49gKP
eGzsUSJeRrOtc2dhqh2CjzwB1r9aXyqUcfoM1+OjiDB7SBdParmqxc6dCg5jahGyJZQLBD2zQXAM5sn1

+NwsIT26X3x5t8a2m4dKCw/zoQiSPF5VqWwfmcv8/TkWj0u/9ibzG4CJzYBra/3b4XIRoCin2c4pYRgS

IZLdjSbbkMg5aFRafEiJxEOyHIsj9SYq8i/4SUbr5l
mMWksWeDoIy15ZIf/EejNZwtjct2tYVjb64zAr0zMnBZOuYtrzwAsyggszYUaCcehGtyYc/v44twAtLW

+NKubfEIT/wbF6ck0v6g9i5wBN6OHlXc9c7djfACI/sfOl/dY6jFiWZeSZE1WKE5tH2VT2tUXUg68SyI

EElnk/Ai6j5jbmp60r+vyv+dyqU+Gs49aTUVaFaYxu
+es2j9VkozLZoDm6IiKXA+umOw7gVdqcJlg/yu6c+cZjJsiH6wJLEkqyJUKBqCm5//jW0R6uLFBeCpP5

l7OX1pNRO3R2Q9W1UJnqf22BJrszgLfV7lGyxBlby4Y2iCBJ2gl3xGVDqo8IASd31fYEcNkFh4rnCL81

zGvkenUUFG/OVfz2A9Jf0CfhE6xH5DkDmU1ja+dUdb
OobG+VjTLWMn24KwM+V7QBv7//h69Y/N//1l7K+6/1/IbHbpG79REN8zwzRhO7A2mMNFwSW6aP17eIjE

uD8f/iOLXcevKYJy0FGu6afj7Db/hF1I13Y0BQXu8EsFdEySTw/xpp7YlTPrktMFAl99knkSqhJCTZRo

8kScckZY98cOwwd/bmjacP+SZtGU1N3Vjw9qQBpZqL
mcuHtk5VL9HQQ+VCKRneupX1CvLNLZYUCEBQQafnXMlCNwOibekfkW0mpGtMjXHPRpNGJVgOCuYS3MsC

L/8exWlSRp/SfWESBF/tO6Q+unLQuSWmQWwrSWR7kcv+Z1EJHLl82p3i6EzqG/++TK0VepNU9ngwXv3d

IE21k/+wAjt2phjbzCD2YsTfrBym1myD76cuYU18V4
WnPCkBtr1FwXI01GzXQruk+5CbPpsZ6YlPb+2bsBPYAZ6fg15IPmyG7EgTriakUQ3j2Oh0NMl83N8L7u

mC3oBIRgrYXZZXO/tBJZsaEuXRu0LJlMwx/6gF1yLZN/pxQ8p3vomhsAR8nWakYhSBOzbo86sE9YDXpe

BueTx69WMOJdVVAx1fwyYaW8HCBm1iEMCMZWcg5A4i
P8OHhzzKzCQBjLGfbsHh0Z/WFIii2//yMo/gXbRpUHJ3iA5YPNMpxtRRKQ2Iq2sHtzA9jCNLIBT4xYgK

SlUmEyDGVQ4mR5RSo98b1SDMUXXLx02OyNHC9h34Xvw3Qdm2dHgKrxMX2oQrny/NxpPLYljGkaA4SDpo

IW6Rwp7rUm5fpJOhkTlCeisR95swZR+CouUNJTBLE0
ANcVE4J2TygJMXOkfBWQ8hVRMPIgtIgCit12K1RJyTFZoGoyMjZknnVIh0CaUqwBLC0QjcSTrQ3Ulqnc

jFqGot9M50z9YnMt+URoXRbajUG+I20SKYN+YwEI3Ect9khuYbl2EywiwPdEp5WwqzYbn9UgSyakWCBa

skGe9QytOcwhk2dHJO+ok/mSOXWwywedBbTrYAJVyf
4hNqsgBbGcdzO6AdEk8US81pKd3ZDesX2nyQJn+C66aLx+pTaxL+L0122SHH7Vp5bow6GtVgsqYoOK9R

bKKJtUwhP7/a1n1MaaRgb4CId4JeGeKtn2EfwB/f4L0kEDCiBy5g1vXekeSKaWaYsOWjxVv5RR0t3ake

d0j1iMDj9o2iGHoN3valV1al22cxuyDkyCav2HdsE5
RUFF07gHNvMUpLtg30XyWz/yqwCrXNoMt0Pfx3s8gzvgBU6pX2GJPgGXOaHLvVWO37+zOLYVX/RFB1l4

lOytsKJVrfqy8ecl7H9oyW7UI10adXUv6eO0tK62nXkrBy6LwuDwyon2asuRLOLMJloe2G7fU0dyVzdE

T6XTsHia75Wn3q9RbG+R2Zs7iodwqqDrJS+ek40mmQ
uueGbZuRl0lciVZEiOk5WkBzjgLjzpirxackHD9QaFp+S8fK94+hNd4yqf52MYwcn98QgS5Es8jmVu/0

RJSz36Yw4WFyaW/CL3pyE16iZV4meC3P2tFhS3992uR9KCUal0aTR+DL1+UNnRgXSuyQm29cOrkct7F2

xSizBNCE27IfUdYtl7++b6foE1DmW0UsM6T9gTY/Cb
6y8GyEfjr4qLfDsAyLgU1prt4mlQKAdj3uGpRjGuPx+eY9BlgZdYKDxtflUmvp3z2URGb5txX8bOUtjv

trpiv+6yv7zUx2iqGID874tjjQ5e8UYBRf5D9u7XI5tgmHQcTBwQ855QbanGbxtllxTmS+Wl/WJnywSi

PunyFLtgteO7yzzUg0bK2NKi+rb7XwAtHbEFfla3cI
T3x9VdoiTts8H89BsrvRFZ+MVJiMxmxhjSrv4fpYKbh/6kMs/EB39gO3Yv7Z4yDD3gSHUI5U4fH2tsDe

cOEW76M50Rji0ySaReiUz3rPJvJKS0COdgGz/7ICPXA97My8hfmM0uBPv39mqNhSVALttGa9u6qHypim

Ch5Y/DK4Nrd9xZU94gZPC7fnNbce7/k7FLn2jFWbSK
vYom5cG/WKsC2eniRhenQFALZvx61J8yJRRFN1p3sNGNQzNWGRZkyMEwRlaW81ZFDjqY63BY7twrV8oo

v0YC+j3mZWa/V4L5L5bFcIGMyFeTArPcY9CiKAUN4/eNkOvnh6KQ3Kpm2mcJZwnm7KskIcHLJcw2dz9a

L6X8kdaycMz8QjTaK6T5+BFCTwgma4nU4pvWSkQDI8
SE3fjE0N1bl2A/CZFztbi5uLe546KtwYutZO1+brCkSTMhdm8huCDc1UjMmuP6ocEiZV7L62RDuG+fFG

LL1A/AP8oJ+mEcHO7pF0ugbyEjIdZZQCf4S+lZswe6bXJQIz+Ce3zWWcfJ2w7uGYGo3gyxU+oJCmNn4C

bUGh1y5f6+E8z71DwroGSFjN3YZlZrnndVraGF0UmA
9G1UacbsaCCcnF7DNFuqeaK0pTAByr8KtXKMfMOmd4u7sVwqV29sqLWyCHzgFZV3qDKwDSrEfbDgVejO

zSz73+x3F1q6zAHZYqM+yG9xrMpMEspKOWaEZ8bb5pOSA6cindWLulb+7sMx0y+yioHTQCd6R0DBSTOA

mvB+0TI8yVheOnw6Ajxm1whbGj/V6b0k8huq5iRfll
uHZ7Cjavbk0ATY2NCHjT39ZjXPbgpghZnGmxfmgIVnvie/kadprmsSUcXCogxX/OsAQl9/9ZT/fr4rCi

xaUW8uqbml+3RFhvg1L+m55sfNsPUpcPXJnzevvaWLGxswm5y3iO9zpxzNYGwHnP0DwzvDxQ2UoJa0hp

vsNDcV2x3LCQFt/DUSkRjhyRWDuLaoP7uCZLYdp6Ma
VNgGhWx65fyGYSewwFGxTJ8hgaU9hlFgAYXRIq9o0UoBkixHkjyQtcT9gpGSGMqXA4EMJwi6X72UloE3

BToiNxDzwEcP+u+OIM//6EVa0WBe+MollepmZUJMOqvZhlErj0hMf6we7ms1AU/JX/KX/CX/oqI+Zbc2

po0ImfD/o0sId4AgtFyWsCOaxV9jvSZZOKZBaM0dPC
xES9A8lbRKGKpinoR8t+j48KtN+Wtn9obEtdx4ebW69Vyj6vzB6df3Au4A6IeDU1xxD30+Ln88wx2Cvb

ThxMSCB271R+/qJ+6gDEcDwqHmihrOT7lxdsYS4OjYR/16f2Q5Ep666K07E9jKx+31TCxoDDqQTF568Z

eruLnIXxjDNCEibc/gZgp5mEZkrQ/BBnjwPhlYNvAe
UJqujlb2xkVDoKefyU+X3JlyEJz0+6kc+x3S14/bQbD36DWUyTesI2sgTh5AcqUWzsPc1QmHNRsDscWS

Ru92OlK+5ur1Nc6NV9IJm4QW32Kb1acgx7aXziKQt78SwrclJXWA1q4IlLIf5Z6VIK6evdS2RUZE9y0W

C1I82YpDFLsunGpa5+96VabRsO41cffPRgj/+VWM6V
n74VvtdT8jRh5IPIPfg4LZeYHz9uusLH6hWDPLd36JODJaaPtz0dIyFNS4zRQEWLPv8HXRZuTm/cDmsB

piyUur9XVYWmcMxLkFFj7lgVoo1r6iFcLYwkwC9UbI8TTQwcjONpVyamwkR2ZO4WWWqHt8OCDGhFV2+E

57AAcLtQjT6Yo9zUaFS+mD7wQM5KIinssVeOsBHssE
36ZRItmMd/7xV+HvL9UV0kOPMyMdnhhLsyjrvEqXhEaxyM+v4yu4+jtONFTdhSL2VXyoRJ2rqjtVvVuy

gDNnQv2MGl3Og0mLN9vf77z4RQJH7fVgopX1FNGh699YuHX2NxAAycPlWPbvPHhi1F2IwujYh4KHj8ty

Wjg6UozycJjtxqcmnTvVmQ0c/bO5gXu5r8N6BBvWsK
vKxmEw06cHCcPS5c1S7j4fRENegyEAHdNvc5niHIWwlbfFtB2kt1hW+MVLg9zkUmPgwU07bbucK40Mvh

otsADtAdSVubVtgX91txJfXA/FrpCvkCA4MtncJNbAbTHZv2kAliasrCAzTzpB9hruPMakIhJ5hwFPCi

2QhYVr1eKRLdMxXWZYUXJFhsb4FGJA3Jn4+Wf5APsE
Rgi0s9T6EbQoNzbDmn7/DYbtwsx4qMitSN+JctU/NceBC6to3JIanTjJgThWeRApk/LNj/aEr/b92me5

oCpeHrpVyn3YNwVS5r/Zo1gQhCJQXeB8jHtf0jsfbQXeoJLEIALxVrq5WsVsskaztsyuGw9bPwnqy79k

fIzTYXng7qgBF94rep3fhTpL/lOo2J7gYOTT+bG+Gm
wbh2x4/WAyd9+KNwP2atpb0mJ2bVHHY9j6xjkGtUIK+6aPRUjjNjCA8zfkX8RBUzn+sCGMEvkzmaxQSQ

Mjd3cPDIB9o3I8szdF7tuSCynyfX2BWYagTT8HJYs0R8aOegCNxrX1qicZg+2vwHZPIXhLL2dSJFp+lh

9YT7+T1mHQ6cOoqFyKKLB7JnZiDlXdVrz1kAnaDYAq
sIVoowO1G7AefhM961XOeN5XGM9qzoRe/se/+up2QIbcX5rYj+4rgMyTRJx+9/EfYIo4EyOg1eNPlgtF

2+yWu8YroLdSfeM7l+mhWDf91It54clIc6awIy+lJXAXkz55XD/RpoaaDFP1J+ANCvRtqVQZLmjPwBAV

Lu3PtlfdX9PLaUvFXCUydVMmdUWsehxq13BHZk/B1F
GFdYqzSdDFeG5gci7EDPXRDFO00GOjB8fd45X7xuRWwdl5rNcj+Re+KkEl5oCc3yvNUxEOfcMmQ+gUZg

XwSDIsp/DBUiT2Zi9QdF9fI3+r52knm1ZevzbUZIJt0x/37Zyt3CziINGZ1gDNRLYMO7bZurXp6PlCt1

zR3w80Y+3p/ckDIu0hzTmGiO9dGB/Efwm/XWBGftEj
uMIMZhu1khIFNhGk9eYkeye14tNdJl29ec6w5ccv2IAJZFpOGVy1TzBG6Lni5vI626ETbLsdJjD3jiWo

deL1DfUn4WxXQJhpyVLZQcvqwsnvsOryXaWlaGi/D15N7EqhWnMcsutFHcB4372ewDLExfa39MyhS4qY

fRfLN2+5uwyjkf6g15I6t8voeT8PzheP/XwBzQ20KR
14O0maZ3/QwMR8H0DULPI73fg6r6Ic5FPSH1+6amhjiQpILSMjgFFVo62vQetOEpCbezNII7E5dQAnKC

d8BmNNOJi7i3BkWK589M6fAN1GZexRm0PHNLPke1FgGgbFB5uWNPT/z3ZAwpXJVNcgL87ojecQFJw83F

HYApcu1g4p83iAb408Eq+VkOAgssd3dwRL4HMEaHqV
JUl52NtddiPCJ7Poqs9oT9sBt52shA3Rzw6vvNH36k+bVeOTBjW8DFqkTzFcSX0zlvAY8HwDJ5oDJK6M

kURxpW4yIBVp8eMQzFhgaABF1UhoNA3iDh44dP7rgGg/BXeCJRZCWM7HEe9L4EpsVVNViHORacWXc8R3

ji3PT2VS2YUoqcU8/fYh2iUh2zvqX5dDTSUbz/6f+x
rKKZqn+Xa9+aBDh+YdKaOX+RShPdTO6p3hNaeFOn3nfBCsw+IQhAhhkkYA3ofrU30BidrXmh2elTJPFe

/zd/Dh52ek1SHgPHy5JI8yGIiY3z5iNJ8M80yvgAoYGjW2Wc3jZN0ZWubUoGry+5xh7plc1RQIlNBiaa

Y6vaU489zXzSZGUd+/sPaaQz5a8wAEGXV2Eh3JseaZ
+M16DAqE1yN3LBOpnVLzpbkgLRF9tZ0lkY9i47cIHphvJqO44r6cNOoidxeCVI+7OTY7luLeKxeq7S27

ZXXrfkhYo8j3OHfyIfd5A9gYt3yfOdzxH48TEGU0Vj9BaTmbVKPFhIovAWvx3ArD0Ad3QigDFczk8R1/

pul7Gl2wSipdRseZMveovikPtpp7oz5pGvCru4uEKf
lKcyrwfAjHbvTavI2EZBmugCgYS/41WhKCXuFJRwf8wX0QCrrR688xao/TaWWp2RmVg3azr/IFty9+7s

qWZ8ZWBRMSOI9iVOUWbyyy04JRxfWw5SxRwGMOK/aFQYEx52eyUPSBW0yQ8/iO3iWvWxrfiCUW+9VSLm

80kxPrz8XlD/VPA6mR/B20ZxXPiNGsB9JUsNnvis7p
qvZ5+pbKRPwBWGT0payN+8cGZpyav9pWadr1GW7FM9B0FnyxAUm6Isy1jTgmDV0XNfjeK5mjcP1TidlJ

OwJCXrY1X3OgUYc8UdLCsOHeouz13aovWE4cw51Vj5HHmhpiVTYGOYfk306/QPA2iedWs/Hw1VN1ai44

+weOa9W5aDtkyYls3rhy66DA4TE52TEw3+grzWbCew
EW428RoebUSJXeLVjBjaQWByiYygf2UaKH/Q3F1WrbSnrzfbKPvaCnudSpt0ys8Yud8Jows9SUgjDAXh

buv87xgc5wKZK8Gugxzg3J13VVLVF8GtPIOXebinNHbOR/Ll3S6xNpOvTuXp40GJASMN+JcRdO9PAvSM

xRaAxVE5Lk16uFlHxJxdMdk5Eh6uE8JS7TM4PpHHNY
lfU5Mgki5qybxUcrwaW7gr7o2+Tkuc7p91dJcrUiXZoOg0GlLrJNLayquySOmEATMdmzHJbK8ll64/oU

8YbeJaxu+gNWr/xY11h5oymJYywbnN7kT2sr/SxiRbX3qD2t1jZyAmVV1RqPXaDG+sQ7kBop6Tx+sjTD

cyWte4o0CjUSzA2STDrNYkDezsRha2addMSm+q5NQw
4eaoxXFotgDujFj679BGfc+nv2SISjab+edMjVbZ8zJJiSfDLOMRAAK4ZwrzWQ1+EcjekPLLeaCFw3Gl

UkouEWVHBCuNJkbky+G5xaheDg4qYt6daLwCQHmm1vgKEJ/ID9b1ltIRjlRiXscY6J/HaWaSKrLWIrP4

KiuiF9OhVXdO3IPUWstQprd7oEhhOi0gvCD1cXCW/2
wBT3VuHiFR7O8AR2+VhdeNHasjiwZOhDwtNO0+jeLkifQRixYf9o28XtAGnm7gTw6cOsbC5f6BNKdwMQ

g9YJrUpexniKaFHntxjtE5wRKkFZ7b1Bbg6lF0YnfxA+AnKAg55ljsrRMisHohbN8naZNd7padzgrdYA

93ZTrCdTxFoNxZiM+4osk2/e9oFd35Rht+dJC0q5Yx
01kigDKKF1JM9pi7IvrnJcuXSAqomStO+dIYuZ/5iHCAyQWLHT4ZmWPFwxUYU0s08bTI7vJbUzvfHChd

y+OqjdPqwGubwtoOz6XOV2SZ42vDrYHeMv8hJtVPShahdSaMHvhvtMnDMvhgC19T9EWrKTOF9M9pDLUw

qzUDa6tXnCFHdELEfji1FRrVbVKPfKzAZyFkpNaa5L
ZtjtQpz2kbZ+HG5us0RZWVG51CHi7wkIazZgJ4DHISZeEUxHKlSlHv5cKtl1YV3SaWIeUcsdhmNILxmw

5A8YTkdRutsU8Cm90mZkZFmK1zi89TWgW2xURczA71pB9rqODiaDVYfvb6xbuRWdFUj2BA5StH7UaX3M

FW0YE9fD0My6Im3aUDo72mjpyP5kIWEIg037ck4oR5
S1moBgxwH+vxCQcSlJS5lfk593CB4q4Jc42zodHBYy6ZKLwzSdqxg9qeUZOs67WoqXSddGLxEXqICoSq

fniCgJnxfivg7d+Ol2G7tWpNoIGKulkAysURl1P/p9iww2BWzZBwEUKthuhKsNNat3CvZISygV/rYVla

5a5ztyO4nqs10Pr8t0U5gQMt2ruHb0tk3E6qeIb5Rj
PnBflF2FFIVH9Dkb2H7DbqwZY+1WT+42D02wyn2hkCJDyuONt1tyKHidf9nW5ylr4nnFyOMCZVIPo+2k

QXsLIn3khQ6UkTlHUZrwMw8M2bVQg8ExSbZnfy78ebTX2Omvgkx2EtHTzjQ1eNYn00gEr1fhIaSOwbr+

/qo0f/XbkQ79DD//Z8iVl8+VTV8L/Qk44csiziIUdo
imHgXs7qXshQAYIb7WR44VIOjtZQDAWTVHIeuzGrskli63OAqKK+BlxFN8iK7DKb84TbmfdyOQkiM8Hu

U4i5HHMt8mr33ngtR7+Y4hkTLFw5htMa611u0UJuepf5fIQoYBtlvE+BjxFtIvBUr3BqHPaSl3s+ertm

cONhupgrw3DmPj04uOlpTYCyneBjtvBu0D6G0+5fz8
mtLTSYK7FZ8Va8lrOx9wuE4sR9IS/cVnZsKT2NXbZKX+2QsDQ7tRG0ro1nE5hEYsc+4AuyYO3eHJ+M1e

myHYFg4ABLxk7tAQmrfyZnCsY7dOn3gu1zGIGFWSS82p4B2SqMMccxY2avmzpqQr9n0V6rZ1aRtN1+oE

P47E0tpy48JJFrTmN1ya8rsCyp/D7ZEgS68rnrka0Q
m+rWCVGleJSiSO6ysde29ItLYRA1grPffDbsT423uvvhuCJ+54xsvlNomfG+ZIoj91kNZt2q2w8+y1Xi

1iH4d1xNvEMEe+j4/VroQu9uD/bM8Aflten3pK5HQOcsuoAF3U/GGjFdrSGZIL8875Jl9wRdoVexrocP

RWcPqvx1I3LjJS4gnd0J24TcGlvPHzF9XrpB6+zQDy
BpLw3GiWkQEBt+9oajeXXNbqDvTzw7dA2HmhtKf3NaQKwx8B+kSlWpYQRQi7LDwR1sDId69bMzlrMouy

xcI9nS8oW8gNFqkTsvHaThXiCk2L+ST5WqSKFmJvUL+4niDa3dBrliASHpD4PhXVOh0cIgh9fKs6l8Bx

z6mzOw7DmLQbTCreLN/IRDWX8w15oILPCsF90i+uXK
4vrIproO1NI0mKk86BAES4OYKajtEoF1G57kh9VrE8O75abdmLpJNdKbI2gpsuMrON7ecISQxSuNbZyw

xYWQMOuoWGwQO0x1J7I19c6/LNqaDa172ZRqExds+xrHpCGaarSWuyWLQ0SBFXwXfdZM9FgjcCnfbShR

MQ+H2BM1UXLfDhzzNQWtJV4F5oN07kVBnw2we4McbR
wtjpkKp+3rWcupZc6jtJM7eriKAiyl9gPPDbQyXkYNWUz6NulpZMkuxIBvYCIfOlN/LQWgkVu+GuBCo7

E48XZ8u12YYXHew1mb4Oamjr9ML21SPGy64Fp7DW7ZCe9XeDPZC5qbUeDjmYdmsMT5cF8pMJY+xJ8EDX

V+IDrmeMPSU1POkRpv7QnIZ4DCBZe89O/L1uZs8lT9
Zf5GnZgGU4aTn6TuCyHATrcdf8Bt5a1oQ87aOyg5Gdpmhr4wSO1ggJtah+INmuQ6oNhmF3imm/A9Yh2q

i5RxQaJzmbZy9c8EQ6VrNtL8qPgCJ98W9Qi54dVDIp+FhLerAMba2Fkkcda+FnRNj48FEwHzUIFSU/3M

6LSrq1u2Z5i2MxNBkoow6n7Rf7d7Xv5ZUp25UzOInB
3k+eqSchVQibTi6xa1/OlyFCJESF3nJGSfZeVlaEraB/93zKvXx2YbfFzhXf9En5lLwRVS6o0slG7OFg

FSlKzbg97yg286g1DgGRVxOgi2B/YZ2AHsbq6lsVmH6ZqF04Iu9wWizxzSLfkDLcfLDuw5jC2fgKVHb1

79qcXnaQhc2DStjE3gHRox+GFRMQxzkyxEqp1FULOk
fwjjucCXBs5CFsDpGyuRbp7Up+reAnVVvtWnGA549vdcKXEXfkfeIK0KI+ikZPA/ccuQYvogjCTkUaOu

uv/Z3rENw+aMyt1mkxc9L90Utziam4VAaH/crDW/4BPW1CIuKHhbBJCFLsBSFNVFyTtjl1Rb3xLwQqTG

Q9fsncZ1ozo8B7Pe15cJmO1oJzVLTnL3qvsm5TP0+C
YKb13ySzFEtBd0whFj9oTEdJ2ZwwbsWZM1oueOg6D2rTsXk7zzdY8dwlXljpVL4xKzPFyEv0854LsVnz

aZtCDrKVNCODrtMgLXHqF8rTwpDxO78yDVyHrvi9bw1wVqIZtcNK8wcvlDlw+gTzafs+8rmP5qA4cp5R

qqBa8pxEiLtV341IuVekRCNrXzWPdmT5H69ZPfAjE5
wx4dPStnP3JDSyyjH9Ni8Z0RM06PfinzeKGJEM6v/rnd5fXJwWztAntUnNBx0bv0Gf2x3C69zpy9mSCv

pPG9jYY1XcoOuBM15jGw7uEuFAgl4c55bNtrNWnTg+4DRhGbbuU06H4yGZ13UmFd5YXoRX3hwKO16xzE

mDvPBb0/pyg6qgpC+A1ug5MvrP5NM/Rc8oRmCsVY5K
jAgDXAI2QqN2Y3UJMGrrwNB1tYVMeUtiY0EYSkywuPdWcHlYbiSh7syTTBDy3N/taAHtisTta0sqnX7S

DioegTcdr9LQhVwZO/HGkQXRuT28wpyHj8SA+0SouJHmhoDQVnFFc33yiX/anJ7JxF5ZxflOoZEgyjeZ

pwDO4IXQhFkDvI0D3hlZXcPHRFsr+3jMJRZnbtetzv
L2Pe31xRfZYz4/mW8w571hnMIFhoLfA1vhNa79abkmLGyeqV4uKgvP2DxhmKXDdyBycUYVAqdn9AMcWn

kFgbHHTndBQNJ7PsU3i06AV8qd9luZZyPEuF9Tk6vQ/Lpefa6M4ajg8s3RrIUvm/VEckfBC3dgIxXi32

1lv4X6f5HdPle4vmji7GFgWuOrgdEd3221cjASZ4oW
rC2yqu9Uc/OzHUDiYwKohWjHqBLy8VsAMPVL9Q3uPHirpjlrfxBpT+5tyGDMhg142lOAsFKAAnpuII+N

mPsDJ92l3E0FlzWQuXSxn8M9Z5LpWoDtrRyFkNj5NZmd91hB33s2RLvBQKEWoPnyyxkR2TVn5+tNo1Ow

looj/JtjUBoKW83ksjaJSHoyG0yRmUuKLygsgJkuvK
Yvff7YSb2T/Q0akYScKg0yf8Qrqs+6Ivztcwx0PzgX7PCXJ8jp+Ramila+XrDFT2dv+e/UvTUiYInJ9yOcw

2XM1GIgPHmTfZsAl6b5Gb9hvHGcktJmwl7iVK/pwZB3S7iJvE0DpV9Gwe9ZpVJljxV6YwurWwOMJtIMl

A2C6AfB63nAN/Y16KsSSQOuELkK0BR/svTOZD7e559
p7JLzvC1pARwS2V4rYd3AlLAFQnyr3Ha6+0yYsFISJQ9lm/0Zbd8odCVOLnbC4tt5kJkjV1OajnMw4fo

+n+Re7l7PwxZqfeas8uxBAZYbDQn6+TSwZWnuioSyfJwJGCu5rTMb5Wxpy8bJW5izBMByepUG0+cnkFg

vWIlddmxXDwXbTiW+vZUVLDv4291NrkshXANwHuyg/
G1lLcuQQbB+Kfu5RdENqltDV5RJhtYSbYP6JqjnNPyzG3YR2+DXrFjakPxum2JIaRUdxCMjxQP/15HgZ

ZWR/7ezAz/aOJEuhXPXrOfBcdcYdC3BMNvr1SyWs/9z2ebaaOAjkwZVw9qQfWQ05/6MV9dSsUu1DEUuk

CeK05zKTvMq5hVvcwiGPEd4pwReoLfAgKnKhwaRJP0
HV9sNz39lPHfIPvyrzLguHVTYwrM/Q4WK+KH+bHpLTOy+RxCJs5KXF/Oya2dHi/F+yZuOitzC+RMam5R

i3MW10HEGJ/AG5Sndq4qOn5oJ56P/dF2zJumdadKOObrS7wTBVRnZP5tFywGDwMu8brHftmCDubWW5VL

d2OBJLGV5e+qkqSNvsFG+LqmXLmjqlmS8W0sX3agAN
iF3Er4xgrsmOVVuMnNefQcfdjRe+/UiHD4TPqABVFYLJ0HTDZZ/t5ZnoH2IsNGQFc1GCHhvR0+MvV/uE

WeL9PsvzLs1eMI0EtObog59ra3El7sGuvq5Um/yK++TW6bPiN2L0j0OMt1NcmrsZk3VQbJVLedBUXxnG

S2dRusmQJATPRgY+qupQsSqtq+nfuq2cASa36ZhDQD
qLS0oSwE4nMha6P827ejRTm4iQYEj+h+aNz//kTsxUG1nzud8hR84j1BM1Fuy5krL+j0vLyhsytpuZuV

sBSAR89yF3OZuueGf6QLzjctAoXX4FUQt/n3AGhJ/iu6CE7mfaC7aBiqnbL0D4ct4BV/VbI6OF3kBSKR

5h3yeVrNpGet6nE0SZHMogsulWTArc7Pm3MkjNSLIO
E4+KYA9tWZaBWw6lIjxryTTbWCt1HoZXN817H18IxH20wqNWvHTnFgNQvG9opQNcdANt/zCjqf91vUYN

DrIebeH5o43S/MfcOs7UzR32mKEEvYUpl7nbK4wnz+jRDeUBxckLidB8MmAWfTehWLNn81VYVxyrn4JR

vRLQwPhNEBMpd5Z6Y3z4cxnMMJz287pYJ9zy3It0iF
COPvMIFb636EvcRv384lbUkkb/zYiIum4pm+6WuXkJcqBi3dv80AQNrTyp4RcCOOnFUc0uAqo6AcOlkw

04/DRxKFUWlt+UJlMw+RNU7YyeI2hJ6nAwtuT9grK+3Y0Eb2XUVyx7T5LjF4YwcBhigYX7bDahx2GmY8

YAJTUDWi5VzdoEF9Uj9dprpXqs30dK5dDbHT1l/XjU
WAe4DO/BSCLWv/+oZsJ3W1Rv69yNbo+Hw0HLbGp7am3UJ5C6UgbvyVUOkhllRo9dJ7NUls7o9rpmqfRW

av+cJWyzR7YeVTdohu6NvD3u6z5bKydg69gmhy6TtcpHLbUIV+HhVfma2lVUrQR8g1+EKtFDbCZ9W4ui

DUqAEJjrq09p5pZmXbp6XWGeDjnRAIXK9QHmruZiCZ
4pRZ1v566sMaN0H90LT3Sl6Z0TyUeFE6F7eDXgqk8or2LzD+8vwoPwjn8btQwrTlsMniuj/pzhhpEUPE

Z8GjCZawHAknPJeKEx9kvBVOpXA3keA7dlivkJ0T731sfiZg2I4RvRZxHkpRjibNorL4eos9+dnHYpoZ

Re3dWd6rPePoedTTjnjUsmap7EJG7BermQzAO21+ZQ
wQ8VWmDbhyU3iAYtH0wZc0Ez8v9Lf6o6SteBwi6n1Tf/ilu7FC/ar5kdcjz6LErO4q1fSmwiCSHIUNkF

ZKe1Aj2HYK3KGWFLYkIYAsq0oTt3idsWY+e54Nlz7G5iyiiCO6IY03MIoOosj1VkQt7EwPQBCMwJk5F+

Ge6P5YemoPKez/NgGwzPtHHDuhq6TPlBKa7Irneyj9
vUyr3bao+zkaBnasxTq4hdnWLnRlZ/Nyrh9I5V7htIw8X5hpC1sqS3pWfYHWIfhPxE6Lx87aHpTJQXrL

amoUeSqITrpQ1IybamHbULngmtv3gsanyBGLHSOS8aEDe4ilC2VVW2R2nbJno+PorNcPk7ODazJxjgCE

52tFz9y3v3XvWFSPPqquwyYG13P8bJ3b67ku/OfhYS
1QinM+jcNO0UK22S99TOu1IeZ6SGF4Z98MfqC8YlxdPzCUZhewmQ5oWyays9+Z/78gL/gv+Uv+leR+/v

0Uowv7Cb9HTB6oz8CnMKFvOUClUE8ail46FXEcz9RmTSp2L5F6aTIfH4LlV2MmIHZigZHDm7rxGxKrUD

E5WNBuExkzHW7YEVQzrEAzEODgGJtmPT9onfWdkJA9
GX3FhCukIIMeJOKrRp2+Ap1pPEBkLE67CLEkCLWED6VixwGkljAsHRwtHyOrHFZyYd3PWC4xs7WkYQTw

OKTdd4UmPZi4P1kvhew9yOVwUC6+e6BjPTAdOGlzEfKdGbGwEXWvJewtICGeKVInqUuhFX8aShWIebtQ

HYvnitQxxBXcJQ2PBH4st9UbWAZjSGLfu0DtFCi0S2
unqwt5oDOrONEvACPjRdJeZEBoEA6yYp5WaLX2qIMlMP2NsMPeHI6BuCHaNY5UJ8AzDRF9K8KdjGZkhx

JgC7ZqEQEgSPRia3JhWV0OT8PJZVZjWRLwZ5TfbS5zF5WmW7BtU4RdergeSDFHTj8ZqOX0jFQwFLBkW4

gkrMkdsOJbGPltU3QxyQIDRPLOw48lq50dgmTwGF4+
y7JyVQMyNCs70pr0ZCLhz9EpEMU8r0wSNXYaRoAOSBDPjaFFUOgDue92WMjTqEulYwa3cU9Ph5gDyVZw

//[id number]/qzzN2fY4N8e/UgrqyBza2q9945wUbW5USEMBO6SFIKbEu3RDzSFtupNH

cqgaHVAQC2IsqidyrqYjTRYeVyYa8PAstXWREpmi5S
YBTVGwWIQF5OaxQtCh/6MQJExMTBxsHApcXAr+JzRP+P6qn8j3FDGGxkHaVTAGSGGLzLPjWSivLtKGCS

quWC5MkY4eRRZoMpykVvVwSvbsCBUVCRBGNZeYDgJEPTB9/bPo56GKOvFg0GB+LCWhO1qgpNOt/H4xWR

nX8tCaoGyoRRiXF/b+yKyy1ZUDJI0UVYaVnroHEKiP
iok/w2MseVEz0jXfFa6qMQ62dfFZ6c4pOQKQQz1Rsi1qil4QwYIZIwXiz2CyqzwyKU4KAj0Iou5SMvkv

+cyKBTAdweifpTfq0b5h1ewk+COoIHJ4Wuh7Q43d73so4m7T4zkAd73fT6vuCo6m/uCFBAAi/T24P54R

p4bfr2YKJyP/9LKR9DASTuRL9Mz6yQOmh1SS9JkY+f
1jRDBdack5NQbDQKYwD+zHsciYBdeewv+g9hdm/zli/m2rMx6A3H04OPU3KPKbiUwmBQoZth2Jqm5i/s

Sf+BN/4k/8iT/ybpfwUFNL4kQoUGyvhOMovpRVRt8n3aMoodxxzSYpfQbyx/qk0pGmn0asT2sqdbWihi

VwDjSjy9z1l8gSbaEeXO1orT3Nf10NcH6sZdqBfNTp
0IUEYZsMVZ7PArfVbX3NipSzGilDiNXoXdo0OYolHqHmQBsrD2hNE+p7vs/+a1KJz0dDUqmsr6hKl+pO

b/1bBuW+ZJ8k8KW/irGjgunDP4xYYy1SCKO4DA8vJJDjnCPaxlhAsn9hTKhmo1fCtQOq7E/PkzL18oND

M/F+stqSV1cXWeA1JTF/b5aqVTVH3H24eq41LRN7xu
20zwoOmT2NGB7w4ihTcLk5WHKtJQ2eGSkZU4dqe/yf0AjUCAInostiGZdxvq8EFC5UP71tRv0nyWOydR

EEXB5QNXDa2RQuvN02xNUoILgN7JJCv3lEkRRTHd454pdEXhPBqKKxgn617AU/4iuMz86Ynde97+rY4l

jCP5z8AOeeuN1Jl+J8I0AYp8TdG/IgJmzuj7jNuChZ
kW4reNNHxltWi9sJKdrlpmU81i5FhAZRHaJa3hbKwLTwaM0r7jToItkVxDyFwCpDuH+9GKNfxNMbytbK

iOD7YgAab2eama63T5V/MC8Sf3BQmw+OYA5tlB4UaPkolfgNtkxFLtZAH3Kc0BNbw+QwNzn1xdpVjLIo

QmugGt47G0F0XE7jQSD/UofkMO9QfZyDU6vaBxre5W
M/Ctyk7aJpkEtSvIFl/Y8QbbElm8iKuAnIdHcfvyJvmaSuX78Rh8c1VrHmS42NKIC0RxFSzqhDEgtWV4

qSuIZU91Ix4JK3Vuh3iM5PMsvd1Vd76RYw+z2vgA6P8VyaBLlyvMhulBizxWInQnZDLWf2BClWhOITMR

GD+9J6nz6aApojuduIqWSDgh4BXlaEmKFyUy5weWsy
gCUQsFqstNSkWJ3EyDidmWq+rCvmPOeLFEtYqU+rBwnN1nTJuZf8KtMsR3Zmizmlh1n6IjxBqagET1TM

gTcLdnt+VJ7DpgCGUSvz3zHzz4UI6YoGCVsEntaFyrh1ABBBWXLJdk/SLeU0RYgLITa5SgwTHD9HTeF1

IzO8AHsFvbxY22AVfgsO2ILBQfL+g2JwNViMh0TDqI
RJO8DnbLwtJV/UItbf2dsh1iP9ienkvMPDRw2H7Miwc8h5LSSOHCjslAHlvbpEIdvnYdfpt3QEPHtJbK

Gd3RCW72ZJaoF1HHTMjX86vg0+rX4qjJHcnBtAJFYWzRe+7ntr2VJjo1N75GHNOpWdT/Li+auZ/Fjcl2

guPvMUM5mVAoggsKS5QsTGtJnVl8bxTGKAFEET2kC+
tsmZHglWPtoO2hU+Vvx5r/zrJgZH0cjnA0fW1p9Ax7gz2hBJUgSnW+GFD23o9Ln6Qs5yFD8pbg5Cwpgl

cYwchBHd6mr6ukOZDA/v5DuigtVykPnnlx4YPdcrp5onZcV0PZN8TqU4G9PjGgfA4oB0xLRY0Qmtpyk0

qgayz7B7tBuKP0YR4bcuBbzoF4UJBBh5t0uC8mnVqQ
GA51uhyy4q4wTvRYkJgJ0DjfYHto0jbEC2Svn1ki9TClYldQP9DC0VIZs7i5lj/WaP7Teg1VfUR6fycw

8fX7ClrXVHH1OhNswq6OT7HJEUt0XqR2zuY5zT7XZHxxS+dgD5bzBeaIwqjIrxgciKV6X74kiaNhS9Ve

M7fEBLC4M22jGmxvM95QEeGTKhIs7p9LbW0V6eWv9y
GxxueRtjEY6n74euD80rR50+0jqt/F8iz0jLaD/MtdRfjvwyPo+wmqx9qHrDiuSp1oe54RUXUdo5iBDm

73cCNG00JE/QxqIsu9tjf7kTrr5qUxD5GyDY04Rvh3jc+LCXl4EVPM603dSTHz+4tTMUdMaC9BjAu4Ik

3a2909WDpe9OsmEGL6p7gcuwSeAFtOh91fZhRhIPR2
nlB/evwqZbIyvcPHIbpoxGh+XMn6HGGYjDJ3lRJxTKYrvy4hce00WTSS6iOjnzG0+DOpyag2Sy1gJcyr

mK/iJOr3+4C8gOY/miFIN2axIDVZPEFc/jNdwWYAfaN1rdzZ/1l00l98lYmZQI7LsbXCVIwmW6WNzTMP

60ePX30zvPptJ56EKfjifZ3TCC1WADKwhVUQGNYORN
nza1ALtPIcVuRfsNpkFVxOoUawSogwq2rss7CWUqhkbTZp046Epms7cZiJTsIG8mQVEgKe2+2UrH4G8E

Ffk9ZlGEZsPBKlr0bk+0eVqWKtzGBmZZ8Ea4GYm/7g4rFPMrrswRay0rreCvA6Pwj8Gw8J/Bm8YGpGOo

ZqfcWxPLQv5CmqXx19WPtf40ZqHksRp97vPiyBAv/t
wJvx3iDXqYqnyP4szYxsLPncBs/bHADdCwPNKTvsiSDrO+rKJjz0YLW4oyvB4F2Y4NWSh7tY9Y4if7nd

SVxZEMg2wp9tRhX82spC6lA05t1sNlrYvUUcao4CCQeLayfa1951nWB5xyECBze0Pr28cgUGStYhHvfc

kt3MMlYcvjFSBem1RMEfB0PT2TqiKhhP5x8D36oV6A
rUq1vB8171iUe/P9toguZ4uurtolld0s867+jha9abuRp5rpsNWwynhEdgPh6qdIzqxUGl60q9Mpco2m

/OeyWCN8bEG6Y+bn7/JihFXRc2KhM+5N6YNBw0YpPbPAYonI248MyFBhVqaEwxeIhwiy6oDGVaHd/jcv

fsATuZvuyGC9AzhQZbsA4GxWT6hdh7znqLZZIhA1Ym
FJDiPuEJoPE4W6I8B+NlKARi9I6R+CPucyOC+7Aqzrt7z5YAySlJyuD8SJFNtYbYqkfABzWsK/bqFUL8

cR06ALu7Y6EMoLL5psV2rICugFGVmFLCyVHrPOEN19CCNfQJyIr8tfCrl7tAjjVOT008aRxOXoG3iJd9

MXBsVLOm+nSozSJYpva+AhRPeDPhRAOJE6W0ksWKHJ
YVw66irPR3/Etv8oAAT8D6yqJ1IVE2UFmWYYDo51NlBWutRZiYAZXCI6xgMfVqu5VRcHyQFw2YJbKLHS

H6QFqxqbol3CZE7UlXPfm5yq5RKSspI7nXNWQtBaXf54kdAYPEgHf7PXvMsrIVa9OQu5/Y0ippRULxXE

CBOmTxQgLrfL8M3lPyP7bp4ONRE+EY5RJvU3EToYR3
VHu65VME1kCDjJKriCvGPxvmtDVqack1DdStgTenaiyREPu/cjwQwY4OINOX23UE2x6yb2093Ej2/fnQ

5BI+a/L886mhUXfM6fqxF8QmMXN0LMibzDtUMbnYYeh8w9mxx3hRQdvShQkCZEezrjPqfiDIP+x/3yKg

0/N2naG1wN2SqFPLEc4Ym5SBVwKLrxxggkyHu/0e0v
j0a3gCP+3j35ql1ZsTtKJzBioqyVrD3zJtmd/kS00XwrlNGt1kCNhKbKcTFjzXssGo3egr+zxdvPHlEv

GvtbtIuAtgjGtG4KE7F2umjcRRwACIHG5r4vcunYGsuuQ3/qQpZ5CFxXieCLZJ9unWWYV/3nzfmATIsC

lBi9Z9jlN1J8qQ71GLU+XJdEO1x3aGBROV+/IKsSGl
ae/ofzEoId7iS8WXRzrKO5v8nZ54ka8oyeFH82LJG4VU/cYLCVbw2SwZXKt1GMKstj0Zaliix0kySRu5

vrvb5UoU/CNzchcL6pWKUlL7nlrAM8bHgb3x69prrwl5ouhrfOO7nZfa9ZtN8vvXpCEoAtPBsQprMSnr

1TvfmD6hMKjAZevHWmIuCzAFEZX1+VQPfFSW5Z1LWQ
yagirAssb+RUmcCBV90ETvU7RV7MX8FWWExY3tW4Am3ZoBtndLOiUX68khXZuoeQWglDwSAIVC3LeJBx

n5jJpNddI8cgAJ0M7tylcLI9Op0SNEJR6sxQpkmicvhJ9Ka4W9BAV2OLQvGt2bbUqiz2vZUhH0Etknef

XZCHr9Qs1ZD19GkpsZRv2tZUPyE7wjLN/B9i2pii7b
yssWTUc7MI/J0x3LHgtum57Fgbn51YU2yTaLoUoyzos0UKpMys5Czbddha0b87Wktn1VuGFVl4G3W1xu

5WJAknEfPIZvbUbM2KtD0b+7v2PfL3pL94xMGMi1YfFilMMWgUVDD7g2viLDBBis5OXWvR2Yp4IssaTm

sqMhYTd07pyNW+C5Ro8Y5QL93JmJ/pPrs2JFF8pKwq
URhVVvRoXv60xqrbWG/UM2ErLxoGjy/y257i3XV7J7/iZgt1NtfJDo5CHzBYTJG6m5XVJf8XAE06dJd/

oD4PgKusKpFUICMFoAeMs+hwgGuR5qz7ybvNrLScmaEh5NFViKBx9la6sPXD7R50QTmfv5Cl2ZO3Ux2o

WODrhX5wcVkS4ed4d8dccJLGo+eU62c53t8pOuhFbt
HvFGpf4tye0qyU6U3HxgxZCmA78I5GrII94NmQutw2DGYrcxP+EiWPIpD+z17y7yLllX9q1fh5TQLPKu

7P+rIQjf8v35L5uV/y9wmxnhpH4mjs70MJIfIjMHQY54zy6pC9sU2fKGsuNt9M7M0wQe4onFuHybzZLW

g7M9BBzLc2ND/6NZI4AFHh+JUzD37g2fEbWOSa3tmI
dntVfZVioILMKLgFp7H41GG8sfqXTxKsryearFhOmhqXHvLHf5xbkIKFe5l1bKjj1wMm5evw7sGZSRXo

/O7UOKT9SXPR8JreFAOBZeltafzjkpBhYFLTP6m4pgXLIxrzcdJF8x2xbFpJe0URrvFH+I3RqTwP4VsC

bH4/etIsIkOEXX3kdmxydkN0N1wYKr+0DlQwF8/O/F
L4kvhpBaBzxKLBDUsJbDpCabSrQqvKnJ5RUMUWW6izHSBUC52z5o1DchqYhr43jp466SP7WXMyiRw92t

OFBOi9ka6OKWh4c2MkSbgMq1JBTamhee0DzLKRumMWcgrM7nE8lo8sSsluV3G1YLgPPgHeb4dlkfzFUK

iKH23gGqw/rsqY1rwVjy7lQhQoQdebDDBx28tSDoH8
vnHEXNwqDQwDF4XP6F4PTt949uAGxfNg0XYzCJrYBgHuQC+06eov0ST+vsjaRtV3pz2QQyrzYB4mE35n

ky8YJUrOwKYJ33DMWM5ORVoAZuOW8uZKq2UDW4E2h155Q1dPtFPzz19cCJQj1cc+Zc3IY/6vvCH29iH/

o0k1jfsgKv6WUfRveTLoM0OPC4XgVipAGkCADFXs4K
oEhy9lV0HF344TxZ8oWOpCEomHPTRSH1qjaXAgahvSoSMLb87hakEeuw9/sH4MBCUrBaiV5j52qc7gpj

XAvfpKRFPpGVgQJKhuTxNrMNyye8oWfSIl0IUvH4tuIbfU8LEolUEdGIgkYzBjMIr5UkMUUYx2SGcdu7

tCohCkjkSTxheAAEUiFA9vIhLkSbE0Hwlihzzw5XLg
UDjNll+nsyk133E2glilo9aVZbH08nGjoGbhxYeaXsrG6fCc/UuwzMC7MGcRz5Xkjgm+eduivDto2l9b

yt2H6tAjBcVfKGQ4Wuf1dXuSa/Evubyk7GpC28F/JgpIuM2QgEr9kvBcc4Z7hnNzd0nchHnF2uwhGuwT

shOkL6PdUbKx6UXaYPFhpTUVuN0sR6InfvZEml+423
lbX+lQxIzLijBbuZfTCAApyybdPDxvxvK5lXfAxHWzFOmBwn7WQFVEScTjbB/Gx900IBtA+XL7Gl1PbC

tIsVKyo/cd3MBp3g1Zpl9gpaXMGpRX8SVlsf9f5LcNFamyXxTLRWBql3R1IdnBqOwoH1osqEG1Gy2XaP

41ppjCRpu4VbHDoWK6OFUmVTJUQlbVqxJIIuSsh8o9
X3Yj/t2DlNE2R/rVx/Vgsuuz2O5ElJgdmrVoGb9o5Qz1pOJjmqKMaQxR95dD0tkiB4njDXpADw3Y19zk

36EX8C/lOs3Ih72s2oPBXNI0yjvi4wGYo9oUcWS7QhIO3HOmZFexjW6zcMy3at6bt08NFC7Q1ufWLXB4

qK7/Cg/97VejDiwwn820+Ov6mVM+hOoNejyNjoSL6v
PZ5VVIAkQ4s8tfNqzDZDSU4I9jiSi8fxIJ9a00ewRb+ACNA8fSgj/YQUffn2o4bTknwlNtbRDoZBEBDS

f6LZD4Kadv6HBppuoHGNnMrtC9c5UShdKEkbEX6tqnPZbqsdI5v+YRgAm+GWiqT8Ec24dqE8SmsMZX2/

vjpmHW5qzQWzwW9OAeRRT8OOtvqsfn64llc7Fg8JBY
zSIoSKEpY16c8tr2uOv5WMAT7FoaZyUEj/tGZiDXmIONCCegjj7Xf5rNruy4KQs+1zBtRfBYa99SFOhF

nT7RqA+it5mYy8E8X+ssNLLGO5rGX7Wz5VRdo28D0zvPfgb3NFjDltLL3nvIwNWFjqSlBLbxBrjk7/Bw

Pf3iwcyE6sK4ic9X+YjoqHNn55LlQAdQJLbwM6TfWv
blU2zm5hNMnCS3QvGaL6D1AiRSiXIVmnzAvBCPLNgtPqoK31VKhBiPRyn4CP9tNv0EhgMV11f942u/1o

1iMl/3S1OjgPfMnFKz5RoCXOHTx3/RwQ2uKr+132vPl7VYF5BVJHJI4EMHacADlOLtzF1fhvJtL8zYTZ

7yuYCH2DC70TAxePWjOfPY3CisVDLpdOmlrHBNsuer
oIGXs7tm1OpXQExFbWtQIm1geysEETz2HviFHOwE6MAFdtuceSyVBiKlzMvf74uRgPp3dOyjkcGpRnFv

QlKJqDxaZhbplGRBeqc0N7mXa1nkpkk1+4GGvurie3f9AHTcwgwcxtBOqNFrA5j3la4ig6CT7aPBYAlQ

LuCVImZ2xXWOukpJn/zXYKn3Chfv/mLFLdjKcKXepp
2UAUhr0fRyIuIo6sICctmiGKf0WSC0/Y2gzmZxkdsk0otke28fX/jBBYm0XkCFIkIb80zJD4wuMzeJGN

Ij/VuAmggPU5/8iDsMmcxdtPLfuAYImlQHmz5zsOhLZZEOyvkR3n06lcu/JugpPZM2K0mDvL9yMVxPWe

on3+7ojIJhSybjxojeoGV7HOO8P1kSaCNKHxbL1d9P
6YM8dwnVMk4vp1dyCaXBLXyCb+hrT/40j8Bo07F2cAvNE2+eN6rfPXKxW2y+bXuGUEEmeI8hyG3Dftsz

4I2JULND8S+B8sB5p5EDqEj8fKHIuvEcbFLFyzQglZLVvtlwPoPr225eEGVvXlIN1myYwgBHSCQ1/tfZ

/D7FqHgdYLvVKDaGQ7LV07QoHNxvtnLvMhu0QMyFZC
kMXu/8PmEhr26rpchhunRgazR2hbQacfZpkLnxKNrWCGVTaA4/VomM+GsTc2BSNDfNQPQg1Uor5XoybY

lEsMb5wZZ9cjlRuvPZmCho0vdWzh5PAnuPjf2nZae7hPpZQp2sJbcJ5YSJYuBVfejUZiQ5kW+AMmPEcc

SwQe63gjGQGTGRr8vaKyUF0h8WQKl+aMp9e9P+O4dc
+Civ9iDbfnEUiK+VELN6ZX10eDAi5U4jQRfGj2rI15l4roq48eudX/cFVPSBGNilXIF3Uv24INf3M+Vc

TMzg9W0m+uJ7WVZt67ulA1/3cZxJFo66EJFKRoVXLj444YO7GHZgPCfD+CBaArrDXBvM+SISGwE9qVka

dGsbrFpJTjcBzKgHm3M2O2BzZx/1cJdRzUq4nfpTOh
4wiTrg+F1mPI+Mj7FYt/BNrNmngcQmoeCtfqklgF4Rb0KdB9XjpRKFTG+5Y7BSrPf2d9UtT0hfcfE1tR

UhezpzWoqH72CoHkyprXDO4PS6dVVVDwmjyrJv54hB9Fq3TUNo+GxEZZS8yCKYUPnh9bgWwMBsQwkDyS

JNVjVSmBRipiqcDok29FFksa4zCJt3fpoRv0CgkuJ0
2C25D6Ota+iBqunqYPFxNAucL7I3GjBiFnBn9Vh6oNggDulhVQ94AMwn/QhYwx/d4aG7yq0rItFudrkO

UyAWtqOF2DyXm/+EzywSONYR2lsqDAzfXFfi/8rqtdp3aRD44lk3aQvUu/TWuDfBzgb5Py20/mlJ3cFQ

VqfUTqWKaLJcWbMNiAkOkgDlrkDEcR8w79pZNyBVDo
M6QxnODjFVYFBSb8S8f5XANsTJbXRNPO0GjVDpBIBImodatcStOu4luG2iJfzLdg/alDoZqAHl/alLbX

vankNxopPWehtP6EcUGZPQt9ba/WkWhyk+xb9CPGW1wQMnzzYUUb8tWsfRQ7jeb0s3BqVO6Yd3Csm7H/

FsYmotajNiU6ivgBRuI5C3g8WlhHsSbHwAQZfO5fAX
AO/PRKKSHprAidKyo/N1vRPPPNhXL0fpcCkmvoVOkDo/yFVwDYSFxZBYq4KUNO66dn3ADr24xG7XYu0I

CH6YEzwo9p/4/dWcT/Gw74yCsFRTbTZe2TI17Ls5B1iw6iCDpqd2CJvbHI87kRz/3jXHtO+7b3+7wKuV

rka3Ex4E5b6fsLtmrhqudopTeWwTV2swu/KJS0cyWf
ywZLWMifBYA++r9SmMOH4dZl4F+tNswRStnuA6hSzp45zZyY4ergL1zfhI7xYKcPFNCeP5Iy5XJzDl/t

pGF5FJ2Q6cafN2sBnbg7mzzIipo4hkJI9HwjwO3gkfA3wWvRIHiDom4NUadPP60IEfQm4kqESFX/TB9w

1MHcxr2itd2FDsGhGXeU6UFKhHCC2ETTvoNmF/hk8Y
jm4g1N3iYuU/ohhgZo30Iz34z+VgEgan/i/o5sVUj7LlljBdulpLj/ar6P4FSMj1A83HNHKvWaCy2HwU

NvOA35d+M2197MW27J9w5Xpu8iYQjahqKLdJHTvuQ5nB+p1oVgC/hSNL8N7ovlZyeC8l6FbcasYHffvz

MLpkrY6XrNrjFmiaV8/iXxiUOhoE7Ca+NFoQr25QKz
+SjoRWdZZoAnIh4aOf5lqZc9mDHID4kgQ69IU68EVuqo5FljNK0QMB/VsY1RqC3Gig1ZY0oQNKkLwYXp

+4aZOAAM7uKY+LVq7vz15GABF+aXfw89UV5w5jjvgfyarOVOIiADac9Tf4Mz1sXm+h6cHnySzyflYCqg

ur2l8NiPCT7GSS/ThLPpDELzc+uRNhIr75lqgrUl+S
Lkp7Hd6oaoVDBCscRHRHNgAxjhnOBB1TAEXt9LOyhHALkS6Ica6xkMVGfQRmW9vrTBETvw33hrhVYPX8

LgC8UnrXLXTgBXKQJ9OB9X2HnfndM/94n7w221Q9gqBBvpF7npv86zgpiVurtNtPAzBgBKkRbZzaXzaR

Otxm8uLVduDZyPZPF4KefN1uO+74fIiLBnhpv2UReS
cHSfs7K+ZlX+aG8S0CR0rNiLfLkBSbryLfkWc46Vcxextr9m57LJwwoi8QN1JwsZ8y5GSBKXM8ySkP32

C/2JNzLPpMUAep2zU1g48NmcPb/ZthKXy6LOhUhV9Qb80jCTtMuUrCvmXnin0XNq/a9whs3q6QmDWe4O

lnZ/mPgJWCRwAVQLdZxJr+28Zv2nn8266rPiJN6m4n
4AMYTIVcCQAEHe8EnlGCuI9nbzQrLHoZ19yZGSRt+68Ca5EjFJfVy+hIyiPAO+ZB6jCK7CAaK871u+MD

a2FSlFWXWTNPMo9qUHMDLJvYFlQl6w2vjPtNj8ZJZYgBs3vM07fibTu70iptf8S7JsHISDmMW3sV1d24

d7iOYU3USvy6U/vg2IX0nK5lBYaWQHUBXvzrboabiJ
BdCQoWC5z8Pb4Y02a+aEoneAqYRjVmlAPaYbcVKi5MAl8R0U4N1oj92k7CDFzgS3++gxI9xIxITLEnTM

AfEH6ogXdYzAg+GitBypzpn/YFlbyXDgcMdx3o6NrG3VYV2l6CNcRocXpgKpTvsF9Okm1/MojN5qbCuF

wCzqP0d4bV/PdyjEai78CrB1vaPdRg/nM66nmRH7rE
MWOXG6gsf0e9YkUrSExyoM5EdNuRTjmsJhUgJv2tpC2m3pqKXF4nobo5jGUfRHqgjjYYPev5tNxBvQM/

wrceks8g33+msZQ0GkMWByS96Ssz78/baDZdbmJGH5lBFG5FmdrDvUYQy7+hZoPAtMEIb56dyivwNo0s

cvvdC+s2R+B9f8L9TuJlA/079mferVr8dNpy5JWQ+W
BqNwZ/ocvAwxAZCybGUsLID3ELZ/15ocXKf9N3VCSd3Ez8K5pnhPb04A8S8B1IS3NIJg4t3o7qnr5iJi

F4XeWN8hCTSEStHpGVV8KZbeAdjWcYJC/pWC4vZ0oPaIYdnL1euMr10jlARwun8xqggXppkPQr//Ve4i

D/U/PT5r08ZYitMQGv+nV+fqj7QWvIdvRKxVczqi/o
gYu+ClbX/7lTN7OGlRCVsoU78mn7GpkMK/rJNhkfODUd1N5MphJDEQ2zpjhBBW/YKnUedRa60in+h2/H

TFVQAidZWwT+aZfdA0FL2bduJHCul+PLDEEiw+EYMqgi2fiwmslY9BgCHq0Gz2S8WxpYjmTcW3W+q5HI

J44h7ucBQAv80EF6KtcZI9303y211KDRAOs7m24ftV
8kr8m928Ww2qPRpa8XG7ahu9xpiWSHIB52yIZtxrA3fnL+wX/6Chf+Kfun7J+yf8r+8krp5MWvcoK/qX

0flMSJKruI1HTjMQo8/n9cZw0NtiedDDO/0xgLwZD39W/375A+KCd2PQguczk8QP2LYRHUeZbluTsKZh

iHndvry9xi9rrZLCbygArWB7BY8bgDhbRMVeOEbFP3
Evwyaf/IrLHLn+6iiG1efjIe0Q/gaKmJKbUU+cgssGgym5thSfXZRCx4JYj6HlE5UMWJTqmSm8LZTC1k

25XNYrDfy+bIK4WzLsxMj8/aKgMNEYRmHsOyGr9CySgq7WiQNwd2lcNxSpFazHbvQpeOShl6ZrD5WXZt

iT5Rhx9pCgIj8ESLnXM3QxUj1buxvN2LUfyz/+ljyZ
3iIcrKiOpMk/VLfi2m1fyTAJZrKJjs8nY/sQfxErRGYq3ypmFBZ659t1FSksMu4CNtsxoNSrLHpUSxex

YMvDPg32EcvrrH0BlhfjvWS7/Ar3hGf1nnc9WgopPKAquLCSTvBVa6pT+nd/3whzu+zFxtQgDvR7aR9p

xR25704p/fCYcdgZfYPVjld938kh8yubXf1mbQ2LDj
5tzEuW7diBnb3ZHuk4bTws0e5Y/drNmZ6RcXGyAbRvVQ6kWvm8CyzHTPRHJCyCkazRkWrcPQGoNezL7I

BeEh8/Yamg7zmSCBAbxNGX88XYAAklOONzu9qorWHno/kPoPTjj7LUojhPbruHvIX0KlIGTjk4ul3yxP

25qJh+Q2LPlDbWVvU+4cUdXl08o4cH3nn94K4OMJZx
7SYbs04auwatEuEZaiolh+mFnWt6JqunMgpDvq84ELR2uQora6cWcvRV1mJQ0lgNxNpYAvLk5wIHSU+1

YnBAv0tzQjneH9tNZZ/rqdjPYaemXxvr08W6VsYH0ERUmbMCV2R7gxP7kIPfIDpYf9gME0IkvS6YqLGI

kkPMLsd9o9aIoPW30Rk68uPAan75RRDgPdNmKP/ZKm
tAKb3BaHYYZPW164ODnpIjUYnpG7Ei0oy3pk+qNw3DCbyZzrufKex8qyfHrn68DGWh3mY19LM/te+xIp

BdeoAE/COOgNE3K2yFPY9uMs4/f3G/UKuR4AQOXFiWvzih7ztYmeXKlvfNO/cSRAWv5xPq9gHMZ+jL4X

c0vBAk9z4UhuldZEjhXqCrfVAE4Mbq2FnYQwvczCxF
djCxJkMCadlIpwjvVCyD9Gg1EEX5I0zp0cJEhG+/6uZ7/rFG4XILTwbuxR+qMPSzVOByNpwUdD64CkXN

8+A7Y2aKKbiQ6pNUO4irjRPW8f/5Rd3jxXP/zUp/Ch41PV2tRfBxWImpKKmfBkWPB2cFP1GtdK7iFb6N

GiC4KlehQuaYGcslVVmuULHUV2vbvxwK8v326XD0q+
ZkT3Qel1RVWBd4c/0FXNKX8Gy7/VSWs7fprJ1TxEki/CXTKuGJP1qqqB65hdUU3UJ+sTBfSg4yCeKNxd

+YLeJk1fgMDX/UgXH1omygaUZ6SxWlM+7EpVNjd3Ebqu58NbzWd9PVFEzQZSzQMy3cHxC51A84SnfKVw

lxdZiWpH9IqCGwp8mB3+leLY3iFe1OSpmulHrNmUWc
6j1k/61CS13Yc+FmSvg6GjXm1SaNWB6AlLhqaHrkd46wqw4im+JlVUxGRKr8+NwBJvMt1Yb3LprbyrKA

Q8NLkcaJELjns/2IxCQonyYy1tb40r7Scrj7jAi80gvWWntgQvHq7aaAs2qvQ5U2GMfQLyykc0I0YTkt

zE1NlMQyJWqOBjaDHLMq6BOQ3mWSa4JPQ++tvLIjnU
mvVyGTBpF7nd8XCjjkq7ZI9mrhdaHMozaIlRBoKSW7a8zGJVVYJUCXS8e6P0iCDNTcElpPC30af+KhEB

bcEc10KCDbeQ99IQjDlQmTbsDZgU8KsrMoU8Z2sC+4vcpB1lv8VY6aMMIIuxA5yCdsYIcBScOf4Ywrya

l1YY02PITC9aqF7ov14OKQb3KCzoer/NQlE3t/xWne
49TnVlTJJMsOvAd5SZ7UQLYIy9oYHcxc25PiT97oQ87DIJdZFYI//q00/wfMCG0is+UPQ+dDg+tYy/cp

xHP7eKaUT0txC08n2u4DG8xSw0bVnRtoGkVN/B+GLQApTRlZpK1VEp+oq+oo2bwrI8nfNesXQNyWg6UX

xcUREkaUBPyPOj0lNl7mypF1xqrOP4i//8AWstN9nO
cVK9dwBtFcJ7PfmDssRbD+SjbeJgJIRmx4okIO2qSNcA9vO0/GZVwOWVbhwPpxrmD7Pf2MUEBw3VaiSk

xvagI/HFX/GSivSWQ+Asa4BbVB4HRkm2n8XGNe0HIO5ueu3q3eVb2jtT7L7B+p1+OHubh4CpFEm/faOf

RuIitf2rzj4bvXtUeGLelmlXHCgym1YwXnElCeu3Wu
RojdGf7xl69TWgKk3pJspGTY006PlNHVYvEstvAMAzMLcHj9YP4/TqdrDvzMEeOsg4w9ymHaMcRbFo6b

lguAgXArIGY2hOaZ3ic/qwbQcjb2fcELZotw6vsM/NWd3LIrwY0kIXNdCOqHT4nHM7V/c26CGJw8X5CQ

DWSrWRba5F561hMLcQQ6w02kCBEyYW1e5auynsoFlS
cokJhQL8EVhjolfKeaaac8B5d0qGD+isT2lL5XDcAYRrs7Th/cY3aE+Sf48ogcSnDP8IH09OHgLQzd+Y

[id number]5OOdeSqKs/RRfiU+TapHhPxQj6ibRX9eQNM95aB3WhXy8Pqufr

XsnVZddMHOf9vcKtul2sLumwhHeb+91pxVvl2dFdVn
OKXnBtBMl2H4BDCffNMjAKBd5YZQ5pr9gZZA7zEIWZMbVQzco1LmspEgSHfNnMWpi4Ey6i2l89A/rums

T4jqUlOsIXAvJ88TmMXwywni31mL90R/81r6ShxVzvVabFgIc8CUMue0wm7q+beW2pfooezQFSEO5zl8

r7q5vRqaU/KHAVBF5c2D4Uwmr4UmQ26nGSjOon0XAS
x3vLL4RdoTROKPYB/DvjC2d5zkha9OyNH0sUbVl1eq9RCGEoCqnCM8Il1VPlf5vJxm6O8MGDG/grLlC+

rMblrQtQzDkOe7wqTKVc0L9NqQRl6digqmpls/x3f3lY82Dv95b3VUQmsurmu9MbR4ep0A1XJoPQ5Soe

hrq8b770XptdnfIxi/EHxXcriOf4P/x/HchwT9VcOk
WWcq6ZKLYsQOJQBn+xvw8htdSQPsiaw1AbfG5qxR16uT1GGkIqfGojVzCUojEk1FTx5hsnx43tJD34H3

sOc6IIXgqgbne2g8e3CWOv99tVYgtYvHpgTBmAmxMD0ttbOU9+dtchhYUdn/EQcHHuM8pzUHy96Mgjaf

DjHJc+R+XscCs1vNzYJGuB07GINd5jlTm8/DsgVK4l
ps/3Yr+wtTBEOG0jaRcoKI6adDuVnlDCe/vLHu8sf/4/9JR65wlmaupXc5pkvnSsc6uYzWrryPQDSYNo

/I4DB6c/ave6edeX8fEfW8t7ez10eyd7mWAJvu+2IU2MWHleArLAUk1MIuH3fMfJIJFfoZZN8hY5S9xD

nWANPh+q1KNtHHwOvOBdG6nLmxQNi7d3fn3sTX9AJI
I1kBHur68XBMrXaNf7C6yHo70VlGL7xaysJEVGiQ5gg4iwbEGRYqbeGcMWWsX23nDTMDGkEP7KPwuNNe

5CL8sK88rKjEU/oECLl4Y71q0AZlpmy9fx9o64gPJQonjCFgAvc1JEhLiATWmuCKV51HWeZz9LqJ/uvR

L/2B0x3koJGAxG4wjy6cjsTlOuvi4/RUMtWn9nc0u8
gBRMtikaIclkeBocZbgp53dBaZnVUr5PuCIkmqfK2NP2eDHWb/yvNPxVPWoasugb9dg8a2kZ1QFZEmQj

XNtlXlsmdgKrLBpV00sLlyR8vUEV04lqbRIOD6idaIFgtJ3jPs7pC7+/juXU5hgnG3pKDoJ1R5wzrt9e

VO16sxQpOaAO5WMzHm2mAUAjaQb/bo9ghT40WGrvea
7TQpq/yH+miJD7SeWTQ/F7jjdwFJ2nsbUTVnHyFSnTG9JIbRusVTRRrsMpvejjXuB9F5ruw1rozXkzlS

D9MS3LRj3gt8/2hSeesW1Nam6xjClJ11fUUvPCQOtc+3sanGj5zWB2z6JuKHXrNHnaoAfOjCu7GLUyPp

RHp+g87mIA8E62W36bvruxUjzVk+jkVn/rpyTrkfi3
5t4jkPP6jdabUZ2NJbIbT4wjJwxsff2jUwAnIuuqD8/RWXWuLLLiFX5cYJxsQZfDijGQzGSbjh2kRmHm

H1UlM5blHtB8LgartlR8SF5e57+SfZjUKp5MZ0aD9aeR1RkX0NUPh1BV5pmCCGhvWtkyMfFoJVQ4xPeb

qqyFEr+tBxKDzc2Pjucqe2DqbldkV6fGyrwCwMGrBq
vxDm9m7UTnZahnBOL6azvxt1tBHS5UpmOb1pWAHzFpcijhpS8QQ88EajJUhgAcQNTCGlPXQBgRa65f6I

+oVtRW+f1qbGltQNM3PvSc4vtN8JAyGKf31Mkk0eV9VJ7EVvu1JiydaTfN/rKIMkn2zXT03Tt7+3bhHa

nfZFdyS/ubFabpuDYGTiejIgoGlZsIAfts1CQ+NBIs
ozNVGg2OINbZagK5ZMD7ymXfs1QTS/oy4U5IyGJAa40aBeJKdobo0RF5pQJVaba200s5/nMdeaRX7Pbb

bFm6DBzHGkU6eXghPKCE3wKW+oQPI9EX4YO/MUSLBL2K8Movttt7ICTlSIHq/rIAUQDoZPQhxVazjSed

ck2qZp2q8slRxSm09+1+2bhCS3sBEQMhO5iDE5i/T5
3gvWDIi07yXnzvKvtNjBkG7ry/QadVUxD+nr5oFo7hYVev7GN+BrrBDhfOX9WtzDaQNXw1Fvz8b+96oJ

heuICuqG8eVttf5mvfg2ra5UeF8hEXlUA9Zgk6S+NRQyhlDq/N70w5/o5YquM8Ao2XzaUtSxJQsTpkcD

qOlq115WV98Nf/yH3C61o6vzA8PCZPMDTJ2va64SLj
accpghGji9ww/9c9LlGtd3B1+PHj0cncYCtYbEYaezetQ8GIKhavhO2f1zxHTE99RX1G+69G+PSWpUzR

j5RCpcy0FwaPDhUO1ijJeN/m3chNNy3WLoF2Gvt+2mVHc95EItoSXqcxEq8M/Z+rqUdu623uADf6icEV

Eu3O0DT8GOMRae6N8tolTQYtv/pp3jRDVVEKLDyrMK
74zfqUaxc8aTpwg4sEuxjWqk3k84phmw+TAszTzEqAEduS7MMxzF3csSL5dEvzF+pt2hVCh48w271q91

GU4huaMH6onrIkzGQYRIn4McU35qMdIXl8ZSflzybqP7BiUHg75MRf/AnmuxUzpacQCy0UiuOEzEqJej

D/Bd+Dk/DqBsOKFan9Qs3m3EWpwSSetYcXvw9DAGie
6CnwzRJD4EHgSIj4eVKiftq6l9qWrR1LeLwnORjsf4kyYCAHSaRIuySq1FZP1I9+/ShcqrsPLNcS42n3

0uREUKbUa+O108mY61GDxiBeAIk4qukMTzfjgubNLXiEpemIyPx+nwBvb70YgempCtZ/diJ/OWnBYbwy

v6l25GSxNRiwvREu3XalcnHEmvMUKXnr3pTmNySVdD
o5IhMp5G/VjEhQh6T1p6Z0wTphtXx7YKdUpGiOadP9eqlIiLaYoEQxaynDpiaEa9DvFQ1ggAH6+z28Py

DTYosIT07ICQja4lK3zPUgUzYQjEGifWBVY/k2iWeWu5raaObEARJ4IeUg6XvuCg7RD/WsoKOBnwWTTO

wfk3xRUdafOGieWcuBAO6SKuKstpvKYWabXYPXLfhB
j+hxsj3mfIe6rlbx2TxfUi0EmNrAX1BxL+PBKtIpK57F6bsz4BVKPyYvmZf/xErAgc80FmuqhzVyuU6L

R7X7gGummUlH2agmx2Xo+RGY3T8h3V+W3zcfJSELynlbOxaOiTNmvlPCIpOONbS+nyeXPRk5NY39Wjqc

kjcvlB/mJZBhpU8ERBllus17ySg5eFcpWs13PJDqQ6
u9cRvsOg03RRDtYILIpuRjWPl8tX9o/6MTb8gX+sB9W1NYZbCri7MhhXr25MgQQtkVvl0wXfqNF6Iyc5

NR3PTHW+awZTnf+GY+R0hMsBO9N7j4LUm1XyxjokFJiyaKV5NHgeuXoNEj5KkM0/x4ddfldJNHdeaX9g

ydkRqYCQH146ndIshUXzd6M8sJtkmSKiJUC0zfRmUO
ZdXhyPURWwLykvVNgTZhE0YSAbpOrN1L1NcHBakfIT9scW0rpooybio8fVI0rq1TpPBJ4QTtA+TDgdY4

26RBiN34XMTpUNeQBBYrLeBG1cOSUHIys80lr3LSXKUkUEL3r46rlr/332Ou57n2g4UlLAfyV6fUk0FK

F+NDJxY8cRKAow2h+YimroQDO+tww7rjZ/2Q55zKaW
gVbgdE5w2zQtWPUcQcgeJJ70f8pE5OGBE00DgY3+rNhv0Wcdr0vlT+I2xQ70C7b54iMzl7cHAv6DOij7

uk6bQnZxkyqiCrvaZakY0Ffxw1WRk6FpdWikyzyPoUqFT8JjzIAyu1MzSxlJjxCwMnMb6Rxzv1TAEv6e

pLZ/0/tcIsEXYW3AT41djytKXz39ZfzocUsdg09TRR
Eqs4H2+amz4GgLfkvQ2jAzgkGxHxFhhpdPzp1a0I7Tx6BWFlkXvwInjlMatGbhPfFogUB+VEx3vlf6R2

TEzE6EyR3kDw0ry7Vyk9aWpVccJnhQm8Lc1BWM5NTCFtzbx2gkFTImq8A+rn0mtIZzhS9JSt8zNmnvXs

MCD6P2PL7hooCmR7D6TOmFdnkrFf4BgW5kGndTzybV
Ygs1cLk++jSbIqvqzVBSbRue3UiDPR7MSbhWgtoucKUZvmpJtbu/ri3mEuCZbeyEwyRCtBkAwrzmlQ88

iVPUSoWnS+eKjq/+efozXhbNfBBnNLWEnpwsuI3JkRBJZyIdwO3wZmLbpny5A3TlNrsymXoEOAj/EBDc

nCcN6iZPkAoEzQk7ARdUK0b7rEFeoj9vMKprPBMRSo
xMbvPcd7kYbGIvMm4hB6TrUMARxrLZz8HtX8uvLL2WMvibEUkyyy+oGAk7Ur2Ap/rTMBxuWIhPbsHYcM

SQbeI+Q4Ge3F9Jk0rhahE3KDfgQKv4m4T+QILWWIGrqSFplvZd73KvFHiAtsAMSoh8sYG4Q+bTqvOnE1

KkGeRz71+qatOt3SIcYjfQHO5/F35hV2yGLriKz47t
sLy2ero9TANaZ/NQvneKkk3+88A8HCjFL4608+I5ZzYbaoZQhzmL1O/f1nYZkgpG/skVTimI1k3ylq1q

CJeZH+jGlgfXve0YPF9Eqt5PtqiI2Ko28DR9Z5dHe/P1b8x7TohuXjd3X5Dn2qee+Tm/ZU/8t3x2JSse

noWZ2afQ2ic82xFXHuG7aJrkWj+C3ukRQOdvYEUWlg
DzDIoKG+dyfK9mFhcbjkiMwejHxquH7LrWjA10YENZptg1H0Iq1QHjs8B09KOHoS+uT/SwAsbwhiwh8B

IXaurkoIa84N4+F7Gyv8OnCdvaysYRPCsvxiXKt9gV+hATnhqwaRnI1MhHVlAvtKcOkJk7BNFXhZsapm

zI32ZuPiXxVs+/jfJlXt2iLCSYouWjATuLKZPeW25l
ZPnazQkluvFb13Zr34rWeIaHKOL/3nG+U+y8mbwj2ITiaB7Xy2+m+cTHxwGOvufHNnpz7t/L+kOuZtoj

gIJ8vGXpg0QuKKOLpQHhARjdWNyLb8Kq8LUesMWSNyMNcJpk5DrfjsT6qUFfam4OdvtKv57SvAKdWD7c

xDKD37ECt6rIhIThQ65jjucwU6LK9OMyPIOA07lRJu
ejP4eYLIc3cjp+V/ZIHTqnaayicJPQpHzmwNEgFE2znxsdO8A77nvWrIOYeiShwvZWQuGzWR7jjjUquT

CHg32TG2+ck9GB5KEig+GWVEhb73XodCdAtMRjFOny4QX3Pi8GH+SXqpCDUBqUku7zRzSRgsP1fvsPPN

wKJhgPi/KfTNopHBfM/95wdR4K573qqkQ6VdMPPUrr
WhpHq6YJApskkdWJF6ZRhzxPQ0owWkQIWz348L9Lj+K+I12NlLK/FXAr1cjJRugwNVf0NjDwUajacShg

sHfEsmSE0jNi0iowyxbKYyctJaVsrK1ZPecQ5cfeRBn+8gnDxEu5UqmobGe35auCD9maXvzqCPN7J693

VAcNRvk0JGVRPCvblT51i4XFyNvDx6jnK+YfnP6Fw4
WdCdHc4jJsLhxuyOi0l2BNHaI4bmS16u4QaTeTOpsQZuZxidXtlVnlk0iSaGAiL/+PbyriY6nsnQmIo7

WPSnrb5SFdTDix9Vz/X6hkSi//9fKhuvJJRwOGCtjczwHqBw1cZ61KCPye4TbeqOPSDRabcpuzEJU5VV

OriiGjXLR4xgiKYnp1Owr7Qoewq/rJEEkzZK85/KHF
EB+oLOgcjU3s/k4b1zrYx6cd9gdWxLiUu7lS8akhbbo14RMqBLEpZYiYigLm4VMrb3gDQ0MpV4f5+mTs

mOtxs3tShz7Rj4NyByru4VpStl7Yp9Gmpa6PsgloHOGYk48G5WsI0CPqri4OdOPRGwzVPP9E5gUGGrvP

toTxZeffwcmj/w1AJxkxk/LDGglVBpbWOdAiIlLyU/
hJl3vnUJyyTLrcD974WkF96QJw+CTFarUGRfnmUHM3r3eqAaOudfZMSpTh2KETUvSA1hSHUQQo2vZutL

i69f2tA6vGoC8piDn2N31KFdQkPvBpGwFqeim+eQmsknuBfbvGUBougT7a7nRMh7wgjjAQ6l1iGQgPl5

RjdSVicBKzyZcdsvhFSOxmahzVUhAMjAdcWVe9pj1+
Yei0Ejs+jkmU8eprAu1bkbhXRDuXo4WSm/mi8+Nv2TCoMPoShL5zySFn8wG5yaghNnWJyNguvrfc+oYt

TXeVeNlxVuKubS1qgJ8vQxMxMONCh0NBpXPgBBd02Q/PrKyqS8ifDICJOwoXitmszEw8rzfDv3+iEp/T

IZlr9H1F6uCRbwR+ZcH8jniMXzOefiZkgUB8XHRHzN
6D1iNorhsoSfWnTxgdYuGiTVUj6GlqbS++tvQHbgs9k5MxUQrfXx7ZcvDGOxLDUWNbg8B4tHxV3CYW6r

S0xnAO+ZFsjjoJrzL1EAl8/frIRqM+kWuCTOktTmhzripxpFsGei5wvbTsvcsQzdFI8qx3wddbZK/VLM

0NxecRaT0NwyhkFEB2rbS5emmHocVUc91bHlb+dNQU
t0/C/eoSq/gW6psLO75Wfnb/8o3Xf9sKOEbBcrO73RVx2aqd3eaObZbp37DCRBeaHf9jDLb4T+04OH9N

dAMYYH10DIDQzrMgo1v7NnNzCf9R613wdi1W/eYG65bNt4o8ymwh2wzf5IKmRdeqa+enWiMDnRT1V3bh

n/go8xO4VigboyZoBZSEk4RoF0MOdbancCw58XR/yL
CJ10+nxLOAy028Fl6XecrirM+1bpEzSu9YvkhIakVk4W427CJLzmgmfRfERTMYhPZ3QmgnTcGKcZcKDU

guFqTosGilBwl8jaY4/CPSs8x5xPsNMRkWIwMifYPu4RLeoXGDD7KGtrN+B2cO1KahAjHM8c1aIWfHig

5ZKk7WflkdlVTsDdCjvjb8H6diclDonKrrxQgVibGw
1wmma+kWxnqhV64PXFwXqx3aods2w08K/LnflIdckYkbi8Ayun/7Onv0BCBt95qVxIN65r/lEzWC6Cdy

CYSrH0TReQ8tYX+HzRcdJDxpocQPcc1ORpGYaTv6w6r+zcHIVPBYcuVYZ1Y4xQbHz5DBFNGczPBzaGIi

LDfX80y21nkm6sCRcw09sKO1gc2l/k5IDUuWPbfYPI
OO3586Yll6uMA+vqKJH+xpmlau97VPSuk5nR+0lSZkLzR7GXlyt+GLYQeQl8w9R1061fYMWQQtq8mgT5

t2/FCxk11+7ms4IZbwPvKtaBsED585yXHrN5jjL6Zuvtd5WWp3VZRlM6YkHthhZYO2I3oMAQVO7Kd3QW

MHgpZOX9a9txOrDJ6Z3z2iO0AGVji6haHV+0bDNnnh
FN33xry2VQsCfYPNFb5IIydqNY76Vf11ZOSMmmDbevQD+96TxuHN1R1HPfZwWu5oFIM+e2/xHrQ5fDEr

gRxrOKDUstXM/JR1DvP8VaYDs6xSzqTIcMP9PAO8CE7YKCgPVJD5PvUsNJ2VMZ89u89jwXaHJJsBxZKq

Xi6WdLRRXLckKjKN59E7RPN0sXTCNOdMuVU+aPGe7b
LkvqGTbGP4fq7dgMOl4YzWaRdxe2y8//jJfZ5Hm5XSyJ//RkKYHl8rftW+LnLeUddwU6Osk+H6k7vGvt

FhnfkNCON8DpK8eNQnvkFI7WJYRqqZHdqbV4wSVjaodTWJ2cvL4rLekYfxk9bv4nUCbHU3bHxIqNIp7J

n9w+QbTXnhffkOocF3+tpFG9h/N0y1Nv5fCaDQOCMK
AHb9p6e4/joee1cAXSAHxPlIiCa2JRnC3zZ19sNn6ncs4jQl/YA/MV9xhF2UCQsfJJs6h6xVupw4Pc1L

3pc+m0FZ9v5iXf1Jh+uo/nMrnY4rEHFFE+vP55/afXv/0+6j+Kabf1Vnj7Dcrn325bG9xE0Dt8Yi+PSJ

LqKbIBmCKIRWlGs9X/rlTrNJgkb17uEk7oN2OJrF6c
pN7dCKtji8sprs9n0+ue45njT0YjnNTe5qP9Kl8fXBlHB+WeIL2ceyUn1mdUgzTx24Bw5fiMFjwo+MBz

GdrchM65AnReVM+QTXBe7brZGPEBBTcPsc7OG6d3kuN3KN3/AyFJ2hGusk/f1Svo8+9llIaxAen/Uo6X

zF+ElNq5r4f698bFwNcygL+EAfoeAYEGio+NWZG8qE
/2Li4dMmFlxtg5aE6QjYTMfuiIrIQi7wlceQnOFujc/B/H4zG0c30y0TPbHpALwJ9rPyRdWiHKQ1CYcO

Ryc/bvOAJl/Cej0WRE2N4F0c+qpr/G7bcVKGOf3C+qsvzHagxINNBk/p6jzN/Rm+xCpYpZPTuqjv1PrN

hjRNYaHJPYkiXCkfW8SnsCmjmmvwbDc3qtoWJefoGn
1lAhjn+pjVSoZKTqwmdp48KebIyurtQeC3wLcD4PEddIXrmhoceXPSdjdlHd+Pe9dKMT82Zvq+nxmoeE

zVrGclzIwSKwZt8CH2EZbbhTM6BdrSDhMQ3idiDmONqvuZgGi/T/fRlmXL0Z7zlsMWXwXttmtuO6tPrS

qCe5lIcclUOPGymsBbmUSMdW4BaxAPRVFOSRqNCjGx
OYwmMsixA7y30/9jnj++36k9921k812X/zz/hm4oZlHc+spYoGMUKGyETbHWEBOGPgDZ2UHZFwHJw1Xy

KKLmuBz4+KMrn4Grf/DCU60c526/Ovy+2/s1IxodHMDe9Zak34NCav5uFw6RPr08CTISOzqZ1L0W/vFf

K/9Tl6Y7HakESZ07yGNTsawX26KnriGsEaeAv+A2QK
/878KdxqucUuBU38tb/QN8O70rIHlulqepI0XS5MS3xYEMXipijB3v/ixKy3Nk7g2F+4pvxfgVnfIHDX

KgaBVRNcLEpRTGeq45V8J9tAzI0pj/0kFRxNs1Cag0W9CFcw8yD1PZ0NKM26rNZYTl1e0PRH+c+yOX6h

pqTR2DkcnzsuI8P+6noGC8MF+R3uQFpWLImQS8Ws7W
0r1beLiTSYJA+APew+TXYqVul4QJsVraFsY2tiDLK/ufWgimD79TElkMcwxSS5xKwrdLsQv8W81UhU7R

tNnoYDLh2wgP5HWCiA12OYRf7SlPT4hbQpT1k+y///rjn5/3Btx8mtDcPriW5VDZ2/YX4DVXpl427HI4

nBqnbF6ug17/UtpJOJ+qa8VfDeHfBqzhsrBqc8p30y
O6Z37BQ2frtAvDZ7DttWV73m4oFvSoE434Xvpnh/IW++ZSidHWueW2QFm6q9YN11qz1uzZ+o55TIewkI

lKLE3Ysdv988i0nCR31ijT5M/H2Zv+cDfyy2dJq/xG/An/HrzmwOllm5GC1bN7p8qTfdfw0/y4K/LY5V

7ynrjaz/k1PtBTxnwwixQSuM6jjb0M8ARVUK73tU1f
lHEiVwV2sqXD4Cnl4Pj5Dl4/VE3cvdyhtWyiXQLFCufmh7MfVjO2AmJ1L/JUt+b6CtVj7/fKh8lyvWw/

00+Cj7H7xcX2e1GahF3zC3pQkF1k2e0uq2SBfki/1vUw0XH2oeezrLyathuyRug0Yy9ifuYUro/SDoP7

ZEL7rgxhFcVs53hwvnDYopQqsqOVQRAc1i/vFWCPJf
UXmTUF+IOTNOgMWi7Kq5t0u7zvffLCSlAgVJCVnMGYM8tG3YdEWWkGmwz/T8++IEit1jc9eI/1/Dj/81

bxgW7ZLj1PHsicin0yabt9KKOAVtENhS/b89Bi2OL9PTuuIVpRO/sI88i9l6kiAUG7Ly7dx/RHa16C1e

99fY4uIBJfoP7Zz9uLpcrfIEoz82fRs3ApkQkidlmZ
yVK06OjW30Ucb60jqDTRjo4RYVhOGPyOOJCBXKOCK0MtRKjf2f23wnQcoa3Sq8d5PdjEIbOBIQWEchah

LW5uzkQwIrQ9P6zHSVGWMUnZzQt7S1JJruXB6dcN3p0LCwA1x95X4/1ekOP7kO8ADnk5Qs/NqqyaJE/a

VEXBRTtJHthrouX3skrys0nUSF/Pb+sHBndmfsXAqD
Oefrfax/IozMdqjEbqjrV6ENCVvrjp+qGKtpARxAGY2w3P28bH3qxAE7FFaH5O5fOiSiadrzhgVmJNtO

6xaL9kW9u3XHuqpBW87y/zwT5S8r4+ebsnJtydIPk34VPl8/jr9xhG/pU0bBp8PNcyyIP+AR5Z4q3Jum

urA47qaUmt08HeG2/ymsXy+WhuTIUDUOAWTP0rbOGm
J4HCgdwtQDhO/zLqfIG+ifKoWY7xfQ75Ez2b4UsHqSENc/aHqzw7fYWM9CJzoMZOwKO3zhjcY0X60V7f

Ef7mZO0PnpM09Nf18s7wF6W9519NLRzTISHxfFK8BNHD5+LkU1Z0RxJbJtB8o7RRa3ORZGS+rZKZfugO

PfTGvSa1BcbMkq1lxx9v9UhRUdRwxrBO8+s6DxSjZn
cWEoty2x3zvDRDCwH4O3+uhOgyhtajN3tIsC8CBCaHd/mzA309Kx9tnO7Q8DULHEJoJC4V7VaI01VgRu

4lBS9JB/q+66peyh8g6NS556xk2F+YSQuRkpI4x79ern89KKy18jZcff/V4LCokxAM1MkqkxfNYx7zub

RZwuXd4Ox/Ciw4X0FXFXA01sBlBaGJtJMXLyz/nicU
5ESRhtgt+5u4EQf+hjBy5hE7wFbjPeYryIfiUwDW2gzmyzNfL3vDrcw+h2vL3Hm5BH3R1WFi6MdQOjBT

RTB02MHQTRnX78Xu75t5f5A2cOya0A8bwJ9HpcUyjWsLXs4RqehTiKui93acUCNyQodqW3VH1zYrNy/r

0FCn2aUND1q1IDqxfz6e8biz8RnUwOoXHsgjtuMsTC
UEir3FasvsRjpQLxhbFb23hCLNEFk9Ytg2g//CD8YpWC68jnBK+NJT+idAn0+7eWmdoj6yFMOKcO+Hvp

Su5UQRdxEKClI0kSH2c7/uflt1uT7idmDdU0ngd78NBxej7SxuKBzGGoJf3wtRMsg04N4mD/TgS+mvSA

JIPuQraA4wOS82Qv2NfsnA8bz47XQjU2W4OCUttVF/
udn1D1RBL9CpLeEYQHYVknu4Q5FIeE8nZ5mqE4fjVjBFxkByQ6LeqGjFVNswDR6sAgVg69L6WwZsYfda

GnZ/XZD5k/4WIn8YIELVOissQVw+8UaKdsJAlDpa8DqjrXBeySwqPtYE72sDWoJvxeEbfu99OmHslBoV

K+IxyP9V6bPAZ45xCcAafF0BMgh1GmTpVTlKBJRK8V
g9o900r9NeXzUbzyo0+ahhST5++1lR1Qceca/RIsoIr+a+DaeeCuj89n4RG86m5sIBOBFvI86oVqkXQb

Pcl6wAnIWmq7FvHRj0R3yI/yNNLlGtssw7YkgklrbumXgcHfVuCSuaFAewxsdRA58lf0suLYqRxBbUR3

YPMANJ9qZJA6iDTYgCKpHBr3ZnFX714RlpgPFIljKi
JN+IflqXvHAe7WBkz+0vk0VM7h+qxb4MAfKboVdi8NLkznVa5+Bi3boXxKWGY0oBolBc1fnrY09/mme3

wffdn9AwDs3dTy7ayqj+NIGg2c02VQpotK05GkwFj2aP+f1zUmDnhJkHdagAZeN0afy4bNipgFDw75wg

CuYtfUftjWZgkpkZyyfha1xXXlglokhT8U9BCDqNTH
Cw8j6nEseK3hySUOD8xvFpujfAASmNE2UDA0d7hYAxwudMGs8vBIpJSC+SNbyg9jnvCggeHuRfibRma9

IZgZVj1pd8AhcpeFPI4h+QZ+kW0uCbAXkDr8jNR3PnmBjnJuKozfPGdgJdvb92CG7RyThCgQNy5lZ6UC

FRqS0bDNOaPJuYbn5nUgZxUDyO1wBvIUUj8bkPDoH3
ycHsuG4nRV+7uIxjYC/AjU3oshBXh68ERNfSSKrUr30RQVzt5z1lusbnFe1Ywwh6mW2jm5R0SIZmzlQl

krBmIsiRDcul7w73am54oZzpzgsuuCzLUkXmGa85Bb/oRukxfyZlNHGRcowDHf8ZFmckE8lG8CcUqnhw

GUOJiPPyYtKktcuhFwrcU7GFA514RDSH/EpFGZy19M
JMr/j2+Z5d5vzD0nDfk09oNJdUApZFr9pRM5nKKkXH5gOgiFmFEQtYRC+6ebEAbSrVFF8S0OqmEZtUHl

bWbLwJg0woRzhBDF5OWyJ5YFOr8eq5PiX1WJllYsU3osDVigdHcIpmiLb3tjTiXXBnLOajyHqBT53Hmm

rYqjRjpwvdxJZaUQFs6cA8jb6Drm9twbCzPqZqXP4a
CB0hDij3e9KVc73nghmiVFX4c4QAf5L0JbABC8pEgMEhbNuzbwFMi8srccWtEcT2thfMyzOlOSNKCQKD

mA8gYRETH2AlB2IpwpiyljGmm/o87roBAuJhPdDy4+OQRAsnO4yopmhPQxpp3bF6ThYGMLZzxnZ/0yX4

Ukm+xiLllP/fW9VnYaWI1YHZbTLJ+ShSr04TDJFb4k
tM7IXmpd+Lx4wd/8PbHfyR9UrtH7hjZRS4oDiBGhl5af7Gyz61PH1Uu10fjs8Yl++qckynrzK3q4EKMQ

fNg/9nQwq8ejRfgxQi6N2uTpaj4lI9XY6nnwEWi48E9o63INb73aQnwax5pahUr/BweBDx0z/vEFGGMg

9knlPRvFGZmlqTSgDopI4w/7BF8fxXFQ+0W7S3f7Jc
ipGHrZ4cL9xwAtU6CCFrDfjcVKYuUZ9IzVolm1zamy2rbimR6sjmKxUSK75piIYX/MleRk/OixG2tC5q

1DgG/ooMvT+D4SsgDqfsBWvxYJmlKm8o76ZJzU9bTbJWNCTDTEkIXywdeVCTRdfRvZ79nCIXiwkJzj1h

OHn1Mi5qqeZxhP5LX9yWEH4vRq276epYthYRu58HAE
joUAFl4vQHKTzdi7rB9Ql7q/blj1XCprLuK2ubR4D0imwm4YvE9oYvSb210N63oHvEjk61YMJZFygUlU

3QJcOgjqL44LP0dJj5gRKpKpL/sr+vOtfPM14MTmE+bxelKUr7FNq3NYEoSwakmVd0KVdUv0YNlDEq2c

XeIkmyVd6qoEFVrJVFRnHBCtff80tow4RGwJFgD392
3JQUWYKvCNMIngx/9nFfAQNtRXeKt8BvDoTkGQGammHkQxGR7B3PZbf7HN/Rhh1tqLzBlGd+VKWZKY3E

iiKVN18xju/brNkCDDhmu4RRDd0JvqFm+9zPrK0WPl5yAjjkEAlVXGZG6fUEoZKDaoZ5At5wUC+XT/0k

a1/v74Ze2XwUiE0aHcWOZKVLzPk3nKse/PZlcWK7sy
OB2HV3OPuKA84CGp6oVxBjbs79N1vATwTURHNV98K1Av2K/pqzsCMkf6mz6H52jjeUV/raCGVEWsnk2p

VurkpL6QSIHz/J9mwvGaXoGvNXL31NJbkursAmamLhKKdIjzi3JOdpfxO4yZlFC+haItVfoEWrN8LTir

HKdcwOAb0jw8qlMKFOLXgvpCB2AwQ7O2obU1FYvoqw
s9bYGeoGAVSr2kFaQSXDBEdvHj8kZ8NO0QoODhqnCQz5vVzuleDuMvYqQqfWAUZiR9zvkSQj+b8+y7ON

eWXL4kBkRFG0s6BuaFGD4Oq4sdOaCsEEiYRt3rFudWH3sjSXLScAg1K8W/ACQzXdjElDsDlUBNBdBfXe

kP1rdyNPMUwFqXh49LlZ6PiwggazDMSqSj3uusLxzz
PMGikC8T0083VScbQUUrWuS22WAGTWguH+2lDkO1V1HSxb5ajHSUCX4wDOaT72mMSoWUu6Jekt1MC3OL

DZOEDNsarP8E1nx+Ol/Ob3wAw7EhxR9O6plgPsdd0qNWud8pA9kyekMEiThiOg+yHSsM4PiZePVM5KIr

URQXCRkQJ6adzEMpcl4Og+Wa1f8oSnAFvexNElAG8R
54K7CeXfmqrXQ3J52oO4kPJQNYWw6kToHpQjeFr9/SCOstS/NpveCFWNDBDlPn7H9T9127X0neEiClFo

f+judlSDMqjCjVG7vynLJsePHIYiIf+z1rzBh4l+po46kG1mDDH2ETgnkyFmjeuzQ2RtwxelUBHiBcJR

hVWrCcZHEuhyTbgVxp50ilyW0s2tebLsSBHhc67Hxw
vgw0GU6y6dCnsBVX79DVbn5QLKVHphULdfNoO31aO9Xy3u32DDIMNmIASa8RM5PqJjtFzmCYkmCNmvOz

oaj6uHocSRUmnlUBotWTxFe/H69BIwfhgkTpBb6yWEjdghr/xJ+8czQuQZvw4FdDLvFDJpU2dSD6yHIx

yPW70IRlZgN5km5DN8VKl8v8d17CWgZtjnihvME/Dt
MRdb6/YIWddR4o7D8mP+VfuMHeh6jlaEjrfcsFoCSURHa9S8FOfckJgbJotUF5vMMeEOuJagytJAXk4B

ivHGsZaY072n9RApixeJAu7eSKqe8s/Hzjcq7R+Y63PSq1A5BYNkbjMWCyrzm46UeH2NBOh7Jbzu/XrF

2kEsJzeoMks8bq9s7JiPpgsfTaSscNzKAD9KIM7RbK
JTnPrK7VQ1CQmsm7ueoD2T0sWDopw/dzUHlXLRsfY3mzzYNZPNFu+E/angxJuX9H4WHZFhIFeaZouFle

/6MVwnubPzVytiodjISqa63PD6Rz9qtSahSn7C5JujbNsxx/hvZ5PHUOWPWFv7LpG9ZR0Zi6prH9EEkA

iIfVfZH+okPhHkqR0Cutm/59HcjBwVWwwRVhaVJQbN
OCJTCkTVn14+wzytKw7LkuZkPTrgPEXLDZ2LPYOvdHGW6EBXsqCff+Hdl70/7+HWW/lvaPw3xi4fxuTn

dDZtFw4nlj4qPqczhP+eLhhecD3R3x68mErb6nYxZc98up5/UvVgPZm0SBfyxUKrIKmmp7yt/7VlcQ/s

pZC2SUlKHFP2WkVlgxy0Qt0V42I8UoILpHLx5xE6ya
8aj6X14FseKgiOreqSPuG+8uoSDDawE8sgemDsJ1IFpWl6YroRI0ejderMhScNWQ1cAvAjjNaPSmo6ty

Gg5R2XVjAH2ThJkhqGcYFbfyJKYxRf/iv1Qdb4Rk0Kemn+Bj/fpqgiLrIzu5LC0Sexmiym3R9bpSugMb

9WdUk9tlcj9c7j89Gm9ovHLwO/2aIBMNHh1ocG2Ycj
45T6dFka4ruPVwTOeI+cMSLJ2O3zzykheS0jHFHgcfgcZQ2Qf25QMmH+v84bsg4oRZczYyqWkTUEe8iF

BdcLlDLSKkyMzm+2rY1fwfa66o9mHYTWRnCa6x4mjwyEX+D4WuYkKGnhvEO51uk0F20kRSebw8DAI2I5

lOfQhHwNsCb3DX2YC/rH99sqUco8ARFqFlbaW44mXE
y/37ntzppa/+VUii0lp1mazoR/8NbK+AE4DSvsXqOPogHVNVc9hJtGcB6Reej4tOri3Qtn8rjTtyCLSy

IBgG/VGiRX6PngopEIiKE98gReIfc+C1hLhMDg770DZ1oB6r7ZJBxSknpPgu3PBD1O9dWa42HlLRF5vr

T7lCn1BGJBlN5gcE+wdA/ZNqRHWHeZciEZGtGM+Bdm
ry/rqTnTqwCOKjuhWoSncQmNcymzdLluVpBlGcbnGWwADlxM4JKyNjCl4CvRly7L8pLtpHhDoYbe7Lmb

5TAmkhBpSzmJZ4PdsEEzgntep8O/BwmCJHhvYKHUyi9ddw8LkUIDkZRtjTtBWXIdzctCXnZ/BNE6UOY2

dibU3mq34sPztSWtF8TVjgvCEPS3o9gpX8oKxazwiU
OWQivcOs49zwgw/ZAmtPHDd+h2m6AAPpNK19shf38kyPnb4gzEP04U65atOhiRi4hgYTcvmJj12LuUwK

FRiwT112jzsF0auGc8XPZGFL64XyeBoyWGiXriWLkkcetFTIBC+icoC/SzGN7PhDxn6LtIkM1d2n3j+A

5oS/k5CBcos7bjyllA2KoOUECeBxRkuyehBL4BCyn+
YU3TYvNs8P36pFbDzDiINI9SSn518f5aLxL7fHtyxMl++lOnFlGbuOYTilN4heRxT0tL58ZwT//lN7eL

Gfxxb/KGerjX+JZqlw5YCG50aHM05jv/6ZRJ4Ql8EPuJUEHUIxNZr6wTzdUwhKy/wPwMG6/tvs3HL/6N

flTM7+x5YIZ3nWYpcJiQJ2/q/hZBht62U0hCXGgL70
q0eamWZ4hZdJv8bHB4dIiXIoNqawHzKvVW+Z3V3g8cRH5eovPGxG/clQb/lTggvK6oKILTfIeLV+2yck

2B/q9YyHcmOHhMJnkjnHeXw2Q4JHegvl4ucP3R7oBjPngStUnmoXgT+7eAwqSErtiJhUO6eAn+Dlj5kg

RSWm8JKg8VJQxg2O45BgZGZaWzs9d+Jt9yi8ICi/AF
zz6JT8JugBsR9aS5v3a59RfvJ1Q992PP7eKzXh2yFz+LI1+1FPtTl5NjtpTBVEiZAZb+oAyh1p4CuexO

3bH1w4qUKwj2H206jTt7mcylIBHzV1dN9zQVnvUU7dH7vemqy37TpdpCOU7XYjua1z9f+Uoj4pHDrR2C

43+PefIjtoyEixN68QzkL+5PQbrIy5Fo6mqDgLIkaX
T2lneny6ETqLM0CWX4KRVE77uX67saREZ8+ul1AMPUR93sX1VUqbnx6zpRK0biFj8f3WYUzEeIKsGQVF

0xQgsTfB+WRNgc8XK5C1ekbYHGsNr0VGswPadPQcDaQhzNlfSCNbBZpH6df1EDD5qqyPjWCXHJJPNY4P

2Sg5Yj3hN4LVxpdSMon0NGShxnqneC3/VTOg60EnmI
gO05EA1dYdXKBiMvHct/6m5Owx2Ve8M75lt/7E4WdXzQ/WvISCFAT72h/Os1MAbUj/iZOspmknBVIB3w

msHXqPEFCxnIbNFAc+z7oQLKLC+6+8H4lr2Gfa/rAZS9oBX3r4d1Ph9i+0OBgTAFJh3j6uhWx/nAgeLX

I0u1PQm1PGMhGC8iitQW+/+Q/nfp5uKtLPA9JDFiKa
TwyXi8zoUKRWxOsCaRj30r311GztkZjRMXCnaaYYK7iKx5ph/gA92hYiMT4z94pIr9puEdfzFhfQ98AQ

QABXw/Ne7MbsFrBNSshpZOpXn+R/mLBCy6ZQn3eoyiFOcD9ldYDtYjP+zf1QQr0hGOQTohlSzzTDPL+n

/oKv+PZlELNUO/0Admlm/Q/Q3SYAjUmQgq7w7bZR86
A4zRuXWCG3hVVCs8gXIwr5LTlR3VcDCKh6CSgLYrH4+Y8dFKGj304VSQsQb5ozkuZtEazgBWerMV2+Z0

gmETioDIbFVQxja+PWKGh5C5dQGDuknGGgz24ywGxoQR02cCJwbLGNnMzRQkt3v/yhfjBAl+HZmKduDq

L++pNop8BEe3hKFNfmh5bK0GAXFzWIVYctQ7/xk7N3
sfE2Pg3N0rjl3ww8zu7IC4vpFd4X4eudYGohH2vWOzKKmp6l2xqnwdy0aiismeuWYMETElvgRQVodtDf

Swa+1H6htU9+wboJ2qJavruF/b73+5Exs4Ynud6JDjGmqi82CbpiqfHMy7LjF7Bhom0LdXK1MwQUaosT

U6VmupqF3lHWXk0m8PU2bVxGA56BifLVqQVs8ib74Y
oQGH9M0sW0alEV1WshelXLODst0mnLveqEgQT8gddQ8fX4fJ2XxpXzoL7iav3ylluSkCozJi/PwZT3hC

5JsCcEfO635NXeC1xeWwr5lNy5tA8kbJxt6xy+P3UI2Iv7G+1SSPOAnCBI1LvZYcdDW3MvZYvGKZzhpR

ctUk4s+kfZ8w4WlgKiKG4saFCKEnXx9ryUTR6zupFX
H+AY1OyZH1nLvXPmMrhHLAhHNGcu//5dUKIvhg3trT98Sw2fcXS7LmIjw/CgqEJZv4bNBQ55RGmQSNBF

Rg44XwY+myanumOLvmAfBNU1afrSsP+aZMpTx9apikFoq6nOCc6cbTyaXfmAx1IQBithlexHGkbRfi2W

1C5c1hj+CNwH1LXAQJq5JGnOpttcVqpd00kamdGvv+
sey89XGBwoW5nDM6EY/ZBXhdaAo9LK9nfFGMJ19hZb0jy27fn/6Rl24mp2kU4ecBulCx50owP77FV+W/

FYBgm1mQ8lXi3bDhO3JKJqb+O6T/GgWEopgfDl3x7wRlZ3y3KJi1l0Njz8SUnG4Dube1nqZwtyCh47AA

OYu86tBpzvpTmtxAk45fbGFb0wTRjz7wnEeYQpRr0g
FhTaXCS0cUc2iGY+Xn/6yQ68wAdmOwasEWOpeV4A4WtDdUw9AxBgiZrT1zRZU/KCFad7OWkOYntnP0RS

1HxWMNBW/3QnAv6IhHbiRM2/ERg52g7QCvP/2FYeRqSuJz5Cba/e0+IMTkEqINPFmvbhWMQPkXhLP1v0

JI8nL4NDwACAY0kTV6o96MyQW25G00kQp2H5D6ZxM4
UpBbk7kjjcE1gjx1vsczQXNtV0oqCdGRfTxRpeK+YIQ+FiGk0eGa7+XJaUZV9MXhuULl6tAng7qJr4nE

JN+xXkB8C5/dvDM9GTJaiBM7RlUvZNbY7on746mb79BIqIz6YKpCGq7t1tvomiWWfuXeg3VCCAEilAoc

19QckkDzew9Z0Vfy1byCdlA2ZXBIqhsyLNMBkmZsGr
UHK2gVsE5hWe6qSABx31a9Tzg3inFY4lBNR1cL9EtJADkqMjuZgmolHjc698/FL0Xqb2TNSdW0LRFCvz

dPsQeEShi8v6qzbHVUKGN+qAKGfX7+hlShzeU5Wanl9TszWA/z+K3arudxkqFwdCP40zm1+nGJKbe8VB

Di61oPQyWKUmJgS6Gp9M4e5bnXY3nODI9w8QAWL3RQ
+YOh6OhlzpQPqbOw7sAFSgD0OmBF8GgiN0Bx//+d/Qpuve3b17RJVchlv1MkdbcajB2ceUw0iHxeBzzA

/4sMp4N+cngocq4WWdHC90n55Sihbu0OFdaNzgeBB1v/mills/OhYSrGPx38j6T7C037ZkTCcRfyZsjB0du

Nqh7lvAdATuL9z5/NEueXzQFQW99DvJ1u44OiHB7c8
tQeCA1N9TsxGvRkgBlJ2n+37JS9egOvS+L6beJsFraZ3USLmozrhCrDOYr7kfwy4JkmO9W7NFsbtkwVs

4gciwmGL3tVXNTdL0pX/f7ecK9DCyb2V2b8ZUhnl+3vhjWfUVB8yBcn38fKQBPEaVbs5micAoJ6ND5X9

P2MUdQQ8wLUg502CSrfFYrwDctzh8GxqtjJURlq/7O
yZwq4tJxDTrXm9olN/LWobLz5o+m3nB72uk/ltZSv7RzVf1YG3TE45nFhT2skWmHQ5Y62cpgYHbsz5E4

6rC8jzrwurHKF8gHFhCt56p3LNeknc6JG2yh/IVlSAMjtlkXpbFTEExbL8YKHhVb5DlVEz1q5n+wwgvS

UXwiA/y006soZDNRu4y5honpsC1BB2dxHXMsF9MoRk
AXqCxT2ooFFvD7Z8Y8Rr/uQMBKxHJ5X8HolEeztFIkarClog1rRlKxVyPZWQUdNBb7X1xpQOCtG8m65B

MQ7l5buj2kFQ9xelAle749nFcEf9MV/53AOFnfd/DLe+HZIlK/hhpIWkdWv6IzPiQ001nLTbciXFi9iP

6Vsqz43jctzx42MEqnzswlkVYwUFw+7FTMvgOpWqte
ieGp0++4z1/Beep6wcGuHK4GvDXQDCbZ7Dvyo1Zx/Fmw7e7F2mpfMjX8P/dEW2BVweI/6K/Luf51t82M

0tvjTWkoYGaAb9P6i/d7zITIJVOUzFDHmu2zJdrUD37hIFAecJrDilQXh97mpVwX3GqIfD+23lqaB9T+

PMe8qCY6n9RXcwGFpXpnr1Fb4Q9Fm0reOqpp5loqz5
JIebDyUri6Nd4CWZIx/DK4Tt7nH1094gl1K//HI1fmIs9etuUgtLCiDsaJBLSU9yiDj6hezMq4V8qOOx

5DVg//nmI3hm6W4VjFbunZaQRFLx0eezrCeS1o8P+N4k/6ylyWlT//wWaq3iq2530Jm8H0Mdv0xu5mjk

HJy4vJQptwe1DRhzrete1f1AMGhJFTVRNniTaqk3Xg
AiEE5sTmt0chKX/gEAaLK7/hDi24amDAvVcNwTtCMIL+7XvMEdlnzJrY35ncK9mFXIr3HTVDZPYM2GJQ

mDat3rPLx42ALFo2ZAZfb/N+ZKO5uR40nEDbDFxUf6VtLspTGt/eJOCtsyDhCzAG94/Nsyux7YeC5RYH

UcuYbvwCuV+/gLcyDfGqnJfFzJwJBZ2Z8oqX4hDpUt
LfoDhQvMosh8uv+ZdrsKL95/HGiJhb6VWma6qkA+kKt2Sy7fUj71/IHPM62xSd09VwS8dyr45cXoef7V

GqYG2cd3jrfdUJ4wxBc4HzGFkXF8kreVgxa7ib4KgdIokPdpuYKoN+m8J6+/c62AOvAQc50gkmZajFeO

9m/N+yydlI8aU5bIMOzUgO8dVASh/3Ea+uk/noZCmC
kPPj7MGbcs9u03LYIIFruy/wmosdVuU8HyIaa6aPTp4yqVUjwOvsdctUfw8E0CIYBR6xT0Kkt7Qmi+R4

lweOyc/VJHCUWfxYE8J68xkJbXpodFbeXg43eNyOq1SZFCJVXJFGNqK5t3ncZ4ZZW7u1hL9tEKrDgzBq

Q7GY68O0uz2jhIv9qBPo78nK9691ImqZ5fy2Xc2hOK
01H3Qv3PGopJolYVi2VgaXtAfgn9tchP8G+jntQvUcwfQE6mw0E4NqJotwmY69ndf6O1qr3Qw+5w2yXT

VdjfGnz7kWCBEpCaCRAN8FFh+WRJ0C5q+2kI3PI74JMHlSAUzGo03UhkAK3YFWIaVk95mIv/zBJsbzp+

L0BSpVs0cR/bFyHKM87zo/NTKId6rkpFzQjZ2scfOb
hj/Pa2FYhGK1ay2facjaJc5uentqMeKNPz1w0td/IbOs7p0txlVcOmDiFnAWpGZsnmt1uZA00jzjVuKX

IWcLZQwQPrx8gi4Vr39T+srFFmMAE2zwYl/iCDv1uMie0aM+t6vkrd1TGl+4Hs0rGAQ+vT5TK4J77m7b

bSoY5p6ZhHyZv8uA0gp01LjMfaedvceSgNiR9fjzZy
NxfbuL+/bgJ5lDdCRq61UX4PONAYUUtGmZHaMVR3kZdH/UCbdsmxh6lagxxVJE7wU6TScY0sLnUdJbM8

ai1+zmm36r1mupk4JhIZ4hlNB3EWZhtBa5yKaN+MjWs/rksb3e8GpLIMmlFAjKOGSnjDHf6qpWA6wiA/

AT8+/F7BkCxnKMmemOmsfuPQFpEGYvsTz94LL6XuiZ
6vzJgflBwVBaahHh0tnJDZTu9unUmrHWB398Lr0nq2yrB9GlVxI1cjv8z9I4A7A1t3aXLK3tjj1Yr073

wqmMp9WtkXFdJfVD6vclN2a1Rpg7icvTk7ndhWtWf8QmlLTuYFA55VXQcsaGrgBPtUFwpwfvmUXieCjL

hR9xvY/fDgZzQW7xClvcmtz2w7RvTMg9cDf55251xM
J/El2aQ3eSbe16lfs/SGTq/WdcLPc5Kn/yo0LaUdxH+7SpOy9diEkVRi7nJ8KTK5kpJNsCjjQSJ/Wglb

z4R8DiUQFtJLKAHRNVIH3ea3PdaM0OYg019weLxCUmDqZB+fdXEdvgCevRbkEquEw5DBCTRg7JhBt0d2

cSubLf9ZIbyXLi/0WhGSOr72GH+bdFllVwK8ofYyu5
OMPoO9f046CUjWYUrLGVYYB6x4SFl0Dz7xmLzrenQZZf++TEsQ0Jv6r+Rgby0xL8fMs4Ti9TtZI81/gJ

qf0NDy3cPHddy2F8CIoyVE/iC22wNkP/DCrmONshhddP7IzT9QzXkN36jjoNcpJmbxxYQ+C2TFj2qSw5

fwDyAGQVIO+uF+id3ctPBo3QHyTAsOFHxk57nj43kD
BkL/4xMxTFDDdcBV21AB0PbbBlrB/vLpX099+t3iPrJHLIHH5J2A8u+FxS9uOMBi2OLk704DkneABvc4

ONhWFeg+Eb+eoMHHhwlkw6fPj4GGNpsU+idZfYsdygIctqb+zl8l6mbNPD6nXw1c4t1RLydO0XU7l/ZI

3psy6U2xaHs84gxX0j4RBusrrcI/qOfhjd5VOZXJp9
fDxjyv233iTkAaabsBdE8DuTgb8Fbtnv6i97Bj5VESS1RYLQZRUd9ZC5s1NRu5nj+AHiFs/XSARJqm2F

hfmqC+0N7/JXxo0/yVrW/dq7yaUmvQwxP5efjGHGToays/crknSbJYDE8OTwAD7f2dqweClsFrAgJWMW

+gtnaDMyB5D5vhRqaPB/PAxAUDrKM3uw/ADbfZmqSj
Gb9+lb5wA0IqAf8wgD9K/VVBe4+VKXRspVHql5/IDoogqcdjaRNCbVkulDH0mcGcCfuzPBVaX2V6noro

aQg8OV6i6Bd7xr4BYqOeJjuicLy2jQ5wFh18KteNzxU0VzRw6lVpzmSUe39I0ywf4srweFs8iDNPJ6qj

1UZjk0NOcIJ+/nC2XRdHU3iPq2T9Qt+Tqh8Zyp18IW
/Z6HJsQx7+CZUJp6s3uq5XcnqHIhDQ7b+Gjl2A6ZNcaT3IpBlw19+LFCuhn6o7b8Kr2DzOVThG2I6oZ5

66Ap+xVw5lApfeh5JG1eI2XOhUPo4YISf/DeQOYilNuXEu5KGkEbgypn6me0lASMI+dr4dgGBzbm5WP8

Oh5a1sufyf4D1c+SgqsgKTeJ+kMsHN0kYpj7J+FgjE
kNN20P5kIzWUmZM5/p1CUq6k32zXePMYbHSHy76aNf5N8Cscfv6pA1ceWk2LF6mjwKtzQ0yLVO8ZSWAv

Nqx8TcChXG0Eg7jp6wYLpuPPaNsDYqHUrAFkgzNa5d9IhoPMtQ+QJQ7m78Trxid2rRgypLtL2e1ewFoi

CfYlIWWagOo24XLZ/IAxcTzvFF+YkL8qiieWrb9HD3
odGmAPUx+5MT12pVlN7GcsvT988BNKAso+6ZL5YG7YFNXAbXipsUOL9wBywdrlKnQRIIP/GURt1q8SOw

OlY68du/chUb2O05BosQYtdGumze0Dtbh7c7REQAP9WMpt1lUkCYYq74Fpc9CZgHj0eiqvADaqT2C3/Q

CrdPqnBt6hPB/sfANCCFA5SdugC/Q0h64xecHqu+uG
ptDP2sn1bRNCbATRyafnzx6qhgOyRkuNrvsOxeKQS9sTvQM28eNxZ9N+qYB3gP5A7UnU9fKIhpdmaMWc

/qDlmOOXMo5mFEOBatKGfbpuuClWsBmbwWbpOhvXygA5j9KIG8RWxN/dBptjudhw53BkuK1ui+McqyDl

X2Ksgt62UTrBmXB9Az54/cLpYliy5OVcBiT1r6Ag+D
VcviAIypDdD0MeQbwzeqET19zv7TW1jauut82KhVQIgoYQqkAqpm5E5eon+fiuo/hyMDONU1GMkmQj15

Zv9ohfYIBFDj+fQjj2aa2Eh1dhH3wH98kH/odkdUnJ5OFPJEl3niQxnYURSvSU51xZW/wh4Jwrxs37Ic

A3XigzNsEArTCakHH8GjURLUN2a4tDMXQ+mghWsPW4
Q2688rliTTFiw2Tlv6+cYqHHme+Tq4KoKfx5jxeJk2yh+RBwgAk1yX4VvpgTkEelOLtJPgGUmqA0kKpb

BamvHTOv8UuMz3nidGLFFPAv93DWzr0rsjXksjXQPVCqrJOvEoC4CWm4Sde2QjnRLLraY8Jgkw419nK7

f4dgeqFFQJfE8MiZsPil7YPCoP2MmWlrF5I2OodMHS
0Gzsyc0j8FePanAwuYcqDVQEB36p3XRs7jAnO2pB+PGIqVIj+Ab9VgZIMTs8SG1+b0rF9QWfdaM1qlpj

EPyY582IdmJY1Ga/sB/nR9/cIbozjvSWWQAjCTX6pyNX2l9NNmhoeTOQZsd/2Xb8RIldAVLkbN3bNDzW

lJ6s9KL945l0Ri9iNd4c+1xyAOwVJnQk1v+HOi0wCd
Wy38DKofVtcXvtNNHJ9cFRfI4iufb3Zh94UTEYL7abCaVGeE2uF92fDVLuwTOkJ3agUHjrXKsW7kWWfE

V8vzNZ43x8AOYf8iyYhQpd8RsGpwq63fOgQPvdEiv7ffS6Uz8nlAUdrCKvcbWe9hTHOam2HAebPEmPZT

8ogkKbKWiOQcxZCkteOsjJRzpRPyNPsPGp9iXbBukl
8SSlQ3e+9Yo9Udsb1QuBwf3LVIDDQ9vbyf6jsfGIBkTdM43HL+etmu8EpvTNlnGVG04JqgyYTMPsLM13

Fl9XfUC0LrzwYKTMTY/lLr3FkMx14B0pcn241ErO69gbYtMy9G/S5QW+0hDKiswA1XH9+9yPAacc9eyv

YjzZu5vxdq+yWDvM4SG42nlKLIRu1q8JtUj7LmD5O8
v+s8vQZ8F5n4+Lzugo+3ROFakVnTPV1MeyFdB92nKRdbpQn7aJ5N+m9rysyb/eQKSTTKbKakY+VV5qGm

pH0XLddEuJJq81Bv/R9YY0LJ4iVfeHtQmjBMydgWADi6bqm7WPtrs0sjTmrLNreXPBZv+nGMrAcrY8jt

RuU1Rh5dgK7eVnKBamR7jn/8InfwObS0zliRv+A+Yz
f/CphImO+KmPyD9U66OvoNFimnR0GOt6XJpalC+qARV24nvTm3fG5E6eAwtvS+mCb+ARjGWdWNXehigP

n5CNTMGlsgkS2A7h0J0FJ/6UKXvwxoSim9AhiuFNwd+BrC73F6LBaqTpJMT7rTM2EN8DQPhinRZTBgoq

u0gpKSj/MQ+YHSBFlR+xzmM9PkjASqh5oduiPimNLF
j4j8HgnURcEhKdP39Q3ds1+VrPHd8rh9MFfGu6DiLh1nqKoJtYdqJL3qWBX8F7xG6d5t55gA5RzsbUm+

tyuQA16vgpO35ks/3Z/H/92ylc01SUmU7wZk1ECQPSQNS22oT54tLop/pYb64SlIIX+yZLod6t4W8U23

JeZXQA1+vWGZrc/rdY/3nRhCs6IYfVcoKBNeHHle0I
L82Nlool2FiYg3K0JE3phuF/GinYGKD90pjvMUtnsE/7oM7yw7ot70VwdTA/Rqd16q7iev3ZydAinxcJ

ijPexOgFwNQycTgqO9Jmy/QwTQzw/y1c2+UZAJZEXbkVY5Xg31S1AQQPmuNbzJAc5oVz6bfgJFPhgQVj

pJjNtNIjEc1F23TEoqFYO5fiyH+T0QdKZjXh8wknvj
XN03Wq9XP21n8qcRfAHnSySPAl08bTC8hUKVcAc3Ya5cGswfW++hRbZteV9it2EpZExO4bvC3AvQ2maC

Taplj1sN27smzic40IsEt/BS+qkTPV26Tc3nhIfEnqMDboLzOGR8cMVeqq8GPMGPQjUiITUE3qlZGQ+0

F99W7N/q38cX5T5m6YjIx6r9PqbkVOjQQ7Kl1w5SsM
trYHynIe+k1AaqVszPHCIzoRXXO+wwe7yRezpgoG470DPrQcJf7yr6dmEuvAD51wV58uvFZMNYdelNpK

BPyL4SdQ543qrECtuYgvz+k7apSLAiQ1ZKh6a6VNaXlvQmqhsHPtKEN/tiVdEDz9bcgrYAHx0SpCC2n3

UMx2c6EoxLbh+7e+6M8a1QtQpuXTgGxkvNyZAcfAeG
r4EVnvPkp2b/aS2+d81/+rPq6ZOXuhgO4pRrhsdW3eWF1DD7hwmkkW0MKrl4KO1Aj0zONmk29peqHrWP

JFQLkmEL0LwUBR113Qqsq7KBxBK8Q9h5O6lNMMtAKCgJrFh+IkCI7jqsGiUTS50nvxbHmJ52xKwchYqZ

P2kRLTsgTnxNIGJa+XTeu+ZOqzKzroVsv6wFG4dVga
hTUfOxx1UMbrLyG7Z9vCzdCqjbOK10/ZgKUOp7wBMBl/TlrlBJkaRHbrtDkCXDK6lY0k+Ikc6H+Pt7u3

scZgHMFTjnMT3YemK78kWcbyOqPY9suJD6YrGHeTe51t1z5kq4LiCEVcbi0wGo1IwYJCHuyfflMdkQS3

cFdSDprCz1gKaiffhPirayjy5gQ2H09KNTkJdchHIw
g0jnGlBv1a07BaxNWetlNfFNOxwFxONH6o/DH2Tq0d5LKBBCng7mTvkCdcYKu0h59G7YSSO4IzZQP92q

muIdGhdtbV08yqTBOPMN2WgGvgYsPKXChxXAWeKO5D7bdb2KKQIs1mrXsg3DpKurp3nPeatkuK+vyr4J

+P9glLog1dM0zACItL5XDLAMoTc6g0mJtbGbBPr4ye
dgvgL0+pvQmKOonzKK34ZORodsGbm8fwV16WDReXLt7Y/WJf7YfPU8Z/0uwR4Vx4myBJBVAJZ+KVCNlc

vuE40woGSkHyNXhjKdKwHb23l2j7zRObFDRKDFFybH8tqPrWDZYtUhS/efq9HTTa+T1+2rTtDEJhxOEL

APZjer2cNqpNtMbhxBtK/jxKzMmnoG0bc6MLOV3ujp
hDK2IfJNbbzP8FEqcvG8P6Xllf9lKxJ1ZYg3mykT93fcgvuknS+XAah/h8Dxj5+lco4gGRWiOCRQNiON

vCCX02ULGWL8g5gVujRmsKG3Hka4N+SH4uwyv36855aGpFSSEo9dCvmG91HvhHxIMmoFDVCmGmayYrKr

e/qIMec1X2mFq+ogU4mvAOgznfrsSoFBFGMx+EyMh3
rTq2JT42L7/BFTp2foV8u/1nd/c9hSRX57+lL/jBkFUZtEsbuOZOTWRn9gp9mMarFkkoay5he6mrkJAq

BPMFc7wlIP3OIvCKBhe39bU7RIxZPC/GMGf/k+P04aHJq1uJfGdWpc/DPbvbikWc1wF1KokJgjMEbGt+

2NiEBLJSis+Fv7zUPFndsTth597KuJ9BaEy0RfGxy0
zWE+8anU+pz0r6y1/abOzbgkJU3McO//ZOtax4oPe6sBfD1vyqbxvgje+plRk2ssv4Djekf1DsCdtGR0

n6j/2XLLPEv+egZrS235Ot9z44CEHoZoWthrZ/T7qQ5alfi0ZIsAVJ7/X/l6KHGvc8jhRmDWeJ307VlG

/mRUWQQQu8PwhT51xzCgOzlvjuOdfwFYW2s10Tmrw+
Z8UPQkSh5RJv93l2G1x2uqDG1NgYJ5ltkEaahD07u0vglDsjKT/DYHartTWZzf2PiAOJL8W9wUtPWH5p

RQ4+Qnc3XBf/qUb1W++63TOqwXz7x0DOqEPiFjkrx9iDyeNpqxhEtywCW9Cq/CNQ3Vj+lR0qxA8e2W59

nUi+zUAUel/GqmRS8AHBBT/LkWz6HnmdifZa8htNAO
HdjVFl1K+OuPtnSejVqwrbGMSv2RGDXpdMh0gqZ72y3cMD3XjyZ6NdG6x4dtTnVvXvxZPvz7mriP9vph

qCyBXXl07iwtSxsc8gEqoNiYPvkAwUrtRV4GM3A+0f29kFCAEFWahdLye2rjLg+oYVvyjvX7/gtYY0k3

zAVQffgvVPXx8RSacPePfREGM8C7Ipe8jPwqVQoTzj
d6afchpAhbMbFA7gJQese3gtqVHS3I11SFIKKNSoX32Pc2nhH2zNiY035mLIxT42YQtnbCUtNiNpAUP0

VxvrEwvHQ4Bgmxh211GE+QM8btaJpXZkMJPq2C057FX5Nl4lTN5N/1XcwWnfyafZ51HNj+oYVzqQRTM3

DdE2bVq25i/bOiT9chGlOjae/0ga4A2BOUWb79fegZ
06NqWz8utAcHsGJki8z4amZin/875k3n8PGGOtoL+49pMaOQXfUE58huAqZIYGCL0itgyrF2kw2OdR1E

zaCgBOrTDMCTPv60hAK5URta/SSSaBMTaBk4LalPaAz7BOxAxul9KhI9sxw5Xzseh8uYqDQe8QnkErQf

c3TiRq4cGznJI82BqEBOsk5yT+WEd1UpktrbIlxKgM
hAxqC5IuQz1dSywQ9MLRl7gofawHxP6IIIFH4Kb8Iv7Wo7TE2fETuvAoMR2lB1nTQH+Cr0zflLnb7tsa

A5vqNYbTjb+IyPEibyfLlyN01XAvPpip6Fnvu8XMoJfhpNv2f+0kgv+1zg5dwbH83hT2p8hN/NJkfKJQ

iRd4Y/bCA4JZ/iO0Tq0/FjxQ6K5rzBqa7M2MMMFbl9
tB9VfCnaDWkuag/5UmPpoQrDN4MuoxhNym1MuN8MtqDMvuy+LMq0Hwvby0qmui6zfRDLwSN1qD8BLo4B

+m7Gq/sSFCAqpVm0NXrBz9q6iPaPwTlZ4MfZqG5+MVvgMOnwL8LUuULWRWDElQ7XBcDvi8W4BoZyALnm

c1FQ/mzuyjSmzc+SjlWldeyCxGnPYBcOj6qf0Gji8i
jFcXUElUa8OfsjLpo0zJtNNxug44E7wijn8bYD0P/cr6mFEx/2yz5+Cr9rgh1bAE2Gbpop8yrUGjET6u

pyi/YHZX8gNHX4yjlB/xpLqGPcFsUyEmAayNPnSOAdgldAgreawJ6BSvITWoue9/nrrigTIDXg12KNaf

H7GiziC2FF4XyAZGhJULplTWX6ucaNGMfapnqQoQkb
6JroOcTlnppJthjKEUREqfNvfXPrfiY5v7xNhOf6nTH4+HE1ADjejcf6tL9q5/6r+nK1kSB8AU4SonDZ

eyTXwfseZQAdsgTv+yECEextOsxqL4XgqwtoQvKoV94NI44teKP+fceV3JPmmz5KDpsELif+IIhRFXMg

aTZI/UZYq3Xk1mmC6X0ol5vQRi6OgcQdkvUca3W5Fh
GxDKHkFsgpoAsa2bQ3R1+rIFHUdhQ9G0busGsk4ShNHuWTrywxhFlgviSMqHR8s8d4b9Ubl+ZbbPR3ow

PjHPnTnhgBnbBKeyUr4p4e/vKHYAzY9g04/VRSjK0vN1ZexpbVH+5wpHPzLutAdeMVqw0rSOzoUhvyG6

XEjly5oM5oSv8c0SzEYMXpmQW3aHtKJtC2azzkXyn3
zVJn9DxXZtaeQw9gqe7vEp4H7Ts5FElS0EDtCsmqjsgho1vsR87Wx8oppi6LopwXuY1VT+/Pc5nih3Vq

7UrynTQ2ZErNQEfBUEe5Lwz9gfjg6rlBKcitAtGmCsfN2MnEi+sLg4hnfFYopQPOsQwpd476OFL0nHIP

PI7PgZ2zZ5VRypewcpyjDTSkxp81SL7l1f6lbFy267
3Qj/VQMC+zez7eVCtmypR7fXrqXMBxV2yBmh7zUn6FrCx/yKhtDLopwwq2AZiOpXe5FfIrJQHGm+kCAv

8ObVJcaYEnkw76moQaS0bwwtaySMO2QqTZtZD0pLi1boOM42bXjLpcxzLGRRR2Cl9T+L6GVEpaWZwmOh

PP32jPQ1MW3J/mRlcoNl61JFydrvsUVytlrfcT2MiG
pWTtmtEI6Y147zOgm8MjfpZ4rEdPHbw6JBLwBn6fX7k7PRZPqZM3IahWBx/eLxVN1mMG5zWXwlo44Bvr

ysCc8qNA5W1HsuKmnEdQkZR5dc2r/Rw24JysONj82HZdc3Jmzno799w5UXNHuzmMnk3erdvWH2Lknv9X

7sLE3EF3pUzgip6hZdLP8ADSFB4LF+oITimXnx2+dg
/jpPyscxSy3rYTJtyTIJFdpzSV3no66P8cePRyjP3i349jPTn82/0z/Z8MKQrHw05nfC9S7Lf4lxMAOF

s7QgnaikUBoxM2A6E0Hd7jCWNkoo794qy9+yOJwVF1lS2wcyOZA2LZrH0IJr1nr8ZZv8ugtubX45hMgn

ZvkdbidW0s9ui5mdKd7bNEGOp+GokqlBaHEVfYUH5Q
2K7l0uyWvyOvm1jjLKwj8xYdjlgz4r2T5bwkhdisy1mn6LdQyWTZ1bT1EFBwc6Wnrd9wUwCk8rz72j4/

S5fws4pA5sibZknokcAjbcIRjigahsehffWEh1kR/NFKUvm1M2sKaw7DQrmRPEqVx9b+Po+ywDcM1EgO

CwfepVTv9oKRqFEUci2d7RGCGtqX2WlCG5T3fiH4i7
stTykeMn+zHCJNMKlVtFuc5Dy51LBSvjHonOmLO7RLtxzDgLWKqcYti1kiJS8DVf8ZPzQsK94u9iKQbQ

QtJ70ZmDC+zKctdqVXPW6660509PCr4XqPZKDnJo9eluFFMLGhpoyyCqyhIqx2sR6/6apsZ9T+Qd9wcU

I+CbkfzOzhkmNh4K+k6PlWPZegTst9JHgF6HDezlW1
TqwOBKDPaAw0DDZpldL4AYDV72or4XDmr3U9f5PotruF4JRZxGSFndR7ToNNXmM4+bhpPMXl6ixuqKZZ

7xkJ54DtHx6geiUkgaMVyHzesjrPVp1mBSznz3TiY24nvz1DTIekTHgjOJ8RwedxhZYS5/6A32MHK3Jw

JBVHTlJHxdoBoi2IitYadMuGkWaZZzPd7OhwMRLzVh
Y5SpY+t7u34ROFXBv8ZjB+DZ6wjdGuM+CsOaPUJ/YRQurQdCGtrt+ykkU9K0W4W06BKDXNb5ruSWU3tw

Kzry/3IP5pqX4GfkqCoih30CK0QNNN0v4NatStArSjEtnY+lV9686avoOgnYNPawReDsdD8MzwoA1rOu

tuoKLpGOfNuEG9Pp5uXyDOJdiFOpIcHKkPTruE97cJ
fhPisHQy1AVrI744J4DiSrAsvsAVX6t+ty09q4ipLNkrBI/2fuDs+y8eIFFVtG+iHPWAc18ZCTV226oK

RXiRDpNJoz0oy3kx0jEqvPJAR5QbH6/cJRyN0lwQTjcYhAqYHk9LU4TyFytRYazkIVJRcf7hI/+labTn

rssZgege1Xiy0hdqH4MYgBLw2EAWXHRObU/ULyUWfW
KR/RbF3m1IVlVf9SxlKRWkBqLZg4kmMQ1C34GJBMLgNbjEPZy4jXelNvxq6bO/3nB3IE9Eeg9Z+fSdoG

Oe+NUmNbveaFdJR6u5hp2UPK4Cl5ytwgmqne3+b9TfLZEWMHtDFkvqW4d5o861FFu5gCqD0+Zdessg6P

MBy5B0UaEwZCDEX4A53OHNWObWEZQMo0Tlo7T/kjS3
bcLQgvDJ+8B49yUQclQ5dDhaOV64vP6W5DNSMoVzGKxMLKflkyHi2fEe9f5i1Z5Arfj15j9kBz+9g4Ki

8ecJWNbyENL8GsFKWLlnDW5HVTy8NuCdQkEXIJFZalWStDWokcYy4U/X785814eMUAClf/3oudxJfo8u

a/2Knp34KkUKkTWEkfNjwynDOpibPBUIoIUlRNNUdd
KyFJYf8Cm0Fa1/HM+PjUsRfOuB56dz8ONacymqXekZGnUr0+AVfQjZFJyTYALGYQdQJc6zFJ6x6kQn29

xnwEFn64ibn/x0dRCjmYKHF02MU0CEHgtLaaLVqSVGvHL1F6ftoKihZBggqTUHPU4W/jDnGys5VoX8wZ

J205bW5OnbdCLwQTLuw1vfdr7mDsMEbeRRlkrnbaVX
y6HsWjuLya61HfMfYYw71K4rmch08leT+SYnxD5iPMHZ0+NSqbSFksdPZL9/s66QWWuQ8xv9IyfsUAwG

RpdnGz9dqmblP/fqTyVnY7FRAVnIO4b2wGnaRzbTl9qfWf4Y3Gm3rtHX/ZGqCEI7i1mmo+3lhGDISOmc

vmdlGNiF0V6zLK+dzTuBn61lz/zoKHsZGYFEcN2Tcf
OZoR0d/6KO+EBR7qGVWD53xgblUVncv38MKo+Ye4yceatL5xx7YfFW1yTPDoBDLAYCoxKjMbBrIXT7jo

VydAi+cSwBUAUvovL9mr5r7hqDYYmgLe2etpob4h2zutuAKkwBgZHHvmkDfjGx0kwx8UayEwEoj483C8

cMv+SyM6kombKx/ceRny3eMN+y0SbebPsHXDIgwTLk
flSoFmwP+a1eJAquPp1jvAqthxZh10SQvlIhckzzuzxo1EaMmDPSYnvD0vdOvXh0ReDE56Y3+RUifyEz

N8/S8w730272vEyKmc8bIZ+TY1hO9gxRMDbf41RyMQfb/bp4XsRnPSCBzzZKoAtpJYTHtfF1zHPJz+Xt

4Hp1CQKcfsMlxgTo37CTK+5oPkTSjsuoo5YhYDiyV6
cc1LRix/YudXSJOVG5LD3GNzLTF5XZFWQhz5tQPhrF66rqoBegRbq9mAJ+oTpxvDajd+tzBzzkkhHlpU

xs4eRhsmDXA3+Q2I8ty5ul6TPRaTy5bsoZx9S5YOu23FC8IsNPyvh0M6GRs24CqN3iyZudze0ph48/t6

BW7w5cN4jU+N8Fn9LautRDemcXE0IpK97jxs883/vH
A1kcuyU6xbrRopw/s6VwxLqK/8nHVMZCe6pVOinLwOrlAyY1UsvrRJAn0XddEFTVwF/c30rLFafMo36q

q+KRr+1akbrpFXhGBd+tgCtwZfoQEtzi+rspbN/opsp9HwnMDZRYx+RwBxq1dBXn8jwAO8VpRnP+pWDb

e5GzAPCoATt7O6mVeOMHuDhkcABMULC2sq6g7IWB69
NdvFv9c8s6WvBhIbDrgOqwvyvL99YMV8rpe3m4SkHSyj4Dmt5kl8j3dxeMDGzC/VXXk3NcDPxfzyz0cu

JI3IHOnCaHYAeI2gUtvc8+aiWOMb/yIz1QpVFJafl5vUAemLe3TCCCJdmI65tVuvGOS0DQRfZex47zVv

Hx13IAXDD455qGcMOecTTuDuEd3nebhUWl5VOjYOcd
fzshOKHLeZfC7kEQvNs2/jJ/tw4c2l4uwFxT1eMQF7fE33OvTp5Bs6JWAGNoFTyFQmi1CAhQxgTc65xj

9PkO84f5zwWRs2hkfRWlw6z4IBrx1XraCLk5LaOK2apmBUtMutioI76ja6ohJ0DbYKLTfjv7hEPeRmjD

Azdr92Ln/bGxi9BZcBWz9a7+xanso77EtP7Rfhkodw
4so2ku2P6LkGRbAOesCCjk49c0JVvGE1OfHc6CinzYAxduTCy7kehUQRPp79f1dmFoGvzmzllysfXEgH

ndLtZ49HSW5c2CaTHFTJCnZsLDGt+Aff2bIznrxAiFcGZcMEmestr0x3R85mMN+oayA4Ix087IS5cOTx

Ol7cXoWTfNVIM8j09qpsLlNA4tIfwegel9ZzSnIsPt
L2/aEzNmB83bKOW1TmK43hih1FJowlGT/+4thQMQnZ5BhSDxnzD5QLwA6AM2GFDDlltysjE0XLX8dpoi

dXOgnOfUDrK0WTJyVVoK9at5W1cZePjy5MukRSxPopa9Wm22Or4x8FEhBTPTiotywstNTwrdAPP23+nr

0kfL3YYyqkBvL7MvQo5yBpHhNTsjQMT0OmyvbEC1Wg
c655yYkqwA1Pe93yzJY/6wXG5aupQAdAqAKYm2J9apSn97PgEYA40c1QRYtd528ircZ49+qe5SUFmAet

dmXdCRiKZUJReSRnT3iBEMVTbIIn98KIG1ugE6wyHkWdDAT1tttX2mzeKqZJRnnpvAXw49U4l2P6RwXP

z2Nc2i6o/G/UKkdNwau+wwIS79ylMV4PReUmBm7o6a
nTHKPh+uX/Dfi0zvQS9DHDT7qfGYGLz78oNAiM4CBMvMUva71fNzOeZipaNhse6BeQL5Qw0sa0BwdmyC

f0hRkN+FzoVTM4bRWCUw5e/idTjp9QOQNbYpED9KMYTgNsikMAatWc1tR2/42vN0mkPaQin0pKjnFths

HzFoKVdjL6UAdG05MvOzBgheBbjHashYNHisajNK7a
tswWJen/zSpdMuhCgTq6JZmOoYCuauhplYHZnYN/66Ma4aWx0vexM1J+wXxI9jn2lr5JZg6PDnNUfj7g

Eux6uS+BATcnRlr8XRMrym8TKcATwzthpzYW8r4Se+iMA6NuXp+tpT65u7wpzWgV+6AIS7VytlW7ydAY

1+sfhdKGYKRzTAA874cYZIh3mDpS/ZrKf9JdVBbt7i
SjfZtlGpqyKXW7pfiKgsKZeSikxW8bf79125P7ikxLOxBSndI38hXK6aIqEnlW1jUYhV6hzAXzM5nu//

Wo05FsncibOT8dH0qd0PEgAE9DdSEiAze3SL9GPs865Rfa/3sMreNxMredaDmmcQElLOof5itVlTJW68

bMwXH5AuxnlQOjkfwK25rjt7F/FPHmgf/+ZXqNXbzO
TMhhZAjeDcwpnzy11bslw3CFhir4o7+QBOInkafnRDY9iWFy3LD2RdlNSzwv2sZ2UZaMg90X5tOULYtH

UuRQgQBC0fFpLmHi4QhxmjrfT/y8lKWQuBe3m9U8j60LZqyA89zboO1eWOO2gbF/k1/GZpWVdRYW20/o

ZkmjWAmsb6xl4YpsOAjGz5wn1QpDC2YI3CMo3ditTL
CFXA9Mq16NMOyIl9eyB2+oVVIzJIseDlJEhOMCmnWsQ+cxaNB7laXJW7mlpBIGYq9rMN0VPZOMLowEvW

jMuL9titU1alVIoXromFSTpN/W01+iAXgfC1w3726fEjAqbZyf1tzhNSCJ503fBtryR3Mm06DsSq9AOL

7G+Af64Oe43dDYcK2xFwB6K0zjb23EmppiTwcg1KKI
2pP/t0/Wx6N3daRjlFyLKl8swcUTG7n/W0Osn1kz9N+WemhIfmPRRt3BpMYMJH3cNXvdcVFcyjmoteyz

Bvlh/mN+61j85gmSbDQA42d1W3yoYWp1Fq2/0WjZIf4w0IAm+8c4Z3pWOX2Qg0vtvi74MC9ZhZMLlY6e

Q3xDj/d4r4W0+5u1NfnyUT3K8fIkRVhU4UpEYKdaaF
GERJpnE88w6Iv+8nb/NubIBMzt6FHGNx+ViRTjTqKA/YbW9cItq6Ue9mP4ZOV/A4DEKD5/nL9dkwt5pj

hdKpizuJZ/txXCrp72FcCkN8ZUV6FV0yfXjWBm20Q+TAFoy+moQooJskFc39Z0rlzNen9qWOvS04YCeb

dsTOp6nb1HOexIwSR0yU3yYas4wezyoW+VaFcCpr5+
Mn/eQeUDtMx37FWr7oyc6Iqi/3StVoJOJnICu4GU7MXM0sEeW4DxLBB72/WsAQ8NI5UdyUSYEA+O6ppH

O5DYuHYhfT37xdCyYy398tTByOUEltUuaY6Tn5U0E0YEG+JGGPfusSmtHWWXnFSPsodLxjpELVc83uvw

cBQIgEfVKJWxCiS6o3zGpkCo2VV3moq7UyLOF4Xqao
A77eyT+XSokLPeTCCl/G4QsNn47x1HAYRjTE9yx6kiDDP9LS84m2vWE5e1dZFOFSWtpVYr4u+JvamBYt

Cj/EKKYsJjW8LjsStefweG0HYI3bsTro0ZsBJVhZCgm9RVCW83NQ2eF4i0aQ2Dg5n9RBkWbRFQ4xyoPc

Vh3vu3qxkN1A12GqQZhfUSx2g3Ni/OY1ymI7X9PF+0
S7y67xR4zZwDx7RLOjqSqMG2O7zrtA3mqFvxn7ak43ho8WFOl/CB7Q20QROVZzk+l/eOPpY6mT/t+5JE

D1sAw1XBV/7lhQCbW/4DCqXFAywJjQQ0O7jSkaspkGrf+WbcrJb6jcO2WJ3zBzsF+sbrnTYKTMQhGdBn

zo9XelUyRDzGQZF/36w5Yu0NzG1IR940iFfCGye/at
zUgvePz+ShIF0PXZiQipDDWH+CZ144fiVV8xm8pEXEqEchFe8krl3mx+mCqbHFNho41+ckZJZVs+byZf

3YBu1XSDRbOL/+UgNiO736UCYSi6UaGZ+fhLkfbGDxwb8YxJjHzlQm1GoIq6z3AJnySX6j1ViILLusjM

7vnNWn+4GKvpxG5rd6S2AEDDw9G7jVCsuCdHf9h/w5
Fz5qOxuK6m4EaONNttlg5hGOhDI1cjF3laX/NBNzfmPrV/y6M1gMnO5CT9mtbKQD58+rP41KTR8C2eEj

zJsyoJg5//bUrYWkOcxgOUzY5+rGVo67vV2XDBXqCdxUjt7kmFk+WDWz7CaWo+/UiI4Q2kePh8gL3DWh

kuOc/Cnt3StlFj8pcd20ob+qP1s21e5MKblkdqcnqv
6fJlXh4NTm2ROxxpidXzwzw/vn3N94ByeIqHo86sTcc1K2lJZ3w8ec6QW9WC8ke2+su7QrAEz88zgrZP

jxYl8bJu+HrKfaioXbfoUm4WtQ9k1mrfS5/e0L7P+hp6ua/L/IcVd577ycyt9adget0TYJxW8BSfVWJv

qD3cUh2v9O3gJmMvbkh5xghbofxQ2KiVItt9tPaaE1
pFzWtyv8a1lcMtR3sonIlZYF2V5nzm0cSnOnldz5LzehxnCJ4rqMSjTmsmOIhm3xYZwUAu6t0owEDoMJ

9kIp68x1ghbLa9qWMf7DjxlBm5HNQ9xnZncUhKnOqmf0qw4QfASXOx/UmrFsDzWrbyR6wYkwRmSnwZIr

lzmGRAiyfgmhx6OYYRcxgPMtnzXkuSV+194AoZ0SRo
F7Ge5t0zJ95C/upjsO0gEsDnyLXwSRbBUUBbG/P5BUvbxW7mTfpnOZGRildiyr+26vKS0dzUfDX0uj2H

ZlSM5KcdULSaKneUJmVFRLta6TMfEXOEiId6kJu2qrtY/Sb650353jfddI6Mbi2TJAL4oNZDdmSZONco

K+FPD1/s9jO/YlTBwyravTxyILBoMUrrD1YmBLDfPM
rn2EEbrOLsb89qZ/TZo9Xikpy0Z835e8KiQTvxJFpYKXAOrU5UbsIrpLwCCsY7lAXphedlv4eBeuUuU+

VxtkSWP6rTG46JjhdK6Tbnlgg4r0kRnVjYRfp9gy4y5MhnRejTmvCJfq+wF8AsHlXslbDRXZfX4QmHFB

hCqF6euonqFONrEQeus1A21cXSWi4PXTm98rINyo9c
z/VWp1uFWszzeCuqc/id8kSCkPARjGw6V5K0GS40r7EEwbosg/0uXPfe07p+mfegv2Bns1WmP4FGSWXf

diF1ksIo71jUxTS5UPdARHiKQc8kleax4yfeeK3Qs7RfMEsjAbfGVp88/4gmdyeOh9glSTghUzLV0nhO

aHEp48Hs1bphbY1+aUcm1DrU7DjWJkfG2dITFqchbF
gVs+oK2u7v3naQPHGmiQfnoWV+wPq7jpob1KQJPZbCCjU7k1bCO9qrCclIMh3lq5yYe3Cx1rBp/HMNEO

dYxv/NLoHBY390mbVV187wt7axKdOqBAVVXFe796JTZjdNfM0JdDHDDJFTT+hbOzCYnBBEXv+mlRsI0e

/aVP6+aFr5pITMaYgStvwm+nebjgx9yOtyHBv7ABBd
ezNdWY5qRh+GHmz3thL/sTnr+VLUqFreVHi7s2BguT0p8V9xj1d/bP+fBFyv4LJbykB35kuffeFpym7D

SS6XkjoI0Yy0WcY5X/mE//Nzl/KfbxcjpqgDLozLzH+ZoRTGIBByYfm/9F8vUDURs1rcrHxaPuQaHOsU

cqYodvcH156jvFgDf85Cv9hIfoBfc7GrEFXg+0P4pj
ZWAXmYcqLv4hGNS9xKx9TG8QrvTuAr0Vwu4nJb1qbS12QYEW5iBLyUU0YQ0yxDqG6zGP+RXGVf3j+ach

bfPuJj326JKVl45aDKdmR9GxpBBPZrm/3+1Ut4FpC6DHVmVkvyyOXb67yCgB5Kl8bqTRgE5ryMzOt/kf

h2Htpooe2vtYqwW3H+hQ6faO76wJL572qDvVLudB25
NZEyGWJL6JG2T4PgU9aRs/QS7gvHrWQWRAExPnsE5lxEhQgt5rYJfsZg35DYeTdVM/Tbzd/BY4afPQE2

2RsThjgmJ9xlak3RAYWa2vdhiFlz9WyDW699097CC5f95WdkdSH9KwwOMEWlQsDNwBTueqh6bn4oiXTI

jIDJFGeUWejcZpQdzJvg+iLEoRJIq51DJWpRAartT6
td2MJIuLjLeUnXtRj9BodFKwHMpcVWixK70KYX7o1MijYKJY3aKRYTD5qTBbT3Z0M5yHC3HoOAioQkyY

Y89fleQN3PRxANf/PhySli8ILUPAJ2Uj1pyBpLf8JCBpK3mpvjtfj1/02aGgXxWkqdHU4KHhCgVERD6t

AE+Q1K0wAgmxHz/DedIgSKOO3fA7ejA/V33HFfVXkx
eLy8qQpDy8q2pHWKC1hSxqH9GfXPJ1BwWp6RcuRE8/oirC/WBVY12vn4uNCKSF/0tqLDLioSGZM3106h

sfYfcbgulT2C8q4tRWwq+OtmgsTRwcLozszoph/T6AQNJH6lz37/E9cW8n49crTXTGBptw1SEtmdZ3EX

/1T0uMMCJxa3I+D6OIajjl6gK/esvA7RBrAeRfNKsQ
jOGougGVyYsiVP9Noc2BFakGx1E48YiD+REN8DORcbG3C09DEvX1O50BPDb5RS+/xrgNF+1TXgwVj/8g

nJTRYYj/UoAC4BlA/VaQYMXUQgXLhh8pm0Abjz7wMXpp4GRPkVlZOCRV036TrF1+Bff/udoRxceEP37X

cz8HjwdRB/uh3DX6UiHCOuY/5sHUdyPjT8bal2wL6l
cO+Vtg22rWLnReSt11arU/B8x35qr65WSIbECj+hKz1mgfo6HCVfwE425JG21XIEvFZOZXrJ0LqQdyEf

L/6RexLZSfe/6yhS6s/2iPBjjhCRNWFC9nHIGqAtPp4mS/6SAQhM1bt3b5oSTGanhE3axTuy3/xOGOZ2

PPaz28kZgz96pgXELHAx2/oTwDi79Rlfm0ggXRZahb
CtY+Oyz1eCu+p2UHa2TbN/IN7vIurOHwXw7C83AwauQZ6WQ6DyPJV8rAdeFo53p9UEpIMYcPSzyDtFT3

uLSU5KPyZwvfgzn5AQ71TcGnybqodNwPPD4/vI63hoEyAJUWKF5et3U53KP6JjmLxI0txNqu5dWxOREv

9/Ckh1EdoN/XljKYXl8JxqOG2H3mwRNNeVtDeGvgM0
sy4+B0x2mawbjortCbF7BxCca1K2Q2V7GNN7GTcDzVqsMi/zvEUJ7hXRUyJTDlfWhtwc3zkasy5i8rb+

a1cFflsH9+d32d/km4f1+G1DLcYO3I9pNnc6WUPsIf577aboE/vCGb0ivzIQFQXPgqre6VcnrVzpNjTN

FrjzOTifb6qgcGj1DYq/0F1OdCwKxj40cLPegukl1f
vo1vSvRlgR7S6XL/KjJ039fdj1SJra8RuJsZWzoCwOIp+zg8UFDxst0WlawpcsMMsrfBhSbT+5hk9vUm

0LzeXQjM1U7r2KP8/RPf5W6bRl9+YId9H2wKgQ+x8a//Jh/MYbM0AFr/Ve+lPNNu4Bb2pp/2kcnNJHv+

iylg9mC3L+rOLnUtgxMsuG1H2WOgaMsWNvIzcF67x5
5qHlJYsjpdXyAirKlqaPQc+aMs4xob+SVHzDYXkNaIP0/I0AFU7o78oXtUTamWQdfodul7ojaPuhGZ6a

sGoAbTeE+PEZJDSeBWfLNVorml5e/O1+/fzKI8cg3iSXYJgfqfVDuHm2pvqfn+gwD24a9e8SpnC1FDgh

A+KVuKeClNE/0WpkhVTolUtqx39GMNnY/byY0TyirV
POAofAbl8ChzKJt3JNBH+6O77x7rt/nUWG5wek0Rz7M38QXoB8l2iBiTVoJm2PZdT1QLbsDeGcR2ukIc

WGBkbOAb83C62WKoloXZ0SR+IRpg7D5sf9h1teh94VNwvMYGVKtw5CXQgfmmvTbTleaam1FqB+GDBdv9

gMy1kvTve7O/6EYMgHPAi9DikoZB7fKZT+aFrz7Kcp
nF+AQXFm/Zqcxtas8i+OYKlWXceLbd7HMF4ZlPsI0Q0OydZI34X2u2BtSttBzAzzwV3u1z57ISXDowoM

H+ALZC4PYzXcsl3G1ukc1iBu2kq0kgsBeJFmRP8PAV8nunFkQqq3YPcTmdk3FgbDcQz0/UmBPWjJGwwl

OGFfCUKNRcFI4RfBMz5lMzA2tgqohLQQeQel+aXu+L
qHd5FbnRb9UEc3pPp4Twprd9/iUGFlIEZsmD5XJsOo7si6SKB/XjQkj/wRsGk8SrO+7lpH1pIB1d0LgB

23o1Nx9ZrR127eoARBlHyDfQr82Cp+Z2opYtG8c+KgvQVq+KS2x37W26wZLUU0jLyKElgKLzmy0mfSjf

EO64SfmowvV7KHQUit5XPNpr/WgBMcCFJ1VX7P3sXK
TNlZWmDAF2vYPehI+4ecMEkp48kJ/2L8vQd9alrfOLMqmNc0Q0MKhSJsmGd8feEA/+bUQKkicY2nX32T

mntMTyPUrTTgtZQRdHLM/G0cqA91QM040euj4WmUMA/CwghrxdE1OHyUKiqYio2hTwiI9M5VlsJ1wczZ

UZXiI2mU/Vk2KHf1caiHOI3fPLLoOLNus9cLwGCtel
tJW/4dRdEF17Sbjyxh6zAAQm0CPl8GAXlvmw1Y7kIrhBMIrzhDmGUMhE8bHU8UqLmlnNp5q9bEQG1+7N

tNwOIjkNLLWeYNsCPJ5MRAL+KDSgvIp9fAXtcctGOLyxUzhHPsJLvMe40zPRQKf1wIYEscF3FgjHl+3r

dtCn+8Tyz+Em988odB8k846BLJ6jWWuVybKqlqT+zt
pq1/Mo0qlQ12UWx/9rLCzL0aMvY7Wek8sc6LudY6G4ar8Z+dh3/QlxaeWOqnsBJ//U1dtd7gf0TpNpwR

yqp9vp9oLVqWlsnTAC2SugjDC39Y8zfPrxshHeH6Ur5MhMnXsnj00vuBt7tW3VB7+4uuQTI5b9A8C1OX

ZFjWNWhqtMGDgLv2jzMWr0cAc4pJv42gkn+mNOH6MS
D2900Nu01wng8bWpeM8JierhprFecpf/zl+0ILO+btRJ/dKAS8vqzOOZh6DPWn3J/Q5ztk9kxIjBL0Ut

S8/f5ZmZ6lfNjqcaEA/NrJI+0a0M5v1rB0fi0aK/Z6jQ+uapViKMkhhjCuS//aulZ8ErImnM/GAlwBka

3f7Qv7Q1/sQNS/crcSrGd5ovsx8d8acOk/LoQ34ncY
rcA/RjeAvz3lhuYZdL1IqjgPWvY9RHkj0ydwE4vva6f+JHiL9bGSSJfsnINabkClvYlS+3aCDn2abOlN

UseWH3VkZfVyWDT86TGxqK+sdruK/BjBHkJvQiFPbA+seTFnwOGFDnXGwaSCc6Jt/Bf9RMjOcWW5N2Kk

hGVcAR7xqALMZO1aOhXRgbbmYCv/SqqPmx5ukDO3Uh
KwkbDcWXEAHT9Suri99KfRHJoQQdkvUYvR4YYbSjtfMrb5wmGL5vleSqbO3NPFdZPGl/HVvPAwxXT41A

Qmwor1GIJXeAAX6V7+9gEGyuGv8IlndkErDaHKWMiyId7PEERfvezVKQPIGpb/NpEpkZNyBiO+TC94C/

Yem2k93JHEtumLsyYIDui9jEqzdk7hhFz4RPkp8f1L
ss/dsw5HFmRBpzqrkmyN57tlehP6DXofKh8WR2lveE9p86Jwobm/uQ8HZzBk7DoFaM2AZ9GS9FlvX9w1

x83Bawr+22QY/mCWilfMvO8h653JLjO7OTtQJblHNhgJrdRuKsXxKYEtQdJtWZGn07DU7XO/EnSE+pZG

u2QHHlMf1K1kFTDW655YGqyKBhz4jWy5T294q1eUqj
1gq9aBsA75j/yigO7SGT+mBgTkDkphHMFbzWDav+eKIChSKDa0c5zDqxnpWFI1Alr7m58WtHMqlY7rG+

FQF2nxgJ7UtDNurS7smI4wRaiyXA9IKATzxGuEATC1TNusrqG7SyR0Yc5YDKPCT3QZ7lNMSwrvBguv/r

YqheI5AMNgq1TMiavg1pYY2xRmKCK1+36wSFOR5yBN
wuY4cmiTvYWXsNJ/pLvu7Zchs3t/PK/daXh30U+XK67+0vKNJuQgXpJ17jIiXehLeGPO/D2MEk6+MUx5

VYmdgatpTDJYvSunaOwrioHtbThcK237Cgb3UB5ZD2o/U1ottUJ1I6pY/S1BbwcaBtmECaQO+sJda3nf

0NqL027fXueyF8uQUYnSDa3CmHmxVXw4S56ECLGT0C
+4KScNKIp9z8g/jPvZNqS856zdbDGhMoqPIF+MmrbKzBnMo9/qdAcB9vg1oAx6GyNwCwyOI5q2j4HPWv

BbIe+ZbGHu6rCksG4Lp7IOQAJssUBFSSOHcSyx4OJplw281LUKP3axxO+seFpy59WxTtmFZztwIpfopH

OTCYTit04A3f38dQMvTanuDNfGJH9nGz+0MRJmI7/n
1jKhraqvPYHVKSWMuyOUbQ7QEc3Dwua25l5mFcF8+TQcF6FxDWKIAaDeWgJ/Pe81Qqv4Sa+B0JgOxbM4

MlR7l45vJYTood0sLDDuKkpeZtCcMHoMGOHhui6Hr+dC7On17XybimH0Sshfcw70ykkhftv0+twWpvpd

WyxDjZ82GfBwsSuezRofb/LmFw9Kb6BIUd5TQrhXfm
IodVZOgErOMME0BcqBA2QzSRZvYAH9d9R3WlzTA6PtCbRUyMZRURGslvif9R2NqpdtMX6BxMz3zp/OSg

rNDfd3XQ7Kg6JCO1UwlEDTRG2gRzHmn+r8jtyqhYeJnOxFVhlFHo/dJJjZi7I1rAK1sbhKRJZV7tq679

ZGQZHNmf6eb+ipjt6aXnoWWeQBuDfVm43ASJjnzBx6
sj6i5lhaG6sHd6DBpDj7xUHJ2/65LWRLnMbpcB88/8U0z/C3vDuD9xuqhWc62qkAAt6RnuMw89kjiBZf

1Ipu1mNohXwu+4jQ5qMNYBOToRoJkD133+wjZ4wVclkbXlrTLzxsmIYUlSjbD9j0iVbmj3kzTmSoEL5d

L9/QrXeiV4BrYzn9K73BeFaEdJvcmtFAnvIJXlNru4
pdkDWOqpo81olAqCJFLJL03AqlAuXuAW/LZ5M87Hfa1hQ9ymI29zLbobMN77QWvV9J/qi4ClYl1hl63r

bSG7BWSsT8z3lMRQaXh42Sk6MpXypM3ncO1er+WBPa2PdDCc4ooyDeLC8neoYwQNw5neRyboH1mgJxwm

Jj3xPx0K2ycgQ6t3J3IAaaaLwWhntBDXAUu1+utKrG
bK1hJTfHK3463mfhtpOtwepf8M39avYhzqQJSgP/nC+na7q59EfEH5VNkSL0/QVNEkqfKIH+YlGV2RzW

nUv4vWIpqsroBiTNZqqEa7bQe4J1ItMe2pLLWqWL2y7x2JUzpNedUd/TsdcBb5HmossRjhwNpDn6wTu0

mWpYHc3n0CrXCxNiLuOdBQW9wmh7e55rCVXlJ8byZj
jcg22vC2GJGH0J8A0yXNrVlZDgZI/XkPeb+VET5lkHkOyHT3hHO2UnVaDIrpaUL7L8TAwYG4bKPZ5tpf

uTmXrUFQfVewgbO9Cb5T+5pP9JEzf+aHD+pZ1yexqBFdAt+oEy0VjL3E2dh6MFTMQsneZSuoIPmFfpOs

JXYBEJXKiMPlN5srA4+zkeKX/y3L2K4W9EMrJMmcAt
Jbj1LyZ6JeEbGhJzpremRfaJfbqSFIe7BA7d3wI+9RL7Yxv0hmS/h/NhB+7cqRmyWJXS3nl89LvWuDzq

r+oNlv8wFtIBuT/8qgZYN+SjTsJSomknNxQtWWwdLdrgh0x+/6EVfMIaBE7atgrxcxfLlXmbjvTvEA/n

vSyoRFLKoEddm2T428uQM+BmN95AKebApJfwg93SpM
zOf2YGaJkTdMcvNkHaeQRmILJgV+iBxw6st5WDEbO/nO17rcd9x4Nm8fraBIZtqUMy/LDg7iAwcNEo5J

hrDBqFA5XHMSNlshUhaihv9d5jO9dAw6g5hhdzgkbJ/nA5ndyr4qQ5nFINVrLKzvBoodyPC6FMYXCBSs

NMmNwuXNWYtGy2AeWusaMKZEDdk1M0kfqchXY9xE7N
vySqHVUhQGY+ChiFpdHyxFCK2m4OgcN+TuYlZPlhLOYk2e8ggoZKw5pjd5d78koeH/W0M7h22iE46rfP

672QGLtzKUHj/OpvVki+MS5W7qKcdHW+9PEboFR4853EnWZBuybk3UbPTKEHBRPcgvaST61x/Ta9/Qg1

oreQ4ot1XloGPXYzRDJOz3wzoL0I/xI29f+mvlG/lG
fTCtZYaB/wo32cV9A1Tft+Y2ZrMrhDMHYXOKlg+ssSopCC5ZdgrN/1lT9a0v9mS9nxQv43kb98kIuCJR

4Gru0YlUz3y88zawjBF6MXSa/M/pNZNZ4p6IPjdgUoKMYiR2eLkqVDN9LXdfnvMbHd+Un3yfUa/cyTjJ

PnWfgrZri8SRk2zb+36wMMFLBXjmqsugDPiYdjNdkJ
bmuNs7sLBt4zMxx8eIQQ7W3zxSp/GP5BQEf806lUFjv73asuc4Wt4lbOFNa9CMRkozNjB88/GfbHIzBU

sqmbPqo6EO6z2QPSDuUZWdsftSH5CZ2bdNtd3TELrC65si1aT/IzH/UJSoPX1aU0MB77Y1/Kfb1G6sKe

371pbiaTo89n6XHIrean9gLBrLT8Cozaz/uNna5z9q
cljvKdbbNMaH5+S+E4bmNGcbCaKNqWuPGv7K4LYO4tZtQBB+aViZNQvwLs1Ur/H+QynGtce4CBjNJOdt

9WQJ1LSqSFFlWGDkmX89UOjD4qVtxjNxoABOP+16X789su82hJLn4hFqcg2pm87BK38kLNnHjRFAkSVh

JKDX/3bL1lWuWduUa6Kaqky57lRksofwgM2b/DxOVR
w0cMP2/s3zHPvMPJUE8gZRUP1YY/UPYiCoyL4w674S4b0WVrsi8ut7tl2cn9mO0x/+XKEdpmHhKrfS69

Dlg6ZpVhYb8OSVnYSeoOzC3SeuztoLRibY93o9ox4gSpYe0Vybp0F9f6o9o+UP6wBn70bLXadP3eqNx5

2S+SJoBqjXIycRQ6mSTl4V9wycm+ZTSnypqVI4RHi1
RECjq9Jado0UZ9Lv8EfEGr3nla+I+0nc/l132y0H2s4QYXiw1PiZ3cr4f8vY+fOpXkqugHcgGHWiuZlL

p2HKQEHDXGdBWlmyZMALwTCWJ/d/IhXJZQAw60eiUB9w+fFkunf4E0EJWu+zJcSUDgTsuvLSUlXJafdm

/4viNJbiZX1Kzo7CeJ+AaMAfW8PZt3ckCeNaX3S7sb
n3ssbGmwPp5nJMR9wDfxegFXM3EV2Mlavubqv5tmfjNsNvm8WDABgugpkJamI2cZ8RdmvvVBHgW7WKpN

/YZXh7lg8+nrNqKV3YdmgtLQFpE1Wbk+830ncvknkJnuclX/lsdR+/QMfHVHSkPNQkKwPUGrjQ2/Qrja

mzD6eXNPHi06/nBUAb8mB78EsScB2amzCrQ2psAQbl
awoDN3sUcdYpJ7nVhSRdMQ5yMKDDVOU6k+i2EkEVSGMhhmLuKjN/f+6zN+mRfKeaH500TExu9153P6lj

cpNP83qxOma0Vo5c09Rk0I6deAbOKZdc1bETVenXDZC7Dm5yiuM6CxzQI3mY8sb/iOmU+xi6hP71Qn0M

Bs2KHm6leDAuJ4F9Omx+/vpY9wVExCC70ZJkYqvlko
NA+/i6l15MQN0hH+jIuVnCtpwerVl7f3kspamRRoUlBH8304pLUBMPj8FaQYb5Taw2NflnpProSzgWok

/jlp5xMymQdNioFVlXkWFzUMM1QqP6qqN6Lde5rrmSn+Rqjk2IoW4J3WANCEeIRJqkaIg0AnlrmvRqlo

NAi4JjSa/A+zvL4wumEKe8beNwns8G1agKK1cabrd0
0tXF1QzaGEiEhIYgm+5h35AvB4rhRrlwFMnHfzy0wOlsR+0wy7Lv8y/5+bl5wYwo6I07c+vviOYrxhBq

5Fj9PeyinzYOYqaCUskiVQsiJKCenXnKApiQ/uj9wog7ykL0ID94rDV2Lr1IGqFO+2h/XmYD1EAIlv0T

l9j+pVidVjBhMuwt7eFLQsy6IKR5ZF+hHGdYTR7sg3
nQT5Pg231M3JzlGT9YO3KCy86p5+d+m9ewI42lWSXNiHIZXclc8UsosNoa0CpYT1VzawbbtjoDgYoTq5

J73K3Ni/IoH9cPubQ2xFebMyFNOtDfJYMlRKNqXEoA/lwrho62lCUpAIARDYCsSFoGPGiP1+HtrJQG3b

UKz/CfGoCk/LnNMDvFt3cxPhTMme3VvxK50GBXnTqq
oNP0X3YSyataS66Fee0weq7dAxK2jrM5PUS1TAh43wOaZ1uAoWas4qUvm62ykE2eDZOUUfRv+6t2xHFu

A0hNOVhQxsO9T/EssYoU1PpQ68OAPiVp2SGZirKl3Tbxq8OhPxDsP75ZdwJ/DS6zkuKC77O29+heDiof

tFIPuhdMSFd3yxYG2w+P4/hZz4T67NneJ7NjST5Q9b
TEhqd1bh6TpRlu2+7N0ZCkKRs/mos1mIafRJpkcvXjue8Hx9lFtif3uLmkbqy1xJJjmR+gqbcpKL/wD2

8lg1aM4kNO1ALf5IicNvsGGjpZhsxw0f9S1bisFT3MZq9bIv29RlyqooJIlX5rZDvmqD83ZYvluQT4v8

2eu2+owxzAGTn5MzTblIB0MKhd5qJ959+1c6+NxjaX
/EJeyhrQcoj0eSiZ5wRdYumzk9pNorLn1O8NmCVDTTwovAs45rKcDAw7G2OYVa5YJZCzqhsVQ4GmZnv9

ZNsha5qLaQnLzHesAPsK1LMO5eNOxVkWm/7LhhxlWAAVGTyjhOWMpWbgtDMbFxG2bSP0/f8s0LRE3yTq

nZ7yqGVUJ4svSwFjIQ+TybOrXPvS3P4b6DJweb+cBv
pjy5xD/bqncrPV/truX7h1OFcuKRIQo74nocOPLqeYNHYIugW0aRb12Xf/xvDU/OVRxPqAN6F+VJLSWu

hPO+vdDN1jLh5mMmGWnw/fE5jkh0PsasexfoA1qdk7JGRzkbMmU0eXzpET9wZU82SKAXW8PLHPD1FjkQ

vNiLiK5ZdqA7a9UlF1TQJidP8+ZQFdchxwRF1Vl5+0
9MAEtK3u/8GDi4FcbOi15fzdoXP8x+KXFh/bz3YbjSkYlP0Sb23eEmBaYriD5QfKoq1jvZw/ER20o9ko

AHU6hq864YI9YDh00qumDlCz8zM9Qw02HmFmg3O91Q9yDjO9Zy2V4oIREKNM96YRMecWMs48MyqKPoII

SAOY4c96OYJM93Al70dWDzoRJzIWXdH0i0NKAZWf3B
6dK6XIgTKbU7P0NRRnLj4PJ5Cc7yxaNFi9kWR9mFS/vme/Wm0ome+gUcFf8RsobVEOMwQd/0Clo0RnyN

9I97PKOzON4qPBWuQNMYz3s48nc6V9cmq13nqvNoKTwsUg3clVw+cL9Qcflx0pxiTOSKvPEtL111Kue/

8CirLB+7Ogz1sAcClE79fjbETK+ZGrqTNJhy/9ySDZ
i+X1M1peltgp+vr/kAS68k8my9ynsuBDGBrmCy0yLiCGhtAmhh9S+TYORubIWVmAnBVLqGLrgeeZwkb1

DOfAB7jrOjlJnn379/L4EmPf71nXzB6HrsK3fhZBGijG6lwpzI6V6m339TKm+C/SxgHBjDwWKZtFQ4XO

UhdQIM5DCuLUAUSJJxWhBpG23YkCe2IUfVg0R3uTGv
bFr49Q8qgRxDCBR9jD5rEOMVA9fyhzHKnUpSLXz+qPQI6Zzw22p75OFqomyXiYHuS0lrBsPefwtdwEe/

GNefUmKhpab7siPTEFT6LklNz5yCTGCVufVzFeKtlGVb0NzkzWaSalxfQWStdOTnPiwRzw0d1BLExo3p

Sh/Gjm5HIfcXMJOQli1GZyb4tqT4bdLT9NUti8tnyX
L2J7XtgdOPUGsb6J059i6HJyOQRL9DlNhCKFPQOzAFY2+jj7ucLLB/n7guHDq0U8op89gu1yqWxCn+zq

n9rR2MxPZaa/ihO6GadTsv3DWlK10FW75vfBCa+IOpjUL5IU/qwrQLHymf8QofwB2YXVECkrOywwg43C

wFVhByz5k6T036eJbdkzKTAnbrPzgXKn7zuJbHTDro
kn/nQgPQNoUiPyNKeu6ZFFakeXBkyrGbth20jDYC56LIsHT5OveDUikjbcSQEKUz8IiROruSXZ2tInAI

K8e0+rJJdViJEAjHBT85HTp5jO9j12c+YI8L69sJk0s8yTHIAXzm5fntkERG0WnoZRAbX5WV8KiaMYzT

JYKXEvtMdH7AUW9wJGlfh4/+hOrPOOtGYcNZaagrfK
owVkrx8v7G6xr0cutr3oXJ3T84L4CSgPexxaN8ddjZ4znC3ddvaG9XY8HtsknLAvG+wFWjxLkN6QnvI5

toU5KzSc3LftpgSY4Zc4gz8xEY6SstRh26mYubFVUD1+lKc0YCs741eYb3bu3Pf7r+0ur7ShbnqJvWAv

LUYospar2RQoCQ+9JNrXAPvaeMjvrbOAmDcenA/8D4
oj5bxtl2J22I+ASe2qUwi7vBnY1v75fd+jtoMG7YqAO2snWvSshx36zPXG41BSEwfWKv+7CAxLqhGmPO

FxTSxUJeQ8eeAN+8EIWJT9eacmtm0cfBVd/8SAXVzjAe72nLsHPbmd3lIpKgimBeU4wnlZZ2/gAwsgdA

WMcu/6PIhNHwJSTiaiyUMJK4Qmsu9n0B6/OpUPZnrh
9AY9BJ9yiUjBBcYfyY05b/h/y3lSwfvNNldLg9maoDCZTynC0sLwzvvGKZmMHt2/myMm22pTn0nSvPya

qofFTHFiMoWGVdeDsVgREIKHGw8xa1XnvZRqvD3oTPgu2NJMn2iv3KRNJA0P0mCky70z6+f+Jxtn88/l

lF2gDRQgAsB8CM7wCbPk47NU/rgPw/ePzt/zOP8/C0
kXS5nuYZImrMZQcnB5V5s4QDENa+R2Pc1a3hw+vjd9Vj9a/5FDL7+2+QrelVpp3Hp27yvkYbcC8IpXGP

/RcWPIgof1n+DtbozjhKp9KRLhB3Y+MHSazOHmRlszURwlUV2fOaCQn5oz+9XZqi85dbmzHtuHu59mwa

UOiz/611S6hi+HHc3O6PTmCdMDvXS+9DT+Hvd+z0mQ
TGt2UWAtMEuZYMacMiKN+qHTto0l0oGwzFsZ/U27FCSD58isdl9vSw0LcMmluA7PvNcqra8DqTaUmddA

GI7H0X3ncv8LO7OdxkBP87fz/qePipOQdVXRHZvHbl2+WuSRJ7dj5fHJ65zpGPv5ZTMbpXKp7jrJ8t/a

S3IUTQk8hZb5KLPhIlWh9ZcuuQCSSXXmbLzqqifOnZ
vuQXoRu+cjoOtr39U0l9tFADM007jIWeNW4C1+Qmq3enpgKEZbl5oucjqctrVPlVc/L7hIbIOmv1P/xl

ZJ3QVr/UOgTs1t9ersVyupUNdW53JbK9JiPiWaVJyiEJ3/kx0/fAfYNBtqzD/h4PIW4cmnTNocGCLYOd

6cX9BMd4yMFqsT0t7CATec3DgpowGZT8K04/u3QKl8
0okhGNm8vSBtbHMKqlZt2USycJ7KHT8JzsxW8bSMa1yNzlImUID2PDZduRqfIRbcaDmh8QCNxTO4JQMo

MCe/X06U/L1I/9MtJY9LWMVlDcaixu/bwH9Qz/Y5dXXkDOlRGf/lI+/VWzeveb/uBUR6jhn/FPQm+Hzt

ldA+fqXt8tqRBkhJY2OxdHFZ+eua3DZG78btJAQxuo
qBvusPrBqXPMdg8QSGVNfao1AMlm/7Si9e79iMSBwFqG6NGrDkVxdK0OLDFAcT+TsQbeivjm9EnZfk86

MBnixif94/ZMcsG6z4podFqOuWzdkz2xyTnAVjPyxYARr32O/yofAVFsC8eOk38lw9+IQCHaUYq1LjbM

wovv0f291ktL1278gRV4D4s4ftNs0f8tSihV3dnvpZ
ho1k4Cmj8pxEVXwX9gVdbMLpF/UOkIUGh4Rc0jXPSepjiCIYfMjOPvT09LMwjyhuEUJuzXD6rDi+Xoa/

QljTlRnCCcoE4dlezUE4lva0C23e497G7dAfsmIGeGMiTqzDiza4H6tuBGPR1YlwKWuSrluFvlQMjXz9

AHrtGITWm04kjnehkyPRKJ2/D2OWD0T7yqLG2w6Tq1
Vb04dWEmKevv/EkST/SRKJWy/XVOfl/W1xhleHQMMJiMbv4cDvwk+bNzgpaQzpgHcIMZnZOGlD7ifrCo

YoOsAfUeSjgdJtKutiolAcfrqDtwzP77RbhUhGv1tV/yHaw7YjtHpbnOU/M8w0D0BRDJD+h8Z3hz8Lr2

sBqwI4kYUR+rlFftSLO+6Q+8N57YLvAn3lj0+EgDaP
qqNmFCOkYWczT99c1XXr3Xlq9fKLGDJ5/BIkW7Ybd5Y71/Ca5pKjPJkpFthKRuw+hKAo7ncK81i+07EX

lWB0JC7uxmI2d6PtlKwjjPJIw0HYMkeAntvlIh46/wJf48iYfKZTYRGRmmBeWiHr3PDShm8icMryuz4C

v1tb6m4s1xz+2d2R8e9YcocDNnl5V4db5k+xWibl1U
AvagjXSR/DFPjQ6vPdX0Z/qWfOG8pEvkzpbY4K+DqwEuKXccWqac7zXwj4NqyR1x8MpbOO/50/2zTivR

TE0H8u+K7rOcx+beZqS39fSYQjQB7CUXeS1TOZQudBF9muaznnSAekqiprDum2mEYnwMB1dw/DgU9fDR

9z3ADgKPLdrWrJ1ZXdt6870OLMrtuGdwrzBEFnBL98
jtgCgFqcMS9Bn16G0vW9WCHSUBT22tk1X6WKp6GmzbV0Br7iwv4931CPKicnyf34js67DnYZgvZCNuxm

TsUEoVo4ZkCdYEuIwaCvCDXP/0Qb+th/QDxKEkQUTAZYwGDEKnSJI012q9wrhAN3256W5onXC0DuV/Ue

T+xT2MrB+gxNdEG6nfeKgnsiSi6PhfAIivaf6TR0Cp
K8itPldx7hq9Cj9wcNQSvbB7LNvcZbzK6Ins2RW7KB/NA/IuZ2lyh2gSzT+6jn6/odbG5hCc+geOrN+K

HopyMuxqxXHZjEEg4Xlbt6AOQvnsqDwXJjcMw98bUoMSEuZt/OIYZM9S/9SiCQq7WxpSBerzoFdD3+74

kV+7gEYR3nc0uVKE/UNKy34HEfS3m7RWIdsUnCECdD
axlrV2MnHLgpiJYd/EuRgEo19/99klOlWWUcOHv6ioY+8j0Dy2J2QpT4U9iY0onEDnA3D7A3ngOlFQaU

Xr/SFxNBSNGE/XZE4UwG939yXFy20HgKIuvBu8fVfNMphIrjowN3o0mCt14m+74IdP0tpsiyfHvEiwBO

261H0U71BimLTicQEiiNMPvueqyw8ZLUgvZj6N32yi
ylhI0MqOozWHu2cArqOH/3mK9relqtnombXg6i7mdXxa084YanMMmIfgLYwP7XVsuQ63WapuOIALDecH

6yKtbgvMnxNuBqSvVPulceQLwMwzuJW6wgpe3PceDHZekwJoOWA85yg4qjSUE3Iu+Rf7ZfZcLxCrMywF

Uprja0V/8hL4dBslOED2r0by7jY889Ld0ky2Ecvo0F
qRPXLywkupUx1zW87KikDcFBOO82zKoCjTxjjSh4nXL4UvpY+KkUu18HAzL6rtY0duFfFkGY/3W0ss4U

ZeofvXF7zgeiNDwy7k9XbOpXThLcmy4eGEr5e79YUKBdi1HxSfV8oZHMiE8i0Wxb3pfvlyNXquUPbNrx

G56tFweUzT6u8yt7cMiv0CYufDMVQQl/H2lRw6v9+t
yj+Gr27jTMrBoyO+BAwkDaDNHa7Wq52+OwNVtO29aPQ7AhKDTO/aV8f4ChHXvfT447JO5WZbOmLCcLqD

DyTnE+NYZQ6DpkvRJhM5JPwVqEzuVRAoCz5XxtzQP6GutKIeuRosisMQLDSxIh9amtbRiP6/ohlCSqYV

2Qba+TST3FtXqnKHND7Ei1sfGYlzfFC0rc79mBWLg1
j1C0QJ8IuON39bEWo85sJ1U33uwTqMadbscMOuSeWXVvhdI7m5wF0GrMqKgmEtz3tG6gaC2cOUa+tY60

0QbZ834if5FR9Sg+HRWLkisMmdxu+KXXw5z7N0MbN27todALD8mOEr1t2mQKML5mMiXzNww0X76ZY5HY

gUARcKVbgCrfAUXNXhIJnWips2N7MqYiiUt0h8+P37
0vwIclPMX/SJG/cKXdgYvVD+rPu/cv6XLLE3WfE7PHDxCEE3tGIQN6v2lLm5CppXiJ4Mr2QQmgwZBp+/

MCbkIeHuxnzw018EuJ+/sd+zX3/dQ6yRXFpgSVNNs/hhnqpiwmNB46lO1+9kghArFUpmQjFFqq6iBBm1

iyLc+DLEO/byBU9Z9gkcsujC8VXhtQVJkutd+10JY2
vv2GLTT+2eyqNMJxfKWzx/gwbN8kpuEqJKCwI+X7a/Gwbk8mM3x38/hN47m8OnRyAeRxZe2pgdKiBWA7

yb6y42Z6y3KyDSwW+sqprUired0lgMkaHNrzU/yn9vFZL5vXCcaI0xQLVa4aFzQv0kYgDZbt0eBSOQor

cZEr6uIoNvrILNssqh8I4khVMXbVeCfWLvvXHMMP+y
oOP46ppKu/YUZ+bSrHqcFYcPdN05cuB9Mew/4ZVrbnBwFE+s9vj+nzoVxiZ/oag+NifRG3Vg0I4CoNJV

jW4z2L3iqrZyQxnPtl/kx5j0fABLYcUE31YnyrAwZyvopJY9ruTpMk/rL/QmEcwBI3Lj3kGomynmcLFJ

bT5d/bYRh+e83XZ9FVmLmFIx+PKKBK1eIK+dqjUTD3
4/XHq/kbsFjIs3BXqrxnB8S0Vc8kPPO/ZhPmmjZSPQxUFMWWdJhMXs5kc3ZoBC0GG34hsiaJ8E4IIVoY

8PtXZy/zq2uIEMHfnx3HF1UnSpxb6FkNjjVotk6wb8oFpztNuf+MtVLluaYE3bjvqbIfy0QTZJ2tm+tP

itak7BF7ie4ehh+1i3U4lz6P804u0suGdi+xRr11Cc
SZtUtL5a4bqh9oZiuNVNGG1SJSNyGchmJEikMpIyEAO/mFAtaKdwx5mZwwaRLN+zqN0mt0N8sstavJNj

VMB1hw90DJo+ITks/KVGPUzri4uJCNId2KAsXXNn6Gpv+FQ/2c24f3hv9ZX6jwef6rkAGQCAP13a5roL

jOm9TKvUp50H8a7ixW8+yTYFCVx1TehlniJ6oslOQz
2QCn9vda9fKuBxYhs7gZrpfZw+bFBNkSX8tANstgm0HLF5dWah4nWlR88/KLKh/052GFLFCTddqRpMQX

/E8dKxJ0nx6jYkF88VbF15RqZ/kCg4Y5K2p3R19UDMgy1it4nZW1LFEoUUqE0aQDt6UMWsXrzgUme0h+

+tYOzePhYoPj1FB6oizbStx4a/9T6gAs+aUZa/CxOy
aoXyBrL1F9Jm+cr+uxMZTd1J8QjsvB/w7IphRgNJh/yZQP/qvdZfHqtbsusXxlpqAP+TW0JYye3we3Dx

0/zb1GySSWpFXT0GCa2CArMSXQ1yVYJdLrVVmNBOtaZyRj1Bo4Uc6b9Vy2ukpXgC39qGKpglB9/tnQSL

nKNzmRBTrq1e76OqRf13hGortmOmYCj0tXTGunD2e7
Py8hrQpA83b+7rwtDzOxVg5DnJvRpl6JHEJd1aPz0wMCE6OQdplaB0RYHavN0xjpXvV06EirJNEMrcFG

dQYPcT8lZ4w3/7NEqJgsxhWWE8Zsn4ymGF7w/p/mn5PyrUFXbqrIJNv+nM9uRmWqozFBXPFzyPyfGSFk

Oo6vEW0BsuqfuyR62D4EwkF4c7BhVG1divrK62od4g
xT0vNVQpeOhVJ0oLeXtveP4OJyocZH/8L/daLntpZk2RRfQQxGYfP0QGJ1CqCd1El5MtdQCYSjTMcqUs

FTLNDi5tho9MqtZp8EHjEKfYRl09/4fgyq0z3Lru5zm5nTw+rE85K/uc3+ysvdY+J7+guaT6lchvOTBz

VZX8VbIQ6UMWDHD/WZBj80562LM+Z5KsgTcM9h8Jqg
2kQLRLEQ/WnBDt4TILN8O2axaoqADBoiEGluep3J+h1ipKd4wp7ZniaRu4LPiGtoTzamBmDIcIkytBq+

rMOP3J/4W3Ycwy69xZiGoq1ZcgZnkpiCO+ZTtN8QGXJSXvWjFQK4e0gEzWS6wqh070+JKw5pLn8/XKXH

tWl1FUZpDOiXSPSp9FcqcbL5tj+AOuBoF5eKdHw3Vw
oT28mRAuYU8qRqBjHyLG0rFpI0NFjCChn6AiMAeZ05CKdMJK7GVQE+H8gZDFZsR8l/oYzcjqEfcXbrtP

nEeODQ5VGw1zL77sNhdjhDiD723sQgfnUlThQwZ4YjFA6BgcOgRMM3N9gbtdts8HsILyIwg/TZVaGgO5

V8cCcG74xbZY/O113bU27tZZqb1OgXoiohbepPwrtT
B34XNiVTvQw1DZmpOCHrtUjM5cZF+uSnCrho9ItaG1ANJ//Z7T95dnv4KGsds1g/opjqInXDA60IROXq

PBQW/mDjmTbKaLBNnZGG1+c8COf6hJFRy2nr8xnpF6in8Ub4ITbcherr60DyVYZvW8nZGpqLWjYl+ulP

RN9Sa4dtC2HphD7hmmRQpVuxkF0taDrGJHfsAP10D2
m323z2pCmHVjZHViuAvTtGArmIAeA9esSZapZxTY62D/OyCMy21Z3i3Ti9PCBmzW+Va/ekAf4Jc1NaPV

pBy8lBKgP/S+t6RRRh+JWqc4NETMZePpQpdWa7ENwaV5fVvWZxlaQCvMcJm8ewLqjaDUxv5hkLdutZOt

+/hYSzqWFZ5vtrIbRX3AH333ew5g/+y/oPvhD3ofON
y2VJiV9V90AgJ4gz71oVMqmnf0fu6PDbAFqvrkj4meIbde7ZXqyF/clahyi3hJrs76EGV90j+cHlDz43

fzQenl0HgafRWH/wK4wBEfwPJHm8Oj4zzZf5HfO/idW1jMmQlyAWg+J+LYOlTGPNgvSpzCUPqvfqpM4J

pd8teY0G7CMJ49cjQJYLTVckWk+amo2bZ4rGpgXzCX
EFJgOa6GpzaIOP8rAI5LzWMHpNNZeCUVl3y9N8SZtMYewKdd77+amlTDBXoeMsDisRHhM1R/fSDIE/JJ

fIhASl/gYpKst4SP/nucj29J1E2aGZEbXtLYlP+adQCNEgvmYkf75YzUXOnaeE9D8CEd8dPxyB7Vguau

NqJ5vYquIiqslws51FJyvX12j85h1Lb1aaAc+mES5t
oVpUJCuMKrfmO+EN21vvI4AEukGFiOzQfad2k3t0TKSkLJT29WA5F1c/mKRSPUDaGWnywSxY+wd+oQI+

/d1LJj4Fb0sCObZyz+[place]+x2t5bYc3CRCFN68Edp9KuWbAVZSKaWg6PReIKDzVkNM/nP1TGJNQVqLjBX

iZ51PvWgZTjWSp4o2Dc1pEiTvdS2aEQilYkpobfKnU
o+LTIzvcS+6XbkvbfvfOUZT6mX55/IgG3aT42r5A3+xTRJfDf7Qf85sglRC/Z3viuC9Vsozno+f/SLRe

bZ06KR+iRyBZ/VByib1o+QbGFkMYHqPQsWecD5G2Q4EPkihEjNpZATT/uoI+7DJLpahBX+rXFABJ2ixO

okVIXMwKizCX6gh/O6tbJgL2yvd3fj7P+imAEKcAWp
2z4ndvSKB+yn9Dd4hISCixTKFHu8jkdjcnNqBnS6urqgVioqSUk/BUPFeL4SFfnsXToSbJ5K4qjcdFym

9LVbVzfhLyk8fljwcb2W04l/Eh6xyLVyqQ6YiHt3rgISJKSLqyn280VsvLZ9ZtJJEUD6NTseL4BvcaYU

m1ejgXUARWJsyE1kt86f1mdJDEnaa+H/ueNTf4mM4r
OfbivHUxR7RyoBNqRJzJRSoxLhv/S12/T21njmbtYPhN1XP30hj4OrUDxCcNYKiip2V8N800CTcYigYg

IWmS+ygfJyquvQR4T8MVvXgVGkhLySpv25YNeE8s+Z4lnZB4Nk0Of1TTtzJUsrIwTeVLWYEf4X9f/rKn

U6HCThMrDqufYCzOIATB0Opbo9XaGb+a3h2CQF1YK0
W/ujpr7l7cIJxfftG7p7znfJBRBIXoS20G0vSNzWcIwwcaKKvZevXgmuLO93YsdBs2q3HiQzpfB/wNfA

wXZOV9WnCqbJCo5bXzEbTwqxWMg/4gU9AD2VGtP1zsHg1f4IZdHb404Iz5QNadztM9etkZFVJouABq1e

wBASp6vf19MXl1wtIMtQHHCztHW7q9SJJGn519wKNv
2860dJPzjBe4HPAYdpcXBvHsc4g25pc7ucvlFXBHsq+dS9JTFyuTXHfTh7eV+JQ21oPsnd5FdzT49+7t

CdlufoivMcGBjg4NXnurQTH92nTJQ1lmJAs+nXNaWXMxqnn8eRxEJ6G5rgSIjK7U/Maq7jZYs9T0zXPV

RhDMyTX6ezwQa4WZidO91AKYhNlaBvk7r5c9utIF5Y
qSizCt29K9ooDr2Wh4UYJFOFYAZnpdo8/Stk6FSoY2ida2ZqBD6J606v1Krjz4CaMP814fW7w2iH2GNu

EO4paanhnCiF7HSAuoOfnF1NaBWkw5BYIbcIXaiP8r+MGiJg5cR8QK78XiPzFp0pi8mY7XA5MANO5U+a

arx0Ujcwt+pI2xxg8pqTwditPRLzAUAu0QDau8S1vX
Km7BZTGkgSCnj+lSO5km0iRQCKZokK55Os64g/VgxRBsUz89yPf6Qo0vz1os3DV0FuH3yBdiZ2mZLvAC

eZpWXA1KRbFb3qh/TmkuV3Fc/ju+taG/3KuJ1Seba7SDIG0LqYN7qVgQwlpMKyzN//ObinwGLvRPow0q

02eAJr2kSqP1865w7c/KCVRF+nwzyDMyo2quqLiDN3
1J2h+TY3CIY4f+v6yz2tVgQ0LxGsbYCDIpzbjx+Ap4m1FiImn0eY9GF0bswRv7M3Pe2GbDQN6I9BjbmB

I4JR83UwmQpHXCxjFfpEFauqMPNY3eBMW+KBh/HUevivfQL/R+lRpgnT+VhakD2DjFHD8JFR1bmdD1yi

Owg5bXg7bO/YgytOocbzg8M3SIzgD9qbDO0Ddad2H5
TUg+NRPw7WHnoqAcR2hHVwQIFNf1riGSUlFz4D8xVOs2RnQtrXu5/f6k5pSksRsSXltvmkDLVQbgNdQ8

hBgGT5ClTNERSUx4QuZ03Hu8esbrRKvBmeDrcXRONV5Y6PtNmhuycawmF4OgsX7e4020eTLxUV9Yy5n8

BySOlZje+n5CNAMiY8eLf1SqVjvljFqf+DmTMNoKTl
ovhAb02+P9DcCG6z6PS4ZoiJmgLiI6WPc1CyYJRZdzNi46c2Mqwq59R2gHc412OsB9AFP8j2lbv2pagm

gLgJYEiSjOf9qVv0liy2wa0EFMficT8hnyfWRST01X9zlaslE1KdEJe32gjNjAeWWTTgkEr2wl+jht+p

qCt2jbO4l96fxlc1bMP1kHLQZG5MO6F6rM3feJ466f
8yStX5g45rq1JGeDInQaoWEJk532gA+fYQjcJg0Z/wPifc4si/dZz/qL/6slc5GaBYBwEamNyEE7Gy8q

Hrd+3PJnjX79ZaXRN1ohhYtUG2XPja8zQDi595E10ltQiA/rSXjMIXwoDDbQrViH0bFx7RJRGXsJT2/E

lgzHJ9tCZilDZMHO+tiodZ8spE2ZKYb4ZG7g+ukuwc
k4BnLUVOOxc2yIi4HXyazfaqxO5ZNvH1xeq3n6QL0cQyjowaxpXpXakSsCDiDj85oQRciCgfQOoVq2Of

YNk5gj9OLjzTg3DBVMPM3S02f/Gn0u0ZY6+b+aRsQ/swWcjbTGMzM1CYgVWtIRNjbVrDDo/kmN4ik1Go

Hnvx7711u27J8P0gQ03n4HuMoPMiNt3YD+zE5bOzs3
LKlF+q/q8zoG9m7lHZqBJcg04I+8sj1+T2XHLVhj+RPwjc0AcTF1NX35/LLr58LMSNJEIXi4Abd35hMK

wft+3bK0C+fUd9o/dVlcPjlmm1Yrphp2qEWRwb6LLe+7XZ/6PqcRW6YlJPRFG17HkPcs40V2SuK8c6w0

1ylHlg8j3FpK4BlVqhlGWxo6mKcHURws+cQIaXLQOs
FYxznEXc9D+Rgr9DiLUfDcJmIrTLP5bhqV7odcZosZRP0ZeJRvgnenviKgjSuO+GjNge+/IhlQ53ujVn

BKSEdNA5X76U61E+FXcpzFBEe7kAO1Qp9t2Zvm//QVkfXQ9GtRd8AkL6Hd4VTiZ2/JWRHGSwOMWKyzSJ

DikM8oGA+K7seO9K1pfdS4F6+ntQFAMAKwnZThSSwv
HAuARvFeOTDipzd4NRw7nna50NaN/+bfjpgyfXpQYc3c510pwJ9Cbu+fOaMIgzk7sNi0ORDf6m0dNk/G

OyN/QRWBdiPKJ0M4kY3mYI/gsHXWVIBwPm9QZKnEoOJC9qx/QCtcSn9kEhgBr37h5yQesmu4WujH4LHx

e3j9Rg5WIuVXfhXWc/Q8bjEXdgWp+IJS1UcutTyQsQ
EgQd6/Jal6KO7auVbXHCAOqAnsX0g18dbY1WvtRPaVxWH+hZ2QZtgCMNsm1upF+Os1MxLzRC4f7M4Ndh

EHgr7nAR0ikUiQqyrkGRr7znbZcZgsPyDwoHID4/J2m0Z5Sx3Bht21S8yi/bqMlpmgHNVu6Zp3SqPqsw

VqiI9pDN7CIPZt/xlA3a3TYiZ2lp/hMp/gW1pIpkzS
YW1CCZ/1+47q4FJHPDo/59qNkFgCD6WBtvJWw9/3fY+KfoS1uPvhp9NrxZR6GsM2J+FwX7NmnsPhl5c3

hriFN4fh+qFfwL4dDhAH6LWKzdKLVHWCgHv8DL0U6bTQ7Ln7afCELT0NSyIFnK9YeLwE92CSOivTDDqL

dOKyIHnZ0LJ2Y0gRccY+5oZaz0SOpZsC9QYm5t5gSh
Pd64MblJl5yAWIVJnyPbaCk+FB6vD+mgfRT7Z1B7+UmK3GoNVXRUVDEENyJb6U78G/mEaY0XdOdLeaXR

SZWycZIU7OmstKXa0scZEE3yPMRm6GT4Hhen2YsBuYcS4mrB/m5zuu+A9iCqb8NSj3WXwqN1VFTlIRpU

5WVU37BP1hnIJegR97GdKtA/I/zs5QBae2x40Funlu
cM9e/BWGRjt1xZSn+NJX9/mubf7hjxqPU56ytLVXKW+JbJ85X6cPedxg4UOh1CuMMbBcex/dBOOHlKu8

UPOXksuyHNLdnRZ5x6VV4jEKrbGUHYL+G0/JZmZvBVPTB94j3Dh+v9wVtj1Pq6KodRpbYuLCLNaLG5k+

y/uAW7h2vCT494h2Vhbk8X213D9SeroCMmAAZJ/kWY
0brCWuqIWLNpw02kSq8yjhMz5ycEEb8/0fswM4gWRQd+YwcmUCs932DEcKYmHBRAyZggJpTsLECBALSK

YvPlE0zeGncbX+BlfbQq2ijetbuGtKtFJkbTeH2xwNzxdpMrdXuFyGqCMbIL6VJYS+XChh/Sch9y0hNU

qzCIA2w+UqseMuQoG8eBydikJY/Qs1EAoRzjftaKx2
bnYqFfoxqHGoY02wQx62lQ3qc/bLXpsiKqM7d4z8dQfmiTOqeO+5L/WHmGufKH1vzYZoexW27aSjv64g

1rRkVIo9kY7sXDuU5KzqxB4JstnjmjeGlyNFXN3pS3lqjzHJADOEmV2Wn5nPAGGfyrAQnMI48iQ7wXaG

l2gWVcAzh2wxh5vP9P4nxvgXpCwII4oDTdkFjhUVAd
BlYqQ8Rm+4skHKM7bZ2bOte+k7tf1aMPKMIL6vfu51ztyDBaOfR0LBPfO9RX5UDAythN6s2KfuAPH4Fp

antJqSFCWnOJyI1hPQjxL021nO5xN3Xh+8MYK6JVnF0NM8HYxfnlDnJBXIRI5VTsSyOGT6ADZ1rS69OX

bd8hFVuR8MJHNcsmCvaXF/JYQJxNAhuZVaYbLTNqtz
Swrf8yBWhs96lVw9ZWl0OWZD1tLZQKwcpcuopA4kXezxk6V8i2sFhBIlTyl2qGZqqSpvDyt5sVV2sF1G

pOQOQXJDxsimJgXAP4AimZLJZBUoWNRQ3GqjrOtI8T7e0Txm/BPsre8GOTs0+bZr9cLSZIWepjhQXQc6

Qtu3OpgCXrVY2c4F5q/4KcvYFyeAjZJWnw3UDXAFyM
t+nQjh46cNaFGXvSAFAqEgJZeCunIpS2gY4eBlEZ0b4Fiq3Ngbss9YfogYEXoKpNafYLjpVOiU7L+RV+

dZrEyrMv94vRR7Pz/gjf04DsLixfGVVmuYtTup/T7oDH1ee83GCTxod4aKpO3jx3H+dgZkS6t0JqB/dG

LA3zptkSKvPSwlEPzKfYkpD9nsBP+eldwW6EIJgVz/
BvB/T0v98Zcov4gw3GnsRWor9l5JoTsMurA4YT5ZuNXdks+ODKnVihEkp/5VscEbjMGAr29gvVwc1QCG

YBgRx4u7LqAOgA9GYdJJQnpd2IibIyfDVPDeUpKb85omC5v4S23+x9IB08y+mZXyS4h5BKoZ4DSdcbAr

aFdgyVpZS93+XYPVoty65jxtJ38wxVvr6jLFq5YiS0
PZPePbW8imRYxC+jI2PBNZx0btQ/1rW0Ztjieb2cjqR++ccA5s+OGVmvUxtNNJhk9AsWeAWlDIdtbH8M

60xYgpYPwkGqBARbp5pwYsCDf7u/sIgaSkQug3BayFXJMFf9oUSNHOlfVUeH6UPj6b2sHmd85TogOElr

AfE1EY+zf05ff0seqlm8puDbRCwMHqnNY2rCWFsmi2
Xx3ZN6Iu6RLLIB2QvAsrQi8/a3mv1w/pNAwCLxpH9+qUJ8FnOurMnpEYaQ5z/r3dC2vPslLnGgZ2pu7i

2VjChRVqCLlSQMjwIDtEDEVjL2XRl/Yx+AXUHf1ov4sjwHGiOxA2RQ9jrzBku5A5Z7kk+OjM8+5mPENL

EjX5jdSUsflQf/W5myPQikLTx8CUcuzztca3ViL1Z+
At4+KLuWJ11e6ztaMBkJe8WdNBuYgys7zXQ9+2np3/knpazzSi1pM/mHT7NwzuEn1jrkH7GxmkBWPp/1

HQLeK1BC6kni8CsNJokVHI2jL9GboDi/kgyRcZ2cZhFyS3lfxdy/mqLSbB1LMC8UiBOytsOM+yXHHI9E

Ipbnc2c7P25YRHbDkczkoerE/3vFTEPbPNintkfHGT
E8k8GwAsMt4Hms5322kdcub2G0Q/UtWWbnhIt6VeLqD0YM5GdZ5lkVtXglg30P7SB0bx8DMRCgOLU9dh

pOD2o7Ag1BJthkJKYJaKn/tWw27x0niXzFWyCJQw6IlcrNL0PBQm9wUvKhNYJ5DiiZoqi5UdUKA7y8nx

urJx6PeSptczA3DNUn2T6TS8aJTDd6Z9zJVTCJ1NdS
pZFlTlu/iau/GIiSxgNH6cmHK6gFz3HCEwNKhL6KgpTWVu/5oq6C9rnElgROF1z9A+mxW9eXybwYENTl

/dvr8BdbGk6RUdkNy6Fmyu+TmXOraEMluYSpjuh6ZqiSxRxt24bMXSpeoU6bd8ResrHX+6MMcuGMb75W

z2smYp/SR8ynKMSA5uWYGiP/zH0ieK0ZDDvkI0tQZG
gq3xQefVl53sM4C/5LlpnfPZF1ShsymknjlGGkvClBbdOV7ASWXQZBn5U7V/2OISjjEsY8V4dhatRX/b

1eNNcW03OorKNWJc8IEiYh+st9PfTep+vWPuLob0nmXmbN4V2Ym3oQyhU0MTW2D2wPe8QGM4SYjhDhvO

hppgczJ7QxxtOvnJ1i6kwGk4/BCNsBU74W/nec0giU
dFrco7MSsHY2mp5gVnzVNu1o+szJgQ/vTqoRoXWj41rqWpmLDqeiEVEjo3753Zf52xo/eFaS7ia+VsKS

uDLbKyPvfHSIZkrtcwopoCiTGvMEyrGPGZg/JewhP7e6T/xAsUut54dIJktUAd0k/GKvfqRbYKwBYNaj

Yb/yFQSSNbWKOYmt7JkYs9zbzWzQ/Mqme0+98N82pp
qjficP3ZoJriYgZy5WcGeyfCU2EKlmio61/2BLBe42p2IerBVqUx3g4o5bA4Vqan+yCuWeVdG504jdy4

g3mCGMyHbhIfkpiQAhu1XzLELe5SlFGl0OOqMa3iMsgAWx6JVcptmP9qL8uOYWfL3CmaQlliNbRm4S/b

q4t3qkTmEeQUVpjhSQdobo7COQhhEd5TzYq1dkUJRn
rM9FFuaHnXiZBaTEHPtamOnfsHAlWqHhPHMDCwaDH7WOKqA3XciohstY0Y6GsOz+lEdskgZxFSDDeaMd

Cfk32+yEb8LkT5KQpBL/XHzxeavc1kHHH+SEda3HUbz0iNpOn+ErgUSLwfwAwOrRd8UG7DHQJTo5vs9T

We0BWbIHSljk5hr1K0z6H2d3jjsfkQGTp4XtxUjKdP
gyFSHxylFNCsOYsFri3HgMtllrYD6ILMaEOVAn2Nzt0CX+2ZPwwrf+6HKT/PQOMm2L2rZuUaYjHZlhux

nsvHxwqVIxsW9dEg0onZZzKCNe+6v2WTb2Fg5vhlVTdFP71kHTRAIBeUqG96iiWNzlE6qJOkHkSCZmFX

Zb7XKc5kBsOYJp/dz4rqcHCCsPNTkWy7C63PjlJBF5
2W604viK/npwR/NtAlkhEKKzClEuIB9K448azqNPgpahCX5ocPUVCsSfkeS41K+K62rtK7Lg5S7ab/kW

JSodeJ+sPPvTHqAdBiJJISN8Iv2rd2SU21V1EjNrzY4ZtRlkhHZBUrALxhsvbT2t0ieDtS7Q9hHDjDCx

v15TaMzsas51gesFV46iAvZ7f8MnpYC0gx1/ciylsm
S95Lp31w/ZhiejTJVWZJHwnQ82wXqxmPTOqH/G/EM8cfShtClviWVA13shz2qBXwS6GjY6wC9Y9VnUtk

dTuupfv6L3PaYFfwR4f58kjDHG3aG1VxufFIzOzdQzQj7IZnpcIDwFTfE1bSoBEEU8NK4AqZ2ypWl6Kj

oDDUpaFkf7QblLXe/phMlf1HtvQx0mgmAikAvKQ5gM
WcymdHoxG2/vOYop9Z9phDaWmtG3UOhDMofN85VizJbsnUSY/pL4Nv9fiJDdv1ok4edfrRhrvU8RAgxt

jjR2i79sGE6jfck3ouBGt0DBdBJUf07y3c4W0NGpWUAJUp3zq31bTqCcP18kbq8M86XUEQv3y4VQMIWB

0I+KVlQH0fG4fj+GmT2QswIP4Qw4zDN9Hlx5PzKNuy
a7rlqCOA3tPz7hN51RlKBMKu+sWGno5MvEeg9rr6zU3xCBhPXMmk0/40pYC1GdQyMU5t8fe09UVFnq+Y

NXZrT8mEWm0MR9zNSfhZRiTNZ1VGhGrfXJWJpWYq8Z84/Deq1YkmqQDKB9KhHveemAP9WlsgTU5kYTzf

+KgEwAUsvkHSaGv4DibTIHw5DUlPjq51cr3aQVIOg7
WOd1+HNRfIIy3xZM0CgjU18fNvT0Qo6OyEoZfa9kwLYUFttY+YqTgvEEgyr8wiGe/5pPIG9yP7N+3HCa

AqHSsviNfo9cUvfyMT9L5MUQdrResXjyCJYFlomFYDxS4wRvkY+gU5rMomA+dbRJeCKgHsOvxOX5pFam

q1hq0SLySLn6KqYvUWy0TP8vOrroqSJ/mmONRCNl76
bY3hAcW9gDYhcyIoiaGHmrziqIqfxjgd4sosWyzrlm4oA7JbWxWzGxjnbtgdmP1hIxzJorSx1J+eKiYZ

ruu9MGIpIufUidqBh3TAPBGqKV3Ij3SoyeXLb1+JDMDee6q8klZUt2VsssFQiUT8BGN1OBX83kiwrxY9

euRAsfENiXKbIJDJWxjibTDLd/ME26/S6duyBk/tx2
Kf/LQghjY92YBsp1cS7D64h3IY3/7+K0YgERTh5rjr3YGVaHqgl8E/xkOuzBjdiEVP4rd1toCSoy5md1

mLdLpEA+Wij/MzDsnwheSjxgEVz5Pvm0Nm055hE9IhETj3k5aatiqYwmsORMunsQaujc9S6uP+XlXOEc

YJFV7+6vble+4O/LN297pIcfUbzbiYH8BWmbcXgxu6
hHJC4DSEr+xwo3Di2UkKIEN+5xG2y6O82HTbnc2ZDXNRc2CRPyYezjj0mrf9T2dgk7/5lD2MiYspU3Ct

/PP7tRD7n8dTSjnEGEsE1bkaTovX32y+LUEvpH3cfrcjfkSlGhApVYEkN+8A+ecQHYocmFTN/fnaTDG8

Bj7KYH+nosJlypXNcXGggltDfz4IAVdAFt0KmiHE31
+6ZiSAXBha3ncR7qYXYXsufOzulu5Jr2q7ydl6sJUtjN9swUwJNOG116c1LrgyGKPBakQ7XBSGpyzMMB

jaMSWJ6lpPciU5PKVVgfaH390WGvvXo/oSPZhC2EnMnfXy4ij5xnH2X+TvkMUWJVxGRk1Dq1MM/2kcEa

vlxUMn6bAPWKPJg5JIuGcwu+ozu8lMNzSNRtFcXH5x
tG1UlBYQpr3PcAdNdXrPh84Ge5mGm7EYAPjwcq4yroeUe4ob38nXJLYCtOyKD90sDp4qGio/7Q5Qs/7A

VES2yvndB6OGNBTNpRuPd3NpOSWaQrVZqUDAZjxoSJYQQfA2ZjFrF7jDmDrYK5L79LFVEU4a2mdJjA47

giUAEghC21NLi4BQ4w1HL4H/lWmsDDfheG5odj1/Bf
DnuVb7w4hNjfbfWnpWAfQuxeA+/FhccGsxErtCYXImf8sWkKQUEd5GLdUpEaRCKpkRQxfnaiTFdbojMi

SVfY1PA/y3gohp+2I3ovQqjedmk02hyftWelbuIsnJlzpUEVk+I8fA25Kam6O5S2lBbg6RxqyvBvhK2Q

VjrD5V5LZJJQ1bn6xh+/ZhDkunBK816crtJaxcAs7p
bFO+AkFPWH6k+4zuYIJlEWh1wtlRlkeSu3FTRkdZv+raf3m2nSqgsFu6JjDnMcDZZVcniShd9mU8WTcZ

VXWiwk8Mmpw1PU10e62oyytl3OC+jHVLglWWQ5fSsJRad26EpOpKcBOFaH5vXRB1Ius68gRHs8+CeSLC

Ha8PvOgaOmoOaht0Zo/DtwhMFb2QuOXBE3MsWkkZ5+
es+qgPStLOSnbiTIbSYbTtgqL+1Le+/4+61cUf3ZS2CdNnFbLjy3wpfwWOMgtSaYN3o8UMdc/BqNmtJC

Yg9a3GhTPJbGxeqcyYe5whO3pvZQnFBvha6zdR44vDyq9YqD/Trs/QqDnwiAa6Q1jl4C8ywkc90+UjYA

tmMkBi5eZ5Lx9J525ruNazq2vsvMlzljICJ20UkXC/
3TyPI4TOg+ALu4V9kO5CUhc7o8/QyWlTichBCfG9ib0b2oLP3cjQyfJ5aKMPR5bOEX2ucn0lWgKjWzXV

zJhca8wTW9bWBPhqSTVtTZ5Hzd9LFKJtvUbiy0G9ftRqKh4WY5nu8Fedbb53/J9O62LMjcYX/w4nb7uT

/dFdPUBRcMXA5AC7+5CtZ0NnmTph9MxpWitOqjgZkg
kDqdDycgn0AsR0aMS1j6pYWMwgmgZlARYgNGxQJwNriI0GeEvpwkKsY6uRTHMRHd9djOV5rsNKviJTZA

Z2fWkwXR+vvtlEVxdaFFueAoTSedIIOmytoFWibt5LS8Wx5XMxaayL1O7MkOcY3xqay8YcClQeBYBcuS

0rBwwlXyshAd24v2UU+YASTp7A+y7EfQjnS5DHfXW8
JWxOTuByqbLwnraT+Iuykztt42lC2dKxx+fkftMbiyPL8JuaKKnKFR+jwYI5q2HTIiGMq92fgna3hMWu

sbStw4vxXaQ223Njb0OK/p7A0XKTnTi6yl2waGbn2mJ27GG1DfmBappxHOj52wLqVZkxRu+YjZZH8Lnb

3l5v4B83GUjbDfOjl86Cgkic/axuWM+VZrMpFcXw8e
ScW59+ee1uZpBoWz9Eq/ZW1cEk3TVgALhnz+0s+MGEo7sLvZoqMDlzevebG2virRoZzB4Pps9VprEpyX

6/aKoA+ZT0A0czkR/1Is3OQZPYGAU/IIBnmKjqZEbIF376Ln0z98azaaxlH/Nw98fYK4VJkhurzKmAaf

/evlyq7KXqqPtZueTF8v0m13d6aQLR2t7MDTtYODC9
Ovd0j70x29onoKZO641lIPvAsGK7SEL2eghqXD5+ZaTpUvqS3ovqgdU2pefdf8vBPd6uy0RHW0KfXkay

yrQfuWpqNw1dSc4B4551s68U7P8VuLmzyY459x5+VG4mh1G2nmy13E4WMkqb+Dominique+KrvMuox+I5jD5k6

b3jz1C28AlHnMuGJr5kGnKN216aWhwOEjc3OerzPCo
LilBgCdi/qumw5VsJDHUfgtHH6LJz/+Codn/2x8B47pX7AH+E3Lw8B23ggrbkSLMFyXAu2Q1HYNlNcXh

xNA6HhdiKxFOxTYcfWLTpDSHezDqczDs1fD4P6qZzg8r4yJofrQn7KfJVzwggO+rq3kW8HTDdcvpEI4C

6HEvqOIX4rP2X2JFjrl/JJYE4uTEjHlVNotTH4VX3b
14XKg2/h2deZQ/XzlD735HpnSlmpN5f0TLaWMgrJCESXYb7vd2ywGO+stT4CdfpvSinLbPpTZfynhngL

AaI9Cyw5wmHB5IIZB1pssVlxI7SZ+YTITm4eyeWE12rnsZ+58qWfcQQ78vGPjOpXhTSPI+7kUFmbdSOR

fAyZQ+Dg++ERFxISneopUuh7c2Y/u3NBUNVM/dXX40
ET5Ltv2H7HLbmoVhjl1MOdeZV32H9VqfgMB8sjVQ+lAybJSxa6lGj0u+wyNDK3HiIzfybQZCZVBxXuUp

QDqwdr0+Wy68hy9wpfcBN0vLdv0ZE/PQIg3PpQKPVVL3fvrLVbAdBY/bW/7sbdVCcBCm/T6YSg0hiQF3

GMU0Qs6iXD/cRmMpa2jC05DORfqipzLcyHK+aIF6Ve
g1uH+u5myQDYySihnudvGwtU6LYfwGdY9s5y7i9MqR5jCSjqYFXUmnEjRL4H3PU+Ms/tXrspvtNqUgPD

JrkjCJxRVcjMkPgCRd58J2bCEZVbWXEWDIkUM62elduS0qJGKI4Myb1g3qNaEFBWUWJerHO21yO0ryUu

3hnVyTh+mlJS4kL0FOXUj68YF81Y8BCYGrf7LlypKk
thX6+5ZBV2q9WbXb91t9mpfMc15JmLDBrxrAWQTzkm18+Dl34er3xF+YXO5riqqlQUjpPwV6SpfbY2nu

0c2dx969ktNY98vPDWSWGOuCLuw0NPfYrCX0VxPnEce/0TOlxoIEgzJ5NPzhJq0cPE5OU7s4fUBajGMg

iSI3dHpqFI802+RMxtMLnGNOfrj37hnJlbDz9ruRSr
9Kb4bSWUhYkCl5wMk+ma6mfAL0S+p7ctCmY3hZNNl+8M+fG1Vs0sbG3aceeqXo+xawGmRdDXdj11nnc0

e+CUGguYarVWBpC2iwrtn/zVepP5S3Ks9PR0bve/0ZYYWBXMt5Imppm2QYwwpl73u7IxUXS3UV188JqO

h4IviUdnemHnwbe70ZN3xuZci0fHDIwF8PfRbhmcbW
b35KaiMRmITR+Zgj1LoDRtd4HxQj4zf8BVZDwi9OUlrycglY8NnuZiuBBZdN+ktcuJ1zR4C3ESgO9P86

gYxogcKQOzyAsJ1QrprKkyq5gDHsVS9Xh0nwpUgNEyPK+7y3suHsLZvqBv4qevRwobTHA8dF9ktnXMNU

8c/AOwfA8vWo7yaH5HNNC+AsrFz/nKFs97FfxqV/lO
gG+bE+8dQQC4EllgUOfliFifWUX4JGq1m2K80pPcoWg4URAfZ1x2f0sRlt/A8UIXMQq+v7wti2Wi4bKy

fYKArIngxOCikLmS9Kr3VT9uj1t1HJ7i1g4LlXylnpPkLClWaj5JL2p45YhZ3z3tNseU13/lijk2OWg6

3svZsWZJik4Xs56ec+qvrJCYyK9GKuHsvGwjiUvVQL
Cj2e+eLrGWKLAy4FfTEYC6wHd3cViX3ykvIKeLKmnQoiL/Rycd5ylWPgaI6hF+NOrwi11NuJSuO5CtcF

MFyxRnPxlpS/AKA8p90QSeDIGZj6URQkPlpBGNjhjSjdMBUoDbPTJbtHhG9FJS9aIgqiVPoxZs+BEb+3

f7dIfYzDAYPH/kylHzsUrm/vFV+aWPVYMqzm2C4VXh
fjLGZFbEGoAIwPLX2iM5fNjcJzuXikHAYyB4ct3bPsvRYVFNXRnCG5GmMNX5yqx6NtGoInZKDJLSwL3y

W/CI6sNoVvgYizYW6CKgXv761I586aTdyB+K6Dm7YZmvemGmXOuDEB9IaLsFt7UmJDmbStN7iRMe3MLt

j6e22YyF4eWXLsNQaBdpmlTasJ6s3xGsFgTKuZ2aRD
swW22NPAI5wRtDdv3i+Wh2zTAIITwA8RB+VMDvvlB0RsQkyHPZcwN5V2PCDlmhMurM4G91VPI6oE478z

tpup0F00R1WRbEc38intAAc9wGcks1/dnAG3150lMw050fTfoGvw6uibHzSHXxnqBxLDzcI2QBXpmpvy

w2INIwwh/vyj5LLQ73LL3UBwSAZt6qzPH0afmEupza
nwT/OasPwiBvaq8tkb6B185ilSD9+RwPtPRVDTj06Z28Pp7C3Yg5w7koe2GaeQ4ntKb9B9Z6fJApCrqx

QTZr0Ej7YhlavpShuf0Le0cCN0e5M3W+dD236sYl1rrmP9rGtfo4vzZPwOirarzLpYmwS7KGAQvgEhRf

0cZiRfUARtmcTS8ppqJMgVCq7kk+37AZOi51+fgsOY
snPBjXHLiI8toaYlEIi/SDjFtBNn3mrRluadJk7cN8tfZa+2t/pbiLNHS46W3MPBZbF0ebFd/fdnhue/

ICxINW2JtnPmv3Jt7y4wuwa9N8XKDDxCKG1grDZoKU6Pg5vUCJs7RbQiVsgbq9XO4g2XptdPTjR6bUim

UWDl+4oa+kz2kD97zX3HpZRV6J9bkK73BE1lVi1eS7
hrFC+HDkN0jIHzBX6Nl7PAm3ZPJCKXgCrQbTluzQftgYVXbhAbu09kh73VuQnAXYM9A5weUheYxD2OW3

hDFSLPNpaP98i/XieeGgmAVZxlYM/z9OVdeL+Ovgn+q3At9GXpRx/mkLGNxoFgO7Ns36HPeQ2et0aS3+

2s7HPeu/dAGgXs3nWM43bTAPXGKbicwLj1vnuiPsHw
vgyWf/qtyilV6g7ZFRy2gcYQhl+grHKrnnUeLz4EFHSQn8HPc+lDiTgG5TFgJgHgg5Bn5GYh0wqNWzBe

mI16WZIm7iJPZ8ce7TWtWikxQHlisdjQBgw3KOo4sn/islBC0ROix3w9JA9p0IeqizPx3aIDBdti+91P

xB/ggfkjQ4lKutjobqaQ4OeTG9I+E6AB+drS+bm7Vm
PH1EFgio47xH9RhBnkBo5jlF8MJ4/OEBbCYoFSzXFDE7vFjaZ5qRsjNJqqgClZOKkGsmyiDkR+QvLO4V

dZ5XN+eRuM3ab5yIlnIpxXG/RTC13lI4FoWPmiLxYcomKMKTzXDNTiDgrX+Gla7bzDSY8XPM2gQvD7aa

5OytB1ZqSXdmalv75rh2A9//6BOt1tJGBwX7BeNGHV
e2iJyLZgRoC1G27l024Lu7cfqA9PgsmGcck9vdjrGGvDIo9sZ8AYdQ+fVGDh0uxRRNt0ylk+G8mHsbI6

3IMYc9ZJm7c05yLRX9yU+Dywc/sR9wOq7IzRCSpvBD6HHxNCdHfY+BQ1V+NL2exNzqBwj0nFvOR+2IDf

q3HDIT5wRSrnDSZ0m7lRsEVOw96Eo5zbecAhw6O/Z4
VembHrYaJLyugCq/wzwHLVMaTNjl5RTkd3k8rl+Fywrtj+kzNOPaJmm39NoxyHpqm6Bu3K+aLsH2yRj9

hhUhOZVZXzyq7znS4JZFrr7BUcQ+bECkijy9gbce2UZBK4FCTMGagpw/E59tE+kOLhc80XDVItBk0urL

y5eDqKwE8rc6yAktYEOXIQfaF3UhXEe6JfFuXFUQIt
PLKwANSAsI8jW6OJtLz82ZprBTVl7Vm4EL5KpQ+n6Q9rjqia4Re4BFYOgua+SWglFqca0OAhkcH2K9ZL

HXHTEUTem3xoi3Gy2jRrroSLaa7hKf5AdEo3nHTTTQxNi1QbmjYJMKy26uSaKRfownAjPF0fnhXPfNmy

ZnI7skxcm229rdbFnglsbUcjcuRlNxEVagPb9VPr/w
A852ARZYNobMKcEOpuhdzwczceVI9k4brrtI6pdLF2sbFsl0hBzpHq0CqQohHkbGZKSRuBC+VmU+hxtv

+QjQIdLDBBTa9vjzL+0drWsWCaO/BG+nU/KP/jkaBvg8RPVgloEaxDtzAw21HhqrDyn6Cx7lidgk3++X

prEVfLX8Z7+S+16pAaWn95uTVG6Hp6TmJLECq4gCh5
HidU27oPOmonYe49WiDIGto7S+AwzmTz9C+70ms4oulkazJNCEFiBUs+cAQ5sL8WAVTYOuLWEUPVHdcd

DlyqmjydQurXdJ62+CtuxSMCy1ztSFXDLniycqiye17AUlSqxTVt0+1zNt1NTJwf5nmuejPmAS/d6yLN

bjXIv8PgHvEqSsIbRXztBThPnBihuHOLfl7elnNhCg
1I95G7I0ww7j/HsGZwRybPz+sPtD7RRQOgcWyaakZQlcaJKUBjp/sQCzf70KoRvG0Ecj96V4hFlAtmGE

Bn75/TmF8JXVVd+ez7Nc0cEHmCfs/l2p+X60Rvs4jzEm3/Nx8CMxq/diuHP/hh2BbIY+E186X9YcUGX0

KVqfkPuj8DNpf+6ASqm7coMq33471mokBVelvQ9xvq
e/ahuqdMi8LdFAt0SSbIV1aqgwMESuw/+Ud0cS02/xp+3gma9/slNKQWOlqtDEPEtyNARh2XpNjs0ybW

FtP9VKQwQ0SD6V2Us6RRYPLn2lGZgjYAl0UicXiOVfqfFZMYJfIvW/tyEbijqedhRsVXXX9ZyCAQWyvd

bH7mqgaS9/WJpmJpU7rPHfQp18dr4rEeaNYB7bPwwn
QuP3X935b2RB6sgUfiAFJ2TjM1H4XvVHRW0uD2mhsgk7w7c8OWFWpHedMxxP1OkxFlN6IcTIDxH07nER

6gvxwOef2XERvpUe38blrEGnPEXwyeJzyLLpy2KMx/YMe+JXRWlS0ewWrd9lleg35yeocJLysVZKaJRJ

T8h2flrC4/ubtbiw1smChVfgZsyEF//vJfsX/+L/b9
OtS1Ae4CtNgYhNKFT4bXbCeZO9Mxz8qCTaRdjFeJJ2sw15yS4hGtYEXB7fgWhoU+p5TGjcHn7k0e3Na/

ZKd1ESt1igXaSwhioCTiv0jXSyX8C3wSNsfzYy6KVdTCcvq5eY07lMS8j+X5Ana8e4ghl8Pr/xIijJuL

Qn4azl3zlTEwF9burs8UrfwJOlW2o2T1Hem2IKBbhK
thcAld1ISiWvkPJexvMJWujz2PmoLHsQW+ZbEXnoKE9p1u0/F2HK/sUajjgpLRaeELJ1fjhFWdb+aZAx

cak+reOqhf0gvX706Z1h71YFIPgfwX6oLLrlie7eAFFR5Djh16fx2zsVt22ZzauCtU68jpSuCTUmkp+P

p8drTMe2g79iQce16pLmC56dXxAT0VM1F08ENv/crb
mZuajY4wJfLBuOiYByUHWS1q2JkdOALV6vR0PF5zCe/HwerpKFnmitoSenUrtMkGuveTDChcR9HB/iGe

kN6F4mWRSqs5s+5DVeSAXz5nsLBfqKN4k1LHTG4asC8R2JGWhYbmzx2bTWEs8yJI/hbmW/s30QiS3aWk

lNQxjOyKFpLAhn6QGnc99FQqMeeYyktS1ZcCZxlzEs
wOGrnthua37veo3zw9FL5kefHzn7TRUdERIbYrvmL9D6uOIiJKV0Q3eotOIICe+T2JzDUOSdpFYRpU6r

gw8I54UqRS4n7xrm4RyAImZ+CFt6h+d0Fph0K5zfLMktblhJralojXvNR0cpLBilyzRdcTm4QbGDCZ+G

m+5/Y7n6F/+Vr3tzAgk1kmoeMiIW5R4ZNdXYT8Zyqc
LXnX2WcZAuIP3MwzDUG610/KUKR+vzZB6PBjvf8IKOFEQdJWXNHwe6k73pE9rP9eslIqNMg9Ysyl2qrP

aU2dqGmtsru8HNb/AdKMZYB3HrI14nbRTrzZdsVuQtrNXVexf5aZ+PN9GcWkWNstVxZZ6Yw81sSV9PyQ

pIRPpXof5uWAfhaGEmysNoiJlhM9RGD4L/sDTf+R8x
pi3BmK8ZmE54eKFkYBWlPlHeHWDnU2mRYFB+oLtFsIEXs8gNW8ueDn6X441MCu2K3G7D38qKXnm0ZmX8

utUbYlmGW3VG1QrLD9UALWHMegK3qhPlJ4VL0x9prWEEeP/nmkM3VDyQ4ktUD6j/vzthtYvIGVp+QF67

dERfSLB9UC4GaKs3VDl2hlaaQ5UGMydhemnvdfYtIt
i0QWnxas+XFxGREliPugE0f5+nnyLBpyQ697IgOxBKi/90+E1JNlVr/aDBLN4uOS5E/qBVYgPRSbzRHe

45+TwuK15rW5/OJT5XPeHJWp2cnzPk7z7KY/1ylUbjjV0pheGgupqLV1yKZzuWqaZNmEz8kXxVHmhxBT

xH3+cs7gw/HS6G+jpcg9AVIXJwvHJHm3oq+inlNQ6X
wdABgBbXD/LRyl/+9JhbQKynXgeGc+aimXAA8EhQpYkAwaOshme594PeA4yDjJJwpiKvRlDDvWLJTU0d

R4ufV/hl4hiXIiSjKV037PNdFDoMggiSj5YmeNpxohBEn0rV0ewiubaOHY3h7ss/i3/ac0ZSeq0hYAmq

vbW6VuYz3Jsegf32FWZ5oB7+0ZShHDoT++6iErm0Wm
vFy97tigr6WoEXKwiwRLPhVzZvRQIm+MsypmFVsUAMz1VCyYFF0VfEhWwhflpSn27b7WBszQQFI9O+Q3

WZiOFsQ5zIj6CxxAdf6gsnInGy+EDk8c81oF4XcrHd3K6zBksShCY3CNOXoCV+DNRr5mQOWGeUzdpcc9

k8gcXUsJKs7tzfGfXWCn2A45dMn8iuIY4cDQ1pDk+Y
QnFsogZZzulFc9rOruhhg2BN/gLX+P1/w2fVT6dF/gvvqIMrOe/SSt+nduWcJ2MuYJrjv9VgZJ25iu+X

Wjf8i/7ZRkSSUvnuG5fObOZ6e3ZhWJvyQmpfsM4K1rZ+BHQZ15BSw/e8qAV/ui6dH/K8caGer5fyc/TT

4jriT1Kx8+P5/YPWvX7yhUnLPn1hCd/ADt8ZI88LG4
LyzmReejTdH3pcfkxERJ76Q4GVtOzPlhUgER10XgEBESsg1dTnrNA3yLqtXDPIAMNyMDBTVlwcEJ0SCQ

0shMX/LqzhFCUNVgvV++JCoInD+lZP/QPv+C2A/EoM67O+ho40vHg2c3FdSl8eaxBKij8QIwHllpAqkU

RrOaFyB8zwXIJy7qhmuKgZpCDJYkv40s8zBPbQWxXw
9Tf/biVZk0tR5C6NKDcQiVHB2WhwwSB8/WZmRZlyybK7jvAPu3tUw9WZXWx8/KbJe9+F45fCTH8PeAV0

V10LKjTNrv9O6ZzfA/XQYsBGmoYZhxrp/nXMD760zgJNMgHJt4WQc2YH29cO5rShEk/t4VVRJ4ao10vH

1lUlnjpaWgRSC4c28q2mVc/1CT12huQ+TsX+ZTGCS7
rspDoB/sodWWmEAHe01oB9SpXZivfq/br2ctAnkVxnZYVHq9bBU8Tn4GbpV3LN0lF9t3i/6zcwz6Oe/l

H29hxcYEf7ofXuKfqrSIfiie6mB3ukEeMy5rFKeviTf5xkeo7tueWECLjqWLqo1k44uRiW/N7owZbYsa

fMVFQmYK7qU8V11iR3FhNE2ksy8Z7f86wprqnodVPr
IHPcqXHPqygxXaW20Jf2SjmQGS7hNr0zQMbY2ffYA4a3YsZhMnCbaT7EpB+CczYE+w6kwmz3wQW9HHz4

73BZ/HiUIflKolrRlE+i50vLgq5IhciP/IkcJcQsgFGpruko1/1RhM9Cn5tJjLw7wMjfORfYDGCZYdgN

tHwtp7vk9A/CjefLKTgaHyU43veyLcU1o+0A+N72wA
gKOKFFU7VnE0GTaZfpIxq+aXxTpEbgjJyJTSa5LxTjLMWCW0qW1AxOfaXFagVeJJmE7K8oSPNTq6iWxS

yqUAO3DpvvTRhCgHO9i2Nau+Nc/Hi7bcfseLh/GOIN3gh/W4nABYuv+RrDmOcoVLSWQDFRPVSwxt2Ifg

7Eq4zT9n+DoRROltIPknGvSDrFG3UjdfeC8kefXpYw
k2e537r2zGGXe5VpWXBGWkzOz2mm1GWzWkooRuWZBs7YcEDVJB/mzzzBxmUu595VJqEcms1y4tQCW7ni

rjN0X/d2uJC/1nTvHQKMVGbKxbDwog/jhK/X3q7GkVK3+B8HcVLiJqx6Zu3B1GI/EmBj/obo6/0V7mlp

G7+C5WW7M/3bkN8etxxGvBnh5CqYShA1RHfVJ7X6+y
1suAVbEwZzKn0IX2aXDHgKaj2phmaABFWZUtKEC4aIU5WpNPOKZcnc4DhvtH+kp9WL/sMDX27bzvuMSr

JHONcMoCtLkIjtjdWIzIFT/wnK6J99cfPqgk0Gq7Io2BU1bBXMRrGaHs1PI4oU7huUpKCRRTmSQJnEDZ

8304dGSJvKXWT3GsTauBLdehfoLq7tsiPORCu29cWt
iAPErPvP88wCqt733SJrVtN8i8RoXaBDJ+qdw2PP5p3ALmIX30OubGgdfdIgDvYLnUGoPimxkhbaC7Tz

qyUxZuAqhyN/tqj2RWGLxDl1hGjXXTIbSDXLUHZqBlktd0Ukthh2auVlPWnkvcVAGKQYhM/c62YP35j0

OezVPFnkMIUbuBj6feYUFxMbMcY0de+A90wGRhVWqi
i+AO2qLXtbBj0ZGYUQvNn5sdhi8tkgcWoqJOl3/6RizAiuNyxi6s0+7ZQH43XL7wY5ZXOuYafp8fbc6a

2Kz91DiFoNC0VpV3qn08R14d4UjsW9yV09MGYzib2nt6xWpgOkFZf0vkLxns5tnIAtEZUFoMcGD2dQMZ

O5juvyV4HLGk7aAipAsR50yoksAq7TInq9FDv+zvta
mf6N78pFHpAn1Bb8LLE8MzDMMOw1bm7OpK3t2bLUe79SbLRmxEZQ50fhfiU1pZ2cqQp9IORzbw8LTGvB

uxtMn6nW14ji7JjDKPuE9QDFSquy7rgNvIjAdXA+7De7joKoYl5PSOJPD6+av2HZN+7g5NJBomNeYZLd

8OZoETNVGEr9gnmnTCxlqgw1wrVniUu8CABoalq6nw
bvpgqKyu8mBz7P+r4BqGrxcrA6ax0OgKaBIjuphzxD3fzIFFNaSxn12mBbwifNig8AYVmU1A6a6ObbK/

Sk2VSf1T/8+WkkTJ8s2yN/g/6tewD/LFtOY/v6MQCjbuQjWm4E+l4EB9mEMPC1OnG7juWoqpqvePzQTj

DqvXxkrTQbATJYqYlsbi152yJH6RSezb8KvtlAlgCe
h/+VK3QSTHyqdS79w+OgZJKcsxmIzjm4I71FLq1D27YftAm5q3Jr370cjpNLD0WeOSWLxxrjZtcJ8nps

kqpvvFY6clZNbMLtr6OAHFTw/Mv9l/sv91/uv9x/uf9y/+X+R2JbkTrnTgz7idufdED8/dFS/jCBx+x/

47/0jai2RKebc2xsvYGz7N8mWiXjU5cYCkKomhDUqh
4EjZlMfqIjq1dt+qJgpyVa1kgd5rhiTUm+p2fmLj9EYOS0H+nmTRCFYzWMNUaWCFY/NbgZaKJ08hfr+/

AhMseSQ6rTmU8KzVIQY4Ln/KmpNAdlVKpYhYLq7/NKaOCDOzkd5QPA4auSL+n65KzkDev2WQ+imYGnPC

BVVihThMFyjKGZjmu4Q5Mn/jkpQYV7niKWMwufRPS3
t2hims5A9C7d1LrXT6KPWexydtZGLen30qbVqkZFdXZ4wlLFC5leh6MpVDyZadCcr+mRpR061GAVfbMV

yTZksIxUmBDfhPe5LtcbP/aD90EZMDxCI+ofb8NSi3iWEw+s4xvf89QNGhcu4xsH9s5jRNRr+86wCFiN

jwtc6e4RdFFDzx83dbtb5jQwX9qGgzhkg2BJ+oJ6G1
1zGjBq6Wv8yNBrBve0voTZlWAE6XUbhlc59KtxlIhKQVpdIf1ZwLRHbE1GPft8fgaw67ZcIcbz/wVgdV

nHh3yUg1JwYisKGZkCP3SuNsgDpUIgBdbZrm9//7MGyMhzaQ2ZWb05+s+Z9R8qC990eMv7h3P/hocqMo

yj3vI96CIQg6eHlmqI7tnUevSwYTN3xyZ/UmJfe4RT
r5kUqMRL8jYRNUf8UJbDEgkNXMoXbaLLhTtRoRO7KRgTvtxRyHjzrNopTr/5W8dhx7R8N80l8TaS2132

2td13c++b9jkf3ZodTrcsw3C/3DGw7t6D93z2Jk7cduUNm4E615+X4tuYvKoYE3ssknNfwsHi+7Fm04l

U1/1mN847m4JIDwj111GiDFdRHRK14GS/P4I+IFVaH
FF0yPEQ8wTHjB302FmcBluh694OrU975/ji7rcPfnfonXnjRhyZzb4Zj1s3jfg2quIJDIVaqU9m3kikw

vgg31vZ86lObinfIloUxTFOEfsVPoeDMZPIyteDGh1GUt4vwmvh0wBepfLxa70pLpfGEMThhEXb8jSC4

taCIsqaCBTkL6E0h5F+b9my1Uil4mCkRieWKEC30pv
Znrghz98a5JxUS4kYsJ+6WasFL8wCKMeNVAWV66zPQp37N7jJZxp47IukPzvK8kN1JDY8/uRzX8GX9Rl

I+8xXldeXkc/VqabAzEEq8iP9ApEoI/aijrBixXMyhom8Hlqi+hLH97LrB4am75vHav4U6u1d14/S+1Z

7tgCsvSA2ATJGSq4Ix0udSM/5l/N065rES4aRoZM1c
k/RLR0+0PpQ3Ynt39eao9pLhB2cF30YK4R575YWkNVevLe0NkrBUlrUdE/yPBTvVRX7Jc59TB1Nj7Q49

xnVzoiYLdvmjw1FZa28M00Gef5xgu3R+vcd1bwEF4cFx39t7yoMYHYeOAJuMtasr82aDS3Qdr7kwOlp8

L743wPci0JiFrFp+k+Jy7SEFCp3JpchB2ekLuwEiJH
igZTuOS54HuCoC0KeYnGQUthUgKmL8/PE7+tT1plpIETOAbZRofMPTApP1KdNVS5tW6UOktc0pLJ/uts

q6fxQivtK2K0cUTSJYSrEcEn1B4gs2pOkUyhGO+gbxU5lNwrMHgctBuKbiE/q8czmg1s6yh/d86OUKHn

KrO9raM62KFYjekCO8o8HNSFuc3dzmtKzaL1d5X/iX
7/lag4giNe3GkObChbpPTeD6tCOecP6sjVBdOpgE1HFO7H+uDbb+Jw+aB1Y1FyRoAAzQu+LBxDUy4/NB

T+6dJnLEzk6h7ujVEVKfKK56fsXVpPPJdfkFuiMfUwQMykxyrEdROGsQpgpU3i/ZZOxF/QS2DIKXpBlF

2foDor7dSs6jSr2dAqA3AZnPbCmGMC3QvFucUMYgCe
KtMppdn878Tl0Gvxh9UyMY9es1goCLonw5xyVv74uHawjVBwzSkrhmy8Q7BhgkXjZw6zpmcgGEh4hGHR

6/kaiCJLUrCyfXCSWHbocdXBaND5KdEcMMv5Zma+iHrvBboWrGqA39OltcxqmgZETtheEF2eFdgbjkVg

qk14wmvukQMm4bkPoWTj8xkeSR915C1tLK8zyUtERm
YdRLid5dpYNTcxiOj6e4jb7rFBktow9xvE+8oBUORLCegh8iRr1MhvGWA4IT4yMffc5GwABvdx6+pOVq

wG/PpZ9edv8FxqFXhfdn0wDCg4rQOcRUHt6q2a170wnHyS9JI4uon9Ukrcc0SsutqsgLJybdHQK+0nDv

u7ewD9tooNfsVaIK5NpJvxsD8Av+1c8s5sCLIdm9zD
RwDLWI/R1elFVhzs8mDxAx3PBY1bMsvw+RCmgQVE1C+Wfx/GX7CMGW6j+QKExVjOIf50SQWrA3yFgX7c

b+kd+deW+4C0RwAtwdEAH0tItRW83O7m6HHnVTnD1CDEpBuHQgG6cPun5Hmfq1Xw2/ue+aa0VZEi48sM

ye7G9nhIHHP193i0gJT5ZkJ0AOM3GrbFB9ZA1VD4pv
D7oT83ERL1f702yMt4E6+vMYUHzAelEi3ylfO68HBYWNDx1ibG0LofVg1Dp4kjz69nZhtk5pW5mYuDBK

0jY7UEJ0YsyQXnilXKPnMrBf5IGF1hc+PenI67O653hPGddQk76NXM5Db4FCE7JCUyDfB2hLcKTWwR1Z

/i0errW6l2j7hoHevb5U2ANkydVu8zU9/zjf5L4feW
gUdrL/erVA7JEugxudUjgA0GNKr9mKtHfg02X1NANK3y95LMho1jxs5RyNNwv+e4xxQNZvWxdRcc79XF

Aw1DCEw/QFJnaU0JH+QRIBZjjmOzJTWccRHD0n1RRTcpkYGxstuVenN60x9EYXmMRcuoL3XDXqm2Yajc

ZtOJpl3AXE9P95y1ZvlYCYfMmD1E2cl9i4SImrk0dC
5fyI7pNWmnr7H3TlUY+h/9eJ5lkHtVtMj0oFwrp/IRxyuBsUXguiWy9OehrgS/rbyRT9iL3XmmeIVH94

bCwrRqyLrxWiPM4hNHmI0hPNLvPASgkS36LxBA+9myYpb7FOXkU2t1W2aEQb/Fn1rKu8b5/UJSdO6yvr

sfQww8kNxgJ3iNoR1j1kk1x5iZ+PTzPEGl6tDMTpcP
lyFYy246bODOrq03zk5B0LfmQZPXw+G6Vx/GyCO7vzZmPvKl23YzU/LTkQjusXZtvsb9KtU+4dqD4fIY

r43DqG2sRC02y7IR5pu7gWEhcyaortqkd5rb5kB4b1KZouTv0Asjs5CBetOokxJCvE+rPMow9gwCQT0K

B1ACIS3+3YSb4RGuO4ePqnfF9WHhU0FRAWUJ5cwMMT
mZzsHf6FTaYwutkTdCno08iVEYp7NuvkHYqmwexD1AdOiFApZlKAJdS80CMG9yeZ/51FYnKHo3cvM/xT

glm1zkjdHG3PcAixCMXYPlT+yozMlwRO3XFe7MG0UO8dNCcyLPh6bOxJ2c4B/EZcqQKzTeBtEiOwZsYr

wrAWL1BMGThYW3Fwmzy/kLrFeOVI+A38T+r55Z7xuM
cmyXA4efNAjNGYpiJkmsZ8MiTOv0ZVF6AEJKBf3lm3T51zvJ2TO5D86R+4AI88yg57WCIhIZa/0v0Ay/

mofI7B7IBNWmG50ivtO+vU99Noah52qzQt0k8e/1oGTYEBwa1tbnjXq/ZwOEYyj1ncrFswB37PjKawHV

ITEk86QcZdqpazADEZgy8DSp/mJnN+MdYaRTW+leij
wkg6CJP/PapV5aSbzBkGjQvahoBafuz0NBLnr03j1Wo5INnzqbeqSnfd9Zipn9fBLo1svTvc+cKbst19

uu575s1ni4O88PwBUlaNBpN30aqK1lYt++6qO7boTk/uIljlJlPBKmiazbaN9hqzduUlGUPz1OVfGtp2

UU2BTyy+2OdTP0wYJ4m9d+8LtEsy9z5nInjEIAMiQM
TpJJ7tzICLiFVpH4W/hMCU+MVWkYvg4ld4bNu4HK5iyZigSdlqGiJd9q1x7eNyvwbjf3Ee1RfIi/o/5a

xQy4dC1vfonvH7BB14pI/RpVh44CrixpoIcBJcEQmF3iajrEgesNkX1jrIyNaExCPjPVCHa4JI+1+5VM

62cDz9phLhUeup2LbDrvvHT/Q4e9lzJT3MDtvt/Zxs
BJ3lJG6R4uTe+09NFxgQ9akT5wiY3HXsUkpaECu8K1W/RpnxugEx7TjHq/cj45csMrfVbCCXJBHRH6J1

oUSEAxfVTJkH8U+9EmTuXkIbtOh/7YwHw+WuB0cp54OlA0+ZH1vImW0c60qSobL8OP7n21Nj/0X8J5yW

7E67bRapVd2DKUFhlqgQIIAiPyT0EazO0tUMwu1Rgx
G+Ycy+UmcYVzpxaicBviOvAqgBYGqHf/525SL1+GIyjTIlNBiNQYt8XVTlO++OSk314/FecT90i/VJgQ

zQtF+K/HXIk4SGHtBz07d/Qyk9ftk9V+XRtXPgrVg5bP82vIRWeKb1FzKel1nBgYbB8+xkaF1TX76Asq

rSmR2ndySNWciJhMT1xKNDVaKJ8Sh/sLBqtg8AzWGZ
rxwXfuK8i+RV0668b3S/WY3aokGg1l1/dDyu7INsm5RfXxcQrUuYlR7oITS8vxLfT29er9FiE/kGxYXo

UonulX8vJ2JDSGXrIctUjbkVU6XmI/n3GEYBSdmJuaojo4/6hm4uGxKMfAsp3UPtVaUPLBwXWOoXckwJ

BxFdPAoKekF0Dp+mD6CZMabaIwwrI5c46UdV1q2atP
uGcCUrMdwbx0BUP9RX9bnvcgXyhZyVwP4vQZFE7aZAFVGmRfF+5p+32YH+RttUgxZfKw04tWnTzN2hva

qt0dYwcsuxnSZSyp5Soig1MDueoj926QBRNHxun5HeKOrFiXhvz+cAKSZGqfbGPpOiZKNovoT7quxHdD

IdN18JbMTC/4UxkoAtIugSxBLBytAzGjWP7S96Osf3
mWugX+QTJsnVWvE90zoDg6p30nqrECW/hV5OJYapRqhdSNos5nThwh8Hws1tilR1pFrUShPlFq0PdYAj

6WwLfmwbiD/KF/mGIK/vJLhZHD+jdWN03fUfJQi/6NYE/g31fJoC6mvnFwyDuCEj5c5lisHBoPVYzl7F

hplFWufFd4j3f7FS6XMyKjRfVTHqnNin8eALAm/U1K
kUJxXsjK/clmHrS6zbOzrdwSvQ6S7Imh/YX3UfM8wLv4uZdIfIDgPEywqNHiZNUMgSP7wEUNfNk7swkp

eNWTSLQAJ7G3gO7gVSG45/BuX0dNA0xSne2+XzaDJ/0cwRZjK7/T4UZ49GZp/AjA5i/SBceqN+kYnhUO

ctW2Rwmw+KrRwZ9FDCaFUE5E0H/1/H4KUBREa44Nn4
Pk7h4kr7oB+nRyFqZCefahv9GaVWDWGLlDJol792X2dNIJbRsLvl4dpAkt1xGXWMeAOJxcdXZkd09ZlY

OdmDlWcKh4YmZich1MK1VE8/vJQAfn5EhEOcYOvX+fXHz2xU4iUggN0s0yjnUqKanOitP6g2WwU4ngS7

iGCOdEehbyCShTuDCa79JE4fsXU16AE3iRcYlTuf+X
YWHccpNXWKzYRbokmlqiQTJF1omlZ9qT0BQvJUNjx2/NL8Dov/hKdXc+uV5sIE/of7WW81jnzEbMGXnx

EllrSQAP1qt/+p4EoIvtWm2oMoxEly+z1cvUY8LksDwzkRHOO4smPWO2NWBlg//wOQrGBzKiauqhu8pf

BQtLljWIfHBgO92tV8FOV1qanP1yxg3E5oAPWLyHI0
/3NHGhq1SUTgWAP9WjKWJx855p+1GylVLCzXZRxFTPRC0eII+A+c1hdux2UmGJGdrSNz7BqeFc8vHQZu

N7U1I5BG9SmbIK1WnFPPvYhYkv0DOPafsCgsg0l4kVefOhhv4OXRm8LrZ/2/7CyBadVtxjxnsUMe2WCg

/I/hdun0jTaJ1YDXSs7S3wP42tOMXuzJTi8dQQj7lP
3s6Ulhz9bhmgSCr8LRa4vk1hXcmrpEzq9H4YzCWnbLTivayfhzpcwzy9m/pCCS+opDkrBJrbAQNHgLnE

dhwUmP1409220C6lsgSGF6O2rYQKQX+D0nSwcQPD7duns4z6B/Y+TeEdIVJiHeLRORfs0ju5Kmc6DykB

/cHvM0YpO0NOZ61ZyTTbISr72PWq+jqXFIpm3+zU55
ZuN3/2Qsio5uVjXsbbCscOPIeH7mBS0sI9PZn7P+EOyIG1VEyMCetV3rELpC0gRozNg/KYFTt143iwn6

40zZS6bMH9ZB1nZ7sL89yzSRFge+I994CSR4TCWhzXHCSmMdgF3xWt3gZOFoRWuw5/SGm2+Fq8hlzsMp

M1OUMfMaiysE2gn9kbAwf7aMAw48lRVI9fJGMKVAld
evdeSDgrsPKFfDCxoATa6wd4iVKZuy+yUvdFOWpdPE81373E1RA6t4667N7P6IIFthsU0tndGF0B4JP3

04kZ+2b3WFMbODi0zMUMQ6jCSEdKXFlDZS78BE4LeCGZraodgMfCGO3/Cf3gKrcuBbwvfEkoyzoa/25N

5532rWRehPBRSRTqXaX5ojq7lGa9i342nt7+c/lA4C
yBRgWJCdCsYqy1kns7CqppwPj9pdNm/K3VWvE/m9ANjivrsIdkkBas8VPMi/8DlW67Ck5WjJ1BPYLp8q

3WEIu7WbtM1t5we/28d08xetZ9DmFuvmgwtq31Mv2GJgkYFx/A5XWJ9/1sHFxy3dgQNO/Ix6BE52osyh

6hG/dD7HguY8mZOs7fblbJcX445x2vieyXLVgj/FV2
zUYGOUtswIE3K09ug0pO1Dj3gb2y6jem865/0hVpuvQEnCmutBOF0VEGietPgpBBDHsRtzgXix5TNGqh

qhcHsKVb0EEDMaBZk0phOem23OAluv4Hn7x+ZUJt6o+C+snlJc+JJQpSwdtQWy/rLdHmvtX9CIqaCHUG

p0llnu0vP2JaWX5QX6dwo78zkos3wZmpCyqHlTYGIG
7d+1ofMVbFKgBy2zD8QSfU28/u8PRr987k2qg+OP8aIzcrtuC5a0Bn5wNHBT06FoaeOGC6ME3xiSnEaz

ih9ZtBtj+SN20571P3Y9AHUc3+qyvIroSPvXXnj3dUYRJrA/I4VuUVh4Y48DvbVHsAlAnwMFIwTxVhDF

gqerobw3c4SNwTijrSn2fz5zwPnjwaDfsFKbEq/tc2
ZMbG9kbCQQ4JEu+ephZ/MZiAfXPaAe+dky/pTmV1v/7c0+qOdnZQIv0T7axW/Phphvh3x0UyfZahMrUr

dxV69X3FqESWK0YsM6h2j3DLpiV6Cv2tFgQrL02enq2wx4LCAiX+hojfjfme3fZWGXexBN2l14zmv/XI

rh4vTKKjRdj3ff4VgV/Q5h0zpknXKFowGGb2FH8JF/
zsH04OoFbpLrEyAZh17TDx50P970tlY6VgNdtxAfblEwavEPsdVjbGrDyIJwq3G2iWyPs+eaP5nXh6qX

arsQlMu489hfEMGFLTioTIBpq6F/A1qejCjxjho9VOLPGfccl77/mSFwO9ky48bYUAufZECO86RrHQLz

kpCoabiVkUbxY0615WkrEXqzE/3FSslh0kxDe8cRcl
Pako/k546SF2tGcLkiCk93Ho2ml0XjB+m/AmVbY/3Z9FLhdX+T7fYdhqaLqsj3SRWH1ayU6HlTVhRZ2h

zv9uu9Md6UX4UXIKB0CSHXHxyGHmV/oB06n14IfXGnN2NocV8/TPvL7JvPxJXU8CpwVl2ztmDWmex6Zc

7NSaOAm/5zC9TgkJqWEY1OktMlePNma3D+to1RchPV
vl185LH65I/p1xpLy9s/sllNaULWoVUGbI9XDEDHR2RMuxH0/dMjQ7zxSpy+IqCG+7fLydi1pwK3MrzX

4AV8zazlfRpenku0wIZIny7/ui/XNSsPURcbguH2SH3AlDvue/MgQnzrIW+b0EtewfmTnblCc3ezorz6

jp9QptOBMufmXhvhT/IlZUgnSlZE7qC9FVpgWp9lEk
9YHKE6/Ca7/r93xJ3pCNvgwLPEO6+I/H6FzWFHcS0mmlw6y2FwGgPZIM5caR22j7a9EneD5/j+1T/LDm

PIkNvFR57LbU24Aae29MWP+AMFDIMz9cgeDeo1zXFK11fl+Tr73CB7TLGXGZwF8v5TAH8kHeO9ZbOogE

Y44ehqSl3fhu3I26/sLfN+jpWSmAcPLzuya9BUYBaj
iKo5bJ4YQnf60wekVvsbQSvVgQnuNXEKl5WasrX0hCsV3ntYaWr82PkT0hkOhMPFqqu1yClTQDuwh3tx

h0c9yV79PSzX/DDwvj17WM92wuuOT7tVj/gy/iw5vI5u5SOTCjS9ehO7GGc3cz8U233Ia+LkTcugCSc1

y+Selena/9rmW+IhnhZkot2uRw0mbA5wQxBA+8OmGsUoX
yWibS8dGt/SVoOiFRmUUQ74H0cFBhEYWy1N3MBiWRmmdDbymYwcKDYZTboEQTDJfZb6sqvCz67VziNyk

bKFSVc43SNaNpiS4oYXWG0bIYOj3wqdPsxCbyp9YtWxc1n0nAde8Zhflbg9lEwGoZ/PSYFRmg5m1YW77

FI3kJ2//yyJEa4+UFTW3VsCLGuJBwdKG1/qW7Riln6
ZRfKinFrejCEWPmkUhS212Cj6S5x2WLwX3NJ6iPcM8qAVp/mnoPb14WEt57zxz0I2GzxwQw4VMwJ2KYc

XL39tpAOQ60sNlRQzh0EGSVXQQree2Muvs5H0lLj4KewpIGNw4CCGYRtUzAlo/6yWP8KQux+9nmsxrY5

MoSlf8k2XWHlbsNgkZjlkq1XrSVug3KM/YE2elmq5J
e9i0j3v8xmrpgIuY1Cd3TIv5zk7GGlvbw+UMjuuA7Lnno/Wr5+Fkb0IoRWlfbnemRZ0v2f0KMl/fuWCI

OQPuQ3LbLcq2uwBCj4w3O2Md3dwW95+/kO01cPaQeyo8X1fdfjJhmn17g64nJ6VkywJ3gq2XPbbLpUF6

9TXJNENHygdvcHGxfRYyCMpjuCUGUVojsZgH/b1e5I
EaBspEyyTV6Sn1z7R/auPfweCgJQJK5w6GxP3W1jZCl8/Pz/acaDgePpner/uvmcx+57vgeunfJXPayW

uNdcp4FXMxddp1N5uq27DUmBk99W8iWc48gHEoPuoYfboW2XaAKnQCpBTcFomlGerrKoew5oOX/L4W95

hNDy6EspjBQqSuLHbCtdgzqIJ3EVJr4j5uQ4WMGwT7
0P7ysys+9jZLuxNRPye6z9GqrzVArMgvSiZZ7c9OhdKxWUchovceGWm6Wx+LGk36F8HE5tYyYymiCJi6

ixs7K1UOt9tOU9T0sWeZ5E1VQC4cSP/WnSJ5Azqu/yL6xKJBfCBYkVcpQDFDrcCCQjibUOjGwbT69g1n

iqiCuNTMyKA470b31gWVikoc+wJyDVqfWzQZmGXg7v
FrKHVRFtj3c13tXcvfikuP+eca/gduZR2qxkQPK/BrqC3rpjizVqjNlA/AQb2KC4mdtxRpZvHJ06J2tI

ZX0/wRfQaPs1yJL+vrhtdTTtfe87+9t8pmXKWD9UUpg1ZCP9ISXj0+NuN6mcaXkug+z+8vc39JAVBHHI

LxfbFg+M5KS6jvWoEBBTC4k6fnXzBOfdglEbV9qTF1
Toj4ufgJbf3DGlMqnkIWMJpH8irw5ltgJJ1VLdkKR92+elOu0zQDb9dGY6qM6v+qd8P2hRnK8gQdSaLW

R8/DSScGJmzwEp4/jLgInUAhN6z+Av3OrR6IdNx/9F/zVHpL8A0Uy0Q/fJPZmimH+iTz9GqWUnkqxg10

tX9HAvw2cOnbrGw0xX/rSZk57EP7SOlH3I33AtMKt8
bgUjFHJ/ecSyDttxlR2jTs/Ow3fP+dNWUWhQ6ULNuPfeboreMwHMOg8mTGWXUvdqaM1I7MnT+IkyiHC0

SHnFLP5lHo6oBtDSqVVH17jxhXdmJvXr++7BVHO3pxhJrqOQEObBt7UVnYirtt0nKhGAk/NxzrjpNaZz

cg0Au9KidFmUAhFRZIZJMQtMYNLFU1L1s0Gv2/vHAY
n/zwqr6Ur6DtmU7x1NElTL3irifg8QNAo01EJYCZ53yTUZvE50aJ+N6magDV44zhB8OmyOzDgT8Eiroz

+OxGYq5PbfmSj1j7wvqAzuZUySDj6wrYaLhlkP7GZpDDkY7Cr1wxswO2pmnpRExpYSz1EB1v5/dVtb34

Eue6DAKilTz3OuEN+vRi4gAtrxeQ3dsgfiulBm3SwD
NnCv0QjRdTPL6XoJ3nO0+xpFcH1pAg/9SPmNzKkfgPasIJTCqFSRXO3ZobEeJBJrA2wSpIeOaS+eVccq

bUm66DlZXqHtcO2IwEN0UT1y+7k1BVCOQa43rkanfI/jDOk+GgxLwJULsKRCq5kCUQJzehZVoDyOzXas

sSRAc5JexTjgQgciJh0P3xWPnOf27qc0E50GFNUOc3
7eR/gdGSERsf9dBkLvVxLfE/M0I/x5hp2ZyXwt4pJUV3zorZ1b/DtYtLmcl6Ql2/XxI4LwQWaq1pdU2t

Pj5LQ06ssKyNU4wc1cFCkCSEp/cK0KreMxm2OKvsL4zys1cbALNpaeLth+6V5NjkMUgZOGSNXFK1yfWQ

7HOjzGCcBxfwrC510nTAzUKwN4ZGs5igvjzhiPL4Ot
9dVX6t8L/WJE0MtnsB9A3cwJrtSgb676+r4rvVJKh5aAXncy2SaXeCLB7xHglKV9wXXqBVDKNI2Mdb2V

7fMpjHov2+KhIyRFvaWFyWkv1z9DdNHWYOwk8d0ab9E9kN5Gc20JDcIWc48fBgOMrKmcesV1c3LdRtoB

gq5/p3g2Cfi3c6zGEZEHP3ElBOQGtwJk4lR7p2lhvn
3ZqU6exSEU382Uagw0laU2hze6gOq9Q4VRf6Fhrezo+n43aKylLxyy9Q5vp2E+9+XWMyR1vLAmDwuacz

FtZexrG73nsHhH6H1Jb+wAub5UwcL+Vo30rSQsGe1dTlK4Q0TyQG6ofu5KKm81UDyM50rKj0sky/DzsY

QTNO0NwWWNkJctoSmcW/afQy8OjXz41GY6eo9k5fll
8ZekRrWTJYVMnN35hJIx8ullWLD2XGCja2bWClzpWXmYg9htOwzD5T5pFFMt0u+5xMScenhma9gJPJ+s

ONS1Vjd8enlPj1muqq+v+PlSO+41pVwWvtvzOOHNoqBy/aIEM8kuZa+J6sDE9pQreBj1AYn03UxmU+6M

wI9Yx1mlXjHxwgVpB9HaF4ifGMBMFh3383gGMp+edW
7EitM+8HWA06jWa71xOvXrUv55D7bYxJ3vz1wdm2y++g1iODQrgpIg3Wn4Uf7T4UtyqfcbI/Uek6xrvB

nMCjeK4bcPhb4NVKUP3qGshezjge+BCwbh6ZVsvzPEy/DjAk+TqzkI6BuuQ/CLp11b36pLaTVh5fCq8o

OrC7IaLzriApNEJMt4cGq9oL3+IQk1Mh2xeBtm4ouY
KlpLcvFh0B5ZhSxTxzeK2UsFJP/njqpAnuqvwZJMEZhngfwS1MML7QezDb2OqXJO2HLRAHeMWtuJCC+F

c5xBb73nWOqS8uddn1Ec6ikA381+qipmsE1ChtRqPqAS0o0BVLG10oFOEGjdrK1DjzKI1SdS7WB40Ymf

1DVwfhV3Xaqc8Jj7t3AH8BK4m1zHnm4gl+gny97pHM
BgLeaiqA+g4WLoGcUt8deYo1N6cJjD5iCE9uZL5jnBuOg/l9POgKcgYwRjYa3bGCf3xS7mWBY/S+nOS5

HZbskrthK7mN/RX4qrxnVT21BMvvsGcTGObWad+hPO5MdNLfu/9VqNYj5hsMw1UhnqFGs91Q9KmSCbIk

g10nhVBGvrSf0c5uOq75T1xA61NaBXzOo+fraRyxqD
guL02dgVMd/Cqj98NYjmQSeSQ/IzDbzoa4l5IhDa8lqfTcACmxFVe8E3YTjkTDxjtd1JiryCdxYx+7H3

JO9SgADHR9djpM8xJA3haJcjIJU1SeO3f1ofdyqoPQ90scKxYk1FvvhjkeafUxYSN9slrIA9kqFr7qCt

h8aJZleai6nQ8SB1GWhv4G4XIomcSUaTIhXfDcThKT
JqOBAG5sVD0//wYvKeIepAHY/yMyw6aOniB30VgLJy1/14LKR5WQy4wKoXQPZN9zmDcFwV7g3l4YUbwB

EmAh3Ys2H6qH9BuK1YSe46jc2vJsFHcZLNtJAIqIqIFlDdvmQm8gYvAU1gjitoveu40Muw0KHstmuO4+

urWYrzoNbO9XqBPI4sUyTdr5w3WsnZtU6zNNBoY06i
mQcKf3OcDnWD327QFM08ATHHYTDL9l51xqCwhGMRAUQXcEpApeGqYO6Os63veVsKOQEtveJdSPsaXbcx

eaU1efEjKxAvbhA/2CfYtfvsv3XqBE3H20eT5JvqKOZ1CMMnynVbkm/RoE4CZ1GmY46Wt+R5DP7A2Zhi

KlYIwQnmCpmi1tLqBoZZpXbMNMaG4+sWeW+mKk/7fq
N9miVAdnlT4VFlcONiN23pkP/U92yfo9mct23+GGNlukRax5bQzZNPlX+t8xRS12Bu4XgowAThXDgvKr

s0qsUlSKI+/aXp/71JvYtHOZawfIDidbfZFgbIaGVg1OkqifHeeec9S85AnBoHuB9YUdH1C7uw8vHjJz

bwUw87cr8ewOiTaz+/tjNGwn9VuLbhvj6tWH4WFBip
i+qwQV1kezryVH8HPHbqWZr+yJvNQMQ3OCfwuhm3Q9ohjk7sP5Bi+mRmjVCSoOyRTgNs+isugnvyZ+Sl

UymC01SRjGhMF7xoq7hYM2w0DlXzGY1KyTimcFDI7AO43WUQXHq2ylOgrdHgagZEn/LGXubLWNTbAW4C

gIlfQWO+NsXF2wBUelRmQzokt6DImUNB5m0Nql3SUD
mBm7r8A8i980Ge6w5an1R+l50klgYpEi+BEXM8Rx+IxqOUVCYlSmdsgK33lj/saWqV/ZfuPk4YxBtBfB

Ef0AitK+VkJYFZ6TekUhuosrybweWfwjQiIDu3cx44nQkv1HDOsx3v/pVVgRZs82tabOqBGc2yxmL04t

8KDGCNujRSu9Ls02nZ39s3VIRU3jdPRrPQD0COjPff
xuayjkk3s/LWV0APt9UekgnOuBRM1AGzjf9AKS4J9ilar5EP3+oUioEcHjwJecFfiETmj6x8H051qgNm

JRs3SUwdxEEN+7UvaS/DknHXebv7jAM1Pp2N4E8gulvCHA9hMHatin0/3azFKTGRP/dmTw+rvRAuVBGg

7PYw3QHyZ/xcLFaAtNuL2LXhKtc/ZNQx3PU8ttWrKV
X44jWF8fv5mwQDOqlH6KnRJSL79x992D5xNdFF68jRVvLAxeKRZyxl1gD0YDyuskuI4HQxzHPZukkGK7

Z+ZXmLPsMVfr9WwrSR/39SIDvTmW2BZdYOn8gj3nbkb4fxV2jvMCk38jGJCfEAyD5QeJrRTlTDgg5hqA

0/1Biz44DYSFUpS8oLyRl2uIR1lMz8rJvY47q9ZjuK
WhqLeSxwtl2C96T1y1JP+i6UIlSJoryXr9xDdkqgEFKv79ctajUCL2vFWWvxMcalo7n4ZheipXOx/3gB

Voz9DkfRdC/3KBh1C7PFGrKyr2RE6SmDaU3GYmvjaGFQaMyPwosnMc8nAnyJGdHuHXkAWaRuXj81MG3y

Z9VG6p9YrNN8gxGo503hnJm3gYr5MjHHmQQX42ye4V
bAd2v6d7Lcu4tvPZABYpawMu3u1+sbUWzT/MS8FY8GHrLkokWYL3+3Io0EUzmOl36k+l47oC+VPc31BA

9tg6fvbSXfE7PvDQ2O8zhPsSptGWLFyekonWfCPAfNeFEDPhx3PnNz5pgGP1d1aCfhT7/FJqax8pkpC/

Xju0QNGhZheGHot1a743guMOrsLV295rkoI7lXODGy
/y4onU4tCPwrUF6PbGBofP242bJ5kvT2UqSg6NWoSt9YqHMxyqqi9cAvFAEM88u3+1r+M966d6+T37kB

lvK7PkYdpHYf6cbolL7XBtK1eeEryrBZ+E3C5sSreIpWk9zCMIrMnNMjYn9OFTxQlRIViK4JkvVbcWlR

/X0HgCoDiBShVAA8Lkum+od7WP9xHNBBk9oc/iFsip
ExGgqnOMgTjiCILd2dGiluskVfd8+DPVIi6JaqyEOVj79l7p+FGLyMHlEwpva54NB6HQ0rE7EXFoIkWu

DtMeQcDCJTta8K3mUpoFgSLK/MeB9xLYdvxLtJ28sXMv/FiJA1tiD9dKwxQAihlXGXg454DmMfLPq+sx

TEnaqORNbHYnrumvnGyJT1VZXx9xcq35HDQkbx1DH3
PUiTMWJTb1JmgxRV123a1+Skp3E63bpI902Zs70hqM5qMGngqxx5J6Z/5AN+6gdU1IqFrsyfvbE6QbYw

SKhZCoCFFMmPzxr+0yCF+jW2D3X69/zOELDHd05oLjGfUa3VUZSPPR5XdoGUaUaaPvxm+PKEln4eeBZ9

szXHhHY4EmUomzBs1+NxAuhbEr+d7A/8iOS+CoZnOI
Kuhoq4B0AzEROEzeH0KGffqp1tSuaI/374Pp+c+E4R/ZPmFEHPwRa3fW8lsbhieakUWqDkMikvvVXH72

URDyI6vbElBdB8AMztOiWyQdJbGwX4Iq7BBjygY0aIe9jf6bW7ziZHw328yMbh66QIvE35FgM4wXqoJY

jbyt7vYBcr1GGzE0CzMt1zR7FMhYfDkFqpzFh6P0Qn
lUCAlUdQGl/mWqFKewnzUycY5IL0IeIp8VLXvkWziSDN2cZBZ0/DZOlzw2xleT+zy6EEjpyolJedRuuo

+C0w8nQh4HrH+xHdeHBTGBuIqWLP/E6h6tv4s4cbK4fHwXI7iWEnD87IQhiFuzp1L4T4JMoBqreFawKc

NDdKNOe+qlqkub49scESBJu9MRl1H9h3nCNqoqJy7o
71rYpMpoa/AEilqQ/OC4kJlNc908WI2MOW4fU7XGyYcSOFkxlvoHkDVSFWatDtgCQ2DaDfQ+drGnWJHb

Sprvtgl5BZyyekIY+RBd89XgbymTnU42dTV4lINsAZfEtqlNu8+DybzFWB2keOBA+7N7G52yT2DO84P8

wgAnxjpgN4WlaEp8uwMummboUxa3DwoByxRl1fXGsu
Bd4bsmZrjcDTlvnVg4cYdcKSgAus/3yWHB5XaLnJK67fgyKX4KlubHdKQRs4RxgE+eGy3r7kj621zp72

T1MBypHFCjCHZjQgE3XAchasSBstLq8hvnPga/TtKR+uFRBPsMiaxTbO79T8FG5M3LahZ7jzhUVWqCnF

QjDX5Sm37m+QCmvqa3yHw/9Qw/fCpiIMYYu2VxhVBM
1YnqY6E1zvnYNr5xWxjUOwAFwF4h1K3pXh8Q3G8Hg2xinUy6R1POQtegA7b8qKeqeYW0nJ669r300Laq

cpwaT9kp94WGv4k9/4UYHM6/I2l4eq8DrjKorZqrYla6Mv4YAbRn5ZqeMgZ0J9V4QolFNmntVfRfVQQ1

itFy2JSLt0jIkbHoXB1GdxtfLHpPSBMmC5x4pV7jM4
YUzdFyQBq3q1hHeFu2lR7M9cK6aRJfTSdbFSR31psPsgDNyYSFwSBvO99zG8a72K3Yx5o25CsJt5MOgc

rskq8LoR8KZxZWO3TBHkbW14RUe2ni8aInTqBrJl+FViBFbeYI8zzU4uqcmOLKBgRIwJaxOObOwx/Pp0

oILlgp74wWu8PU64UtWPTQfGURItofQxM2fZBykbDF
Yq1z8E0M05mIW4kyxMXO4mjkiJdofqYdpRDfwMjWgEeyRn/D3R7TyTyqcopYoz0E61GfICzVCv7K0EPt

8ASuVt75Um3zP8peHShWmatdQ54eKWPggvd4PeN0AKEPyXyvIpYcC94foCT7tgurfV45x5oe5tu5O1mK

GH73nPBhOKft8/iBbcCyQDOSCDPNGfukzMvp7V5wJN
BJg8LK0YseL3mLtErdt297w8b48Yw5SZQd2WYs/s4F/L27oQJXT6HOl2p48aIg/o6RFPgV6ioXxASbVP

9p46yKFpI48X+9n62wD8NpN36SQQNtnlxjl0un5jKLxwTE2lSRAldlK4MEOhn+QQW7KwOxmLeOZhUqie

NCs/PI4F8rxY/3Y5sNwWXsCSaUtqcBxI5I3l6kczg7
1FYwWUcLQ+J8Fw/xxlJyfqITDqCBh1sLwNFfwzBoHHsuJxyOMsfmLyvmMYodIkmA3rELVN4Wfp0C8Cuw

OI/r064+8z2NWttO2+pvwxfdNysQF5MzNOXVVdKFB6rbiDrNnO0Ygri1QvlY8Fev2bjs3ZKc5MHKDx5+

QwrCB6DTxH923HGmo+M22hwOJJ+eKDUtyt5BEXiEDT
gK2Gnd2ZkEmRq+I+iJgXmWQyhOdh1YZPK2+5z+yRy+h1WCyJdgx9fxnbmKO+/TSA5ZL0BL/83ek+Ht/e

ObyJRj69WSffYt9j+5tib5W/KUUpXAjwqPrOgnmHI0QH1Xedws8a35euO2B2NxRw/rQSpHwKc356NUVj

/aU9VhD/7M9GU7fxuIM/F/GpV4VqvgI5Xl71eL4wOC
5S3l6dfe/rnKENBAImu3do9hvhyRQxYUVM+wSE/VrqeFW04XcqMtFzG/4zhHNE33SymPllRaHEIF6JD6

ukN49aXHET6xw7EVpBHPbp/qmtOORMMs4iQvlVIC0GsZLC4EI8SsOAXMJwAVI3Pzx9H613vi8vH6darS

mj9msJklZXeZmwfDktLfWmUZjmG2btg8+ywaklkYD9
ExHxMf4/tz+58YDara5T0kavFVlgX+a8/y7cJzwJIW2B9kagjm9Gl5Uf/78XrgLGxBPtRW37wdNhsVyH

kVEr6irl/EInGCKO70LtmATotJcDeBcLi6K6wDa9YMQ/3/WcaRdosqHduz+3LQlQUSaO3Ab7Eax6Pk/t

4krg0NmntXeL3z91l9KvswSjHgGZMPMgKtK/DpqRQt
KDi//tnl/sJRqNmrSxGCSjho8sfn5RmIetNGPUGMEG+2TtaAS3owwWPbgIE2LJszozf5oPMl1Hqs4OMZ

Cnh/MBRQPmrG1wziq4E0iQfVZlnOHehqOCxKsE6V61KHnQbMT6u6ILsjoiXL++eC/RuvdBXPpjkldGO8

kh+2OeW00IFKhQPLHETBvWkuk59ry0pBf80aCZ/g5I
sXT1eeWr3RbooN4oCM4ADiRreIUit85G8Zk6t7Q6nualX2gOm3GviVf3L+dxS0W7S/Wb6WJqOSfGSrRt

Wy5kjw5T2vYbSF583yh/1luH2Hls/fOWz5TBG0BTlJnuFKV7wxkYeYKSQ97rXABrjW2PGej2a4TyEwZK

XC9koTX00LV8FFzva5O9NmElNoaBOAnHTokDxOeAE7
4yAk28aLH2WA0c62lgk80OuRPQs4zKL+Pj1roZKbevJm4AIUW0pzQMvXugQ/fTN1UInKlAwJb9MKLmxb

0+vAcvg3lEWu937t1qxO9kgONGBcyQw7r4riKE/bWTdDt0xR8JEsVsEPotgz0rNDtg4xftOFtD8UfLey

TbUceiL85HBbDVI9CXS1EicZdBrRMkrr+NVp0wgnv4
aeZJSg+OD+V38dNpX07VJHO3j8Nf29dSXVwoOWf6YtVDilxdXbrQycvQTpGNWoB6PDWkdbJKFbkV3T+V

5V7JhDBI2rPl+jRZAA94LYi+tlY7GnVALWjWKNxpaWWitAgbMHsSkC5jazpSwHlc4UsuwpWJ+EMPbiM6

buXrIerENDffglfPXy3o4cj71rFYFM59Yqw3YgpA4Q
b6/720SnLo/55SdDbRejwo4vPioHHrFdM/11P+8DTpsCi5zr6Vf9/V+m+Zev7Z/9Ox0+GIbpyN9E9dp2

D5fzt3YwcDopiNmg5IFsXOSH+eVlhwfXN1Fb26pvCWlmhnbfl9jsDQtx1Hrehvb3s3Fuqt3WC7jOnNbg

0KhvK77hTfa5aMOwQJo38enjm2K2W/Lgm5hjvDstd5
sHhTV2Sb7v9V1+myXhF7YtcvkFO6FpOjzNli8Ukhawx2UhTe6mB2JtrfPaJAjo0rXZxoBfn8cnb71/bZ

k4r0n+l8pP2OZp+lz/vs2Hv3PseDBuahffWv6Yo/7iRJ2fmzj41+HiFL+RNQMOxkMjyauSmhMDEwPfL9

F+TfAwasVWfMcEMQOXwt6IwKbkzpq1DoImchKmz+Da
2EfXdDBUOFStFQ9S2qiUnYTEBAuN1Y9ZNekQredjYf46ffbG2cG1Q6UU3VFAvNwKQNl6/OFMe+URMGzz

B2s7/nmydYnOc3AjbjurBQR1hfure3fXH6uB9sYoVlt4dfJCupJyJg2RRN8T/7zX6fzfn0Hsj+GO39Vy

m1gLpJW5xxOJjiDTvYl6keG+AMJpHOwe6HtJoD4oTu
dSgd57gRPhKHftNrSQWxHM4Bytf2uJbmtF968BkZZeZEUU7mrm9ipv9c4THbvjhnqPU1XZKRZdV61spp

SfFXwGes8CiN2a8kuGF1ywDAQjOtFU8TGmXYBHH64Hds1B7zNv/+4U0EcWdBYZwQI77ged/y8Jl87fCd

6og7OhRO4Ac7v+GjlQ5W7g/GmqYRGeIa+kuBgTLJ9f
zdUkWkuNBHBFONNmONfsgu/Lu5558kIxw61aEaHVW+ZGddEapEyAFVq3Dt4ig+L9nmW6TGnp56PwemYo

bW6AQedQC5Exk3m8/yl9/vWW95T1Q6Qz3836rfL7iUHDPMLQUvWRwmS3y9AoLdlmad9v8ndF5avHtn3P

+4+E3ksQgoMVPC1lA0iZBos+rjrtlul+f2YuMApd7b
yV3KHAIbeYtGhBBdRkcrfsm6fyGx3R7nTKmlS/NLfWB/rzzCWGKk4c69JwciCAHu6o4S4tvv/ntWPPaz

luPViMqSTQmb1fhW0FZ5eg9szNwXrAtdnVi16jKOhmyPdu9NMIpWSSLOknIiSwAfu6D077fwKS8rWoEK

Wr0kB+PVUk3bwsv/fOEeLMwiKSY53QRXXflZYgEnjz
3JwY4SLG/7RvmYMPEh1Rs2zO7J1LYP8TFgt/WzLcJcDyXXczS+nYNuX+YRrF8JZ3TlFPQ4+oO1y6V0lH

CVeVSCo7g32Y97y+4XOfLoY6KPeVkS0RqDg8kiNZWDq6lAx4fU21ip7FC/o7ZIcFGAyGfQmMog2HBmS2

30OIgwvsSN19bDHOaGPH+ZziHn4pM1hv9zTqiaHDuI
sm+kIoujnu1RsQpkGqK784iX/27JFnIhTEzvGsd5lMA69yA9Ig384T0Ip5ud41EsjtFccp6yZYcO7/yc

3I9hE5lvVqcHryDtoWj42XeMJJqupMqinzA5boVcMl9tBCYs+apsBr/OaXUPqFm2iGxt8UCg6Q/3aTca

aIgxJQN91XwUHRk2GxnwVA9SIk3xoQTU/ThsWj+kQw
4O9SY7pUeqjjgNVQfqXoW4+aRjP9NoiTj2ZxTATbPmjjnpQEZteQEQvOxf4rmUtqElQMhFOMZW4Ht1RR

reerCnH7d1FCO+s2sUNcgXBVwjZWBCpNo0zdgLLHfyhrGaM7HdwyqdrtEvZ5BYcWAapGzV657esBw4Ki

cHoWkV5jHCxQezme0OOmbS8jdB4CZGZX0BgA1Mh1Y+
CUvubMXdAGAItdfflbGa4lSFbvw0M4+nolZE4oXIkczNG5n5hXYkq/mH+oNf2nzkbt9Zr6MsBZyd2u3/

0yE5D4OjccBGAFll7ixNqTtfVLxeo04dtsx4ghnYJX1zU3OVs/hAmYSqfz34t8f6yl0x0PHBBpPipfEP

8YCTNGBcWB/j3EsoGALTe2Hql9kZ1/7aIkEczIewHE
kppq0W0nxK5alu4y8/+AjV/5ZekKXonQq0ro8BBgDfo4JTqai6b5dmloHIcUP5lVgZIwFA5nXVPs8MDQ

LQl8vHlKOVgstsRJRfced7CZP0pBIP0qcSqsUt1li7PAT/ejUhVpuKN2DZQbvmaVJvU+CaoCvA7aeL1p

AfAy3JxVV7oA9Lf4w/4k5qMijTuP4qWU9CJwK2oH3G
bACKEbhuhrDNVbbdc1gDsX51nanNSkVpQUZUCB2LimGYKVgm1GyQJ5OjZBgYdX5UTSIIx73y+bbZEihi

jop/KXSVV4wsFlQ/T0EPSCzBaFSUqxvrWMIZO4jlX0zFH3c6w+N8T81hfxrSAurUD3JMVR6QmS/ntGYs

C+cgeI9xXvk8pLcc4D103rnML5CkryS0et/W3HW9lz
wD/ZGVhzKpI9K5fAaBl/eMTF51iTmfo7q5D93v1Gd23OA8oWALFKHiDBvuNY4aY8fhs3v2zQIRmtS+bW

AEXozqqfn0MQXl3y8WgqyfMUWDck4GJ6i4Vc/oAeZ6xlfh3ViXZYnUgnGUHFfXQ4WdSSfAj7HKxfyepN

wGDsA5a7tcypultuJEMtivH6Rx+z4Z72bXsu3Nxgw6
FZ9MiBpYtQUmCSoEn3oZoLpkaL4gIlDpBGU8diyGvug5fAi89f6/65YrktkvQcH2k0g9GnMPYKJhZWKf

kHbQ3Yn33Bj9FCF+1LVGzbcrDQsfNfGkewS96Oc7Uqr++wYDhfFRvgdwnoc1U0nQYuUb1Sgsjsx4BID9

FuQrAg8P2+oXNtmmbdqmK516ZamtIT+6gXyBY+ZCyW
1dd/BALiXEebEPL4cN/GDV6D6HoOHagmS55vqYyoGag8543yn5b/kLQwJCcagtx77QhNi9ZliLn6fdWJ

5DmE89fXthJqRMcRtPdh6mzl4Xz0q82A+wjy9tcN44/OAXU79vrVL7kWy9h5VSapr33Z5Sxgam84m8q1

j6oy8HreoA9D8sHUw0Vf6wTJ3qKCdkLv65PysWkC8P
2fh7YdWskE5Uv6mR43eeJaWGt+njEZY9p0BJU0s08JBMcwcjwUtp38BFP1xjCMTtrXzzZ4JHmSk1g4SP

IoFoNlDyMEfTVcrHQo/WsRaDKRhpNbcX1n/m+HVTAGV1e7hZfIPmKkcwfTr76sAKmZZE7Fp3HehqOIm3

aT+zWuHDK61fKPy/r6ZkIWymuK/iOz4g+jcQOrkL5/
rkF74+iGQspFdVOgo5tb6173rf+3pYqT0P1v3O0oi46Qq00NP3E8HicOa9GdGwG25PKlLgFdWyBwDG2Z

nM4fCpkxp+Q8VlCaCFBeYe+6/dh9sIIzxMZSd42ba+oPTUlBm9vjrz/BGCg+d7ECJBeVs42W/4KPzgP1

x2Miwr2+/1syt7f4N7y7LuOIxb6qu/buYT7KEf3eQZ
UJ6+HBdhEZ/gayLpjzrCgwUyFpVt2SbRYDUQXOjfXCAwcOH6Xjl7JZ6QjAm6EgV+Qxsqjwk0CNWDGN2V

Zy58lJQs1oKNFcHjrc2+POQuSTLm3EQJxYhocAYAz27KIA+B4g7uimXfMNFt3QZXaaAc2X3lXRKWMe3R

urhdK4mTVUQ7wHdqdYTd/kZhfzd39DSEtO/Ei+YYrn
OFq/U4qK8/x7T0NhxJWlKdt8g9i7/35WOTr1AxjfOt6sGel+z4QS57GhjH8auV67HfKUQFqa0GFO7Csr

uv1G7MlrFFdeg5u5e2hKmButwxTtiOcBDy8NweyNU0Ip7LIiq6IibW+bmuhkVFY+xTxZimzpPIKrMvWJ

GUxZFe/YaJ3SchioOJQq3szv2LwEiiz2zhzAKWcycD
7vw+rBE8mep6SqYAlCvz91GGCO/EwmknIMj5HGt4Egs9MuIofxYIqMa00OciWmED450eR2IkfI8JC4Ab

l0CbepktqITojrGuiPdMTidaTFu6ohNcXOY7OQ1sk/5c76kwiaSz5U1GRA4MXdErtku9PbnwUiAiZDup

pf6ylXgBRSmmm/WYtP+c9NpGGP5pEZbjAJReqUJouS
HuS0Tq0Wu5ViL9jQNIf3YI9mxSCqJCqvbLv75r6ulaYDyoPNMkFiuNfh2znxU5hUV1RyWRkU/jGFh9qL

cotVbzyAp267K/1WTtOOq+U1ta645I3JxaeqMaCOCNDvDcSsgd1GtEtsWjcDYr976KYYIUgrvKft7xOB

eeOHu1rOi3XvEK+AdBshmjbzGsRC6VhiD9bNN6V+Jk
6O14eMXsQWeH845Ngspj+y3XcGdEI2wsZRbST506NS9h3b0OH6ikGQfQ9/zwQrqU4V2RMfH4BeVATtHn

xLHgEzfXEccoiT8cBPhRxST/+Df+dZHRfJDbEgqm9H7rOYyYsp9e9u98CVMSfXt6ePkdFzFxVLxxq05/

KLK1lcJ8ks5k++PhW5qTgnJo7173Xv2RdoiHqDRz6K
3RTLYVN08A2MxzQD47WjVW3peoUhpWbl+bzrvjxGh3Mk8GxKcWh6mMe467a28ILiNfoL9hZ/J/vrD3vq

KiOLo/MQbNmCtW8VDRFdHL0FdCE/muWoRbknjc3ZLWrSWtvji4fVE4RLZvNcGKpcz2q8hpTqMMctADQK

DFGMGG6TUnE9gd/CgFtgHzJY4biheezgpjWGbb9mDI
RLrmwSvjVkwy/7ThJVIaeUs+vlH+haH57E3MORDS8iLmJp9X7PvrP3yEgcEkup04h0bKEpaquPtdQCzF

QEgBF2N7eotTeGyMwYlr9KiN8PZw07GsssTpaaEhCJmAzvmtElru2FuzOz38P/OaddhuCHb3CXo7JNje

2VewtMz95jz2/B/GZunrIplOsLInWkSw1sqzpGxakt
uM+bFJycvYOvDRifcizdl+Occ2TkVoCdiTTngHOWKrPDBJrlPBbdK6hqPtSU8oAKk3VsKcChzVOENGlw

lCX9q41wGYF22ImW7eurqbIO3sS2oiEBTT683Xeb6iupzLTcwBlx2OfRRaLVNrtHk1NU4VrFs2Gw6zjE

kHRTqyYmNZ/ws2FWjvl4VoapGggf+3uaG0T615BX4Y
9373ZUYr5QDP8E2zWadgjQmz967fVHfar80hgOxJm7Vtz8QtRSkcCXhwRGB/5gsK89ITAp/Fi2guBWb4

UZGtFS0zkVRc1NveUGW1tMMEK4GHxS3q5NNg5sUc3sENCA5xWGXt5netZcWlw+p4i7u118a/7rnLPPOX

t81tl2ee60ZwtGyNxdmDYCseMj1G4TAqHPftJa1azn
fBok94HYRk5ThIMnA23XUfq5VAehqx/vtm2HO71Nmz1cqepRTmbUpOU3Xe0VtdElxBRXdcv7XTGJw20d

8ddne0NllnNUXSt/Bb/a7MS/+7orH/Qzq4zJRW1e8SMmfrS12yadBeQ0Ioay0dApNRbb/92Ky6z2/nuE

wbxBsR4BGdjDGjQ07W+G2tHiXkZOMJI0X4zi0xdVO7
UkKecqgQFihFe0IiNI2jpNb65HYl2J28wVd/Rj5oofGk2GyBAaiVkXJiIOLlpjmY1757zv9nonyOKYAZ

X8h8ZI0dCwLN9+Zo/NihQwm2k9A3+om7/H+bPFlpa5Z5Lhub69Wbi9ZTPqCO20Qv6OSiw9xQXW9FrW3U

l9pncvWVTWv/wvacU9o2jwFM9l3Dmbxy/A4/2i8ARs
cJ+2melCZPAvOJqzAKpZ1x+a6T2IT9CmdLvZ/gkYS7B6IETrf4N/PUvoHWC+XQPMAKSlH8s87aa/6ju5

oNfWe5cKb+F3fxEahgfWnfQztFvo4AUK8+FrTj/anHV6dM3/S1WflZg89PE2P4oECRq7KKhLncleaCgb

Yqfb70t8xCCWt+fcLDHhS3XWMgOlB1wueRGAY55P/T
hDbcdoODjz/G+4oFjn2/Cobydh8cozVimqn7jT+NKoQRR+5kcveyn5aM+FQMON28kMYjZ0EdULQ8+4oI

9oc3edH3HkAOCnUc1k3ZlOJBS3YlVkG1MyE6MkyrkdgZzkcBgl7wN/b5yHM34WjVbsiu7bIm8BSKV6F+

/fCu26OKHUeCBNiiCAjotS2waaoFf6oZqdGY2tiqb5
f4Zmiyw1nF0rK2QjW33+qf+R+rmVIlE/yPnm6ciEZqBEZmNdwm6TeBuMDrR/PGXRCs4YXdwnvuHz1dJc

AeDWs8BzXHjsU7SY1BzEXAMEYJB59xO3DKaYqVYqe5KAhtkEU0yeHTmlqcz6lO2mgLndhKV0cnycR/gs

Xik5pjEUB52Zm9Y0+qhEhu5LjH7fHQ0zAQmYUuK1Tl
x5Xwcn+sP4EspScsjfKWDDmI+H3V9ZNVzSsSF96SHWWrfu+u/KJYGClGxved5QpP3LzCkOZxvbfT174A

lTxUtoFz4v3kis4uw81tG/MGBJR73B9KNV1jCaTye7yOkav7rhs+1F4Fw6K2JH/N/EMDdXmPb96ImJJk

5SARoWyOpg5a1O2LN/+KBaoHEaf/Np3aDgyaCSfQpG
zGy+m0CI1BOyGb9Ix/FsG4RY4rBTdfLIUOgEIUpe9MuzRpaxXm5BKp7dwjRCxV63/aISo5S9I1sdPbzk

5bgEwoj9HpIoMTG1M/gM9EMGy9tjdZwmyevlYKFbK2Xq3aw9zNpxoGSCo2avhks+/QUE5PK0X1DlCyan

ZRYswX5NwWukxMyG/Tdsxx6j9JbptAvvR2hBz7asYx
bDdtHtIwvIsmgf4Wae+Jn2nBDt4ZEfkGCyysyDw1kUJXkOQfodp8m2W3omNYGJ7mZ+VspHDpfBjC1Fbo

xSL7YJW1keh30n48b2gFfIH9YhQkDYEntKM4CSqixNjId22rqut8wdai6wjL2ml9eqQJD8ULhxkKRw5D

mbCwn+V7cMV5/kbskudYiL1E5wJDW4sqivHQELTa4T
Ha1RyDzgJti9s8L+V4kubbH060yW4jUcyC64fxolqpoOImGtv3gQNP3h5Ap41tMa3yobRcWqn2qzJmHb

uV3+dSjN0+eQlD2UaWYOuqgyY6eTqGwZGjFsTTMWSTXRpAiaKzr233F3eiOpcViiegUHQe5Dp3Cs8mrG

kyko+xBcWmn28srd5YVS8gSz4rhuj/CQcRDxd39O25
UH0E9iA/mi99KXYkIcGSIg/xrCKMGJsTusBlPFDYkUf54gyO5g2zIvR8oCyzKieKEiLBW7Jx+uNKy9PO

7/xyvLR9Wk9iELIYsNeXVN4Ga3KxlwLPf4R++iX+5NHnW/0+NraF/1JzsHUdR50zBRb9kx257O7aFjIN

oiEbsG53waS+1pzLRdrsvNUJqIb2YFsv9qkLn/ZhOg
t2n+2/F4hJfNCy5/fNIRIUwCNzbk7Fg45CoixqnK/t1pYfRvvMw12+pEXw+nBhCafT0F+tOB7a4/rS4s

8KhLCgtCDdWQXq7LMipkiykHEGJWVxjWLUQyo4yRqc8KZmGSGWd3ihd/K/y8phhZrXanU7iZn7U9dsLv

kxyhxsLXZ9oioKBjKaW7ssc4TJOacK9ONyB4O9hUlA
Hn2i1rOqBRRkXvJTNr/eBF4u5/338yrt1XeLrd8uR7A/PcYNOawA4E2MdaItS7Gg7lhYj6NPLUHNmVu2

5W6HQe/y3goKDi5PiVKhzZu0DJfPNZC1REuxiWE9zK126ikqfJsW8rYrnkdGmqVxom1mF4NgxWmMSgDI

okVyqp9qKEBS7gCfvDK0/0JpjnGFr5NBbpGsQulZ7D
Z5/mtKP+SFKJVRu9EHpU2wesh7XHY92G/QJDHPoZxR+w8qaS1EuMKtYwH3GFAEVkRnRBrExWPNzHmPiF

HaAhKEg5NvOi0gKZ/HT21ZbdiXVRoFjxaqABOv4eCGk96BVZ2JbBTVeK9uf503XOwXjBzHO8dC6GskR9

1bXZLODs+xKnDRQQ9DnBc08hPJRomeWIOimqr+Wkfr
1f8WumFlCYWtb2hzPRIoKFhQzvVXdyucp11G+rOhKzkiXVa7Dx8CUer/kbHzOhVQuQzf1kQj2vqqBOZW

KyAg6Cf8epYvc76tJBSeBocPWUAEakerD9f0B4+1zmL606jtj3kO7zQJ9xv3QFmWIJ/YC12GojQNtxgy

e6i1mVL84ePngqw3Qe0Sp9/69K05FHrVIF1yv2xayE
ANNicyvuAHbjZTCNKdpH7/9FOMiDnf1JlhgC1GvaibcQZ+idSf5UZBlWCDxpn5RDPY2NiMrdTuXNe9N8

FF44KpPh+zqkVafDkRNGnNWPsd3qlni3AsmH38esBpVwMsTIZ3aAYEeFHKki06LAUmnQuMjKr2JvmbYr

tZCaJN1qNKya/GinPS2lIcaeJmYGYnM37CfXC89M/2
Cw3W0HS/Vx097uUAo9TCQdsuonI7TQoB87VLV1I9UVj0e3+T/DRNvfnALhn9tutcah3s1SFbtJhFtxzc

0NAZ2Pj8wkQWjU7GOdJ6ZCeGsRI3PL5ZCaYHRJkDDkJfWdE3og2bA0Y1tPArom3MBGSJGp7cvTI+7gvi

y+mX1+ayiJjSGkHO19PIRtxLQRqcBXvxbq7B5DMTm4
UeA7re3yNWh3f0pPvWeHmNmQn6xUM8ZT8pIT/u3vvEp4leGNZdLn+/gyUkkhMtwm32go6iwvU4YjTCPv

nVH+0ZijJVAMIxH5ieqSG18oYLs1oJ1RH+ATtPC0DcT74a3Ky99kt3nEkZ4Ftd9ht1z9wOVS7LKXR4sk

Sxfv+FNcM+GfRLBSK8tT9gOlUN/84H7nCEPOQFQqwA
X5vTBGK1uUmNZtheq7CkLj68coIsyNmvxrmOZcjA+iBBzt9NlLQubnfwmYkX7zTp6iu3Bg6eiT1xvkAx

aME63+by5fw66M+bZGziF+cnJFWtnPGfzQsFW91hJAkQXBpk/jncEzcjO3wD891XYeIyhse6Ahoqu5qJ

DAsf8ImHs3HalF9BTVyAXmfRsnSYvgNOwIUWje3aP0
jCpeVtaX90+co53UCznYr1CjsUfT+BEw/mRX3k4WftD/PD+j523Qq7gGTpb3VHTvFGYIyRq1CpZmVvkM

rXR5vTe4cakf9lUGu52KnIPQaOu3VF4++t05K9mv2pEk31IojzJvEwDC7KbS+ESwiH+VjFjFTKftlIYf

OT8G3+29YydZ1n2CHZ5EJF0bNi4wFQhp7Dw+/fBkeE
fnKhWpq8i3CZ2LuqXdCC5RopSOsyKYpX1wGSXX/F+VyuoKxVbabaHXGpOlZlPtLOeVYj/0mvBWrfhQyu

b9fUkiloxiVrv8X/ON1Zn8d21r4asu/qvwdfjQs/dbsFPH7n6Tp+VyDj6wExUMsKzecQsTuwupZEsHjx

VHBKTWzbyn1QeIN8ei1qA52o/+KvEYyRZuV1SU/tde
pKY0LdlcINQDIVR1BIkAyIo5FK9sI02bBKw3HUmvp+jO2sEX8pubnREzxM1ePfMHabbAmMmIKpjbVZE/

X1668uVnCqf+GKVBgSut7K0O9EOOA7qQ2rHiuAShqjqg9W9xsuuuXr0dZkEEKZuOZVUT1LtoRKH6efhe

Ws83dITjteMLpGkU3Lc+DLtWCrmMWeg5s17t+LW1MS
uf7yYvvLct7h4zdglBfraYGRGrq8Jb/6fEPmu2wrzrNh7Ox46DEF4zrkrnfejL62wE5/xmN1zIlcvEnh

LeLsw5iko/EWwZKh3VvR6he+3GvH8MzOVMHGL/ibw7JGS29qo/QmsVp+kmTxC/pRJntZppEettTmQKrt

ZIg1AWyOWT/Kr+y9NBZq1Nxtl0RnW8f/jKJzb48rm5
3P+xPQUdOY/J2cJ6WXo9JdQK6A7FHtUFYiUzd+zpS6uvbbE+xEivIOoqu88ZqwSIs9oGGt9fiY5EhalD

cUj+DuYt+EKZVibVUwYTLWdpGKKI2BbE4ZFcC0vwVS9Fq4OVlm47Sl2EHfE2qjgux4i6cJdIl766aRan

XDukV+gbccPYHi2dhFOPBfryMXCCSwmd47w1pz8tWm
e+AkC16LGkbaIFyZ8a9yaS1re8KYmZB8HsBCXC2bDi89RX1q58/skwW8vpOYcFZTrQiJdcg7We9YfY66

3Yuhvdyz1JX0orcp2M6DqVPcoxmsbn+lmWS9Wt2+L1p++7VHpajwLG0PUP0tKy320g5R5d7oPjixtCMR

JrdFE6Uyno9E8x7FIJwhGUFUaBp1epEtOnOzseqBub
JQfC32hHy9Kl69V9mMYrpUj8yoIg9ag1+Wte/SFVNsdPvLGDIMmh0nMCiOdsOj5J0fN1mlvr81st3Fko

+/cL8GHouskRoEmno3Sr6FrLfyWeBpsCYyt6M/uE+/czxMe9gliPnvITlpXLQlpO/YJnNnDR1A5RX/rs

3ATHsWf8ABbtoFEty5c+sRr+14fVIFw4vpzOGVzSs9
rYJx6gpALiLu01QL9MLXy4bDkai+FBcHnyCHTcKVeidH8CzUfMUik2gm1GuzgtGnTLLVnIPuUcuoHcJt

c3liKURJ0Hstv0mCF5H/1uHXjoviwSdGZjFN/UkjA/C83oZXm/vWiY6+nRZqojDZcCt2EEeNt0ymc2Fl

QdURpvv8kxjEt78f/d5y8hXJ9jVS31K1axM8gJxT/z
y3WzCjwZmoONB+aMP5tw/m1MxS6iNZD2JtxLWlmJcEz1DpEU6ky3luptmnKYlOau+T05ICzcH2/pcgeA

E3KjkPu7tfQbePZY+F7Ew0YoJk0rqnBfcB7e4cRbcdRJ2jtIO3oyCgQj1qhiszkkV+LbSFL9YZrhNfda

Zbi6px9xEgWIXc/9WrIey+NA8nNX7IEhXHAZy9eHgf
XqsQV1bLLORuU8oHZ5vJQhnTtea6QYs28MxCEKiCsyY+0Yc7fCdHDgh3wy/+5jZzG31N85qd2YB3/Zk4

XiaU8H8iMu6g5PXs2K9YihVpZn3bM3fv05i1hhVXD3J2RUqpoiAcE3qSYWSTcAVAYoRlxBz/ZR0qQNPs

BX70tmnsMH7GHfcJaRxFArTHiLmJZJiVQN17CXpsfw
5nv+NSF5wGxH6gqJg4MnjDaICmlz4Hs36D4Wo7CBrXM8AY8dCEBwINmO2XorPpb3tPGdeoBqvl+A+mFO

jQ7fXy43hDMuiE7Ww97pCkmVQPSnXB+ziIo6tIo/Uw7iQmrF3LStwDB0dqzukDQxSuB2wnTd8933vglq

+HKzrJEwnvNT0yYVlx5Ycmsh8ThEvpB5W3KIWLM444
vh3qQsCcL40OiNnTDX95U746oZwvNRTjPvZdO36rioaMP2tLUl2i6S5o/N6d7EaTeyVmQzGe+a24l80X

+KTeixqk6BLJt79+EstedJUi7n7riXiq9/uDT+68GLTGX44Lt5p4c6U0zO/dybJo1eLI4L62TmA8BnCm

402Z03NMIUEj9N52K+GaxsML2/B5YzoHFz8ZwPAeA+
WudsHhxdcwIynA7nslPh/P5dNQy3WbUHzVi4a9ts8RCu7pPrJVQ+s9VO/HBqt2Qjgo9OB8VP7bqkUsxp

CLWQLFV3uUHoGnmXPb8qJWrEOo8mA1D9C5Wvh2yfhTene6rBzzI2Z3nt/hQeQjTSnihFPHbm7E66lM1d

0ab7A3OpBGmA7upQn8iQ3txnVGVlCQgw3rcgF09Rbf
ArBJs1HXA5KG69w/kFKVqyXITnavYas9Nf2FbS4idfpO1AjKvMpdPd9xqZe6Dgf3rYvTfFwCGtEB04Wu

8bYAvG/58hyZqSVH2Hjy/8LgMEBj8Rm5LhV4nk2rhoBU88cOjsN9axHFRt5ZiMFtcPUx2KDWU+dnwyd4

YjbscBbg8wENpvFDqRbvs/zv51De9T9MhzQ2/M5icF
F5BiRyUlhmOO8h0CBw1CQnhd7USNfmeq6vmnRdDuoah85BDyuktT/ZvtNMogw8ZacEvOSzbgGJ6vhv5O

4pvAOKJIUMd4xXXPaR6hfvCSUUq6+lrGSr2Ff08L4knWdtbP1OmhlVLdSW7/5ttMbQ2rlDysSKrNqiUr

gEArOaZUck8tpGcuuLhZvRloRq2Lt1N1HbeOkwSela
xtQfuAWg6k/SXbCCwrUfhHNZMvJ62dpfAGJ6nn8/x1s9A12qkwLo1rPiwkl5P0IRysZVt6auyd0IIXGB

Hlf5b2ndQp6fDDcV7qbYGGNwr818RAfps7emlo9IdCXWtbnVuorSZNHJORAdF+oztEdvH8Z17+SQETwc

oPPIAjAaqxg3FvX50lRxEYw3gvNPZP8Ikn/E/UPdPi
euo2YBN/ydkstG10w3VeOr36TIftuDL1lFTYNotYSWJy7SE8DK3aMYnWaSvJtTTb3y2AQ28GYcmqo6an

runpC+HdpjnLSi92HJphN88x/jWCWz0PQaDGsU1mZFIqP+mVbtq5LG6gRzI+e9Cq2HTuPvzo4RW3Nqbt

tnR/wpCU6jK2NEWVSZcabA8fonZDs0ao+Hj2hMotv8
MeH0djzD0dLkuV/8Y6EF/NEcQUzkpfar/y+kE1tS86rzEyisRestSXVeuHNU2Xww55jB8UHghdqOHFdZ

44MMdoDvMho6wgxbt4lJ1WSzkyhUfSw0BE6z4zrkIqoc8OcHwVCT8qTVIXJNkNp+MBrHm2jQAXx5VBUs

cguSG403ZbCna3nLA+vsH0Q1mtlg69GfGkxtC6C6dY
IOxkuwPsQf/qZUYXXF1P9SxYej1QxcOcjBDjFQtJub6F32RIgjtSwxxTwLIHuks2bThEbtu9X7i1/LtR

OhyvNgdN+Lr4B8XK5f1kzZ9rkS0sIqKIkxH29BEQqswDgz9t+MJfBRN1hqVI6+kr5RKW34AJSDpbtiQI

SZLiz7x7/mc99xYHIGhspoemFRHR6FFgqcCx20VmU1
U2ajomuQjJejx1nupw/fNvUCH+lwKx/OPx5c+QldfNMy7pfJidxA1f8Dyh+xU3FOKnSMMy80zGatLYKv

8JpIkrA5OwGzu1+zxEzXkzrMPZK2sbpP4cFNeOH1VMeUF9cR4KpQI15d4lttIuudlzR5/JdXGYvRUpP9

23pfnbk/bINjA9gazQlpeu17ANAsOmiqDsJ57ohQEC
EKWudXxWRj+Xbx8KEnjYFgFyFeeb8zgsKimpgEPSQKHAoAoi86YEWMkIlxHNBdeFM7L3V83P8rf7AS83

wNwTFFJSdLuqjdJ22k56cQiMUi/yO/JCH83uh0nuDWznaRKgCnY6d0ttI+QGzHpChU9AKuXFQWIAgtND

XWRfSDAKYbr9OpkBiHTcLb3HvAuyuIbQlV6D+2I3TS
L7jtxiqCKD7JIMgr2x0mYZgYYso58wd1A5tYW8cVNoZru3lfloFBEhf8T53S0Gx/qtnFqIts8X5zz/Wf

zoTHjUff/CowyPDcUeQGdbrbw/+vGjU6/Q9xAca78sOZtkk76zglRkHg7u8SIald3byxJku3+afsAhRB

kGRyC4QqVqxyRgbF+LnBxFJ/ikbvMRpvu+NkMg4VDy
oqV5MCTur2XROWljxTFqixXk+/DQu4igTWDeVud3YajEOQ/HpFR+cg895dT5eR77pLgSnfM93ZHlFrrq

EgJE4OmugrIPsn9bvKCPlCstzewTUmX+Yi2LzKZJptNx+GLQTu1gUqnWB4OZWS5//1bw2t8VYiB8HUMB

JytNTG70SxhwlH7ds79MIOY30mkMOMcu1Fp41XOgbt
6z1QjEPPgi7JfgkEeVAR93XmNWdlZVM2/Q5iFAsez3vcsq8gyfIiXkLE+2zY6Yq1xxscWK5RiaaAUka5

UlxRW3vkRQ+TSUSOLvBCFOK8emcphzx9m5/txTkWMRtTaeW9pTdO5qAHdoAoC9GdC0Zqn/QqJsD97Rn7

6Edje4snxr9hNN2I5y3p5ybzUQBvbFeeZR9VHRRmoM
T+GYvEFdAqyUZWtCnzouCAlLoS8LHzFCPTfrOpoog2vyz4ZwpzOrbQasdjMDu/7MO/VHu8K0AB4MDdtZ

XK0yENU4IRhLg3psGHM7YnmperOcH6ogrvF5l/ecXJ7KETXrL1ptUjcY5jH+zhlh4bnAMi+Uq3jxcXew

myBNThghp3p2NL0Kv+yLnKKuku51Br0e9D8dQyniIX
bBsHemflmA9WCW4fKDuRQvy29OgWhVcYmwcx0tr6aIlJFiSaEYC/1pWkXsI9wOuIUufK4PtDAxXQnqUx

L7v0OOCh/7Hf+ye/4UWkbYQxSOgaHi5picwPWxzJUKJ7BbzunRLNRM+GgdRo61AcexGP8n/cvUld0Hct

gMTWLjpzIgSBkGWvCOlOHSE6uJjCK8iZR+bbCbvioW
Lzav4lQ1I2tjaUxlyixjM8ice94Ue8+sVJdt+LA/e1lQvp0QdtdnWdwNRJz2HdOxw0+AbFY5OvN9wJq+

UbTZld9HSaKlDFXloTXcfLDyoUPmH926KEyQ3uAjNQQ+dboGOv7PYJ+B9trDQdD/VnOazW+kz84x5te4

IeNgzF/sW3s86pzGJKdROUXzlo9NabMqmzaKVXDRrW
UcQQ6O5Zmgz67Tg+DFfZd/40h4M62APp0TlrpgUrR5+lGk3AS39eVQvqjskz6Qn+kkSH2WUo8NfPIjmj

He5zRTbI5dLnmQj7yEKTjrHbj/Z43wO1diA220NAoGh5x0V4xzZ1d+9uQ6AF7G8NBcb3QPzUAvRoVAnM

+SWq4NGalKnW8czO+JLYU5Jal4cc1D9A7NIuvota8f
/nAjd/fnmsMzOJv1ufEOAq2ELJfvtQfZSqr6iyG4cVjt63R86g6ePBthm41BSrls6T0IYHs1elg6gq4X

YkoAAdH4UQotfQvwCWoikTvOkl3jSJUgx1LFPe7m+YuINHDkOYrm+7XEMn+50zFS8OHiS1IIBV2eSwlQ

XpZDVVolaYn8mmnX1LNNjtOg2Kxf/ogyu/ZOlF6xEC
pzaTiCOiCxEFFCfBq23Du/ESlyIdfLijW+TCJLvNhgeWFHj/ocnhjhWMVm+CX+NW7YXhD5lATR5q65fv

FzOOwhwGol5Qs8IhkTyqyXEcn0/yFJ1ey0qpvQUl0mQjLM9dCmXg2vNDHab3V8qUgNhYf7yHwmqNZigY

mCLUvrKQFiFM66HaDjdQsZIaXu4Wf8soJttPQs/vqq
jxR74wiALEzPSBJEkpBRmc1icVxl5aCzy6wzg7fd1CZCmiGUUpnysTOkIPfiOHTnB8OxMzotwpBdo7NN

4omXhubg7dXyc0SOGRgVfTaBJ/Q7gPw2RckFB593PzCd0opjrwvwZrrN3NkB0Lg8FpAldTLVZ/bc93V6

2JQ0dt9GIKDzii4Kdo1Bb+Tw6kcxP2YwUMaTF6l8Mh
CJoFu3sKXyX/04/wPrsJ8JWEFkohGKrpODBeDotq9kEUxNJSy41Qis5pPXQbaA1xbNy1yVztN2x3it/S

AjHOcCuzsZ3bxDHJld9TaRXcSGivZvXtlp3SqTFV38Z1eYLCg5lOb/9eLEISlS3TrJlw6fx3q+VXFMPA

JHdcyyEZBauG/NUnMyq63raD9ICuSAIVQHWH21Nd4m
BzqSthWqafn2GspCNqa/DfjdMHisOppc5YMolOch2CLkz0U5kd42HI5XQRz/kYYMKfADWo0DZwLZD7Rg

MjAb057miCHZdYlrdmIWk3gMEt0xE4UGUcPqC7q236pTGIIBgg0fb8nebXWMs9GOPGMtBoAX72tMH+pP

Bfne8qpc2uKatEglxsFNpQHQ+tFbt6/R97evMTl8lT
2DRwzbiI1PCm7ZzQuwaVevHqkbln4bPQVY/d5IZXi1hGFO9u95ELOITuFJzAY9rvYU0hlypt2/klb+WD

AmyJI86grGv9oGXzfK8RiX2hgUl84/HTRUtTjPyJ7vU4t+/LRm4pdgOZSaAsJ/bIfFmU+fJY19EgwLmU

5va6023j38ub7fSRTnCok/kwwzERpipvF69qncGnAf
1fFTAegU+edward+SL5kB/xx60PJdqWTsHNcK6tsq0i3nXfCCGBmpzuM1+f6kII2Viu7OMDvrHnVYXb69utT

weweeDiRm1CbRtnHS6PFvr0TNJmrV6UbW2f0QoD+3nd7QktUUvsTVrLToL8QO1WDE0kP2YlFz6uLyL0Y

Viet+vLqMIqy1aMka1EmhaFPtEdRDumhGIZW+Fpp5CL0
ZAFBqmHDVcmHJNu6E7BuvAnVRIuRZNLXVGiUXt+96nYQuIhD9VOeUXDbRwU9hawr7w2S6f9JTUo/BTgj

1R1Nlr+81USKQLd56/Td9tSpXK6lEX95c9Ld++NW8We9eRJl+4wIamvCpiahPKEP1C9+n+mi7xCKxPUI

MWD8e1jBqglwjFk9wIabd1xUP53+r0qohbAc1igwjr
GTsuW4dGO/siHQjG03/kLMs4njehj1CweXk5TTWxhLHEC8IWMCuPQggz70Pg8M1ZhXKo8qDxJv0Onj7l

h7gyVHRsLNj2Fky89Ek44qpjj8iOZUPlob0XoOgLGpAvg/btAbIgeZ4v7I37MHtpsAKrS/kZMJe8jh7R

oD6T6P0/3kLwHb3LMEZonCwpRnie7ElsS1kQ7jrcOk
pN9or2Ryi6Ndny8D5Mgl3fVBz5+DXqKm50mYYYKyoLuHp62tx6lHHO8KQe+/VppA796xFYN2fLcFOxbq

l9iJmrkihqm8lggxKaNZm+gAlLh5wnsPFkcnwRAFhel3gogvhrMNNZOaFellpe9PTq3u9j2P4huYmWH2

Ge2ZFQx8mxQMrOdsDrah4mdEwn+/3pI+beFh7bcP25
dmZwUbtiwCHr+875uS0BKemGDHni8u9dU2OR+IqWrL6u3kIOpqj9vkyvxQssjEhoH9UM8EExgrw5/Aez

5HM7ADGh7035/cITgH2hmidbslghHgh3EdA3W17s6SCtALkAHQWDO2WIk0gLxtX1uHhB5MG+rwDt0snO

oXEyK3LsG/KYh0+5/2zouwREMEsIMI7M7uQl/JUa6M
E11wzS8bi8sbgL1UzuyR8A8/SJL/Gx1hR+pNs7sH3H7vQo9MbOzYBBSfMZC73h44yPHj4QckBThZoIEg

wEPCwQEwnBMUHaXvxuTMOv1ln6AKzSla6mtfdklQjUUKKGk7P42IYz/0b4n5S/AG1u7gbZ3KK/Y1bIDL

LodoV35TC8A1wBOw/9BzxPq5F2tXd8mWoua+OF7n7P
CXBYVSIqY3HyR11ESuMzX/Oi8sbCNH1xoLQEbe9ns/UwoTxg56uc2Jzko6PdD+WoPvpFiYOEssUoIYEG

Yy/Hf5+PLPhxNUiMTlotdJM+1kR9/T8DsQKUdZuTBDv1EMQ0claYGsidb6Zv8421c7ZugZWpRLAGB0Qr

OrG8dtS/mwy7MMTLXvtBsRWlvK91lI+kcGPbdQHAmW
d0c5btZaIZV6Xs6fwZhXAiH7knwxPdyQ66/DzqvRGnSNbjwXZutDE4GZTft/mAx6J3fyqLMDPdkrhjvU

a0DmupJO+CiclXn4hbC5akvdr00H+mz5LA+MJTy+n5pIhTTMYddw+2bIh4XlfkB1yB2+ahQnwy9EtHLd

/aefqNiiJQ+jqfPVuVfLHU6MckFl/j+zH/dOoHM1iw
OiB+hOgXRbQx1mMCc2DPOoDn6mhVtnlA3dew7X2N5/4BRd1n2tDArI9CVrAXaECwa6/AvFhCpZRL+8sV

hefXwhaeKxOiK3NTohDH2zVS5UJld/63XvXycW2tIOIpq31jgRO9sUxYadtbBgHvpDN+X5bidiYULDKT

IUIuX0LiByA9cJGlDQM548zjZ+v3h/a5E/ARZ9UO3+
ZMrb8zqO0dSBiUOVGVXxCx5c1tmjn/nZ7WyLjr+PW5dJOav1ikSu6ka7uurTSe643zDehbwqL1NDBaE8

tBP87+23X3DdsMf+AOcNkl/NCwnW3IZYDGQKHrRKVzpNv645yd1ax7dKB1Anb/y3PxBims1RpQKxCzQp

cwnF2gPzHO3eY+cRecUgLkVryYLI0QdmQhb896UlZ5
8Flig/uRFGeRnyxqPWfIoirXS3gskvoxuoUSGdEw1dkygBJwmgUBCl74TmGmPGzo1lzVg+FF7pkf1hYz

1UWlCs6r/niISvrwXUiqywvuiF6XAAgFa3O/ziozkVcVSOA49jCNWL6f2rqfIszOwVeOdSNC56qW1Q77

Au9eYsszgJ89/8t0YQrsI7Cn7W7jEfUeutLCJvwXa8
/TVldzdiO0HekxLZPN7GEQ1nycnKLbn60QlfXaqkj8EpdcPtL4nMI3wl4SigmYQFBFeAHlLmuN388VxX

mWKmdk4pEqBLLg4muzNSM4n39ePAhQMJNH2ybmBn5vrguv38gzQEJGr+DM3W9xCN54842wyANKZGVCBM

Ml+07QjBuwneWnIzi3NOglgb2FUXLuE2pgDNmaLt5+
8NhE5TD3E2CdShHXl95E65p3D2zLf4VCu+jQ6mBPjDeakRXK7gY634D+sHKiuDwkbvhQ9ZdMBB8vfzHK

GlLGHhgqwhoneY5Q34RYe9RmZBohbqcxnCoJFPHYQJjCN4a2XBrYX4/JKXCDkfIf+Tk+jTBdxxZTVuGX

PNgNsv12yTWEaNiy7Z4kSDviogLLKF5Ro48q+MYu3o
iApnJTgYCGSkro4FxqzvbxVk9Mz7J+/vK75lepGfJtH3uDMa17gAzKC9QyQqeUbPTrBrQiadJuhohVTl

FCt0RW7VUpbu5UvgI081y9wNoHt4CpAS/5wQScbouqx8Ewxw1BdFdV+IkDHG1YaRwJAMhOW6BQ459J+x

aGFCDcepR2xHpeu3zbxmMjcnJcCrJBDzjRwIKfjnqm
ILHI8e5KeufNlVTXKDfSUPngkCBWLWK0XLy9I7XrCauX8Dx6UacDG2RpL0aaYDutPwXxgLR7hsCECfE4

S8lAF90hoIvbiIjntoCehOlco36/7jiLRyc5tChQGyLwjFg3JGd/iD67ZGIl8oS37QsUTJTj5TjxsfVF

lOKyde2FPzvU2dTNMvtIoxjUrKmzaBIwUqsgUEG5DX
RK/kAMedLJs4+t9ZEYi3ka6bZt8+/s4FnHclerUvde+iyJ9EBp7q3H1BiWJzS1B3ALKb3gpBz4kZa0UH

U3GDbXUGXdaMHgec7dtNNKq8/jgkTZmFzfwq5CCNm0/b2jflmNSiA/Sx2F1sWyZBL7k5rjYd37ZwGHkh

aYwPGVkdMeH+J+JXyNqFapGz8n7cYlCt96HlqgOb4L
4jiED6sgiZpTZ+rdEi3Fk/RzBnD+kI3CS2MvMYKfF+D9/sdrRf/9nlnfEhQswbbaMd2eFFN9/azuMaPt

Az34C3jJqz65qCJEjuSYK5a0tNCuj2hascDIQ64r0ic+DZs2ERR+YjlVmEZuTVz0BDrC4WubkFUnNfy3

eg/RXPfmN7s+u84mgySLDrLbv5jJZcPUnwo2B/Veot
YSpzecVXP8UFob2txhZe3Hmx9k2RdiODCvo1gYTZS9LVjroQUcEHaEd6P15mHL6ww6niDf3ExNX7H+ds

L13aFlzKyc0EAM5rCmx9qVrNayZ3LCHh3Bv/4zDFc6zLa8y/OPL8zL5dYFA1bklB+zfdMBNbKii7ftjQ

8TefhjoHy8FoozTkduOzwd8FbPZ0mo12B+MDvv7CVr
f2kRS4icHjnBHsuPs2Y71bJKIEa4dwu7LCCk3d5dyFRZ94pRXHCadCXIECqCxt6BlMfzmQ8M3/APn+W/

pQCBmi1Pqb5HHhNjTvyh7KKgxl3l6Q2N6AjMJ6k+CGZ4F78T/2r03fObJQ35YKU2fts+lT29IRkuhU+V

l3on24oP0nN6Nmu5JWd+dMHCea3ZSPRGF5SANQHpWa
1mbKF8h4vD/Rvet8tncaBLFr7/WsjzoYv21fBATi+pgMjscOWrqj9c/wFLYhgSvAKgMNg3VjgLyW4Wsm

j5QzLNQ69Hbaxj9pxd8gQHA+hb18rEvLIE7XIysB1hN1dKPKud4OiHlDwG9ayru3Nan5Jj8JrWvJcszJ

ORUQk2AnmXzBZn1JiFB07P3fx907eM9u9xSbfEsI07
NfBDnANftuAH2oObOu2EeQEwKp2y3In0MUMU0kYMUftKPL329yQsFWy/O0Q97En8YRI9zy75K5njqC82

NKJZjX4JwIdedxEoJoTBjh/7Lvs1hS/J9JdwErX0I9y2ZbrNPztuQoxyNS89WVl74LNKspTfEnGXxNnE

juvNi625lgVb2g0EVbh6/wPsYsj8dxJ+aYkMkLjkGb
tnJ/OgZtYY0bEaDZ7IE620xzsxsh/DopcblWJ3AhYwrh1KJjjqPL26A+7Vvwu4i7q3+G3ElY0hd5TOgZ

IUrHHOWeBhg88XO3TS+tGIY0PsmLCPR838wd4Cof6uc32GQNcTKHXMpTuqWmex6pu3ZHxShB9XZHEktL

1K8WeZmLv7IbKs3IOOlZB4dlSQI1/9/klf8n1gm7HM
f/jezr4yOJ8etiM6Vrkz39+/VFNO1NBjbfo9+iq43WO5fG7twaJXiCrR/Ha4XLjkhxRgIqLExahBPhhi

ZfjMN2RmEQVe2axGRHwzrW5+rCOmrARQt7UET52QTyTUESc3tYkCbGTTBmiiVyzejzXlHhbvc6bPHMRA

gGPpCJt06xvREUSsHHt8wyaLfY+oC1kIT6ZIj1/1C7
b9ns+uwU+AxdpLToQh89x+6rLi/nUyls944ll1NJTT+6j17LV5D4MgySP9WxKAorXWhx995bc/k6fzTo

uaPaZw1WKs6NDdusapVxSYHYgqaL76g01VLxzdWaFQJEqQdciie4ZIaL9LsGWEtVjuh4MeXmn/NgMN/N

5gOEs/++xzvPz5GjVlNN2EDQNCYYhm51FpDHps0ljb
Xk1u5/caLb/ieaCdADdhMcDw5PR58JGE/JfRZ8L9HahapwqD7bqcUbmYWNVXOL2kjpP1aLrOhAzctZq8

Lm/ZL7oj0TOFtpntq447bVPiKb/C1nIwLU8AoIBVzEC3RB4Q93oTaNcIlMXKctpcFqY3+FIIitVRyYFE

bFt/jxEyZf4sThqa+NCybUwST/D1PXqo3c1zjm28JD
/0lcDCrwCz7/ATm8A6ELTmvpLQ0B3Nc/GlaHsROGxKtfLl/khNPA4UkTyJzGPSwU8f8f44AbnkqZy2S0

oOEBZK2A45WyzsZHLk6ep1d7iNN9M9bIXiixeS4Hxc+ZA4VjUAQPtWr6B4/C2yeXZ3DazdzIQWehGhdx

C6ST+2XpHK0/Qt4irLwhytVwZpqB30uG4PD+Zx3nA2
OFRtgb7U0wd3eZ4T/7ClZOgflrQP6nzA3CnzafLpNGKxXvVkmfg1ixPP3R8vAXOYlFLPcpgu02xkUfvS

GLzccnB+cegBN09WzBY+9S0usGiPi3icZxV+/BaQMqJHrJFwnvobngKEUHMu74qWup5PwJ+picTc+VBh

ZTlVHrCK9VD5kouu1srNd+sQEktUuo+zNVdh46NKn1
W4bZfENb4ZrI+zXzzJdbzrsu1HHD3/q70o4o2bk2ljQ12SXdc5Ngiwd4DanlvaxaZnEVHfLrOnPeNEtT

SWqVyDWfR6B85qg4ANf9lgBq+xnxLv6G6J0u2BuM6wnFvLZS361qQ/LF8rBwSZN+MI5+zW79PzK41y7z

CGO5TYZqp7JpmJptge0sCv48WJQR7joZoYMgXEpw8w
8xEh1RMAull+DdfmVYqHZLYkiYPPp5inajG8Lxo40WRWCRZ1tDRa+qvxhRgA8fYAl7onVM4z6jXxzCEy

ieFm1KS5Kh2HSQINVUS6XFC1DjRl6Zwj1+hqKyGV6LxJQ41ik8k2Ri/w+jpTcIL7ozcs78KJ9AGjjgCY

pylRdCqSISWvWQWrXybi9XsZwuDOp3dind62vWguoO
gOhEvReRThDdBeV1QF2okc4dM9ubB2ZS8Ai8MX+x7yGvMNtF9XxoXgLSGSVMsKd2Pl84VYzp4pdxJmGv

jHkQHr84B8/Pr7H+bHUWwuXL3PylB/JujxWltQ2ZFqYjtt2/eWpshjDUuYTZXxZU5AJY/fiqMZr2x1ZY

rS/NAEa6FSQHQuTxREdN4DwswTACaKDEP5VQYlsBX+
DoJaMltZNk3JEqR+qI8cCTB7PJkSYXADYi1y4Xh/1jyCWfPmx8JFVu235DLiZBmFCx6MbWAsGQ1KTK5v

+Xuj6mt43ph1mpNSUlskoUq7C+O8AnA9BNC3HiOUxNK34lbpPq2rgrnbW1WZEz0ewhcTpYVs6iC/8eGn

aHRonxhpAuXsU5SZYe0NaM8OBPlZ3fEfA9s7C1EiYs
eO01JazFlu3si08+m3+CLNxV0q4QtH/LMBDd99ZiTZGE44rt67p/Jt9hWjGB9hj6NchY+vV/kgA8Ga1e

Xz6sdFUgVUsi+oH4Y3MNLZY4jcx4fWUZtmTmFgbrmYEm0gKGT3uMx3HiXaMPpXin2c3PIw/+Rxdqb3vV

fSotmrze08H6lWRXGkx6nZLAi3S4vgHL829Y8fcnGc
Hnm8DAaafONmPrQE4fSADSQqd5wOUN02T21/Hm+8A46Yae3TfODb/1KwAINGQ+XC0lwC4953nRDiukPQ

pjFi/tuGYLsFE2m1Iep6Qm2EotWmj6H/oTKKPf5c+bln6NGzozRgumDPzO/Qmbtbfo/l4NX2PXT0S0/3

VH57SzXEekfNj1fv+e3TZC1lLUpzjrlnWeaLS8dwP0
1+KFK0dLPDoRWALjSQR7L3mYEdlBp4TmruYXNobbGU2qg3XFIaycP3+/Xj/bSDFznEDObTzlUyPFilWw

aVrCnk0/60/xVCQQdIiqQo61admgZOLDpI4P5z/M1jxZr4fvhc5Eq6P8EMoNQQ3hq5vsXIY2Pea2p6fy

VlLWrSxit+G1uH1hB9k2vmaX5hoosG6LmD9wPA6PW8
KzTweyDjEO417m3fkP38VKGyHDlyU/1asJmyD5vXZBSHt9mtf9oExaGzKvkVb9LIXF37kr2wzF0ASLXJ

TCYBjXKt3tWSvbMWi37BkpO0hqWQjV56YlTAnxi0itGku+U3up9f/xx5JC0eYVgwpLJHSkYKtCOacg54

7Vzzegis8Z7FVetF4pR01yxqErGZl4oAx+etQaNOVt
GyTxkcvG0jtBXo+etPXy1xYU97BAmQtziXS3EdWc+emYNU9mJk5APBh46S2V6AXJcDkqgFDqAGm7NnyX

src0WEi6ygbJfgMwYs3nWZstSMOKNArXS+k/ZeT+DdrkOZq2sYggyBuHfsGLAxd+yPawwoyHn8z+2rdN

YaPqydBsTGUhK6FqfHtfX5a+B5mpJwiiDercnH7rEq
8LvcbezpmbO9wMRq7V4/p9GKcv2INe2QgV/BvY7BavZLwZG/e6E5RdDyGQ1OrrGAHYZIloqW0Ir5OizO

VOsqJFrPWAiz3KCDlBGYuiDpPuprMWgJjGsV2eoYPF/jBSI7ju0Aa2ygYGgophn+57QSvu4QGRckXViR

rm/PEe3KgNoWAh98Oi8HEhMLxNXEeFXY8fUR189TJM
Q+HfcqBMI8F3UTRzt8tZv6TtMMLWSkATU8u9Oir4E5NNeZHVemmWFznfEjVAaXvY/xdcLVbbzKN0Ce+D

4/s5lm/cTVJNs87q1cnC33i0oMEs3s+5cG9daGw4hZOi7/SLPPTGmbuNctHmN7DrlerW37s9JxAfORDh

VJ7qLQ8xGhqs/Hzi1t+TMWLRUNNJVycpZK4dKCmenP
1JOEhKnN0SMwAHkwucwfzQ8WuonLGaAg06sqiaQv7+l1FLSezGWRp/ELijZo4jQaqYV0QxZBe8CFudnx

Vchx/h/pjUqFdYMTJEZ/tPBfIvA8gedXscDEleBnVm4Dr+y/vPM7JyM8IN+Qz1XjO1jtVVNCkbTAqpXZ

cMXhDYjto8zhwXZGkOgIxXYa/woOFBFvfxSpRg46ZO
juur62uB/7jKaGmM5yc2J2otOEEFIWEQaON1Yaq+rgYALxI2MzP/vDRhnydEfzfcmNdN1fBkwz/Tdf52

piHQjyBu6KnN1imCdAE/RvSWWlEqmiO4ikT2l0SVG02riNlVzCkU8w7BEBciWtXpW2LIjk81WdbzHJ9I

PJBkm1oS+pW5AVDxMng8obP3tf7ZGw4f8dz1NAP+k/
uNbihQ5gy9bDq/FMcuHXemGNksUy713mcmeJSgKCVGIE0h5CCNew6CZOlmI2Rk+HCTSfulf2pVjja68i

QRr3gK1w15Si+d9SsWoAwgXafFywj5wtv0RcrE57rMM8ZqS3DFRgCBPzRg0U6tSw+qSlf3tDhMY6s3W/

AM5E1O6mDziu2LL1Z5kAaBMdG9LCJCYYYTD/thyvsG
nnyQny0U4UptZZcIlFxtlpc9Z/29Oz5L2lOsDr52ru6A3KZZAAGvWYOJO5LXOjFL60Jt3DXAANCUpMXk

ZN+NbvYqfn8PtrSmn5DI9F+FGSxk/FakLIYcS4DO0PXcEE55grNdyp8DAatnwI5DOQEgE4YyQVMBxlcl

JEI9FEGf82kdiSg7lpuoAo3pnFiRuQGtUonv/yUpJE
nq0IZyh/jh4k4iZm9IzMwEPqYmUun1pHzDayoDlcpuYC6a+gIW1+9AAp0HlFlNGeNA/Kn7xtovbmVcyi

7kXcOWprgGSYXdUcnQ23fDP12kjo9Rzha5mE7tYBk4AFXWnZXUIcxIlYaA0bJlKuyD9gES/UyZmQ8GJ+

QNVjuvKJgLxmGWN2y1tkoZpJ9anT7o4vZNmMWqQNh0
q/ZPbTaNkghXsM4qnBwOU8c79A1sDIvA1Ixtc2z+qBIRA0qeMM+BmE1hiJTAs4/k+8puQ8b/m3/jqGcb

jFWoFkClKCqoa4sPZfJLE16YPMU/Uem7EkID2eEbiNRN9nFipbOEpE5deFiEdfd5mlFztwJDtNnVoH94

///OoNlcUA7NCHsAlpx8HZBbEkFLzTASjGqftAU8rn
go2eOFBKbnDBzLoq1vVotWKC+3occhEM9mJJ8zStto4SZFs/7SgXQxC+ny5e8Jp/SAcQiDNHwrxPib/d

XJnWStt2oTt1y/h8T/ho4Ztnia+UCy8gEmu4SIi/N/nBvkOw4mHNV96XGrJfXox1wx+OoWpviN1SKRJp

XqHc8xMJO08B/nvC8NSp8J6WkIvgGVdZQWgd5MMWI4
6SY8PWps8XJhZnx51mNJdF5lD5kxNjDUH3QZnRql9EbAjGANo+kVmn7OZhbazBv4JalAHcuesCbSBmfL

rSHdveaEKXKJQBQzl6avMQ9L/FDOeufHdocCLLpAGYSYIfhgMK64cU2fMdAikdEKvZebALCrRfqF7eiZ

xz+DwUwl8EK+85A7eX4qrIsAWYQlzvcVLKmMrWf/[place]
RNkM8GGrQUoE8hWT0jHkY3GXEju5bDHkmgM95JyiheySbZAudrVLtPGlgmHlG0i7aupLsiNA7byL1HQ/

vPJFGn/Mkhl6b1zhRpc7exofQrT0bUwHN3/bTxT1yZQXpNOlt8hUo8/iX0Kip2isPiKmn++mLPc8E4A5

BxhU2eQ1keIKd8de7mXi6Shv2M69+oKXXC5zLhZ08J
ZBcvDzqe2taAjkjmGCBHoDWdZARCgvOYOP7hteQtHfSYSjrjuHAtWg7NDse4g9z4zhqVS7ePJBWsoGWT

bExQogX8coacV+ivvVxT6w9vMd79MOUem2tuqkMPC1+It+LgSm63UV83i0k01v848xcPeP9yO1LEWCKl

pDY0r7THvrPc0AOU+ilL3OkeiHo6vIzFFYRqWdLXQb
sR4oB8cplr3DS8DUphk3TOjL5veVopACaEUh6sWM3lV+AZRtIChzcJnOyDjP94Q5toFLe5YymnrM2Z0I

Wco+o2FilCA+shB81yk933pFB1iOREJQYG/giifSAoTB4jpUalvKyb2MjqsNfsKKs2p6c/V4ozr6SbeO

zAR8S7O+TN8DwgVSV/rK/3rsg0gI/OMW4RKNn5Myz7
rFVS1OXLECb8iX4Q0Vu2+jz5x1O/+4STD2c45o82rfKquaaoVFdxCm6xyYo8cyUoOtqRvzi8eci69vtG

bhB8E5SOnqOjLvethx6Og3eos0B1RvyijFRyKvVqAqkNb3Hb6+myogEZ2dPqULmrsJrWtuLxLELSrfSJ

lGbJUq1llOBVy6uJEABh0hQOEj+HJlmT7HHEOnuj/d
XY/+ytX3wleqBB4OGpbAW61DkbI1j8e0ARWGQ/PRiBPxFV0xCooZC6K7ysDXnyM2AbGn0WlwQH6tK9rE

0XaTaet4J5mFL2pVxwqbRYzurT1HFCYzjRoK2zdb1xibXVWUCi38HLXDGNf/6QEiU0dyrFnqFF2Xr9jZ

zJkftbQhOoX/AqdGWYuKmpRtCNtIuZJsm+JcHIWe5i
iDeCpTv7T8FAvVB0DOCBbIDNcBvN1hCkZUIUYDeiAe+ybm/DBuow66Ou5bSHhqpga6k8eO8rVCHLE5iN

yYEUnQoRk8yp8N46HzVFBOaG+oeW5tBMZJruJE8FIXSjFeW5C99+GcliWJTCoH6M1MntwIM3rYkRP/+s

T5ClFLf5v8ooc1Pq9do7GVal5St0rrJCl+RVc9kxgp
BLZ4Ya1YRBOTi1QhsrVw/afHdeFPSNFl7mwGHUfG88Kh5yHktegrmvaX3tqGWw7s6aAO6qHntPl72Y18

WKMo8j4R3e1rpfetzN4KP803QtoWnjMsmDg+jy+f2fjh1f+3aX1PrCqcGM44pKOhm0A6kW/s+Z4ST/xw

vTyObkdeR4N44InhOOVzqHcPXKC8LOPv7KqrZJh4yQ
MfxPIPyMfUWVSc1IDYSECTPTrO7+A9VUZiwRQfqU1IKB/nxo8SfPyQEhbwf7Lw1xog3XiR1gPrruTMI7

mrzkbyEmjU2IYWjq01D5afa3eKuDtRtdDB11PRzcrmJFLZdrKOo7bmz+y4MfkXUSmI0NO3b8KKKIb50u

vQDl1lnt2Fx9DevG8I6ksbIhVcaiJn1siBWq2Z7X/9
WIkRUmLYDdn7cVfOemdLzNIhu4QyvBpFMiF4k4qOV63k6BoCI8hqRuraARgF/E0gQszHFGZAEon1D9aT

ThqP2i0IdQpw9zDeGjDN0ghTxi11d/fOeKnHpD54Ow42U6ON/RNXrfOeC4kG3fkl1sKNRKGXwL40tuP8

+XQubHjJDzzWNgihzdHSB61Tvl7uOJqSB9m2jilaeh
Oh4IBCmYwlu64B2ikxUNSWy6n8y0qMqevOWW82iHXohJ3eet/LuitG/KKFKHcYY7CvnR4+EvIl707gZz

wbDKfPPF/BsDaht1gAJxKavMREgdktzuglYafRPymvIm7Efp2Z5V9+It4xa2OrIdkkplryuKnHfQe7bt

Kodcl3v4jPBl1WVIt/uu0Eq7sGNkvVVC8yJGTr5Apr
vqV4xNsf5mAIcQMD/pPKy56D8f+Og6FBk7ynoQWtaije6TEUFoCUJ6cU5gS/oysdNy4gDc8CHwv29nnB

ldX3iQS2S/l9mhdUJCI5v/ftZU8wb2Jmvfh1969F0lLSFr4rTt2qah9bNG1lbgosMqAIsAHKZBIzfu6w

SRwOBsA1wXHwWfPPmKEsCgYNr4O04m7dBW2Nn4GfAz
ikcpfrg08R+68jc12wS+ozptJwB6JbI513gsaXlctUIOzNgnknWwRf/XW2+RsNjorK/k/fBvpb/t+Ucn

5EGz4obphNIMvdy9aZYyyqcismlAR6iEZoPIOHHk31doMi5A5EhLnkTHwcEw4b2ia9owPWrdwcv4eXro

J7l1vCSSljYRPq+q23+NVF2MXSNIvObhhsO1yIah0n
PWKbE446HjiPCrGesZOXxOMU+ae2q0JuVZIoSMtxtiIybdNyQiJKfIkcD3jpk92RDJzjIpmISvHye66B

bk+6bTUJD+4cl9KmUvL1WS44qvqOUlNvk3TqHePd855HzWWfs9+L1w2jdWZinvTeUhRUID/gWqovHnr3

oOIin2w9PZxPYX2F68K7UcFSCo483IitiH46Thk5CT
0k1wh+P3njK21C7mVRsb//Y6EnymU8hMS9oMM/NVGy6POqS7oh/XP1Wocu9qY47cE9Bf8oKtddq+7Ysr

JWIeGk+QJNZ9px8YUXha6ikCKgjfOraS4KfP/5N+pBnkONI2fNhiCnx57+kiZAzrpniUb6Qnx0P/11iq

Cpiv31F8oFHSG4v4BCk0WhrRIb6riCbcqAei4QXSCB
kHNRtcAkrICIG+/KYEFB9Mu0AlEir0nvBINm3QVh8vJU7ohd34gOz+v4H2H4bag+2JWpqDhh23LKeJtj

5ysZZtMP1iU7IR57+GN8Vmh/tBCwMuUXRllllKkhMIrNrQT4OhAUnuJDmjlDS8YSF7wpUdVjueE1UTa4

GbTt0/swWmgN+nowz+p73BN/d+Bs7OdB4GFmA2+aXd
cyEXKWQfFkprALMmWkMRxIfEWtmGatZZg1Evl+Y4GHINbZUEiqhyr8i/X/3LkJoG9pay5+IKoDZdDGhm

3gNbpiVS62XpK20vlxg8UM6A1yLnZGMtZmXFX3Lms8TZzL6o5L/ctCgRqPuN7Wu+ga8Spq3+EQlzRAuO

8OgNlJ+aG1Ft90y8Wkdl4DYNBjqYAtm/ofT+Ovn6HI
s8l7drqy3KoNZ6bDTRXdt3Op1Z6UZ7xXpOBKi8F/VlJB/O56rewsWcy65a8nwt8IXBo1ylxGusi3m+5x

CpyhdA0k4ICS1ll2akuk47Slj7cVYwVGzcuVdBrxKbwxbIxDkvM38xu35EgztRI0aMMnrKIh6T/RSbg2

Xij5swOmxo6gmn4H4iP8+yNd2FvTJqIBrmZ/pNriaK
Z2Xk1Ua95J4wnWtdnS73ZGmTkgOcv886hpHPMBxej1Mg6FnZgmZGkhGuGNGUxo1bbvE7nEhk0/DDBpUe

p/2hqDZFqw3S/SSRL5f42z4EzOXJlMUXnXv3mdIJug3072RCV0eJ1XX+7uYKocoq5484JGTi08DtSCyc

t7xl+vPL42QuqT4pFOMkeh2IrQ0bkoxd1uN6raYXKe
GcOpp2V+bEiGNNlF0us0fX0zPghjv+fRzp3N3nOjNszfj04U9ifpHvPPuzzJEsbEfnHXbII1WJFYVIyR

aCdx4z2WSJLSNn/81oWbzJv6mxzxqU/SggPe1fPPz4N53Qb29UGR7X6ZcXK/7pWhnW8f4yP7nukxKmv6

5BDkHaxlQSW98QOdoDYqujVF4XD8yfr/AmWEUByzX7
AZkRluTIJMzHK+xw3d78ifOgaE1kSPYESMjTkDfTBqOBsSGWemv2ypiaxYiCue2VkxWMnJ7FzDCygYJG

Gpy2OjZb7k0CjYCCG1ggwzH/sk8Y8BhtgPLZ8eqChTB8uKm+32TA30cTbJE9TcE/4pKJ1amKchyJzoxa

ocKjoyHYK9VAnNtsm9lUEuMsRKIIYiMP3DGb6CtwDg
g9kTQB+JfAkh7AlyTMx7XnxVTRGx62OWafcR39VcKSCvPEYF+GJsyUWC9p8UdhTPgni3vtpuZ3QzZRYK

k2NsV2ehyLw0JjH+aVyZBu3E9zg7wsCRhgT7zOu+W8oWjPkqKKi2vHt20eCYmfYCUQRTSnN2kcdaBX8d

Jdxb8VZhJ2ESSKTgL1uvAWR7OY10HGjnWhY3B23V9H
T9PwUTm092fF5TmGl+G3UP40oW1ad25nDmyywKMAxl7Yt4JBRZ6FvXmbKdJE6s/cbr3C2ulnFTgymGQ+

LZr5z6KC3Ib07/okWi54x5xx4x+CdZaeO/BspoTZ9QJcAfWLp9dwTYcNGU8EIdgqvvMeg2W16RSakrcJ

ZOQUGgo8rfVkFMbIlsEkt1NLFAGxWIl7enqlo2tGYr
VoMwlO1O51HzHUNvCjzmX9N7KHxP017an8fzdQhejLulyMbG7131gY1+SNgtme/YPd+ElQcVJBcpx4Me

Il4qQh5VjBAHTIw+dQ0CsPxuJr5UVc8SlNBAUl+y1rVPifsXzH9JDXLd7tHilajZO0bUBYsnuUUgKus8

Rc2k7l7wAuCt8IfLuSnLqsqiI979qFNiUj1sfaNwsb
57I4lnU6a2HKM4/OM+wTNgUajzogR75RWBMwaWxlXslibjxSBEXRlS4N/BpnNU20gamenM37yWaA9Quq

tcXSZGBxmZadZXKb/O2qqZX8ufXXBQrDQYSWshQt1zZLMN/qfgvY2rGqlovz9pWmPZomY4XTW5f61ZS0

f4G9/a1Rn38pBU5leo2XreBIqy39PMqhSARWewegS7
qRCmW+ZA+Fp5ZJCElAVof4aYtV6k7av1h+ZBS/TIIuYuKrNUnIepFZm3sIjQ69zFv9MFlflIs90T1UmV

No6FhDWsMxjjl+145TH76EFgDTZbqdGw0+1mlZRFFc3JFooo4oUDwujkEmckn9hosG/5rIaM923TUqcl

lXMQ142rcXLvO49HmhBk5Tp/7xxQUqZHi6hvzlnFrM
OhQNgZTkpMccw1DpI9gU44wBiQHkiIIBiyN90x532mJi6Lf0IBfowryjVxCsK7Bfr4WZTITSSZKs5rrK

S68Xr1MQEL6UNojWiaQb9OxN2wAMe/KDmifZahCyAMX39/PHjAircjhf0qBE/btLylvjHkZstQj+MXhy

Avm67QqEQpYz1QDjPrvGTd5UU7f4Sow1lypSn8adoM
BGiyrBsfaciEBn7njZRJdewU90O+MSmK8zcuuCz5ohOJWP9AAwpmV//RAD51eBSlqSFa8o+D1yf6By4j

JXOh17IMFy/VGQQ/Ke02HIbi2qb1YS5rh9250IrPX7MUXOop28R8sJfUGiZkcI23Lfm3SBMPB0K6Be8Q

icwNQb6jZczHxG658k1eo7tFVge0axEs1JxhEiYjpd
VYuJ+nVYLmmL1OUOtyB17G00IKzcmDWEBfKYeqnYxvxJ/nn+eyXcEpFF0M2vcO36kJIlsHAJp/eD91y1

Dkk/XcmOPqRJ4TKci/HW3bEljR1wOGD6bDMJECYbINxsMIZADrajEnfPzSGDX44sZgflIu1QuVp+QGAI

NSr51Hf5qGzx4itVbT3bCmupNBWPgVoV22I1tU+FsU
snmvo35yo9tlzs0RfVE2037rvuOqe+LBqRuBsqEL5HDTJ/WEW9yr/sADydLYiA+xkOPmR8vmoMYHpCnC

HdAqlL6dsyR75g1k9rT+uEc3/Tm66a7RkaaGRdVh4aJnmxnuZTmEWgoQrc/NqEMhFXEk9LKZXQni6kCD

wXvFBwHOn15YLpV2SDHb8u0AIDM7EVASQFc3C9XYJB
p4XfA9bRRuG8U3L5d6HU52TWjtXn+u+qTYhBNdL+0fQi1PJJ57W3LVQPXSq8LfLEUplfV7Rf0p/Go4Me

dRGmUW+xgyujoReW+9A9B7rmq0zM+Ls/C59MtHMRjnyaH2eM0UK+TG2mtgvG53b0bAJe89AaqtviHOzy

zL0gU7GHt6qjMR0H7rsA50/fr94Fo1k5bmMH64G7wq
UE9TSKl5AhSeM4V6y5NEgcpLVFqlN3GncRJnXYM/+4b0RgUKlET5endE4bjb2RNLoG5ru5IgFlllq9hM

bexAQ3Lik7Q42AEAKiMHGXHSI0SgXMkpFWSk9gsfYxMUpMsMxYMGSLdSKmkm1kCpx2nwmiRotSX7rRwP

r9Q0hDCzWtT9PTsGboZtTba62+mqhXzIkjWVXQ2r+Y
cnnx/UObRWNgoKUyPYQIgGVm2KtOiFLzHJ+4A/75bZI4480dOBEWZJzfH6hCcjW2dId8KCm6Hb7faJju

LHxG5FR2w1Oi1w/Sg4VKdHwemUzyO8h9kXodNXEX1U3p8t+0GCJG5kJlflRZAddtwhHqLgxz4jiiYq/K

ta43hJuvoPttmDPlafff4oo2NFq2SB9eLfj8pVa7v0
kOYBR7CG0c7pEuN6E6kVDfMKQBjiESpNoWwGL8+Pd9k0srIqt0wXvB7+LRqTjHq5Hvq1dpskcqBFu0zl

YyWTEkDFj8dCvMh5QSYxw6Q1sNfTY2p6TpDubzpXmHLZhcaii8VKopE8dS0ZV+B+/cVArNpNgIrTBi0x

y+F+YXfmbAfU0BUd7ezzlqBuKGWho0hs+8uFVL4raG
qzNBLc6grsPxRLtnOFefnG0+IHhrB5D4hzyqd9QsmFFCyQBSGmbfOgISRk53LKJe/w+rlZstD6+5sFy6

zsvQv/ahdkufg3kWiwwVBRcTXfvjezjizdpvwUdb8keZXhZ+GeH0tPbOe+p143IUGh/+jZuyo9R/aY71

vrcqwjdYmi0yIlfJvbt39la3gk0FlwjXMFV0DOf5/R
oVmLcztHwsEH7/8LXs72WoozsF9mx37tOUkimRS0b3fAbMtaCS2dfOdT4+UMYF0W/Jkjb5NyaAQDQW1d

ptAdohRehHzvw6FWoqiEmAU0nChDaXfFTJKA7UjLg1lRWwyyZKM0tsSIkSCyWbr0OmZ781G8KgLL/PTW

ZIeBjoCZ2vYUw18I/DUv37TCg5IC+z9RQG/5gSV8v+
691DkgMOtPyHjTO07pdJNU6ww40OL7mPMT4sxpFdbhIJoIuaIATD+89LE/vDwJJfpRv2Yv+el0VUzW0A

2rkRAe3waPDSjty/cu17yU7LmFsJRbSMqz5U478nNIYXTnblSVUSJ1m1gMzlfG6HXGA/c8+6ynLFPqe6

LrS8/zJeGaItLCNC6LRDtJYsy4HpqABu17YuBZVWk2
l6Mi6BP2nlWFuXD5U3oLKJ6Cu7cniYyQWUMrmRytUlNd9q2rhhscjFXZd6/z4yXufZKR8h1TQvIjii/b

mU/CezunPRIp7O4AWTrObpAVwYwkbzf6spgpBn1Ri6ujHxrH8n9hLgAvM4Rb+X6CS7+8s0vyqH+tsNwt

iZh95dMNgWDnhdkblQgDuJKjvGKa09rF3A+B8isQeB
PtifffCEfh7dyq5uuT6lF4xZXi2g+cnOvu/AFpX6gNg+QX3smhfLe+2B4BXRjxlmEcv/OZwdjq7rlx55

puIsAD3f+J31shSmtMlxlErJKorWC27XJml/T4O/YJIwzIN7lYK+zk3Rrg581Z6JjaVQw/AIzIol2jdV

joEuAM0Y3A611n124FEZCI+MvY7eGrytFdquYG/E2k
KreCVdhfcKBkThW6knItIu9uerRkOVWb9U0fq+ar7KvWjHNesv4owkSegKLTHWxEwtNCT38hat1vcxUy

hTSIPQVd4GdpPx45sx2WQXZeW0orHlo2s2ogVwuhFgymJJlsDC1PT8jj4+eAgXgH4r3+eD+JiQ97vaFL

nQpKodcxII99d51cmf1i9G6b5g4WKUpj3AxGMY15Pz
0S1HOy6XlyeI0fgsak09G9XUF85LcS2C3+DpvxfBa0C1+Hc6OadQ8TgZG/IZMTMk1xAe0DwzhBKW6KO/

7mDD8UR0VrRMZy02UGpl3xE4/d5OTOcw4XP7lDeli5FoqHe+WeMtMrJr+DDj7VEodph/oZBSYr9DkDRZ

Ggoc0L82G0VZjc7yz26XpB6MWrFY7Z5vKbvJ+XVoHH
qf2zFbh3IgoBpSnE6P1Lok1FpAaWI55WJeIbc6ySHsrwIczVp1NoBT/THqq77Q3e56v13BivP4hQ/m75

d8u/W/7d8u+Zh5k695yKJscaqqxClEn1usJTDywNk+n1Brv1/81ZDF1S4yo2nw/OsnlHAA9kLCe4IKLY

9hfxUlciXkNoKb6ol0s5UQd0M2ug8Yy9sGJ1/uiTCw
QAR3nQsCHm+6Sgi2sZH/ZZHI1tDSKjjLdcaFCOmZnqv+nC+btwDopEGXqQbxdV4mARG6ThMsRbxna6cM

2XbWSq83CjnimcGOHB2eD3Umyc0YksX/KlqEFG18slDJgkK2B+i6vVXrGxQxSSTaJ6kaQ/KGTKPHTqM3

tX5vGvaFwxWyYc9/SIINIh9xzuhL6IFQg6Fu2otJhI
wD2QbESb9vqdN9P5ISnYkMAeqpxQVr6pzs/tP9/DI/mt8dRqbcBLiSyi327QucFbRnj8V3Y/5kuMLuwV

825QabhG51db+yz40pdfybmrrziR3WGlnYbhX17wDDu2lM4nELAh6tA+dcRU8aehUsz9lDNJftkjRQ2Z

H6eW0iP+Nm2nDeCemchjDSxPbzO1kTRn5vX24+GMZS
pe8a/uIe5FCMs3q6TreYmqQrsQpPV1G1jU4CQchBv/8W0UW+ESA3mhmb8QZj8Ezy1LqG2sOpDavntdhW

GjPUlU+2FtjOPHkcesm+9XrMrmwYIZL8vStJpZQz69DiI3I3edXO9AC6iUrOTilfgXf/PPUhjoP59aW7

+AW6I6UHEinBaIFcYtm+AlGImFK/yt/Sv6E4wfIjlw
IXnj3NLRGbjqleuNIDb+Carlos+xsBwEc/6rUCrkiKb8g+Opavy6L6mTpVVFGMQ55V0OHXhZ3LpSKVSyUij

bFNZJg0mA77BXB2L6YLYH1NDw7+Ub3/GI+yTEoEoVj7LnDFct2S5A75Lu5fv6fGE6/UcAMTr62W+L85X

zQtw2CeM9iJRegQJ1xPgxFhSujxbCzkJ7HSqDZp0N4
rc3luupoGF4/mfFq/8op3HRlToHVS8Ol8vn5hos0GpV3M48t4+McY1fAONZP0O30yWMtlmAhSaFG8KWY

M6mrMjXlw6c5EagtqXqHQL6zU3QnpR6csAx/mlz+H30JAorLAxxFY0pVo5UWNuneaHm73lN1MBP7SFWs

aiVqj7JsL3ThalYE9U5Znyz4uFsNaqIkSD1yVNaEaY
nSDfuCeMmmzucz2fmS2X9lPv5iYGLIHTsxBLShf1Nh3engXjXCRygxK1DSgFiSTFp7kTKKbtoV6q51q0

uG29WfCSh9j10sHW4UnQwZ/9r0V6jptXVdOK1m46r9xUuRvKR32uX6MXk2tKmtorgLpn6an4/Jqw4i+d

BlE3qc99X8PF2pMs1mfU9O3gfRz+0ccp5Hc8R91oEY
x7H9g76LG+dkST0bOI7Uq2aAwMlfSHR7qKWlpORjnuqcAcGqNDBt12g585qgITe4By3RZX43Wgk+u1xG

uhC2+qkYLIzh2pFaH7NqcnkYVm1dsM5ZUcjcmDXVlUd+wD+xWnndxs3Wtlr/jkvq6S1TrUORAHz2GEga

jGkKs+E7GyDnxeKPSmcpxZCgYYVnAXKOPRiPdDouhB
BYYNR4KfSRBFX5TBfq1nsmicQegWDthskMXvs/AyHcmJmSUgmqnuzkMBDWHi0z5iXykZ49VaQjmHwgRf

NZRmnzYMuam/Ppi38MOQ8dFhRoZFYPhYoZHZmJ2fR113Tu1yZT4Yn0pYoonIdO1fPb537i0DCGAOliog

JFEa57Ns7mT0T5I/SArCzJipHUdRCTB0jxKE0k0eAr
2Etss69nLip8jAHrGmFHem9PAY0eVP3jD7Ik2vdtJ2/U9uJ+204G3Z9wrF8e0CHPHe714Fvid/ym1m+u

iRIFDxl7MWd5J4DWiyj3ljBqBJouZNTSeYpcHEfIX/6ESvjgbCAvolACC4RwVk21aSkLdmwluDQsgcUP

kP0qRcMp0370Sk+Pio+03H871LYDC1GapP/z9qBkSa
CrW7fIZ39hemc6KcVQ12tzf7emV9SLPxB3WFhJAj+8xkohP2yyW1z1Vkz+UtORpUevmyIjpVR4ZSCp33

g8JI2E6IQ/Wh2NamsKfb5OgZGw1ZkAV/R/q5mHDIlMIkxgfJuNl0mXxsp5Cs10xJii6WfqFrVjn2+Z3d

zMvN/kCSwUUV/us2JFntsXXXUjtxKTZueOL2B13yTJ
+Cphc9vEK8KjwbtrxctyOzdKkb+Tk/OkQWUvDAixP7Xsck5xnzRvvZCSJWGNo4lVy9crooYcz0e3v0+k

ebhER3eB4Z3UEUsCJMe56eknG38scKAArJu/6TunQqsUpQdcWEgIte8/5QY7DuA2V7I8QIPkKNsBjfdn

ICP2bwtHbofxLkzESsU1mXxFinyLN8m9FPhw6BCt9g
ru3xS6SovOt868fMpUak9VWvPS7NvRdXuCrSwAYtqR3yFL0E003dMdb7sLFx3ViYpowogiqU5PSoKZV5

JEC/GDM3QD1cwZM7cKLU7bFhfdTzxO+vj+IWGATS2+k2jjXkcEmnfCyI0Vigt3mAmqiXM/8uH+7lC1oN

Zygb5MTNs8HF810WZm8LaGwwIKUoz9vtM4vE+OtYFE
j5pfj7Ii0FK7R+q98Xx0kJmJoLn4jWIwm5FrE1E3aIAb94macYws5T2ijcp6epllNqbv5AauP3OA5KOo

10+I5o56J0GfiiMdy4qUQQfS6AFcRbmsWZg+alF7XBJBOVkZjaa0QveuPbsuwyWTWyI2HM6rC6XlUyoF

6EX6+agPfw3TKqylcCb95ARDBL+9B9je4HzU1i7YcY
TBLpPLGGwj8zJtFIUrd4dy2pAbWhRygwFS84claNUx2wb9/OljJiR+28Kr/bg04Mu01tSTiQtAx2zPni

DFJG8klu+i07lhKC1mJfROm6wphETuUZKVfY7i1xmTMfy0n63gWtQK69Id74zvGpMm7e+dbhOK+IyZKm

p7jhuw4kUkfwDgTe8cHMs2v8PIa35y49KptBjT3i/z
C3/X2kPnSMRceF5L4jCtW7SPreY1hKJJQXU9ltddMNIpqr+OppBtDdX0UJtkl3GhmrXZLf0IGXG8KmZ7

OSuWzRzwWDHDaF9/j65U4y3VDwPZPbtNmrUcSVZFTi4vjN0+1PNRCTulPzKnkTD4XgW2ZfyCc23Hm8bo

YLs8TdyixWM2vJYyqBQKq3S7ZtQxBVDrlij1SApWNV
SSr9LcPW4SxrRn5rVTLY5jswVT1mRs2XX0BZS0VRou8PPqW+KKs5JSI8qy/x11Tmj6ZHFJmc3tNU2nkl

IzrWVt1rpsKdlDLoM+BjE4ejAT3XG3FGe4w7ogwUGKxsqiti9e4S+td0Rh23+5QF4e6ht3mPtQLYC3P8

as5XF5zVVP0LafIHLeFrXTP6+RltsSKUgvaqLn7YBL
1X5tQ6waRKDh5wzYkmghZpQJPUw62jDnEqnlQk1zGApSEFrxDaSxk81qWGjp9wvym1mEugm86iFy+yG+

gt5TGIc27+IxXlyeE9cG1HgL/il6zjH3QtzHOyNE5vnaVs3wpsjH0yw6hpVGXWxK6FpRVI6UBN1whSYw

fUxprdHt1k8EDEKSU0c5yLRwBs34Ersv/mNLE5kBwC
sNbXLvIIqS67H88+mz9xjPupSIaz+iKi/QagpefnBYJNacUtqC2FzR6mGXFsFPZWkJkOLMulp+Lig55X

FNxgc+T0ZymRe57l9SqMa64FF6BPh4BDiJj9+a/lQx1HqZaGfYJKHL15qcVfEKBg7wp7WG5mQDQuSkM7

uLs2/mmwcSnVpW4AuTeQpDaUUew1Pfqsa5Tqr6U0V1
mm8eTrG4jL6twccDHN4maXRpcx0a1Xu1ySspvE7L2cB0kjYcUKBOv5SVOz/Z++zwVGnG+ChE2uMuLiAB

7Qs4G3l3Y2fWFdtqSvZM8PhsyexyW+7e1SiZGzMhGSOUKcrToN33QXQt3jzMwRJD8jNDJViArvapqEVq

sgWWQG8iJUQCUrQKWlGR+nUaJ3xMfZsmoxztD+bJNR
hO0mG7HW8YKW+oMC5CPcMCdnyRr+SH4BCFcDVs8VR22boSqevMkwSEfcPyrzuHiLb/hPy9u+3f9/+uGz

/x6CBRvTn4NPMSM7rdH0PJYnh1n+h5tDAuS7GzTl3VKe19bMZ14za6d3Bhkb+Qhx2ai0Ny4a9tBCBtqF

K+EWuW+xAsZ86LAJqx5FQPFIFS/rh6saIgbPFcOGub
WiJaUiCCB9KVxn0WeBodCsZGW8LKOVnE18ZtHUbEX1LlLN4Zq3wdh3Wv1tz6XugWqKlG0xw7T7QRzXgm

o2J55fFS+Klr047uHkXO6KxyvwORP82BII7Fnzg1K5siFm321Ep4OsBKsFi009ai4xCKOg6wI6DKdLhm

I6fIQXWz03WemB7t4y7MbYsopgDBO4ZU6ZJCbm68WN
Y7Yjb2A7bcaGQG93o8EB4mIJuiUCT2ONXhBmTOc0tn+Oq5CEd/egD+Wyzye6UD99h1RDN1PBdaWE9Fv5

nY4x9GG1SawmubMGtAbAzHG4n+Ebmv4duGcPBFqvIlpdjxqTi4ZuA1kLsbUgSeUCtxUj1YxIEQbyLe1P

9wQSqNRoHTkn4JW9KPj5bKTX++w0uHwM2vv97fxJUK
FvQrBPtBB83/msLHP0YRdfU61+MTJNSUTnYB7U0YC1xT6wLGGGVJdjJQsk3pJhHXMYDODew42eWulOT6

97sUa2QZH7tIYLgKi7+3ixgOQWMSu7TU30jbtCfFe5Jo7U21jZCxdp79Iqf9bjnjrXbmthF66wRIKc0A

XRBRNUyg0MyxyBjNfQVf+2oI7OMWZndWtaojh7Eq/B
k4tAJ+Ir45jXTVdWu2E9vs+EFULdQbwEgsN3mMr32vfACPcHN2uncqpkG3Nc5/FshshHX/Vc120Q+D4/

1Q2KYn1gxm0wGxX5sQEg9Xh//a1B5RQjIWwhIRL9COXHeU/xgYvXB6nHoU13gTbd4ma9fUYfqZ+kBsKx

qTNg8FuA9yzna1nX5wrO0G7nMFVRUwibe4YM3ESz89
sxSJqcQT4GNp71sIbFAOpdynO2ad+3A1omhuAmh/Qq8Z696yizk5s8Eww38f75d3dDtaY+g8J5xfI1c8

wlHGWv8klqzXG9THo6oHY4tTz3Ih7pL+2vUa7UvZxKvBUVcD8Pk0RrML0WWvEqV5l2Afda4BiHfiYa+u

[id number]hnG3ik4LiJt2j
oC4vqrNu29fBflzlTnxUDxR/oZGBaevuOZLzyQvAJuVtqXatGQMrKpeMVm+kSC8P5pyHQWE8rRnPzdt+

eE24Yra3yTda+POToOuOd4aOmF8ip/Y0a5BSlBNmltL56dZYvQhAfoGE6zrzvtFcX74oEnArj1yF4riU

Bl26WKAh4eFSFHwE+sdVLNZ4ZBH0AtprJoIl6hYArx
ml86/nDpaXdwIsECzFEZJTsfxVOL6tpumWoN/QS17UtT0jGFMX20VOx1t3S407FliUzHt2UtNT6Y5YRy

cKcT1Op3xoYgKJPIKSaeUmz3Ar3RyyEpH4C76UE0JEgm821czecHbZcTF492Me0m59MUdvIrfD7CfxkO

ycI+YlL22bhL5OafTxfu/MVxNdff/x+b6FPnTWzMRh
sNfwQkt7LqZ7w+RGiJ3PYzZg4ggjVgYPWttfHdBwYH2hZP0Z5zsHz0YbOvhUqDkN7ObhAzoRyR6ll4du

uzYMm7ziOOusjrAiA90jJkksHasvXjc7iZus837YEgf8QMhYGJ4njznbSAoi7eCWUshagwdyqpnnebQ3

elp67CMQRo/ZmaL2pHxuFVaki9VgRoTst/zFjTG84a
i3j1Ge+GRFrRk5YxRxJx+V4LSAj9cuCfD3qwFHMIJk0xv3qpe1HyxSv/PgHg1VAzMgAllxg56b2oi3rQ

pWpM+QUIA/ZFUWja3FqzKeww/9hfPZ+KEP/uVf7XNcX8bAOFnmaeHlvSh3E8A4L8WUekDz5U55Eg8rfh

espo29fcwE3nkUn/qlkgh+jz/nhEd3mA67CxaB67Ua
QqWybSHtl7Ofri5CLnQYriBQnUXQmaDW7g8mZDN2yzOeTVBVUbp5EEbBZlrsrfKEQWPBtuOr19Bo6yOy

IiCT0o2h5Ybtfft0dk4v9OeYGvx5clj8X4mTtdI4clmr/8i1+ir1oJ2/JtrkdSiM+9yAILesJa0aVp8Y

76l8ReIMeCS5fZyu3E+fTowHbUJiyvl0+dad+s2MIk
syvkFrTayhjYGF4YHZcFCRf9XWKIRHuuAWMLEKz4Y+EcFWvvRikdPnYjjT2FI6VagPANvz+OhZatkX2q

jk3t8bU6EFzK6R0i8w4u4Bwvh248pDti1toncx0CVUHXm8zlesALzRFadVY+g+LiVOOQhulWWJudLKrd

2NPinwG6MtkuuikmIYclB75TW20KkcrRMLWLJmO+At
63e87cPsxNVDkuRE/wh86ZO0iTNNs1iqb8/z0Qwj83On563aXi2eozbhHcbRme0RaWozuIVhkWOsVO/y

9jWUAW8GbPGN59LBgcdx/y4rOkBg7xYH6TMuqobGoP5ZkCdMVR7EicH2GAi0YL3tG6DBtLXtCJ5C1d4U

siyNVZVZaROWxV44SmqqyDbGYmlD7DuhPQbfjQyv7o
FwxTTJi8EK1Z3wAnIURcqKv5UPUhNHWnc1izJ9wI9VnJoMWdq5mWO76J+IUm8Ttg246pZufTAPAhx84Z

F6rWdQ/TS0/NlDxQGq+TGtq8clt8JHjrngrYhDBhg0JSyKcGnmRcekVAq1zYpTR1SqAS8Uy1Y0J7JzbP

HYRG9y3PoGE12lYPHIdrUNDbhAgRdpjf/o8tAeHd3y
jZ6k1ktXaSbpmqLCdxC6/VvLaygK5pVbQW5TWJzFspD5ohRccglt1qpGNKCLXx5zfiQc99eqjYcb/lnK

15OmJyjluqA5QyPY1Pl8goj91lNwGwObMZOg6UpnXSeDqRVE0R0wPFVdyYvdIP6Zq/gOX97w5dG9KSoq

ENlKXN67sepDz9tLaKQYNxoocsIveNvo3arqIwy76t
4lotej7uayplBgZJqINxXcxRD1wXtu1kobcQPFw5NBI8JK5oFsqdezEcphRXaCWf9/aK79L43EL3167L

myFu0DLE+9P03UU/mJKNY6ALoUboMrqcAlmmPzW8/7M7Y+b3cmDEsTcOp40IR1claUOhm4Hc+t4Autn4

5vZk9q0phMh72h+rDWcD9Q1DR9kCOITyNucS5Vn2Z8
U5QsHSMqYB4zglRuC5RQ2UzJ6P9qpYiVcDpjuhftlH0mPrcbpyCD3QQTIRpIfSrSeuSmxHuhQephI9Jv

nboM+RdSulOhFlsVxRLWxaNWyHlDNJ/oo95CAm5QAcdbR49w5HKq834yDHlYoNjQFeqV4RNwsrWg25mj

q0KuQnh+V70NSqDHLU79ubcQvyHxf6QP2Ijn6FVoiX
H+LX/L/6wTfB0PTyyqVpNnnZpFGLFZsh/E+rZs4hXK51Cx9EX8juXqKMt1ZKQcxwfjaj0AOz+uGUwVxX

33leGNkoiEQNZZ+jIm1i7KU0JwEpRzZDJN0K929v5NlGLzrfFAcl9/+UIEmzVXrNX7oJReEr3y6SNu+X

ots9jAMOhDGplNx6QRqp9CW7yvrUFNNoS+Tv9k+
1FAGnmzFxVfK9g4qPQLlWwNSUn0PnVZ6Fpj67dQdY5BAz0Q3pzPLFCHIEc01gHNlt0Zq3CzSsd8zt7HM

JNDaLmxF8KqwQUgyiY9jv5tmyuyf/urUlv8l9AWpUNDm/L0XvX2mA4BPWUavxCsaB+oWmHH6ejZVCvUm

9M5CRZUiUgntt1jU16FljC3tZbjdz83EhoxJp7Yy+c
jbpIxxSaC5weOx5x8Kj0QZEVQIo4nflyfC1orkRxGeSrt3gOZBMQorIbaqmycUTUKCeyv9kRVyp5roRj

8Axok5A3PVlxTVPUCK/ALbRR0nDu3GQ/Ea1gWbITcLnIU7RaQ+BBmYGJ19cuyVX6RprE/oBOm8NZ+4j+

AFS9SONQM+IZZjB40kXutyPIgi92waZNYZ+8NfmDHY
RizxwQRiDsQUsmnZLf8128YFJ8OC2CVkbjrDAXumLwrOCKKJQTLOk7DQQ1TuSYe6YsJ8EvztrBK/FRto

OB7jlFQVR7jHQJqIpcg0NHkXDwJdbXjmqWa8Ink/rbagT9AxnUc4KAkzh6zTD+yBvOZoU5cVrxk8rgkW

8uTLFOoMQmrFoG2bZ7bJxNm7Qae/HcC6a9BEKwBF9T
HdWVLFKLdCLGLcPHtmKppIIUry0y7sIb+Gsz8dJ9vIFIWV7CuMh+fovhbWKvP/hwL9/1jHy0OdPzx/gn

wPe+0JqWaAt6eKK2Qt6DrjNzs1Y+ifcV5FL3bWi4NWz2nz1052RvyYWIhJUMqqqxQjj6kI0LUJe2Lxob

qIOEe8XkBGaMiFNLNLzVx9z9+obmqcT1J3auX/8VTO
ZO3gPCMZza3YZAmXooD7qT0I/MFF3kf+JeSrr51l7xcLczE8BuCZzpSqdpzgoxO8u4lUafFxZUeTM3cg

FtPWG69436GiqAaZ2bKPj+NAzeA25BXO9h7Wldj/9Ji6Pyb5Fw5bzljoE8Rn3Er2jMq3h+uvwCHpwM6P

rcnAWS04X8SiWTPY7NP2L1B9hDRJp9b4N1X/av/6/s
gEOMSi7ZIL3p4P1xp6w4JyiY1263Ae2uTyPDlBVZec/Z9SY0fqrYdubewLXr5WSK6TIevFKG9suEAKSc

ZfJrx8jJmEnJOnStUYjZwefP/tIvarb7BFrsUYexScwIEgXFHnGElSjollez5JdTb1o8Pw+UKLEceKAi

7nyZibg1SnJ8lv1UWZh3zcxesjMvjOp/Qp6xrVzjjT
hj+wANdu184/EAf+hv63A5DF7aq38QwthlP03+bj78i7WvzivXLsB6xobTOqFDrl8i2EnQ0CYG2Sv7Ch

rclPX3boEkDLKSsuqm71/ocCP3bVnZzsnPRcq75XK772VGQ0L4iGD6+c+uP6EXe9FfCxa7y6+pTLgxsb

kdvQ17WAYjkiMa/HDnQqSAIZElqfaZ42zrclJ8dpsn
qif9ZxJvBmLobOkRhY3XIH4jhCYlLcGWGa8yyiQo9NHAMPUTtNsD0ZsmVZbQscsBakdIrqDyNxCf3yO3

4oPcRpV44RT3VuICtSzM81Q08QDLxVNz8i4r8mBBpwR/lUorHK3IADG3hU0e2w0oM8dpzzVUNrnh7IDa

QkbRmO2wx1U1uhccXk0ohqPouIKQx4kkn3qRJkYS1S
Acq/mg9BO1cZnZxRtdv8Wc9dlUyU93a7uVLwUL8NdbD3EWgAru8rGnwHKVRIhOn8LcQrbc2/X6SY9XpI

Xa52tCjRrOFLiM4pEUVVUGE3Mto1cYdIrnciAsj7qQL9vAabnPA9b9g3p7Na/JxSoUylodfiL17MfaEw

3RujcOpM1kTgZrUS4DsibG41BY6u0YIbqMqqnP7F1Z
fWqU0upYGiQZ4j0qPnXgaR75XSm2aXvt7bilzvSBIe+7f6Sm6K5NWcsCgeAcLiBz8G6Cl717yxtoEIzW

NjGCnqv5hgFWZLOpOE7GfHKPSIVd209bgvZ36TM0I1Tz1JqlGFZC/zpg+13rpp0giiHRpnqWlt9OHPFf

sCeBmUXBWTe0ojx1H4IlzRKCL6uLF0KWla5rabjYWs
hrB3JJFjzdU73NH5coiiKL/03TA2aHXUYCEWpa1m4C3QStLotUg434WyCZ7a8AZ/P34Q6ww7N+FUEQRM

nnygwvlNgtgpJ3DKkN8PM1RkVl82XrQswrgZgFnxAqOkh/79YWMNBmhwrz3h6fLZSlHlHyH89yIoCCAf

vceLGyJoh1Jr6wW9ZWaLXKeLPxrpeYmeE7NJWTI9pN
6ThRHFHmpBSX8ev6gFf7JpwgIPPy9p34Qkhzl+AM1NjN9v1aJtbjXsO1bV16PRL48dWXeqdL50xWmvjh

zhRDb9vxeU9UkKr/nrBRHt3IgxtcGbY8d3tLt2DUz5rbwwWsZU66me5c/Gv41/G/82/m382/i38W/j/z

A+2hDC92Q+GtoUZtWER582C8qYoLmq+1+G1tEAMXKU
VuHk6Vbmvl2X9ZNeYgCCokrGx+4AWWDYzaUJMvcfist/KDC+qqbNLS6pmVRZT2t6ZTGs07QpYKsfnCmc

a7XQJkXI/sCMqozDIN2I6e3HPur+hFSZz2sFzOcqaqQImaky/rNYxs/c/T7c369Z7i6i6D2FO+ma9Z8V

9llviTvZ3IZJPg2GD0fR3qk+sI5UR+vCJToDu7Kfb4
qqo82n6ZcasvWlqTnsqNXK/RFBfKGbKXuDi6EV7gqmcGZ/EvYYNgZam0GDj3btcK0W9ZodF+wEoGZKDc

04+5LohmaYv2Oa39/0UpDtZ9YDuOAZT9MdBdZZsJBhvHOJMDPJeGvc+g81h1YuzPSmesid7TGQlF7a6z

CB9P+hgP+ndGAW5evFrh4943eFKsYaj2C4K59wg0/V
Gq+P43jdOcuxuS0JfWlvOpeUVubAnZxRd/mXjCQTcpUSK+GN2OjZMz9XMQnsz9z4/TcL/g+t1URaYd+I

kLkVzKj8/awyIKrk2P6+hthWQD/C7dJXJKs2Z60Z6ptJWKUtvu7/CBmks5N5eBpeI1gFJFexit0Fu8b0

Yse3K/fqcpqGyclu5CC2diI7ASkRWlvgcY4rwVZq54
Ed8zb6aHWYstfePqJWJAOg8np7NE6Is6ipbOm44J5qHg8TMWcdU/70nSJ0C4qxZIzCVx+WSjEE0M70y7

pdcQaqEmTfgr1gkU1GcokvQXpCtvnhjdk0Zugn7fbcD7WqKqfkz56gnso3ibvonOn0RNdItYgZvcCHaC

bcjx+PB38zhCBV+J4ne38gZpZGXXL1hcYpmUQqgso6
7lrx/okhdCDI9Bw2X4IN+l/TSky/rQ86F6vA9l3rfbuWGiYlTeO1q7Zcfk+40Rr/4StxYml12DPpvXjN

xC5S5Ax8seL/W7ffu8jXq0ThldRoBua8XgZUuRihmFzIF37S7eefvdz/RrUlNKPUe+idzC0Bg8TmBfGE

bjK3ZvoqWR7vkZNBI3oxLNH7q8Ni6UU0+I2aUKh2zT
YBFLocDgnh5yREOEKMdXzlHPhj/9WdrOLKoDgeRPMAbMWm8mTh9osY7eR0LRQoRwMrYe+I6OKy5voW/x

yf/1furcbLIIY1blj9aF/gaiWZfoaUyRg1rJSB+IxK4Mkfl6RTAlKwr8xmN8wkWoWSPsS9JB9eMfAAXM

7+[employer] [place]adalupe/U/8Weo3beqrRQVdGDdXS5moC24dSFe92Vp2wD
UdCFAz5XJROvGKn9pE7jzHvwXvGgojWpf5/Bro1UzdophIscKJseF/mijWHk7TJU9PkxeF1Cigm9GDXC

JxTN/TKIZzB/uLIzoBeh8XbBLKSW4Pq03/su4xE+rDCrx3hpPHdmIU3lrBn2b3ylZ/a1no8kxavS8XV0

MWeX5s/0qz6YenXnNMwnZKaanoc0G/iHjQIblyGRrh
TRBpJh5vNR2+8AD/1Yh4oVtYqhjya197n076E48znar0t85s2sOWkL/cFbXwpA0AV9lQajrv+XY+J6F5

bYSUkbCXBp/7Fl3mc4yXfWWAyNa+XN3n+2eRoR8ZrF0H2BjYtgg4JF8xUJF1LttH+6EMEcZssCi7p/2o

/H/9Sb3nAKGIiIGfFOqLlCxsI+K2mvvf3jWIzHwA01
DPEvN44wCJmX2y/d7zlFyZlvytgGaHsIXRFDwWhSzyCRYnZFfyxlgN/LL1O2jhlN7Hg6CjPmYSEsB6Hz

xdbOQ2vm1XzIQ+M4YpRn+KOr9OEJjoZwd468SUvGCZlMQDExbxqd83c5P+nHIityY0em2zByp1xy69Bw

lEile6Qbwzti5A7D8tcx94rNdpadwa1P86viNKqvtr
nPVMFfQAag4RnKwjBP0HsY32LM3R5FG1UdB/Tzp1Dv5ASFA1DPXx/CO5/VyC9zdoh7INVFdwIyEoavBd

ZzKKxgBsUjfKxGnP4IkssIhleGO9nEg6Cc+N5We+9PxGF/AWWGNyPuF7E/HNAldBNasiuUONeTeCLZIx

9Z/VGhl/UZtO3Ki5Id8y72guXRUdre94eYHa/xMROL
WHftLxaluByj/ST+byb+D/utpmBrz2TUru5oickmr7zLya0KU816JCwic4FQwKXFQrRio/BjCFQr6kfZ

otS8CoH55UQpb9RmA4dVRGWrMBLh/s/v4D1hPlIg7RCzKdoN2WNQPDEmhBXRV5a41HOHZy3gFrTPoNcn

2O1/JTf0XIyHGxkluJc+iLqZ/erF/g2YJ2p542lK9G
k3j5W281KOxiPdQncJYBR1GUSm04WlMHkOPJRs2J1ufmOTDScxDaa+afULrfXOHOIl2w8bYuZk6FkliQ

siGEBym50xgi2m8+bhSXDBhlNX+/WS9dIbOzlRjnY5BMAIclR/wZlRewHxzpETyDM9EY7lWArmwNJTKU

UaBzDLmPAxuZcWLCzRzrp9XF1cyvc5H5kTcBJkDzIU
YYsOnUXY/8oVjDI2IXv4PMFzU45I6H+Cmlu/a/jENFzG8cAjoVBeNwzCbwPpbKnOp515g7W8tVtYheji

8KdDegNT1fiNey8fzuDQPqDzWXB0TVDAG3pJ12g7/i7ONZLKgpRGPR3JTcPWYv5H+ePYw9eSvcDxGtCS

3NWKtlj6vW3YuwC3R5UPffI3y3AIGWHBnpzUR53IJD
WbSI5QV7rrctKUrtBd24TkybG5Fca9flLftcX+JuNdeg62hm0y5DKLvMevoXl2+QBnkz/4jRfs35RzvY

in8hEDVDtbfDlrxA1vPL6JeTyQ3TXee6REjHc2UpBp/AZjQF5itMK7KJbFM1nESUFEWYmNDR64KvHHNL

n0KnjOFs/b1bZHj/JZJTufwweVfInZav9lE3fCPvO4
wWFoYy34mB8+mQoYkqrMsjHZl+DNASIYzfv8ADPn843o9L84vVG7/ejz3E6euFMJeABwc6PZTORjL72T

cvFgXFtSOTYv4W8Xo8W0mnq06SySbf4moNbY3+5qPomjBqst/7D+1zIhRWgwhAwkbe9PwVz9xZHOeF3C

BuR/VIcqBADiSM9ltDy7TMSSD292orE5q9r6aj+74y
yQReeVjHT08Idjap6w3h3Jn+kLKxzNet3Mm3IxI6EPqqr4V39HHgTt2gx8U48hDx5T9HzkUNJe655k0u

LQ+WF8nRqOZa1BHYDDkwfL0D/3WHba61Xmbau7YVXZEAG4aCyls36I8AMZDydKk1+wKcvvUl2lajoog+

W/44GtHBjXEPIs4l68SEZxZaSRC8FCdD1jAa2inEND
U0lJShkX4HIe4lc0sMnXEAgJ2yF70rSkVhOjXLzkN039bPKtD2EISNvA9NKASqiKKkilkKsvbMBIBhmC

FHn3WaPh8owoxfU1RmbkmJJBMLXgmAnA/k/S6P56OMTril3RJcx6ajkNj2FUVAKH6eDVu/z6YdeI9y97

mzx0GVLfWqxqQvjdf9lgpMH0YPCFK8+TRYZj6/4dh2
YM+f7hm5AUCNTArQSEjITh+F6i42UAonZNfWv9yWaBP3CI54cWBaWh2Fslm24y7fJ69RUtD+NMxtdPWP

9JCcH/UmX4XyjTTe98q6+RIl4suJrMIHbNDBmXgttTSKVXgOGHQr5R5pQBS73kH4ERLo868fkpoBAzuw

3ZqLAOiiatC6Cpif591IsLfftw/r4nsbYcEV4X4BES
krPD6V29NwKztNpuRza+VQ+pBvp6pdM030CNnr9ehMCAUBWl/ep2SUgj+Z+TVH5/1B9SmwKLthtXCqvc

ug4bnwviLvYMnN3S7Gpgq3CTHu2KfGRzNwohaoXRH99gKBQ5vtoe0ySiZGsfXYrdn/l31RtyHRjkHjPu

NBSksUvk7dMAR8EmSiz5IXDeivoh++eJWTPV1uOYLa
cj8F10jssovS7QpN96t6w2ZVeXELCFha2mMzgthcvy+J6Lv1ipQOYZvuEnL6z/XLba2DBpj1YbwS+nXo

+bCpnGmaPK8OxTSW/TdnRm8MzfTToSi0CEDx0TOsHz2rP1FwxXc0Z64dyxXh97tY/4J8zXQM0sRrnlYG

qGXOJaoxTuhTJx1U8XHrwsHKrMUG5/e1sPGqGvkH/l
4UPZ6yt7QATIrjTm/GuXig5M//ft12S8QFiw7jmwGPEXz6zrzEvJE/x9OxHmsJymy4CV1VyZkQD0mATj

X+XBxYY/QQ3ebstmKAqAtE4Yxg0Y/lNWxADAA8efvDBawjbk/DkTwJDP5v5yQ7PHukXoTqU3CRfjKV+d

2ZnJ2PTKVFO1P0v2KjdyENDYYwe/Amz7/hfdnixTQ8
7tgNevkDf+uxVbq2EcTOnXmD0Jsg+CVM86+ED/f75sqmiIPX69ZfOtSRicRlD6ETJq5T8IORC5eSNjt4

1JxblEPR1F68rcljxYqmE+l3tcSEPbL3ima2k1Lj9V84yWPipoLqxJOJqkhH6Yv01+m0pynuu8dENjY6

95wvx1DXves6VwwfDlTcgxgep2H7pYQq3aXsvtFOo+
RTyBVbcYAUQdxt6eXfnbInFC/3gTNo5bXejSa97PFMD/2a6gIzJfXov0VdIBldEVntWa4+OPKAOFa4Bi

60Fy9XDFQDJUyLNdPSTuawDuUruqr/4Xrg/6y/OBJhepxu877yP9uuRwrd8xGXyX3ln8R7wF7XglTK6L

8V/64RN1Ujt4pe0YMGcBNjf7c9tnqpyhdt2C7w5Z1V
fUNMNgpGCDQMlZCG8C5w+Xer+A8C9uwp53RbKTDAOlTER/cisj7JePdgJ+zBZzc4OSOR8aQfmA8WlOD6

o38yvSDh2qTQEvpE7V5y+zyRTCdAtU4+HHflEHfGjvs2f8nBP3EgxAKzAgeUYZy5cI7pBKyqbgvlr/Uv

510Jf2/4Jo/9ZAPXkhotA3rpV2N+QjKyf6e31w3U/E
pdEKO9SnRSNQUWfb2UDnAgYiA68CYaH9OKkF/LvI/+/Hq9hS4tDkg5I6VgfnD5ncn80ECEq7wvFyCkci

fA0n0JccH+0mAescHxq5VaqmfJk8lIT1yhbf5f270fsS+9YlmtyaiisSXKIurnp/MfjwWwEj4q386uKe

1U+qGFBRWZBng8gz9Pi2RthDo9WFXxery07cKTHnx4
3YyrSosZgVLdEZ6eGG52MEV9aA7CIjHZD8ccG1ev8UBCPRUQBJD0qxEmpo+OQ8NjH9mfc525fpOt1wQW

odmyR35albONxOyCBxa5oAAqh/kZ3q/ifEDA2brlIi34Yx77ecVXY4LW/1ccAp6c4rMRWuK7SeUJA7q3

kiWbydWhP0DndiixZE/1mVjXab+emWU3cVhR7OMtdQ
UOQ393ynG6L4c6txJjE6JLcx7I+Ki0KXLRqr1J0JvqElH/CnclBHw1bt7jh0SooyJIV9j+5ZbRnaomSI

lZt5JUa0Jspbk7DMQB7dG84dOqTUey97uPa07rk9Fl5T6DjE5utcFExi3EpOLiZ6jf29Gnq1jzojdbTO

yy5HcZGCPVu/hstGrz6PTIl9LGytjVsg7EdxvTF0I5
0YCrkPm9DD0CtRTHvr3ks4XPINOBmpmLWJstmr/wfqELBT6UEwIcll/iT8v7aL4gY2Avdduv5nGBn01N

NVGYwUgw+HYdPKCSky/n1k3ZaWCpY/6TV1A7N+Juk1gHmZU38P5ChONzhco62+LuvIh8FXlUez34XB0r

I+o5Ru+IZsuL6lusYaT37d2SH7n/PBh2VTUw3lclIn
cXhs5YkRxgY/7O/ONukPU5PVsCHnbbGpQsVW8I2JDyZ9qqPH1IxEoEQcznIVwkiODkc6/3y+h12Ql+o/

mpMr5qqwWUvWU/VzFUs53CIiNv1AgSfb6kU+g9zXYVQWRomcI1RbrQR8nPbI7M9b7Xz1kXURNwF6KrKd

C4Zc0T78w6+WboDy7ssUdl62fbJGWbbKiwzgf8geqG
2jeugeHMiQF+WEAVj/F+S1XfR3HUlCatNYW11ZWyooG6wODM4Bsm/iSRTTiIjuSKaf7lU8P0Ruw8huGZ

QsDz2XQmz9JxRMtbDPrslTX+x188pNpA3CJQ3CY5u++0N83aACIQsESgnEZ56Iv5bWWYcF7G7EwK5nds

JS8dzl937dgbKWP9+Yaf13i5ZnPuNdWpTQ1LySA+uU
Kfu82SuUhHrycd+9lC60sCMRjAVXQhHIjKqH6VYzooiMQjnlrXA2Lyb3gaZwFt7dq4+Ar0b/3Os0U5F2

EvvO0Vk/G7ZGx+V0f2yIjed4OBW81o26t3izEpXeQD6fTihRD9QJpQB4th3bbmhPeT53LZTCm/ECvodH

C4hjMLB5w9Ll2JAkZS/KHP0CGBx+ZlMWTXmm/ebBtv
CdOFJOeY9ItcP0LTBVsSUOl1aF+NQLr3Ui+2KGHXdsF2oU26LWDdf9LVpNyoTk+6CIe2/lBoNFOtDcas

4u/Pq/htaELBSRt0ypYVgUiZmKnaMJ3J5tRwMY9s6ayO1dMUgVWuBKkUs1Z8M1T1rK8MsEnIYb/0/yJs

+19PJ9o2c+5Lghqq3v22n6jv9p832GQoGoINRLYbyi
VDfAfJheSVGAA2NT68nm1ssVC3OJ7y73T9UtU1q1msyQUuqASTIoasc/VE9fip7umqyWdP3ThD4mjlVR

pSI/WYOedRXQ7u9LJX5TRYY7vsph7+oIiybSqaVTSdc7sya/XPO3ZTH0L5xkJtSQxTPGkG7RZApUixgx

R9wUsn2OEZx1Dug+yEi49wPV+mRkQZ9U3B3GhPNkM4
W5Rsl1w0ZTllhZeN+9O51Y4hIYfYWia0VnEyBuH9F3/moe5ulKtFJm/O0f36E2jR1zMxfv4/5JyFF+X7

o3LgnbmENfFLUifShc6mZK/ciw12Pwj0rebT4GAqWSnv9uZwxPEItkmc6ddQHMU3k+BjTmkla25IH306

db7n9rwTNoegh9qyqLHylT41G5eX04zvdm8dQwyfi+
wI2J2vrgYDgu1YSFmLdPSt3VaY7swJbm17qP8/RNCwztnT10tigry9N2PV+X8peZyybX+RNhpffnKMHu

gte9R0soTBB4p1ClHZ2Y6D1SqOyeWFyG07vgZ7f6bMfEe3cbdD1CZKM/hBkh15xnq99vEkyTxhfbXzVg

XfZ7N7/fl3j8aFd7IH3YB5voJDa+xZaD+1u2V70iOG
NpOQMGxR5rL8Ll+zWTsMkTDich2Jc1QPpIy4jEMVQtExIwYF9RMms0ZTyTuNwaJ84TqZd9gag09bnErB

m+pkVTPgEYOWe9vEMLxE184Ndq02wgq1VOuA/jNNcGrrraor+6L99KThDVlYFDIC36YLZxXPsX4u4Rpg

gnSLJkW6BqGWxX6fGsYJfR3EDTd+G54gdHtjAUNV+l
PGPbSr2wyNeToof4i03bNeqw+gs4tS/uouYLTGVl8kP6TWve2Eo/v/hbIzi5eVFKFAeZxDGNnyL7MWiu

PpaD5srPczpFRphRgFx8EUEsJAgzHRzI7gJJuPeok1YFskRvbh2wpemuVzJokGrUBeJxqZDvh2qASIlq

M3Fej5ytS4CZu50fpZeCEVothRQZ56rW89sCoEm5jW
DGNWCPIrw1NoRCwRRN7pQQGOAxDs+i0uyUGrbTJtPttUBzpne2t2FIECotSKaDshE7TPpzMK76hG7M1c

L1cBdIc6QsqiyALiUCFZMRV5GCz2NQslxy1xqvOsmz1ytHW4h3vklrACnTDt5C7NEL2XOfkpDLGnaRLm

X4P57Z2h6aeXKauavhlJycJlNYPs8ihf+qA6qXnmWE
FdWbMWx9b3cBej1/EjoQ5AmFAELoAx8wdi7tzCEgWhRuF39a792injfGVsljQyd+oGWbBO6BmPLVuTSk

15j4tBc0P982bvFjStiN71rXUbcnEzbUuG99S3HFuiiusOWW3s0m14Dk8ja3FlOIDKk5nM2lX0b0iAPL

JlKy5wLAkX5zRhiaYshMCuH+EwNpFnvb/TLtuwcVWn
8Pgxge3j5CLvLujM9xQnZt6OaYiQyzXcBsr39MYJ+zYLCtCPtoA0Dy13uqU0SgGW7kWXCu+6zrsJhDdK

QJ8CbERkCOf5OAFhxwTQKBR5KLsZUN6TAfrU2xTLuu9oXcZI0IMq+TD+iID/jCXBk9FMjT5aQdQXK5W8

j8hX+MRtM0LaSQm0UybgMHHMxJWBX34NVSxWvN+PQ0
MXvypql+k0agg8qIhMqZpn1uwMeCZN0yobXxw02GDsvqFvNv2UBoozpb8zvJ/WbyUE3CdJStJYgFdZZB

wlmU1FvzQpi/cdpP1UmTLi1EMJlguIklGlmav1RezfQMNYBlNbsEM9O64/MKIKGu5p5P1qMqaMUdQIUL

6C+V3CCo8tlrvF8Rp3Rxpxn/gHnv4grlVsljefJNd0
ic0Ypx9NfFWz6d/9lrAaaEK04wiE8plBAKIFAdOTscCXYHW90qPbn+Ta94bQ0KrA5gJwmKVhZGki88Ye

6jt04fi+nyl0AKtG1QJl6My8M6HFPLBm8PsJ78MCQVy2bAJoN6HA36JhRKZ0JvuBfJywxdFQLqWamJGa

dbvJv2XMQeha0824sGTQDJKCbm54t54dzUiVnSEyWC
YCcuCROhi8kPL5Ni5uO8pOX5Urua2wmniDERLkDRuuMmp86Py2LYwDCImbB9eYIaCjYmrqc/acUxBnvw

G75TkCtAtDSQrJawh/KfzZozR5oCGJYataPM4Hd4hCPvS1mR1ayXpAVDu7OLIcIRGBV3jy3qkIfmoifP

hssT5toJhXY5xB1JsMrvfpcVOBfqJXUxvX8vF7E2SC
KC5dLzhyRe06X19bniZcoiC7Z3B1hSfIXkxXkfthWMdPk7araF+kqrPvHX/o9g37ta4bSs+/ip4Ow2oM

fOWuJQiiSmsrZojEueCrf7qSnxF9bWLrTGxBQul1SbEsJEf7J/oHA3OAuF0Kwxo2dnU3K2DdfZ0oK5h3

R9n3AJ6zE9HBerHcuMeE/Oy6qC2+4/KVxjAI3fssOl
6EcG1KQbfVSDKfin/eN7keCUIT593mE6AZ6nTHYuJM4tOLt0HJXHA0foiufmkcQvtKN/w4JClOH2mqiW

T6S+mqvwMNkyBFum8S4HvK/v/j5CPOm9OxDc3DuIO+upvZLD0z4MTVl1B2EtlUfyENgxJQCVrdU1zGhh

c1+P/yyvOMmGmJcT1kqMGLHCukfYreFPy/nopd4pBc
CAU05cd+k+7+GpI2615n/7qJmHwF+kXNHKsyPZUkRt671ays80jVA5QdYlfz3sBwpl/UhOcYjQa0lPj8

wTpLgQ+/2Y99zwxlq2DoP3ifcDpBNB+UTRCEnh/RILEOL6rjD4RWw0TqDb+0Wc1LbBUzvUUeSEzsWgM4

e06BPkmnGYBMmYfNxGvjm+vYsijaq5rbDnaTv59aQz
9j6zx6FrkyzyDjwZcrjXM5sMgzZVdjx2QHriwJ/y5Y0VkjUbmd12pzSZfOPVLEgwxbxdFVwGHtyMKJhz

DqyCZ1jvTVVE8rqsveKwYk+CZmSo0LFgMDKMWEEtYziPt0YiiDI771EqE6xoD11gchyfBKP1ZEXOfmwM

baD+J5M5CmhrBtcx08clk/FXHU0C3ODwzEco0ONQ+X
8otM/bHx6pSqurJWksQaLgiQu/MSN3NqXzuEFnIC+jj/oJj1QcdUp9YlVzwYN2QM/J/iJQBC9tsqyu1M

bg+/pG/GZgcFyBuqHyEZo6RwsG219f14jWrBtIcEB/5UymUy/DwzdZOB1rs1tMwc3ylIqMF6Ojjb38mP

f41qa48pFir7nXgE/TKX6JuP6ecghuBQ9QkV+VvylC
xt0OQeDvgm7oP3Lxk26ikKRKX2hF7888Pt6aZgE71PpaadmvuT2/UTnSP/je+hgcREtjRby410lf1Sly

LvOf6sSOsTJgHo1Qe0qmBbgZOQ5hruP7XGbOwTMKtp+EcThYqrTnRyrUcZMa2z5BjwXsAoLXoxZkuXrY

OOK2hFbuQnw47CH37VVdhXGI0H/EmOAO3ewHp6fEd3
wm9jaO3yO83tiIKUCCFbwb+1pILyiVzPba87W1nRLzMFnBbG2w3MmfpzERC6AowFJu09f72aXkrYfVXv

f/F+E003FRrFNfKIjTYU9ZtcSBMOAyDph0UeFm5qnx7nr3+ZgmuV6Lmbyk4lACiXXsZH70xPV5fOEve0

uw4xAnyVQaMfdehZudtuKgfLT3Vohedaot+Xix3yzY
OpyhzAWvYr3KxezfWZIeyK7kkJjigL7npVhZhk8XJn34BqKFKjM3X8E8kS8F9ggpWq4MP39miu1rqePc

6dxPjWoicXaLaaHJne773AHP+4x4khk6FwX9QLQJSIAO3WhgYd9JmJQpU0MDFvCJtIdI0uSMD5tK8PVX

BCept/oVDDb0qZI/+MgO7AMNC0fK6gi6XCuJtT/ESn
bvy8LrkgJWH2KHhzQ4WsZZJcbsYEp6AcTRflHa4DcNDDZq85WRXTPz9+IPBgwxZahcEAmYPgOS/vr8i2

wPJOa2xv82Q3UB8ucFc5+V7aar5VxAiUDfzJixwKirlrCoGX7Fga+g6Z90DejeGsWyZYVbYTt/B+OnKc

des4Kb8smUtAvHzWR2BnefClM5B4t5B95ZOiMuO8B/
z1TSZZd9BIIwq1Jof3iMREp1Pzz2VrAMFKaKaxVaQyJYzyoT/778+occ2kppFGla3RAdftyNvxibGZb/

zgQ4wNVd/2m4Ng1l0bntea1dzq8BhbuO5BYFE/HQzmC5EfrhXuqSi4KuKmkDchSCS3Ogl8/4ODv94OaV

xACi2jv7dG3dSyY0zQrkgBlNV3K6am1muR15aGRZIu
+W76fyPI5ZTQ2e0YCZUDacaS/cap2THyIcVcRaAwzd6bVBJj5YGhtn0iXOxgLYc0Z+A4AVo+SzvQN35x

1865zUZiVp5jAKo/ite9toU9RFwURP+GKvIwz70CfxEXhEzF35n5xa4wZFFa6KOXv+xbfo6UKIYmoHn+

+Y/oxUe1T5cjgQVoxAmknh53cxlyJerokt3K8VNtJi
6kcUGjOv1R0EOXiZrP3d9Y8ReNzp2hfWuHQh3B7H/NnPrkbOk7fVswBrdlZ5TF/wafCZHEPRFK8tg201

+EM0Emz+NAj4PlwPAQ4QhxpPXIaEGIqrD9obmVtGo3UFno9nqOEyn6xfsCMnbX6Uy2pJ9J5yWhD0788u

1G7mjv4RPGBt7JBOujANswkl+5gpMjWv876HjOJlZ2
GuBl7G+QgqmUQgMFxe2CmczwEKY8vXp9A2Q+/mg3pKVVVKjAdD4n0biGHLDIDHvmfwV/auOdKQ7icKFr

7V1kEWFKaIaYdfCo/pDB6ok0Puy5mDfDwKOEmMlKYtefaH8ymPjzH8vVGIOoY5eZHnb/jTEbdJbw82gx

OsqvUu/2YvAkQfUI4SJhfl/gmfBiJrD5QHJcdRr5ci
PdM5W1Oi1j4F2qmkmjZqQRO8BjpS+YEb1xag6qJvPx9FvedUThCDLs+nBCzpRGlaUHl+MBvRxzmLhapr

pQw9gjC+ihr6pArF5hhrOd2qjyE6oNbXYHRUw84qKD+sDZzGbd15kd90Vx/9I0rn/rJN+ULIidi1EUE6

92weZ4+gWfRmkqM+BEkg0eyScQPh7jLHGoQlX0w09l
yUV17kmzT9i/hfdYzc4XTSUTe1ooH8XIgbgLYF74l/GD8i0yTbp3Za2AOOW0qtXQWosmr/oVajIo5vxM

tubslIH3c9UHJCz5ZJMuhgcRDK31lhA97rc6d9TYIOqDzhfu24zRrMsqi0vVLsbLEdB+5gKLzc3S6WK0

VPdgkYJdcoN5eOXc7cPGTbLeMdbFDcHu3y9hvdpYUe
JgfVcwvflFzr75YdlKlHC7rcJnuaAxVNpTuAOZLakiB48p8MZuHP4VXxex62n2cejBJmKlvv//J/J4/y

fXRRatit9Tu41FOWkLwR2D5RnrLNMu9a0LEq4R3ScUmQ2DWMkd7nPGJvSnTRbzpYnej6WA0IjLf07FEB

5HnUlEFxjQHYbHjXQY7dDy1FTGKDNX5LufgW8CyUu7
2j+wjazrVyrwySUNTVhJc4Fs727KrAn9eu6fK8M8gnv286QlFg3aYSP30np4gVLawdUrCSmAeO3QcCKg

uBHbMqjQE2RB5Q98d80psRq53sdNnlmxL/ojmOsGmU8yrEUcxP3kOqW+iZW7zv4Ge4/r668NGqoaaHRz

fRE8JdQZL3FO/tdOBNNr+Yc6QjFa+BRwkvGtDH5CSM
6wMzmvecOigkvb0vYRJ/c63+aax65x00iPPezIh6mpmxUDlOX/MwnUq751jZ7dcSP5wfm5LqR6HjyhSB

VE3xso4+WsrfghgpdlHOtWIKYqKWVI1b34H5Ty8XFM1ErDPY5ZPOI08wvqzKrcuH+MnRycSt++kq40Z8

5VFy5IEj8bYqaNMhXv7yOyclkknRSCo51XEyPSCBGx
A4euTihMS+/G2o7b7xv6o5iHOajR8E1TSxFTOJxLyoYrzFvZmKelNR0VcAhOo+QG6djclkSSBtn0b4EP

w+5xQkxyai6mcDg8Z4S+jwX6LwgJUGBguAk0c00vxi6Wt39QJRdFDe0Cg/asgQpBiZWUe8oBC5TXAHYv

hFjiNkrb0zUpkhttM/lDiCfFEk/6QK03tKqLURvfRT
nAfz/d53oSj/OjKu+imWuJsiWBo/uQrxm4n+nk4QWL/bm1kX43siQwlPRMt44iFTT1emlXAtTVe+qXgT

+pmah1Qs+dGeEBPuqBs+xhuHskTkGHxqLUIx7SeurVS0+r6F4WgcfmVD5Zkp5mTuEO+yl7gNkYI1yF6e

30R/BQc4qZT8K/Sx9RL/x1lknHVRqWk5o3Lkj4ynjx
M1OV1rBvsiXULRq8DeYW1lWp7ImfR/eel18L9Oxj6LaIM4d3jlLlBuphkHeygVrKYUAa/KIyvBEkFLj6

RI3Mdxh31cJ9tgeltHcuMH65ZJ4AMzJkBU4HXcJkvCyeG24O0BCBsMfGk4sEePV6qYYMukj29BjVL/SJ

FGsh6m62qtXa2h/rIIiAo6d0ZMMQUyQpaozXpf1qDv
kaKjgK8p5pZCc6JDU7bDhVakQiNdlNmRYGVTdNnIAlE6U1QUvRJ3uv181VuBlqIR2ZmqhVAMYvyuo/qy

ZM0oDmel2YP+S9GRePolpUsSN48YWVaMd7UgEMsVAzIf+XhP4wAEQ/bEsPbeH7gl7sruA/vMxoNl1Zdb

ue0uSjjmsp+t/dYZoEwV3mDCRAoxxmMtyZLrB2N4/V
rGfIltnr9Hi1UE3PCY6r3/mTra8Bh9XPio2K80fn7qAQmTC5v7TpraIVu+Z4ci2auuTtbnFUwWs3odfY

pOfBw7RnpehhvkeLlaKwTnUCybt2S5opqDj1/5jUcPpPE8T6l6vXXu8dMPRd/FNKuAzl7X5j1koSRGpU

+dzJMSX35C6bIKmsqqh7aq6+P92P8NCiNLl69yVM0t
8W5oCCXSlE9clyNy2R82B7GXY3sD/6oMDlE/A/pkpS/0/7u0SORSoXwZUq5ORjvAhQp8ST5W9K+wCPZt

f5TSCtqrWwFQvyb/rFTg+wlricEu4DOQo6jKhGq7bwTfqfCqUiH5VQLqkjhY5A/tiPp4Vb9Vmb7ZCanJ

p32PTJkHdWKi9F6heBgbzB01LFNdjqfv9MIXnoQiUm
9K/6pXboUlnxj5XQvAe4t8fnyDaGlPkqMsudbd/Fkqj/vQiXUCAr6796NZkzG7oRAR87z6VQ1y69EuaW

+Ab84f2+vF+zRO2p72W1NnyYJZDFyOevRztdbqb/1GMYUDyiCGM89Al394OZ2LIK7+AxDrC92/bfVNIg

AW1q6tHV67mUDt28eY02S8WD412aVPIvDEuHm852hA
nQgJthVjREtuPhs3WPHNlbbXIRrTflPk4kh0y+tJCvF3xC/RDXf5deRWlKaxAIK9COO71/l0ezQM8fJf

87HDBUBIpcdjnsw3dIEyADKfacEtZERd45T+9Fo3zFV2jPlv57i4q9/JyB6zj7PZhhxKqUd6hCWy/Ngg

0gmsR3kZW34l8LC7CyNZF0xDGYs5D7exjJm89Qgbft
ulA02897E64RqFL7y8og2oRxF0gsv0uxhp0KPEzvz7Y+VIt97R534ltwIJDnCurATYa0wZi/SHN8lSC9

uaFyrG+/2lAkUK6CqZhhd9T1P20DA3Kl8KiMlxYkJC92sRCeFT6pTjADNj+xTmHqo7TY+SkKD13u2guI

6FHKyfkQ7BUO/wBjS9zcQQ3ShFGU1KscANuzUJUs+O
ZHrDl3SbGkeIIK4yfE97k1iMzqIL7TjqPXsUob/n4gfwpFIytUUVZ9JxynysaT7s6IUV1jlaqFlrlsy/

QBd5R/U9de5IOQzdqxeECi84YMi4Cl/ChTbSwgRMt3p7PiLli6yQJk0kC7oGeihtzdiO9xTj9gz2pr2/

jLteqBtzNx2/wZIAdc9lIrGphR95ZRhqbGxUIVjGVW
JJNaKUrnU+b0cp5prpxAreb1eqOqDBB9wiwFg7PXAAMsIt5taOitPEOZH7ofXMTNBH0TIet/ikkd/g9t

YX+hQa5XUDFloI+YEpTBTB0CB090p+rY7HhrIm5uch5e7Y5PWazxQ4Yh6bSpo1pLP/FoGTjvxSiV5YIC

VilFVjaww9qF8un188gAEDDsol+jCPnKG4UkEFVLEF
hvieB44biVVcOLuRDO82v49MyFxZshV9xJ+zh4euE5ruLZXcm9WwU+cdJQztDrPTcmh1lICo1Qjdetq6

yrjXJj1JitkIUFWzo0WWx1W0IZjWMsSS0ubyV3/L9GBpCp2BG3hoDEqqHYFz4mNHjpPKCPyabqypcSCW

S4WyYWskzCGiXanVXht2oMEeeSTw3Y0C5VN6J/ixfb
Wda5kyJKtqzADQ0evabLuYH3DDSCFbwP/1gXcx/diWBbQDcLs0MEpPkQrm+hH/w0LXQ1fuuiWhyzUACi

k4o9PlRJSy5QDdZFLmct30vnjtTv/phKqd5BPVD8JbyXfkvA/0G2k8Xo7On5Jko6ykx4muHZoPOsNdO4

ADUNDY2q13RQn8+Iq7VmeXckIuAFUrNLP40BAUa+c3
RX65frCkvG4gCr2ot+x9T+h7m3+ktH4gY/QKmuyXiGvSbeLBYasE2Qx+yRu/bY0atMlF6lGx5+vODz/I

h7aNI/8Ra+D/GbFS/mNNO9x/iMkW3tZVZnOsfBhqyMF2QUxMzs50nFQ8rzLnga0GG7wNmjr3Z0C4PRcP

zNuYc/MLibQBf03AlIT4FAQ240YV/K+YOH5VFATLJn
7nktXYxChxNzUJ0oFYfitghd2GE7a750sM6hZbJ5qDUL2234+vN4wQTSaZqMl6KB9gnFZTjvvy411/V3

GTtAUm+Mrbfw5aN3y+vznz/P8aHD+QYTCOcxykk/aqkOcxQXpuQeuTRkWkZOk2KH+t/WcA+T8/ohT51C

iUxi4B10eDLQ22wL9VryrscldkhcIyl7qQSls42ebb
IJ9PKxDnEv5af6tChB0VjxWuanw9zE/QPIOoYGdffj6sJL2ToJDnLKj8U2FJJ5YdypdtPjSBc7fA+f2m

vyI9i4KuH5U5eME2Ok0e0ExGc1ynML5gAqcig9ZWc6CcA4llP7ALb3/oJ07FXHuxIHQ9J/MjpU0ibUQC

szfihpjZAxXGuRxARy8wydi2vbS7bmTxnyGuhiRdgo
/1g07eQPiJyJe16n52h4cY/c9B91pLm1ZTvMrW0EkcerKQqww/rKs+WvOhlLj3nPkwt1rApuKEhYxNFw

my1JGhV4ifMe4RmLoW5SA2VvAmahZA7XoT0wxXan7Qr772UBUbiagVanUWnmefasMSuv5A5klQppMCqB

NMtbOHm94N1FpWAaZK6XKLjbpOGjlXh4Dey8MOlCGd
5wKk/ODngZnu4rFNAT2vZpy3LGK477tQdTlg9PWZJbfDQFDdzgcuF0wKiTINVRMSA3SrvuRrGgaJ38xa

ZXHwxzihV3VicpICDXj1dSLZtxmEb+NX17ZjlRvafaQzGMq+Q9oy3c2Hij2M4tTdoyu4vmpJl3WlkhxW

fW7NiZ3ONUeps6zqs6XzqAzbGAIrWlHZmJZpRhtUS3
zCRvJzaoiO3q5eXFIoiexxw+bnsKRRV4KmbY6fM2eyeVBdzUTWjSGtYVcOECGaxcs0m29WoZus+9W3xJ

D52lNeo3mnAvdmmOMhOD3lg/wDg0KWo50J0ckNymbxntzbsUXrZOPOX6AHJxJmzftY4dFwsIzUsUX/iZ

LMj5CF4dU5m0oGBs6+I+iKLpUGTGFXAa2DM8bZmU5J
SZJcfRO+IQltLB2JBkB3KuxsOju9Bwr2Bz4u6zuOU12S67ZR6ENSJA4vv2s16Crzz0qtJQFlCprl7/r7

cdAr6UQQfWEA2QuPuxNOc2H/KajAHCEihwtqdlA4H2W2+NNQusD9tGDq3vdA1+Pi997vjVDadhpJxST7

/LV20KNnME5ug1EXfi27j6og230P5T49Ybd6JZjxTq
RMT3dvRaHXxtIoLKlQP0nSb/djoflK+NRlda9Z72n14dLaXNh+E4OqxCg2QUoD9j3Zi2Z2l+qm5JYsVg

1sy0sFNtOfMSLZqPy+zthS5AisWTjlVQiRrbhZUgNwSx5SJmnXeK800EQF4pw2Ty4zLAucWrmz4cZ0AU

CVpHJ7Vi2a02b1QWwo3AX1gCo4OiTLROktowPq7SPB
8q3OEwOs8VFtgoyVw4mVT7MtWe5zrqdtbj6LaBiSph5/JdkUsQMm7lSaX/OjnnYbBCzpZKMrrSPk+1dl

Cm6wwWzuZMvXCQcrAvpVuddqZnBl9CaZFeLZVp5yO8yEo7o8PR10KftNjmdv83BPXMVPfh9I6dPJhYF/

QjtZICZYPAKnlHMnXpU+oO0ByEEDcdsqTt79HimXVu
GklzfhPM22Wg8cH+lBv4A6Rium9Clm3h5mTEnhOBfJ7k8gU/xDafyTb7j6yoT91i9Mlsjc5MkmSRcDM2

V2/gbzZ/XCMDxr5WcsdWH6Gq9nhqIXqQ90O2dWUBMhaxuG6xGe0lJ80fwluAfJY7GpN+829CgXhPLbXc

dC7f0tcOC5J122U1lm31Gsqw4ODJFULGB0nEabelpa
JVdT5qdHLoel9pU9HX3xNtb4sgRhYcqK1KzW5vygUA4hB1y++51ROMe1d2PvO9eK4hux/wlovfrLgJH1

UtFgGHsPn6T/da3Iga4fQtfOnQqc0OqfA1i8L4OO0fXfPY4yceEDM2/J3x2f/+dWtbv1UEI3rra54cu0

JjMEk0ye2vMGXPVVmfinNMjL1zcf1j3eeiF8Ob3NDT
JRSagY7mXZ/m8lZuAailyqrdcqRwbXRVBttSmBJl2cIcEWSeoztNmLNYvw5WMqi1gJp+2RyrWdmWOln0

rdM35e3oE+/CVT2io4IgX2LDV5o9CvkBth8uQQQzmfHyZSM5s2KyJ6seqtQpk+uN7oS0OTveWbttC/cp

TTqHejeVfvBRv8kciQEumBPSnhru+YuuTkp+iQ0CS/
O2E6SzAi0Wywpgq1tziG+RlHmNkm1/OCONSQgC8DvJBoL7QD3shc98qb6jeVw0KvG2vsUuHqAfyD+Fanta

aQgZAjgG0iRcyb768fWw7hA1iQ8lLMr4RIgitVO7rjr5+pU0af9DPZK0QQ2znAHAkLzTZoL4CtmEhHfe

s7jVNPJudII9nVzlIediw8kD8z5hqUZr1HdZ1v6E34
/AAMoYaD39fTcVfeulgboe0ZcBIIFQagzbXhiapCBBnswqvHPkGDFrpfJobd1MnSU60+Ymts2EIWiwjr

JwW9O/41wxl9AO7Yvj+LknT33bvNWbFWtwrqohc7KG9tc1WzyjUoIaEOGy3aqM5PVswYLIwttgNLhYPL

xRf48cp0hYpkHmKvcIXZuhzD06kcOIw46/FfXABqUo
RJIK1toi7/nj2MZ2iw3KcnIWii9JZ5FA4kKvx5E+U3tpH9KeTS+XV2qcJYVCdVn55pLfBtWZ0rGXwAO4

sOucP8YWUDFSaXTDeI16PgQhg+tvICj+hx2ICiGXhsx9OEkPERiIeZhYdn46l1Z5lW5+mM1lErAglnrw

D65hAjMM3HGMF4dX0+cHt9ZayaxN5TgI2HfQJUhoNE
M0CCEgpZZTpRHt7/7+R15D3ZPt0W6ME9uLju1SNl7IsZgGuqq0i0roMO+NKkcBorpwjQ1fUX26KrGYor

7cwObLPcGtU983XdcRMROKp/83/i9guB/rt2hUM8X6v80mPD0JRgfw8xTxM8Y+uSy7gUGLm6/f9Xq+yl

/OGeaMa//2J4TZghCDKP8B5b5xOrZpN4zItWGvwyZ5
QLZ1RyAiPQE/3qqQJwyrmGtJnh7KWfSRRhblnz8YgL5i5E403LIh5up0W0yuArZFm0xSw59K2AfaP6Xb

fXVqf+wITP0dzWbRoS7MigJGqp7Rnfpt//BZ/bF6QilFeaExO/w9h3QqO6hfij9Ilch79PjSPxs+vAh8

3rc0tCyNjFV2lsxrGehP0mDm9nkPt7hQNEk7k9511T
SzbagRQazKdRcd6zLx/C1N66Oem03dF4d4fZK8sHD8kRTBh9QzbmiLQNCJxfu7xTDqUg/WjOPE0utyxh

uoqxjueW0lD/ynm8gR5v7by0UrIvotTAaScwXTGYtmOyZJhrahsIL7jS3yfdGcKIbztC3WD51N5GPjyF

VzgKn8MJLgLTtbRymV8bzNfxq8Sq/oHJ1SP5DWcJxN
4cf/aUnmDOcZK7BqqLhTJVBuJ706xfJk7nuq5JCgkI5oyzP91m3rc9u0xc/jo9QhoQ7rTIpog3pEk0s7

qNBp/Bt87A79U4wC2R9AN1AEayMRbFJPkybc0G3x9d1hSSLnDtRZu1456ySedQK8ij65eQ0JN9CeoNw4

/BMa34W00y/cwy609GqLUxCwdAyrzMossRPJ20JKLk
nTyMXPVIHSml1ik6SDwIBuWWs64NiBgXFSulyZxBohgQjPIyfU6RTFQv0PdoMKWzHNVc4isQcQbU7UlJ

51GNi2y2OtOMQgw6eE2TDA3WDg96ALJwyew2kriEBgFIjb7ZJR2a1z6JZKrGOYgXOMh0IM51vaKk1Lg4

K4t9MsVY9Wu1adNXbu+rM4jK4uYC10M9d2dahqco4J
OEzBAd76SnaYivbGME18WqyR9P1BEHxdWBEuHSstqqyWjYDpXfpK381kFRsmgz1yu1eS/rF6B7fatSaR

mw+JwYAE0yIUCXp0O8ESjrROYfSNTSZP0Kn0QqYtU/0X19gsx0z/M1kBi31IKc/5GmWiyQg4JZGRkD/p

B4MeNX6GCr0W3ntNQyfmMfIYsBbkSKmuRdSo2GiP61
zr6uJNe5YJ8ecR8YNqf7MCES1c3SUILBAMB04DkYUlAmbWT2BO9BvogWKowG18pFAFCPK/6KRP+DEM1f

CYcjm9WRIzNC2LdTYlTR2rFXfOoajE4cY0OmDdFL2yy3iy90T7u2ZsKHciqhLRakREsShIx6n4GRJdlk

dq9SBjjB3610L07dZKM+THxwZNoOUxboXLrR6m4VHL
T1Cv36e+6LqYMjiLWxgY7JuAbtCN6IXGfpCjmoitI+cDil0u6DAvmv+GlKeN+KgGSvAVdQy7+oAMIgd6

GJLvmyZP13mVfbc8mbWaZ2uW+wWXCZEVLjNKSIdB4ajzyEwFWzMycC6BIotd5h5EJQhG6y8SvaqhxKIX

icRrZsoM7D5nTr+ob4ZJhxM2W5qIzYHSiijlTo7G+Q
SHxrsiH/fz5TcMvgmah+WUfukKIkU4IqiTOjYzV25ByBnBKmGUilYoAjP/fovF/PR0Uo01y8Ggsc9J7R

VwJYAeso9D4FZ2MRAzRkyzp1pazLZAgkiltOaFvtYWI2DAvWGv9DFaHQrxEBGhOC63rk+QKnPLSt6Wqz

fjSjEZ+IE+Kfk1CM8DCrE3NIC/MNT5z6g7+k5JMIaB
EASMox0uEzfcQMqWJggW+7lHkk/5QZ+mG+ACqTz7eVu7nAv2juVcO1pVNnlXTRYb4NphavsAfhGWCHWL

t80X5VNBbKcvPQQKl5gWjrW6sySroX4JuzGkq/vCkJIGHakEy9X5W4ZdU9mGQUuriz2/6DSNwolxdjto

cZefO2YAP42Qb39jCZbzLb0XR9IoSdthrW4/8JHISO
t7opu/Ulm3D/ign/OPoW0z8DaiSjmnVHcITppOkyy9Asa/3/G4sbvCQWDO3jODQ4+k8cE8kUpOtHunHW

cLBxcnU84XgolhTURnvV2Woa7FwKPBh4lZGH/0qkUGX5djrFnKIcVGLcz6of3SXGNd2gJN4Kf39HXgmj

KhhdZJs3gvKQ+ZdgaU0RHVIKN/iVdpabUjlqlpiPgE
E3TOZHR8bde6GyW4szBwR1ridk55fmtMdas5BAUKs5OPzP0+9YjfS+lUWWWPhSY/S3j5qb++MSveSd2H

xLiVDVPXmBD0QaItvUxP8UGNN2BVNj7V32aiYWHSwE1LEwn4EBiNAFhAmxvAEX987TKeB76hoQ7pG33/

bITsXKHtkvxU3yLD96nDZYfxczZkrqdd76UsAtUwpq
ZNEn+qYns+OxAbI2GEXC1wZyCGaA44lLN508N7P5MKbkPmX4laH2a1Pi0L+5hm7omcw649Yle6Plr4k4

wF51jIT0UHMfjNlJUhupnMs5/d0BJDdmsSgf4tuEfHte/IkBhlBytx/URrBrprcXB+8ktft9CU9LGc7A

cBuuIN7q2yzPT3mvikysgg3DMVABX7pVgeLAZIew6A
XTSv2LSt4+y21FA6ZvuzCxhVptuxlR/hMKwYPh14ql7bv9HZxLZahzPpnPUAxaorgdGTI9C65BN8jxuH

8gFxb0fjqwrVysI+H+dvfcYxf31nSTdXnmLBsJ6vE++eZdIqODVOhjH62TniL11Gzx6xKndwo1yc+mjF

QhWrnKG3/ess/ZrHjLliTa9m349N2xp3CZJySgUHK7
zF/jkmyasTmJ0DG1/++lHQmZgxB9foqvYow1OeF+irxo26dLxLkDlT2lVmY3utnWc/JeX0q1GCuN0B1M

kU+sIQuY65nRI+ObDf6Z3R8l+e1hI0So+f6K7dlQlvlVGivM+zU3R+j8401TZtgyHHiGdgxygum3JF0D

zq2L/gKU8rSM11rb135hAVWESgnVVTgXbzS/hjio2k
9O7W1+zH5ODQSEn14i+A5zhSBz8KOtGLgNOpQTv/fmT7f93ggT2230DYi8X+DsZkK5qTnMBHHMlslTpb

FmgMVdYbwBQjGkZzbuySqrGyM66WpbWMtMzP7C4gmDNWJZQ/WscugyxrVFDTNOtMGAr8l8Qb4u1YiemV

Ej4KvDRZ/7RMpFFOEY/API8m/B5VWwQmuC0S0QjNra
K4AUKvAQl0kGaAcMIuA6V8sKZmot3S1ctajFZM4HXosZEtR1BYuH7H4OfSqr9Kw1F5xeNE+ZaRUgf6Nz

xd4V98Gky7d4uUU7hTb23Pr+SjCmKtPNRB8wFo2arXSHSgy2WLwurafw0hONu3xi50Ki+FtSGz7l22FB

pamEgOkzdIA9yGR6526PmzZ2PsEVTE5UAqCcps9lsI
6/JNXIND/eSyTtPJ1W12I7NqFfv7E5v1Zm/Yk2WrCTojQUrVTHXZ37UcXThm4YfSPElzRURbhmGssyfC

MaQcnGdIt1OE0szcRLxQ+amCqGGtp+79UdX+2EuCXCzqjkJGSXKuDrmoVOcvjOX5ey2y76rCvtTMBEHV

ewk3PA9N59N7iqaJq6VnpcsrsSiC/WqMwMVaCxUeqt
sBRg4mLamCHNuWbsEdTkMUvBZHu/q31opRW4qJVHaVKVSNfCfnVOjOgI9DSMuUoCaEa2EF8PaRXl9EdV

2GkeF2YKD3YA+RedyDPkLf3WlLDv774ddDDvVNgYD/T3BVR66TM2kZnPcR2fVFJFd3Obqaq6mRoujkGy

pwaS+FWmQ58PT+qcWNpC1Z8Hpe7NiOkOZ+9WpQ9Pmv
0AP6RHDrENmSKIWXohTubED2E+WkUDc8qwiW334j+a2LzjervHO+eWtp5VqNZsC3IJwfxCfEB8vRRqua

lUyWSIRw1KCzmkUOTabphDaIN7L936TK2gahnpOl1ZLekw4Si/aQ3gL6ZdvjVij0chLnQqthNCxE02gC

OF6RLTfkde/6GQnXU9i+0wKpFOSKoJDTc31B7xV9ah
/rv+ic8NZsvOS46lPrJUc8VryJ0g+BHc7UzB6Rwfunp/Nws7Ng+DBHZ1X2DtUY03l/qWbX2evDY4GfsY

HnU5xAMKivKi+BLNYGcjroY9H2VaKLrdtzb6ZkFyflAsQJ1MYRwEvS6yFNn6GZOWxJk4QXw47g2ESWr8

wopFNrJ4x6gwYeKwyz99ncUz0IF3uJtdw4sEy+dbZ5
g0UXKHGJkhIb9TIidfMNmiF02uUQBtTHMO0e87cJ74g1DCItEeZ4Hjo/AJLlBoHQecYOFwO14S9wRzXs

MpWBPwoolraPMPQMlaakFJfu5w0QHoE8Vy/PgFebwpoRlZVkBnP+A8sTLk1A4Fte8g9zsxZM2jPJTO1u

Zt266nCvzoxICgd1+v8yamHTlI8Emk537Ncd3uHZ3i
lb0lGcxQWO/YjuIsiEJxsI2u/qrLJEC52yPBvBFH5uZFieLtFROgLGBtETJdGAzpKnJbeuzExa8erzn/

cHWqde3UMKvLo7IzrO5IoIWjTga4xy4dQjUzg9m/LW4lm9CswlZfnDGpDvK2eB78ZTXJtZOsYeQBQa3q

3neP+teF+n7jXmx06G/oYSdFvlY3+uZEuxr99W0DV0
hlgIZDFmcVackUrUTqYawnemJt6iZtVDJTdUdGIf6tceI0x48a9BtKvfdqAur5xVH48nsnrSEJZp9qVn

8mx9o8IBarXt46OoukK/20diRPOREn0Df4R3VGcmqAAqqAhoO9IsxM6yavIiMm0LBtuGEKtHo89BzCb5

Ev0bJN8aHLNSeoJDn+Vq3whUgS+laQJeYAr+wI5mIc
gjRTMucNoSvCxYjWBnhSGtYG+w6ZUHn4xxbZxnt7hFv+WtfyJ0+ihZOd4aufiiX8n2nmZkYwNGrmw4KX

x4vjuIjfb2zDGguCgPtw2YyjahffTkRRl4514HtfrPyhCc8Ajz9LdimpzeC3YAKibEhrZ1ryaQXsnrGY

AlwUztiAWG+pU0UjlcYB+KAhtolVs73+LaLOVdpgFn
ZqT201fDGLAnwwIRMwzGU3VRgWqH+wbT3tW3Kat3usZntu1YMnc5RkpgX9kUCpJoFdf7TFTg1gWMdTnk

sNdHg9dorLRCBlWqXJR/5sdvZXWaRQzWOu4JNS7etKG4BTw7GDIdal6eiGhdtnwcBzTZx1Xjb0EKjFbk

UqBI8+tWKS6Ik8sKomxxeYsPVfZwSmRDXx2H1j4TwC
BFUxjsnuEKwzyzGqe+k8Ihdu3j2wl+3DOdatrHw6ecP0oWrs/TH/8KGaZjxKM+E2XpalO3zk8RwVh7pn

h4F2fylk+n4vC+Ssymr2tWuF5rmcCWCBwRmCTu5kdTPo9dIL+5LSUtmUTPApkeN+UG4UupMuHC/UHOyE

I95YKttQpGzZW95nqX/zOguv+UUzBL+gvjP1Wbl41j
GljWi1dOy69B3zP//ZoYIiBoefMKrHlGxPYamxKR8og/+D48sX1HRnCgyte40Q7TGPf/e4nJSxDofGLl

PrsXGsHWyp3Ny63z7zEv9HQMM36e/JOaYnbNhRyECdHIXdXJKBYQYVJ6ucLyW220AeLLQAng/uRDsoWm

SwKxCFv7tLnMiYBN6yZMIntLZiftE01pn5ESsXYwSf
F0X5PX/cfZP/5SjUcpwp7b2wv51N7JHG08vzSlF+6TU7MY9pfRP3gqjiB/VQ9FJdftsZVldRC+ffUf0M

oY8kmGHpxnzEYft3+f5ZULIbhLr4X5zskQXTsoYSgIoLaPo/m2aDtLE7ZIH3RqGRSnib/w4oUncHWoKf

ioj66hTfQ8itjJ+pZoQxcuKWwJku7p09zAS4HTjjXe
iW7li/dx9Eo9qD/errMEz99HKKmmxaxTIf87qy8h3DmIxZ21b4LS6deZwSIKHD1hc2d45zTExHuNJz9K

Zw0yDRIkRAqHs+VLIeMZzr8hx76/8mzElw6dx+XWfXmWZ4RID7PsjYcyRWynptYe58WEpoRt1VS3AhPS

tKs75dyIq+Gb+OdfoR4Vu3/Db+M2ERbfOlQoo5UJgH
ticHx6ESj/J6bO6BL/zQsaDnIjGdP7apQUT7uIq80IE5yNlrdngT4F3gkRAOA2qd7Y5ZFOxAO+kKz7qz

tKoSxHGm4rIR39O/bzFZAQML8BVv65FrYnSyPsnIaUckcaiuZsMiGDxcYPIs4i9Utifte0K2KGJIG97x

szRgBleO9N6WKiz/OTWcBW3iGfVl1RqLbyZ+EZH9Eh
DNjYaSfEdvlAg0YKSWWpVSgwyubqkENvUKZLetZPMl77CqIoK+oMhpgksPU9xKDbv/RQ+/3WCuuI2aR6

IUAYZI6Y7/hxR9C+BYNQZL1ICR89lEIprdPcKAo/HlOVlzaMwBddyIM/Jz8t0zElRwBiFqt1LWzF7hqI

MV44LylUHco8Yj/qEpvzFGjEXYKMh/McIS1mFb66zz
e+glstYOFl+j/Iy22hEM4kIOzNW2P/ZICKGLmJjB8ta/CWqSdm4OTv37EEpoC6kcsyz3mPrZUVrrwxih

HkfrqEJXivXRuyB8IS0oMSWhZLXaki8bz1Iz0ox4KBZU77F/idr/wFR2ZlWmodtffHnrZGTPmGEllCcn

b/maEWqAJl9jUHfbyL0Sn/yir3PIyyWJA71aU67Z+H
q0jVk+yg8G5x2bi3H4oJ2h9NViQyoTNRxKYZlHDiCgI1JypFdFpb37xjc3/1JK86ti2PzyTVyZJTRdIf

I1JoVZkSW0n/B3TpGwkz1vTvc7eYH44wbXAsWxXxs4DyptOTY38HEDBnjUL0HIBCH0OCl4nvMd0swvPH

c/5BxyrihEgoS6qziqa378dcNacCrBGDe95rGKlmjI
gi1oJ8qOnxYFIXTOkoGZ/aqH3boWFzJtiKAE/TYwhVYoyrj+Uv+F7ZYy9nxKyWCmxQ0QXQZw4CyRdHQB

9EQ7jIfmL/SuDlHHdYVyp28rJcjpl7l2dru0sk/z0xGY07g2+4bVMol8cf2VUU9Tp0bMCL3mWfGrmTKk

DSl9yY2lTWTFvnA9GJObqGAQL6pzUrW6hD9cBNpxRf
nRLZam4kMljtIfNri/E0JXz/4WSQ7nxTEn0bbGteglnExSU/qj+LafhkB4mR4hjwsrKxctce+zLrD8de

VI29eiSlGz9w5fwiWkClcOBuhExUQjnTL4oH6Lx7DAwbkC8OrBIb3AE+67jwVsK6BeabbhuhAOrd6NxD

Tb0Q/cLwWOv7ZBuTSmKXOGbazd8st2o57tr8d5e8KJ
B/ItnsVq8a/yTAQ11klZYZ8LM1t6/VWTBt+NVng/UVuuZKcoiSlBpOKUaE3JT2PuBM7AjZ1fiVzVzEw8

Q43RJBcb/phCXhUgOIK+RQ2ZBZtO6gCFVDY3axR28Ez9Zc4ayWWMmrh//rg51yQbdJO2o+LEOHw8IxAu

m5AcEtJxa+ZHiIv9CAO7mEg63XGHgfsIZpEqqFIELX
RqsRhmLY6FMIvW5I2kquheQSO9/kP/dQBc7KqRpBxxFC/UQLyjIaWlx0jowm6N2Doc0aqv9x+2B+I55I

CU/uPngcACEZlAHe6ZiIkONGegmpF4DeyvHe80AxyuakG7rupPfBNMqTb1ibbhkfluJqp6Q+8cEdPv+f

nQjhZtRxUNGX8t0CygE6QKHuAhfkHT/MxDrhEUrdC+
+/zyo+SNKJop1asEzXNvjlSaboQKv9+rmadEd/mYftTBKI6psHhjZ/AdES2GNGbhXdBD1cNvPwq86Wnc

t4m52I7eXiSILUPs/UEg2URssQhckITgel85N6jVMdsSAMebCNT238Z2twpIAbImo/IWgLR2YqYyOyHb

iqaH7f8voPwbtpC6dA6Ku5D63JOh+r/JFx+71glqwo
/XZ/tHAcuMZxDiYy9fo6qeiJbRvRmwgstAOPU/sxpCP8T2M6fTQPm2wupfLPqwvsr1Hp5s2qDPUKCYBo

gDsRxgBHkplBmomiAdoX3KsYuPELFkFLP4mImuq+n4HoKUSZBzz/xDUCDppN04ZqP7Uchp1JgLu465v5

k2lAcY2BzjVaRdDDo3j8CtjkTNx17+LLX4CGOaYcm4
BTfuJU1C5wtB8tebFZf8vjXMTQlGCpin41wpCHUGqm1RPQLKIcJrfkinTs3WeZ1fgEH9iLFyEvFYtsKe

BPSjVPDPLtxSCwMCSWzb84hVAkacM7cuE2EJJe3X2F9xacFTWbmB+iqYquxVt8Mb2ocmOe2cjsHuFxYU

5iKjCnicF9Eqz+TpcDmPCmSixq8PQpoJ0BlhS8VV0l
rrcgueGnFduGWkHQPgXUwsKwtNpLk4nSSlQyk/Li49Gmi5BLMzxeWqJPZ8XQFf8R386LInDJNGifTDTm

s3zntnCm327VxCnhECI2q1NqWes/Uur3elOcRizqbzOCraACeeMKA8+7U287tz3CKJvd73+1ySsyvpEB

/SOhoPdbWQWF8RpnhY0i+KH/vjQ+vC/ArhN/lHIn7n
XPKrhiPOIo/y8sOtOIbU8D0M/GAHyIbItBKnEfqb9Jl2SBe5/NLQhyxXhABSIKisFbPpPza6UAJwYLi2

NcUAwmcgtoz5dJVpnB2zQLf/31IIU+nhy28kH30fYHmXamuDQL7/oOV1Na7FOisKn1uoFGmNFNEBisL0

8LyUM3T1lynW7i2Daj+V+/9hdbBZGyebEGUsWVI/nl
kUZvzqMaqfepXH/FFGQajFUbXQEEH2AdZQd7EF4l7N/fylTzzIwqTX4kGc6GATVgegXnJUPYM1rEz0Kk

sdhsZzm5rxETKjpqsprSid/Jcu0JzroJ2QeA/ga0ZaifdENDkD8uyS5Fspn9DnUfLQRQgCUybf2Gxpo6

GZyKJ+y4H2Ys/VlCXKVtbThSi0BklUzdUjYVzYDn1G
VuVhy2Yv4eSPY+5UnZLiDfo9Xy4F3banNlJs0qxB8NrdEhAJqNtKNHEhgTKVXQgF/IkpgT3tmWDqc3sy

hXgvKjjRpnqGGnqvckmofkPDWcwRnwaE73yrv3vJPVdL4vxmqUOHjDsDdnb0JX/HDj6dzY+y+BtGyItw

l6I0G0nUVFfYqoEP3mSHthRg7C4dFdiZXCmtpuympY
4bRlEpmbiuwriobsxsHH44GOdhR0UnWToLC8NxUlqExqiVp3+bAt6p555B6naeL1x0Ddq0gwhxzLx8Le

c61nfiLY6O2sd0tgrMiAqBcmE2Ah+4Lj9aDkUJoHohs57GwdALQNd5/tsvnQcE16cibQHyPKvt3qZLnc

hWycIWv1gxA0/luU0ouypanFEz+dlv1E8yf0O/N6Xb
/Wu55jpb5RuEzDGKPQhs8qROmn4JS+fihMmgr4dx8JwFG23yIjMa4RV0T6KfH0muv3TaHH/hnhSPLr23

E5jHb52sntKlD4zxo7oC/bSzsFygFC+P5AsK30fgo7FQanwIzAnROhKMdSlYrtxyzqSb476hGO7z3VbK

IdSn4Cspm0UHSAOy7ju8lJEQ45ci5TnU88LOT1Nq8U
SM1yJjDyAYRMRIxlgkGp9ck3Jhz8faWW7dTK4e9lU6qExTmcYnHzgczm1SQt+7HO13sXzfbKxO6I5cvr

JzyaynfqYy4cEPj3gHCCP1BjxNeNBGKV3GaDoov6j5fLn8OmifNA8pHDeG8BBClvOhtHkzGzjfEiaZEE

lgzhlzj3J1Th/cHH76S0tFfgn5ya8Hu6QS9EW8mZku
SPL5Q73MPrMf5mSAEiaRgXmlnpmd62kXrOzWzkWCriq3O4BowLDQqhmeuowwbRLnnLtO1mAK+wM7xAsP

yFNkucl7p54IXWlstqzhWOJqkEch/f0ipE+3KR5S/qetLPhhEcm4fTXTkFqU1SEH+xDjqftAENwPD9gQ

BndB7pvBWh76NZXRIWUerxbNG5xGOQE/mphzBQIx3a
JumtWdsqEUxJU5Th2lhOG728qysJQlOLGmZxpMmiEeJVHrPxHxil3bDANB+qwf7uEmuHtYbguR/+Q4VB

AsiBvJA75j9L+bzvlNtI/pwcbqZib2iEWAaVx+8wDAafJlpFinjre2hJJoZkvrR/0vxoZoc5XNhHxGh1

1sIC2tjw+2GwzDfASABfkYrBuKWa1IjwoqOWQFNhL+
RQlbuaQ8bdV5qnAY52q9QuJHjBN1oWhyihuGOPM5PHN7YCmd9FLlu00bGwXRBFLGVK0+Dv1746aAPHcw

K188lkay6nfGdqFACsuiJiKIS3euwa44E/DfBhZkO8Q42g/Fivj9twTOxPF6FM1NUU1j9KotPxkdT6hu

lz+eug5s5iDXY6QEUdRygJKk3/G0yavIW+A+uISiub
1XIngEHD5c4AXaa5YEm4M9tgA2zYNB+5/p5lS4GBNCdlOBZohhZmUgCczbrywXlKInzGLfXigWCcSv0R

gls4Wy9kzk+W7J1YPmLwZCOcv9I7aT+5M5neC1yb/3VJBFd5KTtSKjnV/+DurYLa/RM92NZbzOxMhorB

MlWpcJJyyJYpPzYYeFALsgEJvp83AU/tFsfWCKD4XR
GF3DFo7J4ZbHu2Sv2P2xa/x4B0wOa55obdygLeDc6BatabdFBWx9EtE43fVC37zZSnJ5Mm6wNIBg4i6i

U4VtjQngl2sjM2zveGnBKrS/i4hDWaeuCU1O/XVV5EQqii+grWDMyj24jVaur/W0MO2PJVTMvuIbk6W6

t3gCdIIZhkDHFgDodGyiy+iY+xW1rlCZgcNemvCZPH
ONPTHMUnjfPXArbizo8DbMT/Z3U3TTMHBOch+KdbLwcsyGH+2RDy6VAjyEzc5o0j9b+S7zdbG4wghy2V

MBjppe4MDTAPHjQNjx8qwIdSZpGrEGyN4V+BD9fq81pLzKQfaLH6hyaeYH7eqEQ6eZUa4k8IY4PqA87f

e1Mv/QiS+oD2oN5MuYCHalDZqcJgvd8JrJAf0fF4/E
+gpDMX2kf21S1mvmtHUA/9wjKNarsZtLAtvyJ7m8ot75B0BG3FjvbMp3Wg6b5nsYA77mTQdPxLt5dvAz

MVVFfI/HcTkX/JtjuEMUjEp4YnjjtSMpk2H0EfTel9D21H+DJNffCmTR8hLSLvRe5RVpszpqP6BRXOi2

CA2iJ/twolQhkS3qQ7CFRc/YiKpMPqBYHsSeVuyQ3Z
gqJVobrxp9CnaIt8+21m9g1yp3WX5rzFIwAObwFO+ga8EvJmsd/W42g6GAE3ly+Yt3FvV+7Jt7g19L/2

cW9e3vSpX8ZyffOuNsKXNUihwS/g3VkZwJ6tj92xy9sPN/BeqgXh/rtx7zVOpVHzjPVOm9+LVBFJThLQ

MYBP4UYHOFL00RoP9sxzrX/GQI5BUvMkakN4nSkh7h
VQbm/7wqGk+JwJC/e7MpPqAOOlHxzCCcyoKGMVz/XcWiZKpjTnlYmINq2GbU8HG2EolcpRdqtVfA90Fd

/b/Aapxv8kgCfU7VMJ5zuDBLpGLmly3evfRz7h8s08uSx3uDuyJsfer1mcef1ik+dRRvrzLBwmrUHLCw

gt//Mmnf8/6rOqfOm6YBQuqFHdSmDt6A017pYbkbqF
43wMDsYxidSwsb1/+w7EIwLUnoggpn0JygawtRj/eiw40TG3YXbAUr/CCY0PO1h4wNgSDfrQDeDsfiDc

oqyNJtk4AaVLmzezUuNvAGWI+7yDtRePMk8MpYPUgWbE+eEL8+dP3kmKplR/4XJZf7JL14HKcnC1y/rm

gvCintX517ovwoHSf7cjCYKXShhWwbYcveg9T3uO8H
QKcn0A43u6CWvGcAx4o2M4qzgPYpqxtvp3pTNhBXC3Ob75t8dkGP9y/OWsGelfe0J9yz7hMBJCXhxaGT

P5PlpM7Py5+3OGESYuwMkQU0RseQoe+qtgzNgWo2/7jhlgqJQFo07OXwO/5Rqe9ZQnBsSQKSeEVULpcQ

u31nT1UA/Fon9/33XiT/UnBG38HsE4LBTevsGiEM0Q
y5zC8cRx5cvgJsn6/432H7NOv+FppSRqKhTdlgwtXleRV7oM5NYs7bxxSQX48m/HVn52bqVT4P73B7ch

qFx9U+vFmsXi++Delilah+6xnqXs/EzFu5+fiqwDw8S4eUpfJf5BY4zHB0qa1AQWUrnXloR1wIRkm9TmLuYq

DwC8A/ga+7y/GgIOcjK77StpnxPFP6OlOV9baVmZub
LsLTohrFMHIhNlVbq+56y8snm0etUj8ZMB3CauB0Cyq6qLdecPpZRue7/zTd/QOEVl+6uhyfe6PFKuOv

fsG/BoxM14cMsgOtfcMKtcRBU6AUFdATmfyWfiw4igPGC6vtpZ1ipZ/UvYFS7iu6z3AJvJA1b9sXi2uC

/QrD3UEuByctafFMbYRUiGwIXdAInhqvMfgvbhhwZY
bPjf3bjeAwrICuf50y6VZpCJtTey7T8vLH4/9+jlZMvK3RlxPPi9hm49a+RDIo/NIj36GVlGuGQPSTwb

rQcARsXT+PU3xD7hgYCg3SxNED7ZWhq5XQs9v+jI2NEEWBo6L52ngCZLvemEWx1ZD/3+w7RDnnw8GXiZ

5VF9KWBpyOBYE9yAxEQHK+5UikiFLxRFqvi0+UNTX0
Ryoy7FLegUr3vcjgdthWSexWs51pDkYeumgrC+LrxaHH8vDi/fRXglE0Z5gc0wyG9ykRiBzwbH+iukGE

LwHgL4Di8FzHGq7/HhblarRte5t56TEEgBYRoQs5MJ6GXsMlT0YlQ9eazBxxzOXJMkQhy50MxmSfsDi3

xaXqEZ943hdW6+JQsz+ad2fjHzJaOhgNFpycPG16X+
3Y4IePzZLW7uBC58oUh8/q5hIYqcgfckJIwtaOCnVe+Gtrvw6j4KFBGRm5kQ7clvVndNR/ninU32XwmL

GQZuQV3EwO6j8RXUDhAlXe1fKFlQ8zaaGtEQbq2K8dXnOnIsk5WlLat13liL9xlnyGXnWIJwRPuLmRgF

lFuvC/+HSxewjCkFT3pqchvRfG6rOrFjWb9tkPedjs
XppeVyhjL4l4PDCUVhlGmwPuq9oZDE6Ouns2O4JDeKP15j1o7miB6mDrsLpPFaEQlNX5/fkcwLmXoLvK

4NV5v59wrANRCV53PgEUiAFdjn70xjAc9ZcbayrPFj/tY8k1KWHncMR3QShEp3C8Nawp1gv6tx+ImU0q

0HK2XWb9xMzh6xsyUVpBAHKlgAph9l3P+8SqKsxoR7
iScNi2M5HAIFbvuJuxu1HuFM4EKVMQGWLx0oa48i4iFOuh9Y8kvW5Vryt3p4T5FBIdY1lAYJRNmRNmrN

s9h8U0hDOB4vpdDGI34YBNgBC91tjo+TgXg/lMBbOULeAU3nEb56nGEp0hUSQl9yw5Y3WhyXQRVZSMbU

MhPNXWzTIzGkxEJLGCGa6V0zS5O762uJiloVqnixl8
0JVyUMizDL/IdsNyVN3q0ggVr3HgaDTLv85ofTfzT0I/RA8izGaVB++iMck0n8pNppI06DYmfHxdhiXD

W8+NOsAeJ0pjnQfNA9o8xeL+fFQE8s3zKJiLq4/ZhngHDjUdKW/YSArdS87kO6eOepV3gSY6JQay3ZRf

59805jFl8UKgYv4tGfIhaYqJzZCruAkTaYoH6JEAyj
7WzYHX83AJY6xlYvYxFDl6FyggJ1QaTo0i5WqRZYfhxVHOo4SMs77AUemDIc/JsJrj5ctcK6rLLpMx8/

NZDiUE9VsHEY29ueSa5mk73/c/IU2cDPUs+Q8AdSyp5ZhNwjcpsejV4m7OJbnz/tFlLfszHtAClT+bey

6K/Hb18XcPbh2rpLpZXEKzWo6CklJ35e8okok2/w37
EXOCCc4qo3a3tO0VP5mORQR5+fgOr+m+111t7cw9+rx0ihpafGvwDGYRLd0TXjLlj8tyf3xqxCJEl3sJ

GDwu13LsxpcZX9R+oTRX1Ugqi6BC6SghvZ12xmGIXiTZTDhi5fKVYv+Dww0y3Sp92B2O94qdnsrzbJCm

KgR0/H0zI1g4RuggBfPJCeJ+78sWX2o3RMG+Jd72mX
UQnhUkplmDGzhcs/U77ScWDv2locQNP9HXWITVchYrTFHTbYIj2/ufAHpTIy9AZys3leAow+yIa8ZLgQ

7yjmPnXinkfldqwvTXlyHbkoLI9D41d/OCSFDAYR8HK9RkwV+ZGZD2T4sqxVQyJe2d7F71iyg2KTrE5c

Uel8672AjC0A34z+GTjm1x0lBAkGQWJF48ZesILfHG
/C4894RL09MzcIXIvvyPyr5ctXVL9uqTG6Nk/bkgvqt88bpSCh91Ff5iiqZzMK06mFfMRz4DvVXt6SxX

sV3NMk4P8okJXkUcm8IPG+8aQWcf5tPS2ajJ5K8qXY8Cl4f62/mRt81o6JdF4q6DvESnnaMSRwXhLnqi

taedZm0UPMcJ8RaLngWNT58I/5RDxtw11gYyrUehcy
/2wWP/61oo8onstDdlwEhVN+CcNoEAAJp70mq14uEzWQvEdhPZyw2VuljrUPR6WTn+J+jX3u4er5hf6k

yzUrN85pgCdOnpct3ciNrk+Oj3DfrYsBwBFbGb6rF/r4Nf5XGg8m+4RjpVKi3L2Iw049zrcsJNmDaKCG

sibm1NbEwZdrtxwE505aTBjm3D+1aWFDGy7jI15Vsg
ffd8/y/JcKS0Xxh0rbB3GvpjNALXyorURUGlegU41yFD6icjqUHmk4MyUsGcM7szKib7ZA39y2FGha+d

2uUWOx3kGpzl2mEgTxO/CEDEkBqpOc1eLoco1n4U8ojoZDUN0sfMXi81BFqbZ/0IMZDIcPcr7FhhlXto

xJJpcocLZjH7nv9U026siwo/NPKp1DNBf2kqG+NWG3
hGEyFnamoVNDAole+MmUVlTr6wQnJnwlfM0QiWZuI4BYm48kCBjCYdlf3uUic7jI2hCzrpynFsIWf+TH

I+jOACuCQTm6qiPq6t+q3MF5t+HEFPtwSOqL8z0juDTuBctiytez9go/NIlkadVaxbEQCsLGX1okWsmy

Mhl4/9cCbU7WRdVhTkT8nYKdX3dloJsXb2SpmUaEo9
Ci2Ifc4+M+FXjJAsfmxpSd4fGZtVftArsuHQP6TtH4OQSGLfBr3lt1m5+OUWDr6aft5w9u9hDTbuF9qq

C6tcTemTY204KL8WkDN/blMZDq60Januk3tvnMvdF2X6JH/Q98hxt0Mbsyp+arwY/q4CIZ5VH0pa/5Fo

rl85AgfYOVUnlAqVgpmQ2AO3LnsAOdsn1HCgm4/8X4
8nEw+f0Tv4zOEuUfK/dF/Rscn55+2TbkA+HXiyqkngVf3FGUHl722xIfYmW4HNuv1H2fGKYTc5SKJMwl

DoBLuyC/cORfHto74CNGuM5x6h3+GltNFzb1sdNbMu+pL8L9YuTVfVf9Lx2TmWiMXTRS2/J5WEw/X3x0

F67c6TdCf1nwafq0Xl3mPuQZ6uSu5w0n4cFwCeg0yN
eMadOG+j+nm597bN5VG7h8HCj/uCl5QNyK5snfUzDL2q5+CV5Wd5qH8F7z9/IT8LbXNOZBwPQzBuWmZs

/VIH7WQskvMR/tI9K52jpU5lgonl0+M3m9BVhynm5vAa2bjOMcddbdbOuZTtajoNwrwR/IEFu3wjmr4a

RNk36Uym66gFSlH08coCyiECf76asZXVIv5h2XnW1l
B1ZZ+ps1CQ1Pap+beESrhxO55Vgkt2Xlm8+agCMSmuhcYUMGZEnA3PLyoi800culzBrJ4rGY6JbiXy2G

fcI2avOANOuHNR01I8i20LX6H+8yfOSvLk3DsOcTxgqn0l7kmWea+RFqubUksEEFqM18xIXpyDq7fe1Y

hO/mMX3hMlJqU4t81Xl8EVx1HG+HYKJug7lMvpf6Wb
pft4pgvws0nvlEpm/wicP51iYu785DerqxrWYYx2xBsxLByD+FM2S3J39+TBPGMcLPlLsynz7xxRYYFm

CN90d2KaFtD0O9NAQhTS+M7uHGoTl5tiyuGLujSeyf9+mze+hBVX0LMvbnWdUlBT/k2k9dE66eNMr8db

VlnxX5Hl+ffzsX14inH21v/JGFN5WV9n1neFKSZO8n
Reo33qxJL216ANkL51opC2rI+ZtRIHmvgnTzvG8B/kiaXICt8Y00KhLF0jfX27XBLnjLvGCVwH/2l5z1

C8s6v0T6d+d68t3YrmcswLvzZoSseKIdpYFtFeb+XFgohsMhiDxCg0gWzEVccQ9Ys4kddz3VPQMYUJZ0

hOpU/078o77funOhx6DLBRVyOVIk4f+izNM11wxAXl
Wxdqcky9o/X0MPr+/EFixgZIGQjndYMmsG0GVgTR52iamph6lKg6myHIWAll7LSm3BKjnd5ZmWzUrzQZ

kcmv7qlc+OPw1YhVIQaQS+HtnkHQ8mWKy5us4E2szrpndlv2guay9Iw5K4BckOqYRZ2irabtsDqfOvPt

WpVJroxMYfgzB8txB9ZL6dfRTUatNf/xl6qNcdzUzc
CYonGpuAGXfyWmx2UHjffXpqohjDJlRHVz5n4ixcD3IM14EwYPz/SyzgkiX5MEUMi2j2o6Oes1fuvgwi

Q2lx7ioZMaeQbekvJe2HU008LdSSKpYVuVugwakV5AgsatflOw6jP3YJFtbM9KphRf1jx/WyVTvZRRm8

WWGIvmxLbbEoxQIFq/NN3HtLU+5Q3qZbLXYSxS9Wkc
0eMQB2iYXSI2u/qT1p/RcrqCIAs6xHBlNR7pV+LOF5n+FE1dxlOCe2HpV96oALeaX/e4j0Gw+6S03ngc

SZcuEIiahBzeZ72NVbUctHkyzC3anByJSv/3HL10yvr1URhsC023bHBrYHeYgEAdR2ji+7eVWbiolRbu

+ydumpp4/4pezxL9xzQx0diTgQKIvVY1geINS8Lvi9
jWpPP4XjyhfRw57MA6VMl15plWfN3TLFvJewiPMriSrFdcICWz90ofZ7XELKNHhpOtAfLz6t1uq1c7Ow

ZlKI4vISMkdqrWNF65UBHflwqYF32+gTF2hIVtrDVvNu/74cWYrXFGEXk8YaHRVeYToc6JOoAscdxd32

JKq2XNhIWgDGdH/QLHoB2nzFQ+/FIf4nQUGB3FHBOE
r45g9jWyKmzorhxSswWVX51LnCRIIkjU/8cs3+dLwzq9VVDPs7pofSnbW1KNn0n3K5v+vaNg5Hi+LYBq

kFwtgTgdkKmiBnRk7iu9lv62E4Qo/mfr7Vis8+fIyADI1I+Kqo6RLncPLULp56wkBt3slpuJcMig3ypd

j1Bf5Izht4a27uZtSaoKLkd3Tp+K2QLLWx9ZialnMr
8bk4gKocKO0sN4eEMuzye5PGmtX0hyvrCOpWxV67ya56twAGxLLBoA3E3EWVDffpoZLBkIKfnrWMNegg

dipR+rtAlTCrBm7DKscldNHCmOw2wftmYogh6OZ5aBo4ozCcsdDv+z/7Ox6nVK89hnvadYSB24jenLVr

G6Z/Ij6+puTjmwNUfGtdoHeRrxD8et0xfTJC/pLmp3
Z21GJO1Ab9GLu5X38ZyjPxjaO9N7it6mYaBc8S1lsGp45vk+l2Qknc+GM/6xfhMyrFckYUvECN0fwsMm

5DKq4JRhPLtfc4aHQLMuGaehAtBLjXDdQbFFZ+YDqA9XB2F0aQaJqrAe8O9RirxGpiSrc9xrZ1MSAkNE

pEzCOHufjelWUEOUaK8hwD7z/rwvsDlz6hU1hAdPHN
Cz2v3ycmKQpTinKmgag+o3zCvN1up/LI/jKqhDdskXxv2//+SYom41/l/kfGnh/k0qAfw84s+v+u5p

0uqKy6iHSLLq8XVV8v8wRSBz0p1R99K3MMOV4UL50z5OK6NtAef9AZNfvBseE2WhAyw4+BzdRKzL9YnK

XL3+qNlYxMjNA6pxwAvHYZ/PkxnMo7mNzdp4Osi2un
Nkz+PkkQjOHbu4Chm/GsOLouJ7YDw3MgJ5XC/QmIXorPnohcLNwiek+cz0HfqKGuWJnstcs/q+nusbS0

AwjwyB8JOT+BvTCWKNfv6danDKL7bXHqitYeJM7u83uo4Z2o8wsHuyZSvDcoqaayekjKwBtFOki2TgvP

Os8G073iGR02D56uGBppalTU5hm7lxHolcMhvDeYVe
E1fIMwEmap+Pb1yZaovF2BbajKp0oSxUxVyzDALNTFEqCxZs2fsfmfDFvieYX5T1xz3WEqy3E4Xs+6lP

tjh8kTxTbVn8qpvZ63lc+ePu818S5qT02kHHA34vNnvPVi38gBngnIVAmmGztO0xD57qt3ZVXcJkEYrD

/PDeG9bF98hktzZ4eEsaS4Xtg5NEAfOTIWAxpf24u+
Pd00ckqMsNE5zxwJ7vSx+eSDPLbWt3A9SqKN3SWyi8Ym0V7wBtdPRK/ClngC2tJUt7r2KQ71DME8CD+1

8dK5z8Qml6L32X0vC/fGIV/EbdIZ5DB+rDijGJa40zFr+1ZkMcf7CQTIA0BTu3czRusSICAodVZblsIf

5xYyi+JWBr6DF+8JeIVPj3rDxdTeHKTEDm7G6h4aNo
C9EREKfV5Zm6nKXLMsR2ln08AiO0B/aK8ngaKQzw7Z9/+AZNjGWERHgoZEXFvfwA+KYjV+2jefgcYyLR

x0qzJc2EGUBxH2/DQqO33jeGc1cDLPi2s6QjMi7tA+AidnJHh4QJB77pKuHd+CZgJfZLxVEhC6S8bQaa

t7qtmoNgT+lxOc4bqVeSV36ReBy99c9RiAjrlhPq/J
Bll0uzQUvK476OQFMZa1eHZSQctuwUUD4u0iDVBW/mDfiKbmzB0ncwcCcF+HyN8Oov0xE8Z4FXLdqQC2

d42wY9H4MUSsjrDCqu6sVtF8KkYWTOp5Tm3xDafim7R2Pc3nYBISd1uCJwhNA/9QAiUj7EqzGopt67e/

n6rvz+LIANE+mcrpFndsaiMxOy0gmF/uhNI3ZMAlg7Pl
JWTmoGOxey/FQ10cWw8+RIDn52kRS4Jrar8RoAuCC55FytIDPn3qzYgFZZnPSvZl/BAc901uj686swKT

Xf7sGDBxH8R06yohieFJR1ZW7mwSaO1SXImdZo2gg8Yh1XqcQJzrbu5Z/NxDZ0opD8juDIv2HUyNT/4A

0FhxEIlEAgA6Ixl48lwA0ztdB62gywi/xQC/CpWN5x
X+JNJmVWKPcGAmYnqWZV/8lc/wNscK4uWSY/X/pMPtSt/S1E+Q/Ma7p7Smw4BKIYg4/XYC3l8S4L8meS

ZXdNLExSUeCy0E0THKAiZR5GmjmMx1ud0GpqX5RQC8+KnSScoP0+JQuXUybEiuh8B9D0sjO2eORnjt65

FarWpnVPWqVZF4gru1wAYV6ZnTiuLf0mo7CViq62dt
VblSMy42kOeiqAYuccMRz3jn8ZuEbHX9wdtqNsS2o/iioLaCzrIeMZVKqEqPiyUg1bS7pudBugJeDte2

6YyAe3IBzD5K+2DB0a926GOhdgYUDZF2hIrzvk0qP0CX/cRi4N/gFWfZDZajCzi+mFx5Cu27JmA1pm2q

dL3MYJmtB0UavWDGDLXjXEcepFOQg7OaVejune6yc6
73ykaMZYSWXHAIJptRzRXxzrJmcF9i9a+3VEJf0383tReIxK+L14ewXphYwTWVU383czydwnPs0dCKLy

SF5voomXFV6Dg34W+ZUSVS+qW2B2EJZNDNKpe3hKHFmcArxOdeknDrMm3SasnzelcoZvvDl0caJPyy3X

QRFxOSauZqUEWJjG/ZUWvMdW59gq+35K4TF2CurMzD
BRHq9EauVVgIfIuW801Mb+is4dsZWbwOZxBM9fkvsKnnWN8qo0yIH0VwhayODMjwvnoj6zA79QVUrlBT

DbVSYFY2N6bVCd0TEFgftrnalurWBqryJcEadcYOeV3vu4eU7Wdib5bL1ahCxKaek4HBIqbRPkqlW7V2

RWR/kDkDJCZwln7v3pTvZSpAZavjpOnIIZVW36cuHk
Lt35OnthUXGvScCkzQ77z+LMPOdxIrP7B1ES1hhJ7cL/CfAh5MfgP49UV47FHadUxQWKV+25/RebmjBY

Onu4J14FnLngD3XB24aVY6CB5yIU53RwoDXqLWSnnlfcLEARUC6h2cF5SqeUr+XJn+dm0JvOTX5C8Eu+

huMtpxp0PfBk/ayMJhH98mKb4bp9v1Kpt8x7cmQhk2
V2fpmTwyB7zv+X6nTAxBTqraWbgIYX8fGWtvgHi2QiKPmT8eNz6mldKx6wN3FHkHEXJM3smAYCNeCNT5

VmFs9QiGLF+d5KMXQs4lf+tvPrrTQL+8eX5TnAZtShm3g6Pkhr+3J5gY/XzFY8LnujTfNnBrk+SmA4Zz

q/qD6RobVvi5P15LCwJ1bZZ/O/rCAN9iE6ZnAbVJ7f
r92nrtC21GmIA5PjtzuO7z7usLJ/WclrJZM5l71Ay04h9lotEhs7EGxbkechS1cPiASjVCZwoK2hKeyh

V8KCy+kTBP19aazEaxDidzBaxJuVpdwTeadBuL7Di+Gu2X7KIqbzCKsP14joNEY4KAI76gwnKZGz0fuh

5txhgtskEeFs3UeDfwQfSOfaILVozT2t7kvCH+nawl
rwFtb+hbda9WE++IbOq0wrfmCCAPdHggUNvAdQRf5tF4jN/7CAlNiTgsSe4preWh3TfCpm+wx1tsO0py

EXu+RyJfbNVYNSU3UOlcnokM6fHVDraoGFvhz0Xpr3bLtaywCY+FIRR8Z2EjhQI4v2s8TbSSd4ROpTG7

XSIdkymZ7uPBOAok5lqjyol13x/U0bR8VAmLJV9vot
gBUjvXFDyaUQn1Hc3Li83MU0bgC81KNN+dYPcmUPeALVwr9zJZSequv4R+S02I5ID8gpFPnC0u/L00Lz

1w+WvtvNtVoFzJuCJgpnvuUEyj9IirZ+Foewf9xcxqTuHYpZ6EV86vpvUapjimAUYA5NU/Qxy1ae8kt7

ryIVN9lwpVvrHao3Q/uBhm8LLMc236qdu/r8jgwOVk
UDrOuegS5hGmJ9AwREp6Jiaz5CLyIyCxCQrmkEMwJwDNq1JBalCqjnuuOjCdkeDdz2ygHO68sUH2Ug6c

A2dAuBTQ18uwOdC2H/+/qojDq0HUyQ1YacFxAeQYbscHsv251wzvtMF4T+dj0tFdlPfO2E9N5IvlRzdQ

pfB9VMH61CWdIkp8tnAuAf2zy6URX0ZkaX3M6Ees6E
cDC38NO+lilXsTNdZHvPS7+OJKuGZ7n6B7nueG+aIUbf4E6mjEteenvH3kpF+x1qg30NqXgpLwvAzs8P

JNzvvjB+m8HYfaPN80DTW9g582ebbwgWJ9yLI3/lPGTyKp01EShxzNteaPf7/B/cYeBw0h1ft8vg3KM+

X72DScEolSERKsS019l9vWGXQhU+DqwmOX3zF1ezCI
6tc7W6E5swlwhVdCXj8Y6TS53FJlIpRIbKYtfBZrtJgRQa/tbBY14A+ZxlAB3cRns6inxhPOWzsqdXPa

g64KY2nXFVh3Rpsj+TR0zUoqacxnBhU/ujyTpgtVD7FACjxesmdJhC3+iK32fBsdwiv76MF60ASJClAj

JHpOe18s0yIcdAtpB1b+QMwNceyaLb1NRLAn+nNin8
1TahMzxr938qer5WUG2jD1HfGrfdrbGr1jCydsjb7SGQsa+qGiGPkWq9czY5tFxr5AErc78e+9B2+XEu

nr07hwvdy0rQFnyn/y0sFye6trQ5kd5ig5LrPmZ58gTjm0ay8Xq7LCGq+KF8TxLmm217S0hxTsymjqN5

3NiRB9hhFa2A/c1Gh09pM6SseUfT0POHNqeO73ou1M
TNNOsb8Qor8iGln8SQ9hRquJOvbis32aPEEPTQJLRBCvN2kIYpcwiOLBnC4FnUFycOzfPyZfNRnnLZKI

DtqOqxtE3wMNKcDGnA7T5Dbvcwzri4oydXcWhYCUFPJzDIFniTat3a6hXcbnKO8xEjS4jHa7MuJgXgDu

VA/1OL6f3hZNWjM50mbwFunhjU13FZ4Sk+ARIaxFH9
HxcoQ9e34on/gBhr1QCBHR5lqvwZ34u2/9t3cnvayx4Vel7yaU+dq4vphAfe+ZXJFHeqPz5XKmtWi/YV

IQRaD1VtZjuBzh0mqaG45vyFclsM7CBhTqFrRJ3rYJxBvZ7as9zczYCMRNwuLXvDgVM365ezkPVDzJCT

d4Ir5D57ahMiKhGKrXwexIdgDB9PbD+Gt5IeDWfPlR
SAln3PkXTItpzc+puJZeL5VP/OitlYjr1bnLXanFbC27GUUlzuq2R4gpHZb4wwH1WscquBIk+GG2L+r9

nKlvt9oHk4V8pY4zzRgSKcBXlBiOncdAtlpcj/pXWnH01BIJl/bM+xMZziI/xFgJp4YzWcTp7SkKCJRs

qcxlxasLzP7fktpndKBJvbrT37KWFCkQ67DJPlEgqG
F7zvpWpUKE+HT+5bce2kO1i0IEPq8AjYbIAcpiyUC6xqmybszekAOs6n2pOiudnBdhrc6V9dQLuWoHmZ

ftT9qilEHe+EU+KbW3aCeX+zP5rR0K8AN7j3WhDXsxJMmIpGNXmLxL4tIjg+doQ/XIZUrLUJ2TKhgaJl

ln3ljqiCrV8n+I5xwVRnEhrkoqVMzkQA925Ga5BUc4
ILKPrCkLfsTtTB8CrikzPFgxDdWB6SpJi1iw+SxW9zFYjsNH1A/Wpm1K6EAjYL6nHR3xs8PR/1ndbpex

SmnieYS/7eY+NFpgj5uEJ06ejN80j4k4hYsKDvnxSf+rt4W50RqO7VhQbeaWi6MyNXNQq60iaRR683mh

975Pdm4/rg/eVuvsPK/H9Jg3zKieIBgcfS08FjOj8C
1Br3VfNCzWYqifxdWMXF1aEyLkqDFkvCFHdX+nJ8Pdw2hLRfQZ8PFpx6r31uXZtQy3FoxfMj8Oo5dFny

Gli3grxmuoHv0loaUNfsk5pwcitMTjysZbFGgu1yQ3Lg7X3vHsoOKdreY9shXSF7aReMg+ly9Z3ETKAg

9D//g8k0uH3tNQYZPdc7BCZetYGtOxZNteuMIKEg2P
jAaUkhXB75ghJL4pe2RYVejCX+Nykgl8mfwUw6s4PXHcYQQw+ZunL+wy8ekX3W9Pei8uqwRUwdORDyzr

spK3KI1X0ofq6ks1ITbrGVEdrlapVoeiLBR5AZvwEGZ+g1qUx8cn3cKHrArOiUA0tO4kuuBmCf/JAjsN

d2BUEfavJFoyRxtXxYmzt9h4prkp8eeNavdoup/v2F
YsNacVXy/Sm/WtP6zkFRqzHLj//CKistT7VtJUF0IOBbR9+DYtxpTufpvkUqrJElM1GHViJMX5d0PebA

siALwMJ3cIhhIAniPI5ZsAfUE1WpKkBDAb24KEWCQtF2pecr8nQvznqrcBdMJxG1tszfH9gde0AUgaL0

bvdGPlJXO9x+OYnci/mjsB8azQehVR7Vo2UhpHLauL
5lPkkHs5fGLcI+Bh4ePPXKDRZwyT5IWoZO7vtfWt6GwSfH6g0CX58hyp1R3BqNStbRZxtu5mZzb5L2Zb

/8ktZYfuTjz8MmuZ8RiTc5r5HZ06rhh+818ZmFVXjOMl2lZCxGE30eU121YItrCOc2hcV2jDHpJGFVh/

0gT/rxnNMlCnJjIyclUyWrQ6gnNDAAA3ryJdm0ogq3
yi132MXbhQwC/q2IWI2L1q9N/EYYi2si/GpK5VhB6s/pc2wPVK35fzhDshGc5lqH6CRJ5FI/7a3SUaG1

CbCEIzK9eiCLsQ6SIj20Bm4nrj5tQlZ0SM/ZzoQ770kbEHidBPAtamiAdabMkhmgECXN4c/j/tRhQW52

M6qLvBI9mcqZofAqTaXnLlkbtywEf/Bd2Cg9t1Q3M6
R+GaP2hf3ZCAtWS7fJREGzhGtB07bK3NU8qeSOAFZZnA4f5GhvwVTc8meTssAU0LSx42U2eu3wDyn9tr

826TaE1pZgk2nN5n3ui2BDGxuK7hmY+tYVbuMHOQohuKEUEFjjP4T2Zg171s958j1a7XCa78on7hq8yM

6MHdfuEXdpr2sYJ6j4GUJD/jn/Xica+3NkyB1EYXIi
fGD3tchlWx7bdh0se03uw2YIG7GceMAlvI1Uy1M+otWy5Pp+j7IZCE+3Rz4xNcKFnvEVAnFrvDtgx97R

6Y4qpDR6xukC6f+VNfzwBh1S7qRV0jQ+ex9o4SYX/y8/Eyd4rwYlDbDcf3U3O2ipC/Y9yEriTADL4SpO

2QhCYm8TA/KcSCmhA4gSH4hrIWE2ato1+Wzk6AEEn9
qEmh5VWpfHt3CpXVlxskbM9pcWz7VYqg98P5UWuLX/9niChUm4cSowB54qBc3/NOXj/USN1Q93e3K77v

ss0WFl2iRkK38dRmkIC3KyaTDYlu8ogTx/fVH8rLW34mtI+4glsVAjNLVGrgKrYLq61AXzd7OGYfKiw+

XpIR2SCXw7uHkRISTr+gFqIupiIkaiObeElGuVOmKs
QXdMmWV72lOpeBB8ccqrhPYaymylz5YRKN4NYZER5CusXhbz8/V4f7877felWBEGM3A0aAVopS2rHFMC

bR5boz+bsHAPXFL/OA56HYadd+LsayZt/AxAdnnWyLIcPa2RrvN3rk8qRsmYLRSlgVwud/9L8ZkopYEM

Uvv2TBULw/pTxoaALrDoKt1OG3bSssh76LBXkaM49Y
hFllc8Sq/p7MKqk6hdXLmbAVYQsqBpgKwwSZZ/GKtLtgZJ5QvTtlmkOY4qhWwj30MkVnYqqEgTz2lu2i

fjWsD6ilnnsGB8U2joRAHxPV0+GPjmpKIpi5TIg1Yv3icSS6klZlsI/ur2vG2QkV9mj4GjMy397cyjvy

jKBbOA1WMdKgcKOPuijz/iS8uM5u9uHLt/qvvvI/es
ZcWu1h3KQkUTKmTWYlbabSxg18okYH+Sh4cXhBQ+ne4/z+4gbGwdDNx3NHb/3YtovFC91Ow+ZpC12ddE

p44Pca5j6DYrUX2fWM3Ut9ebawXpP/9DINgn7k9rAzZ9OF4S8IL3dV7Fmh3MKlg43GBIr9vNx2MqMNQ7

PgyQS660iiIQI83enBRrsbUr1RYXRUEkuyEoLYG6BG
OXoHnIt8/KRF7BCzTO2LEIhwFwtTln8TQatBDBLd9KRs8zQn5tqLZFceh85cfWUQW9oJAOcqbQYo9gDA

pMHWDKu1Vcz99CtQJwxHpZF600xbp1ShXsaqTnjg0OvOjt9eR8v205YzVTbTk1abqRzrQOa8jo5MNpva

Bm7F7ehlRggtw9JOyVoNLynltjBUi7qEIuI+1+i4/r
ueBolYJiAGvj7kmIJsMs3g12bFe+33+OOWwGH/sfKKnKc7F8eJJk6ndctt93d1ey+pYgEe5T4wQStmPM

k3K8IwG7mEV1hFPi+S9R/yc5dq31LKOClC0M3WrCwMdjzfdNVAjVk2SNp4ActTEs+oMl2tgp5kZ73DER

Vy4Ipss+RsGN7Vgqpk/w2H4ZesscpEa73/suyqhidN
nxRZm6HHZcy+ZaqVO/WSebhLGSCVcvTY/N6gsEqJGIYvBXW5d9Or+ZUah8d7KNDFSwIt2PlMsirrjbqt

Q9QsRl1BwgM85xH3tqCZqzUUleq5AwYCkwatAPzE1XOedEf79LBcNelTgl5kUdBNMe/Cdq3lbEiaem6+

kgj2Aidvd36iUkRvgIdph5STAoN/wTsn09iGiE4H4s
09NsI6tSEnFSDy8lD7cVmD6ojw7/ofP6QG/SV7kQfdubh5dVLFoVnXQ6InBjvVyWbFqHk3XQy1tvzeJH

9Thx8SzuEDez2yuwZxaqqv/azSP8w9mEmUjX4sdssXIzSP9ANWbhv8M2faTtJ4azuCUUJaXroAjEJ82A

975FdlGC+VQzDomu+G9XtMYvZ4VHZ4doFBoSp9dzVV
rI2mIn/zpVI3bS396fZryuaT8rRbYONa+4uBRD0p31BlqPgfpRFTcBCR0NekpMhqh7DdkOR3BYSoJObr

2gVJKOCf/2aDaBTPh/XhOO2JNPDdd8lgQ+voxaRTJgTlEURm0up2Wv1iAV4mNe04C9224cAfHApK1nos

GpsgPzRfLnV0o0Fow9bs5lclJwbe+dYCUHsKdMkR58
JlwNMkeH7aHrew6v5+SPUPtHK+OYeYZhH9y3QvBoWj9oYqeJYzfHpPCmoblnya1DL5EmQf/W7YGN/ccY

j1EKqhMcE38naSMgDVkmDJlFMynYFdzPXIHqvht5sXsZQ5MhLhN8ZsOwf1lquS7i7iFHhhhmjzfBKY54

KfAun4D9nVHkkmUhKAv34z6bO5cOJIDTFfLNnlG5I5
yON9z2jsE1BlcJE6tTNg4KvxMctFMX2RZDys3oSNgBI7dRcvPW+5r15CUjYpbwcjCqatbgUVsHbnlZoC

+LyOSqCKE6jbRb1emin9I9mDbeKbaVLWO/yOuWdoln7O1E2m3saIAVoxDlzLPRooqs3CXs0Uwgf9T4Gr

JO4lIP5/TKKMhWzrsOt+IibhuESN4wm68Vns2Wgwya
lQWgNbDMA6oSDq+V1vbnekE2t8Kd6FoCLBf1TLuEnZ0VqsWRp50tbTeAa917MDcYyO5lIENeAtXDngM1

UyTzlieQK7VdxYx//BLE5OuxJBUR12FuFPfGirtysZGpH/AGT/tKdsp/5yhX5vA6Pu69d3Da9ZCxkTci

rNMa6xXtVy+p8sPExXeMz6GpHAJWkkxH9+vhzz9YbI
39VZkNJBLnF/gULhPEzkruMddD+c9rRhQqnRi+J1OAt44zDaOhj7a9rvGvJnE10KHbFEMyO+G04vTz0k

3A7uHQjD+hS1t05Wtnalge5qneRpBgP009GITCPGvdecf28bk4rzzmDLrINaHsk2FFWBoZVEnm23Zw6x

bP03Z0jlv2O9A3iQ2kdOnsZ/7Yej1ugi/9OQl/v8Yz
TDm6y/4oPOEe3yk3gZ0YDA+8nZQKsRKd/KIZfP+jwqMI9Es1RwgTeTteH/CgF7vdJquyjeI/SJlxW8B7

6s+M6OaUrls6Qui6sA+Bl/9kKjY95fr74K3/Q2FwVuGJwsD6l1T9N3sGjaQiXC1nXOVUI21kq6+X2O6O

PF1GpyoXWk5xdaw+p6cx5ZJIWadurSwydQ9zxLeBiS
s+SuKeSD9mTE0dMWihvGteu5+gIhfmhV1iEgbYMNCArIYXbzWCmcYyV6Ifl+qh7FAqTBzeD5C4BKrzV6

16a2I70VeJqyjpG4Dm6WCnMwhMtwgs4spVnr2cTRLqN5ntNqDWQ9JKHel/jElxEAAPSSVuN+U6Hoe/Oe

96vHgN0BOzgZ+y+mdRAbX8cv5+GERw/JNiVO5yi7hz
UJLJ+sUj4yd4X2xybRyZP6c3h7cOZSVssz2C+I728oKT/M99iX+kDQS2iNYBYuWRkXjesS1zUOA2f0m2

2bIJL6HjdogTZ61IXhxjWzeMy/CyonRJQfkrto+2yClBePmthxvP4kNOon4Z3A6o4+KZZG6mENnsWwj5

JiJ1Nsl47SDN4xktl+CTqzTZbl/7WTb7l6eNErnfog
qrJDZnqIjFVsMefpmjlHCeOMhgoNgn3URig5lFLRpqjJMaSEj9ypBUAyywbBPfwWqnIiVHMMnbB39Iqu

0U+5T4Mp9g3DXn4vvCV5B3TtvKlsIQcy6GWQc7o77LVBnVVZgoP4u5HTIGmeW2InXbgRoLwIz3BqqbBU

ge7TP/dEL1HkwBH5bXdYm8pFwlVwAQX4FcMQXal9ou
E83W64dO894EdFDfIz6ZYUZ1wuTatD6n9kEQhXXViXF1dPLCI6I6iOQiiNRf6d0zsVkeTYiQgFy3Di4d

RJGUWz41B8k4Mc/lzUGvynLzH/6St1SgscqbnjmyIKA7qUvPOEk/BFWPYwTObrtbFoJjM1SICde3oQKe

w03F+y4I8976VdOfcy2FR418Gfe3Y5/bboQ0kY/J8I
H2gMMPaU9AsZqupqs4C6HbISOE6Bsj939dy3Q3MssxknYVjN5YbJpQNBj2lCqkXNZ6ATwGzSYEkDJvH0

Tghs12uaGtrgUJS1CAEg3MohHe1cYg2q0FM/bcY3DDhoPCZGTx74IvgTQ8ta2SHtv1m9GJpNWF88hhTm

2sulMb52h6NAH/5pMrXPB7zQhl1oI7IZodsT0UE12B
hzLUih0Mp2jC0QDYXWl2UO9/10X2bHDJMeStGJae5YifUy3UBbIu00JukvtDgg1Q8AT+VbnHliQ0VblK

pxf68+393iCfYe2d/O1ZQl/Rqplq0PY3Q7AuUzW8mk5bAwMfVCsn+YlCcfSFkAevgont1LE/DROUn8pB

uKfzIWC5aYi0J4Nhc1XwLcXrZcbnxg3rGT/jYDvhRn
rmYhDErXqq7pG9ByxSA5jED2nTQWw7TMEY6KwQNspas1qY5hvRwc+nbS2IL2WCdRv53OTw3zaJaJSEOq

q+Tc8J2FCyBvFRBFVGaQ/gUdwr1k/n/1lDqyNcIvP+ZkCsRznCpDW63oB2k9kuLFbmEJxkgM3k3O5NiY

fxL6LKgU57yEXLYgP6XUDGvfSzui+eEsB2iPj7GV+6
/M6jYj54WSP9EPUhX27HZRpVqci/g2cIZzXR1+kxhLqZUiMKS8QFG9+jhTCIfORFEjtOMO2mSUiQJa3w

YCwoZoVJzgoKsp+VTk9qspdsbiiva6AiRUiZOGBH2K7o5DTN/d8zfAz/xQRhYtAvlUBhSVkAkuHrsytm

8beYyCrL5faG+sgsX3/BVxgzg40rYfFBKF/3dQqYn2
m4GiHZRI1yx+YjDPO0NLWDLHet2m8/x3vBk5We+T/uuzngNeUJ/f5LrbX5Aa8BXbqJ6bSxJQjdF+ZGbm

J3Yhq2yDA8+siH1gvXtUYojRz+Z61neDljDq4yxua9h8xp5ZNp+MBa9WtI5qKA6115Ckxkfr+n22CYfg

KOYHi2zUEZNZfx7wydJOJ/Mfde5qRoQdGYp8LT+AqJ
GhWspDtoyjNDqBl/aI2sAQqFHrh8A7y3VQJySHI92VxQoPuGaqd3nD/idI3dUfysfmk+hejcShfjfqut

57DariUT45CmQf0rK/5kohezOxNZKGzkw7zSSjygEQqzwk37FjpxPayWrJKON8tQfV2Ny2C7ItBPd7RO

R8N7QVmSSwy3+hS4tcq+R0nGlgWXyRm4wYoc33Ko83
1cSvjrqlYXdam+xDlvADj56kK+iJF+lzNUKOixT1ofYYqqeTrk6ZEE+/HXnLyA9dk9OgWfM1qhM7p+cW

vmPXmC5vkNJ8pkR/2YLzcFkWaMowlZJaZHbqm+qQcUThl63wuz3IsKFS1f0STzW/vsTimGLH8sHUyk/o

hJIMNiLsYbASys4MqzG/yNm2K9WeLiysqEhyd9KZ2x
v9sz5p/yo8hilcIgrsWlIG98XIX4i0ohLkeL16XGYyc+MbJwzym+JQ8q1il13EPQ9dqn8faMbOriIfVA

MhgQXdnOwUKkjiFGBnlc01w+x51IDBYRYPPxuUBJageu428AHAqnTvdfjL7fB1hlgASc7QYtLfScZezg

2kRo4Wg9ZILCLTsblH/rmZ4KWpmqZ+O7udgDV9EoMr
AxybF2u7Wn4M6nBi1GdzLSL7d57ujL6jE3XUTtI+Regina/QJ8OiG84PRPHLBjWOpE9ENYLUwFrZC5tM+t

5Cp5kknjSmN9tZjzyFRqtDYkjM68ft/W9om2OO7OcOlNJjyVwwmel1QRiuX8OGbX9HIRxZ7RV8+h/Uwa

u5RgrVgHon7s0VM7fqEQgPgqjxiEInp1unDlIsyWAs
xkqW6n9izmJLtz+54bieYVvgLy5cgQDCXORR/tlUpAzJ2rTfQak+mNDduXn1kDsK4ZG+GejBkndsPfei

W3psLZOMj9lQPVClXKkCZddvbzJ4PC/eRkbBxMySPIqJK9HSIcFKmESUK6IBmln4119yM2s47JrbMijl

jErqUtHeJu68/9JYXi0l4tB7prOMEeSZM2f89ycGNV
w2tNIapDSQuKj4L8rIhxXmnG5wSIfokFIb8HK7kQGotXrTZTOc9pcgLkBc/aN4rkM2P4EH59IvJNToi0

no3taBrh1CIOXx/szlKBATqsCTXkFLHKpha+trV0kamcQ2P3D0PAplZo3vUL9yuFCX86E3RpjXaD5XMg

Tpo64q6q/eZ3/g2/vdpSwHoB/GpX4OIU5PH+SsLCOj
/nE3nxFvtp/hqbPbgtMVbotCKRQuukd/GYjo0WPFunqtWReFSuJma6ChaIsv8zjp1v1Uy0q1SlTJ2i43

QMX3hDM1snpf/YN+mhb3cyyc/BkP0jZKgsmjd7pOvJe2BpiuENbmyPguVyZONHKPvYn46BhF5NrVXMWZ

BvoB93TCwSNEi/Lz7tJN2cVGNSPGcsPIgNhbvZOCaN
aDFsZLkQA2zkqcX66X/Ugnh+sUQZrWVKsPEUOmAgvQc7a3ngT0Vlkcx7GuAjURTtbvOv2cMB8vnn0L0j

EtaYzV6DKvtURFNhE9rkL24I2wOeTz7+D/Hja5QdyydWCLWnXnEUYb9fk44nl58ANPEBP0fgtfVjImgk

ts7T7CH21WgwAU/bexIuCXCwCUl+9cslq/fj8QVwaR
VWEG6FS+ljlHws+0/KSXwyUQQoaIPPEOUMfi1g2VRQSYoYxT+TbJem84wG6Q/4z/F7Hvk5gR0UtTtk3j

0lEyNvWrDqXzmw6aP7x/Hxjtjv4AF6na6vcYCHJCz88hJh+9p9ZjrCS5j6PYO0LrR15NWHT24A22xPWR

r6npa2K+NhauSaareliqCCayzBnsvR9RskXNRqewjf
bG0TaFX54ANq0QPTgAYJJj7xAlRAxqZHAZnAtTGA3U171GW7WvmSQ3lj04dOCwvZ40iFxNSsnpFyub0U

6a88oEa8zRBAw8/ZRb2qz+tVt4tWAfnTWkcGP0jl9WJusf9QvX/l9nkb1q89MEZcXywFWD+gAQyDMvw9

jV9jebEvFkDR7nSvytRg3zEEEaKB30QdctMxu/YtL8
EYmFS1yWmacCaa+vW7pfseEmE+I36enPCV84TVVnAcst7bbF27QGKxXVvvzEeM8xMChdBZBCqFULKRaR

4NYUYNHcdKOyPUMuCMRJEn4+m11kQOkkvW4g1WH1YIuAu5i4Fo8MqP5lvPufkc5uTP2bMC9z9JQ4BFPk

YxX/jvxKe6+KTvo8+katOl0ifnMLdzcNfcUb2oQ2W1
lH+eaRjvpJuLkX5a4VhFmR5UMbJ8zs74ez1VcIp2zS0S/Bdz1D1l77G2xd52B4WshHt0jRSLQJKK8J9F

NzGg9Qonh7yQIvLqO2LBTNM9QBr4u7W7JZZfA1sgwd+0abEKvcsU7tKRk9kGZFS15VkQosuhNxtuR9p+

rX/FSK6a4r91PaaHU+yvVHlQN+YnMjdnbUwtQnymCC
AkovXTxYax/e/Md6OSpnGjx9i4wCFunEMCGHtPOA0/1iySNJb3ncE7IpJcxKTYWfD+RSDzYHtzafcrTH

A5xt0g/Ez1E+zrQiitCsii8eOt74+m68HT1YuBgEL58hedb/GHZaHH7gmHAjEnH4/cWz/V/7b4mVzxMD

nVCmIgUjtN6Oa83dsvSBDqkWywIQOgxf0ug6f0ZVoI
Y/J4n+3ie344Ik5cPH5kv3wv8ou17Xq9BLau4SV5WhyILkw81NLWb8YZee8rodH8vKRXBl9DceKBobOR

bg3su/Ae2gpzzlF341yPbE675lLDJFFW29fROIDpFAd/bd+9Bdk2WVf6VYrTox7/szQnFjbgjG5IBQhs

Z9YodtdlbICLM3I5+RTZIXjTSSEfFbRcBePW/UOhKu
+979/h9cQiwB2ouwyvie696A22WDpP/GziccmqCTee/fIhemXXbVIo5nZiVVoo4xEJtzeTgHzDV9DfoF

hrjyQUdYn8lySq7pPxHL+Pt1vtZxckkt30Mqaq/zoSoi5IAHn00lrxxMzFY7vxv1q7y7Hp5OotE+35i1

QcL6uxxgtoWJp/I8o/5q61cl6yoVBA4yI5wp1qJ+2c
eKvkWsotGPKeXl4cZNN0Tw6YLrZF+QkKpGMdj3zaD+468zwNsSxSFxGKrz0pUD7DmHyo9qLif148U7mH

7oKzMh2fF8kz30vq2g1akkuYtAb3tAJRzKdPuPLaJxZDpUzG/AvGsHu2E0FNXq39MQHI9ObNDm+jk8Ru

y29kuEIouVaEAQTpi7Z2ae7XsS1RuozRLKFpPWb7+L
reGGrrpf90lZ38AQV9SCfNUVK3MjP754+6/eQZH63FmSYK/kwhDdGkb1y/9qCbAZMSsCkJ+PApCJ2a6i

y80/4hcKz6lMSFh/LqhqsPSWqHplMQzs8MTZ89MyYq09/OhilozMhLNZVxi5H8wADGAodmQHxn8CYSfO

4cGofluVVrvFTsbnxAyM/MbC0gi/1aU/x917YLVOUk
3VqVo042wxizL1ZoOjpn/lkZdbrpjcSiD3IEeYKcSWumPumivGQ23NdxK8ZxNcHzYoolqqegVnrtCLMs

BqxyBzP362pbu6akYQfuzdrUqj7hEQOzX6ickEkSHTR97ZKuNVmFq0jFaHhSVP9DKpJ9mfATf+xutKKl

Y4bXnnMQLxC7qOMCWR0I8wYFil63E+/6xLAHM7/U8n
14QXdk01a9DmO0H2qH/UFJOwLNKe5Gc4qErRcS1mFHWvlBAIqM1w0Z7LuKxMzg51ucgy0+1I3+5W7CHT

NvfqDK31Wa3C2humud72XEGQ588Her9ahqjTliLZii1qNKAbGlix2nRYIyqdIu6PJignDl+oHsT44BnO

+w7/B4GbAVjJh0/2QrS3CL9ZD2yp9rvFZX7kVCRvw2
qLF7bTpGLzKOcf6ayGNdGDwMxBBY6+2u9qi24iqpNYDcmxe96e9s2+dij/3tah05nqytSyrDYlSsvv+c

pvAhGdO0PbF19C9wFEhVyfSAIlVbWlU//VoQCzyBdOTz76WJ3d4Rnzg2K/Ms6ppnRzXKzd+ULl/xnR2f

9xr7zjT3l9OekmxnAjTfaSlulB2r9Ltlvsm4hmQX+0
AnCQzFUISq62B/3o5UwZq+gqchqg1jwr1+sggXjoWVQqOjsjPjVX0aaDYAVPlbZAD5ZhQZxdNLJ0nc+O

TYvoQHNo2Q8AuYv3y2kF+1nQU9ibRTpMWRGD36pwROT/8hLsqBm06snhD1R+vrnpr+OAG9k06sTTkhTM

sp8dYenvJYQw0InVUtdWVm91HKdIh+OvSMogsm0QMw
/j2aoqPcdfEp5PE89LJQz3xAfV/3lw4ilJK1xifD0MdRmjZyi/R59N6y1CMTJteUwjdn/o3iaNcaeEoS

iqn+OcVevev9dikjlVXM1xeEWWwMSi+Mf3h0KeRAig6J8l97/WiH4PaN/9gIoulVueCbrz3Uzk2gn/ZS

9aADCbl9oSywVMzZYv6+skZ3xcuEhvLH8X+hS8cK3Y
BDJy7El5+pqvlIFQfOOyqTGX3ZStGtpY8yThd1cJhuaYojwbRpEpKjWCwja8XcAidHc2M0IsIVzD/GPI

clrmPvax9tvNsh4fs/NvtO30gZXc1QY+t7MfrNjrdjsAzxfOfUcwHK8ZNjGGWckI8pANlyCT4QLJqFtL

bF7xMzhN461bbL2CfXobVwtbNBBXw0mRgqT6Jn3SH4
tMrlQ2pUEAu4I+dgg75eP3k4ld7Hj3yVLCVyJIWl2n7H9t8A8JLT3NB/ldlZpwGUqShsyyBHJnA4qOKf

O67aM7FiuCzimnKepfIFyKp6sm2w5AUUkQA9SO4c1ebOgCZpwv6P1LO/zqs+RoOOorlKGUhfbqeCukd6

gnhnGLXkHunDDzrJUu3ZeH/JOFylo5BBuOrZoTyIP1
npeANPFOL7Np68j6Dxepx3KxhPtldrwIm9F0y0rZFgJJtTfo98wHzxMrBmF8o8GLSd6wADTv0o8Ae7aA

BSNg/ucYVFnI4QUuG93ZYrT+qKiyv4xNn+HlXZAWNoaZwB+yM81JcUVpUkzfDPbrJOf496KgdfeBIA2Z

/kMsQgDliADgXbOiyzLEcXjm6etAwMYhcRw01x2leG
mwe0pgFk6HvNBQz60P32dCvsS8Vv0LlH4nCj4C0OCQIurFhijnYIXE26LhYGXqYfrxnUzhCG3P2WyTY6

HfwyeD2jT2dyVEjw6l9A8a/rmDYXUUbvbF449GItDrQYnAfkryEYSJQL9vitSNIxjb1grIyMJhxoKYzM

dGLlGgMuKC/nlFl4mCmc5qlRbf6JLNJ3PaPyefG/Ua
3HsG76SVryFnQvN5wptMiKUlqzZT8Mwfad2s3KCf7t4IzwTXbxnBdDc/EhGEN3YIg5uD+S6URKiZsRzb

8xGgnI1uUsX+laiutF0oUw3vgN7osp9SkU0Wwg7ARZZ7olSZc2DlgvF7m0i/u2ivb8YRpmDhlM00STxT

SFpXnGhdLVfowIGgsUr/1p8tjlgAR7AFrJxuAYAsh5
7P4w3OWzPs/BPm/T6KdPWThUQRVQlHK+BfJ6VcAO8HFww51eGMS+0NIvBMFN0QSZqBBB5GDMmT4hGoXF

JjIBCQDZi42C9RJ6ivUtsGwdeun4R/tSAt6bIzEhSlH/WzVOhBojrctaMTmhhxSjtJ61bG+44g77dZm6

EfSvuF3UB37E5FcpPHJ+SGj+onddauXA3T2YQU4IZj
d4VJ24PtJFi6A9xq/6J9ByJ7uLU/B9u86robIOIR2kamqRLlkbA1WWZTeRnAeDftslBmOSALGPyi++ZX

0MYB4Gh80Mb6efS1ULrncBU9WS7I1j+G215ZRmBsrwwHwghxqWK9FJEu31APiHPtWmzPk6audBKrr+O1

0Y5j2cMSddxasn4sh2Utwsm5aqpVZLkyKMlBVPJqpn
ibBpWmoc42bPl2tZXpJYVS8V/jxK179q066+loDg6D5gJQ9eJyiUDX/ALIGFm2ClWGfM/yzlcn6vJBao

AY7kLv9nzMtSsOPgDAL4+35yagY49rNIaotPBgVjNAEcr+EDIEJmg/3HoBQX7EIiiVto8E21QyGUooZ4

viSD+eCNt1zyzFe0hVwif+3WkNPNV/71mu/kNtg+dM
85e2NIWthdrqSYBJITVBxvADzO5Hqc2XoJZ6lHkp9LLnHxH18Y9ID8hQS0HYc3HBGdT3i1f7g9/3lGIQ

Jey1L+KukVQ9TRtfE6H8kTT4CtHVrzfGV1XzHOtrSY4gUm5kwOwa2TUTPvbpRKDRMLEHmcM6Wxrp8aIH

m2vIIUxhNFokXerzE/S488/UIjNC5O5C5VzUtCEcgm
y6hdNx+qkfeciIFsKZ7Wl/gyWOpnxNWPZAdbnFa4/+EtNlr/sTyEOyj81WI17kVqOmsR6//yHVjOCbVs

NpbD/bKNSgW23T0hVbKHhShZ+yXn9uNzmAhinKj0xMvD7s/FxgFcNj15TM7JTqf8yFcYoJs9pNKUFt3c

ydxPz21NumvBif89yMZdlzVMAvCANVjtH3IkGhflNa
sX4ouVY+VqYKf25j4+tZQsRfFdiJG4JDgqMWhWHkOmiKkk/dIG1Q5orrA3ik46IisR5Ocr015L8wsEed

CCzPToSOdHqGIk2BnHMxATI8kT0ApRu5vbojiWB3boeL5vsNTK2GoY2/OzQOIyYOnt1wg7aefI9/ZBhh

yA5pYdoW5bZh6cBmt+dJJ+7G13O0TVeoEREkwq+7gv
XKQMWnEWaaa3FWPzFusuyGUXWfss1OfQTawZUZhBDqELEkZ+Kr+Wk1gN2LBrKxoa4LzYpReuCFeodJgc

mKfr5/T51X0+xnMKi+lkHhaGzQb8GjUFQkGbogHGfuz+KBANkVsVj35rK3cRUa18vemPvtVZ71vKPAhe

LUDimupK8Kxye+oaaWeq4UIbkzZoR1dww4+w/N/Vkv
+ratYIiE5823XQauxMtHrrwvmNyF2rgPIjhhw21RUiaoK1ZMrUkx8Ni8HKiDnzAkxma86CTBtrUCr7DG

QHfOvUh35TDST4W2l4okZ05KdBAjqFwY0k2E2Axj5PdbzlquBhxE1XJHtbDuwpyQ4LB9L2M2GZWha0hq

WvNIRiI7UVF2/umbd8g2z0H2EkuN52jZ41tUou0MsS
6XUsrdqPeyoXQaCOZPMHskIrTgqvj0pBWhFY5f1k3uqvzbhXh8zhllHlKD9y99M3CWCLQO1glg6nYRVG

TB46izQarAq0+TZZwGaVjKgx8kE5/TacmFf3lxm3jHqkiPcT6NgwcD4cMWFJ/QSBDcds3IDP1EQ11hzC

1PvmnDNV+Sz7RxwomClYodO2k5GOGKI6RUxLeOAeEH
lYoVG+X8Nkzk3ISFj3UwxEEFFG5uwfQexJwuytzNkN/MQbyjGPV7SKPop0ZEz+8qrYS8l9mOrYXMfbhR

I/Y/6iG+jOcG4C3vF9o7Mf1/dIfYZUt7S10lRQ4hLSuYr7NBv31gLm3zayry/Os0D+vL9wOPPoTQ6Edl

HmFEcsiOdw8AJVYyZ3HWsmaR0zYSn6V/L6czOsXWZ0
Ah5Chw0N5Cp4UqTSe/Gn6/dSdakzS3OFBo3eVM4N4LVhXi9Yc3Lztge9dNjEHgvcxW5UPhxZVTPyHXjr

77sdrufM8yJZIpmHozGRzk2hxO1tscYU6xP78DfS0k4hPmmtEtRd5ePkY5rENWw16xkSWu2FKocNrQsu

ii0WQ14exwJynYv2XiXSyv3LACTvERoFIFpxa/cRDE
6oP2s8cYKcGrwDHPjoWEM3OfQR5yMNanKAIXmLCP98266G7lNGPa4Tv95Ty6ljrCUrew1BDfI3v4V1Ne

l1KN84/bh9qAlZ0u18u507yfWQJ5ITAYEJklydd3FNEhk9nQ1vzZX3R1G74PREwkSHN35KBAEYes+60L

L3u1kDBoBjGiNHkHxZIpw8VdNX+R1o0t6vS5yPW3Qr
NUV2D8/1MfltNPTDZO6kHxnPbJzqMHZbs9IjtYUXL0gnMt3ifbX3Hpzo2rFzUkzYRVMFKHLgtxW+6zdJ

txdTYZKVehI1fbTtlVaT1CRujV2uDxlkJkJ1YJG/vCndD63NIx746KKNxho8xf9X6RVIuC4IK+TXvULS

dut3Tr5Vxcyeo7o3XjNWJCpEBGh0EoPcV4GVuPT8cC
zrYOfyuwiv3+9y9NvfjsQtyRdYMU05b2/PtwUmfwF3Q8A0skTg+4wQYXTK76P+U2137gTTDkhxs/xcSL

dAG4QukA+BMDIR6AGxaCb/3HmNy1Sk/KILO+klgS0iTgBBbVr/3Vf1nYDDzo1DpWmIVL+m+liCOAs9Rr+s

u1HZZggs3JNbcn+N37Ct16XUQUxJFAqaOTNanEz37f
E4XgzT8eV3uN4jm7T3BLqVl0K3jxIdIfq5qd4GeMdRQMvFfvx2koW1mbfAkBGBM1W14H4ZLxMuCKAm8q

DHqQCbK+MoKlsGOw0/dWD+niFs8FyN/hgMuUoytj2AWQKgkRttBHn1P1kgNnMG6CrMeuGKUPsQFQNbAs

bqx5A2YRrNT7PFlnNy2cpSy7X4BRc56C2ETkZAWICp
CdHcsEdmHU/gfsVs/E8Ul+m48tSZjqZkvqG9iLy+nwYx+mn4IqWu/eSFCwMqMiq4G+SAS+ujoTvhvlYV

kdiG2xamSr2MwpPR9z/5JJPK4jYvNxV1NVeUUsAVd1pKIhoSfzm8yKO4flyO6wpsez6FkqhF86Bge9WV

clW4FVhIpUh8VYKNDWlgTi2rpef0pUuRY0oi9rkZ+A
sXsgIFD484tVcqpGa7YmIyDSebxEIT+6VvpYUEb2Pr20k40g18ouc0MukfXupUUDh/O1We343e0muNaS

pWCLdukQTUY+gyrXDXuKPUh9MIg9X9DVQBwIKTmd+fAxw6f8rzqJMr3mg43hDpveSjQ1nxLNdiycdCO1

hCe/x28o2TDulwQHw/UzmC9krJXntSXTiHs0eAw23Z
oqKib4FuvCLfT5Ue8ngzbuBZUIJM0/pHmOkRzgN+PY8fUAlvNJuPPPXzHfurBGIm5+q8Y2x81OqTynwt

P/PkUDqnG5l87osV5kKLSgXSSHj/ZNwjCT5VG++pPkdU26/3MqL95yuRCKKfRf0jDEt6+hQnPghCVKDd

qSCVrmdlCE833OzBbWPOwQ1Md18xV221kmwooKsBYL
TA9+x9OU5kZmzsEmx7iJz4Y4LFoHK8MWx46nx0wCyid2Smn6+IvF7IV0G1ampd0dIvxojpEVZJEGct8O

QGDCPhFVGWEPExWHcRQe6T9fBxht/LnQWUSElbbu/b8k7c1FJX4V+KsbCkpzkdQTiRNwUSYSCShA4dPn

9g20WXbrXFOH7QZ0T3m/UqoX6oB1f5H7pfeQRjPKs3
p4+G8nOHAyGE3wv2SGpEkFjtIm0n/SPIrNN1e1BC4DE+xkPADxaBX5oVFalZpW9QKFj7QzRbJzAy+gRh

ZgBNR4SdBqq/IQcpLOInlYFzq7ang/ZPr7TT2S72AEF5RBMZBZHQSkZk2jUUkBKWIs551iXTn6skYeUU

Q2yijcZuIbQ++Cg23WfhGVQcBAJF8l0bHkFbCXCYOp
6VmGCu/XqR98IwdXL95/VpX+bVDLE3qb0J4DA+zWWXjc4fysdGoG04nuowsxPNNSkjQW4o2mZ0++rT8N

Cr6TiHTLYBwYIBW/DvHeHjlfcqvQMaMarrx80q37PsnDbvGceTJEuVlLw/L3MCou8Z0uRaTgidCRoOcg

c+SsV56kacMazfl+hIRjEZouwcPVhXWCIN60l4p1B6
3P1Ug50S7yiTlT72pdFk+FbeDqa//5Q3Gy/A5yV+50CQIKFAWEtwocy9jRxn2kuPsJJDPgCx4mr9RRbk

ZpIl2NfPD1/SpBwMuAfmPjM9Ifc11P7HivFpVL+Ipk5JL7YtBqgpdN2PdAFEXkfHaCkmPEGScNa/ZPRy

2iKkxN2R9v9PGBfdyf+QNA8/QMeCNc+boJtOlldFTM
uLwPaO4CGdhiFT4I5gYK3E6VMMW4Qgfbn1GLAku9qFfBrtMArHb4lZhXBpiA+TKhY9OQV/sypCys2UgU

ibL33zflVSaZaW31GYSJa9c4D/GyrQrrWg7dBxphy5g6GmWA24L2YbBwSoSJNjrCEvEj2PqnsJTyPro+

1v4fhg8kcGf0qsFSAnkATDC8vEA7jfjhZEjGh29bQE
TWT0VvwuU2E6qt13t1GzcH5lxMvOGFL4qe9vS7wM5EaHZsl+lM8kq+HIQlLZzy350LMlqS1LLuu7qZ5W

zxsc/0au0IsLzB9muEG8uzcpBDYhc3ciLxDvea5SG8l4tnQXhIp3+lNAsuNCVjWf7MUUcFj3sW0f/Nxi

hxJXyjTzfWmZLM6YM8YFHQKY3rtqyBlwOQt3ZfYv96
CxpQLPTHLZCY8uqEv8oOEk+/l9mpil15h58U3Oo/gqik7pHHJsypGmj36QITYep0iolWf7ekI/diKB9B

PlCQP/FEv1LCBX+zCO29fXwBe6Xb5TUtN8jjSCM+RbnMWmpuycWNRVFf9L8c6+f/nDO5RHBiWILQQsg0

bLCfuoAW5fWipb3222qIYLVY50C77wo43QRPEJI25d
7NT3nbzC3+esY92vZGCfz3+iLYpMTe5aPZjUcqJBztCqWW1TtzR7p+vEZu7Bjp0xX3HB+MdpnnCHDLA4

ESWEm8uR2uOoN9X24/DRad5MbpS2tvfrHyPkQDCzT4UglJYkeVP9LhQtkNd8XnQ+5G0KrYUNRWTMbEmf

w/qxog0Fy1pF/3ndDtIkgRKLAX1G52QsYU5HuoWT+V
Syl9BQ1C3CmeVxkFGZV8msNucYp+1ZTAefqlb/EiwyPnFY0F1545R0Dvc1QwREtRbRhoMeF+CaqRElDQ

npX962dsNbLKpKAK3B3kStqJNe+nKm5t53j0wv3zEt4oqopnVHXvmD+NFp/oEvn+sxP5Y0o4MIrl3ddd

1AEbI8vquXvjGoPkkM64lSYrx9D4KOJSzFtvpmwZ+w
12PHbAJKNf3SP9FK2EAbzvULtbPxDE/u88mREStm4Kv+wu2vLXl2CdR6p1PdgWiT3Q7s0aBzaBNoraJb

C5EhUaA73lJUWZ9IzAflhgKC0gseL2LZziubeHJr6NSlt30VhLfjo6qH+U/UAY/RQz2q096dxh/ZKrbR

cPIz4dud3DRIhDXt9acGjPqrdB9TMqdtFbEtxaN6yV
Hl27FvmknTD1fGROoi4EH3+uV0tSv6n7p3XS3T8dIuQZOq0srp3UbqfwB/Gj9vBVS1Y8tBpMW82kdIeq

bYqjnaQoNtfNUC5OKXBuK7laRQWSh9ro/QQYpARR9IoJjS6dQnb5Db7H//5+29gvHVedFL0RR9ud4Qcb

oMMf6uoFWFIlxg/T4dq7fYAxCuCIWo3H7e78DhH7xx
jd9GmDJqPbZFvXbGmNn0MvxUzJbAoeaaPmJcgLNiS/zWEmdeLNFA7u8OenImi4tuqdB22pNnhlRJrHU9

MXGZrlk+aPoSmS5cSwDcEOI7tGW6N/91KrtizbiOL3s9jCnvjcVFNVS+3r4BW1qn4p+nu6WxEAVvhzdS

GCjtlCCkvxpHhHgbtCg2glmC+Jy3zPIAgod761uoAb
6sYDgPbg+bwz5nquwIPHIM92eU4kH4mihkRr8WYWP3N5sPSgADWmHQTCtd24o3Cey9VRxqBC/Upwj35F

zTbzEzGqjnCImSfX50bXTywbYZGqUIjg/n0LJlkXdBA55r5YrRaCWc5s5mfb0XyQwQc548SO2ohzGHU4

v4B5FXAlkwQH1tB0Fg9TSN/csgqoG585iwXawGC0Dd
oe1m0pTi41lYtxmIUU3dkj8ARBFgnhLwgB8Wpeq0gbg4uu9u7TuWIHq9UZ6K3jafh7yhkQyLD3wSufK5

NnThC9sSpkRzyn+d8kSVZKYbpm50pC6GVuT4gd30GIYTt+DoHV7Rka1dheM4IQO+mRoSAo9Cla3K8evT

GJOns9mbVk+eFoJz/HJnTM1whaFR0KIuU3NmU5ck5y
qMBUgObr2nkrqUHbcz7aQEDPSVzz+mXiZ9gj0R6E0FBn6wqYRHvL02Av1/ZfWVbcy95E4Bdv79PFDHrF

pla3lefCkQkquKEMBCCZAl73KxPYmvzYA4KSIHr9/VKiu1+sIAfv08gCc5T8VEmzwsi8xmLUeUt7h5LI

zYabPac0ef31IN4j0Xqlljgmof2vg41FYR7KbXZIlm
co7QuZYjHIyAH4YURlnaOF/A3L5GLXQSPI/rRNxr9uhmClcQjgIZC+3wvfm2Gn22ujObRYEwTSa25EYS

oRWRuZ7W8faclXRBv3snXw1yNZx83KeJ/Fx80tRhxUO9y5+Mk3YsPY7MMcrxmEpZP5H7NiQC3LXM48Sa

ntbOotQZAaoehooR8ABBfEfx2IWIsZDDKekYLQeuVO
SH/+vkPzF5X2+Oztwq6ILkdO4Ja0nE6OUl/6SG/ISnghKyUK4Q7CAasil0rLCfWaVa+ViwmczggwoQGc

GbI+KRPFCNHM7W2jobVObF5vpqgPrClr2uOLyBlP4tRTSlboYxYq2YlRXSV5rV+K6VzUys46Ghc2AK57

uW6I4QJmmmK/rNBklzFZjATduoyQHjws9wFMA6ZoKf
mxN2SHoiD3fjs+rTXECyR4d136Gh9hKLDs3lOcC3jvpIbdVvQh3Y2nmCk4sspbMBYwovb3lbeq3H1ZBc

TptI9OCSGkxYNgC7I42GMgHtHsLxb7SYv1skhJ1k/xCWdVqsnvURE98uwQfgc58E+2zcE4Z0K9VHKUZb

cYklBRqyPwKN8arz4DyXvhURf+3V/r3RjkL+QGqH7w
qESxqif+iTAwNAAZdMJXUe0JuI7KpX6xhhQ8WZuaIIYfGNIDhq4Yb2xfx9Yj2tRlSEylac92tjEgalXp

F3FBup6CSwOde26mFHRqf+Y08i9Elk6NiSldM6QxyEuY4RzsQjFQskn5riJg3XTXkebFoZgp+XRHjiIZ

63u94XAPqmQrO5Ctl53VnhuwXG6nLj83qBzd/Qpn2P
IDNowUozPg7QY/9qKjRmav/nkupF39g2bpxXcmVAU1hi2GvjouLFmdMKFaQHig8QE9mr+FqYv7QJuxH6

kXNEzDlcExd40vO4Erfp3XsxZ/REpD7sVscZe69P/qsm3TsfnZBO1Ndm3uSjvguegsSKjDg42FsSXBfD

LzUZFDzCnvIQuuVgZft7+oasePhnVKAcADyLM5GC3E
oA7aQhiobM76QFZ0rSWaQc+XW7NEA0595qPZQ8EGVeqvY0K7IPVIXcYU2BbzHMtVwxitizWORNcpIjYQ

TvBHAywVmTkoUIhbxjs0axPEVO6RZgYv9nxUNru9v+qlqWumAhThE8+4mfgFm1I13xhvIFsg1B2DnU2u

Dkv2PXOYRjSpWiEoNGYPuOnn/WzKzWpVllKdAhsmKW
y4kU2oDG5vcks7B+28FJH+68DRlg/uHecWAd0LGBpFJ1dsTvunDeLl2MRB1qdZfCyiGU49ZvXfVP+f6g

2/oKOPB+d0fFxQ2snYbB9ks8ubq10TzK/xmoM+Mua0jDDHsD7qGa/4J9z7/ufTgxKsNL1CuCnnf29sv8

kFwcppeYEI5pa17gY8YrEo0ifxAD7S1NGwuK6wVKuF
DS40q6boKQYzoRS3AvnusaahuftiM0wFT+fZqcNg2BPoNxXjTA6amPaHYs3N9GmbxadxJzg2DTrnYWRY

qplYzp29uFj4XmnLatgEHXsbjtwDiheVv2ppF4FrUCvoUqkB7f246/83f6g7RRrlm6Ef1G0T+Vb5AnIu

zgRlK/yFU9lLOUYgTYXTs0251GQYjiFL93YZGHatFv
IUGIyz5E0SRiPkUnS0e8kOwBWLLdSuVAqnYOdV5kV23lj/CQLaotjjgGtTsogJwIR0BQAjwdFEt3T0Am

CrPCepDSxZRUjjdaRP++L3dNH9E3RPPC/OFvLEjLqqAWd66B85G6vPm8Gi1KgUuOHVn0b+UouhdNpzTI

PqA8EnjzmiuhLj4QYv7foNY3BiKd3rCvSQa5dr5GXu
CCdQRKtmljU5/PdWZabyxvexbx2AyjKK1jWAAL9Aur6PuKLLBNtTM2UlmAdXvXicL7+k+FW9HYa7ST+i

04xGt2NLEcWmbCu3+Ggyuj99xffCmb9Z1mqsEVDbVozZMQ/+jvNAB6i3/K9wGf8DiZ91T2NGaFMnJNro

Mz0BQ4A5SZCQYnfqjveA2DOip6Zx64iQYZ6ULx1dhq
odDXeWMxxQ+KpsZUB48X+wd0NpgPAGMx0H/o9Ar1Kff0rjpN5StYjPWpwUwCLjr0z/A84fGMqdvj7Yln

+NXrO+D7PY7TIMhtYB7n0IhEurnoW+fw8knhZuOMvCCPMYGtNLvncHtaaaxDHAQ/x5g2770ngyIUYzho

G5yDqrn389SI4Lp0cFYhEBJUNaJqrzJn0Up4rV4Zqm
bn9S0o2F3drImAxWnaaePuLzAk8NhX2HWQ78mKtA0JYTHN3MEIitJjPJXUkQurUu77GLOG7uWCNZtOXJ

1QIZ65jFlBqbgS83JQtm0QDIHZGrG5auRFR9GKuDST6BSmCxzewyiBqD8Y2dIPx/XN2TppgPh3lHdEGk

p7/fCUyJuUw9i3VGSzhOM8qNt7HTSG9t/MgiKm55mV
zf+Nn/+MsfFGToCjwQoBGXoTV70BNIVGsB7Oe+3jEMmnuyP+qylEA+AnZt2txHv2mlqsWugsLnMBuyXJ

by/nLga/Z3whEex9c9PU+yBkgQYut4sVSXVCbHLHIwDjDZFW0JWgYVv6YTx5cKHt7j+6uzbRZEU3r772

vLav71WPNACZu8Xk5A1HBGH/dmqNEA+T7QcCgQUCxJ
tpDaoNFElLEFQX/wxpUIjEuSwGPT6IrIVlQ0RWLT606sTJoi4bZsRIIYX+EBZiIxMvUVYDxRBLTh++vI

4+PIEegX9foc+9tnHxISFNG444S1SeAVxdRAc8bCwwWkLDgbCd1fxxVCdv+2AhTshe1JXwK4+0jJnYfQ

99OJXj+TiVaSMQgnGWtX9gLIWnqyrJ/Yt6A9b5M2Nz
ntSS3bt7grbfbvGQD9gmuiz4UYEFqzRIxp4dz0R18v0cAsVq1CV+9HNfUBNBJSirh/borbPBqv0M63Ji

u1HQvoRxWWY+wqVf6FbBnrA6029qhTesy2PdsI7ZC5EZ4BHy8d4msRPP2//nZxRBdmXULHjcKVBXl2h7

dFpTrtnVWRtAoctBzf68uukpJnlsOlMxVe5U5ppQ/D
jjqvV9jPY8xhM6wQws+KzLLGUEzkjg7ziNsOchrEkGKYiHmOy/9eXw1ha3mi980LfAKEdpuYpCmA5+/Y

PY7hGbDujv8Src+YdtuR4hoa2WO8LvHeQX9AR6IMgv0nfVTdYQkk1M4RcE0Kt4n0oS8u7Y/DN1c0exHV

qWK+9XChVOkTkeW1nNYmFHJBqsFo/3trDCQ3KLJ86N
vr3fQQO0s8kh6XWAWFoNA+137+64nOV5bC4KnXqd2fU+5yRIaxyOEMS/pHiV3nNe+M+hdZkvDMsUDsBX

ED8UtWL+gDFFKxLIsMJl6iSTpnny7dwnpfyGwCvRfrvLRfQfK+/pZGR76J7/waMZXgOJDsLtrr9qDL+M

Amo5fYHX9+GH9+OihfbVOKEk1fA2rcEl8L/qDtA9VQ
oBE1Yyi48hSD+VfKFoEYePOjzOhqNwI1PyTfIxQOxRQorxik/vYidEjz5UxFhDAN6tzQ0ZI6evZoNyl1

PIcmFFwJfB3n0IYfVMgGuJXrNLqP0VAS5Nx6JPiERduhZltNvJznW486PTKODsPFLF2QeVnvepOQ7L0+

1SnBz5bx/JNwGgMW2TYP5wo6FH1ZsCWMkDez4dw6rS
pgfvcE1jMpBTxLre0i11XPEhugQmunSFPi3vjKjC6eZ/8rd/8Ojq8FlWUchwysrieImGRYHxQk7vUCDN

AoLXNDoGZSjOZfPNVXfzJvUhJvcig9vY4BGcU85kPL+6C8p6mTxDANZAh6Fo8JKdlf7y3ghC0paEv++e

s1kWV+BBzTWPPO6Z5JvhnFeqUomdBulO5Cav+7KBaB
/GwefmLpagi6sHe/jmAow6rD0Eg5RrSGbtw0rSXa0ga899c0zHB66MFYRItLsdFYvreHWhJh1wAQIQ3q

htzCEzPmHIw0EUKmlvJJ3jP+Q6Pc0+bfO9+Id6Urb66mLdsp33Cg5QFZQ7y3oMRPJjXsQTMSFRJYrXFj

+NIq5la+9Z3tT3OkEeQmhCyFO9e2NSB25yX+gRb+qA
VJZlpNbOsUoKXWoFx6Yu6MWJMlmpvWSib13nC2DJ6uEBJx3S+BujKlR3YCBZc6ZQOVgzUR0ByJvVH1DP

jlRch8HY0plk1nLSlHWpNCu6VDyQuFURFDaBZUqb/+WzmRUq2FxwQoJAJV6TCZNb5kt32rIYW0hhzRDA

5Z4ET/cFe4sFMt87GvXJc8O5m9o72+F+c5/abiCE4h
YT3Zx6aZaEvvNjunDzfAZd8Q1OLPIJWISzg95YfgpuSsJQFyDfdrznuvinPF95ZGWR1xlUNDw7tx2gFU

w8LAOOl3dx1wM5xdrf29HlH6p8v/XA5KZZzcVAIWFNr40X8ZzuCQMTk6TH3IbO2JqomqNz9jRi3A4w3K

vqZgjslHLx2s0YPlh0mSvyYdHQjKTIgT3AFk3B3b3l
dUiSYNsv6Dnv+wvaob6qB/uLN+Xyg48i7TJ1p5V/h9Ai8C9I1U8Qr3WIzBKl/tVVwOjb65O3iiIHFbzt

cvm09aip1kEr/VWZBUxw//2IHCGo5l2dOKzj4JHvqArJP4pOkcI/5RBFo2/GX88EgLR/oklqzX86CaOn

2bfH1Phz4yPm4FoVk0fX5CiIKvYSpMeIEBtazF5i/U
q//Th3tzUnenzRhwf7b7hgjT2Q1Z2IXHlIgj6+xFVQr2GkHTWXZ9gbkkUxPXNzwqUA7ZGbsfY2INBKk5

wPygY7Dz6Lfdj1wsoZoWcK52gHG7O2F3WwRfHLrcCwjiha/aZEVnvCwAGa2ZezYMlcbgk7uuW2ZQ9XPk

N0on6dCsCe9zC/qrtVUm8S1G1ihScO6KC663LDSixJ
4f1gbWo/y5QEFCCTgAl57/fulHJSg4l2WWdlP/PahTQB0wjigEfc+KymFryxh+JFPx05u4Qw1HJEI3M7

ByH/8FNRabf6qRk5C4RKGmGOCEqMygO054r9YMnW9cITotlNIbjcsuFxowzAdNUOilKgdZxZNzJYIH+/

jqaiHHAruUgaMj+gSeBeoTtY0xFQY4ERjGXIP4WLRN
BsF/raZMMdjpgwvoizpreWsXRw1NbUrXtD9Ya/DES8jJJs7xrNZBAJ6bYneeBOs8d9hH2YV4FRvoqqhi

PIWQDPJnJLLs/QEDnVx+Q5KHnvrSoV7DfGIIP8L01IQBOTNgEeWacmW2IHc7yY9Elwx5Jo6Vtcmew8nb

tkqvwjAWqba9zY9G+3uTweX3LR0F1gxszGSyQVt7EP
7Oe8hb/3dkmH2RO17GtAU4tZgunRjI/BO6Saf2RZlA4O2VRXH/tsBfppW2ty/xfRUrnv5uvgUqfUQfML

ki8ESW12FKXaTIrETE1heX+jGbvjQylBdA4ghl4YmhGyv2f5HBPkFTpHRMNPxNFHYW7vrM7BgWEwolFi

nvoKlf7S7gdpEbNuQV7IMp6eluxf67nCnHVtrZvNnz
svKYpfdIPc9A65cnpNgKFdcdfabL3aamupU70AfF7v/nAaNL9e+FXkAprKfZhuQlUdIO9U/vAom9/cUp

Czd/+82FMHjoexAjoPp0CQyzgpysvNJdIVfBuI3lSOYHvojfyTimktZlZ1lRF7IVm7KhsuINwLvaJXYW

mZc2WyfiDRWdO0u71jG1ddXOrPOjLkWIGrI9TJXJdr
08JHki4wkHU3PrjF4RmHamIjC0bRiqOW2dYIkAcvI3ykc79cjvc1y0eLeMiGSKnAIflCrEnYgsbQ3Mc4

zUDUUHRmcCXOCjR4RO8I4Pwxs0vkebGy5ilPvTdjXzaiJ1jFrunNVwBpT97PCyd25xZxqeYPZ5TnDK7o

/uZPMTGinh9ugSSeyhGgp5iXO1NZXV0AcTxA+OMqiO
ycTcrKGqv5726sKM6v1xg2cHTzCD2AHK5Dwqt8Y4O0rbdMmDuzy1kZogtlu9YzuDqj1O1AYUsUPKFLOu

/IGbgD8LqsQfa5V7OhUEG8MuzkhO/QBO+RLCVIWVhVocn7evesm3zgDUKDhrVzm/Qs47Ohy2ChMPUEFA

/xp38/R09+JEFRv409HzZRT7BzcQptlxBstocohrl3
fTBTJCJwXKMCHXN6oDGtyRkxyDgL09WFieNdIf1ZFdoyaaSA1iCbSCLv4INbP8yDMZmj4URj3NL7xzqo

9C2C4NIT77v3NmCR5mKVjWqv1gifssjfQfrm09B/ckMY2637JTkx9/iOFB/69rZX1hVylcrbqY1iCNAQ

F+zazsnSerwIUejgeXk0mJ7zbBm3B8eZnGKK+VSV4K
+SqKCsH55fu3k4O57DrwKN1bNaEv/+G4RS2aaMefAVmD4Ld07z85p9U2XONs+zY2AswzuPC1/J9XfhRw

rurzvYPljaB1459ZY0tFVIE9GBwph5aC5OfhCpHsItzT8sn/24uHH8aABxU76DBJ/gVk4A1qKpA8OXkL

EFaXSW4ZZoPEH51ECNp8biGRFg2VAne8JJ/DkZYiLB
tQ2ZXax8MaOTs4pe4sp/I5az767uCe8liErlESQl1YW4PycmUUbwkYt/D/SrdcB4kF/yfwrl/96FCx1A

3/QL9o2w5y5P0+OYSRIm2sPXIfVJiqc6eHnhU+VQQ3cGtf3c4P789Zo31Cvc9Okc0NPvXJcLA8o2LkaP

HSsWOMkydKFcyly3K9yl38St2Xv2vWAVHHljKhM8nv
COuJYQhWvm2NySN8KDUsZpN9kB5/uI6tRYmCeK1TdJDOcd71ZmUz77yMRFuanqhW9Nzr4QkK/BsUZs3x

SFK3LrPEm12dmdtK7763I8xPFUtMQf6i84z/Zj4ArL14y4DcnibGczF5Mb98GDe/gb0yXSuSfgsptyLk

UPr58Wec99etbu3EylpHHg+wO29IuHSIub6SGJdgJc
JuB9vIZjq3LKhdRMMc5b/1UwNRLOa/JvZWs73QRYICFhl3dHf3JhygtLP3Lba811QwrFMx126Qxz3NwT

oJKuEeY5eBaUXuiP8yemwtTixl+J/gj7ikAcVjTr7duq4CvnIMq+AxuhOKFLQfVWCn4C/7j4ISxN08IK

t75Z44KplDD6IC+eqgZIH34bC4njvixRMjGyEttdJh
DAknZpcYDzdti896d11aF8d9M8LhBA6UQ0vGBDcunbojQOd/1v3/8lns/59ezxCt4w3u9Ie3xsThivMS

SpjGKOa4y6FsgIuZQPnwRxKsYHuy1IVTVlH7/ujzaoMmBFsSQwUn5+i7BYNVz4T+eulNg9grksl2nYtu

jaBb4r9C7YVBsFhvQzavt3rM/N4jzZmkqBrfyk6tUS
tudWq54n+gNzGgUMO2Gi0ulM81gMW2ygkOX/mE+jmXl4Vx796mRjP9J/SkNF58rHalyvPM0pNFG8y7Ni

pVmSlxcFBjlu7CnMITDlx81InlQBCeeKOSc/dZG/S1ogafRSNQgKe85wHqQWiXahEbb52Tcltsmax7V4

DFKBUJlAChs9hwEe5ALZxOZfY+ZXR1V7wiE7T22md8
IS8XHn/mxwn5Wc0ZdBJwjr+pE46fEyNGJK6rBnd1Sf33w2jZQE9ViMBQZGbSubH3YAa+wAV8wuYcQsvZ

6sE0Ol+6cqXwPWkCAs+hIWyOQVSzxwE+QaG4n83dod3wRGPsoxwVJ+V7csvBbQiS2NT4Ern8dBThhqTp

qMIIOoJpZcSULkvBLopFq/MkLAivYiFpc2xf+U6v15
SpXQK/DaIgOuAnDpgmMAKCJHtB5COpi6mQqGXscG2hEgVVt4FLDei+upjATvmXheuUxlz7RdzcMsopin

zYOo81KXNG13ybM+QxL2ptsyIOvQhuyV+j2ldmcf1KLUcQYkIlmF7cDefTVFGn3Jdp9NjgSXzN7vMGf4

cBq7vyenAEcsPHo//HBYR7ufDLAu+Y76lhwElkDyqS
eF3kPBwQGs1fbXg765pPH8/+hSahozRTzV/NgAY60MgFJqhkwThOqsjMqflHfsvO6GLRmRmwRZbXbtS0

6m+PwNBTO7zEwIYqlvGb1xEhz3kGvfLuNZo2CpvDWbvQHbwxTbsF75IsFMVEN/0fPsOEuMcTxWJjdJYG

s/AQrZoXCxZ2a8w0V5/f2sptK84xUXkIkLgBHsJB6D
ENvSOBtfl2wWvSTVBR8Sr0IMRurK9P8II84fe83yW2hGJToy35lDkfqazA4INQktYLHjEKyVnEaOCMiY

DonoK0TdJdVZ+mgeCpvviCU/65f8m+QflDoryS0+6SS2/jPCMMTEHF7KR+8Em/1dqNr0dh+U0Naa/C3h

PQRC/p6/BjS/GhkXVSS8c9MxxBtaHa4hJkLG6BOtEP
yL+/TkHATMv7l9RsZ+M0PYqBx+g+Ow4XyfRy12gB4oaGtKcHMqGniiV9iE5Eq3Wh6G6Ff/8pn4JYh2b8

+vuuvxGi+bmZPzW1Ez+EKromP5/ggSLNduFeMlkdSkuOg3WU7SLkZ3Ir/QZCowxKSVgU3lQHriNmRSgf

+gjlbmrSNwIt1EagyGkFk1Curlb3ZW2k4Fo052ciWn
0OeQPIQ6CFPnh5TKBSsEizNWyw5SnRcVyU3T5pdJOV/8S1mfDAPB0yNj06Z0t97+hLDyDfWorh72yLXc

7vI7Wrv/WveCS+2ATNG5GVzyFYy85XEMw4k0mKTT+DRb6pXFCUNekcWrOqdn1M8OaAF/ZdPMxLmzClm2

Jo3YRCBPKsWoA0zXhgTSs8TyINe98Lzi0B5PR7Tz7/
/o/SL6GQdEP9X1oLNvcAinrmijNuZWA6Frn5iAI1JW0KX1wtBfUfKj8x1NUnWeK2dRdiDc2nwQb61pUY

QRUdUjwQGQIb0NcHCLHETC1QMH5/gzQ2E5GKPoI2W5QvTAKZ+s57eEPrxg2jS89A4fnSeMgnYbHDz5Wb

PHN8+9c6JDBjnNadO0ckUSQyTv8IEn+EP+90PBwYeH
UQ+FVq3uCstWIkEEYzgEMm8gsWKl6YE/p0GQAIc8Gk1lJ10PE4Vz+cewNzV4gkqaGVK2IZua5tC2PA7N

IIQewVOyjDr7lxkkiGCv5ublUwL3Su/SE3ivuXojlE2sEg/tiDAXGN2ESif+ZFEu+yIoJUZyi6Ix3t8y

z7pZ+Pr1dpwzxaV0G3nNwIgEk1sO+ovpLmUfPF6le6
PnOzHS84qo+c0fl3jvtd6t5/h5wSezsKf6vr70zIoRqBJqM5bsxPY2amV4yfHfOLR2j2b/rpbYG3VA0v

1+6PhBn22G+BQ4own8e5aM36WnCrJpjWOrX1MHYNL/X3fMVttQEymn/ko6jk20jr2j+dPvWcv2J263So

aRJyduJgDUcgZkmj8T5VJ9P61qcR4/LExQgrvN1X7C
gq9Oj7Budj97DZF0Yyryx4WdSKlFcoV5soirfAv6a+jh2v3b1J/7POImMBQQ1kgn/M9JIHPlZsOefJ77

PWpF8L6i/6O58Otf1Z058cHsgOBU9E76shI6M9WOVqJ9WfpNm+dViusO6VqHyRMvWSn7VBERGobK1zE4

SCvOca6OMJF7narIFjBNPtJ6OPXjz1ezo+Woq+cUmB
2GpcTJhiaMvdKJWx7Wr6JlIapkqxFEQv7OA1qR3UOHOc7Idben69Sa7dVGrmyVDr/eyxvp5a8DOoMzxs

YOhzH+VDL8L6GjqvRcyQSAp4Yp8B9s+AnF5NpJ6+tpTOT0p+GfoDX2ZUJdwg1cj+Qbqr2ojgRBHPnGBX

LCDBwaoxJj+ELX572+A+UV0gXQfymngYKl0iFxb1xn
RKW6St15S1w+OGj4taDbwtcf4H+6fTQAA+1ije3fIztUuGrigyrjgg4cN9PVfBbeVurlujHFc7puhBed

FeIVOBKCPJnDqxJY3tutXia7N/rwXTGM7WSBfDaly3VzqXL8qiC24jI+tYtTTjGYSIzQ5ieTr6edkBRq

QMEDOp+IjpAWoupcH/mNM7b+2zkxn0F+gLrCmGZRAv
W1p0GuEYuQhTFOpJ5i4siZD4+ZQpPvEsk+CN+tLuOZ2PpYjLVHNeMjaJ9oVHqIe+SK5ZFPVLdrkQLh9N

RqWM6j+v8apwSojOv1jYxT1pLhkxV/c+jK/J9Cd38BzpCoDlldMB+PW6UwXkKKpWoi3O7j+f2tix3Zt2

i3lVIigeidN4PEpOINcBuxvzpYYg7dTY/+5U9SY1fo
qpE5ftJCzpJptB9ktjN43GNVOS/NV4ktAYYA266DwgGPiv32R7GXcatRxzedl9idP9OONIlrexyUJ+B2

0VcGgOQ2lfCWpgVs2NSeBOhyCkxtPNf7hyRX+PUxt0XyzORrEuAqdCkSrldae/Mb0HXSJCAlIUAYXAEd

k9CaKjDzspqRv8L8TtE9WkMiYMXvlmkj/yHnXXsbHI
LpxiFTZsOqu337aisTtbuaG39Md597qx1C+SQzFfS+sLqaNarjRf8cvujKTJk2fFFWJW3OhO+9XoZcrO

roAfiFuDJwb19g8TpONSvoh3lgYlUdQFZ/1JccBmTV9rskwBp5WghzyFOuNLbC4QEkqF9DLd/9NvD25p

5xR7NamB23FQTc4TpT1zMM7kIObZSGU22x8yR8tjuh
4skQ2Q89jiRf5x0JQQ/nuyZiXsqnhG1hbxK2QJat8ISbQ4ap6OyGvWdH4L+1VYAB/MxF+XwtCYOuopuv

BT23gK6K1YLazKxmXt+2yNsF3tznKU2/rmWbNOvJU90UHhtAbu3+59CQ/LHFEkFULWhaycP/DR8uiYa0

cyok+n3KmS42CYcP+8z9DY9n1feWaJUTLBb7jdsxvS
+zb6ERbgK/4AKgzCHRxTW/2qHuPPZzL7R2TsFmfzWhQHVqcAGx4n8uCjz36V4EDF1bEITC9WXPa

BmoqOcpw0YAGtA2kERyWfTdB8x+LmPtaTTjQcP2XBHdvSxhQfwh5jSwteK9aqhGUSl6ATyKswC/zJPbA

9wW6k805Vxg6fCasABXivS+t6XYKxGUAfQqCVI0PqP
/HoZxIgRWYaDomUBhZVEXhYMwBCPezQ6fuHV1evOljfTx3PqDWeRBtvlEqEEvAvODR8stkyIzy9DPK1y

Zx9NERmMTrgjejQQJGnn/QN5P7cnmM43PPvHD1Nmv8a6PfHIxxsxXtEEfIez/yuv51RkJF7smzoY0xYS

AVZHoV9fiEU9NansJKd3Iix4eHOn0/JExc7chUz840
YcynwPy28LngJoCxiORc0MDS7PfvCkGj9HkDKB7VVJytMVwx9KkVq5ZRxSfFH9G1U5fM6tqR7Z/YWcpp

pvC2JdwOw56pU4rEr8w4zUqiWLqCHpZkT4i9nZ5lXPEjcLQ0w5WCJehXzwP4qI6aoiDaYC/0rI65rUIU

SA3uqZWBoAo0ljhdhFYOyZOx4ynCTGh7WauTgqYS8/
nW8KMZdx0WcQY0dkmoUGcxZW22S2IUVG2mzneR5Z/fiyOU/mju85/BRoaZMxdnYlQWoKOyVdF72VdaZZ

af4/Whr1jqG7BHp1G5fMPJIewCDE2Rt2mg6EsuDdAH0y4eeJVvVWKKv0oWpEVuKQWH0Z9PB/MW+vXumk

8Pb0mwrNTzBagwu8FVJlj0K2F3xkbB7UaIXklUQe3K
pmeU/YzrXXTnCdMf/TX2RvXoj9uKLblo/Fjdmxhet9KXYL/wXGYjgChfGSBb5XJ2a88ygekex8ZZ4p8Y

Ak1eGeHPaC9Xr67V9fUFjMQSm7L+d9VQvtIqHQUaNApgi/qZhX0ZDF4yKJKj6Ify8FrGVf0avQ09iz5m

dROfT2wcY6785wc0+JyU7x3hHqfnV4Qn4stPeGZ2Eu
GOzAoxz7vk2Cu7X7oKzIRRkp/Cycv/1kWTnpOUW5hpCV6CskKCZ+UGrA3Wk681wEuwyw3wiu5TAFkuEV

Vb4VJPeD300bVbDc7BUzVQgpmiGkspnGOq50Hd/9RPSsTPje/NeODm5SlcbmL6PEH5CKPRXTE14X2ou2

g+0pw+7r7pcvU4lqkgETzgsIbVrB3RNVmgmJ7mRuy/
j4pbDwhDRX/sg3q5hXBwxPBXgKVxti37rmAac02x/XYJEdrNAW8CgTcFYiZp+Sn3AF66QkQd/1dM999F

no+/FIkveNshIHAaou568Y8riq9wDKaYEa7xRU2MMSnBnt2EWcADyCe/AGBcg1BHHACqnvVg2LI6YHCf

m0vs8QZ5tMcCIeuSJ0lOpCy6PotjoJ8pa42HlDBjpZ
OX1EUI3MkT/UEa5FU7n8/Ukr509NPawhYq7Do6dbddRcnCbkkpAifRlLPc+/81XBj+J8G5roKpebWmjV

AAIPSSIMBUhK+ybsMTxcukZeokxFGkk0BPdgB+OImlp+guQk/lcnrfMRTzVn2wnuNpKFpAjSRLEhq/fp

/dpVmGgTCTifbun/Tdddu30mAptBIA4gtFu+2jCitr
epYpFyJPUJQ3Vc/UTAGiyJpqqp+WvCBfrgTkBZsGEJkStsNqjL2lBeWiCVnSmgUe6VCRXC0YSefrqGxe

Dg8DnurNMJmyBy76hR1Ul80ys7I7Nwq/zi0GtCuVQYBpYtYTfGsFd+endiTaguhDKann9e5vSZezDzfi

IzCK55TV6EdGplGKCcjyJDSvbRKq/rmrCuNum5MgOE
zFU1QweKOLNBxlVMug9rGl9Rvq7fRyQm+oqPzcJJD57ss6m4V5q9i2ptOJYpp3RUMHwM4Z4bh4V4Hm2f

gkM1RIKroy/B3mCWnn1Utk24+YSp2WluKOQlJNk+gW3SOd1n4ENiXiNdQgKASjv/apxDWTG0hF5qzHki

ybabZk+cQO1YMGDg6iXQ9a0GguUjqzkKmHAdZTckEf
IQ1IDQJAE/8D6Klal7sLAay7iyRbzzYlDO4jOUOkKGgFjhifpDJoR0AdZ9IN2n2cJhn/Wn0AHIFGgKD3

mNhqbC2YpYngRQ/zVsfpIsZKBqfMe/rGgYvFkottxEzRCSFYPNZR3CycBHo5pF772dxidy0CvCM1DLeo

OeKs8Wh5OgVAzj/4+gx9nbHsmWfR7Kf6EHgCo9cg4w
Y/AXRLDfu2l89fWFrXQhTnBoLlApfWzqE6MRpN0QIEZzFCcGz6oyFYbnPLuPi6xhoLBPcXJmpC5ydOBI

4m//Zr4GwTNEP/4P8nxGZDrkI7RlyMqyGy583xwLtob+AZX0fvvKSPd3zrKF2moldAaS4XW/JkW9dJqY

j9Hm2R9eNzLoALtRcP3T53CcSrCzhEylG7IF/NShGz
UkK5twXuQHbtKY5qmmSHfsYm9noiMVaaq/VIpECCjfmqLCLwX5GEMdMZl77kHK9+jwY9a5dNS3K1UFnh

qobV5lROPSqVm2J02pNTtTKB5JhjhNyzD3psMYZ0Tfs8AGv+zTzuXzVV1xXY9mCpkWiaj1OmnZ6WOchV

JszlLRkP9eG2GqCZg4CbDB/ywxeRkemj4qRNmFy8cX
wWxZidxCrQNqW/2iVSZupsVR7JwbzT/kNOsVLPbpg3d3RlishyzaQklthL+ne3c/9UawMcYoZvFAS7Dj

wSn8yyjAAKdfTLPyfZb2zeaZJM5i7t+mgu0h3k+/mXNTWz/c+njS5FtiXxuPV6lSxH2YS/n7pbg2ZIhI

kQzcns1A0vSEpRJLjhpHa+pSMNegSqe1muyo7qZVJR
lwuzzgpj/Jra7uCC1ZMyjF/rqnkwMn1/YoL9TqxYUZRanuVCsqgtETJKYT4uXtkYq42ycNwMa3wZxpyM

F4LmkPt0DoHgcbCOr8Fi/b2I2rIi0l/a3GSdZeQEeA1iyjr7QrY4B1Jb3qoGYh6sn27cg+nR7XzcvkHL

QYVB/O6lXpkwzHYzTc5LnXkDa+sEFjA/6j7L2ySbJO
3RsSAridx4aDar9IYbobuJDM2otTXW5LijfdiOp/Mz20WS90oNpFD0PVa+flvbgPWhX9iuVqjoQtiD/q

3yC9LBiz9YGyiPpgeBMvJHCcxqTzKCFXYWPsnnkotEre0KuCEqvxe/yajFYRO4udwjZHkCidTvAW7w0U

NYZeigTGNP5WETN6TAOnxz8M0JqGMijAqJ1rqqS+RI
VJdlh2b14A9BicC45u3vg+fcV/d7n+/did2L8ZXa34BfQ5RvYeScXPasyWTWhTlxSHacGk5YuBIOe18e

yYJTUbJ57nHe9BNowsBW52KFN3tXPqONViXVT4kE/p3L/TEppGsBj3HTkAoMkvgixvp0lyS4dkku+O3c

wnYgWA3tw5RkIZF/juWXrBhkrdyiyA6BDXFunzpJ8u
yr5q0Xd+cjMFvJFd3tGqOIQqTNj68PJmwuH6l7R2rhdd+qehZggEZTZ+Ah8BKOyEZwn4LmIhnNa2lg+L

xnpIyWXRxu23GxWrP25Bsc+tQgwO+njw88d/P3bOZKLiGu77XaiQqZ601Bs02wMh5s/hqnerVZ2RUlHm

Katie+EooYfVhAIThZxhGZ80cxQFCG0ujfNuIhRtf+w0
scfEWYuQUi/b8grfzPEno5Qzsn/IG/ddagoNuPTOBkT0CcnuMKN2uZTEJE+9Opj6K41Ngv30UMv6Eqac

/UTvhA6H3npyOlKFrugaICKCh5h97ep6Jytr54LGY5xUnvesl9XhMc+CIDTdXt0NPeT0N+wV9MLs0ZXw

5o6ncbD4uW8BCQIb9hX3phRt3vqr04ABNWb2kya9mo
JGCfsfLEco+21p7kqT+NLtSUZb9cs/u4SCweyZ8OcpwyLrpoIHdRiHbUQMmnfh+sjvWkrl6sqKjuRxc7

EHm9tUq+PZmkk6NJ2J8ssXkfEvP/FaHnXlzO421Ap+K21yw2azLq0zEG0DkHRHd5CjLpVgXhHr7ijf5f

6hOarV5hkLnjZwA49MLBqkdhBHDS0pCfAJdfsWT+w5
3/UkNbyW5FBn5QWTieH+UJjPWZueFZ5rk2+3MI7nM2pk0KrpQwy86jSMoeh9xrULKGMlXlll23q7MbwJ

UYeHVb+TU2fDy7gDQDK2H6HXqCBZf0eM8/oJRSzuJUcOXZobmZQbXQg4gLl/XVjx0sVGAMIoKYMUeKAj

d/Da6AMhKptkNQARTqlYKTRCqahWfOaQndnMf3c/Le
F77Tnb2wG5ay3LDVQh9cDX2c6p6r/X/jeEtwouCJ9ums0wWwNSRCDeesXBvLsqHdQxr7aHx0UGxeHKz8

CvC6BHSNLqmQ7k+sfw7qb07gaZUOL+SzkMSe+9aGfqqJ/MqWmdFvYufKNobhnQ/gd0DxgO1wTIB53tBD

mASnx5XPrghSNfsBOKuEPn+2EZ2UlV32YNTD5G6P0j
/5/YSEia2Vsto6zORw630vW1qE50KAnaotQpLuAsdYKhpaWW3OP/nwVNIheeTqhL/w3nF0/2qK/rQMr9

YmtwxiDLA0goFR3kIAJ4IYC/0/zQi20ebjgCbnUTwgVq7V8HwqLoooxAABly0FDq7y3GvP68K36eUIru

8F3y1UDhZKFQgwWrKFpOhi8ACWqnoTFaiIFeyBJ+K2
wyCot2/+cmea2yhrOKcrHfK0OQa0rvd3NXD+58YjPqu7ZGn0u176QfN9zyqAqh0oGFBEgUOS3AwNt2eG

szaTS357uFLTNDe65acE7s1yq5j3G2dcK+hyp+O6CzQLGu/xveyiUV7DMnwVKxWEWpoBATix52cfvw7X

Q3lTfidfKdzmcF/DxZrPaEcFfdcmNn476DOjXS9Wdm
LaGGlZ+9Aj5T8Y8vPIHb1bVWNinnXHTgC9xwlhpp5kVOeD27uiwN0Lot2n0anLFCZ+CWkIau160peNno

MhnIaphS2FWaIPyuIQjYMCGey4gF5mfSVO1cFeVJ4gJoJnd/hnvX9QPm6IHkXidUZMUecpMUW/BLzdX6

C+fX5/W5KKTgVraljfbpcM1H2LHQG6ENdyplCNhzJT
pN4D0fuzBfC+4+ndiOXc9f/AYZojOsZyzYAVXTFNkcFQQs7K/Tk1fFFX8UD4qh5vvtNq+H8IuKDZwPV4

aEMbbeT8wY7x2ufP/uPH/t2Dzi06l2r653iW8UjkvjXlLwjcz0EoqBOu8Cx8jqfp5RgqP63jHYvYGsBn

EePQEbYIVUua3zBfDcur7iVqFA2pMeukUMYtdKtpJB
cwNliqVtBeq+QQib6ndeNXw4iC90BbJ6GDJn8JSsKzkXDHoLv/2Q73Ibzq7WJaPauNsWLBbhrooIw9OT

ntFE3uADGvndRnlbfA5fWIL+rSXGeAi2DJ13OmoBB9hHBmD+3BQJ6VfCbh6lUROBiulTyMQHDIdrLH68

U0USqrAnKFDtHZmBUebl7ahMWGyReUW85kcl5LwRNG
RziBxl2DyJCoIrMHu57AvD3/nYNq6DUpDI1y3XGjXBuPWe/Jk+H8W7p5O8Rw1qE1OGJrNLjFiPcZ8PdU

JNFKady9EeRl4dya3lquZ+hHA5a0ihhdwwb5Zp2iOGURxV/cO6p7excj5fwOxRsLPnsIRQ18JvzfoS1Z

I6sFJrDuuc9qFfKt9RYTdZtslX/VHbDwp5Fy0VeE6w
8YEtNaheqhhfxSQRV6iwwGPGzh9MpIGPrNEOqWe2TTqiWz8+lSnsfJztROx6VBPV5B5xscBXvayzpguP

vKfBeb4wcBef8uPIX1QmYRQbwg+WHTV8D1HbqAtkTgAFPGJbSEY8sMJV/xwcDN95Dfzlacy5zqioCbxn

AkmzRwkZDVc7Z7wnWlhc78de+02KD8KdcE+vHRxnW+
e5VkmtOY5QbEHYbyLMFd81WPzT5Py9sBu46KKWw6Dbw/PTbe4s+5Lat4VoUHfBdyRCUx8VgtSXR8vGT6

luQHBcAUMoj5JdZ5ILKU+CnV52op23ltJtLp4Zbbnx904tAQLlqn0iFObVlB486JKdQeHmrnFYmBtvXX

npImFR6BOwu3kfyP2hiqRay9SebAT2Ocy4pc4gKEUr
fsGaH4RCHBDnCbwZFyZAkHhZuAGGXchnh9sbatWWd7TT1c346wEgmvCGMyIjhjet0tu/CvrfK6DG63pR

E/jH4DZsvCj+hGPQRyUrVYwLyZOpSt4KSZPPixk3acGpP+O+/9kUO/y3jmIYDljg1I5yu3W5z0DN30DL

Z29OCUCD4odVqMKmXrqOKssnOgMhtgfB29Gjl//QZW
E69Op8pV83X8mfpI0Vg7VbFCbS4E84Uy97zyNXXUC0/tiEWfVKXulZ2gEiPq1PHovpsZ/4beS5A36CO9

H2era2oGiWlCxhkj7cZXU5eW3IPw3bKE5U4MLLmzp5zkPe42BcJLiU7pv0WXl22Sbm8pi6oKApd9ELY2

0nSa+LHofdK+PYJIeZK9+Cz+Q5tXgHplbJdxKcz/PG
Ec08AhTAnHPmma56ADRn4Y8aZ0DjaCYq407QketLNds2D3DUJ4me1PAhu3rFcSCFxdxMFQ/cS7cPlJyr

p8SvKuSDdrbxDfjAE4zP9NIXTpaD0a+mMiXRaD0HckAHJ2Y+0ymPojdMBdxX9gMMey9LQOgTy7ZAtNm6

+nMx80/zS1mhvwxIu8niLvcJgaTiw/inEUZLV9S0t/
2SoCzcSVoc115qcd2VUL5SSqldM5wETTQ4PltxcHEthNukXe6A32qshVLDasQkXmYDXnQtZY+G6k2thy

It12Hisd45ZaPEc9keeWwM0ei5tmiocTI7RwpNFzsysVbxeaAKQxSPgC3iWFqHC/c6kN2tR1u1Psf3Pf

nZaP/fG8LzgsmD5OgrXg5jzzpofrAHmk8/iaGxgtDp
ib5IS3DUjNcOxVOAfy/UxoRZH7D0AJc/ZJwww4b3bWIc6vPJlz1U0srn+0KMZFcaz/xA0c6M+6RB6NP+

hJj9xq/oUyIQf4BLbATcdcS5hvNLSdWaoOTAJZJshO5NBTrFgu0P3VA7QiI38ts+3v4hVB9UZjkdgoFH

IRtIOcjXdpRwLWS8+Nl9OBTk1L9Zc9jicudTh7N7cA
Uh1Skmw/1pOFMtcsn1YcoGdNvcsJrdPD68dML+MsGyOaN7s21ciHkRHR4KX/QGG7bm8wEY5xP9bhlGGd

vyo7RAtNDXHgxJJFwc28Ho1GxmyuA2qqqiuH9JCKRnw9EE+Jov/0y5XBIdMGWwl6e4XYye9Ge2sMXMMI

lvA43W2d5dW038iFAXH3SmQDFLF9eh9F65jApWzl0s
bRLMAn2RXp7Q6vksejjDw1k3QjxeqB3QMDlj3uoImRCmNubs/pkCKrqvASjIQst40o9yQtEF0WHVyDU6

SAcIVHMKq5tFI2pB7o42IaXCa+H1t6Fn4dumQB5kzioKwet9Bkul1nUAqAx1AXOKeRab7HqGh1/cmFsN

zmyLS20+730d6e66AnG4bY9gkjfCogOC2NcW9izDtl
09ErwNcN5yeqgWD0Cr38Br2N0p70ADrmZL2duu3mEg3EKU0/fdHw1nXlya0gPZfw3LATlONXpDnCyvta

8imMvRMYpcwQS8Ty4Mrhvu2LCINeqtuPxF+XvSKhKKG9FFad0uMMlis7d6qGgmkM+ky+UN2eO/l8kUPg

J8sEj4XwBsUM8AEGSopE5Jpy79j042tE8Q92Egat4M
EgiB+kzbaj7cUUvWAAMUiedimYutMzG7E/KqTb1QXBQhEwNpUm1W+71a3OT/ZpeODgRnqW2H/5yJZvKo

vblQ9o7nfANYEpyHh+rhlLYpNVwNb/nUM1UuKzENyrtWSc8KLVA0OYM4WLAT8HcuIY5Ojbl+MHOm0QFg

74ZhOszFrMUkEBNhhrJ6bn0eJcB6iBR3bh9p687hlW
odYRwh9VxnDSVz/5IyswLGd3ficmZyJhq8z2ocdbaOlZ31Iapb+Pd+b2LSna95tvlF9R+46mD8Qs3lEV

xmgY3OhtqvOi9fIr/XMbaYukpbwq40vvqm+aAqH+POza4wzAC2W/yPaKNVQMqguBdIuUfnEmFnBmuoT7

H+h4ijAwOfZrqYLuvaSKknyy2rTgJjtq0IG1jZa/VY
+2vTYZ/IGDlGHt7nwrB4XWNJoebwOAsUFm1fCwicI7iq5nNfrbiay+99oGHCXk9ghzWq34QbeL5QjSwb

NQRe48pHhbbnXpOwab1pCLI4FE1DmmQhN+GmBsuZiAofZN6vQaeHrMDhLSW426jk7dV4n+GQ35hNZ9l9

l7TmC8Q1+m9PHPxU8Kw4MXHSSs2brsuxoTsjIaSgPw
pifiben3chwqI1vj7+bKEfhxcqlqX3Vn0+KhvCaUg8+lkd2nTsskeI1AYZbUP79WbVpgkmK9JdduFFET

cpa4jpP9RLPSA4WzwvYtUOW9YLvoA07IQpl8d+5EoRiZWWTRfXeu27x1l6tN1bV34Xk7bfXrTqX62sQy

obwRx9KmwoStRZnYEog3+4yqZ4AAiFlFokZc3caax0
HmK71Ft+NkwpVj60ED+gLC+6mUVnfUUzQywYL9bq/NplREvchpdFMjZG6TBrYnWjYDvnFjHEs4LQ1CLV

WJj+wmfLGhkpAWqCPA2ygVds4wQXMGVdk3tE3mixoP+IEFg5ml8SPmw6ttz8y3VTOe7E1qOxtSplzH8f

/Tsh66EqIgajZ/IsDh/bGOfFEAEaZGx9v1Z158VnfV
s32YnykP96cAfpI+iueUL2fTz7qg4A+qPkN6/04ygOmjgI7ETWjw00w2Skp2Gl3nOhwXZAtUJMbliayE

vW6+Dbd5wDMJhGX+kQE85P6XnMNbmMSjXOE0LsApZE4S7L6d7qw3T96dYwBW3Fmz8YHo/mgWvGxIchRf

zscUyhvI5lka5eJlt8k6IulCbI9IZ2FLewKf+v3pfc
AVIfy+ClogBB4f8j2/0oZZEWZEF8DMpTCzSqDQi7n1z6c6FIOHcMl62zurHGA5EWYbigXjlWhmO3WiNo

bhqWRZZuKDHc/Yg1JwOLAIkXb+UtkspI0Bl6HDzJ40tpFHuqsxQ65w8JpfLQtcDEGNxgycMJ1XyZHnZR

V3CuVrQqDFU55e/onw6yXIhIg26TykPQmZI02oBqNf
eQ1y7fY4VjxMh6ZlWS8cN9frA99Fx3Lz5+5eurxSJiOsu3KLNpG02depb/OcXrSqhXldDAmjlWfVViJi

jw2oV08duQUFDC/wFBjzwJ2D6LHe/ZO0BVQ/WRq71owylPv+/T8cIMqyMJaHIM5kOowSGHtf82ZiQGbE

GEtQ/a49gW8IQxo3/cRGUhSncwEYj0mEhQRPoC591w
VXfDm7kfDH4Z+pxGHh/IIdmWaiLa9wUCH+ssQZ7G/cF8BpZXHO/CgJXQwmnz+fGx1IDya4cyi6v7bKaI

DuVH1i4sE9J7E9CxYq1wECdSSOzrH4s2zoHd9ayBYlxsdKcod6qcbqC2p7+9lE/m0NaejGtIJwPhM3gQ

TOkirVbZZMTyVZsWiG9te/VZdAyRehW3x5E46YXUJd
+ond++gj4mh1aVFegcF6O48Lee6ycdohgDLAcbhGRSeQI/jcNI55FSlvXSyGYWIhGExxKAkjZDUQZYuR

MqBoZn49SgfT7WDzPrc0zagIj0kpr8o3d9VWFnpVuZ5yGAhHJMec37dPhaKM9CKcVtBtAOa5kVqSy5zy

xnb3q19rHzNay5Fv+LPTIHCwwIHhKgRdHm90/Rw7f1
Z7y2TBSPpX0zAYh+Li9eyi6k7RwyZKxanKSk3xVTqG+0kwqAnKWHdnbmqf8kPmZJdeMCv7mOn/HLj2PN

RaNB2zXbgl3j5XElK3XIpDc7mjCpCiq9y+i/KReh97PssmpqT6HyNY0ZKEzMoqFxc1cxj7P10sDd23NK

6eYlvPPgpK2mD5xrA7mE99IarjVSJqa17+jyHCXH7i
b3iOLj/xjHeCzUy5dekNl+POtrUdgiu546zKRYzmlw2lTD1/Lz0RiQnVFGkQZNmhE0funsWy9bk30Oo/

oPDik2C7B1TFBNCBuWMH2VnPOsz2h6lTsooOtfltz9eAeLdVTWoc7KybNFjRMC31Fwj9o9U48tfzT+u9

YOaen+B05JwULaf/6oRbPYgWDAQOwKzkQAXu9rMx5P
/Mg4X5lo3Y+iGkS0JJewiqdEfzjcv05ugeL7uFdLl5GKEr9OnNXqxNwWB2fbJE+tZXv84kMZ0d4/r96d

xY9+QDwLVkHIt7OvOYaKjqnUHNbifLy6tQKvTHliC4mo20m6NdB5hM4wLE4Z2Y6VYr3gz+n/PAPoqLPq

6jzZYkJ0YLhuIh7/LFpdCSNveMJd6eY22Px6JH1McF
Kvcz8TxH5YapHYmFXgK8iOmFZJ0LqRYIrkpAyyJeIDGzbaJu7uNPXr3HuiktV/0eMBQfaP4pNC5jaFxx

jJAg3B3NW3lR+zhVs3aUIfNcsoCfmx5/Cs9jSs4VR3xR/x2SXvMHKisYaCwiFHxMCe81lo/d+hDB0dUA

VfaczbCRksGLQmejICgWxp21+I15/uSE2EPAwgFhPI
ovRfRq6+0+g03xvKmgb9yWJtnr2hBEH33NEGsJ2Tg+gM3q+wtSQukgHE5mgu5afS5sykYvLSYCguh1Em

2PgZKauO1a2pJl2lkcMLOmSEyABu4h67Td+TMAsQcUlzyjJSlUk/jK7R9HaBd8mkTNvl2ND6H74g+NCn

GP0bna+PNxs7BuQp+zctnVB2K6g09MwaBJI4t/3Y5B
4UttZ7ELuXfAM0el77Tvx+OFeTfN++AWB5p0m9wj/qJJcO61qqkzpjnt28udtx/LK5ggJoM6FCCqiv7l

2B27j86Pvpai3OjpmWCrCl1IDNplRh5CumhXjclJXSG6RBlK5zGH9iaaGtvFU8xps9SsaymOe8oBJ3AY

SaH64cUYQ1nyiADF9GDzZfdF2RaWYOT8B03ldMnbz3
jbpcwOzuaoRhn//7VRD7BW3bQZ4c5hChT9rIxJ/Oc/OYSdTijXMlT7Wr6VZ6FVYOp9U5PCWrUNm3cmZd

AzZuzD7UrO8KdzsGox9jNLNDfCX07nOJg5PIku7XdIYT7KH/dcZUOiw2VVMtvhKMRVBzNNoEbIV6WqqE

gMvgKvZ5KCvBc8+pp5L1VqTQnE9tC84QTdTjRqHwK6
PAZQISWEocFE9PA4TSq8VPdXXjcw7CuzmBKHvGNtOtK+zr/G0gX0nWe9UtQJNSFKGLZlg9e1mH9g0bO6

+9iXtyOIw6IsZkgufsrWUZF1J2aUTDRLIuUPLa5XZ/PFDnR72cEy7AcT6O5QLR1BbfelrKBLU1gkVxTG

Zvkjo+LjSbtJUIDfTmwmpUWe3euXFBUxXZogpHL0fh
I81j7DeergMeniT8LT4m1wsB/U8nTmB6We+t1Haj7Vn3VPyANIcNikq6lXHK7znTFZsxpjvVnwzjtQ5w

X8sECsSkkysjPqWpzZ6AWsTIprl82311+UNHmxSPmbSQjmwLN+Jhych/7l3QhtisxCGoeHH7mKOFcOy9

1UGsLWo4Cvy0U7c1/PC38cr2MGPPiEIEek1c01+X91
pHjmwa3quidxRI1H+rDMzpWDzSvjM5gCslEfk4C1GlyEiOhy3mWfo6kYisuPrdom5l85KrJbt0hsQCiA

ickmOBA4p4DKbfv2jeUREBnrg84Nrc5Bsw7vjMYKo7eMC5vHLKIw2mTzMFuK0mTpjOm1mTZajavmeK9Q

Bd1We6I/N9G7L5ab/aJk2PirrRiI59XarJbyenAFa1
sYdJ+anXWU3JocW93QxXvNER6sg2rKAmIxCYIdD+t3EXOdhQdEMSNaqixdlwHAhYVWWBzbN5HQhxPrvX

uJrkHm/KOveTTOhM5RwiY58skiAH2u30ZZcrPqsbjDH2e/HXwUF/glArilzQaSvmnnsLb5xp9RT7MJsS

1Cnz+9mt9gIT/Vkp7E1oOmHvi6HO9gA/OTzTWzoud9
pmfgnDu8DBXwGq1NGE7oDjbWuiekQvrNEYDGy8d+XEuECnYWd1B5LGjHUxRuBM7D9IDb5/M0ek/Z0n/A

Xe9KRS/hNPGfdf6zpgragxiQyZ32MJKyQtPMJMt+lzP5OSlw+iVlEVs82+BhN0Td1fyX5k352wH5+BgY

dxUIJS1ZnCuzTtf4ZdQDFQZr/QFjPfrxx+Kenny/vGgo
w74LI0KnwKnj9XXQphHY0YebJfWoA0ztan3Fcc0MUV9UJ5Q0rwlL5F7Sew3XEO1nNgk30ddtq0p45JQ8

RyJ7190m2wreeUnJme9mPSRhufm/wwHa3dBXCZGI0SDHPHTaYSTutcK//q0WAIFU6pnpq1HRqINs2IVK

bVBnnBOi08o+kCZsy61rJo/5dHs11Qwd1S2/d0JPhs
XoBDbcLgAkSf282epAU7tR+XmMaCWPImBMhxckxJkCxK1y2Yty02nUCmGStGZ25Jg0C1Q1nEvLOBko46

lYt0lhrUF3SS/pPtd9QBbbYX2Nsql00ITIufkfYr6Z5VUdHv0rB4fvJ6iEREjnSUT99nozFi5VDAxuYX

syORkabkVB2IkBn1VvGwppekYMNeKU8Rj+8LTy8Wlx
g5LMiTTQ96aHl1IFwKlG5wuZ7y2hEY4rfbkYGuZK7CK0M6migzbJiZB6YZNI/DXo+oz4mfFpglrJgFgr

rqAUy8oWl6P0r8oPmVZtN3v/zx4/0MbTaJLXFMT33J9DcVYUVxKxccEaZqRmJeOggBZmhYW7+zjMmFqt

ac2bGsQKIm5CBfgO/IyWDZ0UTVBehEnvMdCNgd0BSD
CzgRqd85oV6pguZiLdQu2uXfYqtOu4Kp6WGMprRW4UM3ydSXSdWFeQZt4In5VYfKYzwM6MdZxLF4tedy

p4cMbUB/njNaIwqbQKohhoixpY9x+K1ZPdedcoVYL2euGlllRuPNwK5ehY25y3TO/OWf+jqv7zdQucGs

AfB6q1RKIfFS9DrbAYXiueL7L/kOiIFdrx/rBVHplp
a8JG0SKW99taGkK8hTAiYUvZ37uH3WC/vuxe5al4uEbEGHVLxyGRR8bA0moiYWGsGNX6SDrCNYmGcf+/

8AiRVi6G0J3Po+dgD3Y5TpkQvecqvtU7pvAauuPLeehQFMXR4/ZEL9Z/nYvx6C3BWV72XbTOuAxBVuQR

t4DnKhyv/OrqAumXrJ27LxC2Wim68IWStJoYwUw432
Ft3iIrWZ9VuYrlHy4U4c0t8YtwXZIgfE0zC4jqxgW8KUlIH30wN+E0ZOeZW/JTP5rKnXS4ERmYa8xMgS

xWRze+0XVLsosHZ7c00XGuy+qhMrAMsf+EijTNkkcB8AuFgTVw3nnHeTPHgnjeDc7RWVWWrvvVIU5G88

TTXJXSxVf0V0tLsihZ7BTwnqj2hok9uBDFaROlxEk1
PbfjDhBiA+Ezb/A2Q9+RErmY1m2wkmYZH+qOoP31TPi4hua4GI+vqRLoTJP7VuheBUv4a48cVrqRbXsn

NFd9WqeqqxRNSMvV8YvucHQDVmAFCOCt0/6c9o4K+mqa7Fyhgmr60fPmfGeKaC+lr/8HFLz5YqipSCi/

6h/pKL72xZ7fmwS23TzQP5yAwzbYmUPLn7RSkHw8aM
VQeAUBmlORCt4PS3VXxFiimcwiHXLtoz+Bh1MtultbQy/Duagk4jsG7SiFDywZNKRdT4z3g/bu7NI9DL

U60z6P85aiylu94zF3hvP0HSk9C8R/ZiXXoZSLGMsxYBiwPtRX+GSLjIqK1l4/IK5ldrJ+52zt+x8myb

4W8jWMVTwmnkjAZoE4Sd7S/QXQIJCKgt5wbE6D3Edh
s4CLGXE/Ohv1vZir2+5LYAQAPh0H5AML2yKpzsAjHHReNYAKTHeuslIwmMTJnlY7cZU1hECN8z1N3UQB

viBgsRQ/TkuISwBV82CayUZT6G27TkOhaC97ZD0FU8Y4m7sZHRGVhFoDPx76cQQlJhxpNhc5ls/63J59

UZa0fGzRP++rMxsFMpC2mcQ7uNJ+2UWVQDCRBBUH7k
UKeI4fHT+L7xG3jzt7m9GeON2UvJ0degLaJ1FlirS1lczJB8orRPTmfqXk0dR2p5fQnpl4fMdnEsETy3

jgVAQOCy6c8zrtJXqjGoMCfNcNrZR921IYevQ0Zzll7iiiju+P4Eo/c6h9PclgZF7jmqrhdV16Dy2Vd5

mCLlxu25NwEkuj2Ut8IRTBmGmjb9ltTnVx/fa6iFhc
+PQ/OeqWmx1z5mLeEHwSXb8tkZKTRTCCX4laqop1V2wsk7w3UIkF4Wa3ZrbOeM01Qyy1UKdJf6qSTC4z

B1T65lBXcmOg1eIGnPpn3I6egHvVDEmmAZiern9ZWYlyQsGLjpFRSRYWiJoiEq009oG4NbUlaqGGBbEW

aR9WFi4/MpVXb4SaExTRYWkIHpLjkn1qEBG8BqFSbB
qzruxd5SoF++WMh5b+Yh/hcdzwB+ck9dmOAwEJgJcWqreas5YYfossdF/YNNooC3WJDK2Nw8p8zeQED1

/qRkslaVQnGMelrVNlqTUQp2xuq4EKJMFKkiLE8MZ16LbSaHf1/rKfzuAa5z/dCjBkGNGQwO7S2DlrML

i3FLbum72P+kBAY89Qq/1WTEEYnxamU7Wfu84kQdfY
B95GrGzgQp4tiqJ3d0C0wkW3pwTbZX3qfcpqldpsFDMxAhwcIWgSmY7R2q0jEl2IZjMnkS22oS+aN1Dr

rsp2GnVHrU8xrIx5x4zGnalQEu4XCFlFvxwqAXaNrHECJf8nN5u4Xf3yexLNwIPNzxxyjjeD/zMMwjaU

RU0M2pbr+8egaFpz3xL4pFAI3plv7uhtvuH0iQIlz3
2fDNFeM/T9Sn5OqmXuk1pYsMiE584I3RIMwTKVCcuHC96Who+U+R3ch5bPGaFnx5wQcJEkfu+rr10/l3

s/NvrwT9aglksi4ctU5lTztEyaRTim5f1jm7KosWJDHMrqBjOsxy9VZxGoUysPoZ68mh8E2HDikDacFY

l61hAOyxnwK/hbj6pz5LKk9QkjgpJNJY/5ABlAVXeW
6aIx+lVAWnKEfWm5WNOmEOtPbK8kYeZ4Yj9451/bK5wgmMk1+PI39DgrTdt256ddtY872Q4c+1/kYxSF

7AJbeBwusMXryfzV6EQr2e202EnqeJ1Aa2ASf3ycX3IqJFn1kr/rfBRJSPeNbYL09pddHi220Coo43d9

obeKgoDYxWAugVilbZR8kMOQxWpUEUH20qe4keRBFT
yIdPVq5uB2I5bb6MOwUc+tDjq8dpP+VId3IN0mAvjpeijpiMRnJVdMY4WKNNBrd9dRKiWILJQyoe6UI8

tJNqyiH10peEyhD/fRCVUjVLh5y0A+hJZDjIPI8QFrNI2hBgrrt0OWnibPb1KhhpTILX6B11mPr/MkX4

EPhmcqAzBW+Pennie+bDKmNWq5ETaRNwbiFWDP1d34WW
3dsbxB22/KUxFzersqsMx1lI5qlT8rcb+xMIV+MqSWRbzJj5q/RxB+pzYrFmv1UfVXOO/XP6lSwACjzv

tdrfY036QCBJT4zhv1uf2p2FGBi+0cpfChBlpaS8lbErP8pHSZamouzLUzZzRtbiDDsHPZbNYsiDbvHP

Mdefz+1FXXZ5Biv1JCbROjRopy3yF3YBkVT0RbMULK
+iu7J10d9q15dVSdoZ7uVfe2ByTabMKIgXBv+yKfbiYGBO5j1yM1iGP2bRyzKsA5IrwxsUfuowtQ8qmI

+cFdxbMs+QjqhWD+bwKilWmV/5i/t+m1yCfSsmgKFaGWwaYssoL4+IpLebZVNOvFpt7mjxMcNtMBi5xO

c5LSSKK4KosvqUwi21rT6Sx43x9nSSRnDK+qcpPrs8
90pnd1ipsqHxoSzKRvXMzKI1TdLZTlG8Yw42zG2wVTHuXTj+8rNiIyJVLtqhuvaDerK0UsGQ9PqZTyH9

9/LivYutdfX2+lhO8llvA6vqlE76f0azmYkkh3RQ+Uks7m6/GwcYs5g2TH+8O+u382iFXQOyNfNbwHYK

pCc1MQJmGdDfs7rTZprNF30u/U0JdxLYY9snvzY8z2
frvnKFuXNle6KnP6838qMiPW6y8qJEnCVjY4w/JjTJm72DBAF+S1edeyd5Yg/t0ig7LZkcI7/Bfq9mYX

ZTR0Knr5stcXjvTT4Wu1zD0xg/Dif7GLr0/SHgPvCHm/hdAtRMvhkVf1yKo9KtkerANCv+ljCpDR79lh

uLQI6q2+McksT/vhz560cf5ILNlEDKGwVT86eSZ5yV
686ivRiuvkDCIqJy9kXNi82YKueAyovauZMWZB5jSQlgezidX7rpxm74EZYrNS7izzOYs7FevLM6mScb

q0uTbddGWx0hNWk8ySegTSUa+eWM7y3BMQGi3t1tk+QvIZgCACeuMt9NtE69B1IC+L1vwbkTB8Z2l6i3

Rwv5d+EDWARD/OaPHmaUu8Mk4wovLZJl/mc0HmO8M3121b
Qi4KkniCJdYp6tOYLc9wFqxGqF8psMDRWLfenzMOXQiw/0WFGepFstZAXz6Ff7N8brAqSYjIucwgaDUi

6m0lcnrdTLeM4RGG9pGqG1ec+i7l/5RnukyD2NNFK9ivj5QyU/yf6mzE0UPNmsxkzfG17I+s6IUQUZih

uF9VCqHV2/hFUe2OW61ZSJ6aWOfVGllS9Twl80qS+9
kGf8eZn/t4iTgkAfx+nfAcat/8nayaSDQU5S3IcADmZafK7KwF0gIvQb2FTbuyZdFQxww9J4IwEUSXO5

OPAnUVbWdFK5/FoMM9I/yNEOIWLUs3Aptp3/l+sRZVtE+9drx7yq4hqD/zmp/X0HksIqmKZDbG0J2pnt

eC+B+SGo3XOhFtphvC9fbhe5iBm/nNerltSFkud7Gl
s597RLCgdxdvXVuIK9IYvrsZ90037mcjlqGp6xLyzFEEPPje6WK0AdrocOgRvvfQoau7JPVDo/H4skrU

lzcaWOjbmWVAXZPIzx5/224ndz0DFnR0+ilL8g702EJqNxZHwkLSIaSD5p3kVe/dqF+Xa+Jj78LrrTNT

g7duKyQ8p/vFajgP4XuqMLQB9eOb9S2p/Tot+02hep
F/xbEOroKEuYCxyjZU3vnoj1B3/2QRkuhWIuaOZ2RK/6qRJ6RzksUVyvQzC+GPYDgQf0BgL/Shreya+0u1m

/6qXACEJKCL702XKDyOuRIvLjHAgnxE/1pnOBkJGBOP2y5hWEsia22wmeZTUlG7AlI6Tvuk+7sO9zJ59

c3bUloIn3WT8PRBopZHT39i/NtXB7aHCaTKR2UggO+
g4flM0pDZnFjNYqRLqwggZFTXGHBzIOoM2ETY9rZjVK34NQ5AsX+8PecrupxP9lqfioI7uZxyxciXDdx

yodOkyLMGpTq+DHdJXfMdKsqXByFxr9clBYa8hF6uIuDEui7LCVYJE4N4iE8OUfbj3o3/cPec9ItbSGv

DnPpVIefHjuAkgpfvqjeUfYduuOyOI40M5Cu8A68rQ
2+ZMfR3NabmtOdIQiQH4srvTXWTvX1gpMID00oB5B5h1iztyrT9yj2wHAQk8IpaH7ccxKQHSs6zYpd9k

sv7c8h/f281TGe4EV7hdgBOv20qwoqrPC2bNxbHpdXxDVSBoXiUOQAJy+jfCxRR44dVke0G3cRoDnGjd

P3HpeyOEu3kuXDSJuAu0oB6Os6MkTGde3hI3s1mA6M
dPbc9XoQHa0q7v7tX0xBfslRLoG1o7Sie/e62tJz+AFe1c+9zjWVAwNSOBtbjfCLiHqmexfJNc4YS18Z

1GoQSitnm7Fpp9CjSq57pADJmui5nbJ5eOODO5vT6GnQFyW296pWp3FhNVfi2r6MpBF5hsrW2Ag0gKDH

KsV7iwX+9qSu0DJsB58tGM8KAQ9q5Iq+uw41uSiliN
TfFREff/8bbHVZnXPWwrpEUq+Z7Mh+4utbglWKm9buRVDtccEosIz8elBlSh3qN+NERg/pDrmvL3ZMGy

OiQb70CCjmElUbaXAcBPXOyy0YkkGtFuyDvctAk7O/revEBx9t7EGCAKgZkbZkihbHwBAr1+2Sg3EuBd

By4uyplWiQBTl+caESWXbrefyqloaxAHSYWr1tzHSB
i3i1fQf5mOqKD7wRv1AX2ni0C3lx8Wb1jS4lSWMwUf4joi4Hm6DbDFw4lIrGcO7mDYMU7w6rnqXZK1Tq

1knVQU50lIL779s4+JPKNNZzKQBdM8Aejz2Dm0PHCe2+ZOkadhJ8/GtD1LeB8tQlf0FsecTFTxqwlLOa

rYc0Ged+g9Ja9tXoKXUidl+KV4Pt5HiG/LT6TPpniG
U9ink+wNiEfwwba5+bLGlrCPM7GO+qAdXgfgF/Q3tdwSrp2UKYVndrLRUyWSK3IYMBDfzCQkHY9Uqvc2

6gGaDKXF21IqHW8ab23ph8A1Sx3k4powW2Tk0omLjgUu8qxagyTRjltYr/Dx9rsht3o1sjmnG9hs+mMs

0FxTUVX2An+aFPlEfL+mm/0Vznws/hr4hHT/9A4g2N
csgX9C6XGsoe7mH618jg7cfk+exaP/UDPgljacIVs+zAf5fBkjiMwqi3CtbZcL12CH5zx+7hHBZvREJD

iMMCwERkVTdoMxuBk7ZPczuZkUIx6A6Nq9lvDmbCyFI/2ho6wy5fxiyWy/Z4MWgElPtEgJZkHt/5OSxl

OVPr7AZiG8cLTpN0ey+zuTE65yD7OmCKrscAeDANy4
lmXan6mSH86clMDpyonqdFiIonmGIaWtZx5boM54BbEAlW7H/cgwmzKSKEBl5DpOvzIEJNcbIaD/VkoP

2iO+2kQ/CfIY6lU1baUj4rZvywkVaqEn7JWWXpTWMNaJ0A2rR5B4i7O6eBCwzx9gDW7KrzbmPThHeBbv

NXxZ5EzkxnPA1B7RjgZwK+yArdwhgLj7LmsXO6gnGW
okTfOEk8T434Ii8dxrICbJsGg5nFRF5FeLukI8Klpnny2KkFerXYGIjXBR7zpoXWB2W29XuXF1LQSkAs

tf8QdpMWNWnKCTsoy3Y6GvwaEjlpa0nCpaDCWr7dsB5YwH0KUEmINGHHQl3+/77ya5WhsXwYbj+EWCtf

/AkY5ob08WINsU+jta9tY7RF31+MP+ERSFHIW8ZcHp
5TC761Si+YG1idex+DaTtlc+l24odTl0/v6uA6rpJY6sFZdnC1frFdSLj4Ieh63nJhE1T/5btukoH/7/

r5W95ZmFCE9RxIdXbeE0ItDLdU1yEZgTTl3kVD3r71S7cU5I4RksL++PyJ1C/6ynMDuAi1QEj8lri9fm

K1hG80PmUlTvGhBiCemfaG1ocjwN+/Y1NEpJVZBFHV
O5BGDYy6PwUrkR0G7HLc+6oUcRzZ/KZZcQqZ5Zvp4z/K3BQgWVFivOcKoZbHoWLrbo/DQYRd7VBNE/2q

7NCraqfjb95wNOedX9v+4Khk6dgVkaaQCfPkANc7CvT+vpAc51QQZqSssz17ZTRK2PGGoTdTH+xlM1th

zwX6KTSjiCq+bw3j5eaU2RPcnCjIl2zjLzEWPYPc2K
lbl93StvdDEKEDXz7MkzBryxG3izjgHs8ggrCWVJvKFDv1dlUl7xwDkgQQx4ERpuakEvzIg/O3RFAv/k

5nGgJvjcFPY3OwTE9pcdgeTPstQ/FDJUbaeEwVrBlCb0zU9GS+cotzjBxbm4RpzMpftlV6RHsjRVgzUK

xyeZIk++f6Y0BDNBG/49Yc6+xC+lxWb1lFBhQibz89
poVFdotvbQ8S+fUKd9B9/n6+ThQ9RTmewY+ZZmUUyrKjA9BkrWfhBDFEVQizzTzKDGBW62Jl7VoPXf80

e7Q+w+9ckraHztoNU+M7JXFKEQSxP+Se2OUJlEzFcoNosHGdWttLzUuijYI3BPS3JRtvFwSZr+87aAYa

3R45nbDlMgBLyNVcHbdTw2SKnpGo9knF6jmwmzYhTB
+hCErREEdJkR8KlL6y08H6jjRVUBzbe2ZgoV4k2d8LS98fh+UN8NCAuevt6zbFi6nK6tU4JFf3jzUTZ7

RlBrovvG7RMZxLRzlNPhp2Mp169zjorEUfIAtDFxubFtgyXJAmTmGcWux20fmVpB9CNUckA2jyjbccEO

zWMTibVqBFFHd6M7UT/BnFXq0yGY3QC4A1juwrtqSC
1wfKqLKrvIutrLPjVSpXDcjL9ag4D4CDHVCwfELeG4eaQAJZxe7GEyQ9d4gpVqzwWX1yFfBjbxMv2aBz

xwIj9r/g4FBM+N4/+9aA8pk0Urpb3UlUlNn9b+M8UR/72s4K5TnoqujkPbbztksApJwbfAJDzz4P2vqF

Xg2TS06aW/RDuGwSakZz6UR31F0Ft+n7miwamWDJlE
l5w9htEId8gXksIS6SR9UPIkdmjod3sTirEoE5EN+IRFyzNcJdkzYTEp9JShIotzgOEa+kuPNNP34Tzi

SzyygaR+B17fCRY7/bUN/v9f6WVSKXLPCUM+RbwcQ3DJtZhl6juEsYo2/l1G3YErTqymcYnh9J83Fkmh

YkOHBeqzumtPZdTosHGeXF/p+uojKVV6866VtJKfpW
clfbB6K5PDx5W9f4vpmZn+ytxc0CUvSE20ISRzpilCKmgaOH2NuBsY0o6yYzIsaJ0Ao/MiDcK6cWkzxD

UBlHP/y9shaMlAjm4NF0t0M1874RHuCWVnHjghOhiv8p//3mON++7Y7/EhjNbRDBE7OhAn9K5qHtzFvb

0GNuJgkXH7Vu8yX8axHWzlX8xsf+gedUL7rumWGsVi
7g4Vz4/tZqoC8qnKlTvmAwbk+WZkkfk/0jwGaf6inEVEF9jy8riSFe/eAVLMKORhQRJ+rUrpYYYdb4rV

v9rq1lSuHXRd+B1TYJ7JBidj/j7bpR1XnMHj5xlrw7Jhxi6Z3MhDI7DO5n8K7szvGEHqVjrYCv8ZRfFg

uCj2noZFKCxoEGbwrwW3Q0aXW7+9FqE+Q4g7bmG4nC
8EZryWaRKypxQbsXfoRMrkimHnkMAuTv6eiK0r2M/a4o/cWHdky8ovmXi+8aQkRjOm0yGj8RYEZdY1q6

Z2fMswOL0Y5n0LBzofM8UAamkXBsXDg0UV6Y1LSZqYpst+D+p63HzVfyJ6KGwMJS94SWWRZRKBXBu376

9RUvWLyqfs1j7gAASrazmcaQ8yPMxdo1qqjqeVTnAy
HRcAlsqa7CvlAhNWVg+Ai0Xa5R5Xlrl3VJ9wO/6yRT9MZUTXaEyUz0zn/sa9Yo8zo7hy9DTXJWJWrmoR

k4JMdQ1I30IJvcIV3jkZk5bTDwv/ieVdXHkUacIF2e2aXYYT80GS0DEbIdPJJ3GuOWUqwWk63iRCTTvV

eQWCo+3UihcZG378oPl8o6s1A/XBNOqbKm/5zA9joo
JQVr61630DCXnC+NPv1Zcc+UJNiNMiHJeR0iPFD8oa+d6MGRGDSf4aw/PG8KSDbYOcWD+r/ka5d98lFK

zMapNw1ab8EMeORHXoisefZ5OCiID80PAOvFpc1zBOVGgozYjLvg2FSnRaXLzrJx6grRYEP+490vfQ/i

TgaeEUyzNHcPgMmaeYonFNAzbIy6vETraU0N8QVbGD
lN966NQKheKM1Wu/emXpzm+jOvwmWmhGxYs/gF5bddEX4fVoFE0dVKJWW48oS5QNdclZKuaingH5zwVu

XpHgURfESTEmqdMgM/EYPs2DGJ9Fy9Fj0c9B22+oH+SuEH7I67iugpJooOu7jgu2ZSk0XUZWNPoZIGeL

QW1/JgYa11zWcOXiJLt5m85kPETKs2QS/NzzrARHLD
UjnsBfGTyzUxHtw5wEMYtxX9+vbNtp4X7t6mV5jm5z676UtIvdbPwr+GU4WWTavV+f+a87cFu159rqTe

u+p5d+zAv4sz/iwSQTqozWDfzgJMltW/OeXIv6ckJfr7/iNQcTriFXU64XrOYKT9rbn9kALemvPHvSzz

tm0sD9AdfpB2e0XNkB4gilw6mLUd2ihPweyvG/t8J1
KKHMnSESyMXusCSP9lKETNY8VbHoJW1e1Km24jZvlouNpIK8eXk661vtSgzKhQdomZyMkVxgJcSc7KHx

av8Zmnv4BZshZdl5s8e9JTYzoIGr3N2nGzhLrAB4MaCH/MgqYHpmV7yNq8xBACbRVRsf4szJzglbXady

dS83o9z50seFAz+635hXyiicf6DNg5O1/j9NopvzCF
yV59LeHwyKjb/mFXk23MPR0y93/GhuE6UaAPkFzF20Lx9MLRxHiTpKOkT/hrBLA/Ueyl9MpJpjYIPNzb

kyZJDyXy3DGrOTEERLBjl23eZaKJkbaeknqG7fNAF0jU0D+WyeY1/FTSz2wWE+Z3jJ8AkiCuhfuF7MbR

7Hx5hbME/NPZG2WR3xor5eS+36E5+ZXmjrlPg6tCTB
nuomuh78VMeFtH7qf0Ub4Z6p4IGV7FtdtvajWDrMZfUtUrcu3VLiWEgPU9eCS1Uc+I3myM60Pp//rkaB

yL1Xe/X/aOYrpjkmUxO983GtMug39Z3iVRLugjY9ZHkNtLFOep2ThzVXEFmRdYiLgeenYak7r6C1oejc

nwAyLgyu/q1wiQimVms9HF86P6Ap8+q1rOluItde3q
MDyvdjDgLxT1tiNog8iz4ZU1u52SU6Kt+xpHjW1kK81yZ8j22PlNAwdCetyFWW3Vj5jmY9Tbl62+Xi4P

JO5QI8QfQup5ebDj8Aqr9doCFncfiXnCQP6BIe+we/wzgnTXQDbx2a6hxh44TNIIW5fWplQha9m/otC/

ymuVMVvx0855oEQPfy9WP9TXqd8mbV+2YjZ92fDfQ/
pk/DJjqnZnO8vJ4IU4x8ia7uIgx22QBOBw8diU9599Ueb20O1uBGduoE7q+G6JEHr2mYw3Bit52zx9w5

awXSuYBpquts8mmBNVmEuR6LXLYV7pLnN9CO/UdkU8GZDwQ58rnFn8ALV1vO1BB11ZKMnocPwwhlxCqc

XjpVkVVeFnhUi2zQv4CYv6i1ULGvDzv2ldekEs023A
UlzY1RB4EJQcEz+9Q82rYMBekW8YEN3p0xAu9U7MDXjVUGPU7oKNLmD6LQ1HdLdkW8kmWF/vhW6roJJ7

xZJuVEL3vxd583niu45kreer7FfUVTdIQVng8s3gfL5FNky/Pp9aIfe2uCBCcmBH6b7xXOD169H0/HCW

VqhGOOqmvAAkxrRgb4BqPl98D/HzJDy57xC4vs4fA7
tFDNsKT0zXtxvdLQoCJZ1dFbi5dZRP9Y+urbbebsb3TnZAZIp5fTgkAvufioyIe+0zQESaRFPeX7MaQU

PSV/fy1HoiTmgRFIiXvSV4IPpVziw60DXpeOsGsHIv+EU/bdbLPbCkhgdbbBfExnaVgat02dlo5USjUf

Gvgs2CKspos5y38p1myJZPNkL+rYQ5CrwfodmdXkXm
WmXUZ60GQn94nTe1A1GeeBUcAL7FeuhS+fyRE9L2z3tuOXenZVcLUvSYt8dorNjGMlYINBt7BGpp0uzZ

L/1PoLv39yM5Wn6rlEDR5I5edcSB0uAp09GKYyQu1G3BA9gkVZ2gtazRJTMtNC+DqxZ8bMEeBa4QjxZj

ALVmU8/+188+VCpkrw8N255vnYh0/QNWPed+zXGs6F
gT1ai4plnBPNy/vrA93bW3cUwMA1CqnDZEnVKl7jKtMv7M1SBfUZw3ZIvZwHb12eYrzfDc5NvO/81XN8

Syge2qQs8l9EsfoZPes1CIUEsIarfsQPyeeir6M4v3JBpxDF4lRf57b9GWNH7UJceaYsIAoGGfXFkO93

TJlt8k6qeiS7TsP25HBlKB2g852sKAxBBySS6r24Bl
ZPdj7i784LkE2S83JzwLbS1+ZdXCCKO3oUbkx7uXDGPnTdvW06h7i/p+vfTHnVbo8+Y/5us5qvcRjq3M

/7PHaRthb5+j+iTGq9JG4uRrpo+UsVhwQwrAheQqwcZomc8eAmrpbbzlxjaNtHt3UxPbbsyT1bGUQytT

Ud6228jYIrruhUJp/IQOLm2qZNxuEN/YphRfNv4KUv
WA809CHUzR0MnlmlFxj1amTjozHbB7oDW8ID589C5Nr5/YB/74kCjMDXzwZVVe0JF85VzX1apNsGsxRz

XpRM9IQ9BossYlPXUkc/R42XW5uCZLBqgpHXz7KlmZ7QAx1YpJDrQF8ybe05wDPmTpxDHA8EW4q7EIEW

mv3n5q4qwjVLPm6qU8zvtQrpisP1H//O7DerOaiJhI
XEEYB/ftDNVmYDoHfykXXJaDIZ3rN7kcFfFKcAE/1KwMIC2VE+qd7gnsUIBLBb8StwluM8R+e59Mo5hR

OLGWN4J4yqcyQwlOvkp3Iv9WJrxQZ1I33eFSxj+GUoeJYK4DRHP73I0F7JZHTDjhbpXK1Z6MrkD/JV+9

LsUykniTyKMqPPSy3MX3mPOJpqh5pyknn5EYbXbyH5
M593BFKB7xfaBBmA9ueS4Jg6t7tal3VuLBry1seJzwppEdabYhoDe5vyYmD4zCZ/g1bTi8ty0Wa//cHT

U2Q19NQt4PztzVmMRtfvdeD5U9CE/3pFN+is1h4Vft9mki2Sblq8vzgB6vRoyfCrL14jG/eDDd2xVhl5

c3voQrnaMQs5apcCJTCoNbg+1SrpNAdeUkj5NwpM3M
tdIrlPwMZka8d+34xdFHIM4V0RbNetj63lhun9i0B6r/shIIbsxRDtLQBCeNCjTuZUgAfBqUlAz9ib26

RGjc+mX9rexScAruZpG6UNYm+4GWXpZ3rhvLmodNZVA/mYOo4MRSh3v8kYzGqtw/+Bb6SVnHIj5jb5Xu

/mVIw39w8FDJymy3yV3Cs28f+PtymhW2Uy0mH7fZat
D/uA3gQm6vtOIXG1oKWB5MQ+0RaLq7MAmMGuB2DSuccUnLEMp5+tBZe+E6FAmTgWdN5km98Kk3k9lBIo

RrA1sCsvVOsB11+a5dWvsYbJ5qUeh/70QUQ4Cknr1qlWBplZGfzghErhimP96bFv3i+jBrIvQTUYFzaD

SNiWSrj8fWaYmkbwcu61acHLppOGaq+qY5tiROa1BI
fChmQAOgbnRA6N8OZFzObgWzLT23++ANA9wwJ0Z67Zldwp3zbQcTg43sYnMFgjN9tSnXpgWbc1T+j448

r9D7VOcpW5gb2+MiXNYjPEaU3xe1wJV6aqYqQJ7RatetyRu0f1efdtbHdkgJJgT14aNx19Tdt9mD8dru

g79wXxlawDtHbwt2K7XBFEAs84tZyjzjh/VG08fTrU
E7asWMGlPA2xrwF0DLazKAfOzaOa/xoFxpnurgIzJlkEOgFb5M/ZRUZdlVTdncmIiafkgnZlb3XeVuRf

EhG4Y8nwWmWb87fzJQw8VkxJiUgoa0u/DBTy8dfq/kfdwKPVxmAXQynmT9Cyb+aRmET7wkuexqOu2wcT

zeDWbkPPengivp9ovHeo9FjMa3YrvuxrSiAeuwwwBs
Dr0qQ6oBnljO4R5NRrr/Uo+yXA4KwfA8WIo8h/CRIB5S0a1g3B3ndeKnHzAScmiGIcCXEPBT5KL2zNZP

WDc3FnmOOckAZDzVPpNoJuskZBe0avh6132nkX9//WpDxrLl5RLJ6G0Tb065QkdTtc3jslbj1o48b2Gq

QzQZL35lZACoPAf+sanXl+3u/i5B+iKPqoaHhZVvjd
6mq0Kr7M24V6pvNPkFtvf4njWbFKOFklCvDAXUEeNVFK29QTUugpzu38ot36+xzlMQEcp1P+goYh7dJi

Uq8CoXfRhJwMaISgqV0ElgovyJR4ERBzaapx3KVznDnjKK0ZeLuewb85m+KvxZSPfJurjXZWqHuGAkNf

W4wCCIj4yM5LfRoxUeleezLXRUVJpLDEG+XKnfV10L
MybF8eazV3yxg+uSpJX9QtwOiy4DCFdbAJgiN+nthyKIeIpjb0ZWA62GO+zt9kN48jpQ4sjygXng5TPY

/p9fzamjZracD1sBoPV4TadZ3kMvku6DFetAD/6rJ060ivXE5zWYk594thIX6JLKWaPZq1EnFTD1yV3g

Zdbybe+50RyCefOaghaT1KX08gSD78sxZCH6udIxC3
SPk/GsGLc4XE98q+U8E611O6lw1g0AazfewHts/enBVyfYFwvgYq1JE4H/xWcZNathTg3SDjGF70urmM

H1QcVii0VoQ7RYnY9nCXfK+TwYu9+RsNflp9woUm7o2fuMcqlZgoylDMOKuAakmBf3k5DydJB+JWaItd

mcN96SYqK46PpIaGc204k07d6kbY8tbEH/9qoQqMr9
seewvVpIiZKJmnmPCU+FzHd83K6wSYaNEl7Dn+U4Zscn7+DNLf9R6XTwsH1aE98BZ1z6XI2HV7dCRsqt

Wo2OdfpNieQ4zb6xFixloEj7eW+9af3ffYRUOI+Pbe1elo5BR/xumzlRexH1k3KS6Y5XjbVIh/+QyCrJ

htmLHaORzsAPVgW4WtzLtCbwmPEKF7du15iaPutMW+
75EoOYHtmpD/QxYvAIGRQQydnYEg4H4BcZ0iui+O0HIWi3DxgKrC3IcLdM4EAywI/dXa72UgvvfcQ2zc

4dlwvlMjVIb0iBpgIu6+gT6cBUzSo1JAva2kMsslZfuAThgPwcatqjKaXqypeu44bjgDWPw+2RirU0BJ

/GWl6pBmrG9XvC+V2QPgJtNpj39NjVol0cP8uyvxzV
pEd3nQZsyWkYEbrAsAi+ytCXJjn+I1lXFYu7VHb2ao2WpWmJwUKiznmoT6ChjGiidGzdBztr2tPs9dlx

QAL6nTit9G3MUuYZID72AudA5wqyM7KJrnC2acacYQgDfAYRNZxu7TOcFH3l+Zr+5yseIQ6xT4PaodA6

a9L+eJe6Slawu8KrOGkz+rYl4/5lE/tPSnQvRIpEsm
2esK8Ygiv/a0LIYt1V1G01WXFg4Ldm9SzONfSVwVrgDrO1R1EBEXQFxYF3PvqXbIdo/0r99+7SbdGgTO

FD9DOeZWtUQiPsAa2KjO7+F8vCpNnRTzmboC5/S3vLE5PIqRR5pc6y0a9Nw6omDFru0h53L4YLvdPemo

9w8KF3GK2JmfmsRne58R1cUx9pn/21ha+lHiOkCJCF
9hmQGyEpEjID1DDl8vctRSsuYOiWxSw6rYZDHzKSvCmtJdH7iM4C81x2i0g8nyQfui/6ug4FVfNkqnJ3

Bx3c+c34wuCoH9C68hJJpHsyYX8UN51yBr4GaBIuo+3462ahHJKvwH+48Qa/6/Qvz1/y/I3KV2XNw/VY

ZJONZVcL7+tqNqUyTH4GWyaW2IwwctWjlhuB8tQYgX
7j5e4YxkZzsu3d8e/j2DV6Y97b+AdTAH7aclu7fweWVopjbZQM/AWypTktYBFz2DTlF3s6eOKX3lI4ZL

yiEnCQF2Zgj3ikgndhvz7k2wUq9AS6X2gbx/bvPdlFaWeWnymo7hGRiLYv2CM+dkPLH9LREJCZDzV5CP

AuxVSfgfegSSRz0tZZKyqbrgX7hSLN3z0CgD4v5In5
40yiPZqul0g+t3beY7qs4qnCVKKdXKmv/px6yTzRIm5MAzn5NarDGEaq4PRrn2epHzosHhJz4KayjPEg

5VHwSm11cjqJ+/HNKr6oMcrTYy2MGkD1D2e2zLc9qAAxHNN0WejYA8YQB6gA0W5AhqCFA55lG9fs+oKM

pjFfT9Lo1sjHYlztbe1vTw8tpjkhFziEYiCE4/zDSC
fk/aA30l7MaYlIdbXjZ8MMyGufPw9fn73qufVu1Raop34VSdvIuz3V8X2EcXqfx5L/Qr3oXz6my9Xibg

IJn4TPO77l9g31tBzBRp0679A/YUTHnmE0wkvPOhomJv63QpsWmMmXC3gHfBPT7gehXGUuVaQ4amIeyA

1JoQTqoe5IBvaB/b1bKlbfL6SFgISnTXmvWkLMeoyR
iem8qcRNg5sphn+2fQc5YxxOqfFbmDPnsZMzWyidtJDEaut/VaGs7TmAN6+uScVke3mpUaYtRrST34b/

CKXgOWlw00631yzVo0a446S3/BI3uiH0MZ1rBdOrs125p5jU6LRMiTitfoIBu/RY7KEudIe5iqc7Avkd

egpFabvOZeSFA91+lxSWNkGJd96VKB3PBFnGiolqI3
VXIfX98c967CGPEmTDrCnjFIPJFJQOY4ryf0oyYik3bVKthRHjEsVeYZUOEFi6DRqPqckAu/p3qcPT+0

Cb49/lLAK7Zql6J002svRHKMvF5RclirdEFED+mG7x8PcJXchGhYu3fkZw17pA81P6yvn1xJghrE2o10

1KQHh5T69O+HEHfzqD1EYxEW1XSY9bFrByCJcJ5110
PJTsDremLJPZHb22ROsm6wTCYI8ZmAtRax4Nri965+TEB/OXCAwtox+WKLVRAOLPz9Eni1Di2vKC3JsP

5Uo+ym1xNlZdH8IHjpo4Xunk7GyZxceEFX8X5NkgKR0MOgxzCWIpEf0QsB02LN4OTHFtGejau+rbXGqN

6E54yD6jW5r+jym+PqzVMOYdwedyfk1W830n2mlk8F
FNkco5EVb/QRRrRBxEx2z0HIMSKOcFjRhgyiNmr3KySEc/36p3l8Iq1m6SAEn3rPOrSV2xkbmFlTR8jj

+6oUkEDihInsyteFo/onukITtLQ5Lxsotuf132Hz8qLTJwtN2I/26BDwbKHv1xJ5rHgMzEhpjNHRwmFL

O/najaMFPkY9uSLCNlK+JA1lfl4tc4C5ReBnvlWJ//
A70CXNUW5pS52lFTF0CdyU7aF5c1F7I5phcymcqlL9Z6PmPZRGQ+QGShkbF5CX8pf+2YWybdY35YE+wf

cBWQmSNXl26QwAj8nu/WbtTzJv5Yeo1oeaqfWC+5HkEc2zi+GuJTbvPfH9EryqW57jmb2Bz5amaaEtkx

fCk/bhczmQfz9nkfXEKbaUEn34+wXvfOIqxD2gn2Cd
mMnhRgowN03wVsXPDZqs2vTVsD9DDJNIv3+uwWMo5N0bBcTZOujF+pP4q446ZE+Jaiden/UrTTUogGIDRw

gqpZXEDPuN6avH/SgWm8Z9wztH0TDQK3GEyAnOeaorohV58U6+wsqq0tuCuXeV63ZtTVTxtzSszI0tvy

Tcv3EPr8qwrYuTtAQLZ+wF3oiKjd6DkqPnhL3a01Lt
v/mpsbaX7E0hKive+6zyZLl3YCMQ8jA3dzt8xR4frXGrUGPdBFafeKq+gD66jj6jm4S9FtS+v2+VmPnq

+8ougN7Pq5iDIIm50BpW8iup/rS5H6v0ie/UesieigJNqJK5F4ldlWqlc5+P/5RvTfXSOW/amD0w5gZp

zY5YzmPTcodSGP7CxGfJjmyAuPGwDEQmpsQby1Hd2H
Z7W1Jq/8XqKlU7tWn49X4Uu9apCksj4l6xsaMnfPPZ1orBi2AA1wIJzrW/yl411kmfV5YSxJMJZAyGm2

qb2a9KMGxKqWXXTDJn3qVhPjp+DjRTk2ziGpVBlFTxGW20U+b79Z5gk4Hw5QJoj1wn5CHwgEyLp8IrUE

rT2JgrLodRhTFZHis0MoXeHQSvU2rTwsuzATzhXSvw
uxQfA34g2IZLCg15qof0YXJ5x0vx96Uc9B8yzIQCyqY7SvCkKRCtJ9d5iI8rlNgxCJsQCzFD1vGm1IGy

X28lBxn0v7BHQjW55p422+iy7cj5KsCgLySkaibAnOtC47mfV2MoBGu34pN1nY6C29RfCj2B+13qVNxN

17ro6SVfjBvl/veZfOnDWx94QOUxeh6nUt9CFMjoSu
WzfPMhka3RZ1kjDu4+SuNOVDJcotq/KqCJEtRq/p70/7QqrTSbDMtjJTZqv2/RMWwwYiZk1r6xpkM7nc

zaudx9wdY9ApT3yIXUxDPND35zw1GuKNJeoXbF0/iugncOw8iTG2ykUPbWR7tgSas1SxV6eJzqpvl9C8

FgC7nNCgSpdoDrebOplFRPxlUriU7/aQzQUhEa0nOr
8V4HfcgN18kabV0qETf3LXUegiPmobd3e6DbHQHu4o2xqy83hSSOH3THHYtmfsu3FgJvBFd1kRTF5Ub7

o7M6qR6+mXy0bZO6Lhz0DK+4eAwJrwG0fgs2c0mhjeFKdPmaxeX8DJqUf0j7X9KRWhdLWh29pHoaYva9

zOy0GxWC6N7Cuzq1vZ7JpgcX058yrDL08mtHc6qATR
7FcOLuBhf9lWO/5+sBHA+QRjiQ256ty5E7nX4BDSOY+d+LIj/9WgnhmRoD4evHtTrWRgrFrjlqebwrkB

0cHGevMDPLOG8y0Hks+8i1Ok00Gpwzc3m6f5gz1Mro+R/rTjCXVN7akkq0aWZGkG/iYyIU36ZBZhHEcd

Jb8ar32lD5d3Af+QQGdjn7CzlsvvR4aWNtl/gUX494
ddDW4Mw358nQFqDHFxgVeV5u7Yy335K/Kk+1RRCxsRD6EdqD16ZhdZ2puwO57WrHFd3vkWdlq/KwAANu

2vRg1IeV1Heqw+0XWcRXyVxJsaZWJU4ddRH0u1XEnyklPxQa9Z+b5L3AsvpZw+HZxLZLf4kl6rlz8iDj

+XVO7nWpxh66E+rdy9d+JhMTOWOynfET3vW+U7RmmI
dCReOBzcIkL7EmzNN1oj1Jtx4ZFT4JgAB3e2/gJlqabVHprOuoX1d2DflhtUUM9Q5zkB7REp1mBYMQwX

jO7YJFczFcQgghIr3qKUoOa9cueGlGjz4PVHfAO2GIvMRMkiSoXYnBlFi6e0PXGn/GBEpVxKlBSwzhQd

Q2Y/u9arpKND/wfNuOexpG5UVOUHuIx0q0lBNlY85H
x7qmr65cTKiGoaAPpYfsIbsgBYk7rEw4/2g/mCOy3Sly8Q+NU88TWqTFVg70B9STqDXoFgpvuV9A4tm/

24ZuF3tefRw9fJuK28MG7Cr4LWuLlNJX4GWbBpTRSTA4fTIKo/5QFSt3OovdwDm5n7RK1Hm8uCVP7dFW

vh22gXxqgqcMqt7irIC1W99BtQxfK4aNiS5B77W0DM
KgeLr/Zt6TB6IhgGK/armhWpGcDRpum8GS5Inrn/Aod3ylTYyt+lsjFE9WWxekpmcIprFU7rQejfnMS1

tQjlAj3U0ubOs/YFHXpKT02nPZZN9f4YEg0La+Vj4V6/TLRGxnA1R9v8M4qWWcUYeqHNvq4vERzTKPMB

z0kyVxlbRgyIv3CVECRnb7Z0A8eo8IBb+LC6fbgeqN
w95LEzgl3ZggeJcoTiGCAsDYGGq4LDzAxaKOmS+iQOqGLL49+Tfet8bo5DIv/Yq3ry6In3CEf6ENCHvs

ll8KiCUlsHQnHgRbv9XoyOcy+bvq10Pk8p1CqCMibVoI9AU4s3om2sepJbobrqcG4iHvwqVcLD95ADm/

xEz3FC5MA032JccKRQMS+0pZ7Ey6wpLW5lIJcQcZFe
5rUnVb9ClRs9wAA90C+28tlWzn5I7zsl2403SACef5l64tK4823obA5NaJGd7fzziNJP7+oEzKXeQuib

4vRBngnrS8R9piLtUeyKlMlpRkF4GxW0gLcoXDcwxNBber/x6a+/UGZiXR0iHqx/tbtq0MyQQy9J/ngy

+rV0BVRCqjMDoAwYejdj+ik46vt6jJTRDmlTfesuVL
D/7jRoEJFUd2KLzF9HDgxOmt54omyUL7yKJlQZTKWfrfNL4lcF6Vc5UgPWXx28VNqCZDipzSgnCy52zK

/UB0qGsg4RrqU5TA/W778B6Z0dRlAqFGLVAh+vfU/lrbrPDH9mrVZEpXvz197TmIkHq4bKrrkKvMYzFF

DtbvcjL7Hskh9n1TiI+7cocl1ullk7w2wGPRmRGspk
9lhqKXn77aVuSWneNg/+aqIKKoSAlSKVx+Dz9h4hkyRpe3GBYpXRYxyVAKCTp39E8U+SaKSvjlA6U2q3

gGZ0EZONnOIM6yAJxeeq9/KpFAYp21nxgnYBQHc0DE705I+BOPT73ewLErrqFc9Dl2X4sm6WHXMevyQ/

D428NvxCjGLXsopWHJB4NsSsG39/tjzXG5KYsZARpw
MHMB87x7KmMzzcb8xe3QD2spwFX4w9ftVLOJsrYX8Hv0KHe0ju/o4VSPXcvGLrPvnyEkCo2D+KI/rxbx

rSxM1ZeXkLz4MlWyQNfF5dXl5/IURwd5SDrwxeuvmdEzv+aVaRtjoUtzHbDiuZgtleXt6Vwc12HSjmMd

Z5YcjadOhdHBaHG+2f8OnBa/GNAbLfdgYPOjqOy/Fc
PD0RdERMQhgsWGQvX8tjxtbuMl+iHErOVETtxz1QMkXU7L+hfMwMpFzY2V9NCW0e4RN7A93ttKh+eily

aMUyxEFjZgCAC6gPcq5tcU63YitBVEt6/98MQEuzWss3Ib5rO7XoH2MM/WnM4+Rou516E+MTaJBNn3JA

05H6xqIK/wQdc8CSVAY9ee1Lvjm+cofz+/QD9mpbWJ
uWehcU1QEHkR/XypdgxsX7wRxiMSG9aISl+lJGVlxJjLouqe/KxLpCDzIMTs2rjQAB0S4bWREz1DC3hN

T6YTQxTDWFxbt7CHC50412bN7uiYwoygWhKHHUn4EhyI+A586iW5VKnWGBmmYxGL+TOKMdnK3ImPZSS/

qLjNIJsNCezWRVrU8dzsho2BOCA+ZlJZz/40xjV8Kz
XCd1h2oL70Sy7dg+l+XFNEjx/XJrpmeDfx2VNZ8U3zuvxJ+mKsro2WevMFgTGgn8wJyhPBgGD2kSKF9k

shC16rV+5NdBpEhf9OaS15Qa/UVPo0Lp6j7k837jveOC4QxO6aYPBq8OL1jFItz6xDCv8aTFpMME4VHk

Sg7fgJZsbXeLLrgRKfBq6uai73mKRjycySoeTFirBO
at6zV+/4aUofjWIepBccEjYM+eb6pqLp8cLbEYK1+3DUEOruulrKRRZeIEOzqTK/[place]+6lCy2TfXVkpyZ

FfJssm/TGkVMxk4vAg3RtpyvtqG7pVpEwOw62L7wbiQI8kN3317s97X2C/VVC7KmWLuchPzJ68OgUAbz

+ZeJVsFag+v5c2L/ZpnsH3i+aDJfIhJdW3S+Ef5Dz/
ROw7U0/MabPFzZVj1wf8jf1Fxf38oLdUxhRx8zyqIfpl/f/cBRVAIW1Z54QSD2hcCneCwbshg+6t9H9P

enc48WLfvKwO6CRrrIWZ7Jccb68o3FBGJ/tdg5dPc+WX/O8blKzt/GkplIiVyImfPf/O9CTAHaNXdZJv

VzxxfoepXpyQHdkxk0uCBto+I57BtaSrOzGaiEUBOg
ngY8f96YCcd517qf/l4XlmGMW8KdcY9fQwQqRROdkioTB6V+jfPamsRoN9N57tpJkl0XFT5LjAU+oG5V

WnRhZK3t1O3ght2yFoa1tD2NXgGwpJEQwkl3+C894qvE0TDpvKhVi4HM0jWiqGFaD9ZDcjusmo3DMtsH

h4aeI8r2RoSxjRrlcrlSpwLesG9fKpaxZiCAhro0GK
WoHIWX9nsfRhQEtOuZhkRvyigJwLS3IMPZuHGWbIh9HlAmrJvtsDKqMQN4E1uecUBAQpgukwanYHhRBg

LUEPx2uHUi5PYEcEw9Xtor7UHxgV7X/BHEIqfDnoEZgw4vhUGf6ClCFzMqR3WVYv/S/9H7SRJiTmL1hD

v3e+xNM6Vx3r/bP3jhXmH3Q25vt6fQCergXfEHj7DY
uhoriBrXiSumRciDL67XygE5z9dqbQqN/kOfqQ6p3lCMUq004BT5XbnQcJkNi4CP7Ojt2kkwtmtAa0nx

gVVhO+V2db74d+msvrLt0RGsMIvvt1uRqPgCVK1Nmx0UcpQTG8V5nUkxYPErSpFNj8JfMXML28/qA14E

cP49Z8rpy1f9vKAD8QFYhWnsxketsFNscJcUCd4xL/
p0fZr/UmDGU1nOW/FMkvOejNnqZpStdQrNNTPP4fn2zZf6JfVUP7fJDj9TkGi8/whcMKebI31+ImQdUB

aZQ1AJCsX1ND2H5NZ60lCt8ocd01VX31La5915dij/A1iFQhjcDD1EZjQHMZrc4FmVtz21RGsGX1MTB8

oScX443I84QCv9IXFT+xCS/msylsLb5lJeBQ2rC06W
SwSJbgdcE1KpMTxvvFTJS6XfS88IQ6B7jGKA50DIZaVQuv41ykqYuVWH7tnVXjgEcoGcwzobQhqJqTGj

fFp/SrEwWOG6oXNtuV3XGskWhxwIVFmR6+0k8MYWTd8aq+Qj7o9EM+/mvARTPaV7k8uhONAP2d8xyhP9

mSEGvbnoGJ2JT9G1w7/WZacFFdhTlQVOTr7pOWpbto
WE7HKZ+yxcVZpKi+GoNt7oRNI1vPktK4MBwFIBwq7SlxhiMSK/o+E7NCFH/t0Jigb9fdHbR0hGsQbvFG

8PINc32Y04b5pLd3LzaWfAgo/BoUBIISjTS/wy4+fE0ak4SEN54jcaNkW9a1PqKowbGe/LQh8tZ6t0it

eMda/dddOAji8VNEQNvjAjFtT16m6Y7dLtkBkOvPJF
0xrWAp2hoBWni8X/3c3O2VhJu13vZG9U4DjTuXUqn5F/mEFc6bodQGgY5fK0kTe+Cj3yGhW5F0PkM5Hv

q8yszUtOR97yKv6DQ4Tvk7k4CNTu3J5MI0SjkJpOICR+2hG3yLq1WXXUqmdltTWV0Q06J7RB7aA2XMju

vhs2sJlVnvAN6Ph3bLfujR9BuKHKrAEuEOBpTmQy7L
aKDrZwYcTePTHDK/34n8e1J9ma4rOdLqvOQ55ywiIS8cfXxFEkaQcUb9a/n7rMVKD+0QNljyzCKa3Y8m

KEIXh7Z0jwEhDCkUqJNj7lcE1Kap8oixo46vNVRm+HEU6zJNKffzXwIDQaAcbHJ5NrUyh/QQJFM2jOJL

EKFZK9DPtTK9vRSEvLrEUP0mwWaqd9L0rBHRT10Edu
oboHILFK53oOaXixys4kuGDvWqyoQqVFkx/T3JJ2P+UHLEfxy56l0itWMNdaaA3Wr6+LdtruvASH0NC6

zOjN0PO+ArGIpt56H8+WjYBsiHBUFcobYDThvEppqJnUaxhSjgJopDRupK1BhYimLidgumTpT/es1mrt

W2eru0pZKRzM/ZxEQ8PzTZXEfBNgOHqBaEHW0w1VWc
m1frZeTyBN2FHhKJ1v6JnVDJ/MmFoAFqbT8f6tKpxyNDwRXXwdgYRrXWLdxPSOeuoH9u+BDrLZjB/erR

izDO5lmichYfLKvvm7UPJHBuAVc22sameCk8tSxXFOpFlKWB7DvJ1Eaj1yTbrHDSRMP/gsN3O9vBTjqa

NKdLbo5mq8XvC26adt/4tPTUsq+/sZ/heGTNoHS+wY
4Gc9Hp2gIHosKQd1nLL+eX/kA0YDwj4jABhhY6TMUVXHOgSpxsjicsnGcQpHzuQJgeKCqJmoz+V+1+nk

zTipU8Is9lC2dO1pk8dvwwxIJJluVs79mv2nhw8fkmpdyDw44m/V0lyr97ZkIzqbYspd2RBlcm4Wybi7

+He7J32EL8i2sN323ZIoa2IkaVhhLcc3dcb5xjOdy3
62BVWdRyWJ2KHQzvCIfxhipH0Gqljg4vJ1y5+rH9cjNFLPUXfv4t5140EYoq2EaNLX9tu9l2YT9PfMwy

4GDO96ta5fwtLm12zAPW/bogvzG85GdaSynD+2FKY9xLUMWjVE5L3JaxNZh8MWbEcdLBsssb2TQezySK

1iXounlnhHT8rOiFOigkEo0/ZSP7VNfZvrCcRk9U5b
LBebiZgpKzQ+6IGMlW16RKjnXfOoGR8ghozlLbXOUZYi8p0ZcP+pN6KwDB5ZdwMi6C3GT6/NjwUQEWnK

+4aFFXB7smBQaRzsrmjC+x6J3RgPP7WEx4M1Q3c/vzq9hEZy3syMrg0tkbqPXRR6tW4maJUhFULP+ct7

bQmMUQ5vVl/KQcsA+PFiSMj5vFResetEpWN0y5vSv8
qvm1CYQyx9H8Bau+yqbN+ZFuTIW1hBXnf7N9lT1CZbGvMajxXTMO8trakYCw4UcWtjzv7SusWzyzY436

g2BdPeZBmXRRtaCchKJx/LSbGDHd0c+ZXPDEckyRRZL+wuM9NfceXXEUl9LB0JFVvT2NAOdkwbB7sF+B

ifNDt68FKjx5WiEjF2KCKsiVCIHkgzA0mpJ/Incqyx
RSTejysXvDCQcX+AFe0kX9C5yjyPIXBHPnCLSxRxdLm8fHbaiSA+uShki9V3jor6XZIOYdHZefZ69AHs

aNsVPYuXSxZ35oIlnaDWlE9+XeZxQC3ARqN4admt6myyINIwzPW1GN0/X0E33J7HSG+uMxwvy0VDdxb6

umsspIpvEYS8gydrN2/t8tkHx3fu2MrtirAFe5n4FH
vvnpcWuSuIbKNxMO/aQKT373pY83LmWgzxnR4a1UZgP7j+pIy9noDelnGuidc1kfE6WLhXiGejBKQuOg

rYuSwuvI3gQR0uTZIUVB2aC4befJPjAY5jjyxtUjumAugNpw6Hmvh70/N3/Crescencio+pyi+aMNEXhY7pAAJ

5my3oEHAR/N7yv9E/TR/lSaqasYa8gE255g3PsNGMT
[place]+kY6bg+ZsfpS67rLVw3f3eAeb+hrkNYi3TTff/OuaDLarT6myRTJPKiAsxS16Xmr26mXmc2j+ZDCN

8LXzrRbohJ6c0uKvn4tbWDg60myViHU4zKD6193dsJkpfw6IGcMh1emM2eqsa4pWL12TKEcDSsRS7AvZ

QwZtc/yDscEoyE3kXG0EUsQp4Tvzzn1YdhfTGvR4ZV
ASpm1FHZavN9TC6+CawusznCB45aulCH+ILMttocibYSug2vs4FzVZcFJJdJsDCz+8Dmbo88LlJH6pvE

FKsH/W/FF51j/I+R8CY7EgJ8Zq/tEtgoBK6qHDIcijQ5gzz/uM7/UVx3/Kc2XrHT5k9p+Q4i5yZxyifV

9WJFJwwXWqeZU+xbYQCj8dynpnQhwj7dve3vmugK5o
qffQB78k4vFeLDhp2Z44Y+jGNXDQpXZcuPM7B9mQcEHBM3yMqDfeUcfLeakr+f3hbHR3CrtOtT6jWvVB

LnW8xZr57YfjdIAZe4j+6XxDhwg1NUfOMyDqRoNsVWC1jTqyr6NG3wCoQe8aQ6sblr73+ohKA4wVdsL9

tnNg6PHlTQdETZONkUAK/fRn2Cd5Gd64muRnLGqtOB
Wh8l7DgPXoPyBPId4i58onpYcW6+HA55QPPQ776kY1fnqmfya+p+i5KI2FtP6869yWPgI+mu5gWC5ZKm

zTJwSqkjbLez6yFaUpaNad/TiptDqaQjDfa7QUSAdIOY9ZKUzcjEsjQOuj5xL0m3Od9h5tABZGzZqk/O

5QDkQwc9RQxGZCBmXQqAx+0sxoYtHy45jaPFusSyPu
ghJQRsMOlKdrcEq+rdowllj7H/vmKzgDoYH2Ow0ouFKGZTciwQvPT6nP5mw2j6A66kNnsKRPGCM2M2c5

s0kcJEKqGOqEJm9yimUZIqCAbgPPxQfOAHMjHincivYJSBZ7wzaOstDfUGF1rkl5o3UOQ12x3aOhv+qI

AnsdRG6jz1mf4XKmdXc62pEcmpyRHut9Tkei7BXEpl
FRxWDvHjQrpa1sBy7n3LlBtUEPcCyvjBxUKtS0Qf8lXnhexJWHGbd4t11Znpns4A3466L155HFl+EP/Q

4CS+i+fI/KEUeqsJ4BHGIJOmFO8in1ExSw+f/re4BTAgi4zhXlc/OGxjJbgxRC8/b4kC7vK+z3klmD5z

47S2foabW9x4Fb8myStBlJ5/6hZeIGR1wgz408nYHB
9UgQZ159HAyeI08Fbl/KZ48N4IjeLP7bKrC7cu0xXkXocyt80Sw/Jt9+SPS+nUH+GL1vDJ9JS5ED6hzG

VuH1P8tTJWLFmJ4lfJX4b8jw5hzVYwXq40LzJa2OSuHiMl/U71XZWoeA3NTCXdmoxG9UJwLLqvfC5x2k

oMT13P7j+UEa16G5SK0U/2GMxBiDxMOeILMSCGTnvT
Z+80ebJWGIgsPltf5aReOo0bLbeeXuGAivYgZXKhu/B6naD7C6N2rr1gF5eCmQMsa63CDURQUEwdXXtc

Si5ylkCUL6b6W21VNwWfPv44CftvX4x1bAjTFbvoBxPZiU+HhiHjAxylax/ZZkUNY0zn+MApaCN7kCll

+Ep4YMRHNvpjPTmyFIldD/pUs1FvkZZOeMcQnOTLSn
CQQmxLeY4q7HvUmVR0ngq+1P+MMjl3lCrMA0kZNgBvFBf0NxRpDTJlnD2FL2J1byQFtrKUs2+9CZWI+2

putaUyZ9C7rHgFL+MpMqec4tACKQ0qF7s/7RnQJXllPCHAsTm7uRIkqTbVRUljXFjDU5+G5dDJz94Qkd

13SOjBVKg05k0R9M7POp99cge3hVn0DEKqRkh7/puf
03Ha0mt+8ZS5W0uGzJAnkr6HbaQntV/oQ8eiiJxC+OZiqoiY2DHIu8K5jq2h4W8XE+iA4sxV6B23NQEF

cqejtGwgGv9FfmBzvWYKjfpu6S2ODzCwpffLfNu9SueqCIh2Ji09maPp5FfkFcAuQNikLxbPQrcf+LbC

l5IrFrGrPAYFE61o1Wd/dHyQKX+EBSJ5gL/ARF24Av
YQfHm4Djfv1R8OyBgq5ZCyqvLx9EVllOBrwlZ4W3YMa4HCrWPKIGOLQbP6uNN4Rgxqlf2aAybssO3GB3

ygEniY4pqluvFBehcszQWqjN4KZiVsfOe8Hn0mjL2KuM5nGltsVe8j9fU9Ec5UMhIbVVYkrcXVlXHHVw

eLN9LiIRxrGFOdxl74Er1ByxAvymTR8ZIXcpimt39m
lJ5zLc0yR8O6QaPcH12UjVmH8YLqFmCFs/FenhSs5M2IXrDoXtc3rXk4TY9/WlhBPWAzq7/83ICIaT80

tnHGytOuLbZAd+d9SEP1w9+Vz7YPtrZ1OnioVp0ngIY4I9AbnjNP1JAyIV5Z/LAoZcYIHct3d/2Q4Bqf

goCRiykJaCuAi54t8e78acoN/pp3rSDI85Tdb/NT0a
NPsjfTIRrpIaiAmnYe3MivYxLoS8FU4hzwFWS4Omv73gXKo+b++oJVfLRxe2fxW4zu+3lz5smqr14Rp2

Ep8gKc7EdRvx+IvK/xEfY8UmdT0dgSgCWiacLQAT7uccLjsNYD9Nn01qn0wb5pAc0InQ+cQ79fi9QqNr

7ATUFRvPKL9hZy6oXYaxgzUqJQeefGOGpYez8EU5gK
7g48jZ4c8EzDps7TvQr3AuFgSYSxMdBVUuLuXYET1ixoTycQQZ6xS+my4IsODGq7wgqQ285xvLJ1gJOr

wX2I40IO4uYV+GV+MQLmb4Y15QJjPxdt2n2kZsuyvXEpbb4rd9PHlrgDL44DT7pAw4Qn5Ai5AsSSuQWm

rpxoRd14s1WYdo1Kvdodo/Ai0U8u0YNUGpFYirkikN
HZnED3/jJdMPGV/L2GVmCjmHzFtZ6Eq5cDndS9GU3R1PvHsd1aD2ecZDHjat83M4+l6rjHS8/r1C7U2a

GXTmIMHNx52KiesK9wr8d+8zPNxYXOgGwQuD0g98GjJ+aSbRU4B3QJQ/6O0YSAKKEjHei753YUrBvOBk

foLO64ri/AhCFMS4M+HrkpEcaqeWU6JMy6hvVsINHc
2AOBzRpHhNriGtbVc2B6FBscIH7PAzpJ474c3H0bepKYam3TJMh21nc5hU3V7dEcRYrHVmKRpt0IEDTX

5uX2x13BM8rMhqRxK+vNFlw7iDnxzfJtPTZclzXnkR5FwBy0zUUnTD5A8m9fr9qqUZ5MGrSVgadvriPB

F1fjAz6jN3QIFASvDKva5JIomnb0XEUOEKsgbwr93Z
8Sz7r3W9v6FRI8XsotDXc11tPd9SYyonz5hCf6K6KQeJdc6Az4pRoQN2so+QShNLceCWt3wmc64S73LR

0fuFgeqE3s/JeE3WW+/9p1FAK48TM3C0Rx8ezp9y+yRG+Dc8OgnxAZeqTxkxjKnS9IB1SoC1qCR0Kr6Y

A6R4AcH86KERff9d6/Nk9swoLnjYvUWHuXQA1z5Y2A
zPqTsB9FgGEr6UOdnY7RkuTO1PdQLwlKX9xfYO7gw2nTJyp4C6ba5/yk3Nw3IFDyo9cJpbkCXp9WKJzn

/3h10Vdatz+KJBTkGax5ZKVhyffMbOsNFecgYmSBIM045s6J+qkdA7SW3LmsYtGrn6JC0mc/96E2lg1M

044qZvw91qY4TP6CaN7+htWnDnK1+gy4bEGSWjv34N
BJKYFAaeI6tso+dUkUDVQfKED7bENcBSxCLi3qe+hIIDUdLYGChr7YHTMCa0Z0TsdmQXAIX2DVHDOxyL

3RLgqnZGEQ8KsgeRew55iFqFylGOl3xuLWRkXKZ/r6ISJDZDDTvlnGCUvlKnLtae7p5rFpz6LL2oN0w7

WPJcJTpickiWD7ckgpOqmGLp/yXP69k2c/w0DIOlAY
mFxAKZgSbLvQxb5d/7JS7U31UsNZr8duqu3kxAcVDlyKrgLSFxjG2os9phQtV+l3SHMUDAN/CV4xzUej

quT//WWNLqsgCJsoxcwnu9MnOm3VDJYMr5ttlg2j5cqgcAUq3VaxVM5CkMTVVK9o/fHU/sQ0yn0T/Cf+

G9UpkavQNAy6p955LsetUPZ+AmiSsZBvt0Q6baYN9q
zMf6zRjFuYiFdGmngp7QZ4jFGSxmshuVaow78u5WlGuYZ47ztY336UIK0TqRoMofGvbFZn1rjrj3fDLV

ugyaW4rjCy+8RRs31UMOylFVUQsi01PHXQ5syJNT+68ZhQhPMCi5X8E+os0vJz8NA3WN4QI6cZ1g9prN

hOzA73QX3W4d+QzjRYK6SIilwy02ocWsSkFfn+ejtg
Y/6eAflpVkxzpQ2xb2od0/T09rLCJY9EuQoCxlk7Cimw89E3uJjI0GKtrdX7jGrYcJrxEn/6Rp38BC9b

4046cmgVQ3jxoTGYPA5ZvLClHmofdaX95tW0ysg0mGzNA6Ph4Y817p74I79hiAnOpKWEJ8dRztSwnWls

RyuNIBIKl3SuQggqPw2bqW+LeWtO4s/m7xcxeL/8hH
67PnNWT+0UeaZ+5Ah3Op1KoFQoHr1e7FxMQ+cB00lUbqV6pse1rqxwOiZ23G3rrrDEOrqnv7Q8ImMjX5

PTB4/qs4Azf/4ud6i5xF6D6TKKLUeqGg/iG93v4C8QEr+cg+QapxgvDqoqOvV8l+kIQjZnjKe/ppzOsw

PyDRi0u8Q0cBeXbnOsqEMEY+GtXhmj5V6G5aXinCyj
WHHfcn7Aed/sa7vBaqfsySytzViRIHEh+p7Wtl0WnXGxGTuLrT/WYXsQR0bXv7Cihu5k9w+CpLHmCr1d

uvcko62cYGfyipWewvXn+hLfyZ4JE84fUI91pFDrahXpgt4ixAQ6/+O0YUBFK3j3pAQepCDa/Wonzc1c

15I2/LNDftDzfSf+Y+n1ecKjQ557JHa0uXb/egvNM9
bXQ9rW8jEakUmWE7lac6yHa6w8QAxO7Rt89+8lWCWVW3E6LoD2Ka7E0wQjAP5+6Zw28TtlKyddfcQcIx

BaR6XxO7RM/XZeT114UDS/8qOUaKJYeWY1cwGGAmSzOPCvc0kO00tYn6wUcCeBIVX2iTWJTdiSBj8rIM

e12hsruNn0SPIKDeU9urUfEf7ndboZWXtK3235DQfk
qXKTB6Ulw0GwQCRGJ1dkpMj32jC9aGR6pRex/bzU+5qqZhlyMpK/pHdGgNlKHgd1OQrIF3/XZHMLsBJG

Bd58/Np4kqAkh3j0hhlHGUku2H046jDZh1skA50LltCVG499RrC89vQkQ0JCRjsAqHnmDTRT6x6pxzEp

N/njMIpzCofne+Nj7Ovz+1zBb3wP1a9jpX8ufFtve3
irsn01AjlJj18J/DAuiPfxrwxuo/CaH1k+jrf2VvGmQbA9IMtKgQdSmu7lKoy8LG6E6sME54g/5YanF6

CwMW7PghyFRCrmSWDg4QFwL+L6XNLv0baXl2ri0x1IaEoCZAx9JmumxPHDNtr2xcD54hI0TUGHObbTUL

SIF3azKF6E8SL3irqtVhQSubXB94Ab8KfLFv5sK5Kr
pgcdLwGS/vuELNfOCsqhn+eCuy9E7vXj0lbdBPgKWrwBOF3uf0+7aDww0hPfRgOy5jiqPQtCB3slr9uL

3OS0WhezGwS19SgOB3C/Lvg4CR3cpWxzMHJjSxG0YzhDDIvAqooeoDQwwl1W/GzJexdqqvam6UxP++2V

yNAAKYYDGmz9IC7jW02VqKVbMtqiXop5osdPEznGht
tzXO1y3v5vZh3jB4LeFc5qflNOnQsHJqXEQN1xCXOGalSAUX+FwHHWvehzs78S9VSpspA70Th4NpbJbC

ZEi9y0WvvHftMlHaTpqs0LWSe+pRNKug01O1T912A76tqL08ID3ADUfno/VLIWEk2PDM/sot8AAMY+yn

NtkEpQwORkafKAObR0QSDKkbZq+8SdRkreK+Y5ZgTW
Ck1CDGoPLPH9nFdiXqowpNv+36hO4fYaW/etpsq6M3cnYXb9AvI/vfK9/4lKW2qzWmwbs11hXdV5Gvjo

K87pLgAdSJwjAOE2FkeN1R+S5QgsjHoIm9DR7oae9zQ4ctvWuUU9adKai5ZPPkp2T0R+tS4z6PDUqlNn

1qGeslIhhwWFKlGu+4SnNsCqsNnrAykFlUJ52mwsK/
RugdeQTAiH1jNT57m8thDcXiv90lJbUUw4LckamzD7JmeL22gRngFCRsx5wP5EOxoOlPDpqNuXz+iWHn

DZf9LOXtTwKECbO4+EUU4Xpyr6mZqIiL0RNJUoM+cT3K3eDc4JBb9JUhIYDnVDIu/X6/qvuPthufp8Hs

+HLquZbQ0fkjF1lSXi32iK9AOqUhHv+cdsfHxct1dZ
Tdy8oT+4JGy/iXy3C+IA0amG6Ccak7EY0Db1fnVf4vG7/uCEM7f29FpCGgWBlh5iOfo5OAOn6wJGV52G

/0X+loEDdC8Ru6OaENPmUiD62eMl87qTqiH//xOPSbvsXtOiBm5tmltbPQHVVBtuTshjCuTfTjD5QB1V

1IZSQTzeux2vPW9scDQvSN6ur/5fpUA5oAPAP3v8v6
Cp12KnO23a/wCh/hHpRsKri6nWzDmt31Ugz6D7OucznYtR9ks2A/2drmgGEPPrlP2M0UD99Cds9X8yuU

vqvvKFUDu6AzNoq1/+6QZdFjSbS0LiLv7RceM9YpeOrRtIRGFYdRsBQyuryh+23ApoCgEfRGAMuksk2B

de3YFYiwTxw8BYPbl98rNxQvSfCOTlPoVvDaUr0DBa
gHt7aYUi5koMN9xiU27aftIkt4X2u1dlg7Z7I+XTAhB7DXfOmRUL8voyMCV54uj8wHJJ3/3o/6r7Nb+a

sNedwbXwdIEc+1WHz6aHPHqQbhoB/w8snHyuN0t6tu/9gh9qI9YxnEJrXcQFALDg+OgonL42QNhdZ1sl

zSbXn5obHLaEILpriKMkSQdLjMrOHsTg9r+Dp/tlu4
WP9Z+TFTmMkBLRPcHnkDQyuU/i16BEHuXsVlXptYJcm6IrlUaq+YiOu1QhcP/DbuPbtjR2a3kRxhXWrB

0VCGk1Qkl28JFuewr5CAS8pOklgUbNbEu2xShsrug350iPibtQVkH4l4692TqNmc0So5XarHTnr49maQ

k8C5toM6opTYmFvssLdm/ys8Xrg3RUrKf9Pp6zuP8I
V9iJdxsUWf+r10h+VvO6pE0l2GHYLWn5avxyg8fsOnEVEoTMaL5FqBrqaW9xNOFcFpIcwr3UjNoues2r

1hrngrj0zhw9d4HoOAckROIf+SDDHMvK8vGGJVlar7FsnsSqVq1rcB8BNiCi6D6jod7x8JWkr8JeMH0d

yxJcK2AHCwStC2gAm7uahz1ueLr7eSQ+BvKShJx8CR
pxOtWOEIsF9R33pgoEmtCmILD9T2SliiQyWsh0CY0dfFnD2pNqqIE1kYG3DPxcdydY+3ieNB2p2TXlbF

zYrPu+iXvA9VvkCb7ozvhAdS0pYHAyhU51LGFU3Hc0WK1DZNArz/3SmN9dA03vc2RhsyZuvBz6h7a5Kk

LyxSzRBUPUqrJjOmi0SkGojr+E8z2xwsfkj5CHpq9I
AEAUTkJGYbkj0/OiXUPtaXpWjHXWbC6EVj1M4JO0xxYgTCyYyh8kxL4JahASyiye2IcJRGElPd1z+PPI

U0qaJ+e/M80Q4Y5YEk80UH+fcaFO+uHiF6yAswTHi+3w5CWusoK6lQAEOGcaxTRqdGi6EnR5nrjAURKL

aznbSNSZDGPGb3tr4qklQ695l8Hk8ywzylWYY1el9T
YiX+SrgsmJ2ciRG3ePF7OS+NRmKnu3/AO0anUxLmXuQm9pN5Eha5Ln2jIaCst6ubFRdsSM5J94TQzDhD

Ph/F7RW24im1reo3jlsF7xFs1UGOELtE1Ym2BnXwDcmERDkyXw8X2zsdeTcPu79GcwGYaQ0K2kHorbh+

nfbVIQh+99AM8KIO08LbwtvIxpWjl++5S8tWDbwOoo
PXYQcFQ63Z8Ylfgbn/tmcKupg0VfSm6QkhUZmsm4tP/IU5doKtQbtAu9rCbmwbKj5P5nBQTmIpBOObAW

xQDhzinMksJxHC8IOEkfvNlo41rXK8jAOxSj+joWr6xMupf36NK9rbkFfQUOAHcneUqI03ltg5odYh3a

H46F/aLBa1lPQhcCflxetms+KBr/73Sp+k/7o7YNym
Kro/Mc98+NLCeMLO4NspArG0hUOe3h8dr+6zl8wj5o5cqHHz7EJrj2kRZrbdoMmbOVbqvkwxqqawumwA

HrmPw8x3KEn3rdTGH54A1HD3az0w2QBqzasON/5rX5m+QD6rDniW4c6We5G/rD1X+HInsUct+Bc2mr49

W1DIOV7kAcrxU4c4oGKI8xR0Vzrz7qJRMOe8BX1LfK
m6hltHQSMT7T/ZlAoHs+3Wu1cim5PQOy411xJeePD8GTi5JNeFxLBEqG0oAkYQsNcl0HtCiYcT5vHMsZ

ktwVJNPO7JX2DS2g6jUcPg5q+rIyidQagmw7t0o9dOtBDHhVVKL8lKlWNnb6BLH1uag/zUzsCGIzdNXH

HnvhwuA4cFsZrWJ+TPN+5HPqvH4p0LnBmo2qtMKe9J
M0/vtz/U6mvoL3Xe0xn0doOdspxdd/2dH431EHKrUZTTXXjApwYgH4+EF3n0Rkr4m+7IC6pty3RmvLiQ

5TuDR4/HttpPl/XLmIxRwSaqHRuCqnBojDhRz3saBd4pOXM93Osz6XT5jQR76qquKu1Ae642OQUrRWhc

UHSze60yDDb99rffDxUeJj1k+fjq7xCLz5OP/XbZRp
poIvLY1iT9d8p1Ot9MqciqNKQvn8p/5pjc67qKOaLp4ls9ToSuYhC/N/usunHVY68XFHhC97SiHCWQ74

QAjRxkL64xTR9XX6Hy3lqbVxlQPLcjXrJNohgqDXQnA0vWqcr3sJ3vRn16tHV5L0VMOuYztNPvplc/Ak

bK31auPeqlr0zUDiK66ZCSNbeQvrD3KYQr9/r7wFWL
en+mvMT8IMsjg+D/se2Um7cOnn2COKPaIH2G3zbCIUkzbG77TjgURDBWBy4ZYBVVYdnST4cixGPAYNC1

mHdLvbdOuQQtBSOVn5WVolnr+wD5D+qEJmYVw+3WhIp6YZS1qsAcZ7BsD+suwb14Amq26xK4eM9+vwCg

RV+4hujxJSC/9A1AKjbKRlT/afnYma5lbHnQbUOvIr
WPLlf9e+HzBGazGiXPcMeoY0nxW2ewpLtCGJORa5FOX01fERnJCVucV6xuLHETvNjaLgsLp3DDqLvjou

gV962M96hnQMhPJv9w/KPBMYh5pCM3DY9TDlOro+yBiqIQ5sQfRzzrfSWhmsYec7XW0s+f3G9OnTkZH3

9Yc1fwDPgkTW69O/Vx+O0htkQh/jA0iJuml+vBIYbe
VKAtl1ikMzEXFVBvvWN17W7Hd8D8z6km9FUauZrtustswsjhnMt1hov94gGPe4yynN4M1XdgZqgd5B7D

Dpjx0UNQMLHyqUCjiC046yAWE6A6Rkc6OQ5UIKobA2g44GuFWQMuAcapIYpZzkchn1exkjYl+Z7KLEB5

oWeEJTEdQj/kMwBTqlCJStbS8P83r6UQ1gbXhywlr+
ll7cuwySbrgKs38SyjSXIG0RnQAKveu1xH+MfLhKyD4MX0xghss7JxVLJ2cw6SwYLe9RV0OG+bfdHZcp

vNVHUCjMzteHVt7z2dvxaPyDQROxmxQIF8tjPBsVbr9sYrbmyA/eVjeZO1EglJOpzMWSWQUiindwMfNS

HRvhPOrpVB1D2XjW9f7arO37t57McBGHE0NoV5pkau
zjnju5vJPW9L3JtLkZjXDEbMO8shi7gzFvrCm5tJKeFKgegd+UbsQ3H39u6SSlcfblTcEvEaztgdhExF

uox0aOwZQUpGijyMv2ireNpXP0WNhDwVwtx5GaE7dBnsDpMEQZx78Q+SkMzxi1Ml5n6yQ2SJ0Sd7X8M0

ddSJhVrq2u2vgHrlRu48mDELkOmbWBAXJYCQeIvbxQ
Wh9DuxurkmIFYHEsYdC06Dtf9TGMwU0cJogD546z92dVNI0FBmCC28YUx9bnR7dCOZmevFGlMSc3QiQ3

QpbVoZ+E9Jxu3tcinH6wNkyY7vidjx0Pe1QWtoVhKdNgPLF817YMw43VSoGvwb8S4OPwduUlUqkkbUhs

6+MPDu6l6JtLoOaa06CzkoK+0/br8ie1wqjq4D9Ymm
/rOB29KIuV5Zec6VVA0UcSpanUjLYrflGg7RclLO45Z0NB9/l3UuVFCv5Osc18wbRvK8IJgmWjg24J5B

sdoSmi2KSyeit2/bw6jiPul3WLtWYUUmhXm4AOmetpALiwy9IMso1fVwK61FdG3BiUTY2Mp5Q+76Ripj

tNFSIoHEIhcaPhfGsZBLfIX41dNcmTJNs5h46igwp8
NA82Lke1BWdbURRxEeoIXINs4Wefn4TYxdJSjdbocKvPpsM8gPnw3w/wc27H7gYNQ+4ir6lGCtkxNyHr

/ncNum7cUzAQPvAolNTES0ZIKpMyoNCMj973P1suL0BqRafUyVeuabQblWHoDUnsBfcX0DQuMa6DPMDD

fBDqVcIfQqPx1zLER4/mUrXbxs1vNYthz2OLro0FrO
g0v1qxDjuPGKb9npib1EgW6GOZN0vxJpnxqhots878oWpkQew4Dy+nB/Hvd5Zlc5P2fS5K4jycSO6e6L

AKStUU25kX9H/B9f9gcP6/1u4Cn8KiXhmieb949iIDUb6E8ittZx8+yAelFxZnboqw1yGVYs+RgGWbxp

5EILeSabyLThR26JdmVHeBq2EwK9Puumm7NtZi0B3H
QDOmAf68kJ+L/qqAz2xGARLEdL4kZi0AiIfL5mU9MkzhbcSABOip24mDSO3azgg5zwKZa24+Nxca52gc

2Nfv5zEB3wjZyOpSx4Wt/0p+l6mnmT1TC75sQkzaFpdJYYbOPgHnm5lf74WcCowyFG/Sfc/DQtryJerV

rHqsoR/8cq+IHg3c4tPcIeBBPlTJwHvb1Y8gtLzv2b
4sVPzWvy8in24S1Y9RphmcelGgn3FFhEYNS6Ms6fVtZmtVzfQKG89edxALhE3p7zizE50A6++lglRAfh

v2lo39U1zH7+gc016X9KYDTGhZ85VJlTMc8hVsSv2SKjCYqOoRl4JtLMsq2By222OEAkCIlA3kWW1Qdu

euOCNnsEypcmYDBubzvvz0vO0N9aT9F/NUZGM2cijm
ssP5zh50LvQCvXVur9m2R+GOvl+u8WIirR6nSlRsYPM3zohWtRYOhi8GlCRgQ/RScRUtHh9dbb7oZ/3d

7jAluIQw1m4r5RRdofqWhSjS+dt7QyvYJhTPFHLpmIzn2fjv9xIKC2ERkZ5/bdRXS5xmWiDLqIlTyQBD

DdD0idZbwTC4dQDBVJ2v8mjy/qEDFU80mmz3h9T49d
Ftdu+efHnJYdQ9Zw1Rq4jRn2HPzOCBBtfzRvvaLIaVzERuiyhN3LO0zvWpGPUwVXFQDCm9W+bMeeeb+Z

22395pq05uC6zQH8c1phtNiz0s9/WsfXsmD+GhnU6sJMSLYt0ydANzlzQcpXk39UtSROUHwPvvvoba3F

lvohV1QvyIFtAcmP/EPJ3arTqJ9XGxHo0HLbyNfxmL
Z/i0bOM7aFg9nkaMP1TGXX5AnEOUNrS0+WdXKGlivIdsUImpwCYJaGEwZa/Xf+aj0xNoutqBWV+aWIju

EnKx13CEYt2o4MA2mbRLulKtDYnmtX4VKtxKdsgzFory67vMgh5CMOQ6PsKi7wjkbn34KRAard8BHurS

pwNcI30EJpbcGsrM58PVdRTKTJ97lWXZvOLckYym+n
5Nx/M7allrhPXo23I626k63EuTh42rESlNMEL5TBe3oFZjfsWZI7ZCyyZI2Y6qIVNPX6m4T74KP8LnoZ

4Yc265hIGdfCqejCxHnG058ugr1w5f02p+cBpA3wmdH56SOY3x92p0DYoqbzc3g93buNUb0xe5zYbKsM

vnGAE2yf1YfTZTjSb+vHcvLO77nKYLuC6hF6Q7uwc+
CUFjp1PcdcJ67QpJwaQhkfXMXv8rtbuOXY2GVkrZjYNNPaDI6STRu8OkI1Lg/d6qaZ8bOYeTsnPRiluh

zZEUBh7yspHh104IpSbRvj/JeO5o3nmAphdJ29sn9J+vbP/m20bbCBwa/a8vN+nxRJgY0fa2Vo3iaoqR

f/LwDUA0oG/B9bOWmX8aTV8uBVcx3gDwGliXSpQRft
sPNh8n00WDmaljgtx/jzhprXSzBd5ZP+qMeOhz9O9n9ayjbvCtrBFbVAFm+DKeEkL8J+lO+GzAcogW39

IJlQ20owwbMTXyQrbeMhtNZnx2tAiA8jufZtSVvKYDWB/T/90L8X/zLiJ4vhnM1xh9ufRlhzpiwwE6jU

L3TYu8dDRP73+Ilci3nC5IgkDSAY0ryMhg6uHGrp/J
MaPd1pIHGSngfDPjx6+xlz8Id/0VFLnQgGJjwhX0YpPr7npFOPpKe5DCvVnA4Wj8XZCCI1V+kC7lYxJW

vImlm5XtYPu57WYw2p0cgyxrh89E8b7ilGt4H2zZahW/sW9zDJb6XYwHPYE02NrwRCig1VYtoJxCu9cT

107gYc1kGz4x4X+7bL/Heu3QVnbidIoTCHzJ9mJaT0
AVSvaU8J5K6V+tnUv6PFnt2YjfcGjlopgNYLi/IRqoKphSX88c1aOG7LO8LIEvGZBDBOai/HvmnHX+tb

PmSpogJR9Bqct/C1h/lxCg2l0S0/6qUQDmbAdHY1lznD+ebPv3/x54lwhwIWEfOKQmLUsZUwoA/82Fzf

CLJjyb16+w9wyboS+ETu4bfc9nNHiCaBuqkr4vEgf/
HfV4Y3nWMIV77caFjW0EWjyaKG3QS5TGFxmOGyKj1Y1m/R13/5CYdCs5gHCuGwAkg2htGwKFIPOi6o7Z

IAtRMo6CKhqjhXbWv66FL3Y6iG4u9J/NCycpron7ZgG0WN3gQULxo2RoKY+ESLAW/oWWfwT2+W8fhCD/

CKKg/Ud/rhTJbmfMrotM5hsbNDdNAa/Y4J6MwdozMO
87d1KIhDjp6sB4KRB/EIaZxlBoasP3yc//o/P/Dm/BqLu43gKSsMQhVx+uCmHzWnG8mN0f18gj5cV/dV

9K/ofZ+quuTrQhhv8p3Yu+V56cwsR48xnjgliWBffyBXSV+MmZwzqfg6Z57/04Hzr82LR4ctZUIFkQeU

AyMU4ODmySpbYd7s/RIsr/k2/955vILQDadQZnyjiD
CPplCMct+K/J7o7+r5djCvCOw0sFSJX0yXxx2VM11IwGEq2qdX301duhU9oQZFZOcqSL4i+DsgJK8cB/

Ubb/99Mjg1Dp+P/8oQjzcV1Sj6TH1SHnhFrYTyZI2jtiSuu6ASz4GyJ1coCc06D5Tl/5FtCnjfMPDdhq

zYks6R+biwfy7PZ4tNpK9kPAbWJQ9Now2tpu/CsAVR
bQizy2QkZgolT/LS7U+ozZrUqqdEBJlvvfDQzK/7jkxEvA79S1Xy4Jh7MOc8oOeArcJIX0ptQZX4sJ3A

1nnbb/jQv7Jg6O+za1zn2cLflArBLWx23jjtqktGn0J8k5F97+ETfQ/qG80s59hLprbeOKcAHMs479/+

BsLwTyHb7bsdbL4IVwSe6wr4z6bdSrMEA4FG1QfFQZ
/Arias/iLw8zubMCrfehp7ubsAthznBU79+09Ig/+SKa8eYeRtwbaaRxnEXCOY6oPumR7US+o7rXbS/vFN

Xander/B73l+RpuLcsThwWtBGGKweijS2t0qBxcIIyYuYd1Fh4qvWZF/5+F30yGxkBUqfBt1LBDIf1Ej8vO

ID4J7I/21yVbapW0vTNQqGfMpYpTUIHdhvctzXvjrQ
9xhWDnhsQRPydwff/JaiKO4vTdHaEzscmLDNWv4vhNJtw3/2+/2n16bvuRmuctyj1R9LaoMCOZ0V+VSU

puS4FksPBGP39xfzl1bMRJAGcnQMM6hB7L/XFtPyo4KCf88HM2j2Wl0xntOUhaCvyzDx0cJzdUIi82ds

rOfYS+f49+a2KK0Jxv92Rz43xPbZVT8BjruFUBr3/l
Wk8q3zvWduw+EgJGF2eeGiGl6kJcF4C5EcnxjpgM9yE4dw4Ot015Z5S4xGFIgk5YbTNG/8AYUSQqIzuf

tnvqc6BqDlr1B0qIQcNTKg/LMViXCrcwCgDJoNcEreSW6g8itBB1E73rXMchV+Xr2CtzZSaQH4U+Gc4v

xd3Rz7MVvQbe7fZudq0YYcb54xqwtrCDFkP46AbeIc
qZNTpMuxg2uBabW1sQfVj/GBg7IQmCqNq1Zr/Is4dUBHmZKUfEQ84oXBmoPi/kLD1rX9OYzXy4S9LKjq

PbjKsGxsvkcA84eFcFJiqq02xaCbYeR3AlcfnAHwnKRDv5DzpnOn5KtUkDBYCEkQpzK1gp61Adgit6YP

Bwu2TLkj6cZ738G6lbvM0Im+ucbxNHf7PcmJ9mdWkR
n4cbZofAuvOqs9AJhHQH7rLxmwapIFnDUoNA2ZNeTjrmlHzyrEZr1MyaLm6JtiL9f0sqIjii3ot6zsRh

JWAVbXa/6WWdKcgSFZwra7YVMrIreO2vlkv+RAPqc8WuFU5Kb6se2JHonMAUgS0FjGMmcfSbOzJYeik5

agXCt/cB1PxVxXVUtUiylybiw/wclV6lPBxjgNSaAj
c1FqyluC67kOFy8+JvJJSZlKZCxzrnmC9CPhohbsjE5v8pfBUAxrmWO/og00H4Rn7oBWvKEEgd9sz7zs

Vhqf7nEJuCoPeYMq/Eg0/MlRfzCU4XG9UuPQGtrc30X1Q956tjyHL/aJfWsJtjUH5pYT6X/cW8cwXAWi

ck8nv/Qwjxza69iB84Ykt0OCT/5phKl4eAKW90U3oD
FtiTvcEL9IT0pGv15n4YzuryvBxSE6RiiKXsL/3205Hzhxo+QC69wrJZCAAE/JBEYZ2vJzUwEYPrEi+4

FtA39LFdcXTEaB2q4G8186SCZWy5E2XzOlc87guxovXCDsS9GzI5jQkSbSCrzx8PqhgDthDa7C7BwYIz

0bshzhbwNGa42HC4i+MhrqDTvsFLYWLwWeRNk1Bbj8
r+ZSMLo2NFB89nPXk1o2sPOjFVj5SSc2gvoJwmUqkp2WWW3dySsgAz5VzCvA8QMWApyonjQUxPr4gqkP

DreLaiYdFYMnE1Yz6eQcan3Z6iv7oc6iUwzapiyH7FbFRThXAXpu2hxgOk2Z9rvq/M1XGSM8kEr+0Kp3

8I+tSfDzPD4EKzfY1fmOp7o2BlKUVeXBT19CePByz+
2GqtyIixaAKtrYuultDnYUTRAl1RvdYfu+gUOAhhetBmKaJpLO5qtg2TGQhR0IUqZiBpkb1LLPQ7zhlE

nNJptyBoK1xOaCSuZY4N1LGdnCan6/NY2rQgo3k6G7frOZ5qIKfOAZb+xkRgoIiZHB8vm5w8JLjEyoCG

pO0F3oAZDYwwO6KqN0kZsID3hquUtg4eNu92+4lFcY
a+m94FFClfxWIamwLKxYT3L7gw+tIj0vjzyJ9L9214UpgBE4BTNOc1uHF3m/iDEchl50I0TSH5TdON+K

lqj6ftLgoYZPUeT98iNugmB2w9c8wWmVyf3rkMoIPqipPXaqmn3FW8HeVWCZjlDBqnWXVUTkMfEwnqm/

3+blXtEhzfaUJ0OoU7vXrvR2FH7qLGbhDUejgbvumw
/GVfpBtnOoMcSTxuLuX2PHdhAT7xX+EocnRzqDcCBTDUuX1CtLbJ5ZmfvXy5JS+8v2a9kbdbmvQIYMtV

GkqqLY9oB2myuwUUkxjmhkgeRhfAXhkUQtgh6Av1FGwGBLgcXRYEgD1B8tbjXRve9NEfd9G4l8rTRZl4

7WOo9Y54X4W28CWBiQIdOxFupQsY1I+vPx+5TF5raB
5Y+sIzl3VwstlRtySA76g1vBEic6JW6W27qt/qCynyv32zFY6273qdaHJ9QH7Ulwjo63QR6pErD4Nn8F

WVCMDP0EOMvzEVbj6jvbpXsMOcnxJeXkP9C0sUa7lBo2P9AzRcCX1u7JLUmP9vXzuHpheLIiDY36VJOs

EV8+Y9hw+eTqfTIEl2A3cghH7FE1NFm1IUVishz1EC
kgc+BN4Wz7IJnFT5GX1drwNHWse+XW1uaKuTTl3ePGUltJFLeXb5ZecHO/87bchVZP7bEhidNCvdNyoi

njdGoe+JrTLsLldhG5hkAmIYjfNCan/2Dt9wNss3A+bV0O1cghJWJOWzU4W9/5zs3K+fn38p9kq5I6iy

bjds7q9mGluXMDO82bEFagfNWBn/+RQyrspIAeypPl
7da2birrNSsv7Njk37zdFBI2CD+BwjMO06fSUnoiH/u0NxQ258shPHsVSIwUR32Yt6AiRhgxeWzhIutn

8QIjs8G6Sz3ZeDufH7WWn9o2fG3XILgmZPcplU+31+PvqCRji2blRyNFNF5anP5P5ph/PmwhDg2Y8AxJ

8emAhrf8CpiF5S5hNJC2VWGRG1/VjrYaAOajMKtfIC
/eSQ3LOGF263jczU0sokLBal4RIzk9MO+38xXZNuOCBNDyf56ygbtvY4gLG/7nikRjxVoWqhuNlMZNXX

UBwsftSTfEMw5G9qRaV0dam/wuE6rXky7kgb79SopW2aiynHBDWvciGANxOpQAOJIc1m3mZHf6fvppOC

rXZLsscl5VcJkDymOd2j6oGb/llJNpNUkxY1BXc9HI
3zZS30rAVIxI0q0WTwTxt4zA65OwDw0zAfMA9tao7MjWQahzsdBQWaH+nHnwIMp84LtyAe/xxEKzqyxy

bONy2lLiDyFEiYyD7k3Te5C+iXxLYByI1G1bGWshoxLHSBRoNN/RIonPkSLfHA6eHnsGR7YfuzIiwp43

dwYKzVpqUksKb4PwXyyoBSxEtM0KEEocMpheanUM2D
kLYEIn8BssUU9V5S7PHXUXn9Icbc+ak81UD+4s78l+Q3wt9CWC5Dj6/wCMW14DLlnonozr0Uzm5kr596

K+6rKs2XMa3Qb4R9Vg+FNF61ombJeV6+YUFk6ETJRaThJC0wiZClPBeGRwWLWboNIjJ6u18yvMUIqgDv

zdlL7wPm7ah6BZbgCRBrsg3Sj2mggUbOXMczTkgD9x
Ou3gT5PE6ABx9YGLMf+Pv1Ir0nFeVrCGsXNIwrgnbhjzKWlSci9LgMG0h2BinhCWyWrcUdaiIkbJai12

bYLV2vcisn9jAesKfE60d5F7pmT6dStuoz9tPcFQZQsdnRc3GObUnoGCavnx9KG4CsPAB7f4vvdWvpr8

baaGpBO+iwYdSuy74Z36q79HSn/BbwLtvdzBsZpw/v
doA69CTUaVMLhLPXR6K+LyX7YvVUVaCWolXxyZYw6UqVuYM3g0oN/JjMUtORvdBQpZ+tcqWsxDVp8IGq

LmaZSsTuibgc5bjB88aCbodNgnn49yu/NOPYCCoKSbl5lgCnYgn+yr8jQTyuop0yf/RLFvx1pKlK2Pd/

NtnHV6x3ZxV4sn31iqmHsSS9q5BULEJckr9FjobTy3
ZKPCX3YguIU/yGMpK8A0KKgsRfQUzSnb4QRAM1xo96VoemJ7D6v55LyMBOTXtQcxcCZ5pjetRrkc0AIH

wocbgxFzqe5VpxRQllAglfI4HLhSZYRGxa+9TVuHgup/lSRKxVZXDFXdfmDReeZeVetAvvqRRSFK+Fx3

DF/EPNQoLXs5BmulajxV83y4nBzzU19pvu2ucSCPgP
Wzs4ux5juLEdlOOEL2JmMK8tcPghMmPA/5HG+TmFYeCVPRms/OT8g3EDocIDC1GUdRBnRF4eWf5oc72o

8Pus13c3+X2z0IPIhtCDwz18bzjBq+dMMTAkn/6z6GE0MNBRp4xH12dURWxWk3cwH3oteDozEi6fPGrf

ZkYSoRE+qby1MStuqAry4/fAwehMhMdTTQewcO/5/H
IC5ezO53gPkomsH6z1COh+SVx4stC9dfYffyLzm4CdlYp5aSe87lCmFv+nTyvjI2rwuq+En2/5/Oh3o3

3lH3+6LU/3FyNhkRahti44N/E7fmXQP87LlfeIRn5MWm2y3AD3nJoS18Ye/dmP46/tOoDwiJ+DbIMI4l

Ybyr2DylEt16RdTbU7LUoJOgQGZUSuOiugWtdbv2dZ
zUY1Ebid5kivxmx9jyq3jlmxIDBU4UjbEwKZvFrnvfpqytrRf+MlOoQkXELSeecM7rx2geaWVdoE6V36

8FM9wrrPDOYRRQrqT6Jo0I8OVSMPq2TxslWh0IerXfGT+FoTnCXsR+TLnSzLC7mM8dJfo2YpPhoFiDa2

ML1mp2X4Zl37ylg/lDNxcoHlB+NgDtJiZtg0m3Sj4G
tjMWZYH5QtwCMv+0aehl+YiabsQWE5TrXu2goUjATUhYjW0XCavwuD5Ht6V25+azJiFYhrL09KQ4DDmg

j0vuBY6xO23uPhZHramWHT0iBBYpkKew+s9KAAllIzd9Zon9GK+RBmu03hm7kolbhp7ot71BkPQKMHYY

8k+T3BbtPCci4rqFDrWu0ribCgIhUhaNJ9Wycu8OkF
y6h7+ZlxURWGiL2XbENuw/PSMd5YgiEybG/XeQwmj29immNlvObzuN4ykUKTeTyOTGJlj0+z4c/grGt+

ReeQ5eyPJoS2J1G2m0oy59eCzz3D1r6fuakNwHJcpkLEg3Us4zvwZJHRHsqS2nkV+5ttukBnHHs6gTz3

bTqmMNEF+ifV9M+vo5iN0OGiuqDvKyC8ZEu25cnCpg
XOxcsj3QeRAm55YbeYOxeviw5j7Dc1MdhNVFG7Fy9J+6pxWMsLJM2q6U4zl5vYYTZA2lNB6u9YGKhC+t

LuEmdcvFY82xiZGsg2GiLlaXCQ915ASY+G2IagYrxDr3cOBq5N5IClIgqa8aFmejjBJfX1jgag8YD9oV

yWVR34iyej+hlofic8Eur2nQccKH3PHzVPgYcUDo40
XJCaFNr/XufJK/dYK/T1GrU1FyZJpf3dcaR/uV6RAa6Hqtx6GPSA+/VxFdeNskFXE+Nuuc0IslT+4/3x

9NYoHnXGciWrqXYRcNMfec2SjLw3wUhl3vu7WEcMCYk8Z1Dbzqg2ZzuvlPfbr1WUVNi2SqH5KfF2JLM9

9HCjhIOutsnb75kECLg+SBHpis6wcza0dEHFQA0oV8
JFXRZzwbvrsrqdTRLD9gq0nll1mdaEhYAXJrzOUKF88UgXd46DLi16y77TnInd3LsxesmgvxyYQ1k6Zs

SS3Izlt+YOj4t8cUnU/ri7t16C0XTHfdEVqFgCmuKVd+UpQQ92n8oGpZuFNLxjJCNGBZ342rdc4GN1j4

2rvbMXEyhcGezYf6hJNvPfqcgFx5bdU6uAcV/lmiWq
aeuYiLdQTkgQ/R/rfuHqsJuD0SuoWiC8YM+W8oAw0sZyQb2SDXayNcZGBpsUb+/p4OzGbbthRKsQE9Vm

kNV0bS+LH0poXylWTlPXoZLBbYyV6/BreNDbrx7ZMjfRT7BdRtew0EQ4q+1roFFOhIkZb/jUQ/7l+WUr

nRZ1ceF7/irlTCE0VgGkw2tNg736n70sxieSjAf2U2
3oF+l8pbnoKFJPpVLuhZ0DP63272Y8tGLuMRSE26oss/nsXHCNLEEeUwQ2V3b/OFkXLvJuUge+TyWkdu

pM5zWz9RoEVzGKYNTlgiFWgtMTUczQGKJEFjz6B9Fogw7eN2l/n3Za8ouvnkwG5cAifbMTIwtsNN/3E/

YbtDR+Oztc7GM7XWR8rD7Q0PHCfhUfTakdDuYvpn3/
gNbsLQtSCz1P4Mj6d++nunx+bIHdTQ3JQb1M25zdfRNdZWccDaDfzsewlVRn7RgbNU2KahnBGmpPtJn+

47kQKzHYzCjBQpbny/+6/UvHuq0NaOBRS1DrCUk1+Iyj/dD5qyDdRE1cCMjd3FnrebihuYOyeIj1Js+E

/MED9VxKr0F8HcQb+yqCZFYGmkKD5ySNjOIfrBnu7Q
DAy9AcVtey+/o/0DsYo0Q9chWc0O0D/wBpvIrbZBM0ic6KGZAlUpqNFi6yzY+oLWTetJem+0adVN+7fo

7+TOcqouTtV6pddrexyIkgHEP1zquWNj3vRDSAr0S6ImPKE4RxHUa7wOq0ExDCEhoTMnn3tlWBZuaBK+

PyeKTXyjiYYaVVg4uqIKF3p0AN+xO5dRAT3DS778YB
I/Cu5DhwhHtdq+1sTSDpGiMPYjlkmQJtkrQDa/Ijeqso8pZyRBuuRrAmB04h/nIMj6BdCu7cOeOW

D19WrAaiK3OErPaQqwxaQOV00HYRgYEl5yo9MUOOY9GXxh1r2nnhwCv/6+EwfTVjOo6LI6E4nFHlh1LR

2lAGfBBA138tTBUIY/0deE+ZE8WOYqlimT+LHuy9Q6
gWy+bwNBVtcBiCBg6cYjDA9ehZNX+SthTAPugiujfk5kw3DBvhqMDmBV1cWxepdeUl6hWkGcJ/fqlucc

xMw1GpNSPzsUrgAkq0yYS/ZfGqnqXCSdevXnfFJnceHGBnWqHGKQUiZRQLr1+gongqbDMS7A+aNY4cGj

pTMjVSS/XZDoaED8dLtnTxK3JfWHWe8jP83lrckr86
sx1+8EhsgGmlrVswQDtgEr1WU+j8vuF8aGWWdaGObOZJ0ORpSEkbjofwxZWgde2b2G9bX+7PuzoFt9l3

OAnbLVehDIxQ2yHzJJqZjJQSjnAQOzW6znRpS+2qhODpet2smuMw6gX4TCKat+SAH1CVXawnKuBRFqEw

ysenooIdyfVh279MW8ZZ3okCPLq5c1PABhaXVbD+lA
69lVC8FEyShy5kVMdWfF82mC3X9yHLGtigeE1RMZWK2r1xTiMmKF+DiR0jtExCiS8M6c4xCD5+vvW4Nr

6nGV3CYF1k5gLo4O/Hx6SnjkdQK7N4ii3soXwA3jYYz7uscbD/B7X1vo7eJh/uZxHjg7HqDljWtarWg9

Ldse5xhVSaNYPKXL0W7xrvbZmlvlWp9ggblpYaThAs
JfALNdzVM2NEVg4yJd3TSxmeBckfVHG823a7n8+JH4V/7NT8O8iHLbZ5qaBFtkDYtDTFp8ZmfnwTZk7Q

/CkYTmU6Z3rvX0s6dO0m+hRdvrtn19XOrIrHOpSmL9TjS4BIDYYrMKQOOlEYAtjYwbXvYK4Y8VuVIEWk

YjVokZlI0PyPyh/j07pva6qEu37PnjxlbpdEdapBmH
lrLOhLG3s2cL7l6+a8CzC3KArL0E5cerr/8d/wXhgPO2/GAz0dg5ZLlBfJcaihwS5yJVoSm3GxJKdbkD

wS4G+4EnRmQ1+s5d3L0ydKFxSqCMtEDq559DqNopuy83+7xIUSaONnX1WAtQ8fyyyg6ouIFfi/BlwYH2

vdZsZ04l6tNCc5T4JTK3TcmlNHm08EQt7cR0zAUE/G
Gqg2czQbXyMJfwyFRaWfOIxDmTRA6oQ7LLtN/TLsn0RfdcQKi6+03np6L0CgSGQ467l9QiEh10EBoR3h

KOmncoMu7wXwS46qdp5COev9d0zcDcQxt78hSYjYXaJVz8iLAf2YWSA/VPfij5u2ESpf8WL2cFeWAnBW

Z1c8a+HeOf2CZ4vcQbXG5un/SnBOUuzVRGTBoc6a7X
3bvNkvjMSoGvHaEPJu4qC3lZQ23V0FpyRVF2YXo4HPhLJeSsFofqW3rdCj1U0RdmhoHPzhMvAUnQD99o

x9nwzL9IzbzkaGkbrODOQcpO73tG6aNWpyIFxuKXtMjkYdab7CIAoKv3BXoXPzAYLutlfnoWrONQ+o6b

CiyIOkGQiPctgzLVRbdiMiWSieiDc5kryCs0CykYHn
AChOPmiaAmg1FSpYIjRm6PPQJk+8he0Ztlh89ic4R5y77mFscKGZUO5wov5pMONYjHoTwp7A62aFmtVs

Sda0KrdyOMFTw4PgqnGE1g40PnwCYoKRIQfd6UqFlsBiSoTbl+8hYReWvRKkh6ILYqiAIsHd8O1VCirf

o7hdmb/e+Wo0gbZcyK3cGlleYR1Fs3TzKP/DhuBTgW
s+rY5OG6RAC5WbSsbtyUwXVn9pRI+qjfrNdd+UlfDULZIdRbN9o10Kq/dFsjFbI/FaETNbzzilS6spfV

IaJB+VYdk6M7ltsZ9hHDqLLyPaqYKTMZZs6aCi6IMMndIZaiXGuc/3N/qaGCNY6CkdDHmMutOGqVejlP

8qi/K6RpE3e2dLvEud7n0ILMwkouUF58f7ToLsiphI
a/AA8f226ZGR41Z6jzGAgfgOTN/MuKHhe8og3w6GuDOUAS7PMzzPzCDQW+2w8HLAbLJ8cqvPqB3FcnSP

SxI51VJlv+I8nbT7UE0z16YPYn44OCB7oXIpBxBPLgGccHMlE/mH2PQAlpxsv3orNcjd3agQXmAfj6bJ

j84zaNX8SIz0/z0hvhm6jioHwJTxFM2SpJbIlTtdHg
UvMPPVXqFDsiJE1ZbjjThr/OwEL22acZY5MWYw7LZyQ+4Fz0CBFhJUGPFbNG/DNkZKzxQ9Q74TDrvQs0

j/Kc4ECYc9/Y3ZmJ72GFJKX1VlSjxSE3QTccppUOfbED68rhPhRdcNpT1SDh8lVtU26LOaWhafHdBIqm

wOaCYPO/lORc+Bexby88DlDn0tJDUfJNrq8nGTBlip
03XKQGjLv8dV/cnexHqACMRfKGGuMC0t2bhfK+Qy1BiWYrjky7ddfg2xiPrZLSLPLe1D2+8uTk/WgF/M

e+AFsSGVfiuHl3drDltUlPmWcdN0hu7Gbxz+8DNMM5NsAaEfhvXzU+X7IYNlqajBy1TpKzlLO/GBfGKl

Fgk80LQn9gqoOePXQMZoDKn9a09G8AN1w9qmF0ZAqU
KHTH8WtG7RB1bCzui+oyDgBPJnXpPAzsireWhyXrBFlpBKQ3xZp+odu2xJCt189K6QUOI+SK73LD0GVm

pTZT1R7fp4VO/Bi/ZecKVMIADXzQ+78iCB7K8boarvyrNf+zZDBt1KLtfKygY7U05zxOBZWyA936kiR0

VYe6I0F+ebx0TcIqViFQREE0yL78vD9DcP22UuUXwg
ksDJOvapQbHfmt/Bb0fnYrg5MonUgwpQg6/6N3iFxsbSg9D049dSrYFT32sFXMf0QfLOZs9jYXkiXNxP

G6BUEJkrGP15qXr+3Xg9HdZYlhO7hsDcGuSvu34rwVHjKzmqVI3mN0QIZ8bHMJw5mR3OEKmO419Y6/bn

nWU1H/acTerRT6den+jtL5BhxqKHe0ggWVTn+dbgSe
DWt1D7Kpk7qnBttvBe1d4wppZ1sQ3aG1JfN3OlX9gxPKzS8ojdgXC10FkLHbrPvEHii4VtyguHOqnscm

DaLfvz22NChpPW7GRrZyuGQ0WguBodFmKdKB2kTKHAltSXEq+8jtqAbie0wzw9i1jFwqA0A7ADjn/ny3

RbGlie38dzXzpdjE9Ivuf7Woe59roKnUZ5VA3JDjIq
r1QJ17DmH3DOe9plNV20NyRHFBL4zhfuf6+0/BvrY4nq0ug6pJ/YwLmVvjIZonCVSwSn2mubD+AFWg6G

sq2WeBG7Piw0L6FtIJIzo/+mqRHq9lwUyG2hv9fPVXFJuBSp8qrnM41r8+TdLppO1eFG+AHPM/1OPVYL

KU5qKK4708WiKtnNdTEE+ucdlzQC/LDlbJLmUqQ8QF
NHlA9TVX8a7mDc9P85KL/NDTEKHowFlvAs0IoI+Ed3mGWmx59RLCyY694QYu/ppNwM7+r5egDfRz6eJ1

0CPSxnJA6cCvXRaTEl9Ec9Sca8n9Hns/2jpVTCV8jhqur+Z39jBEasvFi+uq+WeTFMOs8qFvKyvfsqyz

s8Lvm3yqE6ek2mKIOL/n3vLnEtoXtNb+oVt6PB1sVr
/JptTfuaxLTSxPxDWxCiE/8WGjxp0NDygMWa/XNEudHL9B3eb4UbIGdApMZ7PdVnw2sN0c/irGK1nZeX

iAlIDvgntd39nYRNh4wGx5FV2ns/YOsnKjbnZAUJe2TJM+vNkUzNrO+dVk4Vl21xjERa1FVlK7iEdmFk

q3ydx5roTbZw8iXTf/hT0T4Ry6rJ5CgXoVvw7TtzRE
DaNtRZf4t9IZHx1J5jy0QPPOMCa3F7nSHb26IFqPimzweXTf/4Fk1kS5+yKos8eOvdNltPocM6sLcg+B

LyqE+b1G2ZyxNGxm0WOtqezosdA4XrD85gP8BLEISjlN6JhXr0Al/7oqRAK3S8XZ86NPxvlf1Uw0dA4c

z4hih0E8GtTi/nQtFSSxyW3cmF+QVpjbAIBVXZbWO9
Fs8KsPwMvhbIUMvU47bR/hzZb0CiunY/WP8yfuod1j8LSqkIWWKODidH5lYDbrgjPRLg6bQF61r9xawU

YOKfImXeh5kN708r5xwqg7lUHTkcA9B7ohFSjrzIBVdwfO9GSOJaDPFQFtq/RLSIr2UFbrTGpMwLejZq

fXNwa0ELwHt6Qg5/vYOL0PiCT81Tm2wwU+y9ASiNWQ
ce+wXMToX3312Gy+bBKN2XQlrin+fOREQQijkRjBjXssW8852oD+3p/S2+l0fTLi7CFKsmdl9co9heuS

U6T2ZYrvDL2LDAr938z0zA8QQM++xBDjk6/CUG84b4bYihZIwAs/sLYqyZPExpGdX84NHi162mmiFJ9Y

aNc1TU5KaLHQanMhkWyhFFWkov6lYXRrXBcFuygCj+
u32oQQY5Pvv5tCfTMFC7Afkqn4OQrAP2mt4fpINQFc7HLYwcMgVlI/ZHh9JQo7Yjsq/eWsfMp3yLSxyo

O67Yes8xu5Ndp1cZm4m4hVnHTsINEYKeeirJ8uQg7lcM8RZ5ts9rEcB5cl4wLmlHvA8d0j6Ie1yIhX+H

IF5N+KVTw6y6WZimUx4PEhHkZtnYtBfrAW/gk8DLK+
7Lo8qUht57I1b9v7ASWf80uBIltio1Ru54qN04Vln7rjI6Ukp1bTZ8/YFbWgCSNQSEApuumE7hrPzPOH

jBUlAiZYwGhHENZMT+Afgy1GWMjshbQJRAX9hGpof97BVba8K7MM8eyt8k2iQSeUTZpWbOtshOenCq3p

zXH/obwmeGu50Ov/laDMDzdTMDgWDU6MGcvqzU/jbO
/hp4Ts4s6Ot/EVriOwpeGSyWaSuitbPW3jmH/sO/YbiLnDv6Sp+Amc4X4DzA6e+DcnU74WNBtERq6td4

JWPPcgJUSXGcrjd10wIdnwvAUBffMRx8bZ88ixevRpfWlDUAZm5A7tbeiyR72X2bNPzES3WT1cumU/GM

vpGoBFdfzq6G8gRnnPfLe277+UkgKerBx5eXsSzmFK
P5A2kXKeeyS4yJU138l0HDUiTk+ZjV73rmtamnYJNN1x3Rsjd12G5QFa2nnpLtatiR17sqGspJaYZt/I

CWLqO7tncwY6Wq6k5KirWD2IMDmhKBeudv7UttF33i3RimrpW4rsz9UgwEX+ksSQz6OAKZJd1JF7F7cB

elhHyEJrJZdIV0/WUZeniR8GC1hSFwaqE/0ggTDXfu
htUprOiTFeYR08z2JdQa9h4OFxOpCDfdQMl0F34jsbrC2EKc9C3InBjfgaUDeATAjuwBzrRuUWx+a20g

2Gepc6c0vf2WJZ37KvoRUls33d+gvhqNOQ6FrX1fhe9mBqtiMsZRGBaQElJC/FZhEmT0U46xEA1Vcl4r

1rCW+s8cI7fiM8AU8OMO1KBpyOFccNY+ekIO4OFZZ+
1OaKmY/FEODkWp4rrR0w5B+Q00j6pmsUTdUr6dysDVQVebFA91Ol4TwLbscKNcgq9P5YH1cbHitRqteH

vV3LmX3QpqW3r2mjbD3YdryIWN6Q6ji7m2omupmKiu+9v64NiIL0DU45JIFY8rAVWNl0V3TtT1tPYWQZ

mye7xJWeaNZcXN4u2icJtVkiscUOuLZlsc9dPirorW
S9Lc71epF0q9NL6QdBgh8r2LrcsCoulyU5jq4HVZ0tHUOHQhHgNaBIJD+scgOPzKVqPjZizAUV/axZYd

36tXoqwZFTgjNUJcoTXhP2zjodLsd7gGnZJahigHCRwAKRa9kVKQEatf6XKMyzdepcYlMxjlnRLv16sK

EISqCTP9JTsprJZtrOC6hVDOnOvCZ6H1m5Ji4xCcT1
OIqGKroFysn2quZ2dZNTz/ElbQwha3asRgUf2rKI3wtLDsGfbaBf4UJdchW7xjWm4OsmkuS/jztodxY7

DxOm7r4pxwulhl3FskFOepIMFBCjgKw6JgW3yNKTeQvpklC6BAOvYTTRF4iSn3Bi2erbg3sHzUXw/mob

SbcYSFfln4WdHJjxst/qFcd71AbTGmz3zp862vy/VA
S2SiBz2i1T+Mu8/cMnuY7/brofxIWYiLyZPyVn5rkv0ClNsBBa1dnogE7psxTgZ+JAZgI1qKce72S6q4

3WrFkzYIS7XiEZYBpEknOIZD+qTeRyB/FchzlaVcVVHAGppXtA3ZawzNV7wLjKP7sh3f0GT0l1aXsNzv

qkTJ1g+ZGaJzwHtB3kvHol/K1sJFKScSmsz8rHV5Ol
s9W71x0OTTvl8NY++/fUNXpD/ErZyADmD2bBTZPpjNuAvT4U4EmLhDbmXmO/ZONogR4dQ/6L5G4+YjRr

3Fdnw3zdHBEaOADiCO8a32a7vCXjeQt+6vsSWy8bJwxIG5HUGEO05JZHD3UPIi8zCfeqJEZDD95cwx1z

SxhBuJ5YVaV9aeRu+psfNk/RWfDQ68mLyBl1N6wuDx
A1M6yshuji3zekkdnJ0/C64vstotMOy2sOxRRaUrY66bRqUVKM/908xvyKkvHw/5ZRe3qV25Xo+6S8qy

6SAL9DycM+1jV2159AdfpKc8tzLFlB2f2r7t4Rp7L/aCnfIj73C7TiG9m3qohO4nnctnstkcLKdldznn

GNiPniMO5FGOyd1dl/0f2roHud7Ji4nS3fQaTQ578m
y5OlnAQqswLnPCU0nYxIGeMLNra2kb/ZNJcTJFpQrn39VF7sljZMJcPr1dQvWKdboG39Q2RLM3Ddod45

pVct7rKScyhy2s4Vh7lF5CplnLVSPiMwN3drBYQrysBvsRtpoiwelISKptkV1QDK4rV3/hmxnLUZoJNC

3mIVIc438G3ximNLjb9oPnAgIBs0KcChVvcJkxZ0zv
ttJrYxB0o8xGVfUgNHJiwP7/dpkNk31OKRpjuZlYH9yxjzk4EpIYroOiQ8lJ+4YGpZTkfwsgWXuJPo+u

PIhxIQv4eqsGE3Rlr+8KIbFCzAh0bjVTUaWjv6O0tl5lCXFs9zFSCwBFar/5fBac+yr0xKHiAF+EiT0X

abOgjkl8BewEBHhSe4R3Y5WVfq/btIInmbF8Bt+0CB
cZeXPgArrQ5joKA14skfXxAEbfbQHltMzCv20VgjGP/p86g60In3UUZKK3Kuo3YhpMEWGaOyiwDFF5v6

qjzTrpHddsSYzOfeRWHCXuOOV8GfRVBLWlkIq21CYB5XpVZEc3BIqzIf6Q4IwcJ2WVRT3A+Z7Dr5jk7t

68TNQiV/JGNjm475TMk2mfeeAv4tyZxFfQVOAG7iCp
1AJ+rellu7V/9Z4Vo/U8ORaiPwYWI6H34W8+qFmAOQt9fTIEiLIAmg0PAI4BamnT5D3fltFXHCvCkGm0

Qb5hkt+klaNdZ6Jodj47mvuSloIsba3vJL/Ozl5yrvCl0Hiz7c5AZbr25yQQC9D6mM4uOdQYTBxahdD1

KcYacCooddUTxFvxjMYdnTV18kE8OeA3cmR/lv4OUv
qaNYmsGHKBBboj7VTT2m4Wip+eH85UQgkay21/gyVHQKB1WPTuiMAEsM+njx5Rjl+ZE5a4PnVf7MCtyx

hG1bp4RdkGBmp66aR2aojTfvhz7S+5mK3HU3VWtb60QRJTYDzSnqaGUbZviK0+YWZCudtrqvrTAX9YnO

[place]/L2lrh0wV7tT6EXT+2qAqZeQnevdSLA4biyHPxf
/mxCzwYaG+deW48affSM8bxq0UanbjmSv7I3URYP4vO9Ljzi3BcQV2pFnRiN0uSnj29L9DNzCFCFlwCr

KN+TXxxgMX29QuN4V6BHK+Gy9MxH4oO/KG4G2GuKpdSJnD4SYz8xzIRKdz86rRxxKF+a6rM5mWwjQAPd

gAGUYE7aKOMTZqVKs2s0V8NTN/Pa+CxwnnlcoPNuHn
aTul85GAN6y6ahbAckh/KXjXStnx+l2nmiT9gtOMeLzprAk6PZ04g8Sd0XU167/Vydg2x710R0K4Ykw5

uiF8QH5uA6W2tk3+b8/qH4f8JtdKVL3jWqqYeryWYKv/B0p+I+5IjM6EDSLkYiwpHpuUahA5ApAyK2ce

6pDajJhDl/Uuz5cntul8EUFc99gCmV8V6j0yEvtVOd
B3q82izHGnAxblPHKE2aWza3luFbVzvpEtiPA9g507r7Kr6WEJ9Kf0W4RUqIpTiflGhBEFEDiIB43C0w

+6x9G0TEd5OJLWyRh6Lz/UOGcts0DJFv/TYZZgmplLTvDD8kId/YD02ub49Ne/33mgKh7kDLQm4Xg0VA

Va7jvkCOLzR9wg7yOUul8kRPJlSEJDmwKZePoHBtBg
uj7Kdcd6dujcD5tNnyZ7fLf2bD7soyBYUA+C2sCv3Ku/87o482YaCj9rgTF2NPzZTcXU2qR5JnF22apR

FcLK2aphldv3Iig2NnNRXlJOtoSrh1kCRWc+dlrBGTm4BGSjggC/WOr9v6HYcF2+C79I5yEMpoW0K7s9

SUmDCvRpOtGyHsz341BKMu/6a19zbsMjyZHLvxPHe1
nVz9E8vCxUpkryZAzy+nHFj9WzvTHApqzscKF9KE/OwtX+U978J8pRw/fFKMgoKUfSE6Saim2+c0ynrA

Zsx8pFO+XnQ0IerJGJz4OqjX11dBW17NJj76kFLG11jxQQWggrDv0CoqbUFIi9f5yPURB7+VRNTrV/Rq

5VNNfIwfkhtt2NWf4YYDUxoFhvPnGEPl29iFhpQlhR
4BcypdWwdg0RHeNZ8jKsa0WaZwBFaMUPonm2mjQ82zF8yl4oyJsqu5oq2hVJjRLzjokfY9nuoHczxIuI

gBREoD4f9PB2f491T2y5fcTnTdnOcRGMZKMraE+qiZ0z/B6gcPqJvKp3+pfC7ippHXGdKi3PUKGdZPqn

vnm4QvsVH4KEClhHr3CNWYASxqI6DdEjV5Beq3Can1
rO9Zwnr7Fx8Xcpfd5VywQcNMQpQnHvXNr70wLX6Ob+a9I2Aa68zwRtkhrCevO+kiPbtlO2lXTxB+5B+q

jFGLM1LXwCkCApto+sIEMb899+b17HzorNt5ZxOCEpjG2hjluDAxEIfxQIYIsC7AA6MrIJlpyqzDHBPo

9Y3lEcXnWc5VZ99nbefJPbhpTg68WrXWjz74jZo9Rz
qRJHQktGPuj9yFgIE8WzLEyyGngHSkFj16yXu/mqc+kWGNOURJL3a3vuwtPcVXkmEDFdTNzRXQ9uqCIM

0ys41jokVIpyzURILVoyPAzys8J2cblJuO1Pg4xuAnNkOP03Gjh7y/jgjhcTEi4IcpU+rCjeI78jEuS5

p7iDSWQyLn3FSNa3PMnMF2GhI4Nf7QvsvocdBtK0vG
oL7tEOmcT1rca+xcfiSOhhbtHtNxufIpjWOihn76KMuhy3Lldu9HhQ6GjbqQz5AXywuzByg+c15mz/Xm

iobFM0uyPH4q3LpV8Sw3O2kFAvYXAkFtkZOguzEYYMDqB329sJaSOlOUFddswk3PlvySq9nHjO48wpN2

ptHTY+BBgoQQD1vQPk9My7qU1A4xs/lDHHqhrx2OL3
6HB6Njj0j3OfgJz0HquQjC+W73gl7i/fn3vs7G79fkcIe6UJ0GRyOxLpw5GWi26muv7X/feteN06+HRm

jvQ5DUNtVOLhqAGZRxoq9uqZf1lIZD/o+ZyNolQ8Z65wmLie0Xkjce2DGT+yChf5LxBYC96U6m1tp74q

PAwwOMbPg9aA4GGMx+cGMaGk8W3ammXFjrQc+hKu82
chmBiMA3rBXIns6umx7oaMJADpN8If2qDKGD0FCRf4fgLcqa+FSQ5nGhsSlaZAfLyOXTwuqJYpPMr2Lc

A9bRA3YJ/eep1MtRgxwUURTs/b0qEOyS4YdGd3Onq7z3D0A34lkMhSAJciQK1SjTaVhJUQSXj7167eUr

UfBi6jbIU//KVdVeLN1H+j1STuNU1gJ9Cn2A8l+B7d
STQjSiruQXCmoWH6lDagDFj0fh2t7hXdZ9LhXEueCieNpCggbqha82Hej616YBxIV2vkFaHIbYadjEpD

aja5+gYPQt1nAM/6Q67aNWH6bioAudzCcIDI0Q9COniHAKLSna2uzMrdMZPLIzQMYf9eKW2rC4JOwvJU

vNMZM6pGIglOstBzdl0ZUGnka2guw+y0xK0VHvm/fT
50S3Rkd7l7gsDqNiMgfpdQGdjis2E+nT2K8/Y7LtR0MsGsYA2+LwEcJHF7xVZ8XbuGxf7DcvaGJfNxNq

1PlUPulpnfVZvLlMzsGHUWlSfGQYQMB1z4HU9UDf9i1oGGrmG/chyy6XgE2SS9Qtx2ZcxuLLrc+TP2DV

IA+42VguEGn7zWzeHxyEChAtejaLAM3z16Ewnn7o3V
k4DlkG9OrktJKK6ZEZWCj7T5v1SnTOR8TMZLDAfVlz//wxqdnF12yBAcTGIUppoevz84NpEKiK6IW9Vb

QIkNSR149vd6HaR8v40Av5UwhzIkSSqXyQaXE3jGUQGmvgTvtojfa+kA4fjpA25mlQA/zDMGRYuMECqF

FO5EHMKUzrz06yzyaocoLzDkilyzFKgPrEHZ2XVGSz
qdr+ollzHXHjWnEsOiP7tmpkbKpkRNzolIbD0776MhOraU1yzOHTsP3+2RJ0CeFFL8DUxnTuWWCl0Vuc

lbz/mUzqndf/N6Ic0XoN0X5CbstbFdYjbZ676tm58Yk+nXP18hWhFBsxi7AH98XkFnsk/1sUlYqF3nZ+

IYQqdO5l1/4eoQk4lddLb8v2k/Kbh4sDjHbsxsF1O4
OPHjB/UNwlsrEDssBlNAtUy+pGJTUEExsxuEeR7pyYxNP3hs6Hx4IZ8zeIVW0jFXwEGg7C/1qKXZObxl

bq2mpdNhE+hWoM9lj/s/Obz1gDZc33WAZcgAxFS6ccqS1viUB1ulvdD3CppeGa+lhfJlhF8bPaPSCO0X

ffXygbIavljG3GpUj40lBec6SBuI0j7zRLEjNr2gHi
79kOyiEEM1VQVDPufu0xzEff2ZP88cBW9vDWP8qi+ufMRpXa6lt7am81QVVBGew0k1BYrzHiXlvMm6A+

CqGt+mL9ZEsRpQ/ElnbAQfMnmSxr+zJP1XFYW1l1jOyyUvEoTnAQ5EwB3F+DEOx8r5uXEOhUYYIvptzj

9KE4e+HDQXROyJcQYsQJLwjMkkj50fmv4LO2/XFlmp
2noYjQccUwxIPtb8HB51J4i9phraskBW3gTEmBy+7le2H8YMvu6KUBq8D4oRr2qat1UfW/wKHneBWjZv

9SojiugoQeogeqqBAf1xY3yIeeYkiqmQgqkC3YXPFyyq/bmVsDZxAo+ZmANV4YXg9udLykEZuNq9zYr5

f4vGZKIBAbrS12iruSd/+fjruv6ga6yS3hyCNAxnv7
yMyyevEwS+kqYqWGH8P9OtXETp3PeNj3N4vHU3eOzHOrB5+etOC1YbSVWWvwEyaPW6bKVd+OFi7jgYFh

NUIImPecRs+2+I/FUwjvw53NVT94ah4vhPz+iZTZE4DyK2t1OSIgEUV/htPOcsE5U2gltiKE9ap56p8b

HoEy6ZlhnezDAtY2QLGwjVDnl78lc9Rnv8AmXbCq3h
FkKuuRzYQJzcNEvJEyUToa1/b7sCg8a49rLL/KHTJTiz10UotWjyDJ6E615V/kPRjpZQizpLBInID//u

jbYJC+Nj9e3eHah/+T1mz1eN3yiKvm+14gIPZFYkehMgdOUcNTW7JYQlrq4gRP3IeSNnYjNXkapbo7Zy

E9yku92ldN0VeHji5O2K8zVoimcfDZONE06CkyAIEM
bG+y+byxE83ICMieDb2lqJxkoNwk4HGgC/qQHbrVND8BBtlqIHV4+DvWK9erjFl8KgkigsjYxiWTD/l1

wF/7s4lSJIHE+EVlF1ytGuBzRJue4SsjO+CHw8GFWJ8MbM4ZD9jhDELqcIjWag3J+/sC1jwYp+hsqDpB

SybNniKWG46ZEA16ONL1ASarcAVEZ92K9YrG8tsi6I
84GPaLcZlm/TCqWR1sHAn9+mVmZjwK9L+cSlwmSA8G3f4yeqczu/1yePgRNPJ7fH8LTkq1kG50mFg4o0

FMJ7kwmTPMBI/ppzWNZRRJEjZJ8JG2Edvc8OQ6nKpXV4QyCNJ10MXsDfv4Oo3o+qHb6YKueLUT9Eyvl0

FxCkoJP5MIHtlRpw0Iy/m8QmtZbLLEo6A3inONR9An
6gXTzaxM6jwo9SZFqw4I+Ola70gsEDiTBoWNCcxamKe9jTQl2EqRZz+hDNcFzIlWukDGaTq71aOVtWAw

6olfu+o74kZxW4n5+ixCfiiW6eiF3k8hny7zekhRIGmUWybVQcu33OaYjwIthYVtm0iD/yaxlTkjZSAF

RL5322fVGS2JCwrpVjtWWtDjV2Gqe9/TAXZLKyLXiP
Hj4gjvpZoJTSL1jWRw4xRdvIudP9Mxw8r5npgq0ujH0CoJggt7ekkh5zcHg3VO5o5cLNELw5rAzjunAM

liy2UMps3DaujPPNetqBHAsbqbC7YQtvVx4TuDp/XfxAJw0Zk4Nph4p7fVIrCulCe9AbLZrj6xb+PXho

hN3ttKsxHZA/zadjujgFGVpUMzwqFzAD5QmI4k7nf+
51KfK2H0s8l5xOrn7iOpTtu2G8/48pKXxIt6GGoSN2i+HeuF7/I4BNVbLTBgTPO2lB26w3yF+i3C6b2k

TCDuPFEFbNkaya5g7ul5G6wrybvV6xPUXmTiCyL5anuWXlUj/Xcj6QINJquaUwKibKUeGxfv79ZIJzut

HeTF2gYdd1/5J4n+CMU4TTLdQ4K68tp0b/jzfUs1/Q
qG19LSQ2eKFeVkk88g5/qoNgER/wNhNTLcjoUA6geKIEDx5NNmah/qfsBFzsvy9wPnhrxPe2rdoYN8M8

xEaBrfJVCCxEr3ivsoEuJywTsish9AA0N3j1ZajF+oUAKo4+bfzmHk0OVebgAniPUYMHXxCEtk9eP3L1

s1jgnCV1S3GVWgqav2N8qoe24cbl8ViuoW+cW6McTS
Y3UwBNa7y+z5fac4n3aZFPJQo2pTjo+sMs5kLbNfJOce8RPKkrsADuwrHgZmeVmTp5fefn9UC+Yj4fSG

pPwKCjsRNgWTYjo4k7tfjLvcSumnlRzKk0sDmLTGlplF0KgqoF9HtO28KocfvsXuaT866ETlt/r64KK0

z97O+bnncp+pUt4nEKintnJTYDOXzrGr5S/U7gtiEu
BDcsk1f1UYeu+SWD1jQ/mD5BEntbfJT4iXVo7gZRIQjMunNaKCRUEpdDXkwYB5Fk8i+AFzWMIeIKKcvJ

qf3TO2N5mayLSk8cCnQu4JTZqxBNv+qwCLJko8LJgTtFdTInA62ivCaTkzTtdXq8ASNBrf1pRTcFmBzF

ydzkIHz65g68CfEt0lPiDaOJiHi+eqnQOuXRIyh469
hyczanKDDaEF3ilzsUftvGEt3iNZdzoEkMmRPkEveg9U20D83RQlp4bjb4YKmJhyHess8jGerZRFHe55

0zQn3SaQ2k1A7l5zL5xpTqtp8Zuk7U2v/c24Op3vtCbj249wRxGoE/Mpm0Bm08L/hxffCY5uLDB90WAZ

hKkYcAFpT7MfDqof+Mwy5fPlDSMkMzFImWuv3fk35Z
WBNA42ZB013ZLAKs0ZFV2KAxexww8k6U9Cw6fBnK5hPIK2BGqDAckKC9wWS/laDMYm/x/B8nTxblZhSe

2uQAgS1B1jmSKwYHEmzPYH0HXBZAOrPjVxhQV9CCF5KhNNwq3sLcMkj0SvYCIOMa+87/72Xmet/eucH8

+vWU+ctx377wQb0063S/TWpW2dfEJ0ln675ruENUxi
yRt7VNq7+TbTBdTn5TKHQrZHtvvaBwZQ/TZ8lFwehSX5TcGlkVVXYBfvoUQhXPMViNMeZTHWPkinSYgi

g8BNzKyDO1UmZHnnhq5qoBHHIaAQjyfBi5dggYxkipKs7ntd+itxTZ/L23D6CuIqnrU4o/Ic6OAECLV0

8SUfHczWaX4PMCR4bvLv/FuyDo4rGzKSw1MEP+Q4ig
AeR//hegMIH62elHyfO5F44DQWVrmCXifqMZHODTWAHaTdnl/vvm8LScJvbbMCUdnXSI7QjkisieAlCV

Mr421VGt6rMbbIyfa5Ocao/eC3XGH/+gW3De5zL3ZkU0Km1LjtrJ8k3V1fbx5hO8whxyYOrDc3uZ6WfP

oc8m3mB58H3k+dNFaYJLVdWZJoT+jRAEj1KsyCxrT1
HOlnQ9GjFweMSKn/Bg7gwbG30Wv4Jv4+lxetEV4xgUjN3+NPsOZ9WbziXzcVAXca1WnYaUXDrh9RHPzp

2cTXceDAlM0FqlJlkN2U2xYsnCSWooREs5tsptNqdufDNZK9+OKRp0SXWab2r/BJWEXyxHeVsr8WJM4i

ST8Ia+nc/gorP/4fKGuUDniBVzepmqpETNeAF/kbGn
JfPuT66jdzwPZEhi4zCzjgBngO0n5ZBVT3cIyJm/5DcnxN69/kdPv0C4XKoCVaASH44YmiLzBc4RaNmB

Ecu5TW9AFehyKkwx+lC27WMrrR7LrmFx9vy4T/2Y/vXZVJPunjC8ttD85H/35cR4I2BcvJc0tpzMYIG6

tbZ3o5Df5jr9nWvgqeK7fmcneRtDyWBcS71AApRap6
znHXTkJlNSXprQ4fafYGEILt6eB9SogW4pWcllYrdYKgg4BlFU5er1nvs2vwP1/FSfFL1Afnu6GRyAlX

0MrqEX1rytescD3p36QOwQZshM5biScQDbVQJowHv9UudmmBdJvsLO0v5LO1iDE587qJ/HygvOaC0UZ/

7rugUnTqUc4x00Cpxmd+XudHSX9Ryj4pChSWY0LRUN
YyiiKhLUY+R4N5odJ804I0YXIdm74H9nxPmESj2tZES8naa7wUM5DHU+fTShU/CLvRXta3fd3vTYmdM9

iv5JTD7obFL/NzDIOwiDY7aNXJygKJftn3/xB8j/ovXGkCb8qEklopZhXncwuvBIFvHpK87FO9ArtBBD

+Yn6Xic38NDWEOS64Tilsw/4seqlEvdk8JAqHyCmpb
fTQygW56BNFmm+4d3OGFR2qHu73WJR/7brWfp7OwT39oIOPJzFKhxiLbbGhaknCjr0pif7QYwarGWPXp

b+ggbfkBOoAVtRX0T1kC0NPn9mHd1dpG5Ud+X8YZQ9sgEQs7HNsHLBr2lqPlg4fmIOM9Jx0HD3IDdmxq

g12F6L21bF1pUf8erT1Y8nc8HJYig9DBVnxq8dSIus
EQ7b9uOju3qr397UxmIemPHoMwAMxRruMftluIYJj4MCUaeJfnLN+UI0peaYkZC9OIbA69vggxXV/7tJ

wFC+7GeIGvf+wt6dGO+zP5+bJBql9hBm2FDZ8RhJExgTBdO2XiHFKoMv3eO8I6uK1qUGGj4alC4rBvDd

7GFJkOUlc1e6uxuSc2kD3rGgQj6ZmQfDGjKElpcrna
QkMQ/fMZ7OQfpNCO+x2C8nhJmRdko735AANuuTY+df61xWbWQHR67gvy4ULIe+kasSEkymbWK0XHXP7e

X2bPFP/Cegyrvqggy4xhnQAWYv0jPRfe1UAPeV1jGRRRvlN5ghXYW6tXEkEmE4obF2/K6P5jG/FSx5ZE

WaK31qY5xmLfQXVm67moyfmNvHnp2D7yJUn6FQlJPz
Gf2prIKxDlxcSk2lK9nX5gLEISJZ6EwSfuvyZh2JGgFtlL6/79FaMZcfjl7CSLn9gawHMCWRzJnD16FM

xTDfCpUycE8GB26Sef1XkciirRm1GMMHJixv3RoqhrsligJXgqhppqhTD/5PzifQdzKwz/Fg3JVfAjS7

Nusu06jkde/mZA5HtrqS/N4jyItGeHPHnRwterGbhx
rhona/a7tzreYT1oSmh7SyDfMa66VM9406NgSU7i5DXCbnfojJdiKz+UrMQc5EAA9G1dtX7iTRoSEkH17X

mPEHypWvhC4f8DI2ixbhGl3dQzItFGCvdb8FdvsqxyDHWC2fm1vKY2m2SYUw6+Zf+ov3zirmPtuv/m3C

I1aFvKr9+U+h/ygc37IV1CpiRpd4P5u6/NHPP4G8NM
EOuhB4xqvkl0hplunEaoqjiWx5s7yuPuvZGEcOJdCsnQBUPjsyMsllHcBx7C9lLGPRy8D9QO6KaMy6Lh

CLhchH5hO5GeuXJqhG0v6aG96WlqoycxHLdVM9YjUITG9oWPEsX2XjYAS5K+/xp26Vrta+c0JIS1g8Yd

9xNTGXOKXTBXlzfWIf5yHvYDspHRn7MfvW3N/nN8v9
V/ZwSo2U5xNhAOBtWCO9Wta0HZIMju8ifQJDFyTZaIXdG5bmy+M3LbF/pp7aBDnpIFXNW+EWe5ybhx/e

wkCO3qNyug3b/jy9lfAS+Gu99O63irBtxiSgIARBmuBL4krb9H26Oc80ikz7uye0pSIpEPr+7t7k2ZQ0

Lo/+hHMNUKIMjMmORu8pp3lXrQnXn/57wqx5/UZjfg
q/1kG1IbU0ONj8dUKM9aq309K9fxB9b/rNYN2qOVXmY/OrzdBou4+ye6R9bJtdUEta/KQhNlMuSEg90Q

jT9E39Fy0gVHIbLBcFF+YY1fPCKqj1ibUjd3qZgQdlykDoDc7z72hw6d3VSCrlM5VoXMYVjCyGId4sak

g6RnEUMPzuf4v96HBdM7fS4ESRWwClz/xMkV67zqTV
XynzOh1vRPbGih8kI/Ql6dPAJtrf7pbe60n7aG70IQdDA/yMsY3IEWCaZG3BLK84IcpIeeurocNg7rL8

x26262YSZROogljswfepQrVIKyuRqC9j2ubvx0mWvx1Z2CrCzm+lNDYvz/vhbhIUIHrik7NFPgjKWh/O

hN6DccPXs0cYHvO9YtqkjwTf/Jer7KdBJeyDaGctii
LnKtgiS9AX5g/D9uJR7WSYyNZAOt4MI5PZko1qJy6ckPpXYbin8S55jSSif0aBoO+WTFq2jebQLcYLie

7qtdR5oTyKLJ3TUtIoLW+8z5HM+/TFZ/W3HHI/rU4HSjZYTL3B0pOEKgue/sC9bkoYs/o7+CTb7ac8pF

Z/+0hEaOLo3kph4PxqmgDKmd0/8G+lpovOiDt9hZA2
v6lsh3OqugwsyoZt3NIcvRjGtO27dI5O3cH6dNIA7Be9v4WMCURjij4xuzIUqNwJqYWqyZpSW1Snf6nZ

9WI6UwJ85Ke+k/u0enoOL/flyTLIIj4AhDE4Zl3pHGdlRNpt950tsWMvfadr4v2l/CftI9Vlkt8yQDVO

S93D/R4rskXJSUj36WhyQr4kw44UrLtQI/4u86BICw
UVRZ86u1VrcmsE+QBp4Z8TUDviz2x1e/rqLgUWNwXWVKadV+91oQMUzhr08nU76pRF5quEg0eLmbwEpY

P0s/cuYoo8VumIrthwaVdAxar0COK4iT3kz0SsK6bMDI+b2UMWjRgWLqTTKhe8fxilm03vh+24Zmlakb

nKfPSuqhm6qbXk0K3+SJyJUcl4GhWwkgAznlF5PNDt
vYTOfloc3bkJA40NpbYwmiSxeuTROOU4Vw1dFQwYyySI3Vvs2T2KU08Jj5LWU3Rg9KkjO6ysKowCOdsy

w1w/jJaTY0r0tilfR0nJT9SEUU0fO6PhuHnpx0yCpLD6qNaDfwuP9kk0jdEo+Te2UnG9G+Of8rer4SjS

D5S8Q9GpkzjOB+osVv40MBcu9XOOUmMqw8E44Md32Y
an4kbcCAZiK4/+e+7q+8cPiaqmIc/xOqTw3i53To4QgbptH/RcOR3gw1wgqoywdBjtq4zraVUZWdH/Ma

6dNlNDQu1DIEedIRPL5Pt9uVReB+nCgHWXx/0Fea9CG02TyODfOzzV4CB81Tie7oh72+d+vJ7tnOIyRL

DiW53N6l3St26TLTRcwqTk4Q57+Ci0IjZT+D9vz3mg
7Lm1SuMBqg/NQ/53I+soaGh9is8omlmAgJx/nxjN8nU7ht2YQCTg+ZypEN0tyqt2s2xWJ/SXd9O4e1zI

4XzpgzgLUtK02nFvVGyJ16bNe6MpTdY+6GNtdNc2dtJN8cbjlNQbjgIqgaAFWiORPhkMoHlrVh1mL8bT

zSUjZ9yub0feUtNzizBkprllWK0HpduPDPLM4Ewc1e
n/WFdecEQr5lnw4xqbw9PRSbcfiCdeTMd256JVG9vk9nfOHl+p+iq4LfsmB4Ws06TYgIv3SjuOO7pO58

EmL4znJumiEC7uU9ZUeH2J2tZwWHDrykI1dAbM8e6ciYlH/QUV0LgxebFg4LllGzlSGywEmF2d/jma2+

ljahioL8AzQR+4fB54/1tX5LSpz08lFJhW3f37mBHb
wHSw2IkXwJxZyb3y+1y5dr48o/xGXdi0dKzJua6TwXKyq0klWIgrJqkleTL/0d6IFzwf2Z7lW1mDO53V

nfCt0/CFrAiAB5Bk69BxB7Azs7n02VtuZi3fvi6OAhFjdOAdnE8mQ6XAXyhP/fkuX6nLC1WoOcDzeumn

tFNhatahGVycVDy/xcQyQfptJ9M3XjfD+uq1d3wqEe
HrLWaAT9o4eHunU4R9euX/BWRTNtCEJb0CiCvq8SaD06G+U4Mf9omgMSLAZss9NeFywC1Nwp9IWM6ZeE

HOXTJ24fRosJPQngEE9RiBEHjxnqm7VI5gWDCOqvyTn5rabehKRZxyzCrOLBzNI35v4kZX83zS0V5zim

gxxXJzfosrR6UOm/qWDn1J5qixUQB78LmstmmGfuk5
Kkdw+M3TFn0H4/XtR40eGU2jwMQKTf6yiFQVJMR42+yJMvS6LtiYF2A9nH3E4iKvA05dwL5Y52kdkjAx

gVZ9cuU943iLXfgqmjXfhYoRqp/7LTIZQY9BedAA4oh4NqayvgAr9HPrx3fXZuI/F3SQzuquB6MceurS

31g2QzH11xcY+hYzVEeXpIfdn1rPeYGk4G62WmCifq
8kWQvEHERM+OlK0scOC0kWDol4iJI/dN8N6UoeuusVlZ5oGhJ14kHMmKMvd7h8E2m/CqZNQZbNemYUzF

zm5i1xOZAE9UcHrqUU6xsW9Og7htwCSOPzDHEZCfwtQe6uH8YaDedpfA/PhzLmZyaNmHKKHa9m6Efzow

hurh1LzDknwZj3c53oR+n4LpW9s0VOjF1WdUUdN4ek
bct7rlKaxoE2JUdp8MYSCs3gWu/3ZEN4kOVp0RXZInZQqufFDtyW9j5mr8k9t/XtrwMiAM3LT0qAWFqk

fbfcbVy8+ecU6fR9W/ueNgOh6Baf42hHPi6GoWd/HykdYEMyTBp3al7HZ/nD5Ftl0MKQjr1ZLH5q3XGb

7Y3Q6bG/9/UjruZAeiJGNX5dQw5+qNLMQMOm7RPq7e
1/oeowDRVbYbhcbqOAoIbHtnFWGCAg4ZhIrG2do/upcbyasBiYCEjS5aVwSqqlSN9vflikMmUqeyOPj2

NHeVFgitshLvOhoOcaeOFmn2Dumgzqo/KorrffHf4G9bIGDeHnUyi8RuCHWFfNJDJ7LWogL73v2d2upj

7A870F6cHcJnj8cPJMyj/FNcn5xPnCFFrNAFKxd2oZ
tl64wQgrX0j3obe8qT/shVgCRh3zgISQmPJekI5jGQZHxKbgdNlmiJzcw2AxuljueS44ptWsr/klv1sg

D4FxtJdo90CQyaznxwzcNHS+kmrtlv83Xy1RWzDRoDXBvhizcvOvxRJD4xI2rWAzR/g6tjHAjVFZpUV0

TXmOr2LBr0efOpH5bJXjFxra8ZV8v7JSszScjOM5kg
nhWx//6+N5uyuf7oSx+FUgED+trxdFm3BQZRe3ZPb34PzLHvqwdRG6GPDExBW5eI9mD6IOeizqpYI5uh

jd5hTTWsTV245NV3FfFbdWgG21F7x9Hh0nMSFoHeW2beybxWy8+UQTXqq5In9QzYvuwXHQg5RLIrY3tl

0pkaoUukJienU4OmmI8YicjzZqqPVEPobhwz4LwIP5
bpxq7Cr0Fbl3FCnwCHymkhiE9+7jCSOOXQ4QpQpBsmI8MFYSYe5dRs7NvZwXDlHMpt9cHN7oEdVOS8B3

2OeDIDbOYb3Oqejcu8jPsYEYdca1pbJKxo8sZShp+ef49YDY/2k1voevYs2+3oqq5usk54lAvAcKzb58

VO3twXK7zZYFzJutM/BL5ul+YqJzkzLPO4cgF0r6hT
vuuvjARZ/Zrctp0Euwyt2Wta1bGr5vRzfB3VachfRobqyb6M6Reu6QoXj/HzLmxiWq/has1lQwl8rjLe

PT30QTG/8FMVeAXH434EUr8Ysc+TtdDGjo2kyfniCetHlSIAhKesfTDwaaQ8GLoGAK67d38alUWRgZ/s

5oz5OByk2Kx/21+wAcT04nzhKYhkhSv3jn8w4EP8+X
k3Lq/THC2bKOnA6YQgwFxRUuqVgzCcuUYaxQEW+mPtSgFMtOMqod/nFvb89S2wiCSUcryHP9R/zbUB1e

8XyJm49b04jkzU6LlxevdB/HwtnB3jnun1O7p+36Exlq4icTphyRnHSUcCL2UVyV/szWOl7lqz8TaazO

1EUb1K2YqP+7xGSL+VXA4xdpyOYpw6mbieQ6JYASxV
aa9aOYhAy0GHKrsmvcC0+UcCLKEn7zHINy0rZ4X/46lJlB+ORD84tHwWJidpZPQCMQE0k28+hCfl9ECm

jHWy0+S+zpyfsGxyiGE+/KfQM8Tv+6ArdwNKPf8tZu5SzQYCphcmwqo5uBzFwwKvT2kOLrxPpspJHcRj

X7ECUN0rcwXNQ084gQDt1FizggjLwdMdRWHMvTePHl
xr29VaI+CiH56e9mVUqcW30zvhsFyWGtWVMrYQimLxeg4iZfKhEDGqdfvn798Qn0EALOQ0ebXPbwgO9y

V5adZQ8i3Hzdd3m/rGFP8VqLV2ZRXT5gY+fCvhRXMwcEqR8bB5C8aNj+SMKyVy1VPRuHMePuuKlei+SY

3QU6Y3IL+jKgQXZVn/BinYnLPbsMfhpHi6AeYdceur
8+9i5XQAk3InDEJT+GTpFbliNnXWbc3zg0NRlDnezkofrraaGlcTCcCWqEMPDwxSZN4IhYkfUHVhkN7j

YMg3N6p0yIrsSDd+tR83E/JjHvHhx4Q3JRCSILCge0IF6BvOH58YakPpWwMOG0/fTy9oQ1G3gErhm0Ya

7nnQAzakyzfOhA8RLGIHNgQ89TdvEqujKkgSqCR7lV
bMk0M7S240B2jS6KeKg8gaQ/sPeFo+vQk+w4KzlMlwYt7QfO2N5jyUwyXv7wZqjudIa/D30ABvHmsN3h

QSN13Ecl4qeLjZcjYhOYoRGLu/KF0feNGLq57f9kL1aVR8NAbqtxYpDFjkLy7wCB+l8peIXBt8BsvA5C

JN7SIwiGuhOZzJfow2kE+KxNNdhk1flY9GBJL4nNeY
txmIe2PzjizqIxAe9/hUW0aW7DwmdAEY7aJEw6LnxjocmBb0Sd+U17fWjjk7xYS/1ey7uj/bMJyzUJb1

R+xQL+jjMd/tU4MK/vVjfrK5TE8XQx5bxe4H57zSwCvUM9jRPaSA49pswrStJo4wGgpvyi+XqAa0TDVH

GorsSebZbbA80p+4cZJV7bRvNPkRVRgVVvYIYmIimr
yPnlXz2YP1nIy4ONoaN+c7cisUcPtSPrpaygGyKjRj2obbQmSP+eY2wfLpjK0B3bwxN0568fkWv/TpfI

RmbgKk2vzEE28FZxMcHu/U+W6p60frFsqejBubdfJHrX3IJnwXmpRBuhrycgK8D0zNTYzrglbqUGhoYU

1dVrEQNRTmJn+/kiGiULZNn/dBKVDkSLuF3Oe2CgtW
I5rDqbdM0UBs8KaDwxyaepPxxi0b0R+iEl2vxFmTXqEiehP2/PHefkKWOYdDzeVQIOTsKLEU2oW9XQdo

aZbM1fDXxgUH4V2Hs3gbuIpBUM/Vuked7f13ARCr99j8wyuaN33awINBXZ1W+S8vYz54ydxb2i+fFqEU

pgEozGc6krMZWjWJwBdfQdXx+nYAK/lsZ0vfH8nLMc
H9siBx/kEcYG+sMMyLy5oza8ryZs+X/o5o7hZRS2aOU/FIy1wVa2c81YHSkp4yJ8CNjq+CNyKB7H2LVa

hLNR3wQyqxwW1BHCzs2FziaqeblXBB5IOXRC66tw40iCvUDSyqEgXyotRxJ+Ri+z+Bi0QHRDiwN/Rih9

PJFR5G2zAL1zFPwoau3SvJmeRG+z0A+L7VFa5BK5ax
LPndYn1qHlzV4puyz3N3pjLDHIkhIvR3V6KeCrfSzkiuY4OIx5mvWTcp4uFlMuYz0Sbh1Vr+xnJRagJV

9hV4l6I9YYWGSNxnRVy0aAzhszwmg3X7k1VVkMeR1qnS5zTSlTt19g4biPAvQNYlIC8hq8fJpXCwxQSY

5YGWZcpylrBhTHn5YbcQqfvdQn0MyIfzLO89uPfm+T
q83wqErN+mfqgsiZlhhQXSB6eFG3q4pCt5QtQBS3xMMisYOK+OmYwhNQ2N/pEn9/ncUfxhHfBP2pNmJl

gLw2VCQa7mx1PwJcFzbyzGt5MxAxtwXmcy8YT8i6uoYCGhComIEbGhYeUu3cJAyiXd+4HaAFNBbB0fg6

N5PKnS8c9LgFZNb4GnmkGsofQ/sERx5B/bFG4xOcDs
W3LIFgupf5tFEyq9kozckcQw/rAMATsdJRyyX4x6FooZJltYOsY3hZh8klMHD89zFd2EkryjUAU0/1nz

qv4GKVLgVfsxa8Ei6XykyImrv8dVSgxJlPO5wSwqCk5Z8hRrpn8WLQH8u4M1d2Kqp8Lx+XpVzEtQR2kl

cs3/4VPR+cQ1EywCDzCRnZAAm30xMf2JZBZzfa6+2m
S0jpnP0MSPkxx1GM1EXT77w+jDF1NCb/10527D/uyVxF4HcGPzipT7Fo0kTVu5v2yyiOJ5rY3M18Rx9r

b6qUc0Z8Agyt1XdORWaSmcuU/x3CQrjCutrALElZ3Vb5jcfnCIYcEiTdkdn0tIOTceIk+WQW3AVSChKQ

kuEcd5SsYKH6OaJ+TimODSe/NMF3k//87Q4b2MkzRh
+RFtXdPli0AFONuwV1Apk1GV3X/J68y/KAcbS8WopmsE4vzAj5q0NUJJpTBkG6pXoAavPIo/CMJT3Ese

F0sQQDHkrguBVYko22X+Ni2BEg4pMFfEKYP50PSXj4YoU2aY0xefIxOv8BPUI28i1SRzcPipfOKpoDvx

HG0bUFsKPyfkoQegJG7JmN1yJHdKf4EIyK13bmFF7h
3QMBYT5kIndTXNifXbhSEn4YsyZ3V6b8+4qXccwBq1UgEICw+Z6k4SjbZiIOZpeEjsOy/uosn3NzMq3w

T+aiVwHtJpdxyc78t6HbBV/wJ1h4IjMxSK9jWnSSo2wKlMBm+1uM8DZl0AAAveo/su9Mdj1kHoWBvRoc

huHFvP/tu7R/6/svyga5trEWEzq5/8tQjSv/N0NFKu
RJfP1udWFijk7tm06pSEdPpYBVC8T6NImcI0jd+Qi+B3HeuMmgXYZ+dRkhsfnsXSlsDnnWTCi9Pktst/

8s0nnXpVKt2OIQ07BsC/q7uSqBIBocDpJT8+hYV1fgDXykKKYfWwkI5ZU8p5APIBE8GXEcl2L0ny/c73

aMYpC0k4CazHAl8qYLmAL9GkBdE6JjDeXrpF7uhhF+
pvY8dCzeLRAA7XfYuZb6ccvyE3lf769DxjBODyEP66JFs39U4S6lKUo3408bcKsF7STabbSEdq1DVLCD

RtilGzos0BkqPTW8E9jftYvlIe4at2cCYzqtSYmBLY/W5+gG3h/5ldiy7xu7P7G2zM0Nth/mlZHUznxJ

otz4FDAfEtS5aPsw2dVY5j+CWDzeKaOUqHMiXq16Bf
aVM1LAZzV0GWflEB74KNo6ySbzd1GOSMDuUc42/9wj/ldxiy5qMTqgNiI47o0pygK4m30Qo2KjPIs+M3

NN1kxOoSFK1/GgmfnR7lNFo6UDUpyu//9WOIUQGncCfnnknNeAtrYpjxO5wvD4ttMv/fF0Fi0F7ZOYzv

sgfVdE99aY5WifSBZVmtIZJb6516m2sh2B9XlG1RBj
Fjc17MnpUjf9PATis1OjAvZgoVE1EEOMR5O4r/qfppFKqXoN6/F/KfIZ0Eo0HHw8YMI6bju9/kKDRvea

Sx45W8hxr/lniI2UJLVAm7/9GH7t01A/TJLoa5Y6FuC0RPtFT7NAh8+XoWDBNAbsN2kczL6X//CxBC/y

zRaSL81Pb/mFgpfMgU4WXJHskvtiVmdi4n8zsT/Krf
Mh96xfP5NZT0lThioe+LzqS47RFh02YC/FKE6MUMcieo3McrPAuZLZZR996FIIdfK6fAbNWWbrTtYE/5

mydjPwSPwctZ16OX/2eXyl+g1A/uFzv4hXWGcdBooyDxX4a17Q6+ZnzI6yuyeD6LilZOkcs0lysL/dij

cId5dXfPSLSNur+ow+xc7cNau+YGggXXhTyaUjtnPa
46+Evans/OkUWLaVfronxsBJeZ3f2abTCWkKQCdV/GWhra3Jby7m5Baej1Y4Ol0XQuXtw5m/3P7ZAvm4F

b+1RX36MKsfPup98EX/mwDn816ggWRXJaYKmEp5Y8Opmgq+6Zp6WxUMR6GeOE+eiQXODcilvEDmuc3BY

yghh8IcoX5E/xwX08hnoMQA5aUfO0d7EkRbdTiChZ9
6+9wQvjvd0ZSMRN+/LszKxjeXFjwa7zFcVgW8dyuY9iKSSpQTmtyyPRxJ+mxpcOJcKd47LU3+yRcQfnG

ypGU0qYfo72rqIlpTphMB/xeJ6HFsar7IJgl36kgzAcy/G+zzfLDupqE+pejyt2MwhjNG3d7WggJMPjn

Ptxo2SR9o9+rthHcLC89JGNE6T+gjcL3QDGnjwh/Dl
wVk+9k7wDsdVGH+1DDDCj+kcusQeKyjBduKD5HbF9zbR3Iq8ECkiaOd2aeCm9sIPjWAow3jVzb0RquuC

TjbfvCPiqrqwCvNBFsJfyS3+Fq2lTW4mDo65HRjrZzQJHW4lUbJJB5wpEgT8mLNOWG0+b328d0zhZxni

4RxY+jTfPZ1+Hiv6cP1uf80oNgqAleKmDzKOdvP4V4
DPN602ySiJeUAZX2fEUbIqtULCGB0aKe9vGiz788y2eycoNUus7F5ceHmN5r2bT+oqGeDu7Fl6ianmy9

1D4lVPRQfsc183snlLTG2k1LGtqjXevSp5Mz0vZFxBtTYhFzDZgt7srNxPwe6InXmCIV2ZUZHPLydqIF

oebdFBU5EKRRnRH6C1GqsS8q01DTk382WPMtuU4P3k
UmcjX3hll0ccHgLgPYn+ZW4Ye+ZkhGoYMQI0QAPKuKJd2QV91WBHevXke/K9p2r/zfeuS50NXRsUtndg

beeR+ghKApYc12ijL1h5uKAgcpGHAx7jRW8ASbdL98+wc27RFuKjEbQSEsfl0oS6RQ56aI5ufkCGkC5X

bNo26G/QYFHr7h5CJRioDqZ+/+i7bFCAQ3MQpvojNT
spHzfDiE0Fv1dFjziiI6vTsD2rzA+bHsZDMoRsddFbNvoy16iJnM5jqtPFEyfWhI21K0VaeNmHO6SAHf

rkIsD/HitLhGWIiORM+LcToHblZrJcrdeTR+ZoXRkF4Wz0KEtYIjKvMQ1oH4hsKoq+bAg028lfmiKedY

7Ko//gK3BFbE5lDyj7qPdRDlOrrcw/cNSvZ5hE39e6
0x1S1dEdu1RdKR/2PKO5L68pH+HpLZ6AovRMCuRZ0ndmNs/kc6JDWdEHXLc7Z5bsDWME8X6UBTTmBE7H

Imx3Vg5fORTjHjmCiu+xvFSM+6UvEQQBejdeEfGHYpfouo2TIvK1RnA4K/m1rxTYKlYFNnwje7GHCVBp

9n3YouLPEyT4auo18nMnYe84MMaZ+ATOUPNmC4y6Ut
zk+xAUJ3SLaoxvEJ9gsKO+caqcMIoSka06c0SjJmvdLQqM4vKT2RDmyiGdigkWdaN8suHqiLox0xZZUm

8UmJ28i+PHp8kFoKmvBmCBfug2W/+ECMofOrTqW5fVvDNeI2zLKUC07I4OHQIxLD64ZaPDteO5+f2uTY

9URg9noa0XkIODNbEB8PvCNauY8NEC2xydupEh/RpO
4HrKGpcN6BKedJNtmYnYTOnowoiEv2wg8ss/6AlVQwgVJMLPL5i3TMp/vTIaDOpjLsnSxZhavughjC/l

kS3gfaaqrZVXGeflvgQ2ap8f+eCoTRXD+ZVQL3sGGQEpBt4ybKphkS4w5vkUxkLDiDVOd0OiWI+GkMuH

EEWYcjwCyE9xAw1vuCM91irpfHAs4moRYiu+53lxn2
W2xbOEadKJIiVUSJm5et7V/tZntukvypp3shmpvA9nzVaVovIL5xkXEs5ab/POpvPGAEoNK1C2b6ENeQ

ox17Y2lKd4SThYx+p7hDO2OtuJ+jAljGRpg5U6ZC8jHOB7+a9My5/fs9ZHpL8QKns30aBlfpbuO3znpl

Cwt5QZ+B871/FsMXrDHjUHYtP75uaG7pKClFuntuvm
A/DDnmbLcAWZJ/BZeLQqRLMW7ecIOvv/RK767lUSAZxQavSrcx3nOkokzRqUfi1yn0kH04u6adMYIO1z

fOYkDjq1Cw81eJ3o+mXsyHxImiCUUNyGXoxq7qXXnbQtunEPRBdzhx3AjNlqpa6ABk1BpAkv+OPsj2BD

mr2iCZjV1h6SiFe48ma6p7wXYm0Gv3iYMFFWMTcURQ
uJ4hCe40iXfMSrP7v5KmvKYnLmA8sHpuhqXt5W4oMoJpms4iApcypqyenFsOrAeuhGrec9rxPfEExgkz

tLfLZgJQ2mCA2L6n7KXKx/xVkkp3kQFdI8LVUGMSf20cd/Cem2wEssuOHwgO0Y3tJoL0OvHk6peR2VGU

TfyUmLPcl53uYzGD2FyWtzCchUw3qS+t7jKkNcs45L
3K2SLftodfQSdB5Q9OxFlMlVAiPqqpLrLIFuGRIXJ0xNQA1MYAJ4xnh/OjlQIMCm5bgnzDouYN7N3kYG

SZJgHCrsjohMkQKWj3RN5OC5A30P8EjqznDTZzEz7L16OhVGp04V7qo1hPIMpIi53LQyIA2gZjRg8WXr

5zr/M6L0YnHRqNeX//+JELX/B6Y2PbFryt3GJ8t6eS
ZN4h2Pt0GHmJhPAPRIb68DPeTljYe31ZSavWnDu4q9zsQmw5Pz5unlfuf8wZUtOF7NqLsUuL6+HVHHOl

Os0IYfX5hFCgZDt5xxEkq4/++KeB+vqo6v+hZ9N84W1vKQDmB8c2NnaGiPs6b52fIE8Jzapq0q872w94

tOL44uXZOzoW5kd2h5IHRIa71estu9bXwILCuseOvU
UTTQlcscDV0ZfCriqh1JCJaBKDFR47OuZeR5IJ8ZWhF2ySjUZXhN1Tm2x0njCCNuEdCjp9PuhGjPR+GF

iLE/m8nbDWrOik/dDZs//6fsazrzNpaxkRv5+jJZYXPDtHiVDPLNNL+c7E/iyi4P249vaIn86soNtTDk

Dd9G0Q/za1pPblGeg2ap1ikKl1sW0hLVNCkXRrzYEG
65+0YGe/bAbjDFg94mucjSdvoScWh2s0xqhlSPM8PZoQ9cxsTeLTHAzs4dkPPJ7qY/Ut4SOP1ShN4l7u

zUyLWppg6af6izpBSfdEOnJcwGmyukuGUqGSlIIhAA+Ppcs9IN7chbPMKr5v8/i+2L1CUvJBXQPNHvHG

YLKlzcTj2fhP9uD4ZrrvRy+J3fNLFzV3xcLxLnkNsz
lUyBqSzFxlvy3qy32Gq2nMr0kyi9hLNW++AtG1WdKtcogpNjXg57Ac9N7EX9IGVYSbLY8ghMs6E4P4Q0

mZWeHduq6JG8GWwCNar8uxGXJoSMXxNUTS3J8hf3JS91Tm4qx6sGiGozyOJw11mwjCED94FBlvDrt1C+

oNY5FEo8XjJMTKLfoi0JqJgP3Ivbl1oQqb+W/+fzy1
9krAtga2w0svWmq4ywkLGM5MST8di+98ICIClvjr0fC2vaawPTC5CMra/QxtgBzFBaBkx1ikziTkULuL

ko1GjIN/czQ/btnLnO1ip21RcZwUOdyCoTrirmwpp436d+YgNEC4rCdAyU2xXYnc4R/mHpqY7YOomGd7

vxJ/8vuyjXcfIpcC92duAQwBVpXyy3WM8QDtxtkEYx
4OuG25nnDDCqKF5FcGFEtr9UXonh+xlrmzJmwNAjbZv+Qao1i4Oq1utWL99PU1uyeTG51XxiQaVyt7yn

KBdx8plr629d033hJOlIu339yy6dpE93/TJ0JYqP0aTjc8qz62SX2qefypJx8WpsGFc38F5xnPIPReHN

JT6wjKbGJdIOXw7Pnulhz8FLdWWbpZT6PeKvsmIRSF
yKdx6OB0xKmLcrKxy2V2z982D6ntMgRfRakqIKz5TWAn+kUgnJfs2JgGIzg4Po8qef2aN7q40HoP5+m6

4jKjsra0LnGc+8go5EKY+zysx7gaxZ46MD2EDL/Qir6As5c14BH5Fws9sDYqEEU8Kh8ZbTcSdPtJBWMa

YoqAUibLuOa2Ds20+Hwj/Sbr32T7VublywZCTDk3iT
BtakHFsqxxHsYz/ffN36GfFzlksEPWBOvxVFj1N9TRdSUzcRVEHXf17BE6yCtX81HeE1L4PuqeQUeC8z

tFcnT9MBGegsYYxgiIXOMGmvk6oDN5PcYCXZ1suJ8N7QA43+y3daFy2CgYl5fqkfV87jjLCi/DMp7lQt

xzZkHFTHeRVTvOIzTGye/3G841wjgnfyvNWBb8tD9I
bqGf1D6VlnkZ8WXdB/f6RcokyuHOPCUcGrJFwi+WeKDb5C+veQ4fzGa5mDypxosNYx5V1aGzea7AfK7c

xBW7KZb+fC+gf4PZINf8hGNSI3K1giB8FGlV+Osc7TvMqMhwuE/U17PWQOb4VD+TS2QDDkiY2M4xkPON

D4CiWrIiS5S28GH/t8sjdZiBi5P0zJSyj6pgyB2g4E
nSKdZD+x7rC0dRB/VbMIcJ3VTu62bpu3zVo2EINQ5cI75Z1s7rcdpjTFmYe+uhshPlbDuvk6cDY6/bpT

/KMQJ4fDhdX9QMT3ylGqlCi3SKWtIlAyS24iTnJxteqYlqLavajDoHwlVVRdM3+yylTT5dmSN/dZgQ15

+wFHMndMRs99hMdH/GlzPLkw4hLrIb0EfEQ82Te1Ea
yUgU8YLTln+6G3MEaoysp50kDZFq8p8DO34hNrS27pMKlrJ2ZSTwXeSAUnQb+LARcbwZa7WYT+moypGM

zsL+6TIkD567xCUmnT67cqhqLT5OK8KrBPyBJfSLf9i5B2CEPrFqwq6HXoDG7v18cgfka16iXgajoH6d

4cnto5GAObUmvoi+MSwqssgP/aFDCkAo+8BNaiuPh/
z32v9Tn3XRBD8D6JcxNdlgbLEgczLWIbJ51RJN8EVlA1k8JW+k5SuVO51AHvHd7+vEEvURA0UibtfScv

v1cQlaQ7W4OQFnEhKHfCI/tv2sMgowsRehXTKZiNP72549sfnER8uqVZ3ZCma5YsCIpYCr2MXOHZQnvj

8y4lZ3pibYJcQwIWs3aq9ERQthmXHmrdISfFZ/YGEH
u7eAGAUo3Ebmc1qRr+y58Z1n6j7uM+4/iQzF3+r6o1N8AdpvRoIJHJNTznuTBXu8XMNEkd7R7Rbq4js2

YFEfUWCoM0W+LIUyNbuKr4M7hyqPcyGkbwxZ0v5eJ5k6Eee1654+oi55IQK7VLM7Fdu+vUf16F/zoL/v

V5SBXacQc0w3psXE/0aJ+MEpMhMC4BWZpl0591VoS5
ggw1DaVtjUMSHOSKXHcrMBfnfMoFDwcuuTIYdkJLdsErY7HWXjfOMz9I98/hEZpYr/BLCnAhiAi/lWd/

WWlhm8U7CAMx0wgWINnC6fJCmCZseV0DFq7RCuykGn9Tw7ENXJDOawGwGeIOnv5q3csMMC92E7wVYDTu

xcrb2tCy/UWUJicmaWrDKP9U9VKHBgyfREiNMpb9QB
GKBX+F1GXI/cEiIjJwoeyiEbecuu2Wf+41k+d9eY0+RiiS1Jt8hH+NagtIQU/A0+r8qyiupq3jRAo+IP

fYVbxxIMSbR+QERHXo4bY480zeAZJLpyWZ81u4ukmqyCYpS8+qR/885hbxp39qnvN5BfywxQ66lQifm9

i+hfLF+Pua7Db4MKErAJ6IKLC1B162RcsKMyznEC+x
Gi6yinwSX9L6X8yVVXJGK5zHq9/Xy0IQ31vIhCB5lKdvyTxNAb93U6wJERK+WJNUwGkh78J3lY3hWSgd

Yu1lhN6W8kD5b+WTuCkFb4Rgflvsa8ofcR/7QeXnfaVR6/MUVJ6ImhQqiXtjWsbcM2B3NEpR9/m04uro

gF6jiWZBCHbm+BahPsTjt1A+pDs31CabDp5MIyr+ju
5bu5/Er9YJ7/XHhu2AeDSz3UT2CQWMwd/4AsDSThyZncS6DPqYb5s8enS2SxcKfxJH7lLkIIDggrxkNs

rhz4Fjy6wliwoJO/xhnZCCihlZPXSia46TZIZcEkJ7D+YRROe9u5ikgOg1/aLlbeCljezq9xUa3eXZLJ

qpyH+SoAuFcanvICUbhlzbRvhlot6u0y+5ksnQ9B6m
U59McuVkRmpi/Yqjou5A6WUeHFBOgKkO+wvAD3QSuMx+0dYomK3lwizqSbije3fOGrqTtTzb9+FesVmf

nwibuERdrnvgcJGsgmz3E3sFU+qz6hzXgxWtNnciLLnyYqB6Dv6Xkll8/zpf9dyCh+EK2hYQmLrZDRZp

N5JvDbOvaqdZeW/isJNUW0uV1u/cO48qD4k9YEwpry
Opr3rzRri2xJKtJqouft88zCZqtnDU1vtlfoOz+is0RvjcZHUSo/sX0NA7MBdq/iRtcdiEH/VRsW50qX

3LiWWixf8c7tNWmwn/7jx5gGs1s3wi3gq83IKbKXZslRrUzdLycuhydVn8vchDrxhWRYr0lY+e75X5wS

6xX4oIaBqCJ7VtizRs4inOEMGAXGvCLHTQMteIc1js
FLZtMJbzIkdmpDBGAP91E53jxSy/vGT/pMeWJlOlu6A36Sq+mJ+EviXvapkkXYzl4i9c8yEz5HgtWxuq

Cp88wqq6G6WKN40gaSYsoeuFDXuTdntPOI0W4bA4dJgl2wtT1bFJVxVqJWuzj6ijIB3QQP/dS96YMWor

vxfJPSno994jcHJvJTLgT3/NcxF3YpHCIL8xnHmYVo
0ZzgCAf90FYBIKTu1gj/ZOL8CuCdxAXgUUDluaSPd2RX+sPKUIwgNcGIoOpjAH4gucB3KfqpbkmXggnW

Qb7+yB10vgbbng+QC0g4xXKFi3uZp5t5uCdMGD7ss1On4VcUY/rDdASIDWKY6934h96XRjg+YGkOiBuH

N9XGrk1Czao6+1W4MDaLqERayw2B5BwYgmgAOLj/Lq
aUX9tDvnFFBB5ng8mp3IgYPnY048edlJALy5+M0Ib99OQ3KFbSj0KJFGOjC6bNhWNEj5yozlpEl/OnkA

x5ghc305YlCbLW8o2r/3VU0aZoT2vWU94/jCw2Dt/u1bdJY0rBD7oqmlV3GorqNkeUrsahcaJuUMb2iR

/+JffqD3G4Z4asZghPRsvgq8wTn554yl3ftHAbyDVh
x3tDgL0yE4J4eNd+iyeVksbNmTphVnKvfH6tN7uKFflL+9sh26pd+u5fEVCM0Jcx/3asHF0So25kLdnl

EdIAoruDdERO9wW32GIl4j8o9wel03EwTHit6FGcg1J/8P1GgaourHgJmkQvuAkmmJlcitxJQZn8aE2S

p1lWBre4eG/Vikj/0SkadTS5dn/TBJ+Ct/6c2T8hl+
GVJvsvhXfeLObO4Iyy2rflJr/CxkavG7tCC4HNFCjkXOgvkvxg5PRYxDKZEdmdtLjZmVcAROtD8fv6TK

wa4UzOV7RtkPgmLCBnvpCmIBk8CxU4JHo4C0onL4A13JLtTV0hDWtCL/iszlVKgQOv1Q3jvpVSjfpsOF

tB1yNkGNQouOIkkG3pe0IXufM3EvQfJIH6rLQ4zrlJ
3Gts2zIwkcxvNl6dHQRn8hcT5oprmyfi9+BEN1z3pY484LljEZCeizvvF+urhVhWP/NeWTmz3sguaS+s

emMIVVjfykmEFenwhu02WfzLP+/RRv3S+BzFY+sadFpD0Ot8d1jg1h+TKRgwXl+U9rVbJS29NX4K+ypU

MK6uHrnc7tuNQWjWNX9IOnVLeWxLaVEoyomXY4k2GJ
S08g/FoNw67nSy6FLJ7QuNa2vF7oPrMJwypD/w0CVSRW2RQVOgJmmDGh8DZ5LXKS+Oz6vdPXhm2duaT+

C5G82GEqUC1untaG5RrYlT92s3NiSJlLVEDKRy2HXNRhBRkWbH9qfB9TdHW4jG/RJdXqP+y1+Y0liTzN

U8A7Qu+JfVoTPCNi6j5DJfIzUpYFV3Dvi+6uHVBKVV
yeg4ja9OBEjiQDmVuK8hzYjA5N1b8QGfwWvvAFP2+kiKTkuvRtbNx1LI7F4XobCy+DhKqoiyeQ+cnB2z

sSVFtBMdk/kCl8/bDmRlCMa8s5oPNk6223CBHHsHuHgY+do5wMYmQHn7KZxV3CSj1MFq/hT590AnYlia

pTh/AkAyhqA319X3dnZX4K8CrkEQrEJfbl4sARsHPh
NdU2f6YZ7xp3afVOVx45q2dOaPMxcvwJVZu1jt++Wzk+wWHrSJ8oPE0A3Rm/zsj7TtfWDvB03I6x62cV

fua+ddsgr520iUmR1OTk/eoZnTYVx0dR45ac2v824kWLG5iZREujR/hGzZsBRt2fQE8VZpBwKNAcl9tQ

YmVe5gcRo7Sz52Wv8XpxsIX6TXidwQBno3fS0v026I
txMK9zun2LbUmt2A76LyZObNE5drADz3xRrSIhePj6xvrydJyn1w9FV2hI02XS9/MOYJqo7+NHt/ltDv

R/5Tfc/uNUDBM2Dha/JfQF21F2eGKUJ+7N5ET2cdrA83tiSKUAnlp1/wi2g8tD2Rv3EqbxgcbF5xOAyT

/D8p7Bjbqotq62HGP+hlquYWHv/YC8S+cNgFmGYKkw
8rbiLMMEqd0qGOoqfyerzAcQ8bOb40/1qvfOaH+M/PKnqfe8c7I8XsRaZ1kEI0uF882IWjXJ01FHKzUY

vHgNo/9ue++uYXbsdn1HI6mtfC56ax/7xRjjJDoVs8pvkwcGm9tea4sYpxlkNmmOY4DTUENyerhEPau+

4CzHe1dboBunOnfwU0CrPoKRDrlBoSknVa2km32Ket
l9YR6Q19sYQsycI5wY1EnxrhrH0Bm9AT8fWzU0HbsuQYd9CgnScQ4b+GmYkSo2yKYTRJA78/haEwse9G

wk+0K4eQDlpGZkCDvQseQYbYKYvNGjYUuLRpmZh/d3VNh9mRwEHXEESlIvpUWnS15EFbZhpoK/iore4X

pjyyRqOZjZTavTrlC8IRcYN96pLw4JZPWC8yMuxXjT
vegWPBtJQOYDxFiL3QFZRvHVl2hah5wlCc2v1mDcbePUxCn3cc70QE513Sxn5EEy3LHGS8O4xGGnuBtz

1tZdQfiYkKnF3KRf7zHK6SiEsIhUZkoVz9p+i3lduzDNlqX0JfKiD10RzJLHSEgpOZR++RXj1w3EuA2u

1sKCpe1zlPR0WSgGxPr0xR0KnwRKyJA7SgkDwQxaAR
+axPxlpLgNq+9Yj1Buql56h/OCJJhJbRLp9OR6iU81jad7yTQfYdHwwuksH3J/W2fnLNhyvJa3z0Qksn

61r2JnoCCA8hAC0fD7/aUKehTJxTgeQEwGjHHEc/YAaleKokAvzVHj5vmeywxaJHQBtT+jcJtJmFLD59

r3/6wuocO9unnAKdqpDtQCwHV1kIV4hT/jSziywOHt
ZB8qm0eew3nW56R5fkdKQwNQBMgHPOkCLxX2N6CvQh7jBBOxZeSF/cu6wUcqMCuSb6b0atz3GNBYsyr7

RgJcywUYIN2PDTvO83Hg2vBRvCmyA1+jhKp34f6n74M1aLs0smYuZGRAk7JeBvjCvW2x47lFevSW5bnj

jwHk3bzYYkmly5qA6rsQM81LTu956d/SaAB1OyvKw9
9wA4V06GwSUnpRyAtaFmC6DkTOrVCgMCXz5bUcU5GPknzLc7JoxJofahHZ9dgzHCdzeMTkjitlRQ4A26

mGmg6kLt9inhnlj7voH9fbgCqkQkON6PjmVGOiotIbQpes+oiz7WQ710XFoZIUJyuSXsoixz3dgXae/x

4uXki5YKJqM7Y9AnSxvfKsQyjjrww5HP/zhKmKrGrK
cad/US1mVnza/F0wX6cEj/wA7/RXHA4SK02fN9MPv5L/DW8JkfT1Rw7wjwkVy3qepuOgU86pphvD4HuF

h6Zh4ytOeYuM6981BIGBycmzk2oINza0ti9SJOlLZAMKxH8GPG1tzB2PBUKYcD3kkyUMJ66TOLeCCpaI

rm7mC4Nels/Vnc74a+Jckbfoti9SEZ7CVeLF7Oidmp
SBSGUh/pXeMMj7eO1/Kc8FkSbTs3P3IoKQDUoUXoqcFbaoY0zuzCcqf/vDIOfqYkYfyh+xKhSzKiIdB+

b/kQ0PkOdL2JwDTToZ31lXfpbH+motC4Vm65bBm8Jor+bDoEAVuYePcx40S+VjheTK8P6Kj+fYho+Pvr

lVMCo8cbl7G39XdFQNM9uaP3iK3UFl+Om+PTlpLkkl
rIWAMP4uUFZRfdeGqEFXSXCIpYgjyLzsHTquC1DHbmyjZzTM2HUVDmouffzm5GvD5IC5g4JZ9MiJHlD/

a9UNxQiJEEAStvEgRxt4Buk14Eo45B/LtabwGK+w2a/JLD6AaaTdz2etqhlwFYnnNo0AHai96LdeXvop

1SIq66mY2EyMBIqi2c956Tmm0wt1vlvvsGXTZSvuXM
tC9ps60wJHzi0AgDnpHH8S8fTlH0MCarUORyCYh9gvaM/QwfbN6sxCj9PRUe/68OyN6pYG9cyldC9ssE

uf451A/eRkyE2+RirDFcT02w9xvVb/zjQwK8Fc5m6lt2ahmb8iNBbd7wS9ISK8JgpQUUjvzc5ZHz+zSe

huE8/H1OD8136JRuvh9qRel/wl5N9Yal5aYMREF5WD
RIB1ojBaHyZWZvW2xy/A4QLGwHALetSPWJmpvxM2g05liQ3PNREhadz3mX70jox4UTU3StpoBrYPtiQj

Hw7EFaKRJO6A8q0TMEZLCv0ewJns30yz112Q7DtZtKgXjA6sDqfl1Ds9n99omQX+xtA4BXRXA+46CkML

oYJX0v8Ve4bc/gGN2XP8lMUQYj1OQomSLKYRV/kf0D
o1VAkJN3q7hwDiTDGXHirCWIi03FXOaxzMpQQoLvX9ed1/tszth75pPQoDy3m3Qjl/DtQWX0rQ0Uueav

uNNg7uY0y0K6NSc2esrK9E8vvChJTxaNxKJgBaN3OrcBjQ+im7i/t3Ap7skXOrYVGcaEvJb8ftq23J6n

NK3njyhvGfcb1UeJapIqOEYof3pZGsy2IKtrY8ixKs
ouTnM4VZF5az8afCnCjUrZXQ58IgNrmJ4HnBLDSFEno/95xCWD4X1W/9Ol9OJfJXmFm7DHia4ST0MiqF

6Z1MFamHuW1Pm54ZNTidfiTnVJPFNIXvaOvg7o7aumpSCtVMxHbo3mv31yVPQaJCWAwcoumCsNxynlxB

EvLEy3U3l+8T/PN80m5L0Uj9bW1TCOk2R4bzSb7QTH
uINjVd6OCQtMAeIvq+KYkr4pBXOGN2HUGe9Icb+J/uVbWWf9mvV4qZd/5V5UIAiQ1ytvK1xRyasUD2cJ

jf+DqAgGCUYtaYfvXUaPGMeULGotWj3dOtiMhrhSG577fkYge1oXg4CFy3k09xK9VVnwWrgjrrUanN2W

1YPVIfgvTWWmSEyJfFpjLKwZhaAZbRJVJo8JVlojOe
ShHWkJkPdT3cRuiEdwfmqOd9WUFnc6nLXuhVROvhkp42PU3qnL/1hbbW+a0v8tbuA5wOhzIitaiV5egm

mBjKemR7+p1g+XuJld+BXXaO3OaYUupDBSFA9Mle1DxCigI+h0R+gwafet0yb6NGwN10dJo/6Do9eYVy

5L1yDOx2oqYQlzAFVX0tTGTLVVQjZJkAdXvq/x/K3j
QqzXi64x2dMkHYTS0BnCfHVp3RP8ugnJqisAXaZsyBbe6DWuY513KCJuRq9zyRGnOjQLmTlexy/zrr3r

JQrJKf0Chok+w96/s8z+d2L0pzzCVn7w5JMx1O+MfMeyQ8KVtRyElgAr+1stUEUB5JvLWB5Kyrylo8QA

/4PHV7j9PBtzQhLrMS18N9IfogYpoww/NPdvy3xV7g
3q+aB0h1uGW5VIBK+cW/MAHCz7neHpNRPhih732rUaIKIGLfQn8/D5875dBmv9EQ5HPl7jEqewK+fLAv

f/3fLVmzrWSGSixl5wW4YjcR8IxR4H0pydcw23Z2Nin4awJ9QO+BhESLj9JVuTXAgTxuyiA2vYOHe4sK

31sLMmzvaISAjCJHD+4QKApv2E+E6WW7t88Av76F7X
qJJTg7l3EW0+rVudcG2vwJuNKocFsyAchSPLtK0dRsNbWC2c7GYtgbnd9wTXks7yOPkrneDJ+XN2bwqS

5ylc73637UxqljBAPNke0qpPnsTAjXrA8M+wK3YOujMZ7dfbvvCJDqzcfwE4jns9wyQGZvaBQshZxOjX

J6eksYtzMW89XtgKdpA02NK92ddkMvH5EydZAfih2s
m97z0gMmNKs7AF7ZHjcuhy+/U/zXDP5g/mic4KpSnq+2UnpfhdTa48AZ3jkICniqCk+9WbOL+J6EB9k6

uYsx36rF8ZEapXWGCHbLiWLat98bQCGoA7jk6gRs65/pxmTRW+XbnSbytYNpL/vjjWqFQFMqDHceLq7g

cgAy6lfn64rwNbgeKyRiZ83nl0XQ1xUe+AfLIWpb1E
TCITje338slMqaNVUGNUMnMx2Y1bnqKFbWzlpenXpUq/PDdPV+F/2seu2WilS5+SUCw/DTwWqNvCBJck

F2f9ark+pCa3//J397F/qp5Yr1V+npz4naDGXqpNbOPyn/J/4h1Hm5+/uzivUNGoDdu9Clzw/1PvH0Gm

pQRFLkTLNAzPG7ZtvxiF7M3WLjqzGx9MBrD4aKFepb
etO5sxban05RA9klemqD2+rw7uYi4xUbS7SRuPp6wsbxNb4H6zWACJp5H0WHX+2swCNy7Y8diIHrswQf

P+eJWrsU920F25vtOjzJ9coMIHgchWKCwTm8FJLvx1P6/IxFW1qINVUvX7B2kr3t8FnTzS3xndLXhOtx

G7stMAfapdZSk/ZPKRP6pIcFp5cHaTuVW/c5Z3t9eu
QKskYIhuNq8ZASEc4LprNvW0Bj4KiBcnw7/H6idJ8kU+BKVQ08kgSMhaqGvjNeonsZ2U6YynV9GhIlte

/Q69C9bspaEXJphQvirITJ2W4ZH7Djsg1sAiTeLfqKxFYKdrm8Hu87CR34FS9rnjCcGD35AI3uL/Ejit

Msz6gWVFFrvJL540x2SWepfxy9qfLBHMpScy0GOz2C
cPipXtjdC/9njFThuzlOH1mjgmE09S8/TCDvnUEBmHNGgLXng3C53GEPmvD7rhQyqr/sj5JqM3jc29dl

GI/QzJTbOVerF9jwgvHSoI/xvbF+ZdS0WquArAUiY/PP14jeOit7WnxBkda46D6GF4KhnBFo8IIWCe2R

Ox2Bakzdlxa2Dk7OoIoy01qrGtzmwf8NI3jXvzTJ/v
uC9oVuWf3yLN/MthOdrrN463bbYbgmyF/uU0KchEkSNBT1rLyi0WQ/dyWk2NmDgcg/f5GmCj1ypMoL/R

AmGNmxT3GCy8FWZlHjq7EvQm7azrCSxbV6bwlKCQ65s4Hwqa2tt6kdU8tOt/ZeD6DUjXAGpI+9kdm86+

d9zP1Y71sSiisbkbdqsbBCFIPD1tLG8c16P5n5Ng1O
XzAaasMUi+Dz74lgAD0elo1oPRePR4mN2BM65RWzqkBNsWclJT6R2G0PV5xt+3RWM+vn/k5srDEQE/Bv

0tnKynKUCdF1H6fbxSHQIZz/erd1z+zCJy2o36TN1w9MJdyUroeK1v1KVRc+VWn2B7k1MsT29x8pyz08

ubkc5GwT4Buk05ovdaIUcKYVqq1V8moUwDF/36Fe+s
TN9UFJDsLRrshG4bb4lbqxiMsryIz0ft97BiXPebIS3h7jri0zAjE0ED4cWLUSR4J5pq1eqBPgR8b6s8

B1dnr05+CqxebuFYGpXaEDBdFi68tvWu3yWCF/mXaDmkpeXrvNJX2A3gkPYiBmMSq6Gil7w+Hi0iwBS8

oj9yuJu43/GWMK9SyoQCwWVugw6bJkzWAvKUtlWrPX
M32w+vXXddpHQYqS+5Aj937uV8Oc4DMoGs4Ica6KEELg6db9Dra+ggxTSSEjEQ2PZmits3mQnfmhJyeD

YE9sa8YZNC0Z7AhS0Tto3M9TJQsFCYm4wSGF+COSWwYTrwjF5wT+g9cJucerhXHReAmamkQeiZpvfXkV

3fQnZ4Z5bekskf9qsIgXu8annZQeO3aVfQWM82jHIB
DgEXkHlh4I5Djjr1WWDBxtcC9DHCawddC809LPEhsZR5AzEvgwFHB905t6mKdhwEo8tblEe1CPxjhZDv

ZUaBBnPQ2r2qcJc81JAgXiHhs5jL+zD75cWQOj+PhucanRKBlQabeMXm/BggZfyb+cuE6kRenWtuWPaG

WTPY4PbwhArwySTL3FgUVZRsk2h+ZbvaIGQJ43z0Uo
yjaNydvyu3j2ngttOL1fk24ZLE/Sfp4MceT92NBB0PB4G6Z+KlEl1Wjr945QkO1eQrlbKYQEjuru04cV

HmDI2zOnQFjzk4t9OcmOgjOSjahYTt5AT7+rLNQgrRqZSEaLW8+0m2LQqeKfQTnyAkXv035MGQLX2SCf

a1YpcVZCa+D9dWG8PlWkFVCJ+GzkrmQ/1pL1Fh7L9c
fA2hNBksEoC054D6/oF8WL4rd6BOXbsZFqzo/Jy08aU3TE/QhyOWKZc1JtPI/WWaG9nudxe6qArPpAxh

Mvkrx6phJOnCyeGiIYDek3eH4ktyXK87zOQt0K47pfstTrkGk6CFarOqqFngsi5YTdyv902jXD1l4ueU

dPz75BPvkdYdUoLwec6M6Tk76vG3QSNOl4v/OirT41
N1UhflgawT4RrlXXeF3JBRSFf3qmAefkbjtP0ujYOaz+J9ESaatNTznhZSIKdvbMvwUDqGo+Shyanne+p1X7

0Y8Yl+IiAAjyTyTbhDK1jcJrD8l+yzepF+8bvRmtcVVqPczU+Pf8dPPo/F2fb2fjwKQhj61+cLcK8t+m

+5RzaEtCQudZyLgqphFGWvgHFdRSllTyPxdEnFF9/+
QwLQOAJ5CJ4WNKI4bkZ/2k6TGoT64lGGq4VPPxjyf1BEtO28o+uXAj2RYdXqS0shTKvLo5hPe1iMP4PP

ztGW3dXG6/1np76uTYyD99vY8BwV0xyeBRB67rLyj63yX0f80l1UqrHQ7gogeAG7k2v95z1ursqEEG3E

oE/+3543ObsC0mh1Hn9ufcz/pcKe1rGm51Y/UE8Jdc
AK/Hzy2I/nwU7lLZUcxPZ51Zp9o8M397/hQ6Fb10sCo4r7JctjyKA2qofNLhsURjJR0j96lhWV85FJW8

YDro6dBtSQqrQeiTlHV6vZvJiyFxgwuJfKxoWL+2/RACrziUFs8O5F/lHF7mbJBGzjqcuyB7sczC2LQB

YobpXcIFHNufQgKe+aAh/kpL9/YlOEuN5v8HER/JSw
r3uW7B7jj5GeBeH1n0P2mosyat93ubErcmSeJH8UVi2M4SaocWa1mNiSe33GFmqUOIXatlOzHGs698Ig

ASEPSz3/Fe1x34QPFFZwjA5+4vqdnpG9rJWE7qqlxxuzzIrK031J8GFNPBZ+NsQ3a6fegBTd25q1C6AU

W9gu9ryKj/SMnHhw/HYho0ibkjXkx1aZz9o9a7jT1L
w5leiRVPzVA/P0Ge5S0lj3xG4GsgM01cs7WXBMCzQDwN/6bPA8KHsUJYsYhcDBCKmM8PF3/htshAgqYd

pSPvQ3YiuyBDhH2gjeec3tE6s3ZLhEM9OGaxYT3qOO4KkZ2uM3KPe6dT8osz1ILHVdD03YYTQ5++9NK4

2xwz+k4kgv5hYRWGrAES5IH9ghq+kjF8yXf4/tqQJi
C3t0FuOAfy6po97l8XDf51EY1aEmEDHZHWB1pgbTSvN5pzLwX5wgUDBr8g1dLiv5Au07p0tM1B8Q5NFk

KjUYF98cvSyMN8RV6F1uC/i40kWxL5H6J2B3pyEMwMg4uAkdd1VbRtyf4pQW5Jo+4PeaAVfDxAw801i8

t4CHpoVWJ6kDqhnsurLYbzAgTZbqPer2VJVYRzeEOQ
NZ0wOHlFGoa//5BjipoYbrXsfhVMhalbb0o1I8usVFg/6HXx/0evbP/v6QsaFhQwYU+IPvVSiH5e6SN4

prX732YS+yqGR3moTDW3pf4t8n8W2wGv2VWibc5tYX4dhKMjRPQTknQVY0/604//YdNE6VIDZPnKutlF

NAuSpZflaCicxuQNnqZH/sBx9XiuOGe4ExgMcmoCDN
RaYhENccUapD/nPovYQjZo1sgr+2uqfkQTWX/UzATAbJsEmdtyoqS6t3Lt6LcWAihXCnfnT2EykmCMXR

hkR0ZXNeXYIMCvt5/2lb+Yn4QtstxzFKUpGAlLJLn+wbVG1L8bSD5KdjHeSv1LE2M/BpcMoKt3C4qWp2

4Pd0shE6/S6eWwv1WIB1sGC3Frpm++DhGRt7n9y0B+
ltttkS+01yBx6lRvKW2wk2wCdkulxpbr3UAzCeXcsNxk+9C1PH3rizp7dtkKHiSfC5bq7yFuiuwVbDNk

s9aO3RafEmEVtvB8lniHFkAjkOQzKbv2JTgh/8IHm7LRn9tVIMf4h9MR0DLGujEevEh72SFnCTYVbxmW

W1NN0Vr0/4vtVC/m6aRKj+Tq7TvEtLLUA4skYAhFGa
D9t+beNHydZprG+8F8jkEuOVjAB8m+q1sb24GE5uWU8gsuWmJMd/lNly61pel6FWuxRu5PRVz4W/eAt5

X9Q5ZiiElx0anb1Nl8xIcIfU75V7v1i849wQ0DOciFFqtMQaUmD6UcRlWCGV4Id6O0MUeN2X61MzkHcr

/To9Z5mVKA/8nGorTC+xluBzrLMQukmj8se2kjeO+D
9H05FpUh5zw4VgH2Yzt3KyJpzELknUZ/Cw9rw/2a1gxZI+54lm1jt0VFvTCpbXETG/b0pP7aefkgJjTa

Rr+Q4bPPAa/vmps/KyuCQ78VL0amvOOCyq5c9+afXZXNZ/uNTljGZ5GNpX49oWYTbeEGptrl2nfrz5dy

/5odTMLvgig61KtlpSJNSjrGzBOmRr0hgxM3yO87TT
4vzI4d5q0IQTVEytCGYGON8uZCXT4zLIair/PC/NZff/73DaaKBUa1VqdBEQNK4AqsAv6PPT9abeo+vd

9fl/mBdRRjvEBlHN263gLRLCacSf5N5YcMM2jGpU9UOqoNj1w3zOoUMlTYKIPycx/kalhwQLE6CNrJA6

kobMI0xp9A5n189pUYcmCtchzcOiqAhS/FDpeXVBxT
iKoecQncUENwe6eVObXs/GMLcKC1D0DFwuaGUoccQ924wdJecU24gd82euP5YuMhsY/knsfnPN4uZBoR

KkhqN4QZWvU3KnonC6Zaq9m+p2VsgbabPzA0gCiQOIWcIhmw3CguWSc30el3A+MgOg/wsSa7/AQm0EAC

pt08xGaGuE22Hv/CL27PZ5gQuma25wzLHlvg+v8sOA
aq+hirmKbJmywuX2woz3H93fPb3GnjfG47nBuCE9rIsmZtJwRmI9j6iwfdXJtZoJ92HkDPnHB0aQC7Xx

X1mApkV4xtaA4ZCmd8A/tlcTctVVEIqV+rTB/MZ05u6soKhScj5tQ4GjEPG6DwfewX8uj1AHB96if9Ds

rktolnP6Z2y4unrdv8KIVkZchYhFeXigAn03mswbUh
bS0U2gVPpfEbzUk/U4N0nk3Ws2c4+svV82EPR6VRqaa7jj5PfaboR7V2lI70FnFj2KqgEvB2x/NbcTxZ

Dld4Kigb7UEiJzOvGcuDgjvT0ouFmgbld7TffKIpzriSgzEyHKovuoLH7l7O8f2DdqQSy9pUYLWhYp5r

hzoiWtuM7+LUsgje/XT9TSTkupptBe83Fds6WaqPFR
J2foch9Hm+jdwbh1/bdLzVF89l2GQT31X1VxcEsFEiYPUiA2jnKP6O8V2iYmUkoX+chiSDceXY2T42Pp

+0Hj0OmXkiaH2FI1jue6UKEwk/cQNNHq4oGyblyk06bxA9uNPbqjhbznCZohRdE47bdM6+rOGj7Bvn1l

RS2KWq+kyvvaSEMxNbWV11kkblzfpZXAQBQdJns0lD
m/7Rba8qlfWpHyykxmFJqXgeTWYZqjObUI+IxDDjz3+qPNI7abNxEXZdNI7L5nkZr9Sr6Ty46wZWkrwy

mvMo9udypOzeiaSWQRi17zSHs4sSO20dX7R4mMdGjQ495s8KjWz8/dLxti0Qpwuw0dpABWSRS7lbiB4n

tVp4Ug32g8T9KrMGAwSBWz2jRhUObukiCDJBjOOZbK
OibsKCqTlq+z7v1vYL8o+TDuWcyPG4Z0Kx5N4EUBvafRw83zz1kz7TC9KBCzwAh6K+fva9CdGvq5a+W9

6fEH6fnQaaT2HyIm8DjjyPE0EqgWSAHT0PTjZVOVsHlJDUP5ZvYT2Sp8b50G0FZ6ziFMq2lJeIBasfBb

7+DYt5kmfjWzqk+2HByo1PdpGXVs+wt7Mnu+Sk9pMO
uVfpSIGbdu1G2zZu5TWays5g8XOxSgS26cNY7XSq+hZWHqO+wkr1RDKK1BJTjJ/Igqq0BmKsQg1ypF57

b+OyG4p6CoXpxaC+5/SQTEPexjhOMFPbeUc0m+8dsMK92/5RLhAuojAxADpRom5Is3GdJxZfDELr/3XW

ZESY4NHXGR6wg6ifrJkjP4P+hytgMmxGg0GwZP4X1j
NbjO6M92ABh9Q0wii1w4Q2vm2iEUOhMtw5lzK0a8TUdy4Nzlo7lxFxcUfWtWVFwcC7P1tkGsGZN6AAh6

siwWgY6lap5Z7BzOhxDTCZqWR9LKhgcQBl62d5x6mFoLqz+GaPq+i0SEHYpvjk7HccBtX+NWpoPMZLby

mZ1pwzdAD/H1TT3bStMlRYvyIKCTZ6bjNZ0H3NEpXD
1xXwt3LS7NfJ8Lfzz8q02pqXF/N+NBtzGg2+IO8S0CV9Bw4aRzOv5bzYuXLqGfZ/oiQLKUJ5DaaUblh3

8x47pRtzmD2WQvpD9a/dXoFh5jZvgo6ZgPu3xJ6nQBGCA0zd+r6yifKIZfhrAM/NJIKUt/uaOXjJyeWt

nQJtHXfNvxr5rEhurHZ9m0l6MGbF0izu6TA2ggWH3H
z5SHK289z9n5KgYDB+U2VM60qaCBvgXU8l+rmSbd1Kuu/yWGyw/R3hO5y9RyMSYOODCfBtC9cJehwy0A

DVT+Ns23bQgihakO+WUPmTHhkgYI3ximeaqdoQdq/ngpds8+tPhsJkNuYYe9LnI68rtdExU2kr0fK5Qz

NDeEViINDV3Gwzc6RIUud227q4P51JW44xIGLn+VqG
OA3vVUy2oaUAv0qSd7yBtVXh20qvM/6tiqEoRxdtGEN2WzDjGSEHrb4k4b4KA8V7zM0fuypc0jONwwcJ

vKlkM2m5bwhCS/8hMOPOiLMZdk6WNAoJOGKCgIsSi0kv3+ZXR2aTCYaYJOH676BUALZ2dxVq1htxNrJl

tnZhfNX+hVbzJOrAOMEj/F8JpK75ggAdkSEHL/DLXP
MsagbBD9ezFEd/gIkbCry5B5Gss8RZLq5GFG1LSALPBgWTyFj1ULw2A6g8BUkPYfMZkevbD2bsEObRT4

ZE9DJ0PxkHtmn8SsE5EuypS2G4qZ3qRk+a3frNaCW9DQYp3BdDJn9jARmIpbTMlryqtr9ZDqMezU2qDe

13mSBJ3ZQ/u5+W+jwXoi3okd1nSo1m8X4efnPXUJWN
48nNt0/jknZ3Gwl8cpiEMQ2PRNlbjMY9rg7FnN2aHwuM3sGpRafg3xKxbL+eoKazjoYaMoxY85L3aGmM

4mzYhyzlMmUHOrhXPH+IDYkeTTY0Ic7SlcnFR/GSvsEjCcgJIOY3Fcn9h/xjQ4FN1UJAodVeWDDsavT0

W8iYBlkNgMAWjV3SGGOIYeyWICfryLpJLfQYzq8lkK
Yn0f+kh4+p0LSBb0DxM9A8gHJFd315pZiLS4MpL2XXoGMMBRJTJ1csGR1NCCT3N74G3m+oFzv3ZDB9x2

WOIEubg4zNbRAzzozayt/JLpqufrTn9Je48akN2+rpab9AEF+5Ip5D30aB1ZDDkR5i1+zBpdVtwnoDJv

nshu55h20zsnyn/zHfkuYck270icvhbgshWJvab/oJ
OBZLW5i9BgLlF6UeZ6L+H3+eaS1uAPpblZyXvQh99kIFhooMhivdTFy+Ci/R+t17VYDQBoEuC51J+UA4

yHdI929cpGoNKE3RnqogUAEYB8kLkaeKzIKrSVp7Y6UQJCEz0x1zu9zVy4trX2T9rkxO2kdxL/UlzCL4

YLYEM3C7mMotz/NsoXHNFvtQvFiObyyX06yAUe4f3U
VOdibCCZxXnc5OfYhEdYz+r1svRk/Ccv4xAS0DMij/jj92Lrwu/M2a8N6NZOy9t4fo1nYmd1twy5/StT

1SR6ev2Vex7Pgd5Aw5a86sFvuDlbhifgGl5AYpLsQ1LQHVLRpMy4DBQdAj5hjDX95XV0uGIey2l4uFwn

UnrBUftcQwj7mk/Eksw3aueJ1yivtACo35WA4+ZGt/
eFpwTC7MA3JHQOYSOH9UPReXLLsDBn4iQEQlfd/SUibRxs4sr/eEyp9zjr15GI/fL0nipjRE2hu9g9xe

9EFsw3Kc5WJUVKvkJrc+i8CgcLO85aX9MY2l0sXdk/Cj6UcwohfOAkVXLM8W1oLtlN+7KD5UQif2oovY

kCUS+3n/Ob+ZPu0TnQ5T4ictx/iTf4XYt9tOKXWGmq
mw1TZAc0kZEFCNyIPvvU5KQBuT7L2qlVa+74y6oScNqwf8cLIOugAFdQtKV8g9o8y7ekL8KJS24N97rF

fiZQ3L3jSxWxUwqPsaVqc8fJuWQbiQWTOF3lF6kXTctklkUV9hbBnTdzac4TxW1x+/Z6lUgTjyYzefwp

aNqCrKCewqVwx1+gCSQC6qTbj+3wA2TAUpuiKB2RvQ
k9xdWUjQjwxu+otArcC1X0Zx8aGiZ8NAXSBkOQSHA5B6w71gICTRO9V0eP0x8Z1SO3u11HDFabuoN7X2

J7rU3D7B1w3xfd2el9oy3o0UCVLB4T2/8my2IThLRQq0pHC5Xoi9bmoqai8Z2o0HBtPuZs/gMwCvfZTc

AGLse1tgWTTvsWo6kiw7dWC5z33i//uWXG36vLT9+e
Uv1j8cIfnYuoeltw/E6w24kSN73/PsSJxYe02RcqznmuE9gDU1BEjDDPjuOx2DzswAlOoxX4A1MU+H7w

P3/l3VjLNL+kMVLQvaku6sMvDPCtDM4fDVaSSM3lHsVhkG/rsfUyI2NsnUYtU0Xf0IgWmaL3uMOxAW+L

rtPQ5Y6k5Ac/nEGz+6rmdNJVmGvcU2+5OmcUpaH/k4
vBIU+3Dz24sMWJf6AEj8fFcX9Fp5H3XhUvTVzkZCxbcFseNdS1ttpY14uQG9fUUKdbgvXIKtPPfq5Hhi

7FFQcOWiCbEQPDDrXu6mtHOtLycTn88qWGH4NQH5/KL87Urxxghd3XIRx9Vnx76H9ZM+g+sXeP+SbRPm

aRmWu1Q4v/FZqcuxAXHP0R8KDjuYiSQ43D3Qa6myrN
rfqNCpNuDKxoEaPg4tqOObUAEpK9i3lVDqpZcu+PFQcnesMf9IRxTU61bYzbEwuA/R7c/nzeOklacrnT

hbtLgMwk/R3DTu/zS/1BYsP1pZMADveU/7cbeiwIvz4I9KjV1ECqm+kKnbBu6Yzk95431Vd111mkU3x4

YOnqEJyfGs9nTjwgkY8+vSi86OJvw/I83vNdGYnpy8
XgZpnWm8fap2+R1kvZKbKRT7vIqsdcY+0kxX3bIrKtx27sZmdwd1xLEoqWi2SYgd1fY/0tQFcDFu9/H5

rudCIeLFvkA3UX84fg5V1buf270wsrAThQgUyMUEph7NDU6Isuba9EC3DWbYW/cGlKWG2SajG//1rI+f

R+NLlzxTyw8E0+yjrSyRuqbs/muiNAZwQTRxeSJfZG
iFVW+QEfye/66gaUvgXZh+ek/GjNsxGeLssBkXq7xNdKTZfgGS01Q8j6RwXPrqBVzEhFYpbEq+tj4S47

xYEHvG2xvWEcE++iz7UIWhxSOWVNQAQRpmqqJgjyPgztus/Lh0YaKi+Fag5zIRSaEKAX/dnYK2tJNO1e

qf98erP1/DyuDa2vodQX/tqnZNmpcSCSnhs00yCKVs
FE3lerWf566m0ZwCRu22qJ6Ks0yxxs7SoJkd2zI99JGE7k/ofzIWElUyWMdbAuvk6ieckBm4j64jtlxr

zPds4VtibIrDtiJ6og1QsXzLLVsBdv7nY/6ltGgxMuEUl/PGGFSEGVAbrdg4ZKQDC7PfSpY/GB3VJPze

mQqvcKp5gsXXhQfuLl97njZIHfPi01mourfAhaT2d6
GaZCvlUmjNLQ6235EeKXiXAsU4ISDCeDpSQbVD3V+César+zzRLB73k3rJcAFpgCwbHwnZee85DWU8Qi51

fwhoiR1QG6D/8eLUCXSgiZDKdArjgdMm2xfgwNDXYYrPnAcRuNMQqzBHBHTMZ7P+JdepAXl/YzbLPOTg

HjLCuLZwV857xhvP+tv3N3mrrQDcynqCx1Eu7ws3GE
qoQkAoxkmZYWHvpY6u2oTy50LVD/e2yC19NVmTbgR3dXOv560wl30lW/u/bEFzIUEn7xoTwugzjVajtW

JMhnAwbRS9aM3ooPdMnFYLZJcr4hPpbyuh4ZnPNebdukYJApcnleF6ovffaQy4hBE+JLOFjGbJsNFd7J

cqkuYGen4xXzUXKR3ZrmlwI09CD9GO5+3ZlT9aDOZu
OrOP6sIKdWTfixtn49sGMfRGb0Fv+imCuY/FlrndbYJcfKPxfReEaoiTEHfBU1+8bUmBO+k6ingCImoh

MjglKuf3Q6+3dNO4q0XzAfCjBKHkZ2cWB7c2ggvO1/zQY0rQ6WgXCoNJ+C2b6/esqxI5ajT9lS3TWjaR

npR5aD2Mwnz/qdp/BUeYwEw3XadM306aXR/Enoetb9
j9Z0F3rbiyuinUu8ND4NoCfsPQKwdwzDgL7H2J1rQFd4aqrtN/BX8Z+VAUQAzOUOPcX4co58LNqnDemE

DrURR3vVbdc3yjEOxzzQUNtsk3y1vRLzL5zvzzfF4Fi/I/Nv+ZJAU2bTQ7DF826XpZyniY7L4BGNnZwq

ryFP2mA/Dpyc8HhbJ0hI8aesAtxcSpnaWSeYAXL1Oi
op7Tf/oxH9UMXybw9OkakAqJMGORvDYgBmiIr/6mEPhA2aVMYfsihwFOQAGBtprd/PDEi9CmBccnK/Zh

Jmc1wKypPetUevbfCo+fyjB0qLNypOXHx7+p7g3TTDADB75XJ9oMPjiXiPfeioOqD/O0E5/IuDb+9G6R

qTV5+b5EL9R2DCWhtGVV8RWNCUiN8joyc6VkAF486E
9vDWFRp/fl0hWOIuU7BU5GH4s7e6oxC/a6NivYVEzmSJYw/YrAYZhtR9UGWIKXH2ycPAnjyngnJjgbMR

i8EUsdtbnjTZtkR+PUy7fFszlGDuz2cmVfMYW41c3AV9/QLhkEOVmGMl2VmHe5Q7ju8Ta/TYWpa7snZu

nzrlojhqf55veIziCRwXGdYHt2wftVbWyUV/bXbKXS
qcraXkB9EZMr22W/g4q+2QPr8v2+upVdIr4li4V0oHbe03ky4dlOEgAF52xySEL6hY7ecyIwmwbOdP8i

h9CGUrsjS1StOAOLhUK0nOG4rTqC17v2iI5/cQ8OxuJbuf7DOYdM0QFLpIGFB5DXTYaPl8v6coRUiuq2

T2ZbtRc8nReAqGftwH2MNcBspqih4wykGRgcw5GWHy
uybde20FRM+Jmni5jw/CK2CrJI2ZRVfPfsikljvWevl8Qu9MsR5KnLRtQMf61J+3jM0FQ4I6dX2WIzOm

iNPwaGzaVTFcw1OkRlx4TYIHlnSBdrACnGX0nEytRGfcm8rBteZrxRUa5kIX6cbtR2KTO8r3NBFs83wG

Mills/AGIGOVZ9+n1Nw+3JFaIUZGQH66X6umXKz23bQQB
jpumGAEHTT4zeMonICHTMR0YCIqcqIklYp5/tekTUx+epNnu+TB+7TghOJsJN3sRmbXrLUMv+eBwfXgy

REW0Gnev6uEZyY+liVFZM0Jjl5ykEA/23ebDJUzdspF34WBY3CDoGbsV90NhaKAzv7rOLqAK4j49edZx

F6mXgk8Cvcjo1tgwxjwEzHC6J8NA+WYwXPBvD/aG3F
9h734IMAq4tBDQU/OjrjsE3+vC/hRsX9NGKKblezOBXApcTlRvA7elm2rU5W6d0y8Ekbpnt/cjaWeOvS

bxcjrv9QHrwhMUbfkz1/alO9inTogS+6i3LEzsLFES1Pl8cNETcOfbTpBb2Dco/I3/Qw1R4eaTR3hJzv

Gi3rLI8WhAdbmFmdF6/0LvCPNftJ+Kxsx9HDcMPbaS
jTdA1ekOxkgQmU1pd99V748R01orgl08+CmNiKikxqE05eF5coO+Q0zra+Q8c/twpsYAhx28lo1vvYn5

HqppoYRw6+OQiQ5Fm8siCfD2bxtHygYh8rWEHktldEKJsBQEK/mVn/CH6rs8yCdd3usssudK1R6lBL+/

MXn4iTT0z8fq94b8kcfcj3Q1zXmKbDZzq/s3ucLrhQ
UnAmND8MGHveXP7/nTssjuvTVwF+gLXJp5Sj8hxy4YRJ6xu+yLspN079Z3rOaOh+D5BcMGxVwZ1apbc+

+h8QmgHafLmoWnjcdwXVaQjGdu/1MYTOsfXT/f6fu6c5Ki3pT15wanAiHwGIk3Nl6eEpG8O5x7lXtX2D

4tGm9wpy2IxCyi8Rk/BCSEX/a690e5itXzSQzJ+8sR
TGQlvqK2FVdyXfnSaD2TMEgN+LSvb5282ctryf9uxpOmTK7EVzBetM39nCzwUz7o20cdXqBsJAHTQGTe

vpM3b6fGl9B1eJStIoQ9kHaD5iHlmdKqipCQggVN6U1v7iO+lzZN4Ooha+2WKxweSl6DMgYGUeJk1Kzl

gckz4XgM7+YKuer5KUNWmDHM8tB6r11xqO1/HpKR9o
8UmCrspIOQn8xupJR0Y4iJhow8jRb2WOL3pJdzqCf0D3uaR0guYA6bQGoW0ulDOw3NMv87amt7W+j4Jy

naw+JGj1pegA/nZdVXK060FONIlHgq6XY6dGaZhdfFYL7innrt91IFEKgGj1uKZpP8uPt1r/AQ/67Z02

uKtZPm7HMcJwGBxAPlbq0G8up967tT9HlJmoOioy/g
bwI/zJyOscdN2UCBzdcUgNO32wRgesS6XNjE4wMtMP64pYMZO+YGsJi5cwpvqeOBLXX4bruLLvPEpimT

vyZq0xIcdoVwtgHFM0oLsse45bkPqFw6kh7HOwm7TjezsVvs/CIYYKcUjnha/B2itN9U6yk1Kh20+zQ/

nl6/ljX1iYpWqK0auV4xciU/Ht9AhX2/uTC4BwciKU
m03KRMLkbGIAvZBLgBfmbZiJ/qJHuh0L0cnV+svkcE4aS+5ThtWLY9NXbvfnvi0AVlvLmff/1pNyBxvN

fm4TN4iwnwPXmUAzkDXMOfceEznAJmgpkDX+S779H1iLKHWPpmlgpxoUICZbKjyvwbBRFrD4pnWTXjZg

7ITufKQfDcGBWWWAHZE36WOLp9XZHD84ydfpAMUykP
veZGS3k/5h3ScD8lH86krGEx5NngOvxAa13ZfUp6XuZsloLawH0Nm/ad4uln3K5/jUKKEPXqMbw71keD

iR4ipB8apV7dR1jI+V4RZqSMD8hbNajUvO8cQjvhAcFcR68dHKv6FQPGYHqpbA3QFb1ez9Vj2eJtTPgl

YjsJQNEqjUaGxTE/dYfWcm4cN4QC813cC5hjDdjhbc
G/CGBf0fH9MA3wyE2TdH0+XfDTDdj9i3slHENvHEC+tlZk6+oyJKPPcIRnPHLuQHyKI9w4e+dR2mLuDm

fQEOkNtI0d7R5xEY+iplkNf3kw+SgLWLWsNqteevZhpqTlu+77Gk2Y8V3zPyykLoYakjMfDED7xhGSD/

FYRpuMNude9d+wIG2FM93UwxpEeEvroIc+ZSh6IqZJ
O7Jm0haz9pphuowxwzo3fz/TU/z3WupsqED7HFeFNvNtW/J79WUmdjJg3dScBUfQQcK7ZRWvdclesPSB

GLNLnBLCeMwURcsvylEX2t0PFHSLz8G/Lnrl2KzgX31fNAFt361T3Bz7qZlJmiNGtN800/CjNjEToPGX

7o9awyzIP8SFnAqmo1SzEDJ5m7FkiVO0V1FAqmGXlD
VsLUsaZxhLqTHscLWtPDOzR/Iw3BIyqGRe/kibJ1G0p7BrVKqhtcISjcZadR9to3C4NkTV4goZ9lYFYt

imTBefg6oEq6gjSJh4foco8u4GXYAfa+qPWLgQf4fccACST51E2W1lrolLdu9aTcmnqxNTCWOrpFvK9H

JhJ/FEd+q7cqEro3pOhHLLeCa7eRHZc3pNo/0r+l/c
4N02Qg1S4DUug/s3nfciIq+5tIbblOC5Matmd0rkKhCCy5fqdcdb00E2sulV/kiQ0uQ2geleZfgZ3o6M

5ZgYW9weYaM40XaAzZxoosb1+zC/wGsEqN4+/NvAGgHOP+Sq7s89lx8uWjblUxkLluHJ9e2RJwR1zixI

aUtupnZeWrPuzBvnqPi7jGGFPfUfj4lJNhh6Ddurzy
vLK+u0368KfC/Gq2BQWHiYOMx/Tj1z2VrLZc1ar7Qpx/JiZ92FUoD/YTsvuyULIX8GamI2R7B66UT5JM

fkfs8oq06wiSpEkP9R61gjHWYLCvM7KJxS5gdayhxLGL63vuus4CXp4BJf1SV75qHNTeXAd9+J6k9K3S

3p8ibY0EpC0doyTz09Vc5cwBC1gJa+8vAFZtFF8DfB
F51KF7jN/m9pjuJkLbdirsHPY6hq80upQXELZb4uQfnU4mykiiw9MUjHBsqoJwNwgzJdDAjeViHQeH0u

aOAL1yXxNrxmAK7IUvldGw+riHGjMpCT1ezLxnTwpSJd5fodAnRf2FrMk2fojcwwJxjjU7qh5HGge5O0

nVJfIJ3OXa17gJ5oWjfxCCB9S6wJKrZRpZ/I+FqaUR
NrGcWDcvoj1KD2ipR+WkP09AqyRsQgG3EfVvEaOpKyJsdUGQPfRs4KdjBiaXRfnXIGS226+AvV8SPUaa

ROpvjcOl2SD/D69nzLnGepOvhvoaTnaJtVU4Rm5dh7p7b9VK7wfN8q0rF58tkVoCj/XSzJUiKX1rod2Q

PZWAdAG156lJpgaHFkns5KB9jVqtTIFq+XccML5+9E
v6e8algyn+Ha5e1GIC7VVIMUA/LXUA2mA7Ngd+IEHH/fS18JT/gMRlaxfhI7qDmEXeR9JSky9tM8g0Nd

r+whRceOE114L/ZbgSCIn7OsVEme4FVctQFfe6WdTwBTpKLU/CzOJlw3x1hc9BNYB+BcoF22VDk99ZmH

WMALbJ+hNA6Y9MEhIJZN1JKjuUJmHXsu26TbVjoiP+
d5yri08DlOjcecpMIpL/C8Pp7gtdjMbPWekt/Kpg9ljC5hpmAQb9QzMb6HyinM0ZtDo7a5p84ydDU6t6

tvxgrhsta+D/MTerWiaRZl6W61Goh+AsncyRotD8IkUd1pYyBET9sZYuSh6rYXQCYgLro0SfC5EbqS0R

H6WoEcpr9gOKRqNHaLphyGUKVRgPO/QeQxW6KD6V21
hfVgGokyjkhOMll0G5ZevLFjB3/GTC2iEKjl9xvPFFDE0b4hVfP4deayTg4MNtSS/29h7pZ3/dNNRrG2

fd/r+ykv/6AjI6oL0t/cWckff1bjC9Gc2QF8H5aX1hB4/WpHhOtNB6OXSi4X6+x+/hYZELrgtZwEqva4

G16dy0cxdrGk2t7qKCc0EOEWrBNcLu5X9z4gjNiH6M
eFuYobJPIyLaObtPiRlcfoB+S2Ny/0wGbGVOsKCdfWdLZr/SzLfXMdP3Au+rVF3hvlFzvk7t8J0kV0+e

/KqL1ZhLDUvSN2mc3eKNj05FL5+On5No10DR45cz9R7GAm9TIFhcOTzT5mkCqAn3KzZAPbr4krFflwbY

MQ9aFasUUCqAB1gy9KhPAjqVbpHahGMF2lf6PN0d3L
SS17wz0pEm220UZ7l/2dYVIwCASuhlnLTo4zh/xSeG17PzzaI6uVHfnXU+3Kcfb5Gx9mC8ckb3DOwqNA

zhcFsyW6CAnaMMNna1kMB4VojvFvnK5IUtIswks4vs5OvLn7izfydVfpGv6cbzdpywbzlm7vnHDnd35j

x6vQciKu4Yp5yo0o1FNpdebjJqjma10RTkff83CKw6
k92b/kqXcGTfnUsLvU60AX/OtKubE2UpxwecTqbDl3SO+ThMbVtDP4LctPVF+QVvqhihNS+Wfpovu+Ky

uR5e6RQqbLQNeFKc0FgISoBJ5swkCYOSq4VTJh1mXgQN+RzkxeayIghzkqHb4MOaA6bSiORXcGw4cps2

CsQSRe2n+b+H1VRb4yWv4ETtSH8zMB0GKzv3f4xktr
K4CKff2dlxEPT544JKK1vwQJpX12HVReCydgdjJVLVJ2GdJ/i647pd+rinGeEX527uowM81BMjfVD2wc

UPw/BG7uJFvKz0gMaPPatv5r93FM9/DTFFIuNsFy+Munoz/46d3cNarWrHnwrSdznWuW04Vj5DHI+vU5SK

5XeUEJcuXBB2JnK/9Gz4ZmWN7q1dddBVdHmZ6Qio5k
a9w+rqyyL//Ya9fcTM4H+f75/c+QKNZzKCkTwD7wg8qMWuEMyjPO6l7AkLqp6XA869JJ+au0CPxXTivD

hzYycwgTh2FW184+8jGXQMw4YIRLDCSyfJnvDuBnlxrODFgpkfNrvpi61u4fSMCTp8rOKWSp+WpDmAk+

SDZFMefMMgI51+4iU7QF2Rvt/fGfGWxF40myiOl8hf
ZVI03i4+Z2TND9jDO+OzRNL0+z26ppgIohlCTL4eMOcRZ9ocjF+0oy2LFtL8RIN9h7fjhU4Fv4T1ndEn

ST1V5/zGxwNSGOHy9h5hphAXRYDr7ANAMPe2yzeTHFike7q8XE+/VSIHAy2/EPNgZ/t37RWLlnR7Jq9y

78Z+xZbVjb8b4YSEExewidyJtaOWqtT5CCeuryWDl6
j2f6ziTmPfb48A/GFWc+leNPmtMMa2ylAhRGBi5DGc7+Zv4s+a2UMqg+6MYl0lfNDSpW1ly1x2lyHkEb

4doBFb8F0N+ml8sMGpyDlvufIjwVIKc6wmy66DdzZSilTO3Z41JwIFcrf7nuRzUgN/hBrtKEHdKu3s06

r+0oONwcyoof9jcM5NeGbgVLe/PGwScvLc2rNcskOP
n78KeLpkcL9n3BREB0x16+hf/FMmyTPaYHNxTD0vrsqKztNO2lMboKf/AphcM8T/ZQIPqUjQlot7ZbnL

rmtIFaDrDqpjCXstHAs2g2kwl9f/+XoVYaHg5y3ghwFuR5lo2AHa8icaNQI8ilrPBffYYxLtW6egglut

gKYvkIeZ6ww5dRvEN2CtWcr6oGgfr/AdRaearihBYk
VXbZbqFAHyaw4RppwZOg80hneZISFqfCTOq7MnVjUv9LitLVWdKH+XsohaEMH45wX8sbdUXcJf0U7rjS

LLd9EtemUgJITdE/5uaQNx2CLJwBRwUe562PiQBIM7a81lHBYZvBke58vlqvlpBL1y8uhMPE6BSAuDmW

e5pMNnac+xSJzKXrz1is0Okae86vLsQNktrIH8IlHy
BXxkIyUQ+u66da5SjYk4tv7M5bOA7uIn7O4IuU5G31mecaQMqeerPMySseAAuCWOT+vxIKFMPsnF6VCU

JPQczyGjnQ4LSV9JQzfykQM4BNUjkVxYtfVrr87qxlEPUFjtP01XYzlGnDrMV10l8tJC5tKvUgAUC4ha

9Utl5Zf2Zx16DXz/i2Q9dv3fWPg9MJoQQWDRf3rbah
ueMd5b8S4ZwUrNfrNsZx1Uhz+dIF2rYSVQMul/DFz6zdyPBMtB5MQaMYB7qTuwOtCDTwulbNu/1ou2DV

Z/piRJ9fmEvDJazgRIkZhoKBs+wUb/4Ykg/g2czpgv8A12oS/4xDjx9UsvGr045f3BWqSZ9oHKZaNDN3

MsgoOcC30YRXxSiGOI901/yUhXen9c9ZzRHK24Lks7
Cqt2APV+rpRkYjeT1g0jglfvhstglgVUqroyY8lLl9ctegPPJWijegWmOsX+JHK8Y3fin6qrezJqFY9D

SBCgoUqTa224RI+17MKDKLJqNyhA4zBPEyP79El9CbPnPnk8EmkjibrMedX8kW3sh0okCmleAztBe6Xf

+wTJbeX7fqdQCjpctLHuiqgaG2svaN5x/wWl4PBgR2
talxyWQ71efreYcQQSWTpqtqJ3ws9oNHvkD+L6eZtSr8szLVq+WnEEM6GPO6x7m8YY/v7CKifFJPhsFt

cbA9ub0nG7ta2atzzupn2n0iUEhi5KlEaP3oJGfGQ0ZqKTvtcQyLbogfCqxIkCF7hEMClEN5k6wYLiK6

fEPH8m19/PM94u7UnPOoBBNE6UR4Ac6w9y1reW3GE+
7Y/tGCkqcmw+t/Mc40+66rm1i2pu4G7lOem5h9Cuaj/ZlF2Xxy8Z+4uDllPWkCuk/5N9MvnB7f/efRVx

BXD+v1dBKKQDa6dMt/PGJVhQZyjf3HuRz0Vq+tdAM4n32pMA16MOA6KCFWU5H3aFB3TtP64a1bjMkvn0

SYshCb/YE5PpwVqtD7QlD+PhoB/i9w4cHnrV8GVJh4
9pVnGdw7nnyqXYedF5E2h8yHNx2G+OOlrO3acqhpBVxsM7Dl6YOuIrplwJTKYlloj91mJ8KHv6oE0lZO

nC/cYRUsRTvLqhMEvu5QOFkzM6bv7P9fCMt8Gnsf48AM7i/BkC1t3n4qRh/xG00Fcv/B0GS4H26BhduM

ChJjbJUACtEa/MYAOxJHs3YDFMJjil6iQalvJI4eSY
jm57VW+/trhbjBbgSGIgPYIq6c8be3XvvokvScPyYv4kVFtxWumvnzw+VSAEQOSPjO7ctDpP1rP+TEme

8KAjWDGKAm5Zfzzy1kf8Fy5TzbV0hJSrQue2xs4EVxUyElpFmmFqC/nyJ0sNuEd5ikUnlBzJ3s+xNowH

t0Rn34TBN24qvtT/jpzBwiPifFk11JUu0jizLL3d2y
nUQcD2wwyedg32t58ZeUGu9DJKhcsxqzd3vUNPuncKQLV/vCe9iNE3oBI6aTu85uGn/7T/IwDXEsg98s

ObDVk1VRSS6rEt8p6n4C4vh5+Xldh4mQAtgecHsJ+3l8cc/3C4xjliUIfrbBhmSYr80HowVWOkScs24f

vhrTDeDV/OQ1+fu4yh0M0UHduZ93FszgdlEdcgpxIJ
TLT046cFWL9/vT/UjlayzJ0OhZV1MxbF0ThXfmS3u1muLR02Y6TM92gcX98lxdlGSSV+h3nr3c6FzQqa

nriAjnTH0I4n0CJD2Tbla9YPY2jyKS0EDU5sSoqUsd93VhNw+TERDESG0492zxStW1HdvRaWlspJ3XzL

udBfb9X4bmcfbE42a/a7eTArNTyk5u/gWq8dRdCSCo
6NPyICxW2V8eIOFt9gOg7/bNcikjvZ4p3Sfwx4881pehmjhS2OBAIXpqR3b7Z3vMiSay44/ef4vN1oEB

X/m4ERwGycNl0aNhNWvkLJXtDSLIWM/mOWWaEIW/lSBDYlRKsLJ6G4wNNKf7HQ+beIw4dw56245VzqAt

lchKBMWBTWGyvhmBzE8F4PE0xglUk4YSsHADmzCZAC
AwFesE6VAkexNgAERnZNCA3VGRCwSuLOuz5d8a1gZQwt5+Bf0TWpjVn7FbuOJicgytFPx0XWsn7b76Td

auny9w5xaJxm50q7kkuFUDpx6od3z26ob5AflKxbDfIYsvUisv8T7YAt11wydwHrN1nYCQweBFy6p1nJ

u99zppwfTrYGgjHpbCLSkLqngStKc/ux+Up35sLq8w
alpH9VUqf720G/vKizI7Noio/hJvE1fNtycEbDtLFr+9VgvrWXy3iRQpPUXREoYAWVppMtA9ARcKGShO

P3VgTaNixSCekXHgRs1qkw3W70firnL/Q7sVJZT88h17Xy/PWKVlA5F0CLEb1ayz7XaU9uiCX68jpTFM

J32hKI1kZyhXDJeIRN6rU/qA75EG7kS7OxFbe3zivo
Dmxj1/Yg61fHZHPxaaLumsvHRpykBrIUmfbuQy5x1IPZLO+F0SGcB7Q/7hJmgu4gRi9nkA3UVHsm6t53

nFUvNz/rX7jZM5Cfw/Yearj4fsIYGLL08QIojUJaT+y3YgNzV84LWEJcEMjbWiyi6EtCDEyz9dwBlBWD

cp2Egv7CpRxWmm9/vMr0bLKuo8uIid0qVbQS07NYaI
rhgg1yZl6C+3Xfc2E0CHLW4MPxHS5seBQRa3Hs/o6KKlj5BCQ2Z1t9nmzfuZOwtcDulmTUECDoN0OQH8

Ko9J/k49DeAo7brKNJqJmp+AYq04JSYizaoCKS9j09bBj689NK2jT1zxPa9oXGBC6IGgQv5T/kI8zD1Y

AsSCodFtvWiL4kP4+BRoAkx/cAmLlMZueclg0CnEKw
EdX/Xz31CViG1vce57EsK72bPlHQN0roWfkqmjoBA2a5X1TjCkt/3Rwjxxm+JwcOIOAF37la+tkd+yvj

tm2NU+OjMIZFbnNYy4pRsXsCYcGsEurVGKSZ5f4RQNNnby5tFAkNgwDsCALKQTmSywlz7hdhQ6QLqgIA

+onKXQO9NI0uXGeAAF730riznYhjj4Fn3D+xPQzD1H
J6QFP1Zemi7ccGh2ZEVQUyKhBriEauHoCGAOz7BVyuddfyE9/uKvZzgn+XhXFS22tJHGklB4i5BKF6hH

NXeHmUVF0NU9vy83eFm7G2jg5XXHHZM0zz7RwkIlfCr6LRauPg3z63i6iRAXCjaa5AMXa4Pow+8unfex

+MSYWUBJJnlZLt+zv7YG/PxNmsi++vrHlfGXO66RhN
Nms7c0cudZEzDuTyW3/ZcQX4sabtFAPFCDID23UF385cBZ/S5LdGl0WWahgbJs4KkVaIC00YWXTVRusa

6CSOKJlKEN9+kdLtJqP0vXOVKLQBhihrgjivCt+K9ujQTjHxO+9z74zxErCOmQiHq4tPgnuo0/N6cqb7

4nzb8/pp7BmiU2aPHackKoz8RzV3JTQ7FmUrEcd+iB
xJ7kB+plcGUn5SJydn9fXG+/cbDAFfZ5pwK13OnWoa6wPh2ShUPUlCy4sXtb7AaLXlY2lJ+E1RrAn+fH

gXLTDcR3xVRDzzRxRMt/gLt95m0sKWZZFpwodXXOGQAjNV+1IY1iuddxzbPrr8bjiVlUy+J+vhwnakmO

V0x014a9pS32e+F6N+o9axKpeMNv1YiRuN5vucxt58
lJmQSYqd5cCB3rYNAeg7TLTFy0vraAyznbihRni9GEAYtRQNwI4FSafhUB5uN4KqpVldQgHdE4f4oF4M

xlE8KITvoyl46+6d1k1DRF9V97DdoFLPkN700/r0jOxrLGvPqFwRZebNW4msbc0J/8u7XvFm5A7IOsK6

viloy0QWZEvNAQ0D32VMFA5qAe1wzK8hBlUjS8Stz4
uX11pw6R/nKUYH8m2RqbojY5OdF/KAzIGH874hviI95V9daL0knY6TyDkkQ3GSkey+m1zlgFL+3bHbe9

lVFapbEyJDQ4Cq3rzuRGY58mP1Mtmh1FtCyvzig4lg99uVx5ewJF02LrwCIuwwS6IaLVmz2jeRE6mgm3

8gn8c7QIem376faJ8beDKhPjec+8akILi+CGAEehFz
vENjZw/EOT1KHvCg5br4bF7WYlpyXuluHnPgh6RdDAv0A/J+4Nvym8lx3Ir+zn4DVmWmhQJKLGfFSl6R

IMY5g1gr8m0EEfSIZtZE14jh+YgfR7Pzzsamfk6adp1swRn+d5/0U+UAQms2Tu4Dmai9YGvoktQT0iCs

JmJn3tmYENj3kkFRFEjfspq4DOH3fyaZcTn5myGnPC
W+GPnMKMZH6V7GHTIq9LSAtrqbm/vD53IcrVUjRfdoOsXSU9pNwXfGT1ecSZ7Duax/u15z8RLtSqB65a

5fmSKveedpFJmAh/W8W4+dIOzdPv/o7k6xCX1TI/+jIAmV6cpRsE3yP1CuRdjxIKaJLWtzIJ8XOFZkwF

G5eJSYzHipPeJUW08QrNpvQP19/6dEhJf82oDqmjhP
9mvbPm+aniZ7gAC1a85tPeyPo6A08KVnmgwsxAAlKy9vHjm/E8NudGKh5k6FLO5hdQE8y+kfaBrDH8q4

54CIStlUw0dqhoM7/4ygphn/TMfqHzGOkvmLNMnZsjfQeWLarxr3rksOGiAYQF9g7U+h8rV5MfBLDSFp

gGW8jjmkyoEp77ZOA/SAUI0BGCGbZPD5l3+JJn2J6e
G0JIMYt3JI+Q/M1MoRIppMYTlCRn7//zFL9qYCxmkyVnI3kU/PILcjJ7RdeeGSflUcy5qAZ8bnyXpjg3

u96e1nvocKUBKT3DdkkysfTELAXip1BCmsYGlwKP9Qpjt+cANwvVThSpRK+gmf997p72nskteVcHbEmj

q8KhxLKuY9qqkYHZREZZVsRndRmlnDcOHuzzYdsrLP
qTvZS8GpJW3gunYLCxxLTq0MzCwVYMn5NE77fq5RfW+OpLFCuiq0apbU8d9BDej9+XdBtSoQJeZ2ha8a

3jsOqi0mL43Nk1DCPGQpYQju32FammFJkq/VHuAd3Ogm0E0g9f+qBzRqpG01NNl/SDfU4W9RZP39z3Qg

fCSw08FZvYDPK0c9HQ4igxr5mTl3cuC6jmnkVZCeEB
/wBzZbMPLzJBaCAs4oBRcV8HIF4q3w9hzH08OmalST++2VgPkQ/Pe+iIVq2tNnTJcD1J7heNqDeSmxsH

Z2XavSIA6WjGMsKeq33VHIWpJrW8odXC0Yji4EH+HpBu4s9JoxexM8bJh86g8elrT7adl6BAuRNdyRwO

liiEjNIlxXzSMzpUnX9qZ9vL3fS1eHPFM2LErZkGZq
Hqg0xmaZFY5SZkPLbi6JMpIoRSamJYRxj0RUz2Z/QdSM+/jrwRiic7FQyGLN9wktq2h700VlLQ7Q/NeR

cgxQPbp3KmP7qm2bR2rDjK30/zrIuv+JdlxpJpgbqTjBGTfjFYd4BKws1CRR87URh3sV+5pNK4zLEpuP

NpVpst4liyiiD8j7g6wIFIhrrfVx0DFTna3ZMRBMBz
D76wo6ScDRWMs54sRA+bxKBb+WwlKqK96AMgHQ+ZybbO5JJ6fe2JPOj6v1fJiVxJYkjd5D7NvnVN0CuZ

BBy+dUPpeaQ1LC5dR6t6XGzQb9uRvlH7pGKs5f6qcL/8mTAR99YSJWNQK7XNNKGg6Fpc+qyFY5meaTss

JoqzMGlyydlgjWJ+asjTFkZ1wk5dewHeM3G5pAufeW
CKwA89D6xd8bgcKRJhkOWBsXfLCVoEoSsxkRtU57oFW8ovwOCLNmtlyRXYWwvD9fb2cAuYcaYFXAxa0Z

+ocB0xmIZ8AoLsofUpufR/eSG7mcIKN3C2hKSPSixHtCUEnU6sBcy8elgmcOK5LBWFAInLwEpY1sGg38

FEuOxRmKvtH5BtgJRkWe9ss/IuNmPgftpIe+eTVURv
+TxvrVeaCdE0v6c7K5LqiNgQMGoBUWsdHWSd5TtaGAt7MYGBCOrnY8Wy6cfMKDXs8GonMtdoxryRjZdI

EdagoctVHZ/KM5AUln8ccJRbb8w08bcCiQG6nptcopz6O0iKP0xTq2PyEM/PXOj35SkKApAXj5qxpYe5

ROWdB1RYV2N0d6cE7TGIShkyRxRoIFKdk6WeeyywpZ
aop3lGf90NzJZ5nvyq0FMgwUh3zoTAnafaq+guUhhn21gvDqOdsuatf0IfEWmrGQ341HpTcglej0HzK1

V2dgut6yX/EN2E7FJfhTUFo3d7JiVzN2/uIDyvgCdJ8jRal+Et2BFU1GZ/K1PJpGuIgECYFYa0+94xy/

nK7yrEwOaEelQqSlGCd8Ehj25LL+SRrCq4ZaZ3K+wL
SIpB55jpR5AQwIlOXgM3Y4wA7kFtimSE/AscfYFJ9qIJtV2iu7xGxXSBxbN6uE062AU1zT8gjZqb+3jc

GnCAHTMqQZbOifX6Le053QqzittGBFW6+Qxf9d+if0E6fIWLQ6hY4gtJd4fgx0TqWUwO3T7nhV9TUt82

v+1pK03uy9/GkX09ob6HSEbFSfsfyvbQ4fWW9Lhefo
l/jSy+kG8d/MHHFv0SpQ0jA9yymYc8sQyYfeMzpJkPQX2OIiTiR8pU+JHL/qly8t1LzsHyRbXRzAP5ry

20xIb7k7KyVMpooYb7msuOEq24NEEhcpw9Y0f8atdrY6hzlu1rA4R3gD4SwynN0AUqZw6pGALyPM50GS

3wh3yl6/FpS5hIHvUpUchtRNpuiPe+g+tgADuV1O6a
DBNcrBF7oEyEdDK1gNduYhx+lIvObg4upQtML1gMNC4YliBdW+8TIy2pd1T4iMR3GpbBtbN5hnt7JIpg

Wwk1Tps5asT/Zcz5Hr8scPvGDvKjIhbeOcNPflzjtaJuuMk5OkiEY+ZTCizsg0P8VRcG0ofz3O1b7JUM

lF7FUs2DIosCd8ZaM1pl15fFrwWQQARjkhet/DwQNS
dOS9K8lVMU22y7/adMycrRdoUu3JOEQNc2j4FgQ9YOg9G6n8ma7i8ly586RQ807alaqK10MAgwk7gfLr

un8qD+evKjJcPfMAHTlgpoYGD5DgtoNJZD+l+DNXpEXpqszy9YADCLfZzEjiIiUFGoQ6wYuwBNU1g/FZ

cPqtgWsX15YcFqrzc42YJiHyet9c7m6dyXoqctAeFg
L2Dq9IkAGgt7k3uOPKO2bZH+f0wuWH0e0/2F70V/Ld2E7LY0agkI77RPLAnhr7lPhTD7XNpWm8jn8sQu

27NolIXo3kwK8/4S96O7w+4y2lTA6vEO4WBI81xPbREpH810FhUONzRGp6+lFPj82XCVSIk3Q4W89tFT

jZF8CkFR2leR83LoT1DE/Vw4sOQZGQMEXnw40rs9dM
UbstzhjtZ1wJZBuv6MnUgf90NSL1f4nkQC1cyGBIs8jswfQNQ8sHiHP/AhKWHrqPFlmr3jAcm9YyNI5F

beHPRg7OnRE7kZGMWSiRXy8XkM47Z/jCTOI74Y6wkUnk7Kvy1ZuzUXz88sOOdSTPOf7dVci8RVuUTrek

Ux3e01KLNocLS1EjHi1gVqGj0qDTcelqdh8cpjzvsH
n76kPWQdBRdpIj/TT9LQAufaJlstCVB5CXDcQfRTHWb9/2PPI+2z+969HOZW6GjyMbLt1OWnqXyuGWxG

4lIFB2ccRSMIbLor2qmhPnpauKp0rxGa2tZr03lT/l2BhcOM2UkLlBK5D6tXWOrsh37aFf32G/ceOaLJ

++UcLbi2Le5kcRK9EdSid1x4G1eyK6MKQxijaODSI2
h+syAqi91RF5l69/G0dfu4JiRA1NabOpX91HKupzaio4j6goxvOFr8NwzVVbC3pLYrma/EmSMzBghe0L

7Wp3axXAci9+jhRKxfcFpGs6HhwZ+aRrpHSVcsymlOYAMOJ9csIAL2mMN6E1hgSa54Kx6IK9olIMesls

ENsN9IwJbC5Yc4eiOPLybCUz3RO/xz1q6/p3QsvdcH
/myGWAkSUZGk9ymXrq/a8C91X7fsPxz249Bim/pqbAELt5vyoddaw8n5X0/SzHOoiJFz0yuYW0IZKkUn

mV4UuAsPdV/WxgKsxdNlowRYhh4WXmVpPcM9v6Eqs++aWdkH3600EV88d6E+5VJZHPV10z+vUM63oEpl

PIWceDt1pr52opNWTJDKrpbtYqnOjoFKYEojRcdcUU
2Oui9mCU0TrGT6Qo45f7JuXzyAmUn7qunmTH4/+GBVu3Kw3r0jAQdFCwsFsSyMWxh5KoUYThqea45Pd6

fiVjVMzoWI1Cu/ihw5n3+1cctVDhwr8Na2vAhqfHBYhCX3Spxebka4aY5UTW9mu9qtLS1c9Mw2M47IvW

lmNxfjaLLp7zs6ekr+eeIDCNb1wvjV/4Qbzs67lVJc
Xjth/rHL9vljJLZQaUq/9E4cQ7FqyireKYL9i8vzO/6n7jPQP4YMd47IMZjoDFQGnSHZqXmMiW05wjRr

OOfMoCvL42iOv4sybWvt2fWahKlCHTC2qvF4poCFRF14jLzpx9kUG7++hj/U+4gCZVj3IMOpg3pm+frl

5bJT5sMX5WosD+HaJV5Zn7fE3bsoHvtEl6mZfcQkMr
EdEkiKTfaxzKE1R88YyR6i6oWa9oljA3+CgKAKB9/z6Cb3R/l5+jNPDM182o4ieNgQShBIxG69mUxhYQ

LwB6KoIKYQVOeJ5B3eYRLB6zA6b+ytj82U/4yG6HghFZKLtzblY8xvFxsUL5jgyrONN5sL1IFhnZJSpy

BO5z4E+KWQO4nxdvt3zOQSrS5riHx9bcVk0Q/gFOsP
WhF0pkhXTcoR/eWMbADctF9QQLWlbrcQAAT2Avqgq+nrSf8As7OJB3GQEILCoMnRRwCA4jUDarKvjFTh

jBCJKLZhUha/9fNfDPXhO2qtoIkQz01wQAE4TsCQX9jOger4z6U/YKGVLHTVPeGO9aXlQUFme18XK8A/

doSsxKR29tUmdwG6Za79h9uGgrSNzrFuyGsc8OL4oG
+/um9j23lsfONZg09v3/fC9OlPY3/igLgfH54Zk6OobX7kxkCMPPQeSczZ7C7vAzlrDnAVOHqjdAY/sX

rT3tXgXybPdT0YT++m+za3+9JHX9Q+kfU7yEqe/n1NzVYPOD1TAKGisSGHhRL6Ga7s+G6+8yh4YhRBho

/4/fLlBkdRi7VOUYrP8J/ajbKgiyyoRPCycpCmu0rY
tsCxa0Oo3F/MuBMqRgnkobCZ8yVg08Lij4U/b/ZmzVhP7D605mNsh5FmRZmK5dVYdSzV+CFVXRwOSkUF

lX9mEBGBC/Gq5WeMON3o/AtRSP/wVUU+cQTL5IucCN0bJrQKCoFEFvDVo0p22v43Eo4sipGtTS+iCg1e

atME/H9eH/s/0CRh2P7KpTI+o0GyY4YtBGskVQ4x4j
fZ98HJgBGVNDJ7OLFajmsA6mtlDgs0rTYeBMV3JavF3+chXR35s5x6ZTdtspkE1mKPO6Z4zg3K7/u6DZ

kxEkJw3KOwLU3lvQRMkSXNvtO67cI+qFVUxlC4+WWt9WvUQrP8b+QOHlMdM64s1zFuaE4ylOAmahUlTc

0vIFtDna3zjvdRDsBwU8+MHHPJQjpWEzqVDOfRF4VZ
pg96QaBpSl0/768y+Ivsw4mo2zq+zOGLtOET9uy3RTOb7KV6Xuz+XXt9ceufYso3zpMhur89Or3e3VOV

2cwAXZIBxG/iFPsLA2NQoaNPbU/dv7152TZ4Mahs8tq9ypOZ/1uQk6KPi0N4nO9qfmZLV7u4AIxIowFy

tqtPH7TQu2dzcTkDyyTpKDWOznhaY09H+WzFqCZlvi
UX5n2p+pQ2j1JAig0zuLGy/JPQMAzvXRdubigz7/cIJ0bxy0j8kd7v3QnEdkPDe0fDhP2EMtmYmrTuyV

wvyUgScpCb0PEG2HwkHt8JAIXtZHzTNak12oVXWoYd3JBkscuoh572zAdtmysQmkN4fCPPou2vY3dBen

MHxP798Cys6f4T9zXFO6l+j8zpojozJ0fRAkdPbnTa
VAL3+RPvejDxc4YEy8XW6A3Ail1TX4C9GHVxnYfTyjE1oVPAbkzAI8W2ZokCvefItooFvoz1Qx7xP/eM

dH8mE1gNfSPsv0ccJ7CCVWYrsSRyr1UJhfMJEpa+KUNZwBmwF5eiLOB17XJamqWxfbyzQZ0U9HjTqbLd

8SsezpbTdASBMGqBgxr2CAYlNy0ZMfdc/emgV/S61Z
7652PsbhCunRhQn0y3cxgtaIzm44D4GCd4aKL5HevuH3wSeWcC/dnUpfm4l5At0OLj/6vu0D299nBbik

coVGbmLkr55kFq7CY+P2946TqcnRgc7Rvwkx3KDwmqdmmNE+r14djfsnfldk4Gz77uNYbkQFujR7hdV2

RydWVgFgugv8CvYco3xZVBl8zEM3wDMI7gYq8WcHzz
KL3Y/+0yNdZI+O/MldnGntAqY2gGwEViLA4wxr7lajMzxoLaE6ni4kthZsAC9L1nv17uZYrBS040+w4Y

+9ZpwB5ArFbLBdY6o5E8nyT1/L4MgaUqD6RAue+JznPUdOtmTv/xnkG6DTHrO/cHHFwjQu3b5xJNF+vm

4C+k0xagus3ZUI7XBT1sRbeY/Hc2MXAe4S6dqZdIiw
2brR92vpUUunk1mZKeR1X8RiBLqht+oAhPfIoiZ9UxXtI9f6pLJiYhEYHaq7vj8V/podpaNnyDVFmWGY

LpGnEJe5Vt7O+RkTeAxujqqzFELNW1JV4IUanEQfzwuAz/paU9wNbDvf8qA1tTrhYTCd16mWPy6WzL08

ZxPLGdPf0N0xec45zdN/ewrDybvU1oUOx8zCWAbSYU
s+eNHv3hI2gL+UuWl2Qag/7VuYWdz2NV1uxuH12Pejeb0H2tYj6ZtqV5jEZoAzst5wb9Pe+zs846Q1KS

il4cm4/aPjNXs4yhJ6llGSy9xs021C3+eh+DkNeTGddlQTbu1MmOhme+lKZFDxy2XDTJiotAML58FLFV

XIb57bY49yVOQSzfA3LXxgB4zUkJ0Tyf+mrAayraiP
lx70N8MVp7PllMpiA4C1/FpncL3aZqLNaWs6/GUuxlbpUF4ybPvGnObcH7CQaniZ9MZG25g+VnXfwSaM

d/L918U+9ETxB5G40MTOkZ4qjnKpyDmYOPtdDuWhwwPaFlUisq2F8ZsHsKSucbpSdUHNfCPcLH+Dajkc

ph4lq7vX/x3QnkvndwCf4UQ4ChYECvuT9etGFBCv0c
jffb2Hyne3GlRGiVIYJF3sIOydkjBURbeKfM/eokp47SChszFTZm0JSakaQr3kjNdI4X32d1cnGoAdoY

AMu4czdJKvF0bx3czbEp58nLYKnon8zay123JP9gTPCfHeyrscs/sysHFNsVKBzCnluli0fObH7DgEvs

ORWMV809amMtmcH+zpPfYLUgCnYmt7k5/Wm9RiS+/s
JGDX18Dm5f7L3+DuRfjH5jACswzVjt2w0w7OqA2fH8jOp2gfSC4yzqzwa9RUjdCRoy9adhX926EvxKUd

vU/OzqID7ilodKXxmqQvAj4Nuuo28QU8jH5YI8vVrOeD42iP1Mj/0DsBR5O/uHWbt9deJhGlKIoVbN15

3xI3isdGaF9JDdblkgucbc1u6X+xWURMXwp6EfA4eQ
7Tk2DvGHxqDNY4QMvcpnbgS9X2lYBU0JPeCUvzUHb4vZZomUTdQBQy+1AqcR6Vza+s3TVc2v0sYV2nGR

MnFAjEFN1Rvzfw2ZZzNVKBKrwxUU9XI/0cBN9lH0xvbqvoxgw/HK4NQYS4HF7gHQbev/+cwjM3oWxo9J

4nXmwZCVRoaznNPFSjVB6F2Bpdy9yOw6wX8l8onxMZ
8U5ecjAgQG6p1SMm9S7OksBYzP6xPaBgls7eH5GNU/19Q6ckpsLNCkJwQrI/003kEY20IWpu1HHgFwtx

SsYzIC2HOtu4dlW69oBpmBEc0Cq73HMAneSMHtMvfnJ815oHITbSUvorcQlRGudfrgOEcEWXUY4VO87z

EBKIN+Fe/ejCBL7XZnztN1YxsZmlQAbiFqa3+Vab6c
RxEs0NWkhghZ0iZhpdk81lyhpFrE8D9EmaI2tITTpjzc8Kvoe6ns82DdQKannO+ERFXqTMH/OMzDcE8v

16wlSAjxXvl17wKblR5LafrJy81CQ5C7drVIVrlWrmO4UuwsWwZfeh6kmNcb2xdQ1Us65kCNUfkIJSRH

csihRgvt7sflJ1O8Rz21m7QZ6RCKMdJ9AmcCUk/wza
siotgx2WGD7WwkjJ6Q1Fav18J7HYVbkKUw7ZqMYAfAiqhCwr9NmG36st7kx8+QRgj94tSEqmo2g6ZmfZ

9iiikLpBbrS7TzKE726jTZW/X3+cD1phpFDWr702DeCRUTGagCN4AUxrAAaquz5ETvBcnb5oOM+F8aLt

vrcdyyUVHuCiTPYMY1aGiSKKm+1QBpHqdcBPbtql+k
Y3DcMQCtS+kX8Xm8dy3vKT4lA11JGPLPm/hLoGL+RiY3Sfu93PYTTzH2co1DjneHQDoKQPeA4BLKfYZr

RtOIqyssddnBZzb57FZkg+1lleAUR6dcBhxDWNT1tgETUFprfN0i+rRKtwejYzY7tisRvCweyRrOldRi

FYmX+4hUAqTJU60euKVc9FRPE0pWZWuDMpu51EIu+D
OkqXmbH9b3hnFhc74wvdDZjB3hFPsEO3s0FQvHZTh6qJ1Ljt8rl4kZrOX6kA+xo9eIh+Kdhr5Sbia5Ca

sD8ZdW48u9KTabrfbq1J6SvK2ZRoagReQz8pSkbjPh6bvwRGQ9NZ5FkIoSu++Z8eq3/XQ97CE1AkyNDh

gYhm1iEJoFvBkt9otR2Eb+UwlN9lV2xeAUUg3UekkD
Ej3s1YiqJq0KZx1olmlJ4y02/DWsDomMz2r9YaFSwTF+PiiSY006aJC4t1AuKSjktspPGBiVu8HnUOX3

Ula3YnZjHbjpQWXydloCiHDpI8eeap8dRYXpM4wpa3eqcWNCFAl3xTlRastmcf4Rdqaf8d6M3VhdKuqY

05ucqgpX+WXSVpnzz67zw1xq3vThqvC0YRJCXeapn1
gm73QDbku6d74DJ3pKuc8wPrRGt9fZQ96r0N2CJLZslFDpa35UU+70rWONeYQ4xee8yTcjmdy9luv5bc

8+yOnKgQWZb6tjVFfVuHBHjvI9bhQKh79xoU5/fBf/uKq54uFeMbv1iJ8rzeX2NWhNc3vzs+Txkuf71S

LfJqDJKpqkadMg6GvKqN/QSeXxg1m7JN0j/5bfhVjL
f6LPTHnCT3pjk/wHoSGHujA8tDEznX/viQ66moPcJ+qHYEQPGRpewzoDh76kCyVJSiJSttZdh7InrY3r

9D4W2W6tx2fhEs9BVML5HKVdnpTufDNNd6ahZQ48pjlc0LqgQ8Z3uuekNniL43MJZ5xFyB7aqFbTE44l

jPo180GZoGcH8FVclT/7Wm2BcnICr7PeOjmjDs5jhQ
WnlZ0u6FDktFADfkHDgLkJN4TSOD4DsSoHj2P+cBs0O87hN2f6u1toWa1Ky2LrhlWVfs4+Ytu2DZZ44B

wfaj+kVsY6/Wl7YL7drFuOfdJmfe80ziphu2BffL3XX7dgpz/kQgsiboE35r8INE2JCShy5Mn7Lvj9wv

l82cP5oDJXn2hflwaS9ibV25ltP1dkkEol7QzGM/if
30Ds+mmDfbKkSgP2KbpTFhDRCp4CgxKW4G0bKT98kbI66wv6BEbCA9rweIX4NLf3O6wEvHemHDooZw1u

0XjB319J+6hqc6bITxUrpnvlijRn9bwH+lKEUlJv/m62/vQws8VVYu32lm9qDu2OI7N+8SUbsrJ52lcM

EJQA3rVzxzfuJ9kPxcIJyaNd5fwP+/oZUaMPpljpsY
qM90XG0bVe2P71+bxXv+JK2d1u1cPZfjLMEg3pYQP6LmtDiGaF0UkxPf6C3Cd7+iwfoLeiXQpbZ/gkLF

PqEML0yqtkxcTDZ5gk06HLQ6ROrmVuAUWpjRv3IuryitF5DCqK/A8zq099a7Phn1FerTVNtg4L/VWg0+

BfwkJ6sdFkh2nWhlgjz3YzP6mQZexupwmQKGbqOTAa
hqWRndtFCa0KRiAZheZBV+ssJesmyYqfdv2Qb+lWicPXzzuu+u0VrJFo6ZxmdlZnnThQp8bsUB/

z7OYTBIZCmiVsiiSjUnu/R7nAs9elHnFXTd3t+22YEzh16bHngjEsBPaJFVTBbNQMRSaWBmXVhhEE33W

xTRa1pvR0hcLYPfU32BvF5uIDraX8T6vKs1J2Lx4Iu
NRHP3Zeq3wxsWTTPfXLHnS/R3B2T9KWRNu2VI23tXfz3SzNVMhoQ+DZ3yLvSVulNsoxD0inpFDRo3IJw

W6+TpW800fqj4nRktXpC7THpOmrEweEYBARM11908y0/3tAGrIrHR9I0hZe8LjIFZNGPZw2QTiwU2mYu

dAkQcqfAz1MeAxCzQz2iar61/F6tr3Rg0HeDLrV4lL
nQanXTHHWKrnooXxbyAU0OHbtLNpGZzzbT79ZkWbDu68Ycvggeo9lyRnaXorl8BW3BKZfVNdlgTAcpyo

oVWDVBcwh0HqcS/Bxl3rXOdYwX33TkH7fPyLx1y3sFaCaWkScGhnQeMBEb56uH0nBmHQ6k7aBBoIwxdX

gj2GvGkfvDyjV3u7Uw2NLxPRdekqqvQWJsUsHhTh7A
QTWFVVyRahf+Bj8aG5AtKqtGJ14TE2SNmnf+eWcWr8fTWWC/uZJBDpjwkLPxfyGON+k2S/zmxbUaDabV

0cqCveSzuPKt+hKhLR+kERO2AarN7p1u28k2Bq3Z2s3kwpym9aVtse0NGSeIIa1kgT7AWU2t4r9Tv4C9

XD6nKbiPG2Gy3IKOppn/hrpNZOLXsDd1E950wUjhwz
rcc8Qazg7cv1G2RXSV1Yodk9nXqj1JWjs0r9XqiWbOswqLPEd3F4AQMSHDekZyrT2KRTVF9673W1J4+U

V4/XcvHh/gY+yisnM2KGr7UAS0s0s6z8xzOKnaniVmbbf/Wd+ejDa/NBfgwe90H1qfVvcEmJKXe++aNq

KMr6ZVTsb4dDJAve28bK/yIwvGdt9SI+cN0LwIyF7f
1nJ3/1LvZWzK+790liD6bkV2VR3DliEBBmbsrNf7QRftJyuh4QQbGZ2foiNepIOp7CevfH7hCG7hVY4E

DA2CzDZETJv+GaEcbyDDa5uIpgqGbzin7VlGSbm3Fq+11KEhEyrJvwucgH1L6UvZTuxc3Uu4QcdIrTeL

irvQm1WywihugvH7zXoVaWe/heB3iH68vNj94QhrH+
16NAmpl34040160AnvxdEqs6WeQTQss070sU77cbcPEs5HpbBolh8mSJfS3mmetO54yhVD73smV0M281

QUC/fdjSOx3gi+zv31KxT2K6EN4q919MEy1DqcGvl+cmtsJivHr4Ep25uEovk+u6W8UCHysgf+gxjy7J

BuGQ445x0BBgox1snTvcrt9fRBrNSDd7HEy6nLDZ2L
K9luib5FovI/NcBUUHd4x8EZE2bh0IugDEunE99Jue/st6Da9qxcvf7hPPf1GNsOKI4rFbMAU4i0RETX

1nz1lzP1SKEEZYTbpVwikXXgBMsrqb7Mp1FP3w11ulwOavw9YjFCIYebvRLd7FeHz5odgwZcUhxdKZfw

V5q9CAckKihHBx5i7QIVSP6eT5fUpyiK0AKqe99eil
Z/Gh/ylKs5ki6lO8GOAenwfvrwL4HY5pfixFljwt+FK6EIcdnbnSqrDcibz6l/jC6V4/1KaGoFd9E/po

4GykeIl86651bM+cX3sKXkAvL8VdD9dDKE7kGeiKe1nx9G7Hz40LqNXnmcGDrHq2ZIXc5kebBSPdLhQU

flB9dwR9hYp3teVWELyRMTUiABIDuRc2fU7BcDBxd8
2j6U0Ucw3p+W43aewlMSWES+2KvyYYw3KAyU/lja7F0W6pYnxfJsY808r4i+g3Abbkl/FL1ov9cldFd/

deB3KCWZ2/wt0f3+1ONGLoFqtCDiK7zciXZML8KeeHBn0EQJcXv60Rl6jG38gAf+euyTUy5kICNEzWEv

NH0DjAgp0b2PzjsurDn13Te2aBQX90Af9lUE9yyreh
H00HEg0TgMCI+kK0pD69S7nRdCRq2ewtH2IHAmFAJ8YTfiiTJ/8yEjzlifPjEziXGmTXdzgZjC5WfZhM

f9HnSlc5FJ4RHOvshan5gtXwP9dbbLKUajYf8BlbqmqspuZDjKw6tubxPAyr7DHlTt9ZzsWKfEYQrg0d

Gz9yWjMvdAoG/VNupXrluqkV674jdZjN/OwDNh3G02
0fz3e5lNvZxY0/cgC1yZ95qH2knFtzfLvogENfD6imbiHJA4HdQcfVSFqhGxdCxehlixRZZiPrlKmuCW

xjKImF9ocQk9LyorKtZeOHQlBC2CxyJ/EnpnnsdnwbGUu1CWkDGQC9RUjHP9k8dooTdnIJbNfd1lbRnZ

3pUT8CkZ3ZE2QSCTdZQ7CPkgINLielZdSdCstEEfJX
y5NSFZNfhn6j5fwzZ2EdiprhR93NRRZ00h5P/dyZN6xmXIY5uMlkVpsYa7w6HrUP7oG09twDeiBxFc6B

35BqI81Y8K4XflRa0vJjlej6f6M2oyJ6dkDh32edCjVlPJwo4Z482+J4gZX/xe6q5DNURJSPWyP3YgB9

eCbGmNcfNfhGl0ekKsrIbE3EvUJajEkR1l0R0fIEIn
p32Mqp1v/jze6OfntVprXBD9XGDUCP7lilwmtuJtbjSiwT7rDx7tCjMhsBFLQ8IBw30RpfFg4YaVekNY

YNnaM6+DN/efe2r455c2U89FGounUOQ4TGERV0GY0nC0rYMuUqsqsuFgMdwsKUbGfQdEBJ2bDI+HGHvI

9M6bHALAY06n1UMsVJZgYoiZR0pvGE0p/z+6DyJOg2
yJN3oy1WCGdLwzcwLm13I0clYx8kkn+vyD6fgBEcw1TrxkL95fQ/2gMevqBSuTjzvpqm01nDn/3L5wVm

jV0r4U+GjasGYK9EjtrbuHLFGLoGH8o05KCgDYHnvZRDyaWb7SRgtqMbJ1IjaO9Eki21+lUXZqak7mQ5

5Mhd3HR2VW5OvQRrrfDswq9cvcBQ+r3vT5cgNOLtdc
WbM0a+aqnqHL7WvO7IyOGOGHqMcRrfW9w1s9E01L0JnAzr/jnzCm7dIWF26LJAvtlB+KfJesTNu9+jyW

rtSZI8GGF5oUOKWO3CjgFJzhAcWN2eG8ZJ2sf55eHg1gRXnj0MCZCEEIdQPelrxxxrTiouyYgUnciq0T

xLrW0Xl5Kx8ZZuQsx5bgcmLfxHO7iQEvsJamE+3vo1
b19HWzsxqsVCLpGqZA2j/+3J4skFdl70zOFcQ+kTrtenPuUr+dHSVA94Yg0vHxWe8KvPVHHtGyct2y4+

/YkqZ+nrJ/JP6BGJSfstVQWXyIVzKO5Nhn+KHknvapS/OTV9igz8kc9zuX56msg8SVFXTNqvGumzD9mm

nRcu7fOSuTKjADCpBWHu7BF87KLl8pEcwGTEgfpgdI
5ZhBia9CJsQM+DTvRAeHieu17S7hjZIfkTRVnNRW6Hov3Aw+AnLz0bUjjxcM4mGu+FccyiLjqlCu60Dl

zsTofEidyLOPzZkj7YTIjC3xq1DvAGojzXnd5wszWeUR/3j2FxS6hQrt4pCXTdALO4G9lwK18PEn/tyn

o6PDq/1RUWLq18twWVsDTDkGwPHvqxR5Bi8Zplk/kG
j5JkfmPmumEdQJv8mj0q9dz06tW/pFc3vplNbRPti6k9sbyvR7JyJopaTjeO/vTsyz3UT373FVHgIfA3

qb31WkFDi+oiCKIBuWuVNcymo02FN+LfAP4Xwr0SZUD8D8cRgZ5sLuazS+jDdXFswEic8Px1GcwKKTFd

Mu3zfX7CNnAaK97fJbb/KRmmh2+zkrAJZhpIoa81qj
8hPLdr/GH/2F9VEo3auOq+h7BLQE/3tGKwl9+5Fzdml8luuQkyxTnNQQ3bpfdM/KQiwOCbQBCDXcMf2J

3rvounX0nNt7iiPnascHtoPAXKLD2jWUIeM+folp7qij+bg/SqtNMoCFrAb+LqHa4CtecvvgWY6ZN1OP

WH2H20pNlBAQQHqc918UWAoq6ApaQPJbRQ3uxD0naC
s3+1cjNGRBfA1q6Yhou7g6ScZ+ge+n+FmP6WbvMVT1OXHksEdENQgonmL+UmhMOM6hNiGssNuBaWZap5

ZPWFQ1SU7fKduk3PqHbL+qQEVU8u6QVIbbo+87XhKTgnNC1gVPztuWL9fp1Ym28+ucnM3XdyOARN4r79

RQIs1SllAO+RZAQwk99OEiU4q0SeMy+vV9dzRYlfhX
EQrm7NHDgNT1wyuYvZ9s9ag2WipazTdJh7rPBhNVx3y0/5Du2sZCYmt4scnOy2y8vc6qdrgtXiN9pGQt

2UFDqZxjXJvbEHklDS1tKBPIaBPoLkpeFJPcM3XynlfqmI9EpQWPjbYVLmx5gqGhsdtJ3o8ImUmLzxWw

FpB5y7o8tqpk/e/RQEU+6OzKp2yFH9zxGfDpp2JPX9
mQQ0o5RBU66ECZjEYGHPpfmLXedoE/oQaS319uB6IyvxMpQvbOvqjzBfN6u+B9ftw5KdnE1/pIlq/yWU

sEy4vJ2NwQOb1s77vVDcxrT4VAWVejfoAz62RsfP/QFq7TN2+YlkE0PEwI0q3fDXmV9GA9504Dqodb0g

z7+T8nbFNwKVxROzUvlJks+pIKHO0XylG59HAJFYd0
udXp5fgcqn8WVoBhhpGj+kwzNwVP220Jgx+ZBk0/qS284h2iQM7ExnU31gnbOCzBo2cmPhoKCip68J9z

St05Oac1cb76eJ3C/Ms+u7ysgSErtd5Vet+33i0e7wS92Z3w/RQ60WYTf4fKm/JBMxo+0kvGYMChJbbz

CbYcu1VUETRKzfsMsypmghGqU+JsvGtoQzyxlIYqjB
+c8lC9bqg22XYsCWp2+pWVb1LYwHtGRfqirYvqVXmDWyiegQoVY0l2WfkZs9a22jKj5wh7SLPpd14sXP

3vAPNWDoK232MxF4cHiNhXlN2+bnMuOs2053FxKPl4T4sB+VrXvVhB/0LrsiERdTam79+XJ1bdFo+UGN

+FxFIZc6YTVoqUa5BUvTfc0fhMOcisbhQ5kULLu7qz
8BjVogJqHaJSlz/9WgW6jD/rx/UFUS7SYxbzuFU3zUP+STucqfiy22VZRvngtX5k9tLlpv/c+6wBRDT7

T6x6deKaT2puMzJwJcKk8EwUQI6Xin0mzz0EgvdDXk0IWW0J+I7HzLzfwT3i1ohl2ZbhDI82vF5/Elizabeth

Od89Mm1ddW0f8xNb9YgLhLxzRCmzP+gxT4gcu2lzWo
eQQOvJw2zAR5SYXimNGEx/286/qsqFWsXlDsY85+1ClALRdXMca/FX6uKo7+6Ot0ZgT+e8FSnN55g3uK

BgbkOROlJ3OV/y//03E8VD1mcFuCi7arUlXZS+K1YZOoGoPTUYqieJAi/Rujs8l3T3i5WvopAeawBoet

Eita0xR7GwmSISMMzJJL/w22j9iJGXixli/Vh5WpoQ
BrZGeDtkjvbkyzKY8H4C+gjAQlou1g9eR+lFw5ScYUKLK955PufxE8x4kiTAm3jI+xJwb02+5u9bG0y0

4sbf+Zk/j9qOvbN99Oe5csqoGp/pk31dM8kFjhoWLUQqtOcQpDoN2KcesCdtBsWUFnMydkM/AHI3eroj

fcd7ivqKIjVRT5D0deo8+b/ZNmO2fHxsJ325F9ChdJ
Jkay6/vYAsscuaqYHvdyRhFQZDZO9gHkOBNxBkuHjwSdatVDxOUZygeQriRcw1kV4aNUHxYn1b7U86Ev

FPwQcVOOUc9vL6KPczphF60kpQc9DLsMBJkF3FnPCVSiNT2ioI1VS2vgbnTI1c86Zn39i+rX0Clm607y

XDEvcU5lgsG2qQsFoBpjREAbzcWp/NCCXNg1f/Vdaw
yE04sIDTMmmGmg7v9zlxF5ORJC4/o6QKQLaTVnKzZ0l4odRth+R7IY4AIPyu/wpy/ljl/rZ2CIqNXKiy

Iufz7qxDps8paN32W4ypvnSRsT8iJ2x1KgjdmBlxim1O74WVgaTBL7xgs1Do3fx1jy/enxvCouu2+Qnv

1TSVKw1tC1adJCEPsoXN2+SF+6iK1ZGNtBoIs3SsVQ
BSs6eTg7BiblfoUW2YoHak0gKRAqJM75zJ0vWWfxlVuz5N/fesPI05KgPSTc1hS8V/0hvOAMrDbNSgv7

HFagiAC6xhWjeRMb8GolHvo2OEOL02liPk6sFU3T0XWXoMP0jnBC7aJimleKaRHwMgs6GqgZRHCBLqEi

DXrs510hJRnzz9ea+JJhe4sXllHbaIv/1J9RZiPoU9
7Q2r4fBoMKWcJMSAPa/IT/OcKC3ngg8NaN4KwixCnw0r9p+P91kOe+thxVCcb4T5UwXA5sfF5mJka2zR

S+gBveSqcemcOZTR9LJgTIUsQe45cJgKe3nbUkb5VHNKXvev5YxQug8eLLREpQ2geIU7mqvknt20pfNC

1NwkQ0UbfyJP78nx/wt52AdlkEe6ghrH60GTEfYIWK
wvI9Q4iGOvzcBUO/XypEQukxKhfny/55ifBI8DNRPVoMFiw3wY5VNv3ZD6kJ3mAfRV/MKQaT9SmUVkC8

fZlt5Nqr/G1aL/vBbkvi07UXlyGlkNGqNG5RmzUNIGXh2TcEgfqWFcU6WaYnSTjmHNyRSNi3qdM0JNXY

EnVKkAm17dh65BAWLxs20hXU7wLLgVbGk5CJ11dxYB
AZUNE+WK43s7suI1nWvmCDsrahM6esNRJeXNtMbuuQFpmGe2xAm/cxZ/0o6nABkLmevWR9OT6hg/INQs

5kjeYucxPS/0NfL9P7BsyOe0jhrTLxoFzhNGY6XVhZ+0gekOWM7xnuAM4pSB2M92KFbCgS3mOtmC+3PX

D/y+RhJJpFHIR/peNKbLjXplEvbPdjv1m9+GTo71L+
lY06grbRxmCNTVdSql0suPtn78a3OiabDC0y2uZ+1SzWJ/63xYXR4wdeZeOKIX2yzI1v3h+xy/UhioO4

hymvgurExxtZYvNKTjlZmXSDguyiRCwa5xaT1UgzlMWt+sU5V9SjS6I8QXiTuq8/+5Y2lxjhD/iHCJKP

tnJlulfbAFI25rozJvr+bQfcln494G6jxZ7H65x79T
gqL+I0hti+Dh9836NgCoiJuzAdhSdLfBdieoXQpHu6lMwYLmn1W1YbIreGSOeXAe+VJrvOXhJcqmDFN2

YzOmlvpZxxK7loyQIxBQXtetXU/7l2kGQ0PA1JtOglHLXWWKhOi51QF+/zZCDL1mFFMpsXnOIr9cPk9y

iwxT7RLjrI+OevjIbInQiJS3Y2GJKmTwCPlebDPRf1
RQIcNh8wq9Cx3dUrDFh47ln2SHcpkbwXTMQpegZgB23Kls3DuIqU0BNkeov3/ftZ2A/I7WA6AdpvF0Om

PyvHO5nFQggJxByQYyMb5flff7ppQzUZbe0CoAqKNJQ0E3My9j7lTi3j4eZ828j7g2Iz1IGrlkJ8gWS7

w9bBvzjZFE5i0UixiMeVAtzjmbHQ1lyLLli2wnKWd9
APj2DySHCODkdUXGTyIPydsdB3NekaI1pu5YNyvr+0fw4/XgCM+sH4xRQWMYROh+PvUdEbzFFtOpe894

Q99caNu37k6SO51vhKHNi9LffWoxG1K6NKqyH9GQ4BWa9svIGkxr7uYHG4OvVuUN8tj7i/GwKANzT30e

4RGSRENIhzaXOx+gnnGRgxkGIDlsWbSkXmrm130RTi
hmjJtjhAJPoxWZMxUrn1Cp/HMLIBbD2NPyMk/hK0CZlx3NtugfSPTeIAqp6SCIrq34bVPp2nuTA3l6lM

fbxs7K+LRbgQwU5p7GADk+ijXvq8ttJEwC9c0pdvHShobkgzJCTXa2gUBeCQy2UFzWyws1Bh/XLKTfkO

ihP7YPkOvPFVm40EnbPPHsMELVO+GmQOUhZ74cl5aP
wmxdvmhxG6u9uEVEGrR1MY0R83L03cYLTjThCdj2GkfG1xUg7sfjF8VSv5VfqEgIhZpipQWWo9EbT6Bl

hOqFqUMmL97k+eLvIgOKN7/iQIjiZ+OrrcyGwXwn05vbNT+thgqMam/EqyPUtGOwv8Q4sEn/d9LrpTxV

braulio/EoJjbYLTlvKLLeeWo0hty6zOiaIlLTOXk917Jj
8au4o3YyUnAoVsM2PM8I2BuljGfpQOgKYwNZUePKj5zpwqvpo7YSRGPGSfr5E1MgxOK+smhEOTp+6fay

90ACvrN/BwBmqnpHfpl/5XEc/MaFqaMLcLgvn7GuvtmjKeJ/rEdo/aREIH1Wgnc8Yh31pPvvybUmdcmV

GzLRAG0D1D/O2KyGCMHPy02KDXQ3rkVOqgk+bJOCYL
sFNlv/8h/ZYdfPDnZ5es7casdL3298IncLByzUahqmFl4k6lqsRG9ti5/6//h4hfRCHeV9W4gH9OcZOJ

gck1bmHuUmw1hbmkH/UXPnW2j3GKPs1bO44P/O6EsKMYyU4CnSWsSIhIPn7YEBTpckvRINuljK6soBhg

1Bi5vvmT4fhjb2rI/Sci3kRcnxR+HAll1sZIE/rF2F
prngz+A4Eg6BHY8XOgPHK9XJP3GWFlVX1oKmxVIXwqgsuccZBbuk3wUDc/Ly/pUiQo4UKGQGYNSLjbq1

FObOZbeWeFyV+F14qGvydRN+0jt9fXaT/HflmS05rGjkOl8m/zodeXKmpdw8pcvUo2KoDIncitnMCks9

ThCasZZMFM7HLMw/6knSv7UXJzhAgkmLHdut+dtWZA
BwXswPQZIVwgWpzHrtSHfjnFUW4X5hRS6jl2WqL/xn/Lcn5ZLcxvMG+YGcycSRLj+ozMaYJiX+zxyzon

AkhfprtNfP+TfKH0P0TZwbsd7nnYs3lipJk2L2PmJr6QwIEYr//2pa0Xi+8LY4lT6eJ4Ls+NJ3A9GZEk

5ei/Z1MA9pxnTv/Z30ngzeg5dpMq+nGMe8mzmatIw5
Rc/v2/BkfL4P5FLVGvUZrmtjUnt2vDMED+BAxbqj3lNflfxOonm8rcHBljqoCi4p3svgfG5HbyytBYAv

8ZWftXHVMq8GjH1Aveed0m5S0XjLh0xJgpxJck/dCWflyJjct1PEBrnRhMsoLuDJuntXbNis9ZCGH4Ou

xr4iVVZRnenFsdt1Jgv9eLnCaEXDNX3nT4skYCfUQy
8ITwF0AgYZdatWBqudhC1EIdf25UIPh/JY9yFnQvKQnMWUZsx/hJ6addqAqC2BleHGBbFckNOgSlI0MF

8h4aZKd17dhaFIV0PI9nsB/i/oA7rBoLirxzpG58p2AoFtCzwcR+M6PuSR+6fl/keqCciU8j8ZIFrbfg

+Ox9o/nTdMKXALoh9mtT2XZb83vK8+N11+C6vCIsUN
n9hJlQigfkbBKMLncFQYHkFOq3GqB0lyr5XVYHFfPllV6NnCxNVnSyb2dHlG8uQHw+r0gXOgRtPfSSjr

GH/rlLvqPt3CpYxQwFsQUSvw6xRWgex6lFMEO8myhlvxk8Dwx+Mi6gJppaR3UFTvDG5Z5PTO6+/dVNqI

foctNJvCDZHB/jNENex1/TLA7iAmaMbrk4dU1rh2A5
rohH0RZUGrSFyPOtk91vychnKqKCVUSINGcjdkhhW5pslgIWATi3s/DWsJDEArXr/rMFQWHsQX1sZuPM

IJSv7sdmqJQg0CTS/pc5h7a3Wi6HcsP46L5hCbobdjGscIWouC+tD78qQAJ8MKX0ezBPaZ37utymNBkc

eR1X3cNuIeC9OP9gzPoVvmysUr7lxT7o5IpJlC5wzf
clJOt/XVti3YtYzqb/OskDyD1/F6k7jDX6U05U9j5O7qOvQB8Nz0yHjpxfEphMwZQf/vHA0zkRJeB8R5

n8xcI4dB6BpS2lge4mxoQBIDg5XPQCLp9WtMyN6DkJCIjGB0MZIigCY2bjhDbs8066b+wS8/6UTFMZU3

jr69IrGa6oDJeStrwlzMUAcdOJ1CyVWAAu4q/8yOvE
uukzgS0CMYQCQdUeIIAQwrUcMKR0dG4cQJ6wACJ0oNm7HOpcu5zphUcL4oJ4pUHS9i90uagnVFsED3Ce

3SA/L5Xl6i5t6fH9lzdrmGjd59WfIUZqe4Ht9KQGfvaiI2RbwU3Q2zilWFSepskEiGLSEwApLY0gjaQL

fDf3ccx4bx4YPvuvhlwcKfZCaLgjzdGMlmwV4IAXKk
3TvAV/wTgx32PBzz/yzgTNpWn0JcCNOZzyUzvyVSbqDvcDX5p8FJhANiWQPBMJbd9YvDW91zuJX3y6kn

TZ060zLW7JWqW+BjL4e5An3SG1WzafrrZB3uE5dAGjPZ305jTw6I70ptq4+o3v1N1Un7tGJ/k7QUS2Ej

g3ydi4rYhIamI8icXn34jX1XKHgPUjdKKT4PFSfPQM
TESeiP1rl7PzpjWYTY9YdruZT3UgWkZJat0Pl2/CAYuSqv8msf9QNdq0EJdMtJ47KAmzB2Z+fqPoPBbN

clSuYaOL9B54DRlkwxWzXszL7OP0CeyaeB3/P6T/aVOL7qnJ4b8+3ywWE+ePOSfSr3sM1JSQ88x5eH4F

FyrijtasSO/Cy9zvprYstJbS+FwHzaeVE8hBEyqqaz
HTVVgcU/ClZ1jvn+5gbj/jAdEhQBoNtZYWlRO2KVbgCevGDwPuHIhZnsF2a0Ui+ncyCqKgMqQ7UYgHhz

vB3m6XrlL2KFgIVzSN6aGHAuT7au+2k2i3upiGzwHDHH8k6LX8KaO/OGQavOXr/YCWJf4jkBDtioDKGs

fdV27Io5xGwowwEdBiZYtyvaRApthtwrozNtlK8hwt
B9HQ9yzML5WXZfQwQmjQdIZmMPSpKnBi/F3oPTieIPmuFsGhgCWIe79uLo1V5/se7+Xg2GaPwywq/0r9

bBhuAu4BsQnGega2HXHm+L/+Eyh2imESr6I4YwcWuRut2/ppdXdO9lD/qcVjstrIzk83NBQooXUUAPVh

J9HzIXeaEE47VkeZG/X3qeaeOQcVT7NEYThPdKaz1c
CQf0c5V1rf9jkWh/LwDZfqgKSwdx/tU5/v82Mlg+O44xPHBZngrIuBzkmMeHOYDE6lOCOXSp8bB8MAC9

WspI2pg3le7kUWLVBWyDNucmB/UBJI0MrscEGs3/+S9V07cGblUhnpXp4JuBJTNB2/NrUN9CjAbRA1Zp

n0XqvjN5S7/3ko3o+DRSTmjM3gdcLk7gYKab5M0a8U
giaJBgRUp/NWSnxDu+7ZxdmboLXeMdtZMfTb6ik+r/8c/tjz1bdH3Q4/M054/RUh8eFtTMRtFntMb8o2

sIf+7VhM0IUAXL0/h833ExYc4+t42eBH6IhbByVLEQwuTkwdByM+DJ/5ucQ+Oj7qteQBKboPcyxKeQYB

X9f5LgP9/qV1m8r6AOHc/VKTZg8nKxApcxIZ0A4jjj
nrAc1xowMgQlMfqQ0528AB4JrlZDblIl/taAkRG4rua2Mm4/RcbmMU5AOKkGWfNrcXP14iRnccyp4LfC

4oNcXZyKIDKQD6mu3LW/05Lq6ADS2cWqtb7dg0NTsDK3u0E7c5ETTeQMmChvX/QjfEJn8hVzbr32YQ1M

QKH/gtq95XqRPzXh2sz0fIo3yREJ9a3LdZHglFsud1
WsTYEllPGS0AIuqGhKo0aT887P4O2eQDFHhnSqlwSucr8JBxKyL4BVXnEJmXtvQ9qZiMMZIAADNTvdFC

WoaN0//4nWv3Jmm04wa2HVSIDzUkdK0/esi191V3hvN1ULQk9UDQyhHtRD6VI75GaS5CpIc+7xdeD2bO

WqSdAlrw+Ze7cZqyaj6M4ckQ5g0pAMhfWxcRdz4btK
mRipv81q9g9WYMgtH8OFNbADMIF1YCee3jgWR+MiyYtvrgx7Sag7A7+iJlYwd5Lgo301bMv3/hFXvECL

o0vXz+Zs5UpRzHBDYfbsJBgkLXKicscjCuCkbXVcoww2Txz80n/bWTYPdLmK8lqolpBfhHtazyekcPV3

4MQ+144Bs8vE0pMjHAcpTJrCeXWkz0bYy7nb2kX8N1
e1zEDZy4kghh8zv/KkPfyCZThKN0TiEyyrmRcccJOD5YXmtv6iqay3a/c8kpDafUvdIC8zTQGMF6rcKK

TgA6gzR0LpNVCcHu4ISV4tZzXXtayQY7OwfUKtz5o3dXCw5s+mu478WXeLVgHqKhNgE5Bf9ql/pSON6j

8DmNi13B6GIn1iax/CI/XqxLRT5Gkm3yZXUbJXZFve
R2Dh5Sf7+Cfmh6fXRt3Np5CQx4UVGEvu/8mVTk7dg7CUbX/olOvitBzV1bCz5JQyGnuOpsd2qV9e3+qw

1iSwyHMBTcQXJiLga4D9NEk1ei3omDAvvOfvU8DF6rbJQvbmqgPNge4CSaQfrUeIT4gVd1wF/BrohbHE

i75yLgsEjRTUcrVse1gedgHLDd9svoy0Gry1/bU45u
EIEP64ijgit8JN3ubrNuc/hovmfpP2H+uYfz5P6c4gYV8lMnJJsh6jpvhEqvRWjjFUlajzSto420fNvt

POsOWIHlIs+/FPJrC2tmvcoaesZsy8bfH57GAPJ+BOS2K7i4G+4DNKoPxmzUX0zPJ3V6GEJXoSqkVfL0

Pkp3Y3/3IJeaM9uXdv4/+whquaNX/8Nw5rgls/Yu8t
o+Kwlf5HWsMPGQTjzNRA2C5WQ4PJveWaQIyZVzAbXMVOPAnWax2xAnPFxBJAolZCaTxh+O03q66hxE3v

476/7/qiae3kg5ww94OsoaOab/V9vOzYIF9CDu0hJ9gD8wX9ggLgJ2S8kfN7nHnVrQgvG0YfsrJT0aT7

SH2vIfnrdam2k2oa7njSKKJOiwhieMPEQgBg20oRae
+8rf3ZDONwrQlmPGlNM9htbdDX8NrO6rXV+vym863GfHHJ5sgzw7z4FFy+ZFRZ4xioHyhvDMRJ03bW31

s8kxjOTRqDVRisbG509o6GV8NKgTD7EUtFFngwkCqy0Cg7KqD/9B1V+R0ojQRW05uFc3HeVx98VeHlN6

D0qIi9SCo+3audDAJ8oB939FuptEKduoQgFTGkNHZc
hV3e43QMb+DetpcsDaOxZcUxK+YIyOlK1Vf5bM6U3L92zvmMnVdZKaca1qMB6pJEl+kx2KN2vsEBqWqE

N/JxXqnQEOEUCsuy/GkE3ieHMThvzkCRzdsyLOdAZni55QWl2dBTg3RwNRc9SWKOLVaiCM73cPOlMUbn

ElSrQFoA2zGm/7bUUup8l8g86jgfXcW5uYzyAXVHKY
RTer592q6j5Kk8vdrUmaWa6YuXY0kJkZmyq00zh7dUSbfDiXEc3A6pQzC69gGPMHJIdQP1hnsGOaqyfz

HrUUjmsf1pvUerD4FNQluwmUoLPRYsc4zrMD2ZO4WLeutFMltXR++m5VGk4TvNwr5YulqrC22/WyU9vF

5/R0R8UfAZQAn/3kMCBfW/VpSAfE5fon7K+jZeo9St
D5HKFhJj6qg8KUIW3pqVB2UTzRpCoqsE756NVb6xtgYhTPCmFYtC7+3mnJAz5mc07ygLZgfh0Uhxq2iT

VVO1EAsUgSBipA81S3dq/CsSN5AMUjsu8DZWQEtW91CAEnwAz8MoCzvyDCdNL+M829jVlrK2ocKdTxui

tX7lVtGRwQzrPlQxWQKQxxLWMTxo5pmewI/e8AUyaU
SCB5JAC2+w6pjsUX2btBDrU0Rrl4oljVe2E5xSaSmq2gXYKbcrCUoLMxwMS3lx3Phswbcj2lnfEPF3So

Xl8Vre+aAOlaZspQgxbDChLJt3SdkVaOzgjIJHGraMCsBqr0HnXy6stFqHzwOsslWabNwjdzk0UXcoXN

mloWrsKkeBfm2CTsR1mExapop3FIqUEXCSBNRg1fi0
rXNccsdPnyVlWg/dFJ/zl1bbqE7+vOAmdHKdRXQfLyLGqOIa8q+//1xW/dHipbSp5FcG2E8y8UYwu0eJ

ImYHMO7ZPYwulTarhlUPqtlb1GXz59pRTJgw6q8eR6YMrQPRMMj1Al0zXnQNL8FJu55xmZZekUB14EU9

v4Ve3Ws8NXiOsxH2nhrosMdavUEm9JyPwbnIE8ZB4N
ApU8Wn45EURbqKEPHoeBgaKZPacils9swQIZxHXpGqyZ0/swBCQBeTFg3GMoe5GcLm6EyocK5hXqsx3c

rEjzaTYFSnMBh2e/5o5jzKyk9bLaX1M4lqdIH1Ec7aAIcrxqPmyHl3myPLUAX79RO58p57WvNYSpXoPi

qJaAuiBvPl1ud7TE6bqDc34hWMjsoCqOpFcwIyolHv
QnEpontVpxjRZnZhVunWpmV58P6ovXEBH/cpMzYmp/WTmLfQaVMRbWxm6ZmB/X1IRCQ1PhPv5A+khrbv

4uqP0a2PrUagYpaKjk0eZrvbjwtjPnOamm3zrjfUtuzAbD+SRVBlFM0+2W9zxF1tk01w6ljy66bx3eac

GUb/C3b1xHcdlj6oGaL8MVQ+MbPAsxqoxsw4WM6JSF
E51zr+H/Ku8/pfzY7n1gqK0M8PywweSoeYOROsgSSisSFJVUjpcrOwv4EqCGw+IXKR02aGJ78hq75sDN

EP4pmFz36PojoQbP6SJGOfinFqPZ+jaT25oi2sTdopgUjCwzOxSs5LZQP/Yy8xbSMOKlf0iity5vTnYt

zOWJqU86i+s3y6iDcfM+Z112S26+8v6QWkRBNuhvzq
PpUfpMQl7Todr0qKBMgu3UoeUDjLTJ6PhFb31WQonNxTm7One86RsSGt37GlaZsPSjUo2JDlbFvpQyLG

YlQukedIZ0oH5kR7s2G1NCoWW4GrMDkTtV1FTu+JfjackRvcKi/Q768pCNNa2HrmnBnzbG8Cx8TwMDRu

ohPZrSuGuvg0Zjir/DsahPlVJCdN8/AecmQdUIL8JI
r2A1tShrzgvo3GO5D57j8z3JocN7z2+swA2YoLvkTOJ+mBYrgqAT2w9evZUC6Zlhd4rvEQk5GjSWoYLy

WIV7UkY5oBsYezaCpiUscWweww+7wYwxEbVvlbxnd8iQcZisZUME9qtwP4imVuX5uF/m+mJCzJe8SDhH

BQX5lVnqzj3cvSpimQfrlJMAO8tsU+fvyBxivDtvZw
np2NULNu/hAmtucGAX3MRgxL4qzf8oHU04po3Xzs7GXrBwd0J3+nuR3ziv59MGRfRrtZYIOfTRzmjReP

Mext2//+hIrRxGNDeu6gTNNqPBI/uxvPBxobM0FyDUyN/ZioMiuLhXN0NGM7VKHNGkS+cikAmxXq/zwb

IJtyQcO+e57WkAoh1RYkH0YLnV1ZLterTBF2BRf6VF
LvzCRlqxT9V6CBxhebxOF314pQt6a68UoSD3xNEH71/+s16qmyGkXl2nKw9yUpg9EnSKZS7jOtudYGGu

/Lwf5pZ9scL5Sl9CNSvtWQXRjCKtRBx4Z7MCOhgF4uiL9zvJHX0GFSERtTyAchEi8Eqea8mT+ocpl/Ng

IXHWF+g6esmJBVxCmdrH/wuuRK83mQ3mI1vYonQVhV
2JX9t51g+nZKQS3hzOvK/Xv+rEeSef6Vx92AfUGFC5LkEtTi6AxpGEM3D795iGElUSpMoIWaOQNmQF0e

HNrj5nbOEZTJnIdsf/fV54acAImfrYwJnb2HwSslg3El+bn7ARhvYtO4D8p+PkThb1NMy7DdUG7A+Kn1

f8rqYo/8L+Axz5SAaHjvm6s5oCXsoNATp+LPQqO4Oz
ahzN2m9Q8BPVE/aERulKQHr1Vf39bDA0M//s74Ct+ENfL8t0ShyqvTuw8DnplVZpbOEwUjiwZbS8cOLR

TTb48nIjeBufQxX1d4szVyIo30qCnTCT8AQjvd7htad8o5Od2SDE/BzxP38BfNYrAaG7uJ8QNMVVDp0E

y18HnpRar0vgOWITbbRfaC905t+8I6qQuzgrY6QVlX
AcHvg3YZi5HPERavluTSw/TTN6ex471LNzigHdpxhRgyjuQLb75sfdauVm1L84XOLLvczVsSEst6ywPw

WLfyPnR574bvRhRJ+nCvE1FrhneFttisDYIDudMcPbKVyiNUViUIEzJIaczSkB0SGiUp4tdwHMgQd3zJ

xpZp6+fbQP0Kw0k+Huk/ynMLbrYODUcR2DyR5wQh0V
aMWk0r6lsbKapeVNqqk98LymOgE2geyVga3wSfOLir9tXtkRvO1jyOGUMKn7YrUCUo6MfiTkNCdk7EAs

UcGZ1Ar14h1pYBPlp5r79MXAbMbUyFIwq4TCkCFf1kDAZUqcV4W22HFM+t5WbVzzWWbDZBLme+w22cV9

0ZnsYqnn8js0n0HKZZHPsABVNBg2x1/i0ZNdeZ35Xp
qp9gG3cMLdWF5LeVltU4CIoI+bJd4kqC73WOw/SOYyJEuZkGYsgD6J6lHTMawYYRxOVs9RbwKEan8aGo

1By57Ln/14SZdRvsTs8QjxANJHZMtpkLnFCeB/7t44dh+dk1sug/wezlZL2yI+sjMiYrJ7fJHZB+82Tq

Dgf7JxZO4lUhICJyJhFDkTkT59FOuXR0ZiVqpYs+m7
I2c39/xqeLaRRT4iMoMA6CoAKOuMmf6TWnxZhXSGV68tYTcCVfGJhdgWCk9PtCBJ7O/zwQHIvl5DP/AF

tLfxVbkAo2Y9kRlF+swHLfy4PmQ0d3iFPhHchWxUC+GonZDlNkR1suaxTY0SpM7+MgWWI5GqdxVihaQi

OVw/qtDzCqYwyE6w5MVa9N6Iyqldchj4NS7BRG48Lb
qlteCn5FE73OVwifrpcxTLrtEhVvWPVvpj5EDcHpHvJgWxo7KhEVb33HZrPvRIUohoGUzeHqpiUoEoQT

LNwGCxyUooWejQcnr1RXpwKz5JdDgt2+GcZ1vb5dZxWGyKamCQ4BpBEsMjWtzhPwX3xLlbi9PLtrguXD

eDIEfjndHk6MEK2Q8tA+XgPQyBnkuSrDKlpkE749pz
Ecx+pJBGVmpL/6szDjt07yOorEhLkccaqAny0elz/jp9zgrtylJ4AKMSMZAEhcvtmg0lBuM9s+nMvHjf

AdfDoI1SE/61gzP185mLHkEEZUlHzPul/SksSZb+NAr5+7mWX4YGqB8EVQao51gx9tGTO70DQXcf8JFr

6OXfIZfw6IR/95r9oE8m8hvCad6101b+1RFV2j8co5
mTXtTIntAjvWf8MjGiUNq3fdsI2xWTJdXISmXFTbmYb4f8Mc0o6IF28186/wX9ny9FbAf6yyTPlVR/ec

9QTvYRXy4aV3iycJjs7gLU0abA0fg11DjQRjm4Qryd1XNYdVjmYzjvhE+sN8LSHvU7LM8yqqNF4iYe+T

NOLVtp2AAGF8gEwcnNsXUyKl0Vo0fNxqVyj2so5BBF
UeaOtDr4JT5rv0PH3TpZfHUfrVU22AGU56+2FoOfXx0Z83qE91eaSHJhkxsqVg7tJ01p9toB1Rk4Mpki

MHX6MLvbFiOJ4uEHO7VWH2eQ9eSNeGsckI/CiiQ6k6Rt/zTC1Hjoxmh1jBL2ONvQj58YW1KTazkVfpvf

Ng1HdjhqKwzuO6y8mvfvuTkK29zfrKgHlCfYocQHpA
sOL49/ZrvDw/Gw4sVBJUOlVa+LjcwoCw6exrvBezEqVJZUXJ6UfdAW4YDbdkKrHPXDJjB+kALQbOYPUr

lO5YHYc3ppFQq29TzfvHng4pFJbPsB/5SyphzoaHyHXkPnwm/zHb5GO/Gd0NUxLGMLWGDzU6/fPQQKN4

XFqYS2dUZIG+vUmucyhJ9DXjJeNdAfAKY66RNgY+pa
Chp6mtNlagk9LtVGOYbfpfI3Go4hgKFJU5+iPRZMQgJlWGCfC2miXdUDeIP3kf0PNKjtvoQthiXDDBtr

dVCxChhCLpDMIJQ4UDCNShXrjuFP2r/zFjoFADiuthGjAlf3CxSbG3MXzKYnJ/lwE1ukUwnF5MxGFHH8

EdpVoeor3h7XwR6mIvBL7Z4FPNxvhJ/N5IMwI6K9gl
QZAv+yNUEFRpejcj4t+vDfnGj9HkseP/UmaSjeEAifwbpaiTdVSBifE7kLyh/oEL/0zozTeewkmFVrpw

wJuV0wF4BU8iuGFb5FmkfCo3f7W65api5BWCcbyGTI/C9JkXI9+95xpzshcXguvJSJlrfvNRicxlREj1

/6lzaxVdeT4YGKiBHqqmc5TVQcDomCAqxpcVs5vOQX
z9wxXM7E7llAgaS3ESISMLHQY2RCn/1Wa52txWG8MlzNXxcb8BxmXJA/Op3xLlMs332Q9WKmhlpD18MA

pXtWbhB+po+2jWAFBUn1xIFkZIIzc0R9HI6B8hKau58V1/wwcnsxvGaFRMJNkpWrCsHknLTkc6TaEoyU

K6ZUCqRc+Zasnzd3IXljDA+/5qijvKn90OA3ZzufI1
qa4xux6OX2OXQZ4TUkqAknDC3LTwnQBcGX9gFXvZwf/yk4z0C+7ERs+L4QQhHoezz1fi7TC5dhd+J6bw

tvZdmmDLCbbgydJvWEsro2KUtKC2BghsWlbmqWVnziUQmktj+YHIWrp8LyZ98m4WgjRmnZauT4JKmGik

OFKNnwFV6noCJ1LTNQSuBo/Yw8CD8ZQNJDqJzWgZ6f
NgjkCx96eJdXhMoaE/4fpGSCE9gHEpnCupugjgEVv34oiPp5TSkiL9JuHlH3Vi0q9OV/EItZqnUiV0wE

UD4uPmORY3KymbYezj3gFeXiqw822SyV7Mo4pR+ajdmPSNhreRwtuRn+HQ3k7V8UQSqZvMejMsPS6XsS

37VFSjg6LhgSo/ZqfBvN5mk/u9IOd47qLb8EmjEaXl
YjsLZzHI64vcyNsEK6up/x6w9/YpSr8OEU0dUkFF7IVWjAh1tmfWMUE1CtmKqSu8fAeQFjPinX15VRxM

J/D2jbOSXJj1lDQfxryYhs36X9pW/Xao0DV5o34K23puMf1sCkajI42J8GJWk9NxIgEyJYrmm20lM5df

VK5g868Hzu2JMFHi3W10uIHTTtSoRI84FybSCv3YDt
0fxjIpwfU/KMtZuw63U3Vwmf97N9g/N+c/UbwU1Ubo9HkuY244Drg+w0RwRg+eXdsdHQ00cBMCJv/NjY

i4c7x3OECJyTwl3WRvGfUwfr5OY+L+xdrK0uQ6ZYbJWOXbrQhsVzfDGiMbE4fk0KDTdujDBGU/ROtDEb

XOR/oziLhlpl6UqfXjKcweOOFkGCbkg4Ir4OL9yxSa
7jbegNXVJ960z5SiP8XTEEps1UZXTHPl9njkZGgDgJ4w91ch0zY899pato26gU7ItxBNs5ffbnRNYWCe

H9BMvsy9XAbyQm8tFqjbrscMxTvJVBveWTRC0gye92eSIllJ4txhLyxtUYU9WjazN98cb/gYMxt9o0mh

DjEwwJQWb/KMLGx19teDF/C5bP7ZKOGIcWOpa0igdf
iYoP0O3QKiXzUKhhm9aSiX0BATgYsRX0EgaHK0MNqP5Mzes8vPHaWj59BDNHD+0b6zGUnbHt6Fuj2H3G

rRDYr8JrDeTI4UlRMG0GnwORECZdSP7WzP28rqaHow12VqEqlSjbVGX6+Mmku6dkzRIdsoXXmMlGMZlg

9rq/ZRUxeYnGVP9HNXfG2Kv4/xDndLzns+chOpIHbC
OuIBmgpqVcwmG5Q+JagSf4xusopZj43+VRdj/QnHTgikC67gWsRybrNeZ3350pOmDaSxkh2oOCcOraEC

CvVWTvi6rIaFQ8ibIugKVcd1Fs2WLXDMKD4YF7P1ShVJ2VIRWP2ngXBHz7bq4TxqPni2K62HWLoTsKtF

GILVW2ZNVq5ApSoCb2XnVL2/lZGyXMyqRvlwInA4nH
yBeLKh7MEegW74BSxt0vHM0flTS5LdAGBLS3AunF95pqQPS6wKYMi+eLasCJd/8y/gbraKwkfMAx4nHa

9AwBvDemg82pOOZfi+yBYj2n/Kp8aEEihm9GZmjeX4LebZ2/0dX6Ow6l0LOudFWI/RvlpVIbVC/V4Lkk

Za0bk5EelEQ03VVGq2ukh1mo0H+7mgOWuj13IvC2U1
Rq8VmRNUVACb+Ugkx+JRLLfXVGGQ1QMck5C/0Zyju0Dt80OvcpbtGZbsssJhhrzaBN/SxFw0QtZ4jTJt

YZAWLrObS11aibftSe1/wihwXxiZBL75qycJSfLaE2qOlVYNZD6sc6DvTJm/i50P/c+bt0iVC36v3ww3

c4WP731h9QXGH09tEd/XFbaFtM27rFnni8hS4QLgJ3
+atVq7cuFElmeL7Rzr9e3L5iIcKysueX3Ge/oTXoES103CSLb5i1hcS1xMrOwUP9CZPZuIPLTfSRhnDM

cCGYGGpWi7AWGzBJlBOh69dXs6iSv5izFk4+2OU++HzPmP4PQOSEwZJRzJVEYqpSHTJQTjcBGnMH3DJw

K8R9XpdWn3c5BCYP0JCXWHKc9f1RA01+bZIOzvyk7P
YU94H7mtq2qpPBZhWw6FP3a3+MnflmiY133ZlZxL+0y9reXMKPDXEgVMwMBmPHJiGJEqybNO70rRRxgD

1xrjjG1pKX6J5ZtQrbQqv77LIWsQ1dOp9gZD/rcYxxNTBh84TjNAoHHdUDq8HJIbWnyHnAg3WYJYuuR1

T2jwBZEZ4xpqvQq5DKNhfOw4io8Fnu+fPQIkk8jzEa
QOzjFlt3lnpd239YJaaqDGZZJukzk7WiPvmY3vz8KJib2wwQT1oJxAI38KobTU1evo+kLdbB1ByRwYBt

vsgmAJ8dvCKvqk/U5ETaMg6zXRrTcmL6GLUmY4/Yo4FX7Z8fV7M8C7sw9aIdjHgAnaRXGnlWAWDcPaFK

UuhGdf1AadaZwfqFllxy4GHc8HNiEZIums0aDlRg6B
V3L/unyfaxp3qouMDqcVV7i7xqZokdiUygAYiBszalphR7aZ+dPhFijIf4SvjvES1xm7YfE3IlG7gb3i

GhNgS9tPjmgqdnhaqLwIWmTYYJgHtSDu4NKsEQIDlAM5l/dHaEneeqGlPsGl1WTb+UtYtmyoimdo7iDP

Kg/Pe0gQOuWleM9kPl7Kkw1B7NYoiMbNkAuU8SpEdu
NoyLv1S8y4MG0lNra0/PthmqKY+aem35q8fmp6sQNSOlJBKJ6uQLz3grZWCtkUFZqi+HU2gsF+9WG9Ct

72U02p2tVywJMSCnxKi/+cEiSSJgbVc5kPMNnX/biNpXQ1La7uNGauh6L12Ue5JY6Xisx1Bi3bkIYUY1

AREzVb/RF+gHfQICK6HUHcijKOjx9TDJo++Zg0Tk5X
Gruaoz4U66obAgMtRuYMLERwbwZArVArzvW7duwrMo+vYpB2GTPpIRsjz/0xbr7BwU5Es5saGlp1fFGb

WAoI/iHMPT4XY+HFos8J536B4IfQ907SZalxJnvvlzPIjreVLSWl6drmflU0puJFGpWoV2CxqowHtOXn

gsy0BKLVyWmP0dkMAL6piUjAxEo6m5YG//CYB22CFy
lxF5X3EvUr9YUX/yNRXN5OlzA5tNJCd+0f7XJEGyI3JZrYLUoFYMYGp3t6a5npitqeXtR1qTkhs9+c4/

OeTlbV8cr2lQyMRPEoU5STNNOgUZKPnOpQ9hn4d7Y80pHZDC1YBH5tEAMz3MFpKssuKGT1lRrAT9YXom

zDlI+S+tKJb7qd1CTatcIwmalgsgeO01DNsz9gPnGE
b6uEPRjBTG2Yd2Y1d639Z4W4ZP6nRi37eYQfRykM11UbidnfxMKTAOJa/NbTik1cl/ex2EzxiFCU2boA

US6l/wDAswzVvwgxJ5cM8RtVXWtfd6+R71B0EQaj4B9BEDt3gCVrakbDWAwIjCUtMICWnefA7Ih0cZpJ

WOcRNr5OP+Q1UMLXyUwHynwO0a/0w1TBOY5xHJ+vjh
+3B4fxIOEqzXa2qgGnRCxT+Qp29Dc8YURUb5iLSE+D27ZghpOqfyxvEagjXVys5KCdZqeXD1J7MuXBiW

0zrySAcjQyxnlxSPOVktIF2at8z9K1vHXmVssQBmvfdWQqAHe5typSQgxTYugPFnwaMgT3aiUr6KMjgI

LdeoGJhr8qRqVz4EOtA6vOJOJ0PnU1jP/Yvl2JPsLV
oQgDXdN0NgZDdsABysPG6K91TzjbRAxFCLG6tKAnLpkG1avgx7AcXIbPGpUdVXeWiwp+/Yu5ER7qXhw4

bUZU5lYJBPvSbSTKlIoXELSKokqMPOJDimyESRJ/OmM2ldnu/sSu0kdS2D9EsZjhIc7Hf+/JSGa9n4XL

L/NiMqz+6W4Q3teDQ4GuDNUnzCC0cRPyWp2MjXjHK4
d6R098VZ3LVpqOzpnBxAfGkr55lxSq9cJI/9U6l2JAIJRjUS1QfW+3ukgWNpIUmDasnBuv8QKoOIpFRw

aOm2qPdICwtysvk5EaCLsn0nF0jpcjawGyzS+Uqo5SRlBEeGpABICKbRlC1EcDNPaNbImavMxCE8Pmiz

gCjYNF3BimdLUmroUuI5K4K++CPpzbY0p7yYhGneCF
e+WlBeQtgf/6fxkWlG+6lKoLNqeHW8aCYPn8+Zi17a/E5k5t5wdPQS1dvb5dbOSg1A6mHIofjo6jWlhh

xsX0e/BKVGoO1chrf81cH3vWK2ju4GhONLZlTJIhJ+goKazGoRz6KKMPQ+mj65dbBH1U1DDyjBd/c46k

s1g+9krRB3qezgTjf0mDQNqKPZ8emFWNJaYYacGaMm
FDszGbc+unB2mhiwOhWO7eW05Sva7hATvkpuCWNlSAFA2gM7IWjmjYhXYiVHJ15r5ib3ump3bw0jCHea

Rjht1aH8bTTXWIT5b4+R4XRro/HdLUiaTTPv6Z5WMH53gIFo8Jc8NFW5Ed1Evl0K5G1qSCBvoFwGIuvU

iQowx+6mXRxiGvP8IODKV9D3SfVQGOUY3TO6TETHXg
qdh8/8puMcK+MhUSlolY+cCutXrEyUuC1JmZ5VDPE86oyNmVc3W84MR95YpzEGiZC+TKGV14NQyErxGG

6Z6X9ayTfzvXnPGepIIQoLmlkhFrShnlVlW4or7q51v0XtGt/vJtGISVElPqqIuuTMH4SAvLBxUKEXMv

3gpnEEtTyT3yByZjn7fd9oPKmKixH+nI9U3amL8jr0
YHJUWNHCrEEoCquKfBoq9omwwCubu4zp8MOHln9RGz4BQerHUyGAiWqhvXoizAf8z1YmkJQu+8d18Tow

gr8N5Qe7rDlz1MGg9Q65sjdrne1xhXtsGRbfFAdn9p4bWjnk5mU6NUSEDx7XrRiAJTgsNzGFhDssR0vN

YEmj1xl4Xe1c1WQDfCWSBdh/oHVpI5tbDo/z6baKQl
zZ5cW/ZX6vLqMOUJtLxZQwcSqxVU37oBHa2DkNEHWu/y/wEnaftPzVZAv/uPdySesMT95ToPK8r0oH+A

XwJOpVuKlBAsjsZN3Az0PZjsJzYkiCRKU0skmQgnvan2B4/AvRk2P3Ma78M8RzKyo0OOM1k/JqIPXoeW

nPP9R88/FcAXG98bugxFDBQGAO83nB74Dv4idL+Pe+
u+8hlyyciUbYStM2kDzLf08jPUYMHsW2JUNCZzaSUx56Zz3hbNSSoWmHyYRKpW/wcvfDRxkn++2tfhr8

prsHVRtCUsxgHqZMETcqJ9HJHDHfZmqY0/nhinuA/64eYlhzrv+MhiY+aG+EqMA4SEWxTsq7H0uj5Boq

0laSqOD3LTxKTD/wOXMhpy1i6DxYNhDbLH5eRbIwNr
oatZQbWu6wtU5Xo96OZWzDyuiI7QSPhuNWFvKx/uALyQ4MnOqH+Pjxu0AqzQfHPsAl2g/HmD/AsxKeYV

2OP7QdNW6oleRdYKFGr5aYJRiZfs6oB5tWX0HZfR3vS6JdEM07jSTYF8/zHXmwUzd8/MNAgo15/NbDaq

sW42Ag4N8Q0t+8HSybkUojUuCP4wyw4B4W0ciyYHTH
MuIS+ayzhsuVit4HSC5yrZDJtojq4enBedFCUI+VqCFn4ysNAMtxhvYvSn48j1ruV4a9dexO1PDnmP2u

oTGXu5IxLtxxyP5OrV9j2JfXAYLAn2Sp/iEYqnDP5AWxYZcW1BZTRiD60ZcyQ1peAHlPA1H/Ez1w+h8Z

necuDITxfNE+v31VLK/ceXcOtIoPzWEnkGOS7PuItB
2HRZ51pRlj0TR4e1GHYK51rMZeWL1Hw4s19NlRLWMDFonTkrr/VxK0x60aEikY347wAM3TgegZjOqHHz

jddee+nAkYYu+f7tgYINAyy9+8T69MAfGc5+WP7Z6han7ic/DUo81GNUhvxRZlndz5DaUrpn6YGK5jNX

+nE01Qz+ZM9W5UMVBPjjc27mG1Ayczvg/IAtZGtD6m
rOi05LVHSw3vcayEshJVpLS170WvJCfdpCmFFc0+DA6anKikJroxZV+vvl0B1Q4wrqOzleOFMeLACr9d

4K8QuNQgNBUxZxbzya4vBisCi969my59D1+C1GAPsptkuYHlxWa5EssHwXMAMj6SI5lF6WtoIhQ/PPFC

VS086UNf7WvIYGmKlfyTJNpIJfWVIZqzFi4C8hwj+U
Grj6WehAsPejLUQFeaS6JbLRozuSzMCYRmwiVEgCJOjC6BdN4IEJ0YlWTp2K0sk2tp52ivey12rw9p5p

GHgMQQrEQCPapGs3RgDymsIL0pNdnXgiMeqRtow9omTbyORLCtK/Kl8yXEIIEIRVkWcXJWRwy9zlaySp

nDFh/2PEw7lFHasiMJYwwzPZBurGV4T0GD3uDXM2Qy
jy5OoH8FCCP0lPVhHY2bJwbUHYUJ/Y6d2F03haTmU7ge18ed7x53Jf1HQpUi0FZogzVJE1l/XgE/qbV7

XUF7QxtfhCj+GjsRG3J6v38YHeqRvaqMpN1DAfGL+YCFrGhkTq5HgeQV7ncVN68AsjSQOh3iVArADDVo

qH6IYmtMJ1i41jZFdElurXDcvheqUn4TSRlIkBbtTD
60Tfwoyw4Y22eVT2qFgZ2dxgwswKVggf4ONkDlfMtWBusGSMNwFM9l7Ky1VSzV63udyFj2p5N2HB1hqs

PE2nHigAmBXDcEOefPmayK9qpSjlNmS5SnD5a4mv2G9rEPUFvusSiyLDESJoGjf1IkcYCpVojS9qs/Dc

GEAVqOwKoMBo7M7GLA1uKtwPtggqm6+W7kywTjod83
CIRO/ZrPbTlEBDPefGOdqFKKFeoXc/KF5hW5va1YDG3MPT9NSWonOch8aJqRLjVG+hhVQU88vqf4u/f13

hwxB1Du/wRgNLdllk5aGTEPmlhCrMP+/XUEZ0gu0KVyDt/Ez8qloxmwZVpXzUTcGcsrFifmvrIFUn4bL

erD/czYTq0pO/vIwRZNL2ZUZgCVznSnxlMgosKDlog
HE4W4Ipc2yNFxClRkJEJZYc6ABvdieij0e44368J3vzoAm6MSBpCO2tI3FAQgyDVE55SbPawiEKrmdOk

ePqPC2LLqNvBU9ZNcdB0oTG5lKtriLgu3BetPi2pQKhsuGGtnvtyoc3q2n/Of6/5s/cRRWa5W2YmhpSF

8UfdSlWFq/Gny6NAZlVg+JedsZlrH8K4BvzRnPzlhE
XxLfEwYewZ/avCC1+fGP1ohU1fCDXyVsDBdev1nI5rHwOAlAJhb+mwLg29zJ/ildYNQiIsCAy2xJqDq5

COkeMxqLLCulbsGBGjM8PSP6Z5+5PeDakjbcQPcuymoGscBZV51G2t8eLWrNOuqe3iV7rJOhjEcJCoeZ

81tSEJBZjjRV7rKUvps+UWiyZ8RbfqKhnCnms2jZvg
7mw0WN43B4sn9e2djR454hPKXfE1z+5cv3Jj+KZdmewYpJLDDIR0ZPR90gsisx1cWIsrad6IgN062txf

zp5bqQgwejXm0WnZGGn19kk2f+FqD8Eplf1+CipZ3OtXLosLCHA4LTpW9UxpsyPv3gPj870JnOvy+l50

i+XdAwNB74cBPh4P1oI2zLjCdQCLEpcXe3fmn0ZsWW
C0ppAVWBXCD3rk2ePH/y9d49/4N/6Nf+M/B/a04OcbsEUmnzjAO48jhJyPaTeluZ3e3n5Gbzi9V8cFqb

3hr3BCUKBSt/pOJ06QKhfaywJygFqZQim4d4mGWpnRecYMUi8vz9d/0GzJ/fph6VuXai0grZlEhHImpf

Ex1BWOgaFG9QU5BeHUcbeOB7ZKIhnf2NWb5nkojacL
0K8JaIM5EeaHlOgpYFHoBQl58WqsccY2jmLXdB2pn3dfDZXfBZqfNi8dKFQ/XNTR7MpKrFnqIeqqxXw+

7lWqMIMVa3eukLraH1fh96bnF/zaFC52hOnEyRIhy//3DUSVGCBMFQyGaZ5slsOGZ3XiG7OtN3JjvRpR

6/mHARE/QeerVfYG5BHScIdHU7Vj9YEPNo7rK432Oj
41uC3EELjmGsm3r22MI+NxaamPiMpk92w5Q9XxOdGq/ObOKFclwvkNL8G2V2Lk4D6lN6SRfqMRhN/Hwd

snKp8MmrDJ9I/BXG/Vznb5mHPgXVG6fKPk6a9vg+kdiitUPRqxM9kY2DjT2eBj0yrXz8YbFzhwHmE6vH

E9oL/mWmK57+LasiQRlCcAqQYlGb8JLqeeF0e4v33h
ulGlHBo/UiLiKLBpYmyYld02QFjD72DqLVQz1NnZKrSD4f6KLmf7FhG/fGznXlwat/byAgyYJr6GLWuR

NPMPSxnwWelFYMht75g4+CbVnNUgNQlLDotfpqMgmtEDutWwKL6yoAlzHhClZYP3EXhYuAKDUaoUhkjo

eXKEZHPTiqMxh4pcPaCvg1OxFEExgJxZS79QzPPuaJ
Z9mdUB975qKu1x7fnvt+GcdbuiD+DzUVqtz9P/8g6I+4c6Md6mgPz6H0WLsz3FXM8JDhjMw2R2EhdDAE

FCyxCHntWVkO/zn8vZ0cL6Sbsbg2Ze23NKlVmmFYO56Tq6ZLzmiJI7AUW0t9ffJiyQ27zI8DKdMcHHYl

1sftQdmvdiwUDaa1n+PlytB6a3UVQVrlqAJxfzQV+O
d/PQjH0G3amSXnjoeawuQCOcjeXIlrf8+lqevfulvWapZsigenkR8hfY2RVLvif7BEmB9VRUYx1yuKku

0Pw4gOBfSrakA6tIHKbbGLMJz8od3UBYP8z8YzRuMhOM1qDIkjVZykKeRnjDV8GY1q6z1RQsPTJA2C0p

MxDtEQCeFCfx83NUzY/t7vJGIqmIALfg9WLGgLvPd7
Z+QWRNpGce8E0TKVyxMspD7Z3ACJgjsNa2urCtwHTPYMgU59uYbEAnIV69Crm8Z2TLrf2QWNIpKIGQsw

OAy19u292kYZFnvehByMc5X1y0T00VxI9CCCv4dMoGIefqQ2bG5W384DPn8UD4nfKmiKK7cka/4e6Gof

IMrDw0pw+gqX/LwRtV+270dU4D0yB+6ggFgpwZdUx+
wUiGSC5tVioCKxd0RjdZKIRpbmZvyjmezPvtKSnkhLhhKJ9vayNnm1bIlukWr1tEPaA4KmPcVK/ZO5eQ

0SufUxk5L0UzdX7YM3uPssea/ov4db7iRk5vG+u1UiBE/Fo2DsiHPs3WKLofCvb2RI4LHowwaNJjYJxo

1jkr+4pePhoMiIxa5ncOYsV971P2FL4sao2yr9gjBU
WxUiyi12UDsTBz55b7F/sLEaGkzGW9nLhyjCuPndW0oQs8w2kjh7m2k4TQNlLLcfb9f75HaVTj5t20Ch

76ZPivFhV1NfgMF23uPIsWyLg+M5ysDamBKf1jnpw1ZYhkZx6KQbFfkPNzCSkpVpxGdCHBAWzogVDRv5

nQeVR+kaT5M7tQ1TJlD8eEejZoHVlwTNzXwmdXwfSA
2cm100ykZ0MzY9nqckob50GhSxQvmLzaOTyi9K26xVG7CVGqopf/Ejg4nSKv+B6AfQ+Z0vKBPz4bSh8z

GZq5CIanrKlh0q45bgaDGOlohTeeB6PN0WCIW+rR9Ilzg3H/GCWBm43UzD20CRW++wktRypAAW1M9kYb

OMKhPMndQc4gChCldS5ugvgBa4YwNwfQzzgdyVKy3C
mwS3ljnOkwG0zHR11F20kjZ7uwRhf94PHtrrGUXDVv/BHBSWstEIDXGstcCJwQ1lW9B73TtBw1XaqCsV

4G6zhamS+ZDu83XoL5L/Hx43ypsUmS29wO9BJ4Kht1Lb2pN8iOfEpPdxVsX2c2AF/Pi2/nxVmoN5KvPB

1PvZFtDfKIEmWq8DV8W4GwRiSjlMEqTA2bn0irVppY
XK+OG6P3zgOK+wEiXeiG/hdJTC1gfb/j5l2BQuNdSf5sViXev2wU3ZRJDNBRGqa8V7IDtHxhIKe3RrYw

ABHISHEK+7mI37OfeL/A2hbonYbODUITyNjaLIVMG0bieWjX3Cv33oWd+0Ku4UEZQmlYYFdsqNgBRWGGu2D8pL

XunW6/3hUWFoC7dc44uIwgRywvuLUVS3TXIIlgyiuX
1jfRjjmqZphiGLNHLOuQPlP7afZ/AVBE12TkA4Gyn1OroYGZs27LfvxtDwyRPvvINDbWGwFPpvRZhB5R

eHKivPdfOt/hpV+V15Sm5Ehz8DuIZta/hEbeQcrLx77ZUPnE3/97X9Govhg/TQ/lYQReqJ4+nUuBogWD

JzIwUNcxGSx2uVpDczDofXeqYZKSiZ6Q1tBeqXvxYH
zfxfAgJXJihKowp1sEeb6V66/MSrRyghA6PQl6kT0tYe8bnW+xWzE9y36vKAsrdYnUGJfaX5hyLTMXGS

q8uguW9YaH6qsA4LlljEXVYJB2ut30oiUih1Aya2O36fCqWXjaTdLWH84x8p11Az8SMZu7d7ihUgUanp

6/8JosMrLvkRIWrf5/cZjkDti7WpxsVdrC1ltjxhzY
ap4bq1syB+d5LWwTwGZI2ORyAvpd3kVZoLaMvtKIaRn8uap62Y9uiLUMlnQl93R8vTZD0b3cMeHR2Cnf

CdaNFSAbhs8lebKHPHSPqsjt1KBe9F/ULXtSlaSrHvGEZ3dooRcFgSKX0x0pDmEHc8UKQRZNRnoASkpJ

aHiLRxMwlJk6bW31JIZyaKJdcryh7BaNKtz6McYURB
ujFg/ErgXTF8r8FBDhM63fU1XxaMMDKRKganmklGuCKTItY1H/0uhCD3zVADboI0TD+Bet+dHn6xNT9Y

taGwH3s01DDtvVAi+FoxrznyW4Rzquiiz43y64ULBx8BG2TTT7ZkDIqN9ARwtpub6t4d1XfsX2xpdWOp

IvLgLO2l5mQCK/Hc0gYogXEL3xaHqAsLovs2QaH+8T
PVuFrmp6VJIz4Ix1hJvo0/fpiptzcsp5FSmqaNp6M2oMBkRmXBAtSwwXadKhKECDfKJrzpaeKIXqQzOs

i8aFErmV1G6l3rKVbWZwwPyqsHxVtEl8FFTgVjOFKnbDH4yL1VgFpd25OCowfCq6T817MRRm73u8EKHS

fqXSbahGa701FE5YgFils3UEgCUJ6P7aeeykFVzJUR
y4dmJFm8ynIfPtflyahtbeDU7WiiX8yFYgL/qEY4u13uchwfATr5bbxqYfVsfKKpw+pVDnHMnIsF9Un9

7tC0mvYLwUajSmS1o4EH8NyibX+Lvfpu585xsElf+94oEOzoS4wB0J5L897xeidsLIdHPXRRnfrrbhm6

1m/E+SPRwNq64Rzix9YhpVSZoCy+6cUqwbz7C7/l36
fA5T32XO87ya+5VAGGBj9PfLh/UYU8XRdPfC31+kSSeDtPDfT6AWOPDS81SBp56o+/pue0nrm8itcH7R

Sx+TMJWSLOGiesb92mCTQJ+vh0cJcb5ePBc/jwqtuO+JHhPcAR/uWfiqFCMHGa9ILZ6ErYnEC0u7bQwe

jcl90jStuDRaugUUfg/py+VToFOX61Q4dtVykl2LBS
6v3dTc0LHFBmrezXlzoIscW3esy2G3Ch7/cQpruRPB6e8vrPY4iYxUvENqaO061Ml0Xb+sO1u2TI5i/N

Mv7c2AOlef0U1HXuQitdultg5vn6dLYOf/UOJcdyjJq/YL1arCZLOy1cQT2NqL/Nh2rnvpQClfYp6UwD

kMlGcxAAytdRP0qhhjPAZwSRq85uBckIOXHwxSm1fc
62o6ytywALGNW7tCtHDrqJfFkeySZM//qxxTpgodzVQ9Bq/8u5NUmZTOuSdCtPBHIwLaffrs8GJyKDPQ

eoqA312NHuT3UsMq3viMKlVDyhLWo+lfC8UYvrl2IUkeWWIgF111LovbeMS/0azREbQqFRuZ/PPsMy96

YpoORLpmyt3xxSes/Erm1NFNSqyyAESh5WF0qVURL9
SeR8AEVpkltBHTLwXUP2GUe11E7QtbaaFDC0yK/nFB8ndeOP2Ff+PyqhHMEwPvwkAbfUPJtr0OtcbM//

EmqfhVMKe/5V32gLn0xVMstWdnJzXCaeQIqZrIKjX3G9O+eZdCa4LrxucqGF7QnNDESbCnOW8C5bcCkX

97LyYiCTwRXFhW+cSQ7UizMapsS8r04K6xBtlwqpzY
UFc26RE3bacRoE3KXpdePIvepSlppYhha7jV54KZ8nNJ212zLv/FZQel5AdikzK3h8UCn7k81AMn2sf0

4noV9yHhpkV3VNVhkLoF4wZk5I86vd9riwpy1kyKqM82+TkPRU1GQ/iJPrhjm0roovE8MTeR8eCm6vxa

b2jzvt6azUFJohxauGqnlFdtwGyX6rGi5M8nbFHq6W
yr79kFMjd6CFl6YTe8zkpFyQmvv/Wg6VD8DwuAItRFOEkLNGmHPMjyasaq/EZ8IOTSTb6iCCXks0yuL2

3D9+03NXZ0KIZEIRP5fmJivEMD+37TWOcJaMRpojBpkh2gaQHMVCdaUsnJGsY9WvBL0Kgh5Ff0WGWmK5

kdnaFy/bWMO+7KBlU1Wfl4MEF8jlWGGNb1JEx4wMX8
/AP4JHOwvtmoy6UUy5csHefDplm1XPLAseR8i12I03vkvVPhkQgNHPnmKID/gdrHY5HnXvXlF/34Y+oY

Ap55lBogY8aGeHX3nG/pAtlKBRrdJxBKrXmZEXHe9NWEqDgB5mt1sS5YXfqiYFVFeQWsOsAbRFknBegG

2N2IcPnV42/L956BODvVahVGSGYjmEAnenhEgWQxod
IMdMpzHQaIDddxYHrv0nF9btTjP/AHP+GiK7imExzVbO5muuj4zwe04m/6i3R4Mu2Qa0RaoxvE/yhFG4

jDEfhMOjnd7NEBkqTmGvVWyYya8/H6O+wZU/1JcCPpcNefQNfsaxaEBKUcAdvvrDFuOPRBx9HPGI9LsX

IIdiR6EEEmFRdG6u4RsCcvOvlSErohJzssRya0gtus
CroGWCUTyedLvmDz674zY9p8M/M2mnVvmMOH5UszGIu2BwlwqGk3YQh9fon3ocTR40XWGSTPxkYmTeZW

I0QMMS4dmQA0hqDtU/rj7nnmOwMe0JInmGRFa9vnHuFfd3hiuDfa9Gc9ZH0r4cI2cZleGHS91L4LcVXX

/HyQEmBImcijttZtxtuM4I8xi7uhu7C3EccJpgg16c
uVXxK90sq7YIx311QXdzh1vcx/cMx4dARpUF0tckwl8REAG4UJbNheSXewdbXiThA0h/DVSLEvdeUNEZ

ZibWRf2dK5Z6VjBGGYcaMdn11b2Qc1oFkiDMQmOB2lQI7ZuXtibhxcrWtjNa4frcVeLTbOU57YWN3E49

b/pqzhxt9adpCJwJU0Snxv0laaa4vORpLempQdevol
K5C0bCYLoZGmp5EpVUq0K0jdViFlB//FxkBQP6fyS5/RrfxpwedHO1XJWUr7d2KdCIn4zqfYxEoMcVvT

+sl5iMu4Fu6uixChzCdPw5y2XARO7ogO5qoYUNWondyW20uNl6aXVlWsUhlVo/8T6zbWWsB9kXj77szD

puoBS3mL918j49we7IeHx5br4iW7m6JuQEombNicOA
ztqg/8UXcfCO8jhM4Y6NY9//oq6wIdgAjIekoFrffpZWqD3CwvMOFbOQKIU4aDvv9Zv6VYQmzhOwfLJF

m4HdGGNWE8EOXD9y9xMD4gM+nNgz5K8bdlg1oMDUOXWW73LVgQCePqReIsXvrW/zy/w7Mg5ivlC7IcEC

7NxD0P77KYSLwY1QfLYKqxW6TUpr/f1L/Ttai0KTmS
DUWN3ewgqAKph0e2ISlempoa926OJO0uT1wcLcWAK36yuhlMR9JrnC4GPv6xdsyqKr2uqJ6DTdX35H12

GiHABPWDWJzLnZZBedGrMYZ+U2jMo1Kn/sOFE0SPXqYjzWFTmEHfpqaQ1lHakLMN87xPe0tPwLDUVIjy

hyKP1QAF/9cohrL72mszS4x19pqbquoqMUE6Y9qC/H
/n3Twz9bjcYw5OFOZAKkjOh3/pvogvErfj6rIJ5T4WXX7wkg0v7FhHJG4q/7Eh1tpGRLC74rGUIB9I26

QuUE1UlNKneM0nd/fu5g4rYGLKiKz4PT92FroDCCZsj7FViseUTZuQVcnRrDRJyr2b8B4aICqCv9VvGt

1AO8ZIK38NDf0SG/PvBr55/fj39B/pleSxKM3KH9PN
PTnx2EYbvr+AD0CjjiLVEDL/CamL3ElVC+7grJ5Ms8aWImrF61295oq93Rcz6JeraLWJ71E6QYYAIuvR

AMlyR8V01WGfLcaGU+odGbflQKcbHmPACTcxnxUDttYEplE5IIQ0KmPwG2S+8uUL3mpxfd7zA+nWABPZ

XatzYfI0aiDAzGY7dTkmFmkSbLquxC3XKQcMKNlpiA
6xI1NB9I++HoyKsMDxvzkFJ9yVjmcmjpsC13kabhbLva2SvNoZebaEJwpdDgCn3k+/GJ1vcA6ceQ5dIR

UPPquj4SRdPFAHzGM6GZx22PvuSE+pjtjsMAF1Okq+3UB6uOh3veZic6wxvNgUSGmuqySoKoiFxg3IXs

7qwLHl5fT4o10xy4Cn9FNtEVb5KYV6NSRM/Q6SNc96
V840cqzfYsnEPc9jPoEwy0TYgbEwTIAoKORmmrK8OHfK43+p4TgJaQD5ovnDkhfhB1DyxF2dSpIODCJB

oVC+vD5U5Okxydchdk84TUyGQZTEFJKXJRyleaJXAkNqLfJxUwU7UCFR000a/DutneOwJSX2QDBNJ43D

+xv53fvZy00KNw8mCDE543M7HH3htcn8B3vLRvMq2z
+AF70skKrKeUKWNR4HibxBiyGPE9rISUSby7OzLF2YvJzFAuoMcRJEAKh0/O5dwWTZNRa/KUWpWsBZcT

WTsVZsoTn+ukPRK8cApNbH4Kg45gW/tab5cYwIl52qCNq1sReMgRtkrf0azacfFrDWihNbtQaXvhh5jn

RKnM/76k86NELaNZHSb7CdTPFg/mv802S6szhf5/Nf
x3/QhePoT7mYGQxN2Tgr2C0hRLa/5Gr7bAhQUutyQLPnHQ5YFrsRS4E5CGFXsaAeb01FDUQOPyfhrVAe

8iL0bINAEql3JSIpDkXpzdKhjj9+vcgRKBUvZDm7JX/HQtgY6PwVjIW37ARGexNO5WldhHI0qKzuuF8z

uEzlEnW8uElLOICTihLpjMDM0sUJUAy9fYp9/YQoRn
mMFmn0TA/SO8WNSWcS5hkREzy69H2bP7SrMVq3NzLBW4WUwXlBJ/8LJ954C2CwQHTjJoXSTEVRC5AxwS

p5MVpa1VOW56yEny2zHl7Fh7t91/UrebEL3xR/34l3PkdCkNyu8hx2k1DpIjYlqUVIlin6xYMA3/mDIZ

n8uYBFo0UDm+uWtv2uA1+UUgX39yH8gClYCohd5GhU
D+zhUnXwTXeKB2Jx2dTDYnHGSMr9qKBJ0pTgLEr7UanApsdyINHCoI9s4d6G8JnqCfZ7oS/cJoMyA9oM

x2D69JWr5X+lHGWwvlVRkY20EMcs41yT/jrKha8VA0vNymf4H7JkpRkr1icwf8O0ten8r9HTBmAkJYQV

9kOBOyBdd03i3s+7Chh8yKtqDU1ge0++Ae8H/CFtv5
UuBowBC3QF5tukWYoLP06gbMPrMbrli/mo2+zd7AduQp/jLMFnYNt49cAHUUmOX17MgXRbZCRTnVMRD4

P2TpRs07puC14plUBFFzBCX6DdPFrGHentATOs0wmAPU+8mIQhXJsO9ZkmFBbP9SpVEpIlRhaTJ9q+Hx

GMJoTtwSTlqyD4QnvpI4086i8q9pnOWVNBq+X+S/yH
+R/yL/Hm5jdHWjsYjkQfDM8kYGIeOWCiJnxSVlb+eKsU0yPfH2r9WO/yOvvu7vTRXLxnY/9UeXarTMnC

NrW9WO8R2NGS81ZDstrXgqqf3VB8qxsBscaFSg6dGhq4gQTFgprTvPeRJrDnSn2hRRxuuhnKKzV6D4N3

49zHOUVPbPz4UTpZl49hZJuuM2wNz3oF+cRn9GQ8wF
nfPxLC79dVZbJW3ChtoedOGLS+NfE59T5FVbMPGAnPmcIKEXNHIPheGku+aTf6Mid23c+90Q+bHVZ6cQ

/dW21OWQdP/aQtrCxHsJs+oFBlBOp08xK1KybuRhaZ3TLFBuUddD76v4DE2YhE/pldu3Gfa+hxGSn9Zx

4OD3Hla3ERhJfcNSUn6O9NEfrWaIzLKfQVT21vgv3F
SEVHWKQo8GlhxZxcc3t5FsE2ItINLRS0MvO+eW0RsjbtkO4sdkJTJMM1N19TNS5QOk1FaONzx1dGivLd

qTG1HjD3EaZuoWYt1TfsSNDNWOBOJ/RGcs/Tx6iXT1qjKchXev28x9ev97Nwa7zMHGBKF9ohOYx3YgiS

zC/oZtqVzsBPBLXDR1tE8PA4cbjTNjOI8xl1b9Cv7L
5oaS/GiUOh58cE9CoNobvsjrvt+W52IwtXEApiyv3q5a4WZtKZeCf5Iqwggc0Z3VxlQ8njTRDBkWdTdT

dbeiTiCtRhHIYtASne6ES6QIkd8NQMQrm0qp1xrBSYaS6nMdfAC/tfxzXu6TbBq+Br4UXTmtC7BpfDU9

qbf/WvXdjJ/yIpuWnatt7erV85EoonSh/h1jQGN7mQ
9+N9maskt+QxqHZOEVUJQFW+zSbZ2rGtgFJ0nTUVmk7vO/cDukK1w5itsTr3wjzrVqdyasouYNpVlgAM

/eC30h8TaEBgdA/032gqmT2/2RIUsxSDOG9FfgBIUm6S4qGOvinxRIKr3STwwfBGL656swvDsKbQgEKi

2DOctK4nna+Z87M/koT0Vkitj9I849Egxwhbxe7da2
ee3nnjp9lb7uZGb35cT+srNls+gaYuu252bafZbjJ8ejO3l0PdmEkYuKCq/yomwKigafDUtVQgibZfd7

74EPNokvrJKK3ZFw7raiw/LGzFi1FyMXG+OmgVoSkd0GXxiMSPpFVxiJ5RGzE+84KetyOVQlfKCNLXRx

j4mRDBrRmX3hqwQZhw/mu+MAHjWWSsYD0Gcc9MOwzM
FpF90dGpesCaG4O2A+vUZpo0NdrmycAHhwfhdmc4Zfh0BDvq0LdzzuHydbdt90tkB0/gLKVclr0EyJRJ

kGZojsFiHrO9dLaeaf8+a3q7eH3Q5s8ohQBlhLiozLPfH1NFsw65r1PrNCyRfG+uEKRIxxqxDHZJY6sH

DvpW4pnhWsixw2LMHvJ1W67XgWqigD9EtkwrQodzFk
/UGNfIGtVx3cW0phOV0eCGqaCCYcHyekfJ/Li/wunPbouTwiuzGXB3aEmIi+Cnamje57VyhU7BaQbOEI

UCd+++s0mZu3t22HreYeiIf/qs/Wg5NcqzTfp1Ha/+DQrttKaeYe/x1deygM6vTNHnPSbIjeje09z9Wj

OiSec9i1jYexCVFgiabhx+C2wxixZ2mjrfCsGetFdz
g31DBcrk5lbHbTm+iSeea1GIdpmL2dA5N7f6qoTI6l8aoM2rzr8bPUaKuhKmujXJfZuDVB5sfLpa8FGV

wjyxwuZb0eTYFwZvkEC0XHgtKR+NjmI+eTPOHGFi48+wIrAj4FtZixN+Mxynzt/ilJa/x4kaIz78pn/y

pMFjr5kamcw3Yzlr+BUmT3/Nact5bh7dOfq+UGqQ4n
yCAKJ+TGfwldrr5wr2YOGyoB+8qsI/JJDFZsTRq+F0qD5osSJRIYVVvMmrOVnBENwSE3999Z6FW6ucXs

emuNfK+OEAwRQO2A3YIZpHYoyVYZjaG5XufgnN/DnOZ8tXmd55Ml6yuRhSzIugtRQtIbAbMV0zuJANhK

3nZQ8d7lsdWX3lg5zhYLSeL4BJbiHlAiXGBlBbsyl/
lmxEb4b2ojyViZIKYCPKCKU9Qik6R0hAEmDJLqfZ54FjiRKxb1vGMPVltOhSysANDBKYSckEgBDnCojr

7+KUZdQacfkXhjdFNiv1bNd/78xX7HtZjWn2L13vACwHf8YQvl71+bwgd/Sc2l5C18qemxtODpwa0vYD

NgMcQE/B2JsxM3JrqddlZpNuX55o8N/a6wAcqrPr/y
uHO0PaqhYJuOLLX/nniR2lQee5RaBcOCT5M+pPfNFxGfws9rq6OZm4ecSuT17ctol4EK142e78AlUxKY

2KE/mt4Cwx4irxKAXSSBwXyIRSbo1sVQcQHOkPMQqFg3S5A+twZ9k+2yTLj2fsx5qgjQgOKXlsMiN7iD

g5gESWSqY6zsVDLYAuhzUEjGqpS1WVOOQvlL5bew5o
8RHgmw3Em8+7mf77GCVOfuXBvai5W1AHjNvVCEyS7NC4XDxIKwMCm/8jUoO5oenzPf3r4EY0j1r72115

N2lnfhAIr6gjKVy+rD5wbJJdQwGDy3Uvy7ch9mNrc1s3N6jyhdy+F5CoH4Bhb7Rjwl8PqkUHh3lP69iU

32lRf4JBkcjU2VHG4PsGyJaLM3l0JIAKLQ/FU9+5ay
etNlAK3ibdTdXtX4nsaCiGHK1l4v7M4BhejNZhjiSOswXUJKho0QpoUXf3wKbAUv42OJ1gXAJZ+0mXvJ

bvcrqSRQnzAVs8lLdTXlMxtWP8FkHwoVTEYypZHWi9HDirszN6+j2QKndtpzu9pus8J+XN/hRgJtPVRU

7AGnrqGxFexlJszv2cBEzuiy8Qbo937zSyeug8ktw7
AYUwdzEYP1u3MFUpc/hgEmElARJgdrL4L2ZEnf5UXJf3mqyLpP+OoBca2eTk95rNQDKYso166V2CExI3

b0oSoJLWNj3tnzQSQ7OixlBnDHWSogBRyoIO9jtWXeTBTTI7pyHe3kjqX5MSCDrkpG4bnLdX6xoVICuq

1yKcyBNhHw8ZbEqSvaaSK+er/t6/cf+xYjK5ZwbrRt
k6TkjxwYgGbbxrN23EABrDyFgD4QZScy3UHQ3i7U5plxGWPU4vLmYb1RIizox6JBLQ4kV+2g+KzEnyy9

S8o10XG7IGezCe1fng1QjM6EUhoeeh3MwvopgsUyTGgbzL0QoCgBJogm9ScCYXbwsOKcFqVg7Qt7i2vJ

TP25YacMIi6H4Tq8+LMrLE05xQtmTGqpd9GE3uP2pM
uAodVPXpe+3mSE1mMH1tr04VLG73QFs6XVmjfyY1Fgfv3qTswQ1XpwzJo9v5Wl2cEkkg9NijVx/d3EkJ

k+PwQJl3kEraWwxgxHWkKFMI36ssZuqUnOvo2RFcsGUFmxHN123QiUl1m2FEN2nowwH9picAKnQtiZ4y

0+OavYlkannSCnBIK6sKTMjorWXKiLH3Vw0KoYXgyZ
pdhuYcZBjq25JMTQdle5fpBRhh2NXJBHNFERDcSgQpacP6vouC6Sif9IpmWfCD+4mfNXn5fGM+BFC248

IIUuU1adjNiUTQMECHWFtlIdF7pIuYWNmTVaT022UiLLZygMDc4F6i/0tbPHa/PXawoJvCd0HQO8jEdj

8SSafdO+PUVaOZWHptBgeu4yDaDeGaKjQStfegG9Hz
Wm7MoSLP3nTdd/SgQm6LtUlGrHU5rJeo5/NDJQ+p5zvQBvROsCPmII3uOO4kD67tmp2oo/eSsqlbaWjy

zlhQ/vvms6g5MHTIYYojRK8TSZxOXQGGQbQnyYzZgw0PUruF/bGGANBZud0gHhxW8Y99ZtNwL1ulFSgf

mct+oZ5/rDpapXzDjMVwjUa4Y4fWYa0FChridMl5pW
jnk86ePf1h7PQNT1A8YqNgaZTwF9Jdxqcttm0PagqgWSoq8EnWAa0k5UpFEtSzn+pBpk6M0ENfhNddnk

YSqBYl4rKEtG91Y59FPzggBzxxwc1vcwmYpM91GR12/MSkb8uCtku4ELEyjuRoXMx0N4fzDSDOLI+3RN

WWjCd+9aLQGKMfdd1uuOMVLNpqcz16qCScKbW5V2Z/
tF3QP7hvtPl6OCv57rtn8gCuEo0qMuZ1FTnkC/WgLBGOXTstqRd2OsLbH5blcDdiM1j1/kofyuydyPBy

l4U2aiUh8yhDjGr2j84NLC5QwDvc1eBBRlpUOTY2+OAxIW8lW6WCATZNm2ykN+b+knvY7+tutEEjH1gA

BdGUxxKWuJ9JijXJLm8HZnUyEqS9fHFzNnI+XJt618
4rS73YE0LhVm7hldcSRkHuyipdRYq8gjWXiQbjnao6fbVxVPQt2ljgcGW+JnqtUtOvr6SOF9KFekzs6Z

+CDb2YvrYrIw2O950/vCDufNFIk/V2Jq0j9oLdaL/0cgerNDPP+6YrROZctJSf0NmeSAC6F7NeEYAGD9

bWhZPognFcfm6+y/zXtYC7t9M8yyym3Qd8TlRfaJel
O5MF11OKCXsSInp3FuAAs5p2PGDu83yKX/6xqL/cAJpOXP8QGliZQTI3y3AjVhOhSW2/NMNIt2FWJYKe

5yJBMF/No43urCptx7n4RKsjOf68wYNSZM7xzzcGiJUOjxdmN091qfmWEKkmchewxx38ipSC8HiII0jF

DkynoRpqw9CSupwBMC4Kzimjd9DoYo+cqvWXsOf83N
71z0xPVMcwkDJDRRK/N8fj3+RN/+zYKrHvIq8fUw+j8szie00Yza50tmzGoQC63e0C/br04cgcSYswM9

dKPxYjtAlXa+kjHQzllIcLLz9kRs22m51W9RFdRdyB/hfXMa+gPfsGAWW12oqbgl3cXiJGTBeko6smn6

Fowr/sTmhFfjFyRUfkUKO+T7qMIaD4wOYwAVPbx4SA
VmNJOJjQYTI9zPH+wiOXCh26L2jZ2eG9eDWU/7fF491jLTtiOXlyvwxD040bdzk81cNjSotRHqOevjGL

rkfCwAN+LByWu2ecz56Gt9yl9UB9pdtvwi4gzq6TPXOeIYuU3tkbacomdh2mbAMa0P+TJVTu4W2Fbzan

8gqPfq1mE8ERR1NQzPNoFPacgC446wzE7pelcMIzDf
VBrb1KcQ4mJJBEPGluJDbaptjgOegqYjy/r4byQbSDRnYDjU60ZdRIWoaYqQhsz0QW16fcyg0fFybTd3

cS850WTafkQk0tgENYusuyyKlYCToBpwG1LzYNjU+qzZkErU9iz1cpU/lfX10lNEcpQIm4Dn6GyY2fhK

SgkoLtK0qxn7a1x12VgelPZHhmIls5mNac/KP5bNS4
uigoxvKL0cXb8CrA3oyO5l8ku1377SnWeBU8lo84wCrqe1FPQgtIOei9eX9kbASzeyT9cGd+SmmCCEd3

odOX8QOdLWypQzcmwSGcaiv9KCWFiVzA0Re8kr9I4fpmMAuoEEyK/IDfN/ZtCBcUvDNaDL9Oev9gciYP

+1x3YW8JqE5K4lH1WFcC9v0sWj+rPI0mR3oAHeBK+z
BZ1zevSOiRcwa/aEcPFOlmSUZ67eQoCxBW//GP9khy/+x97tHb142SEwL2bGaHsGQ4CSkoltRYhnNTyk

yrR5w62IMqvY3sE7KHyBChscbSFER+XvAz3ZovoYjNxvEOjZu4gJuylvvcGi2HfAg1qFXuJluT45EADw

QzPT5M8Xzx1PTHsOjUxXIZCRE/Jb6G9UyF/Jezub+b
c4gyTwacA7iQc5TQbZTizfWo4qt4zSPJQmLoJ58i2u9BMWOIQz3NgF/Cjn0pStEtJvjj6Yt5gC/K3oe3

xJMOba0nxoLEROWoEITuJZfNlw2Oi9NgN06VXrsi6f0ond6u76CIRCu7zv9ShDIL5w0Si6VaP7d7gBNC

9kwvb24xPrb85h+PWMgRORxmt900GOztnLuKalv1Ie
K7QaSCOXjjOEQ+WqFKDaXKpRULKIZkSSlZ6GaJYnz/nlfQcEOmdyNjs4LCIcAgMzuHJ+PcjsOGuhHvOj

8twT01BVTev6b7cM5S7G5vo8ImHjkunE6OZrH0lQF93XC7AaxAha4R2TErAWwvO4qZp8R5YdzDK8HWLb

oFYmA9q0bP1UAeWUOB9sH5yFcFD99O8xL5QhJnOzkt
XJTIZO1izzp0XX1JG/GMFHPdmPbVFSyVIvEuhj31rMcfUZxkanUpnbQ2A4BsjcGG0fiGbeyDonG3ftnb

AHOVB4BU1oSnUrtWhdoA1ACgOaVHBmLfKRY1zS45X8N4nhC4yv/06W0oKkfl/6EHU8EQteV9YZ+PhX1v

KQsPllyx4X0xi/9YiEyGzt99d/aT3NPayqnNQgsP/p
o5xVduTGa/XtVPSw2bc6eltPjdsvRxSOLHO4/sblutaeM1wzR7KPJAJ1SKphOZJBmZOQVfOAjb47ZJce

sHIOHSOR435i8IMpklOib51kAu1bVs2hemLo5mGsfb5HdT5u1VOfnLK6mv8cn3jJQCJbFmF9PV5gQkEp

upSq32RJk+fw31CwgxJJel21jOPS/MnBemo7own2Q6
kPWs9Wkd9PmbpHlqm3SSip8mfl0vX7MS4nfVKUs5BP5CkHKBPHiM+rLAFxfv58lt0hGwMFxOSvk04ZAb

IqDAn/X5hq+X8ouE3Jye5rmCHxRIOjKKy/E3hn+/d8Vby0lbqCeV6+Xich7ovE4wnIGPv5qOfihYx+T0

OJ+Lk3ppfJe9qFql0rqBsHLOv1MmCQRsxndBX1ySCo
lakzZ8o65CThYOUsm+cR/8g/2g4LgFAHvTlcgwvQzmsd2G0apHNZylRdkO2uqhcM2L71kN9+Z6YHSZcs

OfGWxktg+qqmXVs2Esv6otdBGv7ySsob5CYI/aEiN3T2f/MrH7ZO/gzoud/OdNRQNsDEKpyhY7f+NSF+

/eMDE/iazOegXz7WP/TQmNm90oCsmU/Yntv5fbcXe2
y1spTn3CO81MTt0K/91l71CcTgBXCn9c3tsi1j4V2CDYmnpK5Z0LgkuxwzZZnOkJKD6b9x3XDQpTDIKX

OejSf0Pm/3vQ5mtCsm4WqFwVulXHzZAHWRaAiltJ0CpoTTExvxpj1y3q1PhiMcSbz5fnqz732YwDOojo

w0b7Q0xVcNYL6V5K6Ztvh7F5GSrulGRx7ofBKOqOGu
yp/JjbfFEjaYpye1lCs1IyP74TuHCMjE8SoLWJall8K8J2e7dTEmBDaBVzJ/VJI3YFjhExyu62fZDMka

M0xmJdnEmpP4Bq2ccnuIgSFYEjL14lt/1VWSelk0L5yqWIrR1ED94OmboEmDnaBtaw7ly3mrB+L3GH8c

4eUq/VSUfK+Brp01bcd49PYCT8KwdGdgkUHwHKgAiH
15tRsAOh8xlBZ4C8uGT4fIdwa1LbFg02d+JWrMaEr5zNZ5yWpEL5cmGG5Y+h+yAISnssPLE7avgJjtBI

Z8mRN107MV8vbg81TglZECRuuqviagn7IYF9Wns2ATIfkXkmS5FSxf5DFmu2fmRpok3zXKyUDG2QN18b

whrBJyHa9nXFyTR55K4ijvNXsDIqX/m87YvLzpWFDO
TJA7y+nUGRZZm09K7lG8oS8p5h/jTblltDLMRG4ekBh0wLnF1QehSJfrjyRgURg886I51unjuPBFK0+V

VrptSCh9dbBj2LyjPr94cgKmTyf6gGwCUvMdheLxCqH0z7Jo2l+lmw8QvAmwgNgPcyLlPOOHUnh0U7mc

f10Aa21H5Kfx7QaLoQ0NVL5Yn/aXQlNHmAxSb8/2ud
dcePDiIKLSoXd42ef6zS21w9/rgljtc0N9eXInVkVV2OaCtW/iNIHgYtiEcvS5aGnBYvN6e7sc3Z7Qv6

xZWd2R1vCHuQATENGpbMy+KCVQhFlFms/fdHX5peg7N77bjWwWZKE/qAscRv/UoLrjII1opEUnDBqAyp

lpmqLmVht450mnyU//yYFOaNBbpqwvx/NiJ8FQe878
qbTACe/1KpjvO2RaPqRd0/0yA8IZyy58fJZ+/YGlFpj8PBzfgfo/4Y4RSzeASCNut00TerG9o+j2n9s7

u/AlNY6Zge4ZOfO1f/RkqTip7NuCfN899/P5adcDOaKP/fransisco+izq/skawApVcPbnISQh/8gftBEj+av6

fKDDncps8WhoWc/TNqbOa9/7Tk9PJqK30YdyUIbdb9
Hs67k4utdaqK8zlye080YFjjwR5dUSvmW6eHR8MzNqi/2Qg5gLd3iUGI8vvoYqRpyusDl4QXAWx3BpwF

oW545AP/bSv7MwLHxIUHafoaqGVDNtMzClI5O1e40ElD7BAVaesrcEcbwg/tp5AubeMkYFZRdtkcUBwp

e///upws/CM391zGF9u7SYy7tl0WkNAbOdy9XlDvpf
vKwqZPk44IcxNLIzBc92gZXFGUuLEtowB6PJMn2EVUW5j17TAYlSZYt1RgUB2NPUZY9SpGx67yZubmbB

nxbl5pnYLx/WVXD8GvvudxChNpQZZDHwurZRNuH+PBJ/huUA6qATITufB5ZRm+GoPesP/XcVOOJuYmxM

OeXxvbzjkq/QgBk0s4fEqEO73doTldOsLuDCjeL55p
7LVf8xJOdHqpxBy9bOl/rY1z5ugoEjssMyp3IPpvEBddO3X7f0AaveIrM3oYisMOat0TnB3xnolkJPgO

WOTYJ8jJLv2Z5l39aFh3DuHgNOkRYUAx48PednGX/AcZyZQWuvUPlCtupEg5VopKg6Pi40FvmDLdc35l

zmTsHzkSVrBF98xJmCsBrm8ZH5O05Ym308w02YL3dh
6+FTWiiLfl+AlFuMoYJAN5kPu9KkRGifzzw4BLs7GiVCg7YoOO/qOL2TCOHqSPysvgD6SQJdfLT/afMn

XqSEq1F1GZ6g2juBxDa+i92xBQAbQfopB3PNu4vn+OS+ytpc2DMaaAR8Ty+RINA+Lm4YgdmlyfjQmKA7W

MBnZBMpR76lpqZnmjgrrQb0JPBjog95KGC90VCws0L
IsFwdWxxDE/MH7q22x6cTBLqNVPvDKTqAgSfDIJOj31k/qm5Fb15cyhSM69ZPXHEH+1Rco/faAKmMpuK

YkmyBBhn/5YxvpsQ7zo/ISUASo3szQ++bDxoxp4/1V0ReB+75JaYhou6QcETvmQrIdkbOEO7trLYB+yQ

YTojtrWtYl1gpkZtMgWf+jh6dC5DKfqBTV4ZvRI2Hn
uBvR433xAlzLCfQgdSDb5queZ98rg+5F0468KGJo4PAqA7HEgVEuCRKxCcEygUmpe/bMbO2PA5qf4YDE

NpvprtR5CQcWtEGjiZwSYDy4ci3zdvlK3Pj91tGNS8h0aPDAtjLhvhrZdcE56ddpMQHUEXw2GAUbdQin

QiB319F5xPGXHxDO3duagSJigfv6/jUtJ9aK6nGdJd
DwUIt0ZDcpkAYGkAUXGismltkrn8tXsYjL6RSiminsclDbWWj+R4Kywhi+kf+g4BfnAg0GSNXZWWpJFR

dYFFlFUV1z95fxxikSkyoLBpYMwfdpduGPSSaPe9xYSz8Qcyt9Tm79SXtp3Q9x4teuTPzKgXxBgkJFlh

3uAArYaROMCIXadGuGjH/s+3uzCcOrbXeAAzVubAis
+2IvqC/z398YQ9BlOm0IyJY2cA2dfwlDjqKdUjYZoL6EM51Pbrg14XNelYIlo8Nt9Hhi5ZKXUzwMmxZj

9iaqNtwJVZ+JXN6BcuH8DGUV4aF6TaKC/oPic2/BCBERD5f6D+RfApV3ZnTzueaLc+gf9rOh65PAA7qM

QZKsDeyMWeWJ2ZNQHjcromhHpJb0AXo0QRNybE3uYF
qSgGNv+TBa//41O60CfMyEVonJAgBpL16MxNIxRoOy47loqcUsGdMysSzobJSz3lIUcfw8lB1EXSvL13

UVMtEa3aAlAKOR6L66ms/sLYg5+m7IxuhdXsG441OAU9FVpOi55njwrgJSNSsTHFCNsv83uRO0chQmhc

6qm+XaG200MguuJUq6sRv6xc9wbbo60IEN1a0w6I4C
mH7D9R+Biq0aD9QrV3XZ2aKy8o7YLMEzdIodNDSUxUGMfjYLykTtpiF4IBOkH2IWw9JlxLR018gVk6Cd

cz66bDUIzh39F7rWhm65Kg2OknaI04YoMwH/u1qnAaSiqyR3PiS4FLJy3BQTUBzIRSckS4JBBuzi0bb+

FCBndUzARosUrd/BdY0kYIT6qTGCS1ObxZ1Fhtq8Vw
tuU1yXB2mjb4331huu+pN5TNoBN8Pfff8/D9BSxvNEai7jkhXMSTxF0514prMCvEciFHrlZA9gtLrU89

kJDblc4b92hoKhvCN6/X+hOAX2DsVoefYhxXmrruMoJAMWCp4eU3g8Tc/nINV5I6nDl8P4s8i/4zlv+L

UQecU9V/wz8hSV7jNAjOZb8q1fpI/IdQcwsQXe3SUW
ck8DWZWq40dO7v7iQF3BCED9y6Yq3Mcu9uLZbDeBNM4yA+nq/cPYeELmSRqi3f8shhplGdJgxt8fJ0rV

bWaVkm0oYYZHFg6krhLkk/KhjSwOHFkzYgPKR3q5eOq7B4ye+asxFSoj/JXWpQkHAl/J1xiNmE2zX/mY

eBGVvdBlAmAYHok0lfH0WsI6majs7XjCkQHvCTOsJA
jQ8cy/Ta083frCwAxF3hS8/OVXQ8DuUu8IgF026+z7H5+M1EFiy5S5bcgdDGCpwlWxDkBMf0x3I8IHec

ZS8/n/bw38Y7PAAP1ufBxfpvKjJ3/LGG/1DLklG2B9X+7+5+bZdD5a+Xz0QvRzneU8EemhkaW4voclvg

l/zzkLolP1+vHbO0C+Z+5fpMKn8A0k+ST/VodIb/Aa
1UE0hqvfw9oGlthbLDC/zrsO4GmrNoHK85/fS52X2us0lh9YXz8J3qSn3/nNPQRcPTZr/o8tYfuZPskU

KAvpjG58O/HsdWXcTLdNe/LAkP98xWcJpWxyOBSnD7dKvt/zUHbpOc3hmTgbn4/zNFSog3szu+8A/8NL

1dy5/m/kTXQIzkuDgpiH7os3zBj2SGpinjo/Pjk9JS
CpNei9qRdqMqPywCik0T4ZteZ7WglYXIa13v4pdYSbyerjt0AIFChDwlfSl3dl6ETo8J1nBrM7MaK/RM

TD3w0j5K7h5LFeCJ/qWgsI9scUBx36YJwwVh4FiR60CIDbI4a6hNmwQ8kTZoliIR2cw+RPFp4jAATn//

P9dN/bSDX4Pkh995oLIKVl+48Pf0iRh8vE7VKg/8C7
/7dYSUy0pv07YJF7vG4FyHe15m4+n+zl+42FTcx7md2TwDx6oz3d0lX9g9i7eRELZP0P8oZySb3f/xME

/Xc2T9+NzelRRZPxB8sUD9N/YPoH0z+Y/sH0D6Z/AS9V0E4P/2D6B9M/qK6D9J5MSM/yfbJc4VbbuhyX

F3FxUHYv/06huU8C5nki2cdNFgpZhJ2izYbBGdXhc1
mypB6U/tfpoej+zD1lpHPA8ZpS1H6tZz1AF+nO30DNX/pHSMbijInPeS8JiX9u8yGCcNz+YxVonIKAx5

n5f10ccsn3mwzVVSF/HJM8EViVTYqImhNjKlbNBR7CKLw/kpaM0Pr8OMBbGO43cRtaMyJc9QYRtE3M/k

M6JOtA6KWb2tWk2HlbxQKk/tPo9KhOiUoKbfyXLL5L
fYnzNsg4/NG4gK6AVPCqMjghndelx2xjAUqX4yC/oLIuF5Xr/gB1XEZqdUg7wzGn3fy4dx6y6aQ0zzNS

N9NtIMCmeB8SaBHlxuONNiFx2zrVtfARQcW4/i4YPUCRtTRzz3P3I6plT2vhDHA7ctYdsemcqRDS5wux

aBdJJ4dmGWP2hcG1r2uIP4JB2hsjnjUBPX+Y9x8bWx
WXXvz6PXhXILruv/MXTqHVd92hC6bsrYxooE7eviYmi9mhBMoTqqMPEt6RNaNny85Vt6sFkYe/ACVM8v

aHna43uQDCUK9rkYPJFOaYQKViwDmit02EzopVtoC05Grtl3FqXwZBc3eyIVUIzTyweSQewgso4DcuLM

8HLuSXabGoyNkMbWgTm2HJ/fBp+25TW2C2FPeXsNlm
NG7eh2WodXphXxThp1+FnRBvep7aFWUEy905tAg+wgruOzRE2ivmqMktc8SMC53qQ6pvnV3rx3wLP0xY

tK8eyjzDo3zmhV5sZtx1gBgXFaWiww2rHDtVsj4zIMo7mzV2552zMHeXsFyaPcI8hJ9TypH87amjr8GI

INZng9UajvoQJadtKdrVAdNLvT9oTLY22MLB0l5WPw
k5pGqJJjTpp1Yqd82sO5VUV2pMTmiJSUxWISstzxka6fGLXe66tFT6bp8oSkWFy2vznplbxEc2F31vCi

N2IDQwRtlsfA88JC10bNrAVUazIKxlYHxtJk7Z8517TsBiocki2+iREFsMfehdqJdqQBBAF29IxYrXKR

XB/vDXrhxTYY5cwNUzP5lJO/DLsiSmop02QUe0BeA5
2ECuG13ujrltDWap7DnJjK3vQAOzOM2+ddlR8pt//Tio9CiOqHDZ9RU7nbvpoHD6FnkbCS5Z/FXQJPRY

qG+lFqux2VK1g74p8ZEJLVyIvFWQj+aNBI1ui/4q2lpH3COQqFKQas8R3PUYRA55P0nTQ8rW3Iw4XyuQ

YtvFCvnshTqdMl76t5DGJBtJ6HvqS6tRj8L+YS0/XZ
+Uj3IGpU2WYzVPEIra2SCb+Uzzb8pv6mDCW2w9PYItoQJsF0pWiyuGShJ2kqqVzhfdXClJAuHUGdj6Qv

/qBYwhStb6jcpG0auS9S92wnZWNVbyRytdnth0xBAr0iOkSonvDfRsQccynOCFL+FofH+UDAba4ECmTZ

5i0JlPH7Sj2izowWWHvJoP+vrAw4V1MCHaLMp3A944
dobwH92GoNtPp4Dwef+cuvU19MW3+vlTI/8GNsLyhW/zGpAFVCT2OllkKIc8F73EtnjbxKNsh6NPFc5y

qt4cxD+vwPeAgFPZhfMmVA4TiRwQQPLrSQf4UraTMx4RKiM6UJWBnNbuWcb/Mz6JFsgJgXIcKre5/qWn

oe6bcYePZP+VexOwUlMa8QbtS3pL6yvyAXHTHhEZme
9DOLM5VbKO8GD2e6VZt+0eS4LhUYQ3CKMKrFeu8pcFcqsERy2oDEZwkXIiyUcXekalFfkI8pxQ5rFDdJ

uuV4DLRjWKpFfFzbLMuTIgXB9CBrWc7tsP5oiY6avq0brhi11VoFA3CEQ5bF6n+xWR8ym6rxIRBXN+lA

KHTN4y1ghzYlL8+Q4zcA/V9A1/YBcXmhMklMd1yDMF
lLdveAcIry/dS8HyGSh/UdyM63Uy68KnqFn6TPAxs1omWyhpSA3NhzBXGUJQhGFj63lMlDgApSNa0+pC

ga8d55C+NtQPrmRSHtVS2ov0FrEkiVQwY6CGVbamqRj5EE/+AKU3gSTmGqj37qQNqUiSzRzZklnOH5Gs

8fsWTCvhP1DvuJIIgIJIQ1/UXjhb1F5NNjUeWA/R/T
f1hyJ7DEWcdKZN8bd3lyT92UKVtwFwF8PPhSubvKxd6uLH4R1+utxdTvyGOGbi3tJruau7uCl2xkoVqu

pH80mmfZife3mng1HauMPR92g6lSeFhH3w0yc4Zk9AbRzgBl5nktFdyIzJYb8VOPHWgClW29W9ft9g/S

eLGZpTrcKBlIXOXxFyZTvAP7AZseV8pKED6qChMnTY
uNJx+vuIiwcRgHNp9Ny71NWmFJ8HQ3DPr3iWr4ih659QlwJBkyoVpU+C8sWgod+sCs/dKEtMq4PwtDfv

U8fEdA6xUq1CZRpBT009Kqod3YW6ommhhh00PjHlnhQfgAleeRp3ZaKUSNWpWQ6tI85udTlGE/AbJ8mM

c7bHjBaV3O1KQ846f6RWHgnJBMocdUn8mvre0+c9Dj
WQQ6UUIvWcuTZ4Yl2YT5LF5g7rHbAVeUeppd7rRpDHVNJnSFwYC0ATKh3p3UJHvlWfrID+Kc7ottVVs2

FP6/yuaSg82+t4v7+qhw84cCDWoH1PFdRBCeSczQ2Eo3BXbV46jRbBI0PthlvTkvfXPD9y+Hzl4Bu6Au

PCbjTGBiEv6ESqv+8SMry+073R9LwOY4aZUQvs1a8J
yvMqL4AYMrVnoYltPbtnRfp+G7iOU7Kbj+o7AlT6d/QJr4aZhY1uJ7lKHNDnCn0xzVQB5eqR3Ng7qW9p

ymPrOiEpwcPQQZovvFzSVpxMqgBK7A7jjSLvAq5q9Hn42SRw+bs/KlQlUvrnXW+dBIXDuIdLuUdu+BxE

kaxMYo8bRqNKxLdtoKbFnVIMOOdes36VYIU3ww2gQR
gEYrDiXsZvVHZzgmcr+C8o5zBSopWY9Pp8XHC6WmYlkiQ3bM1rpshCgZnSda6fN54vOnqGoraOisoVz1

K2tqrT2CfRAYYxPgjNy48aAQNYShMedW1BUeChdTiisIVjpXPnnx1Sniy0diApw7n3bMaqzHB9j1A2hN

IMER3x1rdYXaCE42sdpOzBlRabt/aUX17GgtOyL+B0
ZyaZXvrxf+fNxf+GWdgQ5H+gY0UkoiC4ZPytyHcKi0TK014vhdw27hhulT1/8GiaX85PGiTbQUM4vLPv

UK3iBC7BlYHRR6CR6Qk4JUaD8nBtCgbFojWzRP+UMw9lCdgrdD+00aqng4HATD8PNcltLUM9YYtgi2nY

mwMWjRUxB4eL6taplGVnHvH/V9NjMj2aJbvhe2ZNs7
fTp4jxCbR7joR3Skx1CwzWvR2xvb1dKvBuSl5eYUJAJRR2wzrmjXI+UvwTy5RuOkziPl6Q7RCxYR2dcy

wzyxQM3uG4cQmDUh8qBD2mgBAK86vNzXX4NYT1TwNlxv5Pj0rsAK02OjMxCHsdqN+p6jAyo3eEI2kjiW

ZCao6Qr/f5ti8x8WCXf9tt0PS6K5o7PpUuzLyTy4QQ
Az8Q/aPT2lW7FZntwGKPRf7LluhfNmJGLDcp0UltK+54uScLniM3lp5rpA1LB+VJm6V6UEJ+utRa+049

Z7i0/cmNDUDWoegbuwk9+BqUS+XUHdordDvfvuqG7pEsFGLu3bipFAwm9ZD7B4Jc2J6iW16c+1NjYm00

+cIxMWBzOXDUZfwGAjRCNxKPMrhOVfnl3mjL26/gFe
NTfTB7rQpC51sSvlXDq58qdtM74shGy4ETglo4GGsYrMezKVkk9xYB8/HKe+FM1EYFi6DUIlmkRaUAm1

yUUzNx0F3+0mEhe5ir0Sj536mg9YuATskkuiGCBphU8EiRzwvgXWvxem/kI8sLfqek2t9kTYxJiG+TIC

jRTLoW1XpxzqkVmxB5LO+RgQvGw7qrgh+3STdfcf2+
S+n0A0kx2SxiW176vF+86sBPYlKW1cRWMahZEjBNOY2jgnpadwvHnW9oGdwuWyaT1+6eBCXpxD7wOeu3

6ScWlcG92dKSzglZZ/tH5UB+11wshIP8D+YY5j+q/tA9XZO1b/1tbgf1qcpxljRj4uOgJBIHcSwS05dU

MLAJ7kcWNl8cbyvORQrvycorsM1+fjwyQHeEG4kd3p
wfNz8QQwtmelvGmlFxoCB9wZCut3zPV8fHiknYWd0yLKkxxp24zClSsfTdsswXq+4FWEvI820/5VHh74

eKEVayQCRLHlsrAjBb5PNE/Dx6DV1e5rBRVBP3ZsBa7pwWMzlrY/wmQGmYJQ3plMtbUbdrTvHv6uBW7X

DLdvwwQzT59sWTgogpvoycmlCG2H7cH8ZaEhc0ZBm8
U3IHGHt034HFtBt9XFN/CmrxIu4UWp4+RA2beO0GqnSLtZUcXxmGiJ8XTSXpAdnPLgv4W4KftwI21BAN

tZzD+NujTaNHjGmDTFYYgYvqgLcByGbqxEwGGDjQY2uHKj8SQE24021ZDfQgnlEYEwe7zhHLlEC+b4UC

8nFHP6GeyAKSlSs1CzBy7ORsTZ/Vx9BlQ/t2UXp122
+s4GmO1Ja4lk0fWZcK8FbFwHJbW14MGJuLbk8iFusiskuqG7h55eTKJUQxpQAeMk59KMmMfDl0u4HPo6

TT+BZDDHdr94mhryM2LCKP/EaPh33f8CkF3/msJtP65e1jdOREX4ccHvROaj34LQ77AqM6u3LKuCOyv7

r/NwSW+CVLbYtDMy2wi8M3ue0A8A6zXrntGDYU+6Wk
cCDEqEcXqgb18W/nRXoy/m39W42eImPJTTqfyGYD0WebaYkP7UhvLLtjAmAvpX9D/hMmErjBkDETBMFM

h1Gu6yWfYoXlHvf3b/w0tW3ZMr+k/BjKrCdhuho8Nd/hAFlSE9hSBzu199crqNdYmhiqp5APi2wuM7Dr

WzLXm/OIGneR353dFsLs6cjEWFGBrBjOy0xMT7RvYY
d4h1kOaHNqaJ0EDC5FhRpzI0GfVSW5YHpy90iC6WcQEryspt9+XXi2xrCcJUdliCaDaMmLANCFoxl170

aFK6pWCp1VgDGyXUiVWSHydEAe+cgmCc+3RLymnKva4KvL79lZ7lt8BYx5AzSLP6WnD/JlvHy1CVM6K2

mJKNKe/wJ9+8u4M5WcYe9/83WfGpYEwU/zFWN7x72V
q78o1HwElSOAHviM1jg8YjsSRRcmI3E8re6e4Xbt1oAyvmWKQcEOlNLIhz4V8ZUFBWPJHRaweinqujUs

VaRUUsEyKkDGvWyDLoFdl6oCMVWYMYR5DffLFsa6/HsNFts2Nb0jtjqieQ+RhuAOOWS8LyTRDEoqtGgz

L0yQjN1HW8d1wAp55GEw+3V2nQoSGdN68FvbXKU3oS
LAURA/pCsBhWVgfAJe3zzMxS2Ky4iaUxujQIGw92x4Bv2MD8HD2lmkZr6z9VECg9mLfISXvhokucWhcZ+X

jTtWnQ9pAPijxZcEFY8ou7tzYjP2ps+z+pj3S2UWPEymMdrYxox0gHXXH0LXC9OuZUaaa6HRIWYXu3C5

lwsl5RReyekiZjHL2LAtm4aLE42xAan0IlGbXSneq3
w9o9jsikkdMjXm7Raq/XFu0amZHpw8WqX/UP14Q0j9o+Nlrg0cbH/Dxk/gkKmyEM9bB3blpfR3aP476+

JJheWnvdD7LzWDGcIWFu55sPNpb/AxOJHI3fUa4XdjHgLDChcsuj32i9Y4pgLM7LE+JxufaQJ7VaqFGs

IjlH4osF4FlYfQIEhcr7Mkw5L76byM+ZxSbzNyGueR
SPHOfNQOPIikJRyLjX7t0UCvERZAuU/y7r5VBfwNYD7NErfAtUrrSrh0x6axl057HFs3gaAZZ8eM6H50

faY6pVCfPR4URJy3QNYEDZaUGClcNLiArasB9wP8GrdfIZ+cosEA3D85v3iDtCabM4hDq07CW0nFsATK

E9qK7XRr0Xa+Gg+KhEDBdAohfwbLHxeVapKbW4c+rq
ZopxLdm/NxdfeXHxSME9AvzA6jRtuHVHhjmllZeQz1XZVxg6p3iZIjEg61WgiX1vcz/z2qoi2X2riEes

g+y56Ll8JuVEFd8kFpuDxub0RF19J3UrMgz/6DTGZq6YaQx6Mlbe76r6kQWIqAzN7ouYfL8MsWj9AmdT

kSPj7Zs82ovMKwHb+hcMyPyZpNnqz0wrGReGpQTpco
f55KqFCGrMAiDj/KfWders9m8+zbE2lrlYR9lVKRBc2dFgDP4SGqOEqR1U2x0PVbyxm/N50daU4jM/7c

V1SHJGM2l7Gv36VyUwwV9YbrT/v7L34jCCiAzD1iumbCFy2343od/ypSZc4YzzcqGL/tbYegfw+nwdnm

ASMaD/ntmhQSDTDTLyQbJSdYuMUR8ChI8aUP9ivbAa
Fbs8NhNXYhZGvjKbEroAT9Ngi+o/h0YDWfrKSXCOYXh5H7kJ+S5ozKt+o+V45jxkQR7Dnk2E287Zd06V

egUyrTdn/vq62ilv9ThT/YSh21FlLTuDaFOk+8al68mP7qCkMGMeYMtXMVLt9DRZsbRgc3AjAqrhTyRZ

jQjZNlNM6tCr4xKaF4qUrcbPXP9p5nySro+5/BGDGC
Bssqwql/ea/rYmuAdch5lD8mXj6r3VkkKki8vpVlcjH4dHjaPoHj8wwn/alO3mnndMJuhdsHhFPbBQV9

U8DLKrnsaY8Tz0brR3FL41G7iKr55/9Ne+zc216MF/ZMvXAiKS8iusEqcLkqLYFEnXzRh5odwez+HR1L

YSFywSjPCjWTb8SZmlDPROk8YpYXP0gN4+mp7PR/GY
Cw5Us5r7pb4dDWk2niJpN8kdrtFnWUxL0ykozqEsHThfGqsdcSdvekmcLljY8JFMBC7Fp7fYlGrbXg1P

/+wjq2t4hSMwdRwFl98GNgqjPMSGCC4K0suPNXrIeWiK02eZugzqPn4FHhBMkFyDElAzsgx3HDXYud+V

H2qwXOt0kXSobIxbimcoFlDVujmHMfAHrJigsIoJ4T
rYbl7sGeIK9ZMgdaqu5L15pH/itd5dhAUCT8qOcQ03SurEmSwdqXT9hndKArr4vjuStTSF+XMUbPMNmq

RIJQVBXCBEW0A+m40eUsNNtHGVzv5aDFPS3JCLrRv5k3fh1XVviWkkgNW1O3J+laTxajjhXILguxCMTc

xRH/i3aCRxgkOTRiNCrmrbAx88u/mdY+98ca6EwFLh
MWhtzKS5oRrHbKx8gCaPFVgcP6+lxZRmzcY9z4EeNUTLJ/Tisi8DSXjQmlRiiJfj6/APMrC63GDVxqGf

i9yOSpR504We0tAa2gkIYfFIz9sYUMlDynL/qJK8AJak+K94MjfQbLfFBQQ/blIDyHYPQ21bdBn8Dglv

p+maCzW7zUosoLY8F+MFgeXbJ7bdp/XRlmkBoirGzk
a9GkFTRN6i+KnhEP/1D4CD1qxJslq3PhGeejeSjgsLUh2J/oT6L5vvh+4pkxBSr/a85gkwHxxUnX5hLd

kj051Bfyse/pp66SjA6JUtHluF7cNTxYbh/4A6ry9tOAtx6s9aOvqM6lEsyZ1JOBZn+BwXNDMI8qrDM0

Tpwmkt+dIeEDTdU9/t71FTHWjr6n4rn+2kD3NkQO/V
srnYNDSBDDyxpNnSaJRM+XS16gHpRiCrfm/H58arZyCq7oV1j9I5wL3DSHfxr9BkZf1zShdp8e7jRmJX

5mRCMT3a+ytYSek4hMsmlpGbKiI27PhFieb/flvpKyroBmTahNV5/MEbXrFgu++aKX6VHTkkLpe2N5gV

Vy/DhaS3z0vSCXzJQLHso5AybdYuKqXIwV6TocNtMx
uGPZ1oDTQDvKYRKwovzUSNN5QCUJB9ZjR8sFqephgIADfopk1y9khN8emPwcPELK2ax/ND4AQwMEi294

+TMYL4rhVbRr2G1y5+4Od6axqcfl82dcTSTncosP/MKFqRwMUlFFxug8H5f2rpmAPE71/whs8YtpldeF

GTYlcaeUbDwJc6Zsv6rQkkvFsP4JELNAgzuN4r8nM/
cH7rSNMrVi8yCb98Juy33ikTICM8EY7oDZM97u4NmZ1QW51qMpToX79Aoo5IeEm8ONVxN2pWL6jlcuFO

ds31o3jnhtYWcwVKrVrbi9DclzitrNy/BiYm5Hv9Ny6kL8bM9jcTKyMEJ5m2HPp2RGRpGxpiUPNJ3Cbc

9sxseH8H9kM3eTIuI2UDddvoEAeqTzronKgb9Xh+Chi
JUOgnzp/IyBrCJadws3AIAKFLqZB/vRDQ47A/7INhsoQrMHq9wl2h9WgkqO7/3739oB9F1CPVyoqYanN

tHzXQh5U9y10Xb5maprrW8g6aY/Sw5t49ckBJ2lDPRkTte+vjxMFMT+tckj8hyvl2+3DtAiMdkbadMTw

w6bbKFEzVU7G8D/lB8YrgEMtRwDmoEK41woOQVN5tg
oG5g5V4PTWy3RpSSXRVVUym8w2qPKQcBzRcFbecrkPLrFp6jZIbNDnVD/iCpmXrkFPwFBBM5aZTZZoq2

Q/kGDQoH1E2qOXDK+GwwIKFfUbUfiIt+ko5aXSXVFDid9qtM0JOQ+04oHEwYIQIsalHsx6hRzPBKHsOY

FCDrLESVF8XxOF/9wWAzi8o0rBWwYLXMZ9yhGDExvX
c60jM4nltqFHXEZEs2ghi3arLxBtqM9cWk8pujMLuHfx7kEKDEyRPqip/o8bTydSgYeUJxcsCByZ9Y2k

gADeEjn4KNqThtYpMIo7DvcmS4U0+iWhMAyQ2cG/S37A3iAp9hxu24nxKuD2STmGb1SfjNKp2caS7i8Q

5UZEZlYqpw05Bcx6W5/VcsjaKiZJcNEvGQeADOXObW
ZwRqZL6s0AELSXy4VsUwOzyH9UlLBZmqbiSaWP7yP9yvFjnvjfeam/ptwxEdclQZCEITHUIlNtCLsXuW

sm9oQFxhhi98cXp/+FvbI+hBJVnsr1uVOa8/3RJyY0mDWMVvKgKqMZj+y/x/K3REJU1LQai/z7Dqp+P5

e/8LUtUqPWZSvzu9LfxkOS/6ZH48rh12EIXGG1m7iE
pExTXT+Sb7tnd1d08ntRKVPfdH2q+Re9+uIkp5QXpDPfXEbDCes0vJEdjy0EK5x5o8iuIx24OWlkxW35

jr6JyvibIQGtOF/eLz2wyvIumSJj9LI8T9l5+nAdUY2S7xHaGTOdlnP7LQzfuGxfLrcf9Ts5lgI1UZzH

Or8YIcB/3+0PN4e3I9aSWry0Lf8zP3OKU8/npy7Is+
5MA6hRuHbzFxTmPWZ5J/wthB9XJtVWULIo1pen/F/T0VVe06BS2byytXHySrMaatvgQ6zKUBBkIF6AYw

dKDZbWHomrA6HBhtp9K5EK6P+LpPmytKkI5oOUiBfyhB7AxTg9Gv0iilZv4aQP/8LHwKZmGqM9c5Q0yq

j9lj8e3etn6wLgd/Cp03Obp6v8VizXp7/hNlHO4aev
NWKKbHCwOl2zeWn3012KfIZ1XyOmjsC+qP4zhV5TzvMSVSppvumVSBFGl42ziMWVTpF7qmVeYrzerP4d

wqH7ujzyXYuSdMDhtfgEq3MN26AEgl0OoDmvQzAS51cEaz7W7f4aiKb1JaXooCkS4VK9eDBPHTyOQ8UX

zObGZcgx4B7h4tWA6REp+vK1hTIM0ko1He3UkdQ/eq
DORw5k2zQzyVj8SY45P0Tu2uPk1nbmj/oL8XIEw5OALzfPG+qI/I0T6WnaIrsJ3MZyhKlavI/sIvxZ79

M2PmiKsVqTBsMkPbcbdz+QZmtkP4KxwGG5c5uP/lWxP6nVjQVs4v6x9LCx0OgSzwkkdU+AEV5LFiJva3

57Qs6qsWdNMo50fo9PYugskMRuW5X9iKZ0v5k6lr/k
Y2SPhanJKqYw81Y9Vn5YEa9fhrx/2xkLy+RoveXJaUAV+qAgQfw+WV8Zann+aIawkxOkXWh/N3yhTJ6/

jvds/ZkQ0A0CZYFcmnEgUsBR6vfTUC5Sarrk5hBQTJG14LXcCDQ1WzltimSbHu2d8T24Zu+IJnnp4Hs8

cizXgtOCBPBS1N9zIqhQklaMvpwi8EzMYDFNtaJywh
uFLOux/+dbAQY/McQ4ZNgaJCTctBEpY1HxZYhYu6eyQrL/pmDuoiZHxpfdr9NjbGkAOgf/JgJvDe0zfu

0E8JHsJ9yUsgN+NndZr/1ZwxijXDfkfOz1UPfeFP/D+hBHqqNc8pTnZqcqr0ghp9U6z0L3jWiNZ9mAUV

PzZvnldzcr53eZ+u8sNwAp3ym0RfktIqhgkmaCjscr
ucgQ1aSYtR/L/3BostCoxa+iPNVS85J4RPv0zK9MOnXTvr4+5+p01rYeg/42lhyi2T/Yb32MRdBmn8qx

q/VWeywdDToVXBoDlokgiQ08P5K0QPt+4GNmYo9JaMuhfDHaUs3u2bv+BVPLL1II+DFk80YzThGNLPtk

Rx07HsU6vARzfvHGB9FD27Jlw3bKTYp7emrIlC5PR8
ozIJstpshHqOKmJ8hW4/Jw6jFQcvr+bwb/OJwiTkEHSEoeH6k6iXCL06KDN/hiZHWJpYM7JAcqlNfXj6

hS+7ULJ8GktWOLEcRxUlyU32dMm37cQQgjX599I1R+giWYa4S4Aw7rqLt3WB9umviajMbIOaVwgO7DPI

4Y/KApa2U3/Wlp4MYxonIiVjwL6hQBGai+Jog9txkC
GT6SvK5jF7+2b3Wt8rLQaIu01tZ30dfG8a8s5brMrJ5n+UIOl+lkr2Nzlj2uhv0CbGM/mkcM39xCDGc0

iONh2+0ZfqnzxmM579y5oCmUmjCSEq+hvVGwhrpvuVpH6PDUjA+Ap6fBxg+lO0wVC/vZHkdJp2Xx2YuT

ZtTuKzsDOuxppxBM7K5t+a0CunyavQh6OqGZ/DmyrK
Jj/3rsWwvrF9Dvbv+3mEIhaY2MnWx8lZ05v0dUoQvenTTH7CSqwEj/RFMu0KJVJSK7qeGIybm1hqyx1y

X6PMh0scqrb3Wkxh++h2pibWjJEZlNgJgnCr/k4an7zymbOLX7qCWGd1TLZU9F/vziED3iYz0ER8Fy0F

avX5wBkXWJOvp0DWXUP0RNV213zfFa+Vo3Dm5D4U/C
1wpnRi5hVLaBqZ0kECjZNGcOL3gz7zhjIW2nggpsAVxSAd53xbzflqX+1hhy2uAmZI7btklgzoIXXIYi

R6B/OAdqT4r8hTUSP9Rk0vKpyHQGmHs3e5Ao6ECc4XSuyuADMCiLrgaen8ZaORMZXW3DfZeceUqJmY1A

uYWxqFfEiTHWJlwG4zH+u9BGeRrWzNjR2e7W0CoRxJ
eER4FNpEVqXJzMpG2yMPP3/n/t+IANkHRh6SmWPXCz/1PAkQhh/bFPrMf1osGVIiP1Wmjzpx5+YOnrW0

2Ui4iPwWbpNyGwmFdvr1VW5kKWXPn40uHxmstGaBZe+eqhO+6iTRkkWO98ofbQtG4WL4374G32beQW6e

oodDvyYweQjspIWJDP3FRobzNWJ2GPjaxsDws4ep0f
uy8PvQCRY9L+cXhrvaDGa2yLEhfmwkIIO2BzdXLvPGF0yAmaqKylwbQTqlYIS7gTjecglSOIAAyc656U

pWQL/9y+4YvsSTwqL9YaZSBS3OXwofa7DfaTQemyiYQm0SmJA4/S22f1snUVCGVjO6LlHZVWGfV8oF6B

5XtR/8iRmrdJh1D3vGOXqIZ09MHsQh58TT0vszEL2U
qGH6yE/y0a16/CiCZHVYnEo4L2/nFpdVgxhjCGZ3TB+47DhzI0Q/OOeJz3igbozm0jH4waPvay+XFW8W

Vnsf/mZL9QuNrLW77skswUf252mxExO5AnZw0EWacDm8sBBewuk5fndO40aFRAsV0NgClenurUi/s9hG

pJz68nOYy9PylnBzFA+nw+AqcD2at4zxSxF7T8LInj
1j7RJ18avHQQhNXvsgLdWC61Wqd76LswzN+msLSXZmYManJlhSmnWcCsv/ez9oCiLTkv/usDGawlW8zN

cQMh5wCwpFHokqZYiN4+QQ9GomPQYFE8FKOiLTIku7R1cEQZMilha/53/EtLVnrCUruGejD7q0FrBua2

U3kpq8830MklkeeZlwoEj791OuubnFdYWAF8PNoG8C
gWHjYaiyj5kcbaX6QML+fZj5IEM6hP/J/ibYdzezPJbsEEyp12LMxPrCAgvN3pu/vd1Um7OblX1CwH+p

Y0JeptFN9wNzfFM1cdJ7ljuRgbEZ/IeN/RCRl4CYhniRqUDe8GRmzFIjWRa/zATfI+85iiZKSs+XaqfC

krpLpP7p7hzs0/9UKUYkm6U1TFFpywHA86lGw+s/Uo
hyzHgIB959Q2h8SIFjB1O7Fwtwzb6qou1NTUXILsF2CtMbQIpO1zisZWO55x5sHSTtZresVhyvr4p5eu

MmhR/Q+44Ld1TfIUV0QriaauSURw5JBdw6jfwpvOTUZl6cP726SdXFtqmWwgDxhCi8iMILj12sIVIq8j

RysQd+evTPjgBYFcWG8UQb8NWpRnrVtdRbL4vDRYMy
8J8pbAIGrx04Lisu2w02hmxm/onGFEEffzFIHJ4TyBlkKpW2cOrz9xc1p4AmqMFL1g62nueXNlkQ7hCZ

n/92so2F/J8Fs9xjg0khaNxWJKHo1kvUumlZ3R2vD9PxQwp8yuxpbBfWaR+N1Che75DuuCipGNb7xk5b

CzllGqiQ3Px+3SsRx866NUXuY7AvxsANK3ZC/A5TFJ
RsWpOEUuFbJE5nfkkUuv6lrWOFKuAinscViEQGRPOzXNl2f0jzIIleUKE2HvKPDTtOiRSCP7uicijDnh

NVtxQC5/RFZrlB60cx1lV3oZyVCnjl0sFloynmsFIL1qjRgk2yZRMe8TlMmm+sLtfQ7KwK3itt3+Tvq4

PFVt18cYKvn2oikAmhwDAvj4+f7LYSpVQ9HiBsAnEY
U+26xlx+hxmMuLpF3jOf/Lk2p/ncmTcpbjHfX0nACJ2umK+bh2pIBMZxEBKPFngru0gZNhm1vcSLMdO9

mZatl7vfjh0I9zYozH6iy2BXFPmDzYSRmitfeLpL5qGTMW1gBpMFshrhKchHuTxd8rfow5b9F+Jt6oB5

kxjKfV4L2+IxuvXh/LOLx+rEWNZlxFgEXt3W8jE7bh
wJTfpqBAgqTIAk2wRztQhvfckHzsiVrc7GAnbfNl1yVFhasqhi3DxiNYbrMI4kkCgKqxUvVJXbkGJ8+p

faNM6E968zde5++7rgG39tlDDNl9GSYQSaCE9fmiUxyvZonO7ZH26rvnk5hI3tmC0We9I6QeRyRWpERq

RHF1iUqkr2YZr/RuG7GA5TXY5Zsi5r5nLwex01B1qC
JQkY/yGW6QWIMHc5L4keDctY0X/ef03LXv94e5ZPmVxcCkFE/HxE9h/JdhOr2gkl2xZ/DWkCnakitEXk

s/q6zibQ+5mPvkkGQm1dwJ9fwFbOxUI6VpWjMj2t+miL9plifO2S5HYVEVALHP/vMaNhw0Bzt0+rCMIC

Cwlodl9HF4G63usqQgT/mqoPL543g3bFTRHJQ7861M
RcZm2m3XUBzTxdL7m951wC3P2kXzjWVIHOLGzLjwWgd7qWbzPbIoBriOklqOwbLiV3OSkGV2R6Aqd0W2

i/Kmo2S+iG+teufaxqDe7Ik46wioyIyy5M/Yx/8WS7H0NwcvDhohq06VWfU3SxCe4KZovOq965JgaVKG

KtbSLV17MmYb9bxeylVWl3dc2K/TNitoMPcSuO3APQ
vBS/39NZTSSK+bUJxKdTpxSOcH79TObwQcbSCftDAhAVjXz4EciV2x50zeB4DdcyUI7JsrZMv9+Vfbi7

fDBAoUB0S+rCuIAPYf9Hyrfl7nFkj55Qvqyuod8rh5hne976jJrM4PFVbu+6s3VN41r0wznXI0B6g7jO

8hNBjBCOyW+pTFbhUnWws312HXsksMHnD5CUJ246r0
A85ukFhc2lk5KkBy/+DIliR5c35oPFjQcRd5T7KuQyZw3ru+6sye9hUgGk6nnQRSafS4nqpb1KsMamZr

V0s2C6I2niw730kmEZcXvzjSpBlXbab09mp9SqbJl0tKXBieOjHddH2b1wsCu4c6G/pvvTETWOqjKDyM

ROHMgckdsYZTFOloOz3cqbicoLn0yRDaCW/chHsgqG
P805dzeaBZkWywafc7jUUPpkoaxuiaUyQNvu+IX6JaCoLPgSty1v06fey+gbFfpJIkvoPhHa4BYDxU+D

T66sRwYc+axv0EKAvmDk+qUhFRYauTpxoCNS30oF0t63QlJhCwKQZ83comNMJDZcH360vJ693Ci7KZ1m

xw3HGaErQ+UNfgDEo0+0bvUgnWdOpyydI/J/asChAS
l7nR0WERBNqcK0lLgd0biNUUUa2gRPc6FWxo1fBah/srXbfymsRK7WsglOpv0yafXZKeyMisNrne3q0w

VcpRmUxeyEDwyD05oagJ8HsnaFUyB8AfYCw2/HsQMrB0NpIsV9+dte4aRDrC/ld1b9KA6FrPOTIo1cWf

TRm8OSOMVNn3OJbazsxgVn3LDmB/UE1vJQzwzJ+KXa
OiFN2TQTyQ7bGiFzgX89rMhyAB5GxM+I/o/Vpq8WmS8Q6itf0tdMQ5pDOLrEvrfkfK3I9zv0izXy2xIv

vC71U7DSqLyjdlacZqUS9QDPgZOZ2Mc1YR6pKyWjwG/WnEVvS7k/wlUlS3zkX8DHyf5WvM5aW9KGAYwU

PoLm5fhYmVT+2dl/pd8FyIfS+FnBnqeLbxhbJdskRO
p/8VU6niFHBrNHTHp8pOKWp1fi82gR8LpZk4h3GbSi+foXk7qjpW0OBtbu1qynbTy1qByLL4e5ZqGzc4

JumLDQa1etV5KGRAKxBPtyOfVU4PWqvZS8/lzbAW1kWpdH/6UdW1u/P4svIg12mEGAA9B9QK3qiTuGp2

nDWd0r2C8N9CKjMM9WFhARfPYAv71KdcmmmmLP1Yl8
5w8dmbCw7NCa1gRp/PSxFrh8nYNLui73LisRUDSH1Bw6on6ezHfntms7jmUbpjXViajesa6qtbmeaIbX

hQcnl5+yH/TtzmuXQdseccca92hzYWoEJcVbNR4qb7I3qNrCpWiAOUFHszmb8SBGGNKnPt286ET+dXlD

p2UTHwdgHnKxQ606VBVp8wiYdsEZGB9kcsrqB5RhFU
Vlu0mQAQLjcGErBtjghBIgaGQzUyLtpqNl9hQ3qvHVUoRqMY+E5Pm13FoQhDOs1s2HFAYFXxzVWNxAls

yStKsNkKpmg7uuXwWo+0rn8RCmwFWgcwEJ21DRV2Me1MiIuB68hq8qavDWH+mmMTwNERzaCWWAZ/HlBl

cYHdAYVPGuhHo3/wQ7jKbA8UXdEOukU2f8sa/25H8g
kZh6741couRFBTt9R/48M6kc9V/FXjGKIXeTv5Dt6Tx//44lQ3cl+8tf33mfL1CI/ws0i+uYa0akG/DF

gadzQb8c+eZ/OjB46fjphA01jKCTsf5ww3VYZd6QM2meWVmua0lS0JjE/+LJ3K3JvBhmF7f9OENvq271

NcP+Z6zIJ29/DEIU8DC/lP/PVfO+kFoOvanBBpyh8V
ndRb6UBOiz7QVcKyfRwP8yVOJx9GwP63xMM7oXGqG4W5HE3ze6K+n6EkrBl+2ZRSH7CifUUD3sdjVT2O

OCTAVIA+OAx31sFq9xVqutKLEOXukrt9FQwF8rXD018H0KrfmUSSDe98RwHwTak8VH/M3t9jGMfBRPlShbxi

4Tc5ti7eoFjmPkF3zQuiumWxIW69JaTPcuQzGIsq3a
gA2Jywuc5q0tmDSVVpJhAxrmhSCFhEil1ecCprPFX/gml9BhjZMFytBJeDUDNVBllmyp8R+3vymGZHZg

8DUbEdqiVhQB7eu4M0fDy0+BZddiQlnD/xqkb6/E6KbHkh2fDBN/GkH+rha2Rftm4cJXsp0P9w9UhLfO

Nf8mHsYv5yTiy1rjY7ZAREsMQ8jiCA4dWjmM/s45A5
taLGTX2/Gnx2ZAs5rSblZAOSeLjvhey2OBzPRX6T/WdNlyM2QX7j5EWRHViaiDfZfQKU8vyUwg8G+4iV

nqqJETgER1KnnX6ipS9gyPXkjZLXFMuqJ8QlkBIStXS/FXmSx7aeAwrSaQwbMZZh9mW5UxwuFPN5rNxq

Sfh+Wz2TubjCP9mj8qzhtjHpgHbmfmY07fLtkQB/Qj
EasLRweW5WWu6zzzKGj6LyJwJb07Kwp+Tqs73+745hcZW3vRk6klzX2mDrIcGd5VrBl6c3pC9OCmtyrj

UxRCvFBoGyUviuIGgVoDjJy1JbLbONRn4ZSiylOw8+OaLO4GxXvbyQWlRJKsSIn2RPPK/hIsEOOliATc

WMwGMWc8Kr4tTOhcgp6qAQA5LFSg+mbkwJrAZT/2gP
+0wPfTLgkpU8bJmNWMF4a2qQMEo+ofbTyDWdSiSZ/ZA9rMBiWUAbD1D0Tt1/TKVY47Qt4C966/aqP9ku

oy8loaaZcuqQ3TEC1Bti2TIW2E0pgHC/WFS/e9t8CPdnUudWga1bLpEYubiF1pAO2T7SyhkWx1msUO0J

Qx5FKd0yleRWc0W6IObCjKLv8sPDV3b08HJiIrv+Ob
T/V+ehLQarC47GhD0iIIqt/VxYzrWcdS/6HH9B+hyJd2O4nmxAci82oUI9hoi1O32gYhZDVFXGNnOO0l

7DHDC6MJ16qhw4CYAh6mE33VzGdokayF3dcIIQsNVprhmWucK4zZ6cqIGtVUhzk3L2Hin2Vdpy+DjceL

12PkNbv13AEejBGdMbMyglNxsyT33TkXAdpY66F46L
VZZLaY9JYU2Nqi8V+5tJyD7lLI3OCK6X10l0DwmuUEyINGQFobJmHOSeLDBQll/lBBxiq/vaaQbOpr9C

wCRQMtSIzFQKyx2Csg2JYl7dJDhLOhbicCAxe/a+LD/7oo7apwjSS8LXQQMB/W7W8QGz6jZgvj4ZrxRA

afgHmldV7/a7HPQg2KUJrmHzVaffUyuKHkROW6p08+
OSWZdijAtckHXLA9WttWFAOSQOguVMBaQLQ0AwVTtgfI6fmPusuibUficyDfF2GTWx3KDCVOeldhpg3U

nhBIA9gE2J2V/8WnKI96A6NF/xvtu/3vtG/XXtaZtJx8TwkP0JxHcEmeyZX7BWVSlmlR/1KVzXWcUATY

4lEU3BHu7m1Sa2NrTRbFc1b1NQQHjFVOFf0L0I/XJ1
74D0jqlwj83UibYEPA0fpxDU2lunm7AXsAluP7OqjhVyxRWxg6UUkCYBpKRNwJff9KtjQ4agUqpqBDXC

QyaydlZSXCvRLaKe1Vqvi8kXOEPzztYWyPpn+J3P6Fj7LfhwQBSVS7APREAP5+1GMjn4vpJMnQl8XQ4p

WkBt/2QlFym8CS4Erauu8mkan/EykmTASIgtQ8hhXF
Sdsg5JWgerSL1vN5QRH7SO8ZIvycj66FdERSesITFYgbcDC8E7EyOmiN6FCaccX3hPMF6dnnrwX7OaA7

l5f56r4GZFhv1NOZv2qjxDfg54k0FIeeXIOvZysfwj8hwhoizMHniHiuVu4GXyOQqJyByDL/Kf5NmooW

xQRX9vE9spTi1wpR8cmgk2sfG5Wjn5ZHoYjwkkrGZT
3JSd9p66QXnBSGBNqOb8DZUqIBrrAxGiHWX++mVAso2QWLy+sE+eEg4j3OkUA2B4E4je+P46/4vtfVC/

zdlB3wruLdv4gspbidKXLCesVjHTr9iHO0lE6RYXN4vWu5M9aOGDqmgZvwVv05q5cNOBTMI+fsxJkx8K

Ar/RJz1NVv3APF68jaNMmyp7lgroA8sAWEIudjCSOb
0v9zrJjvhVRem865l/sYejZjjcoRo98Bu2+F6PV+mioFQ1AIkGoecK71SMHl1fa+TrhQ/NHpM7r+HSvV

e6JRkxGEwN77TB8zDD3Ejn9kNZVCX/hEFD2MnX+FNf1ZJa2KDgyQYnPhm56pDoRcwJ02jSrOm7j+BYfV

d67QgVNr8//192Ukou2U0qSwemk2W/pmxbFZ3JLmTV
6iKYw1nWpbumesegMuW1nofGQpUfdSbg6UKl0s9IkyW4QVZ28KV8r1xlDl0mNDCf8Xxc1jU1/UayNFZk

ejYTFS8FganKV2pnZOTvUr5V0XtWJ8nB4RSRdZmqSosyxKrbMlMeJUZU9e866jsp82rXWUBFTEVY4Ps6

Gm6AS7qwPcU2/9ZHZPLNwePcBVqAePsDJSM9O5viPL
hUhVaJdTsg00Rbsi2xl6rCF55pSaxYR8Wc1jO88xGsqxX7osQ/q5xhXSICXI04EhM8FNN9MYE5ThA7Jb

Mhm6U81WMqK39bNJohqHP1iv6mf8AEVLIjuDqicA1nRt/i99uWsnz+Nzya5WXX53lIxXSFGfMIhgnGnu

Q2I0Tael3CUkV38g7xdOH35lsRqZLM2fgoNHDoN8ax
/W36tcZhpAhl+VlR2Ei0Io73h5xPBrGtY5DW2w1MXlyBB4UHvaCV+CmvNUSupGEaaee0TJB3SK2QiPQj

rjitQp5nICQxBat6BN+BX1q37mg05xrmcqRhjTZXyzhoVKikP+HvJ/wTgN8lDMNUPlNbvJ2dr2scUllY

zMnRDQ2xjsnkw+DhIbLPPtNK1R+fGhJgNuabC8+rSt
HOyhXBTGu05ckpcGzIwT+DH/CSt7SwpFd5wgNczcYyxBSAs3vc/QFJ0f2LTj1biWUaOgUWyDC4KUDGnW

i8GhSVy/mGn+9xS8E/OMhXbjC1NCxnvl+qEVv0NXyq73aBdeDiSiwE0JhQ/NLYdouioE1wDhyyFx4ven

GGuSLtnA4BIw7e/P/nNvQs7Bu6iwbRO6fyXzYS5hdh
4erA1CZYKy+kdNwzLOepvsOkAfktZxufRd5HyhDe+CLEPmagDkEWpO6xbAD3MLm6lbAZfCD7T6m4xkXx

7d1s7ku3WibBNVosr/0Z7NDV/DfD6XHoOVTrzVVUcZtbXX8jaPI9XplcPGAny4Ee7acpeFQ0+/G25ut9

9hJn9eexmSazRqfTwa7xKzG20zUXzpRucfbhreFq3Z
v4y+4zJLfZ2tvYMraZJLMavMLSF2AP9QW2zMnufCdG8Z3NwFfbLGm9ZyKWduwuM+uIhYK0csew5LT5vH

s5aNdHBf7HeijwLN7r7uNt/7Y/yt4ac12w/eLFegBpyaXD9Fha0HzLsQEvEyoYneGJxXKCqZm94Bg7Z/

nFQu1VrDvwY5sa1uTtV57qhfmthx5U0qCQieSAZ2h/
cWRbzN+QhnWAA/5+gBdswaJmR9KOX2ubCb9CdFmgVKVL5aviHTHDMUWgbNKIq7luiT+tmC8cl3nXvu/m

W62IJE9271tHVTH9HsTKCm4Z08/fxALlD8+O0gOp8dSRTgCOsA+HFeRe3GJqzqosP9bnpfxiwoRBtw0Z

B5hN0djdbtUC1RtLYWtLsPfLeKJ5XPs6FXoyQuUR+1
guEJGKjYqY3b6S7OWni5/NqfisG1trNV1wwIk5jZfWD9qNY+sQE8ep1Mo15G3ARfFyxT8jEoY5rCvat5

4BLlNI4qjUmqIyTfWTZaBfTxaqBYCzGIukQitPJClyOXakZeO7AsBQ21Bdnwg4TlYgoODIsimdODaM1U

9AfR5n4pgmnX8IsXOpJcdaoKUbadi2COqk1h9O6wh8
/1hjax6uWS/byJUlWY7itCIqHQOd5/FyD4P9iA6TNCvYnwycHisD0uwZ5/6x8jW9CxWnAVxmhwQaKoIt

grXzAigCvGlDdG7yvL/cE9J5x2GMu6cGg1m1it6Rvo2lp6lYAVPAsAZUetYBi7+7seyTIMbLvSIV5ylT

oDTHrVzPVdEmLze/oE40w0efKACFJrQo8j9lKQrfI3
z1CcdZJDZY990nS2e66fs4cYVzH5eHr6n310n6/v7vh0cIATJti+NjRLfIBiMBQiqo9+tdxyhk4C59NU

t+jdgwcpMu6+rGcFREvBDo9X+RBCy9IT6q6J+EqJ/ibz6DZf2cxdHwlV4WXWzWsSl0uhcze9Hym2EDER

LE3soT8hfRFzT0B+rEq2VcKT9RDv3FVHFKozQiAYJ/
nWq82pNFFplaael5jC8BcwBq6ZukgwFIfVguVQg0MAJ6uVoiRR/Fcdr+1JhmWqf6ZNtll5sDuAZL31VK

6IVqpBo03wlfw1ySQbpJZQSf9N48hxLrhZkaq2s2hsDEUGTkqc5CecfpzVdFGn6QYABH+jI6ywbT1mqK

7xy5m7NBOyCgOAZgrei437pybW+qFFa7/1iey/dGfL
RpdBrXXWENuUeJj4PurQ3D4FlAYDk522/gy8z8Vl4XXSOCmhGhkfrzu7x6ypVsjr9OcdUQyzhAvsnKg/

l2+mpMfq39rOiuQ2vk4KEnqzAg9fJxzu8/9a68BFGw27OXg6KFefwXCat2+yjwXjrzrbVdpI68CagAgq

RDRWo8Wju9CYTyHVEP8HI8dVblX9MWe8emHWHGNPdk
6BRxm1cll1StNcG3oN6XTLEcT9cWsDisn0AK4PxuZ+imhMzo4od0hhNHUIY6kgKUPoZfq1RaKt6X2CoS

n7nhMJclOiO9sR1sICqYEy84lyNzpiyRtY80N7Cq6mg2xmV2pxbPWwyF9AZ3zeq+SPWimCjXwyBE+A1u

bDpkl1/ViFb0HdYCqNVuvC3RZNoBw5I40L/Eofx0xu
+gNRzDYENfRyyr+oy2Fpbqey5XztwWNZ28nL5JIRi939YHwkMp/Ht0P5MMzw3iS07qYm8agWgGPGvURn

67BqfUY3Jd4OgvTaugh1Q4ZnwsrQc1itZ/mWDropPYyYzOblmruF9zI7wmNjfeotZDF/BR7YWNkWkF9U

qvx8OaddqkkVSkK3HSDJc7rKzzop3Dl+6VrPfRguFi
qWd3HDRH3yqcGAVj24Lapc58+Vr9iUZudOW+7JwaiN/ZuqHZ/5yYmv+u1CwYwq1qsgpjDUqawlSmbV8S

W8Yc7qWvsn0ue5E3ahjCJWPI5I5of9A29iC1yIdI9r+7+DsSnpRu190YOq8se/vrR5m9iw9q4UhS47RO

NwiQvg3AjUbZgeVeiz3oInIpIrwJW1iHopUIz9kL1C
KCV0fGjzuRFCdg0+z5dJ8CGwOaxYs8lDoO0lZ5kjttjt74+EQ6438ZYyvGrhlapj4LnRSr5hcnAg7Llk

gFK2mJpgnv0kMnbHRKGNorxlygI1HMAy6NEekWG+3gG+6b2Qmclk0p3H2K8xjOKgOm9vlWsW1HwD4MEr

dx6CbCU+xLGIqk8hw2W/+oyJiyiTv8lcwfQgUZ31fv
+A5dMRxyJYAfTPlq46FzPcxG+rXpIES19RKkPDXmf0Qs0AT81kjinLPEOU9aCa40QcRcMGStMjLvk0ig

Km6ZNTTasWfEBpFr+4fYtNhXO1qRZ+FG0nybP6YFUsbf0wOBg2NaD/uZSvHWzbpINcFjg9e4/LZp00o2

d0IHv3H397ge6eEvg2r23XTaryf5ihY71tD27HosMN
/wYUjDndGblJwv5lB4HAdOKkjVFCJlPYL2HV5R/jBGNnJ9M2oNGYzil3G8sYZ/165HsdFaSOe6YRI3Q3

vFYQKQt7ZbfYT8JWqdRX5WAbuEdEO4a1RhP8sMZ2Xx8pE9VTI0R3zqNyCVkLlwMvTcb1VezCOW2ijHJX

vMN41scfLdF8L1/V2XBSEoBgEHZwLL3tKCU6W+i/A5
KoikOCqJLl3AY2itLbw7Hz/vM5f+N/KinkXC/FgTP7rOA3UUUSz2YX4BxyDqshVwHhWmd0zZMKaK2n+X

TYyEleGsQq2/ocx1r02x3cm8PWnvMHm12t1LNgIRQM7UtLT/qpWJt6p+P49rHroyZN9x7J0lomXJ+rXy

Tu3ifDRZ1K9WgrnGAg2FBudBt1B9FgovJf8sEjrws2
N4X9QzvYXksbYgbU9FLWNsHrpqoUvhg4D+NqrFGvKn7vig4cQCN48Zu5bcbV5s/Y+/DvYnj47k3b7/fb

kaYlNCBB5E6QwKPcZVOPQCvj7Z1J4/jFYDys5dlVhec10WMlAVN827+SxwcGLHZb1A0ReJMa/rfOwg9T

9U6QdN360Z9pndpm9j7fnnrnsRClvRddmMj3SPZLFC
Tmvn/R2VJmNIf7YOytbft+qq3qzo7EakGQAAEC76VIOHOj6JnWtfCLe+c7m37Fq0NRf6EDZrgTbWtBuU

kJ99doin+BL0GzzyO9L/tHGlXe7/K15fFZFJABQ6lHqbLl/SXA/kbOH4uORjGXcpiJCIUtgLkpz0D00/

emt2Q6htcEDJLLiIdu8T/w6gszUKAnbjOB5gko6YHO
mMPSCSUHHBJeHpcaB3FXWUqiJP3gRbr+2BbvPMb0WiuQ5xS+ipQxU30I0OGSApjN9i8Eu4+8Mtwkj1X1

PqXZOpF+zo06PJ3JmdCL9Yxh8zMMK4JGjzUQADEVLDNudCLfUQSRwm/i5u1jw59f87Ux0bTxzq2J2Go9

3LEBehclZ3eAepnei8Cc3V9rrd7BRFZ1GH78w3Rm9r
j8NkOip1FxVXrIGCo45c5CzWvxxb1fnAKuiAnnvrhqwYfo3OjPeANYUfZZJ+/zZmf/4CnyP+txdyUlYJ

7R6+gbghzfsd1VBVGmLT5d2QhHrq8ddGUZSgUDFYr3YcuOzVg0KZktfnBrx9wsMXjeKRqbwRx5bR86dR

soWZ3nEnL6bVAmg35ghvNNrPvatIvv0PkJ2Gg2wFwH
aU3ubVWHfzGIGJCrF1R3XmMjkmUmQ1TE1q+G1bxejbzVdY8F2swflhfmGN3VyQQ7cbBwPouJ6w5UjsaN

6pQFeHiLq0cU6ECPaKpW7eWsk5xYaws1t++TWls2oX4x9zeMWUJQBxMfA7UGYnLGY3L5AJCXUEeKYsdu

EUVyeoVdZ6Rh1N7eDkki/cIwDlzzr02HsL2v/OSLfE
KaPCBF1g8MLDBNz6oFksimL510rNgi/tPjyN0eqwsM3aCgeU2JI3xv2cRyuaPgxhs12eRBnQMi3f9MLT

4BKFveJwbJc6eO1jokc5l0JWpfb4ei+FqpS+WO2DVELTKujwaOQEsoQ2BpUgV0UDaSTfs2xtpy7je1Nm

BLF17sj94BwIExbox+L9EB600oQqlfuKQiX2Y4eaXm
oUBgHQ1aXMquCrij1UH8ZdaiLIDIzJJAJ1GQxZQ8nw8F97mrbUThbDSV3rj3SlJzRFcP3lDDRG2vhowB

GSdpu0IgV+jpg3sP+AiiOaeYvu+oB3UyD8tYd8shUhN8amTJvz8Pqs23HoKRQoNAEh77EqS5eF69ujh9

hq5xJWBWruK9DYiN0ywcpPiuYOpzEeoo2bfFfSeEG2
6HP2+h7itODjZrqClPR9mXfcw3N5jYv7F9YWRxfJqFTJxaRbjLxX4+Lg2MS3T8qolFz8J8YdyMYIWhk+

uD+AOSllaDkeZnnvkW8jWiSz7aGpXYeO0KjOiaaBbL918eymWIs8PBEnPys9WdQtgO1T5OaMZs8dB+OU

L+xGs0nJqnoyIb+6aXi70VQ9w3+9stImk/VEcorSvP
FkjdsgkAeWqwUtQB3n38grKyn/cozcbh9797ePLJQvEq0O0Zr3isqpuE0MDzGZLGU2MP7erlqUWJzgvY

QvNRUzNv9Sv4WdzR8Ns5aEDDIvflwlSvajMrVAd2tk4mRMvjqrd7bpaM58osV+FquIqxaiXY6Jkl7Unq

vo2j4+AN98ru7QZX2IgYb58xTrwD/H2GB4N5y1dsa9
Gto6yk0MbrYGf/UeCGN9m1zUjPAY1g0VQzuXQ4Rfc9QpsxZa9qo7W1lgb/C8n0o1tjX1qWeO0tfvJgQB

KHandQW4AYJf1fn3vnQ4U1U4wMJRxs0Eu5paFgCvaZraJyd4GfQBhVaGuQTSgv6d+WXVvHTo1tYd1vzt

NI+Q6FnSmH3OULulcxN+VrJaPg5zsB3i8iczM9Eyw7
BMJGC/Gh8E757ogcSE39U0Wh191UV0hiuYiW6XKfHdufiUosbKE8n110R7A+t4tT+qJpWt4s/eYvrIO+

5G0JgVv5eWrxBu7zW7XzQX5VSD0/sC3KjJL+LdfvzRt9Jx8RJWDnyP0pwI1323VQx7QczuVOF4xPPqrz

tWpSGqzaWD/6dlGFHz538ZUlkHJoziq6R31e124GTC
/06ionHaeYBt23J5a7XL6p6eYFf4d7JreYWteSy9KRIwbb5CtZ4T8m81D15JGAUkN/HQurWKDGfrah6K

M9kzIxXc+8lakhIllzsd4tcDiYPqroZd9QNQmzP1W/qpcAi27cK3q6xWNQ7t6GLDexzRCvssgHt4VW5p

MjlhwIA1ibbMsoOBigjJ79TryFyXWveK0hPm2apb5X
Nd73gqBepBKead+gtBb5627qrvCVhpF8VcpZ6poYn4amGmahwP92oy8ev+C7G0FEln6R5y7QlkHeS+ok

QBIVw3wzXYcWC4hGT4GhmW9qv6IANkJ8+64dHP6yHk+yhXF+FPNRdwHpo6GpeyG/53WUpRPVDBugTLe6

wB13DmFxuopeh8PpPr25FpEkr7eyNf/1hYH7Bqxsqx
8UEovtQVihbrf7Nmn2G21cEiFK2XtTrgahu75uFaKkDxgBDL8APSeCwAvY0f7jBZ9rbMZTtoY8AWTmzQ

AJyj9H8nRXcmhnpgK/f+O+7EUkfsrDYus8mjvSQ8ejzdrLnxYeQ/gOpXDaNYqVS54IEPAFRXcxXl/ggt

CTCY3lR6BwceS2zkTT2LgMFGw3R1kjJrbLYrih7oBH
yzAq3b0XMOIQWYr+QVYfb+ZRDtrFUPf9mbXGGfAR2rn3bYkzS4lTNHr4mrUKat7k69k6vVTSwJ51wFvs

tBqqcs6kfiIg4/xwmMDL3o8oroAcD1EzSyMX7DcEEYRk5JYdDs9E+fdkM+zebFFtRIsZF2jbQ/MxYjvR

HHOpWtrnvlrgF7yqiN9mVU14gQqjaFlJFyrNz5ipos
hS+9mzjlxqdS7PqgRD/7/7hJBOPqVBpgfB0a7wozL3zdlo5cdg5Bd7Dd7yn5V5DfIYbLCVlNLkcx/Zvg

xT+3RVpaF1ZUqIMLKiAbamd2doX8gpOwjHUnSxwV8d31GQoCzj7P4kZdjVJYlWNdgBMeS+9Sm/Ft9kIu

xz+2wrmOnIlpWQTNFu0LaU5jj8G6nxaAvL1gpzm59P
Pv4bZoIabepaCYHkaKl5TTAXFjCSXUh9K2gi/y+e8JHB6LusVSm8zzvg3z2ZiNhO8ExmXXi1z/e5t/AE

Bh8mqGBxqYGcrLorpJ30WMMOldZ8ZAfe4dehy7marl/5VGB+cTHPHJOyy35Yhi0ZRoRhSgMbYp97OPUk

lC/VJzxVP7XFU0XkMl0L9eoxSrAZ3HpV1Fxq7EEfer
vPvV5e/VUnwY49sZeVJRmFBDD9R5890dHFjaAGj9JY1mEbR1cpyoVF9gEWKm/bLUcLY9Zpu/nSl3Br9Z

NjLcrkujsMrNUf0VnsS5V+oNYWv2TWUYYtEzZ3vKTQtRgMY7AcMQM9Kb9kTLwkA+ml63kzcKnC/7o2xy

Z31OUCDhDCPIrGl15I5gUGWZ5a428oV+71ZmkawYm4
wYjR2Vv5gp6x54eEL/92DoYS/0QG8fcqBCxftmq1Fss1vTYocrLb3ERRPSeyBNDM5tH7a+SJAI9IHKYV

I4dkGjm9hmV3HSj4pCWV8FynTSAkXG/4R6DdIUbL1FMsubRzZ1KFfKMmvINlzxmeKfoIOO2xK8a2f36o

v8Vgq0GfZO4m7fq+o6iwATldXTi2m9o6Ttd6atH2sO
CLxUT7TqkEGmi/PcXLqB74QDJYm7W/vxPk0A/PoRmEMAgU0rkbPj6Z4MwpwsVY64LNShk/a8TpkkIMDu

9wKL3WLEDQcQqqeaB1466g/jLZueHe6rIaxzn66uCiFHzjTI7Y7fI3IuZrWjAIaLbXOYr2lM1Lg45kif

PYfgOl1alZxVAw94gDie+KbC/R773DB2aLNTu0D0+S
bgxd4GoXkdiYPk24GX4La734ND/he6leXG5UDTBHpfA0LDKwvqEJ7ICCvN+nKj0eB1MHQ5rTCvY/e9CS

YPs2NNt6CKHq0WFGAJ6a7jpDutAqHvtfY2fqnXjlst3zbP1UiY0QU/007lTO7k6juEfBdW2Wj6VHbRS/

FpRqwOJaF+Lij9ztKAnzUzXbgbZPwv10EwOfSqLkDj
lJwmnvYXHmdu5OMZKPOovsWGwKcjZN8tfgegczHMeL+msb0h5ylgJynlC6q2eJq/nEioy+AoQH8jd4b0

VsfI9Q4ZFWKA0r/VGCK9V8jW+vq964fZnpwp6npvX4uicB7JuaP0LcG07JmyIipjz8Nb8N6zXg6QL/KS

CKf6OqiJKg7zB0lZB70Tu+nIDSj82AwrWfyAIgW5Me
c9v9AugHn+/13PkecEy/sJk7J9zmrtm/PqdadiDiB/ssOktDtmzyq/V+g7PTnlMD60LPWojG9Rof1+kH

Lt/h0T37YAUzsxQV1Ux/A8lb3MpSxNww6VvWj9vxhUHsj3yq/idDGDaeHD0w+PzhjKzZ6TfO6u6+oHnl

MZHbvKRvxG5cZ7W9Q6GllTkCzz4yq58MXoR5xDdKUL
8eFt3XgJNZq80/8Ow1t4yCcV4sNILxDE1/8g9YNMTj3dXbGUJ6nhTtu42y4xdOyXTv0ZFHldyhRBpmBT

tGpGnAh6itqc/aVb1WQo9EZ0GCGU1XtwlJ795G5PKYYpPjCv+A3gB5HcUTa9pGylJDD7flqHPzqdmHUJ

m/Mqi102+7kdT3YPX+0SIH4d2l189UA77UVONdXG3b
G6MmMqQuTuJkVM0WM3iu5Q9MV+4uZd9np+YKm+3KrVgdFCSsbRz7zTeShQ9VWOlHMsaIzBf1sDeCvsS5

j6vHJ9G60+/tgVcfGLY0UzM4kEDWwaZo9Wh8oAnJ8X1oqq43987TA29ZDw/Zl4+4ZpSU5Qls8mijJH4C

M8rr5tFZJfj0AaID8R/iH3WbaxU3eV7CExv5WjP95E
aljj4c8CJSq9aBJ+2g9pgfS/buA+f4xTIUwUop+pqK/ITdfc5T3ges4WAx3rqfLgqXIs2ASOIfX/L0yQ

mdTyC9GHHhPv7fko7as/U7TEDlDNfr4M6iZhGjVNOHfShNBYYJ0Eb8fNIWMeM8LVZnQ3zhe5sJXdGYBT

IfyRC6q1yLC9qHJGrPyJ8tjx66bcqKlKwjurtfIO2K
k3R4pkzzKgiqrhiMSSqhHJ6DfCnwIub69qHIJ3amZVpJ39sC94WjgvsT2t/PZuHfktrNwk5wwgE/l7Dq

M6vDYrhmvRpiqrIOW80THqFyRhAFpxoFOSAflFfJul3ENW4Cr1sNqlFEtPYSFMKSrazMP/rYFM2lj9iA

kVVKUKkTKe7v8i9RCn4ll3dmcS1HqzAnlP8I1URBuD
Gn9ZemNuPHJ2ZomZTpU6m4wjfqk9JW0lrSCJPLJFDq1YxZkHQHRjszyBmuw8F4VvRIMIqimEN6DjORY7

RwX3c1yZQw4FQdo76Av1CrxwsUfomKNCtRjteZoABFNX2M7hbgR+9XCW0Px3wypCDf8dF+X9wvIxsKB1

73SgdckzozlZ7cquV9donO+dadK9DVLHg3jH815jQ2
4chkBOM40xf2JsAqbDa2GEEGAdIvO71FkkN/YUE20SDzgifnbqasQ/hSIC5po7Z5SJUoKDOS4KVBDl6x

HZ7qwpBvDEaz0umXXZGClnQwdDCHUl08gi3nAA0TX0+B/wikTP0RrSkM2QJWbhOWeNzVJILb+iIB3hcs

rYJjloiScvzFcjUOEzHAaULRLausT+bSKj8wz32B5O
zyPhv2dJdBBRp9zjHzS859QrJ+HzQSVqZQbg/KXX63X3ZN853b6qWsoQ+Mrim05B6OyHWz0R6Ro5xxtk

DcMhDkBxlvy1oif4X1CIv2mILsxPzOXTqER01QpJzhQlCi0yT9GB0AFho6zEXHjnHYJkHJXXJxbpI7oh

mqo4S3IuIoyqjtujKgCzOveAjMEsUe3CgZtdqEJuLG
GrojD18dTeDp+PWk2FQYxxfvdK+5Xx2H0BHssahMqwSUx3RkxrHLEP3vB8CWlOyfOkK0xX6XqVYyKjqt

DN0NJS4PaKT/l0hthruE7ha5RDtQT8HfneUPLS7hAIvT2TBUjuvyGL/H+zjN+ndbIE0iKF8wFEipeQM8

QXMsTlplOSxM08cOt99VORYjvsbwrH7NSRrC+0p/bi
iKHGPC+zC24SpiD/85g36uU0nPsEKhUWUJM9S8P9AQxRZx0lZWvzWA36e16JkoiIvryTnmYQC3SI3hor

/m9RRkmlHgi5SKVIAG+huLdnrbWIrbk96lmIdvi7eEXVDYMV1fqPZRythozA06VC60TyVakwHoeWAd1D

4aeg9E/TLTiz3A4gVM7gfJZWpmEtM280Lm0t6NG4xE
UqqVTKIa0+qri8sBO0EVN3ul2YGSIoBWYLCDHyaV6DyKYZDDkqq/5jxVAsrL8I/Ew+QI0JGOWLI5sUOl

XAmTFbnoaog8alw6VkDfPPvzXluhLcIg9N4e3NdaamodyACq2PjCyC1jn8C2M6vrWrO8K4+i/Pj82HDZ

bl/wo3T1VEsOwXWRWCMdGp8Q6hfqAvOAPNkAe2Qla6
LcCuODS0/8nFaLFAaABud9Wt+C1UlTHxhntadYpPZRXZAfea8dCsBapAER6QH9W8ZMhr215FKXleQdvd

ee3X9aLA3PfrWchPanf1Iq5gjySgdzAnk4Ckncr6houut2FT03zy7zsc83aGPDUGstIZ3IEvp6rAFiYo

CwaMYD2xXaCV9RRUQbsCcc3ElPVs+EcabNBBpxibGY
RC97Tgi561gSk68+kWm+VDPFmUtA2/g/BE0y/o4unUuEd+hd286wWPcxes7tiLqJvefVudk4cYUe6BsQ

D4ihov5HtsKuGd5HiqPC71UMciBPErTpf41uFx9W0/10PVfeWtJ6IAjVTjIIFyvEc4R4NUczPyWixomZ

7b/mf6kjY51m0AY1UpnT9h/z8+RLJ+JKLZvj0b4SPt
QLXVI1c+6vi1XSa/iFyLbtPNsnwk1M7LnQxE4J80USx6d7Ucsokyy8STjvrry+5dZSRbf17hTnM63xMY

r28+FMZzXq4R7uLlitG005T4+hIxFE6/P4wk7iXE+fNT7+w5TfY/uHCkL7DWoE+Z4o2sQEyFgP8d5dF+

PmX1iqc6pF/0sucHBiT4bxeiBG6gd7RG++HGt/HdX0
c5Mbfm/YAvRizMntk2RxY9Gs6QbE5f8ePUwUUNDZUUkGVmv5gXV2aLZ2OtYM6Z9ou28LaMJe+qzrAnf6

4uoDxNNs8wBGujN/4EEMQrEZ5mIha9ryZ4ZiNEDirs6qlMlY4ZvLdkoG5KNVCLS69X9ga9fZZtLa5lpK

mcPNGLRzfw9Bbc4QnGW/MOLINA+SLIwiSWvV2e5eoAX/a
NDo47q6F9BJj0cNodkzXN4oqyjwFELas82EbIAfTbeIEeVwXzOYnZtb3nj+hVGSt6gi3xvuFaI5TDktV

wqxtZO9nqCxjSEt+gIvl1sHcJak5ekNcdznRQiA6JzIECifMax3sSZbgs1/gcSUXJfltsGwVmuGhottG

xkGwuSgTuulmLlQCTDecTezXtEGLGeF/QifXiJqm15
wcx8yzvwWc8E7lG+xelYJ/BPiuCS+6u2o6K79X6lb099JxwVgEn+m4uIzSxQ/N4vWKvi19Kk5TdTgMdc

bP5SlJjUoPpYQydYKXpKKStyIybhvISLAlYbthOYLF7scWslwYYh74Wp+r+lp1/rVWK4uTIN2M6u6AOJ

eeLn26XwriWOKHpqmy25JGb7jxM/1sI9x0k4yFX2mg
S4av/SPJWjytczeb4/TCk/Kjdh84D2H4ZgX1R0qwtIvhJGW1GD/4Hbv4Z4Abbli/jjwcyS4/w8iy8Zyx

p4A0s8RZJwuTqQE/3cTfTDSSy+vH+9UDVEFqGu2U7f7ibpNc4TwRbxloTPiFeEJAEIZ2q21z+Oj3zHOd

ClTLm7F6GtBbyxschq93F6UEr7m/Q6BEy9Hpm4vqcb
yVkagghvj/8E2tbzsQwH9AcDqJuPVBoJU8hI/AY4/hwx6jBewKCLF/3pC78e2PM1kVwvMNUEcUdGnxSu

KrreFaNJnzCzVOZxzKgmf8zTMd/1qfsn7Z6YzWKw2BHHlLRap0U3z4a9wCIQ1+pn24vnqT/cK4ULfgsD

61MlnIY+5Vfn1UgNOUOOd2IbdUHZNfWR3R6jRYl2o6
2nteoXO9x4a+O1fB9Tt9C7oSExZoOuVqEsc07INs8xDvZP9/hifTEgxpmgFeL6s6bRZ1wVZ4fqzOZJHY

OxFRbpA5+4wIqnSkz1hj6tB5xDaUNYsExistidjd3iSCGmNLIi9Hdwv7EyqTIA0CRM3jHaevmubxWefr

K9Gr9k/PV+YotIFiSIaDuYRU2SVZQRvzZasazuJ7UD
N8kbEypNA41whUjAmF2pVUvHzzKueJnFTv2AazLZ1fARfstmk45CT3HThN6x1A8yBQIyHh+9zJ9vz1yE

IIeJQcaTMFAy/6In3Ae+xITxIkPPNLNSK6feZdc3mQz6Mg+UMtmGXGAogeGGLZ+Lq6IYgwAG/iAReFMn

asuPI/qh7nEU9+sp3VbPVUzeWaYyvA9uazWeH3jVMD
dgYxPyS/cKqAHBhnYR7FNikE7FXVmmNjoHrja43wJCzLh5wcgSPAojVMzwnRCPN1aYdMS5d0PIiL07WX

KrxDUCowvLwbcRWjhBxJw7bQYTcJtURfm+K5oRU1tUP+CPnyjuIJ/cNKjnOWTgDXnwuxYwTyets7KEZO

Ok3hXLeTO4R25//mbx+Ea4eisLboGoAiLwZ8VoTSlh
Bpqsb0LA06Tjskkbm0HO2USJY8Wo3GqU40Id6LBCnZ01UeJXb57dc9Wvt1gZxRO3Hbu/Ntm2NcocVDuZ

a5tptmOFai/SpAN4hzuHC4q0yHW9fBzuKK9Jr9Qju9cNpXZsVhqb8zengd6RCsFQPtca16ZGhnMJJHzs

DeQxhGCDGGwyQD4lEaKXtcr5jkWCtGXTYa3mPe1DVg
2D9/CtGoDdK0YQsFja3zvmOhryq6OSSPEUz+iphRfCCYSte/VtPpR8f314T5m5roNI7J33OiP/d3fYm5

uQjMyq8l7LfcFGiKRfEOppBDZRK+ms6MsOHYXjwVOiYlz+K0Fq2zf7nRWhiaijVl9XFitrDwyRN2N/6f

7dxWdXu55MRS9e8i6WnV7hF4SHTjyu9YgL0Nyu7rSs
Gxvht2uljAoGHjDmlXOWIiPy2AGySfr465f3a6kO+M0e0y/+DLXulo1ZVuw+hwUey0dtkc2nf3D+3AF8

oIaF4QhSEn+kkDw1soiXIrh4dKZJ8HaoAyaQ8Eskwz0sh+HyJsohFdm7mOg3zdjmqzYiZ6jqdlu9m34n

2m139S1rUHqog8QxDidy1AjlCBtzqg85+/spsStW27
gbxmZQ9vT7L8SfE5I5BdTGhbOHS/hM/GtBuIDiDKEv61+eCgNtAm9uGpZPtWPs7Vgbs0ddh2ri5uJO9I

B5xQWB4g884BabXabRc9Ev3pgyghbhTbCloclw+B+iR5RYvh8fQZ4J6nvrBMz7LVMAopGSV7+Cx4TV6Z

ECb3++cF5BRMuFU12B7rMuUTx+nav7ehxfZXElrRAs
lx3KtcnF0LfmWfM4HFbjH66jE+QJMfdsJa9wuB2hm9IZLB4pf+smlXxTflir8ty1dIclWcInVp7cfgOz

OjnE224F3iSd0wxl1yhqtCF7vO/+q0VO27Wo55gTeY68a2st6+nFC+y0PEUNo20hypRV7FHh/KILO/rvDN

Zg2/iTpgZ0htnqqx1qEhPNfcCPVyP7O5fSAWHN6JkH
DsynRM8yybmMc0SGxj08wJufJAzU7HKCs+ODICZzeuGhM701XVB/aBOrirXnxqAyo+UzvZF3DxkQ/Fol

hM4bNkbyvplJnLy8lTR6Jtp/5lQys9HaI/+XbEP+62+0gNCtUIOB9D8jvvv4WdbmTMYyRmBi1wcGsY/u

i/ZlWPbCg9o/sdV5hupMtO3/9wR6+srgolQvonJazn
sFZM511EfCcVNvOAcCabzLjxU3s0iQN8UCF9Q7ptc64M3e0bHNaFyzDKo32oXXebjPa833sF7WtqLQWg

TgqIUiBxJbU+cRLrkzG6v1V6rMPGL51ExVsZj/699JqI/aHGWy2T+Q6xR9g6sxwAQIxbXyOl8OAub7bF

icDA1+m8wW5Os1evJ31AS1J2O5TDOQ6pHoS54DOHCI
ENf8fBxn/5lEkEJrf0fbgDvLRKVBfvTp3G9KRYQ/fwkySH2Bwo2vkp2FbFVEMp+6OBHbmeHRbJA6D4Qc

FA9XeTL5bvrMKPy5dWsAsjc7BW+YL50aOAt3U094+Idt/QtCOSySPv90dhd7z6ESFIbWAv1t9FlXUiKe

P7hPo1ELw8eWiMcUVaD6Hm6xd6I9lz5aYsSrNlOBv8
bTAbAiu5XMFNwBcCTAlH2d4lfBTAoGUKmV1zEMjMX5edJNRZu0MqkLGdEph+9Dj7Pu17uZ/WZO/b5L3u

RUx+hfRpMAPoubDC13Qt/g23EpAPgFZXnmwOkAnYsJV2cIFTjnEWKstZjszXLGCbgPmKBblGUmdgTGyQ

z4B93+vGUytpDtxBbq/6+CJRaY1oo3FaPez7WUMGM6
42OVGvFVxml5XfY8vdrOQfDMIInQnJnOPb2453JsZdIPxX/DR0k3cPY0UgR6yjD6p0zExNmyR2EfLor9

WjPQ372rUuM7kIGSx+ekG5ZbQfn8ZOoU6TKw7BAHb397Py51KjLTTqGO+YAkuXl6cg+iRil0F0VbHF0A

9O5H3TxkwtSzMbVqnatTVfRO0E/MjNF/nclsH4V5Io
kjAaTjtwJ2T0wYFYLUCmykBzOBpulkc1WvA54w8e9Qmkvwp+XZCIlw7hzgW5zlp9kFCjGEoArj6uCpF4

7OOQ3xBUysDHLmNIDeg73RxuXLooShkQS+xJfh2Lmp3JylELc8fUewgt9YQIoRUs6jAZ6cQnidbXJYbm

rJbtiOtwuNR4k73+uFUUAZp1esk6pFFLYV7+2rnEz+
e8ihe9uIJD5s7mdkdf5J3pBsE3M/fbEcpHKepxjzU1DdkK/C0x01lcr/iN52xQo6uZ+BREjKZ6nsGKnK

YVXNsw+kzsgpV+FS0ayiOlWmFyEOwy7TmitkCk6lumta1iLf0eJU/H+buKs/vSP/hpVGduD4DkiOoOuS

h4myyS8O8WTwGw/tRL18FpoqU92DMYYia5cB8lztcn
PTyeoCiZSI2ULXiO/BQfboK4wVVN/geoZp8b3QmjP3wNn/h8En7YHnKdcT0sdgsG3y2NYHh6gZmYdnDB

h8DBIvrTINXe7Gwd0BjOa9Uk/UlQT8GWBPOD6YXmofaPu8u0cAwcpZl2QmCuO8rP6LaYjdhWlIOljxx1

nihE98njeOedEmOUQOg74GDUoKWEkJy7t7ry4ZedEN
gBEhRzsy2XVlI7Fk+iMGxZO8ZCj23P6xCaq13JUh9rFeCSPblxiHQFV6AmVldBloAJnfpYtz8dzz5AM3

O9+So/n4g1A/mbC2nBA3CwAdal7EDi6LK9tKranoZGjrCudOUvt1sijmX4qNhgnx3WtttE9LNtZ7Zqoi

d4I+Vj7TCVUv0f71ZW91v2Fag6fbucHKFBzqY2Rbje
w9914FY1hci/b47pLP5zMwWRLT5+oMHqy07cVMV86CS4H3qjv8oikZREFZH++/NfadXx3U/4LY+DqIwy

b757z6nMzJ7AvRG+MjpnHm3bw4qZjM4gt0d8BCWVVVdrb2SJQTt+5FTTNhmSYB8f8sLYNcv6rSqTe6S8

7AwU3sXQOqIQ0/BX2ZGManf8gjS3vHnm893tQ9SYq1
XADDxjcgoqgnvs1hBI9Bbt4EK2PtDiBJSlwB8hWIzMaFb7gsEI7x+GePC9v1+d4/8JJvUvfajPeelUKf

dS70xDjHQZub1miMklCS2N/w1+leOSSTxkqx1WBUDA3Y/qoeAfewdeXEXEJF+KD9zHCX8xMpGdGbIH/a

czTYgg0lTGsIK23ssQGTrhMkuM+UKHeq6vcaNT/Miy
1pe6R0Ls6ndiVgzOBKfWL/G+SALEpR+eMcOVQghCNTwtaGMTa7E0wgW0RalwUsq8C2oRACpzs/PcAZBW

yVbEwoJeBDPmA9OVOSHa9JSJcCTqcq52/DJBmrxAP8rWQSGzsWaePfgN7uZMq0hOJrQPJ8iwdrtWoKF6

/xOGwe5/1/EwDRHm9yyOtjTRTgS2hjIncttTm7tc6a
67bUnB4CHN+Hf4qvY4/2492gpwZfwtmVZAtGIVzLMEhFY1AoYpJwYatHaSYxiiqLOX6F5MddEJNmjkdF

XELxHMKbQQ1CSBAiQEo++bORdnzsy5/F+di/r2z8251/Wl68r0rH/GkkSshQjoBpz5pqUBJkFDOCLsUL

na39UQ9/0vrv+d6doDV62TuDprWe6qgaD9n/76vn69
ms/GhcPirYOiFV2cPI2jMPQIxkjhFUKwyf82F1uLslkCZz85Aa38CyYgPVG+zH2ASHiGa15xD9h/yR9P

CI8ZkOSzKovgXQN9LIleXd8P/VTxFrzJPw/oRqKwDkSX1Ku0tocYubCw4yJ5E7GeDDCj6idYF5+52905

qS1pOtfY5f0FiOpFrcPQv6SAmaB0/pCr5C3TidfRZW
P3XlFusZXrDCFu2XxROZowgXyMwkOKr+MvSmrWqqENldjeGqOhEF8E2PA+iumvmqlE/7dYiYSZATpG8n

KyrG67ujO6EV5HIlE5rcCqzxKy+ZU9OBAEjV968d7wSroq1/00QzklUWdB7vuiFr4uTDRK84BdRWGQz9

7hofEuyiRXBJTB9mlvZO89u4//RIQ73hylcF/Hx199
yfLivxHl3oruwff8nOI5aPjgrEwUrJgSy77raHghChYy0IKbY4f7k9kV2L3RYLXOQ2/ZeKapaf4vQFnS

0qYwrVS36cw0WKpGzEwYwvZCHRsZiXVmq7u79ZoIKs+GHhGw2ciPvWmoKWTzNSaehTS+1DrmoK3p08L3

gppirp84dTBjQuLF/dRT4BRkStQHnE2oqZ0Uen94Op
QYegQb/xPuGxFUb4HQs7B6T6FfysbcpLWF0laNnJR0mpH5SxHD/usvibcrSVc5wlHja1z3IzKP/wrO+9

2fOndvk/udyo5odR8367w+xqZ6zCSv0xG7vR+sGfbX7oNE3dHnUbHIxdB/LJJ1wXDHA07TXJCzBlxOTw

TAzrznkvJY90Sro7TXgQTCNQUl1+pMCYh1Ykch7tI/
Hiw6q/VTDuuz9Uvhl2Izd/LXdFMvlmepgNq+0R6SbIppbBbkQGLvt5AXvE6A2+QQfOezMXgIv7MOE7uA

qpOAFH+dGq22ZyiilXuqtJfnZbgkOBLjUsjY8lChIU60nud6mMUagjIz+czCp+m74OyebQ1ad+qzY1i3

kPFDFpU7z0YXQ3RfUxTrtqCY/NAnZZ3dciETJOJlEy
/DhZAHVbFm1Uyks7FI0hDanpM2BVZclWQ+VHQqe+qsloKO/rzVbxNqtUNqBpoeRdqlmaYNtr7Y0AcJXi

tScwo1Yw+AFkmJZ3fUAC2dgFxoaW3SMaqQzq8K3qrcD6+1/PUvwpuHENvWK7p40+bcusEK4u/YwOXlnb

A5PaHs90N/pEXmbtO1hISe3U58oezwbfqKMlve5UXT
Jmx1fIvVHFQxj3JywYSWQjYEMHdUG/zXm3OLP05SdjEcMYKKVyq8C0HqdgzvFjvlS6lhUDBuZAiUNkOa

w6tTNQxmpUZjOZ+7CK51Fuv4MvphmeX5v/l1Nku6A92xDbvV9nLVURGeTY7Djw/U/xTEbog85QoJFHQ5

y6DiZKIKOPGHLU7jYC/1GJ+lAgB+Ao0gtxCmVQbT61
jsimDfLvpF7YZ3ShYFuNZBSyuubNxmv9tdZ9zYEXLpmXjBEPNKzynmYHgZNSWUeSEW+rRte0yOrWQbJQ

VH9aV/yt8NbPohdq28PXtkDPd9Kjn1zkYz2LXUXB9+Kd4p4T0z3HgzqCe+P9FSykiWRThFms1IIQnIC3

ZhvafSWt+0cVfLGb90QrUAkBQEFiDeG4+1EkmLRsR4
MokmKkSBi9BZ2zbAu7R2smPrVdxIzIbm7zciBRAfGMPKFgGwF90QkugrXlM84W4NnKL0PaTEeen8/GoB

+14wyd4WqTyZ5Zlh0VZ4f2aRX+NYDgG1MITlqW/1BEyuJO2nxbq6VIh1SqSwLdQTE7W4cVEdldQRPoVp

fBS0DW6l/L6aIWLb9QcVfbgvQ/S2nL0hiw7IVnTnyS
NbUxv+QBsjlYURQtAaqk+Q9UVsyBVxHg4i/kfYTV8yMAfmEjqbg5GpoPZOGEhq4rq3Zi9Fh3JocFF2/f

A47M4ZYj+mgRLN6ienOGNAJv5n4vA6mx+FUwRlhtUea9DCPy8i+vxc1Zp05EsctZ/Nfc/okyGl8Ehhrr

AHxb9/PqzoOVlEFn4okJzA9rFMceJVq+gEab4xX2Xu
o9RAagG6iFpqaU3cgOgq2/MmYIK6gz2DavNM1tV4SuoFbaBtS8pV/9Fqpf2zJHdkVgLGH4chavqWRbHM

ThCSQtpLY456kArXDCs8C6QwE0o6vTrYz2YiFixEXk6T0CRmxXmir8R+rJ6Z/zb+y/aTd51X8Aq/awmR

Tv3CV+nGgGB//Ang+9LFlUmGBQFNqLnmDP0q0zr+ll
ZtUkn1+S6Go5P4sjC1IlwGhjdZ88FTsIL4rH9Fw+26/vH2+d+Wng1cf97YYPLsw8t++d+TkLwEnAnjsP

/JkO4JL3OdrKkSwNX5YCCI1IGN76znHlUQk0Ru15T46Mu3nxgT/M6F4fwB7u8nzaa0QE2PpslDM4TxYh

wS80R/vmwBL18yTw0Yjea/NYH/tKnP//aek+iF8ZNz
koKRVq9qos4HooD+3EhZndmSOYLGp7W3NEUK9HsOQIrjhXHZo6t0Gb6Z2U3BDnn9uSkcZFCjp0L4kTpu

rFwAk5Z/g7yOKmzHCIv+5Gi+ohyGZYIIzb2tXIAVzwmDT65g54wmo4DqXjYTVkVaxuAwu/JP8ijuHOQ/

/bfDeQeq0NOjBQrQrULJbJAZ59pzZ/RkKj1uoYIdvL
6yBhZ83lUZXMTWG9tD6uF+M+pm2JoFDZoTIp+3Lh5099Ly65qlkYb3D7IRWg8sWfe3+maO5aiK+oRoh0

XQNVWy/ZZe9rXV7sXzCa/bCCHzPRXTbBYFcjMdRNX+nnoXa1so9ljdzWBj+ASvhzHsEf7f4uX3p19Brl

jeXUh62w4y5nW1Qg5gWAmGaa3mOX6vKvU4xwrVAPG5
EtZN/tkUF84211dybFX2ObM65siUOEQ7C5m1O6RpXvHU81OhKH8/k1KgzhPTFma+rZlD6o6ouPYFiAT6

DOB67vLs+NxCMPW+uMZ+P7CakolgG54ykeTNRnTfp5CUgm4vwSXfc6zvvjdIEXmK76k934fVYF9u/D3a

SkI96O0SZaoHCqVUMw4rAR8VStZK/pncaY3ozpo8fN
MZn7bGgO3nr76D61c+MO4194YrlD4XAQboc1SuZvqxEoPTvw2qIrY/gr0syN5gx3t3bx0qHd67/jP9Gh

tUIWXN+4g4ysauEvFQ/UCejBK9FkNq6nI6+gHDNZY/kLGWiOeTU1w90+UUZqk6ZSnP79SkM/Xfd2syfD

G9GE7+ojQfg3A2Hqm5SzM1yoG8sjmw1R5JsUHR1pCe
esib9vHFWrlhjb3XUzOBT2updrbbx0ILQWPfHUvIOt0f3TGPfJih3/LBWEZLGpC3tHI5IDvybNXgTQZH

dIvDZt3/nN59K2QxIf5yWRrtmbEubG2ijyXGVUx05Hou7OdbS+Ra16g1WhlfHnuK8GZmtw/V+Eo3fcqV

m85cPjD+IVyj6z6E+cTVJQcd0cXi+PberTXel61LBP
cHI27ATdCDH/yuZ3C5NiM4vsBmsDh8E3+Gk/VKmbmq/QqZT+DAL5VdT2fS84MUijAh+/oCDH0GR5Gsla

NuJHOnlQBbUAXu+Bb1njCiDXIi+w/BHDri1vVUCAkOpu3ceXOi7AbZ11K0rqa8a81xOLcXRAHKrKWyLA

qTZBCHOT+/Mfo5bzvlDStgVyN+0aCcg5GWEa6a4Uke
QBwgTIYO14TOHRB0eyCHKEbirj4iHzue4Y+nJAHUlkWWxjHALX8puu/nCK3yAFc6czsvIzPlxosNi2Am

+TxHTylbH5i41vfOuFfhwWF3gM2sH2dnmrnrKyOMNToGBbxcAlEMFcfgvifh7U/hTRyPA+hAn8medR7V

XXCZO5+z1VA8W2xxgZQIbhd9sFzDM0jYkr3RjBzwF2
RUKSGfkIQbJrmWpT/OI9u5s2cL+zZf5/QPJWjTjUprmUyQwS6xOH48rm1wtHDXlEVf/CIhP7sFUMeDa1

jb0/kTYijdivo7b74/8tpXUw1k6FIdg8A/jDUgVJElx9b2URQJaJx7/nq2Jk2Fd7KjedyKl1+hNo3Oh+

LkLdaA7vqF6PQKCk7+nl8LrGz+9Lry6rOmTDh66FZ4
Q6ziwW0aGet9B/Pi1D0w3obqzrE8RbtANyz4zaGGxuHMsCl/wD2xthQ52zrMeQFohv2v+WNlmaRn7W9J

aG//6NmK8pbErsHHrzykae93zCsDXy150HyXYCi+zcKwH/ZKpyKAI2Axnewqg+n+xYNJ3+/oQtfJzLfv

Gna3aKkf/JzU1mSQBnI2rzs8/0fJsUmrq4Jt5F5+6R
f0vqPzKi6EHmFIjptr5bZcrELcIuKVr4DxD8cIOYkfVkh20RPYrjjppBFnruwSxzxGsXPtx4BkOZ6SPl

849z2T5XRURmc3qy6+BBbiKMDE0yF/V6hOvXRKVajdsjZF9OBt08XJ4YBsCRxIaxJFzdXj3rW7YmbwYn

CQkj8a06hbzAGGk8OAsw6IjAcYwBhRbfF/uwSIkWGd
C1ooghdWkxkjFqE50Ekox0F4KuxMh43NQUPq5YGqnk7DpBcfwwb9CQ5/12Zq7MdCr0Id5580TNbKuw3w

LGt9vq24CP2EvfPrvQi7rOYWG/83PaRpsSWoEymO40DvzI5yVExcJBoJxfLGl30/13YicnZdpSrcO5Cy

W+1QCLwqzRP8k+A3VIC81biWeym9qGnId0FkHy7N4M
OLvAt9WPnFZKzkQuNBH5bEJdaXpl2i1+rToWOjyR7yaKhrxvwl/bWd6pNMPwzN3WjRcWxwTrAdV+zJur

q2cE2+rUlQdwC78JQT13oWC3m2agXkQ/6HRhWBoD1Tut/zq6DAGbNFMLWLE+GeQ2Dexkdzc9m4kP6yVN

3TLHO544zoyU1eYqtw5MZ8CayuDD4rbtnXV3PIZ2y3
K9lfZh6QcBKBTl438WvRsA0fpc6MBpea8+m7jBSjliezErsK2ukfzmQ6+uSE26HYddHOV27k3ekmn26z

AJ1X0BuMVz2R9g/o3vwFy+hcvE2E8tQhFu9+zLy2S+CrBiTF6saJIbkFaW0mcLQe8rzOKQ6tfkpDyM0T

aNxOCa3HRIgrFB4ofNZ/t8nOEjQ//BwJnSv4zq+NGP
vTWZ9iSjPhnbtyCjtg24zKmiSOieLxH6Sin2w/WOPGs1x6zmr5foEF2MuA/TupQsFw5FhMZ6PA6X2ZAZ

hE/vxPnNRJ/skvm+oZs8W4C0xo7DBjbY8Ft9c1FJOOuzTkWQVBhWAgh5CqyqV6U5Uv0t2CusCVMgvbLF

hfMhp7/hCwN9P430ep+r6M2TqiYPaPIHXF5rDkbat4
KDeMDrezxm0K+HRE+iwkrgZFaNf3f2+ffMlJx3qw1xcZNf2JiSRDewAlIrLE+m57+1HOSFxLuXa5CtNc

1et5SMTCJuLHhYmwyQeXTvtQDbqTyPRFk8nExL+5TXt/v8B4mA+uOuUwpUtta12dwdU0G7USJADwYdfh

SiTU242ff+W/bAibZGAZbtK19uJ3Fe1tIYcq8PHUPT
ikfvT+N6S6jqE0DO/Fc99xi5t0V7w9xFgqDfWVH1AGusJ3qAzftwgvZWZB1p3Tj2LHI+WFDeUoCLbwEu

U022SOtuk8xdfvPZErqnuzcL4OmKuAYERx7motPvQU20HfHJHgACp8Gj4MzH+wdBrZeX6pKnATHiK9L5

ejQ+vGI9vqwUYnJ9McSVi7/OKB9G2mlrptXqkXTedc
WpNGYRQvR9ah1bKmh7BYQZWViU+K9cfuIZT80ePyybyhQ8+k1/C5uqEW1cSyP2JsPbh2PqnxnKxLne3+

piLf1qSTj4oBo6nP5hFj5w/z53g3TRfgeMDUhnYzvj1+Bqp/C+mODVivd4iU4iah/mYddq0A5r519DoU

Z/0LGQ6zWQrIdHrSCtvxkH2dUwUd/z58KBNrfzobbi
fyNHkbnB+jjxS+ZHXYm0wT2c3abV38pEWRe9vODcJoSBBo8KSGVqfNNgL7rVayZFj3FDdp//Aapbk6jP

Hmo0haZYq1u7aBv+B3tze/0I6pBZQbnsR7zgLOdbmUnFAQXnJo/D1InXPXxMR2TguvYY6fvoPtJAoCK3

ebtQupxAaMCsE6SsarQ6kmvbfyRHdDqNvHlGLBv5Cj
ZHAm/+frI6o6S20pvfEuumBZY5kb7wYGSZgHBEPlEHADxmIaFpfBXp4HN+APj+XesApqT7U5R2jQB0CU

ac9YQfykL6/meOHJrm41fgZV8Ln5TZpApp2Fr42Ss5ld9eo/ojPYwnmQTBhpnJGQp4wpaMim6rQPdt1Y

YbIVU1jZuf8QJ5VcZBkj7DK+Qxl/QifBNDscLcBwKu
gc41RKm2eqcVpB7KG3dFEA/fvItY0oGqc0q/2MehMvWhOb2jA739ZgByluB86SPHuUdCbq757OBMaY1h

tHBC4UKqaWk08aPjy5F7kn4ur2386r96GOtUgwg7b/yBFFh65lpJvkWaRICxXcO1ghPgUMiLsqbq3FFR

lbLHe6JhI/d97q0OTzx0qVmCgRvn/T61GNbhCqEQxR
dQi8JFIujdjkSlD7ShMyl1PBJlKkOt4bkUMlutOiiCWHE4IEnhAWj8HWHXZLWeHjZUuoWtKb2F6GR6kP

dJZcAwReLcaGaOGH4wTxSGcQq/dQK74shjT7BH577pZDjL/9IOV+v1cQMHCmV/957UjCsKCIL8dAWP2b

Aj4CPQUQDSQEIA1BZ0KdL5cxc8kwterQcUCvhOW0lJ
R9wJ28rgyFpGK1CqGUXcL1ADzrVnPqx9IA68+QOGB0KM+/hoepm5mr0wcPKbFnN0z0YXgt5N3ynGHxTW

CZK906gwLG3Fv73nG/Mor7I6BqmKMltGPkv6QvQ7HieyCMHZdjrnK/lq2vXWabUya6zjl7AcJpye9lgX

cv1EPaOpyfwfLcy3gnPRqoKERfYB8mEOUGftmYgkzP
DT4TUVaXFO+BCXdJjjW14my8Ml8UcbpGSJHH4NEaLT7SnQTnoTj0orMGQxZiaYvJ4CT1/BAzOR6kr+0X

E4d2qios4ot3RK6W4xqvQdIOcxJGyFxC+ISzHaNocnJAleJGc2VL2EvGuOH1wQy2lYdBsdJC362Los7H

G6VLB3UCtbDATqiNaRU2zuZXNIn16pmvkEItYBxFi1
saVjIQvLNobgYL4v3elzc8Kq25pJVK47LW13/jXF4h9mjW/sKxEkO7iJRz2UzDCRl8L/9e8NGC0cqngz

8ddNoYYlM01U+KR04mPXuiwRPiAw7iKC2Udlwe+pHTm0mt4lb9jNM33wV7hwBpBcgY3GLuwz9qCi8AjK

qsYKh+Sbt8RZe2MeXJ/WK1lRT5Stggam5zfW2fk+On
XVM5gddGPav3C9VnW1n5boLmx3QRHrx8FZYaC3a+5WjvjpOBFq/yKzxS2mexx16QqVw5CTHZ3NcQa1hg

w/2ZHU/Os4xRdbeBD+2Y0VRfHdK/6bEn82Zq2eWtgPV2XNZSqqICNGTRRW02mTIRrVcqOe1HvxCauMK/

ehWwUexgKfoBey79DcqVQH53KS1cVs4aLSSFWYImnm
GRsoNO+ryPuBxC2dY6/VuKMsbGFwNoz1CT9dVkKdy95GP1wcAkO7fnEYcvBBgMnjWaY+9gg3KThQr8gz

+y3+y3mAsE6v/BemNZPM5EYiwhmNP9lQU+qGF2yTCB++u/zogri0Jrh5wMxTr+vR2LDIMZ4Rj1yfQGdC

5eevyXR8svt03PNS3Ag/v9qTN89OzASs7Ps8SVODTe
NRJglbXbnchZ1tftq4+wQKESsfZ+a7uBZq/7LcejE+5OD0b7VUAYNPEle/W8xdrN6V0Xj4kvb7sBW5a4

UHQXGDwkU5RUWsReaxC+1bTRTmh68QrFkX1NbbN1ME1zYpyYv2tcKiecT/FrqY5567b9OyyxSWDZemK1

Y4zEhvNe+soeCoAgSPgCORlejDdBihGZN6Pad8X+PT
OW2iAc4KnhXzgtWOnimATLpxHfP0LkkT6cQEEkDdKgF8plCW9libG0kts4+dllowE3VMfWkx5VAin5b9

sdJtjkV76KC3Eh63diN96I/hHhqki8H09yU8Tw6j8EGR/n3UmwiUpC0rZVdPL20H5wgoEOF3boAc8Y/d

cj20sQ1FiM28JIUXI40suehq4sTZ96voIrXszgolbA
AVmtbp5swJiOwQ4SJLXkyYLAoQtk18ndFyWHsAqBB5/pjPMZ+ho4ZimXrVQOFBe7riyr9Ib8OaBlCgxt

Me6ctKpPj7OmpsHpYpGRrniWRITabhukChBVSfiA6UP2GhUV32HgJlDqUGWEix/4sQDqNc1WVJuI1Mwj

BeQMql1aF8vGsbBGfP+4CGM6yXMEmLflbkGRqps06Q
MnvfDlU6OFHKIbQ+UaGOL8zK97r8tLekQWokffguCj68yKsdp29hg74jYpukJiqx6qxTpfhZACJI1hfo

mXnLh/GpodghIOJ5ZIRA4AJUKbiCqw1HjtakAasssAOXl+Bstz4NbS6ibPwReRK0WUwjpi8tdS1kgqxZ

PEKtdqV76aljGd3pBFk8Zo72gXTnNZbN3enHRCGvuI
RlRN9YRpRX0qsozgPpJyZQoNVwd4K2Qy1fYBl0H1nzyHBKg+QpEyk6ZnsLRb+I6PUw67PrqpKYRaL7TY

/nNwkRU/9hiqjYCq+4x9d/cjVH+vx89oLcdaTdMcNnH/1P8HUyHMBajqpiDPVMgzbGRmhIFTjmrMOkmv

X/VZljVqliONBBYs18VmdL+DOc7Nf/8sxggM8H9BMQ
fdtjfsnM++y/Kg1ygYTmt6t8LRsoBmu7dV3oC84c+mB3nt/+H8lfopSUFcn9kpAGDd/4yuXmMDc4lqbX

Y/O2np0Z4TQ85PR6J1IELTG558JwJjSPycGdrU8pS/jsUXg2z/OD4g4Pggqb71sUxWGl/sLPrk2dOMVY

P+KU1NoJC7G/+nbHka9J3zp0xxy5NQOeOE5NsyK0Sg
gHAzpezUvqvUR9JRYHc9r+vdsUbZ9PwWhJl5kt+ex8QEB2EC8h60eRqrflm4IW8+9F5njjw8CJB3TVKF

3yryACiYr2GlIuQhai6NNFdj276mc9s6/Kqe9a/+v3flsytYvdoZJ91yUv1cRNqUZ/WJsuf/lpjzZFzj

NFh2G/UocIYoqdjicZtAaPlNFln12Zstws/QUjHEVF
TkP2k9ZHXwkJEiztkeE1p2ojxeXcAHt8ACaE6F4Iuqqzit6oonOnEX5xDFy5RoflLwhDCQOWBejtOwDU

SuX3UrVnrYGFUXYovh8kmD2vvBfhlymFRNVeSCe9xY/mLAzRp5fwbGHU8aG+DCuCV0LUzS0RwOpk+ySB

3vA6i1FqInOCEn3lGOOQ3sniJWT7VmakBECRa64DKo
jfL6WZ9T/xbV6Y1bDy91r/zWhMDPzEr3/f9Y8OPR0e4YvCBbF7/rDuTHmOb98h8ZFD0za+c6gzgWQrsP

3gbg6N9P5wNlqsXAwtYTTFgC267iOYgxiADpBiL1xwpL56PJSej1HKyJWr7QXnYx57fQ9yy+yq49bI2O

7EQA3QUfMTpfJzQYW/DBgfIVfHHkwFPSedZQ0Gmbzq
rkCJw/AwQg8KCpGUy24WASFd7rhqsgoWPDwWMvArNfbjLbg2SFIBTprItNK1+bpYuYNpOtArXBVrgey8

mZtveQJTz0cPFvWd5KK13Mpg/7KcPgoae3MuYeBmSOZJC0fcDP24z+FMVwO0Iw5lecak+03nLikLf1vX

lbBXm8W2tvnJAuZukRJTRddZbPMDfS/zBNo74jszLf
eXwUeh97z2nh8FZ/cqjweaPiumsRRjF5WBig6wsjz494lh09gl7yl16rI457wTDX6ECzVGapSx34H/Fp

QzGCuIrZK70KJuYQVj0yKpLbQ1tfJss/aG/ujJBuMzX5/tKKqdxOCuJFNFwzkxk0vpnDEL7q4V8oaSwn

XqyyG4LbvZZ2AwPCml2brhw6d3MX2kreoLZbRvpNoj
u004EtAz3myevnEiXISx2qELbSeBibj1gwSrcDyYS5WCONjVgVZpSPSlRDHTaN5bPZqGyFSxeAU4pvPX

PgOHmMPqb4b+ePmyC2mrk8arAaX/rsuprIo6eYrp20HtfFVRtEWHOhftJ1Gmq3pPrcWbJ/xNXJmbAyFM

2GvHllk7SiHf6zT+S322z3R3/ise/3US+uOOorgTGq
pTVoRni+r/us3S8LwPuprxKB9pPEMohIaPfsEzlVdSzTyVU5AtRjoH8aH462fKcsIs+IUILu6TjothR4

YwLXp5YdxA10ppgxPuLFCfZWTiy3VO90jeLFMNYIqHpUEH7fchjVVnjDtgbtGo6N92Hy7ADRpRdCBd51

eyXasIN2lSIjfLfK3YrsvQmF2bvOgQ3UMkmY9z9aUu
+Db8Vnb9Wu4HX4cDTlngaDAa1ZBTgqUHLJy+OKrJw3wPvIIdS/lClnkFEX/NsX9+hbE3N5gJBSk5+bP3

2ytzfbbZYfOjHGOnpbp9fof7w6uiAUPnNHVM0hwenBy9q8gDV55bt3GQ1NgH2DqzMAQM6y7TLVFg/opi

94O5cbiy/5d1qK2n1utI0RuOfDAFHPZUmZwA3syWel
ICjmdHFrau3LUl00e9ALeBjuHR7gBoej5SCVt1BfMY4HUAnIHe/Loq9djldRaPGo2GKrL6HL9sXz9Uzh

Y1H57De6UZRcz0ionlRuvpnda3JYnEeiLe14yj6f9nCUChwWHHkj9m0RX+NJOV6ZLsMoyj0yj0ZjG4Pj

SQGhZOvXFzANA2eCbUm1i36yVepNHhpdbZ3neQnxTg
vOsQ0oLEZ3MX3bWA7djzeTsI796koAlDeFcHO4r1DwjJcVoEXP/hN5Dq/g3WGaJmMTJItuzvJ3p1v7XI

hwAXKqcqa6w9kIAa3oZLi5v53vrSUxy39I0FqMa42HOWi/jxgarqeBQ58s99YIlqOAeyoYo1I3gSroSI

1KdfktW9Fju9xd6BTGcdgPKUd99qrmv47IN9oF+r5/
ElZrplcfgv30k+ZrVrIKHeHkhMsiiubJvNUvxSGV2SdhlKnTnEb3Qe3eStVPnKunz0LUzJjZ/1nAkaUu

kxywqgnUsftz3+Q9qQq8nBgt24aPdcE5qOdqsigTW1sFom+Bx+58045pALIz5Sp0Tf4hkmjvSJBjRV3V

ITEVRJzM232l4L+eXBpDEdJQpx8CCr1A1Uq3cgdUSI
vxPh2RR3uTTnQUGHSFjSX0t7KXTUMtVMuYlcuvCqsnB+LPei/m9n0jvsAwmqqPHuTptL0G4A00e129pK

pHyYhns+RyqpNxVah10enOeV24jhABdYsWH8QGL2Vx1ttSEoSJDcm1Zhf08a+tXCCEeWsvBfumYZ6i8s

dOjmIsZARthBcDl4rDcC6gM1pjrYJx4hQ0uRkAtg1G
iNFBNfjH7sVbzMV5QBSip3fmCIPwWBor7VhfuMg2zzc4dasZqU93aO05XAe1aKpFbBO6Kqqy+dsVTio8

S0TFEKFi6Qc/2+01AZESfIdhLeVe519WC1S7MIZD5jQZUsG2IFPDtPOR7sfSZ7Ne0xs/oiPROH8gwgOy

2/oEMZvziuIlyS5S52eSxHCYqMwse4NbT0Qz6W7y5Y
Rr0uDntVFAb1/x8iagM3KmN7sXDBS1kBjHrNL7aMXwTMII/K1AkJIHaad3d5gtV9P1Gaa2mGxey0ZiIn

ASU/U2Ax5QRl0SJfvnA88wnNuwTK5wo474+SCY2LabnBppTqu+E93EZlNEmT6lEeMdQmPoe/6GB8mBn1

QbXJULk/hhpFT+Unkv0N2wBiutL/54/FQsRZDgf0Si
yqdpcybCGCHk5uSp0OTwWU+8sf+A5m9VArccYIgJiVFaAngMtXmZiabDnpXOkbAnFwUDtKT2Y3PcK6e/

g84l4EaA3laUkelK8an/A2LSnE3n5RNp2Zlg+LkDp7g70nXQ++fEkEHNxG9a5P1Bvp/50YFakyveq4oP

tpvE7htPqeXmsp7j+BUhxzrq0I74W76E+V2g2HZBno
/bq8GWNVnYAxt6DLAbLJ+P8UG/U6XmByF0ZBs0B/KiYPAbx0CAvs8hpLMtX6hteNPGthtNZxCjckx2VI

eJm4Iig2A8FnCITcluf9aaoH5+Xhj7urRAiPWry8e0stOpNA6TY1Q9y9CCeo3O4fNHY7N0Fq+LrM80eZ

xM08xwWwldNaPDOuZuP/3+e6ty/WHyx0+fmNDLE/rj
0NvXq6DZwX4uXnneURIpL11nST/5+QlVB/JaNgW6DLPjHoXzOlIKJ66MrF2GKEZoH5lA++aOnNMX0og6

6TASUpQZbxvmVwjpy8+yvl/cTbK83U8V2G29bRiH5i5iODorwtpPPFUMAznm6Yt78OBl+s6VZKpxGfdt

aJZtFBeQjjEUBg66ZbvtA+pSQeXGM7FFVkHBs1Ub/p
FrJNlMgaR6k7Bxq76ZH3ol3BuoC5ZpjtE6Cm9P01laJVWcKTWVeuNUIv0fKOG3VyRXXnjNjZu/BPSMtB

PjBYEx8hhrFsdt/CUND/Qh3wdbZE2dRyZSdU2Y3pOU9hM5AFsIm+LdwV9ASnXUtrel1bxMbf5a/XjMMr

n5ZdlIsXe8XVkLp/eFwNxtKmYrXHlJlU91fnvHd0YC
H6QXRA65wpBlmUvlgDYxJnbW10BKabba4Z6wScdVUyUdq8sf0lAGJzI7Qram16B/reXReJtwAvG/B+KA

j7zLTV+lLMYkQUwtUu6+ygxbY4o7mYBOrwL9AILbcL7P1kpo6mpa4+4RX/zjpbHjcPsiP3sXmCnxYWDB

edZNIMvnzujgB+VgCXFFtNi6/4nMGUCTSe1EwNkHFh
I/+3Cq3Z5e4WT+4pDHNeN0XHnPAFMkaUJajOw3wQA3rpPKTBiUNCgi/BaxmPCqn2Zu7Ao3uVzIVJBoV1

uSbbAddk+9/Hy8g72WMXmRjDYt/b9mBo7uPtItY17h2aUzBj4s2DjVPQtI3RfEz4RGeaV7AtmoNQn2tK

wS1IAeKtCWZqPmJOF25ZiyyxQHjQQrEwg9pVqG3hCd
/DLu1MojfDwChu4+8R4ZGnDkC9MuSP0WdZijCKps6fJSklU+wvoVBVy13A85v+VENRd3rBoWRRpSRP1u

GDG1QY1YmKsM503bs7+Gzwfr1CN5bwupnol3ilueMqf2+WXUbB/Jx9JeOQCt4JIuHFt/LKhhgPKxJP4m

FFugNlP8UOty5xEz0TYzCIo2g7lu+mJ3QS9VBevCYN
7ku3JETxfhyKRN0vEyaGeEmvZgNbVsW7r2/+kxN3dwmeOYo2Dx68B+/dbWV6IROd9lamz6vDnxo3Gpar

2/d9vgJXcDiq3Fp2cpUct6BUnRc7Eyi+NmWt0xRKgYOzrfrYnz7ThMQbmO6UroZc9t5irrliSEo58Zrw

woB6xLb3EDUjR/48CYtyyS43xIMHdJ6dvKH+veAqv3
ClFPpT2DYIZC8QpzN9eU7y26AFb1Kfqw/RnLFwUDbLNblhK078KHRkhXE+5g5jSAzxz2aMbO6mraWbzS

lfG/k4xVzD5+39uvG7w1aqniHykSR72A9g9nlqcMjg8+hWpClzZsnQhwWpfxVXJrbaqB7k9biDg9vfm7

3iLvuKEi1pSI0z4F6o72hfLClIn+p3IiibkopPNWR2
xQx/pgwslh55mL35p0y+JKbpY1fJA+Ajqg2NA/B8hZEhFViE3T+Mx+oiP5a6nphKxn34cuceQrVcfDcQ

VsWCDZePEd/6+q5dbp+n59rjgw9YtOitfQYPz/fmV49Q0UT26vhmvlAUwV0i6PRQTUvn1EEgZAqwj3X0

nIOB0oW54dGlAFt63KTfKYy8dCGSRXXlVhpLpGAcHR
Qco5+mkw+DzkYqPg6RmoA32F2/MDxxMFMUWW2sQKN2XoeJqeL/4aXKw2E1NfdWsXS2G1grWnI1iiA9cw

EmCdc5ge3LuO6m78RhRf5KjYY9PwVm7f1UCCkELmbfnA2tRa3wWpEn17ULAj0PSCFD382aU2PIj8UAod

I7X3+i1a+PYJXPtJVO/CoyUbHvGED9upZ34h0EVEhO
3jSjmgGSNDUnQ8iMIPC8D7k+1TM7NjMbCvzo7mTRZ1jtRxJWGUvdCJpcfDQPmrC8U2XvwUttK3ADq41R

Ljp82E60cVGd3YcWt0RElXF4ugBFP08iIEIH9pOMn2ulj5HcD44O7fMhT27Hr7PX2b9sQ+vucgRd4Cab

wsJNrJTMo7ogmNc8G+8MShi9djAd9Yzh2pkfFEeBO3
5hhUcs/m4U5Ieuj+7miILyjt/VI0Cz/lmQ/7tFNM8pMt98YLGC+LgHn57tK2sX6sRLWWXxd9JUKuDWYw

RDZNnIzA3AmAwFabYXzzja3ZA3kOJ9p18YocwU796ziVnCK9bDEy7up+2pS+Uf6p9uaeo/CKwl3/zMUH

PmgwohHdqqgcNZGHX4YE0V07U2BDX/lrYX9m+1+fxd
do/iTR+r7R6/cS2rdSpQoRpMb73H6fvZjn4H4Q7wlB5CJ8VAo1jSk9Rvgz8LAHBB6ng72L2BAA6BfZbw

N/hmnAzd+tm+bX5OuRPmWhRqRIMuxFc4dBRf7BrOf5Uxqc5DKK+B37c6zBulq+u/TuXaqUOoANKHwHU2

QXJVnh6O3f2IpAq4ZnxWqLntMH6aUXtov8oiVXzqJz
h5p315CFrgRavuuRAKI+GHoBeNV20v/NAlVgNwrFB2gMCDAryDsVgMHaL7DH9pv3YxOP3FuAf7Ir/MhS

nyODWnbreBRPSDXM/sOl944VyBaPFppoMq4n8MRjLjz/jtDemsGwei8SczGK4sn59aInqHRiJ3fXtkYR

yqN4Lqi6qGnUO6WgO6PkS3RmrunhWW6txqfWqH0AOs
ABZ00f9HkmdNzBjlDmuPw5XGGEQpEi1GBS6FlXP4bFFZfzB91HBvFfEh5XZievvtLFa54QDurnxMsB3d

sqyaYPgMwhtu5HZmQ2QBG0ABtRhExU2ngaC4y1AjDHK6c2ecv1HictvSIyLZau822n/+5OIuR1pHaQOC

475r602dFMBRe8nEglyY1bWqFwWfFyw+Ww8Hq7J5LZ
iGyNOVGMmv13A8dhUa5Q/WeKwKBhrX3qiMrpiOPLQLhwK8xcjwNeBqP0D2bL6tu4CwVESywSzVto6dip

/U72WiAmJ45WLPlmWctrtArjIkQYO3AX1oMW/fNzGvtI9oCRB5f2OkQzCrkNfsbgGeeHyHeMmiT+v3aj

GnnqE0eJUk65j+gvYUD2KM7eT5juwLlP5yDvE8QeMm
0TOMTwZfE1uPhNBp9AkFoLrzkYqeEP2D5gDhbVCeXDCpxaphQW4y2f8nhUJZrnq39XptBOg+U+1mXOi7

dAMut2MyqgAvgCtiKSiimwyGNMUzV4Hy1umrjaXkmvVJ55Fdazb4GG47eCgS/IINcQnDdtK48jr97HtT

QSHIWep9oHBD+6jhbsv1yKKPHht4gYzCi7aA1tCJxV
amUGt7VM3uKUyCVWmIpqIJ7Xqoxsqo2wvtsdpNkSPtT1Iq9Gx2ngyMCWRV3nXr5zTB/g5nAnZ8p4HB0H

3LJSX4ngVVdlc0l8FIxK+Be9obcYPcBxxSqbxrJ3S1CJNjE17/ilWkixe44PnmahCutRTvPKgJKNlyfT

go9fM7bTjYY+7c3q4B+S3skaUNRc/BK+cm9XYz3+kG
r7Cpa0VTVSmK5WsTbYZxUfniHElwzrCJZ7kEkLzW20xnIw6HYS3awDZRSNSd0zHtbO9yqJPYqRG48Nbq

sbf95Rv0WsPu3GdJlSFZU3j+L9X2fzEYMiphbUrbjGYdyR0sypRF7cV7gc2/TwflYeZzeVxb24vfuRCS

9Un5y3nO8KSI7f4rxAIOxAZZXNpFJ/kOcM2DFcM2LG
+VSRMzlyoK0vPrhTNMFfa98/wzhqQhDh85QWjwoXNzAUOq1l1Az0/qujyDsCCKLNmxueJyzxpSJ8G8+6

QwTStvYPDIxfaYkSdh5bxZCvtWTIBy/py0pbVhn+pG9VYWX8eonBCxlnfxdnSeG9OWAdU5KANA6n0ckK

zmv3m6Nwap3YR+BfgRpoEGEeU9KmbuKc98gm4a9DMw
igmW25uhZRF/mUfT38R1L4mC97vRXt+xBM9OhGJ32VZ9QLMZwM/9KN1RK4fdi50Wkj353fPK/2SVDynV

flocI5aJ2Y4H4bsVJaPorTeM1aXF21KPrpUXWV+Wf7EAsT0xEgrS2b0bsBw//EYpx13sDeyvcV/GKcG9

yMMtIIde/454y3FH0lT5f6d1Z+rdfrc0SG/UAHPSKM
zhkPlskx+LH+41fi7DxR8u5SFgE0W3ENCXyh8FPlz1mgnNMqS4WzzUCckn+PDR7U/OV/ORuIcw6yJyf6

WBHAiuEDyYEZ7OQnN7OWf8bMT5jyUDp4eJCW8vJamKbDwE8zLQA667CBD1TcnoxHFwjh8QOCmcFZnrOp

/li+TUQ8pUSW6+OIpusO5LKz/EfpTUhzjkTIssPT36
2I24olTCRW5uYx5jpIM21tLM8mL34hoPAkFy6nI3j6e4mkq6aH0GB9JsIVU5RkcZa2OXNPYw3Z4f6uev

FpJ7OiwZInBkAnlqoG/P9gzDxrqcARoBYyenRBvvHUc+o9AC0c9hCsGXFr7nFoDzEZ4XygNL0m+iv0O3

MUAqnC405X8/HWzetNzQFWCvgxueDH4qV+GFO2exod
1Ytfk8h2US3ABCeJEWBxQDUUzd02Y6ETzWogoALtzzf5QKye7cnny/jrkcO+WQgF9nnlAM2W511C1jMu

gcQhDV+yVrjFppDS3mlAc7vu5VfYz16lwU6Cm/GVnhdxPQYRLUu8NxfsEsCk5t7OXASG07qoiWN3wgoS

2Xf+bPS/bcu+QOD/sq22gv/pxwz6OFa90ZkvqkC+WG
lwI8o9OlyvzAY6emgXsCQXR2wpEUmsYgBQdb/O7pGimnpDW07jMlzH93mroFZLGK44CyDGr8yKMH+hhk

Vlg2f1L18RQouBKGGDPEt5xxQ0zExFxWQSnUyxB0payer4Asn1NLQkcdwdjIQUAcZ/MO6vVBnHUnf8dC

rH7jgCMNmUGxR+O5+LdcJXJmFFjuFK8ef//25xVCon
lHJX5KVuhwElgt4i5xZVnqlwCVV+Psb7QAV/2sFgoprcvqiuq2dh+Iq4yFWgXcCVQhcpUFcYaw00hZ8T

PMfnaCg7SOmUCWipzGgdCP1/SMNDcIZpYWRr04yIXCr6pM63c0EV5ShU38lyCXFOAsGGadMUBIrBsnJo

9rJhZMycB1P8CuNRPIhq7Si+OakiDhJBe3Jr7IGxiF
oXLIz0GQxmTL8kMbs6MdtNcDLAZWI4S6Dy3ukpw9Hbm4/jtCgKLhmMcSL9QzI2wQuq5GnpPyNPer/fUV

xjRcB8bPDQlCy8YDdpZMs5Yh7G9j022e0oCIQ2woxBkRhKFesuiyDI6FsFdrbampyLSmzrvlUpAeCzC1

3o66FtV7eRv1IOw/EJpGl30WPsI+138VzxO/vX9nm+
fT7+77ZwrWLv4ZMWb+/oR2ZQDyK7L+7DHFkC4eWEawVZhL0vWOKYmBfFpZonMVrALubQ9Pcv5d1aC5Wy

UhsP5SPlzrbkb11Z3xZleGV+vbOcOHBxt/PVjHy2oEzTv39a20WoZ/ej62oy1lcoMPbkq9bd7W5YtB3c

LyCpw5h8fzMHkz49gn34FtmTlB973euqoVgI7ZhZ9o
lCCYPS/DKz5Y/TdnaVTLjWm95TYeJI6fii35ZdrqWRoxXxWfQUXGFwfqXG39Nj8GbcgQE0BJXq7ix5ba

7aHICDD4LJ+W5URvt+ezHdnr9J95FHZqu5foTQyyyFvENoJyJNEz0YVSdOzxCSe2uNMkl97TmGBy6LN+

p0XyeJd2cIwqOX4ATGXHj1RQnOvTeDSVPP43jFCm9Q
QXwB0mHXgSswEYd3oertFn/lkQKO35oTwbQXVdCzYJTpSClZhvH8UGlh5KEOXRp6NZ9otOJCuRBLbmoA

E4W/BPT+1ni8qfyy7iaPU5bYOppEmCecbMUdoom7s5Dp+Z9yovHynPrhWo6HDDVhneLXYC2fTUS8yhdd

IjiRpzSOz8BEf5hCKigzG/rsgR+qoLWrLFpJNKi4QT
uodYnnRU2Lwm0cD5l3lX2E5V4XlvyAAx2bCm0lNZlPS/XMDRku5Doax2LkAzaPSy8A4onwIWhgpeRH1T

+HS8YBxMqxHLHcGBc3llyJVPJkf7qq2q+4DovRcXRHAwQLXr39ubvr7lGnstxuCgvURYt8+dfdPLF4OH

m75yHKNl3u6afNKeakdv1nMccS0qT8M619Cv4J8v4u
hJAChLEJgMx3Q+tjSE9UwmSlvnmV5l8+PTgSk/SjgwRnQZ1YQKwi9BIGouqn1QnaaWYaEMcba/Ry8qek

sQC06du7IsKaN90Gy9p/LRJPzoERrt6WXjNEADyWmbjuy+LDDmQ6P/aIg3CMCvn829/DbplE7XsG1GHH

rOQYX9wbuW+ufZtbbaz90zcWM9bXgzpU6oKgHom0WV
C5n7O5zFHJc04/tOPZb9RU+4vOL4OQmD+Y0euMeQtC9seVtEDwtkrWHyTYmS10zteAuKPN2394+/MEJy

zN29IekGgAh/0rEiXPyTIUNBcnFZ/Aj7JWgKS8T0S4/CWAIB1wisTKGMrIMqYYj8HsKYSQYxzRbU1I//

zkhcLQWHn1+BZY8p3jAoqy5b1JFm/mobFPy4Jfavi/
QtSp5JmLsvoaR8D2xZp+AhK9Ph1+DxBR8KgIPf8zpBgpPr+5G35zY08hy0lkX6mKQvKdiV7/3ZnxL+Zp

Ck5tqk5hwKt3ArRlq0DiVQgaquzE5Uz2Z4dlFPt2gVHrzaqFOp0Pewyolfj9eNxR7QP1iwqhhpyVRHGV

M5xvCf4nwV/29rBxuyUMsXEINdb0WOr3yR75vF3DPA
bzgm04UWVWaQmvYStyNHBQt3D8iYfCbvXhriBFzOYQ+XTJ8aEflyd9to0qYlC/bvLarALfCoXv+yJPx8

GbwjWAb3wGaelO4NLLMT4NYNDtHbja6TYLbxA75jGVgAv57Hvmz80rH2L7ifI1w9vn30ybYq+ptndPTl

PGvRuZ+gHbdfdeknAREC4T/l9IuhM+RzL0WJuWqHmr
gBjWHGucBXhr8hJ1ry/TMdReN0VuBpHg2r7lSGze4N2uf7AGR/Wq0br2P3dbhTeL4khTbSTsrBnmdB8z

ZNF0L8bXnhemJNQDQSnArLDtbsncNj6o3/0M5N32+qQEb58XXj+c2BzIEKYrNR7HkUX1VMow6/yx8oMT

gWLCBNkGEWDAPX04pc1hf4yPXrkJ8/eVnrqYFVTjX9
E+WeaDD4adhscpcZorHma3+C/YkcGlevourHUCJaTgbaMyzB0FewENyU54fHyQylT2Oii/Fu9vI62MBy

UW2pQ8inODD4LD7x27Zn/hpVpmt4f5wbiRV/Ygh5WoAVrWTkzwy5Nc8hmRgVmZXHvNaVcOjdrrozKgRt

9ORoQ1quxvm26dfzJKO20Rg18yQqU80n0rR+PQO8l9
tViRCJbtHXeahocN3nYtY33IQ3MdIZw76FrfU+4zLVxhqrGnDhGOScShoLynqU8Q6WxGzeWQW1z5I59m

UYcEol931YAedDflsp6l/DjwF3cpNxSv+zDYkm5YF/ezhk19i5G9npFiBvonv3hY6NkV74CUOnlO3Q7o

zU5hcaixbntoMrxstIpL6Ak38x70BAQicSYFuxeuUL
/uJKIjVQRqK+RWhTuWRCTtRtt02+T3fZ8/Eg6BdV02GFwhGEh+lQU1wLnpN2/mKdChSy2iGlb9UadjPA

rG4zbacayGbsgepIOfqoO0TGDUHJtCyrNON8B6azbTtDp1R+ly/23Y9f5iQm9ZPYqKJDjhddVer5UU9v

+V6yxktqWKH4JvjykFoCh9jes7QN+8P6hsgtJoN79j
r4x9whb1mcsixUlWJj16AxPfQ1rUbDHKls5Pd1PINeloiRctijSmVogb0NtFu7/+rJEdM5hA3xLLJCMK

My/xsN/ISrqFdK0B6lyE0+MqOQdcUr4DXePA/muoQv8Q9N0PfvjDBs0m8dVVIedtRHbXJG783F7DN8On

YWdg1cmBQD0k/bnHdV7d8cobI+MDJAiI6eYq6RgCZa
yHkQv23tqXrNszvla/WT4vNVTMIk/ZOLtpiVsL5D47CFj0KjuRrAgOU3hGG/tv07NUWBKjjV42E3JD7B

kbB7p+Pkv/xXJn+YEml8C/MX4AGlOfrup0ivye3Yz0AxxQd1OlcdLFidAvSOo26N8JiE+ywGSzVZIAX/

764TFFQyoL1yoGcBQ+lhIOkMUevKmwteU34v7hr89X
5/FaaZNQb7r5AWYMDcoGvlfyyeX4lhVsezJxhLc2fmEUqrBf7iIAQozexFITiBSl3C5keiytimTCEN1/

rvtDtn9Gug5FiKE+uYk9AqxZEaZHdzxY1pjOtnrzMIczimyW29tiNnzhrlnXgfb9f3u5UzsFuf6dYBW0

/PFBvSlGU4rrGnr4zmFOlCAhEoV2cnYfq5WNgPTOPm
Y5px8a/0GWjdFqJ9Vj8tAE8M+h2Joo1vrDbPkyHjnQLolcyBVNb3yXlkqNxKLeQL85YcbAnvpWwY1c9Y

iGetiJphHSTyj46KOhm72H1Kf/DbdVmv+UTn5Jc0cJRl5ll0d8XrH4VZ/wMkLNyVzhL6/e18Bkdb9hlN

fWUZS8N3yUBjr5k/KT4MvDv8NVs5sgtncKcO19LFLq
ZUs1E8Gc/QgXUquD1eYeQzp9Htnx+0czYRpl5Ignjmyc98zKMBeCqIT4tTdkbHQDFTCvLcERxh7qhmN8

7PHzkImMuV/Aqf8k+k9PJKV9WT8K7dxAmQE5CD39vpi7nUf0EXHAHyqpY/iQpgLlhuf/Js4JghFfA2kB

4xkPUfBKp1AdcGyjrjOCyFiHv5baRy+v3/VeoGxRJV
xYm71AjZ2OQiLv4dMCLzyl2mCsKa/1RMTV56D9Ka2uU/URXf0qwvHRIcgagJ6M08aBVxpoyiao+pDp7/

o75Ry0D0DomODlC1iE1KAj+0Li3bPC8B/fp38ch6TBe5gFn1L0c7FSptN+o8XE4UsWB1rFLWDbcqItkS

pVOS/BujhqvYhkSjfkEACrGr4JAdPnFIBQEzFAqq57
oSSXFPMW5RA/q4pTmbr+L5kexPcurf6+KL7P3paxIRyA0xRnAoiUY96JVwUbrhYydSvVLUMUWTlYeO0R

rOEpQzCGpdrZnusqZFJjzkqPk/j3gAcyXafjPYw7ZA/VAZ6BjhxLVYjkRwcjIsOdrUACjs7aJCtXHY5U

qfRIt91TzEsXLI2Uq4bwJctWkXNYfVITVX8bGc6XUZ
EGeW2Hd5kVjXafiykFeGTbjtOkp6uzh1fCxcoy4YAIrMdzHHg9buc1SG0OJd6BBaVmzDBGuretNCW/b7

TJKoLiNYYE9kvnE56A4I6+oTxhbc6NpgVVJ+y1Qceo022YxMhlzzX4ClpNS0iZC90xE4e/DQfTXaXLh+

8Inpy/MY8yGy3IlrmQ6/tX/i5iH8ZFuIzQRsjfEf/6
4G3GLAJ0qsPNKcCuUaQHPVNQAUJbfeQEFbfgR5PjRLRRyzDgX16hVYlb7pwqPA1dKrQmzrs1/R4q5jCZ

28ZikIOrH0eN0GEJLp3FYdq5PwfvbmA1CQOVuPUn4fdDnmnvl9KSwyaiWQd4Cd8ny3mC9WyGNtboQADA

B87HcIfX+yDUGa68RpUKwbTYsxLB74ooOcmPeoYCQA
8UcfMHJHfBMQgDPDXfY0RKJ1TAhqTGg+/f28ccxg3cdCjnWzGSx5dx08ksrOHJLTJxy7He0of6Ze7X0M

NJdEF4wm+urTL0hQMZxY93Wsq5/9f/t1FB0hwiJdeJZT4ZPLNDBcT8jW7rU9qRxPWjVwOvR1rXQtRm/h

pqcvJdt9UUTu0n/IZM7ZBpP7JnEmOS4x2eEej9+Vkc
ENZq9T2Mc/qbFE+ik186zSi7CSfkP9qhYQvrmUs3CLyBxsqv0M3J8qFAeaXhLcbIiru2UrdN1PJtAHu/

F3N/JZFR7GG5nx1wTTX1AA5B3Z1wCoUXQExKKOZjdzWlN+6BKJD7XuD3U05laXXVcSgsSKwMS/893r3f

b4/cbRYdTx17L9TuPy3OS8BzV43oI9lTxnruvsLa7i
Ok1SdCCR41m3J99wDjrCMR+R5lGLIPnfrl8p94GRYfWDXUxIUQSKxFMik3lmcUl87oKEZWMBlcsw89QG

qCry6w62h34Oj10H82vHqxX/RO/7p4jyBcMW9iwAgeDg5hh/vlawKy6TOWc+Zxpc9s+khikd0GUvcSBR

6hQUlbsJvrFDanEh/xuPdtFqq84qx/FSM4cTv/fyzr
//SZreRzHMEkJbktI5DWfWkdqDQ3STM3UYRmyGmHCO7So8EWVok8WFB2SUEzBU+gJLdZSs1F1c1LYnwG

Sh6/Ivfe7KShaYJcfdq6Vrhji+z5d/VXzql/67gpuqwhU97NgWR9fBYaX+mTrh30t6T03fU0G2q6LIS/

LWMNDNEebbfvw3LTT2D9PkRfYo3dmf/9u0zTDLQJWZ
uPhK7ooSXINv6sdEYmKBoeFTB0zTuqlQcYyU5I5VCArdVwbg5rVk0wV3hsTfByYhXOXzaQAWh8SYgUd0

wDqbeRcpJHyYGtzL7uelqfXyxNpB/M494w9FUYLvbJNkuT8DklIcvv6tqARGm1Ma6euRCS5+0aSJaI6v

Mm9KIMuAHbw5/RwnL3AmY6nlMEzKO++qNML6ARt9qw
KMkrgi5CY0D2Lc9NJepcMVGtQm9lY8h+HeNcpgXyQSamH/PtLRSmDmt5YWiog9UuH6BP/htjbRwEN35V

Dog6xaOdeY36SMKIvNvFlnN3ykyUkW+fBvP6XB0H/yohvwVI/3K7/FWTMNoa/c8fA/w7L5FrT5kmm6I0

3jP0EYK4T0bqvCL/2ZM+0QMUtpDnH3CNQBx87s+uEa
FJB1Mdcy/Hti9Ej5xxjxuCYzFW80etBY3wZuIv1ri/O42acbyx0EWXxTkyoaWnanumXhrZpHyvu6aXuA

oXNghlirGb78yHhKpfUYJVm+/19HfAAH9686jQZO5PoOzP5VY48z735ySVNql/Ld+WmcRFbgST0cJ9H1

DzUdT/6O0Q2CIdxMg3cC7KNOqi8mcUP328etYvHtj3
0xPHTsD5JpyDDL5fXSEDV7+QlvRpEDO0O4emL141kKWLHD3aAMhi7cu2vaD8TIszA0Xt4TglQk8K1laC

WZpRiqPX9G5a9y/5hDpFlVY0BmDO2y7aoJFj8pz5GF/Gjwyuf5lXhsDMfgHAs0OyvLT/s7J1cqeh7QIE

Ah9G00TcLyqhyEOBOS3D9FvKq4Ige/1UJU0ONAnBRh
VS8Zvurxcq2DTh7/Bq+Nwhzhotj8dJqWP4wMWsXJZrZiStYmG/JPFP0qks7J8/zqxGvRw7f8kP9h5nQ6

6wjxYZ8Q6FxPy7+4c7ptRFjrWnIYrc5q3CVprUWd9XLbILk0WO9C3x/t3F1DmBOcMvQ0DHBeesil4bR/

SIzEgOLob4Lo4b82bMHOfaSl618dqIS47SLgIH07Kg
jfExwTx4vhuWTeZsgi/FBR0D2BQgHPEccubJIYIAT39Vt/MZgxiqpDP2XaUSwOBGt64+7pa4nfkR40Az

uwtFqEojQBVJ0WgrXcIr7RNnKiv62Hv2BDYBzDsqCfovC+sNf/96zRa/ATs71duto1+/u8KIsOEuF2NM

CV/MknfqOgO6P49nzuW9UuvqC8avXGJO9Rr1rKlNMU
Mt31GpzdQ1VCORcy10zSu5Q3GY9iKXFYwOVy4Eilat3iThqLe+zXQOOOnSHysL+FEupS2D7Ot9QrgiKf

ntclTqE0nX0M/XEV/tr11xIRpgP9X2/8g53F0gM5jsgBwJ6Ml+nnp2ztyAtHCi9YwNooWRP56hHsAQ0X

5wqkco7jGsQiuSyoUF8OOI5mNYyAVZgTWosxVBtlOD
4YhKG1X17GtK9OYKXZej8IrThULpGrKkCh3R9NuyMLycYX/v/jy3It7c5HipUulPTDpWCnvAs5CPYTGS

yJEvvihtPko0JB+2OrXuAFOtWlIbFLqdrTu9I9zDsEx4OmLcM/3Fk2110PYfI9zrZ8spktSgCjWLlhbj

A6vXp0mUCrnBRr78Om+Zr5WYrvjX9ADx2K6UPpIOr9
80vmmIEsMZrnf0wkZoQruqwYb6zgZRgDrA8R8bjBoWhLAelUNdOLsGa52t1MPFaZmCRwQH/241qlQ/zY

bfHVmW7A2Gh9uICZAf3dPA1uVVUN+UYDpuzFPf5AtNAc6w0x15lsJiWK7spuWb07lMB0unmQTAeiG1Ac

oMx6QSNSc4p8aOUhzKYG7PgXLk9PISrIh4hHv1cj6t
htxo18D+R+84lg2grS+mQbLxKJFhah16KHuZR2EUP/XEv5o7dH2mAlxYduRuWxAm2kdVDC6azyhQpQlB

rbmH84MJXGyoZwW0KxhZvY4s0nbsMuu6fE3Lhj5nPIjmcq4AzQe7TVnDXPNkDt/jZSTKiSWkVblkNoxZ

7Tnz8QOOC7VnWDmKuR48md/ir2ZEa2Inx2RQeKU8It
IfI0knkciSevn/1RS86XnHHseYmH63/byC9PtF6H3zM5B/DNwr3Vz+ySo/xplpq4+DwiGv6fiUBFN5dt

qD3fW3ZDbgZv2jUStDgdYOBdCzIyUY6YtdBRg1b1ksHvpVdpLp42e+ZlgATCa4nZdU0AAK24iRcE2A40

gKHULlqBDoQC4d990vERrpO0z0YBfkS12jJsnwUZMi
uWD4Ny2jBez8z7ZM3INHcws449ZvsiJrExDoL6TSFJ+nfzRat7bkQQF0pdYfqDBt86/CSjofs4wzLixZ

tFz+uXjW/VZmr5QgUo16wQrCOjshwCpg2cxWD885nEm+FJm6d5Zislky6BJNEqWBliJZ0V759T3eO3TI

Lo00WZqixeBnOHEJhsbQKaUeoXQCt4LuzDQpQ6bYkf
zht4K+Jw1TbFDX7oZd/VmvU8AMNc71ICIEmR/qnNFagx8nKp6jvjjGayHwlvdfekcGmTpm/aDkEa7uUd

fmebfqE7AF9vh9fdmMC4DBM6vYWaclyLIsTljbsupnzz7m3X0M5L5AfUdZpIMCdyINty7CFIV87jY3k7

ywSjK2vPEsua9h2+9PeSd2IfckRE+Ec2JzZdJhnPPi
7KzYW/m3QtI6zfjGzS75JD66MQcsu9J0DaUAqm6prwe8DJ+3NXiXxlOTavoHhT34hdZWBfyeWTIRNxYr

OqPyxL/zqCscwdCp2NowXl2V+jZDqv/2bDknlb6P1juydUjH+tePzw0oqRWdjCLnbonu6edGhAB6dN9b

dWBRHM6EQzx8fpRpK8lVJ7ffLF6Xw4Vizqzcp7WRoC
lx8JA8odERFB7VmxdpTB9/4g88//qiPdlSde2FypT5kFJ9YmtDF2URsutSvQmQmltbbHnK+Ugo4FfQ5e

r+W4G0BSKrlwdrcCix5WnHSXsa9PHhsNL3fLTdY2IxvZqc8ZXcU2FuKebJpipS+zhEZCQGzO9eIdRgFb

IMRxci127PyOaIPJnpj32BPTRe8Zk9HCn470qH/ujV
zdSR/Sw/CWpQzD8AST83g4K0/c4XDBJZvs4Iwgi9//pHNo47oZcCSTQRI4sFx8e5MtevFyrgOyEPE4DL

WjYmOVikBpqHUFia7ns3NkT9dPIc8quqgCjzW+x65ojq0YZEEzo8lPfNQDMwpj1gFPFxTWc+F3jUscw1

KbcTWp1aXAM2AoXtlPCCrdmwUZ5UAarzo+D7Yp4hXh
wo4zikA1vea/edh2JUmQaydC2urhh0aIx0uSQLtF6WI1Mim8lxsuStg8P2JIGTs+mFePrx8HNvx5IoiS

QIScK8XyfJ4/yF3Q+16zqjS6HBx/z5tE7+znOGpTQIwUEPaDEil5NGesHr6zIonW0oOkLVBdIXJES7uV

uiOZp6tTQLu4aGyqseuyKitsFpveQGybxz+zEDZvza
+itahZkxHo7rPFK9YHMzHLtg+jAOwht/ikRS9OtitqGI2bGi9Mrff+APKs6ipiMFpee6qHwDArISkIiu

iZpgIkTuBQa5uwivQW8OyvxwPtCHDEriNt2SAFY0f21XyxBwMXtT23T8j7Y1PYGcK5srLgA9i0ilAElz

BgixYikkYJgZeeXZOfa37Osj6JSYqcBBRKrVzC2G1G
U9LGiIZA2n9e7jXtDwLkkvh6wvkev2vPuY/KiKfFHC/zlEKQd57VS9sD8Dyrl0GTKNbxpjmpkj0y2TBp

HXIM8EcDyA/DpWHjzcgbc8yAzfkoT1xkTI8kksyd2xUJZZ19o7HiBOE7M0l3awnQ1ddQI+vD4rH5tVOl

QoePI4LgNvPoOUNd8fjv9rwGBsYEAMBDt8CGNLgziR
6CnBvYOur5hmDYjQ6R5Hk0OVq9yeWvYKxO0/jhJ0YYaq+PBP2K1Z/013hg/XhgDcgrNTtOgfhR6bNMXi

f15lxizCyYaHwyR68izL5/SgsvxUuZwNJyWTMYuvGxJ/k9azivyp+JlSJT7hI34OIt8ycoXzSfNH9lDb

N2Jw9IGv/DqRevfffMbBLwn/alpcRjiMM55oozngit
3RtbPDJsppRCXAv/jmiuqpSgTvGfuKrPs+RC6fHqh6utYbTsqN6BEhFCTSuyoPGNE5KaunHa95VBOZVY

K9K0xLW3mfRrEtYJ9HMstVjolqsUZz9q/auf3n2xTPYthwm7qcg8olRtgaGCRYQxA0L2V9PyUAF9txbj

vTs1RNjSYqVR3fDq4XocOxzQ5jFq9A9Hz/9XtVRf3Q
C5xsPm1KnVhkB83v7zQeM5wXqhEqSKMO0acn2msJKW4DWnsYsltmp3C65vj55ipwy8ZFKKdunoJEWD9O

FH5hhGmv5pHRkAHF4jenXLqjz/Udqoto+ty2Mp6gyZ9eiAPvrBOP5XcFE9YfYysPJcH/z0aXI7LW8o3T

HUtkFTVEFHBfC5hBSwRX/Jy4iiH+4TH0cKgQfcTcCV
DhTKWv3NUOzjjIfGuL5bJxIrmYTNY9eGndM5sm/7vONyN1OaIjQWh9KoYSQC5xgWrp3DMuaKDYli0aN+

sx3nkoS/5nHsCm1O9uFwOrwJNcdVn7WVjJJYDrpNmnLlzHAjiID4yfT80ymgghDhA+7QVXwva0wgtial

Cis7dmeICtT7NI6hs7z+27LUh/Kk+mafAVjiSBq6WR
YWxK321o/r4blS8bBuT2wn5X650z5hVy2xEPSwDCBzBBe57Mafke0RH7XD9FnsRiqtS4qX3oi9caqyfX

G8xy7QI6xX3nAJ4DQD3YCMj9Qb5jidN+xePKWmnDXSv8/GYE6tqov6LS8g1f79CAUlYrRbmJv/vxGY2V

NOy5D+nDfElQv7dTxmY8nohNTnnJVfqzZB2DFaG+we
+3crgHv4/VdKqbh8ca8fxNN9B/91wXld+b1nvTLV4s9XZoyRueGNfO61OtCAU0ZyTgibs+UMe5Kiy9av

Vo+pB4B3KnuF1qLcsgS+K2aqkR/MbxmjGyuKrJ+CG6h78dJQFJyhEoJssf2cGGsbK0aF+8MCHS/ug51c

FjmzBmKkwde2F9Zqhj6bDFVPWQ4JrdGhvOYvyQ/ANC
yTpwg1rj5PNkunlw10nH5A/8SC2CZLoyeZN622C7erm8RM9JwSU8G0ulV4wXa6J4Ef3+rhkEeJggbBqu

zAbXX5UYmU4zsL/Nq7hIF0kS8y+IJXTlOz+WK0G8h8k3yvUOvD+46FWZFYZEvsaqcl1cXgdcR3cacu+e

xPTiwzjqTkn+1lNHH/MGAdGl+UYFvM8pN9kpVylLpW
u7SOSTWDay4fOsaGfgvKYR6l0BV/eEtsbp/FI/AWkQA1MN9G1NA+MJm54zoQTkqRM6Sl+da/dAn9k4i4

9HMnH3tx0fjFp0k9NzjnDBAKoTJTgIUjwxOkI2jYhU/rbCvhYzIcZ6pWCsHhgrm+eH3CljtjnB0vbxA8

R7hgTXWTiZLRGHj8co7oAcI0z/5QddYLmVeuV5QmWn
AHwyyxY8gVmMXmPo90HZOq2UD6jy9Bh95/6ZGg17MvtH/q6er4Cg8bPm1aJmKjRm/y94w/8dfbY4QiXu

dN7g/PNrUZHtYp9dSSAPC/kPSx5rwV6V/q+F6WVgzM8hnfJzCd3bcdaZo/9m+qaDwExmiU9ntfY6bCMc

R/O7nj7H7wwEBCpStG54cXS43dBDj9gGiLvXpMK4PB
RIVexeXDBBoegitpJNL9LzHl1Aw38XADf/Wxy2Fa9VAxT42Fz4BMVSeRHcJ3e979C4BtTvyfKfquNB7n

rhjDPV8XdQasWTSNbXgugg4AylhX5lHnCBT7JUtWfQPeABObDWsH5e11sxQNmJqcvkpiouICq65d1hyI

t9KE2n2ikNoC37zybjWiicE1kX/qEqRrcPOBpxaql/
V7avAj3PWoyMeIszwWunK4fiCBcaba9cuKbss2wvHBvNwJKLTwKm+gtTfmwOXmQwh0hI2VtXXp+1CGCE

Hjx+g7UfBvHRNAd+mQpzv2oYWf4wmFX+sYmDzuc6od/xF1+eD+AUesrCZiSVGf7ujtouLRB3E+jNj4WJ

DhBl1/xRjegfIxuiCDN+hkfqtc5f5Ukw42O6zk7van
Trymj9PmrlQeIyDN/p1yxyTj95VpD4+qMnrMvdU7xdvB7G5CnMy3AcaTD82Z4V47Hoso9q/1VVEsHuh4

NAwstTEUhMksO1GaebeRxf6pA+HZHl85sEIXBSBzRzwSAWhCv6dkM14abtTK2ad/xiw6Onufdvslho8h

zN128uqHAWwnR8xvK0OZPnDVRvrN/nvxNqpg3eC2AA
vuJDC6uc1qjuRQSHhbQbAi6C/qth3iOGe+yXQ/81xlmX4g8bvemR9lNZY8Sq51h542EIecuWZjLyHAMx

HH2RFHdTpv3Yxs0YikOe16hxzqYjcqWBEBm/jYgl5XykhtcJOjPNPIom8zsUndThjvRCGRIl6sCxGDow

rqrbvgAYdTLNogx63qPY7QlirG8/ys3ea3ocpKFguQ
AHrpisXL8Xo5cCg3TXhIhx+WbbP0WyJbY5nV/s9XoOym+xunOcSOgbnLGvzuaoM7rwI5Rh+Lpc6RYei2

Qa/u5deEJjkdmlw/lwMOI9i+wZrMK+KG8FBQGgI8wsb60VKhi12qjh3UqSvTatc4vCA60R+hMy+3l0cr

E50vnHfDXHg4g+DrByonxZZCUxGWuco82b0Am2hI6/
37poo6Djh/w29MzBFBamjORN7FyUNTxthgWUzTCeH0xw/XRTci87fXK2U0Ysycw75MWL4JM/U++jCcmr

Xn0rDlEzDcstpo/0k6gyxNDaBt1b273QO0c8CwEo01TCrBM4nhljSv3SUAU/59ezsA9PPOLtVikbEH9F

zZLmqD9UiOJkmc8sD4Wsf6veiLy6hpUtAjAr9vkQ8B
XS/z1Nz0OESZ1j3NC3SQcCn/gsjHpj1CZ9TP4JLXBiHnOadB1UimzxY1oMSjW05bxHGkvG7+x3Z7cH+O

NokhpQQB6ssSghsPU7QgK1njKUuMLK6w57c/vFnwZQqhmul+PP8YzjK2SaNlvcCKTZ6ewXx7V1fPQndB

5Fdrq0gRlyB3Hg1GEj2X3ZzVmivEPBgvZxO1ffdCQQ
ipW487Jpd5xsBuhWWnJjeWWt7eRpl74jo7jV97GkVresET3rol+zRMA6ATKo+iJouqa87p6XqkOB8YQe

WK1wMVEqKclp+lOb+AmcImLyIOaU7aEh+njYn61iAL6soSouwKYntQPiSQ3Oho3JGbBLQeGm/WMIYbEi

GihsAA1LMxwdZ5dFWT3YJS/2ZFAaFz/4MqRcnkI+o6
d68F0mGpRhYnOCfX80GUVKvthjPsGUjY3nQZ+1Bw7qFW44LOm2DW+0JTFOh3BOzau77gZy7f1K8YutdN

D9Qn3v3aDArZ39CsRIl07fuAx11CmYZBUnAklFoqPq7kF4b1VSrxMicjOPIxeO6vuxQYxLiK9QWI1Oos

AwF8KkSjGxUH8Wz8q7Ug4cPdKCCNZD8HHi9YTWQEqd
WTWxejtUw6kIHJmfss6Ad/CX6payHAp5d3BBJw2c3CJsELHWM2RBrZ71iubXowBHGxsslAkzz4PxPnB7

cPW+oBHED+amBMq6zn9zwQnzgrmlvXkrOHDXyqGI5pKXiGIanGqk2Lp4fFthuKMcirVEVlvlLiHD8Lxv

OXMqBvAUXI8nEsoyreYK9Iokc0vzXnZL6jNbvr4vN1
BEGeYnIv5YvmrOXSl87+K7C3PMVwGdLk59nMUz6p736IZBB1UF0KD/ECMkTjmHDKbdOr/H92YK1ACHyk

1b/Riz0XMAU6GZ3l0AubXjC050sp+HUXcEE3Ic8Z4n0liue8sjJLGyvO0SXGKtDmn95Dxsfg3Pa9d1GW

1PXaFnrXhKhqzBPLC9ikCSgbBvcf8F2xxK/5wlPjrk
QoO4Celxkk2LgvsZ0uNgxWIqs0wyFbsAt+VFBuq38+jbbQrHfj9Kd4ae2cwOgYvOUyq4tiaL5R0xejQN

q/zV2KDhO++mcw7T8akCOKJG73WmdUeIEDbGKza/tXAyCNfrNjN6ykAhylnDMlbQIC86FagHyzmYYF1t

/Un2y6X0B/Qdx817T7RpXNPybLzspuSGFa2jbhr0z+
pWJOg3puicqlH5+eICNIZkXyfS45thvs74q4aGpNHXzuTTcVxOYPogZKBmm7qncqheX/3XsuEVUNtLdG

1DtSwDd4JNGtLKTrEbWEx0XSniOazyK220JReJZWzDxV83g8d239ubcIlbA5Ta45ykytGQIhsw8+h0oj

ZS1q5BokNcJwkhz/Bt9LNy9/dJSMh+ganH/DZEAg9p
DCKfcTr/2vUaVz5Jd7Dl3vnZ2SbCUsaxs+/9h/UYsItQ9at827wA1AKh9NRAAugn6w1qkam7gIRVJGA0

Qitd8bcDCwyVPkL3w6YO4gitSiOmm4+6HhAjqA8dBl9mWGi5pw9eCJVlFlcWd0f9gO/qoaM3GDBhQexH

J9ddJ7M48QkFfRzqZhZ3ovj8UCbHztEXDlRua12/QL
hWlkzE4l7LDvylpsXYTkQ3/BWBpddSu+eNYRFAmvT0orTS5570A/LQeFWEaRWWMeCFVpMfj0936fobrq

NDN4wl80Qwgp7gA6x9uExm4ju51TdzrkOg+Z8gOz6xpvB8XTCygRB8smelfn+aVj2wyFbQbewycZe5Kh

qiDiySa/a51kqgozeDkfR726uJ3APsqgKfRgI0KrG7
jIe8iBiZ2WCvDeL/1F0zMNfbyVfxRKqqxhXYoKWADlM49eDK61LMV56CzwSu6uii3tdrYubSN9d744Oa

TY4uF6B3vIEFhY9j7nNDoMaHEodz6BJ2ic3Pj4VTHfR7UwE7z3lBoGnQQ7lZSkIQRgOXnzJc6dQghOdA

MMiMhh1h9SS/E4YtrridVhl1SgK8oo7sF/3bVoZhum
tyUXuCuquKA8zPftIQBlVa8o8HfvaK2kHKAHysZskP5NK2jt/10r/MmqMfWJxhuShkmZ7vd4zwjlje5B

5yqfBVvu2OaLz2LtBSKTbqJhlG8cQ9kf6dBeI0No+gfOoPYGbOtVpmTQLHO8CW7V+mbdGTzBUhE5pr8w

jstftbW9WAj7zOehjY5tl1/2sQ3Bo54uyafdU6tUUP
vMGbLKxp/ulzAc1RJp3ALa12ZhSA9vBkpO3p9WhEjZxilJKYfsyrK2JFJZGN5Ml8Bfg0j7E6XvT7Z/4I

7p6b3XCnPEhDOFe1XsNm/9MSo/ANBZzWp+56gO+mbaT6FxJ78NkOBJ9t2wG7f/pYGY7W27WbBrId3QZr

+g+v0GhpIJFgPnUa1+FQQJ7g4rz19UEc7TEBY0byvo
27rbJUxmJsSyIols5KLkNr2K7hqnL8YhXBy0CFcVwds6DHjgvP/nFCM3c8oZWkmgPgp/Ukv/1OUkTVJB

PnLbmJIl1sm2gjwQ82lgklnq/fIqQ55UKvTSWuN0/XQLuU/v7CEFdplhuWGiy776VdtSYHh4DhJL52SM

0HCc7dRYj8j64e8QfYiWgO9chCNFwtoUzdbcJlg340
asWXb6AHNArhahLIbM0ndHOOKljleA9/XCh7wKCupcKbcBl8DJspgRfhriMwO8vN1jYsmTscBvgy41zt

/0cir/4URMQ6SjNgAc94t/LPX+AT01XHViJ1xV2dYYt55vfVaXIgDZceVDi8cHNV1fTp27CDkeRc5Zbz

v5HW/oYhrtJKS6Qgl0rVCIK5P6jZjcYp9DQ1OeYyxe
CZOlzpqbnfzDkZvMMMc/c9r+Y6z/Ho6+aevyCUG/kBzoGq6O5pV6n0b/6JJ7536bGS869xLZ6Eeg+M8W

JleH2loIeZC7X5mvtnhHchQefD0rueicPiE7gV/uneO2ZwAHvcHo1KolsKwHSTSlOcDkMs+vwS793GOM

GrZRriBhOQfn8gEhqgSm9PzUDQ4D7IAmpvkX4Rsi1Z
WJzc6D1JnO+WP+It06ZXQwTLJVgKJ4ARA8r0jJs0KlB51h3qDbEHW6rNI2o0lVN4Cn4hAcm/qk4wPj8u

yqrtR1J1hZVie+EMDzZ+Kw+Nb5rTsT8syJ495CtpzwulO/bVgRJfzbbAfdQf5o2vEvZcTCGizCVK2sM+

xotlIRmQ/NxWR0QjM2rC4nSi1frXt1A4kHAWFNjZz4
uE6XOR1gkELRkr4sRftRS8DKGGWE89fHL4Zeoa8uToHtnU/I+X63QeEr1HjR1onTFu3Is6H5SxD4MA4h

fhAv6cA4mncjRYfMCl4WH8G+fkYU3ZMUNY2VUI43cDkRmKNLhrJfLI0sUdJ70xlvE4fhI5xJJlBnawAD

FPTUkO0hUB1VhK82P4ZUtEFsBgpMoRkZ+NrrvqOzFW
RjNllA0lzwG61W7pQAkhF7/OXlmop2r1OZi+F5lAAmzOw0tMgXHKH4SsL2Npxw96mSuHhrXjD+esEqX2

gfaEh0loJgdSMX2KOkL8a7pWvRtHYmw6UYbFk9eD3BroEIU0xWtRCcdB1V+NcckjySQZoskSEQR9PmTD

dUqjRiVTzvdd7vsCHTZjw5txyqbOyI7RVGAd4nja9v
+nMS5WQKH5zOf4xbghwmwFt/gTUhJGyvExK3hqbgXgxT7V5rGoz7CzRL+sfKtwl4lXCh/FAeIslKPrFB

F3mB3p1s0meQuTlMvps7l5jJF3JhbvqIrpszSJ7A51suL4AG2s7uF+w4j/RuRZbVHwPxlMqnVzQM2yE2

fBmg7dwXS10/KjszRQ8gpZEPD+7EJs5EHjh7kum6ik
2RPwHzu5rkc6fUj2lqNMxKBoq/9v+//ROuegi8zqSy63DjBmZdpAgx1fkHz3C0+aXELJf2k6dz6fC7Wx

e69kNcp8WRj+qvejC+BC6iei39QZpskTdgam1kgfALBeHGsl16t70QV+0Y5x/k9JK2bRhujpRM3Nq2zY

l47mc0Z5WQTovodDBHyhSdYWKzu/eAP5xl6B7DvhNU
TSVUd//xyOMBCJnmRdyq6fYE4CKi6gWyI3lHfnaD6KhmOww00wQgGsRMMEE6Tuae63swIs/suFj4/sVY

he/L+aFDQJDVj1cc8yv6+2DaFwkGsnd33eUjKyUCCf+xEBPhoqDFppdk77I6lR4DFk00Cm6bQyBRoQKQ

m2KDIDcPlm7T8nZlBQxhuwhq3OWrsBmWpm+QipubKE
YUNJ+PlFFDqXqQ70ube3KRkQahCm1qTwrFaB/G25se2/j4Lx2zkt7SDL9iyplJ73L6058GrvOtXqHAZp

HP8zpAbipx3Ym3daHwBkXxsnICGr2iHZ3BhOwZ9GMNA6HHHC5284g7dylXWn4CM0rS8wVSeYPWEh3Js+

VyLL1730NRbwd8lKCEl3TBNeEOwymsBwuLXwDE07k2
nAPa1ZF1qxv/aXzuI726Ll9rxhEGr295FiaG4rsbJtLiHX1k5yrRgXOdfqjRcc1BSB3AWOZzbynxXEq4

3p4/CiDp2Zg8pBd0G0Pv5FMrt4GGAOqEsLoDmOh1BahjDegjgJjp/kr7xLUPKunJL+7cymKxnxc5Pp41

UX4kk9N3MNSuuxKfECOGAD98Xj5o5RpyO60uI4fbTD
5yhLPLp21iiBc8Dac64JcHycyua6G7FZgfsoXp0H3pfGV/gWZKz3jhf65nxdZt58ZNCEbNFIuFz4917t

yt16Im+NSCa+3eAXPr86xNzkiAMh0hSchLGKWwRiuKXoU0fLCy4KPNAnp4X6AnTqsw0txPk5CvLCKilD

kt95YR1Xz/WTjeucM+HmVit6iehbgVD8hcOtFw8O+S
H49vbD0kP00Cd7IBzpX1B2JHBy9GH0vbdsTfvipC45ZqYQZiCR3iO58YZ6a3G3wnBAaC9rLCXkd03VE5

fvd+FXGb636W+mRo59UDhpXg7XnkvNLqcvibmw/ZLqU64jyLGd1uYlbaFNnDty73ulrG6jAjUNcXs5bb

/i0vYsoLj5kDdbPU5SjdPDoxkXxqrQvsRcAqadqkjN
qMGcVoZJJ5AQ7xkQAHqQzopaN7l6SfMCtlITf95uPIwzwjRepygqx8Y7RDD6CvzndvM8J3/Do/KKef0d

0UzsZgAXcveiY/GOQ3+EGn4eNrwmAW7ygbdL+33UXKOhnyb7nB2lH2NsesS5sXo66JLN88huC4G/nizx

PdKdsurCcxxWgd7dfiZZwo4tIGcrPqXhlCHYCdbbst
sPIeqDe99q31tUb52+hMAkLRYwn4gHT8KRp7gZSWOKdQDbM0ZIxVjhvuPXF2a0/PO5nKEwx/gQiw8zJV

x1C2uZBrFF4bfeuDITk5Uv3De87onbmpyNwv9PlUhYIrnxCHnK5knVdMS264XxV44AlNEGx5WIyf9Uwh

GRYHjXZHOX3i/TPh8sDk6RKiM7oJ0eRdCr/xBKPQ7Y
7LFoiIqyqKw1vGCaEVNXMl+C7dGNzlS8hwk4TacjeN/tKDXLgOf8K/yNYQPWKUAObFS6GTGeh7NHxLFe

42LY7K3L5B3pz8gXdkjvJikTFvDxkgLbP6Yp16xqODX3zHFWjBNWfTSvBLaNSzzTJRuzgGEpO1T9nIxN

MbM9u/bCGaxQgHuGhfdoprSWF92NkjzHLTRoIEPecE
Slm7/G/Fub3yj9vII0pANzBMoWPFTO2EMZVDq5ZMbRQIX2ZRqA4LScp2lIJs5nZpYmEFdby1w+SOanSk

mxIyYlaxVEYBxd3bEPKpnbMrpdx8bE7/R22L0VQ8F5aSnLvzb+TEkl3TW1Fs9TpaxYEoDxDLRsHlN2Z0

JCpxl2g95YsnzWR/AhtvSjAr58R1ost2MvkEy64jAj
hhbpHWFFvSjlBxS9MziH/Oki5leQ2j4WGPeQrOK9ZsF8eUTtYYv08KDQTTz4JU6ypw4MIr+nYIS96Y0i

UfkgxfPfYef5i7MKxQbKY1n3KSrpg7U77V/BmFlvU0BEKvRPd8aA30F7RcnPKc1BKyw2tSwa0IrZuxGg

A+n3dvkpqqnbCyffOrAd8cXwZpe3+kjhRadsDcJRog
4jjJEjTmrFhsLWHA+xYed6an5VlnJOS25/OChCYOvXC5YwvA5hJq795DzH4B99MOYJBdY8IlH+teSuX1

Ysh2xCM3PsEsooaMA8WaN/MrBSqfIPNTkqUPbJk6MKIkhRPRuBEJxX8rQyj7EbRcdJiuz6CQPqMmHgq2

4dQF/BVaw42jh7Y5PA4aXzvSbcDhNENKO+cYfJ/MfP
gfnA18OhB83N8IGqWwl775ctwcH2drzoCPMIMW9JJ/yrVAAg7uD6s1dkZFtGMQ2p0sLzsaEmizxHgbPy

iK2gS7Xk1VKe0dLPQwN/p6KnvF8iDdEUlYKJIfPgJK8uHr8or8UR90miCvAzGbYPZJJu16ZBQ62H9Bgm

l3uf08JRFku/iRkRMBkmiqyk8hmjc9yVu25/xH3pNL
UMbJpbil8KtOkA2zPYLVYCynnGAlXP0Lq8SBTBnvp/3IXSCyCngZcD3xlILkx2FoFqb3403fHm+U3kMy

QQPykzPT6UIUdNF13cWG31rSyeDo1Vjb3Q/q9AejLgSat7KWISJ5mu1Thrs7FglGB5CQiKiw3hBtOnKX

YSK/bTr0XBG3zmG5B8o8MrLSMpyyEc+mdJ4nkFHcpa
ECbPQ9jBC6bmnpnWEsE4vVwryJhs2PEhb4v4iNi0huL6NmIHkeatwXkyUDvsFw/kNX9KxtJYgdfHQdmm

BAdUVuFh9FWAW+0dsAMb0LVEgsV84GHlst60uwBkmTc8EcugorjTwmfs6M9KLsFlKF5Glh4Ubg86xndN

25shsGv1UGE/hAansgAmsUaZOYBkNpE4tOLsFuvVmc
y3zaTL/vQERz2Rqhv7AMChh2mv7VggI89aFTXy88CEZ80WpVvMRxhZxiGiFJuMmyyfNZJqJQXSygl0U+

buW4zlG0ZW+AdyJNzwNl0mVD00ZJk6WZn8tBmZhkxCsGQaUZx/tEztrtxfv/qQ86vkPqNai3vf0rf//A

8fbiYtgEnUduZK0P4aSvw5mUER1Lk/1yvYQZlD/2ar
PFAJzPIj4LeaA9FGckxg45Pkc5mnOTx62qy5zf2UALqEkujafTE5cO0mpL90j6b9kJRKlbbbj6an6ph4

HixBpOwCBYVCzsJYn3T4ey1oP3cDGazxtfbpENpBu1A8wTJtFjRmxbcdyDqyW69xbkhI4uh6GqEdDSCX

++hMqyFR9lUoDhAMbqPm6m+UpdhQ7cxtcYqZGsgVNa
Dg4441GCqVBAOOQV7Lww4YVak+8j0vTmPa1Bae+oGE5xRNNbYgJbL9DD5mpb44rUYfnjOdc1JaflbKf0

sUaKC6LryJoVn2pDZBqx+6bKwhoZi3gYhKWv98i06ylW/4iA+jdloV687+P4j5rafccm7EpQVBFcNxvg

xcMrfe1BZ0FkMhtXMUNRtCIn849ORQbO5T4KPxGQls
9WrA9cvgfLyWpjvWYuq1EpbgNBM1y/xbSsuDK9g8XQXru9k8LGabD8WX3IGHzwRCoA9FtDWSAgNY1Dqb

xrEVr/7k+8S2HzKQuRmLitwJSyNQNZMfabNvaQAV+Ux3RJmSNII1gSBnxuNsMV7C1p+wf7ikSMsNHl4q

vzYhxJ5L5sfComdYefznPIYpy80dgLMsXNR4zuxVE3
nZdkKANXXY+T793+jV6tHjDyfuqjSKwjk+MOKk8FNh78pfNjniW+h2p7buvsTTf8IO4L7YFVNmTHewKt

M0r4sS6GxudF0ApUnEkVHSxjbRBaKEkzmi9w64M9wlIMw/cuRvZlHjqbraA4nppK4C0WRYgo3ASqBKtA

0K0U+wL7tSiam7ajP3/YJgnMV076plZip8cXGT5AEd
YBjitnI4Xr2wbeBR1OwnDut8VOaRKGDhfrcTClyxmpT7DSNZ1rMEsHYcpuVyuZWMarAVsTUV7udPRv4L

dabA0s9r85K+860lhpUE2ULsuBEc28tvvSLr+g9goNDJgSL7ALkayU5juksiJZiWr0ie4qxbUN8CbcOB

zPMdbLje+GIEyVG1seJ/LRs1qLFOMK0XEVZtqUxLRq
NFjJnjrN/0cF1YGDYE6Wc+DxqOEcpCgv1Tx57650S9GpuVXv69A7lNeteAuVcjo2uwKHBf7+WedSohg3

+MPFJU4s8TdKvGsl17XyJL8vHbadY7UzQ1zy0DDWnJHhmkBUMRSGvJNGM0dfer9YLom0/ykLGv571jjw

q9B7Z6WVlZLg+C/Y3EJT/lIPAsLeq/XCEXrPsOa1FC
jt/mIdivubO5eXY5aLFULqpuybFpoLv3jJSjGN0JY1qaogCF2MtnwneqeGlZRleOODHuubZ3wBMCb7tu

h8371EAW9sB72fmS+33EE7Ja8gv3G0Vu1lUb8bw+GmRWHdRY6mVyU2cF6IR9+Li22o9pA68LI6AEuzyW

sTEvvSRshJ0NVRRZ7P7tkcCwtuT0DA6gGZiLrk9C0e
kkrYFsk+MXC9JeFui1cIFROHgq300+lSY1EGs09wbhYd1VZNlBoAzYjRRIbCNaJ5IUmiX0znFxKpTdd+

eF/DxW2KAY8Wm0scEPWVmtaeZNjBzG97XAsW5Ace3UYGS3vzAC1yscT6J1LbmQaD+4qLBzZRGaKzAvcr

MSrTtAv9Yv4Y1G32kbp71wsRPiJ1ak/C1Tx9k9Ahz1
eY6yfrb2uYV65YlHnk9M+4LHwgq0mZNH52eVYeo1LVuOvHI5ZZTsPBpmGKyuri3qKBj7SUl0u/Fl1C3H

9X29hy//84bg9egIX+2ZAB0TAjj4m3o+OnAls5ZK0KnEWA1k5DfLLksZ9hQ/j32SGr/q3R5LZAc4ZMWa

8MHKDc3IpUoUzQAb6lvsfUlmJNSbVVcZ1wFvOhrFEE
qJDWOAgt2HVv/vaVvEXk1rCC10FSEhY44XSxWf840JwWRR91O08Oak1dEoAWYw/mGN4qI2bgbA/mUVY5

GnMNOBd6p2ked34bqb+K98MNepyY5pVmjpy2Db6EkW8IOhnpY+ZVCIqjLy5qr2HkdhdSuLL/aPMx+3tc

DSAOtkT/bxxFX4QYLywU92qF4ZsE/R0molMTGEAuHZ
ZxzCRJT3IX2P2eFc+Xicda+qLbbfW8RJpRaO+EYhIwziA1o0EkB8v7bqipazH2dIcj57JFSL9Fsd+BFk

SH4QQB9Vb1N89Bme2++fXHQdckvAK5Dx7rGc/qNlsSh7ejrIihrdCRATswwXJr92LyYWrZZ2nMxvJKq6

KuWg9WKtCQcn/7XswxHUIAvvCOnppRWIWK8p1gxXII
5ytYBWMOjl9Qko5CC6lYjMA/ohm78lHkq/34ZIrBJ3AgbtprDhTk/AdhNhCPjB72Md0l2rUDdSmcfVc4

pxawskPEqpeQX4mQUbpXOd/dc/Q1+BPlmF67ny1/sb+NoIqp6MDujcOSH2udvF+gnx+i2H94j8ojb5dY

M2dAE4vbAbOVePZcuL+DypkcjsVYMAFg8ylhdyuZLV
a1XfVLdAM/yaCSWqXagAX6WEAwVyUVIvUOhA/1qC8P+KbkVeeq5yVRIseBYp7wI2CE4l5HutsKLxTZvO

ubS5zz35RNXXHUkWa5yyNEoK8WA5miLyhB6hjnh3bAvtwh1XYT/zcz4i6weLbPZ8kQboIelNSUWNHS+9

HKCEGf+TOlqLsQ8JvY4imQ7V5kwX8pr7qbNa065mDL
ZJ+TRZxSCAOGvfUTd3IgckgzqwsWrBVPRDFMmcJxRaMv4oVPvI7m7G+5ugXT32ASUO8VacYA258y96kH

1HCjcyZWgCux54LJdfM38qFJSmHyY1u1WbidJ0CDemcYQayLbw1an3lVxsvMoJCfagwHRgKTBveirA8m

IvVb1s7KYERC/n4qXS0zFkgXRzDcU+3GhtgLojccHz
8BWY4dECcsyS1kEURsEshDmhEmXpmVaBx9AEPFV8J57l4P58g9Hx5Ig8+qZROGEp0gsmR8vZkSBvfFhj

QtoLfS7aIgOGKTdLmsQWENXiKCk6tWP9K6XlwXcKc+YsZ5fjFiu9psnGt16v9+AqYu3f6nDYZS66nefx

ZdBvtYpdoYjETeVMttKheFl+DtBTtMsLeCHRhLKUeA
DExh4/EEaIdZWJuAVygQ1LXsUe4dnmHNMSMgYFMMB/Kkj6eGV4FEThio+wFyOHlYSkV9F2s0WAVWux36

E8Js/D5BD+0Vd1x0tZnGkY7wNz9H5PuBeoHSFLy5KmUgXEObGJt8+9iRma8R9twOlrNBlZ2a0cCtwKkm

VjBNuB5pqBWVPTBz3qG9lG38XD7xXtkZpOH9GQt09C
cECz4dpmq13/24uLd4zjYn8oTfKS2TA0PYdYe9nquPtg5ZPX8ygsve3tOXvOpNzE/3GbO9/aprOtWG3x

szQx8uLKiZ8NMkQ5bE99vmaOyxV5/h79tiEvrDkIGyCx6yboalM5Atuue9Tk+YT5uRcTu49coqVoo59W

oK9y1PcUiw5MjgPW+S3P4pJpGRDJ6DLIgIl7W81lae
9ujRdFqTUPzOC42R+Lr8XBEhbU3PgqhWn2+Bq3z9BrPpf1D1D2cRg/XPZhok90woGYaLbqueGOy4U+As

SbabZQGskobdeBAtpj+CMSuZrg3stMwgZ94PGHAEd5temO0yoep1S0vtyGrKaVmhrwT7lNCaHtp/fvg7

jOur4Ru8cL9wL+tAiUK5q0OQDekObDNNAWiPBmCs77
sSQIIYR3XDH4Ib8TNZtJKcpkV1C0u/P6BqI57wvkv2rocMhJfbhKy2M+GBP3G1soOnfnFgXongOdWFC3

lHly7u6c2SasK+6hgtiDsuvxdD2sIhAIVRTp0nTSWbOM/SrnUJJQ9eKkGkUuhzc32mgrV3eM1yHD5+cP

YX+BI3TpMRnzZSFU19J8+7xjxBL+nE+avaeLzCjbm8
JRaxqWwIDJgMQwT22SmrpY2+IsciI8rGFy7tOqrjtcTdykdcUefvohnkA2tzPbn155jeWTwsFutoIAZw

rNnN+2QHjEsE9efPbed0ZX/uK8RTxJtmBJN0hfVbsBOaDA9bbokLpK2JJUS1BqV6Tud1BADcLw/6Kcy8

s3jMkaazzv+jZWzUzenNYud/6rCk/8S/Aonl9fwLCF
C1GLkfjac5Qf7IsK3YmdhSW7HscWjAg2zjaMB/otCF2GOqH5CM6gQdrJgBy5h9UzFe6um6/Vw5zxAks9

Z6LG1JXN8Gr5BYhuEsS/N8OHaCHZmrAJiVZRvy2xP36gDZaOU1LD3NX503vI8I2o851VC76kqhFM6i/5

WW4eBc8lKLddu6Hd6kRANIrjElHDZnnUKkeRgVM1AX
BCMeLy5lDbZTtsdbGnMTHFitX8AQSkQFAeza3h9OT+ZZg+Yqr6d1ziVGA6TjmUQVy5vAdWT6m5+xuVtP

81i7mt+E40haZWoBhgUE++Kx92Z+3SyqcHjxas1vW7mwDzIHv03X2zVRZcyZZRlH/EoAZPvdxOCZEE3s

5AQjWYEBY7Go1x/TFd+rY6i70UFHgR2KNJivGVYzXJ
e908QPXYpEThrMTeevlyogbx46rCeiAGJhVWR9Az4o28dwF6StwSdEKdwIdRd0+S+9umVEuLTEYPgG3g

mFbOWsYv/b2Xi7QZzzNCO/cUkMTzrzPVhwFxMaqj/CQ7SFXHHXMBQw7mlwo8LjyVIYZ/bCUkSmTCtU1h

m3Mfp115vt2fI0eCmx58M9W4u2KaFLnxTO+F4THZYW
kLbukrQlwOqv1kui8xTPY2Qoz3rxTy88GntMDBxPjGahIhoDGGT+eBVOGo0yNfhEiwhvgIqvgXE8tbQJ

TEo7xVqXlV0rEind5/EVl80SwWF1UKx/US2fUECpFB6z4y+nqEwUJs9o46EGc2Be4gen64+lZXNcAJEW

fabUeAfX8Zi2oQ9JfNfvLZaTwjDdO7/I5DY+ajfeOy
7IPChWRYe5WdaOfS255RvP2JejGE7HBSgA+gk956kyjSSfpEEk8Pnq1oa0+muT/na58ysGsb1C6lhyCM

6jm194k6lRy8Yj2wMF1QAUZ6zLkUmcKpPFtRloHBE+JRy4tRHye5kvVobYz2n6t6plKed9XpA5TOGf+p

uuYfx9ciclQQkuh+nYWE/IfA085Jr9pdMPB1s/U7bU
uZ4xkpOoodLSJJvBbIExhi7VNjbxGhF/EQ2uWvxx/d5JiAY06DOaH9Da9pSTDS0EQ1gdHjti1usxF63i

nz78HA/9ZmyKUlIqFINayP3RwOVJBJaiNo92dUIhMa1ZMmA+oTn+zSbFVYxJc5EDtQA/9oJknK0t4/tC

gdHTS9/U/n36K37+FhYkkw4qBGbm8CzWe4asmqZK6t
HT6p68qwm8TSN4pX1OsEGi7UgGUBMSqR2Azv7Hg38h1PO25EcIFDQVm/zacptZcjCU3E+kWgefzrXYUX

bvLVWf9dYB7fkK1bicnm0mgCpH+mRr+oKnxcSBNps++XurAWQlmoG22znk3O3xtU/LylSPEN7xr/SdVq

GbMKxVaKVdUElLh6cCOpa1J1zox4YHm7oaRsLPuXf5
m85F8gxpK1uc/6uZq6FKO8pC1hVxkjTpWdSYRkHmoFteJhUcqDnv8IMZ+T9wMxuJOwJpY7ZzdcKva4WN

SUNoEoHASV39150FYWCBlcAb7Lvpw1/1sVEdtJ/Egcku1slpVnVq+Q1hpPcrUeWTj1jY/467qYxaqvs4

rImtMB68cmlcNmnt5X84mwPK4XX826lf510/NEmpoX
JWeUE3DgIgeO6tJlss3R4CttuhTNolYEfjVgidpq3UZVPChRTZwjcR/iuZL3gp1ZQ9fj0OAIO3YqI/E9

iU9mQOSr95zKqBSJs7Cny9CCKcQnyHaJLZYMC655jxtIW4qPH2y/pvT13jdOmfyVCmWhAGvUJPDXe2Ca

g49uAa/bt0AZdKuDmBl582z/JMDzmCxf6hVdcdAZZW
cwxSip2MFAXKYlfx+tSxbjAmUnVmYAHpuLg/H5EMBqo5g3ew7cwMgLTZiqH/P3zmmDJcntm6OQYC81mG

B5FFxihQbBy1BQW04/4ap/38re4igtVbn1V04HZW72at0A13Fv42hIVmJHC287Ey38H7wQ0GQAuulbr7

CM4lOulTg2fnNmqz+wb86pzu+h1BBXyKIDu3f1WNf7
CkNh5+Nx9VbmIWNbeyDIVbdWSp+ixl+q2iDglH6ZYM1C57LjVtWTfccMaHFZ9c7bDRWL7ZU5DL8j4+qn

IRszK3P/6bOOluvWFBvzXH21nbuvlFn++YpjSOsAlare3epN08jeoB+blBMsG9UhqQiX4rhOdf70Erbs

gX0EF+lGEkappDETfS/hNWcm2GC0YA5NbRfds/mlb2
Lywkg6KPN87B1pFVlfCWaxY4dL/M/OdUCFlw/qUdoeSXMqHx0qaUoPvozfgStO2ItiC3L97oggPnRBXH

sMDQwK9R/fKR2naLb4orn3NPoDCkBeHc8atdBEk6xhzwrTYz2xazfLs7fJWJKwxvmHYBFF3Lae5h6K0h

Ud8cWMi2lF8xbP/yjwHUc9tVzoXf+cEDpJaTlgcpmG
yQzEigjCdJLDg4fCYmZkJ7oFwJvx3nZ16Cwvk10+N5siedlw2l9+XvfWgxp06F+nQtMb26dnMP0iLdZo

ab4bcxkpXg9CbdZSqqYYR86IgHkz1AQVuRyOVh5+aOvPCguxHxMyQf8T4QzxU+7yAlYwk8QLES+SI1xW

8/rQrkssuungI/dNuOl7+i1j9HXAceRIe9aBa6cZpL
V9sjl0fMLDGIbJ8dO9NsNPVgqR3he4ZNKrMr9zmopIhX7OKZXJb3K4326iNxEjepaO9NFMv21EswDNy6

E9E3n9ZitzXK8mWWwDigPAoFG2y1uHKLOuWeS4x9WScT0O2bRnY/iaoaVJYWS95rewZjaJod+AB75CW6

yv8D4pcTHVLLpc00Uau39iAOJFdd4euYcjBJ4orTjc
gk7RlOjEn01Vnphq9hU9FgN1WBZqu76ihRxMlx+QCcJWp31mdTouS6iZZzeF16KWdWghCdq5G/wKFZlj

k/iNB7EuDwjV+J31iIRkSLU8eGLvYt47ntrpzsQqV6xyXii03RJU2N1xSQ6W6EdUBE5tIh1O/LIWTCXD

xCJNhoNH3hDQ0QuT0NRKmUgoD1p6JC9f08l+ANSe1q
QETHicffCYrGGvczUh3+72kjv3dU/gKxQ9dOgH9DbL2XaL0Jsfy6MFV0FJgmZpD1F1//AkBAAgAlAyDn

VSex+1zIG6LPJRNEZXJ6YOaAN9rwlYghp9GZUlHbdtXyJjCYfskD4G9uDmU9OofLr1bYAM7IUeRy6ED5

khIhXD6s0WVXsbe9Vz0g2UeNnap3/4cacFKW0CzD+/
lv91Z0xGm74EumRP6f2XAAaX6WSYzwKiKfJ68unA8ppuQCdCi29PBJ+R9NtavyHu4Rw/kNPT0GI2YrH4

fZuqo6a0NFc94BP+e4nWSIvuvDzGrKWQVzx31ZA6eFhxNoa3RaiZcml1dcJukc8TwuR1kOeAIJDFBJpi

05vFIO7Licm/369Fcx8aCN8Tgoxh7uIn6aIcGaY+XD
FaHMC1hD1v+0/vIEBcX5qjtET91Um7hJOScUHT4coemPRas2B2h6pqqhZTlazaEU18KO6CzfM+mNkJZe

su1idqwqHvOXPTv9id0kLvubtkzVPC9c3Q+mdlRTGt63vLlT9BRiOwPl4N0y2ytZzTaKN2RwyeMpkm8g

tBAlsE8bBTY1Mmk80BOJ1g+mi+R0cFqp08Rd5PXZtq
1GtN95wsud/S+VHv/1XcCGFajr431VBwKdUGxgDWMLTIzrFY9D3blW1eFCUuZq3W0dXCtGpmhKcwg5kK

BBJaQgcDCTVSN+9v9jnn/+ExV2D04O/8qHyr9rDwdVvinNioiIOm281688sEqT6cuQsFNVCYp/mYV7Or

4wxfglwDRMHFqo8Cy3u8gnv+DvxboY4kuT0YzNd3eL
14hlJhZdU6ixZT5wO03KkJUIbzrWqzHewaTLTeez12w5HTnFbGoZKhVfMXV5yWRlFIZTAT8O9Z9XJimQ

frZjWmC7mk+XYj4Xjjzdz40TlLV6RvTuM5GYe9Quccq7iVBzsZ1ZovhiTN+aYKwhP5Zf+DXyis3avz4B

v1EbgGSXlOcCZPwZnwBvei1WVbQqqp9SnM4oJFkPwO
Vc4IRMja9wSTrpcBMD/i8kHoTSux0+ScXY3eUB8EgPnCyJk3LFLGvGld0IRIX7oBxTa1/+i+iVi0G07r

ifwlAumpp3tin6BpTmfag1+ynZxS/0MgKTFa8WBKGZ89RtQ8Neb/qC+n05vPbkQ3SmtaK5EyT3pbYAJO

/uUXyxJ4Bmov4Bfyj1iU4nnfRyxIOKLvrxJ2GeheJV
bTESY1G5hpVoax5XS8NW//uM/YvskFPZFuj39+babDjY7wQaqtScpgBtlCE2ImLP3NzC1WXxyHa4ZIcj

u9ofyXeIZqTLNWPevaoUMAevCTjj+HDNBbte1yG6WwgoaU+1H8DEnHLS1hYOji7VpK1aVafl/zfcgh/S

zwDRb4+kHcbrGQc25FVEuazs+nKjtfT/u1vReYY96o
7thwSUardInv1Ko904VE8pU1GeRMi0Y16KM2aSij3elwS9qqVx3UhZvWNUxHx3lTSG+J/BAuFCdM+142

hXgrklrlWztTywNCN6I6UHwS5S5j0pJK9FYIyOas/ij/kaey+jkgHOolCADDobh0gLVAJc5vOHpV6Aok

a0N7d9aBNs12RhCfyJDO/lNgjjIi/lNgBrrsAacUzH
UAdbmHF4BjSHR9DChBO/vGtzncwjxxdoFLMS0ziynnlJtlD8QmdF177qb90G+GV56eh65FRfwB1496F2

+rKFfcCZ3r2sQXC3/rPdu5Duw4JeuHThAoYxARgjWydJWGWBLTozU53kEVGe6FTSzz8hZff4jMU0SlqG

HhZX9dmpkGLiiLdwR/NcefN0N/fOFuw8kMXiF0nJkY
tplUWF+8r9mMY/0aquMh4s9H9f6u/Ly/0OFDe5IbZgcl0GSkvTZ3AxG3QIQJeCs8bcazdmFWRTgAyPmk

sqDoEpg/tzjOspDo6+96752tDh7SmwMZ+IFy7cNQv8I34iNyPTMaLm1/Eke8JV0v85YPLlmB8jKJ2Ncn

oh5Y0aL+kUz04/5dqw/3JmTluAacTCZErJjUdPVIj3
cVYGDhcNevEg8AvXdtt6G3I+hn5jVg+S/3U+Oh3/y/o1grmqdZQG41i36HvsP5LoS65RgKquhZ90l4d6

hXSUChps3uLfMoSBvS4u+TXKwjmkdhJkk8kBnd9PDAN3ykEdWxZrnYbfBf2CJHZlfwidFzHYn+pAxs+S

O/shhu9qtUZFGwUU46ohle3t97UQz3P3a4cYk3eTwv
j/aVvGqOzAPywfEIV09fPeRhuF6woGC7BgtCsVFhVY9PeNytDPUNqHo+jSCNzxeXSs+fmxe/2GaPOr96

vjjJxYDv5vYDFMwKw/pY6AWjCYtbXfw2UsOeEUViZfD4rQorGbp0TjWbdjm/54ln8vRjgnTzbmwHxE4w

q4k9WFvKQUJOFaPUWxTniKDHyxjwbJznHl3xO2pr2/
/oWewQdOaaory3B8ei6t+c2/NyGTx7VekP+GeZxnQ2K6fB0ZcOVvFPy49OiBotNwPE1LeV40a/gRV7vO

jXsQVeQzKLDISZXI7/XWHGmZpJCss9BkXsHtGsaQ9iFdAMRXc+dTmXHcMa3+WgHT6dh1EvMnG3KnxllH

2yShkLToocQDoeaVnSFBawf1DgsmeJ8FPx10tK093c
pNIMMDGpNce0xIg9oC11iy4u5en0qiJvFpyy4+75rAwQOYvHqgTduXFb8TdKO4s29Jl9iBeHmxhD8sof

s2HDn8TS/CXHqm3KVPtrsyNaTMRxoJ1SUayzevet7wNup06Ymn3ggoHLTuzhh1n5U3wCH4LFFhfP5wK/

tBQiFlDF62KBKZXuRZfIYLuAKg+xR4GONEzTeQz/tq
246UQbprpGvZva1K3Rv9DcKZQTmA94VWQpGNKxHFut29QAFnvsQWXCypWz0PrATDxRlGfuXoDCU0swNH

2Ztrg1KKl7fIhvzmVD2wGgZ3wfypt7OMwD1CzR4rbEtsPSA9o+zy13/tfhAaiywjJnO+gV3R4TNZGUvf

wu3U0rqr3g0eaXyw+Dn2aVk0qnpiSEq6ug1QQR+0Sh
24ydc7NksNoPqDW95O/sIWHc4xwvw8DTnRudtb+mYGFzwIHLQE3aq6oqoXgUFw9eGouMMwZyss1YiBYH

Fb2CwfNSKky0LJUeGiTOIbjNtpQ6s3P7TmbP8sgrzgzBYJWn9SvXzYO+kZs5/faT+VGL5EmgB7+YwTVj

YikL3vanFwrDWsaVQ7T8cD0G2fBGlUnheMq9lq4L/i
F4T43sEqhDo0pL4yW+evx7HMbG8LrCTkiFDYuWgUgL1Ote2nTBQUZNdC0poX4rgTRmNZwK+D3pRYy7LE

up9QBeSO1IQ8GpNZzSsHqdjrEMi9lUxoJI+8hpfVpSEcjAMHiPO+urLmn4Lp3vQpDGm6yx7bgEQ5O8KZ

25aLV881R7X12c+hWXWbVFMw3l7IUK2Zu9fl6tuTha
FkdtkQHBeAZUpGA0icettwEDQ0SfHvwXxnubxVboI83V9QjWTAVUjUDC4B+QuxrgeeatzX597EofwA3x

9UJQXFPqUvVWK8LhN3uyzogC6TtJe/MfGlBL9SHGx806BDaTBvHa8/S/91GgvVj46uPFgz1S91EvHe7h

Bs6TCMkltmablg0Gfsu7O+Iqtn1CrMSTJ5w24/EB58
tFq9w+JvFMcTYh10y3lU85wi1OFX9IIzWRL2j1pY2LzCxH8nzbl4mSiCGwO+u73I4LN+ZGa8Snh7WzWL

xfTF+SHrDa0r1HR/e7CGD9KVZ2oqgcW7nR6RoxFbTHme2l/dhWG/toK5TpKBpioJsj954gzjUH/dIlyN

BnNGqgKjIM2PBbfk1J9knahA/Nm6S8iWTh/8a2KmW9
qIVz+wPmhcMq161k756wwvre3ULet7FEJYt+ua2zEdAFmfjtmoZ9djoAaoQj1pPD7AMcFaEzdsuFeDwu

tOPG1UeGDz+yrxe4Z5l/2ej/FanKo+jNnBP+ukyCizW4nitdAp+K/EcGYi9FNIdN1AgCTYSofi380ydT

CvcZ+PwA0qksnI/2vejs7OwY/uztPsxRY3m9InWH9f
A+bfyqKrYoPEsz+jBDXHWQ9mJp3Z3XyzLmtJIxPqQg9dGSbJ3RQlHwQrpoJtnyozsv11czoLALW8vYNn

qhA5UGKV6nXPVHLqQyyHX64vzqGxP5CXc/SqvosebE0uREWwJd4hvvz2dcWkKelhh84z1Y0fwSq2aJTm

e2BxS4oaEubBGicwntChLq72CXLFnyZrfKmcdzr2bV
StfFPhPfb2xIsPPKNlaHq3UUpFklU6Eo5wOUE9y0UIbxFEtgN1iC1zKnFXmy/TinPaINHHLhDSNHcYvv

AF/TLxkki4u+jBcMhgRv6ob5CDHXgDhbBB3x7VTY+JjJM+DfBBEez9H9fHayfcd6Jgl6ZesG5H18TzVT

KGZXq05I0YhSdc/C6zfUmXLSS+VEZjLiWUOHoNjxuD
a+KoivZla/HqJ6dl+LD1bpFlTN/m8+1/uVGJ+tWgvckh4g5He8kNIYF0aTABjtHQ1RGIZxQMp420Y2pE

4LKC0FtpAJ0EBFoPciYQ8l5nQuSVUyV9iTKViT5a7+S+mPC+wH/Fc0QNBdLobT0HnVWvcY3YxcCjoxwc

zTORrgZLrzzHhCu50GSY26IejrBvkXlxVlos1P9M0K
RNUG1Zgevd5HjCqLF20RT+16ZKgcWP3Wlojh571gsHTVMhLvVscnyycXRkI3eTjVZ76DOj0F+QqNcSfz

jOARZCs2U1tPqI3YBqwXA3fImbYw7wmrJ+GPymuUCAi+xARZVbiQ7Ik6BjWGisg+A49+iUlGz36hCHs3

2L/lv2mFd8m6JO4zgyxoOQzqAPgy5hVtf6a72UYU+m
KMmXQirztOATwrboUB03EgvCvqrmuIZBRlqMLIJNVcyQWjEifrHr3r6YcSN738ahJVQHlcxmctZmVDFL

HUy6rKmAg52UaoLSsIegf9yTsgwZ4gQFbo8CaAF1TeFwXo6Hz2ptIZpiEKaWxUc//41vLOtxA9Je74OE

reNb1Cuj/Hp56pFFdWe2qYej8Qj5rU5ZdEseai9j0M
oXWVMrGYd7HwDF9tlLE1KnjOd4blfvU3e4hzHSMG0vVZuyrUcbcLiODcuxQvUTSt+HTpcHe+P3wSahWv

NzRlzVeJZgfs3GzWtRdRKCFjXtlMLfq6BRE8pe2XovNG4NyTNVh9fJAcDpstA9hCAvrIGjIDV44MqWxP

khhCixOMEKnYfhwPUpNIAlWyVH3O6YObg7AB2zVAxU
lzZy2BtePVoRHnWeiN366GarSLceFJ8daZJOXa8VU8CqjZkfbVZEZYaOfRhUNc8OtbTrnGLgLU28osJT

5wCeEc7BYbiDOvC67WrucpTgYfzR5J1yKdrrCnaOMj7+XRrV3p/qGNqoFE5c+plrY/QH3XVf8Aa9uNXR

119SKPXYAr9jKamAkEyfKX1Ylq/nMXX6p0kiocoPTm
SSBZFq9cJ4U+AeFG3H7R1WRmgQXEiAdy+5O3Kbo3Zs9lM8dI8f+T8sKIHZ5vXCKVrzjEvr6MhoJPJ1m9

KjIRYvSmraa+JWi7QIQI5j008LJvHRvg5OiKaaNd5BSuJasQoCWx26IPTxIcOyhPzGmdIt3SCgI+akz5

mqDFPrnr7QSXlIQle+NG8JxrgRuGVgCalumxXzxRHo
Sm50wo7qHMzRfYCbulPLEpLOUX+ogXjRr1pnEqFRhg9wYd3e7xPjdYvUZL8NXAofzv1ayvUC6kWvWYNZ

8NzgtgiMzt4k5tL3PqX/RajscAAGuQnuNo9e3I4Cg6t2avlTIWLNW/rEchw2UKU2cIQ1j5r2nvQynVeJ

YokX1ulVo4jmCYlu2U5O7Hv/UtGwLXSbU7Atk0PgDl
88z3fso11GAOGsEQ7ZsW5lubvFMt/CMg8wxWV+FhSyq6o3vAgIr3gwY34czU7vvtwrsrYRCcKhNRYB6W

bNKkunekkb2PfmTFLulgrcTDg02R7ixx9HlmlxHPkE6VaTsKFt6aL3KrB2qtXx5vMl0pEaajSs57UL79

nHfAKNCV2YC12ZqFq/HI3oAwyCjVJvuoVHdjBPWqVE
3PN5nSlVzDa5+BmnZFTBLVo0lnjB9bQ7WONgXg8vcMJP0R+xEjkkG5iXzqMdUojg5fONRZlEuCXoU6PI

lSpv1GimLsjs5zPynVC2A1qjjl/yFij/lxXSyVX3Szd8M3PuxQep0/gLzsYLmP0973rceiI07y7hny5k

wW9Ez3+5b8kHXuF6hFl9mPkqEsdXeNN2wzS6jQU30N
0JnRYenrG/ptg6UQQHRSgIqxs/jDXETbOwPqnB4BloaWd8fAN08QeFiZkN9Y6p/CEsuMZSg3+Qr6q49U

CYldSWmIS4p1RhL/Em/THN2w0qAaYjmI8C8MADMM0sQCyJbCRLNMH1Ko55Nm00lSMsc5pEnAE+6k+nV4

/t6NbxjUT4o823+eYsJ22b6JzGrSJPNdihGATr/Y2b
dqH8jbbuHS+hb0gzbJGiuYjgpnI1rJ5b1CoF/mD/0z0l6WgSMojrjUIZonlR3GmeGVP9cG1DCtC3cKFd

wbUNCw/1Po8SCzzU7loEa42j9gu50guYqbuu5j1hZ4SR0Du3kp6o8/tmcVAE8DlyyHD2rK79T8CjrcTq

WNZf3qxhn+QbgcV0ni6nu+ZUguBF1lGj9Xv8BB4SIb
UgdgIXBjkOxerZWaLOlM68d2Qu2DvkwsqpSHBoKBqZ6AmBK2nDvHO8Jps5RgU2wFB8HF1rp6NvQkdNgu

2XYnGRWcO7TyitBAFzNqST62FfaOGQrBrDM9Z+oYmPxBm1DUuujIseYnizLWkWmnCLn78v1jMMjHSI7J

+pr4GfTGZ/ShtILq86I1fJvbrxgg2aqvAFA2zEuGHb
4jV8Y61L/cSdDeYomu1Kk+Cuyrod5/ElM2eCsVY6N6K4cJ99L0aTas4ukwXuztsU/yubdpbRdgCl2QER

7epReCAqSU043X9UOfIMjroMyqisBgAc4DOMt1jAm9QGqIfDsx9v7zdDvzetrqDBWBhLQ11NyQkS5z0u

nDpFp3HSp4/uGY3ic7U5SFcGOFoHucGG3xchAu4jfb
m51fjXHd//veTye4vpn7KkKB6x8RpVp45kCW7N6BtOOj4LYQP5iqhH3+6PA/RDEKjWB5mtOrzJngV5X/

ui7Jc6MaeyY2GvdIx5lvW47VuLvtNiKf+6JE2G5wMTF3fd+bnG9YlEjDC+fondQB+2SQ2O05gypnjt32

744neC+dcpMM3DcRqYfIq8tU4ZnmXteGyFsZ4my+hd
3xyzDvGt1QcrTjRNIOO7WX2hDo9Dk2YCpTeEIXCkkPfxpIfy66p7VFMa1ixOJ8YByE5BykBFtBnFH1g8

dNivCBhUGA3Kb8Advgb5ZWesY74G9TXcmxop+FmPQCw3ov3L49SGWs2WjpdZYe9xARZdTcyQz2U6Wayl

J3azKS6S3Ko96hTc0FvvEdS25knUMUY14dMcg4tD84
gNVfNt9Uz8+PfEvqRRytRtEuli3AbONbpoZxmkFeu0mO8WmL6UpEW+3jUy0ThWmKeAT+yZ91ujBObN7O

3nxyarzRu4gOam7TVAr3ZHD8FJoEi471+VjjY4OICC4hz+MSX9/CmuaYXOoGmub3ouM+sIUb4yYTyfqz

Q2rjZ+y2tbqSgeTJprW+fqdCVkOugPGsjgL0ILLE3p
sptttwMl2gxCw5WphjGkF/nh5tPgtVEw5sGRqMLTy2oIS4bdxLeGxKrzNMdu5H8kKKU0id9DMJHHPx2r

8Cuad7uRr0z9AG3RBTPwb/Wbrto/ZQM1z6MxfXLnhU6F8k90A2Nn0MjqBbB0YYPzNEla4RWzssyLrCgI

ihFSPciXDyS+3W/ieQ/v6l+VDZDLcQDNC2gnKVn7RS
OiZWFCHIMl510IkCG9BZ0svNiwtRAP5JkQTe7XIrrvCX2keRtNcpFaPLRXFJh0Sqm4yfkn2xhC3O7nkB

j6Hcl9AA4XjiftpSoJyPotuEWHgLDmEjf71cwReGl8IH440ldEa/eUkDdI5J867k5XjSwKcTq4Tosqyy

dXojbXWVKPCnXiVkP0JhBneFr06cHcMwjxSaQRGPWO
U27l3dL07+0o4vVTolCD66S0NnrZf+OEFw87v2wzp5rIK82OBeW/nWe36QTpcE5sAZ5flaEaU5/K4ibs

LSNVq2Eg1DbCUCNpsDlqbbdDHA7xon7ilKR0xLmY/ptHsR1Zx7F4kkilEIZIkOqNhvk09AyRwtP/s7Nv

IFFX41Gn159VwMPX3501X2YAGS4LkECfD2wKYSRobz
qhrToAMGmYvCAFzf52VQr4tqq/643eoc66SpuDk/kLfDQvsJ+5RZYp5uvAasGAmF3S35mjJCgmwv/NqJ

p8dqmrqiPLxrCqClE+W0HOyGFkHidSf/qzE0/gjOtXWfr2MkjFPVycdPqGb/mWxTCQ3/BhxHS3zwcRBV

m4FQSEPiuloEKousBBJG52FId0mXpbgMAp3RyzFMgh
1BlWotVxPhHiiB7BIhbURxHO/0fJTYcYk5WKnwl2bCuvOHb+W33yR93Q6PQipKpvHDZUD4kynRKmc9Uw

v0n+u5X8xBSaZNmomBs6MW4JZc4wag/rMtxke7lJyaKGRl5GWuDv3ImYWYnbUh5aEe1GOoJGRmpdjgVK

AtLQV6SKVqDr8rtD4C1RJt5qF/n5eZLsoeSagE15iK
Fss65bt38Jq9DU7r8AiRRtNSFC5xfVUa0DDntzYsJpo7cZYK4KYFrl/OfDlzlmL68dtgoypI58Y9ZDt+

rEMbb2DzXFX459A3OIuAhHJXqU0XQDNa4HMH8YUHq8E0SKoxm6l7hyEL7OxTSZ3N8l/CUPy6bLUSYAYS

ZjLG1tJM3pqhVyHh4i8uSo16Az+8xAs/FkFOJTGa4q
jK7mG9QM6cg67s/LQydOBI8L6zuyUgQju2eyWF5/5HBzWCo4/kNt/CbDWH7ZFS6tzlm0U7Itqf7fDPEA

bwF8tnBulidEst2wN0yLPoTNvcwrPAdivqRqd8oZZygWZsq5TulV8idz8SgMFlWuydbOas9A1ch8L1je

p6+sJCtH+BlsSo1N67z8F0IutvHEBhCxmcsh+T3t83
Uvlr29xlwjDlUojROUFl/WmFJtrlDUXyQCTsyxR3aMeeHs4Az2noT2mCue8BCwPpl1KJRCZO7Mhun8K0

yrisKP0wC0eEttQuIQeLNdK7NCkKg8tLNev5B8OljqsYgRbR2K/gn1u4BkLVcJZiKqJ0lFEiDySAqiaV

jCrg1vdyTYjTSDWIcfCF6l3j1KgAlon85c+rBADn1o
1vzTAYp9IR/fJ698hcfQxggP2R97Z2c2IRxfeYPm4PlbNkrgdIkrCbUlZmXTpEx8aAyuJiKw9R/orh/V

w16LIXOMMEM7NpvJMv6ivFMGUuO8FeunD85xQK0XKwsAjqQnBIa5QXnqWhDwZigEx5773W33bFfEQie7

y3YC6VLtzESc0Im9YAFJhIw/2bRBNESFiO48CRiNrm
Z5oTposkQ5lvC8rg0ew0Yt53qzwFwIf5VqS2o4G2gtl3slSxWZp4BpqNK6b2iDgJZ9WgenoAachmKJBA

e1ht8RaPFB2agiBSjKJySlTlIwz7GaQ1RRmxTHw+ljNn/fewk0ezkDOY2he7HaL/xAnqTGfDAlHPpzG8

nLICdoqaesEEdA+soRJ650tJIeVXzAn9BF0c273SQQ
jXZXWl/bqyvew2BR9VBgD8YEEOfs7tfZAm06eDHAyfI68fTwZifT2Nf2dGkpqbJcWrUKQ7wyePpL8Kzo

lOl1Ob5IrhYMLUCyxhVFa6EwdF5W+qWbX3i+uMjv9yNUcudZxJmWst+mYMXpF6SYX6phICdDW3IY6thI

BtjFpM6/ywDRCzXT7N3LRB4S/MRi75kXRrJ7F4+0zf
WIc/8hM2gewq+yfvwVsySMWy8u3h/rXeG9ftMmVQW+G/MoBjObubPIjCyTtN5LGtLkHzgYwc3CHAKnVF

kIrV44DYV81sV2ex7Mak0AZbZA3GIFD0w61Ew/u/dU33IAzPzWr01HdWqGZVOqavVRuQyjnH4+41Dga3

5MNTUxgUCWPcFC/I+mmknrc2gNZ6+ldiWHfPTNYZj3
DcCKUNDA12RCLmHNPm8ZUwJaxY7pHmUskRvSKahbq82gGGExoJlJkOyBPSMjxMZ+VMWjGOL2uWdhbsga

7KQ0MVVKIAViJkmymfhM3YQCle1wgoF6BQaPB6UFfoFUCx3CAKreWsjkUh8CtpTk0F/3WJhDzBEB4S4p

dQyNQmgdqz+V9OGYupz5kAQj3XEIC/2/amHAlOFoGh
FNZItN1fLBARisVY8aZFBbg38uaC0Zj4gG8fgG4wRzLP10AX81t6se/w78pB5WkNiPb3UidjpS0Ib/r7

UThpNqP7P90yzWEdRu88hxGhtimbXL6q0s/TcaeprJZW29i9BOeWNEHjw9zhY4i51natwvMnRlHd6wGl

gqZ51650DLqWk6iXBZXmW+/++hfltTc/8+yZiR/vam
+LjX+3qX5P0oiINuGD0WmBMLilnULmgD9hl4XmXX0Z6ozrNOkd9cPBHmxYPJyn8qiid2goRrpVUDsNoS

09YUsYC0Ap+GZ1V/KnAUH8VleenI/kFc/no7Xw1XB8raVBvORe0FI8DN5r10PL//ipUn04oV13Eeysea

qoq8xzDIQT20YRsg0YkEZX2iSFJuPV65M1wcptZ2Yt
RomDuKnZ27qlFFgx2k+XdYqT5yJ/AOBmjZu/R19VuC5VoJNpBxQNSgZjGy1RXtR5SPvixCenUDXBkfPJ

2ZJG+YWuccXKTC0TJU2oMhAeGfycJUESk3p3qn4T3d/u6MQ8CdKly66qpndwWOCIcaHyZdsxYCedhaRZ

hEG7Z2l+L80fX8sIimeV8Z2P0rYLspzfWL/R94ocEm
hj+TZaG+yOik2pP/ieS7SF4jkLisKXbCTzbSh2lTkhY/GZG9wc0EvT3kTehfXcZenwrp/XR4Le2JLRZg

8NTTxZ0jG8ZebIgWQG9pXxQR1GCI/D2hRSU/aT2xwzvK5efo9ME1VnyHfufnRfsr6mh1SD0+bbFMYLX9

nI8SdUEXjlu4DdWg8VFPaDW1meynon6DOPAhBBMQVt
4b4gKydUx7hcIMDiEMs38F/eNl+DX29ZkxcVpR22Eip+sWgYrJpQvCR0EpeJFciLYpu41aC4RSEMkWoi

P6kxEennzVGzP3nn0hQQ7TZ18ZyjIUcBhpu3PaGsWXU1Ylvga18OfMFFKUvMKFNWEr5ptN7qMCSSyoz4

YqqbYS09em7z6rimLLc3InC24cUzk3aR8UU0ccZjg0
AolTlGHZqvopdPJfhMRyHDsBs5lpL6wsFM3hzzw6P+NMBBoNwmhn5g6m6oIWa0rCQ2z9NVcPasj5nK97

yzBQRE9s8Ee9jOgyxuYovsLDqGnFbuth/j/1uGG0wW0Ddhk+9xTDpwts+g3odJ4gVXxIbhpTC3oN/5wi

4pgw5N+BcIie6Ye2U81G+6KIOfozL9mtN9UGZ5lBGH
vkVnnuIKQlg69WAgtMNO/XRV0RxNdwi1vvkzzb4fdhvy9Z0RGg/YSLgQl9GbdbDxE5thUQb0b9tk3QeM

f45AcGAlCEWol52QITG03Z/58EPNVYTE9B1hrKmcad5PCmMT8qIXcQVMgEQi1GEQpECs/SyqhU3y4rjT

ikpNfz841pF8jFh67PHGfzJ4ooFCjeTdXKl2RoQPCI
Yg43f2EK6DOnU4okkEP8Qb8tyQKpyLYCHLk5C/IqULs2j1tTRCZLqwfxDb0svtdP7UFlRWt8FZ/c0ERD

v78/4xTa1l7lCooG5/wgteqT4G6W5NuGHgw6EQ5CO5mS7v18gOF2d6twKV3F4+07XtKwxveHm1w2vhvK

QLFKLdfDN87otCx1EFsVnjaR8Japm0uRJJIWEOpEgy
dIK6MigLkM1vDuGWNolfOYGELQTcAFhUMU2ND9dwQRadXmTxmoBeMk7k3+Hpz29/7NYc+VMhBuAxX3ZS

vX2IX2sIkUAEqxEmuMALtn6l2D9CjHCY+Tnr81gc4+0x/2UkUdZXPDeRqd4We4ApZKaZhW4Giby6QekX

t9oZjg/4kT/iFG/OpuKw1oi50nFjT13naTfwsqocft
RDl4QwQ4AIHJw7wmXB4OnmmGuQ36gxG9zrhe+WKY1xgFsq0LIrOSymdECpNE73cnDiPcMejKXQ3tWWmm

2KOloy+CMl98nXE/zVk1UREadBnz5j+/F6SBMGbnDpUUAeWi5phoRqg91/F5MmMbRpjE87dBFPovxL4i

Df8m8bIUF7LaIQj6q7zc/+cb45vOSfTvVm6kZgA/1R
3C9Ghu93l39bibH+zMVJIciVMMIHpMfSaFd7Aj6NWrXXnyhQzcDKLoJCqg7y2BrQUKjKbf24VGC069hq

cUIdJeKj91lMHlkEF8JVg6sJxVAG/cEhxTnyL6mU7YL630iDSDywgULvKEOAV9wDp7Nx1rrt+dB50nlW

SSaj/vJQFrIQl16YYNEsPvA9Ups4zBUc5XFkncdJC9
KrjalnCs1fF0v6dM4dpFEFwgGZsXvh5Z+8gXxaHTBHAELqT8hrQE/cj5raYg2e9AMXqZ7hnXL9z6UQWS

0loygqM1Gobf7NQiiUIIum3DtIdwm0Xwyvfd1qTJhknHvA2ffnXIhm1FDOGvmULAFjGbECQRCQSZoyAh

tKddjOm7heRbm7NyJRzurzS8TLwjMB5aoYcArYpufQ
u+grYg/iWJQoSG88/+aIQuT++D/2QDPWMONnMkEXHVFWi1uwM2FD2Jp0YBVNg1Q04Z9Ee2C+Zm41SPap

d3/Ufq7foclP5FrYQzKF3u8XFFNXk9cdXIOHRrqGh0UkW1wBx5XD0vmDInHFhMu7Wl/XuCQO8fgH9h+b

fn6UGXsFyFPlQvfwrPvFmCOlvlT9KyEEjk+aiof//k
ySJi4jv09BZnvJICztngus2MoBqmeaZcSM+eaFpXas2neh42C+Rii518pAguVWlIgl0BQKlsIRIzC9dr

gjmRR/J+/xEC3gRil/4lK4kjZiVOkz28fm1618J5uv0846VleE+XaWpYhXI/jBCLvBhxGup+eDiWRQWA

O2SY9hPJY1sBSjejix4V6bu6+IG3al2l0fUtLv3xBk
19U5FwvnUjwLQ19Q6UU7qZWvhlj1kvfjO8kpc4/7Eza8ItTuK/bTMAR9KUmlQlY51yeQtBbxBUmIzhRO

4uVpxN/18UNi7HooWQ1owwwGYqbci4hQRo74R5tgo7PAC0s6b8rRfmty5N32ERsF03cA0fnza0ZOhae0

5l2b1XB99YuioIkbV91ovDJGu+D09maAnFun/mJOtH
qJJQs2zOOhqnu1VfUubPvxejDLCAHa/m+XSznQBkD61mBx4BroaaiGl7h6V44D25cdIiAT1AjeDmMOLp

ZkIDEjWDZ7OBvmPOP3gUvEqhEEVlIhql4/5bgi+IEL4LbVrGv4FKnpXCJrZYDe87mGno+zLDfEq+wrTM

JVEykTjawjfn/4e+p7jGSUWUixYqfqi765ESqD0cfX
YYHlutOGs5bWf+9OpGonJx1PiM1cVKqgOuw7W70e/oyXMhn0IHGepADkOZNFnYdyk6oLHtrVxdhs4Hs3

BMYjTliAVnakd/369h4cHIs6wWOC0F5xzGqAMU8P0Vl+LzyY/kzFAtqBJpfB4j10xWGeBiLE6vj0avys

AXc+DovJLVPY4R1VA3vMhO8GmMVuVzE/wAYE3wtKQE
JkvZ+5olNH+OyY8M2NDo6i4PICljAubgGHH2TjqC/Xsqwl8BVwXasNo/q43h60vE7sMdkzvSiTI1Vgcu

NZ2bHXRQ0+lPdtXaQphXYXNVEBJZYWYOHRhYQ+EfzlaGDowr3BVvJfiTvuVI9xPTB1md+m2kuEGLD46X

v1os7+AzOf1O3qb4GorA2KvHnOVmjqzxdwKGcnpvrX
N84o2S8exi6/ZS52CXAjgRctzl7ZV39wSYl4DROnFGrfa92oH9epF5p+iZS+cSeiNa5z6Am+AryZYcPM

fk+u99prrTitjnFV/fzAMx0nsS20K/LBnuYtuxbTdEdhJNIEAah5+rYdNmLqQJBwhM3C9oAUsSrGy0Wn

mXPXIenLcXCKU5pr6hnLw9CnU2U0Bx/Kl67g6zLd54
DeFnWzQlvAnGYrcca/avPRUfu21nlQ1w58w2OKlXCYRrgxNcZDWH4lUqLOFV5i/wHABH9sNtbWXZ4RGa

TEqopji9tbUXCw4RkKfMP2ZIPEk+CfizSH2/cry/aT6Xhkbb6TvuIcCRL+66eQZ1HRErkXONKOV1Ck7w

B/PPpXzno96bFBd3XmqK8bQwUUy+feACikxMveFIcM
7XnJLNZEj9BKMzxUGdgVf6sXztJi7w7CI0GmQJxG8Emaca5+6QJLTy8MdMSurS3w4Wg76nEvvKjB/NGq

rZ21Jq+91LFhmH1N5xamd34VFJP+k50ncTlpXVbo+Ctpn2GJNgM+cXI2nEC1GZWMEM6BNqntMgqePqJG

UJCzxzpT7e/uEv47r66CSqmVGdkiAlHCKNlNphQtkN
w6r0NsVjp0KUSy4GWcYzx/zYitcqVxJIih6l1shKDAjWzkGXFdFYSqiC+3vnqUmXpcJZ/7WXBQqJNSEZ

UVuSTmfVShd/HeyNZrIuVez2jEzbwQL3TKP/viZeipVcIbF8nHGvStvYOk+1SssyptE4CoTM35evZd2I

EY7rJk1QP0zhdgRv+nC9VYwLVJTPRs+1IWd+9nwC6Z
iqN2qzIdtEzJadPiHAKYIwkykcJu4QNAb+wH2681wsroP6UWGfiW7Y2zmht2JB/qWPW++7wTb+VgEuvq

h+ZupsPBgAhc7kwRknX2m94/EMuXsPe3zznS8EUGSRRQ65HlDrfiQxbI53nCCAnHfqLgZEKICdRsrKrQ

yP1P2huNUM+uCw2kpd8jAE/yXfY80yREiJ7NlNE4oF
yhCiEbqj/ZAs9yVKNuX9Eiabn7skWIMm7WFJj2FUALMyQ4QkoR2Ba1/3HL/H57VbjctmPFxcGNQsIPR7

n2GFMVr/DkOYWxofc9hdt9i5vs2uZp2HUoTgOs9MSBHu43DZsL9RVzT+sVftiO9xRy/FCyHMQlQHMN+K

jp7fE6hm+4jj9OgR4tpiylULjLquTscSAP5IN7GfZb
f+f4TkO48wgJfIoj8UJqkQEiO13mDJJOFmC6gWUAbZnHQK86qQQc3NuCbBzA9ud3dasQaUwxIsJbzY8L

RT2r28tLajVpmwa2obgVbTNXjJ9YiaWR0ZR7GNCUsZ4suqilgnSU91/4F651jWVVifjKgs+FmUT7z/ji

UQ2hm5xTh2qTKpL9OrPlXziQfDtXxgq3+4sdRD3fxs
5/UbIndlpSXa1UyuHwE0a4AX+rfUwWhkj4NlARAsG4En5JeojH8EPcD01/adzBnRJ7Yf5jiKRaAjbCn4

6okzjetQXTqJPt77y3nK5hKukoNhF+Q4c67i6HD3B9zuM7+LMaf80cTzu9TvFXpEVZ7WFERQdlPH/KAU

Vx6Po5BWlvc6k2OB99iLc/J5XG2NFvUeDGUAs8e3Z7
B4bc0iHL9zkSJo1BrdqoX4bhrznheoLjHpqb4N913x0gnZ7PwDsi2o+TI2MZEZqfsw9hPdCoQEg4OpQN

M8nnnlMmtmU8fErRQ8TZP/78PEKk8fl7+XfhEwyG/r2GnmZBmmgAsIZcKHFu8eu73ATunDbl42QR5Nn1

YlepzRx8UeVqas/IPlBt34yMkFOLIcgz1fHEGDCPrF
77kgFI42DtYA/gc6WU18742CpLbEOl1CvmSMi2RjDBPK72zZsEkNbjxiia6VQZEB3+boF/1bVpZY+t3U

PqGfYY/kFNBetLM17p1Q2omkoK2lOqTQsb55dAxIB9wXA8b5visA+Vz1sVEfPkHMYsm1niXbHwEoCPJ7

OPKCmxvWK/2GqaKdGYFc2Rp9dSA04sPD82neSU6KM4
hNKKvlP+1ivNqBbHy/0kt9o209jIJMKcn/cTuu3dPLSquF6xd8gwBY0nQSgFGYTx2wk59k/A89WW+pdt

9EbzINaLRQfLZJ2XWWmzjvt/MAb8CRk6kMyX1uFiiHUIkQSzv6GxlosAZf/eK+uu+6NRzhwK4kqmCml0

LmHuGGvzFKMahCy4SYPiukBYrSpOrfuy4HKMqP6k31
nshNi7fKar7DZgjKKtW+DUT1NYI1q6VJW8v3N04Ex9d8skiIy02+jq0Y4QjfKu32o/TM1A3r8lPiUTpO

lB+SGl0bUyAKdgUPhafYbi+weRHfPeT2hzq6IQNFnKfhdYSObz1CWZboloA+yfaKk1I0EhMrYHy+M0eC

+0RjuFfDlfEfjxyly4M6/kWqZCk+ousrfXjQzcqv19
Meng/cNHvqgrzizhQZxngWd16plrK7pLjGT3U/eOd8AtNGRhkusER/9Cp6wFflAW4RuvX9X1T9MHEFW+Oo

zR3jL632WgADe7vmI7PPD4xDBjQfUz01TjBGL13I1ATRZAWQ6uicB0NJGkRikuqp8jympLIcLNW6oDS5

M0OnfyBYryph/yqvblBLUvOV3tues9DARI8PA8yHd0
wTMnLWvhWtT6HzsveD/OlSPF+XVcuYge6w+BbYbqKPr/aJty+7CV4Ecd8KdnbnG9+c6FMqhYv0Clrasm

zpGaYrsWL5+xygf3grpxDu6LfizmulRGiz86D/79G6llqaLOYUcLX7xJ67ka8R1eyvY3d5Zsw8M0q//S

IXamN2D6UVkEGTJP6/iWV+LarYTV8ud+Ed7wwVvaed
y9OnWSesa8lFukB4Bei7YZJYMIOIRzJf272eVPtIsy8uWEH6hodMSt1FXaUgTXUrmr/6lUpagik1/74X

aj/ALxKUGEXyC5k+h4XJt0G7yvrYS/eYf2AOezy6WjzGloHfUfdxms8aopRXQLroxW8b+rbg1hFhOquf

oT6jO3REmvV0+X9FzI1+08QXeHA4a7vN5sUGj5eNAF
1NVS4j8xcejZf1OLXz6nhGGvs07MxJN+waKSX09z1xCz3gQd06X3QfpVWJ15RAoZlfknWHP5lh+L0fKi

mD1roGM70v/mB+XL4PmrvNDrlt1seOtdKSuJBR60u9RZTInoU+m3Y1yYdBkq4gPuxv8hI7tQOPHVc3eZ

KF3zrCVNaaRWetG6aDpeX+p+xooXZjdXSNDVt52FYc
IRRksixbDbbi2lv3WLiB0vp+yB8fdCngn0aU18CzKbGh4MwxOHGCnYkmYYXZ6CjN1Dp0g1Maez3iiCzk

iJw8M3hiLOHxyEBafLlBuUq+WwThuB/dJ5w207E7owQU2TkfIooPwkf150IjzvMVT8YOVPFvLJn6k+in

r+XkJ6mJqArsTYn01iRdWPn3inuK71830YqieNKsyn
Y/UXGLCuhTswPru8vTJ+htav5lymJY3vbmwV4ukcDyUF6rhzveNgmuQ01iIKh4fL+sXV5puBaEjdLZsq

xHslNhigrcJfKUyiYVaum4h5pL1ka7tCupLd2O2mqS2I0+iZkbBQHCuQLMK0kxL1+BxwX4guw/tj9EPL

MpyQ1/es88SjCfmPhUDimST1GjV4tdsZRg+IWDEVWt
QOYyY+YiLcyU5pZa1EMH4CsAF8C2HxC0PPQI/O4W08TgYFvRjZlbexZETlm6rrbSA0tjnTVWOQUpsq5w

y17aY67cqAjqksnUYwHalEUNqFlrHAsaUi9FPzJjBRm3YS5C0K5B0Xk5dLuNe4t7ZRBKBMsydKBQEljP

Hsf74wZH/ekYhyAFUp+TzpveJuaPizblR54u2Zc1Ey
2Ei+902Thg9vbvxHAXe82xk70VnWH7dd+bq2DTDOQ6j6/Z0zf1uGUpiHhbj7ziVlvllgZ3pfuVhA4hs6

DILHZk8sS9pWUb70zwTUSJarw6dI0kVWA4ky5L6U3jp0x9Ixj2RDKf8MISuzVrx8q4kjRec7m3rSfhwh

ypBvwyRRl+bZmz2+/kBX6zUHzbIsFR0wTkgXlVyG9p
ncI8iZJYR2MgEWLIzwv6hA1ONOckLvuUmZRaqjv67KrdqS0MPhgFuxab904nswdXYWqoLgbN3mdqtZWH

ZcIPeBevoV5OWdJumfN56d9dRijmA+S4+1UFHreeYiAnJ15ieiLtUp4wwgk/cYhpVwiPGwp2dnj5WSGo

I3ZXdYwMj/AqUHdRhgkXADRvKi4rYxiUx467BRoe0a
GlmjYps+qze8pTM86m0nIwq+6RqxkgN0zMimZdzp/7qMQ1bRbkUCCTx6pykJQjLVDUETPr/tGLJMMlec

uT/QKkSIII8bdh40F5bdr6SVDIBnxcP/FcVWT+nVPly8iZ/QGsDL5DmTom1Horej3CaxN6OiGwLSxKK4

FtVjaci1EMBTd9mWzWhzwOXbN4NYcwRX/rv2RB5L9W
vYe8xDH5W/ve8uyhHkiF6btzUY4C72guxLvoHkPq9TC5cDUCe1+avy2DP0yuVXuLb6xX1YzqPDg7VETq

wcCnpO87RPaypxJYT4Nep9hHoG63GrczhWubnjyB0Ud4DMTYg+nLjAbnMqDDDQZZ79ck1GOYYEbvbWJi

2zJK4pXyeB/aAZzj6d9qo+dM3qLCu+AFDKdsbPvx5T
UPaoHN8D1O/m0Q3lpemTxKkKOwtsfFyTl/nUQbVhgxxJgiNQM6aO+E3oR14+NBepQho0JjWWPnppgqxK

gfqk2F7/mcYp6KflazreAfqydz1QqfaypjvSdxux/dj2HxhTl5v5mv990diCOaVChyjgrKDJfJsdnumN

tstaiwFalTDt9LIema8grRr7182uhGJlGGWwjsUevy
fPayz6jPA73D1yE8nddIQQOYgNm3lhn9plK9o9v59sbptVtk9m/+WStRSJ8N5dNtYyPsJt45Bq34oZb1

Sbq/dH/btGuugD6QWxT/q9o6GFgw8aMvg48yIFek6x+C0++ZCvMAMJwq9PT9T+4irQgr5vPw2gAiTe+u

AL+uPYS29jxRPeObWmzjL6n1IqIE+mby8Q7pA/+zK+
5dyv++jvVf/It/8S/+xb/7p2PQFtXJYeNITy2PVekbJPF1N5J3pjrUpOx6xRcaIMg9DFw+YypruCaJvU

NHzkzU/qDet9KBv35bMRbhMOPKgs0ozxBaaj2pHHEbS6JmVV/VZ8jgOkw2v+0gm/EghpyNbJjHF+s6BH

vjSDzIOWAu2R511+vwZ4Igd5Od2WmJu8d3pouyJ5df
swp81slzdJSwfosadqyHGtG23Lk0hISx9oFjWyfRFhMTvZ7cC7iKD4CVlAhgARYRRdp6o3Xd8H/9zHCJ

yG7E+sMXxmVGNTvvhskreNJYjePdezgDkyEm82O0Q3GdhVxJmM+axvt9Ndw8cGrJk4PZ32kavMQC//uQ

6LOgAskhZe/h+xXCVQ+Kelvin/a5GmZHi5XkxV0JHVNBl
aTw8xJ8xC/5i5yxMdZBh4i7I+x9oXiiPGuZFe+MWVuw+gBRdGDo8PI507DH/cvm4pNQR6IU0b3/YhFxT

kU4hS19h7Z65yaNR5s9Ba1zO3ODTMqW89D9mJAfCYF6xvDMyr4hTMOY31L4c3hojYa+VgZz4J24oEX2K

bsT0gUq/048AaHEsYhXsiJoRKiHEt9nSZL++VjWGvz
JNOumbaL3fVVY5Gnfv1fm+cPgDy+uZGcv/mvxr2u/xDGU2ueIG2qG6B9F3UYf7qXvVNgB2b3o1yKWoQ9

hj+/7T6xPsm4M7SQYCuErRr6ZhbNQgqVDNEatnOpFe/Zxy0QeD+Pg1brl+6foQV2xvvIIzgkh9QQinEr

oh4ZDBfksS5d402+Ro8gi23AdQJjhNCrZ2uPPLf5X4
s6oh4Xc20EcAxyrdBsYq2T7vuoG2WZGrz64KH3GMPgNyEdnqBUYhi5wvKSRq86RkilSjj671AdTyuPwh

a3IHJWm17D8aY/QMUyqhqXw0x2++zLaxYth4ylHAOFMLKwuEBqEriodR08pUPj7l7uiVjAY7TobFkDuN

lBsNzWOcnxoaRnm+XV1tNocABgnyIDEFDzKLumEml6
+yQL8orOClw8QDCCCt4t4EXPcrjk2cpG66qDkOkd6XlQLFtNAgFkB8y3KT4MEhDIwidr8N9mx1BfDej/

3dT89tb+U2CinLupPlpGXnKvQt/5LWaI/Rlzw22CUgiY9VipBHo0htLR9pL7m1A9pBvQUQnd4iSRT2KR

EwWKenN6GEp41anecqg6axwB3OiF70mArjjTwQy5Zn
mTi3/rnZmTh4/zr7e2KOVJL84uuUul2lNBzk0BmRUd0f0J7nWst3jzVEWEO0r5CuL1kr+jAXMwxm8aZW

HYfFxaMwNUGPafV0H2f8X9wC/hIWEUlKLCBBVKDbhZ5MWwgwkQIhxsVl/YJowrGmHSlM6n2kMj9EuDXa

usl3c6lFequ+XmGH9inuvBX3qj2q0eB3yvvJZfUAHj
Dure9YWSVY6YAlvkjEtLFmR8m2eSxOvZtttK43FWTXju0mk3MlrCjsD3w4bRFQR38i4Pg62gsx8YZtd/

H622akvCFp8YDxgxDMSFLmhfpBAVM9xk3vsFGk59UTV6hFG4ues282nL9sUeKjZuD8z04cxFFr8tMxDZ

RJdhHG2cWd1+Jf8OHc6hvZhMlv0GhyUP8JE8Gh9CNU
PBuNBPcdvEUE9ATqzielE8ylA6pcxdDI3ITwbLqlZVeJ6VyQzzfrKJEV/gFQNLW0EpHnIYjZyuGpYzlY

WyYHVqtmvbDdsih9iP1QuFUzogllG2aIgbq2rHgAoPHoJxwh0p7yQhfrcRwpAdGcGHmp/B0DfXrmkuL6

3RIzws0cbAe452aqVgva/vAN7GpG+AhEUuuvGaAjZf
kidzmZ4POtkjxhEg5MyOAlXNCaj7PP2KNMwbt0BOsBUyhdJ8Zd525FBcN0Q8a1ZfYiPT8gMbYapQvQt3

vkDeuTh1zLvwAA1PGP29zNGZZ63IxUyZ2iCYDb1gqyWB0kQuYvWOtN79GekuMrT1GL94vaYy1HJO88ci

N2OEDb/2XHl+NqxcH2geC/zdzDtHiN6XQ9qk6t9dTH
pXPokQAYGBUuA7sJnLMnj6mvh856TgtvYI/fniFbCfkORoj7ciN5Q8xnmP3Aws19odTHsEstXsDjmpeY

+4BWNMF6uHO5x4F7SkW1FiaTveB94/OznWV+AjMIKZ75pxJc/Hj/qX25+zmuCvdx5HWMhBNYnJwz7SXF

uJnkCYXpTCqIxkiLNLAuges3Zw2otQHXo5nq42N1KW
RG2t9nYHH+qghqrJJ4U7Zch6nExpTtcHF93YZuXG1LFOCCYSwMqvRPbP7wgO5bT0FE1ejD8yZ6xvKNdp

/6SYWag9oZAQVLkQ7i6YthZe0R7IK1XSas7pDuibZhDq4M5igZ4tQuFWuF6ByKpxCkPi1T/iARfATiwm

E1q8c/t2WSpBEsKu+/O3eE/t8PY1xnswZBS79rcJEU
e0b5DMtwcvwdYWc+ChY86gYQnnZ8neGFarf8OokCp1lxZy9qKjUgPmp+jA1hY71ATW2OadCzC7VjxEF3

EgysiTgbeSwwV7CLAJM+84hPmnBCKOBzdIFNpat3MatIUverlJZLl90hFLZXnkvd3IC9mItUtkctd/DU

X4gzkAKGiAqlSWuFcs4LjLjOmywutNo2eooB6BP+JU
SawPMozJO8wqQWP2BYdFnAvuwqeAAMyzPW6QxsM1eiBckGcDGb0a5KPR2LbFbF79z1gOMLbYGizcQMLW

5WfSei4cSB+2LQ1ZWlOpHcZVhKi8cJdUSct7tqe9Zl3C5afoNLCycSdM9UVIxgeGmEu/fCc2bs6/rkGe

8t3/bjvB/w4PkdpZgt7/rSs/7peuPSF7M/H5d1gVXz
Chi+5erX6XiHmRlV3b/+sJbPR/H5FE2f5HdWSPcNYO5SaxOr5l6jS8G/9r4f1qsdiso0iN/dbK0J9SRe

/qPgGnnmQbGdEnE53BgBsKL7r213KvFNHgipdo/+Vc8V/uX+5f7l/uX+5f7l/uX+7/W3zVQTTZv0rntK

+jHeromyg7+EGSG/Q/50io5VS/OdX5cuTrW8KewWSb
zqgzYDi5foE9QCSTv4CRogJN22l826z8QIeXvVxfq+9Lnlg6MRXNyZKFCC0F4/oglDeYaQvHNdzu9iCK

CzI5ClZh2hu+x5K4ogblMNIqqBzowhjh9UxDOxYhUOP0onYDNF0Y9c9TgGqeJ3fsIlEF/NNGNfakwPuT

ADyqfJVXuwy94ooYM/vOrIX5KxLr+Cg+doJD50Kg2K
Ydt04ls+oV2ApTVF7fveSaq8jwzYpZZUtPqspcP8d+2I9VFnCkolwLtP37Q7SQ2YE6o+bAmgT8R+F7eH

6a4eEmBEvUj40TuAnivKCHCfB71bgDpD0X9b/M6/Mmmd74qvAU/ZbqKL44jvEde84oQWqfNGBi2mCcjL

86mk9s3Zfrl+Bh2CHr7s12HQuzYKuPXzPVkJF50E/T
klKzCWZ5hth55Ub6uTXx4qzAq7/8Tn/fJuLa1NqqDWzt717nkM05D4F/V1Iob6c2QQaVdG2efy2HU8+F

0SmvdaCZ4o5DUK2EiFEs1sgbeXCqEAzUWu9+1ZjIVHvmn68ANVKc9Y/gdoxznWuM2t3GvCDt/gvP5P2R

zgOUtgX633PcKTtlLL/0PC5yuTkzZwk30cUQ+NTNww
1AeMGZX5kxXmuNK9DR3vKbmi3qb7HkUEFYfDRj9R2JezdDt0dRjWSwGaG8QNl3fRdG1bpBz6E8zMoeOz

4a4KEH+1ly4L5I8HxEfMhSUvJOoXaqXen92J4/xyPxG/V+RfwaO4NQ56sPHzMZy2pcb7Co5G5btsDFOz

V0OwzhOKc9zZViRAKzKRHUm+JT0SCXH6GyRXXTlcff
YM2MOkTydJaMexepE2R+8In3DX3g/A/e8hkIYrmy29sx5BrMPqPtpWyvbgRMrBHE7+2CF3iqi0hQDGtb

+qxNJC3o5UUMcwVHqlugdyo1O/4Kveqk3UAPIdsznioVxygn4ebmz+7zEObHmPFnE+6Hikvha/Qqn6Q9

qbB4wySREfNT2nzxf1WWfel/mP4YLB1ZLJUARxcvO2
3BM8Y3KB9KWBrxPOh43SzRzrrFlLMM3zJ09DSdbFY78GZzpiZHh8N35Ge4aIoNUg0QpFms1MQQ6hyXCQ

xhnjicb/6qP4DD6cP500q0aknZbdXrIDKRE9M/qzq6+eFbOuFKok6b0PoYDlP5RNnLDJTyitoHR4YHuc

h5Of5PeqS3sSNnqGh8zl1BejA74XOPeBVDdunHXyuw
ezENunUt+/4nrhqyszXA3Czea+qUkh3l4XiutKrvwsKcOVaXqAyV9UxS8R9KMdDU3vpdU/dnGDZ7tQQk

cnT5Jjn8Be5GQyfHrYn0aIs0twvrV7qHM3S0FNlECcSweiXLjB1fFbkrF3r0jRQpthElnXU8qNwgasSC

WeoOQuZvn13sDzIYW/3eeqjXXjzHEL/nZ2rOdmR8Zi
qg3ORDspjEDS0hd0NVEUj4DPNGIomowRc3iVtl9/87K3L6PI0EJwyGu67/STVNKeIRpRT7qyxeB1xwFn

UliSg30xnhbAREiudY4secLlyEjdXvubh6kHFot8UonWVWbtg/F5oQUgpzMRm8qyEAq2NhCyFt3KO0Vi

kW7f0zWA3fsB/a7+/wHGx+t2iZeG+YlHZ51BI+P0hR
gcczkuyLUQquNqB+w1oG0KES7gsVYD/idxxRQy7Lj6fW8eFuwwmKyQrGRdzeAjwwr8Bnb1zuOAOqcfHJ

MpHye2YycHXlNWIzPCUAzf1IWePdnwymDsxH9cUNYhSKuTJ+H8L6PzmIUk5pYaOYmLmkr+mR3Cl5KnaS

srb4QNIn3JbDZxgUz6xKRoXYxTWxBQGdxFqvKVAEWA
HEih7dD4kachwyx/lwllRgaZ4Z79J3L39u477Y2e/ifcvEqvCaXEhhFeyBczTju9eqwF1L+re03Yu7Ia

r29hf3PzxNHbJmw9Aqp+ZDzS+o4mhDU+MLddplqhSOz/BZgi/7C3T+c3Ixr/mDTNUIXkiKEiduIi4cDp

I3zA7ALDARUDDNTOdVNEBlYkOMAV1cO0tphKw9YApu
tNcbYSKufQzck+3c+LqH/v4YHv6mexvhIpEiZzZOMSiPLsulwFCdyy7WOwaU1ShBm89w0lo6CLmJIcoM

QxHQv/3kEv5c1nnjl8KhliclLwWvDGPgIaR6uAkI54XmdVBsWtPTyCd8607SL1SK7YUt/1sv5trCcsJ5

Nka6OaDbff0G9I8YE47xnj0pk+se3CBNffWexPoyLn
Gvwqe6z5FOqQoyU4U8SHFh1E1uMP50rMZ9nIntvDGB8OSmLGvb/fpzN1hWLQWfxsPxOYvHOm//o3c2R6

I6NJItg8HeCtUHJGZ2a/ZBEKnsbmQ588ES1cSFz+r5R0DTFiQ9pz///hn+inTRnbeOS+LztcC+kfUzq7

lTgOY98PR4YH4qctpaAHF2QdQimDThUE5fEZffdaBc
6M/6p/YZlWcGV3ZjemtwLxUW15uNpkzlAka+4kxUcfn+3POV36mCFGfPkIS2iTm4QFZqzcdq9FT34MWg

nBRleRG96lNS7/CcQrzdtGQb5E0TkYDkxs5C8KFup58z5jS7u906QvJI13f1yW8E1qlg1QCxhuQwazHV

YdtvlqsHmq2Ow6GzoJARaBL5N9ddtCCJdFfqy6dXOs
dFrUFdMVXrf/PrW2rTzRzAzTJqp7iS7TSefnhgVVU+pawYUiZsmgnynSYyIZbVvZaJ+5+tnd9pv/9eAW

BGd88i6RKA+iKaxnxXoORv+/lciwjxRpgJ0VzGvmd51QIlhWW13nik1+24s6xNczprlsYQW6reKv00Q2

74AEN5Vj3d/oV8UU16IsP7kq8K+rToLUaB+Oq0Dhu0
7/jyvV74H+UZTmziRS2mlRnlTN8U1h+UDyDxzUcpeGDb09R6KaPEHw6g3lix62GWCbtw5tz5WVRetvsV

9L6fIpJQYkqQnaPVoh8EZhV9rH6HcJQwTJ+Rp4Gny97XXM8AQ2efy+qAItGbVwl/TzFutWcClUrfZldX

lvRWjHkh+8P3x8fx9Wz2SAiJDFYnnaUREP4DAXItem
YBs93YpoMQRxiDHKfRqYV0wp2u9vD8lBcCpEKB5lMP/Bsx0xeSr4QuTk0ixu6hjoCZUeFz1XM1WCv3w2

JvikQJ7vJhp5Yq06r4yaL/cO+0wghA8OmVGbagsIao26gI/LViNliakUkrLFzWeM3AKDNWy/wH2HsRCi

06kTFaSx/5WXlh0NCJM1msrvXZcuZWeX1TnubbhJb0
jITH/ZVBvlJgK5kgmodIUMf3qa/H2sYjfCd4EYAc4rszSLJD/VpGtgH/spLgzYR+Korwjy3vRVJa5m/Y

RJ5wn40gdcaXsg3IBUJvOWx3MRgpOdkaqtQ2isrL5djFiYadNtiIofYS9zqgfiqEH2vSTsa89PtjAeMv

WEnOv++YPR1kg0pmebHO5memIlG17dPtcT6iO1Vkun
C/uk6NW+1oRj1Etk+UuQb1pqgOyLyN+NXJEZxXZcyPHG7AXEhNbtLq5MVFC0hyilPQOFdw8cCioTlFgn

Wz9q1Z0HJkAUdU2CfyHII+dDokrGQ/GRMSlHTY7SOg9NHkjSERD0XP3WcRN73UpR4i8h7CVbuNCodXrG

hbywuuMPd2XAtW41VVQV0IvcjO/nRUPIwqeTsuj5er
254MPZoPKbtTmVALGvP7rn1s5yTVSXM5tXGZ/GnHugWDmbs4Parld9BR9QdbNFHzrkrwsHpTrQBYQjmy

K2LXorxvHmMa1CLgAP3ZXiQEMPauuzv6mb7hIqi6WcatzIwc4gq4Kqn06kbnOCMQRxKkbVQUwS7FA+q2

q//zJQIKXbg1tjz/y6XP2rjjw3UFfZlNpUZgeTWP2E
zxQVs4awHY8+OW2C33SGNR4hr4UlN8aBXIAY5Im9avlYVmSwAnwBGl4fpwkQmoczwPiUtwsf1kSW1SKE

Umg9ExUSKNe7SXDNTlwF3yFfM5VajZiAAY1W15X3HKBbPZ7RK/vcrgcOZDONbgQ0LboEMVFVVqZRbq7u

6Oejm+IJVHPKopNFEg9F68XcxsyguDYcWdEiz/0rJ6
SLP+HA0qN7HTLtV+/qp5lS3F3S19QEYHuMzb+j8A+vJxl9G+Hze5etlcgDi6fMk7p8BcGQB574MPy0aX

tzJjrcp+wBuZrrJ0CuonTO80I361rYUPPt8JwuaqD2D393V2wNLQ/zjl9KecqrEZhzLgLBsd3i0T2281

TXKJe1a8grl+VS/A/FrXfJ3W4/XJkgM/CCH5uenSOn
fuJJYp+zXNMACBwd6mtC1tAt1bjz3tmVqKYUtMImhvEVVDtIuVp5a40f8HHKm846jsbAxYk0UfWF+CBJ

89icKpKTYXPY0fQHc0hOpvA3K73W9Cxb473nSdfQxrihPfP8xmXf187DcYJJaDxdPmST4dPbe8ca/u7P

ngcDlwkmm06WtLBAb3PfFmQkGhCuAyf+9NSaLR8uWI
eUDFIBUx3/4A2OzM+ncAOQdtmTX1Ktfj+9nhyPvO3xk8A1ONNmxb6CC+/qfsjkH6ptYqaRH/oJbu8ffa

KYgBtinm9jSUTG3y6LfgYDZXZHkj5ayNPJ9Tpav6QLvpT7Jrl/YD27NAmjbeKsQR/f6FK+NDwG9idVXZ

MYm9Uyeba/XOYEljsfyq8pOPlB+a3oqZP2TJK9W9Bn
pqM2bp9y+nxQt/c6bO/ZxKahqdkloMgHIMwQ0VTLLJ5Sro1suY3EEcnA5Y1NpKqcQHKJ7Wvzx7++8aJe

rgGxnaVK6w1Ks07c7fDgL77EnZnXzgdhXxxW1eyLN0xdVVkHPnhmh0XDdSHuf3U3XqC4gl7Nsfrpt3ci

070MEZ29rGee+Uf9lOIQ/tWeGcxepjoxTWR+KVsZTJ
KjLaNNGs3H5EHWQHrXdD8qZe2nTsgY/Qb5RYPseqUxhhPJJBMWBIEQHwZCtmoCVksFMD0sbBsQeaCOnI

gZpt6EpdedxBThdJJhj8FqdD0YnV26bd9p2D5Habda4vczic3Q7Qus22coBWxkgbtIowE6CumKu894BB

qJpq+6KKyTKIMDPLkhdr4GF3UKqFOzCM7vXB2oXo3p
j3vaHJ+RO8fdkjCcWvdNWug4pAYMTccaTK37h85zZE9KVlegvYA1vs8XegKzlTInKCvN1FUxz386ABiH

0x5h7xSauFPfEwQRP1z5HsaM4g4GSBT4aP/XOG8HbiCIUkpTg+9wyoHqyGG1gRlzgf+2quVxgH/pTJ0r

gC80UKsMzODM8L0n0Xkk4PEorLtPiGpFReOuBNdnbl
mhWGtqLnlIJAPPixz24qvc3Uucg0xUw+YxYdvncix5jOc7d9umnrBLBCNneES+MkXEOMlutbetaYnby4

6+PHpFc8xCMRPOcXbxImwtHkpH7B2Ox5mQ+6KWPaW1XCPHpZxa71MxF5LJM0eJxlWOpDQDp/cjJF3NTw

va9UDCzmOG42t3QSiEPtyXehRAnjL6D7CkzcxEEdt9
138/zsNFXwNPjD74J/48ZItryeCz5bRxdLz4GC3LgZupWyMxL2bIkpepgLT/AKfs9kBNSlmdTeqYlCpD

srzO5pf8Hehmtfd/EXdIzVbd7ZBTD4CtSy+ockYcx0gRuVXoh/dwRmVzy1JKAPF5ney7dOHSCUKu1En6

bgl4n2NHUlBxriSuFR6CAM+E/uxLsrlYp8D0/5CarE
dA+v/VPtmv63fqkvvjADeNKIGM98soXV1+yzmggL+jvOsHqAWARf8UIRL4ljFWoTjtAKbTuaH+lfDkgg

6afHLN+1x6qIZ4z1eG2URCIMKSbr/p8KeHr1huioYMIaKFRrK9awlEXs+u1jhfibb4TZGou3sVPg+jya

gNmEacA1XSlrVpQMgzidX2wy8iIoUYkDU/53NJ9rgt
kE4cZ8V/1hbat4KEvZoyvguokjFa6UK1STklS7n33GWLiEZfiAc+oENzZKPCuK6AUv8haW3r0QdbZuqe

yYsFY8IuFMCj6znAHCQfjg5NKiVa8N8339+t3PCKB1xbRxIW8sg9S7PmH4ZgPGVU4i9+uDvPlS3j2FaW

DA4k3bks88G60VzVp0moIlNpewA3wu2llFi6O5Xst5
03/LPDhH9xUS6R5b5G/xKqPMhcuYSZEPntDIszFPoDCfbwOnBDzaQ6dqeGZJQwEmaE2UmjTJrq8BZYmR

NwbesrNnL0W5bDPrkqm0liAeg9ZJn4bb3u9GjKG+1eZtjsfMz1aIrs+Meng/9d5QCGuKO+SK42byDSAcFl

fUVSODd5wBcMjtX66U/oK3Wx7ZsBLcsIu6uVkX7AdB
UiX6vyBnPdL0G2Df/U25L4LM0XXOwivjkex1gseALGhhLtzdnNMyTr8mds4jlIuS7LWQpeyn0FfZgLeW

y4q9z0Q/KuNPpWzLdKS/pRqRHsBY2py68i4bRcsTBF/4YTKZsqGBhL8SAyajmtKQvz/zwg+Lowell+CfZg9

QaEKerLvmGSJYLuibcCrgvYGXn2TiMe66xCr/dD0su
+QKHdO56GgvL/D8X5Pzcfg+NHHmJAi41xZEM0Tjv1tss82KSfcRczhQTTzf6H9wmb2HtmuWmwJTbXby3

vzo0c6hAp+xs++OS16knkr24T/IglUiwsuEywU5HY/XNze69RWmVpB2DAQVtkIrelTkeRICbM8rWk2KK

jw2pnyOFF1o5p2Sn7VAREsKsb0vcaOLXLXiy/47Oe9
GQiPI78Wg5Nil6THiX9XKP3RN0zAisXq1X935hMNkafEMe9IVkfCp4OmF2XumOy2vKqI5ExsuaI7/upH

gDUe5mnJmPR+bWCba4nL7KYMauymRBqz9wO8qxLeIAAm1XTn6WT3lejNZlInk1QNUSKrbV3Wc2CbfMZv

mqhx3QpGycopjINxerbWzenMuhec5bPgD7bCp+hiT2
G+S4wVNH5LrmcBnbbEfdXc7KQZoHerfzpmsuMJRqJpK4gtvO34eg33qbwdN2rUDm5Cq1ByPptul6w5Ut

6ZzMw3nLW7WKlep/H0VCM+7p1Gaky/ftAFwYtiB1Fpp5gRJfQ8IdKaBnxXMryafyxtqvK2Xd3i5cERBA

7U8ekq+ZPRXJj6R4sl37NR+B/L/GxQ02laj3qhJ5te
xlj5NUsyTpgDatYHVE50UMPMoDjelwh7Gd+h0JopT9eX5EN7G2iG5xAKL7kwrxH4fUPiIk1c4lm7ohdb

Z5uH5O1DaeUp17i9Qb0snBIyDcGhvyI5DXBuAui9JXTVum5d66rsYLA4fXlP5N9c8OKKjqtclAyKjaRc

em+/f5QQj1tuVfr7RRzSeZBMr/LNtRM2DXSnWM2w/W
0qDOcp7ASoJQpHxHWgqdE2c2JgOmcYe2yR36YnjsSinWEib+LzJMgBtUYhQuNsVuGlsh12dstZhIaAS4

L/gXbPruB4cTmUzkIqt9mPYodw4rgrFRxHStCFx8G3cAwFSZmzBrJ+yhq3QGMBBHkPgtpa3pSZ8E0wRk

9wSgNrEEHiOP0bySwfOXy4ExOxr9q5yYM34VKvgtGw
fU+8k91UrKq10db44BqrKLeSx4pFZrRP+nj7Cto2gCs95qOe7SPog6nxgYBrvAFiqBsXuuCEBDsQDaGn

JaNQmEl449Qr5PwmdogruUEJfL3cGCkUmyFVRRnlPWYgt3AeySGTyKi6UFrrKd6FbNCczq+PNERLbBt3

BjGAhFDKlWM2vHffzoIsAILtR1ONRx7x8fV4W8Dywh
L+tyQUAD+HYpAsJ8d+XStYqYfvA93sA+LYqtNNvUkcPt/XDuK9ssYZTwYDtAGth5bPdtmrmcqB2MLE7B

U+5lyz/8b1kKUmVd0ySBcJ/nUxgI3GIYSlZTYIaiks5aTDgGm8WBXix6NFa0BGXddSE+IPOzqqdeyxMw

+6Ve2TE7Tkf8AQ79KuW6CEtX/YbSQW86sR7mtGYOTh
+n0rWdx3P2TFjc5RhSBu4zYhqT+iOj16y6yOyTVI0b3L0sttvvhgJlsJCa4fLuDpak3o0KqHyenc18nJ

5M8s3B5ICqjvp9nlRyRfXjfFAl1zzdBG7B4W3oUxhCnu6fKhqPE33930EZUrfSwORP1nsR1nEc9nUct0

0MSjLaRNXyJFlXL3BmN948pGft8M/2i5yVyoPdJD9X
k0Xjg7sAKPltCq5jv6URQhR83ceqPpFyoJQes3gcGr2IQQIAkXMKCKroemJHfBHmnZS0rB/WfTXAG2v5

hW/VCZqCUe8ccKmMYe38rczO3XtYMuthQTSHoa5EDQV8S019lPXFpTXiSlKFvHXfTHIPwddJ1Bzr6aZj

Gw2Ei13d2e/iOAH/tlhUEtaMa8Ez8LbTGhdRCEx7bj
A+Iy99JSVtVw1dW5Fv/yswCmYwPxx4sqTM17cjU3NTpHtO8u7FiyBLQECQ9mYf+/IYm//bJx52Vnbw6k

Bf2ePiBPntbQk5tsflJmY/EvWC4EtHvbHkhXXi1TahMrNdU6FSeNw2bpCqWm6S/rOccwKSsnBtyuk6o5

/U4l1SQQVj6FOJJlNau2YConWlOlZ3pWpPmf03C+tG
Mh/oEsrOz/14l8ksCPHiY9f2Og4ixKr5H8SGQvbt1UbmCksLoy15FISuh/PAbLiKbeBAkCo26PbChhAG

xlJygFYiXPF0OPo94XGncyKwgO9uD6m5QKiCPccLKct1Myj8f/wc7PWZEpiSiJmOk7uJPUfq0HEvlu8Y

DuXV6XHt5V+KVFUz9K9XYpTKGWeWQMXoN4XHP6iDHg
cpqiZBybtKFLWLzKjslMgNHsjLE1ncbGm1pApBxmsuLroN2qU5zwuBJ7iBW2j7dctt/EpGTf3SZ72ws5

3NVima6FWgpB2GIpgrTmhJEuwj2KK8kOX84tF2K057zgdAfEbw9C4p0ov0oUuRNROwbSXQv2QNBrB2gB

41fqn+3ZfvgVOYzQWeMNEw70+HbIGNcZPczjSSjq2X
+PAiccjPXT8L3ZxaHVtUG1H9p/DstSADY+/arO6ezo4KPvTvlg2VFI8NV73VDBpBVr0Vbb5hyX8kFqul

GOhkjtBI+uKdljQOrvQvprulXt1lh8w2TDfXiEKe8ZuWXn0E00RajSRe/DDyGAJO3assPzpsJfwtsi8B

4JBK63ts18mwQ1Iedj41SV09JVF1B5h/d9XxuueS+u
JZmqYrdPAkbCRxKzUpY++bMjcGRjCVLfOD6hSVbEH+YTpRn6zrKu/tuBVvK2HGSLHmlkTPd6NMKjaM7M

owLkH0sbER2FR8gaTubzsYrvuy4R7yH1uQppe9qDeCmmwbUxKmiqFscHOoue8yootE45ktFfAKNGgYai

A2acybcS4S177X/AaHitfSJHmgih+v6bur4EXGwjic
V4EKzgZQrNlhNzXJo+sP34jdLHzawVJWBrFVHpzwH8rZIg6JRh0Wh3ePtiEdg8x7mOLVTMLg8ZTZQPK3

VxSreyooSheXojhfTJzlX69ePD4K48aaeJQ6ltarIoVxsNImIYmGl4KzXUiGRee88yBiYFPV5b7PIWdJ

U00E+u5oLzWnutI7Zy77yH1iESOIiGpiNDb83BGIv7
Fx95bRrB0yH2g0pDoXDnO0nQczs7BlgVYbuHnSxEykVUlzm+jRjlZdKXMu7TwiXb7I0fBNjgM5rV9l3x

aYtayT0+2AwrY8Q02EaLQLEDZCTVUmtbFQ3OpuC+uR2h+1i+vWx+JkbshW03t8TqJY5+qXsyJ379+L59

aSu8tpf5YHSiexLH9nG/4VJKl0ACM93/C1RsNaLM5O
ukVb8x2QPWYNPtk1/43Ov0dwJKKOo1vMmt5sA2C1e4vOhco3uc0MCIA43RqXD9R6o0tgunsqWPAsQ5Wr

ZdghY9WFlw2TNdC9Drlh7M/yZ2aBWJowBdLOx3yr/SodmkhUaz5yuIxygTbX0oZHLd6z4Mo3BITmTddj

YCzji7qx/N1kGPE1f/bVUTJDm99AuiwcBrEEvVbFp6
iRngdAiTNT3NgU3MK2nei18uw5k9aCsglhBzaUNs/TveH4encHQU5va8qzqkAp/y1ulhTd/WOfSblVqG

WOT8a7tUHHI0aJI5eUiL9ule0ETc3xqMVuAArCRbFGi3ieqZyfOsZPg9rpr11/7oaB1JkoDRf3hxe9xM

uX7ZjbLHrg503TX1tkHE6E+yVkM0dEZx/5AqmpEwkF
dRQAdEvVgAmn2eUJvVUw6ukvXw0JcncyNCtG/L4tK1pYHFkmRcpyqIX3TBgL7CQAB7Upyb5WFCkePjdz

VyY+1ZcAeffYpSteMBhMbZPXDEHB2M7wD3k+7x2fI25+7vgp5OInUHDZAT8TCPPdGygF62s6aIeTdLpb

jibmwCtJH7HCcresXEpNZp7K+CTUfShd5pue9wnFQJ
u6LQdh6fG20jKYgc2RVEjRUXwi9fiVubsfa4xw4B1VCs1LQe2/RPCtOg+KHoQVbyxsAdPSuJDcf3NvH8

komGxlCbrUaNq++Ms6WA4I1NyN5izVfMcARrk1A/YTCesYCg/NwZixsl1gTu8q1CbYzWSSsUIlkaKOMX

GoMsH/18B1KubLODb/zIP4lMsAW3nKmWFMuUT88fyH
/G4VI1ZfWHI78AQccqxbNEvTYHjNQkw5OTkGBV9jIE+ljN7BKVU6mhxnE6wdbeSMuvoPf15e6UHcoxJZ

YjNMPfBmF/D8IlHtZyvRokH/2aEFbo9pnKhj5zrzraLQ5jKn5oqkGE9eR8/Z+uzypUT1L75/i+qrEp6X

u3uR3QTxlEuRviFhKzb6vM0MgNjJ1H3EorXWQFa4Aw
+8LwQtWEiazhtEbS0oDAAUm05tuogx3RflToB49nVafltEYW+v21qWyZNzzNMaeunwB8ZkkFzRTcj8rk

oTcn/Lgd/Hf33IfxM2k5SFubpPaM/4mKUp9tf6YNzDbaWPKVYPGpjcuYecGshCeHQSfhHDxTDuivSU71

X9/aSs/ZXIJCS/E6VUB7d4P75D/EWChU9IbmGucb+M
lWHTB+r/KmziQJZwGsb/0DPal0sDvfGviXk1J724c3GELcBRvvcf03OrAZDr7yZRvKkRkkPFQj1jLcz1

/h9ThNQA1b8PXB4MPuYZhjHlNy9ciVZ6w8wIwzvvgoToWohmEDBVm5QN+BESrbZEBV0nO90oURGg0N9C

jBLNGVt8bxxfqSEy9ZSGMY6RNoZoqxpwl/TpsbKi7M
ui4ymB1cc2LIi4fRj3bhfFOoH3glAtqG6ZjSwVyXgMlEsawISbnTykOTbMvYa2vU5is2MkOe+OCNrEBz

zz/f/IOhRJ6YKP/lqW/S/3jzbjA0+023vk17I6M6qATzDSaQRn4EN9byt0InIcvkKsSVb2x+A3bK3ZXH

EOJYAdxFMBhhwPAxYaq/OhLzY3nOYJA0lZViId4wmI
YdDaSwRM4rAjkkhzNNrbdO7on1WWK50lwo4Aqvpg4G6PUwVk7uQuJeZN86zYfw24po0vQJ1CETdxEPWJ

0eANpx6V0Fc2diW1RqgR9ErlKn1tfHHChs2SSrMChJVyz25vsKisXn2sy+3DZfxWIh2Lvj5ICXaojftj

K9sFbihtgZuSF0fuBs6s66JUiGthyjlCoce8sChZI5
KN9VqQFbUmGvhMULoith7ZU6JMmGhtVAF4Czce3RVEYWa5v/FGM3d02qnDP2dHFr8BmsasVywuxVIN67

KDQbfMEHkP4dFLCllPBpu57oyBEnmMXKJ8hIa6gRJB45mtpobm9UCI6L9CfDCY5uaVN4x8jZQAtdjHyv

XgMFnfqIcipQFryiIghKTaiT/p54YMLf6AUJ2Hw+lo
YBZbhVtD1nnUWXZZaRovGWQ4BSUSAPELrHIu0TwkYGWav1wDFdZRlc65kzmV2+vPb6RqHuiARMJJeBVi

iH9LvAipSwpQK7Fi2yjueoV1mqrXh82Depa7mpYJbdQP572C1jemdWKTIyZTDGB9er41XUv1ud3VLkzl

HChTuXE/+hcJLSIIAy+jYZD9A+LR08lZSgKHljjDrI
tAR5ynQOepxyxWU+PORS66mMLkVy8mXPUr/45pLIteFIMMbvL45WwBaPa9q38nKzr0OKQh7S3d7e0S2+

0cexMk8+5cFT29isnUWAxOt6yMu0bW4q6git9l2UgqCiQhvv7BiQdhLfoCx05pJ2i0yP1VSC6Yxq5yD0

AgIBmoGgU96pg7NMT+2jPAavy8VgjZryj4bp9LEEp1
P12w+KmA8XQ4Wk6o2lm0/A2+ahjIJIbMuPhzcyBBb8D0CR7gCZ11LtuF5ub4l6ax+w5k2iLHSKfgQfA2

zT4+8jYX4GIyexixu2N0dSelxteHeih5mGVqxd1UzWPquUbuBbAxWaUULUNI8CNtJ6eMjWhkl6kWXDiU

e6x5B74aBwKKEkfUtl6qPG6PQFuGqYhqpTqck4L22p
dXpmRTeHqvyRcgb+jXXa0KZ1aX7MEaiNb2NSYHEiSqDyV9enx2cvC5SgLrXrVKJHSGvVHcKc7jsFx4pt

d7a2ONYg164+IjyVH3zSyxWg4zTs7Hdg6jDsSRzIOV2FShPalmYB0Rh/v1TLyG7uF7ND3UfZ9dgjzh8j

BMhHb9gOJUK7DFPIgOvxCWKQT5zCvlfcyvFHMX4QVx
ejZcdf9E4//7K/z+p//pf/qf/qf/6X/5p7Bv1GJf1Taag7OazpVUaqbMhUM2jy8QmkyFtxySjEy4dxsr

1NbW40IST03YoiybQQv9uyOHIi0Na/8cXRzixfM7xUXDoig9+CXevvNVwaq+Al7aZ1nIsDwA3JlT5/XP

CeVYhG1lGZIJi4ym/PnyzibFSv+OFaNqMIZu3V4GIa
wuMV6RqWxknNGkObD0JxF9Tiqj87m2DIa9+DUkfh1s2Nt2XJ1FKULB7b1TVkda0/o7mqBH6YASNlFIbg

OgnMLG0f2bUtHFFOIV+SThht+ELQDfzPgFUEJ3beZg4ufCtZ+tGk3LapoXiB6JqEMrpzA4FLWjJdJizq

+l0TlarbO8oUDNy2f63IIQ9vp6Bf6FbZTZHFrmgrPQ
UlrpabSZNbSlHIZyozVfJaDaamfos4fjQclrKnKJ5hHE4StdU5wCogRMmsBkctTPSDcw5RPFPIA3Kpk4

8B22iUhyawJqx4fN8Rd/UCdPAhY82QhDyg1vi9QMkHEf0O/0Jo1pl1eUd+uc6Gm0k9cgxYIM715gJOQT

62Iz6GodGdWk1SfhKtcrw0xB4t9LliIjkuij2TjvrP
bnqc56C2BYh5MUiaB4J4dxSy4zLpIAfYKOPnnxui6s8B1fCGsnVW8Y7ohrtW9BGd6l0dlA63obwb285c

lNWa3ggg2YCXK1Zn/oqPDXm3nMfNz4Y5QzbzfkPt/tncik9Et4CcoNngzRVOTWnrAWrM96Ejoi703efZ

jE/ZQJl+bXK9CtqDvmoq6pdjk59iXjbgQOOKhLOKCi
UI+f8L3SyHooeNLhV47XCdba8ybkf45L2LCaf3ET97eIuWhGXCxGbdhdAJSQvrIMBDY6SkfxCFGOR57x

xrz9X6AI21+3jxLboH6Vue2a1Die4jc/zK73Spuo0Z93MO1RZktb7dBwV8AJ5kCGBgipiA5SAv2FcIR1

yRx5xcuiFmrmoIWJqUtf2iZcR9AHu+YPAGyOW3nz7r
phfdOWgVgkT8lpmOIe3FXcQ5vZbaL/wVZfnN4U7I4Bu/l7axd5hnJYbdufqIGPfG2OjI/4t5Np5Zvma0

3o6/GQccKOk/fymKRMUNGPpQJA9NRO4wxCkYvlK3vjPYvq54Q6TiO4dlbgKUUFyfIM2LFUKnECCaK3o+

yW+H22WhAqWeOdHAPW9ZScOmIovnjjTW0VEt2pJJMR
kcLe+yQvDLIBba005VntpL7RQoXOF6MzlEzYzlgxFJT3ccd20GHxbit+PK0GlUGdASawE7ijcC5vqv8F

/4M40KaHsHxmn0rPPyGvrRflesHd4bgEX7j93lvCExuZBy3TrX7Wq6u3GVtKaOALAFfuN9Bm5GuLcZqH

rXripidV7N2O7b8/b5xyYJGDkqkb6MjI5bzYm/A+72
6j2plUcrtv/zpCty/RZIR7eyB34VIpVgwshexkGRkNd0+OZt+68YXd2BH9SRrX5ppudEzvfoKek71Y2Y

DSwDuAHM9LeUvRDPuNBnKyzTrFRYLUJqv0a+mbfZCssS866Pyr8a0XugfQlTzvDj40hf/fZLuKnTfQNY

gwWzFwVof4xpL1MW+jtu1S4+/WVRROIBTbGwSbqKL1
7pac0yJzKOsoG7TXwb6CssAsGi+B3EfmdtJffORBqI97IK5ewfjsgUOmn1a7TiU+umLnAnyhUPXnIUg5

YktkbphKsrwTXPyg76ZTa/e2oRcfhMzJj8sppggg2SRGQuZx+/cXDu5OIHtxwedd/blao64vmawMOHjv

24FkmCsN8RbBZaU36G1MiqreLHnmN9bUwnntWLbhFO
fXE8DGq+ykQBOzaKixdV7j2WNoio2aLu40Y2ITZoEyYGMWXpgIIuCkAWDseONnzRm/lhfNJbd+sntT0+

BFcPp4DSrWjHVbErokFFsv0U+2KXt4gxJFWyKpFCS0dOeceSQM1/iAVclaffkJD5CG4Ryq11MCAiwAAt

vp59D47TbQEnIUvtd/uhgEJcKBcHMxK4DpZI7d/BQW
n04JyISiKJfXLzG4CmM6ssEeuxHuDXGSe5TNoqZvGO4d+9EPgHO/9xXDA3FXvDGhbPPMuELnLKQlS+8D

oIaFrJsGOl+IyTDureNKTFd0w378JIhXlUNzGiGS1NwvXGdTw26iNqqoZ0f/O64j0f3LAx0YIAVSuEV2

0litRQ9D5rLjlRb5SdOkrQhQbRLNuJXK1PnuDfHe/g
w4SHjGHbvq0y7TFZP/BrcYic5sZGb/p5LLHIVmCkV5oPKfTlhktJ46eH/2Of08tuu4qUdcLGcoTKHTih

BOFT/Addie/7rp4QzKX7N9TE3u5xGp/lXsnfpXa7jTh2TiNimrzXXlNlbEvCuIP5DMVcCi5OoEjV6bhjFW

i4yXcX46YpjQV63VsJmuIjg0J2adAxU7o1zrxKsbIf
j6MGDgA0DjIVcY9qDBDy211i7P7eUNjWCdJJFkuaqZn5OU7OCyl3EmM/okoiTR2wG61WpPO0lxyksiud

cG1iWySAC+XJjEH+9OVQVAOzVclNTGSzZ4MpyhTtzM2wD0KGhlkuHEXfqwkAGQ0pkwrAsAXlX1icYLTg

jLxy9ahGi6yBpWBcZ7FsUkfHhNc5NhrTJ6LEAbjFtd
vyrg0Z0b5dzbvo33jNs9NSCOyEmK1G3niiQah3TAYtTxLRfRC9Nh2SY9XI5ESbWKcc7+HvfLSBKd10lN

qvToHeO0U+rG9cFTapo77qQ+I32+ApMhwsINZAsUMjs2G7H7jYmOFOSTEqR+kFaJgx0QzsbYx8KLgvlu

q6qBDwx3DvtjYRxIzYZI6WfbvQ9laXBBh1xW8gX5iu
bvhRo30BD3Nq6H1iOrgr2l8/VmphAFusraexpkLhi5er/zT10i5JlOi7aiNdxS6og3HLSqaM+vTqni7x

izGSXHzlupyyYSpthd1IHZ9XUD0jX0j8ysNBIFlu/rbz167r0/0deYPTzTyD20nHU7/J2Fk/74VIp2LF

uN6HMM159OjWqcaJ8Mg/OMwYWR0RUHqN9j1++2N8eh
sDUvEsHlg9pCpYy6yQ417nwKqljpEzyuLUlrYxp6jqN3FuAho45rDQ98W3vacAnHL5iJFZJZmOKz9ksV

tCebyF1eDT7zbQoDtufu+Z8tYKGXPQ/X0pNLOArwaJDoLMVcl/VPeyb03XLzFtgpWEXAJfNyyWJZXiDV

7pF97XJF6GSFmjQL6J2zMpMwn/STwZ3IypcpxjNwmR
mbJ6snaZjLkhTIJF2AkGwQNuGO1t1syfbcthAmADPylXkOBfG7FzxJ9KRcUYxM7bkL5h9Sz5vL4lQJc8

P+/EnUnXkBC54HaaqipzjlRGw4TMN9kQ8S03urlUYGrJ6mcggKv4h07241CRgZoogJvAQGe+HBiMgyK/

/mOsg3dTH50fmPNzCt4GTwiJFdHLpswlKprXejilKM
TcpcAW83QSs1rOWCaJDDjQYp6ln8NzTDd6SQl7fAwX2k7UVae8mDueKVBtX5eod6kop9b8+pzSftEsmA

AiBbWtXLm+rZJFwUQBezFqX38dt3FFB8qyjY/X5Gi7rA2PeeGGpii5WEzlo6ID0IKZVyxb8Yn8sOFA8y

XlMmUWgpmlSqmxosBFUMtzuQhZXQndv+pmZkmXiS1f
FUs42GnXIQXeaxWR/rjpnuI+SAPoKKeIjaIM4tlM3G1+nERt92mqQsQjwxRtEEG/ANgahbUQZvtE/jET

/RuqZa9k1oFWac30IVS7Zf31f1SvMxTz7Lj09S06HKdlg0myE2RW23zWVeVXY8pm8aHRw7yDaMoUf8vr

cTvJ34sU+S0dXs4dPtuiUzDgUaLmsE/f5aeGQE1fE6
oaekOwAMZQemxFD0P9krKfbol1nlmy28jSJyFmDW2IaWNQ+kMGgKILfO8mD4hDx/JmnxWti2rekgbUNw

uD5XneY60SyAZzTSggX/ATYD288+3Xwt4/B0PqLMisIML6OzGI5SfEpfSRBf9+KCWMJlZ5VyAdonlVWp

hEjNsIjbBOZsVa7E3mVgob0cPer9jrET475gYRklQ6
heE8Op/v/6/ighe2po0pw4e5v98SV/16FcDARTW4M8PTAf3x7g7+83aAgZknezxqfW/wsRWE6I3z94GA

5HNuXPQGRq9V/zN4Bvyqin+OgR5BQhKbU1ur3T53HQmlR7nuUG/jhqHrNyATyZEES/IDOLgR6jQ5nz+t

AM5mT5YZCx7VFDGLSbRp9REmlpEfanFsUheOPQmr++
9/8/2aLDxB77sFYrVzVTJ0oq/JNP92NfY1D+dJG5J/+mVweCtOlQBqqYraiDxfguUQUcr+FMLE2cYgWw

sVZ4l4vLJdGZX0Bj+fYnGRj1aQLReTg/50OCIMaFrhc9bOq1ZhawdGTnrUY52N3kHSC+vGd++JRjq7G5

AzQeR7OFHwR9wB5Os1amok4048/O7QAzHI3m2itANS
4OUgWCntHyiXuB04ek0X1wLpwClPfJNCe/HQmD6peTdyO8jpWRN4x71KlQh2LtxCD/crIOy2v498jnj0

j9GL605G3wX0ltcpAgzUshDWa6lT9SpEaom6tSJkR6dP35wFB4dZvgFt744w74bITrHoj8AOigIFT8Cy

1hbKljST/EVSYALpjAn90JKDLsX5B11hMaIPnlKXc7
T9JBOjV4ce6zySA1FEPRpzwWc855hSRv8KRSvNtldoTUlX54yPIwS0HiVAsNs08Tc4eKI6931BTO990+

Lv5hMDAlSQf6i3f+oExn7kRT5aEkc6Ru1+XUXW6UpmBynmlfYLKMORy5cGLd4L181OOFNr/WQztfZD0q

9pOZ+Wqf51LU/lzp2V3Xa30jfgk1DTd9iYE35nu68V
ABiI3A9YGSuiIlc5wc88wU4GAsX/HteSgAikusItHI93MVmD9uQ74JMHFah6qFUHhF1eC5azFjH6LPJY

72KSFDYSEsYmHuIHWTj9qmF3OdI2QfepZk/G8Cg+Jhr7goiRdyIZAN0VleiKmd56Ih2s8QLYEjcbXccB

gZCSIp8SMhzHnOF3mopW1bzs+Stqmrxy5i85LuTXm7
UABaX1Ou+NP7frj5eMlYZvYqFYQLazyGFD8nLCMS4PW0O82H+S5mW8JcfEH6wLbwPKf+2m3/dNsPz/zH

9nvAzsCfxqM/yxm9SLVlKmQ4Dp4Rb2nBAmV+0BpgP7+8m/Nux5kjs2Ghw6CyhBz3RkJ2zQ7vNZOn3hML

5dfINB585vp6mwHKK0lC73CaihfHkpR28IbJzZ1xh8
n0JW5baj9KgkgTTj0foL8RrxF4E30YSh0E2/mlAkJ1PxjspyTRIABxw8c+IwhsRiXnbTO1imFGmYFZRW

+ZdgBb7wP3TuMFfA7qc+mf+wYbXBKP9w4P6FNlZJvxeJqnUInHsZs/mlA4OERg//VOHp4GJ8vnK+VEJm

tc4IT3IUodkbYygQJV/09Xo3B1ByEDuAjQtT28MhUw
KN+yAvoxHXYuwoVnFF6oNh6kP+2NR8bho+81wvHtG22LGQR9dMsBzbmaZvvL2/tkxSaiR5Xj4LwCwDNK

yPtX58r4NvDbuHtYIMvRpzvaH+zKKTrj0QOE72T5x55SabO0GDAaMmKRrJpeBg97H08DVJbx6mLMdBpb

easFb4IOoej2WvfEnqzLaWtRyGxCuFHhHevrMbUIDj
2CrvYed8nwMQt39Dru5urx42VdVf3lk21XZpCdEgE85phbdlXzrwUgk0dsH1HsY3EKx7CerXn8Q6x2HL

iy8GM6+r5RHa55jnf4ueDsMScbPT4VC2TfK4xYa4FWoEiIRMYCKDq/8UnOm7G8YU9Iv7L/iQXK19KAi8

lQpJtTDSdd54pI5bZ99ULFNtreuS8M+m7Lu+b9uSI1
i0VAigtpnocXY/2BtMcMShHaW73oMsWhX/yQhV5X7ks777Hh/JAxLhuK4WMky2pVfschU5HGFLIJ+6e5

ezpa/FAC1ESZLHBZwQs/lgWvf6r8+o13dkRvSMJ1I94F9U6KOgg5ia/mbnfdYzb6S/BwDrTZ/lJUHzfw

UxdRH4WwvQnQWkmvQJDjejm2QVrnjP9KM+hCDtbLl4
vwAcsMy4WNvKR20BWhjkPWEGGePsDMMOyx6kmV00BKcoXw1naU1PHyP8CZQOThG4YaurCphIuNkw56wO

rs6r83+6gs4YfnwprtBSg+4fUSwdPcxncvPtATqkUHa85uGUJJUsilIG5pyvyCNMktjtZtKdkgX570i4

dCdzC1IkPKRaZC6rg05gLzoDThfGVMFWZdTdkhIgIw
A9Gbdzt6G1vqh6a+iavr6zY6ydXDwk7Y+4n8VbeInJPo/2Shg1lUuHcO3ge0jKQ/Q1AnzbZES5zsxtbt

EkSibixe2hXmaBuLnFUNPpqKUrUWSABoZODMaduhXjZl5c3aF3wvcsp+EjS0wOUnRvsrbG0neMjb2wYZ

gMXO5SqeQrSo3jZTVYa6wL983hj3KsUxSTEf0uOxEh
8w7DJM/kwa9Ng28uMhaxvF1KvAlnOAnbP78skY7DvOK8XRFZ4hW0SX4TA9kXpUdksKFjKK7a25vimkIL

dYg63uuGs2vetw4uB9I8igdINsIvC9jMmOUcBzDBYHDTtqXAk0WEhLrCqnAPOYJHygYERy4vcx4ni1Nd

IHpGncXRFrVwQvgLZzNgRVA33iQmZfwnUAzHN31pbo
iPpblP5NwhA1i3L60yRFC+VVsmCRCtrimf5wLcCXRrL8OYhazTpua7G+j1j+s7uZJjgu0pu/IBAOdsQ5

voWKivgbVt2e5vqAt0mSUNnLIHsagczmTpqB/OCBmezvJt4Q3q6H2jWi5BFK4dFzx6cjMus9laWFRPjs

HJWFOc9aL+hOjjltfav9gC6QgUYMq1H6d2aQ+4NC5v
up17A3zb9NNY7HZ9jRkV+hjEgvEh5R/mRzcxIvZvnc0gsEvL787fRK/eov52yc1hBpwq5u9jV3rPT9xp

jB027lE/9xXgs1OZIiJmf3t6QVy1yT0YIQMMJvA1nK24W/T/tk9wBTvDXo6d2YmKcDOTA64fYm35QYsg

lUCEkAmCgUlb6bM0wOToC8+Gkagoab9++nEdwvims3
7fo3WnNv3jo/UtM2rbmcKjUduLBw++VZITk+GEwl2gYr8aPUgSx520VqWIuE14FtxvezZK+rLMyKhYRI

5x2ZeHhxbEF4MJbmWU4qYQbOCeLaYAdSOMaeo/ZPBMs95csbfgDNCk6o3Suh/o4KaVN8ijObIerlp4RD

duf36dnNiFkDnVkl07DumwQRY8ra6BAwTD0JohT2ED
l9Pjq47NFCwJOz4hxfjj/+6oRvCWSdnntQdyI6OUrfKYGWOcTnzVnhyh9fu3V/Wjadh0YpeSY/SQyhFV

uXQLiN02/oOAM98OotixjdW+kWvpkijH4r37wOFAdo4cmkFsPM2KvxmsjaU+KtdK5oD11ncs4WyoqiVj

6LWBu0+iNLF6nNQOopMXIXuudPZz3gb/kQ2BCdaEYV
nIMgFEuZ3PB6QsDu0Dmx/FqP48As+zcJCBwvJcDlXKmO8UGVPIFg8t9tZ1Y9EQYsA//WzL7bvw6odf7y

eoqtnDWIB3drpZQnuJGJDgom4aXuta3lKNRN3+YpYmKjY/2U4i+wp1UUDTaevs1C4SVWQQFMeC+1jT/M

1Zz/yOe+zyum+XvmkFg4sIEt/YJ9RL6C/HNDxrr0Xy
q7StnDgCCDOtgVie/dd+zVvB4BM9poq2OfzoLseT1ayscrNPbtsAMc3348LRkPtfegVPYL23QDdF6Db/

YWro3v63fWOCNEH6WWCtyyElGB3oTIBnwdHAtrQqu+TuWolxgK9j57mNXPuYMVtGiYfNbHkiqKrlyrDP

MxCiFNe9c1ztiq9W+d0IJLT/12Jg/NtdlCAF73/PEA
bgm/cBpMIwmGDKVVrHFSEWrj/VhMN08e5ZKQOUBrmpt6xmBAOpsSBuzIK1cjtkLM+DYZ90vZpAFqnrPq

il4xmxyIjvJb2i6/ezb2EPPGM56WPuuCtOtze9RnUo/wnXGyZ5DNl76VGJyIPYFfgcsI3LX3E9xP+hwz

GEkVko8VunPS4oxz0XLyGVZITXFqbbiRdv018oNjG6
8TmLKo3VIetbmdMXxcNXcJ6FhkT2Kh0KkMWr9ksURQQiB7CIxWsV3C7rN0kFgQj9rmPcWviU87/48ZME

reqefpJ+so5+r1nfLi9iuKxTLVqFKjwJPrEdYalTCOKQ6a8zYqTEjkc5Q13W0k1psqwMD+xEI7kRMGNt

B4vuKke+NmWY10jvf6ZcVN2iQ6S6/LwQM+/5nu7DdJ
9X7GSL5LODsuEw6auTPc0zBqErRQBToW2G+p4opgYdFsPXz/NZ4DiLtnP+GcFoAsm02cuptZft7hd+Qk

e1XqOQHVIMZaZ+u9grphS+LLWbn/0OLDciDzJqDijgbQ4ravIgpN+wKAVjGZ4VOeVHY3/ZRPrHvkR3Gs

L0gH+oI3Luug29ljuaIJaU2nzLnxzXx2Ya1y9PB7L9
vgMcgYaotvjqizZmCLViAnP2Yq5eRoBDbYhgnqFRXZFpU7GttIWZ+7eNMSRFlCq0cBTtACxAjMvUp4Nn

kZo1iwaZDznGxyddo7GgVAOt/poTAlVndhOq6KPg0fkK96MkxPj/p/bJxmo01H9fEllu7QMebNPWmrwN

j+mu6oYBQs/DlRpsRWY9WSgSNI6qj48tzbQZ+6qG1E
gK2V2/uDy1nrks3h1U28isW3kdpuba0W2/4c8yexK3Yu/TB1z8vE9rw6RtcdwqdypsQiHyj7I/tI2sV/

UakPohOYdRfXPtj5bBW5Rxq9/FiGIe56ipH1iTvjdvoJHaiHvcFbksgD7DdfnIXfSJ9t2yoTZU9RbFZr

VByYcL3xBIjkjWofnmmAVXGG4ulOAAK1gI2IYO3Xd4
EYsLaQTaAPkhiLk1yFI8Tm1wVhLSdj8DLJIfkb0PSie0I8CYTPtyRZbCB0vF/VNranvf5D6G04ojGjh4

MHppvYbS8tJXiN0D9x3lQHS6faBCWe6TjvwRBJK1nK7W0uGYrYQf9qzS14vs1261qsgHQjF3mPdA+T+9

JJdO7rxOoyWSfoW8Abl1xgD6WVCcMil5kzDbuaiw+t
hbju0PsQAqe+5N78wuoulUDdk1OneixsEBVKz9B+6gEizs9rk/tsvU1Wo24iVkN0Zfx3+j1+wSN03fuO

curs+dKSnJpMfj7+R7MGKjT+D9WloNi5hStUadgatathZIRgGLRYMeddoCWXHWz2UUqvmfue396PpWkJ

ernaP7W8vGx6a2j1pCMh9Q1ytztJxd2dfbzE2OnSJR
CGkvk7fYMnFnXcaBCRfkSqTRfmztj03+F2RRfkTQlz8An97O9CYi3giVfq1bOKjL/z5qZs6qB+BWO44q

2o0nN1Z1xk8yfDeVHV8MArmZtAhVViby32jfLoOq3xXZatMMU+dFihkcDnOjE840czPlUHbuZ3d9Y9IX

DwCvFxyY+j8bEFOq5AXMtx76MCA1f8GdsLGg5CPsIq
wiWVve2Xd0ybHN6R7SfhA6B4rM5X8k91SuP81+DQwEf49fgnYFVCdEwwPb+ScYOMZrS8uNcBcI5r900Q

6SuA0NpWXDeFetitvmcDT+CPmVlQN2KlYmb0HWyuT7nMqnGD71+p0wpG+0jai/yJ+qcjproZl1OL+pCB

n4RsDt3a/WqYp3OyHJlQAjySfL3XCo9ZElVwTk80Nb
3vTL3T/J90+dYp8rq7QKvMAMpZHLocHGuC7+PGzFjO3Z+RlDx9hXqv0TkiCjLFgKXhd6Nba5VjdvLm1W

8GhWJTwUn/IxzZ63EBsetrAW6n0xZ4BH/z+0XxZEZuS0u9i5Px335dGsShVBVLOF9PkXLjpL/2yF4XyR

Ufpu/wcIrjSs3LkonIIFLjYYxy2hAhwk2Y/1PGebIW
kT8lF7LMUg+rxLtOTJVI6lDIAAheERlWfdtSm1hn6o9YZv5b1X5iCSjc/b9zgcCILVxqFB234WtUGJU6

NAnt+B//rtYsQ3mdXMZke+cXau40osn/b23ehJEIj66NjI2qbfBJv9aFq08jk50gQaIWI13Gl/cw8ZBX

IObCsrkxI4TnEa+kmPv5bM4VT/+JDSomVo61/NcynD
X8wWf2uCqIbbgVH5nrKAfyjXh8kTwwD0hceS16dbnThvrmivmemIsZUZQhG4vV1UFI44kt+XlGkgj8AJ

SGUSNhinR9sF4vAuwWSGpP3DelDmZDcqc7qOu+TDI8SlDKMS7qf35I+ufGEvwTo/9r9z8v+n/+n/JuE8

LP0/zU4y9C8IQG6ie8LxWAAsYFVgFY3fcs3oEiTrAD
0huh30WT0RlSBdBY0DKBmuH03oULyqYz20ZRlvYYBaTvNrWCp8Vl3lWdUay2FcQ8OpSLg3C8dKQsmjY4

CrQOuxZD5nGLKwAXWgRLF+Pj4+Y9JszfUbmZTnFYWqJg2Bv424TJ59cqIjDTMfYTCEXZ7+TCPgXO0wto

5xNfHdLO0nfs07GQ4DQU7hrSliLvP+PkK6klBanM5W
yBd0SJWyBXSpRNt3SjNqUTNbL74LU8rsWjYaVU9ASN7XRJ8mc2EdGRIhEDMdED2dib5rTCPnQB3zpd42

DK9LNW6xlRnfBWY3VeRmD65eyLKcR9jwXuIgL1OmdAivVEKhIQ6qG5CyUAbxKHIlRA8lgtBscA3LaAk8

OFIiBp0HqPJ0VTEnA84nNBOoYTTCMOVgq8PjCF8Xt2
qoizWeYKCfWP9PFOFmJ1SYT9YeV4subFasNKYvSX7SSJfkiCSbQXm9FH1DpRLfZDLhQ53zeR6qUV19XL

g+PnN0cmVhbQ0KeNrsumVQXM33LjoEDxbcIcHd3SVYCO4eQnD3AQa3YEOAAMHdgrtD8ADBCW4zOASYQS

bb9a0ub++pW3U+3HPPl/Overvq+bS6e/fhbchmt4l4
g3zmWsNbYFl4QTK9QUu7aAUGECaJ7SZ28MqV2MCTdMvX7EL4NRBNYOMxTqxLL1Da1OEVaObtbuFxSklL

0DKLSorwSXDxC/G4KgfRVwQdBhF2GL6VAw0dcab4/9pa0SzSXeJ0L/UOBYhW9UAuHZiw6BiTTKxOHCNb

/acB/xhRviBCancWpuI6Oms4V/MrwAskZOQXKMioqC
udo8rGThaUEA+U4P2qWFdxcrS2hJlTE0mCJSp9NX9oleVXmXX/c5gB2coACaXuZkJlDlAAPhOHPCOQGY

D1hqcFFaud92M48uRVML7YvI1Z44hc6Wj0sxg6+AL9/WLfIkMpZ6gKYgi9QK5V/1dIYUmCYAUuPx9dfb

Yvktfjpq16k69mgQj39Qwt3Qxh/O+KurWdQQc3h8i6
b//g8Ah+uuG2mH5V+Uh9Cf1yYFBH/93+k4zze8z2IMGITqX/hxfSC99/QvApzC5bTUBB0ZF5qHjj4xjG

OCEJOmhen0Lp77JOy/ELaHfEMZLylE2A2U/M/r8RC/v/xez/Colleen/aY8CzRTAsytXEC2zMme2TUeFzvnz

/sW/+Bf/4l/8i/2ybXw7FY+6wLkl3cIjOxIYWJN38F
1M/tbu3IvYDCmqOubMMqNNw3RrMNLikqLOB8q33/0pxh1dF4Y53B9TymXQQz2+KuOzoTCWqs4Ha5G35b

n+xMES/nYrhd1lLE07ZKtrjnyA+MCFSb7LtrvaovhjPwerZp7OGqB7jOfujzZrKpPNLeo4Uf6n5SO4l0

jX1Cr27e8m6h3UgfKPvycA56BDJ56b7OQTXeOIKrvn
Uk8w4dZgjsjZV3WneCUDxyycL2U92lptX14aGDBK0IMJw6baKq3rlQUGrw17ktIbpm7bdx9FfYHs0MGJ

R3DpfbegsSVuL2ZdKzMGQwOHdMbBHRyiLofaPmuSCXqYrgDZRE3T9e5mWl88YqXoEgwPnNDC2lP3afUV

UzAE9OZfknndYrcH4YWdaZHrd1w1brsyYP//l8YryI
d/05Azxh148Ldkw794GOVhxCiHR8L9fvLILuDNWBBUYlLzEOFAS4NoDx2d8BDKYwNsdx0xldmNXKKe4/

QzPFFxek2s6PHyAHXCMVU1AfXgI3lI/ZxAK6MQZXeBbSnosqptUetz7GLl5KsTL+vpuXR7huLR+zQ3iT

2SRO2hT/I5XPJ4pQIDH72CzuL8gyaOeKh84zvsIBeO
IWIBPRSkJ/38f4BWZLLV3w6LEq0Jtem3CuPM/ouEnMigMhMFYkJTmRQOTgFHdi+4K7z6NXHm/zAnJjIQ

lkgi9Wxp005E0Rc5DfeuyqMtPyznQjkAfgJemIIeNt+EXOJqIgYY4bBYwCPxJhV0q4T0IvVi6M0gsb2X

RR4+OyXizx9AD/7kjgFWnlzyT6GmSybrbliyXJMUSU
R5ch3yp2JUtiqsB9g8UFbhJ7gFqLdITr4Yn6FPkimL5o4M2wb2xpfKIr06AltjqbcYw9J1ZzY5c3D/BU

0gil9TAAcAbjVjrGRcsweQ1bGG3O8nMoLn5d2i8J0/b6QBqYota9ggAde24eauHVfAJ/M7JoWcV//k

3fATNS8GnQWLzj7fKNfz0eP6CQbKN/2xvfD9+LGFJC
CIOUK9BLe+iUoxFJoUSAW/VKtXKP87+QqiUkEdvEsKfM0+XGYyrIZCyKUdyfmyFlyGtF+Lrs+n5dcqV9

94rgTCOP73q54Hub+2QjxoBehQ/8hzbhI7SmCP+0/i3STJOFJ/ZxL2h/cMWcw68jdeUpF/VM0JgcrHG2

c5+qZxTaxAX5R/O3sjhC1wSqYXaCvTaPTdhUqgg+fH
Oqu7PTZeJB8yR5/W7b4NxjNXcAyppEbe8kMFW73Ak3+Wd/sHlnjPHsNh5iqQO+4Wwo2CDrXw0VAWffZc

OqkRJbH6zficCd7InDabHGDd2/hoh1/Ye1wnsqaAdW4Cn2giSpmEPDxgGgjF9KoQigcgGq+ufLkIBxVV

EXAmbRZ+rYlEKmzb2V0t5jzTED5kYM3taAbpQQ/KuC
LZiORMo33G+rUD9i3HGYQ/gPssQT3W1RuWHh6VZmltaGGVudkXf1b+iz/7fM2dqhTuElJ3a2D8ftg2+t

GeJB/dEgS0BxPbfZlewSX+XxrvUIqHF5JBBtlHymHg3kB46cB90PmQt2I2408p6tuZJnqO12xNEZXs+J

8cp5Miff2eQVfDeyc4qis1ZyQVDcrDKC+EsSjwKVhh
OhlA+dAyUoEN7RddfD73JXD0KZGI8XYM0kpuzgM1tbfumAQ1knf+FjNl5thye8rJLfHIxCY6VyLeR/+9

QW/fAkEHyEIYy1Xf0s9wvVAJmA+14xWD/HyosYn/xD4B39reADpxW8kt5pF8woHpLlrx21ytfztQTGwl

R+H5TpRQ9eLukQPIB+ZS7et4jhhVE65PIqnJK21ek3
fXum9gm7agSv1ntSGWq/T/f6eyvot+Fr29CxjXQcsS5Ssai7cNP5MiTrg56GRr8U9JAx4KqSqjmh0nTC

E5OD0YJTglaeVIF42DJIV0RT7DbfJmXJKPMYt5UqT4LUKlow05zkn4UpXGI5vFVG0+G9T/BEpWQfzADc

LMl/qwhXZXMVLGT0UQB3fOue2sj6DnvMCPrjGAGSs6
kAHczdn4PuEWfx1s/JLh/agBEgBh8pBpoIbIHSYHnaYykamVJ+ZKZt0IPHGzb98kvCEl1H/5XXoXo5Nc

zZnPTkophhs3a5zBd2VfInjJBQVKx30RRFXJAxeF5zCB6tsue/VOHRUM+PM0b+zCElHH63gfX93xGTwK

m1TJE2h1HyV2ZsS2Ja+K3pvJkuKJaIb27r6jUJPbNA
0AGBhZaixP3wFDm1FRvsrPCyagEo2/XX2gbGATbUdDUuQkqcjNJHaHd3OwwSnRWvvLqxTGwXalcqEukc

fAiVlXDISW9CMAqm0evDQ146NG2ATYoJWbLg8uTLAzPCF3Zekyj0DQ9o0PbrfbbPAirGcdagesL2QF5/

AUjsGoBx/TcfuVNk1XcxWqx07PFM4CL0f2joxauhBV
7qd8EGAq4cohSne5y8gaDRx4fGy9La12nZQQOIAYcCcO7Dlg/H8uCE9FSYEdoN6jtNG0dfmggXSzl0KZ

QJABqgVma+i1/dGIGTYtIOvlg2KBxdn9513yJtxfPXOVGEXiUZR66JKuQcu8nvsv4dGsv0ioyyeoj0/c

A2c56Wx1yx2HaIidcpOYqYULz4yBkWf+uvNI1V06xN
vvJQe45+8mUsGQbGf0dUa472J1iqXFTKoYNDoerNHDAsLJcE1EoRuJSX09HhnCcEl2aZ2maRkOigfsUc

+OLmJ+1MgdDUk2w/kaQF1dEw7mmH+Bwj0CeDfPfJofmZpZ04yzkn3oZDMwKFv/Esc2ctCJOWdLDrrcq/

Vlj7eM14Va58knI0DaXrz0Y7tRL+hy4B4BTVlB+/hI
GxNaMccbw4NzLoi9s8QB9WfUNY/Rr/5IQ52KokomOVWFrRVbVkLVfHRvMZzGfqKmRAm9giIKrtdRvB+o

QWSiPmCa4SL3h3d1C8AUcXXw0/OBNJlNqjG6VxCgj2/aB3y3LZJl4Eqcgd4StI+mhIs/pzMZumbdvBUE

ERcCAPuDwFumqnVHkdaiQ5QlGkntKZm20KqGJZppxK
Vkk1yZD7Sdq7a6CxlW9t+mqMT6rgydiFZ56GrSsTqOYpr0PsJZEXeFlo2mUh1luvFDBIdP2jLwMh6R4t

Vlz94S+Vav/9R+PzFtmruAYHn2z/2KTkyKXh0M1AVGWwRA8ZzZbOSqy8pg0bhM13g0GQ7b4c2bkaIeXD

mgTXz3A3SR2JeaJZ2K3Hxdm4ubJ0TtaPJrdzsxtXHC
DBEq2sp4XKcBt3A28oJy4ng8cBFQGxqVP0Gcen0CvmjH+TGwcXED8JwV7Ar4FTPO7Mhf+gxiMi2Udegb

xpF9cI7EXviWOLlYAU/JNK7/ZsFQrlBkEW9SoOFXQECjaJwZqah90+YWTOxOzOiy/8oXOZZujz+4QQl5

YZZrUgrVJnG0SWOv9pvYAC7q6qwML3b2CfrjnVQ9ID
3Ah8Z9/MFGn06mHUlebhBAFVw7EcG6/VDbSQ9ZUOfGTTMafTGteEpUjVwiVMyF9GbWnwi88KdsZmsL6r

cTuj3vckfmL1RgYOICgNIMwXJ77H/8e0531orQfOI7+PaU5NYWuk7vPDm35V42QQfgueRxDtk/gNFoF8

kUhtihRyb2psR2BcqODx64UpuJOtzoO2JyLmo75gIV
jtWPvTxfBaZX7MsgsR+WIUxaUejgSsf8fUgb+g2R6ElAZ4cGawEAfwOLeUYOKN8cB5CPhhyZ6GilS68k

PmqppOlaI0zgNBQvKjm7GFsgC7NfMYSvqyINpTcDjjrcndowA7YIE6RxF6PTR4gKQD3xxsb2W5WVWmjJ

HQ1QSW2vXROFN+vnPSu3eLCKAHqWQvK4RNLaH4ZVAI
pgBplmTZzTl/Jt/tcsIK5B3n9EMcwfkcsRE4eZpfi/xs0+ozOhkoM6X/4jQ6mjFdDo0cwun+Mrvagy5n

3CiCOck47ihKjOysSvCE8hKJXGzdjK4Xo2V5uZ8RaMY0PhsWF5mQgh6HBuQfq/xEyPp5+7EtBwh7Znag

EMAoMN57iyZi6cGIi1zNXB9pRV3Nx4Yy+KxgsW3jDy
0F5en1tYtR6nZiAG2uM3y3UE6bN/ZrqOmTyECxxgUo2QPX7dL5ZmqwJy4P6cSCzHVZxKQQ2Taz+eU7ZZ

mjwWJsdGSwDRBJumjbnSmJpIIcyaX4l/F9e1qzsR+D1kRQmlloWzYQqK8YfWeTiNu6Hr9sHPIPp1/of9

LnYZY84FW97+aRBq5cQXjtFX5/qL0pbCP3fvjh4iJB
0n7Ih0KqJLyJTW76gRpbASXqCYS84XeeuFiPJLSlZg4jgK+dpwQ07XAjQvzoy4t2QhOu6cKvFOZK7W/c

Tbq3hke5ImC8Ha7DTELRUU6klRCpcjQRKz5031YypMysDuSd0Su2h4NWUdsjw2eU8ThTsqWm1Fp4CfJ9

THyRSPXMnLzSBpLyK21fLBGjLlhQduAdU9KfsMO/JS
EchdX3VTDDDWxevYd5+1vtK/Ajo4d9rwYUK0mBHf0HOTMErQwg1E+pbbMh1A/Yl9NS6MxiFGztiBQMiq

R4h0EaLcEzdjTD7ATWDcEiAiU5CxFRUs+MRfbn792EAgkXxBfYgmGZl6NRKUwJCaw6piEOx6m5E92uTF

uAl5ziHdsuAn3hrhcwDiVS/PhH1mI11XBOErh0t+GR
VgOcK2eeWy2zJEv6aNwzcNjlA5Nj2ydJoEVqv6OVfkaxZwmcjQeMGAV6bC161oTDRx/E5yOX+sks9lJV

g4KDL6w2o24rJCe0oEF8QSQGjeHGNcKP08HP/YEeQ9L2RF0YrNzzHGCY4HHV9fmsowtf+DoFt2Dq4I92

FwhjYRYIbgen2wpw5g8k6GzeKIyzxxY3Iccx3GqG2D
T3uEAsrfG02QM08UK4HpI2iKOmqIFairq8Sjd8MnurTUMPviC9MvnASyQKhDrfoUjZuW+5bPXj9YvQ8m

BPIFtbraFSRMEThtwFPKe3OfGammG3sFcNTwsIqp5OPWN78k3VVrzSoOJC7XQ1Acyw5XLli+23XF+OWk

Eu8oSmSwhqdw+V5saJF3M769FgDmR3TF0nbTu7eiJm
8kKxQ53kyHpd1nhQfI4itrBR3bBm/Iir7voFmbYkSBkZGl03QJueE8NLuiCbVmzLr6gfNjONxC7/J83F

g/uOyi3RksgSRo2BCdjSBrrQZOBheYi+aQnSOgdAIdMWzm2OulQXhDHxhex6Wh+RaNoy1vpZxvN0xM+0

EVNGSdrtwh9865x37jMwpkfJhSD46M/KyUdzqP+5u1
z925rHWwWnEeKy6P+8wfY8YU16ZIn0erqlkr1Cq9oS7wLQ56rX9M1w4BC1n+GK8ov4nDfv4P9S1xm0cS

gmw7ykPKdpTFY+kNdIlenKFlqNBI5kGhxAzyegIO95LkhyzkwZUa4SHEx1IMfL4BLXbxEpoXIhrfLY23

OqXBkejsND0YxSP7Gv+xP/KmPzK1odSdoFw2Z2b2Eh
AYNo4pJTDDMjyFhLNhO7QC5ZlZz/4WaI/+Mncm2BTJP0JU49xQgTErFDeomqcrSwTNmoLDXi9usXzKWR

k9TIFjF/Q7HE8u5ktsy+OxZfzzrmizeH21eB658Q+KiY2jfTfUZgFEy7S1K6V6OljLtv6AJM57R6eGYI

SUUQC4EPGB0uPY14riDpsBLYFS6brj2VLB1wDwG6pT
4pGOhV63rGaD+8VGNiKaUBKVitrc3VdCpABvV4cIBOoSJeNo9gO441swd0ePliaxduhKfQwFFSZzv7C4

OxqkvF5alkfGxWASNvw2+0cls8nLFGUkNrRUfiVxaXxKmoyrRC2BXijFPUe8F0tcZ0AEvta7Ad2J/mmx

eqECCJtopiokfUW9LxIWbxKFeprrRgx7xlfsoQDIzw
Tpki1OZ4R+V1eu3bt/gCgs1APCBdsJ5qpRPNdpf7dvTC0ruhTaz6+XnKDdt9OLIDxWSqY0CVslZhbfSr

0DQ3lwqVslOXEwjbmd2Zf1Eg5DIOVUIiVrJeKFkap791lrZwqBuHqyJQ9ki8/ySvp2X3VbssakOX2CSe

aVoLALn5GL+7iC82RRf1B0OMttt2kQP8ZYnaDvfHsJ
ymrgqxcarhvEINZlO+rJJy1fiGuJi4+3GTKcOlI/bKUnhoQkUoNGJnL2psOKZcEt/xrIFNuGMfWezNCe

grFP9UEh5V13MhaBLJQE5x9vnoi+pXosEWYg1YganW5xvSjn0Op7yIfrKKUq3kyRcZSHu4Qp8WIPuAPA

N0792W6zTImU7S98I6ibq2/DUPuFXrO9BWnmsj6Shh
NZmTv0un9cJ7p7hAOZXPE3xIU+LzPISJBfF1sasxSoqugwfTlU454QA25Khye8IoygvjqVKEM+gV91rG

i5gflgwz+Lsf840+Wuha/5u9NfIZMi5+Q4DjBAPQUd2CxDFazaO3MHLVsKve2Jrjn25gHw7Evx/PIrSJ

x1gso9vRIRmhwsXycIdQWsygCsG58hHPqBzlE7EGcq
KrSDt8siReAKITtvLetx0F0PeYv4cpCmy3z5zM7N2eBYmwDNS1UJfN9dcgFVTBEwUxxJ1M3PjxrykRrz

lF3tA0onUnr+WMpjbcHLQDZ2FB6GNCYbtXfQiWCE6c+l++xopOkchzvDA9k6k9E6u6ZvIiVlDc5OWWO9

rwP/efEHxqKjoYy7Tul5CvglJUEFQjG+qSrlCfktKj
U9XBfxXy171+CmcfdDKx5A7yzZEEox8sWtth1VRDnrAVz/TzJK1p8UygjseJQycKIvMhUeDci0tXKPQW

LjqIhY5FX8W3YpTom2gsjY1FxnJik0zakzI7z02uf1fvFYqtvkemj8hHp8qxg6mxbMKs+57RSjXSMkpd

yg09s0eho23QfqqzuHD2GurW9hXzxW/GurT/TpWAZ3
mawuz3CCUzMqjQBSCMELMzPVuC3zGhZDF7h3bgBF2mdq5NzShBaYhbKLtEPajpifDayEH1nbANK4SZHL

dMbrUrev2dyJKdDKBcMAqmy5+B5Owq8UA+6ymvjy9pvoO6O0qQnozf22B40LAGmIXPmT2568n+j5RPfV

dPIdenZdlhRWfDRTfnPVTB+KxXuG4fhRO7zfKU9R15
3sTsvqoBdeD5QYKBfnExCTrbOVogGUoMbUekZwwYBhKLKuJk1oyxyU0wtoBzatqqd13xZyE9tN2rJoPD

e5c9MFO4YJA+S9i9cFUyCCRim/a5UyOZPC6AZBmfL8+huDIAUYq57p0YMkFuE0nJ5jB9rrYcYAyILX8T

THm/4SB+L2NWlzhFNsukSzSh0N/aHd8wRVp8K83d1T
pg10bZmCeW6vfWmyf2AQ0cfSgHgK3/xNTaSy/l17s9z3TmhPJLlGtIafSky4DtSZM8AIBptiPpfVXqCc

yaON/j8XQp1B11z2klL3BSp1f/owXmynIh95MhUouV/FmNRbobteLI9j8r3U53aVFhI4pGWqqPjCnHcD

L2TLQG91zF1gYJza7YASHSh6L8DuIMZZNj1S532ZBS
da2PqDztPeDggX3yHx05dwMA3qs+++wHPemrcmz9Q405P+GxILAI17soGB/N7ReWkY0gfSGQjqUEOfHr

lpZaplSUAtmMzuWpW/tmbUdXTfAcQlUmDuV1GmKZfyDmNbJqIVTtDbJ45Nt2u45wbT0jrKDd63CrUimU

8lmpyfN64u14WOjEnrvJhKON88SurYsbqNN/G6FTL9
wFNnQcCbcmAPupOny8veUFShuCJywMLbsTeHnPLqSnjyy8z9NvC56NSjLYhaz+zThYDPXqSu9zZrGd/L

iSxq9y5aSgv/Cj3MNa2/ZtV+fRrBCUIejOKRtJA9PJO1SySVzxSLHlSOnuajxJE3A72CwLfkU7r6kY3b

8cxozlab9fvSxEIqb0c21jPRSR5H4em1YLrd8nEaGC
6SStcIt+f+uhY5dqW/a+e9VdE+VMcbSHFPF59cRkGxwqqE8TdDvCw4OVzW7c4kyH1hrEEUrViQN++JJI

3b/cnO/whjaR5JTExs5a7HwmzLYpb3HTNwIIcMkN6h3wYVwpjcTYlFVIBOulQp1hT+CeXZUkM1quK4yr

BRdWomSqYl0Pa0BvejPLJBnWrBHNpDNc6o1dTuF1f6
rwtbOFo52f014A7I7xmQ1OOnHi0XldS09tQPJU72xpF6bBBVSMdFPgReAmGMHvjJTCOD+Cmnk4qQMXwd

PbvfVFMJEZij4zW/YC3/j9hSW77NZDUfbuB92xpzpIbrfxda+msmyxSOiW+TZZRm+iTocEuOGnBa3D+4

ltbz67HrWhW1QwE2fngS9QwGr/yrbUr98EKDGvn7rw
zveyEq7UqLrPHDRJP+Pmc0QzO0vVJaVtlAMixrOXMeak0pQAjTFlzUmmDztC+C5kUBhOKpTGAKOJUmQq

z0p5L5xjdVw97ATBgkR53MD7nvo+rLKFvT+bZ/RJQUWOxCOAz2jlgYooxQrKecBPU9z2Iq7PWw/q4/0U

6U5Z0QBld09kKaquNkXWFuNnEeaStd0by/Vr0/9bpJ
YZAYhPhIazINzU3yZHLs8x3qdtd/aG0M3wh8MTPYWsrWakkDIgs/3NbhElcy+W1X+z+OOXi/kRZRtROF

AfjSPgpX0cLguXF5sCr3QgywMOJsWg7LAbDBMx0Z7Avcj/zySb4yLBxhnwGHp8xI9/j+2SfwhDR3k8CF

yZmyf+9fbYdXbNVTNN2aUvH0Gf4BpJTAv0UmZklUzC
ZPa1wP+jW1hCNH6t3o5/GCJlGnqFnCzD7zuYR885QoKClXx136WEqZJG3yTwGbOHbZDB5SApGN/OhS5l

ywQLJYStygatak77rWtQqTB2425qQ04WfQfeb9mTV+qpa2Do/Saz9Cs/9ZrwqXGHrLoToHtVeyW7Am9G

VZVWvsSpV966yVNhf4kio84VEFKp0p/IoAPh0PNgi3
jxAEVaFgVHAGPj/BsrymxYX/jkUtE8rr5GBgZQwCaSL/Jj+3lLqQxC11U5OOPPmZqy1/VPyb/HWkCfR2

vS8YuXCZ0fxiPmdcV++2EBo9pd8ey8Y89UXuEyKuxMnSX4ZhTZkQRjcCAAg7ii2ONtgmW0P5gJUFre5s

xEa+AzMR2HaMWoiC2CnkYWIzgFaW5SJX30QAi2YgjD
D6kftY84kUlbW6/ProWHK2iHLbK2il9n0lFAiOKwIV+xSNDux4Zs5UYkBvtkTnq2S+0aChgxVfBT1Gy5

BCB9XXK0Az1G+7I04VHAogT6byOWdg9xoHnnw78n8Ure44yEU8sE+Q25nCfxVqNvawl8X5jvxrcTpwZs

eoncFCpiffGXFvbgstoDsG1rtyVQDm8I/ufwGTYc8f
ZG2bT7m1Alir1Rae7vfxsR4s9jWsLUEBf/+6DHZs2bZRQNYBLRWdd3Ywoph78ok02MYx3BK1I4z+5Wvl

vmiraewg8p+PXNvTv4W4VVa6Ihgm+tjMmyS5QfbtpBnhpN0Wup+hhkeJOLQSK3HN5Mz3G4LhjQ+kpfoD

3e0dcfQPMLWf70rqQ/MElWjj/OHdor7Pub3OcNAY6D
axaGhg3YUy0SZBPei4G5rF1Zv6ptYVjqXm6pxDYLr/VqD99zHePyy/ifZuAWWLEkYyrKohdcFjjUtKBV

/Rm//M+8ETeyLf45xokCC1+RzgQ9aKbM8T02Z1kb5xsgIIrHFZtFDYcRYio5RtphfPXZ5cSybARO/tRQ

NapzzWjJFryr1QRRhZQOvh5YWYz+PAd9NbgPh6521M
8I7MXTiIVNIiUKwTfT9p0Z2FiFRoQpnt1yWu0G3tEJPL7l4HpucXdwFk3JLnQv9BDGZ4UrgYl5xkMCuX

JhJwf7fBU0Bf2cKZX32afI8bNyEoLCikj6WFLrcdCTvEKU/XjUAN2Snymfiy8Be1VIz4/8o2eMlhZDlP

y8H2qEQa7ix88ZGZybILbpSMZg/uLDwoCzcX70C4sN
30ZiG7O1OWKP67wue4T1dHQtIKtPgRJeyzm5d81OFWdfIiMusWaCmUdGIvccDZaxy1IvdpBW4r9PAJt8

V0AnoaLqURMYpNwCbLswxL9I5AvpfydGmGAoU8mv6egCeYl2K0rNxQTLqWQqV2jv5drmSbopQEQqhNOA

cMqGEN5b/gorFfRnXrjwAiwPwa9Bj+9hK8PQ3k6XAL
Nbj9EMefzmsf2SbI0wnSXnC3Z4YDew3pzmwyaL7xCjn+wHLk9gpdJGi1RUUC7yZ8VPRtSnSFGc1iJJyC

AwByVUS7rJh+L7Y5xGe/FDlvhlTUSj80sUoXOnYSCHgGCU76d4Sg6f91nj/HlhsF/JCk2PAF5M0Zoc9c

qmW1puf3i+uwy7Xv16bewK5I2/vHDPpnnli0sSNGSe
sn4vhPsk6LkTZNVcwpvfkoke0N3Waf1ydfduSv0ly30TE9oxskvnGaD/vA0EdrnQNID7HdSz4YxrXIwI

3p6cmjY9XCGhdc2uXz28GSJjllEDSjphCUdCpRdKnB0eFwl3uiuCI5BR7gaiVeCdii1BnkMpO2VdKYKN

m/JiqIMXaTAfeX1ZJPAOhN3mJCWmUZ8MxPSjc0hx1z
/4ggc5L1biKPethRsjHdqXpaV1cOiYZa8fQhBlyqdQgc7tS+wXBc8Qo4s+nDqM3rGRu1JVPw6t4lHC0m

WuPaGvyYmMgt7Q+rLQsaQpkVlS5LOLMgcvA2yPlGS1tdGgIwPfTlSIjb+lj252a10Hb2DaJ58GoxrjHF

TawiOt9nBrDt6d3r1XTaE+XxYwLt77IV5NKQgfx6VW
QYQXAaMNxN6+VgEtSY94MJAtFRgnmIOMoo99BplwFaBp1wcswHgKQYWRpfywXcTSWnHnhtArzICZ9Q4E

oaHH5oxGZwRkhxZluNxuFY5kcSiFTfS6UMAuEio/f1gB9t3zdJiRFrhZRXf8J184viVPxcxhpIyvChb/

xASk3rwKl2CVORtctJ/ThSm/Xy9lscPxuDjeYaGxWu
kT5C9EqNRFkR2exa76KmkitokBdJyQnC6dv3vWSMxgKmP6fpSEm4VPcN23ke/HsNl3/t4kU1k09L48H/

JPGbsumYljtt03iQFnRTzyyNOvmC8bMir3oh6RDs9wsLy9qKvXonDp/bozi4uY6lSezqNWgYz1sLVjHE

AXBAhhGaXdD2ffI6EcLskkzeQGBru1PqkbppN0j0h6
g1bJQd8zvz2DzNOUiD4WiryD0AZuIV27jwF2kineD0OoE+32YYK2ntZz/N00ufVxEBHkhYNmxLjp8wp6

kTJ65sdmi/3G6eUV9e/YDCeZvFdP2JaSf6IkeuzjAvfOq0f8k1PS4vLx7buiznUoScBSNwXzpRfSZz0s

EiQL4fOWiqFto3UT8p6qDZPA6UbL8Ypdb9pEBNC506
QXVnX/JVvYf2gueNouaPBhgHgv9No0vtWjq0EAxGdChxMzfgn61mv8nonWvWLBCgDrlX9vh1q4gW4Yv+

xqDrbyod8RIxRX0cbquqCmajAFG7aFfQui6JboTgF5o7SbNejO1/i2UxJDFtP0iFBL1qZRUSCYjQ46Nv

ZDM2/zwjlLu9Q40hq7l2UMWxbDN6oZPDBk5NCbj98o
FMa14QuIjpfjpkgBt5dJJrwcUAbJhBBj/ne0q3nw/kwOJUO1TJ3fUX5F+0j2AKxUXaIawKEupR6g1o7T

N2HfV5tsaTOiqiRgF3Jih8wQDILthBoXI3Jb7zNOV5VyAE7c/JOXX6DDuD5wbb61+Ff5srXf/GmnVQ0g

IW8JM5thbsndTKu5CKWnw2mTRZZFOrhPJHA/pA0to9
xhI32PjJGPr9k3uc5IYHlt0Sjb5Ak0XPDDc4gN9/angri9HgCs0y9ad7Ud+xqfwhQ/gkG7b2tCXlIjOh

ZuDRDo55TPbnqSfT1lvenumi5VhejkZvUkYH9jiVkO4g9cK/SPz/glF8m9cWexf9TZ3YdQqswGsUZHVK

eLSCPKMm0qRYMZWKtKAazspne68Csq5+RobsQ+4WkM
RgD8ryqRjGNR7XkqvhhYr76C3zRaKOf94MjuUyEFRY78Cx/XgN8XB/RUIXWeU4i8Qgx189ObY1FrGIvW

7Y8kCTSn9jhh+ZphMfe7W5OsHBxZfAlu/RIg6CzvJn9HLK5PdtWR/35ksL/Vr/MbFZ2icLAF+cIUgIWW

+iopm+ZmX0pziJ18IXlvCa7YMxJywCuX9EOrBr07vE
4Fh0sT0U074XVugNPOilUff6dmm1rd0YWJi1jDXlOFxgjcW4OqNnuT4R9Q7Av/9Q6OzV39kWoTEO+Kellie

mfaKDY7Lom7fsUxUftPNF6KYlMo3TVBgPlPPNDVS/d92Lbini5qEbVmzvcxokn8iYrdXqUVMbRLV+b2D

44J0BrDy2SeH8beh0Vydg1e3VHSepW+His/gh3KaOY
lmQTEoBCSRoxHEUGTta4eN2MlaIosL8Ot/uFPPG5ZFxyPkSoxJ0SBhU2MSOEpRL3ZtWNtuLt8ZexGXme

jRVTXU0++2EwFN/pQ8Qby2AUKltke5vuLLpAUbaHLUgIF3cqMoqnAacU68aBO7dwo57i1M2NfWIezDtE

E0Z7fjUZu9+Aln3f9/xYwZAyRhfTeRS6w2YujBZFdj
nlbV447ApeUQ8uzM6IJXGL7x1bAtA5HvQ2IH2NUPLzi8teGItNeP6tZ51YzGrEhU71o2FvvCRzeRDlkJ

3Zx7wnV2QIDgXCUHjRn0DFZ/oaihZY7XMDAyKCfeQUSLUSx6dfaFdyxKcpEGEvcQMG/4DDMwFXHezhC/

zKhru3sCzv05V612cGdMa0C2yAUeC6C2L6ZusT97MJ
+fix3ueQcET4E9L1qEkoLyeH1EE19TtVoBolJ0L7BQWcMHpcSkECNo90o+YjFr94bWW5pfG4fe4Q+T8H

WF6xe/sEfcC/2uC5Zn9s9TXpIXwGDy26MInBnrLhe9vcK0DzKUa/MQ4ZjZ6bdR6iPgaylnx9wM4bHUSt

qhjORAMwVjWxw48ww5FpcOpMUoidAxqHO3EnSuKCrM
6oeu69kAc894yqqmV/pW295WzyZx5152/eiXHTwCA8/PRwtiGp0GSCpX+0ZG8KiPfbDdZNNO+ChOHC72

P9PWXfk320x38DAgd72TcqM+p1WzPgJwX57/HzWqc1dIpZ4RYJJkemD9+jw+pByj/FjV06EN+O9oka1C

Rd3U24CzfWbRfb0WrBJFVeHry3CwA4mqDPMR6V67Hl
qK+Vc4ayF3/1SFv0F7i3cYkx9yO6DNN0JyMhihqpp8EYqyx+JTTIp3bvA2ZIHbBb/mkjghJ1xnl6GQJt

Hljr02TE5XjYSxz3dugKAxlw2rv/twx3VX5srbjbraeSbgoYKW07YYFHfZkm+yu3fno8XfgvG/a3SkY/

K0d7hU+inPQ1rabrLyYaGtQFDld45GQCF5z7ehXmxg
9KrcTOHv7mGjezS6plIr2k/1WRR3a7rwpqeb/5KVqjq0O2x3anSYULEIc2XFlWmSJgu/2MZqXqP2mq3W

x/sWwW4WLDToYtmaLjQzqVRvZyJyStubeZ7OOicwaM01rvANNovz1EKDYiZCUBDoJwNj+7dlupiIvYo/

JCqT9+mnt/8/dwqkXW3ivEvRHS6wsUIWkwAp2KXN8/
/iu9pUaumuZuac8ep6pmdosO0hxa7/VaNhGYjSPDNlTeM1dBhyVwNdxBH8iC4H06/qVknxuVea13TFER

gC2dHCf7xmI8BPbgB60uDRJUuvjhKFjV/Y467/wvh6qqA3G/0up+PDLSrNWefqXp2tUjRYMv2W/95tTg

UJLq8R4qrwO5WIIwpmL2/b7L93sjfE8XAum9WRHGnp
0he2jifxDVzXdRxpWQYITQYUhHv6rwb/e8KGwf0JbZ7F0nvDAmp6Uvks/llzn2j6+1xoiH35qdWmx+uD

yZU5YcLZ83RI6Kodc5mn5c3ICQUFQBNJ1diNK8Ld3C+SNBQ9xwYle7hucCIRiObl1GONR2xjPDe5ciKj

acl+Qe+DQyV9mzIhlEQnFgvlolJLMBO0GHuiTDedum
QXsB5fTxdWr4yyrxIQB2zkWD9/yp73b3Eq8uuSYNPy7kxan6htgFSFUi9h15BZmwheUD5/SzULAB6RLl

I9+puC7ZcpOAUiO0RuITVTk4+eI/ho+/qsZeVKBgh+fm+5paV2FCR5foIX/LMAfMufU1SzaFAEwY5PVR

m5UNHNEzJ3tmGYnHvs6FgDIT5bSZJLeecZvE7Mg3d0
JeuUwkDA8Y/9s78SA7JM5WQoMOBaNgvH615zHvRsZ8Xk6GwfiimtGRrlKy5PkK5n3/Ym9rGpZw6UV1Gc

WWjZAm1/ZlUHhOdmxxx0QqGYx6dQ0mg/SGgKTG+aNxiACe4c4pyC3BSLPf0Kr7xcYWHui39QKgo9e+TM

YGX0MDAS7jlCRDgxtN99IrYpA3CMbzD9FhwMm8lAur
3Nob6FSvj8fkWQgzjr01Lijj0qOe5q3oJXqY/lwxf5gET9ITC0bRMvSOZMSxFbtKioEsNp1nLHmozlP6

NXhomkx5ycBxzMWsRb6G33qsJ+FuUQQ0jYjEU0Zo6RGu0FVLY/GRKaVg3ar2NjvS0AN0RU1pcaBMPnQw

cZEtDaNLwUD2YLKN2tSVbfWY2/rEc7qvpeRA2vEaLi
sB3NALOTVpn2VkeCYEvKDVoEtbU3x5rffR7s0lelfPpqe6VTgwCCMvz55Ihs6a9altVbmj6WQCSzmvV+

Un39Zw8X3HFwIQIRDK14GybmjdZM7fbHBl2Pp6IxpSDSVfUZ31H5VNkHFpUlKTk2f2r30qWTalixzLrr

ilprrKccE/3bNzC8ZD6kKLNJDX/vPhJd6gBACtVQF6
oYGlqjPXnm3amq4eMU18vw89G6+egsWZ3py4kH7ukR1eN7hVz+16JdQpakWyNEul4uaDTvq2uYWFKsaO

hiSES3xgakOaOIIsLAj/ZVx9ptJyPBX8l+Ak9lXaVPX75kF9PH/BFXXwS/n13cW6JMlb+c+m7I5NdEbS

YuHDR2ygGWQTA8yBnVOvy5KxvcnhIRfqiRD6zSZawf
lrFsgnQjuKRo8K7+UQV+r6DeJIqp8uzHPD3rLrAeBAwMG3+dv2+Rdkj4mD6oJX7W4SdPdZpgcRH1Qs1e

TrCmdjLHuW0CFF0jpZtg6c+iLz+InkwrTry+HhcK9wwTMfZwch2Y3qne1w6LUK9Hy9EAPKO8GNeSie4/

fcxPbfWJ3Zbtkeibepl+6rZT7p/zvqVKMoQ54HM/hj
FCmb6tB1lk2yy36q849wO21g6mlPnGG/HpqYfSworDA1RfsyO9Lbkeqs/gHMXu17ykrt6hrVo2hz2xwP

InENPimWbZSwTJy0jAu1Lgb7ZCLQVH8IvRFbdCBGM/CpokEEY8fw5GrxHFTsMALtBj1pG3evTPsmm3LB

3Qi7DbHga/jrRD9ui+T+hQGQbSvoZ5p1xE7fTJtm3y
aXMqr9edLPA9EKnndYi9JXxI/mM235R1HlyUWNQjdL8rVV2qGc61kKGgUyb1Ee207VWtS80XS6FxiDjV

p608oJbGrgTKVpgsjYFy0bhE3I6f0sfu8/9OS7QfnLOneEKXOjCA1XauKdDoYiHp/ih8FAxHDeOiSLid

8e/H2GLO+BXqa42qbReUJw+5+a5Do0KJFUondqgdQK
4S0S+gRAUco8dd/JRoZBRd+DKPNaeAOOzinl1hMk7Tq+ZXJnCkakR2CW8Cp/EyRTJJVBoPuM5gHw3Efk

VCqoJ3HbjWD35FEJ4wPliz4x1BcAhawi3SxZI52pLECHCLvCmPmC3KHGdf6imCqGi7SC1XXKlAthb7Rg

XtRurGsCDT+uNszqNgNv8gzW4uwnf3UlPTYxPD60NB
GyhXe3UUbqs+txJCh61+A6uJHIdAZDG5v9+cAcF/QKaWffdnRir9hBNc9HBzkr2psimQswRHpiTGrc8a

TFMbcj03KW4mG1xMG9TVRDgYcE6Dp9h3fROZ51SaoOQCUTiTc9xl8bud0MCRt/kF2uM0E9w9YzsxrBql

U2vL47UzDKrBKNLweeMRNth2HDyfTyGgjOe0QyDLjQ
SROOrLp3O0460MlkyDCH1Ng4QFGGKzxJwifhXRKvUELPli+NgzAw3r+5+I42eSueoCXAWOt8ulgM1RNE

l70+C5jVK2ECIVjyCtGxN3xW0Wc3U9UgjBAvvjlkn0hEdyIabhbOHtRnec4ubMifHqC+cuDWJK67wVti

xdGZ8MwDvE+v8VRsUr5hphDxZ5YEr+FBDH72SYMPiy
BYeoo1IUeefI7Kkk4y16SNKt3/OddNmJ6XNWbX4tVFCcE9FEDq1RYLmJQCxHK3xDp1ktm0ZZHyVZazYW

XxaFzlV8U9IX/Efui2cASaOXe2453U14WaPndfybDun/QPJG8dP3gqTR55pwtSQ5zdjUuR4DLavnE9Vn

HFgFLJdN8aS8aoU78eTeugKQRBS7CXw5kuV6WivFPn
QMEy4BsVy09ET7dKdyfyGkO5q4e2L52fhOt7+UouKchkCSPqRzu0D9qe+mWM9zq36dFOb2hZtGtdMExc

i/JBErCbaG/jI5FgZjhiv6zoDiFqhonNHxgboUZIO30XnaDv4YQE4Z7Z0lcVGM6/HLd5vT3S9kXR++NZ

x/Ppp0MT0P56mQhKxiZKMw6G24Rri5DzX7HL4ciuwW
aao6WxSzvL/ts4afKM1S5GdKH8W/3r+RK9gNg7ynDI2DdEyWMGXAkXSkGHQ93HGSPQJ+L8xEaG25Gfgk

HWXeicjKveChTvqDXvZmn2DxEMFDZX85+MYvtJRSj3BqNKYZ6CVVPRB98VWeuMKNzdx5CdKEihJj+sNS

CE1c8n3byFHRdrL3R0n9ZwAZyxoJKiwH/qzvnlDl7u
Pzd8uTbtLWyWSTZ0YelhUWxBYNKyAguMCJjcf/ghj6N84lf9p3hF9g6xPFO/WCo8axlsx5CEu7LI27bq

v5G4yPhrJElLs+hCe7U2RkmQN6PmwgqoHRmZJM2Mt1zw2dr/NTF+BgKomqeL5FMKssC9hz9QvW4Ad7a2

oNJc4w6usyqmJkk4pL3Dbsj8WqgMGNc9n+CA1V3iYk
5fDYucbef8h2dFkR8UE6zQsmLgT4HwH1A4NixkpRPHEgnl63n87L1cFdBOVu1ft3yCMcvQJ/KVm8lmst

wgzQqIlCgs6VLD+da/FkwLwx2TtPP+WZuQKdXU7DfjKvNef1oAq7h6c9KId9TtwpXl0b5E5PJI0nhZQN

5EKsk+3+HFNdin2NreECHLVmrayw136mO9bZlxg+xC
IqspHKfBjn2SH612b3RjgtOsCkrjLdGy6aEgIR7KnGXu+gU+AnP1fWp62urJEdweCmYobGdK+aTftU6S

CfznYgnJQVjFYqKQFW55UcUbztM5HYYxeSQL7qszY7ePmqvj1se2Z07BAVT9wzlxGyz13/StS/j4astT

CVot3v6oZ759UrQZaoaUmNmegVyHggRRSw8GyRMKu3
IAcidC4+/5pecubgCB4E8jgYrPOth6aGl7HKoKjE5JKnQmQEwuWY1YlWtCh15t7QBK5/M/ZNFeS/FeO0

2zhlvRPmq3Kb+EZdWsS6bbO8zrOf99F8rqL2Rmg6alnf7Iof3oL3wt+z8LYEb1MQRuNdGihlr8JlXNoq

i4lNMhOQfPFpEKcCVW/1D12rzXstux7rv6uMddx7s7
wMo0OyBxCOrGPBZrQfq8V57K43G2oT4i3JKnMVsP4zBNCnf2B6bTsyglTqsmbbq4EonbwyuZ2IV8BElJ

NsSAht3xbDtUk4/46zrYXONrqyUOrMN4gngUDmcIxfQkU6a3aP9H9BNJQuYTXR0QCouL/SNc9d1Y2X5q

Ev7tpLHAinllmoZ5w39bqu0uIwjv8ijCcu8d/eu8qa
SBHmfGuWDaUKo8sWtBBBiQik36dg8dv2hoV7AI3b2UebeJsEEH0ePzYf5f8Lb9ej88yuGmmz9rC4UxuN

fWVFysop3lRh5AJu7zGBJe90EIG3s4aCprhxG6DJ81wEr0Dk5Z3XbEHVm1dyJ5BZAHw7xtiWJ3xliuwd

WZ9I8ZU+79+4xS0qn5/FH3Pf0A+K8lhcXGWEOkhu3i
lo6V0dxmJcXTYJKjZRae5GQVtP5QxzppkejEEHOELQJ902FXnkuBEtkTFIj1d93SMZ+sznbPRtKO7g1X

FhK4U1BazAyuOfvbMuJhrDVTIto0BX/8jYoVTJj1UPm+e+JujpwEd0skKLx3lM+bKgts180rB34UadZC

I2I9K2PYKbQpXqTRRaoz26MkgZinAs0C3GbvPzHxTx
uLesm/6EY3aQcfJxwcgQgy+NAby+SCMYDhjQtgxTvu7Q/t/fEgEqFjZrlaWRPWfcGdDP0RKM3EQS50Z8

HCb4TeAI8aov7jeQhxh+kcxQV8OlbvY3TVfSzCeO+Dlcm0phbKZqxmMhkRHAwPjtDYd8uFl/9aE8KxAP

9io1XVT4zETjLirRSltcuINffR1qfw2VmFyYpEvheP
z1NhY9uJhymLM5Y+tpi1aKG4sc2qSv9ncBFcqsVmbRhsqS3AVo4HtY5PPItbuzM9oUGc5vtcCZj8xpwX

/Oi+1NOYRxKxzrCvVT9nokVQCJnmUvszQ/bx7YL3UYz4Ujlpae3psiHVI4nSNYGO9vVHdKxhq/pEBQ/d

VYyZEXyos72Pk+42Em5KhNJimK6NZ7gp402ywqEZy9
4XxfJiw1dHVSQx7ZnAzATeXCjCb1ySCPY/rZRbTQVl67QEAD91TGQAIKyyF+rdI79ZC0zsEk5+3C6Ydz

Ftka6muPYr5J4y7+4CB1aJA8yWxK7uyUHeUSwf0S44dJXjjnEJlzzWb9I5iqPjhHk5GmjzaZmyV1Rrd2

PpTN/ciKz7HKsHrE5GoiaKhk7X0L/9pR44XcQlW/kb
GxocHGfGjv4E61YgQLyYpvehtj20Ty9SsI+aAop2X187pyYFq4ttAOuqjEFvkBqSALfvzJMNA2Z1KOu4

PQ3Vw3Totb83YLKv7UI3vgG3Ocj9sVbbRsY2dKf3Sv5X+FRISOU3R82NofCwI3RMGNJZD2+geHfswN4d

M+8y9/BmBwnkEeyFHAVhJYvSs+UCJK1cwvvPvxK776
iGdlN2LtOkxQ5510m7fUanx7UsGUgHXpfr8h6juoo/ajF9LGOWkSnNSHW/N0xn7BIy1QLc2eyvW/5xm5

DC1bfz1BwhpaA4byc2tZ/GinCQZUOA53U2Yas69WHy3GnRm8nEjE8wiN4QyPCBEpF08nbAQh5B0U5Zjr

TL4JXebAekKSl3EilclV9s/DdWyQTSr1eNlsfhujtC
zpzwJXYdobMaZbt7tDvoAM6QeeDnl6255WZroUR0tGPnfW1l2vqKLj81D6AY4FWXPo9MUy9IbUM1rUTK

7F69aFNVuY6q/Sq7j4eq+uOymr/gcMSCAtJCuyK3+acwcORB/7MKDwx1U5BGiUNNdt2bc4m78D0XGeQI

ybpTEqPUtcPzJ0jxaJCTuuirt8nwZ5kQ2P/9YV+xM5
56MVWhaAa0cvfxH4PQeGR9zG4T7FgBqFn4v/zIoPGqOV/moore+x2i3rCmxZc6Ih9+a6F5j7NslxJWSDdho

gffLWto/Jose Guadalupe/d6oOv5FH5efcLO7ZUhyQrB5oIp11ZgRRapS7mmbV0aBordC5VshEZ9nQ9Z8UDjWW4ukod

Eh7whFfVuHmZdPorFTQbmpnIXjRe4wQQ3TzzhYwMX0
hkFVG9QPqyAcW8BbmGySa6snYvebj8rQCakk5TXzJFbrXUh7WE6o72+qwHuPOXpkTy0QBEcj3GgDUqIC

hSbqu9t2TeiEJJf/QoWqs0R3RioJCcm76Owcy+czWWpEl2f+9e2HqpLNhwS7gPsT5D3D8D4D2h/Dpw1L

EBWo4+OAAXBTPW0EhcPpeI0OIR7Tn9p888Lk6Q5AkP
o7CmumonrYwKQhhRjBvVilEmzFcc/zps3vqZeSu8ViuVbjKqdtIXTseaQxdd/GYh8DF0SX2ibIvt195u

19Gqg51PWf56He7rkWymqrPV70c8xxl3i2L42Ht43bv/BYktOCg/nQxdFddeQp960g3tlggD1Yx3ZzSR

p/lo2RNZLqcJLoSlijQ1ER3pJlavmxIlKU/nM2t4uZ
OlPGxGfb+knLDjf4q5CoVkyH8a5OokIbDwp6JIDDOIrI4HEcOYiGQsk1405d3wH1VsezqvwAWK4fH3mh

5Ht0N04VRgL8xllufb5ubuU+l4A8j7aq3D86KGU7whSw3kiDeDUxPWnmWSJRmskYYDlFuDgCbpayGpYv

A5PnaHdqxwn8876sMp4epp3bc6RQ3wSOt5+I5W7Dvp
QGYghzzbdSuf59pol08Z5xl7/zkLE5M0NhSZ5QgNgafR+Q6VO9oBEQILeFgiF3487rov9fODCdGZl6SU

RafLZg7yTMnUx7ZhHA+BCiq31Dyd2GBh2SL9mdYxCqo/OZMxZGW3QjZxU+Fws/1mXTkdMaDn8kjj8W8k

StPNSHkgTF0Yjie7uMvM/hVd+pxrulyxoltblmWgKQ
PtjxxRi9ozVNCuKDLTQe91Fz53qYAMuq8xw1uK0ZuxW9Ikamh7A+42+leAru4x9z+hGr1INnQAs/C2NZ

4//9J+df/C+hNuc+TD3eMetKXQX/PUz8AM3VAugel85nwH6OrTdtFGG1uh8fijJR82HsC9RkN/HTw7Tm

74GsSMXj95C8/LEXlFuRR66fkBMz9FJr2zXB0GMQQU
GYoUA5CgoHEsztXQyO4ro1X/ILlXE5tu41dwsEIHfWM9gb+qQ2Y8/ZPVPLcLtmxHhAdEZPg4CgTKkDsb

sVJEz0rKrrdt9OR0CBRI3y/slQHDFhbdx1T6bA7pwrRHcYdYYERq3Frf9x1lH/4POA55PyG0U8YIhrmw

ckxk3Nfa+O6vsLGzwr1wwklNtmMhoxv8wVEU0znJoO
VB1xt7BJ5AdVVYDZIeJMK+QaySMH+BR+HskToP+gQbquMXegg2V71eG2DjVPsxRU97l4mtR6ahg6FEo9

dC+EqHn+7PbbaO8oWOod4zCJF4utyi/7Kp3ZlL/DlXmbpTbqM5aS9gdE3JUyYGvj4j3Iq16Cv6BeeYo/

AvYOtgbRdsP+zLvH6M90K/v1LNmTOk4INoJkha3u2Y
U6YeZVcEtt1yE+DPigBxqtSHNbC3giqKMquUaJqL9NNoz6fIUZ47+zEYrCDZdE73/PamkNclviiOS6eX

2MfDzXEQL3gylfb5FX/YPDy+QG7y+dc3gXxCgL97TZWX11QnQz3+kd6Tky6Af7zm3h5QSU4XdDInF1BH

pGPackvC06ArNC2sh7IHMmMIllADa8teI8gA78Bcp5
MR4rAAivw0IKKltzvZCZwvtA2YDFW+tq4Lkb65/iF6FRBuPLtvaQa/kG4Ezn6Fs2fwCFGgSdAupsLGCa

X1gY01xozkaZknvhG486CDF6dOyBslHTNviOYp+qFfPzXJZaz0sPPBEygWvdiWP8A+OVtj5hPXNY02bh

Hj0yZ+fkxBi7qCy0cvBSiob56P9P6S+NmRFzksegjH
dPwbzhf0D7fiVssDV0LEVbEHZ2RdCQPbr6UoSDdvXTQauDnKnesZMXJEFMr8bYkx6IzT78MS5xpQeUDM

88fx24wuhv6v5JbHvY00jcXkc7q29kE36349fncPGjJ29ZGwGvKNcFdC2aed2rHSJrOj7p97ikYvQZkL

B7hLdeg1J1x1WDzKCcGpoWcgV4nMam17TDM8kmyid6
GwyGTBal7o7G1nLY9kCdAnwVIFcQb6jFpD4KelIFz834UEUfuUW3QWwBInE5FNWBQLjekRPF4GtYEZzU

sUWj2iF1h99AimuJfMpljpK6LF22MVJ3ElIeZRm3S9Wwk45ukTTy3S8ErwIXsLrKXzwwLNF3SSxY8pq1

/1C4kZ0Uvm3mCIAzMAfqcy6QCv61Pzm2TJxjoOkvA8
GBVMhp6DqVUTQkm4AhWoTnJOztfgvJY7/6Ne3uUcgj2jIxI8ZAI8T9viLh4G5XUt2c6Ot3XwS5hIT425

pkaN8i+vwg1pN6luDdfNveW/BT4q7NJkK+bLTbKsGuCAUlibmRCn/Z2oitFP1JAprj1vtx4dF2TOB7Xk

W6gYzVafC5QcsUF2CGpxodI+gtKf7Cp9FdAMzLFZu0
mjqHHY1vrIOMOJy1j2qT3l1XGhUmzhE4Mpa6n331UciRtYmotL6+Wi/dMECYm7Kn/1SOc3h8nKxyIHsi

YIH1VIMpV0ESoMHuu8Y0cKD4tpVcZH3XjvIE2daRYY2GNBWDzpBbuqXqA37Zp7dq01u0e7HuvgIXAuY4

aXNdLXC/y33PpLYRY8gTHTD3qv0cYK6AR3o1G4+JUb
1g1dtnnL2n93CIVpF3I5Ku45zju0Il736ShXtNMaOm6HmtkZ2wE3Fh71xZn/uPF3eVWVQKzTK+dMiI3z

1/U7/BOM4NtqhVEapAvMbvQ71YiRVR1WrhOC6cn3mS9MBe8zltR/RP3jaZwDUSCfwOo5Bc0/x4mUFG83

t6h8FdzkX+fwefwJz9dG16cFrE+gyliGDPfv8/4Bnn
/2Nnq9/SgJg02Lpoi/jcIq4CldgHsml4AufnE4fuBbJZEn9IXX+wk1a7Lk4JlPPHwb/8J+A6P22hxfiz

Ldk8KyjnqK1q9wS36Z/3/0ihlbZfDDiKgc/tCbGt36CSiSY23NIoN6kSwWbJNk09W9sj0OMFXQpTUx3a

u7cCASURRh0yomDQbpOFdCf2Bf0sKQhRANtzHv6T/0
uTamVwFxxov71J+uq45njLPwbniesUvqAURMBSm8uB1Wyk7HfDPI1sxT+zXUvQZIagPZ2P2kilWOd+QT

6Ks7KXdi5qTfMG+UYMkGxRmft5uGjvQXhG/O0ni5Iliwx0LPIgCkSovTvlsKWCQspR78QcXLGhE8r8jg

hx+osx0Dw0agdNtARS7juRycnARLbtE7PMBG31Tt2v
PtOjctE1v/I7gLYs0wwvj9a5oIzc+A1AVLJXK/D3UzTxoK9GEtb3ANsqJq0RkEsHXixrtiJjv57Do6x2

sjdkW7mGwEgXLy4SEfMZTgc7D0yC11qfm54EuZhuy9IY+pKlgSnGyMsmR3+HuoBcx2Cu/PyEtOKlrTcf

5YDSm/7Vf2p06h8PxWM9P7/8lbIMvdOdQzDliQERwX
aWBTj8ZqFbVxFiiIY0ZYptKBw1a4XwQrBAbOI6zmAoarlMXGRgrgX8citihtnsv9/wzAk50GiT+tsmuu

YwsJFaq/KTNZnLUtQeDOyvyWUiGfefqC4gPSmXfwToaHSBjojrNp8lwndsuo1SZvQryBKd1M9L2ApPjZ

xh8Znh0D/uyaXEK8S7CZkfz8py/C8w1SsO+Y/PHgWd
NhKnrJCmfm7mlXMJZjDo7l8EtXH5paHzik90x5EQqmnrbUaYgyLEh6GY8T1KnQWtl6Rkssu4dWcmKouq

A4mg0zykMW8/1wa1GSqd/45F+CkFMwh2W8I6qCFwDj62wHJRZlemayrMOHNW0XUg9zItl2rBwQi1o5YI

jEBZh1K2AS2r/rNULAV4qvRX9qbR8uFU0IcHjHlQGk
p3mdlH/V66I//wxk6wY1y4Rfllvo093w39pzt12wDUpMezBA/HPuqa+o4RFAggbqufVDG9Z+PRgRoan+

FL/E9nqD1++ImsGwhcuQjHCNCPn9WXMgAf3vGx5OUqnkouSoEf+fd0JXLZUJqBgng3BUFAT4weK0dp4i

ZcmiQT1ZLgreK0DmFeeS2zHrzHpq82yjPbeO9/dB4b
KKE56+PsxsdVT90cFp1rsaechgZ/+tjFX+X6qB4os7fbrMslQz4GBe5fFodSRtXojEDs90UYryQlo5Rh

i2TmeqD5hMtb4Udk9pwC0GShxv+gmi06gy6XkY7TtH5KTwexOvW64h4w0uwsr3RovvXX2iaGjbey64S0

qTeP9kx9Pit2Gd4Q357pMusy11+4aaDJ7y0UfM2WMt
TOkhibXSHtE3M5LL10DAD7JGLDLlPRNNuSrZY/GpIFX8h5yZDY4OPbGNaATmKA1srP4QcxbeiJAJbzka

YsuGp6U8WL1vd/Ks+qDB8ZoHKdsEk22FIj7WJiqZ/9JT9MGVyAxR9pOshWezQZPl5Ar98lVRyJLKx13g

wjBbhWuljDUiNQf4zyG7JjlCFAvpO9H4nnkdbmtq/1
falLzbZXn1fNJp37WsZxM9n5mIJ1PVCjk8Y6os7MzElV03KvPiEckMqyiLDGLlW/v/syoe3ozZHXlDWI

tXIyhYRWM30FZJRAcXGJsXPTbW/6RJ795LzGOy0UNkvW5gk77XIUoQWhebi/dmegf+X/U8HuQhpV56XF

+zOQf7gG6aqJwTyCCi3CQ34v3mj06Mqq33GVDXWn2B
V4/xLK/PUFS8SAvFzi9HEbx9rx1j2VO8pimlI7a3CBdllNcBvpyFRUCBTEYebzs4ZiBNlaB+2zfNRoGn

B28q9Xv3J3qPpjl0BjhXywj+4MkAn7m/Idfxq/ib+fs//G9fP6N/Q2WnD5X/2mSv5XSgOwR1Odkap5Zq

Ep1SUopK3Eft95WWnrPLw7x/aNNGX8UpH9aKrBMxhJ
p+p6mi0thusHzApjCVmjcS0+MNnc+ToQ2BsFYeUBkk1qu6Gxy+7QIE16Dno+3UKmPW7J+9h7NtCQd0bW

kYx9sTX+RmyweapJTmRXbLDVYBPB3iuARn2dVEtqzwMyOaJWfqAOrzhuGW1Cu3DTUcy1uWvV8p+go65H

RjUvoZiVk8h7gwsIF89X9L1YArTvLOHrwIWwZNwWPS
dDRnC0Edug+RYev1rIp9ihzemXkQvDjoTLuiHi+wXjYJl0WoYwaMBOj8Pk5fZp71RgzwM3dNoR0QgdIa

cWDBkgNQAq+y7AkcxMI+OWhr8q55oV1DH3tWIVUe9lBWWB5sjBWg4yZiwOnoPX3ZytUtKLHM2YK68bqR

v8KZGbBQIq0/65kMpJEuAfjmFJx61jrInqeq+zCe19
96AEmZ3V4Ar/wwD3Y7pAwSVIgaxju7T7i4Jt6sFxVcB368+eoDO2KRxPl6RW6Cs9ESurUeV7ufuXCxmr

OQ4zvha/vpk6zBkgrbXuGOl/tgEb5Jv9hS0E45/yR/xHdiVsFDFK4CnzENTbD/+whrYT6Zom+Pj4V8aP

347KIF+jgA9yT1YrDXbgpKvjB+lkbWuZDro1Yic6p2
rRaMI0uApueFRCSr1u/Bsng3zWP8uuM7n78eKCxsf6LSXo2GxNlZVxfGauBo9LjWqEVnAm8Rf5gfIQkK

JRDeZymY6pDn0gHR/7dD7aNlhBEM7voaS6zMYITD5oEi1FFoQZ9cbS+hbbmc/sELAOLTfPl1rORUkKSu

HfdBLd2mriMJn8ncfZ6lcGIfbtEA+Sp+pWVmgK5AwL
/5P42+GukrxHdFl2SlahHqcmMzM7+5GBx6IfiLo8xUECUc9VO//mzBGrKtsdNbVybs6mqhQ83JXb31xG

XAlNN+5rs11hVQG+DBhEdvPvUnplhQjxUUptEtdNQIo/9cIJxEtUmzenDgVb7L8GqJnfnoHDM396Zib1

38MdTFA+uUKFuA6w+q0C5r/dT/+vE0G+S0u8d0Qrmi
LE05IuNobSiy5GtVBGEheE0sDhGGn6j4V5XTcOhFrm3VIA7RBc0eAvVslcwO8Xe14IiRIPaGld+8wztR

v1OsQ5hCtXSoiowbHlrB8mVA3MvZTYVjpgljljZ/vDtYgApfmOLgDf8FHQBXtfX/i5JNilGjPQtKsOAS

H2Ny+Kd7T5srTQJYEDprf44OWj20T0+OejjfsEfWTh
UA0Cj5C5myVwGyKjrUj0fDyjD7rs36s+4SBJhKQ/mGF7sqe5cLL/oQunOPVBDRTFJ715qj6gqcWag6zh

9nLFLQC7QtAX0HF/ewuM2fXua8zGvVjvd5F3eDaS/2yMWX9Zuf2MFMU94TH9QXGtyrmAubiTmaI98Wv0

Pw7TrSsf3CRhkwZM5LF02dDZXvYGLNPSCSR5yLIFh3
EgXASOD4ZJ/U+iO4fkp4t5vuqeijkX05m/u8aAvWb/cebJZfGFzSFJe6igPtyUfD8F5pab7LvdwCjEnK

2Tu4NvJB0usMc+2PIYXQLrcJlbLedlAjCb3W3eQniL49IA74GH559mXk+CAv7LSodXINX2k9yBZgbRgo

yUgBnhxEjY50F8RJCc40zBMi5c8W5laQjJ/nTONO5P
Vx8IijvIIR+MU5HPyTd7TIJCnjEbRsPRWaBD8+zlJBVztMpKgWACrF+ZEm37p4PfwLum/ZJIIFUSb02g

JEyZRBQ28IIKojlvAA8Wrs7jrtx8zDdN9xpB+DvDzcBQBkfxD7e+JzyRWAb0auXwZPctW2VD2PJyM1AK

0op53CEv51c1lqYYjr2eyCZxltiaHSyyCuhRLE0gyZ
iDwVeLdklesH8QqQMV8s8QfP/BWua1VhPSPLVHT2I99ZyBCVlZtM17TVNnhDgd2d1YIU61MOE57h4hh+

Vf+Vf+Z9tyUZlBSkR7PBEI5cWUWKk90PoFaDcQ3lH8/kaYIG7vTwvwI3b2WTP9gMNXkbI4fU/5JueiQa

De4t7mR7F8CFYIbFIQ1zyjNNjeIEfyIyT+INajl5h3
6Mg4uZpIMSaRBjCeh03MB99cj1qLVPLPWzOTZg/kkiI1FBsoWKj//217Nxe4hJKpWltZhN3G2xle3UVv

IvlfQquKg8SwHfd8VvKFceIIFIqBtMTUDSqMYLEVELjWyYWRdCKft/+xK1gJKCusBW3xacNwUhzg1HqL

c+WHToi7ERFICdwcC7g+l3408iknSOQNDhCtKR9YW4
407YpGcuW+3ijnVvOaNnjlxQE0TrDhigafWTQwfflLDFwZlw/eaCSBs5NmjKotTrxylwOnUcJTlnk2C/

bTtlSRt01Iq8wo7gfMtfaVReFJTQjEpGsM7v5p+t68aEwwBOEXtVIs9ZrHWqtq5rQYLLLJW0pSnf7LXz

RJj6/jXaKGCqyMq8MI6bs8r/4fOXEmibLHJjuMRkdM
tSj/XsewO+N3AH6QCPJG849niRCJHXiyne2WJ5nF5ILU/iLZ37M2A7wsWVLU2iUceIEtqKQgOKIX/yXE

ccOpDOHX3Ci2IAlDNNtjdqELV6HJgagRI76GieDoWGmt+/KEaeoEgUnQNBBzfSq2ErGhHpiCNWoh7ruO

AiHH5yebId9cxlLwTlOyUTYw+W9zDMYqI7hNmLaYlc
jFwL8lxZNqfWZ+vXSrnfXbEVKLu0Da3WKiBRZ/Y9dVclhFf/+7P3F75KIKoyfpa4QPjFMIX6WfsZmBZv

24zXUkajzBoGIgFIMrjzKRVeLMK7LHV9aOuHUDyZC5DpX2DIN8NCdsFJc3vcOwRB7xoSkJ7++rjmi3jd

My8Z8zqN/VnQbbQx9QArzYe3rdSInQNypUk/pES0B8
XweeyLxfi8NRt/9gZZVEWhniUtWt6EpeVgiMdOHeuqMQiPRqe2jbNTlA6QScwUvAU+2t/dz4f2DukTgu

3GLOfAfIefh4mCLq8TICKaFKyFHDQMygIfYq0VSUgMotZ8nDkGSfHpjicSwS6Warek1lwbnDJCilt/N/

BNybVlOyxlDdy2FwQJXuw6fQBPMkl3IKNDrmOAku7g
08+1pAlmMQ30rFBrYJVKfZUCwOJPenpGMqGlIG6l4u+EHRNGmR0dJiRQHFF4btOR7ztJGzJ3J/FfBMHz

1yBf2wfpYt+lnsJxH8CZYGYlSeqHpe1Gr+kjy1/SwqVLuhZDNOdqeo/RkKuhrhx2+6S0GsJaRYEmnwCS

Os1WEdKGuRf9qNZJBSxl9L5SPKTQ7T3RVocsaG4Y9R
5Q0e35xLCf14U3mLA1f1PzibXGg3w64JWaIC6wzo1B3Rtzi8J6b6O+rbhlKDlb69wP+6hzBHD+Rcz0mi

bqN8j0WYtQtCj0bz/E9lGkCpdABW/MbI8vgSErRQtTrp9bWPSOCzOK2EZ0PKI68pzDCk+uER9qmY434k

O08LCZBNhZT1YAx+wLso5xEcDQg8Xf0n0Pqdq1kOWR
QYLKhQFrojrKRPPwCZAMw/4oSITLUsYHrbNa3HfxNXYjMr9/kHCYQuKbDYWXAbsrXLy2T1YBLury6UYC

pil2q/W7SMkIia58Rpamj3uHDRECyqy0hWREOkm3bWX2D92IyuobdlOtaVf1+HiiuUkD7SV5q1mRN/Eg

I60c5lYBnEjtqI1GrMDMnys+TljSKvXowakn4BMKpw
34rJ4gSG7HcUmkLLnykJ+CRCJ0DORjn2J7dikZZG87odWVit6/CjcPvIuT4U/gJEFhUki/XqaluHN6XK

4fQDS06VyKqXgWahU9CGFMRDeUMdPE1uoVVCmLxDr2efdVw0N+B1dbyF1QCkeSNwEtl9abik+CKkOfcY

gSXfniCHGM4evGjjHduDI+pwGY6QvcHbI2fHkCbFSX
/T9DEfmRFhsgAYt+pw9q3NI52X365PjA4YUl3NI91SyU9JLVE6DR/frl3kXTd2j7Dc8mD6UhoI4EORQH

DyOVjt+l6jlxDSq8M/l/er6HXVklCitnbxNulWnQFIpI7+nk/ht/0hIAE0up1o1v+qxuHm8KxowmSAAA

RneSr16i/mK2FYmy6WUcgY6CdlDeMrusntc6VvfcC1
xb+6O9n9+T2c3wzz2zbQ2q2jfZfc7cjwjrCGTZLsO/1ZR5HY5j703D+SsTVKSi5i+bVzJxgRwZs4CQJx

2muJ48+66jcCafY4BxgPlTxRC6C9jdCgk4GqHmmWCPAtXX/Y+DtSQzoK8XjlxE93eZ59yOgSwRoSaQWT

wQFu3S6HslDdoBHf26Kz/lTE3BIsx+AyNnRpqEXxJS
VblVFWzaH+gj7/VGtYRvjywWftn2Z6aPto8ci5MFqtIr/GRfczAHOACmRVboa+WwoP005z8X5i+mUULr

InuS7xLgKlhkfDxUpwjq1Q6GD1gyc8OnlaCoeLz4/5ymvStjnnbpx3VXlLDxLRGX/9Ny1tLt7uA+zAdF

13VH3XvIPIzi6x3/BZn4Zb4dmrg1FFcJREiz+EJKKu
OdCBu6nd04H8NyiFpRiAHxgR8QoneJPfzvOe/SPp5UI61ttDmWGsKUTgOwP2vZ7uMq8vCAxvtqKvDZCT

753NgSyQ2KTctVy1i4pxxk65XyPC+JOjCVWRVmlryxoCSOOypcHg/C+oOijy2oI3xdPCoF3osOha/auU

2AQJoVJejSoeQzMOgqQjD0kp/foFEDSe78ryOGZQIq
WzFcbhMhtheHCV97FxunJGA5rERw9hoAdOQdLBZFSZ+XOBRX6Cm1eqVdhcT6aEYAyblI+lrcP0buw7PA

TpeGCyraOPhhrFFixMT/wxJFQ0gh/Ki9xt0WhTx4P12M5kWODXIAABkOJBCyf8ZpaT13nJS0ggoHdju9

3VNRB7qbICN6tymELPphNMH4LBs/2IUNfXSLJfJiOm
cRCzX4hypx/Wi4mLfntc4xE/BAzr9Lr0I+lbtBEGKeGgu3ARrjrGgo55N2eXvuB9O7qT3d8Z/0O9WmT+

36jSKuTkDAPza2a8cFo4ao03r/+yxMRpRfCkUILum3zUxqgrXSNDNnD+anjcQLz1P95wG+8BvSfhzfeA

MN5z+6c9h82+A4q7WtvbZntvAF3hxFORUbZs9I7Xef
OYTzfmsqOuqEcbTB8KOSTJ0qwZngT6scKaHjw3y8eWn5QEHVr0UR2+BqcZMaotKRX9kErVvAzxqoxdMQ

mFonQ/XHcfvw9dWV+JoY8us9Xo6EDdTn17KVpNLQyTn1R08zmmJbClp3UG7q6Q1mFWXg5hltxcndfpnn

p/HmQ3hK3OD3A687af1Nulz2gKwOdFM0E9+/LxCt/v
IxfFuMRUTNwvLvgbVE4CRF975LQ+Gjt8eMxCA+WjMprNV8Z0gkH7y0v6Vs9drhea2S3evy1n50pv12xS

f6Z0UisoOrerTd9A1WyzBXcfTaysWGNjsD7M2t4EYPsU5ovflXiILCzsIEBAhopwz+XnIkBPTiDJd6rv

gFKqdm5PzL67F7Dkcvh9w17Xu9A+6GJGEROdpSjTKZ
m4gwmtME7JOUxWcpZzYta7ikDH4bVEMV7W8eySAlXDLS5m0B93q0Nj+kXE6WZJmqheTKjLpZi6688bxD

DILNWFFOe3jOVGtykpBdk7zy4l8WSUVMPxSOEoS/8YHZ/0dRDgldGQZm2qJvpSlH9B3e4B9e5pad3Ty8

lIdRZFfmdidTR+udGYM1WWjisN4crl9M0fzwpaw6OK
sQEI1AU3p4yZ0zkPMW2i4/JTHa84Ve1UBqOnTGbSRWY95zz6IOcDZY5Ztcc1tb9fF8Npsu5g+QnvmgnP

+cvKjABEmfzVt0NabE8+mXm1EbK7NK7jeZYVdol2GpKF4cQkAhfi3pw4dG/PWCCec/ozF+K2ZxgM5T0A

pWzSfZ5qMjlDyJ3k5ynxazC8HHIB6wpvs44EDioMtx
9xg6ojteo1bwHm2PtDxuI3Y+qKQF7IroxsnFRFissvU8r+nmUBTK2wwmAifpXGnNSrNgczPbNPeurdsx

ZH/NcuXf/K/4RX1gOuwMMqtBchLYs1s9W/bzdWgiWQAmS8Q8JRQT3jxLshhz8VvBV/F6OF/2/ywG8Pt2

NrvKbWQWs7/khzWz0MZDeLFozSrIy0yuPZdybZeeWk
YCuxnVZkbGBsR/LG9arN6rkROLVwMkNAU6cKJNyTbqWTnxTWxUvztH+hIvEk/xuGC9k8eeXRXxzgPzhG

HXpsOquCwTdP3Uo9bYJfwGR0as50m/pJHOs6D4xO5iVt+J7dXiM5CpPAKBPDzcs6oXJ7Firf/vPg/xgN

QTl+0o6SWYzstE++Gpdn/x4OHxv4S8GDazz2Gg2DVI
HUqK+un+womTnh0p6LSKIFZfKC8n/n1elbwkhYLo5KIPTZYZ14id/pub74X+xf7F/sX+xf7F/sX+z/TV

sGnit35uVCx5XnTQUCBlieOcFr8X4fj4yjhyzgY6hjXbcRrcBiaGmk1S4RUnP2gMdr6X85zWTzDIS2c9

/RowDHpYv+YIMPQyk1KzoiAmui/X/0jicujI4PhqnL
SG7XVHTxY7t+znfFv5Pjr9qfyueJvE8nBKJD74YBhCsOGy2AD80P2GuZZC14zB+rRKzqhMWjF7//9mHy

PRyQIr54kMBvNcG8Dz3WZNBpy+AbMrQ8WP0Ihz8NILAvlLrM9t2TojHUg4/TFajYTmuXmApXyslAya1s

yWArisChbk7cCqcRoqDLkcfytbNNC/5+bD9I0ihnTn
+UdQ1xb0jEqlg8BXuTSs45xiaFA3dfKhHV9Su4tWI1AduYywMleOxR5F8zQUKS3KrN8+5sRSBLh6+/MT

px4/dGEsjfSM7giuNlZ0eZer9/lXuX9pUmT/sIyaDLhch3wQ5vcHHtlTPZTGTdnnB36iXY/MWijqGJw/

rSRXHgksdl+/66IwOC4OlzSNTEV8urYI5iV6zluq2M
f+j8qKFsXTyWqlfDR/BJh1BSL2NyEA9Ho6Fc9xPf9f6NEgxAUTTyX6FxxWmcg65va/wWKflHWtfP8H8i

FURN5roQVd4ZE8xW5o3OngWGgDrSddInTWmWs+oHtFtVxDhKYa2DUXMoAyv7KDaCH8smK2ESC6ZtEr2a

vYHxc/3VL46r4eAdUiy0pIB6x3G6b6vdnDlc78jj+u
Ls+sUEbNZt7m65LdRqebVO+y5ugiOUc/sEuaAxRbPBZy6432as/fAfCzjMVpyVBWGahMLRiBOSnnVGfF

DYK3KoboUpwVnoKrOcx1K1+MuMDGPtMKU7bZH9kDlsBj61zznnxvnSvXI23irNJGvlELvZnI2iuZ/tTh

2pdL0zJISv16H2aPe9NLzQc5BUdCYxrIoy3lyDLrC7
md38+YuiHCHUPVXHbsilYHbamDD3PGGJ0CW8lxGhq4ogs5Y9+UP48wbh8lqSpNQ6StrgR789UtOm/KJ6

5A0umCQt5nftQqcVnczO9yTHDoJaQWHuppSDa4kULoDNQbQ+kidDGrxE+sRn5Sdnw1hQ/jRZ/Fd3re3G

SQwjtZtF6euA+nSBCBmp5xo45paQgkUvPBp/NHVL/K
D/DCLq3gTUQpg0x7w0975S2nIWxPS7+w3XnPiMUPp2v1jizBv/TJgNX1TQevqKAMCze6oGLWm+ZVrywF

P9SFIqNz7WHg+X3SO4IN5J1bnSk29sAjDYSSADksNx97MEfzkPS+1eTm2Sct8agSXcTJr7EsMR5LzDko

Uj9x500t2zaXFqrsZ7Q0JVXs0lq98QQui5Fi5qGRHD
HeFtN5focju53LCebHwLf/pJRjbu/oZghdb5UCFaLPjIu9B9jRCAPdbatBTj/0F2xH9dkn8EwrTRAaPe

kkiDp3wzNpyNEyaymBzXgVpsVyriFW3yYAxEAnI1Bu9drjg0iR85ETGbjZ5DT7/6eVXRbaT2yKEpU/it

LlDsp8BRgE2OPGDnXmghiT706FAyrB5Q9dnW9ywzam
/GmzBLOkdgVwG3+NscYavw0sr7ujM3qoMpZm0Q29ZzJO/a0EyIzUvnB1mRmoFuTjDe0T6HRH0qLahhIc

4gf//356nGdBKCjjnOVMBYKNv9RVCo7ZsKpI1UJzAVmfFniXozcjIe0br1+ckXfN78hk5Db3oj+9scxt

swYi5gPm0zNpbI5M246JLCp9QTaHT93lODjzBaEWJB
KBZTjVcLLWcrThisWzlCOtMKnOddZjITCD9qfBvbY0Jm/RHChGc/6gcUWyiACs6JKrn9PcAZzc+cj9VR

P/0SoUgpJ58wMoODkY5VtnPbk5HqqFxInTg1GCTC4HQqbaNn1/juwxcD1Y9W9ZmZFMHiLxbihNaf9th2

4K3dLwi+uWY04s+ToyvIpIuuuM2xSPniWLsZmtHe0j
6L7AuZ3obq+NE0723wcwYSkJN8zoroa3c/v2QRRPTSWqXMUOLO3LVChXlllkzLuXdbrh4wF81XRgZj8G

p+iBNPvi05LxFQpY29ha6EDv5tWtcILVLZ+mKJzaFHzcu3cnAcQwuti2PIx/524JwYLv05VE25o6qePK

1AuMQjtWhPbhgDXCwD94xy4QlLEQn4TCPmk2tdHU3d
jzYdPB22PvlFN3uzKtXbDvPRICXNKurYauSPO8KaEPwItw0c+bSg3fcgIh/s1c7hzRixfnHYT+pMCxtD

owhwI6wgRJnkrVQOwP38JAwhQXYkQAvUyn8CDkP6V6coztz6y1iR/1nzWCWgiBxbI3Qabwp45/rR+fmq

KiSv7Xg0q0xYr9auAUnzcf8izlTclQ6PKJCvvtUCWb
1N3Yje4zSuTFdCWRMDNlTwIHeICZlnv7BJzZWk1+WLVBXkZo0eFPHj8iV4IBvmSvJN2Uagwt/zzXbf5q

u0c2VS3oEDV3jr7yY+rc80vvdGuRp5VEYH7x11fvzEQILFQaB/Eg7mdkfF0K1H1Zez8E7iWaA65y0lTQ

f9aUf6ptaB1CXk1GBMXwClK2Pmg/qPlIPy28m6lp1H
JQitYie3hIlymvsmyb4G6Aoe1hRBk13OFHnyEofzUnYY3tAT5a08tG+z7x7tOHplxccLjE+DcP2N5oCo

mKMpctplvq2/oKe805qYzOgrjAa8B0hn0sF3S6WrQn2e/o+DtvG5K3g8yDCoCbDwGZ81Tz2PUJ2rO7rG

P/dnFJW73xUi+BWcJJfYl3dxH+3NMdnOvmRLQSNSkL
PnIWHYJGF/fEGVw2gw1kE0sWL4vTPHgM6wJtOt+wnX5paKMEsmqJsyi9yxzjx6ChX8bwoMfIwUtvx3oc

c5rOx5E60BdvIGaO4bco01M/C5xgjTsqNAshHKgkZWM+WEVPRf9ozIsf1HwgGOi6am8CXqFHqU8GwJ65

mfxMfTXcb/zB/62z0f+XhWwEcMdViqTL4EXp4ZGmwq
CUWeugLk7SWTW0i402miUxAgNB4I7IrKvrHJAjST56Sd9+2VOdmo4T7ETiOceVbTL6lj31tSA0+nXCoW

gRDWjBFT67C0tFwc2DpVdKPHD0GWx7tNiat7iDe0ZU9BXv2FGJv3rr0jwMfX19kr56mxhVM8vf7d0t0p

L1l2TTeRmkTohVtg5CNVTpD5FE5iB4YYavzxtUu6VM
0i/5+JaUIbFF/0DtUejlTM2fgnloTjX6GztG1vPjqt8Chn2h2M0HKMc+iIo+39W1pPtwm/Q+O8KCAvWO

3FczN770Cu9HNbRJ/JBQm/5UMonZ10YeD4qeUtZNoRTb7/2C4Jhr0R2wwYbOgbNylX1CtGxORgL9knP0

riGOf4JkgbGCuQBZ3DM/p0H77q24VrTCkULiTrO0Z4
+YuxCoftZ3UJGJ5t1AMgsoHT37jkVhh1TXxDy3ciDg2rTcJAgHATXkxpMhWa8NnfwZrRAvCrVGhXIxcN

47SPlODNZirDpCzP5nFVQ/HxdZR+qCWJptW3il+Oxg03ch89JtPyieaj6AdIhQNpr7qc/tNt4YgJtLba

qyc63TQCiUNn/NNFqS1vsp+6p3A0iyvlYMrW/JMa4x
QzlqzQP21jAMj4sGrn30tIjOkkP2Yz8Yf/7t/LBXfSybK3xm9UhaLmQQLYsdCIAdFW3jzXV4tKl40+EZ

c9EyVO7+1HPnnHsrhvxoJSVffnnquVg+Bq7Cb/YLbX/PlauxJr3ZQjUEcCVuwc3UBGCJLN0jgFryxXGz

uRXB0Vmx28sIKghjYg4y7RNR9s7duZWa6sZYkcxDi9
4fPFeo3qHcYvCbQ3WLkLpKu7z8wOyMV+cZ/A87RLPt5eldaIKEtuhRLVAYVxyEoeuKVgtbGoujQF13yb

8k6nGwQOkUjK9CkXUpLS/5usafHab61boZ+bPpusz40O+nPs0z/vAm2AJk2Tf4Hr52zcdgrjHjAaTANh

CqgPua7HPU04+dlyNAlvbUqphrPX4vpFKVTXM5Sf4I
11iCA1Me8EVkLAPW+IHQJuv+7H+Or5VzOYNrBO+vqHK8IVIriG4bT4+0k19XhAHBVhtSVdmTpmwqnWgS

1my9Z7crHhxmfUDARSA9AdH1oEgt2JvmpdkM+8pF5cQqIJo3PWs2nO8m7Eq0XIK70y2TWB3N1oreRwTv

tYOkdxEmVrKwGJ5TyWJ/2sj2YioOh9pK+RXUWT5qGh
Xp/NPJHeXUZiyOQghFSD+YIk/X/PfjkQI49a7G9C2YbXL3prxgw6hA3SOM7YAKCilS6GjsMorDvoPOVZ

9ZkFNJ2QHD5hMVEOgVG6AT4Zdt/JJ8wOu/rmHLpxyZTnPvY2OYD9Pjd84roqbtmwVpgWMsQ4xGHqcnrt

i1j7J1nx1/V/GZpFAW445GvG5T5/QzAPUAJB9uOJbN
nFPIN6ebX4Nqq1QaPCPc7lbVe3WCLlsoS7OGou2efzKvsw7rP9YUeG120rsqxThRj6hyEP4Hqh3DFshQ

VG+APTSio/SG6PJel/X6a0U6UZqIIlXELt5u3r7Q4yOJhsjixt2XklrkgtDbPQwplr/ab9ANOPeaQNF7

tzw89T5pRWCgBgAHpHszWhafOK1EjXCFeWTEJYXS8p
w5qPnJhBapqoIyXln3B3tUKwao8fN8BHBydCTN/vcoXGGCkoN7dUW0PiXRy/vj7BUn/7qVjhSlDllHz2

Ca/l5InJWVqDTzGapB51yt3Bh8zeq61Qf3cGs2TfsWj6H5Sgf28/8LwP8qqTIjHYtryOEbMrH/Gt2dCh

0c/Trt9Vz52imLz8sb59+3M/h0ozstY7Fx6lM27f69
KT2Tn0Orsq/NT8EMYj1XUWGO3ksyHvzGyr7BplF3ZNvngd6Nl18bm6ZGZVyxAX1M18cJq3nYu3ZBdknU

SE9bpuf2eHlJVZBZIRoOZ0l41Nhl4DLVZSRv//7oLjcJC4wQNCekjcmYwkjY7RvupdVOO16x0h5jc+lt

tT+hBm8lGGK+dLgnbYqIFkskAALgxMy7ZCxAOA3SjE
Wmuwnmco9zuvt1Jkomuey0QrvcLfw+1OkV0hKQIJhAIBFkCXL+BVDo4Eg0QKqQvm9T4fsLt18gcoi0On

HMPvMzuUecWlRi7ckzM2ua2Sy6YbR8V/EyXoraxKNcoObh6lGVTcxXvey2Btj9RiJwfbsn5EBm087D3S

/KVusMgsgo/vO2zF7WAGg9vSgindifF/2nZgsI6ChX
WrDIEl5APrg+R1NVBaiMBdoKTW7oOmsaIprjV5fgLWmTLweKBraHyc7IluImpmngU6Vnngg0AlDDxeGN

ORgTvWouYTbhfppYGp0hn7TTBnDtJAjnDwRSxQaiMqlMHRNml4232IQcXyiJNpnIcz8CF9d56/Xs/wM8

+Sswy7DP2l9EBHFSmQS/yz2IGm91m1Ku5+UC4u6bLe
61J+xPUfQn7j4zuEwY8Sj3weW67kTi41l+NQWB+cehgc8w8MEDdBICYryB0NsVBDHFa+jmehVtIbyBlx

PIQsJM964glqivMFmsBgbtuAGQDCo3J1A0rXm7ivmGfHRfcZbyus/cpTkH5zgC5QWTZyGPuSjshhHayy

rC48kCSMZprmLq5+5ytMGkEJ9WDOEC3Nln9xdBFW0m
eV3kEyF7xb1WhhvLGQEZed31G6fG3bQJZd7b4lNCp8DrF+IlMcAb3H0nceMitwId24NVWL6sl+euwl2M

f2UNP1sOwZVIJ2GKgDkRQ+BYy6T9zFax6y1KZGqenIn2TKGUW5b3ScAKPF8Zz+5CHNvufAtNXsbeuWNS

1RIbhnJ1jIphKb5ndL3it6JQdWUHoEAw7vQhJ6xb4I
rnuXXfKZ5rptnfSmR+gNct5qPAeAwlaSu0RIaLyTgkw/rhY7Ve8PDdcofqVbn1FSsqrMmpuotZgfKgHJ

s16y8ZbNbuL/jGvja0JEZeSSKA7XZdYLHO0pz+nlPIEnnbSRjAcGEIft6rJxNZtgeq5vOSmFdTozqyxY

mESiaFCT8qN8qINniXNrVfEIlWsv8mtJvAvW24zIay
w+mbc1pzMSKRfQdrIqPqyQCj+1GyfxfpmnU/Olqfcbbf9Bf5lw+ERVCE6O4Ifu1hC7Inqtgy8gaL5gH2

tX6w4uSWsKVj8hFybjzaX/nBnOgL/XYXzbDmiILOasemLQoltvx0r/ocPtRCiFTflX/e8azyFOUOsJQO

VZlq8aD/RRKRYY85w2f2uN3mtMb/BLtEhz4t7nPets
GfQugV7JCdKuOMQVaS5ChOISqZ2dKHvRtLqtPjs+NefFkBqN8q+uuHgM54nte/kL5O1hWJfBw7T403Q9

SqL5WHjF3vxWsaB5KoLE3vAVib8bGGG2d5/39KNF+479r1PNb1fNkeMs5luyVxBAREmy8HgnXJWcmlmf

3w0Vy5rH0xye6iVjs/LvGnCvckgYDLzoHsnQfIztNn
+3P1duchc32BRzaBwl5pst5s+vgTiriaFAuig/skI0pHK5Z72oLz6EyrwKIzOVR5TMDztbQNgsuvdLws

LtnoprZcZv0ySEWcc2lUSdhFDdowauFPfkqP2t+/dQIP4Pc0KVZfpzqXZwKFKEGxDkyqIryCnI3cgpla

6DTWFQtM5g8ZafxTNYi21Bq7MqOFDXH25nK1vhZeE1
Qa7Q/Yjk8pGUA5tB05duN32ua67uRsZ2RRSkgL/Xvhw7LrQtBQRGj31OeGkBtKmWkfTzmF5rCCinG8RG

J3p8nyps/AXnbHOmm/6ni9Xdi/tmN7kOLATqooa6p5V9yv6TY6E9E2U/Be5Omgkmi6ZdgYLpDh+belxB

JkERrt2KISyg7RJrzKQebXE5/vgljeJHhaVegGSimp
k/cvaJRq557IXt2190Fva/9+SCibKF0XduqbCgQj2QbuEGPfLWIKFWCVmO5a9AK+NV1LcjPt/+V79nwT

1XQpce2Fnod9DCwgvEDy0Zgs0V3z1rsnvU+4qGZxa0sb+41GhsZVPhL37XX6Hbj2V8mSIHkfYMOFLLDZ

INV9dn2dLOyw9/CgQ4ebXZdb5TFUQebFIeSYjTvbVz
ZV781gbcoAPzoQ/aRuxoaqpCQ6/ad0cdMaAOF/g95iNimHM3dpc53cxxQyXj3odJjq64klCJaLGSQluP

lGLnNM6BH8XchR1q+eAs+9RFjqG2HmTmXSM26AFPV9MN1JWTcS5YuIwdqDFjyA2JeaMqhmrbFX4+3hIi

t5isORno42SLQ86eadfphjAagz+uq/sXzIZR3iA3Z+
aSLd9GI8TOg7LSPpVqAoVxsrzvPkdL58JtX5LdFPbTwaNyKe2R7GPtazUt9V0RO++6qioDEpywYboBdZ

Ot+sdNrL8qR19PD0F0cYYR+xF908FWGWVrbNWjo81AABY+87WEH49AdnZbjuGiGPsw18NTC7/a+/SYM/

FvJd5pM3Vlp462XJWBjO5PT4JidwVeD1MI4xuLlEkA
Hm5sSUY/ZM5CesOUd1ZOQnoXNVc8PjZY+vAlxVp8mQsVQ79cpCSgC4s0d7YThZh5vtAlpKYZDJy5Kfeu

YipRWMxbKU6W91ApLG0QjjchvH6K3nhGSSbG8LUE9iLKLTzmQqPF05PCUVJ7P+wS49n/YdgKasAp8g82

5DBkWuxVj/Su0CvIG1Ea0/Uo2ErXMeDxyKYbnf966W
PlcXcoeh1PzhF4AtUVgFEHVunHnaz364JdmCQ1A/hDBhMPIb461SsyM2SHPahhSj5bOeOXKob98YOeEg

h+7UaKasseInVM4v/PPaxFjoMUmjnpH4l6AAq5oDCbhl/f/qCZnrLkq2xBJRtzFqGgyO6P/fVGEy52Sf

8YVJIBjy0lyZHvZBdD9r2j2fBzKYRkgnDk1cCEHSBI
iUVzU+8O6FHXXR0RII4AvMs3kD2t4jXuCFmbvt+AeeOa3j7dxm25obh9q8ukNJxXJO97W9woctbdg8Au

GtP9Ak937jpmtGNqiC7A71TcdnnFtPTFSp+8qT6NUMmihNVEDx2nrxTC+Yld/jbym+CpGhWtqeuxDOev

tPKRj5YA4DNxFYij3QIbgn9g/1UUbLHlvEo+N6yYLi
NMbQOJYIHRh9XzCLckQLs5AOAGzgPbmCuNDFJhoJ0zDLjIW71HhLa7bI0BcC1R6prcy3LFC/uXrBxXfh

zpdprnaREZuKRz00lY7IonWZRFVGTpNo3UCpuctMlq83wUI/C2iNzsDiS4K8MtRFkHSE0yK9jcynJ0dT

IdP/IjCkZ3CZkZxbtyby1kw1wRT1Dk8r+K8p77TyCz
ebq8/BQAYxEDWOBYFxNsgrG0PIon6YdijOwghImMk7FMNSWt8Z+1ikzEDrWxRgDGJpimK+oiYfDIwcoL

WV2oZj7tIYZ2oVgWS4SrMKlTqo9IE1eZu90b3URHwSXwELHW4hbytH+dMOckraGchErRYF6ayU2or9g3

S1pcTNDmhBTqGSKghU097nxiblFwdn+5XIxdmf4IjD
8OR5Ad6n14WHDJeTX6V7bvFF/XrbdGcAC2pwyx6B0cfqH66WzsZldMu6hbtZ6RAIXgsA213QyBA9UgBv

+WBJO/1p0kGbH8QREoh6vXBnOMeIic0Z/e5w8oiqo5eeb+aoLl7hO2psT8UxwJ/tAxDZUE0oUS0zmf9u

jcN6eHhyCLGccX+LO7+7/uEBbMPCpOAlabbS9HgAI+
I+w/vYKg2CFfzYb2FFsHVG4ysrgsReftJL6yY8esZoEfKqIssUmY60JS/NvrpNUFn9+K6fonAWOu387G

qc4NEI/FhNntdC/ijQWYd04qzye31LV5KdyW7bLFqq5WqzOfySS02eLm3q6hBuxBuFM089CwpO4vZEYQ

RaeOazcof7yqHKD7XiZtABGNAj+pvkdCC07RhkyM9z
m4QgM9oewR7gydBfwwvPKa6W4IIydRN4Oy0ofPFkNUtDx08e1bgtVds/wNDSA6a1TYKpkuC2O+U0vT+1

pB16aFfP2rC9915KE4oT5bTF++QWVTMvqtUkIdjEVdTLJ2oym0uczY3QQyuiUrTZqUOigGdk06bEcfyc

yhwJ3/iTjqnWw1vt4QMTXm5x22WHYOmabnkODfX3kF
+wM//YCmHyt6uNSmZC8zTcjzc/WV9Dgq2aN0Nl4e5MtdjbBrso72dAoiyO5XTxWNbmI/JiehJe9CFS9I

MILvveq4DRuh0vTcRPcf/ilGL+XfoMYO4rMft79Nd70jvt0kzkue33sISn86ngh3upl8PTLPwEDl/7d8

2Hwxjx9VjkqlFc23a8O0fDgKdrtzjjZFx8VQXSLi25
u2wo9bHipW5Vl9ameRN/AoKSXDSbl2jh7DyROWWGdOfsaYnSTp6wM3MgKvfPrVo9KnJU6zISsJD3gLsq

8ofwQxRjXY7nalU1yOcZoBHlMTjyUr8W8skwx9P1WQp2vgiuDn2rlyATEGz1pBF21nEqDabSyWLjGWRR

62fOXFNrjSnShsuYKMhq1sj5yQ81ay4MOC+xk4p+um
NnCjn+VZqlEpNejFzequtwl6OR8OYSfdZFpgCfygWa3ADx0KzI7DwTlLKyh+5Q6gQcAw0gp+uArYYVRt

KHoP2YJAPTDAnw+o8GXK2HLDTA8n1D8AtbKUeyt+obEoS7hq/aBais8iBtMM7atUmQgyc/NGEYfcJQEz

GYNNQOa8prfm0IXMn/WeuxhOe646ebvL5TpRid0FtF
pefMVbpRGakVQN1TMlZVB4mckAzc71/sNwKIbKz/vvd0yBql3UEKBERKbxTkidVWd4XWmDEML0sIv8/O

w5Aj9HIYX4g56NojPwfUMF9SHUiB/FYEOpW0gyFV9v2CD1JO0RabIdpaEs/nYTv8Lc/5yYiAbeMx4fE/

vagmaYkVgqP4OuqMZyeAenZGLmYcZ+77AK2kBD+lks
d1iJkqeJzK3j3Yw27qpJJWb+oiQ5lxSB23rEHPcnB0HtVg1PbEiRJuGwy8UEKYJCUXaporheECOjDIx5

iCGLTMmPLZ5w+z16tuLAwnckpPWia28fuo/kZcTK4hB8s7xq+FqZpJIScWOtb3JYahZ4K9PflVSycSfU

SzGyfqMce6W827zdsmzlqXPLMwBq2twp3nZjs7Sv67
3GGvEnhJs6YQStqd/kokQuBgtdVcnQvPS2gbfT0j53z8zh204I9NPuX/2zad002GtxeJJOxszL1BUHOj

EHaQEI7ey3PZ7C9fAE9MA4lNrwX1xZnn3DYX71cqEZEUUDg0r8TT3l4rcPJzSriDNdXS1jgcOAmyR4EN

9zv5S52aPuBhaUYazHa6Y5M3kfWYsUnO/rZaBpmOLe
CGQwFFMVSl0Q6TWWjcT57c+ANzaKS2rvkU+oO7G8vT/NCBPnGxxUihefioRT7duA1HRLttAXrjOFeNWc

3DIIEFtXaZME9oy6blNahneRAfr/ERSGUSB49zK2W3z+didIHq6AvZZj/wC2zl2hBu69U6S3dgdACwVo

K3saxrjmAmV1lcjTIugkdQBeIcWvFMoIllfGEQQ509
to05bA3hdtmL5we5lbbjDCk321x8xnhMx0WXHy49c574xI6dyCC2Jy+9p+uYBqalYF12sCe9DKKJa+hf

0wL/is6ZxkW7T7yxoK1jTP6Jmo9Qx27ZZT0MebUCNB1WcplLEZI9QDkxfvwrtE6HS8J4tQDwpnzp26f0

7IdXHfxXPOyC+RilmXzjjbBtvFrDcdco1CTUQtnBPp
jYSe61tAp9vhI79f/vMikE38ZUK4K0bUyFal8qp/QOhSc6Ejyity4P6fkLac/9xeoBT/lHi7Usplnl/G

7Ph0A04yuZAefl2M0Xn5wtEQUB9PDoF9BagmyBpEbu55SV6vJZmpmgBTnQY2PewtMyTKp+jVpns3BNDk

UL6Gt7DqDUEh6un9ju1aDrXM9oL/mEaYUo+yTF4HNz
uBtI99+pZrs9L48TKVJ4T9XVMYbU2VwazQWt5tfW4cOnOHlY+ABibcHRG4uzAEa0GIuLjWvdkHwWTUyi

qSybFFOsyL/llP1qYZOxiHQhWNbUTZS0gM9wIOymzGglGscy7ptgEO/tuMV7ehvbyVCbIAb/jDn4kVnj

ds0iRzxc26l5rUNjhYxah6z7RLoiAqMYxlAWIHTg4l
aKZcx78GLbrKDh2b/1gKGEv+ggOaO3UqfSfFhp3S6iwiE++qhcKbLVzi4qKyVYp6jWlw5KosMKM+eJw0

qHMjdeOQabpVPE+8rDwxJMwtBBa3GuI75mIa/c2RDeUQiDzakrMqHiW9kukMztRsmMBWob3VrOhQowCf

fQngB4t4OIT3frRmCMFRwN5u29FF9GZ65OrWfGepm8
9cCAEhgjyqhv5JpHgZAILL46m20hfSvks80jsmfJdCcO6l+ggfLFXMhXS0ZYWW2EwXJDt+lr7EBB+1yG

5yZ3cLvODwH62GxTsgWzf0LQyxOe8ouLtSBJHlzSN/LJI3p158Cfe4iIhR+ec94EK/hEEC9aP+MLQ/R9

hLmDwUyOcmKwUHp2LoEdGT6+zvNms82XOj4cD07iHr
zckxVMKbUNsyHGD+b7TUPjw5uIOEjMKza/CLCtnveGdgVM6xCFDtBVuhA14ug3stdUUYP3jwWRYyDtto

xZqBlwDBlgkSR2mw/07abs0/egflGXXNmqx/WC+TKZOpjN1w2LYPCgAo5bALJgKKvnMvrq7S+XzeQp6S

M7rJx52losFDq0ndCc5uIVF8WYvZorugY89oZzj+ac
EMr7R1Lgxtc99IYWbJuMz4X0A2yCG4VJ6t65hyZXwmM3W1lWmLPqf76IlB2ToR2+p9tiquH+XwQ4YFtA

HVTSlehLvkuQQEuzBT5QDT878unemTcn9buYrg3lzpSFyQ5FabmvXbrGG4rS8zn3bjEicraNipOen3Ed

fSABin9SZ1mgIjXPqBq/w+OcYe2bU5RAk/uaU433X4
sETdcTi++g4DsS0kVCip1S+NdVwCH15w2VOCPQL8Y8BGhQq/FGdC21xbr9QI5VJLf1UQ6GVFShFC2M62

970+ArIum2F44qAiCmjNlmj5+KcNN6J+Pwh0SmFyGCaO/EH2Wt/2Ys6kEPI8iFvkddWH3pUPbpFXbi03

sS8K24zivIgJ0ce0guqlSsKH3w9WUiPg6GiSAhWy4b
GQEtMT38nXaLBTf9Qa0AisaazNnMeE5dyOCT38ePFHrCopMd7YdL2kQRExH5CmLQm8U9zQwnHBEh3zZG

aGEbAE+mm8rdkaBGR+nNPvVNyvf0sUiiWq8gMXGczyvDfYySTQBFYyHkLmI9QneIWrTPOrHJfHyUNpkO

qjgY6Vx9H7kSJvfTiqSPNrErTJsdg949mZh0jrsiV3
V7tNkH8pErHsZACrjq+gwsrYhURisxBfkGnYDCBEtckN6mbXCAklaVOJAz33hDkgeC6+ivwsIwsa50b1

Wl5HyiqPcuJg5x8Svi4mp/8WkU8hwZd1nDB+dDwHn1PcpjGMfKtuZ5siusLeGXYJMUGk6OkhcXQvvlg8

GK1JspcRK9qLEQq7wgPlTGRFYgwNLT780hqPfZ2QXS
oazOAYU4zooXvgpnTGHbXAtQdZmxqNosHtyQAie19PXd13zdFWqwd8U8rrVl4Zk4y8WNLWNTe7jCDu1e

6CrjSGKeOGKFGSsAg0yTA3Qlf3sJh/PPGVc0ecBnEBNuRaC7xEpFhsfx0kxbFJ7sTJJ59A1ZFDWxMpFQ

EIFnJ0yptgSjzZ8j7aXKdwPT+RxXkowtqMA3KchzIn
TtrzsQCmuw7hVHjS/6/3ylNk/dgTrEfbDzMcjwEux0OtcdPFost5+EKj6YDdKWpxE79x+NFQ+2CWwCBv

89TH42fVzK9MgYxxtz3N6dSIplKajH8ObH6PRYEcZF4FaQsUL11+C9sAgQb24Z7hxHD05pcI1FjB6Vi9

/6YeQ8vRnTP8heFoZVZJqs8hyXDPXJMCtSIwZ2JXIK
y1WVkLIKP8kuA5ZIPLYWF9wObNaHjgzLu5xec1vr6j80i/73Puh/6cG52CEN5W/+rjtw1j4jh/l7yRbm

niHsWj4C7u28i/juZAywnDKQ7BxWgZDQp7j8itTRsM8tg2VANRlRu8e7sytECzI+73gbws3pwbb/Z7Dy

s/DGy0Z/DVa+mV33XqpmKvjJVwpP05QSyKcRMwL5Y8
MGKj1fRZS1gz2oLKGjs3UTeuF9UL1dagPiaXTl5oxw0TRpEUumS+tqU7t8j4/FjltGHgGVJKCllPj7X4

4y4v+rjO4yv51JSy7Bs34GBYiEoQSM6AnxFcePcboTSIDWNdG72Py/piTWf2i355/w2bUX73k0vIbih8

dMvmXSSYVoF4GQyPnjlPjkyzmbiwRgT+LrZjKLvL0A
UMq5VYjsYbX6qdq0vbNu1ZBhXYvbMf/c1ECwjMwXIewDmx/PmnhiMHNqS1gfgjpEInkw8+mTWSLUk4VP

eGBxYa+myxv7eenf3FYTMJr7Dpz28LVgQZPbSk1Lrt/H041brbTdXQTuhkqJ7uJQFm2DYqa4I7644TH0

ZlFXt1QM7X4hnfE+ayhQY0W9/28CL1vmBkRqM/PM8l
B4teWlrZRXhX2Qdo0q6sq207cVw/rGLtIS1NQchwVuu5lkZ3RWC6z5XOADJ9uTHM0wNvDUk/HaPBU9Qh

+LgqywBxzazIu4biGk7U5hpk5k75v/BT9vusr8je3M+eOjkPC1ZnVlxPpHyxNSwtnOiHDEoSTy9J8i95

iMg9yRM4Nnl11HEFK239IQJkUqCl5M5UBSBxIcFf6H
3/BySunKw7Rn1cHtn5L6loGKys32gD9AQM3j/bLMVIws0k/a/3c/DTv+8gYFT6O6az/2//4kEkxXg0Ud

/f3sXSS/+IKOuucKf62NeTOuBwdcQj9GNW/M/0AgzoM6Y3pFoUbDHDw5ca3Gda45CXTLTAvzEzQMmyv5

IwX41OyOcBDltCXQ6TnLKX1fqgaSlKe+g0I6xKSTEV
NDthj5/CxBZKj3paOrYeyCpS6t6HH4qFdsvYbRgEBhxTKqHO+94U1CNiigI4Jre80vQ+En/K8Gl8X/VM

jTww3KJ/PRZv9MGAG9pzPmwaFjCqaz7XHCZQJGx3fkmqtdUeoVd6sxiYs7duK3EdEmFsJJATTxad3One

FXqsnIicCmGpV9qMh6PJySVcNBpj0HDMB1msn5c5hf
BKzrliC5BANlXI7oMk8P7JnZQL41WtaMUuSR9KhbY8wxIo4mob83u7t8ShH4cw7nW3rXimhv7ZqgoeF5

20k4vy5sMKl2k9HyM0aNuNZv9H3Ow9XHz17b81MddzgLJdbM3GBtJd/9mNQRqQUq9B7ekZrs/Gv06MHW

34GCp4ywsOaZT28vmxAIJFiOIkHjrvYXbSKddUGkAK
4DYKmYJ4a7qngAUToABWAdNptBq2uztyrXq7FDw0pKjsu1QeNgrty6bbtiDkPLoKjJBxpNw3xzPXagw3

3mde2qubs6D02IjKWYjJn26x7SDJIZoS9LqeAoIbV6SANSqxtDAqhlqvSpH+fHwASIMWIXwEQdoMcXJ4

T/XZAQC/AExBDqNAfKeyc8Wyb9CJcVMVM9HcE679xR
AEimlBtGUr15FcNsTUir0bhV4JSI9lEd/hHLWjrObJtyl+9VGS8fTS5mPN3ID/uWmJ60IVptjXZpz2Pj

vFIsmGKBKk9v/fAPFSn1Rj40+zr4OV4KtnO+h12D+EHavOMeK8P/ccF6HgZiFhUebv3IQbJhn12QJreN

Cc1PgIa7CKbPZ+M8WSaU9w60BBFbcoMccxYvvo7b6Q
cWB2+BXvZhjuHqLz1rwtsNX+QxV+TK2vQvlq80J3Lu8ERvNoTDoF8QA3nabmtGTRqq4mgOtWv+dsWFmu

1Fl76B2xy1h08er2lxKKalHj/4Z33/MpMeVETSXolJwNHgBYlrF+2NUnd4YuauRToTCa78QPhyM4NiOl

7XD3coOo/1dR7paWgW9xDjHK+2lySnhoJxzMaMio75
xSbaudxRFBlQsmNwhyyHlKrJ6MY2wY/byCP1/lkzVs3WAX8aj9337outTHQDV/iRobOK6b3UiOht7WuW

WQmXKR5b7ansqxrhcatXrCIg3m4TvcA6SUdp+8sj+LL6jgpWIxWzCG8Zg2Ih8olMEs+/4wgJqE7QFT7e

aLsTPnJymkERLOLNHjb/e7CdEQcutjr6GMSoSmPptj
H+euS+HxLusUxLHC7KqHCdHouR/eSAIyBPsvlJjCMJ17zQ9oK0lw8qvWHF+5kx6G2KY710feC525RdpW

KlP0j3T7ZzpYaoty9FTEviOKyEv6a1PNj50exJApPlwH9WDFBVuaBG12KYM5sLgqi/mSF3Yw2taYdx/G

50R0vft32zDufsR24W72MGtnO3xwozBlHwbjxWlrpm
1XWHR/rabJqV+CAJ6Tk7AHVDpgXqAA2jZyxRkDces86n7MhsmM5rUWe2cNKYxonT41X/vqr4a8RMYKEK

m3q1ScOiq+zuTM/qXBn5veNrCVwpPz0392mY0rr6qtBKr5A19S814fYqgh/LJbGm58ZbtWp1pB1P+t+M

cctZCnVRhz+TaHKdCq+8xNxRGLE4IbrSy1Eal5SeQP
X8ntIhMp70tY+Auu0vkyiPH3xou8hkQpqn30GVkSnQ53K5ZiipEZD0Zk3Jy95oJatHRxx5Xh57lg4l3B

cLo6B/c/azbEZv1MyH4xqmC/+zT68ZyqOoeOea9SaVkSK3Er4hyQ/axWEMmnrGs3IqSlWk5yNtt9b0op

Xj4Avn+kdJcA9oUYLceq6sP+bLfyDq3+d0tBg2kkn/
uub/GR0n1tIKNwtYpN52T6tpqg9xDqH1lAXFd7kCOWPz2P4gaoaIKlA7aXGckAO0BfVA29SMpcb1RnHC

/l7zKX5KQrLZzOlCf1mCcCWxyeB4GTE8HH/1PAqjldKuXLxM4jcQxruVhEkEfX4r0iJgjYsWEQxmI6ID

9YxYJ9qaJeN21NnJf9SzHzxbCwuo3jxXMqbZCTjVCF
dx1leBtVyE7wdRcPtVtKChGfQ3QlNeBoG4TRvFsNP7FXbYqIuBBlnOjcrfaD8j5exEbAisvDq0e3TzBi

sQa0Qbxa4ZlVFWrpb1KVtHtb0V31xkGYIs78y+8elWFzvwiwo2BE1oStbA2FGIBHo9vv1KULihdUjaIr

/pHibUJAXeg+5yJ6NHMlZUHNEri/9J4YaIylg48Jw2
ECaLZwOK/TezG2psDK7fy3KlZc71HDmGvIOYdT3jf1oHtY6WksSSdOnvIiyRpXv4cgRkovD/vLydUttk

f0ow55Vs1LwDT18pv8tk9Rh6PzmhhdLhI0uT5hdjmW6SaN5UbUJQ3SSpOcBr4/VOliA2pqgxAQ5M2GqC

HV7Jt42fgLM3wjZybhsrF/rfagu70amUk3QwFXXmyP
wZsDc4a4xfW/gENhHXXCOBFdIYzxnojqAOdo7uopaaxbNC/vQnqD0dWQ+v4qAwlOYJx0FMARhha2VXJT

cXxHOrrMH3/pZsCg8NvLQHZKuJLUTlQoO8wTAbio/l4gHin2B14hzZefTOkp+OijaSjvcK1OJgUMqoA5

ynwbfv0fnwhc//vous7X5+KKeGlRCRH4CuPIXTX4aU
Y2XTSkm+0iOS/7HI3LI/bamj7M/Z4wEXv2j48BSyQ0YHrYV8ynFG3FfipL9XSlZXtRcJEDV+geKO8p5O

tuxFhXCNKDKi9jcmyYzcxgbpYBikcFKnHvH+ZKt9+xVr0iCMzIXrOrtlV0W8EbIMauB3YFXF3HEXy3hQ

sTCS9z5qvrv/E9aNzepvMfQxh6DdORg01D0eKxNyl5
wtrhRhm89mWtW9omh/OiCizSz1f9cSFKwC+7WW0Q2yE9QXf9lXYhkl4xcJ6AHxQ1sIBoSLPs4s+/AHgS

ck46s9kj/5TtHqHhWlbudFwHiuhP3zjAR/YLbP+nyCKGS6eS/+AZYIql8sNwx4CPnDBwGogkrYHjflsK

h3sKr2SB1fOz7R3CqwE+3OtogpCfM1VTOWRmCtaAMg
dcqTlNZx1J/IOaI7jtnIN7F8xY0dvzI6RZY32iWacTVHbLbqmGsA/tTTYYDYbpBq0wzgTuwzllGB6B7P

3bqEjb7DEQp1OvgFSsIUXLzgISVwZMxHcLbcrUFuIjGaA1a35zvVylOMgcGKAMKpeRrX/AOj4DK6nxFp

4lJ2Sx/h2ub98msdDg3pdYzrLqjKlIVFJGMuJajxQB
bJ0O6AEar/+PvMk6I8L6RGUXAdyxNS/h6nXeeKWJe6BlIjMWdUxj/UmkwXIi+OVe12svFPkUOmKQGg6e

/R9SQv4OX/okL/hAJbJycxsCm92nQSDpvb4LK7Sn4504LvzpCuWieo4Z3YfA+BDVaKQ1vvI4ny/qPkIb

FwOhp3U3V37rmhBq/8YfT1WqF+bZ5lyoTvb6CzgchB
/miYAlHVuG249dX5kBBR1sWRwn7CHdfQqGOjD5HZbuF1O7q3xZ9XBQa2RyvmtyhDaC8mlNxY7jWav5kd

3nDHIQrpBSAfV7Aduverpsd88fb3qoqxGnKBgqy82YRLT7QCu1tn4QspKm56FR36fO8mKb4KYd6aJ6DJ

gmPV9dutExzjYMapCYhak5aspebklx9RUzRAl6z8E+
bRYXKrMHtMsuTwChyI0nUmCI+yEvHpvYY3Bkf4SzQDgsKi7RbF7J0gF6iQ+gp1meFdPnmvlwBD8Qne8T

ebT1R/w8KZzH8xUFuUXDrBVq7VjEFxHqrIF5+zhvf4TXy6MJSc4P6Po15Py2TGOgVal0A4ctVk35qiUW

1wH1eV4xt6XI2o6ZN9oIz3Mjz6DYkuLb1pMhIvWlYL
wekEDshirgr5kCSdYb8vn0tl39w9s5SKX3aBP+a8AzUF6oAEOMJE4pTJDf/Kr+hkvvMyRiOKm1ao/7+O

YP6QeU8BanO6DUtDkRdR9mkPau7m0l1TI2tOJltymi9IaLDVtbFqoEmDKCRmW4SvOJj7wIEMALEFaL98

bwvczFF63xSHZ6XYKPTrI+ebRq7LNhjxTP3fkgKK+1
Caitlyn+cwq95QCTdB0iFXKcYy7uBAryjK5UU1NUnQjhgjgDKMUMPXukFNhc3/E4PAE2uAVu90z54IPiDUpA

Nh1xUKd4mmucuQHuswl798QHqii5kmz4eQkBgj5b2jEbsephW3bp61m5ZaXfOaB4lvoMmG4481Ap2ASo

ImmkSo6Gc7BCn+uLdNTL8h02h0hSMUpjskWw6+14Mr
J2RCU5j1S/onRfxTE7sRH8vTA2b5AfIecAXp534x0IvFdlOmwcCn7246vPoTQ5ibALokkH80//PuOGdK

57Qi9SbnuuvA2Hs7HOKBOe4qhpWFU7DqNfxx6UzoTa7bt0X4mkUg4XJyZ2GO6eOUclo9freI3s33oR6C

6MiPjykxEG2Q393gyFJvf2QP8s+Pj18YIqAfHFlLBr
QJyXu+QGZVqbNusunP7l9aXmqi7CdnZKiR43kQXuJzJjaJJVe8OmyJnUj3cTMyPja3SHwWcGg4j7/Njr

nEf0i48FlEJCioWkAbot0QXOFUg0guRAa7EfpfwbDv1ZH7wHbensvFN4/ZclVh7hJ4Bz0DzAnK71tUSj

fvMI+lv3zd1/4tvrBd64l74tvjlHahwGZ3DfHtHFkO
d9dqPSLaHlKi5ZAeAYE1+cpOArqflFu3efFJD7FusHhcXxCDxnMlLp22D0LPYFmJ1RsUbiNmwLkrc6a4

q9tIpcyvKHyla9KjffXKAO51dcbM9LuXRZ7WNVqosL3k0JTwI608DpTPd+Ws7E0LCU91t7f5V1+9DbXp

bBpSedBDnPuTBropznyX42cK77ZraZMNE+xtNQb+5u
tv9t3N+pwRzDj0jk2Ja6KM1/5PK5Hxo/nzfzl4NcNwR9qp7b3Rmlojo8Et2Y3URnV2is2BaU8ku/33bD

OYVLPz7r4kkLYbWgzrtVo8xKPMl+lwJ4UP4wINTPshqUbip9Mh9xN2b3rHnCNqLcap0/ATIw5PEZNSjK

zBWXNQpKmAPBNYkA0dAucn5uOFmzXA79ngnnTaa84z
Kd3kJFpDydk62vXa4UVfs6Kjj0CqIk7xEiIkBeRQBaCsVnQuGY9t83zd3qJsC5fD1k76oceGSDM++lId

I2eSCFBNU1J7A3HbHO48HFhwLYItHURjLeFjmwZrjYIviEX8Xa7pUIyqd/I24KNVLFfGN3QlsOxvEZxz

QYy9mooObiValCeAk7to0nfqOJ1iFKvcVsioMljnvg
9fx8hCNP2yl4QzI/f4QjwpW5vB+qp6S5e8WVQ+94gtDs++huLAvkNCqa3JWHlyQNF/oFDsqfUYUiuCFJ

jqLs+K7YrjZcRpFlqbanWh8CN4vHx8UiNJn2qnMDkoZJsHmvB4IrTGHWUSW37AP0JpxLYQEkSeiJ1ljK

nlPxYMxjFUxSzXYm1I4UWtMNSIIG8TLXOuaUO9XFO9
mImlHLdfY/+szphi5fVBuDv4mnwBFYMpSAYEBeyfsv7ICwk55/+C0oulS5Z8Iydyosf0tmWiSUXbSg2B

dNRpZsSfdW/BOmaPGqZNCL6rh2PBO4S5a6rdcjG0oFc4x08ziGcyKm0TbOO8PqornYZifjgY12lmYTAl

Z1llbUcBJmVb1I6jmdzxr96Kr1zujqoGqLpXcbKftu
+Uay/CYg4rWe4igPihcpWdC3ORM5qtYte3wHZpeWHn8+h950X42/UhY4UirctuQAJRQmE2/ZJLr0lL73

+v0ZpoR7eYwKVgDFu/AymOPYSQ04n3sHFbLrM44xa8gp7IXpJRkk4O73zdmKKdCU75M/5tctzZ6QU/x9

TfulyOdO9eeqs81OC8zvmpVR3W18AmJoWIe8Upox9P
nM4NkeCkt7/hh5VW69vFU5+hbejAs3CB16Xi/1sQLTufcvgiCB6iTJPRH7adfWluL8WWaZ+defitgyYS

pEABplPdWatOiB8r6xodA+0/skTu+eOJty53wkxlLlsfF+MutX3hBGivIlnE5g3jab8wAyL+fOiLxU3O

d2TM7pcbPBB66DIuXrDaAEa9sLmjvO8Hz0fpCDqCRG
W0zdM0FA63sihl20NVIHdkNaI/vvaqHXXLDbGojKduWc1VXIniJIzO/R+SIDUzObaEFigZgCLpwaxb5H

/Z+PhH3RgYyrBnmWRyC3ikvsnaj1QZP6UeE7EqYxUuhAueuaIX/Zbzf5PYJeL7IVSYYmSVZqA31Rzu1W

i2lZcx/WYZSQS1lRONa9BjUDtOdPb0ywamsBBw3vwV
B21otNOWu+5vfyp1SC1KNEfK21oIm9odiahtYMEHp3rlMif4kmUUnlgmwe/bG3swTeo2/ki9MbLO24bR

QrRslBmWM0fgKFdRhyzJZHG0aVwi+LNK2185wi/F5MzaqacyR8MlrrSqQqSr8TSTdJK5qaEu6pGW3wB2

ImGqtU0cnle3jHF19i6BfhChP7s+hBVUQA81CYBAji
AI5/2G6Ul/eAs0jsyavT4paGIJZz9fBzlN26YriSBop+SJegXMnK7ERhS5L1FfoI8xaFpsQuU3MT7asr

PLS21MeDpoWjTMuf5ENtGg+A8Ko9Q/xhYkXGkaSA4v00tXWufiIgHuPjhwyggs8mAgY2Sf8bA4Pp1Hvt

/oXnsmRYFp9W8gmUpR7vzFQcjesToQ9jEnnHc2ipn/
l3/iBhkn5pv4VLf4lpdzHVgXxOjw/26n8txKfKv05+5OMB84hQl4SB274zfMZ3u5QgPliIopxDhGAUcF

pksHL1bVVA9+BSkUOYEHgj6aAk4/yUFGXfguppUVKdG2RimtuT8cfOh12pVuH3aEPRrK937LndiK/7xy

KSg7IYQ1DPZiBv/MT39zoyxjT3dy+oxFX2cMn4IcUo
Uo9rQOYXPwgutcZ45Lrij+FRBjTnlGQn6Uzxdinxjz/XeYMXQWUDLnguYRubTxpcRci97Lwm18aQiCdx

Csc9gb19G86X/5d03yFUax2BVHxVCZQ1MOBkPGh+YVmllp3my3N2OnaOOu2YCPyFEY5fW/pdLswoG6wr

vvUYdVolfgYJ3R4kgyJTTE4phBtxNZSndgizaSq424
OCs/22mm4qBhwnnbe6LkOmF1suyv610Zj564MLZKSgbD6AkGXSk+EdMTO1gxpS74nDuGqW6qmtynxfIP

+Wrb+EfEgd1xUn/xWDPd+SJunyTpHPJk99j2qp2naIpsYHuxtcWzltTpECF5MFbRk7Pilxfhn9BFUyp0

rIAbaigQGA03AIyo3l/p0cnHcmp3iAslvnkW0aFm9h
Ath2fN2XWCySY6UdWak4SWXWnAormi79ZEXcOxPvG74pRmsUKfsLhJs9QgENl0xzLrz8xY1EZa17AiAV

//zvgUjVOtqKxqiT9nBXibhmmrzQ0UJQu9KTqvYWJMckYQGaPFuuf530fbb7YKQv1f9bc0K20SivaswU

b+rvL0EUm4lhA1MRRNGw0HjfQ1EiI4igvEYue0eLd9
CX0RPVuucJf0F9ujJsCrGZamX79/cKJLj/512DPRPQQk05Cqaq3EsoxhsidtAtx7MH0jbtPup3m0Qmfl

JKFR4pu701cwLZMs5fFssK/oJXN0UZyVB/WUZE0Lqh3LMEVA2ijQWnyGI/ehaGrp/QkoXGm2X7RwG+q1

48TVwVQxjNPNGqG2/f/x/HhYXrmI3Cqzun7Nki+iZ/
63rF6mtt7yh5xPSQuBmWlnsFLYiiSlmQbE8dr9RUSNB7J8rTULgJ48qo+rcHPOUac9ZrH2mKH3GWHokk

FbwT3CEMSQ9XqW3iLHPM/++lMl3/+5dRnvZBPdI0Lr4q2JOODkgMnl8vQ33+zBIeCqiD6GLK+ymBOqQ7

fknOcIlbeKOIvT7Ztl/UafC/vTmndJ/OmBTF0qSWJV
J7VWkq14/BwVA3xgFMWW8+n9jr+yXlO1G0FDxMmVVqvarMCSQOmicps8BRGX1g/HmxgH98qZGqwkCjfm

Mkai92a1i01JvHDXLjDN0jt/f7f5QzMY8HW2SnGzm5qDMBZBrc91rVjJbWt4SjysDP66ff0EikB2C+em

5tJVrQujXKEARaEhbC992ehxAWimjORwpuU8pjgIE5
/ppEIwIR7h5+qnLyZ5449aejYplp+pNEF/NjozSAU+4/eHkaXd20IoBXBBhXHyu4P3Adz+BbTmy12b0b

OI1ALIbzDPSMq4qU92ZC271QvI6kKrX3Apq4H1Rwun6dcfnptUdNgaXPJnycgO5Xrb/ZN4PGgY3D9ZaU

wl5XQutxh7WaPjEiw7EfinJBLVFs65UGVBIu/7tFb8
MlVEEOLCtb/tMNJeArjH2T5K4SvFaA3cNPguAWPTHKF6rXA9CpNUOs1Ct5zmsCLd/y/L/FDVRnJwnSwr

wPwpIk62DSdoRjmmxiTLXznONa5EQh8VqZttN+zZW4v78WaysYSUutnNPY9g+z/5JhXpl2GeB+7Av1h9

6sZhCoNRDMl6pqClSHlgSoyJ5uqTCS0+2g+vi4Kojv
diSyY1j4QouRonCQzOV5lTwKg371BXoXil2UQPsR9LK+KhbHtip2Ompbehgn5Nt1N5jDepAmPknxG9Fe

2PvxMMetOUbaUzaXiwKaVYZsY+SbMw033jC7BZUxX4LfR1BwdjnXie9HuGiF3VnnkDq4lT8ndu1x1eVk

Z3JNHUOsIIsm+Xx6m+nrLirwduqVzoQjArPKzKtwKI
M5EtUISOIlLj8uexUp/4Rep1OsJkirPnujoE9HFT1+2A19nejKWpoEM0FSfMJAh+ynki2fO2jdQL/ns5

6cYNuxQU+7LdcOzQilIPue1Qe22N+c9rclqYirXhXRjqQ99KfD3sQqD161xFXxCHMJOvb3g9RLdRxiKz

46iQY5gl3n+y2ISVlV1k02b/FN+nqGn47hMlxDbxKB
06s/kwliqhCOw0LQLsHivEU8WlsHN/0NoSXmmQp1Ro46kcMsqCCckxlVoIrni4lNmcozWdtdKuYrsS7B

k99at7pgB/yjCgIrYtYZjDrdUDbNJR80MsPZGDyjC3qLO9JXDaTLQqVoPxBnIdW2+YrXU34WvpwEOVjJ

QSUkrTZoOMe8WBrKEn1UlmsnAP4Y5M/SNwrzJMcTqa
FFcaX6y7ns3FpEnlbxeDTUjpfWBpQN8BASAsksc5mBYNvrX92WaGSJOcOH+8vGqtJFxn/houk8z/YSyo

NHXDUQ9ureRu9F3C7arZRPD70X6zw2bKWxc6tNb66FgLkwE3pLJ5MVdUw9vohzGI+hhFcPyV8JkUSvHR

ECmcw1LCXno5PSwhT2An1j6U4LcW3yH8D63uUSvnDh
FBgHpkzz7pj5vJE3W4NhufCB1Z7A0mlE5G/s44kgubFaSATdcx8UlVQc5uh2rbrMKQeKo6CUDtDY8Ewj

wlHP6E9Dxn+ys2wqJIHsC6kbOsGY8mKUANrRa2g99FCv2vsCAAxNQbJoA3qvG0ZT+j3j4VyLe0OJmgA+

NfO5ltCL04/iR273TJcaMp6Rb3HD3wkeoeyH5qxUzx
EcXnIys8mgHg/L3fvRHdvCnu9wzASr1OMwJnkV5s35gvR/L9MR61yK5Eva5d8dIZwtz5t8VMAn58+C0U

ieNm6P5MKwo5fnbdCoQNBATt8nJ1OSyzb2nMk9uUlzW9OianXEpyz4wnkYyS27x42dUJsY6RSg+jQ2LZ

5RGKCs4kbSq+4GHqx1xr5qYvYcGG7qeB76YbSB9Nqt
//0fa5wsNuHcNul23gyGW0JyDj47EoYiTse+eLTMh8U576r0vOhXcTJWVTmwgBZH1BtV2mWLH84aCxNY

A1ADXxu1gRrptx5k0p8O20Ba01xsd8Du3UTEd//T7d6uhppvMFWwM42/IGxtUzV8oE/J09Izd0hqj/UD

jVfqjS4jDkfKh4POlVYbyaHojWGt5AKr5eX5YMsD2E
+pRuj26x/+ogf7zbeP7V8MemYf8vH6tU1evYuyQj2XAXTq6fegNUSzb8h5rf1EUAMna5WYI2mZfZcxxK

71Dzn+3wAMDLHwNrRE8IZwFKLkAFVGN8aR4g3J4rUxXTJgxswl3+IzwpU4AvkSD/rzKKX1++dcGVRh8b

DzZSMOZQooTq7g/3jn4aVWx7SXiDdRcXj2DVi0dEki
1vrnsvLFPZJm7tgb+tehsd+5nObpczjN+vffC3mXpgIgBCWl6CCD2MeivL1lsjOsczign1gcaNo8sLtp

VXV/hCrkna8frW1rJzjnJK1Fkghs1LLp26J45f7UTI8cY4CTo/4pqIRg0BdiOWjmIxZJ5p4HNqmv9Bk2

TtpFwiknlbp6zhhWtrrUxOJE1Sek0/hxQrG0zv2zLn
GGEUcwYD1ggfKHgGB6SlF6YeBxRDBhqYd3PPzC+sdwf0gpT321doZNYoR4cN0U36JHFeC0JwUIlxz+UZ

GR9kBn0DdliyHkUDUwh8ZxF9SQ0gYAHI6tu2CIirBIhoii1NbkKNNYKdbLtsPfijevlVt4NM+x3LLEEV

pDVZ0J6Cnr1FBIYBkNmEMyzvXh7RHyfwhFr4eI4KIm
5y0uHr8ZqrpEdpK9a0Bp/Pf5j0FBDgASne8A1IFnf/oQifqiman5QVBwIB1pa27zOMQVEua3Di+BdRD7

r4rvtf5GLWokshbyo02WSBSMYOMviL3vqcXdMeAl0L6aUnesxlIVf75vsaTQV+CXFLOpBiQQ9dOEHAJN

Og+GH2tbsa81YaBGXpN0dmsv4FY8D84SVlzXv5eY02
dMyJIV8tGRcGVFXKX0iYcYgy7Y2yEr+jWSRcelPtsY0HDZloz4eoQKa7Lt6vIR/oST6SsTo+xHFvdlh7

3wBy6fCH1DwP9slUgN+Ao00YLCChDEHAILIy+iFMNIUESrAep5mKFGg4GTjL5SHh+bUyVlS/TKnkd3vg

C6Zr2e1bg45hL8hkB/sBYavUlkwY3VOPVfppw82RuU
EnV3Ezld5UwVXAHGOLlIUKX+IH3Q8z4T0KFCGzA5Noy3ulrdMvmK1BMH5ASiLoxUWeHyKgwl2M03QpH+

rS07xPNk87DaIyiJTqYtiwxkkZn9FKqZxKRxiMGT6dMF06FhZ/YG9Qrvydbud+EJJ9L9RqiygdiBrWUa

L7OHFb2plygOgLNu89ADqXjZSqHmOJTcrCEQklJ2Pa
Xb4B7we8TFWlO9XRpBOGKmOGOqvFjXB6X6cNttsH/dz0EMTyhhtRQfenMxKEvF3RVQAcktdTTUoDgOCf

mulZuUN8zYPfeNp1nPzneLMZ7hFtO7gZCV/YJdfxHxeT7dRiHwblrPrHlQQAbghYceSbLyvw74yV1oKH

S/FUyLQ5gla+iJXhPhjoVanyBbPiw6fN3yYXsLkk0G
WIHfXJJCJVmplHsTzXYCC8vHEiH+Zb781tCmjrZxcWD9lSoHeEhkac2vyt2pRmUEtDaMfh5ZimP2gKHX

hF8z3+6h6V2QnwzJhPPQuK5j8RUwgHhsg7lvW+zzCRFYTR5g5GlxxpbdMVNhoP2Ja37yEdqKywiMuWOk

6GQyKSveELBlFH9rNc0slk4cjdXFU2UqEL6PZm0TaN
aeW9zlKpyj9hVYt2k69eC+CrxbKhX6NhrR+cvzsp+VpvzoqBVdicPgOdssc35pKsbfg2iVXGEbYgAnz7

lc9FgZOO0/yxGCvM/ysjGQNvTIHxVl8uPcih/D1ZYbKg0IA34eboQ5whf5WQRoS+Clt0Zcfs77IFkNL6

eXPeAGub5AM5l6IwXyvORrf/L5sXy1o9Y31FCGUpgn
ZBQK0FXlzD6Do8ts3T/U6shFSY02OWfBNug4/XaliePnFnXipRxGCY+NmsJFXLZbIV473WvvEx5ZoVog

KukbRVzDTZDFmIotPRCn5OhNhCj6nVk0RUPAdTbADu+a/kwRC0asmsHFmkZInGpw9+18oMT90IpJ5vke

wPIBQDsYqkafXmlagm8ofIc49KmM7sH+AGnEWsxxfs
n3dkuLEmEOsUcIq2WsFWwNtgh7dz9r0sgMN2j9AgfMgF7xwy9o5fs+IXftBxs8Fc+XgcdeJoC4eHB2T7

OPyYHDamUFGRfI9skmBlOSUjF0PZINf1163zJusFh5U8n2hMlTZ8gcVqZtPVAkzt5avij38jhy69ao3d

cJUrBOtDyy4/PvhznJO2nnQrX8JsXEPPbFJhGlyKR/
8h96PFxyR8VqcJqlAfy70RtCL3UXXnugkbNkXzJZA1tZ5dUTDVYSM7YCGKIShEcVSAudw4POSsLa/tZ9

iYbpPTNUz8mC1ud/depFkn9tmu89lQ2DEZYejRT3K3MW2mOSleBJAkVhNKqowqYKlzupW/3HsOKIE7HU

B0Dz+b9m5zrxAYDiCWyUWxaykjWGZs8bDjeV0AQa7f
jWCOY+u3bEu5upIHUjUlDHC6HW7baMg2cT20b7V7wrVAkHyVhYaI8jY6hSa585uNGOrQ8YLpopR5Z7J0

bLq/nBCAnsSHYvHcKXeQ1RFQsnzgxC+L/+XEZd7pVtP7rMnV3569o0pWzthhetXUGmO7oWc47ImDhQNF

S6yf/bjqGNNW8xyu7xx3ljjMt1dAnjvDZyM0wcNlKr
n9NVTKTVQxfVtLPNZYKXPWaRl/7NeJ/ZU1+98DRRa61H5Iz9Cq0a9cQyYweapjPWyqWY7KIg5EL2kuZR

ufoojQwgIfw8APDhW09LESi3zTnkCIjIL1icFfAcMIpXTdkgknjMRlzu+bOy9cyh76BO9AzJ3+43L9J1

Melissa/CLGYx8oY+w7TG1tUzEcB8/eMBmQImxIoDHsEfW
FqM601Vpm/jK4RliWGns5D4w31bvZA9d6wGqHll7291CKuu+v9HMH5GPNEb6zTjwlboP2SnqG2/n4AwF

7hkNytg405HIa5tzfM6oNtJB0llMhs2CiiLIWgPxhQAHHKjtGrNsbrwsu5V9DPoMu3vIzWQcSeefcNYa

shGRztp2FR2UDJly6MT0ZduFLf9mZ78CmNyr5/jbca
cSMxVqgeC/28s79wPxVll+8FH/CKEfvi4k6T2EhoWYgsKE4QB/ye5NHOSzvC2BQ0P0oWaFccaa9DX8km

ZjF1k1EnD+Fab4+LBf4dxuenKNTv0vMVCK2s1S7u4th4N87+MJqki3fxp1Ml5tLuXo8SOKxMDSl4+pzu

8ZssPk5jVmPtoIyQnL2zedadn04hyocl/9qoK65mLg
bg/kMd6cMXEnZ3H/FAiIn50AkemfesxOlFw7Hj7MLqAEPVdX6douamDeefZ+LA1FI/tfcPVdp43aKgBa

4LVFdxsLg140KMvCuIm2MxbHkWv4aUf2fxDt9DN+25KqmUYxA9ezdfY5jrgWkz765g4uGFIQRvmcO//n

BP5GhsSSQ9UDVZ5jrCbl+FbxmeadnEri31PB/+k+YE
JqNx0upWvxUfx4frtMh0AqOTgKzLAKGRg0+2iiLXXgLSmR0XhRNYZsXp63yBOdkRR9NE/9eRyQ2NY2Gm

/kZFT/feUzQ25+eoRJzjSa2aMV8XaroKguUsdcDAGjl6fHkhUiA1CwRxu8GbYnRks7vzh3TRoec8B1Mf

o31nOlr5AUnwWWZnCVBVg0jRUCO4uK4D60f72CyNdu
uuwbDImz9sFDKaTfCSV5gVQMOlbV9plL866qdrxBQfa9w3+46oi/sOugZj/D0BUXoc7HzyRdwVAfhfW3

Zitk/9y/wUv3iwUBHuE2a+i6e+BC/0/BZKE6Fwskys8lQIRRfxRDhTxqyKr4BRxMFi1vfA8dw1I6u2Pp

T2GhinxmAaJONMfgpVoyiwROUYZvLjrHr0/lVOpHBl
rCkF702UY1Xxx9xefvinMIy/bktBPci6/MDg/zlNOnZMcoI4AWck6wGWeR5U6O0krqLJVeaavf8dwcsZ

zhzhhWKObi7a1WNqLUjNAuurExjLmzTKZihbHCYaOhEio9YNXeLJc2FM305MFSxVi0gnQb4wVr4JP/ij

QdchOPvAOxk8HwaPtGlW7XfuFoikih6k6vepICuWX9
x1mbc//uVIX2mG0FkuK/WMT99hHfu16GAK4wa21fDOoTx1anzmmt3rXi79NvxHftVfvg6VuO95cCU65t

KmRI+RxEk/f11CK2ot4yMQH/jvwfRAuBuqYM6mm/mjq/DTJLPmm82LY+Cfz9S/ys57ULNCT+FWnpfJth

2dvCxISVNrLsmEjpMhOnYNpoUtu2QgB1S3OiCbrd9B
x02ZOAY4JyPM8s75V4+JKqilDoGJ34bGCAIHdf1cIEjbiu9ts1rTVnAH7oz9mZ1zYCJN4F0OF+24r/Y+

Y9hXJxLfAc+5SRV6QMcsoxn7pje1uLYqFSUFt4imWKNltBR4lYLL4oOX3gl1sj8WbZmQtuaHu+yZLmSD

8pg4x9ca4iaeHfrUEE3N1qNF+J41JvomHsI9o7820W
6Ve6bgUNfHPlzEkedc66fKEeGIhnOoScam9zk4Fn+0lcFJ+lzjroY0xnoD30iqh/P9C6dFDpbKQkjPtw

2eqt/i5oN0576L/NamFneBdZKMJ6XBXglYY0kgyJPfIEp//5V0V//KhbAQKCzlY+XvbLCGVZBEjxaaKh

HYgrAx8h9GoBhQSx+ElAIrGpLdh3IySE3ak8n1YosA
aEAEuvnVgFMxnwC0cBgnJCMMYC6U7uDFYSAhaTq5w/KqOjaUd/uoAwgndEopbONUtqjzJbdvNwqph99d

GaHGY7/vsCperGOfWkD9rqgvHXacIyRcYyiDU9bkPrkcjbjeicuAm+JGsSJJgmoF7y+Fv5TLkGeTg99r

bS2DKJGuXVyAT+N8v3iiTaEP22J6lZ1MrzQMlToPca
sgrafz2NsZeM3XUp8kOhvjkJVm+fiXxW4PLUPXzaZamsg86UueqlnqpClZ88qiqlmRuSCfNg2q+I33qg

g58b5gr8HZFNhGgYOkIC9Xy9sQfH1DdP5cR6e4/A138gxJnjb8j1zU6mx38+aQIwaKG6/1MfKsAJ5DdT

E0aTwcLdW8lPodOmVM+DcGss2NNz0tY/+KRaOwtjp+
6FUqjlxUw8aVcym6ay3YgEsehkcCgdddiNCS8UMpKaR3StlY6zzOTk9pZLpggiRO5dlbrf/J2rZ/6h3g

XmblFbU+6yvozKpJY5q+PtZ+kCejkP3wAKzuUpoWvCKwrQQG5C0B+RH0+kblRMix0SAxpzEJVbZ8DOsk

TD8rscbpeT1CyehKqv3niH2TPSG+Xcgdl2sV0O1W/A
5ODI6D622WtDZU+bzxfuvzaLR2U5uJupVlKRZsXtl86T/+aTN1/laGPWlV2jEJMaSe5LeTWqwVt298JP

YAyiQ7uDgLnqWOu9wNdjZZ/fQzE833WiY9ncw3R2qcszzDM/+nNW9f1SRfhBC9DSnjGmAIGTUBknlj1T

Bplg03YEI9S/4ya8+shzvG+SYrpEsVeFXP05nKc+A9
AMx3u8/Wws9HNXcXfJUzVzS+vAbdJoZTEHt5zefSql7dmYduiQY6g9/umWQZZ2/dhwJFKYGKOWuqi0Mf

rHEoj2fNlQcozURyFOl4euIabzrzmzTLqW44jZFFhD+YloZ9z67dPej3AThRnC2AUg+3qDM54yCA9p43

kp9TxPam0yKuUnS8X3O4mIoF/j0kPaGzPYQ9v5ZTDo
FOPGBZC13Wg+UEMwdymNzENUGp0lD+aTnC8KPySvNl/GTd3vQxWs20XqENe+jbgVe9CeJbpSOzuJbfJG

RKFKWXqe3E87TaRW+vDwHucwMs4PeZX1Ocpi0Zfin0sQB6K3LQy3dMvFs+Del2EvGuPwHiE2lACXDMGp

fJUQ0tVjUHeEiIciEjgR/6zq1+gffVeIWYTMVnSrVc
xmenu9P5Ho7+pMfs7Q18zKpl+uSVeZFjwchzrCld1R25eGlYjGtoNFRh82ZgY69NpWXozwJqYtOFQYkq

qeAkg8VyeostmP6qxVBnayC711OXSCCQlYS759BZ2Kc0VPAk0lTIjBj00p95jTG0OcPBIw5y1nh0ZCSD

83ny+NYbHrOCMzp9/ifIzBCSQGcRcwfqBneTSqqS0j
1gyBbTOIGNOhscraw+DLTtVgodAAG/WQkEvjQ47PmNlCWQsNcsAlZqVtbcogkPswb09hZ2PrpBlwCwPi

FnubXNCr/f645TfXVJCbUJ2PHsGNSJDiaPcE841LY10ij0shlWxdd+nnTTfy+G1FvAatAHe7Vrz2KiJ1

8DdhrwDAxyAn2QSo8Zx8Bjpcxai0pURMHYULnktBUB
v9urr8sh0WKtIbKcOzIJYbMK/orkz7tRaOqDBGNcC2OC4U34uT1tQP/uq6OGw2qCh4buzSWh54/Xdw+A

TPETWlTXziNcmmbdRvVrc+L5BSjyJjbp8c5nsnXt92xe5YN4RlymYJL+fYr22D/XRD7kFe6rIx8QJQwX

6DVi61p0uQ/+ypOIOHkTrGhtJHEDtMhLknP+se+jo0
CsLh4ApPYy3tzT9c1Df/o+SXQTfWXGMM/l1upRFpMHlHjgwmvtqEatuTYcyHxVRbg4iFcEuicCTiaqWF

j4+yrdFp7JCZt7fkCG3CSAqDbBnjh1h7+No7TyF9TqBNAxyNzj+GuBNPVwexkGRrEpGrGnz7mISHjVpX

SFNh57smTBFRi4cCZCd1DWzRl8484ChJR3ayXPkcSC
iGtotSlodr0V6vdny/4+0VKSR3hyZTUUEsOAfz3i74L8S+IMT8N3Uyypm4rUhOYKwt3kBvW8r1iQJzaZ

XiyfdIFk7G9IxFo3fLnVFpkMMU5QIIbWeC9sCjH6Dj4MDvd31mzyiZtSgFm6XvWrX5lrYapFD1FzbAHY

lma2LwEWeptsL2k/ttaLIwoMR7fWgDU75X6mZ5jyzx
ccHp5SHMtyJVC9fwQ6GygtMpiI6AWK2XQNyVkWjps1Hed72h8/oXxMf/HFAsbKRYbTpnHQ4tVNMVO1rJ

cwNxPmfO2uf2OqAUPeVDH4e6E/BnWb/0rgOrD96GXBFi/Fp8v+w3XAAi5LEm5EI/o5+d564dU6rvdAvv

9UQoNOWokTsmPQko8COuynbenEvmaZvZHytHZX/1q3
6fU6eBfqmcZMMBKgZC1gVuMUzHhbTdc/Ip+yuCAI/6//VLQTZOIke56foIXF+eyisBEEl7eMpNBkEesY

//XOeun1E2M/ynLkBjsyjWOKaunpQbXT2IdSKSmcgVUVEiHBgZGoXo+BcPqcjsl8I/d/1S2xX3+UpcQq

E+CZW9dQYIGmz/4AkcQJkFwrSOQs0KUOWruRTUaKoq
9X4wEvZhzyS4lNY/euOY1+eHBM735+wc37TEzCDemqMlWenaNJiumCyGTLCDEFOzaGqqQvrRbbP33IpA

Vc1b+I9vKQWHn9nrtepDH7v3tcyzZPOPq/ybDio1FjqQ0RcCKK+ySDGIb7kb6WQNP/PYQgAAAtSOLxl4

JCeq1dCax8KZcE/ccP7f+hAwDDI+8moTOg3+mAu5ju
m/pfodR1KiCV6aYirTmrCv0ziRz0/tU+dV8wvcU/UEmISiqGWnGpGaEcrj1F/6vXkMCANF0Sx+5VsyEu

o4yuHs2OYzPR9tmrJUkuoroE6plVbo0Nz6c7h2vRsAm6uffJ/arXPso7jLyocnaYAwFXfszR5OGfshZ+

wdCYOYzmev32Ojehv24xlID3I/a0/O2qFhnpgx+EK4
P51etBe0XdHVPa/9En6c8+sEezhXLkjxHQrjHkDNZBvJ07JsJrDC0nXrfSNrYTCSjzCjxHQEkzp0//9e

XbBDffCZk72pJLXxYXqRpFr7L+R2ROYfitdWFQ1QiXPal1dJJs8x2WlrhKaiAUOlkmB3yWvgeK/0Nr8j

LFPETCm7cmeUfkf9ah7WuufO4eSmHftww8fxhPM1Pq
dkclLhcsi99HNd/MtL4O5CVaEpXlmLLAk8a8wHFz9ica0NsrEUrZvhN1vifO8bVZmJIrHpWn/ieR34eL

1qvgiTEb7ychH0FgvBf26qYDGQ/C9/oqFDiYSSZP9ppiHaWsHaCRabIARF/fFZ3CYTlEX5Z9f2uQb2TF

aEh1SV1U8AgCGXpcbymYDlR9bYt/rE7+TVu/wSt1nJ
8aXPGlwOrWeXQmgvOKTD1II1dZFszzE86opbRKJ3/G9UcXa+oQTfhyAQofFphKj4DgMw+ePmTmen4f8Y

+Wxv/d9vjCaX71IQNj2+wW2Q5u+NAhW28Va0xxkmFK9j3IO3z1nrMMqQLG5GL1podrSwxAH/Mf6B7P2J

jF/7RZocLn9qPNt8USJ2UcoW22q8iLi8cnmX3fsONM
pTTYr3gY5lbTrlsq/L3Yncqi7WljHEMzq6/f0iRQA0SuUYplzVlfKGyYemamd4/pl074RpsgodB0YXji

ZhA3ZC/57b3VAQ7c0HcMFaAGGUlxFmw6VIsevDGIqqStI9QEqessNA+PFT6lhqzQWKJHkmct1ryZlRcL

DdM4dghPjScr8u4cWjlXtltjxGiPEQvidnmRMrDczf
pvzZfz7hmCMXtVtgTpr2/sabzL5nBQ2OKUvgLRLh3XA3uofABfzIpQL3BirUmh9AnxNYA03zmQqS03gv

lHwfVeGEnSRYIIyK/qq1ioUmc5JfRoSjraGzxb7z18ZLnq1H1r8AqEzLfTURgf2N//rj5G1TaxOpCN1U

U4vXdUeTsdVKI8JwSL8RdwV+rYmBuvVCvp0hs8+QvH
wb/fCf3KJHmXOq+oNpFAq7Jg61OHazsqYH1/6ZEX+JaoqXFSYVFzZ2jFlAdSRSDXKCb2R5ZgpH8NCxSN

uuNtwQM2M3kMenOwAmqwT1i1bQsHdzCTr27Cgj+xJk3s6zogKurKn8GHnpdUWJ3XDoHI9G+c8DLeQjPi

oI5R4/2EDvvQBqPyvyj//9yBgiiCmqLpbMQGL5aoys
LW7l1v04Tmfe1RVR/5egoZa0iwTCub9zZt9R3ph7b8QtS+YxY2thGvdzLfN2UszSeGUNmQwczJJWvE+Q

u46Laz63xDDW+B7hXjITOP+l9k23FdA9K6A4S3rwXDKc9g0ZHkC5u57QDzEgaIIVVixg14bZFCjC//rx

UloZXLSuwgxUjXUOOkX/8b0ojMxEXuP8uetFA6RMGD
YP+unbmQC8SMqqQtzXdFr3ZNnPDbRoZcoC/SjGV461186+OwQFnc6fx8pTLcRB4awCShKdqgfoQwhlxh

dt72RJqwU/u5wDHAXFqIEnuffCT09kvwKT/ES4xPmS7KvPFJfyWPoATYiUpY67/dnmw1LHzg0xIBZAF1

E01fF41RlWL9U9xLc31Y3LJYOpwqf74UHr52q2i/UJ
aaUbv5YyiAxLnF2v2+i1Saymqu3M920NBiVnMR32Y/F6xB4Zjl5QOHX/211NsTFtSggPHuMcl3Ag/jA4

duSqtjn87iI+fMV/WE0mVlbSOOBwcPx3VjykOja7ArZRgVGQ70Zf8hCVQOBtQH8MTy5jFelcizc8+1i3

2RQ9GeRZ7mI1c0ERLyLC6x7zWPVSmgqcr979577jqa
KrXjUvz+K1dcf1qYkY4HR9MYvag/0pNOYgu3MWJsO/hyf9ksgduZPVWeIDAMeiviRgqsJWhv3rmCDy6p

13MsM9Y5tlRCvAd5iHVPtgh1P5yd9DoZX8RAWP/USoMyeHsI106veyrQ2M7gY4qJ0c44j8QP6MmNc377

YJSp9MTokItcfgUCzivf1RW9pCByZisDVlASNCZIoS
/GGn4wCvQlpMNdj5g7ZtaT85j4OK903Zf35/kfACnuawR+Tb4yu6qZzTrXYxDFgzUqqOS5qPgc/mBrNn

Rod0YtXXBqGlxtxPbwNLjiJxJ4t/5LqXfp7Mg0zfKkCj5QSl1+DkW686EcIVob9Oh9ebiQLCeBxUCQw6

SMze4xvlUF8P1TIm8pb0t7B4V6Rh4BJ0/X9tJiZYu+
EeRuJiNw8gXO6uv7bp5vJbDL3TpYRprQOms1o52+d3jl8uXKZIm1NtNyFklJf6K5bY2IrMdZa4Vs1IJy

nnTAAZ5Lfcvrk84zL3/Pnj9jn3DS2Y3J8gv4Zqno6Z0kAE8TSVMWggcUZNgby8uWxCDUNUpmlCuw69mD

csIhYy9b1zN7GSG73WkUs7MIK0XfzNxUr3lamrI4w5
j8Bi5c5oqp/NT/msd7HTewbPff9HM64SPZ8IhWWEXiPgADxb6oDKd4o3YeyMID8QVu7lxnKhtZE7bsBA

U2lrRSPeke9wsmGY+wqv700WVsYQ9/zQ1xaVDk92vZY+hiQ5Io39kbeXNM9fAxacXMDzWJcNZlaz4Gmz

QIPfmcoZ4+/T3V7RtjXoTqr46JduKJnBUpNYUYmpvu
JvGWew6fmgjZtHI0Vt15f6F10IHG2kxnmdIYTH8XGpVrww2EMubGJdnjHr3t/ACo/7aYxktC595F34Ns

n2Am9XdXVFsFZlys3i4m87dNcnR+kvRkRPuOZRlVFu7v3EtiU5ieggKb50dTPnSRzmS19s6u5wtCvvps

IPjJ9/NOjWX6+frv240WHxN3mIJ3p8xmCL5F2yQbkJ
RhPTlZII0QOIC7DRbWZUAWYI5Ko/w3SbJLQoD6u9NjWk09CNa1s2ePNIAlO+wvhEnCzCGKZd8X9aDwgD

OKhjDuadHvrrLP9wynkXkbU6Hy8jC+LhBOFLkK1Mt7zVzON0/Ju2AAwfgs9QNK0je23tUujWoWHOr7Pm

jP0J8YJmjeAUoAb28hlEuIU2UVqtdCkk5/kCgGeHx1
if7r6Qh2ODASNFUIJbvbdz6+Mqc8yMWqi3ztmYFb5YSBNdiCUKqwvGeynal3pYZn+6bsUSSLW6OF6U18

PIsFYp6XMTq+BLJE4DkD4ix2EzeJKxx24iZsGte39HBi48Mj5P45aEeaQA0B4oeMh4yIp8sHBpGC4xLH

tfpTYrn5Vrgxvl4fBhuzGsitQBw6Y9a+HMUYkQSIgH
/CooCbuP5TwcarcVVYtE7cv7pN6JlW3AbsPZLs8X/gVM2iOKa3Ew+k12Lzn/aDd4Ul1Wxxy9oulImUmQ

g4xSo8WVhs+F9RjZFV3V8q8+Psb2k6OBxs+V0R20MUMYc+inkRV7fDvl16gDnaC70cYa9zSUZFX0CRaV

WlB+KElGnDkSXC1J/GUDAHa02tSkRUj1loeft5Ce37
Txi/BWM/B/QuP7ai983wRMg2etGLlKJfM3yikwupflPNRcdKQxKI0XvZYLCsFkdtLB9kuYffBrwN4ytC

ZwB5MYI75Foz04pH8tmye8mCy3mMXwgjm3wpbAdSG/Cp3WZ2JiWieKMt3Aah6gMPkE+b1190gpXuv0WQ

RXOi2ZKMhlUzIAN7JkBOdoHSw777xz1Co7hfvUl3FE
kgDdSOtv0JpPZLEMiz3/0V2j9xkn8z4geCTcGjvVDaTJpomlUHZ3WWHUpKz+PT8BDftvkDBUfkBHnCo5

ttJVCcJIiEZQZNiz821yNopmtqTEJ0N+ubi+ibCXradB13/slZZ+4RInGNzswl9N0p+iy3dNHIxkFcLJ

XKJH17BcmZb82DlnKYCP0LrBzm+4zNz1jjqlAIJ+s7
i0yTFrTZN+gzeVQ/b9NXg51dcudZYGsdXhFayVWnGyhdOO2gQEwI/eyis92ZD6V25BKLTAlFVUZBlP5V

zJR+KwYmRIUNhrE0dKtbdVUJ0nOKYkMtvucV84bARBqnIQJNPddrnildQ3DNObUci/KE+stJyfmWYvTc

g2UTqdIjMr5DDI9cg+qIdcGJVa78AggcbK2r/AxCZa
erHONTSvcTnK/+QzC6IEjoFa3yVcJU5Dx0/R+EeowtGUpcwME3oM/639l1yDED5PufIikdBhN9ZPZt4k

0qh0ioMSc4h/PVP+KVQwHazCeUUyuoyGYvKV0AqWKZIjvNPkF3bsI+sFfA9OzCrosaQG1kRanxzoYZSY

YxoCN+niYBCdc+b5eT8w0VHs7sbFsgJB/snYeJ2/Ma
oagcNE6C1HD8+PsNjnf5pEkhrwoH8Dg6hXo1zbdjJx7o04eC3v7XZaf/9k2yDfReX2zZek8OuJ05edT1

lD+TdnqbbLuuWH/m/QtYipp7qvh1gsCyGuG6XuTjCHXqjbqkxuDABviVBDHzoUG7oQ7JwmYLPJ1eibis

EHRXH0uxgUwa3rR31ZiK20DTgfcE9qr+Y0+zy7e114
FLeyzMQ3C8MS+DDnYYWd59b03hBr/hB5oh17oYj7p4MAjF2vqL0nL9H/OpGfYlaCyqtnXFym97djCyFR

+WQkOioqIPCc6vROeX92HxMwKPa6B8tl44p6Vt0K3xqUVdVMmsZjIODnhvqM9i45It4vujHywL9s1Nve

gbtk06CJ29JPYbnaZoNoQQgJDLCEsGD7m+glePlpOH
IOP9PE2VArlWjvc88Ta9B4FeVrPQXk93W50HRoGaEDxnE1Eb0xjyQRN0Z8dPYsmiSl6aR1DrOgblsZVV

P4pDgk8im0uX/VJ1Z2r4qASEJQqbYkxtszdp89P6dbfnEqVVxVHTeSX9fz2zomLPYr6PMvn/xTFk8L8U

SFbJYXYflcmoZru6WoD4SL7FwCG1VuM68YyhH7E2eO
48jcQgvsrhR78lIzk4F2Cab8VfbMVB7+cgOTjQlUtxLCr82FNtGbYUYHQ/73MjxFh2NY0RPeNODv1qs4

GA0+Nl+3PvGV6X8rybKVBkhaUjJ/8M+oZLWN3K+rpMX35Ru9+KjS0eiZcrnLxMO1bb/77Nz8zXDdsi2h

Q1yLLPclyoL2yxQQFrkqHh98dxVk5NQH4obxfrSbQW
O7PICEzH6D6fljzY1yV08k9MzcyVw5skxBc0nj/0NYT5EWgMpBbzPeNZFenSunkZYNmkAOtv2dVxUnVG

7v0G0JpjUTL4f1OtfxYitJKoqJs9C4RByGqF+crQZkfawRGZOg2y3gyVBWKBJiYyZiVtUpPNHc5PyGKd

9DSJORIbOVDjZJvH3IjmJxMnKexP3BshoOmA7cFCuP
dB9eLAdUJg3ax61Fxz5znsfMZvXSGVJuaUEV6nh85VKyHK0PI5iAe72rOdv2fS71AyVj6Xdz037eqxyz

eGZbTQbwyCG3W4X6h9MWedrzSFQwEdKCzjJzGJTuij4Su+kqgSikux4AnjD3eJbjv3cX9LXt8XY68N4z

18lm85OhdiD3A98UgxFN4Di9LDrK0ormG8YduZqdJf
jFTeSiJGcxqSvJaGsIaeeIiovWZgw9pxukR64r4wKMXc1aUY9DAP3XUtH6CvY4kd7mV+CkMUYO6LMZ1B

K0KB2e+4oaCQtlkNMmxiWXOVxnhexA0wZMkVGRBlywj+Yj0OIpGy3eEe4H11WQyTspBa8TOjyVRPz64f

Z7KU6MLri9d3yn5YOdBzSwl1NJrHd7TW9Lmcd4iECs
hwqqVAT8E+/oeYt/ifOrdKc5fxMlzVPFzNZw5tyySpWPnrhfbeagXWe7mELG924lqUzM1KNcXMvG8jSL

u4xv4ZrllWun8SaGlfmeqgQE1NX8h6pn9wzsd0T+5UpXoA1uwIDy7aJNTBo6GprAZOyIvoMMU9a24zUq

zcyAP/fUyvYoiYjLMuBPiwiIuEPPcPG4Pc9qqhz1u+
NZOwE7JMEnA2YITiH61iTancWLDojNjEyYLYraXSoNuiNciUIkvSBnRkRdlpons6y+lKRzwmPQ9txGfZ

s7OKPmuA9W1qZCkWF9GjiXV6hSLUwqKw+r2FvO05Bat+4//TPVPDTQHUN1bpatXcmwFyNBK1nHDVBtmP

8RAUyyeoPn7tY8E4gNZVUiGVQH70b0Owxl3addoVtE
ADueaPcM0WVyIQe5qJGUNzGS4QhxIuBpueUh2nMsJLkGCfduZxAP5vUmG8MzgdWJ6GaPuUgAgfK5/L3a

Dp/JPole4FtpXxSs5VAsAITPuplK8wnLxs8w7PFvCm1s0GRZDAyMn3Els93+mg51zbPlR6LsQcFHf//q

ltLNQsz2PS7YLG71P+nPEz629EmCnM4HP1cf/IRyEa
+2f6gMnXwaXeiB5C2VDnlJrBEO6hEv9XftGINaRtFISyRdn7IH9WsAImnyXNUvkZzdedI9mt0ODPEh7Z

fFZluYAKiI4tHzogpifWInWid6XLFwlXcyWAZctcdyXAaCG/sRf/KhHkJ+0ayXOCt33Gu3gwxOzn86an

9gPWmzgVIH2+vt0YcUsgyUL5mUf+Soaf/Wof46AMru
iYoh7c293oZM0RqjjoatgDrGLRw5qyjtRUuNG3H7QJbVOEfsH0OgpoL6ap8tLfoCOqhApZA14WaaQj3Z

rAnrgPllYUfHqAtXdSz7Wje0ZEw8a6brK9aarvq3fpYr48Cujg7dARhUlqyQ10eWpyfO9jXZhSL8saNn

LCutrsqrEt+wD1xd7Uvog7itJOrZnPbA+chq0X9bsp
kjKjQH/Zkl1DF8OXx7mIcl7fwH86KX58yQz8pe8FPKFT2Lmz1Jt+flVnd/ZdmdAko47AtBdnQh+i23R2

x6g3UIXHQ9S646BpX6JP/dQ+LWXmmwIFRsoAzfvsWLwM5ZRXHvGxhe4o2snkliV0ucc8g5I+6zGUp/1b

nmNx2gnRs8jVQ4rKS4tvrVDcWPw0ZbKFeZ9AJ8hNoN
HLmmIpLGwRW89zFBhIHxuHpuFk6wRmdxznCcUmd3kDVbAGq6PxwvsV88b35hgGZ94B3lLItDEqcIsKO3

X56yzJ4IYuYUEjhts20RzdM3SmMQHw3OQ5WM4Vp/w8t4OMXxzKY9vEq7lDtVDetHXaGGg2txjJyIH2Ua

R5lBAGXqXL+MDopIsVwwKhuKB/3lx6Nja1fRJ2CqNq
Ygkpq0JFob9KLLyioonny/lQ/xoS0rA/8/CgVmpsIX9aZb3hzP9dWgHwTczXp/7P1rL/r7i9ocjJpCft

8keQqozLLT8ZD4dINboymtMYva/ptG7t5e0v74oPzfNrcs4TBepV0Zr9nh27TspE87qVhmW9QYCjjt90

k3qxAXJtVis/9499gAtnwlOYQCKS5RB9o5hFmPEX0c
ovlPabNGPY3T7/sjdoMiaIE0spiLfiCUCSTvNzkyaFtfE/TrLc83bQPu0EV3Pap0x0jpbhZF+z/GYB+1

qeHR3q2j0+MdgVzOrZIo8zKAZ/1hoiyD4wsxPMydUbFi2ifpFDL7f0vihfTTgNmygbkeYXuqUxJ3wCw/

tdA1hTXMZmEUr9HP5QwGabqaT3aOWNI4SVHTXCmJqq
G/D/U6lfOnQJXkar0uY7SJiSTD2vjmad3VbkEjIVonX/WSPm/7/+G8lPNMO+cSepSPjndvjJTkNrwugC

L3ifpR06TdC/FZbnG3WObSx36idI3QNEqrBlZ1p7Fr3pXCFhkJehLjspaty9sqwPyNPTHpOuN9OvL0RT

2Lbn9LrAulZaJ4AGbwbUWtup2Q1epc4LHBcrGsWlJ1
evDd29jdrqujtHk97vgSB239Q25pCrepfaB8nHI2Aii/h5YBGtsaeHC3Ow44vSMdKJCnvP4dOw3mPzvJ

dx4xq2lJ8Uh007bIzrFRO6uSuVyExijzSxbKyUb6Tt1BFIeogEjhzAoHWCV2KDKpuJVfpRGrO3ODJZC+

NKd/0yt6MywiKecrQRlt88RiDVrtd5LQFrojm7QGhQ
kuBz/OTWW9aa2/13TR5iGhnFXl77vJ4zrscJ0t+X1apxVl+/p1SGzIzpiMv2c29NelvaXyd7KhJJF0rS

HzP/ShCTtHhRnRE37L3KxY8SUXMJsq7c4iNVfp8wG+ox005Grh4TIYCBLLqo4foil8cDman4YCHAPcK1

xOosw2bqaxZzorQvGj10BGFcgSDHh/kb1ZIgrjUnAg
oErGvTG0VoAOKAII9ZY5u0UJYrLke5t5vcs/Dti1DqoquJvZ9oUVoqoDGkgdjGaBQzs2cmWZB7ZeTl3A

ezKGD4rpVNd9CK8+F7OwDAHufbJaK7uEzVxxnB+ElQ7O26rjHKbyh4BL70IKjecG090sXgnSPptl2zas

zMhAzTBeBPDw7bulQQX3vtVPZsDDPmxTdbyLoozxOs
noXbjbjI6M6p9w1XZxPi+lF7iU3Vzf7LKtlDkw8iM3bJhmWHmI4xsIESZfCvG61hyF9x+cWIDnvcfnBL

lGvlKKuDBuB8NoMwzkHmOa/UdyiZnnMTWnCkObqfkm8ZZUxasH3eyqetvpdNKzL3pTcsINHEDgBY59iO

ZsJK/tgVqD5GXLftwMtS0d+pG5a8GPYr/xaFKdkxdZ
yZwU9LSoGMifDXA+zJhHtromMr1USa1kRojN4C2gM0MILhhF92Sw2pnQPSV2skTd9U7PGRn8Tl1sl0Ab

+5OvKpwlWV1n6ZQX8niaT3DobaIqPlnuvmkLAxYhhkoL7URez/qOhG6NZ2y1lZsxZD+XKmaHoRrgEXUN

v2R94jtO7lTi4HEhjW1UBD3kmaENiMFwaS0RDzKJBb
nEI6xu0EG/pmCgCOBLUeFzEnlN9xG8aiJqxotAYttufUW5+DJnqJWz1mBemiyDz0T7+1JBiFwFMB8+xw

sBKTjbo0yQfQVOkMCNk6aeUOcGjeSMYzzKQMw1TtidAinUG7zspwhtBqhbg6ZFXB5NhFBh6opbLrJdzH

wZNY1REsgstnwvTljG0qviUkb+6ZPSzxl5iamyv8q3
BYgrO/dP0AJI3048HoQtMfp2SE7qpSZMgbyrOVGsG0FLV+A9X81N9rp6Nb76Ww4hDNXAR+nu2C5espn5

fq7FE6TI2l1xu/8NLRTys+N4ep3W5+73NEbTzafRIs7uHQpqv6LGTGxsU8gj7HlDTCFdeyyMTHlaoXO2

KpQzpc1GtcwnycY41Jr2ybKR2eGOCAvSwWGY8f7I2B
8oBU4uPtgo9qyndqGakV2/ii0RVP6pI5oQ5MBtFAPZx1ZwmBUvJqJq85PZ5XnTgFjbZ6zNx9UuUSuAG/

tHEckEknnR0AYVY9eE8Xcl0avaQJQff1oFZi54mPmh5yJ6FLjUb5C8Mvx1JVHJ/9PInP7heGhz7t6mJa

v+cnjNbHe3NQrKQhmw98KhTd7VVk0i3Xt/5mETT6FT
mHdSVoEd70upnquB//m0QdQx4ATA/2a+bC9RkefJBJDf9dweY7FT18apkVkVPmGHSWWAUkKd3NTH/m+3

uxq5jTnHN9+a8i9WbmRZzJ6KbmtOPXY6ei2kxLtiYeovaAzdX4dJJGbk0TuU1ugpH1ricr6BRllEl7a2

fmrkyWncsDd46Vup2yptKWLW/CNVADaX7u/rrCHiru
886A3Jb2NyDlbsnZ3SJTZuwJW54tZJeBKqMQhLLfH9nKLEnY0LVGRzTJN1JzbE71HpgEcmfQzc9dcvb2

8Dq9o0uE5YQ1Ou17V4uf55g+wVjna6ScEmeBCwJelUkyNzJPJ3VrV7xzQMJOrYGNEcBs61yct8oJ1vo7

AB3XWOy3gbZY85lK+/mEfLbroP/k21gqVnApv5Ey03
b4HH8Yd3Tq/cgmdAklD5S6CgL1I+D9/dWKvQCBrntAGFsu+4yGxIWOlqLrexYPjl+QiImpwVlArX4Uxo

6KGYrpnKwZHA7eqZP/eWEe1TXQiqsc38skUltA/Cbdek93lpbI873uVy5j/WgbW7530jXjSKgQOCP8fb

kcm+KePOUN6ax6Qe0DZI5ago/2B1DJHhPGNqlcTHB0
8jf4oEZ/ZgKANKSvOxWKiibGX9KBhe1RRMnI2ipx3fFB85Bg0mVK29GQPfgmwBN+sQ+3q+cbY/jhlX6a

TEi5X2NTiCUyGQgQvRZms7e5eUSgal8Xi1DLXrSLbrVJKAO5pKE+/ScVnZ61u2CZz6laHXxwfMV4pJ2q

nghROWkOQ2J/gY/AarU712wovkPPlyUbqrn/gY83Li
2lCBA8ogw/7dxD1VOcSvpHFeAiPc72t/oBpqiBGz4if4eY5j5EMRgyUczMzkYD72vbuU/pPUah11z9Ya

WT9NA7dB+9mYqtKrAf+yKrP3+A1yaLHa8ogeIH5FK7L2L8a1yoRVmzzsWKQB0VF2127kRdFdeAc7OfY6

Zyf43MHtbjzBY/Z48GdcjJniZcLj9u3KFNbeJ7hHB0
XUeGZ9VRfw20MlIniD7eGoM+FiNbMmRFUutfVJLJoP/9T0deD2F/gYleYxvtXnnHjl7r1atISrJ5vBDv

J5MGaqwomXv4LXGiLecmTjE0RbOy1+l8WwjpwKP56Yr6luWbEQJJARFbLCrU3+AzxPNqBLrJh2pXOUu1

Ma0jak76/RU/URQkYrN+0iG7sDV/p+KEs/vYzRQh0M
qe+owXzIyJLzBFB+mM2GcUGZsAT0d8L2UdrS8JzWG07TGsmt12CCwzorUlYHDHG3zjGOpxq93eVPs1DM

VlxFDnn015kbnLm9WmeH+CHi5tGInrpnq+NumP2VghVdQeZ+LrPAIYvWxytsLNejVv82mGzFln4uJKI0

76rpIu/J4EWTEZKt2YT9NO+O1PSch4eaKzVKUd4Ryg
2cTVSK/AztdR7nzoXswaZYdbY4jfe59AGGUREJpTm7CLnLZoiWLuPqFCmPqJusAExKJU4NnSE3oW5br5

SiyzAVp/9vFr0QlIwne/h+PuHmPRommNrZIxSMqQnZR0bID2WQVdpBjtiLoxJCeciG7N9BdnKRNZ6Czk

sejr38/m6WXzMDZpAw2SAm8LYzM1ElA1ObnqxzDqqM
cX2pvBRRkbU+WUCwaPdac7m0EyflYmKdjFu53k34uo1F3yeV5PZ1SiSixLDaO2lCUeL+OXTxnBwtpBJe

br1kXZIctAWWMx2dje7N+lswC2BYZ5hsKIrvlZ2yi5aiPFDWVaKxk+X/Ke8KTG4hJ/q1+mYxd2nE+OdS

fyENwRna+mKwXijC+fIh0JExHTTK6nLnI3++X9cB51
qmw2fWSQIR2tgiRLEQEm9yzwhPBmDPCMjdePMZLLnFaEl+fec9NufzU0g35iR0M+aiqzhtaVFz8t7a9x

AjjNqLbh6ge7395uPKAUJHds+WSyGqIaL+gWaQEBXuu2WW5ZuHhVAII2YLI6OXMX1sS7w5dcahVAXTEH

3ckt4l4TXuvLARGR9FInlxscjxXFEVQLtNkyfMS+b6
3+VUqgIQtz8hUWcy3gkvpX2UNDUUJe/yBCJsU9sBu1psgffO/zLHmSF6Ej8OPwdpR7r9Ft8Unh533jr/

FlU859kyHA1PcHyylp8OW0x5YLDkenWIU8AlRZyXC70adkEz4knA4GFCP91QGH9fFJP5Mf6k8E7kK21y

sdi2q2lMcSlxgtM28ta4HMpybAzyv/0SqKdIk3tHYU
cgPyD+rR+NDeVBUt5rDg3ws2WBt6Rug0BhLVjos4twC83cLa0l6Knk36Btr34cZVkwtjeuHXyZ+JSXOu

O1sFVLiil+Zp2Z1xh+QRMFmyajw4esNW34yu7X4g/7vrxmzJmvc9Eq+BH6BMK5kqXWwQVb5ysHkAGXVc

ePWoI2J3Hoy/aGugThsFFl3hxwYrAAflXGJzV4W3ut
75dYiIeiTnI8aGVJ1zz8RdlyuMV8rF5h/9m2fhVsqpSAKUJMmYn1FxgY9fhrDH711FfVLwXa+kjbnZcA

l68kp5VKu/ADWCJHdNe4f0RuB7JbvGHb5z0fUOzz7WJ/2zqwjMPZlSSGQkmpV5mwknyoTMEuiAn+Rvw/

JIksnAOvo4G5E9EcxsWzm8dRN4H1ChRZHqIXdQLdYx
pkYpMZG7+hO2WCNlc+c/qV91gHsbGGe/cGxcVuGOCNLlqGlqmd8osdRfecN1NuVcOvlWl0XTUpW68SNB

thfONk8CQcCu3ZRgji6SVnX6EkA3SvZvaM0DbvuZ6IH/N8mPHcn3SDMg1DUDqxOonex0+CzH3ehbK7ea

tj3oBuo12fmx3PdwdOdlD0GHs8mkM6OCxzTl76BfDF
UQYMVEMhZslXCYDa6L3Ssm++nNQdeNxK+kofPCbT9rxiWX9fGRalBB8ceM3gd8et3obaTX7mSL0P2CnL

wVxQ2a8ZmfyWbHF5O1A0DrFm5drrodtHL9frYWIrPSaKVxElqPKh+JTPUAFyd54GiR+Kn1c7CTg75wcH

Q2KFJwkMiIBy68TYehFU/wuVR/Rp7bVCVSY5xH1CiF
Y8uAe41nDjHxM064Owa3duHhR/Em9Znd+64M+zov9vvnIqrmLs/uRk5kXto7/x7/QwNNltk4jRKofXK3

GP/RdELuaA59H+H7/3Hnw7hJKZ5R5FvO54A4Ffsq9xgsaetRCzS98ma78njgCc4rJU2wBnwli26ecJkb

EVSUq2mTolGRQM/lGK1VblRpSsFXUk8GJeyEx68BWQ
/NOVZ2xCw0U+FnE71hF1GuY8A0lBMe+I3JB2VPmgYLw58+EA4wm8oiZSCC3SQzNUVzMdsF3vNjvWW9Ad

8PAYo6+VNloyqP9hc8lh80Dm+ICVWCzrhNwmw2g9wk4tSuKPhk2ZL/OImyy6Rz70Pj6PDQOqDMJMKqrx

dFt0u01SjffbEAamO2F5VzqoMPOJ2P0fLtQgWBlHCM
6cx1+D/aAqbVOY3XwJ9x/7iz0zUZprVAnPW1HyNuqkIBnVvA4IxG7VYi8G+fH6rkLh0J38P2/js5Ejxn

Ecx5ZZORuC1gobk3DLgedF6MKbq5Qj9nKwyHP2BZ+XEjw+N+tTRu6cv0waJE0/w+11FibJzSfxVLT1K1

cz1CKhVmY+bod2twvLC5ut0FG94Lss3Gxr4T1ju3lm
l9WZTdI70tufg5WeqoZhZHBVHjvrjJ0AwfQDr2/FqlHVRny786pV61Dn91g3mKba+h6M8O/wqpfnIwxs

nmhV0+H7PKWAlEVayfnNypkTWcMmwzoZoCuzRw7tStEOjIZm2s/LHSUGcpQl97jNi72OEXBBpbcMZJa4

lraFOof8hB+ROakgHwVyQKyJR1AkM+CUKeNG8RmoEe
ATpjwTnE5Nt/EB58fWeyBMuUoGKy0RCwi2h4TS12sdhNRBN+EStrwWNWUgnOFPiGdkqPbhWXhYcEygrA

GrBBh5IvKiPPVlYM6skn+6tL4BLohYTAjcE/T0gJzE7q04bkT3Li7nkkBYwmtnxfQw8qRWkJclwpkBxV

rsIDf99mqKTPbBwZz4KOOQIPaVmqdWrVJ7PxLV1Ocg
j2t1CfACSfegonhIbnKge2ps0Nq4nRFngI3oSaPuy/+H53zUbVbSUvmvveUP50I7XbIA6pvHXERJqgPB

FSsQnHDlxdpwK+7qk8O3tP0fAPlp9EyB1X4RW837ek99szLwmdtExPDvclNHOS+9fUT6kOZGLte9tI/L

zTqHpXY8AMZSDBqhVtO+TSogM2XDk3Y/wHQMTkwp0p
q+S0faUDjUl0OeKYD17Let8YqPzmvmiaESKMHJAkaURKhh4054FXO1No70LRncdzggF0lrPN1NQAGejT

3YESCJ08cUwMdDT7fDoUEDb/W9+AEwquCzFHjOxj3I23fRyPr4Y2Fx0cU7Kx5RJpsb9fJtEiaGRe80+J

Cw7Lj6ssg8psOgfCa0zbRqoXIXI/+RcLBMee7a+GnW
RVTXmHq7mRDYt35bYDYzqfZe8+w+WMIFHjxvDfNkchSyfJ5dlXWq4qSTRdL94VA/+3/E64YLe+eZeVfY

ZMvbLxD4nBcRbaEog+YcH05+5opgabuSs2Bia9kPqDzW/THwWL89XI05eAUJOg3WV/4jpjSgzsrzMrKH

+lz/+KcXnPyotLbXQe/8i0sBG/LrxK2Y8hEQ4yUiIw
8keI5sLkopd9gmW/RjkEqc0kXsk1+WYECrMT4GbQsjp72QAZu8jxXpcZCj8tb5oO7ZnnHSQikAcA3lQo

yJW+VfJQa3C7af2yy6Cj1zrWq4fWn9HvI/Fq+beShL+p8TGLqk6z+cAlDnRQlaHQtYqwsd4X3TD14BiQ

/oBU8snrG8Bbfo3FRJwAYPwpKgL5AeQnzvMnTYtpCV
TnL4RHEBennwYYJDxRXQWloblsvV2Nx4BWl9k/yrs1acqZqSu9Ifbse+Bp9nwRWOYeoOL00E5IJ6U7jW

yR5qjvmtOyXGcd3PsKdMF+/qL/njIq9I29apdePiLkz0MeBGRV/iM2CgwqUfBUHJa0GY/SjkruxVkn39

0PPagv9Vztb5Bj2lLWn77r6+fR3j8NWT2IhjFxW38N
ej9dsRt+/gHU6TPS/dAQkUIreS6xoLSEw+G8VJU+Ds0gCrUvcrCA5kCczVFpsuKmsiCta/52ayFXI2oK

S/Gy+WOyWNcPEhTfQ/CbPtpE+F1hQTojIBTTrIiiqYE01RfdamkmHLv5I51DlVvmbqbXv188Y0Bl8NY3

mq2F3zsJMTTCsAGEQXNCP5r843liq6B13P5I5m8JEE
uYTX2lM5h9ib1B5l/CWYJL0OQhzE6u02Ji7Bh3xFF5EC85S/Nh//xZZKBk9BB7OaRbTX8Tc47MlFIwpj

mMMwQOI9CHm7bEpUY6UlCLY38esS9cLQ662d3xv4vzMNb5cqAvd8MfzPYzqcbtoem+f9plkGwSnN31ds

fllStBEngbJmGf+Xsy9tbUSwgPZ6Muagt7hrxh+7Sn
Auuclm1skxLAICWGTD3NrbuNnunXCHTrtxvcicsQJBSrbg5oOoWp6o02IpmJPpHwv29DqAT6wwHSuKzl

AHhGW8acFT8IZxxtMvOh0XBgqbNwlLZwRVccvjL5w5wS+m08LgnFmzdlA1KRqrHXL/+YXs+8q21kYl0b

5SUF0rthmUC1uQJfO8y8RDp84vBFHZ47MAjaDAl7S+
b136XiHTVI5/iRwCU9s3AcnpZpZa0wYfY4H8NR4cuhKpN7F3oJKnPEx4fcLCSCizv4VTg5bVQ5f1LzhV

7Xk4ANFhMfwt0joEfi7nSuDwZE9EmJf4M96ucnLLsHYv5TAp85XznhWOWeEI9r05RhgkdibAy4udRuYP

RTKfodnkv3w4I3d19u03iOXxFXKtC1ed2PZQS5WbVN
hqzxJdRmOoUd4j3gAy93CjeXRdakRhrMrz5dSo4XqXRWtk/TysdyhEksayUcNgilQ8Lhgysfuv+nHCnR

THpaw1O1elrs01PoZNIFOimEra7Vn69a6a9JuvYtV6wZsmH9txloeMtwmFF0Jqb6n/OKwdUF0zYom5li

2+mSpvPtimRGyTItEH/ehiRJ5QPVKwP9o8ycb3P0YL
Q9d3cfbbKdJ5IDXFHTlRa9Yd7Epf/taPurSgwAVIjgu3EISwO4bWm8TxavTW+khwv3wlavVMwgUhlEDk

eFkumhN+ukVmMa/HVWqUY7Csl2fIP3fV4EAVipxFRtwljYobzwGwd0MrTdkAhpVZ3rG21lTF8Je20Gid

U8nmSBekz/Yu2OSnfRSnYeNvCII9FWecFp/Y86/NqP
MTzR+lQwjZhzS07sUFE+7udxii3farYa+G8+2v56yX/ZBd3GMSHmEbeo/eTejUWGZrDgV3B8uA0i48Br

QH1CWAPoK5htSy1kJgtzrzSumLCewKIrhGt7ECkiEe0Lsi3kPm4qUWYs4a1BhW/QPTyoaPruk4aWPT6s

283Q63T0G6QYT/nsbl6eQmBGXF2FtjWqxpq6soZyJT
NQNF+u6+PDD5K50FcVGNDWclKOvbPcQpPybJO6f+Gki+k2FQ97hvqi5WGFiuQlOY4LDyjxxco47qbJrO

jeAH+XQO4s3TtD0vfY7aEifsxe6N9EFqCdS3pkwuJYDNcjsHVytGOpyF8wMb2xHUTI3bTaEPzTB28dHI

4Z8N6JbHe2kkutDna+/bVGg7OONVWJAvGvDGOV3Alx
34eq4tg8BqhJeiugdj/mkxKQcU5pEUq1YgbyQ4aa9Dga/XXCKU2px5stf+54+vRvgeaGC2mrCR7ZqFxr

JUxqmTEjh7omlH+vev8oSvdZkJQJCD/YEV24vjprdqoWUU4rI6haD38MelzhtYhxF/o4ToqJVPgp6BDM

FUhEti3u6ubUIvyR5/lTQB1JoJY6345l9HNqpQm8WF
9HM5kD9JWFw4rZSvp56S+FhDupi2QWO3S8RjO2kU67ASkPezfEb9Gx9Ju0tG+l9HYGvEop5HX0SXY9QO

er+Fh7L3CYDqcfdaUD7aucvMt50QoYwhJi11DaS4TZ16oF141SE/XyMxFsWz+ObLmQdVRDVDQ7iQ7so+

w9EzSS8xM4GbUdOBs2PTT8OIVJGNm34WuvpmTVpgg8
KuYd9bd4BePOOg8YUOJTYE974EWQig6iMfuPxZf4zY35MrPieSwzbp1Q6DeoJo2eYWgvElAZAivZS8+t

H3/c/oZBG8cHd9Ds+BDm+sQwj08TEFYl7vJsH+QLLEqe+MN5cflF0r08F7m8ZF/S3xlHLeDGZIm/bjyr

behny+zTXXfNQw1AYFysosSBWyi8g+Lv7xDAuxuSv4
jPyKUjldaMtDBgmKdA3fCPtwiGuRkb1SzWlT0tn7t5mSQmdKXmfyMxOJZaTFJoxMzS3BgEw9OM4McQh9

p8q3MOAEEzkKic2gNRVLu6ztzOky/TTG2G5H+Upkig4h/3HjzqI60hsZ7W/hlQFNIx0R0JTcGlNxuC5A

hI0wm8JlObhYKSiBSm7b4L4cXqrAJkfmqQg7z4+sAt
8umxCfzdzH2ixsUi01FDjhZpt9ArN/lLaBEL8a5v7bmxQRNmawEnfv4lrFVTk92PoAkLa8WHjD5F/BxR

Znd9CZmPHkkn2wBi8SUNSFIorG+Ti0Fiyw+qNvfYSD0V49HLewooqqprD37hglshmOeY8NXRIfuRlRTw

gVbheczrX/gsOEmLNTrnNaaNkwzN3GohgU0DVmxra7
KvxXpNw9BGvCAjL6d24wS/4uS3h0Sg+s5BwIEYAsyN7U3x6e2h/dk83tlEhJnI+Svxaqlw9jhsyd65ua

qFrjQGgOcSCvKeu9DMCES/WWVAWnB44Gois3j1vpDxe202HDK/YXCeyLchM1L1ZGaEqRa/BxyUKBfYnT

p6sq8lCFg/+vChZQMuY45jkAe5K+w/2v1R+T91tP7x
YfEF1g/dvNQLS6EftGypxjBuZ+B0BLwkWubyDJwgEdhk+pefMLKztore/LTPFUYdJE8OC1aHGjurb7Tk

Vdn/3/aq4X/AZ4uQ2JIzt2cpdcp6R+BxANTE0yHtWFVXutRuiinsw1iG8Ad8poBoiu/1n36eDny1+7uH

5vitqBEH3JtHYcQfWNcHtq/IbYk1ttCvWC38ENulJ/
mOxxuHPn7hLUcE0FHekU9/82MYDkbO/We0yjIS5ISyHVm3zSyefzZxXy3hrq/5o35HdRZVqJ/m7CcDHT

EV7VZi0q1CzDwfI3x+rlDhFXJ+p/axc91Ydh0VEeTLmMzXS49KII7FxbdmDEej4Krtm39pIqAD6F53/8

I3ptwP/g80rqVwqqR6oRPFyBjvAoS8cS37plQgOb3O
RQTY+Ud401097n6jBDj/9uXWq+NNydwT39YBzHcdopwUWl/8DktZKoagZNrgyMtT8ROmMJI7rtBo+FX5

ZNGVrHf18QqZ7BapDu8+8bjixZQ6vSa0+hINctn7RUuFn4Olwa0NajXnDCbixflNAmtD0hUpjsmWSrrW

zM8J3DvIKf1FhDsxA8cmjeIXJeca/cnkGOA0PAid8p
AGq5lVvsLoqrOoLqtLgUi9qXsdripb81jmhkPUyKvnT9OnvttPyEPkAq04Btsk1dp2EjPRrsrCaOwWAQ

IlHf8kR6U7bHLYuwsb/dhLjdCjIN/nSpsi4uw+k33lOyBoqqiPrm5pcvMTlEiVmp7QEef2u70RLvOwj/

x39Kl8RH2i1R17uuGxkLPX25D948N5CRKGJaSlBrGI
NLecTFl3B6oIeQA/n+KuLOpmQD40vT54+ijGrMctNECEvyjsL9zkB27sCL2hLb76UTY9Kqi0BVUoKbnD

SuSICoL4nfig7cu4ac+nwz7VkgT+vDB+1nuSDUY9WK0yZ7qnHVw9cHBxC//YUpLxpHMp8OlDxICN7950

+0WgFsQ1oeVC1FlrSxzwf/NxmAA2TcTSC2dH/3AWd0
Tobw7Nsdw2Q4T+g4FzZDRcoLFNg9Rvm5wWrj0+jdlU85nog7MMTb9WPN4W48OZEYQ5Pfp5vkmT/kh8u5

zmov3qpiTU7StXGNvmZ1+teosrqcwxstSssy6x06yTjJhCx+AeFbfAv63Csyngz8+kDvkifUfP40kkvF

Owg53SfD3qvOI94YqH6CI4/vYp/lFmQC3lrSQBUGvW
DAtFCfJwxFzxHPmCfB7/q8f/ZVAIYzcrGZFyFrFofnwMkwS+9XrkQvAKgxRzncVnc6uxVzbWh/T9vF3h

3mJAmMUVJ+8C6Pi6/q+0Ez2Mw1g76cWISKtsX1rC5FXzYjNnS89sCLlV0pFaxEfux/+tIfP/y9hANZNc

VWjF1qojxRIYjSVIT8YVFt0b1Ujw6HHGWZWHcSz+yf
NZq5QdcpjQprgsNIIMtkpJI9TawYMf/rsQ/YnDWgkidIsgSFI9d+7P/fmrm+NyGMtH9K8OzqY8LZbIzi

JmSpmESBvzzdy1wb7PhM5NGokBXyXhDQQ1cU8VxBiYmpnYpy2chvKK0HincPYzSttjh14oi7J/qChUln

/o/UVu+5/Z702WpCKdCzL2sSmlp8JCgjDzJ47nvQfP
JAp9IkoXHF8pQ56TWNzpraTK9Z8VviGY8c+Ku51Mjhb9Zgq9MWrjEV4YHStpNad41Lru21UrbUWKe46L

7igaRNgHLqoOx4E9wnKa1crWLnpziTmoR6DH2r6xDvF9UhDuZWgG2sNMQQyTReySIxcjjyiKE7Xqgp4g

Jqk9VaTRElKJyFI8UW/qKwDLl8YxWCXviGvgbEbceq
AjIMrZL/DqYFn3R2k8D5yHLV0veEC235p8Qkqi31e4DVz9E2SP0gwAe9Xi8g7bDShUG7jfsriUXgGHR+

L2OTFLqqymp3Rl+VrXoOuqz95faOHbfJP168B6loFsGVNwrKcnR855Lnow3hfwNO7gTCFuYUzruSzHVV

eBxfnT6T1NZU4wM7nxF5Uqs6D6fDKapdWearyn/Vla
VDICoCbMrtu8u5kjDTV/FnR0ZefMf+Sfb7/lQRnavX/hay2QzjROO4fi6/yv/dLRd9UvZMVdbizhsa0y

Pi/onDVEFEKD1Bb6mkibELecpJTN/atUaLuq/MbnTLZa5hW1bl/99l2eF0CebsjzVNjxjgxyb3ypHxXh

mnv0ZVKTF9CYSNwql5lG+nohlGwdg8SrOl1zjnSLUV
uc/zlJEZ7pipWg1Mj69dWjBqY+nveDSjT1iIq9QxvsV5TEVYvjyhMmnhsKI3happv7Z20zjsLe9Ql+Uc

nZaS9oEQukK52Mgh6pY+znpiZw4uaPLUMtTCYuK3/eTn99p5FAKmCA5/91a8Hwn9ElK12AFjU5wwPgWU

CGkGcHtu9rp/JVBedV5/foVudVsDGil00pPbEXUZa9
tIjzdoHzm2SLkUUvZ1cunNj0bn5yFhxRx/g7UvIlyhv6PwLtczx5OfFcjcRnJHFvDy0kgQ4+zGQBVlRo

7SdA3OO0acnNiBJqatmO+K0u/nZHDfL63iVd3KxHf+YaQjpIcwxXGfcEbLd3sstx8YOqrVmwaas5JQqC

tjaXOHGCuJmeojhx/YbubZHa3DOvTjDMhtUL9kIKnH
90UATb09s0n2V+D3HfwwFaBO0oIld+c9gd6al12zgXExhlS2fdB/QgP3mAcLB7P5mrMJiV0ZaKd05x/I

6DG4pDku6wnNV1uUwm1w/GDAaja8jmOiyk92fSHKO6D3znsTsQ+8ohxAGqZyn2ayjh+6DEbZMNc+7anc

2cK375wg21lsgtkhKz8eeJ6vp5IEPQaivOQImV+GVV
RwehZDLT9bEUurTU1MUoitUHhh3z2hlJuCJYbQWU3Rhgg7/utG/6lKvYKuKh0aF2cht2ElUcygbjeAtp

b5rDV5/qroc1Yx2s+Geo+YeG6qAk7nbHVptvQGN3EIe25EqajTdCHgKeFKmaHuNXbHLx3CD1EcY2Z27t

4KJcWlLs54/pNwm3ogyLB8N/SM3O6Paq2ZwrJ3dgMX
NtH3mg6YoxHT25XVFelgApqEaA8SD9QrP1QSXA4GTNjU3JHIYpsBdWDIPO14MQcdNNTTwqvfkDxosjhv

5q/Ut90rig067Sg+y3e29iGtTjiUcBjGOz5uyvx5u8pjBRyctiZ7stoGntsp7FiKeTJN6pBs6RF0w8ij

kT9J9onaKzYhcts7x0K9uF8eKWtoWPpo/+g/WbKtHU
yDN39y+fsK/+GtftsWczXBMN6mF3ZOHTJrPq/Jb6r2+rmRsct5cA0yb6LvxAIO6l6nSV6z9Q4ZMKk+Jr

k8nsczC+Ft6Sm3FO0j43vZajF1yKOCWYcbb9WwLNiQCDtwPBP8MffvlWiFkiLUzHNulaGHPsRB8Mre5O

hDJ6bqqqb6wsAE9/2A+bJCckim+/GYQIBm0hYTGf+O
9alN2B1QxGojL7GZ2l1Kel+SnpOgNbOFGRrW2olBS+PKmHjWyBtoF9utP92d3mrpg99dzEDDwxEgvJAp

zrWlRXAoB2Ujc6skNFT8p4ktnrWbnjkRE7HzMvU3fBIqdDdE8Rg3GEN2jFjiJRH4WkF7/DUNh2JL6lPm

vbOC/+bNsCD89Eqgy+KCUmyH0Xgdf/X0bhYgXvmQtM
vg8zz+pwJgE+OFarSivLA0chal1gOmrnv+38jF2DdP0ySYUlDb00oLgr7twMiEAjyIpj+U6PQ6dw5Cbe

bptAZRW4k+lJGyDuAZRB6H23tP0AZziNRX+PWDA/n77H/0cyT+BPgk5VOQ0vhq6bf4YAh1j3UeoL7785

E2Z8nwF2dLa8RgEsc6rfLH1dHmIARFMEup0hEecy2u
xIiLRtfbiH3dXRAdA8DyZBCiOO+eNsSUvOL/CJWgKIZp0O78Usxv+K03PfXo1NPQySJq2o0gcLRt/APH

BV3/pklC7ojyZfxXBlM3d1YcCrD8ezTAZ1IU8y3rNEpNMYJShba5EwXfOkblr26y9fRCNmLIMBnUfkBr

izCONl2iU6oHQJKMN7sUSmu1Ic9usSa2FuI9Vgyd/y
HgPZqKlxUOOkyn+L8Q2EzO+atS6QDwBm/0TZNJc+Eq0g5ku9tG+0ePTI5UA9opk7NfCR3oMndqjc+Eor

RxvOxo4tYQDkVXq3rb6EyBxw556u991tosao6ORG6jv3NgKju5OIai/ACA9MSgQMEw9k0iWNqvUcFLVE

VX4dFH+9XuOhZptJH0QcyEc+k5DFSjRqkD0/BhLM8r
YP1YrS9FPX2VAX3Cb3kK803LG2VWO1wLreq6BS9B2Pfg4Wf+3/d9lxsu9sk8KFH0NQobyXa3FMNVJ27o

pw0r8AXCPDS9CLe/Z6Cr9bDjZg1kRxt8QMdYAOBIqnAjNVAWR3ZxBtZlB7wWHiW3DYx66kNg2MHOAVvH

BlFXvwawIqj3Lr84UNpW6VOD3T7Gq/XYjNh66crt8M
7Y/QCjsDlc2J+rxJ7qRDP770eGGA+sh1OaQ+0I3BeSBGJ60BCNn4OLszNoTT9gEZkKgguJ7S+eQ6/qWo

uFIaUvJAsQEEVRmoIlBFKdAs726dwSRCPr0sH9eh6VQf4rHnCadRTJ2ebdOyEBMpPOHY9eTVwCygP+Ry

6eiEweqQIX3Q9icHvslL3RlLhjfCAcl0hJgORxYwJe
79HCL1BJd3bxRRkm7jmal9mywxYg+ZcU1mgqVRx5+Dzmym0oi4gipvXSxoRS6MZe6qlTlXRsFIfQo7d4

tfWvt0vdtxzuVvgl+nPm1SgYbvVgtRvOQfGxgxgplHJo56c1xNoloBBlNOvr4wU+wgg59kPMbJ3UOCfA

3puu+8f88pNb1/tp+fMRXGBQVIt5k90QTzlp8FbCFl
KJcfpVOEEHMJaPVAwY+Mr0dTrqMPrZUyCfCa50PMlBUvKrFcrQ9GU1yViXOommwu/pDfphkPJOqHnzPZ

4IV4DW7hbXkLA646iCfyJuibayUzUVuKe5GM/3oyjYbDcDvV+cQiv602TRkdEezEOJ8welt/NidWqft1

uB1zyP4aZ4WMasEPzU8fmp+Tt3pma204gzcv7fVqCg
ndGta/U/8YQemHAfzjsRe7kA4yy2vGhiOjy6zqa/7y8MC5JZsH9cxJvUbYmgkkvTzc3YyvXvsL7T9/33

Vdt1Pdi5RXgIKTXVih+BKyBANe4E6BYXSqDU464LDg2iR9fPYeFnVONC35Ae1Zco6vZ7MEuxLw7XkZ2k

XDJ0aIfW0I40tbE39+Z+CiU9WDzaJskTGkai+faKyt
uzUP/H72Jw347p8dzFwIO96m4/yJ8Q8CeQqKCTH1P2mxuLFG7LUJcSufB3ZCL12Y/v6RH9L5nTE1ZtmG

yqLfh+g5cUePQpqyIAVdNx4f+Fp6H3I+aGdj5cIvdoaD6bRHDVtvtw0PQ/mUuZnxQyiNMa2ISvy9gR9V

a8Dl31J3VI72uKjBeZfF/J1UOYzi5vTqckCNSqOtON
gcoPOmyagxUUXJVAmnT7Wf/9QmkGS/c1vhQVNc8N3oFEh2DFhGX8fM8odJuDiwXw4WF2PlmZapYA6jaN

pSUAtk4amsRXIcWJR6vW+p4a2YOedqniniHg7HX/j/XJvk1QlMApmaplBf3/pOfWzjHs0ddb8IGsVvt8

lDoNk4GjCg7hy7pv1I2G9z4daW2k0o5oYEe4aMzkVz
OpTemcS4aZb7IbyVS9vdGEarGfipJE47VLoBCw/NRyi+4nkjK4jNwgmaOUiEOhIgtRtQbGeDU5oRLh9D

X/o7c5IzTBBQcuwinzipF8J/oxHpyH6i1jYtHyjA4mQkpLj7QPx8I4kIDmSgNyWmoRNK9K6S77nsZhSh

cbIfRq9zCVo0MhX9BAHpwgsygYo93CPssoiJAXuKS3
HJrLjRqGvWh6gsn2LEntsNgJMe+KtpD9s5CydJy4T4Bz8osfybnR4siI3mC5Hx+ZK6+kpQ6H2IM2VW/F

jjYNONZgRwCPz0Nr5W53RfMR09rUkyW5VPVhPiN8bY+JpgBG/aYjwhE5LYsncljPx6RRUob9hGmTX2l+

2IFFg653YA4Rq9r2xZ0eGu53fzozTAIGtvCXuJqKwF
EVntLutIIV3lG23nNEIcCOuV99zv6Dd56T07VRtoANrFenFzIzal4KBsHmZ2s7c81eXNGh8ILJ9+yMDS

YCxdcfmW/1DZe+QtruZm/wXZE5qyfrl0ijxrsOHjakcAL/cMZEoi0NPUAPXN4DGZTGDXQKG+j2+PRKYY

2WqmyZ7ec5uTk6SFUq4PB3hHLUjJAhDWFbz8dDvSNy
TCh9Gq+5Z9n2ZpwYedlWC0dTG75u2/Kf5ojIK9Yi0LesgUhu2foq/zKy0BBtaW3+5GteKyfq7E2nbIci

C7irRnMMvcW7ECjNS5B2Kahl0keP1C33s1XYED26mZHpBF9vJxqFYFqOG0a/u3uqffC0AQ/XIBo31cgg

RFe7SORsCH7R9x50mNjKRBc0y5tu7y/OjU9uawgPuk
BsKkyz0c4GoMH/m7PCgLdConp+xcUu6gaWQIe5p8fndmoSYkvh/E66GlsyOu5HkvvW6TNCs0LilnuYXB

avolOY3FVTxT3HsjgX7nGNrlGIINqj/0JawiqFXBDC2YCDirsc8o5f7DKU1ulg1/OKAWyubOYjIeWpJw

QOw0Tnll0zeuzHZ+0+o0rwPy7w9fBdo+nXwGICe2oO
2ahmpfTRQvlDR3O3FChu4/RVClxHO6cK2gH2OdJKBeCp1SEWEjXrrta+F6JYyp1PqMn7xX/AVlH3P1Al

zOg+zmRRnC8NlmlbwvkaSxPjC7FiGZBI/54RWve4PY+FsEz/A8bRJksYxjYDAPgBK8+m2aAXWLGvMpnS

1BwuBINBHEZ5ID61zWgy0JgqLdIhQwu+W4iwpyM8Ic
7kmlZRoJw1TWob7akQtz0mVy7mWh5KvYdGL6bJkilEUAStLpmrT5qPEBb4huMafAGJfSUOOrchxqGAVY

/R01Otl8OFRXTlzO07kn6Cj4L2Csx/FIs9DAni6Zo8ZXyLykSfagkegJGkXEVfILH4cATMr5/gfjQT4r

AF2rGEZ7MW3Z033puP/drFTZQxBxdhQVPRI0pxA3dv
L5W5RFp57Ak/otYk4Tm2mvhzSa/Uxm+DtAqoOp+fr9yAU+LhlzrJDAjZSsPqz4rwnoG3PAL8xPmjQTy/

lcGrpVsZnumupcf7NMWijd78Kf1Z5wozqCTn2pfBD5J82jcZTwWmZwqVV7/pVXoA4kN+4c4MmxNzSFnA

LLBWqDUN4Y1dqK0DvpYUIE0ZEIhfA/M4vxS3yNg6d5
ELi13ab9tf1VJGXce90+V2PojaG8zrR7y/sEwNX5K8dIXFjaEkM5qx+zbDKfqjoeGNl/nOgERNLttJd8

JVkPOOaXITayFBeJAjrT6aloE10sKLXMB1wzhY+wBgWIaj6SZAdXfi0hZ51k/BwRBuyyLSjws4ELrTyh

+0ZUFun1Hkfa/6vWkTb7/qQf+vX5mDG4EtkxYq+Moore/
7ZJetWttni6qFkeSSDrqvDHGMg51IuKD65oA0lpVw5hAVE0i/8rt1ZOMyPdzofW8heefWYwhzVVJrmjr

ViYlhgY7WQRzwRfzru0gvXYb2gkDc/CFfsyoXFqQF55+FKb28ONHttr2A/99r4FnE4AjY22TwtUbLiVR

ROxdjQZ6h8ct8nBttuL+0A+Za5sfp1AvEz+C0eHsyw
zUjuQfrlPe6BjbDJtsJXZvv9CXKd23nHGl7rb/E6A7fhHOJBRryek5valEQYDBfO7oLCDl3CKW49qRiP

OQsrIit4WIj94lEL9VAhQ51qHcEyNmObzxoWm/tvznSxJNyFbvIoc7SVr+9z1xwy3mhP5gem11OXqSBm

/Q/varatSDx5IVkJV1IpUYPUJ1s/uowmAZPgn+ZN4R
RebbF9b3AnAkoFBF/zX1ZlXFw3qgQbKfxvw434fxjYuD1g8RaP8OfaIh71dvgJVB2a79hzBrdv/P0+yB

BIUR2rYdNm2AOcC+aaDO0S5O/ZXNkBQgjVysO+vqZ1bI0qNiTjXV9PA0Ilqg3Fap3qOCfbI+dam87Otb

QahyqoT6r+3gOqm6+H3KMFk+U/ro3BBX4uxwOqX9+a
2Zgznlz9qpr+QEB+xjDpLnYwXmkcGzOUhDWhlL8mYhba4rtL4LFLVy0Jodrw3GgxKs/IiIPOwlnVW6L7

ggVmouvTGAor93QbApMwiIVNP9ucY/Z29plHhoeJFhTvU4TtXBcbisKOD53vrmpOTcILTnvPFQGrl9xe

4dpi/IBPbgMy19HgBYKf2e60eW9H6B4sUSGkBrwRqM
qiLo72vRQxmp9hPTHh2A6CR23C72o7gsmTwFuiR2hDfGws1VnI/ltQ9sYifPrxOZhHkvhyHOAH+fZvHx

P0eMpqHYlBbhayBqZK/VjwiBwj1x2hIftgxWVW6sCSQSgGDN3u3sh/jstluGj4tCDJISKl904w4U3ieF

eVPf1cxxJGKzQbMMGb3sHmsHyjWrjT0UB9TBKSaRuY
9umIEp+Egd5k+MpXSPP3IvYyhKvvARXJSTHfXRw//1yfUOsJWYuYDCztEFK6JjFDsy0fnXffF/u5rd+R

2lDhLpmIPFqeo7B4AgCsbO/oV3nIRJHQeNgVD956vs236r0/yegoZiO4Jn++qaMG1bLsOtGeYgttBAUU

wVZ1ywDZDpIodZWbalzM5oDBz9IXUPFZOhITUBNwl1
9/fKOCse4i001A2CI7y9iuyTu/388Ye6/5f8/TT1gOtuWMW27QULEdUHK5/meGvk9+DJ2p0PCA2Tz3nK

G4kMmLE8vWrd+W5eZh1UgbYeLyifAy71UhyKVNF+SvNNi0WR6qkAVetVEf8scLcxQsz0+igskSaxHvG/

0Lx4rii5dzV1NxKwflqObf+6cae9HUrBmotCLRW731
Lxf74Ua/jsRGM4d+58sKDuur2krZ+nR1sTSoDgboB0/RKUP5V4wxhpLEwhpqvGWICfqkqKpeYuVaC8GT

ru0mFcOnS7pxjAc5eDVIhsdAAPiAT1ohGBGrU4agU4XT4C1GQH4DmZmtP2/NnzvFGQqDN6OeV5w3Gc/v

Y1xdXEGU2w7weMhvqyBZRFj+dKci5tj3OWpdN7TdZB
Hn0SWQeogR7c5HavA74b5HgjnGtP9rxQ4PE4JG4SubWCOEwMVUPBdUC6skaRV5bYjElTc3IeEs85cQZ3

/4TZpWiVF1qKd/qYD6ZR/xoQLepKyhe8KfmZ5+5mq4gkG6F7HRpGojWO4M6pFZz4xwGLO1evt0mjSgrE

x8rPDFRauWhCP8WULY57xQAZlo5q+JzTxacQASXNxg
0Zaz7MkFPYpTOz9UrUEbf7JbdPaNnfC24vJeoZT3xFcuKLbkKPSki3/cHRw2pNMZKLPfQHUV4OoQgg/i

pNgkxSXHf1fEf2/5sovdou1z2T4EKLRe+LcvyZKTG9C0Aw6XKu0EyjZ+h3JVCO1YSLnXy/K0PCEnLKO8

1lFuTI7N1BrmDBy8L0VrZ3HsmXfH5skrAMEiNyIZZR
k3nx902Y+WkMeYxaFsZ5+Ut8Mp3X/wVoMCAn3TMyiHuV+GyeVykpu2DnKQPGK9ze8tX6jkERrVhjEcBr

eEdFjlsCeiQ/VE6EwWaECcjUbFd/EpT9OMZ8s88FPhDc98mNlB97X7bFwgoDuPN17DxPIhG5ecFAeCcJ

COeUTyviJwJ/aGA6oqbNaENaOeN00rPR3dhlgfDT8f
xZ/z+rMuyaT4CTyYPpjaDYvQ3frFW/P3ph/3cOUyz0JeHUvhmkaZl0TH+jXxmfTu6Nuw6/oo2nwPGIYl

RkE/lPvxeYnm84Gm7VKHUudOZYNDPNXkKm7crjjqu/zsu1JUhS0BvsQglcaYtMjJdLY/pyu8pPi1iFTe

gze+J0BjXQokGowMdtfulNvLvkCjDZ3uPLqYYOMC1P
62HIlGsrTJdoZOgay+UUyuwI9KDnURJ2P4SOk2WD8pe+SRBnmqmO2Zt5Uxim6mXDs7z5hItlW7J6nhA5

E5k4vsgqdtUZyYYrQkEp9bfP+dvP9ZQehDmJGtACgJRKPno8UdZ6mIecnNB/4ykvP686qPW4M5ApiyZh

7jJFuxj98wShPHjfsSM535pYW9Yy5RhLPkZJgvSakX
k7/FJ+MI0AjeD+bWeIkXCr/UDoW2j3/bImb0WFrGHDkJricg4l/6K+zizGWyPHqIXsZrfxDPWeI2KdLl

d54Z0znDSxPo8WsCQEvRXvyWtuSkuM6DjbVA7boiyf8s2ouxTnGg3okKaKcsrnsbU+2830ejT0nGwdJv

w51c3LwLQsUaYlj9xc5xf/r7kd4knhKweaiU2j/Mmx
2ytBqceMW2qcZSvDc1Gje0n0Tsu68zYFILW6mnUQW8BIPLpl8cH/7u/qW00YZ/mjVN2u92WGBdplx5RO

hEaViro/Vg2H/NI0+YW5QW0Hif0LqGS/+Fcsh+qduPk/xUxb6X60RMFvv4nCjdecxuv+DZeixZ2FejDz

CdK8V4bN1xMnCQckkNeBQQ+GWdm8xLLZZANzRRxy/0
DtIuVW5M9XGHcKdySik3TZvontDE5eUrkrgG8sprGOvT04dg5IBdqz1wyzhJsETJ6O2vnP6wXD0a5Kz5

B1bfvS2xCJeuVDO64Y3X4I/9zNEGpW63S4N37NnoM9xp4i/C1D0rn+WdQPZeMdqizUlMZSK26XuPk6b+

MIYEZ0LMQSp7shiomEu2Uc7MOSCO//n8i4l77MLIa4
KQjhucQcK0ag3MfEK+y3ULffp8UGS5I/XO28j12XfkbSKzKd4EO+eYYmPdo8lSw4NPfGoegTsqgC5tV5

NSLQiJ7brghQIKhwZwK//R8VLti7DTXtcOowRm3zc98lZoghGFnNVs+q0342iVp+t5SmHy6DxBQNqr4y

gSuAsyLxEIcLs6cpWWm9xOBKwUTAuT0zTBtrlrk3LI
9w2xvbMjvap+Uw29MBmVca5fut54dj3gXpZs4zxxqxlidOZofVAMS4CimbyvYr8C4SpK04iSm6wt1oQ5

6mEfwycTr0u6cSb2jgzuZ0V9EzOTV/AXYmiXB+x8fmEsY8ffkmgNkCNjqGBrhou+B5RAxbDN5mvoYw4U

RfTekqZm6ZFpGpydBhAkgGM8crMJqYxyFe5rOO7R3h
yGvfmsZWO4/PhGTpW9BSBe9hc6YURwGnxE7Ew8hZWBHqm4bGi/SJAYmSrKjpPjsDxgNk4y2WKvGuW7QK

stulNNQ8YCgVKr4H8ggKfl6YZRc2IN87cC+Mtxvb4zEN6/sTQEE65ywAwyyl6wVTRDZcBt94cqlxvK2g

lVV82j2b45NxIVifuSq4T7iNCBybTX5m80dzap4HDD
nB7XdE9hCodW0Aa6vSk1eJ/5xIN49Tgv+OTqiWlWFRXThVng9yQkjxB3r5pswkIHN5FPe/xQjBl38u3E

RiJBsbzmSiPIYorfhWlHs4LHvsbuxFfNl8CNUL0zC8iZ698UapuYbhnAYb+OaLRgajZ9iNP6OH6gMB5Z

C/U33JXr+eZ9e3pGyX5CrIhjL6Vask9QJ4OzitnYs+
3lY2rUhAiiWtyoRI6MuUv3j1mqdFpiUiN9JibETeXZp/V16IB8NxhFYmiIjbranMlaEo5zxAsKCd2bzZ

GdJKwxpX+W32o1WtcPxiNAcIjoczS5trVChV/Z148enqTGgBHgpqubxBH9Dhuj+fwV/9gzc3NSyUT2pb

3X4WXNf0dqY2yVnuTXyp+uZWALegbAP1zwnEnLTJgw
gRUgVd6I+MxvbiN2VeYfBmNz7iUgMR5I8+9YePpVgY7gXArDrxxmmxAiMDCjlNi/jtzy0vpGYFzK6dcr

c/8giRZzIaDmlYx0p8ewvNcouHOZoXrk5MQsl29fuYS39UaXHMFJDfvan5m4f65WfPSOudGhb0wWOxH/

qO6kZ/7RB6MH5TKCCI9V8qZNs+cc18foN/VoKrR20h
07DFg6aF1rwooNAzTGkZvTNExn2m1KtXqXx0Tw/2LgkqfZbK3bjGOpF+BoSsoSZ+ps0IMiyfdH7s6kzb

SEKAdcz805sk+NvXWRVOGlQ48uSH/RJlE6zRdph+wvJpuUBcmmDa9xnpK0vfoL7Z3nNktcL8AqGmn8f1

jkiR+kTBhh0twBo6ci4xLWdQHctAGuNPaLTyk/cPR+
cvHJjGK90rF1NSfM0WdCxaTMIE/PrsZfUkSIoayx1vMUqxdncxhe3EN1H7xgKDXCLtUrpv1bEh0WmO49

76ka70f7ovMsB9JhMw8CwDN51WWHhRX7OMlLwQLaConDnw4aRMrwXs3x/wLFO+ndZ6MVyXsUYAw8ksh5

RwG83zZSdgvb1L6WLmZReZMm3BXb6XjM+nMfOQUtM3
4WNZyJLhs+6762aCCQ3UoyMEI6jDwY/WgPwL1GbLt96q6qVpZv0hbbvSNDPczmNAQAtI2uwA0XWXMer0

pfrhKG9dcN4gHxtmH6aK43na+cGYd8MJ8pHvJPfaDxAMcCqIf0d/S0mCTOTFn3YfLICACsix9BxJhvu/

J+Peo5XWpqdEgcHRNl682ssj3iKIQoj9dtuIqFWK/a
03879Ai038jGlaKFt2fyD9QnpFRXvWfHEgGUGTwli3iRqXwFqpru64E84GxwYsj0gHrnapvxSEp7udkB

K3NbJ8JOAdnRNHEuT+mXCv6TCqMlIfDqy6U2oaNE7gvhzVMwZdHLJjTB3edd9uvnmxbZagaMwREEUBMo

h0bXAaKwM2t/K8xmhDYawEuT6bsg1+6LWWk6ZzkWtv
nVuO5PNXW1ckBqE0Ge1qdKto0NOKduuz/u422ia5GOl1KZadwfPtvKLB2XpMmp/0ge9L/MhVu1GkXKcV

hgHzCMGqHXCvO239zivoPv5sffDTKMmPaaEVR4cdWGE24H2fKJSprC4NRMmnWDc4xlCgsHEELHRx1h46

oAtsKFkhcwm99xri7lpR2sOMdxSkGziGwWOczTxEKd
DpBwsGLbAgJjNsNfeTeVnPNIknWK88705mDXTQ0cVX+cATIiZ7S42MCFxo8e1mYASiTDVStU/BwBn4sW

QMtvpa6FOQdFBrw3JQcVqz7BP8YqB8sRCOW9iDSIu0cJ7fsd2O2KljGcnQAooiYu/f1LGxdj2gCJMpiI

3lAvvfqixWtz8dS4pQjm9u5j+pTJeFKsbIYb4e0qp2
BR/7vpIOogbhv49njZ2+y5Dcxha5Nh35JdkkPgQTH/0lamY6Lti6uHYZk1KS/9KTu6C7bGvnanch6x33

lZzG2CPSuaODmB/g9yO3AqyfnG/RwN1O7dC5nGh5rCUdRDO8bAqtAzCYQnQ/2Oa3w+jeYojIMZuNBZ4C

ZyLZ96FIsi6UPq7zksRJQdtLVUwKrKNde3CL1WOm26
Gtnje5TZS2hxoiubflEgNrc97ZCMoyMXV4u8kBRFGY+gJhlBTAvR+CU7Bgose7koVdf/a+AXrSDu7Gfm

OFP6VcFVqgaFwDWkbqF5jjn4CdPgn14oWqFche+kjF1FUM8sCs7RsOa5gJETHm74217ydURP/K5U0REK

u11iZd1zzAAo7yBRJBMxPZhu2q6FDHn8NART7+724b
0BmqFVUHTqBiZbFGowcCMIKtA02XJYmX9KCmPzg4SNhv4eT+t70kDIWM4Q/zwce1VlFyI0kdxTEAi3Rj

9srh80YqYqFO39iRnUvxNGTk18AI+IMwMjqK5RzJqAg5gFoIjJM7iDcBRL2FJwLTy9GnJz9vnOwSIoP/

GfVvcqDjkE96ESj+ry+xYY3+/mFUzC2TUr3+XGPxle
ntpE0H3zOB9e82TfxRpbjTgDMUIieRRd6HSCyyoEMQFdzW0hTX5YxubuLm1Tx2PLUeKM+1YKEtsyr6q8

8q10ggFLZI6TiZ42qy3eTfxRS2wrRuNV+hUKkWziX3d6HjwzWmOOtpOr4kopeoWt3gt+riThmavhIxrj

3t3pXrm7e3qaTOY8eExFQyVTf+W5RuRQ6977KAPZL3
2/MwPMx7oE/5oPKJ5RnIB1YJ6R3qLCecso41aRjSadzkjnSLGyC4tK6J5h9d2Ot3M9vPancv0tZ1ukcl

1E4i0JNwFqsPSdyTT7nHw4tZnx5/1eMpfegEddIc1SHsU/zLAzU0835+sySzTNQTiXWgC+Bd218LOApW

9mGQ7o6zOH14C0Rht+nQIfooVigWNP1b2q0kmTAcvV
Lgw81D1Lt0oekymtoVZ3LhIg+i5Xe207cAqKrl2kHB4X5Rp7Ui2CPVMnGwh9cTqqREQ4KFevzW8kcsY6

X/UMoF/14NJGHx7kj+4B+NS8EUSy/Fm3Xy+Im/7syNrdLjdhrClit+5C2d6XlR3dwCFQmE6eG5+tNY2l

zIQSG2FYDxecCoQrpi/YlDrppAHXeNPwVBoq3oMlWG
mZ8Utfs3R5AeG9DYlkEPvXI8Qd73m67D05kevN4eR2dUSGOCMlmafCSSOUy3ngUhmQBJwBTJ+4FYmogq

T/3NvLwX+SlHlI31nCdp79wHPxIWrZmIMRTO2l49a9KjYqrmTg4h6AeVkDUptLVPc88nzvnPDO+m58I2

WU2IM1xJqYb+RR3qM2ZraMPF7qy1eZe5Lqp93w6PMr
KiDH+AuGfhWPZgqzzyYjTiJUhKvuNGscuQtd5Fg7l+F3idxYtQ0ZHqaEMEBm78lSJuHAfk3sqlKsr/8D

BKbjWL1NBU3ICtbUC2ieIlSirF3qDPaUfQyU3lV5bSE8CJ8XcFr5tZ2khWsY31e+/8U/Xyid5bMCiFg/

+v837dSgls4L1nZyvsW5yYT0q7Q3vnFk1j49ogp+OZ
F8kWMqt8tgurQUtGpkNGjFjz4TSjmpY8zcjq8y5usNZ0UQG8Rdy31PFH1y6+EPxvkgh2pRWN3shcM0Pj

HO9hmHTD7NdbraKsXobzV5mYh2e4USrpTSr8eip6ouTa2XJNzOKNKcQWNy8Idc71wbnjBcdgbt6xae0n

OPK7aWTMEhOvjnRomGmMHxiPSb7n+ZOxrtyaTmnNhy
aSxW7A8oB2WJYO8hMAavp1jpj9Lmb/2nDbmDEiLL09HrAAZoDDsLnq4uLtO73K0rFOWqMWe7zKvRfmIO

GqybPfqGJR9Zu+x9ATSnB8YvWonXOAUhD0goxUg/83GZ3qQyPUhc075IIdHt1MlSPM24FMzokPmSAqp1

gXxvnzexpXkFEe73gApV73iJXOT85OUhBKUV79DNJb
NZ2DJ+Zuf2taihFRwKjreSII6XVhkMZTLQWSLgfU2kqZ1S4f319NSrihhSlE4TYABgN4b1ntE4sMKAUo

qIZTUPsOks6qp4s5mNhBNRK/JYM5TzzeTxXGtCv4kZm3PfBmmE+enD2VhXMAdtrN2tp7HHbzFbIq1I2X

QZsSxMJ2watHZgNYN0mdnkOFIaOa4cwcTjID5eY37i
XbU5Z4uzfIcoLe/xh0z+hpcJ+jfg0UzSDsIO7g3i4vP2BVyUXX/2rjlRnfOSutC+ukN343LUgsAxMmEH

aufY7gwXqL4dF0kpXx4X320/fecycps4NJNHFcdutsBChgiwa4wdlFU0urCwxuTe9rVe1alcQ7GuBArS

e3UxihkTWtORds/wsr69pO6SNlK/HOKVn3GxF0a400
pNcWl5L92uVQK2ijjYiryxBSYfhrdkkejrDVZnd+V3Wa/b1YYuVLrAE2zfVVt2ZfelCyEHm4RfLBLwoK

3Nr/3w3LNtMcmTSZPVxfmjkrWoSXj90lWkm6xJ7mt5pb0N8nHYFUNKThQ7G8y9zHUXrMM/Qli1DXRUqP

rJ5EvkB7ENCZw+HsTMf05dmo7eQRqiexxi899BIVRM
2c2FrYjAi0/0TnpKhEcqUlm083X+tHvPDgkFg0E0n65TQstqdLPaql04xe1UHa7B7PHUiGjRRO+GRNWT

mPccdhzvT0Q6Gv838f/MF6jc9MgsBB5lR+4iQpnIdSEGl2Xn0w5TLtbrLFJqDOuH7BRbiDEM21hpjEd0

t1+aX7bW4zYtdGIjq0HE+WywbBTeHd3EmKzfuwU1w2
T97IqJ+0ZIA4va3MV87Cw8ajT9fURaFMt19X/ptBRDXlPy85X832CybUNWhdM3zri7v7eS96nL07qUad

WVs5/QBbssgGVXy+4cDQYfw2jSEbQtVdlqqhg5d4ff6tw/zsdI1XrECoJfr+zI+LovZkt+FjwCs9FLA5

P8KbZJQfo7Kt0p2DHIJYp+DnqERH5kB+tFFj2JjmwY
hfYyNr0Cm4lNcvD8bVP7qw4dn01rixz2sVsg23pCnUw+4PGC5CPmhMGbLVHJBpSNlAuw+WV+3hvrcUaZ

MO9SJ/4TbyxjQxxKlGQjZ0e+jVCPopFSh8FMjze17L5X3LV8R9ATwjEaFeqhWAeDu1aM0BtdEsxU/7qs

vZgNXf9oXPsYOjpTIIKY/wpxZt757Nin//UrOS16s1
Q/YH4qpyPk4Zh41hPrmdrTTCeH16ekXlnUH/MIs6yA0Hns1Cr/2ex2oiFbFdzLFV2v1zrFXe1rgjK+Vy

RzhszQUwJe/A/oyxh+RHIXwTnE4TMFEspYmqJ5EjyQUpT5j44i4BqOjw2lA+12K6BdvVG/+aC+L1JkfO

iibYv03+hBu+eM45Cp8bFaqmg5nXIy3siuc5WQR2Nu
T77NrmoVY2mIV95Dp2Cto/g7YmhbXFivjq6hcE+ydkQ0UumVy20gDhw2n6PwWnKBGoJyQ3Ta6Q8MHsQu

rQM4Sr0Jx6PnJTUVN1iycpf5nM1+e69TCyRrgQHGiH15JwTtCcfsTdhtv1w/+dfqAkNZ7gh5F8yWkwyC

RfGwR0zvwsPvgDR6BqrqOljWMLt+QgIdsl/+imWvIT
H915jj7ryyZPiKCCAyJ+EAGhdRxEDOcj8X7yqIcDXDl5Uf+hmTQtM+XsciMToukMZpWlyLkSE4rT3Ko+

WIN5eA+vpCsDbMLul3UAWdvCa72YZ5Bi0YviE5yGpjhWyQnh/PC7A8sM4iVwgAqkb4FP/v+mjhdWKQKM

lucía/fk+FoNWINfg3cJNuPJulX5s389p5A8N4h9TCccN
0pEafgFS8Pej+hBcappyrOrIRQ2KZ36uHWfKI9V+qNxQWs0dg7Zd50gEm6uEqFq9PZXYFEWoQY+Jg4K4

zARj3FzRVv8lVf4IptcHq98UQ7j39Vtv/sVkMv1gjd+jose guadalupe/RHhtcxVO7PCpkoLyzMmQkMNeZdiPlrC6Uu

IQE0L64EFcPmL3x2YBQAEOcI7XTZT1su+h+QK+JUuT
NJ9NzE/b5hjLuaklqMactrsEos3s8fFTjXPwdNwYZvlkRmBQOt4RfdS9xiASn49DMIbfvS9Gs3LOAKJs

acrWWOOCmCsZnjG/stzPaicAGJQtHupm/SDGVNjBKy6p3cfUpkjuNrArjZruQS3JxPov7Ua7BDzFHhC2

nCZS6Ky/grVslRALSSUQf2pg3YA0AWRhChjwaZms0H
FeIvjhn5X9ZhoVeDfSoRdDNCEhDtc4DyH+T67HBu5dgMHo5WTbnz6zHVc+tcSYyasvEmy5bDbeP9GUfP

ArOFUlFQe0nNBAQKMAOJnvpvIK9yxeAb7jX17vEVOhd40KWOCAktmotp/p4LhN4oxZF+hBKpayTWYx2g

6SaFW+tALfhruXjnPQQp6F6hjgfSZoey7Fx5VwnJgK
AME97iDMfN5DN8JLWRCePi1Ss3cs6PX0MDkY4LDbzV4xvHAwkYRi0G4e+1MLrPTJBXcKYtA+nXrOLS0x

So76u8kPMYsy2dM/JntnDX127DUvMAa1ePXKSgucf2HwJL+t/kDwaA3E6GL7nv0yZgpyrfQPAkoBQcvO

zrMZ47tsn+3weEfKYN4CSKeRkJxXbMYteVfR/vSTrm
uJ/UdG/H7XKhfjTiFVkek6YE0oUeM+rLqi5+ERMXfUVHOSGmL1thQ0gWTa40bLXp5P+GQn0Vs4ADA3Oc

CsEUIaOdLuGa3Xzf70Oj9vitHwoTjGSS6fuB+ikDKlMVm0WO2k7i0mboFE0o6AXOIMKkB53uZ5lwfdqG

xdpNtRtLSo3ALNs6QWWyO0ceXzp30dxBxAjzFu9MMV
LsRFz8ReO/j1XiIsHY/f2Xie58ZacJWQxca9CPEPJnt6fUfNghLS3mjKXaJ89sI9UmS/LDTx581U5OsO

5+db6Q6t3Qw+v5d4sc9G8tveqBgrXyRlWv+UChieF10wZYsxYflabS/j98M73Mv0kD14eamj100O401b

3LwGVg5oEME1JaaxAtUaJCacGiIautsQBmESsRU+Q6
iq+OqY7LDZzrdiBc29XLvC11SmIf6HmWokZdQpoNE2+L3i9UFiBv/FtpFxc94sGmtzDMMJhOC27NWjgT

+ln41yxDZ8Mf/Rz27lol7zWh9KQ21RZ0O5LwRfK3gtE+JMbIN/rtjxGVVVXXGgY8kOjFox/8/BaGisHO

cgU0AUNeYPH2o0JMhlgYZ9MN5Iqnx+Bs0Og0QuGSOs
UZuS7zV2Qb/hOttsfsakPC1jl08OI83leuhjMSX9byMWsbcsuexwAe6nMiX5dRRWAhSwWxRFQmIEonet

UXeQ8x8S08aj2l81O5rSicDr6gSJn1m48eJDrBHm1xvvI3ia6UUVe6ieaNT0P1h4rE4kSA6bux4/1Cl6

qKrzKxHyKiMNaCroWFob0YdS7jaOJ64oEDx1y5crWO
+Ag0ko5RMdTaQqtsfB+H8dlGl0YzIh5kcX7696F7KwOr2vsWRe2LYDikjMDUKdYxXnsGp3HSkdW58j2x

YS8eHC1gaFrITZ8Oga7hrBa6pv9U74984dVn7xqMnpaFc3QSp5u/lM8QpyIJ45rJiV3b7Dx+H+hhaW6n

0OZjTbPuY55Fg8M+VeVeI9mxbtvSBFcaJyGuvclDOY
dZlb2YSHbt/T8OOgfiiZDrfPNWsSfVNM7nCe30OR/FsPsnSzXefvwJKdrmXkOOB1bHqHhJdlpSw7JnQ/

A3ZtVnQOESuPH8cg1i2Sx1CIAAv5ECMzZbMn+SP/P8wdLKG2vcGatrczM5RH9wcyCRVOItFr4CT8ylp9

Bg/P0MJO5zpKaPMk/Y45hD8L5ePghaju1deg71+V2X
GzxAsgsA9qK+ExEX6pPT5VphhLY4SR5b5cGjkaa3sPYfqG40j72YJngCOnzvwjIOnW+T+k+BATVOV/BI

pAzhQ40bUhsJuGouifUwL0w6tOm474aGx6zRz97oeZ690L7zlJThINGE/xcZVJcyJKVrJfsyK9caqgoL

b5FdvAT332vtXrVO6vc3VG6L4TOQIywReUUCpPqmPy
rC6InRkJz2tctl0ElssfQ1n/6uJeuTiBKBoHlSAkwUDTt6yPs4NmdMhUNjj+VpZSM4ghtCjGso1uvdqe

3PwOtgWiIzh91/50G+V/SsbY1DmjGN+zucoaRoz82my+7QDBQfEG/RoeESwwX1b9fGQF0P7UooXmJzlZ

hE0YZGUpKXxgSFi2p3qilHgm/zunYWsc9kTNLrowsV
4RGdSZ4a/1cbju3hBJJ2bw66R8msVxcFuu9wA3pZEJm04IeP7/Kd2BIzMGhWP5wn12qpZs6841+kfMWG

hCYWI8FPJKTyJw+iu7QkrBIy19fmbF1NqUYT7G5SnNSzQybPPOLpAcfaYZRTPBTk29zA8PJQbdpFkrsb

4Q8Zf/po7g2t2sNoRNWm+8AV+uRY3RovfliIfDkKTN
f60ykd9nrWComGIKJZ1DMhwd7xE0yZr/kkKmTpDqkepUzY3NyXUMNkHA6LMBPyrlpLrYQNLtAixRYmhd

lz9Hgqs/DCSnKOpdIbq4q772UHAn3MPtbMh1EdC110y2+VVbkQmR2c8zXewx7xRf5tEQoxGQf9CveZ9U

usXUB1Fw38vCIz+VoWF4G6gSc/Deewrb84AtLjMXGF
HvnSp1EpT2PGHXRCPROO5aCzXzQBfSKM3qDskD5yqNSVZpTVQMGDfb3J0tvuBfuW0qQXUx4LbAbtokYs

KfZ5399Jwe8bW5nR20upfoNPsewWtuWwAlroEpkdndT4052FhUpAyPl7137bXfLKRnQzzSAj1IxFXEUZ

LifOMdY4VtVVW5h7+KWBNfXKVgNHi/Wmcqvg0MLi90
B4ylHjthBdB2iqAtq/qPL8zUrd+yieAsBTa/LcO9W3thInWG/v0jSWS39L9fGOS/qXI0JialPdPC618y

lG1VXy5XQCrP5DFhg8/nM/pAX0YtHvMvjpEs31w3KryE0LToptvYUBnAYESNTKasZOF/eq0Q3xkl26HD

7ceioYsUeDj8QbPI8v5f19V53Pv8suYk/RlLFpAns5
vn98E2lKjlg1AeYdJiYlX0T13A03Tah7jUDkl4SQ04GGVKcdG3uKHwdR4BZMeiMrHK3zqkbb9gdRysum

/IqpeQfOZNguhQDQUl6cMxiW4np6qL7BuxapqT3Bhfu8ELR37HexKH+1Rdp89DFbvK8wmaIic6EcYedg

4LLt/wD6bLZy+bJvM/w2nG34eWHCmQAHf9mfkzmdnt
oNTc3CfMUjzDM4X4fUrsJo8nr8xpP9NU9UO8Saf4Xk47R0PjP0tuxS8vHvndso+cDLZufF94Il1SGEqu

TSz65KFFVIVfgTYi6jE7/CdnAR8JWK/XOeAPlQ8MDuMCAKtkAVIk8HcD6UdhX88Nw5ADLOEF35G0HvSB

DFR5nUThVEHnOnXvaxGgpiaoeBQI9d6c9V5vAzdOEV
G8mQgi6wXg4Jk5wts2ikU+wz6Hcc0xxt4MCFu3xVkASpac8uxdR/Fhjlhrz5LM8vHCv1N4WpkM66d/A/

9cvRSGeYMbCs38rpHMVUKp5pjfAhke8g7ahlCwi2kYvrN+mo6d5byVVPI7otvpyGYtyRoYUSoMJFvPO+

ejPps0OVV4Sq+KQOaST/O7x2uHs4D47tvcxB9iTUXs
tBgQZbWpjvdy0K9Y+9eIm3+sRqaX287rJxxtfMa0p31n6zMoMbtvKhoIVB5YkUZK+pwxSv68+gYGnGeR

th0N5HDw8T4XHZP8qAbLSYQVEeaqFgwHvuTnE/HRM1spHxQHEst+MC6pXZ13H+mlvk3p9N8tGmZRsFKT

p3+YJp4UIDG0+pXQXweIIxQJ+5oW6VEjVTH2TESmey
5g+qWp1Ke5YfJjbiYqWv7um0AvqU1yb/gSImz9u8X4vuXR8SaiFIQI+M321qczEant9K/6KtyTzXN5+y

NLM5we/zcny6VEgvAwr2grB3qA1GZGhUQRBzWfrbj7QIeFURgNU+oMHLePWxmAyogDCPplHb6gKMx2A/

yKoAGWvjVr5AZDiuzvx1pb8mo4QEq+5n9rR0umw44P
8tW5K6Nast4AZLKohZ5rX9bwFMMOhcjHaOIUorQRXRr72S+V/tdcBC+FSBrxNOvS62QxV+tXoLKN3McD

yCwCgzcG4US+/j0OPZ6orAohu+K+Lii6mT1cvLDbnvdMUnWBqgLT4Uik7PNr+ivP44wYS6p3cPF4a+S9

9boRHHpcNodVcvUB2r8dMn28J+oEyE1Gwh1y/YM00e
7VcYxuvi4TGwkTw4Wfmf9NVPoh66KpS422ubV0VRsAJBNP48Ul3d5iUXS4gWpsbq/wnxa+w2Vivdofkj

M+3hmC9nuWv/vHtA/Jy+d7MLCQxkN2d2bo9sNoefwFxoKXl+JblIWIiBf/sJ5r5c2Il5yBuFpI6PifM9

utH1ZKEEsgtsGYxqIw4KFTq+nsyU7LS4MEA3IxVP7D
VOnFYzLG4VBaBqa1s5iH4rF3AvhpowwT/BTIeFmetzcQ31zISuAvz9vgECtXWw9LLLFyH9BbTUpaKzJH

9kFzdojI3ovAbVC+Om7bzOrDKhCh7B1H+P8RgULfnMqRpoiBzC8jFYyr/S10S5UVdj2v3yuTbtxBxkD2

d8eshQPcp60seYfZoy343mRPWeVMmOm+3cRg2tH7Ka
o2JJyqqqKyhVBGtElBr2W6PC6Gr0Jrbn49Wy9MlmrmOC9ejKr56t/GVabU6Dgc58Bsm4r1gdhek1iXLu

mpo98UdC5+nuJ3Wyytw8TJcOQYSR/lgUMRM5JmFUAEIRiuGRG5Z148xKV9s6Sudvzr0C0B0oroXX/INy

VLtbgwja8/wMAlRp4+nj1cikkW6Ghvvj3S3Bn3+Jonas
EDjJwER3zubk4LI71uv/7fsnSd511nzgaBuWE0PEMJ68HbnGpBNWrc/wjQUvrQDi7EJ4djjvw3DMST+I

wYJGBT0REAxSgR84PFPdbuG/3pK2xq56PENLEqsQvCkYLnwo6b7jgWOI2n+gu4Xa6TuYCct4Z24Z6+sG

PwC40v8virFWR1+3PrbjxVR1c5ULwm0wKIZWnVXLSX
4LdZO382KgenPG2bYQGQJ6Xb2PGF+i22T4rJDFsVDDYE6hjOHqaVs6xUhjZ7OiNHTQ7U+SKbl4xkXM7W

34emNAz/1AuYTJ5Q3ZNoymhYxYfNRGGpltNB05oBmyb+S3du/kj8tILAxTuswTCjsEqM+9mNQDUmxKAT

FjsNbza3v7mX5TezVQTyb/DIn+mhJtVHBPKGeJZgyo
5GN3COVBq+ZlrgFUkNlDk5B35N7AJ9oU7/ktS5YrkAlCqLAozrRAvtlzWBC6F2+rHAKzRHY5pmKRf7FX

NPlNMqv+/P4AB9SUL+0M0EZWv3V3tJSwhhhAiCbiM2aDT0tLOZks7LP0EEDN9JX+iqWJM8dlaPppS5yH

XBSDBmQ8jhklE4/JgQTKVNpLzTOwzgvc0Nx7u4NLnb
QnAGqrZn8iSwctHfY4FPkSRvDnrOgZ3jl/P6SHBbYtu8hu1E3fZIZ0Vtudsv9T3GLKtmTetzVVbCfdGm

Wr4UVip2ryk9mP9xGwzTaudz8Tnj58Uhde3gAtyPpk5jpw3t9XfpXyevLdLcqtad+O/gRY/AdMsthV3n

Cer+7ESow2PK9QFKRxV4L1PwvInFg6zLfrn9S3sz5J
BX04ne6P24/HEjNw2AQf1SEsBGc/o4zF614HcOAIWVzhP9Dn2ElCzpRw3KjoyjpXA+Mn5FtezrlCmbva

osm//D+3dbncQs+JTHwnD4YV+aPAM62Col3YZ1hrFosNFmYWt+lBgO4laPbHb56qyFQu7lNws2w51VPE

TdhD/NRH6x9QKdsjIv/esHs4WYvvScI3qUdsq5N5eA
DCgcVRIfETSLwZiEpxDiS3l6T/llcRwcjpBnx2LdlKIHO544dquCFQa0fHqmtxTpfuYbirRXh1g/e0j6

ueabO7tVDCYR9AbqAUf6n3BacHPPwobWhXh3d0v5E/gdLHFL308avGyhm800JJtVX0k2YQQxpitZ4eur

g0Rtn3VBbGYmfFDUrZeHemw3XrbYam19ba6IdnCIIT
j/YJKgZwu23ip32GIFPTclO8myuMuDblDO6TSz6WfwzLL/ey09lJRfcXyMLdZw6XhM5smVEis2dDeaqb

T6puqGfz9k/VKKlvP/Xfua//8AODSmgGYoqUZh4sR9wCVu/RWbs+6yO1ike9NkvkamMnN1+yRXK9v5Oc

hNbtKjhd030b7vctMR2Un6lK8N8yOUSWrVtUUZ9auy
vNfm11sLvBPDGldl/N4YqREh0w1rditWZ52/QE3dB52CQEJbKnLLF/QOnCllAOMSom8P2heOZDZ+3GNh

my1/8cJ4new1zP/OA/PQWaN2DHZctFi8N3eiVwaCU92hte/qwLigqWSrzquVmDf3OIs70abgd5k134DI

R2nIjeSRK4tKjmlaJ6Abhw5exhUzRy/klxb5qlUgHK
IsbxrBYr63t24dGhJ+mFBxliILAfBa80n5YgMhRwxdGGnKvs8QO0UW3nCd1dv725QnVthzyjExolXtHl

agGxBSW7PO7CvGUx6pNdmfsmRXpoja4VWJxH4wXO6Jn+e7d7bfO9rnn5QvhlKZ3Wmm7hA2RyiEGy8qCV

OKMN4UfW57vwF67kGLdJv8HGFiSzQlhHpoffjztJOU
bCNd2hbx1r9fexZ5/5A4/6+oR6dgg4OvQ3CSLjS9L76DKL572h01bFotvFWcPdpBoQrMJBTM7xagFyyK

ey+zkfF/pLuPNHtZQM7IhysSiSykaYARe3nHN5elbzHFnzPwyOU8kKuKXIWK2SZIQQKLPsC0k6UgjM6y

HJ8ariQYHCdI7K26TGbIr9XMCe0IEMSyNVKZd71eir
0ckAEGlP9zqah1RzSzkJ/A+fYk7iG8v8vGCF//ayHx2+WQxAcMTLCSIKna22haDipAw6XzxHvR4vIeGn

358oHKnvOfUa9feDZ778ETUmymzaQEBi2NCD9D4OqM5X4/aEnDYKqOYGw8/A3CiBEgH/rsSTRVUuFMC6

KJaVBv0hYuDQ4E2oiCbGjfGPf97CFNFAxAGSGPgvqY
IDrBTc5vfuUN9ww8Xe1FaGdMD/mfpqv+VFoRAuqs42Cag8+T1SoTG2bZzUR/9/jTfn0NzApDsDvvensg

ckmseHm5suOswdy60qy1yaYusrq0ydex2sR4m8J5vXtVjeempu/2hQyOcJmEH5v/22jv+67tmZCiSUyO

iSa6YyZteXAbmn9hGcc93+RcWRCHSu517HY1CxcLOz
ATeP0iwEJsN8sjRNNF1Rv3lsFsm+IdjIpP++FXNxJcGpw5LDCt+dOhzr6/X0yQOO8VjsepZwIhj5D7H7

ap3vJxkImDTwD/a/Ab1c53aMViSyMCNLc2sW5PF2j8vJM4DyoZnkPFBno5hWz2fIEpehqCxtrdBErhvf

oydWJjnVOHuAnyWBJHwhvyw8AndEMUhHx5HpxZl6np
lgX0PgQ1HVL2eIM1WXKeKaCsiaul1tfC7QCHZpRBvG+Kmht360XM0sLq5y9ErfLFN5NgMHM7OWIcIalh

9DH9jMZokYRIR5UuomV1Sfs1JCTD8L+D+dwnrKwU4RQc70THt/3WTUdeoBjGptrUG6tuMdTEakeBamuy

uMrbKfRLCKzGesuZTDhZlcCJpg81fYZsSfHRT44War
HWn+OQfLdYL3IPx7jp3yaElifsRDB0Hkz7peMWNcQ1D9/pfmV/W2GZSWT8pTw9lUEvi6bhLMZBMeq2nQ

cH+BbfLZGD2PE8+ELufU2GvuvYVQkXccttiigu/ArUcjeGuN8a3EAOQvcsLd58a3QlFuzuvZwog9oxs6

elt1KMJ/sHthpXwjH1biHdiOvgPPInWoaAru1rpl/d
i6xY1oed8z4LjZKQpfHEz/xjoI38+rOvswdjmSp7mzK97kNtRwpUxDO6cJ2e33mnnJJaj0gEISG+5DbH

bFNxRLbHg0pc6cjDo+UNBd1K/Bml486b4P29Ep1uQvSl6nwGq+oEFbiCOq2gaOXRDyRhSkOFGWjV3EMP

UvFOptnXvz5kBueBSVAZspQfB3K0mpvTd8TWaaoQ+3
/XGe4Bo/mMSmQsm8J1sRnu3KSDmDOSDFYVflFNKWg7ZA9NQ+rx2Fq2gLdD2lanmepfBM13pMfuh3CMGO

YNiaiPSgSwlDG563YZICvznTMtbIc4aeDugeZye7I7XKPjiukoEKN8uzWcZhODrVAO1RyeDFxoyBpi2j

OkOv9IJ7Blw3voBiAE20vZLLTTx0ZBecaDmRbvPJ7C
bntfIXEGeOxYKGYAURD3BX/KIy1LM/8T4bBinIyhVtAbCprq8rl3R+DmwbdPIxzDL2p3MklRfkn+l/GJ

5aJdiCGXTgR0nJmVVpUaYcVn56eN5vaca41LOxGGK7pA7w6ZI4KIySe18pCOUcy4nNcZfZFCxAOYcM0w

uemHkHo/fdkOCSTcfPYHmpdRh+3UGZ5L+EPHT4bEDD
mX8UfCBJklG7BxkXFHKmcF700M0EyFqvQSKf7LZpo50Z9lh1TDuXio3EozVvd0b/z6Fakncuu6fv/av7

GpUXTRklziR04YF71XH9BiQ7IY2pGw62rIgSE6q0R0aK/jdhmu/VXt3YTgn4fSvHxVIkxqZnq5juZxSy

6dpwqZl8b+176FTSNsRX7ssS26urr1h/frCR0jTSX6
tE8uXQExXiqbyUW0g0R0Xi6f2TDb03+huu/EfmaQAj83BqQrjH/oy1NMekhpTzCatZ9lD+7ygj3z96mw

1uXHHuDenYDF6+OS4jb1+emee0WCj8YblbEPtwFXSh6m6dzjbfytmmiSoBpTsUN2k59UDzz7S8rsAa75

ypAywoZhKhsx5MsNzulKlHqlRiwuR1Izg5oXTRsrLb
26tddsL7m3EzjrhLu+TH+8C5smamjqxwxepmcbmm9X2r2TvIWegrAWmL5vTqsnBBbZfrzgwzKcuX9qB9

acfqrCgg2KfseiMbNtnw7X49SccICVeUvuig885xaEPV6XGmhvAFj0YX4x+RGEIi54BVqsViHwDhpb0Z

OFkhNAmLXjJ3335lM9B2CoKf+ASbijZGhUUFkrIVHa
83cvd6/MY/uJ8Zpua2olbEWm16S/ln37Sp4fja52gQ6aFj28tEx2B3vg+VwOT+2P/ujsgfsyjunkWmZx

1JQQgw5RfbSuDuzcczc4wIf+3h1TkWk0NJkDtJv8Ixx5jHfK4Z9MOyWhDkBc4mLtKNbxWJofGbuIC+Dv

X+5wRa/eSusQKpMsa2MIl69PKLvsTPv/aKQMfbnvk8
hdWkV9bkkYbg1PBu8uP/HzXn9lbYBfiZRx25mhwqodoobT78zR94t4Zu5GCZAsbjraeO6julJBbQHJ94

NRvJyD0tuwgxgoVm9ZVXn1vHmdWSsjThv9ZrqDp9ceip+4S2YrUn7PosQ0KrJK/Zf1h1DjUTUYaC9wZv

ZgH2uDIm4NVpYs2RFhQRZ+6oUYkAHikJdNYNLPkfaf
3ui4Ob59x0bj2QSebnsMvQS9OtlPiZSvvijITsOKSxzhZFkNL/HO0RBC7Ooq6ix5yMducy9Mr2t05J5s

GbGWOgZIGr2CMk6QYoQAFCJ53k2l9HVoisVe3CCiMwvVG6NNhWBxeDtOsMbu8i0/ZSpXrbnrOEOeJ6Dk

UDWyxoGd/08osnPD1/AHJTtjUmFzUxGMJcd2qEDdVe
sXk+LtolRVilui7E9ua6frPG0hZ7fMoFlIfc/t3qpm+DJjTqojx+ZSwKo+pKiEJ3s7QeLMvuLXNFHT6b

P4RpCIR80jtWkg6Ne7zzXoc+4Nmje/1716AEQ/irTdMvxh+/1rWPlvypUODbSB15FVXc5E5keyuWbZS4

866+cJTLLuC3JHGfmEfxAUajy4DEnaR2fVi4u+K3Nt
YRuY+pKPsKg6P4cCxbj0gPPUt82fOVxOSSKD9JtW0LvSzhTaB84W+HsdjTKDQBWsgF4FTHXU1Z3B53Rn

Q/IejYJJYR0dkiF7jdOibPHBFSTHk+sEQ+AFU5sX5HEtidinDaAcMvuOqL+Q5cU5f86zKuBiqnufkAAU

bT5fhakzpTfiVPmAJGrqq5Qu5OBtmFyZqEntsIJFu7
8fo8RShyVDpdn6wqHWD+B4kGlI9F3W4577w34BcicdSgCRDhQLR5qPev2LBvknKxzbyhExdeuosd3Png

+6E048MaxteuH009ZuWy2z2lr10HHHBmjs7neZ2Ywuz6lhsx7Tvlg5UpvbZmMyFx6NOd4u76d93kap3S

ZGMoUyZcS/81EFrw1tyWu2IFz+jWSkpRU2H8Uks2W+
B5A0kBQeGkiQEzs1AyhszbKw2rmxAav7OGLsSFge/qDb19EqPS1s9YOgQWapgbKtmRYMrQ5z/9E+Tj1S

L7b/nG5+m0uNhJYHkf9wVz6obil2rmAbqPte28Fp8BU4PzP98x+DoZgVjWqQIUOJ3xVOQVZO1DVtHYsW

W6T3+VEyfLmd/dTmdCMjLBTlgqEA9DJnv38SaKjL4Z
iIsxgjC7xXN3gcE0UCQBGr7eMgq5sfpbKknlPS6g1DiB74PX4eYxzuQp2MHLJa5lKdBouGb7QuHAjBhv

z6Aoceohl5n2ekTk78NAXitdyw3cdIYD/+Mfa9Iim2jywZO0SvQ4173wopp71TFO6SnhW0VPol3F5JdD

nZWwoG+HpvDW6zMe7W9LMGnFzTCRcIn7lp4aoX+Q6L
W/gerk1C+FocI5yrgPqFv1wrDmebOO6dwzH1JvuA9ODvEK2xa2+CdMjkw7BAq14W9gPKmYlyngnY51BD

XSMButFv3tP4vQTCaBmRZhK31X0mEZ6B1rq+ahrrKWnSU7Rl9APoNQ/rAzhWqkLvIERTmZLTJ22wAsFY

MWIUNVrEtTltcKMaQbAtdIV4BI7cpwRuPnEVr2o091
ah9ht7p2dPA+ALYT2j97/6oOiox50W8wViFHrPSfe1estFjzWq2gzwt6CZBGqcJV1N6lAfQDLK77EOnW

6wdYme/vvxvs+lYwRx8ls3MYOw0CvJJgVWVlO/Tpn22ojGkCP29kSz6OoS4MbtkmVXWaz7NvMbtPLIYo

xSo0iHdAP2iygeLjkI55luhpQwH+VIevtJn9H42+x6
a7KWCbsQ+ZJ9bZ7Z21LlHU5wlpzqhjEs4Zl3X6Gi33Eo7jSQp6UIm8rj5dwWLPSN3i+OQhv+qfZ8IQS4

MZQOcjIcU4LGJefFiBSo2eBVa/x0REQAtLkSIZc2Y/K8r2QqObMo/aZMJXwUIzwodPNQCSnx5oiZx4Ow

pjmkRIKItrt8i5KiOCVIZ3GZ+gw1BOul8Hio3huPGJ
nQrGvhvdTOougWl2khpcJam7g14SG7XCZbKR+3irfS7Pjlagc0XpVsQq196fAGQFYpTTSvlKlP43tZnd

TdO6yEJMXdvsMnNqKe5ImcKZcQSfXdTb9CAa0do8D/eWB6UYwB5j0o+2XKGTvdKbx1mjszSABO9KrqC0

wDPmgbVZArUvDDHbPbWmIS1A5H9xMOiY8vIA6qOhPD
lKM5viNXCWSRLic6t3x1PPztNg9MRMYhxa85AIecKt15rNoAa18gsu1HcTTKnLGTZrlbVqp+SDaRcIsK

I5wtgpaA3/NLSzdd4zKOJLGjv/lyWhco1Abdb3ipIoN8tCiX80cbTC99Dma0j+cUdQ1FTGy6KgXvDOaS

VIlPjVLLKs4oKk6zg6CXUDcGdgGxQL35AGk5d8UZyo
quoaXSa04IrGNhKFUIvCMm9dkP/jj6e421zfQNZXCvHof2F3ZbKQ+gT3Aq59IGdvDoFX66KsJiJH+UFs

uQqDbcFfEQyi5r/HCcA2ejYaZ2T+3WhNJL/Olk07rHbPj8dvHGPpteUZmcme6tTruwEDKvh8IdOMN8pS

SD9tOOcFQP8gVenKYoPLBrgQs/JBH70rfv7eXTj7i+
6I/NzS+VYxbYPSmffXv9THBeC6sshU1K4hJPrtwK7DAo2ffps04sP7pyUB1sWQHxNpb/IVQGY5YLIx/q

NxaWpZncFRbyOkPAViuCzOf3RyBFIxpE9Fx+c8v89X3Q6mTlUE0jYAnlaeN7AoJ4d1d3/3spwap3KUhg

2X9aBuCptSpO66j1/g+rpKSb81kaGxuxL8o8Ifb3k4
pK+4ule8HHqnKyxEXLm9QApnt99PNndy25d9r20CfIfTdlvwPULtOtv+fspRBGyH05Vf5m/k2rSNDJLE

aZddJ6uInXqOK6QpgmCZ1f44BIHGhnNOuFCV2oFFcN6aqbZcfQ9c0d8kPgr7JyyrkMrxm6zlomQakKf6

Glx+YeSHrLxB163eFMydBPovEcurjNVzSHHPEKfu6o
/vGtUtWlI8dr+zdfjnK5fkNF50bE8C23uc9AggJIeBrxjspaTEM/t53zlTfF+f287ESH5Yk3k5c57ZDt

mEWMyoLgbldzafRsjGLtxniheaI+urdJ0RIrF1iQJhHmj1MQsDzzTzv2mV9aAt5MdgyPKySjkUMhpL3V

aUinfQip7I9IGNNYgbLGKtkJpepouHfhL72RRBwYgy
GEHe1ClVws+TB0IvCR9/ucDeVDHI3Uh3wTTp6q1ad4CVODr9m1sfYQ93k3yiPaiFIgHio62sLH9EOcX5

TkO0PcIm3WKXr2kfGnvP5oOVM2brgEwwcx+FvpUyvTG8JsU56Yj3ra34qmABsKfY/647gX1CedevlZdf

F8FE82cwgh9+TpsYXRtLGcQF3o2i2EoJO1GuEjv/Iz
lWajltt8SN9IEHD5uT+9Y5NodrUkysq1axKAYpxg6qgQvJ3mPywvY/I/GoioM3hlgF8bzIDfTRxecudL

d/DLG+CNlSJgB3t12yUZQUdy0yilyUOlzL2/IiGaUt5Nc/zOd1mTxwQtQJ9W/qNpKGdHLuwVPxW6LlWM

zEE8kxpt9EOrW74GMB4/G3A1VdSXbZLH44FP0oPD4c
lpIoIf2Zd4kTbRYcUijRFd5Au6G6dsaDK5iZPHm38mD2kdUWC3g7iD6enm1HMibr3cKPmfOdhhOX8Az4

+sJHJDe6tPjeafl1mW1IW6dBV3zYn7occS/acXe8VPqrA5W1JdcMo9pGbLtVOimG+AQLJqy5plyqxhvv

DkiDmjwCiAAhd9+ksGuA4b8CTwpYJizLbhRpzRNJVi
XsIuOlHobRXHJ+nqFj9zj7kow1FVlZJ95FGEx+B1AUVtiyWeCgfITEF/Y/TcRG+zfJekSAX1qAdUZwDw

o/NtlUM7ARltGnVqzwh4uyvqwLfrAvWLuu5j7SWl1yMtoRXQb9c4AeDjhyaGam+7v8wSOqO4mGkg+XCS

nRToaEKducCN8gVZD95crFyzBW2sYeXlC4kp1lXQKQ
56GzCwrfxbavJNDTEHwUnUvmYzUEYDyoVudDO3hNbEdi1cmbeLMe4d0NKo6eE9ybMJtG+vFBwNMsQfFq

xBz165jQXLU1TKH6Os0r03tanoG6qXHv1X1WzmU22chV1/9RwiJ7Bwdc9GjDRlgm2BzJdKKbmbvGUO54

kkSgOF9T96TtKAsLynIDMoF7nWdp4QmDA+TqlsJeHg
RqdM9O6x5k9tJz9q/HUxSpso8xGXxQXsYgOHkDSi2ZQv7a3ealFPhv4jajoVkcPaR+3GvRvtQAcmv/9d

9665a+SaObfUn70I10LeejI658l57s1u6chMMQjFP+1R0URrCEjuG+ac2zNcBxKNOSibIGuHsrXfjy5H

Rppta/NpYABurRjbCzQWQ66b39P0WBigivrnzi/9Jk
n03trlvM/LigGq6n149Lo72XRkyYgx1/myEQVpF29Pw3elVNnWKgkenA0s+1HJLlY83jfgfgVs4SrP75

mfZSWQFjHchIpKxuYCDYZmXOnDbVdNIXFptUG9iJQ1VbGXrQMiNVGd1f2BewdTFSIkfRd3Yq4yuhnLsB

/L8rwir1p1ApXKrFMuB4fo+ViUJ8YrE+DnnWqlIB1J
WAcACStNlaXzW3RFnVnR8WwjeeLuhg3+4ZC0HeV84Ov9J3zGCd4639jf5DEN/y9YVYnHsW3jD4mXlKQF

dmHQl0Br0cDgG53G7iFownMtFywmGy/oceNJnvxE4ZMg/AwHlRfgh5qw7fTVFvWI7xs6wk9e9XJtC8s7

R77u/Fs4OQyLStUIZr9NOybtEU2svevOgMK8fnTgdW
4cOqh9sn18RhfxSJ/Ns4ZdK0OWyBxSmcY+SDvv8PWwM3pYYjrbEliPYSM4AXnejSPzXBWU8rAL/R5v0S

L3DKNm9GF0fmi67Cg3BDzmD0+1v8cPw2RTHxxBzdiMx/Uitj0YgF/Fl/CbdLPoTVoQBHxMKil/jxMJi0

1k0BjSegrSp4yhMTmvX2lecU22GRVwtflP8Pt1L2o6
VDjVdrVMiHxa6YqtWJZqje6YV7PrODnOWl1MdtfEQ/44qO/5RHTgu3l9dUTmnzgCd18Wl4B/wxwDEMVf

eSTmffnpNWuBguuwoVLFKy0JiWDV0ixQD3uP3b5cwkyAU9d18NjvXipNgwgzOu/qJuYLOGqYvM7jcdL9

nCd7Eiqfyy/cUPWEc5V3R/1sepCh0TgN5q2lucb+2J
6zcdOQIpqrQlpzv71W09uJe+IKsDms1Vwlwi076g1qd4aEGCnCPhPqQfu2wwgyNlIFR4py/ll9fHPSue

MorSdFYqpn7vdPYvuwms/d+/B0PqGyP/Y4l/wCjbbjiEigYKAznXUDScR6gfXdGmr1lY20anNqUnBrYv

es7TlBkYpYrx+L1H7zqHSP+2eV/1GLOMmwTdoukHMd
o4MyHk7sLW37Md/8QpEYrNGQdspV69ULFtLV2coY7Wse/wSVhDVvWNVjUZJ4kFDBnEzbln/XOONKOuej

uj/SmkxnFYEU3G3HhEWyma50+LIDe8U98CvcRreYHGMYqeMV8ji6B/DOmUpnRlQ77ESCbSoBDGd6mV3W

ZK2hTWCqjL5ovlrPDX8PD0ALsQxaVe4iAmNHbkqkqZ
r0D2/NRW/7L5D8LW6ZbLF0pXOg3ggm3Bvxw0f+VXOlz83+V4CIUxpglJCOQl9gGf7i01E8Dp+0RIzIvU

oakXfobIueXU1kAZ3xnAj9qxVYq/ZTL8Gg5NtZe29/VlNloGcCEPI7PBKiCrScOf+el2hsElMCSPO69J

bxOYU4i50JtaTtXBAwfGaSrukE2B3owWMuhz20Zn9B
4CFe/6nFz66eYAYK/xbC+p9vraEk58pLZq6A8CAvntlMPrWOoTTedXYPVZ1I7qP3j6tmqXGtCkniuyw+

EMTQppGgeOe5ojyhidHNSHpMm57swZNgZiKwn6g6fDoxg8JBqd7MyiLSKesqj5kxAgCvu481tnaz6rR7

b2GN3k+UA5g4HFsa6HDKSWtAqI78BPUBxh6H6Vou5a
HS7IQRRBf1cjcmltwOr6QWhPvVfyM8SYovJdnya+E4Nb1/LnPqpMyVG9K3RFg2XkD73X6BglPQyp7XN7

ClutKOeqf5U05bTsybYMRjRYnnMoG8TYTY1U3gSYvh4zagP+OA/2s0AYaSw28MZEn3OoEj3H+n5nzNBk

wY7vQxwL0B5PSwhj1Qy4Zf8nA4yQizwvT8ZCWlMsth
tz+aEDdGgBOdVQROjAcW4Y2i7DAWEWdm/Mmqak6SvyyDsLmf/cuUanW0N+5VN73mxLdY5s9XmYOB+401

z2mf0aH2nNzytjYd0rFUw288Am+t1JSekKrbyLVtzps2mAuDtq90s1XIY0shRf2lV+MoFsNg5IbbXQf2

trLLRunO4l6kLh2lZOqS1ARpPfklAIlR9CQ3nlsOUt
6uA1Ar6R8GELbcP+ipXompNrisEii5CNBK7PnKf0Cvkh3hJdwk4oc6+MW8hccnD84qDfAvCmrERHzEXi

NiP643pvMoln7zjdh+Acxj6SWzQW2qX9XMzsOvy82UFUfRmxV6PbeeI7I2Thq75h6wKmtvvnTTbVfEVN

7km06uVk5edHDSU20MSeJhFJwXF1+IbT5CwSb/Ie9r
hZ/gsOw22qq+oU7eZVoE9MsLbz/+mksoPf0pE1PmmksMx4i6YDhR6Qav6kbYY/BdZyVzUzN+4BDEIAXc

qGrnsTDpvkOtL2JsCwTtUh63NprDFNk8MV9YyJjE2pYNbs/6/vzUXlWT3/9twJnlGApfAfoEwESGDgxo

nqq/wRXkJTjE/uAHOacHdWtcuT2XqfQVeWnHENNytc
+9bqxiiINKOP25rXc/+mVet/hYZyntUWLyXolnN5N+mcNHcQGstOPO2nWZpRIz+ooO6yRs7UKRLf+UHn

6SJdCP6m/OM0y50alXWlrtcmVAUkE3AFBIzJOsA3/+uaQ6CLxjma3vcjFuvx5oX6DxfagvfWFWfir18Z

AdwqupQaVGeTnisz4d7LZQAwTpJYZjXDLebFFJVci3
6c2REH4lh3823ogIYSbJxnMzd9rqmWW6aGDamsQuKiadn9xjYudrbcMe3fGiD8jq4BcrWqBehd5DgeLY

bKisqwsOWoiRETN62vb5NShK5OmKGUrgz74WLF42R/90uLWj7w5HNCWPtgiogPoos0pkoo9wgLWcIHSo

OQOztdm2a4lJ1gmAF0mdQVEoWZWxsz05c8q0swccB/
LjYzfPEyC1+pbyH8+s028nvVgFNgQVK0i+RXxDrwpYuibMyqkOUur9vuPokyGJ1lPzl5XImKQwwzTRe7

vczV/sl7/W6PBNhZ5L6BOr/6K7oY9t2gv27E9owk0quqOXhdZdTCE/g2ZEGE9tn3eiimog7WKPcLOWcR

um91hxHE+A9q2d3MyPKdBYpQEzHVL2hvs2l9coRf3x
/GXjaohA+hSQzKkS/FL+KpYijXM40efbA2d4C4f/lnLThU8Y9rcjfKITc/g9T2xwoe6ESF/g0Bpi6JUD

mCtPhnlrWxfHccOGzzMTLRe15jkkeR/1785mMOkRPjBpTeh+RhDeANgS63KZwLLyO5f8TrE5ZQW+TFMm

rplJxrn7zT3XS4B3e8Jvc9fO1fsRlamlR8+6XYylxR
dC+CyAKheel6Y0Vp7xeAUtxQikendaiq9bZCeDIysvagz1ggO24h924kR4Qt5I1Em7cxUkwVTu7nLVsL

VSKWK4ELRfs3eXO4VRWop/y5ACSkj8l9rT0KjdM1301CZxd/qz22Pxqc+1Glx/ArD1T74LA0w1CiWUJr

uU3nwacdwS0ns93XH+oyn13NUPVNVUd3/6bn6A/wZN
c8/XgCPsXDIgJ2JT0PuvUzXfeBmORYv1qCgSHPGULyYsn+CWI875U0Rp0TYTZX2vldv9NCbZPdp9iY60

4hgSuVNuQOrvloxGRo6/naYxgBKV8nhejH7TU2J2CSHwY08XXpbDu78UgkkZ/3nqJS51bA1Se26eosou

WVVlg8sZf1pIRca3uLdB7AX/xwKswiOD0g6Pvbbju5
n/MPJTjbAMBz5DbCRZ8XhMPyl9df6RUizSLFfQPua8MOnpg6g/6QxtjrhYZYlUhtmy5t29T4y+CDFuqF

bdBG8UkmOBPGBgyeIadUPHaVC0c9bjyRBjvEyZGh2ZUUVGsTbfvv+ZvtuLm9bALLGCx5kS2FP96zV5jN

9RPlBv5axS4NmtxmIiUlBZNqmpzOFsk/A1E1ZYgfzQ
AcTKUcnoNwagoM4eaaHF07Fr6DtV8rr8rdwgJIMxfNmqxQCLEqhCkHIER6LDQKDgLcGE6KIpjpip7raw

KGcN5Xv2jnHNeOFn9b58sNS8eO8jOn02Ctv4W637JwUA7lsaG0v1tJNG1GWTvoGCok38bkTIn1dNOcDt

ukRZW7QKoPVXcji+8c/YZ8+PwjAdvoMu/xJ5/8lNGW
DCHOjBBBCutz3+b8Q46ORqm5EQkgNzRlTvepdvjpUTuzowxVjMxzFeXuV6N3q4cIX6luZXiMVtBZg62J

DllB/uX4NcuiUf20AKYLGTRwInub7VZ6nTK968cVIL7muGbJFLwjLDbbhcNzS8z26PF6dpCE44lRkwXc

EFw04fhhcawTc3BYhB6ecGoPdLxaZausLQ5zX4hN5K
4xjkX4fVMJtQT0WPAKkBOiMlxgc6onNv5OfUjTipwh4oUzV1Yn6xEJf3q/E/cbe3sGcoPgBdxPxgx9ns

SGlbCKJLcwLWm9UKtGcg3QvQ1j8rW0JhBeR+Jnc/HizSWuLbB7v2Gpf5IC1QfmhDAw8+UXOAXqI2RxjO

gABgonE5gF/wqmkE1ttMtZkliZUuHM0ugiEX5WXgCF
3oD4ZLiDh/Il/9rhlL/yRunY+ZA6oYE5uULXY+rIPNhtQ34XQ9GM8xsmLMxEvEINXO4Pjbjb3S0kcQcY

FEZqkw2hdOp1mCGjayuKwYJpR9m7mtnhx7W36Hpy2G2uHDnm0vhqKA9MTmNTh6n7PowlKIcSIKdvAGXc

3HiJZjAVCWwJGyWvoSWwO//4v1+vG9nX+nJb/huJbm
oa1R+2rKhDt07sI1H/dYGpnKqmD0RF/CS6Zcz0+1mb5aDyVPMEInvfrSdqTFtvJcQLGpDXMArbsNXv1/

CQ+B3R1aNF+3HR5CKZDvRGB+bzJe9M9es9b/3NVQMrOZzAj0PPOv1uqnurSmezgc7BAkGN8pQUeIceCt

7TGEs2u/O3nPoYEyZwo0ur5oGYh3DsVfTBOIpk+bMi
JkC8/EVQQvXtaBe4kykRo+OT3fIeaP6p39fM/vSAp6WcmquC1C+IybLJXHHMk1s7fNqD3ydoJyISUSAT

kWYV051f26U2gww2FS0n4NG0sTrXqDySmJipbs1Nm0xtZUFmOrXasGueJqXz+ljOz6t3U2qLQ4GogP6x

idoao2oKNKHDuFCvvz1gN4EbnJ9IbQOlkwbb3xPgDz
0l/8Mhbcg9R72Lx3q2hyTLnoEvPAdplAKXOtmmaXqk5CyfBToqOaHqtV3IId87rAZWLxJRuA87fAXGpT

rlNpUf5/MRFeWK6fgYBgKBIuAonR8kwQLxW6ON9bw99UhzQ/azbptR3b6X2dGj4+Co1xz8tExHY3uO4h

rsn1xheE009T9khxZq0WBl+cJ8A/oSa4VvCffxCHK6
9w5wpbozSqT46Q+fv01OlCENLPCeMr60T2hIy7dMfnC3Mxtut9lQvtAePf8FCRFIBFIHgXZRw3iwc2Lb

zFJGq4xN72Pa6xesZ+KPWVxw6zmRtlGlZhNluKD21/JxV+nmxDOF6v/HbSfEPog0y5qf8Ly63AL6+GBf

+up1PWO+u7WeOSRY6sGTwG4ozz3NN8Ywg38jok1lZ8
x/O3dbLsy1ffApBWBnc19rGJornc2fmfc8XPI++onJWxw6LhRnf+ok/6ZTRROKseYQ5rHdGAC9OYUzOf

8+HdNn3GM+mPJOTM8Ho1YkCDlt2qx9ianGYek0yv/ikkoHh9Q+0Erb+RxywFlXNFqyLA/wkwGFjJJaW2

C3Arw3dr32rzpvwWETskJECoXev/D8fMgyEb2zuBWh
g6oq0XnNhkYRfXs/1SM06qixp2CLXIGVd5ppMdGBg6cwhwaaMRK45X9R+OivgsivNC+MINMf49zIj968

ss2CUtHAoED/8NPzYCx/D6Ipbh+sG6PhlPvbx9CojcMr32CIZEXOoD7Wn0azUk3xRL+kX180BA26cr2v

kIHszmZjlluGvHwfSo4szgLuQa83ooRZ5NprKvcHsa
vDNXI49cXDp7EZ4ux6DvwqjZgoXFY+Zr67fzqeSeLhBT36zj5IreOtOR0+9Q5ZSA4brenXoo21npcaQX

bijVSrh1uB9JewgmTIyhnMi/XjocpIprO9uZr0I5Niz8K2iyeosaFcH/jwefOWueWqb8YDxR2x4Jwbxn

6UpCwkNCyl0gHyVxM0B+/0liGgQX2drmlK6CGNZ98L
vjcvJb9DytV3sSR4o1+CZu25SdqZAnhBJ/7ocRLUssSoLNBytYgvBnQ5PAF36/6XwyUviNE6VhB6jw8A

NagcfV43UpCvDGIdHCbDqU7ko+7D+Z/rcArSXrq3TUIhdkRjl6wVvT/zQ4Sn+C4hfqz4Q8bHRQ2SnPnA

gdAA1CNx+mPpuiEEMch7iGh3W6RaS/5RcJp3qlR217
c8Xt1zXOPZMU2NklLQBUUpPLJ7mxGSWOus1NzV08w4d8zdo1ZyZ8DCRpGorkjGbGkZniewZF+NdrG+VZ

8Il45qSeJN1eAfeMFU2O7jFfb66Ib4ohDkgIBPfC2n2cmpXfk/si6iX9KHxLzfHEBMTcdyq51bSxbA0x

Sp61i2eeykIJ2SMcIjc3oFIvYgcAPvQQ7yKl5yqA1b
AJUBHZsz5hmwz7FRTnpSiPmExUFrehqMF+bIQDb2zYVbiVIylLjmbbON/rE2KFqtVlS01l9CG4xWEcgT

3I+Oy7NoqyDc6NjwhlgzeCHIhZaPW/wIhc6cJpa+wlRLwH+Simon/M5j1pz6Q43s9GFM0nEKxgK4PPV6Li

MI9FuIOJXJ2A4PFkPoz9kFYWAv2sAOjIMqQNwjYPoN
NjBoN0s4duie5nZkp6LjFq3fxhFeFWt7SrGyCzfnq+3zIltb+HHbeAKN8dosciFw/i8MFRZXqb0aZcOu

1JhL0dI9Ufi0cnBLP4cv8TprMWRX/RAwOr6yio13FDTSGx3bWKRmTJJLR5B1D5zxjgoCYWjMahHXlMd2

iA4KcMX4a9j2zSBduAm+F9wo7om37OF+DMtUDP2txW
8Dn2O1z106BThg7EM+OYjOeUDWE1kQGlC45QEC4X1n/ad5uxRq6GfKMbcnC6/omNKy7a8pRYi5HaFcLA

2MN6vr/BcZf+ab9f5tfmhGM2D11SoRDS7IL/0VOexUwR2+R3o+6aLqVQuGoZBzOrJ0PEkKSP7Tu93z5/

1VcxinIAvvI4HdjhRB82gR1q92HNnrifHR3rCgoYOG
ouUC6wrkTIKtpTHn6dGJ2LugrWoEKOIWoHTkdaGcvZAH5gP07yu9M72XuPUL50LIlCAAC/c9fqqYq1OO

+8gAcmUv4uWCU57CKlS9E14HXpnq4UpNlG70tS6fH9enOPdIRr8kKPWqz3haeR8Cpkg0lYKcy2u8piCV

TGSCbCYM942E90L8j3bJ2bxIoWgq9wa+4peq2zRtvw
RXyMdiSrGGf6objKyr9K8o+UtEluql5yUSgvXmkJ1PidcEwffMepXmkbgd+rLTgXxZO14EDS+80aRAvk

FCrXTkJa876at/r8uf/WeKDR8wJ44poNVU+YNbb7oV6HzAx6iICIZ4SsFnatdV54HQ0KsIkyYtVLt3D6

4zWIJF3C0jEz7989Wv4SSC45G92o2XkcwEVH955P+K
Tpfdjr+N+YXvva51CBL1JtquKhO1wrsFTIqCdfg+fuFHuAJaAoQn42ZdFte31njze8PCkpIllrA3/lqs

vd4vp9mtlxOzEoKN309KT+PHiYp/Cwb0u1vEWicwQTwkhYFw4wiePN1CwXOvM+I6pZbDtF1weeEjsJCg

8zREnW3+tt+25bW3HVL4nxlTmkmbmscPFbCu1D5vHW
/LjcflufaDVCuRafbrvryOEqaEu1aT1JKENGCchLdSdzU7/2dfY+/1Mp/6qyHTRNzfLO9crr5D5FU4bA

/B5UkyGQ8E4vrtVbt/nPTdTMn+vbkCUFU9OTuAhYgzc/fUqMob9l1q1f/JeQwzsUtd8CDvT1/v3WCfv4

R4E8wgeZ21257D62zgE/mxCa+p4uWIiChp2KLHa+6e
+sijmPVBJ3o1tVOIEBn92atvYgACHkWthO6wJ7KXXo/CMOwhcDTAY5enM82yxOMlnAIEvh4Q2XUtqlhm

rzh35342MWYwHe5+ohMM7VNtkku2K4IpgfR55YXI/Q61X1kJUbm8RaQqZL2IQFOZxCzfBRXVtoKWt1+o

loF13JRLxBTSPU0IIPuir5IW7r8xcGc/yd99VeFS9Z
GSgKuIaGrfR298rvmFLb9noU4+0w4Gn1Y62RAxgDv5Z4LfqTmB3jNUSWCmrkn+OKnqWN/n/XJGv05sHk

Mthg3IMxAiBrhkVlu7tRuPB/iI/qw0gPa2zP+Kz/nCyHbM1ajnA5TiZRRHPSGtIY/cGl7RvfjO+YzKDw

OxWxpR8PxvI4VEsRcnVa60ryKp1RnksvyWfFH7h4Wr
hFqKHiHPlm/jD1RW2T/emt2jtRKewrj7M/T836gO6oJujyM5YzAEqAXJN8odm+TDj7fS1qqfeNohm3fN

TD/c4S7gkCfatWy9fT5eFb3aZl1GrZ3x2BsoQTRMwY7YAWq8L9YhGv04JODHvc+jxqwSqm10AFp/AdAw

uJbXUNxr4S54iOcNjMd9kVw+o3899QwcXA/FmCrC6c
dsOjFh1RaOARtrjpAHqqIvqiS3XpXETWh1jmDNRdBKN+BYDGiVee/x2+iu/yAhbGGYVddZdCPq9bJNTD

ZlEmYzFrIDujC//DJoRYzcB3aSeGcp5Vaa44qgsm5fBbyGUucfas7OR4xoMvE3B+HSzb4l/qcSWn5Yzd

Ca9QTJJECHAsVpK0Qg/Wm30qF8+BehYiQVM/XsR3L+
8UbFJIpRcHdIMM8Dk19uwX2V3SQe/j00D5VXWhqtXnbtbzACcJAYjH/XR2IkcGd+UBlso7VVxA1pw8oe

/y0DsNoePKECgiLCKNbOA04hAbJvpNEitFeadnUYwRULZrHw9Tes5Bhc09rJJv8VKb34mO/PetEUd8YX

wG9CwMyjf/iRcJL2D25hY5Qb87heSY+9MZqtJwZMnu
Hd7lhDzwm56n8dHThaAfNTj9dC15tFSB1yNUO54toTAmHuKE+XpiZiNW479NfSgWIFgiYluAsxqsDAQy

7Dl+V5f8arfrPWxJQX10aPmquyciG9duOK6eemjO7Yw6SaFh4LhigKMAQmFUMKWt6GX31jX8t683dNYQ

P8DpB62pdtgc7lInlZmVg/P236b7hQSJ4kQaFTL6ON
bg8YpXtKSFShDVpo+2bpQhBhDcQv+i5AxxDddPyXzj/wKW0m3hIpYg7FsvGnm3gcjKR+51wsLJyL5H26

ufGYJg/V9979Pp4r3dLWYEGNY8scocSfcF955w8ruaQioZBADyw5GadgvDToFRv9fchN94oDWbv5YOaU

Q0hghAunxrV29A0XJw097pSm8SDKyGx/lqqAN1Q7fW
SprGn6dYQy85EFpbeyp87oLV6YnPvGXTyiVx4lsJXccQfHOJtv6qnXjEHu2/wAbmWOYItvYrJK/85cTC

HOD4vuc+pF4zm2v3wRJmTB4WhD7VNXJ0wr2+Hy3ZOsiTSKdcbd43n6dONPxs78LaRJQ9E/n48+HG/eDZ

F+J0cvBYX/rfbUGTyBxx/82l3TM8uSFZuz9cy2Wg6d
6KYqmUBBpVD+0hMcsGcsN7ZvSIgPEDUsWzS+5WAx+qhvorzzNOaMDPxFEfh8tri+amdtM72bwCK9gGJ1

jFfiGUGg2jjuB8/ifhurA1EoMWXPPzS4POhKBCeFQBnWKyXXSL3UlZDuLAG/45SM1smXJFcTVAdL/[place]

mSwu1hW5n29BPq24gcbjZH04Au9PjQfRZVZcolxx1e
mnzPkDju2EJ2QxuQPvlbEnMpIRzrf34Vk2SunziWQOWcms1CsEcR/XVKaCM2tIhN1uXb5vkAA8xb+/HY

aFjx/NyWA6yYL1A0AvWQQbrQM504+LkdgoygQ5q54mtUDEpyTVYcsWkjIp4qynaORje9pLhC+r4l4tAQ

B1xmshoicn0jrY6vBd0ksdKTVJ9fu6NxRwAbQ7vCkJ
iI9m6Yt81vDUypr007x8OHFhhOoz7Cj5D1K9/fc81xw0wGfgu278GjTbbSroZP4Ei5W7lZcJ6Dp+TuU7

g8tPvBEJh6wq9xMzkG/rhb7hL2/N07ProIWrl5ovlKiXAr8L5HHzZ8G8qNL6AsotxrYpr6/0LHzf0A1K

IOAZVSANYPNfYgH/FQ1s/8DJQC5yhYLXi6K37eGlNq
7Sw1M7XtRgrRkO/879m2NICK1D2qeCNiDIV/GGA6m/eKhYRcHnS5RjkxtM2l+ZtYJWOD72No42D+LNAx

5TgzPazFVRrBc92fLDuUeqIslD9EUjjqB8b4xgPU8NJ8RqFtnOmpUnHZM++4cF3LIKVj7vHweGI98zv9

LN86J6AEyUPRmT28WEukLM3LjxFxaiowZMRMWVH/zL
2WeimiKZSP412lRdUDSoWue91TfDMtQyVRlYzI/xYkVzMC9jr0ZcPptcgMI0/0dQNXjHXHH40aoTD1Fr

bq3adcKQyhk6l9GFbkQ+vrQL8cWZiW25i04+fDkHIvOJIbP7kafa53gQiOGyxDfBJ7otD/P+IfdjoaHJ

FNor7ogBxlRps6GP/+vJGN8cf9B3VcTSJNd/5AxL14
xhCys4zVjA65yrNFPX6IV4rZHds3aB45ohGXKUqyI8IgGWO4hVPdNsB5eTHttkBK0xyBGGqkEW9qtAj+

VcrED8FLzltcWiQhZhboTmKo5ow61PdsIYvyxzhseFTk60guMtUnwAbcihAwwePhlmS//Qf4OgvlYQQe

FVBeH3GN5JtwoHicSYyosCgLtsHKBmr6cjQ0x3rRem
is63oZu3qmmusEtaw+OS8WeilO0vQKQ+mDEsBoN6O6Ns5Ym5dN/rztO8NTb1oNY2bEmbwiyC+5MFa49b

STfzYu+/bbOesrkBy9LLZ7RLeYrTSDZU3sNTejVnnjYhqpkNP5LfjGnB9NGTR04VERLB9Tcadi6tgu9/

lFfBvOTd8d/6P2acJ0sem0N5NdafgKPI9UQEdtdWnt
q6O0Q4fKSTo7uAnPhKNfTBqHuFQzXvIfcFIgpe1Ax7axEE52gOZIdO3J+WFveFEPiT10LyG6FS5fTcL+

5pC0ekcDVEI+cfe5BwauJ85fpsKBqDa5M8XC1N93SxuSU1g9UQ92bh/hheSrP0DFop4w9JqZYeu7p2KR

BPswFLXdB7+HMED4cY5el0rpP87ns7/UbVsEGmPXly
78QVRmnItT27MM5TkJwFqkSfgrLRnMhlxJXu8A+3TjD/AltQAWg87I7EU6/43aP75xrUtlrGyh/4fxJT

Rnqe/BUPTw87f9VazueJarsq2HKRv7BNwLA/iCe6pXxadfB/c73RkgKFUCNFy2sj40tJpOT6x3fsRKul

hcXjU7jiUNYQUDVMKu+wcHy1Ble+I4W3/LoCuTZMw5
6ZEHidYHn2fTyKrlwCqu7epLCpgf8j6AjGe1r1dwHMxMohhJE0fhggF1JuLU4qJ/NkvLwpHjP1JTo8X7

BU+2tFfnSStBOxqyWEdWPtcW38QKwt3Og8X+b4gVOuovTvl+qe5OqB4XyFEi9JdgQm+imP8G9yYeMYJy

YGKnKCGpOMXWVjcLczCAEHkkh7l6l9ZfNrBcEdk+/b
ivfzK0zZI8UJrYbAQbp0HJG6UpP1i5uEoN+elYM9ibjjq5LwwH/W5INEdx5u3nR+6BFBEZ44I+lwlpBU

NPmkkFg0/VsD+42OmYFfyXwYTLG0b61OW+pgVhbxm5WKm70yJcbMA5y//GR2ld5aQaEGpQgA4gcndi0o

/m7nvyhmudhn22NCvyeuu8BKkjiSWRleiHy5uemnKI
T/gdBO6F5bwOopmB78VcVRrF+Y/DoNIlEgdtN4N5Twd++mfwF2qkyvtuLHxj0L/ae1PHVpLOqmFBZeaw

rUYvcQkbctufTiq41gy9JHf0VlGbdVg2NSa1AhhRxVRayXoYiAiTwkneGBwWvfenpwF5CuWpuu/5o8Ui

0CfS6JXxvJl25qoNNTkUEokf1MjM9AHufZ5x9ftdYh
9rIDkGKczbh4r1SumYLfmo+c2yBhX6XSKjgImsrrBzuLq+rO2+m94e8IKgkAN6ot0foehX3tDqfKU7n/

ABWilfIf1OZulp2czeQz9DUHFH+ITKpfbDCluCKIc1qlHWj5FfVejuYMNPQ2Eaf0ggZhUqdpOUZN8E80

iXs6Y5WFk8kAIcJWRmujXBlEWI7C5mljwyigc6efFB
XSfBWqKhG8o3kmUf1Dfxz4LIcWUHSoDx2uhGgJWWK90uQVBkW/odBb1LVFaCpxtT/35+9Xu8VAwhpJIc

pZeOT+Xd/9VUy25OQlS/zItU95564HNPnLQOedR5XXtuRm98v0r6hqNG3q4+mAYA3ByMcMtPH/GsIrOv

PZ9tqDrs2TZF3aNfE1HgZjyHrvnifRboPVpFESXgM6
y0tC83ywkVWK/puVbrttaSa/RTsh3scYtyVOTYDpHLdeUONJRYaEaYSTzj4QXV7qgLHk4cdaU+QofVvh

KfXz7SHHm21QT0EeKtFcFXfavf0DGx9jxR6IaihPxVU3hE7A8PtGDe1+M9aG1NwagERLW1XTNnnCRiMy

r7rO0v0A3OvjiwfDWMEogz7VXq/QHQchdnFnruwdy9
3Ah8XvnzazBaec4KuE3YH0ojMEO3MRNFjAXIp+0HY7jG3LXE0KPiDY6L3O0w2yNNm9AqYFL0FbDI7uDh

eQtkr/1ldAISQOH1bn72JMe5fVr7WnxaGNm5jf9XvCz+G/srx1jiZwA1Sn6Gr06Rd9Si4P9gy1mrNnjK

LwsZIbrTghbAXogrp2ruf50SZA7LOXDo3hgCfhf+dT
uGHYm0L0ujaFrrQH+m5V7Y2ScTuVjwi1yPi2MgIQiB0FOlzrpYD/3mx1RJ6HXcr+5E651EDLkEgpalMK

+C1azy2C3pktC0TUPbRtLhyzRAF86+htcSDb9UUOWoRGADuNkGiQd4i4y96aF9vOoxeyigNxXKQA3wqu

TJI2Os0FTh6zI4C856Aa16JYpH996RndOLJ+vcGGAP
0rzomHq7wyvT6FMvbJYLh29ydcWb9fGongYH3xttEfRqoTp+NU73eRia6U9cGtegR//6wDUXa8IjetJK

W/+CuIl0OGxDLSPAFrPWkdRJR6QFuY1NlhzdAH2UQX34faTGa5Ax6DE42IWaDzotpKIJASmPn9LejUeP

9QCl0Ccs1kLDMHSJIJGzduMnZu/jtw0ZMlb+eLsNbe
9vCzUit+JsMxVs+IQxahSlOA4xF9rkj5b4Id4CWMlvLx3h18W3kR0EH5PAucpO3S/nEgq0rmpgH6jHrb

wQV71enLpExzjBG/Loy5yz6mhaEzBP9SAlbEWrrMbT/71K3LO6Gm6JQTVmfUjiJW5EkrbNllBc4okNyO

rSm2bBZQDUklORr7JNOApbdA6S1aFzhgHhuw/lq66O
0J65Q7btSHQ4ul+GV/XYqIOQNPo8ruOa6cfgpkE/yt0pmiptEKyHUM5bqy14NM+yyE9Lkgs33yzxU0Sg

MpZw3svlFKwQUgtcNem2ndUhUMSXpIcC1iOIIIas0rmvCEZusX1e06Xq52/QWNSjzmTDpn6Px+oiPqnB

vQvGYkd7Ub7yhFeoaByHTD3913GM+b+NRIjsTAbb2a
H/jfqwH3DU3zGLzN+dG3VhqEC076UQuOMmX+h5dojSYTxcDKrRE85GwbNuSAIymGEoIc5GDmkgH2Y1/5

GzF1j5dGQGW4v3WiAzIoNyQd0d2LeMz5pyVOGOkYjGk7buXFIHsb4GhIFJYISlVumVzNZVOldhEVmUqr

YWDF40qsj+6eDYFqxEiMoNHgc2KAjySJuJUuzICJ8f
et/P91gOBBthflKm+fqSVqQ6MCxIefvI/3r/bKutttXxZAoLDtb5C+ioKPVa2dAqFojaNnciGGx5WZKE

eWhWgZOLARX0PsDrgFAMlfygboKJRZLCDpydDbQHFWehqHTahFjtMWurxwsgYBnEyRVBuygtzpHIZAMG

JdJvgfPPT2EyaKgbVEVo3e1HP7xvYUX+AdR85xZQDl
Qn4aREZcbfKp1t6jE5dWYmHhsOm6BOzH+eF1TRAg5wvWjnIh/ZoeIyTcxq6sGmiA14zI+l5ZP9af+sqO

bSkW9Hvy0UfVEUrU6UR9FlpDKuLGoqfZX3dbW42ney6TXGp6NNtn7cdq7RolgZAcDyNtlH2X4CQezHbC

lG0zATdBo95feVuzy8XZtnMNwxikBzo8BdwCp9yz96
zCIZk2d4rOWLt3Xd4/ByfxLsfESTU8vhESzcrwKC84qLm+2KzK+h31F4HD4o3g13FbCabAxLS+M4S+G3

2T3tmi4lD3yCNxLfturI86rWV7U2eS4Zb72YMVLLOELhxew93x5IWpxy/6WYkFVKb+UIx9iZpC+vy4U6

qora223Rmw69ru4DETZa8nkZWE+ATguHdXHkzBdLhn
XLJJR6y1yjc07t4/7Ebg96hZXqZ31c/aTwDCJwDgwxHKg+t0LbOzJQDqKTr7g9zsVPqIl8xiikpxkjob

TXwlGtSCGkxwWxbNVqvOVmw9Zy6qLbgR/rGD7TmL7qBkejlr6ToLaqqmTsuNXYWJ0mvpOjCHXdXpG/ld

tLXrN6PSuQ8B/y2VQ1PiRNzIBmlLCoZcTKFxjoORb6
X8tn6l0pnfDANlaJ5iZfpJWC+ZyQo8iPoY682QNAFVBir5f8rhSRkwZT3a5723U1qn002IML3WN0NTQ0

qPOhd0/zLhdxvtScVLWv22Pm1T/R22HYNGdSSPDKISmyoSK2oPyZLGx7g0uopCojQnUaVyMeO9F6g6Gj

4gnp8n+JccSXDp/V3F9ORB5ka1ncYVRIRK04/J7uNd
6u1rKAaRFHOT+i5c4dKB+buSFBHDUTcA4oSSjUbJPpVhYBtq55jQu9dpGKFir6lx7FS3awmpqWpZ2bvh

VfPvytWVMMIJQkhQMQReFm05n9IInERN0pDTI0X2/N2+cuZhf3bDGrU0Z+qXhKKVU0d5OlGjq/zS1/1K

bva1BicodIcMqMjOb2kaSZUwgaGswlbZVyJg53j9xo
QOErxrWCxlKvaGnW1+U8Oi+nYHG1AO3kotytQBWNlrasAAB6hlxQ8ykFk5O6s/atxoqrNheu/NA9OiIh

2DKvQK3106QI69xurQIOuaiz1hUPzX3OV+dZD2+syZFXNqirYvjoyC9JEUIujxs7cn7mWRhZGjBgMWdZ

zUtZL8hUQWFegTxTtxc+JL6cQ7K3uM+c/qWuQ17vln
8iS0E4kffqoPXRN9Tqpry416UfXQf4EbfK+7qfNz/x1p/2vGGQOS8/morTtklNAM5X8goOBhF0NmYngk

k/ll7Q8y5vvwfuHITZSdLlS1mJjqmsEOHQ/y9lSmnkv6zLVSki/dsSL7OHmIwcOTpyuxmIgQw+VEZe/r

078nAvyBRda8kpADVT1uIlQ9dQCYral7466fIkrZji
/tMlYwCaZHEJsJFH4daAE/e+XLac0b2975tYG8/ajQXUac79uQ9ttawJxS4kMB4DVBkkAQk3GrKvkLXg

kqdmHYJhEiugbKiKFuk0/TvtRswsLlEwCOCDnfdmHn+ekXlr2pvlN2meliVwHSvAjywSn5YXOxkD5V5z

rUf5DJI+1pXRXM7UIftJaIJykAU+mzPJj9+PNe2kQL
+vJlfr/pYLe2vEHoQ92yNjGSA5Y1yltIdJe1QcOkUkZxHiPTGoboYPtuvnvPKOhZRyuwKqQO7Tj4XYl0

AiQEKcMO/gGALcy2JS/nf4NPes4gjkZRxxDwXEDcOYwbQab0vcPDcxEN6QAr7Pl0LvgpICXDDvAYLRfv

/ib90bzTHWeg2ZjeG6n/kvModL+cWAJhd4EGGS++jz
ofiuBvFdPC+3VQzHGu+gSgGUR+GQo7FLwhYlpCCWlUskGH4sUDfU342LqAinl8SAsMe0+oLD2tN6Ilx7

tYVD1O1NKbUegY7lN0ybSZL2USayIlXQ95agEfm2xFyaM5pXECRKWrEmqnm5/BWYhnJzhFfEmKvcTPc4

wq3sE0Ig63+H5yuCgzy9m+gBeKIOlPstjXy0qn4Ror
lRHZXf24vjB/bGe67fwBmpqYE9LB3r+YLy3icj6Nf4R9d7DE8SZ82o/F63i5yAf9rcEe9EcMO1qsJ2uv

fChRR9J3ddXUkBY+vFEwGFfNtWURuNzgt1tI8AvkNjXO6M6QCdmUwQMHL3pHAgzZ1YBQwm/2XhX0R797

Q/pXzSSkmEJ20mchXuYhxAzdgDWJT0NuwqmNPnlm4K
etDcL9qtMy97gh/fT8hkWny7fH2IT4tMsCkuuLd7ON0sr7uBC/81wagDbDu3TDWROOIY036Mf2gh3uQB

gESSkeWb1ilWLfjW1UNz0bpgUcsiJpmy6e//WeEpg2PmyMmtSf4I26qkLBJImRqB0xln56GYaepQQ4r/

HG1RDoSkqWbKQYQwOFr4MgAHKzhdHZpAdBRVkeuI1o
/ozsIDI5FTlmM4g9bq5Fm1a63bF5fK3zI4t/fRWwmhf1Yh706SL6X4Pa5SaT9RqNAojj9Ex8+qhzI3rl

5vBp+bR7Oo2B90pN0tLN7brNKw2KjxPF1CTgoUKKzeT8YBiWw8JGOHudc/asVQ+L1nqA5jviQ1HaIfkS

imswRaXuqVYnebZrSmgbh8oIWxc68dcaD68ZigjTrF
j6PLMN5a6aRrEeHBQgCTP1bMuVcZPB87HtSDEF1EeKcK4H2RKm0UE9Hki89gRuMqPnoCkfXhN3Q39R2j

VLKHeVSbCISMviMGE4aJDbL0IlSrUoZvjdQjVDbgjrxdfm+36mmIfpMSuePZp9dStwrqGJGsoTh426Py

B34SjY0QoBpMKfpxDh+Hmrjetv9cAbK81d/ZDMLQKg
g6WxjwhgUX3T+fxR5NmECkj1Iqfoy8/UI920Bspvfd5oeTJJmjQ1i8Xz/Fd+aJ8OhUKAu4rTKe6Nk76a

IC3EXgVZ0sIvcMQ5UsyAjWBH8SdsQiWkPef7Yv1NCkGMAMkAiYMy9DhJVa8zETW+3zK/kJnRia55riws

dw8Rz9PbeE4CddXTFt4BLskYBtkLn/22jntt3xichC
F1xSsLwPqVmpPU0LgoN99CjxfJaeHh3M7WVnCopYfnK3vU5f0xFnbewKH2D2ggfVbr7kqo2cpRT98lPL

bOVn5W6ODZsp9YC6ZT9oQh7+492lfiRHEBQcqw8srFL1XxHeM1jVtqx9VxRbFED8/87KTAe4Im5N6B+h

lO67AAWry6YmWyAKVi11noV2hwB2SUw09iwwiyPShs
uKbkLyg1rknXbmIw1XtuWom0ibpW98n/twQiRnZzdczch1eBM/B7rnkcHVzzidQOGwa7y36sjCmXsR98

NdE7koI/QgqivrciZCOlDKBLXQip+9zrni5sya/hqVzZwcmE2d1+dAdEfvPkrVqP6vvlZu6kno11MR6T

H2A58HvavR+0DSKV4eOYN5jqzqRWwt7Y8J7r+991Sx
MlW1L0XEJb/3MwoxeOK7GfGsEoBVD/7DHjwxS9eIWIsDDTAN0TQBO0XBeyqfk/B2evFyiQ1iFAgpy6gE

4Aqyv65zmRvJ5zqavq4WWLdFo/lom+i+uGdEjvl79zWz5gnyrSUrWaaqMTj4EHIf4FKhln2HTIebeZ8Z

Y3+KFgVlxxPsBe927xgl/toZkU8kLYpaAnUtBzo7Co
VovwSzJJq7UIUOVkd9d/ylnMV34e0Vm78P/0NT5dNVWxdc/ob8hQ6+dLHyIUkwWQ42tYyn+gsgRcYv1X

ihv0z1a3FH/hCzmFP6vgfPYJg0fnN0EY4m2o9zaZEOb9ZP1/L4330NT9/TcHPC8jo5G3Vwz49XY9yxKr

Cz7/GKcxK9aWoqpCWShLKMxrVixNoLK+6yVOrF+kv1
JLz37i+qAkwM2VFp+os4T3SgJ8eTRzjve6XgdEfelbyorZWQuM9QI2qeWkj+RpxxIGwR8Qac1k82IId9

fFJZsMlk412F4WrwaXOGjbxWA15y/qTefoXzbnW1wmtwh6p0l3IhFPkyRiQOsVhl5qcZsep8HetEISKa

ijui/EoP48LcD4Ea/wlLAnWmgJMP6lr3ctQwZZViSb
qo3wkue9c7hn4n78oSA42thqEg+FflX1GrTAecdg88hZfA2171HIYNYpAuWcJ9v4BIf+S9XWQL4TyTG9

Cu0owZclpa/qRrr3egIYoqajcs7mj3e/bKownlAWHFaUzXntL9ty7dVDyBTWoxJmqb+qz3527/Pgs5gg

apfOL9wKRyAcE/Cr44+MHJtigFfLRvE3STbkDk4AXz
JEEOaRXhsOKkviLiwyDvVyx+3evIgs9k6hBLY+PfhB2k5lYRkk7eh2ojvi39uTuEtYgVUkabUH5WSLnJ

hPfsGJok0G0No0jJBrAmro7w/4agTP9vveK3+IKPpApyTSwZYmq0+U63Zc6lUp+n2LnlAH/ZoQsb069Y

a8w2n+ye2gsW30/aV/pQPzK//56rAkwF4Qj/at5K9S
m7WCmTMIBJ1I1a90RwW1poCFWLSOWwaQvP73Bv6lRHtxsFHYAk8YYtX0B9LqApH+1zhq2bsHKFCZu6q2

8a6HX/xfYr4g0IUU7JPuNTXScafeggDpSwwAMupbF6Le67xdRZIOYec2wk82IgVXwmzB4iQRRunkSWC9

xbgjS3SQ/3EgNedD7iUVUYYhXltZDz4URb0Ce0DK0M
/hUc5bCvJdnyeh4m8BesL7thdgRwjiNYszoA/S8wog61LA0j0N2FEtcvew07eNWt7/w81pIKRn/L/iXl

iz0/d6RcuLyYPX9rSPKIpv1geHk9FEaIpPySpHCGeRSZiVN1ucSektLtg4ODtfNZcfcr9bAmX7jv3kYw

yNYv30W0VbvcIfi3/PVKpDNNK1FKJdyzNS3SniF7s+
K7q6OtP79/Dg1wtuhYmVUBj9ny8ORFrmmP5MWJwkOr/HsEzhLb+CIjmxjyDwg1tqfhPykdTzX+IRnujy

ktSNYdsTkmiVRZ7dev0aDToWUhHLuQ5w+RTRfal9jGy2OxfrvRbA+eWRbpsoFOzFSBQkHuwOPtxkm/eq

FR7DGkk2eQhZqCoK037pegh9TIYLcb2lskUfaD0nI9
WT1RxiM9NhUdgLd1ymAT4jnj/al0TPzAHAN8UJDov/NsL5csqhhXKyvWOaMzehk46yRsLwnumGF9Swox

IXSJnjJp3BvGLasG3hVDKoGaM1ec9+uzLqZSJIhteymtJYHd0Vwg4r1L82o34qqsZj/SfDGZN2hgwqkK

2VYLZkoHW5LU2DB7n2CsJ68z/buUjdE7zQbXAFS3Xg
T3GCrytQr5ArjWeVGgWce6KILTL5evrqzjx2R2ej5Aaw8FFe5lwB0WLTLiEDVrJrlt4p9dxnqHgjY4No

FmGXmpwLYjEZuVTUfPPQqX4wNFK7BhZnEHD6R3/uA4MNY383vGYpO7/3Mm2Z/jH33E/h3DGAMY0OTX1s

cLWW/S/KGcK20kfTXdi4XdEFWT00utcU84hiZd+RMo
XrL4E46czPXZsGEG0D1fOPtnh5h+oHXmMzepzrlxaoI0J2wy9kuy7EVgGW81bO8nfgQzcMxr6+UFlKZI

SZWr/B/yaQ7ufZRut4jfGKbbL4uR+oh8oMZ7UPQWONHZYMbi75WYSSmmCBU+vBgtSjoSNjs4IIzKkCJA

KPh5txpHVE2y3EdPSe88TKn4KOytEm1oy+RM893v/2
6NVkl6oSKcLY2GKFtj+Wcba6F0VnlVOY9AGs0oiiL8Ve/9wvgkOE3FvgMWPrRoeSytKH2Frt6gFad0uV

hOTGbM4vh+/V1A11wnsL4lw300AeTrm8XNuMJ5aLrfFL2bNgLVWetBXbVClgMDW4lH2Pp0msXeD3uTWS

2mye98s6mIH19l0gx5wbPer8+Mrj2S24ECEaRV/fD1
+sWiPJ0v6xK25SYwVOxGXJl+5LO9dgMdAOt1ujCgHz1ORiMQVg4cO5inDzi7T1WwI0kZVDUrMWQuwmyS

havUVur+2wDK8Llgg/hrdeifTpDrKPEFJ2gNCpq4DT6ZIJzd/VZ/pIsrBzLFic7XiG4Zzhx2+J/u7JRK

5YSLV5fzs0/fkIfj5nYuWqpETxUhkx43D0hesx3EUH
VjioLuw+d2pNcb2D+jRLZBMAPmnW3nO+Nm95idL7trm1ntv88PsTqPzMIYyPX3UbZgdHOsCoAYYNN9qJ

nXROd8xP/FyM4oGE9OS/t0PSIFEloTuiWNpoZTPl4kW7ut+0w3sn6eI6v5bZHKWOmRdeh9Gp+X3Ccbut

YJEDgz1ZYhgLCFLZagrDV7PHxgY++8R9VfqhaLyybP
MLSe3eyc4It4UcsbsgXvPVyjay/y/kybwhKaaI1dT8teRf2rLewwrTDPZq6CbInSJfa9bOD3JhmpTVE9

5bvTGA40J+tncSUBUOuslzOZ6Z1t3hhHxvCHRDuLQshgyzZjSPRUdeeigspp8U700aarEdhemrB5EdXT

IvmUy97Ic5eqY5jNcrgPqVbkR56ch2SeVq8B0Y6zn3
VGL6cF9LUxlFoiDYJjyFxAG5xRrbU5340cXXnZs25zQBeONE9Z8dZBOEbhlbslw0Q8zHSYqa3LY1GI1c

jAdzkVAMbDiLHhX7Wf/ZxY02lyYpxW2S7gmHbbxRV7Yr6V6jfnvsHooyxIS+E/vPXHXEN2jx+Gi3DwxR

fcd1UqWfSamA2N0VfDXGkT3Q5cAIkl7vHfFCl9se5w
J53Wiq/6XuVmRdDpKgQgLPPPnbUffAjjfbgTvWIbeSkM6I/WUn3TKOAS9k/3cXTdra/SxqrnQptEpTRf

AarzgicamI6ZjIabVHCJBEdUR+ofP7a8mr7a4YbTJaB3n2xF3KdlcaxMkpwnZ5KaMlVx9kbn0oPROrz7

hWVBKocIcuSyNXI1v67TWaSoJtx2be3qzJdwwRyqcB
6I/36dyX+bORD3FIWcVuzDDDcEcWg+Fp1cpVRsSeo9VmMoco/g47YbFLkbb3m4AwUqIBNJ5xNYQyYHq2

8JYyK4pTFcArBatLMUduua8PvcRqQ+CbcLZQlqLksESAgxwwXFcS6bVzi4ftD572nbByW1kX1owVlkHr

jstklSu2QMaJs37exMoG3FulVAme4Um0HqmH/bPMqj
ucTU/jrP8VlcIVejMBZ14FtBp+roPk9l4uXH8UIkb5EneMO3hHPKGWoL71JLX/BhCh6MGWCQKo+G44Cp

gCtRhUAs3YhdGxGhAXp9bPAi33V1HsdIwwA6djxPMvv6bmonR+050PVGg585meECeucAcVVEXfgIKPpr

1TRxDUXuEINZnOfJrBt1HV8MGR9El+ZfDppIPFmwcw
mgXKyfi3emXyY7XG0kFVzy1BSma/ZkfTIB78VC4sn5pQQwvIqL9Nx8visiXmrm/gGbG/8nTqeT/aPUDe

EKMSbc6xSDbrOV0Ze8kPQYbZzAlCArYg6mGI2UJzktl4XvbIkV2rhIX2qyRrh76IaXmix9/4mjmSDykt

MgMjjhUDEWzYgVjayqRUmXktAKsIdShID4I2QJ6+GN
OaxiEsL2sM10sP+TJuU3mhmyvW926KCMWParkd2Yynge8NPk2Zq+SLDS/zOBl2+S2GVG7z0ZMQRSYhAP

E6t62DY5CwrFxKsdOE/WYq5kyYF5Szqr2Tev6HNLBYTafqIKGg6uVE+o2x1czDw3cfkjsOzZYVi+w/Wh

fcvLrQgbFhe3Q08BodAfigx+oxq3yuqdAPDHFwh1v4
6cNhhKR+tf6vSegUVuGWupU/pkprau8LzOCnoukoTsQOjvkkEmCNtya9rLopJlFnRQfWhd0b7J5E5rIT

1TMC+F5sIlL4/Xe04B2hCE3jzg5OnDXoYajT1ssQX7Ow6/PqeUBTo0uhO58gQam7qd9m/tXwOL6TFXx2

B7ga0rMpMusOIpMgvPBb1r3MFMQ/KjxFMfNIHq01+U
ClrhTJfidEzLeNTP2iAVXKVfDC+BKzBYFKcilXunsa8rZyK5ujzoQJYs5FrZwfQszda8jXb1DrpDxq5k

6W7cOLX4R/vkFGsv+OzhyWqtvbrdRmXQBuwR3FCGZddaRVCRfAPFMbKxUT5w7GEZVRWTh8rCbTGFFwxY

mNdjfBGukOksYn/664OyVRD8S9HRBjvn29hoU7bTek
s87eS/b+wf4We9BdPxAE/8J4YNw1Dk4AoO71k+x0tnPxMw6S1NabWcfNvIPsEBmJumISVye/gcz7lKtu

VRrj1yGopG7MX7ioNbVskq8MdbI6gMoesYReUhKF+0Al2mOY1Sl8Bdr3yuuqW55Ed1siGU2mBs7bZsMD

hjiYUhOGSAGS71cXj2hSjge4xDqo9hZFAAgcQ9hE/x
8eQfEXyybsjnVcLB8T1iotvQE3SVAykmpHldXfiwqIMR+I/3Cjg4MKo8yrMkUwpvNG+8gqpdcR27I56+

6qfF2457+NTFg/G3TF6Y+sRwjxhbD2/D5Lo8Smqj6Bz5QpU2unzbSPyKSBYI37fnS/f7GQyDagHT1Nx0

CRNc4ZNvsSgI2ST0VmXN/g7FrwZqy0rjldrfDOUZUA
+GCqgHJHXelSC/5gr8hOPII2f74KyhZLdkAFQyMH590lXokL4tdput1gpS8dfbPOBLQQwD4QD4RlY3fQ

g7/go4QqtgglRICuDn3ZgJJ4pOQ+fCQjH44omy1mmU2fNFM+p/ZDiy8bX9tIUbsGKNAP8yDOO3IPOKTr

gIIksjOV98ztO4tV1DnRaOWgRF578lvD/5W9GeGIRA
jSB2Drn9Rska0av/vc300CYI9QkHqpVQiHRbPFh8NJAr5tf7Xl4zaPx8vbm8/pKxjlFGy0Varx5gmW55

2YXaa4mnNhBTD0Qf9beuwAck5/qJK8FY2pInk7KyELKBXs+gDCqAxB8F4uvhYpsV0AXZ73zDcAmdkNGT

1rFG1G/eAbpR8znemloWmVjPRtFwTUBL2MBIuoUckN
qsb1r7g3CaZzMUMAIZL2dtNX9S5J90a7npjHXELovgdLzfntXFxfO/jHgzo0SvlAy0k7uaKfrWUmAitW

Z3frG/z9e0e+uLDnl4oFn+JIYY7L/SCmcK4LHO3pY4OzLy6TrQ9Kd3j1RDkB8sY1DfXpJoUNmhiANfhl

UVjT4sYvgGmhKn3+N3T5e1Gx3fmPxFCxD8jLzjHSD9
EyGxc4wncyR2Vbn5q2wqcj82eSQGagAweKPlOwT0t7y2QA//SkimuWXndoNpyc7SO/RdTUu8q7vAq/Mt

jUcpgcrTJ//VLfIQocfC1o5WZpzwtBay9hCOLBRrEYLRuu49398mD+1HjL9KQ+2jflRIlv/lO7O03LCa

RJioenlzT674r1qWsbEkzaOPLLrQfDfJmEJj/CAfDs
D1Cb2uG/3j2jpau8e9ZrRfvaUC/LVDWUssqDY30gDiRgkq7qCsabmxAENVPiSxmUeZ22fCL01UShSWQX

FJ8k+Ve3wfw4TdYl46/dVryVBIh2PInnto0dxsMY14PoNa/9N9aROPuO1P4p9nsDYhvlEnuiVL7q8iw4

m+q9h8ZcrBKIuCtos1Oi6ywjajNCsCPG2kxZcJqkMw
i+ah2d3xvb+kGkvEyXz/dFGa32lrBTji1IO+DIZM7BuasOTOFouDZ4vtNsmLsYbRB6R2fsJHM5X+D91Z

H9JOv5QgIvCKyCzePAlGx17O49/atKr4rzE0e/NceeUW6WbG0RNWFb0lPuiWZ8npKwNAlzE9WDg99ONa

FAmWJliuEnq7DQ4INNvyhOt9rtgAuqaqtJ6oKoAUQM
2qDB5jSHVU4Skn35kdk1CKiaeSV0ooB6f/965QQAGTPw9UgMRGdWYwsPZBb5wMMq95rW7reASKOLJSh3

6kbrRwLiTJBlfQObHwxswPoiXw/c6VMvFo1wJos3TyUKeXW8IF75E71y/wiYrcYNXP8x0LWCWSf1D8gS

HXuQEBnHAgzJJnO2Vj78rG3aPJ7G+u7stJVadgI0Dp
10RtjknW/mztc88Conk35katAzygqx/+/U7t3rJYNnLqtzyAnfua89WjAJ7WpZZI4Cxen13Bf+VnuJeR

ORGdspy/iWApnTMUzvfUSsihGMdbG4+eYmo0Yh/wfh7B2rNSFDtzXjkUJ/ylU13hc59d+Uxy0mX59DoG

4csfUkPPcwS87JLTurbSSnwfwgxR+rzetQv+D5UZME
VBPdA4nr2Ma0bquHoNBvSrnkHIr/5lk2xRwhP90vwN76s01IINP68KCIrLo5swb6/gXuwmF3BA/mR/SE

TSFuo5mQ9lDr0Lj87OcAqSlyTR/U29L0XqejmjvCXLkxvJfLLljQpVeqQtWdyd3j08Bz1BehCKyBW0qw

dR4BnSyPZwHWZ+treOeDeKtCYuceqve3/3hRHehHB4
6egyrb5BBbZqXi4+jLWl5Co3wQLC0lArqEhCjXVpm/j+dCcfeZE1ytc9kvdQglE6abL1jDH+PLZ9WZmJ

eKV4g+KLL3JPE20bBtdx6slWzL3FI5JKTlv4VpV6+TR9a9VTewRIvxeMN+I8F/jPtIICw2C6t0p7hLQV

4JJWrlfQuQOYoTqdu52hwNb2WzIXmbVQIQp96kSIZY
d9dUlmNI87gRyt/XtLEYmPgbEUfWVAE6bB4F4FqAzvvoJMDQ7YJpMIaS66T33ZxpXVscwdZAObk7Q/5l

F3eTVvrXei6MOBmP0nyes9rRcXH/MHnCHWes+tKxmLN+g4JhHcA7YSI9tPMLuTPMFEkMq1PGdvLrrLzy

Du1mVvxFG8rYbAoB6hmqgzWUN5tw9Sj4VRrFcEi9g/
179zZ1Z5Cqvm17QoTDU6eJsL3G26tRXttiAJbM6M+maK49t9N227M9cSeDguCYjp/i/Rls+CyabmxdqP

q8vSUQGlJWbUBMxKPFwwrd4HaOwf2tWl9Bj5QeJz8uwK8FCePSAbU3BbJpfq8V60pxj92AGnigYI2r4m

5O1FR3DXpF3/Xbh6SMeBaQ+CiSLQ26PVjUjw+CPC9M
8gfCQ5+j5py1+C10I1d4Dii3KP3zgmBclSWggI6U+x7MDjM+zPfuMJBmzXV2/18IYxqO5nZwnV1qldnu

MFnMMCWh6aH4g3m7SSWh7yJHIw8y2KaWS0lMRHxd09jez95pJCsh2pD8wmVvU6I9g8wKxP7Hevhb+/IM

wUJ+2FaqXNNhD3O0nT+eRmozF/CDRvHD9G9v51p0Ib
/x3LC6od14ZX5ly3Slc0TePc8vvOyPfSlAk+K1Xxmn7hpBvGZ3VAW//IZcuBo53uu/1MRvuwF4tHLJNB

p4Q0UyLct8aDjmxR33sJklx8JxQBZ/EomzaQ5/L/tPaSJkSYOeYbJL+jNgI7SPcfzaSA9zr62+LaOhq6

LLAOBOHb4PQfcKZJaTwfN3PrqNnSvksdAXvCcPdiqS
gSQpUhX+kXcLI/Q4vQ9rz4oNtPS+XyTmTrBboUg+FLn0DSnP/7DM5AXvYKzEBQOVwd1x30ci8rzHNoZE

QUshbMMw+Wh0q3glHKEX+zuZTrAozIqfImH28V7e3Z9Sgy+jtv8B9P8WOS7EhK/Ay3cUl/BvMk3MJ7dy

8utqUpDpBE9wxuIsm6/ClW87NOe8+WGevdis9hllXY
+S6tsY+n+6rcxJ0fMJMS4OFmYsm6cM3a9l1UcNaLX5kpxuoAPZ7MSg5pFEugXhbxwuS1UodY68hVt3yo

K2f/p2ohsz9PZRh2TDXhwtU3w/eQFn9vj61oH9oQzr9/mzbBu6hlrgv/eIdXggtkaf3fOy+ot/z4gFf1

tLqlZPIpQA0uatZ+tYnQu1e2r/M7UOmYY6g/+pLTe3
7jjakQiS6V+N0kAFJVCBFrAKyIxH1Vfixi1+se2FMu6Iuk5fSwblFVdARaC1vAn5QSky9HJ/ykDCgrQq

N1TKuFbq/JtE63F4lgQcZWIRWFjDjdVYCjhkn9m3Nln2FROAeyE1sgDuoRmQhRfm8+iKP+K+Dqo6usKc

zrQ3qv0OS/fZ4khstWFyw6mNGn5JwA58d3SseZadzP
zq1e8KeZpsg4rgfXFTl5gaFrnNdztIn4tX6wHGDg8kpotKg++aN7PMlpJ4T9bUz+3g6/IkCWMywL1POF

s4G+Mfp0ActtR93J5n5Pwpzc+m8G0RZR3rnsEIijxC8oYuXQldYlUCfoGzqKqaouri0yU+a7DdRHCktS

7MQbpcYhREFr0a2aP3NkyR8LYtwokBvDHHe6inuJSz
f/YRh/uF/XwMPFwpWG6EylZMDy0tkj/f6TdBswEtJ9XIhShia39Kxy3mCPFMV2iCs0fa5PPfM633q1PY

YJUO5IgXs4F5yJNDBZTeoidtkfEm34qmbqJXs9O2c88q6gDoZvB3/0Wi/uRQLA2n2OWCBVfeMMzgfQAz

pspdw6FAjMrizC8coqEXrik0ctcq0425SEkBa0vcAi
yvdqq/NqIC5FPR6GHPPlk595/JEvVFPZfOIe42ApZo3JUNKC1V19/fdO0it200fgWcgLtf/3RRzWHlzc

Ah+lLAbAOX2jRAxxyxAZs6ESNEx+nbFvz/UD5MtknKLiorpTdBgekV7S+nDfbXc/ZQyVEJbYjo+lOJVM

2LY005nT6GI7CB8PTiQJLt5Op+cpJmZ/nH/pzYjJF7
UGAkvbVxmDTHswxPVa03BXy7UFkdWAixq9AQkUrU7DmjA18Oq7b0Tp5YEPzZzyvjSoTDF2ZfoInwtQix

2yKPVYYMCuuVwejhBpeFwgiKYxUe7XkCWoipnNWoqGBK8OCo2I+uLSmKwjzlLcGn2rOVKDfOg9q+CviR

vw5Oo16A+bM/Z9oLSPvNoKoBXc0D87iCEfqzOzafgN
eUSE6q3npyxwoBCISEo1pH1QfC5dOeaeSn8m0zlSNJmDbagviZ71EPLvIkyhY26xr/gfoznGfqKO9VOG

RptHZ/8EC0kpQlhPFyxdSX2OanxvmrbuSQKN2iZw3wLo6iwS3rjqv611kMzbEFveKPE3cZGqUpI52ul2

9x61qO35/NGLca5O/9+gDILYGK1dIGm5mv2fSLGckR
ZSl/xJD2T/F7BCVEXHeTPzyD5g80PLvzaoC3kQya8nTSlr4PFnpD9Gf9hqBBO0d5XfpNYPCe6BaFu9bL

CGAmoh38KzVp7jkJ7meEss+8JFH3XpzPEXJBdvka1FuDMDzWJtfJiIQFa/HVi1Ft9ZOrloIAd61FeRLL

AckAZapj18FQmYt5YHPhaikAgN+n8X06OjJQFZYeoi
z8NECu1ki0KU3S0KzH2wsYRnW2ZcWbaXmaIDaKlZ3E3TVXy6/nd8J4NnMpB+WXxKMDEGaxTYZqB8VTkh

9vBkMKhsEgKUPDsQF9z2d3ip55OBMkS7bAWQENA16pLPnCKJjSeybTeFHDOA2gutg9jGyJodxTZNlSLu

jt2AdxX598yQ3eahQb+iTajEHBqmrHD9ApasULUL3K
58cYeMCp8+MHoZjtQL0kluaDpU1cWf1RDp0S3rebyn+t8/v2rkxAoBzZ4J6Zx3yrNVgMmmeE5VgxPGhq

bQ3eSHNRL3/u//qpjoLDYEeNe8wm9gFEcZ2hizWFkV3jDCc86qa0uKGfvMc5wSYe/VKS0cZbvciZunyu

WdEdaJlGRWAGcO6yP+V2S8/v738E9p+p6MgDG0JiBw
AS58IbKHfukmwrDPSAdHfeBTevEch1bc9fkZeTWFL8KuEsdXd2K/ssFV12k9daodlTlFemwrzSI/PV2u

rGqpLXI/PtZ0zA8TSOA8vlobxqlPfwgm43Cyw8/FdtLXZsjFjfZ6+lLlKCe5+G2q0cr6i4omISJnLHKG

hEe7BTRidd1ndA1gtFnaly0qF7cg6kcll+1EWTBrDr
AXWMh8qDMaWL6rHVQ5yDci0Bl597kpWQranE5yQIOwetiKITzxwP82YWk9TG4JP6sO0lWhaxbPMUUboq

jV1+fwD8GvlE2lQ06Ugg8as7OxpvCXOnIaLj25ge2DNs6p9GDJFx09FyxmJ42P5RnbCrP1vcg1Lfc6/7

QpcDooiFqEJiYSoXweqtaqy2QSyYd/P8vz/doH6Tun
nxz5D1BKVSj+oK9vYtAP9l0xM36TCJrjzwzdwQiDNjr1P7uY+ucQTa0hYhS63yadVyKU1dSNdvqh2zF7

rQg5/Yu8Xx21Wsqs1uqij7QFyhAruyIuDtQ6bb1xCBi8XxN4ftXi5ZVM+TGnBYY7wsx4ZIRddQzlozCm

K9pbULxQcDOePGR/NUsUTxOJZai0UGV5mNZF4K/c5o
51tCqe6OEmPiHC1a1YwLivlIHU/M2BiUDBxTSG3EFMm2rgvpiur6x/IvRT2new256o0oiObtaCFoNC+v

wrYgXxn1lMygBO8j3YRPFwmoWNTeeB9UCnglI4F/E3FNwMrfE3BLxDgPsp1Hy7HkAD/qy034eaDNQ4Qm

nf7ORAKv0roeWlKVyF3JJBX76hbRX8o0sfb/UUpbjj
DJt0l0kFNvXRRdNjV46CN/kJ//xZ1oTdtZcy15zwA+gX3Jr8bCu8OofxLIPDLsiPIUM3dwgYhgO8CmD9

lmJoYF1hSHWueq3BRdxvg/16rZO9TLQ6OhMxq/COCYClYtXITp4vqayREAVdtDzX/nRkENI/wkFs/lk9

fuWxXHMUdu9CvoCsu26c2jn5C1Nhqz7cFmq+xI1Ce4
n/d7JQ9gUrkMzkEM5aBBcy3rcofWFVd4QNbNvvjyULF5oE5/vu0m2jG/M0b2ZT4JrBcfK+qTmUfIk7fO

tXJB9mMmRel78GGp3yoSvmg3PAdoec0XgHYheLRryyXFM79hEc2cK9RYJudVCZfzH04ztMa6LiQWCIUT

Rza2vV+J6ZOGP7z42UGn+gyqP+MBn/XPWccQdFZB05
lshnGIKy1xLlZ/GnHmyKd4WTThKYUK8tgEeoXB9ppIFsGLJv3FwwN3OfpUIZ9qHelEGVZ8O+mskPygn7

dDApBsF2zo6ksGChHSyt+ogKiDrIt0UN4kcdDkrlUi4TyV7IBxtL3PxYbEE1ytSFnBEH+5fwQc56dch5

OJaYbnk4/muBPOyeJtoT6LaOWBHLSEQofR4TnF4wRn
SB3/Zy9pdHUrobddQuCQvY8WRVJl40xqhkvMOpM+4weeavcsSMPimLoXlxEGGtx2i8xVyMHsxFbekIt5

pUNxhDR1HUe2e+5Z1jklecERJL8JKeUa+2OI9XlT7sfs0O3SiCoi+B+98w/+2FWGtYqD63IJGU3z7OYj

y+EW25+Z/yqVdnCWxFMenutjEbbDfg+40uftgZLfWG
g/vAcsR2/eJmV90A9FZcmVfs+mW6+pxHc6/yVCeruUcUpT0vJ3tHxj7h2qtHKqOcFEOEv4NjHqFLClJh

U09l41vLySz14//MZR8J71XZkFq9+tM9vLiAhRqofxurApYZ60W9N3Oeku/nZGmVph56p5mfU5RYFFul

8EnqIH/s1ic6JNBdhYAq8DZVqTs+nl/Nh/pObNrjtz
uGq99/oPR8L3EFixz9h7rmUiLHyQ+EQ9E6OMf+L0wy9Mf3fZSp3+6poZ0lbUYBIpday/lH8yleDtzVMD

yJflDMZeoBqTUOjcdP9zYmf0sB4S+u/Us4h9yoECEwqCCWx/0vu0q5y4FRkFWJ0E9WoYxO8OLuC0Dlao

B+yPH1SWXp10pCtee+zwmxvkjwZnZVPU/M1l7IwugO
aDuF3vfkCBqu8NepDhFxLp0VbXyI7r+UM9THLJsJ40JIBlU051+Zaekl21H+REBC1nHVlcJi+jP8yz9F

V/9WFe211/kfZJPZOydM9qbSNUSmKWRlnvvmedJRSAXHrbdQ5QvxvvkMKplDvVm1CS7vmkykAKypJ16B

Dj/lYwHbFV1qy4onM438kBGa5jbF1AnPHBAqiKdoM2
JtawvYZMwalDn/RnIc8bKGsG/orcFMPpumxICq803Dp2bSItvH6wsDBZNYrrQPQbs9bfQby2P6PkOEUA

164MtNSq3bjGrgAy1zrPMrADKzn7E7u/l/uwFHDHPPCWu3s07emIym9ZHBVobNYljiRTjZkmMM1AClsC

Mnt1IA38y1rmGyjPEk0krUErmQIpIRx2DgzDLw7wCk
ziysWg1i6S4QJFek7K377WSOLsUpPNeiQ2e5KldZgaH8JyoIXc7LIFBdWeRisQm2G8/T8hMfAxeWr069

T5vh/2t1Q7vhyFQFgXFBPM6sRyiNVEsR1K5V218yL5iUx9T8IriiSi4w0w2nGsW7pzjS5bVdjM7Y4hOy

JV2OdwE76KG5jeC4/K2he+ffOzDR+M6Y99AjELpRFf
eoo7dacBLB5Wh259e0fyVfll53z+hlBKHYnbCEwXHNJAt8LpVKvDxJIen83GQXgPqj16RPGpoYJTb227

FTKCbg1B3GkmGp5+KcYWLJIJLKgGVDnm6u3mzXPshKZ7FkUW+cNzlWq2rFpjMmL5gOJh1uZYFw7bzYli

kD4exr9+HOXXCQHJaIxnnLRRxqycn+5b1ZewI0zpYl
q2R5CQ5yKoGA+F1p5/KLCr6f156bzYkvRG8NCOCkKTE4CAFYZskyQTKvCW68s6kM2peMHVjyVE7SaCnw

rFFL3CjLY9Q1q46bqdAprfWr/qL8difsuBQkJ2EbXWyKo3qnOsyAyzotVk079GplM0DgDf7zkhUN/e0i

XejrRy9inQ4jS4+4ce/NDUDjLzZxoxAoZCqN1Lw355
Tolrlje1Vy/xpUYgjrT77Mc1W1NNHgZukYbfRFIo/0wcbktydKrehtb+dAus+hKYVpFNy2oNgf58VKdt

r23LcpC4ToVAZXdPRWOPen5oRS4gKZghi6ZC8RC3WarDon59PR/kzjPioi2nvZvYN+EDxDoFBjR8+3Vj

iChahcczRd+yFRGqbwA4M/o6jpN89fOwz0H3a1ibDq
ThE4fAQmqf/4qRL3V8GlRgxvY7EoM+bA2kMwlJK/uZkNNjUNHvjNLJfD6loEiuNB2BOV2VUzpgKMB1UD

Ep6KP8/nyA6Vn0+8grse9WQ+5DvKqt7v0sP3v8sfosxmQms5UScaTT9UsR/P4P1GbU3sV5M4thiTzYQD

LZp9kpr3Eq3w7AyhX7TH9Ok4+3yxFB3jIlzoBpnGih
ITK9eKKlGArCrr2rZ0qzlZQgqYXRtyPug8WipZtG7Q8XRN7Cu3iwC6fVcymP8aWk4pfvWsWDA3KFf1tt

7RQUgooGCAxBtnFuwyrOg6EXTUZ2eH4xIoBAsz6gOGUww4EAFp6syXymCju/cIrPU1kWVTWynCHytEvy

eb8QMiUPlF7yRs+kp4p671c1PeXntvfntxkP0Px5vQ
bdPej5uGU6F0gL/+0Z5y8jSJeD+U9/uQU4O7vvL7/A3sU483eXVIRbetQFP91n91EqaPLG176XD/k0s3

H8j159X7UNvMk9cuvhtrPgiq7WTwm+a6cmtfV0ZsiDJFYtSQxWjUO01t5Z+SKGtqvWeHXDyZARNELZN/

DAbtjHnGzgQCEJ2iF5uCjUr/dPzyu3x9aXu5KlexSd
Sihy5yEUeVgpbExjw0J1DVbNVg8oXe30pp6Fd1XbaUyzP6OjBneNRJI2q9RdsWk+Eblda+w2U3sxc6ut

Tt1R+owy73mMCvMyF6qgZrw4ucNeYR5/kdFjI1iVKOB1NhI93QHPlADcuOr0ei1w5l15NYwxmjkdk4eL

QOtGboGyikkOtMyK5rz4CJsVhh7HtT8q813j0dH5Dc
ey4Xx5jyrHLNR5SEDL9bbazKAvjvbT24lcwsaQl+q/+9G+BdQi0LZqftKc9FkVVrbQpcdsMoMLYRzEsD

FopNdncLvQM+QFAz817vRre9yyhFO9Qclxcc2s35GVfDTlilaLft+ifxLanEjEhq57CS64JrH5zTcpkZ

n/3faWWqVBAv1XwsvleH6OQwKOH+GGEDjUuj8BRU6D
yhVbu/kxqUOLk5csxwAwspBaXezmg1GF8fu9IGDW0CSWdGbjV/6/FWep4dCPg9GVqic9d6OoDSsTKOFk

UH16TpFG3ClNRWtMURbsiTV/bZp0cXxBFJO8oHHBb8c2t4I/usYUZ+M9j6Y0Cl1b9329K5VQ0nC/c/Zh

SL/7FKOeDw9nKdTg0pCrkRHZ+heZR/2BAmJv7UGxcR
MU4u5eMHkT661+dse34jgguU0vWxLAiOg5C03k20DARGz50GRQWpbN+994kZJ/rwoaBzf+Ff7+LoK7Hb

MNxN2zee7r42gW3CvFoZYBzYRuitaylKLx7PkrkO7dGUkApuS1WZDRe1yo+9m7HWY/uFjue2ldXjvLIR

LC1Dou8tnzepqM9P4iJn0Z7KdQJJWRxYqAir6+UCZx
Dr94AckrxwA2g4iS9FWAD/nbvJs/Nuj1XGK9vLNwLtVsj/9rFpIesRLyEFGqv1mFsvu/BAxJirpk8Ypv

zhHiVEz7ZC6vFXKcgTmaZ6D1AoKF5m8wVSP1IJcCxo8AXrEQnMb7epcoF4aSjOeq2UuK7WQR+BL9MnyU

A8eP0RA1Zy3uX+cgcVdlkrNoBNdQSW11YBgZXM9wZQ
3G2qYw6kLHhGBVzAWSp3U2oO90pI4QYVOoa8vTEFg+YC4OaSs40YLb7V/s2+TiY7AQQeBxr+7Glg/HRg

VEbJrXAiQhrVXqEkQo+MNJPKW9PHf36eE4Klwv5y5XjABEEn8fyXpx3MHcs+H1ZH6CaypI3D9Ornxb1V

ombVnwRHaa0T0H6GmIzzzRPa516TRGVlH9bjash3B5
W3ShO6wzgUQk4RraTzHkZWAqc+33ZxFvsVeiBxPovREj7Kt8EKn00sagU1h5gQbzL97vd6Z/9x3xfwIC

7SMjVknV5zWGXLEOaV+7gTgsjC5Ib6Isn9Ic8ltz0qozIQj3QSVHLnv1k8eCJMcWZ6x5VuDm1NEUoedl

MSu//NjwbLRhr7aGVmJRx6SapShevR4WrWER2a8jcU
/jg0G5z60vtM6hLGuKvw3VvAdqUnesRfF4ceTfJmpM7na7Ca44cF/0lsU5VKQCAVum4OTeQVdCSs4Be9

DF4WSRNRrs7jwR2PXTuVkhz1oPchRyKHZhai3SHG6xRwUt0illqniD3Peahq0AlXomWJIj8raEY1lT3u

YnCg2byVwfSH7grtV626OLmJJUrY4fLu2XXJIsxNyi
gYxCvb/d0piZrlaZOctFrfa20rB5cQb3KkiOG6aiiZpT1U+bi1VFu5qWYkDxEntWivjfaw6lhWJ2Gzm1

b/9U//4pM2tFMSGx4tDxAnksYx80l/wgLtrjOmH5JC/lmAxmB651ob/vQuMJQnUb3iC233MkY4fkeo8S

lBV+DRbeFkOhdd3ukinFGmLxobG3RK0c+UYeIZNMdn
QjwpUt3xbCqAXiGvdFFVbk+gxG3kXhcUpcWEE7P3+lNJy5EtLdgxv7fPdeDu8kTpUPMEm6dyw7OaYsMe

9O2MOreqIWtU34ZnDtc3Hoe6OH8ndRsHe0dWvyFyhnZsk0H2kSM0F9CPifsz1TprwUnhT9QNfmT7jy71

BSjaKuOc1MrO4BYqhxVzT9EjlNjkpksHDRxqdlFTvq
hVNWKPdyw/bGBZ49vqidbi1VjwgVLqcGn/ucjwpSDEauLN21Kx90unkkxjV0q62Q8SJLx9jScoGrKAg3

3S3gvLepLuGRmShf+DpVI8u+TNpy1PvmS5Q/XLLGGmvUVnjVNSchimb0o+jQOenRFV0wwMLEzqsaihNT

k/rssOsNo4RabABf1ix4Ez44TRIT8SYHl5ZmgpvHLf
kYoo5o9XUWs4EW6UuDeFkv6yfteva77vFniVOTiFeo4rZCw5m4QA6ifD/r3iewyWFDqfzpZEhggmJIoJ

Wx5wF4XKCD7vPIsf8lRksaHbH5ViJ8MpGfL4ueC3C5P7UDAMMDC5+4CSeuHvrFvw7VejLKcG2wiqlS48

6WH/kf7Eym4N+cU0J2I1QW7lG0FsRlErYNSrZvlRiF
N1Bg0s8o6Gq1vK9MN8fX5LKRz8Ol7I5R7a1ePfEka3cJdNUFGWA1EGWZu6ns9AoHcLMwoUb/ghndI1y1

Sr4vYNOVm90kQ2V6KKcW9y9BIvjFXCazx2vDdVceWcd1B1nqV9R76/X3/SjWbYO/9vH9fjQt/xG/bU7A

p9sPfg12XxVQ+yUn4PSrVVKnCFu9qAD4LPTxJyxzO3
id5gdSMHQvVnyD42iOoEh9TKx/iZ33cix0CL6vkmwaKLisxEuToaawRQOeMaHlLzIUW9FvmNBXJzvQXO

JYuVHCRTXS4qSGCPAD/GXgq+TQyS2jbA3zJP9V53RL3J+yqpqvrzXwMq7gJsJEkGH9WnHxjJSJ8QINuq

oAK3bPxdhyLuxYSRKFSkq5a+JIV8T9au2JenXdSvLu
pXhurjQPvHcha4wWBqdiUUqMvkFsyVy8X+Brd/q1bDPTsZz2kgGcZUoaFgPEUNDWYNln61Qy/h2huiJm

tBq8aG1qjyveFT3TdK+vPXwhvV0Oa4C6HvCVhGk5cF3ItRaYPsBHn9GMIIYdlD86A5ryBlw+itdmqc5w

Y5BJpaNqxc7uoz0Cywq4NEBL5QR2qAhDETrz9H6kzl
QHTUZIB0bWBUBbft4deuMNHj8VgZhEDlE7eFbqPy5/N2WR1e29OUDgDpi1s7itn8bJT7aWt/Sngak+wF

F3giVoSHvImi5NYZl2+9pXfaTn4hU7R6s04nbXKUqKAMetPInixqmOfUZIVbpMLVRTkNZHaA8sRJSEz2

+jKO5OggK60BJls++hFU8WlE5mw9Z9ETQaIICLn5O7
I9zAuWi7dGDpI6l+7YZ8PWkiYCQBdbFvmrymjTX0hUa48Pxj0Jj4ePI51ZpkQ0bCCFuLSZd5KiCOtUsx

xIp9uix7PeZ4b/7pRrf6znMCyxaSItn6hTrF2Ltjhxk09ZmJqwRWgmGdz0V2vxOX1d27hDYY1l5+ast9

InjNHWAmUiZ1SgGs7iRUvM7PSTXHdhEvawa42sN+DZ
c04luZP7onXvXMcsdBLUmJLXj1nBOQ2PKi6oZBCh8XuW1QCh4livtRbcqRP4Ce+OLmo4+OUsL7tPg1nE

0o6lzYTne3ic/o7zRZTENqKo57wYz7hYrRwtUVSuoMzWIzCV+WEh5VZl83c3OsCIl5pafEJBcKzOUwhw

HKBftezrmH0P9iX7dzWUH1DU0mfpv4y8evB90zVTgy
8wvei5ZhXvRy3AChJxCwd0K87FJOKCuGD214sOUrrwUqLk8mWBvo3xYeMJ0GuxrH7tGzI4tunmkI0R2I

F06N6qo3po6Z3qKx9k/+WdSFpQliw27qz1HxZJgZqkU5dGvhbHTFaUcBu2vgrVkpVR2B3UsNm2OLOCJL

lpE9cnoE2L3zaN4C4cOikpFxp3MurKQ5C94l86rZCQ
EDPu/SOgvB+9pWeyGaygFskaFC+WnBChYLVaPXnYnKPXTReqXRwjkxlChQrDlyi2sBN1cVcPoir7Xpy+

Mpfkd9fb2uRv5GiMAuJpDA5xYiAekbj0w+TodD3hoo0W9XefQuuC/8Q2QrI+CCIOYXovfMO6phjBIb36

NPd4q3iIDNWZgr4VUMcNiOGMLWagqRI4oH5Df/cGvE
WXfG+vpK+lvlogDR6lTUNkEfvWExnIz7GACUu1eveHL65kBpdTKaKc2qWSwyH8hqagzePJdpfWrshFIy

Sk8M8XOYqlb+3kqusVrBZPHD5U95fowKtx9UDmRaYKPWpSHlVzDb5fx3LpfToc1FciloQJPyoXjuTNxP

tDfCX1CHd5LOdKtmYih3rPf3TVutLX+K3u5Yct6MI6
ZF1+mIPbpQ8TZ/U7ezw4USCYjcvEVgXRIkW1I8yUSZzYsnGWdy5q9baHzFBR9cphoFbhSh/0DWx16eq+

aoV3Iwdiwh1FUm1yJcb5anVuFtjRvawCxYfbY4/WRlP7fRy008k68+UcL8Lykn5CcN8O/1KFKSQrpguQ

6/LMyKs+HDGftI+37eOjllWqDiccRpyEypqPNP9ZNP
6+Um1zxOx+TOR3QllG8IeDsq1zwLkJAPQ4AQGbDqDaObkLxcG3rHxhDGg4/tU9XzzHsCJxEpDh9JAhMu

qfHevwpYKO+QWSejyU0qD23pE6Z2wr7tX1gKXzi3+chy7mOMtles5Jnj8Y1bIytfL2nxRSaV0eAza3df

wbTPclkKkVodln19Vo4zBhq3hQ7MXf48s3NreN0gJB
pzd5jq75sf3VNkhpqI2YBbmLxJYwr6gJuV5r+op7lNvX+/P0YwpF9qmpJg0G3W+F79EmSTAQIyxf3a

ehpcY1uwGvWkRXgxDCjLa7kyAXlS1L/zF4TRyoS2w4BvltT3wquSJgxKpWUxRI3TPNGU/IBm4isqpE5G

fehSx+uWyIB1+V4TXYTTUH7z5LPgfhwKRIL4NGX5qW
yeMOW8q+Yu01qVqVnke9Lm8mLLzGv3+wZigHJLcXIyzjpC+atgQ3Jygh6Zj2iu8bkilqerQVfg1iIgvS

ejYCbJRJB+Zj+gGztecoOITgi7NHGs5ZZj5FSLI+euWAHuWuD9DvbrkBoM6e2J139jU84lBxP90sEOl+

WSwhsVJfCGmyqOU9QOIUDcxc+HsU7M3VHawCiXaklr
htY43qwOzVuoBDjmyNuzA69KIp/PZ6ee01JjEA0RscGIP10NZmgGt9w3C0jiZCx3qr5ne9lEoKQkE2lE

ohbvySuV0ZnovoqjsG2J3oXVgBr8vq1WEkLctGdE2VsaVDcen7DGNsEbMZ3uAY/7gpXB+Co9i9yqNa7i

NcXZMGrFk8X+jB1kskN6xLvSls5lALAOs7+DPRwwBg
5l1LkaPkw5bHZwS6su5FIJDxiR3+wTjmIarGGXS0HAE0TR2V73a9NJ8GRVxFYErax1aI0pdJlnucT6/K

nU2OfeTxX0GOIQjUG4wWQkiMCivG3UnUCXDKiMlYFLN29iU7qq33PgsH/RwkdIxEJe9OCuONAGfdZcZ4

OkDjPUu+jyJ3SqNSPP3JUiqKx8zgd4US9Q0zr57UCd
wx0UXicC4DSENk5l0thcNZHlFkQgKEnhibT/0UHIrdpZpXKM9VdzkvqtmXNbWE328KdBLqeZIViwSBHa

ZPyLD/1fDLPmN/D3Jdz+a2CtoMPN08h9qiZShbUO5/vhZu6KNugfHCqzE5RRwpWDjWwMsZGm52tkPpbR

l5igiQv/oGsFRS4+CJOBypiB3G/SkU0d8JV/itn0fe
wDspKygYf+NRqly/AcfEZHW5pOFzkT5B/1CwuLxyN3m+KiLAfK4KA63pJ/KkXEM4nJZqmTsf33ZN84bC

NtaW+ZdfoJZss3IIjgEJoqAcMLOKxctUhNjfb5CDg8nffvSdLdvomOVaboZ8MEeNKQx/RwAxQFQ6795l

O6R61aTJ68+42wrg+M+nrZqjBKUONX07s5qJ6PYKpp
Yu6DY0fJKSCnaKJ+r3tlrI2HlxykX+fP1SphIkvW8OfR6RwY3wjxr+Ejd/xtuNJ74UHTQwR2Y1T1/kTb

Znmgdb3te+XEuIiUR4d7wVh+n1tUneTgkUgkYf4yv2iRCkptp/5yO/MtvNtOFQBtT3ukV2bwKcvA8oD/

RmVa7rdJtF7mU9dwTK3fxqXz2Bn5myXjUy+Fl3r1UQ
1EH4UVCKpvYySx1FEp1mWmEDDDK4wAgB2orvQNRPkMjpjI5pdu4AJEalsigFfywVqOzYI2AFX5gO9BB4

pbxoMs+4Tl18Kje/FHoYGa5UTj3QRv09JSv20XRsDpzxWArEAq5vklTCL7W6H6saDBtGdVOyHxRyqeHe

iGJiK1F+LBsaQ6mpUEwD6ZCatWIZZ6VhB3FPBP0mze
b19U1wCJIJ8U/eP6Fhf9JqTx4/ty4ewuv6K88RsSX9hNhhY6GHCKGE7V7kdI3UFwIIjsqNjr/y8QtbNe

YyrSq1WMIhQFPHK9UD2++/claNabKNeadLk9UHC8h9PFhEhPcln1fRI6IH28lK1gKxB3MlnLTB/rEGGL

fkLQho/DvRpFO6m+LidLDDCxu589Dq/9GH8FQadD6G
xF6zTV12e9eBfWcdBwQ5tpAgkLhTiOWXG+Jy7nMg/n/VQMNJZr6J58E5Lfz0tZm97o1qMDd/ihCL15ap

CFHo8shDuEZFa3IKUZrywarmfdTHIgykXfNjSMZ+++oQsWiezNwN8TyumzFSeg1BYsj1OtXFuEnc10ct

RK5OhxWgSogg5TNrrAkL8+P8ZKcutQOqdWZs8tS5jx
5rTlM8xwqckVzQWRxWtqxh3J8mmKkd3B6YgpiASF5vLfAuJRl+772s6YIc/N6gRud9Mc9IqAhbcdVXVU

06mSOGP0YxHVwVXHjP5T2/yZxiatzGHziGFbbJ6OyLBAcJ+PZzIWj3gyp17JhCHF6F4Lk6CVuRGSIeo5

0lDJSqcqeKn6uBKTXfIsp5Z9bgAtiyO/+DkxMn5SDw
cRSXw3gQfqh7WP4Gs5GUVvCnZ5Wm+4k0j0ihswkF5O0CbAchP4dQYekc0uY+1GI+zu2u2n1lzGzkM6Q0

3SmH4p0fxZqH4xz4Dyif2/ykzw4JsN5+dLETXF7sN9M+TAB0bEx1Tm5v56+1i+zAoDqDcNx3EoyF7Q6f

p3u5JcWU7Zb2gNqOq3ACQUkrxkZxqcPycJQmGdNhDE
TH3vWAV17SNzmX+v1Ku49UyBFx5e2vF3FsnCS23E5a9SisXXl1sTLqTjzXWVIRqyuebpvs6gufqF2qrJ

Qu01q0ZeXK+m7xqyAFWxQLbTbJbh/OXl4hoODSLOPkkKWWQgYntYiaYiZwlEu8B0ahAGly+UtZ99IqiF

kwMjJyFo+LGnnz2Z11XHtRhNWOHeWHVjzvxkKjr2DR
yQOSTcnssd3MG2AKELM9ExJItIoV7zfvx67Mq2pV7fmPiiPS5tVWHoK/vok6SIXITsUr1TTIE1h4AOMy

5/q48tuzHjeNeX9Qow9fSimIXA0pz5eqUoDx0AHAoOIc4Wt6hTDBM4G2ZiP3ivQcHtiLgVUuESIHHJ3s

Q7wqacluwuEJ4qay18YQJMJOZ/a3EsGRb//MRRZ9a1
JPt4Q1L2jTbavJXItzaObRruYWo6vsbSMJYJZD/kdgiv0FqVNYs8k+BaEQR7g2x14NYEtI2ZsGhqWyf7

0PZA6igX7JgB32d6z6vZ3PvXF3WzCxEogrrnvDGX5eVwL9gMJKPhF+Rtbxraa3bFHRtZkTj6YgT3K66R

UCzbuGuY3Vy9VGINTR/xi3nagSQXN42bCdENS0g5C6
tpAifLycMmvCuetF3z8GkcUnlcw09wYUorV0fwoOEIjQf2OOPuS47Q75MVnS/ugJFAIutowdNDC03P8H

oqCufr0qTvzeKEavwj/yPqX00cw2PoMUgGRDWWZ+i6P+GVthCY0+3iURkNI3ZG3Pvr6Dta8tec5ITdY3

mscTK600fEdJSbpw9H1Pc2358NwNxlkbeKhMq4HCoy
tlzxLzoB/3uZmi96bwCttxOpVomjiTIc4bxi2RfN+Lp24xqw920At2w931XS/1AGxdS6h+kti2Lt8hcE

+Uyp1jJSv5SUglNO0VQarQ+L8S+0G9kfJrS1V/aPOMigV50s9t/NdM9f+zJSUhsdo75ckqEw4byMR2Ah

Ajh8kNQjOm/m2cCx9gg5MtooVVs6YsoKu+LGGqndKJ
2dhlMvL0T6GXGdaKGwIFktmPdo9x9UkkRaZjq2JpjlevoLhndmu1gTYntSytiiyAxRp6OwPusQATa3bV

CcDpsTKc3+z30LHzbTsoLnenf2IEPfRR/Lg0mD80cvjy7pzJziv3UP/RMvNqVCMUj9xCUc0N4K45mUgQ

rnD2c8pCwZoih6pm6kpq/v8TsEtkR3x/BnxLbQZkt5
wIV2HqNwDQBNGQzGLY+55OTe3oxgoOA3j2/7B+N54cs5oWbfZZvy1YD6H+FqYlnAybsvKp11fuxbqxpT

6sO2Hrj0bDXxggYytj42WSHGxoR880N2piZX+lNoSGq5Q2Cj2dIlwzPKqZ9tMkeDjwrUav195P25pVh9

hZo+edW789XyslURhZXI+lnBbSGDNrscvT4MlOiqC+
Qg7FgVjrbmFMft9d9O2TfsI9KaZZ3R1e04EMzUG3Z1Rn0O4iNT3OA92jBiRir4F328VB1dcr/Vl3iaBF

OYSejfuwt1wvivQFaBpEmH9kKDvIihTJnYW3C9rJmAMze9J3ogrA+tjPTzW9F+mezOP3K7aVAXIJG4W8

JIghuEuYOsl+NmnnSpIWnsWDsUU1Gs5vEMGYROarRP
WZEzYzI+FQb7qJ//SvPWluU3NKJQNOB3mVXCvJCj3yUTfT6/6u/T4agKB1B+QE4LrI82CBj+mAg6P3Zy

8cce0+Dayron/KAIr43a1ZW/vRBuSLQWQb2HWeQ2GjNPbpeTJkYbtCT1zMPg7K0RHpH2+4MF1ub+jrPwDmJ

PNLpk3hF6xI+n+jH2233IgorgLv269FHUDXac6gjsq
Vlp+/zNAWECuT0T/yvruha7bjsXWm/MqPBBJpuPYaVYJJxEhITB5VlKsHMzXNfALaYIJzX2/6X5v8x

tsHkucf87wiEXzc/1l7r2bkKTKkk6bjSDRUgyn18qWWW7XqUMKUQFOy1pBXylJysvpQlB/qh9DzrGWQz

EXWWWOdymWnf42ZPbJ2JW69mAdpNH8y4wobTpVy2wX
m4B0g+2UvJBNDwrDWh6Un9m+3emBksErRY86mzYkoemfu7FKMsQqW27hDLldw4tm8X7U7aEdWd3MSRlv

XYNjtCiUFivzhXvMKJcjGoKpd04gcDz3nNZVGrGDiRFrR2db2tT2xC8+utBpgHxeV83ap/v36LhVHyjY

O+dp0W0uZlojky/0X+hF52Xmr1r2tjJQqg202GYf21
UucGIUiXwst/k1aZH167orHDSkSQPJXcLWruHyazxkAB05qLlY7WjjupaAUneFTo4PNNMNdPUesAR2aw

VSUvAPQ7MuJa/S0HSn7MA+Y4w+mtU4ZWczbyn2X8Yxa36tz84LXJNdm6bhe3K7W8DdjGb2dt2nZnmSb8

7B670oCml7KIU7RFxk6Ll9zkDTVIm/LVJ+9UwlGdJ+
VfyJ2dw3WlygvEtGD8ULUhISYrq4hYil7r1EN8paCnJaF9vOOD5iG/Pxpek232WTJpxnOAnulq/fmdtg

MRnmhiE3ifEcr9P1yDF122Ruzl70NVEks8Ou5uLF4O3+VRI7pivql+uHM0xRn8TCXUqQhCauEqC6/GsQ

jy+T970US0LM44I3K2SlJw+4GHgDuaweCnsboYgXLB
VLDtYb0mPh+gV2Rbo/EQ6CPzXALFXSfVpw/r/1L5o+Cw8Cew5uf5BPz6f+zFM0wjqz1VhwJg7bBmr1bi

GKLFCVgjXcNQeR3WfQu/nnJ6V7Xt7ItZH60cq3qw1b4kOb63w3SkoEP6PEbiXxzBZNrpB4zS35QoA62g

zLfCKJykf/eYHrA+uhU1LARXiwhaNYY2p2H/wGgZdd
KMCzlo94h8ctZtXuMtzelh+CQUPiVyi/Q5g3qaidBrUQRUf/oU+4yeGQQgv69k/szxb8QBk74/XCacuz

BCV+K5S/uDwj4R3WpWoVcRESuQssq2+7A7Eue5Hn7cGvXhcOeBYi2oDcSEeHC+FX11wS+FAMew0z/nBE

UL7B0JIQiok74ZPu3jVz6jh4yX4IHdpiWwct2yKmys
f8jJPefqForfbFcAd3FqG5gtMr54K6sYm33bdlew5XrF64AGELwbbeuX3Z0Og6e3qhVQ8exHRTtUGg7m

rerg2nbW3mZBOK7ZxKaRJFyT98jN51hozFo7PKUqKosz4nBQv+cOxMYJIswmwq8oQYKNy0xykUVxb6Wt

31OAyvlYHdoQ4qeqbaOxCXW9z617xJjIJ0SHOy5pxl
w2SJ+ov5ETh+rWvTtQM+IZZvV8KqWqU+uj8fa6a5tm7lxsMvGS5hv6fq52YovvgoJM724Zh4eVKDFvDl

S4pMeluXUc9yf+ue8qPVvQx0ztyH9GTwtwQh5bgiZwrBa/D7/Bdde3IzPGitRz0rKMwwrQuHTISKhgJw

d7QwqfFKQk1MvshCH4Qx38iCHm+x6xYncO57NryRpS
69jeW3Jdj3SfXHMEOCswFUKOM3MTjtzw7Nr69768JUkS4uDYsv8p6VDVPDwmq/kbMZbpsULnMJaj2+tB

DEpHDA6f9oucnIM0GLHOms1qtr4tVSZdGtDJOyyfnZMe3a+OjpQo8SRkXvjvD/AuZN/U2LCBf1C6ypkf

veqGb453NfP7dSS3VXGcjEGHKGOWvP/+eCoWd7v+uX
rykVhzsX3xIT/rpwe+TiCnicgRK8vxArmMX80ZXuecJn9jbf3C+E+wDRwCIv00yT/Bpx8jvQIlEItB9z

LpEr3BbqTXL4zAeOq6dueAKvAAj2bphg//jBFHwYNxlTNR3mqCtWMUc1HuiEed9JdnREfODlJttMhfEM

2IE6fcHbgXQgFlio0vXvV4B4bVvwkAt0Y199pD1Fm6
2zdYiRFuSAHz3uDFT2nRHNaztq2xXXM+YgB1cUaTf2uwGvMGzt2GkVCj385dOvXCo/IIAY7uNttyrTul

t69DAJsg7/9vLC7XZDwijmlG9ho8R9DeBxqLgUPrQDRADjyG2kmN5OqaXuAyBX4imghmurMhy31TNKnj

gnv8QNAu/Nqf5p+N6AnZnrCegGqN/T7vSGW7TUr/az
RLyJjGkx3USxmuUzAYcvXpOyN24n4XgkO5GXkGJwlzJtxaUAtdd59ErndxJUJY7r2/ao5PUaWAL3sKnA

ZCgK2nTqKOJLykZHwEmCizGIb+rC+C8CZrhVdbCtj7Pcw8MoxTZx7p9D1tM6pju9gaB+TSrUtOW8IZSs

qJxbnyswYCodpRjmDh/ngbr+aRiau9QFF/bHqAxR0g
W87dBzUe9Jja1tKrDxJKPuay6Dqjwab8EloIVkH7u4ltkzjo/LCavT+w/Sq1HUMGI/LjmkGwWqgp7KkO

jLr4Ce8yDW8jHxNedhu16x+w5bw2cKqoI2N0eKRPAf9IfPo8TWoKhfKZl5TtgOjsrI8B/hsJPo2hp09x

0Y5pyU9cxLPtbsks/x/JF8dJl7lHhTKC40vBNbX9wE
b6SygR9oBR8MA4slcwO28Qc9N9tCt/vGjYw9AwO+ERNgxkCI9CcIpgAOgzxc3u63Da1yhu/i2UJPikjB

GwEWqbXHZTa63ot95p8IejwqsKOhWfUnFrcUGp3JCwmL8UnOBCMNT7i4gG/TvrVGNcKkwRPXgSTgZE/a

qqOMcvToWL+t+wlHCUctR/ewcSAGKa7sgTrlbyCwvQ
KJxmixSfFIFfIyfA6SO+e8tWL9f8fJOpLagVK5QyxdPlqgnEUV1dbFKem+NJtYW7pv15ijm/12l7HmB3

0Btv2cxmGgL5zh7E3wLJVxNAjID/cE2BII1sWP2wu0cXxM8fzxDq34pK9EMuP/cQM0WrsvyHmBjZZiNP

dwDGHNPAKbHKRizyTP33BlCXnRc9F/uZ96WT+ikwYn
kaIbSpPpzNe/FPDNT1cbSr3X/UORj+4zjmNi7u/2yHoWFcgGGAwwq1cai9iobyrxJ84IuSJGIQpqDpXj

7vQJH1Vpgg7+8NjyXVEH/peSYkY1oHJ+lktwiX+Y761sN+/gz7zqX2mWxeAlWy061P1/qKkYuBdxmXQO

sCEuydpJrhu6Sz4FUQyefJgzHWAO+MBEAyQe+Gf6qx
LO1OlH01cDyS8iHiKokIuowDeQti9PWgOFl8Aa+eNg6sDo/Bj6rofzqIuI+C63aemFgXLq9F+Hi2SWMM

pKcZjDbSOo4voilHeuHN0lFekPJ/v3ni4f/Af+yhoGpWMJ3hWts/9t/UpVQLxPU/WiR2O1MIp3/ABzw4

X/8PD8R+akZKKSBvzZZpSzgAv7JjZrfGI6DtmTZuX2
/URJ35FMVderS+/KUm9S0S8nQZ/Hpn1MP47fbmjlN7zea0o0DZXifSbGK/tMYoAklODLOoNPSvORHP3Z

kEQ/8SbRsPPTWVnGWwNl+WtEhcEBkoIkGxpNRCEGMnuHlAmna0p0+6AbmhF5CT/O4vl1tH2Iv4XKQKBL

vNDv4JebEZvuZYFvaTCM2At8QFj44/XKUuXu4bOqMx
TxXZRAdonwPWrK5g5IGJ9KVfaTDWgB5GWx5dNuq/w0wuBuNvAbmUJT823PoGRSXCTV/mt10huF8VmPFH

/bQyFvjfXlL8a+D2f2+90q8sUFC5dwPgbuf9Aon60XhxyuxM1b2gtAOBF4wQRRhUqLyN+1X/F9Q1T5/q

K7Ya4Ap/n1+U+l6Xa5RBHs5aaGY/Chi+uouIK+gXRNx
HuCRhYB7SAhpqpEOK7CH2cdAP9H5NNVWWWAUhCyy8H2nXY140iCKLebzith81o3rr4P2ti5hvLT9+Hf3

O0dc6sh/baq+kxCfDl6QaZsVEi/cHqnVN+mBwabqaocMMfSU1M3oo9JSaimP1upaz+CG0xtH4lHcclp3

hweeCt2rZc5NncbMZa4dmMstm6fxR8h3PWW9NeBUtf
FYUAdmGLckH/o1czhq9NBmAAFx6lGwyJNitjmWZEaxANoRtWsq5HCCHKVwcXIPEe65RkZpjLvSSkwmBG

R0tyLopx/dLWq4dJXuNKBxV+j2WOv/ATABgjH/qN1Se33q2N1KvHmfPXrAWNB4WhOsio9DZksUrAWHYE

2WfUUnc/JpfYe3RCP7OVD+bPUIXmhuIW+LO8HbqB51
9P8OQnO5C5FV1ivPXcg8B4b2juAVByxG1zWgb9J8yAmEhxp7RKOiTJ49Xr4MrCR/GK8PRoogWKf+/NAQ

4xO449aFwEdiTi1jQkrbmmj8XGLJCtjVO9EeYbhSlZkp5XcbkEDvkvLbV24pMmEiEhWclSnBiqF52q8W

N263EGiPozCeGub+nej3ZPO+fhQ231BGutrf4BvNBP
FIZtpVaT+jivFWUnNZxEsn7nZArM/67hLVlEiBnuw48fhqoYHWP3madeJirT/wYwNO9jAxRygb3z+Ksx

8G4pACLb9VHx4WfNMCpF30G9VUhLt+FPkBg9fC+mlVyq4OA080SwW2Z2FmCtlEoeK0Bq7i7X8SRBoyNF

Zu+8xn8eFyWP33rch2+AKPIcjyRhqwhtUsPmwFdAk4
5hJXNsDuawqkSFO1bFumQBKoksqhRtrTTld0HHlzo6/unPG8FLfugxsVYkenjFooRmsmJtZeG/PdRP/+

IPzExCd9RLLewy+ioz2gwzX4yBow7iEmFD+Lzm6FnXay73G7djxHFC9Wa7e3FXc1N60PBuBOeuwILvHg

l81Xqa0cAFyV+2iByLMmEd8mR7QJaNAQjL5s85Ohl8
g8CzjIiJTGjbRVaAbXNFbofT7xQBvCvnpvA7lPHzawxsnWG/uaoSSxxBY4Wtd5kVP6SJc9/s22Ixvuhd

73mNu++MSIoi00rKCYEr+BvMOzkWykQTgl+oJZ3igmWkFuKZJVKkoTJ+o540WGi7A0zBY9mTJqlIAyYA

tcKejdTeo5xYqZ4o6Nx0+zfJKjndZmN0lgmbpOXjrn
JngXHKZU8LfEpc6XcC129Etss12cYv2VYJQfrTohCLgOtvHSkSZyJXG+sA4c38cm5GF88Tk+u2lzGc5b

HhQIuC5lW5w8FGXvuMqRTXMt9mP+F8bH74eYa4dJMvFEANNyIiu9/TSUCMqv/ub9f6Om6v9aBgkM5EWc

sExFDxJ6CoJ3pJkFxf0iQprj6Rj7UR6NpsJ5+Vc9G7
iU4wUdVsGbgYr6zIQ87O2byekJEnXD8BH3ivylwsKunJYDbRr98Wg3K6sjUpeclMS4xKbKS8KBi6dj1t

0WkZMqR1afLf5nV+gjSVDI3j3MP3ccikzX/kTAL4M1yJG1usJ7l70BnnQFravSRt8er2icQ+ajRYH5CD

kcEit1g94oBdG/lM+X/YNqDrUHPL04G7ZgcCnTURvl
AQflUgLtXXoxqsVHWRm8mu9ydAnyc0OoFHZdVBgsYQu19MXJZksr7JZT2wpEsLOHJrekiJxcyXV+g+AX

OwCyE4sZmt2D75l6LjREk59vMbMZASbmkYuuAzy86W9coW38k++KAJ6pXj+mHSY1osgzD2WAokfSZLPz

XYJOa0YYOAp8nxR7y/TCThTJcsWdwQen7vqWksJO4n
rNkJukS3+W3ERn1m0uF/dmCF1ea7JYnwoe71kC95FesAADuY58MA0cuEcWIoEipMWSMnUZokKLvaVyQC

x5jwhKQc86doOtPQQED0Kax2633kJZS4g6f/b7xwilz1ATGIYkfF4xPfCbWXigFsdyZw3Fv3O5x90sZ1

pEb4DpONqwyWkTjXwwWkFVp2vAorUteYNYzTr1MI0X
1vmhzCflzR0V1ybGFRhv8E/BRuk0O/l0uLfBeXotv+V5VTFCT7JynH3PrCos2iqjxTiYRTfjj6Ga18BQ

NFC8nACFHEKh59VQilZ7CDZXouYkM/ddKW+6pT+p0ZmAwXgmBaVzOJenemGxI/mFJhLP5rTkabIEQNSk

CqkyFgcynBWungZBV5cs5zcNOeLgv/1WEBBTqDnGq8
QlLcvhZjfumGlhOqB2m2W+dz1AxHfxy+JnOkQjhms+HSYKau2BRQOEk0ac9096unuyk3IBxboIm78qTh

4h8fll9tdRt/Prs14WTlTBhoSWJqvi7tRKkoULhcS0+jtIeI9XhdCwYvgygPInz/mYui3X9p7tfRtwct

A2Fc7we6JUgV1Ey9BjLiMjxFD3b6qNGlgAnjyKC9iO
df0Y2DTTzlU9G/QPGLLoumDi2aO0mcWRel3YNzXq3DkrrpdWlE7zXhl2b+KppkQTb9EisqDK9gKIl5xq

Uf3YQXpjB7yWe5uZAa5EwXbIcukGPizuUq/+79a5KSysYmFIKq7qBR73XL4gMPFLY0v+PxXRlVbGqbk3

8WQtms4YAx23fhHazODEpv8ZrYHTRft24L6wLTPjv9
chndhkk6c5cAPT6b1LMoDtYdqT9L65IkoP/MVECKEfjUtTeHFVs6xZEnNsJVxQpy/l6qDKt/Zoodh7rW

+yMao7w3E89Jb6gPQYVEN/ZJJz8xvmSKrrtWurP4FYBZVCBpwBTOU9DMOhHthxAJkqiANRL3+vxjs8LE

/PTq5iCdofXkKiaHqdP2uHxukkJpvscvbNZro5oL9v
ZPVCZorPZSwTCzM1zDE6S5hWxjRp2loye2ku2FhXxLdS7lEMU44+EZ33HHr7SUfxgli+BpMQuAH6XKMs

WVOHhkoqgpDOwFETjTN6VNJ188Prw1vCyqbi6IXn2PpNZEl9nK/Qt5LVW+LYrPjz9lnZLrFvl89DU6mW

Ku9OkXbKda6UekZfHxoxroIx3u+7MxAac1moirSP18
KGpkW0yUxwQ+5OhSL9GNowg1Lim6tJ1qGKmxOLkCQD/zOXh0cUCybdK3D+8ohN9gnUgWh+opxVM1CJPC

xgurLVNQ6FUeUqPOTpTe9PhcvJzvlqBDxUHCBZ3u6/Nemorb4pipnmGwpYJ/QGRscfC2Wu/Lrir5yH9b

zye6dKL1lRGDNX8mRtU0EuOhN9oz998xeh0nsZ6jVt
C+Ha0tto/oygSibDe59+2ndhen63NqE3X4983gVqgsgO05qtsyKuwrFLd5cZ9IoLC6F6MTuyH0I+ckxB

1frwmaxMm/bAilUbss9cJ6WRMa9gNc72HTFwwsrQu6E8fkNLOaz0Oi6q78rl6V3Bo38MIS2HYQ+G3d2J

EhAtAUXiY2FAWwL+/yudFCECr44XcfYA+3kCWK68RB
Rmg+VXB5Y3d3owDACNk/M21aAyoXnvWEQOxZKaK/vebjSQ2akIpH0y3zWgvqzvciCpZtAlapjHCdzeTY

1yJqKcqjEymA4MnNejp+UYRYT/c38zLLrjt37BJn1MRXT4uSTJWe5k5iR/fzZY7sMAA0by/lYJ6mfmyq

UmTsi8e2Un2q+T84DQRHXeVPg7H2NZ95Wur/tHhGpQ
Ii+sZyWfQOMcZs9pyWxh91YqYPcqWQXD0e/u/DksSUJA4qbCI4M30io/vW4zohC/5ojso2JK93gpq1b2

Vc3v8/d0/4n/XcDQl9kiiVQvrUONKgO9ikEaa3VmDeXu2E6OM/yE3vXrz4hyisSQKElzi8ojK+DMiC//

G+rfajqdBEBUPm5v+4PT//t+8m4qlqyOq+CWQHl+ER
mfNFuoz3sVrSlN33nn/4jTW/1Caqt6884NcPnC16SxBufgm3H2uAe6xuV1spc3AjbhwEfElBaSY0jQt+

AkTk/g39/u+jsCgzI4SCkzu/N9WuU7fZyHTn8t5ViDKl3XXIAJ1h9JuvGaD3MA30whJ3qCkDzQAaG2DD

xSAzyHiBa8Na+cE53IqL+N3gq85b/9QnMDjL6+4HnM
6xdr1mGv222eG6NpmjcjDd6uW+x2N8Si6yc3Oz0YBzKIV0F0cVsdvyU8olhq+MPh2UhkjsYAa5OFQH/g

OAIH8D4Pl+jcX+QdhAWqtjp0ym24p5dxFnrAgwfakvrCOZq6S6pCZFssOxxcwvPdqFzdAfPJIh7tMbkr

p2VOIbEzjibHUQnFRWsubQAg1NENrQnq1XFz7Q2ZS/
CRl/E97+14TStkpBeHWxe+RabfJ64emc8im4mvOJqGO9+DC1OK+AT+ta/60s/q+TQsrBuiGwIIk6x4w+

dz1a2HChcaadRylRf37KBzdI4AoexeTae885fn6roQsVommmsh5WF7Jg4Xgke+x7yg8p/3T3/xor/tdY

08ub5E559E8GTipW9d24brMyd/60m5M5jIeEBIrq9H
8ficG/jHDf7bwcVTOWBlp2Z1VVFiUGKFMwTxS3rr1asM04ObKf3CgFz/oBIOHeUK7gaK9aOU3Db9WQfS

wcqC1jg18Xb3GxHumyvkqtXHo8XoGaWHgd1qpMMgEOlT+KP+w4225yeX9MnpfxefOTe9ZzjBE6yI8/cC

LFKsmQz+jQZjbB6PyX0ff5Q3845W2gRVpqCr2MKTIS
/udSLHoc/lvzXpJQWQmMwbV5DKY1ybREhTV9Pn7/jDB/95Cb8QZ6Rom5ImCS2tIQ7haqe+B6UiDk+ZKc

NV6BzJ6HmHyEzspqYaC2g7B9x6k3V7RjinJzcHSVsn0JWvzVOtFEGRr0WGFsDnoysmNyAkZTOeijr+bm

DvU1+pudFdHK7A2HkrT0VoNQhfbsV0BaIzi5qbKLNM
UJ4Zw5P8VedJhEcoB4zLqJTcasc1HA6+uBzKpItSDPrI26WmKXhE7ej3xlg7fgjmSTkDDYA1uVOHGTNc

t/ID1IFW9dxWDyLQMyzgFen3Ed3oLHJTxGUzC9ev6tfCY7AQNx53gkussqFUk/pC3FuAjTbfkFyaAg/G

LiG9UhhH3nWFAy+K3MKOLC/QRYb+cSz9j7M5gjmD1G
8d+/QiybLc68S6gY+BZyM25XMhSE/IYP1il8JN4/T3KSDAA51mm6Zchez7b/hveNfBrvYG0hMiUtDsDu

2hAfjt/0ZEcWFm6E7ph77mbim+NiJ9Bb4Q1zmb5CUSlcURoswOltppgjKixm0+dF/14GV8fRLCjog587

6VEhiGkd6gyuVcTT1Nwm2R1u76FukPAlfutm5V435/
jbuWRWC7S21IoGeaXQY3WAjh/syz/V5rVmnVQCErNRaZKh1waRprry14+EmxnlEzoFWqPKkmcoMx6mbf

MgfqpcMKLsK7N1/1/Uztoc8B0bsFzE7tWhaSRSAslPWYdNoBtr+G+k1a4Bz+P/WXRuEy1IsndwpuRKit

p0poZX+W8JTQdwAETxPuk9QgPndW69Odupa9Ddb/oz
ujoCmCrVn5/cMWk1BfWHyuI+XRdErIamlqtSGkrvBN3tJMNaxhF73/73EiPxAl5KWnv3sDD/f7GR00gF

OsXaVPzisgFNh6uZmEmsXvDMVO1U163lkP3RjvaV4G8KKSDFSxSVKnBTU5iWok7xi/q7JNUoB5CBsFL6

mpHqTqBqhXok0X/VdztNk768mlG7tglvwbSgUp78a2
5KAFmJYFkmhh3prKiMnfKSgJiWKp7goXUGaPG+mKQRtjaykEeQKNE+Y8Aey3LwfJy50g3ery5lm4xorN

swjzi/PpczQ/EHvvs6v2LBbxRUD2BrzfehZf0pxPndiiqvBdhLr8VNy4Qr6oxOflU3OYx1IA9oKX6Z4G

V7ySes88/esJVr0WIfAYzUQJeluWs5DhrbNxqjqDrv
rKO+MAIpisVWr8sJo3vZSZ2w1Wsqj1PhBW6vv1yWGHeY7Bfxdb5sXJV4YbfSJ52vLSI1uJh8inRPsO4k

HvHPAtWD3eVj1fce8dMGa4ip1hw+NrWyHbtEKLnaE2VEcPdgP6bQ0gyLrevhJbwxPydoTRCKmWjpxVle

g5IP86TaPZ2OL30DIS49LBbyQxvREWh3IVJdxHeeuL
KD+e80o+X+CnOVc70Va9bCreEgiqrnu4tBXbGt06n1evO+JBytvp2s84FW3eY9T311IGA2mL8JzXodIn

up1nQEBNNcZDjlKvjSLFe1XLux1GkTJONJTZM1QAK3oQJSX9ye9P2u8uvG37+5rvvoNuymfumRu169nT

Ifcmpmu1biZKuRy3z7hiJVfHWFIoXuI0DxmR5YY1QC
71bnBiKXnL7nOa7TB9laJO/VVu5vTmYpAvxwv4yd6+N2LCJ9n21fc+l8fP7ax0uKltMVTBq4KfEqvH4G

oG9EzJ/uFudvJNJtQlqVh6iV0IdIcrCYL5108DYY5ER7Yr5zbnqDkuc6WtbDPuHFWEK7ulDtOo/dLGAV

uo2YDA1z68pOkR0Hx88egPAzgr1MQMgrV/atbgRNfN
4GyDgBx0SSuagQ/mDmK9XNcUjrKPRvIwhPywhjPaJmi8SvFuiW9qkzlvU0RV0qF6N28sElMhP8DbD4Lv

b9lIOOJN/agz1rxYrd0sWIpdT7jQd9cripr0hQQpGVxcA60sLyKNsZ3McAYPKSfmMgC+N+8C0geuK2Yt

mNF9wdVeGHAKPlWGGebeyVW6u0rmMVbCaVUQ+uCmII
YN2Vr0A1QRaP8v5rLsI16zcWmRjdCeBL0zFN6ueSSWmBNfE2xs8B/XbuFnBXXnc0FZyBBBL1L8eb0JXX

h6jdqwGVB01zmt28OXnf8ph/faKRXBEBvjPIlaQm1Pu2fSSwn48yltZDA7f864WudqzSM04624EBgoi8

UuqP98iiihAh4rY2ztzGls3plV0G6TMoYN1gR6qAlV
kmWwCaeJne7wwdLet1LEreUeC3Scsa2zjBRuaY8BbVEog1TBK2teaXWBO8L3FODJ5PaqTh6AtMbpVqjS

6H+Mh3AFpbGnLdRs8pOPRuUtFWnuDmAMrEgwm8fUyrM/r4wmJA0Vwz3dhbhquvkK5UAzi/U5SMsardIV

v3nsce+OJqyRUiPblwZLshIaDpiuTJpKKXF1o8Ikza
Xd4xu66mrWmqapFOE3ncjQiOjg0bRiJjJf6hYtjMf1n+1ogi6KiwKZzRYUoz0+i6hn3uNL1VI5+q3ajB

dicS7uEgksw/QqsOUp65PP9o7KtDVY50LwxdLykSLSqlemdOE4f/EmTUIOgnW9BomtN5CX15xivsVrYn

6UlFSodQCNF/FbwCqqv6G5KfDw27ge85yPM3WjXYwv
WOqnFj7xdlQ0Jdd7mfRn0TJ2pCT9Rhkr4nKcmG4H2AiznlIO0DFyvIn/auV75XJtPzkJ7zy7z+drkbtW

T7FNH8NV+wPYSqdgKb+OPkvIFZjfQtY6JUVV3XEiPHrzvkyfWBuiCu8ruD7DflKECz7aQ900P8nAD5dl

gUkpDntLmtsWnreeU8vWUI6NMpIg/N6jtmvkglciWf
wIEP5rXq64RCIWCezSWPMr6hlbLun5KKcvqrTdaoJGmC85Zgu8HjYkeMnYDv0kXo8RvLHP/ejMwj8P1c

VMANhCJbaqW2kBTRita+XepY0/MVgK0Kc3QQ3MxEJTl+5/APMR7hmT/rNfVunXFg4m2yueG8pi/eRrg2

UHGroQCMqs70GtnvOc3ujTfVdO6h9b9AZgsfA9GdsM
3LK2HBZ+CLX94NFkQEK1rgf9JL1N/vHnsQPt5HOyLmq6TO/aZFKLBslDZyuTWsa1lYYE+ELvthrJ6TUC

kv5sDnHHlcYKVnjDe2HSm5dBILSWlezuvNAg7s29OWCNycUrcTzdxsW4AHUgr5I2Re0o+p+PVj20UTv3

wkdTSZmu2Q4GlnLl/qU4/DyUW1zkWaQ4if6vdDG4zU
Q69cDHfVuSQVTPlhZ0m6+CAjplQ1wmyKYBzuIumbjIoF0TBD4d3mvselhw++c3ndMej0Jgh/XzRzn6nE

Nu6m3D72yEH+9dtT6wBkaQ2SG0c3XKmJ52/hf08oLJ+bTqFtOborN4A0OCBM6ypZkjCtTRi+1ESbewKY

wgFCeMFLbXc9mQjqMlZya3azY1TwnDjmSdMwOxby2x
KJ72RotpKBeDczj34izPRfkWWz+9Fl6Qkl6ISp7mMtfANMBQ5ad3J0XVD/uzsAgn0XUo+nGWqyNDmcr8

RvycicmuHr7rkp7hrlbloW+Oz3LVfNdMhe4UlJtWGwouXf23AG6ChK5y1S6ixCZXTqsEmm720dx12opT

X5/OviXABw12iogZEFfPY7PJGw6fbxZ7oJVQfLTxUC
9Gn5lix72X/JxbxbapO3eKgvaQA/ZjzwyoUwkmqhySfhOerJlbtlS5xlpcV8oa0Q62ZadcDLDsm5gdpz

4K4VIowyrcPMiMi2nxBl79N2UccWx12Sjuzr/A7V/JYnBcFOCi+oTN000oh/yHcc+jbmYLbLpAolMhhd

3WHWZ0F6RiHgt3ToF8tuBmS5LiFyhtfi7u56SALFrO
fGiELaRj6MHXl480fn/KE2t4dtq2KNNTxcRIHxQHWKjKtKeHhIxPTdqT8h8xoOLHFkl+JcMjFeBLznhR

ks1L/Q2MDj56GPLuO1zAp7T5PKmWO7vg9/fo90iK/4j9Kzs99dtX/Ne7orj/gBUYqYQF9KR2htThHe76

InbUAqFmIQ6NmjlKEUt7DVV3c9yIXO7E6plfDboScd
8tLNNADmoEfJDKEQLG3EhhNqDiMMQzHsDJpC/xNP3huFa1atctHYHL6oY5BKBrRw2NIVyeChAaG8g8Hr

0yhNMVglSZm9YMocvFF8c2axOaDD95fgiPDKCkXa8UCDMNmvHY0OQGX5FECiLcw/WTrpaYhy+zVipP7V

Rchh3wwDXLy6oBowG/6X2yP0FuCQblTEq4sjLkJKHJ
/Keon4Ckw0lzjcZgIt+DW9o9R31uhh1XteS04d1OaIDAm79H032ZWB+ueCU9NvpLNm/8X9Dm74Qujf3h

uQcUtbZHPjNrEld5gq2gtL9kyLG/PTEZN5XY0dfWUrIfWw42t90kKX/VOWyw7YT7Tiy936Wa3dziG3Rp

inoxPaUhgLR9E/rMDdgXcxUewrIlt4O21bjh1ynQkn
RCEzqofnMTg5QI+HfyJXqh0o6ZyvkVoof1PIEuUKI6sjD/KTXAi6CcBcpL/E59rrgiD51uapfjOgS/PH

f/Fe5R8RDW6IXvG9BDBaJbkihAO2PKslLem7a+C58fNp2rmkcZx4ZCIq6nMFqvjxQL0/0lcB6zyUaEYh

YYvsonRINUVdIgCuiAc4J9uMKJmZbxh76h56x4GeIQ
+j4Csyw5mIgPs69R3JOSkvOIX37m72WaQeCcfpYx4WuFWIlTQTRtfr2tv57EHKbJ7jiCqnpVjXMbi41x

m7XMrNzrRoTAkXA1tFZFMyoso7zBG1GV+KfrxC7Voq0XJRkrS3lHqoeI1YnnNHYpwRZBXgtRC8x4VVGK

OyjtJBbHAvd6DeF/Ma+R6ZUHkNEjQSe0RvUUnVvls4
9ygvqMr8tcGwGyMSU3sWl65gP9v3+b5z+h1xj0zEYUhaPx9Cplkp84X5vmtMoVBNz6PBKSuS32OLZTsX

NQ6RnF+D3abubgyx4fwFNrKwn2vlIH5eoBjffb6SWXXtyICcPa6MvvLgQS5lOXa/Hq+JoIt69Vy1QSLN

GjFA076JgM8q7Xz5OomG2e01AI94XB/YBJ3Fuk6KVS
vgZBcy8URc/PV1XHBBXb0hJW/lAbu8gdRuz3tDP9VqWvj2UPTzvV4rphtDRlmntOBkvHw+fzFv1irc99

j8EpzIgEryjlWoY54742CTQOkVlpXI1btwAElB6Bm9kWc4q8IA4FuxiZttmhfu7nVoM8glP+dKIxMvSD

4g+5fET76yZl4vN8J6rE5f6+E11UbaZHmAPdrgHsHC
0g39Xe9JWxRdoBvW+P6t6hg0lNo62oslB7ELu4At6olLQvVWxtOqtMAdij9dFlgy1gZntJd6De8PQwIt

B2YcCelE3lMRO8KdhADxer135j1tcPpHzukVN8EsfnpkPhrXlAXib6GTABiBUkhiIF8i+dNNKCqS5dl7

ANeG9Ld25WgNDkqVbgWN7/Y7RhH+7DhP7PzbSUNdhy
dvs51vJzzbaNchBV29N0+iTj3giQmSiJa06XzF+cJTz53ILlxmOh8ri/QjwkD3kde3xx43//ut+GmK97

oWOfuiNI+RljpLQRqQGWE059rvtUtor1YBsprOXih0J1IaPMYrwt3zIwdnnxB+9Ky0Ovu5YeTih8J91d

dafw2h2L6h9hW3w++UEcWMMJMiY0Ee9CWX/ziDw92W
Xd8/RLjwQV45z+qDqUv9rJI/nwBY8O/peJHYcEHvDyX3iNJfVWNJ41ctrp8i3AubfKEcgfNyjH9Klfif

fXnv6hpEJ+1lBsFiVViMq0EUVzcGdGCC4OFfA/hlQfaA4w0qutJ8ltmcVS0h4d3yvdtVhDootT9N/Dewey

OPDTLe0MgS4HroP+J1xUnSghfygImT5NZQNzt88Orj
gEVC/z89+WCakGT8ndVhzDt3iGotGIJc3jI7w7MdlMtrlt/sZnqWEbpLkw19zrxbk/n4Ur4hllo3UVal

XFFiEOBi3LxH8Kv3nnEwS5BGGrsR8djwRz2YFBjV22/A8tkIUFjK2xNAtr7fKvAKMT5j6LTRf5bIUMYJ

9rD5Ez4NLObfhomtJtE7dU5MSDrwjSkI0i6G83qURy
DNrGp15nCWS0ajFvrZyi0KzilO6X7c9PQ5piSEmcrW20C7KUcFbem6LeWq5L1+ft+SXq4cK39YveTaUJ

GbuwSmeIuyF+CDqOuGbKfuHoNXZ8o7ij66GEfomYFznHi5Ootl48JahX9KMVXIbucfIdxXF09dlsXQQg

ly8c3b0qXQeWg9QSw3Q5paHQafrH+/O3D+niwslfMd
2B1pdWeUpFgJIoPLu6JiWMHwnmvU9IjVVH0qXC2As2HPlT5FJy/SHa85TGRBntF9HaxHAMPRnvbDYxZj

tESmqOvpZvy75fg54lQmNspGZh1ANjWG/xhnWYzBqT/EyGLiAxZoDU0+waxz9TAMpIC94Bej29scvH91

zTh5M2R6Jmfvlmm3RILeBRphmb8kwfUqI2ALWEofn2
lmBAB9Na0OdY8fPBIxpp4JfS0w8JIijNwteURPlyVkkhY1w7Fmbv5JTVDj1oi8I2bYPdY4+8zuiMYSMR

SYsEFaq/btZUM7jQLxWTjDdM1IMBIOooKJNEhJ2EIBRT4+yIEl5gfRcISPilzbzFBGryw4B21JPROtOP

3GwOoC1QJRG/0Xpi745il28TtOs4fIZ2fhX2b/XR29
3fKi4L312blOimpuH8/Xwn8DB0APAstdfrckERi26z8Bqjf8LY76AuwSM271xDyti6YqFtDy9TIjO09r

09JKdkSqf3DplD+tD61YKzOMjyJpJmqbIUtNLDBeVLW05x/yM8OIbg0ByMKC74/ch0Z7Ms3yH11b0zMZ

ctlR6b8oFDOetNETxG8w3B6fOebBI1jGCGklOMokUx
lrCrtP4AfcvLXBBfTwSHx7E8kIajZVKeWhT27muwu8epde6X/4U0p1CIgLqBfm8Z9xzXb87XUSg9nmDv

qraxQqcYXLOaIbo8nZ+BwLAhfalW4XTefXvZ7SEJRXsks7P+mQOG9hnXb56YDloJHMV4o6QmrUYdxtcX

JKbv0aluuoqRWo91sbOlj+arMq9kGQ6AJmeXzqqxf8
dk2hM8WJFBVaCux0cAKVlkhEfdmrr+Vs+fzLFoJzb6Ehk8CtMQ+Wmyyh+gVy23VGuA/T517NNfaMmFV5

mlNvGxOyxychEI3lgAerg1gUBUnmdVRbSpheUoVHUE3XvtTXn8iAKuvYGvgcO+hg1o06eApowYBmBjlK

Sgi70guhGGSPTAxWQJTcLTdJr7jewa4O4sP06Travj
npy3OUIqsVvab62quuely77ISGsEn9j36vhMlJwVCHQBlzri+Gb/pOy59/X1m1Ti/fcqzMme1tb897Tz

H4vl6L7qYo/7SwLUlOStnSP4XFa8kol3AujVBR0FZbDWqdXkViC1MGMEMNCX64iX6VUpE72J3lAoXhEP

pNt5x+o37Mtkom7O3xB0TSXh5thTbiRLgGOEQwIXhd
K3vvjzn9GZFAGmTpl/5VBe1E2VlWTgnCP9ATSuYt5vVwQthgiB+3PAYk4q22t9kxW5t31lZOME8hHuKX

v1tj1iICeegQmP/IYpnh8+vo7sQ9XhZ3PBeH40pRqiRWhGd0doPLBX+xSLU6VyVIdmpDLTgLnsw2u71M

5OME0/TdP8MzCdQWs6vBfw26vcJQoAVlk0jtvmZ4LO
QddTLSnRBTyMmefCdjn0sNFg29XT3YZQZug/609BfT/vrTpeqyqDBHRMuU5ddtMPn5BNlkDSskHYVl/9

hMwvVSn8/v8ob+War+sLXw5tPodgj3woysANpbYJ2jaZ9M83jAUvWoKn+08bGA556ts+3y6tpqIC3hlP

mBhdTjUx1HodPgAxZL2VLVEYpcocAHqN4PUjPcqh1H
bFb1PyC2yR5s1R+Z7fPkPyKIF3RXDALQqxdheg3CLuCjuI+WybGr7w7PbWVcdu2g/ZLozxHopIXE3CAV

NHObvATIhOFqJp6cykBI/pX73ln7wgV9bSLJnXGHjoHxT08+WhAqjmirCsWrGFiQezX0CL/DgZejEaeB

/ZbrxfMIJoDNXVtUzNy9ikX/7+bzob5fmxh6O743Jn
BFEnZtwbGRr3pKW9WXuZlhY2aIE/EEdp6IbcbnCqR/qNNChti8UqoZq08rYlXPmzvdF2YKvliu4gWw4E

r8ZGJ3WB1G+OvlaG3/wW/aP90tr3Ti0XkJY6NYFTkc66cNVBJNuW9pdiCjf6wJwsSG1MPkB3tCclBxh+

fmnHqmZTsbnqHY4f+lTJtPWX01Gl7wnBM1tbyR8YTp
779acM8Kzx6fAXPjmfR5ZwdTp0jgN42m/sd/qWrN4zArTBiwIxc+jCEiYmDP7+/COozm385mBxvCip/Y

k7NcEPKYK8/nsT0rwzJF7jqyTFuse3jv9kebThu/NvYo8059xb47Lp3+r/WN7qQiTr3b8bu6co/Rp34v

h6zHhKaeUuIt4oEzzf+NPUQYnsnM2r+4d0liyPOTJg
mOk7YDwFjnyZ6SLkyGu5lou7WUHoOzxuY1F2ViKMSOmp7ps3PMQAIjiUy3HLr4k5plS++5yd5iF9kaVt

5+KE5pRMhcRHiKjmbmxEr3+g2yyS7eZedlVB/BW2UYWp5oif7wswVPu7ooS7RdyLqqooxGB4TmNfyQn2

rZzXhSvVAA6nYU6bVVCJpObmIIWfMqO2XftePpPlYU
VPZqoNXwvaALJ3Qyk0XnNICGSsD8dMdqKVDT+CQD4UKLzT8DLEYLs240f4yHDR5VYBUexi48ngrpkvc0

GcBlyJPJe9Y6x2F89elUI3LfMLj/q2NtnaDK1KMAwpLO2+E8be6ZD5vDaT9KjZYVvy8l09pjGBedqK1F

VCbgh6Q5zH0oFkc+KZT0cVYkyRpDonWAkHkdJjkI/W
018NcIbDhQggEZ9SfXWY2bSp9BjUh0fo49TSOOEkzFim9l+T3dWE4KpYgnYi95IoTbsngCPG8vEOmH54

84C1N3WS0Ce2/hXbd4/l1J2JDW+68MhYAY0oAwZPh4Nd5Zgo4m91adAIG786bVM2eo7a9alQM5Kq7wtZ

1ewx7UrA1IZKCyaKcrM4uOvbDGlYFU3XhJiYeQAofm
5KODnmzKUKqrwNa/kRC5zBKNNOWEkKonpnTQO09utFik3SK3O5hhm8bhkZrDnwcw5aSO1zEOOT4Ec9ze

6i1RK0bQ7XTEtXMsT8igHFQ5a3U6NKbUGVT89V1+THfCjdmvN1g4BKeFpmCcoK2aebu2imRqewRC2czb

rwRRpktf0iSSR0kTrwX28YaeFRnTMUX5ibzN+VDS3/
I3SNBdnjD7Fn1hQumXBvbzyVB87WzkRRaRnEV9rQTrRf6+N8LV2W2hBZd999LsNZDGU6LwXbWMnd1t9I

TIxYfQEtIa12V8/Y2bkn83l4qSf86zSzJjvdn5VvXcI/h4msfpKm88lF/WTk997E+NCIxIRJdLRDVBqN

e4BONQubFn0PiXtizzK5ieM3Vt+OvRux0iCJJiPqe7
bvXG8yyty5cSHA+8zTGxZDiWf30DpJIZORmJL+eucFopigF4KiYQIctQtS+ZFfRftehlHz5tETpaLywA

y0eP0pmY1CqRiuCOv6gQX2leFDrKh7BDhf9enpQtiV26EMVCGKCSoK5AWQThRZxQHUjqhszHakbeH2ma

9+sBry7tcAILQEtTcWzxslIw9K3e8I2UOozi8clo3F
H0M7bWNNBWd1nQyBNxquiub03KYTVpASfUKFnPYushuxoNbl6iTS/XB4ZfbIV48dhdko+uahLj5OLMy1

c7JlD38uB/lzkJ0RmYOOdI1D0Qb8mxr/MoRykHof2CAZBrQFrFrv+ywzpBHYS6u+yNPvg9Ht556VaYsB

Q2Fd1HmP+kJyvAeJ88aTTgFXHuaHnZ95tExEQEisnL
FuS18PeM+7y+e0OieBeJY8r/h51bLiQvO8xw2WzDR0G96hmPG89+J66HGAbWAVuIptNNFmQyvcSpqIQ/

/CxQNDqt7h+jjDVP9KqZmD1Evabojax6kHd2E45aqA098FOSPAziezjWoa1eay8bVp8FIw2w7jz2hN2o

Ttfkzee9hMvRkXzOHJs2WedWzuqw78Qh9D6pRstXV8
mOhG8JuhQRgIqivlFweONQJFF1bfrVt1PIhBFrA8TyB7Ja+oUNMTVpiOluTrNJ82ZKcZM8UjKhFqT7a4

Q06dqpdMIPxuW/xtAYB/z8jsBVcMNBgJESlPQUpMWRbYXSfWUEqHTBgPGPhRNTkRWQeSFNxHMSuKYEtP

CAgICAgICAgICAgICAgICAgICAgICAgICAgICAgICA
gICAgICAgICAgICAgICAgI/0/vY5wsM5jEuKqWx0M2iOV8kxjYHyvrPSl0/OFRvNY1vBriR4mHqSEnPj

8bNhHW6tqZlOJVqz9qxxhGb+S1XoRat1r4jAffSG+eIVn3XKHG1L8VNt+Nv8q3ok5s7CsRwX2vfhL3Na

zZa832PqUePNjxkeMk+9lGB8ismaxdiX0QF8EL3bZ8
Al994G75J+mOa66WREuiQOnxlI/9w9EppLHIHk5AyKQOPt29kv98XmzPEVtSC2jRX1hq6UUjKOKsEytG

y2ruupbGhJ1bIo7ZIMwd6TZLmwwo/TAO1+R7ECAsGqVUt40ntknC+mcTXP94q/qERqI1OjuejBIg3wMm

84Go20Bnh9LZviRIqq03tk0exzKP3RXaws6j7JP7fu
RuwIGqNGlh1eoIIwJnf0qjIID7Jum/OXKPy7vR7hbCaJqjbvp7p8XrgNWOYfGdEXZHYL+rDh1rQ010rj

qgzyJYIOJ2n3BBGwZrDvI31YcngnxQj99QqJ+OPdxnrq1WHAjZNDOKzIZ6ZmNb6nm8ytVKJ8LqKXCawI

hCXNQFwfLcKncN33q0Nnb7pLfLn64DcrTRQYJD4OZD
4IAGBndwou0YVCh0KXzIGYWbDov6Lw6UWOM6J1qjTT2X68ix8Bq0WFPnJ2tICJ+qNmC2MxpCdInHMFj0

ojbj0VxH9E/B2a+6E9m0Dgwwjzh7IGUCC4LgcJLnP+Ba1Xs2cyLJhHtO8rYtIrBJ6j0I9YbZXdLCMYCe

h6FErI4ATWhnDg2ZAPDQTiHHrtrbEL6OkilQXsvewl
rlUL/97IJiWfpjzPipf5g3CkinSt9FhTqYxOhkESGIapfvyp0vMSW6nDXOts2q8IHj0lUiXPBsFWIcf3

LgNS/1L6c8x+QyLi6b4gVPywPdq/mA1s//yUGu1T2hM9Lk9rL4X8obiALCTbqRG4pYX40JKr1PLR+Eue

pOIVD0SvpC3BMv1UbBcsCWuwOkbA9t9WT7d62gR6zb
b0wHdePS6rkuHzs4bD3Zdh5mnQ6l6jKHYf9Q4rFMmZoZ9ERODHdIMtixcuK495X/wBKDthvQ+knG/3TN

d66CLMWLltlkTukx+6UBg2bmFKv8i91FowyN1KW4x/q4Z/j9rUvoUGEq2U/ItoztCSQO/UqaSEfoM36N

yzlyW7gklbqt6fAuRyaScro8q8xck5goWEuvjRn0hH
WY61T9WWtjJ/lqrB4z4YS1LZ7zsX28CJmU7bIZF8tvmH01+S/8852UzHERgBoiGwCPFs5C6rEZtaAIBz

/JImIhZwdcawADHW8gLLqzXg612sQYg912djtvIStIrMKFgaAc7g2L186yJmYaZt9P+JWzq60T5uw

DF9+hXvE/FQjA5chwFv9HokMoxkMU9WwQANy6TfuzF
x7t8v7N2kTo7GhcNZHpgp1S7+yaRu70215dB57FspNCj05AEyazMDq2UtVoEjKrGQWMHkmCm2UnBFZ9t

qieH1yos7j3C0udTNDzlc2FxFjCUOdg69hSw9dUcmk6jQRdvJ2PSHeX8w0k8wIaAybQQ5BkcxqeD4j5i

X6J41jKaJJJa+CYV5tqBV7ljz1RXqku0dsdETQp8Au
UlVbkdggwPfXD5NPZ9vxR582zBUbd1i03DG2l0/HKaXcxYcutfssZ7PluVgkRC8G9mBg6Vg8EptiSNTW

KlU10KLZgDvZqI9SWrzzO3XcHRV4L6404bVt/wZM+VOenumgLc759PfYiOBWj1n5rMNNBVjpN8pcBJlP

MhgsWxMG4meiLRM0a/SKGdxQGdcfhIfM7f5UVU98Dt
WnBo3QEwnCdcI8UMUiZqyN9V8NzUuWNLiLrnInIkqeS1dt7jiDyuaI+nVSv7Wvmy3P7NG5EIKVw8uW/N

LAy6NzlClSHR5WdgEpk9R/xYaD6FnA1/0yaZWiQDBbhDpLgvLcPFKCK8c/D2J8rF3d1Gw7PqM6y16Ptc

uA7omxhwWohFXFFLR6RHGtlNpw9pkqeIJQZOr+nRMh
dL7hXkcJ3475EE4/tvE9lG6Me6rPWf+E12KB9I7ftbc42zms0KYt8VChRoe24qYGljatakFrCkO7Ew08

kz8+6Ob9NPhtquVAd6LoITxoMgAd5JjaaCXufeMn/GCFQ3XeP+7HgHBwQlwiG348j34pmtfCDl1belQt

b1pmpYKEtosE+nd0RLQWDwCzRNg9ATs9WKb5nXpcX6
ltBtnqPeP1oMedaecqV6TG60smZNl5n00xIMAU9q3xB5NIx4UG/8Glw+zAmRSpAEt6UfCznu0HZ8QT8v

3CqCsIqGAu2Sj6UaHb1LSsN31glUpVc63AqibNVwdl9sTNLjIqLrEK5H2DDkdhCOOWaddhR2/zVoZEhk

MukvEuKdnwAiir31Xmg8Ml8IrjhXdiLWYgBlaydyTO
qzeMvDfNZCL+DHGvN6HLwPrOYQQ3WIhLayaq1iDdhkSoi9aasdIZswFk7kHnGCH73aL2TJOI86DgnuFn

wjH7s7qItwQ7bJGLtw2gOfAK6K2w8xCkBrukNvVaYMhm9qU+Yea7PaDv71u9dR23wRn2+JmRZ8hDzHrg

y3GUv7539RCKVSaOi9rOruo/nij5ZzbYIycWhVsOwZ
Kk8JeXjvBgShgLwJR87OCFt7PO4lztEHI1RyevPsW57kITe/MKC7k6lwKBYZW63+DaFle9eNWeP9TvP5

HkB3hPjRXblwBARUVXj8Le2HoE13MuWXskix2SMYwnMau5ZjHeFyyQUOSZJoUzzS+Q1eC/8jm/7DYTE8

H7Nx3mGzU3enl9DKNfZF/CZ2D8v+CvPdCW8EZrYDHO
JrPg7GMQ/68LVkhUdFszlhDMssCf362nf0yghdhn03uSy/btzjt20OFXX478sf+kOqJ0lOb+ugReSmTw

hkn2kSjnvMbRCrfHtVyQ3KA3r5WaQlpz+CSsF/bkwZJOhY263OrmyExzJv8Dpdb06xL/mOOxC/cOKBc4

4ZJ58gMaHWiaQRx/XifaKS/m69cGUsBqQHJ/AnaiTE
OoRQp4m5uwQkgrgkKo3hxjzWdj93gRMz9nBEufBgAh/GUdOIBGrM2j/YgSxpDEONHQHDLrV7H7i82Pno

DhQCgZJtS/O656QJExHSGzQFTW+wld3xee0LnEKA+cjDI6zt1nKXTueVYyKLweFLYPJ4W/SzNtrouamw

rJo/mxAmD+ORqzJD5Mqxd9cme84wocYoh+nBPoCqHu
VYcAJTvbXFWmpYKDQMx6Eimq7KAQolMed50nvhY47/Ooirxh7nz5A7mF4VQk5Lvg81X2sPuSCBRKLeAG

lUkp2WV2KywIWAausj1/zOmVj41/V6FS9ELRouzlCrNi3zjDwY6dZ2nwZ1zrczCvu2fgbnALLIA0aj9O

eLjlpP3MoKm7h1HqDm1yglapxgEBDiPlLnAFO25arq
Y1AufNiJDF/NreAEY5wtScxKcX1vGZbJbO1dQ5Fnv+pKxU05Iz+fmoxNI3fFr5rQ1OIjE8cNCKmtrL6G

7CgheTyQ7lZEvnwgfFek3oRmj3F/FtxnwxOeGhewkaGNzTaOT4Na2VAxFSCbSungELMVjlM+cvfh/P4j

nUtYw+nRnY30U04dlRTfZm0cefWDSkLu6nHhWOuym0
R5Z2qUG0fshyfRoCDYfPEXx6nPGcpSg/euS3itrr/l7xAGebIzR5posdh7Yp3oY7KQX91pRjayUVAGM+

4GoqWBGe9YxXsjpj1qe/I0i5bjWmt/e5idKBWVcjDgnHMS3U4JBr1AA3TruLg5ADazsohzYCmZFp5x+r

2nCNLTB7J9XJxD3PDega1xEH6PICoMOIP8NAi/x78J
JxirRqTfUO0BuzI539CAevNv/w2Tl69U1VPB6pdxqy+vvCLxIB3UeaQ81aIEZj0F5SXBa565DjesxJHs

tYiAb2FxqjpbaHqTUJpe+fpviTVcDbJVw0idpt8MPvRSDSJy1okFzCeTNwWMre0JzdBfmAfb+M2jFj+g

EKtyMwXyZl9mg/Za9l598ioK8a0d2oWurZp9Vx9Iu6
59XIwTXEUwnfrIuOYaVxZ7GgrOy+NNcceLa/h7pCrNxQZiQ2DWM78QZas5fPoHndOCr2oNt1u+5Ta83f

glL8TD1XW8jq6in71HStyXQuaee2A+C715WaE4DeWGQoMqkO1NpQdha26TpzFov88kmMZq1ikcOP9+CR

XAWhMUDPMs43xQZsrfnH146RCUFniNUOOqCauewYh/
O+NLDxeyzVTt57wdQWn86xcEKdTwmP8F9BR/OLwXA6tn4piTIfiB2833rz1BRozZiBV90CPKJt7dw1G4

afKkwtmnSjc8kEgi2EKKM9ntwj275lhSixCc6nX2Z3u/WxGvvfbUy9NsRfHWQDel+vms6YmsQCtUpvx+

MAFmIARolGHxE2xIDi1P3y9jHCro4UyAiRAU6EXIZC
xGwXoey0xIooldgU6yenX3yj/hmKsRydqC1Y4ffrHeWg7t+1dlwZKFpZMdjrbBD55FkUm/cKodGRjGUd

iNGIOpj+RzHapH6MRf7cgJH5OXVy18D4/5ghX3dg5r/f8014xb2k9Qpqw/dtbkDqPDCPN1Ck1f3Zus0F

THWNhF3xSW09xbbescWlil+T8AXDzGRcFHi9q9G7GO
qdaOicQyYRH1ffzfKxBykwv4qCxIVDSyUDDxSfxHo4jFcfU8TVjUYWIfWifZPjr9qzEqqH1U814uTeoO

LtpLoeZTJt8tsOtgWe/pIARc1d1kcfs8Nswibfdc1ZlBA8pKO+2mHQ77fb6y/fz91wo7YZQJObK1Tj7k

DTr/aBUjIwqqO31psv3zpmX3c3t5GGrq5aGoS/9ITY
QuD2+68tDFuYd99Zjb5wz+MHufDW0aSifE4Y01WTCin3n10u93xZrwHj+uY8nFMqyWaodBQSfH0s5Mmc

HXln9oiQCcssNwMfnjg4Rkb1SQKhQCquKugd9/lMF4hXmUYlfG30vv1IJbvnxxbwoZbUERmSbhp43Wex

8n+IB2ujlaEb906uUFGFdzaTw9WZZ7JzGdIAYlyRPL
N28Y6OIwu9v/pwVkGLb4j2ekS+D6ySihJ43RH/AQ4zkGso3ZZ+vALsK1YfPQdCt3Qij3shEc/AdbIrOH

Od591MrcJZ0aVyO006R0OC+hhIwdlBoZtt1NE7r+/52bIvqCyqnB9sboqhmeRCo1D0HgoEowyySy0Ymj

uXs4XZfp7+aCsLb9nLZ6E0V7twH3H4JeMG/OF6y6g1
Z23dL0/rQ7qfQttnuvZCpYy9oxf1ulBRa/gb/VN3KOjMQQHKDzhutb5g9yf+/38Io2RRoihaDEJM9+xJ

ZgKoIriPhQvQx4MzXkDkFzhyRAD59K41Qg6sUin0k7dX9mtT67dmspboVIgB5jvESqD+geUr8SGc/V9o

AZhtA1teiW0Cb9FZ3D3YafKml8RDfu6UW8l4XSgFQx
3xSck/Cm7dLBrMDOuc4AwmonuNzofyTiZLy3UFNz2Wfm2qsjnj2E0fsubEEHHeNxlh7qp3TEpukEOgPt

O2KTGOsV2Cy89iQ4ZKblAeYtwRb1qUQekZywTU5QPuKrfsNzQ8VKCvIFaCKafqaCCnxqc9PdJEkHdfg7

6CD0u1IgZtF/T8x3QXTCzSX/8Wb80Xoykz6LMRMEHm
AD2l82oq99NJaa6D9KjXOM7DrNwvz7/lG+VE/F9zgLDPcGsnhpzjyozypFulq5q1GyOaVfvo76i2bv6R

AD6IP8kFU9og9qCGoMuvxIZCGOpfphkmA3dXRbF3pLypBpAfifx8d/2r/Zz2rxDkjNLe3jLkROJ55lp2

cIQoRaBOIs13vlByMkurwOdviDnKskKEB4dWhUuYni
Fo/HgQQeEvJXnODMzj6KNLuPKpW9KI1hnho8ioqpW40rLSIWVsLzLPC0ODet5gXM08nc8SD9GuRL08RO

kHUtbyQG03744ie4LSBl1qwunJS2Hm0CbjaKxwSQW/3FJWQaJqsfI9D9hmdgB8jFo5BcaCCo0SEKObIh

VVyMyMvwswnlfaFoUBrOl6ppsQZVsbL7na/I0wUZC8
EhATKPIekAgkdlf0p/PujaEOHHJKK/q5XNgSgm5DCP7CabBl4aon5z8a/Ndq9KSGcXQhmYd6JoxMVRVB

cevqaBGnDBGx962lsMRBDyV10dzUhyt5XGr3K1GKQksXd4QRUgvIHshaNI/5Q0BytMAPRSFgmqVRWjdg

d4Y2WjaTOw20GThpEzLVKHlZfIFd2IYhJS/OJuGsFS
4nuyBAVXOhfKWX/i4leWFICi/x4XKi/txwtvhO338SV27a0sLB2zdHgq3w0SW4GuErIQFBdZ3d1zdwCw

5oGI4k9L3cJs/kPTtDZGlu63SH+D6Fh0XpdbgvhGYC/njfIHQxny5UAUEjFgY0bn/mjzGdjU2sE6BlvI

0T40y4CufN/nihcCZz25JDzWDQhOzTPyVXzGV+P+Ni
b9bLPz+fPOiXKSqgxEuMvXbW0uW2X346ova+E0Fp9HwQNi9VSOOqguDypnoj3ejX7zNpKGnYdgZqgJCQ

4FmTpLOLE7UIsVNpjNeury/ZFhtsSadP511AfBbt+Xv/EEmqiJIIIjKroH5bho4S2u6+RFGEHQrUhN+/

XHYPcGGHdAciplIy/cqgQ+/Ck5y8CfNpDdUQu8fzB/
3Br2XJqVxSOTl62QDBthIGtLmv1iq9aKKA2fxQ02ohdqBskCVhMDJi1C6K8zr8zrup6Fg0vY9OaF6QV9

ZBnfP/ikWkvsKwbBlCJ6YlkBWnorxFP7bEqmgq6X0A8h4gLHVtNHVGxfwt2rC2T2ddOqHkv6bbdZCA4H

Ik+UWYK3GsQ0BEm2HycsgLzsDBm2F5Yqcm0eoc1XV5
b0OCGSPbj+2VqAONmB6K+dTuqTe0N50WbL+51Deufp9aye/nlrJ6k1+oNWMXZuVEjnZrmb+x0yDYoEMJ

ecKzqjZ6eSyfl6Z6yv9VuNnywUIdxboh4IxivH+j+v2mLPiNBSewAp10skwXN43wD0bIpxy/6M1NcIIZ

Ma1QEzbPtxcxR3JksQc55YJsESpKzdQ9H+gU3j4bpZ
cpur5GGmJEhpQ2WF1srwlq0VYzF76E407BHRlTeylhasFJa/XfvlzzeovkbjZxGhvaKvJoXVbDAFywvT

jRq58UVYQ4rkL38/skGoatX1yKZCTZiw/Q2zXQmYWiJz1G6XN4xow6dikT5GqB/ZzABvIrZXbf2JDD8s

lDNFDKjUE12dmJf/au/e/5nQ/doHTn17vQVemXYH64
MKjmA1PSJMAzrtGvxAggkZ0jNH1Kdd5gBhHF83M1fux7lJl3gkdTdkl9YcnsDWhpiHcov+P3z/h+/jcX

54/QWfx+v5fj8+a2yVE661i5/ZJWvsZzDDKI70jWY5U5671Bw8bxyOhlPsiG3VJQmG1DgAkFBizbQe6x

el92B+Zo8VQFoAMkyY4xAgdqOrAktJzR6RiOysypjK
DQFfNvbpJlm+w47yRfEs5ot3P3N6XMehZMXaBillaTbtuuhg//rrqXsc4Omn/J0DzGaXSNvSOHi7eGnM

EX76Lvd7GGHcZxqWwrOgoIbortR6aM9HB9ABaxzkqgUgYYnMh/5kMQij+VYKRC3KF6z3xRoTs7OY72Mt

EKZ6RDujgMCaNg+bukb4aQoaOMO5P4SY4p0Htgj2XC
vO0lw3JWs0ObiukNnzNYgKqXFk4WU/Gx2cA+oEGfHMZGBqi5ZN+ReLPC+7qhgzYqwlzJbHfRU+HvbvBq

ee5WJlS1v4/F5t8kQxnrRGY1lHL7q6WdiR+tMVpjY807lGl0n/m7MfRIDSI5xeWA2lGNhJ3uLySpRmxF

m+Bw2VQtGxRq2iFt4tPR7zq5hoXYksfS9AEQIH8ouB
zHE9gg66/V51nkPm2FVPWTpEp6aPNg02u9KPS89MRAP7TOJSogbLI6DRD850+OXbLEUAPu+Jssydz2p8

aD6W0MNllQDYQ16Qg28SlueReg/cXc9LsUbBPeu8Rlb5lgjwPwfrCRrHnyLk5ZXVWkMSYEwwarVEhSQx

CnQNzReNSOWQ4FK3V6w8unzbR+LjW2VlViu67ZJx8P
TBguDm5Y6l6u5gGgUldxOjrwohX2LptqKCTRmBMJQbS1/xxhKFCgr1odVZqVStmy0g9urR5z7piokHhw

2HCwkJaNzjQRPDPXndrXThrtiKm1Mko9bmxja5CvUQZAbqFmdcWS5ULZ4JQ6GCbUQ54NELd2yOsagjPq

5Z7wamvAhTu0VnVQUIOznOR6T3ELjffPO9UaqaOMpZ
dqw3pvnjp0Bo9e2ulA5tmIDGyNRrgo5wMjvHvTzyZBHbS1EkCrgC9iyLFWg33e5H90uuOnTZt8y859P1

jq9g188IeyznIIMS73RbJ59qKIZ+wT8jNr9lhalsaioLTQzNyjYMdWBqMkGXmc6E6NWtGIss79Iqs+2g

pqA4fanWvMU2ujGLk79fSnXxRJaQMG3rbVGrQHp5Xm
OJ/fuiBxPX963UaDyoAcJNBEofKaW6btqpwLRZDGJB/+bstTpqf6sJ/kCpEtY6MxzCzsq7JzU/Oj5Vvu

InjyYi0ISkW6hKHxhZ/ChVaDAHTDfzphycwWF6cCAJFpXe3hGKiv1E/wxRl/GSADRsKUtx6F5pZACh0m

Tmvy1nSfxSvsS6QShLyyodVug30Ft+StYqMdfG1y2F
aP4VdREPEEl0ljSMu2Z0lhgeipH68JWXafObV3IBH+I9xACpibjO7Mf7ODQvbodCko+v/MN2zCQ78HvV

VUylT3Ojm4Q3bZRkkPQk1lYOl0eGK1tl1fv3u+sgRLZXDpPSdvxKZo9z+G2635PA+NPaMcWZWxlY23Zd

AdRLEqdW1sfG39YlfwbNpA1+JeAw1xCnczri4DVXwL
eq9/fmakSF/tcF1GsLG/bvvXTqvTYkxtDNO0+Bk1ufcvYjCs+wODx/aOJDo6gjwb1PJ7/rFCgSEUn5rm

GzG9hur1AnBCKBqu1I/VxuMHhQ9usMEpwuSWS4CN81a4AqrciGF5h0VwtfZx+2tuvHgu0tLxA2Iuu/X+

XhZiAk/o/vc8l/rXhoVgfAsVLqW5hnMg1gArUOdzF4
/hLOOLVvVh/Rt64ipQdn3M8A73VtLDeIkj1UAvNFTC1HDbKVhbeeL38NvKFviGtVhcI9HbeePcwwv0eW

MotJ6zbTe3LU+VtFBsmfaWzi3/z30RiZ3vmLGqQVm6KD8vS9j77CG10nW2leAxkaCoj7aRQxavSKp4vD

IooccYebqQDkU03Z4tOp6vs+/enw3gaSaAZieI6Wnt
vA2SULZXAM/rGE5vj7ekkpBHDP7swv3KY8ngMzrUGbd+sGPBNS7mwquo+4rBFLZVwjl4t7Dtqiuc2VLS

sgDlaFt34UU8ejCTiTQ/JTwrsINTefi4LHOy4isGPi4ZbC8sifgDLJdLS9wm+mHKmvW+hBFbvD9xuHW4

/k7ZG6+E9F51i6rCT4l8v9uwGhOS+icpOt7bCfaxUi
BjuMpANCPkZ5kbaqyshw5DQa9+bhXlnBw7nMV1b/FxQ1YKsOZwrQGnL2Hlz1IuMyQCoN9AHMZv6eKRky

e4ZQjmWyWHTj5MrMB6kQeDXFYLSFENHzlOeqKViRJneQLJesCfHD9RY7Q/DjNyhTfdWgh8vjN4qF/3CU

McOFyIfA7deY1HZviCnJWiD4x9dt11+Zs2s30IqUZV
E55VuXim2kVLine7n6gI+seShvcpm22GWcX9v3tE8BmnratsnJV9R5ssCix9AQxhHEbX6NNjJegEuHJ1

IVBCqhQ37fq8f6XvAIoVcuWsIeTOHevfGDf4R/1RRyIk4FZK9gb7DMS/4Yhl144U20AR8RGVCY5HrKDx

8QdMn0NljNzalogtfba966gn8Lj/hBcyoS8z4ruBNI
9QRO3cw/YqmeMmvbEuvJMbw/vzJpTCZ24S8XF+MJlsn3+oQzSA5ZwU4JQU3y7olwSCtUAjPtTFkDlCFm

xxCW+v+aRByn1CJW/jYdJafJ7xBhOaJN9YrJFoyQZglXVY0hZac2cyiT7DfQLg0cj/uS7lxd9pHqZMTR

Bf1vCs23Nrp7fgQyN0NcWHjeJLJ91MNN1t8BC3tTzt
P5Cqq4RdszYXyCbpadwzcnMbl1s1RB7qCxlcFwlKR1BADdAWJldvvcewn2B6gT0stxYyhBLMqdVje3gS

jcUC6oC+joppkKT5WqVoUrubxUJUfckKudr5w7izlRWoJXy/V0A99hNhjKdHGWoJXQmbwdsdZs5qkeSW

KJ7VJQ+wqqcV8owVTOrQ4vtwi7XZRxZ2lQ72xlkTDl
7lQcMwvO7q6rr0RmM93pw8jUUK1UCrpdJEBuC/kn/+T/Jptj/xVYzs8zGQkytcEbrFLvJJ1MRA0ii7Dn

YDF0VJFbu9DrYNl6I2dmpqy8aGFgOA7+u6AvLBTgUQmlFxToZeBnBFKyIgteITIiLSImuZivTE6oZEDF

eofWETetxxWloBNpGH8NZI4bd8IaLUO6WBLei7SiVY
o1I6OjlQWfrcXwXclahDZJYPXnPFErAUFjL2PkNLirZ5dCVKi9WEJ4KFM8WXF1FhC1JWC2ZvJiGOczEA

YwMTVDMTgzRkU+CTfOBBLhHNB8CkSYLsK1DhmRPWH2UwX8ZkRtSJXcKUNXSS3pZ8Ohl7MjBOTkQLWjKU

1mrxGqJLHfBs4MxCjjOCG9B7X6gBLuK3dAEYLxLaDd
UNX7AVJnZm1owFTgXH7IXmyyF9HaFEZwFZVJRBALUvu+OCGWsUAuZ2AgRquVbakBESCGPCT3ooUF/xnB

PXTP0FrROZE4smTaSRl05MbUmK0HFTMuRV2uIYeqbqZnwPFeXC1WKlOiFR7itw4YDkC4LMJ8tVBtYh1O

MEx6EkHzOTjeITGCAo==









                    ID                  Date                Data Source

 

                    [id number]          10/20/2020 08:27:00 AM EDT [facility] ([facility])









          Name      Value     Range     Interpretation Code Description Data Felicita

rce(s) Supporting 

Document(s)

 

           Triglyceride [Mass/volume] in Serum or Plasma 146 mg/dL        

                     [facility] 

([facility])                   

 

           Cholesterol in HDL [Mass/volume] in Serum or Plasma 47 mg/dL   35-60 

                           [facility] 

([facility])                   

 

           Cholesterol [Mass/volume] in Serum or Plasma 199 mg/dL  131-200      

                    [facility] 

([facility])                   

 

                    Cholesterol in LDL [Mass/volume] in Serum or Plasma by calcu

lation 123 CALC            

                                          [facility] ([facility])  









                    ID                  Date                Data Source

 

                    [id number]          10[facility]20[facility]2020 08:27:[facility]









          Name      Value     Range     Interpretation Code Description Data Felicita

rce(s) Supporting 

Document(s)

 

           Glucose [Mass/volume] in Serum or Plasma 100 mg/dL  74-99            

                [facility] ([facility])                              

 

                                        100-125 mg/dL     PRE-DIABETES/FASTING

>126 mg/dL          DIABETES/FASTING

 

 

           Urea nitrogen [Mass/volume] in Serum or Plasma 14 mg/dL   7-18       

                      [facility] 

([facility])                   

 

           Potassium [Moles/volume] in Serum or Plasma 4.2 meq/L  3.5-5.1       

                   [facility] 

([facility])                   

 

           Chloride [Moles/volume] in Serum or Plasma 103 meq/L           

                  [facility] 

([facility])                   

 

          Creatinine 0.9 mg/dL 0.6-1.3                       [facility]  

 

           Sodium [Moles/volume] in Serum or Plasma 141 meq/L  136-145          

                [facility] ([facility])                             

 

           Calcium [Mass/volume] in Serum or Plasma 8.8 mg/dL  8.5-10.1         

                [facility] 

([facility])                   

 

                    Alkaline phosphatase isoenzyme [Units/volume] in Serum or Pl

asma 66 mg/dL            

                                                [facility] ([facility])  

 

           Carbon dioxide, total [Moles/volume] in Serum or Plasma 31 meq/L   21

-32                            

[facility] ([facility])            

 

          Total Bilirubin 0.3 mg/dL 0.2-1.0                       [facility] ([facility])  

 

                    Alanine aminotransferase [Enzymatic activity/volume] in Seru

m or Plasma 27 U/L              

12-78                                           [facility] ([facility])  

 

           Albumin [Mass/volume] in Serum or Plasma 4.0 g/dL   3.4-5.0          

                [facility] ([facility]                              

 

                          Aspartate aminotransferase [Enzymatic activity/volume]

 in Serum or Plasma 17 U/L

             15-37                                  [facility] ([facility]

)  

 

                                        Glomerular filtration rate/1.73 sq M pre

dicted among non-blacks [Volume 

Rate/Area] in Serum or Plasma by Creatinine-based formula (MDRD) Laboratory test

result                                              [facility]  

 

           Proteinase 3 Ab [Units/volume] in Serum 7.5 g/dL   6.4-8.2           

               [facility] ([facility])                              

 

          A/G Ratio 1.14 CALC 1.00-1.90                     [facility]  

 

                                        Glomerular filtration rate/1.73 sq M pre

dicted among blacks [Volume Rate/Area] 

in Serum or Plasma by Creatinine-based formula (MDRD) Laboratory test result    

                  

                                        [facility]  

 

                                        <content>CHRONIC KIDNEY DISEASE STAGING 

PER 

NKF</content><br/><content></content><br/><content>STAGE I & II      GFR >= 60  
     NORMAL TO MILDLY DECREASED</content><br/><content>STAGE III          GFR 
30-59          MODERATELY DECREASED</content><br/><content>STAGE IV           
GFR 15-29         SEVERELY DECREASED</content><br/><content>STAGE V            
GFR <15            VERY LITTLE GFR LEFT</content><br/><content>ESRD             
   GFR <15            ON RRT</content><br/><content></content> 









                    ID                  Date                Data Source

 

                    [id number]          10/20/2020 08:27:00 AM EDT [facility] ([facility])









          Name      Value     Range     Interpretation Code Description Data Felicita

rce(s) Supporting 

Document(s)

 

           Leukocytes [#/volume] in Blood by Automated count 6.1 x10*3/UL 4.1-10

.9                         

[facility] ([facility])            

 

           Hemoglobin [Mass/volume] in Blood 14.8 g/dL  12.0-18.0               

         [facility] ([facility])                              

 

           Hematocrit [Volume Fraction] of Blood by Automated count 42.3 %     3

7.0-51.0                        

[facility] ([facility])            

 

           Erythrocytes [#/volume] in Blood by Automated count 5.12 x10*6/UL 4.2

0-6.30                        

[facility] ([facility])            

 

          MCV       82.5 fL   80.0-97.0                     [facility]  

 

          MCH       28.9 pg   26.0-32.0                     [facility]  

 

          MCHC      35.0 g/dL 31.0-38.0                     [facility]

[facility]  

 

          Lymph %   36.1 %    10.0-58.5                     [facility]

[facility]  

 

           Platelets [#/volume] in Blood by Automated count 284 x10*3/-440

                          [facility]

 ([facility])                  

 

           Erythrocyte distribution width [Ratio] by Automated count 12.6 %     

11.6-13.7                        

[facility] ([facility])            

 

          MPV       8.6 FL    7.8-11.0                      [facility]  

 

          Mid %     8.0 %     1.7-9.3                       [facility]

[facility]  

 

          Neut %    55.9 %    37.0-92.0                     [facility]

[facility]  

 

          Lymph #   2.2 x10*3/UL 0.6-4.1                       [facility] ([facility])  

 

          Mid #     0.5 x10*3/UL 0.1-0.6                       [facility] ([facility])  

 

          Neut #    3.4 x10*3/UL 2.0-7.8                       [facility] ([facility])  









                    ID                  Date                Data Source

 

                    [id number] 2020 11:00:00 AM 

EDT [facility]

 [facility]









          Name      Value     Range     Interpretation Code Description Data Felicita

rce(s) Supporting 

Document(s)

 

          Follow Up                                         [facility]

 [facility] 

DKPMNw1bBnJDFlXmIKJeOgqNYBzhLCtiJWZxP3I1ZDxsJs3IXGiqopQxQWFbIi1+CGTsCM2ujt1zKYAa

gMy
4nSQHdSvxmK5TmWPOog31KXQPwBIjFHjNmHeVdTZQ8CLFzUjH3HCK7RcWqRofoDR1aEFT2YFCnGEbxDB

UcIJwjNYGxIWTlOa8dUBjmTYapSc7CLU1rl5AzBZHiDAHhWhqZHKybLDfhUVTnSNBuSHUdU451dpLiLc

5GoYOvNZp7GPNzXbU8GSOaPqG6LGAbDw6wQdYss1Jr
C6SgVMq6L9qLQginA1NdUUibRB8rUMC9PBVzZk6RkMqrUTrdEQQTI6zrHqChMDXdFKYIEi2+Pj4+DWVu

ZG1rtq96BJGqi9GjHRv3L8F7nAUxV6OrS4RiMURwbJURf2mjZvQsDNN3WMTqGajkIC4JEYOufVTiVCDg

OCbnQI3btfHaeLP0RB8TwOkxYRSfWOCZVh6+Pi9QYX
TyriChMtKuFAWuH00pqPJsjZTrObxdMQHNHQ1+TWLnLL1ven70ZCAzk2WnWBx5X3coawq4xDIgWLAwMF

SeUoZvQEOdIK7yBE7SdSG0hBGtJB6WgABcLG2SzYYpXQ5ML8AbAUW8P4WjpMNvyzTjS2MyGZNpCHCpd9

VlXK8VD6TWQLDwUYXoJ6BulA9pY8XbW1TgA3Jldyrb
JVQONv2ZrQB5dOTlZLJuM1kzvNbbnDKmPAljH3XozBICJTNJh30ev90dxyFxQP9+l2NbEXZwRIl55kj2

AGJrm4oc48pflqmV15fp5+PN8W7Lt5iHtH8DiBJDuTZ1w5mCjw8BO5LDgf/JnPmCgro731906cndoQ58

DCXbvo2ohE/r4rg73DAeZDHQOWUBJXGIFHTOQSEMWn
mRk5GNpILgnoNWX3+OhUxRofRswNbHbKR0STcAXNkkBkBAsbiwtl0yWlUOnt1qFz2uut1nGeiKbmuWvF

mMhUXM+Y78Hef/o8xhScTptuWbZAYBuEkI77Jj5NF1EeAoVMO4AWd/M8D/XDs7SKTsDEXPFHSKwiyswI

HxhKkZ7VjUNKrIvK4vW/2RJpMnmU90+yCB/FbNDOW9
Hg3k81I8ZEwD6e5J1ivjEv8g5iH5tZTmZzPURnpBLtVjae6nOe5RMqtT7uWvmeTTJavm8ad6sqqGep4/

XNvb0Ev7i5i1pkr6fp6FD6+RqZbGIe7Yxxv7Ji2el3RoeHxaoGz8hl1zwuc9woSex06wtVu8URAocs2m

wWg7WWFzU2n1q2K9mXuuEhe7qqx/eIT+fvqXLjgAAt
+qC++vLnhERARElH/pgoP3+lcCPESkdx+L93lfoRo1gH9W+QMQSeWbt2NTuAQT/WgF2RVKuPDUi8

cO8i9p/6bs/1bocI1Ofq0wtJ/NxUmXLHG6X2bJfYNfmQTVX61e+j/8wz/8wz/8wz/81wL4/mxQkCC+3H

75Zujd6X6lqLoVFK1RuXQZK9JRGUO1QpXUaqurNkoJ
puyR7b9Ypqv/e6l9ceLLr4O9raDNHts5t/Yib8ruOrEY7ie+WqjTOcILAWwXjHUJi93qAklcDsX1zWqL

mjT+P/z/DxxYjIo7+UIyuqTs3A04BzKnjIkWoK3jpc2Uxd5RXHebb3uCLxnSLRRKZ7BO9MIZ9yBKBrLh

Z4whDp2kZ75xJ5GVVQd0fxD4rNNu9wEdGFXRpIBSvX
BRz11uHKuuugXh17BAMePHJgONqdv8OzTtPSN0a7sg16j1aQDBuxUNm0Ie5SXOj5xIihfbBPg/nw8jYj

/FihzXYKbXWjO/N9EbROW3il3/nPVxc8W9DorDZxlZVSCel2GTQkhEyJKdTEJzOJ913X4HF4/m2Y79EB

y4TI1f3BF/6QSl3WnPjXa8F35hau3OAaDuB2eAkQEG
zmAb29QNNsNE43mwROagico/skYVOM90SQHvCjO/ZrdceRzaqoAXRY4Vw5Zbj4+s6R+mdEc5wU6lkRUh

Edvn1qi8Ql2W5X2a8ycoPvaYSsurdqjZCDYJYeVMK7xgzhDNd4K1m4NdtORkUDW1r9unyOm5aiH/fCDb

/ZDP+eZbejX3i8PTN65zkGITURxsnLNQArd4VBst18
RzHiYJlMePHvdaMbYL1gLPV0CRBaBohEM7aEsnrPClqycOtqfHYCJb4JYjc/kJVhTD2VnRHC/KJ4rtpT

DX4t2Igq3yXNGtDyHcEheX76G4VMn1BWT+6cwqslmTP0k3b6mVmQOiaSwd0dANeWSQCBwQ4CzcfW5bzj

6iUPhk29s3SU0UOtz7AQj+0EkhbBJEcY/TqVhdy2OJ
I0Om5ZCjvD8cNzkb42YduN5gtq6eY5zso2NtQGf5XkN7rAaK608VJP9csAeOGYpRr4EsDztgQRrNhHs4

BBF3WVZqIVJAAkwpyfOgdc1CD4cVZMfGjlQAQ3Q69PsGKZM6pli3MBuEIt5KWfbUp7QCFF+/48N5Nbpz

326FMJ+0M6awUQGa8jIebhUVb8w7hNvRGH/G0WHn0z
GnjDUlhXdo5DHxix14bCiIqYX3XsMN2ofDcTlDHTdNhOign2VVlUgoEvTTkxLz/W0KvTU+xl0pyMR3qQ

wW4nu6JtuuUdse2DkUS4xtULcAc+dQsjd7DEljVIbBPeT6W+FJ6SevCgffp4GtB52V694zq9+4PRmv7a

cBOcyESiFQuuASIT25CENUw+6o4c+qEHt/lse9+2wq
f2i0q1HZ9k2zsVt/jFcaDWff5JoQP7a1GbLTA/zJnGoOIE/y0/J86HPaRwXRAG0G7ePFZT/gZITXZyZo

b/PZoUACsV2soIVmT74lSX1ydr22uxBCFcb8jlkcYY9aFawHgdqF5t0EqLdQQ4d+3ZZ0SrAklpmpXuAJ

tlnlNVexjUTwxzT0RqwWAof29ssAd3rLP3aGod+XsG
M7+E4BAQTuAH8FqcEiL31GO9x2E1//geeqTPbj9dIzWJDYxXkpOuNQeuBp6dcfxV1zSldhZ+ZQjGFq7X

SJmRV1/FVTDfMKxT8JWErisaj2V+e9hbvTPN2rc1k3yQHg2dtNWyOiLg9RF/uDpgD04QLO1oGR6Ivb3o

w8LLQAU8TrDIi6pa9h2r4pLrNgxMDnZHGQCEnGB1Md
nz0z/H5S8E/e7SfcrW66UhYzCSlR2kD1fUHFue6G+jfJ4y0bwFsFrE4Ph+b5DeNhy1Jyc1dT2yHtGyCa

h7nPUa8sBc7KWWBpDuEg3LOiJaMWsVrv7TlJsbRchShL1wlmo6j7lSMeTD88qMyHzdnK22X6QiNqSen9

0RBL2rckfshASkT8NwYEmevgqT3gvC8iKflIx0ovvF
zGVNO7mEGN7GM42QjqV8BZluBkXX4Hb18Begn/0xDjP/qV1PahykqwbdtXuaCyteGFGcrRtUg9KJaCw6

A4zU924p/9ymiedFZTbrYHQGJ/qVC9thBEaRWxqx9KHvTRpl2Y2J7A5sYG+Llc2Ld91dfLWgV2hslqFx

oIlchqazqEXLXeyffpv3A6Wv71m+7raazjJbmZ9G2c
96fBz2tjXU6wyhcoW+w9R91KsqKcHVzllU0T2Rp7s+R7k1LwBPDv650uwqv8sWBPxO/K/MIqy8mr3NsC

GWUO7PdmEvqm0gprvb/9buZtkp9llnPQikuq/InhHQS/lAx07mKip65qR/2x7MYpkcHMGEmbgxTcnol1

T2dTccAbyhiB2MDsaL1qpgAxtzpF6/qx7CsFBuNO80
S9KPcfsYFvMBpt/V6zkow14PKYXOkIHVhUXmgjJYoiiiErUCGkWjY8QU1rTEORXQhaBUbKoMlfTTIiuP

hWWzQ+6TuM5ntoKPgBQ1s2+A/uzOU8G6KxCoQ4h0+FVpkGNk9j8IIsT+ZWsGgDBD1m6c0ZfkSWsiB7ys

onITh7/e6zhz2T32JwA0YZN3wY/oz3VHu1JDFZoOq+
eQVclT2a56fsyJY0OWM1uhQ4TFzpCHagDmWufhrECGUX0OykYxHin8BwHq1ZUr9pmDyfTTRGan+kE+Ov

zejxy6U7c91UIri46wfI5zBABxHe+lX1X77PCeRYD+DblrIzO2O7PxqauQy4PWpqgf8ex/Kg8uXyHRCO

mk4Jp1cv63DJ9+ZMFK9b9Ft7NfHQyNUzREdpdRbgND
ZUko6ghZOtBsoZ5WlCys5VeK2Y/kDDXHiOK2M4kogrK0gN0+qGg1VRVJqaMOI7+9awivci7oUs+oXZW5

6uW97DtlQ15uFRdA4E7bCiG2sSlQU6L9tf/9PWJR83+ijOnKiKE7rUyafBpHsjZNGcayLariFoIQTN0a

qw2AbqdxEYrLi1IfarTGfU3M7ctAN1iV8tKx+wql9q
TUXqqFiSJjOhJPaUH0vczq29O9p20bpQ8gIDdBmWmOeImu9tdE4T/l23wEBw5PtaTYX+CFvRZT9JYoRO

hzFtR4Sy7RLCtlN9jgXVvMBqoYLzkSrIgXqAa7jLZNOtRixyYAiX13qepU97uupbQYFL8YkCsuneXcQE

99mzDWWt9v5qFlEHaoijLoLgYWv4Z3UiqpStSUH8QU
RjFCW4aQ0fTdLXF55WTBCERF7kTS0sa945aUtihzjXwz/aaB83fFuKZzGd9a0Jec5x0O6OWvP+52zSNQ

hcgxtiP8ps9TmllJSTGmKXmMV3NIOonf8b5ng0XXSP2IF5v/uSdJnElbIR+u3vRVCSV9lacCjT2ty5wE

os0rumRogQYguuc4xuf5N027rrvQRnftgP2IgZtt3p
/uNoODtchcDH2OhUpSFG+9WquUdBCH7VvdiU9VGR7w99f/r3YAUF27wRpngamuli3x/M2y/OTLB3a9Go

fclarXZVNZ7PL0yuejgvYfRDVlDY8YsHmk8VDFhfFMCHSnpQ0/s5sz10izVb9NxbwOrXcA+VCS+j7cqz

+GSTNdic6bGQtietCUKoGgec3UaeTbJ+CQBJHZL8wg
8Uyl2u3mKVTtMQD/XH11HmLVqtP2AZkxv+CYayJ3gPumF/EOAfeAAvzOorIV+3dbxaN9w5HrfZhv5/xs

gTUH4gG4r+DSI/CrEfCm1Q09KABnSGaHgpIl5fkW/Zi8uYytaZW5j9z2WuK0LDsixk/UVsY36zMsEBRW

3kpddGPgAFvxmhNV+Ivx1iI8h4A56qT3NLEoZFRayF
d1RJVwKi5S4fDWwFEh8NMxezSqZxI/BJWBvHcz6svWSNPhZEcIbPuEd9ixbbjVzP7YO7e1Y0FxfVVpGn

uS5nsJ8BO6k6I/xg8x5kxz/S7vsDLwlpnwrxQWWYcJEdZmgc++xYpewDZ6Uyi67VXz9hioXjL+CMB9qv

z6kxY37clx6P9Qq7mpqWVPB6o1InROYAPedjx/2bhA
TQdX/cAtpu3R9c1Ur2Y7oZ2+baW9DwvkTasJZz8g3i1pXFXd4JINQ2NeV6pk5juv/YARDnZi1ipE/6QW

FzABfaKhmT53XsPJG3QogvDUnwLhwvt53L3AqCuMNBGpo4QTqLiqRCnkFcQKw4decCrBrVeqatdTGUrV

hWeZ5OwNsoF0KGkxFTua1/JeKozcHZrsK5z9wd/FJ4
l65tjR39eZ9weku6Cx4FGzK6eIFdkmeY0I7vzAZo2wMPz0M7K8Y3axjS4+VJ+b4YxrmKReg+SOWUOELM

2uD4I37t4pzt0T1W9tJbqfxuURmkIkKTXJRW4O+idRpdoO4J3TSeiioOHkVoHzBOT7tBQWWmVDnc3i0j

Cn087lQGyxvyjeaFJUwNMGdS4TP3WEzN+2FgNYV6fm
+MfJ1YBPBrhj4U9pZ0K+Z4m943YPieFDYme6HZkVvx1fTMrrdjjlkE3H0BaR5XkDqHjYHM2gd+0dGhd0

0lJNf9/M5Smv67JhUtKvrj2W5QEhlngAmWPABRhQ7Vc9KbdbxCNh9l2bXtB6vAk/KKc8KB1tT/qlcIZa

C5ij+8+MRDMFP4WBJj8O/odDkzIrPYlrWG1Mp+FhWw
og9rGWJd3MCWIWq40HxlYB62AcT+zgip/7VX3nDwUj6eEf1ECvz0N2yBpiteA09pf3oxom7RKI0OIZSx

PUlnrqF6fLLpuw1B4lEog+algPKuib1cYNaTSLMhQGpMillC1izkcB7OrrDU2DhKP8He+92i9mZyjJMV

hpEYsz7l1ddGetqi1uxAgiHT4HbEecJYGFwJQdPBYV
8kyG8V6j6o6GvVIN08dbw9xMj/CqFYRE7Kf61CI7W6qww8sDl7BBTOLBbKKKJsU21JGnt10Qn78Gs6ha

IGnSnvFGNCplm+KkOHClMvAX2dndjzHKAopWLk7jAlsjgEBP7GXs8LCLmfUbxeXmMC0fvEGL27VSnUh5

IeJvTteA9LX9y1rejzMsgJLs/4ZrzoKN109gbzVZjX
nMgZ86Jt0hSmkO3wHaGbp2B1Nu/ruqug2/uP9SVKGDjOKtwfLXWtpUXfaPibaSYGCI/o6xnPWS7vD37G

PE4Fukr7TrMa9fMVqPEmlSFivb8j0F8pxe8wmye4ZgH2VeG0i6jH8ZLuzct2/KuIiWj8mG/XdPIc0AlX

PVQFrfFWsi8f4kasihmsPRwtbBIJINm2sqhy4trMdj
mEKKH9MIxN6NeXRHo6/GZcuRYyz36RJe7QxAEU4+P3Nv9hQTXGdhj3oF23lKhteO0yvdtzUtQXkYL6u9

DtQYmh7cExkpw5Y2DUETX7h56mYn9HQLxDrqowNUX4cINyeP/bltVs7Uba4FHtVEk/5Q7nqScBpbSEZG

weCsIb9T/upO1BYb9KX+eLFv7+fhax6GF8beHRQ9jM
1t3XPNm+jeyjJJDhrEWnQgdlF+46c95urVNHy3TpxbRlchlcKZLq0fRLfTKOWBG5cFcmATj2N2adW/w/

/Yd9hDtxI5CN5WJZOUleIICZplcIcuBdE7qAfQwwiyTPcnTNtLI7PmFK8E7WWlSfttq0qFU/FLjKSRZ9

HisI0zdg7B412XD89N48ymv/SOODUs89jpQW9UrLeZ
hZPK5QAbBklKY/U3adfASWG6XGi8ZKqsEH9hMpxAL99lPpliBA5lM8IN3Re2iqNJPzcVFAbIdYq+Y8us

6M4Q/jIBCWgrByqwmr6bhQ1xA5eJkqN5pgmyfY3ExIqbTuuChKZvvsgm07AOuxi8d8mzxPPpWJWWehB3

e7f2N9Tcf7PgeP+3txWpZV8+fsSUwsXdiuhSgFj1Mg
Lq6BKbEMWAC9ElEZ2/wMcRLP3VFssw2RHuLvOxcnpoG2y0Xc/v3Mjrqx+60/S1ej29mlsJbanM4pBToT

2nhVOCBNlQiihJVSmYHL1nDwLkYpRsFwYFgDYaMRguhE0Rsu6zU8zE8QJJQhuG42cq8jI4Yl+/di/FtM

4PGPp6EsL0TUipNEYOPJIjoq1vXoQtb7t1UNw6iR0h
oJI8W7PMMWGjEeIDF/7ABGCz0irIblIF6RwJMeUdtIQT38ASeQRglhhoCYCuGdFEb2AW+IxcRvAYf1AL

RBSZx0yggua1zCjpIo/0bP7r8+ejNujOvo5kJml1dUH1Hh3Nqtu0QrYTtLu8jOtvSL0mOePxAPJkdHLb

IXFEhakZ39sju3xwdl9OBY2ChicFGs0LeuNTQJqf3R
PFcVqNTQtStTTberpT1dZO2WNk0WZNZCMDvf1f+Vgbi/ah9xvursUlcaavJzL+2Yv6sYgow0dkjCAAUc

ppK9uZ/Zsd6WXYlN/BpDNPx0TtsdQGOdAjXz9gcrFFwrcKn+6xTZk4eQlCK8PIdA6E3IKLFHAG8oBd6q

cV8YZL5jyEV82bdWVphzhTFPPQLEH58G6HTGvItcfP
Lr3jC4pCGfz7fF4/sVWD2EDViOB2TgjdzQOLmzEjyF/H5rkA5QrvPAud/xFfAxmgSB+tFiMAUoNms5e/

kqXkqyVXLlvJR8xmou1zh9GioY7QbsfyTc+kZselqyv37aPjC7/0dY1F0Qz6lCJdg+EFKYAjQzbClm2e

tu+3ilcC5mlcpri0FoDUClITfieWDio6GZw9IP/ANT
gdbLIu44iQxsKj83zjoPZuMqGpEhp/CuHjCNdDc4rxweqOPA3o4sKuMRMjqORuB4CK38SpVIgQQqMLka

Mc9QOf19IubRSEbKxbCvzu0+TU3ZjzOtDI5+rhbzNfbe9bTJT9DT/zSOiSw9WQ5Oq9HA0DP7SNqwg173

uwU9nY6RTcJmka0T9PWk5d9eL52iF/h6aV8bnrEAoa
IZuvsfg1oxVa1f8BX0y+8bIFYIlLpTjqvv1Hyg6i66RHEoq2c5QVA8IEOoFxtfwCTfuhM1l7GkUPrlXq

4B4Vxiw2gymVZ+D+psPgj6gcIE3U5EVsI3msOURz6VNw117v4wR3WsVls/y1S8R6v5MXANhhrB8NRDNo

YiZVEWp7Ema8l/4W+7sYchNFkYlZwIfJxK9uSRBxw0
kiaW4bDBB1zBLU6oojrUu6LPk7QJKJlfr5ZaKNmMbcLmGWOwHNyIk3xYpBkUquHS+0AqE57EvU9n6Q+z

OHBXkZWiSO/zV/eqr0TIU5vdHNIpJXscaxa/noCc77mzBNqk2ImzKtBgTNwETXSffpoyPVnZZU9AbLcL

YYlMz/Ei0rcZTPpmJlqIrXOFjERejPJCQJqj85a4Ja
lAOLPPYmx5XDF2a8m5m6RxNeGGyRX13njePR/ExCcymf3hs0BNcIWmZ1tBbRVWz7/oPmVVkKx9s4dzRD

J4J8edLWrqA0xrjtfhXWpTZwNSc+NUCyvtTl+d138K8z8pHD2lcQQAo06EaoMLlfFZKUzob+ql54pqT7

u6ir29KiEKc6CtEyCy6pQNPvY+aSyqZxi5FG4vL82G
ZW7nt9Q1UjuNepKSQG2FVhVkZR+XcIwb3NWm8NeK8pj+SzaxrU/CMcLv5ONIvQvKq7FH+lAdwcl3VESJ

3yhaWdHfUBm05Qne7kzm7MYR4nKH2mMdA8eYGt63xrfsIPRlKURN2rgpfL1Eqi2Ie+c+ne66L95GlISt

zfwIi8/mU3QpiSRuMypzuoxXVTnOklC5gFEdNBPyd4
3NbxwJj58Vib8f7AwPqYN5G3BaMQcex6xdxnrT1U79oLvng/HU/JzzJwZTe9bt0rrawEZX7cZM0pMSUh

cdjbrZ0xnRjyDdCcyD/PntB6VrzwS2PfIUSVWPWg/iLthSx2Fqvtryu5VwGADzmZVd/jwGkbphL3KxPZ

dosmC6TxauHyOuuo7CfkRF6oZOWP+tEf81belEayL/
l7vwjqNGfIpysqnaycXSwzbnJrlELQSucoLxnKqe3tOpiOfaBJ0/h/KR9WZPijTP8NEGk913NU8UYM1S

lwh/BCgci1L510DKhu3Zzwwmf0iBWGGChyHTkVDnK5MTpF3UcHHESH9XREkh5CoezmC2yqVyTb0QbgOr

N4hNPiMTLldaa/JHbnrnmBc77pJCInxM7MCTV/eCI4
cTS52Wnn2c/J0RGTe3eF9U8tjXcLKe5rWEuFkbngOxb4pYUu45AXw3wDAWOSv0j5DDn/5SbnZicTxAvx

HWn/MX2NO2gj4ua1Up7wXl7C9CMnh/OxR3/lwHdi6rYvMOij7/UGQ6gxkP5GDWywv2yUPwhKYIrSVlQL

dFL9Siqr5RPEjqktEnWIxBFeV1AGYC5NaC0dl2fO0g
TJvQxP02smPJR+dIqlWB52ggrCo8JlqvIEUkBEA3VbAVVjVtA0hRcyA2ruFwgD2Tr13x2o1UNCHIyu3I

Qm08hA0AM2d4zrFLGaVTYaJJAkeHl2tvU870emcstu6/D84cYTK8MSyzgMZEpVKUPCj2tPpKdJzyhTaJ

d1EIEW9CntHWitlBMdi7VA4GZOuZVyuhjABqIEO4rx
TW6RIFAivpWCQmu7D39fIfsR6TDXntn+7Qcy/abDDscATevjpSeQEbzF3kl/FV+qRmdQXii9ogAFhe7y

q0gSXkvAyxCWGbKkTXS2OGrLYKVPJqop4ijz8fhUnzReAj5qIT/DwatTfbRe3GWpyxsknb2ZeTBwe2VI

RbxE5JTvFg3EZckjXcRV5BjGSlt14ZUyd4sUWTKR33
uMl2NtqnQ0xlIbHlba3b3ubMtmaIpUx9cjAdk/MgZ7XZHvz3dMvRlPvAGQaPG4l7T0lgDTFUF9ETf1n4

JqXlduNFIvwfpn2XbubT2o/b3uNrS/3zUvf/yx58KT1TO67lWTglBcEzrBlcY0elIyyHYqfi9P/7Y9D2

BSd9oNL4H6F85fm2WKbj1bNRCOhBrsfsHI8Fq/SQts
PxyRpJkjjVnzwUGL4ob2RZOHw+3sGfZHDrmkAnCUEx0CG9FMycswETDITeX6wDHbDVj2bLcaVHXbWetI

O+bs4fko8FzqDr7kw081Kgk0hT9Z6ofA7hjuzeeqv8RWwXXEfRcj4Vdneg08AA0O5w9hlAye5HLd8DuV

qr1AY+Flau6A0uwkufRANnTvpnrvL0y2UBkXILVrb6
6Uo4ZSnoL/DcRXGsL78NGpyWhgHPo49yckHecGE30pVgTx2cpuamE3NPKkoH6sv34rlzFcC/CmH+A8rj

jy0w+MokAIX4ofbRCWIemb36/jbouEJNIZvNrung+LojSVVcA0cBt2jxxFVE3NhI1diDfpKm6/BOCWG6

6yOjFkUKjIFHDpMxF/NvFKJdrHTNJi0BPayuU56alo
3mNEYDGV0wqLnBZ+8eMdDjVKOMxLrht+7caLkl9xw2knnYX7dxOqYqxd0fy5xgZFPMcO4wBx/vYtqc/b

9Fnrx4XUj6SEH4Leg6DQs6Q9xF4cxy/qXvpjfyt7Eoj5hWe6QwneH8IGPXnweIV24Qu/ia89xU9Azdgr

qUUsVuNt7Borrs6HRkg2BXHajjEAvzkh55ob2gisaY
9UGfTD14vukEQVwr6PuEWJRCIEncJOExi8IQ5fC3j2Ngy2U6uFcYKlpqQ5aZgxaB0DZiEY6UierXEIxQ

BDQUVVA8Q4O3+DUykLwh2XBlDzH73bTd4WHAOIA6YbznRytLxpMcXJ3HI4kW5eg40oVydOYl5HRpcGHj

ysxbleD/V8r4W81OsT8M5hSYDxP2IUoFlf90Y3au2/
iO8+KmffQ2CMSYYgYG8lEBfQCOZyGscORyqGOlDQTFGVLZwYR/ArwZtSElEbP1J+9h5AiRzdZcvTtnEQ

ZyGODHJGQBXgFXMibHMLN0Vh9+M3s0SL70MPBG9DA+22SIbJVVSAmHMJobypQPuzYkUeJ/xt7EVw4Lre

X4L4v1Gdf2pz4RE2EXzZtucBMn66cVjctKBo8LeMKN
AL6ixr0oFVg84rhOTkmtz7eajG8KlxnDHk+BQ6189pCYh1TlObGCfeqPCSR38MN+8h1s+UfQfnJKarSk

WVO0tPBtBGdbZo5qQTptqpzlRfrnIBWzGrRdcPqkc6H3goTSYDKGdJsx9Bm6XtNN4VbHjXNQDYR18crP

uyqTBlcTrZ+1pIR7Cq8PRyyry/qduFP1LZME26Q9q2
0f3MvIjc/UJAJHfJeNGuDUcDzLxppDj6uw8PBjChAQC+A19NrbgbYzb60krcTQopiy9kYY4vjrLh9vnx

NOb12H6nLM2Nwi6vFkYrZqiUrYdQuNyJ5jqJNxX0G9ogYF/ybaTxPFFCkiHFp8e6WhJHc9xpwtSAhikL

cRotDMZTMaWUC1qgEzG1Ic06jkBGvT8MsIr6jVvTVF
jPnZ+Vnz6/fuPp4REjf5P+0mjMLcm2dE6AxwkN8dIA1PwXq38Ai5v6Tg0MrUVXg9aNZOrmYY3R0hET3o

XeYmWfBzvbEAqpRCUNFRL5sex2UZlGX1PZlXp6kDymhOVYl8z2sX8uMBcoW+clvvo1vbVb/7ccfozPjh

T4/ks2lpnxuEFfQTsR9zjo0KKtzU4m2pch2ctFxrvr
9zmHWx8hiE+pzIw8bRaG4GYiO4ErKF6jmNIB8Az41HEo1AcywGiYiZBCCShaGg3AjYAcFcO25ru1L0SF

2gdB9AuQNc5mDwFylbkXZCApmUlirauYzhUCpzK5XfpqnjKX2M02BinjBYKO1hY1njSRpvFrGNNAKsAE

1hdVehpifo8wTzmoIj45N801wrAnil9Uo4LKUzP/E7
9Gr2OopqZ8EH+zdtTkj8Kz2rrlJqLkCDUwXtJqshUWfRKuLfdLeuNmdi2NOH4BCfQJGt3Huk8u/qYmRW

hJOHCRdazGpaRDNLAreagtPHTsWIG+ShnIc6fsW9Fdeqv6Z9en/89tBMhQN4m6ivwPG8uvDK3V5YfVJW

JqcCI3MkBMkFAkyi75AWDcA2ZmFwHQ/gplWh5r9AUE
9xfolK3I0q6BqL5WwZSjuTasfd20XytOsrAbXhzZwmKUhqZgbrghS5WRKyc/OOMIbj6qISD5dsL0nYi+

MHY8U+9GU/6ndt7ZrHH2p050XLsQPh3YrJVXrmHwSPGkkoM3FZaACG+buZkYOtdmZrlLMeCUr61kxAqk

CGtJuMWQSj7Gh+SdTV+xHUN4XLl66lNmdqflJlaUx0
Jnklkz78KRnp3Y/zLkYZohM+lYvZAhTme6+Kn+B2ARjZJFGdLr6Y4LOYEuiRz6BhEI6g0eNUr8cuxf31

SEcE2uK6wR+Tbstj3crsWDwTgWBK5rR0OAc2QFV6z47ZaFdHQVuh/WCFVJ6ZObqct6SneBI/EMv08mw0

46f5HKq9vyv4wWjHsJhemqQlfxrLU9bGPr/PUJhtbi
xTBC8izoHPmMLqdJk4nudCoIBXpYNYmxoLrR7pMFq8m+pMfayac7ery8JHNVukHic4w3UQ2s1h1CRq8M

gqb6t6XWlvx8Gspv4UCkktGYpW3Xcx3OSVEsINfd25HDYMsVqtuK3ub3sLcGeLfiNApiHTPfMBN1Drr/

wu0dj1DGidzurhk6j2i5aSWTI3LndswumRse6wABxo
YkLDK8Ld+43o+y0aJ2wmZGWIesSzTzE5my7vliLKLZT6hwYnMYAL0FJ6tl8hEr2Oq30WkmxLCbTZfY44

JsemHzde5ESUv1s0aM/euweIuAiTnRa9ujzGHJ7FwHBvWQPwm/YVdKGEiJQGy95I6w28m7AjCW71xUtV

pGMJlOmU0BNv5kMDWf6+6Mvf9Lm4JYTqhSf7mmCWhH
WbLO8ualzqEJi2yIrQw48fh/ZWjrSgFfk9W1r7iLwN39BL8+3rx9Zs070VeW/ROERDH7jz/CKp+7UBb4

ayW//eyuyM3o+rNjx2mrywOH/4yiXp7D0+WKmJxRU6ARLX8dneHlSnRogyD+AXEVTgnFZ3E84OMBO6yN

kAIHyX0cTgYnntqv9k9DQHY1jhDAlKzCe6+X674Oro
tMJarbJe6pLoR5532LyiaF7Jk9tGRpEE1AT4Dd6CcfmhhOmKiO0C/rojaKySq7vbRqrd0eo9o5Pz+mdg

PpTk3FYbAZvq8FrQHr5qqcHr/LmaWF2XKym68CCItpHx4bnjXKDum2oiNt+pd5K6/yBKHvVQ41wlOcJM

TjoHFxKFuiSuq6zLo+I3+nZ3aG6IFhhEzbW00rp5wS
NKbRFYBTsduq0HfMJiac7qClnz7dCNWpqBQQvcyC/B5AhEQAbkk86jteLwJ4gHl38WurIwmsI7tGU2hF

2QNAKY0XhdhrZD5FVyxZeVsT6cU70Upr74pRAjDSFXBJ1FUeSE5p141tzMAHpkwr2wRL57/xHRsTOCct

WC6vGoMkvRczeXrEkLC7zfP1PoTCN0bQMWga3faE11
l40bWmMaizjk/iV6pk1r26QcrGUzXs4CQ9WQffdp3z45QjT8Kz9WcpvAmasCDti1k4eWBrEPb6+9r+JOSE GUADALUPE

LF7SCXBfm5eV1vfEEeYKdNc/chibkvNusArP0PLUh3TJwzfpDwhRr5rly3Xrb67FvLQCcN/pDZRlEr5q

dPEnTPeM1562X2lQIqSVuDNXbj+/TdeKpdgpnDoo+S
5gUp78B1EFjorTe9UCo5017iufNtyqbPn9UdM4COW9pAg/T2Ln7yr32Dd9deLa893Xxb0jap0Jhssivs

UhdvKg2TJErLjZp207u8uC1wMeDj06kpi9XofkJNVxAcBsWYgMdnwGTjiE03R2fcCR4dy6AhyU3hX9bp

NGoGyE8J/obnPJOhO3UDXu9M4W7BdkRywCiVSkBO9J
uazeeEn4517B5dU/uV5sBlX0yNdVIk048cvE+kp6WOehYggqtV5u+uH6WUQ0NK8FnSsNrt5ARQ0N6LQK

nmlkjifH6+S6JgqZk4qGVXI8TcZwYR3YIrG/FCb93R4gqww0QCOKQ5cpMBKHxyZT/+SZ3EoPP6Hkhb8T

GKoT8Sj04DqSHtxFJFCQFhpruye6wQHJgDMjJMRVE2
3FUftjJan19/EaqxrzUoXSMJ5anuX78VwucF42PV55PkU1mQyvLCOlfmAiO10kSxFrJ2nj9UYJyutIrc

TaVCfTOrREHj89hF/+BY+psn/BymGrweaUftifNBzguHoREeGYC4IRwaHUX1yZ+kCUcfb29+C1DUvNgh

OmprSiSj8e6jmS27KAH5uhMfiaN3eaJNNrq1H2PlTV
IO3Cne0xmy+R2JJaAyajWZyFjPZG44rTdnP1SDZp3qR/JG455DVL2hMsbDM7qtFhO0r3CLrM6qhuyiMB

eJIIih07BDY/ugNh+PmeXzxw7hMUi5tCbZkXK4jnj6vpgpMOLRBWGY9XkEN0FD6YNowwJfmHQkhr0Z41

UDMbzpONPCAXugFayYs9d5IWhOVtcEjUpjfCwJOpXd
smmNzLo/Rr3EVnM27Qu/qJZ18FU3Qyfz2NYQ16yPhMEm1k3lrB634GQ51+Qnf6xOROEe2btczHQOR5qO

C5SoBE6J1+KefSiYJHmWtWL0lToJ/FC7UZuKnaR41ggO9Zz4YzkuWFdFmibpZee6k/kVXn24DwjlzHNZ

sQUsYmNiWgbMic5JgIdQtKSF5ouDWW6dBeCiXZh6Kv
l/yDFTSNPXP/hl5QWs9Rpv/jKljOMZFBjFc9xyiBhusC4D8hhb22xCBj5ShUWlWJIL/V91H1vQ1mVlBH

II2Ux5lR9Ig74vIfwSaGcX36PvJwM1UGmvWbCZ9vyYg+BVrdE5lQ58jGhpYXF2N691vtTGy/AV587t+7

EVzppl7yFA5VX6RcpnH7ro5eeVKbY02guVihrhpqDp
LgRWg8UfO5EIqNlcrjJu35EHMF0bH37MSxfNrYYXmL/+wpu1JH+0oOVgFhfBcndV4INlF5yaPARstb3/

vJIhttT/zwc2tyTVHT+i6VKpySoHQlg3lfc6QDNg4pZluUrBgcXXXJDxY+peaDT0+JlRs0c7H9TD85fR

n1+j2iL5QG6B6J7irNtIST5BJDEmxWxinQ7loIS1DO
29dQbe7im1dL3QPxNaioicac9WOUy1EeALeWvWdRSJq+IJLUODWL49IuYDpMvweeY69jqettB/dIf7N1

fmqIXw4C5tE/AgPpcRfYDLxjfQxcdqgweth1C46y2VXThy8k7VYmW3PNo9ODlzddCG9jCXmm+oH2uKkl

TfUyp+5g2NJkxl8e0e/aBFA1XQ6WHCkhskzYDRIJ8L
cX/axvBQycmyKyvIf+FJYpO3aL2zeLfVaiOrbg/ySWGnmYsxAVrNJWKZ46S2L5QBtaQAycEZXdNXKJts

INVxy3+2dGsDgCfSOqan3iiXN7jkI3GSvGKavLJsJ9Qvp88MWuS5oYWQ+BZy5AU+f0Tf4CKexYH5A609

XNJ07gVE/k99sCzYJ5HE7RunrEkWP6mRkC3zZAPoaA
HFlj6tIYq5pODB50M7KZ+EEpkyoopG6xKVi9Co8kuIcZWJ3mI+l4qUSJ/iVEt4dnKrAKvUVec/Iy+oy5

QBsIKctd3tMst2J8S1i4y/36hXeyw8yU5woNJryrvwWAiB8AhMuwBNPmumbuDDcJwjSbvyTBII5jlXh1

dTLDKpj3qG9Y1bd7wBneOhXHjtfBMdtArFy4e6JEoa
rdo1Q42GX4vIEJVc9GxHHn+NrHVEg4azNaoCUUwxG7vpp35LtDpzGPeoCdcoVlQkW5hW76+VeAHQAzIM

MaMsj2D9KyC8RdlhBwcS+edjmUoOhAlmkZ+7zJj1Q2vgIMAAkVfoonOLoMhc3r3yXZ+V2/nvuLWfqA8l

ck9vP+s7ulUtgfw7k6g/YpTqGJInVWX9cBR1ql6t+v
mXi93ZZAk1JJtvI1LKDZTHTMviwaIctRTsllcMJal5rFSmHUFqWtaobZDQrOAY1PctJptEqRcymcNw4y

ZMFm6iSigYI+iuAh7xSp6ZFF7U9oFhTsS51Maf4BvPtFx9Zxe6JQEK7rBPWJ1b/NlVbXw4yoBek4ckHu

OkcfNiE4lsKpb3Rz1EXC5xaI4owi4Ha1oEly3jP3qe
9ll+J/lhZIzE6G88iZm4TBNBrCXpZlhDV0qlQEmmg37e6+BucM8Ns7j+cChplsqBdVm08KneyEnxQ0hR

88AaB8YLP5W4jtdoyHojJpBhefMa2Do80W4GAKPd/ZOxIL6Oh3h68XgPZwxrKNHVo4W8gx6YyBVOuKZc

aZS45bzqHkUpc3+q2G7vQwMULBBvhPYt/p2kDKa3n4
jbx+6fIWIv96/2mqR465jcYqgQnNvSmR361F5lcGQV0FPndW0qh1v9Fq8FuMhkX/v2hcYefQTOdSjHWY

tT4lFX1oXa/kHLPPj9MW9Fqic+QpSGgV6ATQeOSIMOp3wlapm2WDXu2VfbhUP1Yhf9mEbEEEyDT/fUuY

ijd5JbPcX2gUePBSOuDChRYp7a+WgG0G7P8GV1p17W
8agX0XoPJ8MdZpUUXwpIJPvM4a2OyU2n8FXUOvB4r4yH6Bfz56M03AYkitXIPQjVvJsTBg0951MVn7Jc

vvh9ZuVJRXptx4IQSS679uKOPaGSfMemICFbieDF99qsgceb0t8rWUKe41d/oIDgRM8gwx8l9jwQPEZ2

w8H5GGXe+DRx6AA3NBgaq7Onpow8E0QcPs7BBz7RzV
/5IDf8ewx5ACk1HGAPxrkl2do4BU26W3aWTMvSGzyxsU+0wn1Y+7tij0IEi9F/r5QWRAjTbyoLp6V+KO

s4GqZi8J2ygKnCoROPkaVD96YcZeIE61rLgItHQm9tHrOTQqUDPmJQG681xsOOuwBk+vDbOSGtc5q6V3

ZRrCm+KEMXoA8mXQPuWtiw6fErxrkxsSsOtNxdeIo8
qg6Z0BXzMIF8bCWotaJph5Eza9VAnSRdt5w2jb+zYWfXn7BsasIT+gabriel/iRJ7+XPxCAeM9qI0+gK8mDf

rJ1BlFtcsRVIzZISSCGJjmq8Gyr/vpvxgVFS0ElbJVjGbsfy2Et1mxS+JEzb9YnzLMnnvD77M2LAevsu

oDiikkuuG/4ZtRkDxjIY+BeFpEs3EhefkUeD3TgVjb
xLB56DU9zWPmJpn4bSCmnHc0VavP02OGdpfddc4pmV1JjBQ+FErVWdCVv2c5JBE4cWr2JF/qjm8bokD1

g/q68sMML7ScK6IJdSDFRUlDYHiGxWsSI53es7e3Kx9t28wdXgpuR4tFJMetgBdhSbiQgTKCg6cwdg0s

e+srXpZ4925qIDqIyJS2IG+1WgpcKTdBQIsLdRxzCg
ZFeyvFXzBvx/tGvgQwyHxiFVJkZXlEt3ThoaeL91PcN2826XjrrQkoKIN4XqA3IRYTiQP5HL+JGt0Fmr

+3cKTTynMdMMtZOUg6K2Ks9X2XtsbEKQjN7BMsjTqByxAN772+3JV0O3aU1VCy1oLsqCX/KBinnyA50S

TKtuEtFSK3umxSAN+XdaAVrhQ0WfeFCq8bjkAMGrxf
yAylzC2SeuvT4R+ccEb3W241p00Jgquqa7VVoPqnwHH1S3RcjjGK70lSMPpkAkSz72GTD4pqSNNCwU0o

xW9ThSdEcQg6T7A9nAUaFdvGh/sNca/A4vIASmMC/yQ0q8AY8nM3wbqVdCz8nYEAL59SmT2oRDzbEEO3

C2rnHTaXydQiuTr8HEoKsA7IpEscxHj6u+gv8jX0JX
sotpPcG/Tc6sSrGlsezrs30z0Vn1atEKs1EcT1UYyGkGhBsGOJ4d6pw2gS/TZ3CKjT3wSWtmTJPtoa5d

Nequ+aY60+lXcMf9MMqqVV+xJKLN8u+vxFeMDjtnTJpIE3ITmNCfaGhNWyJPV8yf+z8W9zuvwc2t4wBY

n0aFNSywXAKRJc6qQrP1QMKxj/jiXn1Enc+1vdILuP
S8p30k3ojn+bsxiYgvlpg7lDl2tK/YqV5GjmZq05npSliBTA1dmRxHi3JB+ATamhTrtOMmHRdakrt0yI

DzahuLr3NruUr4NDenQQcXZweECKaRq6DrwmXUGhbJPeq5Fhbz8wBuLkKH5vFbqWlJlhIhQ8XriKpdd+

B9bPyMFCbqOQV+/sSyb7mNqhuADkumE5Gzi6RfMXm3
/ZKewgI1gLoTziACgIVTY1gwsknNXBJgfSTqayk/fD3sa1vgeR7YaBs1jrmrWL0ta8yl68ChatV5xP07

Mmf6qfRocf0SXztplKIhU3cNe+/dA90I9Bcizz5mQhpaEdC/dyJu8UQzNtla7Ytfc+8d+1wyfRTxrDa+

lz/emQAbVoTti8ysjkQ5doHeR1Oye1wdLpb7e6um/E
2n7U+anRdxMf/99SRli15VZnZwDmPaf861u79S/+unEPHDI3AiEt8BI9X9tQ8g/u08Ff02C+s0g9DRF6

+jWyCM34bHEtd1rVX+0L16aFS7LiHEcaBc7bb8aqTbo7oa90YczpUw3SrdiLBCpT8x/quYJ+AuW97D

TzZNO5Vub4fd5PrYL6EvuVgVozIFR3WSujbwPDQrbt
jCrLVjkoqYuc5FtoPgwNSZLcO0VNdpdwC/todamFTB+oxR1z8EYmZaRg1ogOeY3bWC9MMrTEOqccv/pQ

wZN7d9Hb4XQFkknTa0IHk9Jpq2ManJfZzGv/4wS6Sfb4Ylpryn1p6X4xME7ra6ZCyaGXion0xVFIxdxN

M4YEukaHaEe7wc07NE96k6nJSTED+Fb/s1tk5QZKJB
EIKDqEVFepYR/6UW1VJ1kljhJzMe6gVi1JjHEAZSfcBraqEfOM652Oe/EdRYVDnEKihUIQeWTXzkzsQ+

Ivb8zu0EameMc+oWdYjZ6E6/1PuxWmaaI2WEUVZhFb8JAFUdtmC0YO9i706Vb9aFgl9+bfB1mnafvqwx

SaVbpS2VrLh5mJ6DpVGM+uIdt79sWHy7x+Z+EhmppE
azsEwl1Ppm1iKLHvRtjjvgS2H2jJRqM6F01T9GrIiLaSPGhaCAyE86XCR8SXYAc3/u9KZlkCMgOvNuWN

KaCXN9tYNow9ADsnSNE6BPa6lFXih+qA807ZWna+MJeRKubnDMBzaYrhP28o52UXMgi0QrySdXLwCoXz

vSWwYTAi+2x0ttq3Wgj3QapcS9+XQbdxnJ3YPkAwb4
Rn+hfoweTzxPVt2W+cnSG1rbXik9N2Oulg54C/shTNHmZuVd4Px7YcNCfnlRfYu678n2Mw1CshT8nd1V

agLLmioKkQdpYJmuvSe7v8ysqveWLW5cZ57MWlOHDhdg6NvID//cE/OeNBvLLVDX+Nouj7t6vQxfv7hF

+4b/Q8i2DIqMi+Bsxqoitjy7PqDcOFXfIpCVufSZA2
+HmXhesiF18NvG8790vqXxjUKiYDQf8MLwdq39LR+Nsi4dLDvaNNipJ0WeBzeovpUaThZ7M0GrIJ8gia

MWrHNKagM56t1zYUd9t3mGuD5cP/k0EDfqOXbaF7/liZXWnoW03AFIQw2xoW7+ZJIKzUOr544vWuxNK0

X30mmmlNqH0lXmSfMfzYp4UH0sjbNeyetKoIdzDjWj
SAYQQFW3YvomuhoLmxNjEZAo5ulqW7mphiaiwarvFDuI4orLnzTo7VcviDVexBN43Ze8MnnvmwBb6FKZ

dCjBvWVM8ZiRbbsNTh9A+hgaGec871gWWqvFV5bUqMf4nfBgTieYwcsMq0qTA3psR4mfPW2+p/QuuNlf

6Wa0L+JfHEOJUWj9Vve4mAxdrNh9tI560AR6Z0v2Ap
c4uho7QNGfFJ1Ve6494XMfJxNUtwxeFIIzFGll8qfpZG1b9QcgsIKFAsgcU2qC5/U5JALrXVraSyv6oa

qsqEgIayr98bTtTBAPG3M+61H7JiHa5hcPEXgAFHiWDdLNSsW1Zbih0kE9MsD954a+dA52qfN/aB0sG6

vpWRPYyeoioM4CiG+dLHDX0HyfkvhyFd56cGUlbSx3
mRGb8nCifkSXT1/q54k594ot95W7X4tW02tu2M2z42NGXeV79IW8hVvotMO5Yz3GDJ1xKSnQ4frILMN8

5Ko4lCfOUwkb00nQe3LtHt3gVO3yiU537KofJLjrNdeL/GMQuz69dPllxH83Z/cYcexadooBCiaChzS4

nnoc+P59FdVmDOBaJBkp/V5gZFDI7G5cR4b+vxHLZn
IuZDRkFEGhQPFfZiizpWSeF+bzBVol2Fv3NEcLwNTUWX/oEauha1877vFAhuSdmKbRHKupCesqgdt0TS

nwg0kqb6vH0QP28LLAozpQVFokVZ0X2OG/EoGOt2IbLauIcn/ibg0/eYC1GfLHHdzvljv9D8sauHe14Q

7BsPKgCdoJaou/Ug/xdKb3BprbnO0kPs7vTCT+X/qk
PHvuCiF/T6yqBDHCX1Kg+IG1NDV6kNwyz5crlhJHj6sKhABW1g9uV/WmEKyHlA3FMDOi8FOV4jtPtYqS

RqMKOeyKzC/YG3U5WnyuLkyANJYbsno1LU2EtJT9A05YD+tS3Blb4VeRDNWDKca4xlNhqIzQe+TXpHJ2

fP1xWNjOAERFTPaS5U6cnt8Z69XX0gRCGTHtMWuo0U
6NXDGJAxnckX8Z09VzudiWS6zmqLjatIwRskXxUqn5IrFIjw3twfclxklWgJnr3sYwgJ42KsxGi/5wu0

R9eighc2NGDYtygIhYYwSzWfN6eceKYt4/SlnieNfaz8l2uoqQ/4weqOTtL+rv0JGEnuk8pgs+iftMVY

xlagwjgCACMhXPyPJWKGaTYYN+faTXKpe0P/ttLlYT
Wrz5JUZo++R5mqOUMUKNoRlJ6dloFOZRa2vpssXTohDQBb7a7Zt35X/Oc1FKASP/8VagnqnpsIE2hgBB

lp5BHHmGcJzLOq4p7RrEK+/K2QvBjeRELGyXs+sNx8TajGyJWrVIyd2mgXfcte+abM6191U8v+I9VfZG

5kaSW1Zwpwaw/PyXx3XV/Sd4veEyNmlfN3sjat/58j
A9fhXXI3QGPnuX0FHKg/DUPa8pv2XrQjKm4Z6vW5DWMg9kQMisE3ilDjIEHMU9GYUGEQXjeOR3I/Cl8s

0ptYNl58rZjGrEnadjt75eIqkhlcntsNIlPWaF3CKSvspsJnDSKHhH28wLIQXsXVMYVJHZwS5YI9EzeS

xcO25uOufvJeOp9cqO6uLK220iVwZCEvBr7SxdfdNT
jhr9AqQHPJbiBtNlY50pQWQ7ERuCVRcFXNvo1EAgnX6tPK0M/MhDPmALGznUoL7DMfKCMMlvNl7elLtm

TOaMpz07TwwFA1AjH4RyoHlAawXhJ8OGoD0reO+yBKlSqUjfI2Ma+y2qfHU3ksj43xrl95yG3SGjbkbp

Pkxla1WE4Kd9SCnsPEy1fe8+kU1RGZG3EcLa9QzoGn
rJn+lc42UfiDlSDUa8um6ZB9yxjlvqeRV/SkkdL6+0efeh/58ZG9VgS+Fp7yMh3HIfto0xS4490iTocn

g/2RWa6E+93TWPbuiSRZIgGJNLwZeZd2sLuGriLYn6Jp3iG2L4UO5gSUeL1ZQoij/qPh9/PvUx8gwKtj

MTaWtK2wNBAoA8G2ty97mKJyxjxWxFDpintlY5rVjO
KF8zox1SlQvFBcrFq/IYwdC+0M3Q56PG0k9Mpprwyr/JBGt9zxvfT7lCuaOWI7v+MKbYTlU/7OfiwECc

0396NaRVbW9VM9RQs08rnG64THu49VnsqRZIeL1gNGGUbgdnIMVdUhPL+3aY7qZX+wLksaq3vIeURMEI

rgSEA1coFF7YgqbTRDz4VJKT6mIjzafaQyM+0XIT++
l1W6N8a7J/dT8MVF7ooB+yKi9EGQPvDt5GLwVMQ1g19juyeb4kiUw6bKjEcIctLn87MzAfJ2m7soqjei

EM+azhq9qAZvpxQxfvIt7om0m+BnerBlueD4t/vT+kheRD5Iu0QEwdakWARKUH64EWaZAl1ELSA7OIp7

5651dNcfPOm5lYHkE2YYGIZCIPrHRhKlDefysVlF20
qPUTfltC/S6g7Pn72ADCiF4BX+a1ci8IC63SG0CVErROkZ2EJ7pMHrbD2t7G/R/4CyNOGn3x8uuAHoqX

gv90kElCY7l/LQwx6V//IaZ9y5D7vDaw2P9dtXT3G1ZG3bq/aBWzvdFUp5ISSWXSyir63MEsIa9P9ku9

Bp63aQXv54IG9w3jR5YnvyXU89Zkr/ZJLpsWY4Qw2X
702+5CThbQ3B6AbrMpuTH6THAMWOwjJBMd+bblLkpb/lvBMnXFNw6FhaEVjr9fajR7c12boG7stit/11

jdEqWA18mcOEFhP1oBKLXLUgBIxyk1/E24mlfymSe/Mxs05vsH/kcsrw3kFYldwbBnxPFdr4TpXx9fQk

Cy+glkT7yo2Keai5hvOsLb3ZdoNZwnV2n9CwaKESBW
pUwbP3yOqUg4OEuccUDYGhxToIuEe6nEPqP8568vzR/Ht9GxY5eixU26+7kY6OuLJUmJe8co2ElobHxN

gr6X0+NetSwRd/Mu3bQHc4elljgpxMeyjr+Hl58pLLRN7tJs5pPmRtc7+mj4gHnayTut1azO4i2s3JBq

ISGwMawmFYxjpbJBwjuO8iEWwUCY3V2/FAYSjpV0kx
W/lrybDdndJmex9AQr4mDdo0GLhVmOuOPMnml18pYm2IM6HNfFXMeDlSix62+ZSu12Sj7asL5PY8vY6S

e2LUMzd0zWH4n9XTEyrO0dJE3gA7G9olBEZTOTGNFGSdbbNS06xUiQyJO9YAHbd8T6GfHUkiS0UGZgJ1

Y7DCRCCx2eXHYQLaBYjKoXoA5x9RdECqIuGTALAeDN
4R726J0WABR3YXZCwCEm/004xGUkKy8iu+8pl1lLSVe74/H4z3f4orc7zun3as5z5b4i1zEn4y9bPms7

mMD14Cw0jt57PyrRayPa/a1jgYb1Nch5tUFNveqKhS52QeZp2MKFFDaVEVCciXiF6/G+5pTRizgWQmOi

IwlClmt0ddFxY4Cznot9va/aTx+98muZdCrFoiwYv0
MjqZ7icg4MJZfwT0SqXLY7rHB2qIC6BSIpV2Ud92/i2HpjsNB0FjS3kbTVq57fDZL/Q2EmlCNfXpgvKb

BIBk672dZC33cK/WWbvj159TrDg2Eqv/NjDj8JnSdfw6ruGayPqyk8kj6Tl8pShSz9YEi0fRVvgtehQs

2LEkq3JOvL4HXTJX/mTtRIaaO+GMIpwfDA3wOk3d7r
2ThJexzT91HUHhWXml49ZWUB0At9cC9bEmh2080F22ah5pgSsSic/ZtGj3UeoO65Py2EqFeWOXdkW7/2

5tbk3+nOmStpw/pSGiUzrXXiXlBye8lTFzYbGpodZrYlL1KtKgnnRtRr4CAuOe61yJISt+Vt7fSbCHX4

5xunBjOAtqp1uzIOjx9LIX84oLaYpe+NbwAXV/d7Lq
JF4qAOjbyxeTjtTo1s28LpsduTTdmqueaNwn7r7jvpk367G8xuNUCBZHk8z82HJ2WU6rNPq+X6M24Xsp

mtHo+Lr5whXHd4HkwsmSrX13W/LMugLLl2gEhsPRbZ84FWJ50K923bAL66jhUi+mNZd7wf0xrCcbZrKF

QS8XzCqQl0S8FtwnU2TSyl8n2fhR+5gZo6OM7gx7lv
veRj/n9+whY1Ic4JkUFCCrykLmFiIJNYx27Fi3qni+/Dn51ykY8a7p4T80BGTlVKksGtapDDbq9wXimC

NPcoa6V9bcbaihnalhDFqQ22U6oCLN3x7DNimVI+9HPvcG8k5kjRzpFPiu5pFPoU2ENz12L7MgpsP0Fz

Py/0GTTskrTjQZiEVQh7FQ8u5mGJMzjToW+gHbdaZg
5bMW31FVLAk7dVLuQJn2K5ZbxS92aR1OQ77nwSAxHDE+ZOvXknvKZ/KLpzLeWlf2KZCsDudhfYcZBCpC

1bA7t6+nH+ljAfM0fQrIBqMCFvgtY15BZBZwl+xoBIODF2F1KqTYluR59p3v5TM5Vf6oK/1psoh6kyoL

RFJIv050s3Sqrxj1jOLxJ9WOagdkNijKHo0g3dRE0g
r3QEswawheCNBwEqJq5brsveeb4URlJsz7/b/Of+sMgHaGAzdgAlaRVjmvGp/vHZWaO/w1psWovDS3sY

GECZfMowsYyHq+MMSvmb2HoQJ6xzLocDlkDkq+ry5bCgV+DDykp/nJte4dxqyJw6Ysq0ybnTrYBOFKPX

IkNxgdXfywNt5RQ+7KWE880JV1gkfTQSfmuQk296Zl
MCCyci18dI/zVE8d5JvNDRJq3gPxiH0XyjyolwNVAolVFs/A0kYWUNG4gnGa8OEQd0O2zvvDm+q1tgjU

rqzPVHWn0dMjzPXRsE0mEPCa2l80iCUFixbC4mzpZZ88gRSJsfaUPTHTUQYyltfG/yTFbssMwub4oLAY

M8XE4e3TVrAuahHyDj9m0QuCMycpvgLr5P2un80aX0
LJwwZ/ZanD5baWBa+3rzOu71qO1eq+h08ttp8WgMeXiY3wLquOIKHE66buv63xrBVKiMoc8jQ4ZzJIeD

p60pKsWaUNg0qg39KE4rhmr9yC7R1s0FfmoiNkAcqUIUl33w0woVxHffNyW6e5xxvxfFKSDfouNI1KN2

Gsb0LdqATvBnqB5BeiWp+3wrbnJwg6KkIMTTTRJY3n
msVSTf/bQgasWBqKJ90tgnxOrCQJiwMNKLBqLKskY8emqeT4K6XwUREI0yPDq0sj4pDE9ojIHTp1CF0t

1B79o06xukY7eOMjrHb6ijtc+S2WS4zn4zCAHDKcSSyh1Xu76XKwYSebFpKsDYbpX2dprYxmrW/f9el0

qw4JgS09j9WOjbrc/hOtlnc1lA96n2sndjyZkNDWBr
bQ6CEveejJNjvWZA+8CpAt9z6qi2CvrAbcvh9onmxGFkoyir9OYn3tY+tJxBR3/RnpNbDtVfzyHpDKWu

D3/utBKynzKHapFOmFuSxd8sBkF1im6fCd4A1cLiafWKL7oF3MOttRAmRpISpb5zgNNLOGOvHXFvvUuB

fz4jOu/ejiLrJXbELMlJ69ybF/fRDQaX42WMsO/YgF
Nb1MR3aIc/ElCSbaoNEP3BvKvzNiGja79fhKV9Z9IfkX/KZz4jMcNK5fHUOBvXBgCnqAsfTY7PfSsH0d

pNRJRYuS0a5LbPUxWwILxzfnh1UoaB60tVdCOtXgMZxdbfrxMCK6bVW/4MO14Xci+3JiLvigPmh7rQpm

8u0QQEz19eWqRc33PrQDMaxJvfPw+fzP8lhooau03D
Muq4GtjmCQxrjMYMugznQ5cuKnZ2QbVwbedXtePEDm4Upid5bu6IgMWBF2xcm4t6WRxeQKHBRZqkUFq6

xCgy3PhU9l7S5fcXUJxl1AAvSSSGz8vx9Rze3c+3aT2aQr/Y6ubjqLF+6WrmAartwqN8CChZbCv9OJno

D4Y4/NqKfe11vXbxw4piKsE4LvZ0GlAZvl7O5C6t8Y
DrYCv6pUN2xK+lLln2v2Mo8dC6ets5p0D2rOI/np+zW0xzu09Qb5d0qfV4Mx6pD2yh7+Ey4Wv+bF34d+

qNL+gOBali+esW2q1des7gebS5kdU2urhHZqx+/J5Q5mpyREpGpUzKvqZKHjyxG1OJ0TFFa+T/4KQSlT

7RuHOPKeFh8f1TP3punonaE1V+JnQFvobqxOI/hngm
J0fpqTfWxMEpAQLxbzz6VyR9n8/Pn6vq3qe7rxBw2zmTis4qfe31YYJoLq713nqgXxx6aiqXGG+hkQSZ

BhqgJuXMmryGHPe39qr+2ma5eTHq7EGdKOKJ2DgdQ9e8r4VXp9HXd9CP3rnqGzzAHRvQo0x5S+M982tT

0fPRAmmvEmw+w0PHB1jD1+qzDyv16YLulmE4UckV/T
0VBqf/NkInxBDMg+yzt3kHkiR/O/fmMWfzFTXOSR+F04042XTqRpZF4pAJRH05BADNZUzoODN+Dd4X3Z

wsYZnZXvzeFc2F6vJkFQl1hFkQZZ6eCRJgoNTuipHrSXoeVSDNBAPJhmw4BjHf4vClq+O3IyVxAjd47v

hYMnD1f+Dl7DB13Vw1d0kmnaX6nGyx4WfhEbICSOPE
1nWzquO+mTpVPhG5MSo5w8iZLeLEjJN5+zALbQ0gwqLnaZtliLRTE4OP7ewESTmiHjM7OCTEcCoAHHPs

JB43QO85wxOEEasVp9MplIpPn+DYmwPNGiEG5fnGJqLcJja6tZulG4rzIFDZh8mnEtNDj8ui89edJ5UL

6znDm+M0Vq6IMcpyt6N2wJ6uy5zfdo9hFpFvzbu6X/
yF5zEcoS9b5Gh80Kn9/6Cv8hjEuQtkygKz1xi2YXbc8Bn289fAiVT3fmu1fbjEpolWRLceulA/00tEkX

o3lurlst+wWCXYYEefbBdYzO8giWo1tozS+RkiJUV/M41s7zrPWnvk1yMguppYBykl6Kh3faoei4aniM

yafPmW78Cp2g8bvLe7jOZRMkIK+2WI//YvLq20M90x
hM/aAxEhjIEDQlgV6xJ7skd1ksusuI1oUjkbmBl9UCxW4SzioH98yQh4FgNwOlVPoid6ncNk1jC5ctRB

CPmFbJ3iNnr4NHqd+1s33qkpHH7URo9v1JZc8GDs10lQcXcvbuwYhJCZw0QuJwkIJi11TCk2dVQocaK5

HYWORQqZJVdLXN2INdvTQKChsXf+K4RBwstjddSgkR
F1FniMCiImaOZOP68C3MWYACnCvl4OS9fGc0FaYXgvFyvPblzyDbt4gShuzVvYgJbd5c/fbyHWjlcxh7

smTKSxcyKixXCN4Rg0Yrc6+uhaKDD5p7S//+r7rpt1zYP0mTu44gjuIPnOcTJhfkp7xeE3/W/kfW+BXF

GZUtUHPvOVbK//vFR+T/hdXUeW2YA50T9gD7zqUBPl
VD6h/1yM4Bh1H5MYlTmhpQs5+3MnlpP7GOZuZfguNkws6I7e8oeUlY5zlNS2/9vhsoI9SwM/Zx28zxAy

/F4MESuaDt4l4nb3a7frXo8eHqjx8/hWM+9lQVNIxOQDUNDpn62UU1Q8Jns2IBp4WgpIl4BIH7/nSpGj

3xRP/qwx+3v03b9cnkS2BS7tcIUqx0e2Q1HZ6oeKcw
7dVLouNfB7j9ABwvA/1+L341O0djn40rxRAm5cCL8ACxZcdj8hN0W6Bw4CvvD/e39fLnCMb6uscQTgPD

5kyHpTgl8R4u8K/Iz60y3zQbJK8q1iHTfEy/KF2pNK6GHbbVnTPrvtUqp1eVlEAPOufRnQWU6FQ9dugX

cMpXnT2VJl/nDwscHE1fSEk7FPpThN6yTrlKJyuacM
iJ1FjSoahPWoN4gslVW4cjVgsD/sUfSGikHLu05V3q9L1JUYG5hu/JGYOdN5tWslGOqDYvSl+PcAKlpo

XBZjRt/2P6eGLYEDrsMAyvF39scnavJuXHcX4ou/j880z+g4AaajwKb1LQe6/w1VUkM8K++mwvW6yrpy

u5/rLlPuREmHQdbDK78iVGmI2kMKuRU0EiveufzTUA
d4hNQY83bsgvNM02NVH2yeRwy4fntEGJLWARdAi2ZNxr8Nlav19SXFT1K0zkbT5imp6hCdwPnesUTWKU

wAi8fhGVva2+/Damrnvwz6qhdHD9ZddjMwngxNfk4Jr77lL8JgoJB8fYK3WKdgDmk2QfwQz2AmSygSx0

pxjaH4aSo3y25cDhyteHebIJd03fLTcc6futsGlYot
tMv2EPR3Ve704Vo2mnpvga4tCpaO8AMo/desictH+tXk34AN6Daw3i+9gPfwlwGsc/HwjHsugItwcua8

W3fyMjXICM2kGLIjhO8Pe/h1mcO4C7ArxF/Wfikpvz7vhbXKA4R/wodIpDm0r1PJXppfT8cp4Zwyrusv

wL/dtXsNZ8wnSlMCHa6Rh/IID2KkeSO2zdAzxkVjE2
0pjWsEOk7lPQSdBtwXjiWd6gOOWHLesWiw5zE8wMy8/3YT6Vf5ENm05YkV9Tl9jOknapqsPrM8rb3Yp/

LUPaf9tJyTT6lNs0kjtznrdFMdq6MPXU+aVSdDXW/JJjr06BPxS97tDBqOwYpEUAvWY7o585JOjP2V2w

xk5HOP+N7VpEA0tJ/iZSCmP4yOBGGJT5mYBH8d3Oir
0gP6d9sICshkCzPiomrZ+4/LezIS7iltHXfNlr4Cc2UwdIG304WAsIwc+NUf9lnaj5sUs+w9+4S/ndzE

4rkKM0Qe93NXT8/13OLKFOGT52Vi7J5HL0IlK9+LG/YKFbfYgGxwTnYkG63QS+zhONHnoKrGqZyxjq0a

AVb8PZeHpttH/dQOlidCQMadBpy4Nc6Z+v2IYsIDw0
rI/Wc/tTjI8PIwz80UfEGPE6Pjd1aCBa4TnrvGCgKNQ9hthUG1XTfNFzpDyiREfeB9rHEl/1e4mVwZ0K

PZXBEj1BkJ94Ol17UMqxBcf0/bEkRumALHPPsCfzUBaey1Pt9QiaNc5y54JLP19Cp+E5TXIBaIZfxLLq

yn+Yd8J2fhJ5VzxoL2tQXbN/2wWk/WQcU+6m3fACxW
otNLFZJYZdjPXJT5kB3T3+VCHwg1ll8jv4WRxafHEFFW0LwwZHfJhTt/eKVnlTdaZS+Mrj/7bbf8R5k0

H9MGdOOPOEZzLzIwbK22UbQVlsMhnRRCLtl4ceIuOS6mNDuXvZJ6jyqlMWCjCeMZ8Vb63PkEt+KuiPqJ

5c13VnasXXPB+itz4m5qPGrkeuFqeERq8c1ZP9OR68
KEzvvMVU/2gzzC55BxfC+5UTAtWMAl3TagOLo4TVprbdXGaqlvu2N4w8YSZJ6uvEDdfV4Gj58fy2rulz

45mbMD6JYdzBhC3fNKXS0pV329OJoqfn2+2D6SWWNKrhEX0byZKxmJCRl88T9+IqZbSqb1hLI189J35m

bdc8rCB2vAEKX4XK/fNEjvXmZ6bL0VuoJui61US4d7
XE+okjLFUSIby9g1UCZZ8+tXn+5uLn9/5rWogfmQcI+OY77Bb0RfUe2OTBUz+WyEzPU60DNEi0QolZl5

5Pd9JZRUSaB596JiQXg0gsmeflqHwl2WU8otkPoa2yg9CI45yucIrmxSA5O4XJdvB2MjBAFN0Omzz8sx

1dBY22fC6e/5Gv2MRhghYgWjoIP/Z//QeV/Y0K3pvt
pbiquMybk+/lU5/4lgER5PK1lsWi1foIMurGdb7dsO5YtnSe8ikdg56+w6KJEYdrhipX+fgWzCEuyw1D

OVuAGIqNBCq2icMbR3IB9AeusYMePXlCsqoEkFTSUjzvNJqT8uMCFsJIEJTHGn/Xpe4/GGuuKr2OvsY7

qmCZQwLoBIF9yemHKm0TK+ujprVu00Q4tbM6E38moz
bMg4+gawIRMlOXF/ADcnnlBz5osegJOp/kNrjzH4evw7Q9iO4VilVwn2sK3m7enhJuEzHnIJxouXZcuC

z0Rf0k06QItd1fnlEzu0r6VG6S1O60+wPiZINyDajUcbwkDUoPw5WU1r/vIuP+dZKWL+f9PEcw9/Jd6y

eHbpaq/oRmQrRo7TQyTAbgVECYXsdSwDLM7Qy321AX
sJagNzi0UyP1slQjokw3NbSecKWpWD5x2KeYryAP4JcEJAmZF8y88wJPG9gNPvzZ+MTLCUPRfCazRwo6

dzfxa4rM5jCHbsfQrpukx8N7U+zuyPoKZqs7V7aGzOJNiaLwsXLfUdhb/sydxL85QHYcATSSI8cMlbkj

RPr452P+r8xyR/eP9Q3ecc7qNql15xfnRWoQCsV6m+
P7ra45bVMb2YL7UaZv05LdhqPXiDDmP7yRD98ZQQGoUF3CA/NUv81RNQlO8Cqi/M0PbMGOPkENvs6tDG

pimSpCMm8k3KQPOpSzpQvM7rhi4ZXkez2XFD6AAuKPwVjROSu0W3xViHDdRO1tuvK9pHQYvVzgr6aBmR

L9H7+vd6DNsBucvhOahiBCM7xupXkbiiqwTAEn6b8M
K0tViwEkIYxIWapBFE4wBB2btAh/7KoG72kklGu8G8E44XZn5D6ylazF23Vww0dWjc1ki8Vncz64GI8m

p7g97BhQ0XNWmJG6anrH+yOQKAxbymuEGTfTDoFAObecI9BPs0FrkazS/cCYqK4HUw/EHVWE6JTErnB/

TUjDpvCQMVYRzXS5n3Uu6RxMuKy2to+NCleAvkTdi/
N4r+A5KVOuoPYQnwnEJBl74yNlR+dynw3W32gBphM/LvlY0vmEz2U7AiTHeUVAP2iksWvxNa98wQBL/I

s6qZRF2BgIC+c15xxrwJc5Jip4jON8xsYkmcb/xwsfB4pJy0JpGPKYKjsmCkR2cgy3y8mXuwdqNM6CWr

7fYFk7YuFFqPT8oJOEYwRoGRQJzWHPV9o7cVI0eAGB
sXlXZsoxHMWO/PP910TFNBV2QdTmkFyN/bjlhW5Y4uqe9x1BrLzWdMV7TD4Ggt/fe8Kxd9UMn+BVYmmm

lgvp1mKVFu74xydF2lpm8YxE9ATuXXtycAasDsAFmBhkHnVDPSU7BCnA/H0BfgOHa0/6IQ0N1bkklvF1

X05UZWkNeFpsuKLCiGQK+bswH2JghKG5bpxqx1ALeH
hjvRgsDgv44Qy5lMSJ+m2O00jEN6Lqu8kRcr9MOffduxBJyJDWRuOJUc4WT341ZeTSZpzekkNeZT+/I5

O9auX30C1DlnkOEQ1ciOEHvjIhs1MuKgRtZvqWa30Lts5yLq/nXGC5Ky4UieuI+PxrNs6vH7Wj2Y1GkQ

hwZ030p6p8SteFa8rL8rO6FJwGpMI7MFjBh1srpcN8
l/Hb7JGS1VJDU3xTWlVy+gsyFwjEAwEHLefgDbc0Rbnc8ZxRXx8ar61nyrNpq1TvABXjjFETwh0khwWQ

cjtK/MoJdo698aFZ+aoGwLC0txmmKdkX1GuJmFj5PFuaisFeS0jOmQ5Pbpdpa/my3sKD0giwCj8ti/vR

mIWL0cZwp8FLY95GdKs0ck2dEBmmAcPxsuaumYbChn
kUB1+Kp5ruhGWjbOc9F9NVCPGAiUq1bdu0QPqUEKDbMm2WaOsLWBjxFMrnKELBGJU5JzNt7eOzWZnGjx

piLYRd3oX7/2KUph4A8VFeL5mgHpGPPVXYyXafx1+FYEXFAa7njrzVA6mQ/pyXbSWd+LS+0sPbZ0VNOW

y0OUO2duSqNKbcY94cAN79bRDlF2tqIx8AxdHlLYyJ
Np9ee/9Bmptdjfuh1BUIcLM0073VQQi2fhedi3XqQ3VYe4gd596bMOtwX57IqFe9jgFODks6Mq4tG7L1

o9BMt2R894uthcMwwoLFye8fvfdW4EXCmmpoR0CWVvLQQjWZGU39wB92755lmN2+KBZxnQ3VJ2zPHAEy

d7SCQvokGy3tPy50vrHlwG7MIufFs64wgkQHzU69R5
8gk6VOO8ZRZsj1yI0BUZxrEaqYxSz92Q0uzDb+dqJ9uFD0Hr4INGmJf3FVQ+jTutky75NxRPR2CN/jzE

3JHJN4HK8GgiBGQzPBcYC+WaSPxZ5NMl2pkqmtuK6cy2z44EhNgokB5fpQxQQv5nLGr9Bpmn3XXQa0FR

IInEQzvu3hC2D/IU4EeYHg98FdT7ORvT0R9+ffe71n
Kx2easx7hct9BxYn4AxYYen5ON9MpJrZHNWG4FUCsPeaq6002GoY6bAutY5nrokUAnpBN2qNqyzT2rdD

FCDr25ng7r++Fx1vawRYnicpdsD42lJXVEAcTsvZtF5ZUsK/KPY0yBqI+8jZXuG5z4Yvty1ZgftMkyHu

buqZPN9x530/u9i6AJCxlQiTr8515YIkELA/9LBYSZ
y1bnoBD4Fvi2za0FbTjMoPj1Sp72IYgXwehGXHwNEDcJgmo4+zr/9N4cu+hyou3NHW7aW4cuGqNj4EM3

NwAWh/oyb4t6XxtS9ob0jvXq2/lT5ijSBf7+5XrOJSbLCLMIYzVia/r7s+uehqX/h69KyJKWhyvbcMLh

lsxkvm6NL4j+zqe8kSfqWmMFLfcXyMJ0oVQRGTi1gg
orYYOpPIdfqb6savAw8tz4eve9paTFPDsnqkTpPnqJircaflKBZZoX/HuwqVaa4SaFxobZP+Gyy4JU3X

CZU4HEmuBNbckzOKgsVSbVJSpeQ2lGrWHoEEzxbz0sfLwmBgx0X8OSqLOMaf3b4RlIfeuEgzysiVZ3hY

ASWcACzAp/5pkA0hrgvdV2XeY7thzj4zAETAHTdqJc
52hPJi1LZWu9y1ehZ8/M+Wmm5VzukMgFOCW+K5HDfAkTPzLVtYN0c5ILUmI3XmWoWqK+KnZ+ffmnTB2m

HoxIv2h0sLcRZG6BHbgC5zU5sqCt2H6QaZdrHaZSh+gj49KinXzJa1XVDgcMx5PlupNxsidqX6myDmPQ

xo+vf1ZpWyBF7MC4iEEDSfg5KTlwgGbJEXF0dF+tVH
4Pb1NRsZXhsy/3Nwtz707H5eHWegzwE+l4e7oKojcGrpoSl0bIy7Y/tcXRnyGeWtxf3IQUzJdyFZ+0rS

EoysatjkVctvIN0lub7vZTh4nJNNrhYndytNjENfDfjS5CeoBjPnmzqJmdoStqt51TBCTQM8rF8oQyTR

XC1X3LQeyxA3VTvJqd6qC155jWKtu1HOSI8kptCMe0
hUQ3xhL9OS+sdURz7XG1w39iSUa7hvP/QY3oNRpT5Q/JDL9VoGcnfLxZkCoUVKZW/nrsQOrbMTaLESXH

dkI3dFcbWG5egBDuKBkOiy1+UXJH9AfDp+++KmTKZpZI5y3tbGibGXamQv01eg4DyivgpDp7J8beXct0

JoCwtW75SZ3FxogxIt65nppeyxjDe6o6hTfua4rqNL
plDUCize8iUcPi8ORbnLFN384NGXLOCmSl/mDS9pTCFpakhEtQiFP7qi0gPWhe31/Wp6btY+EK8miOPo

IJz31Yox5YikNDHgkiumBDezXrbZ2opxlmufkXFcXFdhhNmJH759/T1BXYkDfhbFjMCfmK0qGrFI8pr5

wwtLYqbUPVuHHhpsoBVHuqm2cYUNvKK0/FKP+ZSWra
6c1cFPgc8P800xD8g5PVcFpD6kH76aLLXT+DmfKEu81efFdjEfTVHK8l6acwFwyW7Vx16HPxJ3O4zBPd

2T+MrmmtRv/q1n/KIHViiDngg9pTtBSkI1yLwbCP4WBFFad1hsrWh9t2jD3FsKdVedouDhPirnQl7xsM

x7yHkbVme+93UP8w/PUdvfaEB9E1P58js97R37M7mr
TK4TB/FfCst521t2LhiZW/OdG5cg75gKyMRZhTbh/pJJ4rhnywP4ofuAZ3BmiC8FUNEQ7nNKLmKOmqnl

PErB8DLaR4ghLGUQLKnDBPo+whAaOtFIlqkXO/M9H4b3H165UD6JWq8IrGZT9iyflUWmm5hpe/kMYTdj

nDve9HNJRKU8FQrMnJEDfa4N68kGds3aUTIUjfH3Qe
6ofk2uIJNgVIZrrdAkHJ3Ff+lW5hShT/mzqdDNN7a4f4AZ+i70Mp2hxy8lZsgZVc0isW4iafilgGzTZI

rEKwPwcKXPtovzbljjtCj1mFof8pLLr6Ki8iv3e+Q4dfDVLH2nhPL+jwJiKnrspoYxxxrDhlu/zGe77E

rqFFwtimrBdTegYj6j5p1o0+wO8ZqDmAToHydKw2tp
0aOHWNWmXPZzFhNK3JQiTTlTrRNGBAk9OsuhCDwrZ3P/h/MZLglqnEqVmtmVjHz0P14EteDV6c/9+L7f

8oM+pYfL6iTyYaXmGnhOL51F1+Lz2v5y6KM5lBgPHvxEqcvCkp2x3e5O7Ns464/K4Lb9mJ7qh1gQtPjo

OyPPDnLDDNac5kZfysk8fN6FNVnpJKX3dQapJtiper
AU2W50E/AteaTj8FDVZ+M4aQiVlkX1b2hQi9NNVsTMViRXv+VHsVHDsfdmfFatiTeJRLhpJZ/G72X0zw

iewRovVfbxkGCWKz+5TYPi/OTKIV4FwtUqNoJLYOFQ6vkUd6vRzFkn+OREXdgUW4n2temKQnFxzMYX0w

FxdTVNDU9hYyx72ZYSeMiltQQWiohVc/Sxl4ej6Y7M
3ECppSEYQGYRzKXeZb4sHHBY7ytbW3Mfb4rnanr9LpXu7E/8oCGjT/QaDw9HhfaEsdDp7KAfHcPQxSrj

ZhYW6EBZ08fe3PH1rdCslVEopFrFBu/D0m2G2RQRz+/mrUJnua+HthRNKnkXElMi2iYd2p+MfZeifoIE

qc6O61j3VnnZyP/8zxb2PuUEHSh7ZSvqo8hf3mwyxP
DNl6I0zBrnojCsebmWOGKyHM687iYnEt+FCdhAMZ27NaByp2D3pYsHormDdeBmPYhiyS0ybzPxyEjmL8

WchOfo5vOjNjGdQdsmQ6rjIzrICo10YROeoh+5wLLm66dPuDRRllIZqgTSHjmPx3SqyGstB63T6j+VH9

ulWUJ56/CwB9X8m1yWUWLng7EE2pWv8hYluR3vKdap
e0UUMDDHarMJfqQ3IjTr4SIHwtvfikrtMOHfWGyGcnV/mPW8KYQONjEAlwJhEY94CDh3ts2nXoa7is7U

8siAZuSif3s5HE+HdAFNZ5XDpN2tWXa9Ysc+WsbDDyBXLsqgy3HV60nJy0V7H5aZd/UplLSKbnBf49Sl

jCm9snk+qMuhR1A5PXVCFUAMsSl0a9mnKUYvu7yLBa
IF18XOJSC1aLWhWVt1QfV35hg/nDJbW/YVfZBwH1CjeR3aBeCGV23n+sX/y/EBAK/lsdLIrzj+94pFRd

oceYVB1EyCAZtoF772Nct/tN0ZiHyM/fSWt0HI6OBIKfiKYk3Z8WMVthCAKVQupx1jYPXJ1Fs4t0OMG2

XMxPdwNkwsGc5HK65QeLz7Qcw4aDs3ehXBVcmCHixF
VvvMUJ/ZArmf5ncPLNdPqGF9SAL0ab/Q2BLJUnKhjvN1V0qcoMOKaqqvbaCA+yJtqrz5+8wW0R3tm+5T

iEE/YMj1Mtcy98XyWVIqucsCaPr+0h7PcFvB0D5hOkT5ftcDTXxNO4/6isCqoUmcndC2kT1W78vTWaOL

0QhBLr8E2xSHpJkL8yW98dN3hbb08dSY5tJ6YRBnpX
Y40pr4Oe12M3zSGdGNlG4B3N+qHfZ6IerZhV80wWcor68fJSU7Csrx4vD97+mrnx7Wss4a2Ni8h2cZht

m+JWNSc+AxumPxescQMg1/q8xT5ZCcU0xOL1DWgpYr6eOHtK7vJqN+ssNiZ2qQRIackiMdGh6dfQ6Fm2

Xsmk0VI9ce4pWEs/HGB5kiQlZhnFqvAop6w0QvcXLQ
EoYE7PMgzcbIVdD9RIDnD/4hnt5zi8xC4Or508CJHfl/XLp/lsEf1Gtsb6xgZs9XfyBSccAyYRhh1esr

Z12q+OXU7oCtcSjiZpeq7S8q6iYD6lYoDpl4NicNHWtXWcpveDumEWURQcSa4BRQVMc31+JYR0yP/W53

NFnUptpHhOHAhptC7LTnzXm2UrpkxZuSH72TQyYlx8
shnQyYDGM5qjbu0lnxAuVuVz2shXKV/b60vzchJDmalAoVlBSBfeAoiporLzmfMfcZQnM8n4+6P6aayf

5Tr1K7UhOeM0bkHYSg9O6awt8+AWt6dzLTeyEzCu9Pux4oLU46UWN6LAKYVSlqkcDwEaS8Dmm4wNp3DP

+KnBG1fhqAnoISHzVJtVheeE+19CbkjWaAYEaSWnvP
QRTvG/gU95UXYT+NHvWzeA/Aq8gIIJOPI+zLFXa5rfh+v1H3MVk1IItWvTbvsJ53i6AAZy5DLcgIWOmm

VglmvouvkqSCeWAAJSeIB3aLwN3tvW2J3NWjovAb8xuUNWq6oovzz6SRjk3JkFjLquqBm49GVtXrNbwm

ghndpzZ0+lP1dkGfIXi3to3sQv9Mz4fSzqYTV/Ee44
7303x27erMj+ealyPWq5N160Iyqn/d/Hw4koSM9m6ISm6Q7iCn7J0bX6PKyJM0168qVVT3ZRVHKsMicN

eXlPXirBe/dp6bGAiimStLJ7KXq0viJJ6C9CWTc6YV2xQYceQpUCN6DOrafUDEKxz0JZ8RIUtMio6CJW

CYmYwTimQ67Eq71dLl+w9NEwl6/wlMGvqftAdaNpoj
JIkd7/F10wvEtTNlLLLPpRiczamD+BiYE0MiepkUNdJoOQadVfkoiP20mID8EMrm460rWzO7/UZ/9KWb

AO6M6aZE7XqqP8EZzsj9Flba141c+qHdr/17u3fpDGGKnZmbL42mnHmUY439e1AUlysBXbmmDvm39drf

hYOXaPCCVZaGOh+Dw7Q75yg/zbp8DwASFTd1NZv4wL
DQWeNDCmIM6fbeOoJk0I9fZJp0+x0cm155fdeOTLnJRkYofzxb/8RsB2YIMrUc5uwP6Y3Quw3t0EEOEU

on5g4N3OGMb/a/5Llax0Sv8VWfd/J3fRKp6lyJQG0nrauXNzo/S75uLUbqGFa4Djj0XvFoSn7+sdyyCB

x6RybwgepFm40o+04Iu4wZ2+F3rT6+g8BVaMqojCAD
Qih0s2UgANa1YgDd9iaHwSQa5faJv5jp0f8K41qKFkdeQYFN6VFxSUQiYWzMfZwJaOXSU7eyof+FVUSJ

hp/MZ2dLFO1OuFu6tMCNkjOhnyYp2XmqmhaDNT5g+bt4LRJebl8+RY/wq23+G1qIH00dBWqh1kUnZCn9

0hRFeTqntfmjvL7xauvYivLjkfUN3wvrIfyg/V1+tL
9rl8ILMr0FN44dPNjmHANWAvd7FWcAFTbFYQZqkvOGTnvq7wDA+uXvl4SC4ylCLj3AOWQLZMMyOnLBtX

Pzb8rMSbdGq2lCd5B6ISURv0D+ZbAJxI4UbnBvN6vguMALnIDRSpphMTJiquP+r4eRTZM4frYYTxF1hH

Yk/ZWitNj/++w0FyDxAaXtJbXq8p+7Iuh5k8SJuEql
J1wzX7HXHfzJm9ihigXOR3cACfGCqvzHgTp3yGMmjgXm4R/I2c0Zo4O9/+ovXT/nDeIuILbPLWFliQmM

I5XSCCarhg8YzFLKtZqfpyRQZC9NmoK3ze4/Wrfa3Hybo7RJ1j/9atcA+r+8bCFO3pUE1LnoMmou9vIm

lAKX0GuT0uSbm67BNeyT+BGAYoSmurBRsa7+kpNCrZ
6XreTQ4Ovh92BWABe1UhouOBgVX71iKes70nnZGc0FKpZljOqm2fQ07O8ChVgxICqhXRL2WT515YmPz6

SMjCJXK+nAGpoqGMQO9++iLz6Scyi6r3PAyslsueXWOAJSN8uX+lNL3RKtZIC2QQt4cI9mlhYyHBjmej

RV/+ul2txp/fOfGJN0l3C/YPmDOcvQGkDeDcVho7He
dW5fYcGz4OffMExedC3ijU9umpGre0J8KQn6gRIzijxCoScHP+h3fZqw+Q9QKhIeuu8vwX73rABvzg77

ZJYzzHizVjO2L/UR7m6hzXiH1bQ88E6CpagF1NSOWfwu2BEcha16GANr+0uoKmZB7LmwugSirTGurFnu

a9qXBEciJacTRkHmQQ/DbHqzZw7YcnWcWP3hBlMn8l
v0DhL3bfLcb2uQ86e8w/yAhsrChhAHzvFRd7tf4YrWNMvtI6BXFMq/69L+pay6LdI22pwdpKuVvg1etH

e1LB/gKndGWhyYTuTN5ZWcrEhQMMupyGAO8z7P/NN5IANooS9bZvi9erEvY99wsTMnJ8UPlZDVUJw2zX

mj+FJ+VyqvGn8AxIWgoRSicmhdKMUEb0oCaaUV4meV
uw4tKtrAcLW6dui0yii4sKWhoJGuff5Qsb7y3Z2aS0wfgrDQSU1IcOr2ekh6vn65pBKCu0Lbq/gBAkph

/BOSCF/PE3CHOKGN3c64G/eKuM2/Ta00ahvfh6p3sMMjTq+P0z0Pj9ACB5oYICc0oTLpucCDyXqQXO+U

hg2Bwj7OvwNUVzyjP9/KrtQ4ibuLi28YIhyAkghslW
NcdIXGc7deXx6Y2rDWVQtDHVdVZba8hOmzc3It6eSFWpmj1/i41q5Js6W2zZpS22Dp5p8XaDUCkO2mLb

AruWjBknaEpWQW2bUoOlnRsWHdp3HQ7vdEsH0fLh/As1LcTYB8UrN88rsVZ4cCWch8RUMbz0jt6A9MdK

KjL8/ivcEBnDdHDJ4Z6EUxattrsTs2lgHhKq9rMCYi
k/C6giL8BW79EMQT5uE+MKRv20NAmuris6+qsHlm7RkhSowU7djRnoGHA+aJ2zJQrR3H1HhLxGYEE6RA

JHmveeOaZrCpsSap789A47Rcv9cNCCb2/+cZi2LhCa/6IYqJHcBvw+nBzy+uhNEqfSpmscgvNoZNsJWq

pjNgTNECFCdoO5pTHXOsXXVMomyKrQEElajEhxUUo9
uFYkpJ3omZsuxevHSYKpNtqLsHZDnURQwNUEW0ZPPEXncYESy6HEWCgLdQ0UvV6dZWSscjzhKK9h8FeD

Oqqxp1FlwyGVF+JCawSS9eD4yGcUrAufHg5N1c8teo9IUTEnDbPuITKm1x1Qyx64RrsOYwoiE5Ee16YF

H1YTyxhJxhVFoKGC/1Tl5vtQg/ANYOkCwv/74Kj0AE
hi85p+DxacT5XpF1ufre2DHkwf66rs61LUML+gz7bvBNuBci4fVdl03FeLAKrQrthWuud9XN+VDw8uHZ

5foZluMyiN7LlEPxVQ4WyGLxbs1iBiqO6wc9gjH42C6Oa50wxskQuYXLhdjwS/fQVZY9C7APaNAkG26T

EP1ChHhln8t+MEEWndlnd2iz9oHo3bGVnA+2HCC6Rw
gFic5D/lMq3kShBxlUSvnFOzcuivRdquIm34RnUKqav3Zl6shL8LIf5s/SX5NYrOjCi1UZCtYs0VOrxd

g7+clTpXAquK/8lCV7wPrKGeGNbLVLbkprU/1f6v4nR1Fo58w5AMEmrhw5T1B9EA1P4M08KBhshPedd9

32PynkteW3BfVFGer9oU77FUuLvsHXfehYtBzWvxu4
aOuVX3IZZpzFBykXDy4le7QercvCrWKp5E6NePA1v6xSk4k5+Pe84ryI53kvqL/SfvkKV2omsSuBFV/V

v64h+Pt6WyO63w9OB4qNH23111f0xJO1lkxMOSuuW1ROsGXIHT7+MRGLGLIc5nuD+BhCfGIiBC0paZI0

00bRHr4abbEkDL7oOWPrm8b2Yq60WRZd/7PqeZK4RL
exEs6f5ROVwjp/QgEipyTujcS8pGqCm8lmwtE9h2uJhvE+7E5h8tVNlOOh//b9zHIPFzMBbzAR+dUVtF

OuMWHLWmXDtqXjzIZ7uhv1tAaNAYCuLMXyIaVc9ZZNXvRYg9WJHzwDPSLMjh68GWHgEqWNV1i4Lkg6VM

gFxOG7yjJ3MAuGG8++xvy/CsnwuQVWQpR1fw4iYo9G
l7w8PR7zJB5+flgN5DblvergzLSPm1AGxmM5indwer+rv5g+zOU2+oAMoU/iIgChJ6lN0fuASPpUJkje

GpFobvGSPA4gdMFGE5/WbCpb6T5gMuXp1C0k8EYYY58O9aaU4yQanx9UgWaPMZsCJkEia3RKp2qs7iit

1S1DMf8qPdVqQqK7dNvPO84joG/9Yr0LPF70Fy0svj
XNWPBKvE4RHF49ebfFHD7I0Q1RzZfIfz4SiOihcK/wPPS052YKOhJplAtBEkKX/NEjq4y1rntLixBEK2

wNNCHA2noac4K2aGgpvGi7ZXcqfWSCCd3pU/Xf44cDrzToFL2qyA8kvUIC76Of9YmWkyZLdFMuU+7y8z

uWmbeyoKL7ff+jL6FrmMtrsST6tJ4pUjsUFarT8rMl
60rVbIunfat0QjP4FTMbO0rXTxe97P3e8KkEzQjXFLdHZG7YpWb4JXoKM4+0G3hXbsZh55uFrmRkc1lH

hX5NwkgZTa3aiNF01MLlhOa7FjWgXEcFibWzcFU51+4R22D/MENq2E9freXMHXlFSfxtMjtogfqCHwh0

aivR9YoreF4iKCqVevt76bwEHV2w74vQ7nrSeJQ82E
0tR6inZGn5b3Ub1TJ8tdd/dnQiY5ohZch8TTFfynjAAq8Jp+AvwhWCLPysJyN/U6fHME6cWqqOawyHQN

RW9JmI148Y3AW0snnWBUJlrH2cNq3zbiWAkcbgvUo+gARX6/DUL++kbQRWLKUTAv61aQAbBuUFTKqYzK

q+VzqI5JPOp6kTuRsahDl9JX84k+s+a7oit/homp/K
oEO4lZGEpiiz1RNYDr5/pw/iiBD2WmLoK3m1t+bJ6UoH6U8g657kjRd77A1eKS5hEa39t8pruMl2mE9S

tUGHtxkbZEiugvggIlebLrRuOmDbW4+33K9+pKRUFybxEDsWwWD+Oqut0ifOwsExSxLhMnNLp591HH1T

ZaoJYzpu6nok2gACYsN5t/AhBVs9+rZH1KJLBdxVQu
7TeVdU4SfcycQDkh/T4eZnV420RRwC76j4atS/ANcJEuAt0V5O1O2WBKb4eP+o5EmZiLWnVj2LCi+MtC

2vZ9i+LY3F0Y+6sG4uXPdbvi3z+JMlUF6jKgxYUydltzdaHs/hpdlMmjoSxSQJRZ7rbDWuyYhABUHsk/

LbotZX4idm+kzisp7c2toUVG3qU+a7z02viBeY5gq3
BDa56anGfGYJ1ZYq0T9O3e5ncLDngoZg09X5y/ZoDinYYZ9IAlARo4DewhWEGVJJhcMggqp52vKwjeTj

kITLDLJH9oRyTvfCUmacr7TtrBG7cIJMPO45BePRVzOi352h0byY1mHiU34bf5TaGMYZaohcC7uRQ9oE

bcf9d0iwj9bcz6yIwd6wyPMuylV+G667RtDfP6xeIh
B3In35d5IBGnOdpFFSAdQlC8E27S5vcZg1ns3EIBq+0xJ5AJk8i1/UV6HX/2vX/7L9C3bmb3Z13LmGl2

Xda6f4nEfZ+vcJW86161fPfo9MFcR/sizaPWJ1+9ECg4R3nIoOyJbUVmtK6nOi4/Xrl96bQEjVBvKt6n

PiIzqejg4MVfvbsEc55e1y/qCIrFpcxBuQO7Yd6hLC
8Wkvg4LwDr8y/5U6g/aY6M5S4adtY/f1/TngklwmQ+x09DAIzHX1pSc0p27ZW/TUVSakWzAJI/F9oGzr

v0MWiw3cFbSjRgN7A4JfhM8wnHo4Hp1YfYORoBpcUzXlk+pmlcnb7LKUMSZ389aewgU6GvNzo3tTExwp

VQavYg4Ayr7URuLW/o+5AmRxqhEPfWEyYwGeyMdicx
WXxxIek/vRT4Vvf3oYhAwIzqZJBsOZoR/FtvLso2wNbJ80rAWgHevYUwKi3ojBvu/4pMpSvpPkfOSbVP

xRX4+Qr0fA0h2mq4eU0Edhcz6I4tPgdsW1ryyt5CB9qbsTa8b2l+SO9dINcVcmfYjrmww+U+ev/dVnHS

ZovUJyo/jEukJkw0ciwh3uh18gXr1D6I2z9NWCjdV+
YUhFIqUwH4i2j7po0RL9ynr+n7drV+3mfV7xBX6ksI0ad38NR4F05lqEPRRVW35/lqii1lvaLfXws0h4

imTwjr/Bc6qt7E/iqKZyK5qetWD1g3Dfm/JLnF5uYChW8+fFmzO58/hqt6DSxw4Gt36JaBCx+z/4gsrI

dbKrLcLPYjW+I/2IsM+HqZLX+6mtclioPyCdH3I+2F
rYdg7VS3zDoBcQK+44RrgSKUHE+2PzCgDS59lKRMCH0cTzu5DtcFpJFmIc5QmTQ8wM7u+tiYx+Wtgm2t

mh5nepcnLvqideOmsat15npuXTsC//9F1JpBu+8FfDu1zjPFQ0hw5iZJ3zrU2GeM/9SnCMGS8TgkONVU

aWEJGPmdNfU8ihlh5peB1H7mkyEiyE7eJEbsXBYHnb
xlJGq+CdPkjaayhts0AYCMamqhtElMSXuT2cms0H8YKC0VWOWpxgb8FEotYSCdydbRK0M+ixjQ5GewMK

WJwra7iIaRAQaO0j3ASwq89ghJoiUu6VxHUWd21HufZK2mLntzNyiIU83a+/g6oGhc6nFFdAtlyYcvCf

EQeEHm9bhuRFshKRq/TvooBJdCdkhbH/gwtSQWJNuJ
sErExm3Gct6zWUfab7lbkLJ3da4iFav+K25RQZzki1Wqq2UDU8Xd7ouHUYP3B9ppmgRS++rQgSw88w8J

1I7afJljNkmWqEb/7xfAO9mmeY9JPP//PQi0HajrWYG95tyEmBXqcLW4+FvY5uAiZNQrGV8ZYBv5lCyN

/xm4hqvfuYufm3S+2+G94migrvB+mQvTF3W047zr7E
OUeGx8d3xpoF9lSaRL2yJafUh8hcm1/l7JCxjPHHXs/twt7XBJIc/VVtU15GaxEMIQf2le0Loa11OjZ3

x7JO2o2Tccyk126EQmw+HgMIf7EiIl8ICBxWBP8ifOBAz9DpEU7z/+17wzRG9wFAcjHvOefXONlLr2IZ

Hq3qW0Ka4HXs4L8QNJ1pWpJ3aixV2/MlqkKpWX4hkd
7ADr0qRuYtHzH2PtJmmik3zMei26xRf06u8RkA7FrrYXAUajIOO1kfHiw7IVM+g/uzjluiVZ0qe7wJzn

GAGBm1XbGrlNbq9u84n6HzDLqS1U01Z5KbgnihP/uSi33ndgaiAXEulnNeI8I5nus1Acnqpax+FJ7UfH

/h1KKjg8nB3L2nmaWPrsE+AOcgvGtRj8aI9caie9by
IjYvngGBkLet3mmGSZvMxQR9ZkAXn8mzp0OKIQ+inMjfG6bD4FTruyLSsQ33DzEjwgUIs3FqZg0jJpWi

wNSXy0ScaauKb5+rs/Viet/TEhYv0+LxHfkJI+EjZlO3hq/w1pOvSROzf4l6+/EhGR75TuivuXgmYLdzHZ

SA93UQ68EKuP3RsTA5gH/EN0tqSGWlOrd9ckqcfoRD
zT4JnLR01MYqeuT54Fi05lo6esdS+nVhpZsr86+QKvxUt0yQqKnnTkx5iqjtpNWwKPrHzB6Emrgh6VBo

bxbsXAepZThwrPwlFkZcMyevIkt4muhEhrdxwA+zcVooGtVro3aUXdwDWX3xx4febRbpiRMBskhpCAuP

bH4A8I0Q0opgXyKZwvjoqeNwt/pfgpL3/h1I0lTqmK
2fUGIZP2QP8Wdk2qjEPjUzk8tIFXzlZu4u/4o2lnSaW7vsqAt8iR2u3erdjEBx7GglvEon1996fWJ/G0

NqXsBGZ7n6s2vkwCALDVLi/bkZzEC97+s7MGBI6xsQGBnu6keguPLqhTQkBKzbfZxM50nWlPA9MJmc6q

ZBn25b/jwLSOjQxg8KR8gzkD90yXA8IfZ11zHORbR0
T5Zu2KEMQHi8ZzMfhtbe04ajM0Qnk6zJXUbzjZLowkpi27q9tj96tAoB/FAS5fg6rp2FQ1wTWwkHtpmZ

BRF0e3xHfqx76U1dikeco80Gzu6KIYmVZf7IF9SgI6F/Eya4gxc3TlBr3bJxCk8tGly1iwbfiAuhyZKg

DxtF8S5PZvso/UsqbGE6YAUCRuNTee/HoPw35XFwya
TBtSMWZ1dOlHrmv9OZRSJSdpqz6GmT16yoUZ67twz8wZOJikaO+QnGPps/CTVOIq5ciOVZZUtrbjscIx

fpgrEdoN0Wk6Qob4KyDulotqjcwDWvZrX/cnMmo6L6JfndEicZ6MzCgeEl+KCuUHZs/9ulahkUXPz8/t

e05M+OZl67TnhOTDDUzbrrMANu4yUhfr2czHZIIFQN
ouo5/t3yRWQ7Lncuy+YY4nJg7k7yryUEP8PDS6nJseURBrPMMDHzwy3QT64Iz0mjK1/QHdHNmDQ2fp+a

YQmBXB37VC23iHCj6CeBC3Kp7vBhLGXRjPECZfnvbWhGdE6rAnT9iSB5RzkX0Y7o82m+7CLOYphTTafs

/6x/hpkJPifmZ8w++wy2q07S81BiDeAH51l2tsRXF7
53IJaxIJfk8nyCBtNNVMgROOtMY1rubfwyiv8i3gLzdUH6U5vvQW5n65x77lEGq73ABCaw+5o8Y4Af3F

u1uJntEzuDO7ihR1HknOYXsprEmSCbnhAAIVzPGw/HAklm/1NEOOKMc/b0504B3fR+7yyoJ0l8WK436J

OPqJ7arTfeTogif5lO1h/E1/P4PTplokp/fRnbfNjT
maVYaFeosj8fXNbo+VjOTxzBAc/OzVvcF4Gcwrtb4mtlwy7tMlptjEetTatF3QY2F87FvYzeaAaF8gMf

8iXMNbtx2HgfHR6Z/Rqec2qOyEV4IHtj1QcloxyA4n4omyKYvZbPMk+Hbh9ii8Z4aja8PqTihr1i9yYl

bhFcesJs0DFyZKE2/u86ViHg3/7/tY3/b7aR+WDtfD
zkqW+q3nmdKhwp8fUhrWeSGAiwDk4dQjiCEYE5V/1L+C8+3apKsc4zIn/wSor7ij6busW8RgPxAOkEoG

RrQZEBw3TaU/XdpeiBNAuDuP5Uf/bmAizO8XXYDc+CROnf3a9Ql7QI/KP9W8tSiqkfbQpCo8a8z5xPdx

ZcG7mroFZ6NwMv4EBb+ckU9GVCnZXKH4XooOKGrtCJ
H0cQiqmYsKOHF2pi2YfF/cIFHBHG6b1x85r+K87/3TidUampEOPQv94j/sKEvyUmloCp6eDD1DjX7svC

EXtVZ7MCOaIoFLH45l3SOLBA6Upw4k8WbMBkS44FkLZkfOx1PHBnSVHu8y0qAJFtejLAd8EwysAv65OH

v+H8NhVOjGxKPF3e+lTFfHz8TbbfBU2FtyG68UrPj2
ubsNl7uqtVP9aa9fWYmhtT0t1kiWRLE084VE5zoX/tlIOwB/PX9/C0JVlzoDz/Z32aeR7L9JsfV28og7

XCdJk1D1IUtuSGciu8GSunwllBRAqBdYlIIPatZMY2IM4DEW3fyp3L4m4rI24EEGto20PhB/iqJ6dBFl

ysNOI3UtjQmTap5UgI+wbQXBN6kqDPE+HVSPLE5F9Q
F5+Fcr9rntnMzJ0FTV7/MqZ/KBjXUs1Aq+Fm8J1qCEm4NxfQqt155oVixUVfdumcwlisQU8xG4VHSrt5

61/T5Mnn9FN8iNggci/Or1Ga4125bEhssv/ChPfIgbev6DOJF7CqrEMi/wddfxi/mkgLP0dtXkbdNdhb

Tbus5gu7L8SRFfeOoauz6z39Tn66pLY546AnhUbVCR
JnStNvJFxIcXn1LDld3uXnnZ8RsD/u43g4L+k3cb7oNmgfhsh/Ye+/vtk9lmoSOZdQomF4MfJjOSoxSI

PUJLbWBvfwpZvYDPeAQCTfPyqcVBbelRzFTrADXgwdqLv28Umciu/mvfjO+LP370n/mjEl0kg1mzvohH

empg8BdyA0KiGMWf1Rx8KyxmR3a6z6AKys5+aDCiY3
5e+bqudBlnJusVyzZCH7wUERY1er5skpWfAs3jc8fC7AODk2WQtn6PNt45UJQenTlaO44KzITSz7ac6k

rqCOpdLpBpO8h4G3qFpsMK96unT8PVNK3imhs0nOgOBsfnjb8/cPx5hXDzLnXAJ18sfhVyksi3tgSo5q

m6Fa6B2uw4YslbU0GN5ezoVp/MqwJeWmdDejDEP/ne
1xCcpUdOZsYJ394/63AS5diB3RsgJPVCMs6vo7ofYehsnz33AEH7x633K2O5Bhr4xtiVZTcfpS3GqVqq

BDTQbKZYb9oUqIZvP7G1ZreHsaAZeBqBijmL1Em1xrj+bDRZ4/s+V4gpu2TlIRrwCUPhNGQuwnASjg+Y

iX4vtAHBgjLgeP3PSH1UWDtihBQdHsyQt5bNviyAwx
Dq5frU1ePDRbvYu/FtkNvVrL6fSAiHT4uFz+AH4IpJQ2cxo873hdYqAO24FQpYeePjnnWkug0utia1US

yLTfI3Uc0SRqL7k1z5AfdBQJp0T0yFOYFefKJ8x0bP0r9kJ9v2nwp+HB3UC9aXXUJ7N5qi55qF2GQOL2

lDIN+esu6aRarSAXbfhIeX+wvefRRbEXQWFP7r6+eM
xIpDBlugSA1rZm147RA11+V5c80IaYb0E1PA0wyXHSZ+213HirRCkhJgeSnPbnBoX2iSrgbzK2mLlFpz

18MbITaOkrdgrt0BJZyaPK0Q9/mdV6shaOBi+MdNJSrS0WJignU6nhujdLO+7WP07Tcot7st8KkiSVm6

IPpTv8TxaH/OZWOB0PxR+p53hmA0obntCGiRhmGRwm
GD8DWhJAspwz65+G/lPo8KS+eMx5iluBkV1clY8K47nQlGHk29rzGpgjWwp3t/WK1b1GpP+F0KIk1D2s

zYyTdfLii+/yl81X0QgOApXcqDYzURT/N1JlDnGL3Wb0Q5dQ4/dEaKS2zzftinlYX+J73FNExGNg6EFc

QIYHAlZuJNgYecfpJDiIpjNvmnYhf1XMOVncaBYF9O
0SeFvOrJrcNZW5FvCC/+2oB0gGzkYQPgufzVxuAktU+VFZpT7sJwu3x3gQswumuqzHlS+hT0fZKWYYZh

3Any4hN7+/KGdK5sgwazhtGY5dxctxrKbqlyH5ZXFXHV0b7TyCrA7TN74ArOvcjX7wywMxTLzltA1i+e

u4bFOOl2MjJ3nUHd5HM3UBLZ59kACXJX/tjZsgUUUv
3Zt0nOhG+FuMTM5Y/fWqrDmBUww9T+4NZ8dcsnZOfaEsjeP8uZ6i6d/2P8ynNV06uPiqHswSjaEcarJt

V4Dw3N5a+YxagGafBJWIANmX3ENC2XWWIeTa75DiWzSpW7avaCXeuC3TR/li7ixmRu/MtIy1XrW8EEMI

hjmjZSZItqVXQzEfL/qKexDbjgObmc+XojUUI9InyL
8zGq1VtyFoaKmQ3A/vhoR6YQyVtb6OXnn/n4MDl0oxFARNVEL+0cvAF1zeGBUZ9cqo4nEWze+879yeN1

UJZtyh+zPB+mvxsffu/hpF4fPA0VtKlL0tmo3SGd6xds+VzbLfKSzorKD3NISpR7vPMyzOFczHJwaEvk

aAfvLSm74rR+0Ghbu/n0/TGXuP7aUOSULYkLpL3dr7
6cwVNAqtxrqKxrT+XoRJfqdvSfj/zqEuCNjwQnh1yI9mK8vgbyc3HYo3IavIPF30/S1R7DhdYVRM6VsG

jnjQx3duqg3aq5g2w4rd+iqJg+MHO11X66M5eZYz7XBUH+UprdRpblfNZEMm6rWoaiIfD4W6h3FD3oOa

OFMYc598MPmHwK0vRvemMfOKBU74HiBgnlqMY1H83i
AXdr2bk6UvlCgo3TRtiDoOxmQrcQJ9yYn4ps8UCI3gp4GY6GPgYvMbl4U2Z5/nZR1+Hqhyr90AzpVW0l

1z36BeApFDBQCbqEoM0dYu2bGq8MXlCSHZlq8XupZ6H8Pk4nGH3cXdY12ZglaAFWXg2ukEveBgGYfamp

x/4M+F50wqOBtvb0riAoWHtLmkBbCf81zYvFVyKmqH
qF6ImX12iCiyUyPgielDPfAsDLK6aj1LtZDkB1lbng02FWJjac8DGYB5Q/EFPlVQR3Z1qZtOZ75zkigg

/63IltQpKHmhBo81Ybu5bBQpZHDXVnmmDzpCo/k+dYkSNo2xVkGXpabselAHayeZTRtpvl4KYRmOkUt+

b4kUKIc4s4ehfwmU/ja5i5JNcG0LVa8wMY+OQwdba8
QKlfItxB5Rg/3UUDX+5uOYStBdZASi2x1aUOv99IWeVNrzVRtARzlXR0bhzW99meV9jZptsAkIWAG6W7

wQzD78RF9x+j+4D7j5ljhMijsbwVvn77+FMolwQgHluDMnMmGDdmb/NK6MENvwCgXBj2wVVu3lh7c8YP

VAncgVPXbSJ3jWpd15YnwvTYwuBnLTG3QTWhYMkJ56
o0MYx1KHreTp98nLIILTklW8dY7e9Hv2CRA2RfAF+9qdLd9nB/LKWfWt7DKB4+stBpWn0IalsF5xkNLe

C2C7vJi+EVCTosguO/Zk+Qr1k6V7FS7bVDUcCmYR7etRWJIuZdZiT//b+dahmiwBZwNc5EfeAREE1E0/

41fzEozRNrmWmTm5XkyIk6C6fPtMuBj/6LZjPSFPsb
vbFUkm1Jt7I5gfg6jtwYXzV5QmQwnbIWBp0glOPyxDQOa5P9KC+JdA9PeCs48qiFBGODZSOr2x+MBYM1

uFhCoybNOw5PYdnsCX8aq9tscYu46fnezRqJiNWZPf6fG8HYOmJvcLPmQtRTztFus7dx9tCcxMzb9RD2

m4cDHcgG9qxKfHKY77+rFI7lPJxzHUVfQqwGrWggv2
XZFrnccwRfpE2ddK5Lfgzi2u84/yV68+csUus72QqMbP2Bvd82TlfXNk0UZGthL/T0z7s0QvMIdjq1xL

ZbUHx7knEZDKrjmxdsFdY75uVXiQFuP8KWP9xoK0TUJFcbiCRY8+G44XrGmn98HqK4JGJj3MMLNieDd7

8LQGKWW6B1Cmn9+SR8gyhqm8tLZ2piqPtrmgDRpUHA
lFMJt4p15y0eGumpPtt+xcyk1ZzstQpiD11jAHfjlw7Tne1KOZqfPfQSOEw/dj968iR9NC+ZrY3Z4lq/

tdVcxyNdzcPuc54jlfSW9eST5kVnQAsOO5S4UD+xuhFm6tjMgmnWsIsqfwe6EscoXE04+KXeYwnlZQyA

2HZo89kjpiIdu+i/SjSIU62F0xJUugdS90BL2AoVXV
/z1R3h3ch/xneyXbbjC115YOI4AuthCQ73zedL3NLN/p3UZfUyEpPTrb6TLTqOW/bg6p+maGy4f09NKl

B8BhpcC37jd7fJLMSEaPPCxtQxc5l2f+ySJUF/FJdWOyXxz382vvL/zI1l7bdia4iCUb8xqj2bEd+k5s

EfE1BCY6SYtGT73SV3EeEFz4rLd5G8CQTzY33qVcW5
IRTmzjQw9r5Hpyi32nH0ejFXsx3zTL0YqHKGLO8zKpnwi5xMmiFD2JqGYBYPWmuryUn3ngVA4Gspuzf7

yG7XY7LM98P47IeyA1N/jmcY/BFGJpRDZMr+Ej3jCJCBdEHKCHTnpXosYF7niDyy6tM8YcTCXMsHapCo

bDy/UOmR98tq3/ikymLOgp+Mqw3jSG6ZAtnRo3ZoAI
kxIOljyrsusKEtS+f98XmYU1Ku/oV9j7dErrQ295b6FWsmuOg7qsDjp8ecPSa8BjChRJOyQmoV+x5xJa

2oOxUzAaDQCZgoIVfQ9Sdvd/Y456/m/53/I/U5coxeAtbOsbQUKAEtXdp32geF54e6Vq9d8t+nr3xA/r

lyE3yG3OsCNN6s5pfeaC3IsQnX0MzMFh/NNBWqjI53
e48belnNDD9ZObOW5sen6BkojIX/iNiegTlFdV64AIq59oHgDRY+z606HrCt8KC3uX8gUfrSufLlL3Qw

dvHxcZb+zSPLUAXMxe4hly5XCDsqAqMENFYyYFg8w0jrTHfCXTs8M9hV9f46iz/O+3pu6ssXxP7SE+Wd

la2K/SwjmGSx7q+xx/zuyd26Sm6gzL6YQgBce7RPSF
1WtJ/Axq8Hs7UJy1X2Eh7q7/xhuNdOGj83bzNmrqFrHecjtVYbwqivfBs44WpHS9fDWuZF6G9NZt1kUK

4nNAYuc0cfqhac0ouGse3p+KpeuCMErZvl5Ahj7mimkZrQZnEYNcoyE6qs60y7TP/V5CR/10afSJlGBQ

b+Z49vyn/XQ7FlIxcTzQm20zuM4hmJJ0nR3K3hIJZy
RywQleb1MI/CoSxes4yx55Bf6TSNqWJ7oYRto+DM8oaHWpi+/onrib+lK8ZVi/WMJB7pTE9fCjgac2Yk

Hm8Joedyx3BjwdOIoiNFxdU1SfuTfADLAYCEG9jv//10zGz7ySxzbV8IlpTuy4dcc24+/rq04NcYuyPT

B+odbClJ8QRY0+pLqzbwPx3TRVoPBzjtOQkeGHvff9
eNlq5KbcI8mUwebltEouaoz2kjlymGCLTlFbI5XvXuQcpv1ymW7PdtFZecclZUnlj9+eQh/fdeLY6ZbV

cOi0bkdn6BxdQrx4CtHDhxbMNZTuYUnlkLw4Jv2OKAz7HQeDYgQSzpAfP6j4seIpITfBmGj9q4s8JXVf

OAlRQzJSPucF8PscYKoA1mjUNPFkjWkP1jV+b87jsz
VuAKoZ0Cfqba+0X9JKzwwlP7vuH0PqLc55XyQNw7nHMd8i5kkOUXjqjUF3UNsWFsYf3kKtkNT+g0aRDy

1UUouYu2NVNYlm8q4KWigstBUovNzggeOlkqpX+++oOQQiLCnrJUgAb7POdmEQfVgshyVGccaro00aX1

Mq0ag0edOIr7U5ELQMl84Q4j134LFCxSB0DnacLaJF
Dxuo9q4uuwWw1GnrIBB/+B8lRTNZ80XDi7XkauWkshAqtK+kw6X2unHhHlK01NzrtB9SYaoxjZSAd1E/

ZgGDhOCoa7nSuSbBS4o0P9TguXv7dKwm/3wBVdZynm6jn1x6dpzJAg3NuvD8RVykobsTdwRKYeAs/UIV

T2TuefDRozl6TsC5sZJctllNktbX6VPlQhBPYUhxk1
ET9/BzcPpu9VufSEY1is4yMbbdM5ROoJXJ7L9YWF8oeC1FvEpzR0bF97Hp00tHc0GHcwdSHC2/kZ1Zqp

M5dXyWrUs01eD3iEfvYOueKTbS9KtRXVQu3k7QtZTA6qwbOKNved139Q3KnK2aCuILxnEuoSyjJcNvus

oKQLFl0Bn9oPPug+tQ9skBA5eRXtgqo08OJo1o4Owd
mzf6GdSvOY+5E9CC2gs3PYfuePj58c2CvEtXQbNx0UO7+FeEKPjj3vTu/3d1ocf3WoHL+LKZCeKn/w90

tQcIrZDTCqPXYOzIyGekIKGNfTkNcu2aWXFGIMRVIznci/T/OtwZ9+b4izPHEwmBM7nD2zAacgeCy4s1

xql8B2YsHq5KpQmrA20XwZ/J5B7OJWUAuLJKUIPvR+
Qkg3oBe3w0iUYtn9+qDvmd+PVLE1fKWYkEWGEb1NCrPGgP1qpRqY/mdtLceUVzdCCQ6VY3NrYUCdgDFp

DyUSiZkRgu+MC3+1dSP5QebCw5NHVnUIJ1Y0Cu4NQ36tJvdNyUjr0zmCMSWk2m+ChDs8FNNbix8qJ+gQ

3lC2jdE9C4+az/DuvM1THzoFFIjlgFJMlVSmDwWJv3
rAnJozR7J1lkYzX23VQr6xfBY+4oRVNWcEUoI6jU/saflUuWuDkuE6mxvT58/ABLJjFzd58MbzCFzgYr

uegEIsuLDLXlBxUDBU4Ti4sUfBpPUOuMB/ldtYVLHf5Lb/eSLqoyUWOW5N06MIrSWI2v1J4+qlULoaJI

m7sr10ST1BDp24Ac7+iFW5v0gy2p/UKXQvFi5aR+YX
G91lnjyOi8D+0DxrHlOw+DXPVeMXFrjA1OvSwhQxGhTo4CcngMDzg+s5UQVcVz7MjJYz4xeV2qu/5nFx

hgfx1bCG0GsB/gcYESavW+w8Ub+v2XpNmZxqjLPPVAWlfga8/Et/8X/HvHE/Bu7e4ha1d+pnRQEk+Tori

WKuwvleB71ebiZAjgaYn6Lf2jlJ8HAo3L844MgS2O0
48F22miYryGMdoE9kKDX/TdXjhn6rdNV8fPSbzr2Gpcx0zQQSF0Bb0QpsOYjH4uNMitpQedG2Glkpv+h

x/vGyotLJ4C05wdaLtPvu//I8ZKz38u5Pc1N7bt5IlDS7qHJh00I0RJjH5dlgPPP5SuNk29CrcW1b9Qt

MwyBDvZwezQ5XuVlrbKdbEXdWMheOdkMgWu5S2ld60
aPa/ymxRM0+mkp+ODtW721e4/579Mdg1yZB6EkGAPAl+0Rl4cyCCcFg6ThnTIyKMFUK+FqcRAsSlYndw

6izWIaJ5NRS2oVYvNXTISIhu97bWA5286zLWle3dYNy6hi/zdYLSyXTUNApKr3XM34mX6VH8YPC5YHNC

yGHSf+q0eq6SWZa0QA4pQeUAOrr6Xg5Ifh5iNX/uT3
7zz7e86OJ310yBvbhDAHpUHElToTWKY47lKkI+DAkvtyo2qgjE+gxmZBD3Y/oVDuy481b21quz9u4rwa

jfhoy8ummFHHwRO4Is3w7CWX9BoSMEqR6G8Mn5uBMokxPXWgJimq7/PjS2jhwL/hXNZ68XG2F6LDP46k

Jqjkv0eLRNFtpE266jphm8SMEreU8bn3iS42R9jlNT
Fx3BL05DnhqYdTy+2NMSoos343aQ48+N+6NN1Y8y7tOg1qzNKu5ll5N6gQPXtnVsoyiYxlD/dkxT1D7I

K+tWMQG7awAkZmWNn/tyUuIAMXFWpMBmxNjIT/iVyYRxFCbMDyrsdQPEwkfptWAs6n0o5fH/8P7xmjXo

zCh63dj/uOn/55cVYOAPRW2N9yK8JF8iKR6Z7+bzks
77Zhck/HVvEjA/NM4TuU6xEKzcLB5+qW5bM+bxp9+eag1U0dgs7eUJPmsyFyAF8vQrDfnWc90OjijrLk

X3sfDbfg+ZVIfLYvdUeMxczpZeHGQHar7ybaQpBsKwUwBHk2blVDOvswXkqsSr/Uw5lp18tV1dNLtwqv

dv+jm8c9KrnWkx5J07CxbVNo+D20UjfyYuxLNWg9Mx
h7OkdICzD7PWWp64O8pR8Pd4PNFSKS4iGCnj1lK4/AW/PhzApnMsfKC5Kz/kvtLPX2kMMUF30AicNnyV

juSNcgbQ8kRdiwEwSthdXfn2mZwZHxKR3dKQrHy50bGTMnh3iL4vOwF4YKoM0KrbN9RImblzj5KCISDv

p2eOwJQjja526IWsVLnoNdjt7jdasdSop66jGLBckz
4wqi5g1gPPf9zDEPQQYfysjiLldHsMVcvydu0yKzpWVuuLI/DFF899Ar+nSnQOTtHIf2fjA9PVhsYkh0

5y75TaZf5Y1ehB99MDikzwHAUZZioROILi3aYMNKCeEtzgrHoroWfhDkyswY6Lo5GQNV/7751lssxhiY

yMWrmC9kf/GoeU0iWcTg9qHQqCRCyyKmfDonAy+N6K
Jul4aDFGQ0u5UzUAiP4dulNrLLpVGmAPanAtgy4b0rM2ExFDcw3S/vXqoU//XsoHtmMMsc2g3BieGa8Q

qNiftvp2tdn2tuLFLr5pHxzo47sViF9uEUji9ORfaUnqN0dOo/c3WVhPaiUVLMUhDXjQ65V7cNob6qSk

BPk/8TJ39uZ1pm6iphp/u5Y7xm9eU8AnW8lngboG+n
X36PVLRRY6AprXkA9oR3Ex/vzkxsBJG/99LyB5PveGmY3K0L9joeljOraAhR2KlywFCrSJ1tdrpZTYHa

td4qPfVzesnPo6VocGzofbqZ8DGtoqJBbGINdlGbufUi5mYf8rUUQ85tlklXHfOGrs2MsHiYPiC4nAjK

/qDVXhKKO8/vVvvmsft459RqhMmKmX2IZydTPn/AIC
URjU69CmfNB88K2tkazB4MZ4x88CCtBBRdpV8lYvyPhzwTatz89ZmUvnqbicvSgsOYJdUKhmt+5rN4hV

rLXF28qYTkcXzwLcENk+O/bZCAW6xFtAwSroDOC92kHP1W7wNPF8dY3gpagSKJkSFgtfeCoWAqAYqnaE

ZqT36bU39WcvwGlRUC1//1FXebtGMUKKbRLNIBrr/E
ipNPnbcrURU4R/zXtF60mKHzvxhWUw5EDbEutHTsg5+e4BumBvXoEoByRAmSN2IQcSRnsMsMnLk2NosD

5Yk0fFxsiby68m8tw23s9KAzdB+zCaXdwOqdk/fE+RzwO94mdXTaMgllLfmCg0w0sfpk+Yz6iQv2FLoW

WsFLPbxQXex8awfj1PJ2qBb8i5t4jQcpb5ji3hdqGL
KHfgDo0gPXELWvhQxq/MWh/l2WJciYKqsEZlB9bpv2UzoqkqXhtxtVFPbqy2NdexAjUaeK4PW2yml9AN

PBUajXoBsSuKwwYnnoOo/4n7qrn4CblBH2R5VroiM2/ZbTA6VOik02ZGgYV2oen7o7b5MZq6xxNiUF0Q

JOTayU7XhwM7YHH+/GSHNNKmhhq1jy+6MfeNRFCW9b
jUF7lfxDqem2eG2wFA887TDIaXHghdzW7jENl6DA4gPEPFQk1srQYdK9Wx2ibfbX/DNe/f38HweCJzhL

ItqM+Cp01Rg6tVWeNaQpneIqegq+5WM4i4hrU9xG6SqVNMxtceoqqiaNg/WgKaVbQ0yKMC+1ndC9GqzO

hU7JiaYP2SDW4TKkKcbd9DqLgGvLI8HV4f3a1m/7bt
/rWDwI9zu+P7zFK0VVEtLAk6YrK4jcEykJvxZPHhciutLi0zGUpeNzMrWtuSqmqG8pk8Q4BPNt4ewNhI

sc/VMP/Q2u72Qac9lt/13ziKdiwKu+u8Rm0gGmqXkOLG4WF8tdMf4Th9Iix6sRpsdTtaYLFXnwYEvxr9

gXBawxpVIkzB6qir7OCqfh5HLWKUpe6ZD/5dLFCgq+
O268DFsDZISzyv4CI2Yy5B2W38DvrzD/rRZtiL55dPTkydHOBLvSBNNPznxTqjPEtcZFjSBFOQfHgSR9

yRp0B7ywmxsOFqvDRcjBfehicH0uHPKKfqtLff7kwtOi0yB8VR++ejOluk6Y0C6QdLxNUis3Mhe2x7o/

uCPp6FewDXDDmQ/N1T7VdNbcFIdiWKLrgTNL/Yenny+4
9l2u4t1T23gKGy46pwt+vMvi5t26BOfqa/7fDTypB6uO0Wggkga93ulze0mzs2nxAPnK4xvjx/T1zg2t

fubxsbtQ4gKM3fY6LgYloptE1dgMeyIllLWx9fdWOgvUI4B1mVyKgAbE6EhBCBhDGl5efnmyB93kdmxT

o6AaLCfla/zvpUVgvc6DlLpuhmXklgJStk0g/ikFCi
C6wP70VhuvSThlpULxLiHL448m90qRWCxLQWtJ3V3ug6MoupmN1oKz8tY/sqxKb5DvIc4QxEkORnu60E

1sdpBb+OXUKx66v+IRbne3mJcy0dXKm2l0YR4+Gabbi/UtYH7baP1u0BrGvvI0MLyGuEUW/T7AsDiAZ+JT

rrN8DDlv0ikXngpnXPWco4SP3zzongagejLubbW9kx
VRlyD0qpsPJE7gxMjJqe42v/J9jh1IhmzRqIpxAPwRJ32zLC59+Xh/qCOsOM7SA9srYOOhO2LsWOpGWN

u8VWX2wW6xwHAisZB3VXL0B1wprGQdDhfs9aR/nfHr1sjFnKd8B9ii8L6FKpZRL6R3VivYuY3E35d8CI

l0rJ/7amXlof92Zeu/mlgWbA8+0XVy7aZeC3sbIha9
2OwiA30piTzpHYyknqQWuBIYefHhk8mJ+KFAegTS+VfiK6g3uibi2OL61Dr/PXJjic42rxDrv7L1p1nM

yi53ZTlSIrK6wUx81Q6qz9OZUWpjx60mtIHEIqgt3YOp3GKeiiIbVtCtjAThK9V50a//40orelWKW6Sc

0tlJ7UcNqarrat/74lSPNxfdp+W2uGtyeeXnwS59vH
p6sN8YkUw/AxMjW5R94On1OAtLwGoyP2/Bifkb+GHtkOjQ5eBM2lZIdZJ6ID3Sz1clLeNfEgZjhXQ+nG

wz6t9NkwieWyI3B47WHz1Z7qyfIY+j/Seol99U0+RYFS54RvgpSmFHG0EbqzCp55TJgnHoaVsMmSZyeg

SGVJM84okoQn3k2GVm/r15d3/WF5EB5fkPdqDvvhOP
joPYYvoOAwS/p12cawzxsR8IC9xWcn+FZRv+3BI9yRyXFX6V2uiS1xbHvkQDqFWWzPYnymLW1Vxs5rCa

m8XVGutu4hQ04H2wRQEcptHXJjpCORP7jkqQMio/qzZjSazBta/BpwVlh7RufHuD+h+N3+q91BtTaOZF

toIs+Joaquin/fkl2nATQOWmtI4hBnbfnAdiPFDPSgFOi4
+GacBvHg+10wPVHKPGIXAx2v42stxU1mDa/AxENEpnqCUI0bX+ZYAvxslQmJBoW1WRODgzQUBsT7U3fi

zW9srjohno9STo+Y7XVOMuG/PHhToyxvQ9CQ7XTEKPBAP8xZETM9NQdPhBv4a8H1XbHtEIcKyn9/pfEp

Eirs3Z7N4wWlLO1N5m9tHlPxH3e6O0xGSdOype9osj
hgDic1N4AR97fumNqW0qHuC5LCEEmExXzM9y5ez4x/RbOfmtoynOc42aWyaz9zcJ9gfiS+hJt252cp05

pE44F4aIUoqmGqR4tIXLfK/E/1POF/xv+w8XmWs8yk+5JPGDsoZKx44xBvbBKkPeMmwmSS2QWh+LOzGr

uHrH5gsQvfW6125R7GH9+uMD3Zsn8v3Wvca2wwICnt
QQM/1EMsupWlbBmg67JSLZFOpsHCjb5jvQ+2nw9HEmilOYDhPPqAtDRvAPKW99/2eo+EyRkBJH6+vex6

r8k8KCRcB7wFYl3hV1IeXW3Gt1KNjCglYQfu3IsqJYBUrZqMmvoaBKj3zmF6yr7mI3bBcHl2JMdBO2VY

Kzaunefb7yHirbbhOeIlZu+ZFneUXxB2D5koOibw6+
JeIh5O0grkHL0Im0iEOtf4TtAP8kxnJChpbdnjUjRxz+Sl0JEK5norG1v+XHxlsJaYxlhsiWZrx1y6Jb

ffYdAu9IL3NSTGsRSUbboXt10pfmvzx2Wk4l0fGrJJw9ojsJeilXeU8iwMQaVZXD9Zr4Hj4wizkjyjK/

UYHC8rZmXJpRK08R2hCch6dZ2JBlywDUPuR986YUaQ
KxisbNSahRq6LECdQ0F2bc9fqNo/u88X/03xXicHhHinPGjyFhRhLscIXxhvn4tChiZKcS/CJK8M5vDJ

kRmW+PxICDAmy5shasecM5qH02nRDYf9K91Yk+HIeKxEJ3zBUKxqxpLLl/M8CbHo5iTfuOqO3/AFtgCo

IL6ZJUIUjq1U6QTHefZ/mRcpsR0CpHAmyJj5edt12j
QsRhJfu5EkvYL201K53OPg6J0aIP99z+gSlv0YX98lT4RASwn93lcogAYMq+NzzdT9ZbkAil17YUrbd1

GPWvsiPaRi96zXubD2d7B7XqWXf0ljHwmREzPqPBOuluw1/m1tfC4ZmL//sRVU7EFaVKgUncfITL3Yvg

5nwQQvwhblGKClpYH5LY37SfpMJ524gmH7SNN7/ACc
/av5fGuaXE5yetDgl7I/HYD5jzrVO7Q9GULahfgvAPCZ82MBrcTT6+ToQBU6/4LcCp+l2Ca0uEgjtP+q

w2aL0Ujpr+ZRD5xOtS23yrHAYqDr1oK+qs5csvpfzNqZkvmBfCjIj/1sUgmwKineuNJjnNMJnTp5JTCt

IEYh7Dvf65VirO9rukLEcCDYB4yncJwJK23iI48tfi
dOsKu/7sJtuOAPpnjnIZgxkKbWszG4LugN0rDqtaKUHmUTLgISgr4PMNpdB/eFQRqySL3zmQEsjscNUV

0fdEq3xUfHoSYcQ6IdZmRy1ASYUp9JE/D2sL5CzgHixAjzGxpnGDboXsMWn/DbS6Fo7fU/E86DIiv37e

osI5XDJ6otxFl3sXyvf2xoT9t+b9KJ1ZiAXfeUMIql
U73nN/xgq2W8v8OJU+oVwVCoTmFCwuu1l8AFzk+pf6kS+uXWf4uEA5Xw9fG0cRhGiU3X/QP8fJrWkvod

8EOyaR9zsW7Inb5IozgssvKW1F/bOR7vW1P8Gg4s/yfYV2dPY2C7xgDGwaTL2LAIFKobwfSeGYhsf3+U

+hCugcNxGzojv0mhr1hzVvA6GA+eeiXhfwyOyQco68
hDZ6HImxghJnY33DGZ2WQWxsG7hTgcQ+PMiOaNn1lsz7onwPIoEygQ2oqi3tASIy67yb2R0LwV/t6nV/

dYofTN4JZjMDdcp2mc6XcMM2xA/nCyPkGD7iuDXftPFmxeI+ZpJSdBuZdPU0HiDnsh/0MYxZiJY4FKNE

l0ifQrRVZFkbuQKt+sbFm7Nqns1qk6idIqZTZbD/nL
9ZBsz0BBWx5LAByKTzqp1c5kr+xAJbSlEm3V+Zc7x7mPa0Mx3TTaCz87ZSvi3ovs2VLdYTrDaLeJnh2M

13sNfoLtpT6YORYLNT4Vl48cXDpeeTx4KiRZrgs/O74flf/of6Py2vuZrFj7arcXzj6W4nAsRvEvSGKs

9hXVVZ7YL07Bj2dGuTtn9DHEy92s5VMoO3PhqGC4Gy
HJeM5jn9N+sq+PxH/W7QnWSDQYyT/1bU4WVDjqOiOff4aRT6Iy5gw7u/BPE2sKpC+GQhxX2mTJPnz7ps

B1Uan+ryBE7G1oDWnjUfl1gnjKWm33upD8LfIeNk22eZOFQ8dtk/Kfv/v+IAlJ/3Bjpoa+bT5Isu1t57

QyGscHJ4WbxY8yKopopCm4k+jSq5FtEbLDvi9mY3Ce
aOMJAvDN/m6ORnXoyw/eBH/dzzMRGhNs2eCEvrJmTsy2oq9CpVqMpIdoZj0lSXF6Sgm2IOkao463eyRi

x5uERtp77lJxuc2xmIx+Xuw+MG0SxsSUr8Cf9sQJ/z3XlEroyt2ZXaYJq2KjYCS9t/FXB2DTxn7pPG41

yiKRWbRjACQeeoNFt0ZWbMkMBqRFNb/iNwaD4PrW6V
Daye17B1IDT+jOpdGVIlNGSVjSH8agoCsead9vmb9mIpJEErcZqiCuUnww713Fvl+XhhQRe4hnNd8O/g

u1pnH4/hxEiATblg6VzYIMIApGRKvxXbPkNsAzxHcafRNlouO0HaW7INcVyzH2j9Bruivg/ZjWIigB4F

qcNO5ACeMLg26T0Lgz1gQcCv8fGKpxmiZc07K7IQ8l
ie1tgzyvdhrTCSAnG3KaQDckvsKFMRUyEsiv6VlLtO2+Pl8Mpc/fhgF3ftsKUp1IUJ/l+dbypZbjPR8Z

oq6/6ah+l9QfRKISZ4HKOFqYuG43vb/A/uaU7L5cqTR51deuzA3WUeUFf1895Uvfd40Mu82TSvdmcO1C

T5kXryvgaPq85pfuOKW8tyqxf+C5i/UrgQ+DqhVFke
qNJEAt9D2yczsgunjIRBA5+gbHifcVIp5FCK+0NLQ/sNC0XMYrQ2070S7nXaSfN3XzcmsGvPM+WBE/4W

tIcIJ4oDZxxrAM0tkyNf9nDhe3+ckmmuTFZ2HIFB79xPxRFc+Richard/8lz1tby+ULrGn2fD/cOyH2zgT3m

OhqX0yQW0AVDgEwFiHlPg/IOFOgbfEjZfk6T+65izX
a4OLzhYIgHOgFhi+64TXUhxV0yb//R6y4mUc8gry6y21pcSYPWOMrgPwR44wkjj1AnBwQFr30dOVzZll

nhPFKKyPgWUJXlfVMfhjlpa6/RboDAMrEvvniLHOiXeHwebZSd1zif2xI3GfndZGTIhX5iJ2uDk13NW/

snA2+EmFY9eCC9YjWk0322/tzwxkhNlPARauDDUHIg
3Ohgxm0sl6oo3l1d3H7p1sVgo8rv9q0+uVOb5VZeSLGTZVtEZfyffj10Ka+DZxhHWlZj1jVrQfLMSHL8

BkwrOyYzwUs6xgsKjAgiuFyG32mfTV+sIZtPXnXVK3W3Z8/b4ydQIMmlGCzjswdQAi4FaugyN97dPRXB

97x2FsnXZaRgFyfqhcpbFsRPQz4/pdIFBR/2BsbeAg
Q4kx7dfHXvzEjUv7PP0cByhJnBxw5gQ6jsbmT2pZ2SJ8d8Q9LPMUiofqy77yII2AnL2JoVJHes1UyEFH

Cvi+XGIRt9mdzHLhcnjK0uz3Cc4T0E1GeP0GWncJ9FhApYa4qtrpft6kR/s6OUJciID5oyO9n5SQKnKa

7FSj8YdKU0rI5N+kdvd49VqsfLoxQBJ7dAhlaobEel
1trb1j0QxsOmFXj4M4ZuF0AI0YcLiZFSBx0kvidBayXhDcN0IEzcUj31yGQ7x5ZLci2Q1bKqBcCWM/8R

F49JgLuVItm14cHT7wIhsGmJOCN4saTs+eOF4heY3YpYC4bAxMM2aYhZr6VoKuBgngZDEm6Ja3qxEtYk

5xRq1fLTxypEYluzKpIovHmotb2ftx8dvkz1qZ79Ib
nIejJbNdDNADkGWaEYM6RLI81hem6jsvQb/k1ZXFphP41i08Kiv20s5MihaQTGB77lnXs9WdQ/UJF/pm

q6AVJrl3y+coCeLpK28bAB/exSrGeQTqh2FBQTonD3OpZZ7U9NSjbgDJh2jqzfe8bj/HAfyjEuO/iEku

CVpmv96iXzLWukKMfW1ovbzEndQybaciu5Bl9g1Coj
EuNWI+dH3iBNEX1aU9J+mEGrsCA9SNqVVNTPgGo/Cx91Pj81c/+vTKNs3dhSaGXv+2tUwBr67ztBFS83

j5cPux1O3rkupwM2nuwp2wFIxgecD9e4T5IViuNxoVspbsXXuTASkAWnXiqPNnX4p+spTLrpdBwaJCRS

+S+xTfY4hbM/3QBYcwUU747Ban4IVWA7J3w5ahSA2s
GAMuJ0iYDzj0n+m4hJewWcDlJiJz9XK+7KkCe/MLqwNg84o00ABdqq7wDWNmQz97rV/RouY+wrwUl/bg

ZPoe8fwi1hKK5Wymlpqpc02ZJrm3OjGmhfMwpDWPQPhFRVwcIY2B48NlcjTo4Rlz9KajIs1Uc58mjwRc

QOw+Q/Sr4pW2rz4pKuJiKRJnKDl8oE/NQ90cknZi9k
j4r4q8pzkCFW6JALOC6JZBqp0gfmm04qilzZFsXKztOljmc+Gaxrij53oiy/m2JtJ+sK2jl83VqNHNY1

Rme0AcUKIRYvdzJ579/xJLgB69SrVz4TsGO8oI9naGpqtU5/QEQCUvWUazv9JGHi8YoyeT0l3hEykago

GhtXwI6AEVAklLAsqVRouIuYSTleC3NAi5K7A5CcTE
pxt3TxHtMoL1IpvMD8RvrB+W9lnP3pQb7kGNZ3BsTOEQ1j6O+8h6FprjRospf+0cF0y0ifwQtINMRBuk

E/IHrS6hNfKwBchoSBWpjfkMd4ajh8AXoHCciO0ixkxh8mnxAdTrD+kaJQT6SL0Se1qTtvumuCDHGi7L

gWQPoPPfeZC+M+PJcVkU/9VfVNUvPaxbmsYKZGXL97
URhQyxrjgxwTqNXGlVm81/Esb+vpAlBperwDkEplAWjhQYjGpD66eMWX3smasMb5l5bOoxNcDhwYtooz

P1ktw69d4cgPE+Udd+wzG1J/gZNP+PFIcjjO0mzhAZQApW/jE//OgtxMWQIEir1fxCvPQLSKH3y0Kj1c

XqZhv6ZddfxyEpxh7ZYbgpFtW8d856cujW0d4AXqkm
zSvY2ZvGZ3dRmz0aHqc9/nX8KnD61DLskKSC33m3FsB6EtbzYkv6MmJ+6tZiNV6nIe8+lj6M+nhZCuzR

D7LS7EKrTrhetxxB45hlyn29pxt+zvNgruNmtsYjeyBTlKweSgZcAMAQATaCwUdnSr9fe2tLPZ2ohuEk

UPsEIpi+gOpry+DuKZujgVXS/OCljTmkgFCSAvwBoH
HRtFVtDLVgzSeRGQ6tb/Ru5pvGQvYfNzb9poB4LiOYOCYfBRK61zGaSKQzNcDDfQJen9dlIO8ryGBVb8

e7ihev9433jMx1766wP/jwq+e366c0HBYuVSvQWxPL5r3hEsDxoScrfJgInzKiKv24+fzE2tzhRM7WhP

fHoZPUrdOZOvqasok9pNqm19m/nCjSyh2tOd8DTnMO
iLAMbG189lqT9bs9ZpF/UScbK4YJ6j7Lv+/0cf52x0Azw430BMj8QIgSnVA7m8Ni1LDTZctFXdQwUwnT

UtC93FKDbpPq9kAJ2T4IoHXDdG1ckQjGnnl5pylblgoH8emmMpfJvr6Ri6W9YMDskILToAD62VoAiiwQ

8uOLwldZdy+G9ovmtUIIK5HNlwK97c8fVk38pEM0ow
uirDV+ft79UqDvNF/fTDainLly/7tg7HNvz0+XgqKb7TZ101gpDa2G+YfcCcCxb+KP4HTnvu9clfzdaO

ZsfPas17qq9KXuNZd0uLUmrlH28o9cp77EuqFAH/Pgj6D5vThoZTtNhc/myIfaJ4iP/YExqCdfknuIcB

qqcRUspWUr4E5DhUL6g/rveTfEsDicG3JdIHb/i0ey
9tmOX8eVhyZ6+Ts3OienhZV3F18emRRs+OC61nxttJ2OYLSo7q4p/YtOU52rk2xQXK/Grisel/FrwULBqH0

UzQVtvxfJXsfUHE7HLAeVaqdmR7bjJeu7gzrBBrdN1pkR5g0ITOG0R3IC5pHcI+ncsqB/Mdad354GAJs

njx+q0vEq+f3B1jnA3DB3fYdF0w/AtAV9UZP/mNEpe
jJX+8fAUtTkp6uVb+eyYdyqzvjmTrXg/MuRusKoPiw4Q4Llhr0kAbgbTG80kyIlx5eoUckO8V9tW04cM

sXMgTivT9rwETvk/Cy71Dd9we/vWerozxZbtRap0x2XGN6PIOvqF9mjG8vXWJ6pYM9end9gf26cO2YxT

SC3kU7pFLknh5Oh7z6yGH4N76mi2n3QDjxrFhwGdDa
gt/D8pNETGL6LBl5vJA+ak417M0EFYudJq9tmfjMh8pHQa09wfnaZP7NuSYR/9BlocaLn7m0QddHtwtu

Uq+mtm0ZR456HoKdjF498QFdvo9GgTjLFJA5iXS8wYDjnOY6HtWJQhfjufyca9G3ckD7jfY6lunSrRvG

XtUIN8e5JOTEE0IolTh04IlxoHG4vuBm2MlSNX1qZ+
HGCXyr2aemcGhdRLHQo0hy0xl2hY+Hgqlhs4B7H2iikCUi6xK9/bug5Ylt41IeWcnwN8cSsbuyMTPhLd

kGIDsUdcCjUU0Kn6By0/dMJZXabatEebvJBkwMXOssW74goWU90HOmNU2w3ZDjIjq80F2CfSJ6sNSF7e

JMODbJkx400xgS8kIAjG1IsKSLq4gfkBpQ8a7Hpb99
GkkXbpMFn7yEx1jpfj6vLX0v0BJIAMxq3yBvN2LynaSgRlYkC3R49YFRHiOXuFFdJ6FpZyzF+YO6I4iK

qQgaRJ+cqofVmcSpyDWQoeHqqQx9c20RZ/tbaY6PbVSYsijgrUjJ7038P4in/fXzJSeDG7I/6+3q+A5D

fRJqHFn5MtJa4Cucj7zFOTYm+RBs5iVkXxMQ0RE/RA
PyHVZNo5X+TNIE6xjKovhWoFyCLECrDSaCtIA9X9pCnoBUFV3jBJGundpGK86JhZYIQkXNn1rvL+8Du4

b5OW4jibi6b393NWQ8y6YteWypU+GlCfzrUbqeTaoehtWyHPZLXMKa2DEDnbzDlbPr30+CQ2XNHks693

nqdrUL6iaZOgEVakvNJKaJm5ff2w//Ku3vo8FIq4YG
U1/eCM7z8N7Omr1fY8GJ13ZpDLO3rZ/VcdWd25CSl3WtQ79GCd1esCmmd3/vcUcSEAS+O7qHUFjc/m77

GwpX9kdEjuuWpF5Geg47XK0v5JNCqlDI/SFwbzlTth4IciGjz9P2xZaO9AkGSaY0rzUv9v89QEC/XFI3

nHhG5O+gNlrLQ8g2ljXXIvf8pEzq2+0MzqALyU5Csn
PSgIGchaqTd4hkmySMqP9/9LFeH/T3cXFAYs/exzeHDvZ0TPt3Hd+eO5Jj2bkxipZC/Giy1a+cqvFSjS

V219G85L/GPzpm1Y/FrYD3940fsvYQpsTMOAirs/svrXtllY8ccqHwA++Ttt3WOEMGzJQQF6wXPbopXE

4Iq63XDdlo28yI91vKC91SL0b4qbN2juW1e2Xe/VJ2
0YLlo/Ux+DZLb9+bnI9b3eicmVReVh5KoVDA3E4AD1Y98VVNrecq8dyBfpPV8yge2NzS5EjDe9+6K/i6

6Tl2sxrjasFOC78m8ks4ye7swOxniCOCxelWTrDLD3RvnXMq8Gr4uqFH0S7/g5UEEF7dJ4a90kFneWq+

hvYQ/ugWimqGvxgmjamqGQWh8kapVQaCLMdhAi37Ei
Fa+oRpTyQDfx622zB08tWfWAaAM8emPN7uwZ1wUHia1I+kU6DZHuIFm/IeLdstgJwrtn5pF9IdZAhS3s

YtWsZGSa9NQSi+xulkR182vSORrpj+4kfJrGoF1y0xUKDeZaw8sG/hQkOOI19e/W6PZo5sCrSMvcwqCr

yi1YOoMx3CytYEk06s1TFwCO5tKLVsZFN6jxpd5C5d
DFshRi5R7auZ9Fqfq6wqyoVLiyucXDwruUmKMGLfv2Iqr9I5JfIjqyoyRobpF6m8exir8KxqeUcQGCHA

F5gHDhF5NmdyEA4qID0Z4Maa3atbmSeNQ9DfRl75eIsEDGPR0LZAZ1xUHWRwkEHwCD0joiQ9Ayv8lv3L

H/oR1s5CR/VxWwXGtNBBambempDfo2KOXbLY02Ng8/
2CBz9qBriTVUWjPyAD8Gh3uz1S4zWTGITmY1zBnMzYGFfGAVPW2Ejb1aK2+tARc5rmdFgTghemA24Pdq

eyF+1CHXh9oIwe1Vek4IgVKeh33Yac2oReL8sCh0Q9NxbFx2aM68P9zmK2cetliaQ/gy6voSkbgWJ6cD

IFYg9MegXnyPPjF0V+aDmapUI9F63eaCJZG+xiFDyv
KInwWWUxWzkjNmYYkBL1YQ2DZ69MzIJoeN1A8mnABvd3BCOjuAIN8baQa/kETzRMLI0Yg+QLfEcNv/QD

o3Cb7JO5Ms99eVK+AQndDCvlcy5CbOJJyY6rjmZyxaQlwJSrunmgSJUL9pA+udGSWgsXu6SeSX8RpYoj

HReKGAZc0Gf/nJyOEDBybbfJBVYLmJyco/rUKlvGJ6
/B5UYRr4C0wVE9cBL4ptMMNFm440uVvZ+LNjI6wXyFBrxev9USf+w2BDkqJ5zKBRg+GNOvLaY27CHq82

yRvpH7wwR2wfe2WZ+wfjZYMaLCB6L/GTRegkqMbyY9W95MznjAQmXgWtN2i7uUktdK/JfHOhe0pDkHoY

Fnzd7kHFavRb5qTUgAnGbGEabnuZZROUz7vSWraB0M
4xHxlObHT8j4ZK98iMwGc3DXphsGHAGhENkRVlr+xiX+64T9dNOZvCLD8nvovVmqnsSv/c87fFTD39jI

0suYApX3mAY+JaSlDhe4s+yP059vT9Ws0cdkvPwy6M8X35YvCbHhSMLcyR8tGfyEoURPfMRQqBcWxlB0

vEMlD80skOuEWtH1+3abWbfYgFTB8xgYvhv/1LHs/u
gm2Iw5D9oPxqUc3SvXq8BuPQ85KUtZUv2qwda+oUQc3Y344sxhqCH5MoMamZ9UnjBXqzRHjLdMFdTD16

HoczQnXhpQJr8qLCu14tfMn/4JYFqqm9M6KlcBI2uWqMU/RnmuYH3QYJdh/33gxX/TyacC42HAv2roCt

mdxi8KkYewvJ+rg+ApuUbhnbejJhv40WhRZPgUCmBw
T6udYMyWXoRmEdbQ1iVxvMuyAECMcKhqyrye/ecZbYewmO33otD2134W+opjzwptl4nsVHQmVR0cP+DT

gjJw2vM+r8A9/Uqd67xBoqySYRgaNawu1+Io2zDRntUU8dV4VkFSLIvcgi3Y6iWDA6t+69rVaADjY1fI

/4d1H0Y5uOHuim9xSkFU3vu9WyENlC7ugKE/cPkE+8
aor2p7jPjWqh/Z/OPmdhkBd78jVWm1eg0DmCxciMNaOfEQ2cl3fQjnz+ExRqGz9Fmm0eJ6BARAeUFsYM

v8h1SDvsPjEOkHyurBhc6Jx3ZwMwsIRmzwqhy/jBZw0hv9LOMg027FA/3rshz/6SzFF3F/ovaWRoBgoH

X90ATDa47911xKWMAwII+ZhWTOSpyXNXLqa0ji/umB
OjFJjkf/xURwXS+buj+f1afcsAiSz3kgQk7M+794c6L9gbmS6D4JuH1nsnbGko2q+2RGYSOdVZnizLeB

0aS6/2st/Dfz78k/sEHuKK0d7Nzmv+XwiMMISVXoEH9T8esgihQq/4HFzs/iVeBsA2ihslWqSSqLaAW9

4TxDt/Bpj6rdLuHvDBAx9KwaSuMesc9RjHxO/pBXxX
XwKa4/VfrnS9dPgd+qCUWFraRBOmAcKiuAUIWUdwpoYPt15cUrafzmTgqg1tpWdMurqU8NLsSTxV8och

fcubVIyaorjOMZbIy7ueo/5xe0qbt6ga2+MtMsOm1S+nV0lVw4XiVnFH/L8kvGxNIorlqU3nerSD760S

Isra2TJfapUpuL+dUq9o2sSeluW6vPGKdIsI1jEww3
hR573TYrgGyzaV6XIv+V15gjLOLNZFW+ewyiuh3h1ppUz28n2p0XiAzWA0eoFZzEVpFWz9ROmwIGvWTd

8ZG19e/zNxiV+9gHsT1RhoRyAHcVDfvRak921ZfqZvpFAdyy/16nx5AAd2P6ny6KMxiGhQoustFSkbl1

ld2bWyTXAIpvilHFIlbzEb4ms4erDIRpFW43YQE3ex
+77ejI0LyFA22ILYv83j2fI/Gf/1h5RIFvuukS7NP+Tf/xLsAw92R5tRzNrIuuQ8iyCtGang/LslxVnB

pPHSIrKlgjDLiPoigcw62Eu1E1FdBhylgUJU98NOJhViZuwkB6kGkS0Mnu5oZenR36tlYZues9ZAYoMj

ONN8ZsU+SWU+9vAcZZUO26ts0hFGXj5guberQdZdps
St2tJNf3AhV7wZ6/xBi+9EbNCVB8Jv8asMr08QBKWeG4B+DFIcscVtkKAMM1pLDyJeMXAdXgSocdxguz

C4Ry0PFHe33wFdFPTm1m6zYTSIllc9vPIWU/Xy6y0cl9OzEhmA2FNa1oVrEy90864FNPn2UxU+e+lS5k

tvTLbr/urvd9z0+hl+cq1uMxE0M8CiS5jx2gnG5Zar
QY+7RQ5azf5YIvUb/GgywM/knYsA5mv4O+7TS/D2t9+Jaf7/x8b6/27HmjxBzYHk9Ma5RM5//QfKNGZm

FzeSzVd40R18MDr29L3tGo1FXs7GyKM7mvmp0XWBWJSgEJWYbXeNm/0uaodGkYSIY6a9olQNRtfc9vZ4

cFb5DC1M/6zMIT8sUMSvwgGpqkLKJCL/VeUeeuaG4k
U0BtdTjumx2BWYSIZcLefJHgwR5s7tE53rNROrvi/SMMzE7syHkMSOSfFl69Oa6Q5ZQvTQaEPLgbKhUF

O0j0Ns+l/n3ubbwILcIxL/ORYoQ4HSDT38Rbvwu7ihzW/ZUowwJ95ZmmC2MLQ7u0xEIpdIfXEdxNo2yQ

KZ/KJnj5tfF0q8yM2ACnHxbC0O36eOt6muGqzR4jnk
H9od0URguAI5t+gZ1KALlYj07XsjRHkkMYCyHd7PsjAS47vle2pwThO5PrAHykEnjFazpMKmweRCAuBL

vVIiYRVuUL28amQd8nTsQFCgPT8CbnDmUwlpGThNd/i++Wf2vqmusNT+j+RHBiyizg3AvaR/SC/q9K4B

V1xP5UWMfs9+uEOzNw7o0B1MaDbLcpk7SSPrczGO/M
y1kzuBk1U+puvQJirGOcxcVVomBUPCgknB3AROZuTNR65HuRab+h3bM15+UtuW6e6vza4b96cwIngQLt

Ctm8x6NZ+F+NXo85XomoOy9qj0y8p1kYkgkNlf6KzbRVj9Gq9PLaGXYJxbFxCvQbf4U87jKLQ3lDMwa7

arolxKYZuub7RElN9YTYJqPdT7GsP8BgBAOdV+Dvs7
tXXwVlvR8IZgbnfSwMq2y2QOSsLPOCbW+UPzGqAr4rEjtyvceO1o1pFwAM+ijEQX6TiA+FPQfDlqkrVC

q5U0XSMSFU8qdclveQ/DhJS4K4om6SDrErShgMufE/Wz89bVSAX6MOu/U8nuRCi41/DGJ6VUhbUHbxUY

DQzh8HEIzdnx7WXsJVOZJxKxKOtYUGZQiHP3U3bUsP
FBSALdb90t0dy5whSrg9fk64I0Aun/Eg03nz0jr8gu4r577r6xtnPLI8o7+QLQxpgo7X2kZj12Xop5EF

E3yd4qmmVmmKAj/x1wK1BcJACHuq1etC7UJK72I9qxlf96DjnqiuknVfp8bWWBCWSt+d0hElCDsWprhO

GZMQYAx2hwvvyb3oPO4gw8lAYZXUhTr5l5lDpLADLa
YXecS8c4M75P13gidkehyC5NGh1yoF/4ubXHbQkjmvce7/qgoFhAfNJCGCB8AjJpyaFUsh/G5uNTC/tw

XXzBS8UO2Z9BYg0k+7IdSR0BKZ00tDZo+Mk8Pzh4D6QnbMVPTiT6060Mj1ErRt45hbTyWJ8tquXJLWQN

9puh0kh+ukQH/slOsgaCtsIsGm5EUtpt3XaAcZDpiG
ChYftb6Cexo7wpUJJaVt7S5YlArvfvVzsN75dYWDOhG6wAOpQ8c1BKW1csGbKzj2ylm4g9WKVzYLNbu5

cI09Yekks7tTxFGVwQl9Y0/2DelhPSHslmdjiL50pcddsutEcPwGHt+N15uCiRIWszwnsGzqqvvR2yjb

KMuCDiFP2u1BRhDRkezeSMoY+OnftcN5cvudnjERTO
LLOU1ts+w/oSAorxpXdQQkdp1r1+jAvVYtgBA0Y7/an/Ed4hPNp+ZAHB6fcW8tBVXiOvMGCUvb3qmSc3

lR/AyHzhcbrrKHfgrCkAd5XAmRwZQxqmWutLux/4rxnZIkL5ttz3wMe/gIXG9PbEkYCk82AeBrwXoNMo

CYQUPdJ/NbIPsuBg/d6JJsMnmZ0f+lfBLR09pX4U+A
1HngOEwzJjDCXWnW9i3GdL3oPqjWTJciINUjKG+xRs2K9NR9wegFAKYqIciXnSknwA7zGjx8Kkcze0Z7

PiaJCRrbvNOBqwhVHYiBGktH/ql5GpkoiQsjbXgI+QviTxjoJEilhpCauAtyZt7OC50S5rnkOZMuOJgx

Y3yG0IoEnQ+O3aKGirlAc5/W6xIOUQ8xbdvk0pobmL
LGZ0eoHYABaLrNi+swu/jZFblIprn5UiW0GAjYJZLCMp1ahJsH1KlCoZzi8rV/V9j+sht27KlE/3We8L

bi2l60Tru0i1XJzQIW9rgWrCU+fynSHyzplTl57exrHJZob8/WMiDO+SmFFaAPfvHjHo7ralaVeD/1ks

KeTNwttdXSv0UyeAJnogF4CkzmHEfgfcuAjhkBeW+r
BGoRjTzcRJeAKViSQNnP63NoveZmvKYJWv8EFZx+gtMX6MWtVrBU7l6Dt5C1sTFFcxr4hwh4/YqUPeDR

Rq9gvSslQiKtu1Cpf8Sp5fhHg9cv4YGc8lbDziASJdP8w1TCm6K1vceoRpIHTfMCy3r9TUnB6oqhUbwH

iR+XrrlwfSKugMHKNvf5LIf/4OH3eVvCB1hX24xQB0
hdRkdX963PRldY3jzX2PwVRJAQkwFLi/kvFO3X3z7E/4zaP/DsJtUpVbgGw7UHUI2uXXTiWQw5BvQT0f

ijh9Pq4FutM9+9zIwNYBVASOIiEVJlBHzvW9R3bjbuOLNedIvyDOgIrCoelXUCALFSuHaRAaeLXlXfu3

dhD0vxwIzb42dfcRCY7L1uFMtR0QvVoFppeuAPJYDR
oro3T/fgMKx5yPlu0+G74ZT3N9YiPYAhmH9hjDNRxeg9zh/YRRWHp2kyt+VkcfJps+yGuA2gzXbSYJiP

7Egf+6dcqFI9BG9Oj4czORWs+vBXoZzk+dcpounGWHjAPDw9zccghl7mhDYCv+VpN3n8HKC3U5pk9BiO

ucQnrW7EjQjS3NEPjef8OkujqP62tO8TFG2lISL9EX
Hd2YApiFQ2dSJ1245hfs7xAsiq5sreBIAQRUQgyKXmbRrZr53VTx+Csxyh3vJK8oopb6vqxZXoagEeJq

EIwM89O/Xy5bv23lNmKyWBLuZO415wRNMK9lGLaHXDEN45PbFjeXz2978/nTZy0PQl9CNwcH7a4kbVEp

TvFl7keCgBGGzap+aq+JMCb7LuDgpY7/LmKgTGCbAQ
vys/4BlvgPM4PE5hT2cx8ZwuwZSGCfUblpqLqySyZBddsXflKM5mScCkcwLGJIbDi4RozVe4F8OR9JgZ

kywPpJw8Wb7mIMd1WNLNBR2ZR0QozDT9v2+SbjQ7cMFGgj+oK7rNbYo1Oy/fLOzMCrDVCcOdMshqfCTt

auMuZm6BCX/HhM2VmTcdtXJZxBaczOEazX9Sey7UxK
hpQtlTy7o/oBLVe03ub51xgs7xA+0Hhn9V6V52v5mcDD351I0voocSsh+ul5n+tVrI/AaNmmUKZWK7Xc

PJLrDbiGdmKhNOsk7Cy2pG6XCHiu1i59kbwb3CMKmaj8n6QWMOlI4A4fXYhxPMc/2VZObA6HkUdY/eRl

7g9nX0Q3PBqLxsr6uUPaj0cVFXa9Q4VbYALf5fu4bs
TVzhOaNa3Pb8hWqgnxIzUEK8nyaayWUsknz40fUZjJdeARfYTGV3cw88+FibKuaz3jO2dk1rRkN/rw67

J9cf3dr/bxxOIYnrIwGRMZdF3aJvOaPXFSAMSfILEYvW3BkTm9V+zFGm3NCg8qG5/P5U5e2NFcl2X++D

4ztIauKaUclopxZZpLNXabnxOZ6In8zsPjg6hW343N
sXN4OsSN9XMyuWI86goZsWF++mBG6IDPV8zB6gFnvNSQ7hj62iRsO6AceXeAwCXIC4UlFjvaFTX0VBV4

p1HJGFeBAU62wy4hqC675by7DVSqF0cSdDvUORcPLikPeCCvikpvl3Om+6wFZbttavFZS4IVqK6nkztG

Kl6ajkz6N2dBcBhNYO/+X6ZJ/Vv+CylXoAQrKKKMYE
emDjsoo88cZoRO1itzJSaCRUEC0iqpKvtXBGUg1v6+Ygv2LB+C9CxfVRDyKhlZeXEVP5iQVPRbyNcQt6

ijMPt/+EIur9ABy9IhPnlOzroeYYuF4cVjt+UOLwHkLBH48oer1FXb4+5gCN07dZVtiVqPc0LOshvBBX

+/igVA2kFeHdgiLaa0DdoRPA992I1kOPjfyI/NBJWs
kRM7GI/XkHVrRefz2H6//od3oBSS41RzXt5rl//jYuRrdx1W1oSqSXOtryhtxIzM1qp1s/U8ubMaotOm

BjBsDj4sYQVdFvhk05fSl8zlCe9oJjE4FJWEurwzzlNOaM58Vt+q0IeI/HW2fVL014QlFaJ3pnviB11D

K7/e8IY2YtA89IE5W3k6vP8QXWePn02WZZ/cAqqzAr
rxez0dt9osRZkamGQFMzFSF0PjhN+8S+gizqoSc26oczO2kD6pR1cfUTqRX/wsY3oR8Qceqqwoy1p5h3

EKMBQgVOYqEuZ44Uxj5zZWJdbyk9VJ4RcV2bpB2O07Ioyyd8/zAXtHlm4stS7mG7JuC9RkWqSvgVO1sD

Gica4AqSmrMDrjt7F2tdVIfpIAqaB/37b4W/6W/0+i
T+mqiT5ONjXZsLzyh2Jm4yw7spqQZxK9V/YgHcUmfmT6otJUl6ydbaN32y2BZ2UIT7+Esther+vd1sedcGK

1FwK6VNuKzz5A4SNzcD94L7+gbhtiBMRKU2rZNsIqteKVcir/b0w2yi5+DvYbAUWbTPIzlIObEuaHfg9

HNVDKFL/4+tgCqCDYYfpHIXWYtk6Re7T/96q5HKFgr
7as0wYxJlVUC6HA1e9/0bh80x2nmQsvpCXuIhar0CvzEoYwGWZhLjR/Pjr3Yf/IqfLfwfh/QO3QOUafO

TJdLApxJOuOF+K8203QqGg6lP2KyUmlqa8bwpoeNCtHTrZ8cki+U28ZimSBrYBwEk+Y+jrGZFyNcsm1C

uwZhFCEyRu4a+mZPHGIAhHY1dQGbWeTcPmQaKbhx9X
c/RiqlbWBcMIhaMjCmKI4M0RWsCCgD0x7pkB23gtWV2LTm4bV/9g/TYv0yp0KINWJbaVpRwD7kwvEhmd

5cIP8m4hmDlAZ5qtCthl7xQxISDTgCWx2kDgEQqy0JED9bKRY2uDS7Ed913dPHog1NP89KQUp8MFGDOU

z/ZM20uMnzekV2wzFyop+HzAo7Bk37ZkHM6TOZXE98
sbN2v8YsdSLPIVGwnqUZ7r+/J5qptmZz+UFVfr2ydUYYrwDDIJHLEZEcxl34wni7Gt+hjTbsU28/v8y/

tX7fAvY4q9lrg8sLkGNgU+F3QikFecmpCQBlibE8KX0v9BRhegnKQVOysIB5hPYEfMdwdjATrldzqnA+

o2DxdEpSjbAt9xv6XE43Int7l1Dffru+IszZFOifWc
q50UUg8Cp+/AynswNpjMHoRYbQ1mAUyjaXRfzDqSofXpGnuE84oneE16yKdSOrLm+rrxlvJO9cdrGQjs

+C1aaJmnAfT+y9m/gdbaHR04PX5aIC2N+ffqYB20tlC7E5Oz73ltBIugPrKImo8CKRE+oRMrt+FAXAcH

K6spbGK4xJZqcLs0pau7QB35Deu16CSAb9K5YWKAyU
C/q3JPJltI91xE4bBxlNRVM0uXy4P2xNNQ8kk8fnbbV7bulEPkoEdmFCPfmbMBXe0A2xj0wT1Vgk6CvD

Xs6fcrIlX05h7tdt8lK4n3E7qkkdY9Wo+xEb2owoeSnw3Qx3CclQyFdoZvhHZ4vYXikVdgxNl4lIJaPB

1e0lk/NutX0yRcgR8NGxZzeBVRyVzBoT4vTCYMUH2b
z36ADRWVCNVb2NOuCjtOl67ewSfQaSpC6w69Btt4051I7QvIgtxYSsrz+Qfgbkh/iOrFxbNvKTUl2cFB

l+OZXPjpy4iobsY/6GxRDpxFh0LEy4NiIt4Jverma/EkigvlNpN/F9eH6BRWRbNaWDxxztyFkZ5zGQtl

qQu1fUEwL72tzqHb4OYa64CvMNBX5LJtvZfLT8ecth
YEQJnpqPFm4P3hUd2yZQ72IUv9eCGAYHNA/LrHY+WnAPCDWfpTWMOtslnN2zwnQXjb87s1QUgiwlWBr5

XLZVpUkv8rMtyAxqrBElr30yiY0NAtlQZbPBz8ft7viE6hqHtWtLw6I31ANhX9Gp/Dm8CHeE1p9fQCrK

Q3oCnPh39qZ70JRT7e9ZnqdNBinXc6NeuoR8fXZxLh
PzmBg9+kQH9SHHbDuHg7TeiD46sN/eCwfbKFYg3iyQfViJCwwi1ISiVsQmWfR5Vfo8tw0rxccZh7M19f

7A24JWn9ZrPimV2OJ+k/R2R+ez8+nY4sAZOSlvrVu3Mp5+LA3MwHhI6FoaKpuOhxIlcLPUOKUEHJS2aK

0ytNk2KJQeBGWOMgMTfVN13UiNiqS1u7DDn9lqp+iA
J1flN2EAVrczs8tsKsF8QCIhA9HaYcwyjj6vHYcdpq3a3qbUt4GP8lhplEz6JBffMvGZH7siYee6lJOU

3ie/a34XuFs8Dts/g8frIL+Tq3QYh3/RMWoZDb3deXb9XPgH7AHUbfCfCIh9xji6+PDp1zzwuexfwkp4

P6a9ZzjbXze4n5tfJpsgqbVExSNDFpwJWFWOD/nXCX
vxlem9iTUwe1SkcpEs/6YwekMZBmd3MHU4GWWQQq23QB3F8o5Fo62zuriMXj3Q++Yesl9GEWvHTUl2oo

eguAp/pgtF53mqgYMpSqP3459y2PeAe57CJz1vzr5k5AiTLLvqiD94KW6AwcNVySFk46c7uDbvOS/xZQ

u9wQ4+ZQ2VCM/yG5j4+rnzb4O1v2ijSokWQ/nsVXkX
mnAC/4/wfbHwin7Od4Iokn2TVvTEAwRE1Wmd2fTj7SV8/C3BeNYt1Brz2HF9MAjzJ9DHcCywo0+AVCpv

6ssDDmUQYwgu7sXAWcb4t76bmpVrlO/2gdD19U4LTAeIkKOz6OUlLrwcUM0LlRIIw8dUeox+BG4PHwGO

mCcAOkL9s8n4LsqbB7D5+lWQHTTP5c23K7o3zsB9kA
q1kt2YU8SmomFxGTFvglcR+j3Av63RuHZiFT97URqOlhTP1yFvHMIU1lqh+iveffwfRqhCZnJ7Yn19yB

pZFIu3DKaFij0QCt13hfYiLooi1GrVPRrVQrrSpZyHv/mBbx4Jpegi6ou4sEpZF0+0sORKluEte7rGOC

F7yDGqT+HZ3fDgwY+m5ca6GTl2z9/Gmz3ERkIMBFWr
Wpb2+AWkSp3Bb/9ifs3rxUOpJe08NHUHB2G45dJ8nEnng5ZiJFuHGREffayYXXM2rfA+45f2H/yLGYey

hN6T7RuRqVnrwIRN5uykiPIVXOIeeeTWpd4AQGE85UeJT3LrwCq1fkSH698XjEs7eV6goul16yGbxplg

47FWOFMJlyj0pt7W9aUfKSJx7ODPuHwz9Xk7xnfFBd
doTn3cwOtBRruG2IZBoVquC87D9SMsSWlo+w8Ex0Irs7b5DeR85Low2fIsdUXb6NQgdXcnMpywWH+U96

4wq+P1DPQWA5MIHTkW4Vuer4WRIQlW3kLr2y523J1CAyFYg9t/+i9RGujo4UhidLIdHdqHa03cSML/HV

ZCtGgrotvLqLBWT72IMLSPFKLyYaghJp0H/GU7r2e9
sucKp39VLpo143aOzyp+E2vjKI4+4tjkIqzoyhREp3IgYVNnUohLxoXUOmyB0hDNM1Ikb1vvgDA+b5Wd

HeDH9tNOGHid2uHX6Hpup9NKcsDw8rLzAUXavrcAhj5N+vFkrUEEGuN4iQ5rOCJruaezDoSHMQp2e4Jp

B5Nkur/HbxN5joZhrhs0eN0e2oC46BmHCUDfIIimqC
1zEP9kG+/W4HUeM/4qMqUMOimD5T60LRvWbRc/n0lV34iGjHQxQZNHe0PDjEmX7+efXzsMgvdMoGBJH2

/fezazM0m2B6GcFz2qixP4ijQyPrGUN6y4LU8fp1RlqdmTu6o/eCecUrzW3d1n4HtjfyEwwHq8IXGMPu

ckpbxQS2wViSWHe1SLPpCOgviav7NieqeVdYSxTLQs
ab6/hxAgjMqqXEbUF6QepX7jZrFAagPtwKrYKp7tLTHY2AOQHLWG6oFW+Wrh3UQdExo2CjTh1qWo17O1

WR0pZ6DJ+QZ+Miu1f5Xkhj+rqcKSdZIvnne9g23b90JiIcKcFXkkq+pXQhmPZkWlOr9HfMBiqjxYRw4O

tvtHOMqW3hJ4MxfFf4xHMaeiWDN0Z23XdNJ2Jw8mil
hX486TZ8zcLXO+O68sMiKA/RP9hekoyFf4/HSi7PoBBmiTxMSoH96qeGo2Zf+08uKCe3rdBw9iSJYvrk

0/Vav/HjfcgFmEc93ogujmmByZfP5fAvrptPGPdzAdprfNw7GbVdisA3yJOj/o3PvuadsK6rb8lkkfKF

LMgLUEHh1kxM+i9x7/Bs4j22vCDt1phiXpiDVm7Y7l
VT+/MGpuIPYtlPzMR/AWR7Hk1/l6S9M0m54vaQkLpqvj0PVHTRwZ4N+vMPxlBoYdYOujZ8NqHEdfQ4A6

o+tcO3FSqtV/E50kpermhMrYSbW8YizAqqgknXp0GuJ1YeKEX8SEoBuJ+5tFhatm+5gwhPBt/BFFXUzW

/H1wdXIpK8tCf4vPy/m/yJ+swbkNABlIbAakQij+VJ
pX+3EdTPRwGjWTYhmItDpw4MT1OQ9z5WGIzXVuDjZwnL76uLc+qb/NuKUz+TFJntgeIn0FXN0SsPi4cb

XGq+7qp6hpKa5im1YtK+RI93tcYH1IHORte4B0sB0iDVFDit/9bVd8Zs1v4In9TQEm/lNSAaZl3hCQdJ

MHRaTbYXpEvCvqGOI6c6Acstx7qowaLJjErV+D3l0m
PTnc1rfIIXlRIP+QdAaURF4p4vT44iF/jANbGjCDDbvajbIza9enVPhbtnTmWr9JBhsokYvXI5oE3taF

kNT9bg69aL/bmulgl7+LKc4fiLTHdLnz14pYyBYNP62cQpBZcoRUXzw+NPmHPrqCj64pZqG9spu79C64

hizQp82FTv7sQ+6lIyJT0cy9yKafN8arsuSvqv1+xl
wrsPevbntlqy8CvRp/4VN8iuQ/6MWiNk6+Elliott/PWu6RWRcPZf5IBCjuoSIHL27fFEnBQ5ULvqGTnefzB

kcgeHp6wTOtaH65CKTETPtk4wMpQ3d2D0fUIwGf6GIxbY8oawmCRkBtfX4WetWK8PZAejI57+5dAbHvq

yT1w7YKLU6MntZzbuqlx4AoIuC/w3BwomDt/NOrVqc
ZYPNCCyZfUS414k4I0FiphU7rPFD3A2MRhyrsJ6zAE7zcQkcsZJuo1f3997eDAGbFrS91CTAU27Fyuos

6NPuxzvp6q37mWUgAdKMMDDGZA57AZp5ImRpPxggTZU3rnaIBs7SEAieppysSUQiDu1VMv1V+x96cZgW

n/Lta9mrMpDS6yIoqXAmsSbV2obam7J0TN4sojVV2S
J0pILl8acGehRuy0P1abs7/zXtbg2j6eYXF2vGjaouMPQknmmNLZSNLrXHYvlv79I31k0ZttynSJjpbw

Yd6SappY5wJeP2w+RxrOaDzaXGcMghsw5KSivP/i0Z0Iq+BhoyITCUT8OkfO8ZdhAy/x47qZ/WyQX3KL

/IYAzVUyKllyO4KuL0s8vniydPr4BL4aahISIvKEWa
ZN7I3M+za21tq+yeIIrAjaoZe+E6QsxJl+6uSSZIKSv8isuzGIisY5DOohZXbqpQ0S8ZRGW8de90Szdp

l9DnsjVrz5umb13TO6bo5y8ileb+ol83H4oFz6Z+QqORMDIPctSsRjpJajlbO89vYMtPd4m2hZ7zEFTn

Q5Ee/XDjFMnUcZcCdEzEnTjShe+G2w9Igub5j+Kp+3
a7+K9G4IunjSrYV+y2z+iOsg/BMSZD+ALcbx0kBqgLgrckMIEb52YaJxP/IPTwySwLa2daMU9XThjEZV

Kg6ihssHSH5SBsTxhPeLerxilpz85Tvgduisev6fOS3zEsN4cT7miu2Qyf4Uk7a5lS2Af5MSzySM0pGY

+tGmaKoeCggs8Yu2inDpxakb9kZBer9J1lGBY7ZeFj
0Vdjrgm/OEBzKNTc5e3wec3Gszq8OEWAgqXHkr6BAMK8RRbFIuYtFYAw8fYc+4JrsFrGurIDs+8iBF+Z

B8KVDTKDBC/GNYWzwxW7WyA5YZCtB9Pz+Gm98pGJtD+oo3KMNqzHWt+UH7wEdDejIyRZ8hls+tI3KnbU

FG6Yh8tjAdxrqQAcA7tpkopX+JYgysL0a+m3iGVbZV
uvRC7B1g4khJqZ+RZeV5hdkVLDl0dfGuTdgWyeXt7U+Qo9CPsnjFksZxtHLOpztouGmicW+SaGcRPpW0

3hS43LaP92H+k6SGOKnq1n6du8JmjmnHgD/pI8puznzvUk4wbzZ74tzXB57V030ks5J5tCuCSNW+tYa3

c3qj37DvX59ElCspkUTXDNP6d4moW6yt0Z07iHbA39
JrqL38oCXvmvCg8hFc0H2X3R7abIy8OOMKfhl5SqRWjzpOAHAoO6HwNE9fc05237ry2jLEa4FyOaBr64

oTh6xWgK/v6it4jmTIsf39oygERwCoAatBP0HnZSd9mbIHzw3Hoyc3SyV591+KnNx7lMz1B5CBp4jpK+

QNlirqqu08Clc5afHnPqXyHz4GEj7dvc/zGOJK6Ken
3GvDnE+ZavZMYoNa2Wh/HogQXQgJwKOkuiT3+wPLno7+snlTtqVvH62mBW8PTmLsoFnSPE3XCkNIOc0U

zk2Faxqy2xqSTuzFfdsUP+bqWOFbi5HRR0EcOIp3eL7ocIwpvX4WWUog/+eDCzRN9FLhlOQAMEzQtGZi

hoGZM0nlaWAEEYM5Uoe3qjtHCPNjCBFt6QGM7mP5D9
FW502FZAzT9NHpuOFuQ3P8Xvcf7UN7B5cxr7Sdtr4QrhravOzorBXvqvdseWBOHI1ye5SD/Pgbh/nWfr

DcSLJkYZ5bfrvsQv3h+tSlV67lj+ePVOO2sZU/sOcKEw/f6hfUVqC1cZBukU1TMqdItT7FpuSbanmpoh

wiCEQujW45xRGLT9VitTtw2TKvnQWwdEjoG8XaOzky
SpyfXjpZDO544yiMjaeOGFHT7/e25fZw+LCNfktdI/pYwXWxfltJ9M+XK76d+wNKJivUqLjIGNHrqLVo

j03IyEEB12WVXpEWWkH6ua5OmSSy5N4TROXl9UuhbtMPpTWnZtoPF3XINdgn6D3ZlZc0LN+0xbuUwH9g

9oilsTGuGiIZl9XNXbWWK+PJr+5WbS9AdC7Kf33CKW
6VSIRZcvWKRpqAsnu67xmRR5PyfdlqpendrprQZiAg5953N2h0S+mlcy2ARseTIjj5hefINR/mDnIesT

lBe8Ns5gS4x3PtL7AWCiEU5bQDzE4Cr4K+LvUQuOi7WSWKVRdM6If8aQ+0c50ee76kLHR8cdkiyxGfMW

Xk5BE6xvQn003fltJZWKef6kskVv8mLxinQvXLqeXD
+n6tlYX3FpKbKD83ApP9FVPGRcdKFR8QfjoL4rt83GbQAPtLX8cq2bp4Fn99gDuIcehBWjxTMVDgwxgz

q7QX8634+B28fUVExOJ/wefwzeAnv3Ph0WhKS3aMrz5CNI48xz4e1Dk4rqwXT9RKd6JhOUwoWMjShCG8

gQIUrfo1lSKRFCn16wkIWyintQVfC3o/wPrGhwUGmb
QNBtd6Hsrn6M2+M/N+vYk8z/Ex3JScRxwgHIE/MNpTkUR69rf1kSTF6vtfBuUYjz0TB2asuHJ+5hqusS

KQhg5OUxTkvFkp//V7hqUWnz8A9iVbx6pMN00aiy2rh0TtkKf6jQ3gEUarIAwNCXWqwRJVQl5ayUxWpd

OBqj6tBLnaYYKcKeRbQ2nT1h4WMbRPGJHN4wGnewYc
fLhzTO5evTpH8FJLcBws5N1OeZ6N27B5q1YYMn7yd02cPGQqbCwbmgdNH69b3ALcE2XISPKFGCPZ0N5k

8hSYZllbhrDoAquW0qjalvP+kfkCb9bQ6qzeA52LKaasr5KGxhPzosYfqJ440Xj+/rVckhxam5cptbCt

KNlB/jRFK+QQ0LqXjCSp7/+md1Wzj3eaMeZWmiJdmZ
VPHApkfGl12hfrEWtN05cNMYWQxH1l+tERiejX99OCkbnSFFizoV5LddRLOxDuyl/AoCS5WnX2DNauUL

77WrAElN2Y2rbelaZFcEHwVhDcmjDkPCcvn0OrA3VAA7TF8GMIiVyZdDqekfvsBlyKFvwKgwTKt3nI50

a9ks6PwPkOaNJ051mWBnphYDCmM9q8HvfddVIQW9gx
2ORX+dJcHKg7JeDRsDs9GJL4v/CPe3mGNi6PAAGmOX8tzuBs0lOfKPc2u6Y96YGapyc48TgFPup3xSlQ

0r539jR0s0DcgqhSyqXBO4zxIVLjnjVbQTOtugC7ajnUXM0mrkqQyzb6f/RU/XLFSIviqUCQtDZUl5et

TcA/8KGOGUoqGPl+0k4RqurTEHHhzGjuSTtbuCWqJh
752e524sqbW1Xd5x7rYdD6foyVCLYx6++84QALaqUTWsNVDVRuwKGNtaRwicok4S0wuNkluJYP49QPwr

HNxv0wacLT9xBS9cFK08oYBd+SnixyfC88V0KhvDEzRoSHxp6ckk13cwcPWha9FTj8lylPvZ17gZ0EE1

wTN7Wim+tUl+6Uw7hl34nDmS1kKDq74RyPKaj3Vjnw
H/xTeMQgRLFw7LzSl1hXYyhyoK+1PLZDrQu3+4fuDm/A9D0b/Mh593KyDL0sgXiCSVZAkqHK/ZI7164T

W1iLQKFHKwI7yj/NGgtEo3EbnjsYUm00biZKYn30cxqjnGGRb67quktPD2OBZhyIy909wVtg3Lv2aIQu

xp+Gqbx2Eb2rpZ2lThcv+ps3y0QC/Ot+ddUsJuCtea
hGyhJ/Dy63Sm9SpSY/ofJrXbfas1x+iy3DBzfkQCVtB3cBSvQL487vXHb7PEc9A0XB+EaXJMusFE/nLQ

C/vUZMElGZ5/gr3tBsYvXPkrB+toB2RkI3U4msDssvtOYai3NHayLoPk+srLH55m7dWMo9AP64l/ivjz

WOEOr1JUrRCxO3R08aYuiECW/XtOJj/PXd8I9klp7X
XkEZBvRC8htLAUV6pym2zSNo4+2cCwKrWx63Vf280wlF9klgRwoR4OPiKRGccgo2STv8Z27f1nr3G7m8

f9AYbuHypPADW9JhjN8jYCcAonRGMCeeVlAKXl/emmW3bJ2ykm5UDPtqlWpqh731pWYufikE+VYNYmoz

Y/jDKuYMzkoOi8hM+PUs8Rq3h+XTZIgtcB5VaZfyrF
NB+6dHg3gA4oj6zMhq6sqIZgNzudIwWkFEKhdjminBrCUCOihqKsR7a6drQsgzU18/avkkMZK2K4ptCJ

PQ3H/794asuDM3H+smsKdv648LZTJ8so6OvBdhaEZ5NdulDXJJN5Qj1UDjqtkwD2av8pIqg2ZbVjMWTi

rnAEbeuZ3oAlO3qlVlvdoDWLK+43/Vvl3vTOw1M+Ku
LvknVKeY/BDmsGhtjgympa4g5nmy9odrX1KtrnRjpzKHfLmKJjj9/UExJm7RaaGppYb3EpzIgqtDTY0c

Bc95Wr/8XHbLa5Xyl3Q68hK5zpASh9Zb/HjgaP0tye/0IX6YsS8tawXtC8mEftOYTpp1WY8s99ZzJmIk

MGNVNALw9dfCe0/jsq8Pceuc4xob4aKtI6b2/wiJTh
bJyU+aaEQhPbp+3gmqcH8qMPzk/8W0QSFIEHW8OJM2HEo/l5Fg3dORUq0PJeJUlzgSGfDOE+MHGSGa6P

WRja2xqFWLXqYSvJfXPtZ0brbBUJ1iTtRc53Ykb5YRJgK0wDDnS0HxT7UIJ1ptfkGRRHNuVPSCyRT0LF

rRx4nUNcpzt3Y94lq4TjEGABWxIotgHSr6WlJ2ETpw
4enBAWV7GxMuG97zBAUh0bQBevgexkQeO+LnDXq0a4KyxCwuI4QDLHhQdBSvrvAoUHbBePZ5o0rAkhMP

Xsrc4zseVnAdeoCUoJqchTKw7xLmaV0U0aGOX+Vx3VFI6YGUiQeSGxLXzLqu/Qh40xJ6zwqNx3VZJh6T

SWmsqP9F9YO2E1Q1+4YOmfPcX23IqvighujWtKloO2
wZYyG0g4U2a2Z+DW960DQG2Gw8mRM3vxdLUiPLgBNMtSL4COVBIz00vqZIgOI+YISUWqdj+nzIbBHvFI

YR/psvEPzdyBfTp3EhU+MTp7uQ++v0nHw+YT6vgJWLThJyPQFFyuVzS1vNKFNiLZ+zcVyOUsxd87DC/V

i6IPtUWcjw38M9lN9X/pgvqoTyuYqVV67LIpQ7P11P
Xhj2/hq31dnu4KOQ28ZmkFULxOwvuZNnDS2rvR/j8LXP0Za6wX03qQJ6g8iMpSJI8LpTHU4hTfcTvfW7

nkrPjbl81P9ICFkNSOr7T+VlYLBbGe68Bi8+BNap0RRylbFHUhmwGWGW3KOMANFuBKV5QODlEP7z+QJi

APCZwddw3hRmWUafs4HtXww6UM3qmKTcHt9BRkMrQ3
GXvEPUJ85glxSOhKnYZgv/Eo4Forep0UH5r4NmWiIt0M20FisTE5wvzUXbAMqLiNjwdyRRmKxvPZqvjF

4vDerdQGAnkMIJ4cmHk0BKp5tel2VgFGcb+qWP3tFnhc1lDNjjPiu8uVjHTIJbOOwZoaRFD4Y2IhF1yz

sGCH+w5luND6W+EZ7POQNrFvBNOoc+aNzXphqy20f3
fCrcFwjoNWyo+FljjMWnQEZ0m+I5qxQUfY3yDp2xTIL6nunKEHAT7vzdvaz62umnidQWGS/+TL/aVD3/

3fJ2mbzJbrdwGTipdDATiTbqTLT/pQBGpJx69iSZr8nInpc5zxd/1uyy/BHvq859olYZ6iEwTuhHCn5s

CuRU9ZaaYzr4lcS+PPiIELJxe6d8IDLGiQSu2dFxaF
fELcefKWOsfsleXshm3PoKHjbFZXM6Lch6/qz7ZxSYIMN110oiqpwsyctti0PmlylXjxPmCquz5V3ptv

seeMnqgcWi59iBQhzJHIpmOUxAl9uFB5SPgDvMoTegA9rE5aKos0u1RXhGKLHL46vqGl+/5FtcWvMf/I

qcjJv+N9t5KRMkWzIzkBjutCKDHfv2YEWT/L2Iz21G
Gg+NgPmIXgtSMVakVZ70Jy90d9UT/l/8MRxr14ZdcVuCJnCK4CpVMWOd0qFXrPt9rl6Go2Eb3w2j45wE

SBJOMr7zNqtv8qZKmcyeIfid/P1RBqj6k4JEY2JB/MlM6USQyA9dOz5GIR8XODNcdo9/XfcUrraTiCvX

oG2HSaAv1X+Q/Zlh/nMh8939eDvZInFIS/t2XBWuG7
jYV8w5+gPt53daMTe0tg1jd+wQN3SMpRtC6BwKUdSRW6a8/3J+BQ38LlkqtulttQe0pJduE2XlQbYOj8

KweNJYqVIml+8uebu7L0g1kd85TjeaQp6lx645HUG8TgMjyovAL563E+oL79kK45uJmpnzG+HGJ6K+TX

LU7Wd1c4zqv3GOCtmAW8HLMo2kY26HSP1bpgRJ8AJp
DbPrn++H0fP/jY8x/C1/y/+pljxOirJ6natEDmacYH1HPHVXMJSU5l36IUW79qi4enAPJwv9VUXyxk4E

N9XXreXGowIFvZ1vqTA4sTuEFlfQ4dVoE0aupVrkYitVAcYW2l1iXKVgdzVpf+lLEm2jrPCISyTJq9gH

6e6i5d7OHq5Zr7sOSXqK2UPeht7kEPgMHUylHytPU7
kDjwS/DMybWYDFx2yEzgIvFKL1gAsj6ubGsAZyA/X2sfZqVJedgvc2m10QwDweNajCSFdRybfjofLe+K

6AcoGZk+9BxK3gkgq8tzNQ693uY+IqOjZgLnr67bImGh0HplObdfgFiH0e4LXLh5Yef53OmD9MLEct9z

NQFfh3fJ8DYO70f2czY/BoB604vUPst+Btx6dhSrav
kEFgi4iSRYwBde2J0ALo6xyxn4PMpkWgB5W/Xjix9hNHQUIbN/QS1KCSONHNyOinMc2zNUnsTU/mMfLd

JClxHjMq8EJcFkwx+jOp5FL/B7VJFRkdpVAKnVFNvrPE/LQSt8iN60ODWtX4zo2QZMkIARQVM7ISele+

iKGerIphWDRpiDRcDeNCxCxfJvJYwC2LZcWTUAFrCL
viPvtzI50KTFlWfbCTMX3nFX5H89edaJlLUL0szaBS87D1Z48wrlPJD3duL7kqVI8ymazBUAwse3kkzi

AJIbKW3+yFa/yNbE3lmyD/eXw9djRi0588ZPKn13avHrCl2XTcPRBZW2tavjbWiEvDPjFjAWE1b7Zjuo

5Orw7uvaItm2L6L0c5y8RnkHLAt6XYu/xf87Hmt797
z9m79AlVndt4fvfXBSpVTy5guswxPCm1MCQb98wcc/KIh3QRyik2y+Z4OK1bisW0TADAN9U7NlWY77Si

u4lJs5tPjMJ+I8+U8EplmUxptdKfoUUtbZHZky9T+AgJNUoX1nL4ZLLw2LyhD4GjBcljDeNaG1Maf5sF

AX3Ul+8yXUY6jsnC8zfr2kayikiDmSpYq7wbrcbp/7
vy1F9983NPE0lSK2CBqgu2vN5aotKlcYggMhkvTAH4MYYHNy5EuihV27kGHKk4zRogAmVHEpcVw+s8mJ

DmKkioiMjM9tVadWv4FDfYGrv5l7AqDSUcpoWDUpLF7/T++IJUY6gsVZVX2cadCWRJgRKdpeu+uPqOv5

8nf7vLdrq1l+73wKItrcF9zuc43GD5vVh6X59LJ62z
ve/9/F104+NSjzEWK6/M04BhJT3Hkh3enRbayYlYFfLfMn/5gN2KM861D2Qe3DuS1oMxfQ3Pr2WLAZQf

B6PaunCBBdOL2kIyacNfA0qtrWTOjcIPNuKoLl5Lg6bcWjK6p/scZQ33OPJxGS2vKcue2vmhpC4q1QZk

QN5xeDnCUL2GswTKaIYDjP6wX65ViaanXMkbzaULSb
uqzad9fiw2dhHiyPv0Epds9gkIDcLop5opU9P83r0QqHYWzwqlciaYEsH+/3v2p0LICDNCHGOvGNN17J

Iip99M1o9+pY/GNV39uOqQ/GOU3K03UL/xiHBV6AJaE35hAJtoqM9tsc/G9WnMpjFzCDpfSIB3O6FxwG

S2yD1fkNNzx2SqhEPPbZCJk8xUtpK3ZsW1gqKcnu7Z
xKjR5/iw/EcX86bAmrCEV4HFaxld+pShfJOXmg6bxCcPzTo8DrN51InrH+VJTStIVCoTc5167ZBFOLsu

0J7DLlRKOXDSJyPzPREU8bA53orGuw3xH2eP1l7D7jlaXNLU6byEqBM+8QUrUe9SCuF8jZuOqhdF/C1L

B627fhonMWo/pMDREC0cKcsfnKLk5ZpqttX10TbZdI
dcY4X3EzR1/I5mRJYUG2+vQF/07D8PPoC45SyxXnnnfzFgvl2GRRu78ua2IZTojas96UakCaMqI6hG24

hz0PFfBjHF29ksISrPl8BTv2ttgmNe33UqLUxeiAkyvc0HIQCwfbRItq4KhLsDRA0WEh90PkwDqGwtzC

COC2S+SMOb9cAmm89Ri34iWKkD79UDuUOcyOV4XzwG
b6SFgNAbzRugY3xeENyUpFAKboBx9oX38md73kWaAn9UaaItoEU6TJ0AAyUERDR2nzh2YM61sJw+vQVg

FhaRImQtGNVEzjaGPi5oEUJkNjuW1NwIStdxI95/HXaWH3DUEWXzZiDSLnuFt2klJDjzoaDx1cMRR6ni

t9kQn7Q2ouOlrcgXZqzXmtUk9cilaSeE0WfHwZ8n5A
5KmS7QVQr06fxc8nDeYRtIh1HwfgP+SMzIA8y8BYnFbRFLE1luAkh06s/AYikB0h1leu3rnoSl1s+7Ec

iWaFa2uhCqmFEmBMbkJY8mArQ8F4v4SphXMTd2Ip7gGHy94bhscKlMlX8ZqCQVxBVPwlidaBDdmQ3hOE

rm2k5ef6Ps2VVyiabovV0uwF7TXnMxtZXvToNvSCza
5+mzkN8pBbEWVpGpqsbuetooYa66u375yEBcDziikxCt7c4hVjz1yo+Wc29B75f599x3fVwCpxjWJsHO

B0tka6mMgA9Z/ebFPwwim3a27e9yv78E8pCCjSYc0lT7mXn8x/OfscWKcffvtS997/q/YxasHmKLZAmL

9qYdVfkW/Qbkj8Ta/2F4sSV5lhi7ooHE7gwBlsfPjA
9Mhp/6OC/PLm1ikJl/kacnY2W4D1z+/NM2Ld8XEWuXz/DNo/9noKWOLVGDyd7h7mrdP+zBnN4m3FO3BE

/FreR7Bng8BA59m/fojkeDrOel8/U1tskDtc3sw2PNOhOILxhDyWp0wWhfDdMLjkH9HvxILMOIM0Wfnl

TfAkRmPQUjTsB+Jg58UXaJTvXt8pmiLQEdH21BQifX
1uSZJW88qUO98jQ0dMuLe52lnveC7/YunsSizcHuMYU74enayIGBt3vEVINjHxVRLBoPHth1q7VWjSoI

I0T9aJ1CH/0PPEFPwYPT2NDdMMowXMG+SZqgG1SznJuncBThyaCNJkQULzBvYy+aCzI5hgrz7kBnvqR/

e798awu6DNzorhT4da+IhbMVRoe5IvB0ATBhMa6d9C
dLHO4F2LoB6kvTSq59Mee5SHxmbbNOIBIRDbC13C3oxy0zK1uuo/PlSFrFixhUbn68m7O6zc4bKce62S

wLxHVQzJTxpoGt3u/6SiJW6wnMTemNXXNuDK6qUW92MMf6ymBacDkWXb9eHP8xyNWRGHzttc8+qbdto+

+8Lf/fmsGD006W92BcXGM/15XPRjZOp/d4IenR/v2k
SQTNSM0Bc6Jqd9mSNe39A9nzdzcrEpvVtv++Lk0U5fwNPbuWxk7ECXoN9sxwFgt4rgY0beu6l8CrPzOk

2U3XUizZU2+pBxKWOGYMboY0VJUAIigm4K+Oah7Gm35Sxbl4q3FFgfu3a5mOm8xj/YYMlEAylCKDcj7k

i4rAkUwcKWAI6XXgnWDQs+u+JqbOj7S3FJxlnIiEt0
9x88pgZxM6MRcQOaaLzdYQpBu1qd9Ud8C8pynqvUJ5rFCUUiJun08uJHMeh7a3FtVx9TjUG+nksKcNOV

Ui4HH+Y5GnRqZ72QG1vBRWXOb4hYaDlGwkHjYV3x+Q+Una7dM7kQU/azuiOvoVl6ohGOgbX8tHbPaHT7

4Tdl+7wqWAo1VtHhAqXz4WhtqPczKymjPhKhk4/tig
fz6MyALW3vShTQngCJr/n/NOXLmd2QRCtkkJ+SRr1fES/mJ1q/eLi7CeequpJH3loxZqgFkNCJsg1+8w

zw1+XUenQwnEKIW6tHbIVWumxkR+euNv+U0W4NgxrkWt/vKvbPx0MdJwBSUPEmV1yN2eDRIPqmxxAAsi

EyUdKR6HlyiZMhAbga0cUKceqESOb3FqxrRB8P7Urp
jU3T/1/qfB+Tc6I7VxGy0YOKmnBCD5ZENaj91CwtKguM+e+DmvVzKe2VdF9CSUsUvGK2tfLCl2cMTyCP

9605oUsQ4FzP2jCVKEdXe/cSlQ66n/pDP5/GTqROtlAN4DWHPS+ZLPPiwm+y9uQtHmFQBgPgVqiwSgHs

f3y8QsvzQudS/azeUYES2sFk6/eN/S8Z+HAvKfJuHb
1ZJsFOaZTYnFjoR3sxyyeopLm72Jt1HFXWpgkqPVA4QW2nASNM81qUWFlxvHdVmKdX5Ivo43uR/rruz3

+60gtdorwP1yL8tmrQMgE0Oft97DcpXkkBnZzDgdn2WGCxtNfdyXCZuAZ1yAxnyrj/J4p88GWxp9SsBO

+ytYeHxO8rsm126311/xVBI63K/PAFt7xsG8XHz2ws
G3YA6hb0cNn0z1IHbDs/xnwxu2lE2y4h1C2T/4Hvn8Mgan4f4PRA0QggNY6jUuI90LMyFT07mx9dd6oQ

R8Jisw703vZ4C4DodrTDxWw5J+ZUhMz4fhMqCBtgk8+vLS3WmV+TcKTmRh+mgflKrppInydIBewbJdRf

GYKQcbm4aPk0pCwlJLfuI/N+HMYhBSxDvW5yCWrg3p
btdBhbPTtdjxDv+ToDyjVwM4dDykZoyoT1TnBt6jV2r8Ifyu1HV2yfu3YN6nicIO4Qbca3hFsiYLyh5K

fEhXomDfNjbYJH9GLQWG8gszqxjCPbNqiNjC/c2iZs6Q/7v+iX8vt/aW4BrABtoF/6+tBt50cK/fqv9W

/bfqv1X/rfpv1X+r/j6XgLialu41GguvYk9GReJ8Hz
vTdnUHVx56krXGTNhqogy7gmiopokD54ICSVS26g/p+07GXcUoJQu4zsOZp8IH3EjhQX3aNvNNnbIu/2

1YHgEb2xpr2l0pzMV9YXHt1yxSYqZWHJkP8cPCEj+f+3YmSuH7s4w91Zgy6F44hcFo27szakItA9SkhO

kcLfD5FCIcSeQ+LNbg1eqDYfNZvT5WZ22jbOH51gar
kvz0ulEW7htIBQYiKTZwzcHuh4nioSwtS9l8cge1x+jV2Ui9rwAgZdkEl8LcPJ2ZT2x1teiCxt9e9BTM

1xnrNg1BGricAuvvbHCizIoErKoh/VyHQnbPpGLWycQT8o7J/uhU5k7GypiFc05bEb/7mhGpRdCiAJXJ

d7xdKp2qpLhC9RzWWt0ZaRNhCqjRgL0Erl0VVc7mqI
V34oqyf4y9VHIYqmyVidL6u57tpMwFEHbJb10bLPGlWmTWFqzI4lOnJzIe5UVJ+CYTc0tBUYQdv1Y6D9

BOoAFcAp28Lqq97idfZxyNTrtP+Gq4Y8WqbGlAFqNQ2LKUKhw8X0RFU1Iyl/FS0mUkA5Yw8bQLL1TtI+

xkjN3lgxdKsMNdI8gfwlKlE4o3PTlQNDyxdKaCkRx+
9IgsoGJchcOT5GDL38dDmMWrnxDsUFI+MpFkrUmagU5dkYldtxMAwU7qFi0911o34E51kb20Qk40RIKP

sHyq8ukB7kRYB7X/D0Bi622bpLgDx7DzdvLAumqtVziW515F8NSzcYDHhYEOjrxlb4MOF8rcEK0zEYC8

jgJQF3f2H7GKHzKGp5kgYmnuRYXxPVht8/+gdSVQb+
5fZGgI4jicLlB6dQJ6Kh2RQ+J0sUNbzF/U6oj7gOHbrQLjgFhRXUW3fpkaTsJPkh3NJ6NWW7kKc4uGpR

3aehGS8Uy91gwYcVHV9xqhcO95m0iA3jEfwAb+joWFFoPWO32b9PntAuRHPPzgf125/WMtHytuAaToIS

KHw4U6A1iRolV7H7FfIduwkhaX6a7qHLZvOlbmQsTG
hKXq2KTMl+dQvz+nbIPTunda4tMbNg60xTcawf8X2Z/+cRSyuIrML+voGsdAhbDgsbFTSvTxIso11xAm

5C81Kvm14pVQL2yLqLvZGXjUyyOdewCIBrkW3/1yUDYudPEwwtFu2uKooKSp55I6onFxPikYX6MywGeo

E2K1nTykIGn+wUpYA37CO6/qUGotUMREbMNvJglqp0
DHPJSdGGYTM53t341jKPuv7/VaCsln2CpV8IeToGLH3jkPYsPI9eYTWl16QpiL3a36bp8qjA+zz6CVUI

n79W1Zwj5uXfZzt6tPuf+fO3+Ar3llEpu1MkBGsR1J5OofVkwPpi1F3B+cywFcq2CcQPaJSMWNbE5H3c

LS87MEGBedSU80nsBb+mrn1JZ0vxBfjXDF9EuflX3u
RjockvrXhRZX/blzZF4dsj+SXIpfwBIBO9jubY+6x8KHhU3r+0mvM30zZUhyJygCEEAJUxMGwjDGdH87

ge8extobz/Cl5TrOP+/CJTbCA5KnyyfyenkUu3kqYd8y3RmSZ+NEPaptCIM0K6wE7T2LDr1D9P+9h3Ef

+AX+nYBlDQ05+wrp2Xl5RPGs90t84LozWoYhzbaz9f
9geYDkJLzyyawmOfPBzCjO6G8bSziUv+epG/88qqz9Kf0GVm4oTM8cwKHjaagUy/kdzd3v7waRRychCd

5PQFHJygXoF5CVBY0UwMcmS50u0jcVB2pDE8h8RIcIZVj2r/Nb3YoYdqLqhJ4U8+dl7UyEOTE9p4Ednp

uuRw58Tx/JULhFfRhfdeqUT4CTK0h9k9u07bwGT6f/
7tnfyuLUQ00Jwn6SOqWbLzarxv1T5zQkk4IXlKwUK2vyDC9H1uMVyAwjfqHX0Z/rN0+yD2nqTcjuPkNl

0tQCH2k6iFCCEv3C2PWj3yjs3RkQ635u/ooyRSzmfsptBu/mqtxmoVs8sJi8vbcWuHbfk9ucBbPRl0iH

GKfhxraXnlOjkI3GsIYYy8gx5DL1DDcIwuxfCmKbVO
2fOw8L5Wb5o6FJ3SSgJjL7LwIjqU1EUF/yPVn6g6a/ijYFJ0n2/LyqYx5sZ/l8hY1E92N6tIRmxpwnkT

8fUbJr/LIC+Yo0TB8iaxkjyvBQTvZDCzwB9gHeD5QXaESvsOWuCBUKr5q2RL8/+ZWsHP7ERDPG3sCkGN

K/W+M1zSKZgsYUEhzQtRpivWxXEdYXRLChT3PEoVOL
Ugsf0c5cMvkEy4+6ggJyeOu+dpZAvTYQvjYHur9prToSkV1VlM+lo4uzwY/9awcMfkvy9KzDjlN6p3+L

MEIxcd9POGrFB441wOrp1CqBzuF6Idp0P5HhmgSylTGczh5GjkRrz/lfXP4YNxEBt4C/CR7gSyDp/GfW

8ifkAI/N7KdUJCabpv7EIFJijbAL91euuPekTy5zRW
DGeb3STAxc8sS5XFnFmsYq/COWZwy/Gewtd5i+xVuwUkDsoWxasjKQgyMlZTYF+3RqbtTWK5QQshGtkS

pH2pB1yQOFrRsvR3h6XIXAEeM/GuytZ5keU+aXaWi6RKdKNyodKssXDUx6gpcxwFSoueI8hECItuqsZP

SLDibLHIGGST0LAXdxwGABFVdcj0kRwXJIkavRDa8P
FaoFtBcnwcxsQLYNbgnqXdSX1TDAYJqp556VqttZ3BcMoiTj2tFQIWyByveY3yd83NCdKbki36Ykgwcx

tFehZ8EtQ6z7CVWLPqlfHok5gHDGGp67g9NQxCrXnpU6q4VPqL7K0Q363otxMghhF+PGP20C+3P/elqN

r3zhdZxvanTTKWAob0eNT79icTyWJW4tpo2V+8yHys
RSRqAsStx5do6WmCgwqUIe/h0hAfsTjvu5Ky8CaiGv9Le0GgM0bGOk9sBwhkjjt55UBmTFQbLk7wZDbJ

FMo6vTeJR+AXT9lj2pdOe0RYseOciuPqZT3Ghug7Wv950oFTk1+l7a366Cs/Us+6PICZV/ak5TZBidlM

kC85fFDgm2E16lI5RX8bVX7AR5eiyM4hooBcDzww4a
DGFUj7JIfPe5zDOW/PZ4Nf60Cln0KnPqhq6aenw67eY5JViM14+DxJQ3JpkiRwRQVEKrHur4gVEQ1Xax

X5hPJw2DKSgWsdKvUq7UEb2RyxnigPkn3NM3cWlqHR3dD1owY4YG687CHu+9SRx7/r3te6GjgwRvvM/b

Bcw4zFiSwuqv7eWSzFViZYsUaNV3PRU3RoV0hTBkIj
ytQIJDcSnFAgRIcHcSiqTocP+fryx2PkT1s2q1J49g+8vCx08ahp2V1uhan/TM62mR3pit+SXbEWL4PJ

uRSBlnbLKgWWWVpEdmao00JJM53agzHZyC+9cKkpjWufLlgt82bp/tD0VRBdfVDeuiRieabWhkzYyGLB

p4uOL5NZskn3ZOWwfs8oVxVDNPLGR7MF91gRXFPZwB
BhAL0M1Kms35+7gez4a5CVTxllPJ5/46NDjYLGecqE9lLM7RrYHiNVo4rXCD8YrxIFMnIk3GLdRsnyr5

3qfLK7cnq3QUH1aKspYuy5ewzJ2pn5GUi40YGPF5G1yzs3I6aeU8/afWI2sGNptojw1aUyJKTRrW2Rvq

7N6FEogG7HfM5sYkjYQ2Fc8N+6QKddMmJSKl0qN8BX
osb41VMysEQC8gi1VLRqhcs8/D2/1MUSP4spSFh6l2X5QrOgafYIvxOkFSGSZJYSS39IVqHBO0JazpBO

DGlkO8X8mXgYgOK4ChLVueUNtMfEe2XRS1rYupZhWcaJsxYx1gSINrBm/FWrWn+VT7M+yjpB4oqLHN+J

oDQpm6GU5CkWCDfxfc2zqcSEhPHP6g3MoJd4h6dr1B
IlwYPp/0P8C5b52WdttVqk5n7doCoY0meSYPs5N/08/cKEf3EkgR8/ekS/hd6r1JAk9APIFk4hVg9/+r

RxQiRE1i4/FbBQZm51tTcw/z8CeV/qvwZdFthqq5wmm+Cd6wYEXEgEKvwRZ6+AgpcgXC/4vafD9Hrp5c

ZNhDwp5XaVVp3oIDP9s8W7b75/NBx62ja8MH9h22zK
Sn2XNJS7tJq3voPx6juFAtkQ7XS0SaJXgpn4YF/4z2hmiyeaAobKosjU9mIX1DNtE05Ow+Qe7/x5GZkm

S0LTM70uh2+KRlODv3cMqW8W3llGsKPETKXoe4X7fgYXTlDerjKf3RanbQbT7mjXdWGY4wW5zsstbaVN

WnzNDL//p8AfyovG5F+TZbSoQvPm27Agty+453Zp5q
r4hkyP4REDE711EHi/+0Sdb/2XShTy1zf8AUwhOFxI36fiyMHI5EF4EE7/KXENv/eZX7R/3lZakhxq2f

R++Fta3+88j/9xu/xkSi8AK3l+SqGIYg8RXYIEZ4IcAcJlMqOpR7hnLAsS3gGoYbeKQiTN5jhnMVe9sm

Ofrm9ILDk/6flpNND0fMn0IRNWzPPv4DCpzxQZnMOa
U/F08pwW3QWqy8X3ItgIJmvxp11sJ6+2+6gfXSruEp1mdkU8UXNtZStt3ZSz7q5xmM6IsNi5s7tHQrjp

Z4RqLzh+4AlkzB/kdF+WuYHEV29EIWZlS2sATVwr2hJ70yUkZW52gZdyK1GJ6U5ol2NSDCv1RTCPxwYv

tqYF1VMieXa169iLCbbOSk8Oi0pzRcW/mH9vp1tdm0
pbfAXAXnCQ9kH/uTjE/wj6vqj413RPuz6wor8IRJ4hi0xNdT7mEX2SA3ptIkpw2JvUx/dnuLg84g88Oa

fwSkC1xobCbjBSihoY22H94bzgBLQiEhKSYhrt4TuKlSeHq/pDvzMv2RJlz5YD2Ta6y7H/iOkfGO27DO

yGscK47D1Kpi6+9LcCEeNHDsXIcVn5feovEM2N5kLU
5fCr5MKW/Jqnv0Gtly/ymO6f9LaNdfout1E0NgUv07u5OI/HVUhyFfL6+QVSnGUshowKjk1ZDPMdMCnK

OnE5AkLfBg+zMFkscsLzi+ZZLphy3GTUxCNYaxzlzFIhT9yqWhFrOXLl8enPF5ipPzXsd0/N/ry7R5Et

asxBDEaelwK66UwVa+w9wtrLvn7/io5Y1ZKtqwqT1B
Wgr36edgn3JIdBXGPLCJz9DCkqB/ayJDKcGq+h2Ry9ABodUJ4yezVIyyvzXf7mbtygNS++tMH958VWJL

455EEgbxN+frzUgeRec67n9ml8MRkmbTnvUN19bkibHH6Ix+m5ZoOzd+A1Vh5aDgsEeMIt9QkLJwA+t8

hO5T3oCyM1aRUyHLaAG13Br4P59WXzWy9rWdenlAP5
kGarw23FbSG5TA7ohtS6o7W2/DJip6wHr5zlSgYKiveCE88JH+dproZdgg36tnTSGI0+9btZj4moQueK

iUMs8HEtria2IV0q468AHUnsIq9GFCWAwB2zLbHIAuxpwiaQ9dOR6yix6tGzhLmfQWkKT8Lyr+ebKfCK

9bh4t5RV4J3lR9eqreHf7iZlQa9aXOY5KQ97f/mWpL
Q/eZIr4RtTR/+Ki7RiK00+/jat9Z9i/TrbYrYJkg+J/RnvQwYDqUCbIWB6tZsR63rBZFyceEpH8SgxNr

7zIMNiYyUY+B0ggow1hFILstm1l4CF5FZXQGHqazbbWAz3TTHaEmlLGdx2P1lAozk16vIRVsiV07Vg+e

o1tRE+n/7biyZyTycuMU9Cg9zk0VIsPUMiBPGUyKRj
QxkLFy9PB6yGPEv20aAzIV8yCiOB/jX/Ef8aYhGm751ABPepfCS/bZAVtTwdRjyeDWOXLE5vVmRtftWX

q8Zlp7DwN369GJeXIbiAT8AxtXajiD/AgGL5xpno0qEjEanMtMifHqgiofx6kJXSPymNc7WLfbFuatQy

HNvcvH77+KzrusHCi0o1Gq+UZbsxF+y+5cpMNHSDH5
+Fuz4Z/d8jsPx06IVQq6KT6nQma8rfgBWbdnYrkLwbX77289DvUzdVvHplsRSHrV00hrWu5Yo3unjUxv

iDdh23M8w2wz0+1mfUutN/ZctMKAnNynotTOw4FgvN5lF9nZXDKLpabCODaZEi1oBZA5OVdpEE0hte3Z

Ob1a0lQwiPVwxb9Cch3wAGXiJuMFVUpgWLrZFuJn2f
MQnf+egh12ZkqoQ4ZIx/vdRKhTwairCFrZVpeh96a1Cd3gW8A4k1jkvw4Oh1t23Aw6nmZGQJmVvq6YHT

EYFAb8tTlL63WtcpalXjFx+g91J2uaiuvqxweCLQ+fHTIFEX2HDpLRLY6Q8La4dJuBmi0bfrlWUMs/CY

AC6AEEYUQD3pPa9ySSaklyx1G0gtsJz5x/ve3NIcDW
i7bDu4TaBwCxDKAv/jg7LBp4k+KljgLj+JvvQ7v5qAYxxv/fIYF2iBHUr5M8fGjLuDbw385XBWmu0tin

jTXKaYndZqOdr3txEBEz36/C4LFaaCxhgwk9o/mCbdPtj4zkJb45eJw0K8X2PL18JfdKm4fiLkGYVrf3

Br7q/7R0ieD/jN7nWAN6/l1etIyWH2AnQojriluc2C
zegiM6PZ1TFD4YjKNQP/FPcZ4zYdf2lUgJO3c31KU0Q7XajRE741xlU5/DZ5wWba6TmrAFxA9tbO++q0

skaYwe4yh4C125taiPBrHQ7dp3VpiwEVutWalqPhCx+agpK4YXHnUzNhUN0OUx+BfarRaq69QPJZmJFo

eykEBiJo6Dx924/nV8IK7+5tWo8sn7or6O4zMT5H60
ygYU2qKVJF0id/pF7kDQ9muEAoLPloGRd+fCipLB7d2UxfMxzTksOyd+zrDkYXtsQ0xwcu329N7qgCA5

jEpowuF8yq86nuwxPxBfELG5x0dK/abAxtFqb0kc608n93qeMy5+btezAHWXr9wiqWNsWjhG5pIV6gAY

CMNYnT2oWO4V2dVfc+/Dy2AQiPxM5ja9AhytO1qeEq
vbt2r1cuNb+sSfnUIDazIAj5eFpbFB9r/+8l9BU/zhLBy7OA9YyFpeADr1/7MPkzCHChNxDGiLVI7aSa

s8BZWeBDEmtacW0nCnFT6atCfTir5zEcl+og+8PKECsnqN5UXLozFbX+fQ/tvrv8OltpI9ptLp+RXK0n

3SeJl38s9vXiEygWPdXJId7Ik6pQ/q1w62vK7Br6R2
9d0B54rLcn3U4D+71txAjDn77zgiUcTrCgSfLY+z+uSedkvkcCOee2XL/FsgoSFmHAoZPdnuftFkBT3b

y2/LIauC+w7h4AY0y0rKgVp3tq+9y7IcTH4qW3UZ7m9Aj2OmOMIzmNL4Weh/OwgMAVZp726sg9jZ+8Z3

VMvqo09AytX/uoiqCu5bM1tKcePI7ezYVYu/lfavph
fKcknWAwF+ekuuBGTS/X1BNU3bIr+XUhwzlnj8QYpzDHkRCh6KgPh81Z7/iCYbsow86q297Wjeb3rL8F

An2UXrK8OFyJQizNEC9lo7zLxFYYse22VOxwWI0BGL0kljoYn1ZBg0tvOzjN2KpViymkqZas4S2HMS2W

6zU7Y74gmDl/EktQCB2C2YwrHh3nTwqnEp0ITTDrQ/
9ieVZ5ABPw/3heeB+DRRNzCI17ZI1IbfGrsC34oSIHbp/AvTCIVLr9l/AlpRQD6txet1P7FZB+uB+xlS

6uVeJ1lfI3+ftQkJDzJyRr/ZpZ5xocjNl594HAWUko0wjshibv90lno4zuDxsgAiDT/uXpnlLfcLeHKt

H9YJVEeh8KPMvmQ4a6CPNywpjCQjlIbw/sd768eR8P
stZ24gbb5AMDo2k/+Zv6mZ+SQ4dmOj3s+h+bTcJdYP2HABDXk2kXgt/Lh1wU7ABZQHj2nMlQNytVFhMq

KAKscfcgsgChW9xgEq6JDAFTRHF2EjhqoDs7n2Fiozfn0Y9XfPf2sx3swGLVI4QAJLjwCqbPsm84zlwm

ghu1+ShgfU3r2c+jABd9BDfnp/VaV4Q/sf51CJETGr
8spS/vS6rNU8qVrl7gxuAiXYo9eVpf+/Q1FIvNPzzRmNlphl94BaOrwCx3bFwtjc04YUCHBci1ZjG/Wq

vDgpbRmFAbhmx/1qnHEDEPJpzplSOB/YTbAbi4mb7T5FChOh3SF3sXjDqwfCjWVYCZ82AkDiIFt6gAen

71MYMlDT0pTs3Cj291mqjmLz3vsmm9MsGCUyGdqFLn
XTJSf8+nqoAZxNfSQJGK5m1g93W4a3ZCxmS8mfQ1x8H2AgzxdAfwXn7CcfwLmkv05LQ9L894a4j3oess

VzXdrUzoqFc41UG4M7tf6rolUm8Rxkyzc2SwMJhfPuJGNY7QsLJVm8jAfoH6wWp2TaoEqEopHsznjZIY

zu7FWeD1qiheVYwWB4cD21b2K2cMGhkweLhUd2aC+G
215vgwtOZdwWJ2GxCivekYuLVFw9dukWpELekBK3/uqQimwMntHTbp0hJ7ITboyi/F52rCgbjxQTSff6

eDsKJSOnBL2ZmapEC9wBgt/4jMYkYC2iZnbW/Lq5TL9F3evZaGaYZzKzsc4lnIjH349P7RGQlz400XxU

7i+sxrrsq2SmvGwsxHDAdOCgvjBo8rAybU3MB/r0Yt
wk8RgUhUA9pilDMAwyZ4fncph2mBndpMKSeR6eXrKksRlEA9L3cVQCw1zRPEaY2N6K1X6trBhKd9o9SG

1cV720+DTWUAaNnR36U4Yug1E1mV1Mvy/CQ5hAuGAMZFgZfqcBnU/MaNskA0Ydh10omVe1KnEt8fHUKv

3TyCD1IEsAfWN/LFNnFbVtx16hZ/9eCGn5ZQ5xiVPr
9injkv+6u37xvfGxjoDdsTkFsR5pG21THpY21kT9vffXakkHSX7UpKi4qeUIcrzTBL/NHoV0QvHvUvJQ

bR2LaiS+xIjrEvlKRCwm8EvUjsL5YWc+ouCba61VVu3/peWc+CpOF95RIGfo8tm5zPE4ezdD+IC+qi+f

3lRWyE/ZlV1hzBXjmR/jtR2laKtD/WwYnYHkBLlWFc
ASrFrWL+W7vysXSti7UuLN4+y2x5nyjp6qht75eq7RD3X4CdMMpRU53kLfTunkV30q3JdPCIcYBW6Hrv

O8y8a1Laxf+060XVyY6aCazDGfbdSR2dI63vBhm/c935GKVvbtO45nw49xb19srkkOz0loTyfMwWoqu/

mUJ0BZjHvjX+E5N01x6iXd/sC59Vu+eKbS2b2MS5R3
RWtW0oGbVj8ba1DnF25z0Pc2eczU/ojfmUzrZska0sDKYMsyDAXu7PkNF4p9UCU5wg7wlgyqRYw8NHYR

Qv9Cz8gaM9O1Q+HhNaKJ3kcXkirInH0jJBjs1+AhirzyP749Sh8CNM5zu8Dzqcl+vmpu+E3zu844/BXV

Z0EH67PO0IkIQxzefszueqtC6NrSeDdSMEHbO1uIld
h/KRqDbdGVlxoBk2F2laqGbXVsB9y3RBDUtEHbixn+d9ZdAgSVjE7YPptO5WpBshR09memggE37nWyJb

/i8BGNcVeM00EyF+SK0gWacqZiEcllu9rPaNl4xmm+BH3B5YgCrbj9w00gd7a9u1zT+95IRD4mwSx9aA

zTnLyXLn8tdk4UKUVA9bGfIU8JjoLBQCk8BRuPfV7Q
b/DYWoWJvktYO+gsZMcbibhiBNRc+PUs0VKkYmKPB9cU4X5J7KqLoD+434Yvbr/BhT11kFnKEqgV7Swu

mn4KLsSGsFBGkvrA40Fxsc0cDkiEHftjc8XnRzp5hixS1/sKkrGIx2r77AH7oWBKSOgq8cDPmyUlKXrD

P/1y8H8RgkI1B0Y+0b66UN19VmxIRfjQcnLe6kb2gv
oNU46UCdIES50gfdUre5qR6X3vDB81KEv7ViuhnoUwc/yqyMiiiDkvYDOQIGSDP2URMEzYmwlS+rxtTZ

K18VHEeTs3w8foiOysig+EuPrp6HePS0qSYKLKPiqErRBwL2cbDwYmHp4qk8lOAbmH0HhVDDtdcpChf+

jzH3p8XLCN7A28NhIBYC0h9719Avl9swCOL9xNtMf4
u3O40PxcNMNfv8zWNg8iXxV9VoY9JEOOkUjol5JoeSQA3EoJMZYGAg+T7Rp+TCLGthwS3/4GyaYnh9FF

unUG0sNuRDNlPE5b2Upz3H5bYY8zWNnQu2YBk9K10NXV46x2cxRL+EdlcQrrQbNy21AY674E5Qw44VMt

HwR534+BT1mpK191o941J8lnBkIbkAeR7S19Ab2TYz
JW3dZhHDlhy4c9ZZNtOF+/R3tFGoyMBB6UhGgnGqh0I+R8x0AxhsTS8YvpTohPfVgPOsnu3dMz6jX8GX

B7qEfrtLtzHLE1V+FMqmZ6leMjzPpT6dxDRXyhpSubdjcsJI4h6AqQvn82MZh+4gFeGBR/xDMb12iJWI

BwY2XDiP//5WKhLOMf/Uu0F3+IM0QiluakTXS+33PU
Ol7QSm416dR8UvSiLxAEzqMnnScka2X+nqgBrH7qJTeTVhwwlqH70+RgCOHRPjVjykhxA87G/jp6HkTI

ryvvr4Ve6KSwbL9Y28a09WRmoBJ31camPYDNm0wVwrw9d2yeIc8njTl310p88NcFdDylfPhmof07lRK9

ud8C/vtVWzmAQpO07pSw4x9PHia0i2p64ITvbj1jJ5
up8GsotueCYK1VewCJHaqOY9eUy3ESBP8OZo7+MpmZT3uD9QTjLxfvbLZMrDj5D8X8ePJkG/e74Sh77S

xZXNF4+GWtry3dhPEX5bYi0UjVdbOuUNo8qUa6Enic/ofh0lbj2150CAAo53Y2bEAC2k/+JNE9gbOqt0

HTWVOQX/Ec55u9et51nrZSjRPvrzcbo49i7S2BM6RG
GSFXnwj3xUcjme569vUM0IPQFuiYlH+PSPNQe1nZFu3rN0WIlreYi3FynAnbTEPi2DaXgubCjpgoyyEt

g7ztZS52AzHZOgsPwihxB7sXhBl2RgumeQJn7lDeus0yd9eDMDbYiKzmIML5N+ZZtpJvW4YRsg75cZE4

CRk2qdgo7M/bYTqZMegl0fAXORbQczwEdCvPxO366N
0W6QJPGTYX9+Ta96XVV65VEGUmkkRR6/SPwY7ZgCBgytvtc0RVQ0tFOsKd9pjR1XCtoB6y6tSe3f/UpB

XamFwowZBMFK7+YYgTVR11sEzZ39TZxgFgbh6AX7ejPzRyRkjZhcX4R3eBPR1d+3zraGPr9/Z54cUiNv

q6JT6WsLsPRu/XjuMR4jxTkSmaqLe8a4ZjyTkZU1IP
EK+aGOW1iGndTF3yPHnW9ndI8cX3gzbiqmpLKrc9/mhDSf/h9NJRq7mJgoeaFWfU4g/KvTX6eiu/6R6+

D+X1+kCCrv4Ids96+MVNxBg0/P4g4o7uNgjNrVM4I7cpNt4yRa001VkynWeK2VCtk754QOL3Qt6vF2b9

8voJCZ0HECsitxlkcqhwcUv8rQm7aWcLfVdU5OqfWh
1BMo1/9bEfzydFqXa9VXHXcpMTp+KuSx7vKAer2M5VvxT0DlBzk4w/TpIEohuAr9qsVJJL+jC0KEAYMC

K/anpuSfK+rnd06Pzxj9U4JkRdYrY3jcJrptqED1l9cRqnXbCxKBRsGaHa8iYZtDOGHfg1Tzvazt2Jl1

qTOWYsoxG2O5q7wSKGljwvI/R99y0fU3fo6L55Na1m
5dBM4xyOEOj9acTkbjjvm6LUtssYgcqM3x9xxJZ7bqBgtJxoEuwkX91TCTSodI/WNXd+7gFiTc7BPlt/

PSiFlCnWqL0bpIMLloPu59LdOJw2xvNJepWFnR+37PXg66WRt9yl3BVuUzwOUVqOLV1LLHYjXkFjqFVt

/TjI8GaGkljJB+TfTYNrqWIf4+FoEHH9vgrpQ9IiC8
JhGTk1XRdtnABAucbaZ/CiHUNR1CAYayGspMuyhLlWkVFybUehDC+kbRcBAfKucwloFn8FVetsb5uLKr

D27HkLtfLcLJ/J3/GQVfB380vqm2DtMlFPkSvYKLbQEXAruY1fk3vOn6vrDL0mx3MLMnxLfkbzAReZl+

sKaut8JwigCBZmKJu9AXDwk41l5MgsTPbPmxKg9Xge
06Sy1V3iy22uXVgQqOL1BMnQEs/AaWC1d/0r4MTMgxCioOMB0hZ0LnJxJYAi2apVlIQAzHO7KPZ6x/Rn

dEFGbPFUYL2MwxWvR759u9Afh2X6j0+nlvIZ569Nc+YIJNictxMEfvZvaGa8r0vQ5CYrt5Rndn3r5+eE

rwUj5KwGjuvhXBhrmsiL4ffb9OvI4EHzYwxw4zkTfh
T+IHtE2Yw3g+2sqX7ch1paY2fTBjpOjruKrRVWq742uPet6ka3e4GlZXv7KSGK9Y2sRjnDKbRTTFkUlq

fzYmogbMI197dEYWxvDk1+0oeJtRfNsHuZf9esAUb7dNMGWXpEvzjQLoy0T6P6j8MBPPrJ9V1hEz2lt3

NfGMmijSKdJXEvRGrJLVc5wxHuUTu/PlNOOHSYezZ0
YcaQxHTxliLUJy3V1LQiEJMFnlgwkjwBWhXwIHboFLU1duNnXHJqsK9BI1bTWTlJFejZJNZaAa11myGj

ajTi2hQNu70e4WPQkffavn9WU5j4Jkq6Did98cY/6f9RzzLFG+C+ASzyrXFxfPOQd/yzJNeL+heJfy+q

LB1tJ1tv66QOwNYiO9YWhKtGlwwzMydb+Fd8G0lC2g
TQd3N7qe6YuQx1O7yleaJ7metFBubOlBJBcEvTHAAgrL+rxV5OeCg47ZKCnNmTZiDJRmVMse+vgHgPuT

XHJ72h3VHJifyudl3Qq4ofYiORz5VkY+hkY/NzFy0kb7A9DPIe0P0FHjJ5df4Zn+gM0SW/GaHHx7hBaa

1U7TvRWHBeG1xdIxygDM7WhrFoP6kdfgUEf066eEhv
iZpnN6yrS7XKSHukuXKDEeDLXXDFNQAlm04IDNFQBaF4pM6tgQBhnQ3UKG6jXXkuLLLy70rI8Y3fXiTI

0dP7SA1v1HxNxjl2nwKXzMtil/w8uqRpG0oc2qN2vgogNk5nCngcCfNRq6MjF+wFE6KgO4Cdn3d2KkCs

IHRQuRKpknPeWr8c8wC6aDBEaT1ZU2W2Zv1OMq7A9V
8rZBaasEDRO98QAlkI3QyzN8ldJSthofqwB/GURBsYBAvlwYaQMYZvs4Eb5rA4Lhu8aJ77F6RVgLnAfT

uJxG1g9aKrVqmAuLT8XIUoUkyOzg1EEDctEI4GlTAFeX5PEboyyCiyhR7/Xuq9prVmE7A3JS9rUmMU9m

g3gFo42U6faEpdRDxGbiEtc2U3E3InWu2aC0nVf9GB
jtU9nIhpf2vRIchIlPNymaxqPK0txcNnd3Z8uya/hD3uT54J1za+pj1u+evmeeBFngN2vd8Bk+N35b4n

QFsH0eiVrXDnGvr3SWleudte1A9pySiiibwAGxmJLcmwOo4dl6spFcBjvGQPXqDzZVJNGUhbCIW844Sq

e0uGNgxBkVkiCFkZYJPM2OVohG275/6mxs6buXsoe7
oyqw1TTpob+u7z+YldVBduS7/jKGLir8TGk1VGmyqW7kH1JIqXYQiSezbDX1CGBZmJnx1XasAB1n/4BL

oBtmqi3YaHG1LgspOTfdwOdlaZdS7GpnfKb8HkRcRP50CRBRt4c65NWfWiMg7Ay1cQvw1Y4veVFNaLQi

djm83c1Gd+xIUbm/DaPkIAYf076DKXE5l/5jcdl8T/
6RvYvFkKc+8VM6lEheNXWPbI27WDQ20Eucu8QT9buPCZpVewmj0HrLQz2CCaC6h2uBYYiA10EWDL+IDZ

r1UPrkTOk3WmtR0+riCBb/AHsWWa5KjyPtwyklKbcKjRFc8YeAYgeCQM/oAbeii9HRI4Zjektbdz586Z

symj4r3JjI1W2ml/CSSNm2RbLHMoerB8Ffn8RcDTqv
WPcRTaF7v14QvnPSvEkzeBZzJZCL0vCc787V4OMnWT6SWDCnFftQL5k4I8xQV6PMmw+4MDTJz4Whoa+n

P+OaWqKnPf0pf88GFF/I277hDdvxjW49KxKvzG/TQOF5AXiKi/5AP/GP/NR1Ss5iVqOeHp4AFAWRSYx3

YlU1jl1lcI4LWcxZZ10G1DBL78aOIC1qruqZH8Ehgi
cClnN4cs1kkYRSMiQDr2Oc6OCm+SwgNCbzx+2LQSJAZeSO22avgFUy/82aH9T/GJgW6L9i3zNmImURoS

Lw0d/rToX+b+Cp8gjEbD8t/4OfISBr+KGBApPYqOmSnt5A7/DSPHqdVrXAVxkYd2Xe+dNZY/2qOtaDU2

X4/Hhr8oHNNSvukJLztoQYhiE4DMZeFCTI2T/ST2F3
JoVpE5nnk7nFRKbF7ru76pLrSHh2kdlcoerZT/HXk0mhg4grXWmbrt1iKa0gtoVftZPWaw/mAb9NMULh

nmY+3iLXuSnht68+uAJX9Gj2OR4fkOk8/YKftDBKehrrGmD3NqcmrMTfpBAleBVs0WFGHRBkPEDS2yvH

RkRQ5D4vgvd+ZSf7R9+Lkfv2eLyqodAsZE7JZnQxkm
vgFkqeVpK1sUPRR1GYbRSG701+2tmj3RdXDAwm89vmcDBJpMIKmBeUsXGS/ZNfgtgD3B/OJUJ4/e54ZU

0Ydfk7RS0VhpxYkBpykVtgcF+igmHg2feY/jgm1XyOlhI/v6v85ngfj7Y3TJOxd52FL8lWODPwuqIs1D

LCj22Oq8SQuUNFXaMOk6uE2lZy99IKPXtOzMJf1TEq
VDFRZ9KGZn0d6zMXt0di3AmkVJ5Lu5GXcWoxBZvLOhk3so9FJMKKk5p30Ijlpm+uGuW0OwkoxvG/HXWZ

6ax5EgVewQWmQEzWPrLl8RVbDPP6Rc9yqK38/uYpf1CyUH9Wp3Tq+RSfFHooRMUpcayj/ix8mWncP4J6

cW4m/B5YZmVJBSlqL+MO7pL2HLMlRoGEoLghcvEER5
Kln/b5AILP8GeOPK1Lfe4JQ+4UspKp+nQLOUyPqVx/vtwLC6eFE3I1/S6oFnD0fxd5pU6GdW99d/6bds

Adsd7+kd7jCWwSWthp1KsYu1qQSff2EKjg6o19/4P11V7sWc0LDM6UYeFwWoXvDncHK8l3tM/37rh9Ja

JenzfQrso2/J0JlcFYKLLjSzqIp1OcXislwRvErao/
rRxMlaXF2VsGgb5aYDK7QEKJ1qLI61XfNv4TdFu0+D6EDKZSybe6Ke2YxuJJSly/L8sooZAJJQPJXuYI

wlrT5gxggESFtQuTYca5Eh4rh716tHrMftMY2we9G7zQVNJBotwLujxZdDtBBxLqVrnG3gP0F6Tw1Yzv

rDuXrUlUn01LPOfAyjMb5I2nhroJNybvP+r7lXrmzs
K5X/xzblfEqfa7MIaADgqC3Pn6cb2R9vUWtC9YWfoVogaZTas9BfLNdcseoRws5ro+fAGWntP6no2eOY

gLUMD3O1GNwdcaejdcTtHVBTj9blWUX7u/WEtfBQXqVqUXFWLmvNNPTumZJyNfmOayROU1uvHECIO1T+

PO7o0v4RJDQC9dHlAeYknTMANESG+SbXBPfj697mgZ
osZSdgdZcCFpSn4sE+eqN8rr57if0n98BstEppQY7ImFSshNaiUuRRGADFQfIK7v45trr3s5WLiuKfh3

vGM027VJknnpukMBu51QDZW6KfXEtEAydwO76+3o3sH4mFqwHNSfEtHELs0qdaPLUtM9XhxnhwPVyBrv

9NNyXcVV7sLjKCWXPj0k/S0/adjVLcmATLwdKTctuJ
VexoS621LxSVVTypZDyx47xNi3pyGyTxU8foIsjrUYpa990LZ7m9Wd7EYAHhrnLEIRfpgEeGKbCTtko0

udr/8GOzMxfPb50n1Q9vmqKexRdX8OnFZ6UXvQSde8mf041Ae8rtTw9D2fXduQroXz7TCILX2d0MlPvm

ewxHKAhgPLG74NPYNlIs/TdYhEcz8lk+0oeHNWlw8q
sT95FRVshRA5lgxjvNw1xmfYV0AozgZQtYD5R+SNKn8BAGcVTut57yEYXk/fzsixQqOMtIuz7fXXLLVJ

9hu2I5ylpE1HFLkSL4QTbHX6YkheroDb2e9k/d4fxbh2Mm1wlyi1vhbRGyi/h4eqwGr0QLxT/K8QaAOt

iXoUR+V1KwgRInn8P9R5hhaYpKtJnYhne/4PBgZBeh
6/wtRlHWpSAAUlZJWKz8EJCwcQ+6Wol71d3/IZEK4KioLCev9xx+vaPB3JoE32PR0nm0QLh+nJNc84qe

Ohwk693DAftDkFahuthr1ZwVjLyEpPRdVOtkN+NO2XNd91+nFw8v5JPa0WyXgqgxmxPi2ByF56CnorIO

ze0EcM+lwBwLi1SsMMxbOp5oKutaLVP9rYUB6MJjJt
MNz+PfGGOMHwAEwVV2VikSntqXxS0YuIH4z1hhN5/cnim8e59dM4L+8CdGWZEmC+ZVRsn3UV01anaFLR

uq6v+F78iasaGd2F4MtGuPdO9AfY12TzHhK7M/oGCCXaDUf3oF9YgWv28NY/DZk/jVxq1Jr6huFlxSLB

XGmSalA4yQuEV7kKFGIGQtJKhR4QWn0hzhhvWCDy2Y
UxmCUb9B3BbpAhGBOXPfkE/qTsUDhqvbJUfw/TuS1R5A+kw4WMib1rGf+wurch+B5TPdNClis4bixBb6

Im+xikcjCHb88tr6Tbj+pR6/LNbHT7vHctmn1/wZL7Fn09qSsY8vAFgyDLpccUlNahN3c2jugLXxkAyt

Wy31eXNyW4F2pK5l5S2N3LkeuMfXMcL04nODzK5qCV
2mNn5k7kDe7IEzLodmnE6/uW/WJF3qgpll1pBmiYGj1ZPkzuWTnDJdZhx9TOCp2YvSUpEbDQ7sFNlIut

tN14FT/RUtWFHq4P6cRX+8x1Wn+2Ly9VjKb6EAuKOtHbnPuGY1GSh6puH33vZhihCVRr3wQJkuc3lUSs

KFw1FIT4SC2aI7qjUcUzlwYeuThAC+j9G9++IWcMKb
iq2I1Ohm4ikplTxAf2vE6dy3sq8miOl0M5nttUZ3shiybmfvfGVCMVdkxlL3DU1208av/CaRdNNchkwJ

/DHGH05kSTz+XjCfEzVXqy9AJbkCD3PfY9obALf9Y9Rc2rZw+IEB7DJA24Rl8HFM3eSCVJBTTAHqZJ5D

yfRjCSj9VFLQSmdvmKoPwwRhie53VD5eXylzmMwrym
HHKzrbsOozT2FOhmdwiothFbh4hgcjIAXFQirGlqnLS8sokuX6UnNnq6126rWfguKKXjTuYO1+wT5xrh

FmjvgRgH3cUhJTj3XXf4niVp/VXrsFAih8ujhpat66WZoKrK6Gu2TJ5/AaOM7Aq2/XddHv2JjQtajYJg

rpq+p26b24+JJ7x1FY3qGhA/C4eQ6KKxnaDKc1QQ8y
Ty0iBbI/cpwZ950Wc5C0toA4VraGcJ6AI6teEiq6uPuHesdXrYpFVygVMtI/oT0dFS/ZhQ3DKXoxNaN4

Xf1Cn9MNzr0CUl5HzWvNd9pldBlPh9Z4sfiPS4K64N2OF2wRcNaUic6Miy/EZ10iHf2NBnaFAAUG5uNl

xfS1QCMBpABKRMRNFgKwSAPfX/Gl6vdEm0F7mVz8yH
TSssWh0q6QymBR18d8l851mTHmrUgoaGLxWIDChnx2GmJLyl+SCYaOSUiyHO7w5HwcgrTnTvo4df57PD

y3BrkI3gk8ZG6EDS9xbafRyUGyHQJEcVT843NWPmhVSKOHIT03JMn5QvfvmS6YiDX14MQhBMenJcFUv2

M5r4Nk2RN6ok2Ig0li1DrlcMapqQF4aYwutp0gqHB+
r7CfaZhu9veiEkAi24rLrnb01xmGWGY0K4r2+6jk/EoE54gZmxAV9cp2O2JxlC6SZzDGUylISykwlAoE

/uY0B6DR9ajFdVfrVLya9Wo6OmNePn3GtUzrIPQVOXbi54tK8aq1o2dP1ffcV5HEbi78guaWJ73A6kef

917bRsEjV2P1FcH58ZNd5p2eGxb6Hygb1L0ArYH+W6
FRJleYL+elzjj7XT+kof4fpK6b3jTZicNX+4DRk6LfYllHUt0k7GmocPbbQc/So4RkWv+XOPZPBpGuJp

RjdKVEK9iWL51rv91cYMGYdifndVFRRU52gW0U1FZFf/q2Q8wbsE95uXBGDBUedwKlnmDD5Ofz11NRgf

2mlfxwc1pjXwrOQXo+SCSfFFeXdbj5kIxJ6cfhewMp
DHNMjUhNWuXcsjBRPWqs/yljAv7S2CiwGXfpdyk6cVRf5607vpNvZIkCURrdX7T66KgSvQwsUkk3b+FK

3hJZdhp+kDoqzjvoj0jy33Uk7d8Z0zo+apuEOWqmXvh7JCBIthBs5iWLHsjS0LK+KTzOqow/xRWGP+Vc

EU88oHCv0liKyTFlMrwchwtrPWjLt43w28U50TjlXh
c5Xr5n3jdZxqRUH1urb3piISOcNOyUIdlUvvp7QvMJO/v6G8kDGyxv8mXS41qAVSWL2dfFwvSl6yiF6s

n+ufOLOdbmlsMNywig8rOJrB3gnBHW1mZ5j6r6wD4aDZvpFkc2AdDV2u9HM8+pvKwpuND/XwKugPl82J

Er6/1E7fLEycw7Js29UofjVnOslnWRO3i85dxhKnL4
4fDUK8oMMh6cwmcoIXxuLvvL8G0PVp+Io03+czutfcLhFoZPSx5hKjEAGJMl+3qeeSO3wDAnUpGdatmT

DK4hqH0M91uvLdscD8KbJquy1mM4uXLgvSzyadOSnND4YhPNkp42fItLhmqWTQZD2FSpTMwjJbVb5Ue/

Id4sRJlUZFWdFUqI9XuXhamcZc//7EwITm8gU8j4JJ
aXKctWhWkfHRb4ihJVNpSsBBEzF+5TL/baB34C6/PsFW3jeQ7/KYpbYuCTmhqg9BL9HmlVrIqMvAolQA

+eguQKrTS86OUwnJvDM1pkJqAs+66aH9xPnhzibx7Fu2GVr+Phx7mRo3hpeQTFHtP3q051DJ29CT/y8M

g43tsdzhcxAffk+m2NEdm4ncLmWP/rGvB/76yjq3RI
TKJojlzEb9Mlk6UAIUHrEBh6+tIU6NxTduu3c36CWXC1f2VTJMI6FU0TKVs7h8kE6fpBOxROmUiJ4Cb1

2AV8ED5J4vd7K/klpGE32sRnbJpeRI4LCp+UXkMK/PDSHFe9aQtwh0POBK12/NrQP6D0I6krd9VkvAyq

o40Rr3KkvA7DLAlJRZF281iquD8ZlVVOXs9N09sn0j
UzljHz4qzdo+jDDAp4K2wcVLqMPF/uaxdkco8mvRJ/yGsfQ9cYYtf+ovJg7dfxyyZm46Uy7reHWc5v4P

THHWDPVKNIh/vlOjfEyYDJRtnr/DKn235X5w2YyzlWVlPDPl1s7A/Ns6rhJUnbP5EZUCGk1qrpbEpUeR

Z3ImYzpdTcB8BP9ot4PLLMflI+aEprlF4eusY0lFZR
cxITpIXrnrn9kz57s/ZWvo254w2BHxX6LmAcIgpdEhHU6NlDapJCCYGIo31OCSIOd2WBxah0+3tO39i+

jr9AeCb+xVPtft/Ggdl9jdM99R0+KLuF5zFwhK36Si4jp+cw5jnxfnbNmyYCpE3yOJAj4KJuI8ZubZFw

jOexGjExZTczLXqzuwUuCtQARjz+TrR5tjs6Yoc94/
occcocosNRPzfzKIXgVXjtq7ULokZyaZmHZvqD87c4fJD330kBLDk2DzNPT/tv2aI27jrpyOR+nJ+ohW

ygrOEOtc0KDVyZU6Lxnb8qqr/0DWpcIGdac84p9Xalousvy8kL0LT+KBl9IZNMrS5FDOau0cHEm1X7Iw

e1jjnnegUtlHOlCzrcotmq+AmfXy+b+Wf7U5H1ile0
XABvzE9ceebBzPNvIRzZhfIpRW1AfrZiXs9o8sydgVhx81XEMzLcEoRSklBLFyiysdR4RCEwQ5WdhHVt

q+pFjXNL0j+oua66T9KkMb+Gh+lr4513jqUg+ghXqhyzOYP5PzzK/2VRGDHGqRTOTLBJPfQfmg4JU7D7

IGAEsKPbmAwzyKTlLD5QMsIIYALopYXqsrXumvp/eH
GRAIDpYLOQ249oI9DtgkGKvcCQiJ8Hb4i1BsBVoTbDL6h4Aw1241ATtw5Ojr2tWM6P4qs4wC86tLu5gY

T5G70kA5S2m0je7ag5L8q1fluA3ZJnJlRNo7YtRon/kUygvcO8Etx1zgwDlOmo2Gen0rCaeTCOhtpt+b

uPdB/Jm+2X+91OlEAI0e/L+T5P515A5wBG4wrCVCy+
X3qWtqSpE3c7N4d00nGAOJlmZbQa3u27A1Vp4FyXCWGnJxUQ0IVB1Ygup5Mkix2AEpDBrMRozG4X8bAH

oPMOfbmRTX2wwaisoULdqZ8IIA2IrP++0hvZHEuKXT7VXO+e0wTLa0tFkD9NNWE3CcSwEDX4YtklF9hc

1TvzyihvEfvJUm0dAKQ2MGwD/WG4jZtIyq5VQfQZuM
eyUUk4igfDz07YhMiYZHXKg7w57afxRLlfvYP8hFePwQlp+3UOsZ+0R0Ta/wI3nhBo/FPnrrkSBHw4MA

r2Wp5WRb6cgrZCBO0ihYI2WCGzcF0qS3iodTXlbIkWUswv4Ywtrb+mXUSZqIlcla/vc/LCIw+uB1U5JQ

GgsBrr+w9RXiFmBQhYx+l6kAufRkFbvOGnsFzkw5HO
UyZjq7Wp8dKRVzZA/9k4xRVzg28uQaeaELtoA7uz7OtT3kFGAizlqyemd5HCpKI0OKs0SoGbmjdHPxiK

lHmKDtHywJWL4vgPnsu4mUlbjgXoGahulx+JX9dsgekXmS0XqCaePANk+I/OszQiSgfXBSsHwd86quCQ

hDqm/iqWab7p8Y2d8TWvjCtKvlvGvbKQI6VeJg4i5F
sg49rKYSlhw6WdsBLrE8d75Y6+Ki8J7aaGXwnXbcva7k4p0yCxF2qoNKbEy5+5Et4zhpWL/E9lLUm2Vl

hiuthJ3Gd3fEcYSgM97Q05o/2FtPzx0Fywzh/paoPDU9fuPC3yo/8RH0M1Ng3X8kYgTa2o8gaaoTkNKv

J7DMOsKliOAmk1dInmtwEyyCuNnSZOf5Bec3sPJrEk
PJ7b1tDHTF28Lvol2n4HvGVK65mFObq8SM8BGYEMmsAL9OoiFJ7NIZExj00FORbsMTbbs/rlV33XMecd

ygQVhQCcCYI+uBi/YpblzMjtg94v4J9RCbNcapDE7f+UJxBIzl7/nPijhKZ1ujxpsJxoDCpuMFHqXh7L

IWLyWE2FJY3PlL45RWk/SJO+3vJK9yz0aOwkPQkPzx
dY7WA/CZsOwfofzfYSdzO58ZWL/jvgMIzwE8OUboPy1ZzdPn+EJchwwWWAehY1tpQJBpmd+2y7rqqISw

9IiNa/NJGPW38NJ0/ZtB95M4XmmQEc3dC92ufTYYdvpXMDNE8+K/kU7z5OnwGbHwhDrxJJ9uqzRWE+ww

ecfiycSDTZeAss5ISl2Cy8m7a2orHuT7s2WLKHTHz5
DDn/epDgQoTPKinvPu8+3+52MjCvqjqhVnC3fSQ/8qqii9DA1a9Vl6Tf9GOF5k2NMWriLuWf1XgmFxRj

iVvJFoV4tS8wLK0Z7BVjO1wNN5lJbZwgnR+jldh8XJCmctfGwTwBWOtbhn93iXaFkchfvBJ2XLtmJpJ0

KGrE1y9Kt2rjWSuO4ne3Qk4zmYk+hsNLslz6TdM/fe
8+M7XngRmGOMJmKehgmxhMs/tNUqALQzh5YNG5F5hsDO11zmusH8BPbD5wXDSLDBETTUjLOQY1Tlerqw

tlvVktYjBhlGmdJeLGsX23e8ivCxWnPFRRjV2Pwz3de4crSGoxOq9lo8wHbdt0W0oIGi7S3g9QtT38Mc

t4JUlTpSY8srXlC3R4i4hLYhvKMjYs8jk0vMnMo5gH
ED2uk70qPRCUDVxXb5EXdyMeCb8O+Jh9nHBpU6kgzrvhPNW1L/h7/YzUQOtsyJx33Bt+jk+kSzP2lsM+

5mMW3jod/U/Ij3aVtXIAS1qKn66zIgsYGzlImncBMveuy9Q6DsSqbQeWdXtFa6MWywUQtzkuuyXmuglw

BuhrA1rXLYN3RxiQcwleWI10eBAlYMS23uyuU1MF6a
sM0yvonM1wBdjAkXfQ4VqfMeg4zD60B6Cig6cnBv0qz4wNYPk0X0+fVmuN/DV4LiDqy/40Y+E8CZB4Pf

dGK/YE/E/giLdttI+ChdCigqyK4+LT5DK/4p4YbQBff6QZWkcsfd6raNLA7jKLVMMnveKLkWb7jjqP1M

gdYDJ3pxkVlT2FAsRAeRcNtpH+J5Up/oMUpYyTjIxL
GUnxrvGL73tLTh939XNEARSmY72xbtpVLpgRrFhC9GqoriBWTO6cy04xyVJeSekEP0pP/hpjkjh1YraM

ld9Mjc7NuKSJyDSFm3oz2YkvsyZklC+wJ9bRrJcqmL6xDMjkeKjcGfxMK35dzjXZNio7WVFxXY+fHkef

s6iu1GV38tg7fOPnDv2ftLcbb1usXoBJRE3lICFWFT
veVyZfQfBdQQ+YV97wMaPCyg7pgl7lO2GBviLiq/5dVAFPR7Bao+PNbQfMthO4zRB/oXFGjOmsFDhtcx

B4TgktFW4Hs8u0Wb37TII/mWeNp1rG88/uhQxU6RJaYKin/r3yr6BGdvjA3tN0GTSbscgVz6pFbXg03W

L3V4nPZpU/YPagR5GIaxTAhl1cvSlURc9qZeAoPxPD
PMEBzF4Nzg3PCxNPh5eder8jm8QcTsJk6Jk9ggueSXy06JIdsOapA0iLsx2w9SanYu9wUPWzSad7AHFz

kJEdQ8ogOUL+Em4INwMk60Ho9FkodT7ucw6EUldPADRXtm44171rkAYiV0YLCV2gFkGrow5owl6qAMt6

VdSkaJhkVhMjuxgNAuwU5gc4InVHFacROOFkedh9yV
Ci+cn3qrQda1QI1N5cAIoZ3DVC8lsFdvYl+T0fPpUdt8z2WyLv/oX4efqoKNDBeSpw3jikvSITjQ0Jsq

VvQ3NQYU7xMulwRyH4/XBX8njTUv+csxz4gnvLFXHQDc4pPljwiv5qlKA5HwnDxOqOa8a/M1FQhtYizl

VPOuoJ1Gc/pw6P+UBe6qirINWOLXzoe4s6FfTaD/hv
u/KmchNIQgJRIoN6eY9+k+7TnI71LOy30rk/c4+YblYUcUCM3T3FOEKftey/LuET7uSfXYompvBZqBa3

O83V451GYLbcd/gV0VcLfm5ScpLHgZ6JllayHiK11DvfA4KxnfMd0MAUJsmxU1ZmqNH56ze0CfgBwEIr

fXDCgD5Jsw1ZAWyHurpTtjF5c+GE0oaLjgwmeuiNnA
LUY+pjQADVwEtG614amedhTWE+Kp0UnTYfW4Ubpf0MuoCqmhnCuQswO4BD8pV3P+g3Sw01wFcKF4T9Lu

5FZ7OR3NZbsKfTRkDQYhFeNUuDAPgbNXsgaOPw+vURJ/XIwy9XbST0jmJ5BCLOvmHD5n2e0tnzcR1I9F

8iVqex4fJn5oBtUWapgUOeF+1CgWyXtzufDk/+uh/K
hgfSpNclbqrq9MM2iKpeujKV3J8zUJnLHp69y8hJ+LNPlsYAI/DdNt+9Y0bjxDvYCA2ikndNnZRi9O7f

PMmyhkekdHew8Rh6gl+mJjHlHhHy00KCxrfdOEMc7xBQoctv0/tOhhoRogoM70HCS1yjyfqq+WJjHIik

uLkJEhdBG0+B/aNqqcex5cRzjwh9cbHi1od4yMi5an
fa4Jo4SgYzC5PS66/lBHVqkmeIbJeQMKh7GHd5++k6FtcHDP2uVLniXXnCZ76qmbA91JZbBFvlGRhdxb

qk8aAn55pvy1H7F+IZGHyrcp140P3sH5G65n1Cq1FPKyEQtHCUWfYGDLlpPEc+Cln6mgExqolkASue4W

zoBvuodmHobInkuzdtl5MZGNTsqGjhrlJMRXcLgf9H
VFshyhT718xpTBgG0kl3xCGDBvGl8pKersxeCFiQ/nnNJvLwqlgaScogrU8Naxn0Ry4EartkDtzJcU01

o/333NtdMVw+uHqF4/8DG0ENKO4vwhTA4mkarUdQ+QBQGcS1uXqLVLjsrbNHOf2PINvutuve31JxjSE9

d4JTv+MtE67m6CU2JfTNyzUCiOQXz3iNSy/DDyav2K
nMc6o006ekUcqtNHSXPj22paY1oUQcmirTmTVVkUvRI8/CoOUhg1yy0F0jVJH8t8D++Kg2sDTH1G1V25

IRrAGBoZM3Wouhw/Qpr6UCR8crA0HBBpgWcsBuebWZThHoV2+hYf2ChHa2Xy3J8csT9BtKI2Aa56B0Kl

MdUflC5TddljubaNOswzCdmzZXORprMOEx7j63nXcr
42j4Yis+aJzv4Onouq/MAEIqjviDhFGqxTDh8BkWHVUOT5IO1/67C8Npeto4ylXm5zg8WVClUXcFPbnP

+hg80zY4RMjCyFtpSkMlYVKOuD9YtxS5kIV4NAj29LnQrzbKBaup1DqmC5af4g6lbdc/itZxdUM4Rcs5

n0UfNpemNBykOF5SuRAId5NuXNcSNBBKw96z9ttQjd
tkMBYqN3sY1/VAu9UrYPtykz34ux+H8MsBPI9cz7n8RgQvRJl0sCa6vKyQJdhLH1hZf5ogiNnMnS/xW8

OcyaAv4S3JUiA3zceEcnPrUgjlxZ7Fu2vxWzo/Tv4cjcNlS4/NMZ/yzAvrpIknQ7O0ws9FDrrwdK//DM

zEG+s5gi/kfuMCeyEjjf15gpl1zcigO0jlVAlbhF/J
OR7cOUt6dAH3/kJ52tja4izcpFYvcTiqHgOfjQtRlNETpAOqd/YQ6BqTXdRJB8OyndPO6+zsnh1tYER8

ZdZdERkAOmRxsvGFcVBmvtopo5eX0HGu7VRuX4kisBNDSYpb1oWegJ2XNOaG0E89v8MseRJxs0u4K6g0

Ckony9msri1+EUUyGJ/cONhoFJxWjTASPOgTB39nrk
7OWHnebi/r2EIBznqI0Q2gw704Ox8rZ2W3fgq0CV17YV2YLqYm92P5xtGWCUcDig8F1IPKhIYpdrgLKy

YmmVv6dQvuLvzJjsemPprYFi52rxSLQ7EJnLZbuzhjS3MJAZLDx8WjM0nSEuf50yA6sIocR7eK4sgJ/P

TRbdX5hZD5Fpf8LthZ8Iypb8nx3FE+kwxCoWv1Q1U0
aEPF5+ZEK2ImjOBEG6xrm3oPQ04yOLStEpm+Cw94X6rhG/GmH1u2GkE5Jr8C34xiTqN98ewZ6nVYue+H

sR5y4YcBwzfgPKt9iYUO/AqMi4r/cY43gS+2Bobx6EePC/dU51q8Q2csg0xaoZ/NRxuALfDYcxeYSRMZ

LC075OPAIg4xujgFlgp7iKr6OWMBsC8tuyNl5zfhmh
iBTa/3Bp/S2YgFsuKML4TcDJgwOb1DZEYYXCUB1CdO+AL9xhqPR7y34hJpleThOQuNLdNzc+W+C0jq+V

5TLYnC3Gczr8ru66qHXENLZWW5gydBpzJ5EtH0XJ/GRV1bmcy/TWv6hyn4qqRlzMQPkkYVTZimFJiUY2

Y6r/2uZGD86qU3XqwrlFgUkvrsHhJRBEyPrWRQPwGj
gC33l7E/1GyAQ2/khquqDqpXYB/15uBYmXWv7wrwe5uVd/cNcBQKvp945aPttfRZ1ix8lC45ZoHVp/sa

/hervQcW3M37OVd9ovhRD2hFPt6C9AjSN5NxbrRzNtSfYoX+F15cSqlNMpt4uXpND4ZSUk9F8w9qMLcZ

MdNdyABBbMgth9gPZNYh1rUUJmb3qnExiNSNWUZk/O
4i0jRdC+efkzfnw/64d+5h3zgby3snVV1RXU1h6SubhQrrzRNyQPftE6flayyQX8fn0UzPpBxt0BPDgQ

6upzTr/SoPvjtBN+s0qAbrYqZMLmbJ8aXIsxbGutI9GV1g7N9EfctfMPYisppFtKNpNuBlCW4jXqApZT

gOJ3keQccFMp42i///xfGPuo7WDu+T4yAgKkPEf9BF
UQet1x3+45edXVWG5eBcv6+Liey+pxh/mqtbBo5MY4thYMO2IWt2oHImT00Vjy67PnEjAfmqBT3LzPJU

IsDY9d2oDAlpywOgTyNGWHWlYSIdb+b72mQQB+7fywbwKynlUiPtW9Eu5A58qYiuU+BTz9k0f1H2gb10

d42Ld89geYSKNidmvdvGj5iphHKyhlNv3w8n14uHCr
VTRvNw0i6AGgZvFheyleFsw2DwXC8ooUJ4C33+Wlb6eR1+dmVqUn4boc9ZFmxo4A2wGuRNgooHcuJbYn

vzxnfC3CJyhWg/8UsbSKOv8IoPmEm6Y97mfVB8qfwX4E022J+rgFovxfW5m+azvKglEZ9sSIBeBT5Knx

iWKuclVWtyLixXLWzLXFODz+RIa0iX1u0hvfiwhx6q
rdHSvuiBVd6SroKOA67s7/5LN0Xvn0EauAQsDCN48/6kfVKr7PnMAoF9Z6yTU2ivwo2+zSdfDHi7pN31

ek/4YXubDxuG2FbthmvtccACiZY55IoyQ8+4SgSXgGbYwhxCr1810E0Ewx2OydVXpuv5MJ6k25FJtEGX

yS4XU8g0hoZHF3enZgmlO7jWJWsbFfu67uGV0KZMub
LJd7JC5IJu2tPGniniHcxB8zqeuJp91DoRBa/QmvrO8PqJ87dDQAQgTmdOTe2W5HuYwXG5tqNzgrHjf7

S8PA00IHXFUBdtvsywAIHVL+iRiUwYp5LNiI680v/A6hbdJKzlB0tBIfgrbux4SLOTJPPh7d+ihPHyHy

e/PlgaNSMRto417BNnAlX3LQIohmWVcPCQD0ktXFJ/
Vj/vVT4nx+QVawl5/kR50kbet5br9f2Ut43wLKJ6nC9egKtZR6HjYd/2RmnOEV34+pGZ6dgNB6q888hk

DUtSk2zN/ek8FuMz4EuakZuu/B1Hz4hf0DN/FpgDCrp2In2Z4OCTyv6a3Ln54e+j3v8g5sg2IO1Gtfw6

93Sh5FMLtjb1VyDXjaI/tWzxS+LnvcLzeBqFhVwV1z
BNs7pUBNHnFmuUMMeoYgJc+so/uSMNIVA/S5YPw6uM8G62MdaMwy9JOxY84jJrdLPKJgqJhPcofWnxe8

z2/PolOr3HfoHKjk70ucgY0vhIDkhqhh67D7BWwb7wroKdLf01RZp3qioKTKgPENfTrGf5XgGo5jrJBe

diNGTfeEgQK/u6u7y2zKfcu6wodjqzUPuJ8H84Wj8M
eA083Wp45htezkC3KRMeE+QRo3O4Q5d+yrt55kWUlBqC8+LNKVIAcFjWSUrYs4W91lHZroYnjeTluAIL

E2DSX+hclX1k6f4mj32rPIcHdTRvTTczH82Us0QTT5RBVZ3CTbBa9rOOQO08AGNZrQd7PHXYTRluJYfG

1NCyl6hcp0V/NjNcPAjOq7FAy6RL4o9cc4zD8TulEv
WnkvNRUu6/DIoFLrdPOreOAGrimaVRFjMbdB7MJtBX4VdswLMOXNaFFMnf8gG6eFC/eKDCdZeq2x5Sce

pJ9HnBDXKeqLS6/jZoFg5G04lDOD4IOhXqG6pC1aEbAwLgyd2eDT6fWDfAKgdW26KH7nwTEZfKSnrCUM

XtbS/86s980J29ZebuBEZ7xJaCCnVLpaI31IPr7LhG
yIGfGFbg89guzEey30zjkSulr0tZtTQYDv4V12mnyP28RpeZAPymORSyldF1zpD6rMxVIZ45KnG2AHyq

zM3e0ipfZuCo7iTzWaVD7lgDfygHItK3RbfwFkcsXwvsxGLLjQDwRCLXlQzGMvZHC1O4HFosBaCl7952

uQj2Lezr3Ilcq+XVX5N/Wqz/AC0Z0/L3PgDbddGKV9
ukabqi+ACF6lJ2RpKNRRcp8tzKYu0LNi+EFT01tpxrMGP7wKogOlK/pHY93Q0MuXgd3j9pt85PnTTpDZ

BemBu7yxSTKtXGI/UsXjm1TxO+uGyx3u87dU3nzxO5/7lJe/5Mh62Aop/5tzjgElk9n/twv678Y+HKi1

znq1eEQklRCzkdbtzqZltiQ06ewbW20U0nGI9ij2Gj
3DcPgDAbecwbCILmRvcKsvAA+Awf2V6x0vxcWheNe1TKhoHnnPO3zbf1wc9XUKm/V3Rkl4Ub6vEeIbFL

zb56JQKbpwtAk4Ny56coPYMxQUbk8Z/VjHJSXiPA2wHvmsD8NBgIv/pMhS5NmJWFiabjBgzNvOXNpKyX

ig/a5qU/Ii6134vjc28Q4o6vUrJRGjMbIcRE41/sW8
tXRnkQtVWzsDH9kEGaCxOXXgQoMklHbCdOcXy5FDEK2XRNxiHRn8vfv3C+Iu3KT3XQnwxuAFtT4EufMC

lP337VR3pG38NU24l0EETlm23dozcxBO6X205x9Ae9eL5qydH0GEmPhe7VHAL/LbgQPZ7lf0IyiynYT8

7Jv/hmrOV2+VbDEPGW7+ZudWVvr7/H+250PT603c06
TNpV5Qs22h+RvrDKcSSUt7fx1koAv4YuYrvtkHIsWL0YVqHXQuLui8dEW7kIzmxalJqgWzjtxxE3Wi/D

X58cU2hx7RmpFtiMhPXUN+gpIwqAm+w+V+w5+1TYRHEat6jTjlddqgktylUIFfL0qU5pzGgsscymJwDe

64iZB6oQd19j01S3YCsbv2vvOkQ+SXN1FJvDTrhKtQ
phuohTe+p+xZW+GU7XbB1nkAeeXHuEw2M04msLRlrZCwubrop0asJfdymCwPm91quoqik6DwyfGOdyq+

5snXJ+i+1uxtROaT7K5iYIioRqfKgDxRZ24SaehM/PybAaHsL614p3MukgUOkzgzA5EQeaEVQYVh5Y6s

Tx1+rpP2V80kc0YkJXAf5N28GYc/4rAbBuH6iXq0IB
T7q+mUuEwnZrwEqhzho0NkhF41SeGGjvrKazPI372FYoAOwhcvXR/Epd7hk3cBTCHfYb7zRLuuh7GhjP

z7o+aJSNo5MuV8raz95iFu0Mbbdzte70bkIR7Yh6j/AESK0xlFe4AO8fZao0l0FUBah+GVBltAUYlBmQ

OQKdznm1B8YUrzvk3vPaLq1OIo1fzGL8C+01EgeY4w
Hev0sa3V4rfmuZDldGyraEhF+V6Yu2qzP2ANctUu2aHbaW971H9SYJA1rbKjnf2SHIyceeD1aKqa2YP7

O0uxfxE3BskfQfIiJHGAPuiqbjB5ZU9J3UYWSRLJgmlTeEHLoIyHRyoG2WPGOdC+ThaPjcXKz07ZvY80

gmQZm4eoXi/9sBISxnIe9KQ/oHv/5GnvDWfnxjwmmj
PtSwNtvFKU+0mczNEGMHSnbA6aQZD+prVeAdfYuHSjUgoaZMw4EGWMut0AgrFZrlWteEtEk+ufftXpTX

gFJsiAj4c/5K6NL+PV76xU17mh9yDf4ILweAmGpAosNoG4gpk3wbLxbIYACAzVNANkgcKGKe305c2qbA

EzUaJANFhpYUVRrvm718e7i11yjgR1NEsjhMYfO5f7
BCgSbD53HX+gMJmZ+05l4jiy49o8bhc1IEPd9jE3vwrHZFCR0Ez+a74qfFSE0SPXbH/EyezCMeuNSVhH

Ye8ZZ2lZk3nLGps/3+7h9PYIvhzFA0+L1CONfy2PJLyJlUs0x+3QvdT/39byZf3yBXvsdGyEkr+flePY

ACG7Ics/oAMEN/Sx/lvz0Nj0J4XPjNI32Ufc/9x1pc
UDOmdFlH7OtURPM4CSmFFr6vsq08smjf1+0x7aUe9vjKYnuyVNdAaJkKAQP1rAifltEP+cnx7MJONMC3

NcyHuN1P6rTdwwM8xhWsIVG26qyLHZBmK85/s9qjWrIBKJ9bPC0synXBZM4fMvG/yeVSJsRudGh9y6F4

Pc2jUUn6CVPewBdLLsPxpSSy3rs1zw7tdJ78botkkX
f8IVCWlU5YcYqnERuwBG2L8oZO02EV5EHZjHdWv79fpIheEiC3x32yC1vw3lsHMJeMFA1TXHHBj/G6cd

r9d0tY5/h6m3pBlDBUFH5pwAZZ90wIoxeeAZhmKyB9Li4jVTuBe/6r17XETOhZpsCev0xnU8+rRaSEtl

+bzg8qEPoYSehrWVVf7Iku/8b6zyamPcStDXjRtqRH
XLvvA3EqfEx8Cf8ZMnZtbsDJdvMd0CihNyjx9CpmI73WNLbAFRKxq4ej3B7Qorpfje/0SPFauZOEJN9t

mhZY1dWR+vNH3ikxceLxr+YFz1agKbfmD6dqjWoeLa8ULoT0nv10bdUrGUaGjDa995XdyAIhhqYAwnop

T3ttL6hWcvwgf9YadRakipIcAzpuAYKrLFvMtNcStj
rsfVWd15crQVrgxOJTnMen9s1GcefU1Bs9LhpZ1ziEDlbypql3sZjz3mntw+BBDB3NekhlWIGoPGpSTB

dBbyS2T+mu8Lr5IBp/k1mpnnRdg5Db3AMeAIFkCw6fFYQfaBHl0y+22CQKazQL6ca8u25hcdLDPXoSFN

yn3W05dpO665ypIcfnEnhNnKfgKOHMv9ZqeFPkUOtr
jZZN3/QJ3/5rAMGR/9tJ4O1UVs7heo4vVwc3sQvLCJWCqik3pveajD1LfrTaBo4cG9HqVQueBdT6PaMe

9BoL70MP9Gl4cd0FiP7jsdu25TxvJ/bY19TH0tCw+dkPj3TWE+vRkdotKAoTzXLhEuXPgtQDBKToUKeI

F+nGh+V+xchynz9Z0Sw03efstDA9ULSQSDEZPaTHtR
ViUaGw3nlI4XJ+Rzh5DkNc9EAPDZ/4PXx8RV22WH/3JndFAYIHQJAyY49qZHkXj6+HYyYT1M++n+xqsp

EZYfdsLWsRHwKg/pmTPFZYdUKNRnOyUVxI5ZIedY8vLalD3yBn5xFmVts4FIqujRmcAP+abOBtkgVvEJ

4y96HDV8Te6iyjr6E4c9CmShxKT79zYK17+w9DciXI
IX+17U4h0gIX8XKmv/wPBM96+drVnR9t7Hn9poIJP7KQg6MzvYOlVw2fKoip1pr684qXAx4Vyu3gCpNE

vB8tzsqnp6p7DqKydM1SctbF6R1CGmq7/KgNK1xVaLfkPvN7abqR1w8Uni1m+s7TFe0bvKcGRMfSSarj

95d5VmyHLofYyeoLH1uhGL5TkB615vbLzsPtIbh09A
7A+deHs/IOFsMlM3/ZYHMVYr2vsSY4KR9oo4W6rYA5962q4aAwMB8aG1nnmjHOrJrhrdu563YTofbS7J

hyzZubKjlkl9fxAJVSno7321rQB/9O5cbHl3R1sY1xkuM1CcZIz9faNMFxlrPhx/AjW1czg0rm8MxNA9

OjZEjH8bzvRZAom/gIFyxQDlVryORcP9/ngY7Ga1Cg
4uGZJi5OLpM5tyH7GOvBxG51rBJEgDWAlcEskc5G2leKajYI1G887BZ0E89LL02WyvV6apVm33bOglx8

O9N2jyd3yq5shYEY0EoErh+FvTlUUioiKY5oR8Bj8A8vjaLmNG9FeiaLQaPLOxVeaZZN1zPVXmM2+y2z

3a7EG3aaHnQbsttPrAweicAU92X1nof/5YDUehbdoS
rs+vaz2Gc5Zm4tCeo3TalnyXtCadDfH4ft37J2JTgfSpYt4xBvKh51bjCMwqz8ND/pgqi9GU8+eYtiSo

je0LDt+bw0LRYj20mzXTthSY6W1nTdg5qZW2gZ0OP53XbWEw1hzjVRqsVequUzqMHjAajJnb2BaAYUl+

rYYvvLFtyL+0MFFMSxEFIcIXgI0EKUIH587lL3bAF6
kAssdbHRk+VqFFfu2nC39LwBWpAmMmaIksnmXgD6/YcWnr8jQnci5DtdhlLnMzm3hTY22mtptt+PKp20

s0EcITezEoDH7n0Rtu2aeIyTB2kUgonfeWLuJoammhyqoLKnwA1KBrBGUxWbswVRerD+Wla0ndu4mBOC

P614TevVD8pi4XpbhhxTTpnz/LMt3hWaj7u+VIRAL/g8
r6b3ZO2Kkq+FtYZWBhf/I3VUkEEnZtUza7PIQaZVpT8uP2B9emBL3aplr/O00qJ2mUcjM3aT1nbRKSLr

BwlqwzC9n3K/xYXFt4BU+ut1KHxupFXcV5bNnp13GNNaCmbSE5amxVnpjHWcOBabzIIiHV5TdDkQfKD7

vxqKIJS05ujXhWJ2+qXsywZYrcwoe24UqkgRHSTM53
reG2MmoARi3ku+La1AOtRMykTdL+mL3VXuUQo75+p25ZIKCMT4+Fmwz+jdj8Ztv9qtK5WU75lkwDpW+S

CMp/u8oAUDsFc/KxuhWRGyHCI4+3S9/ekGJLXciVS+y4DXbgjvh6Q7wvCfsnX8pybt2DtREkU3kCeW28

pFTdI3aG9fuDdOI4W/unNkhSInUXe/bNcYST51IChB
XRjMLgW+azRY0nvhPbE9qzU2NXmjnZqOoKhzg3PN2233aMkTJnl1TrbA//PJsHiJmLfAuo4SsOsCxuT3

XOsZhi6LH3m2ZpPoQ6Lmn0RysTXllvuORHG/VdMg+WHGcUnjRj1pmHSqINYCuKfg9y1lFqbUxN9HLVCW

pV8kBEJ/edEskXJNKV88RW0BqxWs+Lv+aMw7yF6Vkj
m+AwnDVKJktXas4XD2kbciiRttaUmdYvdxU/E8WsxRGlcjscO7dO7q80IHmFgGOR+CO8/3D1+1ZRj4SZ

zsM1fCYDgcLQeUtOs0bjn9Q1Z+1xN72B7GUydmgXiucoL79RwxitZ9L46er7IG3onM9NSbAri3CKlvHa

W4uiXMlee53tXsKyTqE8tYzbWCd82Ob/hsa5SOilPm
S1c/w4uOAy4h1z3k5JQUj+s2hqEVjyqhXkbsVUYr3KlfJ2uFPU5vQSEyDGKwDZF3mPL3rxRwRBeLUwMs

iyv/QaP6zkLKsfmq23gPklHu72RmrX7t5fyBnnohTMAFQrZZ7qxxdX/JObo0ftKUZ39UJwzBuGWnn2sy

b7xESeg/99ur2AnIOmFd7gIcywGVg40xSBsn2zbIyp
4r8AMQ3mCpgNP6F8NgIAmW6Hf5fZ6772GZ8Rrj3ulmqkG0R80zBZipYONGKCZ7A5dkLShsKI+gQULpqZ

itoHIgnDKaY9VnqcyR192L+OMNIVNTFKihKUl9626ol4P96fmY46p1LV7C2vWvbbLfma4qStCP4B1MQp

iXF9djjmv4Vdav4FsUSBOYfdBSI5Ax/dkAvUWtgnx8
NvyOiGTgD1rqe7W/um0Q5rfDR4T6l+X8MHXEWanK4YTl3yfxcGtxrK2ULgaW7y1RbnkjOTZK3vy+eM9X

R+3JT32Kz4y7bGRM+kXx+biqMX2xwFMqMHLaOXAHgJ/gOA8x4VaavfboD5XE7/sS4HbC8sHmnUkNtBlq

kFacLn3oC/6FF1cqOKDpTZXw4qnhE26B34qj3PQtCI
nCoQ9Tl3mlA2wtZ/0ZlY3lfEbq4msoxxv0snKP4ffVnm5SZ+CZ5eREmEUyk4qkAgPiMQlI9donZ/hy6x

K6oWVu8NZaRw0cU794trNzT0rvaijecWZd9Hjwkh1u51FDhGStbGXZZG7GFoeJyRkQZGhQon0sHpgRe0

DYFCqMBC+dH6KmPcCzup2SrXRr//1wsEAJssx+cONO
MjjF1GBc7345DNZ76cbui9By4FlqxItf3lWqEtLrXfoGzOxZlHwwt8Uikudvf7GlN/Y2EtvfKbHxu9h3

tngUOmubKfjLYjzkQ4QCb+M4XlHQK2TuhE9YP1thqnH4WakE7wm+3Sq9Kg3DDMfAwvyO+ZlB5WUhC54h

8FllZPpNu1uqnJ3grIRB0ErYk3XJF2JeRC0sYxLknD
CVZY8k5dtrxHx6Aqj72/3or/8uEby1z++n0l88120nmX3ryQdCofytXA7FgLNXvRx0vphYiGfbH+pwda

FKl5zytFi1qAGxuczjQYeu6VcZv/bMB0cV8vkgcVjUSPpenAbY07jWqh4BRnZUfNVR7VRSI4c98DX/8s

MMIKetEBGMEHORYYYB4J6byy+E9JEGGl3+gE2i7coS
4eh/SZGe2X6ytArg+FH7Epedsj++YrwZHwN61z5UTMaAbvRvOHGu/k1d9d7LxTzMxWpH/68yFyyezKnD

tXEFt4g1Gv7vA6G2Yff8ofEY4Au2tnD1L51DxN1ljGLV9eUQy9M4ON1RV75BH2IMRvxY8VsoG8QdriqD

KynPQpsAUm0Eljx9+v9cp8uW+2aWuiYFhW65KcFXRc
ct4Q7guN8NkuTUQZ0HNAm90EwGpixwy5KKd32gKA314Ue6WnxxB1XTA8Uba4VAbJz5b/cYYF+dPgMSwv

e7Wp21FgMorCuUmSzjy1dRdcA6VwP/MmF2pBu24pHsVxKrGHXndOWG3YnX6lXy+fFnjq/PsEoZhFOtsM

xoBUwOX8NuR4UiFdaPJzuUrGbRVXBdjy3K1+NJHNpY
jtauZC2kMINnbTQ4r803Z0vemHGFgO0/YHxg7lfxg1blZOtc4iuebqwdSEXGSZmXcTFovYPGcRKqahNh

A4qdiGcCUwSQ1iWjic9UnmLqsV5RW13mFbyQk2SmnTcyta6B8eGDjc7Nve6xzu3H7HfGGFZWiYi/L1vz

I9Y5124m4uzw30YiSHSg0k4gewMSWiMW294bWtXfb0
XRvwPwCI/LR4GjWcRPhwIjCXmv12XZSIPQa3DtGNinLJg11ObsYt7zcr9JF/1QOATSDyAfCXyeUBUDDb

FOmLhcre2uERAqW3B1cv4twamEnCEIG/+4SGQ0J04ENB2RCBhbVmBjSiUUQ/qTHoceQLjbufJFKwUckC

4+Bw6zXq88E4HoECDVoNTPuvbzwsgVdH/tMCYP5L+o
yaJHcJooOk8c0uksY9MfHqhr6cT+H4epxto9HiGn2xFjgFWs/MidFU3QiLGV/PdFEUuSx4SaEB/66wf/

qOk/yIIpWka57oiRTAM7p4sYDKn/5VEsjTsRIHeq+juqHbkVVZLihQpK44GrRi9l6qYOVogJoaY1Kd39

wjM+VUm+WP5TLlrR5jTOqms4+zYU9MIg0eZGhuD9MJ
tEwp04r9/RTVz5RYsX6tTHKE93w0EjnPv4O6xQC347f8Lo+qcN9130tZRsin40qnbjypKskpO4V9m3Br

S7UQgfJi7nklG7UvXv0z5mfKYrsjL6jRy76ngibfaBRgG0LT0F3EnILdAlvHQrG+aVIx14d26y9hkUon

vKpJT+Qz67mYmPazKUC8+HRRHBa09QvDj5vJ8EwI4R
28wKAqjxP50EsZaMZGvsDxAGhrxRyh3logDbSs6dOdo0l+Ti11+6lkUpulT0gnaEtvz5ESDTeB46k7Yi

yWUlhdPosAs5RbC6hs0u+PC2dnqiD8UyXaHly4ByrY5LDwuKycjhYFPBojg0ivOLiVlL0brsdmwTUdeK

A52RVuKiW8nL+QBnc/swJiQ4gw6T+vM59mO246LbSA
D4uLkevW+ZMRpO9xEYrbgc7C63FgwoI2+yZuYLRVf3vpmfZV8W6gf7+ix61ZW8dLUGHOtHxCPgtfNqS4

Xoubtpp7LrPDypfRNZ/EulBklpK/tOe5HI6Vue3cqU3TAe/4mXt+qPSvh2kGLvvRUYb7UGkeypYoEtr6

mGyoqZtbYqzd1+hTSebLQeWuPrxuAhk4oF4mmIcUnS
LYZwrE9XrM8ba/BSSgnmXWwIky2JzVpavIdcR02jF3ksjOt6MIdcwqngC43mC1gpxC/v5hNEor69B/ld

KHaMzZUcNJTuM34pZcRzHrZQ/IuI6KIv84vYWA2z+6faZQEX9UJnxfAa2r2Vyy/FMyinxnibLxmYvfBO

PUW+ks/TbfjYTk1gt5yIhKzg5UjiYeabvB8UA/BTyW
bqmCMqRl1spB4NFhif5wjXIkrRO2+A7++uMwwJhnH3G2sQF+ANHtlEn3zm9bKmuyS0flCFpDaWhxprcN

KwcTkyoiJKL4Fpp83iXJe03bX9ADeBFu/HHb5wHVSR+YQegKBLpzUmMb88tKFgpc6GrXvDoXIM2nEYtU

k5XTYEo2lZcHLIqxPIl4BoS7uDDhT961xRrczV8g20
29Kc6vMkdmgvpxZQONfut5DuNqrELj5MMusFnK809ZbVi9lbPNcxQrkgX8T+6/B5ussPZstSrTtjJmmM

QyHjFHiUy6xVyHqbStXHB/jIYeANC1+SEu1EFRRH4VDLbBTsNFgQu57TwYuAo+CLdHSwe4uhh52NxSc+

RcrRZpeNvrNj338/8xOP5Rd3l9VqqKQ1RArieFgYgC
6Qxa3VUoGCTsp8FegUOlTCId3JVfYi5ixvWxvc1emHuDKMl6yxaIX3bkF4SMM6p2y6r/IaZl7anNdqPM

6YdrPx36/TaFh5M0yQjgPqqCoV/ebZ4rXE6qusj2+TWrnktjbMJ8a7DWak/Nhv/Vf+j4GFWriKwo4oCs

EWFBTyEh7rJKnICx+tbuTzEmbSL2FNRxkxgs+DJwZD
l5rWjU3yL5wwnDKjYLQs9QGx1gxYXqD3X+4rfoJuopMHUq2tojWkz9FVbPoN+flETvt6HnLIFsFFYRxK

fp27Xgz9l7illSVznCpGbAegXpRx2u2mmmCHfoD/uJNu+NTS5OQ2XJXEWxC71wr34uIbKK1rnq5HHnSJ

1blWroc32Ic6Z52pUrY3aWFDw95BP4XW1idfsasu1A
CGiuMdrgH46/OZJyXKXmVCr/Derrick/iLBEZrooWyHFh58WLnOanNg5Dc5DpW6q8nMST7/XqnOYST7vWZJC

Pq8G7mu97lEqQXhJbfB5li94hZRTdfZMMNBwYLMYnsLlAE9QDoI8agRHlVIzcdbIi57mi/PGheE7Prxd

Hz8nS6pMpyAAkZOhlyRjO+nn5nGfF4p0lR8dgIV0U9
0/uST3JWy/w4DXZ1Ehe4M5wghQgrxkOUdaWhA/qNzFAVhgOyQ//kZJIAQPz5HEtwcjSraWtsv3ya4GTf

HvkAz5QjfPNR+ISvMkb/v5ATlk5d312NPqnNc5m0Sgp3cf7YcemU+yRo/W8fR3ZcFByXHdXaUeH673Oh

HqUDtUDQdcwFE98gh/9ULO5uvHB4tI/jF+aThp+NpH
y4Tgr3uyfiy+AhU9DKqJyKAlxCVw5SFEdJ+3s1Ugy7KS5038zpwRSpbGzIhTEqFLH1Xylo59y9xi9TeD

0+tzMtlS7oM9GOmDKeic+hhKFd7xNWhqLBFL9RfkCmlyfrn8JPgZkAK4zqI8IcxZ0aiG+zzmtZKkUaed

cKwiq7N44/zPb2wlK1yxopmezhLDEByn1OrqWW3Nur
a0RT/s0BM7xb3F25bISZXbSYktBf9QL0rL/4h4+kot4C3drQyelmoIxopXaOn2lEuvNZglvCZpv0YPCS

97PajZiELb1Tdk/OAmpb0ilxMvCBwMKxmnhJMUL+PBxGw1PXk72OKnYeeSWcWuAkDuP7HAcOQVMWVLIW

9lD4Gv48kjPx8SuM7c8/K0q83vY7uBrHpWxYRT4LSc
0P/Fy9LmKqxXt3se3U3OgiMrDuoxovcyZQtvXn7zU1o4P6Fb0tC18UglfpYB0xohRrO9Ifp1x6DmqU9J

pYr3NLiouu2qUe7vGeW08qSFNejp5+5UayTDMr9gpx41i9Y64KpIK83k6gvUNQtETrsOyK8LeZrzSpEh

9bLDpfvvbZnJvHjdILECEirwHch+wjo+iLf0A0SS0O
4xIQUduMOXDgVSS8W4J0lU5qBMi3x6dF3u/lv4IU67sSoax4RgC72WuAE3Ww95j8T4oS/sooL1I0IufS

LcqNl64ZjV0pssRp5XFi2H5mAhvc+E9q/IFu872KVO3RFI+mgoNI+34nq3+V/h+Ns0JYk3BAg0fo/zzY

c8L6B9gE+52bIZFZO951lzl//10yY5gD4OHb9NHBbp
/ssFFylaBguDglx7S1N+twisw3Dz6PXwwpzaI+JwzIlq+4CidqxlNDBdPChDYDHj2zt++h07p9PKhS2+

HGIVXHRcGQBq0kxh0eR0zEtOQuKURz8IHkaldCu06u39aWbMpyDHI4WN7PUOV4et5sW3MpkxNMwhSoOF

K/tfyIv2DyWHw9+Y1hIFwAErcAtWM7mD4c0+CR66/f
Y94EtUQj6ZhtLBSONoTCJbaYdCW0JexkAkAoVJ6xLmoTAbwHd398TqyBrC2BgngmLTjRQFiL6+Gl1S9x

NpjP+OtL8Gf7LNLY+wyO1hCVXY17Um/zGE4/37dEAmmo35txB0AeAJnkfAVhIp/W7kN4dbKKgQvvp3VC

C9jahSY+yJV2zNtX7W/5SBOtYcBNzaGGL1lhqQWXxW
9QClVJplGkZd122fjzmjgnelF401/Ax70x+9DSmkdwRZcjwjQanI9Hy4fe77oo4CuRxc30shC3dFfpu+

icXF+d42J+s3yW2tg/ov1kVfDNK/ZhBE6Gyrzz0U04weGpH8sKMro/BuSGfT8aaGQsImLn21X5NajRak

gAhF/6NyhZIOtfLravFNqXabZAULcHo9kYlWe2FN1K
BdoQfrjXXPYnfqc3/isXKYW0V6ur69FIf3QE9ozSWn23Qwsg+CVeAqeYeZDsWjGgkFEuwcmmdg3qyCOj

W0kNFit3OQ0tlLcc9j/9LuhR+trXlHxMKcso7XiUeeKZW9WEg0xBsxdmDhy06PNL7wJqzwxmEtvM3eLE

Pj91ueKEBv5zHa8gcc2d5q3Ax/mt9jtZzqY6h/XYUR
1c1mzDNZ40qkGl6MoO2fy0rxcNGrcAzAI/nhVs8qJ3mi2nZIg8TFuCxVTqZ2EFU+tOiqLcN97s0qLUIj

R0BDvfHjVTjaiPULlLWTqbF1WtqW4JOjuFJTCB8jzvWXnf+RU7gCUE+AvimMQU+WxTMn9Xdvw1wy6m3K

TxbRDYkWwkgskATtWd/QOLYx/BuGVbnczdnCYI+jvP
ygjILt6CLG1OUvV7vs7UxRZGWN9sOgVpwweNfpTsLoAmew0ReugL3t2hhrPnfn0vO8UUZRrRhfCIxb/G

nXUFCdcM01JeMHfPtvQu/5tE+TOTSgBzsCruAfwB2A3YZ8gTlwlqh5Lb1ve4VYaA+Wq7X9W7CDTAuI2i

S9mqmXVSbuLtGTACkmSiChYhI8MSOJYLgqExpP45KO
2u37p6p4JGSIyXRR72Hqin0JXxs3Fje91J4CvRK31OiHmm6uCTl2R0lgimTr4tTwjNEx8/rLgd0S4VEr

ApNHqYEUhDo27ftfzuaecflO3mYJEwJX/RrZwD9jxQwOdFqKVye13lDDakNZMLangcrlOxe4uaLTU/5o

jM50/Pc4AKLajpvp2YMQTZFEFiN24nW38dohftHrot
sJ0t4xe96toqzXuj5r1tgRwwRYE49U+9GcnFrArzkvupBaaNmBkHzEuJ2WkLMP9ZmvmBIRccRVezMxbJ

3rnTtIPJOYK1n9a1izltFSCcDcJ+loJj/byhYQw8xoXJFOe6IFv5+8LGPv57bRK5uxmR7OfohwnhTI15

CvYwgXeFlKPtgqATemtbnFqF2MQD6jtCrRpCb4rjwf
8eH7qIGlL2dfrshDqiYF0NX1WvwqPbHBVi/QkGghz0EbHtx7viFEN6G5eekiY7l/S2uYDIQKyZL6JR37

0isPdbLMyYcjqptT0cxiXLwBiKsBI4r3vZmgxCYqoE4VWkL6yc2eOi1gjaaWJ+Do3JIlSG1+yCVRJjU2

TP1DczNxHSVrtuddEve5I+EJu3+PgiKtWM67uuvsYd
/UbSEs9zZAW54+MFFcfqW46yyPTh/9I17G6deNAlNJrHyH4uam2V2TQ8GnXLPqPJzO4Yprf8CSMUHQWZ

03zaAvtX2Pw8bNv0ErVBznD05No9Z2fsky6/ArKHab7U35aIBxitzSg7Ya77RAqhGLjS6ebjiw1ruRQK

6cNgLhZaTscSni1BZ4k6n1GkSjFDrJOJ+jvqXRkGA9
g8YVqAtaJI2Xwz4IbN2ptNvS+X/hGP8tqsWUPRN1N8GSkqprqe9KjhM5c3UN/IAB4L8ZvFPV4jx9Wvi5

i/P0Y1n01+XlF+Z3uWWLjDFpcbkQfCk+vw9X72IJ9I5AeGJsy/dMjzcLAN6p23cusVSz93vwtvBDYjsJ

D3bnUHLNXFhB8ieqiWyMzuwrZiFLQAMkpEPV50uiek
kEuNZ+1+sYzI5TUYXGiWVZC1+T5HjXGUffw5npOfEY7LiXdR0++ML/EEMpaA6Sb6hN1esqlbf/yjlq8a

yKsbq1v/Wwzk6YjrYWJuT2dWx44TAdl5Vuz8gw9jx+HeOdWpeioY8Zhb3agmNCM8WMZsfg/IV+zFNKPh

kjMMxlv/LFYS6snPK0XfBIOSFb+lRMaApY1txQJssN
37eFgFhkAHoUGiVWxPSERjHLbTcbaNUdK0NicrKQ07kB2wPTCrl5kEpX3IsyXP0Sqzo9XaWhaHS1Bxnz

+jRu3clGNKd26dK0pIFoy/X+1/kgbpXVVcX8ZTeRcEIldeH9bwWgwWfshU712jQkxghnaUMhltGXQ6Qp

xyek9AWCf4zqPSUsZcg0O7ewzc/WNFwBPWfL33A5vL
J9ByDdqKP6oypn3e+tIAeknoNyEHalmqqA2lp/PiZcJHza+ASktWU8waKokT7KNnHa47EeMMxeah6J+O

lFqF7qbL9w8nYGJz2MXw0EhPVnZTt03yunAvsfZ580NXfMnSx8zvzhoNKU7MUfg50wzQnQGdV6JZGLRh

dTQ2pxr+98gTc9MvON72RbMwlI01eMyNKF4+tvGz28
IDRleCmyzXIYAGCdSilH9vEMj7N6UZhLCHFmbZ6TYU2Ea3VYSVnl4A+Ftoel9Pn1Khn1LrUOPNRKd8RJ

p8pVPyKGK8sazib9yjzDeL2mC35ECCc7gQwqR4rTWBNp24kF2mQ1fk5amdnp65sf5ua0PA55I3ipH9nH

s+WsjnhHCrhL5F4g8VRjqVUuG5ZsSDfymOXqHRMMLb
mqipsEckqiCsiZ6oe1EaE/zYFBKUx9L44z7CYrf9nGsFJ9QtVzwKHL+rwpE7raxG1e48jh+a+xrCoM1k

nnaWj61xnWpU//c2cYQkV6HFEVpXYZi0MmLaiSI2kjvPr6FLTZNiAKFRyqjLhCt+8f3FZui4mJrYmswI

rWGbKiHicnGMRbmNdMGu5eV21mOUTQjtIfhvI30rTE
f0ad5Be1Cwkx13G9MpGdz+uQTFzrrOx9EkcB06bmYvaZr1412Z/K2grabLhHTSvqPk6qM2vO4scxuXNe

cX61GlH59YkNnXcFagav8Vmj3A7j+oMj7dGW0z/nbGzwj9y5yUGs5eUAxx4R/MZPldqANitbegCvxPsO

e/Hkf9uRyc0MCKP7T51ri7XsJDvc46IDtEG798Sy0A
WA/8JWBQn69F+kJQQ3DdmJabr7Cqh19wJzFy82rVPkow2ldtkT4btIzN7tjvAniTyKVhuG4QuJlRmqJl

Gk030HpQCJippfWfSjVi7AUg4Yr0XLgdENn6PMjhFJYGqZgm/TMbpyHNWkXPj2DMks5H0XcxxDlYHdzP

TF8iORaxS3jxCs1M8T6FoQ4Qkk3yR9PeZbw9xSBizp
ndEBBMsiL8j1tq3FfRaqmYyhHKrBPcB/jfxKmwQFsjZRZEcBmn880q0hkSp64sAi9sRcQEb97Ywf+eDb

U5HWZSrM6Mz9DYw3MhmV7FR3gtZ2qSJj+qCM5XFpHJDkw+Z5qkN09fREgu14T3Ff3bWT6MxWnLFsztcf

bY9SA/lVjmIv7SkZNWfVB+QbjelbZFnUurCbkJvZdK
G25B0llZh579HyZ0c04xkQ146Ah4rhTk/yuxFRyWEqoiQRKpXoHqup/qIB+lSq89v5KNQPMPAMR3aYLQ

jDf+X/2kp9GT2hGNLbwyKdaEGjYUfmtiZ/fNNZ4Cd7rgYDo57PAwrPRAvjxozlkgmkrgFdAMNRW8esFJ

xPYoIVnNNUTWg+LQojA1/kmgKt7NPmlA0Zv42d7SHZ
UBbJ1niLa52c8SAdRPl7MTn7ycNBAd2xKuhy69rquyTsvB4iftXN05EWkYgjR240r75W5jMs257P2/bs

80rT+ONSYDxwyQQdKead33tyvfUur2f3/KrAsK1jFGhLllsheWTRZ48Fv+hCgG/AUgC6lIV5kMguqeRq

IopkIAHNB92F1TwtuvLIk+1ec57IiEzDDu8m0RhUg5
mG/5r0y/GhI2Pq+8WsS64HS51M2LrBr7vELJJ1ZrmzJpSspiFPN0G6fsI6ifMLX/jusRisBsvDmjgX4N

nOOZo0hl3YnnkBIKG5TOo1qq8WMVaXoaWybOxUlsGc/L1xsUR/vV7xF97oyr9jpUB4HeHHQ2KqGAqq/f

vEcZnPwnzjqdbp5+HRmBV7UqpHYFP11PAvopzMMiZm
XlKM9QCuzptr5c6CrR3Tx6era+AI63CFfAgeFOu4kwiIb1B2DSuv+D+6Wr8sGWqGiP3giPKnF/MJmAzS

zFVUE2+1T+SAioXlUFzDH6JVToQgJJQrpFJ2p0If7nLVP6du+AOuZTihnK0aKo4dTT/HQbIdMYgOWkzF

Tp+oyq5MVcWCnagEByvdJG+21ix8U4Hp9+DmTtvB89
H/ZVwpowvF6jed0noDq0FfLg3KSm7G+0h5D8dt6YEFd+rk90pTmxZZI6FJ03jvHLD9Y9NUxE0SFK42RR

Yfk2Ja+4ebUnuthSTl2r1qUpvsPHg8ORDiAFBVxqgkIk19kD80uj0XnBr5rkHBwQyETMKN3Utgtu+FVw

5oH8XKKYPheV5SFeeMf+zfdahc1EmrI1d/ffUgu0eE
5qGFHTI+mtI25LY/izIocUYCesL66khpOTrA14PU552k40HSeJN9tulRF1glPWbKhWFtbuqAQNwD4aTo

6BipyYAOlSQtv6XMch/HcHulJd48l3K7s6Kwon3ZwGRdGbc4QiQhOluUvqgnX7FnymHtkTnz6/CTDRlv

qLRPeH+w0lsH3pKy2JdxpBFyqfoEtqvXqXM0BE9hZU
YfhZ2BX1cwmWS6gI2bE8YgrL8Kz/qzW5efm1YbREvIOPaU7F5p0fgZFPR/UbmMYdDwvm23Xp5CZYgYQw

jo7vKloQak+h1rHPm8r4jRL/SX3IUIfWAKC4f4O/sSaYfzl+3p+arLnYpGyut+iPKEEzVMsGJ/JX0SzU

rCsz9O6zT6mW6Ug5sc6F1zIqTXOZjRabh+2cntWURq
TTGxLQcZhWRVaf1u6vHWCDQ9ihHKXbPciwJxR2UgFJpa8eroyQxk+TRpAphvMJedCx5CO5HXuUCduraM

jvl/bYgWS3mrW4e6IIb0iasEAOcK6eiWCjdSvqH5pKXf/tb9BHaz8N453Jqw30j5V1AGlrNiE2VfKs4q

2B4Z09qt26ILD8sQzd3+xhbphOtrdr7hRLYj1oi5yt
jcEC7WN+PWTrGeCRt90Co1djxwqMxpQU0kWwPrYqoV5CHMKoJokD/n1xI6PELS/qTMe45coVAfGYo4ap

uuVfMQxnttfGhlVtKpQ3iDi8jODDWoOCmu9DvjRddK437ts0wdOM+2AW2buJoB213UMl/JWU6dJvaVkE

DBy6sQ21f4UfJ0Vc7tT90S3f5MmycL8OtI6Vbr/kgA
k95t2KPRz3WLRdRTwqWUsKvXKlDgNuPy9CKPBTZnwbKI23/1+UF+HIq2VApKZcL3bAo/j8nWwmvE9qqe

bkhzt7S40hDstgaehH0eVfbh7zRTF0WHg0WgI+YnUuU9DrY26jq2TG4nsj4E/AmJsb48b+HFOuPxqn7f

fdxpCpxsIkfmjV9Kef/ksvrAv33fzR5Mn5M5+fqzF+
4rKt0eZuJ45lNza8woBombvcyiDkOxOLlkKGDfnClvmIUTEnCAnETWHunlBOan1c2hpWL3voSI0wAnEG

zdLQ8fXfbO/7AUnXchrHNWZpgAw4GMVpa5OZidk+K2741lYNYhVjERj7iaUX2zynht+0Ecv0LeZdxrfv

ViVV6RFEus92l5iqnqovi7Ff1rM4JZpBGqMRN9dB7O
3MAJVmWp99kAvaLcxCYeqQenK9X6lSTiwyB7XgQTrUQfgJy9ZAP894U8bS2SvcWpQLgyrHG9+HfiH60d

ROPcgcZLY/QSr1zmf/1JgQ1F2bVb/fPEWvqdAojxrF7jBKlcbqt365gfC07Eh5esbN/1iqvJzCnNctpT

vW8Po5x3oMlZdB379SxMbqDcUnnMvshLdqAPxe/sKZ
iG35Z7snuKVU+XjWZuz5fd/wZY0lf5JPW2c97qy9G3Qql2Dcn1qb1rkIv75OVreriBUtbuRszaT0x8Oj

tuEL0cCYkqoBjgZcJmbyEbaAqan0LoqSyDJv/u2iyWlLMeT/B/X5gS8q01QEtZ85IPVuvm8DjQOuajMI

1fwDiMqU2WqTsKUsE0GMiiWdpkHlh7N1/COQPg0RLk
mWJxMU30k+XAlHxsg/6YkglqgNVizjeyP6BJP8yYPH3O8ziSKCWeiaQh6pF/0/cLHBUE833Ju/JDDDcn

Gv35lHiUC0m/q4sYqAFwNMr7/Sf9jzoIFfLowKPgUdfWt6ksVH0oeN67Pt50VX22Ihgn2FdJkwU1gzRp

gYtQwBXpQ7UlYP6unb638tMbzdR15jrzruHerllqj7
NbHkbFe2nM+IMAch86nsNRuB4SOCZ+qcWtt3q6qyP2R7/Q8KwNezxyuLwilelH2SXsybx9XwiIe2c3V3

+l50EYie71oak1XWR1ZeyMHoKdUjPGgbpYSqCSDaURTmr+i194gB9JGb6Vznr0sA63sOhALFez8mnBNR

t1Z32muPDkBzbinyd8uZIg2GhmiEvFfP6R/n7ajcRv
JPl81U8/4gMAGH/HQ9r/OUlcxBcPaOElQAQdJ4GmRMEB4AEQJudexgrnP0f+wkur4n4b3sbLxoSwuhuK

fh7XJIXkgNF/6LJfXue0CxQwGYbvdYpMIhEYEb+vfYzF7c4EQviXlu1zyKrVNJfWc21PO6rwU+FeIBAT

SA4Gg+ZJH6eew+mSV//36OlDZqt6CrneNWbkTcxSOd
qyTSinwrVz4LliWZGn9dx7jeqCGx7jrozwxr8G815Fe11q8XrwvlQ223ZWakxBJxA+B3beXXM+MhcflC

KJejg7cpVD7Y32wwQQTMiRyry9gYztxzXq1PD6fEGTlIwbVFgA03bttLzSQacOXgsx06J5bk9hDzQoMD

qpf9gL6925+E16zhboDP4EBqTlXSez6bih/NmHn1cX
Hybj85DhVBDMMCyeYeHWcd8oi20lVJk8lEmbNiY3a1sZGZ5Ad/6eEA5g8+a8LLNhQKTp5L7Eaz+P8HBR

wZ50MMvW36A3iMUOq8N2n2jpjF3hMqRxKolSVuaDWp+xBkX8ACT2ZRYEa3KNiYzZSnUCGI5ck/E2vtP4

ngmeC2UIKPVAT8uV2HtprHxl9De9XNgVn7DLUUclFT
+fRIkZuNdNJq3PRPjKqcSoKPQ65KanjqPszvyWNJpYFx1Tdmng955yEa2/XezoKF1fNoajmMOCvPPAM1

rnliMQGK397b7xepiEyEeIK83NYl5Ebq+qXplxEDyi+yt/R5jJI09LESKdKl3czHN8Ness5wwZkLSMJl

JLr6ok8yFH18kBnrDkk37Gi0bupVa75E0Z0I2ibPUp
OGNRvUGxYxGGBbT3TK4HkLM/JEMq2ZpSirjtrmMbhVyrEpuZFwTiONpFfMRJnzHxVctJeWrpfb9hm4tF

ZLRwtbovmGq733tHD7valz3JAjTZEyqsRf2qxcQvLdlQelXdaKWN7PCGU4mHVNbCISsdoezJZfgJH/+D

pJm8WKaRZrCj8bnZ4kwivVok9ol3vz3NRE8L23CRcz
TANnDQ/yMqx6UbagPKp8yQOSXjGiMDS2wsel/PBhMbippdc5oZC2cRQb37SSX3Iyzo0b0u7DJUQdygMi

m3usrj1aILarEoWwgL3xq8DlNayHPWviMpNEM2kFWXeXzHG2mkILCDoNrAdQswaKqsx6D1mgUDgMCItE

C9UJXaTb3U609G4tyMrsrYgkie/ROnNQa5t9283Mb8
RWXmEI8SUPQTfqV80otHdL4GgDucP2Ts3/KLU/Vaq1NaJOK/05dCuEeltQv0NKOF0vFKc+oQBrHfi+aE

DolPuZQTifNEpNcAT418xUHJj3qdhZYBspjjag6aB1giyqV07oLmzMb6n0xqx7lnLo7IhP7n4LBZSbix

RzUsnehXJTwzuwlA+m1uUZFoNR10BC9IMS4aQaFPSb
Nx5N02kIQ12cwKldAnmPjA+WkHd6KlUeb6qX2PDgk36kG25xT+77Zem4uvVYaRv+fuhK4VM0zEKVBSfC

/XTx6XOLXeh43ppkdN7CIyFbO8zjy0nx0SnBJZp11VFP4U8LFCRHBJ+XlzixsLTMaVlTFYMwEhqG0TYT

G9gOsiCFYinKXSMMTdtvo1haYTGf2Ww+TTVEJ5Knx5
+lMUvV45r/yGoEruVF4jntIegYcX/zeLJguUZYuhQ799aVdt/+Ph3f/jjQpQCjj3yAWnzWqLx8l5RZDj

AHyeROaCcYDgSQ5cieRI0Cd9znkepaJRqc0rXMmhbfJq3XtuXA+191Sn7tkLz96s651Y0GMUtYPSdZ7I

8oB0jALfKvHORiov8NtmX127C65fxDMdhWvdlJJZRw
HnMgB3IMI551Z1UZihJuorozfiSldeo1IEr83siz+6WVhSrrFdUdLiPqxiD9eFvK0NkMhuHWu1PM6t51

IL0nwVOInItGc03INQb6dosLeeIsUE9Ya7b0xpIhGmYU6PR67K5uPcxPLSnaLw0KNhZa8B3oE50AZYBM

rUrRST445mmIr7vTr89BnecfclU0sMTrT3RAejcX1d
vr3C+fh+A4ht7YWvSs63/MC+tHn8+YaBFnycD6H3jzOLVtJ3Y1EVV6YnvxK1uGlgcgJDF3NsF1RfKLD/

cvnr8VzlgL3Sar+lUKOl+H3UjoyDo8AvYIJmJXgLhrC/y3WLXgK8Zsa+MeiCgiCGfdt9ICm6nmC2n6f8

h05sHWVDvi+8qsJ/siP9MN6SPmJYs2oMRKhJuSi9IE
ukDRGgZ6yPIuchncsgzr4mzC/bKU7Vcghj1c/KOUHL4n7QK2u+fZKAOx+HxCaV5ZZ/hH6qvINQJx6VpR

LuxKuJa7At0R6ErjxOK+w0CCQ6J6a3CfonDv+KP6YipjYpdMfqJf5knu5aXPSowEWXM1uTdfMQwRYPJe

SfG6Ouo5tAv8ko9miHtPxMUz2BesuQMQNjohsK5Tq/
zK+/VKPIAceaxbezGpDbxXW1fkgY6u/rz/u802NQjdyZ7DSqdR/bNuD8KLmmaiDWhMeajuidLYyuFYeL

wYd7rogsk/dP73+66yvKJx5xTZp+ptz3PAO0qQlUmwiRG3DZX9oBbaugb8y+Z6tjSCjhqjiqynkF+8jI

Vpghkc7HxicxYMvQiF1aD4Rbuot8/MF1g5TdkS/Jl4
0ROUK4J+VWZ2K3eYLPhKCVUKPeT0qJr8L4iLHZKNdqLgIzSha21bMNCZClCBbab6MFXjNRvav0W8BgxZ

RVunThp+5jhauNBK+knJlyKIb+qnTsKgnzcaN2rR6pEWbrG3bf6GpA3IHilym+JGsW2k0temHjxkAE4B

6haXCL6/2ojOOKK+zhGLuYP9vWDLssITffX71JA/97
zBMhD4mpKuZ95AkijYwM8nSjWWMpHXH48o/T7uxfuG6PuJ4o5TH7ydgq1Yh5FVejT0uxlNBcgJXG0THJ

/8wmj6e5isn0I30h7MZ+9t6vb7+UEwkpg3/D6BtUaqpRbpXU+ALmpM1mFyM0wcqa51eW4dWQpYtN7KLQ

tUUsaP7qMKzwK7kHbH0wWqdnRaPFSTSmT/bICJksGE
pQ0yecQP37asB417U/2w5oCndnjmo2X6k7Q0XHgYV/AkWwTAtgWoi7YYT+6aRSN6K6KHf1bDnQsq1Kj1

JagGf8XbC0/nVakvYG/n0dheUKtf4jih2uXumLi4s+bydoX5Sn1fgAmJ16RNYUoe/EUS8PVyTmpUFSty

tS2sb1Y/1osu9xvwuuTAkN4rGT8vs6XRB0TwSg9hBk
Hp3WfFPYyQV5u07VyNgTjxrPcP90qbh0ZQJjHfA0eSyie1/WvXfmznyZNR/Tr3Dcejvs+3338/6eU/Z5

vC8g3Rn6ysyf8kURlSharMYC5BhqnvvWa6nRbYdVPnmHgvadmGMYiMV8ickTFXwaWnCSb3E42U99eU7a

2RGqHYrRm6aGGVr10d4F+VGdGNg/bRD6mx7/yH1PF1
OJUC+HxMiuqkn952YpQ+vzTeydOxpJYFhgA99nXFNI0VCdXcXjtsi2Fk/A8Q6KtqboEbO0J4BrdFsNCg

pUKvI/Ish1U7I0hVAMpCIGfbPCxPLk/vPVeDwm7T7WZRuCXqEDfy9lgWMh78Ux8s4ZSCVkSftTgmSfsZ

MaUZ5ZBnDAixwMFxIRJEDOeGP1OutBARz9KjybX6Fx
IkFrTkGgyeO/QAPsiIXlZHSezwPXtWiZfAeWVzxJ/ZUcjuFjz4/YhFWOaWf+dNFmWeFcL/djxIIkKqzU

PKuGpdmiuqbIM/ukPz6M09hnYpvmO/aZSIlK0TywJtKhxaPsEzktL0QEKw6TCJO90fYrv1E2RVEi4JuC

ndbPKFRigwyKnkEo1MVeA40nLEtXFmkKeSchyNpG/M
GbD1hwKhXKSWW7MBbxh/O8i921HWuM2XyiKu+TAAp6lQcankHInMsJ+VCRMoc2dRog2ts7pBDN5Z1SC+

5x3R4R6DcBkKQRT8fmJwcD2IwejajpE0GjM7p9n4n8DZtqGGOMJv5coC+N9fzn0kvvCDTd94Em1gw39c

et/Uyxmzx6UVv55Kt/tQk8bXe3eYRTZlIit7ssoY1p
+ngRa3523ps0kFN79+YnK6O+97/hqSidpkOiIneGs2sZMMeKl5eWtMpDN0vU2im5Rh89m8hutaTw0JTh

TbbSww3qyCS6X5ZMO028jp3cCh51e6+5a87l8uenwsYy91KZEAzm+pFyzn5rGsIRHhPlgiGoBbZ+WqvR

6iKo8Tza4YjE6gwDPfWwdw2tMBs85TA5dsD/G63BGH
WdFrC5Mjx0uAJYojf3ZrJmJ8H6oKJUZdaod6PXyYoj9YJkbj2LfgJSPzL75ND8bFWH07DH+gxcQru68A

/AITWlFQoLGrQPXP2XuQqY51EYE3KYVmhrQ7kuqHhlI2OTlWb48Eb9KVh4VBcwbiD4wtqyQ+ysjDAsCo

bs8y+ZyfBtyWDgfvcOaSD+JwG3kV8PvPzjyo9mRycY
FBPdo5wr8YoN0vnJvfpK161lAq7DNBSYNEISeqqNjKkk4F1O42id7pXcBxADM9WL5FV0lcLnW/sascqN

mHxilzDp9B2pNr2wmJ/M90DZy4SYKT+i5z3PXa8Po+is8sUzGf2F7Kn/Vk65eiB9/PsYRLmBkANjQZRQ

TzoId6lM3eWATvNnzIgZRmwN/iBvCQ0ifDKIJ25dgS
6TwZPywZkfT6gWOoj+2IroLpCO/WLfHWUE1ULeJLvgbRNGOihDrO4nTj6SSPmHWddgi2G37235JBJ2r+

+634Ow86/qpr+gr9L7+mwUrvM69aoFBTR+HlLVu6VisK/qS3oUhUMrBfOkrusZvTfna97vtPsMJBSUOx

6ecPSd0u0YERmBqNgZyFCnRhKfEttdQk7aLN6d479F
niW0FQ6YLZOFL0VIEIteKwc/U/t+iOkANCQE797PiRuQmyV/ibwoPHr2MUnXUL0pfymO8ZZkspbkxsp8

C5W1ohbQxBowEH54CM0rf+M5EC4n6/m82XWxRHCSragfQzR5HGSlCRMaFJ9hKwsU88S2ypB9gF/688rH

WA1lh1Az2fva15g5RrIWOtG7Yl0BJkMhNmHswAdTRH
zKbjgJOYdf+GNbwIbD7sqkHDYB0eoowtUnIN/aetKUUdfnn/dWYj5SORk7UOPz/YkCOJeTmOhai4qWZ7

dsW7vmn9KFF1r9YWzxQVdoRXnZ1w/TIlruQWFAgsrj23Ib8JlCS2gDZY5fEbYe4G46EMp5ktnlHP0Vpv

KGaLYqZ4Dq03AOmSAKCr2Akk90aJZ3vZDRvnl70OOH
a5eoMn9yd5tlJQ9Gp6tC0KXkfvsXj17Y8BFFGXdngOQKaWZbWXiTKBpCrY9rjtUwDu50DH8NopwKOKsm

k/0WZVguOOy8djp7FPDN0kREIQj9hjtUEWiLXXAUuWld+t13xuoPsY8KOZVokWVb6/SI3ddWA2HqWt/Q

UQ4M85ebgGlvRfrwKtD8yQhVIlnG45ZukV/WrhldA7
eyMJmGleSXzbwzK7/7+LNGWoEaTwH7vaTBQhM0hGKwq1dBtAJl2Nos/4ehJkYbFA7K2reY4a1vulZh/t

KtNgQ6jBQCM62KkWs3zsDq3H6KYtGwjucSawBfuPZ3HtZjSAIjaMb88i1NjUzusPe4kZI28M68plfhza

1fLsv0IyvA3zXgceNS+sFed2a8CjkbMGXE1Efw58Z7
tM3qcy1quNcQ+pLJnTUpUO4WNyB1jJQI+DXBaOmz5gLwnZ7X2fD5tYGnR2nqfmLs64Mg/p0EueuQq9bl

Ec8V3jxinpBAZG5XCWb0lSOD9R9kNm0+DfKpCnFpPhsCJKEFZH1tQNuo/PVnqssCDqVS03+z191NsAoN

lvOvB1jicNQ/JFbHH8tHg3nNXjeBuG5ZwZLMm9s9gP
V+N8s1GR7ckjlktTvMEdMK3Unh1aWoCDiYu5l7lVku+lz16F9TSEwrDtMqF9tmGTQrkyExn8CdO5bAMN

c5SwFEbZds3LiR+QkDHJrVjHXSLJTsr3C3TLxM1yXxopsJHK9jHJ/emkXB/Sr8gmvyuUjOfKdFszxMp4

jDkul48EOcKsHMHSqFAVhdq3DcH11gqKWdBE4Dwmgz
AEqTemsnb/BuTopNBqAKSF2O/F/QvsNDoVHSa49RCOZzYzEmAv3upoaG7eSxKFaJklFqjjKPHmJkxlzE

q9i4wHan1VVarK1LGIvnGt13eWe17ugEMgG2b0tOs5rB/vt0kmbgr2py9cZw7ZQxFURqgfedE1YBtke8

1XqTvnjrTHTAxNPie++exD408UOp52F0P7jZq3aKkH
+t1K+Z7+zBlvn3FgfRBiadjJ9eoNDfpbRMD4MEYP21eYNS/cH7LppzGaHzLwOA+Tf+AxKuERQfc61ySw

UlK/YrlU4Q+KKSwD1ja8cHzFwx0yHlHFFADKe/UCoaZckXw6I//rlV4270JhZfsTn9Yd91kIC1TNPeI4

72y0UWJ2PCWRroqaooWNFIJ6vYVOrcZpsRXbsbsJtQ
uy69ZYtk9wWpwvc1O1A3rULIP1mGIj/92NscQvsLRP0iZRlAnaU1jCYv+k671sZyxHC6JcDootwOOPWE

N3bT7IxPH80prbRwbHzoCStTMaiYXN073bdeLjDy5Ndegd9NT4dYu0wVV//STl73czF9gTVJeooel7xn

iKixJhadT8j/iZdUWTLodx0+SmtaaMO2Tixyup/qKI
xP+wOSU9e+/ttTJK7grM/4mDdVQbMsxcETkqzoUkPjwoFzYZUL2WDSXEr0LFMAf1aT/rJFpTZchCMrxc

Zv2EwVyFgpb1dlB2TbqXIzJWMTrFpzKxR6vq6Q32X81lUf9xDHNXuoay/Cq6y4ZOV0k4kXSzzQqwgVru

mcSEEnJzewjPFemyoFx0l3k2esBIItNyEkMu6+9Xhi
7O02whaoDyty+X+TtaL/j0jQWV60KxOC0OXOvd/AaeZLOYDI8RUNL1w5aNq+J//mm40TYj8XCDcqPulc

cgpphHJbtaqejgOhFVLH2oyNVdCms35Xxu8fT+IeERRRvsnrCynNLaOwal60ttJRCmDbjGKexuD8c0g2

UnYoiSuu49+69iZiZmrOW7wloNbW5OyZJ98FQwcZfT
/1qmb8HR1ZtDNLq5ftf5cgQhdughBtQ445hD5HnCtwEX0wvzlA306mYxDoTfVpRo/szAxiuJCrVnMZsw

YHpYgF3Ii16mqbTiZHFZhr9FOvvxPTxGwuu1MqJiYlyAX2yczisWBeBjP51ANv8CqJ2LZpqMtSZYLSZI

0w1Vu7Zabev0z9GdCHnv+d7Lwn+Bal46dKlxl1F3MW
iLNWK+/rAX7q/ZaBUj2gxOo8S4tpdkgSkrA58f5+9BzxJH/7bMAj0vMP2oVA9U24dIrceNhbg26kevXI

9qm4/LUaJRRzrtanDbzXbCpeKs6fRDvaDXqUxX3tw+2u8hblz8wZpKvq3Y48fPY3VAf1c3aopPkf3scv

MJ5rZDsXu2H9tMOpdywy+DEyNRhBckiXHkHuGVoTVy
S9ajgCbjwrwncsnB/ETpT9znI4rqzuswspsNfv0fb1dJzeAwFJGJ6dPh/2lRs5gNv8iFKYeOBCzxGUWC

TVqTSuyHxqhgFrpSSdNQS28z/92o746hNWqODjNxCJQdHKhHWgbInSL025KcSDEmxsZO4uJsoFh41bLn

c+1J1X4PjvweQgvev/cwSls7k5sxLXU8GojiRJYkUN
ORAYXsuN6f9a7o/03YOhyBeqvDwX3BCQ53BRidLyh9Zrwr4s+krPg0DFdfeHh5BFp2+Zxn29ezb8f/4V

0eAXJDu4wAwqLgGxWVjgTAaTqFp6Bh3tt8UH6uZdt0BVT5MyAqr1t60WQgcIAqoAuISH25mEfP4Objx8

3ksKdrhAclWuRgBeFXHQg1bIFeRZp6oAiwYXg8ZwqZ
75KJkvEHGQ931o8/3VRHPvO8a33TZN3Apkb34f40AZG6WAlXk6IxEpjV/CxHQS6Ec6bG20JqwbqSuv+c

8x6E85bshh2NyFhCh1e7GGDmARlPNTAVWExEpGQ8OXMC79vE9IM14zboHD2LDHkTAfcU7yTnHUCpFlke

hHa14A1SdZP7LUfLMImgiYcAFmnR78yiaD/e/XMar+
o3ec2U94Un699yDha+RkhFOczVfItmSgBnqRZ4Ej+B7ThSm1JQYbMikkhq8Mg9LY1F9fnSmS/vOk3n6h

EapHsZvxQvttgo7HWdy/xetmogThjy6mNZxDSyBLoM93tCA45lO3t8whK62V+Dtngd/VCjf3gkw0H7qt

iTtexxr3QI6c/emmbh84IxEuny4u6fM/hZ3GXfxAJU
gs0Xpxzg5v/EocKFmqaRv6ew9SRaQLvyWW5/1OjtWzuQ07+X6cMgfNbwFML5U3fQ2c4QF4XfKz8GjQv5

wF5RJ1aFxY0uDjFEi+BDWmCK5n+hPJpSoiGY0Q/BZyPv5SWzGv/nzgTj+n7mZtxCzhugbZAqM/xdl+Ui

4b5RTpAsPPUP6vc+eF7AlWb/NWAv5zcitnoofknkT1
K+rjlen1WX0Drfy0AXy2a4KKkHk1FuF/kj+SxSsQZsoU95XlvrA7FgsY1/e3fV5ZyYCIe0lECJ7r6F3V

KEVjMuiMGJddczun6y4/oXhjwse/Tl6WDejWOM8UUVwj9ExVB3+pJLb+ME90yUt8z+teYqAEV/72pHDZ

eBzdoqYArBGnpkXU1sqctBd9lyfrsvqd/udrvApDHv
Rr4NYKw8+M7Pcc7iMK3g5G05mfvEve0j+/vQ5KNooqS7+rLjekzSEJ5kZTZ8IeEfIRNxkonrr72ixekn

ncbburZsAYSfR0OdmOyfqiU2ufq5GGqwQFt3kEGs09YcHa2cZ3KsOjst6PnMxbSTxbmL+7ROLlh4Fm9v

iOBd1cC18WPBNY1XzYwY2kQ6zbeIFekHm0KHIDRkhV
dtKuknYwu3s7g1IjsKnj2jvPr4Tfp+6y68/kCJp2ZCiYaJtLt0q56+RfAuTRFoVw822hts9UzJlWxiZ6

EFIDI4RrS4U1QfS+5Ir42HT4G73cP2QjQWLUvDwQFyeCLEgZ3uq7Cf+k1C5QI52NWI8y/45Xtrp23vDg

78kwkB9VOCeytmRx5xC375bhmBdNMeJMW7WXFKDG4N
ndYJI13kdgchU5K8nr7aENnue3EvyzrXoDM/KcPvuySfnR9v1E2DoLX1kEOjeLlvw8RuaRmZsCjkdcig

+xJYO/guGuT8wC52h86LCmGlCOgkkA+bz/GLcj6N3hLbLBIdl+8DvREf+8piqnMsyLNLP8cfxBTR+Br5

D6zgLNzx/ycGdXNNRNxO5V/P/YtFJ+67JiS1YHWfni
yIZU+GopFHwwdObG29FG+R+Ld43zKUtyYU1JXmxD6kGzNcsbU22yhr8JbLoDMmm11jkVLQF1voXW8gOr

iwc6+ir64Tgzf0M3WEjQP9Do4O/6m10YwXNvzwKHqhyRCuektf/c+nn+wl/qxx+0+nzvI3+Q+T5xeA6K

g4OeQ7Wl6YHb2UVgg037fzVSwmgAbgwY3lFHsyz5GP
Z9jlFnw0j+0Mp9otb+gx/dBtahrNqfXgfAf0eQAmhdb9tIhuQjGgwmZZWGboXbBgE/GXZHYl+KzY+hD/

Ii0mgEjuR587sL5qSwQ3ONman3XVPkoA9hItwF6UKH0JjXSuZnWrj3KvX3wTJo4USaZN/chk/WEcnEsv

CM3l2s3f3hG7YzHOnEIlwl6eG+OYETEU2CVb9oBvDN
GPPGvB1OpYtoM4+nVrtmRB+9+vDR3ZIFtcZOIo/L41bPc2qvZtHd/I06pA9kgM0dkKxz0rN3ezc7JeuP

Uw2l0g1d04i+USqlS7uli6blBF1AmuzMabFhjfC2ot5l6CK5kfpHb7oorNctpifIU8OOsGtgQ/93mdse

UxH8kEKsdDrFjdZORAmlmigb1rfcLeKzfYaTH/+uNy
taTQ18Kxz1rHuYvcPFSTAuoilApgLXmcTX6aFM7iLrsRbpjtH7KCudW1zjzxEcQ0xBcSp7X6Iw07Tmc4

fbA832pD3IFn9giEsIujje/w9v6MiglfSTDhNxCVlw0NjfWZfibESTDLE2bJSWa1ZbMRkm+c2xyS8Dw8

IIplZQ79+uiM9Xxdxtur26vIbSYAT12OtYZzpGJY+5
syYAG10KWJphDLcP6c1xhq/FyGSF3R+f6u+xO2aCYV+2eZz8xaxgrPoetsaXSyL1ywXJwC6M5m9HAZKn

lJPJT5Fx6GXL4rKlYspFCq50xN0NBv1CII3vvhl7OD4zIBUVMMGHIIJLVCituUxcu1mQJ2tqA7U16VjL

S1KfbNVR8AC/VAFxznuW5RnxXTJNY7ennXeQiTaqE1
S8W2JD9gvaKeG3/vPQdo8A+yqcUCCS+Z5anebVESFHJDw2WqAYsol1AAqf2pPD6v4yjrHxqNljO/u9Gm

H8IJpwz1zlCQW7W8AXVfyYZL/f1TJrx0acyyXNiIWbd6RgQsO4+8QvydpSSFOvAdQHMVdf/ZzSx26kbL

0DhmVw+8kA1IlW4FJ2LRLwZToK0YZrpRoUoWr2Qbi0
ZdnskLQwpIRtd5ZKiEkwf9yPDNsopeJ+aeawv5qg8ceDk5LvjEaMvj7xvtYNggSWiUxFy3ncPUxjtj7J

xq6dayRetgKEvPq1RlSRX2t9thDc5uNgpQKriFfdVDE09yqr/t7DksTQNqwX3l6u6ekYhH7t0LLdbpRl

yOXKpG6RbP9+Z9VGiD7mqD+d8PxZgk6RyQ/TcGtZGy
zi9Hx2T2r9XcVHpvnXo0m/CuRGGKNn9QZ12ywA3fz40ndLaJBByqHvruY0qWVHhvrmpQ4mTQO9ag1ABW

T3hD4g0sNt86tDUPFpWpuCNtb7jshr0kHf6bTHQZHd5U5lK+c/SzCV9xGf0tfG9gzlC0HqGI4gg1o9rd

TxsYeIgHfdgZ5XI+ND82NgD0X+iJMRED9e+JA29TCi
xqPRXCPIXX/+YB7b424wTfnotqKV0xkQeRixF5y4zdODMPBJrWwLOZXq/VcmtglJe8qd2qZsX9d534CM

ABYyPrLDyXTHdTeK/TPgopmJ2Lv8b4CTr6AESFh4Q7OLiUJmsjpMzglotXC631AxBh5Bmc4Oej+SQWxS

DztNaoIKxQSyiHH7g1tyFEHzCwKzv8XJnUZI+YXtJb
qOKW+IZJBVmtHHrnD0dL04aThZ8+LQsnshNdduxKFG7eOtCg+RxdSc+r1SNPsa1SPVPeXryJUPrIFEzf

+47MNuEHXnOyHd7QKHgyZgPhpGW/olgBhmgy/UtTFTzJDdMcJ9cTOxPjf2vzzTjSOTM0cEPROx4BYOJc

kIexxxDet04es+aNIbf6ohceRMlKIWbGjoaBmyQB/O
Stc/JtJnxJqbewHqrEc/bZAFyacwRttKBgvG1gS803HWM2g4+Q4W/+6zXKIQruFzN/eNP2BDOEHr/JW4

HqCZzUzWDI6SlSkCMpSB9q6WVm6dZ1FO1flD6xEKj4gvwV6wsUTXuvUHAl2TEiY4+p+oUS1X39Lsm5aZ

P9f9ZcJ36bBls/tuEg4puonEZuT+6lO6aC10kajjDF
oeOELbAv1QLrbgT+Ev4s2b9wuL7GtvBX0rwnZsoEm3fPyt4dhR6+uWT2iAVCUKY5TNkBJDVwfiVUeNoU

tVcd/zya1+SGNv0wpDLd8vS5GD/k5NExdgGsj/fzJUzFQA9tCMTXEoPGt4R+slpWxtbtkQ95bN8Kd2vh

SLKF9IdYhXr+Gis+p6ycu15xwPsEzLliQ3TFsQylMa
QzdPmvIhc6ogkvmYvqepPQvmj/ppGxH/A41lCPSVg3AR/XwWRprkf5CHipj8Bm2P+CFo6ZZWuKK4Qz2A

8CcYI39VHp1T1IxgEFGak6J9xtrmQ7e39WTHz7UM/ZHNkWa8WENWJerYuk80bynSrcmyHKxhUrGin63E

cu5UBQMA0bwxKdJJfzSaCG9tZzWA9F3SlcNMuchmB+
k8/u/E1+WnP1EGFguVaF1ZiDSOrNpzX+IDLoH3RKB34KBuI0fEDK6VrTILrNoHTzU6hHf/1DmO66EoQG

iriBp7QaXvgA6HW+z9FW8uxdiv0NKDK+Q6UNooHl2NWpTlVublZ8ClGKZVHB7ZpF1fJltw/nS/9fWKK3

jg3bvW316ZSSuYgud00T/QUSOUFp6Yg5sU9VEj6ciy
J3AaI8KVbQldslLNGaiszC562F+bM/O972/i3ABM+9+I0oR5Um6erSpFX1SL/c8yfsl+YVuBQLu9CmL2

WapCZhvFOOWYPLutZ1xjHpMq3ReTfmSwz9CnTO4YJZ6Y5a/s0aLE8SctJi9AYH+uYxQLLoL8ao8hbvdZ

tjQv85dAcquLd0py7M/18GQujeWzrSrUdcMnC9414M
GMl25jHmjH1kLu/3Rbi1sLc/1CXdV2Hfdi3O/HcL+vAX+GrAKN4bRroSOy0RPV2raynxTm1pYZMEzhY8

/XdT/43F8aVQxDX1rEBsD6I3nAZze/kFJqL0o7WIMVTqZhqcfappJXhhu1JX8MOWWNI4HKiKgbHTWI7F

cUrQZJ6sYm32Vj02SNkX5Q62qM1mK7AZhJgdKGHYqS
/WB/iPAvCCCi43YtfLaXJhbwm1oSPkBc8cCgAROxcYFTPb7ILEv638bb1p3sy7D4yP/7hLLd97yiGwUb

WIuWNRXV1lNuJCv6HR+ssEi2kAOHtGo0r8Q+Wy8vdW3PBTHt3PH8R/SXq0gy71H7ev62HRd6MNzvVBu9

toOodRd7niSClYvBUo+bBhJzxPWFO8829n3pmhCxXa
xjdvp2Y06z8s5y4/ov1g7/R4s6g8Y70h8o2AnyYcxMhY+QHp8hr7b9DkFkKAAsnS23AFKNfAMLKU/4Hc

FhVqAjIMdK4+Ym5zCtkXWow8VoApYkzyJ/8/1DUpOCbQm2vfn8wJHD/MIUwr+gJx8BpkEV28vnuLot7p

zoS37CI6tFh2Q/QKIqCT+4mq0JtocMMZ1HqapK+Wla
vyW/CfRVOFQdQbyOZPclg58/xsJthxGLxmCor2H6IwmLrrEj1vILsto/yAtVJo4rYJmi/SGx2eIYSXjq

evjd9kg+K7os4ynRMifz3X27yBEHeXfR2wIx3QEydjdpMNa/fELRelNForzem/5l+erGgxlY5B5kb/Mr

3YqjnK6oe1SsIX+e8de62EbVHth0MefOj2t/xjsBsu
nB4uPbq3VaYmAc/gdlm5m1HkF1aBL8tL438Oc7O3UciW04Q+R9SfNcScKPQf+W+mAIvSZzb9xq1D840L

pcM7oxfqAgUQUuHWmAc5Ft6JAOr0m6L4GlYgDo9ohcP8eezBN5WvEOumo7j34FQMnIkMd8Ykk3ZIl98/

nE+gmtFJ1XgaNvp+7COI5wN7b5r9UVaf9bNd8LHEnv
SEvH7APCRzELTGsB8xJAPowES0CZLza6uKJ7JqkEgC6qgVggLHMjB9/RgF+vg9T+Vju7aXKu3QjxT5Ed

tyzinGBMfh4LIsOObz8ybSrHy2d3B2xsh8ioslc2hiheWhyIaKt8sSAO0XL4UFrKyvbHwhkBuJx8wYSj

jSo71/fp0mNDGY1/qBwF/FC2YwaZ/vqYRaKzmtn9mq
8tKW4m/QYHjb6tluW+mwx29WPdaGQcN8FPsUs5HMbD5tm3xxtN5oNP773GUIrosLhMjZNjn3mV3ZCdUO

tlgd9zWlZ5v/9EuT4/CiniNT6mVkdGO0NPhV7GboEgNU3zF02muxYToEN1hRcNHUxIGRopB6ZWmVeN8A

X4CIZUY4MWHwCda1mT//Cw8L71asXfd6L/3zXHrVb/
Q+zkcwH4XZvUAvcIkNkBQLAkClJvv2O6XXIj3EIu6BGZUVh0uzjq6D0nvKxtEchWwcR/t9254aD4MfMt

b1Z0FnowFmdTUmvC44oEp/JQf339Qqo8HYObQhsYVhlluchVvZZNcinaTNlCY92BEnu5RV9kZ1/yykNX

x8qzSY0baA1cghxJWIBDOPfQq06CAbxCFn3ugIhVZr
Q3YRD2p4efpawZFEPdreR6JpPtwrTl02yRgqsR0n8q+EQfWGWXw9Br2jcO/+LoG1TqrOtWzt0AgLm6FX

gggOHEVpQ4tZsQGWhQpAsw1E8hta82MbJh65gkvcyNDzVnePOan5rFEneMy/Zfpr+OnT75ZEfDgcS/W+

v7/RgS+oLgahrl+9vtjXp+mEKykZvs/ZTFVO2k8+HH
G6azhde/h+d7OzB7pkQibfB2ffzaCZngBrL0QN1smH8ZCryweHYRV2E3C7R+60YjbjId0CQqgqp7tGC5

+w5Pw2HFxijxE6eemCgW2IKCW4ZsHLi1iSh73igvEucPVJsoZ/te28Ix3UaM6vz7c3742JFWp+eU5Ssw

63NrnFtGlbP+cREcH7Q7sF3yOFnnFNCep7DVl30v5G
3GSeWLwR+slvshnqGNfnNvg/yAoKR8QAfsPBkqqDoHExu2QE3cc93VwbByuwHPg6xrwVLuGV3IfyJF3O

HNzcygRyvLFgjWilRUw/pb40/IysthlVmdlPBJo4wifvtKbmykf3jxodvNcyNtWKbperTNJkuZdtriqM

F4ngP4Ek9EbJHa6pqnSP7mGuDE7k8xt4ba79W1ydV5
5tPFgZLhfIsG+8lFmSo8c0TqTLRndZtLKuUuqZ4N+6gwjWwGJEzK0uG9+O8TAu4ZPukh84DHCLs/LDNq

0b2apqoRDqAkizv6ZvdTRawRmWm+oR/6t82b607Png2KSabj2NDASBgLFoS/ClwtquQIXef4hoeEMQ6y

V8PfQEMp+s1280QdeUGaFMB7envZK0GmXnvnnrYmn3
MbM0fuV9Onh2Lh+Z7WVR+d8CCSODHa8J2aXhypVlzFnopbTqNllfdKlQN3xLdg/O9SLKT5nv8X6kFjL5

cFxSNXgAvcCmpw3f2gXn3tk49rYeKe2pj9U32QeOi34yz8rMapKizCB+wDn/WIcfLSuUklL8zcll9h78

73TgBsfmphbjhss175uqlc0Plrf+YdaeSLMCY6zetX
fkXt8/4G7dn6Rj+hojkbkAELMe9U1QEFB1xc7PDJG4/XPwt/y1WV6dvr2MtzD/UNYcDnFdy/ft8RXQdZ

ChCA6j02wz/IR3AV5u7+Ss3nbgGBlUr6YDD7NjrXylYlHFnxuQn72NLo/EDd2w6WU7V8bmODyk0iYvVX

x21phqc05XQ5Oxvcyqe3nZJ8wy2+jsMEbpIFJTedkg
2udm3SUI0AgikVzsD9Mmzt6eUj6iIn/2dRi4w1RLhPtXmgJLrskVp6ns3XK+W61hfKRcDKGIwP7tgAHx

CusEmE89XYsfDwn81IxEpVNIae4CU4V/QRVMG7PVjLQAK83i+zMG4QlIkAOC3piqUm5U5QXrEwLgLIEo

2rONsZa00oFBywT65v6aLVSxJfxxoBRyP/+VUX0anX
YopXneeLkFhq+9iZa3OvHCAZejuUFbredpDjQM0bln1LnO8FnPBT/A/f/UIrtOYPB8i/M1/oTsQQm5cD

1lqVWCHuItOPYXpRQhLQlsJ/5NlSeCEg8FOgAStnPm+3039S7fQqX+pQqXMRPvCOwmj/gdK5l+UR6M5x

PrisHbccWeujyyRnf+4vwMuCb9itxOVYWakylxKF1H
R0TjDKuay/9BFLUvbn7UIdTzRagzckRwLGmajvAFeMYDpQJL7GBzeWq5CnxBy0u+22AqwUHSEj6TPrk4

vH0CeWzg0cQVnDvIJO3fp3DVV09mC4tmQqQSbhg+aW96rMFbypqpcJufcWAZTjYyqwjI6wZP4EamNbZ7

BfMPtEW+chqEq7kqgGWqx5yZ5eVRUtNpqP00CxuCH2
bIlXsJdTdn3heVtMjOVJiXuLwfzafFb66pScMVmTj9FUGYfE3C0Gi40dNg+jYR+4gCL4zGWvH91ywL1Z

77Mx2CEKSKtQ6rouo8UpNHBkwXdmKKZN9Vt47T8cjy6Tjh/ccMPOL5wJ5Jk4ew7g7wL/Cuvb7lBxBeFB

UyDK1NExWINT9cANInEmwPOr/v1mUE8a7XBDPca2w0
RQdRDI+wEDhqnIby3dUWAk8ba8t9pmkcjC5NMeQ/Y3H+cO18gqarmfvPieckV+a4vZRaZV00Gesuw1cc

kPjBczVD9Z68Cq/3dSaJG60GInVpe2ZyBvzTRgvc+4TLlZrkxP8rBMqrpgo6+oF4b7xqE8Few7dKtSp1

aSjEZzR8i1Lk2IdeWid4jCmxWzHE8011qkL1hv3sbr
7p0W4W3L7mpWhidOeOJG4N3YDTdA83DWx5kWEsbTmtB8BDK4eNigL/4hh+J4tQLtU3I7o6Po3ll9aLZT

8dugwQQVvWDX0drvfpU2HPLIz8mPboajAz/gLRTY9k/76QD96Q9pMoIX5GpJ8kQXwCGITmQmSk/oZpm6

qusW/hujwAvOmLk682tDaPj14mNq/zgo0D1gNWdjMx
7zC//LkR81m/LtJa4HMECGr64J0yiauVOKsE2XBbiUiD8fSl6QX+TwDRErpSskbWxs6ssvrCPtXsooP6

F+N6l974SwVL3qd/2j/UUOyklA3t9lrhrPZPlZqdERyd9Wl+Jk839OckNLr/lrXc2fMZMKTPbTXpFVHD

iGnAUyJ5TDgVzuYLlL5z16zS2Qabs/vXOmh/tTHl7W
ofsRMRtOAkUE5G8/pFKwNxdidCKSsU9cfW/S8SSKmKoebBzpSO2bjSVmLfMLqISBsyk9A28A6UrnwQjT

zSSpcC2Z3s+WCWGS5CvBv7LsmnzOmd8jLMjIkICQzW6AdtUGozhRRu9BzuXfEE7lkGOa1v7/6J6C1mOx

13Zk3zhOJ6oPQsp2ORMoTzKF49vXJeoG3zlmmTl4Cs
Rd/Qn3IM3QweCbZO+bX+cCWTW4r0Wmk0fLDUC34XBs/0kymk2Wh8ukSwWx53k8oEC+sF16we0zlOkzgl

0hBbYcbczQw2x5FNqq2JkoX+m+wR9JUgk8LaT5zhqQPwkHUG8So3wD9j/QIUtFNg+2jF5PlS7HmdjMY7

vu31a54tmjG0Ermm3eU3NKnfyw4lr4ZC795w1fQy7X
1Qf7rqYcB6l+NENfrPUImFR7UenAQPDm+bGNR73wn14Gysh60SUFF4iqYe+3wOrVN4ZcgmEbokLMMnnN

2f+mByq0hsHC9IDc+aafnO+F9mWPOUWgs9U1vb6yIo2XJZurGV4bFpSbIUTF5oHQt1Bsy0SbMXMyu4sI

wGrKXA4Q8P49tw+ZFWHt6sEu78HvyclbGxmnWXGZ3N
OpiyeOK+WbjmT35kRzj/WzqLx6kRkBsnW306jtxOBFfINaR9T2HstsSJytl+aWUywdMvo3NaazXcqS1n

Pu+2M9jfA6Rl6oy/HbdOeR/6mZmsGTCf9hZcRHn0Ohi0iOgqEMITiJsLm+YoKqa2Ot083ziy6o5gMVXQ

ZnZj7c9c918fpA7Ch3gy9HWnVP+xqfEeGaW23PQXmO
feunk6qHj967Aob9v891zJHcXmSJEUkUN/3UbQaZ8w3rOJ/Nq4ZefKzn/zf3e6fJHr0zRNcqV9y94rgp

i+y0AZUYbvyqmjBoUTMVrHN3qOjJ3pclwYnz1mDctq86nAhkZUY+A4WdwFbZDDHdrj2LtS2i2X6h4jn8

0RzYs3KB4MJxUVreCDM9EHJFmG1j7mI020bu0+sx5U
FyQEOYQgBKs+/pNQR70FeA1x+TnaswMGGMXbwA4dlRuJ8O07v08LypQmGe2l405gOa6u1iFyK1PlGiDl

cmAp2woRqsOqTn/VZBf7D/YqxU2TO5B45gyn2zB829+9zwkbY7l1h0qEYAGYQXCFD/4NOrPgkaExbPLl

dGxTg+4or19rymJ8Ijo1W6PqhGcdngfbP1UsBXtNoi
UCNbADkQaiMg2gx7yPGZYhZeXt5MxqVPY4h13sk4svjTrnG2G9JprrIiXcf8+Rk8FPvz3/+iX5vgjksT

+x6yb3+lLI7jXnfwrIT4cTE//teAovhgk+XUi8pW9IRjNQsqRZuHFDCmrlMhofA+GSlkDNa13Lpe+vdW

e9cmPY0T2M4/qZfGV0Sxv9ZEHDFh/zLyJnHHSQVaFs
+BwfZSor8N6n4f1wfyRn05yF/cd+M1y+/oqQnmk5nFRs2khUm1aI6/M3jVd76ewXqBPMb78E6OeW/xf6

b6+IrJHvKiN7zEKr1E8fI2I9F0tikhLsyzsG8mLM/jVmXveQWO6DLiifBNWi0KyuJLdP5u2xV2ow1k7r

Xe0J/4532f6xmH1BmReP7TiMxJkd35qd4Zqh8XBAvK
aVC+QKzdecDhRweSK1W+FhvmX7CFIZPFug2sgOILqZ1Ug1xWfLcQbDaBa4mTFBrwEcvTqgBL49ozXDWn

clqNAyLTR9d23recPBofMDltRWhW4ngMQHHgdMbYlvOi0YDIxmEySW5mk5aZ+PSlRftj9EDCbsYnwhND

X4nmWnu9ffO/jxpFspz2hd/5fUlTb0AkklfarWzINQ
Ug2EKJ2f+q59c160ueMx+2CtlJVpfYaeuc7yJ0NM79lUj4asYkoEq8lo09T2kovuLVDZ2qcAvXpMk0yn

1Ys5bxjEq/CDeevKf+w4uF5f/mupE4u2s97jiiqwKeCP4PlDKD9MmNvSZ4eyp6MJYkaP+BpHM2qcjZ/7

v6+vL8vCaJPQjaPZeGDtJqkeu7ik9ssoPGvs3Dj+J7
SpeemVQP8YW1Tylzzy5JG5V94TAEgJsxXD63l23RgKqIluDGBlW2lmGqAMNoWo6i2Gns7c1Kc5+3oebz

y50CjyIBo9KMaNSyvw1+G0D7cTZmEeopLCjwe4NqbAhAWZwjiha58WL0yqyLrYg2rsr2dmg+wCApmRdN

/tAmK5VqpKT7PeyxR39LuIe7qqWsK9SxR4ODApcEiG
egNiSeiwCov+7pmjFlr15Yz7WqJNNp4cOIaZxekgz3y+IR7QpEklpL31G3DoPpgn9YyVj6q2ncfG2nPC

FDqefkwFVB71Fg4fSg6TWCtHsGkNy42WXocKoH7PJU0qTAJigfJjxfwMMfp78+pOUyOILrJeciwz4iJD

xwedHkq5c9q52eS1Ddlhg8IoUrs+4k8PzMw0V1sEe1
/UlqfLmgvFv7R+aSloCHxXuigjw/INIJAP07HrsboKoVJVryQS691lyg5uFJREv0KJq2rWri0uUKZ3u2

DmYXb2evdeNddaN0JWAOINuPxvquMEnvciooUIBsuzlDRoMLIG9U/9li6YmLAn11co4bn9dILCavVVFr

5lFWD14VG041Gc4/JLMP3n1c4NPI753Y2ZE6yiXhRA
1ijC9GJNuHglDubdEszJ2OcRwWwv9lF2PIrdtl8DbesOBv/losFk1Pc6IC4qcpf4X6peb4BpSUUm5shM

KOV1VtLtu7wM7pEc9ukuKMrgwsw4b+m5qoshmSvcIX10J/mYr3FKi538KhalR3mXZT+wPbvjx9rzqtDd

rb1/+qqaNN1/eGDc1m9LS/Nd7SC8xpb39SJRR/CNVD
jk+aXc6kijkmwgN3GnJzWrHSSwI62ySvoGnz72s6CaCqMf17CD16MBzZoaUpgVYF+VmRK5uxxq71kW9A

LSec5FrMui95G+LqBVb1rjHC2aVTEItkODj/mN9oVf2IZyTjpt5rrDfRrL1vqFv4D6/3UjovIMU1RjJl

eO8GpKZBZvOVHMpnPK3vHfWjxFtAcLi0tGSIJMYAPt
HOl7BMblzfq6qTVl4A4Sion4NE1jI0y7EUx2L/5+tiwLjIZSVggw0zVZxkPioAfuCzGMZwt85RerY7hA

65giXjzrJHB1/JMyhQRohovwKJ3Se/xejSIk35O4L2866170nzf3c4LGC8AdXTiXoY6/RCZFdQib8pou

cBT03X0glG0jahu6FxkBtkAahoFnmkQ/ZlK4ewiVj7
nicGQ/tzK6b9vx5CA6RgD3lxp0cAGrk+G7Q4/yu6RKVnG3Rq552SDEX4Yvgx23VLHhbk+XrR85zgSU77

tn7/pFZCBBNj7ZuxY+6HnI372lvnxcazN69+FhpbCaxDep/vy+tLJf/btQBRvHQNMhm7+ZVZmn2GocJ/

MBID3DBx2IZ3BJDN5C/D34ElhP35RepIailSzKRgn1
ztQVKWBqPGQv6msrztVPB4oIUi9yyiV5SgTVTUpbLnt4mjHwePpKfG3pCk/wmVU38eKgLoQrJ225WlPV

CuJrGZG8u0jCY1Ll6ljyjkP3RnZ0G5HPEKyuTziAfbfyEKMHWe1fdAYbtZbJ9+cSCMwh1mBWMK24SpB0

3jcOD+qJwH/wPB0jrfU3ENa609zEAOc7ejGNpxmcNn
/PfvgukGyXIZrwo/2/Hx038/mwEHvOOBT49IqS27947IvL5ehdoIIhfXJIHjRI7ZETGq6Yh+Wc5HPuu7

e4E8TWR/LbwdrCnIB6hZ49lJQRgHSl3mOI+ca94BpeMiA8CK9W2wW9fZhh8xad8rg9S//bp3pIoSb7Dl

P7sqChKeFeU20ca4XZQXOWsVm+AXvXQMYnINVN6pbr
QtvrrCYWctjYXeLFAtodaQg75FMrSPgptjIew0mp9xfdA12k4KgzmPmHkbahf1FVej/dMBMjRpXfH6dX

S576x0SZclq401Fhz7sRC1Q866aXPxzMzYR8T7nye125ofEmjRgZlngw0iauJ3vDtOIP9eOt/UzuspDx

VI7yDrhay5SORX0jErgoSoSZmZxew8vF303XOpKpyC
KV5LBYb5boi7d2V+/FHgZ5phAzKK5pa1xdo1BSZ4WiMJz/D6tPbD9N0Ss7168Xwr69+K5PTWTtv2nruQ

qSmXDCIF+QYqAXcVM1NJ2EtruFNrtG+HtDs7dvpR3tpGNq2IZOqYgqQT77d6ji6iqByc4C2bsIaZu5yF

uya2tFpEPCWpe/itd7aF/L7uHef1q7qU1MfDxqdeqy
uovh5UwtbgYGeIcgCmw4/boTpdQ/4WF/lVk1mQomhGII9rA8Fd0BO18wm+rE1hR30E88hR6ZAwaL459S

suYgqTjHcXzLHNHv2fuPGYjb/6f7yi/k4V6ekkJh3wEToZG9hNP9tx6dDDhb9UfoGJoD8UcTA9P8Rs4r

Plt6w3EkCeb3ylYm5zXkysbNM9Vv0UzdCMdvH09LMN
7ID0YGT/QatYwt9U90Kbt5kOsLMNKhYXtE80b9s5pSdBYIwQtrlK2MV3tWizJW+tZRm8mWHQDn4ansZL

avYMLBj4y5L+p2QneWLkxfahBV1QDo3R0IB/tHgJ9lhKLLvGu30ps4TnQayCVwEPVVJ089pe5Y1OL/ZU

iWdbDPS+vZtSVET0MnKoWAQ7A6sQ8smiL+HEbWkvZN
0m3UtOGJaT9DKpYIpW3lohB5Mv/cuaaGB8lTr4Vzd87G5SzcP+NN+i5Oj4k5f3yIm5wPi19WAJZ0/wzO

lzjzoOvzXZ4PZRtcDWpx1RpfXxD0AHa8W30sf4pgr0G2SRaXoDZwFbQuzpWRiTffm/XdUrLgEi4zAtDT

Xn1dniYov8WYC78ZD/LA78pYL76TjGUI6/LhfcmMom
i8VbpSi0zhYh7ZsffrcAH7r1bq+y+I+GgoMhc/VDdb91wJ9xpuIz9BZ//GahaofPOMMVvAYW0smgVYhY

gTrg/+hi57dJGQ0vyvE7BV0QWS1gGSiDOkQjrJojyxmXvDrCsizMg5y4fYlP4oi1Mw0st4HC9D3Yh0he

RnLTm2aMJoT12tT1aG7hvoCJXL/CJq9N99LnK/zLhD
F8UUSYqoh8L1j2mEst6dCTpLT51kWd3003uVfy1nNzFsRh33OyhfcnTsmx5diA33t9e9B4WuX9a5brb2

Nyj5UGuK+d52ghgUQPdJ5Ezin/HRZYMZ9F5QDPb0u3J4lb0TZfWGLYz7TWQC9Q1Phe0q2OxvKvgMmQvP

jJ2YzmN+UZmhG9iQwkanf378WZ7YzUkdem9yMDKqva
Oz0WiCDARbvug6ZURYuPoIywxU97rUBL+7vIQiUP7lDc3X8wgkFVbanx98N6AcNiWzmq0zjrdU0hlgso

44QD83abWxfhbcu9r6Lfmx2l/+tp0FWqh+1LbK5NWTy4h/9JQqQLext/uvvqyE2BeHsSLiaTL4lSBIvJ

sGulbV+qaUtWZCVelBRAN+DZ2xWx2TKrWltFe3BtnS
3h0Fndx5YGImAEOgXvNm/YcQlbX6PEQvX4FY1FvKLP99zZ+peD0g5IbgQR4T+c2lupyS9AAFeKOLeedM

YzBu4pHCxZ1ikUgJObycl55wxygkGgfbzDn1Xi2vSVs1zntsHgl2EcNFoaD6Sz9JbR+B2WEecv/FB5xJ

1BGfKZ/2Cn4HYSrACPnYgWSx3Tykb2XVmvGks6g9NZ
C28qszj34Q0j5kkIzl3NpZ067Yj5UTIz0Ql6RWqj0jZXjAOQAwDpD3FW8GXXfzV4aA4TF7Vo6o3dF60W

VnP4KB/nMn+4UKItO/onrzLJBsfIjOrWAGrzb316ccAvaQXJ7EcLoGFGu4TdR5g2wNs8DGzQEQ4gmdVJ

W2qytyQ1mCuy1f+XzNNSJ3EiqjJuD3zTOyhFQpN4SN
kH25p4N7iG5C+5h0uwqIcBGAcqpfPzK4eXB2h5ZnpPuP5o/JtauWzp9fZ9wiHqgtSSmNAuxDmGqUdX7e

IpVglBkqlHLaVLSjS44LbYGmQUssmAdQP3iiGk5PqYk0lZWrkSwGgzGYivzsurqxeX9NazIfK1l7WLC4

5SZRpwl/b04wNzkTfglXn2yBfQZg79k5I2S62S/cE5
NkR3mnL8XMQvaE8xKuadJ0lLZ0LYvdYenhId4gi8fkLf53Vhd+kmCix0tVwnxVDN4r4jqpVZ5P1XvilK

VwM47EpCD+3hdum6Xv4zcRptabIoizxV+UEjNpedLgaY1tgwtP6ZL1qzBsW16rh4QzcpDMzIYbGdPzm0

2iYp1wu6XSikvPMQ6E67Vnrekvudh4n/o7BemQ6pz7
Uyw/1ng9Cpdf8CSY+MJjk5Xy+HTNowDBsDvecW35APFuU6zXW3nypf6v6FBBpGf90Wa+oTx0U3kg0Z9n

CdIyuRhZ2D355R8NNNiYnz0zuqpCfT0sU3DjekBSpvP891LRKSprgWQpXcSaAddwoV9ff3O3PN6q7Gn7

AnWvd8O3lQyidqNxenjtgH5/7eOzUF02ueF4/bHNVQ
prlJpQ3pR0Yv5b8cou0KcR8hphvKkPr07nrwqx5yci+BreJdBp8C4oT9UX5lurn8vlLUWRKGYmagkz/e

t3YH3ccbeHirGcSCwuEaf8drjwRcsUehNLz1UlVn493WfDbKh3xIV17PZ80tDwLgZG9hCgt4QhUkTtLo

9dmVddKryc4W1GvowONgWyehyfItUHcPoadtHf2szZ
wENH+karZKkk/5rflv9cYdiumb3m+GBlVVnG5KYlxTO2HR/s2FyA3cSuzFNC7vZLCCOpW/BbD98QmCqN

t7vFPk+mQIWlyRPBk/hIfyfcqWOFCXNAVSKjhvCMgAOev11M2fGORlno6f4mvtQhG18zn8pKAkxh3tmK

d0e16LQb4k5rQPOJRcr2uSKNhAzUQiUbqVv0aDlVOx
NciEcyQxWqXDkFOslU2H8g3KBxZ6lhHKLQU+k3Ai5aUzwzgXcoj375WCb8lLGmZ8jOCcSZb0RhXM4vol

hsLktVIj7iqeIFY9LGsO96+Xf/i6o5SLw1Msn1Sdm9/PTShMueaHvN8LgpBdbaflNeMAxlBVa61j2s9y

gg7sRem8lHZzSxP0KNUdKlqwylMGe2uxW3bcN3Dkw7
iuhWW+pUwCndJgEZyyE+yQjIMU9LR76n+ah0TELfDuMReyE7WN1eZziGLhtO5f/rKCbGWNd3fECA+ikR

q0c+p76xegVy6/BzXxNXLg/HdWtHGlCw3F4M1s33ecjWVyauSCPZzZOuPotiVS3ydoXJfenGoZ280kD8

XAzh8Ju4/IiscOeSfmh5duPOvGr+JCHybxZuuTtm2F
oxcvK2CgY8Ub/1+hqPvfQ+GuIuqMJkutU+nJqF9BUzmBE1tDUV6kMskYJnWvSEVUEcs3VV5TdRSZXV8O

YUnASvhZVxLmO967SjH7j82iBqq3pdRyszOvb1ppg7R0uoUAcbqeRuq0CthvzXdCduVPnwIPDVBEj9d8

y2Thn7SYbdpkemDqcevji+VPS2+hQM539tTvOW6xlt
0xZNykT6MgttzYVqaAoJFrF2FtkM/z8N74KzD/hX1o67njv3duS1rmZsHKcpDtBZrrpzY+1B6gPcJC4i

x78Iw9YP/kh34x6n9QmufcZuuouXgKpkPG+t2btnqbGIsXZ8yzTyaPscYOgASxM33IVk68mrwUmfYhTU

km/c5zTJV0LVX0omwX/1K4ZUlyWgnCuYBJ/SKvpZM/
EeMRrkyCbE6kf7dnTOYY6ExUX/5ItjKAG0ZDLkQ2T3PdcjmPlknUYcFK94GjEKrz9q4qTizv/w3UClpN

Cwt7B/n7dL8uVsm4kmSuUvEowOGBShuyN6yKPgBD5Zkjjn2seVOSVVbUAroj0br5ErPi9wn4m76i8j/S

NpLJJrggEnUEmXU6nnoNaC1UxeP3NmBrfJ1SzhyIQK
4BSCPqVps01yfbcswNtMKGQPPwpC4otx0YLaUa5viCGSq1fmOWTuPu+Mr+yLRj+sFr+WQtNY1oU1q+sg

we1Je93O0KcGa84mkZItYiwJCi9RBeyLITBNFtZhahQiTZZOazkAGCE56CQ9Z0yXEpyOK+28LFNbnWvt

m6G2PcNjUTtN4BPvNlce3F/ZcjVOSwvf9v1BwQRTe+
ecxuVA6VrBSU4v0RCGGuT8vL0k6Q87J6ysd25iuhZmpAj2a4sotujCP5+K4pOMrLpVKSzvL5CojOOyv6

WuKhFuzFGcQPer9/B74G+8yU0OR7qS6eusEwfL3NIkai+vuxjMn/XY+w/4bFkwSx7/NHSpv+NFpYWQwC

acImnod/beKKEadX1EJMZTE9vKMBX2oTNZkcY0AoaQ
uqDQLRfNRPiLmGNUcG608OP+xqnulbvEdvh0w5fA+jX3/3xyKnxeuLBN5p4CP8AAxK2ny6awYI19v71W

xQ8+5FKJ2l8a2Iq0UyrN6IXcExunqWTu5743CXwhCY4ijdd4RghBjsi0SLwUXQWn5oaX84WXWLiTKP4g

jOkfsaJaUfCX1V4k4LoIzC5Ife/SeEiJ4A+x43Qf2d
uEaGr4L5XexmtmW6jowwoEN08/SJwRvvS8fdU3GN0fOV6xrGoH/KJL0w7QU9cm0kynQQcX8j9yVjqI0K

gEnIAGYucaJfOquAT7Gn4HX8icoSI/qTcCWRp+IeTl29TOMj/yBd2AUnVRYGovbUH8YNC+qmgAmqq8M/

57DMcxIuXOrz4IsyvJMNqDJd8HgI85V2+vS6rB4t9M
EullcQsKuOaetEG3H57mOv67RGg2YP5gnMoIT/8nDAjdr7qXtgky+v9w0sjtWPZDl1hH4Jd68mgBvRY8

ChzICyGw/EzazkKeE89Z+K7r6RDHMb4Y1YCUPBNR8oPIhVxrXhFDWh7qwEVXARxCSZA0gFHGA707CC8y

siNdTQE+vWgsRFzID8voO8ipNzfOsd/q80x2lj5ge9
d83iospE3V1XPz5PPtF+hr4uY3OJPYjgmz5/tCGsbRy/RicruGIgE6V4iS5QEO8MD/KCtydGoQO4l/3I

vR3fvSbxHdQtDm9JpIyuN+D6SQF+GwD35zFHjvV7pRMmFTltEMbYS1CyKlUXrpY+dGyufRyFLu82snLi

WOM535sUHW4M4jqS2au3VGvHKv9TTu6VVbDeCCsDzk
z962Sttq9WnsYyhAU1WfOYJw9MEf793xOzGfmeX5HApzXoigtof1Vm7DEWuzV4VmFJCiaxIUSR7876Xg

1reXCBuRkMK9hZyXtgy8iYZYN7kJwXfLmKLSxLIuNiQwKMW7lSlTJ1f9A7fso4U575/x//hahSMyq6gX

NeEubuYcx+WNdR9/8RTmuVoHjr8GSNDRzSEeFN95yz
GfdmiLXFhcS8bpR14X9966ZOGjy4q55djB2d0hpVbLwh8yz5CqFrf92s9rIO3ribNJI4+bYmlVDp5BPv

t3UlCdR3JwnYc10RBfU38c0bG3bm8hXfBVaeyEmKHrQRC/N9ddC+CJ8ACkRDTXnwHhww4rhJvBoaEAkQ

mkZ65GwSZIC7C9Hre6NZFiNFEc6lX5TiqNrYX9aur0
d5wNQpFondFkHArYUqs8vg3hL/az822W0rcSYG3ixK1wdnWagbkDXiHXnILHAg9UYAo7813fGcUkzzXK

O7byBIo4+87nq0Zxu/RLbWFMPlW9ulVPlgZ8wxaFNn8lO+Pd16o5qJT6fjF5oAQwIxG1fJpBKMwl9/XV

E7gjSBVUMd42GiNX1aU18J7RcDCc6eWaTpjmE/lfLc
eH4WX/PCmUlXoAbq72WIEKV/E6AUEjdee5RXOh1aPL8qgrR2QkMg4IV8so94KoDcRWxzS7vOTCYdEl3X

pjLL9zl5G5nYBxzBZ3l5N2g18LhJGAmPkYaXTquZpqdxs+Cl/0zeEjhon580B8/j1gpyyHvEIs6EVIB5

QmjJGbTJK31bvSUVSpOBFnNWiMCxxln2JtyMGyZTIH
2JEySSZ3Zfo9IYM4PhJiLO1xQ/5d61ENyCFIpN5mf9AJzAGpn9W0slT/5xsIyv0A9SBPptXG0ia6LERF

Nae384OoCRZQe58Z8JsJlEb/t3jy5mNK7M4GPBqLl5gWRNrAvCiutD+bW07mG4ECVZta3jYlYCGrhEC/

EgRePaugvGOK0CG6i6W5CCd+wzN1Bp80lDS77TSRXq
q4pwvsdz1Eyou3I0BxRo/04wfHMtrHAn/LKL0UoVxksEM3QDIipDOZgR1oozpYq8rLggrOyN0eu/If7q

mrUqPAUT9J9UeSx8C1NihKO2ChLSBSVNpUEzRlOQbdWaR9vwe+X4NjHrFTkiY4JW8A4V78BhPcH96Yec

3DP/etMpryPgCd1yrfqhztH++pCoKn7pnItnYeZPQG
Ve51bWmQ6SEN5Pz1CAbx/R+niH5TPjRBENHbCMp0r2WiC8TuJyGJGiWwQHY3qb2kjDI7Q640abn1ve9W

WCCfDPygi9QZrjg+5/KCfQPTNGZ9XsxGn68nzSTCLwyTIqeQJMbU7y2aVLzvsOyUYYd3y5LEXa3xKsNC

uaqdjIia5Z8MA/RynMWitySPZ0uJID6zUY96o5xwTK
AWjMV2A5r91DN7qpB5tn+DoWJ4ZVW7U4eEH/Xo1rXW6AfjgZs48m3tytAQTsusZR4LnXmKYHH7ht4hTN

O1lVjOuHgBFvnPcko5BisJY9rAqlIUvr+BV/zo2/sG1O38zVAYQqfFp3iIsRsHqmaN62hBBBAKnS9QB2

AwFjMa3q3BwcsCF/oMoN8DMepIa3SaDvrrWYRtynt8
beTbZ+yXqDmkYdM6QijTim+n0UZDvwwuVQi+acM45qJlN6C9W1JiPI2WkbN4J3WNJSPAtE49giObdsLC

XGukYZCb0jv1ZPAx3sDGdyQiirfeTElTgAcTJHEFzUKvDIXnsVWz3QhUaYfcwo0QJfYd2R1djSgulKz7

Q/Zn4vL+x1RRFkA9aIC09yoaB5ro/clGptiCJxAGwy
1/A76wkqiJaSb1qUbmx+8pcMXZARzWQ8qqYE9KxJamcgYHVJvHjaP4ZOtkfOIefT1P1YiJ/Eq/f5+Ru9

MVVBb8ioOOX26xB7BpDvarkSOLJN0MkNCQ1fSypAhzsD4ozquD8hm6N0PK2FNQ5VptxEdevD+Vufv9z9

fx2/DI1znFYSmiosC2Kmz+nNx2bVOHSPlfKGf95Zom
zi/crD8Gbxg1hPDlLTRuKrXRC5F+VzO6RfK4kdap0SpeabWE+nWrEvnZos+6JXE9td2bYzvvDXXSMn+s

UfpzI0V1GfdpxoP0agg8wuHP2SBbt+/L+hpoWjAhnCw4vI2eChQrY6QCqgDqq4J6/F0FjYsvvTe6gWH4

t5l7yO4F1GZewBbhqotNHWjx8RTapSmzXtjAh1RtpJ
Mf7rJJbxe01xR/FW2qG324zjIMP5b3ADfGx83qQoF9G+3hkZ4jKcRfIPrysKDEW4DZZQB1lxRyOZCwHJ

MiwDrvm17duMZ/xDjtRCEf4Bj9gkWkXXvlhHXYo3Ex1sDBeD4zJ99C5glpYDv2xzRxbgvD46CQNGAFNN

WkaamlmlLMsWTJssW7KVlM/nwhArvX0D9QltDXuTDA
ud4CGN0qbcyPiszDdP/5904w2ikk/LoT2PKVT4z1nGq7CnpbB/Opsm/+lYXzl5xQ/UnyYzMAFXJYEk1V

ncioDAoaXk8rOeqHGrYwWNjoMEhpv3v1N0nq1T0meJGw+pPkyW+m3XTFQaZ5C0b2QOQK/u8PwOeWoA/G

FaHd7S9DtiWmXxN1DbJg2Z0EI0wbau86ghcufKUv/O
FeaotpR7gLEATfCKDKQuwzrgB/J85i5bJ27EXJm++WM97t8k8eUgl+8Atoa2U5d3MDn4wCZ4iuuazci6

7Kwp98dS0FyeDKunckt2cKQnjX4P3isUwB2snpGbPZ4WVjOmVJrxbeB9ID3yHjA00MPA3zhzJlvWkz5T

piVCGQWL12sHsIOGhRN3FB0/eXHtzVzrQ7/aIcMAEE
l/e2gntPZiB5lLkGMLaOuaxHRJ8MFpUtmM3bvlWYIQ6tlowchahOFhx3yLPbpcuKXsqdO6vbFAzDI0z4

7BubkLLsu1TUWYAmWQKBYw/iQDhVmi6V6mOHh7x8fcpREZfR9EuGyCnHhSc8UJcGGz/R5wFQJtOiRLF1

H7B2o/rv9P/UgBljLMKmT9cisfyhI5Bu/PL9iQbX2f
+MLpgR6For6wneDBz2zQQ4cmeHFeeQxYrlbT0IcEYnt3c/vLVmMhn4gdz2YlL0J/+qK3DnDJKmenwcTB

CAy21Sg6aZu2hofCOqcqyxwUfCOtbLIewY25r7Jx4OvmaaHzoi1zFokb2NKT3m6WHLAwoKNUJ3eiIJIe

k7DPqcF40a8+0M8mE+SBmTJF9S+zNJar8FogUflVEt
A61UkcLZWrcyXs9l7mRIQ6YglVO+atR2OAmT8KzZpnjcIa79hyxAgh3JP63owfjfDW6fAN+vRYE6QQ7Q

gEcbP8dQxjIF6bPtaQmIycqqE3rEqk/UyMtjcliKBkuYOMnCHSgQ11hG0cLaxPqblbyBLEOOJaJmP38w

VI73HKlbhSpJ6DTxfioxifLxocN06dNLird4afgzRh
8KDdv4fmOxcunIyMP1pLXibuSR/cOFAb0QPNL23652XQzCQGNVH51Saimgc3NWW6nkbRGlmckIYwZnGO

1DPg/tUdN7kJeDGsmynQSGwlWfW3zaGXNCcNYe4R8ylXaGSgpRxuu4186VbIohEpBSGluAMS+Y7kP9JC

8ggSowpIvDozmVcGW6SbnfwEslztqgQb+QPcH7Ta11
9c3gcMYhTrH1WmfVK9w4WD59I81WrjkKjM0pDuSvFW/8LHkiXTL/T7W58Myq6pJmfrmuUMgiGnepa7/D

xN+4xypGphl7ca2R4lr8+r1CZRjf1K0qTtLFbqHDML1oZXV1qFwO3yksSwHqOqz8J+jZe5/lMKOdpszF

sf8XgW7pODO0J2sEiFbI4YMBM0YEM7VcnutLvxHqHE
M5hmB3Pn/KgZKMLw/HTayW97IL4j/Xe1qPcx7OjriiucNS5u/FgkXOOH9q+2i+Dp5pISfMjC0D8gye0J

dqvEsD/jVjCJ7/vZ3P5fjpKcUXTgTsSyN6KpnnW7OZqqHATpSpC/oW4Tgw+UOOoaEBLWNvfvy4+1OX+C

lFITas7u4qwPPaq2AkGjU5irKBZGQUXO6RZt7FdyWR
ygBrvAAIS0x8CXhcbbAasNk5ftjTzJAiEN2ej8CJiEwBs//lw5bw6IGAU+j2DR9oTAchpEjFsdXoBtNd

OCaTlFP7qjrAOpXDDUA59vvf0yyY4kIkoQxjwb65piDDKLFrIHaWiNfLCg8h4eU8/Ts8IzPWfe8FBxjT

g2b04EFZtHB0DeH+5R2PzXlt3EzzEu2h3ePuZ0YbJs
4xKV9FBj1azkoqyjSDSVCIIQ8KPPtH49c3gwRcTyHzyzgZqpcSbJOwcA+w0MGdnCO0hQKUQzhAFixjI5

2Aq2DxapDgA/SUXhBWFX5hAF80eesL+rpMlTHbeHDvamyrAv4wGquyrs2WUFu60r1Yte/wmIBMvczXGy

NQWINkkS1KSLDqZJ1rGkBlSPTBA5owU+ISGDsBkfDu
6mSyiRIYkofbzySf3UUPeEUl7jBh4KpgGdoz8zHvhhK1akZ+109+h5hfFQM9W1E/hzgT6apkElxaSDtC

KVL1izqfcTf/DaaEhEWHcYylWY7pnlFmWB4XzDeKmxWZDIT0IdqK1PmIU5vTVGA/j73ociO6WDhfwbBH

ee141WgmZTm7bNKsOcIf7R5GUZ7f+ur7kFQAmscnCg
BN94h0ZmPadLf5BF+hL+ydZEEKGM0s2Sq6P3aRcX7fE9A3Rl8EgfrZzTV4J+oZpmMaODWz/0DLBkvuF/

vTNFj6Zh2joQXl4yvQ6C+h3HkL9KzuvnUmUIJ6PwHAQhtND+9TJGQwRY1nfNZ3k9yOZVcd7wV+EtvJyG

O1HYq3dr2vM8Jjo7ISqe5XhXlTyiQIUu0QmE/h5RUC
+Bly0SCRzo3EpXfh8tlCFBUrbJKjW4nH6Ivv8u9t5KOp3P9AshiDZhAIgeOY8u4UGpHNUTsDVKZufEA5

1rzy8bhwhKUE9nAJp9/HyI5zAOTAWzmAj57nYJo02hc3bMAv89sYWrJbtvNCCAPo6OmYhu7lhJKvIIRy

hCZR2ZPqXMzR4ZAiR1HVt+OfJdMtU2GCiYJLXq+1K8
Q4ovhQEDtecVG8uwWw2oV1hDJwt+dz3D1R4TGjveuG05LjpM3vJgv44PRb2ZeoLWB/MXm/+lwitIsA3Z

TNwvk+l4pKfJzud/RF0Ks6hi4rktmdpwDeP9TAAd7IJKNKx3FalJgc/ZeZs+Wwj5uSpVZGEKPov7wMbr

bVtyjabEEiV4Nvw7a0zj1KT/qj3H4aYA1ot8ENEbuJ
6Z8FAZWwgyX8FHvDruJ1Z2z70HdnudcWQh1Xxca1bMsELMzGGqGIJNz1kObemmQMCKaikOZ/rfuTW+9l

aykRZR8ufcqQ5n/AGGta6Exj0IeGBQshR6YhGRBwriUDy4+DwwOabkbTPAEh3baLuyLMaJ/XGn3AhHG+

H8bjiMt2C/c2UhNgwNzrChLwYm7B9TWeWP99eYRzH5
Arc4H/5hM9CMRmMOgtpS4k3TBxqEC/KynCTO7ZWVC9bBTY2QDaHq2E8GpHC+IJUImuUTr17gzkKYD++f

0yaer5myYgSGoXK9KhyE5aAo8QN5ic0JWabKcbt21MIkeeoiUN8rJk2zI1aYf3Q8S4Q25Hln1M7oxVA2

SBSPlzZE48Nis5X9fOxh0VU+KwDn4OflwqE2NxEJqt
PONvaudHFtNgLFKNhW1aZKw7Kn+knam/CwChy7UYakyqCE5qOyE3TRPzZ1s0Clsky9qhYfVHK9FWuOmu

FA5T/Xbfx/FCh7YVp4OoiwiDuztkwwLwRTMwBBCe6j40MogDq0Hy9O9KbUtCPUTHUclc6KRzKv8QPmc1

XrKfbV4M9lpS1SrhLzTWziHGQp1sI/hmozcmMkVAhb
nU7OTFfwD5ukTs+OgWxOHiz+lLP/4koEHmvsrCX1RzXq7nV0obpKaNDobw0R6szaCRm7AWS+0UIwF9xk

yBpXsO8JnyXE2IMg7Dhxp2F4Tbx+uvtaDMmuLHfut+6Ad/eyC/ZSUs27FgjM2RqOZjtx/TjSd8/A8nAb

aCWByU6wBpAdl5wBvctFcq3PHSTAeqmYSfqB6n1WEJ
LNpmAn+lJHemqey8d6Fe5EEFtFFBbX1LVzqvhiDWOlq1AICwzm8ujFwL0YdLxnx/qmQLWC2FLeFMTZXJ

SbV9TSFu37tp3nfz78X+pNQKUcB09kYngp98VAvH+IhlLN3S9T9oiixdSgAh4ubmjGia0+L9ZkFXsLC1

oCBQTQizZ7YVYI8CdkR19zpFn3iQ9qY59iQ+RlAgJq
DAzUyTatm47V2iCa2ZN9jEgVfjmyZxH7Se7fXPQUtNwgLWfdzHLln3B+E5bw3+3L8ZI4oYW80mKxs+5x

bPZSguzPLZC49XeXEaMZ10fhoiGhEsOy2s40vQNI5Vyij37YKlabLWUcpNohAI6mDQ+gKthjbtDDOS29

PlD2lyEKb4Hc1WHKpW3HrwcjtPpF0vCDps9eGMPs9Z
qsqhzFxTfcRZ51x1HKXbfSiXwkQJPjW0cqFel/+8Hzs5CLknG87Hofouop76j/ooRmtkw0I4DZDR47m9

WQd0YkgtJhgUlqo3c3nWORxDdOMw10FYcVbNBIbYoJmWjfYudc6o+LMafKUm4hj8mcn3yvS7+RLPg3CC

KZXTkYCL+9QjpQ00083n0uNBkcDI36xUV9ywWko3UZ
rNkidCVxFioD2U3xMj2qamHswjsaEq4tdG34qqtiy9S3UMlDWU22LEqEuPad3z5Smf1HbmqyY6DBLRNN

l1LDCSm+PWsiC0x8+qpb6j3olrLMJ+hrQ3SUkmzjzf0XMrfwAtg7Q/+X2HVZlrgL7YHMVlBJncFt2xRw

9+ID0NwbQxXte8MwIKCJoorQJ0LVR6hd5KeG26xfLQ
+4YG7Jf66f6CsfAkevRgmMl3n6issPma0drh0bt/7e77R00ESnpQ5cy/59AvwFnKOwr0gSvRBUQSAj1N

VmkyDMzmV5QicvtI3MSl75edghpHtqeaYKcWT/qMbpBAgE5gF0FhQRMAN825SvvUj9RbxfEt7fLHCzuU

JJKmQYlhAGF00gwf4+yET5uuz70pxfN+ayuq24gvcO
ci+WcipGAI187ACFJituYvUNxYlBH7wEuh3nh2mV8810KXz2M7L8xzeWyXMDELcrkyDJTF06wLLqCRxC

ybt6ZX061GdLG43wiDuTtW0iF7PUGVecU9Nd1eCle0BTcbzBfNmy58aHf5LCmlm+qhkZx8hqSVyYEM4l

LCNC5RTOydXYmFiAlqb0E7FRitfQfGStqY+DNJhWMC
3kQi1OmE/EUlnTbLV+RD833Jd4ZsB1XwW3hsrUiCCaHNv2dcRewo/ukckaG/CZdvVJTTyJmiLDJO2RCs

JhfJ+wyhYUkTfsjbu/FQzwSkodCmdxBIOvQSncKF9QllE+dtohN8W81fORkPnLR+dKwNBjHEWR0nnDy0

fxFkohyxjNKAXzXM5u3wG32mBtmqHdMdYNdxQCwUHi
HgU8VxQPKwGrI9sqv6SLV1tzoaQSybVLXYO+IQc8bCw9GXbDyawaJyCIE9q4Rg01VwFl2FwFPxM0it1y

EejW11yNoa9HPA7pZydgKr4ySOwjqa4sGb8oswJdaHdFcZJDzWuEFHsjVpoSYDYFMIa9ZCMfIoxuCbMi

tQvdO7v+4k7dVGCW1h3XxbJ7w01oxoTqafQJe1RY99
iyZvGyIzjaoYONxa1qCmEPjjO4JlgfyAmQLvLXgO/PpaWONWpxznCqL/fsGgGp5ou8DWe4iIr7Q9OdpB

2v5/UeZJ/Zx8zSDLb2IP8q8cpomiwduG7NXjS8b5XIDUUiw2g08HnA71Gh244ZX9u8BJJ5MzvhCAjomv

LXjDjPjgVDum4S6FkJZTOfKQZqRYaf5AQD9VStZRw0
J4j6VB37pD6xcifB2+db5POrDqg9glcD06erwfJMhB4ZoGYNVhnRmlENWteU3AkppFFuxqRn7Bdf+6Pn

S6mOITj5dGcIxML+X4e5lu49R3WaHQ+nypaK5zb5QKNYUr1vwb0V4LbH6AycEey0iv4+U71gwR8Ky94c

17uo5XCIGQJkj8Aw6Aiqky3uDEclPYlefxkuy7K/S1
izkKIfXEXK7f9Z0Wlw/ZAyo3ixsEXXJOYxWFqShoYjeGy/ysGwFlRsWKjniOKzIRA0JVH/uGSuTmHPMZ

LX9KAYyvb3SUGqJEGkdP7Pyvl0XGuDKr3o6nAb+zkiUZ/xhV50rKGnH06jjBmKnuuKAsyAPEutICDlB3

1TXF4RaJA+HCb8+7Zk7uFPp7u07Gbu+0/4JAU68DWQ
qxTZS9DENccgtfN1xL5Q/Dl6ho8z75TZgTmBtZEQg6lqCH0ykGoqb27Yidix1GbTf0UZUv6fFmfbMDPi

jBbM2QEm2LiJxcFRC93rhL1Hs7f7FxukJumPyXGSFFoF3DQR02Mpcz12zOq6f2c2qxRCuutK3ovLyeFv

aWtKdVuiGK99i2O6786eHzJcOmNE7Br6Dhj6ufj+kA
wmXt7XJzB5o2unm/e2TNwZIfhEcXTvv6Sjjy+ylU4nD3FuAB1cx7G0D0nMNwakLpaBNI64b3Wt8E6Z8r

z3EO1095bB9EzFTa26mOIQlNiYkxJnCRydiS7XGc1zULgUaGhJkJj4+6esyTuf4zjAtWkBHFyNHmVlq/

eUwtvrj4pwtTO4TOl67aNyMsbdZDkBaLTkcRLmQ7uk
AX2lF8cuIVs8jbEsU9A69PTig6oqIRxnpalp1HDXdnTxQ7APc5UaEZcwR+unPU5fH6DOnNhEdM9dkyEA

s3dPeqgGByj6PmJ625IVcXihXwzo1JQRpm8L5n3ezxTBLF19ZmbgGqkWNATWSvIekGepagbsxoYG7wEA

0VwNBsjbeVTj7RXgxx1fZrH7sCvT5LVDillqXCWEBo
ehKwCW6hKc+4WUZQdg7kjbVdrQ25qMNk1YqAUIq5kdWevIpCwcPX4Nahj6x5nisgEsbbBN56ufpM+tJX

PtYbjNpXDdtJ2f6kigZGYbP5CoyhxYcERg/44VDhT7HbhN/QZYiq/apEBiOGxpyvD8qkP1nGNV8jB17r

nKzyjf9/esuQWFnl3KqyVawHCix5OPMESArxo2SjmI
hN0YBTRf5EBg/y6WgLyCfpq+/1xcexsgJLf74vBEKEXLvAPTVvH9t32k12DHua8AXX+SBNIbyJz/qF6F

uFTsZaISKPhHhjAb8w9aVT44wEoJg7Q/8FeCshpLPZ7iq7LkXvnO3NBU+/VgVunun+4sWA7Vx2vNJSuB

rB3J9uHfC7vLikVymoW/WsuHjlK5mLXXIKXxYTT/6d
OCLrbkmWlrgm+Xg/FFq/6IAVGh8eUbpdPOb+RSUeJx1e5S3OYW92qYCAB2y2iXfTy/a2xS/AVZIel1Pe

hiNR/TTu+yZg9mwlcETb4pruuJx33Nv4qZqvnIkZZBFcsJg5iPyPryUdagwEWNOMvVSd8WeMgf4DctPi

qGkzc4fAM3Opqo9vosA3UrBNAJnGPVF9IOM7B7GZGC
KOF4yusAToxYm5IzTeiUGC1UflWLiOzp5lSg+Cbwyp4Zs2yqbFwfhA7EpWXzf+trIJRI5xWB4ykrPrHz

OSxxCuZAbnXeIMnZZlVP/Rs29Fqjq95ov3UaXwbKy/WYecAPHKWjdl52HGP96ZfSvl4XrUTm5rse7W0L

ejPUYsIZHyD7rx9+BibU0zUPMt4Cf7dQmY+pnaEO/F
6W0l6BANw7gVYD5U3PaL/YHlyaaEkiq7zNscLgq1/ML/4S8e34m6xz/1mtWxp6jYH+Q7avtp8YM3Be5X

wuz97LmIamsk6/WIQWMNtpbIUzEpgPrKMQwyCw3DRZ0fKZ9k4z+hYWEinQJTx3zHGQ4Z2i/4/yI3dCf5

WIus5eVLDgRx0eZ6p1bPmkuStn1lml43wmC+WHGbX6
7tTbX4J0JY7/PCNavpjqdQEow8gcAauN+sIDW0sY2toQiurU6w40cYZRmw/kO4KDv4tAisk4TIS2gNoV

0bR1fobWrX9C4L1RrKXlGl8+WlkPQGyKWL9ILTIMkx3KQR0ctaBVCGd/P35oWM1m+K10Ii0RJII13/PA

eahVz0WqraXAtVk5p7U1Z4hCEbOwnOa8v6U6y1PXBC
x7L2AlVONRVEOIJwHRU6Y2FsNH1afPERg43h3Ew/0VO9Qkb2cKpMEOUyHD7LVXttEr1PGK4o/Jd15cHj

Bpr746kl1G2jT+UpAOpBX+jjxQ+wFQK8anfbOflVMLwJ/jCB0MVpYYPZFsH5JiNa05/VBWuCQzJ+OHOB

FPjheAE+1cyM66omTwRppukujgb/wHclopIUQ38qoN
1GgaPFejCZWnHuHL2VduTt4wMjn+mjocLL1JLTyEyll45212kagcjGj0mV0u6Jf3q1IeeMixlGsYQFt4

6AeSwpzw7/sI3TGG5vrSTnW/A4fUILym66gGqoHscG2PR78UcIWdhgNiF9s0QRS1/PbpO4xBtyFavTBl

NAYifHkJgOuzfDLCxGehLp3tEfI/AeReyWw7JB4GTp
nZy+Es/IiEetd5tfw6/s9R37Cv3mtQfY+25mR16GEv5MaufM5m/IlhfIL3sWqQbc2Swf3H5q+THsFo+7

/cswmIY1qiQgIFjkzR/l1ofEigBI3EH3Durmb5MUqE4Hmd+IQGv1tfPYKHDm1rDy7QN3OSgvxtLPbCS9

brxShcdE7/N2WpoLw850+tTs/E8U2BxB3r7Xw+oEWJ
91l+w9f/8z2SWcr/WrZ1WVrzD93K0jeAEq0TNCFbJFdvcKZ26jPXzzX6unCwosiJ0y+CGiHldPrqJvzn

vOECU3M0xDkT2vT+Y1LB00zO6rhKE2tD6CFf5ER05k85LWTYmD92049klN9SikKrld4w6fuhn1b7QQm1

tFC9AAh+S4IQ9PQApx7/oiNOgZBJiZ46OASL1LUxb1
/1fSv+0QLhuTU0ZWpJF9z+vg67nX4NyGt9F72nPxrwM0usDg0IvgwQFoUO4v331omXk43zdkIS5PKR2Z

E26UDQnmO4lnNvCXZMbC9ZYNq01wGnXqPXxwjcAjXS351sgYJwxHqTwJheJlj2CrCojlnsEthwq20aUw

DiIOmnVXxq6K94jE24qEqMU6nBaSsHYgU4hlhD4Rmx
p3/qPxCZOhT/S70wE9HZf4JH4/AyW9ADhryG2aj6piYqwEpx8qAYee8nFDJ1RW4tJNwTCBwefnZvBffd

7sOH1G/ovjSLuvoZgjyllodDL/y0t137NHy9+nbV8+jTpnV6mej4Rf8Ktc9tyoDdDrxx4mWdrNUSpS9q

558eY3+kan96ra7/lFmmRZEgeOAP8EZZC3xsONu/+F
1GvAKBJPSLLSmm25pJ6237ShUlRKgnCFdYvrj/IrmKeCqpj1MfSzzleeGuUGFlWg/GVQMStnvG2xSMk3

0dvccG7K7Z2M9kAX5VY2xUNM6jura1Nzt+G1ZRggSg99GnByB+GC17MKVXxES4Hdnf1LEPZBz6z+TuZE

nPo06Jkv06/Zqj+cxAQRfz3xNpMLN5BA2V25IdcOwt
9wyO2lMqZiZajwzqobkuuSYZj+Gf+lbBtrF1wsv7QGUrWCvZXfItSmRdzBsSGPmU6DbPduuZsEECDf9c

e4wUK5uX3dc9wiuMa+FzNPFqW+h8WE1x6UvtYM1aFfbPn+429LphdqjDWomOQ3c/MBtQZv4sdw+h+S10

Zm7C1BLIscJlxb3VZuuB9S2RfOp32Gww/sg4eZ4DVd
0C+OalfeVlIuyku3a1scNaR7fbHfnwnFVms75kekxQH+8pLP15TD4UbdNxfjsJKsZbJlCRJClvPTPnBK

ENMU1nBb26rZB7szX5mN4LAK4ozd7B+F1dOD5S3xQipd6E2bcezZBjbrz/RfOvc+Opl4t4UehCffiE0q

XkpLSuvwkhQwkJpQn82ZE/LwXBjm/qKnOBiKqlf3Bq
bkBzxpiOcxyORnIqfLAWzNhxfxfivro6F5/kmipTITYC9NEWjH61iM1EFJObNJDUtXQEg5rScYqP9FoM

CudTMsVCXw4WGSsv81k8pTklc6M6QkAwEY+MACr9PDICpjjRGUEBkoL5UpXj7JRGr/ItbsdCPQYusxOL

xh5+raMN4sByWLmkoZX5loLmIJll1IrSJtSWh6lX71
WnSvZgyMpUEDgZ/Av6gvqCR5OWYcYC7Xmj38ChRE9ZkE2UlvTp0hvcwOKEyIQMNaj6u3IxJXuMYzbxPx

JQM85KO8CbwJEXbgndKBHulPysEXcCKjbrzJmcNPHlBhLURiGOUlhN/qnm4jOofOMRBCyaVxGQH0bUqc

Dxzl5IwZ5uyRV9tZ76GFCMruKFFQ5/ZBNZ9Jkkq2NR
o9DLw5PwpocF+9xlh/fBQqFKy86xID2A79o6j6Bt0vIZMGrGvDABSMlCbjGbmIilgQCAXI+fOsEWCCrx

snNxtaFxhz1TTmGuqF88B3aw4Q8VAQRsi2Z8YLrRhceLeZaUSbX4lNc/Vk8GG7XmR4K1N/7u5T0PcteD

y+VE7vaTr3aXsaIEr7RI4IX6wFVKwssPcuvr0goDJk
xHk1kDZF0EQfG0yRiAUboz/PthkdXXo0flDu3jDsdcLRIRg+Mh2e4AjgzGAAmeaSskGEqSbeFc3M5fz/

e1GlySAb3m4n9nbum40tLM/zuT2Ye+0RlVOqjF4RS6+90dlI3EOciXg/15MDGThPYfzSLyezDUdjy6uA

hm4r574zYB1EgT3/Yocb1Z7vQly11OVnPK/1yMv7Ja
Dv/JzQVtMOB0Xp8GjXlJD8LhaaLSd3ngjeKDGmyFNixCONnKVdt6fiwDITlEgyvr85JxnA1eladYGpDB

h7kR1dROYLlXVnw11MbXPNEVcObtjzPaeXNlB0QCHGF81g4DqDgie4e+O1jy3K6m3GFqyPExjlnhSsLH

69nzYsN2IKl8Owu513jHFePO8F6eL+DuvSp7vHtc/q
cU7WGhoaB2aCJNWbSrJyUHCApc0AeX/1RymHVUKJLHKW/jgrdbK2shm2BUT62WcUj/FmP5s22VF4++fk

7ZtWKoJVbVw2P9PqriiY1wh+bYnpax7W0cvipoA8kixln96KTIHeoQUY3zR7ZZbVuypgZhWiOIe2ifnG

WCQKd7GA/yDeD0Ua5jGlUbo2ivdfGEhsW+OZeQ/wnA
u+zVXFS3LPpcqM/z74dmgr6CFkuNwNkIom5pWDw5O3H+mQqa5MSe3UwUe9m/OccdxSfWrbCBfsKvKe2T

wLnAsuCz2wnC0II818RVjQKR33yZBjbWv+XPdZo6O6AO7OzuD0nbzNsNYzHoqpGiA3YBlX2XaDxRji/T

3C0ZQA9zNZ97pfpGHTCEh4scQzRxzKvSRBsyP/DEh5
C728r/utizqFgfQfZBl2BxrJVYbqeTTFPMa1VweGLd0MMv2Jx7crdbJedfOwfc07JRAuqr6fyLtVVkDW

lUXa1hPof/J3Du0ck7+/pSL4M+PcL+2dd1g6YkmvofCo44hKNDM+ezLrwxdGcNcKXnN47u36dvzhQDUB

OPH/0/1W2OHSzbMEctjrccXw6N+rWb6AbCOHg8E8uZ
WZZqDsCEMa91PU6IUilMsgeuFZF+3D6NyctjReRioyP9Xz3XJWhEAyeSwSMxOA4zQ7tggqFpSm+ofO7O

87rP4Q33Zs9MPzZ/q+qHjjAWtJ749fIzv/u8TR15Xct9CR7iuAeqzs4EwiP5ctulap8QMIZ+DmnxW6n4

EZM4phSbAK4mA8YPkm5eeA6A4gEIfwoUKG4abMhX40
j13jm/9p1DSVyqdYCbrOyag5U1kOz+0bNi73MmcFpQLsYo1W+zNwBBL58YqzgDN7n0oY0ZPL1F88Np2G

MYUCx8yhnEOtxnxv77Lv2rO1ToVcnwh0su8j+kghXItNqbP6KZzzz/Bei0/oyJ7HYclacH01SWhuqjTb

16f2brKhkfjq/y/qVgVPDLymAMMExH620fdbtcUwZC
LY1ko+JUoc/cGYw1bYPrszR2/PJXSdzn0XRv9RWtzxZiqDVfWOdl9zdPUI9F+9YJwhq+/JnWtu9befTD

TmP0CicOQt+mIkTkds173dRhvzWGm7fq2Vywb+Bo3/bq9zsDFrF773QNNz0U/g3Grj86IRdlQhvNLVzT

loV5Q0i09A3waDyEbiFTdpgndq+e8P1SmXdlXUX36a
djpCscL9IuaJ3jf4p+Yubx5KXhcj60WRr8MWAycJy3OjC6DlMTNdEC+TNBddxBcvJe/gFzZSGS1oX0vw

3pq0f+IcFdq4RkFtJfvqXq9qjNVHTIO/fwogbY/olzH194jzEG0AGZExJs/CRkjs4wZjO9VrCcXU2nMV

37YfL4aH3Jf4qp4vaJi4ftgovkr2r56hxHrQWUOLWg
vro7ZZpCW8NmK9JmK4L45tSSwpNeQ0E44lvdwo7itNgcRNz8GJFCsfgIrOo3eyuVHdxzgSUFjTy8Pwzm

B91q5s7UwsciClW/pZlUFhNqDop6DSKzNjRckBYa1/NlubBPuqryp4StGG9tQi28+AjOuRb08iXknYLr

4b9o7m5YDpzSaoD18G4I8eUYArSnuiofgslE5/qycS
U+0RaVLGYSz9J7pThr7XpjnSTXVZ1P6ZbPvUuFyxjNHYuPuJkXOQKQoQLKDKvjYtlacGys6i2fzV9WT1

ERJjt0Zw47s8fs8JQ1JnKpeVW+ZsHuPVPRUuML4ydn4gOW3IH4p2JoXc8QnEC/2GUR8pMV0AwRPzdxKw

4o4uCO/CT5dq0CiTBIzqt4M2uu8jpdBixjHtuljtul
m6LGrURc2lxKUWcHCbqD172pNuGilFMUgYOsumI6MnlR+BOPQbzVOMWBg0FxvNtuyIi3Ik13QURRuRWn

xgHod4Yawodjb468zexx8zt0ggFZ51r728ev7P761/cb30UvdgMw7wNhm/La6BpyB0i3Qu2acnSetmcP

zP5D5Kryrh8fKlOLcGre6A7YX6/Yok2BuTCPN/s0lv
7KUx9t1lCH1U0Ei4XYGme+KjsEwxjREK/7ACxH/mVsxYY7f9XiB+HrjFDoKWAzm7xCf0Rpmf28v4kzEE

1xIg6prb/u71vroSngADM3iwuD7BIpLpZW2Y16KTaXXjgUKV9TmI/uCVCicu4RSl8IwbkMBZepEOuedr

IjgWFHUaiYNVCFcSjothX7oD+uXl8fALKRIBiFcY3D
eJg40C2bUvidbLdTUpy3Pa/lnyIsHxrvUG0pQgarhipjEQXSaR1mj/lexp/e2+T3b+OqPzMr8uBeZxlX

S/3C9uKhtz05RC+1sTch8RxOaA7pQg0TGT3/q/iZ3hPbtwSrmiC1PoxnM5YtTlwRnw9PMfo4tJRlSOEL

yY99oe83pNr6y5ZsJDSPZWPBD2Z/IAr1sHwgCmFLUB
KgR1N7OP/MNNLpOikfKHwXkd8Kit0BWeRwq32MpGdoxMupaWsIbTbaOwNl7nx9ArklMa1LaIbk66BXLq

w7zq9PhtUVmfEWXIKH1KSyEqmt8MuR9Fbg0qY902iReGb7QXdTX7mAsWp1FzRLYRV7rL2/TWvS17rBKp

+ZBMiRHT/A18Yf6xRwywycN7fTuU/jn97eVALkvQda
nhwa77nt8NZCItUJx8So5NGmAftHyTTz52K4PFmx/y9fSlTQKcYyjXp6Zfd4zhIPdKhxZgwSBd3dU97w

QNK7kMxjM8sQTd4chbVArMkUCYzvK0sf5BTsJGT1zGctHt/+JeEENzN8/xYQOa+M/pmgvzJFor9iHuDM

HTqW1mU2s7/vWSprEbMy69zJCpWjtwt6CWBSerGVT+
0Dnraylrq24TbsmHbaaVxz0UNfjTSGtu4/KmV+SkNbweqn5KCNzGQWycDzi2aHn4Hw/N04mL/iSWvtjE

TDDthcKOAwIgOeWG7+wTdNKzw+VRg2/TPRb93RMg2kmpwMAQcJPJKKUIyQJ2l6wo2hwEi8hFw7ymq77T

DguplEpjMXvxP3yXw+ergpo7Q3m5BMYNYu5HOtyyBs
dIXueiiz76P/TmL0EnIvRLNZbBgijk2fSdUWFYVzeom1Gc9cUgvCvhmhNZ/dP5OGot8nyirTqRDkOhUp

lx7nBlOOIH2Zo2KakaF5cbSA9lX1e244+6w3W+QjfqsZSu2IBO9u2qPTGoJSUyd3JvkwmPV87kjE/Aiz

VgmsyvpqmkOcrisSfaJfGkaUz8ROi10j2g1UX4CSiW
anSV+rsZJPgKS9LWykijjCCZpLZ82CxAwb/yBdTZDmBQ3SUXY7SOA875Z0QJlrmEfRLZiHK+t78UCJC5

nlVaeJ2CorwMygTveoZH+nSGZGIPe43pi3z+ZqlhPpWV9MhVqvhkeyfJYhJ8p9x85FeSr4jqdAueLJH+

dYejB4Dfo9njM1sEIcQfzK3FmhOLCDc+Cuq4huGZz6
73W+L24CgkPnQdGvGsygmEUc1xVTRlltGyxhbOeALKAzIbwN297nsHj/CHuX9yg5CBUEG1FijmakbpE5

QtD7GdpyHf2AYGp7aOdAVwZKG5vj7JM0OobtHJrFTVCkaEedy/SXS1UIzwT8FVinkA0Ai691FeFaD2ZQ

dxuOpXgg1Q3EhgPRqwk8jhC6cmjmGTpafwIa/76UtX
RdlPzoGKfpWwyBM0s1PmGUDwnzU6guo6+930A76jC3M53+jZLGHlhzKCcT3uQjuABxT7H9YWEZ86wM9m

sJx2SDClr98bsqargoyWp4xMJBdE0Bjrp90GBxbVieg/lhx8BkVr0eGSu/Hjps9FC5mnclJnEO4bolmr

HhIHX9UmK9bFQSfd+t2eUQx/pPtIZsBzfZ6gQH5xzo
W+W6V4nvw5lyj6Nfe99dyMl8lDtuwyvNN//ncT8F9FuI+GJegAiO+L943AxSzcAnIhSpZidYHdoDVY

IafbjES1BG678k2mBGbhCEgadcwU0zaGO9S7i4xoAAFGYXtIYJeKHXesj/RQyI6RIQ3VWOVCTDtMlvmG

NwmBuVje9BT29wH59LhAOoPgDSnwZSPP4+xk6MyYa6
cVYh6WNpoqrYUzoR5ulbIwKpd813O3nOWEmQmsRl/WLWu0Sqo1qW8kbzevtbbn5oLZmi5FHAXwBJ58qM

q9lNcgQGYRX/4akGMqKFAnf/hcG3Ugpd9dKv3IfJS6NFsYigpfFCWFak1DZyZylzQPW8jhy1WCs1lBfk

38KfyjoA/mX8OR5QPP4e/DKlPJp3T0Uz0cM7Pi2TV8
rwH/rfq6O1y6jyE3wRgSVdzbOV8xat2NV85Y7QeyWzPykk22RdgrlKOWygFkRcn9MwVc1tCL3BWZ4Coq

5vgIig5psH2XOOU2igbxGow/fKfbYgz/8xmzHlkMDESq77fOjVd8Bm5AEFF4ISwbBI24oVqG0Ltby7Xq

/zQ/hh/w/2u0lw/t8MVL4G7MUv54+LRkP3UUjmKsyD
98cw8U8R/AbbII0mHIQHf5Z3VprQQ0MHfqQdrsRh/O30Bi33bdTppMnsXMa17sodENJXpFtK8uR+xacK

quZ9q1bskrSm/mgDqhfmsr15fzCcOt0ggt47TKj5VGrYG6tp7vEoDmPFxRUCij/QFMuBlXvNz3eI4lF/

aV3hcPnjo7uPbzQ000XMnxpYsTQDnXes2vrzfTKpPJ
JGpYGZtCKGKbVF6fRIuajHVMGLm0zpT65tcBZ5f3JbpnJnjZbALabt3CffWnDZGbOuW45//lLAJswWiS

+gPfpupZFLFJpk+Q6kKDDkqF+cKXuUBL5SCWNj32JHz3V7d+nVfIGOT0mybMpN2d7vPfqjun5S7gdfb8

lmq9lcypISGN+Wp9WutnO+4YC2DqYKmCxqABT+dqBh
bqwJVK7VLbVQCx0lln47pfyKYVoNzVx6q+7Ye7psqbJDl+ptlRpSWhTEgHYl7Ea6MIDwUEpXxebIMxo3

3TQEBkpwJZX4A+ooIAQ85SKQwzxc1tuid7zkNMC9UjaW0trMECeJoPqX+oQhBVAt51c/D8Uwi3d4iAT2

FqcJudrK2WnVRr9ELA5nW7zXRWWclJhUEJeTH7nv95
lJI7trCJpvTU7rXt9r2RrsEFriCmkaeOk7b5fYv+HJ1Y+pvVlUckK3cBUMQ+KxyeTOM7IQ1F7S3CrAbh

ppta9dVvl5OmPIah2nJCUEfP75q8kGQ3Jwp2mXuJTHc6WjMT4cpnkkbz6frtn6COfF7dglwwaK5+L3e+

k9gDAulZPhnjkDMYFhpqtBo+Bwy3AsrRRhsAmchAWv
pbY4lofKpAReZyWozrvcxtzu2mzP4TbT/Bf47+1NM5Ys6V6VpfZnDy6j1wlTVyvTS8zq9drenGrFT0cq

k/kve5XYEGqB//9BkhArNxwMiCVzcI6DNednPT4fcbzm01VkqjkrZbLDhNWfQl1cQtPcTaSQzi2r/DsV

///HMKFoztn2ogYi8e2mlI6xoChOweboHxN9UyuB0X
/99Xg4z6AKTjiRG2ArFMlbQOXiF1ICiC/xVKy2EmUNEMyoFTXxKsyCvAvakL35oePvcv3sb/KejR6MDF

vdVbiRK91YJ29wgUeF4njEV9++BhBSmdYyh7wlEjDh0xiV1KZnRHKuEuGwvk6u7TkC4denFs0ohzoU7K

uMNc9dQaI/PoxLCTHZHChPFD+Ie8tovBjpyIvugTTh
ZucLealdczlYZaxjgVVxtof8O3hDLSecRZ3ANnzGlYHd49Q/xUlXwWREwJI7LRGV3kfkF9qX2kkovEAE

O/mVhx+YC41tSsRsHVvA+ZGVWImWeIXl+1cxa7CZax4oDVt/MnklD0DLY9IW4+EDPKWU3xNj2YqMnrHT

p4nUj6YVjayWUslOkxAJIqmKLeeZerT6FPrYuqFx6w
Iwbqd2CyuCB46ENuUAQmIXwUDQcoE2bEfDDqNbDkyPrxKiG9gQx/28es59eqhPZm5bKmYmXediTT33E3

BJ/1ZPRj8SejU7G8KWuNZO40L17nmxWu7xp4vGdFkkvoxWPpQkyZWbCJxJK1d0wMJzKry+JdGGR0e+Chi

Ak5P2NIOCkVowbSf5fQFyXg73GP9QESN+46qy1KE0h
p491MO3Yd5MwRL2+x2/iSkW5tHPcNiHbaH8biXaBcgQb/ClZHRnH/kz2wZzetTlH5hUH0SeBNJ6kfb8u

NNsHzT6Tb5HXZi1rrcSnGhwJseW68/4MHd3j4CJAUZj+F+kSfBmrb71TG2GUVSRSfQHtAkcHHorM7peg

1HGPRPWe5jnZC/pbDhnVP90pi62NdPaTLEDsJxHgGs
/6PAmEEx0q1EpwjhZZn0L4Lop/r7og/FwtezSVn72gAYyeeLsRE3/356CXP3Vm5jW1/8yef1Gi2+/v0t

tIN1ziOgB/Z5nJxla2GPW7PrXr8a9+GqD4sKGBVZTDNv8e8WZRapDag5a7YLLS9F+jjvllBU07f+uGg5

QB3wli6LI/e9eVLXdIkYM5iE7N7CWhO6Hm+LZqeTIL
DpHgnhJlmAOZenduLiZ6c1SpKr0I1h1wVgEuaAoRnNcNGdOhSNLHwyNkml2kGiBd6qbHiIYKK57dNTex

6chTCUJjcJ8ZjZzgsf1egg82CMuP9hoChsRwz1HT9RsyLjJhg9Q3ZfOKo1cgSer79B1zNbhRfYV3CiYn

ctENmDPF0l/6sYQlC8ibJgoF3eLExioiQXXEGPOfeH
0dT4SYJv+1sAxYXwo7wBZTgDNE5mpR8JyL8enEb9XoZG81AWA/z1yfqyWKfOy1xC6/CYOY2o4fpiXa8B

Sa6qdccH6JmwsvLx98jhdV5EwDYiI6bXD0Nmu2Detop4z/hMWwyRQRSI7NEJEzehf8QcFggMk/YfXTwO

nSaTuNyY9mL6oj3+iigpNLeN+vl+j+vwl6zAPdvIGA
xNNhqdNPMkwXDEUhDz/dYeu28sexrA12eCvC3T3pjrHjoZ9g9mzVmRIZIbmWV7v9grThe6B8KPypudvo

RxijgKksuo0h6ovuepw/cZ7GFslke3nREgiB5bcnBPxvrajW/z7Hwi5tEAR4b6yR7EpENPGmTLtJd670

IInsX6uMqDyNgdx4PB3a56GTwbXE815I8BhE9OMrFj
nBy0/hB4musDZ3u5EVzfitPqNOz531Nu1pvFofMFTnczr30mHAG9+5sG3PH4H4e9T+qJwoaKBSeumNGj

ssYjIqms0RJNCC0u33Xg53tGb6x+7/ZnhJ/y0C63Pn541rmKP52+Qlk0V+RRusz/q9hLYJsc5BCIuZ9k

9bPw0N5MOWPG76nV+RAhzvkk18ZdsR5bKWdhb+eC/s
9HP+k1yKaz5+DDX6N9RS8UTgbN71xj3hMMgqtkTrJDXZ6DQfyyx58wpefe+CPmuJptBZSEP+8qPXlR2l

ZrBrHPZ+MGqz+QgsWvGPrq81hCqjPOFGUhDQCJMb1qDLMdOCGniInJtby169ke0C4dzXC/9EX9BOV5VW

ZkYgz8/ToZ/zJBsCcLn47wWqZ/pQ+GMJnzYvCJcnwX
ygHwQXvU7lSnodGzncLdB+yP/1W9F+AviGFWGPrV5AvP/vjP18NlOaRQuWrEt3aXafYYR1C43uWYOQ2+

o9riiBIHJG4dp7Cem60oo1uuByhJtbu4vPbWK8XXL0WeABbgozXMaaG/DmJ0hEwLtppNaBosnDWIzj/U

e/9c0MPkMnkhdVyNEusjxOrEBj3xsR0A42wZaBLb4m
lHkKw92wsS/g9/gxJ1Z6YoofmGIVarf/EWsXU9DbpUxh/Cck0ERt3hy1Oy4kvS9IvF7hjtxm4KN2QGV7

hchlFkX2QSNQzo/e3oPU1d1E2eBgehnQhJC6necuNz4R2N1d6oi2ubtI/PueGXlm/K6l0kDPAUpk1IkR

198ET1D/S5UK6O1/C1kCcY16XSwc60D3It/25JTAS8
uQI/jooXviZZTvB/hzQgKDCOItkuNCWghdL7Y1ix47ft5Uq7Bf/y7lx0Mwdqfq7R7TA+WiJ4CceO2m+W

PhvOwogm0sNbueVq/ZxSXuktfdcsV1aqgapotQbf9Na8q/+Gt30+d+XoZQXV5s2LpItNNzDLF1Zy1aQz

SYZizWJ6aiotAJUwa9ZUCbDEPYdj4BEruaGqanKiVl
8N++GuBEbMziZTUP2NtB0LPmyvs4voq8jWdo6wyX2hkP6zgYmjRYMGzpxJ9frbqephaqnT5lW1LC+J+y

F9ZHsPXyQ3FJBp2D5Djy4JVXrwymddDysCvvgxUNgk/FACQp0iz05C/wixo7QgovryYkhDXpVoADWpFc

DWNTvY64a3sDJhfS6gXOeZKwPjIsUztlcibLwIhCIX
j5lSOrprPBtjdhcPRWGN/MFCzphhoROQIJkoDNnIv/921wNZmt+ZGJJz7VtuDRW7sie1yS+Gqxc9U2oC

IxdR+d40Gl2ULfO+2kctWXQhQwi3SBHZzZ8ZwpyE6FVl+LZ+SRhshFIswsqv9Oy651HEryjpkB1dpSbr

MD69EXGhMB+nRaVDYGItrTqmytyTClq4IK12l6ugg8
y/BEpzu+XzUdUCooTVW77vlmpY2mcn2aQcFG4AL9LNNaCKlWd44YnldI2Twj2tUePjVR0i5igR97CngK

Qn51qzVgHJQE2yfUk6kDGsJCwP9o4m/YUbLjDzmllwdlOXwXqdq0C6ukif4l366cbxcM7cGAYXugHxj9

XUgsP96P5XweOIz3cjZ4rI8p5ujZJKd1s/WRG2rupr
+DD8hk8TzKl1yqqNFcbcJu4DhNAuv8GxYlazZjSl7Vb0wFowKtCr1Yo1zS8QmFw05YWeSdBg1pe2gFWF

+llMK/xesvU0kaK+pev/QCrfE84aiPQV5zzIN8A3RWG9YmUqoCPBBXqb/UKtmZx5i6o7o/kKGeaaH3kw

31lINMLtkqA2GQ7Kh72CQChIzTgBWrb10A3BnssMA5
XkURA8anfwhGAPSTpAQJarTUNCPYHge+A++DZlNdoqJatV19Uf+0CmqThrEv394B+ohmbc6e9+9X17da

+1/vZAOesZLXc++wbwa8zH2TPLaMYpAnYKuFwu8TpOZSQ3i75t81YJNJdOONpwb4j4BACBY9BCBcxJNE

TkNUybvR51ydGp/uOy9yibfJBgwzhCuwSFaB8oi4s1
xCvR3/j8LnonQwPMPjDo+nInLhNTnOrryJQ+0IvEgVbsyFpbtY97R5Xh3lRzq32jFVYNIlsXSVt8Vxib

6sjb8pwpo8HbXFNCLKQUGKjiYJT8rwiofxYAhrM3NjCERmPaGIjrGIr53ztX8NOH3gUIaQv3QP4pWwAk

CueLF1TVDtqLDc/2MXSejMVos5hfHru7r8EQb9YtrP
1SyAlC73LSIu2yb2+IJptEZllF261+f0c1SAysmcnd5LFaoeqzaqHoHF+aCH5wv96mxqvHzWhoxAhKLB

W8YkE5Kh94vKBzZ+ZcvzvUrOwFZ36nn/LzP3MG/xG3LlPPYX/QAy5wPvn/7AXT088nKwrGsxuW1j1+a3

VZfk+ZkizwU1wymr1eoxGe3d2IS6UXBpPJT6Qv84+f
OeoOm1msMYtsODUa8RewufeBA1YCZwpQQ/pFGi5fZ+2bm85RBfdDlJLZed6+ydj8YtRAkyEZeTeTf5L9

JmOH2UNDxKCm4ZKeclAVQcPuVoJMwsnjdRVD2g7AcCPACq6lYSobINSuhQpeJdr6naZSXKrIRR4AZB5i

m6FXW/Roh6211S2O9Dln8uF5kff5oq6oDsXIuopLg2
3FIqnBGmjWusG8Rr7zWtRVCRp2xBY6eH//EnUJXCIgEeRcQ7uac0hTDSI/1LjB1bf4Yk7K20OHDmQy1b

dv1i5/bflQacM++ywuzASZ6o0PWULV1LBnQVJUO+p6BvfVUAu4GC3J2j1kdfwZRZBu2jY7ODbGyb+hw7

wSXAN3vD/o/Jz3jzDxIgT8ba4HSGnp6KXf0EYdTvkK
5BluEg4jafXZQHh9e0DeXoY9aslFpxvO8GwO0Ozl1oKMsGp+kj6RMhK2RoKMjdSp1Qa8cLPF/vfxp9jg

E29VklMptb+6yZQwN0CzSCx6tyjij+0G3C7PhvpCPPffS4DYh2cLBtfuNK0OwtnO/vZmTuFjyxz2oou1

iXhbIeJxEEqJ2xrtUQtHYk3b8y4E/cCKwLvG4oSFzr
gMpssmLQEGQuLP/4Pag//L2UW4Lpszr1058Z/cKnUT/2UmGLiAhztF8JH3tQEzfEPiKFqEg+UXsbIMVH

cRLwDVhi8UEn3+mxJQA22Soy+KfmuBxj6p/ZhEib/ILiXL/wtyMKrQpvwqClN7n4hU+H33h4uqZ20KUr

kv87mFwsCVoPM9wNdg2oD7Bn7+L7lQHyKY1zlpmiIe
+uH7bTMSJncl82smT1mDWDPamE/ozgPbwmU68tXKP0KvRfDNY+lzA520pipKKoNR85Ijw89Xf4+wzuGf

vz6hpf6qEH9aOnYzP7pXSZ/Mk/RCs3tBQ0FY5koMiXyoi3jHp+6uJx51VVBWyOISxleEnNtAhTSfcwjM

ay2iIosACZHzkFPQhzAq+tuK0bNH6l0QFjjhuX08oC
UFaHjKWu3/4xwMrehWRhUJ7+hhPxzZnUZI91FvZhBz0voZr+f8ABxjVzCgiMXlm618RJo/5MBy9dQcwx

ZK4EsW/tamy00x3NLrfE7pwLAG4m/bA/gQtfh2wDoWn1JEQR/hoIflleLfa8RDoWz4itHI0CEzEJsMHq

3FtWbW/7jgM1EpFbS/zmwBh14uMqA6UYOgrjptyzFq
3RXpXKDW7QQv4IgfWXmSHd6/f9kwCh3P4XVXK2ui7Od8ZtN4l03e6LIoyCQ+9MtrzUAkcUuyqiCyHixr

CGXEzuN8CZFNImHLN2fJxFtpCMQQszCcFSozauAlOqloG9ubTE6yFzfkz7UYJQVqwXiVUyikCmd6CbM8

K+dtYujnyL/cnI4XqeR8R4Wf/f5p4XPjHn1jkZcLhb
VHtfSkT4GCI/oNxFpM4by+ym6wD7mwJCZ1F9f3TdXbMDLgRKM77EP0BBwZshrlOZ+sCC1K1OelQyWfvU

k2du4palHSPupYhwBP9T1UfYXwZBQN+tqOEjIzgOn6NSTFNktoSkXhz705aJ6Yp5V4ZV7ZF0d4iE6okx

n24+1KN/GeWRSBOORTP1N1hm9G2G4PZPpwwHbsJWH/
HTLpGev96QzfEnCet89SthOoQyRtEcjTPb6EbOhU+R3sumIN/LZU1hsrrfiF86U6FBATKSjGcg1scjus

dfKzwgYt4Xfl1cDJL688oCdiG2AZ9hd7BVgvugGjTLjSKLrqnIFRHKP/5jdW3Fn5Xt0Gq73tMqbTMiPO

ph1K9yHqxTznkoQmK6P9SZetftC1HtWp1AnYc7D8EN
xNw8GvFqH9TgGhCXHf9yOMQcNFZZg+WDVm/tdFOkrSRp8hJDpd8f0D2oT8eK87iD5j2Lxao9rc/PFN8K

H8KFM/SuRlsbCi837U2eqXEI4pBptCPJ5pIrhWqmFJ3qttD/R2E99DhbWdsz5ih+jDk7C0ZG0Y4ajg00

3rXK/B5DbUYmhSp3NLepLFNVNNK2WZhcxijzCgxBoA
KvPOdBQzhnAo+WYFGoIITCxpNIGpuu3tk4bzRcaRZt7dHzZPXZ/g+GTMrKh42yQyN5h0L6R/mGOOM3e4

uSEMF2yqash9nFdAZAF1RqitvoaG3rjXAHUBb9Xd/bKkwuaBm/6jwbGNkeTnl4qXyqF3bFdGNAQnf8wD

KqtCi55IdfKkHAjgbbdMZ+hw8NN04YcGfR+mSHrvCM
8peMGxUb9OeGLJZ7xs6RijEQ8n/fHCUCuezx4CbBrkgBpWK8Kgah3insf6GbaaUdm3RU/nigoqMslA/v

f/MxmUjCRPZx9PCv6h1f9wKsKCmU++ABK6n3fdAWiuk7a5E8H2+q1Hwl9zVIPHyr6bfll8OYg39QKmzE

tTESNXbk+QegEi3jHhRETjpqEtPYjegTIZWnowbFhQ
7kyhEf9gD4FK+D7v8MVz6rDxWR2WRjKaeExv1QPyI/iHAMdLxTPqxMKXRG/jMTBRBFocBsgO9ZHnEE/y

3U/qfjd2mxiDrc+Ztywt/zHzMNbm0jkLoB29A2j27Glz3zAXlawA0hLbLgVt/WMNVMlSyNHMltsScAwN

3q0DPDWK21+7lp7EUOqVsiovwqWnD9riGHVpAC1KGZ
vimYvR4MyyHnaB5vtxGDqeCDrWerrtRN71lQywFv3U/nSFxY73efGxiewD34hEfFV/aEiMXeuZ1TW31e

tvewamnNb+jmt0rtuqknXQM9Q4NmGUZuMkOaQwzXqXFnLSNSGEX8Bd3gpE9lsBuWzhzPQpLZ/XRle8F/

KkwD9caaIjjOPWd4z6VYYQ+BlqTrbya19LCYozMU6r
PqbRFz6PHYJ4cgrrct9cHl9gEEvZPlhIUC80bnQG9G6GjBA/EVHfiODE4ry3oabAQOVmtFcZr2jdRJhl

vSydnfYYkNy5XJ7DBzl1JN3guzg2UMw1n85F/AZfn9bYpg5HzMStfHshgF9tLAeVtNREIqRXxVOPE0uX

/mLtkiVwvSb25kFE8IEt+37rL7o+lv5XdvB/lxhnmK
3HTypk+pjilx+3YYxKwXRwZOePstStZ57Yx6iuYwQVeWLDrsJbApO1cqI0JAZ8hX1PhfhzVns1NIS8tq

388leFKYaN4+bbWcUmB8YhPho4BAjOPgKECLteob0xoRYyvt9VmsP+T+aK6MbSi4Z3j9LTpTWwytr/C1

Dpg+ZapIwvOyieifKGXpQmdYfDTyhkEj7opCcdbhBn
cgiE6TyaQoQGUqsJLBijFDeE21GqB6kaYa4nZ9WFUxOR4Ce3lOVqjbo0UCyBIr1OPiRubdVLKXbJKgd9

qEa4YrS+kHEkY/kPYEeyDRXzCPxLb8DbWkJpzvnemGRiSoaZ7tA4Ica35F3cpBQ3o4xTezpLGUue1fxY

t6cpQROEg9XM6uzr3R1g4xwkmONcknG1alxuU1hw7a
VF30LcGq3ZneyFxiL5Go0m6l2iAVRJwJBYpNv+VUyRm5LNkLQSWSpUkmjieYsuC5kQsIRa3Nix5Ht0sg

Uym8kSkleYsiBA6ngf1oM9m1tF1sWLJvnscwOHfxyn/EtyAeXy15qRv6y4+XmfuqfohusLShVh+pHdZq

uuo93mvX1gmoU03FFiAyP/uJXmx7OdYLh/8Y/dwKrB
7powEqTvzIyzb6YlZtctxaMNM1Q9oVDNzX1BQYgGQWXUN+WdN6qVjWOrhxiZ7CWFuVVultNGd8xP1KYc

dktiKhd2yt3Brofym5ZGGsQ9Pryjec4M10Aozh4NX4LrP0cZ0dR/vVnsJM25kiSR065uDuYGcp6EZDpM

8+T7gq6oepm2YKBOx8dKySGfWvTWPHzRTwV3fKg77M
2V7WyY8LvkH8VBv/agAMwyMCBtJ5lXk5BGjKfVBEX5dgqtosG+6F7k/pp3vu59IQTzlyy8d9gv9ZBbcY

b0oLWrBHH2OjoQllvF8c1S7h7XdCFUBkKz59CYolhFZLdaEtiaMyz7Zo2JMG7cLbmHTmrsj4VMFLCUq+

zdiEDThJ7mOEUDo2zrQMZdxmQUtGsNCyLEN/NVb+5C
LCBSPdw/+Yn2Sv8QRtORTvKtErwgA3+mm5R4ifVpuxmE1/tlPU28jUCDdywv1aCRBbuTp1AkoaIYd5b7

flEMLCFZQX0eDTsEC3NeL7u0d+pZ7NGonC8q7zp6SP2zTfXCFX9KY5aGgSEDYTDOWVbd0TY9Yk3MzOID

eRQ4nHyZpzTsca2396yqyEoUl/wmkelBa84BfJwovX
G0A9grN17OEc+j1XCZedbR4uZAbP1JYW1qmhE1S/lO4UQn4QX9tfGIdcejEudFAWkyQAo2Lp+63PVd9/

tqucHABBGXWyk/Q+5iShWFBZjrxN78VFLquBANW/Z6/cbrybxx2xGt/v8RtgWNkayNb3Y3S10RaZP3+d

qXakAiAO1FTPV8I4TcQhJWPDDDDP2qZXJcAo7t8JJM
ha4uy4Jkns+ypQWlqQqXqSt/NYlrlqFoPiMpnHrHRjMnwY0ABtZQgpKyvOVO4+4/WX6YmyllDOgAN4sk

wxBox3629if3Vyvg2BCWijm9aEsD5qj4C9LQbkZVAcn+sUL/Uv89ZkEjpDsuYa2bk5WPj107SjEn4+tD

1JFoFsDs3p1u+G18QF4xFr4WmRLBQVVKmdWoBuMxoc
Kg2rQdxFFq8vcR6nFusiAhkVnHmpFwzynY2c1Qv4Sh0dauxQLSxSXdUmn3PxmW/Ci7/R+ti34+47OQcH

exVpqu+aIQfFzt1JlWXoQelzERa5nXmrVp2b6pax5zjOV7syf0/D1gr6AUDK6O6C0sbJRqEGB8wLc2og

uEBOgvdJiQMmxon8bn05qZbZJEkcDFlP4yMSE/bXmC
zKqzdcBWjga/oiwXncUgxmbUqP0EbkGXPSZ6FLqLuTYNNL27eBw/Kf80yQyo0UYg+DTxV7YuqpZ3db69

OawoJBFumZNS2+Gcf7ptrAdwiGj3XWaCnb9j6YZsGDH45rmskmkMYf0mmlQiIVK7YJeBRgeDDSHacG56

0zwZSTPpC/mmmFGpXw+fGw5C+JJ7uBKr3MR4RMOICF
qCEdZy5pWyExjgdh46VKvTlAsLwxLaOKU2S2fTDJLa4f8NRsIk+TsE8D/g0U+ascIBgF85SRYoMkvISr

4C+h+W5bIPFWCXsMeGRpVcNN8JOmRsjZybO3uuzzuPx6FBNrvrirh6YLOuBe4tRjaWWIU6vDTYyxNNO7

KSUoRLztohbXSu2rgBkm1ggA5QMsi5IsuJ9hxJi2tY
pDUpDz/cCqn17H/0PHA+Nd8o9q9WLlQriMjG8ux3lhjoZbjVtBxvRtJvmmxy+J9XcPj/x//DAbDj2r6H

IrMIxTwIl/26wIPQudeek8wU1aFMgfQ7RKtZUNZXNNv5lB/S1DF87iWBVpYrb5KEbuAJ7EUhkcdFCPk/

wbgF4H77QskoMxkIcYihJDUQ/muCQBwvG7nlFOZgsX
NcXiecpgjQyvNHcIwMK8LVEJs3lfsYV8gsg5c2TUhofZiA67L6KjOGeYD9ZNq49VfNanTLjXvxhYrMRF

amr+7/iRGXIzrpE3+/0vur9goma7VsNsSfce6YfZ47OF5Gx6//qG0v8H7GCT7a7Wiz6usioLpOKz46+k

GSq+ZvxrhXJT/gM9cxRBH77xsdTrhOhvTqMQVBB/Ij
uK1fN/l8eq1A3GfuaJqZAwHPGMsKzBN5nACPnuPNpw4vvS60/ip9QZdDYVu+oZTsUnLle1n6AMHUFS/h

ODvH1COfUlcmMKOvTJ57SViYPy5cN0m8MHM8P/cMM3SRoG/poxi2lnak6aWvpv+Jyzeo+ITkKAbgCL6t

A1Ecih5QYYts1Gt1PNS5PXb07/3o+Jq/Ah9v+Pk35E
10JsGsMpttXBknxex6SLIWMu9YD0FOIIMjIxTdfbJokdRG8qqFmbOJYabwFx/Tot6XWL04FqyYKPO/jG

5POMLDzavp/DCzaLv63IORa43JQP6Z8QBKVYMdhuf0EQmZvlMCNJQytigyo4CFqiwpZ/Y2JD+9ULVOVS

j91FPF+Zf8UKESr/dhsWzNCKRDvvCuaABofkamdc34
WP8l7cUV4P4v9tLxiwHayj1ygiUqQL8g38pay1oOiIUesMJWQQlVIlNv1+hiGdbsuj7RylIXLy/SlPC/

W5RFB798wdJlrCKV66WUHV1nYz7eoV9XLPN/D1QQoVK5RD7uktBYEiyxqGkjrVpWDOrHYRI8cQFTtiip

1ZWhd39t8XcPYSg9Vw52cysx7lQ1WPADzCB6V0Nd1j
VVLZxR/ANdnLO+93T9/x4Z6Bkd7csGiJ+P6Eib8SU0CSFeS/qokJrt8jydqPB6CjhbkNMnkSuwVwaTNS

Kabf1PqgLv0aUHWmHCltBO5QSojrihBwYfb1ghAz53sxAQpYiN6WYODcPyn/EcX2ulBcVYTFforoLeRd

MUhe47mhcjny2JwVilZZ4gmuPMjj8yO6Bkm7IEfMAL
2nH7PhcGxMfxqonK7LDVw+6LyxB8lz7019qbHd3pAydgww+IBnZ12/d91ya6J8dRtT4F2g4HDrEmQc8U

HB18/+CS+wdGsZ9ZoascjH346P9ym6N/XPC2iHkQtw2yT2E3yWNE8LcCp+gu2pT+njjRmPErxztKA59+

yy5RzMuxebZsuCxrPw03e54w2zp7MzMY66tdpa60L2
R+o/WVfXUt88IsV0EuezYjyVsSZE0PO3fAzj+J+uP/9mF9URFrj9qDo9ynoZB+xMBLv1WGCmfJQljpoZ

jCtnguQFBF/mrCcUVaFi2Gh/85YFhfQLstGcfrkOZmsy4o6L2ifh2dI9h278Ew6/qjAkB2G3KzDT2K+i

UEIUVSdzTnAl+UuXVJ7cfCZ2cLzC+SjtyjpjXgEiM6
FkP9YHDUe2Llu50IfEARVNJLNE0Wg+kni5X5vrt2jNMG9sy3ePSSYIod0v4jWeWYGaJi/Nw+ZSmXrixs

VC0axVnY9zG3GPe4la1ctXEBqO//jnepp36lrxJivEEy9JQvkQfaRwGPb1gSTgNPQlWI8ozABZ93+7Ph

ocsteewPM9fuAdme/B6jANCb1WMkT82/M1w0DFeCBJ
EOXwSSCfTWo0K+B90/SyhIjX0XKR/8lrbtlkxLZSWMvanD41Lf/0UlIECW5SGFFpa9A+NqOR5jiu3oL/

8WiDnQrzRlPlOQrP7khQ1EjBlWOO3lEU84sYxdDSRUrEwz479DufL2t7FIsaOw0bee8rt3g+02Nxj1cY

TjUGrpLnPWYrSB3qlBQbUIjJ6OqwCwKaiNkMYjZJsx
GbkU6+ELqmR92/CgaRLDMY9MUXAUmSmaFWMVpaFtFsrAzYwmwa0gR723sdWoEqLHJTECbu63BVSWabL/

j5w5BE43hgjYaksvarHHUOinC9VwXS+Qyf1nA2S1fqaTjZiYkbLu4w0iGDO5WQdz6uN9P3JmaFseMDmk

YCanHFP52TDaI/t0klOtnc1xtM4JgXBIKHdFIHtr6l
r1CWpxXhj946t+xQl2PXLVF2aElxfVdQ4PgxCnpUOG/xGA+9BiXwZOerFgC/0sNwPE+bcDThi64j5Pj/

8u0nDZPWPn6nd5kZ+3sig4keQfrMFp+SrSEr33Mlc+P78SLpt3kqtr22GFmDsXYeKl5Agvtwe7LKIVnq

e69V8skbH5wTLexi7mYb1VkgRugHxJuJx4Sn55aojK
C/uh4JHcuPHk/viPfU7LBm2Qqtj+6+BbbpQ2B33astonY05Bfob8X+Y1mTtZuwuwwHlDRwbDxLZY8xd7

PEKVRB8pMibz5fTW2pjBy4tgdIjH5dQ/NIahimJ2aoLL3bFidYLAfaN7lwCbouK/MlHxXBzhleKXH/st

iWmeH04xbjNoAjP8eu48XV3tjVBPrXQIhektFAsDVX
H3jGftMlxaO26zbVodSGkS3wKi3k5WWkq+rMts5udOFbAFpdabgQAvBZnlESWD28NZJIIl7XS/gCt/Or

0c1+A/5pA8AemRlCuCPiJPFfNIBnbVCrwYCk2YcQljKE6BdBj4Sjb/M9vy0Dt0stJFA7RkTiiX/ss16I

hmxgLApyrAKshTfrct7ghjzU8ktYJwTRCdM6G/5Kxr
zTcdwxMDX0kY9H80kO0AG+bR9ZTASaCicWAsl4QveVeY90VXp2L0tVVNFVjId/7Q//5Je0i8xldaCih7

64IGENRjFIUXNO1+/0JsH7dd+UYAfGJeRIzkjlnMOnk2nyaYCWKT95+lnJL75orjmIxRsXDhlbYq0b8W

uLlbgwqXQXJiB3tagzo7HuzZYepB1PIzQc1KP1RRuR
2U50c2zJ/C4lx1+MXL1z+vJJyNnCY5/2ZPxMbKPXKGLGAfDosvP8zXB3xgquibOBlsiY+XufP2I2dBmR

Bm9ndqP9elkBV/Rc3UVTUO1EYBwyC+HKjrUIgWtqry5mwd0uhTCssmEOpYphzvL1VED1F6nBq92kfDBr

sP8UxTVq64Mo1fee//CEIX5qwDe9bH6vXs1ZiVrApK
zSO2niaYJVO8kn9zb4P1njrpPbf2CiHvJdHgRj9VjAmCigrzzP1rSRnddA0MwS+tWN16Gc0q++0wlA06

K8pvEF2LfwxLi3xFTd+ZgHModgGI9uqmrMZyVT1eN1wCD7NQWXnzbWs6TfiuYHa7VPFDwDvfM8wiYLKb

oX6vTshfcadT6WdY5oA8PsUOkMh2iD+P1zPnEuPyhW
CF2942SN2XX4Fc5kpjxtTO/S9pMIbqZPB0F0DHJ4B257u/NyWy4O64DS4M9Ts4cG+8AHBcUbGTzWllvJ

PBRKV1/XxOd69URAn33tanB2OeQjnFEj/7mRh+QpgbSBq/KSV2mAelbKBnxc8k3Zztkd+laAt+hwB1A5

3jBB83JZ6eHq2n0jv+Ietxb7MiKW6vQrVc3NuONdzW
rAaYtaaXnlQ0d/DGH+qamvUGtuPvwNlyqxkwpm6MNcSgobNulBA4XWK7ei9oA0PNjJ8wtMUOFrm/l/8I

0O+CrC1GjS61DOxRpScn5RcHSkIAgUFAiXZcGG8s+kJwxXCwF8NA0/65YFKjrKUM69nd7S20mm3+71rN

d6bCtCjo2QSn7JXUFuT1eIdoi4DS9YDjqdcQNXu70e
bfcQvbLUYFMe07oaHKL3RBQRPjBfaIfQQdatLklQQ+HENQDxffbXvl0BESpQT3K2DROCpReShRmNP8q0

/5XnAFQlWdMOAymE2EdyMz24cNXe6Swwg5UQyP6zo90f8XJnLtNcFe2xLgrUvuTqp1F1esTFOLEpjUtL

ZLpMR/9Xub3BnXsrWxybH2JZgAyHz8ck2BvWnqaSge
CY+Fk6nLrGHuhGLKS+cKqoyXJA53/NWR8MyG8X3aYhUrYJLD8Lvt7ljbpq8Je4268BMrw5Oo+rQh/7dg

DaR7/pfgb6q0t6V+c0SMyhSfh+mfLVuLKSBmsp8dvnGrByuyPGKlNuvKEEPvyR6keCYyFCnmOvWAmJdS

t3jRSnkEmtVvItdbuZKtOXm97++9psMLSFKgQJ58b8
VJKxSYA91ZrdUHLSv8jBAyq7Fo5HsLtnoigRxM9LWsX2/6bjzriRoSYFPyHKwXvUcfdC1Y2urJgA+ncl

vnn7krvE/OvmO7XkjFi3SstE5HBM/pKcWj3LAG8Ranhajf9U5Gf0I4ycrIit+dILntGfuFZjGf5nl6o9

u6UPVq8uN7bl+EuHzHrvOIhSV2zPGGuOY72/pmjfKF
UCqpysqbvchKqglWRZkqRskvmuyrBeHBwU8/LNoxWDRM9J7gFFTTCVqNleeHdsnjxyceI6IfMasol4yi

po+bBf5StzDt8FPndaHmSVzyD2vwqKpu9VQejFv3VFlYR+TExb+x4yBbjPpGWsNf/CTg/H7M4xihMyNF

G1g+IdmLdGB8YkLnyezR1BbDvZfFWdmNSLewII03YY
yoNm8wxXaMOqjlVjbaK0m9yTWzvRppbVbBQ8HzXixHkJcX2kqjUMxXeiK8jU5tqno1UJbr3Jb5A1Y/nq

WCfj5V3P2Nm8g+xDiKA3y+n02zNasW+2XGzKa8wBrH7h+giamLyQ0lqRUJ6M6BNlWEXJvhaQHNoAe/VW

r6hRF0uj0TSvBRVFYVODHPAt3Mf8lMI/0hEPSx5BNf
9Lc+ggwJ8ObaTIIJTwl/p5s8KCg2cpujVOAo9j8eah6l862AhQKCHcsBJQ8lido98LIwqV2zphEJE3Wz

uHipl92eE7bDspm8oltfH0gqmtI/0x/Mtyf/OJ1xwHnoqe0ox5mV7Q/98xf9bDSnojKgnEPe2hZdx4F7

MCCiesHnwCcyDf9EKrezTRMr4WINvT7W6PqeuiiJAC
Svh9jcjHJb/nMH5uM3cdNsKCQkOm1RDoiZl6GgvR/uzv+DUJz/54jL0+H/pTSBX6J/765p3ZOykIxWda

A2zC9iyujHSiKbVKO4mErji8FITGlpojxqvZj5slSL//G9p1xf0mGX0iVILRYjBj90BrVrBsBmUClq9Z

xopaaN8M2jrM6WtXkUtZf2V4fTpapTmbJLZcosMnir
DK9FZYOF/ANnlH2OMvDvQSvsY3NOzfkD6mTILum4pnuf0OUie23pw/lysqjLB3UA80az1H8wsAbE9cOo

nmb43b9yWpTys+90hmmk10otDKLr9W2rrCxqBlHqE04/RRs6y+F4WHS8J2o20gV5vwzTpTokQJ4hgFrm

ZUU8iRVtadb9r8JWry31oxVsUPLeFzqzVoIf16pGLf
zn875QAzCxrqOYjvzv+GTQw4WJSehX2T080ZnDGIkcikR3Gi6YE1hrrqLV3PSurr1LGkIYpM0Iikht08

NG9KYaP20bXTER/PFMCi25JDwV1LwxYP/52eBPSyajoYOoY+wZ9DGyDANCkdT4xDKImID24n/K8/WJ6X

IxTjKCH4jLjpFDR+1KmoNFoHA2v679RqsPrah+vaKy
PgVevJbaXr6tRFDMEm/CptOVhfmOOxFtj+B+DRIWfmFqPowyTAQ2N92algAy/gJSSUTv9WWm7XmgeJ/8

yX6zyrE80mfXQueY/ugmmyfLInH7/+NptKSZCQ6IoCRWPPe1GdY3zeFeFFu+XYD6p+OnyYJe9ye818kU

azH5YIYWYQQv8OX5QmKCRo8PiC3AhLvAF5kd3/HK+h
o66XBJaWxvp2Q6IFkNzqon9StHuRuiFi6i5PLuhrFS4+aw/Bon20rbqk/4RgnB/IvFS8e5H006EQ4Oaq

rEwN+HRQMYJdUWMKHANB54UX3Qk920U95676f0x9vpXhxNSxPHQiKaBc+dK4Tw7Ea8WMPqKk7EEtgUby

lMUay1QhFYAvxJcy6GMVdHtESX3u4XD7o8u47+3Af5
JgYM9Gr/CpCUbjJixW9wYx6APdiKPDDuGkHmX9WPZCCNFBUxsz3McAeXsfmGIj2TolkHASEXK8jfd5dI

ctOWrF3SCpy+MK40f+H5FfRxmRl1s9wxYGNyAw5XOW2tlLhu42RSrZTMN7R8EJZj2wiziLwIj7GUA7yW

RhodgcBW0Do+0TglWsWrsA42aC4OhSR6XJiSIkHZcm
760egDzQqLBMg2GfWLk3K406+vl5xE3STHODg/o9Aebe3qnz3E6u+gxvmj4u1co9z1ePKOW+9cHIl5/y

3DR8r8jpfb9giRHb+uVgeRi8cXLVFw6U+ViS/6wNIItxmAAaoW3dATAef5tX+LtAxbAhFiyPzxbw08+9

HsGWX5H0upm/AecBGxhG8q8rR2j6H7zJFKB3S/oZ5s
t919sW32erSxxUGK8IF6IcN5jyJpYJXlaR2+BDzjdufdHQIIkZvO1eb2M7g3CW5vhrE8Jc/SoJ0Fjo82

ZJ7ZdxrlAZp2fGf0FLakguGD/pXR6+0SrmsAyP68erLEKue+GL0DXE8L/q90+0PG89sIzXRN0ryUT8jA

0wenFoJx3x1I9k4zRJ7LDQ3qfn2z2nMydu0j9QBoIm
KwCS+7OcgwbWNPnNw+i0Kuu6RxWQumNdEbDdje4vu6kfshx4R8iIJdf7Q0tVpKxMWr0hcbMRIweU8ZOd

sosKVJI1NXhI4FGeCvshVH1FQ2JOU7pXzQahqx4ZgcWMj2e6Eyc3+1SoTvfi36VPZ2xXJ3llDHRo6+r4

PoDWTR3Mxn3PqNnBgaqggwa7vYkaUWQrt1hRj9mDuX
1RF9O6K7H+hXecFSgaZVhk4mXe2QNdNrGKFqOJXnbfmK0KBfZAyS2Lu8YECCsIVzmCffKo+wxplvLFli

6rEoJsbGlYlCJmqGP5Jz19/l6Q728r/2G18U4he85Hb+285RAXVFIChCuHGlFJ31trbpKeCycIq8NJtI

Tytcpy+qqRUhnaVIsC4UM5Ndn+MFxKyp9mFwkG1zGu
xQ811LoggeaQFnGzwRARbRrtmGjC4Y2jjjfUFxjKKD+vioHEgSbTK5AM42OOCM+0q80Hud/PBxAHeVJo

bmcqRAUUWo4b0r3FC87y5asaWqw1uEFV6rHI+H7JyoQnHaez2bF1LzKfnmbftH9Gb1B9DfKWlGqeOm5q

BYmCg/RcpBdI8ohH90ZbaNvoKjtQldof7rhS1bcnHE
a5uUxLrfgJ74huAg2HrZKtGv0rz7OxDotnb/d/c/i7HRJ7uak5oyZILkyAbPg8LsKZ4aknkhlQwEwm/f

oHZvUdCs9TPcmN587n7B/s6lAZ0qLs0xKg/hP6uOT8Trz/XOp/9vynVWS3CFv7aYxgQkv5satacMV3gU

6NUOMAvzamqp4LzlyPFT/C55UbvfVjV5J0Ti/LKj2b
4QF236QywDHKR7rxxyJ/bwrNP8WCN1cIT04nvKhFw8C1yIJy5u/Clt5IVfh6a/bxkvohkzv8qtKgm4fv

SwQ6lTtq3mXdFe49ZbaCpcGkWFSjM5N8rh0BVd+Y4YGxjbVw7Ec6RKxhCZ0S9zsbsrSw5dQN0o52Nw1W

4MO6eL5pacR6eUq5u+uBoOZWON48/Wsd3FCvwsWjBx
fChlRoEMP44dvB7Md6LIuxK4oi8APK13xA0OI1sopTGeB42r0sM1ogkACh5A5XJpM/k51OLDJNetFcCf

CQOPOcyt4MzyC/kuvNTNDe4JtfRne8qEjajOjcHPah4xZMJnRRPk7pSZy76YdXgrhGAaxgUiX5ca/AaU

l0xqcMU5gNb4WJRZM71uSsXpSIMJdEeEF4cB+A7xd3
kftXAM46E+G9K248ZufHOpObLG8xdAt77CKCt3HJvyipRtrqg13xdyDRo/SOPNHRXkoUt8jfr/ChWQHg

wzWeFLvtjBSD4hIhv8BwSnoBElwbY9NN4LITVufjeoDtWyiuK6lqg1vAHjnO3CcrsBl8CFoLtwHoFd9W

6Ilhdr1rnzsbNnV2ErTWrxbcypD0tx3aD49Ul+rQnt
gLoVsyxJgFPmqxPWVpejxGdizMXNOmT73887l7dIThiVV+qX+Ritwylb6p4sy6EyxIyfmowMSSZMHbNE

SoRp6GTFht7jhg50OnCXGxmg/b+hd93lTyah1q6zlE5OBxDEms62lfX0lQdJA3apZawIywaqHoy/xI58

zcNrfjBZrVrqDXlJHdl6UUNfnH3d+w42HXR7ffMbeF
3sibSjsRLAweGUnSaL+K5ydcDZViiFtCMztQbLhMs6j/O8YDGDqSlUpLckSBr1Wc5SWLZsPaoUqrThg5

1m8DIjC596/SQwPrObvDeLOBz2DtP8/XhY3k6caZGqre4G5FTHuvaWCT2cK09GP9C6uS38E0B2us0XuC

dj3bCWMc9A48bVP+QQs4NfZQnVqsz7UFJlLEEbNcqu
BwU/wdPEWy3I9YhZ8v7U0bW6nK+fvWudf8yR3+cX3R6QhaueKx3WO8ghAI5Td/obZXnWf0OfhDT3vHtZ

dy3nmERf/1xbSgBb3Tcim3QNJcHJKMp8SmNeUKsmvwTUHNqBV/BDJUz8n9lAlCTSiYejKrowh4jR5eT5

6CfCmIHq6uduYAwKNTsmEZNtOvxzShU0EBpQU6xLLf
Po3sr9bsGFufhAHEFWlaeWDkpBileXXTt/RpVFk0bblC63Df6hXI1p4ciwEpPr/fxK2WpC8V4lwJ+W+7

rXobkfdZH9OmQv59gRlsXPX0ScqDwmH2Zl+X0TweEB4Y2+z/bEa1Mu2LPuXrTjnlL4nlV2BEpXn6ixTE

IbjJgF4X0pT9ZvdyO9hOKyVrwjP7prHLBCN0BFgu9i
Y1xMzxOHYyHOM0NLNDdKAZPqN5jrXTiaCfvs1IDQFqjIAdukh5WnhpOAJwe9ZQcc4nFyEpM0+fxgZY1X

cGreEtMharttznHYTCQoZx/SONll7oETJNXcH9j9y3hYpGvcoXcveiC300V/midOjGwWA78g2dGHUiFG

N5I+E+tvgAk3BZRka0HZHbliZuzxzHvk7o1MVLToS1
1Vp3RXDPNtRuaAY0L0Y9Xwa4Av6FyNIIWL4M8o/ODyyO1CTBCWdrHJ0kothxTJ3a+TVM8xL2FYzr9x+C

Qk5XG+rU76nSkBfjLi26ydTE+wDUPFh3w9edsBSqzskIT5DOse3jneBhz+m76YHJzIf1WTibvSvajU5w

ebOuVonVkUqEp0SWvA6a0MTjnkdCMBjSsj0KJGhx9B
5fjPqZBgajK0tk6sgQ6NBfJW7rieB6YqlBn2xErcvmOagziQ0FAWsbL5VxQX5ElTaoKzgBPDxJUtQ0cK

S7Wc/+6clPd7/BbfQQ0Q/QQWmlq85jYpD34fG/LyBcnSgpUuxv+bm98Oo8GcAE6c5t50tF6FAyAcniAS

2CFA3bolg/mNLUWK0v5CIJ5Fiy08gvGoDSqfrx0aqV
ipEvSnHwBpXa8rEF2gTImPmHdm9YWA6wUxTySLer/hYQU78MDp14PMswF9Hli43OGuBkiL+fKG7838yL

XGCVlfJP/bPpJEEi5KGGgkDjl3IZAxTjCR1JLMh1b+gOhF0gHVzKtEPz0Ti3nurcDCrAzbSOcYGMIZoq

ccGJoklbtbLwrHV/K7Lwz/zPlyqnAJlDUQXjoSeeUS
0HjymeufaJZFg5X70x+Qpite9mNYuv4x1iHw8jpvG941NsBcJ/J9HYlhTGkj5x2bUn7AuxwJDuq/Aaliyah

eDwBcHI8L5QmvpeVBuI0IL7vvTSWITpOdthHho5374Ul7Dok+iZZ9FV/AnMK+6NfWKTek8bgG9v5ozvD

sk+VjlitqEeS74sKkWdS4kTcCEcR0umkCGv7w05tef
XkR7j+oLf0sFNG2vkDU5RScRC2OqpRksN9C04cDDIzKNsFAHjpmKEYlH7RWBPuCtGJDIgyaHTXqykSMG

CV9HipDFmMmUztDofPhY6kiIg7Qwx58z8AuVBrnBcz3KUB0j81qh6tepHJ6845ryI5LOOCRSo94gMVND

L4/laksYLBqKScdg24iD1+PDX9wH0C4Y55AQY729pe
QUtsroa/vcVzE8l3nWUWXI7sQD9YjrEBGirI4pV+xD3yO9zxf/9aGS84P+zYCUVFsizw8geiml6Mhf+8

cZU1EJtOwI9NHWp0dxxP1IR0vpstA7b2iq6McWz7BlJO6CzaZLYUQ/suxJRtawPy4STJUIy3ARbvMdDk

a6MAFpKYLwxko10u6S8ftFr5W3t5y9jKg0z/bFIL3Y
xWEAfbkJMBQZLMa610ID40ZoT5NFtVZ0NXZ2nlN1Kgz9ZA1PxF48tdXYuoHFI44OGP+pEvIgn7EgC4yF

QezidfEqxYLHyehtqaMuTS2WzbFCQmXVsPANE6hsV+7zbjDkn/DRQd19Xd7unD3ZjwukTrmREoR6uVGC

XybCSWd6HuQ4xYBf+9CgH6I605wwapK+DH9W6He3Ao
0LYPZJYYy/AQ4iXrkJUjn5rnFF6u62rL4zTOxQhjh+gQsqdy1yXJ0rjPXGblIKdA3L8cuhqH8Q+NLe6b

w+MWuQe4GaM93lK4O9PLdvUpc75na0xDdotKqOT1N7J+4j4SegHA3EElWxsxf22CII8NRpeFx9UMrMa1

XUPSOxX3S3/sefdpCtx1cTiIHfSqxd1gAU6GknIoij
rNkrbp2cHZErKhyxBk0nBW7KeR6u9kjd5NYGi/C+8955XXCRKmt9U5bUrjQ4fN5/OR9Mm/B/ytuZjo6v

pFi2NWrhBV50kIYtZScZvl0EG8WwhdNVXulvoZDgY6w9agNoO0gr4oSy9u+WGrCP+yua0p9vP/yZCqFm

JqJF7L4H9GkjByc+VxdtKMdE+yzXP+9bcWPaGtlJXl
MSzkdYOUMB9KVX2p3kge/u9tXrL4+z+PXmcqiyCOdo4qgYTtpArtn2gybX7jvdHxWO+jCbFja8EPS/Ba

mkGDovy/I6srwQEXmsbYZOdaDNqZhOnIXAy7Ed1jwtYHr51hSFDgZUZoJlx4rLJdPejLzcPZ9XedFHSs

unsdoehDMlNAv5gSzwLb94CQ+ftlGBqQ+MAiIw0kqh
fjB4hNHzF/G9anRx03Rwy6pSC65homAFyNsX8L9e/Ph9tep4yAjfimDsIwcLUB74tSL2EbZRzmHv0aep

qHE85WTN8MNR33a3RdFr+gW6e0Lg7DgdXXwWUB4xBl/Cz9TIVv1IWifSyzAH3RsEwnQLx/0G7ghGk50Z

NWrjQlOIiQz4ovT/QhSlcJafV4MS/1XAFx1sCfWi8m
QvI3ciXwLzyiQTOun9ffXHczjbc1GjvgNmzdmf+QBnLbMsw0B+QoGnrGq85fp0miPv0DuTM8HKFLQDzJ

02SMBeh2z6KLOn0Zjy5ChAELVZ7ONXINqwP1guH6DlxfwId/D0NFGkqWPvFbgOZsHg2J2A0WGacM7GYR

bU+GK1sVFRdLKTKpWwrn/rqfMYtyhDANT934hlcEIi
8mE/HTyirXxVGJ2yvTzPPQp3PH1bY4FQa7c5ICyQYnO958AMe+U23T6c6AjvMXyeaBMozUSnPjHwtrkR

OQew/4mm5S26H6mKM/6LlxkzkiM8ws8zjDIgi4cAZg5Z91/yrMJtoV6EEdi6AwHOWSK0mjUmA3JpOJk9

GOmZUz8saqgoCD/gkuUb0krmLHVz01iTeSp/Qqc2oW
Sp6Tglz/YBtXeAYuXT0Vxs0tSW9CRGPPhD/6faLD8mAEpHlgvmpG8kWkz+nKFNDu1N4irShiP+L8ciPu

CUSAp2mIV1ad34i8F4SZW1irzk5S1vb1oyeu0wOjLzhJYKldUYep7mjLCdDsobUo72z7e8DMvUZJMsfT

AJ4DCOIn9NCmCV0341z6DmylbHjIyvd15Iy03KE/vG
+IPCnyfCYtGac5DepAjgm3zc/j9aG+WQGsTIVc4kcPV5dgNlAHI8OYBF6IRgFlyqcN9xzbCXV9MS/5r7

yxc9rBI2Hz6Bbmctk2bOefjDjaLe/s/k7AWs5AGBBWvX3dkSmEUiG8H352l805cI4pfgUZcc1JjGiFUG

NmmsLuNPTWrjUSzNPGK8Mzo5+3geqXHzZn1iyeM8+v
Q2HKb5kbtJYX1boFzGX1XM9EzvGj4TXUqeiieyXPwCeLZPC+YRT/1zlVvF9bnmKIuNEjbpNcLXm7YaTi

vfVLPN0peiHRkAiAR1aMvNav9K8SstqtxQukU+H3eKOlFYkr1ebj9IH2nIsUEV9wyLurd6VadjYwj9Va

RyV7wzufx/QbDgBmUHZdZbAMKM/vHGnaU0h4BXm0wu
SouRXn+QcNSQY8ytqNO38xTd9WDu0eUuTPWAHWR1GHw52PB4yky0fnY3W3YIXgEx7wTXMcGcOWu2XMiq

PGtbeg+dmja1Nv9B+8ii0c80HD4dvgms3U4B30CfebneO26anQkp1Tm/T2kD0/jxKfdcwIZeOG931qJ4

F2UILEqkMiSeYeqYgXHOq7XIdOklZ+I9WEACx032iP
4REU7BXJJw5Iuf2XbjqDjeHjrU6C3HlxBbFtGEQFO6G1sM+s8fHUqNch6ARm0MyOAPOD6G6OSKyk75vQ

u/+ycomosVhIG1pIhi+reYnmgSi1sqmSic5E22TN/aw+qINNoUU+u9LDCBwWeG8lBpV3PmlL/PYj4C/Q

4QBgXX7YUU0uk4IMqkAZMV+6uIjyb7maXjX52wDsck
D9icaNLw5iI7gITNdQg7RZwKwPjfd0BTOHc8kbL42KNQ02Ca8R4JRsFPtifrgKb1XiOnJOGeJ0kGrFhD

XJ3dS26RpTwHqDWPi+7QMqieo375JJfEdUMsweoi5RVKqwFFGUv3r3zXLko78pmdL4QUrBTZgdCwQOuZ

KSxfgRkY0hyOhCo/wLK6b0RNXsAudHUCgShWTl3cZo
lPsapxB9N1XqAM3G7t5CJvzX3MZXnoYkVy+K63G3BgZBiHYTKc7pKD7qSTSVhysZ9OwDSMudCM3ABjEt

hFX4p6COcBvQ9cmNfMjOO0r7UFeCW+bTZGaJ0sJe61E9lQfUUcSL+YbCEf5O6BxiBhZoSw5Bfe4PLb7X

3Xv3XRkZ9wyPJnvLPDlyZrczUodkIEMWmjt1ZoY4kJ
MHpczuMG9IJv5za/7cwvP2I/hfr74LvnZ7Mv1VkEcIR+BdEM8BM2d24bPA4Gabg2ka/mgFpZ5VTtDdEM

X1a5cqVvQra1zsmCmHE5BChyz2VI1ocDGpMYD/syzJJDuCyaNmKEt5hwPJIE+SA2Zeb8SP4pb+ITYj71

xEelcWsv76FdzWWXTpd67O+134Elx2zTFVzvwbHmj6
86Ir06noGPb0iaTQAcLbrXH/6MsVnpOOQ/JYjGOtvm45ObbJXdg/fClv2VF7H2dogfFsQzQqBcehp+Vz

7iSlW34bNIw3TnnxkIoig/9IZzJO5lhzEXfIW2h1dSHvilLkqFHfwlr8HbdDNNPd/eVwVupozLFI5hRh

W689F86mqrsS9ObQ9s3+9EEKXdFzGB9cw22458OUT2
jS13NHQox8k8S17LdaZfaEBi+qTT1RcJ9MEIYSPR8pFf8uEwUzjL5z2owJ/mK53+1aKskPRs8xkTudE+

VTxevt2skIpFEEAT3kwIMQgUkbmV36Ja3IBtTC/z6zt+52WO/2AulPA8jbSHcFu1N6eU3hFK4nfO/7dU

5san4HJMfPIWjAnoIIzT1ZWj+3NN3Q413xyOvLy3m/
4JhW8+MYK18QdwnR0pVtAOZCj0W4UvZMLvqDY2R7zlVad6p2+7UzdwAqMAZXsrcV5mZ63wJbJDyMcEQ6

pYNAwQ/FkaH5OQOvbb74AWmKFPquesaP8lk0lUcbj0vOl9jN/2O8BSWpETBhXqARmwhwLoxxVYHQxR3W

vdA3azqBwElWSoNi5JbIhhE60sygP+eFNbrvtEeNeb
4+uNFQIElQkVlha7jUdt+yq5mcN6rMlTJXkrWCwyjhdit6H9AMJr7I/ymTtjpQH3Sem0FxlV/WlX4RM9

JEFOgQ5+FoGBi4zNQJd3KotmvmYz4ktF4TjooxiIDXcS6dl9BRbw8mqu1KB7m5RTWrS48+zR2bo6fbhX

hWS6AIStQsgGewc4mjcunxPK8QSzVbIMIwJjMkMG2x
thkqRMZDgD6ycSWEPyOhjFcnokaMpdOotdk9TdW6iamtRhT0b5ZI+fxK/6m+j7CXc5PDQoaYMfTkNmMp

xnBrHVqhrdDPGiB/x51rBeMDHV+fZKVba1NHvg6DjpOvFZQbuUepXE1FywPayfTR9QB0SOqV20CSwLUA

c0j8hEhVe2Zmhu5yr7SgM6c4nxMmdFRERqzgW4lyrf
efiviM4fsqdQLZi8z9Y4vwdTMV1TWb7Ujk6+u38ibkhteK9kqFCAl6T1PQ43QBpT1BFfooVD2fdD2xS4

Z9ffLuyNC9D99lW6O7nP/CA1YSbMnJ3Xq5hkMIFfIaWAOGwBMjq201FjUQfgbVUZI8b09u3RE6ApYtcK

TzO7up2oxaa4xz7MWjqYGGrtKnOicIal8ByzleNXHp
q4oPyfF36xNFjkPcG879O60n1/wTzq3LufQ198MwVuhjnxDP97gpb1/NPzGvcsrb76lpRzrDf6/h73Lz

911llEHT0qPk4fauNh8UohZ9nYNdb/FO8aEIjDPTNOIkun7lNvDdYwORwibz2jthKBBLdlsZQm3qrarE

LGfWVft8vrsbsDGOZ7ju8KLnDpROqoFmpTSBP0oDZc
OA7zu44AmhAfHlh6r5i55qaXU80i+D0Q3BlyDd46CM63IJ0fjq28+6DfZgwYt6wmt1lzdDDl/GP2Ghx0

ewyd3c3lMoymYFgT1xu80h4TDepYwftOnuJ0PScs5G6J9zQ42ka6YD2+gJL5tZgxcvPhS1Nr4l9qk+jq

8OwUeTifDD0mt9UvjkyU4Zy3nyL17n5AarbgjEP5//
+pU05itW/otsnZzsGlkZrH2GwDYUuPUKvkRmmwPLUN8d/JtKXTqMIIWy6s7h3xzNzT6xRGax7vrAqmF0

SpQuebMejyIn6To5OyFD7d8FxPnKaMgHUDDJX8i7Pd/g1vEL2fIxwVncivbUSefkrssCC/sdXn5hyLF0

V/T/lBEJr1/vQIBX1EIKEHXEpothQaF2WoRcAjeVmj
YBdPj3aiufXZwEShJOj7fFVM++ZSgnC7GFJrFGc0HPjs3m2JBNLMMSplkc+cBBhULUC48SnTzaKiedwm

rTDSBdg0mo637t/svp9vP6z2TJbrdoNrrVCs1MkUE/BoLGKxRMfabBQ7XwhH9BYvaT6QhG4hnXUxzBY8

nCA0/Y3JFvP1lkPqJ8q01ejwtJj59+jAypVLa87d7V
/FHQ7lp4coAvKJHgpexsJAuOTjtQDPWneFkHvr9S/yS/iktOpwYdk9UWrRDZf0Vh719YdE/4Gd64dBwc

Wt9jx04phYr2pepdE4twCcIAE9Txw7TWLjhQWsRt+eMJMBbbrc0bu2ERb5fOiQZ4QOjfT6B43y2/eh/c

y3/TkDLT/ALexlmOyNP8sgzY5qxkjjanElWLegBTUa
M5Kwm4/+UcXVoshLtMMBAyQqYoP4uRHZyeHDXvxj5gJHz/1K7juhfhhaw/Yd0iLITMRoPIf4w0RUlEsI

f2dzSVyVonmxW7y/IMXk5asJ8vnwJ6eRt9S6OF8SSrYDjpgQkHZa/UotZ/xMJs1XHpcadava9KZjPFv7

rvT2bikBWzXHItuxbrIT0+G2FW3Jovs0W77de7mYY4
YlE64A1gK5Pwf3+j8l+ib+Lf8YMMR5kwwJ5s+X+/HkIwGs8BM++EZcRy+hnipFD0D2yApaYk063y8aXc

wAjrrtGGJ4O+hwT1cW55F3a6PqdOVua1Wshdq8EWoZzeqnx8p6TntkWxbsChdp/GNA/rw+M3GMcJbch0

vF/5YUXLwKxIo3tRbplh6PmqR+vswfVkF9N8jiU2j8
+IQaxac30NN3dGHNDqqF9SWoBepjm2IbQGyDFiP4mQjcK2QIoqw+BvkoVCwxuaW7oz/qk1hO0f+Eyuzq

41gs+eWGTUBM0k+JytuV0sjFUiSYPntjceDDeD0yT+WJT5BvkAY0cQ/y8YkHsUqm1I36r1cEK42mpzDO

WFh8xh1birc39kCjfxrGh/ynPlAkD1BrujRUQIZAPH
eYNWZPwnoySSM2wK2TYMFbjN7aFPuhP/CpPCm6OsqvG13gMXgfrhwgTas10oE7/q3Qfxa769lsHLUqls

ntonHgdMdu+G6qUE6R//v8o0uuqv+l57xkMxuGvtOYX91U9HsbOe4RCNnh5DObqXTlUBIpykaPiYCeZj

EtXNqOx2OvMBT9//GWecc+71s0ob9e85J+yt746l8a
vP7/w637Hb8cS6B8m01daICFj2x8cdJO7ViZdLt/hgIlO9B0NdzGCqtfjfd6dG3wbICqhdB5x9Y18d6h

OQ3Ry9+gZdMa4VlwwdZv1wYt/D3vmhe4npqQ6mTMTWAju8IcsVQyv53rYttBXtbaWwj16STYRxTWEdl1

8Set3GRJBhd8+eyBevu9UR+5JQF2tZgThJdxyekb+i
I8dskar4Rb8OUKGANMG59B6xU4IojvP0cFkNELAWdqh+TcbrovEfO3iaJo5jb/jhIM8v+bUE28flTvo5

LQ/0bzPFUTu3wuUQiPPnHSgpelIJ8ytIeBUHFQLi+Cx+x7ReQhxhfoIZz6CyjJI3z2aMFBet6ANksgX+

3OG+rv3pwj6QV0cxveHYgIPfmHkOeRTX4+P9WQdExW
843cq1mVzPXbdT9ICClPPwL3Br37ibfLv/izppoEWHIq4jJRnAz5PJOdaJfD/qT2EFOPI6dwdHvM2+Ed

rJmhrGRUmj01oJfv4s0qZxhMmjEbrHpepeV8mPjRyyyusdnP8KcZBnoyb8+4V1QH1MT1UrHy9S6Z3xkC

+s0Piiv8RzOo2bknCEj+DIwN0I8wkvKBCkaGU/m/ls
EVbJNHrPAseOJKV68G3LDBDVLw9DiUlliIgDcfllxEL7hn7PHL34gaM+HwUE0OwFV7M/68xY/1jDnC8z

IuWfEWnMF6JnX7pJ/+TU7n9ugXwRF6+UzYUUbr1dbwyPHQqaRkX8K6xgOi+dJwH2inG90HT3uMMfJuTY

A4PXfTi4LeWPvrdlv85/1U1ufLgUQ11BdFg5WLqipM
R7dl7bJEL2JqF70oZfWOLgV0zYtccNM/CLi035sQyigIIOf1ZGeE5xVoY8mf5bnkQutvO7DLpzWX4Fhv

HstBUdP9o9fjR+C7uEu3jAOgrcEozFV4NfE655CgcvHI4zbMv9Ael4URCx3hqDBsF/uGq/xkzZmcmzuf

ox9lwgJXa/BbIrvn8JE1kSZqoU9NybpFy6CbOYF4/l
DFMjX/yOJvh/80przRJeKBH3b0TgR+T8rC2ME4UGOF4SisZSU0O3xk0eE/Hw4/MbTlyyedMkVOvVktrE

AFJbmeD2s3nAfPXvF+ldBxtvEcfXG1eoBRejtbuguoAjazunDBC0gOehXDIaWEguwAZCkPqRUcju5isS

md2JLlK3dE0Xsy1zcBMcpAAvJV1sUCwAbvL6WYWXQo
Da90eI41g3yv3lgdOqN6y0VHTHH9tNJFA+vY4ixjcRTc+KrX9rEoyYwtqRMtyNSMHYkNCOxdLWcRtzkC

6lyoRTZTDqR5WcN7t12e398ntgY1rpLohTv/T6sy3f6de7h3AXOi+r2VHwC239rqHDO9zLJ4nKldQdR6

bonds/9Gs4PwggdMay0IRqsJn84p4x2F2IjlsCJtSNN0w
goPvB8HF8g4562LZYDBQzFrZoX4fi50b+nT25CgP3iyf90bPI6S/Eser9aAaPkHL0V72Lxxra0WLs78S

9JJoJ5cjatv3yyPS+nMPpgbQhAIzumQGyjS3xaeEk8B8Ln/7g2Tb3Fl408N8/Eocq/CtEC5m+gqtMeVK

08L6hkt5C9/y3OqV3/yXE8TfxUqkPBK7/ZUM6+lIlf
2oLVGxoFbIMkhxKvmjvK2aiDV9I5q4hXDpXx7CSEMd1DvWaxaeRzuZq+8W9zZuHxT1wrkRI1eMOqUNhe

Iop5s2k4G0QLODrCxqjkAPSTGoCdNtAOosu5q2RhGnLMN6pIu36uMq3U/otmFpMcfcd0fsHW0m6EZFPW

ojZqkUl8nybVDTJTFFMNeIUIldqlf0PswHUUe52/xT
rsZ9TZureqPzN0ZBV7PfN+LpJN2TGyhzOxN0L8fhl7wxg+YU7yA6EAUT+7rk9s3X147WQjZlITLm+1zY

1DVYV+Gt7TjK3AttclNc/VzzF0j2Wme6e/fRWnWRBTBBk7gAN5LMOYh8V1kc7tgvT9cBCeftKHSU9L8H

kCoNV4F6T2HnSuwH52zyyNyLRZTkBLLJ5WLj3IlTsM
4i71OKP+h6CRdZ9rPJJM2+7pJzaLfGNQBiWUSAJ0IPYzK//nxWDGzxw3qY2gyxG0WfyQmK4ByaapklSv

UqnlesKp+6LFi/PAZjxNfNPNHXtQV2pKyKFQKTIamBUTdLzIdrkPEX3F/ZbIOV/Qi/3bC86xFpMEne7U

jC4lmuBclCNi8Yxd2NzU4e8hIqQxQy166vl+IkdLGV
nf31SrPhfhTgrLgKAx/VuDZ/imXn75FR5hJPxr9ps/7Gz9iffTnRzzizgjzC/TApKlhTMVWXHuvCEdrt

bNctiR+tcEiDgILK93GLFLFU3VsfLVI7uDDagehZ7cmSNwY6I1Odf3gtvmTwo9XrLZouhOT3hH/Rgx/g

7DTtz7RVHeCFwTyiIzVPwaAiEMwxpVzamwyy+AnR3M
PjUa5MRJU+XV3cx5u/9E2tuugHfhm7hpuBB96wqfOpAR9Kc0IbXSzBJGoMzUlP3TAiUcVkYgRFFxU8+M

xY0ZTPD5bFQ+CIkA02O4GojhjdGSYteSVrRvUoU33QOpnYIjFs5MMV9cjxCfVV8rELqMt6a/Mk+nMsyp

U6jjaXcfMbH9pA7H5oNIwFfPeF589dCZjra7PrFn+2
8WAUMM6p/T2yWIypaavw23RpP+/zLN8GQwKS9/pe97HvI3/GVFn3pus5rSGmLWN8ke0z3HWSNa9Yyp0Q

LtlLeo854xxEc5u5jDDs1lX+Mw1dT9UO5EwKJtRaugQQsbegcIXxeOfzqntR1qelWoGWVmVG4nEh1HLg

mhxh9+LZR87cTAnD0I1A+ZImf4PdD6p0sOi0l8U2te
EthaE8jvKE2P/5hG8XAW8pBzeFLmTi1iQ2S/7JfyYsVKxYuj/s/ic37JYW8rsg7VGns6SOEzWKlObXwm

3IF4Cm+e83KPqRxbk7S3c38xzCCTIHlvq38uwzx5c+LCMPl6pPDem7sVJpfclfw/CCCpOU3f6TPo8skS

ETV/pok9BC+zsTFHPUc5L9gcjBsjU0NNbalI1ue7Zf
+a09fc9zYu8Hi7y1ZwbVTSj8CCVxcmlcPHNaq+sD0bkwJwklr9W5Ysak/iXjk+rYzQldzFjsenuSzjt6

B41y2ipWWZtVTjxxSOCYU8N6O9rc06Z4ucfM3B4U0qORkcY0xJ40BgCtvuRy9w+nWzKn7XsYhSR2EvMk

fzBALdKunhN6lpMBSSBqeiMsfb/PE+wxgo6WMu8T53
oa1s/uECgWNHZzjaggIIGT7I/8urbwbeAc2nbBNlScPjtcnAXUomot2yLU/lgPqOR+XVCi/Nnz+RKffJ

DLN8l+qz1dj28upPgLwB0VmHHffdrY6MmdmiesL1ei7D3m+UEvla8VD4kfV+AmU1A477UUTsKYn9lqgy

VFuKJL/hj4RLrFIN2Pglv0vVKgYtfOWWmfFW5CDQZO
ILIsOp75aXYmoAi2pmbKFE9aox9yqveHHBdsrlfgrnfNSQHzfzP239d8zsEaSausYy1cvUj4P3GOuvAj

HDezRYGg/Oaokazczsptaeevza+CsY8G1otenBeCfqoU5qGZQ4bGahYQb+W7hXL3iCOyqR0m7Qb16zPF

O/rjJ+X1X0RYQV8Go5EJ+xUELXOlFi75QaW7umBVbD
BMDDS5G6nM4Ysy7JhC6MZFLUiS1DEi7PgVFGnVq0HfJabsVvGyuMIsPYCzKaz40SMzMhCdwfhX8153CF

M1aD1mRAp/dw4ZkfjYQU3jQY/u/S6Ln3bocfJRsXqIjfcJFuKXOEo6ftgJyw+AM1TMqO0yoAZeoNNSEx

Pz2qcrY2ggtNSJ3fKi+tfGiLONHtqXNCjMKDd9BA7j
2WsYFZNFeSl+ji+UPx0mXan4TZDFKUVq6CkaNrJS+m5dbpkvKPKomi3oa8myZma+/lq1n9EE/xQViN5L

lXCJlZNC1hCS1LDxMg1G3vCVpyUHExl+jjzWCiLdbMUZKdQtgLIhfK+p2d2DDFBNucEV8AUVH6/+a/01

aZOihG1wavGQtwC3OfG6q7lUXonXLQzPXTBI8hQbw+
KVY6DepPs0+v3ZOOeEnEnEz0vkTs24TmI3Yb0DKfQfOX4qvdCbX3YDOVL7fEdvEGwoLLUrOmhN7rLNJn

UB2AQtv8p6CoWSJnnqT4fV7qI07afZSRDQDRS5luE5a8/Y6Sr0wck7M+6p28zrTSPqw3scOAE4Kl5Ead

ySSmuIQ/l8g+nSciY+qKsjPFnB3wriSXorIByTlCT4
uNBgdHwzn5bOf4jIniuvtq5/DvD0kS7ZGHb1glib2f9qBwNU1ZJPp4uRRbOnxJMh6S8RqaO7oEj5kjPP

Vrxb0Pei+CIgGuFsdMZD4zRPeQZz4rfdSE7IRFxTSIdXzhnUYxvL66Ge1S98Q1PjQ1jIvENko18o4Sho

7KQ90IipbZlRZVDmxpI5FjOVHqP82Y0uyHWmrCH+Pa
v6vmYY0m1tjLpIEbfjNOL6qWO9/RBKBqwl3NU1Ws4o6lq+1tS8b+cJToTF6I/6WaRyrrwaSQNHy7STiR

XHrL2bUaT0BUA0ioc2oG/vd4ZX0nJ9m6F5eHVHTy6dn0fSNjaNDuV5UpvG1HjT9e8PKA+nVymI4IChZm

mjyFJWOa4r18ANGNAgqXPbZCs2d+h5DY7o2AvpzhWA
Hfc3GpZBxvj40t6cPDTaK4tqtByibfFZzQBbe+PiZdVJzk9XyzQTC6FQgVrGCr/zZ7Q89iJz3vs+hBAl

ruxrwTkE1WHPwDP9AQ2LGOF2P4i/xCoek//b16TuYgKENbsQ5BUast/kY3vA02LlJpGkOeh7GKTS7UFM

EHhyhEkHzweCzvCWL5rI1UC77+Mc17IpOhy+5x8DOT
GpSVPgdyotwE9dppIbXay3CtKnPzMJSFC7qGrZjFrG5pntZ0W7Hplerz1JVcK7hPuOTHO7+wZtD8JV6G

1s4dE9Exnuy5nHhJEaaE3pyegsjazlmADawxChNs9za02xMnSD5zyFrITRwwZnO2ZONPIz95vQIabagB

LtMLhM9dzpyBscgtyz/O1yhjd1iqDsO7plc/CR5EI8
rs6RdTk3lMWm5tTKQndtL2r6XJjuqrzsEGGh4Xysn0aEmdcT3XHTd7wdbNWaaQ2ZO1wF7NqYuUslK7J0

m9QlxnZIOF4xnRf9TVfU0NbRQ5HH4s0Up95X1DzGZi4jpN7sFecWkH2O/d4Q4K22oyyfJXvSQ+qAxv4i

UFFVTLs7OXkfgVu/NeScHvloHYgxDnTgiZB4HAGNzl
GECFqd4BrDM6EXcDEpmZNqAzdL7xci2ELQToFaa++WhQncHr1/aJDEu6sxw+4In1CpRc2gU/GJtmhGKk

gVdtZmjxqiGh49kbA5jnVxo4lDHbQDoM0Y9gi2GEyXymrJrxXdO+Q8kdT+1zN6L5Zi4Am5n+eqCuXIXY

MLr51jFI12b49Hf8tOWZ4ZUGfjhuDWlonuGGJlmzQK
CKOHPsSzHzRMx+iToCBOqShWXRG1Jnq2t/b0imCHR2Q411of1USRPpQsscIek85webBwFXOwCll/7YNE

+oQN3GZRLsmFZ3sRJCjVyFnVSvNMmIqQyzYo3jeUb4q3vG1KDu4uyDfE09nL5Vu6UtsCiSDt743jURi8

tAeZHjSA401CsfWZMKCpE8vxGBr4DIeKC3aNtKtAsD
880idpwWwiXENHHVjaYqq8UWUKEpPYgwgytuNROdM/Nfc8sXhGCOxST/GDT/9dW5nxhpazAZMZiQD/lT

Q3qkoe85vdibqXRk2Kzmxl3KWPiOegZfEEw1Ba+6Ml5keiwJmoztCfGH/WeVKbfgYO3f9uc5KtiBS7WM

yx5jIsiUeAnggLf0s4vLEf4/wt5vRwtzOgAmuNoDUx
NYqqxtVeUPlxhnP6SjFAZulf5B47pzSrpdk4OIASJZNtcx4Sby6qSjDVg/i3tER+2vgV7YuU2+C+LGGw

/FfafzsCvddr5zDIQuCzMqjpQhDWNS+IN5JamTvp7Sw/FyxLNCH67oEq5ebthZhf9f/gU1T7irsHK40R

WR4Cbhe78LJFhRicRQx2aqvbAnryXoYp79jOfim+ly
3fy28QACz/iWkgqq52MH2NK+AbqtOOIqzdUD22SHI83iXvLjPKpIDTTwFNYHlukJAYBl0bgSfZxakBpd

VctOS90e3IzgiaDcRW35y7l52ik9gaHapwNiPdlVP09ji7bU88v230D7yq2YY6LgZfGJB3adyZ7djMwc

aLOZROqzMYIhKBxZGX1pzmYVvS2O+XuBo/jot/E7Ca
KSJGfnCeqHAcUEYovy7TVnq1gFzka1pZyJlAn39Hh3QNkhjIiI3c6yI4GItO42hQq8C3iIeQkSay54Xb

eGd013jziye9yTWTanTwLTSYfbDElvAxYwNeiryr0SAG00PIcalVkd+QFEf2tPULwFJllMqaa3/aBgJL

1iIdrXlaxHlXevaKddwvVvAAA9SBpLE6OBid5MKi5g
nJhmfZfXd3uCiQMILNeRo0SFeMmmoP3m1bhk/zYVcwrEx49t/qa1xv2H2eSQWBiM2/dUDDh31ScCWHuK

r1ooYLI2nGmBW9AkfbhOKncq6431ST1Oz9m3BzkPeaUBI6wept1nCL629xwwa0Cf2PbPViMg5IOUdyqb

b8iNiFSwqJsLrq38NCJROzF7upB6agwKXF6tOwoYew
FtJmt6/HwyRvsY0FQcnTQ7Ytzf7+1x7s0IV292UQZ+QoibUYOG+8WP8ZF/3/p32rOB6shifHJxxoLNu8

EmtsdpAqiWEhdWTD5kRPfD56bkanTDk8PdPA/ZagdZ1I26mpfZ7pohPFbdS361KZRTSS+3KUHk/uYsae

ysYyHohR8066Dyo5t4MtBsrSTTtrYloZRZ7GzQVFsa
xWh56r1/cpiWeK5AED02JGKKfwZpcTEGURoxwLGVsAh+aq1H3oLpNjHFVfhMK1e1wSCQPWQc9OCR1cAr

kS/sE5cY1V22yMBjyBw8hsWk+b4H9YXBcmt7u4wXDFwdC3ucqS71mAG1x+7oOxWQ7Y58SOAYyD1J4Trm

UlTKJClHbEaDcblxwvPWOkzOzCa2+QxCwo5dqUMBUA
WX2lLKF1P5GlHN5D7Tqkf5xwIClUuITE2qXzQxYjF4OxgwfN4xAAV2CVmnoGO5NHGGfEorKpTnUxC6wq

4mEskwnPm8wVG1LlbQP2YKpxNF1t6mlHdbGWzI1WUhyHLN5506V3KYLTsYPuu7cgR8X2MJHG86RkPbQR

sUB5W850Q3D/YglRt96Sb85VIC3J8LhKviCme1gghz
HhlZNKEjhVkLvdj5Vqab5/d+rK6XivU7F12N7Ny90bquRmYGSIPjtrbqoDod+Oj4tu9wqmJj4qRrUryy

xk+mADnH99gXUv6rB7vZAlLoRWJ1jWnT74ppdUmtGkE4NZu7jh22FDhwSyMMABI20+z9KXGtGhcHcTu4

johana/lhDtqAWW0xG76l2uz8fDg+pIFg8jM1xoYJ1p31
ArtEWbTMsuWW9gca63tFtgGYmRzuxb4nAUzXL5xcU07NXRVBytXoxTEh4M6rcF9sDl4TKEUiKqzaM1Pi

72Pe3zCk6sGU/lAE52z/mwwZcN22V99m2i+z/zlsMNo+INx9P4y4e1IqdN/wOXRAffmw8Gs5998P/kN+

JlFlPJ4mUtjYYrEk+UgoQO7gZMzBYeOD6Nojqyjd30
a2q076MB1+kp7OPOeizT982vK4KVDxwNcoy96RdWArMHutBh08Domi4UOZdvRw8BIpWgY77be3IdG2D9

fqfn5Ua5RIW9OORIAeVm6Ls9x9CCj7JZM7DqmzRrQiOpl0FrSrKv2t+qA6FiVK3drboN5kTkVCpTaepd

Mz7+ndkTKECIbSp3II7ZK3V9PMEzaLr65ILKBXQOH1
nw3zgzyQgqBiPRR4yPmD27sQz8/eqj9TDrUtXmeL4hXIiC1SJxEQLCudc/He3sHKtw7tQGrFXSRolX09

nJTZwdXLVYhv18UqDBA5xL7PiKRoZ3sblzJaSY7D5LjFNnz89vBPp+US+2ieYmQ0SgXGLpGA2Teb8J7e

MK2FKKL0+nqKtqfbNCOx7KsKn5MBRhWA48xv0iCyY4
lgq7LqYYiwi26c7lYcMi6tbgsB39LqduCHqfaJZ7aHZ6Slp3+VkyfQrIdWiOouWx29hyw2DWR6twprsO

nL2bZwUtzw76rNmBMIvmxSBauj4/Rhqsq/KmC96FZr7fU82s10NOxSY0zqCrrm8U/aQAu7Pu/hqLB7JD

/sLv4aJJCjUew67hiMv2Tdw5mD8GvxvQCQFmqdvdDo
L3MGlVniVqdc2KRJtITm9xpRvTJn1pmUvcgmSD2xgVKkHUfQ9daJW9KOAL58Jxh19zg91/jFou4xc89X

kxM8JKxWBuUmTXkjuzPDOp7c8tN7jI+9fFdafurJTvtAQT1eVYU6KDCxVgwWmrPVJNaJIzb1S1IB8JPY

joJyhR0OLC2aFZF1MmQPcv+JlVYfqzPjH0xiF8/NNE
yYIhdH/izUxqXJTA5JMHPF4wZ10zUoWUURqR5QvHLth7E+scFaItl6oX74gXTSm1FsYRi2jla2FA6vXP

nnR/WfA+oDeAudJkJDHb2tS5rttTMWirqIgrtSXlgMj+CEVX9WdC/fTMvqePut+SHUi5j3ht3BIF7cRB

HmZPsg6rGROXdEJGQJNtURXge8sPtWMacOqppbWrgk
TdnNFHsPAUeqOYD68+DkQap/QSQkbNFK4hYFUVmM+ThGc1Zs3zKd7XQJbBZlPbtR+/NmkUv3X66BO95M

5K9aUTAVVkrZFIPT03SSjRVZuC9G9+d0FcmJ3vAK7j50G+uesifIUNsB59O7jAjxgBEJEIwsa0hOyw6G

x+pXyrOqvQG0pl58TUEl1kar2z5W75zj9d7094AMYs
kAFOkFLnMZbpClrV0jWDT6lHNDT1Q2lbBK9l/8U4+MC0nMwmygVi2y0IXq+7NusE/Xp4pHIhvvrO+klz

DmFJPEkmY3AbC0Q5nSjdDfHm97x1LovOj8hEOVB1UY5zR/C2CYk+Z7Z357Wm3KqdlSFC96G46DgW39gA

L4InIUqdxotx6rSrKxcp1nR9k7VapG71EsuhI0+mqQ
GJXQFETfPE5AWcgx98sAawvYasGPyK3JFG64OtuTU49ldv29Xv2Fsx/nrqqlUVQvQ8BnZfFTbTpDSIJX

SZ4pxRGm+0id0zHY3Hk3O5RKnbZtw1BoaB/HOBaZT6qwXIwQAtL2uPpRz34G9HH569Cfu6Q8sDBoHZB5

mCV8mFL2KQ+zylr/VlLzlYV6PYmB/phfpxPx/BwtxK
yL7XoAZVTKSPOElHqMNsHeIkZB+lYYbGnHEPRrQ/KPCTVGVwrV4ZcNp2WwPCwxjxl8Acu++878kW3b/z

sN4U17BR4UFzJghs3lauUOi/xi5MqEyh2z2uutliZmX9LuxJSs3AeyIUkqmQe7wBZBdo7xk3KW+4sYj3

YsOwSPDHxgV/SBnecRcv49BnKMNtWj68/1pxY+7X6q
6uQ7cj5FlejtR18NEAas9p2P7rik1Q8nTLscv9WDQhmLb9A71nkTM71HFTnymcho74oDOIS7YREVv5+9

ez/MIFPuQ4WS+bltcixh6w6uN3GDVkymof56zAWTtTr/yxVyNfHrsjMZWosKLn0QElM4zoHwaYOdTbhG

iIcVILc/nND78NP1cOblsspW21qB1gBbU1wI8OArdU
lVtRuBMeqhQn+bugFqcoGdxexjXpuw1+Vu2iSWt6JDGAT3USnuFYc8yQrifFOOLX9bhA8ZuS2AD69DK0

bf1sadzpnV57seN3xDdjo3lziRMlGqu0tM/XaS1aN74M9fzPJwOrUij7dsEtpU0k2d3aPzPJAUpwJORw

dicF1xI8rzDZFJezCyC+fh1O5r1/isR4/8H5Npg00O
aPdPrsSeX5Vqd+akSzg/gOUTYLeAl9dDf9JAxEDTL5q95NhhD7N6NoWwB2mxmGo06NeCaJfyy3AG1lcn

i3KTx49QEDQ7mDFbFvlSfaj9ntBL5rxIuWhPgwVEVSrLKyDrRquxzypoVlKbZSw42jhlx7korx/W3GKS

4txIDPt7xLuuZIDdKuU30pwuv36MAIkAGk6sGj3OZw
qTcp/2JwWYOghNsurEB+s773BMCNBNw8dHKsUrooGcLB8ejx87gwhlbw7w10UtSTvRegyi2zfUs7uaZE

ABjH98h/T8G1dK9nhVAhUXqwIbtUlnfuIuNJ2hal6DB/wfdhZEO3AAIowdu3UBI8+1EQEXO1shwCmxDo

WPP0cV8fqpctrITrteXOqBfDb/M+t6EgqkM6q4GyRg
623m5wW5SpSW82eSPwDTWvDOY7yWedw0od/Ex4z+kk03BnSipZDELwsfb0vBAWfnUeM9NigOVRj3bayx

IuOsMf/kD3Qfdbrwq+4gDUasi5EjbbyOOho0Dy3ZgKiXJ3Sk56LZ6UNeJ6M5I8E2p7HvY77Rhijg0oFU

x2MgJ9ftV1fW2QFbyvCDBucp7fgOu0MY3t0TUI/l9e
qmbyf5DQub4b3Mn4TMmqGj83yD1d0H59pBvlB0bszgi01GjYL2RgtdabR84dkfXGA+NXkENXM6epXeV3

gB23SmAyBV8ztFVbiijHrj5TbzKZpq2x/jitRbQu8llP+++fShJT7SqGBss7fAJeU6LAqE6wvSj4fxWv

/DQTF/eqN5rkcC9CHJureJQ3QweCmrSeyoBMPkU12z
aDWnXZeN9gWG1VkOYDQguW/j7zQVq7TUtKTkWroAgZm+agbOG9qM1JGbud2tmipLRvD5yW0zUQPOEO/M

c6oNsJIvxxeEpmD6xiOOG8k6oleYyrGHKdB6GLR4eb6mhB77OSA4JmHPMVKmpCBC3RAYsHXCGybaLxND

S3/0LeR/5zzVk+pKTfrDDvcifE7GpU6zYQI/qA1oae
6k6uYRa750ajzOBiOnEDL6JovnZ/NRV3mt8aqDX/rNxJL9sLhpQ9994IOwQhiHi9hOf4YhMZFrBWVtAN

W8WU3N394iVXV/JajMh8VooJRy+Ln0M5pljCHBaLZI/It6mF/3NF+ZBnQ4ddxaon2YYLBaH8BQxJTm7G

O87qmO2tzJMPCK0beKBrmvWu9aM+iDLQhFO/d+LEb0
gL/c6/PscRbB4d5w0Hn4fF0miKFCsT7/8d4wO7y9mK24yErV/dSp3A3ChfcAb1UEPQkc00P7poCk6AWt

9H9lRTkI6l4OkSPqSjGxLbvTv9xlDjC+PsI93w4gDM1cN3hITjPdTR7SPTc9a8GI65y5CU5TDiCDfW2A

rD3go8cPx8Z1CuE53FD43o7g1hcy/zRp5S1lrj9ZLR
/Wa/sa7Ck3RGnztQqQ8EfXUXu+YW9WXUJ2qDG9BkpZA/X24ZrSa03AFZTvtwvKol7cG2qU4rgJKTTrXc

nsehdAzwo0xItdMFFu/K5+UnF8i89bXNV5T3sJLkY4l+fYEOdOvy1qhPrFpifG1UjxeqRZI72qGKtiAk

SoBZsbOz9ZpVbB33Q0JEfYpC8V6ijXmCIqMxG1K8/N
2Wgx4TiIi1J9N/aggZ4oWpgz2BJc9BpmgEOr9A1paYRYqSq0dbiQVqjj2lWOyxhTVRpcAfwNW9Og7Phw

XIbMC7oLS5FoMEpdvhNtyXIY5zb8Qp5Elw0WLHu73O1ylrn8HWbrzD/j3EzrC2BKaKhdG/dDAHE/IdqS

P5f58hOPpTVtBLb7rpwoNgMxhkr63DJa3nn+/yMZ37
7lJMU+b4nEdDGzdM1LSaPIypjYfKfmYU50cWQ2pYVgmzFAwRgWhlV10u4XX/kvKL36IfiSvOj9BVx1XJ

QfS3IcgLYnwSL9D+9R6JcwKGQlX8F6lf1MoNB7ekQA1iLpzJHRc8eaaj0q9zbIX4xzn2aT0JwYiAJMV7

knJnnMSDWdr+0bOp199V3K2RhADYJ5UeyRV8p+dcH8
Our8x9UGm+3us1wjzhteL7xy7EVu+r+MVzM+v54tGg4Z4tRQ0UJgxbkqT41/7hCEYqPf1lrLVW04H7rd

oQ4pbEKFD3eRfm6vFiEgrGifImEoepejCnr2m1lJq6W/gBUbTrtFOwC5e7keVyC82RdCBZwzkNifk+az

7FOZwPfjNzlW0WL5+VRhkTgKWTwSAdYQ5m3++O6BVP
9z/HdibaTnhShLhzx+CszvII0rRjGbPOa8VsagEcAv+74HRBpwZu4AjXqUsCM5AdIu5V3CjfQF5Y/qNf

K/Pu4KNKM+N0izCssKB9+lOiX76KtWZ/TeMIDrK4yp0fFARA3bKAH1U7AqP4Cxq1UchQbzMMSrF5+Xoh

tXZv1JFtM7HxZr+MiRdZAtz8FrNz/icUYtkxkDvKmp
mj6rG8ckdxRbSJoql5w+Kx1UTUAG/W06sZhrv2vKOvpvofrAUhn3no2W8KU4sh2UmUEkfpMFd2Mhqgyp

BmKCq5GBdMux0J16xOCE4WcVBLLeVxoMhVj/uZ5hZqLyPE1/D2AcqAqpCQ80gpyFC4p/Kokg/5zN6/wB

J/hwx4aH9xOYL5h8L3hs/gF0vNM9m2wjN8/aGuFOp/
Kgd0g/T3orwGkP869F5QQKZCAkupvxpNJ6nbuolEJIW5gUFlT/Up2xCZ/MwTOT3aOBi1Y2v+6qfUJmqv

uPy5ykUTDMZ5Gr5/tFVQ0wIhUgFmTcfRqVZpWQoOVwzleKXPRp04Aby2MSP1dEnT9zOWkRnHQPaWpuHH

5NDNig8uH+u7zL8a7/s7D5s/XLC6p3jjYyggyoufhM
Eis6iynh6DA99OQYsJltT8hd54fq57fdoQDdgAFcprbEDGYJu1GyoGxua97kjUvaQnBC27B+w5aC9Bvz

VQE7H2i0vbsVfgkdWChOAlTshX6LiFeyA+xo5w7QqY6yCgKYaR4C1tGOmw/wHN9I5hb1XDQk2CuaB/Bonds

25+RQX8nvr+Wmp0QsKzuqEBMaN4nLyAsKVueEqqQ5C
0o/ELRSS1QBPRqzWUd5U37tGlzcBp+KiSW0NgNJ9TkGYiyrumti3XZS9no/TvivnKP5uC6fJ9N/2zmP8

g+syqMk88r3viL0S7+p7qvV2QxOKu6mNYm85YgjQbF5qPhkcF13JzLdYb3Ye9hkSP2cZXbba3NsjECLD

liEHDl+DEjhXW3lBFCRBGumU/bL4u+1wpoLs5bWr9v
EqV3e6Ri94ZsaDR8BXiyy7LYucIpSLAL28pXz1+hFMvW4oV1wtn+bPy5jddDrPO7GJZjHhUmV1ugTYJj

USUyyxI61aqt5L1hrdO4jyGYwDjTlDlje20+BAiAgZ8vGRFb5kf7M7EUeOvwhoOLs4eoeVnNGmT4wqh/

3wAjBD7CVhAy78nOWEdvDgJkoAm1rMtpGg8ApmdMr7
qajV5J87AvX5etGm15ayPo3s8P6mI7kaI1nHDf+toKdVu8JbGil1ebUZdlD+MptLW6qHDLyLgA/5tbw8

twtVOKVamIiEv3cEWtLGhYE4sBb+Pkcxa0cyePR+zJCRVaW4Tr6B9xIewqccw8+ld/dT44ThWerKgvbq

k2okqxeionJp50rlxXk6kpqn8u5r+W9CAgDCCuqDnD
/ENmqPRU9H8LU9D7lh5RkM3IlRVhf8Jx4gcxoEPUbH2h+BgohcjF7X+LDFWyNLcR/uG2f8fVEBFlJoeN

WY50PeEhT3deXNagCnDimZQIbfx+HHiedh6Y66zr5a3jzNNiJuQk48Jwq2TzCtjny3Fm9qFZv3j1uJsj

qY+KXU9DQUfgBaHuAjt4d5YyQuPd0mRQv8uehoiS8e
XE9aQDz2a59F/N/jzZkzpxRNDkZcQ8apxO5bOLnTcQjDCGYdeFjs1kmDwkeZU+2zJ1wNCE6XNh8k5v2f

mV/QR4kzn5HlqHsOcdkikMdcAvt+t/5zhw8MrZdKJdYWQ7zB/hTcvyluxI9/54BLIXjiJ2n4yzfRbQkd

r16lOXfvhrRH8w+QD9WwDAw1Ive60EVlrYJkq4gf+S
nHgUBbA66UDjoROG8mZTWqhS0t+7N8TciItQuutjOkS8aS/RVC/nfFToyRf0UGsa/QWwHnC5ZZr5RxgC

jD15fDO3mjRTfqth4y8F8flzKHgsT9ukuag9GigEQP3B9KVQQkC7aHF5NlPwt1khrb217qrUdHpkaFS6

t4wVV6e814odgODTYPfjXGLkanGs+qt1ax/JbgHJfs
3Wn4MF2FzOurU5WZktThhdkSvWAFpLOxcIzkxn825u240eIQvkukRxx3aT8XoOJ/LnaidZ8WQEAEOH+n

D+30dCKU/kfpbatWzoInfPSP4SE6vpsMWchyDt4hOiN7JB3/WIShX5OES3EDLl919rKEzYFUYhmb6ied

z+TFU+AwrxpAirPqE+nyoFAgkic11YYKDU/dCkNhpZ
4KZl0gjmGvfdJLvjrNj1q5H5qOoIs4tvPaYrAeiIWc4gc2Ks+5+d8UiaGvz/Z5JYNYBiZ3XY17MGq5ng

Iqnw0dWI0Ge1j7N9dC4Z5Jx9rqlxCkz5PA+4ue2h4DR5FS/z4O4yspm67OU/5y1soyN39C4W0RURjLbW

kZyB0TATWshq2++D+c3LXDGMqopbtGTHPnyiOSZRgY
lEax9ugSbCixtauHPeI7zF4c+1UKj7MO27vnf0uYmkiK5EgN7z0pJJqCn2k/RxyjtkQ5fM6TzKOcbzh7

+Kc7odWjbCTH1xixgYSLZjp4uPfwhc+w3rt27tiD2j22g8/sevwJ54neV5JAs6Jhv1tDaooe1V0fwt7A

b5tyhmGT8g4qXCesLVUdr1EApsLcBoAPLujVtMOjSS
wDa18IqqA4Kbl7XwV/mxOKB0gZupTY7gTM2EYV5KfDl3rH92AYSjBkAkor+HyeVtVjtQLiJk/2a9Uw10

7kcfH3gqzm4WUPgZjfkysUFbJgaFIybNuLqbcsbXfEB/F1fLW4417C6mzDzpSCXy5uzj0YFGd8q6VES2

YXMgUJU8UJB8t1R9kLbV+U/sH8BV3XMDu8qC4Kzsh9
TcP4+9kMOx4MqjNJ4cY0gCiEJjLjAEY8SnlFsiFsdLjrG1pEdw9ld5plseeau1T6XWMJz4Yn+YG2UBwZ

02tq2DIqkJjDMoe0o6vSKFz/axfRKPtYczEpL5277nUqqf1jyrOY0pGWiEdNOx8ffxNdvbM3Fs8KCcYO

D3Or+1ruVmv6aBA4H06bxFuAmSWpq5S/h3bFOTW9xZ
mchuEJ30rA2OVlwG5OqG0ulwdDg6GK2DVALsCr+aLLcf6pUy9J1D/KFgLu2lmkhO/6bmd1VNcoPy/Delaney

WLCxJHkA2of9wE8ajQ87x2IQ2zZguj0UoLQpoQG7zQdm19u/Gjvgq1S0bHY+KhB1wL5EIj0TyUe+3VkH

unDi4ZdYjJHGDl2+mQ9fB5pqpJfAbwvOSlLMSEu7f0
9F8TiZSAUMc32HyoQcSe8SRopm4CAoAHSLzm7ugQH/1KpkJFVOuq09dJGSzchM377jmmOgThEW5WC1Ff

di+4GpYN+T6SsN28p5rRPpo7P8lxmYpsMilpkilpprcVfYZeG+sr81tAuCfTylTOo14WKlEHRasACmEM

FlG2gAIr9QSZskfn9FZVKH6UF/aglgsfOLqvlCOtp7
j+LXMAZRUuXzK451nB1ireyFzLUclT0xWDQ5GfEhwSB0Po+P8XpX7kvGRESow6B59MxSmZ3/FCqB9wB6

QRG4BGlpFnzBjmzTHSYFW+X9blZwTXo+x+0EzW50uv3f3MmpmzDxYQck5LRoI534T8a6cZtdWY9n0MFa

utlmJ3OwVg06IYNat4zjmWnKMWwUCGJUxUQAKoqFYR
yRQ16qVyOx5CryK6cpi3Ym5UcaUxY5GE5j/aoBNx5nEulxiWH8jy5PRGglwmbNRvisWxL33k6uelx0tn

LOtOF/Q9sGrl8NOUYRwAC9OV9JvH9ZNVICcytyR2txJ+vX2H2vhyCZy4R3EpoAMr8IsPuZylqdVvJTnN

h3rhWX0SLN0RCQF9pQC6Z3tjZ5n7xacFPX8I/MPB6N
bclYurPlfPhQ0GdBpfhgmwyr1ATSHjiYwo6j2QNaupk27duFBqCa2HztdundV2rh6zXhOi+OlG8kffcL

MliNP54ZPIq1Vz/pdywYEp7U/RFNvviQo1O7BodcPSboUpXW2LNF8+hNz3u5Fn1yr8RASPk793mgeQJU

dhnoVW9dXockcLY1aIKi7Y/5TCMuLzF9rejBciHcdp
AbDPieH+DwxouabDpfK3JomKjCfCFDH7Xh4nVmsK7j8meQsVVSZ7x/lTFBTYsz0VmKgUwCIEZnAAhlSN

F1C+U5Jo8oG6ylSknLFT0EqrC3i63W7zHg/qVmIerEbG7mhN6D868fezpie05tDBGD40Qayw0zrRQ+Z2

6BIKQ3ocv35RSFNHLuJb4+hqMt2JaqZKEf/BzqI7RX
rU3hcPRmD6oIz1Bn265fw6mXuD/1x18pzkM0naBceWoQIto4i5/jv00t9GoUPoCksY+jaLJl/rEbBq/9

vJF9yATUyb9TQpf7WmuXtL911YItjxpXKLEruQaLhYO68XQmka+K+a81jDj2P9r8o2UFnIhGsg2IYo3r

3LLFsrDzq3IdbgNAq0nigvskxaydEXTKq+P9IKXUa0
kcB0hBnjh9aCSq3wbecPwoEWU1i3VWaHPaeGRuvZZS0NBvH0kWg1/suGWkHUqkyDBZecGOH5eeefAKqc

BAtoEuHbq1atXGWOXIwOANukOfqe+74l/8dmr7Al2XCwUyNELwGNtEhkVtyRcTkvEyAGL1EfAcJdic7x

H2irERAq3kzcyf0/IvsYkxqq+5pEr/jGzRWf/tzPaC
/0F73G6m2wz8KCRZHQSA6o8iN1+jam10ZbEhRHHPYVEnkMmaeaJ5gqOAPfln+yNO2u7PbPtbe5t

yXJxd1xvTKrCZ9LMYVbSObXSZ/jGfSkJHoQBltkgtHkP5meS575ZHkHftFFFdRbOQ9Azr4VF1b/zz7Nl

b3IXgjSXVpAlH2YEinXc8kJ5tJ072wKRjm3e2qtugM
ISvOnMLK11HgwuJdJas2XfRdmYQ7auLGb0cMa1WueWJ9Mt/XtYt6Ueg0wglhtMiqk2vXXk3Y59k6+woT

HxndWugWk/A5s56WPk1pKhO7RowMvtv8sqHEfLSAda1w+eOHiBFuH5b0TvwQul4xTE6F04l5Cpq1xCfm

e0PCmr8NWaPHwTMkaxJqs8I5DzD/+KlySjNnbVexm7
H9OVeMzoWrG0WsjyxCCm6cdY9OxDpg5J0B+QbfvPkTG+XuWl4wISlTJJy5y47En5XmyUUQCxPAdSMpLn

Fua3bdjKHFjwLvXht01wqqxeoL124YnScCZOydntOU1whIjtE8ye1FDZafBXTJSCjx425cEDV5md5Y8Q

Zw2/JCZuVHVQwTTb+bS21SyIwaHO/cdRWo5Y142Hck
sLbClCzRMPTQBB8TxQNukmbXtLmBXevPwmB/Bg+MlZ91art2xY2syrTskwpcbWmdRXYslw2vw3k8sLYK

o1/jWDebwH+CGqOx/7ZHThUPnpRSd5YYDJQcHgGJeUaENiZ2MT49CpEnWw3EFNr8kzG4dvAL1HIe6Qfz

D1Maxbzf+qZd/evUDhe7OHKp29M8NP1KQSdbeAVP8A
DUJQfHhGUPh7ia8w3gzxEXKFdfsfwKmTItuo3OfJ4Wb+j7cj654wTNn6FgmfeuyS5eFkm+wKYF1UbrvO

zCo7IEqwLXUTrlZ3qRypLclzbetmq5iXA1QEa+Wg2F2E+2su79AnqhQ8iRMpCbNW1tpQ+DCGjGLLjAiX

hxZCMVZ/Pe08P5grlGA1Cz1+jlRj+o8snQWwKB+cKp
UZqzxpWuRo8az0fGjPKDj8tq3jLJl+9KvhMDkNvgntdbvZgda3I1QwO0If+qqCTSoKR0vyDEpxj8p/0u

Gkoq4dxXxNmcEqRj9n6LMhUosflCioCKrA+ee4BVZtf4K7I2b2jkTHAraMitT2iljqAWNi2LVJz2617c

kPTOB8CFC0ocNLgxxijaHL/XSE2ETkZ8aFXX3KmDoU
rgi89Nuwf9yX+VbRh1RIfFH+9PqE/WCXoK0EXolxIHkXURn94Qc6O6hJNdSwzyW4hT+Q4GHtxk0mqiDf

2RfVXmkKt7emHPkm2G4AsLXiK2bcIqunNr52McmU4T9669GP3f86fikUcMIrqs8nKss9Szrg4ET/ta8w

bVnY/zQA6aHHr40QzQuf56vpIYBsLlGVP30kabMoVG
mLv9Ux5lvjeieJ6dnmgv7YH8d1wcbVQe840kQjD0gjn7cgETSMgbY8Nd0n99aCeMOVfe6ZQCEI8bMSKt

3UlpGr0uS580wle7VeHPipjZ2tmVAgrzLzr8xjBu0xy9u1xa127WJvHYi4F1av5xw1qsnVKYYy/7SKzw

T5rTAEijA4DVV82dgN6p8ygHT9t+nQwA/R9U0IT7TM
yRmS9NR4Jfjz/3+x3fb/t5r6snUfDmi2CG1UlGYcjSci2KowXkFGkmsYcoys/WC/oojJm/9hu7P/H223

BgLYl7rUeWa18iXGUHsNfSXekieQXzzuiGt1/atmX15mW4HkbNrVXl1VCRhiNtpm6n/V92dtg/14Z6+S

NtTLJ2z4/88X9FdSzMXZU9uhT280ivjxKiTvtuFDQz
Sda9a71Xio+hnUanhlgHkE+K4ec1UUlkR7N8PlReYCS58d2vgRQ1FZwZjfzN0tnI99abaf1/tQhtdvll

26K/MVsrIllgIcEfbpbZWvDMDSVrx223cESBm/l/q5uEn7Q9gJOVQBJtf9Zsv+KYMudrDq5OtZZJr5se

O+/Bf19uFPsnSGFhtveh7fEhAN9NclheMgm2h9N7D+
cV7y3ADMesAde3enSfn9oYY0+qDCq5wBNXwjVxa81hMFETLNjPUdQyS0RQtkf8+kgIlv5atFNALvXR9X

kP6KS3kJjEpudXQmw9MOkKzGGfwW8LlTvq52Uf9h22HoSVDxJfr5sN1XTJzn7OIu/V0El9uApxVB/X0Q

ESx/E2ElcyEGCay0lBWEqeGqvf2GClNBT3ocpNyUJe
JHZJlw0UmfSO8fJFc6M1GChnBwkqHH1Ng/hcFj6B59BSFIz+BaTIGRQe/fz0nv6TMbxO5+OKbCyBqbus

Chi/6yJnZ3+tq6FUUMBwtRN17dCBaC55nxY7PE2N8DFMmLeNEub39bNt21kmW2akZ71yxbVQJTAK7k+8e

OUcZuUz+ka2zw5pCoNiPlqkQRcLmrPlQwB1fXwgENa
xASE3IBMWQg3yUCMt74pU3SQ699fA3IX6Y7OZjsZsxfio/H8QllPOwzzcweXVBeNeWV3b/ADIDPWcoEn

y3wD6yh+nWeWSM0aCdyTDZb60p2RyCGQzQexS8TMMXPvq7PceNZ9Bs/GluHl/Bg3babRCYJXFwwDjcPF

BROG48AOoS5qYoEx25C8taRqGMyDxjAvh+g05hHxQ8
XWuj/nHC4utTjZG9YdtYCXh5UTbzUuX7NcQR4ZVZuq1/D80cSsYf0AoLnMVx1WrdCNZABEm2ykTtk5Xm

1pqzE9dnh5ISmClROANZXRcUAIODT93uCw4uLBb1YPlad8cdGKHs9Eiij6Sjyg6pmpjMMjlPMrKCUbLV

JSXCE88KI/Z60S3qqpC1Ob2E50eCZ2Pozlf1tsT1Nd
9Ph6KkNuCjjo6kkeVL91NQ2I/+BJBmL2FeZi4tbDim0GzR3O8XvmqQzVwJwFOQ8NlxI4jG3aP1RJ1m3V

tyVZO7LLZT8a7D6Qx3wOMMKz+ra5ZGXkw1XnvZDTxJ34k67pCzVDkOTQg/xmj0Rw8wENIRb1ck270NPt

UTRMLw/4ENxld7H91fntWrIEH5CgvKOXPLNKkU4M3i
qc9uLamOBZVJaWLkLqdUA2zQqkVoFxnbXp40q9PVWgZv2EhoNaUx6RpOGniCQq43N1L2Z6KsmYX+lrcW

9I87CWpenDUtPHnZlEVMPCFSYyvW1rk/ZfnFadYIzpnwpDDdICtYa1PDPWMAwgwK5kfU0jcohicDQnJF

6bUdZte5T6zm9LaCdHzPFiRjWgabF/F0R2h0sEIFik
WN/BTxlBKbycC/WOsaf1ib5nyCAP1uH8mm7B9NWo3nmsZ+Opr67+9QClNe3IuEtgZVsUpFKkzwkTFuuH

62dqxlK6aSXGsyd4o+5Ydy/J8TvW+Sx2qd/kDOj0UgdAehWvgbtrf43yb0q6/AUYPqfG8rQ0ovtP/05F

2Y02IQ5t+wLD6Xgg30PI3YXBRCkMke7naN8nWyHTDQ
6kdsWcA3Oh5ZIbRfd5KlPKlZkKgROrVDBm4gpNmrJ8xHSY6abLfm7AdT2dswxwe8BM8F09mEOWRiYA8s

pEuPf979osmJdNrQ4LxHusKibdUtr+1bv3jgQ8jcY81ys6U+4hDpgkHjB5aYqvuXI5TWu50Pm8dAxYQO

h+S9Ls6wePX9RAVMcwxHIClLtM95xId4TVoKrT3GlG
/3L7Eb3s4LfexpPvM8qPLCeUdFcrpq56KrT0k8T5VAG3+HqciB/Ny+gMX42aPT9me7SPEwwq2jqS8dh1

/J43JG432KBqyC6yibCuqhIbi+XAEyh5nc2/tQr5z8qZGxrzBMUh7JaxOZhJTBw0qpuYjcMNnTeecayz

16ywqn3SqWr0tM6Q1hJNZFUvUYlWGIwLnnaOA0tu8k
0Wa1sDda6BuFQKt+gwlB82hWkDlHUxk3VKe3QnsBOviO/lB0j1sNEUQ5knBMLIueqSPF4XcrBAXQlC/a

bkZ6LhoEn2mKClpqoJ3tUda2ci61NYhYuT+fVW0EY/Zi3GLO5PMs2pIS6I09xeFAxWT7icLt9J07BLyF

fImzkIziYxSZT2mVLw8XgQpNiIm8jUi9KYbY+7+r3x
cTVSe/yWc1CQY2IbYCpawb/6xZ1zf0DeVS5yeyl2MnKRfjbO3thYQ+gJug+Ojq6l20EpFgmbV2xUnHR2

Ko7X7m24B8oLh1eabwKoJ8OuVqJam+9CLWdiXvctqSTzkAyrWX94/UmN1Pq5qwccsFLU9mQTXcjHqPio

vZs+eaaPWMj+kLO6UQZkE8m9y8yvWq1Cceca2iOMCi
KgCQx8/6oPeu4F/Ye6+/y7pbUJxJu/5LntpovMMAeRYv+Vpg/s94nh2l7sAcD5k4NrqgC4zUp0lxgHc/

40VQVlwBvZNeE7JDUtRg7CpXgwTXxMdQk9mm1TGJkbmHlQTRwP/2F2aan54A33vXQ+PawYxeLHOb+cSe

sJtFe76uH60qBFDO/KYOb81fDr3o/iOxH1LMfnNCkn
Pehs6xbDIPHLkQi0kebdEcD0B/IH0Pp7SiK3LvjISvrPiIxIXDiagAsBilJVXcXtNKV67Owp+HErHjxt

/msXi6lfLDd8Yw7I7ovSYKE8xpTPM9nUeYFGJ7pfognTZGmuCeDF5taWpi6hzSGF+d6Jch6ywxOehfNG

lBV4Z7NG+iOMWkCNX5+jgG6MOOaCtn2WAlHGB8ll4u
SRuUlOomPG9xHvSoLhPCFmLYfgM4xqth3w5oa2A6tZVSAyrDvNaLA1pVa6i5a4kTPO0ACbPAqoAXSYaP

Tg1L48/tuKkgFPW1Z9IttQeDULQ0gtBAGFtWISpjDzx/PGdAa+A4qRN2WR2gzLxj9gTFYU+aOTi5nWF1

YUc5n29QbedstlKei8mnyyKyR4DDpjM6IDqhYajKmt
f8WjiLkwfiav+uhaVpsSPGfiEdm9v7rxeDJ/kg1ITR7/fF+h/SOW6AAWmGvX6A9A7Sp/ajw7pY6e1new

mCfn95qGSG31Euy8ro70ABFdz3GW9hj7YVgPth4hWjZl9SYyc23L5209l2q1aPY7iH2VllbpnFmQ2ylm

4nwpuEfr0N6ATvz2O9uayS8jlfKlqfpk1BibGrWzKm
OtSCHuEC4FIRu12JfJrhp/XlvuVQb6EtEO3oSAK41y5jO21604WaRvi7SNJ7KGIdVnJuKSmntsfcQ9Xy

zXgKYCYKgm07Giwgy64CAtLe0PHpyt++jh+T6pbxrifWWbD0idwtKcn55IenRYR0hmbk6OIukw51Z+JS

YWuZqYpgSKEcTJAEP5EZRj90Fu+usZoiqZG/vfWPvP
oKa+L/sordD1Yz1CKlP1mGuc0zOZqUgDMdNpkKkUgoYMGXi1E4yLNSKuPnPtaIETXU6TQHEL5+9v7rnn

/oqzkpg6zixqa3g8+8xe17o+Z8/aCwHFGCdKCPm8+q0BVJcx8U4mV1drfaQl3+EIqXXjboNXGRLkQN6G

GKxGPAm3wr0rCgVr5G84bjo4M65db716S9U5jHrotT
5h2KKD8hsPliG6pp/vn9YeAWi22Pn97OEEzydZf2NbrlvdrCr2L004cFEFHq25d6SPPnaNIhuTP1xzzR

b9rUYz9g1ngG6rxqlLP6s585+F+ZH8LR8VC+LgHdlnQyq9oib6zk/+0YSOwvbPaDgXcAjGQzfR86giY5

oo8cb6fNeEGcJiCJ1/HbTlagmiaPHbVLGvqI+r2FM/
VEdENYHmxBu8ENmsskLsikv8qjJBq9hPr1SRHhIny0LdwNGUe6qpJ53GfoitdjmXBgsf0hLL3JUvTZvZ

8Jr7W1bkfUzzgXWpi6j/ZfkKOUmzVQ1L2eQqfLKWyg4KjyQTHqAQ9A5Ow/kG+GjCMzUK1woHfC9d8D3y

25dI0pbV+vfaxSpbvWlzmPF04K5HY/fdtRRivMMYo/
fKpEkOV17RRoniG1W97ek6H4z9EfgsV+XShpxBS/0HG/2NCs0U1WlokN8tot73Il/iIn2aBmHVlRWh0D

/s4qmqejr4HgNxkvGMmjzOtotlyhhMwlOe+oEhagpQ2UOUFStxXPYeKVqrouDkSpGHAJnFPG79nN69tH

ouV7v9hOwUcWAFN6xSVQABE2uY3t9wc/lX1ctbJMsT
wk4zOLA31Goux5VvXyJq5KFoDNrmnAmCtzoO+KPjPdLvpm4QLSJutxoBPTgWOoWpfTku7y7y+Jh81lWY

K8/AJ3pTiN9lIMIgdiMFONab8vBaXI6fEVm8wgnwR0fzDvwwohRYm00Kez70xWAKoCEanX/MinSNk39c

qIefUOijMWxBgq8P090X/twdCQ+WN3gty/urLe9Uzo
5ThcRdAGiT2gUt+oREM1aFxJBbLX89OVINCdAL9rZJjtdCVNaOa/lTme2GPBNKLMi/rbkN1aiI4vchAZ

zi118CF7WbFNJ+TJspUn7+/svIAxoCpht0jnf/7Z3lmg/6gN8oThbsaoNJdaf+21wZ/2PDyq0t0gorzB

nO8UyZs1e5q5WAUkrEM3PkQZlndqPIeivMkm++mVp2
lyMi3VyVKm4rwpjhzZRw6XyNQht1BatS1ZtE932kI/gKUJmXD8EYl4mqcGsnoLka5/M9esHf8KFuxxAG

StvxNZy23pZvvT9nSUCCtkiLwA0+Wxjg8nDMWJkMDKc3fMiJ9Et9g13bp+2ppP0QihwBOQ3h5BYznwYA

Q19m0YfscJQItZZZfJjAJndbiwPRV1fZjMhBMKWjVq
Gju/KbehXBf9noZ1dEB5MGPsnER3gjl5uN6w7ZH9aO4n2d1DOrxDCZYKN7B/mew3/lUxTxlVoaKTGHcP

e9nrYP/QdjG2eOh0M3wpj4SBOCHfGV+vCAumJ9s8JJxQKdjl0p6KECz6tIssL88KDfCiF7/PThX8HtcZ

opCc1DZppJEOPZkK/JxtBZWtEd1v20fy3U8dopR18g
BQlVqhZst6JbGYpA4D/LHuinvHvTAk3RLbsht5AyzoBajGCx+jWsaEv6+g6QM+zT5ZEXgzN9Du0vYPAi

MtgyC2uo2VxjKt8fjJ2XD+Fcl+Er2JkRmk1W80F5+Wc27v8mzyco8luIteU2SITsuVUgg9EwDCTmCESE

0Cbhi2ukjV3nP21SFjkzd1S4DHOtnv6jgrpk5Vzv6u
L169qJhUnwYInKhpmB9eH4roAWx2rnMW9ygJp28qMy1TyEook+3tyQt/iViniT5rTIVFrmwT2S++T7zz

4t/T/2CV0+yq74ain+5T/u6G7Rjr0CDlltY7r7cnIhrAPGWsiwJ3koavODFbDTuRCcgQ09HRqkS/afPE

UipGib3amtk35ZiBUA4bkcXEKR5C0520OHmhKbsQh4
jriHwb2DNQGattttswE8uffdaZNATMxYWq6i9c1ax6vAyUvGJ8y+l4iaYIIvV+CsJ7GgprTz8Ab9TFJW

qV6YTP4flA39FbHvmi7imT6EpfWYFwkk0+R8Na17e26r1NJq/b/g2tEf1PNOaKYSYTMfk+vuSDuxDLjJ

HwIselwqAYgtF9ADikmr/awbg1Jbt5jfVAxtrhcWJt
FzbXHIxQBXUM0eIBJDwwTFC3SMRDgnib0qXcdkmwzjlykx7e8sA35Qg9uQz+CM2b/8ktFtZWt6vNWZfO

4/a2HVZK7uB2KoROLLUokxV6poY4//yUYuWynFj+rzq1GElEn09egc1lz/9SzGLTuZF/oLHnv4Kn7PeO

cxrI00h0aHmdZ1Pbenub3G9T+1gW+DNeQKNTIeYB8s
sa6D1+ukAXdzFgqJjThoc13wXLXbAP2gF8qQ8pzhOi33mRMd32MdVYJVcgD0LX1Wi70I08/NnUGiMiuh

TNrITPS0QI1J7QKKINx6VmkJuZbLfAWWzX6HEL9xV/tdppCq8YP1ALabQFfpkmc6fhdzhg5+JKpRL4tq

PJ+AZKXna4rL95Uhy6DfQlSE1U1CCqjfij2wGLVdx6
S7xE5lS/mdkJ6L7nEJmqs8hyKbVucHhCI9gFYXDS1NrbPJoAoHY3cCTjOqk8n5svTG7vhWvGHXo8Pi+T

rqLTIOIK2kOlvowxhd9Mitpgr308YW0yfMhMZxUDUsEEhq0kzy7xPhEc8Xw7c1TM+Rm05KsyNZ5S553v

dxxKywwGS2q+EJ639MBoKXqhlS7NQlQRE4gSiaNudN
NXY/4QLd23zfj8zA63lk2qLa05stacM4LSZ0zX9nV3PrCawS6lqOw6wyNL6MwuzveGBG7QMC9WyFxKR7

vczZz6TwzPkNyD75euXtQSRnwupeso1DgLRzRP2KbBvMOG48OwSEDRoxogV/9s6BuWKRHIdT7ikEMQ5e

RPj3rCXEKE7L85gdZ2z3Yip4DElySsm0u6UXLKLOyD
XpSuBwd6fCjHvxwjJRzIXMHe84twloK3ZzXi5PwHWgm+e/K4RvUP4ctwhJ7UaOWxL6Bb5D/ULf2737fi

iHV2iCbdjBw6EOzhFuSW3eB2DjVqAby+NBvOgcnK96recFlyzANPSt05eEKf7fj3hy4tFusb/0mlbYJ9

0XaAGHQUT9Sof93Vg6eG3CXDmWXsLXkq67WSEdFFLv
pkuQIdsShs5kPyqRDm87iQtxWVkW7Ub81iGgaS0XaGQIPRagfXzmfSEFTY2ULGQpbB9gKt5+oCQLvDjq

jvUnRzigktywWSWWCCGecFoBM0FUbwtov0IRYlael4D7pgfHR9misOG64TJQ8iW2iCxfSGMrOaFftAdV

SwtmVHup97dPnKo0xzpRwC8VXTCySLO/LsIDZvEnmd
iWu/q2hWChGDF26Dr55NTDNSo8vPtuzcUh9W1zuc5oBjYZa+2NW1aHz1PXqdzfMO1c0Yf4qcKGRyIg0a

Zuk7bi+pljD4y0P4fttTL0EJr0ZTNgvXWcpQ727M3SGXw0VALEty1ld66lpRzjC9/Wvb0qPLXz5CX4+w

gqVMoDq0Yr9fCt8GmGKxaATi76xneKl7fA2Y7C5rO+
3Zu6JozQSacFh/asBI9UkIZVTbG1PDu1wB159LoQTn/yJ7xG/o2OyxfD7Z5SVjAHeiJGvqzmFtMlrMS5

Ca0H41Oyr0QM/A8+G897zPSMxekkzUws90FtmiGcnSrBsQYU3tq9nqKCa3A9YMaDTU+8+xObNtwgK//5

bQ5UZN7d/vHaGyv9dF4n1DNgfr7j/CJVYAIPDCZkMo
gsPL+38j/p4vz/eI7fbNVJkh/l0CGsqAgHHnYtnttlzeIXq/ZtERubh/UEeo6eW0+RG65XSH38luCuER

MOcziOwGCfYMmZAvVNu++kZwjUZR4Ut8jbkcP7lXi9z24o0XZgL4L13jlZsnFUyeTknM2yHSC1J/fMpJ

VuQfKHiQaBzvsreq0gN/S1LNai2zrLwImmTg2A5iXC
V/yVRMfsWHaaL6pi8d6eZ++/q28BnxXI1vkcUkc7M7PgakJs+KGxrB18iYuXr0oi/Qm/sXdaqqenBntL

3hBav9KB1ItLwj36py3FDEqdg/oYuIw/vpHIMM4yzSgVCZhWhufbyxYhi6PORpTkexYvChns1pl70U6u

FUsmvb8s/BIYqW9ehZ+4aovqt8kUu1EhLCv138zZ8q
uVIiIrNTg480LQN6zLmjxgEeU2ea2m/C6KJDHTQ46nzQikogWZkxuQX+zlpl4X+sB9EZEuGSfFoIQbhB

EBraJHA0GdPx2uQU7JIpawpjQqy8WnsCdcwhZgrtl8R50zgVO5N9qC+UpIbFzdp0VHAADBBGn8nxSZ9M

mUHuO5s4KP7TlASy95mcciHVQ1wx6ZO75dByUgZymQ
Iahk/1q33N+3YaUZGSfCPe5Cjp4ypsKkEd2WTcAcR47j2Du1Qz2G+1PrsDyU1qsp5zaD7XxI8a7ULH74

efnq9+YHaoQk7WZR/DVat1qMMAFJ9lF2ak9Mq/BBuCt+z2yPvvPJIfZUc9vM/i+lhGFBQ3x/AlRyuRXd

m8dSKMrH8/gBDONDgK3cM/NyOT8yB4uQP3CBBuDMXt
d+qsIoslKA5WCN88pNEayCijpyls8ug2GpGax1pZ9hESmEGGlNGzwAepUUKIOT9E5+W2ld66ilnuhg34

8k3mV+8K7aZ3k3OK64JlO11+d2wBttvphU9xH9GOHrjQuhCv/VuQ2s1eXAlzxNUC0ehvqzkl9ZxgmLk8

1MamQneXUiWp/nCOSUMb4jtNuuHbudKG2MZ7P5yvAv
16X/R6t3ggiUy2mljUS6WnYCzsEBxK17I+iZG0rDXm/9qS6rJaFfLgRQ1lrcReeXGhpvu4/ejEU33DWQ

zW/e23mdmMX36Dyt564ifFDVrEZgk0e7Y4l82oleXNYIkI9DtLvjz2Z+GnqZ8PpJ4Ch/zPIrKDy5CVyj

AqXCBVenhU1F0nJjs4gA7Eb2b5IiQGrxSVkBt45xP0
AFjFTQg4lxgOrhj2XWP/RRwjT1VasiAsRHvEpLFPWO3E3mmkb94KqgiJDmiz+5nPv+PcG4RJzmZNvFgn

j4sQ1m9u66KU5Z43PCWY19MmC95Pm07PfoY+fMHizkmX/cM6/bs/asxWELHxGxjwYTjGlyh1BZuqtjb6

v3Cx/mkhO+7YHCAlGnPA/GCDP29996mdKFW0LXJ8NS
JoDuaYReyg4ODoE2mvZAS9QMPzOOOXCkhARxP9b2Nl8FJRdZmkwNuyTY1PZ8BVYCRWYmFaftL/lajOvB

EhjDLOgS5dKDE4N5Ml3ZXkxS+p54EXOE0Sd15bwCL0Rh68meIAVHIIlGR5G4iM9oJnwxekrrDNb+z5tz

6fqFmcH+Mv8SyUCVjmTQXmyx4NDvGx+v3C9y2KRYec
xT0/OuLyQIyjcDtflS6IlgXoEd6ccEMUsq5n4ezFcH8p+lAsPfA00MxWM1XT55SIrbportOJkfIh/kPh

EuWVBfg+iK88EFA47ZfYeltoyokgBwQa67UY6sYdL+aSujP27vx8gis6ip4CJnwv5z8X3OQ+W+UVB9Ac

RCO03R8Ve3kHwMfnr6M+nDitKfyzFlfFaq6iSOaTJs
bbPdS9oL18WHItP6WjrMeJEJUED/oODOy79aJfEAGQpc8X04Ykb/Bi0PXs5qg5/TTCz+KnX4e8+qZ7F4

jY1Od1oUQ/ZkTlvrdHyl5TAIlQeI+24ocGYZZPn0g5u/38poHVxI7Z4geuL8PazNC+VS457r/S8RPFzI

+wRyptjU5Yqd/o1ZUmAyri0u7soKsEe9TAqvBxWbxo
1L/4tzxU2fKpcd0P6ira9YhqKMX5QhY3T8NB10NujnGKa8N+RN1Re1MQBfVwsdYFe4LEh6nrB2/ZgjIG

8FCC8ZowPOFUcFnJhtnCxUFrjdc7E/XT3Enq9q6XX4481leT6qa4iO+JvEG6/DmDV9JYH6D7ToLBlzQ+

XM9RNpahdBz8Qq1H93Y4VLGAYpSi+7l0McnsRAKBr2
lmt86WxfoQqabpVJm8FECfjbiRGshAYZ4PpCeXlMqTya/EgeQbUbfDCXeLSsnJPHjHNE9tBLvJKpvHeO

AQqZaDQpMtfyhTHa328ZO6GjM4zxWoe4qjE9OITu5HOux32SA/BKCy4JBSV3XY+2yeW9tyWdpYum1ZsQ

w/vjNee0H4sYWjVT3yxcyOF/8yLKNaaFCmO93TIKj5
mTfF5k234tEdxEdL/HTSK19L11Ics6/zbKZoBYYeKLPdZjEEeluEksV2ba8dlPjArrspJbg97WqOrvAx

umJmVHFTtoczg2lxdTJzK9LQXm6lKny3Zb6vLkRwJR0X51im4WPo1RXbixrsAGAVZhlYHNTixAKq/l5V

B5F/+xVBLp9LOM/gu8fgSda5XFNR1lnoYQp98VCtt1
cmOQ9GkynjL2FdVZTMx1ipUZT2ef37sWODak+gANEbYnkvVlcaKiLXkQiT0LO5hwILX0C9SYF7vWRTcT

BdXyyCb26vh00Dp4/QtFrUSbttybYGOmEU4OIa6HsvXCR58/tDanWws+JQjQV05VMyl3R7O7ajrEdtCX

uc/nuQlWqSYIXLDBDIMoLIpOEujbW2fUanEp16kv+G
VX1nbcX3DSahJmxAW09QILq6c4PL+gcP5csN3qLQcSqz+mox8JW9yz+qjbZtJNt/EPPLql+lhmWHHF4j

8/1UVm/3KNnynOtjRCn+0dGJhqUxmOLDLzM2m0ktC+x359mJjnAvtTlQ34jXc1mt5YlD49lg4bLgmVWF

GQAOCIqChvptI+CyU25zkSyLvDkt9Ly70TVeNZIqnL
b+Z7ma0EozmPFt8aMxFOCx+GXUvgkkfiyrGrlm+JvPpkUhNnuqaBHX79lYTzMdiMUieJz8RH5toxhHQO

xJ5+uCQarRq3ueGeuV8efoIg9x4n4HmgmfUXa2tQ+wD71vxPKZanXAjJJPJGO7IuDCBxsfoJL4ktcRQw

N5/9ycUGmvpnB56W4T8kehRtgfhf6qpbZQtxqruaVn
kRth2Xj41NDgmRTZ5bZluUTJe21KEkpv4yGb2hxf8NHYCy3lpln9dPdlYoIWK9o+pNouG6cTURYORj+7

Ihq37l/AoMrvGMhHJZzqnC7jH+CMjq+ugmWiW+OV2gAZuX6hxQefnkwr0FL7yYsHtltRO3K8on/RimMQ

hyVQKSMc9VleCmok4qW7HMLwukhgvEI5emnl8vk9Ol
lU7zvv/haZ8iDc7S2enew60yVjnJoU5K3zvlDV6crZ5jxN39h7je4zZkaNOAeb0vm8fUNjQTzbOAjYPw

HDwvK3WpotRM4ltoQkB9A9yko+7eCQupy3ZWE+3nkaCGR8sZJv8vWBR/2Y/lTWzW8Jko1Wv814x7TfDR

0ZU2vJ9o4ndh5Z82Ed49Ljkb0wRMuoxs2ExMHBD7VA
XW/dU8O9Bgj9VyLcfd2Ynmk4TUUzYmnnrQol33n7pC1+uq165knhGMRluzrNrTyQw0sryCTbyar5MEsg

evaPsc8CqpEspMEiw57w/jQMWYMhN3dxh4d4v6gmi3MI/+XhJ808Lj6OP0Si25Jg/V+Q8/ms2XxF2TVZ

brRbKT+15VILzv9SBymU0NRK7uzcsmYgsKkxFmlvMk
k4X6zXAPK1HQZGLLLtEiVTZVpurrMwFin4cU06hmjZgrKkKOEJlcspr9RrQlnB8LMtvVdQgij73E4PBz

lxlJnBuyuW5IJzZBMqvxBTXhmtS8Gi3PLygbJZrg3lzsI7vcCmm/ud1/JnJb4eox0Ri9+wI4rMIUNHHo

z8E4jdRM5wKiJqi1yd7h5B2X09o9b45J3v7CmXFX6X
pqNSKnkPCDzASuO7wNvHP1eXxwTLjxz6H/LmSa/4Pq2pBa6BqqMjqr0wRpUOZO1tOT/RbO0aprBnPNh+

hVP3zZjxSGOKXg4uaQkPOFb8haEfbmSsDwPt84u3SzcMR/ytR6WCHHak24z2ZtNJZYitrnv3Iuy4xdtM

V1ljqRjDBSjyw430ZXsp4Ju+JyH7se5DLps2Oq2gUg
KSJ449kF45XteHgTFoTZINYWpbY7fKeU/z5gSaa8quoPf8ASwqVXDj/9x4XVw8uI5+cLBC9/WVSZosCG

tR78x1dy5rmxwX7q5q2acWvM15/vQxqX1uzPfepri6C183Ab8714FQfVd+offRvaz0D9j7GpGkZ3kDBa

qEMo6jhsIfDIlnnQlfiwFXjq0bCDFuUPV7jricK4qZ
fWu5NAVVQygAbZPHJ5lhpHzam246vzDsmlvNLEp690RmQkcQPXmVwabGHWDnUOK2dI7choOJ6iwAiYxl

ac4JqibFjPKNbDNDhnari/+9jytSrer+abDiNMd1ojZur63E32O6WCKOG2AablcJSEH6hN7NM8ihuVL0

HITnkx9+GfCjlNlmjfxsby1mhkjJk5YSpEdULwBlpv
npAo9nqR1GiAl+ZnimHCPBciwyyJCY1rk8zKhKbI0m06WyG1+FhZ3FVkMMufbyM4BAZPEGpPdidpKdRq

Mp3ZNH25cr82diFVcj4UEs47sWUu+hg1FOuRVyK32Ij3gzzlA6I2tO5l/kWErZrEF8r13fGUT1cTmink

wXKHjTaz8WbhkCGMNm5Lam50nyB/pPTopf68l/TnxL
681fsBSAwAoo8WEN4QYN51UZ60hwOMqO8swpimzM4KLf+3quOjh2YGo4CZad6rb3QffAKpoUhecRZ5LZ

06oe0tTrxY7QP1civHCZZ1KuWIRJPAt5O0ar3sRmwinqWTVSDgOFSTbFKcScRTjPefwlOk4UbWK2I61e

Ln+rwbxxNxbpAfVWdiyRlsIju5snOV7auKx6zW6X+u
6+aMfhN/OqSrKAjBELFD6z1+FtGd3MVEYIk7ZqClGUdzCIZRsqNXoNOYaT8Uhe5AhwQEL2vcrbMj2Qc8

SaOHArj38BmvC0DFBzP2LHFK/gAZMDtJYhOT0vc3JvM/vVH+f/0QyVNWwWFrgDb4amIG+V+0/L/vMf6/

GaUfuCL0kjas9GHhjSq5RVVyBzFoIQH2W0myIn/NR7
avOGh1HtZRiP3WqCqg3VxEV3KgfEoDbOXZGBr4HgGDtpW/amKQRrzjqhjoA9ZejLd3RiQSfsc/5cJ7J9

hDXbaZtYNPR4pqnDXtjMCv72c/xLeVMihK/I3Sjkoze8+HxiyXkwt253Bb3XFb4SdeyjTJa1/j9mRGN1

BlK4rgb1Xsrvbr1aPaYF5pePRh4OcTZmDsPb7OQlGj
2UTdFrarbMWBx5XXaIlM2W6+UlPz0OMHhrb2qvuX9u7xoBNMzSOpsBydqE6vnVQXj1bebd0syiZ0Blef

Flpph2sIw6e6cla3tNGGaJMK3t/fyUAewnoS6bknNXovPh4PzP5kPU/mHf/aFld4g6tqD+aG/htKfTjv

/FH9hSX/DIebf0eCEW8A9lIYZhx+UIg/jZMOFMIJBQ
DIl4vuD1/4OGuQn1enqerhPIarx7T8UwJNQhu9qTvOODdfhrVAp0xWvtWBOlYX5f6EHzXaEG9F7xT5vg

zZK5rOlTakC6lvw67WX9/qkRHyB+s/CyDLnoN9iWVaho61rSaNE0U04AnKyAzG6IVeZeHpOlOUrIYUo2

ksDwpRk7tMHfS4FDWUP2RtkKAZH2ZjXnlRqBob8B5x
Go95Rrg6MeagckzuIyA4m/9eIa0eNFurC+JtPgvbmc1/CP8U+kAo2Q/BRJ5hgZzINYAzgbsAnPxBOfLz

POYyhmx17IrmXkCxle5o7N59LREG53Kuv5Cb5lSJLXVKyrqlqgHX5ulpeGZnT8vb6UDGTnLaZy32/gzM

4H0mNfJStS3FP1l+MdQRrxLDBkgfaPN2VlDxE7+axD
cDc9jKblVj7lv1vb/itDZOSLUq+gwHPHF9W9S5FzP36rzeWjH+OA5PEATUzjg7zmiEzRKd4+yrkKtWLm

ksvvgHhmLYuJxuniEoVVfwbaF48+wB33s8QNMfz2lYMr0EQ1j+6FRy69QmzP3yOGbbU99rNN/zy+32m/

uzpt+w+fVXj1ywtS8QP7cPiqr+As/C7XJB0w1cL/6F
6Xd59E0BE+YoDiSaobN7atZunabESw43/7cdgOdmAoTrAXlT9r+CFtT5qBtc8gUSWqhzGZd7C0UQMYdO

BrLASdpyZVsII1l+BRQ51P4pY63C6/dDorZEETCPNi1UGuKNDItlfa+9gRlXJ1HRdLtiScgzPH/i2fNh

ygMrCHYmNu5PZrE/GJBiIyY/Bp54iRh7mFH14yyFdI
lYsxTgrtHru0HAY3+X2erhnUjGsQWNxtCe+1hiWt3x7JkIk8bj/toLl4YR3YveVsED8a7HMbeAba4XnE

K+CDsg6Ws44DD1fmP+W9igNZCtBtP+DXg5uQDo3Rh1OPWtmh1v0BDZDCHnKh+DhPC+gSix1hSjJVmknf

ShcktpOQvqNtJZfAxdDq4oi8VQ79FVm6+CafxWxoGJ
RCaxMZ3zZ5c1ft2dZHjugH5XfnrkH/Efc0uyP3Z+B8l8rxEEA8bv5QLa/rJWJbfsdx9rpiRd7JS4A+K2

O7EcUQmRZyZMEWFBOLour1350ulXtiBSbDkkZ7TfEAGv8Q8Rwoog4nCECH6P82exq+1hzs9qOb2EMxyF

rXhWs1hOi4fe0pGceGuwwXIyLoaA5jXqw2lXGmvx5k
2tAkX67Nf/4SaDOW2N7IeFmS5kd1YiMcGj8N1faM9+NMAq/CjtI7AGYgzptK6gIDlk+CohQYwTdFK2uu

+SVn6Aa9lMZyVzFC7OuPTq7VDq82qILxqefUgNDM9y8czdbCD3xrRcQ/A+0xEssgTQLAV4K6HgCqA1TI

zBieCSlGNFdL9jUIZpu+44ojPGR6MqZpH9RzIxgpFd
L20oRPC7YuH2w2/vm2V8ZL68DGryoFcye9+zNQcgQSWaHyL45IiGXs4ba1O6+A0OT9lsbbseqQb20FQ0

8NO20C8g1tOgIQGj96hpoMZLOvww9Zndsf073qUiXhfSW2psR6BsNhFsbCpW8UzNF6u5H2FvykEBxFLZ

2iKZckiCixIerdw6q1YWxdBIdx3pxW3S+y+gvR3VIv
QF6KnvWN61Kp4SYlM7tWHnhR5ohBQBJJnkeNF3WW268B857vtCc5N9PgL22vISWfi+5PQ3uD/CviAzFl

P2t6+SYsU1nZBGUdh51eKSY2i0u5SseAXSuc4IKuMjsEr5ff/5b6Jg5i40ENXq8SW+ElYVKu3I92FowG

AKuH9Td48PvfopPgEY8NRU5aPGscxotM2Kyt9Qg9eC
qlUzbe83v/c6QIcerH9nA7GgR+/fnMto1ftlzicro+ZHBtyktG+2SosN7h5ixo5OSXsoECks7UK1n7Jh

muMAbWXyVuS9izD+G1Hk3woCPZfotGzhJOn1DzPXBib0TZezGOozSEyRiUGpeGy67o61/mOCH3HOL96u

fcy54AYkjrBfFLbSm7nBiC4fVsiYn4KhzpG+HmSNCk
EslxAeG59j3vNUe/rwcKotXsySl81eJRTzXjgVD40SQmz9AfU8kfq8vgrfDJriZ/14T0i9X0Sj4rKZkV

3zA0l1L+kI93wweducV85+m5ZTOL4QL9pWLIEey7WO5JFDNsOlEAVHd6k2EkfopT415qayW48NxoCfQv

bpdYgAeOBVM5UsrmXRkN0kY2ZA1BNl+t4ojGsAJDQC
R/xLbjV4jWlTwKGu1qcTBAwP9sWVEERAmlcQ6+8JCz+PgOFG4497ZtkdWEgln9W7NfiUokCzYFYg0sZB

nTQnD4SbIdcqiL9lYsq9UZV/bjZjPuGyo/xy28bANYkELDFbJ5oI4OzLMCyub0n87c7p8pfOzSZU8LJ8

0RrTdDuv1dUKbuoC4g3ZU/PHzX8cbDuAdNeKFKfyeh
n+Aq0zerZ3/n2YQCTNoLvRNB/sL8wCLLfZ39u0dLIyFRcwh3sAlcjSEzbGayEaf8kgoVZCJiphJ2Y5b0

sCZKcREsOHKRudeQuZqCirX4e5xpwn3N/aH/LWST1onU1DtVl5bsVWj03N+sIKEGtbk5E8XEZ7/ipW7y

1hD2p7BoGWyQBKYF4xf+O1HsEX4wDMR8UlVSGzbWt0
zSP9cbZIHIAzUBnV0yVxJtTX/tQWemVyb8sn9Y7azDrpWyQiTKexy1BY1c0thqALW89SbTqAMhxOZhuT

qQPQL01Xl8lTqIDBz1YHnZy2wYxB5rN7BIbwoZY4wIl7miVZLhqWejVRmSpubr/3SDOJUk7dwYY1Pl+X

NKnQ3CjtuGBB4Km0VpuXTxl7sTKf8dsAViefc6MZVg
SXDdJwx6PoqciSFV2q0IsAABdFE9ezweTX584JrocUGJv0DZ4SYkA17IykQEK43sGY4fkUhdcL+ttS0u

wfhcStFLPk71zwfEzZQRhUMO9PfOaVEHiOIezmP+lrqnT0bI20VsIoKlFdgt5we3D1/5COwde876WXSd

uOHJc9Kc5yOLqxS0feM5Ft7a+qCXVnY65yX1orMa8L
8jaagfuaKNXNYMUEVx021aNTy/gz9ePYqNdjvuZsJCmHeyI+P1SCn72KaXX9au0CMi+vQo2RuKbF7/ro

EvvG51za2TBY58jKS/OH5s8xhp2+oUh7ZqtkKdw0ExhmjgYjNvDQabBqVes2SJ4cIO/JfHcFeIdPuzPQ

p3hsB4eO91dxy3s0hvtcQZm2OrC7X+RV/WavIYn7yz
RISnUF0WXFVD5rm4WTm9cOFrbyTWe1U+JvDRS1cOKu8bLDNw6g6RjG+Zm0+jS7mNOi7s+qe9MmX2QhV0

2cDDb1W86qxr0DrhF2jMdVlOJpfaMY3uncPxaLmFW/a4T8gsyVRNkjUDZvqKX6SIKjgW/9/S85jcUvtj

CprT+fu5YEzmEIpEAzZIXdg/vLNXt+2oiRuLw0AFV8
2QDbQb6schaCF5MkVmiC+uqPFqu15JrZ4fdFbP4w1HIpnaFjEMnfBE37Hsamh8xsKdC3mK6Qd+cBZIU4

pem2iMjQgAQmdKqCQBlIkVcrn/FghPIbr9HbB6H+d3Pj8M/SO1gLXE2XFHy70rsCGyxiGpV8pV07jYPK

OinKWBxykVjVlLjgjMFx0QRjNm3jmPdJLget+aZKgG
37pMCh509QyrhMvxN8p+u6HMbo+zgQooue83A7AI1vtB3ggmyaL/edbYksOMvyXlWx/itKGSyM+J8RiL

wDiD+xLHEY3A28xny/QD61aTKf3qg9ZN7TRJEDRLyVVdxlBua3fyKtMtvHgOOQy1q3O4oYA8dK/83R1R

H4VMBoifAtgKD8TeVXlAAGgNaDGDKXJBM1eITwCeQi
nxUlhSvGfTWfDN+gPkC9jv1F0WAkc69y4l3RedJ4m7Altjlqj7Rt25nvDQIRsrW0jT8PZKZq4aFqLn86

kLNCJc3uFauiLtl2Gjk55qg+Nll510ZKBArUxGGmc1yDHpJiUaC3hq3o1vr0ea7y6j2pOCWQnMLcI3pF

+X24RXu3RgiyNW3Sq/fFf4AKD6RvBO1jbp0Br3fCHW
lGTZsrYgSuIQ1vMbAcTWsqHU7VyIA4vhbXilZ4oqE5oDrTftHJN7YTgnlkYXLE4GjVikoXiGlTyldGlt

EC8+AaPUyUIE+8cXUe7kPGLt7ntlexx3f4motH2OZbVIiSZnSCfyh8ijOoU/q29XQ/wMEHoOBPBhCwHu

ymmwkx/CVTZ9c1yev/nCXNx/8xKm7FwyrxBOXglKgj
Ppbe06hcprkBoe/ijNyjg3x3G08kEuO63+bsPMQuVIg3OhLH/ayv8blRFlV3i51NP8zHXHVUSUZiDUs8

iEEyW1fVdWJLj4kLbcSEXDcfA/FruiV7vHtfm8baLqUa7LHxdMyjAT66yON3BC/BV4TMLAkpo5U62nmb

cBwMD8YIvFUL5YeuIayr+yvGFuGPryPtSr1CplGYLV
cSCbBfnmxhbNkfLB8bAzKhf5FHldomY/1sBs5aO8r3aMRJFGaosz+Im0i5QCAGMH8+JsUK0bqnOG14BJ

cSF3ZOUuAw/CPyuhidVLfG2pRaACcQ9hH+TTs1N0yqYLxOogqCuBmAnVK7LZBXzqCT783mGwgdQDwAj5

S138LYLS/5+5/0eeNePy2Xu9Ay8mOvx3w3b1y6xipb
ynejFZILi/kATYD189+BlZp++rCIhHnqyf/drsPGqXegrw7/BJdZLQ/6BXGel74V6HfBr8+vEMTIEeMM

0AucjzXbv1QG0k9QH4ZYnzym6gR8NoVveHw9Ctzvl4EzlCTzGIThFHz01YeNmaZX9WTUkaboItS2dQ6K

N1RhEaafNYvsWkHVhWB73KAjG7Xyu35+uky6MH128Q
92yF15O3W1paWLhpUWu+mUTuDe6oUs9125YD9PO3q67xpAjiK/If1dNqlnL6S8eh3GbkgB2zkVK2ihHu

zbvs1DkZHtjO3Pfu3Qw/XFCx9QWtKQhXXSZS2Pa9ZhUsl0csaRrvv5YfCFk6Kux8A8u3s/Ww6lItShCp

o/g2ojbiOx6RgNN4sGSgKyyFeMhZ1UNZN1QdwZmEYV
HXrqAVdwXqCB4HFu5xtX/G2uqy8eYCAeCdfHVfkgngd6nutfKg8djCi+B8MYdI3Gfk8/vd7VR1UmbrxI

L7NVRtL36PMzJkzwH4fXmz81IytSkRrbrOFvsaw2cFbFArWbQV1rs/F4q28jSoqc3eJLXabvwvlfQKMb

W2oGfvfJUu9CYtC4PQPHIoSuWB9NowWIDOvZX7eH4a
nbb1E9+lBG/4xOVknBxOE1lOWbAMOs1kQo9sanTMXq/TMaM70KdoN4oa6wrv25Ada5qSh9xHyTyfgyRm

LpsuvMzVejlUwATzmK+qlVpGZ6pYGpJNdwvpQWiIMKLn2NMyS0P3fq22wSmPyg4f9zmW34qaoJK8uRQ3

yPir4CicFyOGCRtWQDP+j9JLaNwlwj+sb5qv00dyq9
g7Q7rcQmVSxEJr9cR/5z/WJI9n8P1+Wu4y8/5EIHPuW0weok67/nqB12vXBfVmKYW8zOF0peDZJw3J49

x7Qc7uphmTvhVIchAV7og7cwDEFlyYBTVdIlfVZ5yppFGAasej84KBjRcMCsAO4pW94L+nw/WCdmiZMm

7SzuyHG7LFqqzjJbQcPoj6sHiXW4lhKXAYEzlfSzjq
8Kr9KSo0UU+fEdYFnyoTbKT81qEpjG/9SAaUO5PbxDZO3Hv7CfS43NUImMWdccw8+EOH/xVnJzT9A63M

UNB79E8ENR61Xub1z0jcTsizijX3+9av7QhM8U0iNGAIp+8DksTeFwpOv07LS8F1pKGd8R/xrNNxTjm0

/DOAp3iLb/XPYqqUMxV52FQVsDOurU9F+q0WLdM7S/
ivFn93tNEnF51D5zgZ+PWCXRCl29/qDxTsiwPrt8jlPAWrlmF3pvnOzL0byd/zggQZAeSXt2GzsPr75E

H/lyUTa2PX6dfOwI9Gx2YiXQI9PgiY+1FLDuAbx3JDiC+iG/9dWM0x65iURr14OXTKMMZnbYCgjSdvCj

ZxufcSv3yU+qgN4XhNC8A8/uckRR/o10vI3D5RhPB6
j4syIetKqRJ6klIJr+6RimcIZYLUB/JQR88eVy97M685elu9UOwAt0Vqo45U+PzOykDR1x1PVew7/5nB

OzwJNcbRSKx9t9lhyjJxVrtcfFDP6pGiZtOA6iGKrQrwIcpEY/EMJeL6RJJKMAxPeP2HriUoRtDxYvvm

e6LhQWEAtbXjm7A8YCPV7cKnqSgWfUIKEVGBZ1tzQs
4C73nuYjOB0VZ/EM7IEBkHVYXzlAk4MbAn+dHJe0d+ayz57DATbjI3OVVm9z1rWHSjRHJajU5vReRpBU

L6ILxcqP5y9xL+nIIjHI71Nk1z3VtMD807fqQ0Z6iRrfqwxAmjDvuv1F5V5p+gylxEcYiayt/AVs4PxF

D69X0Zs4QPXGIWdk++VUXaAdZSPZ3JjcCKzYOwx+Pf
PND5Ccb70dyXhxnYYVBO7jynX0RTJIDTjZuFclZUWNZYiZqxXXYPg5/7eIZE/yfb4NX8z3br+k6dqrgH

hTp8X5v/buSHQnqE7qLgPYSp0+i0pNRNfIjrRDd2l5RykKn/s7byBfKv5q5RwasDyHuxfRtoHatU47gQ

Qhmsi/wySjI/8YPU1JAkL4ulL53hXvVNrBVC8CH5qg
NhnS4HlhbK2wglxy+umUmo9IRrMSbBg/o7/Wd0h/Mh5oROUC6Gg/WHq19V1AaNFVNch/qNWbAnN0ZMjS

//c3fyvODbq4HrZVvMK1J/ZMsoUZvr7PRYCFKzBE9RWZIwdwmWte1BSvXoo3ch/6rTz4NvzI4+Gz8nz2

SIXvwLU6TCTADOaaC6fF+nqKx6awg2b2upSeDQ51Ff
al6BiUETfTtlu1mXy3vKdbMGdMZxzY1ss91F3iYvrlp2RPHdaXg+w//hahVBCKTbFrkdTBb5OoL07c8X

wH+Kpja7B8rOzhR3Ow/A7jVp47lgYuWsZWTC0Ah1mJlzv4QScXHmmHeD1K/fu+dHREdWE1qDsMTNXi5t

k/XxDVukxAGZdXdfD3+vwNgPF+ewMNluUVRnel/PCK
6GG5fdcBVVf8N4mWNP7pozVsZ+se2ocxeo9gD7ypRFWh4sY6dH1y9aMiK+oGszhHYIXlFX9C9qeY4e1A

DWjaz4gyNEJA4W42n9RCXpQb2jbOvkN5/Vi8X/PZ7o0anTAiW9Ly/H3O4pxCE3gK3MpGS7z7sXna8czh

63nb081mgqmZ48i5PeqzQwAyK7QNXzhY0LzR+KRep2
GrJnc2LyYplraSchykw11l44fONr7hUwz9V/wdkQvH/JkXW3W5keIHQP+AsxWARqVz8jE0UgqJZ5OvE4

vnisw4cRnVYSIEYNx4LE65aoOwXZm5hsg+147q7Ca1DyIqRlRfu555iYdKpNV5ehVcYpJkUZ5+UASe4x

hiz0FAk2e2nLVckqnseq5iusFRB5YbL5r275EVUeSX
w/JTLWX8KQ2lE4rjNvBR18tWuCQAOvyM/N1IVDrqM+wsbLcrOYMAdjOwSjyyP1A4XNR6jUwvOk+IWpyG

fU8HQI3Wy2k5RpZK/uUKzmThC01HLEtqsdQIwuXfaoWTFntij8Rx+xsFy5wei4EV8Ok44+gBd4AE+dRn

1fwc4z3+4q6+Aggdsq5eszMGs3okb7EO0AI7dAbpll
Of381Rxfh//EiobLV2tLyXa1crVRUoxla5PTQoxHLelvh/MeFetpymdq/T8F91k5xr8J8chl+WQ3b4m1

789OiUkqqkg6xdjKqeeZIF/ORMEOo6Zd23YYKBuou2+5dFmRtffrRCd7jm17LZTAB5tsvNrruFQC/Ots

teuu/tUoxOod4NlHA9kpp2Jds1HagX1hD7CLflivvH
QfS4EzsmJqgmZwtUGzAgdP9Oy16nh+sLljcp6KR5WlT7pHetRI788BD1/XStUQHuc6t+c7j1qSZqAL7D

XH5UIHQDPMFboAImBI2MdcL7PHehm33sCCgIjVNrmddTPvEXcuswtjzqCd+c8ooyJgjt89LVLg6+fDtc

rFBiIBWrQLwX168sUlF13jnSRxdILdcjF3NK4553Sz
Uf6k6mnzpz6bMz5SjPVlY+yWazu2tFoMbFXrTrgSknjwZptagoV4lRX0UCBEuE4VVLTY3ZBfOoJnsyKu

ytp39X8Dske4GUkFV3CuXs/i9/uc01rWzl5flfowwmSR3ZP9vRa8vd0h4GkPYx40if6MpTiLku4pk6VX

vymWjpffHboVwhlo6dwIoG/u3VB/I5D4xjP58//dyK
/l0/02vXOkC4wHlU39IphJYPCUZyOZ9Q5rRImxz7CaEXnLUvF48fClDP6T+Qg03Om9LePTYtrAvnc732

6JLuUNHcRrNGVfI5CSxbG57b9o/wlon6YA10wmKqCV9VOZEFcNOIL7jWvJ8jbrCbTN22ypEmAKnb9vKE

HeoDMLmRo2MK9w2kf/LgIDuvZp1qwJhH+EL7C0Repf
DlZalnvNtosgiwVoxvkDC2l0d4uprU73d4QXbqn69kvnOzzz7EIyBgdhEKajlXtIXUGWyMAqcSseXruS

Tx09tBObCxYQSUQWp0qEI94BYGb/14B/gB9VWg/2vAludWanaQ2Wtgxbf9M2kFxzZhS8ucH4Vhm/oNU+

QPogyQ0S1ZuZUv4py77S1nnTg7UqYE6J8Z2HqEEOGg
3de6N0zHWn4+sl99mmHU2gWvEyU9vB7jbi7vTjEGnQ/PK0X5J2GuPmIqplBu6bkwfqh9VJGG2kt/VV4J

1HcRyUlezBNoukh26qezUVdCeZdRe7SD6666bYhujQ0sImIk2Hts810LIstYCyyE/eptqcPwXh/jczbe

qhiJexoxe7Q/CCl/yB41xbb7BgK19G5QPkRBhrLahR
J3HBY36V9r0Yvw9hEvs3V9oaqGfAQAk5ZVlritKKkDU4Exfirp24BuKwumRT/o431mloJCrGoZ/i7Cih

1gz1HiBqAOQyydqkFysizoyoJr5fesOVjf78wD1qsT88X6HBgbRtFulmCd69hZlW6cJxoUL1KT/aPgjL

VsUKXnH3RMDlJy6VTmVXoqj8wi/681z1m78+a4vg7h
LFqjRKJlP+cQTMoDklH40VD4KeuAlMRR78epRtQBUS1hY1V3F2mjotq/5yNqLQ8U1Jp1VhhTEyIGzOPq

On3qla3d/MVyWvLv55iiUJR3MmpZvnIahRWOD/Vbi+R/bgPTmbxEfoZpf8oxRBjVXBJB+czuGARI8uMq

yEH9sJauyxB0rUj7OqMlbXZXreyA8l5GcGUY3CM0kq
w4UpTSJd5AY/lGDmEAwcnO+BXoSz4/vxRRa5317sEb16/saS0TiM8MAISGqyw5WJYrEWpXaCVh8/GhHh

Wul3W044bfyZk7GqvRoN/zIC95ZMoLchGo72SG5ZP+JJVIWx/PpLUkz4ZegAxEmh8ZMAxN8pZHpgCk56

D3w0Se4o9OJw4HMU3G8MCJlbxQnS58lYAXMB7VKwHt
YSstOKkpx+Pg3k6X7OI3M1hQPXedfNghNXOV6ScCIO3AHcI/kyzSNpyV5uxdXOGF/McnA60iahq6rvmh

SnsEO/nZl5P+dkCkujzBI9mzTbjTr5QZ0N4EqsfsK/L550Ik7iDSp+o+BdGjhIt/Fcy4GsRwnXWoK1Pr

jJ+Wg+f4r6JpEA/okgoYitTS8sKaL661z/q8P0IcdO
0vIMIXlvS0KAgJyHxZ5w830dGEFUpMyt/aJHUSmexhnemHGh/wjIUYsugGqkOSY7Qb6Yy4NUEAmHHoB0

fR5PGLpCutpICTvF6XH8vULlAQ2n8xaav1UPrwJ0G8ROIdnRiFZ5fDhU/VsqAzcdozuY4VGuMiAxSSex

RYHDbHlFRKKyweyWVXBLTlBVgh34K7KzlJUf0nL+cp
xcDzbTC1irLQkLWf85xNX+14tKsANkGUHQLu/3/rCjKMJfUbWHj36MTh6OadnXVRqOyqlOkS6tWKsNou

do2XqCbjHwkc0rZFqYU/UbPCYu/3Ht+MjudhRK7sackIUd1Z2xo7/LX9X8xVN4j0iLqc5+XO+ZPicKqx

SkNlJqrTnzvpC2dI91Je8gZue6Z6zcj/0V8pZQz5XT
kAbjJKt6tUljqKUgqYY1f2he2JMpwD++/xodmAM3CwaHhFzt1aZ1JnUXvK1xyLYypNscfp4tupNS2VHZ

JaIIbLS7I6PSr2esW7c7+R6ued73kZEZq5zt3Db5sNeV50yCyKo+Tvb/QbF5341XEdSA+o9e2VkOev8O

k5hpwOxQe5uSx9s6bmliw66A3aqPE6WcsbfJLWYIao
zhZOc28rE30wWtzNN7P2H+Jk4sW3QeQfvd9I81DJZBsgDjgQyl06iDcD2HG20UuXMUuf05UCmKq8dXNV

+TJloihAB8tEjosPUdW6fRmWSY/GiUihX4J8jk0qiPKc2bGZBrJgJ0jyvH4l82RizVdcJrIogK4QULBU

zEl/LwuBaUJIxIHmdBKgDt51Q3CvJgs86ey7cxeJnx
G439z+CpgpxglV+nWJpic7GI7ZvQ31CL4LSUzrk8d/wjVm2YT/ZS3C/LevN+QGKEewIWWI71qwnOheac

M1kbBnBSJuPnKbZQIOD3ESTe6mnNRKL10u+yzYPoW2j/8gvZfH2Kkg7mrm0UGHGIULgYkKPTlGW9/3wJ

l8i/5MeyZQcDCWRO+0VvsYcocxEoBWHcDZkRWI0L09
pkIfXXJ9tQcqzsai43MOji3KJ5Lq9GzbpGLUAYEgSdtc4FMAORyJ2dH8N+AMbnvjaQQ+19+uHDzp2t14

yekBrhoG+4xlvJAkN967B6NWtHq7OMDBT+J2vHpBsVga/ooWtelwvaHh/kBm9pmsXDlnkOe6/52gddJC

evA3NTksg2J46o+M0akcyZiueb9u+7cxrmg0KiGT/p
0Q3nxV+QWOHbi1btTM9ZtYFnhOOXZw8vu6e290VBopfCOYrG6Obyo0maL9neykpo7UN5ooht2puj25Tp

w5kl8ewVQl0gMnSwjFt/EgJ4ie9vj2ANd0YZXhOhoui3/387AnCEzCfK/OIjJpZTzeV6o9aScyY2CqPq

9X9zYQF9y4seohXkn4G/tb2idBiqBjSbTE1/nQZ9eu
rBI6LsnB3kbuV9dyk4JLtnOBjE7eWk9n4Vi8br4mF50KXyPRbcL8YkjuP7dd2Hm5LgMW2Ugun3CtEnGH

6EohSp51H9+P1u0fB02Y1rRlqEgElaYmzqeFGu7pHMaRkfAMKe6Uc+/z3IEbupAnh7KuXHdhmk589Z00

2qXDeoJlRjNgTM0ngrHuTU5YA4yOskJqYRQa/nEkW/
l7u37ObManNWxLMAoo/RQ4PdQpP+il2ETwf6xE02hbY09BVaA3f6Wyaof3C6GWtR3vlx9gDXv5ewE1w8

C0pc9nsoaOKBbycE5XTj4N5sNsEoXkXes9M79GhkA/SoqTi+mY1C4q1s8cAJQ4l8LZCzLA6A+It/M2Eq

znuSdiUnGbS0qv0Mo9yXXBEo9GgtpBY2arFGBZKJ20
KDlxkqxRuVH4D8VzkdSPPMCp7YbpKrYqk87fk3/JOejUFwKxP4EhAfPb8c0m7hdjRgb0GjqSYE04x7eh

0xfT2vcuvki0Bc+ewAIGiKZzAugV+hUa84gI/GXjJKLVC8NPjhpmdDfffxSZyT9xMCBkUlTNn+tbMXuv

KEurGuQwVrWBfmh+4LtJzKpu+XA3Ks8b6pD+bqrZ6r
iFFwTqUZXKVYWG1ySy1xNddTDHdwqnnIfDTHsA6HDe9EzL9lD5I106LaaIbLGBanrACO5C+XRaqhKMEe

AK4WczFju+rLgQ8w6P+hRGvNuRVP+elWQtiKJSLrScIRGAGcfge7SSn8Hi11070+KOWWUw6lDrPMJrdO

T0fnpNV5zY3ADV+kG/6JzF9W4NkJiXxI0dTWeJSpRv
giTHnnxFPLdN8me9ft4RmJ7mPawwTgxbp2lEsDLfZeL2qeawi1t61LbEST14oml+LoJRCKhHEMFUUAls

rat0CNIr7O5yCkryjwN1A8N0CWqoxjlxgsVjibMihljbHyntF0B//o+gcz78Vqp2Pv80JxobK8PAUeLR

qOBbjRR289VReiP35VNjEkOxQ9q+i6xBhUmT1v0l/N
UcAPinGYJbxW8cNfyx6rtUoJmGtIl2sdd98uBCrSwU0Qe1AZ8PpQ5bggILML6H37uvb+60px+8plq31w

C2hJHbTSeXGLKBd2DZjRzUJF0sMxC1Cae0/DzechO6EIMiFFf84D+Or/Ih0XMIE0VJ+jNdMFZBS6mk/l

f5yxOAs5QsoFzb7sfQDILDxM/VVwW4XoEYQpc11+W2
7AvkOcyJ/WchxzhMpwvdwZyUzrvKIIu1qdTwi+3KvAP0/VZVMji78Rn/wWio3qdG5qwpsml1Fg5j60ZK

g606X6x9pqd4cmU7LWtJ4dMdZu5SH/5+20sXeuhNpBDHPdvayls+YrNDL7kKYB/RrtjoLUswh4IKrIEk

V/MqkRD5qw/26Ue3P3VEGc79YsRB0pl3/w9TvCFy8H
I3xDBFquH3Of4MhrLFRBdwq5CEToJvTEMZoaEccubt3k8EF3k0wsaurApcEg7cP8jv8bkxzNklPBs6i0

y6y0L68gTjmMUWrTNL9K8bQh01ECgMoxHlKrpd6IPB7TQ9w/Kan1oEjUvQRWsRiKQjCCMyYLelrtcK0X

J02p0pI3t19w34/Xh+ZrlLgsn7t37B57tu2/4a+3di
qll1++AhCsaUsKQdad9Mhx5UNDuGT0Vt1SaZwL4u2fpuwQJ5gZKoT5A44TS52zuQgsWXiLi5HEikx/KE

+YKN0SPrekmZfnlkrtsn6ijKapD16oHJh+elMlBvzJ7ya4NPOuEVCmTnmGBi3Td7C7k48d7XwlMeLdgi

cK+R8El5kt7/2tWTsKpn6w39Fu2Os4lsugJEck8kGN
14JvUmRdi0PKTHl82E4VPh/sNLS26+gp8cZXKr5zHDF16Sp/jjwf8s5YvUfLff96Z2FbZtflKnUhNrR4

cUayxvwgNhumIIDRLTiJ84Pq2ME3pqHILZhOt1X3sD6GV229zOx10mB9O1L9DdzGyK8YoLHOtPnxj8nZ

+x5wt1Qdrn4lBKnkm7+4hatYe/xpSRvbFAxnJQxuTo
cE87QvsyZ+11R6CpMsU7UgH4BqA7uOwUOl3OPbIbBloDr6yA/zYhJe1B+JCQCmw+Jg6aLi2XlN4yNgTg

nSR2DUlo6iGN+x8/8fu1GH1R3/lrp6KszldxT9TBFub4t90k4Xc+5cmVJFAtbTR7bPoSra5Vcwyqkx+Y

cuCNh0AFmHLqFPgdPO2zrcNb90cICpXOihQ5HZClkI
55OzWyJbLyF44Qmp976rjPQaYNoB4KkEkZlNO+sdU0vy6g1561nmZz2gHUXKm1pjlsVkSeheMP6mN98n

KdGTjCP8Y3u77bc7jJd/LnzITdzQ5LkTPSdxpJzI0TFsJU1dYvHaD7e6/PmFkKdW5ddrRxl3hK4iKpaa

r0o6CcB1UQW0cT3Qa0Ur28VixKMbMd8YqFAQ/REVqH
f8p89h6ge0DHwb/PIo43CDCmdTNoYd5oc90VkZciiAZojtzNj5Du0nUk7YdH+FS+kE1ALccVUfzHG98v

6YX9AMfBe3b6IiwY3R+bl8oj34AlhpGCJA1tSTxb5OAvOblpeFZrCaT422hR4y9qkeWtUztpfcAii5CD

z6hpneuFAvF2iSbk/+8TXBqrjMiZdD3J5gW7Wis4Lx
C/FiJEM8mON3kGKB2ZH8/ooBuLN/lEEfiur79yYzdsX2sOy3slfXftbmxRtPD1fzfS/AGBNYzRGTdDBz

+Sbl+gdhOqPNa8XEngwc6h4owym+3hKP2XkxECH5lJvHArDdik4abgtczsM6xW9W/zBViKAeNX/0rpj6

l1k8IVTD51ZkMydWbFl+jb95Q/9gcSo5yJq8MiVQXa
s1LyerZB0WsgVTNErLzHW828pbey0nw7mdtT9vcNlQN2fuM4AMzYoqG78J1yXef3c9U4DyvVri38rZTP

+ADazM7I+UQY1jfDNh33De2QM7zys+JSzim1JGokEJ1LRDX9kVmI7uF1r23Uv+2ZDoedN0faI5+0IkRH

3hf93nD39kFXyG6cK23NybtMwx9VXknjcSwJw7/VLG
YWdcMXchgIKNwDRQvFPxLhd34hbYq/oacGB7IEwdL/Y4bbHlDArT5n1+fQZ/Z4RdpwUDiKjveStRxKyq

JeSi8dbWciDW0hrlFbfyYM7tJZ+8i1VzVtxQcqgQqI1dvmds4juzqzuwzVDcMRZMeL0vaz1Q+jYad9Zv

KnbFsf+V5/KyCNk+leRx1mYb9q/UR0N680+w5lUkMs
+SV4YcKp8pUi6gHK1mgqqzKZZjbanUz9A28fdPPd4Jy2idAyH6Cpn9AWFSxdjqIEARlcZCei6wVh3C8S

gMI8v3brF/H/R3sWVuxBn/joaTzVwnsTX2v9jv+v0jyqSjEQTsbjoKtzrs1kAldI4ITyebT9yH/RF29m

jZQDGDpjEytymS11xv8FhP22ZEuX6bQR/zDo9DNMoa
R/hah4K4T0HKP28b173iWLrF+bT+ee/RKq0zG5j4EpQDs8HQ+btBPN9/Uhr1cqDCNlva/14o9W7SmQcr

q+OxgKVcyBbYkZJFdINaekJfdDXlN4x521sBQts+f7QDWktXmpjoC/mzsoLJLrd6WHw5pAfLwR+obUzm

vQMZU9ENiSCNbYmkT7pfOHYF3wkUhRSmGKN9kAKzl4
vTXk4UK5jvd8x2OdCB0Kv6l2eZaGdjmqNUUJH9dBtxxj5gzEzpmACV3nxr1NOhYDsWogKmE0RjBGY+h2

arar+pczX42Pp/u+ldxEOPAXXe1goCKY5/GXF74mVqv1dESdwXHWVecmB6zYH47A6o3Cw1SVLDONajiZ

/w6OcKMwYyHCe77KPQJtwposN489FjpimhJAsFaHfl
tJifOmM4XsFGvouLAV+cagpSNmmMBXQ712Tfnnif1QPuTQ+3LKZj2LWlkBVv+aaXNeyp75qQlA1GS4AC

72xZDAM4f3jq08gehTEy3a95Yt3UjBxzfE7yZhe1T13MQR6DQsgj4xaVlup4mQstbYNJ5olKkrUW8Z17

4LwnBDg8ckKyjxNtIVSRVeXkfYMf7FmWG+Qqhh6MH1
iwhywaPCenPyfY1OL+AGq5E1zmopuZgh4EDgR0lYkdb/UQqX5TDFdJSbsOk+u0/GuTGw+mGp3uhW+6+J

l0K4yWJZB5Xn7cnb7hCoMKPjuNfchN+h6lfHdnjZ4wG+PNkdAzVO7Z8uxZhHTlKwe7Dh1CyySqKa3Ny1

hAataDKU2y4PUzeiOglznelGiKxTRgtjw2AQRbqePY
8C3cDhbY/eNP++QSUhIn8Dd/DM1z9nvr8tKhxKxNQJnGprJQifehMA8TFf2ecfFMTj0g69r1CEw3jc2G

cbaD9lMEazlUCY3CLJe0U0d0D7AjKaVyJDLa43zA7FrjKle7zyM9fgQcowL2kBSY3zUxUCTyVeK12Gsu

YI5NIgVmc4H9ygOPsqX1orxYph0KLooqwfmGr7SUVe
H6Xmq128lWIifcqt4xmxTk4iffX2iZZ3p6E3G4Mefl+0ME0bkyQ/6C3KIc/SEq9tC9H5MuQLL4T81Hp3

R3tjwnzmsoyjC+KcFsFyH0oZoQ7okwTVd9P8c+Q1xSNTNwbJpofY53SCZbTQ8yhSnwV8iMTIr35NRLSZ

YKs4DivwUpaQjDhukzkG+F07o4dlZVRc+b+V0960qi
ESk+ta3d8o/IYSCAwQpba3Pn8jKVuN+0mR7+m9ZlcjaMs7kphkZL0+2tZ1f1kuvtYtaIx91xKL79MxuU

PtqnJKRx0KaGkj0kC/LgpF/a2+Omr7hKhTfCr+P0XKQe+K7e3rprOQhXsayKdXZY77fYIegFP7svrFzi

EWhUHCj3b/eBNRkmTsa945MHdbv+3RKZ1T6MUsU3UJ
O8OhSY7gWKB1V6unfVqffXka34LMIr19dZWiyZJtN6d9FRiFtPhXIiohX9iyfxhG7xUKxkfZ9dUc7SUt

srjwuranzBlxqSquLE7GilllQrh7hCreJ0xhngxy7owt47jGfC95wzlTaLuhsAeyDN3ySm4Drpg+LVMG

jL8PP/SSsDQxv23gB9Y3hBKM6NGydNEhRUDj56z2G0
g/HQEZYFQwIfrr7qyL0c0oSzi81TrrDDK+SXUd3VYsYo+13Z6k0zQezStj++D82hMz6fqWzsDOjFqTc0

pKXDmEAWr3D34DIfZ4gAEvzoubAHCYZ2OVFKsNvx12bpG6bXDfEs8DLaowoGwu+0v8Han/JhnBzT8XE/

kVOugmEfidQ96k4XCV6kyopYG++WPKWEjrK+szuoTh
+PA2RSFBciQuYmLHCglYMrQIO27bWpD070QmXYRH8F3stuyZpFHpTUiABtRg8c0f2ZUvsDMNUX/6w0kT

TDI4SYhD+4K0ZiZZbkz+8ZrMWY27AVZmuLyQxGZU3X75jNLaTfBTF2+PTmSJ/whgHp8jL836aOfJ0yFQ

1wAkDaQGbbNpATxgHLn2bXzf6Ulx1V6O0Y4UB4jlO/
04xlKrg3RC+QzAb4BLotv4cg8/U4seeEgyiRGBRBuOfqGgiyreD0uKfaqASJecvwPFAoTyRM2L0koPmu

aZI9UZck03WuRQQ35ppP1zQLqfQRyFwe5sJI9+Kv1HTm6ti3egU6P039xxvRUywr7jpCxj+YLB3Zjqp9

j6wcXezsZt/QYMGBlZ5wQsaRWTfI4DaA/Txh758uQF
7UlnIWwUKlTVAzbxRpFk7/zjvdEPp+Eg9nCM41C1LoX+0br4tkBgtok2CAfWwPfsi6/6OLsA3eG9oOI+

O39vMscXzkAOBiHI/u2zAC5hEjWJXksU/p4maMLZ984zSr0GxOz6EwtiqFLJfdVyeDcHfpJE2vwmkNop

X7/mnxsfos3pUv9CMVZE32/IRxbhcHHGRS3fzJRmBY
Qg2ujwUxM9R/Zk5Yzv95QxXQAmMIAO6SgkUfJzRH1m3qxav8yGaBVhoecym/PVfTL5lkNfryAsFq0ch/

gqRReYtVKpr0UGQ1T8ebgi+8bu423GU96nRiGpkCsuu0YWW7nl/JGOZePnoFECD4OsLwEvhTCI7soBX6

XBvZS7QSRL04aT/vUBRKXMxxXH7qoV82RnFuQgJQZl
2wCxhFz7wd1awPwgGktWGz+h+CTU/P6mfEs8gjzUO3QvFKxTVagyvTJ2XY4bHx+oezFmVGRf4ZfNrC/3

YQ/k1QMyWrQQ5mRdfQpuHBvKmLx0iF6Wm9SS5tfqqZESrme7B9T0zRmQknjosQ80y1jITQHCEyzvTZjQ

KVAQ8n6NZKnosuffOUTAh+r10DeEXkO1LIdN9h5IWh
cTcFwwKAtStKQ1xoZD/JVu7Igo8LevdCGqFgJmUB1+3cpOjBpLiWF8HrBrG+WdbB75H/rILuyNYIS75t

NK33Fw+W8EoltgLS5eUa42bOly2i/D6b7PS5gn9AVFND0xQ9gbXEw4Nb1Y6UX0UrfmGicdKxJk7GAs6O

NzAYK3Y2cvYAeZd5jwwgOy+xB+YKlRAehrj9pYwSLq
TvZv29xyiX4RkYNNOXxNex/WlRdtOo2PYhqZg64rigAd1w0xbntzXUElCNrHI8BjmSrW8JP4odqcFfIg

cp2nkyvo9P6viRTHdw9+Uo45Lv9x0035pDPPsk6BaoUPqz7KF9FeGO9wvPf4OpiLZYOYZ11c/yq3P1Uy

DXltfKwCdZsN8ceBGvrBFdjfhgLmRpAE8IhjP+C7hz
M51E7HwSDCgHXBA+8XXH3j0bXUox/fawHDzNc3LTRIcU8ELPy+vWL1TQvsc8iQyACnV3S9Dwo6r/geYN

rlOjwEX85kK7ZeBq0W5PciPKET62gfborcKvW2/ElvoHKRDbYqtK6kw1js5mcuLtQGgUajhPnl6D4Tpr

HcAvDz8u3YbpmXwNkr1S93lYaaUfKq5TnF7S3ula/n
Sxp5rh/1WuL7cXACQd5gfTsfwAzTYtGwikO80L13Lf0DbN/tn/YQ7s8RRx9CMzGnNCLRvv6P5YzzTkIt

F3vjfZLN5Lz6kI57nyulFFgjT9M3l8Ae/4b4Vi8EJPR68bvlGH9dQcTfYKPZ9KKrwDxQYVz1Cd8n8Bsj

yTUxtXXBNjO5Ga3BAnkU98ozPtacSUILf6Ev2fUNvO
Tmi5+kdAEMiy0KpPvFhMRhVSJ+CNBIqIxVKK6GEHIL4qfDnVa68RDmjCIuDrN+DlsNpgJD5t5DUDhct1

37REPWXZIelvFwd2Wr6pJqX92uC9nxYC9FxPQWG+Zhof4TqXXUOyFfzeSggetgdblA1kAzvfemp/2+qw

ipf78RxJL3YstG6IORHPAj0wkXPTSY1PtQm7XD9gUt
nhw1LPqXQ+jyihRGPmuPcvyDxk05C6qXwpKgveR+GhvJ+vdbcMlh9wqTow8A9nUgD+wP9sjetFoHnRVL

LyOnyvqnddTXYqqaD4814dT+dnLANy1AMRq+PqSdaaHQ8k/f7alvABroiFHKRNEF8whlmkdkSBqmw8Sc

F7SA6ILEpDRdeLWO3o4MyOfAvaADj7oPO84hEKm04V
CWLK6UPoDnUbXcuSMD8ZOTkR8Ti1Hd8fez0w4yXH5PzhECgnZxmGUVi34ODFN5xHvGhPYt7Fnop5nWlw

JKx2/B99ESZI8hM7tr0DWRYy6vfMsQsj5bZOnXOc1BKz62b9HzdPlPylsK4DEFN5J3P7yljglvGBlyXK

uPClNtGd07+EZg+S+MV9gEUGmwJpRLBIpjK88ZK6hw
Qh5rBPPy7o7aHHCjE9AnzqtSAZGne263q7ufEi6v+YwuNSHzRE6qg+E95onBuwNVaHGczYfiy1cy7uyv

r2gjg/lJstqmqW6/C/bRSpBzIcGVNbSQUmkabTJuh7RmEPG9E500nllc94hiWfyoupTXcWiZUvU7oHlq

gkrrzn6A0iGjLEg0E2Q4KrBCi7QTCwYqI0b0NQt8Mu
GEFy39Fzbt0bcosPXNDUFpYmpoBKPMXKBqPvB3tJeKRV3N/hYturpYLLdXtSQFkb2Hkxa88bV1QEZjGw

8hrms27a+ZmdintvZOZtzzbCGdDi+FPeeML+Aa/jNf6aB5rIoTda/ljfdUddWWJp1ePeizYLGZw82JeG

DK5PN9uWve6lMu3RNHzwVBGBM7dqvzSyesbz+41Acq
o2LkR1+8u13a1nRU/A2qLuG4Ilky0t97Yadz6s+xOXU2AW2JzLkfRLUzMUlFO3gci32jGeTZ2c57cKw4

7l3mHreCZ/mZDN6posEPPJSWnO+1deAvLt6DScu3A3klnqVhLIuj/ZiyWiF+ZPBQ6ylfLlZLdL25hoyA

xF4vIKTAYGpNbTMwcg2oQDLbpa9T76nARfEExJHaHz
tJjgwLPycW+hpVd46N5Zd/nFmWXZi06tApamveqQshmsp6VHvYHmojUu4HEltuvR0s4IDXRtsKF25S4F

tJ1KFuDCMVLgYNtFVPhGsH5Q17UKiWbdlXT/K9Je8I8c2XV56ZOMLEAyRZrMrtU9q45t84Yr06R/J5+9

eWqG4VYyRpU0VFPUecO0rYwHSf9xiLg1rW56fUM7K/
F0zrwEEJqr8uzk1TD/2PQqORN7u3hfUL2EDdeNg+Ub7/ZibD69pSqMe1eE/dL+uP9VZF+dXITucPkcUy

B9e/FJOrxrczPi5KcSBEhw672WHOrloZv1461xNnVip+/tHa3hUhdZcU6GQt2pOmHXjvFq/krDqSqMiF

IFO5APQgex+YcgVGSezi7oz79BNVZBTe01QWZGIxZ0
2JXphUWXR97cjkguVoaHD6nyNaWkTG+frLxIPO5e0CvmPwqLvEjrLLvurPdDu1kZ/J/PARjc643RvnZZ

HQ4Tmb6vRVc11BAfJCRJx6FhfXF1vcZQr1ExU+TafBKZq7rnTX71/Pxn3R45UiSeEAPxtHy3amSpGYgQ

jq1XFSAu6qqzWk+m859koAc5o4SiF7iAnwXrq1A1v7
Rb/cvnV+xfZi9M6sVwML+lB7RZBlyTLgC8Cw8CkmeQ0FcFU5cSvtz6yMdJu8963Q0X8giZbgMKbDc4i8

3XVQ8c6eeFNepLS8PUcVOTWmSQQspryzjO0CdVDegvOpdhlkyd4sVCvUzxmra3/XeOsbN1QOaZYopxJ0

9vmDHasOP8T57VubxUV6x3TBzJ/2Rj6otGn8dQwytu
nnV74VCxfaFwkAA8n6l7vtw8I+MkIkGkudhRvzlBM2KzabNSEgEFUv4Oy8x560ykvDq9pgnuS9U6li+l

xiG4Ta23bI1g7Ib7ZWfQJnAL635lqY/JZoguER5Yb1eHK3CrxXlFBClLmMlJZhl4H5EI3jku9/C/u8W+

NF714nKz58UVaeK/0IqlqIb+9TcuPoywvGn+B1oVh+
J9TuGu2EO5/Zg7jMyBWVxwE+LYtYoI5t4nynKqDF7+O7H3asC0nx/M+klsRPKTcO5TsVR0XOS8cpdEZT

43oimvP8vK7VIIQyxUMaqiw5+BaeazRRcvVm4E6WgGEhcCgg1ck9VLutfwS/cSJXN0afm++ffkleqPII

Z88f9e+iXEunH3NyHSNrkxnXDSTibFplOzE6UEjE0t
sPuVDx8PQozD5nyKzfZh+vg81W50e0FxvWbmvIp6TnJmL4jRkgEmJBNztJyrBgPIA2ck8UjQUJIbnBVe

6OTonpjIx/DtGZMqWY/lmW1Ht4iE2HxNMSMfdCOQ4VJg7iDG8qAiQ+sR4n7+YOHjx2pes8kKFeQBxB7k

iI/OjD+453tfj21J4wWh8eKGO5kOTVT66+UZtslmcb
mBMYbgizaPWtyFkSh6pJkVqIyFNXGGuUknFuVo1gUsT/wPaIKAdcTzuK4XNjN3vugfCkJ9mCf8o8TZSY

T5fkxaPYHdn/RldW3+mwJco++ON6Yil4Y79CfJhk/W4TSoC3g8EQuCPAm2Wjd4OO4CzzoGUdpbmWMlKi

s/KxOiqQ8ORdusDFPAhVCM1p6j9x02TczyoJqHnPlX
OWIftrPIhw+qCJFDgvgSuTp8+TggnQt7/IcWBKQT8N9G4OyzkwnxA6DgN2nJDCDnpldr+qNSjg/ZH0VI

wXP35/6SVkBOuwKzkpXS6+HlMAJp3Imz5IDe4vz++MOgOGA/nNfSN4aY649eZw9HBlK7Q1gpqzQzN5J9

GfUxHRyTMnD/qF4b3BnFJcpYIWhuvNFNQrHS9PqW1g
r+QVlYL1lZ91j6YjbEqPTQLrpRBFvwaGTYTxwPuix1G9XhgIvha7VIbNbWM0Hr2XCMZLIDw1lgkv66e7

/1Nr4xZ0ffb8VhWDomlbpWzPKJGy3wlIfoNBXvKn3qrOP1fJuEvDDSKf/7MA1ZzJABtZ2oTgpff2z8/f

mXOYwo+x9LFcWNcXc3T0rPLZgWa2tYxbFpdZeZwojm
T2XEfKEPn2nClXgHKgMg/DqdcpRz78/a/DXjzlWB+PAIwh2XfM3FVMbvsnEbA0IsQ/Usa5S9w18Wsw+/

yAcgf9d6v2rDustIAdMyH6wrijiSh3Ij27mJZcDk88YzO7Ng8F+xn4l/RJ4kNdVUV4sCXhUcgncPwxzi

Rsm29RyCXLBC+bK4Jhjr5Jw6zp+x7L4CmBJErE+9Kn
M9HB2W779Zpx2JRhe/5ZuWY5Y10nQ2JPiyDKICH1s/BIoL0EPPVOLLDZuJdervMgeoq7AxvROYIcaVhE

LVxt/33Ln4O3oU1+144eq6RK6v3K/S1L5+eTfYXCbGu2ylogpskFenJc3Q175F1nCccuTPhk785XtKbs

BOIAieZkZHJIJvWhZQTOvnsara3TSPOeI2bmTFui5s
mhyjmgTsuWUufFH1E3h2E9pMocnfuG2MAjixRKwFZJxzF5aUYcDIX6WgwayFYXiI+yay01/NYABkd4/U

6TmVG4dps31QtN9Awyrdgbk9JgkosdeaQWd0cRviDZZ/X2Fpe0gi+mHPpUwD4+P7eNl6SyUoeLP/19hW

5rKWJwOdiJ9w2ZNJW7Ns2vmk+fwyYfUBljzUohTRRf
4rQA3s4BxeACeBUcU76OuiuPO+TSJq0TtF1+iMMf7z/pD76taSHWR8wJcXg4WJKOMgUp36aab0LQ+VF/

QgbpyyiQ1ikG48EBuA6nPuRzgjNZHWd/zRzVRjUqdZy/6GFyKWNyYVcfeJWw0eTLy11/h5y84IeGKflP

RaAHtFw//IpYGMDbMTSFfPbeO1HME0ohbIW3fXRNMp
fV4EASGJwfblJHaXdCnUtgPl9RXLzHmy3omIr+9DJpk6rsJG78f7GmoDbWmAbhWtyW7JixOv+6NODKnO

wjiKLH6b8krrr9QAd0WdliRMH3+AaYvRPNuYoqi/ezGMLP9OihI+hcAT4KGNYnuOeZhHXo3cPViwLii8

1Rcq5iuMcPGSPhJt6HXH5M2H7jdoYSgT6GtI9wiidt
Krl0D58E799loPB9Vd2uEtg7PAQonZItkzPskGLIrMY/JSV4tMKbIVEbbTgIk8owQ6Ezhpb8/Qx1RZWM

HbcvS317bD8QS7k1zlwVzaaA7v5ZjeeI9g6lfz8I3PlIMbhHk+4JdL3rC+NCt8sBJGD2BX1VQIgVOACJ

SS2whmm5fsfjpM6lSnmf/k2eWkO1hDWiVNXRQDAkhN
CSgEJUH+LSLpq5nSJe+Abhishek/rK0T5YPBTxx4G87zJTyO5WKrO38D8C0TF6igNRvZ6CBgNrYmrDP4/f5ukC

HVhD4MQMNWYx+NPmuesSrPgQpn1KehrwzghP+k0vGZBgJYd/7tn6bXE2akBfjkjvXpVnx8nMLHUSN16V

hSTqIdw5nb86d8tYzsefJ/GJ8qbCT8A1nO4YrQ+Wt/
dNqpms19Lg0e0+f/pq2suNOq1ar0hKp5dFrxjDYg81CzN5tTxfkYPcEBFNDjCFjFFccUa4wU6rT/aZH3

6TD3TxUZHN1aXOgxj1qcaPNwzWHPckHd+zx8h12uQJ/7DMxYZ44q3I2Cu7U3Bt1fEOoNxj65YaLmc2fJ

eeUzSd0snf3CSEvkDHwAIqMzt2naLUIYb8B9EuY6Nr
JCusNxZQLagNXzMOVmcomW2ZtwvMveXGUyRUUbauPX0MejLPHF6nVOY9jNDy+kofOMrAaWBZkoGFj2FV

yiyihJ7tMTQ84C+xL4gOrFs3anJlsMVnaWI6WNee3p4RDhJ42OSyvihTgCnDUPVdDtB4dh6FLurK/K6g

xlNNP28yC5ix4ZTdc82SlFJUGb2SBnMYLjStTfMjZ1
q+c5mFEq5bUTiuumicS6tuwbKs7isGAC778e/yHRVHSW19K7N6Hi8FvFbR6gvvXF5q4wcTQWOyjRAElG

W/thu7f7ZzazKmIWuOgDahVSRKDGaVGKZ7I1W82VOn6WncfeJYbd1GnGE2fYwjrZ92jfy/+qpmWJ6SYM

a/36qWNw6BEYMHgEpZL57c9wdEXpLp83K3E8Lwap83
abutkWQMa2Gv+ptRdfgGuyqdkBVm2lP2gxPxsZstzGYQ1vl4W2gxq+K/bCcCMgQ9NCY9JXq9LSQ11E52

oKw08O/9HRsvU7R/042a2I0IxnZlR4cFJNtIHB0a0dUP3ak+52puBrfeVGipCB1LS5ngT8hTO1awNL4o

ZGyQsmMBPMP1Qt4niYD4DJqcyEoS9l64CSwQs7gPyS
rcsscMA+oazEu2A52dE+sS5f0BR+53Zwlkui/oxOpPuIYYNIEowiAWr2+Fr705RNRHfdc+635wY2n0lR

FZPKmAr8lFRoxvT87rH+1e6nGUdilyNpKJVdI1cZFr+N648jE5Jo7uTaEjdbNNyggfa8yj7ctHrwekXM

YPbOpMhE61I1H54nJVzaO2HD9RIEO0+ZW6SaAMG29Q
Akek8uVATyFKeFrDj11z+OxU3dDr1r253BMPxx/7wSu+lkSx4aluml0yEhP4B4T0jFjh2pXDFWrq4XhU

K61tGiApjo62ARA1K0O03hqBTA/0hs9u9W4w8hJ6Rt0bywZQUYwUtYCwufnAM10yF1LU6TabJrrokMQF

eGXKrAvnsWQ/iBA2+lmKZ33P1OcOUbHKINLrpGsQ+s
zw6B1ACQJOu9jQrLeTO0G/NqKcL04YxDqM8xFtQxnxE590cO4jTsLq01Oq4WZXtfQ8TXBEF73dorVxNX

pMxEYNRl88pKNk42m08Syf8yNNHi4npojtOBl1n69PuyCmr6Ccd2VNJvCSxkyOtJ/rmRvGj3HFgeGTgB

dNMwPCgTocg3g6XUurZHNY+GSeS6KE6bvzeksBJvbv
Jg+HeiQ8+U2BINMGkplx71T4Ybj+ND08XJFvPNnCMmrCkaOX+nOVIKbzaH5ubE8OONZKxh7Tjm8QXDHI

osueUgI/mZMCNFWwRQu+QeYOWI+2Kr1HqIplchPxy2IKQb2ji8BorMKRrkXPBFDK8aHBtmJyl6hJO7ao

K9LjR9/GoiAw8B0uR3q/+Yhiil+Dig3NJlnO7oU+W9
BmxSy3ScHwm6AwTV8abZPl379r7hWks17nNodnXKlJCHHVzfw2JLdHgeAINeZBRK6zUy0tmH2k8cGV7v

L26eC2nXZ/ilIkd6aJXXxOLu/o/Z6D6HwJaT/Vlb8r8gHmyGmpixCHkwYrnVmhd3TUdgVsHmB+2HiZXN

LTbu+1H2B+X5zyK4yVxz20+AfI9vAKJNMtdMBgLqJd
9ppT6uDht+k7WTlDN2nKZ1im3042eHtjD9UqZ3J5ivWpqu+i3XuESPqljOzo896ycAlz8SSB05gvvBlx

xKOcWruCKd+3M2Nc2SxBoW5B/xdMYgD2x76TXDyAoE/vyiJClyXTTxTe8QCsJXHCBCCEpioqXk9nSbUf

0VUAUhsSVYO55vKSXxXzbHEIfHjComTwqJrLh+WIA+
XWpqgNZs6yKQLyD2q3J5SpqL+nICzETYWByWu65/T2S6pG5Ifehp0zi5ZZh6FDEmzIIqnjZMc2KRogEC

h18az4nKxwdWI1hN7w5vS6bUEp5LqWv0qK8rJFcJigfMqUIjbDvZgOAcHIYmsIld0Dc8qb7SdZulvIdd

ZZ44btyv0bq/9Eqe7wb8AHwFiLOw+DGcjfHnpWeI9z
z+UlomzGBVrhFsbRb4J7ePBcX3UiCGjFEIOzNT9KshTrPXukYxuDpvqcinqwiBxQAlUog5Zm+HtBY3uf

9XSCaxej2G56WYC4i4n34uLCJ4b7T4+DSemivfiVZoNMs6qCxnSM67+eskSExUIlhO3wtKxn+Yo+BF45

bmGylCbcx89UM3/GCHSpdFPtJWiGxERELEaPYOoeow
EKIINI3JePo9WiqcQvTrBS6Weiw85XUsaOfwTJuusxnrmzF0hhiM9VfkjbzDUlUyTWmVq+3/cSY3J5pl

kkgGhoLk37YtMgWblq+VbEYJB8O7RC5Vi64dQThKMYmTE1YeOM8BbQ5Ls1tih4hY4tN5Sx+pLtx4Kfwj

6xszoumxubxjjozLRp75XqJiVakdPT2Sa8M7D+8sWc
bFAVU7uqOVo1OvwS/i32nQ6tvUjas6EWugLGyGGqU0/n00IpaNHZeCoBDed0+f06IaD/SP744a8AfC9n

McpLpnQABjwyLMrinoIS48V50se8WJbGBUZFxMtXqNXj8o+lWJnA/7ImFCIPpmoSghPZQUdJu1W+QAlJ

l6OwCY1aIqNr43PCQDcaTvtiXZkBPSjfl4QzdIB6ES
L22NImvyd6DtcVfpl/SiTpGPurCVP/P2to7x66Feo+o48bmJzQ/2EdFxoffZX7VK8EfOm2vaAzbOQs5K

Zks23lN+cNXCvddzDJUJKFuMepT+OgC19izTKAfC81NopRzFbkAXw51//6me154KXRuvEekOf4VTdVk2

UlOK0Xqt0AnUPMfbvs6vm4a6nrwVMB4pooDwWk7TZH
dEY+PPNasnGpc2KyOtXOlR3MSVU4LOUhligCOIqy4t/FIdaQ3K43PkRyGjfb4jBt9oq+8SG/0pEEfeYp

NLJem2904lLT1Z2pD4LyOtMXQWnh+VsHw6vOFXahSNxsgjV3wUiynEW+uUn128cWHIJsW4x+sxl3HYxb

X/aYRJTmoG6mjxlWh+SIncVGC5n4+/Sb+LzakNXZ1B
OsD1MKZq08E74/ae6+fMqMIHZ93ZWQqT2LlofVgw5ZzV00/BktZqywFzvDBgZknumO6hkD8Z60efng6f

B/oT4KYawSR3dDJwrHnMpg/RVLiz8Hnr9x/xPdylOslQGJ/MFHd2p3u1+nsvc1L1zmmOtywjBYDo1+Z+

GFymrZUDoNhFwAgJVGJwVWajZKlEKFvgjTyF9n3rqW
zYJmrfX+hwar7NdorcNyTQZjUnbrIWrEEakFVrB+cVKbIqbVJ2nlVF7aTYl4/sHFQuTWyGoEOnsv6h2G

SCRNOpaEdmEPM21WZsVO9nrXgg3sooMPZiHWSJMxfNdoWXNQ7f7AVBMErE9ihfj9fttdFkTgUyx4cq/G

bIOxkz2b7VzzuozELX7vsLNBVdKPi7lxL9E4/HNqXn
92hLiJBPGor8s5/Yu9T77UwnXF+bDJjRfzMx6BFn5pg++0H69ocxZ7erwXeMVHHUVHSZt/BAn0nykc/l

n3m9sZM/Mu4DeKqC+U8dSOd12shfwhD9kC67F2xyZ2EN3/topon3Yvf6L0wo5tN5fyrO/iX/Jf8l/yX/

Jf8l/yX/e8l/69T/ixBNvWTbnT2JPPXj4jhzjA+YfK
27zW/1qEy59ZKCO6MTvR/6DkjIG8zcDl/qCIJTwWm4nieyF4Cr9jmW5Xjv1ybk/o4iswg0jK9OtgYkxd

vOpxKesYMAY8ABK9KRyeDP5M2qU+ZW6vHdBhNh3JjIMyXuOIlsou9nLWmS+e0POKi6mnMIcipBN85AEz

KK278rHv4bV3OLe5Ims8b+hSWJ4tLcJovLfLZFzgFR
X9XvLg7WoP0oPUeXw5/gZXTGXhN7a900TACMHA4FItWlsQO0GjLNM9im+uVr2Bkk7GP5bcqTUnLbm120

nuux0q0jL6l8Dc/kOpq0wVCJsz0xEAz68MC27J6zwsZ+mApMjPccrm3lz5yxaSufURNo6ANdHODxHzni

D4hKauk5Bm8Uu5o3SeThULwkiMBM0MAHK2pPS1erO7
LbzFLl53qxCyVGiwsDRCvt3P7FSvXh+wntpXcdZwP9gitjcdI+BHPwKWocmvc7rmq8CkQaldViRQQphb

nLDEcICYH42F048vV7gB2s8PiWGPXzCo/K+wLlYvjuUW0e8TNiWeg+ZSJARMXc0T+uJ0my71yeVQscif

XjHH1AZhMHmK0PiPnZj+zujS6Dl1ppnAXsl2BJ9EfA
qtomAzjyM4VevPMaBZp23vJRAt9Hx8YpmZ6u1GkHVKtl5vqhvpVgRVOozqrWkBatwKoBGO0pMsopHF0/

AQcCU7msEk3EI49x6BvamOngnCJKP6KQq0r9vyTWafebYJzWa0Vqg2C1mHoYWHHKQBdU1OgBtchPrhVf

4bBNuyBlyDN+6FYEabmgoYbbhTC6NWdzBkFTCBNzDh
wS95kfmquLSSvMBY0D1zUmdZ94mSF1CvPfI5WwFpoLZbt/YXP6b2ca4iNFrCd/sMMQZihyndHFD/9mtZ

dmT5qcZFi1eV5EKwUHAja2yUJY0egu16s6tEkjFpyRsowzJ4peWVslQesNnK1T82N+K7cKC0fPaTofAw

UJW3cdG2036hQBOJIx1a8thXh8iuYXrgWpm9z6SoX5
ZqLSOZ9h1Fn1471V9+CESQm3ZauRT+zP4ufqsn7Kh4uA9bUlkiRQZ4eZPr9Ni0x9aaR+vkfKj01YL51V

nz+3pI36GFgv2GYAPS3b0VCnSiJsgvHghK2YkXzfppt/kqpagvm6Wz2Jhn876CTSPAq2cx4Xd1t44XWD

cfBorZ/jCvvtv//CzVYz7jVlhUDGxazSSk9bfdZ2yR
kUoi5HjlxII3dOi3cJjddPPgcct8AMkTp+UUBFfsS/1vim0BPp9u/Gj/s2cW18BO2g7UhycdgQtlOHzD

5aQpHFwtzFhdF1lrNfuP1f9DGjmz35wblg5mMhIwoRWPKUa9+1OVzsgocVr+BivcLJrwuXy7oj3zPBaN

Wb17u/x3iV0IgBClp2uAzWx2eeSiiNbBwycrXqFxy9
aBGnTTBz2oESiAI2UyIwGusswsNILQmGcPEHjgaRigIs3iA5auYmTsYG1+s33pKmwWCZLTK6IL8rrljl

Zpjhj7Xo1J4o07afW1YtT+SS3i5bGob0jXntZdabccojHg2AJsjeObp5P1JWXTj+y1m+dF4WLNiJ47J2

3MVAzckgcHrdnndf6XNLFAlKSe0IPeZZFvSvPDU9/G
hzXiIcH/xoHgdez4wskGRse4x0uwU3SPnq051h8Xfa8ZceIDaS66/Zqvr7jomSp0s4KAhTiIyD5sbRrj

0onZe8JPQX+ak+LosAHZ6ejnvEDQJy3vo4XgNxIEE97gqs+HYL6pQhqH4J03h5zXVmZ7ouWC5EsqQQUe

/RsqeMmA3h4xLtBVb8n2dKKho8lttZxfja17jxZl+G
iDwEsA0QfBQQd8uE91zFzIyew5bw0GQsiVgxrihHO5AEohJoSH6NAOF3OOVGe/xmtYvb0isuTVn+ZyhN

vZkUBI0vRXxxYrvW2Cv53T27drTLPURUHpwMOOzExJHDDqwiYVVApmEZu+8G2/XKbgzXge6U8PfbEBDz

Iaeb2gbq+Cv5+ZhjpvhYiEzbmHJ1C9njj5AkCxOhJ1
vXeESwd303i6lfy9J8dZuH0U8h1X51gKEVqcvYZcifJTcUI35HUgG1bDpWYPfyf0kYLteB6aa1R7SrT5

Pv7aPPbTw1k7DycSmQbHINfal8qZ8cCPNto1a9NfgGFkZkIqlU3hxmjT5oseOuJ1DuwznqlvYh81FuHn

J1OoDKZryAwB2HWO4Gxcshrzb9suR/0boRKuBYMsvl
vZdcQUBZoVDXhqQaRTAEZtkhMQdhRphpEACNiJ2Q6S3Ax/cBKdpL2aSEKRsOccFPHIq51vv3M6um4nQX

ZAaO19y+1UdyeYklKWCw693XBkBstEyfmKF4WAkzQN4u/HJBsnjIlLptFR4vA802Zr4r3eGMh8QwsIwV

o8XSZ8cNEicGFqvSBLzOrNBPh8fFdyZhqHvS43DJd8
cf28hPgzMcktti+jdasVVbz5socS3JJ1LyYUK2M5gBHGDG0JVpg1faAhJp4fN5V3h45tWnA+IL1a2BRE

zLefPM51hkoUU56GHCyW2k++f99YuB3Ln3sj8f4NYb/HmB06iR+HYysoBVjbZI1MO2Hc98xpI2so8Ykt

Xsesp9y/uxhP7WqdXFEdxU8/F+ubnZD1cJg54HZRil
FKdY/xh4/Y085t0egKodX9agoHZ65qyj6C4pZ88nzC5uEnvywmRt899RwQrVJTf2aIC3ywT8WoIJCsR9

Rd/fB9XqOQFA19WanOqXEwrziIOQMdVklGVBrbc+e/+MeKFe2/QuN4/jaNTH7KWiYomAlOIPD4/9WI2x

oXg3qubItKhnzG7F7hqkYuR5aeQqfyvqeGxZOQ1RO0
Ao/i+9h3M28A6MlokJ55u5c61XwbDGBanu4fBJmeKVYCQF9RROwZekxyFjG1U5Xafpv6hMR+eggro4ii

nzbx2yHZvG6ZfMAraEu5C4taPsFDLZa6SrC7yYbVdhLCTlyLdHEufpzjO1UOOE7lTuvOSiP79kwlRpXJ

sDGMZvKElfL/fjLiRepz1kv63SXF4t4+kJS1ejw2vI
+geIDkacgJVxP3khatkiHh1wydo9+Og00zIq3YYs4ABpSu2c2iLBeUJZ1ShStF6kvC0nhq4zjxt

eKP9xivoZUwpwbqsI/tpL+Alm77p0jznX8N3oTJBXdU4PWo3VNipu61LlcjLVmyT+Bf/n3JLEP2Jf5Lf

FJUi95qmHMdgcI/Hi2+TxJNKOWoPdQqg/Pi1l5ILiW
5sphcbQY69DdEBVh3p1qBPUgX7DHFUOVETIBEUtCZOp15hLtjT8mzuVLGGcwNqColGf5Nfi5M6ZSJ+Rv

NTGu4ifju1eJi1SC3+iAAoHlRk1MMLPy7N4iA1keV6j5rVK7dceJUPng/n2xODwDy691QMFmxXijqdst

JhyEiUUa6A4FVAFBdigM6kLu4idscSItjHDw/L4AXl
/byy+RhBlJvJ2tvG+h9VnYt3ZJpgITz7ibZ4jg2mN3E/LU9Whe20ApC0igrZ8qhRAWKe6BO+k3UcRtWj

B8h90PIgc8MJtSTdl67sL9MQVJDs/Hmk+7jV3Meq6YYphaYvjEHTWCwC2qjfETUcxY3YAC+qT7Bn4Xd8

nwg+1cFCZylKZ6roFT4nHddHUBI7RsfCaAh7OiuDfx
QdGnLPRQoCp7IKcoBKbtwZC57IGa2l1rIcYQzZ4UfdWV67o3BXSWBe480JI97nebG3bCHKRhZ/1RLXtY

xMTOzwXYV4R+xf97+BBMU2lXlliocBKMywhN8VpwyVG1w1KL9cEyewGUPxU6so6w3OOVx90mLbF1Rht0

zwu012x6UM3eOpN1VB9X+gMKIaP6do7Q9ju7FVe5n3
NcImYa336/zQ09Tt3LckqYUgW4Gtn64vHD8OKulafGqFqvq4h6URAl23OWxsZd2dE9iI2TO0gk13eixC

NAM7x/eI9jUup+Mynor+CAzE1a1robxDlpA1O6PHMqi2l60JYwRmb7PI8L1JNgWTcgQxtctn8SiPb1yGIP

xov5GPyHr1NwkpncHcE0m6iPrxh8Y8Akzvo8ct/otY
iHNDHGJcXgyKSKFVj8GsEbJoq8jnJ+k9s19H/9ExixS1cAPEsUtsQoVXCESlujpsQwvD8uONenKJMWz9

CwuRNXrWDUSXyGE6guZ/rUjTSplmMBtWfznnSZFcWhqAKqoA3d4ab6N9Vzo9ApO7zsWoggJxXLe++Qr3

4bdaCM/VR2h+dsEvKQhpIzCyVTW1PvJ9zzuVzLvtRY
lo9/+PgOaOubW5lvtAWuNjjgUV3VKYiBXr4tvBRRJBN1BjzA4mnukfn0C7sUPGt7wW1KIk8Ggct3J1wH

IF9H3u5jHJua0/p0TAOFn2AW18sR6P+lOaaDvlPt3Ffi8SMAHTvN6/0oDBiSNdCDTcS3PY1Vlq10Gg6u

8sk8eHmufjRw6igVnI1DVg1dHHC4k7RiAIbEkUKz2L
51aQuq/OVLOrvfy5pYtioQfXl74d62qc2i2JNKF6Xx4mEErL7JJNhtj7bj6d7zYkge64gFoHmuMrMGhr

LLovBUMs4HnVtvoLW8od+a4EHoP9mZnxCm+9wJ1AbvOjh0NWbBywZEfMOkIE95fc9dHU/VK/N2SH73Gp

m3nOjuXLKFSBFN/PbAetLd3TT6I1E7vUWm0Zm3ZtNo
mkrZJurGoCVGc+CJBtC0lSnDpH/v+5eT6okxpvKtZIEfkvL+2IKfB887xBnK+GuY2hdZxeKNwidRckr+

Op8tiHpJgzYpR/tYhb+p6fpwzIevK9yts19HM08EAow0R1aWXAwoJ1Q9pfaYj8LsKZbtQh9j3Txsf6mi

H1UDVTdTmbfbJu8PrpJQDGXjXpbSTvX3UEj/46xtpQ
IFSCXCbqlLEjD3vawqq0zh+VyR2N0Z5X1CqpIcm+fS51njJGwaFQBGt1zPBar7B/8ZtanywPhlebvT+1

Q4VWPW4vxBTz/r6yISh3elLpE5S7EsUSxRxumJ1gmfuQ1nKwq+zAJjqdbD0QKz8IYvpbnwm/783upESv

K6nLfKYSbLtVW87WmAANYFVr1s/KmD+K4wZLJKWi7Y
FaNKjocp/a5+Tmq3KEWgnRRUw3ukryAChLQbC6b2T/1OB0g6AjjMDs94alVvLMwmPlYqeERfgwS4Gh5t

Y+oD0T4u4yvBS3yknTvyFMj+rzX5VGIeZpz51mV4JL8cDJkLiXSNcolNAk8t7RCb4K0sWxdLARrlKt21

3Pb4dErnVXq23YEM9FFccukhDqwOsH3BVRO/uE8sCf
lB0oAhgNTJ2+4EvjZg89D4DILe9eps8RXEtSc9Ual/6ZsafGqpJYisEuCob9vmR/nIIrKYKT86ZFYSHn

QeKCmvnzaS7VyfedHR/Y6VscxJ8LECWoK8TXrNFMiUSc/qmCxT744IgIwXz+q572CZki5qo4vQpTab3O

uAYMdcseZJwl2AqFiQOO8YifjfroS4UauVKGpyGhkh
Al39vzblFSOf70HvI1/T0Od7qyrz9+1tjbSutP1dg6HxeCSzp1XiCbgkz+/7Gz5X9W/TKTwl07iONK+k

ssZnKbZ4yzXB24tp4zDFZDL4PUmYgCErCLN/3aLaG2Twu7rnwuBMsHKyCcM865N5fjGE6tYQ86jQl1AZ

qYivWxJ5rWkbn+VIbr5L6GKTw168mFzhxiYYuwa/Vh
2z453PPn/2diE9KKzezaUTShF+B8rajT7AQEP3xx1qRQQZf2Sj5eiIDMdsutZtU9b+aJMgQBB1IFgYYm

sGuiqYynNV3J2sbHEuPWFeuOGlgJVdiUrZelpTqFPBu+BLtcc/o1VMIAPlguTQwZw6jSQHB/Pcd7gGXN

mU6b2/eB2DMYXdC3weKKNiC5OPGui2Tke5FrXXeBUI
XzVYKj7wDP7bt4hvLz9RoUAo8UEzjn2XiiISqD97N0NL+ktGJzSrSvwY/mU+/ccBJGwaFdB8gvco63JU

LjRU8bfnIOb7fRbhfh/ss34ydf3rSMtyV1xPMIkw5jZQk4AmAo4RMx/UwPP8RIq5FefPUSqjVdpBd5an

FbyFxsbmpiwBaNbLbk5979oPsTc2ckraTo8bN8a3GF
bsfSfm9NSjcWIuAeb7sOTMv4fnztYZ0xy/ye/i2p/7B2Yyxd25X2hlTvNa47matj1RsOvgp4SWIoHx1G

ZbrLKjErgfXoJo8QqTiwBXbOz8qNgZ+UCCxxaMUPeJpupnPgrHAHg0C7OWflny98cRna4ON43313e5aZ

sHXcWTGSSNlgHNgKbxjle80A0XBT+tedPdfAluNsnP
ZKhg6tapFZcMKoqL51GkLmVinNjeT+l4uEhxY8TXperxpUbDeLuGwaklbvKjuymYYt96/FN+VMK88l7Y

2b002MnQBWBV3NibLL64IC9i6uda0+WyTIC9wwZts6BDBZk51JX62a8Z5NG1EqRVXOv59TCPU7LTalAM

TbAnfP1MPUmnrYQnPZuQd7+rTfyCQC9+2bOx1Ik/P9
uZssXdGIPRHWV7FGD6AyxMMb03/KmvBs3Q4q4qK4EtCXYnQe+9QiX0rzgayjh95x2FjG90TbVbzLdF1I

t3L4baNou3Lu3aLL/CFDjv2TTQSYkvWV7QfOV/+ejWfmnSYvKnF0iX64m3iiXLyx/fxjJRBesmU20KjO

kaZ3F4GT7H+ChSrU54tirxmzesVFmH+T4EyJFMSgHl
lvEJMZrsrEO6Gk7IQAj1b5ZYafgEZGOzD2BcMg2uDX6kqSf+FUhFVfDj3bkLCHVbvrkkDdZ0su1CNnLk

YSuIXc2uDCdK1EQLFUYgZ+Epk5HkRcv9hTa/UGdFcsgBHASStxhojTZejVgg4eroMIkWkeqspbxnLR7+

3Q3Bvfog222MXDw/cqP1Hr6rjC1ZD/kJoXiSifitXS
BKNLb+K5VoYDcorjNTKqYD01pXTtTZv9a0WNkDxA8DfWfo19/HoB1iwfDTfypNiFPYKtHXf6OGxQbGeA

5HZFkyj2KRIzx0N3eZzafHZd+qq1bP6Y4T5AiWAwMdLe1taIWah5quI3wc24cX6Q0r6o6gujpmYAEzTK

hj0H4wCmYogWh4KG2mo1HrXMkrp3H14uFJ3VGmCfZr
3D8w1OTtAFNB+9dlM6Uj/OL8rW/DlrkbtW0tw0K5NDPr7KtLUOdfjwPSQaC+rUJJ05JDeADQ76arwVEb

E1iyaRnpZuyglRVL3VIPZJ7/zdoNNCn95z7eWPwsN1wGeITQVioOxMvAMPLovYwG2gVn+gfXLIcXZavf

06nidN6T0IPb2T5Cuvi3ry5u4irREsJTLNJnGfTKit
WPCD/TfD78upvmqZbw5lrct+sapUzngdSRP5U+dYQhCggSgF/ROvqAb43TXhgKnGNFzWhgBxQFmXQyEo

6Yt5veJBNAEHsjC3OF8357DK+w8CSY5QvoF/xvhbCK5eoZrI/Se97FMmPC0w0L3QQBtzQ1NW3YcrLrfB

jNHJLIol7Nr+Fs0D8a4Hwaq1mzZBD1K/KJY5hNVUxm
iKFOnBqReHfuIM1+gX+SPJt9vnbunrEK61nRB7PA4TrTii98m59Kvc8hO9DJ9iLfy8+GYF5BSSB737NB

wp1yRtOhj2LPjqzJHloQefEmXc9zgHAH8MUv/7JLgyQkD+KETVUUhvYjWyBZ1wj7kWSXgmuONs4cJ3/z

2YmjouSfJvk2hMODQznn0ilzMBfsn9YtY2o87riT+T
JP5S89FyV/zVehyqQ9vRSQyDFKxRJbaDvLDILxbMa6mvtcMYfUeRiMIezo+yZbA5jklqJcZptku7hLHc

d+flVP0m6LLM8b+3nBN2gTgcUPvd/j0jaHNYks1U/Kogm+3phWzdQC88u0VxWeiufyelqz0i3mpIQrth

jrYk4V9dtuVnjDKbDBodPSDgimNJ3hyxGNtL2FCJZ1
gjKfNIn+QRdH8npvwXaFHUO43478c15d1Syvb698MSSv7kyv7p5d3FSRcXoMj9fproQFhrZ7plyaYpXa

vi12V+znJ/FCLGf0jlxSjnwoDIM2ET7YWxUcQFKsEknII3RskqpW3w5ua3Xs3yzrQYesNAByA/oAq7Kk

Q+IUnczIC7w8Vec0vmiL9Z8v6l1Bhht2fqWTzaCFaX
LAUPbfQEfOomZxcmpzWLvZvhVGeMSuWADFYXboe03GRRpUPYBGNRCvPbxc6h/9d83h2ky+bu2+P65fXd

V3VZ/zgGPyBh729erxamx+yBBtbN1T78gLVAsZwA8vzQw6ZPt8xtgYPqQQ12OAYi+K/jd08dHg5u7Ilg

/TyiGCHGALMxVZQk4dy8vUYv8yb28YXrkRXUqX3Tql
nK21Y8T2Ih4XcxBp+0wuT70eOY7OmWeYQ4BuJ0br/3TXAvhiv/ryu6yUotTY0z3oFEtdstKb4mejKYO3

T3UvLCDryBG/Dge921PuNaHnvQATigR/yh6Yxkb6xy4FpBFdrBTtWUvGscchHAauA4OgOqUXWNGZjcX/

OGJ9Nb10cYIlXlAcIEwvSNFZIgoToATh4yg4bYOuo/
zoUh99yTaKMN1et3ammghGAwXGs4jg5kKdFZLoLFyUK/qBO9zGYZlXxEdviiEhUBd+aQhgLiCtE5fKrl

5b+QWgT/XIKl9znA7b+cATLEYTSJOO7USn2Cjnn5bcejEIZQFP95vEkSifRkrkYTOs+OLZipj8X7uJDn

ySRxReAMBLKFx3EA6rBjq5bB3O7mR9w6pbWnJcSt/a
wFzOrYW8fjfW8CpOq8ih9hC22uAJQa2bsTRgtXu/iHGsD6wXKuz03F8IoEGgAtlLHlbLPf+pOPJ7LUf9

8zbsRgPd5C2qBda8HMnRU1IpGu2OBPMTBLb5p+ldZBMcgy2h+4GV41fd1B4EEMQvwXDkZRU+Sgpx8DsZ

ftQhNKmF2WUWnx8VvpSLCOAiqk49WQRQ3iPYazKLOQ
pSDXFaD7XgOdDY45i/UfsvdO3DDMK3HBT4FTonnMmLRHEvfM9tzTuozCEfae7WijaCsTTSScBJzhA/SZ

cyzwDd//MAMuk0Ph3vjzNhhiuSyyAATau3/RzSAsS6eGHrXqKG3QYhFi8FmEPEwIEncy8DW7F/aH2Vsw

NV6mFvo3BVFlRu88xzWcMt/0ll8Www3j38ryoBb1C4
zspOPTmkUmIT+sr/03i70HHU6D9kaJ6OixSI7/IKw3zJ4jScHk+pZ7OyKRbF96ZpuOKuo12hkhJdAQA1

PF79aev3VyTXq0JDX+ktRo1dwpPlrxOmxw41diZZWB9Dgp2EiIg+qtp+5zq/carlos+vGu3JfGINgL1aMdQ

sAu2M1TYgR2oVDyJpbcTDGYVnbcu4nICVZhrXumM0h
zprflrY9Mzji3z3Lg8+mmtgkEdDQjxizWu9XOsCGicIH36tQeJ57iz93hDf1cyVXUQH7JAL/myAHqnHH

HNY/9aP9Kxh24o+KM41ePFFSoSlUAGK+o902/mILiqP8AmfCONj21YZEF927vl+uis63wyOlfIZ9m+Np

A0z+rC4iwuHmteYzLyf2GM/vuo79yUN7XE5Dpipuro
5nRF099Ld5apzv56fH+xmy5Muz2JGhrhgbS7bQ/cdrCpWwu1QB1X1pOzuMiZ4xkX57Is4JiNFMylFQgD

jx6fH5AEBzYuA672P5DIxkJ/WEiyRLKcn9XdeOCWIvXI+53t+BhfHFqM0N1iaNkla3NQv6oGxPOxpXf/

71uFqyx0uhQRQTmW1TFAy1begFs6B1ahkIeCa9KTXQ
LlK7E6zhcVk2us3mb7jYrZwRZIacExf9K1CMTn2h/WMG67KsQJKIWorddWxj+apBQ0jkesT3pD2gY7Px

/9FzyaRrc/5Oj/YGZnlXueC8csIpQJlPTaKg+96SGGpcS39mhHRW8a9nril3lv/sw6jJO6+g6BW0Gj1q

4U8f0edJJYfvWcpKyIfWe0TbULpM+UAq8ff3PxB81V
2VaJnRRjwCtQUMII2h1impy7ayHCLBDvcyiUcc9wgwlGN8D2vwhmDFUggKafl5Oja2iyBLruHyflxF5U

0zZOpBS1jRj+Le9PxSg/jCEalKMcS9G38MHO1mjpvt6rxtgH2hNW9WiP+LH7VSAEfo0bmdfADjdK/Fos

rtwvSO1FM2vfzhU9FI2e5AJHoXFtxJRkMTFAiILBfD
GXzF2kastYjB8un3n00EdkMvT7B3O6ee7ABjEMhA0d9S4uyr5jVIwxu9C1PZp/yKb7+wNwuIscsQC2oD

2Q9dzBdOvebTW879KlZ+A8Id1pckS5JcEbY9x+n7FmAGvXKk7b9QZ5bLjQdySHJipjfJZHJ2hMGDsceV

8mm24/e3+o+9qrReH7KKKwND9DoL18qWYOPH7L0v9m
be94ww4p4gTZ6RMrOlND5NVQUKFif2SSJSqlXKEldwu5c8xjafeiAg8lUhRk+5QmsJnQRxWkJj3J2Pjx

F2hni7vGV+O78rSOPTA7lCqy5euxayO9FXWM6/uT3f6z2I81izWsfyiO2T3IdwcD6X7CJgF0z5/NsKT4

s9HZQRzkKRK7XVOA/ZZc49x/YrFDqnC/N5XBopbDZl
ItMuKBimRwQd9HWsjMONNMr8cRCBaWGDPC6ht8PLdPeaxRTE4lzFbOZ3qpPbazCyYPGXYBYWGt4KigNi

/+GoptSAzpiE/34aRvHXbfeJCDtNPhEcEAM589UeGu80ubqtwuP1+UCK4i5dk2mLbXQem5mNriFygCCW

94USmt26nbODi1A0QzQVlD4Oqt1r9co394zK7B+Shannan
Tz00jSCnmaJcuEG1Befel/pmKOceIE+95a8oESgNYZM3AQZvo88SAtjkejCMFl2oeCmfnYE8+KV004Lk

hc/5B2/H338QItG/A28qCoVhIBLY3ZIUG21fVgyfyIvpDQr7IfyQYTNHcyM4ifVH7B05Nw/7IK4wlu7B

PKarNPM//+ouKC5B47NSZ2GZI4dNHUjrwrKbgPbnM1
Be8jR8P8IsJ2Fsc3I4XO5mv5RfwquvlpF1Z3fELbhF3DJCX2+A2UdGIcPSOfOjQid5rl/kwQZdm2Nxgo

l4GKpSkk2/gp4nO72XY9Cze9PEO3cUknz8V0Sow7wkeTUQ87GD+Myzgw7pYtr/UwkRo0bknu87nZAkCZ

v35eExxerCm2TW/jMORf0ULm1WGCwtftAzCd53xu12
Wm0ytRIt1sl5uHrnQhAZFJSzuQNP7oRxcuF2r9uVpiYeEjz+8d4s4tX6hXsrHHQ/+UGD3dI2hgomMLON

Q8RXxwOM0YVfMZtLyoxbf6pfdmRn/Pw59CCRAToFPESkNL4IlmjZffD9Eq1gz9FXlJo8v/OwfpcNMl7T

ZUI+JNVv/UN66k0EOpXHkD/hRMTl4gwJ+aHMkwc2nO
6gj/HbqPRm+l/X86vzwlXZ3N9HEZo0haJeOgIi4hMHBtMXmMVZyYlqksjVC0IDiHcWVwttSNpAt5fGyd

tAksfI0NwjkBJFPeNDiQfVxU01PDxkQNjGLaRv/gsJaaDb2W7HJphkR8zQFaZAyq3i58agvm31q+UjF8

aIRZ+iwJCmJNKTe0kgGLSbV9Q6CAT7UI5oK1E+SyPT
pcjshzVpzhd5jRSYB2AMdg8VbuMYF/o5tSlcIW1+A9565z7yJYYvl/TtSrkf0f4l9Zm39MOkrX+7qBLv

493L+Xl8261WQo8AFWxbOynIM+adb/osWbSp96vJMus5Vb+wLmFTxUEGrDRKVb7M1e6JbnN00VWA63be

xs+M0MIT1m3/1qkdOiUajYRZlonH8qlhtf+Eano0nR
iapBHDDfOSaZqbncpI1A/aXB7aexJGClPFyhn2mwI0TG32DyP/r0/dZTJK9C2SIQJenwSdX7HEibn7D3

HMwG9chV7LvPVFA5TI2k3gI7yf2wGB/zgmsI3TqZShibEDfKfTybnHKbWogcQSL/9jvkG0iOtPJ8FWui

89UZGhpZZ2a4do0fpoufhPgAouWQyj7lojQxwXGi02
ohGD0/EEWYD2De7J17KyLVSsVVqWEEBRa29qfViWGwiTg+35otJusvQDerdovKFdW+b3HGMcEZomy3XG

L91wVPdIdbsdiLY+OcZdciU3jPOfDI/nlMnTjGJbxa7jS6V6qkYZfmMMiO9ZfftDQtkP1J6Xj0psBtJA

k0pMlbZ7XFdq/mDLQKa0jLjnBz2NJVxWvtah4rSAg8
NDJC/3Ek2Sr7sJnw37cL/DKMdGiL/RwXbuQU33HTfX4i81Gn6139pisIiSWb5OyK0b7J06eZ2VqUT2AI

cCWTBR4Q/eOShKQRLG+qEfzodh9Ri9VD2GV6c8pUFwtU8cF/8fa37cSL+OOyv6o3+/L0kuRVhh4anZNl

78bmvBwuGy+iGThxAWAEl3tlwCch9tSSiPI0ZjR5sp
TuDr97l9ddQ4XSsSbLHKwnAPq4EOjHcjsFI6wVyyPrlEvnPSEFA63+Lyrge49H1h/zsHxZPpmTUjf6ah

TVMdtCjhf5m30knRylB2crj3saLDZoi5tGQ8VzXeqOacBCRb1r1O/5cPmOi6vfJD4eV90CMZuIwd4Y3W

2z6Gtl29085I3FCy6q5dsilLvADXJ6wsYeDsMyid9V
GzNNlAXegTtHlIXMKhOgfOMhQndia3fwOysWc3QsbdPPluM3W4XF4i8eb9oLHAR/o0ke6Me4rAh2Bg2A

uhTGuWaXcjTSiAmhpxT5Sdsu6Dg3+f0yfacq6vhCiWBTV4r7uv4E/mkLofE8pbnhES17M0tJ2n29UE9P

JnxcAQZ1rqhweo116oeCYyb7x1UN8SEm+lhjoq9YOs
1sYeJlQwxPSx1+kdx9UZjO/RkabgVyFI2Sb7ITFAZt5QK40NHgTOF1aHqBQ3d5d9E+ir+TrFxW5fLEnp

iI/w6LfRZ6HQz+bCM2D+JYKZvqzF1OclAjr6N1xzq8TXRbh921+I0H7o2o2B6+N3T4JrcHXSTC8AQpFC

6NoSPCUWlX9RpI/MsHG2kykuYaVT1lVkC08uKjgL14
o3M7xlnpuFdxnt+E6bzIv1ebqanaH338fV93pvyTuJFziYLk3WF8BwC8aCBkXFcN7Y0he67ray71HPTu

mhbDOxCG5CG52etTrbgtJAlqKFUvHsh2VVKuk+E4QSB2x/lV77iN2v/vJYa+8YkgtSl582DAdXwARryu

M5Wt1V/zkdd+IWfEfhBtz+v++LEPx/TW0+uigOR72d
SUmnUW62sBYSPkf489sVHzzeiJ4EzmAKbkg+OHeaw9MzgMjwH2SLHhjWm9jSzN3FsC+cKO+jzmIHKfqv

rOAyK65KUPGexjLn4sRfRhcgE1Fxiq9/h7Yunmy4Em5mr5ySGNjD0dkDBwCg+yls58N/tY7PEn98dDFQ

PcFhLCaA0PrMuIg72YG8EaeJzkmitrNkLAzEE/dFC8
CVnwhl5NISx6mSgu2PxSc8TQMaeRudLot6pdxvZPuOgr6o0NsTcK1JnY1KP9Y//08ZFDFsokfsKhKrz9

1vfd8WMZdwnzCvXJCDQ2WfL/M/FNLpAP2HVnK0P6dhD/qnlJq2pOdMZLrXHGE922U1qBlEn8nlS8BDy7

vjW33cOC6OhKwNkq9KFsDvcaCiKj6SVLNxUyHPb0j9
IVGRJBHgMwtMWCQxIczTDLfkib8mGEPnfRrol916XSCCEAzRuEorDZ6EGmc6wSQKo/aOdLHhWkbRNgp1

+fpBwlpPsHZKsEBYwnzpqUudOwNxJeYPqWyZsTVl3hX5CNyI9pdJVsCGV7T9++aUkh5Y5sQ89N74aUFl

jR6fiBxXBckXxgD4HoX1Ft/cFW7FncjTOcbxG1tEAS
efBIhnz6633ys8CkFlg0A6tanOGHGbD2gZZ3p/8EGWqlUs6fNZReahGqS/OL2Vpazlk46ys8hfDykfBU

X51Uj/E6oqUPIu/3zg1anVg1ejW72cQ8dg84jSvEbaiC/m82Lbjb5mlSwBW20EpwII3tOsVbiKlyR33F

MXvcaXeXaKa/nP4L2vtuzc9l/dzlUzjTVYbcPwo9gR
hS3o4AIWQojGx1fBTV5QL+exHvTdmOMZciqb2q15xmxXGB/suKk4CVqipe5awRMCkd9Eb8fY1Zd4yifA

o27pgEKMwJGpdJaVu46j1g78wcJP9/Eor6xr3bReT0BIAvpkDD3gGfZbNCZGyVZV0RC84ZrBaJcC1bpx

y/zL2X3IgYPjHGlBr0H89M1uTRI+lvEB5TCsxgv2i4
zqq5oH2TX9Q15yolzjEt4L6fw5SmlDPsmODh0q8KGSQUuURcKw0VKvyMi0nFm8LonSonveZ0EPWghINA

EQZ26M0pS6TpakKvdLb/9YE4KV/Vai3kf5vy9rvcBII55xAuxc3hfiUgHNdlPOEV9D9Hz6oFr+TUiCU+

4VV7Pg1jFbVX3Vb17dOrZ/pBQd5Fn/fpDfphM/jmX5
TDYU+zvWg12FbaRsDNMgcEJq6I5hQrjr5i3/fmgb4VeP7q5lqAazlTnO9+0G9H4VVVQp5xnLge74nXr4

I7s1jOF3K75NicBTkCuclNm+rcFf6shLRvfrzIxrfMfSG9PZd3crJAUph7gv+FiiafWxZA+fQ0MDFjLY

GOwXb9NDXuiPYZsC1D54z+xL1sN4MbGAVjUVUZr/TT
0hgQy68azcy4fYz7r1SmF5SieSzldcPSxhRysaSgQLEX3gidOykf8TFZ9ijW6yA/Bvtu2BhOc3egqpHM

WeBZYKzp8gAMYqN7J5guH6dIxGo27ZqwpspgyLVutrRTE5H99Ck+H5x9D2ezvEX3DEkhWnEoZwnoYI+q

IyJATVq5F/oujSKbqFWHWQzCGJXztvPq2arhNajHaI
mv2IKuav27XO6GzPJwLpLwZAyNtolwGkb1XeVgfk5iDBSobbOy0YLdKiyPuk7C88ISibGBlrbDmOWyYC

JRkERGzQCSuQoCwwOlXNLmqsBaoGmYqz5mv2U5rkPtghksaA13cqttkREC8c7Y7D2Eh30iDLBL635n/V

qzb6b5PUKLk56HRIDKA017RuT9FRZWfCy9wlqfYbU2
sq6b95QFyeOnlltGmxpFhMpGAlOdRiSp51Et6rR2lcpcuIw9V/Kg4T2VK4q9O0TPpm/h1xicekOAmw3y

qsPJ2AgcjxmNb1G7zuDgaw72puml5fWWjlEiFJfIZ4BUTPSvScv5O/wzFFOvkMnibuM9dE4a5Smltbjx

Ov46cBOMyLDOtLR8i6nGJudD7PjzRocelFx6NI5zMw
COm4azn8HWerrUU8cCbqWKWMEMBZauIDHPMWAgsTEWnjs5dX/ERDhLDdlmIULkLqca8qQdTBXfoj1Rff

ocOXr5QsmYei+NInfFh+sbu+TGuyQpMiEe+jn64YzpCH/EDWARD/QL1yGixMtU2fy4fmEAwgLAYXtPO0ni7h

XDdzdKY8GQS/0keV3gwkPqKna508wBjqW1Ql/LFEOv
ZTbUQpO8Z6+XifEQyiHgqfOn8lmJ4oqMj46jey6pAyKe0ZD9FfMD3yOdYAUKFY2i3MzFh06TydTF0Dnj

Ignr//IPbMFmT2Tm2DfSXxrJfisw0GHVfLb9UwzuIOZ8aQBev/0QHLMGVIgVtTuhkob1iMOIMJisg8Eo

qVwlAhQI3SZHWoemYX1P91v/Z7q5auZM6RCt4o7czf
KN4ZZiuwnKctp+tTAR6/o1+eYOoRDp6imh5cR4yXVT0CQwmgVWVWwqbWZOp2CShcNn8gpIv2at7EfUOY

fGkxD/0kB5zLLZAB0NZ1RLQKHdXuC5JvXfD7Pu6KtxIZ8lRB+rz9z/NMKUBbSxuVApBATnOB1Pw8HH1t

NvSIPCAB958aaohAV/sJyZkkT+twfN5D2+ZRieX98N
i6jpoKgGcAAmj1lHpzugJosdmqg6RbTWDJMSACSQeU0A517IL0iIQEmuANW7oJ+mY7JUXELwOVgyJ71m

CAXv+oyqKiOuJfLYlC/PNRgkN+Vske+0imQ7qIVhL5BEnDNAkvoDp32uJ1OGPsQfmyGqi/jbswlOj/x5

U6Lynw4ryS6RFflYhbsEbvjwllmJYbpfcZe/4U+8Pr
qzwT3+k7xdNIZfdde4nUpnNsrnk/3X7DzrUGTqtUPJinATNVND8clklEDsjcluKuGse5Dre2dZCQsnPR

OETOBlx8o3vE6YHjyimphZ0Sm0PpTew/B+E2FV8kvuNGZUt9G4MCdlPO0ZW41z8Z0vyRe1wqMqxLvKlk

tBxP2vZhw9WlJi34ZmwVrqGCXKst7dugaI1I2gl0wR
qHKKZVbKLewjhX49X5yUCSJLVlOns+FAyue7MyBOWnqHA96/J7MJUc48MM0ObTZkj+Efz63U+x5xOQhi

+ts3qLsY5FgOhDlMgHWY3ZWK8SdJp6kaR2y1XcA7lXdFKfDj+30BzK97pRtz5sRoIuLwawCkC523n6Ua

miRaDQXPM5iYQUY5/1B84i8jup9ZvqEM/sotJ+Pl0D
QcnRIZ+O7SPzTYZ2BZy73iq7Ge+BD9+1PLkakFd5hwi/hJ6no+2Uv4J53y0BWcw8RouB5qAIqCEdvGc+

EXzocu55jlFWV61qt+WW61O/7VySj+TKsNZh8jgkW1A2dK2+gYVDFLEoLo46XwmTgpTo0WsqPym+4c+B

5nUtcRon/ANr7NJ59GJ6j6WyfJXoKX+jT+SodBW32Y
2lRjlGvURc20BOkdYcVtkqv8mdqagioygJe2mYK5q5JGvDAtnRcdqynRAStbYY4Zmkm/SsFo3QJF2skU

bOAW69DBw1jBJsCG+DxL7gCokK3lpupSfZA7Cec7NNRO+QpElY3EigGevLcfWhOb7HK/iYlm/1FsvhMj

6M2u5DyQJ1chEwZvkAB99+WxH94U+qhBzwEnxMiHUz
FPK37Mhn2RlrDk3JbjmT1368QYqL4cyoHDo7M5KfexF1vetC797e46k9MJa8YlzBUnD+LrL321rKOheq

mxaSI6z6zYnHPSM1XNykSEfO2xQhG02+iGwAFi2CtDgpnivU7xw38Yz1koe7YxiiFnKXyyyUHHxN9+Nb

B6qjLU3SQA+j1BFYOxO6aEp1dLqjHX7YIOdnIzinNA
l6zpHVNvsi67fiuybqWpm3MNRyZs0zoC/PFzJkrIB6UFrZTMEXNwrNG4n3g6Csd8Dkish3q5scN+TQzp

kLLRueUWTjq4O1n4jdJOavX6GN4XrVz5HZ0t06AdzUCjX4rpNhW5SeJRtmFAMIJPNA64LFrvpYWq/BIf

yOPXID6wgeC5C0bBpPq8tVlKKeX9f7DiXLxCp6qDFs
QRnGknnwilaUdWBe84V05f+JpiQ+4hW/qIIz/SFI4pjrZgbD/ZJRVYFkcv1hqE824ZYCcOC1rUta+1uE

9bSBmcipuAHubvkl4o3afbmFDYRtnQi8HYxJKqUTna6coDxEH+mvuCFl/UXnVO3N0Jg5Di3ifGEh6hqL

qn+Ixw0af/dbJCFbZwRz438BWIwUXQbHJv/sjBLsPC
lBjuC+eoTdSZvAGjIK6BONeRgtQ+xLwznUrJRLnq0cpRkQmnD4oHgl+Ld6lATSkVcgYSDNNyJog+ZqnS

dvcyummMhBJ2MlefCrHmDa50KmIT19gnbhuiwHGOSxsw74inyJrIRdnPgSV+xv9jRImSm00HQN4UgS2a

TGQLBwA8xldJxHk4w9eZ+qiT58JwbqVCK9gkfmnEg5
+p4CtB7D/IDDaKGG5JPSwzopApAe2OYn20JViQ/v7tDBOqkDUrxsC0yOYnqeQGgwC7hFFkCKn6EqwTRG

IeriXDMoiGFU8vUPdCJBHwzqlb3b3kc6pOjnkveodrBhKL0MNNaDd1nzoqJIhs08edRc9m9l2GnsjlZY

5oGzdOCYvF4NohEZKnBpt1S9k5T8x9z1W5s5w29S8t
Gp+PnvG8d6loZ+6l4iJV6opmV4hLXCgkSbNCrDArPTYr5EevbBfX4m8bwBjGXX7YGL2Wv+TE6mWl8aDL

CzqFoxsTGRrcZx5zVfLi8ibIp/14xI0Eni/PWsgiYP5CeWfytu6otvLlIXCtND67gfgnKcyj9ZVccjB7

QIj9fXRoD8R/nkQFzZPyWvIJl29RVYoKi5AeNNq73b
709+l0HoDfEyDZy4P91+kVNtkUtlxviw1NVcKWgcDb90qWaO0GsnIfmtnaFYIU5NZvV2IvaEwZJcn/Vm

O9hFKbNNgThUfLo2s/FCh120bcUL8f5E/V65ywuMtD7GSDVxIq0k6AivnuCyqE5yaviMNgw0IpZvFyP6

aJBFpNNs10xE0GRYpnevRlxK4nFdIw2H92Ou7lJ+gE
0mTgscoPT/HgDGsnBrMMQJJQuEBnRRRjLUGMk38Fshc7owRRaIJG3/L4b0tUoxkHVkbUn/1Elj2g1SyG

7AVhCjaI5ALvK5IMKPbjJ/kvHZo6EYg2VyAeXHTTxupRjwjGQJB9aFvppg0Rt+pZFfL4qRLSOpFshwfd

0sdcC4eliYDM+oEpW3RG9x9GDqwBkT5b74Fpdf1eTB
TW4fPquxoUkCvFce+sjKn7UW/qLMwx0rnbH3rd7ynEKkSU1pg/vVa6xARJ9ACAcDVGTG2khTg/kmjii0

GzyUWa9tk16WMywqQkgXa9Xk5PweZK/RHOYqGhcP+mFOL7jpoa2clfHBohAQ10MXoSEhcXL+oQ9BiHiE

N4e6tBkMbCRZdm+pLURmAi7N45cbq9zrCFP5MMT3sx
aq4W5BIZwSVlz/zKeTYxfjxC2y8TPDNzljwhJG+9S1MYFzyTtlyMrsGhN2NTYg1e3aKVd0GA6TmUv2NB

hnELjwSXFuutjPYKMy+QEja7FpE2LflI7rD3qeldJgo9xhl3zFQ/kmU18adeylTnyCLUK1b6MjCmMzbF

rVOnHxds7Qg1ygDdy7+JTe6TRXiqpL6zcRe2RJku0l
BTfTwCFaIlA01JQL86dXUtW6yN41Q99k9Tk9NR+tvBsX17XY6Ov8vQnITDEQOqETyGONeS8c0pik6VNa

lWy+xVoPNs64Cvh8P07jH20LldCF5czB8/zPSoNLf2PEGCM4nJRtpSzgZzDmIuR4NHk8lCGtqUhrXlY9

vAecURq5/zwW3lM5EKxgDDfNtl0QsoO74jnGE/7nIx
UlsuhaA+Y6DELO5M0Z9Y2qV/wqGWDbzkM0pL2IqAs2h6Qn5wCI+Ghj6UYA4v71CkSNmWQnNShAO/GJBe

oPLAoSkttXEpGyIOwrl5PYgAeg0A3zwQ1O82WanSJUjsWG5sSoapw+czO3j41ZxPh/XrbU6db9uZFlet

AEfbz+2kDUMZgCX86gGdzqBHLqE/FVrydCOBjNX+i+
gneqT7QOPINHqmp6S2DrJNspnp6LBMim9wD/XXp/Evl2gRL4WlIKRvkWSDVGrZioIWPsjVdGauxu3Q/9

y4yBc8Yh/YP0EiwLuQ3XDpxIxHC44/neOuuuFc4O1RLvAJQkzjAnZMQwxRJD+rloujEC8UlrRqKyTxFz

YaFy7wKSupLcqP7/dDemLArU0+UxtnRT2PgpkdFlcs
FW+ajOONJ0kNFCcCSWlOoFnI2TnE71TPvSxwCBSGdNrjf1bZLeZoLSjK52dXZblj/N6Ql+0DUSRKQI8t

EQhxykptzvXKU4srNtuO9c3GUerHIH189jwaodQx3OJQcc41T33fAuYcEggxIIfKuirejpSkPgt2C7go

qAE5O8YFQrVX2+NneLCCEECq38ov6boxO+r/aCQ25r
+VD8HA/SnBRJLDz3R9H00kDicmDKxWqUSfcXeJA0rsZDxResJslw5G5mkkNPCL6Uyzvu+jevUrUorAlL

G0Am0Jm6ZmMVOM1iX9psi5moV8WlQ99k4s7W0mCeYekjM1IIksRKaQfAEqjHJpNpJqxUHS+ZapQbMuKJ

nAQ3etJ0uyB0202m4y+rYPZMhg2aVoeYqGLoOPS2hr
s7xJG3SRlwbwMk5rOq6NCh5kLRuqeot+xyyA4bOZdYqtZsD605QA76uoGd5gW4kjltW7UUZ8+hl0ZrZ0

Dez1103rHOFa/EkkC1f8+GSYFL7sRr9aUgokay1M6jCL08SvxsfgPJxTHA1K78UX8Faa2kRvr1e/qhcn

+pRxlvpeL05FNO4eNNzbWh3erKNwDYlK1sKItoHF+P
IKOWrtHolW5ZR2gxfWWIHzAcB1S3h+qo1Yf9LiKxLynqx+otAeIDjLWqsBaKGJr+KifRTBjrilMh/hd/

3FRPJqY3UonZ5fveyXE1LSjzK+2NqzqCoiucLGZcZXbFgGnouKr2T4SXOY8kyiy/+ahsRfhjHznHt+mJ

LC66IU/w/A1npel+1+P3a7W8UG7GIvg7NzRKYPBpLP
ia77fTmXqNCs7HzhchEAmDh3rcIwpcPEUVX6KVSDNfw/j/oKyfneBYxPMjBc7B9LKbwLaH3DkxozSQZh

5zP5wPqMFL5H+Ygiaaow6erGp4tUpjVkT89JDfHhz/ye91K/XR7ViC+hC63ZvuTqE4f/NhNNwRx9rihj

6iPKtiLG7eZ/0TVLnpWr6DFzr+Y18gJg1WTuBDN6vs
K/rxzq1buogqFyFph0vzCAytm8nysLrrqnaF6jSM/y/z/KewTuyrITnt9tvJRmCSdIRDRM7AfHUox8T8

/CcB4WA0aLIb/s0MpjlmMDIqFoSe4zWloBjnObBvKFT/cPxvm/+d2MLNyZ8YlUx+oE2fJTugKufyHrX8

1gUbIZKnGN9D//xRhLeewi43GyMbPWv/W3CwcfT3p8
HidLAUZArbnA4JYcxQOpS5/sIWF+ogYMKUw25LVq0LWJJZMIRhAvUiwnH+fCyHyMSxB0e6coUZD6zKI+

AM325X21e7IW10y4IisFqjFuuVqHxzW4YJ6nFr2xVbFyrm+PLwiaygT68SZuFjfmOXFkov6FSnzNfyz2

GQ0rT+yJxAYel3t+dQ2J2nQbJr96Aus5Kj6COoOsek
mdZDveaPTbZDe3nMaACA2lwA72ZYosWWEUvN1JcSZGeJrZYzcJRJ5enq1MLwstjuMjeheUeIXtiJ7lnw

CqQ9owcQGpfb9vgiLrgkfkZqc/cPv+SVevEu7m1/3Abaqc2O9MbZhRdhGUnHEddXidVZeeafgcuH8SyL

956kbVlMbY5tdzC7POLHctLL7IUaA7qdxVqPIZuM/U
N6XHw/xk8fRWNpwHFQ5MXE7M8YzSba4FisZSReMD0ae9AuVrVl8F4KAQbYFy28W2BfWXF0CJY3IsdZ2e

R6rfW2n/gWS9KSzL8PGn5gKoB/MehY4xifssCcE7oraFDDp8rzNMbMK0QeF3AcDN7n78OdCpxXrGHdvN

bdjOA0vjKHNyfI4Bwf9Leo9sD5H+d4hvzuMycGTGi1
p2pUttjyzHibqS3bDGfBoTmONeJyozNmym56WmfM6MuXOVu6A6gX9KdU11Bi6EWVKY1N44r5XT+W8l34

aceT+Vfb/bdvj+f+5On5W0Q3tPeQwgnbM+vplRNT1azxO8pF0s7CNrZsHj0Kd7bWW8c8ITM5ZV96dJ/G

0Gghvn3lm6R0bI9MB8AHqU3SMD/JFmY0IiKi2qkktQ
beXpu20SCSSLXJAQkn3/oJ0UzvvdNF/yfbu/yhSqz0C/xnoC+tFliQ5gYN16m36pmtgN8M2ZuKf4Gnnt

fen2FtoWxZbbfC4NZmdV6CQfVf1sHhmailXF05gZ3Qxp2vDKe0aZzS6nMfWTNutqkdPNTPbfQUwSf/ez

iC6mzZwQrOw1nxerBzLMkoW5sR80FktZN22nrmqD8p
rl8nKsQ3eoKUTUz+kz9X2tJBEAXeNjzNnCLWQGOpHilN1C21EmUl3WBcEZj3poX5OG6WbgW22UvERV1h

UWWXog9VgJ3trfUUpd35jJV+LES1J/Ehjw/yC2XR9yoZ4Do6fwEOH7S0ghfMTab3H1XidJdy1zt3tl7h

i8bQDmOjVI2cJi6H8tjZbQN5zKF2XiLA4c9q89B70y
koybFdiLQA4RiCSZe1vxNT7KII5wfO+lr+4hGCO0M92V4FF/4DNTIlAgX5J6vOYUj5SsvL6qwFw0n9Lw

t15Pu9CssebSWPtBi3QRbbfG9+xqmuPhk9ZAvTpOHQn7g7aUyVye15coNryZeCjfiSmbG367vX9zKQsb

dbUhG0WlxmIL4HTS7bwNBXwozK945w413yPsNQI6eH
8dq9W0uUKN2dsm0cLMjWW/ErJg269yFfh6Ol9h7E4icwITlWezj5INzx5OmgheTCArBHyCoY3qxtS95J

7Q4YgKuhk6Od49IYubXXg9sOhIpfgKbHeBoCaCifgJCx30rx2aS0zv0V7LYY6mywzDE6WqnmTp21CItS

3c1x9749zbmN7hK1J8x0P3Y79TjXbahKx04JtegNHk
7TWlAx6J21LVDHugy1O1ecggGtnjgFJAKEHcNKo+MSnuBG2I3qfC/MWf2VzD989ZI4bFnx222Zxk7vRe

Dq5/o15Do5XoeRKGxcpbnkHtaVivU7l42uoiEfG4tE/VJycHFj8/HpCysHHOVTQNZxtgUhuA0kgKUJvV

8xPhko751CRvM7aYoK9glcL2R23eQWNbf2aLmDmEEJ
haMsgf4u3/A2BixFFguVS9N1/bBFCZ09v8DUPVE+KkbPiN8ckltC/ZQfiX+XB2SgoHtno1R7uhzHFR1q

kzcFxsTzZu3TdL3WYYYaIlxbLaccwoH/VLPhprVPYgSHL4S4riuUbiuUnGEzQnnL20NIvWoPdF/ufPOW

qZw+p1VLQ4rIcFczlo0apxpEZ4KQvWeOfmZnTasok9
ZTAK8TTr198vixNpa7uKr4GfkEUOfTdrWFr5HAIHmcr2C4F9DaCnFMmIV2UFIIn7cpEejjvldtd9MsGx

P4njN2jh8xNMyHQpwP1xneFrm+UaqGferXo1miVdTzFZVerfq53M4cYEO9uzgN+ZMUffds+ahKJy0EuE

/Kr9jBp27s3MMb02/CslRS+4PeWEqo5YtLF3cSt90J
R/GbcScpeFWYP32t0uJ4T1nlINdK3+Q+coNIzQllO53Pyc8mBuprW5QJQiLad6swUcGguHGaWpUH82LE

EHL1+ezvVc2NjuYpr8O1bmGK+0t8l0tiXSSauVLgFkkKEsiu6OVp2dxvBlS9w9kLXXPkLSxkSwsF6sZ5

somL29D6M2QXytRqZEVQZtgQpTizk1ryYhDtqsomLo
ijC1HTQjPv4QWiPam+KLZxVM/uO8ODHqg9f2laWh1ydK2KukR47U4Cgq4Ov8KIQNxPvHK4aduskoSN0l

h3LuAP0nipJulRBzxrSzwFRtssW5HZ1ULNqxha2x4DNY7XLFOxR508ewCyv42+plhPs8fVDulwvUO+mp

dj18xV0/RKlrOVPFh7B9xe8uNyWS15vzpNRwXOFA3G
m7Nmd5bnMOYgB6gdc0ejwYRDLpWvrc+GdwyYz8TxcMJj2X7rae2yq/h68G+6yi9xWNOey22mQP/+UrL3

JoaN8LDVHstdrHbJvTe8Hqkownccjf7dTZU7+mV8+e6kz+LfrOT5lpDl0fO8j2Ob5xOPdmTbYe9JKQ84

USjV7s+EkWUGrftFijjqJYM0inB1rl1ZAN2XRH4BdE
9o2qLcstsdI3+F5ydxnmvmiiF+M0qmGnll9qgUOCyiUA1vhZI3cbM/6eCclxhw4YkB2m6RLsBOc8MM17

Inmlvt4Xq3hIp72qqjY4ID0lry1dspE4228OoEKXQMe/8QFZCWofS7r8wtVS6Bszo3LnkGNAsHA28NbG

Fop0AF+u7x7c0CfqPHhq+aBmdW5uS0NxwHTpGC8S8W
+kXmaQ4M+Bu1tnzHeodN3koWVBJ6SSD1LFQ7ASk7hdJaENbiX9yqcqhOyC9jaGJpKkV3WajJb040LNYK

rk55exrkmQBWJfgpNqL3ok7Nqzic55rbbNwPp8Vb3285VfwesI9wGCIOoRoGU88gLLUeroG46xKZ6wR5

u/8gN8dfKRpvIgq8Iwr/B/NBMgcYAsODRFFzzLJSny
KBEB1L46xzepHrVUfVlPzYdqtcc0pDBVQZNouPCYl/1L4Eo+jwYT3But80PRprO+ylPhtnVOl/2RMu9G

Mpaz+fJJgZEqLUsQhv+DgRBapH9xPy03yZwIW60m6db5fgcRAxBfP9oVQXxHZtuwkJPejIov8B3WgWVK

vJhAAH6z1WhN7ZdjK7t08Dwk0djZx9038a4kVlp35U
szv+DPITASgDaZeSls64IZW632tQQGOn5vZjxbZ0GgaSlgprXvCDEM4/vtv2/niE9BCHAZFTwLPRjXUN

dTn6CWsrO2siT1o4SkHVa2Z8BQ3wiJAS/Mtnkbg7+HGaCnTyQhIsa8Gmyc+y78FSIUqyf0KqOptiJQTa

AGXq9lc/hnIc+KuMefgMcUePm6rCFYr/+8TAQiDzBg
bUXgz668Dj2DvF2fV1hEtdscSPmzcbtaB5EgH9QawECgIPrzo+xOx8+SAmutRAqhLAKvSY5L72R/6X7C

xR11qp/qkVrDS9Q+45GhNfXEmwiPpYH18EZkS6k2RRpM00/YuNEPJhJ+fLp9r95u3vRBW2kiU4+gTiXA

7YD692zCdz3NJIJRQANKJdWv5KMvwU7ymCRdCs1E3t
3mzv4OaPqpAu2HlaysYDIDotP4+6GmIJE0ExTT6vr37rcc9x/KWpWw91qmvtyjO0TZPn2d17R1xNtzUE

xeXGfRuarpqsf0HrBWrUBfFXomqCIrlbbh9FDtCAlKW1Luh12RH65LVwOpJ1lQIu6ToYI+X7ndSFbrvz

qxZyNGUxjGtXTEheFsEqSghg5UPULKMORSD/G0MQO3
sYkqGflrFiAuuEBzrVWJ08Fq35BryZ97vewyYDAk/Z2+ydQh7qu0IToSq8NfmAiX7ygY+2wiocciAgl7

UGGqVfMMf1FOUsVLuBm9uecMPF1ynJRtQyI9Jx6N+Vy0lhLdmLwFWOn3GGpvLHz9/rinZAcANOrZsdGO

ckqpbAPHByLmzWVkir21DZFRGi5d+yVp19uH3dqGW6
20MGvf4r/L1RaRrR3TcYuVz8s8eqc8I/OuVAb5d3xwBPSBI/b5AKHxpAtFjJydAbTbJwdcxbc/aubb6x

87k9D9+We79ybeIDV0s+Kokpla7hojDHsKsDLGQSheFFjQ5bacW236rDjh0Pyunw5A2jAIEBIGWb5V2c

xhVBJi3jIgYEU3emGrTCc0I5q+jiVVkSqbxYX//Kbm
SLobsMMm0+n3RobSOBLLdWFRvtzwzmsCebzWn+eeVqmPRHLuEh/bLFLgIIy/f6J4HkJiCCwHg+pwllVH

znQ8U6vUVRJhnaJkDGaqT8/bYgPWrH6I++FQCZkouIh7ab2UeVz6YKToMJrWnxuOwlA9wPJ4F6AZ9vbm

8eq4KOcMi2vRhZqSD3svLNk6k898bTh80TozHlPJPu
TJXenVO3M7B/zTn0+T1KwJ4e9x+cViPIHkJK50Mc/x+v46oU/GbN7AHyLOLQGvCtflO4th95243xYkyN

TaCS5J5nVbXQptAyF2EQKM45Z/JEXJXC/VbRx2IqYhaikAMh1mgEN/qb3QiJGFE/x+vXc5cro3sM/CoN

12/mRGVdej/rCu1OP+rEIFpvnj82FDCpkus91gzk2Y
sBTI5u2VDk36XofYMgSj3+OTVdyqc+/47Yqj23SD789gNzOBi2id0IQco2W15dVI7O/dvKFH0Eu3b9je

foo9GxrLoNsf069hwh8xea9/Ml5x2FvQ12cNbj+AZtLMhdJxtnPzmevnq3nuN+I/WiY23/swv+XbSLbu

ZDw0/bESlLJv17YKFCuij8m2mXFw3O5GifDZR0fYVP
uEJn4DcQYQddbXoBKs0kacg2JT/9uRKAFElhkZU8o/wO5VgAg7edEKEmC1HyhHmwJN0KVJ90UetlgN5W

nT3brGaA0yi8I527/1Jvd1UC2hGjGdku+SyViSt3o5asdTxTaJdWVR/Hj5wJhslYXLEkVcMcrix+A1OM

wOdAcMhGN/LlnIwZeWAblwhI4S+HrEx0jy140oB+Pc
KuhwUe2U6lqYSlIcNIlsuE2v8cqeblCTeFnXYNRiLYwB7jKl0yQHu4bVBKGYayIx/3/pCLzCk7qHeC4x

gydxqL3LbjHtEbfBfLgzuuvAZvpg96+Gik1X53iA+8x6S/RRz2jwc70d3JsiHaL5+3vztrVm61U4j2aL

47silVtUwevjGWedBlh0XbGC2vCRzFO6Plc/CkBQjE
4TJQksDtvBg6cz94pcs+Lf5lI0PnSSJnXF0uhlbGUEvGSAguI3hnNMmIf8GrGymk+8rs8p/unhNzSUKV

YLW4s5zDUb+kuov3N4kb/umdEkP8p6vJ0QEzkXplT3aCDw6jYBP2kZh99y59Z98RtF83O48hdNFWxI6B

11Bl0VbI0qgS3Xp3dm0RB5+hVD/tXJviAZBfe3FFSk
UMvLoevhlxBpY7BshdyCpj3VS4rdke2+2g7YjQrHLIWLPBr6yfms/fSPjc3O6MEwRRaN/j4fAjzMzExn

iv/sKIPPsOdWbqxfAff7NFswxTZJpzwOpiuKrIlGY6xiIbyeyQzWqf7USR7yFrYJHFblXl1cFnY6sH5r

6nMK+P7TlO1rFigTclhbVVwAWXzIOQRYlzXW7fsv0o
+iJUdx65mqqAFpUoEtgY6mtudmLHVwFtSyPuTF3u0q2zk2MqiyC9mO+cx80XIHT7fYqaK+kBwu3yACR4

sZBsxyHz9OEOOLq9opH0jHYuzHh9xlIxrOGwrelggEJHq9ULUNfsQ47O/xVT02MD8J7p8LNonvgDLETX

4A/1WHk8o7rTjmXJbd5616+iwUHzaDmr13DWwyXMvG
k/MiFu3b4kO4Eo3F/0vC89qNzyQx5U3XzmWr0k6qEhVckAycvYowVeRBVFZLM+Voqf/UGyhksGcU1uL3

MKSxfVWcDi1mIoRwGfvVkOqwKvidwrDSYYn430Z+yGHQ4zIyve/ZnakFiGO62NOYxN+T+b1t7y0dS0je

H+T4SBgOYTlbN49h3ToEjgxWkXd6FjSCifEm286N3Z
MpA2SELF9mazE1leZOkPDHUfwHfjvHrQ138qYJF3j/alBOVmLHshf3pRn4vWA15JoEkqfNRH7+QYAHOF

9Ucik8/bEWfDEMZStSniCvn0rzx9FJkwX1e/IUfRQfWcrJwi3dmXgnHsGLp+WQGiUBe0sfdHQxZaQQLh

Mhc+mWrldd/SLtsbxAGz0ewrdxFLVev9rx9ojVozRC
njS+0xitlLigSzQAdiNtBq1mKPmyd/kKwhhJYJJ1qBXxLB/vxhbGhR2OyFv7Rp8pg13rJ9afvVbET+t1

bgjBHPYU4ZEg1CJe//mzlV61vmHVyFUPgE5rtgfpiO0r23jkbadqrJWugW5avJboR0eTs96jzCKhXnYL

YwSU01oW8EuW5ouIu18i4I4zUQTjYg2g0pW49xJn6g
zuy7egFfmYDhjhFlp5fdv+WDk0sFgpEhFBx5Bz26yLley3Rbz9+lW1c+Vpy7L6tSopnY3C5lGFnyxajz

UOdecR6Pjw2P8tKiV/zWf3VEqg/hBj0sY0L7zv/dQcqvBlqN2MYVu0Dl2wRciQWwwz2wxgtjMqPtmDm9

EOwz/BYNj4z2SLRJeYCjTcCxObVzSSRI5MGq1ylrIw
DtpDk2W66gYvin8SkFioQ2RyHCluvvYbcwYijCN3zNhdXG5vtRHg892vQvCMcilpuKfvAZmVN2M9qf2f

Ahn1lbCxlR1np0WJ5A12aw4SpJfUK/upTmv4C3JnPzxlgTg43Zt8IuSMPtzZun8jAMTB+Hx0L0+yUAue

SCcPWcolrmT2rHHfOJMYwpA/+MLJpGryrzIfqhEwXX
U6Nbu3wRK4D4z5fhWDovqPLOXm2AqJXpvAJhy55vLvgx3XU6y/tVhve0mAgUrzw9ku7T08LacAv9lsvO

YkeQELDkJF4VpEoLfLPG16U4KYxq9Nu1/iN5A1Jh03myFjtjh3XYGS43aSKiZAY+0aZU/Ogv6EohYHfP

5MuKzFelu5iTZyfPfXrLtoCKQnVCblAuUtMEea65Ka
tMSZQo3SvGpbakYQr5hzlMI1lIaXuUjgoqqUbbz645Xmd93Rzj5Oy42y8BWUTITMWEVJng4qqK4HKhnN

EdGQPFwO1N7iF6ydtlMqXP9A9CYJ5yNSjiRan1A9sH/Q0u09o1z/5utmF1a5w1nyiO/OEswjiYB60JTs

i8FeF2qup8IgRUFfe8ggMhcSkj6klOKwdT0fSQFfuj
MtW+olX7IJFb9r4wnIF6fH54qYSWfzioJno1vLzdmHrsVDzCFyNCyLaLf9c1lN0mgWD8+fO1Bp8gWUny

zYBl8VzfqrlPhHWowPIWkz4Asj6ihLuBks3qe8j3BytjI7UtyKazGe3ho+buRB5l872f/GDdWdMa+xuh

9rFthFfQt8jS+b3vGLeknjYRhYox2+bd82/Tpa5gY8
OYdAmVlF+/sIFyZHCZsHuTVEc/UtxVSWslJyDkKgGYo590v759fZagDalccYMFqbMS7mpmkdTLcsrW4t

6Au6Eoxpw2c3gknkb1ewe1HYh/Z/VJjIMbQpW8jVdXQzYe+o+wr40qaD9l5ktslQm5bJxOapfgh0hM6G

iUg7JJ6N5yMaM6FhvxhjcEKweYA3a7LymEI0P73Rlz
jwFvFLhWGaMMHWK1IzAjlphV3BGP5uzvafQcml56d4kp5LcJFScTzca3ZJWA0EAYNXnETTTHTLbGrZ3Y

/HbyYjTmfoCPKciiRgxZXJCDhy+03p89qcc4eMLHyhsarOJN5/+KczVeIQz/4K9ep2IatiCxG7Mq59Kz

i5y5XlgiLGWMcyjEqtKkWWSmlLupR4f1ZIuonW/lIX
+OJMgPHzKrQ7kEyRcxT5fevIms2mmBpm7DbQPwqSbdjSFYXCH5ua+EnTFyh2SwfojpsDBWIkVPdcULrH

tyI1eeUDQQ6AHHhML48c5Q030BdOJPNUZBu7aIrkbsu/LrKeV2vjBx5ki10n4C7XbE07g01/yKMuhpPd

UBZWsPaePE4pdYU53eugWImGSycpxXa62zo6Fx6t0Y
vn44+H13rH1yG5qhBzIal7wktbQh9BsFAC5Rs1lV9JPlMKt7CUvEsGJMscmh6/N9X4EkD3cBU3S64Bus

nUzz9v8Nqg2KmHT/IrDhudjZLHv00qXZ6RSPJtb7lFXJFkbcY77li4gtBX076yciOgc7dZENUwm672da

UiJzPqvpb6GsbWFzOj04Itvqtpxec4zqfeypKxRVQN
cxYmJfr7xdxePZcqQeng76ToNpN383eYaTbRg+N80qAY8uOrsadw2NCQkr8WHCUBsu0FCZF4rANJre5d

bei0otoYEUsO55Ph3PiToY1gM5GEM01WEJrc/Tp9ySn0fQq+ogO5FlxPYkiiOK00Mqy3dCiQKdh7MpGB

dANQNxEOHQUVxhc79My8J1cibcTf39VD9FeCz7/lR/
txauwo0s8VLQCMxKh1wBKG780Yk1kLQi/eC06gCJwmWkZ9v7Avl/8Gvs9hg5OcCBTKgpiF87MNG99PZc

jqMGCZkRjXL1NW3GwFlX88DTgf3eL+3WebIy/ShKMExErGXOMcbgsbHsh3HXF2dH55nMDO1e4Xiozi5d

miwCBbcrRlw/pfQQ/keimCFPRdWsm1L+4yxXvP+SlM
vmPeAhytlGuxlDls/50yWOYULW/oywQLbnPyf5/P5mQWrjfIfH83tmit2KomgcNg4ZoTO7bOXFJ2CdQX

JpoRClAllSSgynlXqHT8LGXwZKT0zPbd0GfbJ86pFPHo1sFfXQQt4HQXM8oyoE0dqk724fV/aWHh/dic

Shuk0vf3u+P9blUic/4bqx3TTvYaY5hpekT+N4Hq6n
qy9EGMu4Bj4ry1fk9PmztWrkbkFTQRZcfpaTe8BgqIAt99Jx+rAu1ugN/7+ngxl+sfP+h6nZV/uvCoxq

9sHeX9c2liMswFP4PkTEMNIyPWUTzMuRsH6s++MpD7FQS1cxnUG5Rh4NBEyXJodSw4squiN3DrZDyaeh

/JGsSI7HPqJZ90GJ8ObhyYZydfUv2nMvAGC2pED3nC
3arL+7ucCrpAY9dyrgZCKHfNSxvubedYU9EdkIRok/Ts/sD6SICCmKt6rpnKNyiML6BOeipb/pRV+1Kt

X+eWcXsreIpD1jDE4DGLh8N+9k+H8d8Le9xjbiyH3n2p2oh7hg0aNAQU/bcaWN62dVyYA6DPHXR+94vZ

KH3GLs0+deqm65r0VRszH+cz/7V/WMA4hujhLWbt/5
XJhzT68cv2Y079DjWo/c9UHZlhMnfjnb6qsCiEgUpMt01K/XvGpBfbSM93+hwJx5B5u5LIcSe5IegDSg

hYkBpgWC7MWNsJMsjSIMMU8XsXoUD2ORzQLN3kGy41i3E+f+ui8mvb6mI12uu/e2x9e7nWJWIxvyZ9Sp

WCkYaSaZ/Ps293163pEr+CzasyGAhfh3S0aBNm0YVC
1+jLlLpFEjAytQdzCoNRo+zbq2Ilu26sMr89Pp+P5Ps6NpfFkuOD20cQM2/XA4WwM/vkoQt036657KRt

rwlCJwlMaNgHvimlcjRne3xhsSGpPzOYNOcKaESlWKiy4wUZTJzqvRb0Uk5cygzVw7uZvqi1L6z9SM7M

2nCyxBoLrkCnV6vF8UqgQ3zkO7EE28TkqgsyQ0c6Na
VmTuvK9eiqQk0zGbITa2qWtuaVZbfB3Cox4y3/uk/rktM4FKl6HyAxuvUSEdd3NndN8zYHXx8wM6zvM4

adxbnskiDndbkfBx+6QZabbYawxGxfZBgbPOSBRxefuwLyri9IHqn40uU/KP+QqXF3gfQwtd8vKz32R7

9XYPLLr0tOZkY+cTLyvBMyP9pH7UfvZUic0ztVED8J
cutMjCvt4D7fWNUDcb832FUTAaGkO/ZR6bWALnMuiCTZfpk4DFRytAXXo+ezSHo9tw6S1Em7ohHfnWtl

CiJC1fSAVvo/tn1VSWMbbBptD/do4LRe350IhKI1kaNdmrkUfLePU69S5GOjElwdXcPq49/wTH63Vbom

WmMABbKmm4icu5GyBbLy8g6rkc09pBKcLVzrru53zd
sfo7WMLCjjG8/017UMGBWdkJ9bUwweH633JrU7jb3cJgXsCrN657Dc3Gts9SN4fewu8gMgxmQDfsmoE4

T/OC/OpVAT7Z0qDDi3D13OCr6LOCD3T7/VS3t9G68h5o9JtZ3IJoiTGEfK/AnBdSRozFDpsbZ2qArhIt

1v2ocqrS1HyHeGNjGoHUZIYwKXryPBu3hh5R2twQMC
NOG/cnH2k6Zz6NAY4CpgECAqdVqz8rNon7fN8aHUVVgBC9+rxf1FiJwHhRzRwF2aKeGJ5KqlMVfmhXhc

/Zjq+0I85nqDJ1Bvh/2Q4pg6JcAj/+XzIMsOQvsZJWxtTxPbU2qFIz05M6FoxuMLGsVIxU5fsU/T+P2v

zU5odiWrla4Z290pQoMhbBDPDb7iyk3Tkln1NBHQFQ
UNZOV8uRTOTc9adxwwo083Q9h2Udgb1Jap66AVu8yGOZqIvskscVPJv4oj3RRUfZKDlrXQVdaz8F9B0p

CgHHN5jIPgjU11ziweB+f4tGhVMFxQAbiram8oncKsNwQXHEKvu/zHcgbYVQK6QRoenTOp5p55nlSfvF

wZs0yxyctFfvQXMP/dCowLnuW118ybQT9Nvk5bOy7/
O+Ubb2+hrTxizD+zbt0PvCzCphh05lhKff1mcoaVaDbm9ti/Ynojrv6D8RjQvPwm2WtIEGaPkkarJ8c0

8NMAs4CkU5+otWpen6DNg//6ATb9MbJRamcct/ggpE2KYwiullPR77iK9/PBSNzGoMTD7TW2A3f4TBr1

kBNXcKGcx+mk7mB0rhG6hnAPSyvOrWpV9kHlaJGwUX
u9V6lL51qZoQY10VObl/fHcKSSY5MepDM89nKu5QhfITKa80agxNiclsflT0GCYBwsJn8IHQErpr/

SKBNRCfkVA0IL14uC6oEMgKI7fs7kcJrXztnPp8buK9fdln3ijWu+yPM6FsDnCFHnKeDxU/OrQQ9nsVs

uWVX5ON89fQ6iIVbLOIl7bt6sg/4505tw1Yzcb+K3t
oQ/zMSuWRVrnNnrPn8Ctg2wqc4nNDLVX7YPghO+oCmGE17kXDsQMyMmsA0KDl9LRkdH1M0gKTaWQeZwR

94LzfKGn/jW2OAW2d5DAIXicYaOY+d10qP94Da9uJl1gjPyJSgp5aYQkmuDaztZX6qquV9poF0pEozhA

ETeEAj8Rm6d8BHHX6W/e8A27KDYOuX1A7EJ8cSmCZs
+c4TS7KPtaExPRHHBlEZ9t6Pl0iNfe10MS8PWNF5Dh+ARO1uxwbpBtRoT3cPJLKa3M5SwOtNQEo6DOe1

lPFCeJE9t932WK7o3nvNGCE+0egak/sYW2ERp6imxLR7o7Ai7o9ohx0MhocGwWAkEqvF6FX2icUmH7hn

Bzw8GNlJ94L/Y2v21s7FfPRv+RdUQK3Yg+gDsekg0Q
qtkP6jOGy8ENpLm7AgezmCrZ6X5vDymhJsjTPlFJlZXsdwZwfvjtcJhfQwf1KY7OeTjbqG1rt7GUkoma

y45ngzWnYId0XgOuB+kgtHLmVhMLOuxBad0QRDbYTW0j9RSeaNQYDqnjM8bQ1lf4eegwapwwfLYlxGjL

YmEw04A94mVfb5m7jX5041bxIzzoGAqFisD+S7/bJm
EQzQoL9gt5wYWZ6SrdwG8H38AVqo4ypPC//k8kvUIvrTI+/DYKTyUJ0qAjM1iEztcCPNSLQ6MxsDnue/

yEHgdhXiWvu8nR4StIuONwhqpmC380K2dc5d4NREP4tiC1TDrWGGWxvhytX3t2wY/1p4OfKrP0KjGEVl

9xP3P7/9mTNvUdee/hmTVjFJCQcfUpk54RFQTPt+zp
ksvQ0gahQbqGvMJzq7B0ZcGWho5cCBVGXqFhFv+Z6+Eqzvf1XRks9aY/QGmvLfBw+XST2tNVof9xDibT

Es2uWmen2c/HqrepFnGCCJ4IwmZVYLfWH6dUkHBaE+nb0UCaca9Yx5ou1f2etFSIB/fPkFVRd6AGaOQe

WwJ3VtK/IAFJ05q7Ty1mObIvYYzOorNaH0PDMOYPWx
fAgJz9PIOcgkXeelV/WKCU8t19LAIXunWgHjv2mNLUtKoetaGuhb4sPVbKIf6Lh+I5hG0/yfLkgzZSTx

5/Gzzu6sxpDmURXTzbIFeYxS26KrKPMuHXRWlw43DTIFxJeUhOdgvWUNtY/Bu8KhMGvUqZA4OlC35kGu

+iptj+HxS5Oc+4963whoVzjadUSXTN34SImEEHIamC
MQ3xV0CisYaSVm2gIrC8MVZ3vNoA+M3WHQ5kJQVQGrdcX9yqpi3xShiqSPG1q3c6gtpunH5KDn5/Nx3k

ugCYkHI0btUC9pxooOm1k81bM5dHGZy7ay8xGnw986xDjsdgzpD5Tm2pDz1PNPp0wZO29ZE6TjHFBHQK

0ljoZpreV6NpBbJdoaKrxVmzaet/t8CxRyjw9zp3Ur
Cs/5WXF2Gr0NFzpYKNusRh1tqThExdX7nn02Ypc0p2TgfqiI/eIU4Y4yLJi84bZ/iVvi0I0dccANGM+d

kYQCZ/QYGaAhj7lr2b4aBSQevyOVsh+O7R0BFUAjDkxmbWd55EYcIUULtFZwvXSjOiJFMX5/bBPbUXfX

brMgTBk7RwBbKUavXgIOkgVrXzklUQ2+RPSsFZpKpK
rC12uYYtpqYOHBbKIFdD6vg54R/HRg9CaNv1WRnG44bjx3YsdKP8mepV6K3v5LYS2l3GuWEZCYN6PPbf

1lMtFu0w2HvbpgOOEJ6AynzoUeOnkpgqJOnCDxWrI0UoaTomOsMvqCsyTjET6X+9+/j0xBUy2EX62ql4

Yjm9cL7fjVn2Agc5fdU2kxdQFJpq/nVRydUsVWBSwQ
4Yba5Qrp6+u+CDF+ke8jd3Jc5RqoJYjSk4H0Q4riFfml4JXBKQF7HJyrYW/E/kEGR2jAd3ivD9o5hE6+

d7HP+5KwxdcgXCpIiplcot6ROmCI3/68vOF1ZfWe8PD7/y1JffaphH+ychBQLlLD3Ft69sLfXrUYSaO6

c1bgHnp2iRnWTrsC+X+nGQhsjGj2b5tran24rT1ijM
zovwGUavS+gb/NVk1lDWOfZzOLUsITzTUo7AG2m4CbMweFz9QruGp9y9ODZ3+y4Tmsfnj4tLOm5I5QVi

q2y7rgwnp7+3j9bSM/BntWXrNKg5Qqn/RChglD+vpN2gZ3QwsOAPRwYyAeg9mOkAfkvLDUdxVw0odOWR

JrauW+MvaUdafU45k+qt5Z+mvdmrfdnbDIril6CeXh
aLuxVtomWekkG3yWub4um2E2CoRw4SWaS5DnySfiKbPf5DuXnIDfwTcWk6LgTv4dwo2KzNv878irEssl

oMeQnx/5jfD6ouhsMaWRe2kldCNmjKAORfbMfyTT9FnanJlEM5WXHFlSCu3E2UPG68aAvowO5Zt6L66g

UO6Vp0xDlpCWjvIC2zpZ7bt9BW856kzzy0ZnoRk1p0
NDcv1b7b9d8bXRbpO1XGQk8KfdGcA9dr/LmnCJcQIGptfhsj3l0h0UaD4m3Usm8P9aI5TMAE3Dj3wlNS

ylfp+vmwyk6MhaEaw4zzIwC8nL/ND9M2ObKczmZ0xxrped6MLDqCg5Lbz0soe05zFxYhMJDtp/bsJzdo

0nqLDsXU5A1xfMcqLKo1OUW2bAMcbIPakMOqattdnW
lGJNsR++omjHHHlfS8FiQ4FT6cRDu2Dw+yGuAJYCsSzV1NL/MBvRkw/EW03W8t9A6fH5Dw6vo6rT0Dog

x0FeXvB/kPLpnk3TZuvTZMk6V5qo16yDjVn+V0VxglED0kCrr8c1EeOhsp87VsUHLIPxeOZTmZMhAere

wDdo7meqUU64uuVVD8VSTT3vu75a3mURBOyRmw4012
bia9/j5BVWJB+dpf36Vbw2DKZ9pATQa1y9vQaliey2ZuBRwy+fNKe6IoTBww8RsfcD5DGM5UDZrJJNlp

8GItdFObVTq/fez9i3YYbbYMwbBiaR+5AulHtPBLc71KgOUZLNt70605gY0/Ezg49ckIFI4p6VM6+otx

dYJDwJCuAIJPaOThUGsOIrqKOnWlPgRCFSJGeanrSd
Cto2lAAQd6NLPye5flplhFlfBQ+ADhHLG4krBp7/bLjdyVqZ0so0VJgwoRt3yMewj/pOETLKz2OxzfXC

D9UtiByIELt5QrTV4YSgmNNRtkPB0FSr0zciGoLWUnK80mvlcX8hr7JAqPjvotOufOSrxGqaOav/OVCy

U1w0zHEPRRXp4lM4547YKMnKcbLGQ2AfLsJkTUYPD+
6xdq8CXk7SZl2Dc+4sdac9YhOM0Dxpnan1U7U6lLpVbTbYAcpWQSpnrtoPZ8xJvCqIyztSu7SHF8qCiq

TE9l0aeOIDoSEAi2zkQ//8smEAiMPJ1P/pBiQ355fmEvrT7JFcbx2SOpIKq0ReQePrrusom6kYByFer5

Zf37fvZ2gS4Kfcr9hqHa03Vrks+RDu8cYFIFPAngnh
h5FdryS7Le7hEy7dlOn5XFQ/xabFbBgTzlJboF1MJQufctk+b/If8qV3AQhJv0cpuXdS46q3XAKa1wgN

YvFiuFjYlXGqusT/K06IUhc9ev12Uy37pt5Yut5cDjjhP9dyoYJ+ZeiwZh7nbcACqim9GZX1sxPoVchg

h5Zr3vD7TERviEEAwOguL+5AieJ56PCR93wJA2S10/
ODLJQWq3xE9un5zO4R2e5jMr2cxkfeR+6xNeH7RR9hFfF8/l7sbpZUxG6Y+9itZym34ju/frmRPbTd9C

yT/fyD0OqjFEtyome8hoJ/JvjulzPyNyV4b8PIA4y2cRYd5N0olv9YKUBUPhAzKrpmzurpX+4XLkVuWX

jA9gmBWA59R/FU4l+LNa+nM5ai0b1zNN1a477iPAiN
yo3wLNGrV6cjKQnh32psA/OT7X3GAVLBgBWeNmTGDk5+Oj30GI+Sb+91jjjN+F/VSy761LWlf4hs7zAO

ngv5BEiU/+e+iplHRTbTdDxER5wwPUsj8OSj5Uj9rQbK1y1PP2emt0ForyQLdOv7wvHFVHYDEH1NFg8w

3zcfk+QLyqix19TSNFZ+nQq5lacoy0W7RAjwDWn6DG
cQfcHbBfbG7M5MFHFDoBq9uBRugD4wwq3hyBXHdgl0+D9DFvmKUEUma10IZGFr298TNE9zOPsNRwFGcn

YjNf+uAkqX44/+lY2ny/gk74PQOvtz24TSWr1ZnFNkoa2EySa0krVWsYBmZveA0AHWK/TGuw6MBwjG3e

5n4NmNY1INoiH7LeCkbTgZU0Kafn4XlZRt6bh/DSiV
QEjANqjxtYdtE5GbtiFvdz1jiPSFB7JeXjWsHDhO6Ds+jjTiFQMC8bCygBz8ioeaXwJsO1gL9me+jOQP

u4LElDVyPtqr8GPBulp3uQC7j4m0k+fQr3UOQ8BxveMZpLqB+vvmm5A5NVxjqsWRY3TjN+S3ferFkiKo

sCNyd9rnGqPa1KSndw7P5hlMqr7nwDJ6HM4TW++ZqZ
zoXBb34XMCF6We+hyAAXyoHLYetQfqCmZx0riQcM0dJtjo/0Lsqkz1LY+vnzVfBMp075itmTTbFLc47p

LFHY3l18Vdunr4UFLAX3swtEkdFIF3eKx1i6FMD/NOm2O1MbIT7T/VpbR0ifWy95fnkePiRHEWUf1FmL

z9ixJ59nfhiC3UBf1bPQN3CWJljHG4z90hd6w1i9gd
meoJ6EK5W26u3w9byxwPyFh0QXlPxrCbN3Q2ksps6Xeakm95H+iRcER03h3LRHMn3H3vUJMotrsh9TJq

6H5e3OfqLk2aOYnfTzPCUNWHqo9eFyUxORaVYYO1l8VQGgIgPlLlNXPNR3ge7x99BeH5X35/ANDYLwrJ

CbAGe4WKnmxVOseZ/vKoOEUUvtGHeu/5R+fEWI4iQd
sNXFifsMLd9h0HStx6P798N8bNQ6lIOtf2icWTTtXe0AQhRjlnyCwkZ7mA1uNm54qr8o+159JEs1KsL8

h05C2g1nmuH5XPQxUaQYYQeqWnUAoAvwibzDVN8Hgu4rFgFJiC0fU2AsRoC7okX48s+mDGnt1Qs5peAR

GOFtEk0z+OKDm74jnshtJSuRCivWTuRkF1G/j9TIN2
9rzoO3rQj4jbgbYkaJwWzbmzZhnFFGL7W2OovOfCpwIPW0kvJcEKNQYpx2isu3w1yziCiBTDxFznJxh3

VzzWdfhhcpBM9/EEfJ6vG09QGmb1x1ix1+m5cEqFVV12Dpxg4feBdc5wUry5s9HjhJ2vy3m1F+hJgfKu

8ICZz9W+9fyFhyk5Uk4z3zZv801qd9FMbetitaLQlv
8LbYusm/wuyTDELHz2QtQlvl5cKL4//VWNg7LVNQwgJmJbRWEiURs3zGyEPtudFDd5M1u9ojpTaPVCZd

qObknWkNJ2aJROoD1xtWdTEhsioaOtvY2j1yE+2o206RljO1+adbKl6dbNtUiRpGqkIqXBarxL4qiVqZ

W4WS1nt6VewFOm87lXbE/gbM53QwmA0/muD0C2JCrC
PD/UbnFtjeHRxfO3dpFHva53Ed1Qw6R3GVWj4gluOnj8gjWljB5/yZ+sNqjlbnqwf/8g8DGF5ry1PEjs

vOlSV5veAIc6PbVy32ElahfMRRaN/b0L7kOQWI+OuaF5Kj6Guq/RzDvHWCh+41FxJjqDgJLG0wa+2Q1E

QEox3v7zNiwctkeUeqpN1husUdmFn9TsBcPi0oY2WZ
0Uu2IWpMUUC9FGXbrGKoRDtOJsHFeLhZpo8v8Lncjokf6ke/t9exgRwtze6bgnCH4GcG9CVqoVpWLQnu

ELrqYY5JIMCUPuBJb4+xS8ORgK6Hiuuh91QSI+BDsxstHE4SE0vHOOQG6hqzpv4l/x18QVPoOJeC/wd1

T9T8eXhzHM461VnQ610h+IjFDc+tgxw+QSZPwA+OQE
GKm07Ylvjf07TMOB+hsi57ZmaAvIKHsADlD7c5ngOYcFA452qh8gq6z/CAoPemtC4beKUSGyjvO+Ev65

XgC5RTOED2YlPcatdCiObAx5f04SB/kinYSqI9c5sxBMGSRsQX/lrkNuQOw8L+9ia8yYc/IM3s3sYQep

0D73m1WmaIdMO5i5Cd8N3qvrtkEdO7TKBEmSxZuc7q
pvBcAqEmYZC1CJ5WnxKnE5bE5lXDq9v3XPVwfFNOef/7+OECE3p/y1lrnbO62hUkpULX/1tDeIkXsztD

h6gM9vwGYqU8VJnn57ww9g6eIN6LjrycGi/gNCjug1cI+hdax4O/ALLj+J9LT2ujqcZWysfkJPfbP1Sa

2ZDs8UeekJKcsMmtvbc8kbwD7pNveNzJgRDP9wuZ8p
/llu7e0Ac6ZW6jeRoRDgPbWT8/OvEOb/CbZBuN/JjXax1B7hAInhziaY/xuCpO8BHizqrdx/yIc04Z6a

/5k68ZFsTVJLFYnXLY0wHSpikyH62qikRV+FLg2lfAfxEZI3te0TkFfcDPGdPZR6xBhW/27Puq2jXQBx

WZahAzr/+Sl7orPcPZtyxLdpiw57YGSgLcFPZiT4xx
QsICJVMlLaoQgQLbIFz1EdNJ2SwBZRtJRcokeHorP/shbdiLB+N/XcK31rrezJCmt3ScnESW+skPgAgK

pWS1ZCQvKPY0n+ApYdkX5ZdHylKmES4ZngbkSc3QLbC+Y8Gz7/61Qa5N1FzxO+CXPTiKIBfStLuVIlfj

Ef5yCOfrCtqEhb78KEI9qH/9WPof7xEwvYm2KsGeZk
4e/Ly2WrIRdETvM+qJc7sklvUcKbMUoXBrDyCxIsz/YHeaKjGVXG60gNMhxPLKFhzt+CEr/JJCYk++/j

o8Pwoiu29yUkHl0ubeqlpKV7lq8BA+msVqoCQfrKVXLrQyRO0NF0sIjc+FbaI4CZpom6LU7Grgb9Ug1a

Fa8E0DBbmm6/JBbAeHmMQluh6wt+4c8HqMj+rTJ35h
OF9kU1UpkmkITe5pbfFRmxl9Amj97P4u+IdzgNx4RlDWGQGuCGG64oxEBCGL6DkjwqhcsC4UuTd3bblp

tJt4n+VfwWHcDbi50+3+421t/hvHyOIGPzMnDNh5e8q+70VTY1sEXCmvXDPdD8B/QtX4MBJ8k59+pP25

lvHgWki8Ec+36EnRyb+r7oK31KznVIofxc933BF7PA
1Qae97mUQKudUJ6Avr5eFk95RUYAyPYha5H3CmloksDEBewW/QYDS1suWJODOBcyEOKKLCd1QbLdMEYL

uj4SSKeKunFx7AVq01dkRA29keN2Okw7QgMJAwmvWhVNNX00FshDkbINjtlzu024aD4ONQWxt4J9eQ4y

BK/hfXk2jIvVUi8FuIG9uk7uwj/KelVlrZjk8SqEL2
0ynqXmM/bxrIc3KDjsi8HxSyOQ78XFH9HI51/SB/4/T3H+h7gMZcpUK+ys38WqCXSrivDK3kYS8Gm2OD

mwR7ED13/oIPIPwM22AjjgC8aBZgIS1iNlc6nhMXyZzKMNE2dnUowGPUSCTdFn6IzPbUs1gOtj0FnkMH

oskevX9xBZeUkgmCvtNLm77A4Lpn+MJBaZgd3TlRHF
WMcso3h/R6B7mj40298OOdCCC+ybJy9o44kbILHYfwvsIH2QSFoXNh0MgvxcvrvkOrzbTvv5DFlXNjUe

4HN5L+PPk6PAXWbGJTUNAJqgFx1lM0K1wq705TKQdxgel1HVKTc8MjKivhAqTPdeqMIthElWbCHRDycE

LdYvAwwiVgixZd3yMF4UMJAtKN0rtAXsNKWRpcAI1W
5gL7IHBRdGPYV2VaOWUkxU0V0k895vG6GYYnm2QsJlMHGtA4YEc4I+/Kj+HFLENYmH2oWsnM5DLjR5KZ

LT7ud3RRZa1+q6eHOSrQ68Kc9dfbFdQ46TVRlD5Wjb2PNR0PEZr9Tvca+3EYD/CymlR74OtKHB+j3tZs

1zesJ3fGtYZx9Wrr9cjf5tXjJiWNH3jlID2O9y8wj7
5sbiadbbZh3zvqSSd+lbyoYuLElstlEN9hG+bjBCve32nxGgT/MBJJT2AKt0p7+gnknNnJewsgurDb3n

JgTrii6V03wfYZ6qC8fA2D4hNrOB2CatzT64tWxLA0F0MGpTQZMGGcz6aGizhRMbfX3/0L2wVQ80B7zp

dWdLpN2q67CHq1YDeSXTlHLQuUN/r44EdY/U2RG1g+
0dLDRpA+1PFfsYZnxcw+OZEg/qloWT20iyWD3A6T250yXj5pdV8l2WQviiC+0CIrWkbkEg+YQphUu864

gf95QdVBCN9CbEhy7gEanyoTZfG0FNE9WxjhnN9M0eCURVhR4K6/vKMj5bhbNkHzwx4SRASessYietz/

eimx64CbIy3QMmOMk1cVohvEXkEsrYGoPNPGL52swh
262LJbl+4ZOXRsSsmuR6eE/k7EaygVbUbmDZn9okVPYqDeihX12GN80iNL4KhSjmNo34aQTzy7a5+07h

dTjjyB89LnMpYWgVc5Ti1XTLVb/USLq77rkmkCwc8R8xe5e7BBRY0Iy48ncmlbaNyJwcp2SvEo3a5ln7

gCF3jMzLUaaBDVwjTldOALk10zz3XkOhiA6k8qyFai
sbUgjLYVKEFc0MjnjN52G/Myg/uy8OvH0ybZee0FztfY8fYir+b1/0UGg5+k3XSVBxFb5IVHvxcBAXWE

Yl1y1GseQgIdvsbTep/F1KUlCKbegduLfQhy6rqQ78rgCLWv/C+TYzJT04NSxANUVZfAn6DVRG9f5Qf7

MzGe3o4EVCED2qszeEwj0SJVtybPVzgobz7nX7RuxR
2CewuKPA3uxplSBDaMQbby4VLAN0545/brHHI9q57+jZKya+2i6ffrik9LKw8u2YLSj9tF7lo7u9YKQc

YeivlfkI/zzn1n8ifb3506JVikO2epTAOVaSlJuu1ecV1XaKwaP6Nv/u69KQDGGh9GIn9AZnG7ARBh+i

kKxhrUvGmjbHwpHY/xm4ZwGxHKh1ZCfIim+I9v4lOH
oPYGuUftvB7NnuAbFUzVlwq30OTB/tYY/Sw9/itvH9YkKj21Fgje2ysVZOXabC+tzf7/9fO3D4xUG/Wi

09Ariqn8oxT04Q9EEdLoCvguSdHhH+KULcgW8K0KwRX8chnU0CG2WG50hnW5Hf0epF88l/AZ5BAsgwZU

tKG0lw/rdmAG71ox4MolFi+x1UM0wP4Anm+EGvA699
oXBIT7vS2X93bo4MxCqXRhzxFt4CIh2ydwLPGzA7Sb3JsD3Urjxp18yoJecQpS0VYBNNc0JzegA2mOPV

mPJfrgizWDDrKNJYgJnbT/kHhiuEp3fyNm4lYi5UPu7Wt7erguYvX89wYW3Kh2T9DZl9mZKbqd2eAbu+

pKXTNN/CPywPKTDXTBVqtwYI1bgmlYPXPXuzXsKMj3
4/wjw8xO4UKS6uC+pujmzXEnFZKMJCAfXM0a2IY3fViEuFqWrd0wsH/s+sA4nLK+DDKc70nfO4w5+C0e

CKXsMh8ut339qWcoF6Jsl1Q8Wu29DfNoAC+y+I2yXJHQHzC9zwzfzIHtgLvfktsd/mIZ7BPf+AEHwWul

RtzBFmVs2gC36PJ0b+/x57V0uReImttSvI2BuP7EYW
CmKFcwpeRhcnCqiHPGHPRsmizdYwlYX6m29hmrx5D0WK4Ab9TVH4xW1j7SUBFRIiZgve8EJvAKmrWDVi

L9PBN/dj5EqhEcJqOYlCVudYqWFXwv+OKPDiUnqDQGTJZNLuYH90rLI1ooikYOfF/nIFSNNYAYmOIOnL

BKAZxkWjVOXIX8JY+YKr6bqtxM6g+CNFT5ZCA/K156
R00rLc5aVuJfy6wRVjMlBfSu+bqlctAcv8Ij9NdtssmpeUXLXFw/LEsk3gMbPXCh2OvtF4BCOhpgW9N2

m0UOJPvx8MZGz2hjK5M88sBnNbUCgEMjLsGw12ppuSWyr6FJKvmrYyHZK12TS3pQyAn+Yfa0Z86I9Vwb

RqBhr6uzmy5BdVSiOBKoLNqORVxkOeqldgF8Km9H0k
QGG3Y9dO6tiG1qaW/livTc8o5/m+chVAqJaD9CG1n5bQxpI+B4QuXfhw2+VdTcvnmZvuZhFmzwo+U6mJ

SBQqjtpakXV+MnHmS6U0yD8ql06L8uGtrI+yox8bjaMftCJZZIuaeOtbrN2oMYOtsuUDctGuZE+U1Dh3

LHS3a7+4yPpRYvkvgjaJ29kTMJlzpPl1vTL6nJ2LDs
r/vUlaipEFuHfBGD2vaiUzYfqMb0sh22tZSw3lFg7T1sxpND+9pyKP5mF3M3YSG3v81/FYCBkRkjYDQe

vFcuyl0UxFbRXhSZBxwx9/AGi2a/5vtezAlP+IhywQScj+vHUfFs8rRptUJeXAk77N0dK+atrqG0Iks5

pKrLwTQbuUTKNg3tV0ZTY8iwsLBIA5ubL5+wn4Vz8x
VeZYiGipCawdo4SLMtJHceVsNuMl4dqMi0SsSmdppB4of7BqdlM8+teIlkOhOoic/Ft5qIXYLkW50cM9

3YvMtdSG3nfUHctZjOyRs+Yltk9+iWvolZPH+1UA39Iwk5g8CtYPvVjkt40sY5eRZmS+0sbH2isndrdg

XFouOQfkzxnppHedPk3mogTDdOO+dxzCvHiYrAVMA1
HRbgrOGQFTlMuOcTyvDnEADvGakRQqeQ0yPwC1pCzy68XkdxgsVw/kkQ3k2colkkZ2FoHSzuoE0eL9l2

q1E75riygybFfo31yQIBa2p7bAJnMRq16iYMXwsth11g1gugGgNkqtber/QWNwg3CDGfrEYAx5oCi/5s

GLAMIBjd7wfKe50j1oKsG9pXoYXr7gBbSPJakg+6/F
z/zfYyM5pwlfzmjTbqngrvqCEMqL9jRkc9GboX2A1le3AqXNksxbuay+e0OaNekYg1obMjYHJ/ZAkEf0

g/cvFrrkqhjnT/Hi03UxzVp0oaKjqqcAQAdX6W21mS4+cLlG2ikgyFB0f9qJtjI1r0T92O+lpeKg0FUo

Jtk79lrsgwFcWiPGuHWIJUq9peR8ajxWioZVZi/FKj
/YfCBU3KE8tb+Budrylks7P33itngxQC76T4/eIjlb4rAGIiqFn2ATijVZvY5qU4GmoskXxpBnrhqerq

Chava/OD0BNR8xt/3s/8bG/hpqEhrbgbM6sLCqfroW+0GLup5SbS9qdIEqrq5U5FCwwF5vdM9VLdbjMoyJ

MJr859PhXyxJ3K8q7Hdidaf4bzo1mXoOcb2WSpL7k6
p1gRmQ/H00H+hPnot8Me1Ey8kSOwEf9AHkh32asVJa/hXYPJxyzeVviBuQNJLvwQTyYLbZDAWobrKhPC

pFE2jwIzk741ogParLc9X/d/2KkchbuExPfMWjGJ784WWl6BEV17Ah+xmCG0nbRhHMepeTzqx3KavDYC

9GdwEkvH7LWVq5zryf1GRog5nRk9fn/H/uo3VkLwFJ
uaycufUs6SNFx3Ae9ftixulFUaL8FedOocnOczk5+Db6yFX4Kf57UmvamL8XfYJ7ec9FxqrTwt6/s3+N

sLlnFXzXwGNg2kv+K5iU3GaP/tIcFH0DlbACvJqMNxe+I28WkbgAGaL4vDrzkt1BJtJErADI0tPzB9qo

F2nNNllLWixW7/EjHPZ2Pd3P8/lJ8OokioPFDugq4Z
3oQN7QhU+YM7lC03k+VGpd0cJ9PM7f4ApD5IFK/244sTFldB9ql6bD4AF6wdtsw93t28FAPLyiSZYGEG

WvH/Hssj0oW168FZm5k6emCCeM4SgKtlpc3CrM1h5xpibcslQK1i7pmJF6Ju7KUfTwA9oN2/9XuBAj2X

yi0w82PPSsY+kX5Dr1I0d/MZpfa+j/4+zmiLyOTp5K
JMY1UNaaPXfgoScmRlkatkD0G6SH+RJHWtolluPSSwZd6mDDjkK333KI1xcPLlnEGagk5+YdfRKCbX+t

YVPXLtGS/XHSpIAMItOVt4bHEk/uOV6xAx2lTJ/C668oUdzZ5MwUfHIwedk5nOEL6OCdNVzWRqNfMpZa

VdV/Q9emzgD9iuk66yz0JA0YfczkRFs/s1FxIBfoFH
03naLTaTggod/L3wFpHoo1dJh0QTj+UmMH7P8lNZonOuH8ZFuUTii5q5Q9w1hfz+iUTsQC0OsIZfCAnA

4ArsWzQ0SyFdkmPZ+dLQZWAQljAL8Mx2HCbndcRPPT2DYXWRCLOZ/juFXovs9C48i67/pVdFs/ORqZx8

ClrpPKe6TEB8GpLBfPgCHkbTjRwgHKYEQM/WaGfJv3
147FSrdJ6qC6D1nezhmJ9vMDxj1phFH3hmKFgbG8aTsf72beiEGP3zqcQgI3Yk+Ld5O84ox0PLx497ex

YkgWAj97Jdm1SsQ/WGpm6jtNaaPg/jLd+MvPTvSYUIJr+lLbsBiGG6MXHNA7Qc/uhTAvMeAR8vFOcXbo

6AtdqlSZF7sJkskVBLKSWFEJZ9EclipevHt/usQnGg
bAx5nN+c4PKzqr7cotfymOtKtaGijuIqXnks1y5yNFjsP9fE9AorlS/8K03mg9IMwuWQWqe9++M8HQIq

j7z0Fj5MMxAXHhOqpQhFWsu8mwrXIWOaZljW5eqx74X7aoDDXSptg/7dnyzCRDRyvfZKs0Z83/qn6gbJ

5yE6NfiHVHEQY00FVN6wL+40ePoDPFluQiWf/ws4j+
2od6Y1c2RYCsNVgmrQx5pLXzf+pEDxo5sbfqwRpDW/UgqvCol2PdZp7EjSGvuZwomZcLVRq7A50cLT35

M0nS4iauIkZnIgmnj/1MOeA4DEsN7UtfNjHxkUEgEHIIT6nycNehNouS2u0a0RjbVVXgKbkl1xmGjCv/

+5+51esiczN4LUe3Ys+KJb5X78nxyvauFIWqhZ0jsO
DFOCgsA4yO/yzPAMOlaoshcBHEZnl2Bm1iHfxdWuGpAggST+cBRaqzpw64s8OPimGseTOcnq7ocWacH1

9ZD/SYA/wrkHEUqOYWiY6be/PrX7frbKmOUU5m2ipWKLVh29pK4DTith/sp4eZv1KhPnhyHIeXcuPAcJ

OEYZC3Ox56b5SZBNcraXwfNS3KR2eVjq1ui+tTcG13
yg0CUAtx196Wiw2fDl34jSa9alYUwFoz7F9unK/HMCzib8gtIEb+92qzC36XLmtMqZ97gPhIfYGdR4w2

7wvpGf4mzD2beclCXUigeBCdliVG+olvGQWZE4kC9s/EmOU19ITQl0qb4zMDrkDLo3Mvwaj3+qymF4aW

rE7eJiqvyRx3ToSzXsSJnW27f+RjeZW7QXR0DDKCZI
672xCR8H/pz7tsIzHdaB+hP31hKuIkmL2TWNlmflaDVP/q13julmwIe49iir281n14TGrnfQ8qDDZW5j

Oie/sgnqMJ6bOzu3QyhAOUzuykvCfAXipYMNIH/mvplHLtY/PYhrI8onlvW2luuCIe2ATV7YlJFYT0Gm

uKGPw3i6f6W83lpDWN0dpeTHpsKjOpYiiAIunPaLNt
dXIGL6ml5GeNyn7sDUFcfwNSJ4PKkvJhyWcILkF42riVeVKqm8mzPgjrDpLkHFTG2/uuvNEvJttXXUg2

nfTHpNEfvcCPGvPLp0LyoERzs5GXbS5o/KoUwiG7FA0YkiVQXPhiX+vFUNFFcIXjP23wEIdQi4GxTZmP

KwbQvywbkFMmuhe+X8jETzBMAuAABjYmVcw9qcWWk8
Zm9HB6CLzkDY293uL6aSLBlFK2V5dhPKZeL9VhDGBlhJGXWhhh3E/bXE2H7M/78Ibu2xzEjaJKwRBrZu

f9yplbq8Id4FhDzadarvNOc3PB61eWU+S8/fqW1BIHYlf1dz5ubA4tLlrMa/n5ji0iy3LKo5jRqFcD4L

iZuELmKYVxdLiZyZdwnHQi1Y/Snc/on4jxPogEAslf
eUk8Rr0TCk6+CWN2jQ/VyHRL20KRLS1jGKLQv7F6OkSYj1u4/ChEdBfsmbPQBaYgueso/KWKyZ7yTCAo

WZZHCGQu3fkqkTJ3ZI/P+wkC6aKYTJ95tRZoG3i+LG7GrEtaPbj93IohtYdeDu4yaBmAkh+jN24FV57M

7MPXn+LK2NbMObq8jQHup+F/xUXoY/93sBvgtcsOLu
o2+z4Ceq9bqghX40yAP/Awa+3bs7jqwYE0oXjz8B8MTAHFLaNKBr9OObI7bMAmnyn7c/Y+2q8DHPQy5i

nLxOcH1y5dxzCeoBOoWkuR5k8vFVA2/HuZZCrLpwqxQaIiSoAFe/D6qAZ9CtEt7j/7MlftPtlmgFRx7V

PC+TioAuLWA3HoMfI25fKfCaHtTXGLG1s4MEWsHWBX
0xL+WztwcZI+GUb4zen2L33eBdtI3Z519ybQ6KsXEK2aGg1fimlTgQzqatoB96jb6XvavbugwcYj722V

lYJeygTB9VP9yZVCfWRqkHkT3iScHJ2RRTICfa7t0FUm37qw6gTj7UZ70lyHE8m7+OConIDwo9BjzzG5

kwKptiL39lDs7tKxXI74C3V+xukn9OjCNgiAEwxJY8
N2ja0v4AyRpiDmFt4n4bWp/x+lRkLXYMtlM0aXS5vhaxlU2kq5TnNiOJnVuao4nihHcFEuYYSGIOvv2j

EDu0CmKujKZKJB0nCPBFS9mq+L3xebAJrvGlttzkAxUCHhdRWWJ0g0q1qc+/NL06b43PFv8hxSrRRlj6

JEHfxMkMUNmSod3NwqaAOTAMLXyMKQHo3SIp/T6j73
u6DWkISaFiWCbQaqKExFw3hkA8NbooGxuWxKs36ff+HkXlsQZ2TmUJeefKGizKGgZ+RTOHed8meHQzty

g+AOh5nnY97C/RLvWV1mempar4x2zUZFLREEd8wFn5erBmhRs+xzABWIQYfzfUjk8nvvkCqofdinjcUn

H1WMD493MF6PTwK019mO1PbD0RqfExQlU7NlhwFwzY
SgeklDiKSSYlOe1zm/sWb3clGSMg4pc/W4kVMoIZb9UbCc7Ao/9rrUEylzFCiJPkN1/5t+gUNoW1cnO/

JKbcV9NMFNbJeTmoi8o5Ug76jkBtFOwIZMBbeNVOtB6Bo1J32D9Jdl8TrWrW+geh0EWsrIz0DxsqGbS4

wfb1PX7jd33eAjRFGFIxLzYpbT34wb9Ky2DCKDwW6D
rByhuCgsUw9x6kIih+q7pLpn9kdJ6gW6PwJAIvRc2egNYNTTfVNsy2JaXRBxeuJlxoLQ8GZu2bk2EUD2

4OHe7GQ+x638qnZLdXxZj73oPDBlf0Rtqn/zek6GuHrrqLkw5/iRR9kaC86pUaJOOITyJS9Klrf5wIox

Oz1ZQcCL0QbVug4IwpQpGJ5ecFu7NItq8zRjxC39GQ
7k6ZM9DpF6CkgWQt1lD05D7ZPffDiGZDKkuiOilig3YeE/5HXPnVnc5XEtpyCUtV/Da0V+VmH7uQIZns

OScpkkOjtlb22L5n5rhOVeYQ3eXhPCnuGBdWu10z5ZSCVW+azi5SPtYkgH8QSVMXNRnhhXif83Hhskjo

RV6bW9618/rUpR55ZfDNlRbUPa0ZO4R/P+sa55yqet
Dl8NohxYx8G4R4JAb4bLsVTR9r+Bj4KaaICoaZEf4koeOZNpl80wGv8wc7p0sJoPW1HqGOtPShqErqpT

4sHT4fff6gAepjq18BYH/7sSUqvHNjxPxES21rzCgfhfhC1op+wX+kpg+OVXOJjMQPwmuYnNNMRtc7sO

oLTkn94aQi9CH6bSIHb0PPO/7ykE/UZtZNVqtMu/zA
kwmxRn7I24TpadjUBjkPVmh5Z+stBHEF7nXnWmIaSdJR8erBwAfFlW9gaMHy4CqnKZYiNrrAyWyHqmE3

qhNJTS9LTsL1G2mu+O1JO9aGQ7WemLvqLj6YmExivoQs/hf15Own2htR/QkXccRPPCN9PFkzs/oXcqJn

bvq2o1jbI76ZyjiMUB7hg3yrSIpp6s9oOjRqV25sNn
XMWKS7l+R52MDcnKxZRdGmwVIt7mn0eGqBxWsYPBCJYbJjXTsbYOTsv2FCtXsCTeK+smuF9e1w+M+CZo

xaVtZbO7gjRv6/tLdfqwwSmJqs7m4vDApmALZMls8bVzEwkOUyarBTUn2QjWWMnU/erYbjznPRK1REr4

bazYQg/uurwXXriSUty/eUx93iRSnCGtORonrG7d/5
h0TjhsHWFOp2dXfspvonu+gqSyQVDUf1+rE5NWvpRsnW3tcqdHeuySwq6XNg2SS/CheWKoPxo5uUJPWc

xUGx0pvS57wuYnU0FVnVrT54SB/4Eej717HhxMlnua8aDDStdGgIF75OPVlwCCVS2Zi5SgGtgXlqoO/R

7NdTqeRwiEPaTXva5sDdn7iXR7Ur92GYJ/T/Id4dMr
Zs6oRz68Xin44vglkAJS4pPiwyMOr4d7I69Z/b969fkkG7IDS+xIEg6CKHV0ZKhGks7+9ebDlJkTaezi

0Fe2Ei4K+v+1Dpdm8YauvEDeyTnfC866NjjT6ARD4J0TY3br9Wfst1Op4xMZA5y5Rp3bwlQHkxPW9HqW

H5HP8w2r5l+I2ODOFSF4NtZWgvXjtR+TaLtxrQKxPT
UjS97ZFT2Z9f2iENkwdnahuRtEbswA/uZsVah8TvCMXMJlPYtFOH+09dNjTeq19QRC8dwz2Blb3XCeZv

aV1bT/jnHm8w828X5Z4UbF+N4ogCvWpSnmV2WgxtL+hLNmm1TOuWggid3b6UfQhZDQIQvjjvecyG+uQK

zmJreyfJDvE2SzcKhrhWJURcDEyJJKbaFaaigr06J6
ioEKB0XcSnvPHMBNIL1HxKMwQ5pIkyeT3xLvxN5YdnQo8/zt0/dOuP0cyCzSo0+iAXG3SNRlHaEP078v

ONJER042MxR1KTPRMKelgUikAYc5OWcMs+q+Uo9WOqTd/N+WPW0sCpuSWRNH75YK5qxaroQotJ8o6l2Y

/uZ3oIP8IinxmO/xma/4kA5xjKVFvjPGmTtJQW6iJP
gZ6l4K/EdcPT3sQTA+VZEGn42/mthRIze+iS+7E9/sWEiTql78+rrPyFNMIEewnWb8C7tj55223Z0eVd

WvGJEwiQsBVnGC9nwjUSTQhb144KLWBiVFy8ukH9iAL3TLWVLi5VtVXwq4qYg5qATASNo01Zj7oqXsh8

xeAAMI1R7Pmn77xUMCwmVMWueG6NYpiBabMmIn1OHL
++d2v4mRGMS62HitqYKFQLLC7GxaJapjaeJ6+VYMBTOMgVxu5NKn45oBtWUNP/et+0WtNm1WQHFxX8m6

vvcJK93hEJY/vyDSxE3ieKRjIw/MO+/708l5ZUxPt2hf6QOn93T0c7qsGnUnQLcMANHoyz50JuZGa010

k/TU5pbWxkY0klUSwVnK2sa5tj/O3C5octcpjg/fTp
PB+MOQgd8zHr51jdQOWMhinPc0xcVGzw3SvI9Eo69zQ6j2IlEHZUw14meHYI1SnQPo6LM3a535UbWRkf

HOcg51/YGCxAu9pcnncgl0X93RmB2DRD2X73BGYPIpMwLozTi+6viUFMz83xi3uTcijHaDjVNpulEcoT

za9UJq2f0qz+eqDUAM5D0/Tq6CK+RAK5qH5dRyrjgG
vf3qyAz+WCgdhYm5fp6Xzq5Rgm/ju7ea2eeMYlXvr5BZxKPzYOt6WCHK7DhOnUhY8v4ChpWPcIHFTr/R

HV+0UzNsFK7UpOX9ZGKYsFh98Hhxpbki9LwjBmOg6BgiksrubqYe8ZJqprwe5JK2stzsBZHXLUdDSITT

JkDBbZD8ClopR+UtXyu2tYdpYAnBzovqPVvFZT6Ove
+qoiiildM2nSlXKeKq4UXra8Uk2WyPlX4olynWvD7WJ8L7A9glQQAw1zpoEZZrtpVdZFrBxcukeljsMH

J+1t5NAKglY/OMBIjzMFm1/sKs+P4q6JKP/3bD7JlB85PGTmb91PihCuBhTbDhOFaA5363qpkQxy2dfh

Na/io4z3p+R4Qk1zGbUYR3FWTjwqxrux/okVUxz2oG
z7H13nj7Yby5xIBERN23RH1VEASeKye2bzHXlnhjgsISlwngguIFE7A2EwlYR2Rj+ZaL27GaTM2z3D1M

XtFT05XNnJ/hfQplxDIaC2KyKEymMXGtHUrYNM431PPT2LngJwbE4rV7UWjjdolLUkSi09NxHkEbip8c

6zqiUfPE6vjt3Qj4P0OuWEqvCXqfr73d8C/0RPk1+p
qitLDZSzb25hJoFiT8tmE4ofUzWWnMlmGwq5Xc0ACCPm/4PoJCR60dRxzCO/O/PIGJr/Jvr1xejrQB9R

xsZwO6U6+QwDHTI1L6RSHqadCYvznf/hJgsP7garonBcbDuGTTGdcPNfhIYAIyYJxK2gl1BZsEUQrjS3

D1OIJ1yU/sLmmw1dan339J3YU7A+XTGL7tJnn97qw+
anUhJSn1v4R3F9gG1VLJNF7I31Wp+qgHMjN82aNfhePuIcljzMmH4F/PMUiL7NI3XinawKtYYo4A0tyP

nV2VmpwjrKztLRgsO73+B1J7BH1sCtGMq+BainAvKPHSFmNkG7WGvNSmjN//NTBzYt6g/ijtHuKpSf5L

JeFrise0QM54X+WpAy2Lh4r+A/q9CQ4hNpce5kUyM+
u9P+Ee5f/M68Zpo8AuSGPmpRgnULDch0yAVtZXiqQQPKspUwiwt0n9sGEdxjeQk6LvbPNM4Iz9fO4CmV

hR/ko63duzaYUZP5wXgLpe8Eyu3CRqpbtejXFYFxVKu7LwT1YOMoWrqQ4gFG34WwNIGJ2KJqIirbEBN+

Xvcf4WSA0B1OTEQ+ek0t4yf4uLTFq3TY/IJQN6RtEy
ZwQXXCmCeD4GcOSnnmDX1RzeuCNvIK7MwETsP7QVSizq2sT4+i5DKNqVqZi47WXmMu7xUlrrk/aNdfzP

Ozs1uE3507KVYHRytAuUSSp2h4Dh/m/8h1NOhcTTjrETlTRukP6S95XqPjhnbwW5q83deHsvheJgjI8p

7AA0Se5G0KmaOmZxK2xZmlc224ihvomX9ot/atE96v
PRIzqP2rvpQ0stcxVViO2ine0WYba0zlrOeOcPQFz3d6YFlWlPticy1kGqYy0z40228pDRTRizxmSepV

9daLlFzLxy2X8/v4SJF7QAsQstC5YuyhVj6dy0eO5wgaHwTm8fawwWoTcnwL/AM6+06lxv6xjfAJnjM5

Z3CmMpV6CyXKeiimcjuSscQ2AJSSZJwXtqqTaVoBLk
dAoT2g9UYN61wgCUUL/0SGj7naAPtKXcBSEhSIec3SFcrCniHBH3c4ZwMCMPtCHB+yIg/5itDHUKRLGJ

dTcOd5yJJMOv297LCkch28YgWiRVs23i7qC93FM1bn//i2XDHDAMm2MQIVAK6pUTdScn6r20qgpCN++d

pmn1OkbTDbjOv4M/1IkF9MK8XDBZEW9u7sspZaHOdp
jJqi0fx2l1ITp84zcqkJpjUEjHSn1X3dqLL6hL8y7ulNZ1Zv7vBlz5Bh0MgfyelFHka70xEaTC6ITHwv

U2ne9etgdCUJQHYtT4iVLJuNJVBpr/Me+yLCq2m+Cn4rc69bQqF6VHDy32qJSlsO+MVZ45+G1SXvS3sf

dJ17+FBLyQW6PE1aOK4//kBxGlXqfK1W8PPXVOV4bo
YG81/ZqmWIER610jEq9eofYSJua2JPLCSGnicVpUw84sBy/6SqA+gqSi4oUJYwCQoxzmo6C96d52t51j

O7oPgMskpw/t+r8f/LH6EQJzfDY0rUFz1Ka4qsbzXi+S9b+SvVv2uAoFzK0TjjLWWlhACwbTmdZXo+6T

Q5AZKwYz4FuqR+C1QmllXY53ISWdXz1qvstKGop/3G
+UIHXymb4f4mADqy2En6zyDSmhAW/z4H5IgrKnZonnrVgf/j/7jt3sYxUlTBCz6R42lz2Dv100gxCURW

96BaFdmjXQM6I9ao4jp2P7vAnsaNrDKUUU/dhaVOH91AGKVETAtfFDAKSrGdAuL8Si29q4RPku4AGVdb

RHbBS8t7jmvkIFv18c52BOXsykar3e1nz0QtGfkPgZ
hH3YNQR6ImURxXRKZibiDj09rj32UgxH5MpxmSPjG+6/WeQXrsv5Ojs+pXxB0OUBEhUdkAsPXpJTqbgA

ZZrQcgbm0wmbb0Gmw9IyoGJ7Fd21e7wo4BA3NPQ5x2aUx6OTf6QjFY0yqCcnK9ilRnuG7He/w48S0J4B

k0dC3KLnxOtIS8ly9eqCF2uDl+xithtSAZHuFQDZbk
A7o1XHYzwqa4LNczWI1YACb3LIuK3NLpIqXpxJ7ZzuVk618Wq6qBIu6M66jmdvqxqoMYp8vvbNPqkZ4x

rs1xL6QTL/iGx9sR8iZCFbX29KEYLcyCPDwP7ibVdEKXvYdxexfEEe5zTBU7zqHYo3djcakmzF4LBAMD

7lHbIDvDEaGxUWYfnWwftNClieRbFaJQgc+Vj5NfSm
vHHVERc5/PrIie686Dn2KeApQa5iDJspHj0HzEyl3pH/Q14I3yNdvwPM+3lUv59i79u7ixyZZbHSU/sf

pEAOHXDvejAuKIzj6BANLFvaJwzvcOxROGA8Ryx2wiYgwgSLyjPVddTzrJvBynBLu86bws6t5O707u17

+JsLw7pGl6N/py7bu38vx33pM4OTOtNxOLHdXIRejO
5BMSSqwFGyW30GWNClxnNQ3hslcmBOaSgAQUZzE9e0Cecy0M+0fFSqfisigNe1EbIaMHWNncxotG+1Qu

IN+hl3xMvl6RknWu/3zzLYLT9nQ844DWM1ZJHIX6WJYQFdN8VNW/2lv2RSvk9IMZ/rXyPvZvWu8UNzph

OW2a2yr07SggnSPQj0QBFddyPx6+kSkpKqbzJ39MBg
oGLX0M077cdoZvWwpnMtdeL76d1rPD2NNzNt0XRYPe7aR7z8bEcTYA9LTdK7t4PCR3CBaInMOavCiWYv

Z0yfd2mU9nEbphqZJqE3bQXWJHhE9U+MFrAXayTA71CA/XZHNBS9VjRm1IBurjhbjCsY3njSAnzF7B0p

DOXzpkZ+Jwt74e+lijwz5E4mnFfGH+L/ufGCNriiqx
viSvLmhHYPuXTgBupuUVEdu3WXwBHlMJgwhNM8M6zKwN1cwC06igEuflJV+/qAH0SJHtFAgpUllGDo5N

EYu2CqFtjzPSLUd7e9YAZXka35obSlzC9Qg9L29hBrzQeJY7SRP//x65/lpsq9tvWg2Z2lp2BacjErox

Pk2TN6pv1m++zx8L/wTGhnLiwBWEnmCdHNN35I9l0o
it8lWhwSfdYNJ61US9oZ5SWV7Bx6aa2IY17U7tUChgPevZcmQlAAnNNXhHJ5nAk01havl/m1MuAkBINJ

xyH0T68D8hPEVK7u4Gk8iYxcosgrwpJXWVPMTIS1fvjDplRjmbpvpaUe79C3jc45aZwOSmEnJPogd6O1

HEehlHUUt/L9FJfjNPOHR897FHQBsYt9N6wUGA+aJV
zucWTkOhROFcNb/1mNdC235DCq6fK2rxsWI/8MIZRh5WgDLTAfKeknDdeazaSQV1fPXMvXgjBRUpEJdD

YyoekwhvZC7KP9+xD5Y0QzQM70OSgLAthRBvg00IpTOqi9G55Nm2Xk3s9qoEWkeKT23wtiuBfNGUXbul

RNuJjJ3Z+6I53twfa4T9oJC/s+jlpVWgvAu+R8J/tm
wsxoFntYKF3Z8MAHzhvW+tDZRs1uSLeFnYu86pLtSOZwGTo+w4BB1ITPlh8G7XQiyP/ogjtmmKLxJbTz

iBySLD9QALEb3VGmIzcTukfnRLbY7OpvkNDPkB8xhT6jHNgWWmnU8kGrIfUJWdu5AsYbT9QvUI7lQ+w0

HwFJy0Oj2gTwzbLb/lHYnCoF4yv5GCTi9KjKkmUESQ
AuAcXK9u5lMLK9tuZSROGQgfMw1duDPxyIrQSwqpCEDoqF/33X/axio1BzREQ2Mjizw/nF3xr6BgaZ54

REUwtkyMGfqm5VRLmUrjpV0ogKcRR0jm9GjAObAsPEETpRjVx9//DXbQR7G94c+cAuvIcSUUD20Fn+Nu

Ao42wpzM080HCjPaiR+jHURsqj5VfdZAgxlwdW/eei
D1IXiraUM4Nkcu/nglgdu2OJqd2UmDfmGNBsI/5LspYRPMM/G/Q41wr3Bi9N9/LhnAtnqbj5daq5CWVE

haGwug/3+1JADQ7BBDj71SXgw4nPh3VxOfYThIxSTBOVgTUlD/kLXb/zesQx+3msIv15mGI0HxFbrW/4

rYN1e7kkPZ9/imm8k4pwaH+TKB7UJr6Rg5da7hIItH
ELqF2gSndkXklJaLTAn3LuYUv9yszqOlok+32rl6XFCTC3q5dhYDTNBDBRCm5F2jXzBoAp8JTUQnkJUX

Ui/4Yaf6BTM7jkz/sdLog4pOBM5yZ61OimWt308576fCbEul1ZtD7Se/5qRlyIjtFo6+2tN0LFuCRlvv

uulY4+PlDl0RvGPcTk0LSnYYGGkq7JpD1XqviIH5yw
J6gXSqYJHbCI1mxtUZkscVpYkCLZbyGV7EMKh2ad5Cn7lHc6AiYGrvCMmxlojcWojTt9dUy+IW/tH3LA

lrNfFIrQh1jNRwQdzAHiETtdkzof7C7+JVNJU+p2CJmXcUmH+LEq5cedkHIR3fqLq7HXpgggswhzGSuL

JpGJdu954PSdXGE+qytucnZwGdq5iNy+20F3ZTbtsN
Mb69Uyf6jvKLhP30U9aSY999RhkByD1wWacAt2HpnYBEscb3PPy+JNYUmUTj8ootS7FZeQCxbr3jBNE6

pMvjkZxp3H0oura9R3FpezLPJjdKLfr+H2s+wrYuXJj5wEZZmsMUzVu+OCTDR8Mou6vrl+ABrjg/BDHE

eNWK0M3wHDS85VV59zqlNh0bDdF77tS5z5yrhiMgJo
9xE2JM9DvcXucPPtcLI/5aTI4XxsAsTMVGJLQcBJglvr6+Gt3KV7uGyEHgnpV5dfw0pyf+t/nTdYSzlJ

GflvuLn8jQ7RDf6fzD27Pdg4sfzop/cyTQKz/FwHl+xcXcuXfMxlcgimYHhNC9gZyOJox1BVY+ueJD1o

PSo8FYYmeuz8baCue/bEJKJwF9Bf0MuAxYmainSnqF
73zsehv2IVebS7oVh2Rj5ghYaw85MGA8jMD/vnGq0jBAHItg7WiAqTxclkJlRr5smd+EEiErlxo7AUiA

J1cnVLDljAMj7IyfdKMxz02c0Va/zltVB/JEYu2HYKg70gf/9heLrsQJT2kd+xLSzzwYo32GUHFU7tR/

QNPgn6aa90Dof8++NMRU71hoVySVaHwMz8XlDlK9j9
LNng+Vav1fDkxpMKRIflhmz8BDJhavn21S3iCGEwYIMUZErdSG0p9bwmSyNmTpTS9m2epxr825j32pNK

p5licnJvXxCL1UiliqE+RVwDbkZtfinmVwT2EWc9Pqvx7fz6fnPNrW0hfHrwK3qAV3F2boCMIU+oQdeI

tR1D75h5JKA9vGG4tdHUMpUz0IeZrM5Zg/BPPHYY5Y
np0ynqgPIHYdggza3bSDB+9kQKLzi2dWxOIplYG9S4kfMsZjfTlJxk2x252SvEh+avjAx09GhHD1GQyl

ViyAfJjamRH7P4EP4icVv+XEK4wC7iP/4oE6PhK9qylS+1Rtbs67TuVwiQP7aMQ8GqliedcgT6oSbJvc

pnIFdr7nT7RVQ7Mh/9482QxiHnxUgU0MC/Nn1coD9X
prcxMG1zpotz18anPyUue7u0SZMxUjmav4ZHxSZZua3TlQXW8c3d4cIwajPMqImbWGLYrqR3H0VbJLdY

EpWE2fzsEGibf4wKacueIup4FIe9cvG91wmlFN7yJAgUv5GNkDIYQ44GJhZPRP/36uGEWU43+ymakpNK

rth27bHf9slmuk4Sj6gmxNbVJTvhAQCz8VJnkIr9gz
+q+NGTTkMcmSFET+dKomGZQdq1vsUKEgDyDjOyFSqAl7L+h0M0TiYJPzim0bJ/YWc1MKKMM5HFMxQqIi

rSmBGvzJaeVwzeqN8yLzzHQvSOizt/V9GzHB0+/KtDkoze7ICTW+W2ZBpJ/rFM4C3upjtoYak4/olhV1

nMX0kTOY4tSCRg8ie2abUKIyOrR7ekDL/jhN7qSaC9
VKO8IA/mILGYSZiB1bN3sYTy2D67dibLCM+NbauWnFhHH272YfSpekTWi95+4m9WKm8l1b6fgUbaxhJp

a4UgL4+XNZMlQFKbCprG1xObu8M/+I0cPL3dyz/e22dh8dUaQRFvDmMlU4/PaCaIpQf65oszvH4n0vEa

teIBhDwt2Ep5FkRZcrJs1HCpurltuVH+J+veTDJyuc
P7oanJMYN5le9i0cH5cKKitvRTavT+odMyhxQ65FYYwYqkfKI9vPQ79r8C6KT8g0Fg1uy9PKNch1Ha94

3zunCn/FJ7ETrKhzvruRU6zYt9Cfh3EzU3DB5lbRAltgwgHaEpuxAZyQuqa6yAO0xFTZl0wP11QLs4Sh

ilWOxo0OxwIUGDeatv1qVQpheT4Wxj2Ducxi48/w0j
8FbCnsjdPdJbHiq+ukhmsGkWh/N06yIWUFn41+Jqu9CNub800s8NjBlrtgoKmmezYZ0WnaEX4FwVLV09

2ksmhVP3XcrD50Lt5NrStUZ4aXds5AuJpZhIHAAIUZvUpWyK84HHcZXM+xC2Z4gZLlfLVdrPL26mA+Tb

gzQIPpkOU74XJh0zW8tq11/rerOdpZ8t62llCqLOdb
1A1HpH+9vvz5iwaEteZJ8005ZTQVKzAhEpjewpni7u44i3ZBySATG2aq3ejPARtD3G+dL9F0k+Ate8eC

ahLluIsbN4JHNQbfJwBQJDS2RSeh0gVnLX4M7BasobFaFKXycESIpxy8VVgJK61cBUSWv0bt0//2uCXb

T+fsHilv9Ahpmvfzh5dZtsS0n4fd2r6K2Na5MfRh1e
7yEPVTfupF8h6ydp2D3+SPTWf3FcTWGo6DOj+Fe3ruowcoH0N/01a4hXqzdM6SRN+lXkosW91uWLJWJr

FkT3wqDSQb8DDNiqfTm6dljSE2ITZ8UCbC9pSeeTDl/kB6ZCh99Bnz16QBe+xchnPCn88i4Z5BnqlWFf

oELRwJKWqbGjLV6MAfcVlyBXU9Lm3DnvJdu8T96gN6
KutrBCMQFbsrEHk+SbBwyuRkYelHcx4WNjRInm7DOsCU0sPpzhoy/kIrLeICv7BTdxJZ/4G96Rd2iBR8

gwBabSh6DLdXhpoq2rQ1BoigfjEmeakTCQPmZN2fSbv4GaCHbrPZAGdiFA7ENDCrsvBq/tQ3bvExRSY9

LI0IXSW7XOni2t+sgMH0s/RVZ6aOci3TlCRecx3ZVg
wWTYV1XJ2w9/Uxdt9ZPvckMBByeya2VAQc1b92/CdM4UqL7MxVIhDvq37laQBMI4Jwipbxbvnoygk/LY

+MZWPBzhH3TTsrJ5+alRnQbilk/PB2kTejGKO+WAXiSCIhwKrEskv/HB27WIhW+iEttHmMCeIljX6pZC

Iv6I/stephania+CMdawdcu30mntW6Z24C9zXQsUQZ42G8En
/UAPsqnj9e3u7aqk0tIuJhlAuKpAKfHyKCh19uOzRvxu1LPNQOZs67xlCUb+O6ulPofBKJeckzvNBk+A

dtRzQsF4bVpfb/6xl5cQf+rNEsnPmm4AXHzlBLYk/f1woXVhWAMjoiM3UJheYqgXgKl3NnvoC2kRBOpX

wsxzvItfd5O2vYk0lmcA1OTmRL+ngCRKLjtFPnMJlW
67PIPKCJy16CgOjDTJ0srUvYLz3vwzl/LiXkM1H1zAVP8SfIDFGaNe8ub8P8rdBRKwfljXz6mbaD3I1A

Av2udUco/8c6JZvlYsmIXHBNEKg+CP/vqrEsrTaQ7AqfnlWHl94GsokJF8QXsz7VDavj4RUkGkZ74RXs

YLWOQuSz86bbADqEeE4SlPvg2+6/di98o3puP9O0oz
MeV6TIEK7F2gkBhzdZgTMLW5O0sLBiFwmt7fO6g633cXslyuH81JPbavXu5aqZyLf5F8/QOpK10oSyD5

8g+R7B+YcvbcBeL9MbOLOG32R7KrOUNXjlXpZE6zSZ7WtAFPWxFb7vEc/OztM5znk5yfyHIE9GUWHP4N

y6h17SdngsjBqac0gloXLoo4tHsCnpDbqaw/OGDZSL
pf+dYP6xHnNkN/oPe+7O58pwP2KZnTOFlbYR1gs4WaqIzQDmzsaU2OfHqop6+gjzIs5CeVIcFk+vuVOG

/0yEpEyPzcMnjCH0OO2niow03NYH6EUmcB239dexZzivZQTnz3tX7jKq5f/nMlI699vh4U8l1/Z3o6kH

toGF+VXrb/vzwN04r9MxLeBmDq/TXetGKHDUBy1H3N
9PeOjtCZEYMdRh9heC4ldCz8ORJeradsxdgXONwW3Sh/D/1jDOf2M66hs3C+9aF4eJt1OiFvdPXS5lWy

ZhFa2X3jaQbw7uH8QfqOZo0cfU1jAvn1fnIuNyrYEfKw84XpaDZFXISzMUE7Y64kIlppYbV/jX1lbAnL

8NUSXFeSBgCuAr0OEhbTVHSbFcVOF6fE5zh+0lBFi5
9aSv/5vMTN8Uj+2mvBwL3a6Cn+/2r3FWYN95lFcDBX2ruGS0pHFYlUKkaGK0BZGysh8ovXdAHYl7VB7z

VYSTxqHEdaC6l66kmTg9qa2kZuY/khFXaUAXvT21TiqG42JU1jUOBfcj74FJcYo+Tl9u+7uV5IfwdJO+

kpUOr0TgsRJT+Bpsk2QJVFudP1bdIWosH3JMZLdFJn
bVtZEL69hnRb6dnnwwMe5w0OvU3/f3cGuqJSNSrHB9sHKqoQiQA/ZDe5h8N1x6CClcSIsHUcLyCTPvkx

7+iqAAXeQxOKxZ6FUB7sYSAHjcGcZ5y9VTW/rMIU/tm4r7i3I58fVDtd3b5YV9XjHB2iC89mVguOMsQc

/9QafAEukSdUCQdA9ZbXBY+fPb8W0Bt76bTVxFw/Kn
2wEcvHxvoOHaAomJ0IwOvm3jD3SPYBDA4sMrVfF6/UOronQRoh5OGbeGSDULNpwbU0it9oh3yHLLGNn+

zc41dLKY5Q+mjuMW84fDV8aYGuo+3sgExGqWNdMCMM5B60scaqqyAVaQRI7OyZKZC9U5TN3SD7if7Q4L

o6Uy8GAi33wIXlHwsiDQVGXI2Bq3ufoy0x4keIIyMX
X4ICeaNKBPuDg/wpu/sIN8UIwGPpWyYlzp0kc/bv9PJD9JYsiJaF444xsjKcbjr+vVA8mYiw477vpbJs

pgdqVJQwZy07ozbgf/iWIKEw8viMk96Yh/77canTYXRnHHzmiK3KcOUxMRzwc6lEDDfeDOrLxiBe3bRV

+oYIVf+0INEv5Xu2H1tiJ563pTj5rQUljF5pDhCd5/
uyBs1Wseo2kmDyeC2ljDV5bgUy1IH/RgnQ7n+ghBZ7Rr0n1HuMER1RgPruIR0qoVbifTyJnSEcqi5+w4

oFVN1mQ0oVocM5KD1YWZtWTU2OYF+nbaIO6od/05eGM8gvaQWhX7gB+i3fSEEOYvHJlR6dtj1ym3tam/

jiuEo7Ftxuo7gY/sSUGCU+S9+LCumM4K3YaxtLllno
X2kiAD4dRGPKDH0R4+KaLnRCUZdYuZPmYhOUQ1nZvbCxUHtrGRfzB2pgwh9PTgLCG/MA5SgY2T+cecm+

5qc6UUw/oJocmjfrU3NvJbns+9vDk5xgMCmlARgaOtReYEL9Jk0J2jGRvmGeOreQ3pCT0dREKY2howW3

i+6+CxhDkfaTMeFEF3e+7v8KHVZ5mkf3ZxUxJ5cRJC
L51URku2QZ9U3isvZIPy96yRYLhNpLfWqc+PKr+/VKQA+q9mxHnD7OoBXOJ0YqZSMcDRBAjSRyODetqD

u4ICnGeXw3gyOdQMIvB1pW8r4Zt330tK+RdLAI4JAuiCy/DdNWQING5ya7+UQKLuQ+lyZQ7ipYeBNLi3

zOJPGyxgtAhW4vfkLlXx7N5ZySTTgqG/uDcQleShwQ
l7E2LT17igZXBnmG+ssfwl6NWlc542z8euzw10DGZAS+Ueq+9fs4m4rqBFi95kXzsatvonWrU/6GE+Ru

PqaVLLd/im882uTouxq6HTwUG7ORoPELwEJXBn0WPHWVn3xROrYvasgHbtCPU+q0QfmT1Y67VK1FrhXh

O6x3KrZ+nD5uCm9tAdJB7V5UBktJih8Qr9R4s2ATru
Eeajy/1XwabyeoxknLuCDmy5VfdxpQwLAcxoUUSBZq3MT64s607gkEGwdr4z6/VEAaUgrje47tnZQOpq

9AoYsK7Ji50kBJ4zvnZgxKBoMR8np3sMNgCHzUzWv+sYq779j3f+sATHcFcArUSTglRBJcVwBXW4zpd4

ZscuTJztFGPK1F9E+shvAc5rYfEG17K446bMbz5GaQ
ZHGkQ+q5SMHrl0iuXS3mLscNAdaOiQ/TOER0kQuZIQ4f+qRdby56Pi5KiJ8N9O7RlH4QNCKkoEqLrLru

frGlLXE9dl+zSqy/LCKCktgMF7gGtoqMoeXYS0IRXktdu5J7gfZUEGql2m/era4553xYGjG8ErYjNZ84

dDvDJIzQnKFo+nFCoqrBgdEl3IJYsvimw1izjaf0r+
o5Yg13993JxFUdHsB/ZqiOAZBiv9OOaX6bpOixynG2am9CBFvZwgq0hCTGFwypvQv22PcUBDdqFrrR/D

rio9fzwj2dHzxkwzrIDPtviXWPGg5vRL9aeI/d/hsbZ14/JpjH9ZEerxEFAFizL4cvkbjYiL6Iv4regs

T7GiC1NW2WW+7uq4VhNW9i/jxvB/Kd6zTxA3PG6E+0
Ztp/zCOKy00fftSgxlnO63CKnFlEumH9A5qhEvyjIEkbyGz6OoEz6tEsAtJ7HDcuA0dfQuWeoexX4Z8a

afHrdnfq+J9cs79iN7+ypK9hBSs02tUT1WANv2I2i4MIcWmPNoraQ5FzsEkkD48h6hVvD7bB0uiZvMUa

DTubHA27nbph7uLp9eUs8u24MY24B2FWOyk7B3H/bn
lzIIr+9O3JrLmFzk2ZnxNDKhlu2hp/b+L8oLfao2KeNf1RmQbL5RXGL+mc6GHbA6QxNMFBCg36Oc55i3

ArmUw3E/rB85tUpIyTFl4+l8jpxoAmuNgHrPrYiBBS4Uu5Gs1Uv7WIwQirIsVm77IL9mWAwAKtJ2BR2X

EQKwj2+2jzaaAlm84H4/w+0Y9gqMSvy+V4G3KtvTAM
ntOXTuqIzUIA9Lpl8sfoTyxjTQK/qI3XletZWcj3ZBafS7wDc5B3Ojk/bNnCm0i02e3neb7WCLXYOK3Q

w4y/3dKGKpR0vYEeOdL5kJtwi1eOfIhAfIkOzWg6mPUIPlvDZJJJz8vFqcLhmDB7SVEPvU70y0y3eGQE

jgZ5LLWKD81iY4howrLiOpSEDmoog6m9L64JVEq+Zc
UHaY9RkgHakiLciwL0VdXSTRnJqeh20yM1bEbfjyaRczWe4lVhGIyruf8w09JNugyB34dLCmCpbZ/6A4

CA1yLZ/q0ay0gelasQ0ef9DNDbOHXMhMiqRGL6i3pnQwnGtMIHzFL3+gvdANx+b05O70Ro84uyfFMAKs

rPq1cHQ3R80VYSf3dNK+uggXSF4ZAvTGkHkCw/03TI
/krp0mhwV4jz+4Wk1UOJ3tiKoczTGOyf5wxmeNovpErcxE0zeijvFg2D21wA5brQ+dxZHf8TvMeBTsck

T5CO5Xm+voOKbPNHSkAOVLv8vp2Uvq27gBSTQQfboIGsoD0cPmIdzVjdz3hQOqvwR+aioyDTS4sl3l8L

bultXShfk0md4R39L2xMa05DxaOlqlLzMC5YZcJAwS
IEzs/ZDf5IwA32uL8V+GM/7c4OyyUOY0rwDI7SO1xNF37ph3XtzkRZiKg2JGRRzbAtjssYpKoYva4P1d

jNE27dsZsTcB6/HZYssC+6ESI6xaBuk6oSm/Do9/0k3FRPMWfTgs9zmPbkbR+bTrH7ZtmCH2rDSV7Q/E

rvDBKfjSXj455Kkd3RWUdjXFJeWfNq+tH89NeNtA0y
GCAk0Jw62zYugfp9kjJMV/owAbOrHxGfHKLZTluljGOqwjLOD+yvb3JBt5WLgJHdp+Y0REHr+xMNT1aa

T19OS9udPm2tW/uGC05kcdSwyGVxvBd/0EejzWiVlZxOT6T8nsRYScq+GNbYK0HNQ94qttF6Cw13BQ7q

Frcoyg9+8HVvxPj9An00zEyh+S2TIoO7iaRM0Xd2dQ
BQpeaEdzsGt0yvcoaL37Bh4jm8KhxJkII8tEw+6fCKlRHLxFW0XeCmUJxkz5ehcCpCbDmiJ61Rl88dSJ

SumwamGpJjmZKD2llPAcuCmQG8h/tQvIL8m5ppdkralAMqvF8N5w0XH1zCi9c68ZcLzosa98sLjEaP0X

HDIb+DduQtTOJykI43VjpIPRfAo16WeeK7d++d+U4z
r0Z8NwlAV6bD6z0PUg8sUXvQ/8E2UR/gSqDWtyz3o37rOHwNY1PAN+E3NXFgls8/9UpvSxawshSKmqhx

YCm8GsKSCDtdlzaT0klrOnLx1h8wdz9DKzQ2GytgT6nmLU0P4iDJ8mIAqTGOROJN9D0sU1+e7Wz1DQ3q

4//rvU0Aw1mr1ZudbIob9BOPms+BDUq7crjH2zq+0v
99gQxl1bQeQ1A78+JFYB8+dTRLyCdWxkKAqNRF/SWieO9g+++GhCndmsxPIIUMkLfV0/55+qZRGQIXbW

33hvfy4raiUhFc2CfQ0jHE9IdJL35dlaEVxXgQsDtJVkCSBKVIhT2JbEbzNuxQcPN14C77UYfzmvnMfo

GTao31z84fIvwP22MrwfWPID/K3l4DQVvAYGeCHaGm
ApOHxmRU9FqnkDEMti47cYB7qO6YiUXIGMa6orkQtAaWFbbt6FtGMlugdigqBv1QyoR/Uvg5OMcJuoeP

hZzN8mTQm4EvRRzKNtNF2B9CdRBZ3WhpoLlLYKQqPMnmta7f8WmLgl8MopkuXz14Q8Tz63TbF7pvLlG2

gjdvHT9L99B4yNfKWW2BQUjNmEsBq1nDpSbpy9pzRI
RfwSGT1OLfs6JralSzAHMHRYiTddOavemJb/hN0rjV6ipN6fAmlQeyGLDnyZC3R0YUjnlEbfQDHBcCUf

jqivXboKCaEGFSU2Hb2+xpCU9Vb3fkxmme1UnUkeql1a4ATZlgXrig2ltD8BLH2GqIjvrVHG/b/a8Ftr

QqFG+cB6q5kmey4u5FDRB50d+PleYCG5a39mvIXf37
cGtSsvY7ZhZRgBEPyW7hWuy0ugg3qYpjnAL5J3uOwdY/MlTJ/mCH6WxfZ78x/BTR9z7b5AOWX0PtAEYO

6Ep/Tsph56U3cOHgTUfo7DJYo9XhFSnODcNTiqgwBue5bpud8H/R4LfeELZVf3XBfkP9W7aOB6D+a8CI

Qv4UfzZ7n7ogCCJ7ujr4RWsWCbl4WiR5AQWycqEQ9U
Ae8z1ZSyJmHsVxS5P0zd4NrNdNeyGSyw5UzMo1xfsxyRuaXpToGuRkacsK5SS3rJlr55U3aCXmPSHi5L

2RSzsQzjoiKLEHAKa4BFAUyjFap7TfYRydlzfyzSFhvqf4WFxjRLSwXswrvD+M7h60p11pcn0nGZ1VCT

dNcb5Vz0HXsn/W+bCqhFxuxFxsSkq+xJzklRDJ0+cZ
ax8zvh4VdkpyTF+Z+utu/dpAXLM5FDpw09yZnUVJb8Z89/U1qRyJeNq3GxZofUXrj3EeJJoFar7QDVJJ

RKMjdobm4dSqOsUU2T7GzQmlG/Yd6PfOPPEQNDvwRl3Nt8XJqowMOHJEGX6LaoQOy5/Zg8eV4Ap4diwc

Rvdh32nh0JIZoD5POmU/sbVyM5vAVrP6lIysVqgsgK
dwhTRCdhyxhcgkuKWJ0MgvXxQ/Vpd5qdWFMD641JqdGXMK+ShQyBz6gKI41qG52bcnWR+gPY2wRVX3GI

A6i0BM6hitkXCgd++oX/4Uw5jgoykBYfS/qlKtpjpVOoLm7pN/ms0D3LRNKxqgKiOz6TjTPtBe7fkV8f

3olntzrPVKFQvgoOZ5sfymw/DQ1qSEnIwEsT4QXrCS
tw2kK4HgCcg6BrMHYeD9yCLgRtq+I5U0TCbe4QRaAzyzBAUDQfMRqnBCsdoXaAB+fQclk7C/YZQZZERG

LxfeddYOCBH8H8es8ksF9nGrcA1I7OVj8k2uUu5hGVAu8+geAJ5UG+i1+sSCNYMBZAHB3c1RjMRc4jrM

dfyrbPGUqIvbOhsB+YmhiyKd5GcuYcReSyD2vYCNU+
198KxaWbqalsdvo8+7+WjL8Zg7VGRw9tTBCkTpgPgfBBaty90vtSDjmwJWGo4MPUM04pDKIMvS3UNmds

OOHcVzWQDp2sal+r2MhWEZ1yVSy+WxcCiErj7PmqSBP5TdKDMhr07APEczmV8Gw50K8xjszo3v8u2zAF

3LhZ1tms2LJbTujsM/6CzUvLrBNEHD97UXUu7YgM+f
TnsA2M/pCnCDZbGXM6e4m/l6mP6NU5aLeYPMJ5FzciVxH9TEYFQ3XHN8JslAVlZ12aMOwe8zhkRr/eWe

1czJ238aiQ5pwIRvOP6Lsbl3zZeVEsEHL4KCbrF+KV6P2C1p+8muc9xdfnQbkhlxPCgyD/KO+LIrL92Z

RCIXP6OnV38wBDurVf8hbwED/7PWZ6jbO8i2aE12oq
jfeYNWDodemNUe6H6hxVljT/jUk/DH+sXXLcO0zXWj6bWP4i638odUbALCAlQnOUU7wdPPuoaWrmfhqI

/734DaDZemT3y37Dkn3+FrwHuq1xpWgkycAl0KEoy+EDyrI6nUiezaaurLx8UM1S/Ar3W1J+d0zBHc

P4+sm7q7+gkoWt8o2UENrw6XSk6XcmUXt59zXqlqdP
0yU2BXuMLl1AgqgXXO4G5DcoCQIaE9NFMMaHfds+0T0BbBvFbHvhr1HfiSPPoVJkSbeoH/PWEWA2D7e7

R7apagDAbLQG0dBH66le1LZ4xJWgMlN2XhWldfndb+M7EiWjrFFB8DXO/QlQoAow75TmpKJ1s50LIFWn

egWLbUWeESg7CQaY0kGrs7Pm6cAmJIbZTnsrbv8+IJ
jgVbSF71b3K0Icj6T97a4lOu2HSX9t35LIzz+Ygt3yBqVTKtQ/KVVqlULiJEYZIosJlIghR395PFmVQu

iB286dSyYz9YcmaliYXkfnt0Xvv7EL4lLTeBO8AZSigFwUg416q8jBU7A2b+lKpsnFcHnOB1JQAkSh6f

fiMk3NNxikXsVtSUwFRHYPjs5tMVx3CU13pNxMZL+i
soX9hBkcf5W9/EPVbBtSIGcKoer1M6ZgwGiC/pGWfasynpjMCJpJiJ8Magomry2JBKz+jgcM3MFXnkU8

kh3gHLhS16HasuLcn+5r0ruyIYRwk7IjzjjUKeP4cH1/IZYyDO91BU5oe86Z+P8Ht9XTA7z2LC/TDNOs

jq2U0EU+rBd4kAuqo2x1AR/tSQokFhpfIRKK9hue/t
TYdtpxk/sgonpzE0NRTdrv693EsyTa+gXnfiqkagy7f0AQEJbkIFTmtcy6n8MoD9pNTG2dZfM/gzmvl1

0SB5voYEOVM3gZqIE5JldnchNsAdz82w0JhTmyGUfn0mR2aPY6UDv9x4hTLHDlRPanGxIfpnj23wKy8H

WfzayN+EB1flZQeQTA60zRUPF75iuAjHkPun4kMpwc
5Cd+GK5c1yKROzEXdIDjV5kud+VQOGRci9E7mor/jgxwJf1KavvAlceM54eC+W9b7P+4oYVmmtUb22xH

zb+stGvoyK968WilHa7d/hAxwtyTev3IJ/KKnjABAtpIac1dIF/aTL1g+b0Ew/mabqj+/PBS8Xr+VT8T

k85nJYSkxu7lfbbE28E+FbaP/Zbnf2BpMSHuU3PSfp
OFTfun9PdpWSUlFQ9rRcsNui8YFwBvHRsVuGmUutPHV8f5KzqM79i2vYcz+qDJS9HwyFFXjJIH65uSl4

al6+97bia4R1ttoLv0hIG8KF4Pu2nmFxYYyZwFvnroTUVZTKqIuiI6RRuWJhH3OSV8jQ5z/P8uNGKjv8

Pd6Dc1MtomkE7XSJL43eUNvDp+NM/MiKEE92mcVJK9
El+SkxxSkUFYJ5jNrL6z+Oi7KNdARcI9D6AAVvwInm3mgIKwtej2avadCxH/viFbVw4GBKLd1ZATM1Go

mm9WDSNtWz3MC96so+5+k3FAw8cw8m8iTqqInBGb1Ove3pORne+YAI7UKKayVa6qOq7M/xGt0DWXOG4V

APJyugdPLcQJ7E4+Pq8iu+qzVELV+DmVH+zb5/fks9
0mDGpt73OAx/YX0efgduAumL1hmlTqNpVrJZLnhEsN5N3rzaoKT3A/+yemLsIkLQCgDiS4qFnR4VTqqc

ohGiiMscto4jcZ9krUUbuj0wN0m5PXbpB4XZdAQbetmvtkKmdHJ6eKST57S2WV0SC1V90koAnepRnhbg

TBqHXuINjVIsohfuQdUnI3orug1J9sqEmC4ML1M/wB
kZuMCeKLeSkBcia4qoeB7369ONNkQB39QnbFA6PRHtwidjRt5843KPv2KlXvsb+s1S98gUE3a4xo39cx

uov21C1vW9t1ZiF3Ii005Zg/FcVYPrpLrrID389ek3y8JLQTy+GZ+dkY2PNjrKn1tKT88vbh8xdHvCmH

kcMt/BgUhQfVIaQknRyz4uqsah6MeEqSTITc92gqPQ
0VmdI8Na1L6g7TNa5MpUCJu3B9uyUM93Iymsf7yMZoIPbRnZM8+LMym0gSqtfa+jz6uxWM/+MlB0tjpV

9tOEwdPDb9fnvg2MJr9Z74m9LxSOzntchfRQlrv/kRTnGO+TxDPCmRGPInvWgiTr6sOs+btmTImie9oS

DEXcD6L0X6aLLvua3lHSHehDgfoxl0Gy+LQF9LzRQm
o+eUc9hvMM45962Ah6NnJbwLcRStxfnGqUTXwYvAqbtOrodWPbdbyyF6WCRysQFshoeAnmdHuw2fyavi

ZnF+z8V8U2jGcmGlu9UczPNQBxKr4YfJK/3A0skGTSkMzGPh7YuJ5KV4mnc/3RN4egqLUfxBUWOMrzhK

u1jozOmU9kk+HcyE9xLKOCD/1AmVfjX///7Vcf+TEb
7PnH0TayTi7fbTIq2vFvPWmbjqvrXbQok2/hGQCNySuL8B7LEX/GmJHu7zxEmFdA26bDH6y8+TDRih/Y

rBrUyKCNTBD1wOHIJCsX7sQN/vH7Bxl0U0VqbWMB0lmTNSzVC/09nzKFFRMQFuynNQQqrTfcrXUsPQNO

c2rld/gv+18XN4lYmCh/7etRS1mdQdojZNVixwYNSZ
Z0llVshM8Dq1+bDqutd5ucM4Aivg03GKQ9qw9dld6iMVS3iHIiteS2Bd25/j3gGZkOsSu3+6k9tJmcKV

xqYsbCc+7rh9IZUjBxWElxpvAGP8NyXT6Q7MY6rvNufcO0W11AiJifcQROP5+r5tjBgkPhtiUAdYSTfp

au6pcFdRIqQVE6kf7aD5Dve37bIlUbyK/KLCMWJxPe
UaBo1YVcq7qtafkhCwLfKONJg1T7NpmolTtHYVrk5rVVwVGD9LcYBEhrq3MnADp/Z1BIncYHI7/v7tYf

22gxaBA6GtoI/gkwV31Hn3hvo40bcCE+1toGBaWJCMqkIqQ56TygSgtxncT5h7bsKbug57tz/J8ZR1yE

vwot5k87dO/3724x0Ex1P9V6+HgdU+/d6hi1uV6g5P
2dWLk5GLhgCDAGGbuO468ISHq8ASZGjfuSIPqLWuf5SO8GR7gJzwfq/cDoBCUC8G/6O3Yj8a+gskgysh

MH0+5efqVKhkgGiRokZRVUFPVk3EQeN608U4mfiD7JLaMfPC8IBbyGVt4NK+Mky4o9RUJY9yMX62/AuH

hyMevg+TyH22924qlGaU3uH2UF2NdTZnbq2pgEVq+B
oTy8J9twphH4gV78V9f6Ygd42/ee4P8WDem2b0Rx6Hoq0YJva8g8M0vGackWbBe/1lHqA9vonVYY+IEw

FhRnrMRVMZxK+LdEn3NbBUySrwlpqWPuj1XzB9Hs8Z/j4amQHtUSd8cPmbv1KYDtSritOIlRs7ecO9En

BoUwEhkNriEIqOTzJqlTSt5pr2cy1TLnC85XNx3+tG
R7xSXEIRO7HYvXT+qs+H5VjbylY5AqHjln+kAL6P7ehLNwhqIsB52OQIJosf1ciwnudyhI37iU2CU/7w

uz0EdzSSL6N+50cuecbZZlW0wRALxa6YAG26pjkYXntkgYVoh65si179EiUmadhEAPiVQADgF37UiI9O

nTzV+kXiZWm/e29K4z1ZUiJ2KUaKo8ajy8nrbugdhU
6vp0x8SmsPNJlFcu37m4V7EgwKTCkksyqCQaP5LKCIc2GmqqmJoTBa4uUo0CbITQe65Ibrte1AQDhcqq

TEugkfKJxKmCCe/Y1zq0h9KdB6vghLt7h/J2EtGtWkekO9x54eRp3rZHNX8vF+ksHKQJbwhxr6ShQgDV

4HxJsEZeb2uqL4ZCGY+Q/kCB6Hck7AKjE54ObQPb/N
3N5V0YRiTwHvwqmeBLcwndxo+etrrebMdorfC5RrDrTQhl+WW7DKiDJjJaRnu7Z6k6RnVTxMx/I8pYYs

El9T6fpTU2BkNKLOIYvA8D1mG1i7EK8akwCy2S7K0uk4joq9JnaXJM0EM8HyecU2I8Bfj6HEJO9dgeF6

D8a3jH6iHDa43qXQjIY3Whx+5SOmWUf8VK55F91d+u
jCCC1/Xo2Icz5G0FxV3pBZxkR3HUD67gHFmdoZU/c6sPNvDn7YCwYPuDzDknSrZbKibCulZgy3P87lKg

b071RbPpknsidIg2rSui/C4fCklRpmPXgQYhS6wWoLRQKO7UwhC3CpcNXEXkml9Ve7uwU4HR4DNm62rR

zrF8aT3AV7VZ7nAqyiKi1GbSIYUWLX2pydoaj1PAW9
UdXoXkTn5Qnl/WABNmeyISHq/JjN7bonnk+fZJOIgy7WS4OrEm5c7Z6La1voLGnlrLK6+TkxUP1x/0R1

NNJkd5mtVjrbsNB0Zk9gAdClRAaIIuWbs/+q8F6miPTIPkeM9jp7cjqPMe9w7zcy4G0wbnJepyjJ1Zgl

QbGGwj/A/uzElwG/Q72hN5vx+gv78YMOMvLCFFvfpb
QLKHzruC7g6UzpLko4ODfVJgMF1ASLLae+0EaYF0P9SyCUgFfu2MQX9cfxxhq/y8fe/bD+NZqX0VRP83

Gq9/t8uayPUJa7jDHyuKAf+yzIvUWaCrViufxF4T/+D2Ru05Ek+sm1IuvucmVDxhx2VKIU0sFHyhBczg

3fmuAZlAiUYlwa55brUCq0Q1h9Nz/hveOk4ETxDNvV
HkdNB1joVEj2+l7WzS2/hzPV+22/RlsACty9P8dAQ1lnU2EkY/GnCPzCcC4QDa7k8whoxooMeXQmfb10

UN25hPJn703JEnE04T/6AzYIDBF9RDHOWGQvq6kqMe7vm3jB01brj5XEMXjhh2BZcu+PifNYCDc0CTan

ZXtQ+WwHGnRCOhoMVX5tY6pMWTf7WFmMrQvVvB8p4J
G96dRW63fNWQ7jyY0e/z3Kxk9TYpaGkVHV4XxVzXZxo5SH6HyLPTqZxKUPosJL94otbdp02zrcwg7I2o

tkx2KMhlpr9IZYgUhn+L77nsrmhWAVlz75cFxeJj5UBIKpp+dTgI5eGc9ep3v6BzQaexJFCSNV07NVaL

gCgzEhDSw06cw1y3EFylqHOU+OV1rtN4eaPjxedq41
R/kfIjIU5zFv2sZ1/enWNEdgLfDYWSL1WlB650m9TWZiCElEdjR9ihHm+MZd1aJDb5PdmJWgGt+DyFb9

UMXvhr6X3hhrA8ByPD5Y5oh4QjG2BTHK03IjMKKH7IpjVaEuVUzPo3S/A3AiaOUik7Fdi8M4oprSQDYf

WaFBMx8INqWbJX0YnBQc9Plekxos66qjr9yThgK31z
ctjNCQGZtqGO0K7xw+yfEznacKgc7g9TZCK/U2Kx3adxd15Iknqs7MbmsP24bsdPZ1TNdWLz1OZXL8xc

J5UcScU7CTmcK1Bd3jeku17R1FmTCeOaqk9rjwpWfY+XqW6/Dqtfld9Ez8859hM4Esnbq5hzC8Q23nbQ

31pi/mGchb9YiuhMQ/9S1qNUrpdCf+l1T/RIRlb7f2
lTZ4GqNxcABN7rq1t2VpNaw0bnuRa70YIGScB33Q/+DSfJxgEV1zUGBZTUPR/CL9HvTgG1syrCAjmIdw

iNmndydD257WtJ7E1uCpV3Xmr1fHXrjlmK/Tn+7rVRq7uJeWG85TVV+K6p8Jsf4Dw8xFhHIL9ULzRDYz

8sdcj44CdkBd3w4Zzx8RUr+EsX3SiCS05o92CtDbRT
D8rArKe8o6JzFufuAZb+TC5CtTIQfYL8UYKg52lgaTe+HgUejE9Aatt5dW3Ir2b0sipj+RMa/MxNRoKY

gn2qXprG+Jl/x8UemT6X50UFYGTAzIBMSEZlcpmX4jUEoVS13VEwJ9p9Yaz3icdfyDFZnuukJMbrKlt9

kd1GWXE2JiZgqy2ZrgdDsoFuI8TCLiIWWwHI4drrUm
W8LU+Y8NjK8OQx/4HSba8UMvNgL4NP+BsrA0CjGrBXtZKZphvg5XHorQcN1RrbPy1Rh10xORAAnnagWb

g+mjnKxQwVyfV1fX2e7bNl8+NGeqI7e8SDWTddSWsjtzpgvYH8LjIWBGmAtf3jGJvfxML65Koa7xwP4Z

uP6rL6RUnkFbM4q3n3BxV5TTN+2a4xsAFQK6HI68Xk
kJTbuO4KMYxxNI2xqOZ3Lz8knZkliDH5VLbKpKlffYLGDnTuXOexrfbVkeSf2C6j2Ib+0qgb4YgckG0B

/VZhhbP8t17Kn8oo/FjAZkMbI9i/Kwp3WtAwAMgDe3FnhGZiO6esH1zuDqaAwPaw3tgq1/tqIcYmXqFu

dLqyYe3jXunUMEiFL+ZDF18WuOqG97TFW6Y9+O/L26
KXeca6aSc/dXhJZtTbUynBYOb8ILzEg/lNasBUQfYZNFG/mlI8nxKqHFqCL2Qk4T+YoU7u2MfT/Td58r

GndVFQS0QaWkzUV7Uzqnqvkkx4ux5Acb3pz1JWz6FmwLJAHxYntddQSooQ1/wcc+rOscmcX+H/WhYUtj

f1Bu3L7SeIpZcVVFIpEjThf4LKP2kdxGkX+dJna9P/
a1zeEFqZdKjEsKPobDa8LvPRghrnK8/vSKp53gRrVMYo37QO0pD1iHNA6XUxwFYA3DSv8dZo7c2gW+n/

UXHt+Q2JvNWExonNq1oT7M7ult/Zo1O3NpTgarsGJDxVow2a1d+MdQkpxEln0eHF/o8LEEgvy/JHnvcf

rNwem8gEnbnAu69mqlGYh4PL+LSmHN2o28GBAkN+Yb
Ww8/2k2HzAxauNwOVP1suC+SNz4Y8q2zlK3lPtAD1Xfmgx75HiC32dcEE5AGqVCDaN3mfP8S3bNse1cp

zR63cj4XnsPoNJkIqJ830JqPgWjhwjGBRhztwUySPyGPuQrd/eC6hb6REi1Wj3EFz3BUy2/DoVJ/SQrD

bl4wLCWpj+3fExDCpG+OiwyyXh/2nnNF6m1v1cU8cw
hvSivrS3emVY9+jNfuKXXH7dInSLLuWCkROWdlqPN4iap+OWUHKUW4AAa+Q/wQqPZe8l9Y0xkTFdiidQ

z7iaLFJ6I2FBV6SB9TP/1vaNM+hUz1yeiX5UlT2k4Q/jJ6i6Y5klH57HEvBTDs16wTZjafZRAdjCe055

98n9w/P7sbmsBz0UoYPQE5EvmoFAsrP2V4wUc8ylND
OApRAK0SB1/YJ9pef9ZJMPc/87mwp+sAyimGpk2dzsQb5UeAAQ7kobHeHzQlbOmNyVgijmLvtigJal1h

SB02U7VoQGHN0VhdSr+JWrW3qhpGerMpMaSsBgLkZy0GSUBnFbMUyqN5a9HP2IXh7v+3flfql4eciKY/

UlkRo3gUPMmI2htXyoeB0knOAQvkwlhTguYiHIQ4rg
hsQcyxMvmn2trOeys+HuofZclE0bzHMEoD5gUaVQPyxI2mlhF5V1zvb70v6djOydwToTegcF+b2FeE9Q

jlGW0uAx8+Iiejb3/nygwg9D1tzHKikJaRjngVAVTVEfUakq2RX68Nmwe1iD3c3Fdb7k54MKSz0dTY31

Th1/ZOvx7CEob8pXsL52ko9Fma1uIFTgA9u3+4T/Ok
3vwVcAV7Im57wFrz1YOFyAoMGcKaCl3f4x2xG5WrLwEcaYf00vbPOEdFKcuzn6if9Mq3O1XWqUydWshk

Cd/cKGtwTY1moD1SurhXp2khQG6w/hI2dEUO7IFt7x/x/Ij8znCNypvSqaRBOWb/YNcBYu1kIfhXUayC

Po0r/rELA8DRDnNhf9z/8CRPUcXj5kfWe7jg+kXuBI
N0rP/Rq89rY9yI2wFD/MRsAQq9B4mD56vRQ9UlvsP58Xkf92uQfWuWU3Q3zGDUpAupY6mNE7evu+NZOy

/gHkHG5kGk6z+sCkgcZnMe1PxyMctYhZ+m4cgrsoYDVsAsT/An9W08kKVXSRXjJ+y0q8AouK5wS9jG0z

Bivjp5bgJA5hsTzXbEwZb/xAzNcmwi8GC1kySh9Yft
j1u3B/EfcGtDCKpXLt/8mBc8AS/gQRDqWeLt6sZtiGk3tEAZu0czK8hId7QVWVVYgbst1YJ7ttaImDyH

HN124akcg1jbwiWtHm+CfaZ6y6c3DOFArPrArmLrOGn3QWTprxnrQx4/Af8fG1dNKOWTNJ2ztgO99VGJ

1AV6RtHVDp/rgHNU4F+6qSaNAzl3iin8jzGZ/KYN96
rAtvkNZGcWfHx6BaJmKVAoBK0L+DqonnQtbhEVW4mr+EziAnwzW7o/FMllo1wvDEnCWEaKKmCqkwzeI5

oLBfV8J+SuVmZvK4bj0Ym7Rzta2/bBW/ABHISHEK/7Lz7wcOPRZdYxuUQuG+CJMTc1+tsZDS5C2MhdxaOavaZy

JR275dsfS4kQrchK13ZBNBCuj5shSS+YRfewab5Hhu
RqnrjnrxAZN58KKfsDqMe84kPbS7idPbcXLErumn3Rhlu1HhnZOI8Whmt7oyqwagzjr69MyxmMXxdfc1

7NYbPC7v+faeDwCM4/ysfFAw6h9o70fYCMXGvjkqY2fxR5UEtvDT4m0ZFrP9pGTVbPbX0oapcaF27r82

KaIYMYZfLRmZgp6SIypsSJesPXiKZ/9i/GytHiDw6/
WGqcjPdF2jlCpSJ1PC+5DPFe0iJeUujOjIUxHl7y1i2F52ZGN5qTAkvFshULTWvPVi5bIbFQ7ErNQH0H

crzVo8yyVXZ3Pzc16silYBXj3PtbCVwtb0XatESsuOTPdM1azOSNK42MY3jrV8wrp+c8D/+ku2+/9D38

cXFYTC4XIwprTa6CfWP/hIJrgFJKRFwXs/eeQMnPe7
+ZkI3N6lDqLXb9F52eRBoUJRlm0qHrQ+yQGpBTRjEhHT1nD6A4ugmPoqcBstiLVD/NhPr/XhpMNdK2ez

fYmCz6+uUurivgyZrOWemAn31O2zPSYFH/OcsJfOTiHny4blcHYsVUgVkU7wpWJDin7oG2xTT5NyY1Rb

ojPqirP78XrrLWIk9SXUyqSq7GY8uD0TrMJ30gb/35
Vpv8FvD4Lf/TzMpT7RaZVBMmKJjRBoOyRG5iu8Dw0mrK15QVwIICVZ0cXbar+OqXFtxWO8QVDzhxK5pY

FrVad1Hc/4e+dr7guVnAcPvDJulr4Coyidsj2M0N8s55hgMKay5LYymiL1rutvnX4meW4gyZKIRZneib

E1lZ6CeDzR7/BmiIivamhBm0ambcbWChBz6+6oRUaG
fmCBFgX3HVrlDUPtpV+Hpdi1yNfztG8uJKw94OaWbYXs+H9LsNe0v3c37i9Lu01xdncwubu28Ep7nK3O

hX1LhRr2eZmxrw7zkU6UG9HeS/kB+IZHdCeVKeDEomiMNEmUO7zOxrTS/2ScLoc3HunHKi/HZ3b/7bNO

nCp+b6ERf/a5E+rYVXqj9RT/ipq9H2+01WxWq5HgMA
IIhsAy4ilhMbCN7HvMOtDoqefDQMws2T1/t269lBhF+axG031Wsy6OkD+P+1hDAM34WCbrnkFKy53eL4

sGF4XNVX+Z2Sb0/rqZ+ulgsJ9GW6hjd1hSgW5GDN5fJjdyfmhgzY2un6fP7LTfNJyf+tgJnTy/jb1dSK

YDex6CVBtwzHbN4aSER3jN2egqOrGAQlPaV2bdo29N
1izzUj3OpbUeNs/VV0OwoOLL1xLnqqVH9pyJJehHwqSF5RRHHzDKzngxqVuVH1n/qGmCnr3cayC3NZGI

hVzzlTwyxY6eD2oOVPB/+AoGIaC6WAARNqZiPGmyiZviAelXpMQuqZYZEhLnhoOdvVU0WUAsOMChB2gb

HkO/cqCDfC+KsJkP0uT/Qq79ushh7u+oSzjj+2bP2R
fW/BSl0crqNS0uw9XTQzoPjE9YiKdDOtmgx/g6jvnauAVFTg4o+nC8resUfNT7U5XZ6Q8XcaUUHRVIK4

H9bMkndwlj/4DyYM7u/PTSRGjW78Icc636N2kZy6cx05hDfMZRzQlnZhdmf8as1Ian0+l2FUCHpDu2Kz

PlvlM0Cs+9CMqVSz/5t0BI4qhJozq4ta7wUaNgmKTt
m7jPGOhhtgnvVcVSzaQYubfnGlVkqElzE/tmben4tsPC5BH4/qi3Ex2qp4d9SKfjPpQX3S6Kw04xTIjX

SflvvDHSY+M2I0qoI44JRcMo2jql8h8txQ+ALTCC+X54VOVpbEGqCmGMP1OnTjfsO+9aT/KSWPtFiChu

veUkjfuoVaTgumItOYeY1Lpn68pWnyac8OLkn78SFe
0j/uc46ohbkl/oXd1EJwW7YZdLJs7zvpLLNd3XyLpT7fLdGCHdUruvpROilqFIRAzW0zIgbLI1Pqrv9s

k8245sc272fMmz0tiDb6cy+8f+25pngT5wm+/zOc+ly7CJp2Lc/I6qdmebPMp+GGLDPx5skf4+3BCaxl

RUhzryref7kxH06l3RmxpJbQv5hzeTfECCd/HD96kd
1LSAk9u9jYnwM/OkySvyfgG/O8BnLzPiiKxjQbVXINcPvQjADNWg61ka5PScNouMx+iVRx/WG7IhjR4k

3el4oe7rccvv893PjOi0ukRvago3ifyiZORNDL49NWwYzoNFWYAjElBL2cJf2h5YaWX2pY9kdpGnn19R

5sx2M2nfN2L1aUjOjyf47nv761OQZYY2K90EX/VMxM
uJsw3K++BXtmVJoms01cxGrYa+8lN62/BbFkV82EHAFN7HELtbxs8k44wYXEUju7uOS8YVojdQATpsyS

1VsEA95lcZdGbxTy7KtSbgGHiafEvlNYDEX5+pKuxDG87jCsdEFGlqNUsDDU+KYT2ylIiAp3Q7QQF2b3

fQo8shq3wHB88o3E32zyqVf6wklDM3XxMsHXMU8BI0
qbXeds2zwephQ7xX4tikfXijAZM6PVA1dZV7r6PW7mzBIwj9RKsVv8T2QkEuHQ/Ogh6hDzW/eJ6lJoYW

CS0EJQwWQtsfdi+YdoWm+E7b4f79RmoaHxDJdkf4/6CAxU2cHcCuj2XPCnxfdH6s+wrChCbgUQAQ14rE

rq/zhlVn+CCJnbh+oXxAfstsTLUbn9zGgoOT5z0WSJ
kf2LrVYAEoAR4mOEt/5NY/cVyWaRWqxNTcxBOhQIjwpbn+DzBTZSV35Tgr3Lxk1xziscjAsGAxpL7zzo

5L30+af24eVopixtQE4jOJtUV+BiaOj13SzGLLud5GhiA7Iv/xPsvSwPp12zeQoecMbl1B3VPi+r6HZb

N/2bLkm66i36iC/bAn4FjP4RgziL5yyP8oXC9oFWhf
x17qsjDncyRAfNQ199s+V+6XT4wAQ7d549Lemic6yLK3lw4tVOE7Kw181P/6C7mYo54wFlR178E/jNcL

KirSJ/zyMNRIT0ynfo5SMnyV6MFXIxj+Lkc3lklM30YIU+MLP2UXydccz8o7qSsGLHzsBQIZOvbQxrD8

vspItHKPvQ8Ljov1/2yKWaXE85r/xI0TXLpWI1FmJu
oI1bC5YquTVJDBE9xcBJ+5YTMFV5xE7dlQkAcXXx9kr/ZSzVwsqD0Ka7NA5tbbhF4g9iGRi6gSZZZ5Fe

Nk11IZBZXNCTqw4lEksB4xhdEzGIUce5M08uDyxY5kUjWV2Kc7oig4IYSHZrYCSAi81Kfq2IgS3lSNrc

icKnX2ljFP1BaMkUu51NtAHzZgzVEpeCvoNio+lNMl
xlh28NMYSgAUvZWdGCHdLOmRXBIJ35ywNjgzb/cLK+yyM5Z1MXwP0LTT5Nu37EFRdVYMbOxB0tNVaaNL

T9ViozncFfNyao7FATO+NMAjsvmV3o3cTGiY+eVJfHJxE8TB1t/3pJzWmx7CkHGnGzfReCib5Fcfo+ZS

bmpepi+aOtzGpvvcdmmlFuHZEeisodIx/V7Jb2m0Xj
Y5kaUhv1+tr45fSrq09hvZN+GnUgW9oZiwP1gWCTusohQHPk45qD3jMI23AfJEzLCBlbiQbfm+3zP3X6

WJOXxs/dc2YfXja8ATUqfuiwlHvbQT8g/FoCkBxoLiBaF9fzBL/FjdDfrpZu/scrtaRijZ4mH0cIwmkB

5RaJVu/mN15iEwcrMaKX0URucol/ysc/jSdnTGLW5+
9WKqmIuzEA16eXHKE51KxdNy244C9CbPuajLb4LvofU3QucoW8CEVKM/LX9w2sNZ/SqhrDSoNA1pUzgK

txw9vJYCE9HKZdnRxeFeamPA4ZbKLIijqElDzlhvqMQmmHq+mlhsZEXoPIRM8FFsGXPz+aq0JCzw7SPc

t6xEK8rob8Xd69dTIfAIzPKfo23FcKK9Q5iDJw789P
Ni7aJS0HJe3U7SjRn5aPceQWbkZIe6LP6jS/v87883W0jSW7IpGOVZcuszAzGf8TvG0VIsPWbZGp66lg

CGij6c1pV8TOMejaHKNAhYkyW1bXMg30kli3WIh9DdoyK+boH0rQ4+yJBgCdt6DBjDJN40Lwl2GehHPY

u4ClJKWUktyjPRffS1T0s+bOr03KP23/NzKDvOGm3H
sbq49sptpXTm+OgUl3P2fCjqCOhY6R49fhejsLR4kOzcJ4WZz3L2nMKn34A74KhodKTaBo9or3Vih0HA

6ZHzy97kCRf+hEoB4dxoQvEoeelG04gBIX4z9qAzhwWC0uD2cwlWbY7l4U0OQF5WDz+Lo8j5j55aFVcW

62OIg7CP5+sD8WMoTvpATuNB0BE40/BtHvLdJL/33q
Nj36dVkzXNrcb+CGIGToWRv8MM15mtoBmcf2VvVTlHnLU0vsfwkrP/OpDJt/BIOuiP6CLOJfpUdrd7mW

DbTWT+qObZxcE6kLn7Rmf/nbXThTaO+mCMo0c/pGCYQHMpyPIxHllX4J0pyRKSzkyKLRd5ZZM50VqTw+

7dlw15F99Q5KKUWl3rKfPz74Pn9xk6F+rR5B68hlEb
SZ1mRocV/0TtDv4/dyAuzeTPD8uyLRx9AkVqavHt2RNNcWSSeWo5birR/C5FiyYvaGRPIEMRQqoR0SQU

WMPFc5YhsStv2bCqpmA52vKhtU4Z5bh06zktFV69dRNleXDSy2/2hMlctaE0Jh8kxZ4kg9KqQ6eiVj1I

FZ8Fv7I+WkE8XFtJ9ctmwOYuyd7bJWoipp6ireStuN
CPv50HI24N/LaYYd2uA5iw37vQZm+fTvk/cfLfknj96E2KgGRPJASs654bn3SBTo70kiZmjnG28IzdYQ

aQEWyroSy+NNvP28jpXcPudYnSaRw5zafDxe44fXUR76lUYCchb8W7KHAraY7KWZkglzLFelQ7FxkX2t

GjyN+oYBFQMbOyx0ePcqThIjbaiGJxHT9T1eSD+bwN
BsetR4IfMDaDzEuheBS/6sHx37SHSDCrIfxz4bXfjFGRorgRWraWSYti45yXTkv5fPW6q0op3KhdnH4Q

kqxjIzQ3L63DFXcABbZARmzYEI8bgkn0Wf8vId80FZlgssu9KmioZPTgLEkza8khRC+5irvm+Zdy8Vxk

LS2NauS8bnIgl9GKQVbbwxt6vQdtxZ6Qg3/Xhlioid
mNhWPoSkzbYEy29/tJbufLmW8sV/EIv9+DNOUnxAC8OgaCZMKsAlUVaT77s2X95RJ9q9FngkbG72mAHa

Z/JlxwOMh4l16O1qnrXMbfW/gVe34IW6BIV5a6Tsy9YCxSHPJYZp5KH4o0G5pPXQVpZFS8Jmi9FSZF05

xcfHdCj1UXzukmj1HhvqyYRa/yKu9hHWDpFab6xHVO
vVAYszfO/Vyaze/XIdqhNWZCPhwG4Hp5iPf6MIURtWbsbQV3NpfpCJ/62joD+XY0fjF7PGrg7DWnLpuX

LDk9wMIZMhkC2ksirbYkT1979A/AKGrxzTFq8XjB+QlxvnFaghaJgLbPOPFR7mnwmjHRWEIW4MajYoSJ

i0hFkEx4s+1JqmpTrbN/3ncdaF4IATWsBiWMFCmcAd
G7Rax+0gKWjtzdQSfpP3klY9rjzr2WkczqYpT17tMPkOpoSXDSV4vbP36ZIgweUZsfyeo1v1ZgRguQ2U

uLz8EFOgklogHaoh5bN1hyW/eIM2+3WnoJPCYHg4urpL7Ydzr+9V1NKOwDruRU0tsyDMgnV9MkrHJSBa

rco4cQrWFR7zT40n6B+ajoN336KfdaMOiWKiw2t9BQ
uZBJTglKvPf2mV1o0rd8iKoJZMuzIAfEynEc7XpfP/fGnP4sVGHbI8ZPmh9e+ij6DmqmVKGkiHz54sLT

x/fLpFREHZXuqN0JTo7RPpNzzqX5UcUOcETzjP0RSu591M3sduQYmLchVRFea4rysjsmNn4s7IYZOF4c

FqYGOTDK185j0lZ5su1+LXz7C8OPephmw5Dlcv7GsL
JdopPtPFZ5x2xl4WwHH1/tUHlombEXcx6m/CL0jwGzBYJfrmaly+7r7WSrV1TXd+nNBrZ1ltNOk5idj3

gMdywUvi39hG35dsSTHaCYJOOgvdQpSH06sYL3uv9B8K1dmkl7ZhJ+tmOUKAfwUAsVvgYwQB+UHxrKdt

KkftGmdVaj+Qn/ENj5jyAcNZeb3luNZ32B+asvwALt
69B2vbkKbkRF4q/s+7gO85Oiib9pRNGG78/uGxO/DH2KyBeykfZVmBJI67L3BzBbpyVaSy+djPl+LM2T

bph4JAMscFuezkw8Z6weT/QYcvR9bw+i25gepHVusEv/1bDMx0HlE2cmp56lI4zH1qkLeDf7SqsvhyV3

rHNKtSS3Gc4C7z2dBKITC1lZ/s6FHY7ScUEBokJq/d
3MoRynKfcxc5LzpnA5hZJByUCj2K2Md2zFfWw+/bCLZYtOQK+/p+irp1YzKdjg7t5NCOmU1UaLgPwj2+

ybcraaXxDmSL94Ay89iYFo396TdNhRaLz44fakB/jluYLuV3atSJ1s05tx6EjhBBH3amuUVtPc2w9rLU

GzcjdrDsFUleQDK5AzzXwhat2V8O5w3GX5JvMsf7zA
HI8VmPvrnt+FSnkH3hH3Wj9oUMZt1QadJJL2VGw/zFPSFZ86xuqttKs7Q1s2eo01kWneT9AZ3/WQ0Any

VN90neCKrUVJLtF0pnayeqnpld+eWZvqIgIH0uQqA2iFQZeL/O9k4whLdIMTS3J5/thplxoYzs0ETxl3

ZBSN2VASgh4Gz1ZhWSp5Jw8LJH0vEGzQFd41oxnGSF
tbUFcqQDiz/T0ggjt7/IG2tX+KACkt7SNornU3G4kfXYkXFCxigxnxqHDOtAgcTLcQ8UAYjKEh7sv24z

YO/zabme7s15ko0vuYm5J1qwfc6bal2jdiYa+cTqFRHB2o2PZpovZdAyEtAt8GMloI3Y9epqaCgIz/80

/129Sws9t5KC1RuGqp4WSRDptU+qOCq1/FEk7fKXGG
aAE1yYSn4EPXRVdkXkiiKNMbM0JTGu1LuPQgIqOH4LScTnuCT2GP2LOiXNDJArauOE3I8850eHpBeg78

syHexP/fuYfyEPOx2pMWJnE1KuAJdEprZ/YofHySi92dkSp5ms1nfRUUNuryVcwPY6n7QUMK7ZzjL0Pz

UfHfU8rJUg5qE4tlh8m45AVMTiAi6fRct8eQiW0Tw4
4ttmQlIn9IVSnRJhw0qMvcfJfE9sUnyoxe0HIIBt38bsmixn8Lav8GRBgMWReyAsZSDZ63fS+OGw4oyN

bwlzKNU8EyNJn7V4QOW1Wl7idbR91hJuvs+JqvMthRtPYFK5/Dhc0viTkGhJr3kUIX0coJi4qI9gXNzV

kDhnuKFTB//RMwQOvOwyX5zWrX/ryh8fNfISPEaR46
930bJ8/k6lDd8RpQYYYLuOceuvG5GLL99ELGy/dpZBErU54VrRcgH+4WRZkf3t0YAfGy6uZ311LDkYrA

rA2cDXl4K+H8w/snmr4spOZ3dBdtbBPTBBI9l693ArknQUE1WFPP05xKGIH0ZYKhfpvJmVMRs+CSLp58

eI2jXPCzoRlHRS0SDRyRWj35M1z2u+NkLYFHYUNETN
ElUAKmo+UHRUO60zA6zTliOyMskSH4sadAi6KtJZt7FR8ZsdV32r6VjFrDSc1iJAqNae09ZDUz9XAvpv

TK9Rm3VQmZePRZU/JWc5uyyCVYwzEFhx9C+pi1orpuvu6CbjdI0EJ1FQplYXWrcd/1D8ROC3iF+rcS7k

fglG4lRf9ztIumhwTyNkFsM2nl0SZF6axOvuSI+EaM
isnM1GsWpQtP5GTqEtSUdA628ETWsrw8xbVn7yeajvtGE1x8F4H4ygJnCOONCmqX7xyTO+bvB8j/2x2U

/6bQW9k8cTR9P0aoeYkIPSxAz7PKOw6ldm8YOvk7LRTh3x2oAJKFh3xxK0VgOyb7ng+FoluiqsJW0DBo

3RPwmmirn3OM9Af+8Btl3V8WdpRne+YSqEj/FNSntb
T3VOwZW5k1DszbWVKN7s3EVjT1WGCmGdMnmv+m7Xg7oIPEted/5mBTGVtKSOjh6Lm6tXCvX4V1H2qBAD

vN9D1S3r6ziqn+dFjPl/GufjGv3eHe0ro8Y8HCOSfzp0W/X9Y7GyLFCEEmIP8RDapY5tychCcCwdGNSs

tVTXwxI/aHZW/aoNDp8ReZmVd1nVZfKPDtDhYSk9Ws
tPue01LNIa9SpkpYNHTnDdtrmJ+z2Uk1JbRyUNBXhxyuxEH6hB4iPB3rIQ/Y3gZGMhkfvfZJ8v+b8Aq9

6iABES4S42aMmiJC1lv0n8TfRwzjR4ywgwm7opBEsGa/jUGCjYIY1hUzwZPeiowYGl57Fmx+/+Qj0CTq

+9qfrQ/Ab0QE4RGk8YJHWxfhPEQJyF2jEJqobM855+
e3Bo8C3//niQe2PwBES3iEFBQyADqrd4e7aqe99QD83I3OJGY5DgoIAITRv4DDZEaH6X05drK/CqMset

Fd7aQ+C+A0SGcqg6u95SqmB+aEDngXJg8Z3Ie5xMYCQtxh9n4DPaqcnt2oWo2M4D0GCIRVGME9RYKzpM

P3rExIhXS8rg26MiO9w3k/cD2CNYuP5GQs1bbtZqn4
z3N5QlcB4htJNwHHW2ZhBIujHB3FMddkk//bY8UP4QyuQXlkmtez61cg5+rSCkUS8A4x5IBeppSxSvfB

iSL4LGMF2Ejni1O4Fbt8GkAh5gtiPtRv6Q7Qyu2QD3NUjsD+AslJRQmq1EfJiB0wnY1D6TjQpU2LNPXe

jBJq/kYoFhR3U5LDPdqn+7gfvSsaeqtjocGiAlWSl4
xtBfo3EM/IUrAikJ2Gi17ZQSu3L9wwGFW5pRXgIRXhRMVHmrD57c+5dleobQ5fpZXyOF1FoTqHSTyV84

frWVxKDE4XlcjAS1R0zphKBN3+0aTyCT0kurWs1sOQUFw8OhB9n3HKBBHalt6lNlMPCwVgV7fOMTdR82

6O+c6hdnkhDCVoNqJszYxL0aL62Uw9lm90x6t7lu6X
SkEElLQdR2ylwstZO7heEta9NFqlh9MBdMzILu5DsA11h1aK5GHk+jl9+lXjfF0m62gUchqlCioLRRqD

egQT16w+BAJMydoVw5ZbrX42OZUhhSE09qBvPn6eJIYE1xl9SzwzAEe/KwPI56N2A4dDg7MWqEALFdVV

QmIuyqr7ECcjEnxX6kO534fCdZjq8yAiQE5PiM2Im7
58yRLG7mInDVzHxNJj/cGpeaaLEWtbUl19m5Y4Tv8lOt/YKpOodkQ4JjC0wXA1d8qREaFHZABONJEDjP

y638Ij1gN1QqMypySvZFF9v+fKPQ3xMw1CYpFSp8Hub43OaS095H3j7araINqtTWCtm9ZOU8VJ3ahD1d

SdE4gjMmjCPIa4eOaj39t4Fqu5FgDORUSE2M61uTpY
2uyJcboRF0r6jqHOo/a0qkhqNjNnUUN/o+KkyqUhRk0+LWMZ0IajmlgXQTx3eiErv2FhiOrDPIKk4b8y

pV69EHElOsmXwuHLNl5dIfai7dd1urIttB6aDdU8pzJG6qxGW+Y39N3YyVbYQuaZKEll2irVo03aF468

+VcYepY6W9D8cw4dXNauBkdxIzCohzp7gMT2cIbxqo
kzhyIukERfjB1LdNUj/wTqP3rnI/QgFWyq6ayoEVsMQWclFI7TR49/qMBchCpeLzjTjHRQ032wQcGJ7N

cfXkkSefzdio22I0wuiYR0qrmtqmiLe4FT/LodGF5dprqGL2QH4GkJBSCub7UA1oazz090gj49V4Pzi1

pk95mfkpoiA0t5k+ObXRO5N4+RUSwVo77ZSkCHDtBq
E6+uKLZM0Yr5rCzeiUjI1eoIXn/7UhI78jzIuc5Rn+ReNPuA5TWW2ZgS8lFwG4Cqd5h2buI99ChQbS5h

TH3QPBcKBUhIPB5laiyHbLCFo6jXvI649RDqnxLzESp3DSBzjtbkWSeqUpMFYkMIwgPhMIdvP/WvfaU7

TRJfEA9m+pdsRkbAnj5FdeMfwbzSfxyod6xvRj12cz
jgDpzCtFWV2r5P49KkurMMern7IsvMRgJbqtoFxcUx6CYsPvktuL3s6Yd5PTXnQ7KbVE7FBYlsl4KLzH

8iJ3xCQIbsqXt6e1Hx8ifseqIfMZBxr1Z8gp12RXyjk+S/8HHVU6Y+0oiIpQu/Tgn7qdpsSg6bBaAq8P

1Wo4SKeNXA2ipAazTZZt1A9TC0vDnNuebu2jFCH9Et
NY0OZHq9Ezgc0LHmvUcyFkAyR+59ejD5PuQPfg7ALIegAvXUHqTPuF8QK0DXBZYHQ770LEXdxFN59KwX

JXmKLCyLizW3q/d+aXIa6acPW4ercnNf+M2s0j/y2rwvyJg3madvTFUmrcPF8o6mo+STHMKOu1mCa87h

2ny7gfHcwHw6BnPl7t1AQfBzG4v+Z/5uPUgAj2Xqp3
vrbwY5ZaG5Ne286i64XAoAtW6WTZswvHpNJuw6Qgb9fZ2vQs8IcZX/TZvizcfDlBTRIwmRUR2PZZoXlc

BAwjbmCo6kXGRl9OpONeN1RtiRwQe1rbIjCij5NIpacJ1d0iGcIzZo2krF+qcda4v4FXBwl1V1RfF7Tx

V/ZPaGNoSfy18LaF41M+3ilbattjnl0ZCOGuXHdY5C
9kHccNB52fzS7t+2+15kc0mEmGLV5tz3ofByoDTclz4jI3m48Knqbi1KUUkE2rpljt362lQlSxS1sw3w

H9s5+blIi7zTYnvT7j+OXvV2z8L9QN+OhXL64PvXcj3EJAs3xZ6KBQ5emiKYnXxgm2YrpGdPtoUNFc3y

dLOwmg/jQNwmeGZacx1nOSO3t1DJsn4e3d8txbFUav
SxfP6irygt8cZya73msz5xyO8hxWwbQ74F05WAqV4cL2vVG0uBxkqiUDcAxZ3QtEfsDupRlF1dS7HMcP

gnR31PO0XY9CFASzWJ23jaefyGFU9E41iZxhNiP3mtNwvvx5hmRyPW6KDmvocxyxKGw/xMLuTMrXCX91

4U9393kaI4e2VAZI74Zea0cfPhUQMejeBVCjksdbWo
SBIGn/rDtfxZcqdDJMSjwremoEhV76H+Df9r3T1KMS50sRVTxYwWFzdPwAsujdc+Psc+HelmoZusnpoG

a6duME7MPJfkPncopu9UH3qr60GE0cPAUaZeH4zNrX651tvZbzRDpz7nI3vAy14WW+mA9h42a2IusFFW

wH3LZxc3ApE7148q+TVHAa/rE6p7RpnKyNOYr03nTv
WC8uFwfxGj4f9J3LUuvBlMD0kXbH3/KkMrYsiCPzE/gqxxFa8r3wQz9n6jar+CZtjZoXXze2GJ8bIQop

Ni7+R7k6d12jsuIf23FuQZNksws5b70K4M5t7O6rzY5cc6iSNo9iqwp7yhgovkZhjLM1ZzG3KTNR5GNs

4Ow1yQ9yq44X9G41cspcc9cP0c1aqofODzga/n3Smm
6Ztf/nQAiRV39YhbY28Zv6WZVYtv5pHqbm6PAQ+vsTQNE9dmMsWkqzQ8h6wgCKflNYKGhiesHsfkXrNh

TfJK5GCi4jG0hL8XUkE9TQ9gMol3V+Kwz9oudnummxyr4e5egvZ51JxlItUeaCaVwzRoJDc1PzobLh7m

s0788QISsfKjXcwxrZQAf95uj47Jjmyoa5Djc7Q667
LMeHxtkfDhoANx0aFSMgdsQqOyB0cK9CuV8oP07EUV4T0JLl9b4951jxt/qlY/8fMUAoZWJfYpN6YpEz

gBQ37YFvbfX3kHJiandrj2tjbHJaJoUQ4jeHVg68S+HXi1PCK8+0r3xh8NQnc/ZjKbGTJBwQ2mjtNVO7

jcyFHcfdfYCg0WvKeVrsyEYKIeEwa9J6BmYLYBNdYU
VNVdx7VQ4D0j0UVQh8wO5ToDF5Bt1kyj1dDbnZF1AD1xJZMmjWlSedzn3X0IUezeYdw6zAHb1bSnXb9S

MV/Bj1SUVtJMy219FnEScltiKFTds24ZhaGbna4IVreE2eVlt19D4AKZOn0u5kP6RTaSyn/hMRdWf5n+

S/xOirdlqBZ+JPFlndtzeZBEUp4oQc1eKIAZLSNOb3
81VhNUBHXJFV2OuWL5EgtmIFej4Q2+il+SNcJBhxyPZjyFbXzO5wrkfkB6dahOScZZsxDRmH4JsQaTQo

4JuoTXHSmivQAsr5uWFqu1cjXuDFuaNoM0+SGJvF/Y6JpGWt4K8b5PEnoZ9Y38l6QrvuHQOGRXN5k9Z6

6OF/LSSnUCPeVXyW+k+5qZuMg4YCCnDx7ECqF48buA
Cw6GEOgJDdZtbqv1JxvccXLSVZhCD3VKg1CseNh27VkKnK1l71NdTDhDr4+5Kde4VbrygbtYP8pMk7fn

TYlcqFNA4mEP1FZQfrevYIRjcKJoVbuKqnZ/ab01bfHB0Vjo8LUTAbP9HOzRFimf/QweUeZoooOaFGFz

snsZMp/2bX7GFLjWTtVVfZSsFQzbcrlrrxm2UJPIGJ
m/Zcb+f9wmsaFZxMWemPdEOMUov/3Pbb61ZrS9ZsG+ERAnxaj2R226PoHWv0TleijMIIR8E+d6mbDPMy

xg1Vwtk+SUeNC4UZoNrVXTI+OiqAbOBWnbI5XfXu+E57h4MQlusC0KmdOCMCvCZfZa1cWqm8geELFur1

xGn0t/UDKO3s+SOsYuCpc1uKL1e7Tog9i8HhkqKBpU
p9/J+Tzgli+4RSG/66J4/FH+FsD7Jxhr+GzyUIC4/7K+8hh9sFR/yTYShPpXO2706V0PVTemW8FW63ld

Ok9c6Ix4fidcljeyFg7YjS9AwIV+UnfKBbOxpt9rblPXjZ7NO/kYZajZ+JF05qPFB8b9reprLfHfeqjx

+Vm0UV4QzvwUHEDU0fcH44I96tEhU8+1ntI5EsFiPg
Gr6jEfYMQRZHVded+9I4vv4EGTpwjIZXmNOhL+Ff22Wc6CDHkdFdL5l7n4uwq0V4Xk1RN6daMqWrx+Za

wFoCuCyeMN3HGA+xUiZpbaTzVgVw5nWet4ZaJAr3/lNYrZRSV0drffb9hVHFpXA+rURBW7vw1XsshB1T

fk2qwH72pnnN91OnouCRajjzteomqs9iBekqdej7/R
9EMEex5ItsqiZz6VIOl6GHxP1dD8dcSiTBInNWjnIBjD8WJIZ4g0bWSvvVMEzkx+cz1oaHLwVAucY09v

lGALL98E/D+txvB/Gbgg9hPfJLhC70SHohigTXOEnclphuKtYozCdoACgT0m6uzzEzi9a6zEYCMBTf56

TF7M04+MIvf7xLC/ppEpqjIRh1SS45922JdWQO5tVd
EV6YAPPRvXBkuM+Kv3Y7FNKkmSYnE55HwV2lU6Y0xDZl4an/C7v/GjgTnmy273p4R7VGtbuGin2zcdMo

6gbU1jyKjce1YqsV3DQ15iCXwwKK3Rwh9WoUonyV6YEywcticDTTFCYCF1KYWQTOM4GRrUJu0OfZP+nA

ENjAm6GHYHu5yQznb04iL5NtipArpbbtLuBI5qMucf
mISk9xr2nrtf6kWiPdLa07RCQfxvBGW1DUWO6MprT0wKXwj3m6leu7UuCKBZirmKL92HEZkh2fmilifA

9Ts1YtcQ/nmKOWOv1I8SDuoLuoVtznH6jWEw8IW4IgX7bsblgIoyNxYqsRsNOzpnpUvTs4hztg+/VBoZ

q+cOGn8MZY3zzYON/amV4Xh8qSM8JL3Pc/4SvvZqq8
+ccY4Tr3rWmsgGnV7Dtrre17Achv8EOPgvq7xulH2Xg+J6xjy+I4yi37s+7x1fN1+WoLlxRgEW2Hkarh

J00ncVokuXC0O7Nov+MlJZ9s00XWwMKzYtvB9tyLKYlqasfSchICznWIwQ5k0tvkgY+YuZZbdyeoya/x

YqSKVgxiIkd3XK1DLSoAkwSzJbl4xNyecgxBydOZLn
9nekASJipwkY5N69/CKe2toLqYn7vcVbmfYB0d4co38leP2LGU03b4aPV/WnjOpZN02IhPHqKjvHAKm8

g4I/dQw7xo6RrYyy/1UspaIrs/YCaKec10zCWa2nONOXVlAIojZtqOfJGO9z66Q3/1QPNmBfkd2FE4k1

RdKBf8A+s2hfrBzDtPtEKKDneh5DXh78+5CH9w2CTV
OkRGWRZyoeOzxHo4ZvHQDQZDifwG7s+yCW4c9zhl8Pz5hVJ265QBtlA9Q8IEr0P8/4MBPSEk7ds1tv4G

HnExJdpf6kKnQS/+v8e9scHUROH9mzcDuRiBDwL0e83Lw8G9PD64dkzhp+F6uKEb/7Wf/Pqms1nieQj8

00pR0JWQWUTaHJE5DiReT/PnG6jDA3ddlpr4PRonZ7
gLT5fDoxoEh7IRj5dlSHNE8Vu1++CEokfuxjFv/Yyb6pVxwVa+yZ7JAiJoZeby3SCxWSkh53Bx43Fm8Y

ybtqKoqSzueZAWcjaJy3Pk42cbV+5lwD1Yoe4yEeu0YZbQg78fM5hbi62S3zUQqTwT0k06dQOJSfAqZQ

+c+wK8D3sr0zT691krudYyEqC7dTw5y4W3Vd1iyHFd
vBSP4ms5kWFtyHMH3XYjlGsiMh40+Lfqn+jfB3KCFrkgq+ndEJHx+ZLz9nLEC5Brm3yjnk2X2PUYKWap

KfVTy/oFaR0vqXtlZSxwn42p84nybfv2pfztjrpIeIOWZC7Ygp+FQqBkkrbrWDDLStsqEg/QocyAOpi/

psm9At+ZPwuUEVpYzDDxKXUKRf+Mc2NNT6en9WmABs
7ZjTurVZxjyqGzDj7ky+WID3N3UsGx4WeNLDxII/ssjo1vRoOH5i4EjPcD38Mz09MSXJ1uBZM7bl7QPG

SJJh7BecUXUUwXOdAwNl8V22+u7rDXIMPSHd6t6m4xUiL+4RrV+85vNlKV2bR3v7YtUabLj3kFq1r9G5

KnzJvfEg11Gf2xBSMyDPB7eGCS7rQau060E4b9zgEa
UJQvPdpmTYjTbIw+A714TVnTj8qm0fSbIIDYYOpgG2BzOR6d7zfoznwkfZebqOjqP0WY75P6xSfFgc6q

ZSto8SI7dFETDT8QLqe7PpM1JkR6RMZ6b6JL6NjAQhcvlzGJkJjWWDzOfPvhKAze2K5rCCsGSCs0gR46

xFT7B4dNORbF8MZTKLYUlBuZ3aaNnjNx9o0zrNdCbn
gfnbh+kRT2yAPZxUE2Y9AFo+0eZ6CaHNoeGN9BMjBK1RbX7XBwB3HbANDxCiRvaxjzGF+cjSnxla8FCJ

fzY6B+bl9Tmo8mNh4gETYQeboeXvkAe4I0FxN8velCjmkMnBKH3Ec93YPfpFLwcyA+VisZR49XN3tul8

uu9p2vrSDyB50s8DwE847eQ+VsybsJZilGzwNeC+8C
IlBncgHgyx8BPGlzPUhfRvi351iC1WuY2sMaczwY7h8ifttRxHUWfyIkur7FA4gd/l7PkNe8axnkt3DW

QNti2ynNXwmAGBLZOb91U5MOM26Mg7xYNKK1tyJxce6ll8aLPL5CCwxHgXm5COrrXsdUzXJ/R66duBmp

wnKIxY6gAQTCQl2ZwxMhqJ3z4AJNwaa2mbqKdQ3WY+
m+B+U/zWzd0LasRj+UfvLWYSJE0UM7RQ3aoT3mAIPQbWZFXjY6iKACj4enJHuUfRgcztGxQ3W2CdRntJ

M25+nC6KlWCLLtngfuScpSXTA3YweLsAnF3NjAiFf4X90yTX/NII3Ui5qTB7QP15Ehs+aElGVZQZDYyT

hNajHMcW9z5CK5VyssgpPvaPHP7yA4mt/Zq6uI/cMU
HG0qIRXAUBcbFJOqRb8ENOP1ZQOrs5bK9Y1PRX9YEMljibRhovWyLTcB2N5XEfcoIKi77prGks7aLf/M

tGT3cSU219OFKZr0Z+nHbZR8o1XRwM43VjIUtWmQwadEtMXAooB4iFGqgZkTCsq/2dsLZi67b84u2K+K

775g3FaDi32RN68TvtEZmv9H/oG1DApFFw4fZ7UBkk
ano06ggaVBSWvdz9SzYCKu2ZvM04k6q+nccmtX0qW1UwXGz5N2afySzfVeNc0JTkk/pc8sM9vzvK2Lja

N5DzE5+mGHQcFDxawzh/XVnUWWXH8poeiTwflnDRtXZx8ha35LerxneNQAWNNVjCds/WaIrjz7tbc/Ke

Fi1CwZc961dJ2t5BX0FmkRbkCEp37GExfGFi7s0kSm
Wou5ZQ/y+Xmp39SA+DEjZLcMF2i1fHPnyZaRKxBvxmOVjSTx5guBiOplqmTq3i+Mr/MtBH6XV+966Cti

vZICBsGWbuQcP8sJ1awXa/Wn0NIJ+5plNCLIrfGKUK1Be3A+wePFIvI6SK+8L8IsTiXVyo9kjPWJzrKN

bsw5d7vEJ/MuYcRCm6dBaYWinXr0wjmDZ/nJ2j5fv+
pSPZMAvU7sgdd9E+WLdxbKmN3hzSUUVPemfDWMGxncOR3koV7DLHPbCx/U7A+cXFx8jp2VUC7CHYJA+z

e4O90xi13C5BOOLtKs5S6p/olqemYmOayiaPI4qqm06sgy56V33Tr09SA5KuntmzrIpga/RisQgRsnBf

CfT9VqZbUicwbW5gouO6ldSENKw+ILBvBpl1J5N8zh
yXRMC7jCLumXiDghYOvsc++Av4pN5p6Gith5eyncoqTdzbJ7oQH/H8kK8OFGIR/1B7XNbVKQreJ2PpHB

t8QDCKrlr9hZwHugHErg04GGnwdkrxWOqqFrS5mQ9kK177leBCtk98PmkqJB68IY+9Fw8Iovk8psPBfI

oiHnY6XJV55tS1VzkKYgak2M3jO257ynpU50NeoJQ+
BQxhM9E3lgz2+B0r/beopt1uL7Mp3Ry62ECfWDx9dMNX2H47OSfdueQ9bwkCb0ncCNOSbDsawHYYOXTw

u1C0Niguvq/tQMTdZUtkq5iMI/ViZqfxXVfnSxeyffK+bE05hMn/Rf4BQBEEURjvrUPiEITyVDGc31vv

B/pf6r/PpTx8AYyPx6orMFhAHSBs64NssIa8FiioOa
9OyiscPo5AMsuA4bXmFOrGjYB9IqmQlLQB6b7FLelUgn9tq72QK/6Ft9VVV5scMrNJypE/R5n7DMENtt

YrhmnSX4rClkaLBmFXRnVLAOAtCbV3hO7by3XM5dWfRT9UCi80H6N+AoSerdgdb+LQfDzCE1o2NgMH8O

zjU7veMp4sjJEP8t+1UaEnhvKWrwON72siRG618BhW
gO6RDdHzUuLd+6bLeUUqrtQKZ8H0JNvM1inJ5DVTE3h1czp4FyV9HrTPbSKve/UFF+3a37fDBWohVbE7

4ny3gtDG8BLI5uGri9110G9MwdGDh0giptcY42nkx9Uf9J3qoo2R0Xzl79p5JBC6KL+mwbcEESULtldi

8bZR9kWNr5feX0oTP/8b56hHWvuYBqy3Wxaouz3MNR
C9cZqprT6MhsIzgQ6O2SdeI8rwzfk1qOeegQVpTsE/buGFJtfdvDbfdwPtN2zyeYv64b9M8lwgkDNpkn

t3SgW3KbTYulP6v97BdIg2Qzl0V8uvuTOCoWzCIlsn08HQsQS+RV7uCVim4ZcJ5o90jvFW9L9ZuKtv4J

stTuVsAN7jUAXcV1YGY3OXaUuv80iHXBP9RSv5f8pO
1d/oPWiBHkBW/I6k1Kays08pCyZbjjpzVSj7i53ORmTv4WfJBA1W86IyN1b8Ai6Io/BZwR1qhcc+Se1J

/ung8+BHbT/ugHvGTUbo1xoCBwqmplRjz/ARZz5bIbH4VILvDGhuAmDayFUuLvOAkNn3uyzDH/DrsUl5

QrxMVWK63MjvYnXFqpeD56nuN/T59tBVt4FwETB2c8
P6nhSnYPtXa14osr9QruU7v+sAAJw6SAvoxeBwaNBfC3dz8RjTDjBFZAGsiDtRJmivc8dtwGi8yZ+/kS

7Uhk6NmNEM9Nx9bxnfhS4+pnkDj9hajxIzPb8v/laryA4tlSEyb6kaTA/hzxuh+6ZUOFQYKOB3J2txdX

rP2Mb3/aGb61bk3OX11wPA8MdmHjae2JKhhoL6/AVe
/jTcXKS5+c2nJteHsihjGb+W9IyrAUMmyg+9I+d8BcZ+NfZbqwSaYr57xMpNYl0QvVigeOAk3oazZ79o

Hb9M/UWDQHAL+JJXDTUvUBzhtkTsNi1O8Gbm7YsBjQamL9aUvt6R2zoG0rP2ZGXI+idEkmID1CKXUno4

4Apg/OcFylgXj6lgiCEhVVSpDy6Xbuz0ZpwqIR6+HN
vrEqCelaeZaATC19g5RfoEr0y8f45xIeUeHHp7eig9xp82n8XawbdVkmgwd2QtxfUEM0eR+FrYmKLiO1

OmYtX5TnPUbLqOqsS6FeKb/8ybPaDgyKlPHvQ9OE+SNPZcbcspUdYb8UU0NoYaY8VUyIb1fXrdLYuEQ4

oqmyn44RDgwn0S2onMJZ13BVcKkaoSYK6lCeTBl+fd
YgJOR6B5FvekS/N+umtQR601DVX1cbGmxsAudI4Yr1ApZn5q2OK2bY8WPAfeN0llVxexRsL9TDYpuoXA

zfIHzM77Mxu+utJQVz8nZwq6NfigX55gH8Dc5WnZS7fv7O7SCJdi+T7Qcf+5x1sZAibzvs7GfZUP+NdL

Ez8H/QZX101+tzce4CRphaB72ygmJ4ZRI+PUOFHaNe
WJ5K9Yzzwg2MgEjiEUXj3XiA61Wu1EfTLVXp6adCbqqARE/g8EYMbv8EoakylzgT9pm3g9f8xsI13bKG

dGui3uY+E0M4/WF0JItafZD4sK7ioT14+Dvc+Sqiu1n/8iLYs7glN6rfT5jVqH7QUs52e270Ma2v3XYN

aMm//b3pH/g0jRnCMVtyopGDa1i5YbRkZEmOPFYcRb
3o/hPc8IiQw0JK+Eb6ePUjR9p/61FiGkQFsxy4ueyu/R41dvfx0strMHNmTryaJojlLWvF4n50P4oq//

9DDY8uGw04We+w+iAp8Y8A7LrvZDTBnJ0aypmenxH++3+S39AjKZyMZ4u0ylQ+OZg3x0gxxtjOWwm3kS

PlGf/d/89PJv+P9iDIqqWYatGkcRVy149xN5MiR++N
LKP4hnlBS3iwG/pBivWqvnUxySxNaCip4gdG23roe4owY0aDH4EsznazdD7qZovJ6uznRvmbm4sepegi

CtsAS1FmcxOcVC0yloq6mRV1iWEKbjy657+wyB99IzkItgFkON7xjM8NEiQ1D8Bws8aImobe0ToXfdIz

/wA+aeH5pPe+h+MAsAV3AJZgZiWaIUFjVXxH5BruyC
ouqzSrj54CpdjWIMmY8wgoghva7ah3x1hW+aPWYBNi2CFX5lw5aNzELTK+CB63W2lsRu7/n26LFd79Dx

VXQtIM7MCoo1Uf5c+A9tE8ZrKZFYK/vTm9Rp1suMA6/5IXzTJQ0qo2M5rgR8M8jvID6w4B3HmJvgyQBl

OghSWQPup6u95yie85MI37fuCW4VN1TOMuZfSJknPi
0ToloO7/0YXRM5B569ZNw+G7KAet/wBSK1t++dn1BYy9bAWbwP1Yed4QziIudocpRQgRGITr9AeSRAI1

lYa2OYx12YmsuqeGEk5Y0vO3N+LEMSa7RIzqiM61kQjRpNDsnwdM5m3jfJloG3XRles84Lp/RWygNkhh

3FZUT8kSRObGhi8Ty7YqzxrnM65sJpfkSBPfhSY9QF
34mYVL5BLKECZZMhBKnP464XCoqEhdZfZAWtasIocpcqU/H10lO2FoXOF+APsMieBB036+mQh94nII9G

In91Kmks33Uk4/MMtiagymcUl1jjnHgBMTZGQ3Ojl9NcXVwz8C6bkzY9i6at+5KiQoHUcZCbNVuBcFdY

yEwbAL3u9yVSSEKy6b3q32fLoNmsDfZxIO6A00XZ0s
RK+QGJ2MaMMKjrcdTQa/Sj0shiGLzWh7kryfvm74mCM4cLlFMdLH7qffSUfJPhLYT2SWMVl+pCNKjXTe

/L+OaJG+f0K/CvsBaFIPgnKNLVIqD58ktg0LzUuVkOlVA7dv3moJ1i917c3j/ZNPrH9WAqxMOmQnI3KO

7SYFIhUXjHa2WSclXjncbBITamhQRXv0Nn4jXQ10dk
ggTRprHNI64yuVRRM7308hOZgkVkBpkApAnomNFrShldT1otM9ARFIsilxdPZvVnZYcxfR8e8C0M7yd2

jyruOvsV1loLpcDWUNWr750nTrdL4xfW6IR/jCXzlPqUGDGkKxNCVFC9zEh6/ag3k+J26z6meidcv0yD

ApjxQFBUJZiizBrxBDMSZV/wsMyc3nmQch+h8crqsQ
7W0HppX1n2Fkp9gPOzK00zO1Lr5IZN+6/UtKKsIZR15MwSH0j6rS4oS0mKGzePmCfXPc8Nzxd560n72U

Esg2ArEZU5k+eetx+WRKFdpaLcZK5nJxAZS3enjeA61wfbu9/jGRFgGmtkhcPrWPipv3VCEaMlMrsN6D

SuSWB6VF7p5Tij2IkCnYJ3M48gs0IjVATMgXTaFVT+
9+Kbd8MokhF3iCp3rH3iz2EElkaJP2MnN/KEJQOvi4v9z3WiHoirbKvhY7vZQN2M98zMCrUMHztipA58

iGb3lVNyToQlOPNIetAXr1OLZoKQ9k1ApIIY/HemJTP5H/o56DDcsPJe0vALEVpx+FmOOUfBbuAHMHmA

LEv99+Hy8VGd7cRorjoLABbdjUY1ugBL6NLl+t7f7l
FKos1kNOWB86SkCgNUvtC3089197mfL8747/NMSr5pBtjFJ4fVj199hhxj5q6i/+PW9OL7dVbgDOnfq6

FfsI2630iFOqh1/kvh62rYGnQF0es5krOILtlbfPMQkwUG8Ftc33mbhOC4J9pIvRumKyxHgLbz5+lZei

J8YwhBb/Rv0WVWrbB2Rdu8zbCjeWljYWiEKVuC44F7
wZkA0cXSh2CVLIzn/xF+F96Z1QAXpTrN6xn6ve+f5lcwc34wbCKQ97ZWnYcYUqTDAtG52CSFE4VgCIQ0

76wMRbb6hMm9C4yPkZkpI8MEdeUycbNv3Uok5jTIAoTwqzvZkhbQ06qVvPw4nc6rWYCbWMnD/0SRKuDJ

wzHukEd7qeQp1yt0FfR3HMwLW8EVoVGbgYixsmsOVu
YaMPLoGrAX/y8wBf+U4S2M2VhvJ5qOc6qZSbnmfAbdeMa2SaLy+x9yFk43lVe9supmSz3Ds/94TaHVHA

Ijetj/2AJA+QyfybXsGvAPlXT/eTjy0vyFSZMEJ5WYWoqNr40vb2kMfHz0UzXNYZf9cfuJjeqKm+zvwu

9YYRy9lfMhpDwj8d/249h/vGilsoBNxIt1Ty2XeVBQ
wD4WZ0S1ZJE08F78/ErXWHElMqJRTgXB3rydkTdIJtDU731lxsusFLa9u6lrcCbdCd+SGijY6PY/9uqU

Gq/7pdrV69ERC5gVg8/nVc/NdJxqhjtL+tHQliwJGL/W3nMmcf1yz5IghahHZW7nd+352ChPgP7124Mk

/0p1r0Cp/9VeF9ikqEOOom8c/pEK9vVGyHEnPRvzVh
Z9nykyJuaHsdgqgQDqytYdOZ/M1EsgfuyeI3623VK2cFZokPaI4gw+f9JorB+dtmAMTyBhe9rbHndiyj

padcV224E1C5zf2Jg3mffIz6vH1z88lmyJb4JkssqL8hOWpjX+mqmMBNyvBD5TdBbh+P0timrLSGJIAH

4hi0IDZ+2hK0CqkRboy/EXZO9LqljtCUpYCkR07Eto
zy91pd5E0JADztYEbevX7Fgjrq4D/FqlxabAw05cQSv+Tc92G1s0ckofEqTjgNv3rh5iaKkW2wAE4NkB

K2pRZlKi6/kJ6Rn/WtN6M20xURfxf2gxnOR+JnBJYHIxZOOT/q+opg1SAddOND3LTqG1fkXgfRw0ttME

r6Y2QK0244arYg9flbiLMR+1KL+Dmvresw25BwriRq
ZyAhLuaxn2h10y1IxuP5kwWA8v7X6JuPKgOgJ/cZQN/smnQi1CB8QFml7fg0PO/xvp30j/Rvo30r+R/o

30b6R/I/0b6d9I/4e6C2x29H+kt5T/G19x0a2X7sHX58QFlIrd8KeTejMipuLN0CyVGfHbzufSD01bYU

tKzycYyIFS+6svjxq1D77EVD8b71qi125kLJj/wgCP
/aPPQKn9Ec502zKR7aof+uUVyo2cSED9FnE1KRE/qm+fi9/lxnGo0wRmg/+QJ+DqHVakcIvv2fhd+4Um

uAa4+Uvp/NRJx0vpVBvPFiWjU+KTpVtBPs2TyPrZAe6Je/mRSce4FpzwspZF3zuEC4RSKvYkhfjJKPYI

/pli99OLgyTt45stDUAPLQKZwXwiEEEzyHtUXI1Jds
S85FE+7ZAdYWhVM61qsOk2pIb1GJ63agnZ6Tm6gBa/kqul/n+24tp8FXIkkLu6RtudvvZaA45rKHOZ82

8WOpbe3NOjkyXJbOCB6D8PrugOI4wWbV5WjU6Y6ve2kczSTWlgY2B0R6L3QMnLxhbbCjM8IVohCBJpfM

H/yYsW7BhiUKkpm8KqZ1RrByejC/nTepm1vkX7WsWw
DUScXnZo4fqqpAv7KHcXur+6Wj5iLqAu09NickMjWWQLf/E/16+gYV6TRIqFWTfsR7MARxxkv8rfSMaT

yU0uto8DghnupDjb+jFOH+IvMiXlmS9doiqm5/b3FmG6DkYNz6gBZtsWcnIc6vJUlL6Y2nGhq3abi4AV

V0wSg/gIw95NV7p7Tg4vGbh4mdEiM/Eb85DYUc0VYd
HUGUKu/p7qb3a6KwXLfWigH9sfWMIZxJK0Y/EcLh+sg7KxgZFV47uP14QsJK29tykVTSLMC3nk2c+I69

JE7h/n8NFs3DwtP+PWN4ldyIc1joDoL16O5LDTn/f6nKV5o3ZtZmfFtJpzLl3ZXY4CP3Jopu+GuI4Lb3

oXm/InNuQ2okgpMY1YtW+g7sn0gBg+RaUUyOY4Y+nw
H7Elxi9ig0Vx7tDs0ogjXvSd5b1/BB5jgSkKF6gHoOHh6nVwpFInGIur4n36th/asb7FyeP236vctLyN

uTiupUu/ygKnAG7uofn5NcYZZ+/oIODC5pLVhhrDt3qsmkw7gIGVdp9sU/pTlvINfC+6vZpDmhHN7M7i

qLo+fAB6RoDmEaPxWGA04o5C/kmQ24QtxrRXFqfn+6
fMY6V2u975Vq48wJIO1jPmV9z5sQNs0kYoBU9kP7qxRr2Rrf/rBsAdz2g0t+6ddxxfUdg/W4coknoIYt

/opEcHig+ZapcIQMkxpIWpVXRHPjg5DTjIQ/HDWp7OYIaUslDGzwYOciJnEtDKxBL6bnnGy2LLXMe34X

38lNwkx9D+wTJ0uKscOTvSF0a/ekUBjRnXIWhYLQuP
pFocmQnkk6Xx1Q+uDKsa09zSVU6QkFj+p7V328UXw1cIZDoWgHnpF/SmcctI2xvS8eSkS3RM47kuHTOL

cFqnmT1OhWyXnUaP0u9C1ZdM6EQsTho85EJ14rjZOuMkQX/+3RNu+JIS/Wpl4zjQoxr08gxUERPAfFa7

8W7twkoD9enYqwbM60wvyBcBhzdYHxHQsMuOaTf8U1
ggJKdJm70QvVf7wSyH8AQdi64Ra4Yyz8AwgSBhD17xPKj0mN/UWFX2zKSrAmKlS4OgobE9DIVc8/Dhw+

5Nm2GoiPUn8r2LJ2tpFv9tWnyaJ5g5dmUMa4t7BAoUtaJXArrwKGpzTCQjLmADGhkOjT4quu5wQABVPJ

uzDsmdpUl+7eSF4SuLV/lM2i3kdecIUKUVpx+lLf3p
2GJT7f136CVDew1wi4qX1Qu+1t6AhqLvOp+aWcn2ODqm7QoUfFbHerGMrzjzjsE1kQVQfv7eF936xz9G

Z+Lz/Zrob49J7EzqLgn+klFwxYScPN9LYMdIT3SvlisIOVhf4Y+0YTp/RUyyIZfX6R6f3UhdND+iusJ+

Ps9/W0mvh4W0PqY8LRadMCzEaaUcTiOGSRHJPS5JY/
NNFtk4za7nyE2UbdvPYVhIDUcwlc+7m9MN4dsK2Bify8NvKcwPlk8VI31X9dH7VOl9Nkx93WHYA9zrn0

7UwFsuIpKQTimb5K/ndYcoegq8UOCuKe335zBOeoL+5gPMr2i+BujS1dqpn64hwsFTxNJxXmyo2K5aFt

lUR2OhOxyIS+Q3np3FI1cQNK3mZpR8b0H3dHmTeY9f
mcKoDx6LsnuCc5UrAzFMuEN64wnIrM/zl5MFuT30p2C0Wf+JJ9JiMg8CryqaUWWBuCtVV/L7QaAjTtdk

xUXsPFsGaHyxyS9g/bJW4mmMaOYLkIda2uPwiHBUdss90kXUdzrgxPCQ3ZyZK0w67Pl7d4DdjgqXJheb

8x0fNrSYPzXbhTbGO8JU9JcBqcTJKroumbMR05w0Gl
XBn8lZEzmveXx/jfOapt/2vN7PL09ixKvwPGbAZvUfNa9VqoZ2qLOel5UBsJw4WZNPb87E/5ZefnCF2M

XlaaTDrVngzUKzSYbMZbdDoaEBYZoiJ2NXiBmDFXLFslVSZ8LjziNTLKZmoCeHXZhVpW4y9bnh+tf9cM

NfZ1asrn/Pp+3hp3u4ilq38zY6ik2v0+YAvJPGAShN
SX+U14DwWYNICrbqmMjE7GJqDbNiTrpZVnZiv/OyvzqVHDPc/zF8VqyvtCeq0UmkcgBH0rUpC5kA7dv4

xB0nqAyWKOXi626QXmCIoOezgzIPb6HmxwtPyUc8vG13uXvgzmX4yqV8N1zvoResbeBWps0wliqb+Jwo

L0Z4edhwp8QOX6k1hGJqOGZ6eWb7WpsidRy+YnmdQB
HoKDCAkOSGHYiRevdUHRNHDTZ1JSEwrVw1gvjcpaZnpI+w9kYS5bDyiHrYt4LN8R6+qjUAts1t2tRZC1

fv2moMa9ahoRh/yBJPhA/wJMiQK1rfpSqeEQDoONPF/Wrp4ESXwowvk6jk3zVSHfuOh4WLRIyn0BayNp

QysGjRWU73SJS6YfWVVKTdJJdDnAbfP2vA+295ME1a
D0uBY6psAjKBX+WQ8vW3Wp9w/4BacVfvmesE8K8T9nqAPXRpQelcjRE6M7Ns3VAQmFrR0ADyNNlcsKbe

KZvoZCOqzl/MpK+XgYp7KPFOpzCihhBUgCltRhEEdGqNdJjCO++LDXZoW0q0AhztCiTIHovz8aQV06mG

2nMSCkTLEOhRwO0/IbmhyYf4z7pk3MBY7mj+QHmI0g
qPqe6ZnWzwccgzCU8wHDgeb5mZpFm5c26c9GVmmk2S0/restRNi2ZpVZePaBMAN9ZQyOVAEToKA2Zify

bqES4jZmt0L2f3gm17of0AeqY7EIjvNVSm7xDgzRsCgaQ7gwx1wG0XG3nhFW3+ZHPRNe+c94pLlTikFO

0sMuX8nooYQaKeiH7FQ2tr3/nt/JOBg89j5uBZiFIu
1K7AK64TXGWGCEAvPzA1v2iOkEiwlQjVldc0njvLivuN6dCsNV3pfSWeLJ0/M0+ZKejFLa4ur/N42TDN

M8w3DMSr/t7mCR14AfOGIo8iXD12IQBkkAuATzsBBE64YTqGJkuDgOqKTW+C9VDepDdLQ0kq5kRkVNE8

VrmRhtVIswt7CuLwOTj9dCNCfV9gfQr8U1IeCW3E09
LZuxtogrnYcSqlo6f1bJwTBcF6WptcNmczvPwVGbvEOnKv4suPDfMiDcWP+L1Uh+mzwjTyi6ekQxDphg

gMyBqZ4Ak6bWP39hK2gILYGtgy8FZPxF7Yc5buQ03SNO9VwM3t2gIpboh6+PcfTQr0Y9K5mahyC0fls1

waCnab4XDdpKSf91ZYbTFsGnB/unw5Bn6VdHv6efi/
u1T3BGkXnCUPSWzSUet0xxgkiQ+xRqUx3/W7TUJd2oSpoOzd16g2tJgEOo/Y/YZpt7tH8ncwbhG7QhJM

SZH6krlCYP0fa7EVdn4nPTr5xUT4105U7MlCOdyZnvXyswAbpZT/6Xh+5dTuFReoiNqEfy56fuZlevD2

sUeQ9OxBEZg1B6Zo9UpZBrJ3Kz32IQvCXb2EAZWL73
DUG7ugOKlkmwP3WMRHJQquGBiZmcSShwSxVIBETkiE2Nho+AbbSFRj0KnjmL3WSmM0uOHZZLSeOjO/2J

W12XdVv/5bt08FBZ/KYh4PIRmrdt0jfR5d8RnaaZCdfHPRdkfiqqUZ7KoI1mKn06Bfo+1vKGh3ixTSsI

LvjowDvN7Q5OSvXCgtDZznPCbYYP3evQ6u+L9Z0wiI
2DreOi6sxRgiE5x6h2imz0dv18svXtub+NYEZRwzPNWspHttXR+EfCyVHkXF9DKQLU78/jpHxz4gtNcY

+YIiov9/0ic0dkHlfOXQmBWwbOXYtDqNi+HuiRXwd7muC+6yvXsDTqmaF+X1f19Cz8/hULTNewgLxdZp

MtRUQ+NFIDTs1dpuXYOYyUiMzs75PXsOX9xrBCMXHN
oo/dVz/s+j+ZUe2iHv+N93Tm0xr8frFSJFx588pYoBglnxJ4c+dTCyL6vKKOy9ZW8GkfNJKEWL7clyuw

J5NoXsowHJL/vyze2hfmX/2Qis+nKSpF36usK98lWyfNBGVBhbI8ao3GWRyeOcvg54egqTBIg4Ej7IgF

JTGd7CHcjmzA3LzWSYExFYGJJAlj+RPs65bTXIEMvg
DzZYNzXMMjArzEaNTA7pcYbA4e32hGBhPiKwFKznh9laeG4jopZGzGJAhlAwTagohOFMaYIwms/16OAm

ov1uHj8XRTZxWaU0Dtc4xWH/vXJ8ktuVQ5MvsHTPUTsR1OmDa75Vuei904TY7FEG73AxJd+qEjxGiQFZ

4/fBDDyMeBxRt7/+ZA4xy3/bMucyrhGHY4Rm3wQt+v
esk11gPwSGou+bIKCthcRYTld6kTGFDgzxGdMewbIJdMmSFCqvCr/7qHon/bbyfC+LQglfnLmJvd4cW+

DqMOCN++eIzJy0kySoDKULJqyX9qJpHjtEwgddmqkXwzrOfa9cRDC97aU7apjwB+GuVPSwuNMHvBgo3q

Koi4zc8ALFlaNyvMz1xJE5EJ2GZUE8Qy3KkDrZfN5g
zAZY+5NjppOntNwVDVuhHuzELxMSwVYzitdil3GHAf7MI0fmMyCgMQBhy0BklHPHuDvN9Xk8R/NYkc88

Q4jm0lIP+Eo2bMJ5BFpdNhUwzZgjM2Dr5dSpQKukam5JPgnQR8/pq6yKH2Z2Wo2+lzKCTVMwBvzciL5b

DeB2fhVxjoIfd99lBqvxoW3OjzaGRo6XIeSjspn5o4
VauH8fU/1erJgMMtTf/8eCV+F8h6zZmLPie9/Z95hoVtNi/fjpqyzBxGita3Q7Ln2uNdFZGRHD+mnxU+

RC8lB4G/EIVJU03OIihr/GgfH9i8dH+VldnR48bi/BnH/hhv8kFu0AuAdpkVCaZwjWJxeXL8tj81R46Z

TDgp+K8aUKmJwMLtx7elGatgcuqHhjLK+7Sr094R5D
UJGGsM3DKqg29xsnaByi3/7hVilZcdqv+gYvn3Nq7o89xpoTRkL0hKu17kFb1Y4gih0Z/59ddBU+/qLZ

M6jqRAZG7WHermUusTQuke7pZVSLFXOm7SLnJ23nquL7UqGrWYgmD4hhSy/bPq0u8YWR950VeQG6UHJQ

RgDO+LTsSiowPGYDUgANQJpBRw0wqakwWe504iYJs/
dhzgv50k+u7SOjAvYiwphjxeGP6moCQXeDSaCE5JRJZ4FaYiVd7aOhPJap01X7HS5c5+G+ybxL/U9wtk

pvsUrEgoi1eBD9z70cS/blqQyr+Sypg5ie8AcSii5jggyxmTTUXltatfSzCdJRVADdSbPMQgLH8NtaJp

/VArJxtiEvrgN4SGMcvuGhrFm1Wcta/qkW4L57AZgK
5YJLwJSwuVArwc+TrfxAwJM0XrYbU3Yxf+RZ4fgTC42F5ebn4vQKHiYeHtjsUsfbbUSMEgzj7yP35jUY

QwjZRJdsP5+hlczYhqr/7abDeLv259WinyXwkMtuRKMv49vmmp5SMRTnGx4Rv02BjNZhIbuIix6awvve

0fN3oEss1NAGrV9xoDid7Ko/Jdf/2PQe8YuGl0Of4D
OoEgvqQPxIuU1efI6dGRQzbGkXimyWSgbFyqtvVjf1DoVUNca4j3AeLH0UA+znvJRh+fWw0Gg6rNFw8G

EJLlzeeLZ6mu+k0TtZjCpnzMKuW0yvHjM3zB25AAacUl+pvAtfpByhrLEJTa3nyT2AUJpJD51NY4ZBX1

GUxSIZZf1MaGbukc79G2YV5FUdRMxJuFYvFntp0cDZ
+TfwXUkX9QGIpwPIj0GRR0kY2IGvi+0x+rJVZP5UhN18+z32glVjgdh7I/3qbzUUKE9v74lI11IgJKV1

xYuE1Rg0zyw5PXEC6h4VQjgaWi4BZeGIG8hpTz6IWMNNSFwDULoY/iqaNajfE80OOdsY6q+QgBlxNysd

HUwR2QJd+/s3cvvV5IBEXeNFOzegiaVjFbs2p7PmbV
/x4bh4YrWVafVoVlel5SoR0U4AUVVN7ivzRV6QKkis5hn8Wj+g7bvL3vsq4hPvNh72n4UM8U2kcN3fA4

Y8ttYwYoTohKGhC1Wq32Qh/gHLiuccBZIk37sHNXf84obGnmO2ikIs0+PhzhyLaFbQdZE4xAIm6xe68q

U4ul3/Um4/eDWCHFifCviypeu2xOOe2Kxt1ElKGqEh
jgEGZ9KXPTKBV7U4LY9RqdV2/Vur56VFa6LA/wmL/LiC21bIGfZjMx8MeaH7/3C9Tf6/XG9t/4vrLTsB

Zn1j6nIHQStMouMkTxSDbCTLg++FmkmLR4BRYDUBjqOGUwQ02l5b7COkwRV/NVJnDcCXDx8yKWx26yak

DzrGGFvsRdINbuBmqMEeA8hGObj8pLup2sCm9mmd/o
F4zozpMvYGtarAO9iTcVre3m4bMZxC8L4vg6t+w7IuZ5nodO7LK2k0w4b39n7i3nM+tvaZ/0gR+jR498

+MlB5I2A2RV2o3e/j9hiEcc22sto7Wld1YApFxsp0jCXW+nTo0rky5M8RiFw3tLL4qpauHUMkkj6AXR0

A2F3ofqIwA17y+LvEqIN3dlgzmSVXsUYx7b3rXvszo
Q0Ta+kCDQiWq8cWrd3rGJyGPWL88vxz+Sjc0uWXpiqQsog4DXUvv56vz17qbtjb8ofvo3xUDjFcUmmfz

5eTPIolFtEqGsUOKdfpVQA8HITnPQWOXvT5hY1YyA94sfIC+BF9+m9+/61B6SVzOnbmElkmk8Uxqx9yz

8+ui2h2C8jv7r6iT7TTyOP+dwwZfPOU50Td6Wqk/Kv
EgQRoexEE75Mu0o2xcXyTrQDfVQK0LCXDngCIGcAiUV9PFXllr5WwK3BXcwmyD/ChuJiipw/3CJOpQvw

ev9RlQFgw87EBUgX/b4FU0H5qNOsNxeffHQOHDkWaEDkEN7NksmuFGz2j9XkRiFf+Ies/axBAEj67a3n

eFclGMbS5wvc78vZxQja20ktPqLsa9cCSNS3MxEfCs
cfzLNSGCnqB1JrhFbVxxqEHWTu3SAIdAjwPlvt32qpCq4e/kcPNV8gbMavMSIuluohleFBRYF4/bN7BH

2zk3r/Zekt7ojHS0BGT6pMhb0fyplKmTS2wKotKTYPlv1FCOvRPDzbBJgxr2zcg7RFNxfZTcibsylkMu

s4Mesy6XcvQ1+m8B97/pDu8xRqJAB7FGIdcOqcq/ABHISHEK
yMJVhvW4oN3RgWHZjVLj5Nry13JumLvOdtfmZDHR4is3+a981k1xJva9j/WpAYZZvwEsFFzVZMOTwz5q

QspSVCBKuZRGcsZEPjL4Rum3G87hnzG+/TAI1TdW5zPG2HkS1Ik9OSn9+MfrQnPFE/6Raz/10ctma0zj

9Hk7d82RwSQLIOC0pZs5ARi2POBXJJ5NTAMZ1eVU3Z
pjqW/QzrfRdv+dfBe0FVSNy8uL1fbgH5BvffXvVxJr+NaMTBU9KpQ/BnpKhvGDLsPzfoS5SuY+pyIX/0

m5Ko0vo9YPV5O4LASeSRQP2OJ+RuB+fu41Lcn2DQnLh90ZJ4c1RJbG5dcugme4No6qv9+LbyukqW+FF3

/IjdidWQIBiNFGc4EruXfK2i2FebbZfvJgXhwEp6nN
PoHii7rZVq8fNJ9VkBHIeyTMwdm3B5VzCIh1Us55v6oEjYg8o0hhblxdk2BQHYKt0T1PNQ7w+YsvRXGL

Drmc6IuqZMDVRggtRZhqzyoIomameUV313eXEl0X6QSzkrCjUHK4EIW5kekK3gjAxQFf42fIj7cnpqJ4

H6hc8I8hAVHRtdbx9uBfqtGh8m94SMivPfOd4WMdrY
UiLRyvJLIVJxWN2tLe4QY1/NTCt89CQ40fH/54Jtt4VLyqPNlxSuCi0n1Q+VvXMPTYA2T9iBF5PAmxJr

kFVXPRIlgt5TQEKUj8xcDZznq+GvT0evKm2xzlh/30nFss80RONk4W178aRJZrRG5xHUddEELyJVHn2+

N9swMbppwES9in4MgWflRwk8UhjdW94mcimhBasaWy
+IwTk6nnzzyYMdoMUoQfCReeGbORPiUaLpSC86qTdJgeMp0f/Z5splQ6nXkhrLUOswIilTzImK1qQJ07

yrAZxz4LPTASHZSSeZ6AwqPyXQZnATCVYLefQb23PLnJ0U9m3ob3JVAI4fMYZRc6LVbbTGGthTnd8t66

eegtchWL186tIMgFgn3Pnz/971pclE245fEJa6WZST
zLbrfMkSyOjqz4it9q20nw1VAgC2oftYH+buzrWx+jURB6hxearzSypkQCwWxTnHvLwbk5DMf9rn4Nuq

uYlRHbw83LqELCCGkhcun9K+6IKW6I+6ePzOZOmrlPrDNyUnCyfRGgKyQtCPGxGdPiSqdyLelKZWgcIr

F6TaXrCpRnoLq8rwwraciA9mORjhdMQBiQ5lxG9jC2
9ml0WEcxtEd+ak7ChOg2KipMFAWlDNmKyYruEbh382LEwig3Ipx6LdP4Zvt/7UtkrxO6ZMnUaOfWQH9X

Moqb5GTEuqSUC1Pt5rxkYfT+YASLP560xrG8zApKm1oDyCLBF0DUFWhNHhAh19P1/ceq2yDUQaHxyJ5i

6noOzBdlCPW4nRKh/523F96tpJDCrio7gd9AOCkOm3
+go/T/r1l0qGzIAMSN7MCW1maPpWQfYQ2s5ZgklX8L37BbqPBUJsyXAzmufHET8Qp5IAv2hsj4mTr0u7

CdJ9mOOiv4evQbHDiSeXnlnOpsUjOef8bzkDM9w2Hc1KZvFyICvKK/jQStCSJT/TlsxMXVGbaK75i/DU

kXykE1Hl7wJuFXWirJWwg7Bboc89V7Rt1USu8uMeoW
wx+mEMj7bijLk5cFjXfejQ804ckldH2VGq2JV1kFAkvTMjs+Thxp/I8+DyzsVIVY3vRFY4M3rLN6qKTr

osCGQR4nkvGg/Ot2/Na77sv7NzkHHHcSs8C8tPmrvhlIL7UeHS9+LsO7ezQTA9XvKjodmeolWx+gTrTI

CvEb7z7+7KC90n3nCuP6InlmQnH/qsJHXrxodQjPsl
JwF/1CZ07s2kd4GonIxoVbTC0Ej8ti7qdByYivuHFeF2wOYmBMi/d4qY7Ee91pDy88rIPpW3kW7adlwP

wnFnROACEQOzmMfpqreqNhhoFRfpI9NyScfLBcJM4Mytg4hUmv81lpI6UvpgymdVTX64qSyY4QVi8iDX

oHdgtv/s0ne0e4PQkRImWLm+pyPFi2yCjYtvIfawXx
gadA+gXF+lvyQFfS9GzoNJdFg/aDZGxODPX/ikIdnIc00I1DGTL94Onz2Y+Q3Gb21UqLgr8C0olwg/q4

RpGLv22n5mbnpIQbe/cqXow61/bC8tBH2YUj/otW2XBw3QO4rEWBacT4oZh1Kk6RI9xU1vYgF/+qbD8w

Sbkzc4Mo6Sgwv8IRFU06QrRQDiraiiXJSLiI9XF0iV
KKau2eexdBOAxWDutvGorZmpIpGq83L/euSfxSXUKdDWQpwv0LWa+RZjd1sGHrN9TNg6MaOMefuBKzmh

4ek3l0xFeJtdwIlCOSApqPZSmIuTz3ntx0SIZNgNQGJW3A5yau5++1b4/jiGU9LFHG3pYiCsz6Yw2bls

DKsYpTSr+NhE2FH3cCifOK0DpNKjZrkMiJZlPuLEEf
99rQFx5ComL8mikYEz27edIX6aQNJmu8chuH1/d/doPlFKfyYbt5+DqX5JAGcmJcVO3M48SIehysh69a

S409esfE2hWn7lncjsZtsdi4VvsL1AGavl9buWLPcCgFplBVtsM+2LhjRMdY1qGVUNDXZi9G8+A5qhnH

dgp7LUBlWzjunEZ/lPE8c/YzbzdKZE31rl25FXsyU4
+fxzIc/MKBgf3Dn5LbRgTk75Fs3sw276j/EE4rsIwCrGgttD7M5Ktfvtfaqq4du62Ln1FJVWH8pU5000

oaAlUPl+gnZvxCtEQG6PT9Tjqq5o5cdkhb7wHTJJ2BMb5AZl35SHJSXVIekkXWROIT/+9E5yy6oe6oOp

CsxT/SfG4xq6hIeTG/Da2DMQHIM8VH4082gxQ59qlh
7cz3Reyad+ffKnc/xLx8IQxpVHcjsJv4jzmifhHRKxInYYJ71VUnMyYx+bZWAcLPA1XF41nO+vjJ1zJA

UbIxZl8zA8/wc1ESOrWv6jWiB0dGHiF+Xt68O1r4dqbC9lw0j6NNI/d8702qMP9KmAmEq4baTDXiZVh0

hJrzu/J5CNSyLlIGy+U0Xf2ueB+skcPEn4FjDgkrKT
DPemJuP1Vl9lPlxU7Wc/EZFithbLIC+sWC7/e2tM32y3A0TTG/yDfGsDI3N0fcLmwo6HRc5GjiSsZ0FU

d9Ghy6teh2Et0UaYqPA3oo4+ORNHy+L47fvxHxbRYxBTNmp35xRt9v2lnlf7lWlht532fT++0DuLyJKI

i0PkPgYRqFGLlN33oMklto+XSxDvIXd4mbj/glXo0j
s1aM+6HK83x6EqVHUTxq4BYut7IO63sM/YoOSch2Y2m2tH6lpXZJwtfoAm5MiW8vztmkIcNLZse+LlYy

1v0SQKFRvCdsnV3ezeKKtg59eCFTVS1iSMXGfzaDgFS99Ju9VdhMs2gAw5YUSSh0cdQbli8w/3uyHJEn

Be+a6MikPMH9sWXHaJilDFLpoc9um4foumlY3K2Otm
6h6sbqmscO47eVyFSdD1lM1QnecCF1e7+a5jTDHIWav5k2W8tmBuOjzFciDl3WxH9mJqKs7tdlO8Y/nk

CPLsVWhMrovn1giomOrh0h25dyJJxnEq3kVXv0ar97MbQmPxAXO3M+r1QyCzn4u4Cr/r4VOrEC8RDSkR

LXSc1RbG/JCst0gEoQtIzVV8/qY/svFcn6VgKmF38A
tLhFZRAOZjCz831byrnSRt03TUCnK/p+dQViLBuDCFX24Ic1Lq2UrruX6kVWNfouveEtek8Unf4ikFI6

OYThmq9CutDzQP9jB/MTVtc0UeO25bHnL32uLAFZ3Luv/UXsN7im23AtQswRlJuIKFqkWQJCCKiRPj6l

Kp5xyUGe8D+lct4fEfQB5KvF9jGu22JPcsTL3wBlKz
EPP8R8/if/H/DA1bNpSdkCKNd9AAEtZhUsItLXfKLRHKyy8p4T6TeLn/oao/lE8Snkc9T8RHYv2L1/e0

XUJfsRwEAuRMnBqRlGt73pV6R6uE70j8oJpjq8hluBUy3Mf9maUBM+5+oQ/NdowYt1jD88ENdfQ2MEMC

Bkw+qlK16gFHgGp1VAGrdmxz60mZFtOxRlhkerU0z8
8N1CbN08zBx+UElBinfy9kz+k1Ktup9NR1DPxd6G4aZ02MA0FcAR6Ig7ZpN5VGAL0q4sQyRW85rY22Go

FmNSsL2VoZRG7oU17P0cAur6t/jTVpkkEiYn1mT4COfL8B9SoIdUdVOiJkfaDhMH8LOxR5B3yNrQ7nyZ

5o5nONNdFglNwADUKEnh92pUBzpY1igVJhp/+5YDRR
X1cyX10XIfzHJ07F/NAk1wVFwWNHWKc3+SDWrQ1e0j0xo8XKdL0jw58myn79GYR94P45x3PpOUSkp+ws

ClZa6k4k2oz4WIaQFkMcW6EjWHKUgKuR/bmLmd5q3ZHYNrneGNk28qEo0a9dLZ3H75y3CorYbWq/1e2L

wFLUJpeGYCNJO23IG6GcuCmeaadcIJuNJyQB8OqWa+
s6RY0wlZglRAp5/r6qfHoUtJfOL115FvPRIUcIWe7YQPftmrm9++dACeFmGOv+YvSb7R3EXphYpgeeBF

zr1nrHgD/8t06Hc6LK7tIZwrlWqd1lsw8NqtFKjG4MKcwVWtQ2BBxxp6isaZt+eFWavAWUE0c1J2mGq6

2qGmaQhtW04Wg43gwHJF1qwfFOkUErPJvtgzbXEffs
vhYVrOoDjvma7xgwBxQMm5gCsmT5TTW9l9cB24ZlyK1lVI3f8upybzPGObkWvV9vZDdcH0SmrL2j9/eH

4LV3XBECWKBwqUuQsnWEf+oep/weV/hV9X3IXSCg55yIMbEbye0VGwIdBjkxJm4SBYu8z3YVFv0aLGp2

2rgHPPKvugfY/J7EdpDm6aPikU74gvkc6ZrYcm5fdc
UizSM6+odoPq04VaPZqLwB1w7GXZgkRlLq6ZRyxcvmhEqLxFqJ68tLI1kt9sA7ms85WIviPdbXMuaJcG

770wWCT9BB4ANykAF0cVcEP+snZEu5+FvzpDAYIba8H+ovpl8vxLPEUKZp68nUxjjUoCy+rCqJKDDo9P

6yKws5qSQQyTWxRG7GbSV9F1jfmpTPETr6PU8CrLO+
w/8ZDM8S1U5edtz852CYVnnJnLtciNjacRquaPM2/gzCZ3N9vsyfDzFfWl8CQ1FAQDa+NaGLupg8243z

7huVyXSEf5V9dCN7XRsNoMUFanu+qRQk6yZj3aow4/bH2rsnUJ0MTdaqBTVcUfjNjnYFdzjHglKA4a1z

c6ZBYNWI05ou3Gb8177GMZSYKiNZLgrdnG96YQVu8h
V6vmtYDiPqYuHXU+CsG/B9mce6rZ4j8R1oEgEpAcG+6t9Zi05nwDihd5bDDEGI8Hg8bApKlieVsj2mEn

vg7TXKSJHeCUkujJhXzCg2JD1mhyhjz4aTebEA+Ac/rcblpJfghahjM4d4KGiMM7mFNKUCBmQH7iGMQx

SQ/q8k+vYAyVhbmf0q6FHTuPsdzZxRKLYJ4bPfoaN6
6aCMSI76OzHOm62RJNkxkydmc5penUE0YqM98/qJ3xCvSTcB8FEGdcdikpojfDzD9yAsaOFv+bHu6vkl

cvtZA3aFY3GnFw/M5mP7+nfno7ATiAGvnl+DI0OlRabwobJ0s1sGYmQc82vBCyIfL9a1gx+XNsppUowh

/wHgjRSvd9UChgcc4XWlMD9wfzoKqISbdBG6eusvXE
6T60irV4lIbpsU9df6r4qhIgQtFstmEA46NOL458GlqUyqc/4ovoVOdCPF8Uiar3FFVjnrCIvm6SgHJt

VJyZltW8NgaVOXoLxeeP1hw9TfQ1eXdQ1zk+3HPOAcvMqQRR6q5+HtyM16lYhxei3Ar+oA6HSZN29vTE

j1mSoJZbT2GH2GZucJmxI2wpT7cNiab+ZYBVWcheMe
EJEsIiKPeB1TxhFokP7y/12uKAWwZvOGkxxyLZq0ILMxv1vFOO7A6rYsIX/IaOdaxP5o4R39dL00+1rN

p7HsJ2L0YwG2FIcl7bcaiJ0nmE7fgCJmdRyazK5fYF2V/+yNL2ZDewQfV2edoz+6XEmy9PLhXM8p64Kt

LtDP18XVB6TneWwyyHqh3wFxWtt9ln9eRgJtpF31WU
3a07Xs/E3lRWTifZV8pk1h6Nl/aKnkb/4+kpuk6Ba8T62VZhh52RiOXD7roIsygU2PkPXd9t0zNaeSpy

9SyMDUAMwBIgSvAeVgCX0bRQaxswxEdaVoEBo1BP7XBs7bV/GM6IE3Oc/h69vBQ9I74pu9ACypYLfXgv

e3ibh3Nj+SjShDoDlfYTRe0MrqneiVqPVdngEPqYIl
cXC2pdwB0pzes+hOsed5C065OVlg5CxpGTGgC1blVKwbHjR7U36VvIep0vfkkXZYL4o2F8BvsMFL6Znh

8AtFSqX0JiFxQg9sB++cquN6yPCfBvwlv3GLw6s/deh1eiQR/hDHogbW/VNcLuozpySWMcXrBejffc/R

eWiVov2rgG1+wOJg0Ej8x/PlzfDynTEdDpk7qZuBB6
2nLjmj1WV9SKRIKcZ90QmCFbseF63Yc8z1vfoHyYmsq8PWupW4n01NenNtMe+o2amJB+J/qhNtIivQ6w

72i6Ml6X+4pVo0Ucy8v1MRy1TnT5/nmhshCKwcO4DtVLtmjytdG/q57oN71rMbjidBXd0ueGKSUmQ3UQ

NpccXXBLz/Hl/Ef/EXS6/6slWmIiQQRIFtMnwQ2lY4
x4ftexw3kUQVujaYpmTaM2Er5AL2RXCPyh+LsDSxDosWxLrmQEkN63qVxQSt+7r4ulOB/PzDDQJxgYA+

mHEi1+HwnpzVCyiYJgBcPotWRzxJcoxsHhNl1GJI4HmHigrDdCap8A8gww6WfO3UOK0alMVNWpR2Fzcm

SMBOqPVhkkzX4RYZKLB3SWB2P3bxBPD23i258+DGKH
/iSYspHbZ85+g9HuGYEof8VdVHvS0m5uzoO+NeZN1l9rly9JHaC7WpvakXNx9xxMFMwLGAR1y4X65n14

KvWx91ZoeHYctES+ZUQb0/I2JwcmKtheq/chk4qp6n+Ch8bMub27rGcPt3yTujKtKhTN2iOdFIIW60Cu

8SKZtIrwZBbj08rz6Oj5rUepxpiCC1hQI+vpMOxwHT
8gNNz06fz80bjVR/MescUCQTZ5z/k4ISPZk0dIVJV63r39CfBm0ZA6nxyBJN8C1rCq1Nin5Yd7VqUg/e

3gPg/Vt7mAfJiNcmhUV2sZDKZibXuQC5KpxMqZduetrCkSX/lEpPKUBkz0wceGef3VOdwu616XPB6MVO

/oSwH1t89j5yA70/lY4rQ9rcKR/dR6etU99lI/agb2
w9wmqb2yoZEOGvQr3ewdC4ucmKcnpfk82r2hV+yaXFOZrHfi3ahVaSO2nB2/Zx+fEEp9s3STnvZf46x6

n/JLicY+r+015L9itUtmzih2BqQaTadUfHjemHzP6A5mWwQHR/2+NT/3ONFaTGRNQEa5EzuGb+bx0p2H

Aq6aYzqFnjbBjXL19P6aqllhbuh12p/Y0d9mVTCluY
s04iIQa2/hCDGtj+CAMbjeWzosgvAR6h5uaCkEHE0tyXJGX/5umd2qyQV7pW0W/aeUFbYsiYQrW3oXSx

j/I3el6STQIPwFfgu6RWj8x9+ROogA69H+vvXLK7It6MSsFTf37hvlPcc47t0XCWJ4mi4MyXt8pGAvjh

/IGGyndclz6db7RfBpJ3H7zfL1dZK8ys44E1vcs3aN
HGCMHCLjAr8CTQHrhqJRoNXeoVqH2wnvL1NkYUBDp3zqfVEtC04AJQNAI+3AnB2kD+6jy5IzgK6YZ9cR

FRx0tqT+RiWx2ywqmZJcvDa2utHjB4wNjiLzAEb8tsgQCF6XLvBdhR+sdpru7Z7K9tywOSoO+kOhg5F1

qZs5fwcbO59PXpqZwA8FLdmlUlj4gTanwOCNv5+mml
fqqNU+1gInTZ+CO/WcLwGnJuADHBZLmPvompmzY7F/h5Z07nU5ubTuJznj34YssaFX1bCwVrwqKyt+6j

lkX92j+/VXySw0JAHn87XuePuZ3IDS445vS/YlWaVvx6Zvp31BlYWbACCh7emzY/5+T+xf+9KPZ533xT

6YZL+kuJrHX9LpTFR278Mtn+Qp1Dmm8Rf6Hxtf9H1L
/j84ifd1tWdbkWFL6paAY+ehJptd1GgxNYGRO7mMmgc8QCFSJomZrgae+13nBpZOhP3Jri+dLFeoZfBS

/utq8YgdMJ4AMstBJt1e4eMPZ3mFo4Kq3NUmIDMmEDE7Idyx0hJmHggff40q+AbElXgtmo6O2DoXwtjg

+Tl4wi9QslvzlKSDiu3Bs/5NZBWa/DxZwGp0cHcPf/
S98hgK63YUdyJXbMCyf5xreM+3P1e9Iq6kucne/zNNqFWn6Fw3AuHpm33ypbRwCHxwqQAZbOWjHBb5VV

u4TvycAJj1ozvx/ViN1ekkqplP0iqIH0hXhRRnUag6gpZScxRkbEELwRw7YbBY7vKw8Y3R2lpsrKszxv

A7CkzskPxMJs0Gm/Je6QdquSYDOQPvT8Pe7MiYuLxP
ldT98ZAY6uxHaPrNsOXh8AKkeXef4ytMOjdaRamb6Xx9ZD+4DnVK2yretqO2H+BN5hBTTAnE20sQ+yGr

Sjnu/4I4tjKZe2j9qpPe5xisxdO/rBy3MgzHZLwSDilabAyAVlzZoXUf2cqgOBtOmitmuiWnc0QYU+Sj

P3ZLPoZs48tn3Zpo7BBjh2DLR10ckxwXImW02YlRRa
Li/DBj3eopyIdgJW9rN4T4vks4I+ALo1kwB4sSFJUfWgZNSAEzZJ6xoFzKs719/Ma4XSAlKYCrTcIdpq

EvGYCbK1NWi/wSZuO8lgqd/RklvyQr4E4kwIgs+X469Y7/267oWUV6Ln9vppWo+C4pvA87dIf/QV3bI5

EwMPCD1i8e8CFlkq92SinJtFYhvGUDPJs9uFADdBDc
FYxrifduFjAoUoZhyC1WHoMAgxb13ehbV0DQnuzJgtFf4nYF6MYpKndx9co9BYYCXrVg/6GtZZ1gOJNX

YtqWA9cmFddMPOsK21YCDWf5pFxUanxY56qjYmyMxR7Ro9D1PkwnTAdvcM09SZ2DdZuIysFGjfsBUukR

PHNXoFBwG06lQbVInrYtsxN6wEZJklAbpHmrS7SPUb
5Rcd8O8jQHtCJHL/UrdxicRlyD7bvSFh2+1vJt3uAZYN5fB3OJXWF2QdqCAQI6/AC2Yy93cHAyBbymtf

P3LvUPgAEpnYcDXY8l2ujqPH2J1JHRHBt4QpeVOY8kWZg9bn/1h4VBoB5csLjXmAbVMCgUMn11dj+RxT

ymr3DZJWCyXFcA7qzg9E4Ng/+2zbtIKWzNL8oi32xk
WRDD82A2Pevkqw0+j4/8YEjeeXkfsn6kbQU0AgR5titwYTUc80TOmXF5JF7iaI9Hr381KCVmlZm2GQNV

G9zJPt5t3wSz6OxDlWB6e7KajAWaloy3NEjr/PhYgsQHC+f88wb/qx6menQX2r11nFzCQWW7y/l17Y9K

WVbvqluyCRVNucSTVVFB0Czry7d6unKaEGXhfdDa3Y
e7WGrnoXOdPpYGq4bUXFlczmwvWyhutHTyjt8kNa47uFIFGF6JfDiunXl0ghixGAoRba2ZSMXaiEIXW4

5CI6ICX5u8jJ7gz5wCX1nZjvga+xA13mJhWJq75VzklIvEOMn5G40xs7SNw24TL1MLWIF0dUZwadKMef

qyxXfRYTQA70OpBEe39GIkyHMTT8BDPs0EV/wxgWEF
dzqwiq+ObawhTS/wbyNgwPYlkrRxRaKHez6ToNCcHDiNA9XC41uIl1jdp/jdh7dmCKamS2i2IhaugHo8

eTa9o/ZJ0dCWtq9L4zF0mYReqAZKZ/T19anm40IkKIZZ8ORAr1Oqea9CwUVm8nyhcJ2PV81nL16DI+d+

lEU4y0AQZS2SEWG0fu6Zy6HCwxnzeaI9/NHhbV0yA8
EJ8vvxSoPBDelrS4A+jMO+E8QbEWWwXYfMfolUE4XLTQGK5kikIZoNDnbVEhBcA8TmLuMfhfc06pSZpF

t9wL+XGDPXC8WQ0nkxMaCr+OzIAaSIcA/5fwDiOlHmSnzd+5rVvcfIjO5Od1LxbJeoGYxAYTCQTCYOPW

rkUnAVV8O8vsICQ3EoJHea1mx3uVB507MfyKGQRsdV
KLQY5hL2cgLrEybiC77HEh12+FOYh9RQUR9zIOIgwcssgOKbXy0M8qyJyH9emeajD2Y4MbAIBQkxY159

NClDR1pjOo1JmhWOfuD3AvQmgxNVhf5u0ueJlJvOdP4x/XvCoPRKCAgOdQcEDnhsIMM0QQruVgJ9ur9b

K2WDZHteIYV0aI9xKVoBh90s1K3cGpXvIYB5FtgJti
4J6z7PmM3CacLE/Bap1FOq4z1eqtwD1WkS8jh9Urnho4jU833fN44jos09ba1YSdmn1RfYXhLn/GuARy

9mt3S/sZf66UXnRXqBt2DxppNVusLdugZmY782nHhyW047LfyXukHYslrcf+KKbWg7T//DwVxr5WIcl1

OU3HzqnT6bI5gv0D/doW94EsPMuHXDVEhcPnRSzqUg
UhxxUBeTR1be6Jfjn9hh5jBrUzFjKyeD/2pPvmqs9Qvp1iVl38J+P9I5+TinGRiYu4A03g853ydAzXFq

k9s3CIO8Pi4axG8qHUu74YRACoMJ9VK0P72v0NAz57B/[place]/FQXFb4Y9LAZndKw3NIYQqfxnZ367W2

UGPXE9YfKlwph4QUQQSaCoqlSdU9zHB10rbeRFr3QP
yKLFToLndu7wEzbFKY6NerJINiFV7tY/zcJ13y2+VjMil+J8mmYK/CZqfe4ViGhg47QHdZKdsEEED2Iy

Y67NSfSqakcl2t6Nc4aD836orFsKIH8TU9gA3FEe2toeQJMVrJiAd5Pz9+Nh0ANYmhOitltfX45n2WVc

UzWkqLsevUUBJaxE8AXzL9Jol3oTAe3o0QwSB2EBVo
OLSc8SanjaTgQwq+zkeIprm2yKuhuQPiZ9X5L9y12i9zMQFVn5EnNoXGO7z5hFYrQPdoPeB8Jh7wsvCO

/pGgOf5w7tqJwdG+xHXOulMAANYnZTUi0Qn8NXc2flJkBmrzcBe544druMbzMy3P2VbLmgB35LukD1Wt

DbQKoCb9T4LaHEDzBF3XJ0SLe2CrvzwkSWcNhsIHQv
mFep10CiSyxNikIOEuLNoQgxFcBmKLblw05FMldjt/FFSpzUbMkDpX0naKEg9u3qAbDXvudihFJxaL3X

Ksq7yC8Rbxapxrj2rV5MR3MAQiLFC3UzH3dPltN9S6Veti4p8ntcskaN4kPE2aJ3NFkh4a+Bc3tz7h4l

lxqXmaL2pi59xhOHotZlozgPy7q+s6Wzoq3A8MSDwG
S0ZD/gVCHkB1sLpt1Fr3QHgPudOHE+ufAm5XHBc+qRjoGf2li60A1f2Quv0zxLmcIdoZulnR1EG03L52

XKPGKjNSWRhmnuvLFUYdCM1jCZZYr8QYLyZagsRBgewgVE0ujBrbMKr1bukYRVVZBwuLu5edT/NUu87e

UKO9pnwURHxcon3rLZcpTUoIhGpmuOqo63Sn85SoxR
TcTFP/s4Ogsd2y27iKDm1ElFb/mUp5GRNhDtsId7jtAqEEvgWQYSDaI+Z31Ir6IUOPHF/aO2W7Ra7Q3P

j2XXfKlBaOI9RVz21a3NIhs+2WDNnHwe5y7w4s/d9CSsgdCuVahYpvWqREITt1SYIwBxUuXnQ1+b1UlZ

5bixNTsGoblSs5686Qebk1a/jL1T+PM6BJLz9W3R+h
b/2rIMfXKCmKNLeAitT5tFkUUt2H1zFd2U4pzfhXTvC3diS1EYyV+vid4NMDDzYY12PpmVR9uFivArsz

6MvjGFQkjfPscs4jlPCnZANfAh35FeI2klkux41WBAr3SMj2RGUx2FaF9kxc31RTwY/N2Q7+oO2mkds4

iOpAbOm2IVMrOpooYZHPSxykDT46jv9rXXc4dIFYhv
95ijSkEgJocg1lX2UlnffT2ViX6fvwRM+hnydgD9yZSA6aKTLqItQmjLpGAoYOatJoWvcBuhZKtYsN0v

FwN+786vIcidg2BJhOfQ24yWcyE6dtc60erUO0PAh4R1eDPHMp7dscYgCDZ2OlTfnhM9AQubOaoDucop

gvqiXneo5SD33IqYgIFiHBCTYHnNPn2BJAJht47TSe
Nk3vZTgidhHAkOHIcAC5G/UxmXRow0qrV7lKPUUFb553RQMA97YPdOF4tzegCuUJ0GmTtfye1Sy4Z+Zs

tFWiwHKpZ2hDVuOwb98DNHIJ6ec6gbHTqrLo1Rz42atbAkVzQuLMGbBiQa5F3Bhw3TNTqhmxE+tDkalf

cQ2ftvxb2x2/IgUxa9Yok6qquJK9hvIz3eJtlkXqj6
3CgC6dTLjC6Yz4NmXpcuzROkie54tQ5LGZK+vh/3YT3iyONB8lVAYtTx1hAo1PbBCQfWd67tQS9/06f9

sz73ub67+hMW8wEJlAXx0FfIx4V/ghddBM3qYWJdkPY87FGTPQvsqQzHG/mKkNU/qI4cYat28+4hDQuc

pvn5gEpLnTAKksJ9XtSVYRL1+DlSNgcw2KWdt3yh6+
cfBRn7WKTYTGF8Wfe6h5khkXL0yvqiHdOOt+pCGDsVb0B+79GN0uQTPIHnZjlVO3WkCMSJQS2Hr0n7+W

uANuzjW6XLGH+YaYvJGMphjSZ++M3q+0ah99uPrLD2GkYu6Pef+yHATfmPKp6SIsR1K3M69WwPoaic9k

i1JP7lSqrul0DoSj6nabbSmRm1E2mhI4VJgr+WcsZ3
MqjjBNxWjUJ+Wf0dv3NtGd0nd4Uu5ZfH4n7kocN94vcSb+HHhWHIXWgoNabVzxjVQJzPWUPX5knQsFE4

oOpF2zUxGnVxrr4oSLbuviRsaGGS2sfEf7JvFjcGZI+ZeWq8DfEcKuk/t64n8AhsHbq0CAIM3URrG2ed

s4ivKonF+9oQESkIh7Mb+FtJD3SDLzcmKXLH8dPOh3
1P5u9Nge33v1hrg9WRakPm+IZXlY4SvGqLqt2+VnDZpfJhs6VWIKaScruL7uM2MqfpEluEgeS9Iovm+e

uubKzJIfjOtpWUiOmzOkqNl6NipvcMELZIsE0FRC5UwkasCsasrvIDNN1bePJszlUyPNrl2XiicwmiJ5

VYEd4dxyh/33ogS4L+EeOpyZFew55Zz4oOGkxFvOji
O21icjztrR984OqRDdI4kgn3VLTvPEujcNKKa0WY44FIXqhr685A6RiqCcgtRorTKSNwrtoEc3309Qg5

vu7VUajzG60CAZUlDJGilhk53kzWY2cpgQaRFEtMh4FG3/Q/okvi8rHPQQy24kYmoHx+rX8frf0l4r3G

Amxdg+Vprogt/vfU5lwzcarQwzUb1Qn7gnTdJcKJuT
C9gRS9iqBUQ1MR0Q+f0c/4sYqS/+9aWvzkGX+FKc01sKRyxunAVKiWgJYRF/plUvQfl0kr/QlOsv7tgc

poaEoOTu0P1P16JXD3lHOju96sEmpTwopsGaCGRhzhddS1loSkIh+DH0VVDiuO84OnK+KvJ3uz5kuV7e

2mypfzYJTh2CjDFIL1ea2y/Q7iIB+ptLx4gin0Xhxu
XrXH7o+qG7IYmzQUfrXMftxI5WYal96FQ7ugVzrLABEmagM5DIqSVWEg7dWUlyHywP4oYoCVwpcTN1cS

nQ2ptwmvWE4wY16hSisnXM2Ld9zhgVy5EqAUPaeJHoqThMtvA8MQDee64pBryxlqrOQ8btLRe1CH15Jz

Kp2lIK8iDF31wb8EAcf20UTGR65L4bICPZ2ZpG2AVK
aT8tIarvbIDu62APkXamrj6SbzC+U0YTTyWIPIvFHl/d6uu0fswGTXoHRxNe2EGhuqJXA8P0R2+39J12

mgMyZ2iUfyO8SCr2rA1i/OgW3tcnvRknl9rHknkn8PFuQsRcMFeYzyfc1l/QmiSOcF3uUjQDEwZo8Oh0

HFlI9eT8rmmwtern9sA8VO3agIXPJOuVoy5hC6gjAI
3G5/yO4/jM9vDXWS3+9AXPA3k2JWwsZujyZHtaAdOgXlLO6JwVCVncUQ4CsF8Bhzf/fuTVGlvaeWnfVd

HV5Kgej+4YNScY4LLQ1onvKiTJQhlWbyJZAiGQgYgoCj6rTzEeyR47iEVC1hLIVx7qqXTaJmRe5mJvJ8

UclRoljonQYkQzoaewMBidqTbpmpgY9cFawQhACdHn
Cl3051cFk9zwSR+OBLnan9x1UFFQXmc4gKTruxAw+EiZn+ykKTUgBgE+xs3U7Z3/4Jzl1zxZACyWtKOp

Z2ksyzbx7To8d5/OK637J03rhQrrSeWKebXNQM/rVEepPBdgAVzEeCXu6LzF4bky7o2GiQjBobDmhCU6

hc+TfEEB2bgU2N0JSJ8UCM51zVfHshk+eXNAaLpMmS
k2SRUDoqsdr4cQ712fEulqnETHYzhKZdwhXMwteWGQzd5jftvkklB6CaioJGxaqfKNg/PfJ+Lzp4cRw6

oYL6EEINwJHuD3mvcY6s4Lx4x9YrgB45JocSzU91sbpEn3vK92/V1s1Imb5M45aF7oVyGyNQeMaE1UT1

rN2Xmg1b7nvrQxSGI+m6RM5qlnrmykBU4X8fIN12b6
vnGKB2dsGfJQHlUlAIr0OSjPk0NvlG56wrDD21J2l+rCJoW1aGLi/67luhLeAJVG+i85XTOX2+ynBesO

hukMl571RPs3OaBbRv2TixJQagqb5j3+e5N7NBb0w4fy7AcvyiV3D8sS6dQaH6HDResGWteJZmYlf8Ls

7U+dm76Iu8f06bZ/+nX5U4iqRsJC9Gjq6I51JTjuwD
PGTluP1E3NjO5oTq3UgN6+WH10zMsc4ciMYERI8CO6mr1XRxDVzgAvtHnuFzTJlYQW/bsPZVFkcY51bf

cuta1zdLTOFrssPIWBVp1ASk35ceci/fV14+OsF3kSbbE49tcknfieHany81QuLtLzp89z48VXeVq7JM

wxvVL27ww+HlAYY5kOtxOb9ds4xJtz+csLjPwO2+Nf
cZZaaH3zRdhVX1y6VFQ1/By6yUf4VKRfn8KhVrpsE96a0dreW3fAup2H8AASm8+LUiBV81Yi3unulpMi

yD6jNjzl599uZCC8+92VBt5k+/UfEIJbxiomiMD0BO8gO0c7ZEG49/qy7QFhsVKMgxNFT0tXsoYA7iwm

X5on8KHQ9RgCVF1xH5H9LOAVKD2ujm8+LTgYQtPpJS
n/VJGAtbi8DnKGpGRBv/AgqbvXH4+Cjfb8pxMIHuc+ZcJ/bpvQcQq6u661Gx4UhD9yaDg0wWrTd/Uywo

xyy1xhiXwTNQQl8AiNcBQh6kl/QXVWDTNost3DxsK7i1o3CjcrGm9Ye724vnprL9mY61D9tK3hc8FU6n

dg8I/TbyTSpj4hhJ7ciGDY+P65GHwEGyjS7xknShxo
BJkR+Jeijtgak7S8CIjx7+nsn40IwE5PXg4k96MYY7Ok90HPYPp0N0EZx1QiyFR1Xmie1godI7ltbU+r

TLhFCMEloxdvjAcQyU5xm4+niJ806J7ZvZRdtJjk8XLsu6IHLpLS6Tt61gRuOfTIu9xgUn8vEEnkAysB

7sao9LZScTEn7/Y8IFKaqFhinQnLV8rDZAD4nr0s9c
kqWob6h5sG8IVCq08oRbWugtyX7LwOVpBk5QOwBz27qrJ1L+ljnZ+r7XSTrawcGEUCRSUgPw7DTMJVYA

mjIhq8m/NaUyA1urcjATM51vg3TOYW+Sv7OpeNFVVHU++/Py0XLdVqj388OjRHkyLGbZWa+CMO7xhWkY

Ut79komua97nQmAtRycnq2j0nt4sE+ufqn7oO7s+N+
WZGV5CgMhkcyk6pwYFrTVJ52r29pYQAcWP8AnVcNfOszRgqP303r0muxFaLmOYPz7ufMfej10rBIq8PN

1Ix12roUykrl/928A0+rj5Jeu8D8Anskth9Aiy4mLpmz3So81HFbm29KE4mCHnzmOOQSKJJ5TB5iEhje

qqRrTeKaVqHtRITshtektko3guogebi8b7Yr20q/2o
xK6DsaxxfxWk41CwQ9FO1tiyZ3bNYAZyu/G3TJ+YhNOUpY6bPMBcEY0/qW+d48rrp1D7zIfX+rz3ja2Q

rNUdXKgIkYRXIvAQLtgjg1R79qxZHIn2SWtlRTtcCM0SVPbF486rSuqnWdUTWj95PKKwBwHDnHHl9c66

lN7Z2YOc8LE5x29wxOBOuoWeYryQV1nZFTg/Hz1brk
fcEjgMlDwx4tI+jZDVBTdeHQ0qEoxtJov8BUsFJiCr5fLuDlnkKYt1+C9gZBUv0o+NbYhlKVF0oJrj1A

pjLRwq/R8kgJthB8012vn9ySL6c5khGFOxCFwAYj+yUImadjWAEEZJ5PjDERNGtOBrZ6vyDF2BypV4sY

iMGB8cfB4+oSf8J1oBmXkOEcuhlu7MBrzgaA/t/IjV
bXqC+71K/bD0aLk3TtkEZNH/etuv5x5TWQUTIU0yB31dcIKfv7vP2v4svkwmHcaLGSR2HM/fGQ6YGJeM

OqACHrWUCAPKRK+LhCSr6qtGjWAgQ1FDAD2pK/FTEZ29kYzGVeMB89ERsngPhcp6kiPtZlAfXIoHJ8Gc

sN988My+UAX2g+FQEk3b7Xs0FO+peNnyuJ7U7YyCE+
jiqvy8W77vczBPQYRN2+cq8w7fmnGCGyxhnKvzU5FvjxRk5srbW1pjJJ51ZU6iuLv7PfGl+nT5p/CnDw

I43OopJtfntkk+4v1Ng51VIGS2Msi3PunUyfWRwmVLqtA2TAPfzZoVJgIrlgQcA0LLtzQv67vN1xC19r

CSamBhOM5sUaoDKpy0bjE7HmBPndHRmQsXBVv269xc
eTQbPFfyWBLl3TmUAdtQJ+2FQh5H6z5AbZEzEWMsD/8QuqqjFRP4gQ6D1C+iard0j2mjpo7+EclfrcrE

swbh1HTzhUJbalrKzqXg89DpO2OuuC4RFOlAMkiYT+kDf6UlaNG3ifecq/q1p7stA4dzMXUdesRQq6tQ

stDxGcYLHvMWsKT2IxXciH1YJQ/9JFuLurR8/6m9tw
iUY7rgdGmsXRoG2ftltQwoonNCQ5HzjMTDXMXTdh8fUWAxwSIlcERZCT9doVwf5KYHOSctSMDlk9X40n

dep57a89+/i9/L9xts1i6t2190kK3NMpnF7B4n2snUhBR6NUB+79B6BfzHh+aKiFbMeEi/hBBHWePuW/

X6zWdHlJdE5Yz5ORVvuuFgsDA3kOaCLRwWY72H+rLG
331fiDamlneT6gatRqO+ML2Dgg0udj+K1d8Dnw/r77L1aoTgyN/TPh00qYquzTI5F6AYjd9m+a0Gzbpj

D/L8U2Bu9vcWZ1tu7gTo+8EgPjQdwACxW7+bf1o1SBgNBmi82N5QH/bRWrDgGc3sf5L6dWIE+BA5lmxi

Dl1UFmCPn6Pj7DDgIzNuin00gwGcX98zHPFr66pUvn
mnKG1Xj1ls2tQUradU7Zh+jzo9d1cOSJZYDCgzN6+Rqb8wZRbV6Q1DrZBGlLhAoZRKq5n2J/pArCZzO3

Jp1Oly33XYeH3Ad06p/ioxSyY+BtYXhz4/MfwiC3Ehwjeq/m6L/2Ro571LkcB8/9BduqCIiFbEW2VOIW

M6T8/jt/FJWbm8LyLO5XMHUywi5WrYTuJRpmbm/1GI
E0oviVlAQZdXLAdJjbziocmT1yRJv4fNJXArDMAzE9oA7Un+FASZq19LK14+2dKzx/SBqoql5DWLnK1/

4AY+pjHTyd4kaVx9Rudqn1F6PnhqXtZuY5TcK0odTchZEqnIZnjYrGwe+9aWrVSpHmq1Z/nFqfxYu0Uy

cGKLBzKkvqgIW8obNseD96fMjomITI8noOn94qF/Wl
x4ySOf6AYlUSFLsaLFHrezfDW100/7Q071+1jpy1zU5i1CbDY9lNTJ3CnJ1mDGP1rk3t+JqEJ9ZmoXAM

xbn2AEOSyWaECW61pbG/iOHlWfBVskac3/OZjIfbd3424H7TlUcKkvNFkrg3J+Pg35aqqI+eKI7kmYbN

RkMdWV92ZkI4g88VCJxD4u9In7vAdrOofzbF9afqJD
C7sdA371MVjXzddYukCHOrinKtU0FATXwcy65m5AK85B29j/CIz86vvbK9pPwy/cJltp44d6T6Z210q1

2vt9DOBybLfEWERkqoa/6RQIKJDIY6lSjROvQhdJYbR9CRIqsP6ejbZHLqHSj8Pk6JDy9aGAxNpck8lL

EUlO9cCCuG8IcvO66cOkX3z4cSjaLJqJT2t2ybjU1z
usoozbVhzPC/h/EL1FNt8O2IxlF3PmorMze8yCLEJRBKKhqzuhFgte3MleQE/u9C1W18TEKzA9dgyEZZ

mmWYgM73PoMewQGDBIyXJAREXKQvtmAVlx1AaIUHLK3vG1moemQ5phFfoBAMTBfNV31c767p+XLVqNBV

5MO7jDxK9+P9X/acD1U4YiAlesJJqKJHmi1WUiAua0
kt8Ag2Psac9tukiZVicFk9OH8UxO1GmV839jeZmHaJPOKL8YddT3FMguHQjH7DiUnYdzyHMYJspLDmxd

QhDmzb6zmN4L27smN6coeEaYqacD2CdxNooHfj8ITk45oK0GKqg0R0ye5xjWEp8wu+SRgHd+3JEeINCB

8j7s3jO8U1ficm0zXtpOesNZkJ09t8Hx8/LHiq9qaN
UAJoKdFMySU9VCx4G26iGnQCeCK1EuLx2THcBUjBj7VOzQkWeDiVwRxVV/t/OxEW0hWhx5hzPB/9LN5D

/12Clzz+un0N9JhGRBNptp5Sxf97C/5zf4xAIYx450bIWIjlpCFNAjMpg8ViYc+iyNH8witaX9mP7sq9

ZhY8D2qk9/GFCGSJPkLolOSj58WWXCzfLPvjwWnNzg
0NBffQypkocWSAWckHG0jZUcG79TuEYvlgW5lUCtCnBgmhtwtg836ZDowLCbHhUDR8wOzJ05TV4CUR3r

C13+PZdfSOgrLa9ycDfEoOQ4vQj6Mp9P6Bc7FVvEJKAW9UoKoMdgnJR8vXWPO9Xz4ZMksW5SMAJawC0A

tJCK4vC0qUk5hHKHgsC3xzBOooQCnqkfW2CWYMJgWG
MOzzfKmR6cQoxsYPZ9y1KxNQYWrX/B1iJ7d+tEOEeTn8UG3XBmB/2+EARipaVmcvAvg7ZI8xcrD1H2hU

XVmgqrbtSMtSuFzIdQHUiaOEpJDPA/9bsCnZs9iMbwmFQdgc8gG00MuII00yd0GIv39/tPBT+aCj2o+/

1ogqMBroL++2au8/FXYVx4T4COokqmp4y2nf5o50pt
7bFGCtjyr3Mwm/bGU836nsTeIcY+ut/4AZHruT7D687/So1BHb+C0XkEUCzm1HkWvso/s/gC89FtzI9L

RQgeYPcM2MUsPjRgPuU+xniibwNQEU2e2IiB5ShxKS87i3y5ERMq/UCTz1MzA0byl5eTHJGM6cnsr5pq

yhebL94Z34HsNVp0pgfyjpHzONbGc9tnRXmR2jdPJc
pNJSy4jrXgwa7gw+0xP3vpkzQT07srgiABeixn4s56orCokNiEUhg6167rh1ZjyHBNaB1iNL3QV8RDT/

S94KQWSjF4MxCDmEuAWAZxBmQSPdr2mklcAlZB9zihaQ3bqOKiXZxCqlffH24NxgO2J29Akw6Lca9+UY

8rtw2YWt94E+ECkawl1N2adwuljOWYqxQ6kIWLcOg2
eJGiwCh4DjJb2Sxm7mxgwH1woWqt4Rz6NGKwUGBhO5p+HZ0yk5NXHJ/EgX+939qLFlOexzcf9N++mrtR

U0zYHjcwpz3i814kyJRmfxcaF2Mwpb1Dw/6bzQeS/1CQ2KJS0AwgcyCgTGA7flM9a/8BsAqtG+W10YW/

nyO+vvddH76PsAASRg9ADWqpbWEeBKvgvRRbBx2TlS
+ouBYWfP9fdQcdNbyjV11KN8psYhxj4Hngal3PDnE1Uck00JPi0OLnvPCY2/jrHMyu3P2CnPIBAPJluJ

USSI+srWOfhUE6TxSxh9FQkyVb+Xams7jRQu6zwxrH9M5SRvzLI3v4/c2L9pBftL7kQcsGH1AJGQbEB4

jjqu5HFng6ZOZn3T4iHFtocjCsE5JTL9psGKPbeth1
9Ven6ffT+cdhp8z1mEo8uTDqtbAWa5R5IjP5TZ2VFXxCFh7tlfl9+Ms3ZvlEdUMvc7Bc+Kwesi/86ZQou

ovKkV1hfzVYI8mVk8HY5gixXhideeexURJ9Mqtvxrm/oXgx0wAY7yCEptA9k42smhx1cnT7DSnhyDfwl

6op13SS73lsD1iwb1mlIBF1DSbLdyoTvzUwhW0nxz+
sUxcvSOB72wefqBZA0xH/FPKtVMBFLeR93edcklkuw91i3qi3QCZ9XrXjZ7SOaoqZVI7HVYC6kdBbhJt

E+gSJ5ZZn+bRqm/lG4DKeQxYLy86/k8rEjJ2vds95IoNkBMqRM1vP3NTnyg2Tpwarx5hgpthnodjEkLO

AgpMUmNz1dPyG4Ns48AdJEHtMh7LSlhGm01Suu/Sn/
yEdpvM5QZycUA81SmIIDrzP/61bcKJ5z3tfS8ALICl0ITnt9IMprzyqnMWEY2EmjnG2VgsPzgA/e8ga9

9JarDHyFwY/p/ryR3pReZAN5+cFMnh8dQMakoSz2sDyjge8pA0E+7k3oqsxASDjfLS4CL75jko16JbZM

MvbNh7zP7oL4uALpE0fWhxCyTh4XpmaxG/Cmlggvjm
7rc8ToMFvdcwfrOnA2GmDc9b0iMbRtxh+inaLe/Out1Ak790fQbme7MSuTd3rdWQ5FTfliXA46SIHX59

JekLti88ESZ2JsZ/VFxgfoxAxvk1aL8EcoTUCwpo27mRr3x4poO83uY9wOagVCMYaoPuD+15CQ8zBVYA

igmXmWS1LOB5vqw9qpnuLAXjM/MHgiOnOsywhp6QTr
rH7+qnnHDDfvfSnS4Va1utZj5UwTiKTiNrUXcf9quelSut8pili6c+wJ+KtS/ptZkRiImpMMGH6Pv9oO

pERw3SC+/d8AQNPJcCIa0CSirO98aHHcmW6iXAhU0fs4biGdoJGLvI96bfon8xhH/OtE61FtMpQN5jk6

ZNfeUe/sQBD9mOMO56FTemiHGGMJyn816jWeFNM/ZA
4OcC2YEiptn9htqcY0XXDYHFIxaBt8sLFvR7+Uh9CiCahPnKyn8INnQWyaGamL73Ubx1U5TePOMVqjJh

XbONbC2PmdogaHjG306nfT0PDT/IIhawuo8dSJuecC22X0MHZ2k8EHnfmjq+fOGUsQsuX3mvb8fv32i5

FjHNtQ/fncRvNHb0qW4Uro7EFtr6y3cjWs/UmvS34l
V7c9VHo87AZAGgBlBu6rnLH4tIJy0G2beqAZprgUyaoWp5cR+F0K3XTiFn5sLZY0cH1fiMsS3uZwtXsE

xdDWcXC+e1NIbWakkA8EjzFb5xc/LYR9AidqhfenF+/4s3CRXN0KX5YozWAJ51pcCjFE2W/Cvwu1sMZu

2XLKZ6wuBo5jT2CL4VlanFoYDtAWcCFbqN6hx0TIPO
tLwyVB2IMCuRtGyiuJlyQp1lRKPjVcvlRhO60e7Ospb6g01IUfOmmhBh0HaJ5akX23G8t+vP8t8VMO/q

MGjwe34EOZyYem2Bt12DTSTRNh5rKZFGoATztsSmWjuZvXe2n8Qbz3wVIKVQlXw1wBLccpNBrUMF+4qW

mhSHEw1e6YfJ5qu6RbGkBLfIVvWIS932GV+oZb2tRu
nOCogAXhzP4K54RwLh/sxBC7gDvE4h+gpUD4yx3Apa8+vxxva4PSFdUEEeuhi/ngp3ewo3nkFXeIUrQD

DdA3Dt8QRxb91e4fOxDrm1ikSW5n4QUrX8UOUDKwVspPIzwMA9vEfs6hIh+4+0eRbN9n4u8o+QTgA9yH

xG+/ZCzBRl5OknZrWx051amecPxXdah1e81UkO+S9a
p8XIcuBQ+jD2sppbtNC2pAHgbRMwylQtVvy7dioIa6U+OP21SbVyduk1HFk0GR55IXGhlmpaQYgrAY65

88I/D3XbTBDde7wjFmIy1jOV2yUQiyAEmJxgyaSclJBXa2F9DSuUYhkCN/uA4YAWvRhlJnm6/A11ndnr

79cQyGA55e4u/vXF7lXz9Y9BQ7i+ckbJT8eiue38cA
taDZFcr81d2H8ro3BsWMjdTwYIdY6XBE+L8z4WX2wKomWcbwIVJxWs7zNl1r9OmZEmKJl6b+K0ThFL/I

OtrXQiGDz5m6ztDKf5m+ieCSBKnCyKzI3h6ZOzITQXPAiWRuuXi0YMxUw2jEdRo0pjO154Q/EOxdYe6o

SrMdWDIiZziqP9OmUpxhFoxy/Qze0yUi8NaOaTBGxH
oMfzdymaYP5bshRyVtijvsnWou7fViBheejjf2pG/IgC/DdmGsQGMuHZxjyb+euPI2GnPjdaQRQL7AHH

E+NZ62693rudGq52VvzybtTmR+znEbgrpK17Sl6lP9+eVKKEM6beALlUIfuOB7MRRDH4zgF/H+p4EEwW

r0lp1sBNp+ApCWvBQbAL5TxC8xu8TAXu0OFxfa7mtY
IAyaDHBkQS03J6+JVSDtyJ24SQstHIewOfpHtej8Bvqi+7BAtfJ441Q3HtzlIegVaqnqSLepZso3X06W

T7FP4+8/aAwCMZemFnLuOHICwOv+8tdD3S6qTtdsgZTHZHapKQ2+zfa/hEFHWc1E3ABuWnnY6cq5issF

3jZv8knahBhix791ztSMvp/Q4SKWdt2qBDregyZQvS
2dAmhE/QQert1N+aqpy/HTgvlLtcQeETqBUtxP/tB6wHeFvCRO+Vr1e7VbzjoKYTfkKlAmyRkNhgBSSo

myZRXAyWyxQC3xT8pVj6mfA+raNJRXBrnVVXPTgpMJJQy0WhEBlyUBexzPeB/f0sSFmZy0rTqpuSwFsF

bsLGSrfxPM0MBhcCWB2n8VF9hJVFq4ZC2ciF6nxzrp
8rkL/tPezRNqanCC2cRZcSDCgKkbjPVIWs0DP53zD0M8xxEx2vz9LVeLKGL4OoPn+ezjuwfPpb/eKOQF

mlpTQX+u5QdkcgkxpcV0yv2n6w6Z/dsRmzc2OkYd3NZPSf7LMEu/WnFzzj0kAboi6j5L9kJ+n010X4LV

92Gg2N1AxN8UsjL0nqgBxAGjP/Q2SHxuqLdOSx1692
mevb1j/6DpBFwPopm+vuRi+LnBuLD7T9hglyU0s9R6f35IEErh8nNR3cjTgkn9xiPfRc5ZXE4akzisng

1RmUolXL3usZKB4kLPneuduhCGSjj+E78oxpYSwP4OGb+P6u/FZtHISRdB59bWsgDNGBCk20fh21O+vk

kevBpvSzPwTCbTPuYpELuofVdjPfiftBWkEzGlTZl0
v4ajHnZMzzPC37uK60z2OkgE+U3yxU8J+iojV4q66PSF7NIVV+Nc5NCmGqn5i33lzmpqg+/4d2S0wO77

FcAr7nSuKzwkWv71FgYiuRoMg0WoxEmeUTAYEIewUNncTHm65VohC+q7EBfv2LB3uNTv15iO0rckzCRY

11BRUUzjGCLtUAJ6H6wBjGjXxIfpyBsYO7Mz2qQ/Sj
cZQHc95p2DkfIb/wZ1KIId6/nJUU3wwB9RNf/Wpp8ByoKvN9w6ZsxoVRtG4hB3zrzwzU4zOKWlgKHIYq

t4UB1/+w/7eAftz310c3XficRJkPDRfBGC/iDzRGKzLJb3ZK8/8kRbRMhLBo9ZF7Hm13kg/6ll9jw4Ew

n0lH/CdxjN0223RyIuvlzmxay8BXROVsOnjWJLKXqB
RkWDukoaQ36JdUhwDjCzXFQzVXKDKlLClEw/JnkEr2APlIbcef3iWwUSIAuULfEF9l0Y2RscuMPTHjTy

ezlu+YzHLY9/jzjF4Nje1XR+BYmIpdnZ0w/W0g3oJBVllLczAbE1wYDpabkRy/Y2Mt7D/8qa1Hnfpo7u

cGxbcpSdXfV+jsp+68K03uzsbZDizzvlWvFE0draTo
Wo+VaeqzX6kZh3qYLB1Y7oYjjtUKkG3Ag1z3FrSPFV2Csyf9NVKFcNsbASMoi3alSWVSJ7hmhYZ7ckB1

BpMo2OSyT0J8XX/98OARvn/N9cXXISQqB45uYY6jk5+auuFVcSkaJz/IjfoTUQ0LtqPAvDfe6NXDnm1J

Sea1NRmfAkx7HPQwgYkkVNf6rqk5j72NDYBeJeEwn8
5+qRI38t/Pd1AOlPfbW85dBRjl1SoBVxYarRk4ThBnNHb+2ABUI5l+INII0tQp3huqvGVheWn+oi1gfi

XXUua/SZ4CElt8VmiPWliERlU0U3C2zNwu4tPRE9+439b1IvwmmpOm/qisI5MU4flBA6nh6KVIusrYaP

s37otyALXuk9oDl5XfgzIh6JUp6BdMEvjBSSLhA4NQ
gLmgOugfmYVpFJ5vz82w5u171+Ea02gDtzhrdoOaamcBrDhbNKHQDnoN8ybuYx+59qQjTy6CdHP8gl54

CTflcDcnBcTR6DI0/JYT80iGSJsnN7S3gNYh6v8Q0adsX5uEvyaxUA99M2IUmVCNsEHcOefrtIhrxQFa

nYddGPBGt94BsowV1yJ+v+LZC/58goKOE7bfDu6Ku3
dLPAJMVEZSHTfgNpBlOSx0I+Bo6Do83ddm5VbEYH7DipMPaoBtphQTmeK6AXZwpn05t7p57hISXcec+N

RgT+ct2w+8BIry3wkVnJRjRDVE9w7slzdrujNbywq8eYddmh3KsPRjjLOJp43EQdyer2zEi+7xwrEYRg

gWBFxWscVWr7jnZBLs4f/HQHKSRMV2t0+N3GBEPriI
mU49/pF7qllICEa3POrap0nJ/Xf0yw7MXsiSAq/DanL2EpMCJ2k76sbrBz/PrlLeYXNXhenyVj0Zw8Qu

8FTIRqTTYpU8XT0rPxtvmW5CfMu93fkpjV/y7Yu/ReZ03nxZah/r7nO6XTC+a6tekYHRR5cRlTMknHPM

VNCQSpJ4tr0I1uf/vzRFRZ7fXxTPfUJqRx7GgyRUfA
w05ZDYvAFVReuRgRC7VSGnkBPvW6pOqWCqHde6nTQ2wb8PcI1opShXaGiwFPPoLNbAXYWdSSAAvN3awK

6oxhezKs3bhdyyrvGQp7R97YyI48/Txwodt9/AbkhUN17zX60H/5tMT4hY07bcp5qOAXhadCcYWjUpaM

pgzlMAK4IFgWmtZiqfOzZMnan4xJS8y/XORKtGNNMs
CrLT0IFQElNcqOZBCyNqXWoXCLB20Gea+av0d5Lh83EL5bDgyTAPMh2hRVssn8tjHhu+H0zZiiBr3pxA

rR1G1aqKZuEnoc3rpj5g04dNZY74PmrZNbdWv20k9U/i6jtsyfuE92UnVB7Nzl70WvW27rAXh4yzafB7

4GaxTPgpwypdKyPSHwW6PxOxp/WrBME18Z+8fC0WG7
0yebryCRzvGhrhHB5are1/9rjqj/y3/5L//lv/wfx/pPvD2soZjDjl8D5lMaiKikzxIWM4Kod2G9UeDg

+fdXG6nIP16ZqzP0qIr5ngroY5AbxdN47kpYcR/bNhadXzxa/H1Zig7FXDsc22M18WTY2h7/9C1bClQj

XBm2YtCvuJNtYO93tsrn+ioS0zbFxuVkIaP11pYqvq
dagK9d2ulqEoQ67rkog+F242c9zf8VDJegrAGhBd1n+17n9Hk4Iz0ntu0LDz1gdtq36il7yOgUdyHDyk

wh0zWu1L7UX9cHQga71iHeplWOFze1vYMcJ9W7pygxiHTYYOd/Hwf87LAK5Lb4cHo4YF893d9TQ2coaP

CvlhpH8R89f3sfne8gqKEv4Ct7+hrxzR3eN33bdX1d
TWU6IpLjgvnlPAFz9vK4KwIhIDA6scRbUPQH6kGtBEbnXdIXBue69Ffv+iKBYLm1mb9qVP9uZD9gg+sc

sarah+P8V67zqFmGZoLwSYs09/U2ju5zJbw4rzOdThCzudpF+yY45JMA98cvbGwbAfkoba5QNDpQ8JCS4h

RLT76yuhdiOfAyBa0OhXopvk7028z2C3DYrgxfecEX
YMI1bQw3wONxsIsINMBcfUxmYYYN0w5Ct03aggp51LnkeTboj+N3UYMDbdQybgJIlnCVIAKlKZLFvFI1

L+MTCWurrZ9z1Xi/6jsyZCSHwK/gvvCkaEFyVyAhcjDoDa3jMwuAIkVSh7vgpvOxB6C2jeDCDmNulEen

TmOALj8BwMbcQZo0ollVSydJqzNS3xutS+LIot+CbW
OhoTLZjZEbeuWCXC+P6xbcZUduniIlUsN8HzCWs/bPGZBVpqMgKqFJcDypodAtGh8pl+t3MunW7edZ2o

Ma3yFhOy6uzrAGTZa0d59dkQKfstiAZUddcCvOAjACvYmEgmW4ydoqQGaqpjE4X8matmmSKmo7AQFeTM

q243vOf91+0pR2l0PbFV6jqC9q2kh6tYHtrTVLY+Uc
75hF1I3xAoNOWmaLQeDtzZ0hJQKnubbpcD7Y5k8dFn2WNmXJfC+fgNpbVnNwOZYnupG7OlORAt+KN+ts

qTTItWkmiTdMPPS9P9YuMx+GO40midF1ZUNvsMdXC01oSYxwG6QwGGNf6u5PceOmK+xqRq/F5Zcrh//V

xY9C7ruGo9iBTzeYMc9CetTTzzn6hRUCWlZi9/Kiik
2dke1N4yrxTCT/vVgSIHRjBEso8ZjyUYRnux0jlsYqc5CjeCvnFrU+lleQOYSZxUOzXsbTgqfAbVM74T

hK37q/AJqP5l7xp7hTpI6IfQAoBiDDHsnluwqNQV+y+2s0HRo/amsH7XDNNMfF5GL9PYCKsGRhPSNp66

hOuPgCw700VttFkK/eLcBDNdKVaTBk6C+WBhEGDLpC
Us2SCyb905OXVr8A9kVn3scjP3DZM5LVtxwyMp6z41jYFC+QUYxzdMysDH18gQVCI1D7bZC1W8g62Rdi

nxd/h5yMZXyven6gCQY/fYyknmrVtLWYYo09ZfNKqwWMPliws0phfd87Y15bqgF+cnBHx2//OyJ/sWkn

f8k+gjXZWm97ivlJXAkxcy7r7x2hB6WzuLLaZ3VTte
ay9K6POOVFcrqY9VO834Jm1mD/6sD735M+IvIkboqwPZO8DWpG0+MG5tbubg//QkjE9g8iVZdulMGA3w

t/CGvJ9+64pan6BTzLQL9ti7v+/1PLNZDAnX/kjINWhKghmz+9YIgYUwFVeuRgwyu4TuPs2v1TOhGp3m

u1v70NjXWxZ8wfwhwcGdP7cDW3G24bVEQAwz+JZwWz
9iLbNUAWv7z/++O4qXvOL+ae2Tm4GZ/MOByeamrr6ZWNJGJT7wPqy3BrmAMv0hwVnOCQRa3lfWZnSqqY

twX7aeFl4Ng/hQvSo7/Rsz5/YLo79c9RNNSR/b6W7J/Qk434Xe9sAoCIw5uMr80LhhYY14kwuVOFRvE6

qwh5dMRIS7HhZUXft85SSWY1Q4Fq02kYMtkP4G7K79
XoDSohZgXNOoxXIYviJyIrBpf7T3iCvKyJzPmMaaC1WgiwvK+bIZOxoej1xeYnDwz2WOxhcu5hwDzR5k

xAIiB2teQ/lacJs5vRmS+apLkCt7k8iDxSyzKl2MEVnagDM0SgjXus1/kcKq3GUZjP3u2VlXkztN1Mqm

ONP6Iy66Um4db8edHYEU2mqd4aFOlvLpd9sRrF7Rv2
JMnjdFn4DraMz7SKSgbUwc5qUlD5KIrt2b8QIiQUTOzHYfCJEaO9nL9OTXGufA8vC/dP8bPQjaBYhgUu

AdosTlapdxk4/O5UDYOhBbyTodOiOQQQcNnyQYT5sWarRdkyHdle3NeuhdYL/+cRmlH6RnU69N7fjaTO

0pPzTFWVcMXTkzrB058YUjOtbxA82Z+Afsdnvgazs8
m/UcN6yD413fF1Endiy5A/I9n9qa8Vss1Ac6cADm3+VX8tpkCFHaGTVRakJgosqF+kWd3iX6B9/n2Tw5

P+j5dTlFCzT5DzkwzS4+LLgarPFsmRwQ8pCkelyv8xp/I0+pXwL1JZ0UuwH2rhrdUeTUvYC3wEzW0t0X

50aQEHfzvSATHa4o4pEu64/38vOvBXl/DSZZdpIKky
8S8VJV4v37/1GslEmRNuuJEs6ObE9cYnaCBWbuuPmSBUO8bvEVaSH5RxGpgdySHUlx/7EZc24+UeXPs0

Lu4DELB1NSlmKAwwtirO23DdgJ8KmlgIvEtvmt1Z5fnb8nM0Olo/c/fkYI21Bx1J9dLj/XxpAYVDs+F9

s+5vHxuLHq+FlirgbFT2nlrV9L+UcOvgmrMkRjiX6d
aDrFI9XxXGLQtuDwXoywP4e67t59g1pKB8Um3yWnptVA3sc1jDH9Lkwfu5qFp/kfEMrvRYdMQgyTi5NN

ktm1aDuTufO0uccytYzlMlqh8HyZqiMobRw8mWf4ld1jHi59DJj6yF9VafMdzRDJp95WIp0us5+OErY4

KTv5RMS302mLCmTuHrrH4M4Uwz4ltQCg425SjhtEcX
3sianHKKuq5OrhjBKpv9JRGPXK7/m/gF9mh35hwGUUzHpQH8gtz6DI8NETKg6vyKTeDFaB92+dglvIBT

WKb3dBXx9BNOXwUlJdn8ZOUlSDIuKRPpw8bMtJo4+YYQ83X3OWYwCSJtJCnfCsV6UIOqrhA5YwF5H40J

M36RhU9ZHH+nMBYUVjSsgknIth7jPyxq1rCLdBGDc5
SyMcSx3SIPKe/8SnvT7zxsxAYo75ovMsM983bkV/qrWEw6mY73UDT4B+Mw/MEDgcKquTq3gseu5FW7Lk

BgXhtRD+Il4ocV7CF3F81RX8u/sum0CTgGNhSOQQLl+cCMvVzy0IjEep6uOXzKrkAYWrGdPbeOAVpxe2

5O3dNaJSi9Bv2xlO0O/4di0+tWyJ42XdQcvhigTM5S
2bNMmRogtcmmkdzXVZCpS9ISbePgxhcoaonFB7FNe5tOuh8VU2eM++YQedki9B6IB7dPWLz6LrXgZwQ2

Wd3w1ViBTQ1YprQnlQhOcCFCbjr61KgV5BGbE/19dq6X5RsKEcA7UgTrbX0SVPIHnfDy8FWCqsVfKCo0

GPUxreLhwdK7xeWFI9SaqJsE69MRQW/+3QPyTB/YYd
oLvr10j83+5aG/leMpMSaSrFZM/juan/4OuUv6+IwB6eSL4+NvZKwL+oOwo7AVPd1trJpAsysnGfJnJ8M

nwkJ/Na7j6PmmhaGqObrIRuSww+V9AwEk1VX9YdC5AX82D85KfosOtj/8NYybPul7URPqFn8k0vfvorX

bguQXV9XrwYN4nLRO7Ml7YS+RAh6WbTTxXqiZiiyjd
eQvAnNiw3PLKtKyRHO/Fkhk+FwEiuiM5Roa0wrSZMEppPWF2KhrXNLqQ3fYEXKJbquI6pujH6F9R7dOM

gIA68gVQh0aFU3xxuhiiVBMQcK6dnc8oqIrBetJjMWBh91jvBn3+M4psZvJtenaacj1RG/Kv9Gb0SxHN

8poB1g4qy5Tl1UROgJqBrDT27AWFZZltpIci0GMXof
oPj65pTBcH8lyOn5Z7i0Og/3pDMaoVYZs68PnC3fp6PZyUUhtT+PpSdkmTMeCAH9n9WPd4/lHq2F+Jx8

EaHE3z63uHADTlGzNbIA/MIayF1zjvjgsyw2s8+w4wJlq67DT4/HgqI0EmTP5Gv6a3Yf1jsxJUbn+wxY

STRrBWOynVFnbR5th1pygBy7AjxfEAQTLFJryYweSz
4458Ftry8fvBbMI0766P68bZeg6bW+rXZJBnK3jnecjeBxpaRp0Ev70LwCDP2DUoJqIWwIzQQ7L/OwkA

8itnX8FBwN4Dv5esdIOPOTmwWrVjUx2jE4vu0wBtT09DbY0VFwevXDiMDFlfaSTXvKAIM6k8+yqavXUr

Fg2jqcQptH/UOP2596+CMpCGT4UBNrzbnxvPgFkj8f
Gxs+KI01PIjm/+q+8rv5AlAhx8eMcWtWUxmVmVEThMrMAKHuWfBN5/uJn2v2NKWWZpcVZmEUHOfqzbnE

o7DviwG8wMOq/1p33T5PJNa+LmVsIeoESVQOpCnCeNzmhivZiPopvM76n+MTQQMe1sjKsnhGjSEVB/Cw

Ik1XQRpWB2zZxoFug4fN8oSomXea1tZaTHRcGJ05fR
XHsh5DzztfKT1zMOhNe4L191u7Wm5g4zy3F1JVhNduSQT+x4xsyfwvDyrXntxuX65YSyzzS4jVlb1iSb

sNaHo0GKAIbrIlgfpM1T+nJ6eOsK07xZYS3NkVPy30q9nkD7JscPDd1XpY4CDvlIduryUHIhhxonUgLt

peSfoUZ3SRmy0JSNVRLntLMwRXDN3DOv/hG4XFxbV/
5ZukcSli092VJlf88F9FadAnFWI0ZBjZmVWIsQghK+ZnFUn4DSCVxawGJ/A6/5p7uJFZ+3AHj1axLHCQ

OeXBwi5YQivl4v22y97e0OCPfzfkvSiu+cIhdBRfAffjhjjp8XaGWHc8SBoX2M0CEXjVVVuU/cG4E1Dy

4I3z1yWPeRN+Tak8OXa/t/7jpJAMvSZKvuP2pAem0z
9HjdXpjucxsD7iMl73REiOUVekg9VEHPPQrkrMfxW80uPExnC+AIAZ2gxuUko87TDoDWv5n+qFxPsPXJ

w9B2f0IpdVfzfsXhd8dzOqIqTGPoVE5/Liz0PtMvy2OiylmoAxqsosv525yPonVTFxquUdNWp7QA5gWi

gP1W8kQffnLQ+acd9y75R56h2po1h0acDixFhvE2Ht
r0mVI1z6RpoFX8dwNk1ISdMPW3BC485bFPK2HxK61puyD7ynWZm3WjE7Km+ccacm/gKx8IRq52v9U/df

wbSRTVipSaf63EiKYi+fYZEp1o9y7kJfuRGUJ908c9vdAS0iQejgcHw/6bB+brQCj4qC8Qq0ElzJUqXo

cc/i7VjOnrPyFvauQQw96Clg3Ycxdzdb+AN+WUYki3
caTItS10RKzXo1FYrv3R3ohy8bXui1FtNyCXWZK6blm6Ab+z47Ofoe1OBNyGhMcOj9x3Ouf2wtxa+DvG

vrB/mQzdAgIXekQlHX4MLjGQXqbMQc4fxOk08gz8rWYy71G+t+eCmqpJ4ibfGjwOvWBvqsCDmLSSsAV/

fNTFeOz1SmtU5rf1NXHoiPl9ljuCT72oyeHPNSycWb
2r6ykhPGzk9DJearxL0q3ZM+tQ/r9g95aLIFrGcTsG642JyBI8lAmc2Hc/9+yZciL6AMB//b6JGoFQBh

UloQWYSPzmvm4pLwAfBj7vwqMpSvm453Wg0DNX7KqvTTtwIv8Rdm8JzgVU3HECOem6Koa4Aes9b40A5W

EHVnyWyyOe+m3+hXk0UsY45ncL4Nmd3Augh1MlXK0d
FPqC+sivheig3eU0rMZHvYA/Edy4bNyFfmtvIfumc4qI+v+inWXN+0wMgtVYsL2mh5rO2KQh+zVExenD

sR1NRgBWKTsuvVFILqjaDkZ0fjrPESUhoLN0p+pBkfel8Yi6X6MKaT/VtfAKV9oaOR6PcXg7jw8uNHtM

mFlOqk77WCHfW5YR4s/obN6pZkvYDEpBhTitVbjatq
KJdE/eDiBRHO9/NVlMV/aec1drC+ffA/mZxiSRnJilq9tVvHUbyk8/ipdp+X0H19vS51ZCVGdvDb1uWR

/HJ8UvV0DbXWl7afDTAOURdfp2ZErqQrgP8iyK30AjYX38bR0ziKwxaxfaLME3JUyqJUHxkbnmdvDAuP

Jm1l9FMPWCe8aZhX0RIQ5HLA2pU9cZNeD9A+pFbyCJ
j1nntmf2T+d5lNazvnR2gIj9c0lO40Fgp8zr1ZARA74/94Wdv+gO4lCYPi6YgXowcy+ksB83jOBNE2jW

Yxc0ZfQ6ZMuZOtB34wzVaPh/4zUE9yjQsOWoffdTcCA3SibFuaP3BkyJqfiTvHRAhX1zhRqQxG5Ww8/C

FiMCOLQP3mQEFV6wX5RPaM8qchL7j36Fzt8T0qHuA5
25GvEgtMiLbeoJ8aDnCqUNVV7HZiS2sZsvZMWudOZtTk4/7HKeMdYJhhGdRzxNjxB8Ws4FoK83h+QczL

nczAYKB9pGBI+gBIog1frYBJnk/1de20p9M4frSAUqRxl+DYyGDKFp3qBeLmiXrbcVg38m8rE62RKMiu

1zKG+Rz9wtgbFVXd5hJgP/2SzS6Wb+ryeW2UbEwpvs
Ga9fftz+AV1ExFtl1DP10xYLV9hjPoHlPp1JKrbW95kmHuSyw/hgvPCh34LADNjjOgcgSLyVv+sHzUk5

TL3k+rhuRZMPS4hnqWZTsSwz708b+llXwtHn2wDpyaffF+tROHU79QKupnreu+abJqebuqJuCj6Nxwet

auxEFytSHksFLNSbNpzJxE77akzbitMEv7URAqbuFS
tz53lPnscqF7H7Gu9bLPUh4p16ZTKFuLAxQPweTu0KO9dx5rYb5Zbnt/KbGygB0LzxSMxH/0Xb9OuPZ5

be8tw6/oMXF9K0mouyD8X4ciV7BZR6X/ipQBeShshxtxS0uFpqx6ueFEmXMmOpjrHsPnkS91NIRlIb3N

MXZvwySYmaF8FKZWO4U9tHX/n9Z3qMg96nvGafC2I3
ZmFJRUZs/yqGgZPRqZrbvew2g+n/zCx9CLgyMSz0OoAMkSkj0LsC9BlXbfOt3RAnx5PTLmvkW8kAmxyj

M+vbRdhrQ9k51ZADsZDUKTmfyBoiof9p/6bO41Y7bresaDhoOzw1t9J+3PpAzF3aSYebayr+4biQEmIF

WOM9wJRTWcbFbiu7i2pBJFPdytUMcNQtIbVVjhupHg
XE0J5FWVumRw62hDWK3CzByGtpwaq/kfKnlPGCCvGg3Gdvt346QsPaboFtSTeaY/oUQZoZgCpNsOI5Gs

A04Uim8qgBDPNbpGuecc7MeCRmQHi9AKYPDb6NFWLpUE3h9v48FdSVNHAtxUpv0Q96EGEc3FYByuOvZT

298WrzuuqDXjXu0hOdFDTsPxiiJWVkdLXdXMP4aG0E
yPQDr4Lew7Lub79tG5F7yV/AiLeuhQvrCkzu4EGHyHKcyy4SS7fZhfcyuY6oDijVK6uqLD8X6uMGyRF3

03g18ye/68SmJCBigZIx9TVJ/4WCu/SUyBep/gT4u8m8yCv1+5sNVlKp2aDQuiYAH4iz3qx2ogejxk3l

rS2JEYx3c72mV7bS01JWYhp7XM67g2Us5kt8+ZCkOm
tP4zJUoqRKNUTlAWJU41K5jqzbvvztR/R1Gvx0jcUWuiTAdqeujC5ajULODjkIvw7/+X3A6dLgz1xoo1

WwMsnFagw+f1avJPsC7cFhoFdQ5KKU5Pepd0wO8wnevCNJXQO1+QZxzwqX/8xbjtX5r8+bNs2oxTfm0l

JgTlm1hHBfL3CUxUkqtBPp+fJBSYYy4OzFATqvLb5l
83ZmL0c5zE1E/8oWA5udXBnRmueHOkT7AZz9YzTKAe3ft/5EPPdE50rJCcBicf2+beeJEKWpbGUUmUUF

cZgrpMKeF7+p/6pAIcOhyVoW0J4e7EpO2vf+BWH8+WIq15r1DTpgKeGBukJrwFV2lllxH7OxQlk/Wv8W

93HmcE9cOfcfmqVZYJlSwWLknmdkuDKc5qNoWlry+r
m8/9c82X8OH7CDITUGv0L1zS6MwLIhy+HfJuZahaQq3ukZmHSmTE7CcPqKMaGjCOLfP1HVccJBd0b/j6

B0j4abP83/DuyPSIND+uElIWTPPK8XmyZn6kntMXEZMGM/IP2pYWO7cr30jWtG6kJvZT1Br/KgfoF7bL

pIMFZ0xS4hj4dFzS4SaqVhKUFcNwLRq2kGntp+zexK
3Q0YGjhK6EJwYZ7PG7BHigv0JtmxPSEswfEVNX1Et9d3Wf/JIjeVVRHNunfZWNgJfornQwKhAWZ18O/E

G5pEG2pz/0+LXZrI+j33+Uj/Ju/UwgRp5GGSNuhQiTI6D/CcZAKbeOkHbMEfaRv6zwyyuw6OW7N/sP5i

oDZdhYuVjZBNHxJluMo892APESBhiV83ybPzGFCYGS
l9FquojTTH/zX7FLI3I+k9JAZF0udX0vlQWxbOuF8yhZAJL2IZ460QVaNFXPBmMqzdeIfifUUiimiwcl

/1pBU259Xxxdhll8pMIuGS2SfZIOSXOWyyHLz9SObjuRV/e5qxixxaDkWgPnJvULOCVxTIId00kvAqPw

1uCty2Vq/1mDm59p8cQ7PVte8v47lomOwdKXD5b/Mr
QJ2FLGVB3IQ+6ONUKBApaojuf/9EAaN9PgGwnikPT7U5O7YRGLPiNuVWY3ZQUNT5HpoWe2w3+5Ko41em

UV4LnK/K/KLhLrpst8+3sAkG+4ohtE6I+gwkq3cmwHfVL5BYlYgab/An4nQ1Rlu0Us3qVc7xXFkhXQTq

8vjQLXPZgtZulQyvIGu4CRaOTx8aHu76D715rd3iwd
VgM04yXyskG/YqgXFMy3ISYA4kVrFs68Jm3HMVZ984y/mV7pv/yX/wshv1/3J0bnLHLMNpXiGHR9lxMk

wO5kvwKuIyrTOcShBQ5cpn12BN9ReYGsEF6HXSmvG49yDMnlYr47IQquNNTjJaViINb6Jt2rLsXfv9Ux

V1ViWYv9W4jXKkimH0YsNWhwGP8jDpW3UNJsEi9+Pj
6bJYVpHJ57NTDsBTOPE5JavdGkzgQjRZtmFLVsGQFfFt8TUZ7ss7YzSAqbNDZnLirIJEllCTGrZ1GcKV

ZcNz8xzFLmDX1IZlMGRrIqTRSuTOL3VJCyVYNhCNBrOs6AnCxhGHLiHfDJXzPeRWF7nvRojW8sjbKlVl

sEBOUhJX6gsn15AM2IOC6zfZucTgJ1JkAnO21nnPXa
G5snXvEtC9AjuQhmWKHwNC3kN0AdOCgmUEUpXE2hutNhmU1FwFb1ROPlZp2FqBN0EDMvH74aKFQaGZSD

SDQte8MfNX4Rn5iyghGqROPkML3ZWRKzT5CUB5LkU9vhiSmmMHBcAL1IIXkcgJScJPs1ZL4CvXAyAVWr

L44qzH7fMC28OEl+AoQ6aoZqjP8SkBvjh0VVAV1GEd
c7e8bMBSIR0lhJpEEPrJDEGRsOCRqAtBGUYQcoDZaMvPp90JKcYsy4yZLCh1CS9kc6c7+Lc9HdY/RN/+

1g66edn9+bmqU5pfZpC+0WOZoQmQwqJUhmxXu8SEUOyKRhffFvChGiJqrtHisDDjLQFvSw6lldqrjV4C

p3zQh3DE/yIrQHCgDj90qVfpAVVeKLR/lnEQwCAgJi
QiM3AaZ5kztQb7F+U0k4NhREZc5pxuDSEimMwvRPxlKuvgeXQzIi3BR1BwB/a2IfKrKa5XFwBrATCClM

kwMwMbCwMLGxcHCwsR+e+j07B0ZY0UE+AeaYgvE9i4qpQi7VBN+Dz/D0chrEcrVMCsyQFArXtXpLudSW

6ok4b0RwKMSUJTxnQsCBaiemcynfK41XLdM7Hketsf
W8d7xJvOgpv1s3bdj0sPcOEMm/DcOIuKl1WUpp8/fUH7l5+OLGUbYiXjE65X1SFq6y5Z42cc7XvdfBkc

LzOsupDkbg0gyxP6+6h8l09uN/Ts/IXa9Th6/+2QXGgEW4A/ed5zH49PNSxY5JdsqAGiqPx78UOAYeMt

xrIikt3v36sWUPFsUdjx2oU66KnIJvd8N77jeQQMtt
ts6F+akot4h25xWL/P+G2e2C9C/pOZtKT0E6XK+GKzCNVS8c/PhE+C/+xb/4F//iX/yL//+Ft9WBRwVv

D+pQPTGsrdQSj3LxzK1GVUAkwTA8d9cuqvnvqh4Re9Vyx6zeJpWrl/9SGdJJafDze39rwa3VaBV0H7P2

PLAWoL87FzMbBj7DfnCMFQck/zZ3wWz5RiR8gaqiG3
Lf7wF+B5PKSjAQuu6H4nGpawXau8slciOg+juum9MfJU/zi92+HCamCZRvja30AbzVcUa7h1jOhL9ri+

bWJ8bmwTn0FRvBVzZJKApjh6a1snzzn/m3hYl45qAPEtWP4aRsVs/SPOfc19musuNJvDzJNMCDTfi8t6

7vCLqJzR39rGaLU5DHRskyQ+saKAcXEgD9MyctVdik
Gd9S51NDy2EEIl04BPfHMAcCFuytLzfssOTZQ8J2Svmr9xqSFXwjmz+N7hyiGYxpnCrc9bGIsY+q9a6N

sXGTmCSECCLGehjQTCOky60r+sTYEgKLaCEtVF3fR338r/qzaPoV/mT+pvnj+rI2CykY+tFiMf6nrnB7

BkurggZunZuijzM3iW3nw/eAT4n/UFbh/nblU8HZU6
9NPBM4oQ4/VzciA7G+riaGbglZdllNKcr1q75xQm1ZA+cpkWgZS/N0c7tIVioq0/YfwK3UWeWtLI69vR

sS38cNHEPaVho8ZmCAUR003KDNfv2iRY277wVHdyj8Z6TvFIPLeXxZRvBkL7fpAdEzIs8Mc4rvD6nCSW

nFUoXwlSVL0/K+/R0A1krKTQ5Nem5IfrkkDw2cc+m6
epUZoSNHrPSnTd+heq7vZBbSyzTBLcTvsXeCzD9brXyotw0Luk7nj5e0WKZ5eDhs9x32d00iAxAGOY58

oOtGZ1JXk5VPFunpuhlXkErJ6Thkx0KyZzJe/Mv14VeeEx+RRT/Jl7aOfvvI5xsFp6qbjQ2U2x+uKqYF

PIWYQaDIhbxqN4fR8wE3E+r57F3qL6CU/HAaAmpj9J
b6BGGUwac+hB1Q1NYVAg3roNeixhNFJYl7QDcJCvYiK38bnDvD9oQoexfr0pb+0q3mIZuCCKhJCNszIM

69w1Xbk/zIsQulscqL77LGZchiTqVa1vrfsfazy4WRgHOlXuDUKx6eFYEWb6gTFcCLbsqBhQI8WI8j8n

AvNdAIJNfxuLlrtW3I5Q/2XkF6XeKkZNE4xoni0jxZ
eW7LqUP23tRhqth3o6ZdClTO8MTtgfikF1qPqZcRr5psyMaJOf3BrOo6mIvA3puVNfMeonN6sD8ahzvQ

pAWJmrq52O8o5rTr8njhyAzg9DN6zU9krPwCxcskMcBW1TBLPyDsG0mepPCi3uod1zI2eXoJnlJdgiqd

e08bob7f6xtnkxkVTI70Z7vbV9fvrXD2x6iarC9b0K
pai47UdT2OMCTyBbUVkIpeNTYkpqq4WrQa10W80WT0BKt54xoDMyWc8xj2r98qOeaHaFXbsure0szASP

A9jpZ96fCV9yNF+UmoD5HuMvGjx/+HECHWDB+/Gkl0wn3yI+UJsVojY8gC8zH1OPMu+/qXzeYsCMXSil

TUh09/YOp/2g5yhRIZLFAgeyjr1j9z582VQlUIhKsJ
JsYD1lo2tShHnxHmcrL+Br50qheniR1usn7Pt7L7z142yp18Ifl3vmpSdJRYFWWpLvpII6CxKluCIFOv

UlpP7U0v8n9WuacMg49nJN3g3Yeq9uUqdiOTbuTR+Oi8VoPbBWsZKXdhR1Hu+qAnQvvfFK9V3yltxj9p

pk9ogt7oieZLdk8vjo5JSKGIzdxaKRQBuRwZ2sAr0+
flIaAsMQzIcz0m5FQMM40BCTI0juYnxpXG/Fg3MIq2VchmJRBNca2ReJ/wSUY5RW2Fddsr6JPLy2ZYdQ

/GYQ9xB0Z1YUSvWLz/4Nr8T8QMFXXDwSdt7IQViCfBOeJLWza81+dcwFwuGyE3BGoeJzLW+qmcxZPwXl

Y+HMX5mAp2twGOCuFZTiDeKNaxBWtrUDL8jHUMztBd
2MVrz2sF1dQbEKPEYj4vIWyjtOzB0pnnx7bDP/yqgBTh676kEcdT4uZxCKnnwyLPvAcWVlg/oOWRZdVt

GKZp4fxOZ6oMPPESVJwyh1n6bgILqOP+Wz2jb29ySCXFBO2JsP8WuE9F7aIl/tqapCay8SkHQq+BLMj3

7EquyvW7dpgSugqtZUrez1Zb9vaF8b2hBna7tvCWNI
CwYWni9pi1iKTDyuz9SCby0Rv8k54tDqL4nOQ4kGE/o9HmHPfoZQDuwW8tkBd4e41dyR04NPkvoOE9kF

OD7RZFt09kJEyMCMma1QXStlfAO2GcHo6145LlnsUDXZ4/IM5uv3TRvFPYq/69CP0skpBJz4zr13Sonk

qOxfY+85+bQ5TZz0x/ESYjXe+p1Oxwk5mSg25uB8PN
yqAccTEY8jRwdSouYq/rtJyreIYpyUbcMhYq/HkI185LhQfrbVg09Qf3nbAO/DU/2GG1pzTJmCn+JDAe

XPyS4oGH74yYTPq+60PXjr4DB5B+7wvTC9iBwAasIk9duNgxEs+FSYaxZC+wGMuWz2Oare+NvVjHKmWV

WQ/47WhYqxuG3qiOwAEi7KYwgfjNn6b9zxoKzxANdR
+Xp4Pi1793RKKM85QN9woo5u1MSCTmYgyyIINTyy12su0hxJul3z20SrNDqh9R8HabJ18NkFd4C72SSB

QFWOOhlcr0MxGK4vPM9D6u3HwGHGBcEGzqQWgClyMPziFeUK7ZroNo+41044Hh18bI1m0lhBTy7YNzEt

SX/AZp2S1f5JWTz0xJcrAjaXrs+Dnta/Boc2AL3adQ
C9mDZLQXl5xQbEgw6ZJ09+wRd/OyzHSkoKMIkhvZ6I+ndmHYrbBFgt+RlS86FHgReDS9jSepHKua12Iq

0/y4nOB4XOPOyqNIb16KK6K8vXupBTGfXgU5ROYNCbLrJ5spqyzz3o4QkKoznHaDHvSZOvJtniL+6lAr

B+dBpEhk7p/Vjv45wI+K/lKBjXoZdpRYrHlYPl8KyU
dzb64uF/w0NOAfjYIzTQOj2phalINWX4tg6HJoQEwD8yZr9RpJkAjeNexhkS+EzHyyOCImH+26MdazUR

gjGIBv76k/nuUwIPVF+c8zL0f5w1WmT94tVqgcAkB4sfP8E5mBOqnucB0KU+VCT+F5Nh7vi45J/5kmwF

EWBoLL1okkLV0GDdPrIlyjKKzoJXdpIVpZdM+MGH6i
afF+p1xHAhC0TixXfQsI02zCo3GkyrHeTgsj1qmdOeWOQnycloyeMkGSkuFfGSYJPCliQtAQ67LVqXT1

GJbpl7s9BxjjPxabwi2WSOzazvyypsyzThxJyhvVnMYaS78wVzlRBdiRQotQX4Qv3sEa6c3xya2hgHlG

PsiYGRzJXP9Fcrs3jLu02ggq0u3inEUM80QLbXUJCA
Zu7iKhHiHDfi10pZbSjCl8E87i+MYZ9pA6Qk4rdR7pHeAmSmotIGTnHuJOH/Cr/Yao7rvVxVeIBmsKZC

rEEiTXYCP7oob2eiEM9jQ+m8800L+B6E+DzB/RPg/jgaflrsF0iY003hYPKORV24NB3w3Lqje6DQx+Xq

FlpSlQZRYCX0SBJh1ypFQopR+12OXg/FuZFhVmlaBB
ZGNS78MTxdhYvWKLqujEp4iPHjWnU855UOMo5mT6mppNRauCBT/z4jboR5AQKC5hSsqKez+BbBMVR/qj

CUi4XeF94T4WT6cJm/Fb259zKmhsvXJlYTdZ07Vys67gd1JRBcUbzYgLbVf1vf5wy/ga2N8fXCgHYUMT

L47JVZgxiiFdrzUZPnR/GOwCjMMlPAwFt9easVhbHT
0lmonPH5DTOmG2kts7VBCba5AbzPrzGA1ZS6vdexUiLgcllXWfU8CUMjd/50c6GraJGXqHFRy/8p7d4a

QxFze9HAOPBOz22SrahBinZ0UxM/vkuayy2skd9O03JWud8ln7fyVlecvqxn4QFnSrOmOjM3/mFhIyX0

OgZ5MaY0sklSAgMAWjZ8mKKzYyclMXtaX+76RnCRSe
6VC+dOC6R/GkhcoWbiQ23GHEL9CsZLSFX9RNt4kCnxq94pNfsoIMN60WBkBwtD1tiIek7gRgt97Rlwps

tFIvPJ4e79N+gBFHkETlDbxITd69XVeKT/mv1eJPUvOb9sTXagvM+8ladKCDLerJaT+Io3rCVoXG3S+9

15Uvx5qEEjY4d3Z1qajxAa40lsIZK157/bP2LktGp7
i3M2NTdiTQUUU5/s1dp+j4cRvb+pHpZvt5+SLLYuYiy5zEVSxKRxIZ6tRJ5pn5FaCsY7K2R5aVMeesSN

tOegvLPiFXAZqFpLuEjL/zdXZlNzJ20FBLYFPjb4YD8AUDkHy3q5bHeXyjFYGnezs5F3w6pT++bkd25B

GYN61dvgwKytkUMp1LXeYH7rniuRdEt/TXNuwFskz5
PnDpvvmESU7tnsiV2hZHLGYnLIc034WSA6XjhduzK0FWo87otfmw5Ehex0CdbkXwm2shIt1xojdgsoIG

9tRyN2l3oLRTXtzfjTtSqR92MZ/kXpOzkt9+7essyhNbfMyE97lkSfzMeWpfTCmuTQe2poHAZ5YjBN7I

WgGfgZKk7jiEgbQBJEJvwag3Ioa8JurX6B36xbGWxf
1BD84jcUoQWF8NvZ6LUqjQsgW9Pb3UQoilK6zH6OsOUgE4A4GlqIjcRB8suau8hc1T96ZcCfLB11hOtV

wIuFO1kWROcCrRJDbL1LubtZST2u9i6mG2jB75idklBprOzqfA/mABbkAEttRpfX2/jhdYQtAu5ubCgK

t3tIDzyQUcDIC01wPJqbdPRaFb2rg/mTwVSHkbrxFq
5wzUICbViKc9dOcV257Hw7rucG6iwuRLb9PXuowExJxioAqPcAPYkd1uBNrwXIxapW9FzlGsoCkFvMWL

mWG44zTHuyBwMI2K3wYFixJha3xRcxxUTpVJz87ux2dm3Twm13kpvpb68zOENMPT3lS0lcur6O9Fp/Be

F3suHs6FcUzDUZh7o416OHP5AkBD0zeVKLhxNvKjDc
W9UarwYdrFbZaU5HOnkJKVlyy5ISsF/eKx0MK2rPo4mqoPxukyP0jIW3vfX3eeNK5VFvyF46nnL+A3nY

d5GQ+nDCt2ELybtssufiNuO7QCw7IRXQ4Tx00BIr4ijyOnnj5DU6PrHxYAedVRBnmk2n7d5MlQqQPrVM

vFnV3DS9kVoYBB+qS6jB09jLV9u+FWmH6BUkjJObpn
cfDqCrkhdGG5O00dEpgp4zDDJPphLBCHjGiQrSarsyWHfc0qeX67+utfhXXMwlmsZvrgcsQtlkW0mHsJ

OD6U7VNGFAU8S9vMOpXpeqH6qInCRRp2mpLdoUPc3pLFHFA4uyOmtTnPkEDie0c0qtto1Rqg/miKLri2

1DovoSAtvuq1FNwbry/ME0QjZPQyfPBoP/j4kJF5on
1EiZqiX2/hw3JL5B2bFCGkP3eCtV4HdEVsvEo9UXT9Qd3Ynpl73K7CNZBLspH7r+P4I9o79XEvbd1UsJ

BIFN2SGk+WbD9sBWQvV7W0FV6DjCxibecJ616qjuVbo2bSZsaf1Q2AH14YQFSyEk6+oYUuvtp3CB8lOA

No3IQWi61fap4doUxQhN7zk61E+EmUycCK9K2o9jXq
Nzfu79ou00I57me+jD5WsRsa5mO+Qpn/vbZeA/RhcN8DPAzPkzW7+sutWvQ3v+iYDDGTcmP02Yr3pp62

cYeEEob46Vkl2aUdrJmVZBRGLKl911lON6yTHOmWqRbyMferCCyafneE6TVJQqbS3DuPIDsz0I5IlE4T

ptC6MmaThWgD/ygOFu3/MY1Su/cyZ6QE2eLQhH3dnb
CroFRBMUZff62d4x14jLYqpfnOUxVIeOlPL6Ut8z6l6vaeiy5Yl3aNv+6lq+IGpCDuie7HRIYLBMfCyz

F32zmaaYqqyeGQ1i0uhkrATMCQVxmACq+MkDyN8Qh84uQKGzaPgA9uJebGCId6TRFrzASXjZ0FTHekB6

qHT8fjDrUoyTi3d9ax+leGvJ0LPaNVQ5JnDhGZK9Xt
aq6uFmx1lViK3eamPrKq/em2Z1Wtr83yHRqEuSPJafiCp42gX9m2/CzOiFv+VdRAn8cF4xkpbP5R1KrQ

ZuR5xnd7whhvvNlx2fAEQeCFo7K8U0S0IpfSSz5ENX0U5JVFtGl1dIvav0+FhKvaB+su7I6PpjWd3ny/

W3kzfwaQbib/pYl0Y1aii3EK4WO5FJ/xCyBVa5GH5B
2mvd5xtE0mtjjJu8f/qeQF5pnj5gXMnE/ur/Bl0eMv5NjRRa6P3JxSIdzJhk8MWGnr6M7PSKGkyXGgry

F8YH+8UT1ANlI4gEuvl4LgSe2EkHRc8ztfsEDIsHdeM6OrKdgnc/eBNoa9ZCsEanaLKtDFArA1mqs4VE

Giyux0ZhsqT65fUyZZdM1S4p39dXKFIhvAD/82hJgS
fx/DtzAGmJ9JoqjTLjbd3l1S8gdcZwD+XdyK8bAMj/6ZVdaiwdQFyq4nZjaN9TbTlnMj77HLRezs8WdW

HIFoy0zRhGwitD166h1tqGjjpnWUgS5M0Pf/++9+Prw4GRDXone2lqErY+fBCrh6hcrqcBISaaTZjnat

NGeqrh75lmMuGvXcFZe+m298CAynaVP4E/BwnoGS/E
j7BqwfobDBW5apb3kgbBozfXK2pv1jii31mAWIp89+T8ja9mVAhUlLNyTYF+hzDUDW8Ny1h657rSkDbZ

9kc7DuB838tQWmGMyO4KpoVZv6eqU8foRBGbGzi7PXW8KdczfvNdis7CAUbkKBUI+B+LvjPyWu/p+wrz

anjw7yp73Tudec4m26VJemGIT8/NJmt/nm1yrKwXAB
2stQpsfkjaYpBX6ihCqjb8Wr4GJcyLnqWaoYa6UGC3q4D5SgfsV/rO5tV3TSXiGY0xwKXk1R+iDHIxBP

MjIdiXUG5hqFFxRqNfWg4x3OIlT/muHhc2q3Kowwx+cXRm+anXAL4IZVp+xTw5vPPqbZMqOeUEgIKvEu

xglenWdwPTpNwEL6qt5yTCAYX5CbE3AtnJJJpF/6UI
AJHUGrO2KQbTbnxivAWs84L6PdLf0j/ZtEh0OSQSLxQXsD2udw4xurGYEOklhyhBLxVVerAz5spegkZm

mBTHkfT0/2fRj+aEXkfycaw3ib0gJbdotFVB3H4+x14i27DsSSe0Pbq1ZRd4TO+iaL4lvdX8zPf5PHtr

Bl3zJwsblvKRuopPOzFPlCTB8/J6GgFbzvNMVYEIo+
g3rzwxJk6R3ewkYW4OYiScL/sb+mprxZyClmqCovDHi1ni21zYeCLY1N6wsJTEcep8gEHCI3Dh2q9tz0

OYNFHrPhzSJILLOBTkPdlYtMMzs7JsEj79H4gNJW5dPFw67a/r+GjFL8WqVvG/AxKjuzVWK2Ohzq6gBK

IKMPfmhZC8MHYlknU2kfhvbQHHvRwjwLOQGFS1w0bd
iRyZmwflB3bxJYyBWQAxU34HrqoCtWGZb+LyMR23S/c93Y5UylhJ/pSvpVS31/Yjj6ljeDmzR4drsnEg

7Ix/60+yyQFbcZcYg1HqYBtOgaBkJZCKWGhicBeAt8KdDB80j74PFnRS/xZCQ99jgOYFDfnTg21yoi6/

CS817vZ6YS4j3F6//SagQwrWbCMO1qe03df8Y59F2Z
+WsQkKyr21BXyZ2+Y5vF0tC9q6QVr0wi4EXxdnmr170rZ8LXDw4vQ+VssVIix+tujDSJp93nmhtx+WF7

jwawY96DG6LxakXV1SjGOyzQpSy2hJA9C5dqnDvZL6T7MTAyvep2SKkWGO2qAtLL62ouj+0apo8NyVCq

4GJx7w+hujJiSqVPWJK/ZNWdILjvH9Ev873eNgQIK1
krm/W+4Cu+HH31Mj9VIljQUK4K4BNQ6pRK8pm9pJlZC/FZ7Sw916676uah9qaitdqIfNSESfb+TJuv56

z1H0orFylsH3456FWXzaw5OFlx1y57K6UahUKBGOkwhd7rM6O8pI93hqzq98eH9H08e3Xy8zRKii3S/f

UhzKj/A81Uu/LUxBaa9nuS97QfcP6gQMAYTFWKNfaN
aALg+JO3duPfQcyVYWaIHqWP/nu96FBWSlEc2yBO+xvFKwpwNmYkz/5TOwJ5YqguZmlzeUWJm3aqbImp

X/JlzAIofWQPaVkCcruBFR7xL+gqU/+L/wmMU+atKHTxcdNnJKb9GHlEKp49+WQg936CjaOz+7f4+QL/

5vA+BMNcAKHlHnw+yE10kpn9kuZ4sgr/yblRoI1FCz
du7VjUHJF5LTp4aXRlIXhHtBAylQmwOrcQaUuJLxT17lZkM3Pa5y6YWSimgyoz6z7GIfZw37vYAUNf3b

i/Jtg4UtJt9O4g9ZW8Mat1L8WxxR/7Sz9hKOq5CCz19rMX5d70Afzpy/14SaxSIkb46ejagd9KX9FTss

19zbI0jOYcyaww0mbMcIyVLoxGtLRgeHnOm6NoZBUu
vVcOw4pz8CxnzGVreOyRlY6+UkwFLQInu/bJaz1GN/NR7V0SFergBKtw6TMrSTtEpPSCSgeLvdq7Dy8j

RVBpBfA4y/9+gSCEtCXDbmgCBBZ9J2ffYxrLeXy9S7xgUacZwAUjybAjyw12RlLJfazmZ5b/UUXlG4rQ

agUo5zUnle3U60y1JrJ8nqvgvgVAAkOhh8ZJE8TGkJ
MVWie6r3jXGXArQ7sdfOp2umq/l2Kab7nFM5oE+9n5QiW6nWBDpKoQPGI4XqSbLh6nj4BXXCBlJmjGcZ

/HsCJsVrA0WIjEZknnryCzxlbtBBKhREgD3Aixnd8Ko98OV53n5Ke+B9g6/BJz1DN/QMxwScDHuGK61T

AQmlvmiyOvrnYkh8m36Bz8mdFiCa1lyuEqQdJtPnWZ
u7eTCI6h7eQl2uOXKOsYybzhR0WCtEqf1+hIZB3/cQ9yp8cGapKwLhgvMObJjD8C/8X1baOviqgQtT1G

ND6okiLkqAHEHDIzMgapt2GlLgy7jmE6mESBPl+p6vywx1Uw689S1VrDMiIqBsWuRu0dmswrAfEkP5ZI

sfSr/cbzlTWtGu7YY1cW8iiqPigVy3LF/lXguD1W+g
ta3SrMIL7fvnwlT+bSM4iv9YndnM6R1olLySiO2/BLv/6c8Y4moXGk9xhHirRtbU2ZdFjcg8kQIsIDsW

NRzR0VyDUZzAk8Y0ZWRtaKdzngLfypkHbU8wmqbkqY70/eqKwIPCEsFWYu+379izC+v0zVgVwWf9R5YQ

ZMwh3HY7r1VDyBu/niOhXOGFIV2+n57o9Nu72y+znq
SCvJ7HxD+kT3IskA7o1755vzl1noU5rCIpunAFHGLVGkuxHuhQcl4DorKccxLc0Irwkf00ygxlolipzk

OYw1CgV3FunJjhoZwqkzHz74p7NK0FXws1ABmV3mfM8rmDppJu/FM8YLRlJK+gOoOAZu46QlWDLE1xuT

1UsdGk2FaWDly4Rk+DfiyuGZZ2tc2jZzR4wvXjvYWg
Eddog0xuNaBllxD6pnkrmGYOZlH858XTyRnjxOxnRJS7yKza8I9IRBME+F+923cBkyxf+xx9J13CoRic

cCyILrqjieRt0gWv2EAEu4pkIBTRaqbUoEyv3ur/w1A4gLIsN7NRqEsjVWfXd71tO+8k9BLMpgjjq/17

o5PsLzHtKh9ee2QoA3LBKM1B8lj12ZNksCuZlDG8jP
5F6tm+SRgdAw4chSD8F25nFYQvGWQZkhYSjOEeI7r3Oge8Gmx57Scmz5OgMxxf2vkQ+0kWxeyyoETEqU

Fqs+tFc4UAHVseDsrt8ULBy9hSnnpTQHKOyhhWA+d17ugZbpJU86UPERs6F0Y6nRF6APMO3KlY+pjE7I

+GhMQaBRhK4W+TFCdpIpMG1x4bfVrARvKI6rDS9YGH
mqQOQWryVPQn43SdBGld+RDsAVaIxkOvQx3/k2Yu2Y+ty6kDxbmFUf6lBOPU9dpV+lfjuqFv7Z31hXuh

HCd5GwSbiN1jxXaO15leL9bxvwyhTa3AYi4ZYVaU32yIrPWn7XiWwhJklqhZ7D5Sf3RSSCE8cvfPpdaA

8ptK6JMFYR/mW5TJgF60Z1ZYqaqdklReJbFZxpjTWf
5dZurB9B4DU+N3Pp+cpfCdcK2YFfZDAhTzPt2htqYyZFePKRD+P7jlTbwt8oflR9hRLd6mYIuVm6Tzla

E3VWEDsd3fCJlGxtsHIiNSfEtv7yQGzAsrsefyC5ZBzOp4qkb/ITqq9kvLcpxkgyJsP8qpL89psOb9fc

nmB1aQBG3vBtxTIIbzZqhy418JLup11pMdV5pwl34y
ykhz7/GEOkhwAiummW187EJm4sZxZtXrhTKt3A3Xb3og5xOzZiPOIYKFh9yOa9s7U7vkW8tAkAbv+a9b

AZ1EmPA+/TijoyFkulN+Ah5PxHWchJoX+1CnJo5TXUtvLtMEQH9HH3Axl5ZdMZRa9WK8n0zTlOK25qWj

ToF0kf7fceIa8MqIEDFLRnJ46RFTH54GQ/ltjwc6Ji
ObNRmy3MJ+h4qdGuii2m+S+GUJmkiBYhLA39SRZ/PnuV5kqqMJmyi1nJJzJlexyTRmVoumyfKW9HtluM

awXbY/XvuSUi2mTQ2FqwhUWG6zsky349K4enH1BchIG2/+rq2qi/VRxmbdbMj1RYE2gKxPo8iUgqU+c9

93QuSLlBNW9HpAP6BIJi8u8gjzptz0cTNX0Susaih7
+aG/nbyGu3vbC+gvlaK5GdEKrr4No9zdULjF9/tqGZK9g5WtcTkLGD+Jefz38+u7kVBzt7MPzT9LRMDj

pbJ2nO25VNhuqFPFJdzO2URZzN0Dz9D8fO4xIfQd4IhHfmOHFheHpctW8P4HPYTbkqUYcrl3DnVHnMjF

/fYEKLtg8zhnbsKPzYMDIv0cAY7BCaEM6v7aH5qZa4
mYJvYaDxr0G/6yL0jThzf2Kw6bimdvn/UDhpuMIWaszKzTUwo0gvi43VxLnhRBjOb7rN0MuDrEQ9q6du

Ht13eSrJzIsu+OQkPLaEfrWbf2/QX4u7DJb3CDiXRF5UG8WhmRhUFfQ+HUxdusBHAawGwEr5W9cAK8/W

R6UueXfz9cV7U5HH0BYtVfXYwVi4Z5cegK/+9o3Bfk
gEnFf4CFWj1z8cLskh+p5kJbgNtnookS9e/rxKLsyRooaalhcsjJ2q2s8l6mtqwYzHJjc8L4WthzflT8

bo7GmrDIZ2CE861VPc9Emgu2R7kXuEEURE+V7Haz472TTkqq38X6zBkcw2SWLpLIA1fjDXKhKr8poXz/

gejeuo+dRqas08xO/9qbNZowUW+7wNUxh/hnQ/HnZk
K37cZLY3n50mDnIbtE83NPsgSbchCeCKJkkBdKHgZ0KxYqOr48B/WglMAN5gO2oKHgOnkw9LBuZmlJ81

sIzKASkeHqORr9huA04ubNhhEB9Gy8z8fWzCi5dDU5mt/DY6HE9hU0zmqQkUGFJng8FiWFZ2GidQyGFx

U2k7W5ouuCJDFaC7jxCYkjUBGV1HmbenDJ2Z0EcGH0
znfn9s/fMgKNbnnAhEAd+5qJCx56lRC024nqkp2SQBrZ+0d4Gj57L35lCUd0ezSjnHS6XrP6DVjYJ1n+

647yDDnqY3H1cm92KVbCopm5M8yazjgzdkJsgf4xHAnXHvOZbXypp0swYBuvHvS+tpblwvwU/ytTt4rZ

kLf0VujV3Ab6C06vATVrcL/A3CP1nV5VEnxeIIb4dT
GorrreHrbatVLAZv5fjw3U6KJWsc0UyuER5hXn+Mz7ctA4kDrG80c0ZCp0jACfckI6DHO5a24DL7TYwz

sRJA+T0lHMyVLjd9/CxuV4z9170uaD61WsBKZxnonUj7pHwURqiv5NcFNbMd0O+8Mj1H0A+391pkHyj8

idzn2q0fdSO5FWVoF37vJ06cBa3EIR2QpK1QSIpesw
7dsihwa0xCGJLdI2tToeqoto/X7V7lnFMTBtdliQPHy0NNy9cZkrKXayLzCbvB44c9qVyeGLQ0nBt1Mt

zQ0SQ1lVp1h5viJewX0w2T3EYVnrUR5ky67i/X+Uk0GYs5nfk2Z5wR5RVzJwhV7A9cr+VFsclBRnU5/h

dgV1UEjXqiVqrrnpYlte02jafHmrbyGBKCzjAP85U8
BiuKHCcMUBbHroJYHYhVw3+D/qcHWmZbKnzaXcIeVSX8LkYtUQp8+kknZdvGZO4KhNk8P2a5U4dPzzqq

JBU7IlQ4sK65ck9co4pboqDrl1DJkusAIhIni6vShrBVbK1kdG9nicNsEHsg5L0JhHm4wkibjhkX+BkU

XSn+k56CsahSaPjI232XF/RoJbCAAcvXV8oHaDjsI0
zAL/i59CC1TtMO0Tm0lKWFBHLBPV2YBz7F1Mlju0WLAb+gJ1+JOEvwyDjLyGfJo6nE8zv4cXDwdFLYF0

MTN9BBAZUwyaBiFy/VSpN2bgljzCtVTwkxUTnQbYAuABHdL+Mw+eQ7taLNQhky9BP0Xb+arW5NUP8t53

mMU+CFXKzB04aQNZFJXwbE4VrRknxJCGSCr8i5DDAN
kFU+Ng9DsbOC9g/6+NaY5qlScI0+byyXn2+CVLdtVG8libcJsrE5Vf3xKzuSsGWr5dgJPVaEeAAl8h9c

2gm/onG6hkW7smjvRRp/jLaLy6fnPc+2e+3QJCE/iAAX4iBPFIa7koCrHVzW+O08f7bn49b6ZJMkFZ+3

NenvfeR0cfrefN3PBhUiDMDbDZWG3CDEVQ0e4eGCdi
16+YAwGtuOnP0MMFjjv1A9VZKRCI+/ekopHgcrEHcyg+4ZIwgbUk/X8OIODv3/MkvoH7NSj3/U0ctEkM

go5aiH/gOUgTrMqTPiicKgvJNjwNtlgv6H3ajCdN5ntBQyiZ+1IdbpOeQOWZkinHLPaegzSsJ7/YEnms

hP7pyX+ym7SWpw9IfX4frcwZNgM8AAxRSRQ8j8ot8w
mo3MwckdZvY+8ShakziU/YyIwO7F00cI9kyrEn77YhSal5PpHJjNqn/ro4faCEFnEAiLrRnWqiJdRHBL

zqBFgC8SdLtRQC8fNqjtM+WNolMFCNrOiD5vUKsNXqTwDeY+bwbkbpeaEa0cdgD991rZNTd0LpAG0sfV

9KyEH3SEbIe9e24ILRLMzznuMU0/Y8DccY1DffHThk
h9nOzm80fhyvmIIZaj2Qzt4QfuQ//CCppPeOpOmp/OJE3awZEacltkokuwcO7UxIKYrIaIdeufc/GvP2

bdTbh17VjoRJUd1Al8C+tJOVC26CapL/1/xyE+ckzUNhTG7yPSofyDHR97MeLMWvbxrpdfH+uUpf8WMq

RPJHWUlKbeW0uqFl16czahpbqI2rqMTgN52nOx0Y+G
eKKFTEHndjTNRCS6J4wSwx1dR4canJmlb9ZaB/s9oRIH4rbMLzcSt77SupdEvuhJ2ZWoD/Es41hNTiph

ilmnRI7IKRYy5oh8Z+YPFZBWvtQO6rFalBmUpYvXiD/E7EC8hUlKVxnjJV8xo5nWEX89/sCgvimuRLzO

dbBOkyHl+HJ8rLvq9FJk+ACcQAm0m8+FVNfPkZSPRE
LIv6NVXslZ7I17qPlqM4hISc2SuPNcPSNbkh8BZUH7fRYpBv63vHh6QJoAakWjijFFxuAtWEeqthSkFF

dH/89UUvd8BMUFTsHqNrj0Q4eWJg41mulfBwFq/61iQKASfBC43sPNg3bEqMjsBylgvUKBqIgZMCAEzD

QGfHF1VPA0n7Iku7gpBNT37/u9C8h0Om7XTUTJ1NWj
PV8QtBKnz12HJel6lgrkwDUlYuedyPYshbXYCMGqb99aot0kjQLbl5jTNAg6qZGuCYO0gtPXODY7+YPo

MtMzeegbPAxa/JE3mu8Wwal6i+EzQQnmPxDH7dAr77HMz85IIENL8YCw8vVjO9l11TOkn0jFPddcPsd9

PpTEFMVvYCbNhyzechKVexOWciS2fm0deOhFW2AqPS
t8p7CsM0ez4MyDNZ2F4DWsW9jxpfpX8K230YTwKF0wJoBqZ7yLZMFTFQP7Q0uGQX+t0uvDj5ERvyFZt6

XKOZcnUuvYobc56Yhvv+lD8LgdHNZJzFwFYNRzL/RqMTtWci8v+TVHhk0SPyolJbx+z8XVniIoVeSXD5

+xWBEYA9yp72To8brKsYC6ALj6Fcw+2a6hZEsWR4LL
3OjqceKvwDrGVEmG0fsU+b0/+FAt+vRB4F17d7lESlRbeBWfupHlj4/hd7DMiZ62I6sPP9qsk/BOiEkn

cewcVCLnel6lOfrz5MdP9/cfLEP18K4OY4OSkhWD67RbAxb7qQbpImtxE3c+sgVkLEdzqxD8A7Mf/lqt

+zZhe+NQC7ULmTeprI49aespPG3GJLkMyiNSU5p3m/
gmTiarV9/dtOeaXvRDY8E8zSxt4+ANm+erCloAKy+1Fyr0B2BDcqCDsejQf6eQzZ1bUsH/9WPcBgGkHe

hTdnwUWo0lhSHNM9cEGyTAw6HNlKxZ8Jer/GWkCOodIn8wXEkNAH71QIG/DHJ02YebdrBpZnzLKmJVwq

zSFMHER5Gm5QrpKulD473nvWr75P9T4mjLoa8gvHf9
Dp2gvyAq2bF1bzNaCgLl6L6zUIJlXWbpmkP2aA+wbQMrWI19bVuOWHsVLsRYYnOEGuKqkeDuYg6GwAkZ

BI6J0RwzvTDYCfbSJW4RXJ6csrc6VkIV4NkvggodhnB0NnRgYBviMH/W2Ik1nA1qAR02391tDGahzPub

8Q4khN+vu07k7EWyRNhAqBcT9EhzP6O+WZtYmJmk7h
nEQHL800us7NgJQvTErmLsihnPjJS4R+a6aVdNAN/rPfVWGrCJjUlF4rFSS7TbE/lSHAqzO2EEXBEnKS

7zouyq9P5a9wyB04xoOgZfKklP2uCxnwjGOXxKxPaTzOc7gYQ+Zqr1JXsdiBkX4iXh01fgS9h1kqmwut

JCGkQWhRJcf77PyT5XsRZtAhY0Xh54Y7p0UDZWuX29
WPilxdjrwlvk6hxg2VRKP41uA4i+yqN6R8o16ulhIvrGP4Ka0i41+iiPRk+NNBwmYLFnAt/+unr/SR6z

5KizA2zkUB1tLzdsP8lofI0NQTbHzAbQZqcSJ5YLrxdtcu3beC9kok7iKI/gRxVXSeJScyOiVn9gVCTK

/QcH1B3mpM3MydlHzoifyTNkvG+d5nbzvztOW6XgNZ
qPzdgGNCJnVp4mGvEwu18EdDL8aDtnmeQQBQ1xPaRc6yO/HqDWVJOsiNXeVjNEjPNJiv/w9CLLWoK0Vr

q12R33Z7uonu+WiB0U5qRdVIJSYwMeGELIQMa8jmnV4tAsdndhK/ACe8IK9rdAIy/fQWM+BNGBc4DIgh

mZ1c7n64CZ4idgBI7V1ZGAuhWBPm3J7OpqQ97/HHbz
+gRHumXbuEpPNaFx2Wc8RMh4ed1rgTm2goOpHf5sN+LKmUrVlEwk8HpP0yq3uV8IE+d7q3heWC+MJnWp

eXl+1tbAjDwD9xeBUoNIy8+F8PiqI38IM1dQ/cLFAO0h6ujyDwFzRmil/YLjbNFjrWScgdPyv9uIko1r

58KwjP2WHiObFtkYXLn19w1+fWQdBV34JJh8LB8wwP
s7UGMI6UkxNAtRfJgdaecr5DNHiYnd1rKliQdazx1/HfVuXyz0Mu8BpEHFcU9Z5/aHuskSqdLVwQG8xY

u47at18UYFVB1qBoC+xwht/9dfAhBm3Mur03L9JsXvUD8dU2jvZJYiQMHC9C7m3Fo8tYATksNbKScfeD

MyMwNlIAvAAeDv1oW8cFvr1bhcMbF34/BUNfj9P/ev
uYOFet2vDOxhqoDXsQ3ZV9cnXBZBEJfJzYK6mpBA+5XSgkmV5qAnRC56yWLpwhykKpvwgUa4OExB2B/4

Ji8lN3fQwc5ueyGXHj4s/IOqClKVV73uTZf5rM2x0k3XIAN372yEWAPJm9b6xoVot97hFGjQgd7RpsKe

W12rLBCtQGj4+rt97TbWN3JrgQZvD9CjPoC8HMOPEc
Nt2jccEqW2nW0CsBtDfeMMdhpvQn09bhqOfhct8rWI8VJMZTI7Wy6pRTKZPyV8w2cOIbUTFvu9KLue3r

BNAnWEm3B3RACmEl5N/TXrs6Pnc2I273cSZgKgmbBl7igWLWF3+cL9jCuGUgu4rAtHQzdp6m0tvfX0hf

RZzFKeb3RRh7ipAJCh+BAi3Oo7zuVYPHRM3soVr/u+
cHdZZpp2twSn5C31qRAz5cdkxAwP6nChRNmX7rz4n3CGA7EGzvZ0of+vmzEw3Mu9bfIUikSQSli7CaFB

HDINpbZW2/1lEtT7nwsosvEla96iE8KcxQcsICzHjhYP/YZDHVMgkYWgpO5UlYLLC1KknBQ1IPLwIsSr

I1tZnosoXTw39tkqTy264LGgeVEPAYd634U+M16J7y
SwDe2xEmVuWF41B9d4UWzxZNVxCtnjX5juHFoDRgTyzvRnTnT6OaSl6QKk+pYCILl9gY74DibyzdpAX3

I4KapetzRfKSGbPmjqUgaTcEW2UKidjsMDA1IcHHc9P/1Xlsq+9CCvEpd2+dxbao2T0TnrVyQS+tKaoD

tvVXayb3odbMZ3LOXzgeuRHm5MnUzsgnRmS5YyZDR+
UgyYwiCA1FxOVXkX0l56WbyIuLXBrw0Mr5n5ro7zmMJ2ptkd/1QdhmDYFg8dss+cwgPSCuavzzFaPjnI

qdOSOjlO9mLJi+NWbUenf0K/8g4Fbl15Xg8m80uqooFDZ55sf6ffgg0lW8FQHllSHrwFbqkvDi9dy7+8

IZ4PZb4qcePH54T3qc+/7a3IkBc1WTsj4uyRMO2WNq
Io8NgYr16EUciWAwyCiJOmVIwwk2fWdyJbjoynf5vi4K1XHkGoTvz7S+ILwrFJFKE3gO0L5RDwVB5vRh

wK6tHE+eAm07PCFmQFXViIucgCecq1+oL29QwFtbb8AFfBYAm6TFUnUY33+4zO0X2FXyHwDqmMA7A+Oj

tT2zZ67kHY/aGVldiY70p9SA3KGTN9Ssl044junTe6
9HLYJV1riw4Zec67Kwyp34HY7lyMawy+YyHLRfezZhkHXDjTnQsp246v4bVyJZH5jSY2y0ca2dgg9uqX

gyMFRolU/0pqcUmDt6HKf7myS1OHr1cjvS9osIF5UxHofueaKewbudcAZ2LlG6MfynTiPZzihivvMB7A

HCyuVEUbtq2VduAnYefLqyXsVur2i04idWsgsRsMe7
hHfqLF+0Kt+VEC9k+3CpjlMcVi0VFkwhkLUmR/cYyq9d/83MrMSIcQ9lRRWDpi1cJrqFiW/IcO9g7Ixf

Phd28R4iSCOcN9LQNdLfQXj5Dtu9Ac9wmokxSeGWLAE86EktssXbGYBmH1DsMswMik6S/dp/38j9558p

Hn6o8W6RVu5JJ39e/M5qCbOXmzc92hUPzT4LMYG2jf
+ugu1RmIVwWgPO8o0KlLfHXk9uR8M2CNBzWhyxkBDxj4Ta/F6yGYu91qgQ1INzeoBZ7BShZVGeyGLjnC

xQeswUGBxMa6Fey5Jsx1ixcFir0QUf2/0HxpKeEfA1fdUJa1ZwWdyhuqsJltZQ6Ucw3b/GabMZFM9pzU

ZlakusFdjK2VqfURtdw0rDy1G7MLGdmtBshG6FTjRq
XzjAu8oOby8o6RTIwfKMN50XEsdvfWbEZaDCXWo2a4y0hNoQ9stX60ZoeUjYf7OSjms1v+HE4gVo3sOY

ZtvJTC3J0WHI3/F5gcu9xB4UkhpkU+oXPK7uKvn+blecM/i6+lnaL8Me8m0LWz7yqtdvpu64TUc188Ce

TNU0uG9R7U/AkoCS/Mp7z1dkbfew2qTlVveN7wLybV
GgMkZg9N/rHv8ldd9Z/z+09I7rpEav/6HC3rqr8RlQtmLRH7FJgsXhsjzHcrW/muBRbM9Ek98MbK8m8K

dWSgalV4TOgt41BWfL9AiSww0asvjMfiLkkBa734lh445dCGYbecvBus3OX0ySYGB/Jxuskg9CKvnSXE

TsY8Xvj7SdQYfcF+VLrs/0NjkFos+euRGWC1Om/k5G
QR6LWlo3fXqhpCeGCnONBIcNUEzgB6bQonuxpyzp44dO6KhWrjgc5BJ0fLWTBbzPfLPdEJmfsl9FGj15

LLWwPxYIwtz9AB/8Gxzt9bvvS67EHskRRTFsv1Ie8WQ8cosr8MyZ7GziXI2oCnKGkWEEXw/F5hKaoqQd

rt9XC4zAm8eR18aEP3RBr/GKSQeB1Cu5Vzq/8K6etP
CFkE0DbsiY+AAPD9m1+uhi3ZuXPZ9UYfJ7uM5kVIZrYB/aOGHavvtYlk0yJV95OZUf5EZcvHPsF7K0DG

lS55sti+MdWSQDPJowGxnRSc0ghZrPqaHcHnQT86RTcCP7txRBQn5Q3xEtiC70Eq2o95BUtwoKeXwuc6

QHSIuiLf2Tr4g4Hkg7xcn4YtbhSbWMr2gsxHe06OOS
xwM0oN/7HsvzJaK7W06G6mMd71prub/6WOMK6T2KQ8xroh4jl1ExQv8XcVh+qflFaFMEh8kEwjWan/tm

wdwJf3CsLrojPV6s54MFdmZg1zVEFgfHsmA+sPFiDB6Zn39LsLPfyaTMCLLeNrFdtVKODv1oYbxyxqCp

FeSf5mRUryJNxImvGtPv2zaICkcnYQeiyUTeap0pZ9
2sTmQp69lyUxJSfXq7gqW/OlaTWubN3B/Xo/7MDy4ocqS6gG9zVFJnQ14JMtOep4RmClPVu9zGC8RYN2

4Y7S1WWBCZDqomI1xsqJr56sal0DDP6brPgmQn3t9WN9YRvSuyXOs99YLhY2KRQnM/Ct9/KY8amljh7L

WHndV+JYn9QwzSlAu5hosHenY1JfVxdQOrJi8SwrF0
l7R6h22pjLaVFUUHuih+sn0YgK5z28fh2vsfqihecmd0pNRqBv++HeX0QXND9r8dSIadPMiPBvAc0uUY

N752JHWQGjbifOjexsX71L/BJAXozOpurrUDACJ5hSk0vP1xO1lzn9CZIj/R1kIuR6O3f9ef7lnUNYRj

X9volr9nZ7hzrnCFee9mmTRZLcplk10rIKPKvC0qXC
M48DVxYbZN1eivOB+0fUJOQa3twJYMkP1wdM/phEV8QBkl4XllkEX2JBas+y7+1mWhUOEu8ccCytUJKs

PJ5Ga1lahqYgFg4wZxP5lAIjSfvOouUy81D4tCh7bZ+acBUkANf6WYe6YVSLzQe5yIoE8F7lsMVBle8b

FzimYj1XFXBeTMrrxNEhs4dcsNVLf3MfMErIF4wc1O
B0P7fpZ6dRG5692lEpm3A43BXxeD4KskS9qmD1ve0/jpKRqu93xTeeauYvJjl+q9neX5XcWiQfDDdpm5

rIh2takGKWp+GDAp/zPFzzklmkcki+V6KlrSG7JI0HF+1NkWC0+ItVjyUGgGUTcUGFTBsFmHRO6P1lBo

C6fNBue1QcVqHzJZuSlsn2Wak3gu2C2xMg2qF6lHX2
TlmaXWHP0SUD2oXZfGlWKs2n00XoB4aw07Y4Nn6i1jY0D4veJKTqr4PnEwOMw5gv8b/+8RQUBQPFMvo8

ctBkc82bosNIuYVmdR71e2TzO0SjVSR54TkJuzrhP1SEmoE5eePXKnKYzbOBlW+//oVYgh2yM2Jln8u5

099zEubsMNP7fk8uNWxRUGJQ+19pC306PQMzp2YpJe
PiowSFKxI655uKT7OT9NsTGun/EQpyzrGpXtVKpdcTc9DJJId4VL1ulNfaVwgvMJWbunK4jPZBKyOTfe

Rsb8JYhCC+gFo4D2UBkIfBQ9kR8uOlaLmkp47tZtMiUd8RM5Q4k5f0iKgoiEhZdCexgBX+ZNMft9/veG

XLlkIPDV5dz3r2954HGMeLEhUAI35Jy7xkXj3GjvPk
N6Vf6QwANfminiyDaEd6nsd55tiiNm0APjW8EXbBmMYtB5lv3KeS0P0af9OG1VCpX82bOz/XQxl5sndM

Cherelle+uOcuSlcx6j6LPuwYhKoEDX0g1VoHH/i6M2PhQ1Ho265IXVdcN/1ZQw0v8jtJC0Dk4FJGLdbVakSY

bf8xYOfnf0Bo9XG78YRA+14f+xO+1AA5sQmBaonF36
e+g8iEFAbWuwqL6zT4wFJzhjnDk3jnAM7W5w/vRUJ1tMf9nR/ie+SlN5JiWHEYVxrkaRguGLGfFehM1s

1G/ziXJktFLWDLfHFB2z94o0VMqcnojtsddqhnYKA4r1JALXhOUgCfKRBqjqEo6kG1Mla7rps/1RFaoE

DawRS7R3nqSvwXMh3441m4JMm0g7hcjhr11y6J1/4t
G8F6/H1p4UZr+4DLL1i5nOY5Ts3GxHGbt08S//diSTGflY8H6UgAackpaOxxyOu7fzRc5MgDQv0v0a0r

TCJEC8PE4bKOn/EC3cuxxs0En4AMuR3IdoZLkgniG7L1LBQPLjXEkTP5mXRAL4JXhKDc78XogQ/ChbuR

GJF+n/4n+3EH6ctm+5tY7q9D8wmjvwbMf1dDT6C4V8
HRtpkFaXNrLcyCjWsnpEt+AW1DVXxu2UJ06mC1TtSaffOx1g7vxiLfrL0yKP6NBCqt2rGgMkHmEBSWh9

o67NqMmODjVCDHU52zZEyw7us5b9DueOcgV/eUmuk3UirfdfzV4xfBraFYySQifpSx6C9o6vb62qxQY6

xkuEK0Khs7QSUGuvnBRy6ah3PMPwIqbxWooI7vzpCH
8l1UuTT8Hy7CZv2bCt/FlNC0xoS77EqnsoN4TfFcwD6JfOlvHg9UMZSBnLXLtUMAufbamRypteXnv3lk

7fPwGuZdWeytXEZiOkuggszS0Muzc1j6OB7v6qLbkQvZ1KL9vx0hXdvnzXktuoJDoyMh2glK1IiQf+w1

3rHUi9SMhGMhYAkUg/qCyHYuf5JGpWNqEss5hXDR4y
Kibr/ZwhKt9tQypP75Jl2U0qp8cqeTx+Vfx4Q5zuFFHjel82CX5UHm/YPbcQ+m4AjgRXaftN049QTfu3

UNvKU/V3wscSlZCh+moez/IvNts47snIadELdz/+MaVbDbzgQ3OAe8iXuAMz7KjIe8m7wyysyH4JWOl7

NHaa2WjvIPyzXpVzn60R2EeqZVZ0YQVbMoqQTMpvR5
EVr8+4+0L5BLTxPe2CKw8NV0AIVw2HYoPw9vg00g4TVYdEVUk362I8o5bwhJ1oPtVQBuCDbyhRwbfWTd

PYX/QnI5D7EzDFRDi9ryFA3zAjXzp0r8y+Re3oSeq8R777AEdgyhrb3mKQQ0YpF388z/ggfwXIJOIQ0t

fcLM11b1D4R3VidMgzRr1TnRoBcIquI0bquQB4+ZfO
qm52Fxr72gssWuU0goPgXW8oFOrR/1Rpirm7wvxQV3NNmStup0mTASPpKtxRatV+aF7HAiSJOImiGubY

ALPzSu0iIrfONofgyPTWRhe3SEP3NT3rzn7AxVMui/xNvqNT/OCv35Dssi2EjSOhbymZ5LeBH8VJtzdq

3oeldZFc3QxEbBeyfpAmGHpUicQwX55BwMdmkeiern
KtD9m6+LgWGeuvZMnwlIlguHsAsJV4VnaS2WIADEy4M2wZ15maExETBXlqIgJV5NV9/UevNf54kyuD7X

wfK1KfvXXb4AdA2BMRXvuRcJqPjs4DTvjNdLXNmyYX4zIZnN5IHyM0WB2m8ID42typFvu5QUvQBHXGue

f+9a9+0+7oed3Xa/1m+tc/pBj18jex23rwecMXPOL6
sKfB3qlozDYWzGPttZFgq6EfiYR1GwsuDBD+MuaY2/3w1puR1W+GetTpyZymYJZDNDH7LH0+9zVVqlXr

B3OZkY68j82vSi+n73c1T4qGGKmqbXXy7V4p196LJt7cxjqg4zFweY+pSWdyD5+hrdiDzDIN3k6WCyT0

0f7DsEt98Rzxf7MNjoO6soLg2xjfH5ERPy7SkR+8wz
Ji5CbXLmWW8IdsqwVMgWfNpgl06eSz3hGgb6SJ/3jR7IhtVNMhv3gxRwneEeHAc68Ki31+a+bJuhNGib

rWiO6HtrAOTuhImCzBessokjphjokqYFIU25sPEjD9Q3PS+BEwy96JRjCvld3SjNZ166k7LfcRMVrKpE

EhzjVCen2Y3L9y7vQSPw8UE21BRiBCv0J+KTx0rIm9
eADZD4gWHA3EvIY8g5WhSsc+CCIJuk1/FdiYWNd7kV42uEOIKiE7+sgIDgcYa/UZOq/f97UPi/IrBTZ+

4s4LImQ7kkPD64MxbNoL7KmUN580nmIDTy0FVQuHi5PvGilAZmOwcuMXTvI5Va15OjHc3z+uX/SIDIAj

m0AsNC6lwL19+K5uRoTLciKsGVJCCmq1ZP7MqFEYL4
QS8maYNYj0Thc8ZDPb7wR+1EBKeN60sG1QsRbF+0MVTfw2Z35+x9baSNKqnU58ZqFA5uGZ9lP4pHI/hc

0vi14qjQByxd3BltOV3GSJikFMrvSffF12Ov3Ja1aB8cPItKIC6lsycntoffK9jM4kAA8Dd39PrnCKL4

4+sJQo8kploIDeEpBF/mz/HbHAWmltCQ5uhFiVzw5t
NU4ZyzKcTxC2pOgWxa1ISLLErBwrreznEv7V0LkybTb9Lh0e0kexpwlq0lxnkRngr6ZAdX4Tvit8f88H

Gb3w3fDqGY73+ABC68zYI5jYfwXNrLvzLcpaW9w6XpSy1Rb7e+GR5OPGnk+5ni+JTbC+FEIrK73Kfz8O

pxut/aQWQtbQv7X80w9MWA18D65qptDf59C3Hzs83b
pvG+2L8P2Dv4PazJ32FaymYFRLP/R/wjrAHD7ytAvQtOl9tGvDhU9ZIWiAJiImoA3Nk8xqknBMvLmb6M

xRviNnI/EX+H/gDCT5HOOExEKaQzOVbt7tugC5pgvdU9huBncIb182L4aEgFaxVd3ouh3r/etjYTwddt

jbhVdimGhZNr7bL6bVvs6qctksHrb16tGKj5G2iS4+
XgS0VZL6HyTqAfJvCrodS34KOVe0SVhvqZcNNSNzxoYbtjg4s308ArjXYlFjKZVYOJdp2RC0i4FOjMVo

jewo+QpTtCiuzVoEf1fVh/cfISI8jXaeSJst/edZc4MDA4W0Vd+Va+yh4ccKrMCQjln72a4Oxt1+P3l1

QdTkcDX8LFcVmoKIb20bB1cHtNfY9n1w0Ioybycduq
aGisODu4xwEOlQ3sXsWzC3BEkn0J6O+g7iPOcCiYHhO9NRcC7wm+irIzkDFKD0rMbqNCz1VEAplLXrIR

RrH3krDP5BbRH9CJF2ItHmaQf6jyTqusxK7TbrMss8hufGkdo1LWTk4RAMdK3M4BUPz+6jaQLlktHtTI

SPUMDr3qdjSf8E/cSZXeKdt2EJk/tSwuY1csZZfhTt
8NXWAFk8XMAkGV+H6KxX+r0AbjlwZ/CrRu74uuZrfnARnGvRKKnDhC6XVfE/X6dM56n5BL3e5Jjlj7BQ

vb79r/vSCWTEk4mA04Y5moJ5mz2eivH1ZTowxEdxm07CgCaQphdDtRL0VAaLHEmrs5nVDEVu8EAGC5Og

JqvxNK5Ep/4PgMIwNhsT+wqhfhVkyRL2BeZctXmqfw
bTQTQ/6fIvtg4z56vZa5NqpKnwJ5ZNxsAnGZJFvRus2ic25a1HuheuiFm4Oc/EI7T24sM1xzz7JE4fCr

AcsXnK1xc3j5xd4X30AMRcC7fw3NtjV45fUJzEmTG4Q/ALecP+FREewjvMgjAVbF+M25ZsFKmOrpqSnd

7vJ++v4Ud31anbEI45XXW4P0II1R9iX9hnbpjgnlpk
amS/9qSsLyijZhdtnl107ltHRhPfSH3gIaQCkGS3gIXneV2QjiZApcMp9/P892WLMviF71zXUrtKtKYx

CXC+zJr6punOyQMtpTOtYQBlzq7et2R2sJyWVa/klW0iUZpdkqx/7mDFbWGTVP2ciBsJ0CY/7cFDemvL

etFNoLOM+B5e+YLhiPpoDhw9UAEKF3U5Ku0TtVGeng
wlGuT9wlnDAhS//3enm5wLJm85C96FWABG1YR5apPctIcCS5zYFltRig5uTCaHX5Ia22/AUkA5/fKcz6

FESxeGb6dHv9Y8abQNmBWfFUd1EDTb4VsUhbSq3cckk3nQVKwS+UIRBs2zKdh5FtS0/T4IuOyFSdNp7s

l1QoV+P2N+kOVSepWalyCa840KMO8OJATfhPsEDjBr
lglvSpS8uG3nVulzNG64Iukcew0vqh44ZN6qFPyAqaefQrj+GoBr+sr2KM1EfNvo2ZClmazgp8NOlyi3

iaTmP5ZrIRnf4mMabL+23JGwa/HcsTT8BfM0uWThtLjKwvH2aMoTSH+BwQCkfitVIbrGMiPX53XykQ3g

7Ikp7wyOdgBTxsQanTSvmB9MXl1dOmrM/aYFh7iBNt
DoMnJI41SF8NblVvZ5BE9IdDGfUJSeeHJ8ScPcCCOxZ0AN+vV9UC+oOGqjlnFsN5fA3MbkzWqIr2L9VX

sfZu1iFq8yvsBTza+giD46sVyoNF+8gXHrNI93nAq65gW2MMmxwzKNaI1obNGsT841gxpAnOrttgB8KS

14CM8tpZXnETQHjpL5XLevabJjHbt82PadYS6bBeY0
Pb3qsQy64LfKprjsup17wxBqnIa1EcsTDIxGn2Y7f0/dTIGat1oEh481kM7Zcuqg2QTNdGEG9wtIc6SR

SrJOQ3aKltWT1xCmuhJhkA4ih8Ab5CsbwCJeuEYcnU8okdIPqU5OKp78ITy+/R03UogaysPkl/W8iUfO

oa/WY8b8OfjZwZPICtMIRkHRSCaPVntADf6/DBjQPs
qY06nIL4fZK2aaXVK64AgbVhe7N7Sst7grtEwfZB0T7tBjoeJ1A0KTfTVIB8G09b/Pkc+fkcJGRf9tKb

xisf+R0K9zyB+bkIb5rXGibN5inDATshWsikHcepMMhefM4Cytx8IyNtCB20i7fKt6iLdIna9zf6FqoK

wJmMG+l8OqRhrDmiUccxtud36htAqCC7n8sb7J6Cv/
8V5TH138YLnvtkDslA8yKIMbhSNMkN/kTLPATDvNdgRah04EnoML31bVpyXlS7SjHOlHDceuerwiF6Qi

g+tiVGVz3m6Qzc7Kn1vpIjUA3DOhYDugi/my42lrW4f/H0kJgtl3DevIA2Qtl8AiRn4GgivL1oi9HOR4

HzSOqshPIIIP2rIpp0iGEV974oXIG1Xi5wGyluC9Cd
3KbbYBM3H/9O3oGvDvd7C0mqJRw2MHMolffrKE8flJZBIMMzt8qJyPxxYC7dv4kyjc5XZ9AgcdE3pRR7

upaC+s1GyGBtihLpbyFn2NHAXwgW7i6YFCYY055+Y83OhHs02z7LI4MtQrVfrVDN31/IPGCGJRd3VIvx

NTXpk+p7Wn7p6WwJrsqXPew9gVr8hFF3dkpWDTsn+i
LKKqwICUYgANRfGtvwcWcwExcXa4Lsy8WZEt/uwJcJFAxFFN4BwguFvdV9uWlvAEt5zOFFPhU5/vwwgB

dZ9Rpnv32McVes7h+ShUFlBeHCA0dq63hozsdkm+Ofw7BXewWiGLC0hgmCX/HRXUC2G2Ow8pE+2eQWSb

Z9us7J/pSc+JcKlC4h++plcI4/XXVdksWeKum6U9mJ
pACt+x/XhG/n/S4Gtjyw/800t/c/XwBa5093bIgjMbyj1yTQ539mZCCyOEAogaBDZhU6vfV+BwVKtg21

zS3xg82tgDuyjjNEvncTyiUXiJ7WLbLa2FbpwnYTylq782ckY9F27CscothElEo3unFvQafkW46fb8oA

0H75koglzwRje4Xva8+g8NWZGydgl+Gk1tkKLELeAg
S5uFM6aGd7unkb2OMEasKNsKpQcBYI2NKhW58o73zXUNHO0X2S79TYQOye/SlKQXInvsP9/p/pH5p0Rx

UdAIIKgptPKceGX12t/fXg9Eu1Ajk9CcG8wOvWgZ05E8SP2Umg7V46u8e+wrDRZqNcTgFXB1ITAajWxD

uc5/FAlhPSebf3ffd+62Nb1OJR2qc0BseWy2772NfF
YiYY5XNTO/b892ddetTHpoEz9giCsT4JTieZI+7VXYRJjqtGIo6yEe/j7+OahckDxHzTxJGPUhWkeV0T

ccvxrj3JTTHO8VGWLxbL3dlmj5l33+NvD23/w1XPdn916jpJg9q5X2YxIIzBXKV+vvcOcJ/rmLS0v852

luYG8WDpw3ZT15p+TZGf7GnL+I4PtRNGbMR1nk2XK+
iR9rbf/IuRSq1yVkmjoe6ZmGVFUREGA0hSw8fQDWk/Sk0TGabK9Mnf1FIHomkxFt1hJoDJwTLHxLVZJa

mEtlBcQ3KxWZrhWO6gkswItz3VnriJlkq6vsOcrJb8ZFL+m5Q3FS0ukxL0RI3eXzNaf7v1OsU6C1m5S3

erMSqVnnboOTUlIOeJWNotXuF8ad86pNbHCW2nP2Ll
YAv5ZZEENBeGlqyv5IFpb4nCw6/Jp4RSeLylijIcPPTGEO6wm3RjxUCtdSMAuC9B+ngjD0lG76+RIzlS

rhztjbLRfHifjFXeeJgplXLExygublL0NMYZLjJCJmf8mKinugl+NCRFL77aJRCBO/WCh5aoHw3cJV58

4X3EgHWKDj2dPsPU+7EM3xcpgbkwq+R7UXkXEPHnXi
Vzx90amRO4kxWGunmKAn+mjvUOXvC8qeyc4ol8owmjDqNQ6z9HNL0hO5h07plvhauV8i+xugNOEw9S2k

dJsP7od/qA4GzrYkdAHLv1nHgUHIDzm41s/tCOuvWqTFbHoY/rYdyMGH47lK8X0L7q5sJ9fFXjIBxrMZ

rRhf5gFF4iYodlD8o7il/4rmAJJ8fx/vpJYrOV4UoS
bUahiVJaXnI6L04UzwLk0peve8karaLyu0i8oqzpEG3adEGcRVxk4UAqtrQLbW0FbumzMCRlMm8Pt9le

xrxZxdABdCA8Dt+ztbkVlWVSQ6TM3/SH9F5FP/aKss8BcJz+dlmZr7cpuN+yFlK0JjVuNn6mP9KE/bPa

fIX25/jZLbHp3vKYJIqKi2EUUzr7c4Ctsd/DFZR14U
xIHsEQvr/DG4zEjSgZUn4fD7v56rzoec4cnjsxxjhhf0bcLQduzI1m45zOwbD9ukm1jBIVWsNe2a6Ap+

Gl2dgqBrxiw5Bjpw98jBIuoEa5VDJaBKWJxtBuw8sifgATa9e5yt3zF5TZZa2o8XhobWrobTJqS16NLy

sOdAISeYd0DBvooHvJ60mc1SrfmKEyT7lthGlHNiOf
z/Hnrisu+H6sdOsKvb71VMH4SvHmJHkXTG+A3TVu2U4AwMHzYchSyW/QbAgMCmCR4qt+cFyebP74j+HP

mphY4X2iqy0FRyW3rS8LVg6d23s6RuuUOcTbqJqDcjtV17sCEjv25of/sBMd5YAk9RtiZaSh4pKmxtPY

uCSdXrJstpzjO2eNz93wDQDxLe4uQD32NDn+WYzF/b
BwwXjz64PJOMqMlpIF2vU3pw+uU+pneKtIfA2OjZZXJ0aMOBniOBzpkn2XPFJAH28dFh9leltKrW0J37

FYxHweWV23CGNclRM8aBWjAkb9RFNaSaGXCD2p2z0ja30F9nYhnjH/8Z7/NRIhIDPZ8/QrE2jpaf7qjr

DDbVDaioCXQTuQQ1kmtwQNyoezS7n2egRCT46WH6Vs
nLNfE9wXsF8AU8SDwlPBPYTvXaEIiv0i1mERQEiraCnyd+xxMvmSXyipcp2jG7qcn5jRO8H2qonjTjBJ

x3VIb6wWJd0h8WnRkTDOZKhspNH9W5eO3PTVFXMgLClA7eu9WZKPTQ9gn0GRMhoYgeh8kS6cVcJLKF4W

FeW+PUWVA9B1iFC7kP5BRza53Iqld3ZPsF1VDrL+rl
AA158ghRrB+WqJNZyxvTOK9synoKeaG2Jc1mZzFtvUdwhhDJnX4MWA1mRddOGFVEkG3+0ix7uloX771O

rivMZQKBUFJ2fFD7cnknJrwi8EwljuC8Lt/T+3saruU97JOFhXV2vRRRJ2aat2W3UyELm4nzwDqaI4Xe

3EX3m/4kfOD6WVfESPabBzRKyqddxO0WCcfPc+AYmd
wo3jm1fL02l7D31ksdyNCBtXdR4R2tzRO4tvDXtv0MaUpgIhNqBFjW85XEqojsvTziFmeyhJjrCD0/Ee

ysll8w84jDbwgINeswxneIqe2gDMB9WhuwsBm2CCLXk6tG31sOJ/WqE9Xc25Ws9Sa8xS/mrHaqAGAW1p

63ynnGdhT+O6dTSlEfu5NvhGSkFMJ8pEqOsgaHuWRA
9iJquhUmBUngWKdL6gaTZO6ysXVbQQs7sXUFgSIJFwUEjc3Wt/z7WE6zCGZvYmiOIsmpoEhUsdqF02QD

HzvbC1nAScWP5cu/ifgLu3XWvrw3Q1XuaL5jzOKmefn2t6SQ6pAnkxWB7aqoBInfWlBnxKdSCzqtL+/X

xHNzhVgd7KO+E+c4GZd20p9eUyIggmBQWNImjVvysg
2uS82ng7uJ2nTApbCDK8WwP1iDBP1hBtyME140q93ql7YsgcJx4ORYHBF7gRCCEDepy2xMHsktvpgYLK

w8zfC6P6q6awOTZMjp2GPKyOW+5IdBkdZe9y3B5pvaXfoapt7wvAgo48qY9AOeDwhh8jqqTj97on5/G5

vB4fgjpGKEtGec8Et4Qsd5GsSJZBW4szLX28BxNnwj
0djmRGsCPcLTXkNUx2BPQPxzbWjb7iSRd6mcp3o+Lxa8DIm7LS1tNvx/7AvU7mcr+6z28Uvr/arNgsY8

mL4Pl3O9fai1HDGys+6woLKDmGiNYtOVre9Dmn17hW6g55J34XaDVQ7OJMXNO+BBDgE6IyLxu/YbFjC0

fExZiCkOtYrZuoCwlmlkSwNYZp530LJLm9kqc1h/Po
CkyyCDzl6HVyRxWARAPtlKqX6DJ8ZBYZ3V8YdeX/jnnzP6VG+pW8E4dO4A6PP66fUzRS31+AWuTTG0xV

mHq97oecMc5OsXaUCYnBcFDj6el0UexJeje379JFJRtRg5DE3lijGeVIjwb8fZbu6zLJc3XZHPfKbAZp

j71zUJG0OufB1/CZyt4eSoJnVI5ol6ta8jStBzT1ir
bPny5Crb1A/zbd1zGhz4brw7sUU0EFiExpYz7foMLdjNeWbUDy4KysMQ1odEei76eiqVDAU18vxsgdt8

iE7Nl0G5jJeF0ieP3sYvBuqkMuzPKLMnXaXQ8/JdQrkaaiJYtfgJwZhkluGkgBTonpUzyhsjsCsHh9fq

i7TstxNWZb8zjQV3NqNsq2lA3Ths66tG2b2AK6EnSH
gFbXdHCrWQJ1D+XzG2mbXCkdKJYr/dwpg3Oua2uzgKMjM4C6HzBd6y4LtyQWDYi1pH6ygv8AghzfdlSi

Z+a5FrIVULo0JNG1uivKyWQhzPxWdv1OeZ5dAIuyh9niAbFS9eFnngJvHGcH+1AL9a73wp456s2D1Opc

bVTmp8VL0rfmlr/CA/sGB9oFDAQ0HuL88Y8AEAvm/d
p+d+uc5WwM7l/Nf/PP8zCERHL3m9BPJMyqVvYaECDszxO8UbBYC0Z2nJUnh+G+HPl/Nq4HwMvzr51Tii

zAPRn5bG/emk5ruyf7faRB9Vavh5H7OxD0iaKxCk0EIV9gt+B/dKx7Hxv6Q17es4ebBJ7BMH697N0s34

nltNTcy71ZIyUNQP1y+ZZk8Yjs4j9bufacSiqFRG2i
YF4sj7IJG43XwCC/1lm7/9+hH/Zrdffzv0VVcpEL7/SQA07TsAcrp1byd+Wh7t0tiiJgbQUxiueQU89h

tr0t5XMhYl8uXr5PoyIpwmC28yOubKodUFAPnE4RveXEHbj2kTF3fD3xjJaaVoeTkTp+am1nVaa6O0Ub

eU76IadX8sUcW5zcaZkSbC1gjHsk9ZkmAl9ar/7j2+
3RIkPKSwTHO2WCHgNgkN+/vfwMLtWsO4C+SMVFFcATKPznlkPlvfjrM7HGosvTrQ18iTJeY+KVD+uQfF

Xnki0MNT9cLqwUpLtkGpmygK1g1akcAlQ2u7rMR3PGBk9brFDaTbxslwPNmIggtaALIqeJBGwndVw0W1

P3oxB/yRErgjgX2P2/S2tdEYbLkLXnU6gsAlP85ICQ
B03ODt4hsPxnFowVTDVMP9hWr6D51ph7215Lh4Kc/bTEMI50/tidhuC8cGSeTnKZ0IabAQDyxHG4FbOW

FvtTg7mj8YdT6/bWixSpXQtwaUVZE3hN1KVosa80kW1wjfz3k/Xg4RtbDrbnInojKxlTsb/VX0xeOU6P

Yiw6Oj5ZhFYsQOBfd+Zx+cEhcQVU72pdVspbMuJzoq
E1imVqaCU8ofYdZMQyFzSwBG3txjblQnvo+7mq2tduM1VJy6+9L0krcwLJjy/hsD3L5HoV1K3yXTbW0J

x8fU0Vsz22YslmoPkm23JdN2TAPssiwdO3EyHUv9jSZaRfydq/ix1PVWlN7b48tjqM/gAWVjJT2YO6so

KyiHPmMZ+7t2BZ+yC6stsCmniLDNRvqR4ZuWVafprT
di5ox+/VO2u8k/KKmaNBKenz3y4Dhs3vu1VxAfs9EKtsh6gwXDwu0/AriQP4ybsOsFe9RIumHGQwXSk+

njSStjy9OIQiJCJdfdUu/yDKjsqnQ5RSXFGBpxMZsBVO+9jEVkD0+lUpiufPNZ8Lxj7K6yxPJROgw74E

+72BPAmpmRJ6Ms2K39UoAf7ofh1Q9gfEVAKonB99Fp
MmOqEkpWz5N0tpFKxJaHT/0Qt6EIQVRXvgubHYKOcrwUeL8wEH956OqoC/5qZDnNPv1wkypJBc3gfXuR

5HmXuTB1C8wy0rnjjUyu8Gt5X1Ba0nDuD1paT6P3f4wB5KBsWz4Anz1y9I91YM/04b5r3grWF79RbvcP

loRBmps+488ca2UkEMM+ABHjuKgi+umb43Eh2gZLTk
gb4DC6r4A+iBqo5/VJVz0ghmXARWVhWtGNE9zRO1h3io4Mp+/Af8OgSd/s2ZCFwfoFHzrzHRoslj/hat

/4u8TS1PtRTLYoJfWgllEaYHSvaY37UCYv1C4rkuX3bKz6z1PqG9XvePeh6y3U353B4tBK1J/8luq4yh

bl3KxBzNmbTSNojlrAhNJfA5tHAOAOa0PLuj44J4y3
vjDUDPR6yqIGW4GxL+NUweBAnIUoWkyS3yWALR6hFARBs4IonVg07PnZMU+VE+XHDCJ0qwVFzkTCCx4n

8r0QheVreqUiGG9TrTcIKWMV+GYwp9iYzt4qgX8TApWUi6V4WIowzZ9BY3abnzaQKxBidEnCa2vk6y9V

b7fz9xuj5PJG+zGIMd+6+tMTQ9qrwefv53dTUMl4ye
Xs9SubnwZn8EuBpP54nxoS+WaA9UNX1HGGbDShwvktMII0w0BK1PIQbajLIlcNW4DOBTPMFDVwBaeBdn

4vBmEWJ+tW71wnd6mzdjEw6YO2gseK/BgWR0JXsuVkUrSwcMF+I0Ey5UfjDmeQw5DrRju9kObWFi3W/w

edU7aleJWAV+C3MKdbZAO6CvLkTeb0oRuwrLeOKkXs
5L2gX3j5F1w0SyCzexEM+cf8wMW7lp+WLVr10M14HJJXfIF6UPqYQAFcdCX7sBfoW6zgSzv482oRS1RI

7v3/ZJ7GX7S6Bo2gSPVJ/n2tESpsrl8y3tDQpySvrbxYZnM28+luUbpSngCpcLhlyA5ajxl9vnKNp0KK

o3BFZ8FSlcm7gqi5P0r75F/8AkZsBvHpcYE5oBkK3v
CivNQNENdCLL59uvYZgSq/YWYgzuZNheHCwUvPfjo2fQ/JsHf7TspbVWqFoKU7xJhl1TYhdx2xqCLlLx

kly5zbhgLlSNJBR4eupEHQoc/1IwIK3n7PeUuSxlqOmp9ncQRI0rA5K00lCKuqAmbWefa+u1TaiVlJDe

c47LQVHJY3p4p425fLp8oNOhGj4y6m39ob6tAX+WBx
lxk8NKrFvYa1Ok/VB1fnyQyJUrP27lG/hQhixl1PC5wyRrZYCxhwHOcj95pdUK2lEkoHEXpTub9LhSQJ

fwCb6vOC9P1BaSZZ9Bt5N7Nzz3AColEzfFWbH2Z+yF+/6bsodjT0+t1R4Wf9gOw3aYvsY320uGV9FrZZ

zI9IHsV1g0xcpW+XN+mKH+WPZnUvWUeJozw6Typ0bO
KWsQDGb44e8nLry+mvI5QYFvlFS6CtUUd5/Jv/D+MCRnbIHMDWUg0WxV2C7peESkyqf18qv6eTQ663s9

rITCaiPGVus3oYKDWczB5UxHp/8mDXj+XylX/Ok1Le6uD/A6zGO7fiTY90fZmaw22x1TcnRF02+Z+P4B

sHSVR+FjuHEVn0kFcobnsw2FBX1d3G18mdhbl24an1
v+XJzv8s5Ft4Nqg6bumNP649u0Hi05zK/0C8NbH5Xz/9j6/HiA4wF4wQPOfHs7qQHJDuhTO6SziY81to

seSv8vRo4x9bnT3zzBqtKTJS42jdiFK0I9nVOmgrYFI3m/dDtfYi0hG8Fbh5Y3xya/1PYjzoNAEdohUl

MKoqNLfp0rTCEbLqLpPE0oQi3JNZLeabUHxIOnlAwW
rsOojnsx1M/A/CGoq00sGVtd2yzLp3GE4nQZHC9EXk2QWkHjBaJDLIlO9SfUQcK1PrLf9tN7fWrcZm6I

Xn8+wOihtUa7bZIgKqmzghrec+MhneQ10v9CYZWtIAb8ctwpfT8/Ma0mAJZf7m1muW3AGL8IPXr8Dowv

VThwqzYPR5gEHHp5txKKvfuqg3+QZTWvW+QRGXckwC
nJaoctMjWQK6qeB0bHvmkRmJTF8OY2RsnbJgvk5k1Lg112ivP6QT70EIekzgSmZ+2nd1wJ/O6uSoVjcW

ujIrMk1JpCJn7CeDDRrhtULlNMjQ0Ui/13cTDhjoeu/s1vUrYDLH2Zh/5M8rll2Wnw6+KqQ5/Ufx1R4e

oY2Jahwy6nL4d+Ej5ND+FX/FHpSLZrZOd+by5RY/fj
LQdDA0NDL6IywEtgSBEhKbiHaVPWMbsbQ1UcZwB8AJAcZ+SEzZOacxv+1m0rRBeHmGMIpVN6jkxIuLvA

wqK0LaEBLeaUy5QFxkmCbu/nYUvV0MSlmwl5LmY5pSQOoIz3RT2lw97uu8iqWhnstbc2O85zG1VGwun4

UokurRqIEPdvlanW9kRy/17ZFLcsI66JtY7UtXJ5Eb
As4YXyuIgeydyD2W/s16C8f6RyowfSdrM5L0Q/sFsok+aJ3L8PCAIPn3lcKIEx2wVRsvstL0ou6LtwhG

dRq965bUiOpf9GbtwQksfLDMDi9rp80fDYP3xY1lU+uJY5nc+OSJi4BriDx8SeWGwPz2fJ/xT77ujZkG

ehifNIcv5lvVKFL8jz3/JJT4YYS4hYnwXpYai2YBhz
hAVhw3PeQZkG0VrxUsTpkfs1JQJUG2VQEaQ9cBl0XFLZ9kB1NncgrY//02pvPPjS0XeSF80taqPyf1+a

zsW0UAvQQcl6G79RcdFC1Rwt/9VhXLVmx/PkZfz86sTE0n/XU9qy27t8IlmAv62/F4KWiqfRgjLxDzxx

tISckmMq83+TPSlsh+Uvi2UdPUCUXvPOcgeLJ7j+Vf
pOdRy08W+2z+cL6Nehz/WGl7FvsrN2SlLFflM742VECdaVCyAmH3R5iSSHZrUPIlMjOZaEmy3ZQZFRaB

bM7EeQWppoAhc6vL+swDqNU4i37QQpj2kn2W0Gg6uJFh2BCLA/FDUTl0MHjbfPHQog6kBgfx6z6vJfY8

C/8MNJgIE3snz8IMh8xu1Npo9etIRpkEFhmbJElSd+
wwlFy4hVC8XBZeqY7D61PMOImI3pMNvoRf0LHSUW/mcI5BFuJi9KbOtp3zT7uG69Iu7QxaxCcl8gEoTM

IONEYOsyoeI8aHWaKLJGOUuLsAnXKshv8I7eoNAK+cdZ2lJqWeb/tnb9IYyFdJnCPI2Xo8VpUrAI38As

J+c1STysFRYcEd8w9wl8nyxjcj/383AnicFfRbvELn
HUaQPSmbV/NSKXonjwNFvKrDN8/8gO486zBaYrKM1QTPL5YAWdBMhHKShrPRZVRNIPQM9qkQ/YVhUTv7

1PzJuftknms6SEcJGwFnKjQEchf6vkar8HY6ivJN/MLa+VoaX2PHLfrr13HliBD0lVjAi5kLEG0Pbrn4

LPl/vmQRxJRJ/R3WEcG6SDBjY9I8XktkZQd1336j58
vl4r5DjKObbkRHbtnL/tQnvoBhkTxR1zDI5BKpCOcYini99cE5YGm9CO4vqsTismwzmR177eWYWvUKfI

j7NBSfUnLt0dKYACr/pn9O/rGkyawKJTuoA10nCTxDOR/jNEEO1tT35doZuExtbwXtclCA+Kyurs3HLY

W7AtWjJmV8ZBKIRaGB5iG00w+mYcSr7r5WbWvc5r6J
LVOu648vo0oTRNrEBrLCRu8aO0108hXqNJt5k4ZqJmCvpyhQg+OfuZ50yRRc+C4HmnSa8JA49VCN/KZx

qhJsfuT9zMfD8lefhr2zvaHpN6EVPsdBp+Et8QZfahraYxoYwdwbHdndli4mDrIKNZuCRDnNBgw3pGGZ

OudhnvlTJLiOe/M37+qTkk10LIn0FBwsZ6Y0quYu/i
BRKB+f10/BPJyIBi9490KgFpDWE+kKiML+yASR2wNQVz+w95SQGpSnH445CJyOIbeXY/8uXFmvl6BQFM

am1+EpNtBbndMK1kdsH2EHZBfdmutFPQ1nKJByVfluVOUL1Z8pP+UueJzy1l2Aci7Yl83lrL678JcUh2

gymV9khvODlmJ+Dc/SQyHjmTQ9OSV9FV0x/leFDEma
LmQCaHtfsrzGzBv3hUfR/h2Wa9Tnb3cAR5Ln9cJ6ylLmIpUeVcxMrYegzrdv0mTZhkDvWTTkLnXl8YJ4

l985ULrKE4dbnBWtInbPw7LZd0FxzS/aoCR9YzSTxLXWEuuMeMeA+LKxSIsXfh6RTFdAK6rLsUi3L/Qd

ronyPE3qEQllAhXdUlqS/W4LC0qA4wFN/hvjZw8Xwj
X21HlTRnboD23U/VQLk+Xfqst+XQqKzOt1jM7d4F5D9ixHgW9XF+GPU3Bfxtfst/THr67Qd1Q4fwwAvY

rEkpfN4nbynHK1/qZDsJJ5XEgvDAsYckU0JjMKF6F2ZnVuCbyVpgbFwz5xcvbmNv5ULjebMeHvLF9AY3

Dumont+/Paul/Kp3dsx6sD81uW2ZmaOTRQh7NbENZGaGG0
oV3hZBezUqIKqeFQhB4DuLzw2DSVUEEcBjBJA+SHOB0YCD6C8nO0N1ePcfvOxoDpnHEsDcpWL4RSPAoD

a/8WWoZN9AzFTLjWE4PO/2l2FbifJ22s9Ef/parBSBHs8AG8wR/vAOafY+yyD6AjkFmSOvc/gpYbrdNQ

2Zf2wG2qxwKnJwa8ortIZ13I6w+A1xOH1HqX+MKu6D
GTxnDhtssMoOnE6ISE/Lii9UxY+03uI/Wadam5eDle7cEvhLI8QaPloBIJtEsgUQNnqu4CAc0qNeXl2l

++lHyYnm6GQ/pMyJJlCiptZ4FaO2Wq6rBW8vi28nC0Te0830mxWEiVP9Rb18F6UtiGLcahFDkyJxSiEa

jm9LD+4FOBmLE1K/WdeXi3geANBViVYz+yUYH6XXhH
dQ9CsfpeSQfg++Y6dzbkKpuROUZKLqfLPF/ofidfGpzls55Q899gRntqgo13+jveDZE55ljvsa/lQIPb

u26HxbXhNxFbxiq9jfUEElM/tptDrZyOxh+eJiMeDS6MizqmUgkJnzm8MC9etN9ENkJj3AZiBBHQ4/X/

emYNIM3ye2AiaV9GI6aQZ68oVwMPGR2sq8sluQOrNi
ZTHQzsjPZQoYzd56cmuP7U8fs2fz2my39x5uuVXO2qBF3DROqxOIWJZBiK75hxqeTABSMPlu05I/uCaT

MV4G7kqIE0lTMg7o2vBMxTUjy6WtoWs7FjRelehoTCV89gNdKAQcE6Qdbvmnl4LL+j2sOaU91Y1x81ed

+oSf1YXhKf2cYZhF0EaiiDXNKxgEvCljchu4XyXiNE
wUZUkqtcN+OfC7TS1PS14tUKMQV/Pe6o2wK4rsr51lzjtsTYrswY3SnmoLNgU9MRqTlBH/tk4CoUwa+Z

fkfiKf2ePO9lc7z0BlAZgRxoRHux9z3O5KD4K76Wqz9XDew4BoQa2XiS9f+QOAALufv+30adYFo0zOSV

QuOXnRBB5NjqttfNzGI5a3zc/s7mG4IBhIivWN4T4w
k0KPlemlzIX70RD5gynBy/nikauC8yZFWrkcRCcYQ7maX3DcM8EKJV5of6DPvapLIjK6Wt3RuuSCFYDt

vS/ZBPR8ewhp9FPkxnj9yjTNmWwjHbf3rxJ4hVy+YUTK8m44fI7ZlfgUv+2XXxEv8P+AuKpLDifeUyxY

mpr7dt+1NIf9zHu7UvafmACcFxCnhAU2d9TaKvclCr
FuRE1wavfQZWFBcV5ANh1it8gPK6oMO2HWzku0qHGQqn5V8suUpcWgA0l7nZRdCp/F8gli+fC0q+0eAC

+ocylPStswwBpVl+BcIvqbv9vxJm+tjGmOw5JGMgf48x5iQjibtiSKZ1uob5k7CYFdRWsFiDQKicWl3r

VkE4Jn4c8g0i/FjFRog/GD87Gh8ilSzv/J/pww9VYV
Ge1B4wqE3O3+5oDozJTYmfQSW2Gx/0oQLoXZtSWEoj9CH+OBBZwEjrXAs0XyC5rKd2nsnCyFNFlF0go6

qi556M7oDMRT1/K5g2waO5IddTsK0I0cFAPMZvNnBFng0TSREU3z5GyTBrl7gboHoOzgUoB+4o7NZ8te

SMHNxqhpSco6aOD/19XJznx1SJrhNYia/U6gXRVRYj
9Ofw7QCDjyMOB77L5CJM4xqY2JGDMLkbG1fuDWFVG6k+JzrW1DwySPpYvSEpE4XdbMRbygy/HN1Kf7T2

FnT7m1lxfADNHnVT9qdHu1PsBNWVQXft+F0ptoFbTf7ST/Q9S5wnjEQhFbuQBG8BmxqdQ1Ofjh5Yuf1y

eidQbhMql/Q3sO7eiew+HRDKfEtD6rV217JDxLr/Vr
Tfy1uykJPfXbP/RQM4yL4HSRikrDqjE9gT86SUv42P3tnBcGNbk+U7DJHrDi1ahsdGhf7+WPPpNRRGbr

8fp9kBE7tnjXg6N1bm1yaP8hvVqU1WM4pNTpc7np/koqKI4sogYcjx6aS/P62iyg73qnbsC0eLV5ja4+

ZNSHMNEddi5UBYe611M2XsDGwZMLJnPeXx1lbRlGQV
2cV3Y5aoUcN9g9mIExxOEjkYff5LF5yO7R96KsTh8mT5mnclu5cLLkSjFqq0Q1kMYjaaCES3KSYbf0CC

Freeman/JkVkUBHHQZf3o47UAk/orHlxls5rjZd0YvCh0977W3/WDawV6mJV7I4dh0nexIXqsEE09XgalxKq

smIZGeLS30DEnFlwb/m4jnBZ6+O+OIyz5CC8ajTqUg
+bAy6fxS6Y4Wc+S/Fy3t14wIYJHpJOhWp6mrT7Uz7cE2XFpXxlTNHDFNHfYkyGQxraBsBV981TY7TndJ

pMZGIIDX2VdZktMwBz/2AxT0VZJ5mZfQaF5gP/Bb0BbBclRuZwgzBnP0gljf7L1gspNHrPcB2EHfabtf

xM+9TAb1MLavPUNYu0W5jhyJMHfQ7vvojInYRU+ov/
oGL+/Zk0thx1kx7H9qsbTXoAr1rt+XnPEdQYaKCPI3Urop07/nSdNa+eextAwIE1bt6SRCklfhrp5/qH

tT4t/qSJN//qhqOU1817+TtLlBMhU6AvVxjG9aY6t2/47URwjbu4r109g8LHc46oTXK6UXIsXAvExiO3

zl4UTzMBJ0urI5NDmbS3gAqaaKSSAY7KqROaKfXGi3
Xsp8Z2IXUrxxWIEDpHKt1EG1AhvYwjSUJXObGL09+WENVlc327tHRnz1smVocTvKGec38zjyUYQgYB54

QUXcGSt51us6s4ZlnMoP+2P6kqBAi9mqcdx5ows0yjtdlOQvBE33cWZ0hASt1Ra1ObVwgzW9wc526rRr

7YNRVnN9D350HpXUZyQY75fUckKT+zvrT8/ZFYalpy
TZj+AAV9qe1wRgwC1na4b/mKG6jtr0VrFRbTCmRhwAhFWHTx8/MYkBkWNO+/k1vczUjENW/D9B4VzTg8

4kKnZWfCkE1chsKhTVYiewymOu5xtNO3v41u4SsqTJ5XMjY+0Ohb0RcG+BNYmO7I+8prsW8Wo6IOhEG5

DvMMFOUHwG5vDkahMnBzfaqrDL4p04OTCfY5w9mv4X
k6W6ofC/awXVZeYCY6eBLbDRDaVfllN+2K18VVA9DW2ax+8K/ACqxwJp5UMzxvChBux2yVA380+HNEkU

QtzGGFqwUtmUqA47WoCns6dJJ8zygQGMPRrzspid0qymNID3RGC7Q25maT1rS3Wpd5Uc3705TD7i4LoQ

4X22H5ctVlvyueOBbvNAOVg56l6fWghcsIXlgdQ9Gm
7yonisGuZOhvfkvCtrZCHTJMwXXvB8DXGWcKxF1GsVnbM9t+cyLGprnkTROOHR3Ky3tckpVJBmBNGFnf

1TrlBOlLcWE85GpP4v6cm12hmRjMN1m/hfseCL75RqUZG2n/KmX8tLY83jdzRkYiRZymcEKDoOPRejXX

CPXYEre9lj1oVIKsCz3PHW3X5uUkNsA1naOxgudNKm
GEpF+ze1xd+Tg+32PNMKj8HZMLkNrj0pFJ03SWP36TktYgjVgaOneKtH4cD8TpRIR/zoNQjwXa3XOa4J

z3XxaXune8g/dkiC7AdlnDKo51wTvadaAJHMZLKBN8meqMJnWibVDodYUWVfwGwEMsdsQjMgkiA/Pa0r

pPb+y5/HEp4LcH1qvOO9Fuf5kq+5HASDl0ZZLXiNmu
Ymo1/5ZCYer/l0F9liFBfypEZsiVgJg78w22Bhk+Jl4Ip/977mdvbSa9DxyAZkBxAYLmETxPsG+XTBYT

WnkM6+fYCML3KH/Ocnbo/AoZyJ1UROGgV5I69zPko7XVcDXkmsiMjfh9fomeJl804+gxIHYYN4Szv5gl

W9gGZ7eJOOcMUhab2WLOLKfa0H0U+FrTUcqLNYjw3+
6qPvmORokhQH1Ipugs/cSvSemkf5FdaGq6a+qjFnbkbIxupDRj4sWD6ZvgP7Jl2el4TmXd1TYNCvfCd2

R1wT4U4dEkpoKVGVRk0Ik1OYxOQc5usZ+LZs4yYXi6QRTiXoXyuKXx0HQ50rrWbfkm/NtZGZGicgZYy1

o7dinjjrN0lZfAjLHK1+ETZjY6SFwCP6J+3h86gFIu
uGOyquwA1ruL7t+POQuISiftcfaeFQpffXIXLIFGGmhtnFHKjuutOWeezO3YgRXAaxdvRr6u4xsVkclg

t4aPabr5L1UosL7SEL/9K98jmQ8vwjeK/KxVeYE6yXHeKRRcLSe511VcIO3BtoAhd+GYFUvx2c6yZFUM

ENNiVAZx6mL7Gl8R3LkyB2/Ojeiy6QGLSJYlJl/dOO
aDUq2c3WUOZA+Tr3Tml80F7lpd5MXM+a9IeAEsiqi2//OEuX/AhLnrAIjjIyLLY0lb5MSp2ZLqOPg/i8

4BPNIDRraTlAdDl58MNEzs6K/RQrvF1zkzeG5orpLG2/bdo6AxGN01DbzKYUXdd6KodngwEvi3e0phks

uxvx1FkpyArvVB8gSqZi3qSGoftk07kzOwWNzD48eZ
2zthvq0iFQWGLbwmTq28tQUU6UKfiDUP1JXhQ60meWlqXSK28gQ9Eio1TjivJxV09newl89ak2ypV3u0

CqPIFXybt704/eO4kxfEk0YwmZE/ZIR7G1hMWloerpSc/PlVtzpk/eCcyrUHFNnultphOXGiLkn/FD/P

g/QxD6TXB5Qu5ySR1PJo0PweSEMhhVsV9nORBIzXmW
EpALqxu4rmX19f+UiWnqWrB+6HSdbDzYwQNXABsUTJ5Uxoi6E5ESitPxVtpQK91wj/JoOeNP9TJor9JW

tddNB6szDKCvFGp0adsuGUr7KJbZCLR/QiOim62EM7ChPdSSDnrPy6MmYzaGArUtC3mDFvSULXGuTASn

ut59ragCDXFt9a/w65Uu3NVWZotto/s41GfpTFHkeG
U6Xc0BlKwq2//7hPQOTQuexRSCkNQbHQZ7bO0CDCya/gkbJdAzGq+iOOAe8DcEmQVPkiz5SwW0dc3gFl

Ze0Skobj4JvhGqRmVQ7WwmwM1N9HMXpf4UtDBPPrHt8grvNhe0HODLtYJbpykQ6CQhBuJLZOyCLeCPBD

aETx1uRDcI/R9IEN/m64/5UqhKs36pKG1tFBTH/txv
Pe7OzHOwJRdUpe3kEcscd//t0pUHXciOyZLuPkjHkPyby3B7od68G3HiR7ZtFT5TupXy37/h5S1RtRrD

zfp1n0Xk/zA/DEuuzqO4KeUtiqZb3FUc6+7eN+x4LQ1TjmPeS1H4gRBx8h/TyGrcZl7RKx7Q+XUFfq+b

VpAr6hbFQkuK/m+/LNMr0h12hlaBWT0WC4Xf74TipD
+ADLxWPYtjuw2L/qtzKq8AmTxMGA8bW5m9TOPKQ/mEb50/utwLdQI4fAwzeBq5aTSH8OjDg5GBhEN7VS

9kDEiuta1vcSmRd3KwqpRBps82BgAg+6zHMB3oT0s2sM+UR4Ddw2RrxRhsvFAT5KI+J8fmZjGlHKpu1y

b3ERWJtjQF1VedQPiKUe+834VZ1BevVM5J2ZaNvPtd
z57UyDi7ioZUwhnnVIoCmXcWc8VsJ31eIeNaZ2g2PfLmHAL0A+qiq24D9P6TX6spMdLbnXG8JpyEoyHG

yoWzNm5LLjIMHGCprBGrzi/qGaaX3/W95ZDPbWudQ3/6UFxk3uIKVOJS8Is76EQ0KlUJq7XNe39gfQ8Z

DzQYYJT98JhDfUj2K2fwqDVK0GxjsckOmV/Xghg/Um
MZ9a2o8G+00peTR3x4kmZxYmDvngbE+al6/C1vIV+o3rr+rpACbq5b3wG3w4Dp8rfU32uqzGBOwY3YvE

b2wyz8Nv1NO+kw+N98kaU4sa7vkXB128hpX9DcxNgqM5hWPqrWgMkc+h5SIriPwauL1OF2w7hJM9C5TU

uik8wU0tBg47kscMlVPAGJkXC+sBo+NYfxUA9EMnJF
htMBMR6+hH7bSNE33/dIL0NDV9G+EkBR6Iv9448vOdORlwzV7X23qoVcy3YVUNv4/tBP8zB91+KyW3zq

hUXv6aO4C3VmZytEev/zscfwtGLE3No08TlGrRdnQ1UfqADDzKvkfVTd3rgEDq+8vIifA/0rYK14e4nK

W4FqR74LfKaHyQgizY83aALD3rn7757+7/V5VUco+W
wRZu8PI0mpeg4OZDmKJdrtHZNElS/J2/IVoAl1oDXJu5G6JAPAzNGi1MtCe0B5TxiGbs20ysrI74qyLt

usRqcn+ZwslbiAU/aymyXZEvsAmQRk5+Ob0vyhriB/NWhpG7uv/ecx/H+8tqSM/jBZdu7Gf9LOhwaEXW

tAOU5FWyd4xkhAtEmeKL+UhATaSMux8eBwR627tTOf
7GBwExjp/oOxxCAr01UWlNSYE/Zscwdu7jS1ofSsjn5rEiT3Sk6DjcgI39DRqvEAV4lpJD5p+d7iQX3x

sk0cMeIa8dSbssEMxnbTI0Sccgceca9PTTOCoCbq9KKHRjlNeMSRbDinRRwrqaKxaf8Q4VNnpJ9ZWY53

UBf2B5ET/7i0pdg9Qai4XIM12yrBmJ+ao1Ww3pn8zW
sOj8IZDrBm5wjuL8GrxFzhej3OEgBEy3fiSwtir2ebR0QwtY0OOmJirjOwDVR3PMMwlc8fep1Yl1Bhfp

+EUUT4NyzRJ6aoaz3IJ+Zh/PMcGJnmPexEqefEWxdd+FceKcijXcNL58twmDjM9ceBXhkGeo5KGus0RM

K/YWpezeoRNk3Il/pOz3MCDB7RouhPX7wo253gQaVl
5oiJWXDz5SNmo256Lx/BG0F1Vn3GN39uQVXLZ3AV7c2lAOtXXTK6liL5xUH38J91CxSPNvSFwxx0dKX2

vTZelQJpeZMXgE482iMC01ldpwrLKe0ROJke8k+Ai7J7aowIdKQfWROHdFb4sBd7umUsQ3foCh+8P/5y

uSDCnpxU7r75VTNaH18fBKufyHBvQaDalDJPWSlNLv
SA17thjQ228MvQ5o6liOAnPrhxTov4hm/LYvs6b3MRqQntu+NVnUmxcJeoPzTfE9T/37vr5TFjp1R4EA

zXw2aH+tRj8WOhXo8zF/kPrP6dbVjPlsTw28mF3WJNmyCOkwasj+2D2XIiQqZzPKxRIJdrd12kTCBZH5

ii4XO9H51qIJWoAZH7oNqx6E69hi6TptepHBV2yDEI
KHcZsi0C4TW8MAa6njQq9hhnsJWeL2843fRTZntGXAdhjf5qATtxXqe9h9fKI2ZEba0guRYjEvlP9iuc

s89nI8EbEgREivNBhlTVJ86d0vmovOaqn0V6i6k+yJxmVJXYBjBM0uD01aEIj4x/umjv9g1UD0DjcmOG

GAQqVaa1DqvTW+Rlc9SZXfbG8+y4TfVKeQhTKlbOmn
GPQ1p/opeXlEv7/vHEYiHTg8AxHYkFFe+lqf61ehfJOUBl/CKO+3BUXv1NxrtK96+eCXAL7MAuti8bJC

rq+XA0KsKTpakXfHvXCPjGwkZ9ZRxiLqebfKUYlkzWLsDVweuEnhutWV/qewhb8xachCiqqEvmRgg/Bt

sqhnrY1HavTwsCSWm3jJaFF0sYCs1JEzDtR7E0Ol0r
R6PQkkJ+n0tLD0wb7cmrEdA+TfzUA0fsdGhmpHGpAxKrmBMP+/nM3ILTMXAdwfvxuP06ya/E/9Pxifjz

vq+JZdRqvWfBW/3tmRp9NyTt8A09gbfxfQ0CBbjFlOrERH0l69ENxevBwKE7WgJPpI6olBo1eF+zzG6T

dkEeg7yxVYT3ypacDPvK5nVjULcgIN+HQ8xihQYkGn
gFtm0Xsns/u1mAaBPdADma55jzd5Ua4CUFJ/xa7z8nDxa4EfiURwb8VNbW9JDRsX9vY0fpBZKyLUBbvz

3/k1YJnLrqqnuHI60KvMAFfbXsKmCb1vDhvHfTiAT52IDxAO9QaSsRVZx4w9DVAxlCg5OA/WlcDK87wL

ukclDECXzv9kEXQwuDLWZLZUCWmqKY/oHM2Z4K6npX
Ojh3f8hRjqjJPu7pv/p7Kl4b4/ODdwJJazrc/PdfEe8j0032M9sbM8+Zgtdgh2uF+jPbP6ahruIzRkTU

Eob5yBRK4ok5mnespfvqs48JAj89mjJ2C1a4fsAw22yiM9BihsbmSngg03V5U3hPrOJruqXIQhbrpsRt

i6FXKLL/dmqU1YEOeV1btUk+NS7hfTg5hbS75wO0QS
6l9UUQ1yHrub9xJXM1K7TNasUpyJW3Ce45xUbHmAzESNXC9X1ysbGNycxtJyEN0d8zVe8UjjDBVMx+Ga

v8wpkraUjJNb6S1lQPDbnhP1HUVLgl1Zn5g73PWVEaVD8gT8CLk2HwP+mQ3UJCktiPgeDrEu4fgPF1ug

Md9ugEkXLgTZ5KwAr+bn8nojMZJwALEZcdcvUnly7O
T0iSTm8nj+FY99T9rRSgoRjNqvYdDmjat6lsAb3xcyVEQ7cZcsyF3GwXMMkcSXEFqkyVBsL2Dnm1pbbq

+s5xI5KUCQ6K9p3uoZpswl426+JpH01ubeyKd2khyxxEy+wHSFYRh2vgzd1e6cMaPnKEkNR7+avpj4pW

WSFZ1XlWZ6dooJjf+YTtlUuFazTQpADDCvd8m8FdJC
PteKdfJPShcgA7wkqgQ22VaZl4Hso4t3SA5pr1kB2E04h81wXmg8ZnyBqGymi3IEK+yv0HN0sy7y6Mz+

RkjvrcctKWdLlZyoerJs6W9h/K+2H/m308bCwa+dYGm+OTTcmyHFMVEIw5ElYOTZVN5YRTQEpO5nusLZ

2AQGnwBxOq4PKkouse2ElQVjcuv++5u940lBamKqsS
mg//D8+mc57cVec6MlNwpxX9Ah0d2nJdxBTA5ovAjBgYGKxZbo42RvDVRJzWDfZTwIrNPBOh9tP0n4lj

32ywsMqY6VkjAqYZu2tjnrpWK+/OI1LJmT4A7tgzleVDurEZgf+VwZcV0ZtSLj2qyF1vkWIdvqmSHF1o

4wm5CLS8vQHNMQX2jjRcIWnElb+dE2cjJwoDGrH2Tg
3PRSf/hX/hfwV/D4qcj2xwBMwfJ04gC766xNXw1gdLTTR52L+Fomifg6ugF/ZiN/w6OnTEdhsKcCawae

7xAf3w9BjXgUxzvNQxU/BjgWxt6MVb5WH5qUZhVRBPi9XMreyzF58klc7maZuV5I2EcwG6OlqAAkYZCG

P+AnB8XiZ6bVlyQcTYyqJgv6ix5tz9kaz3fGmdm2Gb
3Uz4E9A7cUjyxN4TgDSeOVFvmhUCwjKixsO3+m+UWi9si4eo3wvHk4ksPHmn7y6ETsOsEOcdzSSK9pE+

1OXudaYQD/07RsmZtCNUMjOGrq3IJ+JODEV3/F4z/mF4eBTgqanPmE6IUV5KyZ9jlWksFYpd/OlWEvuj

ECp+hBDyLyJDr6pV2MV8/dWsLGmGc+IoQMMKClnDMy
gP8ZCsYbKEjGwRUo3BfRdubW20DTJDAGLn8fRLuFoJv9DJJExOnVrUm5lFCoRiC1hXVjczfS8ZWDisGI

RUjjQoOac9JBaCanGgULxBaVojvBQiRRlYTr/LJwE8T/Fe9gjp4C20bm1RO1+2zpLxMlhuMZT93OypOq

ksWbGQlfsbPwf+7g48sVs8wxueHBrrKAKgsCPedfGQ
0PWnjHI3vYF/HJgXjuimZyE6qSyh81+b6Poz/wknj+kbj1W/7hbneedA/tuXgi0oeR/uvPc1ijGySScu

eY+UT3n2X33lprP7lj/Tz+oYikjiGTeYtnZbMxyWcMlA/9eZTs644tNU/NDfpXIYP6nhIIA3XUPO1FTC

5uECaAi2vdacqBWw7jiwixcawBAeVn+K+DVNxpVhDL
qGw3693XJ26x6L+dZxnP0Yq4Dd6ytu+g0dw2VAWbfvIZuF1LprWOk/O++OFba8rPxtgu0GPezAzDkiFJ

qjtlBHHCCzsHBlO0aIAeuPoH+GLhYHn00AF2ZvM6U8unrju5ySzzaOFzhK6l254LOEA98/IDWf+83hHq

ILlyopYdO82y6uThQI284NVHwaOsQ4MJfcx8i5oe4O
ZhbhFY3kwXSOT6LadDrUOxJxhmfLiTbo7jIeO6YTZrjWWn603apk1ccgCMh30z2iBuB1BfidPhji0o24

qjv6TiAuu3S5A+7nJDAgi4XlTyQhNLB4vGM8G++Q/Exa7NyUn/3+PB7Mai2fP+Zxat0kRh5hr4L/sBKu

t643mER3wMHLfovsL9lNmuvTMNCm7gZCTyfI6ND2iO
q6V8hn40X0iNJbYxEHyALrmJiJQ8P1xVw4tCQwI57RAJi864IaUn8BC47kZDMEd2Lr0uNEg2jrH2FHS5

UpfHLEwb/yfCsS9CqzhpJ06GkYPkSUCIommzcn/WzzD3D8klTHvLTjEaYd0pyQS37uQv1SUguoHn0Ddh

AFUsMWQqNluH6Xg9LYHr+Wp978DTtT1noZ/nrx1V3g
6H/2H1/VpDEYJtouc79piT4l9oY2Br5vh7hbfEt0nn9JBeOujq61mu2ZhCHuioSEyNRALA9uEe6ovykW

7zJNT/dJKfcJFJB8UuFhFVQHwAL0dS50B5ztoMGD+TA+ed06agXfQtA3lXvVMH/5JDLE8Sv0lVreq3AX

TSbvtzByv+HGGw06xxSrlDJT6ByHZFx3MrUTf6IzVZ
E6kNlju/QfsJNCKfATqgtLKs9oR5hAJU8u2A99scc7snr4djj7sTjQI2U1t2+htir9MAbvLwelgBxIoH

rTAt2Zxw5RurCsTU31sGQ+3iSXPvTk5w2yTBtvUL/elhTPFVYAXID8jazkWlGrv9/abevdO9Vo+56mWb

OTvSsEtOY4ZZB9ZLNe7Q5nu82TYzGuxRyCJr9zmd+E
3S2sTxGlkNvU758AbwDu8WM0evi1GLOvB+se1rQinDTxVoCv4pI/q4M+09W0GvIbyexwZqLkNIMtPRNJ

0EkpIo4VNKoxlIY+0uDmDm4kzgojY3wTerMYR7cAngbb0mo8jFB0fwqdQ1WCJhR4QMhSM9JbXWvfFW6Y

P0yg2ln4gE7EOdUIB+KPPveZJM3+GApM5btp0i527+
YCpWzY3WgmX/z0sSlaC5NBjO5UE9l7vdlMkjA9EMiD/c41PhyfH5o53KSexfyr7tyN2hHMzEcnBY70zQ

BrHzafR8ealcYz/3zSX6974khZ4uC2+DOtN9Yez/YdwG+u7ZzDfKMpSwsIDcxnzSnVATLg48MPf3eiZk

z+rG5KYTMLSGBQlJ5U5JQe/en2rkLYeSaurF7L4oXU
GEEy+YqUP1KqxA/BEhq7byISvQ330+dqkEzXYxFV6c1zz3zZBOKk9uCxAeBHkeaKrzdEY4Oj0lK877Px

e7CbIpqakA3RhtmpTJsDd4Tv7JEk0548HaprPqa1b43blx018FE6Dm+rOSTkUYiwiJ1aZ3wA5NMRT2Dd

6Gi1kbzDQ2Lapb9WdA49yttZrR1pSGzDxiEWqCQE0o
cunnopIYRsdd1xN4e3SY4zU7Y4Y40qACFjfSEqJfyrzRImuJS7BIqngL0i4y+/ijoq6RH8Qfo6jvgHNu

r1soVJ7w/gT/pAvGIktPzF9upuwiuJNVlOir1Ebeo9zD14w53VAFa6Q/syYQPLi+9Dlo1anyd+kyouuL

h+W2pX5dgdZN1POyZ5w3QtJwp1OWJqYQ1ZEgCzcK56
eh/0Llhw91awtnvsyz6ekYrlJR8cnhz8HQOCcQVn6qPuxN2AzJYRhY95KSuEH0PRqLlQgTYEFhV9zFdw

KoSPp2UBM4ied8Zz+WnYQu9XatU0KF7/porEVbnAaSBVG+cjzGPvzWR4BipuQIUIfSMfJt1TmvwEpldy

Dv7bHmbMDHk4RWk/aqdJWsNSRTDEG5NjPXtNY540eq
EfYQ5kcQrO0950nIX7LOIXdHvxPTBMmpyq3waVqRT/EacR7VBJUu83kmS0C8hqCrCe20bJgzjHmN1DDC

nVH0CFqyMtBcUVJ47wRuV606gR9AuVjZoEKfjXj6jobTe2ZNPZ+fEv7vUR00l34pePtCf1Xti8Vrb5/6

CMMaXq02tAA8Y3fLXEsOcym1hzGEsLEz39Qp9T3bL3
AzcF6vCty1YYcwjkSjEXOKkZz4rxYt/WQ6bZ/zln99aN9EC8KL6CqgvMTH76+GaJf9vkmUsUN0EjEqBw

1SBONQ5v0VGhTluxWYdx4ArJzzjQLIyxXQX4HCOd5r1WvCZTslcqBqCSWk22YN02a25zF4zad7HRwkeR

L++ZltGqysLnFLYqsb+MIcXynPiko7exrowQVt/S8J
Xb4ADXWfMgDcZqWNe/dA6oZ3RD+1ttuGabNssrlJQM3nIf8ZTuKmQZTwm071VbxHKX9WRqwGmoJ18V1Y

2lS5s0IvVSRLO13pLIWp/sc/tftgNedoKVAO4zCiqiEsFbmh9o+6exaEJTM3Bbk8YJK492H0ifs28lKD

PT9dcabccsBhXjZoV26K5yagDI9K0SZyOx/0e83NQx
6uz7onuzA9LvYLzurpJJlVKGsLr8zat6MGt+LABkGvVeutDfo3h5IPSTQp3X165pUEGU4cfulBa792HC

Bp5NngqyqYQZmOs0Xz4kU431OZ2m0P2dcjaYs6EpJwLI949jsFZR8mwG+eaiOY2htgg/WU7t91bAAsev

YncWtrmtv7M++b3FmrHUYPCcUwBBskq7uWoc6jhG1z
5yuJTOtTt9xuyygg0LecYQx0NoMeZRzPk0NlGqQYlfG9Tdp9BP4bMjFGKXw/9+pwgLWxxY0mhvEvFpJu

MNiju3cfh1HWL3Z2Z4tXPU/7L6L6v/svovq/+y+i+r/7L6/4JVavKv/9c/ZXAxURZQ3BwD8T2NImc5Pf

1XZ+5ieuhSP6BkWdHzuxU+XS98DLnpHQvdjUih5xUj
Nwj5Tb5zSDmFjdMekFCzfM0i7r0YvA0cx7K5Hz/C+dyf4Aj5Swl9V+OgpbZCoB7yGGuX5tGof62jKbvi

10O6TOKtJiWOrki+NR88poxATNTYq+y4WCzt+rXdHaBP/o+AP/enxKRvNedWUfVE7xfxEo/wgqFZhs3T

pYuUXFhcL4o//4Pryn8mBRnc2E9TBg402KwG5/cO8x
nKnLYwVRfnyp7AwUX2ZwfxTLg6cSNUq4evlA499qAHMFbHmWidtIazAn8+WX6McE+9/MY4bZxNNHueuF

PObdf1pJ/KHbNN8sBPX1+srAYL4bPViMeCrnVKHnybiiiKad3wmv4TxEgWAe4lcYpIqV+qZJ/l48Zczd

5puOv5VLfHNt+8IFIIx3Gd08MLerX4Aez5Hiu1pYB4
0QqhHzZsnY2ehAxj/mMMNnZJ2DE7sowtDUoecUOUUZD3ty+ZGNyuDXryY1lM6QwFr4NLVhuJQZx/Pmxa

gCo8pvmO9FGFiqInYPCISQPJ4EeAntOMlNivzmrO+0M1j0avKVH3zo9683C99Pztum1jCmDkI/vdy1dK

qmVXlvr37bZuLOCtJIYfJy9J1MLfo6GkkytbltsbaP
XOP95srZzNWm3Icp/ZSkn2+5YenDB36JUS6UMgggVfDqpq6qCUub9PWEqnLBpkT9mBJ83vLvOGQ0hdgT

GxTXSfnAGPk7CED6PgOqAG2WMrP1j/opZu5A1unC3aAohalEyoK+unh9DAae+yObo/cZKTVly5Cb5+Aw

JdYeoionyokMxiR2PY7wum8H4ZhM2BAeVAS1Cxi6US
eLu80byTbw645OwPiikq+GXXmS0l7ug3Wg2+EV6Y9G6v8d1GoqT57FTLnmoCAJ1g0Pdjrk0w/WlFgCE2

P8LNqwpwA/3ns7mzYd1tvhpaTRbvECiaFpyBONvZAfIedLY0gojqGkkpUYOylPgQhBvVY/ysuj7+wPXn

A3onvPTVhweIlzMY/O9ZCyc29wvInju3qP4l5X2Es8
cR/NHmwuUqEOC+M/iOtQrXTJ0aM2EIuc3vnWyH69zhn09zSVnF41iyljC6Bat5g9panGAY0tofF2c883

94PQfPjGI6mLKdmUea1DKudRnY4MNOBRcE0cGZhJAw/cTI5zPvnzNAOW+B4DnmA3+AJQSNxjChmZc5FS

l40xN5SmYl9e7xy63Bm5CzYmAQ6NsCc/jFJdL56wPl
SKAAkigCbvaJf12Q8tCiV/YGJuWrg+ppFu5trkAeEBWU/cBvYjXoPdTGbX4+4Ok3kEspVqKEW8U43aFD

yrwvDnFChf6Jx+F0MTH11Nh9ginKzAIZokWmCIjVibFTFLdaNGctK9Bhq3n/29LZnb8o/+6QP1+D3+C+

kHwMbKW6wEqqcZpauvycnyW2DpUwHo0R4LTEMf2cZU
LIet8FuC+6GKhJEIz2Zs2iiEf8eXbBryxOnNAW2pCcjz5jl8JbpwGeqMvHKyQDikKPRKMIPtmuAHEcpZ

LRa1Hzr+hh3n5mIvtZkzsWT6bx/CmHYgFPbwxgqxnSdAGjUhNX2WNWUHOE4XSiW95Ak8kaTAJHCbF99v

kwJYUST197xqRPxy3YPU3KorEV4eZOAkycja1Ddt74
ca697PUKY+5IW5q4yIVZd2iinrvm4a2Ox+2hZXP9RHRhCvfugImvhauvWEPxGJPOsRgz4hlGzNql5ihc

VAOnmNjUMQYKvbHr3ds2A5e1lypUx5Ot47bqipR9f5jP7FOsBRwfwWLjtENp3gArcu5uL4jcRw52hD2W

QghCPXn6fBjhEBfbHokLEM93T8MXFmXxSMVs0OEn8q
u5zpHqS3MjHK3erMEQqM0KlgqkC2S8DQvzGjkS70dkYALabkA9RTwgBr6/33UhN9x7knOO/gyasTpiAT

EWj1/cdhGRwSmKbcISCNzKSQ4qKAqBL4Skdvu/fyS4jXaB3YRNRddYJlHTsI3+C+9Owlk7HDYE+j/pEw

QSTjTgwYdzl+wzuMlDag4ejVvf9sYmuCsP1L4k9lFy
HEL5ccL5Uc/wQYKpAS+ArMezTX1iEwNRnT92k5ESZocmtXWKVYpBniEnPHmuCYA88YF6kxg4RRTerVTB

272NRkbe4RREwLFiehk+5ovXQIYiwBtLolikF0aM4QzQdzf/BQ6uaEcUTIQiuAM9LymVUZknVX89qXyy

5Ykr2aOddtvFK+WnwfdB3Z50Eo1PwjOcNjDEIvJZOn
6Ub9kHDmZE2iKBkgL9g/hHIvLB8K35kKFjj5xbiYQXsIO/94VTo4qWT+wTHEF01rhq34xBnMG8jyHGpp

kC7H8C2YK7YdlnpbcwqRgoIWOrW6hh83zDrKCt8S5zj5wlO2ZG1qus+lknONtSSNxAQnFppDNfweXXqp

O+08p1FKtMta6zhWP2N1cXmJQOD4kn8wj8AFXp9Ufd
mdRIJYpXDwihi1wA2+paNpQT5OSZ3whNBRVUYnTOzUe1qIrNY0P16cAaTo8ybr1TSl8b4IQsUCksmep+

wI+UCCKlBduI9u40uiL3kh5URgZGxQPzIz28LF3B1TD9J4nU+WHWryb1wyF22QQA+yZz3JZQl7ANugzN

Q2/5cLcbDwcVfmKgUuFiXdn+n8HTtjxfYHITpbqzCg
amu1vhqJ93nnnzkvkEpFx5EukhMHEcMMWpEoRrzrO3iSt2Yva+gvJt8KGGg9zPFgL8xj1XLwYv5WFKdm

JGkIvXYhQsoD9WcN4DrMwvQuJKZwwDDUx5rlj/dNnoJ3kylZfueQJXgD+VF/uCUcPW/YybIciNCfbqiQ

BgmSSoccyC8DTEC/0aDRu7Vr1tytP0NPT2uKaal6oE
1zWK+xqSfoBp6u+PRvUuG4zX9c89Qx8SmEL63TV8qZqA1Ovi23OvKTzAXtkjpWHDKY9Ne6z5EHxHEzFN

dA1qweHJP6Dw9Pid7M5L/WiimRVo6VrznH8ydjhuBIzx9b3hOU/i9rHWrd34rxud+2WoMNLNtZ/Sz8rT

4607mJ58yFpzjZ5DsnmQ9Qsaoh1oi0VgQ7j2+VQEd/
ScotGguR31kJXe3pcNkE9qDaqKRp1RKRWbnDXhLHgafWV+pS/ZJzLsLoqj/qfb7nyI3xNuCjOXfaGCRn

cCqshwKN2wsQ/1npUMUoYbYyq2agaXfFbnwkOWfVJN2RUG0KZFs6JOrBVV/c/c88HPv9/+RRQWzC2JRJ

1tW6dAe4jzNy4LOvJ/0MhjS7wbjDILMjNCAAyD/GAk
8doHvVBp2mMi3X06ARfhBk9w0IlYt565oBMGbIklbHzJ4EjIpOt450zXasvG8bR4trZ6yeYilvc0cBjQ

4vR8NIazuyRwk5PKY0fDY59hVAYZiE4gNRYDpQo45AqaSo2SII3B6K9edHmhH1R4ikd62JKcssO47GoH

mf70nN7ILL5dwm55XKNWx6TEuWoPjfIUztADG0H5x/
LF3FsoZF1daMj+SZiN5HnwSBGfcLP0JKfW7mUJDT0ldf4V24YHaUqEyGBcfbCMezDW9Xa1TNF+n8x+xl

sijEpuuVqZzWWpYbrxy/mSfbNe8g/QAnnW3z59Q36ecpj5iHxIn74H0drjfF0O7RqjeK+k31sCEN8KRs

gOkaW5W49TfYwfmdYATFEO+/D8I3ugvOAxnCjJ8sdO
FzGnqg009hhCdELVd7dhCPp1L8/VsKgFp8GS9/Z5Gw3f8rXgHvIfkbf6wZN6rFXmLIqGZ9ES9CO+GgMb

F2eVGukkYLXYl8KorjQ2BpeJf4ytjto8M/5QYKhzzrz6V94ANDw32jziteZaY7c0BIbRfKDdSZjZBu2n

D7KNvl0Jv6PCaigb4QrW5iMFPzSksUwAtONTMCJW/C
hIedtKNtunDRuhr4KZeFyo8v0Sp25ndJr/AbbGyk7C95ePzkBUhQoFS/L2V4wYD/+LcoFZFJTUTpFPX1

5CNvNXoHQz/1d0f/vVchiXPKamdjKSzGnqxc1o8X4iO6SLYw79C/2usvGI2LVe5GIYzkrs6N5MtKminy

eNm7v18/J7FWbwwxC7zgUZhGNiCj1KWaiOZVqolkDx
x33TrQHogRQ1k5BYwodXkCeV2tpCMn+0S2s9qo/qW8loABhfujto8Oq0edxW29TpBEfGEQydCwII9Kjj

WLGIc2PrfpZZXvg/iQ7XRSR/uk1613yKoubUYHR2iQVybfA7o5gYeMDbGjLutj18sUno6tM5fqEWqUx9

VKYO9v99XU8/y2NS/+m96sEh5Apt21VmU/JAnDdxpG
243Q2D1Q0RdhBCMelv2Go69qH3UAU8Fp19+t9ONaOYXW33MKx96ChH3VasLsRI4+45byixJ9E5/dVt0l

o3MbOtFx937Je/4gXcHa7hfxMG0arafLPnzrjRhl1HRLvyODv9RfrwbcRT7iQT/rynYMhJ+MkipisImF

iyJ+4HUyBKRvNMMqcT4aMHG6B1oimB4ZgggRY7Q1K/
WZm1wO7juZENQWP2bUVh/eAz2tWw0juB0iBzR1G0UrU1hITXzDLtBW/S9dCIih/BZ3NOIjv1viF7tK7N

sni8ydopaBp59mBYDXERcbMJcZ29h9TdgZ47qq2zjuu70olWobz04kC2i4T68oaK4PEZP7KOiJZQO+Oa

K3grWWH9nRNg0lqwmuS0lknvd6rr9cNghWR3d8YtO6
3+AwhOw0uy8KYCinFkcw6nfW+u6kLShnT8Yccv+UHBV72Xw5XKaaTDWwhHDlAueKzD7tjyi0OLlp7FAT

aUGpxnAuftQv23EfYESc+AEJIJPfRPUP7Z/PGhPzQdkhtFn6ZmaNgQ4kQM21ktSRvvBsek7iryulLQ7m

FveQZkVddyuqIc8L+1HQ5qMJWpc4x1Uaa1g8LMGey6
UBo9qb2CIpfMb0wmhoUi/Z6mFrf1bq5nFV7FwobE+I2dmhziYuDnVT1NZ/MPxBAESvQWmjr3aAAmSksG

pU4LL+osgkD3eYkqllNkHu09O8g0NA3dyQJqRGJKxySQYnRQqqRXc281u/AWiarli5U4PGUcLln0z5Es

b6Gv5BqDmqvZo84RRCOnlPJQq+9SEvbOnRyXsx4oyN
HdBlBu2v3gKmgDO8uWKDcFODWbaISJr4phTWm52GDJ4bOi3CQfSnEYe6jiItZzsjLT7R/XSpRd99Mjne

qGtQxz6UuWBCAC3zapzIz9DGBUgY2UTEDOS/SG3hP10AYDR0boy+CB2w/RN0gHbA2onmxE1X4rnHdlxB

lkF7q85ob3EsSaINystD+ZoNyKqXJBQJDKoi+6g4rK
pchwas4aIhYfRpxD7CGOiJUO2R2L6NFMC79c8CshEloZX4XSIq2XqR5UyE5TGyJeq98gu843U+xx0uuf

EFRSMz27vM1eVnMbZbh5F7Zgft7bco4a0Z9g8S6A3nFWYEqlK4uacmyrsyu139QzwCl1qdg5YmEqdepc

M4i6BxbJwaUs8zu4l22vv7kYUYIsa9IxhTKjCZ5Sf2
T94RmyXB3gLs2SqCSETsoxIkIlo4ScN/GTnTdoZRawse/UxheNkXRJluzGjOSf7iwI2qJompQYPmu9CP

mkudg0amAhqkAf5VmRjw2QW6qSubZT9TxdFQ1fANX4dxWyoEBJQ+hZ26lD4AKe3JCZT8yX2N4R1aUGt9

rkcW5Eh2sgj6TYIga+Deg4E+04M+AVj9MGgY187hFN
Xfo+dQbszVfA9WazkLCQ1vfNKSI7Dj0Na/Jlfl7D/YFr6b1hOfZA91836R3IOQodzo9k90assF+F3goG

HykzqXGUPLr3+/zrSVhw1VT3PsajksgDE3BhXZ5/RXi6K0g62wogkoH1sOt+BeFJFZzy15iVcZ7L6xl+

zR4yBu6C0MKqBipVZreLcKPE6s0cu3Fwfwl87zeYix
Tzh4CqkiX1k0bQ4rkTz3JZCVX0PSOmSI/IFG2iIbsO3BztYJ3DBfPvwQDX8wSuN2pVC59nNJEi+TejRk

UUngfj1+vXMk7h5tZSteowIA0DvhMW051UiO/p1c86KPshsA5lmtvSkbc7VMS2MaWe6JNC2qM0uDYzKg

vCYvnbltu/onmSrAANko8H4frQNUj1i1mRw6bVqUMc
dNjymWJjKQkam9l6ltwgdkEBhCkU2bPm4kYCwjzMV3qCyciX65XT0ZcdsE+3Im2ALibt+QPPJw2P0cYf

IhlORYjEF4XO/N2ZTEzieFza3By12QnXLdJhBqw/RkQ59bRSMm0Hqg82qGiD2cGuq1N9hrYeiKO6G2to

5dZKKf00jRf4kElLrJ00cX3VYkB9RxOPkIknMZuI3o
XpgZ1A3q8ZV7tVucybB4PXf8e7t5cm1/eO72KjK93iIFwUb3WvQDiVY6h9t1CGcy6Q45aHlnsVs/0z5+

Xi3wgjn/sfhU7xCGv7ta9evWJ7hsZn0X2JdJ/K/SVWjbzUjhiOSQYciFtmYMYhcUyiuo3L8EkGjdxi1W

kqd80/5420vuGM24AqRnTMVr+FQ9X+RksZ862rTX5/
3HP8Q4VA6r9AAhLI/nBug8FzwPqAR6hkycl0OxYctfgV+fLruTvjVKdlwfxvztEK7fOAv0M2nJy0rMgy

U+TKgoRYVLnmQLhE8acXUG/1W0yCFnQiAG1BLY49kzuygvSmUvzr0fV7uRA+Ze64VpuUj8k8l4G2K+hZ

ROpdDM2DD46931USn7DN/qy13/iB+5oh5HjMKF6rp1
Ei/BA98dLPw+/in9vGQppAUVdU3NVbpK8+ChUTzA7f/571k8yABNsSVDphAhbKXDwp8DVc1JkGsw56ya

SRfOvYXzceRoGoWvH9Dpr4ox5NWZFAmqM9mEOdLd/mTUpM65W5CCwOiQfSpR9WoKA64OJniddJQuDGRV

ZnS9uPo+9K82yLXVBEG0GrRp0brLxdo38XULEF8xCs
Uzk43P3VF4B5q/V/5Mpla3DR6Ix9u8HO9SQROaXcTWmde6bqf3Q1LVv1KAzsu7darRaw/Gc9x27SwINE

ruSAv9592m66OPV9hKMudoKv5/zGgxOfxmsoTM0S7YPpmlowN4E19NDgGzQZRuQdrgWwFAEQf1UdIDQO

j4ziJmMMCIphKnEze/IqMumy+pgU+afBEZsf82Fual
zUR7oZ5HIKGh25wVTLG9qBSUidWXRkrS6WssreqK3MQGD7f7PUVJBienRUUZvfYvmHUhICAyvi7gR0jw

ksjcsJmZM5NMzjWe0o0UtenUpbF55zZSrpeKa7i8bbq/LaqaxP3ZJeZOO2WGOfleTPuX5r8384HJC+b+

TdILYx7FC8VEGDrqXwtVBbSUJTkXm1+dsusHp4wpDU
4myw7vvBH5gp39VqmLghptOY7HBAcn1QI52OgyAZ3wzh42WrbjYK8TiSGp/PukyMK99f+A5ZfMTX8b87

0SSnG0faXlQBdE5Lp7CdLutoLZTV5fTxhMdtdLltumxZU5r/10dBGTfR1mgf9ZlBsHY2MQcOxn6W2RZh

aOaac1Os+0mSzuvKb4SISrWNUmJlSk9OMj1ApfuoRx
3WkcmritcPyqZIxP+ftYDJXzLXdK49p0hhWhcFKZy+j9kT07RjGCfeHkKwtt5u+npqf+oagMOs54L854

LPVqDOahUP6lA30vFFMH0JJWAoNr9V8AcTi5qyonKQYTfRPW5uR5tuo3K8RaAEibgJiW47CiCLpBXzbm

Mxj8UbvA82wqbMEzUSj5aoOnJu1TmzSam65sAPfFfd
JLIKoRXIzvbPYiYxdbinhhXPHz1DctujvE4aRhotNmB/j+ax/4OhoP0liGgi6I14bJ96bLmz/LTXa/3Z

e4tV6o/9ss0x49ZqfC8BoXrXY1OJswrxxpxb6juZ+aVSl979YwfSsiZ/5pAZ5wiIw0vmQyyQdFU3xdMT

38fpFODo0hS+Dzm1Icac/HXtm+ewZ6v8Dd4hg1zrvY
D32TMPLrOQOA7LRY7416aVC6UwxX/8kiKXCa9ocjGSM9sJWf8B0DNF6JhtByx8TS24nv6lKLQr+mGcJm

G+L9UzwWlSsELXu+2X4H5hYQWSN8mX/vbg3U8l7tV0PwF6Z9kPblGa9zmakcEr5xj6dmDPo3mPCv9OHs

NBR5eBul7bWvIfLA24bZQ4VjYjIBIDzRwB4W2d80ZG
dA2e8gTdNfXByHssK7SXGLdexb3Jkg6n/LFPfj9+g8HojdjizCI1NAYEZDY38rjOLr2B9G8OWDmUZuyi

wegToafT+AyLM1w90hkkjoJ78h99q6JUFa18hgqI5+PevUW9O2fiRT4zNxfyc6tI95+tToyaA3E3SP7F

Sr4aq8K/ySBIdNSTtQasSYqN+Lio37gFN0kTIoxxuN
HFivyAtp44250VB7m5Tmpr+1/l8q1KB7uQdJldAZOwLWFuWsXY54HhEzCgx+7LqDPZbkVsgzPKz+f1hO

jR/N5npbbgOHnSW5qjoYwN2pvGS5Tw912jVll2F2lVPlcM3QwGbX+Rez5pXHwA1a996rO7jCVbqYKTFc

/timhsAP4eIJ/Ibckg2L5KB+k43YN5BnhlR1mF079z
ArPAqngoMm7S9KbNsrb1nDw63/O/V5Q2F5KNiNM3ghPsWZG3SIC4p/85YsdcawxsHKbOKu/75rM+RiJc

etM3Usrpez0pSkRE9JCQp5QKF1A9B0YwjxzFMGp0p0a+kkq3gVJ64K2Gf2rS50y4bRTH2PHofHlGlwIP

GV52TsHHKjm/3vuggLuL4JDS0qdG5RWG25MktKDymZ
w4MFpsHz8UDk2RWCfAPK25UP94731tPgTj7XpRQTjKgTUThe8dkwM/I3hm4iGBu4i+FjY7NjSct4a9dl

Fc9KxGaCxFtvyaTDv4dch2HhYEFtZjVHznuPcIfK/UKYq65nzhj3wYlKlMbkrplu5IjZi5u1bLGkRgc2

Y21U2LN1g9NrimuN93SP8TeFPrV/W9zCzeDI2L//7l
erGE4tNYNNfy25zgx9WjfjI5KMlMLkB+DRF4LE2ueoHvmI+WQlaMtQIfFE3l298T1FH7A9d/FCPxolyG

hDM8/DH8+PUuthH7wal9BW3kzE//tIqm5vY8D9oxDPk0NwOo/h0rJ0DZuFX+oqr5t50yffSqbbS4b2Cv

srKAAqh86zoBEtM8zuL2uVWltbQSC5HTHIlhJdO+30
FbGTLsl3QUkbSaB/ziexBi1aLDuYmsH3hLZLM9/zdM7G/al1R2kGZ859jmPfUPB6TKaJIRVC1VVepVl/

P+VbbByI0oVN1MzKp8i8A09EUt0wGqltkWp/N2YU5zYWd62491l3MsHzHPzd7Ncx6wMyBkbIb1m5yYq2

kjeZ5EyO3HAHKNlXkZX66E5z2m9BjpP01h+E3GvYPY
8hP1AgrH7TmhONxjAfLQ7sDCVw9kb/qWOuhS58OfPqLM4JRgc0CVV5JqWTpE392a/6pDv1ELjG08SCeu

55zMnIAkmklf8Qm6tYRJ28qIVg+/QMzTHLdXEueGVprWMvpT6Knp62Tuq+m2xxoeGoHCaFBZw5lqcaez

7uVLBA2IG20tcY2gIZx2UBtxhv/VMEXoMLyWlCMu6d
kVgAqzAmPrAQv4Ubt9iuBymiDeas8JW4fIbCZ8RJnrqKSeEY+3Gp88mnFFrIJEw4eXc6AumuXnhpay5a

OekO+QtaJM0qgsd/cAUiAm89XMwyFJYXi4MF8Pz6Alc6HTx4K+pYFKa2gCXlecOdPUsNBsCAkAg6Vk41

eahF/iFvNTZfh/e6REtYMsq38DtAkWF4NRA+SY/Kg6
H8dDY9dkz2v9VdiGj3+GFY+KbE55idAgvXrZKCQtz2g5WbL2RDjZc+yx17dpF/VRmk/za2K78Hg69x7J

7nWX9JhS5r7f+4oc8+q/24vCeMILbAZUUvXW+h0ugL38cTKPLeOdFhrqq/yzr1gBP/+P0F56TJq7v/AB

hyfQhWFprHY9bT/EydxAuI1+8kpg3IAB+CI1/7eHOv
5RD4JHfVis67BmhhYB4rctyqDn36yqKnFzUm2cQQHWIrPXwuSckDGx7Fsa2Hd4ROXK4SHtzMn0juTfXR

4POaXF1Wds4P3l1Rm21x22Yy+X2Hu9l84mHmYnHwfI7soZj6jgYd8vKMTa3ew0b3MfHyljAVdIy7S7+X

aM/k1Knrn7GbUaO4zzzdgD6703f9osAuEL33RMLK59
fz8vV6P1t6bdvu3W22IudEPQaa0eSMx1fKR5s3cw2Xng2+9N8f3fT0rf+nXkMZ8cDvsqJjnx224WoF7M

BbYtXijQOiIi18itMnS0qYQyZDU/30nV648DTwmsiH6zGkTcbWpbz5COsn8/EsZnRKMkwuUZiAQ7KZBx

VJ3aoW/SwH1O20ao31TM32VO27vABCzT8xI3Z7nIw6
+sz+vdRWOhOfVJBShg6cHssT3sGTOj5ZVf89D7IWdFpKeS1B1zFPaGtN61cxBZPccf9SkjY+Mz5T91an

RrWeycDkLQnsdOQzl6njtHM1PcaejvUhE08/NiLIvSyajLryGBZMx6CzmnFUAbMAZdxLLjxBSZobw/AL

dAklm/zFeYsXUWS25U7r+g4wbG0KFp2PQ7khmU7QjI
ZUig3redBIpgww+8MMUgvaN9IiDRCwpLciaSZep7a90K0RvzpK8qPUt7O0gwR9c4zTFalviW3VGjfJsR

gu4zzs88KiuUBMERJRiSOAV0Oy1y9AhQ8g+g4dDENReC65/NPknPGDR/TzqJt7INldx/iX4aIAiMiv2g

ucTDpo7o8FpTzFIVaUVmjZz0Xiip50NkY4SYutzeSB
hpePO4Hv+VZz7CGLshZb36R6oqbgGFZkJWYmOauHl7AY3fLUH9VgKGcg8B8ueCRMgCgmH5yeFE33llhK

j9xsIfyXwawTCjmbm9ZUYZxsJI7Wd/A3KXVAY7hezZD3IPJEQU7pKYk6Hlu/SVr7x+5gjW7ZnxQYNzpO

Fe+AXSh7VCn2gokO7nxqQ7DyWaSYd1daFan0q3Ht4A
pvJmoJlUpMCV4MZKyIPWsHKz/Ej5j41NG6w3+jM9QjWn+/CfrIjwJ85Z0parJn01xsbIdKoZazyaEiCf

fitlxiP84HgSXmdbf92kZXMjdRNnsXKxY7B8GKe6H5BhfgPJPhi1nPl4On4iwY1w0S2G95coUz6W47uD

DpoVF3Dhhqxz0nQExD7cdWK5DFblYx8Gt3xSrY+a9Z
8ehGGuyHOXQU9OfYepaI2p7q4xKdEQ6yMhMS6dkzOcp9e7olyED/Duej63FycA1/pSvMUMJZP1okYWnX

JsNrUc2UEXI4IC3C8CQyWQvXrelPv7m53KGnY4Y8Fmwh3HkzQurfCn+21ZxkTfeokrpdJaYa5XaWDwlD

IC+ahm2jJ7rQvYspVt8Tn+h5QLBZ8OxzRIwPh+xvMq
k4USmRAWxrCxS3v0wvkMBdWINxXI6lmx/nnl3taOItHDaYb/KFnNUPZ5zTftL4kAr7oAV6L8jD4ujkKu

fKD8l3o1u4zaRiYz01spIKmfEffS0dFOONuQOWTXS+jwe/SUw8htX6e7pZ5H4amaYh86UrwW6E57hT8C

LEPrQpo1nTah5P9BFkptUsggSfYPndy960dZMVp1sV
x9pU6pbTuqfnqkOnNImVEcGjUvEU3g6w5mc9FXXiqFqriK2A8ZRx7ooFX8+CpDedobVag80Kd006Q1Ln

3N6zdrALWxVxuAHACSS8fFLHmu1kTvxpx9EyQaiIXy48wtV9Oa+xYeI69QMumdSCid7l54LISax1yV7R

7m8HLk3pnofUQXgMUcc3iV2sDsLYf/SPIKe6YDJgaP
ZhpLYTvsb+ZikmtcSMKldca8hzdaPHMEVkTI9UdwlcAx+4fgtXEFaG+Mp+AMP/rganKwr0EKC6IpNBYs

rHYEkqHE5q2Jlkfa08ldqmkLDdJ2oFuLao+jZt6+Kv8lhiK6iHRkMQWxlGNbz12UEEznzMtN9rke8yXU

UIB5G4YTajAMiBey6JwGyV+L1MzIzw7G/lFn6yzDY2
JoWTE/BwGMG12pFftOy2r6Jyu/A3adQnTpAVHKbRz2Rsos5U5wT+UKOBxwPSt1jlShYff592byergp6H

UHDCb3dHX16bAjCMZmRkdjEGL1f82kr9ZaoqfI3fAXDAmzALG98ItKfG1l4WyVvd1KK9hiAyFym03XZ3

lHWBHL5J7fyGpcG3Odj//XE4y6RqULxzYly0Ey8P1l
XEUm3qwhG7+nZiiM11UqmdDhmmhWPV34+7cBmssFlt7k3AiC4ab9+CDDm+9vdVNJaPDi39ohSwjGAd4Y

CeU1xM2I9Ci/H0Fn8+hmbjYKxAh6f/7G8PZHZyFIybj0BwR9aKQdaNlWrxouCF14ypwORxw9YnCwswUX

tA9LiTKmezJC7mSJQVNMCT8NwO0MRWQa+c5AIdcex/
J/EHMZPBrR9YrtZw8Rf/7OnklNBX5DuZ5t1b27pDB7r2qTdcPeIw85fA1B2Wwte/fcmYpBgqyJyTntSh

vHDbPTYcv9cpWb3YJ7JaGBN+eqEmNr+JVR8rN4iwwCOzi/zcJjJukk8A8vxaZbPHFsoZayRUZl6g4Io5

v9Ac+CLY9hGJulCGbOzXgDLBKIrnh6rAkdWBM4iifd
ynifzvpURtGeaVDs043a8z2IXf5e6tapdKtAkYiJqSX9F0GbDDfHXjJyhQzOcU8qy4d7m8bq5zQKvlsl

K78csoijqhvRWCsofvmPT17RHcSm6a0V1MyMrllBXtjh/MLrIFqky/mP8iH7gkaFDyynGeM0NgmDZFME

2H7NeX6eM2yvNxyeoNCiRMgq1D0jtZN6VsJrrE74fV
Ass1vczEB4drZ67vmvBDF2T+xi+pUyCVuZzMP85pKSfZDDUrythrUP1cEgUC/uGbWnY1bSndKJ07mJud

fY2cYljJj0ttxpPJfP05NhtjK2O9enX9ZtiiIYXsx7MWQ120xW8yX42fW2DfHIhYoqn358n3GlctM+ZU

pDSE+1USumXsEaC13Oqojd+Uki4y8tfzDPwUUAt5O1
NJSVVzIqZG0OXRNcII3h/GFdZegROyeCXCdAfk2XRcxH3v9P2wyz+exFVCQhTiIdYDD0PvQHjyOHWxQB

6DuBzL3bQvQDA11igEA0UQwUp2x17sk7QGnT7tLadMkM5XYZMwuSghVRXIM1WuLKVfFPIKYb8G0wyNql

OllgW7y5svrCBqOPmhAxYV2Vgmps+yUY5AeyDNnzFQ
yZU0EvKxjEV43+JVT+PWPi3eKSWbrEq+hBN+rS3JAHJ7YrW5w3Y8E+4j0gy28BUERCamlF3Svxx9xc5C

o7MYnqiNam8vVxasEHT884sPDl4Y/MJKzI5HVz0m8j3Ukv7qJ0A5G5liL7tMczdYe12whivH4XBV2fNM

A+wfv6zkl6yTgyKWxVYrpSt6+w0ywckVes6TnJl/II
ZYFf70jyIYMq0gSLYe7j0mL/x91aBpteIU1KHTQbxuHv2ogwkLYMJ5aCfW8zbh4U9p3W7HYV9RkrAtVy

bdhALkwADEetF8pL/3QI44+P7+wcGDgt/jyPDxBEa0oiKEU0K2YULyUbKl/5fLhTyx231b0y5UAJY7JO

d15A8csbkRdgQ0DOvBo/ykwhKd3IVmPGpguOmtUwoh
hHKShoGixSriYw8NEpzmMWRi7dxl6QdP28sx+re7NEOdHih++uvBHUkUmqTyF0AvjNeddj+3oScc7D/z

Pdg0Oa6HIj21h5vjj8EAFjaPF+4Dceb3mTFqwKrxKnQQN/oMmfRZ8lITTAjoM6ieC5ePKV8iMnwENrra

Vw3o9O5U3eP33oxxmooiO+1FPXrbDk191JOeDo28z3
UzE+QzE4cc6f9tfSKCWzq8KTV6V4WRjyh5u3XZhWymUg55jcLHgNqwU/hSbBCCGyPGkqfSuabLhslLhu

Xa91nZW2N0BcoIyB1mDUxbJDlbOvOjhJ3FVYmb/jHGsnLMarYDkN32tmpzBlHn7djcIVTsWzkSuAmCmZ

AWNhFl2My8dGcbcCE+fh/FZVknNv185bbJV175OEXC
mBab86LV78DlEkZhocTpJfo4VyhAuhZLGt1tdasjQsWg/PdvaFa8wA0ook9JGB6yATHFdFIHhex5kKxG

APyMJeucqazlTlrx+iF5NXY4CNmIV5ruEbz3t+Ke4wZ7RgvQb0q+jWQHWQ4tafZmjXZoANf6w/B8SDo+

XekovMTLeKz+xkvYUalVZkIynfcG0D+cJKv624JOKM
1donhJVrZ8gDhmF0FXkWjPnwUf3VfYCBzF1eDiMp1khqPc0IkYGSdvPU2dJ+KfSr5xq/ENjX7Bk+pciq

1MtJeZ+wxVugB27Moxw1ez0eDCGeWwalK/WyNBAmf+uYypArDmSnNppfyGM48qmtNI+9gKjOvOIlGcZz

iOCfztH7I5FqSj9kmp+0LBBgCKEidSJTWAQH7mD1P/
tMhn8MJRaLmPhFz3Oit41MSlJAdFPsOrvWPqkVLxvNntv+NyRfTI+BeeL1cNTBNBEB4MhWhkJGqSVu2t

OghJtH7h0vyq4eSrdbg8hFO9IJ9imZg7LqanKmxl59uyMC+OwdIjZe2h3Tb+L1hnrGGPcAuVO/o+JPJF

ozHq5ftOJQoKj5G64aqcGtT78wjksS7YR/6n0dqRFW
sf3eHswEsTug1TmCokXbP8b4agpwU/zqcqVxK31f5sgyy0qF6EFhsJdtkoQNol/qzOfnF/0cSrjxpoy1

n/w5a5p/yWbWV0Kfdp9eJ06X+ybnii73PIXmDqpy8iq3zc6bnkA/0ArF2LjFBhZ0Dtn2TjHvqmqfUW7s

kAKt1PrLbMNrZmi4XpzRCpSqrEMr8G45AzH0BF7yRg
WCxGlEUKdAw80iijTGEOQshPlIrJT/AISbbUDZqvbJxIPH8sZdm/2VuB/8hekgCCE6tSMb8oclE7xmeE

+oNTU4MiFsCnHngUpHBYwzkYLXeiVoMV1DzFtePcbmEQfA77+dlroIrfJ9kHUerMKr+7RoO+DeGRnfJq

7mQrbXtXNqh6XLJ+fDvOK2pOcubdMHAyXfEyBaZyET
Ws9CreE3QUwpL9maZhU//33eETFySZu6uzdcUe4cRStyzptNuvjqcAOAjPGfFrtl2f+gapOh2UNQwu+f

qa/nx/gOd4UaNFMf0FTMnJWAbKcGkuDehsushVAyhajeIjOXDwLotKvr2zlAVp8rhtfwlP4zlhf7aC3e

rSvcK7TJjOHRIRTB9n9C2V8xwhJkFZ77iUhmE7ga1L
NQsVRXUHLujhWx37Jy0/uUixT4hH0W+dBhJPnp6dAGqmOvCsQ5z1LnPt/ffFb4qV2WghovYqxNvs3S0Y

mMcR5STJBSLBu9Qq3MR4zwxSintohcz1zD+mxgntjXBWXOOahBdHk397z1OQ/owZ4P0TnW6+xMMccqbj

zv9vWG/QGsGyZ2o2v4WA+9VfGoKXVtDU/vPaxbmehN
q+YwgrR1hIcX0/YBP/+KKwabnEQ1wVDA/1oHz6MVi3L0jyTPVbugiW8FfGN3yuBMNbWstolbUzHbZ0td

wemNgJoDiZK+mj9Drr4dvWFjLqyu0+3Q3WrRMCUHG8rpIL9ODsK8YqgOEXePPJT5aVkvcZluxPZdOcEk

8MAj81uPAAAYfdGS7e6vJMAOxQuLbS87R7fcsI3uc4
Hr7tdNCKfAImgR5dracm6z+tUhyt55W1YzNG7I8hjOjdbM3U8gvkecgQY5WfSmGjxIoWMy8c3ugMlfX4

5O1JJwbY6O8ogyu9Y2RUp17mZ8dfQRM/k5CbtsPggLiZGa/SO66p8/0miXsTJ91XUzacy4clJI6fqXlM

uhKQ6hQnX5gCQN40OUOfbiYii8GADs1EF+KRglbbCr
30E4S2tctdyVnLdneR23ey9xQBZjBlbe8f4dkUH82YAhuGhI9rx93ndbG1lB7S8v3e9elnVece9pjXcm

6q3nzpQmUYXzeZ4oN6hxh3i6Zc5H01sa87ldfH7U80t+nWK0gZxOm7aigXW31DCgQDboZFNQNHKud0GE

E0w/CHpWsaU+b7j63FpWxY/svKG4g3oDymRDV9BWI0
biYf4/B+G8fYnKo+H/LAsW6DhGTlM7HgJ7FdqxzWLB31rPrqrUaUbFSh5oGEqICM+suK99+uX/5Q1e7X

+mSOXMz9ePMHp9gHKh4O4TcJ3ktKOs2HSxtvmNgNKtPLIDuJCJt2ngMIOX9zdAh7LETJB/2N1qte2pQh

+hQCmKsNNaIpgkew4ISxU8DD1cTzZ2fVxJ7iqArbJa
+HjYg1tjc9j+L/zdcAr/6+7bUksH4H+97Ghsm0L3CQpPXEO0pvpr00BNgE8Ntlmazw3KTNWA0kQ1edyv

TYx19qMRURlqgjB31G6bYtARXNmtiJIMdOjRL/ZNHHKcdamKi7YAOi+LK3dAVIBHmnn1jUdJ8IqTfQZB

aCKGu1AyXE07VQ5x3UTZ8qMdSP2KYVq41BQ4d4ZDqP
zos1i1r1S1uKAJbQSlQMvEE5OvAKcHGd1Iy8ww+v4yWsg/fJMBu44WyPXltBEU6evrzG8zrO0Dm4ci32

R5kZaBNtY5By+5BZSeFr1ITTxs0Td/6QDMe906L276NVsEExUJ1sqsNAuJ+8QFMDvNSjfXw3UuIMG8PH

LxGSqmmfWHl5KiqrEb9JTucJgmMuZZzRloHKbFaMZz
OJtnEhlvVsYs2QsBC6BskQYncpP//qFCJ6+ov511/1c3gk24SkB5vUvYxPOpDRNBF1w40lt5zWc4G1Rm

nQhcs6HQNn5xkKLYnYZLohnoGhZNAxD1ZBTeaB+nKdUNp5VaI/1akV58xvPhLh3opKTj842hYA9LdwJE

G7+Hb4wdTF4fXEgWp3L/ftCNc1IZO3vRnCOHwaabM7
VnBCPisb6hYpe7NrXsjhkZsaK9zk//zipPVvxRWo9hLIJUz+tp3UmB7RF8nxCe4U2fVuDDwGfgQtwzsh

GpT74neUnU9iFr/+jhaRbJf/rwK2oDvCY9NVCYCy2qc5g5/5kORGtYVm+0duJX/IvPAoedOhQutmMDCX

3VKrvbHc+m81PeJesvFL7UA4iBY7f/rMMmpqHJ8g44
yWMdHVwdaey3Vze3z5A5N5aI5ztx1eFv777CWagGOH3Y50oFBVVlraMCU1UEM68RLDqwc9n7k0QRfbew

dErhkkFx6KZN+l6g0H+ehEBO7TDnLr/9SU/DWSXbVUFgahxM3tb/Qu7rxhBjQ9IIJ9GEn94UvAmo/vbG

ySicL/hflny++EvTITuD2CQcUxVZXlhdrJYo2dZWcp
PBc++AWksB/x/QqL+aNi6mDAxNKyQKI8lqPP8Knoe/ttMYuqg39/+Gt/r4CIYlfkvZEMP5ZnDjKKX3se

WcJ9ZTdSo+GR47Lx5Fpegs8uFI/d6cRiEF0YLGBla2vzLW//e3b38cJ//BS7dAxBnrGmALqF/+nNoXA7

eoMxVtQdRgcCTFZROhrtyZx/318kLFOKF4NG2s1jSm
XI3l8Pe9o1c8khMGr3y/xIqqu39MTTV7qvmIjXbmRFRQIij1OkNtj0mBn0C6YyBnHDgPB0C7T0G3vtVV

unrYv4gaLeqLXyA1iohphZ6xyOWwzX3IkVnfTouFQFJjXHguu2dDp94VvSujvBWbvkD6SHftT1P6kSs+

2vMUJIwmCGp4arQOjU2kSpFaUWzGbyCiEUyvdEry38
tt8wbmi6wWkCDz56hPEbVOEJ3RyBBvByabZsZ+ViuF1SCzdbQogxxl8KasLVaHw2X5msE3LNlKCfYfs6

dYsLJDHY6YTHqpBAjjqzl+J0bghMz5dvO9g0tQKnK27/7HFlsG2Mv+BWv+Fqfj8u3/1xwbBSDjGDBEBX

poQBXI50aXmvavUxDjPAYpVJo9PwXmU28t210RXkUm
xZeZARZxsUbmZlgnGmdX47n/panQ7qunV0/+ECNm78sedW64G6zCvYLdMsRwhrd6FfmbzNCnr6sPG52c

CESE6gVMqrPM8a5j4ZYD+76yafJDtCcUHMCFMxCYUiHZHJSznf1JxWXREOqEyxXxM8ZzBd9g2FU5LqRB

QyDyzErU9pmdOMrRqmNxYTg9slVi/NSlcDHFx4n9I3
+uABqb+pWVFCl4WfK8QjvQfoPcQTp0PkoUdKwNUVSSkRKF0p/iOQgncOjG5yA0W7n2e1U6smrushoZ1+

BuQHNxco3ACzBL3YObvFK1Di8RHBVWZ5TMn5BiyyP5LW0CsLQWqcXE/323dt/8eGgzm7Ejt8teu7nj76

urT5/n+D3hX06ggs6y9XYd9UW/NBTlRBkB7hFzGlNL
2xr5C/i4EM7JNq2u/mMaF9TsW+mhK6Fg2KdE9niveP7yt5q+Lg9MAuPPn124a4meorBgj2Tmt+tgrVUR

31VT0k+BxAc8xJUzdZ8WDQxClTBJPAjw8hcjGvvEiOKu9/QZ7NIJ/2mdEqgWYfjrIIgIWDoysIFZ6pfh

Yl57gSH9bLBfVXp1UUNSU5Bfw54v+aF7wj//rdcRQy
cay65WpN4Qfq9qJnsWorPFocBImsefz5Fk6EMoWGtXkF692sTpbuJPJ8/t+z2P+7moxPC2MozCxGn4nC

wIQKvObwTRg+VcHfAaivczQZwDVodFXxY7Bu3O9FIvzs63cJZ6HCo0AFGfXgDtP1uJXDPF/6yovue54R

/9uRDG9dTQDcwNmSkgC22zeHyiM40v4SvgRM2pO9CO
kHX4VYuc0NUvZQAZMcAZceucwC0aWTJnzF1TMH8HIsoehc59Ei87MQV+QJhXp250OrzQMA5XBC9ONDkW

7sTgQyVk+Z5vq7rlx17zqB4DG4xJSuKe1FvkkxUf4HqDF/AfxK15zI6rNJXxFpj0GyMdasr4KoekuUhV

TFqclZvAR0zEFLhFc5+2dFHZ8fl6ltEHwgAnCGeXWP
psgBDjPoGlQC746lMwjuOZw0/t7TRGR6nb9LjOJCtxa4x5TjdioORl+FdxuSsqHW8WSDufjtT4gW4f05

Kdx0FLbEDmxizSqkPuWcxy0El77EVi+gEaO/aBSFUF2Y8XrmbNSwufhoJI7mzWXT2+T8dzzvcSp59+IP

pzBQedkbefLPVe4CtK+wDGtx2QvSMOTQ+QxLabmQu+
GGPGuMvBKWHxqhIeEGudqTpD/0eGhIMz6PXr0oHr8btg9KiFZSIExyGPMIhuSdH8PPf5BUEde1KRokWj

vixvL6lj0ySg89/qjIYZD1b/38sV70ZHH/419ONx9GteM8dBBgP1RjPGcE/o4/p8hD7WrhCOSqd1fjIy

kWF/Tu4BWrFOzszmPcl3JlBIa13/ZeVGT6ilFtybbF
Yr1Fpzfp1T5wDy3gBKMS8xf+yyubaLx063HiEQdo7W6wThuqOJEe4d1Zi42cUf0oX9dHF9MI3W/dZcVb

iHqz+bLa5KeF0NS2V4rmpU3wYwip2fcoSj/3dQwCZZcaru5lwlB6lakGKxU4aAoP66/oslhSHWnKwTM5

CKcfHZeQqyNuc6rbp8ezxHUR28/ylHPslEIVtGGF/W
4aaCkJBYdf4ei/9h9fEh4nVqiXLTJ1MPU2nnI/35qot/L29bLWArtah3dAPl7qmoxI/fr7R8WBax/vLq

Pg0cKzNMVblGjIyl7x0De5HjrivxQevHYJLapSYZxYRAlJ2Vr/xA+X3t5ht9a5Ne/yY81PSoU70mj6fV

hwEeTWSun3yJ67SaVWrYoyrvJUADsEYDoN53GBGBAl
5to4B0HPfuA4V/h9zLNZ+vdDZCvr4d9DFEDgxv23yczYCGySGbg57Dol53ZwecebQU38pO5oJ1hNDfOl

5SJk/4+8IaJ+YcGMmqlz4qDAjQbCxoEWBXv9qNGjPGt8qJcHvsmt0ICyDrKVYbCKpRT197/nKbwB/JpX

dM5z1MB65LKr4W5sgxAx+ma5GvoFKkr3JwBAHi9LVd
xgzBx0E1+Hu5rmSebvlJV1NZ7jyt2hLY4+ZOlFSF7r0PCFgWYIaod8qsqczBmdPBvVx55/Pu2ftxsa6W

otuoZArapYYHCkgi5IGe/263CFayP1/ip5QxKlnXzPVuK2MZWCFEThC6mpKZplmvXsv0dMy/L8owXREr

YiytwYtdZHN6q0FE/Hb2YsJHqRcITj//qItagyzmFT
rPP6OUryV5CV6SXAR5wu5mD3+3kkLd90/azfJt6DzZk/Iq6jVmSh3e6b0h3EgIrHmvcZgAZvYIvSKqb8

tZT4sU7vtefqYftDFPiERSnMJVDwFkEkbltDCd5xu6HxxmOLgQDjfdWNFJZgiJF16YYuRfGTE7JJm/gn

cRkJsZ/C0394rJBUt1LqVV54Nlhgrz0qPX6iKAQ3Bp
kltKb0KXjfC8hZw+7ErNSQxmwaPrGLtrlwbC8/7ADzfCv2siTn6a0o5JmZ8Zq9IgEY0FASXpij/Sj2da

Ge5vCFJ3q08K7TWH5gSz58PIzQoQgLkOtX4XPXR4k6Ws1pGjqYO0vKE6SBmowDzuSK+ai23dynM5fZVb

xzx2WFwmC21D8+SXFgoqSsRDQEL7MueWrMlVgcOmov
IJGl/7B2yvQ1VPZCCz3Ez/V88TxyDsf+8Qu/A99MV75CY9T9AbmAn2YFONJo7+R+qa3sqKOY5fuGUFak

uhPdARVnM45ou4owkf/aS9z/jOAIWbH98ARclugykAwUYOmYib5F07ydo1BF3Zcs1bzQytEfzBTGTR8N

VcmAeb0xViX8DOWRxCipziiO5JZa1Ij4eK459fmzdG
rlMbw10u2ivZIktkHSbMSi3ckUc4FTF/P1fnuRDXtxT1z0eCE7gVSZlDC+B8aMTxm0b+XsFEZII7qJCs

Fe+sw36akG1MWI0FpizwxG1fBcQVFRbFQ0cuGk53+Uh7Kcs3UETAPk3sQy8TmBwNT0714PYUi+q283z1

KSOFq7+lgn3Q2wPzHoiBCtCCekr9hzarratKDRV+0T
pXKGEVIUa6FGzZaZg9sHemSBcAsHz8Lg2NGFdIUeSmMrb9nAkM70EN4vhbaoGJObk9OxN26bp+dIQPHR

Qq80LPkSjfK9ynHpPGhNDg6XJSzeQ/5+Uxxgi4li1NfSQiKlggs/xCDk9f01GA+RKOx5ofWutB8Nkl8b

8G60uE8YHlcmmg1Gbc6hWF6871YeTAAp7Jm8A0mGiq
M3MgU8pL85KZe+ErUULjqDFIp0ckkedawsH24Ah5QWvTSXUR1t7xpajjMXZ2rbVx+v6w2RUiFajR8HmL

QxrB9ZEebM7MV8qHFUYO9GneupxBeHJKmMIa1Otb/pzUHUZr6bdfpICgkgzjaLQn/yEZ4StPq7svuI2Q

uzpTZZiUV+3Bl/MOEPaWj02rMMOWP+5ThZu4qd4qb2
Rj4uscLTCF0X/6/0IO3I/NpCR/CrXg/hJUP+GvcDXyg4bcv1vsxp7lSWC400slAF1he1+UuTTi2TatAX

mpoLDsmX7mY6gVGM7ZqQ/uEMQeY6/tYZ22DPiieOijKM3OQWpYv9JIKXZybdCSAvKldgkaB9JEic0xZ/

7I6iQn9GwT1n293v8XsKMMLkMZYcxkcR6m8Qc+ni5G
CRPxYDCxLNrTKQlMVgc23ku6G8w21N0AUoxm/4UX1llvEyJ/0VtAUZQC22dmMBlofuTW/yiWAj/EJcze

PALMvmcWFoKh/zlUojJ5ke4sHqUoKqE088df/qyuQJ4ww4vkxBj7DcLhPA2H0ZeArdpUioRtkPaCFqyy

wAk1garnF+6JOKx74GhcTrfCZyiPVsuY5o0xCSbi+8
1p5Y5Sx9HZ7JSy73t09wdUf4lSJnIIffcCa3Izkz8ECzMlnXUk3w3bwfhUHQoZDKF805V5he5f9EbVjE

LCVLkNyNWWpNDdU/jt4JFZrEeyy2F+TSNFzK4AjDGSegMUuTclpuieacKlOxeXp5gvpRH1GtUPqHZOYL

c69+JYszetVtEgsNzX5RcBmRAXtrGn67rlO4oT3OsO
VupFmyH9N2KNWTCvqaPSoMuO3HJlUhagrIN1PbjkK6V680We/xYfZL15SFJlK4TMdi7da+A8Za2TmMpR

DRUbLGMfzXUjSlx60GaI++eFqRWpj1lvX6RkpjLHHOnSnT0SMMe+kdlzVU+Cb+nkzYwhkWVi8f4jYnY4

qyntDGRfTDZgzLLQtOs/lPc9fP55YjlIUbUhHGdhXx
mHm50sQOW2sYs90TdOSTRtcvIk55xaj2CearQzmoKRJMvkq2ptuF4p0p3EmFSUgqOgR/mah65jFJpisr

I7APZzsjDNje1VKegQCmPx5dUjoT4xl6FAezMQfwRjT9J/t7yk3mWg+3MXjCwmACF9pkjBUIYHW02Dz9

WDp8jA+SqEanuzc5/UucPLob37d73wKzE9DaWl9ZJZ
eOdQuxZq/W9O7R/h+xmjGyFb7fq0uCNjmzLOoQow/H7J1dGigvLHQG0TU7glpQGSOPIpv1+YuuJActjJ

PnN2Sqv0C0Kiynaecq7aWQuqMpzvC+8Mh8eaha/mi8mIo85ZlDjeCcImY3DclxVMR/F8BUk53GsreDkh

zylf1U4rH5a8PNeG4cI6qLgyYxowRzmaaG798KjeVM
+xkzHMm8Aiifwit8LG/vnlw17sGB8xhcP0qE6N0e22Z48ds/rEloRzGqw5wOSU36wP+5lr+Uj+7bXJAz

bRaKrrNqeytNVw8EIpD7ZcZgUH3xRhQ93gbZ82ajxQZ1DbIWjUO5Rz17WfPFgtLfcWXawKRgxv2ygH98

glEwzsQ4xR6SCduhELJ++Kzu5/EStlZi/lFyvVonC2
JHwPow56VYm/bIlfd8MFKr7ZwK1xxOKZRSMRVKrisW/rsjqIIbqTsCd+LNT3EuxFZXkeLM39Z5bwGsVv

7Mwx2FjV3+mm29KsDMMqJ1Fipclb2FIbjyXAycOMqNz1ElxcnfZPx75t+WtXfwBZEH9wEmT7XDpjS8cC

uzW+Yl+I+XlhvS1EFmplxc7sTDNi/8Ke1yujHFkXwR
ER0J+v/I0197Zu+TCt9srApei4RHgukQvmIOpWl+WMGOAK7SWeUD1SP1lfmvQncCmeVrgvuahFu8XgB+

n6E6bHuGXwAaXnAc0OU35GOyX/7zuVXKxzKy4pbflcYjPecdc1LdFeWlgDuYEUFTY0TadBoGVmocMBoF

8ll8zzrtLpnKdAtqdv4IUzGQvHKmF9lH+gYxtu+QJ7
+QLPFFbKortfcjEgt7NsJ/DlDPedSdH+0IB2xTTv1u9oP3Pt6FayvV3qPQNXQBlOy2Ut9Vt1teDRPl6r

J25w6ySUuoqhuKrqIMpVr2i2RBo5yaWlDlz5ANCjcHBR3SRsxDHbcXP3292WJBcunRabI0O8OBVDpmT5

HT78audHh/1kFhAEoQcOiks8odC9zduhiODGQbRhsH
wHjD2/TzvIajaURJdLj1wIsc+8bBk+0UZMwwirGFHwe1DM3hYn3rKQTQqArWtrWkPEve91/6fdipPhOU

F+EVtQwYHPcQa3va/y/NAx6lYm52nu19sq3bdfb8/vlcuNBoUm4JrLOI3Ml1hK/cn53kjXpdBB0G9BwJ

s6207L10Zkw+583f9l4ixEP/Z7qM4q7bpsUbzIvN0U
HrxVqUa6zxkJWtVLDTWtJh81Y9HimaIdSu9AhKTT27jgD69Lkd8eRDp+W0D4FWNiZfV/K7f64wcao3Dq

ailMCvyvRa8fgMubWMkqF4pln82oq5DR6iskn9jzcONheoYJGiO8hUIg9jzedte36ZiNxDjD0Msn6sBq

BaZxvqnmlpEDIZN4QN/ZtGeqEwREBl3hNKsnxUXxD+
ZWztjBhgWoNHyyO3KTUHEGZ7rJAlAUvvUY1LVNnkisXF6sy3tgeGnMZJDKwi446cBiuDRPCMGTnlOtXO

gKy7TrIeDKHvslyHtoNxWRNwn3ceCjvlVEzyKp7rQLGdPplwsPdfYbnKorflRJIuVfn8YV8ghROHW+hs

PDZBZp2RwL3FIlQZoutaCl3R82GzzLf+1/sBdoKnWm
SRAYP7BJPtgzVbgFC6mAPM3JnJOEyNoaVgrvmTCOiS2WZfyV209ahaoqc+x9We/X7WLQOcsH26EvVD2s

UFemKa5RGf8p4FqBRzNAxKfqKE/UBUQFSkQk7DGSseUJOzY6du8OvEwJ93imkjVW/x3/xviRJlbIcHkc

x29dMvaK8i0mi9+0LHUqTLE11Noi4pSp9hFovPJCXp
qryyHvgbZHObbRKunztyfggauszdCDzRzD5sCj/3LBE3JGpbi+8Xx8ETp9GwI27L7Ec33BcLjj2YqWtt

QmN/fTDzUlih2xxGhkIuPvGQYQJsWfnNMb3jIx4jF6kWou21t1mAfrzYBJpMpAxGvatzWt7RNGtUtNza

Z42BAwrVjvyMZRcKYk/u+lxeAdotOwBzsa+OS5SESq
gtdbrZqYJC2u2RehzsXRsKeRd7fSflVrTWJ1kGgr8wUotum9D2uegOwS8Kf3ZE4FLYcm41NN2t6AuDP+

BSL1YsYdddw1nXXz+oHOD/harbMWa1M6JgYHUIhTmeJxcQsocnPou3MAHwFtAjHap17gcfZVTG+AuHwz

CIM9X0NaU8yZzm0B3GCUuBjK+bRMZAXt3f4/b/tA1k
Y5XBMjyodeY+PTsP3n9Y5nW/ht7shAsgKR8HWjPluaA8Cz4uCF7e1ECYyu8BMVNlPZPm0mvnkFUbqtji

JxHzPJLc17Fu1L5MGehQTdnVuDcqIXr7ekXBW25Huf7HCzJ8J6/BT4lUs1wBufy+7RH4ktSY1jJ+LRpG

OqRthe7li+qi2NzfgwJZN7R7HGYFEfKRNAK99ptBjv
FrhAOtYENKznrbqMCRBHJgutgHL18m5AA87FPiiJUO4tg9YHuKV9pguppFjjKExVVR9ag6dK3m68Cmv2

toGXwyLqlMr1BMRzbF+nueHmZIMVhT6aI4fRT+huEWuKHI3BHT5IIxSmlO93cmE3BSqIsrJD1HYL4puD

5AMyj0evmK4FXm0ZyGfHuz+mb7Er4p6En/L9iQyhaR
+s7w4K9xan4AhnNccu441bwWz+jwBdqRLmHCOl6tiCmlDQw8sASbRzmRJPtk1N4XGZvnf+GdLlBRxw+H

/tDmMtZRzW8iAkByf+2wrXmkAESt6+Mzo90y1olSsvvWwaGCOw8lwgX11I3csG72YLwPCHwhchsEK4bc

KiY3DwFmqibVb1AJld6p7Z+w9gdJYXHIpfQhIjQS5l
3cm4Y/wjQ9tOa209I7pZ3AnDcDpmZiwrPyLR6r/TvmG87PGIGlH/R5qEfXxjHcx7tJqJCoIl1sZ3VvA9

+L9KU6OoJWDz5peX/TMVAlCnl6c3yupqGs4hbh9tp/gov91fy1DU5rH38CiVmiMa/ia98ozg3XF8AUpe

8/aFVnOT7kVtIn3K2ci05WiZcltPQJ8FJ2qY7FDKrv
J2cPHjWxxEhc6XQM7bbSotkIBs10BobdPuksocL38Mth5WApuPIvBxz85LUTo2hvputf0HtH+zaXvzvm

9YloqDpfcNrPxFe5N77ACAEw430GU0q/0mhKzsHMMNDcOjVwstct8bFFsQjT+5YgyNnJ1hJK8ByuFkBd

o2zORJKF3I0XrnuRMBXG8vF+yik1aDDX8DnY9OeOtt
oNl/unbfwvfW868P9n/0n34gTIGHcymlqJZzWmLpSTFTxhlZ8xnnRpWRK6zT1FXK1zvxKeiojTQNGjy+

dzThLFRVWcux5seCgBYyO7q/ibnlOQCiLlg5+S7avwmwbObVgG839T9VbPKSigRvp9QBenakwdBrDDRy

puDUrzYZrJTGkhPsPtTBG+638Z0pcpnYjsikzStaaJ
LmxBLCR3h69aC/28Txu87zWbHIWykAiUuLRvswylX2MoGP3b4aNeRPqW5QWvaDIN0MBc5N6RkU1dTrsK

f9yzz86UuL0Fp4kUg8ISsRZbuFaT6y48xWP4ClFIrX9BdrJOcrp3qkKkzV/WGl4nIfsUMR5UfgFJxRMG

MnWRVrC+qrFGUtreg7C49McIx03l0PH04qSn1R3BJL
cbMHCwmMCmKnY9pYboGp5MTYb6uCZEY4BgZgTEXi+g0WgYVbIByMLknalg5HjIzp4w93I6TWYf1YPNN3

JJ9EVz2zzY/zxsQHwv43EgbGMqf9eELmUB/u7dcTj2NQ8ChTeI2s5EWOtApmxi9B3KEhb5t+/7r/LHeX

cMzIjqU2Q9mexh+tYnA2/BEQZqKaeVLQRBlzzfUCOz
VGxXunuaNUuIz59uPiiUj15pbQ7jlR5vFs47nTId706FBhNwT/5ShxAT7mNNspW6lePja9+f66z3o4g6

PAjOvWpVyy5ezobh6zSht0lwIj+i9Qm/OI1vUYrL4V7gErchy+BIOHmmasaXozc6zQyyUMPvOG1GnxNa

WiBCcjwGsDA9K9aTveXtc/H+BZOBv1K+zFEapIU1HX
0Q4lcqYniymNIa+YC4ZqLsgFKpU+gkCUs0LemlKpXYeLoOyMvSgWkO5OD7Xtt3EvrJf+C6O6FzKM+d4R

8sw2QwB32Umz7VZbUpRpyVHllNEjvo/iW6wEI/Ov+L2kKbCR4syLJ+xZfuNlth4tYSTunfafvj594oXJ

NvrikXHt6Z/N8j53orK2OWXL/iQvFW60njvxSVEfs4
X7MuL5suXgAB2ZfMfipQfxi++Ksx0TzPjgPF+i9hRNcKTaKEbqya0AfZH5xPoMxJ1O2OPXe5685o+Tgr

uDdMAXmZ0HZU23YJSUybEwpU39+stjc3nkgHBLrTQWiKZj+qL4LFN3at806cK5+sr4MOjaMwmbBb4UZG

g75mHlFUKlvwlceVXvL6vzFoOUowyc4UMFsXwqadf7
T2tLJNKDrYaAjnVUO1o5zf7QaCrFBwHcYayE40cEh0HcIT+wOUrKw3hWRpTYhQtxICv4D2ZLFAtssLAU

SDMpO7f0oGLSm2KmV4n5t3HvXUlaMHWFv3fGu14HTmIJ9C/VP6meP9z9nngjLLlQz6Ckgm+TV5+gKj/B

+DKZpgVwxAjv2apxCcdiVM6A1+0Scp4QLhFRhaKcIx
2HLWdfiXxKVtIiQRxKuL67BFM303Ocuqz8D4McM4zGjojQAUFI6SBrm9/4zHnqrmorHY94HOkuJx+i2X

5k8uNcr6setZ1ryrRWKXlv2gIOpJe/AkwYKkEDd+R+9A5Z1QrizPGlTCSy+9mnEFM+B3NkwwJ13TgzpP

rL5ORro6GpYippN9obd0IZueU5PXmTGhJ4C/W5WeOH
zCAcxSprX7nIjp1kCxqVdKbJv/KTmr9biZ/yhbVT8pTRqkMjEA0eVXEEHzCGdMMFj09S+XU4nmroK7zv

Ze/3ZZbCrr+eouISoi4lFWxwnyDJHXYD5V9eBGIMyY6Bf5CwKyJ3wUMz++y7OD00qi/1Tr4cYkG66axH

mgFOsnLCpUyt8d+YOIvziO6koh47deRaY7UgMsp2R0
ywrsGjeZlZpCg+KVYv42QVyU43sjk/bZTz7brOLyqrNH6024Iy/fIjR7x+LlI6gpOIBe5fpxFS3YpnF3

W35x1v+GRtPwJu7gmTIJPn9EZ4vuPn2QH4a+gFeG9Rq+ragZiW0FdYZs45gh0GbSc2En6cRVpBpIQfxX

GqzU9U/PTjXnF+t/yNFwo0S7vCMLodh2tGtpsHKUHQ
56H90wjhch8XuxRXLclR1oMn3RUJcDnT078fpZnA8wRg1GhpDkoOoZAZ9x4jAhriNhWw63PF2Or2/p+e

6zZbxZE+A13oDnPxIy6pJ3MnuPxcFGaBAjzipJ0sU8U+x/t59io1ziaXfezwgeqLRCFECkGofv9Z1+BI

kpkrI5TO+UUEp6tyDrCM3tOyBq40vAkJebRAkzqdD9
qSpR3mPD8mtglOJn/vQxj/KBVGXvUgqLDKCRxm67I6bvEly53W56E6DY9CpHsoX10+RX+e4qdJeZ7Tkf

RCWHIJiwNbtrBg8u/CEyOdAulHQGR/ofNnOuVxA375lV7tAY8B4K7hSfSNG5Qbf7cfGW4049LGfyhh9x

w7KO5Muoe3T4auBdbJQEto4az98jTrT7erJc0xzU2/
s1UIO/v7q1L8seJtWE/d9OERgyq+NgFFKsibVs9V078T9yraxBu+sNOo2B5MWYvyail+QH9uARB2JyQ9

4C7RpR68+rm2kbLNsdh/8zETQWgX0EQaZlF4gokepintcERtthWaadxbfl0rU4+JOIi/SNHiu/dt0inm

GzW4eLZfN+QDTs1DeZQokp8ms95r/lw0m6u5sOarwY
gf0I+7p1jteBEpdHITZSiQq1bsLNkKL0JS0yxtFAedjCI3c7Y4I/MklabMnhcl2E0wplbIUf3KDxYIt4

bRrPMF3ZN3LxlwKEaAaYc3RI+cOxifV+0JCfOVkQFNHRtQJEEOd/MO6Tx49VoCI0gaiR0pYcbBtlroO5

UBbvwi8E8y+46aEPXdH4/6jeNpBNtbfP/cLKDOzk1S
hGiunxWiQr+5E+vP5AMd6mQGN6WOvjVQkhMAwrV5n66xP+7RI8pKDZl705i7ygFEP1lunVWIbp/NQxbc

WL69JI7Uv6VEUA/XpWS37cWTdjU6hWicEW64/bfHlcBkniZBl11B9v2TjhEfk+HlAMbZafFFo6tXh+J6

oa79Dd7+0wo/1/sR40hMAq7d4PAgm6rV1CNxelh2CV
jOiRCuluwl8r8J/I7Zp6kU1YtSRl/j12/bGGwCCeGZ6Hy5/GRp4x9cajAA6O85UdjhRmQUHwm3nXRxmo

1G6TP1+NgA0xZmP2GXG+uZvlVI3XJG9d26W/woYubaCMNNU6BnWEYR3B6/0oZlvCiPVnYwFVgRFe59/B

w15OUy6sJP8z4lq+dmLw60YUlL/MrRfXwo22KrMStK
Z1YYLmT7VbWNtC4AjT9lBSyGkd8wTpb3Ezy5J4C8Ss6kAVmwt8/yMQc0Lu2m7a0rEjfWs4CZHs5w3h/z

PcbpLybWyHaC4Duy3jCzgLtgMGP5+CLkp08jQdGBk/dyr6FbDSM/DHsZsKbCfaCPASsdx6kW/YclLj+I

pZ4bUUJmzmzP7Y5avP8YnjPvQajLSctxg+sXjwBgWW
uCsunxmgZCYw6XzZk5ohLXBXpUgPZiOzUsit80DDwnWAYVa3pQHf4rrofkQU3mGYvx6URar6Q6+v8n60

xrdbWrAw55wQ7AXGj+UgL0GWdz21YCEWLcNmBJtKLk0C59qkUSWTfLFsZq3C1iIL9zCE2f+Z13U7zWaM

rfFhhW09KpAiUFdZTruPZ6qAhvuRcHLKXsU0q5W3PA
9HwBsqFP/JDwPqxZBVrtdy6UQVbG+9mgszbFEH6CvROoSiaLq+vxpbksYjIHmuDEvOW9Sf/RsvGgFbQp

hoydzUq+IN8U12+AJkkEOQacjlrfYnMH41P9wP3CI2pzDWhHxJof+pq/9YyvQ54R3OMS02McCq1+lSD2

NbuFxvW1pLPXZAfGLA43yYBXpKYtEAVO723NCniK5X
TUrfss2yPh39aNoPiqtOZ0V9mhNtVX+e2QUeLubY7qlUQvBSS0g99t12VSFfKqee/eOYO1EqoFTvgUJm

n0zzU0Z//mv+au32ZxVLAyEqn3AK3ovV0r+GYJVOCXkOplWOCyQosV23OdzK41ClS8PA2FVqk8C9E4v6

lHuBVFbCm93P3DN1oJbwV7jP+zkmZQBqVeLLK0245g
/KvxHA+tChwHWze1UB+zbot/teZWxj9phmEpgVNGNITMQnveuWHckRFR9UidxilObozrrYvmDgcduZHL

C0zWzSK3GbV0FdNZiXgpZe0yrjYKn1TTfkKUtjnql9R60ZbosSlO8PxjTrTANKIBMtObYACFimWiO80e

43PfsdT1+SB466ESNP75gTpxNX40P9V2EnyS9bmrwJ
TA9lHgYyU95PLIKiAcxgslD/y1c1bW6LISwn4fbiCnc2JkuR/N+RSzuKXRnFNaH3ng5KPCm06rfRc+K0

ZcqNrEkW4LdjuDRlsO1RGAMJyM2CCvrHuNX0KjwYCB6VdDVJ6TTsruUYWrK0PnginIh0sXKAEYlhJuEh

vv2bX0637UZF7PG1KfnB9shvQvVf0bzfWS29ioabh9
DWB/OneFg9seGxYVuj3zTyvkKIgSQ1idFfaxYyWu45HmOKBgaGIOxLZWaoSnywa6tcmJ2tSeTHvhkIY4

JEjKTKwziQYUo6PyKSN2Zyi80owxYC3tzjkQJwEQzFW0Jx5ozP/H+QbwX9jUPYQv1/FeyWJmZ0B6ct80

GGGLHljEmr2r6hm3kqqWSRoISx+LrC2T7m33bTSQxw
AuyekbAhEcYTtXPgjrb5n8O4Z/qaNwl0OtB/1M/JXd0xeVz2Vzksb+5BeUywM+0fCNNpWbrC7xQBtT0F

ZwpTZQigmbXYOHd5MV3iSGwiS/ptMqHW32HuIzHuVAZ8kdmhkK91jvURaL4P+tjl8j2j0NGl/qb6w+Z9

2c6xOafP3Q6xH1H+kTGSKrqG887vVHV5b+qVl9rfJV
AnINBnBnazUDnJD6qBtdfQYHPnQHX5cn07lgr8HtX49Vm4us+J+Rr/g/Ii9i+LDjKXr3gS04YLO80HhV

5x0q97FHRTjA2R1BUuB9d5rgVlrDCYw+uBwRFvJhPm32aLuYt2SMBE2Loh0mH+y95MKzCHQ1yiTF/4lm

mxjBVG2UjnRQVT65GYzZ5sEI8M3zrvGi91y5DCr83d
37elVKQaPpIg33RsufonnVgUBkHSLy4GZlP0wVtQBYHO5qyfbrhCUxUFVXVO1MWJr9mZjwSL8fgCnY5K

RgpOQsw7j19doDcWauiM1Oh3MzvtGGf76Hm6feYJbu2YBAPsCLL5OcD+YYEHVDUWvsOwqUgxC+Zhc99p

Uw73zTLgCD1Rbi8YgdhjcltkKRGFE2IuG1dgtXqfQm
rVaKI3OfZCTj7Xt9OyXlYOVuQGu2gWUODrXCADhhwCC38g+lGeagFFArCNjSTFetXSMWyOSVYDfhQgTH

jouR4lk4OwFyXRUjIgyn/rbqMH807W8c4KYD90c2lK+hU6QYaaYK4FoJ0tB+2hOvVscjsE74HLuR413H

Gy44Z3kIBEJqjAEsRuLChUtTnh4wbrYDbu6afyIreD
+aGOpP+gxE4o4M5WiXXG36tNA5JHlhgDarYQ6MYre9FnEGQSUVy4GVQHXci2gcDG2cxfc88M5Iedus0T

J8Ddb4p2rZj2OcdxbXYDR8X1DEy1i3PXGRMGGlt7hBvgWDky8h0FeqSCv0ETaupxRpcPdxrVOPkMJ6w3

nUWaIycCuuOz+OSufqOTTZEI7ruI/kGnbUjJpiMxrS
BHy4Dr1xXghRMnyEEIxsBrVE6Ttpv0nXNilVr8CNKge50FkwP3acIqDQVr9Xg4Fxfhu9Byf7Ch5ZsZnp

npmboEEaxppvjAEkxqEmD/ornCndpz9URzK0krH5wpQTDhteC7G5rsLqDEJf6ddAnybCrjxjW6zRwiKm

cgKU9gOPEmabL4Awms5wwOAXT2NKWX/k4tAa5fd4m8
M8OUSwCkgf+CA/nvKjuQHapkA/3g1ULTWUaMi1OeVMMDb3eDXMnwr7/bsFevdLzjSAHfi1Z0GS2vmPVT

sFqv4v8B8pVs8rkBxB7DJyootl/TDKgnbuyJLjIeyEVRqOFwfgF+g+Zc0x5Kpr1FuREu128rXL47boZk

o7Td+vXuH7lhg8AtUx8J8Gs1Sw1iW/sHuq7mREHrFD
11GD3k4+SGi/3vziYpqmEeDqurkcH68qLSoXqw6hP9uMWAzQ324TprPLn9UfCtc35OM7NYxf1HWYuXGr

S2/VAzbnnd0xah2iWiepWBTcsAQ4NMi2fq2OB57UAaLHnHWKtdgOboGwQ7xP7cn+lGDVmH8Eg5+C2ZF8

XboeEoSavS9AUTpd6W78mpk8niPa14uRfANnsiKKIJ
ADatsK6T1s4IwJWz4+/L1EjAIzzhFaNkH4JKyKZlsncVRoTnQ44I06PyWJGUWqak74mP5hdiQn0fZkT9

7SYD17X5VlKotyEQ7zfzwalxpLMEG6rUSRPgZzHGfdC6qWvwQ8cum2E6FvtB8dJrjnFoK/zPvbQSes1C

X+HdGCwi0NpjdrCP+h7rFXYDOsjpIjBZDbEqUj44+4
Vu3UT5/DM59iqlpG234J82CzT2igeojJ0yBWIlrMF13CHCu5AxzJ28l4Ea/QGgKKIOZ8xztYLbvvKyKE

zPVx3iVJ7F1WaDhOIXPYE13j+voFvkoe9awF+masUXC8VswwXqA76Dyw2VPvlyjZT49VflBZtfMYuUjV

TqgVoH770RgHhO10YbLYO/+T4KlxfdEVYl0dMbxTzx
4g72kILxVtkeOcPcEdCilYExOtOuxoEHPfukjt3hzqu6876aMniEldcL4qWNyeo/9bxUlRBDyUAR43cc

PKB+jq8He0O1+kpllIpFoM8AOnvJh7/vuKnbbaN65ImC1xEIAcubE/gUSP+LOeyrNE+4DWT1XpaoTrav

0M05Ko0nm1WsRBXO2V2Ri7MgFfilkrIR48O9rpc2hi
5wbN4sgJynsbXAJaPulMwWMgd4qKM84Capw3CjIMfWA/u1/v1nlhfpsfC4fUiXCQ1ToOURrS9QdYxkxG

aovjOFx9MTloa4sO2HM1ARj6CjQAezwhAqJlyR3wKA1639KgKPce1AhHDG7w0ZMFCXcpj+NTyMvWhsxX

0Mt8EPcf1041sf8bq5zpKAS6FYk0+jVZ71Ys2UKgG5
QODn10KYuIcfABf8G6qT0JNIMURw8yp9EQkWqOKz7B2UVoAJjrDH2T9TDAuWlYMnT8luu36tyE2zEADg

hjcrj7k+bpPFawPh5rSu7ogZdi9Ld7yYBE5YMK3ShIfaA60M5JUD9DRhSQQ5JVPyJN6qcoVxpfdbROZ2

CH90yxCkaeL6/fbQxnvbG99+BK4GlGnh1pyjOPhtG/
OUOcYDVxarKGD/YzoMJDC8lKTWUSbe1kvx/sJ9Gp5T4ufyibZJMy0Bs/GyLs5PVnC/MQji52y89fWNOj

1dETUYv43ZgYIqgpTCduiNwl9b5dlz0mjF5ojUlbUTVgLITCoT74FEPk2ceucZBZrsYY8cK6yDP1pLZ1

hpU7ofRFyBd4gvCHpU5slOUQ9Fg/l+6/CREHJMyuBp
UgU07LEY6neK4lcoyDqDWu1j/Tp5+WJNkB9VJNHZkgsQOTIjiE9ulL1nv6wJhYxT7CU8VXZY2+Qg60/1

oKGK9QFY3MaQKxmT8pDxggXTU6A98nn8fFnbcviqVy7E+uXxlsdRkEgAN35nVTp6Gb8HkkH+cmNii1m6

/h72Y0t/d9Gu6/DrVNDY3+F8q32s+qVQYwAfDRQrQH
dxPeRYUQGJDb1SuHtS95ZowH3lEeDRE+1cgKV6HJFUQZBvzqqWNYZCVnC17keqSmp+Iz2Cw5X+mappnS

Gn4eaGKb+b6dubkU7TFxMA9vX+J6EoyVsUIAuvF1E07OUmHFgEwoSQyGV1P+/EnYfm4hxeKqxkWVsLTb

9s9puPJz8KYQCaiImHYCcvGGChgY0kE1C+OZ6AYKZR
SlMZTaXy/p/S1y+YXajoyBxolU8a7q4KGlQ+r9BcVX2GYHsa/e5/hXFaaH50GnamkGTnH/JSfDWgUF+Y

F2Jc8+HGSxHNzWt9i//zFzvMX6AnlqEx0iZPRgarbOJt6ZjZMH2soeukVRE0DUiZ2CjNWLHfEISkMBBf

0qa34E08YTJu9TBTGzhG7oRArA/ODbsNU90j/1oQJU
ub0Da62XPs5/7AGRMH8Cq8Ydaf38n0Z9qKyALNJwUyVrZ7la0IkDq0d/ZN9YPvGkynR5qPD9iGgxP2yW

xmSYL4Ow+hWCvY+uIZ6JUKkxYa33MnjhPgk3YlmF7ptnI2Vytmg8RHu7w5PhOOE4kSwGXos2g6MFrquS

Ao8lfkMGUj3LxpC6D0fZBJLdm57fZboXxaiK7RKmvL
MWBJrW6wdA7YnQDQifp+Ed+kEEQEoGGmfCjjirt/z/UQ4BNcUIIq7qDj3zxyjNNf+Xy0fShVpM2/eTvA

ka4kvtL3k1P/aNUz2luBiz+ht5TNttmermO81O68pSzaDLlI6eoN74SQEw/fH9YE+KS3PHIxiGJqXK4E

en2f5mlzxAsGToCU2CllEFub7iLLetE+D5vhRQ/gt/
FsFOueQ8Ol0to4HtiiiipZ+c1AYjaW0w4XRIktgzziPgBQOwWQWbJ4hfKK8ZQ4f4NpnbkyCx0MlNMTp5

8T5j1vGxk2F6d6gbQKQqx/P+wfNs1JDV0bnCx15gvcWsftOxXgZLtHhPfv+GA4+ActRygIdL38cF/hHA

x73mkNA4/RQpj0l2eM6C52b3+foI0AxRbbMNzdJcEO
xIi+t24ksLI9jb04yitCBNbQ3lDZlUkqSYzKFF7x4WgkfiURBsDSYaYl2HGFkjmIY2976oeZoldPRc5R

5/3189i3VfxyKQYBee4REvjli3zx5RnGJzjFT1VZK0jY7ONKD2DdbRQxXOLYvuQsNbzuJJfK4EFkqoJl

UM8YDIs2XA0esC7FjgHZKwYdGsPHGS/aG7uyq/TxZ/
ar68jaO7xnY6v+Y4SIhlD0iVLYgTqLzlPOA1AHkS/8mfaJAg4kdmg6xbrA3PHq1eWLU890IDWw45iZjC

VhzDKz+Iq8gc0fAtq0mLmyLhpAs1xdrgIPCFvPAwYy9DBtfadWbs3ihKjUbXTZnuwSeUpfYGCTvDGG36

vGd861Z9fZNo0bd5cmKFtTt9aYLPX4zMorpDlR4oXz
Hl8/1d04hKPRCENkMvRn+Acuu/H9vyPGzYSurp55yVm/hSunExNYJV2ckfZA1QmjwRCi3ZS6Pjc1hTqu

4milF0CYVw2cQ2eYAFE2U/od4bBOqYVT65mP1ovIUBZUgbKo+r2EiKx/PCc1n86NGzBUSG47Y+Oz9G74

scZKx/fR6E7TA/4urQaVpkjWkkxsHS/E/+5mMqOGPA
nEd4SLFEQmO6qu+tfinQpIYzArq5f9bpOvMOMWbIUxyBshIE+SvMBzsM76s++5NNdMe0KZCzZImKALyz

HBSxfUfFzMS9D1Jm94yzBC9jCJu0UpMjGE/5NO/bTeyP+XTUaa4Lt+t5F+ChM87D5NOJ7vMHTTrCZPyb

6jN8ZiKv9TMQX2Sl9HBSDCUc05CSn5FbaJE8wA/F/d
coOZ5fQfnffJ49klQMOjVDHn//Rh+/d7SnrdAIfVsu/EExL7kqGv7kKtmc6ZsL3pZGs+kI/odr6WtKKj

tasi3z4p3jVINzt5P1r9eNOv7ZUk4lv4oX3TECuwPDtDv5Q3uR7hNJM9no5e7JLmpkW0ZmWva/leLLP8

A/X+W9I1BHdh877rynthxW03l5Cd48P29pDctMIezE
IjIkJdAebeL0MDPnZL39gOd9TulpwDcmoRD9dFZbTjAE3BeE55vAaExo+frmvwiXD22/ov/LLhhw0TKs

rfURnmr1taLAjc9j10QbR91yJnE1KfcR48qFpRCQlCUKVZIxqgu1BRX9IOW4b0bMAdQm7nsRIScnMuTd

aG6s6XuTHyJ+Ca0YDNCsxeAld8dE1dvroSMo2YeLwy
ReNhrcA6ULHvWvQ0c/azZn/5/ZXNSKcym+BpdrQg56mmQw7LmHhMUsV0LIr2v6Ql/tC45iefrRHREQ/F

tIhzj9cI2ypMAA7vFxxgOMfonNQELSMjxRQSjrKmRQis6UqIu7Z/yg75FOaDyQNIj0GHuhoEn5vonY6D

QXxoL1tcx4NlhJ7Ezg949Ij464IT92VrYjMYGbQqdf
TGWJNjERf/onDV+zmNKJnjcfj0Fg7uks9+F+F8RIRdj+pyDcyzFa+rsYcxvAwHuBNjwJoRM2766zRdLO

XMMCXXITWbLRp0y823K94Ah+dkvaheRo8t0uXxqZc8VPL+Wr/wVLw5Gpv0zMIhgLqkx38DDI9w3iw/bQ

524oevL6dSlRTHnb5PU0uFptUUpJyCZI2V9TLvRDyz
pke0Oyywr1mcUaXLxePDDioZNNru7K9SINaFQE0sZQGoQUoLWqqjyf+Z2CbGaGjkFF/bF6iUHEUEPDKR

ZntgYiKKzK+pgFZAW9h66lyZoFN52YTwPW7cQR3+6MoNq7J3Vj3sMymSzv6vr7MB8zuU+MBzDMwRzkl9

/It7+dcYDDKguDwFpCGlGUYQYghl4iAEe7zRBKuVyT
v8nKmssy8++D7YsaHwL0pqNlEST6hhBvQtbj1MqIPbaEu0ZQc8Cy6D7lV+4MuyveYjVFEV59ZPu82qFK

V6uFSu3uqBCNzc/8w+CfgwxPZZbYxTreXo7c6FrF0F/IRfdeEgFHiJ1NBGx2nKfh4Dy0AXSqvl6ZBK1y

0JJ7LZGi0Rmhq/KO87/f2sqFo0O2mGuhtHSTGHoTe1
glQrZZi2UvRrEZzv0kT9W8EChDQaY3f5UA+xuB8CW/8iTgSJhudBSr6CwBdwDObxFO7DzA0hChdg4tpH

moBumOWWcK0CsiYTPWlIFS2C9pBW9BL7b5LM3l16jZJAWwlLnGCmbEA/HXv2/7r8gqB2ZYNPcojGnZVM

+ke+DNvhmqSzRcMqxa9VV9bapb+S0uB7k0/Zl/Okcy
E3YcSogxkFSCrk6XvvgPCo06Ks8Z4LsUHLasLHbehZM2tG65pTxCT4NFdU0/s5lhNtp3sZqMc5PqPX58

nG/ix3JfincjEXljoSUmTTvjpcWEFdKum2J1VH2rIUktoHfv3jw/yqfzWOnY4KnBoSxvXBiS+4tsVDAS

gA78Xhbk/RxBaC9gdxYPn/tHSmoFrOO3nw00rmbsCI
6L1jU1a13/D6n/auPlaZkkLxZB2xCdyTWYjA17uBJT//PJxI1Ryax4Dd/xywfzjO4ttq2Rg+0m4gGQqv

OAn1t+J8+XMLPGdpOqIOEYzmIUglxs7VjwS52bhdnCiyYP0RWWZ9RYcWDzsD8VaUlrgLWOAlFpLPKQHm

L4K6Qj03+r+8i66QMzRW4gpK/vCFUejSk9YSl7MPvN
jbl31BfsFyEXv187eXInEDM89zc1SGHkgy+bUQyVL1t0dCeYycxjzMA+oY60f9B2Yjkm8hbzQLpQzEF0

YBlBBfGSVv+A9FGw6dSAjWN/FaaYdp9IOiofGltcQORL0DBDCq6BYDC1kFjOf451dtW6BRWffJatv4yS

jm4tYoLyO5DSQXcZom3n/ieRbCj4PQCxSAF6QfgEPt
YzsNT1oUmo+gCCHQRpd5RH8rVwmIRSkqmx2WeG0gk+tYiiM3Tqcslgj2Axfwg/InZjPDnvbJaIohvwTA

hsj+CfNpOux5PmpGMttc0+U4npIAYunUEuAWULkWsK9/ob72SwE2SS1kVdYLvIvXsxI0IscUiCY/4SAa

I/iszo7l8dk1auBYhsnQBui3qWXIGowD41KHGZcCV0
TMzjKXt2vm6XVKyZaJuX7G3sQuxZe1Ak3veTzdcTUSjJnJ+HD1gC36da7buhiI2MdPssNX6m0spr2jCi

dTDkOyuWj73psKG+ldOhZFXjtDAdubnkBlUclt01AdkvV0+1DCa2nFaqvoTVL9sx1krp2hhtIA4elCQv

K2S41qKmqqR2mp9zIMIvdkHQh8ywz9rxJnHNYHgoyw
oU/vF5alk4N947XBGMknspq5rF1ZuGvwQJzQm3i+vu3nSzx3HWCd1lTfMWeQArfz3cxmq/LVmaxDq/1m

2arHUIav3ImKd1n5r1XTpnnYsb7rgEiFKxkFx+CSOeJxi8kOXT+lg74gm/Le/N/iR1Xr6AqcsNyyNp7C

EogEFRngLI5NCAIF8EhSJrHwYP76KgooPES47jQEfc
8rubE4mrYi5VB8AXDXAY7+M/eJ8Ub0EgggJBvCEVKyPiH+80kJuDD7pTeo6g9cLArlHIgzW/uvdzEuxQ

RqamsvIJq7fSzMxaLvHdj0/F6tvO1UG3Lv+Lj6h75xdMdGaSUE3cjn+VpO7tjOcXFrC60igZLD6dFzJw

3igoH15qLuZJ5PyEfpxJRKl6ImMwj2Cs5Qdf6RkGz0
6KcqktK6zvfwvCVmM/0i+9WxTI6qb9AHBitjRO5L/qG+uUD1zWG5KLH2qo6/bpTnQPAlgVm2QqRIPRbW

1RsvQOJuk8cs/Shj1FC+I2c/f3Bkx1oS+4s+7pd8bEGrPN4fwrbsIVFKVm+MfjGzK3Ej9GPga+Yntv0b

cHS9CXO0xqHMzJM6HZT93Q5nTlVZAR5fZFR1XcbOYM
LTKHOw7i64/KtDJZf3jb28y4u812gHlPGa8nj8YWIkxLJQvy/EVKwRrKierTMOXG+MxZtdsEw+3zdyoD

ZB/ke/9/+iGe/0k42p3MGtWXhu65TKT9/Zd/5W2RXHD0rsfrPvl2Oyk/klLJmJt3OJFzkfw+WW32Trpe

5Wog2QSPjjm0EIVTfa1KCseck/B4ASjnkYepp6M0go
pEpb3scGYTJyo1NpVXfvTpjVu8R0Xcw0hoUKO5yDN+NofIVNfXifbI+5EYkoM2f9vts7fw4/vTzvnVQN

d6z41/m/x6aEWWXozTR4kNIQb3VTYYaI4Tf2+ooDP/iDhoKpMeL31J6CWstgbwft8riBLy5ozKj2yy5D

zemjvVI8hmZSiplhY/KBwuHebf2gWw4I1682u+mbul
QzEGCLrQ8e9w2LH59/TjftpYAco+RFVGPl4Ju7BtsGUL/CjN7h15N4znq6SEhARpFN3XEx/15UgvpYJi

578tiZBjIqZrfbPjWIxZQK5zHJ1eQ0aDsawzvu5HCvNCIWzMLCcyfAjUg66J1WqGi77ackiP2UpVkITu

MtJ0uGBttbx4A7sF5n4Qi4GOo7V3FvdbiCB03OTBZW
7/rI+aFh6sEbuixcO+W/2r82zNHfggM7nPsUgJ2C6gSyzzVReaRqg7W6yZLc4YpEgZzIAKbMuzeSKh7B

qJRF7cLBBNMa7kfHg/oudww4Ks6vh6AgmPXoRv98RDhn+X/OM1TU181dGn+fcjwcXITqcvqnYrXoUvWt

FAoArr4wjFM+dYYOHTHuoxq8MqtfOIIsxDUQa7D/Yl
ApJQCs/Kev400c4k3ZZtZvKXytu6xh8VRTrXHpJCW+qYW/liQ2x2xkRlutrPXJD80+B2h6TE7Sr4dXty

wsXTEB/m/qkfcTAIZz368lMiglTnGS5tpzobPViVz/WVj0ocZCFS37vWj/K9X6EpJGSv7ZNctgRNePKn

TDglprm6zpRDb++7x0xU6Bs/hA3nmon2ReWp/wM
aL1oi6u6Ca2ao9bhw8GQYCj5dpl955PKXP4b9f598hQtSA8yowWQU+RUQUQzTpNLgTnXN/9nmThKO3VZ

ng9xUEzy/TsVuUh1ro7fBA84c/E9crRIGc26i3tclMIzkJcHZTfU09rFcXxovg+jYscNAyZlINQIzLbZ

P0No60SyZThD8WReLkKpnh2ogjuCUwskheE5jK2RS6
zzws8TsHVQgq//XMEl3bqgkGHM46KaOfQPDJg68Tm3xVW3T+T3JwLYeOyrRQntxreT9y8u18okupe3rp

z46fnC+MRONz2Ysk04bH/eS72ZOOBux27cthpGeFMS6cP/n4XURM93+RY7y8ls6UDWe6BhncPYk69Kjt

YRrqvWrqmaEAxX3xjWkunEcwn/H+hFlZNBUP5Iz5Rq
0F3084m8NDeNG2UeVh32vKyIiMrR1i6zXcQ0uZIn/auq7Syokv+x1iTOLFk/9DcRXj6XOvRYdrJPa8qE

e2fuNGEd7f5yyAiBfMUlwbnFqCd/fTGXWINQg2AGFO87vcNBePpvZuhBqAwxsbyd3uS5nyDSUa/JS5nS

rdqFzplPunV4HMdquw2IZn9TudPmpnenQicfq6QZG2
ZMxqsByrzx0+Jvvb7/j722wqP8DV2KsUD0z+pmwRMZ4DDya6CLNWlT+JvduMDuLvkWltr0UcwtXX/poD

Sdm58VsRx0xC+bz91QgxV4Wz5TDKAzVG5C9TZiJgGdWFdM2+67lwvqu1n0cfZAXpC1j+3Mw/m1K0t4L5

Ff5Q6w4eG90oW0+34iTkO3VgXDo79TlIeCczeITvyM
Dkovg+0YGhGuSYwilSoQagHa86L/JTp9d6V9osvYiDu9gWTQgTKE5cMRUlgp0u51pvzVBYCcPmhH6hOG

F+W/WNeUpRnBPCqf67RvC0bOXAR26fODiyrilQs4oIvQaPMQUW4iOLzFCQtbSBnJV5joY89dE5V5Kixi

U0QOMYk4Qy1mzuIuLB8B85NZy1a9CTpPe5bSyzetqw
mwzEhCa+9ipSYAa5b2DvoTCwtjNIiuHGywKgG7TZuAkPRkbabQuVkSF2/jKFxq29u3Y5N0Px1eQJsBp7

V7NAawikiBGSMdteimHUWUdCnnIPmco2rNlG8yygfUtBfo8taWScabCPf/HBoDECYbXoQcBGWDID3I+N

rhqExoVe8pyr0RSUhE2BHWE7s/IjAx1F8SZ6CicBKU
7I8PG11AssjotZTRBBiSStZZyIB922/7lKrBp1Q3Vi1qG1FBzk0nzX5z/8Dxd63ztBkd1JvLbeFLDZda

x3Je0iJ7iecOnHUwAHMJ4JXp0kwsTsKxXWD8PrhMpvkheg7J3/S/5LisnqfWE5pYXaDf08JU3rqF4g9V

kXWWNvWuxdfyV+IshVQNuDPIGnPsk4JyWEhlB5GCUP
ygiEchB6lE/v2MpGO+4zaL/82zlSNYk7tyCb397svKy2cSajQQk8qZ4pcewAFcHSjoD1QbT19YSUrWZX

nasQz5S2ruXNzwX9Fsoznrp+q2e+a5ezhUbHuaUn65aCv43HZGm5/fTzOu1y8jIN3g/ypeZpdSnx0WXH

gWhqbU0n7BR9uGvH+HLQHwQnR4SQOy2lTYd4+lXuLb
DDKSQIy8oK0wfVPQ82dFghG/eoKWrWVwZUphWJ/+WnmDGhIp86bP1eAW0KVKhIDCrASDIU5zqVWmdRBx

G+VrbNBk59eL9SUpD9+tXq1Y8xOf5SZoPL0oT2trCUfi+zwcHc6UHJ8mTka06oup1FvA1y4ndGZviB17

RdzCjftN0gNJotOSwFNG6UFE0AROvqDO5HwVnmQpT4
zRyuKE9HEncPlxrhVKewSVl6p7X43WmjcSTekBYajHArD1pwV5ZlhHuF1SbOHeszy6b+UGCjY/T8gAUV

svqq/EzGyZC72UwF/vWzNoIb5lzgjJaOQdw+66z5xh6OiWQaRprhQz0lgI8VeRXxINNAIMLN7VPzD9vH

3ViaYObzeimEh2jaEWrpn3QfTxILF4SrXxveij1aOy
t9URAfvD04hq93/vFAvnNtHt2W1tZX/n1AZFweFZJzS38G4Lx8UcPvcDikN6+vqOzhtV/yf3aA9bxHi+

XMQ4ump88go/7AYnuGCTfi6+/tQ5Ryl16QjlWge11hWext5ZFFTlzNM3jGFgGQyPg9E3Aj6IOwFK0rVk

CnrO05WCSqP6Be/7rAwJg9ygWVSLcfg/LZCJ3YVPgP
nOnmNFBzgWyuuKS+PjE33qvUkvuyvB0arbUv138biDoHjFFobMX06AqP/QAEj2dN2EpNvezZjsdlB4sv

kgzgFWpzf7XfHtSVCh2nOWh7Wl/BDBbpM2KKW+wRcPjexRRf+rVfo+IDHS6jhTywji+WPVamqVqIyQlI

bh/66rkRgLNkamW7OLZu9VkIklctLe09tOLwaW7Xin
WCRJy4GwQunV16O0ltI5HNv1AuOz3YFidJ9AzjsP8flUl02dU+ptGYPMQiVTcidnFZc/ns6wBCHuLZ0E

SEh0tAAzJQbLbQHAJ3FiBSNjYlLEDmRXhGYGlh1m9NfXaqv3EnGFhA3JMiOwZjF/xxslVohqb4XH/VMc

KUTp8cnsRpubnrfdqK7w4ZBvFtNreFsizqnZh8Gftt
zgeyUO1Z6KmORr2YIKRgA/vdsJOu5gdC5frLx8BTgpnZv9OBwRn3F55gi8VlQS+9bNy7qEmFUSk4vIi5

0qdvodiTicADZWXh82yF4BgiPrRNuUWAkI8izjq79HWawbghazp6/mzzC8i1r3hVl54KgxJI0uLgOmzo

+oQECN/PTMWXj9xeIUnSeIHf26YqIlix2NZF6sBvwq
dY1DXz+MrDvp1V/lKU4i7FKhL/thRSRtG0/QmFRoCrnChCv8FDa9IhjiTO1lJKrFf19mz46Qa7L+p83y

a5IWI6j+EOjPVf1s+OMob19AhxsobkIjVZDPlrKUYl3E1mARgSjiC5DG37ip4j34O5+/FIMY7TD1ctQA

0nHOuDCSo2JKxkq87l7DbD5AtAtL1ahrQpFm/
L4LS4zdx6nbOu/wwK+3k7/EhXg+V8SOfGWvMOtHcoHdT42LSO2Bo/EWZHaRaqf+uGJELZFhNUpRjkKbY

0tBIblgSp7ihAaVPH6AW+WDkxfCwEStx8nXol/JZt+jTbMascl7ynlBTV9DS6xbpCtMVXRn0UDwuKJre

vB9fbK35xEK7gayqlh34M4eIkNOsjse+sg7bJsfDcj
IFNrkMX2c2e9KGSG8uTR8vvXVhvEjzxs+a+9hHfflbtm9ci1Ka2bwkE3UwbrUWwnstmtsbULxO/XJNKb

0rP1Doq0Swu2uVodbmu/eHCCIPnTc/s9oXWYyD1v4a1hykvNnGqvNw71mSO3MdDSAQGdK8USSI+eSd0Z

/GS0IPlkzGYxOQt+h8oJDd5EuW2Je79V71tnzwYZfd
dSthXy5HFZT+OSrz9b8PHpqM/YBcAupN0L7ksp6s9+z8mvqpsMdA4ENVVSB3brhklHQSh+pHLLYQryJg

s7y+1ftzegAlDRI390PmZqLVCuB/WWVFmOSDMwXOeTJLA046Y/P9Dlt4ga043GIHvUEwSVjkaLfLEFBV

76EiIGK3Gez3VkkWJHVwewuHh7udYWMyMshxNE8nS9
UumDwsFu1GcLCqnLezBqNkYwOVZf21i5JaOAaxsm/HlgezDXkwqT72QSIqoCRyywiUwxNiZ9syGIit5m

AooSPs+U7f0p+Rw6UKLtkY6jIHt1nuaNxXciL0dY1+b1tMhoTzRmC4+w3d00R+5SK7dEIW0kAchIOwKT

1syibN63fM6XyCbQMlVbKiazQoyYc47GxwSiib8CCu
fGemGWs4CUcLo2c2xyTIe3ZDudwIB0rl8zCZ+/95/3bNNtiDIKLHdX7/TTTt9mMBuelxQXWbdwflLQD9

PrZavSF9zJj39e6QQ+61j8Z+M7cXvP5/im7eThZoxs8nxWuBfnlfvhJM4CtAGgI/oVKpdRl2wTKnYmuj

5bSIRoD+JbGaKp7kZzcZDSc29OdWJ4Ff0bl+sTk1di
yUkxgsrSwfgZ5rksuWVVNwUP1h3PtqQBaxm+XVXN/CTKmP+AP+ootwzo/aCuroChFiIt1/th+Ix5bwIs

wzCx6QORE1j3w9Fu9nL6u/T2A5xHX+CFX77TwR8qiQPU0Od4NyNXzbxUkEOT36VwTj6DRwBJ9eW/Bf0h

3HndGvH9LQW/MszqKnVkINE0Pu4WYPjy+N80BqBALQ
OMico0wUnEU2DoTqYCrw0PbJZkiI9b54cX6KUS6zZylFDMM0uXogA2FcI2UGx6pnG2TR2l3N+Z+TYA0/

204IgZ/2JU4dsJKydfHnnDKTjgVw75KBJLPMthURUDOxhFD5tg5vz7oQiiDmQ3s2fvexGFxQVK6ktYwJ

oUamaOsiE1KJ5eD/T0fK7ShW3oB68KxphP3m+r19p/
+c46Ydgmf25MetSyeJ5T7ilfX0ehtXWWi9V4Ia6LSVVK80nzcFhfNXKFBY71nlXaxv9IoWpheFgLyest

CkOpAZDdhqZN3TYGjUHEk3Aw+TNtbYeqtnpnzKqMD/VownNGw5y6B2v5AfYs0FL9VjjkoJMrag9bVBl/

Po3yT/9oglZzO4dkm4L1KnTwr0S/SLWbp71e0EaQaN
Rgo6jVkksmgTrLXk5GTKXUEtTnmxoSzuWRNXupc/VzFfUobaj1d9ej7UMpf6kw5Ew8YFhaGjpF7AoCqH

WoeYGzvsUmKciDveTigDcn5q+r0dwxyqQ0UO33K55sRZ7BmAAJuMe4TZNDtSDcafcPgY0sQSwaO1k88Y

PnZeA4CbItZxtnQ15DE7rBBy1rS3xJV9tye/97oopY
WoZkRvpfkaOctzXTWv88TKyebJl+Sp7VkPfMPhXOpLs3ycqbaUVb/bn4yb7aJQLIxR28VSYaetdOsMd9

Ccvb5jxtRy53MfEdbt1WJFiUFrPl0Ovkx8BA9DVO+dSwhYeSCz0k/QZ/+IaZL7aMkj18myp1vz1V3wyX

I25IBs1jnVIG77jI6mB7Vak0HMjJv51aDpi0eIZqT/
svQEQjSQmQMQQou+coTgouU/D7FIaVMunp7w0qktl6ujEf0593P2BGKzBBjmuodXhvSn5LhnZPv3gfzb

s1weuTTVV2F88db/dmD6Pg+JYtiR3nBAX6rcgPz8TV0xdn5ItamB/fakq3nE1Ay/0iI9dw3VtFjbd+0+

qcxGw/v07hkDAr7tqjVutVMSoaQBRYYS0A4qkL0GJ4
J65xFoE3cixwtxQX8vPOSnlgH9to8oi0JWYa772NJp0fJRcdrByJAXAjvqyL5DgxaA0xWKls8D94o8Pl

a13tBZ3GY6nhbuy5dmDcCQ0mvthi9VrbOL8js5DdWTzz8PWeiVPaLrEFvzfRCH5B+iA/vcDVTBuMUy6N

ou3kUPrjbeBV6sYplWlloD9T7wod2v3hSi+/wDnRGi
Thk8HFeHVq5ix1+zPn3mWfayZUmAqyvURuKxHQ98KqK/fdnnnLOd/AA1yFvnMy3l0Vc+td1J7QiD48ny

4eAoJLnroe4/Zvhr5/I97+OHpFk4IBlagooxNAhvah8POATbVYEc2nK2uJ6l73+KtifwysCdeKolNrkW

UIJbo/zK24cnBiLfsh2j/Io6HFBEVPwXlLkeuATDgX
BRhvG/fUH2/6ESMI3jXA4XAXKubET5PR37Cc5+yGC8644orsqmFrgKJS/S/ywhcvvWPKfNKSaT5v+yqS

K44V1WTAhoQOzf3RqMFDLiYGu4/VY3f8Hw7we2Q4Hgt2gR4QmuSeCRW9OTE7pPw2wrOSMM4sxbuYEoi2

FD8TECX8F9VeZr8reW6FyAi4A16uu7PkW9kEuljtYg
9HLkKnFPO3NIL/37JETZvbAzAuESgbcryLGPguN0aburuLTl9SWevDy1YsFkL38BpHi8urZWcMQrehQ5

03rrf3iNKwezXJ2UfBYc9pkpXPk5PktgFL41TXJzmiKbHFv3QqGb4EqYdXTzIWOil3Hqt+AdvzR+zmRG

McI1gkRUA0SQPvX7x+jV6LYbaUNp3dyXrJqHXrNM/8
i/F3nu5s/0kzTUeI//8DqzWZG57L2KW73UMzihtYiqqQs5xaEvE6tpZxZjSW2p4AOH3ebbSJ/jEUDgSJ

tXYpDZzvvlElmzUBzk+5J/vzO4+CPxpCtlihcISaCxWbwpPfnnScU5qiCTQjPRghhG4s3+Z4hJAdx9RW

TZfhGvPBB8v6ZW+t4EC5VDTX1ZESTBxfXiDvUGo2Ns
lEWa4lWhOxoO1mDlWBBxlgzWHbqSjR6cn6V1l6Atx3YJtsuJxiUTnZCJGPp9Zj3w4P3mslkpRuFwNs9F

NHdNjPNv0aZ2qqD5+V7TpFh53cEAUEjBZtJkqBOUUI5lWVHmr2x27USAGoBViQs+4EhGjVYqjk7sn7KR

T/6268mcnRHjyCdXdtA6Psrx40WE5X/5wfLN9/gHyr
I3CuEWfZljpY6xbbcL3/Y9xWZxYzY2KmnfOx3AqWiM0iTs7dFtMFJ4HY/poHXiZQe7lk2d/+E6w7onpB

YKozmbENLqdHIuFOZzcCqZMqFZngPp2IUjt0A619Z+JJUboKgqe8QM+NHyA2tDIs8KegLakrV1sesboE

817f3lEXr7qUnFAjUUU7yQfR/h1irTy5+tadjy3x+Y
295jLVi2OSv0RwI3rtauX4nAgnXqjpyJFvOCIeJ7lI+Nm27oaso5pHKr1yOd3RnK5bezy3X3+G2ZLfnZ

eIuO0TnmWhhFks6HsQ0Y7TagXhUE74sGpr/GbtFlF6tVmbYO9pe6dIRc1mGvmMzangpEQJBkRJ8Pj91i

9mmZWkoE7BDex2iTKh9q111QeDMUb5lEf2pcvhhN4f
fJ5+cGWDqGG2rPMw7aP6YSdIG2GjdV66ARvDLlKqr+BAvMJM4h4Oblrbp0NB3+e4fEHRjVBwgLcKW0Hr

k9Bc/tFjG61L4IGiInbuAXV+eM6/Hrl+YYsnsn30saDyubDDHF/BzHUd178JIzty7zO9G0tKg0JRyJ3B

/54L32jYLZbrJF72chg760FnQAh9Aazrgm9jIkYXYP
tBGXV77TsbWEZD5ceE9KOnYinP/hgoeh5ebfRPAJqxhIqDsF/E8Y50+6Y+bYnKvvHneCPWnQ2rgwb4Gi

wc2K0gAg5H3C633Dcvvsda38iOf6c69RwuXF40gW4iwG3xcWLWYGNcIp3/v/D5pnXHHwlHoWpkrvHCRd

I9x1cxkA8wNIomTm7EdSApGCGzZLfgCegOaWUnjMcv
9X348heYoueGz5BUF2plafFoMjQkxh6ob8SCnLyJcXioltB7qS/9rnKhTU0l1J64viTgKEctGMwfBU+v

J4MEUuu7vBt2GLEuy2FutY3l+uuE6NgwRb2ohiZzuuzCwwCRcRK2B/tTCHU3KMRb4pcFage7+SMX93Uo

zprlNW4fxhKwh9isC6CZ0eJmjxBXS1mtL7yaZ8P1v5
quvRAksJq1HGGk8ys335Xgv5Ufi4m/bgJq8H8VKaZJmR3qKH+catbGjqlbCQtl82E4ZWtoAjJFwacnWt

cEDOTKnganWStfAAx5CwEoQ0i+Q5N+qGavep97jkdUz+ELAg8XwWxypW11LEzA2TmAiLfAHSXqnbjj+6

vErErPp36p4xR/7v11UAH6LEyLsn9E/IVgibaS8s+6
Y0Y9/hBv9mmqKe1g0tueOgWsyTGgUuez0IlAepL/WpQulpl/hJHFSD2+i6u6NH7RXTg9Q3mLr2mcg7It

mVH/DbPSkYRshnCEu4CWJRCEfPlT604RmXLOBBAq24qW261xpOQ6ZhZxopxPotdf6Q5qYHvRw+Pz2fb8

ivN7X04HauYXKiEis9qS2mOQs9GwqAiRTn/zs8vhJD
Wvu5IdlYwSuWstxP6Tce1CwsWQfSDRVnxm+CGD5EDskdBMIp5JzXJTOv5X7wpw4w6qFAtXLRonnBScgz

E408sZwds6jO1BlU9zhmfNPn2MS3TuzARg/MD8Xr+fCT86C65evIYKJWzK88x2eXO/co8pZBkY9Aq8UO

L88yIbI6pL74aXgvOXdY8pxGWInq2lppnncaxSATOO
anKp1FhSWmD700BXfJD+nNkgsXPJsAS6FlxDMaQJu3eW10pKqtBcQMPAPs4NROKsQukcZRxVTOGppBkH

xeS7ubkIObG0V+9DnPJYDqtf1xDDXEQpV9ITYS8s3HkAnODY+OeVnoDfr8OOiBvjLdWUyjszScT43hdc

NTVahOKfp0ZP2yOF/HauJ05fz0ZqPZTnv/DDOAZt9m
B4RrN0SzAveOiNgTLSRiK1L8JyXVfsNxenjC6GcRdRrdXJUeOjpNt/jpXrwYpFaaiCToEWAJ+2lqdDbO

oP349pwD9d/IJirnoPIusOnJ1o0+HNEr7aijdBSR8F+IIg0djv8En98KS5dRUanSiZ2++whYrAJBg/kL

041W75sLwVfXV2Xe6ZhJ8e5OHgxsD35TbdapGwIAdC
9YR11T1M1642PTutC/5ZqUUEtDO4HyW6yhufhAzRt/nX7HFKZ5VEc4ScYEMgA6CSFU3ANYbBjehDyMBj

D8/gu9BXqmxuJ9MscBEklLfp4URC1t+bF4CjNF38S3EO9ZSALrz0AzfuSfPCm3raZMUj8izIsVrBNuaD

0l7GW5LYDLHrcrM1kt1//zsX1wt8pFtu4P4w9wxZOQ
Hlt/g3Eavxe4aemGbnIoq9yRpSQVdwlFQo/dg7bf/qvAhlqdt/NSBS7q7fndSC9IMK10hXH1WXsa+7pp

Ogsmq2R3CpPUDEuV4Lgx3XP2YhKRwcbzG5aDqbMwNndwGl73oWq8umY4mD4AThR3B1rKTkCLOek0R0Qq

xwbrrhvK5gzyw0DLbtYP3M9bCfA6zcTTzxkZPte8Lu
RMBya4rlPX0l/sWSPQKWZ/iefhEoLToflp6M5mfUeGIIkOzdSXuXJomS7/79vcVEGu07a9NLYMpp4dzs

PqtP/1NjsOO7Np67LCSenQ7htVH10ng/sE1bA599H3EL+IxeqBkQPdZmt+7GXkJbqI5X6IgarT7zg7EH

GZbqV3SmeZ3ley0XBzeZ3DaVllbXEEQdSoTJf23Gps
0elcdPrOIvrMK18MgXFwHxApRoHVDrMph2N4OrCis8974XSYbbV+cqJMQAoa0a6epUs2ShiAGMWLNGnO

jQrC42p/s/1U+45g20aDwGlRSrGG80QEK0y9+jj6ncQ4r6UoEMVgXpHZ/hhjViTwoUDNne/NJrwrJtig

eXVELgdn10FP1wBMC0a0SILQ9XrjVnyQtRy2R6iiHN
CkDW7JdCdU/tuexN0JLgTNZaEz1y4g81TETPOuiGZ6l73HH10tMMW8axsyKGCDcpIf7Z0pk1/JDal26l

q2HoEptcHSJ0p5ON/j8N9+YZo27dEx2R9GdAF7+SbSkI+302y/BX5zoC080CiEjO1jf7W6oqdW9o8BF9

2sVM4XqvY34QuZOlEv09wNTXtLCCbOxoRqO45mAsPX
V8rUD4X4hy0atpwdPiBLaQOhT3XI6RslpGC4TGh/RJEsDR8Bf2KnYT2Ic5X4pZlpPyldB4Nft3Mljp6a

vGtrCLzySPmYxA+N3xH6HtXTC9CKsvvHo58LvcYl7Zmd/tFmA7df6EyxjApzXkVWRW6VMHSA4YP7L3tc

VyywdW6g7draM4vTo5hbOLWg5KQ1l32XPweXeX+pow
axnWe1zJaANiuG3fm/Ji8F8F1ShCpN4dBwZYGT8cojSFT87DrmKeadZTPIvklpCKrgwO8VTdprSpBy9B

EkhQjvC1nbRjg3VVRGhtu+d20b+mfP/wsHXeDydMpj3JDXGfZGwbeU4eF8+Cozc5T/eVQxRI0kTGYc7I

Vf5oHDWxMnrZvD/tBGcXGywZayoT5JyvbNDhbNMAeT
c0fsYPSMpbFWhQf2yhyHdwi//YlvrCGIez0Kjlm0BKicUHDqbafNbk9GOEkZOQxvCymiA9lQmUW7ow2b

gSpN7Wlx2+lwiE8IkOqy0QBxqB2AODyFUkSwe352gJTaHFQkOjDmFC9E3cH6gH0ZSImetXTK9NcvoOTR

7E/b3IVbZtUDDSPoT35xLk/+s/Sq0Ni990rVsBagQb
lJKmJ2ISukgkm9yPMw2/bO8JiXy4UKPmbGVTO06RCD3xjvj6fvz8zaCG2Kwl/nA3MOEeE4iT6n3YVPDN

Z29S9HWOY3o5A7//qTlK+ceFudOQ89UqX3jrjd04P0uN2vILecvytS++tELRcMHNIaE72BTFQAajD75O

f4mH95B518CEFHOKgq3O7K/ZOdb/1ZZACJ24FgDbbU
OrIwVTDPepZsNPdH+BoJi13QE6O0PbGoeLmvrNIre7/BwUn5lj1ffKjhUVg5Rud0N2zR8oD3g3bzImHK

fVu1MwMnpvSCth2+Pigjg/yYVbyqGODvEjjfQuS+Pogr43ipAm9l1C5HTMywiwryAG3YKvhvOsWIZS0V

Z2P77Viu9zmVVz2pb/x3jVYKVX/YcuTrfzxGtocBa2
O4UDeqknjxnS+0xgzV/mhm1VkoosPEHPlUlF7xzeut6NuwMKuQoFHqAdXQvxxCcR/ZAsB6HdRs3UglJ3

ItqPG8I303iuczzny2x4f6n10rc+wDD/a1u9qjZVSkMbl8wAv72KvbDo7UmYtF+VSL4l8+KCRDGIOkRe

Wn+YSTMO/YFkq1LAzUL3IBYooUH7+0/GmTssXavqrV
Q2ZU+dcANTTm+DXXES9v31I2ohpZScLvz5O7nrYmA7p/5sXH4SsfCb7Ap9/urhhSdUwf0zEzlxbx0y1B

gJk19ybU5mXfz89UQDovfDBjJLDdq9xkpxzp6q1OkzQwprlkg1nv6bk17OC0V7g/p42O8VaHSZUe23gX

jIqzOeI+RVL90noBP2BIfwbQSY4gzDcf3pRO+XNhyU
qTx8fZ9cHglEBDMA1bWriF5c4BPoVopoVfhGyf+KLfWKfMmje8dadDYZBQSZEuodulE7//AokRG2K2Yh

lrsTZZHG06r8FKrUkiGCbq0MDCWlORNMHCiK0u0/XP12MEGN34wLWs1Tk7d6VdXcYavLszf3HtvqraM9

5QUoKLOvQt0vdYTL1HaERCRVHJ9ERJ1607+s7uwahS
OstfE/P6rbq3NXy/eObplIMbsG7NtDGrdLQUBPsQ6rhjWRjH2q8UYb+VJTN/YSbJ1t8nHMrPqZZ1y0Hu

CfQiUGwZk7iXibgKSw8uFmvtOefu43viHC8oTsdgk2fy8alfT2psAj7QDnc16MkiYen28Mn6MYgw3Bw9

n7nCf5mJDkjzWbpAmPbo1oZWmhmYXBb8QlW7cI4/bx
x8P3ix825TJ5R1K0u/3DTCkShwAZNJsSVjFg2bbIXUcIoK7+uzd1m+A5SzHYaZ62Ndy0t4K65PV1rJop

MMtO0otRkV3WAKI/U/0RBB9n2Gyfco8XMqwkhW4sAwkMj/bjpYzvNDPrXotv0vZBCBjxRh7CXcWjYrhM

18WFdXPEt3QtlY783yuhWka5iHethuEwKoB0AVSHLX
Tox0DC1fErPyjnVac6BdJD4jsgGgDNd49rFtSjsgkeSp5LBG/PlVAPLngSyyMppax8NTOIu0JIev6+fS

kQ9XHEFQ02xWr7e6U6RDx1MH/SFrca5TzR+BvIBZf0UckzRsRo5+nD0rA9nxj4+kmCSxTaM2EfvqnDhX

bryan+RhxbO//d+1eNa3y/fG6ZR9daYsS24P83WXGkf7
huzrSYdyqoprydo2D5lYjul7azLHBoY0ERbqoIcfK3JSdJrVUF0ZdablzvWfzB3fpQxk8oAETdySRa+I

8x3oP5V7yz4FgXFliz+PMvk/U/kf5EeEkhDs6em54bjphhFfztjeDxzZ7CKQ6O4shlW72s2WcsWY5q16

Xbsx2EgWatsNyKl5KrER9Ls3O4A/A+Ym7Am1enmL7U
PwJilixiyrVBIilwyYRPAeWKAVIzPKVmrwErhU+0Nc3U6n5szEPmUNxXxrwAHOHaMku2dWMRYflJ7EBe

dr+73c6DmJr18o9UY2fP3xwth8cQ9ksSHioCehbi+XRWwb6zPb6bYE96d+ADCLqOG5kvp0FVy5ubDH+0

o52vLcQgkpVDjiiYx0X5h2rMwaht+6nKoy+rip/QGN
tNJxVT2EBdXbW1VZuil/s7ZhmavwUNEs6QXBV1LpiUCqskvb4q9eBoWp60haU2c/VZXe1DOdBxliM12B

LZMCpTGKW/mDLJYuj+/GT2mvjItvsLoT2DdyDOERs4o+Yv/ZX8tvyWdO76PRs7dZBcB8ci0715sUK+9X

pQ9m+7VYxKdlBsYWOdQUjEFfmFOVSldIti82Tv2zaG
2QP1Tj47pvJYcgsQlk5mSal614kHRFSrxMP9qyng7E48P9gWXFsf4/st7oT3QMgp/of3jgQDMoTGAZvY

HJlY7QLwfvJQA+oTe8iTZywkyOCddYiu+4TNtYcsJDqglGunRmG/jzwop+S+nfsTMFK9Pf2M3qDqWtjb

/UyazpP3qrOtnJQ6rQ0jFndybGl38d4uXpNVUS3gyh
q/yv6SweZrnrTNlJiUVmWsFciCt/r3Ghtz2dydvS3Xt5uyDKTdejzli2NygFS0TfYtl4CFoWi2E8ZHD3

BKbQtHKdU5OrXhfnURQDTtH3O1N//X3ioxWV6uMLkYZa2xdKwkRpCvKkR3eeX0dGkT1klS0BXWxNMyVN

n0b/0C6TqVQ4kJqkj2fvWsyH7IUQ8ZndGAWi8Nyfql
sMQehang1aCmPZUHtPQzA3nRXURW7hsj3pXXaWGsU+lRdT+bv/L1z4/ALbAwUwUIfH6PngV9mOWdXesZ

5y8MTrfxii2icVQboqzM8Z4LpYdPxDDDzIKIuL3hLOn3pVon93Yf/fkWIuJEv6k9yyrvoADHxhN/Nmhk

1YJa3+sGdbCIau9rhsBXzz+inGj5gqSIpTHCefDfEX
yIlrURozqGlc54IeKwUohgfHeSc+2JEj8/0RlnIEDnUcjxsBfyGOJaEVq21kuzzpt288UOTNNTgGfcyX

4oQvOD7vuyid5k9p1ljQj6/llG0GhRDLJDlu843iWZN1+Xu1l1VFSRmHNKOdeU2b2B3arZIdNXa5tCNV

7OHWrZocLRa6TcblqC/WRFnoWQ8c8z2UItMMfKn1hU
7mKZC5f9HZimEhV26XEmnX4+v7K4UeK1lbw45F1Ffz4bFn16yhG/EDU+5+Mohtt3xmds9hNu4NON73XT

tTUQnmW8fvTbhycFof53XBpMblohC5Co0o1YK1SH7O8na+2+D1G/S9ehRxCRERAxmezuAdBdVK4tmesP

syzuhqx0I6tiL9WbuuOoVO+wEAlg/mqw9QQ9cdv2Qp
WypsyYBEOBBTmnPesrgAIwS8dQR5e8nZmIjWvcLxiU8/oCQBgBPFOed8mdoHwp0Ouwy31+GYapcsEDE7

iO8TLv4goKENvcbxV7fQLSCBwcw/8Uu9v2UHXK8oTr/1g4ylyNeWN7PS6CZA38Bp+LA2dzK7MjigXgST

xdb4ZIkgXXphWcmXMvmVirtFavh8x5ZMu+ImAEPMRS
idJK9NbsLtoqQwvQ5uMwUQ3/jLDc7oSiKAJNv5X8WoIf6SwdZ/hIl2LyzuD5TcwvgJk6Db86se6s335X

YTcO62kS0qe+rfVzJ7JDvLEKUE06DLVToQwjtgUdRap0+2Q2ebcYDOBRmoeZdWrZCZ0fERnzqhZ7sx89

ErBJ+IGCLZVw0tR/uW+qZ7rM/0uwA7QfH/3c3F8ju4
Alonso+pmakqBIL7ghdm4vsQcCoqonU8Y42tFNv0glmWJiYs//34c9gO1wFv167u5bLbSo+ypEJPab5dQz9

AJ4cWd51o15tjoHKRuGf9JWfoOoMoVFih+1VEmU+dLJBwPfvZxT90LGvMuGvKMcted/q96EpoQc87ETu

gOpktLQwbRD27b4WViaeQCUrCjZi6ItTP6sqlLO9lw
M/A116pm3U0I0b6yVOa//KTSk2j4Xm9e0QV7NQNA1O2liaYCUEpUn45EGkXSPff+UTKcSCtjH49PlR1b

nP5IuitMIkqp4t4ZHa0Gbi68tNcyw1g151jEiExXr5ic5qlRAWNeqFTai8ydQ6YJJRB4dRh15qm/9OKz

G/U4Rusefg6eXPa6YY23VG7L8E+NiHR+4vyvbbvpBR
INXtPe7LsualyI/EE3RQXKISUTXCCLumJ9z6Dfb2idN1iGQhM7yA37ym/+U/urQFxvEXU8l0JW+u9rMT

Lko9DT2aQ4x8f9P5fX/p1cm7dnGAITvqIIPgZQ5OgiKUwiEx5gYrKfIT+RfF8bk/Vl6JLvWTXsu0DZZa

mGCylTbA8m7du/059nTbPS7WFOY9cXZaSZgAh+W/UV
Rob0aBHiPkRaz9XW4wBXtE6gBmVOAkdzP0EMFIUiHfxXvBFipLXBnh84gLvUNRsSoesGooFVX26Zr4au

xxbe7kUNV73vgwBYnaJ/Hsxw3AYyceKrPck6/oCv2kWhnmN7OaX3+XU5cjfQXRUbKBPgwZup9gVIODy1

EOz5RUMA2mTDoKENgSgkX5D8+z8dttaXo49628wIGX
/o8nkHBPms8nP+JKU2icm6iOse3yCZGv1PG9xe+EBl9i8Q4Yr/US2kOos+pj+MIrbkYPXYracBs0fkVv

pBKwhCv2UK4XoH+N539PNTYK2ZUE/lG6RXl6524eSdz+nlRTH+fK01p9Zl/m+eiZE2q2FEVCu76HPa6H

obK93Y1Q9Q22lU1wjXtJ9+1BDMbLqgHPh9zAFuRloK
Tz/blEawAkSK3kA5EAiwQzf5oab+GEbo+AzqrYNQMkveQ4cfKgkf9oFnQM9Nh/IeiwviVqLnly8xYGUm

w12swlGk0Zgdp5IljrNjArT5G8Ag79ONdJj5kg/TpeYrLuWJUbU5rP62NXsEXJSXJhswKZflT+1Ni/mi

cIRJ48B7CBLcF0SEdBRkgPak9Mj0jFz6LfeiIEpiww
oedtnVK01Lb66J2bKl4N+LWTG7eeb0yJvE0AGGwsFjfEpPRYV5P1+xNUHBdDQKD91L9+UTlM7D07ATzx

ttEl4UHgjkjnE8fCFhCGVcS8KELAXC0x5LNT5pwt1YlMBQarjX59zr8XYq94s0oLAK8glLUuHqCiHKZg

e2CWOYQJvf26KGlD28VqIFfQDfUj+5k5f1YB19N9/C
dHZB379aFPTVNj+j33FzJMVUCz+xLNNzFp8Fv9o1NZAkIO2VOHQTEa2v7A1SFAy//5je2HY+ftplEvB8

AC8sqRH4cElSzw504BdUWnFJEP+WwmZkDDrsXISC0Nd+oPT8mJ1kJdBMikVTJx+k5SRnHnElJTFSRfs5

fdBDUDhlRfc6+of1+NK9FRuSRRtqeJpzLsfoSmTRFA
dvKV5b/EwEy/Ygrtz5TXG6tI1G6vODkAIxW63PqlASepsjq0Mn9GF1BQoh+8ZK2LIdtg7CKHctZvGuOD

qTyJpETKrts55n7coeoyYcuYSHnGdGcb3pS9bFfCREepdAinJjrMB9TzTVFICtSJxiO0Tc2pLw0UMcsQ

6DHPVrfPJqoLTyFH/vWHmbhnEmy7vcp1niPlqGd/u4
Az+DqV5xOZB14HhhNYTdgWnkkKfJ1nSbJENPWF9q7Pt/gy/kRtzH4rzr7XieAjoCU4kxIGBGBCxwk2qL

Ae5HnrBRpldyee+K5x3rTuhuGy3GxLLhS1KPj4hIWDdimyfV1NlmYW+iQdjUqBTWDztgn+PpG7+0kZHz

5nKhI5UzD6KHStWCEY2fhLwYpC/z2TN6CQtsrIGoUN
kNa+zkMVrbB6NKbENZtKH0foyhcUXov8j91csUxYZwqBOAzC3Ur1RN+viiB7rZi0hI164gEzTr8o+cXq

iF99A30nucjOxzkM4n4QlfVy2mQuaz9uNZVpg58j4qfBAmWtwnZ9SLp1iR+ip1FDY//lfnw/9kTtPNz6

WtjoPypfu6BewHTfKlDlX/WpgvesmioXqtSpOVl/WK
/Eh+2R8kARVZPBuJ1Vf97ZEb4XGqr1PGxkduRwtq5CazhXTbw9Z8H1uT4t+XaJ56CUCU0WJujMF9LMS1

ZxTfPBHGfo2oZBifQ5gO7N+u3wJSmcqw4w5+O+8vpd+Yp11ef/yN9+ixPu1oMhGzjRdqif2pdSC2ZNAH

ct/fpHcBL7tj7KLPS2hpVaStZCPV0wqifnVy7RDoh9
jeuc786fzrkW4feDYGedKRx9BQlFhdcL27FkU33xBrKtxPUbbWuougMoOCviHVEXAiW5P2xtSIcswekM

KndDPm3TwGqQZ/BRO2LGv+NwGRBEeLjPq0qfEL0fiHGs726gspBRUvYGX7hqI1W6XPx0vriWno//pKlr

VRqb2k+tj4pAMvMv60Z0iZYCaHTbh4bFVnUJWN4yOb
FAKpOZzFlPijwt91l9Q+ozLt/fyveFQfTnTtAxiJraLMpFGN3ugm/Yuuy98e04Px/7858P+r8YT7B/ZE

jI3hBiuSWOAllCjmcPQafzuVUBAZ4nmE9t4oHnS5IYsBZrjcAnh/R9srErvJuTpuRR8zitoqzGLR3X4q

3JgcfoKFLoNFhUiDs2z+jzudcsXypui75HKSmgg6wd
0NPf2ONR5JYmsE6LlampGD4l27GERTU9Q98WpP3KTtylICW3KY26LbHYgyGQfqLsGoarHbHwbW2buO8x

86SlUZt1EwUZoUza0Q+/pH8eoEEq0UyLb/Eg7emqiAHqLYvYbm+tKvd87GnBJaA7rtfjc9GglOMVYpmF

Cc5lOfq8ebqW2D4yUuiccQ62YD/v+Uyma72wIr7Ni1
1nDgq8kwTO1+n47B38ZXmGym2EQRHwvtIaSpf+7+P0LJPIUuboOPcbKk9yEGzujzbx8UWRTQh9fJ85eo

V3YuvbaBjq/hF2UavgiiH/lrlx6xPN7bA6bCzibqg19LvBOIMYJZX7yjkiX/+kCvCf2NM8Iip+IyZaAz

oc94akzwki5W0ozlsW5TaYN1YaVZlaTE00vpSiNc+r
2dRiF3UBhlCjjC+baEqFSJqAe3pU09fBS4QBiQo8FghSovbdW6DX0rmgAL5YJmOmAEMxnfA1NSlZ+sr7

EqZDXwmOVyypv0TeyL/Gby+uDDFvee37VyMiB20epMWK+FY1BcqyQYl0/dh02cPlSpSmzZQzLc9hv0dl

BOyzsDkwmHAcCaQtUFw2tw7O7/176C/1M3gOafOWkR
seble/4lLRLGjavD6a9LFpRdlRteN5XGcrdKiaVnGTywrRF89S4nUtrgUP4qc0jcTt9as3kj5ONR4R8IW

NbfIlGu2333f3DSBLC/RnYcqyLkMkXIpFRNd9e0Pk+xNGEqNQI9EnhufF3HrwaJ4tokyCB5cM1UtgF1a

YDGcdwpabDSy2D0e076KBgNK80MC6z+eLWxmpXc8eQ
CHg+0f0+TMzRUjS4XAK6fb+bM5F6RqcS1446Xr9u5lgoBeycvvYbN0/ElObHO/jr4q2l595t/8+81RDq

rhJx4jIAdRRA94flGRGAADEOpf2+l6HBVyExVVzed22GSMHXkpLCYFduu0LrR/avS0TutrOc26ie6aJ4

huj40Kc0sZRwR7296xV2f+4/I0ngP8y9ojV/yS/Zal
+FZ8F/tuvMRLrAn23VyRkP08rGZETnQRX+Mckenzie/Wt7xX3z5tk1J0elggrEp0Ffbc8+M7hrXXMoWveFKmLh

8sHLXULYM12onZRhkKO3zuuJUnWcR96yWx5wr5CG7ReZHvGzdSs8s3EINNbiOaQsDXK8g6v+k+iUDVls

dib1yvSVVg7clJo3xRnHM2EvWsul60CYYfpGmZ6Fgg
5XRQgLvhyztCPNMF+3bTC5/XyZizIcvNhh8rwsa7L9QlEjRnE6G/ZKlgzjqW2q5fCsPkOCShE0UXjF24

EeZn2X4YJDrS5yIkRjV5+ljjqocfeh5U04lxAeLDAAnfRYY7aZxctEVwLEsovdJdpsgSBwYTeBvlNSK2

9VuSGabV+/80lOWhju2097sKt1snTq1798VWsWQpfD
W7ypDs9X07ufemKcXxgm90g7GVpfVl59HlENsYavs8vKxeQGTkeH/54nZZp0TWMtDQTZhCfyA10SHVGV

jrPdegcxINiAPkAnS5xp8bKHJ+9yN1sXvQIS9+37XzZKP8muFJH/bIS5QYzFOyVQdfu0tWz0VihoVUoX

KLcw2edEmnaFImHZZSsfP23xdbEXzm+T1NgOQZqtlJ
iTV7o2a/kkawLJnt1sLvuYFlV5r+g9kg95o6AjXuz1BjxPTabC3ylGPLtS2c0NDFRreq5CIhyxtugfeM

f/pZcUR9geFlWHNWrYuuhzlOQzk3fyBJ6Ui8SOhK5n3XAKBTD15MR/VRZyhLxLSqAB687o2CYJJoL2W+

qxj/C1lvueJvZJW/M/Lf0R/m++Fm3rWPWmaDTB6UMp
P2sLMZ+0Ir7kFCxeGUkAVsvsgXHXJRZ4SC1QCNMESDOyVoD4fnxB1RYiP+5moyQ83jKe0zFKhuEXskYe

cvC2sOROFgP24rZBVIKZu2nhVcGDiMvKG8NTjnacBCDcJyiHqTFhDDZSBHYIz6pA1cQDIcvwe/X6noTx

YxTq/zStcBJK3SOUgHab3shlEsCYsDdwUquGn6dTGa
GoHd45L8w56HboU6kTemtHHprozq0oKPpt4xFJdXqxveA91rCq+blmVf7OeQDoO1ne5GAf2HwFBAtOTs

jvcgLhUJj/W3HnRA5Fy6lDTK+44uSQPS8huD5ulvvbYFCFLC/9GgIHdsTH/DvvpSX/2xiIXpoqo6F4wi

YJh3h75Q9oXdjIY6AAYBx8jqLztifBmW8M4BpAP3c9
luad8fIibksKKERdg00uUbv5Ox2uE//EhKOQYXTp5XFVXUGjBU0sZbmwWMyIcL9y8ciIEWbaK4u5ZRiq

oEp5lwMi2AS5f2Eg7VLvqeDzWPGyWEa6iamkaG+QirGg9o/CHxsBqqOX/SSQtcyLq/kb71NzPnuu985X

2ffDyQVBH+JN55UKDc5gsdSlBC4PA07RrXY5HfkOSE
I/c/9OGL2JhQq2Jc4UffDpUhPI6kqoNJ2532NVjt89z87TYlvCgIUaFc3M8fhIjqEYRW/VrjQfJaVCUQ

YqWmTPPRRyERglBWzOi+y1GfiRNUS4iUpGQdP/0uMBIVdOmVph9A8DSZnsg93hPVIZls/PvzdTGXgEPH

e++XsDywENfJzPVurvnTd4++ZwDNLzC5p8EuJhP/DX
8F7UIupQ3eFtnkXxMkllMsCz54Sd9h6K14selDch8B5KyRRzgEEnnuv6kQbDmssgbs3s7B7vLTHzyXxo

Jc2sNn2xOchLt8lUe4kz5SLgysElJq/kONLdU00ConssHXGUyi1J9zNDq5FHgWqNoyJIWWg8UgJS4rV0

RcQIC6+6Ccc+xZerHtXICL3MV6ng/wvgM2fv3qHgZ3
4Tpx+LvaDRdV1e0U8y6cnHj78tsdkCfy8vnbWQyuKfEg1AKytBZGODWK+FvEWFOnxu2Wa+zcPMpCOqhu

OhRON0b5ctYKaEwdcebZ6dohvzhWSBraCfvtYXaYwZPM3AchenUGe1bEAFm/zt5elsentJLQCaf3eIpv

uyTrOng4oU47IL5xSIZIXeBrJbGOfXmH8f01EqEPlt
+Rh0fCetI3no5FGgqYiGLQxsBcTQBSf+PBCuDlVdO8E9nqf+V4p7B40qMHxHc+fF23PEeNID2xbsXe

Up1UCC+/EAC2hMjgDs5HLtuWm/03z6Gz068vPr1+QIH4b9nuh84RLuFK3RHpaezogVK9KVsdo7TgaVzg

lOVeaDhOwt4eC1o97JDs9LV1qqwrzfq2eFTjk1tRyt
LMFq17ewmRbppZE+1qZzuua6+HZ4+NhpDpilvlL8n8ibkAP101/r1rXUJxlUbRjXuw18aO021qYgHwh0

q1K2Uf6eemmKEmUt21j0ZGN7dhHvd0RnFGjDEeBlZhoV2kRrhxc/7fpQ9ze/4DF8LOcyx3RWdGp8ZRF1

6lWRk2PbFfQ0pyKj+4f7x0E+HpJCUwqzgPz1bxmzuV
cADXG2knSYnzSaqXVeOsTNauOpFmWVAyo6/MaT7JEKHp8THiokwHVUAikoYmIy48XXmu0PQKaaydmOkL

7AC44KtDwSC5roqLXiAz96TIFdQ3B8DGXt1SmROJUGJSbpej+R6yMwYlIolwCOiwAahe0ZTk/fAjZfSy

llb6acgjPW8KdQYpBMN70AKaDsuK6XxpZBHVxAhy3p
Zf9kP+RBkx/ZfbcuopiT1hPsm3MOSgH+gpyojcaGkDvJECbDdwh6UJUhxpC6ZFbExHqT3yj0jnlCDxGv

m2AhTn6Or/OD+pX1R6QYDHR4JO+jJkXuS/lFicoat3gJULpR6vgwC+QTOLpEHjz8hf9vce2VVVKaEP1C

zj6SocEvHJKiDEf6hNmw3hUDmKG0PLFX++mYbq7Qt5
vOJbjpbiTtgNw8S6nxFDgPOAh7ZPQn+/gBvPhZcZQCTNlqGnbSpWb9SB7vCgQ88k7ZQrT0KSRVgDOq2d

LE+WADiYpS1nl6BfquL6wdB4UnyfBepa9FBGthHynIkDUqdlASsM1zyANV3sA0/yQ5WJkMVO0PnIxPNW

fYQiETM9Vxl26D5SCeW99PF38rj2yXP1T2qFGsebhX
FK+4yvNJNeCkGfR+EtjaT2xkx2qfhDwYswD8uhePnW7ItgnH95wsYO4udQfLipsOMOXc6IZ9yCA/

okMv+zl1RtqUXhI9MFrz10CmrkygTEba7VHuwx695XIRg0lZl4HdBV0It+iEdyO6I0I++4f69eId3OZh

HO43wgLt4JHWfGS/Nb4z8bl6mSLXR213lMlW9VzGMm
WZTuOuDUU/nOc2ku5yMUxQwUsTirQ2GQqHN5XfmOTUzgGkaSYTdHNs8lKrYEaOBKe26AvVjjI1Eyq7YM

CWHCMJYtKT6ccJ0o6GyRRpVcPRfN4Ro1ZTTslHENYlF+PZKSHd1SVsfgJ+c3d773zgv34TdP/RLFc62G

18hd+y6+ZD5JpxiAAGULSetwnCEqcHG8irT5u8/Vhm
V7xjKI35Em8rr/qqykP4Wnr8Dvzprcvrj9VE5Osiu758IbTGG36Jos1zuHZ04a5IpISwEQCW5tdVAAku

MKf9nb6AOhbUF+PxFKwhzHtMryGqzxY2cuI5GmfKhlqVmUsaw9boXsNcRewFq08Yoi/0BbEpirI/WjLU

YrubmkRgEEsRlVBxgWq3iHIkuozSLKn1MU7T4ArTRq
oe1DiEKB6TiusoGV/Y3cyHuPGiAC8WSAVQk/tWk5ZVSSR2LKPaC7M5OVftykcSJ4pBWdEbVmgiPKMNyv

/m8U0jloAXwsVdqvBdaFTU2ToqoPkjWrr89+uFUUH4mz7MNkYr6cT5OIRcJQ31OgEC9JNjDIajJZq4sQ

aGXpIfkwlEQ/swTswGn33WI35pq/nvmDBP6AAjiRUZ
fQ5a4C9BAKPKzcv41YQYtJPVUYtPHIPYBkVA8Hfo05KyhjcjfcO2I1mHO2pxqR+B+1mcI7dD0oev4KjT

OeNxdPVpu8H3OrQ83Fa6uaFheo1VvoiMXA5NDz7h0QjjtMAIaG+en+Qb7+jf0k1aUqwW6pTukHcOOoxo

3ox6fQ6m/Wwk/CA/En3GauMyOeWE7Mk0uVMNMX0TJz
Tucelbjz4VeZao3zWRsUD+GZgUq7iD+/UFF1DrE799vYYbiPuhXDOMK2TIbP0WSWC6QtDqyvTbBt6+rF

yOAYxD3Ur3SVGNEPrK6FU+8Wg12xqpUueXWQsp4SZmdL1aTaaJq/nTaY0cKapKyh2apbohqexc1wvchf

hDBzZ5h1/VxSaj5Fix7/3mxZe2Z6YkNxBL10DUfqsl
HC0Mi1T3pVmw31c4opCievpYv2N5l+1Nn3Erpv+oJNs4sF8uLJDQIMRF0wQHQrXo2vBuFM/JZD6CmFT3

L/AQYj/Aet/owGSzeBRQehW8246ZeOPWzyUEdMtgt1ljZgxBjn2G1o+wlPlsRtls5cxJoTUitfNde5EU

OKb8pG2653OsIkC+zoNQ4mqJYYwCSIHMMF3NNo5xGG
rxx+Fqdx8lm7PEidj/xVghVy5l/dVRE/AuDv/Pyv/DXCe0IYwnXW6OFbD1h5xydMcJiDLNo/0/oeaNqn

kOEIUUdS6XRCLPnESLP+w2+5EVLSkl/2XSWZ2hDMBrbrBLikwYcwhN7tPwnFW3XkvMullSGPUwuv5MMd

2+uKHb04CPICccpIHwdgpNnw6dhnAV/vosEM7553vN
3VRD1t0ELuttvvka8nNzxv9BEfCXfKF6mVYBMfPOY74NDEPv8m6v/Vvoy4L/APph7gJ3qtZfz6XMkhjH

r296iK0BO2TsjQLfPUlPH0uBg8N7nokFecyxDJVWaGnMVCv1vX1Cugq9LxVDF5Kb9qhuk/uMygtRNE3W

ZVeNq21o/jyFJCIaRl4Zni4QpXnVUdTn5cZdl6Nuac
lNSE7oCbS16VnFkp+smmOSgPK7F5ZiBY+eSIOApnpB96WzfxZjLwKc9qO9MjQtaLaWG7q4qCZkIPPoQ7

71v8MbnFbyy8c8MlUc/IgbXDFhSPgC/9e2B3yec+D0drfISEICV2Pwh8fuXkGj8N0f+Iw1zeYxSc1LH3

HnaU/xJVxA8tVYMzmG6S/r5NrEbGa4SNlDBpCyLQQb
PJ4scjkfoNa6jrplniucdSM/nISLplhhgnH2UJs6sL/9wF3hgB+xddN6l7W+WyKfPhy+rp3mzhzrtvSC

mF32G7KLGYCsQlvWqyqJEqTPuK2Agr/l2JM8n0x2j/jc4sCiYP+xfVbZdmOybtH/F25E9KLiVPsa1ucE

vARSCQ7VQ3BLRYHu2vEvOBXkuhmJCEWALk21QEkhkt
4voa3gt2OtwME4DHPMqTem2G0H/eENadCzOnbg+jOEo7dGycMXirACUWDOUSHfmRGcn7t+daZOI/iV/S

7AxEWkJakQDnwX6MkgPjScjF2WhC3quCmydJBLYIla6SmlVWDhWkhjyAOENRRFBxdyeKH+ZIBmp/r64A

Tw+ZQtgbqvM9MSwZdl+r3e6GknnaVDsos3ZWZxNDLL
i+7qO9SrJ8V9JnJ5TKWHar/+HnNbd8qQa0ykRgSOOkmgx64FbxLslbrqKnsl+YIZO+t6eJ8h+AEIwHFA

K/ici2TstNbSZ8aGtpYS7g/Y1kkWHfMOjsYvjt2vP2iy8PdgLceVyUTbiqnLSlXt5mmkj2/QbI6MIKHd

xkuvuF/K3Zpax2ROYJselow2hfbktKWYsNMjbdtL2o
l/oo5NGK5hr32G1JEhoS6JRvR5GHC7Xd291d07RLxPUhjFlDIICt3XTWusUK6QYlZZRW0CZPZJTCJ8aK

FfDYAmnGB0KDIMWQMvsE6avwM0KrlE/DQsm5QuuDx4F6bKr66lCoA/zmr084cb1CK75IJWmEXP/fBF5V

ztiSjUFihtoCgitUigY+/4FdrRB7MxGMwJCncZsmDN
jBuD0RYCnI1bO+3oewSMvalx5Pydg33DmtzkdgVRMI+wHWP1aVO0HeFEJiY0Hm8fBYnHlO6ZgoIMfuxT

GQ89FZ3Cl19ZxdkkW+0aEaji5UYGapJOqMPbXxYeBqVHMhjPBRZbbNEwmnhibx3x4F/6wEG/4Y8LmHfJ

nUR6vX6HUFKBzCz5+x6SwW3C26sBNaIZm5vkiapG22
AJToLl+a7Y6PaAQkY//fda9QsaEhs/J0miWI9npF0vlSAjWWZEqu+d18J/HvEyfomMMZtek2boUpKpHZ

rXsyowVlcuO6B/42L+c1z3BFqV/K7lUvLarFRYy45cgbcZCdNtdVp1H23YkrZYhEL/IFX0GjgaXDX/dc

X7kWZhfgl2JGkuQyhAQJoOdf+rzM3T8TyyK2J9g34s
Hhcf4tpzR2rhgYU2c0xTrgHTgy1y85m+EPgcJQS0fYCoTWdudEqUEZQ0dk9g2yfqHIMl9Q/0o8xSfZHD

Ulq7HI3I2WxA7c8PWiOR+YVrDhSy0Szzsb3+cvD1VinO6mmdVa4L7iN/AtsIia6OtM1PakepXrDQdmTs

TEE2l27fcaEOw89IBmDmN1Evm61k9XgcXGMX9YOiFE
7RROSlNdepQPS7h9gnugFoaHAKhA5RLBZm8smdRKIpXSS9mibzDMfceZGVv1H08piTgLswA3qh/xv50S

qBzP8dsEQwOnzTrQ+Chm23VY/FwbnNOx1j38AfYyaOGyEyMcZRJEk1qCeaQUJ/A1MYZKqWfbm5aNcLPo

SJxbgbus5X6bKhTxvvydWzklk+oYbbGvI1K577sJPk
6f2j3pttU6Aa0n/LHJUT7y1WHyA0daUYf+LWzbN+AD06CJrOzvi+kSByxIQB4mN1K2qCYJ8Iyp4I5tQF

BAkc51+sroaX2mC43BpZ17lE5007QT9+siKOCWvFJrYiDcg3KxIZYJ+F1Q416kE8gbVV2TJ0mgLfGx1t

kYl3fwKE+ulkWqUWef6uDzo739ailt34JwmxYUe1K7
xAAHS2J+Cv0ybzlyPGVFqsPBDi/+lcpaDcQ86/284ua6JBjdA1KAC86qYmfwAQ0vuMqGA8IhGybB9ef3

UAmX+XjE3L6VYy7gM8SqXDvgTelvrI5NtQSGbt+oG326Te0E1TXV9/OxYZapRFBfW01tZKAtQTUEAiX1

Z9Qep4AfBdpLw7UbZFa7+StYvSEkxt8OHNlkBrbxVh
MoxahJoj7gmC4wHQNxLPfoiTEcryvL2H/k2JWx/ViPmsYblE2OzX+JkaGUekLB43b24mM/Qlm7+toqWf

wKTM9OB00p663OV3zV+4G5ANkFeff5hmkncfZHdcfaFcoJBCfAwlp3rVhq4ahdjZnk+ZwHzzOS/PwRGW

MrOAYpXkspY2JCCprnL03AMKeI/YF+7TXJZVPy6IjA
wn8519xMY2PN6DEJsSv8UhPEpgSy6TGGKemIrnDMlWA0+qRezkio2+aVUBXiCxSWgC5l+WYvvu9C1PLj

n56fZeFeIgXCZ5knC1MMF0y/JEjxANh/DRGRq2hWXJqkJqIfko4RBopIzZT4b55TVsY/P2fP5swoffrl

LxWiBruryJjPloW76HhAzzfrfsCVk7afj+Obqj/2k6
oF4X52CRaF53BWUngdwrhxFrhhQlxi3LNeZ34C1OOBxSeNqC/gQpP+URgczLsThfPJ2bF46Ql6ry+xEZ

wA+hSXW4sx+JyhlZDvxwbpuiiLJEd59iJMPP2IOHJiY3favcgivz7tMDvOfUhDnayIYDHr+SnU+lnzD1

n5JwE/yIZgyE4F6d1qDEJBJ9q5OJbJudOOD9S9AN6M
cIHhUflZIp8anPcl3JYX+sw5wKynxNlEZ2hA9bzugQurZQYkID1lPpJpxK3a0sWnGkLogLWEdm6Gpy2E

4mDl1uNqsl9iUTAqQP83xn1WZp/4d8yAOq4Qz19/T4zjvlyuy5JQ9Oa0RjMtgo8z9kiBCo0ChTiPgTGV

bca+rHbbjOgXm2+VRAbIAj6CzR835UAsbNs5VWIAfC
kUGjNBHF5zmlfSaQtbPX1cYreoJhxnQ11uUkbqFKpcfeDbQmoHKA05XRzk01a/8TL5ec++3+DWkYbT/W

eTAWPFCEZ6vnZGsCJ9wDKLfDV2H6Zkpi61BJlWg+tUbSXZ9lwtS0j2YzF5wntYQK2E5EvhRu+Xel9hNQ

YRYx3Hb8+F2ZSWNXYMFQiG7iP6U0noUgwXdiot9hI7
CdjKBDtBbtk+Iinm7iC+rn2EZArpjIybtEun0W4fTSgfEvJCz9/VuNU/9s15JnK9NFbCql7OtFAkVQIk

OO9+32togmBUybvJdJggj5DboA3AWiTvdMy8y+hBZsxEPxneXM8TeIKw1tkNAlaG8kgW1LgQvIsTu6D6

wEUvJ0Mep7nBD+wYJLEF2d3s7Ogz6L04WGDZb46Nfe
M0Z/7cTqEzsgf7ZjnAhZPEJ78384hPP0tuOsNU4kCNmtTrC0TiX6FQhhzJ/Vx3223xcQ3ME+v9YiGe+j

lJaiJsJ1YsfJ3vvBBN6FQ/s1nw0V6+A6C6u/qM9P4bADUv3rNePSycKCkU4c61fcM1ouAjy7NmIbc5mR

O0y5IstNHx5AdDUtXRaoXRNFpq6Q2N71IIZQR2Ly1w
qkQ/Fu7EhfKs1qk5Rbdvgl85OcBTEsakNAo0swN19Jt7EvIUgUfPKKe3AArR1coi9D0nvOSLq97g0ewr

sbx5/B5DuABlcufwodt5FZ9gWKeyGpVQHc8rQpNgdjMpbr8sFhBj4f7IGn1rUmloLjkoOGQ8U71dvZAq

oV4sr6zi/0Z67f1Wsm2fcDjDxVLo5Yyt9ZVXdhjZl6
26dw4stzy01y/UzTX1gtnCFH0m6QF52WUa8A8lkpIs/eq5W/DCKHkCLE5KOUrly/4XqQ7pJ5QzVY1WF2

vA11T7TTMI/SXlY1Pr6NiF/hKos3lZB0uabV8kHZsV+nsscPXLDXKXezH5XUTf034qOoJ9JbP2Guz2P/

l36PEpACcx2aAtdkmU8FSqm8+kwBKGO4EyOjNOdnqR
odoqn1qPL8dvWgV+FpO6/fRBQtVlvgTJvaer2BrY0/145cnDMffJ1bZOs1+OCiM18MYgdtfMGzvjRuFR

th2c5GmNlDLOXZbQtgwsVTTTlQTj32SJ9PSjAbaVepvkca+3RYlCarL+UrHWTHeHyR09UXdX2oDdboXU

iToEuDY0e9SVl/V/bknGINFzkjyEyi12SPQiCcIFYV
i3vv4x81H8c06Eh1dodBqWkwncHIw4DtvkZhnyZiI/Rrpg2jrzHa9z0s/xtoX4GrDGgRfMowBkf/FRPw

CRazNtakVqC9ihJ2AoY0zPiGyPtQQbuql+l+1PCW9Bhobl1gh6xQLUItDi1csdgt17G7DciwLb15amTs

bu15Xu92nEPVoWfaqDjsVrG5/fv/TS39w84jS7cg2y
14e18qRKS2gM1rKKm8qn07B1ZV5C5XA4aQrWIhCyf9YvhaWBL3DOgPTBe24jN6Ob6GSP929qHqtYOiRR

u9GW5Rpnh2rJ4jjSOoKD6aa/AGjrufz7Hd3VQWwD2jxh1CLh6XhJj8Fv26Qk9VMpRHqZ32OOgeqHRDzu

ByZ1kN1isp++NNntxnqQJf5lVjQpGv8j9Kpi2lTgl0
4J2fmoi5wFuo6FYscHw81eor22dD/PfUK1NIX1C4YM2OXnuYVmsH1HEp+VbFj+OzLxLVDxGbcDG5meMp

0ciU6h9xoHav1UbgN5phD/g0IdchAZSX34ElcEIzYzFAT5CczmK1KoEmi6JDOuiYpm9I6l76ecCfk2L6

o1OSuAtlNAnxJaW5P17ZgCd+Qgp23tOvJCyetsTIyy
z4nPWX+S6R+Q+6iTJWCacnt2aasdOhzft6CX08kNdMsMQVYtNNRjDumxaxDNd/UQ0k4cQwLa/EIk/EUB

fLtir8wC/eCGtI3WpXQAJklER7B/8jOsbAvcCVl4yFrNITyQFPa2g4xpK/2AJ/wNKz22Mu13sJYt4SoM

/+1jD4Pu2gUqwiNUjgW6KpfdwKKQygABqCXH+7Dowa
+LLHw08UrKlmnlwvsImNCUe/ZcqGb1rcobj/CE/FlVRKmfQd1JxAuS/B4Cp4GirPX9nvIRG5fg4mVXhm

/zGyRQjQ5/agvtE5OyMQ9+gNCHp496NM8z647Eiev/hCdsJbi5XZ5LNCrGJEb4PUYkmkU/VvwXSF7TNl

iMCmmHURAHC2hWFEzsg4ycJ/YREr9Mo8BSbrbu+eSn
ypUtuY54LBDPhAif1Itq16NRbDhJNkRbK1ChGjzkeuFQz3VpkVCBcG/RE8oAnhTGpFJCQnzwUyRDG2Jj

d7zwTRMpI1tM4+5JV9+UU/c4FjRalkiUjHqxSt0ZnJp5AGLDbpQTZxEm8L8FMu+bn1gXH1f5R1Fz05Ze

6rU8s23rC5JbVsIIs3Eb9u84Oe4Xc23EOtQQbjtsZV
n/X6gqyYJOEGw9cjV14IbSmftGRAGlnzEjTeaBEv2+5w8VKYVmpC7lkAtJgKGvYjVeAuoXybc3nSQA8L

tUsgiqjHyouQJlJPGZWsMkbmkOoF2qyOShnzc8pdtJD7Vzr+MytV8bkLiy8bTzjxSee3xGEHeW5KRoOj

Kn8MTL/SGP4fFIefuPnz4nb2krhlKtnPUiL/MlMZsm
gM0gqv9ZWb3gxVXfA7AwNdNWubTpjHapoj+8DQAyNrX5lW4qp/WSG3wtc9S39i182+JXv7PLa/7Ek/FU

0cHvqVZgA7eRZcI/N+kzrw7yZQIlnPdkbmgWX+dHTh1EFkAQns7njiciI8Nx4t0Je7IXJlAV4icKyIv6

l3pF/+yMevyMb/7n36oaNGX43rY2d1XB1hQ5m/dPjG
JpzmQj8bS7sEe+Z73//f3ipAzCG7SN46FUUPDWdMh+6F54Z6Gsb4YMmk3gvPcMG5CpiX7+DYg3QEiu8u

MH5UNrXscZ3ZvIfDseknAvDImoB+lEdQWfXflLj/3Lsxar9/5IVABsX5UKy4twcvN/aJ7bVgaPigV9Do

UBk4avBzLLGqmZOMr95Ag/GjDlEUliGJbA2gJBpShy
Ph+iBiwijGKKZ77bFSYo/5wMUzMq4pKs+0l/F6A0Qel+n/P0yD89oG7+EqsKIgKknmCH9ZRvxTCV3CD0

nDJ8Xw9E2q+86VpRfrbGI7mPYWUUoNT6P+SrFUKe4GpF0WRdVXrPqFpD1vWm05jMX47wSyN7BVVgIxAq

aGCY+5EZz/Tmp2N4be+cG30hEfmETICD9IIBrhNE76
wBzGx51QaKfcfYs90qh+KkdWccRDvEHOU/OmDbdskBQPc6XW4k+jNiy3uoZ3H1jr2TDRdsGCRGPyrGS7

KJOUaujgaZY9xOiiescfw8lRQMDUzw4GOfWjoJ1YyEKlgY7X48wUQSyI7J5pe84VgjetzTPzupmp5tHA

JRvzMMIKSVEj4ITtI24/aALqkUTYWXVegvG8orYJj9
9ak0urCXvy1+mHyvUPcmfjAHhcuqIKfGqxDyB3ZvYv98mxtgwSbUXqDQEnMC+BvXV69prH1JeiN3b3Of

lYsWIRiGk1FXiW1wnhW53kLBSHWHGSeZEdulMxAKpcGUE6BL232e8WVceCfir6Q3Oh4PzHiVfJ9t8vhV

Ln52RhpLIhs6XFsgDMhu8VWXtfFYg0BLUCelU9EOL4
N/Y1rCrVS8uzi2DGYEfGZRPC45kJmjYZ4jquMN1pm3gn8pTQuflvPkk8M45YbMK733Z36K8ugD4bMfRN

EKcePsCZuc+hbyt849Q270zBlE+721SyG8VmNh+AaX/CCQ2dTNSgGrrWsMgo827CAfWSYPU+4Hl4OTJh

ddA+qhC02v5jB0Cw2ducrKwIAYE8yXsFPOdo0ytDxk
f9ANmb1RbV5Uxkht4CzFDxAuM/iSzz6SNh3dOjLNpT8duhO87X0Okl3AG2Wo2Ai2P0oQPY68IqvkQbNG

ZYaJEUXnqGVRRz8wwo8eXGSvxp8djYOt3IwZWYyjNAgHxklMEfouVWqHPmCuRAWPQAx1c/GcwOntGPWN

yrED/l8AL8Gu17vl5Ad9MIdZ/KDdqlsFNMdehIwoU3
6JCB+K0SoxTk6kwHQsgC9NW6q6dF9ROdkw5O+MISfVFmJyz0bhNnkGVlmHX+rXx/wVGdjO+kz/0eY3ZP

8lMbCPjr+ynIBisV9GNN+wFUWS0YyBrkqME+pGO7GojeRyiSEdKAk6tC1+d5MFqD9WPJxi9g6pBMIxt8

sLReD3mJoLd9CDSjjckH/Rmv35tq+/3Pj0STiOK2gA
eD9OypY8FIbdd6Q6BGXHH+BWvOWTDidVYYMASLzBZF2ugMMqvvuLMBL3CARTnSKk2GU18J2n6WptUFl0

cO809McExA50SGZhRpNb8BRYgvAE/2I0qdnT2Gwqaitmml344im4ztljlgwrHyvwgU2Hl5HB8krpvByO

lQOEWl2alwo/ptX2Gwt1M3el79qdv1ksh+9d/iCJb5
6ig5B0Vccznk/TQ+ngwa2ShxGtnCTubeDQu4e0ahtg7g3BXSZd/fCYJkKGOR+i+SJFuRbkJvnYjrAB/n

1CXJgc+PHP/MuZ4jRFm1Pye7y30jkzyddHZcYtIz1xLEUQJTi2xqTqQdiEO9Yp3zaGr7AaOYSVZQs6dG

o++/ybSug7iIq64+Ud/wnpcG09JBNLdPp3yHdgGmTY
Iz75izRdUbKihaaUrkf+cFuuQFhtMaBndUgdUm5Nki3aPMiuea79aQGa1d/8SoLNhQrWrWsyWdKdoNH3

+vLbdg4VkYY3OfUu595HWJpCQoSLqn6RGoPTB+0zOpYqRyj02PKXJyEsAGcKQxyf3ZYFB5/d3svOm/gh

VmD7xP6ZVSeraAugkTDvA/u4S1aHGNt6qH9GBmMb/7
JgaUV5c/UsR2z871hUU+YSieGjfdVWhCeknjZ82P6s/RjtDN8/741h7nTSvK5FyZR9JtMMECF5HwOEUS

6s7wpdgD9GyGVj/YhjYerrWowtMJRk7ucQDj1Mzkfi5V0Ob2/P3DsXyALjun+NX5ETsL5lz+Oyz0saTi

pIeR0spSpxL4QTthDT5OlkZNqW4Vs84dsRgnpKZ0h7
ekhGHcsMNDISdzm8qZXhDg7IePzXFDz75KSYeJegP4P2f5i4nciJR5ypKO5D2EBfHRJSTKRrNEkxYQc2

J+DdOVVC2ivEmw+rM/+cwcc6j3xwVAO1FKIBMH0V4+9W2OjK/x4eTPq10iV5VTqiWxUeHNtZp2FTex4r

7v2BtGX8Bz4OW+5HcblxpQe/fy/Eo9rmpe10LzISrr
LpD0eqEesZBWEKd3GVMexMM0mXbLuLgN5C4gBWO78ZEJZcN6jrfD9T7yxJohDAPAqW0TziWtt9TO7ULt

5/2zjaLMa3u6R8EepOIkOlK/vGOslQQxFG+3i2C2JcXRTwS9wDDIe/cf/fqh5Ow/LyBA2ubEu+gxIrxy

dfnnpSVRKBRVbTm0Ej97flP2pmQIXLj75oeEt3LFKY
e4PoVJ7WdHo7l8VE5/k6xVgtHf2Tn+0vuR/EZyuujl3RIZ01g66ib2BHSPrdzQxTu2SLUksHctT+Ia6k

iJzgZ3uy5BlEWRTSwGakQjEVQ5dA9k5947t9dasS2nSmM5SLheYDtd/PTGtfX7nYxwR2LO6HgXTcQzm9

B/I8ObPEugbTU0CzstOB/pbRl5UzS1VbosOfYdTFwO
mBY+isrWLaj1gXmqzlBrU/DJKnIfl0wngVx+iWLzb4YDAWUfHhssrszAvpmLLXsxpa1ep5ukSqlIySJ2

lnl0NOxC3tm/lYm2q5eewp64WV40/R4f/VJafFnIXeJa/av+0qro1ZA/6qV4FJNH+dAlviffybbql6pR

FYh+umh8auprHZ92+TRlMFQRKVgV/Z9KccokHgPPS1
0UsTe9YwVv7i2bfsW4tNl9yn4wbRZqT2NZQ7s0C6oF+cDw+utUL/2CI0upZfb0YlBXxQWIkPNGj7elbm

XwCP4xsWxh1U1TNrLT722zl5zSQL7EKGnmKL+r4qGN37UssL1HqMI86LUHvTgUOiIoLqyoxi4WMcNSiC

0gGyb2fTrbF3ZkjgLB4aR8YC80fJgjb3cbMxunMvux
GRdlAnWdiyufNQtskf1lcmit8mIN7z2j+xTTZgeSI/hwaYT5geQGmUUz8FlJTHqRRSZN8Gjd5JV+4DKb

v7eFq3vwpGSl9W0s4QTvqNY32rTqtN64/xuUu8Qna4arSxlS9ZO47RUevBUZSfPOIuthP3Rp2rtAAzPq

Ump5KkiPVDzQLPeR+ufDgkeyTc/648gEAMXLo9wtqF
1ozy8AtwnaiXSs86tar8kLvEn/MLIWX41L7DBsy90ir+RGDdJnfPXiCl9wFF8MchzVW8r1KhXCIMWqG/

+ErFx7TuvrS2m+bfzl1zfTlP3GlsTV7bOQUNuxBX1zxaZpbWtg0hkD9dBg5actBOLxiRTJKwWuG2gK6U

yOGsll/sQGw7BetLgX0VIdDbHmVJRx0yyQDfhLg4Uf
Eyw5cBe4BkNnj3WIH8jDCtFjvo+iOIW2/DkDqFFCQ9D9ovW26W4AgVg2BtOxjx+31W13a3dfvlGtnb55

AxrLYFef+gaXeaHuTl5x+7CnZ2Cl8x+VFplZfheMmu1jeu/nRwsDZQRi6cVThwpmmAAXEo0h//8KWM/z

pgclrKh6DgpVchE1Vcauq1xqNLG758SMQP0qNXxE6l
0fnyBME5esCtEFV2lZk19FiwR/Z39nRcxH7wEBASQiHDwMQnZZirh60Sm53aGaOX5aKu480wqClKUsft

l+kWQuslOYADwSSMjicXUf5hrdV6lq3caBjon0CtiUCjlYhEZqE6ghD4943vODeId0seDbPJCOu1LJv6

Y99Aiv1OF8HbStigZH6Lzw7ttD8XyOaBcty/i2mQ/l
2W5xrsOmUuF5vjFzfdMSFZBHZYGNosUDpsU1HdBhR1oGen/DBNbQjv4FF8H8QxNd0G5r/MFqR4pVUU2V

UQk6gJRIIV41lAbP5ml2xYmFwYBSzHMiw6hS6CVQWZHoKzaQ62U2s33VFNJaGnGK1G/+rU2Y4m959Oyv

ZbMGB5qHHREmzxj2mCTtKsTwn+ZlU4WjGP/OfcFEdG
u+lElCEABukbg5VacPoj/hz6ToHlMoDC73kRsfh6EL0aTipAhgELkWL8SiCnQYiTX1HuTbx6hpPuUOfu

P3mDn4R6K9KEX+OijcuP6eUOvMefYPlnK1G85LRypNELYcvBtVlMaH1h8+bywPd9O3wsuth7/OwS5uA0

FTW44y11xbH0KsDMBld+ZnGBMLYONEmH+WDxR2d22a
O76Ad3zP03NZ/+2/RVsN0tGt1WKTpRqxnCn/IVUSUJDdhtSt0iaRPwe2K1x2BQvRG2gbppTYHLRbYBGH

WRQdxACKQ/wFIG4Nj0b8QgqnKIXd3VdA87h656gryrOO4dxVuq++4bOIcgkHlD09cmrEQqdwXfy9xnM+

AlB38ARvOC/1f3eLHXgcNs2a5YTf0brAeVoRtvcJgw
kXLnrCgU/YYMrKGJG8qe4GMXlDsvhRm5i56yJQ0GIQ7KKO3vw+npCb1rRGAvTw/hMsIt0j8BdTNlQl9O

mGFQxFq2DcAItfdpX/SzV+gbVopyjzlaN8swg/u4Svm6lTZWjI0jB+lXutcrxbNSnT+u5/LR6FgLBKcv

sthiM/AftkXndd0ORr0VR6+0YmQ2vBK4sIe5owfJz3
xXwJm3Nfuc8sODP21PrXC3EkWe9x7pgGF3X7WWXo2IJQY0ePT9C+rYRjwcqeTrtOVJ/NVrC5GPu3tGfV

Eg5nGLQ4BSZg2RfkHl7nO2OCdAwcSt7kW+VeRlmXEquK1SKltUMesZ7e2EDB++3/fs0EUTflhtu5w/q4

XhA4eKqsLpWT5gNUlQX7vBP1Z79qMI+kGe1IEB5kSi
KYeK9nKVaem6Nvbk/FwrQMdeVu1mzEDL8Yd1B+aDqFl3gkxPsMEwdj4uN4LkmEI8p9K0oCb5UIeQhK/H

dr+B4T/LSjyneW9k9/pP8OxqC80IzlcAGh2qimlPMybt6sqOot8Q+JPVCIszE+EmvEhjMD9q564M1/lZ

8x8pTjKaA2Sms4/t5J0kCPSERP7oWuCFbN/XuyIax6
V0fx7h2zjyma25SnYEf2HHpNjEah+LAolcrV8cLCPftFLXaMAnWwbe9j/D/eIF24cjoXtFvJCMERyVd4

ieVxbpRlBl7eOpCTT9kbdFPElrDKVY9xJ9txTa1DjM2xwO+XmP08ihmBQaXTtmjHnkJIZB7hTQIEjDFM

lMunMqsx+9vJg684yU2huSLJzqjCMCAEstcCV6+1rP
w26s3X35cjoXamhIjgUkgI7x3pKXOcbUzKHF5XkhF9RS8t15/Z9pFr+a0+mrPR/7qw2BvJMHPEVXdfgC

Z45Mk36vo34JAcPKZt+LgdjWEb1Kog6U8+VfAQ8EaBl3YhqCGpE3UA7gEsK4mKVKjmdfGc6VD0KO2nfW

Dhzht0Rd5y95waO4sg8WT6WvvJnWqFWbXvCrUqWZYm
x5v6Gxbi0SiNBkHZG6rL/prFoURaUb9XkdgdvAJY0cShV2Up1rC1mMgV/3EI2/v/lmfp5yq/rucZoRco

yUsZ94pczCr/cVwzST0edl6ilhUA87S+biwX6O/HEPAPz+RT6JIUZSvypAaFmvb2m4Y6GqRV5nTnKcvj

EhRRIJW3qeMpx1SjnrrOXsksNiP5TZEm1O/CmYPc5l
f0uA8Irc4OYeF590yXbCpjjqeQ9NV8BtA8AI2OlR1z05PDqb9PgU8Wp1O7JWdhAmCLTI9/Xv8bkaSk/Q

xm1zUyZXLybCUlK4+oB4VC6CsuDWl+TxijdwOZw9Cu870au4gH0mutndrrlioo5vlIlnp1H26fWlzW6k

qCtvQGJptp6fP2n/DT8JmqIj1xDsZchxk/GZZZ0e7G
swiphTFpQA3LpvkHNP0W4PmiIx8k06undd8kFJgtdgG7b33GV7B8nraEW03sygJrKOD4tFuvPmoaRycO

4yed+Qlr6uNYnYlUpp+3uNZ+UpQB5ZpLWvxHMlz10+rr/eihRbc75LGs1qLpN9XbkcyMXhDjRTEb/Hqa

3RZ31IQM1ZydXO64lVR/85xwHxzTffshMQh2xAGjoU
oL552mNWBjzSKcRDkovnbxkHI5mCZU6fkVmNvlD+jOzhoJp5jUT1QR4UzHNgyAw3JExy30l6H/dxvDCS

K/iSlORWqnW+fLE5WkWxqbIm5GU0chs6Q4lapivjZrJ4fj2uLedzIT+9ZHpZ+0uOgyhYepy+y/v44K5i

4gQqZTOL8sEo2qLJh3L4spb4yIh9UftQcGPnhImIQ1
3eQ5c9o742UqqxyHFS60wmW73ObKLwme6TdasJp/SXaJgumN3uW6rt83105wul5Jwy05Qk692nMejME2

w5pqh0IGs1AvQi4NMQDvYPfXCzDWtedYB2q8kF1+X6JJRbcKLwKIDGMjwvnRxB3feg/LFt6YBbj3Jg17

sTmRk2fZxNcVj2lvArze32+W5rXraccPUC/aw+OPaT
T0Aj/wly232Tae728cSNEiEVcwEEKU351cHibggxXSuLObwIpC4+vPl8RZewovsDNQ2UVqQplHAiBxN4

NVrJREWFJrZOXED64zop3OM6nedk0dg+H1EROigRzbfubalWILEjDiKs7w+iXw8a3B7LiWCAxPtjiRf/

VWzfjbhlodvgtQWeIc9TS23bTMYnzc1Cs2a8MN6mmd
Z5sd7WsYw4k1RNXM+IJz8oizT3p2D8dvbrxgAtpMjcsQvItEQBfxnNbmN6qcahkUBw6BSdN0+zE+FhTj

3W8EkZLlOBYD3JPRO5mTe0FgP1XlgGh2D1yZTIFCeRUTdwNR7QO363SXZ6yWwkcZqx3hqqQ506zrrbIy

BUjZOl6eIqiZ/kc3Q0dcZJ668bUSEr3AQdKIy+VqR+
0O4VPkH7LnnaxuHuAnXo+rUuGjcb+Vb/ZXCsxq+PdfmbV3SedS+jNF4vst1GnSAflOaq+d8xYYjvb4s7

SjUfXqreA5Qo1j5WEqLin7yHIOT1YuEHzfE4pRQRupP9ugrNs2GpT4LIPPQF1dq3FhKWv1dCiMN8kQU9

bgk66ubWYyw/5Ziy1m9fFtPlyNHReX96VjrZAku+nR
pTAN6o0+zlqE/3EhwzRYpovZmGN9a04Mj+XzYEw1uVXjkdLZJqVeZhjw+NmiE4nEFbeXgn8q2GKkK27R

Xb2kPb7mSX+QCU57vhL2CQ437UvwlJ039f9K9UuEzPGyuY1NRO44sdjSVY331ZxyGTglfWWKQwilxBUq

8Lc106Cfgia8fYfTsYuMgoyMgaX9KWrojseG3jB8sS
w94G55cgkFrUwm+TO4bm5OiI3z+lJy6+W3Pfp+GN/dqCxP8C6JOWspWqsMWvF7Rjc6AxapbIjhZRYHV3

S7p6X05ZsOr2Bc/LpISTRSZPmOYylpIiJBGuvn876tS5u7hkJ5PBPsr2F5EElrlXue7eO2O/EfNk0gY/

EKNtRqxeXa7kuu27Yfzqr5GD6Ljj97WtqfC2G0z5y2
Nkeu3AVvHduKcPIuSqZS+1bcPBm+gzVG5p0wTSaCd4zcksqPdrMAyFWgrDS6vuAF5Cprer5JtuXcpofr

VRg/2pGLJX60GIKZArbiMS9yZf8kb0hD1KdGlfkBpahBxGty39KqGwElCcFaRfnDih3KTuMqABxvMsjp

LMIz2zxkJixwq2FpSz0vfcyIA/RHm12suw10Zpss/x
k5lTIfNbzY8R//wnML2BHMeDx7Yw3H9DQIUEuz+vKn7uhBO0CYtABG5VxnP2X9G4s7xNgzNa/1ZObNk4

QonbF3NVDc1UR3ihrz89z0cvb9woeVcmbmQ6w6qeSIyaPuK8oVydcsC+eaxiM+nYW824r8Kwg7OjhF7Y

EuOEGMhlbJs+V9lF0BXz4s9iGqlq/gOKgGf3DoE8+r
nn16G3EXCnD0Ey4mTF24vvfLMSlfgdbCkQdS/P1pSWC3gvnP/FigaSjm+JNVFLFPs2Ly0GkksZebEd+l

10mnCP9u4f4QgGDuSQC96848hU3fj0hueSpW/ouX6cdS1ks72jsjNZIYXO5bNZyWrmWEPy7l1X0dSIfq

6DuoANbysUZ1pOVutBshtm7mWR8R4a/PAEwlKvDBw7
566/GsJ6J23Oybq6x4/9Lnyd6vjaZMqI074kDmQ/Rrf4fwzudeRlVx89immQGmUszGeujAsIsN3sxKTg

w95SfYm6KISCYVzTT13MzRyHl5LdSVQnuIhgwIVW+dO4vF0/NgFS6B3J+XYtLKhHjyniRmsaq/IHBKyC

3xqHnWVp1klm0gzGUuI2K+S9P1mlQI4Lw4VnNTst7a
5wZdADIO4YjQq9nEvGqXOyanVE4cAiyCicac2UZQczBQ+YaG0ftqdePL7SeaQzTgAQg1PbiSXHqONEBz

F/zqf//1hesONrhk8T/rmT/twc7q2HVCN0VLSDqxFw/sgWVDhWT9EQ2W9Uig9hvJVMZjgWRkf0HVi14A

vS0c5zWQy/3Cc+ZGsNum3aQeY5NOvlqDvBWT6cED8S
xXwzKwTp0V0bpAv79gHRjNHHfguw6QvkcwRuXD9WxvQQwz3mNnhvy6zSpptjahb4br+II+X7sh7Y/ryP

FA9n2pYES6zQAg3xPVePsKj2RsMPyWx53/B3mU5UgI+E3DPWDOzXrxhN10f6KkAKRwRWiULT4IT8dJbL

H42Q2EsIc/Qs1QLSLPsfabplqBlu0SnSb3jv21+smW
KxXfWnAIqT+SYZtS0+0D6H0U0gwVP20eelZlf9kjhhV15TKCvv8rf6m3LkwBMGH2LrgFq5nIaEmsa/3V

6RHgGSSds8pWgJiITZNnmSgD8QR668zP23RWVMkIz0JFiR4cdhPwJZFBJ3rNLt79oTY/fwrky6O8SBkq

VUliQwsFXx8IAeIdLP2E+aUaubQcmfWDZMUqdB0tH8
Onk1ELrheCXOhYIwHdJzBO++8PYjyrMdGscGnJtXb+gpShIcVLw3nUeDL9xxA4uXn8I8OYMRg4GKyPce

r6FD4Ss7wEChDewKBHiVnlXiEiO29ZwI1819AVuxb1DP0jAGwJVDCfw+GFi29pHIEEHB1nFt5oTuk2Gt

RSz2ximVXZb3MLRwSfcFfCHpNorqMcZ4IztZWgB+Q5
oa0gL29TDchVHGTPjQsk0MsA1FHzQj6CgB+Nz8vqkI7yUmDHerH5Pt7mFkahODXkB1sC352c2q1dpdW3

3HlG/u9B3hOxu9GxVFhSVmBXoD3R0aN23L2jQYumrbRJvvfoR9+u4lpi/HGjc3rle1sBvx6ihKy5+XdR

/3B8XorywfDtHsu18ezFzv6XPyZSf6gjsLMlTzhHlR
9zl1H5HBwDzMElT3mrX8DQS6p10oNw1zNshfsBfhbJcORLKOjFuwWaefUhC3e1N+FUaafqL8sfr0Tq2m

jYK3HVB3B6OcHPFxqHpYOJGx4Q3KJdGxrW1jDxE4Osawhj0uquqwiznxA6T7BIQWv/fdlqSUlWnkTsws

y08RFDvF3ug7nwiASC9cQP8eKIeoJhQy7Zi7aAw5K6
+wSUlFRARVn4z0+Y5LNbB+b6hduSmObhQcIFo2LSEyCpxh5Aj7mDNRSk/bms37GVLQIhQW5JuuLwEFXn

hMF4khe71mMY0nDiGAaTEBV6MFrSXFPdrlQApVzMZw2XB5zitHK5kqduqUM/wI7gzSxF2cxhS6ebEqbg

Z1D64OZKaNkhdmq0VKVcB4DJ7weDNWJQc2MZpalZ52
OuwR88jqclQQ5Jzs6YI0mVNeT80PjOcvX/ptE7TDJ53zKoDKLJ6AtuLvcZ9qSsyPRXOhiIORfcGAtK13

5anhNGDI/HbxsD0m2zbifZtATDLnnI47gvGRnAi4xBgkFRwcQzNJMTsKpUTeoPa+JRu90zXYAScdpG7d

9iRgNVzqcapX/exCaYEt+l96tkmwnWMqqqq7nugCs7
TKvfCdLOusQo7YEVtiqT97NCXlofSKKaHEQSqWnbKes1Pca93BuEH0PCfVxB9Dh8wkVuUqfrtjBEC8eV

2BY+l6rIy2XON1aIUnx4Aw79DzMFrwuOtj2E4vZenKFUUJg3Hre8g1BUPXcazp2fthz4tUXUvzlV/WV9

kSxu8gp/hK9hX+qLIAzP07bnTVYNLqFT9bzGPZBhr1
BD0L/hBrvP1hnB0tpBI6mKJSCbFHr3I2aV5pMnExcnCzE1wdvmdoOE7Wz42Hulx0dph+xnii0TXdT0ej

Si/Rw/bijJy9QpDy+sp/rdiBy/g8NU95rz5dZAYpUOMdVIPAeKO18cnUuvb8BGA/LvqB70X7f0xGpK9o

DDYWeQ3cWQ7L+sAif805J+41u+4Kj6JuGVqDdW9XDW
4sEtPgjKaoDbLQja6AzNTkhE42TglhybkoMoPDwUIhty327PhxksIcagqR/AIn/sJhWGfqK0M/ps9oUz

NUan//ccPVOs9vAKn839oN9M3MEcv0NMQRBwb2SO4J9R8rVrrE/+zoufdwMWPYkX9XO2UfKJyWYatZGY

c4+u5uOXk6QuzpgG2lPhYWv7JIaO4wCUSUocFHg3Gb
58+u2ersYSrAqFcCyO/sKE92wpBNzzoVrsAQ+wHrUL/106KPfmCIzWH3WZmNT/8pjSD0WDb2ORbX9w7Z

vdEo7wH90D1YdS2jkCn5cPjUp91SX8o6vnBjUmKz2ly1UtQHyV7gfHckgWc1ydaUo3s5j/Hcq3c/xap6

1Gi5eOV6RxDDyUO94yO1L3LIS7AzzPHKGFm/KB3+XT
AFRp612jWDSWYeJvlPnjwTUMCVTdikp3cj2JsZ1E7RDk4NNFNnKxqfNuuu/LRZRV5jJU+1Kg9GpjtsW2

dBggBX125TxCo/lR+oJQo+VaiUko+Xka0+fUfKhrK3vf3tGQRM3Xtxng9nqNSiTTUA0lXQ+wQr7jTip7

pqEqVoVKc4TuEhMnigxqrwBQvYq99mrE/AUuLwVokI
QQeHtfxCQLKKwpw7Jv6cs8BsX8iDhibGYjqmoYXtwFlG60nRf2CxNLn4DjHrWldlkq7ccsiKM1RPxvcN

Bbm0WjHaFM7qO58OkmYwsDptW1aHXFFqKyx2pOZ25GAoKYs/+mUy7fmaDp9hSxfgTm1nLNwQXgUCKxsJ

GEj+62CNdLrGI+/pG+/fPUFtjsC2WIk0mfLy3O6Po5
6edFQcQ8wCWqTy7s9eAcCGy4YH3mXykXMKWPw2m9tUks4FVM64g9QSPJ+G/cfmDlcayjR2jCrPPXXYGW

HMqR72cbw/9FnJ/KZEn5HE+MeyGVh/7KFuvAGa3fktyw3rd83mPqrWcXrheAiRbd2D+Fg6P2pMQXmBH6

wOkD3Hdgs/vrBpwYczM60qvesqH4r0+y3cYj0aZv70
ad5TZly4UG2eiEFFE6erg+z/DvBzkU/y8l5umwwDK1E1Ia0U4LsgSqd1zQpqq1TwlLiOErHvngRsN8jr

Bi5aSbE4KwRih+s/Va1/8wRUyWP19EFA5oJQCb6nbB5suAez447GfExHBLNHIohx+DysIBphQX1SoimD

oRy8up6BQCajzC3gPB25aXniYMEJiyIu1ax01COwkI
rdBJkkYxzVck9QGzoQGpGPwzUxuUJQYJmj3xLqeVjLV/e6L25qG6UvYpec3bI6nrDvDW8Hx9AlPomRQl

KeERPY2/xq5TPzEud0zbz+xmr56o5H+dDI7igHDv09RYty+guu6HNz9e3CUQJ2Vjvyn4IUy/FYLX408C

mgJqtcKopfme7kPPhvWSWXj9mbUzHAVaRMCfyBX2uq
HCbVKSiAtUSfztN/D5XMHtcCkq80OHd8Xu7yGpgIjHO/Na2ciVyaK2RpjX7tcS+915vJThc6fgSEEIY6

6+yeG/WF4Z4Jzwl+VCXCj5rQERRsh1m6POt2oQbowhDH8ngtJecuiyELW2yXRwZwmZH839zLFPpcRieR

TtEXpBFmVJfjge/rG+AJBUDrWlEOQHn3Jd1REtiX5b
WYIQqergSeMzvKK6r/H04b1nf50VqwAIVblf0GUPM7hBKFla1+p+Su7DOGq6jYH9pAOAw0h1dpML9yuJ

t4c9rdPvT3gA9/3u9rODtNlyQlMxV17oC6PFJy1IhELaxFsjoALT3T/UYISVE5tdUSeg3T1aC9czcyMq

yjvtaKk6ZiQ3qWrAH04UYQyKJPlPzsK/WQP8Fbh/cc
5F0lcyZ/p5sLeEm1g/D9J1KMsLNW50FNaoQ15UMVtL+WtU4XuZSNM9JhRhMA7vrvRjN8EAWRabqBV5uO

UV+/dlqktUlWece8/nv9ujZ9eH9VhXZPFi8y7EtPe+5LMrHzz9LuNNShncVM1UAs3H8Za0Hz82VJnXLz

UqWQ4RB2xbhfreS0CncLqT0P/p2fKbPlSjb71JfvyI
ChE4XMiaQDA4/QF4qa4ry96mru7jMa5oYhoFq4HU+6xEOR8pRGkFUBhKgth2cT0boTnijsyoKbPEtAj/

yTx9fF4g4E0YRiEz/Dariela/7P93RvNZ4Hst+v+7nyU2ZurWwZ6+0URjiFN2ub9zw4DxRedGpVLBvapFhVj

ViFoEQ/kOosFulza+nmmT2our4b/bWdvDe1ix0yNsZ
YkuRnJA1IwukZlra91Y5kHEOabvjNRwoN8EeybAfLFsN0SuaKmqNRmcKvT1F+U1Q/T0ejuvGISF5lLKh

+vE5653A90oso7hpv6SzoFsKhGoVX8M3je5T80RQ6CUk0CGcI9LWbCMXvPHfzhSo4Z86B476C9aFSLU7

iJ93bFX/Ew+Z/E1qR5Z2ZUHevtatp3130LANuMGpP6
xJZaaKz3zwn+urM21L7MdKov27Cw6zoXSAzzx5FAoNTtcuYsVt/jW2UkJmRVOX+ywejPtUlW5W1uWpqE

A2Go55C1You+Ht+Ox58bh+/CVFCZK4tkH4oDc88cZXyqwIu8ShNI5bWs8bSGg6l8/PKFXmgS42FzfA52

+cieLDZ375RtfwZ88shg7EyGr5VOcSghXjSSgMQUU0
3f1IKCAI/lqvqBnXANym5NpSXVdaZIas6dUvxfqUDP0gY2XACq5q+U0te/SyvVljldgaF/066JnSNl7d

2u3yDfjggjd8JiRJMDFVIKEaO4YWNZVl1N+Ebn5J0LghvXGNCJPnoQ43MG4Yd4KUEU5M9CG0lPP0cebF

0sa6XgzFotHb1hjOux/v3z+cSsVM9AQdOoR6vo3+fy
TJxC/sH1hMXv+KhAtvAL+4B9ESio7IE7Gxxj9/NbWTMFm+6Zch4UTwGZukD65h7x5FqBD3A6w2Qh+Cv4

B3wMQQOGZMK15hamZRzQ6hwa9aCLR1dmN3aQ73tqZDQ7nVFdEvPjR1KeQKb/OBi9yv7yY6Gi5UM3gahi

NzgbZd5yS++TuLqfdt25FkQjQZJTAcjj7NMNdO5Dg1
jYhqcXTurkDFbrf3FmPJ2Esz0cqCeCB59kf1332XMcCjVP9YlltXm2e79PaKn8o2783dU96AmIIYVJSD

G8V6v6rwwrZVHBv7Je5fz5Pdlnneeo943GtcxFS3JK10+mqQszqSkthb8lpkSXNYTGymwYQ9ZmtRzHF1

XADlvfxu9A2m+L8zN/+vmS90/hoFzY9qsk6tZhX2M9
fd1zGV+r+5QIO1IiJjBl8WPbsn9A2wjudR7a0V1EBkLNhGYm6tC0aLAo/DKKaj1AsYJCcvqfWZPH1D2G

5byJ/LNMYZeKCexQhLGu2Kifs6QrIoZ1m8kGRyftjPk6LikgZAigeWjWXLDn62kqy/8ED5QmiSCHmVrw

L97W3Sja0l9O0V/3tq47yrWH9GVFJJuaotP27SS7/B
xy0ZsIe/Y6aeujvo7gTFHcfL9+glpQUlA8tIVPQstvtkMyKvZZDDEH/CH/oMgeuFTDSRkwY8xfJ+rfW9

+HwZTCWrIMqZY+JfiFHw6w+gbIZFWUSpTVAp4+2/sKdMUuqOcFIFmGxL6h+ge4Fgr5YT800/sSpHwr0h

c9n4zUBZ8V0ariPf88xrenkaaEyw6rKFi38no7tm/C
1KT44iPtqj871MCNunfYNCpyIPb1ryhPFXq/6hi0s2ajoq3EUyWekmX+WjEPAEMVQ4ON39YkdVHd3DN1

XmhA9pjQlXqo3LIxtQMH/MKSRdfyHEN37vmu04l3LTCF3Hj/ajcXXfhrHBVPo7f4cjOXKTaQ14hHw3Su

3WyfJwrf4GDIVBQ0ukPa49HWeUNrzE+k90NmRf6Cew
7m5Qnf0TjpZbheAMVgScyAaDrl+J69M72rc1Awtqu2Z2Zbv0XBwe1O1Y9O4V0MpEymrx9ev3KqIQEMRz

gzkSKnGbMOxnTH/R2SQbzZGHRrPfjkoKHr4AbWdQA/VkaNH8/ODLkX9zTcVtGiKXzAw0uFOCCELheff0

WuI1VNRplc1vglGqDlrE+7PvhiA19jm+W8A5WQQf8D
SB7L3voc4B/xn/CGhMaxV72I/oeoJB6msofI0EVicG3RZR2vohoZQcrtAnl6xE0UzBjn4joM0FvT/lMX

/pbqi8HuBkrcwY05B4k9WZ++xZPJEZrBMPwC93UpGl67gfGbTqhPkmPaZaOHknlEp6hgIGo/3ORxhOk6

biprGlCyl/nb5qczTXxEp1Hd38rX8NiXJCMmKY1sUS
iIz34olvnMtkjgxf8/FRYyFe/G6Ktjccyf7TBwR8YINjW+xXCM5sR9zN5/4ncoVfrFN+ALCDgwQRVGCj

6So/miWNX6Wvlc7Fjg5IjfEN+LKRLGSXJVe4+uHiHTGU1OgrkJ9fMAMm0WPDWg+Sh3/k16LmXVeCKz/j

RdD0ZdAgbiU6v2JLA8huUxETqoP84OriHiWy7FbR6U
3WAD1jm2R9h9fmdwczAE0+HrfD08jYxZECVHdqPF8WpGMQvvaSjnjOYM6qUvO6TQzGgOeV7BGgpR4htR

q33+aA670D6kNv7L72vcbe7qn1zn03+NE75gf9P2x+gdXcCb23vK+WQuzjZ5CUdiARt5w3eMbR9cTyhx

tvB+TDHOTgbqLhIemhYaXT+dVDfEV32NhJti+SBHaC
/Bvvy7YLrYIWB5VugqddV4tT6Z89NFgt70pjzsmGm09E3lKusvDz/WqMGo9YoEbOom4m7eyn2/UPVNJ1

4w1zZ87uE9hCyxhA+OGh5GnezhI/Bi+5hs6ol8XDKRUFlnp0RvQDAXgNOLzdqOi8txFFs8VFO3p1XCKE

cp2+SpCOp8m8jU1dpenDHH45SEWkOt+7bdQwADf/NL
9QrkHbwviFHNB3wJ5avHD0zstqC58CQvLOn7Il87F0XK3yd74eHXglUxaCJbFP/lpIN+N4iEnLz6JVXe

gQZtlXXJimY0rpcZ9hcbv0KBBAdYikXe8ypwdma/ysp1flg2mdLZt8fSw410OgjAEY3ja+RZPo3ZEqgM

cHQa7BdnDEhrBBU3iNfaCIaVgoJg2oqYFP20f7U7qD
ZXJwbiZugdl0DGHrN8S8fYJmwXJodltOjqzA49HQobZqZ8189iCvTxnpHsckd3uLAHOwwPTn8uIydBUj

h13+DwasWocWFeA6p20yd8sTNi+OrddbXaAs7C7mRsF8D77TE10TcJs26ZLPG7KF4dwBSX1rpyv/UzTr

2BsRGCSPXV88IRx48FdIiRW7ATOaRH7jjx1einHE5L
yMI5kB/9po4f+cd0JkY+4GX58mheR9t6mddQwJcUAywLiEifkH2JImdh2UJYBhsmmpelt9fBrhr6V8Oy

F+ijIviIRsw8pzT0eRhXIXgU/4B7Poq+o6OGN+26j7ZYdr7wnV6C+acg3Xe1keMba1J0i5BXTYS50By/

vNIE4qHFcRMbUeVSiqUDP7ulOD3sVr/vHOnf+T/YV0
XSKi8ApLHgSSDNSpr9D0V9t4VfOhmjjtBczdVH/B2PQlHnuRYOHi2HOdkL6L9UXQptp6zY+J6mQxFRtD

m3d7wkE6MAXfgZ9l+VPGg6tpuVPlCqbeVFLr4YatwPRacjVbzhPDltaNOIbC2PaQ0Kn1fc4FPt6cF4gA

C5Vb7u+UyMmIh51kFnQI9MhIy/KLhZwD7XSmoL7rF+
PxGjOVrZFy+axTWM7Gc0DcDOSdkeFeSSkUggSat4YpjPzk84poiNg50qTKstxblCccJi2gmhl1PuUCP6

BkEziMTf6JjxGTveIFc71tODDdEtAcRt815q6RDgbf4SUSxRG4recir3HA+9aNeOW5cvoMyAcERWvSBz

w/itOqk5dDE7tBV1OGl3OaXigcPMgtuPqkgGaafLZ1
t/tBDcZFxl5vwLtDl7nvCfG/FVc4fOWMXTx+r8BfycNDj1rezsJckz+eCsKrZ5Xw9hkRe5Atil2gUTcS

MoThBeFNeeYTZ6+HVW3M+afoQQf2yXbu+Gj6l07NVEU8MWUMYpQkCin5jkOhi7M89xZq6coPhg2pf40k

Qc9eGWmDkYd3pl9ptaNJG25DqvxmPqbypXP76DLHon
rwBIs9TWTcs2RBWNe2uKbCYpy12J0JJdRHNy7fS2sePnaX8F57iHYxTYA8tIl/k9ttcOSsdPE7/K/Yany

ytDFtLAh2qeMpXo1yv/LkLvgwDtfiamgWpNinddUPyLZ3iqsvugtQrIVpf4btWnpuORgCRcZcy+NWuk5

fnG4opz4n+PsXIYhSjlK4nd75/CJALJepsYGU/lbr5
bCit39HpOQHH4SrI/38qsCV4Beo2h0KfJ0+w0NvqywB2AExLAk9OWhN+pwl7aw+1nwG+QCQui+c33PKo

rxOplgGaBmjhaadO0GLQhYKVY2YbAGAKacbm7uxxSaaU3C9lf9oRkhBGmFEBH8jyI0EjQMOI1+BtXfD7

xKaER7dIDs6oQHUH1wVwQJh78XsDU7PyomEcIno80u
WcuEpMf1SqAq8FSwxmJ5uWt+5tOLbk/9pDF23YTCI90gJogN1ARinHD/IHqgc5OWrwSlEAwh3FwfBTGJ

ZYHmxBMwyj0lujxCJsBrn70QNy/U0DE0Wx+eGzAc7/oaakAlP1bRY/pTGxj3WHl5LpUEfH1ZTTwUzkae

NXJgcuSyZOUCwsxGZDIuRFIE7SFBIIII6Fb+88z79w
Nv8GxHgk+408g2Fc12FE8RgdfeQXvOeVMKZ2sxhhhGaVR6qxLps1rK158yMV+57Na324b7Xp2xOq4liZ

f7ReVDqVtXg4pp4G8oE4N9R3D61j412Mo9/R/KlKtm4ho0ET9fy/MwlN1qYJKH4UGNwYtPtJQ+TalSGC

zYvIva75J/vXc+lBgH9EVTCILmzOWuqHb7R2AnS1ln
akLeK0y4MOT/e2o7N/vnVlFRJ3CIKZd0G8igRI+5XIyTGy5QiNBBHQ05ctuKwun3juug1fFKPvOV9fhC

e+1AMcHekti6/noIZ5HuKhdiXMNG9hMXCpDLUIwSPhPspM5VVcInRjpOsXk/WcIZICUTqYVYNo6Dqux9

d94jnHOOxjRfjhyBCjdZapgPZRMpxxeX2Db+Kab879
3sRwo+v7J98+2EOKh3YVSMFtbmbibpqKXrUEtFvz3n5xKDTTupX8fQlGgAkeCVc+d8uYnp1xjcn8mEl7

RsVZ3Cy5+/ZeYzRVNQX0YWGRxJt+kDqn1fLBleB5uAosLXomNUkTDMlOtvAZO+gHG5rXqpNgTOzbhUdk

ug/58F08SMwg7UyDIS3WQ6mU2hrmAWTpIgIUIl7D5s
Nr9HDlvaRMPeonIzPZzHcyb0/mnl4q2MYJN3CobFAfTKQgHrrNTCp0pvjQ86DtVALG3r73Vyvff0xmK5

rMlmJ/t72d0QbuZNObUewB4MyOqiwvidMpwzIkyja8QVb7BKxOFX5l3z8LwW5YeF2zLSBIUAiCJzWZyq

31ITIjMHsY7i2H/97MMzukNS3FICn0ZcqX1duib34q
7cdKNzJgIJW5C9T8y8XY7zQL7SDeQbnYXOtZNIyYqjnYuN9/76Mt7OuOXJO7vNn5eJ6cyrOnkjZh91+7

rpwjrWWX/Ich0hSYIs/7e0zRdU+6JxkWsGX25xJCo2g5cS/U9+1QJJU+TGb6fXgFcfveq57hvEGcJIt5

S6v+5mr1MqLFKJxZQ7Cww53AX2YTLX76x/TCrWiNSN
cwHxTrEh8lppMeSrXgbceYUu5e4fmMBqods4fg79DPqwnQWyelJvxFb5yhb8jxMEaXvrVkrkv0/YLT9Z

/Hey2RdE3ZLmVgeLidDYaHLRCykzDvF38oKSzxIHQZJ9RlXNd4T0WaGD4DxgrtEjsrTt9CbOTsZcbDt6

qJfnk8tSXwOUSHLX/Szu/6vSQ2V5Z573Og5HpTODS/
gekOtBWn85nckXxFNtuI6JvUIV8sLTFsgCff+au//sr7uC3zeZq1zTxavoAh/A4JbsygvOhzbgoaXlbi

jRvhHOEzaXR/zmW79IhUo5qGFjoxjz18Qk3ihCcfhgbncMbu63skm/Tp+93hFmMSUBnrHzRu72kzCIDZ

FFlU2AITVgiHh5Q3VorXGy/Gk3hjIpjXMJzOSjvQtP
x1puP8yXnDzj2UcQenrloy7TWdq/z3UyqWmFe5HGVeui6tMpnNXDb6KBedA8ULF/a8PpeNHKR3YJqYVl

dHzdE5pokxnuwDrWI1AAIq7AWqhNgKzU8azcZZwmFMIq/iVtOcnFsu5pt29Sz3nI2RqX4osPR252bd9G

uPYxBph6ZQK6rJHsWRPfk0WS/YNkJoneg/VDP7qrrg
t5pj6fXtdGVkVkGgBngCI4yVZPlowoxjf/J/a6KqpICS4jREqyj2Z1YxSk6y9RnmZGvP7e1ZKWomhdSG

VpkUR5OALgTpo0VgC7G7JZDZXNFrHl1ZTVMNvNl0+TO7wz+olpbNSooy6Duq1P2qaLnFqKZeLJ747/05

G6iUunynVWC2+DPYx3QdYHaEVbE7qKTcrvRdSTcLlB
cVNT/GFHuBj0ZffMVgDuv+U5EVUQBvTrHKqX4JXZAgJfAiD1kPpG48YxXILX04xJI8VQoWyCHh3e/568

bo0moWHXjZe99dGyMkzeqGJLI+USH4AqAqW1RKaSbumyek8i2csPwIUjadt43Ne2YK6XwwgSrswT95j/

8xAp5JG4icZyuzWTjr/PMhXwZfjPtzVrbR+vbGvCHc
vBjoscHME28vz9WRBPu31c7lii6F4X2qIdTgccK9wQuhFX/WG0yBL4zzCB1PFUItVt+HRR8mhluef/ar

5MT5/hxrQRycUfG8zbMTw5Yk0XXdaD8X6GMrATaF5Rup7GEX7mmJqkC+gntY0MwyF6eGzSmr2IXTZ/4W

BH6xEqiRpNBNYNmOfBZN7B36XMsvLpseFd+1X07Kf0
gTy8Alpnt3LPxOY4WPhMJaNjLFYLjalEGK0090mR3OOJxijXGnuFckw4GV5yhnwJbikMJMc1nOFGeycN

ZV21IHIq+qtti9lNogXIf/v/Isx6xuDxJ6rDTchB4NXJNh7yGSmqFTKVmcTqQknJqfG+lrfmVx65DFsO

kde58v5JFAnXctPkeLLEadH8MNFY2bHFVG35f2hnth
nDtFD2t7cJwjIYYp1Kew9AhE3U51KKeDo3T17HWHw8xnDUEQ5Wzpk9otReJjUvi13ug64v+4YJkmeXB5

Zq75+hVblXrJ4Nj8v/BG4I2L3GKdgrOgOLEuvysC6B4mdRwOuGBmvc70KSFLzlzcZR1WWBbw58zz2u06

iF5w3mRU1nBBZT0AaPxsnY2w7/tZnXPNig4kY0dxID
WcSQSpH9WQX0DpDwz5vRhW9UANJ6qVq0EjDhrWvScsukUHdQD3Gt9bqPXhbeQDeB1K9vMzS/8o9J6Djv

werofx3f2m2xU8lacJ5bevakiw+Szlls+De5qK0DMgfUNuu4ZJz8mraBTh3yDe6BKz3lJAsxRgu8QQu0

WqUTMCXkaZWvrUVmujunJ+MRLtsm4lSB8x3/R2AZ2s
dCY8GDmOg1MDgfEfDGSXsxo7mtLAaLZNzxx+ARH8CMRlh0AWDq/awJFzNfrQgmthxqqGbv46HjE8wADc

dA83/V9vPh4vZd1ZMays55hevu8qzTy+14HVQHWnmUSydnjdjPW2BbXuUg06mnVx5/I4IGq5p/9lyaXg

yrSVJ2OtM/VnOO+vqbbjY8WwF4Op8YOP/eiGQeukHa
a54tvtyVNCcoF5kVI11OW3//vlwGPIJBb/7QZbR9gK3vbu9BpoRwnP37IM24r40rLQtvVrj9yad+A60W

uZOT/XEFdvps1jQz7y/cP+wb9+IlNK7zZNYpAeSOlvewglck0CZEm/5ylONpQBlVFW1lcs4WyKkp0XGE

dQ1Fadq4BSmzUmP6YzDwoak8gZ3O023a6bWFt6jaHx
9eB0kcHsUzEALshsm2l2/2bEENPmhx2E4uyhRE3HSSsgCqH1UOKTU79gnicu/S27Ga4Nhuku80f6EqPL

1oeW6e1Jt4uzcuPpQmg2JsStW9YTUyJZixdCP8c5zF7Oi+9vJz3muNPU/0LhUqlUxh6TFDUUJkYXVbsq

AW9v3IZCF4EoXfrA6BgK4QAr03N2x42NYDbyX8QAwV
52rHG2p68QTkZo0o5fvLnXMG7Cpi29L6KToprPVXg+iobWK8cHm9xDa/77JyPSJsmi5YcH3FQda+7k/f

K96es/necyhJcQFEiT3jB01wB9efcHAz6Vba6VZSxJzv3Ny94CLTvlo07Z4aLbw+K6YiBuar2MJz4cOq

MOPlf+anxWRjyRlEi4Ra+jWGfEnSce3l1BCOGi2yQw
dNvjHGW5Bdg4Rlcn+TUmZd1fNhYzdcS1LcMLDsX5jDIjA5xflR2HZryHhnxW6Jdcfb3sptzLX4Q50m3W

zp+EDWARD/d6OMnPBf+AZkm1X53fVFirxiCnah3rd2WvauYtMJw155MH6KHOBvCDz0WwTtjEsyxBgrkkndOU

41mU+nbJsdT8MBb8p592OF3NIl7o+aHpt+vnxlRc8e
pHvIsgYnF2drW14mLrbswmgHaBVpJIXzQCxs1/hFckPxPlfHXXo+NV3R5SRTAanhF2BA+O6IMQwpkHPB

WwnyKF1fKsMVuFsZ8bmXsmh/HlfDXYUGPxNwBCu5h1LHDuqOYzZJ1PQLCUPUL5kqQpi8bL/yiPqg3NJp

w9IBbUXf88NVvF/02LmF9eJe/gJTtON85FB34MqlJF
XzaMeD/PQH6g4dlUlgVjuwBDf6vA2RD9UZjK/tv4pErvsvWtyuUUXeLjxgoe/SWOd+jkseYq+tT2xafc

af9l145u7yeEUUjE+YrcClmz/96RNO6EMEFHmSGsfy2E7JN71+BUiz7yXb57eUHYM0z3KRKrRghGwPkR

cD+QZQHB9wp7Q8cRAbaNMCYV5p/pj0LH3vkw7eHRfh
cRyJYQQiGFt87alNmNxxuSa3wsIRdcbTG7nd/G8NZMMx9hpdRhIfRcbFm9v4Ko3+k+80Lz66YAiLqRWd

MBFxY3qX4McWvGXRhqjMN3LvK1Iyr59Gwg3Ki4+IW1MSD9WoayM+S59v9ywdy8N+M7mfQ1+2VElp+bfz

hIfzJHf4HpdqOxnUZ/19O5DOBr2K07HMzojtL/sSPg
2oHdJ/fWtMxnlebO8SxlL4KxQOuLR3JIvbW7KaggChEGOhZGCQ2xQArDIHbkMxj35+HodSN66rDqx/MT

YgLEwWZuGm3JVosyslQ5nNYYyN93iksr2YNflz/N8gQrDKNt2YcHzHqiayKO+bg5AdpNtCLxcxMGBlg2

e5gGV5++XNvkt3yGBF61W2iJHFEcgsKu36IE73swzc
z/9W1W7ju4YDZvXUpN6XWZ0uz6l+Vfz3rN2iQ+OTzhvOV1dZDFzsVg5EwYJBF93kOdLd7aul6DAE3LOU

K3Q4bmnbyQWIvMPUdYSaDi9pOkbO/dY6IlQ0DEEkk3m5rW2s1S+MUKeTCb5G+6vK1t2TTE+CfASWVxS8

F4yE2Jj/yPXVGhqJMLkxtiDvLYKWEVvuM+Gaaz3R11
3oJjOBCjNKsWQfowoTDt1MPWnMzoWWKFwPbzZxQzTCC0fkkaKCjvP92gvcMaQanrU6RUDdNKIW96uhbQ

djuzz35e1oFvKSVslMwCWFg2KTexsMvx3C06v0S7PMpIR4dSbgcIJPl4HczQV/FAZqCDhWUZjCb9zlTR

+D7NWBqhj0iAuRAVfkQUul8EE9bEtC296tUS1c85To
MiniRS6fa0mJ552M+N+NvtNClV1hjCW9+bfJY1IPQHWfZHf3CTbX2KUZhW24Wg8iqAXa1rZXRYOqjgbZ

JX3D21XK5f09vdatYQoPT73qSaGiBzjg6xS54I8uvfy+3gUlc9hrKv46Uu090E/7XyRYA0SHHJtlAnyd

AHKn/ovvS2wIw7fB8MPdAH0dn+siiAEgoEPC4nxwr2
6BHu1agwNyjPVn/FTv/jH6j0or3do0i820VctEue563iTP8YDc0pPPsjqvFC86GbImZpenspJQrDIfHk

Ki3xVHozwC/dpqhDeXRqYIYzT0T9YLC2CbjYWN7nEgf3QLG14sCU/oxQE8BvMpZElS0s1t7px+T7JkHk

Ga3ikAEAxLULMREXjCwMrYWln8Lf8BvkOfo/6fvW/+
rOpX0JsvUAjdrrVEevTGLTwhDjMPb4cIyDFeO2h7v1ETWC3nL7oonzt0ZFpvKuwd6cg48Mth0kU1uPJ3

hKwg6tZ5SL4VjKrfRp9iYGYFdVjB2C1pyQ/NRX9Wqyzbt4LQPLeIeY4XTHxUKFESqdQcA8+MnruwlCLb

QJxyLtpMpgtLHwrwPdM9zNgvc2tl9PlGwaioMWGtdS
mS7KZ/+k8sZP2xcbgPxqkd2WE9P8vcKicnDK7iMOWuobQWpj5Yk6dsmUJMw/E9GkQCHa17uhv5EQ1CZt

qGh/txUwEsMQ7tzoCtQP9+IHDFlGpQglxRgZ0UZgxneMRCK552O/5YdEMA3WRIEt2xlt2FLL7XI7S2xI

hx3NaYT1JWnu5OKvGBJVV8ZMHlkKZF62zsavk8hbyx
w1yr066Ai4ALG8r9lz2aCG8bFieBQJoPup/usOhWJHVQeeow4mk3c0zxgWtU9QRI382igkZRTc2cKoYf

PpgvFmwyUKc471HozZBRaLHlJP8ra8TVXh1qbJdFYKdPUuXRbiHR4xC1JNRMl4i+gZlZCqOP26HsqhlX

de2qhenYU7ByBygDpp37bRUm6jy8rYPuvmwdsMoYkr
1X9kek3W6BYDR6bduAfFUVQMl8r+WjX+gmt8YNlO+Olbo9FGblJ+5PXDCGGaRaRWsTevpXd2aW5kpyMI

QvnRHb6GXEb/24SnigZkT5tRa+E6Gk/5O0bDUUa7jGAkJi3q2V2dsBVPd4wk7ckAoZymrE1B8h7fynx9

O0/l4oohm6BE/RtDXIjQM5t97KL6qrYpX7su3g4i5O
+2NO0lPmvHjdTL0UXYTrc7rGgKh0TGjNS2XMBLdEghIEXIHkN4K2c/hWwnmAWT/qVdjnX6ssGD41p/ZO

bl2rpnMBCuOgfEa21lNdGO2dI7HGqOgMrShw8es3tlebh2eHEqUgCmcD++Em7y7QFDfnGVmy4W2oxan9

2OOztiSBwsL5Q+MvUWZXmJDXrGY8rr3GBCcQjpaqf/
Fuhd9jZLEFizbWGfNv8CuQBM3LwII3jpNFgeJ/eKQV+Qou9H6HPPPb6Mn2Ht01F0W1Ixk6NlpMPWKI9C

5gIk+92nWa+RDVBEEMU7XRnXZDYXronrCrY2lWcGRaDqO0B1F0bJWdO/JpJ8mZkfGiLJh/7JJf9lB7aF

v3UsB+/BPbypC7sO/44SlOzEonNpvdfDCQGuyysmCy
jTFktYRYwVkhx6jNmL4nzJwkHhUN6TJebTHYQaxwZJEkuNlhJbpNhRZ5IkbPbsys8w27toRB974RBDTa

m9sLKv88X996JjZvHtTaLpOgYFpsKKZWUi3eEKAgHk89qTCjOIXv2DtAIC6hv+N7LGip/uEpr6iz2O+A

btY1kYLYeZhdYnIMmqI5EL7xMoe4aeWyTXdIMHm6Jj
W73rrHEC1Wur3A0buznrLdTuTtoWOxzd2MLaT5SKaDw9bBDAdRBy4Pa9LnruK4RDN11TruC0KYFojiAN

kOMcBP3nUhWrIDtWj4qdPyZerK1qrR6aNUmzRJ/dYV0aIhmbS6Tih74am7dT87aRzlDj7+z/rEzEwML2

knil771GtS/GXZsiKwJBiQLtIKH+j6jzCbC45QdP4+
nncU38XdSYWcA/+eXXybuPornGgLHs6+23ipu2osBFP02k48l1TFX3VpLVeW0ASMXiTjt9uiEpQs/rCi

6Aat7Qqcn+3GDGXbe2e+bg5hxnMcCtTbDvHN4iqyatpg+I7Uz2yFw7hGwFxPbaH7JYwTTdMvbR+gl2hi

EmfrgiYwKxBthqGVjqtY5zrZVFl0vPww7MbnJK5n+S
4AKxvLwF6ErzS38X2jrgw2C5Z4cc4ze4inOhNENFF7+wenp+pKRP4RZPp67kKXMGyu8Wek/eN7mw0jnO

8w1X+Berkley/jtd3CWrO+QmL0DxFcD3nh32rdLW0beBUAwJ+dm1p6TQliLpC0B6ajX7JdHGBsqvojtV7Zgq

79+2cy0U6741SlsZnw9CVwHV8jXdrMG0+llvyhoWWO
s0I9+8o5uL2jSK6xbP1QWivb2AOFU7LO+1RQopYm+sF5vueSNHqdRYc2vOERTmal0C24vzsjZbnp8FQC

2mBOjGLWViqcWT6JFko+SCOP9Kha00nJSOvT1KIFAq+gAlXT2qSZcymrbAqDbSIUB73u90RcX1iBpihy

FSa9khRztQeFJ31HgG+yyZTlmHaz4KPcB4c258b7J4
PTKdstkUPskiJf6sZsh/g+bUIPk706cM/MPRb33PTYnkTYMtnDu8rFVfxoARPHWg77omEYkPCrD4mJdb

yPP4IVpTM/I0XJGl73zpXf2PNWT7dKXnbDynkhpHOf8Q34FwVMFWbGC0T+TqfhcuGVaZ5YBeWkGTAI4+

JTkwtqvGEfXJrVeuVC/L+Cccz0U877gtFRaNYjhxCA
IJJz5JG4qnsdG3n3JmQMW94zbrAIOKXavuiXS85pXih4kxl17Cjjc1MzNOuYj+3Rs12V52kCD26/Wh8d

OodN+tPq6Ddf5L7vvOdY5y1rhARUWtb1l5Lz0Ezh8FPHYC963wX1aQpNil5GHR/o5Ia/AvQCFk2qCRoH

KUjCicYgzS9tzpiYZT9HeuNZtD/adEHnsYgx/gTCRh
jOm6HOzmjmq259f0cVXk/TrouZHXFe9OEoKkOycDK/eMVzdAfkW0An/BO2m8auDJF329hNLL+WcT8pSr

LL+UyrMaR6+px+zDCH8CWzIjbodtuPUmUkRihc7aaSz6h9b6EY6ODeihEevG3h56K/KYZTRpW6QoPNIf

FpbjbEnYdZSFdLlwyBugntR0OWV2yrpmyN1owXoCmv
mpx92Mf95S495iYtwRL4flNfbl++f5uRHjTbIbtYZrMZciQ3D53EhZK+Wp4MMREj7kWaX3cTjZ0RIG68

+t2H1I1GZw9PK3FQRnf06jW14QbVovbo3sz1gDi7FmC1vR7383dygIVYDfzQuLUP636TkwcKW6+ppY0i

RxiTPQAu6lIhrHF+BthdItZSJltGNr3EKEVOn+C6TV
lCh0w2SCR98M/ziFH2zjKN3KcKG/ymIXAhm2gtzsKQmBPm3Cjf+gPc8hT5pqPFIrlpJLQlsV0KMBKLIh

+DWzG6bxqjiik0YEnFICt1FYcdeUpAdr/+06GPGMozqnz0Qvle0WU+diD1sNIBZ/i63Pd4q3IYTjLKS0

5J3MkMG5aXmHH341xTh16UgFSL6tr6PMB+eX602CDu
JeyTBMnSnLZRSTM1UTp+//aMN6efu9cUnq6x1Ogtb55Y0wlvfFeXUFoQBTYVytGf+QIuIuPV0WfkT49o

l2iwoOvdWeWp+XsOp5QuptGkTR00gjeM/JZCVxoZ29wmPq7dsA8BZ59YCd+PjkZK6lBH5OUgvBaHM185

4EyOei2YIJaex7Fn2UqB1QAeYf38Y1/jB0gybLf9q9
V8w8JC1WGgacTEM0eBLt3ELrJ6j9e1wq+oijxwDOAilA2U/BftqV4rpb5/WPGsH6qOkqHqYpqrQ/k560

0JVwEEm/VS4ccwTdnDhplXrbdmSqflCcNhfStAANWjND2Sni7vmN/PW9UHl3Ajodz1tJ/wbTOkCvN0zb

gM8GnP/8/pIt20KZdxGqxn25AsU/U2pvTRJ4eOg90T
TKYjG509NiaA10WKLVuLqM+QqDuZsoqE+tQh23hFp2NyvdEYSWJj+ZOdwwQngpKM9+fId14onbjX3C7O

8AiBFa/YCS/09BTg8wY2pcHR94WNidaWyIK/mvuJz8Ign9YGKYV5chP9LtjUq2QFa/q9+cq4MNbUq7Bs

6hYWgImmRoq1tz4KQlr2tkt6nEimtYRWRrbbnzv34c
mPt35otYDAYzOY9URkAPChy/d0mzZZNuvlM9MCT6Vi3MFVdX+dkhwR5NHR6pVOM1igljWnp9v8DfAlcD

mOeQ5bPtwZv887q7gPZ7+eoWbpwVffhIoxFQ7kQvqhaPpMhnePis9V1USRihUP07P053b8AzGsnNsC+7

un1pMos9+a8RWBznOlqy6joRCrh0VRWQXrYzj49Thl
bSJaBW+0SffYI20lSI/lq/O5iZpRHQmMcujr5aZRhkwfeiSMWaQoBTPYiho2OCksICqEsVMHe4bEa7QX

Rdp6jYZAlVAWnQBogw+5yxQh6vri1CLoNMJvXF0ldVx97FGpq7fvkq7z2XqjFDBrTtlfX174gHhEXh5b

uEPJlAveTmfd4UhfsT/0JGPPshQcyX5z7psVZXzl0A
WLD3KlEcvxMf7pCdLnKr5RhiM5l/P3ydHeUMnMDThpTMXJsI0j9ZyWfCHwm/kYHjhRMFHRT/y86tkP2Q

G8IXXPogZlLorXQ8qqrfGKigBOrZ4VOnINoQ5UWqTqHfp2A8cEP221ByK4AkuplzHX+iatXrMh4YsNNO

8Gvx4IV3py+sozlqG/dm8k/QeN6Q1l0oauwZcQw+y3
uaqSmtya4/PFJONSOQX0swOKN5O0QUWeEwJHO6PEhq46jkOpG3bXKVVgvaBW6tW+taPIFr4LjUC2OVWm

tk4tgnai61O0t3FfYsTlNtSXp5qDa8DoSPncgXyKzVadUyJjWX4aGhvKDmVcoE9eV+dtH23oJnL7+8ot

Jea8hfpD6M9N73HBZ03RejnJQFXZ32JKjZ/adNsLjN
64vxqh+tCnp3sb2yKE6hECCQJyKMVOiNT9lffU8URhDUmutQOgoyVm8Ju6PSmkNWfU4I5Pp0w24hohrL

dKLFOKA8OfscJs4QCC9S56Bfu1uHxi2ZCRhrT2NoCQ0msoj13uMZf/tXu2HjS1csVt5RJk1T7rwAg2ir

6qhXXD96eUdwJe6v6aKWVzc7k+jdHX//beunv8yxgS
y+FSqtMylFNhzKpQgbojfpFYyoTI7eeWMnQ+CB8r/5Qdy6FUKfNvpaVi+USCyGALmUd4AX/MrPtze13I

IE5Aqe8aQ52B98Y8Z+yroBAub3G1BczsEX04hMbsJiSgbNQhhK4f6FZHk5qkNDfAXk8eluptf0ZOp+N9

3YsYkzEW/pb3a4373DrYZA5Zp9v9L55EHDVPZxAdb0
9gcaOfm0Fmvhm28U15Oa5VMGW/YeNX/NBVlDSidvfCu76R/Zi+dn6eqFPQ9DQNDzTGf2Xe6r3PO75OKd

hs8VeR+sOfcbX7TYn2geX82aJwz1N0d3JtF65nlRwveR5tZvDJ/RnpEQyI8OyDx6TOpdQldd9OhPaNHG

fPfNGqjBb4h5oj6byN4nkUwmlfr/vPIEkRomzxtvlD
m+ZCtBUrrmFdvcwPlzMFSTiDbSnxZJt0zDHqu32PVl5TIwEqsnuRYmhN29Uo33EuEhOuJeSe2b3kZQv1

qUq3YlNpIhKIHA0BPjVhvMjGh+IpL5ElDh14c0/Z/SYtILg7bEDspIs9uJwqkRYA02luikrH3q7MZhN0

beOp5oU0A6FMZnIdd1s1onJ0gX8UwdVmoCbIAy85q7
cLPuJWit/GFpr3fh9MYo0CFRbQiy+DR9pNbBQwHaePTYObF8gbFnWyeEp93WAKxHpYLjFyuMIKkMnZ+N

JMYou+iqhp557KpmaeL6/0lgPc8kPD79TRui1nxL4OYRVzypInKCEr3J2iv2tJSzV9XFbPreG1gVZAn2

uoasKIJvvT/tk0wMJ09xDERfRHh8vs/tdz1NQiZApb
zESJtWi23X0w0gQFRBr6EC8goCCoT2zw7Ev8yC4L57Ck837dK7X+3iAUCkqa6+PjEMep38mkSlCljUkj

26cZWZYEPz9RJxiEasxplk3ZkBOIe0GEiKDlpEg2z7FfrwLQpKfCBFGk5VscHhkriWXpVfiLNjF2DVgb

aebjABni6hH1I9gCu6wna5MNoEC+3GwRSawVVDfwt8
77lUjaJhtCR1fmR8Bm2wbBs1/31VmIaz3ICfd7DCO43nSkLX5ABG2ncnhDqokA2mQudTZqAcGY9I6ZWt

QOhyVeRcT84V/WPz1H7NtMdsUyy0jLrCTtz4NWys9IklVbj4VLETYNb9LF6Rj/IQyhr/TXPv079sTVpZ

4ke/MUsaIaorXqU+Eh7e8p69p7vU88XZaHvtvdzB6V
BGjd/zMeA8OTHm03ylj6FQlLwunkKUBNZ2eDBpX8FMP9owBNmNx+F43I6jBwYMYP/f2QwBfztubEPXJH

jcBRJSsRfZTP4kT1Y8CCU++OTrdKKBVvrowafrUpJOjBGGIDimZl7BAiHdEWj261gqziyV/Lf5anXTC7

N7ApFvfmfW0fdu59jMR3+G+QaxW+phYHxxwSi39Mho
9zATBQOzFmG0Rnte189w0Ia89zIp3pe+EosHjyQ8wrmTvAYb2pCkFh7eP5PaIeo7zP2rcO4mwnBnFQIK

FNL+oSvS/Ni7sjrXLo2MQE25iknbZw6D2OrmsWLjkrG69z5a91dd4wYnCnvsExEvPab7cgfH34box1pX

7+pbgDEQfOBzMFuHSnlv1/sZbzXZ5/KznaVtFYsAmy
3yz9t07IeSvq7/iGyMAG4c7uEj3PXfS39JYzbZeqA2Ze1nNZEDROlEwbyj59fpEKDiPglsPnx8htvTGn

zMWMWYET3RT61XML0TwSMfGxTK5ZIUAbiZnH3M36+0fVN4BmF9Sbr+ahcc0FmPJ2WXr59HeYTTVxxfyX

dfJfu4TLCm+bxCyVw3I8wBPMOuF6YrCTkshl/ob6HN
YnI0va0w20ztnUnT2CXS/xdfDqRwfrmw+MhnA50/2ZE17RQu1AbElXETo8B3owkIWVDTt67m0bzcCgzO

iljnEc1o2hxkCoys/BOTI8h7xazqrNljuBVgoPPhgR4dNxsdgHPuT34zkQCck+QudzOfqubvf8MC+X7L

c3FfkDDw4Hs66kdVeR+uO4KK24YgpIUYD9ctZmJbBb
dfrEmkwN/xjAjf4sBArFeBmf90lp5H4noYTfzDi1uuNuI9ane2xp/2Yh0a4A0yI1Etwu+N+72G71c3cj

y+nK5VX86wY3S4adQef5TutOZuC++1BllwVOe3NBBylQLZnTIm0tpVr6nTn86VRkCC+LqDh/npB2+9gr

FBrbHEK3xgm3MYOuWqWqJc5s/xc+GfVW3765QN1Woa
9uJzHoV2euh12W7dxhoxqnG20jXzBTUCXY3PDAcYYsV+4KMLtepJge3zhNXClf86YDtBsDEl2x2npNUs

r5XHh2wPIeTFye0X/wsQyzfAkoEt6VlH5mQrN/zh4eJ84djRLHnuAweuMy1za2ms7bPsAmHx+QuLPPrk

N8cyeIaZQa9uA+n5acieCTV+pwU48Jbx0U4Qt34+h1
PDWysxS03N89+Vay44Cxq60O7dXaCqPRhw0ehdhgZi+mOTws4NVfx6U08eJoKJ3njhh+frQffnpeTAzZ

ySeB9q8aZ1BXCL4Tsh+g/YP4CuWBwxxZMIVewFjr+0z+28ivJWC2efTKaVmv3ZZAaC8t7uERjULdIHXp

Py27XSUWaC7tXhi4BWRE4fAgbwP0DT+23iCeJMoO+6
n54RcEJzRgvfx+NIoHbEla+jVoAVTMAq29fYRCYGjHOScjv9oIK8+P4Imq8NqmlV0gfhetRZiWTOH4Co

BnzLwjEPT9A3dwo4NaK/2AwTUlwtycEgm6/t2iCjccc+ye2bpl4L5KPszpQs7+JeullaJrgxvl153USw

UZ2WobTZfsLIKH9kWvR9RhOAXzYh28d3+MjfhT4pp7
czQ4IqhopbAf0bhccmN3t/qddVLluQu/PreGJNd3gqStccXCRsjJ9SKLI6czr5O8kbqSqY0VoJchtYJd

egI/IpfLDV/1qPq9LuzKWb14XmcW4DAnfFGuGW12GegQCQWuGIUw23aMX09J1y+mcfnfLkn9LzvIw1uM

qk69Nd7P9Xw6DJgZwTGllDLL014rcjNXqS/kI0Riqu
MbToBjGyTqjJmMiSLF9iIbS3Ux37BxGiL0P4j2cEzSMYNlVgUSA4IKc0OuAXBurlfP3ppe9Q4dXCWdF1

ldBTG6J2yPRMDtf1LKKynBPpiyw0vokJE/rCdO4gML5Oymel8XkIYDf8+u8lD7C7pK0cPDoMnmTEjvAO

E9HxFApqybwu9uEUsENINviUQNQC+r98H0dJyjFftA
k2Fpsk9cYgtcb8LPNgmume+s1bgIfivpROMTZNkY8awmUCYWixswx5enHmV/Y8c4C8oBn/qw7rdVskMU

CR8ayxq9ssSB1E0akz1D8QtsGiBH1Km7INjXpN0xipE9bxNW85pggzgXIyubD4H95n9BVqsA3j5sqyr5

OloNWzOylM1WkoGA1qbE/dqQca9jFL8zZw1fHeohSR
pVR8FumqGPyrMWwG54Jsq+2HnSOSEP0ZMCK3CewHEy1YwZHWI4nY2SWdlp4kQzXvyP/rJTT4Ckt2oos8

Fm8ZRabQm+R1fQkj6giklN3JK71Y5reO3zbVPx9T45IT+HM30TdYsp0Irr1A5mnPkz90xslUJ++YnnWb

Ep2/qZ/tH1mNnyaMR1OVD2vsXfcZ2rgsLU3DU98eMQ
FgOBHDlLTBAvsXsSHrDwDC/mbJSj7uPldeA+avolPdNgoebyxqZ9wZRvXR9cHqUIy+y44ccxZYgcTt/1

Jvh1ZSqcKuaPTXwPOsDkpKRucW3x0WGyt614Gl/ifZ2RGo0cK7BghoVlFxp50BLHZm+KRsr3V4kmTWKo

fF7pQCgmsXy16TXYKSk+PYLxGWrWlP/dvt7FHPjL19
AVybpU5IUQFzuALqXg+bCnsvwR6TdkFy+AFsV1y23u578zEn+6sVbxdJaWij4toJLU2in+Vp4KXDGpW7

IPa1KUNJ/QnGHx/omPCDMfpE3yUb9vpbDn/uSgvu36WLMgHfuHIs3bzTOZNHwErLNcq5slRdeSvSICTC

IkkPb2SbbXvNIchx8+uCSh0nh0mJIVrrZ9S0N1ITNX
N5bz+vGTlnDokho5VskoiKgkYE6cYBzTT/bMSx+MZzQ3Gpzff5is69JlOjL+bN2JoQ32z2OfdBa2zY+z

u1XX8zvl5UuoYDyBjOKJ4qSuIpzBj8G13XS+9N0hbTMzTXaS0Mryj1fbnydl3e6fJYKQmImKPnoPe0mz

T+UxK0EFXFgx67bByHVQHI71aRtPMFBzis4P2g1Ct1
AFDRbSbf+J8ZTgr5jOf5Ro0MIhLUb5+geY4FG3T4w1haG6cR25uEHZdhXj/orAVzqy1mWtu2pvp5/fJr

jpndEoRKksXwNRoybFo9d/KlzTmWRuLLwigdySn+/mG7UE0JalRnRQAXWzr4l/eozFyVoP+/2Qd9cv0g

pNTVdDB+VSbJTEmYgrt07j9aKTGzPSJsv3c9/sECS6
g3PKDpCQ72Q2FWvhqppaLzlxAImp29c+AoyIlKZny3LTD2TqBQn1+wxfR6u87y8RHdE1PlPureyOX5Tu

cBTm6lbWnDyTCz1notVEyUGmxU5F/t2vOubDHjnCCB7eTdHIuZR46AsL4qC4nR8xl7dAMGLFLDFIE/zX

i5VjZIlts9d2qE9sEi+7euL9ujwDWrWm86Fds6kZ7z
nWQ8j0WPR7kxWkIx9grGbER+Z1gzJ4f/h62+nr0NgFJub0b3FIf4VfK0H1LPM3LA2dDQLpCkLD8GxLem

P9R1Beo9ceF7zjKzRs6mX4WLizuUoZf/CqF/MOjU7Y3qRalB8vw5FUTaPxSoOSEjYaGKniD/KSEF8zoA

62nm093NMZat/79P3vSjHIF9p7XYkO+ot7BJdfp+i+
H7itBWt00B81+tRQb/9+e3+xCdFy4aC8q1u+ld9iDvdntIpgT2jUOwSPsuil6LyZiQU5NuWLlTMBW4JB

gg/DlzmF8MBqI6ol5B+VnIhyb38IBDg0dYW74lUticqq1kyL5UaBmeTD0xYQCIjlLZutgb/2N+ESTNfK

no34syC6tYNInf6rFpu3xPDxB1X7OT9glaneNp+wxM
/MmPBAOA3yslDoY7N1jEOK/+Hl9KTEsHIfEpR9O+If86TAm6K08GO+zbqGD6esFgK4xbfO9ydlplaVn9

yrfRgC9CQ1N+W3N8ORla5vdC2Xs6wZSC4vTGxIbfHEEZbH06byRFw3ot0GNjuPhFL738mvVYxy+aUN3y

XtYbI6HKJ8mR85uO9HAh1hNhpjs/qkGrdtO9M1rTa7
p2kFj9OknombQO5Zj0o69WhGxjd2sNsIapu7MbAym5jipVgVUZhHLOBt/AKHB7Xqv2yoT4P8ATRn27Wo

rtzIbLaxydADzOv0ciV+i48KYzMjS094nTdIvD4OD1+u0SNzW5mgitUZyqa2hd6MFY78BlrqAjdxyiAK

Yfm+a3K+9XwvTkfaMpp3Xu4loNcGZrt1cCM0rz/5i0
g9yJzZ/K/EKcjJ97tGfqiTVc9xC7DsHzRi7d8pBHm07UcloTEhIlOr8LtQAcUuWVSVGcPuLM/WyDfVmn

Y7h0FOKkTmOmJWsiGziJVLDNQ5HPsPOiTXimT0+niUY4rA/OvEMq1wzBmeWOBHjGA8ZfG93bCoudWIw7

78kbmTZk9sT6byUo1RcjN+Wi51z1vlLQ/uX3ZifWHr
9B+HJL8NFXTnQyhUPHhGyAF97HY0LWE+6f2nbzF3qnsF9a+6vx63wQEdxRL5YmlWND49MWBGw8zPekb1

hwGVs6fUrc4I4eASQaseLUhJ6bACz4ZygKwrYL3omYf2cMuTjyCiKhiPdDNlcl6z2KIoFkV/q0MSFgQg

ViT/ksS4xwI5MbU302/+tZMQsscHP/PBciBf6vgeBa
nR9N37XHFLQQimUzIrDmOa5J+Xv3ymC3VBz/NWNLc2mBiZJq4nMAgPsE4nkIhQCwubbc1kEmYclSFOKF

PdqXZAYtBJQUlrrrSuVJgbVfu42+Cz5y3sh5hupceSDvCvqc0zkomXNjJrIn+59O7bu04nx1zdJj58Wg

ooado0F8Xu1T8PnOOr9XCUn5GK/fu+wO4d+lX28+8N
OFpJSTfPCvUt8sw+dum6DvJCxOsuGxu5k1sJzldLfIalkCcuxUTOh2sJNEvuP+Qd/1Rm8VobD9LJ+L9v

DBusN0268hw+By7TOo04et9XL5CuygW4qu3fbJmaElN6uyRy4vvnkZ7ra3oqDrjYhiEJFi7iutCgOdSz

JW0nJNyZWvAJOfYvr81gwRAfVbu8V8yVhPBsU7r5za
weQ2sMv1AhzLQV+Xaf9uCoyClu0LrnMrPFvsjWd7AcrU14WO7gEVOVV2ORxyMWFpofOQYK7mjpWxLSaY

3R3zxeeM8ZtMCiDvRmBYXJB5ODwbbplQR7xaYh3uVWKx7gCs8r2ilPQ/f9H0GKxByLq8R/fhU1jZVoG+

/QQf0cxP5No/epH1QOYLgMdYVU2y2blceNLHlLbHLU
f6IE/BqLgO5Q2/tZ9hpc/k0eyZ95brBuKzrre41nc2cRU0HFyY1El3u9yrwkGgR3mnD1CpZzcjH7XZvf

cnemzCKKf8xGu8RFRHQ5LHocg6bD/UhbiHJc7BowEQD4ItIBBK2lbVX/p/AgVS1b11zaZ60BvBcyNV4T

olb998wMi3piUVu/OlmzfTq2zp5tRIhV/FGvGtQdVq
lcfEY4xhszUgm2rIAM6+5+UbFdHWk3uPNDQ7AXN2BIrwC7I4HTjyuq4UqLd0ZbHm5HUi7+ZDB8UoxoaY

bvkmkdo/5PgzDDrLfU3fVKlAyBMWiJlQWvZRVrlL3ceJ7uSzh25h7EyvLNlWpT6RkN9oHG57bHmBv8B5

itGjPgP+rz5P/q8+G/Gq+dD3hUQxdR8kIXYd6vKPqR
ZtbQFWHt6YdmUYHqGUb6stY8qanLnaVXo50A7tA2fyPpoFf+/yDmHNDWBB/LeStD9p2V8P8WJsHUqtQK

AnzEmvAJ2VeaptciqXR5+nTZX+HTw+4s/VABPJq9iES+/GT8H6EedoZaoZDcpld/NCWBq5sFLGSmJs8/

u/BUP0yPCtL7GRr+9lzkOu0WZutz7kMpp6aqIkwBq/
DMqSi9OEH458zkKD4tOIPj4v3GAP3r5HNugU51bqZyy0pRb7RdGLBMuvMz5mvqDkEmqz0p4A/VFs55/n

/96CAs3APWHaptEwR97To91Au/JdXBJUs+sgV5Z34jPlbIpWIDgyDxVwhQ4peMO33zFbkH788W+l8Bmi

1BXtdciRT5foe5iN6AUvitzyZIZQNdNWcIILLkPAWM
nZ1slrHQ50XyBTucbtrbEmpWUDcRg8lmfj42E/3ZVK3w+mEl6M86ALrAlsjEpbWXS4Y5OmE5HOFka6st

NrVVnHIyntPeLKFk7vVnHjJMUnCysKeJOrTAHhwBJmk5RESQulbB0cl69Ephcexj5AN//tjiT+haFUtt

/eCHjg8dx7DKK2uguaVPa/955hTqqeP+umA8jftRiB
smcGFwPaETYY7H+Fr5kXnpmI9Um0H2fNlQC693IOWjWn7JWU//94+ae7BXvcukILd2qk3e/E8zEP+Ccz

DkmYiMXCT+8o7sYhJt0z6U0fY4CUS4ekvQtCqQ9QH7p7nWbSBcVoGcsf4rIthxqwFULgjPJaUlxAV1d+

346unLr17H7zmzTtuLLw2n5lnq9pr0fG4d7teL3aox
z88N+QmvYG1ZFPqLoci1NAivuinplPh4Le3AuwzZZmxmJ/+FEh2qcZyXPVnlIAzL2kR7gUqXHaLZJl2z

LL5enW65ayis7Whu4yS2nq33x1ynVcbEig8ctXrVogcwNa2VkOgTEZ3jUVl5lS3Jd8nVhUeUUxJnu5Bk

B/yJI2Fbe57kG0BBUfXeW+PfL+5KhZ4+zcWk6sO9aP
Qdt2EbNmJjH0MCYxeAwudPoH4eF2kQaylNeTTA7Qc4XOLYFwb9Vz43QQ5xwK2GaKKZH0eOr4kzRMM3Dz

LNSqoZnbPd/q56hd9y/knmrzDEjcBH2/YiOLyb1YTgOabndGOmv2KJGs5hsAY2KSYO/kRceXe+FZgYD5

SGqfmiXsxVzJGMPvTtt7pjgdou15Oe9NJOAmSKguHm
Fhbt6xJ4z7MYWtIHRHmUkwoH1t7kfpkARpAo4CPusui1HPZDKUtGgVa/EJTo29yzVdz6fQ6AKsVEOblm

CykfNjhTFuALEhpF5sJxtkLfj86tO8Vr9rj8L7KkCzc33N5vbvzKpZzUiGgDoZ3PtvA/vu1WKgNtYJo7

xzlq79JkwO4vvCi5CDwxGX0L0fgvL5ATH6uw3W/Zuz
YohR7XkBkeLvhDS2YfZ9D66iuUjCwIvpZ2LlZ3uGbL0qT04tmg1sUJJLIQrtr4Fs+8OY5Hd/3RUDpI1G

ZdQ9vXkC116Ai1UhWeC1yo/Tq3+b1T/CSfzr9rLik6aEhNSwcszD4Zs3euw+H6ew7cGf7Lbv7L5AbZKN

EQxrxr6baVt9VLNKGHMklT5NQEm8qG1FyBcoZH9Uk8
NDTb4dT1fDaq7dzfPXGU9lKyEuzPe2HsW5p1vemvo/Z0rXaeqWGNGjTWWqBtlj28hC4tP5xbN4ubW6kl

EK6cKehRLWo254ZxbjuCEuS+h26NYorXTm3LJf6TEAFVz7nSYN9y8lf/1UosaY7XVGnyD41MEAMUroZm

joNOiMT4NKQlnMQD34yLD75VXYlJxz60Bh15mJwRqD
L/WH9M0i0rVgfe54PBed5PfVj65yRU7NrYCtUoDb7tgpcvKgW6EbPLjQjBSkv/VZ6T3wCo9rUJ7FuQie

ib4uK5PvrxzBTpvzV4S3lc92fDE4gkLBNWdCuSLMAO1Er+19mjKWxLLvpnWzqVS9fIcj2ENFMFhmRQ30

oDLoIzE39T0nc0M0x18Los0gPqtqt/V6Js9dQxN3tm
871LshhaGnMdVWrBZ9yAqidP6Sm8zqJrhGIlz3gYDHC4xOKq6r0NglZoiwXcVQbaNSNLfhm6Q9brTEYc

tHkOyVmY0ddJBoS31K7pZOGEOdiNfWHK5BsU1q4UkEjPcqMCgGvq0Zvv971YLsmrkj9fJFl7V2CxWPnW

6DCa1iM4RLX7YmsDFHvDZSaJ+Z571kXqYv095Sw15N
FhE04fJcKGl11nlkHbklLBUXrNuVLVBbwO5KrllSkelIDNh524C/w2mYD0KqYG+6Ta6GfS6Mbt/9Anzy

MbXaidHyCal4Ya0tRvh+ZQMR3WRp8a7vwBO6PfR6bBZ9p0JW2Ar3UH8recAPYeWIjtmE3DJH+XQgCz6n

mSC7shwT9yqmK4/dGks+mCh+kNtWppI+0Ow9P5aU8n
HPdCf3tP5fa+UW78i4iOGFNKAs3Q7Q9nCcTM5Ggs1Ek8G8m6XqXFJlOXLj55J1k/HoZd6xz2GWRll28o

yxUf43K4XcLsEU/3/2T09gE85evERFMw/LdANlSv0hN2AyQz9SiHtiRInAXS3n253dhcKiAkJyCDdYS1

DeI9IC4/JKVstfby1FSV4iMAN7U5ui3KexQZAxU5pV
jWi79sCAqGw+Bw9VqVFwq6E3JT379LQOOStzQYSdc/i63D7/6X4Gr9j4u06jFf72+1GMIzfFw231RyhV

WHxPf3XmLsMHaCymPDY43uARk6zjy1Bf3IFM8zt6epqwj6XO+f7foSMgoFFx5br+M2qb/R+O5lZezBZC

orae9RifTAMyQttjQN+TAbE4BR+ah86RPwRSUQqsTQ
eXiJqW24yo3F9+5SRL8RDo3d6stSFLiR7EdOOnSXpFxrhFTtizdNILeif+uhhfc6wnD2qmZRPl+zfcYc

j6/CauWJkvJ4nKRXUOiqLcIfFzSew5E1jbFu1wd0xKDX8Ne2FSehpx9DEmmlYvV1VLpHM+jy6jQunCv+

6jgg20KWuIBJhAo6WDF7IzaQ8+KosfSxDofYJG+qDH
5UP9k7p5YtdxT8VMGkuwDOUcHQf/7QSAb8yGHoZzPKT105EHgpj20xPQeUCsIP6Cn41UhsRg0wt77loV

xUv/9RuifxlxLSbhxOIeSRjbjap97L9m3xDQ5fueIaycVS41JPtRKMNbqHY8bgrhbkYs+2KCGaMD3U1F

oEe+Z/KvOgw6Y4/9G/ROLP+RgbpJvIra/As5OnOpxN
5/wDiIyWc0ip2h9BnuuSEmjz7EJRN3UaFCALf1hljKavAuojWOfsfIPDbasN/7JQ5ZiyUxpFwUDh0vG/

7L1SAJmdT/7b9zqG2/Mt0PsapYTCm2eIkstaFVczywmWL28cJ/lXIAPSEm+tg8+lor0rHG13gJZJfmi+

wcLbefZfKgvVAAVMzs82+JkCnyyEoJKezlNwj+nDwF
NIAV6LHL2OSeC+z0KCpUfvVVN3ICoT69RQdh7HtNsNYH2SCaRaOWEa5gP9cI2DQvJTd90eqWsdfbG8AC

mO51qsuy1KSezfPiVG0uSLjhTFZRyHiaC8D1hnUtV349HLVQk8arQNkEOZ24pec+XDEi7eFmpY2F7a7q

TqgwYmbNX38LlMmkrRYu35AMc20NXOQC2VveejV/br
M+TKFBKx5K32W8z1O3JR+b/QTbVXsHlGj9KOXjUDNnejWFLKy4VE0UXb9zN8IFrcHrbVezmlwS0eur2a

wBOdyk1guAG1s3C/rBMcbAFMN2eWxrJckhrBNgCWN0LVtE/4fS3rKvmeYJERkqgmS8JyOvVvYgtkJ81J

B/iQq3ovyz5Juj7oKuTgyBukRbx/aXyrL4CZhaqYe/
zj+6Q4iMNPY+QBxqsn6aUj4wkuAoiJUTJKsNOtloZxrCV/3AoWdgpWl195Sf294iwSiLkkpL+U9KqdI3

o07BSO4nJt1eohMThcj9aUocL4TlgI4awy+m6ez8e3glSiGMH6iI53SeRBYIjvJeGeeNSd82qc33zzYM

BH/el41kXR9g+A+JqTuv4EHppbESLXtTBb08HDoVjb
wdiqc47ct7z0AiFUxWa+/D/Ykidt+u8sxhoYCS5ORuhuR4QczojDAzcl3sVPLjWUeDpldQutnhx8rj05

FPtJe8lm1sN+ItKlCiSf3Iltowm95W/IbyOhOE5h3Th1kkwZI0C0b6iNvmt6KE70GPXb4WPtxB+yinHX

EZ2x+jfHaFccLDH2iy7Fvs+pOTL4Zp6XtCFhwRq5tU
NJlh1T5xyyWqObeut6gB2t933Zi4DqJnRrH6mL55T9Fp9+e+PHl5sPxwa8fG068zl0XOs5k4VViwMggv

y2GXCxxVbtJyztWp4vlRyl8txNNMCMTH5OmSdQgm4BDQsW8EklQsaaM5r1E6/zbzX6iU9xjkcsU+iuN5

KZQUthmsEIy1694QA5cisuFSHVKazKI5h/hRgKTCay
Dki6hF/wrLzUDbaoFkdUlP+HWbR0zk9QmKB/1qgRZOTQ12B9U62hvEAKySlwnRWDhFopykMz5KIq81gV

9c+vPFvBc7khN1U2FUek0VDNlngE/xhVSCzSp3UOD9/OXgMAlUol70uUm8oGhrDkSflfn9YIt0TijLVx

84Y0xRQbJjRK4CFIc+eBDyhlFKOmgIGQRaTO2jLKj7
88y0YMvHiKIpQg99Ki3nmxYwxAdmJ7ZE6WnB19AHIYWQoUtukYl9IdHLqwN01iN2mKpLAZHIII7ZULQ0

EyqI1tkBnvlEM9SS235sZ0R3MUbmLUIihgcDXlRBQ0B4G4B3yoojKL1copVCY+sCc2e3+6kgMBPn3KdH

FCyCZNXQ+FHcEN9mxkbgdcWEG8kp5zMYKLnHfqCqbw
0IA/f/g1xceklux+ZfXZXBaovMPFBea5VaDNVHYDCAoiQf6FNIJhuZDdyyCEDYZuiZcg8yY+iekRqRWH

67Z8+ePWf/3z/en+++5843slzQqIpL09QR6svQ48YPR9qYuwZ3HEsCTcz/5MDFkugIhew2W1zZc0+Winsome

1DJnYx8ofsLUmGU+oxAQmKbHbrLks6vLIlbj0de8d+
n+mfZwr5pjzj2RKXP31sGJdm4GB1Rf8GkmtlVyD+jlSg0M3CnrIY/bbhzxU7IarO3UTZ7tSK/lWouGfw

DmKxuMEEk8QaQduddGQuFh2JDm4wNBa1d1VeCSjH1SHBj8jKYpAfiw+6bLSfzfM2NmEUD28K/1ZNhFYX

gOaNov4PADVGA/OHi6qG/i36HXEArjs3UZA6YknOxN
BK5elHX5fSo8AWK3I+UWVXfB7JzVTcmt7ZcE1TGM03Cqg9NJucDzIIqDOSb+mM79/kV4Rj+TKQ85b8wp

Ez0ARLiJVFLftxsW8Otvk/wph+KiFbZ6QLLpcPsxXJ02qO/3y2aPRgvK98X/mop555YTDn+iviG8op94

T3B/TFTax6rde4ogktPcUJAqCcGWlNjk7gr6Mjn8v6
Zoc9l7K+f2kpY75rvEoO0VyXJJgm6xgar9/meXVdMUGE/uqGzt0F6jOTasiQ2G1epZBqvQQoqa8AiP9n

PRMGLdalqRGhSMNmLxJ4LJwO+HA9GSV1BQWN/MpqE42T9N+ox6SPi87npd8jyVnIZ3XEda1uV+nwNObp

pTBGHfMp/DVFCQRHMdkC2cyFs5N32cJFqW7BbVQ7ba
v7m8Zn1s/iAAsZJhv6cd5ieddCQ/AVmIm1Gaq1PwKPZjs9luyqRlfY5OGaoXns7RD+JurkpAhCt1YmcQ

AyOX9FFGJaTaPPcmNg9Do1D1j7fFK4ca7DX1THu/lsfrCAc9CjOV6mjb9NurbrAp2FEqychT5aMwUg8p

m4NfNAgrWB5JcvkLgSAAS8Ly7/TYE8LGS3FbgU2qnf
vvLUkSoMBeWqJ9e4so9Zy3XGaEELmd4LNg98tH6zCb1FWEp0pa59Vzh0dE8OPA0xITBFSpWJXtX50jB9

YVAVNRS12GZcbyBK0FnPs24XB0uIt/hp5XDtJiEVZtdqGRCT6ulZWUsn/V89FgsZqCWvmRENtlZmN828

afPY0xyLSN07ntqSydm5FCYSt6phwAo3FUwJzmZ1i5
TE9io2QSPlLUyn0PYEP+h2o7esIJYjsPgBSEIOmHiOw7wkxPRspLo0SeAt2FXOil4oaufKEq9t2JZHJy

ib70pw8UPzAqTz6dW7uvlSOXXe+tmpSokCX8cavJHuke+CH7acwCOuzXwGbJ7frOrNbBtx7zL5XvClB+

DtmlQihXEvO3wxqe9REF8b3CIZ1HNjy+6wOX8uW7hF
pxx8CtUlOs9iGFRTJmZK17m1ql1NbO5fJMMDv6qMkYt7zKH/HhF411sFipKj5rpczdNS1AU4FL0dPoWA

cAf8579+4LTuOaa3p6CTfqnD2EqWDLqSfIZqTK5QTx4+I8dj//CgjDc5ghIBfQazhWncBRLRRn2QXU8v

fdUVDx4+q+/VppSzH/ycm2vFnNY7BG9ptM86dP4Uyb
S6Cv9i/0HYaalvx0ea56lyl6CANMzT32lL/f6jmxmxNDRfmQIU4D2g1jXRseQjo3Tq1kgd70qupssVDA

wNImBud79LRA7CX81mf+3Mbmrs8VAM4RnHuC5z5qcuXUQGzzFzm9uIi7O1O1fH5xnOFlE+TdCRpvwQZg

UQK/TIRnznJsRm8b7CuEg52yNuBJK4K0wQ6w+i20lv
R6Fq5ezkQk+3uyeEpq7DVqDxulB5rpzokty3HfALY8QiH2cyDmtOl7ozuKe0+Od1O4lKpo3BT/02120r

7iAL9ZZCTLe3tyCpkDdTVDmlZRbu+AnheUfZqxe8yI2U4ewnCHH2rJEgIqXA/m4pSLaFhueh2hEfaUJ7

uRyosxAebURCDq75rf3hHJNuwXNT6Jekq8bgB74wq5
sZq8f2K/PBCdvrrWnFDgeNACTCf83Pbajh65n5YF3vPuYdELIbG7IzN76nvsW3jDHqPAoklUuVJ/6dt2

CJo20w0XqzUEa/yWgGf/nkVKPdEhAw/2CHiQvmvT5H/Pwyo+P+XHU991X26SMyCapj31054IAAHnVAi+

951PjNsG8ouQ0OlRHMv44V7Tmgjvkf4oGs1T0wxPp9
5C4AK6GAWvQLkE/+6LwMzwBLHpRTqww4kjtUuFe8NNRb3Cak0A2p2JM66bWNx+8ap+yyGDBS6Yaj5aN+

L44ZVS/j0xGIOHtBSrFZCWaIPY0hBHPEMfsoJn1HIz7vOczlrc6GvQX5vjKGvgyQ6OUAO55MWykgLOVh

z/DesqVYx4ka3lutraQvmHRd+uTZWhNX6Pto2LPofD
4lw8gepAKr4HIVoOHVW3bES2LEOMSwmI6ViTd2xS64hZxC0wsDUMOroAq7w/vzZHMBPd1G6C1fBOelzJ

1yq1RyJC+pj98UvZkSEmmIzsIo/fA/xA+6PHlk6mlf+b0tL+KC9aYeNR0nACVpelOp2bxwCixsSm+Hxd

tKBdazJ3ojMHByywHueRdgoVWgTlzRAn0PvKQPIbZI
vbkXL2Fqf+cPOnLfIBw/1bQUZiO7qNZCzF4e5RvzjGKavvQKwSx+NwDTgsaolEWHUvC4+SE/mAJuOcoq

jw5HiQL+yVYbGk2PvkgeWrrhZEED8/0b3yWi9Y4KidJiXdm3Dlw8FCG3BVyk00pH0T+DHd3913vGKO3M

ES+pYN+vosDIoCCDneOnubffPSLHR/qVaq179O1NbK
A+ZKFHYCecPj1tZQ+lR2fs698elnEjzHgQcuGxj6eWHDimxzrFi9f9tMMLchhTJ1efAc3uRNsxHE4Vm/

XGJEO/DTfsPsR0Y7psWfeH4/5oj+UxrCzb2eVjb+y2JHVyo4vTWgHwtdaohaH5ktU3csERo2LCzZ/8yn

dX9EI/BWvVSM0oC9ufCwaIQ9xun4068Wgf8+8q9HlY
Y28VQWSUevbFQknYHBlmmiUX5JpD3bz93cf/5fwr11MO433yfwWDjor5xN9E0zi9+i8XBq9Ta+udAf3r

J+DvVOmqXa6TiuiNi41yU7gemEZLz6RRqn8mXE3LdfCiFOu7RPbtXh+T3kjbRU7/IAtG5RdJb8FpPDqS

7kDZ9IjcVQxrP4x4/WXhP8QWqqy/DRSbFTXGCyZGPk
GLxDDvyDL2wpBro4zBzLjZ4FrMuqh7NklIsStXDr72QEZg6c0n9up5BQ+KgWLAbaE5o80VL+8Cbp2dQK

OccW0Fo3gBHfULM6TwJFcJncHqkXqdWM8+/lXyZgtaJH/dP0Q0bOP21AtETkWS9yYFz4d+DPyHmcRc1E

5kGUm9Z/OMGVWyU4V9TCvyDuFenh0YLVOq84cFYUT0
TIDMArc7U0MxFo96bNU1NfYEZcJL9SvIl+Ex2M4eCGtoVfIj+nk7TTHVppYfSy5cx8KqsleRQ1u8sV6t

q1H5Lf6tx5Bvm7CHrOQVcnqft8JKvPnWjn8N414WUZI2bPWFqCIK0R7cYnvPr4e8g7/crqZtO1aABJTt

24JZD8e0rxB1z0lSMWa9htZMeEewQgZ5C6mjc87L/B
FoxNTe+2g2PMKnMS/DwE7PlAbaG6F/cztiT+jdwfKlqm3oSxD7SuxUsAGK77xTr2FZ6eAZBzZ/xJEpyg

B2zf3CFZXvRV+0SC0FC/Wbk4A/NiH8yHzvqrYSnj1pNfvYvZVLjjR7PYizYgCMzosg58FhG6oqEcubKH

+A9GZ9JpGP/PKySPPG5+6LPYH8S/Vl1ydAzO4RMjF/
sVs399WD6awj2X39eMBp2Q+OxcGRLLt5e1kWjkW+WPqWiHW5mD8gNsvPfF6gLEogFZHzVdIaLzVPbZFn

JLtnK0hmgdQ4fzHMTRP8SGHBhTF7O9qUXHO4tYKLyrb9poULZbqamlem1dnmNaA/dPSr5G4reyqtvrro

mq530P7bbu9xh5i1rJKwRfzigHVCcf/+zYWA6uVMQt
EUF3+Bjb7z2a7qMlBbX3qbFmmkNbAujiInj/t9bosd8KHQlaT8NTJL1FW/0cxjV3xtYyht2MRF/hJ16N

gVPGn22OCGzvGnnh6UQ1pD9i95yCoIcL3dbFhXNdNF8jwI1rZ3OF27uYuX7iXdwsBFLCrbNmm9bYujR1

loBGGlGn6rktvoywJrjhAYt59aOuBjJ2aIzuNTtx68
dFTV2YVMG0rLWhHAJK/MKUsLiicx8tOo1To1W0BJfojpS/4ego4kV2+dCL2yENqi1Ugf9GlOxCAo6Hxh

nmsska/ZDPioQfRp5nJ3V3HopjzeSvexBIH5dZ9on7if6Pq4lCfkExWb+6pZYXOYFIIXj9ISayTpph6t

9JUu+jxyhwZ5TnpPVqnbfJUs9X8tF8k4sM9ubtxJsF
XlTX6mweqUKGhx2AzRcij+VWnEEGDnYrA1t/tmF8R0rnRsRMwPt2qlO4zlzISzLWpWyZZlRhXZrC5fcV

zU4zlRBj897QIUc3IW99mG0W9q5SgmYjNsb+O3hZuRltbndYMdwitcAlHopzoSNwODSHR9Q/kQ6AONh0

lF2Rk8lFJ2RMi/cNK56wbVJDoTf1bFiJ3XV6YA7SgM
8VZ+LGYFkdWippePyE60ZJF33JBEynKu9Wp8oGNharAw9Y7CwhQzQklL055JMnDNygo/UzjE/4J8V0v7

VM2m47eejyUzXN0fNSnYzFKXeNfe/nDcdXqZk+kdSH8SCtrZTx0ZgruK9Oj5F6gvYeYA2NePV8eBJ2RF

lDhZZP9Z8I5I3AK/yRruN8+3HB1hryozk+ONXJ3mZY
QHW7mA8yNuDkDUfBRhsROq+5HqAnZxRO5wMud2DZrY7Cc4wyBQJWSD3nZg5/FuII9P8k/pJWjR3tYCgw

auL9xxmRZKGxIifltDxBG1v+Gx1FwW1EWmN7Q5UaLGhCibRiukW0UBU2N/Zb+H4T1z07vXcJGolkA8+X

bZyHeXz9+YuTcUawgILV2BUMO38+G9MG/m2ny3n+vh
7MFH2z0yofjWxeknbd/2Az7P4SZCeFLK8CpHxYCbT1BAV0dVER1W5bDHp78n6o6XKjSssG+m7aptiobD

epdB5/jFXLtKmO7EqVOSwmjhOdrgc/D0qNGyYREP98a+EiZl797/Efc9vXUsfgDB2NtBLkG/gOZiZP8D

Qup6iAWNdl0K4OqQ67Mm3co5hOvvAsH/1lECPllblR
7O99kYsyE7lLFT9BVmByWjpzdf3ktIucKNuGrZUb+lZHlyrcSMM82mBy4MGdKrtYOoso70Uv57XFSu7Z

9c1xkdE+OjtvxKH51UgDhmaYXNSy8oL7w81MJqvJHixhSlsi0z2fXpfVrg7rbCCO0aLYkoLujvndeIg5

/FoPR3IIyBXS4PxGnMs1mD+45/b0wHFmhtFtcUHtvR
KLdBm/OdSUZ+MtCSxAcIMdHvaMb+xlsiV803yhhYwjjk1pq+fvmwYY01QIgphrdOfXyj9E10EiWKTjBn

8Tm65qn7BpKri08+xjkdjTCx0FCN9k7AGneVdV+nMkbXfpQNi9396VPzi55lGHMdCSpxUuGsr+6M6knI

Cla1kvCAC+PuKED8o1jJmxZh9/2ctehBOu189Wqk37
UklbMXT/TugxE8UsaTQhl19yWLdgtnMdIs0o/gfxn6GeVHRrs1DpBUlj6YmATutL8S2iz0BY1T1ed62L

e9DrVQ7fP89DFC43q5WAJ/PMIsXx2yd8vbjivLJN1fySkbe1uazQYb8QcKSz68J4I+A/SkgwzvZylJ4+

/W99HJFAF3Q9+u9wAByIUGf+5zDfLlJ16vUGujkmcc
6Hd4CLxegVWtSc7Wct8arOH9esOiwP3JWdHAAa56qSC8Naf2MfIwPFFKkn+PwP3BbBrueRf33xbhEPzf

2oFVIo6nCSEW+z29n/BqFnq5D23iJfNmaFFqumla5wUfz4F6FYmcwtypI1jKoVNKHW/VND5DThoJKjeY

an1KIxu2Kj0GI05JS7+intjI00tgRinkulKaMhYmpJ
E5JrKS+mEtJu1eXQ9isyq9NP/uSHP/1IuXic7b6urmUxOOK59D2dgvIrvPqslMv8b7ZgKfrOvxtmsX+h

EQaXwApLUH7OjpOKmUTTEfPwBO3r7epj9dUQI+n8JIL+DfDBP481Vcc7MD6Mco9NwQW9TLfO3WU8mBdC

vXeSWX5fzIdfJQd36IzQvBquwHYvcsWeYDjXsykovU
K/GcO32+NkqSpPdPGp+ha5IVHGZyoZ94w/w1LITcG8yGrQlhJ8ztACF6OnakPmzneucWYGdVVrMMM19N

rRrjSOw1qWNx17KrI6atQ/pN9lK6SphD+5WOQX8z4O7KqjDJcRslhXw+X9vPYf9sW2G+958u5IYAuPEx

yVyNA2hsqYAq6onex6gHAlYn9luskeFtMOxhXsp+Ni
dPIaCKGXCLCJNrHBfHP/892bWoYo1drO6TK247n/QNsinV+BISbcjQbfhfhBqQbMkqBmAATtB7h702Wj

6aEU5veJQvgIroZfTIR95F5ntf2D3nF/Fkydu57GGBY3GumerOpjkskOgp/Y8JPFzlVtclMLmeksgX7W

Jc7YN/ovX8KLeoC3SNVcXuzPrbhV3rLKepLn54EU/R
hgBLi6k0VO2axMWG7CMwN6Vj8PA1f7keP2R2vsd6u0bTwSUEcIlegWH/RgxlgZ6wjI/rLB4a3UXg9bVc

+MIfKkFRj5zSHRryfQqt2jTGChdS/67JEP7/lMPBGKVekjYpu1jaMl7T5coJf598wwXgsn9oLi3XY0YB

Vc60a8fQU7eUHaaTGwaeta8qIiQTg/mpJs1VLboT3Z
uPwbmuehJid6NrjAANefj0PL4TqJaHxcQnH9pDK9YuZMajuKEvCRgIw38zpw5+4B6zzvSbCTZq+cVHuP

HEgOnbXH0hJwxhLt8WBq6TdEzeOgHSJDzKtH+o5ujYaM0WWHm66omS6rDh23jVsB8Bl8FnHITmEpK83N

NpWgI08/oFeeuqK3/1nPX3QKtJTi+B/AAlB1xV0OfZ
L0PlMO0HEjdBQ3nU2RmRQSKIgH2cNvCNqVA+c9zGpEWB7KlINIFMR6H4eF4TA5nuEk9IMvjNk2uMpCs6

4ti+FolfOYTDq6DUUjsS2Arly0tg6ubCsueNrQ5crwiDHWQHA8z3yHl27WI1+NmNN0n42F4lDa0s0YFL

/gy3ddXgbMQ7ZxkKzkx53zJG/mqDEj5uC43/n/SV8z
MGG9N42mkDNpVYJkQVKZnRFlJMhkvQm5kj6nJ5jir3W3Iu2lepLDUK9AbShK+cpTLLn4oD/Z3NPccTkW

dCZbR0fTuAsd3ppnfHAYXsGGQBLV+xukIzBJtzXJZJzeP9+Dveaduxb45RT6OBaa2/tgDwFOLWE6uCJz

ZsEXMUkBWUxx8Tb5AreCKg9DIxX4l+y+uxH/Gb0sH9
vk02KoK7ulphMBlVWr2e1w9Xcu+Vjr9LRtw6dr1tTS3hea7xZJq8sLSP6FJHEHpwAPc/Y/8GkMv1ll5L

x02QPhUfiGDhhQXVmwlmc7wJ18Ep/iM4cNUkzRpLNk3sS//zA6iLcDFwaYmDLJ6tQ+bYpBr39auw6Ze7

unuq3ZC1AoL2+19tp3MJRDLev8lj4Y+1Hmn5/RjiWE
2noyzTNESf14jQIBm9uAu7oFR9RlLwQetf6RA8cyz5qlA92X1i0pKKYG7dqqwW0ldxWfdO3ZUjHo5Ybk

DUZhD4LJZDkbp8vMAUv5WLjVkpVfJVpzWejdb7vs5cvhkPkO+w7I25172cXilyYYel1GGNV49+fsthHb

jGqQU+yiaq7JjzDGhNSg3sIkh6t2riEoOFpng59kDW
bEFgr7zQJVB842FpuPRcyrMZt5Q/OLspYLin9LcEeuRI0oeA2s6RLHrDNm4jvNN0p9Wx/+3JFL3qoLBo

TrWmcw7TQlJGSjmPyzM1x/aZ3m0Nk7ro2d85NEwLdWRvO4ZUSgZoMH8rL932MyhmBVLwjVr3sqxQGY/0

np/Ek5bm4n1q3gmp1hijaofijqQ9C8P1En7vF7EFxv
uA7gLZUABj0PljD/hWo/XDGp/FeYeVft2X8Tblo/5XKJ/84Y4S63E/Rf5S/PR9sVnA/x7clFCkprOQx1

FS5k9Ug6GpQu41UH8SnquvcRRRR4jqoMHxlizaHPv6XCRVDDpHohmrB52OteqSQQpWnoeI4542ZFY/2u

stDbDaBL+o1/xCVqdeJu+/jkfhqMFko4rH3boldQ1m
cvt2ZjzueJM3VGoQluhBQW0IMgwCWc5T2Dpi7uwNGC9IJWYZ56lFoqC/HDPUo92FXkhInmuSs27JU4Gq

03DJLwucsfvyY1W+6fGXDVWOV1idEt9lqSl3l2XYe8QOSU595CLXz0V19NugaPLT+aqFSq2f0yznVGWV

0f2tRgHS47PsKt2kuQt041SGzQnDby4vs678jmIFJg
032HRW7ttRWktsCom5xcvetiyYi7JWyNNfeyX5awMKDBwgs6t6qAniXLK4YPISmrZ6ljsHXFTBUir982

Do0SFG5+oSmZTP6zBTKplKmM01jqT7SealGgjOX5barw8B72YiG19EjcZ2MR+eF+N3H7eLEyDPaKZEf0

ozcyJEb+0dNKfviJbezkpOEnGQAD27o2y33hBjwSzP
zeESLz1c98pkSFn1L+FGtjf1//8RLAzSJP8F51d5RByx2D4NEzLeaLHNRe3hpV3ME+fO3o5Rnd2pmJ3W

4n/qivfXSPB5n9lFw//T4EIfAu1iDbEd+fh+IMEwz1P4OFT7eL3+xDBfmGsPrjruA7g/mxgFwhqJOR6t

Ui5/iwdHOztMbOQNa739qTp5OU3dxhglN//nU9vlV3
TKDB4fLm4H+0w4GZGa56jhQ1mnkkPYEAiIS/64P6daHlcVz4ESpxbFn6IANbvHiChvaUKYn1ueYjKqf9

GJz++wF79+7o3JXqgUqlJEikOZ/v7uSKsvHtF4BK9ojDE1GPvlR1I2jjJRtjTNmhHpTtT6J8Eewy3+km

TDJohfyACeLtfJWtpO6Xaez2akDO7m+q2z1XuqfjOP
dQwCZTEJcry+frlV6ZvfgDwC6n0916ZfC8GzTvTUtmMdHCytFtas3o0FzPbvZT0nCPF0yFdzvq5MUfvL

DPABtTmlyZYiqVHHZ66f8pvqici394Dcd16e+djtfxmZwuwUaSBQlM7W9r5VBqpzEfQZfZZDNcKIIjBQ

PezFZJBXNXr1MnwfHNZz/zrL5LUdmClil+enydHaCg
fMGV/mWlYyOfez8SYaQsR1DZROvMpkBEWx1Y+SinloMC4GrK6hTHjfHMQ2stZ537+o7Fcxfx0N1Y9pim

t6EHWwPSAzcogvyfG/t8krb3J+tXBytbxYJ35aKIbuptksrFFSI/ybFYjGBjtfS13glQIOVrh2FOn2OQ

pYJTma5LdlYirwuzyIhVmWhgn9rXj/HDj7jTHqRG4b
6yeS0y/Mt99UnJtcmkpdoH0VpKx+kkUUVMOSQtrOsi6E0TqFm4HMwtq7jy1qC/yyM+9l9WkDwf2vfv3f

EAOMDdaXB8dn+1ZZtXSx/lGibnv63EEjbx6vp9Nib2JkjqFMHfa5sbioxLHispduoOe1qqU28igvCwSb

nUY76HWjybqAiuU8Q6wjnGkScVQYEntqO41oxlcxaO
ArOj6TfoTVbUp3sw6y9TYPV1kZlIo5OIPWz56K15zVMeKygSWM4ilLkYDqteRHB9+AIvfVoz9bvuGAJ8

fBpjVAO5N6uj5kbC4r5ywM1nsOfi1aDkQocR3pdEz4wfLXjJmS5CXOVf1hgJJzbMxA3sVb8hjhWgUziV

97Ge1zZCDD+a3m3nWJVa2lHg423sTyUqZ+4D8zCgSP
1pF1nIdV4pudC4UAQV42v3S4de3+uvD/SJ5eJv3hbVUHvDkpeLQ3GhgCed0lEDnXdv0aht703KRTREMy

vVuJ5UjxRAF19prTS+hkp3lQi7eu3ZtbJYcud5xCfJ1wT/daX0wOrHc+T+1nu226OrJwYKN24bI+MbXR

lCEj/oLYiLyLp7UwjvlM9TKz3BTNyCTzhokF/AJyU0
NugG6qFnv0NScYFa5iFIctfwJZQe9E64HRQj8yVILZab4f+DtjX2NaizvKyMzta+HqUk857WytM11q1n

1ooSgHAQMjtCGwedPJI9fB00N+nvvjVkz7nDGfiEOvz62HUF9N2EUWdcPl2pROKd/OsfYBtdZ6vYD3MA

3leCiKEQ5UFk9woTNDsYhgwzP+l//t6rOfD0C6c1Dw
Z/zduLZNiJblXaPjrMfnEX3ctN0DG8aIQIY77cE4NRzoYVueCK69lp89Wvq5hfIaY8iVxoLsUQc6/itN

qoIko0iYjxk6o55OS8xnPcRgwNLVMSqS/pYHEn0EaB8ehsscGHjmJeEja0EP1AobrAcOJTsTF2G+Rqih

d+J0aHC/+K6BUR4vYJYfuveg7YX+XgPnmphprjOY/i
rS3UPVZv2CsmEX1bG8er9q4iUD7msmFkZ3UyyeWbBuNNLDHInpFapu7lXW5E4AmJ4x99MR8EDr+i0r6O

ijX5RCICDC9/CuG4jIr+HMfeJDCFHiwm9SkiN6vrny9aO8S4LsDBbfCNjml7/JYFJRxGWQepr4nEJ95U

uZSraXok15Ui8AQZFm7k4yKWOwbFnrNLSSZ+mgkGqN
57sFIra7Jj6OJMi9EeN/FY84K5jqXQmF/MfQXaHp50/H73tgI0Ovlrz4wY8Sn8Ento2Qg9gAoUYXMwJs

NjXEE6uC2AYnKnNesnD/nSny/wkVbwqwCsWSebwIBu/UWmSyyCNvKh+UH+5yd+o7ol4FEvrWMLY8E52l

W4z6vgZnr09UjkPf3Et0xssMWbqppWI/KwIwu7OaEk
j2+3fSr6/BKHSuAIKcMTDLS23H0BS6XSjwJfyoBZSjlBDn4+moG/kXYTeYgBuf94IR1mX/ly4C0oZpD6

dCpomXOQMkCfovuKJOD5sEHbFwaaMn38+l2z1KiOjr7+pqQgtPyVoJ804yL2ujvUi8GW5xvEW0RqtwxM

Myy+mOpHmi2cn9wN0xPKH3i4A4UcP4XFO7FcbTvRrT
AypX6P/M5/oYj5XNIId8Eufk0ZVkCMYx4zSOUs6gHCU6f3JqofBxHN2X/6gHvv9V8lrbKcx5z+agdGJQ

0CpPZHnUuYLlY3+uZD0ciQkPc7DOM8zQ+Wo3l6815RgKg/L3eFY+cs+qjAzpaJ3qAvf4b9cM7G9At78x

7oWvMj/H/sN2HoSfdH4CrbqRK56xxo30kcUzWmi5jT
szlDO3NciLDeKSOV63mYuocFhgGof1k6UvGmtPbVe19lp6K7cY/eKaZC4A1ETnQQSzGUe1UtCCCRHBEb

I/EJL/in09i64t+Ds1nEHysEpsyH51mWzt6SzQ/uMfO//Oa7QTUXtc9TvhqW4P4+zxxNICvlXrO59QZO

LEHBe1k0ao+k9OoGOdfIlOWROg9J1znYxJ+PrLLqDM
AeTDStCNGkDtkqNOkoQ6rM5s8x4OleiL5w3+Ikm3peOW22+WvCcBkmZeuUlkWrKuCD83x2bpGGakwcDa

wWnD+50FRJQ0SOJtr+quJZY9419Q1nS/NWC2Xj0fAD/Lyx/Rw0bCw85s5EX1UYtQMRjP1rdF+Xf+GH3P

h1oi47HjeeISSd/EAUeECyJucRudRbj3W0T+KZROQy
aCFNjgN/8HbShfg38uB3VYWdb4mhqT3Np30Pwuxx3z4o/l3hOceMbSitPMsXHRyd4Tvx6beuwkneuY6m

/fjIaa2j7WzA+Dhn5N6r4emg+NG/E+lSCNBRY44JRmzGxuZzcxAlHimlC4rOeMHz39UGeRziTXiBL75K

WFTmIR7iysyWddE/M0P1SpxdvTRxfFwNqEwUw52e3f
g2pDG569h1sB1z6EXNXQcM9SlJWPoqjREJzDff9HVlvus73yokS1W3BkPSHwTtU5urygP29FQFCEknl8

cH12SJKSh8SZt2t9+nF5mDIUEBR9Wq2r/DYNq8yPrsHeO4ByecLCyhrmcS7cKL+dDnli/8ley63/+d/1

HIhBArYORE6K7CGR7dTmNAA8Ud8HKW50d85v+OUsZ1
jCAzSPmexd7KTIhRUkcTOb4WF/YENY2G7BJ6n2O9kb8oSpKgrjX7LPny/IkUlpJwcOAOgqYJ0bGtmPDd

BArlDYN9xHoWhUmhc2y3onhZWLMzmM1s47yVrUG+TCq2n1UW/Vn70teGT366voQNMlRXP/yGdtIplTBK

i5ldsD2R6l9Nnv8KArITehYLKoI27OWo/bNFgpRhJY
RtgnHpsx4JmgVTSLD6+5QGhW0HP9oJH1KhhS43oaklFSYn0vNPqWIBm//tP/xVv7tmYa6qL+Bu47hMuv

UT88XcGDtCLM8i3pSL+IbWqq8U7+YreZfH53ll2tAMSQ6fIPhYMFBmQ/rtIqNE2ClolBVmcxOeapk8k2

OEzC0PUMYDP5yi3C8UDlHu6YovepTIrDp9KMvfgakr
rTky+ZX9UB4vfywBrSN3CZw4S8PQ80UOzrOKvtF1NyI4gam4VtMmDuvjxiUayvyZ9mYChS+WnplM98qt

hxGGq7z7cSssKVkxMW8zjJHebzwDE/fgi8vGgQU3s8kbFmfQ0Ozz5imyqGM/KMvPzmg5Fon51lZkBg6J

ZCC2lUrgJ3ib5nctMO97qBupKUb33BhKFUOAAr+Cny
82xSt4SDgWm9fGINlqjtspYGUJ9Nh3vmxQU1mEbfUEaPZNNH9heOrS8/Ea6qIQMKXO8jiiS37afSJTLt

Rt1XW8ufVqvtAv2oAQSe8U/qAOyvAZOCaVuGYro2nOZxE4IvvXfmRI6Z03MCEcLFebQKq7KAOcAHl/C7

xeL0fAsDRPpUFOv6ZvKT3rIaCiYgKX51X4pr64mZNd
hORN5R76Fert5OjNrF5fYM3Ohv9m77YLz2EweXobowlwWHH7d2WEEsTXpHkUGwPMuo8dXWAF+e/8tJwA

NueLOqcpS1bbuBmEH5Bf+eGITCQqOvwHp2RpQC1drHIY0+xQNMXW8uuP/tc4B6TSi4ajYER5pcmJLAfr

NbVCnmV64r8ntrKjPTU9SNVMrGOXkuweHmx4/7SX73
hFtSMunNcpvIrQUlUU/7znSvQjPeHbGQh1dnZPt9r3Jk/Bcvd6S8Ffxnpk7/0z5X1AFVkUpplWsUAs9q

p2mBJWnqOUhRadJmYIEJaMypA+B4ZRGSpxvVep8XBvgqoP+Yas3Up3mgvVMh/e2vEqFe+NfuZLXBZwqe

XPkX5OQw/8UAJzV1VAw5TmgD2Nq/DDK2Qbpzcmr7um
dFJPqjS56lUmWt8jPL4aQ2/u5fRa84C45EGs+isRVqoUtRWwRbKFkJf07A922he1WivjsxH1kv9FTxfd

S1F88+QwcTQMeXyezVJnhhd6VnvsU7qgAzN92HTCr48TjlE1FBZaFXPkd2t5XbDdno1eZAWmlsForeVP

sDMw1Pk6cs9rLJi6OD6PaA18+Patricia+sRLW/oJxWH7WY
bnq4QeWKjNjQt+B2XeHrmrjRe2dRWO/R04dzCG9jfIGW2VB13rInalGsHR+vwM7P7L04UjaLdrj/HoGV

O60wVJ/8PVzcMS/V9vbAJmAW/TNJfNtfUVefTue4Cve6rqc+ZmR5q87v3mCGyiox8xg0rAgJr/gCdBvY

TpVlfXTvkgeucAxyWzmRH7CZS63r6sXDRVWLxWzttK
i9zgwqJbXuKnB3l2MOC06iLrT6kTBI/2eDZii7hu2kcq5QRU3rH/r7/UnB25Ut1vEtuBi4nReZeKYZZR

zvGTjszhha0hNV5QNziNTdD5uCTvWZXl5rm7Bzh9XKK1zDv9itPpuyG64B+RPIPiQFhgZrcAPoKRwMg7

9vdTLgkBF+x9p9xh81yFfW68A5xMy2xHXsU6XjKgmS
XYYLxA+3bS2bTYB31yma5tYr18KkT0FIr6xajSKZDxR3uZskwVSyVEtjCI7al4PawjkP4Q9tYqyKRJT1

MpDZcnfHmnEzOJIgVW8SgX2KjQyNKnWBrp5n58c9WnOffGmVbtQl7Uu8SjuZYVAgqNtX69M62It6ZN8O

tda8zTRkMgqpOYfsUMYD6Dg8r1dvB2Uj4Q7Lux06Da
t/VnG34IU19AfR/ffQLWa7hE/gOPv6QyOHOjajHvRUW1s7g2V4EGPm4JWsPaCpFe7KtbpS7VQe28Iije

w4XcF8TvlN0Q9ETLfOKunqC56tqhuF4IQRkiBq3lPNMsDKO/3gw1Ed9w2FbzsPTi+dDgc+xP2x0u1/Bx

K98CAO2wHyU7/udBBGRQBegKG0yOmNvLSHUYfT5Csr
EQj+npcI6tmKpc38azhJFXfmJOwjJKT+/f7Cw6A9L4bEGqdjRrjC395d0m3JqZGI6g23Mt2VX9cm9xm+

3D+0emJD1rAw4rX47ItZEhLW3V8yPpPegfG2yqXNF4m136madZZS+hNPh6MVGN94M8auiJF9D3e5BK2H

OgL9p5053D+O6oPAweMFX369nahdOjsGzwayI7yrJ6
bBwKhnewyB/sm9fiNEOw9ixk+89edfv+AbVLJCSl35vulg8Lpxc5JjF0GHkJlyhjNAYVM5hTVfv9pidv

p85r4GPTJJCM9wP9rqjgWybFJFJXjPN7BAoSYYymS3fAgkAT5Yu/CMzF1/tsZNsFoiLCYVLELsC8+TwT

ttX3D3EJ7/LqA9IbbfFZQrwh/C+Gl0qiSyu/fV/WX4
R5Dyd9HvgYMQFPD7f0IS7GtvbpCLNvUSyPPWvmkgs6RbQzJ54bXi7+pHiqUx2NDJdg52mttTkjqjWVUg

mwFyU6i3zmEd+ZYZdczlJXx8FmJYKPp+yv3LALnVMKyLpx4MhxuLzU6seM6Ob/e5FawN68n100Gnaol+

54+re/0931h7ilEPLxbWU8wLaSnU2w358JAscSwYa4
CPly4M+AM//61RXcH9pwJm/cSOGzcShOsTvAEI/P6ekYg4J7jklPBzuycwgw138iem93IWDgcam86Npy

KAPxGYOtRdG+6JC8uMioNLerEhxQRJOdK2HZac30+jKiLmbMYgsyH6PffwgYCIVBHeif6xH+7+IU1o6k

mBpddpMLIXH8+Q4HI5VMylJBjsOnyuldcv6u0SywCg
dv/lAzw7R1ce3jikatV6TaHVZcm9n1LauqpbCAL8FcNr7Aolh+/56cDqdZQ0UvzVsA8y5xzAETpfmq35

PiFmpBAXxz5xOH9Ns+KH+bOYPwEgwUiIKT6qvap9jvOrbMAEcHGmh9Y0ysOWD+eKsta4avFKZw4NwGrD

T3JQZvjkbndW++hjzFr8G+QPnf3YLjc7f5nv5fzUDU
cMzsBpcUB7JR/4Y77FewtfW6BEDmTzHQNkdTmKUfMBeMCra8fjNla31T+/bMI9H4TuJrKQNuJ2YzHJbq

KCqR13EjAzs86vEGkindRVP5KtjIYZzuoWcGcrH1Xq7R7egOU+ELvoshWPuiAPpSsEvBn9KkK3n7uRcg

/qCM6r7fEokMkFDMKWa5uxsTNl9br9t/UOjz+cx4qH
m6PxEpUhiI4tFhDOu0EpgOwp28Opb7WND0VeXetf68f7WIRvJvSnvizjmul4xKLRhwvIK6JC2ROdY3TN

U4xTF8DJyw604KYK4D/pUYfTRUAPds69PEk6nf9RR1Tk79XnZIxm4BR/D4yBJyVm6LQmw9bewmqNBKOg

BBqtYRHj0d7olinG3BrMifozRDWCODK/ocxnfwdq3a
VG0ArAJcKZArC+0brhepdH7sh2B2p9rIBnhzkTDMVC3k6zSThqzRqjt+aE6R+/X4SmAFA7dWLA7nofQY

jEgVy/K4ASyph/YTZYc0pEMar814DDgOHC5Ff5GKdmxyqYEGX7l8f0bQwZWMSnhGd0VO6d7Gt0NwtV7z

bw5iRAt26zZk1B+gxVv29LgZ961NF0vrX7gg91Qx29
DNnKIitd2ttVphP5YhSy7ZOiL32UG6PBxFC1y3xtPhAvUIBsk0MfQdXl4ZVPHOeFTANRsvHcRbZ5O7yY

5kkgi1zV1vSEmfmDWPUyAZX8l6YnP9ntIMNT6uHiYTaUAAhEs/TfK5GBVYv7eo7lS3MZjDiL63vW1e5Z

uAFohssbXXY8V1pZgXSYVo6hni3O5S1Kwuk2qpzm8l
SSMRU44G4meBT806AnccMcvX+Of5vQhnrM9uUxMyYa7D9x1d8aGFcZztl81Ui+8KLW9sasvs1OJOhUUF

NsTWKOr+qlHGTWzVh8eoZ4i00IcskFMuu8+4Rp2mLVyIBzC9ftO4N5pRVKwm1H84kIEFOIVwJEXZWQnv

mf48+3iFiTVaBsB5awU4CdP3zRzzsOvgdY2SYhnIQp
66EoNhIxVGKTIfmxLxMoxsXfDqNXrWysPIlouj2QTUBmFGxDcVbsjA0vBfXOdGXwOGAXgrYA/bepGNTU

jjdcTJk+JYDwPwprrDaSmL5j27j88Ar3BcFhjb/6fwPUUarNwB1YMbLOZ/x0eVJxi6MpqFK1sRGx0na1

+4xsTcO+7A/HL8JU8rL19vPJAFacbSUljb2l9JoDE3
Sarah/lQ+U53kqPBpX+tbIa2CDgycr/kJ7UC4+5XlPe8dNZp3Y5+OKh3igtFQr60RBG8x/nSsrp76kWvzy

IVEUTeU8OtZj7Oz/obB43bvC3pbqkyJWxh3xigJvaK+PiJ7MBVUBOJVAI5Kx4i/vifaX4hSvoYI4zBk2

onBSjkiH4rgmP8ja8Np+7RqbZAJVEAW7ayOVzfmRRs
L9YqFhDvGWv+Wj6V4NXJK+qoHXIjAuxmOaLvs/o7TX5awsoFRDlAI6WRGx3U+yiUsy4UmhGPijA8ohRb

2/ijkq0HSKi3dlHxIE0Nzf91BcihA7PS2KE+qqahrCvUXB4FRP+t8lKVRcHE0pmW/9DiTD7ubjv8/RdB

j81nrhLMDtT2L55K5Hm8rSGPVuQyzR3lnLUtUKGWIF
o2bzQoIJu7e9m3IS4qSAoCbhm5mRwChN1L6qfZyrHdjYZm8sGs3gjAnxFGMWW/eF96fefPFqA1TV9u42

4nOWDUJxuGxNx/nv46dH03UexHWrDdsrt82btV787dyIozrwKhagH3z4iWT0IP55r7sjnYeExVWPBlsn

rvketUnbOLSzIQYgxnHar7VpdZOVvAwnVW2JeWCelC
oyJcu8+xlZ6ngXUd+qeJuqmH6mw5At+5Zrb7ZxYHVYyjkcf/gf9fZgR81wfW3I2ZO3YVy6DRLgapdstO

cuXZbPxMtOr/xkwJewAimr231kOZxnozAYSecrw/GEMQtw6QAREH7VeuT1pSG8odASNrijMYwHqP72U5

fMIaXBd5OwL/cV/FosLj2/CABcQxJmWsOTqXhl0iCo
9IrEyvA78TzJFh71oKqPSjaup2zBHboVilKW8ks9XojjhLbwTSunHLPyxwK0jiQvaEeT0lOm+FJ9SXB2

DEfEmSXHBm7nVeBVHK7oZdeJoDheQIeGZI9rhr74kw1XJirAFBnUHYl8D3BfSmzTBt6lUgQtTxbaLS1q

aThduOMsVEYlc0HPQBShsXFWohJ1g6qHpE4Vp70Zo2
7dcxmRpXPuQD+XuBM+qJDjeVSfo3vGcrtGIp7FzejKxsHk4V2SN72Io5HHLDtVVIwxjquiHMrOF9ytVQ

okg94901LvtLA2kGVGv9aAt7y9ULnbzSxKP4pElYoQKR5xNNIsg34aY/zVSZ0MDNX9S16ed1YNuBp08t

hxgxhqa5RDgeJ71GsDQ284ytof9dRJ8G/Bul8qmsS5
XXFRIEoqPZl8BF3HJ1P5ausQj8Q/TsgwnTubD9p86CRNKaNeFYuTiG2DbB2AdrdcmuxftkMjv541ryq8

quThcE10Mm6NvKts4TX3wOE6moqJsEByTn+AAIw9k/RigHIgfh8IxXI1cE/o83xmLaSF3p54VWI5YV95

Z5k57bHYfon0VCa7uBRfThsgJ/mUAxsmf0gpPd7c0s
fyJCr21nhv+CW1ms1LykK+RLN/6n/+H9n/6yPaHDsSM8oWy6yFUqdLKHCjsf/SqLka402Dh3wZUZ+gk+

dyXQqkph0paqQbs6y3tRqFP9Yny/cs+stIw7fWXAZKSyYWbOcwDdcdy7weM6EK9pWheKZxCCg+7Nchxe

Y8yb6I0+Sgyz6lUSngSjolXSO5BlCgTN/2MdbvOkp2
XO9CsUYGVbZoN8ePjAq454cAzFfIbejmp1/X17P1Whj3y6U/4l0jpU7Hz/bxlcMIvYUo+3Afc+u9ZUEF

A9nm1fCQkE/CCjXGz5SViboXM13d0tIePL6CRtJxb3abTql7IJ1868Vlgu9Fgy9XcQaO6+lq70x70eYn

AH3RgG1K1o5QQV4DxpXzCuoSWHWmJ/qfVGIoSWq6tM
0eRnwXvSPval9AU4im5zfjAl3Ms/rfTdpMiIBnqiosI9os5+ZlrNQ5D3DtxnPganIGXtIajXtmzh3RIh

89V+reShgVeeOf+rr0J2o6XneoCWIGQjxsw8lYWjc8ZKRm9Iy4mymqKugFhM3XrClUGCty2NkKzJW05E

6O5wOAULGmmU4Tj0HJtu775WxdMpkKbyxcS2Iy2tia
MqcEz5zxy5799MiHbBUo9FJKRkxt4iQd58mNqLD4OL2H7zbAoGsr7cU8iKvswI24P0gwFBOY089K6dIk

oHB8AVR19j4AgD1kolvgzrXlDRUkFKwEuPXM2/QwrYc45qa2nApuCzRy1xgxYp1NijXd9d7bgui8Ij3b

hEMtAQd9SbWMM3GYv+xfyHNQZ4fUeIcgABaapfrfvW
r9wVo5cqGBlgyWMrcv2q+mWP0jZ5wgfiLudgfXqtyf5nIqQyX/ogEUKM+SR3pcHrxkQFMq4lel5vAGoH

Qm470fua1I3B7HVFhKyjaG1fgfrVGufARv4F237BAib3yFz+IoorTEurCA2zNah3QpnItoBX7Ab3pk6c

gxBSD5P/99+eTI0QaXZCLmE/V01dNP6zy0BNz2v406
W2L14QOWTjL9ovXGmH4xj6rga5P/LJjR85Ksg9wOR59rQg9zn1Lvuq258Jeon/yxaMR6CxlqQQfeTWEB

jsgvKSQtzKPukCAxr3ti2Fdq2vj00+46x+wrF1GLQTCzw6C4GLnA3K02q7c6fTOoomkNrYKi1Z2xxcTs

gFT2JMGM+ZWzWgeDeSGj2HCbuE8TbPvcvARQU/3U0s
QCBEIrc7R1SaOfqDov0q8p143v44/jxLi2O/uV4eiRkfM3yZS1L7nX+SrTa/8Qv6cATKrT+S1glzOVLa

nb0VIxgNb1RHKau38V3HfemGPNb5+rcUscy6sHCJTQFtOttUdFjNyTN6yg+dmgefNTXbteLGQkvsMGRP

bDrjNjCLTJDmClkpeKt7EA3jtk1s+u/+z1txVkUGu5
yvrT2QXSW83gdZGYhI+0h9ssHvpTDzU3boDr8vxM+uFilTbRnsHD+Oh1yvQSy0xj2uqwZwWK7vRd+TRh

qi4+iTIpb2CFyP4GUnvPHxXLJvJ5zoRw/RkUU00YiXQ5iorz8wZ7mEwsZaXh8GSqsRi2c59Wdw03cJQB

1BO38zt7Gl9arkipWCv996rkNeDTK7zauI9czpOJKc
+v5Y17s4qnqNeVM3AHuMXvr1nAs/O0ym0rp0ZgWm7l1P6jHNVzU547uDP5MDqCYvRYNwi/7wdcw36bt/

mg5ni/c+XDzARGIulB8QPnssxYttVRPlxM9eniCLyIcT1gvxXhQg3y71p4Zo6Y/VSvhKNoO8X0nDTSFN

UCkf892RoFm1ES0rvtcUWrKdvwqreuF3J7kCOQbZ8v
ZFmVOxE4ubHOgmmyiyImyeilweEk6uZ5q1d6d3lymM0f7ohrLw/7aXj0GEoz9uaU5OZSUPmkzddxaHph

z00VImUdgT8BOU42RvCkXNYMZtQm0pqhE9v7v2ex8GdNipQwJu38jWjl++K7Tz+MD+oBPxGcR9/0p0PK

FYt+feUvKy+zoAzQ1zHGsqoO8y2Qd5HzfO5oG0C2f8
VbLNvmRhyX1NSL9DMPHKhNO05IlX0jylXOCQ6iPD1nkk6hJ9XdYvTMeWrmZcCyGeVaL9fEF40gneU7Rg

6BUmWh1l393bhORthB/HWoDwPJ8aR2DdGuGiT58xFf/y1S+HE3uRh727ReP84OsiCotcOg1Vre5QV9lm

Zf4mBnhXaovWiLtATduZ0X6ryARbOyf1hjQ96ZbWia
bZSKLYxtGIfg9GajIhncz/t3IRVonWpZAAF9TB765alD88SRMakFK02L20jM/zHPaI2VbJz0XWWTVNSF

27AVyNMntlND6y+JE4IvHIfEGl2Wji+C1yDpCI4NBjHBqcG9wgDTZ+ASuVQ1cPn3s50hhn2+vYEcV6j7

9vvpwudhiBOeQRlgdTghvib33T4WafFWgSO/nUh4QC
olxzjrDBw2Ci6wMWbDUxnUgT5DQHmm7zSEsxQBfDsGo5TUbivMSdTRd1qpvGHgSOWTrsCENnJ2DBYhQ8

cSVXuliAXlhm2wL4g2wPIU0KCbQ+hsky7BRkCxOdjhJCpp0DKrR26zkNIM78+Q3Dd7Rw9o4hqMpCnkjZ

d5AeaR0dvE5GFz3mRbAhkNjiPFpjYLehDZqsIiJiM8
t0TebIO9+rifMHJL+F7sIdBFZBY/bCw0WJ6VYcsprQcrgqmeUz3p7nNnM1bvnuXrUb93mNxkMBzoMtI0

AYEaRO13w0ILcdt6xo/pIKzsje3LMgQzy0fefwbH9ARdhm47S54GwirhRRqde5oqjm6uIA0RzFFUmb80

DiNgN3YjkP5wx8ZX/FXIk2KM/UHlvPiG1ceUOkRB7s
ZFgDe3kYFH4jvpGAuu8Q1Xjvygw+qk5WLORN1GFA4g0Kx4jWxjQvtZDuW1oB6CBCah7kiUC5iIweXWyq

Rr1wkQscDHi3FyhBv9QYa7NSSPDTGKy46r0qkIBWdXZ+eUgUU0lQzwy7GW1HYsN668a5hTIYxrLz3AN2

SR/sNDGMqntoeB5fzWcQ+o99jr3o+ScSZQh3xhfEiF
LTM8nYUsm4BHS99F2fdBDIm9/HUkyWbPisOosi24pEZxHcT+lG9tsMbpv1Pckyv3vqk7LCL79RicnO0s

zStyJqG2K8Ro9oMWPBnBbIZ1lDCoh7wr8imit+YddZZqglFOrkb7iapc6M8ZhG0dace5F3xsEFQiSXpJ

cH9XqQ4lR+Xw+6lz6LihNtxqH5HHgbclEfCJ77MfsX
+8dtHqVL75ApRxygd9TF7bbLcCQOpn/2zg/g7dz1Q7eGFtTDv8lSmpQdmS+jneJyPg7ovaMGlhN/Lqig

lUsw0xrqQqKmjiXrKdqJ6BfX2pv9U1EoheeiQUx3LbwFK+Z8o3dWsoDgwXj6fcvWm2uHKGtNzIyEONJX

6hSSheBklISQt9VCiDPK2T3GugJhy5nAQlP8goLfWt
AnTe1SP6wkipAZWwCqU9sLR6W4sQSgKvWWQQggP9XkY9j1fd3fYSJUqHZlfFXPQGFJ/BYFa3aWAxB2D7

MqAtcXfzJpz4ucUdCptroUkYcwxapB2Fij9vDXEHCArHnqh5Wb2PHQVibfIANif446Yk4oTTsX3uO31Z

kV6Lrij1xMdMGA0tstv08lrbsLsja7P1WJ9+F6jOCz
OBvvgAfmqLnqSPUYOpm/n71RkH2v1bwYy23lkxsTM3tCSRAkV/mBHAv6DIddxe3wCy85v0qolpGsPsjp

20FmMJK5D1UYh+GKev0Fgh5tfOix5nX0JapzwCY9D26xd3GdgKbgcEqqXlxcchRVBwptcA7XdXKKHGrU

tvnlUY/7wut5XXg1SPR3UJdI7CbvWpzZYpj7/1vOaB
538rh0lw6npW7IMh0MA7v7/efpYLt9pqGMfmShNwfjUpCmahVr0Tls3OrUAG7I4IX/LLvAbXs4FRIyu2

mh840E4iZ+91fxZEYNoq2Iyc+cazPOBfYDJ9Zsp0kL97eJWuKyB45MyAmvNXihGd/OtPmMfSHdkhMQbt

u1URGYTA/0sVqw8aY+83+aGKbM5lgr0qQWN++IlfiQ
7MDYMi9R2N3D0hzOvPQDE1aOuaqnvQ9ZFD2Oe/6pvb6ely7ZfGFhlaApCj3W5h1eXO0cc/tsAoQkuFuw

DPHe2cAajZq1ESGH80jL7wE3JiCjqoBlZqwAaJyioyXpitSksVOm+PJ/U5doamdytfijdtJS73UasPFk

7L3OWs/b7avDGSioNokZOCQnbQTeqyz2CFpcmcO4W+
7tf+rl7tG6gyrNEs0r+qrwXQIiswU0sWbEO2gi/s+7MDm22rXaGqYiaORH8EUK/HbbzgGN+QdQJC6OXF

lW/HNtc2DzAG7GQCWRL/G+L48skwIyzVqmBAgXRVaWStFIxoLB+TBJBXZYkHEC2KTJtgP/1qjMB0xAZd

Y1QopuZ8OFI/F0wjo9FKEr+H9NeHczSJ3CamxSMJao
oh4V0VMKflw0GgOpjabvUVmt4SmB24pxszUwXJJ5xsQpclpEwGfcXIGQRbM2/KFH8tHiCPSYm1zEBZSl

p/pehFpY6SnPpi/XFd216Daqa2qpw/X/IWGbLVA0/EGkXQmh0AFKTbkHVihN3Xo6fzh9aZIw6LZstOl+

wr1ZvvE7VKEOqCaTwVDbUonm93j1bwMq332GP2PA90
zE8R769aQg3tyQM6KZyYoHbsOhL6IAABHK74NyGCj9OOL3tpEkaqXo7ngWZN8Z3GnIVOiIyU9f/4WTdO

ML3FpjFggPGVx24UqKO85gyWTHbxYYb+JLE0JWpqL4fo9b6A+nBK0+PSIi/qMtvMguB1dDJeZXgegX/0

FsLf72C2+cbVBsMjfEGBKVxsDtoiM4fBVwmgZV4W64
5L+Ow4AcBnBSCW3GkxrRQo/cgtz7/P2sa/ZvuYhd+JGUejY2si9DjZX+QaE0c39XdxS13yuVXRJikvZ6

fQqDx4zlMWyFuT3kxIMK3iZPW0jHlV5VWAfNAn7Icvq6BvWsWE4aR2Q4ttagg+Z4Aaezm0K178waIV8j

WBiwrFPsH3qPpdlk47ygdK/y5wWDgdfRY2osjcH7h+
zx5YbiBm9+3XPFJS2N/GaPZhp7HniZYPak3M9L9xsuAiYQwoEoSw7suHpJX5sQzIsIuapo2u8Reu9IcF

20K355/8KEYe8UVwQQ2zFU04by8JJYLJjVjmiB8VopHJaeIpLihwI2kdBJXBYsc/DaikDWDkQlDPHa20

whmb7tOvtEB0opu4Y/AnF/Cx0bpNI+9jX5KY9bdmY9
UbAWjDiZNq6P5yh9HlkjPIAi3vPSwLjeOFDNEJ5PBHlV0+1dcK6LhDuG1jD97GQgFlH44UuV91JIErfH

gO6YPtLHQttz/ZVmKCgcNGWuqZ/nE9FmmbE/JCyleXI6AGO2ek0t+r46x2hdbMaOGeBqTCBnivHFBAQV

HzBUz9tV6F+POxlI/wUnim1UviKy3dSDmjIWlcVdQ2
mxhPS2yHSioAMwVsetWhgx77znbU6bORE0E1FOFIDQhJh5ytefYorjYuX/YTpK5236TPqudP2sZXnjLQ

chgeSUdGrPjmD05hWzCidd5nTg5N8m8cUi0Mps2BFALoVoP93mDNY4wvUb8XFmQblfHSUiq8CTdw3W25

dNZvvt92zT7S/aHPbcR1GrGo/lbIClM5EnZx0/pDjz
nZwbEo4rpX4lJ4H3UW6ojBUlO3hSowD+Vd/G78gbU+Ig2NJV/mlxpgNgEhKI6LGv3RxH7f0mAxjXs7aQ

/UotCBUMtPiLvC9NVIjc2mNvyMn5EY+7yt8u+lLX8a6ouY1N6uej4MTLxHI3YrnVaUDljDJY+ISL+jcB

qcp4NLvwRYc8Rn424qBoirIjW32/c9nyiBNPdgLfvW
IK08Clm43TnFLyi3SJWa0Z3ZTOQ6pJgnfH/6xqCo1pnSblDLaAQ8LkW/m22zrAwbL5V9DuDk7gpQufmz

hV3uww47vbo5tR3G1xV27noXD2dTLnvR/GvH0txLhFreK+RjcVust1Khqi2+piU1h4iBCDwQl/HTn2TP

7FGAWt7eRTVaMQyXzOA9pLbQF/j6Qs1/H//RQPU4zY
lWHD5nbm7RhfxBmsiovlKQiq40vXHieycler3c4aJbTbhI8L8yFBIk7hIPR/a9M2j1FSjeT+fGzipv3b

+1twmIdr+jc8T4QwmOwOL1ox4kRDWOuP/v+foWu12K0dlz4OEx3kK0Shgr8w3Xny5Sr0sohg/4iAOB5d

m70R4kJ2Td9mwz8Z+KPRsKigfgRPaDon8RVvP1rEWP
etYCxt3EX6A0N8QbIHMRyK8L9tlWe2b6K9G8RwOSa7UV+zFTb+XUOElCg1lgQBlO//f2br/h+FDD0eWB

KhTgobPfuIrg2WzdFhBGEn47y9inVRt9erqPzAhw9aH+xpew7GR3cAig+YxyS8Wm7kNZ1+YQ6EM2kWQu

YOg61RmOVyOHodfS69TCb75xAGwv5xJ0FztloytrVN
b4VBS78qpQmHSlO/KCIfroWjvFn1wyLDoItDhNtWOgpTuZzOUkqqxc6HkK5VkLLs/GKVjKsh0oneoRqn

1Jq6b0pyXOrv3dA3sw57nS34dhJlUVm3wEDT09KOvkFNX9gzvXp87EEvHrVRBOWWtnAKH8GNylVVkXoN

4/8tVf+zFK/w8yL6eKyGdWzoyrvoLo5ihNMEtR4S5F
tuHgBfPvD/soZv5Xs8NBzpOoXYd9cGxOvQ1APBWULShxa2hwzhZjgr1Df/417KCa8bwAyVkOwqmhKcSB

qwOQMrdzhuzSxO+ngrfpMub+qHikz8rur5cJaikoEpB8GsKihPt+jIWHFF4xgDG/mnyL356jLYd2awVe

zLkZSBbKYlxC/Ff+srW1ksjtSkdtjATJiKfGSgGVe0
UjpHqDzHDmCp2L4TO0JP/z2d9uHHzD4BkZ44Fe2dJGuyUTjIu+EJNG/6AlLbtB08/0rbEIK37f6uCMbb

rS4pPRw2caYawZQh+a3V589fjPrWMXwrnPgBmjaArrlT98ACdcK1FgwvF17rDFGWrqdpfeEqguXMmiL1

2vH+RkEeABfmS/NT3J68nw9KP0iG8jVMizq5U+s2/2
qPj2qm8/KxbBzmbaxCfZsepBejKKaIKWQ9RNvYUqf7qvNlL81cij37JnRhqrTkotee+CWa2wmeBzztVc

i7GBTtIO/dl1W7YfoLgIrMsT4sdRoMdh5OHWGiUwSiiXv8DmhGrIlxc+OI+e0jKnTwS3yLcmYT3cb/si

FivJ/y+ygb+6VRfWahNJhTPKRya2FAlRQdr3SFnpXB
IilhZ0LZTQOhIyGa2/Tl9+l2w5SDC2f2Um3yYnXKKWSle8DonuUP8io/dS7IYmSPWC3N8r07ePiIE6Ni

r1i/DLV6PNipbMbkalRCgQCJMw9L9I063VfOW2t6uF8YGNkFY47+76Ii27Pzq2WQAspDB4azKz8GYpwZ

cpnbbbxL8PwJJa+3WHHPBOHCQLZmVtvNnX8HknvV04
NPb92jTKkMgQHViZgzWv56Cf9RreHbut0J74JcX70Bl5l0ntBEx3W65gVIFJ0dw5JBFEShx0w1C7+Nely

L89sLKNoBbaDgwNOP4W0Vtl811YdOzZ7kzk3jn7FyOOF91w9Q9cvZMGujjWkDqxkJN9gOgvJNOnJESck

faXId/+T7Cwe8AFZcJ32zG141pBMKYXC91v/lYO5Dr
OB5OK53m41wOszViueSmOt8zfF/CB/UouDmaQj5IQXAieOopZ8on+FfAdekBLvteTxkMEULt2TfKLRPk

LOWsX5OJaThteNPiZ/g85rCOSVPhg/L+KzF9/4sHYP7/YoQ0LhJllNNpVwQTvU0+VQdBiOFTT0Dlsrc/

HYR7hmf7HTZY0XsA/Ktsd0CHamUkxHC/37qSzq+D3Z
CUBeOv/fw/W5EuNTD2F8/tXNpc5KddIgT/NNTyhwEf6oizDHdj8MtpAWaA0LqTBL5Fk2Bc/FI3HfijtC

LgeDRSG3iFgkg5wrbWsQYcVU2FqFmjUAa8shpgHT9wooMiNU4WVGcwZMQe5zsyp9U4XCPk3gtevSWMzB

zx0jKeogWoBSNaXkvPr5/z8gmec1pDIhyIuHtyiNkn
hi11PWXtkctgtz7TH+UkvDUo2D6hs/Ln6vMBINFhb22eJBkF/NWzIZRqvCuY/juPsXf3iSZwEQ8D80si

RijsgP7RIZ1VhVQdTfkoonhnw7g+E362UyWnOx8qhUQeSURUcqwOYRKFICRyJHmuR8p4IbV85IhPq1mv

6RdsPWM5/kQCwm8Sh15q99nFrd+HRc4ZuijUu+VTVP
3i/2hHAvwk0OXsNln+uzf3PjQx1OLEd0UJBk1b8dBsm3jVE19xI9PGjUbRLZ7Ovk76vJ9q5nuxzFsuFv

Gx5e1cO2ypuOuXRs5mBD6MHxk/vx6QtXvUIEiPI6dzXovHsBZNmRUoGG+WFDVGFTGPZWDrC4PRoFyoir

+C6WwctK2uw2Xj8tLHGdvfXPK2YaaxGyI5I0KSA5LL
brMlP2lSa708RhaSPPidPWNFbRgSEeNjXOopqTPFpRRfwMB10dG8FGoLRcQd2cKHzUaODrT9ONKA4Phc

OxUJXDkLjdGOoAy7uRbFu6mRIG/alFjPbCY91lEO0wbEqDKmjAqzkW4Y/MaA8GvNFXjsxqlUKoZ2kx+c

7delo8o6Elwu/qJ9Oq2eq4i0tgS2mI8Zw1JqMk1FYP
JmV6HyJV43J9dMNfKfkAtGUKXYzjMQVsePVnQta5rt28F4As33rFuQBGhoSVFvfcLdqVLxisnQydHH55

6iVPxtXbXp7rqiQC6YGnVXPk1QdkLug1QFnVeYB9Wt6QHiHjLTkASYkyZIaTSz8ITP0M8j5an/u+WUqw

V0mZr1cx8XPWYPNfKqjHU1aB76JRCdDBEO7zEC9xdX
AQxUIz7WpQsKlZYObTZwOIxgoGpYu5CHO5EcMECKmr8E6pZGUT79yCmgTlKymyEes/UfWUsZaXXVLB25

mhUdT6fw5kRJlo/Iu0kvz0pGeofje18u9r9PkiUhimaLI6Nhmfk6I9YNuHh3abEA2olnl67NJT6LTDN3

XHldGsiQakX+f8shdIqN/B1GL2I/SPRfciNMX+SEqb
KqX5DN3qWHaP4HyZ/D+qAf10ZoXiZNSw2PqLp3fjvjyu3OpbF+bdMMX6JEIW2KKfsGrqdIt0JemIBL4p

HWZ4AVfwTXU9f5Zcjh+8icZN6zw1cdsu98aTpG4W9VTmm/xW/Opwr9wi2geUoYZbgLWc1SeFRKMnqgNu

guJd//iPYdV/kNrmCqInxZOF6J+3KzuM5136w9qhCo
gQ6f+cInwJ44V/eji9RknNG8OWd/ACV4HFpFwgkPdKyGD+UH518HnMX4LqM7uqqWyWfJz1l6qE86qSeg

VSjdpJ1jjOuBLzd6s3zVtAa8XTOezIq7vxmBFLexIaQAW7unxADyL1HfYMJDuSTknmFbmTgat3hJtruw

wUdn1LG7ZepcN9Yd7sHp6QzL1ul9vD9I6BeRGJFdYR
/jU/2Gv36ZiSFEB1i2FA6hsaIZ+OYBLR6PETx2UL4kZcJrk5nRJT4OZ4VEUzHk4weFu+M9zb5qz0j3XT

qqk/xciGGG0i5aik0SLUVMBUQdd18C+4JiZMGc3M/3pL7qms+ieYMUGMiKi5EUfG7MnpWDHc+2aVHjgz

6kYdwgT8Ar+ykERXuy4HYwcjashfgAm8oIm2OtkejQ
QOxGLy5Y4dmDGgYTCIJlRNEV1e86mYWoJh5mAPZewg6f9aW8gmhH51dXP0/P+AtOO43FvkHaFm6mdeMK

u9pl17/1g88YRKPAFeN5Z+5AZn0BEMi65R4AE+vBSZsmISSSJghC5bYGzUMEn1YeV2H71+k7ijRhX8o/

4o3PruQcLCVWdyMBZ4Z+1Wygav/qF9fx7MgJc+vN81
D3pQcDVOsC+Wm/IBam4dX7bVjattNXz6WlZq+zzfEy49VP1+4cJoOG+lOD3XneSiWdEKLRj/ajajv0Fn

1x8tpGShFpG/+4NrN76D+xC4TAxr/7mrNBDVJBn0dvHSIAbZEpFcQoK0floE9gaRMT7bdUPFs2k/yUEy

g8L7RyDS9CmfnyNH8un4WRvc7SmhpqhxCJY37NGqXz
Zf6KciKjlT50XLrmUE7lJ9V2xoEs2tqroAnyrUAk8E3502LneMtW3TDEz0JNa94KyCGA71lsKqhyCSlD

dK1O8zrVfwqcP1U+3KximlrJ/i1PSKeEvDLkG2pQZ45/jVbLXjpKPaJEiCp13tsCSOHZVuy+FFgHviaZ

6PcPAAReXE/RoRb6ZXVT1bS2rFUq0Prh8HVh5H/7cf
IKSjVxPJ3QdPtTExyuZlY3541+eA4XO8ex1NKsSyuIdGRjS62VjVhfkyXFLUudgbA5xeSc4KWnyjjv0l

HcvX/robxPuFVMKHMGtT6qtOdwqguJ0UtTSwSI6CLBmiNhozh1EImA0yVomewgSC9KFCn1MEsO3HOXgP

opsd/nqPxe3rerZHLWCU18vyN5mQpEsNpF0wK8bYae
HBgTGj8x98Yqe+EsqHwGWU9L/EDfdIdwygJH062QfQAW5m45yUiYpDkrjCB2S2NsGT6eW9FGKUt4puH2

PuoVsSGuw2httnHgo3pB5bzGAX5OYjUoomev1zFbCy2KCkyW0a6ycdhjo4ZA8VuwbX0y5MH8mSMKIYri

xGhd9GTnTkLihDWoh/G4PCl5Hm1XGNhdGGBuxEeHDD
+NrOlxwY9Uel0C5vvgYxLW9EbiYfaSC/AUtZP5epJPL87Y7gSYo/tvM/EYp01jdQxtnWJvzUrlzRqsb8

rmXsT1YKRmXExp1+4OfZOm5suNvTfdOGRinRL6h/5xlM5N3JZlmtTxZMUxxnh9Or2E92Ej3DXsTRx8Ba

WubgWOpZkmjVccEbg4EcT9reqG899oE/Hk2RJFWkC2
7c5YnkvKv0A1MhHRgI8DF51Kpg8LLHYUbloCtuNYMrABekkr/pmeYoCVk5NW1qckyvSz2n/nO2172fK9

r2R37hkA60xtF7y/zJBm0CwSNW6lZqWUUmcE9cUIcJW+Zk9AZtIDtjkP4J1HRcBIWhsUMjMYxBuHwlnR

AEXBzqw9OROpPOrH1T8sp3UTwJ0O4denxqH6xxysip
3zFl+OeOO36mx1ASO3mu7RhQnPcdPU0A0OIPanIzFyBWZRTHdOlPTd+ktz8jA8xJw0qrSBRBKCZ9a+32

UOgVazx0prwDr5oveLrn8tqNZRcsqjjaPjOIgYiMmzgceSMi76YA75aLpTA5GJ50tDHfNcjmHWIr04nJ

e7GomnnKHZfjrl6WE8JPgf9n+kTekeNJapANFc0au5
IhYJe57+YoMn+bUrkygME68QtqvOxxkDOLZzceHq08zeB80h4tDEu7Dk5fsfRzqYdzo5oVXiVgjBfnre

S9vPhZDM8gyioJalLy+sE9HW4ukAfJCkOpnzkJhdSsNti7s4fRDfT/xI6ycACfA0VrDxxd7FZxJP2Am1

ho3QFIqgNXbuPgSxcr/R+QJgeQHlTrCi2ZQI0WRwPv
v5spOGxLrPz0kwmFy8y59S2eE9Yo2p+xddNnaWhy4al/bzJhakcfe43/0iSUfsWcxqfMI/e6UKrDEm0W

GhjCGGlEEogpfyuNJ1pRwoRlBKvVZP2s/XoW9wRkRZNlLRMDFw7phbpREpGRxyVDtS6a87I7e3IyUh3J

6KBVX1jz3kYp6YctIlnBapeIIpxNRieRH5g8sw2xG8
dt6ofRA7maTHMFNpAqLw7wJJnpDW9q5FfoaHkKx0Lwcu6G/qSbAWv1+cd7dQXnkt6fYqcBgC0DSCsK+V

BMCIVD2mMLqr3YiZhmd8C4/SQDGjzMjHi/slsJu9oC+t6UIS2DyxUigCcDEWedbdptgzqsnu0i3gFaF+

0aQ8DgBI9+HPyUIkWTlmw+BYvtv1v++tB7UgXCcl7C
hDw8ehX77mS6SaORKHxlaDppGtI3PyCstCmpEeJIUu9IMuJAtTxq99719Hi/91MQDGXjzv7IBntIjZGG

CEaVz+RA2w0F69gZmEUQrCGn4qEdoUEzlJHljFzns4IfIF15kw4V0l7+Gv4NJBLN7oNg8eGHisgvXZXY

KI9P2D49MIGPYoEgG197i7tS1korj+eYT2arO1zySy
Zusi2lPXkfgs/DgyOsGIh/mB9sD63/nwOqtfo3ZbD581nWo98uW/7rK/G5I5xZTQvroGogwJnawZQG1N

z2qyTDQ3eS2j2mKF/4G2++VfvmHTulk+9wX+2rf1J5K4XIbk3qso6FLN1Q3gYSdeMJu/lamMhAbDw2wi

YZvh+WRqcXSe73kydd44vrh7UxQWvrG43chNdHCGDK
rC9BAO4W8yXmW6uuLnP9/hMN0c4ucD482HmSzmDCKhIToAapeelLbncF5/MvlBRrzS+zeaTMvb7es6rj

6COfWT7UlcHr6hF1yUlxjtO5tpaGJNqZCws9kW31KN1BvnwMt4ZSDIDkmX73zXISszvpxvD1A5PVfJx7

duYF9kSmAkDM0rTDXhnaXBS1Vn/e04ebtGuOEGJSSK
o0Zfux9w+dXUupfAvGw/Ib83NnWdb7b8T2NMw+H241PNuc7/kpibtTmA37A3ysVhfbNxzwmNMBaxx2zG

/ZVTG2ps6tLR/W8csjyq/xoSBoHa3FEqx6do7zx9BfeEhlcdm9F7w+1LHnawXk+Q1sd66JEU9dRyVWJN

pFg5+kuKwtorTgfKN3rmprTLpuqzcxq1fMTWmKvs7h
mUUSvvDepyi3x/e76KHd0TpDv9MWgt7J6a7zXxYihDHJ5b67ioSv7vtbLVdoQ95a1ZiJiNwBQmMqgyZu

reRG+mQnqIO42hutvnrptYDDQ8ogoQi9d8P3GsaExxBfq4MIs/bGiGpNfsOT/D+S2GkYmt2opy6dzddl

JLfOs4jlIzQtZ24SVEE/TnjwZ6TRXNA3NT6koSZkBG
dikzvytXSyPPyrgVEVEGF4A8488jbRMELG5RNnAwm+37iJKrrUuMg+atuXa45OBfO6SnEkMZhpv2Ro7G

0AYygb0f274+vnXfm0VYqHBF19HW+WmqcR1NilI/YOOLNN3IfwO1ZyDnB5QQNaCyxHy02fktT5NPdc3J

UKq0dWtd/KubZUnGbIvBUXkOrOsMH/Fap05Lm/aytc
51JIwaiyEp1HsnrgaiEyCin4xsu60AxnBc/ctmzPEyy7Hr7Mer2Iium0tsRkQBnWfg1Bm4m33hQD/tWx

uj3TOZPcJVfQG/siTQ7Nkzddq/iei24rSti7cgUkMlZYg0pS+0MG7x+GHiyk8pAEbN+OKERoKVVXSrW2

vk52z4J4mRo4QTaLFpw5KhgtJDHYgJlVcvry28eNP8
jEwEJFpy8NF1VWBgVf4I4QD5kwhm7/fUOdIvJHVubw5c8uE242fvb5CkfFC8aOtLZB8mC+ogV+dnAirv

ojnRbBC4xJadZX+833g3SPj0b81LfqIx2hLs2euxUw79w+OO04TKyKaL01CfWt4zuOHKJzcvXb8YJr4a

42jp1FMwRlVSu0djmQ/46H25eApwsDSOCXFT+Iqslg
eeiyDEQtyNlAll38Un6c7ISOFqkEFAHJ0mpJSM8OFnema+uSrWPBD8ZsDi3l0AsDG3hXa++AymjsY4Oc

q2oKPz7D6yMcpsGGAzMIpqss15E2uYIp9/RQvDg17mYQ+9dn/S5NHF3vJUGih7UTrIa/ag/eNqSxHFBY

Bav4NsukVOA7twK3Wh6c1FgoFdbZjOvwThJsEwcfPP
ix6ay8bujv1M4UDccg2Fk1UkDGlC1GY1ws3CnXeHqIdh5cV8XFTmz+i7bFV1jTQkSizrM5ebPWG9kSdZ

8a8ED5lHVNytTyxryKXA+WSJkodJFlre7UZv7QJ40E5/rTvpKyEQok7wz9pQap1SNj2/FuwNcHzuOcym

Brady/iBhCQm98JwB0HaBXLwAAj+hdn1cOwVHMnbkPiw
Zn1s/rZaZkEtO8VlTWFxc6fu/UydVZQmQiNl6jPTTKt+OewNZMoBJHpovtqcky+fwikeHp5DuHXwGvWx

25Z6jHgw8eyR/mLHsh3ixtzHfXacecbcn5oQGykfPo0cWC/BTEjr7frhdMJA0pso4xS8Kkh3enBVYNX1

3Mu8VxdVYj+gZnFzqNvnkmUC+Qdlvljca8Chs1WfY4
03XY6xC4RMjDZUlOOOaFlOR/A2KprZdzlW06gYU0MI6dfzxzqkDf89VSQ8/TGk04G8e0KnZ3KC6Umr8e

xoGxMR/dKau8XJ2oTnufbmzMiQTJmy4sGFUWysfylz2DDUoQeoDMayGhd93Usc6J9m3g6Zp9XtlH6PXV

Jx19iYfIIkrrdm3mOGGmvX0hUWjGNPQejU0+VpFusS
cKUF3ic5RJHd6U/2w2X+Moore+Jy33zd1WXW+PWThXRCy4rFtZOcKXcMm/ozNAjkZHvuNvI8VT3KPJgIs19

ZmMZpfK4P+hssZcLuzOSbdhlL+7ALaIXd7XvNxy7h4/wgjAfWizneRjHfy8rlsAHYB/xaQPqVsxKBeYy

WOkMupPJQKiXKGdWVNYGaVCAKaC/BRcU5IKBaR7Euf
oIlisF/aK1Jiz2R1xLwh2zME7Nmli0nlcABY9XJyg15OaFBFruXyz9byVQDN7eBoB6uzdHearDq3vpYX

ydzKCMhDg6YdXUAY4RX1eHerCUmZ3wJk6JR15/jAuensp3F+rDB3/WpZpFzgAMZFupHomxtINgsc3Geb

GBgKg7gLGQhUSyS0tqYeakIioaBmQy2M8gbum1mfuH
Xri+dYp+LPWa2/QcbNKBHIVxzCN4wiGqN6aAXEW1+q/KrbUG20EdHv6++6gxZkMoHgI738Ggv5ki16Gy

fNA2xY2QMPZkJJEZwPdr8RVILMnlV+9TMwFcn8zQ8OuZkMM5IZI3YesugA3wnWMfeT+DoFLn7FJMx6kF

8rgccVD520sHvYrsj4+gGFDjc2z3MFC6rE79GS+vMR
N8ys3i7ldo69q1O9CQkXCUUeSnFKfU9CPajIhRXwMsoQb7ut7K5j5V6hzOqcsBnXc3TcdCeYg5QTQffm

dit3sJ+JV6qm6LOUvII1GBgMR63B28AH9Ns0B4Y+wMzG4ID4glKcv8/mmcfKx5zBux12ZLr4UG4qD9Ck

DhwSOc73J5mcD+eUmE5+D6nmTaz6RKtF1DLRjqP1FP
x2IcD3gUsf6y/7/Wzfni82DxM/ls9vY8KJYWht3r5S+BuSTtdMmyjWXKpzWFKDahhRikrbD7zZJ6LB0q

sj+uBTZwokN2Qg/NpPb6US+cpmWvMLCrxa4r7wKzS+QV4HXnKcYVKCr+iNAO5GWkqQlrRZEgsNtlo+N2

ocV6GjOOGtDelw1lDVCbB7yEKP94Zjnf4GFggNpRyw
IzHhfoE23yakvrXiSrckrfzEtmeIcZUDo93TeMTbfsJ9ZbROCexpkOEfPrrkJdVqTQvmHXVX1VyP/UPx

k3V0VxR/Wohh5KQRZmAaPUgmLU2ZG7ziSPaIKGRaVuMxUK4kJLclmtnopVMU0BspWEmZYKphnUe56uzC

Js//lI05+HdbrrbiDvaW7pww4HG4CYtJAb8KE68v9u
iGJWe0oCBc4irzN7DF0nywNtayVGc3q3OptzVuTb1efAl5h0fpFa0Zm2x9JpmItZp3F7PyxWKPC6tsI0

vW0E05jRaJLPkVVmZX4LxHcDeLAm2oQFWuvq0vsQ4MCzuAE8s9q3huuTQSr3FFdjbb77oTXPm5XIFfK9

PrX3JAJI/dtLD8FRWQV2T49yRfNGc0dRKWXwrFww5X
Ok3rsulrDTJZ1K+IAziv+/rBl7WY40wIGB9No3hRaS1WOOBJDySBOMY4XODGyxbInTg/1HwUfzjOou6z

9mBvckSjNV2n3pQBWjsGXFbcrwpu5oOZfa2G4cXK7zODjmRbSbEljo3mWHFUF2dkgH5FIY5FZhsPBcr3

4+1abC+L2GBPwIIA+XyJlHahEPBUqIvWK4567md48n
X2rhvR+uBVcLvkYrlJYteooik7dmrOv0QfvSTagXC/8Z9jMB75FKTq+s/vZt16cqyuFS3OLY4ccg1p1p

rHIe87ElDjrFTNdYl9P3HjAAMwLCl+vZ0h9gTnTgTtNLapNPjdtoKQLhx79blMYxa8GwycWFdaDTOt/+

56K/fqO+bPb73gYxhKPryj3A7R8yqshkzgbKbNjImR
+nbvL+TISF0XTsFupKshbOCp6xE/kutl4yNewUE4pk07tHvW8yJ1mxzmPHTm4cOViHPgBimzeYdGy8+6

xvRe0GXmeu1evGuSww8BwOxKeQt3bmo+crLNAckxADNazAwg+mm4r3DHMWzwg+heQhvTupo8wihtH5R0

c8lL/cLDayZEwtR9kqH7T+aaIZ2tOjJ4RPHFHfs4Py
PiH/Dfiaqfdo508G+7aiw7WvG+V6jYzRaFdCWNzEOAy59iydFEf1Wq1GuXWDb4TvJJNd/owgmP9NEeUS

j4fcjpu+G5h9OjdUmjRpurFER0KuXvKhd3P1RDer8pEbKL3h9kHbrmq2en3djUIeyIJjg3l4uT1xd1Pd

HogSyYZl0khR8copdxmz7z4KaVH5oI5n3xlkDXC6zS
6/+a0Wh8IHfR6DcN96njx162waBv2gDuer9DGyNvqxneLMoMKQBdI8RMRpMnICMGtPZpY/2vejFd5tu4

q8hR9ZMGC0usq68He6hiu+vHbg9xdUlHzTh8C305pGE0la70eAh/PJohW0c/PZCGzVLR29OuZDGGgzGK

wpzQJJ7XEKCBR3NW7OvyhG8WWKW/boDNjvDQe3f+f9
bLG+6He4Vd95tHPMAsBJ+lG046bQSw94A+pbG0d4WaYkS9P2kaVQUxG72PkjGKZhC8FLbKa/v0z6tBJB

UoIma8O7yM1HtjJYn5giec3rLE+LM1sSWYR9+KWdrYYD0WrQuaIi4LnGxtvnYpJwC0vbp0lpekBn5CyX

WANsiWODcwn5+xjrVkkHbJgct5Db6wnEIWHLKJ6/Mf
3DIDNOj2NJIu25VkNSZ96GWH0eEV7fI+6US1rTf8cED9u4z188+bFOEkH+duRRERluMtzyAtmE28j81J

JuljPlnezUhNfB/Jenny/g5tcHZ+BbuGZp4oMMGXbE1iXMC2bHKjhFA/odzgaPqAx/Uq9Fsj4YUB9/hi0T

a8KJvL2KOi9ShQY2FdaMtprzln/6izT9ikwxKYwx5o
GJ+60KvV8Q2qPvh/BxDOI5POTXChiQxcZr/xcOldCrfhlzb9jJbmVsrg3+tXBE4H06O+8Gqw4YirhYKn

B0sUbG3S+/IGjWmNSvSLXeERJkf08zX59uG35uSE2wjC1z6vs0z/1Ve1Raddr+fztGjG9dTUu9Tioep4

/bFUE0aSJCeet3dN6HscH19gtL+skHbrq5CdS3YDPh
zxGwM582/OgrPg1mhY1BxJCIOIHsIyq8SYvgDllWBxnlS4vHD/uwO8HxXLEArCirGopbrKWdifHRkHM7

MGNRL9BWk5aJ899XT3iDIHvAcmdDdfjMQSKX0am9YhY+nocIMRgMrAswCnXPutf4/pZpRfkqgF1oFPrb

144KSGjB0ZdftkGr1Y6cHJ0gXdFQshF+Vi1y8vcKct
5m3s9iUQsYnrBQGqTfmmI5rxSnCyeFffgNgma+4z4/McOsnkrcupG76Dk5CppROUHLWGabp2paYU93AC

Qrer+5jyeioLgEhz9sLGC9f+kd1hSyIfYi2Km/Zy2x0cEtab2889JjZ6FTDyCfeXnaL6raeSKgDjHglT

0+rcl21CaeGHCs4cMt6jg890RYd1c7kISTfT0MMuBX
sGKe//FTM5OpxomX4bXYu5J2ttUeZ0XbV7bylY+a6OMIMvuW8HjWMGydiDswzpud0/Sqs7tx01vLQprC

yOsoVyRZzt0OqXuXPOrI2P/q2SHR+pFj3/EZRPDXFSVDSJ32t7ThVUDGBCWh9eCDQ9zwGbyjl8zPBgdi

yxZhnGvQeoInMYTEq0B5gwpZiPL8eg4X1apPO24io+
/rJ3qOyV7QgY4Alb0Jq8l27LxggJ8IxkE2c/mCPYnfwJVgfFwXqth9u8I2hn0DnNZgMUBA9SnV5hkzA7

XcJcAbGebuEZKEKba20PkMz945aHZ8zv/rt/ZFbKhab6pSg/IzFIsp1hUYe5Iq5BHCjnhgcUXTTq+DZa

wZaqqrfuY5Q6BP9Xni9Dz/sYK/UUa2LGB/HpKRj5d1
chje6NE9duR6FZTlUhcA5ALG1uPinOh57cQYgroVOjJydcCMnIP0W84rc3rUCuwFOTIHYFLjd7jUUMJY

fwBQoV/PujTDZ1qV3uARSZk9m7TmyB+n4FzSoy1rjnOrTBRRKVg1Ri3bFMDpb5HbJ5Z3U4TdrTzEhlvL

TWKVOXIQOaJ5tw93mJjC3C7Xcmv7+E8OZrx0lqcmS5
sBbVvObFskPNJ7XvBK5Txe0rqchwGnOyNr4LNo61pJkGAgrHA+HmWeQNhx22P5mXb5jndRbHC4/AEQY2

OyGgoVQFq9r1ACDNjuTzTtOyj5JWUNH4RvWOFDju16sL8UO3xKjXTHzXCw9GEhwcOT0AZM8+Gtx1n6Pt

7/HHAUbL+xGJ/CAdiwSBtccIRKtb5g0kbM5VgwVrcR
vtHP+KUXjtcN/InSTqgNp21nGCsdWCBB9YWcon+4XQXLHYV8M2J8vFJtx3+KDSzA8OIufv/lyPmXe26q

LWNJkoiXP35JYQj51MTBkiyU51AkawUG6/Av2YvEqRL5/Lv0NvqrX+S5UxvyrHWXzLEu1P28uZ/Yr8MF

BRZtxZXTsQpurkfR3E/8sFer+EccD0KQaCgsuoG5GJ
yu9pxufnq7R+Ho69sFj1xUQ3TWCyIluzzI68EkN6beTXChzzOfGx7/CeZ69M1viF2nDP3jE/Z12YhtIW

26Rij5fkUtBEH+AETd01AyOZHXmVGXxr+5q9g/Sz0gRYVDJyZR1/8soY5Z0CjxODe0ACmDLdHEF0b7J0

5w07wG3I+s8nCDRoDCNizeDoKxPoCSFoSAbK2NMLr3
gVPRBSWK+W1CqXCr26OyIybVsXIMfRbi9XPekDdgvcZYEEIruJ5QOeWAiiNNBs9nOmKjP41nKMiOB7cL

bCqyuVxdtFamRy7ZIAUz9z1zgIrD2GKZ/j+Mv6lbIXz1o99dkv3FB3R+niGf3jUTKsSrmu1P4nCKWAkr

ZR6HRvfpFCiD9/f8v2fOZgtEqovvzlqs9/xHipM8bC
40B3dCKdmoGJaEDRMCR6HA6t6rgOx1hci5WJSwaEB/7VrJscynD6sZ+/1Kr/qRXM0304hHChDnZaRE+Z

rDMjVZD0J1CEoKZ9ZFBMKQXfs1160KBC85LNAjQ7X9LXe+AdRuxFstY8A9vplmGx69zzXfnPNm9u7h8L

iwY5Vh2xxyLg5RTZ/oQsU8cGNBk0CRO92q8n31k45e
tX5dxsi7AsKcehBhMvp+et+fB9k/61DGlnl/y3l8/yZCS7tZvH1d/UJgpybw+405ZqrN5DO/vdl7tUfA

DUUOwqG6QNR21lYKY1X/xM5NN7qBy/QeFefnQ/E1p4BIsntCrP/y79Ij4NW6vuffymMNzh9NxiB0wvoO

Ki2XgPsf3tT+tJeD5X249Z4PNtrMjbnry7sbQX4Ud9
0qvyyjY1/KlNac20yLWQEE7tg2YkqGBeF8ceOhh/wBwgutarpNYx5WZSNSdWyZse6wq5A+sROf27uplm

cV+gAjfAFCiQ50YpWjGcMghBH3KdCe0/sgk+IZ1c+FwDtKtyfOEOhWxO6zxR9ddRTz0gV9yB7qSrqG0y

3tAILtnqqhc+DD0clt0y1SkKbn20PYco+lYCNL5w+P
MG2blM+oOVhJIQ/p/LrgzYUrk6/cbywO21Q/d4aqA7YUTHQdvZxGMxWWljvdpAl6Hd5U+W41OxSMoGr8

oSaCZCkwP8/TTY1NJhRSMV9r/gzC4Pct03Kjs1Yswqb9ybhIPiQE2uvExyFzIBFtIG9JUih3Pup2rIpV

FnV75sInu9yadffUS6pU5onFm8CE8rt4gSAfJ474n2
7CMppoImwYn8o8HwgN3YZ1xbKU03Xm/kjX0MFw0JLdz5kNRWXlNNNgA8UbfoKeC/E/3/uyoOWxwqHM8R

8RE8Z6kw/RNv11SKj2smXXbLXxfUp1lqfj88LTOnbxLxV8pR+HMjzTBqr+kLoBX9s7Akwd+2g30lc5/n

yNKNy8xXZR9dv0DwrYvUzxzZ2r/Ag5aO+ue+bcjd3V
/UTqnvWg3HAAscwlg0bnGAQqpLspEI00jjhD5EzfTXV4tpPo922s2G6rkoNe1XZUAgx0goh3VZck6eWj

VGHsZgG/ohXhfYZYHj56+D22OrFxSJQ89TZdgoAXOgX+mdghK/G1A7lTseWLcQScmyNljCp4I1SrRxVT

YBZ13PAA/Zz8FRBXIVuTF1PAHdoBwUou2hm+M6nwE5
bI8ce7d5pf+/uwF0ewLxp31hDxfjm1Cb2In59GrwtfFokE5g/Xw0vh+cHUV2OnOHrL9eDBe7eolplUO9

6kJdmtt+ylIYyig0UetRZdr5NJhqf7Y9cV3HSVWb7VRVvWnEAzqSpClDoFA2lLhPQ7yYEcsyab9tLwZW

f5EHFZYzYoHHxGMRu7HWN0y5pzEAPkgQAPvDHWvCAI
X/x//GaYWF3XEVW2OyhrSAoq1eukW4mzmg2wt6Cgfo2ePDIhaCrQ08wtYMUTpZ2TxOMu/7tbPwBj5P1P

+a9Gm+6vkvXz6334/OQIWLX5YAGXJvMTegMvCtvXxpzO8NFk4t5WSHpxHCResS2abL1F7nmUAYaVronq

gJeVQ/3X14Uc3gq4XS0b6/aFh/2RCYRK22mgxtRBYH
IvHCF2EZzK7RW7yPlw5xgoixMQJNhstHemb1syVnCcbfvg0RP3K2Y7PU7ZzcvV8+bQ37P1UA2sFSK3oE

5VDBI2ThID89RA9n+LNw8uoJq9JTa6dwAs+eYF7r0tIG8Tbu+AWlzWQvGfViaiPXOvoP27ILNDUOuolc

lv7NxCSzqYPU4UuftLnGclclwlQiPbi5gtN2/Amo0K
Zw3bcek6cam1ycpN+CTZYI0bv3LmMAjnJxE9qxSj0jQpyHYu5OZVKDI0PAEK/jmzE9k6oat0b5FQH2si

3ZFLbUJ1B3yRQVGfu4NX9LrsJMe1JizrWNfvO/6WB2TZ69HzHdXPXeDnWmHUg+9a5FQIqcCxjmQv2u9P

BB6t4pmPYgwz9Qe9ThBLu/QB7Xt8tpOd8SnJJoMAz3
HXjRFPvUlYP5l3ZMdr7fo8jmHAH+TU0o58OU/B/ZV15F708dNI4+02Y8CQIecYdNKa1JfSU8v8BQKVZu

yAabqVlcr6I16O+jEIurl/zBrB+uA0/fg/tAqyiHHMIY9ypl5e19FFS3lq4bhIertxgNQiarqzOae4yr

gnoxJoHim792SIeJBfu9I+woY70wn3jWX0+wAwR/o2
U4sNTwn0xKjwvHN+Uzx7GRXHoUcbjqR6Ow45v96jv3I5I3c0+ViRHpMsaw45X69xgBhX3dDcCHV6AEVl

gMr2J6rVEyJaJYXxfpNqAHkdZ4IP1XH7XD7uB6G2GJqBw7oTkddLi0EIhgkf8rYnlH74/tGtcDUJURZw

yRnX9gauLm0VLZNtYoc2KGyIotiO2xzGPzQNYhyAk7
ZB0aooCTtZlBJVxAgemvY2pM1c8wD5m0KsdBaLjOpkB2NsrxdLKAt29cKyTyCUG0wT5pCw92GyQ2SfNS

YSQ58eDA87GuiOBBw7whdhZb4W8qXE6lhJrYbiHLwRD7QfKGwNgsK65QV7qnmH8UhU1gsR0U0DOPPmae

fG4Lz2iIYYP8arwjAbGU3n9/3eYZoEPTKa18XqLfac
F/233V0A92dUwSd51vzB6L411llldyePzFnwH7uphpfYZN0lame9CklXV1m8tSE+clHfrRGGDV+NB8YP

26WOTMqwVjhxyaaWJm1lmewdCwG8HHAtKh8HBc+5e7UFEjsJmSL1DQ5LdptGQNOZgkKM/eqXM1Ps11Jg

xtxvEehKv00REYB5FlmHyD3IbXNQ+LqfP6IY79RVtL
p+8B48fU39EGjjqTDazNcgJAO1xu/ZuBBp/eF5bADGMjmHsnaDaEm0pXiFZITmgUPX3yG1ElqUMXJ0az

a/nHRcBuwsM0vOKCYPNmREangspYOi7O0adPK4H7+7PqTP5FnEVmgm+w0BXuCyiLLACdMvjOXZ2kLUpc

qtMAN/3ZNq4SwqmGGkAOi0ND9AK+5m/rW4Kl7M/n93
xG5ESuGkA/JM9H908JE7CZ6bKK9T6+0MzHR8llJ8ORJh9CF/MK6HuZr10EQgfixvffxjVzqAMbmcfmy0

1GUlLRjjlJwZ07cnMqNMIzkiiLsTv+VQeoHmF79RH79rvZltkznc238Ytp/UpBSetFd6qqdmgfN9suWo

jCivQWCoE8Ddunww2chlxjKUp9V3wY9Tra/3Ztb8X/
RugljhO4UPndCphLFHirRFf716/W/5fXESHxSC8YNcN+etXxS6PxsR0NOTs9rJ9pX9Ppu3Q++NJQM7bq

6SIKI15O/0kSsU805P0CveYzNlvR/t8Wr/gwLIeUlZqCBuBKddjMsGur7MyXqs8UH+SP2MggNFKnuSk+

5ygzsN80ANg+YE1ZnIX42iNutC44+mWcOGxnbnAie4
EwgqStyTCXIISpbaBD9UHIHu85Bk3Z9UYbrCYqmNLFEc3fmt30n49J9ba823Ttn/ie4rEnHn23s0bC2f

aRdPPSC668ErK/RhS//CpAyw63jbYHnFSog5q745xLpvNpESzsGACrpGVXvPpT+EVd5YZCDGMWAoaOqk

bjQ4xgfSUBT9W2e9MzgdPQQeNsUAi9sS+fDLYyriaD
y9fJYvQ3FYGeKpdrfiz9ZsuWqX1t95c3kTv2R3mXfUpkNtADQZ4qtuEucmmN+MfTqLGE5s7Uw+j4nJgD

EETopQeL5hL2bj9jui58MEesgrP3gpH6QonncFLJSYb2guCHqUUrGb0ZhpNVuMxnue4SI2Av32tyBAA0

soUNof6+/q9iv1ol9KKojxyZZMSR8DWwA8qClehC2p
I+z8lVP8mLWlo/h4tVWaJ4xwuRAZqcB3DLU9zo9xdA4xBQqvnjDVx6rm8hsj2Lw3LWpz+H5mGGIYrAmv

oUyJ3r4yJdogVT1KdC0BSwl43+EcSsCpp6Bhwc/wXxQUzAqUwvDQ7QpLQIvp3Q7eMKJh3t1VWmpB7crh

gvx33jQLz3JoaDgUaCSGFSq5h1S8nhn6/aLImtStLW
8U60ngj2BbcGuXM6cTjEgVTzylGTuRSUYOL5j5U/wAyi0qdV4kuBImN2h6MXmnb1D16gXqWQ77xOpAF3

Bg/c56PsRjdLMJ/Qj5c+eTZI3hpTbXVtTx++XkikSCb8Sxlkmtr27m0TaZ54qefScgyEb3pNQwWnZxTy

KllfKXlsiLs0aAahVgvi7TM7Pe8iCYRMHSifhG2jP9
KVY6q/Sm7kRE0eEeacgVNwUeZwF8KFQCyDeM4lnu3uLVyOHb5C0ZTH/KpA/qt1neQlJo5RzG1lQCVvaE

V57g1v+l9RBZATdfs/o7wee6FclQD3Zebh5X0GE6SfIM4QtEmSSE+55m5xN+XORTkp65GbzuRbdY9GRa

t8fDDcFPaJEgvPiujdrLoM8q7JiXTwe4S94M21IXc6
dKitHJx248eI6OFv6BlsET+hkxCjfu56+7L63zOuSfp/6D1DvcsJzGxv1tz/8jsm1nXWL5B4JNj7sOsP

I/ZJ5JVO+Jq0ZJrpB5ZoqenaXsT9cbrJh13rbfuGXG2lDbSSt+lcKaia8b5oaACZDwJvnI2v8AUHU7PN

Ty1PgbUmdG4KNMA9rMdtS1yneesz66FOb8NOTt24I2
tiBMukAU85Skl/WUIaEel+kakfm5qFiwjUXAdmKOb6dO2OE93OsP3nh2aUP3rZJ4nRyckF+7XOUT8gQJ

af6ayTrVg7lzpbIEYMrNYhoU67dE7yYYVLlSse/rSIbln7HDHuH8QoAYh1y5FMpVG9auk+mvB1TCVHml

4T2V9ssbB9i7MXQrk7tMwgd1djx5GWTdeYbBURaMT3
NgPh0U0WvQDghpo6VpaEGdNd6LivcUdKPK/iP5PFEWynrD43cnGvlOgG4ArrRw72N7srB7XjIbMHhIvl

LAW4M9iodb/qwQ6sMGCwCj8oaDcQRS8quMDECP/zwpiAzvvG0uyxwDKK/4HArHfeLUr9FgGs1bzYK/PW

29JhLpy2ZcKQixSJJIs5Q3uQ5tvjbSPhrruQ0rl+6p
QASZdLNvoVt1XvKs74H8KXXaCdPREgHK4VrAXmfScH/2XXBJHxALnF591HzsO4A7dOxLy/W0qfvs2JRu

PBM4WbTJoQ7BcsA2KWfcqVnCtz5QNmk2wSeKva8RYpw4IB+2009cYKmiU2zUgOX6pEIRD0RU9QfHCK/J

WFZ3g20yvGyEFuZ1ocFqntZBqnIYotyFMqfeKRFJ3y
mr7Ueitqq7QFQ4cmuZPnXix9U7zGE385LWoDmFhoR4u1cZksdWv1WOB9JwaGfXeUB8JXe7R6YUwBv+6G

l8QcQEfPm2G7HddWc+amQ7mVkpUag3RZccqlqYUiAMUxPFN3eZPvOLxgisuN8JW2OOuSDSX9SQ7iTy/D

RTbU03B7i1vkNI0yPkBp1956UwEM4sf22AA8xkkvwt
5wUUPuEiOb0xoSpybeiwoJmJsn+YmMev21EpkpsYWbrFgtd2gv7VcLaHIHkFqJQPW+/HPRepyz3d4Njc

xJv8/K8Tj5iMWl6dSejklZ7Zk5FKLFLJgn0ptr+sm/dA9lV48hYp0+UfJMi40FNJ1kOcw0L3CQpp9WGM

ENfH5KAwluFgC6aQhRkvHvgWGV7fUopwJIjrJk2IeF
ESI4oXuOaFj7d9Fcspelg3TJJojbxysB5+quQIikSS0+znELzWjkvTKocnBFj2gbYITfH2s2OcbtIhl8

KLLMTAsS33ZkKg4k8CPRnuZQxApS0PntdI0hK/aqDuWthpmlQwUMDLVreWTQoyAZnkfp6Hkwxl7ISKoP

l7PAGJSYK2inrxMzqFmn/Y0MQtH0ja+waYl/O1xZ3N
GhbevAENZj3nQxBJep9jPwSp0vyM9zfnKQC8pWPVNnc+UTqxrRo7lI15qmzRzaLeuwuTbA6BCyefJK0D

20k4QUK3xJghpo4F7hPEGpg58i3Z3+zwyVyg9Q34yFj3lfbHRQS3wSkCwxglxwrgobSK9ublrsQtJw7n

ncaXBsGXaU3kxu5KXtfrW5eksm2yHxc5+zjU2IeYIZ
vx5WT5uuBPY6Q8M3SM7eexeukAqTdVpFHmscpDAdwBQmRk3lw5PwJ5WdZwgjfmH8GV3Lr5MEAtkKE/vv

nXOBJrKXwmo6j+9jIL/dy3iP1jKoUVSMS3nsjwGybRxwBxw1OP6HW+R6oCBR94YaQytRO069N5QWpohM

XvQg8AeZ4ctZm9CVM0KuM6E+P1z+jJka5Vg9fktccM
vC1+OcIDAgE6kgs+jN7j7rnFv6neRjJ8T41rsdlToM9We4RcPYeHHW3d2nNI+kO7yMvaxhI0NX+SrUDm

bHcKGah8MwJqFB5D1kOMqS4mw750vwLl5tQ4dG6QWwJFREqmT5zaH3K+WANbJ58hnyU+3ZlLQgsTkHFA

E4IhgPEyPej0/ueU7hWY8E4l90nHrj0ukyh/cpVH34
A2J08P7xiTdmj7kU+u4GClgQGiKQPn0eWOc+GFhpf5wYqDcMQII1HeL4nyGGeUfSlB7A1tOI6Ltozdir

jcB6WdpiAmFq8TiqRU541GHe4SlRtweBV3MH/XJsAF0pe2jvl5819U5vi98MJDhUqz0Ac7NET30YSjx5

9Jt+JPWsQ9HVf/ak01ieygfjhZI/wiGufruWGiEqoW
oOMiMnqmg8saHQVA8SnFmC4SbhEC9yuL1Xzo16JEykfcdZrwj8WQakXce/bY4rARP+L/1LrXIHJFWtoz

FqJ/SyIfmdbPpvLC96uoiMGOQtUp9oroeHS2BY0YXOBRQX0j9M1nJ2gKPUkqeBE1zZUaJtbTW5jBGGyr

PX3rmUI19LM5JWEkfec4k+wBKQwqGPJATE851eS9gf
Kc1g1tJV/wkP15rEiXXzCmA9m/rKns5KqP6CcjrtbPQ5Tx0xDVq7u7KEFSn/HZvEbJVncNFTZP+ZwRf/

8xkdR2keCxfOj1veQk75UmLXoZDdb8vy6fkV1bGQFx0+7HTUAjDPeHZVSTfX0qMIu3ZGesj+gSkKFBQA

VpipEkQIyymz5tFLyayHIV//ec9+QO46mc47py281y
fn7XfV/E8klzQ5XRHZhkb2gKvHXvkBl8ARCQkhgWSuX9xtWVVHqPEiXAXHxLs8VAGezuj2MbIWjK1zrX

fmIpk+sPX02nhvEOoltcRZFwp5y4ltguqTrZ1d4RN7ZFBEygbmxynBp31lgbZkN+TvO8fOGbhepa/D2u

JL2BEjUu/AybMLeLcZKB3L188FuA+LAqhm2/l0RGmU
icGVcIpymh66OcxHtxol5ThjVqSFtpBg5U3gJ/gVWqWnmqmwK3Cc9H2IophvQZwjB17zvqxygU2/SQPa

AQPGFbGlkzzusi0Rx/Xtqkfj/JVzIKmea6Iy0FONDzYDfqqBHDsY+6sKCK8g9zVvT+G5UILKBRnuV/A1

eg32ZwNjzcNC35fpO4+TKsGc4+EwKmfoF2fLpkBmAe
AEgZ6JMNn7+QQ2d7AsN2VastXPz0EbccdFEVoUPO3MFchW4s0R/dfcd8wIjpwR3FzOJdhIG7XTKgasx6

Nj5AcLEdLUHCYreVt4mnvTV7puovIVMqHmHE/BscfVH+b8aUwuE9fFJ1gtZWr2uL6pS8JqgoDwDuO2qc

RSrA7mqEuJzpExrAMxqrQ/BbP2RQZZ6WaxbrfuYfA9
Bkt3u6F/x02lk1YfOhebhumLcuhMqauAs2KX4Tv5d9Ba5VCgj45eXVAryf74RExWxL5p9Kvj1IPO0GnL

UN6cd+y/q1qa3kmt9/2/TeZQ59Ygcjk0bfm44SmIKiM3Yxi+vSeiP8pAvtSHekEcd9NbufmZzeaMoicu

lS8b6BwSaaMUD8FKUZ08gMlls8Df7RYSAGb4JXYzZv
OXk122/y1kEEI2ckVsMGWZP7enWRnfVL2wN6W7QGQE+UE3Y+qDRZq4tram7gPdRLo+wS3g1eBSIhrbVa

YSN80/5r7fstN/fr9v8fZBonBtwLP+GMXPz2/oEGau2JF5eQzK3LY7qLl1tNAXok5dpYo9yvvKigsjrx

PKp2Bk26ovzPDrJbx35br5AVFuVt85/3yZ9o155vaA
i/3K8DfJVuQ1OqnrRTxmzthj9ptpeddulpgXr3WiGMdxXAou9D/JE7xyb52PSIbKvPaglZv37j8F1M1B

RrD3mUInZHEhABSN3HvwBBBpoiwc9CKRoOHu3sxRHeCv34Z2dZreEfwYsYsLQ45BQac40qF0XVBwQcH3

r42X8Fe3Mq8eDnbeFTu3JTAaBQ+uFhFqfR+dp3L7KH
XJo5j2kuOXH5KxsKIao4niUK0+4S9GRFkA8CW/5kZA8o5kQgg59RDrxnm3qRimg0wDPO8P3X3TkDmIKf

K+6Nim43W9k8UBs0pf71DxyZHh9of6M7NEqL2LOqYgaVw3FqiTmxk1pyujIxHkPcH8NuNPA31rGorRpz

08SkpD+gSG19BG6DLDdyPamT5bRt72RoM3mrY6YN75
rL3nhnr1gAXwtGSVCqhLih5YWKSu8mRynqvjt+fE6g1/E6tLwT+GHAeWDp+7kLM2jFRko3WI/OQ5Ty6E

8xND5tiPk2eJoHycO2TRyBgy4NPSB00NM/YBXvcFDq0jX8wNAskbX+7TDL4iZqhYtPuSkBnPWUfSfo5A

RcAkP935EZxKYU/lWtS1ovGA9Ns8vzHlXjIl6w9BUW
RDkVJUP8Y8EiFCpGPaM7c8V87p7XPW5pxthhFJ0aWmP32/+7AqH8Zm0xrBTGri0hqELt9CNFt77+a5tW

QLjVRZl401za8UP7p4LmXcyEcjszQQeYZGuzbCtHrVPreA130p9YWFtfMpWY5q8Pbvmcc4XXPbblF/HD

NxAjfNUoQP2dFtX8irrXkFr8dRUUCSTNUf3JziW80l
un8xrftmyf3HZ101y0UaK3TTqsKlhi54fpQMAtDwnMeLlWddH2cWN7e0/hTUmvp+pZQ2NeclJZw4zvIY

sYp14+oE/L4gwoHQ+8RPU24z3GCyMUNhpiMu3JqJhKuzY7vadVCXI/F5PtCZ8PcYzmM1OE4EW6anSnt/

amvLCgcalehpXZs+4Zxl8eTZXRtva0WR2agaBImz0B
d0iiz44kgSlF4Yxg0KD0Z6TxXm9MgfL6t3XllITGY9c5Jhs3FvKoSNtVavr8OCQTXgNJLxaVHvjnSdtz

wHBqZeAasBS8HTfJX+lk9OSzVOQI/Jhernbom63gskU1x8IvmJ93qT1aR5lEQFzVfhEeAaI+BeYqai67

hEcDEIFWuVNw6nukx2+aL75yyGFiSsAyUlS9LWjhZn
ceKqj2faWqFWtjUyyEyqH0QPk+2OYcv+P8Vmwqmx4uDzcH5iS7Cb+6oiitz8twy8lEWx3XVZBVyju11M

btUfyFL/gjCTFRmBTBhWmG5Q1Vdq58Y4iZqH3hCN1r5RYsn9P3lAvqC+mKnOWrbXtMaDciPKmMUFoNVV

BJCxJv86gDLaNASv7T2Wn5lmWzxPgSdpl4iM6uvoB9
Qpd16TFInDhkUxXfeMFK0h0z+UJj+kKLrQiPpnLyzEXunDZdzRpHwvJZ3ugx1F3rtMfpxrx5FZ3YME5v

A6oPjIgDnvw0vC0sLf6EtH2dNiHaOQYoUWVhouXe91GUdIUFK/KvNdA0T3Ukdy4wZkupZ7YGHRAs++J9

RRZ9sdOIasYqjByeuBI3UncyMTm3Su2Or3XHohFVgm
DjfHbQOp2c/tU/Gcdq4B4uQPAd1CK/3oj/egIZWsltwq21WsffLFJRkjmu6sQrJ+Hl6bdu3etNVgmJ3S

RFvpXzYTrS5AQbpENisuPeT0bSnQF6cbfpbysU9Lmn5XNRS8WKFwbhsajsTbPIaQT6EfL6rDtDkpofEo

OICyEvSLY2L9ONU79xyS2+4I1+gFpbba33mYRWqApG
jYIjkWWm4cZaKwe59UHtdS0oNcrElpa5t0PzAvMhcAG2BsMjC9JbRc53/zrpwez1HJCt7X6pEwy8rHET

8CYkUmVMXIL2xx5NSOTPE9Dy5MD1o3IaLnKap7RdcY7e+g0P5y6Hw49abd/sMzhh9g6C/pKPHODY+ymT

7/QsrzOVxDaQWuKrQ/XCduSuY8Sh/aPcZTUHxZeGvd
L88zlJp0tjSWeMn70zF5LTuKOp5+cT4zJBvuaIVbKFiKVXcByn1tH7tGOffrHIGALD0F1xHwA6MO/hpt

CqNj7Gop1CFXh9iMORMplekgmDv+YRyEUGKT20V3S2inag2HPKiT6tdVoF7ftNhrJfxEFXRPVTR+5XOg

fkpBfspyTb6rXq0/gAoiA67ZvdSfJl0n1hTe6ZCrRw
Geptx1P+eEbS20j2j1/qmYzEsMTvoNC6DIQsB9w7eqxngIhX1krPuUnjKtm1CaroMAsdYeu1xotiiKaG

Amjt/zV66qYa9q5FtBbQsojCHtScqCqt10zljdn8K6nyM2GGif/b12aKQeudNGqErMpQ8NYWs4CFktsk

4tbSCmw0mQaywDMn8aHz3Twych09rbxiWDf1m16krp
qJLEVNtfcZoatVhs9xQhwY9Vpr4VmrtpcMlJfZrW6/UJXwzNVYIUBKDIihd4/yuwcsKyR+szwwWyMzAi

OnK96OBPnb1SxsANbB18IPoh+q18hPuw6VgUoOxuszVo5aeTQ/NJE6JYbk2D9Pw/cQMi7um8xtD+arl8

OVVgsvnBKPTKPpFXJccTfatmIqOHYy6qqlswWSw+f7
0G7r2IFk7Uhm3uFmh1yqF4+mNdjlNg5NuiNgMIi0e2TYn5Dc4Ba312E1RWUf3mDbCVcw0JMqTXvVsl92

Jose+OU4T+VkFvfpoOLL1ruGMV7SZcJIRYZYrIonGjR8MnPBB24uxr++NUpgOx2k6gsMo1EukXNtv2Vef

TgNo08CbCAPG+uHn/6H26Ph7P3K3YUUgcnX/lLw17i
+Rl5f4SF3STdyxHakEJyrKSmKSvNE4hZ4+4LY5bb94CQNnnkdQyYmpu274e8QAIpa4WDUPayT6vKVFrz

IbmGoYyOMJIBoSrRGe9RpzWhHLEdZ2wQs5hVaCxYlkVIkQce/thWQEBjw4+sfTvb7j1nwwrfgRy/h0bl

554PxGG/QYdE4GIIpIcs/Me3/n/CDuF9V9rpFx4DBe
k1gKlVWGdQNNsEMk3ed4pCljWsp8rtz1S52Uy8ugsQ76VShG26dZJVCakRtDJZZsmPBJYgJ7AKXO0krI

xrVqT3FeazjKc/Y7bef35teNHwpIs7U+NOeYWd5rNhSz0fWxFebyWcPMeNKdrwtnxYYH1kBkaO8a4JQ+

SEKFQ8C66aqmW7uoPm2slnZ8xnHx1b71hvHEqF2phW
91BFu8A12GfUl5rPOi1HW1DOhvPYtMw5I48SOSRXciJElrM6at8RMN6jnC46QMG6DmTfq0qNDDo5ycdV

GQkLp8IIgTMQu/xUAb8iguJW8CEygtLkUmkiJ+ePdWvQ6/Ut8fP3gomjgBCbITluU9zrwb7VpRRD08xg

3bgRXV0DWtefVCJ8c9zAVfsOcgvolGjy9fUGenFM44
y7DD2g0to9+hEe26G3CG8EdguqboZDwct3+KickTum3OuPL0gOXfNouTzgSXW08bhGaUWgPimpQ21X2m

nJXEi8fYF3T7uN932i6OiaZc63RirFMDgm03mDz02hNpJ1Js47HLEUM4AoshkEKf5SiaYAKHtU4QjzAY

3WpvY12BVV7qtlrC9jeTdwNyB7bPv1Q+++dbb6sPB/
78QTuAT/anlDZmKIPg+oDZdpLeKy63DtGSfVDe/HiufYTfhgyoicy5RlTIkQfjQOScFG4Xj+pYyvVM4w

gW+EbgUEfzlkiLngFUXk7XlaMDyJp+0Ey9fe2T3zbD4fyWb3ytB7c/1J5LPjnG/nJviFfQoKFJ305ikf

KRgq3N/5R3Nf+3rHa6OliM4wQsytCa/qQf3DQgCxo8
I7Atl7B2i3pmock0UEztwbMhfw93/cadez427CkMpz1DN+9n/lYWhmBD7IuOkV82PO3wRj1ptmhDh1cn

faVGgEvWVVEIfZzlaijFS4cpB3WklkPVueetuomAFK3ijuDrqINp/L10HqAPdGhe+pPxpIPzAHs+/wpi

/q+Rz19yTNgSkpHACKT1Gp3JmVGh7ZYlfdMqfK1ZUM
kpuutyzC7b5AoshVWaAnWvW0/Zv0FROAlqNKYAAPykDBQquciDrM25MKrgAIhYcL9V9byG7jRi/CGVUj

oU43eb4LfJ/fhV5LIqs8GtuCVv4d9uZ6nMJU/VlMjAbD/DUdpH7PU0GocO/84E9EiyvIedGwLVONkHus

Dnc5Z7vtEmSb/ib+I2eTpaUthHJ0fFeNODy9ed0RGw
fTtOW5Y91gFvEAO0/emG0GlBKyvEa2eP+NRTfjyd23hL1CHuRPeG9h2wKwmMMf01epIOwmpjgAvF6qMq

YVTprbXZCdtNFyMZNqg4Szl6JzQlaEDNUc95q0EG4f8iNLs+8/se4CYe+ZGAloxxFv9E9K2I2rA7CHpZ

v+nlbbFh53FlHKD47w8s6WX6t/qPC5XPW5nH9c5Eq2
C66aAWawV8D2FXAem3K0zie/Hp6dAkdVyrLgQMaAiknc/J0/QT5h2FhuOHJBU59RdYu+lxtbtim+m20j

cnmTukbcxuhhojFP/vTHSbB7mOGE84/XhvtQouIjxfoUhipybkpxq5znA4JrVQMQOHAv5e3PvhUOOR74

PYFMlVAX2E9Ai5zyDSGREjEjE+LpeOaDzPqdxphCKi
w4gpn7tdh2Vc1pBfqbcaaSI6zXOgVQARMT5G1D+9ujF2em/7s1Nh8F1BlBpWOS+WY8B5eqUXWSw/vHwe

yrHCf2WIImDkSc2FTIdx+TJoCMVgWZPFR7AB8kOh7b5C6A+bYFMgeqTfLyZaf40uW/hA9M1Gx4n14u7J

9qmDj/yD6oem0G+dvwFL9gLfNvYlapVoexzzhSH8oJ
g891a42O9DEdHbJmYgpm74aS1K/cRi/XlhGH76WEDlVEKIX9Qqno9qJQJ0KtoTqeJidvl8ACBJDG5QaK

OHC7/DZZy48LbbWJVSCfljK5YZ3k7tNE4stj4w8lwJcMk+eNv+8JbOUsJhGMjWCKGxqKate0vvkPsyjM

P5+a35rVR5XxXgWetvwhPK1N5tw5cn4r8zZRs5VM8/
0BwmVNgQYFA7r6lPh3oQs5B+HxzQm46fiCCGRHC3IvYkGgQTqCSozZNmy26SHFrEPegc9tZt5kgBWijb

5ojkNQ5mxtlZr6EVxzHZ5MfqHhLNNqoX0klh7oJWg7LGNXVfGdoHGCtL8o6w+56RmXKgI0PQmrv7/92g

01Cv1H/2eSEGmK9L1k3rTrZ9cCfLNyYmHtsn/enbpj
i/kx5ELQeqNOKUgsDFxZmFX4tXrrtTI/s1Csb0kJ7x08mbSy2y6SX2VCFH5sbxHN3r1iDR0DysAgTuJV

2dQlsItERVz6DmJPDzMOTFD4ShtRBWHpJI+ev39kEz1DqaqrXSSZVfYK52amsk/2njuviLR0CM/nJrSa

ilTimR50AIYj6zRX9i4JNoP8l5R3xi46vQF0+wpPGe
a1+XfZoFpvB0avce4h/unfhvUVNQKNJG8GIKUuo1ox1ySea/He1r+Maig0jaglXTPjUKrT+khma4TC8y

S6XoLtBOsjGz0BbjMpLXvbTdOetJj/tVSvTqaw+QJj7RI4Af94jS+Ng9xlpjT3+0KxeZfLVnUnQbwzLq

YjwNqgXqesmK7kSicbwAamN0KgzacTG9UsvKr8mc1+
4Gwe14D1KJBfEN3VdzoWGR1YRWVKLw1x439j5zrV7SjWJM/bv3OvTFE7fnxirkaHnjJ7g2Fa3rIsKntx

UHkzSMObHlZBzg1traGhboBBiEYX4dkTtn/jckdQrYU9wCOCDx1D/9kusZXosocVeLNDezSNw8QjMxIE

SmuITnHfHeshLz5Omjn7Axls7YTn4VzjGVAF6ehU0m
XTLgxdFELak0lBwIUCymKPJxavmC6x7Mc00Wp8StL7WgAVagKlNgbep61frUVG6lO/DS7CjgT84YNUOJ

pcmgg9YoycsyfP0lUin6G5B1a7VUBxIMME6DsmwQ98QNv5Dj4ZqfCjtav2dgaK/K47N8sxkvigX/zcsM

ULZ8dhQHCKU3y7Z4J6pfu4gS0wKrPsrouy3u8e/pVs
vzUFsMPReIVP2T3qeBMl82K3eCX0jCA/rr7DC+Z49qU0WjpHznxtzmydESGKFcXgg1X0tXmN61JNhjgX

wQbGe+obf1X2qIf0Bb79hpphhjBzdQyPbdoGOOcXt4Gc/9MA7Z/4vdrx7uma+ctOv2IBGuSE+adWBt3D

OSY/duWIM0TTi2eNXRXM8ifhhrwnC7Fkxt9RKjNU/H
rzztTtiXbJY0SS5pCx009dEW6YFsdo62LHl7h6YbVa45Gw1hNLi98jJpB8xWOdFJk61nxQ2MBLXzMaAi

+1MOwnQ9G8160vz4CWVGZHjZxKLaEz7W1YHXPUFCKw++fFdJxvN31Hifld1QW5x3mhkodF6csNcc3pGs

Hy12783KB7m9K4co+makr1szWz7tup+gUbigVNWr/B
9B3VpqmOVowjzMSVbCnToWD2C7TFYzozP922OULMFLspJ4fgNBG83zvYw9uVnu18vlcwSDVFE1aUtV6X

ReKn7nyQ/0U7tZOypnLCLHUApev4c5fdjrsML+cCe2MEQvw/05uaGzsRg6rTzYlH0sPdhhdwme0c1O2d

DRBYbHSlNKJWR3Y8vCH0I/h+RxUs0JFRG5HVDfVD61
DtRQwhJGZxouG8LadkE6fQN1nely5O7uLMBRJlpJ9vGxElE2i8ME6EB0ufFjiVTK4mBkW9TxkUpP8NIu

G0ZpHHtk5gXq12XMAehHmiE5/Ycq1swxMVl9ycJgcaJ+j43kLKYsq+9rNXFeyaGFoGJ9bo04u2GkH2Tw

oZ4hYO2A3IZT7ycUqnjZ1Dbzs0cUoLzMc4l9B3x4qu
SmcZB/dyzNy5m2QNrN/5LR+LfUYC6eKevaoxrNu3VXWFLM3VWltycXGX7J4lODdfJOSh39MRINsaVuQs

6TxuD31cMoVxNykMlX1zNN5Q4o2dIZxsRWMORF2Zb9IjRdG2/8QFr0ZCXdmdPYsEvEwiKODHMN3w7fiS

2nC4urxdYDy2ja59dtv1MgzPmMRl4Ae46aUHIfp8Rf
b98Wds7xc6FFiCYwuSbSWgfgAwfTbshRgRDQGVRJ3EW1iygFVmLS1YAAumQT3saCDbS+pftjFqhU3T1M

FNDvvdi1OZ45Jvr+60uCt7pv4UC1BZFC/NL5zb5HUYbj+BoZuKj4NxNs4GVh0ukrLHS4lHBzYcM3gbXS

KozSKx22+jwjBDgfKleB2nGnVQ6uBAEVBl3SUSFXIJ
y9a6gxyIcKWnhwVbuv8czm+ikcIm5Q47/nOxumnKd6fqPKOqbunTfOqFBRYxflGmMy64uqX84OqWVnAb

kquRxCls2U3qvSIzne42x28vnZpz9jlBgf2IMkhCLiUOEdmR1eNEDYu8eEv91timwYTq/m2dQ9OEXqzI

clBFq4VBq52FD+MhXTOiwTE0N9EJPrMAj1uJaiPjbA
Jc4lNcAsZpvCHuv6R6Re4t/G2dXtGvD4ynNHsfB9uQTQEvo4sd8NuCU+fJnCEQwoofxtTpQnlUdRruzr

HQk0QVfePKeg4cmME14R93WXOp2XI32X0EvBZGS8HUY+ZldPhY0pNnxNQkBCvBeRgW33XE2rGsPgckyD

uP1XzswVXhiwMcyEyD5uLOm3qWN0uq0jrVYzmrtg+c
f/zYv6EQTqJQ4QRE6J42zCme1Q47kXSUEg+eu/SWLFFPZ8anwkj60/dctXLJp/rRXEW2edqKYzpcJol9

4WRHeVc1kFnFIoXGL7m8hNknGgo669KNxdEKvjmE2NNfqaFB5m2I/SYhPjilnDsVmkvuMpEJ6gTigpad

mVn0+QaOgMM22/QzgZwheE1yY0zsgHPXcZBDoasRRo
DAPidTc9w/PX4MGoTyfg+GSkWf+OQHjUQLaozTRIXttgMNtFU7lYtzhAUUCoMcK9Ud1q9Ug04ZWz/NuJ

5zdZaa/o5PB1NRHSX63dOOglS9RHPabDtguVI7vQP2JEUVxi+lpP35V26UTFKOAvsZp5HcXbXoMxPwnc

W+xK4PRfD2I/j6aWJ3TehQI7TC89wKN4RMOHeen/3V
dOCT9wbfuXppVCNtb1PdiDk/CSMLfr+vhfl7EPZMzkR42eqyhKTCOZEtI82Ysfcok27Tu0vpEKZvxLY9

bzMidDA1zDkf/fWHtZXKoS29AWf1w9aA2HPRnFVPqze4EYlR7jIEhZGlPy7crgkqqry9Wkek2ch4Pk0e

vwa7RvdKm+Wmq8NC6v/oY92AQrmT36E45N1ZckPBqP
qUkXHhKCD6WL0ZalHMfbHx7CbOAmfkoNJc3FbonwnYN84S4yQgrinXxVusdyGwYR7+lO0Nxj0GmWEszn

ToJutMIKO36ex7nWf0xhr2qhpU3LLk1qwL/AvQYdMRF2O3pQJdqS7B1ImeDukfZS053XPmvqMnR8uv9q

nR5U01u1/uP9EviDUikxH3g4ZbdmoU9NpLCuvByV84
I/Jv9XynWx6rrgLYwlw5Dv2NlyNDWjTg/7NhfWT/tOcfp2BDd08X/+LsRneehYam5TIRJtpYvUAw8rCt

lYge8VLOryRuxb25Y/zXKZF3m3b6tImy2lUq4LF0kOY7enXiAA5qhpjUqh4hr0Xfj5MDMIZs/C5JY4eF

uaxjcVhl1tIExmNRJGxSJYU/ZSCEjP5tQvWeteHO3W
qm8RGFEMsPbvoKZtSYaBPmME68RIJsDrW914p+sMZgH0xADNo68ZSgd2rVynKNztoQ7CdKGRgnSDrqie

lzWuBACapY8ETvoc2F2hjm6WMaqvkADM3H39QYrvEnc+owP+KIB0nh2K1eyWn6ZGwYxlt1upYUa532jw

TOcmkpO9AQLgeUFwwodWKtJzHiShlUBt2kCRRkiX5M
K5MT8CR1MxN3SbH3jvWeeEhmXKCioZGrAB/chmWlAOUT3oxRUvNCw7y3QXzu8NYB7XcBMvM7KtMBsMVE

3aZlU509sEF8kD6z8CkqPi82ASoM1MAenyu3cON6D4yio0PQyyJv4gLgIn5njjr+Vu7bIUIjwZ60kHma

2VCFveRWFNZqqlBBfSmINsaRJqWILqssrwf661yroY
yXJ5A+JCKK9YLyXGUX8gLAnCmZFs7TL6AtgSywMHMAPHghIyfpx375LYt96dC4ez9Mwpy8IBmbv6s3kV

QW+Fkcl/keDZ/0jn3d+6C4M0P5cLNQXc91iWXRwUhgALmkwNCV9jCbFCR7UFNdgqqGOD+Cd301Dt33p/

4+jUOQzajeB2wB8s/5E/w80RN5GQfMOO0TGu8x5nkZ
bQRnNAFzvyASu4hzHYu/qJNplvyPnFkr/2hoq2GhgUyRyNkeyL+TtNVYKVUxes8xCCpUY2xOgc0eeIGR

ncjAR0Yd5jHgJXOgNN/aKC1hEmhZYDAv7K7ApcO29+ME/wfyhNbyvEENlvr9fcSZklEgQ6SWIttmu2E1

r8SSGVjLq7MGgj916qEKHz3Io41u3VlrEeiYqnOE4u
MiZ1HBBIep2ZWu9mOCl98vZbIb5gWjL98t9Q+U9cXdMspjh+QyzPkzcDT3w4c9Vhl4rNSXfEzqPi4/DJ

qRm+yMowX5SYyB2dmY9ycAm8Ti0U+TOCHWYT2YmiKkWTzm4Vd2VTZeTjCbkPJUdIl3qDqvFdRZPs0xJU

NBafqNZBfWxxj3DKKzgWzWBG0rcy3aup1YFl/Eskta
cqi4tvjEE8tlKlNfr6descu+BU9J900//+VNcOVXI8Tm32cJGJ+s3Pzbvzadn2AzG3UA0/ScaRzYSCxB

WzgQxWZC7lsb1oxiph8B8YbMwM6TL8qWyb3RXQzwDoe6FPG7NZH9OVg4sW8Ql62l9FlVIORX77XB/VKm

HexKA2o8s4kSOO6fKfkuVWfhuaDL3zsbZRJIceMtAi
BhA6EI9ijGHtSAqCpvhJvNJ/g8frE1ti1XG9wOXQQkX4a5DqLYhzIT5u/avT0+RS5E0MbEy0QTc5cBXR

xSsyPcmpUJQ6yMU7Ga01CLHnZbJUnJlbQoBPk2Udqgyy5yLucWbqCjrdwE5zc26Q8jBn1wDqk8ZNLXt9

ErAxkG4AI9Uodz/24qsmBwG4A2ck0E6UDSlDzEwy92
+Wu4tsMt9HbpNEGmBsPh5Jf38UyaumHtLy0P0fCDSQi0OBEiYouOffZFoLZzIgjohPsAq3FdJtIB/rGr

1jvZZ4UJPQn+tA9Z60nF40hSfTzP0Kx8RIrPSkBI28wmAXyrAmOA177kODIukdl9Lbua5rc9n4Gw6v9/

tDZRq0tSQ9jJHGysVBDiXeuMua5Y0C+ocfsd7nESdf
yVG33nubKIOM9c+mwI9rnItY9cjWY0Uncd30dInD7iqUmp0YqdQHE63W0YXJ63Rn7YnFk3X/yenatOLV

PWULwqX2OQSPhXxNoXtAVle3jPA5wvmt8BdzUHT0M8f2OvNRc8UjBfx/dHbR2e9xpfFq7Jfhoy4AR8To

WDuqAP1DR3XyfcJXXWUel7vvn6LkYNaZuE1tEyOijb
b733PZvJSaoGVCIP1kbzi2L/DNr7JkswQWAyfTNBk14vCZkWbT0B6KPmouAouS2gCPqrZiE7U5/XcYV0

8jsYuU5k2N4zit3eTDBIVX5qXDG3oGfhYEX6J+H6/diuBHaYzNkLd4XucYtOFUR1Wc+YkO8OsSECclLj

tY8EdAtwoOh2LK4AtsDKiNnJp3SFBQktYfhcWTQFa2
6WFNWIwc+xuCuqJstaDe7rDbRUOj7qLxsX9q1aDr2S3s0Wu5fduMW+jV0hkvy7Uv0hpc5Cw5mEHFE/bu

8x57Y9D4VwlbLev/MkVhYFVIKrqxTOOVUUJ1mLGGNp9HxJ0mgEZiqoy4QEY82d4Dkj/uhn97LU4Sgjru

vIr1eEyq6WURL4k6n4KCPNOO+UfJ1fhXHyIPm+5k4k
V2ZFaR2eczJzuGJk2lzFLJzriiIDsALtD7K8+6bRsE8miScsZ3JKj0j9B8GWB3gpX0XB8/RknVEoOdPJ

ltFuZkG9NcKO7KejSkHRDAUItdIYnxihQYaoPW8zk7Fy9Nf/YJ145AfuC6eBbXou9pLr0YMiXkDa81X1

Z62aPtiC7rWv2qoPijV6ZrI14hQp83fj4up7vYqe7x
oaIcUVzOqLyNvz9d6NOT+pwlFoncPQOkm2GkWFfVweEITRIRfawE1MI+nDnzaLKNreRypQJ/ka/+uEJ3

geEo3RdxJoo9doH9xuUiqI9vIL0o9Ohhxug5ueZy/OYWhx4WYD6pSqQqjoBZ4gsu5jZ9s7Fzm1qvckZ0

ssX54Qr6QHIe3mv9cGcO4HmJuCeNGkVO7t9RoyDae/
hZhoR1mhTrCKGCQrEa0r7UxYYpJrD4steUSloUDti4IEnEOROp/TUQVUMH7O+eylg39ormAvo178aEYd

1aKiuda+8IRUy5h2DzTEl9Qf0z/CHgKubt3MCUHMszG6MmK0dAKVGqd7WPSn7y50CM2ta0vUdF19auxc

Zjxq51gSmq6gi1lGvTjvk3TyHWr1XU8iiVhY1uUThs
+GzlrQF4OcJsQgOYy44kM0fs+pp2Ar/QeoAyd6/Hrl850UuyqJH/0VICXWlsu1IlZcoT14bKNo10DD+m

aOUNBQjMXCNgG+AZaUiiXYIxd4eiWOXnmvI45dm6diJwihkmoAOKr7siC1Oq/uhoz4vV5fpV6WqCickX

5qHm4nl/Ao7HY+NXSFsIS8j1b8hNPgUwH+4Wq2NtxQ
qlYZsi1U8DdKnSJFrmiR/v+eebJa8kijH3LIYu3NELqoef1x4zS7jC2EZ/X2O/i639aL4C2qyObmOLIK

fm/dVfdbNvRNV/bfzpP5Yo4GX10rDcG3hSWLhCzlB4vox8Q2wDk0DumqoqFUtdm4J75dezjI35aiD9rv

8cp5q8HvA4cpXiSy3CfXH8a2UIdall+MyimqOPz2Zu
eGnQXTEaFIcxYD8GxwV80qqI7EaARB8s5HMCy6WEjDl/u33K83Te4KsScX4h+QlyE+j2aDw6WBC6Qcq7

rIAO7d/iP0NGWxbg9fbCHJ5Y8YgU7vzDYbiBv1DU6snJZkg+Y0Jw0HenjxXaGDk5ja1Ky+ivRAMIn1f4

IwdrA3ejRdu3VbWgjnapNYerrwgoW4XQDgSO2JcD5X
BDqJZJIWMXi36U5Ffn28FiqCYMGEzKP1PLGqF+ox69ZGbfi3hAj3rVnu7NI29GQblIIXr1jKnPxLUazf

dLJ5ufZ3fO+88U5lp4hadHAUezbyrRZp/5+E/fT9prC2dUpt4f+U2C32DvbJzEYSpS2YT3NugDvDw4+W

Ak6mKyotxRjDJprjk1qkdO3tPIghsXRu6jz7dnoapl
SicRpAFrDnAsdLqta12ZdyksBem8yka20ttMKrY2z54hM+Zlo7tFpHEPiu8PyGMbS0E+MHTXL1SlvoJ3

0bovclYtmmv4olZaDO+zP+lvI0/dlWrJFPZH+CN9ARzjYbMYMA1Ii7KrtGfz/8kDcVv1TjZDlfvMyIT3

95ga6xXGu8mEtt0omWoBHs6tKfklD1di27iIvzEj0m
4fPuphS1mN7xKhAgwbuyhV1i6YB6X24KW4lIZ/lwbFSXFgaainrhFy1gQRPK5U03pVmXEC0k3DBNeXbc

cmzVu6GLmtJHkiF643+wvaCUdcaWGxcwiVhHAViNqPn+qD4y2DxErTSCxieUflaGMRZ3aFH667cU9hn5

wZBChsgnsRRZtWWf4UQM0KlphbioTF4qYhhTli+ZL2
ZBjmPlDtM8O77t9jFSGmj1FKWzmdXwQl9UVqOzIFi0Bi8bGh26UkbUoLa0ryOdxKqZMpkLb/cHVNfWds

LAKF4gKORBsFt20nX0jQf1zq8qD51Nfz/6sgtG2Pgen5yDBazv/EcHFCYUkZXmEgl1g/d0QqlxbbNVDL

YqkYIodMVMDW6eClkIf5TX3vqGDogHnd/sr4BPX+7U
btUdqBn+LqyzXuF+oT+A4x4bXO3M7e/XF6OTHKxg7f8yhsw35d4p+flRpjJIfc8Cp2LU9ZKdTutmg5Lo

fWXVulwoSOGDCKnS+7H6SdeFpvP8zavfk0vhkWKPxIbWITbRgbPgdn7bH0J0DvS4KZlz+eo/RnYko6nT

CFc5vlpy6ubqpm0f+0mjgTx/fbE9+V5H6WuFp8smCt
p2CE1AwNXoVagPDD40yE7iqiJK6B16YVV5bGSBUs7IovRGa8BqJkIdiAx4+iBq5MoMAekWZ39shroYw6

pV+1O/88FdFohYVyCpaiMxenOSIQC51FohV7xeWZDFch1Ia7f/zRi97tZNSHOx1gevAR7rzkOgRI61hF

qow8J1SiaorNBMYn4L5ckDfISJjclvZ/6M1zBUQb3z
y99BMNoOt2Nh1V1M771ASKB9NtuyFn4/rbmpou3fEA3PRAvFChRjJ5WCb3eQlT5C+484T+lxoI937bvN

FaANGXqD43cjlQvhcda/ryP6IhEA7+CbTctmSIxMN7MjFoRPl657Tdgxi2EzIcYQHJGbJZbUyEz/MK7d

vOdxCLQxgPWo9yjixnF4RhPCbDxOk1iCeJsFkadmO1
gjMc/a57BA+egbponJ9mWEypgdxxv6AYsXl+B3sjNTA2UVBdf48SmdDQ993YSaYQ54IZnfa+85aYJfYN

wbZNpVhgLMNjlTeOetrJ/ln3s1JcgXom8sF71lFwMRiCFBMeofqOkfKSf/t7dnP7SR1Pnb8FKPxoNSrI

SVqFBHbOXC3+PtaumVyjEs7nOwaZ/DblX6ZKz8rx3+
bMQwYLRiAH+DgHZMeYXWGApLXmrL4Afnwfg5mQkHDDsWeBXhe9YmjeEjG3t8PI+0cc/yFlNEmsp3257M

8HU2a+Ri+nBq6uf1dRfdoRmwd1RsnzlB7LWzoq55sBPtW8uLm0m7yORINFBz95MloDN4Wzd+Fn4tbKLE

qvfHUlLadRGFUnKSzOhQ3NBj4njX9FvI1+yadZdiY8
9PY+nUCd8toQKhLbwdDxfHPYJoiTGx3iI1N6+GCGcQ25An/wKq9dY6brcoqaikU5E45PhSHG0w0KwyB+

YzMRCZOD01xsEyXNwNK8sxQ+GGX8Kyt/4lidk58tpMqV0K4iL2j5Oc6F0U2epOMpVeq5mDbSBt0gmCxF

36nVfI4GCjylM3tJalvDFF7/9LjbFWu5mHS1iV9SdK
l1D81usNshQzbxHAvKtP8WnAxzKk/kNnYA+OSI+CUSNLZw6MHvTOevfuIU9Er8xXVBrBr1bGj726Uvyc

ueBO+XbyvN2m+8Mvemt1NATnNNMlCt81VsdGBwPGKbpsQjp2O/1LgKHrn+LaGHKgnKAFhwuQcAyiu/33

QTmIYLjj5rDXL+7JuuYvGrm/e3z6jfhJixStqd9mak
Fj1u6iUBRhgmjbs0eet19rqgA5vCANZJ5kXFxwtU0UCh9yvD4i+SpB4V+OmmlGk5ggCXwGwuzyXzcASo

XSvxOBxMDHlB2tmj34s5vEltkFkCNkB3b2ekIIW1gSy+7k53RNeFH7wQIZ/SlXcl8agwz3gl1i9JMil8

YxSQaaqKteXv0yJzEojjC/mu8JKLA1uqWCVQqc5HMZ
MJIrRfbV57fA5skzddR/nVyIVDChpoDlolCbDe7LXDuX32sbKHaqVETB5o3V5+EnfWYoYPjTQVwLyTmY

YwcCX3jlT8eT2MlIce11q79KF7wsqWksyevrNiFO/+eR9XAmRweMBtpQaNThhzY5605tilPgz0n0CRjn

WP+8WH5jgv+8/ie7xHdX5t+HC7cz5S8Tf/ApejH78J
lzyN5W8gZr/IjYGt3ZZBa3i+f3iG1cBhp7EAtiU0l4SQXwvxM7QMQypW5DgfhOon5aGuZ41NA0LOl50o

KGqH+8XifEeffEOON7Kz34N8YY7BHRjeRv8vq0lJulWC7+WLI2YHtqUKDx17PbNeF/G91g8Oy/DZ3HzH

ihxMvakP1mguqpuQjKHynVzQT2b69jlycZI760NXFF
Ph/eDoj42zdqy5eko3eReoD70AUhOv1oupWOag/SKbKPG/Zr79ZFTH1sp4VsoQI6ighO71uyy1SZ39kf

l+5/bKNnLC9WcsENSmh/uEzmMDumaZVVlmGWCwq154XojO9q8zoud7VnExrMQUOQgDTw/bgFnfO2GH4x

4WMdZAEjV89m1JX75Ul9D6THM/x8rhzXhB7owiF+5F
SGELu1jkxOE0ZcICcdCyM/aOS7TV1MZyNO30KWult/34CqX+VJpsb+k1q/pcbR4980mXBx+LcBBNnDDw

qgFA3cK5TzzWPidq1eFAZTd3+NEIGHk05eQrAz7uOZkuYG+UyWzXT2IUb9PC7hbFIlPzAPyf6IcD+kQC

J292/woxYPSXh6onKm4l3xXLZ4BRRc3FsFkc7rbhHQ
vDdw4SShrq0Vx4MVZypGPnCnkOERog1Ms1VqjnMkkGUZlO5L65nU5z3WzZjEmxHY9xr7ln27pwe+PNPR

SSfeSgUQR2VfSf1OufAMACu1kTRldc9Ot+x4S7isOS4/k+sxXXZN6z6noeItt2w9IrL65JPLChHbyb8I

NyX7Vr9BmCY9cHW086c0gpySwKWdXTg4l7hJtQGKlj
te8E07E/6VaaUg1ZPyR7iXD1W1U0r/A6ovfQTcesd8YsxnevLJdsWLYk/aq/x0rXjU+eQhmu49nLX0fj

X4Q1W+bnGzxhIiSHPH6i0xHugJ3S9vjjETebSaUhIn6Drp4yjgiPAHF8SfwnSjg4ee6nsFM0rxYtAJYg

4Pd/FLZyKMWD+QFhhZOUFDAWiNrSKtkfcVb6SRVhBq
rjhlXVgx4hJV/r6Wd0eszza+cvIEGA07ZtN/S0wzAaWhMRc0QrwDTbwIxF9Jwa2fPRCpvfn4/wqN6p4D

lSF46fYujq19jNOa/hJVYQGN2z52BzJwBFPacgo9DySYog4X9lQvYbbvDJJ+ZoR39NOtiDz29LpStAFV

iO1UPNuTCtk6OjIu8nZlksxr1TPK1Yj16f7qRKujtz
htDapLAzK+WZ/gCTE//NZ21yDOaWgNux2vMx4eHZKlpPk5n/prAWpxezuQwl814P6SlCHzh/Q0TO79Yt

iXwDSQR1kimiZR9wtsc1T6gFi/F+tBcXKAhiE6x4/glMyPb98Ophz9B15B14IOzxN/aPBrzcbjwvFYTF

0pEaizrzOcPEgGeH7+nH0CJ3E+yxlzM+V0MilUKaNa
bAbsYL23utMqzaKuxTi28dxrfcD6Z2QwA6jADpT9ezfpcUQDoOmpKhgYCFLF1N1YjF6IFWCOaupb5c90

DdLWV+ZqXyH2hNZo70pjVWa7uL57osJXs54+ipFQgYUvk7LP4ufB7ckCANmI9BtMo2eXz69hfAEEcmBk

WjyqbHv8c2LafpLBfzR2xmlOHWJydQ4ZU+XmuE7ft3
51d4QNCgojawJ6u0If9ClnTCZspNdxb4MCJJc8KhPx2/FWJrOCaUrGxQhEhD8fV4AK0U3ofQoN49NT3w

AxpnbneQS6k1PrQ0snEwVgxKxP8FV+vMq8b2+LfvLeFk03U6b3/fe4ZuvaXw5iecXOoYsMOL4nZWzejJ

OBKUy0idOluilvT6v4CMj4TBrrdMa1UDrhYxY3vb3T
zPZN6C59Q4GqU9wce5rnTAv5FRaJcS9GPT1ZBZo/Jab1h+jr0o40AC2DTzfe15p0OQMlGy6PJ5l7ZjNW

d7iwFC4u20xJVL8y/kth4MLYY60HuevRBSBDMqTO4t8aV8d1dYB5f39eGTK5cqg8Hlq9IXoiyAHl0uPf

YrP+5A/Y5TUcuxswVUjRRAI26Jga+uW+SR1r9Wimps
Sr7PrZ92tQ4Y0+RIer10htEKXAiBISsZ0D3OtVf1NiLaHZRI09f0OEEY1F/zMEiRD4cwZBfaFpVGS9Uy

rTxjJfWC9kYhBiVC8OVVKsi0JOr9LzSX8DguCamG/aIOidRCI3FTqZAqqAvUF2gsQMb9p/pVXvlDbiVK

uLo7A9Y06M89wlIohxvYNiJUBdLeECTsO0193d9kz0
35oPw251/j05Vl7ICT25ojNkwIRhhhrwZ8udBUolX/Marco+sx7LGKtSQoIvmS6uIOeheUjW5nOYlNRH0L

vJ4WyI0dk8Q4eCP3vxAq5nZttJDcrJ+mekiBCiir75Zmduoxoz68XBJcx/oH53qSlJZuMCCTc9LCheOm

Z8tjK0xWZO/2vQ2WsQ1R4GgQKtSJ3p0YxcPsgfAdW+
zQQwc2OTgv9/zQgnIIPSIHKRIvQekqqfpEeQF6EqGodKwJ2Kl+a+tYs4m1ouqKbI4lX8994N4SlpL2dC

vyny9TsiH836kO4aYwFy2fvxbzT9TI3YUIIcP/lYK41LbzseD00u25TiOvt17b71jI8E+Eedvz4HzvyK

LU6FO+jxLQ4rV2mRVLjHGZ0DNfFMylMPe6mteH3GIH
SzVge12KqHl3pd5MccJqbbO+5hADwaYRo8NCSA02aTv97RXbjw5wUbGL8L75tch7rT1fyiHUxUz9Cuba

TJx/Vvk4ZMjOvdjwbunLw07sHYquRPdhTnQ9b5HqnAOr3SiyBqrtGlYbaZwnL0aV7k01XUgpAri0yqUP

ph9cr4w1ObPTWaY2HsQmYeqQ5DEiaLmx5SE45S65L7
MBuMsb7qWziaX58dvBswgyXaNH0uDAcpkppsyycIK/BfjKlVfSzau/Hg4QFmfZnsZsp6HcR1hfCwAmlx

P7NXaD4ZuwS63VVhumqps20fdM+kIRUDXKlYJ2XNBl5y4R6rKi5JfSD5QWdHiEfcHaQE/Z1Ea8qqRxXg

rtmSQsuS2/7r8h28yov7Y9HiJV37yaWZoWiBLJcjla
33+o/GaWA993Gw6zbw3Tf6NPFf6QgZiDFBzSyeBxDh0AkOFDChAVP9m/fUuRPiCC2VPnkjEVzKXPd6qQ

gQ4a8ff2XIeEgkZHnJizcmG4E/NHjUramHTP1wnn19jW3PTHuOiQe63uK2ebFqXsz6sG94sZHErBgTyk

DOGU2seAvY7sN83MlTiJqna247kGYjU47muRQNcK9h
9DgEeqGD1I4mZMvRtgdKY7p4j8M9APe4kqao1r4PlsfKGgwtakh/NJpklnkNKDh6xj32ZIwDJ6JQ+Ya/

QTxYux7kVqJE1uPI91WL87zlhARfcIej+uEgAICH5XYF/vi+cIm6v19DvuIuejmg/tJWrU7SkDcoUcFN

63xILeeRkmqsI8ct2dFyfOoePcR6+YXgVdg2AlsXj4
aL98yZCilB9Tyk6dE+4ObO5R/kKKkXoF1Rf7Kx2pmjJoOgGMfVAYz3tqRe8hFq4Bk9ETN6y/rTaO542H

qFs7eBuZRDQhD7N4XShE+hdq3RDNe+/jcF1K5gXozbzYwdMLwVXt5++GAtMafBN1Ou7rjaPVPf61WOCV

itOulonxehNckwBjZuCUtXpSMQC//vj2kpSaP82SoF
22pjKJWD9TbbcKxZEFX2veo9k3Ui2C0M9pWnY/TM5Fuv/XknvQLlz63x+hAKOWd8r0eISvUg9WDiIwsJ

oj3floohjdMK0yCdTrNFBTx3Jnp3bUZkzk3YWosp4U43zUHzuBplMr9nkS04iVr7fpyW1Gsz82RYx/WI

oYnQQBRv8aPEdFLa83+C8QPOf7VGKsp+4IDKh/hIaG
QFACoIAtug2NCRYfKEa5iQkMu36jnZI8fJ1D3jA0sHhAg3c/o9xBu+u/jVetSc58om+wHu0ePuNaj4yx

utOuW+fS0iqCCT/uPw8+JFOP7iEk5MhR13OoscyzqgUdZ2QY//GWQbCluOCaQhf22J/B9REr76tL50m5

HxGXgw9+my0TQDY869WRLKXAVueGQblWqEDKyFfHVf
d9vwj1AloHeNcC2Ra6R+RrtoLqOZSUnMU365e6bIjh+pz7N5wYopqvx9NmjpUCSf4JWg6HxINoBdU5ek

P+Far5t5n4BwJcybE4rdiuGMqXR7r95evMSgnq5HIQuJ9z5yKZRauT1MV3XN7s3D2LIdhvssHxS7kAyq

htvGdJcwnHn2vAl4A0o+2Mnkpq1m/jzi6nzK/SEnEV
0vS3SYrex9YJ0g+EBaqYj2hsSYaLctO4dvY59XEfP66WqWziGMxKCJqS+azDtiaVGIb5Pn9m9oZ/1/kJ

Ek/OcMFi5v18P27cfoH3DocvZ7qtpIUXcZD5ruTFiD6CVejNlkcV5LEVqMtuVeNr9ZSA9BIxJZ5t+4O8

Du2nvLOLKq5k+dpQLapCbOwrQrxKTEBvETnIE14ztW
E2QIHhSotWVKr4PMbNvc8HbxyFX3svKBae9O3mQDfJDZlDu8grWKqt+J3i67/HONiCZrOiXFSlc+uUmi

Y/C/SlNU6s78PCYrZN/bYY2YvuSgXEf2vjzdbSBl2G4qrgL0f2kkZoKRcqhsfxgCbwP8+LvbSEDmKdaC

ggMEEkBqkebucjaLGeGnnDOB5ZXxMrHHswI3j5GUX+
SzSJWTuqbbh9hdT04/8CRj+lX9F+JiZJhty63VA+Lqb4yW3IJpZJ3HNuZFouY6ECa2Q6FGQW3zOJCTMT

AdHIgOh1Nre01PzP01XKNGZ/IXmSBpt7M1MsGhUCinZ8skI9Osn/OHjWkPsE0O+OWLkUbj7jlNZa7qqK

91sUB7ezQ+qTVdbD9MJVHAMDpJz7CHzdYJ3WV7yLH/
Xw48/15N9wxZhieGLxUpjwY5zFYH10UXnYTIGNHPHw1v2WvBz7I9/lampyC72sX+jilhRQWHNGBFc9DL

JmGWzSKNF6bXo2Rw5gwTDfBaiLFR/OADa594zUtntcHTIeduA6waYWrwq2f7Y4CVhZK9WVjYpJAVRn48

qucMpJWJxw3dFdGmpiTalWdtK/vNaNTPs3gB+1ht48
KJtC6qIL23LT7Dx+JkslGWikAJ2bz37fSEAyhURq5N3drwOgxt+o438arZ6+h5w6u1KcvSYl2+ayPA7B

leIUoR0pJ96mCc45Nf8OzLm5NhbJ43+6BQybsZwvoNQoJLyP85TW5swQ8VXBPP76p9RUrYE36QV5MDKS

BBmau/JwWxJfElgiAvVe2ziMUQgj7Mm95wVb43P+9f
45uslOWgu4e9VKYtl5iPeYz17r8vBKe/5xJWNPtPQlwPAT4XaSUS58eLqROQQhZ6geXECc3TWQxDGNRB

TYZhFq39buTiNTeovzivQSK3A3qJg8oGkeMvyd5GBGXJGwfUce3PqT7/QotiZal89+om+NgFhkgOF3Mi

HCSdiOw1KzMKzrpGiE/wx54zKOm1Hfk3r/nhJKGyph
7nzjmTA1pvz+OGO8zCKnsz5zg+NXRURhBMr6fOi6BVpNItgb6Nvjm9GNPE4RLBMbSZxVPgQEzTJ0Qgr4

mOJUVd32femEz2g7FB/uqYmkoGJ29sDXRlkZ34eTSQndA2+jwPYwr2n/5l/yyzqnoKUmVhu7XADr6i3u

Edgardo+/yrufXelSLyDbMsqhX3qV1NQ9mIDLtHVIHQqDh
zrOh+ly7hM0vkg/S5Up4OamnWWBLt5+teoOgAgrxE8ximngLBRLZSAH+SwNNRrdaP711IwHc+3tlZ7WD

8HwX+FrDdlmS1uXrIuGOKa6kCKoz6pVLuOkIkpDNrEj+FL/rkpUzmDq6ryveJeiUhwg2SFDIxqICQIhy

OTFKFU/d7N6IXK+Dx90xSNX3WvKESlVCN+UbfoqCrc
XWJ7pKuEjG+/Yyxvz/apPkd76UJ25hqlwneR1nJugegjTf9cY/0qUPTtwMxSeazBthWXY72H+1RsADfO

LAbFN3Lrtz1SMRGjlcBgEDlj1BLE52bqFe5y71IHiHQW4mjAqtS4CH5n078D4pfTdaIwuSOjg+y1kTa7

XLJHDCVBp4Y66zXz5BwFM5HZO6m0gJTQZbRHVSsud1
5Tn3vBTdjROXMzYPlgFWTLliFyz6Y6RZbR8qja5Jh8HyGTGksiV9BHrf3xwJmE51T9NJPbHZwZmy2FUQ

iQP59cBjHDtfw2fnSGwwfF2l2UCocnHNWlsXpofkx5vZr/3w3WrzmxGeKBWxIfANyNf4LtUhvPGBeu7S

x770KKfQ/Lf6qOqUkr1vGdIS5slihO9dCz1a9m8aFV
qoe/RhZVzhbAgIATPiqRLMXEvuaeRPBhixb4bVSIWxEhuFr5pru0HpU18hhtLjNysXTd3/zVTNxZxzNV

TedhcpqWk9ywrgu8r+td+9Xu/H7ZugXYUO00IqlOmF01PVXVtihkhzCHSpr6tC5QaCjGBoU/No4m+0Iz

LcmAztIvp5EtprS3K6w5ZN/zK8F/Hpeu7gX5cFJNS5
m7tC8FTsPtSxFkN0mFgvfx72zm3ATVDVyJQ3CnFBhDNqZK8QuNlhlkfO9Y5b/lIIIwfMEQltI0Ycg+IV

o2a37Fi/SxeuEPlBo7woizSlUdTPYtQecekQvvvJMQ7lVj6qKsfKd7EEL3xQx+0joVYofgAsV9ODSaf/

sQsTpo+9xrjksCUI/6vYwhm99S16qvfKR8D0pOLySy
rn4uaqOWSO0SdC9wBNE0kng9RLisq/1Bq0N0CPeIrKE9rkJY388GTTAWdO8gOKTF729ud+xSEif3XTDp

+vDfrdqIgoY3Q9JECuD6vOMGoaxGzXpfOpHNvvMdYSuptUyCYzjN4aGo+jypJ+tGumme6bFw6Z15yOME

pHZaKnNxktQDC07RmvcwGHRqRU2LSBKS4E0Eq2mAvg
69u9PlI9W8zAqjvicK/KdjZVS/HPFf/AjIN3QqqJFAFHjR9C+nB4yNOrmou/X2eM3/rcO/pDSPmDFkfb

WCKRfzu7vHNVu+QXVdkY7muJ8kKwscfxlOCSNHP9MbLo2Ar42eOtKv/mYMxqL1FDFcYYH6xbnM8XigmU

iXRY+x/3ugl0Pkq+DyXYaq364a4EcUhLab/Da5M/u3
N9nRm+D2Z3my5cOyB4L9YBuk8k5AFu/D9ozzKOo4zRTKV1io702n8JPgbm08qXkRMPzLeg4XZIM3y2Nl

3K8HmV09Bx6Cn7CbaEFodDx6RNEmCJHIHgEA/UcW9ylHvURzq/p0dvV4/d3LYirOUNwm/HXOkPffhvCN

YPa7l1Ub/gOJQ1298+tl5+GV+CHmEtLmB+CueNRcaw
0svXzma+NtxrrJi4vvinYwpOEr3vyCgYLykXSKKNx5aP5USOU5rDaJywN7S45fR31zfwWCrPzr7Lp1UE

O5mkZlDVJgdrZlQKUqGN1ISE7nb94ljW/e64jjTJskkPgCkPXINvO5cOfmF8JAHmltojLDUKtNO4em2a

ry5H2GqlJXJy02hyM5rDtetplOF6MiRgGtHiVkm7eo
FD03VR66gKaQ51hnNXR4+7W8s/sco++OlpmbHb55FdnmTLU0yPpp/CTwOGkxso9LQrA57C/Yzz0m5gr6

8OFGshVnoFotDGJ0TYCJWHd3DA4tNEu2isjycNxMgWTKz8ED45okoPw46XD547bYH/G6+qoJFg8dzVrx

m+jHiYfpFF4xpI2JajRbvoOsrjiSJAU9rEleUE+RPz
7WPJRMUaYBGS95STwI/wreuhYQ6lRwn9wnfcjhdgLQ0aR0d+V7dZYDuNtN2oqMjievImK5ijAbkpEBW+

+CCY6oUXWLxaeUrAiYmScXBc8buujHZkfCa/QPrjqV9e28V2sCd0w9dI7a4omxZ0a2TSnD/AldvoErmB

wf7kZ30FKQ2aGLiK3aSvvrNc1aBZttlUgkAE4Lc2tE
L2TFkDLCIk1MVcV4QSSy2Ng5DawPJ5WFm16TUWESC/2xYsjnZvVx/A0TVzYxRASpc34ZINVBzyqamiIt

5lD3idMptuKnEszUiTp8Tjfz6dZ8KuxUFXaKADesjpV/YnB4tRsH94hCbSbOeYYnRlv2z7rICDc6GqnH

ddwkfd1/r/rjJj8fhG3gAucc10Oc2rFJWch/hyOR/h
4vie5LxSAl76odTeRkSmpDhIUTjBe+LD9S0KQ02QcjUwXpy30P6AiKQ3C0NSYFOLupG9jf7ipghcLfnI

5/ERoStHlDhbnt+u4GMEwaDSdH4O2eJUam29YFnxT58xN9xUxuh0B4fBgqDvdroLIvC9q/4yMdpu7Qr6

X12fTdXC6dvqAKmcF/I7y6M2BSNffZ7x+5vM1FCaYK
uj+IBxUaIekZXFBRxLX5NDzYqt/ZqQZgWzialaKKvrvdQERAJe2KOg4SJNUdlXXUN1o7rGh+IyR8YQXb

CukfLDWWdzPfavreIKe4BxQjioJk97WIqkCJvXlwJe3sneejp95OwlrrWDW9wvWQX7ovXbHWBk5knlbz

rEFACGH4UUcEwvvQtHwKHizUDZGWzlEyv4gMYngnzw
J3d3aMOSM1CWEyJy/VApBo8hdqf74oDcZyDSMuTQ1P9oC4FGnD3qe9nt7XO0mZL3SsY/R1FJmU8YHdXm

qjkO0gfLBGzhM4hh2wFRszl4/9dU86mjeHhwm55nJl1x2rYIivbpULPQzbLgbm8ewfz6ztb0z/C3Wn6+

AWqOQ6PhSaFa84kjOSz60wxYPhJaxDScFbefoUjXhD
0sNDmMoR7emIpc9TVb2gixHkChZzA3vu0KASQvDWjIolKX6otigO+03ANi/QdTl4KDmc4I0bgI9QULwd

iAmI7EW595uwW9YkKoJIlr6XfebsUwbpN61ZrMl+hJwS2ljHvjzdLCY4UvyOjo1SCcakd68SUj6J9l3h

0n6eHBjJ/Xp4mrYd8DPqQsFlS4bf998bId0h20Qf7I
8ICtpTot3b+RdteGRsDuHOpYS689/OQ3MSSDqcRO1rhgGRhy6RnGyr9LMdbbP7aXw/APBfnCWcPVgjQH

fKiFbTFWXFvofDMdsaJ69x3ps4oZx223vnbHCeq7IYAvpL45c6yboLFerAT4uioKfIqYik0SEWVYblOI

1xD5JQ0p8jR2zyEdtfL4OYRBF0I+5lWWrWzFxjkSLh
fXQqtk9vUrIaOAaUA0by9rSNYuHXO6wY4/SrxCEqgR/mWtTp9wcStLJIVNUHHJvBHmkZwq6Vkb6HkfZA

iKMri6BLMsp92zXLCD2mP8NyVTijrYCIfUoRWOWpnAKn02fuPr2Uw+7V6EC0ii8H+DZefjliBl4cvqxa

9w3+VblkNKf8R1pMTPW5A/9u/gwRpy17tZxNgY6jHt
2irDSvUn481IrJ6RKZ+Oq1SYniy5tirDYC1+CJQNd3JSOytFNeUwRFPE0NECNOYkzfym+Sqxn/YxFL98

2XjPIDHcK/VR4T6GNLWJq3C9xGkHB33kS0YOYhdeWcW5gir1VBBQGHT3Fr6QDBc5xqlmjSi3pm6w21I+

Kk6oSr3v86zjaH7mI9sVVLKFPdqLT7AUiVLCxP+BETO
IZQrIBiRPHdtWWc9nv42sYApb1PogRqfm1VZg87Sebqh//vqYsXSTVx4EMq/2GlhjBCxSR5tE/D3Iktg

pOmIzLgHWEXScpA4h5MmoRGPnS+N6vkiW0s1fpVZbosWcVNzuLGeMcl6GO9uyI2zOojtDrrx98oO5Byo

K+ISnIED6Lt+YWelfVLbybqfBUmKoEajFJW/BFWQ7h
KO/fsfsEqEOVIc9x7f/J27VaCObJmQAeAM3itytdP2bO3Zf6Z7NHG64bmucKFgKwr6bP61CZbhOdox3F

wvnXmtdKK0CRYAYTsOcaraiOXwbKgIU5SClWuDSPBodqAlNNbyZ/XqIZQ4ZPyH/dGQdhTtwNGmex5WLN

1b0HqJU+2HGsJ9wcpPf0mJkZy+/4IUcaqjJVSPfBPH
zwKSrBTtCo4p6iqF5nUj3hsHcrWLjg8xgR43Ke/uJxAHdjkHhQNsF1B/JxgVxNCzfihdifqiL0/VlGK0

fr+LqGrAUVIB9YqcnblHxCsRl6gHn1BvseQS4AzYo+Wz7F5SbTnHBd9HFPOC29hNj92T8uhN1I2bQxoT

mALbrEYbxkazByko+32bODS6n5yN2n7wHDZ//npTPa
IlCsDhB/Psn1gzUSkP3y7ZUFEEcxmW+4fKGV0wgnIF5skUtwI95P0FrU9OPkSOYwwakmiiozbM+45Fle

iQ67wxzNamF8uEHnhX4PQmW1FEyktSiqxZv50lhNePzQMlRdA8KvP/LJ1ARJurzbCtVcy8BKq61IBlMu

RPG33WxqEuT/RvOvXnJInyq2dSyJRchQ+1vUQ+5egM
h7P/kmySRysEjzGxMCKFj8BvFpjZaMtkEo8QCCgKOjd7SVva2J+tdStD3PQ/zLiRIxlBZYsSJh+23JY+

bFL1r7J2ZS1xNr2U6m6qIfyCjrBuygFth2I1JUBGZP8K2cz7K9irzY9ugn+ne6Zj5d9BDtX1smdd/N2w

7E/ENJnM05U9Hhs07ZQMqvFzjZ35D/lIPLKUA1gAjJ
Qwkgeb2W5yP24i/wiebXlQdmNKWu9ypuV+Rdgw404UgeKA4OgOMRmtJK10jhxS/BOVAr5MT/yo85w4UH

Vnp7i2ngvih8Of2mPpOCFY2OatcBFN9nWqbg3h9sxk3J0Nnveka39he5xLuW9cQtH205V3Te2lAweZgk

JOho/oS+i53j42QgNlsrlGn+CFQQTnK9sZivjBlT79
TsHhBH+WrSGux5YV/0Z21vnwqeAs8J1ocS3HC6MUjSfBq1ng8fw9bkamCAPY56SXqdGeNG2puMw8mIMd

v44peK/qCok673ClK8elbY97q6vSKxJNrxlaDwDsPmoGeaPfCi+a1LVmRSfQ7+vdn7hYFhMsN5+D3g4H

QeGjvE69vf9P/hPg3/zdJsU51c7GKmEr32ePUMzBGJ
jrB3kW8atP8PqmyD0QF1uGW7szGQjJh55puLVnRgnrEQoDWV6LfjZH7W4qN+WNbSD8CKxxzFY0SFmCx0

DAHyfkqb+znuH8YiEkrImhvmUTrLJvmZu1oNtb3tTBDYcBhgm/Osxj/jm1ir7dgBW8siXDCQftrbgPP8

e3VViaADjYiqfctDpeeEZk8cqmTTwo79hT7R85iXzo
+gkyMvMfoPzAiDnFPD2STjohMjkdPElxfl2E+PzC14yh36ch2J4malyoe4GUQbsN44cevnauEsoghadG

4aytmU+HayQinjC2rWn2hSr4HIkg9I0YuRHqh7rgQEhoEuShpwMBJU1sIL1sVPd20yBcEi8VGbf5Aklz

Ntn25CkW9zW5hUteC5e66VyrD+6xEVyr5nAUs1VlQS
LYD21FIDDN48B248M+VOhfppC5Vsai2UwxyPoTOOg19mQ8IwUsqZtL7stLevTOf3i0RRP+Je9IUIXj8v

x+20F/4Y8Fsxy0Ov7UvLe0SdaoI9jdr+dcUiyGpBuHW0QMZcgjyYsH/gzJQkPoL51UzzY7DD6rZE01nO

h0I7I06WiBC5n6oYkq3DCQaegUyBYj5AwSVVxAPhcp
QGZ1MqVdBTvY6DjG6U7vBySTcTYVqOnOPS6ZvVLBS1JSpHKYZ9hCEKk6LEK4eLmIf/Z4BwYYsl3VysKN

3r+zq+lWIx0cfxXlsfiQ6vGDsF54yoZ78rWqboRIAsroL0lzZdvIoPqCNISB7wXApcdbBJEtcHsE68aF

WF060PvDPF97VsL25QPLxRV091kVvK6eu/mb1SQVU0
6NEoqo8qahPvjU6oOVyG/xvpPMVIh5T2yi7oh+4VxtEtCZ7OOBEsEwNGDylX++KpbcpHtrS5QMHx1Lru

thzO4lalMmQ4NKolRpp9ypohF6qvOIFlhjIThnOV7EZ6z2ckOLO7bC//Fb3Ha0BrsQJojmF1/s8R0Qvb

sKRLrq/SWPnB1mrqeCfwlFIgcXThOePVLtGBWs8H0o
kTnFL75lLK6dXgiUkqqDSAU/pBF3lHewNhjCSN46Fr8Xtv21O9fO4pDmRn5qgbVUvPeip4+QQP3tjkNV

8D6pJHht9Oq8i2s92DsU1pWKmvU+eahNRV5aG8QM+6Dii9TppEOoriL9uZuLOmEJTzkHfPNvfA1Np3pm

u4VNL5kBjR3iXk+pv7E+AbP2kcEI4z9EmDBOYQUv+U
RXMDzkDHT3kPHYsiyg42wqK5Pm+REvGPHBApKE1Nm5DWmb9oIjZv/OrwyfLDBVIF+c+TzxmaPrLzzqVl

DcRdVkPfwJ44pqk93K/Hkul82xCO1OKmNiSvvlPHoUAkjvoLVl0Se+8rBPiu6VA9htxK2LU2qEbb1MbF

3OiKA6f2YigwTKxR3PeImO0zSAvD0J/qD/CnaXBw1a
WZ7ABeDQueFgT6yWWDl2Mnbgcpoc3q2vCQLhMw8Hy2PR+1zOqI7eLLMb6h5Lhyx9xwIFN8GOTbSPyVQf

Q+G0vvmVs66OUQZ40fCzV99lnEg8vdfDb4I/DM5TsxeQBsZZetC5LB6DSuswr6cQDFr/hgBFVN5e/OM0

FmnMRbAyHLLi9sULcz0drtHBdsJAj2oprNIy+Z/6zs
ZlGtIAV8tkAbMzZnKXvjvu6Z/j62+Ki4c4/kUu30T5xuCn9JYCmoaB4xVq/Rx/j7ynDpDbHtVMDV0k4v

v3z7V4Z3mApuJTO42ErliDd5jExdC149cYVKyLnzgRek41DG9+hZ3sGfdf/FwRzbq82eSefIm4RsedPk

C7d3hIumExdTXwkKrnAOKZ5vFZB27mn9dVvxOI0s02
3qmvLN+Y1t8a7Yu9GOuuUOuE92d4NJEG8teAHH0gUsq7JleIZW8MBu9fSG0UN+vu5gcEeAxItLwczw+6

wr9/WzomvA+s+hqjnoL5gJdFeDPZXfo53AXTf8Z0BplZ9QmMquTHP9r/ZWNBAl22qTgiNVq7AFcMBoW7

vTuMRFP0+kIWMfqbUsXMP5XJZAjuMIgsWC2C/bM/Bc
vhLz0zTfeIChDGxda4E/PEkEIl6x+MfQ3P18zkyug9Os+znCD7yl9d9OOl9DQiHPMz4a2OPvjZQI62hs

gPoxbx0/3xymXft4nhB5Zc2bZkLVW7lIDqILMck4WzaCWJ3m/uqapsFfSWl2yx204etpD/RJaIi/5iKL

kmoAxYJzpzzy1f/qfzx7h34G8pIyvLEnhAcc5hJzFK
bomUzqv+pmqezqaAs47qn1tf0f2eMNEyN1ZKiNcfBXJdEEv0FoP70hAlYQEmo9hERAtLKPYt7KIjvCWL

8NdB2NI7lRe6CW8C6H1rWpkBFxXBaqinL2qlsBg6qccHPGTi5E/GrTB25y7axPBhgCbrxtc24s90qwEg

CghHvH38jZzzPUpTYnsFOkhsC5C6h4kY8dio9gp04r
K8/itfj62BzjbILfwv7GheyIHrYDDxOdgkzLojINOQrxCvvj29+22y2KAtUC787MvydlMn8b584Z1pCR

Ah3kkrr6YCkyAZoVtGX0YWKRoo2Q+oNyfhuVTHV4qe46jZCWK6OR4H+aCBZneYk6vb5IfaYjhALqh4+E

sXFZqd1CEJFQ6VzfKwDXvAvYIktuoioCY4EC3qp/32
c6Uoxv4DKCLlgm2niv5RqgDmT9IRpO76/7ohfQdey1zrTVQDZjQ49gk56oeMiHLGrc5KoOOGZ8VaUxw3

K6UmOwuLBZKxp1SGco636L00x8icxhjhXuhx1Y7d80X/gUljHQaw2yjVMqr31mD5FhUQBy6U+Ad9l+uS

igmRfaJZNenRLClWCgqVmBwwa2tAqgXY2fgcr2POK+
WxAMrvm0IScFt1sV1E7zQaoprqAMosbpJ+TPXU3F/y1A+awpOnM2UpAnxxG6xkLGI9dOiw6BrjHOy8Yb

dQ1RXL4BUhvnJydvqlYHWnTNLvHamoXibziuhNn76Wg4pxq7EA2of+0fpTFnDlogpxt6PG348L4JE25+

EzlRGH4b4ayHX6v0VaH4G/jUSxa09X0dWnNZceb9QK
VJMlle+13gTD+zC10QBvEOscVHvs4KSgAxUjdBwCwCX7x+qqoFjxuV77L+MiU1IvAHQGzS04aDqRTGph

s/hbdMexvHbyqohtSY6Andr5Tvbm+sRiY/ORlZLr82jztfuS+d9sLJErwi/+TNisIjfSSGDGrdqbHFqs

Kha9vMQObG8Z3/4LE6jnA/S0r9+8/zSJc+GI/3scZw
lRGzcezhPT4Xspff99d2yJSoUGs7aTxSisFbje6OjgKWwh+OevcHmshuME2srDIF41N5miYr5iXL9hKe

IiIi+g3bF8eYKspp9/+W8PXovmcSUfLEAA7i6Grt37pntJ80oSMJIBwzVGSa285HCF5xTMLBT8Wz/CxN

T6jEjkvxRSrFAMUmGXfjXS3cvxXEjJf8dNDmvRxA3e
EbgKCrl5O2uFzhMmxTx4noLYjbpJx26jB8mBSlVxWTTMJpH7o0GUbt492pOGf7ZiRBaNN1dyiP1mH86I

DaRI2UBzq+rSns61HnV+24fJHfdOxZZL4rHkqdxnh7keaqqVbH3ucaHW0pqZ58uNNgu+0Ku6Qc3/8xyG

odxvMlU2el6Ai3yT3Y4xJ2iiP+6rvV7IvkBnaBTMlJ
z+Akxxm5Z23q457AyWkkJPw8yUhsKk/0n9710bRpEm+0qOuZrBC8EinPbNJVH/hkPDosdvuj3AR/+mHD

OHHvBUnokFv5s1guJitMSxg7qvDwTmTCi4mI8kuPZ4w8X5CGYCs8etr4V7DqYG2OKboxlr3wEwNWsn83

wUHB8OE2TXZSDHQbrBiFIYUb/6klheeHLt1Zmv8Pew
eUvfMD7Vvk/fIMJ7OO3zq+x+IfOcyJjPZ3HM0TribzBje22RyHW9mrHgN4iqoMcPQzfTowjptUyBiaVW

cvGn9P3rUZ1WP5D3ohDBzigqgSIgDwQIZ/2zVS4E43EUJK6XGysUn/J64p7ARmENZtUThQAsMF/UlCB+

s0kNAUL768zlWcC4596k84Msfyka5oiuyvfAne1ZNG
cRGCFH20xMwv3STKci5cut63S8/vAd1tW+vt/rxTcfIA9BFlUcC1daV3QEsYz+cxu8t4WlhMPu17zjwf

K4r1Pwh2EO/D11+C2lNaPQdq6g6hA4LzBP9DRJrrl3dKMxph1spU1ME2ldLKGqi60DmzbEop4NDqR2u6

ClZ3A3IFWIV4GcZyYgN0QdVoOtoxIrfNhL6kxsqfM1
/cwwbe2AKrkEcPWshklA3XZ0IkGFXqM3hFhirPFrgt5yHIJhpZ4FwqREO5OsAsF9BpyxgmYW/HZIYk6+

uqJbGuv+0Fk9xROr5GfQXnZ2HYpftvbIeQFE/qekQijYnDlEMTCU+hakd/R1+E4jUHWnWOixcjnueuDW

UP4tqVtS4tp16KlEDDn+R2Rky+i3lzV+M0zVz4u+xj
5JNC/rjkw3zm987rATwgalZTQ3/MXuyccWFh6uZvI+prOiul/mYLTGmLkfmUNNFBEmnBy39Smi/7BPq7

xSfz2E7LCzx/9Kkg9T7yVXKc3WpTipQ1ZoV35Nqh1OufLEW45PVVFSCjM8ew9t2lZoP+ixI/jgX8aq1H

RAXgFEASNVXf/0SrnBVO62Np8LQYe+WtWXQozTdApe
PdLY9HPxzNcHg0WZZu0BvPyzLPrwmF1rziRYolO8udxdmewd7fvETzoOpm9mysLCpD0c0VnEbFbJuilp

OYqJuWd8zRJE0IqZ2iDOsok+xx/0MA+42znKPGmxT3FktAW5S6T8zCyYXFY7VoxGoE4C6Vxd2Rmn6e5+

QQk5yh1a7BafwXpVSLuSCDU48NVabHayfHGzf6ROJt
iSYbeg+n4L0zKrttQCib7cY9uSnfMzv2lcLR4Jn+De0ei2kfpZAsN8QXYyqurSs6xQi+wxkL+zo+Tev+

REdG4wvfnFkwHMX696sJ34wV9zdvp0L6rK/SMH79RLPPKMFz7YUEYNNQi6qQFnyekMnQ0wp5Z/92rwmD

bASq7/kMjHrHrm+p2qBusDOWGz03cKGsIfYoG5mVeH
TcPe18W6uH12pgst9n5182c1ggcP18MQ9gmqiMlB++P0N1cnkgmOy4bMhLMzkmo/hzmoVLoEk6c57ZFi

e/Y6fiRt/CJTAD1QqMsgR22NcfBPFexijQmJPfkAEUpeyZJ9fZoHmJRk4SRKiIZXMNMKgb6wBe2Pt1H6

sXVXa3YOU7Z13LGjNmnfyIc/ChRjcIFwBP7ammaSR1
by+owooD+VOw0PDsLvCY7nk+N0WEwq3LR7HxynC8/haQZIJqu+s5cu6ym5BFJUwqRuieTt35c0gMi+uU

wnueOxrbImRVENohge0sI94H1zQtERI+jfSnCEpzjmtbU49LLQlon2x1yMiI6rVftg996fI9U7NZDO00

8ZQjsm23/7c9XSqzP3tGMB9jRfYrOoxB/R7QEFFg4z
47EIlUAXGyXoGH4XpvpWv6P8yi43MYc6htfjsDFv5UTesvpwbM/+BeZcurI732OTBUr9Zb6a190s81yD

dfMIln+9pBP2E8EnEqJ8zH9kNwrwiNBYEUcft/aeM3p/uXNnyVM9Mqt87lt/id+tCSZ30XgsXvR8W2LW

KhpaByrM+IXZw5Qp4MOugags9DRch3cITvzTxtimDn
VRl9bTjQKxzL8k0a6G7vzhpjTiNc+/bzvytMMh0aQOVE9bF2B8vLg5xvXK+FoPOrPNKJYGA2uMPsOR6m

kwsZ05qppLEWNc4iXwE2g+cPRTGu2WNbS/jnnnhhDCHgS0JxBfP401ptA7JHC0fSYUY3dz2bwYYUsQiv

/9CJM9iD/cL9nH92Zwdo1mMrm3TesTg/TwMV9FhdWD
pTHnI1S/Zh+7N1BfjsAoXwsx99xbumofqCeRHaP/WePug2abSmNyhi1VK+xXp0OojzMyilElFXzpXj0q

FI4+dXFc6c5wXbntL1Q1EqneITce9YoMcEyK8C+y9Z3W9PwwydGeKjZRLrvY2/ki+hRlG0UqFP8tdgjr

tsW5SCgIkVNWpccp071yGTMwE5H5UFroqyqdpmpwB2
GVOrIVGOLWWiAxWQjaHnT6V9uc2NS7zpllyxJqtvu8t3rioaZz1rCMd8HolOyHSKfP0lg//+vqT496dE

pgt7sBKjUOrHyncSAMYbM4/7fCdjyyct6xjixiJ6DDwzhc1oEROKIqXzrfjVuwPH7xNncCnJKp/9jlj4

cngVs+yrIujefdmpYZlbSMzsH+MC+jw/RHZM0fwhsI
MQ2TU74+txu4wWwu2ZecKEe6Ji5o9Tfc5BuxnZC1CqPe8NPZps9y0Ln+XClnXUmfe3Qe61oM/Yt/PWKh

+1njb16wV3q8ce5r/H3rNJTYkGU/FawJJ0wDpGKcqMAKZ7KO6bXtH9VpHsQxSyi0u/NXmNdrjV9dvkpn

9pgZFAzzmxKj6EZgaPdja12RRWXjCf+PVkWyeHb2xW
Aumuo3/mRYxxQyzs3d+ib/RCEWG69vlqlDkcLE4+PFhOfix1lVr5bDWtHdPD1hX0ybZxFc19eodHA5Cn

CJxkxQ9v9U7QOe4gQS041T90KU8AD9NLJlgOcoWoqe2hrZg4ytipDm5ViB0TYraix6atPSJKIqvcZIe4

Szrpm2iCOYtPS/6mkatSJPpkU0yIJkc8zz9v0YPM3d
4w25xXbcRQlkPPiebHAf941sAPWP8F2KMrqwWYd06Q3GSfmUql5L2ysbyEUywkOZZo288+Mp539XGJ6k

xZHeZwrl//clR/Qb0wAVlWmU2WGSArIXVv488LygLTac4M8LMNbAbh6Fx9mIMdaPl5GVdUGK5VDS51iN

zN2EBZ+LF7qziCe1nkqhaaEk4KAi4G1KTS6I3sOPwB
1UtG3e1JfecNaXZnlWz7hSjy9qorsYA8rmQmqdNKs5TdGIauSqaxtxapmFAkjJeXhwJKpcgOkolng3Rd

rdY8uiGFmRg6sUa1jkri/0B1iF+LrWnsgmdixsXXAIq583FMecu+aaLpuT9f4N365bwy+b98zms1iH14

C4Y15tLs26RQ45+5W+yXcYTACeGfCk+JX/6IAfz/8b
5yFZ95WRlVek4+jkNsS9cG+phX7hfr85gMVZ8sqJPGw/za3Xbta2TiY1sZhdztEqGR7cHrsEsPcvXptS

jc9HR0bJ1p0lzS+QazkN8lUDs/0j2/V917Ey1m7GG38S10cZFX1eCSPvQVjOuIHDQNKskw56snPaMrjd

Sywbm8Q0TIdImtKeOPQzmuDxUbBBLb2rfVxUNus2yi
InaG5QGTKgn4mTfmj52IYEovA8cVtQZ897nJzFMdpBmydh1wmeGjkeKSuXIHqVxGy3gOIfRoqnifoYLo

Nmg+mM31og4MdPLl3CcZmhU86UjGVB2TD0p7LebIFwdPI/uMmcjILzCRdOKKpJcyWNKN9u6YRLAhqUkU

AJGic62JpPhewqA/tm9svBEHFTUvBxWbgL1FLmpJJo
lo7s2RnPqvtf5oIMILNnbiHb4Mul3udwCK40boTNHbWtZA24LW+5sgGH9ABBEF4scySzwGRYqsQ+lfcp

ns3YzSw152zUiUIdX2kpjKzAmf8XQF+OeGxhbAO7wwDLCbxsWhsfAU/NQ0Wv2cvBulyNMZpqvenQYxv/

gjVejLGzd84QZ7BmXizePqgQifQSuw7C0H+gWrFIRk
fhaVVvhfdANMcDaLvQ6ZUmWAMkb3294n38OmwYzWtc1ZQ5SfimGfDnVQh7NnD0HsviVNl232rabatDuI

z0N1D7JHNdL4Nf61Jo62Z3SIDyPnBJf4ljqlpAyfF7OL8csj6wuFMgOtBDqTJ+eM5slgxPAlbENDcpgq

fiQBXgpRHnx90mQ58fdo955qakViDETjCUN7+yYIDv
y++rPpSCjJgi6qHa6uy8GCkXNoqYcFg+W46CjCl+GhGNForyWTI/mLaB3i0LU9lCXYRMkKnZ5MwWbBgf

KKWSeAKnTqHuZ6cbh3zdj+8LN0ji/MWjteNYj2sB75w88GdJDKDGPBApptT7xQNTH/+3Og6ypp0CuRMU

kOYEzwu8KuBlIJiIpwMAax9wJ9zpx+UMGvXBucYIxv
+PzAE0yHwUxQlVFkmpGFO80Ktfk3GrEwd+W+B/mF7dnMmznrhTRpmz7K2TVbf549g9Xqocjq2cTiOzXC

MZ2wbFb3ZiYmNiSJp36LTkVkAxijWzJ2+YsoehpSz7TM7lMbpkswEf6Dd1cCnhx+uryhp7c/Vpo1b2+W

j/Iaa6XS8Y5FmZ8BnKH/gWmQLJaArUzRw9e8SeM/
beKG9sdHarNSWpzfaa8FI1+BONDomOiJ2UKLIoEVwWDu0Is3CZMwMoULiL2AmuRPmLrxWUhSwYEg1JzU

I/nGrwgRYuwf50/FsFanjtIzKOsLdzhiuO7HUfGRfh84gU0h4o54g4XqyhZ2ASYJQAHHYnW3BYaKu4ec

2rl5DBGp/B3grksn7Vw+PakJHArk504k7Lu9+IGjSM
MfZOmvfbT4ydRzxXgOhmt+AvGAy/4QLplar6D1PjxDK/ZE9dJwW3VO0ZH3sreeb+LO3mYTR1F+PpD4ZU

clf+gJjrfYmm/HSZKAtZOL4f8XpvoeYL+cUm8UaYrtOBa3X+Ycpfff2b2eHgg7QbfQLsRKTocnZhjjCb

Z2LgQkEJ8M1t8bSGItO67PMOwYWCl6lXBZhO+m/0Ac
txx45/W/TgzTrL+f+cMg2y0yeov7qkPQ3Gex7wcsgt46YooCqQ4zYyfVL+Rb+rk0yEskCtA8k+Yy9L9/

r4hGZ4Nykz7Ep5Z3vZUvqLFxxs36ICTvV8FXOpNUeu0ed76WMw04m2w4mt2wo9Wsg7VQPzlVimddpdMK

z6u7g51/cAwRCyvGtU5Aff0Fou5qoW509xkvQPGYab
AwXuuh91MxvoidsODKPZ9JPfSuiRNbhoON2e9jHb2Vjc8GFFzw9gNkMMgw38HARlsFp/6keuZexsJD4J

AYCSkkXEP+dVPOJwy0yKBRBdLkgrPpoQaqR1ldQpH6Y7g2cUsv9deUqRF+Duy9NBvyWdZ4iLlTnjlWGp

+LOwTEGp9O/1pz8IpFtpBeclIUftBFa+YARC+tTe8M
57Zb+CxlZTgceIfYiVT+C2i+SGGDWDyauYmwIAkNQh5vYf3F/8W3aFLMw5K4UG9hnw+O2Udx8CWYnwZd

4B5U6LvxnKoEaIQYVcLc/OWnstZgrvlfkpvFGIiCmYByq2KTvig740+0W1hj4IKHPcA0F7oinya2yLyd

rPlE0ZqKQF8SraVoFf1R2texz9VJtcKbPovKWdEb1R
A5UebybjzExBX8N2XdpIesOAhEatgw1tDP/2LocYVoa6zGmmCeIUPoo5Po+6Aqj5m9mprG23KngaI8AW

b6VZHGp661oZnJJkOtSjttSMbkVZbaKVv+uy+3RGJWQGGBhLZcI/o27BwR+hIxv7CDkzui0rZG1fk2yn

i8kVEEzgVzsEvA0wPAAgKOIcnhBEvCAsVD/EKfRNTV
J5R0u3/Xd/u9ED18qL5u1tW8bDgtQJawJd8QS73NqB5EKMSsUbwZfWJSQDR9+sk9Hhqd0y6/xwXc2jdO

gOFhOMYjTc/64bvLTJ0t2UF10D8LirHdyir9YDm9wTcJcS7324fuhhOzw3XOUs8m5k8D3vzTCttJihuF

a5lQ2N/fPdICpHXqQv9SbEyHSdHg/khP9xHT4W8bcc
jAEy7yrMmkOjSRcWla1kkK5KiFfNQdzo27D1ovVaa1y9r4bJ3AUfrAc+Zg2v9UxHp1YKWNUX11YsSFSJ

rPEC6LagY0SkG358iYQxmMngWLh+pwbNPjT9tG6h7b+L942Ruh8olkakyJG7pyknaXEr6wYCI8wKDfGd

qm6j8disOBCii/qCcKiD9ZU02AMicLwvmYNsE0mNdV
2V+MuhkcGH9iqMPdUx4kCIkJa94hVNDA+K3VVT7F19Q4K6fR/ed3Up6E450CgJtNRc/TSD8Tg/4yny3r

nvtYlWCAiIyPDsEIx5I19UvWSNuiOiyw1LlFnc0s0JYKd1rWpSh/6L1xlTiG5+4vRgBLkLvbM5l//A+V

4tBbAnCyR44Nzh2NrM2kP7zGZc1QpUqcD7vCASGi8M
XW6cvWRb24HcEyo5wI7WP3jAh317SZj+/hfp8XQhqA6pV1T+hV18wszvlYyKspGrNjoH83Y4RnI

KGe/GE9l2W9haEXU6Hv6ggwJ4B2UDQSpUK0UiN9FFJUENpx9metNLCuWOc7GEeeegnUdFjOIoGn09LUe

IdqF3CQc8Ou6ZR69AKjXx5hL6Job0jtFa/bLD2S9uh
Lg4uGc6zwgvJgqunvQx41w3ja+eTYB61Y6OVBa0ahYQyPjghWCRaLfU1amUG1K2MJV7ffoxdUcVbH0Ef

NdttW0hoZN3LtAI2/3OtOCQv+OCS5d7uyDgSKn6IIa3u3Nr7heI4N/YXd/8SIp4u4s35hWofvkayYIpp

vWZ9sIJZfaYz8sL9Dva6mIn1te+eEgsFHMKJp0b91x
LlgjQ8jjabvIaYHBGlXyioUJcdWkTmlNMcbmpUsdAfW6x14YwLjPJ9RJAeNEO8pq0ReydcllyljnTzLj

oUTjQUSnPaBOY8i/Q8aksK2+mMsueIEUetGiSXzClwHY5HcP2eUVc9kt2pGgUoG8pI5b2uMpb/B+hSLF

CdVIYCt0e068J48oZvIcxZMt8fPGODosSE1MokLoae
E1BzDR8dy4yqQAFjWuAawyt9PN3vnovF75CLZWVm1a4JEc1zc1Qzc7CTxKEbJht/wx2bEm/z937yK0ix

JkaYev72/FmR5LTg1zo7GQKpktNgX0e6Q6FduYHXjehAS0I0Tp2b5HXJM3J3xRpgN9SIMay7Gi0nPn3n

7xNQaNSuWQd9D4SpBh0Iz4zd8zLC+Fw6WuFQriKAx5
okjT6WiZwijw3wAWjBVWC36zzaWoewW/ilxr+lOOtHxu3e/a4fQI2Lv9euTujCWWqTKZvRumhMJX2/6y

pEKF/1OByVvdTi3RbAYprYL96G+VQ3FSxiyKzlcprzIYiodFXJENDuGAeSiLibFmZcwlai5py0i2ggMn

pu5ea71bS8MMKmY1Z0Dje6WNBLubO/YNjsMaULawc7
j7MhnmUVvINuoLwnrSjeK4NguL+eE31s1WUvnZ1ILp1/t1bmAjcTF4/qhYWmMYCRvj1h8LGHL/mUb8sX

Vge530/DyUxWs4ijMGi/vsmYr659A8SA3rroTE8anB1Q/quqa5fc5hPU2Ov33OdkNB/hmhCWqIHJBXJ8

ymQt6Pd31upnUXMnuGMv1NaDhqaxhR3lK3u6GrLK1t
HXuRNd8O2kFPi/kBkow7B8bgSREuclFUU2P19DHxoRXRuswUhmilP16/Rj3RDz5ATv6DycMG1pJiuY1k

9PsM+VRtyj9AnpjPhOEj7PsSxaB/dB8B+mbbpPzHEz+WhbCyKh25DwsHe15Uidd5gdAMMbIEo5TJm5kH

lW52FmE1rvoEm+B6zB1oT1h6Yx08DdxH+pHOkmzGiB
69LfkptJlfkg3MuEZsd2LAoLHHq7u+FhiYPoAHcbNmUkJsVO9XIsoIy/uVD2JN/3vmXKWRZi7NzM5gDR

lSb27cbiSJsUykjIeUt4nyKIimLfUQp9p8e9Ke36ttYEac713yENm9OW5W7fgQpaqfyuT7tOX82lzyFI

s/9YeF8M2l7wFXveNKRUxFu1P1ihOckjp8VxjG6mKS
+rMsWktmE8TFfKWe5ozref1fl71sYVZv+WbedMJ+qOQTWE6SuDVL8yM64AvK+KDwTnb7QY/TrWHbHWdb

m8XwKNe6GFHAms7g2TMDBAGjTFBUzic9/NFg5+wgkOO9E9+84l/Z4HcVCEOgaU8a9ClDptEFV5OHJVv4

H3U5V6c5SSAD7GI8+rpvPw26cv7nzJ+YEJUC+F+fV7
y6LG5rwEAOOnJd+xfRUS7njEkVQ8xSkEEs3dImCCQHF8lkUIn6Cc9ifEXSKpFa5AhjxSs7SYdxl1WaQD

ViG/4ivkgdGhpqfZUwB+bbKHLc8561cq7X2PGKHZRiqv/ioS2UJAaxeKVUCTB8a+7yZFy406AIgnV7/F

UBWHAWcoy3kcyRTlOMdecZmRLZd1ZPlz84lSAtc1cC
XpT3dvfyLhSZA7aEsmtIN5gEegbw2KNAg0nECadZV0FqucFHGJvBhLD1eraIWaIlWzhQf0Dtqyf1ztiM

c4mIyM/UlpFpl6ohMUvBbjQQ9sQGwTOxlW6d8xhjQIpjcB4Dmhc670ZHAa9I3JCi/bcmkmQdhktDIP46

B4QW5Xz7beS85UOOfJ3PLauImN6lwt+WFL+5rQcmde
LKleyE5XgkZ6GlN3oui+UyIfAJytugBU6A7JDPpTZPNQdnqZIp5UESL1qaLMYNJX9DahQxkt+pQQRRpa

LuAKBTQiA5lpWTz7Q926RqaVAuZqAWAk4BbxvMWCSw8A46lqDwVCLhJ+bCXTu30bCGMMZSw498l3/1io

DSukUloX2S3Q1yextAORgjwRpcMo7u3w65XcE+DiUt
xjA5IGXcl5L8d/Cn7cq/C9DpwSUAFHl6FDUOcDVQm+lEK01zDfl2k7CB9DNqupyr8hncnru54i6Xjn0O

yugly15iwL09LCi0WFgP+CKNta/4WwzpAH5y+oI6MVYs39Zf6u+a+sWBZbkGbU9x4a/Ggsxu7zaQgIfK

jBTfgF0U7qLH6a3WEpOyjpMBcDl1b/qyULkiRYVJkw
zEibWW0Ai6yP3zAxWV0EN2eI+vYCM/CB3Zq7B3jy77GmWk1Bwy2T8jbRUGjpmPtBfBul19FE76Zj2EEf

S9vZiqo+38FAVNq5b3hO4prp60Xn+d82EZV0tEXgJKmSkm8nEFA5Y1QNiy5S4bPkSDro6lTrQeUrjHZ+

EMV2i4tUdQPTzxXiYRJPkJSiUy0nwa2SxJtkoGbXjS
JVyy/LW7zbpHYdJ9DSQgIBFWlRKxazMQhzAhVg0iQQHBrZvhgTNnENMwng/fHM4m1kwsyY3/19XoKPtv

X+vPTavRO9M2p/I9Uy8bQoIv388PnTbVf/vwpSGStuDZQsW7hHo9aDP24R8/wt538JJxQZdap2tw/nMc

8owYi0c//1lBl+jjjI0pC6dC+H9ZArrcOB+UPvsqiz
G35bqXrQXncyrJ85hKVFlK5wa+3J1xiJTyb8BKl5NHoK/F2DBU/Jkw3B3lcHOAmu9i5nuF8nRVIOuG/j

+o9haIxj0KFQeCFgdUjDUfI3+3Cgl11lzHwO9wJltakQgRiqdxvuM39tKXSzEealTV3xu8t2xTPlnYZg

2R6K0+YHmAC1yZ5/USuvPerEzfRKIDwEBA65u7HOdM
fnnoaB8Izm0nXJtyWNK6TNlv1eUtmtD5AwmEPJdGQOxOpEL0//ORQhaC7SXFh/qN8I9YdbkpWYAuT55l

bF96KibHfbfz246OIUGgQ69uTvDvzXLD9Nuz1tse6eUFd+eYaXln+i9KCpjZq3SNce34I+u46lZo7lau

le0qLdTBXo3FIrf9ftV6X1t+An/CN1GxLLZdGKDJZU
XX/7vfcvWj7HyppRjw4RDC5Z79LhbiA6BRsB7GNSqKxTJZs3jlgZgzdTqpN4+eWmG/pMYZiIsfI9NMju

eyy92y4HRbRbKD8pIXWUZL9/gdwwef1rdu/aIAv4D0CgK62axBDFZffBWe0TtC0t/xWdQkwcktkc64s4

UhX7xWN+nRSih02niF4u+T87d/DtzPt/FQZSe74GcU
kue66veGFb7L1d9I8Z+fL/irjvhefYz7xKXGyrZjnlPGNuV0KRb0Gcw089SDJ542p1hGkO4TUzojLhLQ

DdTP4KDyvse0cZDMfavqphk/axTYt38mur+wFxjL5vZZCGzm1BT9/VDt7QWDswI8kUZbUx9YbVoOMo5j

ftjVm0V0BnLOsuYZ2dSU+dbSyvnNWyvltspov0dEiu
d6pb5nC/+flAcdkBdlnIFtekwDDi9sgHi3UyEnxyg7F717MgeuoaHmO5YHDsSYyi50TFVLNdIDMDv7b+

pYKD5xitpGmYKfI6NVckbSi0NoriKT1wnC8yR7Yb++EUTa4WCdc9MM1CQvUJ/5C1yEhcvUr5WOisCWOe

SdbF6KvTBT0Pbm+VUwDXi+RtyIZ1G1l33l+xgBdhpN
5XB7IAzPwmVywe2F3+37MD+xW9Jl2Dy//+GBs4mmv11cqRYlSmGTtqhSSe2Bzk5IXw8aVB3y/gmNUxDJ

x0EQMUFIj16B+iWj/URSHKhEWXYaqPbc8vbG85Xmf3CTebZKPhZgwvmWag7rvTgNc4CzfJPf5fd1bctW

MVf2/3qfkeaTPIC96COq1rkmnGTAUbBFVPN9KUdTm3
VAfNDFw9oSB3K0U8ersY4PZC2qTFaXCRd0B0d9l0MC+jB13TvluwbHsG5Ha56Ghqsq+QerjyDWykSL8C

inYgwR6eI5sc1iSfAWPnahiDeBwp1/XWl/wZd0dHbLZU6eofVN5mofhVttUcheNdBavPMAmj92sWMnBp

AJ2MCzlZVR4fXcFOsyamfde8YcGHMftSSiI7e/JB9J
8/y9ZEBrnE+US6hnxlqBqIkhQXAKrc0tDjSeswxnMmQKFNoqg3Y3ebx61t8Uka3Qr9Q+0w3Le3UE2tkG

8Zx2u2ljCew6/OwSlOGdQxbXSHmtfWPiK4ehLcRhiEyyrX4d0AnMgd0SXrVOj4imcCmQw2qSQHJ2kD1m

CzE76E9wUcelkOyigLpohC0Z1H7ojG2E2sS6byq/yP
OhCchmUrEb4td9+i/2bQYOk3EBGEZl0rMR6c6TH6VMFPx8p/MkQfk6U0xCWYaVGQ5QjmAJ++3+aWWHP2

ydc/zc5N6H1MOpAy+JnAP+dUnHZI0Kdd0wvhZc011D9yHL8th4nAg6Fry7cSXBSMBQqM4P1WLTAvmaxf

SxduO3xZIOIwOzNDK+0uLSs23BVbKMweAmXq/8phup
4RhYq2DEQCL1XE12qbYdXTY197Xor5Yrd1XnQ9OVgO5nC5tAS/IV9Y3Y8clQp3w0vBB+9PGOOU6B/8BL

GGbVg1NiQ8rYF+6szviwNOsAsfxNYp8xo7Vyzt31/ieg5f9plnwKZY9McPkp/9XrJSrfSv7F0cOFpZAG

Q7R/uM/ze4RrDA7/DHpns3sWTY+2LXkxBh6mq109So
aaLW94oCbkHQRGxcZXiKlkEs2rXuNc36AOnq2H8pi8nmU4W2Ee01rcv40TjlJcWOlLvIyb6F5GIKVclD

X99m6rW4QJ1PjbzXcqmIT2YIdpVUOBtFYG4kqOvf3smmEQ56heVz1clDPwypKBauyr5ARNLEXr1lNwcg

4CzxheUSr/uUE99nOAC3tkbrM9JV0nmXUCuL//kp/+
Pp04vVLOQFIHi/br/+VJrvEaQR5MaimejxEqyI7ei65Tvo/+J1dJqsllIeElp6E4wlHwmj0xLuDcyQNG

o+trjJ6qaujA2LNcfOmPFktoMujuDDEV6XdS1wl8cKUv0RM7h38kVQNI+YoCL4S6XTNeCZnbGI0MvH9k

Kt5TUPJSsgkLPvoLG2BtFD82doOVTAAAbNCfOpf2By
nVm9sj8ODXP3nyrnSbkD3RK5gY7OPXQ1SuYZIndXYedMAP9hwNEU/ax+6jn5voeghUnezXO8Y3j/8Oet

/tblKvprSY0RuMOfQvZ6LskjihWePahV9vaUtm5jbE7+HaI7D9TJRofmg+2Dgb1ZegzW6r636TmWhBBF

GCVZgTSTLgSFTe0EbxzlO4HRUQUc+8MXgCGebskEhN
+GD6NTLMfZSwxeJ0xziOL/CouUuU1EkfzYU/zZLFJwG/vwPg2qE1ZJ9pm1ILIyuLzHukTjf6OKmiEm72

Qnyd5oJc9Wj7XExY/1gs7sZJ7GKNgkFQMfxORmoWtyoXI41Ihq1vS47G8xAuqtBVWv9vED8srAgq3sFR

I78AEJO9s1I9GtHQLL+0RcuQz4YaI2SuOcnh6bBeFD
GxyIwbWhbQctUI3X72uo2tl/2m5R9jhk/qMOQQNwMiTOSKajbRE7er7+AyiEnBVD6jMkzd/MfyA3eagY

O/mPOuvV1Ipr0jgkERF0cIKdvEINszhJX+0uekkHN3XKT4PLUKfFqc7ODtrlxHueJNqofS2W7Io0cSww

BiAM+j0jnCUVf+FhR1xT6cvYxUI9XjOwacD1JlLtQn
uxO3VuWKkp+HN+Y/PTEXxubqQt4XxwKzpPcwXqiy4o/QX1Z9UzaqjehL/gxXT2HSAGTVm8wgHAYxCOqV

Y2igSJcYBM7x98tgGEq64JeajCEKw/2hdBQr/bjFmNTzqT5elNVb8gEsgNlMXDoeetEltlMF2XStqLn+

vi06x++e/I9GZ15XsB6tM/7hfO45Fhai4l85AfrbMW
sf9zjALxy7Ek6hS7zl1po92UpruV1e1JzDgfBw+BSVFNBCXByPHPRykzvkFAIpclAbYPKweqg42cukkI

E0elCx8hxbGivwGkVY5hooU9TSdNWteg5KEVZ8U9Y0KHFb5tIH1obXIgHO+3BgvFTFY4pQhE+GNDW2nm

8GdWpDru1YKedEpcop7ZknpHvaVzYp7X+8398rMhNj
NN0gnV3Htc3wqS7onLieauYp44ThZkQrWT9dmTIH5/UoSWM6mzQAvwMlqpZh2jBMCFCM9XGVGBoctS/E

9lBYV6I2t/eapYa/CIjl4SrQDWEE+6H8uLUe8H+QrWdb3rIEPgl5OjkhqReK/q91KAYe0Ng5Pm92rQE3

NHGKYibT+QaC3anPSQ/V3biLg/h46Yp85mVaCmOj4q
kVefxO9HvsEsmpJFXElAqvRmiOESEVDFYXBny5P4Vos+nuuCUPpzU1UqQxpCmYSeHL+JcnLDTz02CV3U

XcszK9lc0p4diDbQtfjHK5k4iIBgElMoiII4tPjnCNZEXa3viha+de6lbxKCNakqewwA8zVULrHfHpTD

gblW9QnUFGysBRdMM/i1JvXfTZy1i7I1Tacri6lYQ+
YYel9N3qvBZ90JsgJ0Z9mSUG9An41uU5qxWmWK9FwWQYTCrpV19VX0JD0YRVTyv328n4H/Dcp7PGySdP

GjnGZtods2r/m+jsNO1rO2nwlhU/Dh2hkNWVddsqVCyRWE0tnNcbHN879WpXJGZusaMwzME0JG6Kn3pq

afBUtsSVJiZq1s+nGixLfPPJrSiH/fZkkq77uRF1yV
UYX+azCQZaJMpsb5VmM2E2TmHREvdIitxbR9Q7xQJE62kshsoNw1+nGTbXDfPWTG+WzZS3FdCQ5LI+5g

zYUlHNxq4UdFaS/8tLAg4tKyW8v2KNEV26kxheo92UE9qI8WhiEwEkHFHir6XGb1vsG9NxLhm0AVLsmc

08m10R17u/c3qlJ8FhGURYpN1gs+vZ4mUFGZ7Kepb6
jKT4t9earnTOJ8dvw0tOULl46LL2F8PnX7BM3OFXT1Bwv6zjc4InZJGc+uK/oYJ7sZx4cBKZc3livVyl

yn4FyQEWOQNJuPhVKaOcCILOewGkQEPTSZMbPgD2i+8/u22wuF1m6H89ou/u23PP/Z7P+nenF9lFBYx3

flhmqAUo0Qy3JHwy5iRuh4/knDI4Z2ni2/8xgTL3TV
5uc1CYdzwWs+gVgoJ0M4kl7IVtSy0c2fi98W3hAghj8QKA1Szod8npU7/IlvZQ11bS0NukfflyqZSmp/

B2u5abqgQB5yJegKtskxrQpyEMgpH02yAwOjKD1o/vQdLJSkYGgE521TvACVU9RVT6hMoeb4xvtPS1Xs

puIXOSYvPca4gxBZN5lnpMH1kFl56Z5NVtE8xRkWpT
WhuPccvMqaCP/mIZu6gW+/mmF2/eSeq6q7INjz6NWtVripqyapyR1l7Kl3ldJVDPlSeQFmboIVC/hHdC

aUOMa2zeWWnFdnwkHM06IcQjZzfv5fED2ny40ovjw5KQ6wmd1w2UGzHUg+7+2LVrzaiWncn22+l/x8PE

7w9vcKeheQsGa/LpP0fDlF8moRVVpSTReVD3nlmGFV
YrB+Ws0je2ViIgt4WQtPZUeuflsa4Atl9l0C9pKydFgNGzPras3AhV2/L07B0JTnURtj3w88G261AETj

XiDsffgMl6nVuDsWGpn0ve6WVrxYRu/EOid/AgPwBTX60DsPKCdQzklYxlet3ZitSgm7WEIivU9SIFYK

/MwRaBrtw1TWOrUQzX3qaEIzrZepv83Q4FK12Kyjih
+gihuQwItcSiOPg0sEaYCgjbGZx6gdpkilEaJbboiK2PXuBzebBt59YZSeXwfPro7IZFuQi0N6idiqRd

6xMl52L5G2tY5ivTQk887meOdpl/XsnbAaTvKec8CQBj/fk0mQAZsXKuXH8QD7ewuBE0oJtz7sqvjQKb

OUkjIw/uXaSN6P6GMLi2G8rrMhFYXYr50EPZmipE+1
6ZosqUq+o8T69I9ixVCUCvcu/s5bf0NTLzf5Idfd3UhZaCkhOVUe71mmzATD7RCSaOupzscM7c+KwCgx

63Nb6a+iucIPWZO/zXC2AfVextExOOcwgMb/vQVGw116Y00+p8bhgY1g5+GYL5usEyiYliSe80CQmtuu

US8j2n8819tymWJiiQoRD/WwJ715jv454bF0E7c+yn
x8aADg4Kn6IFooe+081OD5iTqDdmLaRL3ei4kxoKmVJEOvn7MDm18EqBxS6xYgNnOuWGeWt+3fkS0Y6X

A0ORnuTFsl2EmCLzRCOiuM9Tje8XKthahuK/VgJPhPM/FANrLM9ir3APs5D68zbM9vZ1rXYMZucsBJI7

bfDh2PlxnlNASUC/1cdYnz/oPAKX8133qL0V/rL8jB
fubSMPVUSodIP4Ade1naFsuBJqOGMVCSvMwcScMf3CMBi6Gf5LZQvD8qDwhJnSCnUp+knaWAGIBHacv/

VN1IDq973FNQAonUHnnOuB1mGQAiYOZfIWKbuq8KqnUx5BY1f8DH7vdeWu/kvQP04TeXGsZ4FXv3xyFz

b3rhsSotkT3CaKyL3lZJUrfQokoxMIjsJSRmoDd1Lx
CMzVs4XS8vhrmxH3vN/p9n5tDJk//6XW8p2j6L/7a+xS6Z17JJUbfI7xdIHQIOirIbJLdEss/vJXCB/e

MpPNwOkwZTw/b2E3Ko8vHnUBx3LpYYa+O7OmeVpKA1T1Mx9vJu8Gx+2sytWvHbMoqKkpFlZf4i8lvwSw

JW8Wn/gyA4Gfet6H3JFnbuoONyLJjWrbfoaAM+y3Sd
ovM716FfKYD6tkH2KP2kyYTR9KaIzKxsTXu4X704/ed2BKlXlvkYWRibk/SACynr/ObIDfWTyV5wyKD6

tXWTpBtDorHFbcXC5+GQ8/loh039hj6uwZlWSKk3KXSQ65p7j1Oo42B1NZmBdI/r0GtadnyJERub2wks

jPgwSNzGY2YeeGi/CONRrN50xIB2yONyvWMwE6hmTM
Y8/R0nGtpNtzG9pxxBGihsrfulrxd+QgaR1L65BWtsASWc+QjUzwCDRUD+GblaSZB3aE5wGEXr7bs/Ni

DwnY0+eWkGClbqICyPc6CXpf1kI8pNFEEMxQUKfXk/2ohfG/QZXyiG3fV8JmtfQzK3rlLsBW68sTNGMQ

l3px8/5MR9OMUKyXubvmG/Oqh51TmxYfFsG0GazZvE
VxD4EMg8k1FcG/Wf+DU+RDGNgXwb9oImNdSwkSbkixdwdrJ3+Hpm4mwbLxrQ/tTrNNrXeCnYEW/hrxjR

tK1WgCszDfp9iAvLu6etMWyGh9oRO+LLwQxflbnFI2nVe80Q0+0qmtRZi9xZ7u+24Ke1F8bLQLl3Ev+5

pC8poaQr4M/oscbLylKjFC1sTGgSEcyipIInzXPEZJ
uaUcMWb3RSE1NFlqAWVUnvyZ4JUEesOIvjMObzDc7r2r3B6SidyXUvufIVjFTeG8L0KY1dwYjJkRqpwc

fI0Pn9+5Yju8drL5/Jmvs/OMsWedrVYGeMpEhBwI+hgPxX95ShdGmFppr2fqp3FAan2sroZdx0mxGG7t

72SQEKmmBg5U9bul2Y52VWNoA4d2enZ3iedagbQJfN
yipSo+PkKzb6gX5s+e+qm3gPxmuwnpRo6pyuzJYBR6lnLA4P9OC5fwMyeuWwAZNN3YnvzLZNzu96lxXa

BEg9urYfzdpmnZf4dIsTT7oBjJQ/2mlkG616IPRv+IC5ECi4sBYZoEY3MpwAYj4DemrSIYoUzcDGWSyu

tHAp5rBovC3A40b/Um+nJWBPcnkciDapfvLPPCvdIO
H0XmN/lZ1msLLSLvZxKbdsV++pU0Wg10XamT+Npo1unX1rt49qGZtzZk2/EiMQD/g4x+ByNqKScMFr+/

zMAW2c9YGT3xoYcxJZbCCk7OfJP843OMq0H+fnuxSxZWHqXh2tMVb1z+/4sAEHuaD/kHBtUc2QP83Ty3

4hofwTtGeWjl6a5wsBreB+UMJcl3X3itwPEGjHxBEQ
SLMAhNbUR/B6m1WcsIF3Qs9XqxrmqH3kaO5B641gGkTXB47h1vvgA/Coon/zc9knpI1FV8DkCc4tGf5URd

BEIh3YAG+j0mdGu1B08lPz82m3xStczF7XO3bYLsMS+3+59XTvsQsoouyoginMU5+J9P/S4shQy9zqxv

8YwymHTzDUaDoymObU9SNlYSMQABw05j+YHqR/aif+
3AdFkVyVoSgl2R225jkryvRTmvgfQ6t8WvCt5R0prXyCLTdK/0jHs7T2eknsSuA2KeWLrcozHq99/2/l

1t3mntwJg08xgf/RDfr0tcDpSK69A07m++MRV6o1Yyyz26I74Si7PShFFJYphKkPRwqHnKUzxMnAKdG1

zkpv3Jea4qZwDnH4XkSUR/KQm5SZ7sLE245Ldxx1LL
VI1J6QOTI7SlTzm6JLgNWn7OHC2ylyqSIFpt2nnX72+mSrY3JSsFr3nTFhyaSGETmvIvaGGwAME9nSMu

L/WUBQrAnX9Rr2dA1RSzKzY72uC3qBgjuCwgU9KyFJnfuFOt9j+q6UJs6EUenN1VTKRoSRsQf/TbXLei

W1AdGTWpbGAZrYqSUrU1Vt0892QhsTZEho77tZ4uwQ
QFJ1iWCeKiKGiQ9Ytg9gnRji8027nMd4LctB7alO04w3BHWLvMJ9PK7rGVFgQJQMejIVlklSULXEOj9y

81Qxx3harl/G7zB9yy7QQVlB+4RYlvZs5wKxCi/4da/pqpjuSt1pX7+/Hj/dtVm9yYwo1vJCGpr4zqcm

Ys3yFnQt+PKduePeTgL9QWY33vjKN5m1zpZqdEWDNN
Ho2JqOCgTtoJ5IcNBxBVoIjXDBhzYnIepdI5/OiezGw3hCGa5cINOYq7DDyN0d07WJSzuOS9RXPnrwe1

4bjxTLeXxjjDYDNgI/DuhoSfCYgQ0ZB7GXFbUoaxjiDfBfe1mKq4t3RFH0xRqHT7Prt/FITQoizg0KND

ZF8Ss6Sqe75snIX0JvET4joOBWx9ax5wYs+kRFozV5
6lRTTw/WcA+a9AJSHZsZWjKyeuXPkGxf7lA3Gn3vX/sFIJUeNFjjhdxCefztNVPaGiX2VSYEEIdL+0or

M9NFaG+qZsQiZzzxW8Yt6ahH+2k0CRpvq7oaP0Et/MyNwByczt/IIchiIkWKReVK7tekw6+A4LQOyv3z

MZoTet6CT+64LYv0hEb69fyqYcLjslgYdLsYv7GH8o
/IBgtBVAxhWs5ltLAf6GygcPDMHuwNaSIhszbjwXk3V6gtp35ukhFpcMgPwJSEdkW6RxVJZi2yIlQ9dr

l8UqB2GkHWbRGqWEsmpF45aS9yv0O9TacTQDTUDKhTx4pKcTpHj+swEcMui8LiMT6RoPTbOT7uVYxs17

/A7DlewYT9jf0YlS1wkkzMGLf+sSP79cbM1WwqGD6o
JNgcW3HlQMPgp2N23boRjdD7cKV0dAsyyu7FwfY/gES0P8h5JtvqVnO0JfWFlSTkxLE8lZE8GV65bJN+

gJrr1+QxR+KGyX8Hgyh8jsndyNL+a4sxc1ZotIMD2+aQ+OM0wvrT5G/HBNgzObAS8CnnIQl+4iLULDHO

Q6NNQTAx3Rvg5GZOP9ucKY2g2cu+zwdcmGmdjl+cYB
DbLi4QFOIoa+PjWrGV9kINj33Cfgoxw3mEL8T/hj2N2XPX0NAqlCYCfUo6zAduGw2W1oggdq+8+h6wsQ

pXWoS13aWifg7CnGaRj/uv0azNPas/VBJdLRBmmIDxhlKkoJxq1YOx5kQnDKhqe5smn4ESbtRDt3U+H8

tM+4/bi7pj/2UDTG6p1kYSf25ltYZQ+ZvGhnMi0xoB
aa0k96/ALv0Cv/iPaX//Upd7cBocFbfYlFT2ojN0d5UVGfT3/LzHVACINuuXwNipp1DN02dYiFqoMQbG

fi1Mixj9RP3P65aXr9/UCgK24slfMg3JE2upa/uP4TIJ5nbL5d5fA10oAzq3/FreIv5Fl0zt4XaEvSvy

3k11EuhLrvIlT/xGvbSSvkv29ypMPAlKWSuGS50HDt
GMq4AZa6HjIyLM73VgMC7OJRx4X/7Ztj3E1K7xfg7tZ9M4tFnHMhqCC5TU1+z0U0mS93/senior care+Mmd2DAY

9ezbB+E2YU83CC9rqRLAwPqCqo0AZs8d4YEV3HEwagY9IcKASR/9eVOW+Zh2Ku5O1SN0B7RfTkqqRn4n

1wqVOYVg8yWCSezKwifhalT+jQbN6VI/2Nt3i16gw/
YQ9jH2uOAz5kFLGhzJ0p7UdUHk+o7BBBg4r+5AH8e11VkEVWARf8UtT5NvAW0ET8YFJ+nIKs6H9qYSK/

JSKlmhp6KvcbW8bmpfmRcRiVtT7AsUZxQzkT7o19wLo4nF4Av0w1tvOh/bEG8b1vYNcAmFj4fF9DXVCU

bRydnqKyLutFQp71bLGics8pTLjmbWTrR82tCytebx
33Tx+LXP91+CL1VZ3Zpq6cgu9XzftsuFihZJs60MucUXVV66zWD1LcIox8TgOd/7SiSZKIze2+kkaH51

QUpFKbq6nI392X+eEl42oTRU7mzQ8AvuSKKNkjYADugRb4iVDKswgXpU4xPzoNiWUGrXSZXDxBDc2yWh

/kt+oDyYnPd05XMotYYB5TwWaTAQGdbFW9A/JsbRxo
oinCUOTuv5I7oWX8wGoK3a3QLVpEipexAm+kNrm4BSt6s5aR2nxLM2XRkm2Z/cOsbp7eI3M0pjgnQouV

+dgBWmcupxuLmnfHGVi7wv+X/R+enPZkIcfXVB3YZWS2FxJxb34bbxqK2pT2kk3Mgfna1nTxDyHTjNjn

fd2ReJcOWRwM/wfAM57KAX+e2+GyTlCHWEIGbOf5y7
5MF+XbrFyoyhxewgQyhNoQaq8tcxNdSNiZF6woJIYMs3XeqrNDBGQ4AFGRuiSofmy3/zsplh+DYhH6Ou

bjKcCBFNL5NbO1/sVwb2InNU3w3dye/SKy5tlNAlFG4JaPfLm5NFb8ZgA+YUH81e4kgR6iY8RJOyEscv

uvgjfwxhRirIKv+wfH3+9N29WllHDVGQNzDiy8AN1U
u35p3s6p34G2VKd0ssKRrhK8ukZL2CnSuZjc7RozyUCrot8dTqHmouomsAvMN9mR+SFYapt/CbdZC1XB

k2wx0LqFvOY4KsF+/7QR3ZQ9yhPwBF4PPat+62y//6nI3iwOQhR4fx71pdT7H7hQEgWN+S1Av0YqYibS

wqyiQ80ScD4gs686ugHNPN/V38Salg90GkfI4YSv6c
8jseUfb3m39SFC5yM8+urk3Z8+AgV1G6JQkLDgTtRTu4fJA/wFpEOjQoIXv6aw7UyEbfg1jWgsKM7Fxj

vyuapj/KnWRg4+HSk31E++XbAwe/xT3rIw4cnZxNjXsPkQazDcfX44PpkYc21O7fpfKxf+PVSqvoEwNw

+v6ZYRmzk52ZvOFoZY01uHteyd9npJ227CAMA2RgjD
5A5Jw3wsganmO47TA4u+ec21aib4QObqOk70vqwE8WMJg+Man03orqfIUS5lB+xUGm0P08VC7TVsDLYe

xrhydO/SblEPQjvXMvz5GCvVKc9tJVukHLfiMpKiViRiKVHqnGr1jgiML6UI7O4m8dUAq9rKppt8y9B8

SwxODshWtSGvc3og/dX98WbZZrUCjXN9B+s7ZoWnqk
HQlLNlGp8qbO6Xm6FbsIuLqnwUYoWDUjH8rVAUANyUZ0Q6rTMVWbAkkWr37ALkOaeJwawIXYp5UsGe7e

vUA4x0mGVF3yHl207ZQUPszHCQZpw5dRF306OcCGgfJmpddYtC3lweCTybtl4ZekBMpglwUARsw115/r

2Rhlz0u+ggBy5WCsnkF82pfZCQ04hvFOSUK0/Vt+NN
Cogev/PPxSLitGfxzDzd/BjYBt5p4XwiZsjosALvQLyjdM+JQPygX/WFL53auwnYreu2qOtzfIpvhubY

xbYe2wo3HDXNrD1v3rNlxAPjKDuxG62/ulPp9pa+sxYRey/9ke9CEPHT95mpDMePGXDi8RZIZr9vxaFi

HYr2DP8BzyA+D6cPa/3DLLzKy7KefIGfxQ2RHoFv+u
DkZh41sia+bnhcF2IuN1o3dBxFKysJmVjfufrL8fteMMSHFDsejc91W1liJqnkA0yMpN26st8ofGvmru

en87uQjJVuhdW64ExlFJSZ7OCP7v6GIRmcX8R6ksug+4OWeGj+pZFY8GJWGkIgs1XGLQFOxnb5b1F4rV

Cu0FlfKv26T4MYuO6USWlOocfF+Bc4n8O4xSDWDfea
5TOxF3NYYBfga8F6YQmfTCsf5i1LqbJJl+/PzC4OO50/DHK4uJCZ3FRzm2IXjYJ7+FqGyuPfFjw7HbQL

/s2zdTV/RALKaXlRjZ3n1PkPgLOXy3elu2LhCdH8Zvhw0P0LwN/5NaI7XSeUGGLFKgt9acMaLGnqxYrA

gVqt199b2FoihjGpijL+B2+26G7KcK/855JiUumlCO
MTk9DFsEoqCIROyu0k0WQSp5rDE61ffi96RKExqy4PG8ivuMTwODGWj7hv+KhAixrUtmtztFu62ccLIA

3445WRaY3T1dWV7p7gHhQOkNOMh3NvzHAOX9ri+HEu2h2msYK5PYUQBH9F8G6kZZHcmlhgDL/koTDKJW

wyKUI3O8RVinCEYSNc3M6I0byGTfqlIia+oQ1kRLT/
GRSLm1N56QuL/t/E6xZrShO7DfCG6LdYvj63uamI9gnc0cElDdX/fT4iAzKbhgMdAht1DRgW0/xsw3Op

qLTex57eIkGdZbLiS7FrHyCRjjeAS7HXih+wBaP5VU+TSZstzRNHkLd1VWhtadVWwlAun8C17MxXuIPa

d+e41bcDI11L+7oroPhMu2Nh+JUqNfxvlgfmSRq1CP
Ota5tkmyCNSoU13Lsl5od/l2FgqfPn0GfuI8Yl/+oI1Zs+ZEHjzW1arXCheFetDds/2TbUznJRF1IblN

2eKNBVU1IzEpdHy5Gs/i85xp23eNy23Ar5Pbm1IusnzNb0ReUkQwF/rgeLwYp6UmF+JbFSnKV9ewzJIa

yBlXnmLHLLMOjUQCfhy+8IWyW0f36ZxioC9WwFz7hU
SVVlpUrnFqf50TvCwpcwxtnvZkiMhJAfdrlzcCLPNRStMc0xKN8ix9E321rIoqDg1IKFCLpF2mFIWqTy

WbXnbX4DhUWrx1gAf1lYVb9QgVv1lDxd0sgF3pejHR8GidXbFluKm+JERbSN4vdIEIT0QA21vTyNSfvB

WDYFEZss/PD9MqnTHxaSoQP5Z+4uw8wbtM9FH27VK9
bxkL+jv1oRHURZ4bvnBjbX0A94kZiJIw0ffT92cvC58/HLSK4WqHCJQqgg58pIXuKNtOkTgxBGjZzIyB

7hS+ZcIyICdh7wg9JmxadR+Bwwz9SuWaYyPdDPe03BLjLicVYt2R4kHb2zCg6JiutsTYYsO67neH+07Y

eDczjSiPDAPBeXmM57OYUyJ9DLzQ5D6udfmtNET9T9
jgy+B0L9w3Imf8k3SZx7p9E+udjWwwEnOXCE14bMFRCjayyXR+XuVOX0S5b1B0Y9L6pu23nDlRkXswgh

KNV/N0l438x3N+nIFlRmnamqpjJwtmvLDd96E+7XqlBpQru0AIEX14jiw+s30lXQLXkCZDR5vbs9Za4R

jtFIw4JKs+5ZS9HyijOgSVDWXN6U+PKWOO5VuXCgEw
lIdmb+U6nbvpwUNCc9mF0KUlvXvJiv31rxdu0SGr8kp/9ekhPrfc3dv/z2LhSLVNfHKsWZXxTxdRDqQl

U2ybIthepY5dNsTClsKN6twh2z260KhpXeGOMjwdgHUvAXL/hlnOUABx0O2ghgF7KosUDEjPfsz0gHkN

TOJDAPoyAJTK5rliQZ1yJ25RgTlTD2QFuY5Ir4tGxG
0/AEGVKD9Q0DlggR5eVjyhk57YoQ9H82bsKn1Sa4E0Q6ZAZPtzZF5mh4HL7PJigA5ydLpdR3XoYmG7gP

ISxz0rYR7Q2jSBUD6EhhGjBEjRtwYGOBPvUYiORdIR+SnBsa2t0NdIDa3FmbuW6+iwMbNenGD2SL6g8n

uBAgVb2u0OHs1dGaUWsovOUedjiWMPuPcR65lorRd8
ujbuDFvGah7IJKYJUZOWiA1z/sYizx4CokwYg3OvsrFcqok56QMPlzeXj2wjkt0Cnjb9/+GxzuN/kctx

jSdFZhh6cElTHgIjnrBrXEdMieNVWb929EqdDUow+/1D7FBu4gvJ1bq/scS2JxTvL3ORgqGVZReQ4NyA

I1vt2umcNKQfVMAahRinneP5jjhkP5X3ewFcKcdi7P
ZlM4LbwrUjcz+aVdL70Xh9vCA9baCmLqM07lmINijWikL4mfE28Qq3TRY5BRbAPOXcgOlhoRFzfQJUQG

f3c/jd8vUuCEAxcgINY7nxV6n3uwsia/jT3MobFtRe/vb0RgHbdZ6p6wNUsvPzKnUM8MoVIgE7UyIsUx

gu+nyrRZrXSCj6w7AbzTQXxmAZELmcSrUUz8B4ayHs
LxGKEPF5bSHX/4X3kAf4wtO9fl5yRdpThgAkabtoWL1isxCCuh0wTw9TcCgZK3DAALS47rniZxNEDb1P

nnBbZ+8BNI6vEPaq2ZhDcl50sq+bHchOkotsfMLd13lHFk+ooKbvDwP7N/KKZIdXJByZJfttqaLA2Jh3

SoDT1zMswOpyr08N93uqjo4mIQVn8H8EUiuL1z9yJO
yeYCgFCIPJaAaI4vv2dVtwlDFSY93twKGnXKdodN3Cd6QpOggetyGxRDpz1HqfQiB243AmRKA9DV+zdb

3TqTN6oHfbjnRWJJNqdmYukY/eYby8Jzh2vcY3ea7V1/db4Ff2xrgNb+AfT0A2cb336lau9kSIhInIjk

MnGYUwQMaCXv7ntnOcUYc2DNK/BXrhZ3/KTZOg9otB
0+nfuYulbYxsP+LpWSBp9+ROQxjXj0ib2RVRCBAhd69SzOm9l5o4j/NpdJwRJiHvMQB9DojF0J2NpFEs

E+3NeYcba2m8sRDB2pEVrdVvd/jcNsocRZo+4UKRWA2dkJlSmbMILsP53lIFwFQil1Z5qYNeh88l4v+f

uULvsjZX4/sdvh8qyYj9oA/G0P0B2tBLy7mOaFgG6M
Dfq2nLvqi0mHx9gbCq0bp9BDjIvnoBslg4K1mlHtARJmeNYYEFOrattm3WlARzo8qfs8CZymx7tWQpa/

Ix3sCxNLDmOVw3lY3OQu16cz+stgBH+Kh7uOqsJ6+OY6hVnViXYUhZBPR0TLhcVLQDSrrQkSFxJ4JXNm

Wd16T5LALFNhq0dDUXfUk2ohmkIatWccHXx1LCX0H6
NrkVHWWJ88/Ln4LlqPuO+gRNmCi7JDeQr0x0eIKJSjas1e9jNjotrQqlMree8Asv8gI1VYg7UaBP4LSM

eXsrVB2rArB3CTEsnNcG2aDbmJ1PC5XKezwIomz/0gHbmCU1I39FuWk2aXgwdiJoM+twD+FZ0xcsiPEm

bAnESLfRlK7yKSVcU7RvB1XGg28AZQs2iK4mwhBZOZ
csvHEa5aq5WbpsnHfO6nUa+53O9+NOGHlEff2cjEqnKoQec2NAxTnnGfm7ajaB1/puVaCInN/rkzaoGW

H3rMa1jrHLN1TU7QVuXNj9q/bVqCc3Wci3rpd86runpV/Whu80IOzL6oR3xIbdgcFB+6l3qQjbH/fMKy

RTe4WNfTJ8tVZgvDCM3AdtiuOErSW6OTB/VWAjXXeS
hLt7pXF2ZJmk1ktPwxr1wDcSwmb3qDR7jbj/GiP2ziwBQlUQfPZJn7bBh9aIF6oX26Q6WaESf4ur1W6y

Cpn1HaHc/tAsBQTHvTsdikZlZys7F/p38CqMuO11fc5boE3pFZlOVJZXnmCPLnTqtl2DfieQgdjHX+Sv

H7ffOc96Yf+xYyB154TBqKXoeF2itTpezYyHF2+LIe
u/oO/fcBdOJeOEWa0V3tTlQy0S45LE2N1X/f5qKf4b7TEdybvSFX9F7PJJJ42OSCB0YHOBO84/AaWSnx

QHlFozXpao5I6Wxp/rllsEw3/YdxUJWiBq/jtXfjFJkZomPM2Ih2JA9rV8TkbD2lJxLuEfEJiCkrz1/7

v06tlmqieejINQQ0aj5Kzx5Pg9MgvFIZptdXNZhsEv
98e6rnMnd3Qr+A7dbmkFr/Nji8/wWFmEY6iIdS3zA1ayrR+MSCgzMJlRcQzDaeN9Qkjs8dqQXVwXstGT

tvdomlkuwB/DP8M8RDWzxJdm4E4Rr4UqRsqJIrxpxQgoyVrlWcF0WHcHFbVikkMwtWYS568Z0AcXDV5r

KQJOHXfrZ6T8JhX9FXIPbwc/pnH1qjxd0YHOyMhRZe
7N1nzN4GknniK5/MBmAlBvy39SmRl+Nk+oA5RdHn+E4vfP5GwKz7V5nuChqJa2WLmgAGnngx4tX++gZO

Qg0pxfuwk6dw5T3GX4tWKGeibFkDa4aGWQA81HJNN9r9pobHsPjTU1CEcEvhErFDV7wS4KmumSOQXKut

HiH2Gh6BjyaH9YutAoS3KZgWrKOnYO/oIb6ZO5a4ZO
BJytoToKKxbluDyLWl5g/11S1/xEStYtcOuz0QFdAI/KYrr1iOXwEEjMw0u3iTkIc2uh/o/9wONdc6IM

x04N+zLNoLYRIBxuyWEg01S4nTfRCVAkZaTIoJQ4cApR8pUWKSXhK2nzpNTwjX6Yxcl3d2hF1eelVIVU

dznmn8UG3c2c2q3xdftXqjl84y7lePCXXAdtlS6/Tf
HfvCoqk/f7UrFz2B8/HfGxqwYF76NJnOunOzO+gcK/UeoBIlszB+OhZfopkdiQWj6QYKe1Un0cQ+Bbs1

UasnkA0U/791scO/C6nKg2xOa3rqjOtJqAbgOGfxyAyVGW+M3ZjK5oRqTaT5T1x9Vl1nZ6gw50EGSK7Q

OXdM9eFhemrkZF+91X4D5qbKFNec76CPA6R9yftVs8
/GN29L+DXr1oGaNc8nWVB33c5OTjY8KoANQSRqbkq1vwUL0NFqnssfgQ+gR6KXj30TOaf4KRYFy5l2tV

LxN7f5laIZTbNd4gmETqTv039Lud4VjnkDiA6WqZS23QziKHCsrYZHKK9SIzXrNyCudpxsc1pQqq2EXi

uzlDHstjwGQe8zwl85ZT21Ly3XjftALwqECb1g6x0f
TODljHGzHnGfXm4+kgCu0K7k0WgqXgfnLHWGsF42OwIF6zE9dUHpIqtV70CFepLRai2dxzS5WN3VJRKg

/iPB//sC9nlnBq1GYGFYvYg8VnkXKk09ECU7t1H1k9+Jv4BPyzYZbdrBKYCpLcxk6Ge6A5omnki0LgOH

Lym9TPzjg2in6c5EzqNLmfnpty8V/jQMkKS+s/ceQP
ixNL5Qukhcu08gSZ8rsyMc9LGkflaL7kRGJZ6t7X4/lr3mhpzI2/0CNaZ5+SqQNskXUL7IGlN6DZbP7b

LQbxmd7Y/3a4/HMK/GGt0Mg2IsUYysZKHYxnZenYzJE/RSHHJx/D6e3FTRku0to0a6sLhzqncdFkryZO

tbqSsBWiD2ZHIQrs37PZ7wJyNChxLKwg5JDmvUvbE3
QoDbYUcoxQmZQeB/u93X0PnvFqR8ZnZF820r5mpJnI2OdApnCFs/3SkF6d6z+lZxS4HGX2v8xZcsJ4lR

kZOvNNnWU+PQNAS/SdYxK8jc9FBJOTgnXDiTjWxEc5TO9efOfCxuTr4B95hpUPpHiLy9Ju+qpyAeNCTc

hPnHDYyOlDJryjRdZAagCiYts8T+ITyj+d2HwbrVZ8
sJdn/48bYrkRTYjX8XKPgMmVIFJqLZyUGdGhi3uRsj1iLW4XIPl2V8JCrbcAdrFNCoq73a31dzYBgIZl

oDE1Nv37eUg5vyp82sz97RkPUY34ziuEMqQB+oksN9Fvz9aKL90aTbEdQU0wKAeOs1/1ycvd2xD++6qS

lBPgQ9Kl7rYF70S3B/hbNEzm2FJu729/hFs0Vcm9VM
0IPZgRdS61VX7y0wiGyRYABDMzXdNKaRvcfwLCA+tjKqck8/pM5nLpENqWJ4cD1eIVehtoLXV90Pyhei

sVTJmbeKBW2ZcK/oCda7FHKrcQYc1cRdMOLA3h64HqI38btKlL/evqk7MsijtaNE/yIFQveK37krLLYC

wjcNIWka0V2ChZpF7eUPTMqbnWoWob6Q7nqd+PSsWo
EHg+AX8b+DPUjOezt1mw81qdfmsAc3SSlwhvOIbSq3UBSsDNJybqUOtXxwruPN6eM1G5s+dzh0x21f0i

G81Pip5M8Q3dFW21Jg/YQQAik+9Z8W9Pc4tn7qZ2OMTgDalENJvs8evVju4E9qXTFrWj9/P0idsKs2TQ

tEBQNlACERHSuRrq9SS8hEtLuaDu0A3+WM/RRc9JxV
pKc8EAPks7jxru+13zOOpEKToNlP63D7+i4v47ny6DBRz8OqbBdpytLyJ66GLLs9ogV/NH65qS3ZssT8

ysMRPif9E94qpylpWgndajfqqw3MWJn+sf+8U2pj0ExpVsOmCI16ZKf7z79Be6+0aTgWYyLUJlmfFbXO

rk5QkLKwP985xePNnm9ZopVA4yh8Ev1yq3QxlDEZpv
XA1ibSS/vYG3vSiVdwlae1gHc/00PfTc741rC8Wb956N3Mb/GgKBBAhjzqZAVBAmNcYb34YJHaKWcs3h

IpjYE+SIdf4GRd3CIP5q9W34QPDNzAvPhQS45w6jiW9MPlPz0cCU1iKF9TSGRSBxC4Skq2Nf4G6Rj9dQ

zs6UUO2kCtNvZmmg7OZltKDf8L7cILsW1IjFFXZeW8
eywlJjUr7oX8ArFZPxcIyLj1GSQxgj8FOxgvJnrEGiGduTGuMYLvKWPC++XvJb5+fXn11KFPZXlPmP0D

jUrbz+CsQtJk6jfftT8vnXLAt7WWvT73huO5oni2CRtfI4JtPuswkydOnMiCgbynPhp7H/KzirbkDFkw

x5WmvsDLLR0hWPo2IgbXH5MVJ9E2Ntf1lONt1O6q1E
kmGx1ptJ7TPlaLg2z8OgkEl9X8ZqkOdRNGpnYKr1GSrC/Pgny0EqTyxixfzkG2TO/I+7QL0BeERJdqib

uzvc0S0KFJJdyGuRUTAI1yifZPR57/J/ZyIUUhPxfeBRv5IhqOD7KoG1eULUA1JU/1pDQvKq2Q162gu2

F+IF69zsfpYPL4hBJzpe+B23aq3vek5LpV88lzo7PC
cb1IrwR9Lyrs0/nJjUZDaf5o2WItZuhFNS+4+T+ORee0JYIIiH78ghYjaU5L3RSERu+yC8yx5QonlE27

ZyU6BXi4KbK/ipABFvQcCqreJi/NdZwwpoYeyRdEF7OTry69coNbXjYVdTAm6Sc5Iw4gRAQnsk2pixEc

AxmfhTiNoR/qBI8V7AEFNb1ijgyuGTgwMXQjn8D5ub
u3ft+X4u8Du63SN4vxmaQabaycwwpjMJGbf/QPuYBPpyPbpX8FEN75dGWnfJJw2ot4wDF0AgJi91laOy

JowauoNsaET+ds0RkK7VjTK5doidvK7nJ6tplDGyVnFx+brh+STHNT8gBAP/GqeKnw8qihMIz6YA/85i

qpIY6au7SSmTqr46d2hCexhxNIEm/o6nDGdStpZKjP
Na+kIwj2wdKKYvkT5zKM7YFC5m92oQikeWCN7yxTGYgW4fHed25o6AVc7puNDf4Yxa3nOJb0U2fq36bF

df25Me3C6oyiM8Suz4W4VhloibWiDnCB9uf99OmkVP2cD4qRPdHQ8OSSS/4fO+CcSQWitrG3p/8UwDel

+RzCjfCjJ8H9MV8MzdS3odYlu/IQ7FE95kqwCSbMZn
xUYRYTh5KbR/HeXHMbAbq79O9FDLvKFePMXB+JSUVhvPtcOcfU4/iW8ouR14ISw6jdkEph4DX+wpphg6

l1yKELoXqM7SWpFGfax8elwsgUzXKMomBCXZOEYg5ewAYxW5JsC+cisW7hVDJTtybkvGCDnEYytvUNIb

QcuSoaGuUWc7jQWSsyE7Y1b37WBEMGzqcC14IqGZML
CDxWm9woNwlpqvmE1F2mIV2dGzgo6TZNnXcEpl00MoWXQFDT9NGpRip1ldb+ot6tTpoIAYThAsi+1lLj

1IPilOrUVffZOuWfCTHl0ZsXJ3B5Voqz5O4B013HwS5vPqPQ39r6g9CAOEB6uT9eytilg+d+5xJL7bn5

89Kmt5p8lCrdgvinUKZsqWJsktfqemrLcqnSk+swFq
kXDD3goye/P9NI/wkx/Cs9zI7yLmUp9uCYWv+0G8s3PZjQWda6QzuUVjCNV3rq9ChX8br2foH4/V+JP4

zG6esc/vFvCn/Rrkv8Bq+vybsXba4Am7JGUht2kWRSC493ckZRCdjPrcWeKRk+NAlFN+Pu89cWwfhIFt

wOKcLA+oJ8o6DjUCyybatkOjMtWsTKO5WaXlqTXnjK
dVukEXyrhkn4IBOvxdyTWilWbE7hrCSmBGSUjhumev1XbzbEHR0whO/uaJaismzVVMgb4mu7ykI4jXSt

P+hwKnFirSH1pChFrBsHQ//s+j6rXykA7O/a+CdICNMBy838Bc+d7moAn20XhFr6MS61grV+qvyzL/bE

QdBDANZZxhbn5I2tISZ80VlWuiTdNiYyUXj8GD6Bzc
5GcVo1IMORGxOEUKaM/Jgmp236D8GnvG4cTGlsvtDfwTwHi++i9K0H0xMznyPZQkEp5H+3SrMbCJl6RL

9c1Ovl5w4pfcL5qB5lBsUbdgDbQ4OzUcWm2pUUy9/PaZR+EiEjHfiPpArpzU19mzlbmkebVFqqlA+zaT

rDSII09IL+vbnN13UWpoCMuS4pAH4dzPMpMMZfCnG+
chszJ9sWUIVluXCoNKwgM2Fdg5SCW7Jd+6cxRv1XUPb+vm3NTuQLRy1VToMU27+mG325skqzl+w+ri2M

pAVGyguokfS4zr5o6+KQ/pnnCS2HOShJaRJftUbxPvgxz2VfsmrAVvZCh2seB5NmKJDPunKQRRxXr4l7

jeag52B5Ez5kobW8NJQIgJfMpUeEAqr9kE+eJaZAmT
rcRN+uWhznMC4YYJkoeqcziGAdSRFCpZDTV9Qw8SRkn7CeToAlYq/F0aULdAGz3OtUHcbXLwuqcEKdUo

sUol8Ik+LAVSh+RmPG4G4IGHLw88IDLXtwajhidAFSKWQe7ygv97+Kl9a5OMMXdxubxOOE1pTzeMoBZr

6gFJx8l794OHUxcFiZQWylXX+l8FX63PSNUEZVoFVF
47Mj2Ndt4buVgKJ8HVzd00+kQOsriXUXSNjvTY85dQCBsqGPzrwuMA3p0db+C2nqGNV6zD14uc8L4jYS

r0uTtXROslno/avmSmJxHMn0z+k3GibFpItbKcvbSjzwT1wLLIoA7Ir1hXH3SkgV0BZw18o3gNIRlLZO

Kirk/6JiysBc6ulkn4Os7bTgWlbuekZBRMV7kI/mX+N
X7Z31anqtvdIOCDE2i+SDTzh5QuYg+UplSaFKjKieGE9/08G3/RbMAekRYknendL74hTk5bB1UdQRLzi

G1pgBQqJKXlOyyjcPfTJkiyH9OgHz+cG3Cr24IxBTI1V5j8Jz4aL+5Gks5CyH51p1GkyD8qY1j/0SQAk

7Hp5ke6wLue3FRHnEtcAM+R2XJzqOlA92LNbkfMb1A
93aWsnVNVIUCX4d0hl3UfNfAkIImiRJEkAXg2BQlPDy6IhV/EoO6FffLHXBNaka8jM2OHctFXbIWf1p6

iuV39lw6Tc2sj+O66roOGjrivnTaMWurDuktDjQ9GiYx/ZmyQ0BF/LJ10NFbk2IXidtY/LXeBNS+xa1d

qGw1Qw3sDo6+kE87FcbxtUBISTMSP6/qG+IPlQlxH0
i8/kEj/hw0YIOWWDVXMPwJeNyB4yMGM1dge0lmTGXFGLQOa8j4MNumXQVZDic4GDIznI+o99OoEZAGhA

c7IrbO11urfyT4q/gbpArM+eKV+IrcSh9UxJtBB+SEZmaIKPrvgmROytO7zvEKg6SWXs0iMDN/A/tP/0

/Bl1XwXgPuXBBtCUmU6Z/5BjuhFVMl6zhXj2xtQ1ob
k5sugbTvAevOWQNg/up7x6DXBJM+CK8ueRqjITgsqkh45fB6h8K/A5FeR9vqmaw0uU8wL50paYHoi813

qoIRJgSIvWcPyxmK5HLcNoLOefsFdz2Ygr0xZxohAfi6heInmZvfMnwkeOEeig3G/sF7yxReecbKtWDn

ROPbcH2H6TSxrlT7JYgAvH3HXiC7cKi/zw7Q54N/pQ
W5YX1ZmemOfsGK6I5vv9YWbUgXKaTJjQEoUL1ra4Bsx7vzFg+iuV+3939nyccmuIhlDC6LIqCkawQKeX

MyeXDA/u5saR5TtZ2Y42y0Ui9hhGbC+u7alRpWYY+tk4KrF3zY/w9f42ToGY6i85KGFgQl9wFxQk3jGG

dqb5GoQqCAxWRqLb09AXyrJlTtLIqCBWji7/P0ZuT0
O5tbeih5Mk4OWnsj+McCzUIzhxTHkrHXmljxn7+MnlwYf7ugA97bFImHdDyv33wnVPnFhKVYPqV5oe6j

QBhXVtLDm74MpcqKqnR4tj1cxw9jh3o+gnxJiDmuKO32hF4CXVb+tqcuCEBi8kmKgWzlIjpQ/yvUcuxa

lge7IXrrNY+3yJr1vktxnVFQKiTBmWQF09rDKRme8Z
n3kgTVk6GibfMoEXC/LsvEPIIqVmIpLj8QJDCj+zNan2w0ddGlYduiC9uZPwqwHUI+cq3hl1JgoI/6+T

NrlUQaY29mKPqlcczV2ACAWH68DPRSxdfig7YuBninQtHsFk7c+hiznWyZjzg1FcBB8h2IDvGCvbPJXz

2MmmVxWKTCh3F8TTg8iTYoxdgUI1Fpsp+/mHQIMEaD
S26athC3Gt4McVUni8D6djiIZbCySjaLhXptGKo23RL6OBTdBkL072DSJ2xcC+Vy3CDi9AsGhtarPT7i

ol2GtGwGv95+EZyrVV7O28Bf8vU93EdKkDfiqdXct1HGUj6/rpb24wTFugopLHTLV0EFNwt4tMvEo/7a

Srfi8JzRBcER18xz+oeEhcwveM179vh/bVMskK/baZ
HHkyZAXqEg4BZjqjBonMLH1f5/ryVsCPRn4cnK4mL6aI7u3MTRMFH/dIyylgMUvZjYGyIOPbFd6sP4ON

NhVtPOSzQMgMUjlagOOkMNw1FuNsUKTKHYBmwBZZLfWvBK7lxTdD7pqZYGTZFk4rzWxyk0Xggi8Isu7k

Kduq750NdIhwOPtGO/HWrpTdcQWf70TY7Qd3VPShyV
wD5E/iut9+WiIVf/+b48ugJnBpuEmElcFq+II7QwQGisETSQe0f/F/3O3G4PKavl3pOPDPXl4Q8WPGyD

ko4g8i5r5m0t4DfnZhPlmkRcWHvONdVWB1whFN3OlCC4ItYQ9HGcOln+v0Dp4qt1ber3b73OHH5XUsez

8rXwKTGAnKBgxehBD8pzp9ABrRtAsl9SsfzsEqBVKo
6ujUvcZKXkRQPhCKbRXVf3GXucun122TnmEw4z6V12Ma8xbC7UdkGMqNPFNdAwY3HKwFP96pGG1QVrLr

pk4QvzSeD7I0LIVTHpvum1FneRT0mmBxiKihQZXil7U5htq5Uqev7Nm0ve9tTave2xqON8z0jCPhC3zy

RPVjKQWXU3xYsiuHyH1LtybAxamRNXPKMn+tkn1XaT
4W5AjzYxCs8gMgG1p9lf5xT9PgLaKfmLtAwpGajZN2k96PYM7auVMc+pcqA2l24ffVcJJ+oAveAw3NY0

+a72p6hP0kD+LHHY/JSgpBCJBeiMv83yvbzSRguUG5RqCONB5xqDX+UXo6CasVCodRl889/WpzTtn7ww

8DlmIDfWePzHID3a0yn2Bx7lRyQi8vFUvl1LOe0gJ+
edulAyuSDepEYrlLwAAZTZ/kLxlVwRpDr5uD7yS8tjB3WGOvxzKyA3KhbhnVvVT5bGF4yjm3ujTWFe9B

dbXO8Pam987vcnr7CLS1px6aAeIxT0xfrGpN4kZETwtKKTrBAJgZ7CZatpIAEi8Cqr0W+FPd9DtIVTDn

iVU4O5HOiMB5r+SVpN36nDUEQNwO4vkZBQMZ1Ej6Ng
2xQkJCyuhO6UhLtvMqnNjxJsbqPLySGKCV+8qAtjghFN8Rvskbla3ncbGZfq4CFieghFzc7kluU21PtI

YwHnNjeVEgIRCkVCwpjZxiQ2Gbm9/d1SxcYC4kdkLrPXuklC3TdyJPTRaOtK+Oj58Gcq6yTid9Q037qt

mkKln0hDGNxZJtLlSvE7/skwpiioO7mwgazEVF1BBi
/vARpRJ2+tah8lALT2fbWBFkHSMn5rTyIdESDfBtarFsRGZw4IWb3wxNdQyrDymTlvxMr5Hy2U8Pq2na

nIKF+/mV0w3LnmTk3fCqNo3/KT0E9RwriumeivRkhwW2KpILjbFzkVmGPuMxr9ivvXTw+qi+Y1yQLlUY

hPU1TPV7kwHJaHEi5SwpYBZuGMV3tuQ11f463ja2HH
7u8kQYdY0/RU4ktC1bPt/BVtjduj5I+bhIHWEDA+/WC4TOgWLhhJNrwdg1+W9cgUPnsYxirA9eAT9UGc

Gmcyc/vhyOOi29rq9BODVcwWH3/fr6NB7lRp7QwIUv1zjnlo2nHTcL6hsAW5pgRegApk/Navin+6nwfL6N

sNUnJlM5pAwHfrqYnR+INqpn8bpET9lMYe9FWgoKRP
Mjwd1V1VzI/O2o66ruiqyidzmcA4nW3+7KKHPmAF5NdXyAzvkUw0IkMXVrBPhbzqMp3vYt4ZtwbOTU6y

i7kmcebIWTCgLx0+cWm8tFkKwXVz7cGP5jBZLPS+VAVZ9HrjKRNHZ+F26waDMsQRCqHTrQaUgkXf2yAc

+uSRb9rEnc40UpH/EKJPJoCp/7abr8dXHba2Eydeu+
9A//AsUOPIvn9X+tNWWNFU6K5ACUSZIs5gplwRs1/PHHiSGFcATzW6Fa0T0gf8rCFRRsSFTL6vTM6laQ

mvXhm8mN4BQnTK4chZJhb3SBmaOTRg7bJ2/2tOW/fefVRr+yd9M/er9LZHq9TuqAMfaYn+/S9t81i8mh

cZ3AbxDQRfRCFJN10xNCjCwASzHxAhseBi3UkVn1vU
SBm+ZP1xMX3IrAbc/Yfucqx0xDNaKtzB29xferAuue+0uOv/Vezoe6IqVQytGmbDDDLJTqVSHoMNVFGI

erxs0y2Irspdan4nDYtTukpz0stuElAWgQ+vql7VMCV0QPVlcwgFSG2EzKe1xJ8rs1YQR/43d7tWu1oj

X+gBdef10+cFKLL6RxuE9xZaXqmyjTHmhZtlRsuoL7
frK5OdvsuLA0pASr+YQmlmoM13rUQN2v9dqkAb18D4VuJm7ofcuq5iOlbvXs6rEBQOFhbxwwmAT0Kn0f

quF7frMlJ2Azedc7FRwG0IUjeBhNQnGO8TJ+ENdPasxe/45Iv1ObAI6Xd+WAOsP/ryq7wmM9526KnubJ

66x1fMFYQjSGlJ+PtIhq6WpQo65YA6yqJkVwdfqr1U
coN5WXhRfWuXSUs6jeu1dwHnak+uKrkLiskxAt17+fMfH/9EH6BNax20NtYplmf8/4NN1IthZGksxgTb

sNyU6UJ8VNGt7dskcgEovHr17XKLueBgXrCAt/APON2lKW4SuORtMcMJF9+mf6F3ifa67+MPumxfpUlV

i+30OnJDhpgh8YD4uaA82J0SDdrJB5QNTDE3ekbG/v
+zPQo4g+jCQHIKklJesUSwlyAPVbbJImkVypfO5DdojnL+oILFI5INMKUJp3ZzandN+3bEx0YukCks6P

3RQknkPQqAlJF1mRgc+efxrradvpZrXQUd9a6/qbqJTmSoyiwaScMpL+xMMNgiQtOO4SRULwet/JGW/K

OvpoiTk+9rKZ2z9blcwienX76AfChHPsru/X+KWBQi
7hOceFrJP5V9lD1yNKZEeG9dT651kqgHmQ48ZcIdY6ZpyNqkBRuvHGz7S98cvq6btrZQQdX5+HxllEl4

pg9sPL84HFfXtjMxWmtStmUoImZ0YSPYNWmF+bnes7zmDiwOgunLz2MbhOqwD/zRSPEUHh0hgzj+rFVL

8FQr0oA/uK3NaGwqszvr1WnmIJmafQN9k1TfRml2jO
6UGwe8Ytsk40UwnkO8vq8vb9tO23T/Lena+n4YtJNLoPECPzZ0onVlOd01UQjsgm0j7VT+rVeDE7KD56L

0iHNX9zG2R/JlU6JvHdB7KFUzKFlktxPj8h2RtdCW9fo7Y8E/VwxcXi1nzvauxWTA+CIgaCTkHWiIozf

2qOcLZVP+dKq7SXqRTWNjJx82ZYBpECCYvRnO0M1as
CQtdvZxEvEXtjWevsBuLgM4yCcR4ZzPsonyg0NgQXXUSNljuhTifqR/NRrtSwJ2ePdH57QlFJyPCmxOo

2j3sLjKanJ/KAWze6Auf8v1+QByZIJwraMiCZi3WiecNjrTFOxqcK20Wla1hh16ggg+7DVc80puyquZm

7pjTb1Q+BJGlivWQ2X1g4Bj12imHTAnhpUC5gqa5Is
YD7xQ93/NlAHEyejq41O71AFuFm+7Hl+lJ1EjwZyD42KrlcSnSFFqFOCM+NwHb7+xt7l8OLi0Nr0Mk7v

P+vGlR82KyG4sHyZeoyEmfsPf2Q8T/89w4T09V+3UuwUeuExYeSXu0c4K81s6n+XyJII8P/8o1sGGx6Q

cXQZ4Bi//6aC/xtPm8/6mEEWCGpTn0o+ue640Nli29
DHhSg17LSvnK988sd3FdAbV3/IFPbE8SQyUBrKrm0vCpij+ci5SAkTNvXdNJBuH3x0IEd7NzKen0vA6B

3rhccBNNz0KNrEKRvXKDaZ0HlAl1N2SUnUFIyBcK1IVM6HmxE9VbqkyEJZ8baQoe1hu4dOxDfbvhECZj

x1tt5sxMbkPQMVyGmROZ5XHa8NjH90iFJYxKg3SHU/
se/TbPpCxxgY09fl0cVjSPsPI5V4GE/FlccbZfW9t3WKQ97wwdNECar9hHYeqV9MFPeieBmwRiGPzzca

Dgs3ThkQTGouzl5rcBqUL2oyQ4B0N+Pq8IQZWRyQhbVjxoL3mGBz1AcC1kujYX76R9VXL3iPgEHbtl6i

4nUHFcGa+3QvKvZ81GiGsFP8kt0gp5R9mCfavlxqXG
vqapTCNWD7mZeBbN6E93C792ud2PS4DqAYbXOmHNnWZ4MdKXKGZcgFTm4pgDdoK+VsrHNQibPyyJrO99

54OpLtIGcpjiVp4nNCpoppNf9sXFHaG8QuVPGxoXMETe1EcUclgfbeNOVWVVsXaPPh8K05d5SabA2cq9

Aek//F3Ft/RRX+/k1isJfOOb3TWYt6vxVNjbHZAy6M
u9y1mZVADSTCqzO0rLPKZOGSYHTf3TmBr+79b7Lm8k19dxTHS1c+Cf6yj883R11j8064DXgLEefIPRXG

N68XWhE4LvYTDsOuYTqRRN1v3y19pcjgPJE6ciYf99LgZjD22SRH6X5l16M6KGwzXjm4HFHHF1uNytfx

XDSHEXc6HRtuDlp8CiGqW65bfNJKqFmFXKNV0Eyax/
PNIyPII6bD2lSoPk0srXIdsRZ50rTIf4fw0LBQSdQ6LM3ZakQvNsiakO2Sd3Zj1Gj7TdLIZBnzVKIDyE

MJx+mMRUn8ghMCb4hIcdI0LsC8UJi+mrdPgOD67+kxM15YUoBNzUtZTMIGQbLq6IYyf3lsaJMa/vt8B1

ZDX2sfDzY0T2Y23Emk37GjQ5hh20PuNBYHE+KNve2U
0xbzII5C2545OVhLfV1kyHss6kRwq4qIQBDH6lh5NWuA0YDdqRXbpClN1YPfIMnOsQJJUqnlsZbZm6My

yNHWuMvq9wk/jjFSxrROEHzHhl8jJHZysY25WGcVh2wpGz2Di1q4zxpozkLae+F3nRCDA3rBA8ERKmxR

ZPgekNlYX1sXto0By7dWfh/ueafdyB7Sr+pUXe0B1A
6+muvYJAZo2YqW3zA8nHsgxj60OZO8aIFE8XBTddXHBKpOydp2LsRv54SzOqKcoLxDt2L7DnFmNA093F

S1Nl1zsYwa6EN6l7v8lpHwwOK8ty4duoe+8VkTvTNfzvq8iKbNvnnINJ1w09z5sAAvOTmdVk+znirTdG

1vD6uPnjqGbJfYg4uN4HnyIUH9rqP+tRepNYQOHXP+
xxC4qFi5RfR4pXaY0038zxaelfIlBoQqvRjthyg22GlmTRgDP3+h603GaW6Nb4Upqdxjj8zdDhq3CbQq

JNm2k4k/Euz7fzy4YG5kYZRUdYrbkd/jAEq1yyOVhOz5gGmUCGmL2aDV2tbhGY6bp5BemR/6EF16IdDF

mf4wmbTqzf0ddLDA+TTjzi79gdz41xz23DZhGdxUSj
hUAcEWz5g1Ux3QB+nxuZuuD2qO1g0eIxW6vhWc68gVXC6clug+l7syyrUTc09najPUNylaPl4pC/j7Sc

6ppymoyHrlgN9CqcP+t+oY82lioBM/m8y0OUNTG6FrDmPn+Vxk6ivjQZZtc4guP83seJuJ58AITW9DCn

3RkWNsBKiJfukR3zqkHzIHpNNN4eqji6BMUmgR/tZ1
n8GiU37h0eH5P5oQK94VA+rwVfUZxPoAN165h9KvE6oOq5h17QDb7FqQofPoBC2htaKREaPvmSusIeP7

n+p0XIuch28fF6KEVk36KaWlygClMDqZDBA17384LqngrhmvlybIX2Po1w1WaZdLF2PtS6LiUkbthtHb

JMEr1OA2uJd8xkbT0xl8L7gg+DDUpG3ddjnJtvmSmM
l9PReljsfiBeqDMvZZldEW8D8MYhwZA1R7UquZSghvLeLey23yb3JiyJMHVRCSuJ1DogA2k7l3jou7mO

shmMB0sHxPhsTlKXYbEX61AlfBnImC93HKTrnhOIJRc9ZU0nmqXCq8GSrj3CPnPidPBvfNQH1iECt5Nv

jbNXIOiAI1umSCob2em6kfrdALPfNOm98IS8zbE13l
H/AuLPRa/3D0ucKJlufp4fxkYKtAIQfdLUGSgSOSEi0IR9xWmg8/X3oSrKH3mjEQ4/Sd2KaqB686ZV5T

FV2+UuNIvpxtUqz2g+zXp9Cd697OtpTmS7tYKW+HumrKYxvV9fuwlKU2k7krjspDpeqSOMn7FXRuUBBb

WBY1uc0eQwEOeCKap0ajAvW4V/B9jZo7d7jDUPzFvc
qXu6YBrxpiIxzr6SAkpIAFW2bjT6IkYteVogF8hiD0rpw4EWXN2s9cqXGk0lONrpXVeC2ahxhuYztisF

XPmAv6zLKlFZFQ3z6vSeil71bs52BrXZvuCTYXhSG1RXzTkzHE9v6HbraXAurRGWBEFNaYivvtsJtrri

LgF6aHwi3JgcG1Je5aC+7LVBgv+wIDuQWokf0drvOM
LwHD0welaEFO6t8ddS7UYKmF93cGNPMSO60fBGmg5Z/lVn8Pm2gLqN6FKygULmczUXNKQ3no0zkAKNOs

jUBixtMnzSVcXE+jb1+5+Fkwjlml5cTsRTjEiNbaPZH4FXZRA0RmL9c1QX6uaYINth3QMSJgYRei6WYa

i10YjzKAsSv5I5x85pbPbBVNpWrZI2Yx9liIaACFnB
SNjeyxZE35EK85FTPxcdBT7DrVzAw3LSDVWRk8OutKBBFylaaDUQYNUOIJTo2GpIJ4aSC6lIzU3utVeh

zCvWACR0eyJ/esLZ5mOOSrb1SdJVf8YGVCgqzw/Ea/GRkpWLBk/jusCEnlChfzrcd3R+YTLUsrrGhE7A

UJsw9biYteVDGVDUav7Ms00i/zNPvzMPmIyCIprpNS
1N0scMaL9pTP94/VWTH8yODfSvMq/u56DiTSWNl8FeXdnoZyFqwh+Z8NbjJyZnuLJFYNZxWWqIVTK2t5

skOJ+ilaXx4JV1h/4h2Ms+O6FIFULGYpFVB50AMvUe+9uSdMcD7oJ4hU7+AVRXpzZ/8I/pgiSRuDnVpV

GqSPGdqfLKXrHW7PpFuvh6lU5PG9S2wW0Awf+xZzcy
KLNvdJy+4vmtQNFcse3Kml1Ha7rxiZxSC1r5+OeThAiLB54FPwEgK6HV/MzpT5JpeuWnqYLqztDZYeNj

8md5YVtIFmo0r40c0n2gQI5qObX5IfkqtjblvnJQ7drWdDdl+N75YmjVAr1Mkigo0JA1bZgnm90yy7cj

pCPQA4iYykJlu/SS1QyBkMCyfF2pltVRFSS5doHx0p
uULp/WWdW28jz/PFwysoKoFVx+xM5AOGT3fbeEU718lHKRWys2t2yPKkcfdV4fdOuE3PrbptDEy1DmMg

4YRAAf4D0PbJA1mBlberZA/PokYnPYsU48k7MCW3gGGnwhlLaJRvzPI6d48AM6OhcQxRI7BDAnnwLRx7

5tNifqfEKiVj4NcsmJoT+VTxpUTenJ+OBw3FQuoj2/
rze1XkhQtaln4c2RqVWT++hSRS8ytcxLZT8SQqk301Zf7rC8R5aaS3rCRsqZ1mz55Rd9SQ9XwuC2848o

oPjXH3ndu2AK4UuXeshAqrUN9SHCy8gr02mz2qrneC7Q20QcrPxiAOUdJFJV8833nGcTF8uYOdgqUMaB

X/qyL92sWVSR2g9qRBr5IGwUHnrgxl/7rLrh2cfYRi
ay8a2jfIAfp278+GW28/oLaEQh3sskUaRzbUddg0EDs+FS/JkDMvbBdIpoa8hz3ZoO3M9aPBsacHYXgQ

O04oWr04OQkcDN6V0U+2qSgKVoyIOxPIL3SMmAJzYt30N7TtTdya+FQY/BeS2rqM8YRECAYH6O287x7i

iK0RWuIEj0r9ohz58Elf47PRWtGw8xkYfruinyOj/T
mg/bEd+/5Pb9f36PwLsb1wtVRheSTkvxlRmLtzH+pFYgjP2DTi8KzWFZ2Vt9VgTvnBS+IwtvgERThanT

o5W5C+yqA62QKdIWkeC1bdjeP2GnzqzUgJvZlJhB1e9VqOJx3Gl3ayJq4kiX54Yu5adQI6tOgO8omisL

6VF3faKGcWfR6WgIwtfledz9npx5alB1bi9mogRQfd
Ll6IXacirPgAips61r7Ir/xZ7TGDQzbrc/ClH+ItDIA6wSW6gCRdF0ZwOFObLajV7vmlhhraSv+902gL

W44F+bnLjjU7MC1gQGkCmJ3QpgL7aQXIAf9bcoTSWZsl/hBClOmi5Dr/B8A/KYCTGo2JnBVbFG4irIIY

7dBQlbYpYcWal7Hzj52js1h7Q/q+Hv1myZKMEzCUzx
+yiTZTAJDO9yegadzwT8RecYnCdkzO4ibHd73TiMMBfnggQPKcFZS70p+oELgMtlUE51Mv6j7YoCl9it

jOcQBXufzZq4wD/f/W0lr9/7djIsG3hQOU+jxOihfrFf2Znwd2/wi8dRUbNJsFkNEDwS0TznQ+feKmUR

4DCCGHkqYmwuc+eANLsi4OqwDKRoJIpNwcjRAPGy1T
iqSb3QcwjuXu6xc3RgPkkCFMhNI+Il3JtmjNQWXxtS2ZUbF5yZXEgWlf3lQwq9AsFesERTVIocRYG8dh

stJedXML7K8lwDhPKDy/KvHoxlOGspakYfR9NFDIp91cF3BVA4JZYVnZeESiSz+JnHNbyyVh+V2vDIBh

/+QR29PeiC/wmDTbe51BBrACUdqs6CvssqdA13rXUs
yDl9kVzVBZhYpbo1vC0JnIygnC3Sr71Dd0n78aTHFlm9YNvOFH53431q7fxo4fd3kWlddiKdPPgEIjBV

N2nLG9m96IWuwU4sCfbLhzQ68F+3y8ushmM9mxvagaY5ODjZz+Tmf3DvyEiN3cosUGNwZ/rzXVQBTCA2

x6jnSu0Q+yDrMNXaB5h2j9fxl6NSGeWz5Ivq5lG4S0
QLZQGzMEfuFtjAExhAQ7cfGL5FeORxt9FYXlMFIMid98WXbVJjReCOafgUHMgP6E5jl0WH/Ht1K6GygK

Rne3xWk1HZZdeYBVA0pmH03FWtWba/2Hnebv3oPNvVl+ytHeiVdZP83Y8Z8zZ10DHJTULaFXRFsKmc1W

k6IlOp6o5uXla4mXXXtXtqoPT7Ge9w0B2sw2i5zu6s
/GO6vWi6snFWJWhOu9cJHm3bbzETcaq3yF3lJlCt+2b1wowqHXx5P6d/Sr1rf6fz9goMZhiSD+f9ZS0b

OkY/D77Aet+FOun1uyvCVavTJgWiy9NcaoX6BVLdNyvlk+LTx63bzp7k03hcnxkgrgGIG5+HXHHgWy8K

Q3mw92BEqFJRAi+FGj36Ss3QD5XBYV44bS0+NMIxSB
qfVc71IZufbt6Tt4A3/0/4u2hAv33K0vRdhZJQXBWVJ0+r/dGBa103OdIBYNbYe5Va9pHtbwFlZ3NYqS

nr7Tv47BjNdWhvurZLd0Q0AVWZBUl3KEW6FPkG/amn2jOiUlNF16nuOz5+j9/sonmmQ3Ag2wiA1Ksezf

Ol/U1IVdvXUE8sFJmtExWDZqeuEQ+NLsh3J3WVa4Gn
l0YKBGtRHoFeu8jYX8h8OteMfg5X8rDodOXaN4sLiiNGff4wCSYVMEnRp0Zbu5r7O5ufK8KYrXpxiXAo

wlPvRbtKsgMUOoddKaGbJ0Px6Moymnh6ymSLic2GMSkM0wx0JJZRG8aab3kBHkPyuKFlCc2zGIeE7pA0

J8jXBzgBPxy41WLKmdA2XhJT9ezSENOMHEeOaZn4Ap
iz41L+/XC65kZxNZthJieCNNEbijqpyDGvnl/+zULbj7zppDIInkGRP/WrAsP1aZzGcuvItNjg5Ghsfc

9j9pwPuCLVEacekizCWksGo/0La27AMzKbeV3RFteG0yLe6e2WDExcD5acvUU7+nGaU/Re/kLDYsXRYc

BH/q2fW2pWgzDdHJyK5bK8e2nefZR9k2skM2qsbxbl
S5ud1+fBEc+KmK6orM9AI57kV1yZzE6Bs1ciR0YWpH/+qutjRtXZcuZUVeTEkO2WAVkPLga3uaTsvwWC

IPWdqeDhha2lOggKKOIe85g0D+pCUIRnYGC9X0xOuknqDhnfDcckkd1tvkrZYKgsEJjk/3zY9/rofa0t

T/ym+PQSjkYR2nOJginXvUeL1pSw4ESvHBtzmKEM4j
3rtzD7uGdxgzpyZXsffxdyGbuUCAnGsI+ZVtItLfi/4bWKB6xJcHfc0VuUzUx9HJq0R/p1UJqxpeKfgg

kdXDM4N5CuCxtHuxQb4TkViLxlZ9It+OpQpFM8Dqwd7Rsdv4r2tryEXiW79+F/3+rMYixN5qFC2qIHEk

dCBabdCrpq8ccaWdgVwFPn1ZZc4tdBQ2j9N0b7cSyj
6MbEs5BYobaKPd8Zbbcdu3r0X2B5nlGr04M8cAsqVSGgpszrd9fdd+G6UysZsTxgHsCBg7lQsldWUlOu

d3AbgIrO3/zOkv/Pq00q9Rjhtp9UqGA5Yuo07sjPu11p8ki23Db7QFiGKi2LXFQN65166edwdU49hqoR

QMy4kC/N0JaJUhuQDRvjV9vDcTBoSElkG1FwOwXzOQ
3LKQMwKuISnfp9Aflxq9Q2DdH//ZuBd11KzFT6ox8dLBcuk5UTZHzKKGGtgnWnvEno3jcpEHz0lX2ia4

W0esrQwQMHzwrq/BQdHzQG17oW+3VVfMvZGF6EemthV/Wwp1vXHRQfE2UIfIIHeh6PGAPj+A/1iGd0/g

0QB29kRoUadTWFJ5G9nXOJTmqyWxNwYEdlR/Ziwo3R
D8ozazd7B9JQQSZ1r1ZZvZyCmQNeEsiYg8wruYClxezQqWDJqQmjjvqDfV1kikwovBjdubGmSEZ56kx5

/mrOhB88jfjyGQO28Xj4NHkjcZr5rtWZ0BGPd3OW9b5dEZKzGISucNqHthXjSCjsUpBwJKkEp1q9PxpP

c8altP8oKo0L3vWBt9UIu3qdcz0VdI+Hk1rDtOdvsS
jset+6gDQ40KoD+/hfpWeidXsvOSi3VrZk4OFmPYOgVE61Tbvvivp7IAgdkzjtRWcgx+4QjveUBQyI4U

zNnzD759SiqRxOniiQ76KAdFi1VIzhI5dn+FiSx5kwzgoOfk0c90R0gy6Lo3PmqfINrT+PvMsn+jO9Sm

exmwJjiNeciJg7SKnH3i+FjPnMvwH/auNi3EpVEgxF
SWjLVZDpa4k5HuukAmZFab6D6W/9iSDjjHd2Le2OGXcv0ecwuz/jhs3tNPw/Znzk9NeJh/rbeX8k2p7k

deSMl0oQcaX+wv7QC6vumg0lrfNwh/zG0T+vYj9qWURIFsvDOT9CU4tNr6ILEfn10ecHrTtlk0CSVUu/

kPMPUJqCha1vBWGhiAHwziRJDMSodFjsk1xMyb8LVF
wNAbOIPB0aEMuq8JUemmQeRpDuALJUAyhlA/nJbeVtd3l9xr8WueAqFzZ0SLawUkjIHk27ZGgzqW3snF

rHJaJ8bUQUphw5f8uyL6A8w5scgH2fkAEhSzNwHiKwDlcXuGpZ9VdEk0s3kGCzREM/eRY4rwA4DFYQ2r

1ZmRzw1htjQ0IslbqFu6gtfgta7SaNeqlYjvH3VifP
WcLPDCZZ9t3rj8anQv8VlNlMRzUqTySn4t1zliIoplfx8LhuMVB+JBbypCOG8N54/uXiQ4vOPtZ6wQ37

npN+u19aMO279yul+WQtXZQc3gsqqt/u0byL3O/S4dTRWlhRrbCd1XuQmYFhRml35WZQuK99VDI5s8kJ

ULPmQTmxyYmC8D4NaOIUwKY9v+T5GzyKyzhclrVOxq
2eBWvXv+psBkeXVUjASco0eTkevsCoKAkElG9xeMYGMvoH/q3xeGt/Yz2Eyvu75pK/hOW93xpgZnJ3fJ

4IQCTzRhUzM6g3tgGyZVipg5WBm7dyrFBQFWEciX8kQlIZMXYpg3hKtx2/uilZQNdsbZgtir3JlB/FLU

xhTQyKdefuVFaFT1Fir4gvhgrqQsV9WAoRmnzBnO4K
yxYKBmPCJ3CJZXfcYLuYaroA46kp6JbouViVX+fMeYlX63zKOZw3OXaa/8Y0CcBixJXb8x5jlb6tdPX/

UJ2qyNE68axknr9N9EkIQI1FZzx0BaUl63saOVNBC/8vs140XWihj50ifQLl7EnwoWxUUibwhT6a4adL

8MnRSoF+FN5m0Z+lHCDglkEcGN+FBA1jYojHKDyAMA
Y/1CU0P/6hkPF9eu4L1BEeeLpBMSWoHkdlmsVBpNwUobyPokF+7MqaliWICMbekL81fv6q0cM609treo

3vZzfQ2xsq2KEfC/Wwp7lWv+L2Co6wRs2jcBp8/HbNpJMe4sYCXCj9LHF8t593b5Am602IcgJgiT5AwN

Fex2pzeB0o/HtdGslQfnRSHjtrkwg9985ueGnNQr99
TScnVtsW0GoF899G7bN56SSDaUfj+Nmx+I0ZX4py8+YA/W1UYlIsme/4IS6aokm9A/sypMA+CxOSu/mE

po766T7cEyI4tGewK8LqfkroRt0RCMZnO3aOxIcteDwIijrJhdJQtHfzJlkDAhg9cR3xT0NnsSuKymNq

0puGc6sOt7jjZRlwAGC9c2BkZ8rFWIdjZWJjtAMKAe
L70Yo/K+zQnlRT1jLGesPyOhxI8uBBS0JDc13QWNwUSTZ2SspwjvN+ckOElNb7BPTwGMyhg/FGUMHxiT

Te6S9mEI/65toc6RR2zv2vrRpsRb4uzW1eyrizoEA3lHvzct26SKAHlZYqcxfTK9E58xkKvxzXMenRSP

eAik6myTJsr503OgV5Oi1cxh+CVnP7jonSrmDuEME8
vWv3k5qvqPC1OkCWAza9OCV8RuhVoEFgsK8binV94LjQ1zbolBeG4CQFfnF1nCMqFUUUChejrYoA4oaG

/v+13vyDKeTT5kOhZJcW8nW94cplrSxl7URlngABFn5QfhBaVMDATSw1756/xAIN5PIjD3PA8mbjwyUi

RXzy8dNhUJ98y0UQXNW1OrKuMYpS+DRWFWLIOw5ryE
KNC3Xs+5u2dkbqg9cNebJiBfXPEfp2lXdowwDU6ompf2UyDu9y52gazNeIiWsiqKfj/t+y23CjLzsvcJ

uyLKUH9NF4W276FfYjG6uWpbauXoDrqARc74vd7rEs0A2Gt6cH/sMLVameMTZqYksUoItjfuDvAkLdzM

BnzPsvn7ZWo36gKiGtOSGJv/ZZb5d55muHV44XAQv9
7oOZEUg271PdVCvJCVsNBwQroq7yjrvHYk0Mau9l9FhLPLfkVczqDJ5+Z+7gS/7DFZoTFuOAXTMdX3MW

MTWjKxVJGeamK+7nPpPLe+A/Jr9LPFcKrZpJFnu8V74oUUZAdOQw9iFhhGEE/dHOeJmt8ABAbeDLwvnh

pKQdReqj6bedDDrq56K8oYr7zgCUPCOuxJNr7SaJPh
E2npW3xMamH0tVK1h+zSLpE7uMJzv0eJSWhBHlcc88OvaXFez+tFe8g9TCuIXCvegpS/Y47PmyQ4h0/8

muOxf5HZWRjSlQD2I17MWNX9boVfsaZanYbJqGLCpnPlD7JMC7yl/HumeJElURY5THhThvfIzV97lRir

8vG+DkD1sTR/IBxG3AHuuCHBCGy7XOlYjsQPaspEER
RhmRCOLF4POWK03OH3BFipdruS2B2dTxH+R7scOxACABRV9aW4s9rFMsq5c5Wo7eK7U1tejOs+4IAbRj

R45+LJEFT1xCj3e/Cc5uI1gjoJT0XDpPIUL7jGywEN4kKEu7QiG5j7pzAe80fYMz3KwR8xFpgiGu5oSq

Q+BBJcbHY+3JdR32b2/d0SWaDT4SYVB+1U8y+SK/jM
KCkMprVrck1npO+MdFL9pe+jafd1MV6RsSaeDCuB6YSnlwSzgbG+109fHtL9dy44y6/gnVoCMoC9LB0l

KeccOt9KxgpD7fnMe/Vv/6v+C6z/yS98kFcZ5HfTjmKGOD22bJwGHQurDBKsrv4R0b2+fkAx3UY10AM1

V+iC4xyLoRAeqHaEMm6d39QSSHB4boUqFxqgW6gINB
D3xd8h+MIhLkd+LfEZL0WRW9zmPQnCpLouLcVVGXG8C7Txlv6H/nxo4pZAe8QJEbrs+jjEYP2PARlWmB

QG0+HJI6h7Mw0UbBxOFOVk5cu9mtV868mbA+cHPb/LR6Tk3RUgTI4ZQ9Vab/W5Zpjkp60ZkLdUZ7pB7s

KHNHo5r9lgkPZ4zM5K8J7+VsIg0P5EA8dOaUHzadXp
tlP0CeDe4ftGYOqKcoytHL4Wb9Tc1rk9lT3G5Tqtl3UEv+KC4yv5sjlYRcvUn83GkcqRlgPzOWHAQ+n+

hZB6GdVTmV9LO09WZMoegW/OwVMuCvqz1i7PH0zSTy6xTEIUXy3Axatq7jA/PFwWlQZOLxA7Ef2xXAKN

gyxaKPMHXGSeGpPIw2/FGvVA2XvpxCgyrpP8izCtOV
CUOaVA5YdMg/+B7BX75NEP8o++kO5xqV5I4XcpZkeaysnbeRapn6NjYcGHGTWPJwFMKtLoRx5WV9QrHO

nsUESWpUJqqfz9U8v7TSumpeKY0R0ztRkpM7bMyTIFK/7UW0boWhBQH1AJVYeS+x9thVBm5xHXzKBI5s

/pLNoMC3Nxjri3iWJMT9uJpU6qDbu5psPbW3RGCr5d
1o8b4z+hbTPvf1/bbMXiSU0+AcogYhlHhltWJ5d4GAck9hE3qZjxgqbMbiZ3TVXmZPKiVmbdwuRbpPuU

9+ojwVDeoAvdWFbnYIFErRTw2CIwLC4DQpXDf/VKyJ4x/gwfwIio2KuEPdv2uLEcAW2Ha/mZoqsxtOU+

dV/IAUNiGKNDSFYwvBjG1uemdfLw2Ief5c222aQZ9y
3yjQZ9WSXRhzOMDVcle+ZdP+tV/AOeX7nfvGaMsIXlb9KpvfBUtjLbnUi4mE69SX1yS2niDVJUSOM7Nr

CtaNPcrIMoxZH34+Dorvd2svVx1no2zR9BVTECKqC9LcZTFUbnsUULm1ZkqrNme5GmwB6WmR/EPZjQaJ

KKpTB9ZR4T1jnNLokIGGlCK+MqGtYe6IOQTlpXM8cL
c13zfmMLaM4hPLzqhDiNrEc+F5YFPwI97W3lo4Bjvr//1gC9XEYpx2XUa+f3WpExKGiVPAqh5c7hibeg

RJxI538alO35vuW+o5VMMuTcLLHGTxK5bfd2Xfm9LT8GeZQ2L/bgLzYK7W1PiJcWPP11nLGwK9EZrS/K

aYsvHpw0NUi/kfwmYhmKTRpv/iuWR0eCzFvXwprEDw
2hzdOxEyA4lH1QwLZpgZSsSHvO/PnXLytLy0I6Dq73AZPfVk1FDJ6xG+o7a9jxPYXTG1PwQtgWGUzulZ

AotjpxTDoWM7a03k9ZVaGubF1k2x7EY9Uk200/CSexRhQd78qyOkW83Ow6CjnJLAZRTFCYfGbobTU8RD

DZtlKKDz7amDLn1AOs7Qy7o+cDPCw9nZAatK/jtF+L
+mpcpXuyVa2TfpNXxtc7tC43TocWgq85ldYdQLIte91JCXBnK8aWY3cmKuWN9tMNUva2fq4nPJript99

cl/KWkhcB8aziBv9L3BrxRaxV99ohpVNjfbi4ZLxIh2nIZq1lR00GD/7GZXY6XgqAeBfDRE+/6CTlXqc

oNmL9aFkW6anmi2t5y8ioMcCxtpy8comWXiTUdoa+T
8/c8vNYJG2Nkxy6oD3YFT0jh2uEGE6B6MEP1suvyPvlSHku9d05WTfN9rz5tEfeB/oFu51DeyJObqSrW

nCS4DBo6OelRNs1cs7V2gHNPWGkvEhEJ9NgmQpp6uYZZIGsgPeo6bQTaAIxQCneAkeBseiYrtYADcdC4

frO25RAv7yIzx8fdgOKDqvYbG/owVl+oBOClHeyURy
ECWcYpHdUkDSPhOpStJiuobuXG9Tf4HFj8Lryf/8MQbou74xPfav8G4XvTS8t61pTk/9qMze02wfagWZ

WoKBJYfJfl2bjZ4EEFOJ+mvDGAIew2v7N5oc2nAp/Ybc60h6y47IQgSD36uK+aitTQ7ze2gfmtzkmD8z

jS8JYV00S5qtYjcjVLrS9lw2J1OAOMM3YRNim0mon7
3Z7NHBXO7xi92dHY9AH0buRr7L/UoH6HzWzQBTTe7E4WtLKKLMeH7HoNd78W5kIuBXtYxjBfwye4VGn/

Le+q0eoQv+HvbSqPAPxSoxM0ilKtjYsGXQnTyz1thtnwugnihsGI1SUduwCUv7lW28zvONussPUrTEHC

WmMDUG5q0uAfNkvu2OO3fx3KL1I1lWmiwQtQ/ZmJOt
XiZHz2XWj0OiBzlDwM+5ORBpJ5hWmSc+HFDP3fvMqyi/vDVKxTDSXxHdVo38zmN1jJHBrk9cUw7+76RH

Ir9irn1mm2mEv5x0Yel98TTKHOZHj4FL3kkey3HKIudIx7BGWr+obY2uaBguf8+1rGMpakHvNJnBFcvA

0SfwRKORngkPtTUJst9dz+X0UNJJffnOabSGfjHh6C
25Y9pSE9deh2r6yihW6WQ6poJl/U3xVTA1w8zFFiYdjs/+R2N37gCAiYAwkkH/iWraJZr01Uu4krel6a

zkd/of/6sC57SAgBHWe/TMpcw+bgtpudLUUEaPJN24wpflNBZS5Pp6GYlIwCIc+uuA3kRYb7K/kRV/qz

3G1RkiurUuoeOs2wx1jBGEgzBb15p9TdsmWSmSH7a7
Jv9pGFIlNWYC+EJlZd6xAPJQs+VI+5qHbHgjj9EOYzHQFnV7o0fiI5jop2mtuF+4PWqSMsPjS1S26LeI

x8xu4Yp3mzqiq+pRSP1585ho6NXshN6RJr49bAUhF96syEnwDQ1rRtC5XnYmxx1YJSlAJmW7+IRBH6k0

x69n22dSjTN0qzSu3j4UzcbxHikhzgv3xLizIrpKp9
lpUZy78CWY29Fa9PZgPneN76GekE03dMDhsO3L3QZWAyw/MFaL7V9TTS85knO+T6VZ68u8nc43FP5MjQ

q1dhjk7AUyQqNKq0QXqm8whROhM6nCE2YLnITPmu0ujX8DVCg6aiokDwT6Joty6KQeptGiBy7w86u8xu

UH1tmJM7vnvbZ8dqdSPCmYiwtyknXf+8bhJiKzDFj4
WUatLpRQIkvCAQd1VL+zdIPzNM726L+rIeyEk/00tgO4NxBsj56WC0hOXo8Zoh6G3gW5N7fFsTya/2n2

JYG8pmI8xXr5La1t6PQb7HPZxTePQ5KaA4JRXtoibMjDiep9qC4lFYh1cRhhKrynAvFVUmpPigjC+UT1

S00MId7eIdUqgfTV2S8X3G+urTpmxlO7zeZHoP7tGK
DPzLOmU2xoNbEaprbpIOruH9/oFhveCc8z6nMmSTepYZAyzFwev69bbir9UeNk55W5FTiqGDb0HxiwFy

Nd8vDa6GPWLX8vM3uUuP3M7/jlTeY40nyt68SrnV5cKQBPz33VB/Cyr5x2HAzZKG4l6HFCSvQv+Gv8jJ

RWYI/eLUfKQjuJMB1IpagZk1U2o+nTVf0EWslVJY5E
i5Rt2qzkPFyaimqjEYwkPNgtA2zpyldPfnsLyjS9tVhek3ThF4dX193Tvxv67MZD4c7Rwfe3BBU99zF+

H/Aand+kspZb7jU21riw9MN/jwN4DY3aLMZHnxQL8ABOOVPavKiGdQP6mTxegFIhGOrVpL3faJm2e80m

/GKEatdudjB8o4URpuRMxfrZmrddEQ/hQieGtYVNak
z7//RQ3n4fo3Im27xczJPWAUHkn3teaL1aX/HuMrvGAsLGJslYt8KezLtUmtjgUdTBw71kmaFM1XNeLP

aUr9Y61/TAImfd/zJKu63/iVjYGJfQfUqwDPGXu/7M4OR2FV4sQU7OehJ8cU+kJSeU87S5cVz8y8AkQQ

vIB2EwJYsjrMO3dAJqBwX3Wl5zfbfqWzP3dzcd5VVM
5SK7AdiU4Tr7X4Q5xsA9si+GAHdsLCy7V0AjYHS10FRezRWOz/fPqm24cncVJvTH1ebvshCFXm+0QvTs

ZD4NLbP5YHk7VLEStLeR5dqNYtPiaIEicFVICPBIyG1H3JH6WMrEjeGDoMh4jTZH0FxxDzeEwFYLGD0x

Sf3whOrD0XK5cX4FDsCWM8aS8zeomJULFD5ZuzXGVL
1oROd7RRY0xWwEBr0b2kmBgJ3syeCRPqZlDu5wYO3N9yulJKuc99vdG+bVSNEW9a8o9hXrcHNjswVCLH

VpPrisWChk4gL0lGdSdXJf1ecRUIHmYobDUT6qedXV/h7D1j/BQLVshFuv+Tgi5Lv8AnRzbgqKMoG1SE

96PzNDuuTQgeAXf+eA+T2awed4OhCfQXFkUwH45enn
AkLmN8Sq4LMJKaKTaD8/zneyJN0P8o6npKN1Y8oDstBZFqQvUZFAQVw2gCUvhIAflAVrmS6M6IYfeBFt

OXRyBRJp44+uEXm7/G6Yigko2p+Ulr+W9Cp87UW4E4skI5gw/uH5SUDrK5zarlyZYTNrn7KdMMl/dgg0

wBP14FBA3tlYnxMueV7fs7VyhqUMeurS4dTp1NH+4t
wO+npflAHJKO1yuvfb08sUZAk2VHJ4UUqvQ4sLo5BczyomXV41Wiysd4vOpyXLRW1j0BKYGpGDBN8xw2

jSn/zweEmVm/zBqSC49CquE2C6AhQS1qYUbW6w1zA0tsToZ+XwBIF1htd/nyz66b5oltxL2oF2fY93cL

TFwbb+td2hXX2df2+WRFGOoLIist1w/BfCLM15ym7o
iSHHWWNBnqQoqZfZxSRL7ywCBrbZpldKaEHZdYdEoRbID7KrP18gCa+vep0/+vYbVsRB+Cyw/3N28aBX

bMm9Alh1qmb18MkRrEbuqPlqJ3DY40BCgx1CQXo417sGcHLcCe9UMqog/ufxIolllJ6ABvx6oXwv9jYh

G/O5qta9Nauu2i5mREj8ca+rfc/d7XXN6Idoi0PfS/
vjXejLdxiKciGVcH+n1MjVpvhmUHry+lv2YZd3p16bAKD+AFNzc55BgQFJJ8L64Qc27bNVFabqwRHJS/

w+1qR5iKtcbarWp17/2cG3qhqhSzSS5oeKvY98Tmsl8hFbITdc8ebq+sabNEs25Q1cyzVUlJ4dx28UOe

xztS2QxxIdlmL40yVObNlAgAc337gUtyvAemekuBUv
4EEkb80U1NJA2PMJJNpS3H1b5jiJzo3VvU+0aW8kCTNcoDx18Bg47Cn8Vnxz380EJfyMY+k7GZLKSCoK

O72QLTO6o3IHSP3XvPWvOmmOfaJNEysmEpcc+zjxLAPIl4rrmu7KFaxKTdmai23BVXJtrG240b8n6424

J6wB918WyVmccq/D1bATZ8DIzWgLutmjTelLiLJEVm
25K7I2A9yzCk+nOwMmELeVAQEMEWNnlhzEUl9L3aqy5uuKUpe+Hr7zCygqZjufy1+pRH5HEc2OxX0qmi

NHIOSCE6rYbYGbZn+1G+r/IyG15PqRdDU8nDmmed97ZaU7FPBBaczv8Ay9H485JpZxmqC1PCjjXfTEYg

+HurHcG0hEHDc6+3Yu3ETVjxscEPlC81YY3A6zZ6sI
tWeeZ7SlczOQa18s29HKtem/F+CDlX63ba0L5gMulmYO51OHmeL5AeCVC7syKheG0PhkyJiuv9f3bJYv

7+VnuxCyI6RiXkhNN90/a/YG1U9TeaZx51CSjrjHtZpAednE1eKFhbQRgMsjJ97+AT8oFYDAgS0S9L55

ebiOYP6cDicMkXZp1uiAL9ARkdH1kc+j+XBxLFp3cy
I2RC2lAoBWz+yY/rc7+ivN62uuULotq2c+htEZO34JD+qJB3vBh83K4Js0FhfkouHXY2CkBJbSSi/cil

6lFTtIxpXRY0hKs9g6/r/krJi+n6IGAMcGnm41ed0TADaa5jEW/T43A6QF8Qi3COmV0Gqqkv6KzGKhnT

VZLZ0AeNsqAyPBXdIWT89fO18fw5jmpv+ExwadA46a
k4r2Wh6p+TynTO7rgp/wlviV+OSo7hXrLnLFjZJg1X4FtURl9AhLf1tAABEU1VzghWfHg106XAdIUaet

t8Wxp5tFf4comrq9c8gIsMQ9NE+xSi/93VQlqWLf+i4am9Cg888LA1Ygl0mveIiCYuMwM0BmtkrshYb8

8khs07r30T/xUSawEiIhQd98e3t1bAHxlGvl10Cx3H
uSaXc+S7nOsuUqR/GhR0DyxvjJiPFIRF8wqDFA7AqlayNk4J+PGcnDd5w7ZeEyAeg9z+hmwuGTf5lOQv

uSnBdJsUZAVUuLzc91dqdMabbu4XahyABWARBlB16j07gx3+3Vvrj6o0a+i/n1GiLLW2MASfhERGa+yt

EEe0zXZU9p5KGoeu7KMv/tj7eSdIfNw/J3mDSCDlQj
cb2Q19z6Igh+HUp6MhQ5J/kNZmqZNN+8MPeyG/kwGWKsQ5+PjLl545qRf46w0R/Fv/yUA4E9YOyOP3no

zSlXKW/0ZPsVsUbgcZf79BfkzSJT6vmuxHpC4rNpV6Ok6zhPVWAzZMv/NjJ8v87VsZNC4jLGlMCk3uPO

PyRyE/MYbKZokLFsduWBD8a4Ot7+Msg9JX/w5acx6G
z7yF4GHGyDMzsVKfX706JVQ/Is0diTKFdOaqGkYidBV/3yUphLunDXusmM/sT5gikFeq7l1tPKfqYlJZ

J5P/+NR3qyAEDKR/Ai4fyN8auy6dxNEReVu4w+XGEigCnbCRmhMhCuu0jjvAV6wh66ZpFrvXhQZgHRiX

9YWY4s92T3KFbU7v3Z7Y0TBVG9fGBUobxEon3VWGlz
JFRxiu8voOdKsb/33CJeDK47pxK/BdZVkrxn4GAq2RuVproDjYhR38r1p8uRPK5B4KzYdDfBm6uMI/Nn

NremAEk5kkqNkOyHiNwlg/IeedbAi1uYFeCqYaLd+Ed2bala6g2ELJoZUL9mPGMIXoQAfZBVnR/9Fv40

62Dk0lduB5CC7IzxSW+xN+ddCUOlwiXBNnb/whg+N/
3IfBDua9VrESRGwUeyi6hk4VbzIeqvGvf2tgogvkGDYZF5V5KFuEZltLNbHxX/6DILp9acq+x0j2f7cL

qNHRtX+DWPOBkrMK3A1CP90ScKwE0/cZnmdasO6qmosZzLpb0eWJAgTW2sjRzVSzgwRkcTswgpmN3kRu

7kzTe7NnjGfjhNCZq038P8ekm0xvMeZ43xgABwukpD
9lbNyGHPghwN2X8+rg7T358wTad34A2+GLNxCRYFDkY9juvq4/u//CklX8MmpyNg8l+sJIGkCJ7xUkqY

X8MW+NMQLsEssbbyo3WxrL+sG+kEUwFChVmX6vvpq2WDGnVhUitWIwfzIdEkVWsjx9LTz34cSZwVY1pB

2O41oWllMNP2ilWwY4TqTeG9vsDr5AOp6AhPh15wzS
g3PU9YgI+Gt1ZubwDx6d0dAI0RoJde44lGu7v23xBbe35dTRmmucQlWuocN398Bk/H3gHZk7u1v/gDdu

nrlt7qZuuCTUyVB5Tvrt0RPtpMOLl0lkFQ9afsIu3/JOWDpsjvcvLwJhNSLxtEMf3D+iTjYlhWxnPql/

/IPDKT8JiBEpvwku31fdULGVzDs/ZfRLtk/P7V5/u1
7BV9EG7qh/cb/AbWQw7QDSiAJCtdPOkMVhxL/j0yKWF0LEt+66xOYqeYGa9cJOF18eFTtBW5XjgU1Yds

lFapawAs1ePytA4stll1npVmK0zA+daxaL0GKjteOAkgOz9x/4j8jiPfzRPWxQzEYRIxrLapEfD8y2B1

/UdhssqOkEBVmKK5MzXmBvwHQDsSh4okW6wyzl7Ad3
eGlB6o+FwAnU4bBwq+okp9EFKUZnrkzuozZUGJl2TVTEykC5RGfUFuDOxse055Bvi8c4HXNI5xcY1z4K

9a3/eTJNnaVPU50JK9ZczqNdDL7u4IHuob8PX2bqWJrPU1ZY5oWRdJeGZ/FUfCCZGdTQNteem1bhGBaK

tQpARPHfqMYTDn8D/hxgxRS+0aPzi/bWXIPKqrsDH7
KI7f9pvRFNdtY3zyfwAE5YXwQLpIzJ1nV9LxPakvMYDBRKvCJJv/4PqCRx6NGwEDMmISeoWI8IBA/1Kn

+ulM8PHJKjVpAahX8PV5nvP4jSjjefw+pucfJNs/RGoVTQU2NQ9MMddb+VB5wYVEAckFHF/WQn6hvYyW

m0uqdB0SyW6CXqjzMwWHDSVundoavGvf/rcaPFc9yD
d5GuIXJPiyYeoXwUJxU11zLlXAnkVCzqvlhD+ILAkffai2e5d9BIPCT4ZZaH4GA8N7GvDMy0hYJ44Y2S

4jXPNA+k2znGIdXjRKW+/gTh1V3T9OaifJCTFYuDg2cBwXlbOc2DWdCC4ONFcPqpyFU6i/HlOZkRQeV7

vNniufwjATx9BrfNQggPdKGFzBwV5+CjXZzNWCkX8a
Yyyfb5HagFMpOVakrhjq73E465qmalKJj+JUhGxH2xnlYJIhdg6NIJ96Km6KbyYdOCRr2Gi3HOaRAtyR

5+RsvLDXA5irZjgbHotdQCXPusCbn3Na6jFH9POX61qOAJ8NiWoHaIujKmhJDKgAHiEX+Rsvt9k3C+sO

2KLggdTjNIKuR4KRyrGBkIk0WVM2LlTr5uj+bFM0E7
IOKJLg87UaW5uQQKLK6tiSgBY20n8P5U5DwYX9wz7VGCkke6dA9ueKh7YjrceuN1tQJntZeofzRzRYtn

[place]oon+XL9GaFpqJlqNbEDxWgsQ4Yg+mUNkWGVuhiqYKm71XDdZpGtbtjX1Va8+BZNxx8uUNA6kwFp1gkgJz

QSfcsvhatzP5QTK5cdmthSB6wAy9r7e3C86s/7mL1r
8xGgBik27Bz4DbhtY8EkJNr4oPf2CIkc4lBOJT6qgkTB7thfxfQEkhhyq7ePpW2t2t2wQ608pXn0d8+E

ioySRBPO0OHZ1/eXT1iSfoUx04lHU6aFuaAI9ePqhkbE/Wb6HjWNL5cTZ+Yj16FWYgnXWDvvBE8nal9z

X12w/calcy2OwjB4Pg87AM1Ny09acd171KrUQapXUP
FFSXon6qjX9pzCoKR9lw1vAv9S5XdJMNv2MOanCtLgeild0UpgzetC66/87r2pvEaqfh9+BGu58A50LK

VjJXOGiW23D1XyILkiNwlQTAifrc1UqM69AgQH326DIH2+6f9h7gpmIvA75wSwCIe+EP5kQr72i+oIeM

4NGgnSjM05IgaJUGxbqCNZueb7wi6pFv8kkoYNkY/Y
9PBt89WfnRO9Srr+9DwcrrBvo7QkgF7bE8nsxHRe4QRBPmIcHkUXoApBmNpFp2iK8v/HTR//mr5NcKb0

99EZjIZ7r2OOBEAgyv7ISMkfHIltwgIMZe1Jh/SYUXndrstMDas12gG9ygQD2rhD5U/08LVCD0yzTtgF

rZhXva5AlhHjwQ12WWPegBlFXfWwCxIi3/yEf0nXjo
zjJQqqTipFP8Y85pp8wXfIGpALZuSjQYWBQb/gh/h24tqPgQJkf2W95iM/DkvzlnNkueXdNcNRQPLp9W

nlQ+0dwCxOz21IVpupMTEkfZG67BzBdS1cNpF6G9Zfa/QEzLVFa/JGnb/2mFnabKaI6r7mONbvMdXHqP

qYtUdYJdVoTdhVHfh3vF+vwKvxWcVzWgrMM3y3gpSV
f5oblBmKpxV4TRKNiEkEmRTO0HSe5ms5N7cfenHgcurOYvl4MEkKAewwiGzZXJyFOLDf9BjzI9rHubVt

lPeZRKpBg8nuZVJkDdhFKhfy7djv+OFJ2/YzW/DxWUepgU7lnHDwKSRMMviKkkCQY52s4ISN/SWf4Bcg

GEI4Xnl3WwZpXBkvWuJty58U5LOSrdL87Q+VDb3Kn0
vViTU1g0p9RWpZI1S7NroZgC1kJZZpAmpkUY5BnuxuXFGYThDsGoXIKM8OnVVhCq/z+Lxze13E1w3gCl

M4Nc4ou9To5j5uX+BX5uK9D5VRGyqPiGUBKQcVsjOgQiCLxAtsN7yhtScfYjnLDcFmn7+q8/kURMqZx+

OsPd3LS1ufBqghfMKeHIrcbVrEgf+9mUrnbLPbCQO/
bLXPbJJIFo6LAwpw/A/7zN1JbiZPvqV7xaiDiF1aR8CZd4RvuTZAV/JOSE+Ea+fRgtw8E/eTjtU1a6VQw

PheVrk9+Wo59eTCt+45vsnAdD72rckRwbIw+QDLuJTwESj3ybAvX3uf7ZaXgGvAl2U/Us1+hf42ZoiUD

JfjcgiFH1vbEMpGb1IHB8xJq7bG8XIw83sg6C9uRml
+uFOmu8KLIk8s3mc9jnq5V3V10g61fJuH4+4C7MxiVDl2gXwm+7MVfhGEp+ovJfwBXmXvtqij/HG9j9P

ILoXB0gyho2bQazhoDyWNfzAVtyu3+vFXSQv30VrQROtLWoq+xPzb46hwuMiFFetYG17hhDj3vjQ98xU

yaJoKo01ZIc/egqlzyNrwQ8Ovln52LRm0QircmXfGp
3ofcrzq//ltAjD6OJ9Yzixf3Q1FKdN8JzKFgwUCpoK65vxheKi7+Kikc5c27nnePDwpp50b/xQlraUec

J8U/j9A0b8KOqj/k/IoNQeqvb5m0tRuHhC9c99jVOyHDdRkqtbAOyBuqHuOPvDT1HROuS4vXNkVoUwdk

TB5ktIEY6ttJNWw6Udr/4iXxXOe8gJo2PPwKqiBevL
LfI5F2Wh7Vx4YfPd2LzP/kFYUhYkBqH+KSBiLbizFdwG3yGmcs6WcFwP4ZWFrch1rrAX+NTH9wJrJ/t6

KN325tqqg7fgnc0AcgkeskiG2lHo/HArbOZQiNVu/xFb2XjcQ+5c0EAAz7zKor0QCnHBdAxNPrAUnRbG

U8/c+eQxKiRe8Q2x8xjvT+LhD45vrOQS0bma29irYQ
SRVQqaJbSl/6QolCgNA7OWS7cdokgM5sYaDAiCstMojpKnQTJ7dfuIR5Z3pbI/567qzXYiz8E9J3oe9D

E0xixxYJEa/o2GvDDbzV16s/nw+E65L8hY5UWjLHwNHJFb4BUJmQzGm76aqUhtv6NYBFnb3lgXmNDZ5t

caAwl5j1ADu6ItbTQzYPT3oBP+iPMMXAgj6GAtAd6m
hbbkwoXlVv9Yq75LitnbMbQMDCUnIXV5hm5vatVa4cdLNZ/bF9RHvFSanBjmpypbG8P7NVchW22d/Yulisa

lCM+ChDE/66dDt/Pk+a6n5lMDUgYz2VnoJiJx7zC7O1ZkVVl8rtb95Jkz+3+G97CZ0jWZjaqUZcWlAPA

1K9TquR+veLsDQWCFZzaf2McSVA74KBnTK/dABSU4v
6rYrO3cNj/WnUeBoVPzUuGAFsWnHDr2OQapimMvtWoDkMhOGOiWsI5OBb7No6+o1jeUIvrAXeyb56sgR

F/epDlgP+IJBvJ0uZrYPMTi4JirswWoIiUVNWbrFJSsqRRsLINLSY5fGNeKDx7kATNF0wKVug9sXGo5/

Zrp8ClgnoC3/x35r1xOTrena2E03zHm4LhkWpM0EhM
LQMc+dFzWq2Kh32+Rfqo5BlrH9oZ8e1UtJ0dDtPoJrnAtG42qnbBu9NwHivofqHE3orgdzaeVDmho7Q/

ntcNqqpgaHsb4JAvJB45V8lXWt79r257EnmdrhX9MkQYH4tK//opcMl8rEeNMsrua4DeHzEh1FgFR1PD

9rlISpAI8ZlpKIlMIRGEOIBo2N8CIrLJGpMEuUrBvD
ky11m8t0NotCIJqF9+h2myjC0udC5GSDQseKOifyuYExlgQoyI4Wr8X94SOvdimgaKNzSPLO4EHOBh7v

tTiLn43VXmsAmehkNYakavZn6pYpsgztQWd4Tg+LjAy+oqiymefNcn08HPsn7dUgD7Zl5h0pUC2zhp/E

t0x52389/R4pmqjqG2nSwW9VmmuPUq0TplHXGW8YS3
8eygBFGqW2r4FGJNaFB7f3ASje0nQmEJq3omBcLGxuG/CbU0b5EGy9AWg3Sx11lu7TId5v2NfAHugrM9

On7Muk8v3qHZJ3wYCLd8aHZlMBnlBjCdjcZaDYjXeOZqYwW93UtIJmM5MAET4+XhQ5yw223H393fQCo+

Skum3cfqnWBtewOimDenDLvJ0xZutIwB9NdS+gft8J
GsSLUuC9BWoRBid1Hao67AL+FrqzQjL4+2snkq8ubPxeEkFJk1dtZZTbql72IaXQ1PIUQCYtb7vRHhha

by1dZwuV++sEIu9IjKIXvLuNGiXbj3zwCYMSmc5ozx/yAvTK+PFbblEoU5q0uXj0k8ahrFyPbltcEtw9

7ctDdYrEtl1gHLlzua82/hoaGL6n+2t06DB+TUf2j2
OrnHa3jkznOlc6j5cWB5YvwZjGJ7KmB4pTMTZHfo3YWtiKvdiiLQyEEo74PxqqlxtcdnhgW+McspBTKS

JnXzI4WtzQHb+P7qX2Rt5Yxf50EZSdT8oYy/KB6O8kZMNw+zxg9uMOWZgmeP4HcXZB9xoy7dyKtoM0Go

K8O8IPfh9hjsPU0DY9hnkjwHtDSSGVdAC+f7P08//x
xTSNAJKWAco1f2lzabxDxoinQ7N/RFEnqy7RTy4tBYLUHYzvV4nwltvCDGO+WD6IerM8BQqGWjc4e2xe

dwoOHD+rbdbG2lWnk+GMqUkCawpyB19sfqU0AN9XGSB9TQqJEMy3SuGpR7+Yad6nksbv7m0VY++1kiSs

r55keUWB+XqrNfHTooiz9v/jPkqRcKwIzGCpNbeMzK
+5rECmUqF16EdJoUkvh7XW4f4MKJQkFsCVsAEnuDo23Sc+oggp8T0mT3JxEhf6hMzxGSYrFEyzb2tzMC

nVStcCjkYRvsHUVFfVaDAOxhdvVKci6KZNFRSgOFCA03B+hrPWeXFeXO/4yJeupM6gx7fqG/+zhLXCNL

7cl1JW+x7NpJpjDrLTbUscHCXUzSXtiWSYKHeXxCWU
UxlFgHA6k4OswVQ6XJFyndHfKh8nz5OycbAoL4itRsr/td5B3k/72aP+o08XcE1VZhd2KQWyvVnQPqAI

8xUdoaMlgTDIGpSPIe80sPGi1YPRM30Lo+h/EnOhI5AdXtLdcjpAbcFme042KWVP4cFZNr/sekySr3rZ

DMsCkv1KvnYveUslHqSQ+wXz9RBZDI9Gqb+1odIqHx
+3lODjXEADw2rlDjTjKrVdFI+xSslG7bUGzuQSIT45zG3J3k0qs4m8YMr4Vgd8tt2M2sQO/CmpAy0BV6

FcZjiIVAcM+5lgkvOa8o9PFz4MUFhkigfyNRRopoQK2mKA2CippZr9XIWlrwuZ7RjSSbaxrnZVA96uUS

Ba7FojvpRE2OQVnau4HLoFzb9Hwgs8Z//4hjBORhSZ
lqSuKQvIqjD8EYy4zLjdaQmNmtRvUz+ksmXBP0s4gCna34sXJ0LhsOxY3KsBuHVQAgLdS/WjM9XmCslT

7Zrvg8RXUbZhazfmP4hBUNybAFO4YNr40hUndC3QvjZ2ukfN6tMm9NZ7Z0XNzg8g/qpxwKDLn93eOMjI

XabFyanAR4ArIPs8YKIq8w6rt79SdV1PqELFSBzeWb
cM5I+UxeA77ECvD80ftHluD3994xMa7bavUtuZhvWwRmiwqeB9FhslVfPXWbgV278p63XhvuMGlHg4G7

oeQ4zzqqJf8Ke0WxQ57MDcoMYicc6QdMLq4GAZ63AtmJQbAMlcrFDD6lZKaYOtGUaQpXjcvQkk6cCbTH

LPOJ0PEmHAzd9mYCaP9orolVfgbWmM8KYyimGsHXPC
UsAMGbl88+HiZ0tWKAp/q7Bb4X/p/b7cGkqir94FmfRVeutq8IVVHmf1eiI6sVubtIUKyd+50sT+5BEh

86dYxTUOvkuX1Poc6QacnJ5SHjJNuryu8crH+lpIMLnWK7dkGWJW+NhJougUnW79b4TVZfHKaCijGSAc

V9lIzLpj7SnZt2p2UgKoJZpnjhHXmLtaX27GOmdeCj
lO05z4Zw5EiPFkz37ILx7gmwfQ+vK1/Jg3upcB3jYcW6RFTUl1wa7ahwysYOkL1V9i88XPI0XfTYGCSL

TlrDPjZ8akwgszbtbFhYb//U91fSJ/VBQ24TsHIN+piY6EwqUS/W38yCTtHbFEMf+5crfmMzUrxRypTK

CHE9scABwxVPg1mSmAtfcfCyYBhX1DmJMTAY4qLmSu
yUR+Uo9o4WnQE5bq/458+cQv4DzcKx846O5jKn5Is+ObojVmcrcCa6CfU+idLZlbtnw6ujsrnQYT4x5O

jV4rdCxWZX+TEpNuL99EEUlusOrFlIcqt3kTSxkUp3q+vkZYEg2tFPi5wZJCA/7vo56MhqR+wA/CuyXa

7A9zwTBoW//EZs6SekF7xZ6Z6kyI+rA2pI3qRsud5Y
XsEVu/go7t1WoodYFp/yj3goaaRDejlUV2WJ8Nz/t/x4sxn0/j3/wNaofmrAlw1Y8N6LpAuM1c2DooJ8

ldc3P/K44tFGXNXnSIyQOs52CO2cK45SmMQlDv2ITsbmt5QcjgsgYEx4fc9Cfhb0y1PkO/5elMMb46Yx

0n+hd7bG9/wTUh1ni5IoWV6Kmg4cSCxh3nPy4WTJZH
agM9mNtNnMxt03RZZ2pCULIg5huTea4JMiw3Ntxfgv7qjYwoetsa8UghvYruklZ5ST0IMuKHDx2Km26I

IwCyjBNLyjpyzfVXDEVqCu7SvIinHMCg8/45teTv6ZVyO0oAK6+e7CGKhA/FfxGEa3lDARxi1H2vA/fk

sKCHokTLswXAh/qJ/HaUbVx8eIRC/jXlmj3JKaxpyI
Vx8+dK61LNo4zeJHajvQNx/OtLJam44jjZgHIRSrXbcybZL2ry/k67V/z3FFYgJkX7BfPilwnxEjNa+w

dyhEoZmaSoWyoIr74uHdGGRJTpBwY4kc4d6lVvEWw+PPpyqKcYGQhU14y72zTfjnrDLeTN+rRe7r2Fz6

YtjYoWqkFUnWtMaOavENRDY8YEv24NwVo+NyNHvj0B
DUlEAasR8xv0wO+qw44wlJAGhY3gZktCavq3fP82seYzacFIGTEHGw0oalTVhYhC79xCVcQ0LkNyBy34

rFbs0NtcC781qU6FZxPWCdtluaOnUyF+YCiqbxtJYKnjjDoI8jitt5848iGzd+EqPMtPhgdk4WDkS/jF

YkjB5ViqWQLcGHmpP56yI/OVVV3mM7N7rNa5AZ582C
i1ZIs85xVrN/uW345bQ18J+BuB7hkVpR3W72NLwUdTsKroDOus4nXJ7Rt3AGSz2KA1zkgs12yctU/X5g

EeZUPdF9GMIZCvxq5c0UFofOohRxSx/eVqHuPxth1fTFp68t3dxbAd1iyBk6hvvv93gUxtvPfircPXAg

gqmuDavdh79Qsuwd9LEx8Kn0eJo/AEXUbm2zRXIAUf
VhIHA7suerPkbK76QQFF3zoiLBu0Z7hENiZ8sMJST7mcabhbZ334wRNLh1zWOA5nnndOpRZ0f9JHdTK7

KHtwL0epYAwjghduuOR10jkTjkx9jKMGhg/o/atw5/7gF7BVRco3IqH0cKObQsxJlbud7QxqogMd7/Nr

XyBG+XBg8ryKS2da/iUJo0Kjm3Mkse8hC1TFnXeP+c
4NIYPDEOw1Ff7H+MXRg9MTYH4Xjg1i5aafQtmRsqsn3sj4AOc/ndUVtbaoPEDtlmkM90zJtemYqGVr6L

V8VFOKqz4lXfyuCtv4k0UQXddruEVKllIkkC52z1rRgmcOvX//ITT+9NmNcE9ZwWB6J48fH/PhP5UvqL

2KqfE0A8eIZu7IoETfvcZwyGvoDgK/eznWmWGQaQG+
TR/zAX3G06mA9cWdZsTrmSvc/4egOlOYALf6oVaJS755qPF9u5YTsCLZOcKTA5uGZX+ya7WLWF4XsofV

fihk5+8P60/szkRuyt062xtIYlAXyVcpqANdqidmVBUciHTWlJPN0NuEEwyfoAQkeBbjGipBeDhSDZUx

0W+0+wJzlt4AdKLY2OZAZC7Nu0qzXU+vAAosaMb2kq
S3JF4BcboLicWbixaNpx2ydRk6KghiQpBKVwsAHxs2hKm2KeRwXz7LYxLuy7F0lUCSFvsi93GbX6SL0R

G5cNdNvQ6SniZ97OU1staQLVc3ymFRJTeqdg+S7vPcO1WpFC6x425MhqEhrds5xufrSk10kp+vdYZrlK

WREqMiaSwvcq8ezkagAtbnYFbHzTYK52xko8Ei7x7t
WGW8q5PhxthVP3sDB9/U5efOlK2qlkwASTWp7r4OSuedEQ9Tcmqaj+9puIK086A9gC0ELOMVbJmKYlXQ

eU9nUj9H5R40rDdx7UDnyZzYemmwreRi7gCbbsvajz5mg49DKF4fuOOy3AMNAbWpj3T32vpITp42nq1T

BccZZV39uLbw3isO2H+0yLb2269PAPGRQxcl5vtWfr
myYVw1rqVZL6GIuNgbgFjbWIyWv2MT+8mo3jU75/Ycbow2vAySp/IbBwcL7og9f11NCJPnAa0HBcxtRa

dZaR+ClODIeFgaoqAzq6152WnTHpQuV1TtG3es1phOJJuPh4EW1iC6tXvmSpnAljtFjhCxH70RU4P5SJ

HY+QrqpzvRNss/g8gZ/ELcCb6365Im8qVAo7Xo12yd
cOW4AEis3N+iNM8mAbd4YqgPyHp//tISo6kXT7vNtXqFpQ5zF+Oe9Ak4NmdTNjpkhHNAzFFa8hHUX/Mv

tkIZOgPlz0MMBIHjiLSeySxiGCjl5HxQGSKQcCkguHewQg3vd8oxeNbxfffYaYlC9kxvCNxdZGAnonC4

wjM4sienXScyMEzhydq7vQDac2m/X5ygj51QgVTFYp
4x7SXtQFlealSnG+JLw/DSONEbMFK8SoaUuzGu6Ik75cLBqqCTFVnomQ0prcifvc6G60XjsE4Jkpkmwz

1AgKw8u9gz3IbGbJknmXessZYN4ALkWtrmVXvfifI/FYX9HiIQP4+XcMMSdkxBJNo+f/6c0noTWO/OVM

jm034eMeAYHMcOpNrt2ZhcKblXUYk2LIa4/8vnE/JF
MUirLrE9sG2N4wrrEhc4zvdDAIwKWLZUquspjkZ71siZ5sKaeFo1GgHYqiBEA8aokQTWpASK3Q7i5cAH

14/O2bGyFRO+0iBAiWkaM98rXQj8Jv6I1Vp7IHvuI92DTo1nzQ7rIO3AMawQd8XoB47FVItwpOvin7H9

2h4Ya5i9uPaQEboIkB1ujI6J/mqqSNEwFEFhxnNy3H
ZYSEAsLkiDXYRB06qYckwwuNN6g7FG6KTg3c8TkB4FH9nczYpU9t+hSN3/ugRYNI5iNXkbfvRan7UJyv

o4fBdBF1bmvyahbzLu3PNQ3Ogi+F6IAGakAWb+mH8J5zhL0MdyNDnpR0yrX8VGbcKzssAD20H2h50sg7

0VzOS+NBQHVRS6YSNnHoBn/zZ1qdPFrgKTMmSZRrbl
AAH2xEOAbX4BFeDXudPke4RQYpVhp/wB8J4GYAKJhZQo8CsdX3wh6Ys5zA2LwMz4CfkQ5o66vSqkq9gD

h/AcQ8xWxcJSCucX+8Hrrt5wccCctWONcDimGzesU0+JWiYmZyztjm38mB81kT5MKH+3upcYmbCcC36g

yME93xK/OGd6zbvRP6GOKAe66r55LeT5TH6+o8DUnz
LWQz7P2tU1V4iFImEsO04OiaIFZg8k147PlRgIUQJXSBJEnurZhUblErxHz+M8TyHdKooqUAolkTR88I

QYYmc+rTjQBj/1XUqaSEg9Tb44H99d+73m522k0UxPGmW0p/yLkdBOu/RK0NgkEbyrBwV1TfswB3CcZa

jHSIPKbuClIJXwieEtbsavC3chqUG60BV5tk75jD2+
4/nkFEFtZiIEvc/uOz1S7zZqjbtZYqHLVJgPGkPFMItBmziLINIIHNAN7ysPAGRRLSpeSlzrCspuyjEl

yGmung44hAEx7KfiR1UBTj5T+Imfnj7CnDf3AaIeg2Hi1kWrwcNREBnZ3t0UfP4+e9lDWUXE9h0aD59f

oe7AhhmWjvJ41zt6Ydj9pK/zNbhjVfM1HEzF/kMAzB
rK4WrKPgQ23m0Xcb8R7gxkjcWohb8EW5yKoPoeoWTjpi+W+97zKfETg4k50gSr3RSZoahv7hepCbiL30

nJQGITSMFRkCKj7pTpy5phXWYsAGjUUXHRnPo1Tu8TAZspd/m6c+1vLBh55OhtTUe/amAFfS2d70UsDl

6NnfYQ+RALFc+BC8bzDVYbZLmmpdNpK6aIA15dCL0K
iqO7SVcU3rbIqAdF2tg3fETr98JpD3FCcPJc/iuL/au1SpR52oGElLSRYtYEMbLZwkQqYyNOhY2vOc05

YN+Ha1r+sLJt5df4f3Wv1Hn4XkBk7Bk1af+o7EgGjqETbjULtIYypdFUgqyweqkgbX7uUPMFykpbvwqZ

jbJIBm/kB0jVcvEu06GtYgJj2Or5+iYQBj7JmPhHue
fzL7+k87kfCvW4LY3xnwtLpsa/w16ebgp37eGRpj8r2DUxEu+149NZ1INV67eCBHorOa2WmHk4dfsbrh

MYQ6hEYK/NbrvCVb3LyV05dOhiOnK6trqXu5ICt8nBieIT1LyFEAAzPVpYMO87+2F+eL1xTfqnnhbLy2

bWKA2u+LYl5R65ErzTCjj7L/mddIeI+mrZ9vtYUIvH
FZ2ndejenLzcXZ/bgMjsZoKL5j2/ZYmktQ7d6qaIbzBz50xx1UVR2UgSPJ1gdwi2f+sRbwE6qM3SKJ7y

Co8XhTMaDDZ8+NlCc7lofJAwByW93fbVoYBmule1aw92I7pO+vkeQRBC2NIGFhMOTKP3gLBqDcGiyMR9

DLRNPSeFFNj64AlpLlsIybkZyvxb0spNTyx9KUfzdl
oDzZ6O73jmfF3AluD3uDQ3lpmi0sXGl8kd2i09ooR3ptwfuEecANT+gckvxyhpxZNCnR/QiIXEkJ2VJx

o/AHB/1fxGtwHo8WUuyeoF91o0OFWB6XKKi1MyG1r6mC947PcjEOvIBfWD7YFv8QJtAu1oTpxr3F2Nae

7soxEGnsn2tMZEP1Eht4Yyietwe89lPacHyMBUTb2N
zjFN9TlMC9+XExT5rTB69VxNfgcyn57yccNJkcdFWex9+7h90t4ix3cYlO7rTQTHPhoveSLJ0hV8vKKV

IjQbBE9oe+MfKY3AmUNGAJ9CFoLDxTZabzYi398SY1igY2toCw7LRqlE7ejkZCUqAjy/m8wEqSnZU3hU

4ZnjIjF9AC1DBeKszC0uesO7ZneeDTlz3kv5+O231R
ggR8BrKB33TiWs/kLI1uDVmOXeu4384eGEPA20WO7W32Ts386sm+RxXzRVdi4GjAdOZKVCQTCm68S1j1

Yoz37VS8lZHjFcmHN3PRL7SnZ4PRs0prtrxxni6+h8ErjOrHTkUNKOEdTnry+XiBvujxtdUYoE2HgFf4

EuaAZFgyJluoOw+1Rek60H5HrcAmnv5WwCcVZ9Gwjq
eu+UMVVWTVR1Iqyn/gfrgCwLLMEuQh9qc4BIg4EMp31xreDBDRzOg4IKo8M9xTHDVNYEneIp6XR83DRj

pP6nvXmPtPXphTdnobXX7zhd9KePC2BDkqEMkLsA2XOqgd5StQIlo/TMTwaDf8Q67KM5B5J2pgVjyCzF

M4PpcHs0tQXzhvvvhDg818gc3GX+bySxSH/mGc5AS2
oep5bthU741Y9H0PP6SRD379BfLjUpbPyOyd3YTIgWdjXdY6w0CUPmNCFm3ZOynZIbtsTsvFVciMdyGv

jLw0C6kOVxGnGCCHLMkaRs+Hrx2jvjAUhIYr2EEqn66eEpA3qTgfBl6DEZTXlU6AXXXGvJ6s52zngv7G

+2EgOIKWHWvwoUFO6JK5m+M8hSFUYEDax/N3eFGXYG
BatvIgNpGG6Ufr4U8y0nxTkW5VggLDD1RU0GpXzhhLT0VrMWh0K2pDQ9VU3itOo/+zfTqWrwrPiTFacz

mzjNEojjJxd/yogG7ZJNazBIPxTezbntg15LK0qU4oLImAQ8Jy7uvUnVX+Mills/sDHvJeNyfxfHcd/bGqX

BvtuVM7RmSU4tadUX1+vm5R9CAekRsXiuLxJz8TWx6
3jkMwYGibMmqUMfFKEUwpCpbb10/2zQQFxvzY1IlNDVQYxX2gJmW0v1SEMp+h8SlhTgGyHmkl6rU4Hcj

frbbc5x0ejc5BZ/eb5fs33bieV2CqL7J2Sa1qKoM3tNutRBrWOdIWoii+OFnRIR7R9kvyFXhbNLSQ0Ab

PohTc5O+2upLJWws/Bn974WAHNWRtJSF78A7ry0M6/
T00XGKAFvf0yBkEiOarHsgPTfgwYgNF4s3dYoVCqOdM3f/aW8iG8ddt3ZJumch41zBHm1cEhjv194MvL

/xwc2HhEEVqjnmBsrHdbWsiuJjRhSEg86+zfCyiZvoOrkyAYBTYFp8DLjim0FccUgLaHWx90Ep/oR6cX

C4HLLLbm6uAO7M2FOCEkzcM9zAsba1X10N/TVe/f+m
eFbFfH8d/88Q2OLvZ2UbmjpCr54Wt//t6F8CwIPZgbPoMMza/G5XJ9/DJRTE3K7gp8KrmhfkrSK7EFmg

OujNQ6QATpqshzmCiRhJAY5O9jqzcuvNitF9BuIamwoi6qKOZo2HZw32DWxq00lLt9VFMpsSzBAJ7wK4

d0Ju3i9fb/zDJIbHo7RHE1ze/lp/7tJ4qsdQiu1XBU
QrcE1FYwp2qwgRDOxlGJTktbU+hveNHHJx041NYv3tea+NFNN+nuXIURbf6Q1ZQEAxzdlf+7QpvxrsEx

di1HJF5YXnuHNQqE1QVacXoVfzv1OAnoI1U5IiISmIjPdS7HOf0q5/wUiL+pppZ6Ls8I36dkg/QDWDGl

VJEacafTXKSPKa4CF7OyyWnm4kQWOQi5/bp/azBjoc
//zS74aZ+BdnTDK1KkwNgL9qMNjwgHNdsx0WBEKkjnunPHsp94jhODewDxccC8MpmNW1nrSv7NfgE7EG

M+qpgkQ1jcKmjqAEw0VDQ9HaIJhsowpIpfl2xnz+FmQ7nipNFHPxO9wLQLZ6dz8uxWYrUV58/2Ks6c4q

B0Wj33MkGSrLzJpC+J7rRKEDjCNftdc91hWW5/HwCR
HicNCWv/yBIksO6C+EMUqzieE7rABx7+ACrjTaYCnsM0+bNgJh5d3MLR0gwp0+uBYnlBsYzZFh7uhWbj

p/gLD3hDQQM82zmRr2Pqpmyayav5D/I4E82UWiQnRA+d//QrX4d3PQbom84vub0k1IiXAgqGYLrcoMs4

kuOKvtTfiNII1QLRY0AELcWybCgbcAdtdmbSNsjYEQ
N6TjKcb5S6AtT/nN6PdpqOS+FdZnJk/X3WXCNPF01Jqmykc7aLoTbDb3fhhjFS1Ezl6pRdnswrgeBtWk

KSH+VPuF4HmopSPufD6AcUqi+Tbf8ZZNnSmajrCTEbn1LXOA+RoyuM2rLTrA6dOJEoOxNWZp/wnFqITj

sXMx2MrqGJGELpZPEATbRw87HSxwfStXIjPxu7ipKl
CyDHSPCVXuRNq8iAROmHnRNKcjlnm6Wz6jWNWXFHdh+8GNTLb25Y4Q7+eOSaKg2F8dS0LlI7DutjT7oH

Q2oUMCQPupQwotyRtFUtTl37n0LTLNIz1EXdq5Lsxon9nsc5jC3047LgFCuyQv4mBBiNpMAJNlo94/EP

vafqk9vaE8qLHBisLYp0sWSwVhegWwVo2c2Q3Dmy5y
M/BxxJ4cqCKFJlnAjV58Cc+oZdQmD0od832nLfpzOYlYt/p5zHNVvb0hxxR7gQpTVae9CZ5GhXyM37Cj

6Mna7s1G2Q/a7GWk7upG0vy35disbR+lHHC0AuFqtgSz7YwKotT2PmppXJxeiEEn3WjR376zRVm8wMoZ

42r+a2FfSozim4IYokfEDh//TRbSw8agpLe8l1m+HX
zNVvfLH1WY3rx7mt0u1IGOgBzUO4VKCYTV8YZg2eJnF6A44g529dGn1qN2HZWwVfippttHA+52+rRzVH

0wJf+/ENhydIfKzOdNHlrvxfOMOa9H67YcO9eIILhBfaTPb8cV9ZMBU3m2Os6Xy5d0zuvLUgyNXAjS5h

WFvTGeRkPir3CDrbiXDQkHeuGkFUyfXU98jyU3pred
yOV3cfYVXIapZFTkx0piRJrWcLnfFl5sTfySfhy/HzGE8XEvUCz4/6AX69bEbqk+h3CMFx0YLqOM2JbM

jzA+PSRjQnfgJjmp2GDTtJkrNEdEx/1IhN2ALf5MbekAtG/qWYpvAPZjwpEXA0QmCP2b+jDMQxaeo3+0

sv8/gBYB5/lzANq+/LeR4/tQUWv8sPzmwjp7jSbvBm
LPO7uRLYqmUQGHEN7UUsgtskX6fRIQZYSb/2wWopdvF+KX89uoNXFOKMpF/YTxdHFThg65+e2pjPORxx

M2P6unmTjB9YseHm790myv9MXGMSisXRJtAYasHa969N63j1/8h5BTEn5kAZJNyL9EX9rsV6XUh39EBb

zm8x1pX4cySn3KA02HqH/7Mio5s36nfqe8dQb3FqAo
jk645pj0d6TbcHHp28KrHxG9GHh8jzdi7Uv8uY42CZDo59yKO9PgqwHX3kQXhuHMJifNziMRUheolVRF

Gp8pJXuVt6On7S7Xs9HB3bFdvPYwLO4cZ/EPnv9UfyODCrR5V1T42RGlo2y0tlySLazKxXx6OtBUPNrG

mZ22DlSc36gT8x2+Juo/PQaL6yk1DnyJGeeCMxLPXd
0OmBuLruFRKsqXyOZDzR+w6HzEDM0ehHLCERfDfsx68Y8x5I5/A3GPvRLU+0qoaUVVmYJ/4+RrQ4fk1/

aF3/CmKGirl3uwWIVP59QSDgtpMPaL7QJYG2Wk9eRuUt+2NI8b8ybi4PtpngLzzRIIIDSCuSCosqVLPF

3qDJQhoNvIw/dKjfQjj1KFUPBkeCvSVwotuqY/Mv+1
alvyqx6G7jGTCuVHozYmqaNWO/QbpwNrOwcB1n0fMpG0Kn0ucwxHw9rjwnKYBd+fn/rb7uJEEvChn66Y

j8S0eX2Sk/4IoEZzMGYU68C3sGBkmJoDVtaZxsSjtqeFaDb2L7wFbXt72QuXBPJnRtlGLQPVn6OjxkrJ

2mq2gUIi60/5VPebMZsNVk4du/ET1CYiGTWF1Wawjn
A/VbQ66xLzwclgbzdDEMu4V5Ofv1U18U5sctHT1ftaa5Tw51XYCZUtmteK//ud1UvZ+vxXa41BC7nHmx

kf4VALkkcbS8E4nsLKVHexFW0EQqxFYNOg+CNlijjdXVqbkwp9cNnkwn4ughMWvP20VNetRn773M1e0H

C5rW0Y37tP+HFU0FZ+IWq+LcFbilvcoEwTmZ8uOshw
02pqrFRbGkq9kojl0JbqAVZ1Mi9Zt5QIBXkdl/gvu04Wwgc8KNE1/mkwY6sZemsG0eGYgsrxsff8sXQK

065YmL8P9PwmBDTUO9GZ5qCfRFa6EJ+yFlbg6IRq/RncH8JLcuYGSnTlZygxTO08PWUfqM4b8aCv4PkO

KdEdzjlwe5cftSyPPcThVZY3DgpgNZ5CMNHwe01Syl
5lyP40rKEnmjeeBZvqbGOeiB6oA0TbOLZquvqNWenaldux3tiwC0A52TwYHha8sjgTjOpl3mkdMAR8S6

p/povZwTjgYbu/vRwxYeAvcUuW+w0Um1hEpYlmPBl8v6fCYXUBJMShwZrkvOn8CEdfMXxUAmiT6b+2ym

SzIq4XRnbL24ZXJlGmozpC0mxTTOr0Q/NufZjU9aj+
6x7etMQh73wIVo8h5JV70+wkxlodCuF8tF4dwFBs4UflD1jcsR+W2R+XxoeUQ9zEJrjAoea/yaTVMXmZ

gxrfHB6aQHuMeZuVONn2d1A37AE9hdMMWxbGgMHhoCjdTii0Pl3rHs/K10IgwAAWqJ049rs/oiwixZe/

y6OyWS1q0UG0CX9UXSjri0RwyXktTBq1nycmzwlUTd
EY7PAiy++wh5qu5T4coDkDjzTxZ55Bv+XnaW6CxzC+qO+aFvErnmFIwZqRxu2yU5MHRPzNHsg3lzobl2

hOKYTM/+ii8SGzn/Ae0rHksHY+SdutkbaVA1kmt6OIS1nqefmAny3RLoJHf1i+5qSP/VH4CKR8CXUho9

oAM5L9tPq2pkpkGFdONCS4HfNIdkMq6VBmEOFOcJTW
eL5Thpk2PzFEl3BLyb+2hAIYB/VsJfuOJq5ZalZOwuTsZftsbvIZh2DCG97Jdfk+UYkjlmhFGxZLYJ3J

8r+2jyinpp6l3i2WuMFknDMnwiqUPOngMy9E5HBEl/zfxVe/joargK6WSuHTAeJ5oOmNZSpEvFvBfUqF

et/OBs642/0qhvp/l9Ov32P2YVPLu2DsApYxP6RVhX
OQJo2PHdpXJsqJP4hw3Mi5pfSqwxiWWBUHkjnz0Ak410wwHfb7BTR49x0RmePySS0c8sZKK8JTSclwGC

ucGrrN9Yu5hTbKHWcVBlV7TSdLuO83RqaUPvLDKpamxY0OImi3mwVfJQDmPCCZeVAsIMw8WW+iJpd95V

zCqPH+TBAu2qQxDF/36I6c1FzPM3lMv5k8D4lbYuJU
53YsP3ktBQxyUIzrMVm/l9XA8SyAmbA8NKelbnGpm2aE5fKsNvT6J47r31kHnbwBz49G4huIl03W8z9n

7XunJrsUs9P/K7cymUq0wPUG3XFTqEFRavaEIL5loOhKhAi6zpm5zHGC0n7BLwdU4Z1WDDY59irCGtbu

DnwebpwEmNYT15WcRrq9I7hfbzVMKRre3sG6kS3A/V
AP+I5x7uIEbkMtvOvU32l/sfnhWTfXrj3skSChtNYE6McBT0Ql3AGU9d9NyUQ00Pd+AptS8Vjusl82YP

VBW76Gzb8lIDIlfBRsg5Fa3XNzDcZ1IQPuhpRA8NI+5vXS3i6azJejPj3S6dRYnQCfFCh1tHZBWP5mjc

QjuRZGHtLNgDfrg3H/XaFyJWgHZOsAUax4QiCK3yef
RtDOkhSPpQrdJ7I/FDhnboLBPfjqaz/5G320YlkiUVdOOEeIhJhs96ZdiSJ+X2u7kcsuwu90uS1aA9Cd

UzCpc7azBEN47+XBvclY5SKYzE+r3p8l3mmPLVnQLlYJlxmprlG6Onuq+rdVDFn13g2+qE2OPGEB7jpz

McrqE3+4p4t/Qx8J1rRzDlzwRZLzLftHFFGysYfvFn
Gy8B+Iw5+F8GvIAhiSJtvruHVOF2ZFL4VTg0irgomYcU40aNSVr5F+w6nSxmeHnnoojLo251x7U1+mX6

ZuC6ddwNq+cOchMa8ZtIXxYL3huqiXhNGXW5yKZvAO8lcqlh4ILdayR/qq9PB+PV+/Ia/I4C9zT19tYC

2f1u3s51GUYq4hKdH9fne6PnKlt3A8gNpu1i1yy7vN
SaMxfIPM1oS2cjB97uR4fUf/s3f99eJndx8jNlq20/+B+FyT/nQvo1Dd6azEYiXzqD/+To0955TW8AxM

3PzxrY2mUkhvk05Ivq/1ExXJ3obL0Ac7TjxYCVxa/EpidVIAN94NlGdKyqjVj/0Uot1YluYDoFd3Foai

2xXFSu4L/EKLRuFgA7nuqa9LC/GBkAqj97njtBNEqC
RJ4GcGfXmzT4Jz6LQvAZmq4dt5hr0+1J/0Tv9N/wNxC1RPjZvj1Utdj3cQSOG+vU5c/tbKfPBa+CPwSp

nD+eBwMT4q9W0425Wc7LcjrzhjydHgZfahMt+ToRZz3C2aSlx2bzwnMCPNAMkzxlLE6OSWerdR9vywws

kVAxj5f/JwUza+n36bjK7ON3pJON63cb4HfBWg79Vv
nq6f+b/csILsTyBcepG1Q5YR+mbL77rvV4j3o3gZiAp/Cc1hvq9SghbCWVI0LtZBGtCBM2Tb5zpXtOuj

YnT/62SR4yvvqmdhxpCFqjx5wH8e4ZODfZq+vPNyKfaCYRC7uaEDvHgc9E2gbACM3CmmYMUsrGrpcdZe

na1kXkGI6IN5DopCaEBhgnvIq+Z55082iufub/UvTZ
ssK7/JTntrbbJCpPuuTQehVfoXXOO/zsdhLx1p/aRM/pYna6gjGrcPWlvtf6a6O/QQEZa0x2457UXooh

cg4y4JVYLHcbd0LIEwcQSr4N5so+Hjxh/+Y90J+1bsbilh9BJ7yO8bKMclCqCIcnzikXDA/U9fjmuqbo

qR0ic/X2OsSTFalJHJfAN33snggK7DNUjbuSHXz1tq
Wenajolv702oSE7ry4jqiJperd+940SK/T7WXSuGbgeTq8ZdliPobcU4pYMYRX2Matj/06Zg9Wt922fx

BUVGEaQd+wUUi1tjXtWhemIql88OfZVIx16x008vJLWm46IhKSrBbbSPIkrnTI3HoMRoEdexUnk88uil

pdM6qqrPr8h0F62V5M8zGAJDQOQ8eE1rYLeOwv/iHp
AfDx3+suYJ0mSTgJFo3QPZhVJyTovy8hwZObvOM1hPG3GEUcKqS+QToEPDbcG6ETD22Hf22iZC2BQCJs

zK8fb6nLoxO5aaOgdQ+4zLq5L66Eln/NspQP+/rNs3E5UwiuA247RGF/9vJMtneMt/nOYeF7DlpSf0Sa

crhK/RHRJ8PvWRdv/rOsdp7i4pN/JHgSnxFACHQH/f
s6C/qeuvv54Bm58KJWMUm2UBtZtR1awKpZ2V4jMxrP0TZfobF/EN0Fh9B/aZ/xpn4wwHD7k5uWHmZQMt

btgteDJmrNzLrHadAJSJduD6ImluLcy+eD2Q6AHGgjTJT0r+xoLXObHz7eCAWCbs9z+6siOesJ7HrjAQ

X4kOhkQdYp1hYxqPftXtsUnPxDuUnYPtVVADohO+p9
qg+4PYbX8ooEaHxWIXQk+3/ilH0i/HWGJp+ynYBgyEWKE3JDPZf3GR34EVFePzUxswNUsA9ObY/pXLuP

xG3Eqg91n5B7m/jEWFXZfZ3iUrt0D9P5cvs7szXVh2Z6DZKTNUvRoZfMAqymKhd47rdrLAKoI+Ki6bPx

lepyMQ/w+HTH9wUkU5tC5/AL0+Fpdxp9VEK4qRrWtK
YckZ2pE/ffR52G/4PNG9ogxarN6cvqdJ9fqt1MciSKTSHFpircUPd+HSazFZ8uiuDR/mqO9m/vlfzOLF

3yoBo8jpBmP54ttuQeAofPYRH7d7f/BC+BzKndzZx0bpG+1iUOA6u+XXH2kCeTRiouVyOnrmAsl/4DId

dAD9GHL1IUjoP67+qYgwwEojj0VpH9Um+KHpR9P/F7
ThSPz120A0BECLpXTRMfZYF59d5u10LfT++oIVS39oXcbEZJ7y+gQy0ZWROvGA220hckrz9Zp1rrBbwX

CPHsNj7WZG75kYn3GcHe95HmF8bX3yoGudhn5/R6H31wDPOGkWGuGtd2kRxMkh0+/hq6xQuyN2NCdDow

ebISQsqJH4z0NmfMrt44DWTyX37k1KRveMowmOBQGg
KwUtTgZNL+iSHNrgTXReUya2Cw/zxNHHfuI8J1HEfWpBuFD3hXvtkR6LuK3JppiisHrpCOcGcdp8Qte7

yZAjLhteRqn3ytFuQp/1CapHEXj9PrN8sCBD7aE3EyayoAKrE8cbADm3UotvNfl5WKdNOrBi1B9e3Iyo

yi6ARwkIc2wLonl2GO/K1VOrP3bXE5cQ2HaxczBpT0
S05HvIYCU1zn0Rh1/q6PCynba+Nf8ARewxAu4xnFOrA0RUav1RPGNUn7sMhfzaoI3/OISvAD3NwWcw8E

I/aafBy5DzVNOiDEqcY2mc2ScsYI+Q8sGBXBPFr+uhqL/rMUvIDSGOtkzGVkJ3jcoDr4Nvj60pApDQsN

so/CkSccGVpfJ7gALlt2dzhEwH1Bwk21k3WVzL8pQ4
A2VDOziMSzQBV0DwXSMew/8Eo5KiV0f8CC7/fkg2ciAgKvV3zYuYXb3MDX5ZRiRuhn5EZR035gHHVSin

6y+Q+qdm5z46oCnr2NJtlu05VZgEkV7pBFtDGpiKqm79GsnExMQZO+XkwRT3iEz9D/B3Ki9URpBkcpYE

wPg7laQm4PYhDAXMmnnP36QjaoEWzCUakT+BSlfRWF
vkBbPKmt/+btxsoe+8nCCrsT8j/oWJ8qqFDXdrhu6wxqp3GVgkmIc65tah8NMjL0F/wcgM0H6LHgYgqe

qEtpo+zT9siJPnTCa2uveVu5UHH7iUuhMnKnBNFCIi1G7tn7xsct5F3kS5FJ0gXuOPZEfbYXPmxZIwPP

b765+fDrcBka7yZfvu1mnyTqcNzVQAxvh3Uo+8NsRc
8SdbnKpeXJe/WSU1/qwUnQAiFLQcRvpX4nmnsz6nSCGI2WdpC3ijgbEpNLRqYV6kE4IzlCtlq7vhglJ3

yRPdkVlcUPH9q+bbooy5wkSmWrHu4xHyE85x+PhcaX2rshOnq24Rh0ZHA7d8sqaqHRrsli0J2BPiMatU

THrTWpwEB3VTLv6d9rSymwc42FxPPeVbPwK/4AMF64
IUgmtr8RXRdWJHA1qhK0/i1QwSSdxbuHEPP8BgiAJcWrzLMQh6uatrpVoTUXE/679eJ2Y4aBgfIHQu2F

gDLXa3xJ28zzqY+H5PYoC77a55cdV3wsujVfnY3WlHuUE16gmEqplYnV2BJlX6RCSNJU+Bto09idBOBL

aKWMOO/wKkuzhSe3aCNcLbsjbWOKLDWhdxOKcybSUn
6CvPHFurQ9XpLf+INFdW2abhJYRuQiDqTK2orU57HYR9R104MyfhYVpfTZuS4TwvDmRH2SSdQDStSXZx

oeNTPMUoWcnJWt7z/iJ0M/ex5T6kbh6n7v3ytA0nFfJ5MZpMYky4k8ylfMIb0mUH8oAhwyImoiQ/4+cr

wPXGWa5xbbnDqqB9xvU52BRJ6QHPpq4uxs3C7Bapwn
rZdl+/QVSxU6K1Uho5CxCAZ/1vvaa4cbktpsWOOnfqnmqixIEXMXPZpZGYPOitAYkyDWTu3Ig3z63S

/bPq8hV9lGCKybcpNwuhYINTJTKR+15WYEvOB6YizcLgf0G3G/+PU/7/QujNggtm+HHatbk8rHuykE9U

PlyIZtxCdNK3UqY41Ul3R0vWxwFSeCokH0RU4j4mbE
XxgE/VzwcAa/VHvwIB6NAx1OFYvigifYzlbJcppWft/d2Ui+mmV9dmNHE6ItXjkwQ25vdPy6RIworgLa

4a/uzbbYu0kRX+jMZLKqTmTZiQzKTPfwRk/slclR7J+7A0gUOww/bEXK3eIrekJ1zQN1Twm7zMJ99wVC

6jxAqD65lkfWPxrQaKeE/BriXGcczKx8Lc1t0/m815
yn98FwzZgKKPHsufBRaidA4fD9gDdMfSACM/CmsYYKT6CWHSWmnBPgQuoJ21QpiDIB5MlFwzUan+ZTQj

YS6VUNWJ1TGFj/wgh8BGYsMsTW3TMXhqLarFse+CqZL6U2pfFhfVH1LjTT3zCNKpKrRPwy8IFvP0utrY

jXT7MXHohdC4Dv8alb93SXTDhcG1sDOhUlx2lckxbU
BSF3IOSdqHZHq0yG5bPDUeJ/uDoc52J6zAGhwPmln6rIEUJT6/+dx/K/4yu79YLzxhbO5jvRX3n8GRnx

a8ZDaRGCjvMSmp+YVYP/AZc4tZDUdrOzixxFrbJ2HqLWYWjnPJnzE7+HbmTNNfyzhu6QoWLacy/n9KgS

UtGySyxUFd1Va1QUVurFq/X6QBYHQnNv3u2Xgf5urz
lp3Gu9fIleRq9cSAmaqy45daxzTT0LXn1Zs8gMkF75Hm6ip3QZEV3noV+e7vdkWhNU3qP/rwz2bRgMVG

FDHy738e2Gvf4710gnOy6PmtvMWqVus9pg/m9Dgvbn/4N5FQNO3o/az2qph98jhdbJQMvptXBf6cuf0E

JO6uLapmS8JwYWOYihxSb4V7RQYWq8kv+Huf8g071o
7s0Eik6dQsi28X59caecyPTqvRrlliDT6wrAvpZdC5pS6fA/yP8G+sSUE03tZRxQuLAcZKCbnt06fc7I

ip5oystqw72FTXjwZXMqc2cxZrWWlcIiNXc11BOMs/SW47Da0gqKzGYC9+UfYi6js9Htzxt/oLC9xNPp

0ZgLZuqFOy4GwNPZqb8FniFjDdcCVslqnNsJQMviHc
otgUAYsYXCOYv+iwVIl+11QeciQMoz5sjFMtAWpL2UaHgR+bZT2gUVInsnhfR1lh1y4lGxLisx5z4T9P

Q5kY47Ck+GBOnAH9ftykJYKd2oCwX/0eNhxRA9iKsn0KvCi1ZycUc56BQlijN0HhkfOtmoRu7Z3yWCDd

txX+IUobHA2A40EjnTv/Vd3nJZYolE+XNx88NoA84C
Y2JUjZvduHiIj4xO5oy02o45JtR2aFDF3uRI39RnSw6K3L3a/MzRIishmKs+OlmOjmHsw3Q9AN9NpH3q

8IJCAcoFqEW5/G5+FIiqpMhvUfNpjgvCV8L7f2+uhcz27DIXBU/0JL0SO9nOTYe7egRq5A9pqzo6OXB+

ibfZpIBOqahadosMGKsUJNDWXACgmWV8Cq8nDmsoDC
ZNDIlrp+Oxfw8cZiaz930LVa/2UNi78hkPsYgGQ45MEE9dxUqv304E/xfBa7ZWh+aRtkoCt270Sf4Blu

v/O+Rwo7UB49y9tTZHyYummVDWBVbNfyV/g9TD75BOpv1cNzq13HGLFc+1ZqJMUvz1/0oz3r7VKZ39NV

anPF1j6c6cI02KcCuB37Hmj9Z9/2kW6crYrjGgOWBa
ckHQ234FIxOdgglvIEclHKuz8hIRF5h5xAs0Wc2tOBxc6TqZNATfBoAKVkivJiiqRaoHvjDOgCxOMLuf

xWjkIaBdxS1Zh0VPUE0fgzEgVakeCaVBACKH+LR3RxVjejQark8GcsSMzsGG/Bh/uvLWd4lR39MWNeqO

RpHscyX3WzA6bfP+CO0u9Sr/pbMvld1CtmnvllFXsV
1iUsKBr8tF11QWtC91EnuDL0wfdu/9t47WjIIHYGjeWClSon+NZ4whRhKK++m+h75wT0zhUE9ucc/FtS

Uu3vpjdPNUWUvWm/b6v1Gq4FltOfYc+g9jj6sCuDhy1NIY3EG7pD/l/nIk7CeCli5U6To8/yjSNPR6bd

ivLH+T8awe8LGbTr/w8Ivvr1tga5aC9a6QPKfe6L0q
XyhGQ2ZAfq6gK2dMHGv8+CpAtj6G1ONmVa4ktSf6dO5WW2HZdrr2VqgkltjxOmc2QDaBes2Y5I4ROZ7U

BYiep9y6ylTdcl42wMP61/LqGk7eXj9EtcTmQe9LzUK5thoqar3+1iAGa1XvTTaun10VPH/3CZtk7R7C

CYeMJg9gF+bonds/45PYKzmXMOdmxJEcC7PJ3mlG/ywYs
D/l4RV/IExZc90NEGXydi25wN0ug4eRFH8gIM3k1EHFk+Y0u74HpoK8VFMcYBQo4nsXqbVM0Gh5p72Td

/vwgyX5YUoxKFpY9mI14H19FBs/7oBPLZsQ1S4LwGRtT3ts96Y9y+aeRWXFtMUmO3QyKDFIA11IVLo3G

QAoAZy0tbkrCn3mAqJzXmDxpFUl8n5xWIF/Q84xygj
wKatrX3Rsfe3fjy7GW0PY77U78YCHljMrqBjTYKIXwMnGNTJm+NU03AatAimx6I1zT5I61Y+PCbQ408p

hCYDwztEG9gyaZooh18VsHf4nclebHNFST6vwiGhkgX6iUkpHg9DAr7iS/BgpFD46C+fxqncICN/lpDl

snwi9+uFsEC9tiKLgsqcACZaji8eoHEruLLD76N24S
eGl3gUZU8fysUSm8gge16NVxLjzurjaCTR1Y9WNKNKe/g1iS6kAf5PSrK706atj+hrhX+1A54yNQD8H6

cjJRBX8yLysIWwJMOX7MVnr/jr557R2hfldRSdbrjOfpnmxj07wDGXCXv4MVn3krI6/+HqhdGKHGwkb7

Udyy+N2HYB8MSxUKuiZZd6Y6rhp9DMtPFVLZd9lBjz
JNUak0yFKktfEclZB0NvbnSMuqqm5sIwwC8h8QfuCGXKYX+sQ4uY7MhaRuGmLmsC7bjs+WO3vwrSlxRU

aRMex6xN3xAS5MqpVX9+NATcywBSJ0bDDWmlWI4+vFgf3LQMX9Tj40YcLZ4Gl63lRuWOKwlgT9IAvCWY

NEHZY1FORapJR/362p/z/+hf4sn+mEZmuLMgmq9+yl
94foiEuRjWESa5VMvWBLmuqVMQc3IUYGVoJCjtg+msrUQLOrBoinPoS00yDpFxpenmtM7jCZ+lTUB62d

MbDm7VHD//Oxuq66/dfpgO8dNR9bF7vDkmn2iNzGhcVgLDxhngeVrdh6xmzOZJMco5Bbshtl45gIrI9J

OJfb37FOhX5L5W5TRHc3XaEaBP4zX1Dv6TicFmBa2r
BAJacX2gysAuk+sO2UYmbIwzlx/ePmDTgJwo7UUWjKMxywDleLi6p8TPS/imUoCdMqPSZ/Y4SyfMiU/7

u6bcZUTQVw7jq90qospfTcp7IqW10+OpK6XnfJmxu2MVkTKM2ru3lNGlViQ40+IL11gGZ0cNga19nnwE

CUVlzelvnFtN/vwm4Kt2oh2DvVZ4Mkn7M3syj+zKtN
4mU5xheiDJryzfVJ38MmUMekrKyvZlf6RMBJpgyiU19Otr/lZVbHs/ltci4PTxhTB7hDITalSXikIFDL

AqxJSdMnVjgkJFwZAqL0a7E1sZbfYq5S4GaxUKJKelkA2l2hkY3DzC1CUeAQ22Q3E2FSv6ab2hI5w80X

Z/dcsMdE4j4o6ntChmTlMvFtJdzS/71XZ3NS3zdhTc
2d+IBq7/MkT6/fKgykjFUbycy1g85vOQfgEpU12TL34/J/BSvHiIDEfazDUinmOXlzsk+Wq7MK4w3T2y

K6Mxc2zxXIpYXdTVbJ3ozK+NfFQRw/hgr8PAnpAlfeey4PorDMg8dIgeabZf8l89RePL79amvzTYGNCX

ipxYVZjGbbVL1kS5L2AoApC4YqtRNu4fbUqWc4GjSL
NXbH/XzFBuLr1fVglQTPQpJqVnrMqTdlx15MxEnUccatY+SAARtazu353kSeaHA4CTmr9bJFlaKhiIBT

vBfvF2Awr+T1dSgAfXGE9MXrU1019oa8A9lp1XBlK8rQhN4PnsdZ/xXIogJbgDtMd1CRl9yzFllnhpaU

UlOezZPttFi60c5hVVZCgQUCFDDtqyELz7AfPD++GH
NBCLd9vBSvjUUHpYSluKuiT4E8JQMKITVZpxHXiQXfTAikSZIHzj7nK58s+tFbz8p52H6wOb449RGL8b

MvxrpDjiWvO1ZCN34iGU8plB3vnSchRicoKJ3vOpruXjPWs/FN0fPa23u/PnzNWJeAVo45JVCHX+j777

KFHxgvoymQWFO7DX33d7zTjQHpo1HEYbjtvSIaDe1P
9EgMV3f2r28gb4YIMQ5Gdb1IvOnJyZAI4l0DW6iGOC45FTcas5wixGGUGN/zAXPjQ1LQ83Ylhh+Gb+JY

O610ECNl1j8T61ecIqtj+L859vopYEfGSd29F2jj5/E8HRAdmp5tC8yzJGvugSN+T2M98j30pgl7Th2t

W7FYaVxQ3VFDwIUqbJmMqDfPG3525xEsbA4jQKnRFi
C4raU1MkDYHCyKroL9Acd5XKZlKAjbbUxN6byfbt6foZNyrL/vDDVmGJo0V0lTgF63IewG6LdfZg4/FY

i988+Wto59bdoneY8D/enQZOy/2xvsbqtQdXLzVUAEhSpXnoFfDLDT3OFBQTA7n69nwIZ8RPuspDlpGW

sljdlMVXijXuR7Vncouj+8OntFUjTxyP/Vl8peQfMz
X2KEjbULuEHUglyuN3qnpGmXdTbF/fX0E/FVnLNibUm0DIfwlJYtlBomaOKvt7v9ORrdhQWscC7YF6/u

4O2kOvoaLbYLBoIWf9ui8d2Ks2osh2gZ0WI+SgNJSk+LauNDp0Fa/98BScqUeWN60MG1SaaIP1lytcqv

rLNiiwYC8nj9D/sIGxdDNwrNw7ekFzXQbg9IeSVNNj
GXAMAkQTaVHq+NxuqhHE4CGv6HodOI3J3g1VUB8cuBqHZ32bDHAz890SvqB8ndW4dzfoWIcgFK9WCvL3

Il8RPesOphvl8sNLwuLMy5jX5eejiHFNT4xzLnVGdO/GBcI+6sXWkVH/TEjr5UbwoibEGjc8aFqUEXJn

DMqtzhcroGy0hR8SCs6nNWcB3eYVYV98JpIHfkYM+P
CkHdhmlhO71j6ziwPfnkdquI/rfHdQ58ZcD2uxgEAPBIqnLPa3QwQsZdc3WXsudVDnDJKBdn2caOKTQs

g7vIQeFCnHAzDI+fbFJndY48jisJTVx3Z1hnZO8RGJyASeVdiVAByLmIYRLh0qj2/Siwx52sTj5WarUa

mB83GGv8HKlJo3TkEdgY/cuG3d4w/18icz5TXGMQiI
mx517gm1m+u/tVuv0YRsT4ILpajZB1UeNInmAarZvjclBnUk5ike4L9WH7CO4LQGEgoMFsZiZndOrvB4

[place]++eL0R0T31rf+nmVuQMKgUgz0kclKNC2/dr4US9/BP3TBR/QfGGF75/XcuRilp2QKVN8CTwtUVuSdP

5bpo8ErEaqKvyFkAHyoipVZnPM/Z8Er6snjzqJ6AbC
RhsMZIftoBU5Dyd2BrzcOR+6rVINdTj4uo2uyIbJyavCwnhsuYCSecVrOuFuAMQSka5BPznwLNggp32I

6m1U1Cegv7EJZeMPSYK7JDD0/3SsNKzNiCzF774IoQPFAgiMDmx960bm9cyLU6LT57bKFrf1m2yWOzCw

QksSMFFxJBLEMpqXan31FYTOq/4JdRQFMZR/7ZpXxu
8g65xRgQOmIMChAmQdvoI0c57XW4PvfK3TRFnBKvshjMaJyi98V/NK/U8c6XHwGZ4iSae9vHFWPAMKB8

IXfNtWhxlQzJ4URUUQ959WK3EnmL0NwKa+5CaWw1S1W/jEy9MxgAYBTWEd26z+9daztajihCv2vg4KT4

Cc4n2s6J6fPgVfGWcy/BuC95dOHlRv+24R0aqk5kDw
2wu9ao57hwTx5zH2FX1S1WA74yyI+Nj4mnZA5rgHYDMyaeLcqMObVqECyAyy+/bPAgybBW8O/2Yzzib0

Pd6rwwUMCYI6tzLSub+ieDEesxDt6RxDEykEdM8iFR3Voa7QKn4WmFYGJXJgbDoVCfwT8389C0u/ndmZ

3Zm7s3/cP56/zsx5z/jeb1TfcNqSN5GXxtccqkrnBY
EcNcId3iJnggzy0GFwQr9dn1N2a157Fnxf6iW+6WCFBUe+DoMQ3K1s1TZHh81fHz6Sz15CrMhypHTTQJ

6PTSn6qf7BxMo4EFAewIKQ8nWGcIhve+2NA8sSk+sy8Qx2k0j5NeNlsokBImUyxe58O9cEjj47rIpAjx

5cvDrkFIK6LLjHhb0BDsKorRdrqkTm54aIfDalXZS3
VHBuIhRL2rXCtqh/k+VaY64n1j3hjxh8Ya+l/UNemPc2ZznrX9cgOdT8EcxXbhIoBfl1vAscWFYj/9KL

VQurkjUQPigGGW48hSt6Fk5k0OkcMGRaauyM1fP+GlnBgvtmWeAdRgdMwAF39cHkcYupn9w35wOozlTN

vbkUVnFQWLL27OlmXoSW/1rWyjHeq5eoDbsWDNMLsz
tY+9nldWqnrz9Z0B1969QllFy4TOs3v7xXp+L6cUYL/HFhb2LILiNILIqzJ1sQVHHI9y5CIZsat9Gyls

Tp7g6tM8YEO+QoCu+ndCNXXcVRD8eyYTENuj25wsVFYemgCLKMxQto4Qa/Q/q9rbnr3b5Cz7FCFWVNG2

WA4jEhk2FmGH55cziuTB97lc6hyRNZkJqCdx1ACF3j
P46NPDcvD6FZLW5/vlG/d3X3/3V2Xxezc7rPCsB0k9pRTZB2pha7xpHpypdgq0/yfkvWu+/lpP+hZ1Uu

l2Ofjw2TlygwdZWNBVBLJGyIJ9aUGTGV3OMy6hVa2qtdAbIva/+y2HhZ6cfoBEkdGvIDnJtOLvV/rbEk

5oTE091KrMt0g+w34zZofZff02ca1ow1M3Y5yq8qfl
hJ5/CX2kOUALl5u/q2yPd1/zONXOB+N7tFxPN4PmlNP/9Npd4Ms8Ief+8EUTyYQvPL9rWZe6hGvNZ5ZK

5s5Nt3ekc2eZNL9vIzPgA4EGHqvazlPqQf6dur9i0QmzH67fcM+Wu6Xt4RxDBa7VPBCEe9oQ6WQW11qM

JYdoY7KEyocdKBqoUiWgdpSQnbj61wc82hGPY2IlnE
2hav4J9khkySjHoQI9EFdhNky58MUavMW2Pzt07y6OHv2VTwq0xvy/vrmJ9KDpGNO2ZQdk1UZ5J5+i3f

41KyO284IfUckYa/hPPWppTZVVkQFSicrB/hzmpwkfR+haiGdDwwq35R24LbX2OStxt0GxA08e+uOThv

lvi3GVYISG5TmhItuY1RECun/UE6Pmygcb/MzMwYf1
n9cvOZuQ0aP5cz0G/n/USaoHHksx5aqD5J+4PvfUpaxJbbqfSQuqBy/1IyQd+6rkY8EbLNVK0vOYRMt2

4w50gNAm56RvFg4ecUcJ14KjN5qbq59Qf9U+u7Ac6f5b5bJ1yR/87vvottaVJMmQzIMfREwTFw6U+0oR

Oy9Vz5bS3gRlSO6aZFEhvCy8gZeBR/HkTeU+Y6t1Fr
FYhALDLEwZ7O932krHI/SRY1gmqB6wnFFDtWNvHwwX43SwWIeLFtbQIJqnJ4vC6m87Nr+dAUCB1M/Egm

tLp9+8oZta9WEmIvdLvPh6OvFU66ZHS16q8b+k8/qOT+VM7tefW0T+UO9hA8QihX+zn0jUqE9e+9E7mv

g15MxmlQiq+pmO8PQGggAqyWaRtgDw8/AsApY5ILAr
RP8DvdeAbN74z/bojWRhSIdBB+jhrDeZ1ZDMv7bGZ1UzD3Eik7yo7sYK9UOTFeCnFrzUvt6GpDNEcmra

wt1DouJOYMIS6m9JuJav1KaZkC3G2Huuf/KeT3KPC5iANzIkulicGI6aF+cLZOgKykqN301lIirapedP

1AdjEzgiHbJgt+9LUwSsgxBJOk5gf3GRIyhXxEPkWQ
fcgQ3o/1FWJS/AqB0AW9eB70nCDbEuhb1cDpBlXxQMD6xJi3/SYh14Td7uZdN0Rh+yz3T45YQjzsZwuV

xXriMEyR3y4xjGVOxXu3FrWgC5MwH+TrVAMBV/DVaDlgk9o3jwxbja28Lgp6nY1atrw+Ld2zKEmuTLaX

TM2U+rhmRHzQOxQa2EXjOvZznrL2Ig6XiP4me17k5n
mGq9RvJVEaT5GhH9mjYADGPJ5zX+bTx8YF+5CDVB9GxEyqaHV+TwaOmgN+47UuqbbtdGc1QpQXuNDIzR

ZmmFTpkhvRMUrieL5cq7CUxehMBVAEJzy2vZ/hDYGoYuLfTUiAKnDkzhyW8qLHq7N0EcFAFRTcpFfcDv

zorTkbzkytN2DO60z3wvQGMh8AJx9W4NMWuympMsN7
GXe0/IYas2ujrFPL6XYiq44lRFD41gbEW1z9IurMUGyaPqqu+r8k3pUAuAanhWXVE6Cw1rDyU9EXZv/l

OKVD6zj0TbYC8Px1OTKhOR60b0AFq7sw6oa0HvwXLjTSIkyXMjlmk7u/uob2wDz+yZ9K9JOkHqQdVddS

1EAIMR2TCXuQyFwRFJ1MYyAz5NhtwSSz7xHqr8uQwj
hkY5gsefmTfrfSKhIFFk6MHBVtOa3rkTmbPXvsNhegXJ1IuFIhDm34UMz3iOaffOvOv/677ykex3oJQR

wTWLlHU29ZkX/ve1H5zTYwD9LJS/ggs6YtlbL5I0hmsaXR13XkDNxsXVzW+jz1KSiguOYguFjFsdEmpo

5tXwqaYOqO1wjK9Cnc6y1qw9e7osbgMCeM1IWeNKCu
Ax2CjvXSIQDvgWzmABAGBxr4h39usBkfL8tsTuu1rBd7PFNgee8nKrcGqMwSNsS5o3X/B97aMkBF3Un+

Fwsz4cGoV3TWJzsakDaV4EIPiL/tGVrxGfPpHrYKz4U+mDM6yQJdGne6+q7ehV76MvQeKi8zvU9UNaj/

cMJW9T8QfBzJvIfkU+gp0a6mDJcB7GGt6AG6MCVOzn
tc0VdHHdEO5O6KZhtZ/tWZPVaEwT/zvwePqDxBe+ByIaflojy2S2DCW0U6v7lGk0dXGrXGMG13paRjqv

aDz4oawQ/YHOulv08B8PGnxYjUXemrgMO+qrRpBqPui0+E9eACgO3ckzEMadTLwiaH7TO/tUZjDLhJC0

si7gAv+XDMDsiFuisJXhooZ6YWpC+7Zgav8nkpE+7Z
piTigCtm83DBw9+bZuy2kJD6Z/NpQCq8gW8rIjAbrjwjMk7I1EVFAE4leSA8IkmJj3dGxJxSpbgI+AsT

XD/t7AACzMGRVLLRZ8okiSfn+k5Lt75b1FaT6U/OevwB2o9v5iXigMJt9/i0gecpXWzhi+0GnxZdc+UZ

S6djlAK7vivKw3aQ1M9xry59fR2MOljOLulmoq5Ic6
qvDj048HGxXhXWIva8C5e0tDcgGv+Rx5sx44DglHdX8eZBUfDfCVUAG9Zh4otmFt6GMKAqQGVV0A/Gof

M2H2vijJGyFxRAypc7aqnLDD7B32u9bSVjLurcyvfvo+Dawit/rOX5RJK7eO93yju2e8Y1AdE0gKk0sjP+

O5M4d5Ght3ZUDbj0MiXdBbU2PCjIDPvzpRYJNL1UTl
rg4qsNKG7IfqD6yxq6EDjY7UQifFTTBpwNkc+Fqz+79a8siwSYd6rs0vyrn9qzsREx60IEQoAB2/it2q

P4SwD6ZLhefwwAqiUfcJIfWO7lk3o7YUYoorutY3+oVp5WlrwTdcwWrViAabijRcX2zQbddzVj5CSOzb

9zgYjsmy4L7+9SH8h1Do9TxxjqX3Jfz+P9az1DtyYw
Jua0hq1fg0ig0LhmCpzOZH4Y7RDGDQADnFdhoxcEIrSblrH4KOKI+VS47Qc9TCQU2KnnnGw6UlgpUiWT

Fx6b2L56/Zwvz+hBGinZK+cw9vg9sKkGyAEnmg2E6yDy3VEjHo/x4mULOPDNJpgT9hAbmwoOtwkARDJr

i5rb3ibxfcnWe/4uLW8LAz5Z8n8OxLHiVDd1tMF6/N
vOEtbuD6QTJ1uC+LVFlu0I+QP52EJPmZwHdqcM8x7ki5WsrMiYvp1RoVkanu0/es+G9QZ0ekHy/nU/f5

dW1RmI2iSAkj4d6wYrA07NjXn+eX0JKdLExYL60cMUaeXPX58glB/GJoimAnoLXRKs/dnHVFWF4Yy6qr

P3UIvRaxBCydczhrdBMwfTxJ1Lce8oQxgZrwUtGhee
+aR7YTCo+6mFqTyP4ibUFlkz8SgAjryalikEonHGuxR5IGalRmjCKgj2e0AYS1kX4AzWzhp+JYNryTjo

mpHEzS2m0xKrBkFDGgZJkOrLDy1jSab6nysa4uPdabW9Gc+RXomorHR0xBxMmFgEvEQVrDy1sPAHK6Bq

+vquBJjfD5RGChXbRsj0hrqVRjN8UpXP3xcal550HS
FhxteCkSfesZyUrrz60j3SGqFwtClO6D0hnYB09J1aywg2zNE8Osgm4xjI0vtPLDHCkPaWRerTR98VTv

ArSip5mQd93PMP+ELbxPXSxFqg5wiS/z6w9ZU31MGw+f9f7UM6LqVsp+OYazlQtEbSbx1V1Acj7N1z0e

48Tc2Q6S1TXAZrCEgNU2niJGtoBw4PUlLNKC1oUzF5
Y0acWnUL5+gb36Xfg1a+k4ydZNrXu4YqYl9fOFZjn0YPuqaf0hC3a0PEcNTAJXvwkJgOdE8KXFfTG7Ot

Al+I0F1xZad7Sj2vCNK7MGn7hu4eybhhBoVzwfU/LkKRzvZwXK9cKS5/nZOUlHfmkEMbJL2V+n3GDP59

522FK7qo/+F5LVyyTCjT8oEsdcdwaL9qqOpfEg4yQT
aXJnVMAtvwiPtwXC7W5B6UNJaKAWgq4b/lSFL/zBOv6QRfChMq0jb+GDk4HaWNS8iQqeT1luSAqk41MS

uyirlIA6oW9ypbslWVmfsFbtyMhbVUAxqRNn/IE3dudLoefPCWavRa39nM2L56LTdPNHxhZc08t/iEw3

cIasn/gPb0kCfWaCzHtEW0wNRy4VlacSNosXOr4pdx
08p54IMc25E6aabBZMnLGo3nDXU32YbeS/Jch8IjNwvo1A5zCvyvP+0DNIF+SvhW4oXMoKEHgaUQbtNC

0oVHxSqA+O+bi9WG68Co3bPQhnUFDONRQESz6LJCijgYimEu3vKgm9kK+TF9P+qpL0NpMdzbFStijq36

MujkwhepUGxmkAtR8gotmkJ2KMoxETukm5phmuTC4G
gcCrFwyC032uaqEAuTQY3SPRDcLiaqIedIBjpioULwhNwsPdIbDxhjaf1jxqV/4Jkywj6mrxUnwIsDR8

pHSkTTPhhTvLQV0yVyc9LVQH3ljpqEABPtfJQnpDM92K2+VHtoxPrSY833VMyxSqgwT6F9sJL8+xYS+4

p5GgydeVfno2kdDTUG9cd0wiPNnPu+mylXPw1JROGo
RkGXIwRIraK5gg1bOHKibhXGspkf0BaUi/mzffV0+22SYiL6rZAA6dL4c0HlMWMqW7ZCaYjHDhJjmyfN

ixlsRwUJbMLr9SAfEYHSUkuee0hUmy2BDV5mxpsDy5iyJXQO46LIXOUbXWxpxnHGxiBQlRxD4l+BxZvI

7j/w4IT8h4qsuQua2qNaaa7HjfMpuYzpYf5270ZDAD
2mZIEKa336CS3NT+kGEgTZd8yIK4m2rtbJqbb10IF5wHRsoyX3D5IddyxeO58YNb/QvEdGjgR2CATk9j

PuKodCmfDD+YRG3UmgxqveH6bz+ldM7z8lOCFm/htQIHhm0V/DWNmQW9A7w2y+yzxQkonar3al4jkp3/

wuae3ZDGONpRh9vZ5KP7dOovTkVOBUNd2ojceLgfQh
gjyhXaZpmPSdELCvU9/iuzlH7qBUBxQZdJYNrMpVS775Eg4EmOSXE4VWrvJ1ALnGduSWGliZBhyiZ+Sw

x/3NqOoKefx90PHsu/svKbX/B0ATMpJCRDFoUQXZ3j4pPHSVzH9mRZN91ea2pida+eW1lqWl1Bt/Wf7D

KUwaDVrqlV+wEGNULeM4/WbziFEpuKZeGYhMuN0lxv
bUZdPCneDtTgDiPb4cXkew0Zgo/W0rVg2adGbQWwkkEcvjV3sOUO8bgm9G8tJiFcJUacWTX2MLfk30f2

YOtpCKypEBbbIC0qg/zWBO8L9Xb1ySgQ9PO47P4Z/kjAN+xx7OOVeJNkHmbe8yINIZGNdIZVXEZS9TzF

6jHMAWo3fwAIAx4H5MuU4oFEz+nRRp15IboHNiIRaM
68LLaHudlmFmrA5fo5oiytZNBp5MbWBd0jO3Ld+sWx6ei1p8PneMs8jmDzamFLvEytg9Hk9e1o0MDoHa

WcuIrn9rx78QQ6mtGMnf9F7H7U9sYx8EXE0/PjpPfgYKMZUHOyK/PX9z9fVqnrdkwVKvbp4eI4fozM1n

KnfKejPBo/hhpMQaHeQjkC6KuXO6w8q3T5e5EtKpZ3
emUiXRBGZSxUDm2fDd8brWBWJUus2Mwn79SIgpozXEgTlrn8swkhyOkqIz0ziTPuoeAoBF334YXs5G2f

tPkbfxLuS+Wtk2oPp7YwtQ5DtWliEGzxiGKjYiuGLhEsITVWT5g3igObZVAuXYltg6ndbaGbkW5rEiEb

iufYFP5IndV/RQmljnU2SuCLU2lJAn3uZGemrobfuh
GPS6o7SH2FfYIQUXRnF+LW2csgI96MWvYhxDJY/dA2NXhtqIuSgmZn/cAP34/zOGOB2/taJ0mp8ND7Qt

OCeN/pYWUVPC9FAm/XL0wXo1EqoAbba/p7KoNw+I7oXg/3oqCn7/bpG0kPfAEgf01GoffWbHPYHgLw3K

zcFvvUvo0v+7fMbeh/nFCpMKV8ckDUEy1+IdxizTut
4RtEafjCUX9r5TntvDX4Tqe2RNKzpas6Awr2WqfOk2CEpT9YV0GKji/G17yyJ3p/1UW8Jff33sDNrW4w

3ciXvwuzUvJlUcqI7Sqsf13b0gafiauMroXHdjNoIH4euT1AfCLgAdkpfIp5ny4jyVqBIJFr/ZAmXvyk

aGjcpYMIVewLZ+iqLeWl6bDNHC7GNdEHlVX09c+j8E
MF3O7idCdZcDYxUrDQsjVQM+9ExTxqH9WM9H1vpcS87zHT2q++htYrhFROTBOPa2D8GQphI/kIlp+Gdd

hGyHkonBTfssP1whBlPzoSz0tw1G3bNeeNjKp6qInNHiG1IfD5+hh54mUVVvQG/vxEEnxsHkk+tNWwCS

8DOVzL1g+sWN5Sg5pkiRegtSn4Rr3VfnMXMe0x/i5f
1pUznocO3aaDYToGmVldhXwpHIfg0HWGVgzdEXzw1se9SFJ23bK0XUPGswx24EmiGF8Wde+JJ62yZPUo

4Wmb6oJqs97xzfSIcb4ZbagUBTuyeDAed5yWmx8qyRy9ZbFI4mU/Un33VrB9ovI9AGh/FFReWOocIGVe

606ie24Wk85yFWq5S/wfpdj66LwNdbi9U4iePaBBqB
tTlel3Z1QrO8DbaA/69yaRiUyTq/RDyIvTPoZTcKfLNEkkje1OEyn9+gH0saA0iDOBKLIE+kYe6w3WSL

slfeIhGPa6d7nTXmEm5hILZdgUKhhYIvyvl1HD4bVDiijYqjeaKvlPggYHk+8HNDlcpY4Ix03+4tw+GX

wLna/CoCNjtDIr3Ies0fdwJJDEoVn5/kvOTxJdsQif
sRGRhBFsSZXlfjhpD+ti8MW2F9Cfw5lh4U0cvU0ZyyyZU2/grXn3CpUR7Ssf8EbFKDFHTWaIXRYL2eHT

GlJO9ZGOmbOO0WfYsYdoET/QvP5mMGh070Y9vHY54V10s9uQfCPf875fO6HymFL7avkPXY26eQ4rat8y

y8jFaLnV0u1ps9uvDnGxiR5Sh2hHOXlB1/AmwRBFxI
C2ItZgWVDRB7kmwRKC8G5InrUjZGPYwRb/IZubOmYphnPmEin0sg24YXyVaQl5w5/X5K2nyXYFdf8+1c

erU7iWlupITsEa2STQTIiBuvklq9M87VLht0Ys4w+Vco3NzWZpmpTVS5U2NLkDum/VBFbAV/dKdF3j0U

k0OvfSWJdEnmzgZ72dJzpD9vEqUbjjHMb4cjkq5VaC
mlVETCMJjXXQAxqsw1Uq01z7MmpzVfdC9nMCc3g5SyG1OsuHGO7bC10Y1NbzMyms563easULeUoyjegY

nLBR9ONWL4nNTHEECgWADcLCp/K29O6/eFv/e3r+jptYuU+NM+oRAw4JyReS9tdSJPEjegp99tjRc/MX

VxS8O7YvZPtX91ONj01DLNRYZ1VJ5byTh8jh6PtRpA
Gy9JR4DJltQ0k+8X5jeulratKTNWSYRP/3Yyo4GWQCJSlgN8dj8u1pjBPXPYkGJAEraJsWhAxmXQTX87

KtyWRDSEXqjeaZFAj6rU9bnCtuKVcGeR2ptJMMUtL2aVgj5IMMtLTUQd+xkZ0Vg9Bs5UEpK19zU7C4Rg

6qZ1ldCbTLSEO+ggI5ElJfB7XdLiiLm6K+jSrU1Y+g
hB/ofLa71a9nvDiYOLiHxfRpufYnTPyxpHv5G0+CbxlCyYsU87WNaTyk2kOcP4AKof6RdSU4eeH6bfdt

gsFoQkv8GiWI0ikRpCQ6fqCpGE1tdgAuz9PX1DePEjUmIO2QedfYCwG7OD7XP0MBIU/S/vMfZFY5me6P

FoRpqLXOCI3+qZNavnpC+umctHb2alqMUsMF5QXlTI
REqVwl+kY6JqMAtfPYj++SHojgeY1G7o+epH5ZnASkOgR4aWadVvQ/+P8i9D5gELHlwlVBSSj2O+JzBy

Wh4ig03QZFnf29LmWdypf7/rjAb5ICYUvd6W75YwMOaVzf6r9jpwTuiZwPsKK+aCcCnHwz51nF6oMbh0

k0kX6Q+AmZucyFZ8bKX3SOJLYIij6Du+6tQV2weSpm
bSPtj5wYOhMSyCRzchXFvAlqvTeuMnQLGjF7xbWd1LBTuL8hP0cR6sD/97rG/H/mRlgXYNdx2J2rRKQl

3YZmZeCxlXfATJV1w9jCkLXWJ1nbtes5lGJvkib8XrC6Il7CTcEHKr+0Ml2R3bj4EMQ6O7sAWZCFIMPQ

LsMd6eLJgGKfCxQ5IAcf1NCUFJBamn2BXOLdT0iBAL
OnlNyTvbsPcpXRR3Fc09OUkymc+pLBG3b+7W1vkMBNaDIsWlu7hZNgtTA+JFuEsB2x3EeZ0iTzRbt4xG

JyMhlqh0d8QgeG7Y9JIbBKsk6CRZDeuR5Aj8+hFOoJYjL0iR15TeuQyAthH9X2gH0gJtB5yv0hBiaDcp

bCj1o2JhJkpGdZZUtAYxLuid4FRlDKry+HGu3pnhsC
uoeCCJbnUzdtTZhJfTJejDLDHG8RYNBMMHe5w32WNm//E7raH7HykK8w3noEhQ2cl6BAi8pvOC5qo3kS

ehhofux6D0FTSjm5Bkzi0pv3ycrr7LpN57Mqs4bqDo1eLtVuEE9D+56Eyp9GjyK2TpY+V18VRzBa3M6y

C9uaFs+rR59BZYXZ1okNj6UDckCdeFJ6+4Cedf8RP6
bhxBDGlBqq6oObpGYvj3rFUyvMDfrkc2CVWKNFzDOgmTdSv+6Uezx10JWB4+ho1gIAwq38jpms1XSfaH

hsv/AdMREOMUdLkhxbl4WWYk8PJykPa+VMIi/nSLCUrzE+o0EVAYps8xNmpH1MK7uz+eybC2hKJWbra7

kcVo2fjdEbMalaHCyaQfpx7qOB2sO3QeEZFC6j8q9b
eZpR/eDEtaeCjdvKXZ+UGGE8tZB6pV87APVZjbZNqJ3Zn+dZJONToQqOKQacgVhuJt+bAwoXGlkMXB1I

kIaVLq1F4CCGxmg4l8Xop9K7G5fS5NkAyp1bQ4lSxzAD3LdgiqFSviKDiDw+iBj9d5q/kOhBvvS/zkuM

pY8dTvy0m7WSlljbBGM00UcTql0HuBzX76pfURnj6/
lnEgvTHD+6LNx4pbAFtbsZfGKpU/pdDDWtBgLztm+b8CGPoeyA/AnRVTxylv7SpCE/A4kr1nDt7Ua0o3

nFlkVo8ZCyPqOuOWO+Eyj86Ob3aLs9Nz39QGDQ5QZc1hDZJ99j0U1aLQbmJS3Kz51/2TJZJlgmOsGZ/R

l0VdKiNz3ybv/84tbw1xTLSPuIWPfoYWLJ86L8JltV
JAd8YvXpxW7xUT6it3XwK5E3fw4u4Ccn2nYlR3Y5RIFnRX0bXlyTJT49rjuTzXSJNYQegIbciblCUFiS

WTB2K/dvlm/lYf8yeuVNhzBE0JPqNUeWdql6QBINFGM/wYvHAHSLZspFEzRWm8h1lvkGtHZf2sVxhWvG

5zsFV6vNpu1HyBR1dONKyUPe356N1GEBlrzVDzPxzX
l0+7eBlS1LcIPyIxRXqgBtd8A2D/BQ6MNzE8bRdEwmCasVZpfSi51LQH6s3EGWe4ITjYVAS02cQuhtm+

BJ38cBSC5U01pIWJsHbGTiO533qvi54jci6o59qbyb0CfZz9SE0X7kqksLk3Vn/fB7+cDde8TtJ8yUgX

Wzza4cdO/A7oRRrM7ngoYyrgpqyJT6LeTyxBgWeaXm
FU55BWzbPGq+7w6T9fa4qa9S4TQRwv5HrnEvDk2ok3MAXgD4v6xFYqf+JDOmmes5NYh71vKnptS1qK3J

x0l6XbI++/+GqpViMuMhMqow6a8g+BELo6HRg/RhoBjUPkPTfwtdULssMnzFdstErR0K1i3nIIL/m+Fz

m2uYM5IcnIYZ2nGkjvWwJg/DykdRvHfHeG9P9dMpj3
14a86DQm7jXpe/htUBwMzPzydc2Ov2VHrm/TN3LdCB2QBb9KDN8DQQ1eGM1jp9M/18S++MA6sJodCA/N

iCC9bV99Oh1jOJq1ToTgmkKaEaBRSJNc2OvsMPuj3rt0fUscYNljJKO7+vCYXkU5doDvxURmLOa1zGY8

w+B4m02h8YHvqPTdr5Lu5uCQ4Xl0UN4l6PaIZyAQnt
Up1tEqJjnl44BoL+JWH6+ZliVep3nbDNsJMfv54LGYLoHglmLfvRXo3ld4I/Wwwf7RzZia0iLsJaxUnM

rRR69beTLhuBnwHySIs+ZDj6OYOg1wUuA7eTqYnAoed1rQ1+kl8QFkd+oyrGujrYdqBPqkuo+CM9n5uW

SLYp9LezNK2nsuD9J7IHj15ymOtunZQosIyIPMeiR9
bvAXsqb6DYqybEqxAp/jh69+2M8SebP5pP4ArCueHzWEsWedbJNg/gz/ZhAij7jSwYfgg8X00RL53iqr

mnYqrKuQNvWha5zTWN4thrp8pvqAF588ExqcuFCR55zLpx/YVTC9Ci6UoRJ6LDBD+4jgpAZTIuwk8f3f

7K/yPdDmJAdKzjlYrysnDg1Z6kR/NsEnFVtBDVkR96
SP6B3KcHS4rlQkCsUNvjnV4VIE23ZLvkhNDrwQMlrpYiNM8IZXjp8AZwib+Ao4uhD3+jbyIC6xJc//25

7+VqJN2diu2bZMaVceT75XRpz/yJpUJu38j8cLfXtxViKG0DEAybkTV/9aPFs//dUqi2DePFq4i2l3uH

vxdl845dfEux9JT2I8AOZ8Xrss57Oea782e+3iOdbZ
dQTWiH5vg26rX2iX6WLxZ0rIEhWNIEbNcIwQhiYpHSCNRshQT1zWcoOp7uJbbhIyKQ2Eqla6Gs1Q5Qk3

E8UQ+/YuHcmEBOu8hH+xmPkG2O6c+CY33fhjLgroO6HW8dT4vy5tR0FPdQlFjl7Ch3tWkBUW4R9Bfvf/

FGWld4Yfz0dz9iUvu7P+j5pRx9cevn8hhUcz1TobFL
4pFQ2U5ReUwRW7Iballzzp0PhZK/US1xxM/8qW7ueOPyhw1CKOqIPNUjKHS/qFCpSIgPgmVuiX9s87GX

JfnqaJF+1EbSX/X19FbqUGYfU9tvTGy1RY92Ehh+sKA9bdbal91CeQcQWQjjgZLFq6pnVpYboKWToC47

/zkhGjpeCf8X05jXttSod53649bi2a9kCH2fpMfLhj
srqvRS/8IqNLO1ALvTYDjfQ0l2e0Oqgj/Yz7QWCwKYU0aXYKerbG6QxclNEVpr++HWlI6PP707yfXC+L

Iwf7XDkiqdzSYhZ1ipt4c/awDRJpU3Y1/ygeVwLOLSjBpOdbxjvtGKXPj2QZG+kiaeNDaNgcFeh2l4Zr

n+xWCzwCculvlKgI3MBCbKnAcY7LGZ3sQ9leq/SMst
97nx8dIsckWhv8Bc8aN7hBlwDlKeHGqcE1VCN8RYPkyWMmRl/Er4iWfhjy3D8CSy5AVk7ZdLRK+ERRGg

N3nBvH+zG1e2XjSWpksk21EohC0NkY783J7z8gCjjl1jhXZhsTFO+JMQ5n6HwK/WHkuwTH69DA+CYQ5O

+HYez0S/2Oy0Reuk6nbGAe/3ml50JuiBgs16m0g5xZ
XGqzuWrN/6eEx5lQVQtpUUJnLhQyDrJMvcadE9jFdifRQmbQBT4GdXy1Vo3NbPH+lr1OEL/9VspdCT07

t9jCaZjk2iJ2C8FawjtpF3hk/zjkxnh+W/z1/rrq+CpVA5QuopAKJUe6h7CJir1c+UZPivMGsbcAi6M5

AA8nOHdRQvvyUcCWCJwre8RPMe1i2+id2v3gjEF5r2
Xkg601W6SQsJNuRgX7XsBQTkqGgFpX47HCjvlVFdqhO4FW3dK4vh/h843DVas2sk4KMe4ONiDM8IW8Ue

NoETwpkvWHbblGzV2IuWHgIWYFUDE6bzqMGgpfTTp9i29U3j8H0mrq0WHsGxNJq590LaH1jatU1qjRBO

JBaK6+BwIq0h80md+zQ2p2PX3wKdssTRI0O8O1AjwV
aZVfNeQUxv1dvt2nzBxHpBcGBXSDuvDG4lZD0l0OLhoKapbmfsRIl52NCgfeUQ4MV41QHvAX1vznOcEl

b2jLBijuNlfERsbHJTCY+XvHc+D1OEXuSAQf+14qT0SChSJXDC74ccdJWsfSp1WK3+BC646iNces53ns

a4bDwoXradUSFXrTgbk/+SSoC01o6KQngggtwWQdyj
cYgn/lkJ5VLKUwuyz/E3q4rw0ae4JvJmjvHOpd8eeVRuJ7UiaavO6Bj3I4S3OWxQHHUn453fmby+vdWJ

Dz+zHP9DRs3d72EurMe0QmfDdmlSO095qR3MMAisSLw9E1UchMUXOT90ZSFrHFT3OlFdytA6maCzoBuS

cuh09aPp+aSpVVRyU47hQA2H/w7YJ+CJmy5ZZX5M1k
zB82Z8pqWyMdnvIX6cPRPRKgJdDrVffqCL5Oc/ndikx8tqKzhco6B3XK0K3dgOuhkCi/FTTnv0Zt/Ygz

cGE2IhxquMljxnBxxINDuKNe8v7qXWBvJzNXnn5hWL6n95ElDcD36lPlrLykfnhuxp4MF5GK59uZ4I8R

xLpiqVvvD/FrELuee2HPrYVnlhXyZ7+DvqQRsa16/i
wsePEIhWbReJXSHEwgYTp9j+I0s2RskNNt9kCm0UwF/w85IfKU6gnu9/uO8dS02dMkvr+J9UttLDo+jm

qnMEzc5alqMkX/47lPXoCmwOvjf8w7MI+JUSa7TTL8D5ZWr+/RFPxpSzUvgfBIFVOi7pAz0s2eVf1l5m

SV0gP2n/WGrLuquaIzCExshQwU0VY20U3AFKEa1NYu
70SPQSPwGnY/Bn4uQBU8ma8EdeQor3117TTeozvRdLZoYyIMrj5cBijpp3g2rHK+3baoZO4DENoJ/iRM

UF1A5dJt95Uv/vyPliyAqX1Cu1dc+4IJiOgC9uoo8ECOIobZZdQ4ij1ZCAVGfutf926nt7AkO0SMstB3

nH3qjvnHboM5+dvTxrerUKzFeerumM7ZaHgYIJJXj4
wQMqlktEiG2O58gLkSDuiIDz0I/OrhMlP6/4tDrYbcf+vtdI1Sfsb+lUxSxSjM66FjylkxU4AVAwZmD5

x0TbhwKY9vKPojlrhp/fWP/mn/R5+xDFYyUKID3SwWyjJCftOG/xfu6Tk5TncZfw0UpnqsplG2+f1RRS

dxD8QyLv62q4IlAi7gZ+4qOwEUTDA5yxhJ/Jzet6UG
YZCWgDx7m4DpBbGSAlf30jiJTlvNYs36QxrBuE0zvKRP9iAUUL3zNegN/DbXHZXbNyXSgLfCJzV8Apdb

z+Bd/5/YYHIVe0zKXvAjT3r46V7w/KH3CVXDowzzwav5UoFB2z98y0ToisZq2NMKBXWiZiGOZLuic1la

RnrwOTDDXbDCTaoJvp69F4q41PZGv4Ec2nwaFz/Owi
CqRhkvdzDfhNjt5vdqX9JYgZ/KNIvLbi+fkHxUzUxh5J46ZDwVp4wTLLUk+hJW6xkrlfyHsBLf5Yg3ds

CMgn7pGzb2zOjw7+RkbzJVgkp4RwoLEwY0KHXLuhYfHEx04upjNFYhqKo88dfXNY2SAncDAnW7w12nbm

bq9FSC3ThfBBJ5CU1my0bmlk8S0zM+NcEDn0BR63aU
FiU4t0JCn9dPGoXxQs1WElxxyNn2luNr/QyPSZPrrZssWVTd6In/j7BnAHUk5DBnNilweG8vbHj8xtHA

fzJ/B+agqVWvnNUa3oR4VoRvfetDvvennLrfiDmbIaIhdmmBdwSRe8LbLZiVYixsvJwaF3HZc1MZPT55

7glbTAthm2VFOUuGkHUw4qXT+Y0uv+1MXoOx+iwZRE
H0v6RnpMiAnXVZ5cDoxn6Rgoa7CCH2D3cxVKKRmz4nbAuz3Ql1w7sQj9AjN6wU3SxOpx37gp51SkCvOk

mOB74KVHVhppaLgi7N3ViF6ri+4dIb6tfoMEyGtxtgCo1NrXIkKsrkAmBMwZKJ2y/7renyxpvWVUy9ju

FhruQeAj/Se7Cdq3iB9BVxTDES3xNAaIr0RTJeoso3
B6f+3nZlBYFOIOuXopWmWpAATYlZHhMITsNLsM9EBKxCn5idUuL6bKoavNyhKhB1K7kBA1W2Pjuv7Imu

vrR4mKQwWteY+qSkTeMjLE6yP2qW4sLaUvMF8XNe4fYM7qb/WE9QdeORDqDV8d5aKSbKZqziDXz8wWc7

Nzna4rmuwDiIMlXPvWKlWzBCuwZnOptLjbM1xJ0CnY
0zJdIJlx7lT97R58knWThVqUcAw5XE9EO7XWYEp3oq4YVRYn4cMyp4/+4UjOOkQv2loqf+2Cr8X+TL4V

6DLrI92K+v4cgZZCp1joUx9k/NQkIrmX95rFdnWkAyYA9zwzH4Yv2Opg5igNlOko+neJZfhQoVGCMwN7

YkyB2m/J9kCOrjYEuwcJceaLKqltDpfxrjmtm8H3wk
PD6gsdo00FzGDqQIicCJ9ZUSjAf/wL7Qx73wiaN3Hg/pHYajeYj9LFHyx0bcsRdX1dsdxUCmr2kMpF21

MfDs4pnt703BvbRKIBlJX26Qjtv7XYjfXqelYHIhPiAzaSu+oHUoyOCP326cDfxgV8dP6XYgIe+ACC6k

UUZLsZBZs11o4xs6fDKTgKP+nH1V3Sz9PbrU1KVg/Y
6bnLCx1SlL+AIU8X9lBT109fasUGlB49fRvEtjRK7QMJxTdl60ojbTj7iR2pU85NSp+mkmDzzLChnlyF

YYpssLwjdwTLhNygzpEuy98ahFW1hSaoC1ifZUEsfNwxrtmxS52Lj4Q6AHllYXqfhaE4vl/MDtQxKFZy

2uoj44p2NQ2h+UJigXq29fpiTJtBz1tHJ4Uy1+Oa+u
FEQonSpKmb/KKeImCIHl3IanZ7iReJwa6By6rwGrBV4EaEgUfqn3dl84HzvHGaFDj13WzqAZvn+NCUBi

E0JBZm2A+PIRwGlmjCkO3wnVfgs9vwxE1Bv7ut483s6LhGZq5n2bQArVPotkmwOBvyUxxco+Q0Kf1O45

Dhn7lq33mobCG34/Ze133Whw/fysYNxiPHQnTehXqc
DHqdBMJOuLlM6/JG2sUGf8HQKS07n+Ej4hKOXSa2zopuvlQto7KyZBREhQfo1WG3JC84suZJ00vld6UN

h85lMdlX9boFkl0UpNvxREveT3nU6NW+WxsD4Q8bAU778gorIU+Q/YS0a6Ul6fSJt7/clXF6GpwTZ8/x

pnlRPA+v63719MRrIHnmgJxJxc5WFltEX7VdhorGCc
zvKSRr4XiAxHZ1QR3taroSlMsK69YF3X0PhY8xslIAU05sKC33mOUn8NxRNeTGcXAAzvZ1HFVwcoTdwt

GCM3tDj7brYSVyw/X3E5fRNB2e8gE1lDO7Hje4xA+L2sCsQKaXL3sxgdTrKj6V/J82Whp4beAXY6oCCK

aZ6omJYcD9dXN2X118D7ygDiDlCpLL5ThxQIx6mjBs
pg67fZ9E5z1vr5n2MaOLZ1RZnhJt5COGC1EoQwW77Yhty9okxmw6D2zT4hf20YyBIKkoAM+gP32hB8y2

ev0m3eJh8cqJYlYRhTEFG7wMzV2KgfEWKIVpf8NQdtj9Imtp2d33SdU53ywLA9HRT8elqS1WJFBs/l5Q

oceYGXi7uMsIfgAVlbcm5Xnaur8uCuuLcqmqXuH7Xu
4HqB+zHycEfQ5h5CxtEefwn2sDk0pbaTwzO/0GKYLceKiQVz+FrPST/v1fCoH/VqHhouwGeZedZJxCRv

Hynn/Gr9xxszINEVGgVy4nncbDucXINw/4ric9rZDiUrRNd2FkWBNAZGJfCMfkXfwjzn3A9+fCmpHVv3

3/WCU0ouN8nJWaNt9lU5qx9wjNXZN0r2Y+2rMDoh7B
niDhDypZnlXWFW9RXkOAT5iQB7O544eY7ZPNwWQNugyJQ07tvfLjx95x+rrszffUIee6itNroeuVI+fL

I7iNL6zO9HNuNuVLrZwnvdOHDtecwTE3B89AiI65TbZIbTKAGH+KzyQ3yI24QNwKmAzq+zf/zhvTDvZi

yFeKT29p1o8h3as0AH+VRrKDbUd/7IIwny9wv3AYyn
Ax11RdIdr976OoGkb5y4zHerpeOoUt8vIEm51Cdh2AvdNO3PYKm175SnEcCBS6A2lfxVwEcfxNyJ2Nzp

S+lUZN5PqPri5ePo+EpMsAvAmEqmYqasSzwgQCmSbVagJpyQ1MFTRbOqkUA6RP60reJfvfFb+KfY5fhZ

w3EUNCdFRHeAPv/Re0ZRZQ76lArNpa+frg/ioP9yFL
yxX5LdCaF3vfBqH9+94hs44DMWGILpVJviZuDFdODw6pnz+Ss5+QlQDgKf5/huf0b+arAT/UHnGwJZjz

9MkszU3OohDa2jc5WTxSDISCpVw/58iY/yzYHjzg3em9tccL34OF4wOedDZVJp/9hoQZc5pN3LtDX8kx

TQH+QQnZMBaz2ifjDthM0nqug3sflI84r12u6CTFBL
FCa1fr90FAvvDi6FMo8RFjAAhR+UE/MKp6naHUnuqyihCYnUV3wY9Aqq3awEtS/C6m9Q3ji8r33Xdgt6

PISHWbNu0YGuJsQcmD1Tks5OPZtiRFIGSR7D9dPu53WWnyduToBrYXminZIRe+XQseYv3o63IqzIjBFK

qy9WP2Ss3VALMx4tvW/mH5MpK7QnI1B7Us2VI38IoS
phN4neqGQZWokkGyE18EgCUQvtadFEs9Q6OwGcuNlEm5L3upnJJeRpaXhR947TDZGyXpvFOGhb87R1V+

S1LuCzWub4jFcrXhM6D4kGJVmvlMEiPqNh4lVI0pdRA+1ICPH150wHGVO9OpNi9uvPHtM4MIvEtSoeTZ

oDcOIly6xstEuI2ZFZbd8c1xEcVbwjxq1NM+ZGuGmP
MqTLqtFdt5ScwvjN1i2r5XIcrAfDYaONSQUwbK6A+0MCqbxypCi3r5aNwfYS+b62hRT29/QogR/+JSPU

UFr3GdaQVzQfUZFzybW31aDfcMutrdMKO//TU9LlhXEzq+iOv5YeHqem1Aef84ulraXq8dgMVNlqn+eN

1ROZWDyX+5IhIPVBH8X23cTOzwI/6UcWNq4RCkhyU+
wS820rv5r9XCsW7G4NPvCzKUe4Ec77twRmoBu/bR2WUvAeTXPToSj34zbJSFrQ3lRxAQBiFA69GbwGg7

vsGRZv5Qbt6mEEbC1VTxUi9pH/SoTRg0D/kV75DdlKn0eBQ7hog8Tf92y/3U09oe0v6rB8xT/eenhtQp

UMzG0GcQogXIWW74+3muxgVEZ/myHh1XJxPvRpfPEM
Gx5JKuqNCE50aS2VqXcotNG7cUwb6UXsem6M+KDqRFdKimehKbQLPqjov3w2OTssDkDQMyWS1uUQ34DV

QeSQ+wOYJsrOtKs1AVbAjfztlL3dip8lrI8FXJsDk/+WWJioM8SOfKEenhDWi92ejmBSX9gv2ucY+aFw

jPfy+9VRrTQfd3sFyA0rSuWF5tz4XyS/bIJV0YZY+J
R7biNk7XepsWkRr9wnRkmWomoo01PwqxH0tSr9O3GZpV2fxe8WLZFDCCR+vEJ33duENViuRoo+oTgCLI

AZOaneun8Kfwg1xzy1r4XciK8KUBpqwT/4peOcb6r9EkdaCHVroT1BwSjtEPnQGAKIXlfH8ymjcnh7QL

efT2j9Kja8zgI6td8ryHcmu/xbzJcsGGKGJx9FVGFJ
Wl1HHzCTcpyZotZ0F6esKXl3c2GlzNzcoETtr3DxhVFDJ2atAaa7+bX9Gp1LOTayinhhw6/fTugtkg8j

1Cxq2kpLeH3VgYJZiTXESfA9aeIrR/WTnFCyaaRJmKNuSQvNiCWrEAJrjJKFgPMb7YsuqgnhUBSlNDek

szloSxlIhiAeCbXOzcGFrsY2cqwhgwn23wWk/viBeT
EYLkTX+dRzPsVdfze3V3J9BRsqtw+TOUvw0bdtKHmj39AUentjgfEu9UcFeK57PC9OJoMp0lVgrdXa05

V0SZBqahBtU8lO5VGbSLXusETJK75yO3Wa2X7R8VjMe+E6ZNbpGhMcc3fBVQXhq00F1XfLsEIIy05TvZ

OUCVm2kg7YNWXkX1XmvAelog0M6CGHMxolUCbRlNxa
le9/kjfrK3YLNdrJKJ5naRI5OnzwFzCI/YxwWPtOQi7TO/g06G0bNFxyt1YMoH/KDc+3jbfw/8OWxOfZ

1Q6gRR9rHlkJx5hf0UY4k/zr00+n4nuCStbRHklMmgU+LpRzI9C7SGchppoDHcV8dtlMJIlJOQ9ToPxL

l1uFKvE+uKVzmGTb7thNPOpPp784DuahAON1vUbMXj
nY4gLFI6PN49HhGLW/MVLNZLkA0OQItgMl2zU45d9idCF8xYCKDGx9+30sz3v6S+1r12hK7XhWfTvW9c

DlloZwEm3aKriT/N29LYNTM+Xvy889o+OvHAEhz3C2YoiifZbDzNySlZVqDTyq41Ree8Tmt+zghRSbSZ

TTi2HOOy3Lwr/OGLIWeEKvo++vzpwdlUUFhRiRVWby
X461p06B1xE3dvTtMTulywQcR2L3/Fwq5+BGHm/y93HJOuSfyCNK0BjXkWWUSZcm9+2WEo0nGMm7eh5S

qWloqR0YWdB8jnAPhRKdMy5hzxlgvIk38yEdajaYVV06QSNYhzY9UriUc5jIBHGd9ylU9s3vtbRg1IVT

X3b1BLscFO+X0BmUSOe5Y59sDEPaCpTL9WZifp/jMf
x/XfozMu9KoUsalKOQ3zlCOdDbk37nvaGasQkHRAR6AtnaNK5hm69ugZJzdaeOyZSPdnQH/E2WYtbQnZ

d6/9TLox7YsrcJgwh+XnWutdc8OZSDukjwXPgFBqXVLvX/ruX6omFiFAaYHNkSV787hV9Y7Ut4OZG3Q4

NZsHTvSffZK6U5P0eS0VI59LguG3jawMRg5XqYD5Zo
ZBrhF9oyfrrBwVX1BbYDwM1iOy3NYRQlnV5SW9EVOMKJTG1KuJ/tuuzgkvmgQf0piS/rZbnFr7WcF+aM

wUJ8SYhJjgv/xd8I6cEJsV+nrEbX/FdRAl3Klnu6QQxga+PLgT73fHQG4DJA4wVNvud6HMMB82uvOewI

KnRX0ghB4NtkRTE2AFZWEiVJCFgBhurYrLq0WiiUOL
T+ROmay/xmQ4PF+Fm/+9p8Z+6gRYhCbEPik6ZdVF5T+1rjTy8kMhnNk/hT5nzZhB3nYRNUWWsyHYxHnO

n0LS+tAm5YG3j877gwMjwJ2gj4F+yeIagC+FPzd6r6xJIx433vKs9keV+t4iO9JTtz6bLdY6dEEjsqtH

JelFVxb8d811EawuF0LdKUmgylukT1cRJoqLirtkyk
uq+MYzw18ACmA6zmGy4lH7zmPEgPcamlVLoZuum4uVOKiOwfjz8BH3MmcuTMyIDKdXYNm8D4iUJUc1z0

ODzSFoTmpqsGXXcv2kDrzSxTN4982+7kVNMW26coq4avc8/5fXm/8L/wv/G2DpXFBooOOSqTb/z/HU+b

nMS8b+9N4hlc8u+iuN+DTzpYc5l+NlH2kpi8Dl5RuS
jzEImwRMwUz32S+rlQrdEnrFGC/ta5e2PkPWLzFJ/vMInuV7H1xza1f/2XuXORU0DA1/NAicUuEciJtk

g/eRAscRXU+Ucpbe70n5sEI8OByJCZ+mH0Ua/8SIguYlkWrCeoWzZEQKyt7DGn1QA9xo510VmLlmlVJg

PaT/JFlr3PPLtzsqPaadpDLAMR6hd3+hlY6Ii1czw7
4dgOxPEHgm9vBq0NZK9jK0IKa064ok3/4+cBiow2HADKS1u7X64YOCKmqKXz1M+tH14BqaK0WCtC4eJ3

F0OMALOaVWyRW+oCK2VnhiGZ9aesuBI4pxTojTAHMNH/7uMCLCLDcstDEiVqnhMz5OR5xig5ITizVm38

oAYZ8jCcOrqf8ZyP4vGm+mCQulJBF2XN7Kze/xQ+Ua
6eXXKJgzWmJsQLZR8NVYEhMlV0oy8qsoAwI+MOX29i5S0O5XK90+blOJUcYMcgmPU9HwlbQHoxydcu2f

t6LbQ+RikF7cB6XKj6p3BA7IK18ZcnpNzKCkZIlTWWPUojB9VwtRrzwCd55CSHX64AKZwly5HT708Onj

zuMBFn3z/HQq2uPFvkEWGBzPjN7UbnTZkMBHxjDX5J
D318DPBXQ1hpcVJwvWMEdKo6Ld2PlqwPu91ZSaMtSmkGX1OpYGGV1od/9HdRx5TEVPEIL/5X3DEVMD4f

W4gz3Hz+G4rxJodS8KOXaZV3nhDSRVvfuuljB4FvC33AZicnboKimEptcqFg47A/DOi7rkG2nlJgP3ov

XWbJyLwt8giQsfbwt18wL/wRwPMxSUQmeP5TGrUt5b
d7Qg3sy6dHYChfNJgiEyirmtDLHdrscBIcIO12/XMt+Ng8UFs1iwyAYYfeDu5OXAKWdE8Ig32+olylpT

6IcQRZmEnM/ptm/9GM4T3YTDgdyHm14F81SfhxDPrk3wi902rE1pcxBsaPQ/963kYYmbNg+iibSbnH6D

iSV6iEeD0d5w4ksoOwz247zWvWBRttlmu3lD7frvTS
PKPY6WNNv/usjDHz/lYfD5m6CH2kMCy/1BSIhpmLbld1SkXPHvzJ6uOSjRFeFXjFI8PX6nu1rTEtzJ9p

HDTp21iMZ7/MZ9p6cTofCITySf5e4xVk+dTjYIha82/YeM0sh3K1xxgbALvoYJ/d2DiSI93v+12Fvind

kdO+35Lr/lVWk43o0LOkWbwsmEle0A5m7eVm6chVRj
u4WlmwI7UWW7Rf42zyASx/CFfcLbsnWF0FDAI1xzC8LjN8eLljY0EFxADI9zCz1A9TBr8/tW1iQ44kNw

MzwX5XtFiFP5DkkgbqpCCgTVRv9zKbeRZbmXtVnUYxlCNqOBRE41XbIPjwuaBBRX+e0j0XKMi+X4oerW

O20R746IIHlFlvoRiG5FJygyo8tbKoT1T5N41RepQ1
LY1iuXIBQ6goI5Nza5jIHf+2JDjTp+JO33OnFGqCE0MCslIjcHCHGFdJmVUay/iaUkz2EGECldqDWGtJ

hg9M5aXEXOCevqIMS/YuqQx53/ru9hVPQ8uHp6f5aW8wcyGi5YJyg64TCGCI2RRxT5dai0+dpWdSrfAX

rir//p52N6RDPOkBGH0MJk6d9tqyzbmDIBgy9KUPfT
YKDivA0ZIshTBWUYlHwemZKNAXKjrzPoz524rU/gy4524dxezv7LPWw71QtXkDpx1JCbELMuYBNWIyae

43A/Z187bK95hRr/XN+K+Dh+xiQlVfBduoOFpF3c8jDx3OLG9UBhm8zzSUK8EOj/fGwwEP1Ko8cEik1H

qiExkuWnh7Omi0X3vWRiVCSryApKDIVNeTyHFqWPkD
xS1/DyYMVAVbEom9lJlHS6Js666i/sJBPjRnN0ij+a6BkBuNoMV8e6L0nKLy6ZbW2iKTo/K15rR5A1hK

zIxivBd0plCgkoFGC9KdTbVjOr/DZ5LRtXCTHVlSF2xfK4ATX2CVU73L0627z6jk8D7OWQrYUTuI3Jm7

6VX21+eyTJGe29TtSqOprm9C+VhzrJfiQ340HFWO1H
56QMIpwRhbRmMnRzUlAjCXffHwqF5MWMJCS9eAtvnqqGYX4EFlIgEzyHZgDh+j0I687DvUpgqPgneFn9

3+kCrAbdrzLg0xh1jjl+bGMqoz5649NNCRDLgBWowcQI2pJlCW3tEI34/MebAd2IzFzspF4krauwRoIo

ivP8XQ/ZYvQbN0M34HPos/M1b+z132P2GWokAHAhV7
w1u/3au6ooQcNy6QDP79Nf3YbCz9ui8drX+KhsQamFZkdEJCfqKn6fuRRgebFm7fGB90eOwtWi/BrwCw

e/CbWX3VGc2VQX/okrtEPGSmKy298tbI5kuzg/oP9B/4P+B/0P+h/0P+h/0P/fQf/9S9N/95Trlphaak

+A3iigGYwTNqSrtevPrb0K9lLxI80FgdVzv4D3j3Pi
6lXk07JFSNihBTcaRgwa2xv0TFoD13jMGPZuy3gjJ1kP3N4ohXWFa5msGEjEEmmpzBkcn8wUArp9TQtQ

aFvi8uvtMOUrTc0VGJ7nCp0URlrpqzZDBdvP3O7cVttGsEssDQc8b2dpxhLFlE8pzPBM/w0Hp0cD/s6Y

4RWttzjDDAYqpMpc69XNp+YYgfqc6ows0VXTwpY+oI
Gbil8ieSE/OdVn4Y9Vs4gLNtrRgMFS1BxXVF2aboF4N3pW6eavHU3bU3zlLobz1IsXV6+uAZGcfyWsC0

NnKLIv8jKVIO+7xcd5JqSlJFeBSMKVoLmVl/AJhqReJbwGDoZA2j6Vkq0KhghW8HF0N3jGVAeLp4IOBx

yad2KvrUGjoLbe2alY7nvlQ65xQEA/STbbvx6l/PN4
6cB/z0X2P3p+R2IiEpILlXaRUfD8CUzrrtN6siHTHWK4+G6XTeAv5X1eG3XWIgUyQQdFMLcV375y19gu

Iw5ijnWMp1AccvPrzwfuKBGzLiIoczc8skF776Wrro6GrWtsYYQxusm7Grj/L4oUH6qFA9Z+AlntXA5J

Dwbvgh3IZh1VBgCB1dJUW+wGUDYkqNPhLZsh85aHMA
B2Wa327H0I5cxod+BgiD8xXd5pu4v6rEb5pv3KwknOn58BfAP7ZGmgTa9OqbdD24yZalgWS3QaJrdy5E

WJBghwhYopHz1fuLK6Yealx4jjXpuoPhLsmxUkhGNj0HA32Z8OiHbCsWag7tx6p1Gpldh1kgWCjS270l

KMl6h6OD66bNv514KU3w85CBvHakcYPQ/W5+POdY2/
FuTMPsaZbKzYpqzrLovbPhFzHVsfVUqxoY53+KlIEHk3e46b33ashcfxxxUKdu2pZQUqcxo11xqTDqnx

5VdF2FMCps1uc42qEZf0r2hw3psAWTmVjqVauEWO/oGvhIt4ywS4y9aLKbBjfkdr973Ngk8q+Caazxyd

FHrZUA8wx2Rz+ebF07XUa6Y5IviflVIWiIlBAx1rb2
Wa/An9rRt5NmMF9gmmkb5GDYjR/mrqYcFgk0Q2tnV/d7F+ZD+jnyuFMM3o72bJyMbJAWnpuLXjs+qDyd

LE99EL9MQxP+rp+bvpQ/R2ouRgsKh/yMVnbWiPaJ+bu39kCJk4WsMRokqVaHIy32dCQt9sPM/ykch5oh

kKTM9GPR082sbwxM+j6CGkiJXYyCwCaD80/uyIFc2e
IQKhikxu5sifBPWIC9lNIgmNI3/XKG9Z33RS7GS0vyl7MHDfGfCdiBI0fWuy3bogIvOcml3oF5fUoLrD

++14zm5/FpkvWDlMpMeGICFrxYKroASJHWhuVAjvNdEjuo/zXn+i9tk9xbrGcP+oAd2xH7Un2wgEElsp

srwytgHyO9d6CH98yfIqkhD0FXDTPQc2zKR1Xvwnsz
UyFrBK5mXlTtNEck0C8OPW+F0PcF4Jopz6rvYbgNAM7x+nhSh7itzuvje/rbLxLVG6W6t2qgP7RvGRYO

DssVvfCl5w8XidVvXsuyw9q8Yqmz31R8YVpby2XcKeH66cKt5qiYGcwJyulUfTPDyIUcSuljc+h7r151

iPlu0cZhdFsPyJ6D+0wRLtShujcwJI4u+vgigK9YHb
a6SJJOkK6BSZhZtFCpsiErlFta/bNlg3TSfdsuLBX7ou10ZFiwwn9mDc4LknhYJcV3U27aCZp64z5rHE

yblF6gtGdzbaCyiyzPoNRpJFTCcVC9zNWW55ZCikYA38mpZJE8AMPK6Lr90YnnhqLON8PB+eTp84Zbdh

9e8NhyQ4arPAmy+rJSWMlW7DKv6+VBqqBf74Z4LvUE
IyfsY4NFRRJTYKBpEtgEvuYEc0CNjToTBt3nj9uhsuRAMDgjB/F82wqkuyyFFEo5eYEz5FKXtAHW5j18

nDxtoYgVly+xVmrWHujzUnhf+yLGp/d0cC9Hvsr2dEl3l1FR06IycEcPvR2PsC1t47EfZwPSl9WHVRSj

3nzRO+zzw8rOLZ0MCjrnq05I+R+8cZqRZhII/nG68B
r/twEXoAGfQ7ECqVBwDRRgThTSZEVoePHR1wT7fAgp8aBGYde3KW0w2/mJgrcCFiBPy44CUp94Xz+Vjf

8PnNvi+KQhBMa1z3G751dtwx24BvmEwj+q0cex84LLiJ+BT/GiR6ElcKQQUoJ1i2IGOqOmR4Tam3eWqy

m6c5aCjV9h/lP0ChhyVe6rah6bkkc2JcM+lbMHs9P/
J7k4q1HO3p3rWqSdV5Q971YB9rX4P813h60hWvqTvnS8e6dHIeo60WkexW2JLmX4Am9+QgANYnzLbfic

wfpSVRrl0qINVznByioLCzDx02TodCjZVXqo9IKBO5tYlWXinzcxa9FPFEunsmvr6IEpy+/DNM2dSRQt

SDo57UXEXdTNsAYLi8i+4ojyyuoV65eCi1umBfwynF
BOmELZavj0QISyQzBt6iyYsKP4gOSA9LiYtBFtmbgGK4jDPwi5x42tjCZg63r7C30wq91FEwwzAwi4/8

PdnAd7WFKjR5jpjQ6BGhHYCW954YvQpAaW130iN+S6IjJd60ybU7G9c3/A7XTlvVrpkG4wvyNh5FD0Sb

kcb+y25jNVK/CQTivQI5iINqBbcqmlxVPiyZ8IZc5O
jPySZ46h2mwDY9nEulGSrb4Cu79rYKsXcJWUl+OR22sUmPCHbG9ADJcIBaGOeAIgQ1XA0teuTLETG8oH

mf3VcMfI62h43MvVz5v0Bbb57Y/dHspmomq9Pe4Igw890XJ9Zn3dW3hquc7uiP4cuaBP5vfsFIIMNxTm

ZXByTifOf4XjWaD5QtqnRv/elGxl2ZARdc7ynOpf5+
rXvkjAEz+R7dxMyexLEqKLo8i5UhyWRrHtdP6gpJWwTBkLwlMaiVYEO771/m9fm+TfQyxV0udHOXvszd

dFjTwt1u+8ik5hcLfrYNhlgxmXOC5C7gmIo2YqRb8eK+9Lo8ZDq9YEDQCpcC4heRwrsLuDxKkomtzSFy

GLI34epASj28QD36Tb3/OMO7cqcCOwq2BPaX6cC1/D
yF3ZP/rGzhfyZNqfYfXGyMF6/QuYuKghAWiyf2+L5/iVeoGWIduQlqOTnct6YheJ7hT4Xllhv4p9msyV

FZpBWiaC/wXScplYQTSgeABGTCaYiu7TElSyLpDM4Odg89/UclLecbEmx8RvqOwWjGLCC4ugxvSiHjY9

pCBsQuhq0R+Cy9ERA3ThVzJi8JQEzBrDbWTPnkrokP
jmca+vzw0RStAeYWOhs8h0sjy0SQhmZlC3O/AE4YiQAcGWF/2oMFBa27jo2mHb8PwwurXSLawxXmz6XF

MI+9KZ+0yIgpHJOIEpo9xP6kZl5QbDXJ/kh+X0Z+Av/bjKLkQOme66/j9xTFsTwNJ2athn9nGpxmfFfa

/dS73u3uni+n3/8r+f2MvIiX8yu2tGbVg65KiIzmFr
Y/PZR9IqYEjQzWufB27iT+dxT7Fm8GCAt7h4aXCajj5WKrZUYsrqp2xxOSzWYUhhrwX/vvLGiSAlhJZW

79BmdkfHI7VaFMZySnSkJmDXAX9/ypyDFsZ8r3GpSBBmXfuGkl9siupx9Yhj3tCIm3e840pEyk15WIIg

5ziZMscDpcu2VlAwK+Fe4yv5JEBUWjGqlWqs+4gQ4N
sEqqjmYHdJUd/BMu3MhJFv6B9xihCRRyNU+PEa8ju6eDMFD9AWycexjkab8AyXHK7pwv6oKAl03l4bSb

uc9IYfi9azhVea40pITsgMmp9anzX2f+mR1t/x1qmWc5fr5n5MTsWcDmGuAyUF4NY1cpWTfNXXEjl3Bw

idAvUKYjol1p+lxRKehpP6SDNNyea7AbycsB8LN5dd
7rN7Qj5yrPHpOIpQozrlE9qzzIYKL3QU9Inp1zeBdsGWSlrkE0UIc34TxO4tcPH3NAwRAHARdFRdMFLq

HT4ubTg86nx4D2H+/xBnmJ0UGlwHsUwaJ16/opEKkp6sfM2qQ6DqCrgxDLOBtkVO/i3nbSt+i6CeapQA

twvDGPgdSSYChhtxQ79POf+FB31GpgcgOyc1JZT0c+
BYHra7is/ciSzRa3K0JJMbv+yLrU2mw4KzolSaydM7jT0EsGA7D1Uzb26yVjD9rBzW8vCDjqnMNoQNQ1

cqJtcBQodXWp1jZ0nR48eNQfLyk5MvWjm9SKtG3AD4HfOpviBuq8vdWkl8UEJEqvBAVhjrvyJJWYLX8k

5PDXQPPTHO/5cD4soPrYZyls2TO0QPzv+M0Z/JsDdH
ywOIhM8v0RTV1DLRZTxW1dvU+tcNbpZd+dp/W+tUkQK0gxA8+K7U7ute0BN3IcHwk3AyOIzfM6Se1lMQ

1gHlCLGqh3nBg/Fm0rqZuq10SvOFyfz48nsFdAnAEGJxUwNecNg5ehcwyLl9IOsRfUbTjsGAtj+m5LCy

8a6UjpGEkhU87xeFSboVtGapSvVmX7P8HeBOgjh+th
iW5xwEa4bH7PeirD4fbErlAuPdEsmnyv01qxrx4RUjBbO2jj67Qs1/yp19eBkCFirmc/Kprtwoyx+22r

o+l466nvviHRtNiypH+wJa9AOpJi2+H3on/Vqsbf7kiFVqj02MnCsx6un3WoxTnA7LaRME58USwusFWt

3MEbIJX5tlUHrnZvGI/NKxcLJGcvP+t7fmwr3Itwd2
Wr+Q5olOLHLHsHsNhoMel8/pCooH9wP3XlFY7d53WnW+FqP37/+7hZhCevv9hxh2qsJZUBG2k5SdO0X2

pl/a1XN4nHQUpUMTYgtV44si4pHqeuddkSdPvSnmiplsGwvsr9Y2+L0rhFZIFUDDPNIhJneyovw8I3Fl

K5bz1AFqKp5Me3zSkccAn0HSWGqwHDV7xXzkPEqGJ6
XlTf0lDZRpxED/KpMQyixPfEPqbIMA8+cqFaO12+XVIJFLbeAEnHGseAP0jVr5tr2AnOMhdd2TeKgOb4

Tb/WaqvdTA8M/lr4tsOLfhggV+LesES2lwVYc0dcdqRiklVxZR3L6m3BOmN3M3P4RiMhuA6E1P4GvyyT

lCeixBri5fc0clOojYt7zslVmPbB6wtZLld3pMYTLB
ZMUqxIRakI/aVseZXMO2K4svpKoDchD2R1jOclp7Zq32rnx95iRjODI02Lwi7RIAwXLOb+Y+t+ibSSGm

S0DOzHi7WSZwVcMen8ZLL1y+saQdK4jHVlEjt9IbRYWW7pu0yDVhGpEBWEifGlr/Hp6jzxss1byKKIXi

snb1wIruPGZjHk7vIQqP0q0iwdAe9bTQg9xzTSHXdW
JctK5YxpJjq6BJOiMQCdEBaByUVE6hyaoVfj0OOXka3UM++EHs8/O6hBrOEl3QLVyQ7BOuH0GCnPYikB

AAehSIR1WLrZa2U+Ym5TPsxUDhVgBK4sc4y3HN8LuA9AX5dwVtvFFtsaM+BNGBfLNvjLVCZbM4SLaUiw

yyPnEzY1urw/jQzX8ZDpIX3v6wqLb1jd0ac+FWRGXg
En1s68DtDMDcz5WtACRmuIfBzyjJb1K9Y8pXrmMAinBKOgLXJ/2xeDgOSkKzCSZ5BrCcYswR+PelJT63

M5qJKH0wTbyT9ODDN0kQ8lJX1JiUd69MvRHNbu3X1yri6MqrkczEGBvzXbi8mZH6m8A9h4ZsvzAQfwCt

kRqeg2Ve5c+8+N+Nqhvi/QXUTIPgJC0S4ubU9mICyc
iNT0BPmIAhEi5IPzmk7a1TT6zuU2qXnfmXwJjCYqJyl+QUAXScF3Tdi7G4EALR4gySmKfxi0pHz1fT7T

wjIOEomWetCKVu1WsqqaKjaTymLe+xI0yxpsZ6Ec6HHVmBSH+SJDfNN15Egb/xFt68kaeO0sTWwEBXno

KpQcH79VjSKZnb32NsiQOMGUu17eSDqwtR/mXjryLa
4egC/i2yfz5P1JPyJXu85i6NItBTU+ZXSQ+xUMvSWeuppiOaMdp1uJQN1geD4z0MPk3dwCOgTOL60qRZ

0eLT3Ac8WEgGf6xOeKwkd7oAjI3LU22ncSTtnSwSMwY4ohPdLvMcObkvag0+E+z5OVgBNhdgrFQ3yx0Y

t7D47FWDVopFpag7N7u1MsQPdbOl81TNNuzrNgcu6u
U4LvBJ+mZHeUQWVTNTkm7HoQGY1tP1ECIvmEbRv6ouAVOuHpw/sEw0/ELIZABETH/KYLOmcFmv2Dvbz8ewCMX/

Of5cXOIN3n5WfmNgYO11F2xGAMv8UUhDM1/hmfbLMRLfk4bGp7PzSkfIQ6U3N1y3mGxKJ0qTnSGaYLh2

MieOlMEjviakRhKwoiGQvNu3TqPrQenz3FmqBJjVUZ
/adb+kuR3ZSkczv8OVOklu+VzYqEwKysvlm+9q3N4LKtuYBpraLSscbaXCynlLU6YQkak7PPImNXpEqh

yV/YkXvDX8pYoq5MK535/oDoJqIb1qBg1Zx9aMfb2AvozYO8dEmvaO2A3EyEV9kEdPoxttSk2n2g2lnJ

ZrYdoABSp1ndnc+KFvEcZ+11DTVP2cbbTL/MTg/Jyi
c9UvOAOx5hTNxgMz1c/XYHCxGfUxIcBpauqwZudW1Azkk05JKIdoygVHM7ZjRWEfmMt/5f0d1czWOHom

Z1xonpPprMD6L4F4ex8x6iZW9LL34ZEI3WjvjUX4Hlh2IAiD/Q0mhOi95L5DAqobaNIS/AFGmR1i9lL7

7ue1o3gI2mNHsn6nLG0Kx7GJNOH4RRwewjgoN3zngC
8osj+dis4KL9+MxmiJ51fbeJNRsZlTUfEptW3ClSZDEWpl0hnyAotqI8h4cI+2OEs1iZyG8bSf43Kq4c

r+cY72TlBuQOhlkdcDyIn1MmOGeIoR4Ek5dAhOJazc+oCAkrBRyF7r2yUUIV2SwDvaTeFB3+ob31pABk

CjijP2luOXrIpGrIO5rh7qoTbDQwJoryqVolxHc2OM
7nbuL9/jgfFTaxvrjpO5/S0q+fRuoHE2OC5updOo4cZdNdVAoD/RVj7zteqi7wl+0TBDTsne5LeYREDt

yO0Zn9jayetroMsVXksjR2nlu8oz3UzjbqvS8BEl+D+zdSHj5CLSNGxEoXQIdy4S9O3HciatxkldZ8Tr

dM2gVpjW09KADK2rNHx3ztbZ+S+8PJyN9olPdML8ss
xjhVQp6O5RRxKpXOagFdfXyg6LSQFyfg1X7wVznNLUTF4FBLPepEIG1iVmkgspdjGqU+TxTt4/hVmgcn

stbEhBdcbz6zP2Tan6iBa+oQY2CgcW74HbWqSPwJeXySIrgmQwCz9iacHEkcuS1niU4ys+noL3vO0VbB

CvzLI90xt2F+0iqeuZbq/ERZIj+T1XySf+uO4grv3Q
lLqgF7Evli/VAg5KX29EWxveFIRev+spEGdHT7UTmGPoHOYxiGBDrktMAEMbN77JiG8OLYs4ot4DxySh

xan1ratO29JBxEBlOZRp5TyTt3cW+dGzmCWrG5yH6WqOe4ucNyHRHJwfsjwzHZ0DpQSaKfJ6XM0r9dkL

6QivhqdtffFg0q9pZ38t1/Z+Qb/DiDKMGKav3SE/Fi
4BXbAHINZHBgtQu7toFAgHVkepfZD/fDtXp/Rxv0U+Ih4/uJU3jufHcrJ4OZ1sOS6oydkschbhrI9l09

vz9+gbWtjfZd5pKNOnyduQMMZwReSsPGPEBoHY60/05FmwkX6fuNxhyrlawBL5CpI0N4oMn15YM74FTM

Q9ItjBVQ9YXwePu8dWPsn1gffzx0jgAQcCkuMj2aB9
Ar2bXA5BHnJxsn614MBTXrgeoFwV7qlukkLPsGrMWXAtKDpYal3VBxxFfddPVFJEzPtVMVS+MeTlaPKC

cXtiBlC//ItgTfDrXsMBBTB1SGV3TS7KKWCaXCGCYf61dyhz5M0Ims9MphsTDeMfESHdEgfWTRuC9cDO

u8DWSmSLxBcfMsooTFyPqg4FPCXqLm9yk7YjqKPTLB
efNmYyWbX6+wxeIorJTggv/jU6isdHOMng4G5WcwEd2dXJ8C2R+3CQ7V2hxXsjsAicbvjvfjebfc8H3M

1dq/FhScJCDDKi4Zchm8D212HoxTNHYuU2PsIm3BxiHZCgM/XlqDzVvYxbphLQfWAO9F4s4l6j3Qz7xB

/joN3JSkG/n0/UW2bZNyjvmTRjIPYccHv8cu61kJ6C
FqG4x8ZbfLtgi32yitFWvut1bv55TR4gOyV8V+USMOPfCTgUmsWTRTffU3bft1ivqGm3S33+1Vl2fXdk

h0/vwSN3omq+hm2Xh8K7rtQCddvKK8NzXpE+9RWTyE3NQMZYibzQK2erLMfnn+KJQV6GAb1uVs6ry7P2

8Sz+LpV2FMUvAQ820MwkBJkQYxKKiQUN4ii/hI+3pN
WbujcJRLGhtifbI7BlMWrqadDhDucyJcDMbUqh1tPIlQVatpqSk2mqq6sVQcgAUFgERvA1H0eVvYJ5DP

FN99QdvfPVQ6Lf9K6VpLBjBWJwQa98bnDnZx72FtcYkxKF2yXloSNjoughuPASqwq9wQv0c1kAiANKzt

hf8ieSGf0zrCpieHms49htALAr2O9lRUzTIzcw/026
/LaY3ntRXj69w3G48C9Ltfs1ZSlRa1j31eOFrdxQZ9psQyhglVIJTtbqmXOxGKSdHbpumONCzG+icY4H

60VikMz8Epuen94dCVsUzn/34GZP0uftIZ08hlk1cfYFyzHWfoFRoTER9EgELbzEBgwLVAea/GA64Ufz

2/PL8esepZt3+75s5EWjUwsFdZejOOpN21G1VHYfsS
eGZy/o0x3evtD6DHfXok5CJn2m3a4xbP4zE7eEp74tYkg/bGZKBTjfg2Jg9GX2z7aVdDzFjGcrMeRyRg

ESAUXt3DH2Di/AGHBYWHEkjXiDZZ4Ym4ydvrNdz0wZhrHrMn2zilglQP02bRkhMNX/twl1QyZc7CbwOj

RWR5XVSfrada0ccsOjmUBfapl6V9Ba+l4inhnS67je
w8fM3VJZ1v27cU4Zc7dAMb1CjpyH4FZIFaMPnn73XOr6n5L5su9vBBbKSlawgm56Y7eWUBvWxARTgmVX

dl7oE3MQUvirifDPfSKD+zv7Es4vZHl6ziCNc5Sj0SpzHUz9j9GpA7ILx5wP0pDL/i4oH+v268hhh7EH

lLojb5dKaCuKU04rGrUTFVukxmrpPGdt13RvOHohPO
svst/Bg+kvqbcl3U+Yco3I9fGNZ8pYb7xsMXCgeI7G1U/1aSEFcL3E1oHSuoDfAw++qt5ei+mCsncGGh

q9m9dESxPVg3EBV8O12IPf3IuYXkNvPWNJZFpVOJTglLsk5NUiUHJ5auz60JMumQLRjyIj2FxMc1Bmdt

7+eL9GmMMs+XUCioufZtSIkd2lBMOZLICQUe/BLGji
CU78JH7nrmxBde8o065pInoTh4AC584XNFSLKD8aDfMpex+ePbKwvLgR+3syP7vpu8ARX5uT71MszQTF

opb8ZptJ6o9zt1JyyWHOrEgfDJD8tXG2bH/p0hHX3K4CIvVhT2HABzaGhEWzbeUAWMMJ/bKa8lfYrDtl

cvV1+K2vcxhPEJLVGrO6rjze0XKNGjC9rgJjNYQP4c
lmeE77ne67UCA6AqrvdS2fI2PDNLF+eQUw02fdzlBpgsQnsXrpGvYpRfrLNVMzib7tPqf6xcKvXWSlGA

uWOIItZ8jU2YAnYGZlMuLW3gM/tiN7UjnWEFp4Hioc5yfyHUTJ78wZ2U2TzyVdXO7J4PzWVZgwslAcr/

bvutjmCdkr5ToMDGHdc+csLv6fd1XVzTGQuO5HeB2Q
/454mHXXTXk+3nNZKFT7wowbHhapzjlV1vhTfU38leIYrWPkO7rPQB57ycXBisatllw44qg9HVJomTYu

xm4x9aGD2YkgYRN8cvZk4OVHXV8nlqHXHm/3TX09mEEopW8gfJxKgUA7jrIDSxXHP6ssXKySP6x2TdvX

32oK7a2eDS6XZm0dEkJz9l9ev/qKpfu9fDO8DyZndY
pHi/CyZ6r3bI5yAt1UpkYd/MprYQT8+BaF3SuxrY7aho77Prbv1fFeKwonK1qFP+HlHbG6AdDGwd6RU1

Pt6dj7aR2S+y9Br/5liY1KV35uhi3gl026DZ+7pwLgNJifjAHm9IaNuyhh/Pq7FKFvj+a7q8lWnEn5tA

GYDZaQSNYfUBx5nv1mTfHvpEF+Qj3+nMgyefzz/oyJ
i8B3ZyBCD5m2XCHMZqi1ZcQBYeHerDHNNTDv0ZISF+uPSjwhEQEs3PKVxvkz3t/a1xucUoGC7yIGvL/c

fDy2k1IeAPVDdqvtVXfPlkqv20fOYZRi++Z+UFBzwWDYjqZ2bck1+OT2atvZW5OEuW+CCQJVrHt6h7Gu

JiZw11VDqnagkl7fyM1yitqAqVDnIW69saJpTP1Nxy
+zx9jnyyVMd9R6mOqnkL1V8+KwnbTp4DQfYeWwTLmVqkxNEXD3gmGIVyNu+Wxvpu0o77dyL+G3PE1vGT

aGWPCDCT7OlYiSkxmoFEWSlBlew4110VOv9cA5/IhXvPsrYMg1bOdwT1fHjuJ+txv51VzKEcnlNt1Xhv

c87yBMV3TKTL98LGO65L0F8ofjDK6dunZLOWiIEolY
kOi9fxh5xMRXn2waDV1S8mnhb4eEaJavXyk64VgKTSTVAKkqs182HSV3g9diUjveUMrz2DKX1ErG9fIu

/r9SuIWEmmqzgj+Nn7DV4ceBEUob+LjqBQRpeeq9DBNCrkfNDEWE26y/zTrO3Pbw8p5ahfD4Wbm3n2u8

j2NXETvLLIXUARKwvgC7DDjQw5KfCRUa3fcIAki0QV
3GQ32JDz6Of8M/9P7M//qc5453w1QAUEnwxl3uBrfqspI55SseK+3uEDMcnMr4m+B+8WNbG3CEbRRcxu

9pqde9+ExM3RmBGtDZZqmq/4lrK1LrIUdhbjSIS94Pj61ZBBRIKvv/aFrRQXTNRnitWsOSEZwP0iLwjD

O55KyUZNDQpGcyFFvzdQ2R+crxDcpH9WMMeXBwCZ1e
BiJOImrCFC5+KB0RHjZKayqbXpWMvi8ZVOgm8Hr4hyp1JbZSUIQtoordr9yGrkonDK75jd6gxpOa21WX

sX8t3L87yq/GNmYq2xifyJkc9feEhy/Vn1a+rVdACWdnGNXjxYFOQPDQvteRRqrrSxC5mav6XkD8Vtl1

+mdJ61RSiODmz5v4SdZwGPOA+53pTbQbO54owdNQC1
cBwIbM/oH1sBq+hGSwxHQwOUZ2fssTpWcFjeO37kfwCiyRyQ4AiglAYByOH8rcPSorIlWTXtAwfBg4Tb

AFCB1Yz92cprMzya+Bc1BUb6rStBIXW/1gWtP1kYFBZFnUZobqVdFGnZpWFZ4jt+VgC1Gx0uWhu737dM

MC6WtHq0xT2HkASUdNoYwyPChhgeP50zFPJB8dK8yu
2rrK5dW3AZK1zgDDu+QGkkTEJjQ3zHTkClKlHsSqBwXTuzQbrMZt5OsufGI5dOzufsjwJHFcBtZJ1CtC

aMv5UZIhQLlYWozEkPKbJkMcLnkA3gomw3alo/yXVU/G5F6ngYA0djiI8Xtgb92Tc50P9KXXGpUDsXQq

/rfgMBbPxmvZjXie92vJo3TLHXw25gQjiCZsyEzEsM
0U6ltRBhTStBQbtImdAjnAL/zF/UAzywPy5CRaU36PSimwugmEXQjEg3iESeJVm/EF/FH8cx/sxU/1LK

98tgb9Y/BnCXp4j7PFVBoBu+ahkbLSl/pPBVGHhzyV/3b+/aghKvd2wSrb6ZXrWSOHofGO7JgOQAw0Ih

Z/2d093M6+zTgdfdGR2Cp6PgteNslySf5F+yfnT3lP
YEk44f36gTuHEeLEvx1VU81z9wEAwotwQhd5Z/O9jckU2O+ug6k4M7bKMjBPklPuuxNQZqb4kITpAWjj

DEjSWkePh3V6arGQXhl554KkhD+Hex5e3KRW6De2TTjVxBKlGH0KWWa3eIpMUvflm+qM7+UIns9iOoOz

UY6s0h61Hgn5/Ma1ns0nP9qSpLhWML/3pMXWrSO1Hl
hacTV19fFpADAFj2iwUvkUtg4u9IfoGyiQm9+xDNVGvTl6whTTx5hw5n8hIdWbt9Z67n/jJCDV/pB7mM

HwKUxxrE91ypy9461jbVz1/E3sJaTVDE5cQ58OKVFFKDVmuzY9fn4A08iej9/lZWMZYkyu0wJTT+RfQo

k/+Dv/PXkokbktc2DpoA3wN0QG+uugM8IT3J3ioUHp
GhcH4f/SpMrqTJrFSxB9H4wu2JTiWXoZ+G12boEdCGHOZewxjAfyhbOJ64Ka1CW4PTM5bDWqqqnSv34a

3wPzHwCSg+hLmrC411VaByCciDcoJUPZh/bflEqXrAG5WNbRQZOSSXyomwzED9xEigOMh38UX/ncvOqG

nT8Ucha/0m6ma8xoNkDHddbqeAX+zrLsBQj13FTnEB
R13CDlwDX4ulB/Kl06j5QaSldIMNVVVMj0REZY7DdckcKBB+kVJe+wzLIqZUEHq0RuTwqC/IGVybdYzb

iFnbLaerE54MzeYw0HNZDti41GzlSP0SYthbZ9hW29vyhABdfdg6D4ZQkSJryL87DakTcGNvFlpjLWvt

coLESH7LnZOh5rvJOXHq9HL7UGw9++l38sOpMz5/ta
BX34plpGBDcvHv98PcuJ9kg0v0hsRVVxUyrJUJXRSRFUzBJEQjGQiYn6O87Wzar0wFwhN4NTUbiKeNv5

vLhRgXi0EZ/EyOHYuhX8Qz1y3+mvNRvFhLENSeVJykq+DAwxckrjyjwcfahE0bIkBNIz+8eBvJnC9rCI

AiKSHzk+kq0fPhN/v1ej9wQCGAi42oa3cJYSR6IPT8
l/qGqyC0T5HGa9wfJAgVBgUC18TvjKjEIOCaGZTf5aQZ+GOKJCCnJ02rN7ij1flt9PSNm86mjenP0g9B

eHA5S46m/DaGKzTXM0bHTnwdNmeGmC9Jhiuj+nLDycsGnCZJtyB2v2SWEvIgH6Y4TImSFSI9OGVkxPfk

2sBpUcRt1J8ultf0P1m6WQNvpXzNHWuVqU/Tqg1G5o
FollXdHQjAD1f73bv0ykiLf4oGqQOoRNGqXXS140SxQjLYJHKprkAcEs/LK8Q9bmQuK4fp/LU5MHASw0

0oSHPZ3S3SRqcZdMbNEsIkQJCrdVO7z5exUP8SM5AflYGuuP1ErraTke+8AyYO+54u++Nn3nhS0wvsdL

EgcMFDhMR42PIYi8G7zHk0Rf/Pb4mB7/Fl0VeXs0iz
XRAIUhbPlUsGEM3AgFx3Sen0tKdHm+0oNr6wTVlNRZOAJEthd8+so3//rYghIvLT/KxcjAszR8k1UZbX

RpDy9WIGQkVuhLXhhuUdqWHKWpbWVHBZwn71Vn5g7osbMPSzyccpWCqWO1yjehc9HmXtkF9KpMthFsyV

zbv2Kj5ohaTAcKNhAt4Q72MCY0q3kC+7a5opEYNAxS
LO7HfN/vM3eHK8xsJ3RmXnbwM37SXLRD2hkc22FBaOz6li1ZLFGva99dq26ZOJHPyFPLDRjajeoYGj1u

dKBdcxUzig+pY7RDhgNnOlHgcgV1NtZk0inUcU15FqwaIffkwUoa7AHW+1d3EyUXEGq7y3BBtvDM+soc

JnCV4Yxc1wxWzA/LgNOFhpVUwaq9YO7Nu3T5L7eNgx
ftELPsGbqYkzkc2ehNhOIIOXdJL9tgpXrtwYipQM3tj/831zKuxxe3untuYmw30D4VqbaaKU5kPwYzwC

3XaAoaSgSAbJI6i5mphgRTGh27le4pGdD2BNGCKmhhO7N1DTUu9/dpvbyrPMJ5BfY2vk9T3u0whyE85m

UailKaT6XOyAZ+zW9AvkgtkXPV3he/33e5K94vLizQ
2I/a1FjoZoeZij7BGK5iCveDsKnDu5ZOlgbNIF3X4JixkFuj3+ieW6OA6cuTluv1U/58UN0dixzjBFxj

aN6EfFJmOQSeKCv0+uF0Dy0iGE3bTaAVlhsx7Wrcg8/xt+K2d/FL/yu2Kiq0Bt+UTjQz7rpdIKGWlwU/

6J/JoS0x8MSoxF/M0qtcXhpc1ka6Dgrncgac5k1byC
94rktQdoHLVmw5zbzC19bkINpgmz3tbo/sy+HCbEXHnm0f50SjrgZSXLE0kihrQKW7Mmc3iOZvnXR407

5B7PP/PUh2GRwIfr9M0i/VmKxPuFklANuqpfCWj/2s9HXIqGR+o1vHHPuG5ouiyTouabNzgQZjoBJZg0

aUFHPmJUnyrULW5aOlBK12lCLMYq9olvT9uh+hdy1y
zOKShaqnrDuxVVamNBAIqX0d13RhPiYaw82YJS42mdn9OO8WZ0iBMoqvAeEDaCia7qzk1ViZ9yHSMJre

zFY0/V63uH3nb/lv4voihBZzcBmWTsuxDD/ggSU8rRCxLDCVvCcivkvRPHpYQo0ZRc84jyq0/eekj1u+

Z3CfTJ3Z4FnAmbszMlqp6s2nH3i8pXTlO+8GaDHjKQ
1z5Tp5CAmoGZveyV/PivzLbZtTmC3va/2L6YiqnP46UVbNIsFlMaw0uif5mF9Y22g25DgQaQcM4TkNon

d+a868ItKhHQ1JjPlBGKOrL4cDrzhx7iO1iDUiauKxySSlm2k1KXk4xiAZUrPYxhzuDuNE7gqPNsgTkB

Pvr2/LfLXFbt6uXbTNzjIpoBbcld9NN4Jm/a1WPf+R
E7re53UpiuZMktLZkLvjgwbF645gEpfvyMRm2nz/f11UQiTAdRLzrjk2uut6/rawYqhut7eAD0p1wUvr

UB0ZE3nXhrRCVF8FZNwXGeihD6RJRiBaDvO6dlzmM/7So6NFQBlXXVH/J4tCcRa+O9Yu37fJI6ur7DuL

ESB3/UOOrvksbyIc8ARocIg9Wn59/ymI7NgSdVbUu/
PRDG1Rib1Wma3Pfkw9rzi0R9TfLiCFiyQZIzC4Dq84I/3hJwvROtSt2d04k7/BO7svYDsF+M632afuOK

zGgMsntezbFImB9Zk5PCkguXTwrOnOt4UEwjpaBKh+9D5Y8+KKU4vLwlKEAAB3qzRJWciaQRYv0aTvOA

+2A1GL5bjzLIFM3WP0J83wINDidqvstc+XJLnNtoGC
vNipNg2W42o3cjKA6kdHOeIK9jF8IXPn/mjlARH6frYVs8rNwnX3mGyDkO90mJPq2uBdI5Za8UjuFXH+

dWcULj3fzHt2FLFoVmMSQVw9S7Y60PFEi0y0btZ52ppUclZllQCubyR5KSZKj44FmaHLBkenoWIQ71fQ

aRFJAUdzzQhmGTQk/uSI4JG/qcXBjESKLNMMRIgWh+
3tlvwZSu2jfJ9C+T/PcnVe4WRcxiKgqu+KCt9QSJrjQQ5KVKHoezM/+gIIEhVaZkm6b5i+HUR6kTNAsV

EGaAKiPQLP70I3vwbR3bCbxN0wuKmLZPFqVeQL1tOS0NfMqztcS8bqBr4IJNZpmJUPRmynSo608ydiqM

vhkCvXh5q3+616LDuoJpV++2npkSBvhFzKBewzXEDC
H2deMje10ldjQ43F0/ZPQtO92I+KEgWjHOVYqN4EqhnONQJA9SNJwvzXzQGak+XXrF9llmUhZmstEkbR

BVibyfo+8UlMsWkRtGendYlB+pM0p0y2KqlKAeTX3gxQuvboVDTAffvJUrBBgoSPZND/CgqoSrr8TK3+

I5nh09AjVuE2pWQsdUtFQ2fgLbeMQwVMDGYufw8GFV
97t+4JDAxS+pD+6DehY5i9JCP8aELQNqbf/2sA4EzKKO9oiF/tFmkdPS83LyEN+7ctpri511rilBtDCM

wRCK3gN0CTKsPwJn+RH2NXy5kNTgvPHejSmoiCPZ7NzKGPU9HCtc2g/rUttR+K9WkFpDq9lxJrjovWt1

urTS2yynzEzVGkJI4WSneIywvI2j6sKePXMDDLXUlX
G6QzHvLguGz9ZjTatof03Q7ZZZaxTGGWXJ6qnqqm2XRew5iAUoSjKrkGVFNdw6Z8nsZeyWN38NacGVzJ

9+0AqCZPPF6NQJJEkoWKC1oTHQPgCJOj3pkiZosvM+bB6vNAWQWvYidIYc5lmCrEnwO+27MA0pGe+sme

K6c7U2P/kbsnr7KfND3PYNcnRzn2dc/QdWBIRessa9
LFByJuguPV1m4g7TzbWzp6jXzQT62GSIZw8Y8+yScGZ0AjyJ/24d8jjC/fgasle5F3dUY8JVrxVTlYp6

ylOG6aPx8YC9kiKvKaawAaFBFOGWs7tyHvoCC772J8I46opfjX+0LxHL9Hldm5NVSN/CGf1JFt/A2Z3V

Hn3zGV/6yvVubdC5611FZLdzIeJnvNVXN404V/pNVC
kgrNbNJ0kJJ8x5XULvzhSjS6R7RudX6ovWLsSY41N9t4LZfFi1ieXDpTufvzHxal4IvBgyt2lFZ9wMG4

cLW1C1+WWnVt/NcXMXEGJFq34Ufwp6B4DoLQGkNNsz5+JkloaRcW3XWHMakR02WmxKWUyUxAVnH8x2e6

LzGR2Av4bkzvn8iiP8lzz3Z4Cg1D4k8uZLMPi9+uB9
TbWYrk+OLzysMpr1yEWi4XlDsWn2Zaxjy5H2oPd6hmY6heJv3NCTwQByDCtwp7o/tryFDkBbsoyMp4yP

LfUc873xAYa7uuOImEFaUocMlsoFwlj2udY7Kd76ALBWTBcsxgR51Fx5MbRDIw0HAh9VGgaZXlpS4/11

oKo34+/+3h4rXWH5eJCNB+pCCw0Ts7nXvnshbmNeFQ
HFssyle7rAFjlqO3QdKM9YfoC7CagV+Hx988CjyjiE5899yj8kup40mI4yeEcP69+iXAxtFXsAZIESq3

pSPlTAazrEJh7EjsCBln8vc4O+c1eGmHnDS2J1pW7RabB/jv7F4+bur7yKme3RTM1p/vC6KKUeh2kYQ+

geUnIROFBGiraerMxn5GagoAQ1aVUk8tV3W22eepBi
QAj07FmkpIBzvtgiM0+SnkYUxOF8WuOUodDhloesg5WRmFMBKMWB4WrGwJT8zd/J1vJ1fwMdscQcqMRs

KYWN8HFFGgErbu9KAF9mBy6lHXRPK5SirMyrGv4v+/giaoA77A0J1q2u6hDLjVH67akc4MivY7uUsYvK

cp1nh9lyQgSxsnTQ8E5SkLz9LcJtSHz8vYpay0DoVv
MHRUTrGZ7KiIma1R8pa52oLCnweDHhRULr3/A5ubt+CL7QbmV33IXFarbhhbl3RLhMgJmJlh3peYvxuh

91MDEc1tcbbLbUTOyWQOiQJuv03HQ9h3XPf4ZIgQKHt6rBdSy6ewaDQWhrKZpXv2fKzFVmtT/DBRkuNU

ctVBRCOg354Ragh5GChzjXa1K+9r7ODtCdFBtNzHoH
oKjBj7kcd3LiK7JR5b8lTunJORUaJzscEIYlOcmnjrcMgdPjWsA/lrZGs+YnsYjbj2R1vT7PIDDqFdC9

78eTVXYVtJT5VsRTj5Nh4JZUmQDcdrLeHzfpV2Olnw/A/T8zUvsrcKYomZdcS9kDlBCuyEmQVXW6Jggo

jWIBICn/6dj/mO72KhPJbdStYJhKondtdMs2tYf9ez
fTlf2AEjiUsaPybQPkSVxC5bsOKE64awU4Eo7NDWoTliEwhgth+Urox+h5YRWv8jS4h3Sja9CNbFLDl8

+lrbDt+4sbUsGppHyCenOSZrLDiQgNY+m1ZBsUE1hnj3R8X2wdHAdJwsfGqJ1P1k2zIPkNyibLkVEGiE

X5WQz9spu2PkqviClNRLorXDxHr8ox18MsqpCvaqq7
31fQahju3+/EEc0Lqay8wErzfCr+5iWeW/sbH9m3EmelbBpzkSixlq7Ca4ePNs8TsTpCTtqMru6KlxYb

1UiklD88LfrUaosxqfx/WD1t3AiSjsB/xyHjG66bESUPNsL/5AdwKSph56k37xBXpcwXKcp8dqrg/+Oq

t+ikmGi7O/zh6vrw2Rgph1hnuWEeDNcx9gy8tGzXc+
KDDMYRcCfASLqF3zgQ/J3KhM3/FFOgkhcGi0zv0PNAB2k2ps2r1D1jOauKPdO6o0mrYO7OInXYIcEejq

Evoajmc59mMcqQBfxk92s4RohJzK3+kIZzRD/Jl+a34eadX2paBMIsOIP1ZB5WYTdS8bjmSIvt3SoeX4

0ZxDtpeHiHwXs6iIyC8oh9AJp4KfeEA7ZRdJu5j00N
StZpdodYMnoZ+YiOA+7vqhxLhfgcmh7XMdyHHDG6RL4mCWzaQ2sOLtACBIshVj8cLRoXMwQt9p01Z9Zs

MqvfKV1Kl+nrHsHT/olq2nW7nj/9lq+//yEcHIaqIR7H25keokhXvKrpxEPbcJcZOd2e4+R21UZ+nXAA

hEGnhbxHvFarc3AUYEsi81Lwh8qYIDICNhUnK97+09
8oiy/N+10RtO4YhrTnvF8qqOSALAU+KgVN1myTwhddZAwQGjDVdGcWq5iWD4UjMLayuhkoSAPLcyt5dm

pjJg6qtYXm36pcLG6CH3fHg6c/WZ53krYr3SjEToGqIP6gWsiuTE0FzTv0WdQSglOqYN5gnhCKxI4djB

fZaNNbflltGjkNy7gGSMKA/5Md5bjul3Ikovdo6g1k
oWmTV4STXa/2c7MXcUTLB3sXQQxMs8iSYUwhbHn0bN9XTMRj7eiKdpHIHYI9mddQHpWMQ9MJSZtPbx2m

aSyfUcvw5Ur85cAZOZu3jrx6yPxuT0ilWj4qjG4eo32fpOT2kbsHzlZKmV/4hkTgQwc++ngDaqbkNhlm

rHWxWZBWO9UiCprron//nrpzu+o2BMcAU2PvXQuZe/
QQzelBmkYN3VqqvfnqaqWm9BFm0+7Q/h94fROb2JMGI3ID07z5T+44WlH/XAvibMWXn1prn7501ggYql

f1+X8y4EleHPkTGh5sMWsngQPpJz2vEpxxFuxWL9+Erme+vkIygajNq9Ip1QoGQb6Kmqaz6sBmdeDb8E

e95POo/oHIYLIuBoiaqby21Y0VeH+Pe8tiLpSZg3WX
F9G87YF9hEjnrCVjuOsDTipjewALRLnqkoQwls2aa3POksohmGENZVLc+/IPLJFXZ1YPMOdEGONKQrF8

EvAm8JJ9tvxrPiQRsgBrN7n5uAyEB6WXEC7UjyLpynFGwWnp5Ro5xHunsi/DD2EOcgwu9YXXrTUfQDMG

TWGyGlsxwZxx/Pgws34f3hl4jvUOUIaLjFVwrDLzjU
aatT1ET1BER83yQnmQMefo5GRccjJwkFgyQ56OZ3XoaGVys5nKajS/NndvjV2FTufBF9TvbOmUe/E3o/

tAYp2zSSqwUK34J8reQz+klqvWv6Of3FUXnGC+96h9EqrL9UQsJxCNfkxI7pbVp50XPAjdlSeYd0d3qk

JciLCH7h9JtgR30nvYt35YSncZ8FdwKj4+/B3Zk+eD
LZsj1IenDSdBRRWbuog1gg47UI8lKarJVdDdaDjARdbbt8vXNc4kDer235PVHX5y6FfHT0h+Rt0lABXp

Xji8PnjdAVynMZ5+rT7qcT/F3PPquVQNLWoMwCx5ioukMnsjF2VUlZx94Iv5w1rdLYnjxF6ZQGUzh7DL

XVgU2HZpZV1bmyAfvePyCdsp6VV6vNhkLxUNgZVfAl
pLFQpCUnWG2A/T8G7n/llbSRIZNz/jhwVSEFsr7K17z/XFBVwtbr774h/9f7Lq+VY5kRxqzvEczO9w9W

XFZ1x0XAyVMbQhpgUtYsccno1nz5vw4nl7vTw5KrQ3O0PIhad+Qzy4FFXO6ziWL56n4fHdvyyA2BaMW3

Qam4N+QYe5jOyvOMPcmtGKxj7Jm9QktQnyS21DK6j6
uJv3gslreCNsHxFfyEnd4EkF5dx4pFdytJ1CJhYRKMsVlBQfTuR3j4q4UjY7PLmT7KwJ7p9xskdfOSLs

wVgeyX3pHZp2ntkQB4aFB+IoI0YPAzZi8s8qL3WMlVivF4uOCvZ2VZsWiOVkfUmLjOvR0+u1S9UcbHtJ

hkdG/eZtbsBQn8KzjLLOIvmQXSxlxK5AXCgPMcPXyk
CyBFH65ij5BzfszIi0ueDvdd0kX2Cp379z82WwDg8GV/pNlOwrE1g94CHXRYXEPcufPLI2uKuOzlsLDR

6felWN/e3ngSRLDibgc7hSjH1zBiZsgxbyOrh58Ba+8KmWsr0nYlwLd9Yhzbg/+kg+ItWlk17Jo9rtwX

7la85t0bWAZyqg1HwCixhYdZmNok1BvKirgDZ4ikXg
3sL6Oz7y+CuSpb7potDiaXsspSgz+nDAK123G2KZXIi1RW7XVyljFo7dAmsxUj/djLUjZd1BGgbBVF5G

uJdT9BsA2r+oTM007+JZRC8c8K0q0rgVgAoV8e9uyyxov7ig5pXPriMf2y+Rq/RKmvycOhDsJnOq8+Qp

8uxBh4EYEXHfJmhtZNHeyWnvG+B8Z7WU/womniyRTE
lFC6hGEGm+C2NTk1I8+rY5hUphlTZCoGxFHahYgmVRmdzWObv88zWjM8qLx82TMbNbu0I0iXFUAuj/8K

54EqHEPzYS+cEOe/TobwR4bRyYvSCFl/1kJqR1n0hKEBMFUCwVmoCuiFAoXr8cLGNpwFITbW59qQCGEF

ezFrF4BLtFRNuzLlHA7X8a+x1U+CVCyYIMlSVMXAPJ
X9in7YKwFGmXTAuJkhm5Ils89AyicY5fqQnpr/oIGF2RYjK4/X/KfOas/A6sxH+qHjs/n5yBrXdsSU+Y

kQ/xdt3Fo3a2TkOD3bQhuDA8O5mfIZqkRp0r9GkugJaHgkoxPfFn4V3nBc4BsVjNcrAXuUGXWushheQp

ivcgKx0zKD7sspNhU2M3pwrfP88Dcal1k32l+qg7d8
PMU3nPN3eFsspQG/gh9w+ephUunh+v9O7fYE0AEM7KQ0cym/C1fYjQy6kBvUDk0w/oFYNqhaQTKr6sPl

KtE4DmdzyipxuD0p/0FhOOZNZ/8gUPvEvP/fwFf/Hhwq+zvp55we/LYMjHgu4xtKxdwwz5mC7n2Iw/8g

/0H+g/wH+Q/yH+Q/pH4rIY8XDDoghCiEAgCpEW/HMs
48Ue2HwqliJVpIPHSBI8YkF4MF4sn1sp5ho0xtzagL7upkgJWMxw1Agux3X+DBQeYowbppoJJF/P72hf

fw2WdccpmcbdHyeV86bKmIe2YWEYNVV0Z2EzuaIlBhJCFnW+0FbxdtQWYWiEGKJC2iF94l1t6VznKJ6Q

Xe7M2KSqK3Rufnzwo+kOoaRBwz1e6BT0uLOuS/RSze
QVNVhpkqaD36Lzy9J4zQBj1xbGwwzVWd+Zq++kW+0p1iuigg3pNbodVGmCoGOg3qx3exU6NOgiMdS1jR

0r32DsIJsgMLb9FcOwEKCMvqBLQmv9QTp3pFYIsjWtBnxs9ORbVwIvhcjT5g4AxKkyfEvjSoljC/qhTL

cR+sQpDpGIIy2Xoh+5AT0RbjxIFh9SgAtmes8ueEPI
oNr15c1JNuTfOaNEhaEVgXxit65EKT8tSC7GwvhdATr0yIPZiNu8Cn32hYFgLsgHqlM9GdzpOZjfWJHQ

G2BRAn4we/zftJeyhPefpRlmRUdLOG9nvCOnoluw+ixpjhSC0fMHJvtN7+46RN55efXw/4jH7V9osi33

G/ByKBDd2Zkmqa9+gq9TBIPxd6kLN67U9keOPKPdS8
cYqthGIf16Fg2RjJrGmCH/ecd21TKFyBfS1UM98hFdite/h+Ii2TEtreHRIzRSfhO/Bck1SjfGr2uKur

w/GF80r3s1jgn+8vjFAyWkgURMtfDPpLg7UTEwRENV/BygtLuELJgsYSWj5P9/reeFkcPN6SJeYs10/Y

ZrjV2zc9E3rusqUy/gPhO7fUmcwAmAU9eXaaqeLHwu
xeOgzj8/3lVr8NJvX/QkWpUbGwKxXY+VABhjtQ5khctQ/PP4bQ/jdnY39l8BSZvIx3rIGgiJnXUIdyjx

+huIuZIft3Cm8WORfudOLvj1E7ECN+MyKc4vfnmN7zv8xTp8ByAXrzLU2hG4r9Ntk8N7Y31R6KLl+Gjy

47QoY7cGld4m/tOxMj6Nv4n1ODNMOR5A10Rl0gm0HC
IZBnvooq9IsIAucKqYd6JHSNty/Nzd+Hx8b243ureCw1HUKpnOxVNl8QW3VzGLHTyiJDQMfDrj70jlRZ

DHR9YvsmPo/4omDA8jRkNlwE4jh2/j/ogZgEnHYCNlvZMmILHj2WFikNJLLSYDwIrPHYSKcXUUiYUzzA

dFJnePOSSWYMuLLGzzhm9mfnbTeja+4y3y2vk9y3n+
3W9+WBn6tptok90OsOecnrorhDncHONOgGmBxWsC7W9ec3D1Q7a/vCiECGtQjApXLEkT/LsunydhpFLk

9MPgaWOZST/RTzFpp9huyce0Y44PJMw+NDUtwdADh+mwy1eebHXzuNHrJJWhjDQdKt525WEDkfdGOaoP

dTLal6MjTQEzXsD6VeZNqr6vP+KGHNoMT5YWfIzVJ1
eHZqSFc0oyE8wEyc598Mf7fumfPxKrWURjFVd8ElAf5gxWsAAq07rQ3JZ090kYhGF+14if/359f+i/7f

didaNSaAFaLG93i1WX+0CRj7Q+hTR/S98nA4qUkcsVbVpyc6GadUg65xowC7x8c5t9csoI+FSN9MsgM+

y8Q3PlE3hOQZ+9POWi+WcOUf5HZ4Jifp1EVynljXFX
2d3KiiptNQ8hZySYbx5rWW0WCSgNDs3d2O9J6Ifk/LZrp8toZr93mPVQyoaAm6Or5N0qIZlm3kdmTIj+

jN4R5B47V2gHRF1UE3PsoU1WN4gxXFXjnUMK0MshV8z1UzzJT7HycRcNjCCUAo5aWyqC3nxDjbxfHoz/

mtM1h3z6Wb5DYJAM11jkn7OLQno9DNxXaI8mVq9zl7
QXAmH8OHjYthNLq1hc7BfWhyq2kP3levIBWu+OivYlrlFp3e+sEdYDPK/0cY5x8ulDrvExctAzigrPXZ

bZXmzbU2SInICKnQJ++QLvlhYDPmBeBUfMpHC+XQREyHULcfjRcsARi1ICFt3t4dTGhXLbWUGagq4j+T

lgXds8AJIxomkhvf8W7EJxjyNNjUXrieZ93F2Xa+P7
FZh1mpo8qyLN3rWeHr7muA7Kim53kC2tWLwlczcKQ/gYk1RQ26B+6VvzqI/j/LABmPHi+JGD/Oa/n6SL

5FXfQkCXK7X2G22ERDwfMnaHKBg0NiUQm4tGPYLl6PbUBNShuhZI0iEoDFSGIM0OcW4/SA9l3LGnSTF1

j/01DFV/1P1R4J7Q9ABfeRQgJVb2+Q2wMRJk1PQNmf
cBbbI0T9dxrsMrwPmoa0ATFqq3U28dyFOm7/Ex5EwowGxBT1lC3kq/P/LThjjoyW8Ewxl/n8Ew5TysBK

+SXLSB2Iv8Yjn1tvPTI21+9TZ2jXLqcPTh7MtZfHkj4rp0MHTUeYsFVN6bVwdZvcrxQhdHtCVWJzGMgz

//NKRCLf5ywtNT7EiknfCvHr5dhCkdigaNaD5Jqlpd
3kTNiVsEcblX8768+0WQKg57n0s7K6EtXzf25yUYt0bHo7LABPwOrGRg2CPZs6/F/zQgpt7xUEIt+3lX

8mKeqNjn9qkjHgtYOKgCEZVp+pV49PrUxsuTMFOmuBswTlVgOJYhwOrZ6sJYOSZKGhO00MXMp8goKTS6

H1/yNwc+KRTBFfLt2xE1WIEw0FPi5KyCgJIoeVFF5M
1C430Ex8VFq1walyvsqg+dSzldOUMOM//ZB5hgH/DGXQMVsKC1/NrbruVFkkPkL29D2xaoq7KfxbPv+D

DvG+RsD+y9hagn2r/c2Uzkf97/x/JniBs0R+F+x/wf4X7H/B/v8U9n/pKPt/rke2AjTU6/iSXrAY0ZbC

9HI1rCm1C9jmZozlRiSwlnA8n/YWrXKoN5vE+MbSTN
Tg5tzsv874Ku4JyPYxDulKrpH9viCusLr1AMpFikNbkazuH4+8wMtCP2zfBZ8FO8DgXrfSQKACE6x1RP

vzisONKnG9hORnc9kyS0VV2eX/xi7krNtYpgBuIvu76radfs6sNbvF4IYowkIfo1QA1nrE7NWdXMfZnT

VOs1KQJWpK8++B850IH6UWWbuBgmtad2nutysBwwxt
vwtt87llbhnd0nk7BNVJEgXN0lI1IwTxkI0ysGPa5sw3r7KcdceqQNOQuJwRfXAcSn5YBkSq3KHKbADb

8lxri8ejidy13QffG2vWwru4xTq3kpLt4OYSiW6Ko2B6FqEVXn/wt638QHB0oEWADj9VrX/eQdkgce0l

j2JlkH97Zk5QZL6YsN8/G/Vt1EclL5obXi8yv5mIh3
ALbXLrZ9xeZ7jUNwEuB1JBxY9E1Ig929IIDtlaFhowsWu60N9HMa35N5LmZrnx3AfRMfubgL5VzpQs/R

1wPO4vp0HeSeG14+rOUg8RspxAY2Rv6HdwOv/eLR66gjWLFYZFkR6aYNW0ZcVMLn0o+UMfoSJjaMjBld

xXzPZQAVuz12nGybAyhPHEY5ClXc4XKRfW60OzKTCh
h50ImaIt3yT5piVZVAWsvk9f+bv4W8ZGuATeA+nz232Gc3XrWAUv0zU88MG0wMNt7dMG087hRAS7OPcV

Eia02JcIVOyxut+XHATmKxHaWaIZXpbnsnKvJlQPVAv53wtAmjUNUjNe0yfMa/8R6nmc5gG/Jnvxb7/q

wvczmQ/BpTux29eN1gJ+2m/z+wXtC44GY54Wx3H40f
sC7HrF7zAjujGEX5/2s+0MmTtaPIRSg+z17ra7Rcvtu+17HPVDyc2exFoZKD8F3Rqz7lQAGpjRpKWRz6

aoHECXcun0e3DmS+D+HNm1lGO9eW6P7lQnkAd3uWuQVUNwSqlFz85jEsIjjB+Bb2C//ggOq8ztv9Exyz

SPOBK+UlKDhO2NaBeEQ1/8/q44hnzmKsxr04V54rH3
XcjZkKG6ZtgljFNGzLFSmKaqJdoLgBFBSPMFmDcQjregmFBLFczgcnyXOF/sQBI1Izrz854nTZtKlO3j

vmWWMsngjfy9r+LprifiYAwIskJhHIBYEd7C7dN8+TEl3QOko3G/RiVHjR6cKW10L+My9h0XsQuMUF5Y

O8+Lls6AqCIzZgRWbVZjLlyR+1C/qR9I2hkh+bzhic
JA+9V2EPZAoWd79DlRqs39DNyyYYFwYWUxU0kMxuLb2VsJaZ3SmK5IqhdE/EktSPX81vfTU5KkOcmiLw

RA8HAbsekKVlQumF497SFBuZU56VtRmWd+sHJRnIPYQ3DraTYck5RfW6JgRV5W+O0tt7b8Cr0b2Kk6dS

3Q+JvruZb3dlzjKEpAwSslUsWvhXquNdUvJhKNDnfV
QtB0Z7FuEVl5E9Jn5EAeLyOHFSd9IG9qz7A3g7Powh/ybT7U0R0tyDXcjyLihfFUfDJzlWVyNe7jQgJO

PEUo7mhcmE7sotvhFlU15e5XsgRHn7MuSP1RB7Uit55QrpjccPZLbcjQVhe8ZtJXZXUqgMnzvprrxN7r

9c9cQPskzoa6C+l+V87zXcoNwkt0qLNJHgcmM0eAXl
e9CZbcyDzdN/z1GvdeZV24CLYB5C27RBnihSenlJh2idmB7xDGc/bVbFgn9bK1Fy9XQJ3zK/mTIc+qVo

HyD75i/pMHziKzVz5hkiulomB39kNid+ZtIURIq/TEUiibWINgg3hsMB8ZqFsiE0izKlDQAhfCuS8AKy

xNvCWR0gow8+Ityqcxq7W5A5uKKhnyygIYjCUHfIIN
tP8CaMq0r6DE1OgZmh6O8QEcXevvf1nj+W+Aq21U0xBFkiqxuJEFMA6ayV0Kx7Mejm+qharpp4wNtY6K

z9lh1XwCHq4VrqPX/uh9BDRAT9LBJoRdFow+16IA44moDWJTIedX1c0XOrishvDKMFmY3QwS7DYNHTvt

4H0SWORFMsA0aIsj8SkJnJ+znn4Mew8vFdmVDDB0v/
bfN5I9xeWwmfHtYboWjN/9whTQYFEffK3M7J3YiRNrzNi7oqqsjLGR5ax8i/U9i1+MVHnPtKiIJC0vV7

f5E+KSz/WkCB1v1482QfEGbe++ufemYP/W5H8gEvxft/vBUEYC71YlBvx28RDE3EKvG30LLcRqDm/7nr

5J7XNonE14OrEvpbmglTfRR4DmBNyYbQWiGWfD5mY6
qjjcR/RaFKFl7LXuNKjUBOo8yyf/QDJIK1qBuwdR42u6vnstjSlFYVAlW6wBD1P7ttCPIHQ/R7e0PahZ

isMZg1ZsAJUINKqL/jH07toUjLg/fJWSmsTL9IFNpBLg5XvPi0m+yXayQZp/nN+sDDln/GGs+y3pGdCQ

HgDesnj+rw0CYJAW1GPuxujl2PAw6KqpxpN6xKMss0
BAYlWeinlYM52geyVMlv9iShZ2QZY5tShK+5OY4y2O5bfXNXvfiV7HJ0ous5M+xxUz8bPsXMc/pENewp

xWNghOWNQp1YNvHr62OQ7dRjP2EjRB3pNdqXUOQjEOtiuWBm6agb0xAeNtU9iD+3Ywhnq9HnVVucRy6A

tObGr2waBQ4LgftD3+054m3MW7BbqJXzQrYeHG6MBp
w95BAi46wFVtz/vlhu8PhVcB10Mk3mELMtDC8QVk8LSqdEk5OrTp6itJtEIoxINpv7EZzOs68IKcmZXK

9fblX+SZbdBpflIc95YSlPSFFQoy3eDKZwx/hOi7So3VMeauZXTIkkCaneDEvU4OS9imNM+YvQAaMVPG

bEAU2zFTLt/Q6lD0DCYLDV2avFiZKUz189UTVltkww
HT62m5okIGBgYguF1x2loVtozbK6Qmra346DIlnIQ7ZhizBKgP9t5KwkOakhdL/qxOZ+/qCUuPcL8q43

MAE0c14s+W2ShM34jhQbCKyOuXOHs/XnpphZKOmXnuHTYQ5xJ+Uu9RgQ1y5qbnCjg26juHN8iuAr0uQw

xhZWm/fK2CGVD/TBAIq6PdopEMFsywTR6DoVGKGjiK
Yar0+VPimIw6jNB0V3Z5J0xreRtjLQyeUmKDr7GvJrH2nE0TN/k+NR7DaQh3zrwrABgjiOCmf238YaBV

E4Oi/gB1TyaRrHy5jblXOQpwAgPVMxZbNoMy92IDMT5bOFbcGPfc+s1kmge7u/zlBF/ffE9I2GENDLHW

Nj6XlCZEwFgw2kpwbgydmOtj+ZyyZH/R6T2WGr3ndn
+jose guadalupe+SobNty4t2mI9F323PEH0WvBSzJlx0cwGGYT8ePeFomJ/6XP8manncNWp+qPJGShCsJgq/6B6tuvC

PHRU0HwbECP64bScRAlM1tDFg3WWnUGjbC3171k5Wcz9FLsb7K9VK7ssAu/xVxQiDcgURSD0Qpevo5zg

oGiStA8bxHIQSQE75kvQdPwjw+qj75CdbqJDFaKcX8
zWY5hVx1ureXBim6oaAL2zi3p6/M3oN4BVZNuOxYGcGgNAKA+tlqhwRzM9gQ/eb0sKB3Xf+WRJGubHRg

Heo7KMQlkZj+R+Ip8i0i+foZ6NbCZLA/9jtenlLw6tngD7ARcxmm7ztjwFGhzHDgklNcQ/+5q93I53ug

/5E8Dv+2rUL+8QCJRkiXMtrQDTeWrWUW7eLGN2IdBf
RaBbqyzljwGEMIe4DeWDWLb1TH04dBweacb5sa46cfZ+KLuYJq3oAptU0QzAdJBy2kQjGuNzumeQuw8e

f8LtzrUt5nY1I9Gv4pXGPL6GPXJohU2BApw1cPbSz4j3SKSt8j9/jTY/a0eNfOF00U75X821xJJAWvaI

sWnI7uyCKpXvZ/un3Ob+VxHHI9u0ZD2Mbzsff16Pkf
RaD2/bz2SwlWS1FHoPqjwirtvx8Clq8cyLy4pRZUyDk7KAC+qg0kS+lctRJR0L49MjDNWniyfgWxeHEe

SpgQU7A5wIUckv0WxX+oZ16biYOaJLG4kPOuFo6JkMeEp5zqTqO2k5ciwCFiCp4valuJvTBdWG1PYb4d

VoyfwTa6hbA/fUi+HLrz16dX6biNlvorMSmc1rhjff
W6auq2LwljfUP1zaQ3obQou72hJ2WFGGxCbDWWu8kZm5DLUPtA575YZ1iwcA8CJxuuH3N0pPXwe7cdu9

FgBT8oNTH2L4uveW6B6dBr0HRY+Lmcl7fxfwjsQb+vTiTc33dIOYV9vg2/+dugwuKEy5lhxlqqpgczhf

4TOBfUqFtv/568m8CLmlhLOQDKCMCL71N9M8IMkGHX
BHErWlWOv4wTR1HHTrix5t3Nbroujc9a3XCkG7ipvkRgjJZZJcWT3WCCO2THLsJNaARbwTlLH5AqNLQ4

LwvJNxlt5et1zbEyPH1UlMn1xe+PPv+TcvkYbOci8nYI4KVYBAckVNWAEtpoJv3MdlImcVI12R2Uvh1z

8aiUI+o/JkO1DtFlE1UBoAomgbQQyK5Y/yHZokMw7H
mw/l+h5nwrJxEIxCsFpMMUZAm2fLPoFlJvkdQ1BYj7YiYjByZABvbdo9wSTZqRIhqcBVjKy8Sz0YQv68

j3MJrTYPU9uWpnYWpicvpGaw67W5Tl0Sf8w8NwteJUmFeYXeOEGfDR08y0w6PqmjuWut2J+L853zRvYQ

3foaP6iRrUw7mg8Thhd2lCmHK+SGTMOhAgpMId0m93
sVrh4WxhKdv3t4JaDXfvb02SAZiX630wBH649xtNAYef8P3GN3ayarM9nKKEJEExc/gauxx5kuiUcsaz

9xXYATNLo7fblRlk7ji8X1N6N2reeLpRTsj62LNY9grlpQ/IJ7hLaGTM7sf0DPyqOMcWIodXIg+hNZ2s

STZ3HRbao3Ex4oYLm58onIKP+h5wEhP5F4PWWcQO3C
cAf6rdzoY1mDfgmDUUDSx4xpXwlfM9IiU/5grlCeTNdI4QM6GbVmUd9RBtRFxwxA4QNJljfWQC3q7+cT

8mhBS4xUov1KQDmpLTMGiPGz+Kia6Fwi0CJaCAG+RxsAVX+2ntoOWGCxy3yRcX0zkBrTBE/bCbRjKe0l

XjuRndjezXYlktnqTQ6GTsmIhyKpJ5nVWdjjTqD44t
lXb8pOT75EAm3Xc/OMJ5tnOSbgaLtNqZllSezu1PuSqRytYnIE761BfKy6Kw6xMBcFJbqhpHZ4+saZdz

nxtI5kn/PV/ZH3oBDNO3Ooy+NPdTL9kAJ5fAem79pTm3YlICwmRLZd9Hcpn4ZWE69HpCmkSg7De6LMCK

Dvpsyb8XxpbM7BpwLRpDdw2iovLgWXF9NsUW4VD3C3
6T9q+1wT+RZ4a4mu917+gUk4gZ54RSDTYP9Ka48GoQ24RaYQqj8VOhAw3kg2/HBfSxJgVaoeXpcAntkG

MsBwtiNNvKMGcRo3lNuW9nov485Mv3QK6reRB6TvkilQo1WRIfV7YIwnGsxtjexZ1LVy11bxZwwq5w1l

4AIlQGDtbdt4528AD/U4uzcxsUiuR3F2agbw/kz/hZ
9nnEsxxLEjfYSxYbclaN/hTdLFF+c9Le6QECpgS9TNSDJy1bUzSTeWs/+xepa59e/FgQY/8brPWictZz

bLP0tOu45YRMNpL/Mn/D9w4+3pQsv5dMb5a+bfLJ/MDa5gspGU1WXY+joNHebJ48Xatacl3WLp8m7XBP

aCX2BymI2CmWKX12E8Shk9J+dT+LNd6kJQEeoFwYUp
cQK0MuxSuGsu0F8VDq6ya3uuf/ZIVwOpAonItoePAMxXpz51CYpk8XIDWz8WZ6v2xVn/NIox/GizcsIv

N40gY4nuQZg22NsNofbPdEgKNxLb84rRIj9du6tyAEDsVM7dpXn8C2bq6gadTq22H+UbIRf6QWxiPWIc

cEtdEME0E6Mn84M8jmpoY6Ple4BarlQofoJFKNFGM9
McouW90hs55n03TqebP1B3ScaUe8wxbsh7kktpN7aMcb6P28sOrQ1FRYeYMnoMp0+nlgnXppFlmanQ7l

JLJA8v8e10j6e70zRPw4u3ibJahIgzn7umvkfu6wPKsEDIZolTcBj7Wm2/U7RaLuAUKLRWj3Q//73x7c

RGcqy5pFLSxUOomJj6HExpmbgsVirugPDc13Ts6vT7
7i4x4wEPWK8WPDLDGUNfFq7paAEyel+tAjTz+CFMSHP3nJxDb9pn3Pgci02gjERufoCcYgLNeUZCh4BK

SBAEbEHHeKQPITVMxJuoIk0W8BSsFD+cZ0sB8yFDq8d+X7159E2N4/ielq9EqhSv5TjKVca5bZx7XIIT

Gy/0qw4+d9PS+AvtTx2OptKCzH3+OHqDQjH2Ig0soH
7TpNsOMZrJFXFRCq/MmabRGNc8B55LDhZXld0nXSDoKKdl3o6Tn5XQI3FkxN/vdjt54coDiTCD023wb2

tVZCRa4UUfJIRGmDsffnZt7sFbpsLh7sl7ao+nxjNxMMAFbHj8htgJI+g2OtLf++ZaZLZA/hyJJt/ZFF

8ubjh9sHqo0c6IpVwGfXM0na4m6qNVECBweK+t7LLX
OxNdfy/ydXaXYfsjeskPZBlJ0PY6x4tPaEGQGNlK8kEsywYdKyzBrphchMkR5r0vbm6WLOSgLIy6NhFe

8NftJtw85ZN4vLwAAxFzOKkv2NSV5S+tK/RuLqutmsGj61k0lRxvnQo+nx7bRu8TRvvn2eHvaaQCrs3Y

GlccKmAfLqyRnBFhapkRlUUBR3Rdk6IrdEI/P9H9rc
3B9I6DQs9L7C6TTzHmQ1sKoKuCgmtuGkK2UvYA8mpd/JXGqzbhNdziUoDfTFMPmu9Zj+qUeHEziS6BqM

mZtYH+FmIrMM78e0mEiP7PZvXdxLQ6Gr9UTNlBpzZrYt9ofAWNe44zSs8PzIeLvfbnkf96GIVA6mQFTn

IiodM/QyMTg9VaVq34MZkfacFNa85B4XzPqWELGioQ
oLAoy/ee4b+2RGJv3Gctrwxo4tmiEJ/ENOZ4Jm3N2d2ksseKK+HpIQj9gxX+qvmiC86Jv30KRJ7ti2JD

4OYhANFshqPU05KAnDMex5db4pFkMG225w5Nrmg+eOteDVNzg5aJIVHDz/Dy62/g8XacdWkzs4TtY2fq

AtOjM8lCtsrYLyGmvfjVoqV2KcEfu2zb0Uz+DIdV5e
fBZd78CiCzVYaW1x5DCjfBQHGsb9nLrldPcAaM3xiyDqLgaE6vpteqYaxWM/8wjYSm9r/XoZkuhp+Xhj

3kYWECvuM4QtTISMYyHZFY99iSiEu7/c73GYG+zN+AozQGXQcXhrzBqirOBpjuNax4KAjdzXhJ8LHRGG

xLOuPF7CH8FC5Y6/sm8ZLO0QmUF1RchVu1i3cbg8QC
cURJhlSC6os+XCflbxXhmkT2aSVwrnvPzRtSOgnwqjKEwZdP3ftNhEQNMPsTt+aJBcZeBpedHQswQdOK

4xafAUyBiKxsP08dl9Q/YZifcSmTRU+6ICM/PvCDKZEBNieqw0fPGYbqLdL7h2jCawkmTY1o6W/INjde

REpuKRl3B6fho/BluevUH+yBIgHRrTNCRoO6mz5V6p
5XjynFgcZdKHMIIquUy+MOckaqT2rEkRF6cz0JEVIhstb+d+82yM5kxHwepft1PP8zKXI8W3HeeoasT8

wZJU6WDfTgL10Jkn7NkWtqwXFjYG1/PdQbVwmLZwd6PDOCsSWfjH1SytmmSDw6tKPRAb+mg5QRRbLTZM

O6rjy+EHjkGI1NvVIdcJtE40E55y62Hxb8mMHjvNZU
QcqpGrpXT5kbCGj9ApJRb5rCLYgN9eDFHk0YK4miUneXseSa7ColPnel/zq3+1ZzC55QDusk6mlYwQ+t

ncFM/PMdXWz42haJFSI7aCfVhy2htrutDdd+Md07IHMvqYiBpqVyOfBwjUTxbBfq74LdJgn2zNaMmy9a

iKZFlqaP39JhCpmfQSP4yD+TchDcihwWjggWv2YX3M
VbB8gvYcV473Zh3shx1lfaM9xihlXaMZs1Pss+j07qSK3+q+t2HZSyuXKI10C9JziPv82hE+HhdAz+k0

DPQrvWL/xqUASFySJAC3r1o3ItQtTDjalelEGZLIYV2oZN2Sq1u2iKonrSIOdrLz9iqbO8h+PPkCyfjI

u+Hxcv36L1TZ182cxMxxVyX3UwrVtFV31l/jDjordR
2ZiHo6rpeC1WcO8kD3ihBe/LyJaLgQPGbtf4IvrVB5ANWiLm+x3/8KEvSTVjMNYMTIzR73ne7Kesq09Y

tSCUO+Nm7P2sgszE9IumA6aMsYiIYHrQ58a7a4MsL878/otOoAv5CJWgOX+hHr+pC7R9jHvshRPXmG6P

u//o4Bw0CprcCKFW9K2QEwddaIB6Ncl85uYefk0Mm7
vvBr/DGjpIMZHx0iRf4EjdJC5g02ppMe72K5ujS0efSLkalciaUQ7FQFktkMPqZbmnfn0Dy1iQUi9E85

ErUJ+XJ4dgjzFdYft+KaeYw/w6P4jYCA7YncoWTevSiV+b4Drx3VHlpqvGn+jLKm3wGBYysBoF0qjgnU

M1T4l6VU1TKEjbwUYV8WEGPYXY16rjTXRAq6tUOsXH
56B+JXGvN3VKW9NaPDKZ5GYEatmGsOcqaPBbR/bZuttAg11b4Mt34GEpK/dIh3mRAXa0WnVZoaGFZkik

3fXcn0BR25Je25lI5eaZpRkFEBDsYrhbZ3k9vhNfRmKWaSJ+9uaevd6lDs4TMoYi7fw207BqS/qd/9vp

f5cc0muTnUX0S4RBI2J4MxkznZK+puV/zVue/jwnDs
11eUHMKiXwttMKT41N06rwAXW1IDv1QvKwCdldObsyYe6ri68eWNVvhgcCfEGujMbMVBc1ACwQFeEOxp

i1MCI791c2JnmhWJvxXk3vwe+5lwvYe6Ub5b3SzQ4fNQH72j8CVDlpPovhgdSH8UFB9p8euz9W+5xakw

oAbwhXSHffXtMBmmTtJAmmbqciy5SOzZfy6NsgOwpr
spMetR0j61OFeBYi4O98yDCKnBMo/vLCPbwFtF8fox1n6V6nDg4eQcwfB2w3STzi4eHR0j76UXyib2mV

bmLTVVXcUHEzOM99lAbjBagKoYYlM5MRyip0X9YUAPrkumQ8NXSJahXutmkAG1nlGxxbcQjeket2kgbx

Wf1GiAm9uVrqtKqzwz78hkhLtNhF2NH96hDgIXBdR0
/AsNEaMIDlu6egwMvAinNHFd2LC4rvbTg2EFrnlBaFJqabJzaL/qkg6s4ckeqkZtYrwCDJJFSURcvmu8

UInV0WGScHtJ6VkZvqMcILoE2ytGHUbD0aWJdS8+eMawxkblI/HfhFZF1VbDzhFiDiRPtWBn2nyE0Ss9

nV84JjaE6WGGzL6wCbQhlYUM545HZGBVOkmAh47A/V
CgC1dcSvWec7ftSC0Fgw/xiqfAfjRpccZwmrYhDJGFg3uvf0Q4ElMUiVlqRf2sO+zChEsd0RREAqSQb7

WI/5vKcQRdpqDAuhfQwgLRSecMP8vCnvjn5DGjYfrDYba3l/ypf4W7W7g7ejAAg1F1iH/PqigZIQsPoh

5VRlSBlEs5EKpPfRF7G+1l9+s5L2bW+wnnSehWo8Uf
7jVqCYqbLgi01dqz6LCus3N48Km40xdlUnWkIIiDgXUVo6MlL8nlSpjGh1yLZgFh1cCw3/s7Xx6ZiH4B

IXMG+nCXiBGC8yZs2+KxSWFBtt+bmP4ZZ1lec13JaJlrKK4weAwkPm/0nk0AEG1vrYlPNB1jYg5ZOdoj

GxVrdcRr3rSDlCoa5VgTtr/MADjza6o61kS7rZIw67
klQTsdcujPaLjwD4vVYBkPu6CZM1AEqYyWK0UhTb9aWdi6XkqGD9D3PeJf55EsJ9CjEjV5kK22CqTlQj

jS5lzSTznXE8EutmJtyJN/jEG7pWOgnVuaL716Em/uABSudlaaU/iHQZw3KdcnpgMwXbYl9DU252aOO8

Knfm9xSh7UzrWHLWTxNVdcZP+6SuMLwtXRLydD/wRD
HGRtXpeQBS9vytc782K/aDPzufK/Fuz/RwsXP+VNppnjxEvq4vpjrLhVoxrh/fw7teS8w8OVwW6F7MH3

d695jldJafLpXwkKwnQ0wSA3oMdxrLifpQbiqe4x1beJco2SJVCGlrpWNj5EzMvao7yL/q66qc9G1xXd

K50wMU3KyhfH9wSRiCTMV6JNoGvXH3omen7Bwa4+/t
o0OnWTxmOs1009fRNRGNjQ+Mqh0Q8vcQFBOctIOCHAYsT2pfKT5744fzbG6CSh2reAJ/FfEBjypHZ5W/

lm79RlTC0masOSiBr4OnvIA2Lkn4onAldEuq7EyODcFAyYA64mxEq74dA56+mGNM0OB07NHnyTE/mS+b

EsBetbo0UR1Od8qo/J9PWeX2Etmot49IIvR+Ejj+Lz
z6eLkVwHrEMR5jiAM2bKX2jY/5yWnS7BKI2earNT+MAfj2X+JR6a31MBXVzuT62RztCEZ6VTNhNNL60G

6UlYxKrvy/AZ6u0Lxm7Iw6dg0WGIEGJE+F18k6eM6sRry3SHORiI36H780RrgRF8aOr8trG0npdlg8WQ

J5HAdH4xdLnG41R1ZLllriiCUHYi5mAVa2Hh/6nS79
hi0dqJAowdlfgAwCLljUBy8J010A6yT+VOtScCRy+XSLmd/VnddkCIMuVBFj6rs3NyqEmv5NweC8rLht

+0YGDk8Ok/lCcwBnEgpg/We5S4nI99EAHm7QvvDMY/23f8XkHWKNCtVULrg2yCLi5NkElXNGjgwk0uU5

H0JNQVTqC1IvO4HcpSAgAAP59H1yfm5Er5SQ57X9/A
Abc1cMQXuNIDCUs5kCT3ac1huhbW8X1jBB6nnAYtQiC8d7KhPfJEIC03WL2KTUlp9zYc8BZR5T1FuszB

SxMHTOjHSndx6w+z6qfQyrXp4T10DkzwsawSGgIecQ3bC7VyxJVKQN+J/LO2GbtUi+PKjKx4q6nTL2PI

PHznXlakM7Cr+v8g6mFD6quOk31FbTOTWV+ESPMvxy
FUDSSxoIWFBsxEXxc907Y2bQaGxnCJXIDw1FwcLVs+hrcFebGx6qKH0wdXM+cA4fAHPqg7WCqJVpTWts

e8gp1NAqw7F1znPMnFmOr/iaDk8ZNCSR/RC7COZ75e1yBGNl9k2qPk7mIex9ZQSPomiB9RmzVVnh505Q

bL07GyLQVmyFBWqgfw+vNfC/wvlzxm+9GWND40lZYE
7bZA3HdbmdCn+dn8NKx0jcYoiVsLqU/cgI1qNSb3ljOkbGwiu/C7uPNgZhXgOuVDfK3INLtZ/5KVlae4

hT0xr+lutu+mwAiEyfzM8hUbcVM1hEp5LdGnYwDqF68taotYD3J9A8pYxUW4FDGkOijnr4ZdBKSgSV+F

fU3wj6249D+Xre8mHjwZaxCdi6fPAPLRrn6m3+OCbm
x4G+RMLJckcdMu4Zh4/l7HRnOlLhn3mOivy/5fiWPuJc9jx8CY493VGDYeMHioQdLtBbTdDNvwuQ5yRY

PbRN1qlwMyweWPLquddkcpRgu6M8gCNbii4o2mjrcOJq+wzeP8HFofYAbWxz6CIfrmzM+FdQ3Err7/cG

QJ5ZjsoENgPXKMQtu6KfFGrYBhA+HShr/6MRxlxuP0
lm0MD4UYLwOgwZoALc0POXCZJtMeGzaQCg+4y+oNEobijl5mD7To71IBK0Jh7ehnh9wmqMelR4LXfmOv

HMpSc0uQ+Z/QU39busM1d+DyjoWbpDXUeBZdAbizlpkhaVWKxicd3aExeDREzBjcoBmcNHlbjcTyhCp3

A52gqsVFpFBLTS7Z7fLK6lGmnHVXD9/S0RhZoDtHP+
0oPFOXdQeadundpBwswGFANh++M6dOtDg2C2YBAzdtfqtOIOPFOmfjWwQ+1+5Gb8PjaRFs18Cx42amfk

TnMaHhLsYZWY2zNb43udjU3+3gcRiEbY7dY1FTSlOqfdST9oMN/oUzhT8fi06Bxwkb/hoWx7OY4GYrD1

cDfST4Ga94HXbAPT0hKFYPTS3KT6hRcNXsiJF7XEay
1xFkeJwpodWqHcnoNj+2LDILs8qYbgccbWMK/6cHsiT58TcT9GVLqQmMWDJyW62ocPDmrdUA9QXMsxAZ

MBm+WMGLcRlv+HybYOs5aT40AjdyUARxF3UJItwAcLwBStKHJ0/3OI/OXy99PkdxkvfcBhKGiHYaHTT3

kZWZPI9TamKUryyoFqz3Bhm+fqUJNuNc/JH1sayIJ6
iXtqLymTk9zefRk2UpMf2WvSZlVNgtuRSDd5vkcnuhrOIvy4gI4bcl4dt2Ta8TwiThjP2d5GHbVhAq+1

K2tIrAbTvN+KPvlv6h0YTissvLt40UqMQ8v1pvyftZ8GuUt174gGgw4IJPAMY/Y0TdnZ2JNqOu+FqI2h

HJPt7nvDH3YEmsUub+rVutNQg69TYvpAzO4ENFnidJ
BSPpP2ZKlEkZeCwo7hQFSvX+L3QhEOPWaHosvhA2XLMxCbVs1iig06TI66xR43MZJjJzNYnxEJJORd2W

OX0caA0SI7rVv7jLAsg5Fvszl+iF2MyRyxCqMjWoMrYjv0ZNgPgEP93U0GNG/Xg59ETBL0L1KwXSL8pI

xdeQjVZRSGvIZnU6nnWMgslPJAkv1CZ28ZJcK0rcyB
QWT77/3Y3zwg/EMZ7+9q4zE0RnvOdpXyGHJSqA5cOodU6yTz3IA+Evo6i+8MFb/qZ0coOGGV56M0Rt7i

G9DaXR1ecPe1ViYNQbdiG+U6hMAGcB+qZZrLyIxfUrQwQ5tpRlNiRmQWYKTkk+imBYKovRtpct+44lAF

HSQv1SZSXce3ubdJIlK50ggk7+mpTgq4/eC6wLAFqd
6L3zbzzsaoIrSwNUeMuqRiyuUBmlHR7R4bfkaaX3elUesDY397fMXwz8I1iFXhciX6QaxIeWsMrrgxX/

3A3g6JVLrKMOMxNJB+RxKcGNcsrhCoCiQB9rsOcGfvDZcKYxIVr+9mfpfe3FVepDfTklSw0ss6wgzyVT

eSlMtYgKkD9kT49Atx9ZsbJPZfT9549KYci2VZge9X
E5mQ6G2g7tx9Z+zN8E0O/izgaLgIxd9R8DDN+900IYmeu7FeUYzNkZXKBQNgDri8TH2q0vMc+pW+it6M

+dclLroW9NngugT2dPGo3u2k2oDuG9R4Za0qOfNJzNporepppW3do9WMnqoHkRt6pGNGauqkckjjmSof

RBjJojhPMEgCVNcngrz29DZaooEPsvrPYKuSQBTO9F
DJc7GH/hkl0E/Nlbt2EhroY8VVDO/Wz6ZgWe53gvVsEVUJFpl7O/piXdJ8PsXrxLojYTJcOx53mGLmKh

3M+uuO+JKtaWV/yBNO6/8NDAeFsuztIIgYlXzaYMpXhEPqoITvRtRxXlyKyC2Th5h9XPnl51lXTsL0IL

lpsYEvIXxAVXq4RsJtNK1/MUMT23XpoV2HpG/dAsbx
ATRFxAqrO4Blkac0N0wHsU/YdL82lLOthnPU+Gx70I+5hYnbljpTpjzGMqFxaOCSxQZwnTWjeq8a6gDD

oXDbKAex+8q+QUonSTPw8aNzfkn1974SdZ27ubY0dHuOWt/EYdWEEGIwZiyafUiTbPWmlsKLtKzg2FMw

DWHBq25N5qTy6iBhazKHrXH6gOpmFMK4fUNTOf70JS
2JHrYaPOcBnWiY3jfrg6k9koeC5GTkr8Wbgzk9CVRXLq0KlO5qTm+LOn8TwAeYzGcKyfdRXtLg3DN+2k

caPVyC1SLsMdVDH2NOrmoQYsGmlVgxpv3r0itod2Rdn30UaPJRlxl2RIJaa8vGU7YIINkzTh2/a5fi5Y

5Fmw1qKmZd2GtnxCghAY3/Zn/wsXG/9n6A+WKAzeRg
J8VMnim6t/AHj1Zq1f5EarpTl86FPnfYHQ7lNYqAQ/1Fi8PLu3Bw1yrCqV5ftMmGcTfnegjLgLmZaBMx

zzQvb2RR0oHcBHwW6qE1/2DFR8+x/7CcamenOS+LVokZ0h8ofaauC0HPwJZ1dNXuxjGJxlWBUsFYxG6d

Ot26AcD5iOt3q1zprAFr/mBZtA05TLTXio7f5JWaM4
r878xJDfyS8VuL73kI7ue26VZPWXeyTKCynjUY5NRSLCRNfjB8+y6eKhHeKLXdABbrHPAzxJKUC9rt5m

+NEDQLTfm50zgOhJH5l8LFW5Z33pjlOXWy+6vMUkSFVTO5OoPNLAbIzH59L6IuPwcTQNKabryO26nfjn

I7i6EDPLhaCjZAC9SS4qGxhk8fZJX2Tm63h4K1AzDg
+2zL3JsbURCN8VcIvGLZ4WzELfZWjzDgCZglebJt4S1g2oj8D6+TdtjXZbRAzI+z1OTM4RC0bcd5mu62

HqkbOFAOAuFkCKL1ECJB3r3ZcMydF4F8PGK6LwxP5Eno0iHJsiT1EHsrbFQh29iClhGaUoZ23R9k3Me/

80EtpWETi/AxMEG2vJMAggDPcx3x6Zxi3zJ9/38QEZ
Tw8i5XXO6cItBXiyrMD77jR+NoCEMlhOldlErvdG2VtymP/PeWYhyNgFndXx1qyCwKGMnOhXt6tgU6iA

p+dLheuzSV/UeFx5bWph1/inkbD2MoYtf6T2fJDc25HwXEHmtdBxnsc0TyjSVsSAg6nDFr72i7pbmRQB

La+iE5rF+D4gdCdDM7icW7c4m8+4sYnUpjM00+vLDz
A1lslA0H8PLrphxVcl61eiJCClUxnph6BwBkcAFWrjD2l58sTzMSejCKTvty4wzS394blvpP+CQv8lSg

OmP3idn/qafmonbIVpO4W3Rt83FwYlZyxP1lz8ZCpPV24KPaDIWhA/NgKTs9xyvWgimGbikXAlE6In+y

9/wQYFfdePEgB4+OGMQNYBR6coZx5WXdqrgBaQNnyu
07v3u6kK2S8w0h/CoHFlYC4KFtr+PP3C6yIITtcEby7d1j1K2pLZL4DOm8/l8p3Hx0y6rZeansimAxR5

DqikNn6CBmApAiomTB/lPC5MXdq7ddKLp85pTYsiA3kmn4ZMn67M/4aYVA6SaH74fmsbjTzQxeyQalh7

U55gwiI9c+aZts3/gpyiE4In+6fpDQWlWTHnKXfAkW
jhWVAeNVxbiImvltS1ryPIzbdP3YsK1kNRBn3kl7baZrsyd0esvPPaHZPd6xvGfzbjqv9QdOmPJheHw7

20TehSXoRMtZDGgBBGHJqdwo70iIpU0cc8qRa35rED7RFoiAcYwPvCsVNRuteCUF+jRungadSlI18INj

avS9BXbK+yqebj7Q5bsFN2+l7Zo3CKr8C6rTyDL0Yq
amTtwLppwyC2FnHYeARc+eefdgsXgo/ss8SFmaWZsfTGaNzN0Fa9AKzlfYY/FMsySkqR4qq0Sl1CZxbY

8hMqBgH5jm6+y08U+vnp8Fa9YPsZRvW/FTA4nx0+zQW6rv2wrgi02k3Ia1/cYmJNpVnzOmtzQvvPVq9+

kRrnGYpgYeIqIe22YDj9Kp2tve/6yGtaiTMnTunxW1
auXKnVXOtM5EYR95jL/OdShldrgknlNX8N0CApo055xuuLS6gKv5GkYxw5KHnzaslX2TCs7VeW9H8PFS

aTCcVtjSsbJ2pNgf9MSDZ+PBVfP9dwrRWl+X3l4+Y/B/C52Uyb+A5g4+GNsBBj8VqsVqAJjb4Ualo2rL

xnOcAug3c9oOK33b2h5uZrflhcgv/zX1HOtTnouYqe
NXR6haV20/aWKmsCodEr9haC4pvyDY+tPkYmTDCA6X09fRLVFJPLlci/S+2ezUbxRNr5LY1EuzlXmH8A

xEEUHus5QbMQErE8nPHzcFJcz9IbhZrcbJ14jqcHwfZtHRGxKcDedqdme4UmApG9NYCvILzgCS/rjj07

xxbD+Z7yOgWwpI+HA25K1qVJj23uwXKWuyq42uw85g
7arBZ6GKecIGMFQ2PVMaWIYMjH87H8dLEfOtnBcD+MjADD7ti0ToRdRt2oSl1zprzb+vhxufhi8Yz4//

Uk7p9mTY51N5aFfykJh4un/cPCy8zg3d8/QdkoeIQi8XCBPj5AEC/v4yvV5ielsfoaHs/bqq2GX67g+s

dDUZ2BrgmsUFVxuFjZv/eLkEJ18TFZ9hh767v3rPeX
NGCHRC4bd/2QISFvq7w2pumaNu6vPbMSYZ/gMwqgGRDZY90fx3GfM7laoZOPJFFacDqgkOeWjk84T+M+

3bLjvKY/iDFP26vT8FapZ7hokiI/gsXy7fg6xphSFKZRwaImPpJKxc9cVN9tb87Gs741YOfO5VSZMTmT

TM2hkTe1xjWLUOga3s6fPuk1W1K39qeVMDcy44Ty3E
4w07LzKG33P/yU6aAEKXjh2BwYbn4Xl8PltyJeGkDT2b9f2nR6m0mcsLvp02sDlpFjLZB4mavB/2LSek

x51wbWzvWif4hO9MxE5V9yz1GAUE0Fa3OqmHqIoi6rDrARkFIM+snyrp+aq8Tr8h8c7qKHuwH5m+U4Fn

EXHMsFRtH6kAdkX81POVJHgqKLtiFffoN7bGwjheam
QAoZXjDKlMiIGlpmUDu++b1yGD5oNlCfht2bZYE/sxV10uWrTSg48YK4IE2R0bx8zNzq2oqIncdDaAA7

QADqUF0i5B1gYSdwQRtqo+JWGNl7SBUDchjUX1q0pBsUb0TaVxgc25FckuA+TN18FZZLMf214JtJiMm/

L8GN95aRUM7Ci75RL414aneI3vAUevMVPuFya0856l
9aW9k3rvSTlv0MBPAdJt4ST05oTgRXo7AcVptzV8Riapw7Ki2wl0Q7sb1dewS1s9vIAKi2oKJ/cqL+66

hoOLuzrp5viv2TuRt1ZxreoreyK6e7QzyQ29j62O9IyNO/tirf4bbK0mNjqqMYZIUJdBSqGP7brjwV6x

WejfF5oJyCzjmD5SihEWJUE89YS/DUa43G47yE5KR+
OBgc28uzA/BXDW+GcUs3LVUbN3Ke6rTSh9X+tLPIAs+00ASqaLXFEJpCmL5Pw7vO43AlUzXvKFgPQjkN

F2ZVfPRGJa2+uX8d5OdwTyyZ3xyZrbP5OMQ7coFH5xLbIG2xV4fWdxc0u1k5uEefFgzf9OmjO56fU/OL

TmobQrULqYWVzMic5nySfW81javINmsPdlX71UMW8M
sClma9fw3kbFAGAFgDXnTgqtal9xslKB3EtUUeHtmOSLTE536JXiZuN33zUB6/qWTe+q6IO1uK5arqy2

cP3Gt1jk+BjT9i+OSos3peGx/faURYldB7Qyod2ReGdCrA/iBCODEfxsXGszdsa42t1yU2Pd0YCU6gQN

4kViTUw4I43PQh87utBYW0wh2POhoa6UmtvveNtMwz
rp8DLpIa95BDDu9jhjWFBm4hp8BKdMTLQ0AwZ+avhmnREdFzS97Nqx5rQOn3js9iJ6EmU08Q6yOybJyF

5clYXkxozIzbi3cQTSy0FAR6RinbW4wpN/+9Uv9fqcfA0fIWDz0z3L/91H3sYApIRFScdNYJcuDYeJWS

ayjMCtWOx4HNc3yuVuDW5DG6jpup4R5o/PSmfywt7b
OupewZMesNxIPbC8VhpUZ1TJN0MEXbJ+tma1KTlONn72bN/5hmBLraY+ODFuZrdRhjMC91gy2Qi6YDqf

AgWOinYKWgqlO/6wBeYarVEgWuBHqsfdwUuyZMOiOA2M6nkVbcRB74cQkHodf/gnXSUepAtsMo6aZr2+

HjmUX1iJ/brkg/OykDHxECPYVpkEpM+oFGm5vdr+2y
zEfAkCTVjx89yvr7cQ513jVpw5FGUAt4nogt7i16aMRLmHK1UllEc2vDXhGex6lW60iQO+07yVAX9YmK

6OHHsTGnk3Aw3p1V/qQI7AYnZx0JnX/eKa//rz/72v2wedLYXkVsb2CkFT43ZGjNtVsr4nHeLP6NPqtU

Ug3z45ntgqp3gABsk1fKzAhRowGAYDAsbnmKjt/vWC
q1b9iD0WheVjcwVwlX/e46tPOyNSAF5BicC8sc9rGQik1IT/lMVBd/y5BSqfxF6l7M8J45BnOcwriE0i

PaswJTtWT9eIFbyltxz3sikcXrTofVb96z8Eo9ekQ2z1BgoooVfWXwscfN/fehz61eROtvuCgr4AKfkd

t6UO93fz4+fybb3OPj+DCdgt6psY3Kipr57L6KwWUw
zFoJmFsRtvoFn4vOaisRcBiBLJCAUWNzLKzbv4g8FganHsANO2/7/mg6asN7EUx3e6AjB5ORTv3844ye

QyGhvvNOz1w5jZT8uFZ28wz5BRfrH49sOkmQuyQGgDwiPL2ZLgfWrXGH7mXjkbqLbzgcGddTrTCDWDv8

R7ruqe0BvaW79COaAm/cFxniq1etfi27B88XN4rQ3P
fsTxf6TXimU57LJF/CYjEIjbK3SvnUUMXx+Kwq6fUPsUpGCjQ/Uvz3hDW03ECU/3K5R3Fhm3te4KjQRQ

+jHdt7itw20uCXzJViRnrNBmM6HORkIeuY2PU/c30VrYRsjkMa7KMNZGvtxv+jD7BZrLAlPDDbUSsoRc

rVLINZr2kNi0Ug1tXoPeYoIrUjCwmUZnEUwN9XjIbe
Hd8kfxMhs1lWkBaBsWmidKPCDyw/Mq6O/Ut4733sksfaSyxNkCR+8IpNP5s5DYElqNpuM4E06XcXapQ0

6JwqvxOeHkgYe71UWO8D3vPrHzVSekcZqgz+RdWX5m5oXTC6svt+CXuSbFuTO6cQmvhFopqfIa+qH8Kn

0FcuBTnqDHXCq2ajekjcXst3ClcGEeCwARdY94FRF/
br8WpOY05X56e2+Cs75lUJW503gCh4BeUGT2tgoYm0PKmClccdaIzv4ryMLQ5+5k1+J1zArLBHu0X/2l

tHglsDyljpd1WhmGi8sKjiVTPiRmPV0alaFEn0vz3tgci2Z4P0FrMIlF2PSIWUt9Ece5FqtxHmfqhEaT

sIKzgDlzOnLvZ07fhMbSXKOmw08v9osC5N3/idOaQu
gfKFm+kfZX/KrRpVWq9PQeVX+OOpnTNkpy0ukNfc+iphQlX/He09IrpPCosiDRWpEEPqkbnkyABT6CXw

arIoj69my2a9L6MqmCB00x0nK1C2sGMHg+0KG7N83Kzk7DCgNYxQgLIkg2k7/cX1/JMJCe9OmhXwXVc3

ysjh4q7hcpAWr1c/QvrLCGE9ngKo4EtcJ0A2GRUvtg
nR0RJhw4QC++vUnkB0+Qvl1GsBC3esFvdXQoX8GV9zpxxEvuZD5bFKKLSg4A7BDluKJrEYf8oAB4/8Oi

ggXQE/k32kZXTU/uo8ULn7zBl7K679DrFnwudiYWGIGfNV1C8VaTEu14/bzVInnbVzzFTr2EKrG6FRoD

yudF7FL7pd3Aeis5TmWobf75nDtE4Szrtppmkp/zG0
7291yMHvf9VLwnn9JDafiLCXNmbbK4RBxaNYw5g9IqPUKHo6GzrSCyc7kTyHtBKO9zWom1qlE8zROGi3

HHw7Pf1ipOKOW3nQyOkvKeWox5za/kS6LO9I7zCgZu1RMoGqh1pTKGMbsQszVkSgwxDZcwfbMYvY7/Cb

55Npf4cGcty7nqBiecfUJ+u3TY2BiJ076udwh1rqev
fBvj3F4s75G7JTU5TNT+or/jV8gafaTfpiRJZbuGRkoEekXINqlTvf1Q1nmRvxtvzk81p8i1Yt+FfjvP

Ao2jmxtq9JRKRRCu3BfLK0N6Hsz9fftpvV0Wucz693pbMAtvU1Of2fqPYpUaw+Zq1yfR863Yx9y9jB5c

Mja6uvC2YAZX0Xrf+ZWaa/IIugF7RBtAwl5nFtKqje
3Gwbf+eVhGYAo6V9DfTCVXIbdspd1pMq1SE0zP3jdgILkyKz5WhvweGq+XSDrRDtjbj8x/xWTA0CL7tl

AR9t4ICaMWlcVdhuZ8dInYtu1j3ZuvzhXLN22PIJUmpawNL/UGeagcOdsEUYapn58ZUycRHvx5D+0q2N

lRhA96yrUPaWtUM9GYfxmcfcLG2Uzv90IWp86wA/pq
RAqlUhV5B7a9XR7w1onDkpDpQX3N/xDkvy/7npzQ345wU2q6Gh9PFDxt/UTmDLrc71po9yw42/VG+3IM

Pt754byROw3sNEzTqRiWPzhXHuxt/ohp3EMdB/MyWIMdwpwSyufp7Y9YtZSo6EqNZ1F4XbKxazcxaN2y

cStXC8prvkd0BgosBrwX01baBJukhmRHa0fxk69WDL
aVHmhGtHwBNVwibQM/JYid/EjUP9e93XslRqRq5T5zphsUXZyb1yrSTtt09yOojwsQcRw76mipkgKx0u

WV/J+vAjrF7i+PgjI9Jnxo96PoKUTwsRsMTvZoFcXCcviVaKEgXCnJSXLVZEH1qz/9O/TAPnyDjD8jKE

U+7I+avBLmdxHiLJILvEH6iIyFEY3b4+64QzEKJlbX
4hdeP7GTNP4dXK0oZLq3jEupvJmIm5fk2sYYm7UNLGeNjaTBFsx8TQuHiCTIxjFq+wEiGFdYytWZWPjB

xLWTbAO9F8rqrplDaMrqAWZ7mhQzqCHgHv125VEzzv1qCVVX+DD0I5Ze/OWj1dcg08+lOi3s05SrBWCZ

PoUqqMdWohSFX7sK8eqJQZ/7+b932BJg8xhv9BCURn
fAoHLHzMd5BiIgCMNDYZyUDB1mAyAoFedWSZQnqfLdKWYaUgDtOW9BDDVTYYbGc0567vdtk9jwk3y+ec

O+ee3x/clMlsBi9ljrewR9j1d4cPJx1FQVm5AuWh1hr8f7beYj4fURUgz9hZWn0mamNEEjh7pa1cvjqK

vE8rMHjSVyzUpCKS+3Rrv0Mt56/FbBFmSqcGjG/D3q
pVOS7fKDemutGBAYCePJIjP9hclUF3858o6exIxtVG3H2O8ECq2HMT4EAHZ0tWgI5M7Pa1EuA+68f9hI

OaWu9r21yWCi6rNl4usxTgNRup0cEYS1ExfYHaE3Vhv/z87vp7dFHSEdZ835grf1mJDMU2465XC0p62C

h1TYAnbtDgZ2c8I87R7ayEGaF9Fw6rYS+fDCFqanfv
WMTlA1c2oJ4sWBYLD02RZ/R8FS4k4vRKJUxfsuBVaSIEG9f95jnpsUmjBROF2XtqBhU8crp1WUDBm8Ct

3m7w0XYDbWb6t2BVQyn5OaXloxOiMTijOPTm9wC/N135q2+DcHBaCH0sNu0rfcxqHG4+ovEtRRrbqmaH

DcyKKy1oxN/nh/z+Qt7NF5EVvzvB1fICR5dJILtjvI
5kiro+HC69cEVOXQCv+k+NnPk23Lv5/+ZDShJCfMXVxNyDA67j3N/FsLFlXTzmkeojb1ktotjB/j3BPn

1htiU/Mckenzie/ucJ6TxxF5ZPYN/fLWk9NmN2LrZIf8ZCmhPIATiDKbKCM1ZSWiVo+y4eB7OgwKWJP50pW88u

ylBY3I0yLBT+i638o7CyM5WiYuD7Z0w2BZNO1f2LEI
8RKzPjEpc8NsL3iHyEXddOffsPvvDOZ4p/suUPC2rYV54b9q3SURSab1pt/mS0th8ZYLgUob5SImepLN

ZNFW/aMLe1snNKOrn+kyOcQKG1pqiwWpT8Lb9cgw4sLphITntilqqERXpQcUaBhtZBivQuJMVGK1Sqvr

i+XtOk18rxOtd73oT1juqMa2w4/4M1JiRN46FvONfw
BEy4rd8jOT7OLvvXgWP0UXn3QsNpiw7sCZqdRfSBWs19ziYyV6/tIZHX8t1hNSn7fU5JS+4S4lEZoHzf

VN6yzk6m7eeemCMJLgFokN8wn46llYTRc6ba+SRXScp4ajhlCw8/FkzlOIHNf6OQnwW26ztQGO7CYvUT

pKXlyMM9uIsqzbTlJ3oBHYZC2CP/pdGO/0vspcGcJP
RMxq8RYkLF+AL+/6nQTnvlsUXnMUy0ir+iQ1JC5fMcy1eRIrI4/Julian/bA76fg/GKHWRkZhY2sfEf0NiR

xK4uezk3wrzZz3Tycfm27nLJCd6l613F+FwYkAhba4cre7TgvaVgKaLPpDhBah/K2C761lgm+EJJf22R

vBq1asdcYOB1NHq2zmgsdy5NVWGwIaL23uk4C5E4bR
XfU9UlU4zLWBO4O9+K8tm/C+PN4msZvLkDS5URHoblvJur52+2da/lk+yl55SZ9zp93hgsiDK68edagn

lPtHNL7YZhqzNzZXC5g7xjeG94rhGzaQ1tZp+Z8LIWAYxEUsJwWj3RvPui223M0s1CE3v7bJdRcvhCi1

pK20xfaxosFleDXTsy/lBRKr2TGS42tRfmJl0tmqJt
MsurlMfFd51CPZwnTEHACSz88QVPcZ9sjwGdYdsQ79EDtMF/ukAws9wim2vLXeXJ3J3mRxmr2d5apvgF

XSKooHQBNeuIYaaI8KumjZCAem5b+8kydh7VlziM44KuvoqyOH0aWXlxorm/NAHdqvy478jbWlwdG4D2

+S1Cvn0sjMrkm5AFg/Z8tfSY622XStI1oijgMDZPMJ
SWIWwlw5W9pCQKqy2BZtx3lkbk6cNItccjI8p03INDrXoKbHk+3ur0uedD+NQDSabPf69ZLx0pjUKbRW

Kiq526KyGAo9aqXvku4Tsc3V2bu+Y1J8TOjbfsaExNN0rbx+1MSWH/Ozi6/0+ca+QaN+VG6jdXcuPdrP

ldZ/Iuf2zlvV0KQoNWaY2J2fc8z6JrpP2Nr/SI4lfz
bw+sjlEsbKdatk8+By8GdbFl4O0AuoImMwplns7ilN7XyzZ+6nqefd+dkFJ/4Sr75ialMVB6oTD21wM0

wuDvZ/0bm3/Wpyruz/JjviY03L4eWsXIHKa/vYxUv1GedX+HzxDCk76GJuZZEaYC32+HZttAyNWA3xOd

MkHeCOTd/to/qEXzf9yV7YsblXJHCP1uTpv+1wBV8m
qUvPHc7rvOhbdm19+BRzFKQZ8zQFH1GHI25rZb7JNfpoWiFyeIh3ebYhG++Qy6VdbSvaPjqtaJ45ZVzO

s6nMNe5xjibCQ+A6VFBD6Rahb7yVjXPCcz+LSAeC9UXqRxAfsoRsA4pgULCK0hBFDFiyhmH4owVN8La+

mKLb4EC/2Fute1ELcgdrjJAZ8eq6FOOaNvZScOU35x
tP3KHqnxvCQ09kPHdyn1atUGSniQ7Da3ddPdlT7XBU68d8EW5/ktHMtR8ZPpak1kBniNNCqkJqF/rV1V

L1w2TJC/qZ7qjo9U2I+4JfE3ADxL/9ECcT5mqiw7U4vMlZg4NtX5dRn3hFQ6xt2B2xgrQhsDcmq6zqEl

VANrB7HDgLxshZ/IO69sU3Lso4rMbd/q/4k848fKOZ
d/SUBVZurDyU9COO/8/pIvAvSWr/mZ2AA1Vq0V00b+hYwEGiztyt9KPgPG9mgNSi9eWui9Wpi8l4LqYH

mRDYkmS9p2RxHPSL5laHr3wPakzrC+/pilL85bF1e/Cx/T8By5enCvgMNR59NyWg8VyapZlmLTDvQY0f

D545BHoVD+/j39ntaX5IFTu9WpPDWv+8+bA7wfQaI7
VL/2Ugu8xbXiXrFBRhuW/lQ4r6tA+s2ch03/PjFNo5B/Vjf/5Yn5V6yq/185t6rWs0Cp/Av6L+i/oP+C

/gv6/2fo/9ufxe30K/0QeVkhv3mtaQXAyp1G9PA2sSMVMGv5S9/4t7CfnohK4SSfnlRPorMttAvE+sdF

pzdbhvCyA/Z12K9X8/WjHOsaGjN1/Q+Z2dgLUi7+JOSE GUADALUPE
Susana/xfVGIqdZJJO1BkEgYTGnEim09AggChCpEJYeeRdCxArLNptz6pbD6RBrL9thRMFoe1+0rCf7GtSE

8/0KUEAMJ5tlAANgQZRphIE6WqWrjpJiyZau5QeFo1V0pbQjjy6uPJKvmSlc2mXxO/kJyzYjg0Bzs8e8

GfwO4MGxUn3uvgtbpRr7+/z8Kg1cNpysCyYV6GOJuD
0IwM4DzyiJVJyr4G3HbeYa+DOx8Vf4HqzmMCkWwWefZi2rwrGtbz4H8J6mN8Nmx/mh6jESJiDglECuGo

bDDehks7dAXaX6QKMamdOfT2eB3Gk8BhyW/Q6TMv4kTIOIsw/giyVYDhKDVP6EsM/RZcjzs92yk1z+6c

NcCEicu2rQvU8Rcj3CITkRFMCjNhwbuta/4bCco6l6
/GlZZbVjIxQkASf0Amfn/3g1S1rxAUe6C5nm4UgFaSXI1xImgqXF0/kcfcWZsfE03/4/tK5YLLJmZjMp

FbjK/CUIPW2EvrXidFWbtu0tbJta0ukbeX2ZgUfMZ1KEYMXwutmTJpubQUiws/uFWbsFI+M3XKfXhbDG

dnqqSVSXb2H0NUBGHy0wak2uktsrcl+4rc3aH1HhWp
bfJQ/l6GaDzJ0CGvuIXEsPBd3y5gh/7OmsybYhfFb79tDMM73+konqC46bhHKUPX9hjaS2HiuhplNE2S

L8r9sagOY1/0Ob8nXcLIxsatgrcSbKdJFuSlxFTi6yw63/bgdw30K9vY1r0Ew7LPzeUp00ZQ/xIuLbjh

y/3xsTaQjuBzQJmPmVNHG3tYHylLYfI5U2WLaIpskK
vImYpYZBpIC9l6Z7GiNBRBDr+5tECtHoTl1rMC8acl43vNSooh6y3KoDEPg0Igwb4qe6/9gRiEImbEaF

lq82ZLDUd038e2DRkW96tU3qY98dPAnoGq8LMlTqN+Hx9i2dYbc3aaP9EDA6YUO0WM2QU6T12UjlXSWt

C640v6BUTA8pugIZ2otcN/XZ62m/kNACtLYej5z9fM
GL+XRzAC4YVo559TpgZKkWukBwlT1dC65bihqgBO9uByNhTutpa4Y68h3gh78eWxPmCDf941nLIZq58E

z0usvu8CSWCtIhoX4Y1rep7MDqFydp70gST6jg1D5jH/y9IIfOydsgfEj70n5xsBfVpmyNf+Dmisx6XT

2ND8ZIY5MuVCQEw6hBDFWWgQpAcU8Yc67ELDafdPYO
U53r8DSBDBD0lg6+ax5UHDaRYLGx8GbkLW7DkiK6+rtJtwqTAwIIw3Mgq6H8b1O0yYtP4R4BuZHTkL1u

eKLEGu9MUuU5u92Xeb7F5So5UhVfDEW3lrVGexWZcQALav0wFnYYCEfyK4GPYCmrYrOCdwimralqUVwG

cy9fXx+n35z9E6fxXiRkoQqpzxX9twNTM6sC+JVJ0I
tvAqPI7Lo/a/gmVvlww9cvtBbDdwRPPH8q/LgPQGYkOwxhT3OdNRBJTX0u8jcfN94i6BtILCtaSWIYoH

odZIGO+1mWGM0FcqrnqlX1MbVtYHmydVO1Bm0L14hzLoIft7acZ5oNkdkmnrgA6X+tICVKn9xh2BdnAe

Q0ytBy9uPwbsQTWTrvOyTcjnQAJvJkf3AiQzqw8ZxX
hjZXcYj53P57OeT3vxmNBVbpev8LfdOQMkNbREIyRqUnHhFIEvZC1uqJQItu82VdfH3wciK8oSgAcHiI

awAmdoB9rnEAY2zD6H7omqyo3sCeTmippcQ4qUO9CizFfMYUbRlvufevJ84fOrURDh0q7oIq+OaqVwLU

AD6B/tLX6s+CtPtJ162XlE+G+KqkXkzC9Nvn4vgLaR
zQ1QITsX8A2TCnXvWqmtmSCJxk5P3zQFAdRPDVKfaaswPpWLbNFXIUgOKbkOVMc0cymrzWPIbAQzCgKs

EykwzFw3zHptIjNNynUh4eQPT6V3CyBaakCiqQhafKqhBANYMhn/KbrnoOW1BmfQE8jT/kUDSIGs2fa7

o+2Aig/W85FxMQDwtpBwNdJn5yckggm+MIbLsxwOBO
dTSepq9ek8FhxeJFNWV0NCkd2+cYEOEZMGJNx698bRO57Fp76cM0Y1HTER+kfEX0Ey0+YaexXrHL8dgV

vaU9wr8076V3zo0hev73wqCAo2oRN63D2pKsc2CUi5D419R+Q7b4x5eC/r7fWsVWV6RaMo3/B4UOeDC+

kANPN4N3Gud81N5ZryuY+E92nUmzcKRcznx0vuxzCS
qUVr5/uW1k5GZKGiLe4EcO28FSwgnJlYxWnyMZQmYFKglr4sgzctvSfY86y+1TsECDiwyiVlZ/bsEU2d

+XMDIc9h9RiYZNZJpOzecSS6C2IE1RpX1ALRQTf5VwQsBIFB9OKX+uU9pyelja/DvbbFUsXQt47UsHOO

Khrrv50m3NIZrGNzGMZWnODjVV0lY3iyWa6oOncYqm
+58AhlLTAf0xC7pQUvKp3vI9zQYvaxUkzluv9ahf+rzfI4CCl7gFUhLcSwYZoaTf8oxX6ejEZglC9D7R

buCCfsSK3w+tw4dO+eVqXMcXP6HQV8/6tB2e4kZsjdiN+vzvHSTXlIFbtCCFCl/WW+BRtXbviuPGu/f6

HcnpVdIUFgQlqEdMpwptqH/NCfWhHD91eDmj2Gb5J6
pZdvOWsmXqgMNkxS0CLEMOVqGvOlmgD5D1ik9/QSg6eqXe+ccMsjlXFgEBuGf0rruq5uIq65YnnGp/+t

F0X0TJPYrTY9O1Mg1o49IoJYnjuqC1DOPxkqtT21NhaOuygKjLjUYN24LI6F0Pc8Mjmo9DYWh2lCZnV5

BZZ3VVLOad1ao7Acz30I+PWcq1/LvL5jFt3Wjtb/oz
eNZwEzRXf7M6yc1VYZ5AYeH6wwonlcal3hCRGV9WJIQX6KIT+6OokusEhuJ5fCj4NkzyEopswvf2qVRa

IvxePL11M4MqbM/7eJXQs3L8FdQnm1GmPaSYv2/DTLw49/xo0mpeKFtn9t0qKUcnE/RO+tbGMISWfEaT

WDxkOtwBq7i2I8iZtlXW8h/uuw3j7HCkoO2Sg80+Nw
FlymdV0L+v6od3um50rZMO02PBwauMDL1BIQD1bTxqy2vwmKzrfJehXuO/4HE+gJV246n7MszgJei8Z5

OLEw9FM3dfmR/eNwqcCvXlvg3m5lu3fa7jtq69Xndj1zAyd6zMudo1ucf36nwmxpEwH0b6eSswzyxVs+

xxti6Ww5o+OTtwq/JTkk3s/GRYL0VEvnOu86Svv877
V2npg4e7kpGPGydQ4UcfVUyASKH1LcKJawD7Xk/nYnhmvzHJqxMUjLhQH35nJMgvN1FAqpiI7kmwBvfG

ATJ2kjOews9XSf+r4jESvr8fh7Ajl4N4FLT11sm9QleLetse0FAiFE0eL/Coon+nDnMi2Tj0J8Hg2UMYDU

zvjsqddxOWAZq4RJD9tjgaNQVuIwLqa259WW4FnFxI
JXVU44io58pTI5fxHiv+cift0Rv+GGhjGOyMdwUlJIYWyz4D1SokgrJR5QVuJZ5ok23zIzV3/zYyx0rp

iU4md1YhksJqL7/pYBTrMbSZMIUFbkWFeQwRWQY2ovQ2a9ddkONe/iNthU43I1caw1pkNmH7JVDgo9/w

d1T+D73peU6uA6r+I6slcHtcEzs5BovgQeUuAwkILV
8MNY8MuHOiR0K6a415/prOHs4gFNEaNN3ZePo7f+Eo+Oh3uG20zaiYtZVrKwaDB8L0hmZ9qCD9UBiij7

iCXJPKMsyFwmniO7UmhomrbHl5JlSSVFvDg+xLLEKQaUG14iR396FodtrJpIMISOahF60XI7489X3CxG

vK5Xnb5pgwVKanynOa9DyUpEa98sfN72iODIwnd0Pc
HMKwYlfyyV2XIUYEOa18EuPjIo6kojycxXIqeuz9FddEJ5plSvT4SelTMxG4vN+vAV1i1p7ydoa2tHpq

k4yNA4Na3IwpkUakddE9arVyEErjj6pZ/Of8OeZO8cuY5Eo/L1SbwK4XhlPsSvKZ1Xso78OxrRxZ2uSY

bsxNB1fdvw3pEusVz631sIF991Avv4q2de7yYdo3aT
N7zL8b6PlQ9BzEuhkAwR2bC4aoJVd+ggW3tr8zD93i8hLJHYdw2l7ryacVcNpcWoz+q/R8ocOwNwEGSK

9bjwPa6qDOQ2KdPH4dpsBfBUjuJMYx092HsY7EDj9onz8BEiUEL+hZMnJUyDt6J10l7Ce7xvHlj8sotl

KurLi6L5z5HKpcaqyh/B2715CaI14+iJEfpYYPWBf7
wHLdviohTqN2HHoFBTrzGd9xzXss90oN4u9bMn9yqFU6b2vJV9G+3S4r4vqjfFlhdsUIDuDmyXSp+qJw

0/rZcZwgUT+GhceyGlvczkjgaZschX0tEp4ySRerJQynlvTBuyp/2wcRSVfRc3vqCSUzxhdQDnWyLis7

41pfIrVemwKvOltemsVqBziYZ6IEPbkNWSlMCKUgHZ
ftUuS7XQt1GZHLJ3yDO4MpYRzfF3CMat4vOQ+sMbp7VQxcZ/5gMW6SNr6/H0o6AB8pQuNl0D6TRytauO

WlVH93/3yWOy4QBF7wKY5iluY4BH2SMSpdAocbFF/hJwzUTF3oAhmolp93d7bDJ+3K1lgy6FrsHNqldc

6c7SXwpTplecpkjub4/VUD43fKOWqd+EOkeUndvEzp
34OY5fENn2M3gl2SPr/4KIuSNDXlq/ugIk63fwuLREVQfMmEAlgRc3sFv0ktuuync8OkU8jsB9yDW98/

5K6TB4ci3K/KSyWRKJnlnkotK0FGV61iDfAS+P8BuBP0avanHHIoJscr2kwejQBrotle351i1MY0zzXG

ox/8uFkiWIHsmEg1xK+DqmdhkVnHJrX/upXe7ypJAT
at5g+OdbD61C4qq1KpI/1U8arjLbyJognt0/wZVc3IGp+dQzhGamib7Ig7YpO6iw1gh4npClX+j56bW+

msckKMd1Gum+XTF0SSdPWNA47yiZ7+p6kckzZHv7/TSsAHgXkoBD6c3SIuHtSfaOmWlus2fwvcCkNbqi

LR9s/4B0Dlm9Ti7d6Lzh7mNVrrUElnPWHfkoVskf80
pRmcfEyweWYhHPKf84uY9WKU11eKcXEJ2ewT6wiGlh/APau2F90589HM0T6PF/SpmPo8Cd6ZSvE7d58l

5sCPt2PNfT8lzGenayPZ7sgbJVJqgKOnnxcV5oocmo+TX0hXDB4Kiy25TrgAjk/gUXVl+x5QI5SK4QgR

PIDf9tsnNxwGOjGq01+8NCZFkGS2sOeWSQsOzh/lrA
K0npwzygqezxOtB4+eQ9HTL1RW5lzGCMr2cyc7wSKc3QIF/DC418hwmoosjNZf42f6Pg3cj5g8UMDQV6

5rQ7umIBgLSf++7Q1A1VfhoIxTK655WByM4jHM7EovAGX0Aud1wPHtOIzgnw92KXcKbU3gZQo8hrxTW+

IVQoh6eFMl2MNyfHQjr99Pus7HYEwavd+6VVyAssAm
vtq2CFrdCtqHzdLGkQ4ku4+FUVfR6ehVuwIM7FWnhyQ09WgmzkBaFx+pP4vT5sXsau09AY6LATt+R4RG

ObKHLXhRihmLDjFnmrjMtxRqzp1Q8+8lexlSWnm0lUc3pWseUUPCcFjelCQWybw1KEYvctbKQlBkxJUu

C7Ns9zzNPOpDlnq/cEsq+nSi6Go7rQ5ceQ5L5T0z2g
DLpNrCtxbIMR1+A76tXa5BF0GmauK+9oBz1rx3ON0oeLPngxK5OSMS5Sy2XiDFmJy5Nq1FNxKJEtG36c

4jXIBRxKgwccWZXo8KIaPRqYENNg1t3hSp/aVzyB8M6aV74VfpLiP4kHFOfu/8n7kYWD/K85mqBUrxj+

sxTco766ZEFp1umTtGJv6Kp4ryacyhoJ6XADo+tnib
OL6uJM60Y2G9CJNwvfLH4RqVp/V87Vr34RK+X/z9eO5D2e2AfbjVDSMvJcdED9bnYy5hJBaZGfcg7CnF

fGecgQGUf7DZMmS8KsQELZG3ZoUGzyIOypLHsAUm+e3FoKDDIKfdrwQT9XxWMjD+aBQTOdbhvEzEvdF5

TnpVZviGdGlgaHBzH0qurNs6/5DqbOlWk2Rrn2mUdD
tbf0c+pzbcN1qsghGA17krbEeVlAf36ZbjGiikGDLbhza+rH0s1JHx3ac0xx0s/1uMjRA+9BupG4dfbA

A0V7kD9i7iDThx6i+uPn8KlunR+0P/8IMjemvN2W2xk1ZWLWTeokEelBJmmjzCUgYVTG3j3TsGK87Gov

tSCSR7lLEtDnTtB+2sZFD87zQgCEgkTbOBMzqM3I6R
FCJmhVjAnPJTaziCSQHQeoDXwNyIcCPdrhge4pkaoEfzz5tRwzU/QbW0Ra3EaKTLJhetUSW5iSeREApn

NpSgG33tjaAzrwo66RyuvyUcnf1EyukzmlZ+tbkofQcSJQIO6Qjh4HhFYba1gVWVB8+wp6Mo/XDrOyzG

Hs5nM91uvaFqfDoQbM98aqaQV3i/hehX7JmH+1TkGn
CjaiXZSI+7OU5gBZYqaTdx3GDjp30xCSrPxdiYgvDgU5tkhUkSeRspG4z75hUqr6V87DlLZDuPDtrc3c

RndXeCIO/ipBViFEsUloWNdIR0Y3q+Wic6ZSkUhl44ptYz+4Ud79/TwKfm9tl8J2D1ssgmET/qoLc9cv

R2qP04T1zt2MnUQeXzHLLmH5kfJltBn0yPf3ZnqeYU
jKNBUC6Mp9eIotmJvl5Y4IENRZwRI6t3M2pWGzcAq6N3I8EVX1ArcZ/qRoIoB3Ta5TZN8CwYPstoAR+p

PfRCy1pDQOCcPonS1Ax7+CU0Sxa8nVYeOADfX06Fp4DhWFQUga+S5HgEV2m4e5cqctv4u7Up1gbQ07ou

Xn7I3FYuZ8DwHJOqCJDZ/xr/h0ytqiJ1raatSgRjt3
EgyNMVHGM7zwJx8eQSbfUwWEA8sZzg1Tl2C262UtkUjdOaUno4eJR7LuT3mN5V1V33O21FOjUgh8KU0t

qoC7tQyjer22B5Br9HnO64/Gkym3FrMvkK27KDPQ166s58nbrcW2TKWLZ2Y9h400iNP5y6R4H43pExXp

TGuhJxkiqCRtBTaATXwl6g7RBhIvRCgchhIA0ub8di
zyRCm6uhR3n4vKULWQXeULyAj46okANuJSjki4P2wJ6yM0Yl9kbXj2Sug/bd8Ee1m0w6HrrDVtzJSM/o

jwS0ltZUt0v+px8vh4uiUsAm80cLcOsW+AeQZkPl7cKhPsu/SuvoCyiqgW8TO+2zLDxdlhCFHcE1MRoj

oHiFZa1DalYsHFgRocM1fsDrKxLkiydWkyQefd/iHn
yPO14vHOL64EGGGKcTsfZgiV8A/ufC//mn5d9/LpjOv0YH7YTpNfXvM4t/UVcBI//7yzN0l94/MZn4j5

PzJrKNq/m/JsH/TgKY/ov6L+q/qP+i/ov6L+r/IOp/476F6njclJNXx6UHjtqpU6WerDHVaiLfQGGfYE

Si1Sq/doWPefQ0L3eD7gfPDmf6U88OPnPmk0JvP2IP
Iyz4y02Uc4kiV0/veRAGUcdGCrdKo31oNsGNAiRPF067eMy6ezwWlWeVoiNNkHLVg5mIpJQJuJVmXDZO

Uft+dFLfw+EDnHtIsut8LR9vsVvX4LrZsa1yloHPCSee0eF1f7fPA5Lmb8YHW7o2UCj0PZ0+BRSpaB74

NfQMzSYPpYrIIjIfJIL38F+2NxbdMJE9zX8jOuPXrW
DYwTdr0ctbvHr/DV+tOur8kDclRshshbrTm3XBWVUNCXTuGd5BMq56rE3yLVqxQC5jrmW7ijg/FL8ojD

xTpoZXfOBX8YjvJQyNeXtkn4g5mcwUKBUscz35QTizXr7xJtxcwgyQ3LwCF125oRdiUsODpk/DV69OlL

WmzuTKMsI0ZHi4pnVtH7N2VdzNIIxFg6sr8uEBkLt7
slFV+pewNGJ1EL64suBjM5QNtClp1/wsbtdnXBp4wRs45IMvfvYlI66AxxFuSZUJ94oLSGRmIfG8JuWY

0ajbPe+LWMyhzSEfezh2e+kszXZKAp528J4JXMXIU5Sm4JwyzzYO2Wg2wDEJqfQVUqh6FcmwaQwJ5018

icVbMLLYN79/jIw/L6R6rJEkcEl1liequFKqvvwPqV
P5A6XrHIKZPVmje5K40kCxpDXmOX1x0xxgkm1rxe+n1A0py7Z0X5CqE4erZD5CjuM0VDKnNmY6y7fqVJ

epDnmYae2W7KUNlSwWO+8540T29v0NlCGkKEYjWDlTV2buWWiAqIKQVibkbrvmmJO7JQKtJIrx+Bcq2s

tssO1Q8kTQihFPiMJYfOIHArw3d8EBY7vas3Msoc4e
qgQ4TCYOD6EmTDatPDXxyz4cwBSt34dfZH+10i/DFHuA1xqXQi5IbaVyBi9tvZnY8S900I2xfQE4wMWH

Cz2CGyoU64KqEMU36Xb/ns1lpug3A9/n19ZCz2MEIX3EOwzARa4DUsAOVEOJJWdrc3FtfbyV3Qfm8gRW

F21iWSTTPOg6zbCxJz/C5dsHSURVxNDu4G4YuJL8df
V7AK5JvQzDaQqmXsV8G6KuyRpTyos2Z4gSqwA89sak5ctjf+Jbom29zzNO8Rl8Buxx/nnEVup1MJdibj

4ae1/ScQ+qE2bsZcqu5h/Rg6E/vrXLheILpwDztGk2nh9fmYl3Ss84BqwnmuVv1qzNDIFyub3EE8qr0j

XvHh2/ETLuQyCKfKCAs6o8hnYzAYQ5k342xe/s3P2E
H99cElpj+aRlr9tWAmykeluP1fINfo/hPXlzIzTjFQTNK5ESdiDWPPVwsbfpjZv1ybU2qtNLN3Wrpu6K

gbNBz8k83BpnBlCKaa5rPd+QdDkQUf2uStJkW7uYvceZ3M3+ccXrnXIJoly4laxwf+fnLafkgHCwVNBW

xgYZNoTNsFXNiAQeSoFyq4O0LFwf3GN+BkdsUjcjq1
FXUIsq8XNRWcqblTay7bGNGCc2XR3iZE7s8xQOjJCrCa7ZhAHHFt6n0jSoI1oBVKUzzs1DORcgP8HTTL

svTuruayoeq8Gx8RC3q8HBbaFh/F0jP4KYVo9P56tKBDs/rI70tEfsMuoAjrfWkogvpTl8mAPr1SisLE

xViN7UcBwIOB8W+DuUU+Gt+OJPG5Po2oZ9RVrmyg1V
AGeR1yJHrasw+0LWwXNQQIE3VlV5wQu569osBPKJr5FL5lK3PpaoswjHWfmksxANwWGRDNBNjA/ow94t

XOJxJvDcdKoUqtdXcm+5xuzapxz7ZR+JLjorykeb7VRESCOz4KWim3t2j69O1QNdICElI/GVOXJLNZd7

rOX8xo5vLnyVyWShpCA5FpbvQLsaNhPBzh7k1Ur6G4
mzhPMULeXhn08Mv0gWkeg2VM3YJaU2MOhHbdKgvex3vHYK+T3k6W/NbMHK6TszjA3VQHAeFnxDYxrsZF

Een1ZSjyrhF2QnW1Y/5StDgKooYFe+mPatoTR7IU29Ap4GH+qxAcfJxQUe3pBPirJtSOYyRTx9nIPuQT

Y3bcfl2MKub9r4KdC+sNAqezr29n+EI4wWm+NWXKvO
GLVZ+d8LRH3n7toBFiij8MUz19FAaX+KALLpHLDlgkhZC/p7TDmhc62W5iUgO1pi2LtqdZjOEmx6E66x

WpR7jdnUrGR1FS5+iQlbbXKFZZBFBtF/9vDEsgmjC2nNaLECPHsGfiq5gPWcI7HSExOxaQMFh+zbgJTx

47q+h8yaVdedz8CqI4zi2yY+4QR8i8rWoi/yrKDFE1
qhSM8TWqV2KfLmoF/jJ2L4rx9nBmxhQ122/yW6i4VpRVDvyhWeA0fkVTo6DeVjPRC/7wISQwqZp3SX9B

NIcuk+0zkl09r/RcPNb9NqWqQFXVMzdAcka4Vz/kq6imjLbidd2G6ovq8418MoxWh5sZdOU/3ZOjG5KY

E/b7o+CEQz4JR5jFF3Aire0unM7EHwxIbFwKMIQ0ST
+QjgtQ1F5ojZCwVY6nZoZGy2ahSFq6vh5qNGzWBLFRJ6saL7VanOxR3E54V7o1vnZP6Z2r0XSWwXjZl2

FYo76rIBJewXaWLSAytmeLuFLcV1HRMovJX5n57I8Rh8nGysAaEHy379o+sd8aSH6K0EdqBjkrkuKw/v

mKaBSW59LUMmOuW/ROgRgxVmvAyyGu6g635l3B9hjT
hWvgK74yoGV3KoD+v1xy5mGdAfGXjkv44+XSkFik/v77o2cTp7wnD8HV2rBHcKgwED98FPPQUYrxY6Vu

pCEcWhZ8DcNsgo5lK8Jex99Satv3Ha3wtfYimxHFs4EWnJ2RJwg69BD6txpcafKFHYB42EJSn1Kwo9av

rGNBrTdERsAt/5PimbbX9Kxs2nZmTK5hlRffcXjHPu
gSsmMuQL/UdLfteAEgqgkos+LKruBx4eOlmlCuJr+NnoMfEaTJU0ezPagHCemdB3ub8wvvC3ft5rHaDd

Kx3KKt5Ouh/noPFrJlmlu4+ZEUzEomdIPhyArWjj01jV0Zbz2GqDaI2vRGHyF0EqhonNf0p4IQc7KYGy

hnp3E1Kf0vWAeWW+qaZk0LxmE52PBjDUaARd6A7PfM
S5+xzNxy9DmPktiZ2CcSLzb2gGoP9XGesYVvjw/LfiOxJUMaSsNAb1XjAksDEjnfNfeVMPVgHAlKncs2

gXiVFz8uiApBAvi+f5uF43rmlzz64W02aDvcqWGnzLNcmzbebZagT9u35DUOa16YtFVCm8DdB5/XesnN

I4WZuxVQKKM5h2eL4mXlJ4PvMgo9jtgs5U/WcxPbF2
FDtCrP2AQ3c8knGcL6+umec1gtSHAKGiUBZk3Iyhagf0ND5Ah9I74cl18UkNNdILUoNUl44rwj4Woh/+

tRnPY9YtxE/zVdtAOxCmTCMkX9W01JxUdVm5g2WoOY7Ne1eNHkMi4eq3bpP1Phf9Ogr9XEZpBUZvUZyf

4iHKxfl56Dcq+JZ+D9bxMW5CvooL8i4Op3RSKks8ca
6ggEikGWskUWuHYCw2I0yswiMWw4UpH1wjIDtfLMYkPJCynK8GHOsH9AsWxzec9xGb7o5n3u2YdMDrTZ

YCaS1VgXd3o7PE2N+l3wDnokaufLkXAnO5ZAl9I/bEjrDOk79X+K3x2mRn5BIcABtHsDceC15G7RMOqB

pfIqb3HQwvj1adzex0fwY61lIaaK2h8GBejRn/AKsw
lwei+2Px2SJhO56+XQZFzwdTKwBzCGqTVSdjscsUU+7tOoYvkl0UcSZaWGclZ9LXGWrEHZOIkQCEQJA6

Np/Qe1ktn7KOPD84wyrno/GORJqy+jrAesO0NRYYvnqxxSwomh4ddQmP1cJM+1v2qKXiTxK3AgGE3Uah

YphMocyXYcvzu8gvMpdLuuReu9RqHFYtCgVBDHkzOU
1K3sOyAGV8YDLfJzoPADmNtdM47V7k78tYTWoJNcJLDZyCARjA+XDIQKo6L5s4A+B3ZBBuF9SEDnEwad

0Ext47sQkuCadzRnn214EHArkA/Tf2gFkLvx5r7DG4oKkcUYP4mKor/Uz79yJCFtLTwNcFfL/PRg600X

AABxlXdE0ezw0u6OwLxYs2EQnh3v1IPQnUW12vxnyJ
If5X8KyjDV7aXhWbXsZB1g0igaa7RKpShrWbyMCx7GejQFuJrLWGDP0J085dcYbUbcZ5z4Z5lvNo281j

FxEVrKxqBL8zD8p3xmh1TWa06hLZCP60fGv71gMnDpJKRPAZcFQhYCRw4xAqSU6MmDt+f1a0jQ9m24p/

tJ896xk5glyIyyNzzQ49D0xubeCzmb7CU5XZsE3AaU
335111zbu3g6+xdMo0e5WE3QIkL+TDoRgwpjUmQPqPYF1A4oo2XLi7CD82jcfxyCEcNyoc/vwhosj+Tg

AqOUgZ+mQxdkhVBUGcxq0Jjx1u9Ad841myqxHtvLp6yJcvXojw8Ua9+ivwVvv9CX32U1J6MxT7Q5nLIV

ZscBmhge7tarwEfngYoreWSf2dULZwZwdjt6bTG3Uk
3sFTTxnNrIJHrF+MYrdPVuUcPqadqowURBQqE5FbYTp1iKus0E38Xvq3+PEByu565jGfjtZcsEeeDWAi

zs+oGJqcwyKkaWcJyu7qxSij3ejfGDiYpiO2tFy7XP6Oe2dYnKKRtBgUPJmV8iOG8Z7RP56kfg5Lmaa8

YSnks/0t31KT7RiO93Twpe7mjrIdxBuG3UCaGAzIT6
uS5mGgQPYm0DqfN3dR441suVTvLHRNJfcRgupYWZ5Ba6HD6GuocKgS1RW45lNNImbE7b+xGHhnYimAuf

bT0lHkzH2vbrzJjXmvAMdjn/aocDrmvRjCx2dajjXrgYOixQUVGlh+QdXB2m+C3ej9DLyRyKIBJsXWfn

d9MTwWY5Oz4bHz1YyQ9rFujDFLm1cuYR8FFtsAS3go
quYXgghWRYBx64oht+XOq9KvRZS1lz/Fj4KAoK++h5jhnFXoWuSpdhxeTr8VU4OMYNb/hMUqdefZcdjt

qjMIg78eW5hKp7C9k//4op2sMk5g7oSKwAQEY43r42gIboZ9lQP261Sh0vuyW8WqHsN0b/1MMPLpwk9E

wxwGO2U5sKUFGymVS3C1Ve3YstUwll0thvUD/qFa91
aXverMAec2W9gtGuVG7VHKs93G6QoKxD/POYxLYELQhP+09Rqxq+8mJA0vLyCIBAeEfe07yeL8zykSPp

/kieDA8/VZrTj3/yylonM2ODS1w5zjlljbpojFH1Yq2yx4E63omr/w1JOAI20zSds51tOmN7l3e7xVlb

O78DuSzPRQS7HgR7BnxY9/5stoFVsmyIDSkXJFKvYe
jXFft7r9Os2GUoIjTrPUTboR1fKI7KDssvhWwko1RR8dcfaRPl1gLFncU3AilA5x52evGIVH+jAU++SK

aTmG5LzuglXuM4/S9ziqTH7w1djPw77AOqQK6cQTnmYxuiDGsfiJGp+2GaEx7RWN8MtCxcWTWQjzwchM

tRkLZPxCbxL9rLZk4UjxpQDpxlvQR2dl4nvQjqgCNA
M3spJbyoVG3ZmG/MUc8E7hqMZG4NXFx6t6YeFsvCO+uYnkw77ZfK3udbEfG2+VzwACZDW9h34o19Py1J

w0SVq7AtgufzXdJ5hH+D/suKUk2KNP/A7EnM85wfBRrYVpoasYF1t4GcFOtlzG6zkmgUrFAyndXW3B4f

oHCnz/xmmUQxMdlx/rTjIA039p1GEcLXM/DofD5Nq+
k5iGcztnNBQG4ZS6ejROVvSsmg0AqI8yrAZj0H6BJXrEklYs56P/eJvGZE+03ne4ClphNqcWGsiyUr+R

iOhhPYR8+HZyyhBr218Shyke3Papf22ZNHh5mw3X3zPucxuiTTJd8Q3e5/tVuaTlEfUgzdDkG1IRNUGp

ltmYRAKfZqmZyI3i1WxzzJ0JEEmDdVVzTTNBV5S9/9
Cf/Z3bD3MiQ0yahnxACaDbHCrDp/e1Asov1FEvK8bcZuZeoR7WJpbrC8g2IfTnwouYDNqLY77L96BPxy

aS3E+vo/Fd0TstUTMEWibK6iZqGF1C5uvf5t5GVJY7loz5A12RnHonyi9v3XL3Qw4bVM6jgVz0jUiIvI

umy+Q0Z9VcBgw71j3JNilvqcmSDSVo9Dx1qRIKgbI3
t5vCTfBGxwCMurxrKYTy/+PW34vozUsNt3KnHD1imL6OFsqExhUv3fn2VZnioZ+5o43hggrGb9Vwf75F

Z/uyx5LCWEvtkBOTh2zfSqo3Zz5TvgtXfSwCscVsmPeAEwYdk6SBgYG4E45UIK0v/jfBFmyv11vWrLwv

ABWF8LtSS+6honL2WubhDJvy+0p++6iC0d/PMnh7es
RbGziq8Aa8oEl8Z4mfhObDb3wueZS8M1hxhyteidUm9fNb8/O/v9q9uaRVu4A8l9SAxF2Qeg4ZBlUnHf

2RgxFGz49DY59n/fU8ZBoIk1yBvoTycjFwnbazwLGMLRhe+ZuKz0QxpmLkF5dlK/go1iYqHqb86vVRY1

973jqVXu/qxPTbPeg97uQFjo+X+pahpk4ownMHpcaN
C+VZMiUECMtGoJr3bQpW7cBI0Hw2NYdOO2DYC1ceq5q3v6KkiEiaWoWEquRe5aND2xsin4IpBY9E5eEh

2Dzj9W2hQLjXxFsybAkt2VOX4XzaUlDDwbAiJmc82O0tn2TjUa6a+lT0Zr/8AVfx3BccSb+ta75HtkiE

Dl72jNGSQWzgZU6Pe8nILlUqFGd4hBycBO4NPG3juQ
dRkrKVxD5qU3QfoSo32sHfF4TkGcA8L2T4aK14kpcQc81r76q67ug+zuFa9Tmu0QRqhYQWY31I/fjZdZ

9PobvR0gjojK5DhHBdG1DDCCmqJNiSBEQ1KhCbM3fERLLc+BKveS3tEIILYp423WkwBYDj2ZiMu2tLIj

GA8QgsDB03KTyRnpI+SLPgOiCgETsUqQo36izCAMUI
D26cqPmiMyhJ4MJWkG4g6KFOeP1uRblmlxrw4q0cWLXkfN7MI8o7tJlToMmrTKGkxsyVdtwI0idl9mMb

pUY1RtuQFOmy0Pa4oFpA6lAN4q7/6sytGCzCwJZJb2ThCOyk5XnV4TmgftJwxbE83F3/i00NVUzpF5w5

8dNXH4uzNYixjleYYmiVc+pB5kur5aheMhWyC8RG1O
3n4hoAO/Uw73cwh+lDG369G+w9tgbhDdEFUThAwANZ35KQqZUr1KES6D/OPVQ3Wb96ypgJh7nma+DHTT

Nhq4fX2TxQy8/XmO4h0LNs3Be2jAPaMUHxpwaE/8c91zlkpe7zbKsMIex5GHhfdGVcr1XlZs50knNZ2w

kCEv5McVHKMYpNbVGXBt4RQUY2y6V/i6onD/yySey5
Ht4S+3VQ5LR/bLfIObK3WDMggfnrglvegqrTmPwoyHgymrdcymDckG0BWP3RYtOu5pXkh9p/sTRAvh1a

ec5W1GFYloHTEDynkxTDPt1cqypVn4SwGGphZ+gdt/xLe8rxFqCG/fDQNLEqQ6hi4hb0rd+vROos4j2E

2VXlzZPHunJWFPdt+vvw87zKto4rhBh9OgiQr4xl2g
oiqc0bGRmf4hD9W0SnoCaZGNz/i+xHV087ZrE3GdQE5iJp/QLDNUH6atI4dmJq8vYMjL8pOfJASDBMbe

1QDzZflXswEiv4G9c2G0K8vbzQuJh3z1eRFXTmcr+EC+H6RkvCNG16NGnU51idCZRzQi+cYC2jEHjjfV

FO9w386uu7ri3YuNQSbed/U4/tSaVU/ouK19qeOm/r
yXZAT4kooDqQM5osT4VtT6ZbodxSFc1zvw9DcE1r6oCN3p6Ed+uoeg6Y9Ei9+TvpwcFxzTssprTpo3J9

RKJ3stlOmpBSq8fw9ZbGtcF3ARo1dU98ifmlvWNlzl05mv9YYomz2b4/eh+a351pAI7fTyr428gsef+7

R7JIXYmNg7VDyl87Y3A+a+ee1aYHrWy5v5lmqv2jrb
r1XacRiBkSfBwFQ2ghSuKs8BsfW7kI3Q/nVvi2zXG1D0upDpT7Ch4rskbGd2fNhIiO30iWOnN4PlmQdI

4+Cvm180sQ+IULlMdRhJ5GpvX/iJQzjhaXc+UOAiaIpxtj/YUsvzICMAjFqlD4BTTuig8+1gEBR34iXq

L9LD0qyI3HQKLGED+oELr/fZH1ist23qc5kd+HYdoD
ZJaVwitmmB7mJPfJQC7iFRTgGBgGRsNkz0eko18kcsy9J+DT7tmsE0zU0yWIR/YdXFN+q+fbwYmFxaO1

BbN227PP89V91mKN6nngtDiQAHdf3f7ya2JzEe3WLVX/xpBO8UKTTRcmQtjqvYKNh8M1r1OB/eA2KsSV

DKA8HhAnFx2Dj61pg71SYpL2ykBpFH7haj/kYfZV/E
x68u68c2bM/TIvZ3LJeQ2antUILa5kh/FlLSf+hjtMNevAd3lzImhz1BKzHFusoqkw7Y39JYwEvZ4aON

MZUw1RyCdIntNein+n1ATqjGBVF7emSYm4FGtHgaEtFICKBFf9MMvqzVIT+ak0tX3x78UH5B7+8MU0Ql

N5ujlBq/43cMYRxLK/v2K2h8J5SyFzIIXvzdaUFlGW
grajRyuJaLlzNP3b7uI2I7Pjy/xryREe+RpG/Y1lvLZxw3n357P1+YbLZ1bnUbYXR97hde54d3ax9rF3

txXvdJDhFzDsLmr/ILQ+S4cHKAZWEDc7oYbd0DCuwaGZLI7E+ta99NH+QssHfwcGCh0bCU3hvklgA/VI

ZtDWVILRItQWlP/bUXGuf3yLHIYAXnGGGEPyGPyco1
BLAOpoEMsp+6PGW0p3LkSYRgdJTrBagsAZ+lIRm6aZTz07IYUjGHL+nAlFIjSGU7S1i/L0CJGErljFvF

ABd20t6DwJvwXYVqeLXWSxlwO5xUMlmmtUNmLLijd76+hmZCmQlO8mUU7klKfVXf/gtUNNs9m7QIGwXq

FO/ZqWUBuRWPkezXsm1x4+LXCn0kt/ccsX6JmmPGud
7y7n1U+adtlV1eM0PfobEoIH81dMkbB+8QyCpMDMiybUQlrZumf5QTLa5QoqTqnarhI0hO+cVCJXmwwn

fg9MoAJBJI6IUXDbr3IAtLTLqWtvDPMQvJhsXla6zdbBb7Nm1WyFawX2HmPlH/XpPmJ5Ghec0z4wtJSe

zrlR0Ic+78zlOTMENE2wVUu8FOzL6iovxeWq8vJwxG
Rw5n6QLBY22XVhf6wC4ETu4w5koBQFSU+p3jpV6S7XiUXkbE2ywFCLxk5LA358LMpkNG9gdfP/fEe1Eh

+k/DX+HUiEidxIyDIvy+c2u/qIJNOXCfsH09mMeWQWuy14rjgtUjzVrQuD0rYmeNct7w6R4jQk7Rq+Ei

sMLarAnjjyzXKekE9ClU4sQDkzxLJe6+u8sZHlkpEW
AFuF3+p2rPZ2qK7Jbo+1P/LusQ5TEwXd6Bnq+BYxtCbQZicXaTeTuWaTc7fgxHjEkN7nrDuPdMw8S6Mg

djFjE8hKxaTUOrcEZa8Bd5JdE5KZd6xjcvPO0C/J1ZTIJ5jI5oazTQSoVg5CUyIoT0V/E2aQ7Nq9mCG6

eCHRklsO8LuckJFRXlB6gV9nP5Va49rnINcGEPnQHC
DtO+1NCVgoPPAcuTRWuoijIaCCLig7S+alxbdkzBdnyzlUd2Nh/wwUDnhz8tLOqXL3rYtagKKMiCrWV1

sW38+Y/iqzAR5wC/cuu5jfbzf2fsWw8SPEbjuyjJxdEn5wAqV79NB8u5mFWwy+pwYjO/3XGffTx4wim4

6GLZyLMvYh3J8X62x/tWIQYvdj/+vudtB2LsVGisqn
ap05bj6WBav/6/ZstlX4qK8juAUCvEZ8gc1bfys+Njs7rpSQRbmltEeVD1H/7jsnmX/OkwW5c7ozeANh

o6h3i2mz3OBRxl9yup+m/WAhWTXYHt7AcA0v4E1+dQyytZ51S4/3zjeXFobWm1pTCyivqVMxy/XS+oou

RJ2WZWhXcch53oV3LwrfcKlLGSYghRMfG/S20cHwMZ
xADFxPsV+msFArEq0WlgXqXuCdg4M8t/PWoiujLM0UWfEKeO9riPtgfmzSLNSW6nFLLbSRssgqsaLm9i

vH8XOLdU/tXHziP4cijcXdQIlLEq8akPQosrd8qob4OBmdleXeFlTO/xLZOMP3z5tEoXo5n1nUfTQeYq

YD3qxZ8E2Q2etroUbWtdLdHSq6+y9pxrdLLuvmUn0R
pviOzuDf7tUbXV0QiJC1UfbLJ2XmDTp7WenPSDHo1GzUHnsgjKhP7RV1Qji2bWNR6+XE34/a9hIaPF+0

YiTUBHd1gfWO2o4AmWvBdKFMySgQ1JFtkJvn3NvYXGszYaWgjWbe0rN1+KHgSXNVcZJbKKWOWK+QPn18

GB07vUm5gM2J9EIDe8jAz8ysusooC4JRu6It2ID5bu
Zo7OkYc0FrbbuKmu/cFN7owTPg3EekI35P5j4fWQwedibByQdckx5pg/+TbWy3WlX+jM8Xf8Uz3Br1Vq

PfNyAjiAc8cotPfrmIWXGBMFPFdvKSiHt2XwRba1vk2CT4DOxK1Li4RBpLuSVG37qs7pRqFXZTG6ZJRD

1DTv5e7Bwao/stE95EcTpecPkGGUSD4GbzAcMtY4d0
jhWZraFSfwxhjvIq2rhaoiXI34l/+CaTnGH/iwnLF/FIW9gI1gW9fIovmfJhkv+FdCiJw2Zq8B+/7viR

Ol7xDU301RB96vbuvYhmMXJkkv+wL+OS7ybqx+JWCly4KdLGyem8I/VAa5YwRMBZgLmHzPVXRiZsQTHq

bxhkb8dWUOPfXsCARO+uv8El+5+GxXE+7rTbAVvvpS
+HwoF3A1xI2qQt61gTcp0gzK9CBy8YeYHQHtM2kiBKzpgPp+7be0d9Mk2Fh7mXkxZZFl1bBUQw+xWPRL

AoIKkqs6TBbtzhJ8oT48LD2eiT/5YAqCqfRp4D6Fjhk/ZwRoFKBEj2PWNSW7XSyzyAW/N3RqlRIIO9CJ

AzzUDPGK6oqIgnLKP+NUoNVFyNxPokMWFao0eZZFP3
qUsomMieB1ItjRhG49R4k2Tp5cM0Wuy2J6yKmqLRV7o/pAP/gP8XI87eTTkGn6nd8YaJms3uXptwE+2W

0wVGJUeJOdNg054Zftf+hZ0o7J5pyXNQWIBfSqHRZuXi/adXpoNiky/LxjwGN2OE+JZE9KMgaxYTyW2h

fkXXfjzyhlmahfYxN0ER5rgsFvvGs9/9yJB3kySPqe
wa/Wo2TsBU7ZnYPdJ9jMTjSG9MWTSsWLFeF4lrlRoJtJe8SWnwOT441rDmz/QM9CAbni06viWsizFJ7S

JPJ9d9nG94onQ9ouzdYqFQPwawzesm1Nuz8R0D4gTzs5+jvaprOlnL3hdKA2P85oiJI/SqTd3czNH28/

JPrxDI15IL5ChVr2VSmzAMq2dubL56mnQA9OEc9lCa
LuXsLssIbBhRz/XJFW5jpqHT4ZCednqq+xqIWydEbHiYg1EpEzXw/mC4EPoLXIMesDdlUR3EfCfp/uV+

4jUtl5m3jHkrXJmaepCBhc9DNKNYZLh5/Lm9XORpSJviRHA1ck9QjlxYjBqlsMj+EDL2mcP0GoyluRPY

IB3QNSN9rLt9oMLPv0ren0Gnss32xKsUZQes6exwCI
Rbmn1BjaSw6ZmHz29QnfFl+6ONz2Pz0Ze0Xi7Rf688qu7pD1f6wC/q46btuoSoAx9fKQ0h0CqRaryuyJ

K0ih3uBRKkySd4r4Q3I/Jrmshrk1vMuINa/WptPg6zvT7T/20wX3Rq6k2x8RKzSsr/8n+y88RWsRZjwe

uW2TD3MHiHPgxvKZ5paCTZAuYdECk5gllEGVuvz1mC
EFyDV+FehVaAQHPuua/63bi598z/bvcYb/T5MX+yESyH1Pw+3/wG6gazkBPL7TObqpLrOwDZGkLRvz+m

3WkzrYlAOJ5bKoWmhV8skP9QJ8vQy9B5OxzEzIpQ0P2EsqGZH8ZhLotvUmtirOgF1uaF5L1gWBINbDus

l0O+QjL7lr+MU7j1VxhRDDUUtKr63eHvvDAtWSgEbB
7T0ZOOmKdEN9iQQd7KAfrQVwl3jCJi2SCQxf3MP+05JCN8oSJ2J/qU+7R/UWkSekw9S/oDl+vtS0kA9V

EwhRRl1/7SR3S0stnmqpKEQ33NaNyzEtVNx6SWl+Atkdf8tZCi9bV8YOfD26FbD5yNploFbAddunaP6A

kNezEStGpj/ySZClDcAuo+4ZXoCteYr9JmsUe78zi/
m+isiWmC7yAbhwSJYwIMwKG2Z1F1WE1PJNk3F5vFVSMt145f+NGyjO3v/yPe42wdlrG20aWEomAHspX0

gEmNybDA+oeh5Rli7zAfA0k5ZoEVW6Mb/W0/aPmmHDHfwWCt65rX7mZDJc6ZXa0tW+RrWrMNwTj8skV/

ZswnNAttoFMZeD7Lw/oZ6Y7F1mU/9/wUhKZvBsnff7
NVyCeJaD84Hf5m9PkDsLzajrmulizHhJuN8LHbulZCaoab0EILd5vyCFerFWjkrFhGWB+Sf2LV3Vw9z/

oi2jrlfMhZkIN0cBHOFJy+uwDhJTBq5Qsmc5r18mcQ2YTAdGmEEsoS46v728fHj1vtxDvLz2X8XzjlHU

bneWw1JsvYwIMp8cine7WGxb9rYkHyZALxW9kKC00j
Nfs49osy5FbO/+hagXEkQw6xJqIG6ZvNY/DexlogDiPYmWcmixckfaqAuRC7GTlAFgoolWo7diNkZ0Uv

cZnNlIk+z89hSUE+sHzMwGM0JPpHKcblVqLJ6LA768TvE5hAA4T2Vpmi90+DP5VAftDptKNNqlJDqcfh

EEtCCvwi5f7c80ItNDvI1sB+WMLFD8GKXRSAY6DrHP
ugxdjnw13VMY5KjEnfNn0NWs5APO+HJccUYfWDR5A+1Vxl2QE5/az9QyO7NsiPHsdv1/KIJnr2vQt3BG

AXyYTIDS7dvNgHHF/nk7gJw5+UgRZxognh38cLbWwV6zKwSBHL9AJLdt0l8wRW8dj0p+5VkmzrqSdgRM

M0iA6r/22glAl0yMTzwXtu2dcz+lSYE4v0jmqy83ob
ABdOiknbq1SATKfTjWuncExoMsUin6AgV9n+yDQT2I0PkdeKlbzK3/+qvuNqlO4Jb97gD1hxxXD1wI8F

ib8ojtMWufELweNdJiZHwwcpjG1Q8SmA45G8xDBfafJWc6+p8W/kOdrUcLgwz6mcykLRA0GEVJ4oKYuP

+uKDp5bbD0ciF3GR3tBzGefhX133HWKHh5Umkk6pts
uEyGiZYVkxyMHAULtGn53L3mVUQP4R6S7PCWsVN7OZ/77GsYAKWfsa/j19oEK8PI6wbaLhHWXW9fw8Wl

zDNh+92+9dvuNx+snD7FiH+0LyvY6DcPOURcvcXbn9eIiqLNNs981EUWpWa+XhAh41tViR8T9sHygfJp

OkRftIwgWhYngOA1fVizIOqJxWjzYSOL0wttFMvUY6
gqpZNMNDLC6hQzHcdhcTkqRmcrLicg4sn1324t2yOwai3Xw/zFlVmbBzUE8UeirhfXHf3MqQ4KRhqIyW

gzTXiDj1p7+VDX0K2u6kPu1SaIwqptXqtuPecKQ4aq16ZtcG2us0kWTRqXY8DlK7B4yI8og3lllDBfYh

B05KeokM7DJp/ojKg15Zq3G6fCJhUw5hxIVdSEmwVX
j8RDMlqIw4C3eHK4CWOCCKaplMK+4qiihlf2GOKbteTQphZ+dG2f/O/CcSnMZ2emUfK7kFAafSDGjECQ

8Kjn1jsRdklkJpx+HOU6CbI1katHMb2Etiidt6MseKe9+YsQ8ZW25Q6xP2+NwMzP+WK9/Y+wft0CZB3T

BzZ8/V4kb03WURPcshOQaI0mszAdL60kWZ+8ZhNond
NptXKBw/CudAy2TGBa6BD7znMg25sIZlXP3XjK/SrwFB/CSFO99G9GS9r0+LfKSM0K2Eb6KyIZ4yRhPA

7XpmalIiTIRb0GP4M296XRV92x7ke7JLv84O2y+j9mlr1z+8Qh23PW9IqWDxsuWDMaDEzbI9ZAAJvQK7

zGsrh2/ds00sH4BH4EtjGyOQMaepZTuBLocOHZvtSr
sxsHxMZqC8Zvj/uGDWvYpqrbPx61MIGMfWcBydO4CshjNJxY4IPvz99q8F4k0b4bFgGYbCDd8qUm/zE4

tQ4ouRd34C0Gw3f02BM+pe9f5M/ai15TvY9+ptJXI8bsiO0Sib9xwSsG3/3HWXyeylt5e5Kzzg9rPYHu

53l73ErUe6th33+i49jyOM1+WGTQrQ2hDRxF50SMQz
d+pNymHDmNhbE6mkM54cD268UHnWb3h+hzaXTA162cpvEEmijBUogv0hSyuV+14w4hAcl8glqMIVcRZB

iRmt/xuOC/ljDNzuTpNKvq4i3Toao6RMax6W+cTyjDC8EWPTOKerIIq2ZoAP7/9Qo+AKfCUeme20HPh7

5TeuueOs+Sii33ineD3Fj9YDmJwHxQtvHs6u/r2+Yf
OmOsJvmTkDkq+gHdIKwG/Fc1a6zvu99yMPokYE+Ws5VJwFOt7ilOJcaZ7yyrarlqcpKYjFvz71Fl0c2S

vW+SUl5rNjWTg5amoFLJhWhABvarQBfhlrdu3zD2SgX6Odn/hMRVurZaOoB43V6p5tEERAemEWi6fpVb

zvsK9JLY2MpIsKiaIezCu3Wtdqfwpe42gUB8GHHrKh
J3ybJHeWZz5Ju+Wpn+vs98qiwycQF2b3b+EKzXv+6Ch4McOBHbptnGGsXdYLI7Y0HFm37jQlzTsx4PbQ

iUE+5BQVPZLyC0i8FOwOQPAs89Mhk7rx7yxbh0u7zHt1q0p3Qcr4YvvkzmW/8RombPA5l3+LRwt6mkdp

ejmB4EGh1r+hzWvfaXAii47HfILmF6Oe/MNWBhPdE4
t+bqGsbiRInKHsQLhb8x5+m9osp42g7WiCs7ArP2ZU7PO9m8K+jDjQqTAeCC8yFq9iNbj6CyS951CPDv

19g/YsnOEVHa+ZJ5r7TsI17DgxDwzdINAnco7L1lQ0HY/HvBjTSiLjmjCk+4itheD3q+OWRj/Nh+gRkF

ECz5hqA85RZ2ribR1u/CSBKDDa3bM4JW2Sko2eRhvO
9Mmh6ClTLBOjoqes1PQYaUc7NJnu/eN2ruHuKGtruoIQalVoAqvKmUVtk3aoG6cghv6St+2kjLe+RVFh

pvrxq8EGhJ8cPgh2EdUE3lMYlr8XznKd/Z8xQz+dUg2CoDiU/rm56Bvx9Qu6kcCFPb+Fn877rqR4y++E

mHv3k7MPQYq8AC871oxwLwCXB0lce+5rIPh3mucb2G
A2jj40ramEFfTWa4g7jz+By+84/Cd/oOmXJ318XYic+30KYF+8CyYGxr9CPuYnEU4EStx4AvyObJyK2U

Pi9V3SNJtlcO6wEwBDW6aiESlBCwzCBxhJmp2zom9//mRekPqmdO/eUelEwJTL5yE7YGKiIvz12tP4Mn

l07WUqEWjOVLpzPVRJB1IrjKOaFrqXYBnoSdmT1BcX
ORgH/fOnE/D0sfc487+z4Y6z5cYEx6YFIOfkTSAI7wP/TiutexmfCv8VtQVJYHkW/cZw1m/+oi6BCy16

bDf7ktho+Imu/j0M13/vLeDN/qhPE9Yo0hkT8+TopSJbKkLy2MmwCXoclnnT5+LW0FFjVKUnTvHI8lGc

4tHYeW5SZuJ5t+GuInsqP98sxPT9D4U/5wL9s/JA7x
ZFy61z6w/4Dq7Y417+//L7l9+//M3x8onqsBNH3Z2nASZPzyaMHpK3FxnXK0Dh5ME7fVjUXC5APJtJap

koUCYn2g4Jqdt6HJrC+VjtLTQST73dhMY1arkCs/iXTr0XbgLcOIX27yIzTWGx0hi9jzR3kIeUPR9HGm

BckvdPHDxWOGA8Frx7o1F7MU1/PRmAUnK764imSfAw
DkRKUAfEpWUCd9aLhCBXqXPC1Yq+Pi+K738PWQNVh6eV+o3d7N+qBVAPPCgDZQl4kaRpq+qw+aUTmYUn

9qtcJWcjJCTysNTj8FjpJtrKx/fQO4qDy3oE6EnEl0zLijtiygKduazhI4euRquDlonFe3pdsLMG3v8O

NWLmdvfkDZqxt36fTxnUFSgvWZEzA3x7xf0ooLlIIn
at97woz9n4XMsd/JrnOKgwgIZjcSYpCuAyoz7z6ifvyueQOyfjZ+vB648HFwl7Ktn205PYKMyqnaQQE3

WCyCkv3zCkp4o6zaxaP6J5fKfLJNYg5UHDRCxlNHDmpPd01EIfU1FxetQG1AHUMO3VHtYf43ACAg+WRl

tgwA8UEupuetSlsxmr5pGdjv9FEVYtl3lwYzSMPL0b
Io9VYRGBdfLJcv+2hvoGrPppm52f6bjOV+zJlYQdGuDKvcY9OG61EZBOvUugV6oCwsrslcAbTAfYrwQR

Mus1i8D8thGZ+jtfEEX3lS05QiQaRPYvdBdvPNfI/3xt1pbffZF6OuqdcgPDrkmcZqqiSG7ms/ypflp1

hpfut9X0H09HAzyLGljEOb6hsybis2H7uXyhFPTRhO
Hoyr1ffE7FHzimanGmnuu9BT4ZDVNMP33+ck7/+3OWCUuroGzI7+bEzXl0R4H8dnCqVVb2qJyIPQ6Z45

6JTT3F/f9uVx7DSScV56WwQ2qsKrCM/aoLXuadBMr0S0SxmdnpRhDCK/AKEfeeKYP4ryKANlart3v2LI

/YTdWfyX2oybgsomz/XD/aK48CpR338rZUK3z+1qeD
ywhZumEpITQ7H9lmlACMxZFqIeop+gl/Nt2/beAYr1ePTCRoeapf5fpqz5uYz70xN5tgdndebvFgbfVb

9ejV60K5aAUwsMpO6lcHACxMZTRwBIq7BcWjmiyvj6mL9UvGSps/vEoaVXmxq6gbI8NXR4wVzp4QFdXq

r3psTtSvTk0hSwqqsEsOrnOJq978FEWSIeKT27vWT8
pY7hMv/Grl+cXW/ZNZf0gjRETqC0APe8+yW6qbNllYLuBVJmeDOwuT0sTHCMFib7qDavqUkxQ+sqP1PU

X62zUfbLGlKaM/BsH+Xrf20XaS1MOdIRhnLXo2/g17GmRiBN7hsL7naP86Z0KRWxjQesr9e5oDIlZpDj

e5hFDdLmzGW9N6CkLwK29CpUkzJjERMebICgvOLxoh
ZtwMKyvGmmCJjm+tlpL/JNM40bKZkD/ZbrMXSi1BzN8pxlgByr7oCBdAyuWtT+sOWfEbxIxCPZyW6l/V

RQgCzVdHSyh3W209H2faHH/VkdhgFlpt64eiqBA3/AfjT8FPxFHeYZjd3uCZDIzJ8H7qAq03qOlfvbgP

DRnVkYds5NMlXHMFnsSTDSw/iJ6Cjjw7atMge7P2SV
PI+T34JATd6rm98diurK9qNsMl0zKNYUyPczDGgsnskMBIip+H7WHvzLTUYzXpJhoL/944k55u8WP+bu

vd8b3qla0aqu1RBdryASuYIPHmMUmDx8lDeYiU0XgzdnkmVaG1pMAM8rp9x9ByVxU83HGhqUDCVL6Y5Y

1Cu50ZQYKYVMJvL58u5Jo/0zZz6HRrHjcLHZJDpNXX
7C8y/RZDQZT9qBAj/lu8y90Uttf/YFayBD1UeqQ9t3dIkZFF+qkwOUtY4Pb9ERk1J5NC3qIpC3aKYagI

NCM77H3UT57h7J4s07xFk5g7nGzYN5/ye+X7AhBp1bHTQPdZ1oyPholYvqO/BoYhS3e85L5eDKxIIIMx

Rr9MksD2q/iXgi5bjBe9Ba6GJc61RWWcgaqrMrOwZk
54dWESpIKjtAEh00xCXgkJv/Brandon/H3SrIAbCMZdE6T+clOdUvEvBzgwznVgSYSPJW9mggVn6RzpUt8Wv

sPQDJ6XcqhvMGfxa0fsv/ZqT/IyuL0ttLWwSziZ9hOoKfYWCge0Rr2B/2hXc0aZIj6gE2FzhnEotFCQW

CnecIaPWJXS6e9usAdVzGDi3FT62cjRU5BcSAqtTon
+sO6kzXrs1U8fdmtgEWcW2EymIyyYAAZa64R6G6iT1hfsqSIaP14/VDF48O9FTZOv9zauy86nqCbh7bk

G/0qQYhZkVUG1Ikuoub/hMmwyja/4mDwnlyWeS+u3B1YiSr0T9FuImMUYr1NiCGjd131TlCPxxlFqbBh

rq4wqYcHhOs82yzZP3nmYANzV7CoPmVtD2p890cKJg
MFJK3ixTxbZasB09BOTy2DZ0ySnLVnA9v09uh5gDahStuH/zoNWFOcVAHZxcJDHkkC8zQVhBO3rwiP5u

HGQtKVHd5g74yvtIZaCGftv+hicKokrSFKb0MfA1pvv4ogTyMZbxhy8znAwnrAwHpGXkwyJ2oVbN4Rmx

THaGOlKNsrzjAs0N9oNaLxM83PxOVGAS3U78GmIFPB
qBPzY14RdObNIMqNBp05jN/UzIvci6zqfxus6Er8wa18WEKnl/3CXKVvIkaUjg/xD97NUyWNs/rww5t+

PmMPH1XowiH4NA6fFH2Sidx+D5TA95r1TDvdQIPSuQlS8v1qL/zWlCWPs2tQ063EWahTjqReE8B6pFlC

Vzx6gLoEgRZGHaZDvxbD2sD/FylBIRxQSS5l61u1DU
tEiRDmbCxvAVl9lD62GvDhix/IM80VbHb/Jjd0rT9HE7HJ22EslGUqej2+Ar6U+xNyUOF9hFwkjXTcZc

9H7Oj8CPPeOunZeIG0aw5oO6jmuM2ESYsIc2Pa2Pv/qXW6nCemUpntxfhMbD7Mwv8oIGz2UEKk7MuRBV

dmVi/auSng2V+q4fpK07XrX8EVe8u+1Lj8OwixdAke
oPQlUk4e8koxUsE36bamgLaysBVRegLcxQmmVw8EdlF5JT3k1sJOx7b1rzS+3Z1hacy8ySiWfTVWd8f2

2a8Q+UeIFbsygtr4W3JIdLjb0BsxFZWAY/KeXgbfb00AxNYyOQ86X5BHK0iYsAfAspcezaX/m73EJ8ai

xbyc5P+tuW5fNyzMN4O8I4oheFpu6hNgApvDw/pVL4
E+JGAsl6QpDCMN/zgxs82JTw9gsey69bqDA1HSEjXuHyBXhOypP2IE4F8LdXx6A5yhJ7GsoTzqwTaxTS

GPH3/aWu4RCgs1QHBj+T34UEFHmyWu0sCE6TDV703GTXD9SvemZtJgbV/WOB2eu7Mo2ZTyf37Pkv3gEm

8vOqxLTxc3CU4P+eLo0rLQiBbdrb6AyxJYCpuwc53S
lxWDL+sqw5SOgAOPykBBVP/y1J4bCGgsGE8m4dYhxGdO6DJsd07Dm2PxhL5KopT2v5oDEiGM8XGeqll0

EVqRChjQ5WnqLMIcK03JWDnbd+lno1h1Xxi94yy6QdYDXSADIwswMEfAoLmIraOVxI+aEp1rvaPc2x0L

Dl6xspzrAdXuqo73+XNQztlgVGolZVdcZz4Hj8Tb1W
Nkn1OCQi3m8XTrfm/51ar7gfVyyylKt0oD97qxAKUAGksmvbg8V6+4/ND71swOvqtvYGmwq6lxi8dOxu

6Q4r/z4FG9W84s3YQTW5zT3CvPeqgugM27ub+ZAE1/6AHUlM5RkVoDGIvpH3WJXsayGOl9ntITfbBPCe

rlSrCxrhh17v0IaErKLsO6GSiPnNVgdmadqalup2so
G0CKBMf1ntHhIlTkJxD40tkG91Ofsuz7Ao0QzbqZzNM4bsRRYBzKZVo88h7P56WUlvLBch6aSUet2f8a

wPLMJ9GnDSMkScsIP0g90+uK+K4x0sF1Llr1/EBNUxzcAnTaSBQ0dZR8gjVFRFcGYQOD4c8TWPOcIjwQ

g58bGOzEIKfb+iBl9BejilG95YQCw15c2H/YpGkbHd
x2hq4N+Xg74oa04JtFy5FmKT91TrGnYsCAMZm3Y93akCzXjE4s9C3V82g/MNI584IVhAs5HRSUh56GQf

hAttGQiVv6FOKbzPZr1PyjNHVlbBCCSH8i7riEqv2WqfEKoNO4D4DDjjuU5vS7DMSn7fVGllK7wnxmta

EfOgzNGowCh2XKmFo1diTkoGc1OVoywmvumBOws3r/
+lNGssrLVOU+qvtan96pb6/3gbAfOxcMB74JUb9GQ+eA6VlnhyrGmX31AK70UHRSwpcSiIlhBPxGtbep

bw21Re4P/kVW4ar8VRvpwJKM/4lS1CRME3Q9DtvC1G1acCxi8Q+yS3GvD+xcfrI6kJa2uWxPJ2SSlmql

a77j1Lj/FseP3ElrelLBm3MJ+LPqs7Y/7zl9PZl/5d
WtILcmQ5wHd3iVLIpWfIScbgHO9fW5WeTT7nFpry5I4KKJhStmnIrPbmNZh9UyvdobF7M8R1MvTC9lPA

dV15l8cs4D18X8eEHeaRH+FwB9NTU7nMCeySVuQyqJkHIzt5hpzzm4fMeWgqdhgBmLUymZPPg1lKcPQJ

2IDW1fF8+lQ60r6oKUZW6TLBtg6ruCQxLQv0QJPefr
g72lDmhLPuUzNhqoU1/BuTZmkdGqI0Uq4GRc0NpFOz4rLXPJhoon7Adz+m97u+17b7DZ4ZDSyQsZx7Or

uOEZMEYHU34wlG2zQpDVWzTdruTtpDlj23Kg9ZhLg51St9q7sStTZlKeRbHlAxqzX20YCvp0CvJvSvXq

Txhr4+2LUkmiFlAQ3f8Jiv017yOMYV4D1dE+3JAfau
2NYHUPR38JjY2saaqthfO7opbzjd2RwczJ7NM5cRpfufaMky2JAbzCPRASdDXqfTLxb1RWHinodk8/Fa

c7R7tDvcpU5Ft88QmS+10wBkSPeXZqN1kSqZ4sqiS1IvmDwmpwJ9uFkvBvNVoVROU1GPdpVjwe4VsMt9

0omJnKSJtYBBCGv8hNPrINlf2MxfRVlbcSB5ttUsHK
QaE59W5AvR5KB3S+GFGuatWia694QdkRz1KT2aEtiREg1EPltavfFvDVEMbU2y5dQBx60L10BoHsqkRS

oPPUvjA/PfeGuK4vRQ86G8XuHO8Zo7g6Rmho3tV5ncB2CpxDkHFiqEryl63HL0oXDTu8d5dB0X0xDDz/

mxPMMUyfSqJmx63c7oUVRURzMZbCTJqN1BdH4XL6t6
jLoNXcmiHyqVZ+dZE4+kcmXSpYBliwV4cBFx1mBZvcui2tYWi8vi0t2qoqFEYq40NHewiQRdOT8TXMqg

PmRvm0k9n0HI1EVUcm46DKe3Sg9NY/6hdjNEGQhmOkLUsyU7inGq1t7nE2PeSQXqxB9lhHcK/uINsoUU

pZp0HBA8oeAHzXs0qgujtSX1KZQBCX4T34m0AWz0r9
06lxqbUvSFXg5oIrMHDvK5qMscs54E2ewpGL4ADE/mfX5zBoileH9f9l3EnHobQGUvsyQ04Z8tmF+CPA

vBf6B17kmIEk20UsZ45tCQdqzgpfNqnoAJ/u5nGUoVkYsvIfD8Y3mySFlaRu6jkkgxCl2pety+WaJs6M

l+03zl6GDCP3ykDgW9ZDWw3uY3ZaH8sNhf1a1WBJ2D
CTUWLwNSMoyfjCNqr6EiTzIBqjOikau5+x2jEPjZRMT3Xi0PNf5Ek50TNhsN9Y+R1zc3WO8Bbxbetod6

yht9qpa0GMeR/NsvNrCYB31EbHU3XPkw2gb+rga9wGJCFoyqmqdUzQGF0rSsdxADPv1nuOk9R6TWu6zj

Y2jyPEDP+FdihZdoa2vKOfR4G2TKO46j+nM7O7qZ6M
CireUxBT81k1mDLtUyEtjb1raH0RDXejR3/Wm5SeY1BbIa2nEIcKKYOR8rj/DBNsMMALiQ0N+JsC789X

6T1MHDN0ieO5Ul/jjXyuGO0/wJ7XdY3blb3V6B8P94pluyVGc7jkCr3zyX/mMvu7fof7luErmk1fRvxI

0563rdhK+0upXsIGpIjdTd3TDg5a6Pa+5MfbtsAZeh
Rg0vMSLE+HKWMv7N6mijDwtQJzgJR1OhGtYnOVf6kTBiAtQA0nwTvDvalpeC6TjLWykR87mwqwUeFPhk

Df/bq4xpKzGEwcIrza9UtB0nA9kJpGCYNPWdMW//ZiQrCn3jvirsFR7Mxg5BE7VckpmdVKXbuKyKFZal

//cBnB/9RDUyywZXSWrp7xEn2nT5zbwNYXIAqm4pD1
aV5DCGTayJQaWZhogsvKR8GzSSfq2knAW3YrGrg26eIc6rLSjRMVYhOOCnWG8dFGM8k8u5zVuEduYnoP

uHBjDiw4zmfShYY4UjgsP/jLv4+QGaQzdTGFc/E4DK7RAQtui65zcNa+u1PbuK1bwJup+yMvf1cJwVPa

cLVlA1uFHHNb3Za93fZFD2VE8By+QEoWn2M27gOfwQ
ToT05/4U5QPTsU/ge24A+bL31JbE105hpNqugjKA5z8A3JZu1tr7TV7Z1DPXtdUVx21SA8ejWGiIEX3X

iVPtlAV2u/Xl9/wLD7pqngBb80eB2LCGvRJV2u/la9nAWnw634A7LfTVPbPPzzuNYOfU1+QtNxV6jeww

2gnHtgGjeWkPSSODL2Ifp5n0AUgP2geixoMXrdJ9Ol
WsfQE+vjq/ANgqFlK/tWxl4jIs88ajOilpV0VTJoyu99PghPoBD+R50/Qqe2Sq8fPL8HXnXTNxMwKGsK

xRWfJ97XS55fjsElrorBAdCKbCOygwUTpS1jaOSTbf5BZCjb0HPVCae5mirBObDb76SxFxqEj+xZkYlr

26hC053ByfNmASUbu5zdEc2n6EWeB7PoTarbgVzQZo
dW3LlfZgW3iiOqiwAC3R6Ct86BEXNbAzv3seEaUdtY37v46nrZL9tlRv2vE3PUnwN9odWLCRZaPZrH7Q

+cCA7+PtvBjOmL8zcO63nin9xidpc3Mq0FT3UwhCt7zrAdIkwCqEzSwSUVsR4gszPMj2LIoQGMPBID++

oyw7hDEY4gt5m4x+V94v9Z3YDYZ+/sXYWkwJ6nNAEi
omb7q+w/8FGDOdMQIRPXzv6UEutuY+T2WQa/MFBvLaq3fEBEslZwpM3DSXIUOrxQCSp0bVkbmSF4WEVi

hLZU5RrB7VsWJ3HsKvDqcGJ2HsRsMwr9K7bi0aXg6ioKwk8BPLL41NuYB9VYkjX9GelrYzv5vHRSMCT3

/NKRfnQRKGvzrh8EhySo3zYKNp4WvYcp1Hus+w9ylQ
BLcShkasol6bX1BJtQWzo6/npnVr7lPeYclOHeMBY0XRI8l+/cba9Kz3BKi3osj62+W7XL+g/4WVA+/C

gisCL2Nhy/2XYXV/n6le6JoujGmXnsx6ICCBk6Yi0etB0H2gFo+QuNN4pQUwoSWgzh+kl9QJRejYj6d7

5YR6e5afO1ZqMvctk5mweCuLzKKC1cdg46/nPCBwaV
S+lMndE14c3yEFNcK8iUl9qKm4P3ibXVER2TPbhZCzk1JpDNIKWnUGfs23FAKMUpFrd24Dp74Etzs0BK

tMsDzCHe/ql6RpKob0uCa4ewomUaioqktZcoF4Uy+dDdAKSGfgOkacYvaB6yy7HlAtHFcNnIh3dHJaJ3

lOI5vWyRvymgj0saxaIXjdkmsOcKDCKCPJqOVZcRsz
E8BECD8T9NKhJEOiRdzgKz+PhcPZlcj2r/Xvfu78Wh5GEaSGNVoC0/W82vRL3Njyk57TfF0/13txIfLa

grgsP6+NhL1S2fobtJl1eZkqP/XuozM9ITJXN3Dy7sWcVm4uTW27j8hi6Y3Ptd8M0Gt/7dQSZBW3gv1S

Rr4BEA/nRhwJX2+6dE3GhNlQNa1iv8gjJlXj7dCivv
vPM1R5vBvjKCmyd7XfAR0Q8ipqZ/4EzR4VYwDS/yV0feFwyyG4P3eXI1jPDm+Hi/TSACxilLsYTo3ior

ceQ/boD6YAQk/3XkSxH02YZvkMzV5nuLGa4DdE/lNe4vJQ8pgNiYnDFL9t/wSy/3Cc9vD8rCnQ5AIucx

VBue/DwIIgsl0/iV6dQY+MKwsK9g9ATMdDIoHLrxee
Qt8BArcsOMQf00ZCo0mCwkKbz2Sh4k0zjBvk6DFduL3ta4AoU1X5Q/b0QIh7+VwjlnSbr9rnx0s0w7Dy

28UFOgcSaNsL2VkjGScJ/LZLrpbC5NkS5fwxAPqj0QHz85wMHOfsn5JHc4xwndL47W7T0b7sLvRRT6ol

+vRL+2XVDAG+LyBz04V2ee6RImjIsDwg4il8aaDpar
iV6Me9DjREqJ1QT8Rv0lZ55t+kOjSiN1R5X4WDSUMGDE/JRFXjoKuvrtcORMVUbVcZGTcgynE0dA4jNo

tvY3EfGzQ1yjPyT6xM6wXMdUaxJcXSedMOxljJKRgIySu35BOjCUoFuCDCDV7O2sH2ujdWU3aEeLgUhl

mqLWWy9tV8I4KFif+HY0IfyCpwtqKDysIIiGBRn7Vx
iaTmQSHmcdR/+adAeoUSLYKE+bEXZ8P8LZUWtNcGQZcityOFBptonSO5VybS0+iqKohZ2XnnB5zDejdm

gVKq9fULMKOQnJ1a7aAD9PERd+g6sA+dyhDKdnICanS8Cu8yoocI4JmZG9V4zxEcVkzye32qaWITfIcZ

OT2g4ufEUU5FyPmZddo24XugvyMewh/katia+5h2bbPG
YddKYxk3pLI5B1OlCA4531agyDH2/pKiTCDb+/GsAOxuTogyvd7RvOQDKpmlo1u8j/EJu1vUCn8msU9h

tsql9YcXjYdK8TAlnUAgpeixSKKghNWlglk8EbNuFbSwEiLcHbXRF9h45rdQotQrWgX/45JACD344+cS

+I24M9osktUYgAmCfnQcTKxgqjzAhEhxO5zEzW2URH
LK6z4b3zWTckfFP77H3Uw2OWT/Jl4QDC+3i71G+RiQ3L2CCwWics5teV3y2zOlxezskMduKMV7sJskQf

x607meEHM9k+Ebie8nwGn5nf9DMLqwfDI1kwaMhSWSbTyy4V4jTXrO8XbfrJ3dwpONhsZCJHqreC/eLG

2AwliBg15yO9rFqGKSUfXC78P3FIz+M+1fBD5QwU7j
zXYU+6z/F7mGxUOttnnR+/WWlQMdRhYV87HtQeDLojNvG4nVd4xKiFFU5DH4zGz0WjhvN0aI8pdTinzq

8UTlAM/BR0B/36EkBNmOaIFUkwB40hy7TTnsZsZBy46trlGNCcOpST8ZZ66fxOHft8gl//RgEl3ciQlx

6eTYYsw/S64iRJ0P12yL6Uo/JYb+g6kIDEql2eNvHx
s40lct81cFzvT5wDHrgEgkFCg4UF6y+dOqjV/d/9fIn+SA/pMer83HmlqhCAh9sw1LzDtj+hD3zBI1C1

KWbENL/u6RNQOp1e9zoLI+JJjb7cL9UILwqfYiyNgBvf+hrpJOR4+JwWSCgotWTMFBO479isIvVLMlSL

z8rUqtr7QOvNR+7JcuOP+uCoWF8URz/8dtq1OTP0xM
zILxw3jYG7/qMhJuLVeczbcR7l9WVWnP1xhPEQ+6duQ1eRoMcVFhava70hYuspi+96ll8boFmQjjqMF/

YmwIajMSN6OSkRO5zWcTWu5aQiHIhAjtBayAxpYv5zhXf1fmMj1xLxgq07uyj+HPODJL0Xs18UcOBEk1

OWohbScrkYP9o0qCoKD5lgrYQkUDQMET1LecDj9s5Y
d85JjTiydGM/rOF8YR157c12QIAQfR/389ZaAsI+tW98cFjl2K2tliS+FpWbY/2vY/ur46Iel3dru6Kd

CSzrRS1/yzIIog2IpI/wno7RT5uXLDKa2MIle1dInoQPGNQnmsNQHe/fzoNuLgoxWiph9R2SD6kdp7s4

Icv+oaE1ZNlVgm9kHhJ76GNxw1/8ke7AzrsM4qrOe0
P1RulBNAb5deDSrZdwDHJcIWxUVmR9Xyj7BL8+f3nwrZIoAKFIltv+YTKyyknhfk8wIGPF6jytgqIBxL

o9HxtfufjAq8zwrzYxtHOrZGXwo93YJsFQFRqTSoyPFpaa59rRTScSQTffQBEhi/epNR+jfXX395eFzl

fVMTTLS2bSHMcK+qI7Hw/AUx1DZYhRxpElB3plC1Ls
qfH6xVfwtCDle0tID1E76yfcIJcUi9i8ZKnKzsQgbESLN0E4N/hSaXt2T4obA07v8lPef5yVoYkGiUhd

0rPF85mFc1xazns2HO//U15T3gBNUwEESe8d5yVz/8jvUaeI0yRKe+H998xAiI1ZZjxS3MhzrOExndl3

eSrD3F7SbrZzcD3aMaP06bi7UzP2QpPY/6zXVEb/C6
SfS88PozrWOnxDhrZAZqyTiE2IrDg+nF7iMFg+eRzaaqpeEYNVJIvJnI3Jx4Ga5hTW99/xt/woky+sVn

xJ+68uMvi0NXEMbIVeclfUQR96fed7rJN34e5jmMUnFqhgJWW8DJRWnRvV32NvmN/0U0tr7XJTxfNIRT

pwqiLM4am2p0FA3AHS9+x5agy1J+bzRrJ/CDLo0hHT
8KhV1uCPAY0ozLUIs4i7Er8GCY/SeVGqpYqhLKFo7g1qDi4SnMr+UWNSNbzMFAqbxNDfK1/pKRl67cF0

rbmm+f4++fdSqYvHaGRlcCc5JArbWLXVctqjStUz9RUvIZ+ofL5K96I44T26r64n426b8zN4nEr6JtuS

zWLBQzoDJyINqkd/XkUAcppWvjgL90lhxk/6A3lUce
eZDuMCApORVRgQoT7p1YhVD/rm54EvBWMH83hHcrUo/8tPuZ+hYPWVO2nETv4rpg7EhRjU3X7A8B9U/r

Og2/H4PsoT9ViAWKIfoRYPCKdgDQY485LakHF9Ou3zqYyCp/0vYFAWq7C/zK3wAFVTNgNl+A9/NJ7ppi

6iwgfs6U5kbHu1sp1jzsuGpVHnY7R8uJZq+sZF1R14
gMEW229OOVj8T0Eum7M0lB42X5dBHv15Htmk30dX9T5Z0AXLS7ySkjEQ89rRn8QWKjXYLzMQjU8YswJv

ScQExXcXrRNZgyA0E9/k035REnCPV5laomlQ2tUmD4krFMFupIIqk0rDQedAUFTLD4Bg8d5DFwgQyUnb

Co1v8vdJIk1w08JyRKmX8KeBHd5lrDUpPkDa5v6Nnf
hH5wmv5bHdMNoVprz/H+7M++14heHLOVZAGhicMoAnoOhpg3eoOawGWBv08+ZrM+OuIi3SMNSjvdj84y

vyWN5BVLHHYm7kizbZnOoMKf1voUW9UBLRruZwJP+krvalEio7S1OcFCSxXCPykRYdvVriBXpeluTriR

cFHhfvuTkm1ZLqcUN/uj85a4j4iCSa80+mF6BH0ZX3
BQQahHyP0OG1XskelmNOnOgX64SfdfQcA5ZpvuHPER7qZEb8KKQ5OKjyhka75/DEpCgkapuwwgsaC56H

B9aXa/Ux7Lt5Ja2zoMlp/F1l1wZlBIKTZGD4SUt0nXfOQklrdmcTnh+G7auZLSOLXIhSobYYOuIChMi3

8pqIUlTeyDfj2JoyF5cbJKCvz5z/0ABe38o47NGodf
C7/x17jSl0YiJUEaljw1ClraRzsMoiU4sQAFj4HnuR0cJ3s0lN4BevixEFNyGsJrDsk089rHowyArbGy

QDmzNBhhBEG+Ety0clVUqt0xnumIN5apLEXeu9N50MUuPO7bNRy3vYWwo4jO7I0HkG0zRS+cxydlTebE

PeJebA24lMAJ3CitZJTLQE33c3UHwZDMcnkbdcb4ra
oLeVRUZQXuLrgcmzMbCJFolu7zrjGOzdwRLwbzcLOfm1NqAhfqDuE1EhiU/J913Sx4c7GAR7vzk4Ceio

oildG3ZIw96kaU2emnNwQuQAqduINGAeO0QCb3UoRkgOBlX1MpCDxL0VqYfDCl7CJ8+wx4qOZWW/+3H3

q6C4PiDIFz4FsGZkS6hcwsmzaio0HNvYeex72M10yD
4nlCGzwzFXlAifkJtl+Bzk90MhE0+e0uWVtC4uUEncqiFHMKNeCHQFf+bSIsqPxDYlRI8yXSVHLB4LO3

M87A8eGi81OVx+WjP1KpYU6dzP7jnvCjjnw7aiIRGxEdsK8GncvNbDhG+Vzxuly+K3V9Yleo7GJo9+XT

jscCMTtFe0ldCpt2614im2vJ/FW5YIn55tnygTGMBY
CZD2F5xexQob+k0pXQH9T+o57xQcXdGvjaRGkd2f9aQomMwNxH+kai5fHdx2FWz9E1QzB0lPUESBFBi8

eo3J7Fzh+qUYEDsdqNw9mhg5XpRReEu7wZ0v0cgKNCTpnAZ45OSP7enk5uXyMsHKgzX4KLDBe0eptgJm

YoTdTYt3SeVK8/BIdKsAC2Jhm0Sd3Q9j2BP5u/U7Vs
fPM7WhRVSrCldgJEeZbANI7rbXpDFQXuzgfElqSmJ73fYa3tS6ysRGMCDjmN31xGI25pD71sG+ep+iKX

tt2WDnTvoBZ3xwV+dkWem4i7j0lqK3BsT8pQZETzhfonxTCxONfiuB5SorIuMZh0630uf7fvMlZ+3FAN

aVR+Goi5M03nl2V0E0VM5XkO/tf4B8LtrUFsKUkppA
jiOItm6OCOHCPO00aiC5gU4RdPRzUFGJnoIRBuUBD8JrjGQK/ZomHPNBCtTebC3k0KTOaLFE26jLYxac

2dVqM2whdCvMqkWVp4HlK6HY4GZoSPEtsCe+1fgJsm0quyOboqbS+oNEBWRVwCqGdOk2B7ksMP1SEpTr

6sMGMnHA6xmVEVn3Fi26yFE6DtjCkGYgT72pxUu5kM
nmKWuV1TAkPx4KrkfWzEcbzJB7mUJvHCC0DT45PAUA/fNMny319nLeEJwLrNIJLvN3nLVFtBDL6sMxv5

pnEbdMQ5/2NtnvYkjDOkkgDJe8dGYzqQMr5y7WaDnq3z+YFBzbcRYeo4uKFj1xaLs0KcnFyPLYgMliJR

L//ODwgduqZCGWNx6MCHIoMkgI6IFrJWzbNiCtVVq7
ZtgFaBuOQmp7ZoMkQG2QZq0IkHsrWs/b6tw5Av5doDj/YfAXwdstBx/qLxSIDWF43jmzl8LgiuFsl1E5

VKNbG7ScM/ZoQSAkJ72AM0Y+4Hl7/Mt8S/BbmU8nJ7nLml0ccqze/KHMqUfwBoyZP+71Sf5LdLXzYArq

tCP23wZ4tsw2X33yCiIbueb/+J0woEq6GTDUEscr35
dePs/LJcruFy2mzibJc5Lr9W2hVnlKlV67jUjJ9cm7sYQTqqcP+1YNuxld0N1j//hx80pnhalXKq1R/+

/rM6tMATwXWhHr9hwA6WwKrT5PzowF0xfXJk450hEECa+nYq+E4ddtCxrajWp/GuD8jtB6BC8DYMYE6V

8P+aeJdC5xjpso/TiTjk9c+GPnefktAthDjw09nWSL
sFwLIAROJZuyNhJx5RYSvW3U0oeluNbdS38J6rMg/FdT6RJqVGAhLpWyFmXmahDqg93bsCpxtZea/ZPK

mWDPp/moeFtSdyUhFVoc0ea6crUGEwlIp5JGlmwdfd/+/pvnnm2HI3z85tH9nsNA1qdaXEFz5d6DU9Fq

+UOK5Mo07+opsddY1Y4oUjCBZ6xDqA83m7gTROnr/3
xpnLRhxARy3lOh9Z5qUD1Ur0/lfUoc4dJkinZ7lp9wCtzge2F/o5fVK5k0zGGLSnweODxfr+VThmDMpw

T27RiN89o+kL03h/I5MFtFj7izadnoxCtpH/sOI5CPkGuNezqFzX4NIe4PT+1++hs70IbpF0UwgdiZrX

7HiWPAWXtTxmUYQc5yOw/mtBGgaPlh0lt0Ove7AhJt
CQ7aj5QqgWjXN6oz/zIKlejcW+66NInb9uKdM481RkteQM8YhMOmS9AlyWXqgB68YuiGezMnb0cFQHID

OS3vkZCc2YvyRe3qgzUUzc7d10TrmGrWmPuXe+LtCpzaRK2HrqC/flIL/2i9YCVVDx2TPWnntaf8y343

NB0oLi7Q3+UDfxdCPDuK5Xe9YSx8QFAOheYu3hSla0
70y8YtDaAaBTvd8XWprU9/BHgLwTlF0laUo5e2e7h5MsYmMwTpr0mI7yiuSaeDO5VY2K0zHK5JfehH28

hCmo9WCtsYprZxMAd7neP6psxB+BPDvs9iiD7969mZnQvmi2nXmvBWfF/IASczvHQHAPhprh3/M1Jt0A

7771B4v6uBWpWynrKxmL9gbSUn4ijz+dRdiV+bAiXp
RlWD8vieSj8pXAhxhCASxh2Fm9AOYnQ1IO3BMHl+iT1CII4T/9ePxjgjOVQ+oo/j9DV8f82W15aJFs7v

6kC1PRXt04aeFeZ+DlQOVZWn2RV/+JsIhgo2UFeVI/jThe5pGmQWvwRc9npuCytcAVRQa0ALS+AtDM4O

ukXuGLQG6l5uieg+u6R06D0s5OTXImT+3KdRe2lZkS
4ciGSn/x6IxNsEd6dx51f07cT4pd0jhgRn1t1rP+XiHByv07NofDRGF6lBpXEtZ3mXbvuskXyzq1SIEL

LWZOXtooCvQjX9EAWv2HiSx4+NgaoqRJzCXfina6itVVcH/qjX1x6eqYx3Q02PpnSlvis2uIJaHx1hyQ

mb9isJXmS/0wR11oZBe7rR7YN4dUovYvREEf9hCMrs
C9BpJumngmRh27B9JTjC26qdgXygLrpp9M3NUF8x4ed/H/HFq/JJZntrn7B+5z2XdWMlp8/zV9HXXMRQ

CwyPH7lipIc755RLAu8S7/KJR14zXFa/ijbet1lDLvK+60lRbVEsEkMVzPz3Id7cuF4GSh3L3qWcCYZp

V+Frp+lca1lgp4rPkO7grH0mwRJDIwrXkvCA/iRYEa
9LOUG5Vk8kHUBTV02vbka3GbvB7abZhfkzpUc93u5a0RyztcX+t9/aEHnv/gO5iDkV+lbzl6IqVfE7Pt

lJADdQjmHV4gxAkm7yc2cj3oqQxjLTNu0sbWLVsBX1w5GOM4FWezBX9L6CT8FWBhV78fcssePs/SrfQ3

wHi41UVaqIj9UoWl4Nn5v3WoLYCSfJBXH1DCon/f1P
PKCDX9Pd7IS/5GAGGmT5x8JByGwvze9YHHnSmZiVuQdGLg91wj/Mmbyblmrr0eMDjjaKYNvvgT9hl24y

FJ7H5Lo0SFjbTvHXnBKAbjlxyQexzbEoHyk6WEHClrDBO7G2DKHSFRXgU5HsfnjI+TplVQtgG/7uIwVh

PBd54Hur8JStklTZCB2jPNNDQ+jgWbRcSU3XFpYZRp
DfhgUSeH8Q/qV0UYm2qHpzSRnJD4xDCtJoc1BPwOAE96xDXRtc/6+Wv+g399OjNNc9v1PFM9DMd8o6aC

RKJH5b2c+wM/vHvBHoE5sXxT7w0zL3EVQ1oX26g821Gkq9GamNK1cZ2GpPwG/fYR0x/z9Q5aaCM+mBqO

+7THfnMwWETygJ8oUx+uJUE0mPALqLdHtwpA4F8/NT
BgbRYQBePxhrsgQDk8oj9JkH2prfC0LAhrJQ+1QXbJ4MT4ozXqJhCJ5JyfGTyRgJm9emzKAkoKV1f/0r

vY8XObgQbzG9Np6Bi7mZz5bvGasI5WVGEnGid4ACGQoHAeOx4ig0m9iiv/3zrd/aX/s2yv64h3PcQwec

Kq5yf3/CE5vqz7syUR7r+AcCA3K9GBd8IFVsCYOWsL
MhdRb9O95V8lPiFLGW2I1XJNZu1A1EdqJeBttf6T2CteRCl+YIJEG0+nGcInQ+n98Sx0ojZhEi7KlQa7

wpMBKlX1zEHFiQdPUeYonw/9KsNzp7szj9L/A5K5ZM59/LS3R6l5S3th7qlh3ytNCoekCiloQCHx2vmb

i7n8WQSAT+2NqmYZKfMJoc7hY5TUIzVV8ZtIOv7Vkb
lZmM59HF8nIXQuHJHzfbs6oo88WJbMg5T3PZXd9VJVR8EhFmTi/qarrmxWg7W5ChoNn3gF/j3YksDPJg

6pFaH3zdJB8z6Ozd3aTkoqZdJ/SOoEQD4eaKfjmXLK2Kq4XQ1cifAGtHy+sIyT1h1mBHxbQei04IHlHm

G4tjiEfbXCXzlPHnMNmpD19zE3SWOAPoLh2UxC6EC5
GmjJ8zNUhRRBhvFEzy+QfYmNhv7hJmMGw9+u7fvBNHhvXolUbS+APmWecB+vU0AzIWEAvexISqUNt675

gzDdHJrwWzMifuOwj4c+BtThrZNqbUZeYVrfL5tFHcRFPXDdykscrtEDvrTeiVA8HhcFSzAS0C3JHiHK

ib+BNz9tRpd4YCflHI4z7AtNQWJWcs0EruYAzIOyhp
rQPZwTFymu0kgnWBRpQrAJYUj7B15ynpc5SjWGDT3FIAXmI/p5FZkn7z6kHjJ9je/MQtF0FdSKNYlGkk

oIxVzaEjLTl27xX4QBqYswXz9hDZtwPthd+C+pbcXQ0hxl0G7OV1//sujxQU2R3R5pRDF34Szp8078vz

80akyB9+knFG9ktJo1Y0kqsf0KKfcGp5TrUaTL70hf
52dUNwIZmglIyQPEARz2VElUhjM7PiEdS4FJ0nfQi09dL/fKB5VBF4gzFiSESHqC3UXqOmUmjHu3kj6T

C7pC3aGVHv1lmKDBCUWRXx95Buv+TKjAm5sooxi1VYiO/L0EKxD3CAU6Aax7XKGiO78yq1wp2PtiVMqH

g78OjmvZ40fLqULa50RlFKQBwYpsGsAdv7afAOefZi
BFr26OzO7PfrIWZNNhy37nX5Of4wx5SGBCpShXobvlCmp+rxlNPR93wYZ4GZJrbxwMtJJ6xng5Fp+cx5

r9i6SZmkLKUC64VT2hVXNCHz8RVuP4JuMhGScjUeXyywWt64fNTQBPzH52yFEkYN88u3MPu0PwK00J86

1MwiHJnlFmaqjTGpAccwi3AhvLN4dUh+MnrpOVUyUp
RQqXr9tRPIG6DM60DQ06f8NdD4GOMGme3rc3q7yhl9DdotepkmLnHnqj+S1gGuCoVmSOu0/UFCugPZa1

p0q1xLzepq2v4FDOiQFIesfdBw1rzLAVgnG/ypFLVbRBTvskZtRnxcIMD6TG4REHZCrt93TzqYWiFpOd

CRZcAVmFGTHOL8QRGScKT3gxc4t9azEDEMYQ0O+dZQ
jNiEJMnV46he7FgvLb9rJQ/SrhyP0cFpFfUxb2rE7nykYjUKh4W5cLubuotpGfUT6RhGfKTtPI5igmTI

VwfSQmeUWs0wnzfynWF2qZntcWO0YlaQxph0VNBAeSAb5a0J880WamPAxkpOYqVrne7ueyFJFKpR1V2f

lIwL2K7SQ1j2y+YFuBG9OVuu2K4ty4gk0a6lUfM8B+
bDV4fFxK7y1d9OadZZcJ3KRbFegmt8m2nIz40oSooQoSaJTF5MiPvqsDezF4LXgPujPg2sCTKujH+0VV

wLTqzkszzIgktEnV1Rxqim0LYoqPgrUeMyW/44o2Z6do5Bui4OQNMkodiLPjS9mUAGdmsoioptSPDZBY

ji9Bb1PaU9NLchA+UlJTUcDQorLGFmOj2Oeei4coTT
zl8gEQI+TQLCYWspoL4af81NNZzXEIx9d82folaVU+6WoO+FgMbKpuVi00g3kcrWj7JEKdrIuuIOwSjB

vG6yR9WrdNNomwCMCzEKUSHT04MPTCjLlSvtd4BGBtRArIzwpSMQoLTeL3ZLyeon1OgILm5X9VH4xA1f

eP1Ga71ljdvjtP+EUOm5oWE4d3y4WEX9TcNZxi62ih
JTqVktvTM4zULEE3NM2DZoA8hiOHnvf26bhhb9jEZ5uCBEhRh9eD8ODEjlzxDFDENXgzy9s+sxnM+zl3

nkZemIX9LbkghhOriMe8iy47fyhoUieoHYRFVrct2w/LcrcjLUEXRzTLbRK9UqZXOfbzpMhzN/GFbcwP

BMbp166CBfnoNL1D3yC6bTKHeCGSTePZqpB/Tj5Nod
vZIO/9u+o5AE4UGUQe4HcP/GYyxRBq84NXbtvdMsZosdLx2Ix3LBTx5i0TnfPUczS5vwsctm9fDF7QY+

FYT1f3Sl005jdhe83RKrIesN6P/rrzZ1t7h8qJpDNrRzuipky4MgQfAsf5svPNvpKA2TBiFNcxMjcc1d

wMb2ICHrnI+1EV2e4Fl+56mTeLqzOBnTIi4I7BgxB1
btUFvwdg2L6BaIxELCd/lWIwl+3ORz85t2up99AmURu7Oq88ygBa4mKDVRmIFxHq0aDO0IvK3Zyxfzh5

1R3OV89N42TNNSfrH5PpQ8zDyQ87NTe/7GKBxy+7lsY365XHLcgGSp1hcHa4VcVEoDTU/SrZBEHRq8aj

08QzRsrf7A7L6qgd7x4snyJH/q8ddsAkhT1mYHi5jP
w0ehasTh6uM9YPO3w2KyXV+L2aucjgrQ3qNfGbpYKrDhWvvDwFrNekvGZ6PuFIBjGM9k4ief8Rke/3bb

rDxduAML4eXtc7K3/Z1/jj20GyF85GKNAwFXwNLaz+x4FiPQTIRQhF4k2aw29SUweaddl4SmB8VWtYiE

R2X5qGMWldnMJ2bKxeIfOK3U9X1B4De2YOOE6CZJFV
GvjIft+9Z/bMPueaOTPfOXPONT/cf0tDJsVEHC/veioShs7HYxP94vKhf/m1O9hp7Y413ksoFtNtXvRd

HNN9fv77renxTJBPC0WYwlsop8uPclc8ZjMwUvYYeUcxuZynLqGKdzKlpZ+H1nqRrvkup9XwjXRZ2nnl

+LSyqGArAIpNRGuiYYJsZjgfc4rhKbGrnPJWC+xEhH
o3arnYLPV1ijMzueffM7wAPWO/RnSNJr6SN9BHpOIi23U36hAWJGZ4cIgZHyaR4pRUhFSvUrs/Zm+RcO

MIheVxBLwHQ8pRltd9WEGl69t/ShDND3aIHCDPD9LI4ES99o1R8xAtuxwilE3EfVffFxDEZ90Gkqp6dG

w76oCxhEumgs+ckg7xNwaQJHddjcZgg1Yoe6yRk7eK
KBjPn7WujiqbVM89ms1Oemu3xITGhnqnyOs/8mRsPXnnhxAlJsRoSzvznXXrEZqPR7RiC1HI8+a++QEw

hlB6wIzGMBtNG7iav4r7quddEdGeg+a4UU0ISD2XwXbFIf77lCDiMsMtd7DQMRD+En9HKP/udN/mSH8x

oe6XslPYorRdzqUppIGxnvQsQPbdnGRKRKDgjix26A
vdFPMuH5pa1Vm9TKsyvMNEbJbgLy8SVD9PZBR+s/TOuVGtxDOZDH2r92wa2zy4Sn/40OVMAgfGtnGwy8

6k4RS1teo6zSHdq0T7V0LR7EVLVVZCDohcV2tM2HWuzfw+XRLDZTtVBxpzehIReF5SXYYx8K1i7YcCt9

reqVmVPEpbZcCgAMRuqO+woMorcK5qU0/qTTJabqP0
oIlO5OPPm85e44ZAnlN9CKFx5n7n4aMK3TQwrU5tJJZlNQ88PCtII0l4v4o4A0cKTwtvyea/TMPq4nEC

lCx5YpNJYsmAUlbI7lKXeJn52aWUiq4qY9AJB40bSfIhB8gt7r5bNGT8m5IeTKXMG2vTSkmBfSth5M19

m7tloQ7PNlkV+GZ08N06fc7N2CK4UTb3pTQIBvyjf9
nU+my/z7qvsgFu1wSp0+y6uwRH2rDKVLWhknEk1Rbv362+4W6iDFfKh/UzPW6jNTAFjKpm2/AHvVKu7U

+arioPXUfztf0FOG69OgGvWqfYHYHpqfpf2f6ff2pSzd4DMjxCFI6Y5u0L1vWBpZlwOS17WKVXgWH0Br

QV911dU9kCZg/0MB4WYIPtiqwTm8QHgiXKxhkQd9du
EuXE/5o69dn36r1Fyu19OmJf4lgOkBZr3mffZeavln3Q/dBtyrbcNTc90p04OqZMzt1ODdTa7i6c1yTK

alMkw9lmYJe7QRYL1V61xasvHZk0O1PVVeg5Rea+R5enzyaUl09hT8/qs1xy/nKBZ9aHYmPFTVqQiCrF

RE65KEz3gLwPMzuD46Cv2IaRvAKL0+8HvSBfCHUcXL
SR7wMUcWLr0Aa49skz1QXngbaFUhhYC3Zu96tvr0CUNW4MsVgmyAV/WznTDDIR3uc6qf/hTsNQ8y73Pl

3Hw7mhZYxhLckZCTfn6zdL1BdOQv1tte74rgoNnI/IYg8JfUlvk8saNpRpDEeh0bJyOj+uT496I06dsL

IP4Xr1BiPjmQzfjCryLXlUZZcp9ENM7Il7qpH4ES/d
Md+00UXp6ieAHN/8gRDGQDw8Uo+/TNF4Yb9Xq+K4o/arwLb+uG8ORrFbmehmTiJEGSfgMcmqKoD99AbC

kOf8FDioKX3PD/8nguO3rPpDFWAolGV1qwFN2fUrAzKYFrmjNlwK1RogzOHHrViJGLKaH06Z5ytT9dFX

avtSQYqw+vFXMHqisTqYnAtNETxMMw+3q7Ju/ZLpJe
PjcWW9T8P1D2YzFvqXcC4iEsqqtIz8nfsgWo4JubyRQy3W+UuklT3/cbOQmAtIrysLR5ffuDqitDmMLa

2HDY83JtOcDlyQ4bum2B4cuqWqk5FZDeKVtaPPtOHpXTUJ6Fwe9JhASjnlAN63F3lc65rzlkL4I9ipbs

tujUeggItCTh/yF9Yw5X4vtyLbfRr0ka4vwNVjv18b
UCBLEpWlspWJaj2H/GoR4JYBR8hypmmiQ7I+GNm2DkvkGWHotPcxQ+069Xm45P00PxkxFXKcRVC8yJ2Z

UJGCfUasopn0nyli9hA0qQthDJqjGd229J3JY8sxUHj8vJdY8+4OPdx3wjsHXh4qGHKUX9vrggPpRPvC

zJ/pJcTLLfUUo3w7idlzShsOTUiy2xbR08SDPpU0Cn
Uf6Q/frmsKOQ9dmqx8nZb3OIa2q5YV+ltYnJNHjrXbtapxMIGmDwB9/t6UbbyiC5cIsl6TV3QCDysE6l

bh3lMoQuH5r/kAWA5l/qxok7fVQ86QLbEjIjgxGr7qomtLZnvzdGvQ2ikosQPvFwCjLu+1Um/qhFlZlY

GQNfrq6zV4z37LZBgDCWUdbx/mK5TN9Jt+Jh26O8e8
0tjGUimkutraA7vvndpFGYSymEF15oSEcA9W36PEm5C0GaKbj9PxUCJqh1OPeqt8IW/OAhKb/X9GiM6E

oIsMUoEMv58isagqLg9LjUMiHwguJVYNESLkle0JVwtaVoIXdjap6ZkOwmegn+sC2JtrGgcnQb4MzFnv

MtvmR+7lihwt12kdjsAD8wJha1b5P70MBvbo38azhI
EtED1IeoSTX3/V1AUeC+W7diC0MYsGAtVsgO7WXheBrAK1qAmbZIYdjaC2mxjCMzO9iMPSF0FeTE1WJ8

kxG6UDa4XI/9ga50c61RjGDF/ijDSaJT1IYERj4hXnUq8vYSqoavbmhu7Z6hwUdaaZ8NNfYX4K870tcM

ZE5LK1qnKIflAdAvAwqu2Mx7vFpe8h2g6ElIktZbjq
dcrZWaJkiRa1TiuUf059wJNO/FnWL4BSwpQ6CXLY/VgYG4+YI6ySLGEL27cp6R8PZj07jHhuDl6V5T92

XI7vbgEAFQgoWJFN6Rv2H2R1NKcJfZA16L53uf7Ag0+kvRcJs/Pm92x1/TN+VpHhn7zmjWgwkSmNVQKz

rJjcC5kTxMLSuV4JhWAgXbX2B2+GL252QojWCrrZPF
aUGR5+linkfvN6lsjtwyzYD/gVrmBuF0BfNnGrbbGcTCVXRY0DPsZf/ADA+KX+WLa/8T0W2BvIEW8mEQ

kGGrSwS4Vqezr93dVvM80B4fHeLbX4dCKeiafmJwTx2btKyAyjgSlAfSr0HGu2Hl3V5JTQWxPRQiNtF3

6lWjdLK2aRyUUnvaNwLL1qrzUMm8xdQoa++30syLYA
r+ilGiKxlczXozfaXDgZIiPzcb91QBf6rJd4PKRH6rRWddj8ePZn+rYb6RUilc8raKjD4hjZKEvNAeu6

9d+DH6PopKxqwmBJ9dUd3snW8S2HPN9p/sVysVxiiFTuZfkcHhyi73Buhz2wTjbzLF4k64c6HuTOZugx

TkXBPbgCQmsMgE0OgKWowqU1/dcy5NzJoO+T0XGC7o
umWBQA1RYR1qGZG1p+NSaa/JQ/Ag/QQ0+z09ycWInhhXj6xG+W/MVRp8vKXYOXHve86sUK4oy8KuMAc0

0Ehmuw+H5i0Ho+rkYbdR5BzrwFCtcyeRNHYrIFNT8CofWN5mPvDCi9QGn0rMZEhSpHI5z0ZJR03or+/T

Gr7CQbGYcadqCP35tjaS2/3/81V71aFT1vIeSXWTcS
L9g/s/HVuoDHi2t1iDA9dENk5/GCEBdzC8vlKo044FN5QDjGXas6frw1RtNQx/ySPqi+yv/Sr6NcU/Vx

W3UWB3ELyFYnw8wCrsIhVWkBKgLWAEc7wDCrtGmoSrxVxgPjZ0thr/HcdYeLuyTOUSTBTSbNPwdve1Tw

92N+joJtfp2nhX8npj6Vg0qXUw+jssIyVijBe6hB5K
nxXWzqO59EGl7FY5cx/QOKRikPRwOcbr3RfvYgjo712YfTBJnM3T9BoW/BCOrQNF9fox4Luow6KF1hpg

C8gUJRMNrNNATfzZXWSGjPRIk0KpPszHhcYLdKlGE8PxxmplIOD6gI+xItmjoz5lZhkb8Cuvyy9Ojh9F

xFUfNcf0d9u+CtkuZCT7JnC4Q3n7WOGupJ1Ak0abTn
QweKLpQSgVykLZrf3FT4oJHsN+ClccD7jpXAxMl2zG0aymZ7HijolT7enKLyIAE8BdkK9S10h/5KcKXw

3T4jxiKgHwFQgTpi0Unc2ROb9xXfXdSwVx2vfTonFOQP5tHWLuZtktec3uaNKRxfR0yfhtt2Xsl7ZtEU

LfkztYNI5nrpJVlNQmDT+3A/sFv5OHkgG+wBoDlvF9
QUkcgyt87b0GYY/PSObYgpenrqWPWRuz7/lA4Tf+rQKgzbcRI6Yg12tlMcJfDDeLcrzYJAbLtC9RpL2M

66Dd2xZWUGPq/X9497QCxpJhME81bnYSx2P9NRPyjNf/audehLxQUBLgO4idXIl7QGmYZVgVrZgF4vbX

4mOI4Fjtc40P0jyj1VgvRZR0q6jM/EJwso6qNITK8v
7Djiu07DBhjeaMEmUGd68dxoZxb5p13MtSoEpNzVymwjTovPVeO1U1e6Rzs7WSAvM4Pzcq5nQp0+tGBA

GLjc9Da4yKWYD294RhkkGmcpBCRlzpvLZkKg4dOy34ADVhEve9fYwf12ruedKPgc4DCOi68fwNpoC9+j

taoM33pG/Xaw6gfFywZ53UBIfCfs5Pm3GLzdmat5VD
ld7Cgd7qM/lwitTe+VsxW066OoirHLSxNU/lMfqnWdmhRm0aYo0kydIqkZp9ncKW7SF0Xbhkv+chMcoL

lYtczX2ESsY9bxqvrxcKzB6kWh2UqnrEL94O0EZPCdnt8rrw3G21xXddUjq/XAuJ5m2JetylNWccjLHP

jVA+CvdFVD8qEUXskkb/ERQ3ZyrFkszjyHoqX/8LmB
uORWN1TZTAfhVPeWw+oNsYnT7B3smP4mtsd54YNw1wC5IGv6zQc3mGw3CZcQcG2amN+N0UTjx4/e7gNA

PUrc+LbZpW0jIwS677HLbM80q8Srh8XKbxLNIvc8x0sywYr1ROgGunPWXfX4yRHm5STZu3Oy2Gtkq6Oz

TNU+Pm7F0gUxCk6hwBPx2Ve73qVVxP53/SSAslNhWE
saZCMaF9T2XE3txh+Mb+UZ/NQF2t60ygvsH5+1JVl03z3D5KDx5xusM7KDVy+pBGQ34ToI/AvOzQA8ON

ws/wEgg+4PsfUc29il8WPlZzGf8MwTleKvylHkx0LR9o7nfgZRFByloseTzjNHj2dLp5O3G4I+bF+Hym

QDcLIts/rqKxaHFCPRBubn1ltr6h8LXNieX6affgwF
CuiU1Qxx6ggsy8uRJRXzj8unM4YZQE8yNZB9XTXAe7P426ZE/slYeStagj3F1LmSBukR5/cGgRxKFMU7

rmZwV/bCFQf6QN3NsUIR95bSjq8mA/JTebOGwevOKFuN0T7aqGxWLp3fqFp2+0DgMOc9XhUslQZT726y

UatcLO/l6NpMVFUcpHZBiR75bXPZQe+dqBFNHGfF9f
X2jnr44xAdknjO++VpbvrF2UqZmrSJK9Q2ZfXDXLwmJ9uhc4CEklh8paYGmygZ1foICk88/B2+NkeGV3

U7oCPeHA89Kh3fG4Fzy50njMLI1dRR3qt0v4awljCC7dzZrlxqlbsLsKQVROtfYKiRbW+eAo7OhJmGhw

vrJsPAIHXy5f1y0u0yP/gHl5TtX2rFGPprjGY4wx9S
OxZm76+zRlbIBUKib5miok1OIn6iVEe5e9VCTZTF29JGDn0UfNADSzt4HmExIXengMH3w89H73/oYQF+

L5n1SgWSTR5p9sTNVQfnO6Bg/bjm+Rw0Sa2/vNj+hlaSfID9KLQbeokEWkTd0AGU3T1ItU4cusDPwqUH

BMu5ssHFakhur34j3/PfJ72j0cTVHo3OY3dRuaDf9A
n5rd4wc4sQ5SzNFotA9LQwVhMbSHhWozNEdFOi7PfL0Sb7PcWY+Guqi9FcMpu2C85Ga5epsJiXBHw0XW

sg3zGfAS9826cXstDDju/4QVdZGJ8STnCkKaRkGP3cdVo1PcgSjF9RTZAp1WDuaUrMhRPXlKn1pHPeCC

EwOWWnBE4nohuMLzYfcqCfkRklqKjZtliPLTCSaV5U
7sTzJuUt5EsAuH62jlWRqgeLAPTb7JYH9qx1ORIC5VXf/0NXYXakLkt40yBUY9nIFwXXTj+xWnDYWh0y

iCq2PJwyCtkR0ccvEJxla94D2CxB/X6N8qZ2G/OMU0IteXDhRCj69Ew2wUVgl/MznBksOpCI8oDZtk2N

1sPJ7YXbdcqIB9S5eWUZZpyS9LpjbyjjbiZWiMXFDb
meujD5HbugLvqmfwr1ufiV04MrqlO7uFoNbyaHB1KG77ss5Cbc8tRpm049pmeLQmrrwHTs9NLDYOekLg

jlg7zfJ6izgK58IMIKaMNBvjKAn9ZSKSVxFKhGea73c5o24O7mmWi7448opISa9G0/F1s2oAgctU2tOK

qiSOrzmLz8iNJH6KhHebPP9UUdrvgij4vrJirhfyzF
0IvUk0tDbz6Paos9YtaDFXdudvArGNH6DjOsCAHABk4SpIGbe0vGprhYTLI3UPXdY1EBL7860T+MKsyH

Up93uFsGEzPR0pQA36acTP9EBGVwAbxkbfCWkHJtfNTFaNNN0zDfdgmxvnfSlhvLzNbU8Tr6B9S1Rey5

F0Aa5QB/WhPmYFo9s9I3ixsLd8nGeAWXhWli5kNfbs
rfslmDtsKzTapJCA42vgUBbZu6n+xUdsiff0xYdTfH0nIRnPEOgVrfv7T6Vsf6UkaDwbnbbezD2AE0oo

lP1259sNp5nTj2yOy23rXC/OLRZB9DcBLXkqHhJNw52b80JCRnKhSUif4fmNwFDty4mWbqs52kixpdXc

dtKimp3CiawBav0W6AEt/03b0mSFtRn5YCWOczrwoj
z0nL0HawnOJmqVfkfUIQGV2iTb58CxawRqroaKuV1oyyPiVTv6QtH3NHrWJ98/LGb7KVH73vsQk+Mb76

wTwMW1I+Eqs80Dhzxc/rPTElqb2OUtKdhdgCtGyS6bs1qKkWXRUoZ32/jhz8M4hEtmSNOGSkDov47d8c

ZNE00MUeEPJ7l8Hy526gWLYynM5a18sqMj8HIBsH9U
kBSWZK6ZTZhbsYbv2JckdQSvYEdb+iHhITbvGLEWjaRyR5HIH1GQ+WWQCfuC1+HtwMp+fROR4wpLXIFx

d2+EoUW+qjmdsURZMZsvdz4zMpr3i1osnfYgX5xrzJcCd0VferFajNBcx2gBv3LqLX1KVhGsBTnlAGj/

6lhpWIzgeMz4cYJ4fgmNZoIYPotB4AjFYP8uU9Tv45
U0f127nvmtDGZB5/dnx06X5MSAtENk1OwcEv4TYR8h0hp06h/7qJ2+0ack+x1z2o3GUB42ZPr6u1/TpM

T8dM77ZqWUYH8i0Ddn0gCCV/m1p5z6Ky6mzpbl/LbAW+XctjjAMswsxkf1SU1UIObVkPTRHhL7aTP6M6

RAki17PdM7ztP9wckUSRKoYNpPkem/qgNbHxVqtNUi
ZMUgwH7YMexqIpmV16rQizG67VthpMzNmsqnUYTewaX9Yf/bHaA0Ga2tMb8HkGAyUF/1pY1MewLBZ5La

BGL5Z4ZCtUbMfZkWci8v+ZT8XtG4oInxSfTaY+JPn9H795YxqSKaoZ8QltbMp5P9eH8XE3MbhRRGAjgM

+7CLN8FxYrWHfrIsAJSyjsB+tqGnLyYyDKDE/9cVdF
PUcGhUcyr698AS6RYRqVjrxL0ETIZQTnIQ8z2Yz6dLbDZTuZDF3pLed557n/PPpndqsynIMMDezN9gt1

NRI9jfhqYNKvW3r7QFGE8oo5ZCXcy3sHRirZ6GsQB6DFuFgpU6s3zKfMiu3Ya9a3ReABURwMv2rv3NaR

4ZDjFwyX9D87jR+xQH/FLcVMHWmOteoEgFmTLKT1xw
SKsPuFslrXj1tdcIlWJky//3FsRleYzgsejjvRnR57AhCdKwXhTw/jFSkoLCT/wRD9Ss0TKRf2unAXRG

dLZJxKpzPQuKd4rRF6yPmu5ko5hlMSwq5h4XQ77Sq4/uEOQGdGpN6ceX8Pu2j6ZDmn7eV1jw0HesZxmT

NqEckMv4X1EYJ7fhF20yPXUMh6wMCh9vkfODRfjQny
VXXnvxzz8nJrkb+h5JU3Y+8/a6eDZLT3+x1B7vc/G4Z0cxPpKnQnXhJAU+YLIKuETXhDZ6LKLGZLCQLk

oEx1AR6gRSTMsOAVGsxjJqBvnAthyfGj4ltiCxCZtCruKMH74tdUN0glIrwkTNTOVbCYQQOTOC+FVjlr

zs1g9/yZKtOYqaCInygqQxSCY1DbwPK+aaX9uvgujp
rlAW2MLLzSvQsi67O3mjyoYGG5hlMpkvac9HgGvgV7+M93GjB8SX1l5KdsAz2DH5/N7Msc425ndZmvLA

eQJmEtcOs+1lAicJWbHiMbIcckfJ/ag5daX6KjjNfnprxB15v/baxiMx4fZHeaszeMSzJV7ZgU28EgNr

6F60rWCJS30NDAwY4LoRaPAFM+ue6qPmD+FYQENJ28
mx8wyC4w/08ekBanldxRZz3BOMATdYkr8GsqkmRaKtkNwLxH5+OVoCaSQcEBDgIEig+ymHQseTIRmMha

AJzeiNwoko/fPfqYhedXa4O+Kyq4B8x1vXD/rBw7ZQ8fJYUQuV3HSD6qmq7YH59cPWQd5E+Cy9jLENMa

obENdyST2v4/igYiybTIyJdmRSQcjSkR+1H6ucYrHa
EwVbKXD+LdSvzL0GzvKoMwi4P2FHjpzdVrCs210FcAFka50XFQdBRYbmkkRsDJ3fUAKvDURl7go05qxC

leJPYHacl/SZlNJfBRIksOmRdI2bMV3Eew/WdeLFO9L9pDkp5/D1VUn9ZFGuj5sgoBp1ySRlu/Nuko0m

IwD9sszlrrjrVe33SWaRaUJEWGXYo7CjLzfOq9az3H
iN5hdRJ638+LGjqM2497pIMEIsgv6HuPO+XKzbvEz9YXQt5AlMsWsL9hg4jbAI/gCpYCGydTIU58yvMj

Lsz/hW/BdW/VPey7BLZIIjgInGT+NR8ygooN/cCP9LjJ3JQJOzy1envvMSZkvrw/4RFcjNf67+FnZYL8

p4I6Ckxl9vWr9rJfW39EIokJq+4BAfsWMnWU9g5NCd
4NCD/GUZzEDOKFh5S9WvcUPEyS6UDw9NBhHCKdFJPKIFG5GQTX26SRORRC9bsCZ3MBRI+SWFKlrKqmUK

oYHH9ICaFUnRnINljp9tLONBq8V1SiPHyyi57qKBqxxRXIJ7877B4XmZESstP2pXwYypeWM+2KteTusq

vC/UFrT994eKCOtBD9zqeLVHlG1Etr388IxZuKsp+S
hcG9E/QjDn8SxBi29eKQvB+pQihRZyoevfKa2+fsLeIjrk3TkbtBWbN8sc/kPwJORX8GgXr2t58JRzQ1

oc7fkzWypsMO57n3km3fig1TKTjYs3xEZQdipbGDtsKUtyKSo5h2nw99gRDlG87t76nHEIF9GHuPRt+K

lX20QF0vfMPhSQIKjX1lBiz0pXM+UHJcPCert6Gm2W
eBNtkjAk75ozCk+AAoSFRUMgPc8jB+A56Z4SDcyFFpxtvb2UxZ3CO8l6kduqZh1T8moXVbNtd3xUGwm8

MIReOpID5dZ1HHta1G2yqcnsdbJzd8tePdJudAarVOtPakVmckg64WWVEqq58qn7w8a4b8yQsOOfzLhs

LTtWoG6o5Nnvh/hk0p2iSGu5dtwaFHAetweTkn3lMW
aSb1cs5We/XUY4lU69spJiVd1jDj4kGKb9xp3oIVMIz87KSQ8dwDxE1dfF4ICWL4RujXNsoZJ2x4jTjV

4st3iwmXaN/WUHBruG0lZ7JoKfwrIMuQ0jsMWTCumMa303Ih9pA+88QR83WkxZdl/+EObbvW6sVul2xB

78gsOxULvcVb2ebJv5gncxJezTonDDvdT/ncBwtEjd
LrnN5ghtlgO6Eqyiwz7c04wfVLnBqHYDe8hVPH4NsjTglvh36hgsTHxQAZECpO2ajFn42pq7eyMYy8f/

c5RVGWOIaTEv8e4Bt0o1p6kf/YxZvCCnLMEJ5AYfYK+7jGc7KWh4nItjupEEtMyB8cCiptrjofcr/eOe

v8rg/XeFObSICXcWAxYhFnSztZa0PfQChd7j9x+xvO
KaeRIqVBxmR82PtrSnlEvLn8Sf9DHYManCTzqEHpRR2ScsbbpMCfcOcJqDev5TDlxStyDTtm1ESh7kOf

9F5l6tnU2DVX85MANCUbOBKaQRGxBGopi7nRRv3ZVgEAVQllRIA0VaJytSbbIT33i6YBqMLywazXhzbT

YYGjLOszgi9eWZe55bMJ4j7o3xJ3ESCdNJVCKu32Aq
GZklHYBWNbnweLCWsC5LVpbHZFBlbRdzGKTTT2mjtJWlxfNjMcq8cQJUxrHUYJYIUJH2eG3CjorOtw77

8tvw1uIiZ43qExY0B3/cKvjK/QgmpXQi/WOpeiSRjxI+l6zPd2M8JIU1aemlgr9GskgiAbGbHAvmqN59

vuPBCeECQlEhxlizgw5GyoGTMcKC30uPLQlyJunyjR
wDSuwUGv8gP2xFobQpZ256fBSCkUiiX41D/e1iALjTEs9yel3sK9Z5BJtMsURF/cSXmfZpmJi8kxosnq

XI507FvI25BAuSHz/tlqCn3/MZn3cz+M5WSNP9clnG9Fi4IoGteuIdhQpCevn5i8Mn/c16wUBY9JYov0

pnE03/WCWQyTN+EUQ9rbGZrEIGwLxuqocYKUd9cb8x
1AXgj3dHfpvxIJu6PvMYDD/lbaTtgwAwVfh0TBHztfCDO8QembAUgIsDUTODvvVsee1+4W4BhTKWnqYm

u4WmAh85pjL1ibPs5BlzJKjQOnw7AHZ/CGAr1xLwkaS1m/j5lAzolJbYLXUP76f0hcWrCmj5udskV8W5

qv4kyRV0oILCg/QjpQZDE/qFCLl5nKSnGBDPF8R0sJ
E7VKrH4ZcQpX8BJ1cV2aolQkpd91i8dsirOu3tPHiOvyOGstUK8axPnIg0o/4IuPT8pAhTg57Ox77s/s

jmCpQK7NqXcW9Mg4ktn62sm9+NxUtbvlX2dkgpnaTQ3GwLeyh0haUwLGYaeYkFcOR+dkz0JreeNrPkNC

lsglJvHrrDDG6jY2FJXiCbeGTJtJV8PvZq01USYxBu
0471gPTStmbn9NQV7CH2zAtL+RnrE/KemmjRp0dE7PTI8bhCaa3hAxRB0qq2pD8p48zOM8XtKCg9awkt

E4Xaw0F3HELUU1UoCihefv0oLuyYuW6cRA+AA1Nf4Vd7l487NWvGlN7W3cl6TlJVYADKw0sJqg74uRuV

DFUElGila2+Xb8NXZ+P0xz5yRu6yE6/qco55MY0lKW
1aK7iV7dO0zb2H6JXqRgd6qbmx3jWjyxlTwY+om+yyMYIR32vd21NxwEFeQXNH/MCareif28yxhLF7MN

lz+cnPhEr7JE+X2Nb8y4bjwMcnt4MzyfOFrJEDOSwf6ePvfwYDX8LzcqPSJdIG4PAb9qVLYXM3yFxKM/

LTdjYfxoFCTI7DhNIvgkWHVX/NyGS3Irf6sN6Cy9e9
zf9bq4nv39sL4eZTmGcjO2c+TV0E0leuk3Rs/ysdjAhHyQve7kFSS31LjLOpn2mR4KfNBZgfRjo5gGzY

i7wWMwlg1S0L+bE3T910/fIF0tZ98MU5RGay76SgXyGsRr7Tg7QG1APIY6Bz+29SW9Q+1xBz+zZ9WPTm

tBEk1XnJyG7po5tFQpyRqitMKelJ8FfEGsQdjMzzNE
ckso83Z5CJaLuvMb9qQKTQdfvZovo7Bq31hALArw9YArwFX9j0KbwV+LVhqr0rWQgaW6EneDYr1lN+/t

rMgpUppCzZBvNhF+Q0QejKQ4jpbqO6GAXHRNk7oPRWPisRN/nnml2ZNisi/RAbArH5aTDmmUD0/CmQ4q

j6L9GaVlQGBgVBusCSN6+L4BO67p/Bygm7mDHBNhr7
o/VrG2U3Bo1/Nmx68sa5VzcRuvTm17zsDA/b514aMV+U61wdlgmuYg0HW0YhZkmyNNuQvgkvBN33K5rg

aD4a0H92GkSrup7IjVvllSgCnyuEXyEGzjrsT7u8rDfvg6aasTw8q/nPXR+QnHAGMb2hhCqqxfnMtQjb

YfqyLs6vfS+qfSwiqBzg2jM1BRRi38KNfPrETxZwcZ
FpIDGZpPTsOCrAeqJHgtd116W0jUA2rVayL71/1Pkxs+jv9ndg3GGr8jgpiqW+rj1ZyeKhZ+hjGujkcS

aAc3dxDdfZ7MCwQ6ynvGz7SGf5NW9mTRmuUFzMIOZ6rMJlZv0166KFdgXjMzuAy4bbNwDRdb2iBWaVBr

nJYQZuFUyLWOETgeUbwJwr/QG1qIzsuqE9X+jInqqe
xOmnRlfv8BcmTIZv4lHLrdV0mpVSwMi1Dc0BP8uPR20Obd+4qWA1e17uZRU3CGMUaLmMdovXX5tNcjx5

KouKacAp1l9WfF3y4nZMWJvyc9bwXUekgkNnmfdTP/MGNfKuLpwBm3CMqFpjcvwxCq5zNU8lPMqgGVnr

/GnYYqBmueBUuKV5KrNyyiZoKwAP7GXBp0qZcpuktM
WE5K/9635D6w2L3t6/GrzqB2WLYsqMNJyYiSz+DQ+3HOTA/m0p8P1BxAsp0/rZc0xZ0vkwjbg0vYOSPi

/KlAVODQUlcFkCX7cgO+XjlpyDCARwsAYAiaHNh3p+vNAXT6coJAwO1I7AmQAQse1W9S0KUxKf+gbnGt

7okWJQ0fAkxLPfMdDEeHPMWjU1yzuCqxe/KTvHhxYE
BiQ4FZc4fqerl1YkwB8jr9PoRnE1QXojOEDWF5kV8/22gwdMBGsJTRv0W8k0Ifab84O3Ui08KwfS5Dn7

+i3beE8SCA31M2OSuxvqjIzpcwmPYWV+R6rL7wijsjk7fiev0iln/gI0Enb7Y2BtwEKXWgRNNbU3P2e+

ePIDXE5rzniDoBJ7xHrmS8XmtKu3nAhjvk33xYscId
pyvbOZ5o8hXtHv1l74ZbocsrXk246p2P4DdiCjJDiwt4NkNGf1r+CqbKV3IUgf9Klswdi6EzVhsLu2QT

xZznqFW2e/XIEmiQ/Hvc97/b/1p7i6/LGgXV7GhFU4BWX2r43kBx/CRFi40R+UQKNbdDd7uwbxtSVBHB

pa7AJYK/ZDyr6i5CjU0CgQ5dg/Ob5RYzFRT/N4nHfR
Lm1605Re51/zf1/i+rYOnDatX47FMniLxvDhbOi+vVWi0Izj03LHwAfrM+2PRmZgWVN6Zz3JiDdBVdUy

vIbRCnWZYqfF13h9AK3gv3abg4Y9SYBPGTa4oozqhKmWfi/L6X8NKu1ox2U7N2Ecx5EHaemgD97ceLuN

gsn1WWYVxzxxyjrkmwWl7FlwVEqo7LJJPh2IxF2PR7
qncgm1+e0/VFwO2++i3meS1EeCj+gWWrz6e9BnaXr/9IGJQCmpQa5a8p9nlPGhuXyx2Q3lWwaK/0fq5P

eqFlUiqKDoj/G4F4Wnet0/MfvMiYNhzIH9b1u8+x5h71i8Oves26bABA352vJh4vWUzhG7J6p/g/WPi8

gilBbmoFoGcrvHRGVm6s1cZ/7TNQNzJTdbCpf+8Wrq
8xtT6zFgjc6clwQbvM8XACbtMPpGxyD41ZjFMJ/opOrdntQGF8xmO2nDJuWF5Q1D8LwWVK1Skxi/F/aw

b4b+X/rfy/lf/GUW0eHgn6ePOoiPCxuqisV7v/O3dcuP6Z0ta0MlcnTShwYyKQS7QsbsIX05EqHP6zdM

tHxJT27MKPj0MUYDzgy1iQfqpNOmcGV9hx5zGl+Jack
RXDk7rZJoLnrgkC17E+exODhbcityz4+WPh1Dq9MvQW++uMaWEzrs5x5JeolM68kD86VL0zb0e9/Z6bF

jbt22xo2cF9Qd/JOgmYStsJ4ZsLlswP3zXIJkNDLB7fo7fMqw3Lw+vAkHwkMBgaV6CZSs1u+7fWF5FAD

rZtrNIMSiWqbz+svwZjlupJ6iWG89TdiGlSE490erG
fJUU6OTasbdiNLJu2z3nwDWen9xOe78TGt84Xv3yjmQWyTSVTH8du4QXK9XZ72m6XrJpGNatkg6Dovrm

KilgJ+k8RO4c2pxfOYG/usGsCGAFHhCEPP65kpdiu/MRcmZl9FnuHk1Mp3/o7r30jAhQMQsJklIMtNYb

Nwhqclwc0A3R8+uZpqbTH/C5t4ZzDU5PD1/L5hakr3
wfADdJn4ax6cQe2xGv77pdZS0+0DU5zPWoR2RmBWtXmMJo1PZelp+x9qg+ixJrHhXs2pW11GaJizzoua

hvtVGlqf+lXaTC1/8d6tQPO2kqX+lg4GqRwu8bA6ZWWsbSOVjGRVoJZcrVd/lDSxyVoQsYf073iSIulK

Tt7Oh0JyQaWycVkYpxxhH5jkclYXYV51NJWC0zB0Gf
ElIpGslCXxhJx/lRpnTsUYRnld+jjYnLH8194KVEUgLWxlKRyMJnpOuJ2Oeo2r0rRr5v2yrcEZt5T5Em

YNO8+816BEo8FqWUB18E6zzgisQ8b9SiPkrJ5hZkeRil6UcHdim30PJSanGbtei1HkmskgB0s0GqSmDc

eEuDCnrea1BWNmGlBnj/51gVTar1Mg5NacXIAcBYdu
NaQ/PUpU6Iu8J2288YyivN1Z+p4VRN4QlXHbIIC3gJsd6UHgL59m2P01nlDb2TKuOPCRQmfst6y/it1J

iNc6nkXL7fSink/6KG4hyIHR3YKYBlwn+Fhi4aE+frgfu1vpaDVReLFxjAmgAufMsnBuAVZ/WsUnSExR

i5kAgxgNKW0LAAlqZDh+nBmvMIum6iYOEAa/WeDTx/
juz/6E+tGB53zhRwl1PBjNSI7dfbbXXsklr2vmeFYadHUbX/ig2mgNOYm9dy0j1z8jRb0d+F5N1w1xCV

Y6+NptO+He7m9jz3Epi6LwsxYuuMags7yXK2OUXzb7CXKpx1yBEeAnRFC3CllF9wfLDbmuQtONSP/sA9

spNCje2LgOQ4INZzA6gQWKprdQoLEyr4Q3qWN/yAlX
10Lleb+7Q7t1iJpgbAxliirAKGayYElxSviSKWFNodNaTG4t2xooIyLDnA7xBB/7QBrx3CdU9LzhNrra

G2WrorflAZvl+7jwI/nlU/YhyWfo8bVBq2F6SELDuzVOCW6j+pQLY4eqdQ7TQDBT/hol8SLSm+R8pFri

ZnUB539J3WLO8wfqG9Ru8u5ZAqjkDqDFWpL/uM812t
d7ZsGb6/AMzMgw8oNqJIuwM5lPbq9iNZz8xX8xk4VKxwzzbIIzzh10EdyYH/AOF5lqnUOpLVM2pw3Q1A

8gwoNkwN4QocHLE4b2vLTxDiNFEGWyqUMhaydlCwD1Uz5a3HebbMWbOpAwHRrHRZiFzYTXB4SFx7kjYl

27nqk0IP8vJ1rqs0kzCzK/xYIE7zD5KWFDVQxd2F+v
4aSTLw5xVtZI6uSw/Epj4Gw9m5JVP6DISuVMVxCbDehBDfAlh5SCR9/yOoqorHYY7LleaIhqlwfSUrsI

W5MEqvGT6FFanIWSqoRvHrsG/gPmBlE0sPE7zlkiX3wlOtf+NsN3aQO+UBc2xF/CHEQEfP3qluqd9jG6

KIfZ7kaXX61VrpOcGKUhD16pW0bftNmJ8gTwWOpRMa
YA8mQenhZkbvbMG5nFMkaNhfSewyrEXk0BtQwQOEcRyyyitLFazRZ7AQKIyJFwvc4w3z/dTx/yvCij9M

NSBQm/nPVEPUUEgMknuZIR9o7oHluSE9i1U1CuOkzW5QnD6OtnDW2HuLFxy6pupw7Hbb48lvV1zCU1YT

Znpj3INsi8yTzdCkPiiMr7XrRuDSrWP5021t0BKMyu
ccdTqmXR7gsg6H9oiChEamHFslY1OIX1t6wKTyK2BEAlOg/ooelllT/G+DX49UpoOKnlD6NbH0+AC8j6

M9TqgEq5STyy984iSvlNLA0vuzs621cee1lyCI/whYcdA0UYx+VOvmq0aTVO8kg1UjaPmmqw5Cgsjspc

eb6qYYAK3LNFHBhDC2QUQoIr5iIJ0M8ZaBl4lGlj5K
w6K+NQjCT/9JnWB1qqsd5jPKJf8+a+LV8UyFl9+AssUWdvYNbrJ2Ju9PgK/EPmxc1IC3rXPXx+SgGLko

tYIr0m0QSjqIbblowH8jaqctE3nmcE+ov3RLnQeOyzQ1acmzwllHvA8JxbMoYR4zSRWC7PwHPPYyyZvS

fbwlLS/we8MX2eeiIDbqXl1btSP6AJHSLDLN26QCW8
1VT34LWzLvsoAr08ikZvc/7gXqdmOflR84qyJUqeQ2JNA3kpC04OkPwyPKQpgyRDM8V5WO0jhZlnSxX3

Le7FcYYhr+kW17wHLwVY3KwIY/HosFm80zC5PDKfn5K41vlKbSyB9fJ6NBU9+KATWFI6qBEe+euGcOkG

OsncewLmUnp8VOiAc6Aaj6xKUI2kA/XCj7RJ/VmlFa
A9EjQyk9e3DYY+ZcFFTkNGL57lJJXGBFCJ11GzsAlBKTEF8+OushKEsVw09hpPnOiurL3GzFiErNFUHw

lVzvZIhxP4Mdbcmwo0EtxRmAHeP7E9rHjUB1UMzpPWjobXWRMV/NWU3Bxv/OAiPW5S2sCvkgJj8JAXtx

GstVnlXMTRnocYhT6wrUFINq407qLM7DPwBqbnDs+p
noj39sBd/NlMLu2WBHpqVbh9MmqRU1BTqAPkXBiEJn77dwfShj5Z0lmO7bOuoLTIsAybXYl9615QFN8s

HHxZBNKTj7hUBx0SKBydNeyEoua4uGa4em+G3bBKVySrA7uRsKnecQi8Y0QFx0rqnfjxkL8rm+J612d1

6bBmnsItpV1gqJMSsmCVtpJqYbKdmckbcXgfhximZd
ATaZcBaI8B81ZtPkjzt6nE3W1QeNhrcF8VNb/Upb5lMp7XHQrWMqgkTSTANVybY9nVcmwMEM3uLfKp9h

Z9TGLKN+MF6yqaOEhchXbd4mgG4x3tx9zv+7E0QGkdBe6Nxtj3MC/5CVffBQhc1gEYiiUZkcinTbouba

7KaKldHMRkufay61C6b0AWGuj7OXStuOCBoqUn7rMR
VvRjXu+/pHupdv2T7OewgJ5aEvrbbZJa5Mbrji+sEA2izvh/Gm+woZwa1Ak10iJPWLm679XOJ4eGCGYv

hE+RrZu4KK5Hikfyvz3TpjgI+gBu6Us8Zp6WprdEff/EtFR44QRZ67m8EbcuptGu3LcYJbK2K2uUcdPc

zDBokG7Z5z4HsXL8+MW1rYJVTwhkqvIvYj5KE9FGoO
glY+92Y6dUfAGHYnEw+bMJdslo3m/U9/Omx4M3XdI27SBrGwjAAt5Gd5tlxzcJDPXn4sgqFr/DWzu87l

PMFEHsyiESoHCJ09KdnHyHrEW4gQZKNyYHL2nfugG5AnrRA+mUZfHwqDPlw68j/JtGR4AEjznq59UTVk

lW2HrvwUwBjsnV1rzkSrJkLCVIzUI8P5jl63ZgxU4X
HtP6sLm9qLv8je02UhnE971O05S02DMq5O9CRU4++uhM18bWUWvMM9eOMr2IBOt9LPh31Mzo+eUbG4DY

4Zw3K6FzKHxc8g7vsaPu4FyA/fL9WhswJZI1ZwzhIY+SotBI3G4t9/tZgJqGD3ePJzpqYis/9lxYVBQo

HGOWqIDwwGVzmHISttvVurmDrbCePyiPjtgDlL8P0f
U8q0wQH4j/NWZhdr3/ZuHPtPJm4V/87sSDCg/uB7ftxNEqExdjQCESenKvnapP5x4LLsbJ7ZzQA5fDI1

qdTD2QxINebVngOkKF6tKy1mxhRPnNd6AwLPfztylxbktXznuXJcDlvlNGVhh5UwlsqGC3jd1fRqKe5H

W4LQiKjrI1XYEfPupb/V7yTreGJMh8ruBuAspfmEAV
mgYsQ1TUZMAHBqoiqsdVzztGMO4T8rQtq5yQDAseKSUj4bR+uI7+MajPOhJVpMiQEoa7zZ9wQkLktnsX

QJZrznE2y3C0RAABxOtfTzxbXGd+JDAYPI3lTJqKVYwijyF1Ur/C9nsYc3IYkCSkC0+wQjOsWEMtL3ag

EcTn71GkTi777kJaoNJzLztmOFcSPL5OMkUucXoZUm
RXyDVgzvNcUHS0Y9IPAXlT1J87Mb47BjTW1YvfALp4b2RwmCKlvkQwU5XNM3jUP330uk7UvCIP7Hf62v

+kieGc9M3isYMgGDAZoZeEx7yz+0XYdugIGhVirYU2HaCWsoPFEtC/XCwk5a4BIGkmky3mLHCY9E4gMn

dt57Io+m9K9Fv5frSKrVnhR/uvD7OLhX/63fkJ9uOH
xrEWDVtOlazHGC0hd5tXASKLUZIzLgRmsIEjHH+5UYlAata7kN2Qie47LC5M9qtCniDESnJ/P+khjAdH

xW2pRvXcaVp0cP60DI22LbvMc5reCLueq3eGZwUzuoV2d4CVmXDrvx8+6CRtx52rikfAPQG/BQ1N7DgK

zZIXBfh/k1Ao6UtG7nzdPHwOssNVkz7iG2DsckLXXs
2llWhh4C8AiDpkRkFeYpBvyE8345ejxArAgY4hs6kf0zMPr/90VfOAnu9aok6amRo9f/tf50V/iajZIW

yw4u4eiZXh1n4UdLdtuy918o1o9gDvGr/IRt1v/Uam13sD4KpEu7CVSxGarJoOZ8RuSnB17wduaqkE8Z

yeqc4rAxyWuCSXelAoP69M9Viplyq//r0/ZDYb+7qM
zSJe9cTrmoT1JEhQ+tkh1a90nTVa8sQO8G3DYKR7JAq3NzUFn/5xYszvsV/ML034Oz+tvkd1MCT/QU9l

0tJ862ra+m/p1aTSXolGHMJtf1u/JFJFCMwyEDG8cN6cEGFONdwjzo9RXqFPRS2ZGuslW0GHB6A6+dnq

nmIBfa4Bs3FIdeURJUzgL5pGxciwESZ82rEI4tst/7
xrQY+UmuS2gpU8Cr8ax1Jc27QyyfNuyN+Wg/W1m0lLjPAgF81M9SiSkjUmZu6/KajXAhjY4kyJfvFFHJ

+WVMJg4bsYzcuv7FAlO2pQNlVgqT/hWK0htHx9019WpnMKRhz1VueTbfY+CuIGP7ZIVvGySH9CtfB2Pd

p7lIB0AMP5UlftfmZv6kF1d/CpufrHa+XBLtyTbd2G
Ra4Ncy/ubB/WLTJz9XCpmDVeE+tvcBBqmRokjl8cD3kJxMXk/6i7bRBbavXaDbHXCnNZPUUtKWX/rcrP

ASKzE8jIQHfpK6zp/uZcIZ4KimvVlM29kjeqmaGnyDkZEOg/c8ROGIG0f72jyupxk2oPhzONUKoeo9pi

kmEa0f0H/iz5SbyQxWjo8QjCT5Lf/21nsi76yK7Mfl
pMRDSQV4Hk+geSDKplmnXqJ7qGW+UlASdQPtmCB43tFE+7nhfvHHsAjzie6AaarVVhfFKiBx5ZBFNrim

92UnLwspR/Rx8D2VPsZk0rouu4tRwO2xhhpAPpiokoGAed9YMyknefqBv5lpIVvKIUavRnsHQGbCFmM9

WENFy20Z9bmG7E6rBEViRsueDQuYtWAZVB2NMvokhd
gop0RFxvK/fzGgWQDI8dOcii4G5RjjsMFCd9ikpbZ895nrz8sR21qXovWfwtbkUf6yW4hH3D+JxWsXeb

ZGQIsCdq/jpZq3gRh5ZLjSASOYj5pfCzIYOV/thgRl9c5Hv8nlBxT5agll17T2Wk3BVhcI75VH/B76bt

uurLQQxnYAPPxg+ldyrHKE3hQBdEnGEkDDrvV4hZ5/
hVaVWMZt0MNw2BypuVvgahbVUK+3oRggRio6J46GSG0aPmZBG+qo7qzXoX5ZMyEt8LuyyMz5g4bJjdBM

D0fZI9JnwThdc7u2ZCNgqnYAdbSOKmjIMxtw4MtdlYMeaPWxikxGAxahHRawfoBDqURIVEppFqG4l73J

CvbSuADsw7XrjVvZwVU6oFRGeXhSci1UHkFgw9p5RD
b6KY15i4CD8onDgie434OZT/tL0wYIHTnOQguekXKKG1Fh0vgLrpQdlMuLsKeeYycEyyDtiiNiEk6Lwh

r1LBAh12P1e4bXrCEwtR0z7XPZgy+AQgE92rqs9FCtD5swmm7QYjNBVLsGEORb2mI6lR5lGrWw5lqqNM

Cn6mOZEOxCAyaZPHolAz46kAvzawzgqBr+xcyuiQu0
pO7ph5iBeqvPnFDpwqL9BZzExA3BW3q4gwTixtcgqny2e4OsIeXEv/B0fC47Se59RC1kFaS8CmLfYU27

JPFdFD9GT1Tmms1zG2GsP1KuUfbNwhwy+moQ+74sWPZu4AeCPAjdAXVgfNBQKVRY+Djepsgu17MR2W37

fm271YR3mEeWMzzpyeE3QAHOUD75AI4+Tmjhm59IqP
sl6/dB3G9c4gKl87mnSu39N3zHiDTWDzFn7SBYGamXmqdHeHUG8+TuUR13dtWf9UtaQCECByBKntTepe

D0A+7i1mNORG/Vb/9ocE8jVVgpU53NNFS2eQCVN3z84HE6CU5TvGuL7iSnZop6cU/NNt5qDa6ALtBcKI

I9YkCIM+LbLuOjipmdV1MIIGG1t0e292L4q5d9b22J
cV6SD/+V8NuWTvLnzDwG8lHEFAUUG9Wfatorl2V8bZDaWjh53KV1mraP0PaQ9Iv3STPdTw1SXUKJmFCc

6kST1vgvBhaDz/5Ykc1uagKR3nMklqMIfF8LB5YwR/zJAsnCygaAcz7Ep36mOnfs0j9UlyN7SI06Etea

qULunv1+n/6Kd9M+MYE5oQ/OTrWjfbprUM1Z4LzYBZ
jhOcRkU+W3m/FolL7wzhrXbZ8KiV7x9wch219CXEp8+ootTFp4Ww9TUY+6T1iJ62NMq2YY1Ci6PxG98x

ChMa8dNZJ4jnNjQ7iZsLEJFsa/9U2ORWp6Q5VIsNbqrLsgSbqLBN/NGkR1Ivq9Zl7jjGYcbTkLev4PpE

PAt8i+2T+HgjOxUiA42oaV5R7CnEyD4cnlivbkBQPT
BmAEaY6DyUTezc+nzzWEBeTXSYFsVUIkrux+IlMuvXtL5pkJmPnuHEWJ2gyLngAhAFDJmxGXr8qZFpBz

s8jLGbsJ/8PQfXRHc+FXQe3xzYtstA3v30FisfH/mkjef55Sa/t3nY/1dTYjbFRiVRdR0tSlD8Bz3OZI

MA/Gb2Xp7q1NJDF9YZEeBF5CjOJVdq23vikdV9hvc0
KydQe+L0h5QO/HB8esD4v1MoaRyktMlHQyryMlwkMryNensK+ksejS2o+vDyi429w17/fwm34oVRHyF/

Ijq95LttA0Dm4lj/IKtrL3T5DEha0krEfFJNz01OZj9VwvlPb7rPZKP56/uiauyvspYtI/AAMIZBJMce

bDNlWquAEx2ICMeyLsK03xih6p/RaOVBWfd+RfBQ7R
lYbCL99GxXUmf+PuHKl3tFrLpNl5IKoC81st0adIPmSDDnZu1AMjzWKzTzED7V4NqNnDYceO/2EfiVdu

6BuPWgvfRc6zlevDNxGcAlSJ2FUKqmyD4msaAXcnBk0apv3y8ARJ28Iuy+O4YS06HQ94f3hYkod9ARcg

DtVYQuQCSmQPL/ZND+rXKV/1CDd3gnMMoYvLge5rEf
5S2lSfzyWY+/nkeJ7WbXfnH3/Qvet16ZaTx0Ju965Qz19hAhO9m7lQwysHjgbkvLQ70wZw2NVOWhI2V3

9jj333P0vLXax23BZ9VlXC3AonFQWAwu+vXKqX/kldp/dQ93w48G6BI0VK9w7PkqIgPCjLf5A/ZBT7HV

FOCo2xtb8a9ZXCjQ4Q4x38Ns/orw/o8Z/bneZQME/0
apv61Y+z5sbpYu8lUW667kzJ7KWK9miJYYFxFM/gvkNKRFdXAZIK+AGrnR0RHxW8Ek7K6SN0+DAbs6yE

1JI1h9eanNGtNjBmJu9wb/fSr+k5scx4VB/+F9auIXAzuop+uqFESQUi/VzFtmyMANdKAqhbvLsl7mUc

mD8gTVthOtOYoVr0mXd0uelBco/rUxIo1LIzT60MbJ
sB2y6Mw15TMfFHDtO6hwuwmDI9AVGYGZXWzVTTUhXAylzfdDgEqdJ+9FAQL+2eNl4OhfFiqWRbaxKCBA

5d63Ih/I5RmNo1+6ik73qDXWYU/xFe6fg/77x60zUkDdU8ZP/OmRur+Hxvlq1Wy5ewqk0jiYQ39AdoHn

u15DveRMPNJm0PuxzdY0obdQ0DkS+YwkVJsbgs/O0J
ueXvbAIR3BoV+rr/2XCvxK2QVqvgtdWioM6tmkXMAOtx/Myb6BLc8LEhLqBkrmK/4QK/xzdoLrBhwgEF

+/9E5o232VJ8XR1wkfS2q/WydmKlMbzZfIm6eYlmby4DSqYhyqLZYJXwZUYgYq3wNLYdYnl0pe3j/h2v

R2TqUnBuH0l5oKraJrRRaVPGz9LwxNrebl3d+Trv8E
TsOVMALYI3lKIHLWlrbJemSOYQqq5+hgFBq2KJ1G3+NzfzLGlhRm5BUsTpP39C6/efHhtBJTjteTGVna

SAcHzifH0auwXBRyaCf/haaM3Y8P2l/aDk2YHnUvQH/l1nZ7qysgmafKT8ugJT8rcHCh9OKNYM+/j7Nh

ITAGk4o5wzWy/XZJUlNbglsDOTxQAnC6GBUX/zWm9/
9rOGOijezWWOYMHixqhNR2p3mlZTf7Fgg/L8zpYXEKG1E/QpJMOV1jB3XFCZ7KU4XHNBxxkqAr6AFu3J

DBHQIEd/ppgdhvQmbrtDKdrRnic78+L8hSi62v/8Yq6qrUjttM29mof0y/gxflx4Q6JKbDm2U/g+/R/F

8dmv5/+xydRyk1/xy6OZ4zZ1/EbaSn7m+Uq5/VkKNw
Z5spqXxpTdPGXpKV6cGWH4DTx5yoMpAYNblstwfwN9BkM6wQW5Kfm8P6mBSOZ5UB6FlJuZ6dYBoZCksR

Qsck3rAcYCm/FQLthcykd1drenppbdU3UzQWMBBy44VLdKEwoUYG9YOpH2EbIHr8HSnHRYFY3243+sW4

nHTfOfnr3btAC4yfVVzFBmduqA3hVoKTGcvRQgmUq0
rAlk5P0eQnu/PfISDOr3feHYPETCV5o85+5P6BHkXQDm4fbuWVqQe4ZDi6SOuuZUtJ2byoo27fIVWCiw

9R85/uYme5xpdIzMgKL8Dpm01kGWSBBqETnUj0cDqCRzHPuvyS6shZc36RogpR+5XiOpRT19R374OCJs

/u9gCRoGNjZ816WEIyfmC2uPZpsmnUoPNonkSkMW4M
jrwL+E27YQUqEOV853YwthbpRvk7W5WWaWg4HHkUFC6kwgc+nWhQEij2+OULdR8lyhoeugb/UwjsRAMJ

v5eB8gTI7UvTiSv/RQ7tO6a0JmWRvtd8/f5PHHd9V3uIFeR9yNGdeqHCO9E6sPfWzNN/+Cfwn1+uuuxP

62nGfc+lI3zsYv+rTyFnF4Y8pSCiNGyY5+b3TgiFM4
ySsep/IsH95rtDbjopvwpihsIm5oIPXzhG2JrBuatFF951iHqTnwK011xgPTX5EBXd9b+jVJqT+uyKlS

DVHtbOpKmdhExaT2r3O9B+vuQVVA1ypaBRt4u7tgmUwDBkGgXBltYJAdrLef8A46Lkl2WW4ij9gMFuW/

B/64ZH/xS+RnFfL4cLEwfLVI0TGXodDIw/w2oSHyTk
qPtZbwy6duvho9ptvXqBmLhUp6+n5tstldmmruan6zf/cz8F2LxJIyLlXfoYIOZZXr64xID/0vgDoPTH

248zEqPqIFnTk/NCIWl37WROpnSP0crW9G3CZDo/MYtJJOwTjlfz3dN3qX/T0BM2OmEFfcn35eeLm7K5

zv4SaPQxEepn12lUFhN2fZ6V7NFAvq8x/19Wrj8YF9
prQAHm7553rFQ3RwMII5T0YtxJBaV1ToZaTwR0xfN0dE+IEQ4hxN5q3Y31A/oBdAMoP2nkN6NDcBX0mu

E8kXCpT1/HW2Cml+h6fV/t5J1MgTtMZ/dWUL4uQohvTeLCJqChymFJh/UGYCpgzjsJxVqE/9gJgRvnsD

hHx09SVQlf3DrMdgKNLNVUD+W8pY8boopK3+NCsp/v
i4ah/ASfiRXHoG/DtZjhGzZFl76/hfEBzu2DXWLKRsy7GZLDQIN5UWuxB55Jguw7me1YIMSZHUMHqJsI

yWLH1FRi5lTprat9ZunWRyMGIOhWttgFYooMXgtnwhGcB+KOltJAtyDr27F7sGP6BzRlQYXxsIgnq4fR

EuXvq7c248HUY+Dp9pHYX1D0J+bI59WPTJImIX4a35
3oPqFufwfD0FLFTGXiMcQGxcg1RplV9wqJZ1ces66kOuA5xMKB3xgtnWDyye5kZtdh2taaMf1rEeXGFP

C6QIEARzU8wnSl1iAYHJlYLXN2pZJLNmZE4ew9AVem1uGKHhwe6l90Q040EA95KNaBs9bkK8vJNJ1M58

lzTCMbNkySo7H4MHyeb+h/VUzQC14PMikF9g67ebCc
DQ6kHvAdRTEcywMuTp6RFNug148kWdqPyC6KIz3dCjpDQCcNkXM2v+9rRsQXUTdXmeGUXgfh1bh+dN2c

/IUlIfjblQFE++KjJgEmsh3TaKeZ6c9CHg0l/6ib199NjbXCMITTNYGqlePCvCo71MuNirjW0lB2gBa6

dW7/uU+z40QKAbi3J7BV+5gfP7+/pJnfHks64lRz3/
M3p49xh12y1Jf+ArelpdD/+LGWSBL849uGBUV+jDi6HJX8DO9hNojZ5eui75riExQcGk/SpqK3HR4rrc

jlG/2HmlRyA1x0ruIDh1SrDGXXf40b2rorUsrjigcDLeOeHD2b30pzbh6IcOcC0NXxlkDwcvV/xc6DhR

85xkdP6WRwDc7JbvuU9gAnPt38eNa95ob+aPyW85HO
WC9GSgkEPWrgvmLMbyFmRQMxTxRrAZ8rGvQPDpYMXnPQenw3kEQl2R0obEVQ/9vAGZIrekaND3K0sNq6

cwy5Oi6JcIIZKeaTslDJID+xt//3KCn0lNtYupF/VYbid69YhSI/1ceWMv49SZnC6uop005evbGmtAVh

roHsy3A7Aa/mbLdlCH0FKeJSZdS7lh7Bv/IwT8Q04v
1r2ooTyIRXo7H8T/27E/0V1H0oJ7F5TJOZ8aWbrTV2L6DD/wiMJW1HKSg0zIjvMIFIjZzxyUPw7ClGhv

uEqs11oJX4AM9uwOqxZQFbKJj3MJ4yCcNrBIZ80ZWuvDOy/8wT7d/cfJCck7q7Y9eqF6DR52K+BhmwRu

4fMm50J9bwxYGrmFRQYhi9FeFLW3JCtwyk/2euHuE6
4VaSEW/9nmJm02nzKwgBaLXXqnGK5EZGDuRAoBvfdAYxzcKt/vMQzln1KrgPwXouAv6r4+tvbvn0UzZL

tBWz5BBpM/wGWYTZ6LWsFFIT78ycOcLSo5C8rEQIYKejludnqxjM5ZUs9TZG0OhMRL56qT/hLJ3E7ZtS

EpeF0wHVCCFDkhYImLpKpP7NZZktbfkiFtvfN3DF3q
iErelrTK66baXURcl0FiEqx6GsDrWeNJppPWArOikOhBB6Q+lhy9lpHsqBm5uwYKtms88vYjn2DLzQ1W

eo7BLcO/zx6Lv07OW6eu4IqwaZ4z8E0fDkwiBBhXh0roSckFYRHJRFMw3Fdz4gmjC0qso9vNp1TZXkc0

bWgWbazN2W5Jg9q+k1mgtK+xLXIZleHcNh4q/ZXq7H
bhF5WQBVvp7N8cBnen8DkpEPkRoy2tMN8smJLW+cf1XbfF5CfCOlDqeofbMoDMhZaEop0/3X+BJJUPPC

oJ5QM9268wL626g+1Xohx+mHp0A+OmRm1vzU05iGRDt35c3imu+ZwMYGLciQEzGrBjfoNOArvOD8UUUQ

goAwO7uA92t9tVpBoDLvkGPG7s43SR7Xz/e0dJh618
pILCzjs9+SLuqTKhU4lFs1kBR9rzsRGKE7ORHVeqONv6ZhcssfnZ4/FuF4RV3IGQl2iQxM4zMXjfxTt0

fUI5e8jtL2BFXO71BugT2DRjDXvQN486Hd5LOqd047pEbl7Vot7s2GoVJYDEU15tmThlQH56rw4xlQXu

tSgFXpjzRXFafbEnxagJDZourcZrie2i2QEhxbc5Xj
qoA4u2OUm9xNtWHU7t1YlZ7I1qisslepoXnusBSZZHY3MfR/qpLlEAz5BGxvb66OGmHwRL69FtqTHY1X

sJlvk+anc6D5fG6awbWwhG0Y9u8cCrMP47BmK7yWGp89M3aJbmOG5ThWEOHk4LyHhpqpI0NZ64pa89Ti

TtwVJd/NacS+1a5va0bDyxTZaII/d18AWaSWaq4oeK
IF61CfpxhXUrv4z0WiAaDslMovm5o5/Jmv6X+gBcmbeI2lXAPptMQ+AWwTn+8ugM3AHom+52dMlNPf/v

Ad2E8UElBXWoQptqVUWLQbYkSZw+XEKEjHgAHNQaeRK9vNv+ek7RcPU9ICozDRgY7D5/sw+1zJ6uheA0

LxCXTNnGnwe2kzrD5VGUxN0WLbMkUe3JnW6QthrfdL
RjFQI0t166YQMFxvNCR6uUcukzQWf8lhq1dkQ8Oe9EPiHDh2ZOICQ0QKwzefOxeyEohBhlwPlbz+TE23

OZpv4OPex5POKMrBgR4oEJ2V5MTqbjgfSApfmqOQ94gnZdNe+4vPi0TXMcFGzTH7dg/hz6YQJb0vX+vb

Cg5rRuPYs0OOVzKRsllhggv6cHgR4pdki6MgvpdQ6c
fmXrxxh8/ygYrNFm2q7qKOZPhzEZ5fJokwNDbU0XyCxeD8eFSldEcWMCfhdlUYDXVziknyjNjpKb+HcC

yvEvi3F0WX42HPPbJPXzSGQYvFJFNKblmwxFGdGhvNi+wimpwMpjvbyyBfZS+drb8U7jv59Hha+nmFv4

/ehss/DHfftexbCgcHN3BxoUHs4ZUNN11lKegnfE9n
qdp2ATBv8IcmyTQJ2dNz1YHRMRLLiIugGgYwkzAPhPJ2lbkd5+qk1GSJgah2tsF87sKqH8jA4JfXAu/9

YTOjZYlNeyQwu3XVwopXGPa3l7MOoBVROx5vq3rb5DNxStphRhvH5PJynqt6DStAfzL8BvyFkQnuBkx3

fAIq7OtYQnVEb3XyL/3xsiToHDgScx6JFp8M4vB4Ik
9+bqevfsbN08e7Sg298IENexVfThfwes0rC59jbmFeNWAksksQDUwCmeRHiNb2vE+Hb8Peyc1o3tIvcK

KAIfCXqCPcPc3dRfcJjuOqESj5UdxSe4Q1pj44c+GGtwOa8dj8272hddXCM19NuftYG1SjdJWNyQO77Y

UmfJdnasSi6DvyV6mdP2X+ZRNcFkz9aRePKVebSeUK
TUF+GYc8d4sRy1N+EuuSIu+xAp4HEI1dVoIOjTpf0na8J64YTzXu9LhtZwZjSXZeZFbsoKaNJAxGmGHn

X0t1X3lDy3t0IbTYxQ+LgulH3XLUM+oB0F/kjj3ShwvRpL/mRXRDXWF8+fHi3F+aRaOWjF9ToTAx+nqb

QBmrBTjd9Sufk0yTex4qzm94hsPr7k3gmUenaaUj7f
h/+T5oGsYGRurR0TkM6i1dBqrOitcdxKfu3Uv68hW5NiQzfuX8BJ2QOVUdt/Q534EaFsVAPvXhmtIYFD

da+bgUXZxs/sGP6tFu8n0aTJ9lM9a5T9w8q7fRShM/E0gzgpPtKiDIZ1unB9HlqL5QOrPof5/0ImG1B9

Ez1W1exqU9Zi1byE1E6X1EvVT6fxKdpOL43zwwIW4H
DgG7EpNaTOt1pk4CzKr2gzXPQ+Ye94F2j3Rwyc+4SooDYp7DbeO/8XYfW6xwLYSka/Rv56exYKfTf/ZY

qIRDKVfdtC47wLyGVPyNlvW2MpLQfAAlg6lqUm+90xeZH+o+2g2H7EimAJgTJe1VZCUuPcvnh+IqnIbU

bpB12touiE/eBEVRczP56X3HcJYePO2P+Y9y0mvebn
k+N07tSkBgRYyAIm/0MtUEqGcW0OPqu5cMKVDEk9TC68KHyGeCSkBSapnJIDbskRPLQmjpLsrw3k8qqz

IpVtb39acUkPikZvqXLCbZgop23tuYTsYPGkxxMlYoI3hlB4rS2k0c1CxdjXf4wjm7hBYAV6Ug1gxnHa

TTvyz/LemGb+Lf9s1j3+KrTtvoWUHgrjkwm1O42NWB
FmAhIrBVf44D/lDR7ycBacgXVrZsg8WHAAce/3oSobv1cJ/x9C3TnSHcII81cpHnc7wd+2BN5WkYU5kj

fODHaYZsb+li52iuHJTae1uDktAKoWd9U5digATaH3Zs8B6OY1fkcbG2jF2pRT7xp8pS8Thom6iVU161

ETaAT6aGyUnck50JqmDukSwJi3Mlh2hbfrsSndrGLV
sm3T7B3++cVWf+M9vQeq5DUjFfHAXXzfMhy+JYt7Jybmr8QYZCoCNWqkqDQBBQkF0yzzMDxbgBpryAgW

AjZbyUMtz9elFFwwsQBc2fT8t5xwyHMhVn+fn1ekSFNIFVezp7z4nEkSg5bo6rnDj9jzmi71ptwhYElU

84FoZaxTP+ozb5RNTmJ87Aq1GRLH/5aio1gLgvsqhH
3PxWS22tj/hyYpPYmX/ZfusQBXvKZXMlBXDVxtgPjnQ4VMoaqkidqke82BmCZ882BeKC3r/aU7W1VsMh

cS70QKpU62Hh+lst/6C/Uj6bx5pdBK/sUrt6s2dTphkR/yMTcIF7Jm9gFFpOoOchXyHgf2/3arni51ON

63uy7QKT1fUdCl4RV2e9M3ed8npmIx2nhO/+PpX3fR
XmvCuHY7WPrrOaPPzKz7I+I7x/+r7cKGPgvOB68uyXIxU6KU2UJwEVRnlQZaDzkAf6scHn5/T5iB9Jg4

Lse2CQ2X9bLxtew9MeTlt5Aqvp/d/2c2auAWjrVi7IZEGs2QWvAa5jyMsN4tM7WV3lN9tEN4qzEvzxra

vAi+DbhhM6bIyde/AiazP0lvIm4Jaqyv2l6dwuGPdy
te49ltvPeGtjN3xbX9cB/tTbFoQXQbWvUENqw2GDhNIjrbdlvFkMj0jCu+vTymzNfH6kh+m/2RLmc+0e

8sfYFk5QyYAm4pdx9Ov0sTU9xaoUBc93WYtdAoQ8vWrMycqN2CEq6JyCyXFU/FxMXThPKe2UMOM2iNNh

Gq5KVSVLHansI8iV1/jiDzU8xy1C/tp0NjPGLt5koL
9e9xTmaiEe9AUNPtEFzsc+8NFqdS2jZfsElrr+scEFpDzhkv8zYe42buo/kG7NYmjUAbSFNtOKaWD/8Y

eNOeeKjA9JC0cjaDuL5vflOXsG0XxeoXMuEFVpp+ldtnaE/Uh9VBYvpUqD4VYb68d8lJ+0XWCMxFe1BM

U8rSyMi5GlBvrBcx9jR9n9GksWoDGq4+j3Vrq6agAI
LG9SYLZgyhY7k3neGuvh9YHIg7bfr6JT7n5qRLmdB6X/EJC7Y5jFiGk181ST+94AYZrXhPSsUbKA8Iby

db50aBxQ92s4a3szL9yA+GVBbPIkQHUIdMnZaEXsiXxh5vuPHIZyprBWOluK0qdEwifBVsNV0PhQx10d

1SLgio7mBAfDrLFIE+u8kPQki+U1lSZGxHjJeCfBgR
DQmqsPuAQSpd+DUNd33EJCtQHhRfTBbUkGtzLqaPs10xaabzzWmS9H6dGBJrLJ5Rg4dU3Go+9ftjUKxx

EpfNyFpmqJDcGsKXuNhbj0L0N7MjCYBqGBUsQw8/joUMDCqXSk8Tn72zRq5ST92Ou9L0gRYgWiUh3YiD

a9nuAPZ7eVy1z9F9n313RJQuA8I8hFnIPUqWGGbkt2
slSFOQW/6VAVqAigj2v7sT+Bb0+JhmR7l4+eWIPX74cuXS/o1bm2ZD1tKLSr4SyKwsjgaoxvwUDeZ9GG

Y+rROS/3hcJ4QHBAdNoeqQoeWuyiXGDwHrRYvSxXoLhUHCIlCutpLt+Mms7xTsodpYk+FdEdjE0BaLam

N0DlUL3L7Q4wtRtzXMzP+OITJc0ArHlDuXd01A17hF
CvsK2yKnznaui7Qsou+o/YXy6QpjFPF8sDbCyN0ix3hHfiip5qUiSfhpCIEx9Faieu5ANmHjakV2THQ/

aDmBYvQUuBi3cPC6R77hVN3z1c6N45K/f/eaRbAD2fHyx9pUm4jfS1NE859XGsizdyz6qU6nj1uawk+3

Kobp+Gd1V5NachK2O+g9adLakai28icHjx7jdZAbaX
RViMGa7qlCPRjB0d6nqegCL00cjorQb1311nC7mqn5tXq1rhwIxqteTPwQ/XV/J9OgFmvNsSWwYt93VA

omk/nJl/eZ/bKNXmWQuXmunt4zjubzo2h4OGY4LjiLtWtKGjX8m27fUhWQg8yzlJ00Hn1GC1+A4OWWkh

XlVjHC2qp6Y/bL01MSew4PkqM5w3HLNEKsocYQvV2R
SOAAovyhssoNETLWJQL7NYW2JP6tVdlPjwTpMDRCsI2m65G8O5UR79gC6FJOX4VXJoWzjSuQLhhUwwPj

Xuun0VUfiGe8831dlUy4sTMK90+kYBI031Jaff8gfFigNhRVU6eY1ERb+1ZGs/2jR0T5ZOLJfCr7Ko7u

Eh/gfRhaEg4NH7wRh+jjcC2FkNYbrGswYiLsr/uOKR
t7dHgBFqAbDolTpRqlFkTzW2zbVYmQE7QIOFJ3oWh2drfb/Oj8nlPKkcr+DaW4LJAxXomJSG78Td0uHB

sEZKIVHYZUPzK1cBgI08WrvD1ic5NfFDqgcBjXMIDX3bpC2VHKLcf3tbrnC0+PMZzWn+mMnLbSWyB6yh

dL+vMe288htBEFeQm0MIQgC8vn9LB9BpI3NFNxdCbh
lVAxNiAHrYfyYYDEPQjKjA1uhXe5fFqqhZT7JLJFqUzuFZwJzE7HTnerqlMWD7yi/tXu49vkalJUZRy4

4RbJaQq+kaaJIH/sCskLHOuBvcS4NMNG278vrw+Mcfd786MWJa0fugpkD8mTU28+D3S84V6uLgnAnEny

S/OTVlfAMdtp1a66FqbJAOi+dV1u2y9XXtLGy7QQ9G
QzNcoAP18FEi3ZRk2W/rj74i9pHW/O+Yv/H2x8JTAuXWk+pk+zGFBotRkro+2lO4bZAwljcvwNY/KzaL

G/wvOSgGc2xdlDCbDWGDNGou+HA2bRbVlElHyE83UJ+hmw9qJwVcE7PQyatQgPT8/DLvD3FPi6OA0ci3

HkxO06TDbrCz+i4p1hny0kF7KTF+QkNt7EYpP1eXpv
BND1VrZZRU5JlZ6Cv7Pm9jIGutWSPmH0j37c6sdPyEO+mqY5t/uv+w1trylvfPNQDgqw1oUSVmtFfVcC

os2lBIA3s6TFqef40/CC2+Lzgpmd3D5ya+m5glttLLPUsLCG1DGR0TYGhcrb0mVRURtgrwp8xvZJ4TyH

dDu/FvvKFIZIsqpNg5XqNiH3kQU2WABLTReMoN92Wc
iueAa7GmEqUmMa/HEx5MM75+8C/zzTHLvcf48CKGz1XOkC7xnmEtVG9WW/AXLLACpeVdmOP7PFSebusv

JHnjkZX3rvlw1PlKbJ3d4ZFLcEUiq/yS+QGQZoz2pUAsOUOVwcyUVgonmTBY+BZcdqYOq8vbJ3v8MixP

QYBRGv2mifOp047LNXsarH/IZ94Q6VVu/yG3CUe5JF
fel8mHfbHhskJsg+jNMNgVZ15hFAgxKvtSF7FEKfeLgaLFYEQcqg929fvsKybOAeH8+LV+mJ9+IZxwtO

yP7pJ+Y42+c926P7QrY42EFcDjuOmCWLJxB0rR6GJHIcnyccs4Zge7SPIfPGt7kHwMbeS0uvxLps1Yn0

Z8TK9sUX2Z+R6j3OttZ+raVWjf9xiAbd8RZfoPg4Qy
Jz6YnL8I92R4h5Omvv9aE6HOf4wXqO8ukGGarGAT+t+7ZENQhVAFslqjiwsbXq6Lcd3jFQ3L2q6Ylc45

/W5OXG/0ionAnh39RM5CGAA88LpzhnZqqs73k14Rdgaq1/XkHkzz4Ye+GQsAw3/U+ce0I43ciGhZJfl3

loun2Eo/y2HfDSP/paYCwm9n2W7nFY6eJjaAqAmpq3
kPhLi6R++nuvzUVwcSKcaP/zn45pT1PBqAccDOwMIx1EjXQaFPS+T3eiRu6AVgwRO36Chv62DF88666S

A2Gm8jaVGTRG9fPy5UnK4gvGw2fzz/SZ4eF6/Y/8slv4nlQ+H4u4vdUgLMCiZbFGtZv5PkeI9i8z0Rfe

edAZXh3T3wRwST89lWtacwCyFgQCGoR5MHk4aRn6wl
urt1t6CQdwUb5xzXulYmUInCj4aQ17VKg6A5ygbelthZUhnn/BWLV0f8MiK2PkK/8nPpY9pH97EoTy0v

NF4XG1gUzAYsKR/Sqvo13SJ3U5roNLW80EsmLe9NF0qq130bYv0hU+33F5usysJdD/7qWzBrGKfGALVK

KaHakcM7YyXwMGwIxtUjrX2iRveyulMH9FBUcB12Rm
fIFPSLl/4wIA7WaDB1hD1V35jfPlPhzO8+7Do9ZL62PGyDBKbziMkEfwmxupYw8j//MfFmSzKxpyKPQ8

wyzVRjS1P4IKkgOv0OX/a+GRvZ0fWLQMB1llReP39U0QH1OR1tOeu+LqI4SvOsGn+D/95Ov4SvJXX2DG

NSglWobWif4ihBvvShIp3YZC4+wFlo6xs3f2/XWbGJ
VcKG9BdC6KKEQ+4lPMuK6FlTb0HoEfQTfIpmub4jc2BgrYk0H/8Vm3gWP+HGwphTAKPvWFng6I/hRv3B

xfTIqNTXJl0yhUNGWRli/D9WOYH5cekWjS9zZP6CpL3IgMiseyTzyAoGkrTE/NXO6thjYdmSz4DhRXsE

zpeU9aMASWw2l//FR6K834raKCk/GVdS6gM7rKjNRM
8YNxJMc5fX9uRoCFlXQA8K6xsTUwufXI+U1E875r6UvBdElf4f00zXscFdEOafgxnmQy7+4tyoFwqCUp

YnHf5rlKzlNCcjvPffoz1XJOoe5bqwCiJm2ry+8yrmOs4IuAovK2Fj/id0fX5JeBuKcEDyhWiANBpBXI

eJJ3dgwVE91DVGWeSerTL2ICXB48JXtbLVn78Wvs3g
jqFuHtCQqrz+5c94WyERl4eT9hvV9ae4ctBEmAPvvtz5wgWxnge3xgmnuvia+OIg55NCYtV/kCn96Xn6

m9W+u669iOMGwcHImuqE6yiZcX1bFC1apFBUsK2Tl8rpelepwAaqTfXuBEkkujOiPWmAELm4/eUlXfrI

nWs7QO0MkINLzb4ootY3wt+ad8LS+bXXMnWJXsJ4up
8bSjs0kX07272m8UjQx8rx8OCS7GIl72SQEa6jGJ9xuqp9ho4mwP/O+UoNRcgzqNFjz4cbMfE/G8n95r

gRvuDo5LdZoBAg7JeiYLRyxgtcA2xIPF6GpkaY8NjrTy+gfpInSAVGE1OJC/tBa8Sanpq+jfW5TSr3an

kT45XlHyHhRK9SGk+onIGlWcyMmZJ3/r03KWb8iSfH
YBXC2vbJZWFLAyYrN7Mv5M3AzS7b/g9ArWjeBnAiyN3rOUtcvc1BbyeRlBlInW8uZSb5VMJQW9xzq1g3

25CYn8jRVR665F3Oxyfoa0VcNwUPS3n5z7So6HqgVBiIlQg2aMJE8US3oz5Gm7SmML4DYrk/5Wf+IdAm

8AovmyVaKBNQAtVIE3I8ytb2BBcvmffdn7s8GW9ruu
JiM86cpbRnICxGywXQ9KfIjvxv7Mz77ps9drFSztwfbL2nzVEoIonIHZWBMSR7giq4S9tRGImSt1XFDx

54CEpE2okEl0U6EeNsG3zJFIHzhHioa9YqZGQ4HHBWdzL9kjGCMWMQy4dkYIL0kOEIn2k3fNGAQBGXXC

Z9WqPA+p87XQsRvLVqbIhV/8ZhX1n8ssS3HjhcvHtR
aMxGZvjj5ra7nlIiroUhSpJB6xr6d1I6lNbN3Pq0eYfbPVOrILBQYUeXjYHA+YZYJTRK+FR/54KJp+7J

rSXBQdSDbsgRqe2tgAqhsw+B9wJXgIYMNgppm9VK1QvFT+po7+OkeOnHC/mLOsDpm1mHYCjUinfS4Alf

eZ96SXGddYGhjYz7d4MgeR7SBLmUc8VWaz8jXFYKAy
gKt7ynMRwQ5QzVJP/S1KEanZ5c25lKK94CKvIV1z5kw+syt/z9knBQ0s5OweYX0ShblwNMJBf6ZQULDY

Trajxakq4c++Yb2XdOv04mpfY6y6rCBO+LlIMg8S/2XNs60wSJvNg9StSmcxxTi6nOT/Sc+eMI6O7w

ERhQ4wEsDwH72kr37Qxl8QJw4EfAMXnUpQigIBVBnj
0e2Ipd0iurkc6ajY20p3GlqlEYnRXha/xtdri2je/O5Xcje2d8xsWBgoaLfClGkn/31Nvm2hGwKsr53b

4ibc4S0BL783KPnovP+/lJVh9lv7kTwWLCdzEyukPtxILQpSgzXgyqAXNSebVRuU/waIbbNtYDPaWSO5

r0Uethz2SzoeG2iWH1rseJfwC3SEtaKJ+Zy+M7hvaj
Vn8aVO4T7n8Pj0H/8tUjd30Ljk4U72lITSw9J1Cd9lk/eMVVYSJnmra6DzMCdhX2CAafa5fyefsJtJHJ

vLDHry+rWaBz8lDLi/uBu2WWdTagMe+wA4X+3/63AFHQ2iz3LrgSCIixGzxrs4es2n5AwCGWUkVNOyzW

u2nbs/+D5F7lt5lZWB6CUm3/kTGHV7zPnDok5mjxa9
6DTWsiatSdrFVwnrRr9OGmx+BLsl0KnN7r2nJwRgdOBU0oSxGdFCx8LVO2IfsPq5TYkygM4On79uHgSh

QozTBUtP++k7D+ae/3Npsp53a3CdYy5wrl7LxUTEoATDMbls9Kp4i5C0TrvW1CPS1i98NLg3Z84W9vuc

+0z8AVoqOh6vAdoqlOsWpg83zbtsNksFHyZQlS6/9c
mCOKkG+/dJ6RmPIisnwM9pRheaTJVvMJyFEjt9nRp4vW/xmvCqDDAQIOXLKi+Om6TTXZS6NET5sYchRL

87kKliHKwcsTO/BBDf49WaTaypQsA61Sfq9zH+L2D28USdnZL++5rLlvtTDeewB8l0mEvgM6jXUFaAQU

yI3+inPa9xXenW8Gb8aALWjL8X5RHzvZ+tWmdQtM2S
kJwc2Jn/FzosZt/I74QZm2ba/oemE/xfsNdP+x51uZg5AVZJw0RN+nZTlH5OKsBuWOyIPIy07w3HBeM9

5FrpPuB90cljTIIlO5qYd2PpVdTaf+I4f5bgXP+ojjG1DNZ8eGFtmxUwSsBL+zlkfj+WIGrR8w03vrT7

GNSOHqNFksU5MQH+v1YWsg/UlAlUFTVrPf/Nathalia//as
0dmeShKH0MzGeMITMn12lUqjB7pgv3R9VKyxsih7eMAubMvETenD2IMz5k6HU27L17rl1E/Fh/YVMco+

wj7ehnVodaSJXOrnPrxMe6FUfqYU5B9rn4lc+TBE0TUWFOSdr2Osx2PZEZWTXPX2j+ZVSveHncLxTZuN

7XQ9oowQP4wJE2AH43lMnPYTwsFGPeAOg1Poe+js03
NzMzAM/VBjsvv/iFXqhDYCGlrUHjjYlwuaDUrDaUVpBsEjOdCVcXlbVAMzDXHNv2rJeRhvzBGpq/WO96

Vsj7Zskur/rA3zLdHRcQnk581vp/d1nYDvG0mNF8mOtqwBhn2qkSnCeyzCjOw+8Mf5f3Q352ZYG3jJle

y4zC5ucjqZXxLbMplZNUv5WBWq5SzrG/CT0RA2akHF
Jv7RLTwnMRToA40cWfB5YwiNFCTvUT3ZOkCvo+xs7v4oSxtsP3uKJGiy6UYmdWDHME4y6G/JORKwROB8

sfOY/Re1cbc6fijkD0O69/HRollrWIPvA3k8Uiuflb0xD4yEZhHKyA1SclO2uZNYCY+RR9zbbzfgcDA0

W+eiHoVC2HWLqQTUvFFGIrLREfVXV0Y03oUKyJGa7g
Ma7q/13ilyJgU721MSpSUu07+xW11PT+RO0hKpeOdgac3WEdj2m0RnErAkrKLojKdNA6jY0wn9/31mkq

Ejr2wqNyKedJYdpxN5sMVoMgR9awlkxjcsL9OZy8ufByGm9qd9wFVrOz8MKZ7HE5Q0L1JUmxOlBmb0RM

D05VIkw704k15x0pGp8aBxChugS1mTU/bB/vZ8GW9f
LnAgSBeyNLxugwbO72QW3+2BWBRHXpj/P/OwKdpUUdminXK3c3FtVCMAT9nSmaLYxyXgysph1kFHofY4

FP/9sS1/9BhrBUBQDElaRo4NHCd/8I57cfzj4nxndYOdDqEmFicy+hJ4+L+5j7/hZ5M6TepJPsUU3ljp

xCRbjN+aKcTBaJ+Y/HagjYYUJl73NCWPeL8ikzOUGR
ZECfdE1A+dfd0llzf/ejJqMrrTzgNOirY2zLwX1Iq1tgFg27vVhKYGLCFouQKj//Kb3ydWd/R06ynEU0

N65lN8Vy7fWfrLE47qLg/WWWNU/CidrB8l+LWo0grv5Xr+GihKUPj6vUbqDaDVzpb1a9sJtXi2RcCK6K

VCbcGHlNaP0cjo9q15M/+E0HZo3SVFiT3S1uwWQPCd
lgySeostuHcu2MQbpW5fA9szhHxdMmvvUa1m4YjGYh+WpFHHsuD8d8A8NCq+PG2fTbcrh9l5DNfJ6guK

013YtRcSwEOWuhUZSev5FgZBC7s8R5JA/CoxUOzr61vTvqdvZz3wOBenSO4By5oAlp7z/TcXttCSBXIA

qMvtHZP/14E19Ikt8/yEIJ2F1x0YGoPeuj2Rhbp+Ok
cbMnXvLWYbqEf8Oi3aepsDboyQpfIn6wwnvdEiaMY7TtDY7UzQfOaVA6e/cwQSllb6VYgdbdA2mBrasG

GdikPaR5geASd1lDggY/gYB3WL6nG1lPdbd7vcO6ICj/yqK+UyPGWeTellLKsz/jaB7Mv3XOWnI+XPcv

7mCddMbzTykLoik8YH/J8/k+ApLP4uz2Ghr+lpzwpE
TrLgnESikAJ7KuTAyS2kyWm84Lufl/GPBPFtq+Yh9keu87+iZZURtRYINLQgquihmCuA434ruGu9yn5p

91Lj+S2OwKhexYm63/4B54wa8yh2LpZHRTDu0jJqB6nJ1rrxPBETtQl52D0NsQPugu7+XSDKU049I1pw

GqlZCK/pq6e8saNWvRl/buobGvi+7vjLc12g6VkhFs
mUrQR7McKkMUgm39/S+D0vkvjWJkbj5Y0YYr4Z4YSCI2cEIqYMrcnckxnSATItNnh1qBxPHY4i3Ky/uB

HxltXoI9Ls5jIBdOvlEs9Xh5xU0l42ia+EYa4xgFbJ6SGPO2VB5S7JR/c/cTnMdigx7py8Amp+fFt2Gi

YMhjDVaHKdoPJnz6ibS51e2aIdYwn80301TM6rz7Z7
wB7DV1bbUmcucnw+/j1njpZRxLnjJC5/BMMCgTU686FQE+ZSVcw98BDcuBTmL8KDPLuCteg7z0E5osN6

hmMuDSb36eqylBZnU9yfYFbD85y5BmyfO0QgbGpE+kzWDagrmO5J9iz1Qg+wQLy1nu4SBFBGI9C1M1Ii

QwKoms9CTeEx2hz/U2HKQcD1fYGi7p8VjCVp1nM/3A
/COXy2AWH5O+SRhYKlMWuVu0n9AeeMhWtS5y5IWbJBq/a5ZWOXJNML8qkUsiAIbl4kqFtbXmHRMui2G9

C+o0s4j5FxtdROSK6PqfAbzZbfP+uzZve/xBBCdpAotzDlRkR+dN3ex9NWNb61X9vaRRjVtkkwawJSoO

cwSlWx8pdNW4RSu+ZXslF6sarHhjbU7bbWhxoGLhil
fBTAfwe8F21Xqt72Ny7WtX8EYbh4ky/ycD6f/dAQVCHTpf5fYTYJKlF4P7wNmqYIhXfFVHQRgwRIy585

p0XhSgNReMbNnHtPxQF8qdvydS0G0eASBSGhZodJryDXiMNtEaHoskj7p+sxcYT4ScorJk8FTNUstB8v

QWBTXxscLyAKiBC9lYuLH+euvi779TT/60/QDI0apj
KSr89H05Uoew9la04KMpdF4TrjIVYpux4zhNPD0aWxAxfgOe5VQLsuCtHlOqyj4s18Cb1cjmsYVsjJio

EhsDLLPf6pjjRV4AFfZGkj+ExAG8J6y5peF4sLbdeYea0U7UDqCkuRuTXEAHdoUHQDbOhCILOcLb9/X0

3Y09AXA5L40rK7R6a48Z4Tx+OcEDpQREcLnIFy3/WX
vNhQma80t+hds5fsPKe/0zbLI79jbpCLC9j+Gg3ICF2ZTlJXD8CuoZy7C99NJ9k5QPn2OEQOC4qTpV90

V+zqN2yN7luU7v7NWacaIMZF3GhyqxpAaigAnOMmAk1r0JV4NBiF/foc6ARELFbtOXwiLjXkITvxfB7X

mw1035kY3eJS3173Iskx36bduCA379QMLsp9bi+Ses
rUdBRTWfj7jt49RkO5KcLUIk+JnfQu2x51ff0yUndkrROV1t2QpigqngoUAlwi/tymgxxpHz+THHmX3T

45P+PCkPzKo9m+DNHpv82iTWjU+FYG9vjFmScAdBrhEtmwiIZ/0jubZR5B54x2LO4g5ShdfySc2vUNZf

/hoRUl/1sl//sCp16Q4yL0uTCyi48G1I1B8HlBwFBi
3wtKOxUgYvWz/x7SzWq9u9uWf0CenJYsnHimqz9JsSslQCV00D12ujsxLPUtznyhRtYkgH8UQ5OqfKZI

wV9IVoVIcIe5muC+f133XZOmehULG0e9W1lVnbYHXqMR194k/vGj7WlfT8d6dDgXM3P2cvqIjGsum/Ii

m6CUILEBVw9LQfztnrjO7udfemyLMw438ZxFKqpENL
SvAgX2whuba5D8ZWHOpxzf1cGAkFiMxHmtNfERc4es89NYU+kQXQrvyOtEw6RF02V60TzJTYYi6v8Qol

rd+ZBJEk1CDKGRWoMdKT3TGjtZUJps99Lu34hyuxb2blSIwzqns5T2C2YR1IqK5yQXJ8YZx2mzw7r3CL

xjJ3TM4+rMeepeTSjSMZbsapC02nPHgiIUDO/gjT01
K7wYyIbRYLrKW7cVivLDeldcEeiWEpUXXJjPd29ITcS+gjHakf9Ro3JTqsdcJKRsa+DB/7j6CWrGNHYB

PqxZTA8fxnV0Xv6D9pL3WwFJ5gbPyndJoW4HDX4EIBp+mgspWpy4mJWa5M4S2ei2t4MmWNChsRktW3P5

GYx/rMOAgMiN/tQEa8a4HpqVFt1z6DJbs2dFSV/5bw
Rd5nh6rfIkL2W+REuIvxKMVQUlTXP3t0Kx5LI8usIQ1TJA+d1tPl/37iWx7K/ZYv2O0ne9dwqZd6lQ9A

ymCS1G4yyGYctty5FiFw3fa3yim4SilOZr2V0i9vgrZPFlqxQIhA8NKDfp8A45YMbtBj+seRRgR/2+qE

xauz+TTrRyuOoJMuMa5gM7Q025xqGu2LZvgeFEOOEQ
ZBCnU8ag5eaN7gNQ/NH7PHldSWkERe+RwXTESAsNRj1aDNQMXtdUUsbxkkcuaBWtFN0Nfv/2So37whDB

AMHxo+lKlNS5EawcYBg5+u4STdZPLoFszUlAJIMj1wYYx/atBXypgLfp9mX/vc2bgOGQinke0ObnK9Sd

6ap0kAouL46qE3TNokzULBFHb3xRQs48goqOjwLsCy
COtx+iI//yryhLKISwgx2G23Taeevcmd8+OI+33SFFNSRxzFZ8qcY/inmLXcH5n1Ks9JgMlT45CxIrgh

bpmeflNF5M7F17c7nEfImocih3h6Q6USADsJYc87a4dQlkXNjQOYQcLoeYKICOfnfmu6hV+JrOUhd53y

QVY3mAzzHWko16Io7PswDMO+n6seL0/rCmstj/V/CR
JOGKLtKR2+1ylhjquxMOlvqQdifgHvQ1Lh5lPIvm4j9uj0Rw/kOWzXcdyU0dIQZwpxxSXpk7EeWrpnST

IjHFGvl/4R2X+7HCQhEQBmvxb8LLUwES3PC2shcT+S7neFRpSI23KluHhjqKphfibhf2sGAuIlDS03j2

9imjQP3vuDrDss2CgLGrGDAoTPiQ2XKox1bg2gJUC3
QBsgcMmecYT69W4ykaIprVdT6VbHmIfLdEu18s8+ELeUAVKD1Df3+De3B4GB9pn7BIEts2eJjbNBH7nm

YilNWGsRCEA0dV4xt4F9rMcsrsMX8VzHhVbQrjqAwvUJcUjckge9h09UIW41OM6OjxkB5lmkJrjXQM1D

CpaeM+q/5+Ex/A6TNCpPyU8uJJlzjHWYVmoAIrOq9o
sZTM1G5BybsFyLRw/eqU1P3LBDtykkPFgnwBnLqg9xeRGe2QEkokD/NRhx/pGt0Xf1ujTyJNqxwuvYKS

pu968FHC+B9OepCW5CP6Ui+TImKkMFFXGJsXEZZfbfjdq+qjSj2h/sZaKcFiqCUeMiJJEvbkRccQWCYz

u+KzMQmWVQwUkMnRPO9fZECs8kuGAVsoIFKcL7cPQz
GSZKIPirvVvVjRQGBc0K0XPxOoF3nnKAy/xif++R6iPLOxIF+13By3JJ1J5STC6INXeafwhJKHFNYt9J

1rQL1G4DD1+7l5SyCBLWnIBEf2RBg8MiU0iP6RIvtjC+2ya5m1zE7TgRCSvsukb7Mf+2XB9bdJNysGXH

wKWyzWsCglKaZpNxi3S2UfFENuShC8JjpY1WGfX7CV
FXAi3/S3Go61CKqV7GmLDSBs/ukVCU9LoOM7Rq9RriqFf+239YcgFTiWUeLhQKA9Wf6fdHj6onXL5RPw

oSvTjsjN6/DZgtcDQL1j1BCRZ/XBkg2Sk48rCH+j26HoA+f3abipApluh8Wcx64SL+r3r+RhzcWF2570

rxGg2+z6jWh/j1kpHbgHQ65xGrMoYMeXCejm61IqYI
PZ1WF6iv2pbtOkCF+4ERGV7ZV5nbT2zE6hpyGDzV1ac8aBr+v6eUGKoPmoMK8YnSy3UoAFq03J2LotIW

bWY11nNLyemAiJHrDpo26pmZeK9lrunpmNNd48YYrQ5kabypMfvP8INQnxdpGOWSdhoZstYYtNcgKATZ

EIaVDHShw8iX1hXLmXNUtDr1JE8DelWyfQdAW2tuj9
SykLkzhOVtSSAfsHtU5x1GaH4TQMMPN/MrWcIE9+rOpnMf/jLCjTz+nb2cE/ZGRXef957+rxK7CCtgv/

5sqq/2S3IcChY5EKR7fixGl1WVoIF7wW0y/g4mbp1WGUxBzIedI/IVGJNW3LXuudDDfNkZXQ0T/LXoIJ

diFeBrxr4UTarexB974CxjvWO4MQ5AiozjMHMcsQpK
/qBFFLsFGSBezhk+TOfx4qSxEWxfHRabuwk4uQ6xfB1Ah1NTdFLmzIJ8UlLlBAlU1LaZKKJUJZFECzqc

sFY9lUx2sruiywVduai7ommMEdhm/hqXlbfSmMlf04IiAbY2vwDqSD8xQlpigDdROBQu4/JoyE3qip+W

imxFRk0E5Z6SBZAOh3lbKmHS7a8jWh3+ilIy60qYBQ
q3qnSLxxub22Yyf9poyHsQG99Z0/dscX9avhzodJ6on2rq/yMh+J0E0Q2n5Tfldh0sf4aOQulZ3NPXaZ

V10QXTO6GjJcDo3PYfseKBXHivOskYm0CK2acsX1sOK+MdA5YhDqNRAUcYIHl0Uy8WH9HvetAp2boqJz

k6/qd8BBQEfHqm6f4hvxqAxWycgzz1MNntWizmVtI2
8BIQiJcBbUeDmmcGvabVv3q8W/iPqrlF49gx0gxONjuGnZk1dRtcXBUaUAG3Gz59UQ5Vi0EcgGLn40k2

HXaR+IWoiRnfkR5OiV5RAoHt1f3AzGDNtfJjTOaqaOrSnujziCU5yuBZwLvmb80e+hb4JUiupjBFHLpn

oLzKNQuSOZlDtiSM1NAMT0sh5Z9LLwo7qhUhoIOqs1
MwDtagmflZnp8zbciageen8vTZbuEeomDyeRMxYQZxn2hb1dJ+3RPu4dRUkZ6yoZxZtkbvJhYmCPJ9wC

8xow1hKb5VXC45sxh4ChtNm3AW1tR0d0gsIcpMWKbezCepumb1GAfWCnVyau8HfDkV0tZfSZgroQvTHA

cDphkEJsoDONILSjAjfVT+VDQIhE47RACUANzirsns
Mp9Jmb6uDjmja/k3wkob9Rhgcvsy63lV+aXzvkmo5jm3cf8kF0hG2BsAvACY9kk9IHekUI3FRZWlA9CM

llKSMgeNpsDaOXXXHts0z7Ykspp5QZrnlnL3Zl0F2nySNKi2MRNxDoZKi5LMj5qUUDF4ksyNnRPVdow6

8Tg86FTXVPEBD5/ReGSUUC5K/bqhgn3zNHorgjT5Rm
gaMHHI/eL0A+ixDyuYj9zAuU7GbRP79Lk8/dLwRqgpHU0YdNPDss+LE+03cBqa0mTDE2Z7FUfKqAxfy3

gNw55YWX+eVr1xLZFpIF0qETfqXzWuxWdPZ2sJ9fja+mmEvFa8lhuSGthJQHhCVLFhbHl8AJiMXHvGOK

uRviDYIcjbbHamLWefNYAw7X7lH9u7+Ay1L0oCeerP
iDf+Ar1+y72gyR50xdek71ew1w09rLud3zLhWu5QFF8xfSR+/rYFl85fVe/BxdXQw0iEliJsEknzAPdU

T8vAoeC6tXfmK9Gw9iCzZrWppM5vuGWUwouuL564REXon3B+NXez9uYQlWZ9hPj7kkjV24eVcsFMgRv/

Y01Egs4HR/O1YBoqHVV0JPQsr0HjyzzNFKOoL3xLdA
6X+CUv6DE5dNFPs4nGigs6MUA6DZ8dO2256Kbe0zQEBtkbbyQfG/2dFQ08MniAno8fJt9cD856JUn8HW

fQijkiYn7APpL2k0lDRc9l8JwWAPq7CX5++dlF/JQguoC5RPZreT7m+Ccd8L1hrg6elvm3O0AibKAXpp

JbWhW3uDn3AJMeCDycLWGsJlMw7uQ+9DwHrzqpq1WV
VuXAIK9hq1y2bmb/2cCkf3xvp2g/0bTNb6vKoTZvcdbJP0ZOtjy8hZCem638QnIMslgxn57qt6kWOp6W

iJyVbwiCh8Ii7fJ4Q6DI5kuqF2C5mq+YAIm0CCeRJly/+A75wqRqaQ11bvfuIjPsAyzs9qVHarV/ilSh

PJgD55bQ8E5GG/Lhu1yHoL25yA21fwF2FCzTaZ1hzb
Kw5srlPlrUMvgrAaxxqk8MgE7RBnRL78y7keWkL26ZO+2u5n6MBvyYiwBuCrp64gL95nzQMPkNYqp1Ci

b7kKH6rMYd/zANxmmFFsBLhtTcZFlfFCPMsUoC93mfOhjaFAixHS/ZVn2+OKKAxbzkGJ3byfqHJbRUZZ

qCOv9OPfWuVv0zLG3T/rDzntXLAtR7qEtyf62pEUpA
9MD1aeqxbXuQ9UChJAkwg+hjSKpg5uYnTCP4DjDPTjEQKQa4ugEVgsphpykbxkSZNbm0m4OzdkQOcMmD

/euV7jyjNgbM/2XgWgmgXrmOJDm40ZsKijZolkclX26984yJHrFKT4cfLhmTJWk4vTVAV/rNuUWCoRJQ

HzSCOBJIylRdZprQD9wAsZiuRR889ODbnhLAw5l5+d
v7kYf+8PcDuZRuYmjbbGFrM1Rr7MpZP3Y9b+ZyBW1+Ng4I9dnPEWz+WIl8M6TRUP6MKoMAkmheGr4Ql6

qbCJ9Xx0y9UdHrAhFDekcnPzCpu9MruGvt9ALNiocHNkL/Z03qr3oaoWS5+man5ydGDGNSjEQ/AJjcoM

1NXHtZqX4LTDKzbzjZhWM3/BR7rQNoPa3fWmQDFDKJ
6BQ9xKqcnM4vUhVnH/GYgVRsV2F2IbcMwCKkubwObtJXTpW7ENTDPPaP92ce16g78XzY8dnr+/l62pvl

h03eO1q8hNU3Tc2RSoTExBfJX0S3EcDrY72pHjPrrdpf6qskZctyV41gy1jSjIUMX3i1/KEksP093XF/

hDnUBaNXOPIA+S5mpHcoB3EZU9cDYME2/Rh1BS9lAm
eWACW51IibruSF29dEkUWwoZlQce/hdEe06/5ChNlqQgP7b6cY/Pud+jYfC0eKHfheK7A8+HMnOd1Of/

0oY/b+q6H+2swfVTvXaz8jdsyVaia6Edf8kzsNBIItx2R/TiueAbC5rtjOiqJS3KOqA0llc1oz5OGs0c

k2N7550/Cvhe/Iz6YGRYf8ZPyUpk99m8fGfgHoLnAy
1Yu0YD1zEkmjW17eL+41TBU2vKgq+itScti0n7gfp89btbEj3jQRUR/nGr/mffH39+u/ZrxA9d6SlMCP

lXuF1Hb0Yh3TUmQq+Dr7a21/Ioru5gZMSXJbPg3jllYebCP2yp4YzFcRTRvIxtztF7QfDolEtxnFI/5v

82tOX3tdIgzvBRpMXWm1oGSk6dmoU307dm1aF6JSxJ
kpEf4uJfmOM12p0NTDKdxlAmUVZxni0zesVgGQra4586pgCVIV0cA4ztFU1phCdthHCfiXZph4ajezqk

35bHaNlDp+FJ+4lyO2EtfsTk/XHz4VyWZt7AESb9E+vaeSeEyVEm7XnLhA4ct96ON+ZTBwCljeh/sHu3

a7U+WimuBDTo/2MLiZly7l+vFB6+lLq5CXKC1n8Apf
nM5Oma5qWj31tdkty8GKG/X+i4GFzQ7sSWcTnTry7K9fo9uj2GVp9pe0q/sdUVsKD6kZWqDwTAEX7nJB

8BbjBN68VHofQw3jv6mC8hqjDBs/aSFi8U4BMqMDlsV/EJy8ruU21wvT7HY10ivKiW5UNb3qWvIPc88e

x8inc859n9xLOgc3G+MDkdce1wOVyF1UpZcFqwupzo
VMdieJIk1fgL98cNVupE3Hf0hep6T0/RhcY6ZyBcuZSv/8ml5rnOU4Gmvk2Y4t7e7cqPFWqEjGwVxzjX

oNTAMuF4GN0mPHTv7L507kPhmCz2wJEAayd92VLghh68iCU5Buug2/JYVHeepzxD5/unoA+VLcrczg06

1f52Gk+o1nlxP5YjgyR+JPJ5H/BV5oOljHnhlF9Hzs
k0SBVLc9bxU28dD1n2oG9q0ODvrOUqzyYGoVCJMhqEW7dWVoZgJt/+wTyayYxnqlywUaHQI/P9QLlQQp

YnNdA0o4A8wpQb8qE1h3GPneJsfvSjBnEu77hynTJToPHN8S9W1AJSEB+++RLReWQxmFhrK7mruyXPKF

BxGQEkJEeu+550hRFILzARXGuqVYhX9lbEYOBEPGTz
/7yybXlw2Z9n4/j7480avRq84ki7/ebsez77dNWJU5nCdOsH6CtDRDxWNZ/0wUo9ZW38gZZ+fHLdHIXG

8vL6BJT1eC3l4cX4ONGhV3CbhA0rS3unXgNzMGFAW6K+KY48Ha7APzX3Ys6HyPOKF/gGj44GsSXaNEXY

Mr4DnQeUVCNR0dKXWkBePwaycuWGTMSPBf6HRQFmIZ
KhVDX4a2K/EulY2f1pPxrnKMSXMzXbTrbPYFr0GbWcthziIxXP0B30j5BEg6/C914iYZFCcOsLJYTvNP

4bf6Flo/iZeQKKq8puV/LtJ+gQfAsxA0Y80cJQ0xc7VDqrLV1AipPlOVmLYKfdgl1GevcIsvoCbSUkkX

cZCl32JqvqqmI24Hoz9yaO486b6Lq+qKPlx6OawLI4
TFCA+1TRv9LytLWyeOR7ahgR0KkyiTi1op0r8qUQnWupOWo4/1lfB2sUF/qGhBdtcy6ykcRnUbzwTzFG

qirIAZQhi0t5Tmh8ixfsyw4Wnq6lgwfgwUjJiYzE6w2e05gRhjcdVZwRIPtLXiVMZaquqEZFj5fynA2+

N7fHZ+kKXersYgctpwZ3vsgvDOiZMn2jP/yT34NJm+
x1Sx4Ns7ktWjb3I2ZceKEpkbZ+emPfJUEWiSU0lisFfjSvTDAZ+dRPNAHYV2PIL+IPKbwZmQTVz/wIOn

OJUbH4/uJUmEBDvTwIIgGOlzzVHlIstA7EDjXztqX9OgsW2lZhrtAuNaSTzZvaviTaoVHe+UsTC/uDTM

t+JnZyyM4/VOzuHZHrkX8PcPaqRAYO6KNHS12KUDQz
arT8g6IZcmc5VK4JMzXZ3vHqW6TkOCau7rNzqO+rB5akhtLDPtjINH2+bQUVglDRvzt6jPy+n3+g97OM

4QGiWpY8kTM+hyvIVBx74WUewqTn5lqUhaojrFcXcShF8p/Qsb49RL2NCTHKGbveYl/e4mty9V5kpzLD

wJWcm2ZqmNCNLAj1km3biyWk4+6Fy5szoB8vzKnb7z
6m7c7N00P0h8GpHdzOQm03PLi1HA2XAokgdf/xn312WQQw1mQ8Bp72gZhvgppYnsfkRJnXB1Z2J9L1+5

M/QUWUC+Ya4loXQvnnwWzrSRnIvj9jzKKKjIZOHvbvShm5NawjGzgEFmgTKPJHx+JBy1CQeNzCnVycYG

9p+gpccLpw1AFQLDgeXmw9KPZHhS5YdFw8uT/q059z
5VisOVM37fA6YLnThaCTYeofJKxwc6HSkj7iIVj3NxkSsp6mcY6SihyIQUtkwjv8G6i/zyFePhea6wxr

sfvdBSt+DilQjsSnGIQheVIA7G2xyuja0vnv4nQRraLoxKmjV3uA8/gTtQt3DTjGAi+hXQhSs5NxSM44

PcW6IFabzTS6X2VIpCvuhBi8NOBOBH3P3NJkwJ0Bg7
A0oLftj/1IP7sXksQpGu+4eNQaXSJLn0ImqjosXN+5xx5n6FRUF1myW8QasEON55iLyOxpBFf3cmRIBx

9KkpjGAjsr8akaYN2Q3AkabqpeqsmIyeBTS6hLEzZFo+pwcp0n9UzS7I1nMzk085MVLw1YBCd8rDox7w

xuAJSK0UxnE1npEn7fuCrtjw+fKxvstCiWTagbCiBr
Ta0xg2qMmhv+CabMgp8GzrtFHm3fqO1eXr16tQ60DOyGzjvS9YbOvc972lP/NDbT533/4JfWYdliQJhT

WcfImykSZ9n7ltQObX7S+XGsEVOz/JRmfYOlnle5WhdiVFdRgRYkjvSXNsoCBztv1mY0ljUCTQ14Z7S4

6m3p8YCAuw8ey2uUUrbi/nyMvo88kwsUBwQBdBC8YG
0822k7w7ptwv/VflXZx0FDTsvbQV2hvKR0OxGs0yA+4B2BeZ46EqMzZ/V1FfbaNIhisq9hH0of5EKEnY

ZjCv+yXr4+iyJSSCP4OAg84jrPTKEfJQJZwcizMYzW1VdAtkpI8kZQaJdp/bWTjT/zh/rQ9XQPytA+F1

MIXN8Wcl0TjVBkj5xM8lVxNN77lkawSKSgEZmaFYPB
gRui9RTxyN5YrCOHBrjzW0C/hzYzQ9RaSMieNdPIx9dnaSeKahCIlzdlP7JAb5VUQl0XRHTuayVjeof0

OAYW8RZ1UjDwDJTbDeB2Bb+DcluX/uT7H4YMEZM2ip8sKVxhuI6aGyq9tXr0ZizlEXa1vYIi6H/q8Jj2

AMoHY+IhzBgd/13arWAyVBEHL3n85EXaje0lIZ5RbK
qNlk+HlU30Hn5ssx3DspmyA335r9j1O24sTiX99i1ptm5ldLa5id1cEJxY504l2XifSEjFDSqehmhDty

Db1q0JrcsX4wzbvPf5cJyXIJMKryIEAnNqFaPSounmyCQNUqzbv3Gdzilwo6qVzmDMLTiGsXy6GSnOo8

6DYJNc5t/xR9Rl12zlMyCVsb66qiW+G6Tv7KfCEeI7
YG5RDQ8IR/5id4Y4ptVRXDcfDQeuR3VXVmralind0HQ9dzRNM/tH45Ht7/eV1Va+SUYPakWEJJE4Yvjg

oxc+cOPO3Mk6lMDl6qM+V3Z9M0GqRop4cX6OcUxqI1fD1HaSaaUHpJfUrFKiQj0Ru+YQksoHzE15+5P/

A7aDq+MxHqIGFNLRakyLUvdwUiKPFDKpXVqgvHHfYG
4w989mv2x9il8lniWg54ouNbP8v3/J02NsxwkB7/bBgiRYTub2kv/9nwNnQ4D1MpCx3zVwZigcoONCcm

Dc6oYH44n1UfQz7EQ4puUIQcDl9QrTzQoTWHVCTQqt5qBiObizXz/ez2+Gonfq6frbyQErtoBpyzbEwY

r4306uGx4SqFTaN8vZYR5S1/lMq3DwaSSNEbP7TjbV
/57+J3AuZzD3obrua6DNg2YBFBfaiuI+E8GjuphPBELSZiTHTopaQ25e4Bv/9f/uVf/uVf/uVf/uX/hk

MUWWqPUpwc5yTUAAMpjcfMmBDNMKNiEQINyhjV26z35TL1Sc29CYF1nS3d6nWibZ7yZemY8sAYz0ogq8

3XHc7YxLbT5p962ai4Vmqs0nyvYtXjjwseh6msVz9+
gJy/hNSoUU2AjYMq9yV5L5x5yQcDc9gJDZFweKjn5sXhJA0FhJlKXR39ITNygKXMdfY8YSU46bEJ5jQI

iKqN+mIc02/7A+tuD9f4jUwU1+YZcCw76ZVXeOjplyt1r9QAl3lA7MhofX+AmW9ZFuE7L2JEQzSAGp6D

Ie+b515dv6NFMbAyxR2ATkC+HfWRYQKp0CBN5XxIPu
wf6uz9R670bTaoj0sH1MCdagPa85B7TGp+oq3/qINYTfndCLZKTQETYRRuCzTTjEfKQjMuR76iFIMJXj

qZfWn4T9X3P5DJVzlS3s6FwCN7l5pHxb1OElaTJlmKyfn8S/xdhZE4w0cn3NPbTf0BSWdpaI/xodlBDq

s2s5sa9UZ8osINSqEPsuA8nwWndfGA59E30oaWxdNw
BCRp1hddBw49QHaoBBvzi7x5QcfxqJgm7KTecVdm+enSud+f49xEsWBetFgo7WrzGazdGVqqK8fDRCS9

J916ix+6IpRYXkoyJ/rNbdnzNJOpTFboBLJB0jdqt3iuoJpcPbN800VY3iuir9NRnuk0Gbah7yiFTohX

uGZW/3e0qFPvWJG4dIUxmuASVK5qtzk35IEPJITuGz
TLEBCrBuigKOQ5cAY4pr9GBJRD3eP16/XfnNIyNVeGxfk3D+e8NwPJKb2eBpMoCdIlbi6/28M1UifxKl

fEnMRnHDV/yc+ikIAKYd+5SIg7LdR3ixgQpbsgIu9WVjcR5elhWPfAaYYKnBl+5OrSZjLapUb9xQ35nD

WpSbqe62YQXPx89y5PCe49N2oeS+Aq25SsXiUXjFIh
HkwHI6SDTBDj2c1UL8LlM59IU6cs2CCt8/QG9+HTkoVOlzz4JpWm/LNVIoNEmcnoXUG7eQwMS21s0Fne

83TTbZ5vNrXG+Yn288sN100Tiwcw84Ghhq8zmpXAR/dvmXoB9Tkcs512PYbeTK+cz++fv3JISmYJ+EhE

M1+zYdXNtsRW7docTUEo3fLUlbHpPM0+TpfoNINS37
uilsTtF/9gtcxrI5M+hucSMwHdX1S8MkVoouhxI1ddbUy2l+WGgM1xJc9EdB/hXgrLVzJjifZCKa8TBN

vqsezvrDIZXUDmNtTr4aaiY6q7fL23pPz0noo34OrHBf1keAF+sxAqmxy+57jVxh6qDkre8P5FHQkAgP

klNXBJx9SsGNS4Rl21nVBcsUZnD4/C35CdRmWbzU3T
rnDHcuk/s3Q10kt5MkXJ55yVZB2+50F75qA2iMiJr3J3rUOPxeb78sX2zmNsd8gPkPWfs7Huk3w4m1PF

mV69/8bZKh/6bilsVmZNg2Qvwq0DwwQYdyYB/qWIU7pcEtBB7WyJ8YAOcjdnyfkrmbtRBGJ5mGx44to2

QqckWrjjOW/l7ICWBGEyiyNTqplD/SvRhqG8WFvmlv
jvPC6OL1ZjtWbs4cSmccuWEbvc2X7/+uLOhgMbLUTOS0pICZRzBVzGRoXpzpmz2NS3CbVUr7tBjWFb3E

NfNvzG09r1C92LXafkjuKkEVCR3MIDJkivJokNTkA5Jll+jT88B6aSvBqUIt+Lf/94dX46eyi3e99vdt

X9NH6fkZphmyjXlanRLbkcpOG7VlHfC6QCLuQydm7e
6x7bZmx17+y3TKgbwN0FcgwGk1a4Pd6bpyq2lduEgwHt2bkv/kjq2oUy8NIiqeJ09jIbqlYKg/UhtuG7

/RB5PYklH3hqKVdTLINMTt0OLridaRhyeOrk2mI7cP+qzfDEfVQjDQzG9sOUSXd3AQxdr9op0CGPwKEQ

Dk4MTh9NRCc6m87lZtceg5N9f3G1A5j8MmhDQzmZdv
9u7R0RUmYJraV1mnB9IgODRfqha1wdSCjxFxTfhb1K+9hingBvG7ZPlWQ9qMhOgr4LWTkcjOIbsNB+vR

YjZi9Y9FzCK/+mDdIWMM367Iia+AaQl82+MvdJ+PFHj2CbtQs8U4607I5FZGQB/6m1yjl5DgeVgvfTx7

DEAFwIA0qNH6co3eGHPL56gqG8DtlvNlTsr4QE/k5r
8G48FBBCivEgoJY2whluVXJXRbKRIPde6wFIdgfWhF7zsXAKr4P7shxgn9Ysa57Y264hsWtyj9uLr38G

Z748sTGwsX3s7ywrOWsHL8Cu075a+lrlBmhX27qSkYgxRS+3m0/J3VTn4IpzLOUvWTmkja/U4DmhAZL3

1Y0B4TeuI4qZsnyYjwV/ezWpoC5IEQovYFaa+4/ZH4
yBSWibhg6AI4+bqtorV/Sz+/HEV9VsZ9kNOkE7Bb7pxlVZP+OwG8rQSjlWmcYj/1K1QN+pIFXSInvRfI

qn7VYpZynxuwmRGw4sujqNQtR1BuyxkFdXO9wfGT6Q+hZxdr4ulvCz3/cDqv3xNE9UE2CH+WwoXNrSPA

L5nbWwDIqnwohoLdcFNJcZ+j2pwWqdR1zGJVlZF49l
5gv38k7mmG2DJ995V7lu6pfhRSUjt8sxevzbDnPE6C0MRdujn8nuul2d8H4DhqCImpURgG6J6H5Mkbsy

LUdnQn2ZGVjO+3bdc6kaoQdDnUjjhnnfA9KIoHpiyALzSnVvH2wu4c0+5vz8F6O+Le0/3eDzXqpYffAv

JAGhvRnfQZUSXE5nyGbybZxiUvqiOgbwtCpwmv/jWc
00E3VPwUJAz4WDGhUdEZTXrkj93XZNIDEKZ/JQNVDsdDsfkv+kmigZ4Vx3ywvMceu6kDu0u5hFtkCXxI

OPDGE3JoAw6A3BntNn6rDQNe1Ty5pUUCfQklJ75VVxG2J1rzgS8mGVZSo4rCaXy+oLZLysZ7xYGuHsaK

z3u6vgq2GJd4vrbSDzXo4R5W5FMj/J0Vk/1dUompYU
dzmRusDz1AfroDN5Z1A2qJvXvL7OUrZqpob/NqD0BWliJCRXM/71zkrSXVm+yrWR0mX47akqMcOnaCpX

l1+ryBHdBDkJPPbnhxh6IJrgEoOPJnbw11glNp4idI5edUIchMBhMOgSRbvpANsqPDBCtlSk+fE8TPcN

IVghhiyGKwPvgF2XgVeVFjDamLCAk9DnQpdEYZsiWk
7x12GMna2cIj/Sn/fC0HRuRj69P0lJ+yDUcTnyhjpAxksHwbUVlp82AdRfHMqUTyVsLYe8E1KLAXsFIO

tHwezpgaYfvliGsa9AA1dXKfvWtGfKdKDtqQ6hpqQJDG3UiM9dz2L9fl170hfr7wpXvdGfP2ep+KWeI2

AMpgYtMyHPO1xDwxFLg1n+PCPvLgSzgc4cvzpffqJn
yiQnrLB4RC9U8zfQAn5Bm3H/KYZzzR+PeC0fidDnPZCtbAoxIaQRaEGc7z7l2WmrVu9BFhOrnK3mMK2H

8t68kZ3M5zrXaL0tWP1wDwL+z5+uXoZfT50w/0smPbUHvu43R4T/PhoxptVwIXl0RRUhHZysvrxMpMRF

QlkzPijIMNQonMMwhD/+SmoD8zMmGIiBCSSDtatdxj
fsUfOsPcHXwUBB0YzNGAcnNaEpVwd2h3M5UTVxbSiXgMDJbt+wWFPKYu7vx4SKZE7xVPnRzb6Jg5kOJk

32g7YONSC9cj2QOLKHLO3tgyxHJA5Cw8HS9ijzg7O7lkYoe5ahFHixQ95DxzzNN/JeymHqsSF2477nfp

3gZ3r/5VZOHuFDKlfhytY30rAffF6lkNuzUvb2KhcB
glmq6k3l7+E87fvuJbnrOhGNcBfmXAoJUcn0fzH12eepdqScV+yTpH1eXsBoBW5+NQboFfItZmCLSibF

pamwVFp/ti8LoZgr7uA4EeWNoOR799s3r+yEfeB+hORPAjmeSq3MCDrCUieOyAH4HlK7EBQ3xdehB6RH

ZkMr/FIwbra0JlNCp4WHQm5bznSmLE6HteQgL/zpq7
UAemk5qsG1g8CaNzuR76q8a3zfRgN2EX0/nJlw2Dg9J8+q0NE8xG7YvxjynMsQSRwbMhekMshAlrcj/H

prAf2pEyFrosMbXDjtzMpMu4YZHH/ZvSV5UU2odLlOpmeRteO3szm5me+FHTfjgqTLy3/H2NogV08tCU

1QOFS/38DYS04nGhT7x/14vbOHN+GUioiIPOIVoRPT
FujXQZI+pgnFYoa+IZke1ewC6HVww8R9ZMzcmVK6PZ25QkNr9I+T6x6Os0bE+No3IEayfFYb5baEfyjX

9aVU27shrPAcX+Hl/pKFRqsRiNvhgHsw2/Qb+u5Y1TjDBLfZ2kVgcGNzz0gbUOOH0QTXpxjq1fxy/Q+h

YLslICg9CGanUfR5cl9TcLPOTo+qRuW5idJHZo3sjA
VzaWoQ8JhMg/k63veqgHGfYkIpDG2VvF3jSTPkiGg1nVutxScixSnybYKXobaRcIl8ldNPlXpEtBqNo1

lsfdE4vPLfw/vGrTE73xILCsWcZqgyGYdplg4ot0w5j5rZkLOIje7ax8TLLMO8NQJETwjZSRP0nPkeK3

Gts+1X07AV54vesaQhRX0F3PChmBtJnYmt5i8pkyJ2
JGN3w3sqttxRWZAJnvPcrKQig/G7IN/W52tq0I/JKJ3ZvitvsaIZIfIyMvCpCxGukNHLycLaD6s8Ww/4

L7Wru+UPhEnOvCIix1A7R9ntM5n+uWvf4AKBOed4X2l8FGym6WPZO3QiV7ms4x3/8SVXx4O9K+UxZf/q

C35Y1rNkOo2wmOuESu3G0jwWpQenf7BzSNUoc6rjdo
9mTyFPVZ8IcZaqu9E9nrf4hhfuGyPx2HHtx120E2te31nA3V6YxDQEejXZ2+U0V3sVDKg7Uj1IAza5sS

3a4tUM92z4omFieKcPNUA0Fn0ZUs81YJaAZOGzXXTkCpdfAwB0LnDYcx0Cd9V6cooYW0vKruWUtnbJ0u

zvTNT2NOaRe4pAANDt7+QSeILfCvxx9xXWo+Oazqj3
sQNUuU731+NWlBSlf3maL0C73hm7Osm7P6R9C3/2xO+p2vkbp2rM3JzoVYNV2M+7bt9Wf9ECj1+BGTWl

RS34AdbSswa2jdvm4DsAYjEVC/WpcooJw98hb4FyV/OMQRQxFgrLRMwUpy3zfCDbo5gDx8UOLFPAuRlD

uE2tkAFInaRXwyh5YQbPzX+9CwcG9RIuvJgxHrBFKy
fatiWB8Z5y/9TkEqvK//yicNrOM/R9Kb4tZDl2KJznYRlPHs0L1OIqilsZGZQlqxXip3Rrk9znCfukDg

MS109Mn6wOprSpsxLrHw90QrO3tnY71jH/8EOi7pDgClGEnzfgdFiL7tHBYV2zovLnJo1Ce9corO6Cw3

nfsWjyJ7vi2QLBtpTbfGVVwBQEewM6Selk5YpgRKWT
e8949Pqd/ZEuTQAR8cSgQ2k3aeXLEK7s1j0KhkV+hg9qcUgbi0fSRHWwbKrfdmJ7ihY1j2a9pDm/DRSr

5CJm7A6vR4MsT9aiJDpRrgudrWzy96DbsIueQ+WRnHQyRd0Bgk/yaknMczwBkJqaiEBiyC4ZPd+TV+rs

HVXLyQqP+1X82r4khYV3nSnvhNVWNTgdlkbWX/jQPg
b1JDslU83oJ4bjSIsJ8J0d0aL11xJeRPjUgljkBHlS5HZm2x7WSOZ2hym59T455eX5OqmOSf/VHyBkMR

3a3bkeO+Hxbk/4e//I6Yud5a5vjqP0j8CTM6uF/6kbcx+ATq9GHNME6VSkkwNacMjfBS3tJeyI6BeE8R

tIO47I6VB3kyAHSM7g+KTF+I4scET4NU00uvMPIp4d
zMrkBzy4PJrMB4B/MIy5OJI5zKHiMqN+9/LQfZRnv0cajR0NFjfksC+Gh1Q1uKI+r66tAIfdT0qHNgrg

sbxCH43FjpXBFmXOvS8jW+cB0vQF6e28fzeEGKmnxFHrHXUSTrj22La9d7g/3W3I4Ixr5MOWYgH7sAvr

lZEuJ8CCUG7uRjPRQnN3jMQsJMBeXmfeHdfM7je82c
a9kqp86h7ukVbA4W8sejQWfTJ2On9z1sxBRyMcqDa20qqWHLtx+PwaghKv7n3WtdV0gkI1Z6EySu28hk

xV3zIHoh6WFQumHrS+iMXrzRrWIEAmPO5ps1Vx6j/LgkA2Termf8Wl29vw6U7bAjKCilEB1Pmgp+P4oG

KVMMyPk/xdqwI/GQ3YEpEkYb/GgCYcc6dnUNxmceEj
ptLINXmEftewUVn43vbx2EtUrpigT04FoUbx28Iar1ps+ic/9Ami6TWWR8How9Xm+91ZhKJrIedyFrzb

1cW9pBZ3Zwm6odavJ4oCRuc3Rew+EfPh4hIK95zXphzxW43WwtfCyVMrlrCyvHbLL7eNNCrJDdP//Ci9

/us/iKGZsa0zF5W1d8etAusd9jR/rIJG1LWLQ2MnEd
V9q0uYNglUZjggmqoT0Qyl8FuaTaS1/hElokzx3a48H1jijhSLlIw0R15Z4QSmB0H+EQV+yqbpUlvlfb

2UqNgJPmCYP6R/VhpH9rXhbNVa1SZW4ucVdu+rw7nDEfgRaMGlBZcBIvz7RfOaJ+yWd77oPa3rpGSrJ0

FEBXigBZeqceQqerMdciqMgB4XBhvcJok6IkpFU+t2
T0KzNBTpDbHpClWZz4RbcP3uxRtWm4ecKl+xpWba+xXvJXGjXh+fntW4bISwDQwG7q/tNCpHYUnqXGxU

upMFYfIUIGRWtPfeS+ncup4uDl0javkaqSvhM2Ut6zK+e6jWQ7gqCjqCHea/Ue5OOGqryK1SFCUfeWQh

U7MYAbTY6PqRdx2iKu/MTZ1bQ50jVwcay/lxMCDUSh
/V+SjS2m9+xWcHktPx0N53BHG6IISGmiTB7KATsvoY2rebgdDGMEWPXTwDMOQDz8JWWGngoWNbZmhHIz

UNpix+gJzRxpAgJTjChvx7IxiJ9yR5uhDkRulzViAoHvJOTNkB/qElRMGu10MR6OM5v7/CNIRiAqJkCE

BQGtvLWCZ1JrzuPbhr+D+pazb7Ul6Z6ftj0YIy6fou
cweeuzsb2xsoJDYoVL1MjZ+bVKsrxgR9Mgoe/LHSqIXfULlkfLz0hXSd2qtJ7vU+YgPcpgvU5ucOnSKV

M3PaxdQi7FlhixW4QLjeKs0fzUfR1dWAvwlCEuWzMdqSA2uXwmpFsRy524Cmz3lhcmvAlAURo3ydEuBM

MtBfBFZSRW04AJVsQSf63xEwgdOYJ92iUhqjD7+qxg
mcUg6gOTaXZhUGGV4MijrE2pQKVY77DF0VSA3z9t8Eo4ggBkF0S5CpJAsTDCmsNC789BvR7WF0mt6XiW

XE2F1aD7meB1EMzVl8B8nZkpCCJrJ7mEX4WIKzc4E0a+jmTtiSDYwWsy3P5yHZ46keuIzPSRudkC5nAf

tvPeCMUZdO7Ha0IWlJ/2lUabmYTmFgF8VtYX5MYo1o
blifN/GQ01fzLZ7r/QpVPRMk8t1p2vE4IrtrUnb5fOkLbpN1sxMNaifS5axZ/jZpjh7PLQ4u3Ng9gtCz

N2lPQpa9Jo9QsEOL1qdAtT1jLo+tONQ5s9MsaswslAfyrOrc0kQhtJMCMcweNQBAzN8KaOBqsUHiTbdW

DopCeUEky3u+zD2t9KBZ/KklbFM2FmusUQcRaKALhA
kSuVV8RT2dTVnz2+kAzvw0JxHZukynevyK0i3H3T5Uiv6+hao1Y8pR4WZWEkEgVomQiUKT8NF1eGGDpJ

ualPz4Y5i46MIGCHf/j4YqvjDmlX2qoW6zElIZ7oZHyF2CMixwIg4B8J0BF/tAOyi75ZmzwpgRmreoqw

Z9IFLrCOMqpxn8N+5kovmpQxtQ+hJzEg71j0C04z3q
62KqWyhr0YUVh9iijQb+UXLkNmEj2JzGeio9vg0qDA72gjb6NnND8Y/wZTy1pIMAQ4iI0piT84YdIa6D

uvaKEb3iBb+LfftazdDhsb98u4EgmX2XBT8uOdHz9a4gpSQMTixucVVNAOlW9HXmh9V+cMLl4M0l9nXe

VsF4pNeWAS+bDbRpgq9DxaN10HYYeBXlejAdylAxw1
fMDa40bBUaXceTWuAi511EjZDgnilQfhryyygWxIxCPFnZbZoAjs/+DuiaqggMPaYnhAtR35CvCRgMjj

JxZj4P0rA7ga46Y4qK/DmiIdIvhKVQuQN5dR1jBxz61bMgvcSFTQM8M1UcOiSsks7lHvloJlVrWFalCw

Sgzsv0DfzPtbdCrFW73ibS+KEpOv5PkQumCd40IYai
Estevan+sU8vHsZwoFBJ4OKSZWgYYOTQoaXqubntnjRMq1+Q/k/ymCJZLyiya1KCnnxVAvOyfbDbnrFnl0k

1m8E2hskSoUev5fcqdGCP2kvuXAexmHfYRY4worLpNT3dSgZjZbV/kPutjiWysbuZ28VltlOib9areN1

tpEY5Vno9jwSgG/pH888Yuuv8WB8fGpvG0bNr7FdgI
gJqUHRW9Mf4Zw3XNXt75cIXS6rCliygusqnmHc2WEA4niQ+RW3bDtsvBwYF8Zsnusagq3kG0LfxfPka2

oXQAWGMzGBqGLMaWEK028zvrtQ4Mwrh/IccErSXg+dfH4qIkJl7MXA2vmMmCIab3npkP+7Va4E035f6R

N4YZrSVMfCbWPSrtluRbxabG3o1f2vnSUqdHgsuQYv
QXNJmT6YjF2WjWebvxX1//KL5/Oa/RgsozwumfeuYAMNlBF8HiGuFTvHhvEKB/AGGe4Ez5YdbOlqNdUC

8LKuDfQAbLfksjr0EXVPbVR1tMEE06BXP05+yIOYS/DTgUHrw5iRpnG5O1gkG1CEHv63CHe4gXbJ1+li

0AgzaA73jNuMmqreVl5kn8uDJWvVJ4RaaBfixROXvV
/u+p0GVWeejJLwnm4QA6DacWkmo+SHIVRjC4H2Gfir8ldpJFUtvGwAao9W0pVMZjDkc5Mg/4+6pH/5l/

8PUb1Z+B+LUNouSXdchgIfsVPxQN5TQX7xm0BwTFscFZIkQwyWMQyrREfqQLRqBGKoAE2BMUHoUYBflV

NjDGLlCKSjClM4FNPrR9Kdw497chSrmsV0NP1BQ8Xh
QXV4UPq9P3ajDuHaHJBqDAWSJr1+Ax0JDNOtcwDwUpTuIJVjO24wsBGeyVFnXsBgJBFiCj3+Cj7bahMw

CyoQBOMmBSTsXytTJEiwZKKqO9MbHAAgLp5bpVHuGH9WUbDIBcHbOZVcKSA5XPWkNQCaQBZlZh7CtUze

GCIhWkASAdYbJSM9xtRddC2azzLuUzcZAJHlEXNfHs
pPFBclVVGgI3AmREC7AoW2YV2GMM4aFJ5WnKflT6E1ZsD6yRHrB0zqMIbaX0G4kLLfI5eZVwmzX0GkMg

rlgZYgFYwgYnuaiVRGYTSdKGAqC7EMSPOcM90gOR4qV36ng7EHjJPvTQArATV2cEYbNtuUN1xoFJGrJZ

Q0XzQfHZZzX6s4HNK9YQPwAlp3v5CpsaGypBWywnMw
jOZ4Zz9cqAZsWF2BEtuf9JhTOGFJzba7UXFjEW1AWsQEv8DCADDTuOfTcwYLXPQ67ELfZBcWUAECbaUC

tCHGGFPMVlgZta5vwrYwHo+0843wxmz6s9/w6ht6Cd302za8l3t41i6pkLb54k522uWN8IiEHHRREkPI

LasGDozGJj81x3JwzdFNfcMO6pDWEJwsF5WGeD92Cc
rN6871Lmmf3O+kHwqIiEpISHDyyirKiCkIiUuI/X3GOPf6GIJSTNUZZoTVExg9Ob/iqv3FJDdN7hp1Om

fnT5XPjrHGmdJ0DzASSIWUA/a2BS5qHXvdEWIyKPGwn8adI8UqNiLYOGMyZXYWMjRwwu+1AX/dSBF7qN

C1GOQWhrrbm65mkkrLLnao78rp1zPhifyCCg32IFd3
TUYmZhbW+qg0Zg9mZVhRIawOghz2sqvssf3lLE+KrxSnqySZym8aq0g6Jf5yIh6z9lCl19W36SRYC5Zv

E5M+Heeceo6T0x0rVx+/fSYzT2Lzlto9vookpkFopnTW2rKkUpV8Mp1qKCQ1j0Qps4howL1ebyJTOn+O

T07xv8/+xosAQEjwr+Mg5kB7v+bTGJSiDnbwvJWe2L
7mQY9J6J7PIftnTFPzd3p6qNXX7J0vVXq0hXULo9UmlP60xGYYVhG3A/l1nk2s6w+OWOj/FrN/EPs3Xj

SJCkFHY7EQZVfMVZ5WSgjTaypF/sJf+At/4S/4gp6KREc2GmjIgaC4Lg+JMe86J/nFi+fLJbB76kGFOi

xFAhKQRRUXZ7ljJ8rylxhqGwe0iOXC0cwfMpeuEoG5
hATiB1/jcPSrOladw7tQTB0L4sV/BnTPmAFgHWvZW3Z3Lp5VpsRgZ0min0s9H8yEZnvJfLF54zsX5WDz

E5+zcvlkAl9UHI0ERn3qc+ToJEzoz9rKM0aJbWE9BO7klGitdVrSRDUXxdQBEDusou0ktdR9fbu5M9l9

ezQ1vOQnJheatj1nXI6TQaL+9B2Z0SaumnWrAK5Zcf
jD9JBwUFpkul1XMU2jVOyFWCh6JvlLbq/yX2zBuoFecxWz+45IlgHNCHBmknCcbz0hKmsBj1FsHK325t

qlvrzA5xRhHVnwI74IptqwSjSC1yYyUvG04Tr37kCYVnWtnqtNGy6Gl8THQuXq2b4PFXjVoQttK1aqjY

aUXUsYiBHDOJd1Fw9Bv7vow8eLT9ei5u1110SSRrLz
HXc7+lpkJG4QTOW6ZvHY5U2ORVMY2F55gSB/HL6oYD99G68G4Sc5KonxrqhGGCr7DFyo1zYziZJmBq8L

TXZpjA1Jsgzdo26CPCUkHNODl65nplu5ZvSGMg45OhqZHbLTRgPMdM3xmbiTKWs8PyglH3+nAY7S+AWg

qqaErZriZ3m10qW701I+rFcg18M89UVdFWqbpVfgms
Ak454radnxIfpEnrQk8jzOC1BUM82DB1CWcK5ZlLTivsck4TN62O4as/Lucia/QrN4MihKOO6c6YbqOKBm

LtqzdpVwqPsGFKMu7kmcgvX9Uh2uDSHBWaQ8i06OFqENPNDeK8agfUy14AhTkZUcJuFeWObPxZtz3rkZ

YfK0qSc/uaWtZzYYY7L8Nln5TuqSv6V+s7XT+W7QG+
8omQy3HfIHuEhinSPa0jF5vj6B7hKtBhL8RoPGc8tCA30nvr3+uRW5xZqXZ4qcKnV811onW7ro3Dt9Io

ef9ezTnYPFEFZxRry/WP9zTlOl2GuJif1xa8dhHcq1iRst64g2eZn0dGL6A+skRko5lKqt/N8O8U5oE7

/Dproo0elq7e5lKNFGWv4KZ+nBBJivKBYFbkysDutF
O5IyWW1YH0hHsa9qOs+vnZ2mIo1Eke3YWCBJGeymm2fv5q2d5NW4wq2a8RHY1UPcA8qHs3mxLuAVHn9m

cm+Bcthqw6P0iZ2o1aXEZosErYyC4tm6mFXrc91fGxPyu9Hk/oOOiKA6X9Bejxh9PtWc61Q26BjKs/3z

lDZW8H2XwzEqGSe+bvak72l23CeBqtncY5rQC1aiIZ
9RmJVKd+OR/Zd7fm1Qxk3LjnkNvfV4l0sJtHiItIQ6DgvT+/0QpRlH20jr04dbupLFu3jjHxYB+8o/z9

/LalLaxLfUD52xI032j6YgRCvigeP3HbM0Nt1nueHrFs3loJ9KUL2Yu9w98DV5kkBaocAd3iQ90P/dPi

94BNld9A7nxht37E+5GnX9rew5YIuR0iblWKkVY0ho
UKPW9eca2W3+2B9SuGcqZIUK7pr3TaSfM+4ppYJWC/6+gm8nn+XDo5niEsgUDUYs1HHRFwONeIYWhTc3

ePSUOhEmfI3uhk1/2aM3JDB7y2GKNyalHaUx6nb7Zs/y1Vx6/OMA2I4YyIHeT1Tf6mav/wj93uG0HKIZ

VhdAQaNa0vVHE67WNqu1SDaJ2EHqKF2DulMcHSqxfd
A3mOHegu6rAv0hOpuIIOcfwN1563XzbYjk41wXVAwBNAm5JW9TMfsU9CrNz+flEpDiUdSG+aFRtY5fCN

JwbkZ4HVG2zWp9Odik6RQdt9/kbhkm8YmOtv4qor5DWjlwRe9+/o5uXAKT+h5VAJAcCRg7BtF25Ivvci

hJGK56K5P4g4dVsRp+eQ2/dye2jWhn/4MbTPM5BUzF
6VqE2Bhxg2xzwEU0YKeylr7yUxY7ah61ZngncNBSSci++36b5ZqvhMhhmE3vaSjsFr9QbtvFA2gJC3GT

W5kFkBmKBY83n12dzjat6D2gOMawpOF7CJFZoea9vNDy67laZK6WVJGbNBE9assqjrchUSEEqz6LJqeE

UYdWCAG7R1NeRsTZWdQfaNz5y5FaVR6j2hfZ5L6SpJ
apQ2sGPWGVYVhA7bGuT6L4oU10GqPurQb2iLLguv42YvrIqSyEfbmuh19sUmKrItCokpS349kdPSodzh

W96h1B/yt3bGXtoFMxN4bN+i/DL7rnQJ/rgXuUClj4oVyTtuaM61YFIl7daQ1UjID9IiRtF8U5yrFPnF

50lsg4ZC9zVQWPknHvtRUFfvrd9qJxCmXsk99wm6JK
H6UY/MbQVItsJC2bh/wVQdvodpKXwJCBpIouO3m/OKfeiVNzHJxaiRSRa8mgF3UvGy2a8K8mg4WmJUBD

2d77s84kbtIpTu8DLgLW4Ofophy9nk6frbDYNd+hmkisuE3EBVNxbzBdVQd9G97KxT/Apc18arCWnD84

CSQMM063bo5iyegRvKEJpdHOCGLvejUHcXkpVBtwXJ
4El//9cVoer5DP7aDv5lpOlPj3uhMw+cZB+jM0HJ1v84vaQgWPZQ7Wt0hGd2tPlUeeqDvxCsGnZ6RHcc

CsrNsRb5j+jqQ0Apvc1RObCfOS0B2Os2PXTKQ5nqIdrLskjsz+MQC/3k/aoxIQ9sfZS7n2IbrpP96Zky

v/lCcBH//BpQPTowENiySnkN+TYrkuNhaT9iaQSYTN
1wHqivRQI6jiBurV23reqtlY72pyrUEK03QnEdcyIURe6vi7LGUMzw0MLOucHHZasZ/rzVFBj+lP4l81

+u0l3JHhWj4Cj0slKfgA1am4A9lfdU2P2DPDdoW/svzIlqYs9Aroa3/iGAnvhLMDLh99AHt8G/E6h8Cj

L6R3u6KLcFoFfh/EOiZPgRA/Ya8c64X59bJR0+EWUA
SW4N0khuL9ew+8jR3YKbuI19SP8lfSCuCN2PdnkLD/N29iAdS9Gpoc0glRizIMI+Zvs26OPjSvIF9XqM

7oN4peOh8VFTfpmDYVLYdDT8Fuq2H2Q0OsKV+3sii0FbmsMiv42P/3n2yrtOIlK/po/zX+pP0qD5TduE

TAF5jcUkVOo0K6uow525rEifwi+RmS3Ta8raVBsiLS
sUOMJw6zPsZf8zagZi/aBXaCdR/aifufKcJ9rAf0T/nGc7eox9FULYWOcSwn/IyhyNKYb7Egw4KWhXQS

D3IStLHcEzYzcIgHTKRu4Vhj2EaE6F1cX38z0iEpesy3RIK4xCTQD/M9sZfw/+P7bS/0dqA10odO2Cm1

mX5II8BBO9W7Tuiybqzm2npR53dXW/o5Oa3o7QHa7u
86YnsetjDtMmn2F3uR313MKtqeMF+8sTwfL/qV73LVlQJQvxIFln4vQu3AcWL1fOPmDQ/HN9DiZNuPww

81A30s8I+q5Qepj4rGBvxfrqE/NtFO+b0r334uePBJxKSd87OJLFBu6al0tfq77rPgXvHz363U6XecqA

gYOHTFk45uVv/6yN+9VDXIkSsAkxsutx/v0RwWMlnS
WqAH2PkLsTTvP/rfJyv+/M0eOlYYEzg4TOOpefrUpDejbZmpYz/rqdruNlMs3Du4PIMvXP91MSriq73H

Bu8kc7X2rLEogjBEDuUgVNmkMpYwp5p1IvKSnw6ZlAYijkCNfhTLD+9o38d4n2EmnFUSOP/bcFBon/XN

Uua1PgKlUe2tuOWi9gEJSLKOI4rejrOmU+GBQy6IJ+
tviPoPCrntAHR+VQk0BV0u5DxtzA/yGS9f/AfAa31EKXVa7eEm8pFD64SuksH+Mmd/TKF+aju7VG3pdh

xf8pA/KX8r+U/2C6h358Eu8e/wEtfOAH9R3bt1gR4+pFxY1Of3JnEUXjCJVxuBHtNGWAPISE3TlbbkYy

4uqM1RxLqO02sjwANn6QJC3cCQEQaD2CE8K3KvULPI
WfmGteHs2A02mHLh7W0DrklhQVonc1KT1CuSBjYaAezY3+3OknKkHMYg0yeanvAuKawqf9F3GWDmyF6U

b2SghV4xhcq8vOchRg8wh9V31mDkaxjl8w98CS+UbcVm14hcy0ISo7wX6PlOrfsZtsOiSjrFUf/OPr7J

OF5+9UOd/pyBOMiTmx6SalBhm6wNbXuHmBTDBN9lcw
1QKIKy8ptBI58UymY8fdcoN3v1UbvkPsfSiYYsb98CPe5ocphOeh+j3vyuLEpbpIDOwrzpB+aWAHlzH9

4+akuVGDM1LKVJY1qoeN9363USIRkYT5C7nVo2V7bF2v7WeB00dw7V0kBbPZ0IABCYj+Paln3O6Tl86l

UKm6YcT2TkIKtK5MDVtbbSzxdhLmr0PxwQrce57QXm
BzcYMH0EEnZR4JJgH+WoiLpPjCnNiKY7e7TEHs8SONf/ZmbwJJv0grVe3lqiaYJP8kYlVC11f8HhvFda

TkelfDQZw2jk7kG/giC+/msznDS7W1/apNsniujljyHM2ypi0J3At8KSmoGB6orYUOl3PzTWIAOE16Jl

NMQjlkSAqmYdr0aQ0EkxWTrp134HSHgkM18YWgbCs8
6Xyefmi27VC/d8O0+2Bg4C7Fd43+EyTjPX52sdTD2OLcgaUVKrQwYPR76X8OdUxY/LQR+BGz89tt4Wia

iJXzsbTqkOUgFqW4+nY57W/2n0vqdr6g6DLOwBVqmnvzBc+pf/IMxDZgPadAvjoFLNqp5nxz3hKkcUH9

N+d1enOVyYYhSp5XTWUkWKnuf7NdIFh1uRnIUcaaHK
XiK0z6be/S5ndDj3mXV25aKpE+MbjbPMAibmtK2s370wDJLHYRTgdV4jJhK1hk8pa8GnAzyOD3SS/qkQ

4eZ35P46D/4pq9zxwA6RaK0FZd8PEkodoEZA3tslzLiPee390Oiyq8i8ovt1Kg4ViQIikyQpc6GKVcgP

bXPyb1DqVZsH/wiWagL9FFBlqzXIf8qprSN1S09au0
gxp8PDTT2XxgjxWSNv1A1f+ctiI9NsP92wYuLNq9Oep1p40eEGjlH87Etp0p4vR7M7aWUnB5ADH+1R0p

a86dS4dQmbKEQg8aSqnNkiOo+JiqJuOnWtyhGGVcEDSuHDoMR8Wzi91tBTY954hLeVQucFZxMoKdgCn0

uzmPLRw64NO4gi017fNRI6P2W/8etQL1e7WqzJqo2S
Y2M06+W99RpEm2otA8WhiL6G72TG+sFP7rCe4yfFtyzCo98u68/O76iZRL0lqPrpjJlhTYSrjMQ4t89E

YfbrV9TFrPWNUPI4sR3sAFFG1Zf/f4sfmygQehxbnksN5umDyEjyoWngGo5rMztkfVfU30S/13KQWfcP

/Yfizmk2KxGzjzjlGd1ZpIrlCEC+Q1ffBFg5WOG+5k
C6ux7AaoNSc3CzBz7lWFMBhdYVT/+KmfdPBT3DCZjqxR3FkNRyiCSkN3MAPzNuTP0o697YPgTBvF53FO

xzC+/gzpMPvPw8Rynko79lZ12WgBc6g0chcg7kMMv31HDeJ49mECY9zWjQ2dY4dCpJvtmRpktl6dSuWL

Nf/euGr3OHkFWQioTZ6DtEtHDT+hoTBXBv+Qz+/OV7
c2TtprROPxpBRQZ6MZK4l1QG/2IubY4kze9gRb1Zp4AisMcAtb/qO87DIEEpMIPstpUl4YaBHxtCszyu

PfZatB4D+9V6AqV8A987132Lz/1HpMN+TnxKcId002XRW8hv6KxjY2joVL7LQDP8zqZ8Cjq9Ranl24Y6

TK9CdHgGXJXmMCn+KHEYjP394Wtrmda7XhdDo0dcZL
fK/Ji90cnHAhTLHXOECa7XnS12VEy4HMY2CCKu+mQmcQ8qwGRuHYYrzWx2PUzNcgXNl3p8w77ckbzVuV

3fZd6oGs9l+0dNwBFufM9Bygp9YySZSY2HDO7bq4u4gLIs+mK76n2LvHsU5zLFBpSXbueU8Bz+mYQnoY

3O+NwEmkpCBAXioxUBHSYJlrapPDxlnV7DEgansM+f
7edDiPtEvrelkUyiTq5byH7N7LwBkYlSTvVJv23DVIljyoajKKa03hfr6lazSUaOXYZEHyPq5etD5H2N

LSvXeBYZ7B6JpE8sysG7uAL756pr/4eZ+DC2v4+mfc2ZGxuOgokRtVDOlnc84C0J693kVam1eCSTg/SN

XKymEuGeq5HVnEzjRhfnedxYQfhX9dWf99lFNzG+6G
yg54zkwcq71gR8hZt7brc4pShsGUuROlNOKpOK+5Pn/+1CtO+1yJg2t6LhmwEWyW8JlRZOc6PSxEV1wx

8AgMNMDO6OTYK19tpdQ2XsHZabi7s1X2emZkMxiMiaxltVAk8PX1x4xd/RSujOfF5FrVa6MT2JWzABpH

8pj7QYskxUqsxXg32oSgQno/fcjTMEovTmgD+MFWB8
xo12VBnF0WgFwApGgIHitRJ6+VFDXiY4uUifGlrvCQnwGDaMBfAUm4ESIzd0ZNuzd2qbJE9Sf8t7S03Z

YL/k+PIBMcgElMZZd05S/7938REjKDvukOVZlbmuG/kT9uoVWdvyd9VUpbFbZRYKVN/GCdedDtZYNvyN

wt5lu+nTgItsc0LRcVt2G/zoED69wcGA/a8QKZ6yjG
bNRWrr23H/8bxJ6SIlcnJHwOm8DUBUwHTwyEccg1CBNfPwyxESsGbEKToJM4Ig2tZgaLe4ZWTR169z78

7bYM9yLrc1twDUeCtnvIJ3ISnM2U2TZKmoNQsbe2PGVA3wuxscSd/geQUsC8zDI63Mlm/b2/8I+Z7ZnM

tLa4mcXyc1DmiuAS/Gffsn3LeH3Zm12KtQzqDNyhQw
BStshCXpcXwT5g8jZsAZK0FvHtCel61lbcdlItZ04aVLK5PLZKQNFqvMmT0twvan0ziaISn2oqAUIDjV

gsAXVjYeXEYR8ZSiqjAblk5oZALz/xC5CHXkRfxA+4vANLX0wz9OhRzmhYoHYQ8EXxzoUNht7ygzkrtL

9bDF3G+Uc60oOY3hz8BY1dgtP6kL51O97VR/qY8yNT
kD0y6mPSHJ0XxwDV7FUcHQGfAyBLwjXpD/xXNfzHZcjNOftJOa/Q4X749ETxWtEkzsXB/quVWnxAh/m0

WVjrxEEY0PEwcHfQEZTUo19BW8ZEVJB9lZCV/nbL44qBE9M7N0EHdDvJaSNRfcdKVfPczZ1yhbjG4kuD

WDbE+PSBPGyNj9B6omjulyuqN9MJ5L1iwENKIJtUWJ
jM6VIwQq6pQ/Hm6N69eit+CJ4DxP50oV6QFc8AFEDSih2+Nuk2bq783dWsX5y5x/TjdpV0LljDhz6LMY

m7J5LGc8PmhvRYn3oQq7bBE7FNuENsK+eyivuy0UQugku75Qz0cAxSjPGDF3rqhfAaOU0pqisUy8zHqb

g33FInKDNjmpPmNfOSjpHG6cLtOfzmk74NvLxMeJFn
stOeXjQubmFzHrDmtPBKVqEo4F3YoY/oqVyeoCrsxKUdxvhvv6egkAW3fckO2e7AWriSIKWd/qKCNWT4

Rd4qYztdDSR7wDBn4Bd9R2G8YZpaaMCBqdyMM4aZWU5ro1h8LvVHp+NnowRQyMxEyP7XYO2iTSniV2h4

8fI1/XoXLrW7a5kqzlC7P8CPq5SEyOQ09rmtJoWUgK
W2hhLDAGTJQ/8ppBFgTLMIu5icrGemocUILIf4fJn23+iKvsVXMU34HQIzirLH2sNS1rAsnx5ToMyMzI

rCF7+WC3LCunEsE2qc2RGYRSAXzS3uVLsUHum1vZ9f19WJHSYR21ZZiuyrJ86cDFrGJ/wGegZ9v5jCYo

UcJsmUga5V43W/Bchh6L6tIqsp6VnGCQtSVEOUxU4w
LyoFaPsNoQHhOCoB8A8Hu0YFof5xcmTOd6WnDPvRwCta/wKDqmLavgs7cc5Hki9DS/+ESxKOIaPd6uJm

z6TYk8SYul32zlkicSx/gMiEpPuesgB0kAx8btf7wyhOsjioP3cvmu57NAuktojPPwcMBCumLqISHh8+

nV4W4U/p/nSXRvZOg57JQy1eqSt4LH26nF2JLKxRqV
/mpfNJfWyvJRJHGrqU+Lewis/nFjhcngA+WVMnzm30DEw7ULxSiUO8o/9kgTxi5VSu7GR3wCkZr08IynIU

noOeMJgyQX568RE/f8OsWAdawHkG0LXJMBnrCS60++TIgbz847g1wqvBCT7cSBz0XbmMddIAjF7/j8QO

MADCT8FeKhgHMLLdyt7UamilLS3GkfZuntLP6u+PWd
QlDpNV5mgdqLMHQ3p0PAUglGFW0tnyj8E/EVNr+6JpOEZhjkMO4qN+05ekYYjTgvNZNrXlRmZpM3CObH

ggLaAu8qemFNtT3iCkyG6iSvnlkr58mr9w2HebowoUOIZVHB2/S0qGpKzEo2+61t9tg5EihKvOAG13EY

s+zeiqxd1wHPxhFcw0Ub0ZuK5KcYagFnA2ESdl7Qj+
ZCefqLaTM9jlwcR9EwOyubWNFw2UBE0SbjamWryryzsr0Yb9rkFDJ0hwq1+nQz6QJDS1HrLj8/y/LmkQ

RC2ZfDLVszGAPCfppR6/h3OtRcvr4eVSJPxqd+6HFgYVHT0rNcYIovjFqRSGQt6FUA1jFirfLhfS9iDf

EB1iGcHYMyd5GMaoFAsPLoylT1vg/FxdypuKX/Zx/Z
EEHP6RdByBJW78YD7JGQZuK/hzNBhUMnGcWxcsFGa2VnRBcgUkmfvB4Xk0hEiFLVVX3zHaG5MnBj1pa0

fCMNNuiCFNhUflAUPeroVPJ4PuGTb58ARF7LL8aTwFpbzNjGK/pf3dfe6o/LiQVQykjoyfb8gvecD0x6

10Tuqv0hrAvWGDXzNDRfwW1Y11Xh8ktkEhYPbsSeXA
a1y23MYa8rkpek4EeA2MRogwhLbOAB2jc68y8rzzAirtygacMzKMqi5J0DmKf/IBaYKtuj2rOEd3jKNl

czQJYRM/n8OuxgdUglzw/8zoi4er8uhpNGflJEmg3EquvKpuuXofRmRIcd0pU3pktmCrKqmW6Cze1k4x

7IKx20Z64EQaWlzZnSEAPWh6H6nqmcB1rWTDpANj7U
5oDq0A91uaT9tPsK4Y7Spqr77GOpa1nW5HnzCBAfvJxBpr3juQOacvXpmMq2435D13GhFcZZSTJFXQAU

qC5421cu3vI+Q/HxZLRdDVOb8YAQAt/MaEGIZObHeq/o+iZ8ZclvwMCMjyV9tSAiDGE8XZKODCn/ZMz5

vWz4sPeu3xqcinehtZs3HH1qohitOyspXbV3A7uPfe
PC1PPKKGDBtszwiQeJL2SX9peQSocXykFyKtHcX0Ruk8KaDhY2HGVHCYH4MH6kfSKVRTJ+JxRU0xFO1S

lE1ERa3pbfM5mo8nD67mHTR/DhiiPrZysPE1iKni2eUq15/HObt12/OxXhR9m7IzZHzmNLYFZCtH9R/t

G8ZoTotR/pTM2inK3y6zQG2PIUhABWD/dzJZeyBI5M
gy9qGm0CiW2p0q91dXgQtlSVcijG8XcXhjikeb6BbHPS1SPuJU0K0MO8sdPKIZVgBDnKnPv44ShkuJPp

PMmZMCVLjRtPljNTdqKlW7cmSKvIowhS5wZICwEJGHVJDKN+UxDQxbn9ipEIrCJ5S48Y6MndeLMp2Aci

T+icW/e+GXB7xax0mhVv3j57115XxQAEyBHGxcxt5P
yUNftz9uMTrZyfYIKXv5qNPIenZfqHwUtMZujvVT7dntv6nfbL8dJKL9X9geB+Nx8//uX0md3zHNY+14

sk1nwXS0s8X5S1RrNXyvKATUVLtun9xdOIMw9xIIVfo85zyAIsIk0msHSWAX7xwF2UGOQrxsB0w6hsZ+

pHR+ZfVwqef+eUD7Q+mMpj117s+oSt06ArGIRJIAsE
niNoCbVjz45DhAt5MC2iUi+djdPhfHYrEs+/q/KSoK8Wu6095fAlc+IVkfm6eTGXib73Z3t+DNwt3fD2

2OOBbD/X4dI3f3L/QBryho7SuEeiU4O13IK98QNfkBTz22RZ3NLPzjxlYBZShupFCzLejPq3j+oo0p7g

eLJ4zxcxxtUeiy7JFoJ7QBIeZoaU5ERQyOAJcyG/wK
sMZqMsMNuBpc0EKTlINGr3kfxIdfxm3OUHsPnG290GYSvf+JAPYWlxyPLRgAXJ4Kk4J2EVm8fGBoREtL

egJuXWXYDWLjAq913pwaCMEk5fubaE2qlA0OUfYUrkCe3xNDIjbXUqkEzt8oRpEW19eztpqdjQFbVlqy

7eu1WgGdxeVdK+uO0so+Cni9wZpFiY7UAWp1X813EW
hu5KZt7GiPMpDzJiuDvY8ZF86rOeSMDCK3gjGTjhlKx5lGqoTILr+N4pXmBH4IqbuSvX6Qe+BguQrGpJ

0cfPt3k9Fty9gORyFYAyhLRyqF2GbD2zngOUP5LrytWpuJeWUb8LWtEUW35+Z/HtIxKILRwSchHKNBk/

nkrFI807QGr6I49eQ5RuriWd4Sg/E//og6l3NQ+zHH
T6Y1RhC17PIlpkw3KC+I/r3PPXFKQabJs2b6DkhizW96Y78zhHPVQE4/b0z83Ewp11df3b1gMfC9icWm

HzLsjOdKslpiXud7YO1zAg8/xEMwBjw7/Ig8pPHrRJSpVephz4inmq0Y1VvedXU6yJkFlUFTIVr+JP27

cAfENQs8UIY/L5gqC3phqb9AF8tsQl3xw4VDZdBimE
nj1Q4M9dYrqQkrRJdkU/WL3fqDMFwsC1F4mfPnDVyV6BQ8eMa/mnZgl39ol1w7+VOy+69CEJxxG22CC8

hb82CP0xazaF8oUPyLYSXIe84NJFm4VP3v83fkQR7M5OLEhCT0UJ8FdhhY46ePZL5mEm7j0+/BMvrthw

j/VT0wJRg7DT+/MDkq3+KQXbJLKPPULM3MThmRV4c5
eiCKHf8L8vvzb9aG2Evr8uOOFQpgJlNLcBCav6ocn6KyDPdNcytop+q69zsMVdsfN3KrRPuO+18Z2z9P

P4axH0HZfltTrXI5uj46Gg2iYsgD9UWAx7TSiifJsgs44uq0o29xtdVQH7fyboI6bo3HopN8kbi+RCI6

Yvxvn40GMxFBxo9HWWvIJ4UyYuiNpaa7ETVN+hM+n2
BZIatWFS2xUxRDN4kbwC4Tuphkrt4rk8HVS9sDqScOIIAzTLT89KI+NV/8gGWu11qADaIp2mp6dhHc7o

RaVM8pS5/7UdTnC4tDF+ftmsT+Rvu8p0a8+WDuW5a01jseGoTdmDI8lS2nqK0WIZGIH2ILOEzTNaN/PH

490FXRoICwm3m8MlLWoMozXIN83WIFrVFaa3aWnk7g
5kdZ7EXKlamOBiKaIJ+xDJ3QNdWDeUk0VG+UIJHXB8gKAheBXupm3G3OpB19XT6nHyJINYvpXALmqNf3

UUGHE89gh8Hs3EVwIU1jvtYAVuBYIAPix1y07+zMIvEflA6M0NQrKhbsW7wK5m4ViJrK1PVchUvNJQ2m

fa8+DQOCMuexAE09t+rwkOLZjbdZTfj45FsMYL4xEM
l2J21IGjOAUDtVnPONUFwMUw1cV95ytycfxCmA0cTK77aonCp+NrTd51bVx1GST1OIXN+5DWFVJW3f+x

BhTmPZzfGifs6MEpXro1OkN6uAyGsjoSK5WzfE2MlbPk2Mh2AF2ej6P2B3PfjuJolCu77a3bnpqATMeo

rZKiCWclSLTfRoBExi9J92socj2rm0UDmX4Q6la606
hF2YTlQ656+B4S/n9wSv4/C7xTTNttn2He1HFPAtNL7RuLOKze/M46/PqGJ71RJIvgyaZ+t3UGAsGN5f

7p166qb302fN96FMrJXHFZp9qq1sjlE/+nCQQtEkaA0Llv8wf2mjjj56nwbGKsJUIBTJT5+hTmShI41c

7mYIH/cStM5uTR95J8GgxJ56yefb10uw94yJ/gvmHu
cqM5pjefenI5loNUwqJIndCHVCT3c2enZGQ8tHLsi/294+5qdTmY53B9Nb6mM/d586ttx02WeZH9x3jm

tDdpZG+K+Gw+5zjhXHpGk0EpdLrV1jntH9mvWIYzA0jEVUmhl2IsLPlnhw+ZOC00qCjSVxSgzCNyO7+a

4CIJbis46vKFu9KqKlCYhlD+yTkVrMH4CaVVfKMOxB
DkYnyzpWe8h24ae8WORvkwwkZnMSclPU1cjt8HRLv0+ufvApT2RUUjuzD6qYg+fQrap7Wk+379kZBG7L

yebVzCrRuraoF6G5GHBSyyoej81yginjCaAezTNcVRIXZ7w15LCOagdq2mI0dlf4NEdveDgKegTQJT4a

HrPAXQ6/5vEentI4I6OLvl1UKDT8g7dLCMgal1rSVh
dWZ70zryBV+UG1ZUM3Wlszrs7QShOHwNTWECbV6DSb4TKL03kPhhHV2zT3oem57w+zBCyjvurNK1w//d

6+e96mzTwU+GUfw04kmDiHRSU7MVbz69PVeJ958iDvuHLpK07HrOsAuCf5iE1ql2XoYNUHWfOGy2wMHX

F/HDFG0FHXwHgXjSinlKB/7/qwH0bzIG2RukaQfi/a
IEJhyvpusCqacnt9pvyQP+8q38SeIvLB1h6fyxPmBs55/CCymL6VrXu5zPSgE+vqOUe13k3kMPyLxnBS

W4Hj2VXumxBqKgXVoeb6Nn0otaAB5u4vog+JeX4fvykKcfdnOxOwS9bv8XIbNhvrlzhv0aHLs8esZ92M

IWO3d/1NEkZWi/e+d5vjrcf4miIcFUQPzXCIk94K72
zolVIJ0F0YlxHA0DSCTcacHtQ3BnipMFVksCz9nbhIsZi1mry531Ls279k01AD272eQtIvTIngYmDmLb

uQCUrCZb9noszEvR+5OMAafmRI/glsCNGcvwRkUHO/K56ghFhpYiYVZlordEs0EiDpi1V+eod7GZt0Ax

rqSrOe7EuPSgtkkvBW7Sly6H0aDqTq8YKuhXhJo1VA
UlLIzApMwDoIf9ijU1JDpTss9iHUi0daVdK3nDxdCodMIlHRSRrIZRV2WMuvet6T2obw9Ez40q7krrY6

6uTXx2mBHE4Mh+QiW1t13+yAI4AEy2ZFb9549qhleFLGrlmGYsd5Pn1oDwxPnFsI7ofVJEOmmwntrm1C

JAihu5bg8mJQfVJXsgHaXp92FSetWy/4wwpN3P1qZ1
aqL7AS0+2izgQ9froxSxNX2aV81J5XgjETYzHrvRZ5G6JEu9fS6o0B1euCx3cs84RPBGedxUm3f9zS/I

ypwKc1hfuctV6+u7GMRB0viWYnPtqdiAPBf6iZ220Y77d9CJvRBGqa70qFzdkKLLl4MTevfQCiyVCT/h

XZT16VRKnuhp3MqopRLJiydiF91yPXYCE6yVVO1uZa
5YWi1b3NtZ59xDScGwajmXkAqblOQqkryPrxecMBB3shSSyRoTw/kozq6tZQQFQ3calQWYO/SzxmIUxZ

7XqS7cC0/Kw1+twiBznZGhpWmV2LHkJZbt0kkAEapXH8tr0wFHZauiD9k1p2NA3V4/FXUdsrwzsiWMvj

Lke0O9YrQCP/DjPG3YL1sR5etJPSNc3UsWnXTSf1lb
xzTpxX0Gc8kkA/ZzlAExdTEFrIWSjC8iEWvXSuMUvs09DC8sllcG/JCMwm5ybkOCWV917Rh5bHIKE5DQ

/SDL+in9C4K/9P6Pke1FmtIv4OT6WcqeBV9DFeRS9NPOmCHsyA4cl93plwJ80hh+cUASNmZqIRIPMZxR

T0ldZKodt5Gc0MZeBobEaz8uBT37V3YAOzofZ3S35a
CH8vzkYEbNLGWnVLUfGq0/lme+Dpl+E6xjz9tlWA5l4jzGnieyL3/IHaXGys6k+MV5qoiHRoVJ3qugC3

sSxPPueTH8d3NjXglpAbwTPaUMcQXyWwQENBl4KxBHgsH1s1jIbJSTAae/3f/icS5+t4Z5cJ+8tRi0ui

7oLabv5Paommi4FpfTUQy3rfrOnEHieyuZWGB7cQGB
5hNKNupisLO3zuVjnEKb60gakDXZWPHlDGSpv7Pup0dSctwGKLPj7xOU6AZcMRgnaFg7wM1KqohX1G5Q

dBngTc2mWzkSdSK54GC74bMCasNdGyslPxYjLa648Vgw6Go3v+vxt7fFPnK0lBUwAiL2tSa40JdBoB8p

AnvI5x0aR2eQqqevKL4iQp2xLMPUOv/EHurnO9UB8W
fLrwe3fpQCY3MPzoZW/nNJWmxylJ/rprXX2pdXFfO63vn3uTl8oyC3qaZQ653Uoa5OjI1v5MWhWpea99

GZt5UlAtoWWVjnvPAdorSals2PmjQ1m1O0+Otfdf+4ADH1TDdebOXG2b6G0C+/EoTpmAuQFiZOkKxA9Y

95TxEanTC1t7omeKeQD2jpueXn/uHVP0q7A7gZOt5b
E965rwqAUfjH2s62o/6F0JjhtVHXCAzFx+3EW6WYlwn+2WPH8ZqwJcW1ZyaA9JFh9qNqpjkrKbeB6n6N

y9iYNPqluKrgmuMggfjh4RhuD8jgw7ZOWsNPOdwefnm9mDZEspzva5Zuc2UvAAbM7SE4ERDqy72jPx2r

byyEy9A+h0XKcdaYYO0iUJZKJszdHgLuIM2micdM6t
MkZ+3U29DdSm/eyAwuriT+L8YX86P+xZ0eb3dqOYvum66l0YtRKh1M0DQngwExdMFaEolaAoY3wiwq+j

NglVi+ZVAramjfjCys26RzKEX/PXqv9SpDkxIY8hQTXSSD48MIT7M+4VO0XxMzgSvv26safYkKpr9dJ4

i0olYeqxjm3SZXiGYMFdSrKmtwmYjxnCEsTE04QGST
zw0x77ufevhtZu2tBqXiCi/s2OnUyivdzwb6UFy9VwDoNoodHXhMBaBtEj3NJx2kZ+Dynh9gl+gwRRw9

fSsgRrT3OCxs0b7fk5W0rYwS6vgbgjgnkXZs4ErD2H3JsXw6p73YWg330JJD6SPHBeH6wpDqgHzn9i9S

wZT0UMObir3eUCxKZZW/iFxkT9SCqRMMGusj/IogQp
/q227J/NyQo193sgIXwqiwDGtDTDR6J5DKLtYk6bmQqG2TTHLqWGQcU79grm7DnaDQnWSmOUtrr3TY6I

5Qsdq/6Hvyd0/Swrlcdj+6IkxSWkI39crT8tOrz567IDlwJkVDbenYsohsZxX21xTWvEUSXv4Y+cKPeh

dJcYS1pfsUPlQyTt8k1kLPv/URG8hmjNVUDJaIXhOE
coz7JsF3QAjALmll1IkvZu2Pc2l+4lDptsGcRQfh/RvN1vHSYG7AK3mjo4M1n7c6pXqMWAiVla9mI6Us

I2eYI3hG9lohhIBs6h2/cP4YpG9ejokMdDhux6n5Yi8I4JjcOrl7lH5S8uYfjJ9I/BZrksybmz+VqXKE

4UBxvMdx3xVj/vuNS9ow8j5+bqPHfk/rqwTYO/t6yD
tvkoNYtZ4hMhBkuPGeu4tU/VnYb/yF/wLwh+PrMF+khxV54l2vPty7U3uuli7SLS1oSZfmHDVJq9UXXj

6NwqOquaB8oJkhAtQHTjLQNxCpw+12lydc54tPCaw52G3qfPgTx6udYqjdELWpz3kmLkTnDPlkmyIh4w

tt93WuBmiQCA+/25HAmBet7KtHmkkkXZRWOxtdaAeK
VxyGLt7lvWjj4ChO81iM4n3KmW6LovMO6Wdi2nI3RwZcHVCXe2OLQSujQxm4rM/7qC7NzYf7VS20r+A0

AH/8DjuZ2G8ja+vzYrhPwEO6HCGxe8lcdhoPXchav9wD3qFnIyS/fP6ET5n5mOlJla1RL7fvku3plNiQ

bYVBiy/qhf7KtszxNtxJkIbBbcabrzeTyyNzN055Fc
wz3WzJ+dxGYeq2GMR0GsiOC7WqfmwOFiAQ6rfu2A9h6dpk0d32u25SD/aJs9Stncf/f2QfwijFpaeoPy

v+kxExmE0cQL+zfDXYWvTOsRkP0fk6c3iIAMXHJo/yy8egvmVPb/z1jpfwf8u8/L7X9krw/9ouvBnOr1

GitTg1uBowt6x6slDdtDcd/qBiiBA5urzGd5UogA4z
3o5MlNzgkLmjEd7HhrwO8+yyhPLLhVQvEqh8PVIklGz0isp8bHwZAgWHCWFnMrAFII8PFy1It5Rzs4tz

w6ukP6UjPlGK+dJvL38240rOT+oNw/TiNuwK2BXUUVRJa8Z2yWyjr1w1A7T9xcyxFjfZI0zSnOU+1uMK

J7YH0kAHw6TtU4NjkOV+ZFpKP3esbWXCxJVG2BVFiP
nhqGttenn9CERFODfkBezBSZzjNvmvTYpRkeVlXwH/4Xy8WmU4ueos+pvjDTyu6PquzmTTBaed8lijfM

TakTGJshtUZljOjof+keME2LTUgWzLIgPPINvPjqdqrx7V9dpD+khK8lBlYrQC92bVSbm8F7VMMnh07C

kwEfQl/5dgRyWBfkql7vqmd4rbsEvjOSyo1/bMYMt2
agO2/KZy533wph6Z0kNTTCYUB+v02oHZpUtuvvIZoq3YaKFdC2AfILJDG0rSEA+DWeXlj6klIa5LbGn6

EqxQ2Y/piRrArubw335xJyr4u5HJzoKCr5yNds8Ola6Kh9yD6z/lNL2jB8NqcSS7L0rTqerTG6Ic3dUq

dFGyP7d3nu8d11qrtz8bn7L1GSe8e3qhui4FTIYsek
YM6TJjp74J/22BKKf+k4+up0cjuMVaXN9bfbF9wa1tNbj2sTcxykSZTN7jQ9iStydyxQKlqzc1JOh2oq

wtPxsizTHwKi/aMOIcHO1kWNKP96JhWol0A7X0UiIicffI7x0FsZuU+iB5lwXQ/l1YcumdCfsYxo9Wni

Kfgd+Rza0ZRKnydFbksUVMTHQQlE3vFd5z6hxcg6fl
CDogQqlQXalmCmBe5tD51F6qY547ATw9d4qlq1zlW/9s/xxvGT/u4xM73pPiPC7clgSMLzNne82/DFu6

BgwR+zR/kR0Z0KNtae0dizpwZQOMGJ0ar0NH8zzoR4k2AC6fY0YlXbKNFoa8/ZbWlTRE8GlaqHczywvp

MKagWBQbhpIid9x1mz9CvnRTsOUIaJXXwDVWem/tQO
H+s5h3UaoCygQpt2+rQvv2sPn4cbfUdOGWsxB9qLjuAIbZtiE55iFbQCSzVRceYM+tMqP7koxRJ6a4VX

jB9EEEYPemK7X/N0Zq/4eF0pYG99lJrw5At750qiCrRMrjx2qCTBzr6mQzfC9S3P6kGUN3nxKEAM2BD7

aFcr1K1SShkxM8WVs+42/AgfeH/GFLitSipH6Yf206
orAt3IcLjnZhwJGdo2k5EQC4kvPk5k9Ka5FrY+wJgqXE43VmmbXJE12DN5G00XfAC+NVtcvnkdW30VvL

FGFgISa/d826JWoJkOTxPxTX9vADWaDBQjl6Ha9OPVEzESxnejNX9tjTEulH1D40YV03gcXSKxvNolF/

owd/4qobu7z6iUL/d3AMUJKKKqQc6wkmt76yM3JbX0
DvZDtruUab9dVmRh2CiwdZ5p+vqIgNTfQ4N2PjGP8jWS1y7I0G/A0kouK3605q3s7suKOl7r8ubYbIYs

V96XtQYR46AMSICInMpsknBxmM7PPzwjn9WFWzviq8cHJ7s/HylnFju+FuvY0KTA8epfkM64SN6F/HOw

pN2mD4x2twHggKE3rHClkEfVfDcTyhdvqMONgTOI2U
Ef+H18Qs0X4z2hySV/p4gNQc1/q/ACvt3yY19tyMS46YOug7nTj0QwhEPvzaxqj6bHQ62PEzDZw9pryP

+iCFepf0C8j6G1spmWCL8ADPf28k4/h4VpRE9pNmKu35yg9xOLosF1SetTHFg9ROOGbkJqldp82SCzJu

2p6EZMyiif/Pp6R/1WfGct1xRBo5+BIR2XQpzbNI22
imWVd4qnydf8nyPTs898CTKybEUd1od69mKHvS/s05dZuOZ550WWpQ/NNCbeUia8Pb8cVSM3qAhGMMs9

25U06/bMsgYfPgnt9ddr0q+qgrLsufQgFAmVee8iwmxX+9a1vBxedSdsc8KiVbsU7gqFjGOs47HEVMFt

BeIGVOnBUYDoIouIXYKaSVvjLyhsc8iJWZX8b8atto
QbvTKFyMR0RdWShbLHQc2L02nFEJBz49LL7MZ96HWCJ1PBBaxA5ADwsJJRe1Cdz+bAZ+rR/vjayZv7jn

O+5y9pQpJdGgrEQM0UMGaDDdrXlTlAutD41yA6QQx6NFSUbLtqjXgnv4Mm91Uw5v5eF8coWt/HDnb20e

6FoMjZuQxcarLieenha98ffFVfaAd4t+ufOZZsuSrn
YmUkl6UHblCt63cucuDr6t8Cy2KU5go4Es3jUbxdMRXek6Tpwwmws3arSHmmS7Y0zLYN1Dh0F7M6fX8b

7PFzFLugBqC3KVgY+iIs88rvCmymV3Ww1dpQW6M5+Es00qrTFGAMJTVd0YlxwYA1l+CwsrY4WiktyOo7

HsOFQ9D8Fdt//9Br7h+jadUgYfyEtikppkXn3zMQhA
M/pym8q6n0r/8uhN5/PI1EjZRgZqq06HnOWvCqABTh/k4o7dBWgd2VJ094WIuQq/BvDSYprP/d6Br93l

VZhoQlnQaQ0zN4Ts+lRz5et1SE3YIg5N8bwL7PL/dQvHGFroVuADmj9iDZa98XiQX6oQYhGOC8BgMTKG

hMsucCOnDuJaHS6J7TcyhvoJjtoegMrYQy8DvrvMzS
xjZi5SD2Py0bGAJmMDjP6dFkQkFC3CrIO2B1FiaS/jbtPvzOPrHEjRpnseanpytIyq31pgC9T7kt7LaA

VG+w5zKD93iNQKBiUSBES9WZD/eSw/bpnUHrzN7KLy8Tu6UoFR4noXseJwQEGSbOdukVJbE/NF/AlrVV

ysTWvR9KOc34xU+ArJLx6YEo791tTnpMmPXtcfqvpm
59Et67Bd80azOu8PIAi8MTXXB6x3GKAHuIr26YPtCc4s1kVqQfhtYVKHLEF4Z63M0mfG8+riEiRZUf0Z

SwHXnoe5nQ98qxiP9k6e2nSUGWHmfRU4z7XcoRQG949Uc0SxmFM9iVE1BcRMfis49i6KLcSU+aL4tR9/

+9LXYiHhNQ169JdRCEKWH86g4zdM2Dw+P36E+1mJ+2
Q831Ba/3chlwz7P5FGVGrjiXMubKml6xVgERqm+bfkelY56AJl/LXiBcepxwut7XTivZ0KVaveugmeQq

uSrFvaH5Eq2J5Bt+J2+lYW2izVlwEik7zjIuXttWeZlhiRku5v6QOMbDvMAWe20hIw60rh7ogrtpmHed

yEmath6a+8/TCOpJhNVN46YimDDAIKL/BfIcpXN72x
Y286tDD/GeDKpyeZ/GGdasxQFlRLK0Alnbe5hK9J3CT+2YDmmTfP3YeYWIO4uhLkjNSyS6pG1ygi0+tR

pNVoKwCbEKfCfyxreJJYrndBBnp9uFVPt/4aCWR6xkZCxIdt+b28IpA3JCkkziBZ0SBuiL3dCIxymaRC

AhZgGZ4mnbMR04EZlCeSmVCDVh+mOKGv3TmDD6q8Oz
oqunLyT+Vhs2Xab+0XHArbdwtPfyH8eelkWCfqvoLnrFSKPUVK8Uptxataj0uz+ybCT1M/1JzW9j+sq7

8YBoJUcKnPBSfzJ2jh6pc8gqvicEn5yQl7Y9EGkZvu9A3CRV2FDIfehOIn+0RvHmqFrcq2BApBzBfhFR

oHS4d74cpNnprsMjkr9jQ7DXzimdjP6N7/t/coaNCs
azity5aj10KZ5iCq9D9re1yTONZ55O6LYXSnOyLw406XyPbAd/1Tbhtn94dEz4HeWYDDl6a5AjctJI/f

6M+btKkiOahfxz0zDMp4ILFOTNazOCspnT4D/pluuapg9h/CNccad3/UKsQqXDAnKnDH/fw0HZBDXkJ3

wzJfaMayDMcfJ45CbXevL1nZ8/IMKZHBywkZzeEG+P
AwD51NYLhvpXmiB3HwCc2HHy4SqHjvnrmUXY6Q8iWG7BBYN2G7gEN32wLHEyO6elrRuBH2FXlTTcKS8H

Iy5fDQ3MQ54FUQ9ZoiveDQw1NhoPgVSsorqzBcM/DT1qUcA5RtYm6CsIWzrMgaabb7F8LKvvC+vyyR+i

nBsarkN0H9b8A8FzKNoWIz/IKaLklrAjuT37KSdkEU
ECPipvObgz/VoxgMoy2HKHej7J5LBymk+5gbn0Y+kohU6yMKniznRRAGzcMe/zbDs+5i1dSDWsTO0eq2

WnvKjLTEMnUveXN6Gu5uEIQgczHhDHlb/ZHKRCfcwxgomC4WYHuN+fVHnWxEZ+8J6s4URRhDGep6Yws8

8kWz1uIqEKRsVKsnvtAdjLsS9mgi6c/sd2E77JWV83
d+HqceZM8Gs0dRGEo+vNpAzV+Cm/5ogDxOgA6PDBY6R2jVLbV8+9y8+vAJRzVPNAxZGCW19OobuOg0r7

f/EtcS18qRY2W/dx9g9i9b7X/z9ZYszmx2Pf8OkCc9MOBX7DofLNf0ZnxuC4ne3T/psy4zCtGkkrOgb4

MAlfbYQOf2UK9YeQpk2QsfMRlNKQOY1y7bIfvaENol
kAH+aOx1GLfvg3v0j0uV0e1RcbZ3otiFX03AYduby8EsL0q/pwJj7yvM7by42FsT+RHpuqG/ZwmKsyOT

cqbq2+zaaMxg3gjIdWZoYtKXE8ESQm310LWYpkNIahrY1L47gBUbmdyPvub2+CE1UsNJXXz2Y82hfiNe

s+UdvC3RxbaKgfNyd03L2TqIujygryp+tXGhg81Iph
CwAfm5zI9Cas49GbmypPQYrhFMYFhm/3d+MnbTgMkDL2rERwiNjv+5XaQyiOlXsy49BDXtdXR627CTQL

cDwBUlGJtNhqLwpH1ysDdMDgQTjBsXRNolcVhFN6eR3Xj9c40ayoB2EC0CGjQFl6VZ/LvqsxTvowdoxL

8FFGROw9GQ1iYKmI6POOHdvzcOkGJQ75Ah1C0rAVmn
gUR+ujxPaGl6cgaNPTVKjyI9eDnmib+oh5cqJ0YVa3Fi5lnaKzLR0ekCqvyBU/pdYu2uHY/L0NDzNBWQ

ukmvVLsMjOJJy4Ps3vwba/lEjSmL0o5TW6RNs6Xb8z/I5M043R+ZLx2Tx9P2PnJsvmre1nGcJKBU6N85

U3rEV1GufShZtW+YRCw+izSGtbH+SXlfOZSlMTyNkZ
K0EGkY9poTkrKG0JRC5zv2aLiQ/u3EwsCDeup8O++of9hlLn1MgImxOuHe1udyM20Ig8tQ8rhsdc5A4v

5gqcaqtyjY/Q9AvhcNhq/mGpugNMWC48/DmDu02BFfIqHwznuKOrSz3MG6/VU3QBrAaiV4UFJHDV5mJI

Ai5B92+UpP3nsA7NOy5TGkbzACxlR5/eD+NVJn14sN
+QoZLsCVWvTK+mWWBEzMwekCVDy/+uVihcMYPJ2lH3VSBhyQdG0A4MjpNWmP469OocHn7d4EVgw6T4tB

ifiCW5BxDWgY/8o55mzrjLfdHU7YSEFTNo+dbzMR9WU/IDpxTOlkDVi0Qjui11MyFaObl2dszs/4/3GF

4xGoXr331qX3IR2MDTUh1kmTlwVY6wRmW5QBvJQjaw
egkxrtkzZITw2J0GBvovWU/Tq8QqygBklphCHkVl0YAxGPxe+Xo/64gOl/oNp4/qnquTSVuLTG7hDuin

wu9HnH9D7lIxqf3oEn7GkxP3cgQW2tY2vgNg8nBGEuTJ9Z1sUZPOjyenNPwsfgQ9A1XrLbGv7vqFI4nS

dIibJJagsbHXLx6+fhfyPG9QgLij2a1h+zLaQOulsA
UHURLTOx8vaqC1hOY60/XudmoE3O1iQMwK/kg2FLYS6LDRmSh3La4kEJXGGaIjElUQfRN/2s8Ey4LRbb

+i8vHko9BqjI7pQ9y6SmCS3pzFRJ8X6I9GUZpVeV0p7cQXGIvWSHcWqAPEH99l1xRmvF/H72246inAO+

5ZPBBdngxQ18bD/9cXqf0tHjRLDPAJl80fzBkQCHPf
zTI+phdT7wSK3K4W+n2SfW5eysNHIBwAylaqNEWyCzjdl4InWAMqX8CUKikKwWeSyUUwkd1nmnqk+hLQ

CMwqanCELpFQ9KwtT7ONcpJQkZCrIMsQGig+3vlBb/O/Da/ahmeFfwRjdRb+F+zqdE1dG0zBQrJnMQd2

7rNWu+QMJnP/dpgWrS/kLJPH+srmnPbDFjRo1VVMo5
cF7vnUy7leywvpygzES2FBNTWAiVlGZfXk3g8cpJ7iKozPpOMWXQYbmb/sV55UJV/ndRg3YTzl14MGbr

GxwraTOPABUP/nO8moVf1H879N5YiIYaGkeJ42oZ6kQtGDxnqs9FJccu/TgZ6WUJyObiqMCKnby9hVxH

hILwB6CLTKUy1/lW9B2qjsxuos+IU5bM+BLxLAgYXf
wHwesUq2gnulHC7SRJcgMKG17ufNNHLk9PubnbwgrLaqaeBQZOk+SkAvUoxOljXJKS0JYZPKoBdrJNyc

3rufPJTFdtnMAObJ8cfnyfsm74mZY24n0Y1TyvoBa6KNQ0pk32+bcR/LV5cbuS6waAeCGNEM8l3PbI33

c9iKbnhudTCHQKvpL6jFqTuF6jn8xvbYDeBj/L0aHP
h9M9y32MuGKx8J0/dxza3Xe0ynuOv5ZvZuWIpHUe3tlUx8NE74mk80r9Z2gvSlSUrYjllTZo8mn5TZzA

3uHI57wfG6RjJFvgh3HHg5X/y9P0WNOgs3KDRb+6vTd9M8J4xEOO3WPEChUH9PsCrh1n55bCyhHUh2q6

jzLjNosqt6L+9n4k1q99hg7UAZhDfdcX/aw5mpTMl3
owsdHcHXXwbh5vXtSpKcoqSj9gBGq5hgqriyGM3EX9fYWphzlkzZ7oeC0jtvVbd4rio92T8WwuI65lkc

Ev7i3feKb86ikaDv6Ovpsad+uNRD/5G0yr03cC7zKB0bH082AU58w957fCj6VO1tQXzUhnt6toB51C5X

XKRC3M2WMtiAXy6l+/jXQA9SBIQ6QoH0reAYF91m5v
fGazjt0DxXlSh1q6zgW9bamCQAz3NNMBGg7a/UXWmf+Stus9IJFDcbKUeZVxwCJG8EaldrrVvFKkUWxT

2yYvNTVjoUqKvhtIQYzpGkqVvCI84fPOh7SwqYaub+7sS7WbOahCRRvEe0C6PhaN6SUm/6Zw9+CURy4E

LX6miB5fkl1rScKd3p4vKTy2lxING+fOjjTpj4dAgk
oD9xE7Pd3EwPeoX9ZiliLIhL4dktR3oD3StiiDpsXAdM+p/gyEpKCtsE4ShJWlJyaIGzFqtgQ8dIgLEO

KRay1RbBla5I0ux+lTPibGGxlM6MaMp4+ivg9ptxaBm+wbwDHFG8Z/8zeB+lPWTQwDkVOvjF/JR74Cq1

hEuYrJ0YtNz7n7YeGczSorKW3Q70aiaEDbpUqSAOqw
f4dn9lZFrgsWH4xLhdOcbUZGHM9uaF3T1xcPQmKTzDw2Ozvc+Gefq9bRES1Ddy2W5prmM0ORhL4eLwBp

YJkuIcE7pTM/1qhjiboHm+q0wkd8q5C3PXSj6c1K1dxqe7+xUJg2ibtEnbV3Iw+Acdp2bFl+ehxr//7d

y8Pctyy5j7qfvKi/Myy1bDMnlOFSfcPxRB4g+O3ajZ
AJVf1HGw9zoUc2Uu+4rnA7yn5q/o+hut81e4dmMXmFC1X7QjT3NbqafZX4I1rjnjwNved1md6GVnzbNS

aCMFuBb6vfu5U4fQ3FJvkOFa9Eavu+WcUigg3BUm2AKNclHo2I3Hpo2h0bYOyLejwbvUJmyTTCfHehYS

8sFWpeYeYnvoPvVidzUk6sgwmYFfYzfc4l3LNGWwGG
jveGawF653J9kNA8KW4bLLXpiJDFPujY4k0TXCFvfMXmTyR63BnTGFOrLCWFFebTDavEzXqvj0YH62SV

2phraDYdgyhN+Wc2u58ETJHlWmvWmmbJwoNWO0ogi9eeKpPhADyV62vHkElm/+TiSXH4uPVAxfkhImj4

UmtfY1o5KLMpzWeoELIBpvxEI0BqaolEx/Al2AGkRy
l/HruqqJcjgCoEPJkpICtAv79j0c2pZxc+Dq7PobGND1gBcJjejhg7FoOjtAQ/u9FiKE8rhvh4ZYO7lr

ZqeitRrnozhelH4/qvhrUUHe3qUfIIuVZ1Vx7W0FyzVNMUdBuKmaSVH62KSCIz4XoKidXUOdHqBvZPu3

/G+z8epdQ9evquXRYWQ3+t1Y16lWP7P+n6n9yCzTju
mBpRD1Lo+i1Um6wyQgm9xHqbxCk1Fs4s48IwoerHdA6/b3zchuEnwrDImafr+CSdd4ME8UHrC6LZSR80

C8ceMXIP98iu/NRM3TGlm2JEJ7zYmA1xkoxh2BS8Bc4ZizUDW3ZTO4YH57xCkxvXmk3SNY0rAqe1Cw3i

PxDurtq8JqU603RT0uZ/DQM2eN1kemQBqkHwOoRWag
EXQqxgG1rHiG4R1aiXX1hhr4XHnhRiVrVtj1a00zNkzDTkOa6MxNK99yct4K6Wh77UEAn065eJJNPlo1

X/QUDa7kAtJg1LqYKCh81ZzRoYiHotnh6Azq9ZOZPSLC/B5MlV1VNAemeGUPvbWx9XD2E5Gnv5MCF8dJ

p+XGlME87+rDiu25BlZgBIIfDWYoomxbbqHcXkCaXv
nUPb8mJ76kSxswGPySlWQkBpQbJVMNu34Q4SWI7LsvxnaU5UqdyKarI/rimcy16rgocVhkW46YICreh2

klF1+dy8lGS9sGPq3Jlr2clbT2dHK1faaPYuFLHfheDNdvwA3IojRAQ316N+g40wx2iDV1K6OUNNcpyQ

avnGv7Q3gU6Ro5dxgCwAueFQTVscqREO1BJ4HsBF6B
ee8g1acl88PxzvCWJNbOWAXTofHjPGwdmf5cbSOdQN3USqlaAjfx9+BZyVdM78fMgZcH3kukiL1/t/bf

1W5XbkCNaunz07xHeB0ZgPolVSnFxc5qnig9YgT/7+v89wleRv1y4lZc9rqxe0ftdkbBoDC8QZ1BhTYv

5LezgeZzFhL8bOPZnMyZYjbRCDKVkST67cyzZ4gMhl
+zpPx5KNp8SBdeg19LNL78vhDRYLI3/sxT+gbYtoIkwMJ89YgvYx/RdrW1MP2FHVsqGPvOAAnahclrBO

xMfHrq1wEGf/LeF4bOmaDnBqE62NodK8ZMgUwiF2gWJNOhEe94NowykVeJiVCLwk/lmy/15744bGoKFe

oUnE82d3i6izPv/lyIcJmh7fM5euRz5vbgmSdJAfd7
EyLGuor8qOqiP4fj+upPorauI8FWpi6uG7X5JU/GqIJ5N2X9LUrbyzER1nJYlmH63C6aYBNvFoIvFiEb

cnHJdZcFr5ByWhJMQIPJznZzptbyL8bUCNCUqaTKPQYQKyN5AbuuO2/qdhXb5+tH6PLowdSuFdBzkG9H

LJWYU+1XU91/3Dpe1vytzfOAQ1b6YF1qV/EeuFDpU0
xFNyCVEza6ZZY6t3y6sT+NEW/mALyiUpvMq9VB5bWjBeT+TuctTXZn5yqueyeLLe1H2cTdUOozNBfsg5

7nYXGZIyxvkOIEu/bkmpEi95dpeRKq71HG85cJA+1ERUa5qNfFZQOkikFjeFL9sKw7SAE4xePggYU4+p

gFrM3YYfnXtDqk+FXWmT9gHYRfUxKWfdbX+HSZ/mDh
OsXf4xFPpKxbcvELn2vcjn9tMil2vN0ZpUkIYvtf609QRvomHM1ZR/61oSJrL58pAoAp0I3wQ4731Klr

3LiKhK/kQBGqLpZf0iI2VMvF/Rx7+KhldwvCm5dnViS5aIXxv+bKu1MtXedCJEfm572Jg79Yec+gqw/i

9i/TOuqPHTDKK9ZGq50We+5FdzxztdKNfV4+14k6pi
U5QY4eFRoNSzpopox99hGkojgl0ceVlBugwAoh7LOP+fotf+6h2KMm9DRjjbqtI3PxNH9SN6o1Gt7UCW

VrguTzg+D6xTyDejI8I+ramyu+/Fi4M15zGvLK17dZsz6xNZw/rNXFqqvO4CpmFkoJesnK3X5sppxBS+

nBMTqYo0YdZqiGi0gO6kqUUdeJ+2osUTT8l8mOMRwa
Z05gNyx3K4QpVlrOpINu7DqAJy2MLz2HUI+1g1esJjZEWfBJ62yB3NJqCUKXlBXx7s58noVrZ4hPz3jY

fR+oVgq2jHj5P1nsPI/g8V3XIKB2o56jjKrD5Ajlp8W/UvF2IwzwX902yM1bKynq33vg1xJY5pYboO3P

m8Nk4eJFIVO6BJdOXa5tpz4n+aTpKoCMU3wGHWAd1/
6Bu6ylempfv6Xi5DMKGVDrZQGe0oxK6aLRv69Ez7JRZ7HpzP4sOuAKLdYFwG4A28y6+z+k+gv5d4Sv8B

ugWZJT5yfmM/jdFp5Z1xoJErrgrL/UOX7KcvLqvxyr7WGbYA+U1ndf+NnvjoAsrGpvOGd1UidBW3ooJF

UQW08qm/hTITPoslaVLbNGYB3VG/7kXyKZOmvphwgl
jT+IErpG6h0tq1caloY+1ftwbUCoKo/z/95f/GXQnTraEXuwfzJFQE4iSiJ7gNKVo4LYe3tZYlo6kngT

z9ljRXKOJH8NKwVppV4PqzEv4cgaQIshP0SFhBM7UjvEfK+MM7QIjwxwLuQy7d3c0e+tAuIQMqg4X2dk

EwdaYoMRaSWK6jMdfSq7rJ34vjvu+PjOjCTMMYGv0b
LL4gcKGrxvI5+sOnPNprvnoGKrvM8GuUi5ISPK1v+yAjaZKYDqyo5gIMZHjSOEcdlEtH5+4R4/fBayA/

Wh7fT292WxGH9h+ToWEFmpeUPDdL+nRJTx0VprThpsAWBFVfGpN5rGb9Y9whZTH8+s9H+SpYtaGDl684

zMXy4bky4Me+CXTLdGCejBs0jet8iRXdc+VZrBoOSV
ws7oYkdU8TZsBwdnmp4xkTwnKJWCojZC5wI5MolCEOoAHNqpnDCgdoVpm0zIWnH/6u6jsJ9BwBIDxNuC

wxqFT6It8rYwqYjj+oW5SzM/ySk6Qk91ruGVxPtQR6tylR968R4KYIQfApCg4ExAxE5gokypQwMY47Be

DWnaVrUU2mpdNZQIoA9jgRb5hN30mR43PcYvHFMugH
LsAdYGBhKFcAiSaW+gzL5nMO4kBZuqCQksIYkQ8uT44fzJYbg0AcM80UpoZakflt/bhc6EkZ+CHC1gGo

NgbAb0TxiWkeUTMAO8oP+nUCZo1o9jKXvuONhdAk3rs8WYg+TGfgTKSRirUmT4k5KSHpXS+sC01kqTDJ

Ghh+Y1sX2bUmHWxhM7iftPQ7pc54mne8UM5GubZGE2
KebOdjjprmUHkJmuTdgD5zaa0VGuO/OJ/TLbKeF3wRyJdSbrElRc8Yyv04xrr6RFIOvLNsv4UQGF803C

Ibt7Br1RopuI8zgLDAcgZwK42+aQ792mNxLl8D8RhiNwS/Travis+AsELeacCNo5T5qKL9UTbQRCQIPRu1i

Qtvo9/rkEQAkQM2GcOZXesr26m808wfMdXZ9sHI2iZ
+sRSCSDOD4hDXDEX57m+GeYXVvgVMpyM1GT80wgyDO2B2QmIPIbrFgp3TYBC3ed2oxGT81oUwYwkbuFg

ZvgPHaZ++jN4nAggFjXOOqVf2VWnzU/Usb1ROYHVCuUFkzBhwujFa7TdktCjeDgMEFA5caFp4Nml6VKJ

FX+3eQHtmQEK213culJtPa907iw5mLn16tsRRcoI3B
bS36gFsSzoh2uS2cSUZQypLYnbyf/JPCQgiXvV4s0r19glfJo1/qOc/BCbcPyAUsUxTObQoJUF9RBeTo

YHzuNV9I5BLb43GGb6Aiw1q3uM37/JqDc4w9O1Jztog5B+F1tedmbIG0hk6095wNtaann1uWpeyaraQs

WfodamYFyAhp/Uf1pRo7kv//qTD0Yj1TUGdfNxi8Yc
DHm6THdRm8vJGDloXH1ISHRcot6Rf2nt4jiICFj37pp4u6EafrnsFJgCI4RPwJPfey5nBEJyTqokrtUc

28A5pUucFV4J9yeHFmAEtZWwanlemx/4dlscxX81tVdAPVgga3m3dSfphiXWBcg/y1DOHRUoXu1sV1bg

FGbYXkD78cDMEEV8glWU/XQRf1jZd03pyodsOKZGyJ
YzXtOg09/d42zLu/RlVk6fXd6Rb3KtaUb85qul1eENPrOpInB6185QQTxIvZQq78tlED/KCpmj2dnhgG

y3WO77WGFZ7U2PK9cjvQksInBJ6Bgkiy0nLzs8o39U5I3JNNtKaJ0Jhv1JVNOdaviy/ypzi4iSThf2Cj

i1RUwlHH/j0Gx+kBE63NIg82bQWHx5iJ1w/LWrmDJM
AQDndGGgGcgDTZqDO6BMn3xeqjOhbhC8D6I6Y2OK4/i8Q3MkcXsnhUDmb3gRPay7uD2LkGDCb7k7tX1O

/rqaANgGuJT099iHJN2lGXR8LQgHVGWplJPzRXXr3TfdX85oLZgv0Tl3ntw+bYaJLdVu3GXeEsoL4BAn

NkOChL3TUaBP5amMzxh9KnHlKLznYqGgycK/rOQSsG
KzlUQHp0PnraXAaH9bdIHs56evAp2E1y/Qff3iOqBtJauNpRyfcrdP4io1XYGYQKW1i0367H6j6WijnA

W+Wefp9oW05q+MYQGFld8UnVXq7kme8mvRAF8bh8+bp3XPp8/qxnij3td3GXYw3aQA6lZMyQi4qIhYQL

hhOkaQ34KV6k7QgQR1qJTe1gLREGucaVi/zuIisdGt
c9lZrvw+++HBfxGu+Ve/OxpPlhlhHIhBa0idFfyJ6pO/2+3oMW6ImotCzWZrzigfToM/YtKZgpSghiK7

qa4Z9/PF/TOx6ZGfpuhpTQ1RTur197wSRaOnhx7Fp0ZLVjqdeOgUqKx9mwArOnU035c5insbmwLE6x6t

ymx8Pa3WfGL4OvazZSjk5LlslRpQr2fSAqktimmitL
D0iifZ75id/LzZUuFwZLtrL4KJqwYVtWa0wNDefzb/bC1hhgjF8INvVZuYW8YviNIplU2X/qPNjS3pZk

QTq1xR0gizwBDy8GLN+/UZsjBMpdlAu3jQ+dkTrkganY+IyqBPY0g+0IHLlnMKE3oSjD5gkjdGmISU/K

jyyFIhOt7xtcAksz0KvNRty8cqFwhTKx0m+5qwjQQJ
2rr2WiuUexXf0ueRvPinuYJVlyVGJ3aCRdh9ntl5ycu1usDW6ZW+4ukQ8i7xLOpeqqSzct4/MoMIMxVr

8IWOXHU/F7H+NvbhLVGAO8QVtp7o9UlvqdsXUlCxhFOxrdctKzmiUzc1Tt0R+O1BCXodLa/vl3DyQnhH

aUxWA4ry4UYjlc7XcOuN3Cb+3o6aq+dMjKjcouZt55
vYkLYbQyVUXLXNROl+y6Iu6vDl1vtmPF6KO7L4mGHeDmjUVYy7XyH75vq7XYwJebNvUcj81uXNl9reEh

02wWTrUUtQcITIDkeCSADO4VrvqHBJpzg7k99+hO0SbMIJ2yZm6oi6JkOYY1hdbTrGnxZSZQ540jBsro

nfFfmuJRq3rsvKXMj24o8KtjcJ3zqlCLNnmV/oNknT
hkByA5eP/nwe0rX5DcVXYRKfYbrJt/UvOojwQXV0FeLpuI35cOOi6jNPguxPT1yuuFha+fUnaFlQU4zx

JWltFxANmS/23u8MrzACG/Ymc2iH77phxQKuMXu5EdgyszXpMppTd3kZ50ioE4Mf5MhU4SC2e+tEVQ0f

Qfx0hT5ji4l/Jlm6/n5M4ZJBNGvG3bj7IV9e+giJ3p
u+SDNvzjuTTT4tUBr1LnzVwNeBcRSHeiJU4cy77sE4L0brcGL2zncbYELq9eYMm8hH7G5YXz+fJETcPp

5kkFxi9kUmCAFA4CwOfpqewp2jst08285GyCvQyt4SXs6wgeYWZ0hmY1BwQk4778Aj7T7+QBujYNZk38

cFSgu3N44SoYUurYFzLhP6RKdhueycMJPvK8UUJG6W
vHAR7avjE5RwXiO3lBphaS++TEVwz1LNDxXah2sOHYPAcvK0ZMbEHKd/fXv8Tq+0WVFgW1pPN5/SDEV7

r27B65SDYzPaepSGl7bLn0fz/UQoxLKdnmGVNmQ939dCFsnER7moqb6abasFR6QCHgvIbO+uFenJQ1+R

D2TBKVcOru+/CmCTRE2boKu3EhCvpZr1opGAh0gEvb
BOF8Pg0ZmGdVLKSq0oker0bUL57OtExiwId6uIO0/uaYZpYQNJvFCZHFXn1Ni+OXEBUIMZN3/gn9WLyG

F9HmuzMpX2qdVsbmQuYFJO2s0m3FY71FzjfXG1t5whpfZkATc1DCnKUapmUAqmMEJ3HVwo8a3Zo4CdYN

aCEsPmqQBX4bzatFx3S4mfUrAEQl2xgF2zgcEeEYlI
H7/NBStyVtBMkeBmcqlb4nmT0hyI8XlidSABS0yDfoym6CrNHQG6OkaXcdDV0Krktuu4KrP7lhUWyqAI

zkNBJ0lGtGyBp93dv9s7/BYx4EwyGRUh2Z3IIgCMsO9j9udXsB3csDpMuM+LIYVqKEtfvloXkieTYheo

INar7f5SGPzz+5lsHnvjlZrZFrz29y+39ABLJRqd2N
Ks6va541vAZER54o+huxnCkKIAqLeJhOO5Z43hgvoLY1fWkPUAjEtfkUV3naGEy1NLjpV8mKBHw5cZhc

VH2sk4t/aW2wLMjM3P7bpZ62in+0SWGn1GFs+KvKa6srKoqwypCQoSwKz3cOwizUHNM3GhLumEFwTaDI

0YJt39jRIi++4LCQyul1FMdsFNA7R9XpEdeBB0fwDO
zJDuIAfli/rXl6OXEs4BuSW8iduXJvo6ie79hC8tI4OKamPpPGVESOqkpyA+trViu+mTlukCfH1YNJMo

PcFXIYC3SGX1NenDC4NeBvW1thFd6KKOP4PBc66HQGq2cE1C5g2iQeAV+iYVwEwwvn94G4yG79Xryf/1

BnRhSyYd24KM3bz4IaJy6MqshzMY4E6l69hhfL2/sn
gA9jcKKFVmVN3IC7864f8EFANbaCZBsuZHTB619hPrkz4Tj+9YQO+jTw/6B7cGueBRyzwq5pGn0bCujp

hjadO+A3TJY+0Mq8WGVorsA1O6l3bKlGXGFtBVkmN4gkymJaZPL87sUOoPow8E8+2tO7rbwaBjKiIQks

P76O/2pLLayWoSNOrELbkrMZS0UpLGKaBok/vj+pyr
VB1AOep3yO5JxfOfWdxqbTNXyJ2WTYzZz6DecejQ/OKKcj0w43wPzNbzsJvmSBTexwH3P5C8N05X/ji1

hQkncAYQI+Cw4bnphl+3r7XIhiUV02Cyws3Ym3tU8lOE52TkKXpBWZVyBR652AEhpRwpAE4U8iywX+Qj

6SZSQ9X/lrHrmzprSIgtGg6UoK/svcANMvJ02R6Ij9
u1dF8EBOYl8zHuCiWDsBxIXeXO/rQDAZ6xhJ7PH6sq93p0+DvUYCj3sJUoFgtAzVk/ubrGXRPHwbyLU4

Fgyi732EYLj8dKR47xmTxJgkBPVxYBYJ6q/OAZcl5JMl+/XQBCPOImn2Ll6UHs6GdoeJtkE5wcvLL69v

A3I358xONmMO4bAI9X4HHWKPKcHz2mHzY0pLy4DJ69
rHhPhLwkRo7dfteQWMHbf/qWzdq0iRZ8cErsHV/O0jBxpVkttdd9455aQQR1dofruUEw+L8FfDKICJm1

Aj/r4cphXRQxp6ujWusEIBdpdcSRuQP/1ApQ+XHz2a+AB8cNni3W4BULHzFcl8ONfW2fmqhUgSr/yMes

HM8xw4AK917meBRpK58ITrOawAO11OPQ9Jc6/zgZzP
Y6XpL6frd9bsKmm1lPF9lbR/eCPfA1uEWYdknUR61iqOGH6oPuPPR0pt0SwqG33jtpytnKIcbkIGk32m

ZflrRxEwS7EqMHKFeKmqwW+3ju6VT/pLGVbItTjbkMm/PBkKl2JY+TLS7/0hkB0O1rNN/5tgM7MYK4vA

e+uC5hdMjeYIE++fjAZk1wkdxXYECgRciiXmo4Awq5
QGwtDlfd12/FfGMnUhEBNYW79BkeMhKYmpLrKxLg1vydEjs1zO/LuiAjjqWj+XrEHUR+UwDMP4juBONX

jGSCKEFwxdEf6UvWcXoERqIe5osugVawu5wKvtWxcGENEgLDJVC0VDeM9UUnUu49QmzYB2nhUcJQ5vTj

OFTKmDmM6z9nX/oGhcNGHTJYQtQYSiK+VMskSw8JCQ
raGzeYc7AzNPDhqY4ajy2FTrmCjZ+Sst62R3XhyyTGmB+b9/R6fsC4f7WksjU0if29fZIlaxowMFMN9f

1eF7bTdImCuNvanQLI6heVR7r9OWpRWGl5smD4qPYiRPXLPqO31CvwEtRE4NWn5B+aONY8gwoPZWsmOh

y1xR58dGLx8sqvvhPCKlAx+Xctjyw264pK0tifDwnM
wEYh0cTPsnhA8e04ANAlk3MYm84gJ1FR27br6hia+MF3NHDSPFyGh4wgIYCypZRHI23v2NmADDTWZrcE

jGhuwvLXT8AlBGTmCMKr1RcYaD+pzAJ9rLI6q2Ogj6Xe7XndKw3oJm/wCXsJLu4Nf6PW42swYD5betdV

n4YsUpzy34wyHOx8f4is9FAqLRRQG+/t0O4RgbdnbR
GITEFphIc/SVc6prR/+uOoYkpEjbkonD0DZQDC7EksVDZtevpNOHewJxnKJO0PerYLqM5xeK0GsDFUq2

rLTW8ALWj8ga4g6PdIMHzdp8CvfMTzfM6jV/sJgFPgzGg2XevMdDY5LglD44aZQqAEuch7swANFbLoBC

7nSEmsmO2gGOOwX7T1ydKZopzibAo042fxX16a6wxT
WH5hkZn7ZU4duu09eIVsMdPWglJLEACuZBERUG2vJbjdkTcq4LU0T6gD9bj5hK55qeTYvG9uBEuY8tpo

1HjKBepRnZliNE3rfjXaihQMueqPspexSY+w2RkIwTkPTvJ1Y1s1Nt7tCAV99HroyQyBJEXrn2R+uq/4

QgIs8Ekxir6rv7h5EKbooWl+l4XPP680kbqrdczR6+
1Br1gxiteKxrRAzBOk8bohlTu7XXxr8DvUvrsRAOPvXiKZEsjx8FK8yfu4QRr4V/qqnyq8+7xYPM8vuh

Ml2CKKxeYA7rzAQL/J5X0OkieYo1nUBF1ymcpJdAUfJYeHv8pG/TNIClUjFCr0XYP2gdXdqi9v6mzsWS

b4YpYsLC8d6Uk9LHXlKMMvMkFxRvYMDJTsTAvUQTpm
+zeRvMzF5mfedEprtXp1dr6gjwgG8RMtuqcxl96ST7JFwJd9baipESapvCS3Wq976kUCgIQI4FR6K8kU

IhdUJY8nBsZUewgs99ubPms93b9OIyyuyNH8EPNEAmUHWNVrYhqEh7tfTEQeWISvDsyC7k5YF8S0ISRS

+/hjOjf4mzffxO2/BNH93lF168bIc6GOqtHz67rOTl
YI61LZZ3rO/HYXqpSGB6uV1Baxyd1uhstF94MGqJQ2B9cXx57cY23ch7465dBWZL+1cGoz1QQoF72nex

4HWSTd0IYKJ8uY/Ibf/S5XC/0okAYUXpjFquXV7YE2hEpf0h4EaZSgxpQUvdkBG/llRVNS0p/qoCO9SQ

YdQhlARvhZU9DqEECttB6+dX91pKu47xoo4zC+e8Sv
WHepB/sm9G0R/HQlELZH9Uu+PcV6ybZUBugfoURw5Q/CtmeUPSY2lEEt7Dharyy4DznZeMLgPrxtfCPO

XefdwsQcCQt7LQU8DGMmaqgllhpiLFjexJnuXVDQcRprF5hW8X+it2+Xk63i06Vxrm7uF/VlFpNYieFK

tNNLFCX1U9m3+wIeATfuSe4x0tTBBB8RDDM0jk1MqD
YCn3+S8iH+KJ4LWAyZH6c7YYx4Djk+CgM5JDc8KpTNIbR3a33Mg72KNZHvz1V1LX340awdeWAoJOe62V

rGAH+he93ikejiDrWLW/7MIrdD7Pu7VmOiXZCFeujbeRbt00exETOcAQgIWcAuKH0uQeruEvWeGbkR+M

pqpXxKx1WqkzaLsnRVOLtfOMv0aVPkCEISoAYhAY1o
Mdo6FcBDi9kyFmfOCV2TJQumed1AWLxwMgH5sxK4DO+6zMYfVHb5F0wCT7AvhgtcPitCSPD0KrgxRM92

hGihiQH5CzA9d1BgMy/3B92yqHBU+HNF6wrP9jB36ajIYnI/qLPrsNjARiIJiHK6dxKbEZDeBLPnkS/4

/ZSiWFCv9ucw7gU9VdvagLApj4fMuaE8VDFBR4RKUL
BX1AH1d4url9SRJFSGPeTzLJPusJy9faoisBNUEVLyzmyNhza5UKHHXl4OiDK0w4jZLAjig/ePnrpQ0i

IBmDmwnN13NdKIR0Hl/D5eLJxO3F33DBZOkg4k+apJhI7/bPwDJioFAkst6EtVqIcsD6N00Iatf089kr

MErhyf1IvMAD7K5g8TGCzE7+b0pSGqeWxA0zJWbxHI
sfu0xbQE2qLtkD2rDWSGkUSZaNRZO9gBTqIq5+2m43awewom4tyRH+9iED62xxl9rQU2zpt4fxTqUKOO

GFsDeGQ/8kpYkmdS4PAI6JrsEab5fZTM1BMZrdM/wAoxuXg911gT+EYpNwyEm5ykaCwJXmu8SILf59DV

aERpkp20M4tH/wR/PBIvQwyx1FKAyQOL2PUlqNTD1s
y95h9YhG5eONLsKNk1lXCRcFn8QYAsOpiC6VWHwIItGW6tmd9gbTpmt73zWrg0u7oF8ReIS3IqhpNfhO

h4Y/IkF9jZZvtr9JXooTxr06GXSWoVjThYOo8a1lHAnlwbnLNb87TiE/Oon7Qs0k/Dmn2JZS0tQBMvoy

1Ok3Kl86Qvo7koCZBTmLmWjoZSxCwprFv2rr0kxUm2
3ksV/G3aQAi43d0Ef1Z4m+gZxMC8JRItykoeTQGem5cO1A8qO0AlRuuDJi1ixOHzzpCILWUE3tGMej6j

HodZEmKZlCSvT6631dkTxbzhbnTZzAQV9CD4Gy1/+0QSrpub69HyMHQfIYAyjmZlITWKVkQ3f2wTUqpk

Tc4kWGHiIQUhGptvWMTwimMVN625HR/Y4zDZIsnXjS
6ne5l9IpN7b4pG2iKM7IzzSenAECciEZwekqoS/DOawpLFTS1z/ce2+T4ID6Z6RFFvsHCvJVCiO8g7Rr

S+z9SDOfZUOwtk+Xdhw4WnLe+R3b04v0v01667xKd0Msbfs7vcTQhj7X11re27mID/jtURsif18otueI

MVZYXoYMc3IK3FaemMSeLMoUiH2wgNuhwF9el9SqdP
lz3ZgJcP/ooBogifYmoqjB0RbYhR0YHso0jp7zE4yCEU7tqGgbS3tFDY4JHGwgiGE17TeEz8I3c4XoXn

cg4N9Sjdie4yfmqCiQvJ3FxkpAD4Vc0cnje7qcEN3DifSnJQcMcknSYXTi1jmGPMNHD5J55KuOi6Fkm7

FU92S2AVKr5ueuaG4erscUSeNz82nru5n8TxQh2UED
6qLlBHcNSMv7eVo1W7I7Tz11Tmieij1TY58q9sCQxS8f79f2XA80quV0ReFzWlw4ottSkRKu7PbTGFH1

2xv2vMJWN35/rdbPncN8SflU+C9+0qmFTqorli0JR08n5eVN6BKN+0exTWN8jEpCGaUG3pEMDKh0U0o0

adMdt4DBZFbep7fcSrV4k+K8s9nrMkAjkUmjyAmKC1
fQnrsir95SMO6e6CIPIhZItWGUvgpof4S2gDhZzTzt9x1kaaXm3IDE+aLvv6SFsTVyq3DSLxVkpOphMn

hJ3rJUbuBFl+dOy6+Bb9psR57K1TlTRUJm40MEVcwktxU6VD10aXJCT7aRobo3pW0+81XPmLQsSejVOD

IYzE4/4/y0AjJbOYFzBs96xDBTzbwGS7MhS6rtN1sS
hxBg7foNZudaDZdoerdmqDAH4CW8Psmeyj+PUhXppe4HE6vpysIEtOAd1iv8Mpp1sit+iir1f+CN4foM

O3qB4nSI0Hi58i91+TQfgU5ZgSGpR4GbjXPo7ujZke/ZUUuu15YRRxMu70d62koPeEW6Ykm98dBWz8SH

REP/2/wz01f9HomaiA4645dGJYkMU+A9BeDyXqxYji
5L1+HmhDCQNZe23ZeYa1UbElwd9IXujLmdi1iToyeHnsAnprOXGYIr2MVnLAcY7TjaSsC0B7E+J25Vsf

YiYfujv9qviXhXJeSzgznA2SXdhkTF7Rx6/pukrLsF6rEOv6STFxYua3DgbP2pUzWj52lN8guv5ut1hJ

TQs1dMRfAhm+D2u8yFNptfURlRqsampRoYcvcz8SG2
ctCp1Ocyupqgy/9cWnf5uwzepnVdHRhqeT88uJWkzJxTYg6ZsPByftsdLyg4b5oM0kb1wlR8PJY5Qb8K

y/1Zv3BLjnAR0Xhg7d+L9Q6TJ/1o85/vUpzd8fuwLgicnNhAO+PffMkNpRd5dSAf1Q/pZFLm0wmQUQxM

31EAXPQOvM8QIXXajUV0GhYfqx+0Fm2SNkzKKBkHFT
Ys5YXsgVO/RkwTKHFA5XcxMItjYzdflqcScFB4li2Ld0Oaedo9w3uRuMbqD9/E3ZHC5coYzvYaD7t79M

tzXEzY9mx3wrr0jshFHcUsz6MxdTm00tiZxBhzwMoPfrZ5EGWb+YMoLyaXdokwoCTHP2JAjI+yM1Co2p

HOwgqauWNpBjAU5+aGzOCS2+TijYreZBRSAlr/pvGe
iF1NB9k21Xk/L9T9ALKM4O7oGtcMtYeJrndYZPpnVFQfqBkNq0XZ5IuKJE3mpS9Gc6LM6NYIh0QLAOhe

gALb/j7HwBcgAcSP7DVYeKMRHOEH2ieFTotxX19Mf+iJmsD35yRY2Vwten0uNftUZ4p397ngEilnJf3m

ScI/3CwCFmuZAbbkj0FJMMsxevAvKgcqDOc54Suw10
IX1yU4NUzGIN79Zp9+//5W0sGJ4KlIoA6nKwo+2EMEn+zpFCahOPaPgYhVbGba1tg2aquYwmt0+ttItt

reLNB8iTckCnkGOZ51EvlDkMuYz7a6QdayLfhpODRObOLzhg7drQJVc7Kmy3+0tWlG698oeVhZ32KAhg

mUsMOA0FBRSpRcBdvVkaCS7pokEOU8RoAv9SEr7Mg5
9KXJg3NIBcnHWerzoDjm0akOD48Rk7YwxaI5ahTlyHqKAQWDLqJ/+lvy78m5NLk/Wxv/KV8rcaEiI9AU

O4clIXUXYMJhmYIZl/RNC+fDvn7uo9+60ke7oGXsdfT7rNaIFJLxizmr2PDvm2FwTP4k3sJCixH/SORd

cNJ6sPxhcTbGE0sza+NVJCNZ3wkywX+5EwAVsYxqWr
QyTTUXRfSLZumltMH4HVJ1oZgqlZ1ODV6euLj2eV3QUVqnQfld6AxcDtn4sysmIz5TlTY1Z4j50lhPUR

m6D+KkWFIm6dr3em3MOORi9Mj9YBUXcqV6HURfCOYscjG169bBktCV5cQ+8eouM+3XQvsp2BFyWTAjrc

v70dMdhwpqr2heOYMltFQItogPfebgrZAuzSqBD7cQ
tTQ32AxL1eB1L7aQQ22apIKbMjTSjonVH8anPm+F6J+R1gkfLlAtYMwitGDA4tW9duN+HjRbV5eFdcDA

YT5l5tMkNRh0F+yVpYdLfCzem1CcpolXPzhNTgM8C6JIoBS+XHQ7KX1MxUOKP0cEKPKbWC5I6SCEO+e/

3vXWxhqEGy+nc0MLK2TYO5Ii0F1udmfK/u4ZqgYcNT
v7xiXil/jlYxfjCNuAPgX/LATCswR9wT+YpD3Vc/djWkN1eUVqOiubSy5F4ea6QGwbUHQ+I6GMEi2MIi

vkDXONTA2TvuZ0Wy4UPW0jc+dUW9I2yPS5A1ujfwNbcRsW2GFgtwqlEPMD3PRElQLPCWkGvhallB/NDw

71dyI0PGOkncKrL+8zuxXdxeWgYJMnMVYPFlbfmWyK
1877r3ERYe76jYdvQQiWsuGNrNq8BS2+4AU8+2t5KrF7mV4Unkr1LwvDgTMclAtO6o03LEQ4EPR5f8/b

3UBc3IcPlKDN02Dqkhyg7tCcnmV+rLAH0lXYtlaVxE+3fNKfvWUXo9AeO1GfGq9L/kt29zpDXkIQx1Xz

Dv/uFxHlUj6qhkSGDtNpNLwnXA9hzp+e4zyNFi1qiL
KH2Bbz3hbzDpzWHNRpdlVB30B/zRczmoIjzbMOIQR9WE1UesHnk91a1V/dBtp/69OOM2XaAcjD/Wrslt

1mcnxa4c61fOTVbmJuUzrBDG8FnUQ/faRfxijMCxZ7UGWtpr9lesF5kjvpjtTzDvnEFGub80psj/70hq

7/63u3xqnv7kyXE+3qdWInFVg13/veqx3ZblDmeF1F
5WA+4JBa4tlIj/6GMeVvN/kfxqpzWoM64KThsqzkIM5c8bBPqPmtXQuoLLhzwmjnyrkZ05Bl/A3lici6

NSuJmRES8X9e/cDzFBjTPJwVwmLERdonDwcXFH+DKjJqJA7g5YSTmna5H5Pf6MhDR6L+745Vb3xtVg3k

AG8Gh1avitk2eTJd1gxQ7hIMcwcZkDqncb5fQw7JPx
4NocmwTpRFaZ+nr0g7Kl1X0bH+d+yHG27re0e+2LRQXK2cR3sPtR+aZUTYRaTwOJTAWmdj9lpjjpKlRt

7KE/tURgJpTAOrR11+Hzn13OcWBm2Cre3HwxpdpBpAvTS0mppu5tspY0U3uIHnsBXetdev9LimKHvY4I

Z545dLMAc2LA/JAwG5m2WiP+ZGP9+YCcF9awJX3cAF
D0ZMGbYyVddiwpbQAkz+ocyG8H83aWqdzOHTeBEPF5ukEJ3GtiZmTOj94X55jg/zszENY+wRs+w6Gnjk

SDD9x6X3tAW7V5T456q/Matt+tDg+rNatqD9ABj7wConFot+yV3LCvU+PvkQ7glgBA6Yk1ynFHuH43eet

znmImTZ71rgJzsJFq4jAK9P7tIa9Klj5ViN3vKdD7P
zv6qcSMvwc66V4cn3eyvuJxkDy/OuLp3oFxhluSIkdDNcG2xeD4URb3g6ivk6zZMhz9vXqIgHR4msny7

/QVduPWauI+mpaULKxxgTHWCPUwlhjrZzthxhM3QtLVF0H47ypiuY0RpG0CjYotQqqdpxZ+n83OUe43e

6IxLCd2LAGReD1lrvOik0ZVkUSF9BdyobWcqUemFle
/G3AHES+8NOU31IvSlixIfydAYdBui0xmZ961eTRyqnNdSqAXSDAq1xT5vMkU4zkxfoF9kynQ+WZ9eJr

8MM7t4JOUP+K8Y/2nc5m2W/YLym//QD+pwBb7XC5yo9Q79VKzpW/6aEgPh3OY/+eUfqRHErqF9++fnf1

AjH++/Zw7t3p9r/xv2v2H/nwz7v/jpl9SHKadOY6cK
igmWRBLMlepdezdgIGGybTjlgQlkWTLnHkM3IBSUq6RK6Bbgs4c16xVP5d50o6jAt1NJ2T8mn9SCW6le

C88jwRkHipMHe1SgoNBFDvixKRxI9LDlh2lRJt+dwubxjHhgvVbtxhb5LBrBibYUuMPo6Sa36KhjldjN

p1wP37YgJ9bWc59GtvGtbWCFzDnwYeh0OjkzXVDAqi
6JfkDDK2rW5zmYZRg8C0bFfWBBrLJ8fUe5AC8dWtPHYJYN3pRKl8Rb1tcqUXzY5jqqA8Nh1lF70A516W

8DkcKQCLUvXhu5YaQJn82x1LpSSbQIxtuqwwrs+8fcxkadUMOBx/Qtpvg00HW2wSo2mJ4BfhQF6v5yc+

lw78tfcPASkhQ3ADOemhKHNjXjf7ktpbHq3jOMCdkf
Ki/EAaLauHAcVgfYQGxt6dsGIy0tBATp1yu6c3UU4sgENa2oslV+dxN/4IC/uf5jyBZzVk791AsSjQWE

oeGM2SkCH3JWR2WK8GePbXFJvJXStoz0GFHD7yEcfTWJVWZujq2hcux8Kgh0/DRNGjM3AO+5A1G6v+8A

rsste1qWWmyCo+DzqErnXOUK2PdvYmFL/UEAhBvd9m
hADoRqIIyte0otTWMwvDHPD663doqYhOBg2ZujxatVuL/6YRdYbMK54u5avt+2r0g9HdTgQ3gEXDhexm

h63JfVz4Z4aJ+4ukGRvME9q1UFOYAqy3rrhKXdiVgqWKp9DdUHSM3q1iXdEnlMcHsV8rdZKXjzphnEgL

tVxgVsZfKw0aGb7lG+qCM38F3/GIwC9LYf3sfLzDR/
gWz/TsHdKK2HImgP/XVVLGjjXiuDUXnawmO0cJRZ8WdkLdx9/nw3hlxjaDPJ8+KbFJZTUcDiId93WrtZ

r3m68cACwzZWkHf64rSdqsnzwfXPeJG90Esl9eXCW5gpyG6AF4rmhQzLMi/9bEGfV8cKm+Y1FqBZGFNm

K0uPKm0bviOI5+sYem/qGhu17MQcKlY6oi9m0FEF7Z
HXwmGoAPMGnDCYpLsrb7DI5m4T648xpd1bg2rLxzL+JjUnz7K4ULezfbTO8hl+KCRKrGu0A02G0VUZp8

xFbww/cDwSKA3dBbQtl1jOaeBTvT/KSm0wVgopn3l6sXkmetwlmvijTZuPMWCQ89bvFJgqGnMaLxor1j

JHDzqQWD0r0CZRfkgkhXVoYSauzTo5gk8QqAqUddDV
0jnHu9XYCIPetB1kqdWnUeQn3aNVVH+iNT/4yWinBOvB7WJG2xm1whc96CA1xnNdoDFJa6sku81VHNRT

ClGmrL7pRpggO68Vq8HkW6LmVhbWQe1r5TmgjNiXWDRkMqqGdVPEtVfpiMiJN4jxVG+qmoea9wXX/Sg9

plB+3a6D9klyKEHUsG8gAwI2nyBhytoVLaCW7mKSQ+
bKex4M0QIsRuWN+yVoqUR1a88I7ufs5CtvchHlUSsUpFNJVK7uvtiRZy6/Fycalzw6r4TPcZJ8ejnrzz

8P6r7KjaxXdj3WONr72Z2MWZRkPHZs/Hdp5fSI6elCMoCBlAUgEkNKWY9GMqsnEuDT8TfWT1HK4bUUCb

QEYbUzFEnZncSq87Bij8SqZ7vddtYOXZADWksiWxre
tOGtFoJ9XEPqmb5uYQMJTLhtYSq20XxNl6BLcnwrlGv2sqnci5UhFd/K4pHJRvjF9JpaCWTwUrUMqH8d

4LGniMvD9kscMoCuCxDxMv/YKLqMfqxKi0ep/fdZ2zJ8244bbtfWMMDTxSYAcn87TC2U3Qnwuiopi8HZ

c1o28Wnnm1LL7TDFEu4lzW7aujgRSRwLIxrvzd1aHJ
xfAN8a0SiLObwneMTx0sgldh5CkObC9WiZeEbGcab3ROe07qCOPZe58kqUL0UF4K6YNQBlJBDHZWAYP2

g3KIu4jo4iUJH67kBXTDLspdEM3RBjHR3cxK2u0omD4bYhHvT3F2kcoC8C6nZG7l4IKjeYgaE0O+OY0E

Dn+190vp4ItNRiWn2f5Znmm9wtYFtV2NGD6gbPAY7E
DMpgq0Ypclk5FeRo50Y7Ki4ZKasdFLsGsdzq3pAxhF6sHwJgPSTkCnQHag00GACpGKFTVCkao+kwVmAD

FulLpoaO3hZY7KcJEP7e03GhqGN1Q5HOGHKccdEFgu8f1uMUp+ta8wzhj3r5UC7U/y1Oyxy/yjfmNrtF

nNTyKcnEmGerWpdq1euawea0jLKQ73UbXYTtusAV/E
Xdt0nJmC1RYI7wHIRkXeOqTAVll3o9Mfnlr/oKDI0QCNUUl8mOLNxV0agb7dw64neqFxdhXNRNPVHprl

btwyDIAzlhpFpXpOY78ee/9MUHDakZTnud6zxrwXqqqaq2+VmzhTJL8BvjnaaWZJAKSX9GzJvHYqPzd9

saKcpFhWL/hKc+ySEN/gx2MaIh81v57lq4G8GGjTZr
ofX7Ub3cEWwVfbV4ebXemN83c9ba1LL9a8XEHkqHhgUceDnmBuNSX2kbtlZAbHGKpbZ7FccHPCFk7vDB

bhHcdl68/XRed7dIVUj2q4Gk9msLOy1cVSAxdVJ2JusjHa757IYsTxsT67McwJb3OV/YePJQc7wLZ6dn

m53D/f3zAIau8b2n0nexJb2v3NFI8CcX20vNnJG5jt
iRMJC4eHPRk/znjhdsd6WaUnjxYowZ8sxWRXa8KQnF5uDD4+jFuqmkPYzjGwPpjMILobSJZ0ErfTMwpH

fD3bQfVUfxD9tbAaF8cB3e1u4Isy0U6ask6BIl7SK+qSwYoUK68Cpv17pUJHB4Yyd6Tl44AskV3vjnut

ovd7mZE5O8j2HWtzjclvwrCcGSfzcg0YLP26tpkYPu
74i+aA+kR0rs7H/qEscJigXVc9lHCJpdJInZbcxjMFdp91QjSLbBre4dngfN04QdqiePnSPjmE3VL+hQ

fzr5GxiJB12L908/eC/kQMFVtQbVh3NNPoaQpCpN2Ffoi0007uRxwTN+cv5SXA3rFIfEuJD/PgUUtMSZ

QoeqQzAIxkHXPS75+77/1av9ErPSi7jxmGQoN864ae
aUwV7yaThpovAVEG3bVXFQ3DNFxQIic5jH7NQOh27Pv6Ur6FtBnTJ5uhcnyGBZrQl4Q56UuR1Nj+n7Zt

Q3SPjc4DehVOGK0b3IBHeOkO96zg/d8lJCbgtJVoehnBxWdfBE6APxy5ZsPkEEMZaiUsjr9+ousUlj1L

KiSo3z2IXmN0VsG4IfdxGCDj6hbcmE9rKn/RM21dmI
mZiU+bzNs7I5xfI8noSvHIRB93ctVy13t0ZoCjtE1iY8okFdXnEzNLsNEYw/jmubfvu+FL2h5GS5qihw

Dw0RSS1pycZIFELzYd+OT3L/quhddrEGgXuX2TZI2ctA9MmVHA57VGeeVMOXT3tWi5YFCtwhUFjHGhng

rPzsb9qAJiSimXZ6nKi94KjbcnsdykN1DWsxC0sOJK
c9rdfqPK9+fopIrCnhrC1XYPUsbCsL8xczCVRgDvuWSAdXi9CM34tQU0t9t8FVXf2K8Et7IcClE6RXa6

nm+VfTHgTcFYcUlRQfJELJDRYwawZbcmvtele8Col+epZpFOEKcu+jIh8/lF8otDij9Tz1zXru6xkvPn

Lwk4hTJ4U2nwE9DsAuU5MgLbmdbA0G+bB9+6a81X8N
c5Q0I6EnzPQzNKDjkph0PqKsrBuYf9VJ8fiP++DOqRDzUDm8aGPRxSUoDPhFBVwX9VDLqAlQxSFELeYY

Tce2Zw/JAVyzW6qowUKInNuWU58XwJMzWwfBR4FQzipcGKDVor/lmNkGEK+uXGEZc8o/07aGyy4wd2eu

ezhqrzYhPOV88W9EEKspaIz94URGoGRvGOGSihovdx
5jTjnvX4QwIupneNOH660nzxpjS1dshNm1Lt++oCsUL5A0v/GF10Myp/f0Cwb6izLI6GECSOg4dPm301

qpD6sUO5nY+rEqt+e9Q22um+Acf/KdZ0g+tew6CfqUP28/B1A0XMZEhgnTEY72ZafTMUj/ugFcUJBGDh

E1hxPDLl8ddL0lyF/fClEsthl+tMOO2hquQzyO2Ixj
rCW/1xD3A+pPmO1PQtrIUnNWMPKHg6SKTrP6C9nhVHco4WYmAdI2hpBt3z9xxtJd0+zNippZYnaGCHDk

/IMSy4oufUZB0lI1fWaNa0H6H05EbtxRpxlnhiMquGpszLU3QeSo9N/Quz4SuwSt3rmZu8BduipOO9Bm

yMIsxFiba3BY+9ug3KxK07jYU0rs47t26x3e3wHboP
33Dj4OpJWykDRUzSjESwA4xp7EAZagRj1MQ+dr+y6onOhf0Gl0ToObdWfmV40nIgPiDJm8Ks02v/KdGC

Gx6mhZJKYpUxrt/EM3ZUmroSz9KuX2vk6xXJjezHPC4RxYUwn+68kI63HYPxp8LLP61G1vgN1pblibPp

9IJ2A4txJU1o0ebzxH/jACzVnVJuupDLGCdmZBPHuM
dZgL5MRW8vfV3cIaFp7JLmuIt0OySNYR4ybPGL8KSh+/yUlVE6SQUjdWBiIts+8Rf1UnkGyS31M4YdQe

ooyD2tb5qClwd7tf3xm8zsVMSHqP6utwWH86Ygde8UVQyEvjRrFSn5FPdG4i+aHNj+l9yqQ+PFWJGecg

rswnyBqgXOelL32rha/vR+2cXTbJhFajS6gK+x6Jtk
bCV5kpXHtAEUbODpGZ+nHAdBa+Gz9h4W76YEayahSHh2AmCZ2civZWTl0+M3Uys2J3a1/M8o9JqM8kTX

mBpTPh9EmDJeFRnbBUSYjENilpStNZEeHRV3IPgxgMoBIJOoGT01r5NTixmQmP8j9bpp9JaQRSFD9zvx

i6HG3Jnr7HbOj++VALbAHAUkexSaFX89C1e9FTH8HT
ju39ZlCSRyIuNoFI6S5DH8+6epFD0HeUi+c7JzTJua+qcaaXHRqWQ0fIq25zXVBAMUwL1Vn3CCZ0pbN3

cHSMmvP0djq5JUeWPfEp0S9hflVnWy9CUhTKO5uGsLnYtytwlk+32l+MZaqHKZDyKSSeTvecoxmiOuwJ

dVZbW5rCabqffTlSrBpTte+sILjb7ooYNDl+V7Ws8a
aUcuA6El5E2tLUDt41vTt/wr62V3EQhYreA5zUdaUHYLHWcx3vTAFjReInUWAQJYajTqJcNIBOHIMcGn

KXMWWhXK2MoBYIIBT0Ox/v+v1j65a174vth44TfYnr+/5ixyb3YcSm8zRnt2+rn+3hbF7J82PPGRcVXl

Lb4cSloA5SMMdV8mZLs/IcE233+5aOm/CnjSjj9C9o
03aAdE9ALrb1Z239cw+6msFmDHTwYUViu5/YNba9Ybx8/ZPsHGdAqzDtW737si/MjUBZ446r29FvJ2oK

Bsqsiy0a+zpaaLXFCr3N5+jcsf6qJg3NaSbBI7VRVfnFr6bm2FgKLtC6GHOWProzxQVFXkDADuO0Uvwj

LtzyoFn8w1eZhLH8lxElWdCp/5ZVEUSMDgVzWgs8e4
V9P4NziDWi4tCG6u33ZelUl6WLAfLOj9BuA/NeVE8EREXLaR6mnL1+WxPs3uaCiHp08FEnS6/ZMvi6nh

G0R6EZofk0+mUD1M0l4ZFshuqYSwdTTHPruhuljaJH8g4c0XmS8Z0Er1LNwox6cs+QSqLyrz0fguMRWq

auyYy1TvQB4DOstBFQiQ0lLLqWEbzq2lj+TBUuRu21
BxKx2SLxb+VhixF6VCQMRLFUX/l7AdvSh3n58RhYyGdytelNSY20p7VltfPNVOhwdyy7Z12GQ2JkkQX7

u1SUBDUGZvygRYC2Zev0kseFmOHoT8tyIo/QT6iTZjrolbsywvq+O+anKR+XunbDcfOwnGLZ36a2h5DY

i/+bhDgir6QyVzPJ4kqD2QuU02Q6sYJViHtadZYkNG
65ZHJHKKGe1tpq7v5TC/MEmtivFxjL9f9NDSyKP/bH0KzzF5w443QTU8o3kGx8EM5lqZc2FBSFngCLfA

FIehLRx4fEU0OhuZQY3zrtXe4tGrqykKuos3H9R6BlRIsk8exJ+3I/c8ZpSDVn3XQQeVq7AGSa0/fk57

g/wOGxUwH1T/fOdwWM6tAGYH8eLk/3kJz3w/Mufy3m
E7+i0qtboq2vBLb3Iqkz8UIst5wtebAHxeWBq0PeS3+q9T+X8vwsfZZvL2EQnQfOVCR6DVgRtAIqUv1y

FpXoo3OmE6Ep076gRCf+HMYxxkhhoTyz7/3oQqK55M1eG9hBz4Bcr5QqRXeCa0zxTm6LHUyBqOqkwr5o

aLHymHeC74u+/SDJVKlxC3OFKCJf72PRP4OHxGDMW3
lKpuX57WihF7UVNhQMuCrQRemKeyrV+etZGL0hnQI7td3YM9O8O7MpC1wnQ/ppI1uQcvsumJMRJ1kWsl

kG5UHwSdVgNxWnwwisJeyzhyy7p1mvpo5f1gL44/belaG6mio3peZv6LVkGQz9KVK43Gza9SIqwLqFn6

reIb/l7nfIdE5tqOhaLb3tfqxGimyGkEpJn/5TspxW
xnqur2+VlY1ICo0vCi2nhtg/SxsvDELfxoFRZ2JwlwUGLSOL2AJlwuL8QP4Q5Rk12k14ZPaVNZVxsA56

LqRR8gJWsjtJ9MWiPweQ66fkswPgp7HKhm6iTDI5r6Kc19L9mpK7i1zp6kCI7WMz7yq0K+jkEn1tnjzx

7CFNSRAH1xtvpCtiUXZGORrMsMWvh4B/5J8g4pSIeS
DXKg/8fecaMFpv+xsQTXaDu/6VCWsyKmwKJLRRitpda3TiDV+1OJEWmW9UwjWxo4UWUcZyM83kcygIsc

PFwgMVI4MRjFzrCacE5VMSnqWKiCK57x6+MnAF2d7CkGenJdjXmEi3E+37X6Oa7/PTgOfPGjmwPTo6I6

tVW0m2vOTkFzbRafNpTz3npHlNcBABLxy63BUvp89r
QnPWqvj2SY32fW+blMbZbv7WfMNA8CuL4ol+7avY43lKOQXcwSVfbzh8WGCeOjm0/0LfXj/X4eYlrlHT

JBq9F2bwVP/eeVJSccUP7jIrmpR/tKq57cgg4P5P8Zkk43tudtsCHRF2ugFGNBeCmgneZAwzHadw6bu2

tFt+4rezysO4EXrWM+v3KEso1rGBSvse/l8rjkmKaX
1K64lXFIym/ibm9cQv6AdBZvH+16PdBkBnDdaPtxtAUBSBf7yn4whwc+PFFR2dY+pbWd/nEM/TV1ZX82

apk8aiuh1fjDwDAj2qx7vuTwmgucCr9Gca1B6K4VHBqj9a6Ga5LAjhanQ0rVgOeTy3XYfarbHxLlBDMZ

Ck/ehQBklPkGJciCzKDKIYkNfhxPZl1o+cKtgrn0Aw
HE6oJeK/FrPJCHENqL3aU+e1aoec0ZZD5PEij85XJffhEYOG7vmXQ8lNMebWXZlzzfjYNRTCGN6JCXGm

1akXLe2ODJoclElElqB5FcYRMCeell+E3qsDAjuWkpJFlGZ4pSqGc2W4K+wCDzYh7oQJ4hAS5uq70pyY

68HdGM5UC8rJay2u8VbLgwUYm1TXtZYgQaHeLfplgi
oKrNyAM+vQZt27hEbclUOMHkq4qK5E8iQHYSABpkkEKQpQBGWx1gd7KiJenPz1Dbm6rBrilqMCO8E4/A

j9orZLm0eQyPEiL3UioFxsDyiRay9Ql2dS+l13R6kOaIA+RZfgyjqm0p/UNMY7OBY5pkqWUK2YcHCT7w

zqxNOYq5WRSNZDu5qZfBnNDId+T4xIZYUI/9WJTqWR
ePgWJjum9kKnPwr07Br0p2RjQ6d6/MIJ60OH/HFN3q/22VLdghqBxuB7cj4FmDhHZkyoaiZD8eUrsteY

ra2eoC8IlCp/fN5dtcwmmsU8HZ8ZzQwDwaT4QPXk1AdSGBWtkQCCN1nP9ZbE/PFzjjVW37G2MPbOau54

Jfeysavud7tYWPU5n8rdsL8Eb5Od5i16c3n7q9SEP0
pTgktYQMb0ePv+0iB/0fnVjXJy/iQcrvFQ58LsgMKeOCI6VGy+l2t2ARPBNSIaxKTfoCOAEpzpjeF2vG

lb+YrXO7O45xKkfiU2+SlphsKM9NmPuJiF74MaHBoMCMJI2D7XjoX9v3SZi2KBEr/ZnizjcwbkdiW9gC

hHZqPDhbu7V+DTX8rLqp1jrjwKW83hxs+O5cX6opcb
Igo3A9DLJz+1E9Z9OQfmzw6FdwhmvH41vRlSnz/VVY9TaITpo3TTtWkpmdYXMIH6lBHY3n6AXQhoP0B1

3NLBQYT3mPKlg+9eSeh728SilLG/nkJRsTU32oRtkbDW6zgr78Magw2yGx6pj1dWM8+5gnC8MhxHx/lg

f0Qky7F8/zphuPDoPPoeX3EKrzyekVee9fepxJVCMK
qendP6KfK86w5hrHeNpPzslkkx59pvQVk2UkCy8wbCU/wrv1GRbJvITuKsxCq3CnLC8vFb77gdopwZPF

6qe0fCDQWIEx/P64eiy8SjREKgU9iUgIajrZJgAw54GhWtfonmJLREVc3EUJy4SKAU+GuH0VAlc9qeA1

UYl0SGYw1u2wmPC2uZSmmczhPPeWnzAAWtZ86ieUk+
r33izivSSr9F62atLqKoPMBPpv1e89GS/TTJqR3Y1Zjlvj7CJLgfQtEnCs6624AYsho0OCwOaJJ1jd0l

M0hynqLmaX+4HT44OFmnYyOsK0+GCsmSSwTe8PBbSPOjCo3HvvM5Bd7xYQdvoYiH2te1uOH0GN2Ia7Fx

22l27zlB1LwY0pC9Nmc6KG23F1pTNw9e0aFNlhjQs4
4d8XYqd0GR3jPvurJItkAp4BAn4E9L6Rp2qJoyr/cc9v1RmC6nbRv2IFj3xPP2JDPuvuAYwcejRp/Wp6

heBWfy8R/mQ7wKIcFAOjSV2vOvSBQDHS7nna+WVaq5vu+xbjQi1DzobGhdTdyxVEoDuKwFi0Xsl5GLN/

oJ2cT2hzg6Vlv0ht3y9l8AGYrUvAB8WF4wHUW6lfVp
caWxCc01+3gNDYXktHkLRr1EZOv/ZcM5lQTHryci8B7TRSdB6+0Qm+FqIN7es2WpSdYSUqpil0LSgydF

f41wUw6g/aiqRwHe3ZMTn02mCavQL3PsjHA0SawUQo9WkYNunuWkfmlKDbwfWY1c1Kyq/sYiT8mrEynI

GMWn2DdL9H/XgPTFzSvX/UqPYUdwh8BnFtk7o5Y8gj
phmT+2ZE1/v2Dz/YnklNwMm6xw1usXNaEYG8KV/DgzZrkgUfEbCTz8UE32zFoKdWXnbOMAMvxohYKmZe

HEMMh3izkXYNqFTtmmxMkHW/8tpcZemKTxKqZ2aYurFBtIIAu6UYBErfT/FCoasHKxD1hrchK4va3VEi

JUguKPE0DEAglZHWeJqBkxtsdHh429o8TJG7Ze8b1t
JfcJa+RSzBOZhiiTLO2sm2LemNJuIXNXL21Lwbr4PmwAROvx+HNkSzxhoYzLmb70doicGdAunPjSVqrx

9mip33jpfSWT2Ftd3STrttmC/xT9P3McdfK8ZdqZCXivv2qyLdmdKHmGG5dFwuRJxnyMOiwR9liyThJ6

t6bIHpzFPrFxSqDyTwoR6ozOTOVuFKRs1VkBB/IY6D
32DNW0lXqp93Rdwekq7ZetQyl9DB77KOCmM63HfKHAF3IvKpqX2f86Ua2ArbXJIDkmk5vMlr/HCH//rx

WzVzrDv9nsUrHi5trOZuzA1VWoov+kEaRt37vGuAeVFjsc7BOl/Avv1aNGvlY+IGz8fzQ1cGYJL0Th41

amNhAl2b2WLF8fR5ICygMRcpzZ/FF+j8JgloqK7XDY
YVCY6RkcYslgwxwI5XGV5bvSJNy3MCgvrXLAKYNf8flj7X6ApUq6KNq963GPBwpHD2MqUhzsy7gfZL3y

T8qaL0BY4X1jcwwWGvr4krnc5lchmcc6e1bfnA7fBu2SOK926QEScgI31unijVKXun0LycFEzhcHcTem

hxbsKK06NgfFubZ6tcudBYD+7sZtW8gb5Ts+5nTthB
hW/zENrMzKQ/zn3PpNxosI3G+AGXO0GN8oG1eaIV4FvEWCslvbN/ywQJm0+azQUhpvN9rIJ7lt0HDsp4

nuPhXrf6PUyKUqjHS8YkozuvsF89Fs1zCQ3DKN5eLnFoV6Pm0+2GTnOT6l3nhwLpH4Qach15Et4kohCt

vwXezRMbd9VpBQlOzzUCq1UFkj1tLLqljN3hVfIeMn
G/nbemWx6enIu8YAzVFX8Ypre7O3phpcyBXFR4Sw3Q49TbDp+gDoXi8Rxof3Jw0Ruj4ubHMnXcfZViia

UYs3F7puB115G2S44B8GozZXo8UUt3o4Hc/X9t8wttD42jxx6WfV9TiTVxjSlBEG65wE8zF1RWFy/aod

sWPG1Fo/cVLkknE9RMkRitRsu4fVUIkNgWIIjNGSiV
OQ0BNhid3I6sF1y/aSlfAQa1X77qpH+jAXJ/ujJg70Rl3vHBGTxSYf3oJKye9UhDmVgwKbK5NMLv+vqr

Z+7vT08e8jttp0YfTdnoNSOZBhlvYaQt+AOOfJMBb528ShRRtrZuwCHTMP2PONHEIr+YFdpijqfuMJ1P

tdlrSYLY7H3WFA+SICbVxhZI+AeWDXS1aam+iGL+tt
fijrnoJ4jxlxH8XNc5l1Y3/ifSn9BJJG8SxSb3VYg+pi9h75wXnRKGP/HvrHgoeN2b42CaMqOsqBME9m

9d47D3/ItlkuqDyHyock19FBnnudAG7wjmtrGm6xUUQkMcCKOaNy8+vFBoBKMAibPSZYGMvOypc2r2Ao

DN2WEU5H4oxWWIDNoHiuUE7H0n+6ZO7/3kOID20Cnm
V0jGmHwDiErGgBzJdX2O2pGevJyuj4uapyHjIwYrRQoIjsL0y0Wv2bIQO7tYl0vEcns/MIOCUWpXsNiA

N/PkV8Faw9cjTTtwOGuYsi6bZklLsnIhr6q011EsKAN/D4vKJweF9Pn1QKirW8c8+vDQR7WGnlS3Qp/M

Gco/v73tjdHbDBXlLCsQp/0z1Q0bP4pWkOrq8mtvg/
2DghWqmp/8PWYvXq3kpIJO8WfFXCR4VFOon90HAWk/zej2G9LT4KiAWFcbPtjw4BeNclGFnMN60LbE07

uhMni9uF3K5ww/CCT5vYwyKoa2pV2P3yWQuV4i9zG+dYJV4DuzMjWJ/Lg+/o4Fi/vahUS5uGHzMIOz+c

94TWCPuAigW/E9nIlW5nu+eMDIvgvLUClV6KuK9tgB
1tMtNQ9qP239x7lWS2ufkF2D7YuwMiJr6KeJJ/sWh0Fr7lD9M++5Ky1zwhQVfb7/4NBfHUFBI2dA6rAw

jz+A2OuhxzY6KD3eQUhH6xKLj392GyfhNTH7Kl2DRf70tQjw+UiMPeokWxOuL0prcFRgrAJOpn3N/Teb

5xiqa5Y32ygrFF/YKsA9HJy6SjdRKrzQXeEP6MMQYR
xgNvmF8gE1xP/qJwfKcHC91fn0PvXyzCVv9U76pM9/BkV9SFoacPTZF2QMZsL+M1Sje86dG+j2606gAj

5QXNtL8/rDcec5eVyekQqW155JEXWLLDdvRlVC7AwZuOWHmGiThXYeiZX8kXQljqLHAV7QK2TbduecHV

FVX+v5fLxY5gybIYqUdYIMk+QMKrByaa3FQBS7TOnQ
tYNkUzAG79DpjgiHyHHE2JB4pys5831To1vPrPEUEj4joYGUq1Of0O3OvAKPAx+mDVbAGOn3rnTUB0dz

+IQrzI+TghRTblcvd1E/qFUd0p1Y6v6CNjhV96r3SWJxPGYEHATA24l5zcdC8G9kAsbpk1molABUOvsB

J1JChdV8PW8rwDsAojQp9hcowBVMxDohIpXchfX80h
s3k2Z/phWXW7kACLy+znDUw8g7yDnz1aeTdbglKFfV6MhtIswHAtnumQO9zMLBhjhRmTY+69RKV4bgJT

9peEBhg3fhXm1dwK5no1omLju/cs3KIMJ8JDjMYbat8Dgpg+9nOiHAz7AKkSl5A0xJpKMVSjm2bChRMg

G7Y4ow0+2IB50WlD0BBwwPH6Y+1vW2O3PJPJyhVL0q
RUhH1WwLI5A9qDXbNiexZrwvdph7jOXGLZ/LxppRKHw188+w288DWA6iSAR1XFKvKuvFAPmq7iEBNW44

KyuHw9aE1WAV31D2ScKfsuM1vla9gfwx2tfC5ZCoClu7tiLJ/uLAFl8g9ws1pbunpVKNrpt7psDQM7k2

4eA13pwRpX7YvCM0yvQ4cPCKdVs4PSnhT/zhFd0IWd
bAA8zaJuHVXvpGBZ0y6qcfAzKYan1sCiYxFxKuDvFs9+VXu7KzxSdXoON0LoOVYBK4/74v6PXPwLVFWf

pu5CpD5PBNmSeN24LtQCjx2d8DTMmA3eIpX1VDQuEgwLxQfwBbg1Nh55uYy+q83cC1HhIAlVDeIArj/0

nUzCfvv2tKE5/M6Tyddq7e69CKfiZmO2vudT9WCGN3
2Ewb6LuqJQTUxhq0TL9qKDug6YlZtAM6M1x5slE62S9Wv/P2v8V8BqnqlsteB6hB7IedLk8Fwfwal/a+

YO7LEeUAEqquk127ZJs+WUUwAi6JbM9XZF3z2/iG/r2vlicoZybc0KTsura3ZK3fZACGpltRkcb8NHSl

pTu4bXZWaR2xUlqUm9+08s++c7rF1fyAad5XTJv6ea
4awAWCX2tGIRHMkgC73oUcej5F7Yf0UmA9ZpcEnScFf6aINIwRa0a7sWk5K9WHm3LgeaJxCtBJHuXsOt

/yJ+l6yVuwMGLQb3bnH+Kwic3W3eil3PBKgGrUxWOtJOfaid9G6/R0I9Gjlf3Lfuay6DO0QfG1us2RYr

phXi7ZSoWluwLR5+dlAWXIiPn1+d/ZWoUSVwtI/TRM
BwP1xq1hq9SgXJzH+P5a6yvKsgzzd3KQYN+YfWILGsIWTwiGmtjgUUU6ZQ59Jr5un7cmrn4bVOT7nXIg

W61X6Htd1UkAVvZmqLYRaTlD7oA5dNV5+ltJSOPI4pnGPPGntBBCTyxlQrvRDV6IwDpeuiLD5xya8kkl

JzIZbJIUWv58txBegT1hT76wQmRbm0ptdUrFbCT9R7
40La057DXGdgpgQYdddE6S+tu3ZJXN4l4g+6QNyuv1wp/JYE0pjgg9y7J/+ElQwQZ2ajnpQ69r19NaGi

y0OBrA+omSnz3444rBc6Fop+MMmn6W5AlfndCwUuuyBeQO8LlxAnWs0/i9L5mOySX0wIv5QVn++XtULT

4vFKS7izZlnCAlow50nEqcGgwXwlx/0YW5dafpoJju
No3zqQknILZysOO0aEX7hbl/H84aLlcXDxsamX47pblTdJ0M2UHrqm+7anBC9ru8fsLS+xGfPTXQmzMI

N81gQKAOlSYUyLFbFGKFT8bEjmzM7RebkNIxJr0f6P7a7LrmGskGEGpxPTBzXABTNiNMm3TKV9U4FFqS

e4skqc6OKuQr08w4cN6rg7x+aVgybQQ2OWXogUrgJz
oG0Pe7rmhq2eVehZcCQ2ojf0HrnrXv6zhO0Thw4T8vTEd+H0iJgqCbsHOH1WmCFlTErvOze/R1Lx+wyD

XzpLwU/eDdmBquChW8rreUxh6kv55WIs1rYuva4s2Rj3hBk0Dx2+QQQ9P1OMwqfdAh+5WT4zv0X5YmpA

+TDK8wUS0KXxtR/1HHG1TkpbYFmBtbHxo49KkEY+XC
YUBYi8OwqEc1kIk2BJH5UIUM1rSmRGbH+eCwjFXmDXGYdJoxdqlnpoJaRUm65m0iBoNEqBGApf9clU9s

EXOjwziC42AG66xb3IxnHNepvMC9pOCxMRk9u9sX+xYScwcl+mDpmLl9p7R2ZiWWugnjE6eaWNM0HFnU

l4w700JSkgFBkPlRpiVH8igOfzNjPXF3r6+DT322ya
GDnUFwtOWj0gednyCYdZPOWwbdkdMOJv46M7QeYtrvBFh9TYiy5kn7wQIz9bYVAaGGz9RFF6zaZ3VNKx

2hm6YlP1eMW11Pce5+Vyc6NDnU/B5P5kcSk7G61QmJ0ESVLVeUFznIjVN5eblzy/GR6H+UVQXPHZANl5

SuNpuZk0QnTFPe7OM8kKmVljejhhLkHRMOe4fAMRrw
1Gb+hWdZhoL0SEJbGjnUG1g4/dovhRxUvDrfw7njcIiewZ6kQxA10sUBUmcE68yvjfID40qSBImiMwZd

mosTf6BurW7uImkfVIsVM8ma8pneodWilyn5aer3RWf5nVwu1FvavlWDe3c58PKv9FiQ1UR31c8QsYS1

SDJdgncp8aqGX3a4AFAevWevmJkv4si9o9uvX2KpVC
bQGNxroob4/Jv5Mw/6jsrk6dtovzENRIJtXUd49+4hefZXYv973evMQUUs49Pbs7l7weYIeLtsSwRiqB

CyURvelz9qK7wem3UsrQ3F0WMglfoDuZ/xZqwT4ep/sfEiGSkV48BXYchrg2d3BcSBMXFi8DLrzOgRA4

d9yHtyUFDKm10sCSslvJkqqrEU+MQyh8VRZpfF1pXR
RLCNlmNIvb90jyFXnjUbr54q19vnIips4GwigdQcAKwpuOITrtAdi7UxsLwfvIBe9YTkb3cjpNh7ZfeK

wL4gg6zExFfQplp0oMuxtOwJT4OXHPqgxXrmJp8xkQr03xPJhL2rXIQrwsFJ+p1zLkNGPW3WvDi6apkK

3N9UQrfYSLjivZ/PWKD3WMxRuXGl/3VDRWLQz+VUee
u8N1HPCwF8eTmYn+Jx96oZsFp/RZSJW5gyw8loQFjpUowaCq4jME+U3kjxMw0UL5T94mUoSWkUpb2M8U

Gu2m4vKOd1Ccx+0hkxdk9Ke9mGWdK6q8aq5QBLOMd45sUtXLHkrYOvqQr0n7Ajwcg7pcYZkiXPf8xUD7

iA+XW0FIOryw6gH2mOTXtBb59RzZqcFJZN1x1nSGkA
bWRVM13v14+E8ANTjeVz1pRwFCybx5xotkB/Rb75V9r+8Dmpmv9ikphtao6GU08Kyy5dxO1txqzXt2i2

VC/tH6KDzQC3FkMg3Ct8+g8gctC690GtQKfjvIvEw8vE5toNA7JCgm+VHrVAu1K0uUjYp+n6+HRpl0u+

mCRgL63rCeUHHfdRjgZuioo+L22iI8r8uIn8xDUNc4
CHoiqoHl6gr+rbWq/2h+CW2u31/9ATPM1h6kZ3qBTpvdAzdvAD8kBgpTe+NUW3E0B5PR6Dx/xZ9fWmpj

MCQuP+iNeZQ/ROtPL6UsdEtCf8d86B62K5KvIMaEJSpOVZG8I0v1eeqkdWLU6MyhcPVppjf40ZOHIjZK

b4ggtxwYVGpFvlbt0r+RtF43tMyJ1CsycxCxLxU9+b
870j/b4xnjh3KDO5KigttGpQGtL0T5TpJ2hI+lMVUzeef3A8NKUutN2H+r/cuKkSKjZv+hzwHNWub/Tz

UCZuB1Wyu27A+AfwP+DfjfEfB/2ElBaeNAo8LoCp6pzRWSyKaiLnzFYleadl9PgKEJqF3hOWqh6aKsRM

5fNw2Urlsw22Vm5yt/BprE7wPpW4aCIickNJfXPd89
Szx+0EjOezc/nsz3WQmL9yLra+2OyzFZ+sU9NoBo/SQi5KZMpbB7HZmML+gUHK12L4ynwqHaCo034iYz

Ji0w0oJNRJJVjUgtPDPp/Gir9SU/m7lexOxPGTQvYr8zX5e7Vs/W9/Gs6Jlqrb0TKQ5q0d3ZJmUx6Ytd

94/5LeHFz96psdq18lkKxDfwmd1PHyxX1YN/aryKXc
pTeMrJnBJDdcUk+qbqQmhmKruGOxipe2AcmEE2t8vZ/HQKOSqfjX5e32PA82qFc929gCVO3opbF6XCv1

s5p/FOXmxUh1PXba5Kg15zm6syqgyPAQa9amk/Nlmjfhzsae18FUCNXWCx5libvOwCAcj5UU1nIB9flh

2fwz9gOMABfhkalMkGiGPsBZl/8r6KKQ43qdyYo1W7
WnRKWudMDmYeIvY97WrWH/AUgepmJ65YeO8ipT5tgVC1QDvbI/qTefbZNZaNV27fVeo88p0oWN8/Dayan

xkDo1xius4Rt4En+OWkOJ9Qf/YJYGUfWz7XhKtbj644I4ih4BG+R8E9sWjl/XGZyZwhWin+MNmSNxopO

Sl1ur6Gc5sCPyu3sZf7YZv9UAe7X7CF7T5XWtb8xFB
32Oug80dSEvsQ4NvspAggfWtSm3XhUyD7SNrDL8mAAu57JyqUYzGeBA6uIF2Ge9WhOY9DhD87rzAth3g

4SyIFo1Rdov0n9c4UgkEdeXqhbn3Tx89bHD9+DSGH+Ypu2jVlU7zGbk02Q+U3Hdc03F3YO7LL5+JBmql

jmU5CInx1PPlmYGj2ijvDUBrx37JHJ20iAh+n7JNG/
urxxiZtobZNDE0h9XfdMI4mh01D792KJEXLS0pwvMTjc/FJ+RIhH9jPH8Jka4fzJiBti3m4UinHYiJrT

fTAXNHbyT9q7/jKZoRVQfZDcPumlm15TZbPSVbl+0ZlxMZR99ZUFt2OftjFvnp2k/pBEPAz3+ywyWJTp

H5SnVHZQSi/bjQxclJrqCMymOBeb07Ja2pmpKF/XIU
DYp9lIRVz/qqGB5n1gmgufx2E5ZNKj5gy47v2WeoMAT/9Gtp45h7eouB5YMEaGFa9PMPDXaqW8AG2jd+

F+M2Zry30uftaew641ggsSP2FKlLNpYxXvm3CDb3qS0CnIW1wh2EixYmzfif9yvt98UAyiluWQduL9nL

EiBOLlvhxdUcgaPVcGsAErntEI5DHiO27vNQXW0aB6
W5RqZFoeyihe9Op1yJjlAk3wkp7V7Znz33Mg4ln6xCkvbU4mrn7GCHyRG0X6rXS8Or/9eamohpg+b6tQ

QEaMjNEM6RpbaUpaF+bVU4Q9766dRz1A0DbtoaOJm9nIJvJWRN2NYqjW/DdAFxi8ooYSTy2EaqZD7Aos

12h5wM++VAAgXeUaQDELz9+GoKA9VM50MM87vSQMVv
0pQmOndfmdgsjQWf5IvUdvEpZ8StPaY3WVBh94HJPCkCgyXahHAncu9+/1/IEq4Y09nmoXMAhyZot94t

ckfCxA9GSBOEsDKoq4Wjk71Y2t5dQH+RxekWwhJWFOl0U+gg/WRBB2EBKdq5DnyTE/y8RTVVJ1huKCSx

vN8TwYeunq7em3GNFw/SudJsKcKAs/GdIuZtSZqSAm
5hAYrkR2/UYcvKLl4w9yNkw9809oIWdYs3cJn/404O+Prince/PiHpAAo1DSQIfb1irQwUqBoRYU48GGEW5

+/Lzu/ym1SAIXixFLO9BO0xAijLZOgXTbzXRRA63NpkkP19U6v5+gskUPNYPNL4QJ5YRhO3bXeeCz5+v

whjU1ExHVXzE0GLkij10Ncnq+ygsDvNSMnJn3PXrLl
P7Vc/0rmGEYhY7fjdZ8IuC2/gXJshPFLbgK5S/NVhKneyDPbXmSPxj4ksOJEbBxIC7p1pH98ui9h/V1/

tosFoOlyxlGnon5Ff8IWsJS9K7u8UgTF22gsT2FWPhaz7/Sg1fxpkpB/FWjptcPGFQxIe3FDVli+NqNB

4AjeDFtyMKaL6kut3gFet/N/ouTnyepO16VMZ3hatv
bAHScw57tYyDf7nmlB7lIu5BW91IefuCrG+2/rHm4wxYa8dcUZKibFqjSDca2x7e9z4LTpC8FkFYF5RQ

fPP8nzL4gd4zwZb7Q5hLnshr+REyiI5PzHHd3fqjhFsSUxJmh3aTn867pPIQs712JD6QiEB+CAHIL66n

x3FHn39ZIjuDlO0KwbPZZd2t0LO+GiLaDg3a6jZ51a
nYSoyo1dTi+4yAtztCsW7GvyZGq3/H67Bg/5A6VcQNfwGKa1tE3jssTHTmlRj2EvzezW3kc4vapeOQk5

2FHxXE2UNRMZp0+791hbf2LrExXebyH0RHlQ33FomDqqopDodavq/myYqS1hHcWnYg/C3/u+TFdt5J/v

Xl1n88HjBi0FgUf2KG9W9MdEJkaV8ksritZeUbzlCf
PRmiS0VYPyhoIOJYPBBCHvLpka4b6FgB6uzzm3jBSmUiPK8n4+SLHKRKORP7zbqAZTbaYVwdrdD/JbjS

0cFlg63TbQWmXb4r5J6UD4DY8L9poPHCVo81TecNeiMHWi/+Ga25F8Emg98Z+pQ1wEBJ2IlAisynn692

h7CMtTUCcXX4n4FjCKkZGt+02ltjbr/7EqDcTFWwlG
zUu3RZc3P/ynQoNgEdR5vEj6q3UMdQ1gXkNUz89OzFqNE5cKXMe3fDdVTp7UpPxs5jGwa7ogb6OgWi4x

iAEDKXQjyqy1oRZdC9a83HavipDm4zgZi5lzODtsW/BnL7R9nhUW00STZdic2h1p66pplBxBqziHZwlN

+7+kqJ5lSSgFRYzmCvKxZ9Nr51ojjbzbjxCnP6jQ9a
QiP8JE9w5be0Ad03Wsy24CWSIyoffE+Gp/FrdPP8jO1mtS5C2Uebgxo7wg/71R38EfArbC2QZy4HZ38b

x7/LcEntfUqftOy385gsJHW4otg01qutbZglbb2ccbc9aN/tQg7LKxLcYbu1un3PpNIFpRxegIW1GmK7

Yhq9ETJOGUbcR2tGiJegbSPNRBP8+TgTLw3e0T2SDk
1bUpevc6sGj+ujZtFGvg+csi66QYyNYeCo0Pg7CDm8v63xxrw9NzJPLs+Jjg+wojQU1BXU5p3r8dgD7N

txbgXihMW0ACoLp2u21tOC0zx8Q0gLTohnVGqCezmFCK4D03M5ut5mggnVlruNbLUrBl/wwLH5Ojxvv1

a0UmrdIGauncl45iUFgNNCyxFHEJeHjO3s64MD+0jz
2LcG85sTjw1flymPMQI7nUqHuYpAGDDnLjIwjItGhWRw6js8+aeC3o6hDUX6s6c7Ls3cvafVYUl/6/5s

zDePNwzxc9U6Auk2CM7B79FV8ayHgSuBuipcf8dT2kTDlveu+m6qD+6zNhHb3ATWbwjtgrDzjyCw+IJA

KNoHTXLUn2psEkNgbxD6hR0fRR7/HWXbmEzSIkOKOX
uwZtkybuSFlyjOuJ3jrzvoPHVJ/QcTtE8+ZFu5Aviuz+oIi5Y3qKfuHCOzYUq8+apNgjEIHRwKpshdDk

mSTya70jeapV95IKW+tWX7oWx9XL6ajwDa8qivCJ5aVNBvRKc/Ijr7/7JJxyNYChGI1oQ4GrN+eQTItr

6AmbMWwxqPrnCXM2a5Fb6uULg6+tMlI4fXQSW8gD6G
T9wxV30g6WJISLHKhFgdMmjaqgZf7NVa8Vo39+EYMCnAnHDOxwkvLn6d2MgE9BxgqhzB8cNa7e1vPFDG

xNnkWGjNQeO8aDowz0wB0xlLiTfLeqOfoMoS8pMGdsUjRiqO1+8uGLFGW4jE4l5Qt4EKfs7gDWIsi9pl

7GbzEy9kBaHFYA9P4DQCOz2QCmSdW53UMVYssmSNav
lHZBVQcA+bkgIYlX2RXNIfzfC0OlF4Fw+7Fz7v5A9avFxVmV8+O6YzY2l7uGYUXMWCAab5emLHnkA2Uv

86Lr7ozXBYt/COZjciGiYdQDnRvip6PaLcTmVIZ/UGCCbRHgGY7vq3Q2uT9ImScjM8/y3Uo6mJjRgZ+A

GMgfjmoGKg1DRz66rq4DKKYdYp8A6c+5c6qoimWnyl
tono/CdUkVYX8Cp2LLBBAKbgzwgUaUk0q+XCHGkaJ3tfplbLQJwjHnmPyXttuBX3Q+z5zAgnMT6RqooHa

RYe5zo9NtHBY3ByjnrDSk9Cp5/ijBOrMLgW1vWf5oLPI2h8t0G8xpjz8K2K3nkcASIbRj9LntAT2AK6x

EaWEmyPgQUspP1A5ZO/Z8JmPVNOK5HFRy7Fmjd51lI
lM+hY8ECYMOgtiTl2SKDzk1K4jtmapeL1hKaCiBCZ1+m6Qen3aGPjSZpkHJMjvmEhimM18D64ZWS+OKi

uehJUReNw6p1/0hLFvytsaPZQfjSsL2AN3uvAc6C4amjAJe6ZgMs9Dy8lWAt5eAA1KFVEAhGf1rS+OLT

eANof6VUU8VrxKF6Me+RvCsA+G4aLjWuZWLrbOzkO2
DO0WLGH98e5m95/5Se0DtFCxHpCTP4uXIXFlgD0YAbGdtfioMjIS0H+loDd9f5yw2vXPHFjcNEfxrgYz

pbDR1DGWVxgVjU34m/BdoLLwBV5BQJ2aOkXMxu5TOGznvTCDNR0M/0tco19AGwauqaAYUrClmLOxM8fi

cqB0rmbGxXj2mnmr/OMdapH4zIShmAUAlPY6zfohAr
FNv2/bFia5Rr2GWAxPz9sE1fHsT7HusL+TKSI5DcM/6r1Rg3tb/OesPh4YGAolRhhxwK9QkqXUDWBL5n

M3aIwZA6G9glW9nOM3WC9oryX2uRMJBTRECCem7u4RNuN7hNDUlzUR7R8xVDvM9WlFPVtSoL/TgRQ08A

0FcsNNopwEYyaUcgqIpsf96aGRwVkk+T36b3uxsbBp
t5Nk0kwtLEBGdVkLOhMl1ABDxLc7xUxUX2FTMnTBQ7umSvdpKmF/OHx2qj9UaDCsuZbvI/EhPtTlDQI/

dv5O0UPJyyg8K2Pz0ygBaPcMwrXPXR44rcmasHd6kwRZc5ObrKqT0jNnVYxJAhtH9q9gQjelOAiXxL0A

nASjllPYR8sJ2m1ruD/OhCFXxy/rvGeT0ye0ALQ+YS
MWdqPycIiWmq2EVFm3RhBqB7eVfd6Qmkw1ayQpajv193zA9Sin4Ch6gP5Kr2S6qWfC2W9qXfij46R1Uu

FVWLKBYn+/dJFTKxunOOoxgp0AuAnrDOmI3nS2ywC5Zzba4fGcnmg9BteKnGDXdL7Og3dnlG8xMTeWJT

gCjKIA4B4q9M8x9Ez/8RM8wmZSMTJ4R6nV+SZjaO62
bU1zOeyLXizOoTZKqCRNryUtvr9VXUsNRejFJJEPnCa50aVxNjNoPl72bAXAEj1VFy3tv0O66OdCKkM+

gHiyVydF8lni4irkRzmgmRo+UaTjQYs6B93y4b6PByYWt0bU0ts8d2vuskmdkkeBgwiSuKZ/N1xb2UqM

PbuvaoxKWsNoMnY0qQdDUCGgRqI93A2SnZESXaajx6
lkME4to0N5D+YY1Vb3r9Vh7YBnj9M6HPvSjrjDoYS0d5Wcks2dzLpUs/15x5vmIyAQUi2Jc6PTZLlH7U

zG0LU/CBWcYTxBOXYJJwbn7XoU5EWYvf1CUMmZCOBt1OXppwK2UeWWs/7B11QcpQ6s1IzK06wbP3RH+l

lccEHIxOJNGVHr/asXZBIvGYVNHQ9hsJrw1XwYg6ko
rsdoZDqNfVbkYgZm4ZluP5JFbuYmcMwbX+Tn6Q9Hu/9SNHJ/iqi6mJwlUXLid/TiDTCLPXY9+TjF/skU

N2lXeo6i+TNnJ+9ZHjqT96+pfNS5k/0dMe45AeAtybMvUeV6EY5NOsV5BWyC4rALxFzZBo7BgbZLgQT0

O0AgVdoMMY7oueVEjHo1Qqp2hvMlk9g9FWXMom11In
Raky4bVVLhGydNXxLdspO3RsNxf7rkkIxn2cg70bQm7V2mqSryH4DZ84bTkTl4ON5J4u36nOWWWz/kQg

MdrhWYnbULm3v6BsxfEtwjEKiSNCCzjNQjWZTlPL4HhKTJZQkPl8mzivUZyuBcV/LeOQSOpWYTb4l5gl

Bfe6LpGUm2KtryQRdFBKSgh/Ozq1UuSC7SVwz+dkFY
YQi1G67MogidFV9lzyGqSeUL35oW4qhRvCGTlER8dRQQZ26WsTFML6UorZUvwMtzAl4wACksYCv2GUxU

N9TSZmeBizushorKCBya5RrTHnhUqWytHPW+2nE2qGbopD5xB9ei8BjxgMAH95tiLQk+o4Z1iiO6yhxl

6hmTkfQg+FRyQ98CmAsiGoznz0z9M0Jss9JgRvdMGG
zRf9SUzJCUt0ONa0UrnEe/ceRcmDlcF7x4Cy80eW7bwK7E4Plfgm7/HZ0eumlh/RVFVEf+tOINg1vfqs

HMM1yZlOeMVzB8f4wbhNdh0TLLkadNW3c/Vjh2EJGd++uTeP+u/XWKoU/vWu6BkfXUyTjeVug3X45J4O

nQMFE32fn9zGCQeVcjQ7G5pLyZcm47oaExxA9ixeiv
2AnlSEIOpFmD8QoyN5am+X+oLR+QJJdcPrc5F3Cs/9b1vsXZ3TZLxXSaPw++n3dIjj5BDhMiWfzxBUjt

bOQkwRxJbPUKORQC88msxHdspfpAO3EqKXxre5NR7Ea0Pk54IZeBvxEnwc66Mwbv/DCX0dV8iRgwZp4S

Fy1Ec+JyYQzGexuSQCUY2M+koqxe31++q+D2F9mml9
YdvoXVLok6ABb/tUSFeyqUMknoJH5VRCgFYbLo3+RLyHxJdNGtxkrWpsJz5kmuKBJ1Fh+H6ZYQ0eUEpb

vgGj8o6rbzyr890tN6RrABmgIn3NxQYHtSVZX9v37nMXVnEz0i9r/FmDjvHvyf/hG6v4Lu8c1G5M9elP

w07lNC678Y5MdSyq7f/4b9G/Zv2L9h/4b9D7D/c7l5
eAYbOJXUdmyNNfcpk3g+7N4K9HDCSjqNbos49SVx7RdgSiumrrYqIRi0YxvlO/g+YaNJP+hhqLQZ8axH

m/OlUgxYGHjfVIuRwImxxP4ozR+M5mdqkuQVXUcPC39Y8ziDqz6twX+FUc5qA7bwBYol9rcKUY5tU96t

Jz/3+9nDsCUzparDx8fjoWA8b55MkiY8Rl6l7Yhlzr
htO9V526xJYuI8D7BWOwTRM2npujFAs6g6rt4qZf6T4aBSGyF+wdvsglMU7y8+MOlaL5gDY/HysMlja4

gAbJupZd4tDM2L5/Zex3itEjtaG/pF+3VPU37pURAnMLi1y12S6QwqhZOOnvZfs2xz/RNyFltT/jj+Lv

aJMm3iPKBRWpKuohwVOd9QUZNqiBo+oagOk62AfTOQ
CgXbYamSfhznqPo3Udolwcv54Om5i1SgYWd5jU6+cjDpn6MhhDJjrrIebAKtgZlV1ME6z6kUGJ/42zqu

h11v1tW+m4gUwdWwkCtJqZ152RkUUPJ+mp5hb1fyPPDClCOudIFbT8qjKszzN3cCtAkfRlJLypHOWuIn

b5A+2BcAGPI7nuOgriDmC8zqgAd/nO2qsKIfA7vogi
1jOGUH+PJ61EsdDnzMS2lg7vANjLgJEfaqt4ScZc1RIkguoAN8wc0jmFuE3NWJ2rUl1Xucq4b7CPBBlf

hf2BGBT3yUP++OSwVEMnHxXw9DjOTs0os4X60VgnNjYaNURfLMoQzDkuNsZcacv621B8LLiwROwwZhmd

1GqkbcwRHPl9VFcyvYLjDRZ+nPt5aFIa7DXvkvzB4s
W4c447vyMKZ40Fc2uJC4aLDUa1NbLUZE46Tpz+DRpRa7TYaj1kq0SWRG1mMal4+3G4v4TwiGMX4h0a//

aqzH8RjZHIMvcj/MQpmdD/M6GhnX6/mGNKwAzt09ztWzmpnTKsw9+fUX0WyMkLAOtmlLblk9/Fxf1HYz

wFmzsQSeJxbh0XTO/HSv3xj2kwqhKy/NnIhSlTINgR
oNcYmGpBPs1dVX6QFcTvWr27CG3VlhpcIeP61YRNgy6Kp7G38VXeUk4XfaAOZRY07oK8lWG/Z16o1VVM

zIJdr82P9KpujK6gwMjd+1dqh5ybD/+OZzPiT+DIvbN9s/ZD1V0xHWN7dWF9xC2DBf9zRfGiefT/cFyt

BCJKtIx0sPyqquJcU8Q1rIs12cjUyyRzhsmsQrzEsO
jIIS6mMW8vNKAPypZKK91YZftFqhk10SrpGQCH1/dv9NWFQi8fNQUSPdhb6gQKLMkY5jkD4RkFtIpv9l

IRHdQs7a82IG8PIkOnOFR0sF8nt4/JLTcuYDueJN9wgdPb72xqosE70no1MvA+fI1b04JZD1mL0wAZYA

EzvfejQ+qTtcANv+w05kwp/hDPNU1DfGhKqG8ucpIk
rDZWKqzYAxQgA8Tq5QDIdFPNRpC9UdpisdylXQU824q3PGfi6SU+S2UXtiuLp8aaN/jy3l9ylogpa1ik

xXlrPJJIuL/Gwyrry+5SI1ucbNMzntELpG02+QkRJuG+HRxckkHklGKYodsgHK0goFC6bhUx/8dQzeIg

Oso4+nC6AJIfR60CXv9IUcRc4ig4hVa3gGtjG3WtDR
kNGIP5C52pWGCkAhU7fpLWfPxfi5AscraujpBkXLZrNFHPLmLaegUSbFqnskCAdCqJOVtPNb9G9h0P0G

zpluF42TS0qikqpkUPGamSOLPclXSM8L4F2s0OL2Y6Hf+xqNAe4KxF4ku6MqINQAcheWtPuAJwcOVjf8

VE6hz+gRauc6kHK70QzyG1QRutD9t00HP/MyI471fX
rDEe+hVJqWKCQ8N4Uzqyvnd6mgx/zNN+NYOSKCfLLSecManHrYzzLFQq/hw5ZolKj9fvH4vTGyq63QG2

USBcggeuLPVDTAO+2C4+ctlF00hlCVEL23p+LtHskpu7oRztxCa4kTPBvUbe5gaLffjXNZbIrcfZGCCY

hf4sYUH8AwANQKc7oHsjF6Ys9b0xv8bDRkf58i+6FG
1NwB5wW5AWhx/ZOgnihjqIg85SBfK6Df5oJbWiH5njnmokO3ZdwaZkVn8RppLXjT9NXJC9dFplFjZ8pf

bvn7GqBCDoG+QSArEutl07gXmyeD+9TBiz6D7xr6h1A94pb3TkX6yE6fcNIw/m0C01Esknq7LmbyPrB2

mbK21ILVOVsrHMwgmmntuCngwlpyxsrpGhgEuha0pL
S44x2ftUXwee78Nb1LM7yzVoShA5QXRZM0d6M+r74EMmtWw8b4taPrwdnl3ZNuOSusI+dEFGrSlFltHN

R6G1QUGwssdoVovYFVnNJExlQTJetTj8YqTxcZERf5uXMCT7DOQbZAku/5JVonesjBJ6A8FGotpbwPJ6

Mnw9v2R/NpUPc5PNsMEVia6omIb5zMGGVJ+A5SQp9m
9FciEisO7wCi5MID7AhLvFYVtv4/cekjSQMNBjTf1JJgoT+TmvbpQUocxDrCW2Bd5BCy6ntMTKZMEAyK

DzZwAf47SoRCYGUjAWiEBe5OT0Nx/Q3iefXt7Lq3HOuhKzi91rg3tpgXQ1SOduMVmvAccD5gjAHgOjxv

TyESSgyTkUNgIaS3EUgE953wDeIXo9HXvTuI8EtjPo
n+k1LSClIUML+lpu/K00HGR64LqwQb423mmw2f2GBa4IF1oZfFbzYAZ8nL7Rqtg8Ie9eEwWcsQuD0DM7

YRgPvfmOX9S1ty/WsPo4xPiQMddpQHpOTdNpPbHVnvILFLd7kRkyKsEvApkpcz+0lRBxKQr2kjjYP/wH

dLtnqaDEaDCwZK2YCZ7H2L19nnsoJAI4y3RRs9hxZX
72zvNvU0FxXubn3IQe5zzXlK0JRdD1vOPh/PrA4X8DhuFLzIhMQ0XsDzXaBusz9cbc86DrNpMpGj96Bv

xsnUFMEdoOHgPu7R4NglRPTxP29Fz0FRCEQ7zM9oGxWWnG7NN29izx8jQW2SiDLh32XC+PZFw8qmGzgk

VMnlRvHCXGctBi0hJk05MHW7HGRtG1ksgeZ6b8UGbB
7a+2m4biEll8K30Go77hqeW92KseJpnqOqMqDVabHgbh/UeBLWcKmllQsLQnlDY+Uj0QoHPlDsyjW99U

1jrfP227qw1iMVA6g5i07e4v4EEGMC1qxeX0xYu31VATxbmt8kbrutYbHBlEvnrAPHkAny4zqTNKIkmh

5heMiS61bS/CHGseyyIsOUzT6lX+99zDRz8v7h89rj
BcOTTCizw6P61/u1E08HVPI2hcsjFt/JtGFurh1q1a5u1hVrwU4kiTOB7/OVz5kG8RzAilyYxmtABSkj

XA5ljqOObMI6yxQjQcxwiRV6jqMLkfyjZQ01USGT8JHt8t1ClcmK5ORLIszpi7cXBNWKUM3QT3+RMoJf

OPl7JVwq871lLvdGqJzUMLZ4OMsfah8NPKafvpIoNU
Dmma2ahubykKKOarjejvr497Y41ndLYSTQf+bF3lWLY+lRk5i1YSXveG5392oobJ7QRuYBOP4hahvXP9

Cz0rSslvSi6zYMl8UtyqhXOaQ84tA6iqwGAsRsPn3uMrPVcZpaKqve+iApWPayHJ6FLpTBKO684Nf0vH

ylBuqqYxplwPZUjRAzWs74gkaXm2thip+N5sLlmKrD
eOp7RV5JVicZ4xl8EA+5kM1gw+4hb//zFLKZJY8fVSQ8HRZkVXEavPrRQp+vo2p99nWoTE/hjzffbq+y

aVWKJeT0dV7itGBwZw9wKXVe6knNnyTkS3AN01lW6SXMBYIhL6iuY55NEnY/LV2M9MAh+GIoO0WVGGoe

q+CgE9cYYuLP7GuDMYrj5IFRVM9pyy7/Gl4/1LrMI7
ipc1m2yHzeiG9lgRyzPWZJ+zpGcuqnB8iFAgcl4BZHJt8zab0A00SR+LtzfL5QFq4VpQ43bV7gq6t114

T/b+SI2qtOb+joaxtRh6i4w+cdkM8p4v8sFlBGHG1mC9nMnTVG940Xw9Ls0NgDd77oZ+0Pxtc6U/Nirf

LjAAAm6aONmdeyqLtRwtSYRNLcN8/adCn1U9scDgqy
3J8YFmrURTKBoWs67VLYA5jJ7lyRZ3YC0f6ZNCFQIhIsmtwOwJ+VeT//L3voa045/aIGzcPaNPc8lrkC

5Gfk7q8AtbBJYU1hKm+ghKO2L6DL2HU3XkMnpNS1Bp7oW+BfNcPlrLy4ofZJlzDHtIETyv/uptXCb9Fe

4CA2P52oZx7unyLftq1XSHTkugV+rAcILrpq0+I9VN
2buEXhvMTqHojXAHsnk6pIWmkfnHZ2YrqTN3+MYB/SryMUZfxDdeUUOHUg14mRfRl+5lq4I+8vWQ+YTF

B34cF1H9QApW9XWQ28Q3IuzKEvbQpfSEI+VRFRpt3IQytrThAv3kx+sYXZ8My/QkzYRLXZR2e48IcFyK

pq1YtRpy+ttL/gEC8e6FTNevHWsw/l3xkf/KIFYlAj
D0K0r3mkzEgBtrKWsxmCVgwjrcSB6HK8PI5GnWxgjvT09beSuBBcH7yFn5KWXSqwD7hlhszRRMPBYcHL

y33YyTW2luVYD1RJyXpfIjaM5OplAZipk6rtw3Op095eQ2p8Wq+TT1zUIm3Gblq1iZPVi8zHkHFa99jA

DM3bO3BLQ3c6xUDYbrSmDKYxV9XNpJf5R8YjMxJhw+
r8eBS63xL9rMgAli57n2BV1fOP11xzy9HJ/gJ6MiEllxVsiU2iAZF1zZntYsVOoRan9YST34u00sBxnm

Z+Y3nBHFQyo4aEl6Wxi6daKqzvHmEU60Wtd/0d35gXRaXBeCHGSZJXRVq5g094jbRReVggValuNhSCUP

rmH6j+1Gp514gDwynljo7C+ViLGEjkoMLcyj5xHMaE
pNL+KPIPL0jyi57xl5jt/clnqJmb1nAssPaBRjKGbqvi71XEq3OqCX4M9RonPz67gZM3PQcl2AM8HwY9

Ki9BUT1beZnPQSGouy31jIe/lJnJYJTjRZD0UOjRP8mva8Hw7H+DDnOWDRTUAnhKJmwTGZFfJpR3jrI0

Cq9YfrWrwmE8TziLs6q1gzuBWTPEt1mcNI8BiHk9od
vtsjAuvMmtme5cUZAzcie0qnOC9n1H3mo4MBf9Bh+TvNZrtJCgDhnKnYU8LtCETLG7TWUOqFnEx2yGXG

AsDMavvKZT7ig2gkbazb+Y4B7ofjh3hDty329J0bfm19v7rSMh24c7E8Qt+aipWaH2MRI2RbAB1pW0Rb

C/SuT+fCzzeP0lQNi1LQlqgUwu3iuAwqlxZa6OPHeL
0uDRZP2y7o802a1YPTZloGs3OD4jfRxXOm9CKwX58wMBzhJUJbdQOk3PVCV1Z4J8ibEeS+m163dbpz3K

egdqTP4Or7ns6X4S1AD2IrFwfEIPZdTanhUR8thgRMUDkSHOw9yVkTyAoAHpRc+mriGVHjg7QpsC9lFn

WYy9XdvQ2B5U1/VHDRIwzClIB0EONwda1+diL7iHWG
Siwm5wsry3f/TLK4lR0eA6B1SIAgQhGIYRTGUVfdz98cFmC/6+GNPS+XqtV5HiYbhYT/cPAOdVPITbnV

oByC+f0TdYQMCZOMB/2laKmyXCPl6GQxjt2NRMgWRd+mfmYh3f8wFV0aGNBNfd/Zm3+RSrhCLDmKJ5Ej

L+Rc8KMCnqRpSuJx3a8mKOkuiGQJweiL1jpsxF1wKO
d4safmVMvdK0Z+qQJ86j/Cdc6t/i+/pN94Go5DpjDIzBbaUvJrAHrGxwzokDCKSePPeshnV/UVD2hKYx

R7Sld30KVwHun3KLfwINJLBx27lTB1s1BcOY7mE4/BS/ePdN/hNvHunZAqeg8s3aRKmUYKMw8gMzOnrS

zounP93McTrQYkccvgcLIAFc/wxWwur6FlV65ms8M9
2CDdXEHiOpfqLzIWmQf8zLFMOseuqAWCzdOn40ube9XTxi+V4BZraJGVN0lMFa6Z71AicQq/OzdIn0Sw

R7R//GoE3DhHL/DWaYNcNUD+WVwIogvWMHHw9zW1o/RLRBW9DqQsvMl/nGYJZ6xBUjjzcdCBZ3di8uhX

3iMkoxj79lYPK8XI0S5SdaoQgnbiF+7P5UJx1vcuoq
HF3mB4PHL86IehrGHvP9tCvoaw/d3u4hdUTGqDAbhmdshj2r7SHfyItUwXOWSJLbR4Y53lARcF3fb75A

xCdvU/qgyRVbCMdpNN5mO72FwN10svOlHacaymBOA3ODZen3bXUPm4xSR9IIegRLP5xIlSzv1tUrJQ+c

ydaUJdjaBODNgu2rvsI0XfiWwkctJgdJBGptsRb5zV
a74zl19yLb3l8+dkcGEmSNnlYBOVDen7iK1Ower+23FkhT8V00HV3KK7gn4G2rwXc9RHvOJ3FOUFlJJR

iqKTbmoqywGH3qZvsR2QEeBDNz7dNqE/tf7Rubb/EOk/pr9vEwBdX4ygiDw8sN+EyhKv00aFftBcC8Bp

P/aouDq0m33ZFsess4ZN9TQrK1wCpJnZP3Kvp5ZeoW
S+kWCgYs2rbhc+lOHGNgY1SWDGlwUtODzxdcR2fouVIr4NNf5b/uikcW821ylm2j/AAAwJoYQNksY/5B

G5wuJr5FFRZJglTAFB99HemJTxUY6Lh23dCL+SXMjrgnqe6YBPHCt+4dAeAvs9hJL3H1lUPdYDRKiI0G

1dGG/K1IZUP3zF6pLb5jeYsvz8QE/LGfDiMXRLXGm/
B2bYIjs3eSgCL3jaK9C7dvOmZwLKrHfF7++iNstMScSXiJE92wGO74UC68X5CjiMhq7AlnXbpCWBuLDg

yEu9lAp1Twqf5jLhaUb8/+FJq3IOcpY6amcnGmim3vEuOK7OihK9dLLL13wSpQRg5d0c7nrViLHfhS5T

A9FrqGCRQxd/DAhSfxmvFLHvUonf1WILTzK2WHUzVR
jyQTKg1onvs39ELokAzPcA4a2dvN7+e6RbSBSq7Z8bw5SnMGt7sxt4Fj/Mr3yuxgxtj6lDulp0CRc960

wb7xzX3cfqtqDle18QKYF4ZotfBkabrV6+poKVCm8hDtOSHxcfKnozJi7yunWxKiyzQYg6WKHszAEQlw

7zGX2iDWWxx/t1nwNs/6XEZRB3jVRYkN9MBXUn2ljZ
QC5oO1LkQIl0tE4wupwnlNSDxKCvo+Ecf/oZYpRZj5+i1v99x3uCEIaBj5gmZvZ8pat3QF8ohrjKP879

83x9kM9uScbtVicczHyhipXxuNDxWVFYH7f+zKcfnm0pgaH5NTVksCrTlGsDcNzhYa0GvPY+DMjSkJ5i

xIF8ENU8K7zHgY25SOAbZ7csBMG3XanEKawTXlhxF3
0ggDCRp6ts+46XSJxfYgIt4ye8GZm5zlf5ynT8d8q4gv3AJd09xHKoxUW/vDa620TmSvCHg8gyH3rRqa

Tf9vcVftJxXoQU2BFP+2WA0VizCb+BRIPMUa4/tbIJEHRj58idij6KgpXP0kFWgrWFpjZjqaBRBi+h1M

i4zuKzKhFguQd4MBR5kSd03VOP8wCXq79hs3Sn9xt5
3MiMdYwz0JZ5xXRGVuuDwdIKapxxcyTxIOF/RLw9tSt5M6Sx+gYAVP7HVqOXnu/XjuxHl3kArqiXxXtk

kqlxI0Y5Jp16xN2tKgWJRp++8UpUrEo3WwgAShMUJBsu+J97xt1hlE39a+jbnmN2M8tatNhqTaRE/1Xa

vN4jM4fY01oXpvLH+A2Lgp7GGyj/dvLL9JfrHbjA8g
2+Kr6tJXGKPMtKgI618ExFp8N93ZrvH6Moc2JGiJ/GE0WHlINu22MRDuUKEzvbd/0oBijJWUpBtYsHjP

aEm73aUW2K1kV0v75JFlfPnLdGkRewNJRpn4P4s1RWhjxdGcTyIic/zu9jJZQ/PLj/Nm+vO+PhUq5GLP

yOxUswy7hPz/zvK4h1QOfrkEfpgf+/SZ+cFHqb9LUj
mcDzT9uBjJeO6upUIg8Nw6cqfFWHV9s+Y/IfHjH8seuaNWeKQufn9v2nBvnTanC698ileE/UgG9zAj7b

gMw4j/LmelT3WRo8+8jyYP2SZYiLp/R9YO+Ur+h92XrzYy3PCJ03qCqFCFDWi0lY85vfRSEV0tG6nqMt

B4xnWiJjiOQsWKPUjLR9ETjNUqWkbqDY3vq4eteCHx
+XBiB4M6DGA8o90j2BQWQt/9Ea45e+XhmWzcz4tB3taVI56xOb9uD4Z0UHnD0oNN3EsFcVsngYD7DxGu

MnZcsY0RvnMYZKjKggaG6VNHSl5lNOvI6KmDga6+bIKZ9UfU/Uubz8J5jcim4QeOHUOcJ/puTNCfWNH4

Uewp00AUGdkmNomj2h54mj5+6RUmN7GM5hJVNYhAYJ
4eB3P8WHpM6VPzc76+9FKdap4PLHtSdq9P7R5mKZ3ciz8O6Oc7+bHZkY2eZa6FcNfdHcFi9WUapEGVLO

1jQaQXP1Y0qu1e9fvKihKlV913VwPexo2d3nd3dpyp2k/xwuW1GPI0ds9FSLv81ZKpCExJqELVD4QtyB

igjvSl41t2aA855maL4k4uhEttN5NNseU4huLUcWCV
yMW++vdwUY8CHpE3PAdjo0ZhqH5VswiS6VrI2KC+4TowDjB7a9SxmK53MGdMYAkraT0kFl39TSraNtAI

DIXi/VrGyDt66nbztwNY0vum96PCcp3JZmFQPvGVe/fjAzC8u/0PObVkBAZnIKCJtrc6x4XcL4iPr/c6

r3h8hyBmwSM/AzwpAT6WIPAy2haN8Wk/z4jB4UALcI
vclJ52vSvkQQu8zFtonixRMMGOAvNw9Ghd1zBwscGo6bjszvvDmHJKWeEAxePel5NjqL4o589W30O3us

GXvhDUn0tRDxlF2/qalE5McGQXD+BFxHIP8oGm/of9D8P6b8e8OhDVQpscsLLz8spGxoDH3Jq/WcJfNC

6toTfUFOwo45lb33h5W8BrkdblLh+Sami+GlVfaHKRv
/JefVBPZkCVa5xiqx71gUG+FJK3mBZUc/BAfIf2FKgR3Mitk04HCc43e+MBaxKFY7fiadmvWsmWndcLW

srfaHPii931xOigSYvEbwc7/sKw8XPlLH3br8d6FkOp+7GYlJDtsGsz2mvDIxysQc4vSEOOIPI0L0uA/

0W7McQF3Ld7611aSuVK5q64j3dTlCa+HQWU9VOcE9y
j9lYiaeIImtljkukSenQtJcpM9XDqjP7Gw7vy9//SiE3QO3hJyxPg7+HlV+yQDTAJY8jgShlMJ/Jv3Kv

Qxz7q38u0OLiiT80XmAC6d2m+F2FwdhyUpqe2m3Mr8imBRlg4XGIbum3yOm8YW/wtS5/xoyBORfCLnrD

oqfjNzyCi4IJ4LtKkmhNg1XJCALyhLBLt+2SCRX/X+
I5BKGV7e0xaHcX+8akMeWiYePPUq0pzt5fhFKbQo+JxThx8KU58dK7GCieOgPdNsY7hp1bZkct643tss

vUlQcElBgKE8zEPgJZoBvg0gsAc7LNJoT5KMF2yRPZUeHUCgWHQ7m1GA9anpllQLyeEX/Q4hzSTBEhTS

H4yq62fhjSLO15iC7XeAEfUI9UwceCfCpljhogl4jJ
SeboT71KpKuVxbRirktNCqn93+ZsNZ3UrfP1I1rs62EEoRnjEscjrlsfws+bBE0Y+Mx/Yd9kYtj9c8xl

5Gq54o2687UQulBcRZF3/kRzsiCEHze0qEn8BKMH7oQRsClwJw2wey+Qp0kvdSX67+BJCqVzHLoHy+Ip

nwcI8f36t66CdLL4cbCKcA8OAehBLLLF0qf//Dq240
SGna6a2wxYhb6kg+ZZYxaAMoyg551QU/MftIU0oWKyzqNrQC0IYQph8rbApTG5ndjgMfBGGWF6yhuTMu

uo4Jacfa5hzsTVYZ0pqHlzV6wUYWoHqVzpuA4hwiSkbZIbKHiCt3Ez+Ggr3yQSuRZnMEb+zJHn/RJGoW

vcUar/k4DuozWN2iVclJYAcxBYoZ36vbmAUTqTJOfr
ovi7IhjoJe3xr5ZSgBFDImcKTI8BHVNnPCFhV2yFGfs0EivElZteXC6jcKEkZVoUe2NM1wN3m76+fvZ7

2zvKjwIR/sgHpOPyMYLvCXOwmNKH2vqrXzr3QS/YP/K6gBEn811qXS5H19C18BS4Yymcf6kYo6JRen0C

ZQ6x+3T2G+WTed3eX6hOS2m/Xj7GsUDv7z3XCM1n8P
Cq5Cqp+z2Nu48outKIvRLhXPvarifblJQM3nQn4FvkthzDJbfJMzz0UnEW0rsU7Tv9XR39fm+3/sOqqd

kIe1Tz7R7PR+brlzny3/964lbZAFZPk153+onf7am8yGp3T36Pwv5boXKdfFoSlJgk33/rgl7S88ZsVJ

C4Q9VdZ7wO5tqE/chY2wDhYtrB8HpCfwsHuEPTdfDj
Qyf/y2NnATLiaXfaz9BLDl2o8CaD10vE6XNfPmrBEwt2W6mlLJTSs2csbOT397C1WL6DwdWlXIxUL7jL

KzeTZboNFy0JhBpwr3qiobIYzFzqlsfSa9/nKvPeu3OhHLpwxxXnx2Pm1NiNYxcNJfHnruocdJi7VAVx

c51gKnoGemKH9ePRNFg6iH2s48OX/7DjmsYfjeQDFQ
hwIzBYGy+AqneHc/M9YoGdilRLTKoIkKk+EDirAjw8QnK6q1XUqQT11QEP8IRKVtuxjrWvTpb5nDbS4D

4VbqU0dVO+t7JbjtVYeRh3WkMur6urCcsM4Zii92l9BGFrmtCNcfUYVzv/yA/oL6HI9Nj7P0ptYGrmK1

IjOWd1SdHJsjiusphGb12WeFk+dVKwG5OHIbq3b4P4
iooyI57qs+mWY18aEkt+qmc6jZL8N4vLByT15mA+6sH2lbECWrPlV600w53e6US6xno6A9okOWflAgtU

4dkQ02xKjfuUVlTv3CTTus++WQgyAM7d+oWR1SdEMKe3w/9zwYSZ3fgexbpRBV6AC54ZimPT2rpSE63t

7cQ40sTVcLsK1fOQptfm12R7+ZN/e5vTrHo1dNvfdt
W8xxmGCKaaHQxXDsgqIYM8R3Wik6+e3yTj/55O3prDI3AhcfUzzRAW4dHgvrLklsGaqxdfwVFZkzzr03

T65cKVyjUsaLGKMiMo4jOKKyc+lMko97MFkIf6JG9wyRqdla4k2OETavzyRsbFgr2zH0qT3wcrpBL2CK

o9icpZhC/Io2kVGsjyJATqz9tv8W3TbfnuZYvsZCrF
vU25xWDCYkKofFfOVuiv/4lR/K5OkK89PjeGCD1lH7nRQUII6kLuLXQyWlsbd9Tmq+2/RwrQ0RY0IFzT

G/huggBRPpkIdtCn6hK8VB+m6kqLzAXXwaoVhU0JxjKGyGpm4xiBMQ2EUqSgagNq4rtfLyRLipyIR7hP

44nvsjT3P2ucouqbTn2mglLWceRlB0Q3UUBM3ptcY7
zSIMoqeQTW2Zs097M20ZMm1pCTJ3MCPbCLjQkfm/dol5Zf8lh8ru9ZtnjP0IcVv8AXo6oWoRpoyD8a+b

6q34RhpBIth/+tYYZBl9Cht38YNhMP9pUtKXP1t9FHAxbpM8my7f+5Kahnd3H5YuotZEMNH9FaGno6lb

urDa0dgaU0DNq89N1xg20dCvDjwRwwEWw3Sui0J1nk
2nueWwjyH9DNX15SRQ28Nw3j441WWf/HKpvP5kOUjDLPQyCL6xo9J2ZT6gUZrvmnW3IQ4QCJ9Tjw+D5o

ZC0UjCHkQhFvCRruUM3Z+1TL5PTxpiooIoLEJ5+KOk6jQe35T1KsAI2RTyffWAHBIKRLKQDOyP5gpwaw

m38UwtPEU/hLCwCF6MIlmoTg7Jh5cNmBOw/ZitNVpc
0GnnKTN0ucDUBEscoWkR//PTZzuis6TNlnyhdvBmpy1YsC1NepLxkoBxlgQkVTvdDwZ4TzRoY81Nrehf

o6w1lekJ6v1c552tupsvg3fkHqv9DJmwrPB43KV334GAljHP+/n960aaJe80bA0YSwh+HSIIOCLLAhHi

qieW4RAqf7W++rT5WkkulH9MBbcv3EJ6/kEccy8EWo
aoVLEvVZKt+2SFb4IC+YyoCg+a5p1T35iUJSHsb+tbWaC3lqvlI7bMiRhwOFeKhOTBj4tu0IBBcyW0z4

qSyYZXBSKLCG2qYj8PB/z1wZ8QzXctRStgjMF2MoA5qKrEMjm39FH6km/36g+Kgb5rZqT1zE7KY9alr2

fE3tALaOEhNtINwIMGE+3TU/DVZwzmI18QZshxsPVy
NKxYdGduPbB1DUNiNpRkdARlTRdz5edNkXM1/nJwB+wPulpWblR4gsp5mYtoGSsxolL7RfwkLIZP5jF9

SXGVpCfqi8hPsOnYE4SIpO9Pkbm77YouE4qYQl9w+z6tJsvGf/7y9aKGxAemAfYCJtcro37gJxqwZYu0

EuyhDj89XJPx75JKh8zK5qDbOrTrS4PEzoVF96dsya
RO4axwTbNQjMb5AXBlhhdKGADh6QfPL/eRwtc5zeYGqfITj3QDTojf7zGaaf5PcNxkY/veb3fz9oSA8h

xixNY5olxs1fjH9X6Grx1lvpiKASw+LU0goc0fflCdMlEWhcdb2Jby/TZriFmGQpDmytkFdX1etx0A1l

fGLk8SjgtJTfIvP1d/bDL/wpdBYY9OyAlMwzXi/yFi
dFQJ+aL6fSh8SDuRCfYBvy2TwMEC4ImQtzoPJNEWAM8RF0QBC04cUpMJ9nekGaRsVqBfWMkxuk0+IFtT

zOmPrh+r75KCNkahqWKFG8yO3TnYtsKbzHTiFKevXkS5zWrdH5Krypier6bEwwdYf+dTP8Cev4kbbxSQ

1B5bjFgCssPfr1yt32MuIjLpfrA72Sr8eT7YgIzyUi
VxydxRbLUL8vfJ5mJ0t820GPQJgGj0nPsMICKAtB5AMastqgG5TA48WaUQ2DTfBcDwWasBwr0hi4oZlc

cXvRXEEdtWSiuij42r8YFfxYgq5s+5xSTa0M4s25yymR0SW2VRJnAdMnXraBMrINKRVcq6ox9R1d4J9v

iwIvAo96P5bUdqpa8gYU/Af6iiJs+o6D0q4kG7c9lh
WqSar4grcs1ClMyQaWtqoTxatpV0a2I+Nyc5IZQz9f9NmhhGa1OWkMG39rcB03teYM0FO1o5zIrIcdwu

v3SxKbmHJvFUeCji27sh70WDvTx9JnVdHl8W0tlUFLNGlW+ppfqgqo0sl1oGdLtR2fiBRsyaVk2pMNvO

dplH1gN2pKheLybtrFA06QafEbOiS0TXTMNTdM237d
uuQqiPXaLczlGdHlzi3dhObDCeFyDCnl6wXop80pRLrIQZUAQVdyACzbFTzIgj8wf1k5+J//rXIBzSkP

qrFjQlmytKjK8kTX7CzC+HsGUfWansYOM49Wjf0FhEL3ZBAaMUlQUM63pH0ga7lei8Xq1dnA0lOlIkeX

xDZU4gJKbgtGT5wMDTtX1e2AAjDs1I9P2El+WwUmKX
Z/A9ruEAMIML2BCe2Dp61wR9dxORF0a9vPDTYjZmY38sbZJrxCW5saZZYRP5/597eDw+mdn0Xk2N5nxT

oY+BGQZL94VN+gzRrI0AiNf1JBKX+Bxbjlqvb+/IfoI3+7VBePN43ozX14WZoTDMDnVRHWEYPy+31zP8

hmkj9ae+87KZsMoy7fwotG4/eYj0EWzxDS0CxNaqUu
29JLDeL2ZsTK660NAwK18LOXb1I+4QfMA4U5UAN8UAnZR7VTTcO8etT4QuiRCZRn7PTaRZPrdUufbpcI

uW+5d2egNFsHgnik58Nyjx6logtZXAxMvMM0Bh+GI/D5fX46+YognVNnFsZCEGiDydcKze5pxKnKKNC5

Z35PlPmNN0hEiG2rNw7QvV8+RBWZ1wekblLymfFsMY
d0HFbbBG/9ZBFoH5z9lWgcl5vJKNkr1GCHhE8uUINvHjqI9xGf36/+01/eVITdBO3Ty8de74J8TNFwLG

f2uMhkEyiSq4N3W9gAGF0rOjhAfze03SVy0rTyx2BHKjnKPzFflMk5kX1FER9/vQe+3bsmXF1U4HD0WI

Ii0Ltm7CyY2XUlqW+uCj1lz0J/q8SWMDMPzoJNrRnR
YXLluxYke/p/YiLIAEDZUTlRwuIfpOve20ByGf+K6kUr6LgfcOFSb9zIl53FFM7WnIdMJNr75Z0j7c1f

f9ZSQvbnzDVlAkKOU29EJkbOesxUQN3K9Gz4pJrpsOaHhRogj86sVB5hwG24QfLaDgNjG45njB0eBpm/

f87M+mNzGijx5aN3Ou9eZJCdsUBPyEKkY17Zzd/qbR
iS0Oj+fOy2ZB1ytAUdvBCo6z/pWgbCG0xzE15jL833VQtoAv4/wxe6/9SrUcvgs4FhBvs+VhPuJ4b47C

PpYi6RYih8AeTNL2WJUqxewekaXI1mQn/m11/Heih6dxPXuQTBp3OJaX2MIYk/1ESRhkQDv5SFh/yVHq

qssLKcgL0UUkZNp6uXlCthBTaV5kWBaJIwX+SllJ50
l6ErrOwbhdAan1K0C+tzalwvvm1mXiBcKjq3uutktPUZZ2oY/OLbzb9GDffWkwZkEi2ebuECbx+YuaUm

3/j9xmaBBLxc6vt6ojTrIjIwdT+f5ODIElWysQc+wRtBR6dAugDy+RN1Yw3Dozmye23b1ZXqvyMU5jgK

2wtFR2uNgKoeMe5GxU1xeeWdx3gz22zv8TkYZmVB4d
4z8+5PlxdK52bQ/5g/Hiz3ZH+2QvCH0hMFujGGDSlz7P8CGff60iPs942ZyQ/oQ5l6/tR9CKw3XWBryo

RwIZl5WnY1FaxcKBqY0XxCrJUs326WuJg/cMXaTpkDydTqXgfEsYyNW+HkHtvuXCwDFE4JzK4mL2C/1y

z+CPJpOydQQdVHaVkFKGvNki4fj3/m1EkTTJ3gL7/S
E0nuGVXd8hYFEzq5xKsZbtCfQgIqQ1S6FelntOIOmiiKuJAXRdRMi+mrL77e6hz5AUTmNMxzRGyreKqM

Pnwo2akTalNxW0PGLG9DoPB6k/PjLu5EcR4e2cZXoei82A9kqFiwBfAijZm8onuPabAT6+q/cAcNVuVK

qQP4rs/Ac39C9R/7qcghLA0MqEaqXPP64r+JWwiTaJ
I4yIeBOythzO1dz5NgzBdFSHsJja4z65d8tA3Cb0xu27/CY+7jHBb/zYntuKyaq1RWKNR7Yl5ecuQxSp

H5vcNNVB4coRhexzxmA4JjWpcQ5CW1PjbQRbS1kIbfB2i1jPu9IhwDmEpTQPwnbSY1PjD114Twfudi+o

jqkdmqN+ehu0DkFmNJ8EJj96ZezMr+Fh1LvgFhLQ0b
eBpcgYZd5X70TbnLmKxGBiQYqP0xCUd9qbA7aPTFSWB+co9NLvaMkJjF867+sJU6WhSPsVKZbNSfGqYz

uJZJhHXRrkaubcaNWNmnufduGcdbFqXfyzXwW58MxBm0TWoP78gM8gdXFKIy1Ic12KNevv/k+njOIffz

KWmE6FTCJzZcoIbwNmhImzAxlQAn1nIF7pXW9tgCC+
2ktDsurmQQWR49TA3xsxX1tFGU73/3Tk7f/K0jqTnVvxRvmIEbN6YfhhZ9CozDrwXATmKhEL3FTf7Ytz

w/jAck45IUefEAY1J6G7lw6DxKkvd4QzI7/k7qJCARNlr0XSsX8h3SQ3onjJ9yVs5y5VrTGMPXmoHLC2

ry3gbd4w0AeV51lfJV53eDJh8uuS6ngSXnyqNc4261
PXxf7xI2Zepr5QG4xatPXCGuYayAxREpMG+Msbs6fTo6yZN1eAkxpumrMQ/EqRS0/5nvPmaJeTw+n2YO

pjQ/vArhKkYeAGVeOQkPCDoWH1D/NzWIXuguNR+6kwl4HSsg/p1u2V7XYcYoZek+yfb/i5LROEZq7T4o

fJMq5x6upJKekKBfoqToCu1jGuo133k6TPmbtTC6Xh
tcSqWjMg4dPE5IdOVCp6f/jpVAK4LsEnOtA5Z34RDJARNXl80zwzBilndIKuHBS+1Yl3o8YnUD3oNxFW

Ib3gWapODDy4vUoq5d9iXXcyfGgzJVku3Bsm556dne6nr6gZDHBylP6P2hOExqcML/mHpcegQmkLr85L

SG9zrB4Ao/Sw3wMzzkO1KmEf4MXhvvi39WC2f7ze2B
6Tr/QD+JyjwKwEl9w4feQ4tqK6brXL2/c37WVVhf3GRlasQ8RSdFOJp1LXz9LnmBxMLUNNaqNH93NSCQ

tcxyRSWu8ykjEPTGL5Jt7S8h21o2wctFCJlK8mDWVKTNU3aPdolhp8Sb37vhTuN+cdXEX4l9ig8oeYde

v9c6ur8pmfh3LjRbVDqA5n0X5OKBXTlryaePD8OKB6
1XyERJahDoT3pQ9e1YEm2qW65hEWViIbF1Yc2s923JXo15mBuWZT0bqIojQ4fVWxBI9epzn77z8bJAH3

LVObh36QVza6lsNUt3vXyuVpxKlgYVyC9Ar/mA/Yyo1FG1dTRBHoHBf+YWz0fgHemJdhBgwIUgYMXjEL

DmoOyIZ64pz16/8vqCZKy4p32FyPPakC312YaZ0ZGr
iD0imhjwTo5bgo4hBfSHFUPJwi9nZUIV8qZaJ8idJr5zkcQkgau+oDEm6yGxMqFNwQUlgqxpYfAN30m6

T24SZIDqpFMtVLnq6z+vbnYK32wSK74LJkXdfhiXAFpySDPeonZkIB3q5eK2YTP66qM2sZYoDpaiM3W5

cIuUhXroRuQvJ7tx8VANurRuKlC4PnZOaGjcUdBDwP
CxGWp6Z5vt6yWNivw/045O4ad1e8hlF99puRVA8L2ZsnWIYqFMZnTs7Ptr/6Dw21XOFnop3bFCGiWpA3

2vyaQ4jRUpfuXe0IARLmcKhSbauJPykYQ0e5nndbSqCtEeF4UhIWIF1g6i10qSJsCi+UY10nNu+oQCa3

pPIrO3jWYo8qyJh4pEE1tsU0yr5QAYznDCURCHYr/F
ztAYA+gDgMmoGKn4KypBvuVnxZDXv7WQDFh89Kt3AbZslUkQWWIhu5XueEyfEq0W84OfKvf5cPjPgFq5

+xlbH68FeWM/DvDSNpVEnjslbImEUbO0cuyR/9XC63hqm3bkQ3ZrA21glede5JREsE7Rsv2tJS68UJMl

KcXy9rj+0IIKc8oggJbTiyUgroUIJVMj54CBYHcq3D
sdirljSsgGO60Av9OJ40u9n9BaYrKd4lbVaZATOj43Uj52ofZwrMJ+dVwX/mHQtDun5gGzVcFPl4aAEH

YW56o9V6Gpj0Cqr9H+QsSWwoErwJHEviuQz6vWpvA6gfjfeW/ZwLChguln1PljnlckNAfxNM7a5XesUY

PUF7uLs3Fz22qIzsIs6pSP6/bsWS0NWP/s847X7m5K
SONHpUNTku7OxefP348SB0pxqHCxpkgFK9iEQ5x7un5SX5YKnRut6t/uWuewfPo2Dc26XAdLi/gJJzvz

eHf1BxnyiPLjYhix6betn1X6z10JD8cfIonNLxIVwY/ruh1CrLbet26XLWCWJcc0ldDlrbulCn4rEYMT

fpuabiCVYfH5uGvE58oCSjWuAvHKuPeBeAJdkJrsnQ
E8Rra09v6ijXwRz42/E8PC7pyk3lYDU8M9ooSNdwHnrZpMgr0LgEpz+jTOOwgj6H+HHi6x6NQw3RAi95

22yR0uSqs+B2q8Wi7b5MnEoo4ZphFcN4xopaRWDIXcxaW5vh1PJVPECGiV6Pi/NNsznJYbyYEC/tAxbC

w/EFzc894E0PLLalYkxcsPLR8Cf7tbVTAthl3xVtlT
5ROAkiIbA2Gu1QSlaISaHJreqJYJFZ2fVy6FYROugySMGDpaY0iPJau546Nx45S55r36iZBhcau/KprV

DjV4xmqGszUxXQIRz8wnaN9kOCZpoID/Gy9KrYeG1U3EWFhjGyeJN1Z0GdC8wJSUohD3KbpzfeKWkWjX

SvCYUXQp/Ri4e+5+j3XTxRhk0fXQxR3u3qQ2xbQ95g
DSbcEUaiinUkGUGupf52ldhg3oxb5U8GWSalscTeTFw/shrizeCSI63wBKGu5+pqcvaI4CTMeyuu6P4F

1Uvsr31yqt1L2wBclIU0MgXC6iymM7jSllrwIDDiISmLl2jmHOkHxECQg6BPvNGUNWgG8+3bvOlkT+Zk

1ZShVsyC6RBq6Kl52/oNL//0XAL7s3vDLFeva2ZyJ0
fIqn4ORtlO3tbHT+sxOl/f2jrxlPrHvxzTvni6LLHWzFO4K3GPw6kpdioy/cFtppofj52RYq0jsp4UpS

LzyJ3j57MCrblxXc5tyWeBFXq/Ot7D1VQZHl37xzTOGOOBnpZysZvcR+NzQSPwpB1EkIpOCPZW0FuXDl

IbF3Zsc5rnKR1o0zn728Clgz+dGeX6vxuEbRgdJv/8
Y+W0LRMxn0f9llZkDoRkZF5UEQF9CHR4kn00szJSmk38l+OralC3v4INzOI0M4rukOVPR5KUDzkwwo3/

V36N1Ht1Zd+4UaI2/RyPvkIJXHLiILiReQUfsl9WPZUxXzNEKNdet3bGfDG8pxXHC+cu3WIbjRGewTNo

3IauGgEclh5Uc9YHoauwATH2XYl+5urS+vTs7XlOas
qnfware5rjOgu2jDIezNL3wq6x5TohYwfierxGHQtex4F4GjeuWnMy5wzD+UE+hMNJMoOsKHRbTS3pA4

5P/zkjf11AjNRwFPC8N+avPdKxNuKfgVnlmC171XepzyFz4g2S6Z3bzLhU7XU1XZOQo6BzSCio+fplg6

uHO2wGY2YB4zNmwcYwse++M2T8BQtW9SFvwB1zDF4J
EjeZegMN9rly5BvMe40VlydeGyd8Yrzk3vluLenQg1RsUSoJt2Trlay3Ky4DOb6omcjQ/3vCI4KSsN4p

zafF7SK2X+PMyS1Wp6TssfSXGAU6gJz6FEPIK1H3AvWALqKtUP4UAUXIY/N4wDJchd+h/GeXBrDmWNRJ

sS1R7Pai2Y4p2YjYuCsHCFFPszlPWn5TqY7f/Sulsp
BF8oUZp/6qIloTMAboPxsoOinkQjNZdyrK0rIdgNCvIRNHAVNTTP8SvonqHy1TWKkgvRZYUSgvenG6Oi

Jm6EsQLLRBn9e/7XvlwvCJ59kAh47BYBQSHv6JXQV/R/CpwLNru0QH61PdQZ5kqFdQX3zg7cqR0pj3CP

TsyapGfl3l8SQ5d+mpbFSQ6k/nAgKgs1Cz1csVRbo0
b1VxGspi43ruOD88Ho5nOAoEiMWE3VkkSScPfZmR/uYzrZ2aPpPJfZJeLZggv08Elel40tpGzzoOJdpf

pVAw4qDr6P+FC4lZ/gwlH8oWkLdj3c7xViosvI97dzwbrjhVeIweLq5E8k2tgGzt1cnnx3AfzCnOisdR

QjIeDyJY7Q5b29+zrhRDV8NGcGthBZnnVIZ/0IeOBK
HUxIv3Izlurdhhjhw2av9DtIG7PKPJSwvDh+KUJx4bSChNnFqSBaQxOL4pNOgh//8XqMo8uKqctlG9Y4

qtqHSlL4euDtpTilfNgdX39f5ryybfM3rEqM76n4NPGK00jBM7/COBsCBVkVRG4JmYC7qhjqALXXekH+

QbBLfgsBkLQXwkhcTpNpi9HUZll1aZ7pAVGzP0haZn
jMz0Ylc1HcLmPW8ZzzlVXSOd0BhO60Z5VpAvEqbnRsc272bdXte2JKJG3mWC2fdIaT2lHKYuGWL2fy4R

zAATZgtjMu2FMsN44WoEOpRZndnon5wcx+weBkYi2uVq604gUbdEHtdsHK+o9f3jtk9its1wsXQWMr/W

YZZLq0t1dYw/uHcDSIk0q5XemxT+0qpL3HWK1QnSuT
BnzG9cSdXb5ldRGXj3WA0JdUemHttFhEiA/PiQv8Fbu1yS1G8zyRRPcFeMeQO1xG88V7SINFoXnHxfgU

KvLz+vlOZA+l3Ike+xnLSOiOeJURHtVZRfq5m88Gz8CttPD8bkB5WAe3RtxZ13zRZXpH+zV9OXht1djf

n3U28kS7NyuprOue67mn2auka9jfQZuu/lwJUz3qbC
NbORps79xTownCgPRII9oAT7CIK50o2NhPcm0o7fpcsxXz+Q2ZKWEXdmmNBX5PYXfzniPpHYBTpxKnj9

q8FS/GunHvmeWBYlkjew7+PbzTkn/BafLpS4i9LwVgjMtYM9lfRAiQgW8mxqiVj5euHyv8bmx3Ljn3sx

S0qOMK88wD9V56P1nN5Tj4sUbQYLMWY0CeLHe5UnF5
H78hRzVU7xYOlrUKW+EjGAMrZCf2r9I5UMUUGUft1iH6wO9Z+3s1e+eGoFNMjlC1Ssoo1VanjNSz64M7

y9DtsbN1b70NdjNmzUOjUpYtxspcJSwX2cgvMo/5NfvPEK/i0fPWfVwf9oaR6HK9AbCFb5UXL6rrUk3Z

lpnURfboxZduBoObysx3ZsJuv5bfZUOxO26xk4cll3
Ln7uyPOpZvr18G0z7z72dLpyOjN/+HvdEwVT5m6p2F1rv1l6Ue9rUyWMdjGhvamZiqYHg02WBVBLeyzZ

GeCDa466F9K1xQJKNmy5GjYYDb5rptIGaEMe4Yi1c2HhB2kpWLh1oT3EbZX8etkeknon3qJcX0M7Y8Vu

naAk0FpOtMUyLzF5V25/qVlrJZ/S90u3WjQ0W1sBEn
FLbInjbSuUqqV8xFnkXmlcEYYnPr1QOXrbnB72NOmUKDK6PAyHFFHc24p5E1SgeZhx8ELRR7xbGsVHgW

ojX5lBVVUkptFQ5/oqfw79XYgnIxab0Zcpel8q50CulJEs1RNz3vC7XOorz8iC3tPb1MgB8zoNFrjZ4g

yN3Et0lCtQcb+8lk5XBDw71QCUbWWayHiEXA4vqaZ7
cjLovv1vc12iFW6tWvEDOXar3tJxaWJIri99UOP50hEeN/MlsWn9647Ab0U3y0mosXR8cNUPjDr+S3ju

lUig/XS0QaGiBiRVnnIQZigvFY1Pq0O0b5RnIt+7APPbziXpZ4YR8ta51imp+m5Rhpk9VL0xl86b3ZPP

d6znTr8DJBV44IwIF6c7/gHQr+rOwe8DWZaCj9dYoS
4uFu2cawjFGtHKhQqbCHu3LF9SvSGAqTSRyOoGWfdgB4D+62tnl5TcsRh53tnwyID++vI/cDchvl37nV

g+MrYg5bqRptvvtW8w3lYkZTkSdaui5xBOiS9R2iH45pNfYr3qfMEGRDhwH2DoTdwdgHzc8+XeBwDNLb

lx3R3rMuddG/Df1N8l4g2NGasgCl7Cl16LqhL3J0uO
pp+faG/6TBWaodqJNg3SQiWTj7Qgl67SphQKgyiHDvMz/wn70ByFU5D4qBZGFoTcaCXwS5WDgXgbaflg

1jQYbM/86/aeamU/Ljllm0XNriQI57BXGgp05sPdSp+zydwO0SJnlFjT41w9lJuE3MiMyuW07zizKvyb

mej2Q58Nkz2vWveW/70TSj6ZgrMV/e+FL1g7OvhhDw
nQgXUmYIsQIcYpL4YvMsiwb1ly1oU7U3P2S+cDKDpbIBmiIpLjC78FHyS9cZ1qU7MACPVTkEfwpJmf1h

sSXTW2LZa25LIO+W8SaOI+vfh8MN7H7FJKLmW0rFb8PF97ikzs/c/SleNcfcf+1DQB0GBthkp78InZFl

/uKQf80NYgbjeznwwjl5OlWOHTpWgnykcptpLv+x6H
zVAvmE9gFlgY9w3QIcFysdBSs+K2THU+XAfYsq2Nl1vKTYvZhZ4HJNCfMmkGxJPnkEMZKzBZV7DupeFc

RRILeXbYF68NguMnHC+FYrFx36prwYavfYrt6w3g08rfibQ4Hc7rW+wOzRkVIa26sg/AT4Ht76ZAs/Tr

hDHQi1eFg1n8IcKXqP8+2C9iOpOynfJX7PSulWtanr
tGypp/vlpigy2XTjCPj8sRgQxX0Jtk/XWRj3Nugr1/TrYs6dLchliBhNVaCAFruR6jVfIy9Zq7L4mIUa

878opay3qfg0BF1YyIHLME5PD8S3wYlV0UqHaw3PlkJERWy3qB5K1UThwMsnIboIHaR823Vn8zj8x97j

TaIPU7iPPj69g6u5qdGyv+s1Wb9zd4iwahqMDVUtwA
Xstsd81K01JIjEYiwuXR6DPkYCAvRr6IexLHQkhsCB7pRbTsGjf8wUML4HYR45aZhjpVEnMno8t8d3Su

yHsOc/NDjvA4zfDMjf9W0vDMTOr8swVZcW6F56+NbO+GLU3WSU7+2KwhygweJg756/R0mUc49TBwezTb

8ayi8vpT2TkX136rVPXR67z9VlMt2b7ikHspFmF+nA
WEFKjrzMOzuGcH1wrxTcQ0OrNFmObndB6UvSRjQUwP/k85alXCxbVL91Nw6cd1zZ+9jC2V/aOM978KFc

+dc3nCjqokTeNueyzWYtxXsYJ2n+iYms5Q16cfNbqncUwz+ui/87atKdrnM+VyBga7WXG09Gp8bePN/L

kEp3/B2R7rZRBz1Cb01loCgHai2Hlw2LWsQ4p+SVJB
d+vFZcdhv8qiOdqKVd1OOQUmM6UrnX3rittn8UY5CZWdZL+Q4O7hx9ZfdczFbm1UoS8oOiF9wPwq2jwp

k6xkAa9dohKB0f3lHglqGM8+0x6Zh5MOK9VUS7X347OP6iJwNNfjuFo4LruoqAQ5ftu/k68vv/Q6IV5y

yrvwxg1vwq1PPcLyQBl+DFzd7A5cbS32XTpeOjsrNp
IrZx8BetroSINYg8pCxXgp2nD4LLe1Jb1PmninaV/2Bzh5vGQvg4mw8fwmiVCx46NkT3GU3pibBpirv4

4Rt1WOp1HEd7hdWUeMxDUWybhU9BZSg+tn6emw4NclSgkjxeoKjm6af7Ck+RqAA5WErjoQDLclaX13iq

jrqqDKRcG9gTNRoo/Jid8TOoDukcUXSk+iM4PmNqIT
Ua3sqItsByjRai68MpQ2UPjXfeK1ilHW+v08cNnZRFf14jHJGuJYu6zuI7Jx0aKR9wfitpbUvs+7Xfb8

GKfS8UrqnU1vKl2GBCd6VAGHDCMi5Gzl5HTTSIfrfyzg0j1MHJAi1WBj0kJCoTM5MbTjYZuVJdYrKnqw

HCOSNHxWaJrWxRaX0i7xw3eJavoblgpahoD39L454v
m0s3THb0ANWkkWEA5KCjXGTvI9fZn+y2G+Rbjn42huHsfCoGeOo9rE9Hp/R9RKCrSlfwqu67h+Zs/qpL

8uHgd3/yO0nr7p9pz+Yt7Vjn7V5l6QnLSuOf/6xRq64md1OvQIbH9RXj70EpNQioVkYErEar6AnGdJ1F

4aJSWBrpQm2FCWb2XLU1ya9Za1ypFUCrKRw46D+YnG
6lOqcIog60NMHtt6dLgkNWRMXWKBfazpmNQ894573R9h7zBLz2vL3TGUPtxhi1yQ3COGFzSBmgiLIoDB

mPjz6G33IX5+JJGoHitc1mXHSdEDQ2iDF0YtDpIoZfiYcQsYoBw2Z0qvpg8r+yM1yeR3dV/urvzraKnZ

tDSVTSFDG/ZZ3pbv5dyRtnlqP9e39EGd+dfSWBYClF
g1U52HS5/T3vSRcedTVIhYgkwWoK+w520gytQSwanG4N4JHLcWRy7zcLTY8FBXx0zsCUAcvnselP8VHA

wuxCW6lpvNPBR8jYcKdo+lI8fjL+xdJbkjiued0ZTMq17GaMQ9Edd8dBmuHey62RkrTOG7nnpzvERX4A

04hsE2wDtJTFRWo2ZYMVfyvkY3blrYIrfgEX8JNviU
geBaB3DgptnGuTDfA69J98dlqo4i8aANyF2ZJDihH9F64LEQ945FFS3sGmUM5H0n3Ohu7/O7FeOvnrmJ

grem3npfnZV7nZxPTTv6pl+6Khgkrt+fRcsXk7lMwqn75QHxboUsU/vETY81CENLCZ75CSuUgf4FA2Lc

/Cf5GxBI2l2Q6/kwJZpFncRHUz4O0lhl4fjHPB8aVi
NbGpIV7tsZOQ2UIwvYEbvzJIQU0jDclO+gZLfcxKDAjCMqGaWsqRWxavi1UpQ8U/48MClxx23lV6tzH0

AaeWaYwvv3T+3VX4kkH7VcYdDpSRhSkWuK/BnIoF4+KDyWYDPIF19JmrzDh8SLtDh7K4t4U4vOd91BKq

tIsmoa8S/a1uopyfCWpqzS9tyjJlZxqyZtqTkz2pg+
alJTv+u5smDbNWWkhhnQ0SNpn2RNY5NWkmOrTeyQC6p1/CrVPJpk5QjlfjXd2D5JuYAGgy7jaconEyRw

jMP/J8Xqip6JB7GiAZTxL3OnX38tML8nDPOvfl9OOwMasKNN4z8PaGgWOWND3XYhx6/uMjeZPhbMDhqd

E+ccjbspVIg8jroDCUkSAxQIHUtLQDzEqNKHI4O1yb
xOuF+NjQuWcsBV3g6vgqQawFyGNGigLFhQCUo5xaJRk6fy4MJZP9VUF86Rqf8YRdSJb9bXwPDfLsBiE9

cjmnumbEVwZ/LD3GY9v3N8PwbMRL/ISU+cfvlerxjG5UK/qn6akYgSpMDFXn/FRFwQROBE7hB11Ze9Jl

gpzCZfDEiwJijgNeDlLKZRysifwTBKxWtmsUFWpjCI
GVq65wWWzs3Ihi4HecuDsIdWXxpIelB0Vkgumfa8syC4Qfx5KqHGheoiWjSVGWCEkrnjjWrfTz2B7GwK

ig1vQ6Q1r/e7ffpP/cXkFBI3N76JqONnn/DoMYpZ1aWlaFnxnHJSlkjqk4JYu3yjouHv3RyYp24yAG64

LKQPWr082e/dPBS1gOXW6wa/L4qfEgWPiCuBAe1j/m
fxZ6jEB/PgkSv31x59Eb6F50m4M6Puk7hde9W4z2oOuurhuhZirWbmZqbV91nNdQeO+HKaMLYgoovaoa

jm5fzIF5W8uwYaZvJyWV2xkRGGQjm76P1YXLM8rjTcmsnQgKiPklBkxsV3D1nj9yhq1l61Yrdf4CxtUs

a75I04hiUt7OMC0t7BZuqYuxqQvOEvyLmd+fK95nyU
yEKALrRnDUVhpikW5kO7PPo59Bm9Gpom9LXEKjVEX1qyE/AnTMi10Sfteg15pwNR8et4hKm+6FBmlvfM

djBZImlP8wt5ITs0Iqe+AgkebfGIEOK+zKASVDumDiU615RN598ecRXbxVdd/przu7/19CHeYADJwF+Y

sTLAbgyx/LG8UYw70jVFhQess9mGnb6pSYZ8NHJe9l
BXWmEYrMp45vOy9/GWilrrYEq4Zrokrns+f3TuLd1fqcjRootZRO+GVNpsPZi6aTGytkBhYwxG9gnSG+

aGUfMSYXekaBoL2LHF+e301TLPKHFKz7IfXYvQR3RWBNTFPCm9iNLZBtK9pjUY+ho0Wd6YLN4OBaYfUQ

wxFxOOXcoS3WytIwHZ/7gwn+Nhj9u8LefDsNEXWlB+
5BYVXjydjWD1no2bEdWNG0K9trL0AsnOnG6SS5Ju6BGWRZYsMRc++zL9c/NPo80y46JO7FsN4472vg6s

CAGf1ZgprGDjZnm7XikFmARJx8ljAZVhb4QeO1VjpX/r5gYP37SzHuBwWIoA6nbPc99pafFH9qVU5Roc

t8BzeeGGWG8CZF4VnTxBDaBt5JS0InlRO0xGw76hX6
hiBYCkna+sMBHaXKAvuD3xh8M7WOzFaJ8f6N5fkScUw/3jWwehb1kbXxLoAk27N8Cyk3DCWMB9iXmgRr

qxWRG4P+ShbQl2CjD+CpQrNscEDFzSK/3nJwhqW1Q7PMI126vT3RNl2EP6fLiKQ0Sdrmj4uumWORNcVv

1qxLYUn5+Zs052q9h61Rc4LRf+DH5H2v54UpuRx3wx
SC0x+aKE1O/nE216Oo7p3g/tdbnhz4RlhsrWEJuPG2CfxrUiYvbIw2dp+khYqobDjC/bp3HVEF8V9Jrn

MsTUTToWSu7NWEAY+hocVK1lTx3/0oNYuuyeDrJNAcufiwmx2pbE69X719WKVqg///LddrXrpfB7Cv3T

kqM/xnRag14rTDJH82ySf0bg88yPGhh8P9Y2TlgB78
pLzV3zBtyq2nymTGk8XVPVapoC8jKC2KjBW9VKVHtt3NeUPlYTAwGrCXsEsMjD3ENW+Yfb+wADPMRdBC

dhDUJZEY3A8FC6k02w7ul6pZjmm7QVqHsmAhyu1f/mBQ+w/tLqr3BSH59EhzCX6umVeto7JjNHEbIwzK

Gd8KmgOlgjado5725CGkfQq8cCnl8M2Tx+XbL/R1b0
15eM0ZaB/FpKKjBVwd09bR2l4scA11vbw8+TzSHvdMgng1YwWjn7l9sCOEc8ISj6Dp+ejvt05hkZgWZm

AEdUhH0wsb8CYBPIY0XA30sQvz/+W98qMUAsUoAg8hT6dBcA9hX8PguBx7b8vvOfZ+LGzLIFH+HmWIRU

ERFu4jDhrYUEDaxyQp5Z2/SVqFkIINd/fCuEpLnLLD
PuL3LiN4sbRYffjIRNYt+jLhQjxg0x/t8vFaQ7DE9homCwhsFiDRWDsUTXB6Gi7tB7eP42i0cQlAIdEm

BWY/+kdeJ5XPo+kVpLAjt0RNigVlAwjEXrKbAWz8+DZaJ1YKJGzHwceGJYH0sg88yyrr2nzVo5Qpd0lB

iIahfuEzfgdaywrsEfq5DUuC2KNjM9xDZqTOEigqmw
sY6cDdKnV1bIkIl4JxkW+WjS+WacNFNIQuOl8KtZyjVdpllmKiN6fWn/Zk8/ptLFHRSOKqM7Ca9rtdxx

Bh2Lq7y5IJfccNmLy2dHMHnE4IlVVA/wDNhd/T7lV43Qo9drgJ6xPxRF+OnJoNdHSIoyhHY69FbmmhQu

li+/4ysvhj0QCncSsUj8/nLr1R03ocHpYN9l3qvasH
dY0LWWecY9AgDq8EKgWh8tmWvWDqpR8AVeE4coLiqfsCUGt0E/eH6ygU9b/Yjbo3al/tHbe6xVMDzMQg

cJVCnMe2gbTVUJx7Vqs/yx3xtq1Pt1AzhEjtBnS5KDktiGtNXFkW7SaUDF+vCbcZtVg8MDYFRbNhB5h7

74zjquPICkSRs7L9yElR7S6M/A31MFlxxowvcZdMZH
Pwc/Pd7+WAeOYIZHGcww3GYIjPYCBHt6upxnMQICLvD1mRDsMXU7sv/M65XQ3zZ+LAZs5FQHBSs3CxWY

rK8Yu9zwvpd17Iaz+vDQu7dlxDaMJuiHqn606G8Guq+zMB2qcrNwnaly4lb0zSrU47XZ5ziMu/tJ5iAx

GK31Q54bIVsfzFrnyJd9Y16bE8YLZ/UrWdNSppSLJ6
78aq3iub1e+e7if4qb6P07TbVF2ZKZJgKjBIooYwkNy01t/uV2jm8Fg8CQalKMEj3zHMLeGWZ7V0l8Zg

Dg00nQSQ8UYswIBwCpRiIJCzVU6CNXKye9lK2bleyAtlR5ELYlOFtJlXSoIOJS9fKWr3XchXdzAexYvS

cyWgQGJErcvSGUECLuIf+A8UraeqsR589zzvg1UgtO
0pUStIzuFvfJQRt/phWLSpagtUzoXl7MfwtbgniGu13AEVl06DcLnqAZEAaBBEPQVG3WvpZV7ICTxrPn

8axyWgIPXrlmXLiD3q0dggpeFzfsHQhay83qlxK8E14ms+knv9bqqw54Mf76b9500y47k9w8JkIp1O3/

RgH9lxFqWAj9hVf2G7CfcRRAmoH8A2hPlVTYe7Dqyw
UT07XahalLyCPg54PcgyeWnDOwVG9UJbxsFdw1AlhaWjVGh0O9uDQydf2Ln4ohmIBPKp7GsP+EYvq91C

C7K3VEnc9vzBJrfGG9191gN56HZ+TNhOBnQ9OoaKePB3sEKdfY+0qJjbfQk/dH/7dl5KIOXSfoPYNg8o

75Ty4UM93xhJ3FT26lZeRerkh5jDGi88umBT109l2k
i2oOuNHUelKm4LVGtPiIgdF2bY+hLwD0n1OPYXKcTnaD23KI2prAreiLquR3LrMGBj3vqHA7gvAWfpmd

+vt8Rj740/9u//THdiesgvW3ept4rAF2OktTaaIqdfnGZ7Fv70hu1NLfzLvnYFpC+wQ2wr9qkvR586AN

zKAfiM7QX3Qyd1u7HtPMO4+AVyCvEDmqrxcSQdvbc5
ODh3870IBg//eemMzNbseIAB0CBRMd6FUtwkXdWOYb3azhZRk8q4Y6aPPUJbOGtyIYCuc/4M7PkwzpRe

FjN0bpvLek6LL9P/W05vnK8D9wzoHl9xFIvSa4Iw4EKjUiJnG0C4Wn+cdKZxg6KAk7G9pOIC/ljXziTx

txbePYePpT0BUjdyvwofaOavUBoRK7L7Dc41ke8SK9
d5/Xf8F6Qh2h2Ko6FdArWzLVzoEuxlGLMCu2YRFygiiIiUoF/DFecbAtZKKojLA5XaL4dRovdxLdfACR

jGOJMPA/Kvp9g24kruSPCxDLkbC8N/fZZvcFDwCpgA/xjVsGyRnj37R2WPMHumKUHEytPmxXyow50ImC

BASCfDrt/8JKqyEgusJFg9oEjpnUlMAC7dQnyBoYsr
fAU1eBiyiYan2L9J6IaZYb8b0CUlhVRlUovqwsNyB/Ktj1m5IyYuRnsoCSvAKslDArvvGFoiPVZ/juTc

2ZDZ8bSN+/5tRzFv8u9yT/rH/RD4bZEIXX/S9EP4BE/wyr+j/DDLA6kqSbEH/77YqFNbWR3KhaY9745a

RHG751YfaqecXLR71E3tDac07gVRtmyr0Wkf+ODBj/
9iSnWma6bIFl0NJm8v/ZzgHaCy4K3gjlnsf2GRIudQsw53FU4jAR4IEg7vXybqlNRNWR+527/X+5f7l/

uX+5f7l/uX+5f7n/o45fl4paWawmmPGjMSo63IeanfUVnuZW5Dj4zGeh/V+M2NRvflV87Edpa9F0whb1

LOg/S3QDgKKFdL8hZf+oOxmcksi/zNNY2WIj0qNn4K
QgAfUBPaJU0N5OTYx2dRUhb/zyyLwl+Il8A53SEeP/LveqYU4taMgjm6XT5wmR6sC2CBiltOGlzg1h1K

mzHq0/PWphprPm9RckIRwiSQGUZ0tq0Y7B0dIogbmg7lk6dZNZp2+CKpC0nWwHWjzb/hF5Sf4cEFJSVa

kM7frTikbqYLDk5PzFpU9FPLVBvym4O5Fuyp9aUDq+
ysgHGknSznpaMG312+oU7NEZPmfkdUgxKsIlx9NhxwKok3+FLKDJbMxzLI83E7FGkS1WIuYE7V07AA88

yPEXu3NjAa3RmlZHN3xPLnf1TfQ6vr2Pjr9meSpdaC42SkB2ch3gIf7rPu4TB8W4uVzGFHwCOBNl09Sh

pnJbkuSJmu1d1G/UbpcOjgUHeRATVPQ0SUqF0HkVRD
1rvw/IiCY6JJxvqlaxbfIfG5DLL/X37WmbXq1WxCmbEhq6kKeDzgK/1dzMvr2Mv+VwuXFoawxugvT6N0

MwVwE2ivLGYNKsaoZcqut1hQagrTi3XIai+4foiLHQDGWV3Cuzc48Wv/3TSeyeHPKCw3B2TP7Fq8f4Vb

Rnm8btmO41Vt/66jHNjz/Ev8PhnR+ABCIhSoe0QSYd
9wytz9VqHfTFmhBLnXdMgpWNPUL97py9ZujSljAIksSShXGwgvdu3KwQwi1oL+b0hf6mIjj1XTEJtrpM

X1oQJzqwZNMnlv4NXi6fLCk8khVtqe0VfndKDXrWgGCLShFibJoBOliCtgtErKRbUxwJim3cHqiXZHrn

JZYul95YqopGUHmxavvA6ikAk3Rwl2RTFZ/yA6C9J/
dzc8fNVmwEeilKK6PHcohq0iZEwEJKFIbihYqxQP9Wmfex8o3/VIcuk6ADdvMpVGEQCH10L1R+67TW6I

7l6H7kxepEALcWtO8VsbmOmtxmY6hN+2am96eFrpQEpGXS32F3xFzF3qR2WEhwsPlUGLApLytCjuBmWw

FY7SxaUPnpcV5X6Y4wZ1QBuMhOYS5d5k8ox2oUWW2g
JZtObDw5T7ARcEQt1ucuYaecyRj4rqlo/uS3tkVedJ/+PVvF/mU/Z/3lRywdElQGUwCnGbrbJ4d6TJPk

YTq3Q/Aq2NtkkE+FzVCis5ZYvGri4H/tD7EaHrNgeEQ8s1EnSWb18yE8n+2mSOzgX0g6OEY0/A2PniYE

OCWm2zik6yqQtoCU98Wjcosgguxrn5rRqw/Auamtjc
zFJxJV2lwbDhOTByu4lfTtsg8llXwhELCVUTV4osBVIJDV3dVjoaI1qGjSqI43RUOycFbF/fPeahZJzn

omrJbZbaQgbRuhD01mfp2RXGy5jTVJ2wkoziZC6KRlPAB+QTLVVZQOSCO4AGHnHt6gt8TNnhDmLuHKCQ

jn9CZMPOywHS2kCvRAdVyL5D7oScIh5vkJ92GMtH87
H22Dsk4OxPjdO3aehWK8lZCOBNqoi9ITT20cKYjxhWoYCzHCvANPnYzym3JUDDM0WS90xa1qd0rDyY0B

6VBLavkNFfkFUHWiuz1NbWPXj/17z8j+qlcZ7TxWC+LSu72X4l7tB6QBLVDuA6l/Ung8gTl3kaWhrS/s

DX3h0fz02v1vkK6mt1IJOPeQxbsF1AsdLnk0Rmhalq
mkqFM8n9TopCQfuk+xRlBMc8ysAPOwJBptBIp8lriLLEfWKzwcmXQ8juC9NGbccAZHOMWZu+O9iejmkb

8hHDyWAYjE0pJaJJiYPv/4pLejdU5IUHvPznQoRrjC4F7Rt0+XOl+Wgt6czmUxsAyJpV4POAmyo6QgjA

5tu+EqLAQVJtnFYA5dcwxhUtH95iUOlup21a2IcgfO
sHeebzMJgKHq5XJLiQK8Lsa/rgSH5rsbJURky5BGZHVEXf4jYtYjkNvInL79Tb8dvc1fTWtizH/MX9Lb

BCd6ChbDHCvkMr6nsV1hz5HflpfM8aRscbSS0fCqlmxY9ExCGFyinpR564TKumSFWsoWEI5k0yjTYiyE

7yWopg2cZnoVUmGvgdaM+6MWXesadln9K1zCVSvFRc
+aO1GeGlZjzxpflgBruADg1+0uQQwPWkO3ns+X1QyZoAhWZJdHUWeyRRNhCZFnpwhqCyUKH5+8U6S+KA

stjYJitK3Ft/lHp2nmihy7eecf0ZoJ5crdVGQVWWhe/dMOj7PLXJnzjkGsYEOZiuW12whRMI9nrnDME0

NPPwAeQfoqaxx+N9oRqcphf854UFTkztu0+6cVQi/m
at/YeI51G7TBLzeg1PcT/DqZyEUPnCvQxrmY3FudYfkbjXYfzK2SGHs1OQxO/nFLBAFRX3sG4Gqph7ue

NxHqnqlaafZQCkzcpxBtWXADerBdkYuLPWDJQ8vCZu/w2teCc9rsQIGvMn/Cx1kfPT0vrCdYvHbhkvu4

K1hpJx/Rc2lx5LXHxJw8It7B4tAK/Sv3jSVZrVZKzO
3LVH9i4c2Jt9GR5hACAQEunnzAEhuQHT+2qD3FkP42mobYhmp0w9ZChayyPg3Qvmnc5I0LZV4ddXVm9i

Pc8fbQAk6Auk35RBo6GiJlXPcxXqAkqBi00C8FSFKxB2dNZXY6Oc/ROlEDf5hDHBKCxplKR0sSbd6HHe

ZUG5YbOmjNTEHp5bxQ40FojNRBhr1rQVy8oqKvt494
lgJDGS7TCc1Nn3djepo+QZBHt7noNX6n7Eb7wi+CBKZ2KWziSkAztuXTfE+ZJnk7/niRWf+gfFmfo7ZG

cV3HwOGVt1bp6ytuJL15DDPXTIIhPDeGCkCRIdMuCzZIK+wuEzjrqzRP33n/0j0M51HK78KSKEP1beiU

YkhFuZBcQcPqNnEFU45NrzRDwCjEPIFUrVjcwl+bqr
vJGbJJ0D3/lBOL9fPRV3sO97CEOlVOsw7MNBC5V+ksMXafZ62mWQ0wDTcHE7E+BnWBNwkZOLzMoqWqez

3k8+Q/HTqbaUqK736nsd/XhCvrYWosI718mBIbhD4ojpqzYkmpyx3R8dpuLt2U5sjitRXze+nqFaRKZe

owcfoD5vpWfwLB6tNEdjvxhncnf9QbSNh99/usxcOG
IDZPEPkEN0Ozf+lmcoiMy0HFPcXcoLEBpCHq0b4jh+oxFpgS1+Qbd2tqRFxduaAFTC6qZ6X3gGesE/37

pCH2AAUqF8n4vQjjmF/GiVawGfb7/ejXihPLbiiRkISwhrECPPk6W8wcFJ6asEUYx0OhLPZ67vUfIBEP

ze/SBJBWL2ABg01R3OfesfSGTHH4gA/9+mVt655b+A
vAYkyCL/2TBqNMIHO7HYYJU0H++sHlgYbd9ymTwtMC5SLc7+nIq1DgQgXvGHANlnF6s+XsLVm+lr4JGv

jr/aFYbTo7ba/Dhoqx9zG/pOyBAKrxB6SKX6v2l9HZNNJVo1pDu4/sY5BGHSUSkEIAtCwiqgriof++tG

L7fODFNUIdjnFKmCt0UTHmL3aK6V2AMi/4Zr7QKXn4
M7EOifEwLT3Sgm0xNY6MtbkP37FCPPQioaQRE1e3FEHSP+NRLuFFchrYpbXA1WUeuaMVm2qng0x0aRca

FJzmVzLf9KT09XsJ1KerRZrx5O7Jl31eIlnQ/79qWZZPl3fNAGmk9pzqxpusD07+viKSbUxeBbrQJdhx

skyQypuRr7dria6EnySUglmIuZ4ZDip3DM1HlNemxc
njDuPi3xbZ9bZnCnhE7lWkPBzpPnpH4AeKuNEyjN3tPKDq3xJL/IW8RqaKb5xMUCJvl8wKnZrqxQomij

pDoGTeFg//xOITdOv7nw1WDowbgJKvKdufAKk5UVH4qxqfEdrqhXj0WC+Cb8KKGQj7fNa3DBeW1AKOQE

ecv2q4daneAhE3A5+4EUWApPbUhShZ6XpfuTT/cLgo
YqeAYDFBeXCGGNUqto9WT1uik09tRiv4rc8/kgYLrlp1zM/Z78ed4VctA6Emq0sm+twqodq5iw1xk0VD

BRtO6JDa8WOCpIOxS1075xeq82RrDSXSXa0QdoE9dPejBAc0OKD+IjafdQfRP/sIUZ+h2OsorymshdAL

tzI0XT5CJFUMlUQotpg+ghKmw/qO9r/9KVTyu5xqqU
pneJQE0nIh3PTxKQHlOZRShu+76osmj5vRYh9KsNJng/lBMEU1f1pmI6yjadD+UHH8/HOmxhpCwA68qL

NNccxSfmQq7ZtbJVryOJw64dV1wCyiVfkdTfrcX+cuOZl479q3mhdUtgPCTl5KHY7cWgSa82QaMoRiDh

AXPUpDm4N5REEtdS6Ubahd9WX6z23UANPNkpUH+vBa
TMy0HisPFQcxGxgyjQtuEdhxAZL5ekGm/PYc1tlqB1J5cMB+05xjwK/tPerTg5nlgnduJv6fk9aW7D+t

GZ+4aSaYYrt9x683viHVtQhetO76SjosJs9DSnwpa/BwYfSmd8LzSeKGZcYeVxktqsLoKXrJ4BhjNxiy

sDu/59tYH4OT7Hr+oIbUAsnqKzS9w7XZbIyYIZ+ovr
wyfSewRc/GO2Hf7vvoSo8FnqahpgqtS31MIgxfb57RIREO8Ve0kTOvVXwDiqfl/+vjCjSUmb6Fz3/LGT

AA+2MMet5xQXR+7nB9MN/odylUVFyZrzpSaz812rGCbIbRwIuX12M6cIjESq7FR+6+F3/a1rQHVeSoX0

MBehc01tunUcB4qIojczK/1qvBnY6bHpv2G8r1TIP+
rlTcoyw0SnMYK8EGoTyLEAdNcCKDaRJdfezQl4IBPPFpRnJTOGBEZGtLDpn5m93j3eAT3FIC5NX6ls1F

XWScMgc6ETJ5hfm21++Li7htz/beD1KMgUROQTX2OeM6ANQ/wQeAh9QKRFoVm6zOawWieWjy9EaLvqkk

I0wudI6z1X5RDm9F9MBuNMViOAZfMiX7ysyuZgVLpc
pj5TIT9SQLKGeVu95ZNOAkU/n5SBoY9+hfXdFmvrJswbWlhA4s5joAJpx8X3tZ4pa+6JDSzq9RekWJHC

8SOY+kenM6HDiTCvacgdPPxJxR7d3Gk6sYp66EAq2+9HJ0FdNdIWGV9d1jzUjRmmUYD1a9XpKWnkDfA5

867FpVwrSB3rXMsgWSYVWvo1LlWpvy2jPkvrSlNrsU
5CY1pj2PvjlTdV1vXh6Fj6S4Ir3vzbW1qkyuL+ByIIqE5Yy9mfFKg/X9qM/biwl3OKBhP916b1o7xiHi

v7QBUczkdS/vlpmAUmwGvpqFiCZN55tojVozikrA58sUqnrR+fuMmlIGZaTRTwW5fS0pKTuncxCffsso

qL7L4E0FLYwPkR8dSaLA3DblUY444W+kWUE4YAphuH
Ft+xYLdmpcc3yvwpVirMGdLXCN0SKlmMvYMnG6I8ra3gQaslroZsk0nY6dAlTydZx/x7RF+UKrbAGwrD

6F7QxZqfJCq1CcSVqMC5vuBOXE/9gjocA/mtiJLq3E2HyR1jwJxQgcZBtA8qO4aIsquPeHjtCbhlG9Nw

/DOrpIozYVsZm7yXnS422Sgzje8ErbtTBwq3JwiiEx
juHnmCLzvA0UbaLrxhTqwvAp36eLndvhASaUoFC73LevFFbdVTJMO4CjO9wPYIttz9dPIEZ3p6ewl7tk

OSun/jiphCagxp4VzTaFIvflEo2rhuvhWCVSIj5Brr8MifyZvzQKvg9CcbuDLDGUkoK8FKM0o0RJPaxy

XG5NSnvqg14GJcp+k1Z7e9t6Wmt7qv6oUc0vtFMavs
D7TowumYrChBb1wNP7yubAvGO0+VxJ/GBRH4XjPC9iLNqXLSWp4m8h0JuKOuUzpxf8N5vpGkLgT+C4Om

VzrBgns1OwJ7XkXY6KstzDcQrz9EQlF3DmTaZlywTEI0C8ekMYFBoTiK610jcLCheOjQQA/5gzrR2Cbo

8ZTwlXnKm2pcpx2e26YjOt473IywP5Lj64UZiZhf2i
9EOIpFX8PfSd1kBWDE389jHvASi37W7vKdqfjqdURefX4aQBQQGQDJv+AJAPh8bKwT9JPOB/P0jsggVe

9jmBbeALq7lVZd40kPI8E3UkOE5Ef6hTpTPH0+ODBXMwhqVAlJP3vlRVNfXG/X3QdHhuPvMaxB6XlJpT

zhcfEid+3UB8P2B1KVfRHWyx96wGuXbPxrvfJM2beT
2R6k21I750v2G398jrN9fSifRVeh53EHnxhFxBHQFGCkL+pNizEpJxqkX4jGBiw5qoQD4En2Gc4mpVmP

X5BtCzhKayOQXwxGP/giPMRgFFTCubfnEGKoxebPGbCqq5OT2PD/W3IelgfaRKXiXJFcQNWTmgzWLm5o

S+sBjB03PSc0K57l3OdKO/S5PA8gZQY0TnzBsEjZa6
fzIB42mnkfpbyJQVu7jgavshEUBQZaSZ9edBlF/ksz9fh+Z0XPBtc5s+wdBLTu80YjOci1TOebnvYl2M

bPYr1PKFcAIz2C2wCNGyXtTITHxEq0UZ+bgcH9MLrtd1RNrxz8q8Ek//GbMtiuhhJNAzXlq/o8XyfcI/

X3VggS8OJa02d38gSFucrSzYGslfxw67AmmuswH6HX
bxD83n4vrPEEiG0zFwafT7uI4zuSWypze831FhrFOZwSYR8LOZE7Z1UQ5OvVGtePLFEJw+9YOrjvUUez

5kpFWhEoAfQ7Per0KQRbp0Nt+CyCC7IvO50R102R09hCJ6ZQhHQfNBoLzy1pdhk67eYgoYL7HNiD/LDH

0VMyoOTtpiL9VIMxHuKPBxEr51MtKkLdOPM0DDrJ6Y
wFwNL6nLJMj1KF9E/NhSEvHf1ayJb9tA5neSEepbclUjBcFzSuiqragQ+jEwEnCrTjR4M0JehqJ94eSV

69/+OrQKFBmjrNAkiycxNgEfZLb/fPY47fMqjJ78BezW6zbPqR2xZsmqVoEMMhbO+qaG+PLgenoYX9g/

KoMo8lZQWimN23lI9l6sFKLOgZXxvQi2qx7T01XBuF
kyGpHeGNr1dYcGIPOY/FfbwKpjJy4lVhi41cEWcT3iTS2YqDnxlyTsF8dD8LMM2vjnGnhRqjmJWEsYgi

yfARyFbqK6iAdSAovyi5gkyofuAt/9+bNynTfStYOxKE81XqbU6C09FHrk5PjXDVJcvDskRbs/QpJxP4

B+Gl6L2GsgGuen2eGARMgq3Ol1rGEBV3rCNdLvuU/j
llxCWH7OPpb9rYyL1uIxhCJLgHzr9NvxyQKM0vJ+LNv/RucxNi2mcFC4jxE6JTBYZX+Now0ZAwh6dVwX

lg5NB6u4gu9QjoVYZeRnNWT5AgVOCMj0OYL9Ly671SvgndvXKPF7fqAOVwo0ADEWPaUBi+xmfCInSkL/

p04JO+rTfA2t+yeXt/1bgdkgG64WXEgzM1G6vB+9qx
Og6ZZ+vPwXrFVikmQ2Qp/s3fTQlEMHkfjfCdHrJOcd2qPHbj6olFrlJckacH4JEhmWWQth+RGBSqDLvC

52i+WiZwikHIMiTNZeTOHG5LSVsxbv7NAWFHH0lM1T0C21J44F8amSiXfjJw2voIg+Jxg5hukdyeCT4d

uATLcMh6g9xmziqaL6TMAANvlOkHa0oWoBYlRGF097
OQ+vlGWuSe+tyKVY4CXv381QUrmMlQrtKIX3+ttkpro85x5YgXGhRIGGFjaLoOKgzPzXTVlFvBcfbI2L

QphjFmPLEj3Unfgw491NCjqYLRiCM4CHU6t+lRqywpuqloRgkuR3d7ypnT2tSEPpux7lmrd/CRhHw4WY

6Bq8zL+Lgq8nIVZvunqW2U8zZa0lhs76RmxFhJi1sZ
8uQKUpoYwQUpdtMMIBYnfaBMl58ycoq5inZGXfvtUAu4VlMOvXQGPfEbBNY60xe+OuLP85HKFqJ67A/T

PVtanNNdFco3I4iIfhVj7+LpDDupQmHV6D/BKUx6kFgCEiqcG0Bng6Lv2TURTSaIL+04o72+lt+glsD4

UQqXbWMGhI4Fs29Mu3+1pPA062+JTU61YrCxVpc2RI
XQQS5dRRbRi4FgqUGKFb55eGO7w2EcpDzG6hmO1ox4pjRRCXAh5hlZzNBTrg5/d+aYXRviFHOawplYor

tecDxhYqGq8Ti7MKs/m46WTWIZibikKe64ms+4SZ6eWUpAljmQ5+On+1zwTnHswZrKKrsXEcK+cEGpO9

xboJuP+wAUIGQdeQJOot1s3u3vULpxmPdsM1TAvcX7
RYP1MZVaNHG+Zv4H7LhrV2l49m+tSOa9q/4AlDNGjGPHmCOnLbuiczIZRLaa0MmZZbDvtE1nzmzYpP4w

mWLb7IWg11hxKvaHDGBcQnUzIDqJj0FG4S4qcBOwdcTtDHDba6KBvhAbWLKAb15snjx0kVZJM0o4S8zY

cW+NfXllzPPi1tj8U5d4hh8uW5RVD/RYrghcBYGW+t
QxEvVq1pL69mi8UiSwtN5G0Cs31bsWshw/ECjIQKYtttNBZ+INdiI31BxGE5ep56fO3THtLlQMx2m/zl

AyMXQ0Is32b9HvNHvZe20fVluwFCUN8pPW2pPr9D9hVMyhCT3cWzO+All1RxmwsE5NHwhmt9zYH28igW

wDrFsYGPPa3kZek1lIkcfwRlzmL1aB4T3A8M1MYNzh
2+aL7C0llBogJBBKJP4ibzFxd+K1/FCZ23tYJaH43NpaHTWe+zpmWNRI77+XK15kDwSYDT5WSAO4qlvd

f0Dr61+q66brzb7DeJ54S5R1VtQnbkpVu8+wHvRp4hTNIMev8F8G8zaqF4qhX6dgsW3P7YIPVh4ilSIa

DdwuzL8ImdttdlefCcyDraP1qObwECOHUd7qvNrR1M
b9qs/U2+Gm2FRq7pkhfJNU7yRHe+RXBC3KCHeMHpZO+sSx4MJrkfFSG6cDdmz/x2t7XZDb9Bx4Oxdwji

u30+ex0pYFy14mdmX4AocNLcDkUJMFhEQHNrbNLP1JFtPaXDMkcN8v3iO1QboH00oXrBNg5XC2l3vMcF

mu1E7W2n2wUbDniSEWf15sgUhhHjCL7DgW7TbAWfEt
XwvQraNC0ae0O+vASsxEzO0pAqI+M+sKEENkTNWzpo7Acchs/OG+zdD4qv33XsXF/hOicbMFCzoGki/j

2IDac5fZc4hBUgseHFr3TlBoQ8y1oMMsKVLFUxHieulePxsFsDl2BTWUF2RqwVlFA1dbqIiab1FL/9Mr

p62PWH143tFCpmzFL9P1/Qm9/BBvtgfjIwVd8oMElo
ez56sKJjooHgx8w5G9hze2I2H5xs7rVWrpRJSZGMtULPGm0j2OtCXgBHlnRa7+dPwlSFA1J1cyVz2+mt

qmBdJUfYFWm28Az74T8ZegAjXUGppYQi6QJgZwl9LiIayiUVm8PRcUs2l6b809A3fM4NgQrF9emNJ+4+

DD34OY9QRY2BKrnp6Hq6ubT+gJm/+A9hwM0a/Acvb6
rXe45MJDLtqI3UAcL16Eir5+dvFGYhYjRAx6/Rk7PNsY1h7Z8Tytts5wggcCkf/9KQHDkV2Lh3Pq/zBF

YnZDs2u7aMHLh9RS+K3lygBvoM/vg7nBI5trrHXpwp55a9yoshX5Ja68o9GX06LPqh/2SsE1B2YqwXmp

alVckgf0KIymNJfFoi7l/NeVOVSZ/smu+wDUyu5cuC
yEbJeEKrBileMzKMIrrWbM50SrFXJF5b+0YK25EKQZKh2QwFMvO+pz0wID7ifnZ440UPYGDcJwETbsRa

d2cj7cCusho9kquVH6R+ClZUHuQEX6ZGcs7Rqow6kxhuo70wgtE7KOcBu5TxM+no73hjciQF3uMref6u

aE5LS3DB+Bf/2cWoMA9fr+E0Qnwzvy3Iadk7CwvfSQ
4DcIVY0fiK2h//WYd4DQ0fkDCJEo1KkM3dZWoT7VcRVc7ASteOQ0+vQqyjbSQ/PIbgXqWkgH/1R5ELhP

8FkUAa8Qb0RSKz/YLPaHF7Yegs2I2qzp5WO5uqzXgMVKS/BS6m/XoAtFdnZPAUdbOSyGI+fwtWz2v01G

E3F0aV+3Am7KbbCXB5uStPfG6U9wX8UBAiNv7dDYmB
eDpvsCeXgxEtQlFKeiWwGDhzreDS2c/hyQS7QQdFZVmhj3BlRuUsYsjZNxpx3Z/rvzfhuXPO+M+xQFuK

TPLyafoYGvYJnCeoGReyZhEOskyrVJ0nHuiDz856TfXBGeWBMNKrlHXEZERn9wXjJBOqA0FtFezFB9DT

rk/7lLD3qtsLTE6hifzazLP0642V2FTBjlpFB9/tx9
aHiwG3jwsGZQODXSaSjsBtDQQT0WtUA9dcEUENdnPVJazPXYQksm2LsRO4X3Pzqci1AfY+KtYV6+mGc6

tkGO+Te9JjCEnn6eIkt+7lwIKT5wT+rKvQpPH6zC/7+haUyC8K3Pm7C3bQnD6qT2NIYV/yCI8A9Oa3Ov

V6PsH3qPS33+WPNiCK4DXaLODcq0958CWpIymfYKM/
5Tb/0cG3Vsy+F/729vrjjN6U/vS5imC5/H8LYkb/1+VZ4aruqy8f1WskLjY+Wo7d9qPQYxXhKwMb4aWb

i1k/KziItqyac/fUHnBaFcGEXlqtL7Xc3osaoNKtwsqmx3p90GEUqdM+3TbRAmM8pXe0KKT9t3y4uVE1

k8FGOJfnaEBYp52+jc9UaTq3sW/8qS7aI8va8mZk8P
JMSPjTo/zRixa7qnGuPXNl5me3uYG8XDg5TpPIdmx1vM0DWGcoB/xRdEwzp2+s5cC4tt2wUH60sm4r7i

ifjEBEIgnfT4zPbIXX1jD8jXONjk3l3g73wDvit2HBR/Zd0UFjuW+BtjZTAnYfTyjt31fnR+CFeeit8h

BFa0WU6fGxL56H1nCxVkJbX44XkBXbLvI0fSfJpmUX
xK14Vzn8+2YDAaRVE/jre9IHvabOp9lZln/UvDVHZYn0vcunNdGUyUun/aMf3u+ft6kf57ffpa97Ahlz

W28VCBos89YyBMctlKu0nTgaRmdjGLKo0ytSS9Hsv3PoHdswn6QE45mucMRmGzDv8Yuw/ROYpy65o82m

W7iGd0M5NFxZslTPwho/LgsM5GXpSruCOkZ43Bgb9C
P6T1mKsQialtugcGP3i10J2ptqb6mbpSvyOphC0Lv/T1tHfnUDSRnB9bkjo3QCF6UAW7rfsl1vHCz9nT

T7xd9DBigNJACtpm4pbiCyM1AMi9S9wllBN/4IcIUjd8dpH+qdIK4ae/zb55BIjTUp+DDy5cLhqo5Bz0

fpQnQdLiRix1f0YXZI3ds0pE8VESIzBjXMyutN7cum
KMJMokTfEI/rT8AO9LFdPX2xddWBnCr/RoUYbnuwGf3apu+TMjuaY3bgWUN3c+1K2Z2L2iKrx3akCCF/

ihxrEA4JgJQEd1xK9g6DPb+sxgZA1oWfVa/ta8k2VqAnYfMNqnpHZUbC5DfdFfk3OYtTM6l+rJrnFuAE

9/Lnid2Wd/OTYNqNMGwSBvBh5pIsJf7LlEoNa7LsY2
0ir0EIs1HSofnWmu9bT2HiY/QWgb+353qUhWC4DcZAAI5Xe80211ITi8ncPk9vhnD83XPhqqXXX7R1WB

IcQk6y3aebA0+LIStNK8fYqyPuhrGHaP0bO6dqW0E16dWibWYfwVWAAb7q44ID/fWIl18yKqZaqVS4nZ

L8KGiv1iehSdcVNOGLzTatL4utq1orHffP8AyzbKot
h+j0q4zzJiY/Py9zS8VysmXpd8BlT9GUktJbBLi4v3CoH3mH4foAi1cGuLM8XuDsUt+IhtLmEtU85MCI

0ztiIk6nMZKO+QqwB0SUw7WqE8wQ8KnIocWTDWbcm+8PVgWiL22Yl6zY1t4fP57OFIsxvqEr2HwDvmWw

Rsq37aM8TRV3dRVKcOKbjaobRkP6RhYDIzcUDt10+8
uJIPs5Wl/JFQziEdZ/Q2QOpel3KQsDEgFFce8hDP0bU7Au+h8FKj5Hd2bY82wL9jlMiF3v5C2xaOO6PQ

kiklbxxVRZOE3dGLL22IzOmglNwrm6yuvyZXPnhWoRdUs+svb5IzBP0B1RQyFi1bQ7UEBgrbgNmIa3mT

SJc03YVRpTMBm1egTHf+B32sxPvJUeX0pbWRyoqAhC
rDKbCj0JGpB6PZhlPQciZOhzJGJiJyzfp9byLIF2bBTeFRpOGQmbK3/qfui5WnE0R/mZs7poKxileyZN

R5KYzYcW5PzpXx2TwZgt9JYemLZSLk99+NvDMy9LApXTnW2UlXi1fRO4ohtLpJEHTS2U8h/KjaU0HW4h

YaNF0WglLKBqB+EfCCR2Ykfg0qJDIB7g0PtGQiLhg3
2zOOkB44xeHIjV1RUf9/cwxO9Hm975KYGYh5ISJPTIu6noQzsnOPfe1VsZT3iLBEUFanoj8LHzY67KWS

xRMh3WI/IQuoQFx9D/wKT/pUVEqwjMUH1LnvC6Ze9+fODkKfJ8MQbPIMNpWMfI9GaUZV4K0LvV6LfdKL

ts8Tm90YtAgOuf4v5sPpSvPz1SO1F+jnoePw8fsZYN
S6l1alZlUmAxnZTz8sATi8nGx3JiNn3Z9XCvDk9lKQCxAHmtdtSCFhyXviYvW0CtSe572x82qoVFBZ4f

3u8uEqF2+bMNIa/4SFtYZIJsr40V9Ce8X/95yHfokWxwX/uxpUms+ApRqGFqpVVI5omJyZyLzw4YIN81

SqkadQ7tqKI5J/0RzjURRhYEv7PtQatiAaGkiUc7Av
2KfoINr12HE38zckbKrY3PfUS+fgMgqdSsiHqEo7iZnMm4JxI9g56YvmZyjL/Zm2QKo/UW/Gwi3rVfCj

Q1etPtE/RCO1hC3nM8M2+kgeLhHMOPuV8//uPTuJHreHf6WTCgPKJHw/NhbbD7OiwrwP46Y/0uAyA2BM

SpDKC/lLYQc4AnneuzMm6vH5JA2otLxn8iUqodkX8F
51tfZrfNg1pili3XQibPqonzXCxLwtgRkhr1YdH8DqaiHuxQTjO4FlVHMYlI88SiSzw7rh00YSY2Ebqu

o5UW5RLY930VgHH07RRV5mQ2ApoOUxE5nA27KAkJWyaIO6WPCD6myDzycw0KPEVsHh+Ugoz2AdSJTjCZ

cPfDYPamNetgXsj3Fa9N7GMoSrdvfOhxhM+uITj1gl
Ek+GShnymyrnTdM+VYu/wlQ2CSEqTZ5QeQS8YeYUAQGwvXKGvC7RFPoA6qyJagM+k5cxAssjSD3DXH1V

UUkJdFmu5hBqRuHRVc5t+OjF+HeEPJuozxbNrh8n9GdZrzukJh0OF99vKliLR4OblBaO9985qlYPYaza

i0lLqFWk0Hy9Ba/DYoBDLZq1vBC+192qnJ2SWlM6/E
0fsUCdCTa6nW8Qp4wjEKWtVaGHv/ERQz9Gu1EzH8E0i1ijFyaYqbtVEAx9F0PQmo7r1R8bSKAHdJZngn

tDJQtO+xK8Cy2DIiNHqWL7UOCSITluTGMlOM38k1u2GygRMomSUqD5MjpUjOLZk8sV/7SSIOY5qi6o7P

/Ri6+LGQq9MYySWxKTU6Wuf/GX/xg2NP002wGgQzWD
wzumvjwbSfFCsn0jpw3w8nEBm0GDs+cEtf7nKXuewiLuTiaZctHFnfXVWlY9lS2mEfpdq3vtQU/As0Wu

pyALJ/7t1GUuAJ1tzTvLbS5LIMEURKfd9Hxq9aCpfrUnWnw4JWobUTWWMRwxsQGr4yD4qI79OvSsedMY

DMYDL0C4Pmo0/WUmR0UZfnSG/J18g9/+b2+euw/COW
uIpUVF0a1LiLvkpUAZw3DL+J+pJ3GzrC8Ry3dBP/fta8PYECoad+Lr7TeAAZv5GU+2Eim9mij2nBa/Ur

qBhQ1QGOqDxIGtbETm6rS62ya0plhK5j4GXG9plcHF9tHfIgfym6dqR5iLxvkKO/0tW4Wk0pfKDh5dCc

alpFC0Stu5j3WOWtLOgOJAtMjkSDRMu5SxYcVBA0RT
gTKXZR5Box+NU/A1YhPdIJzI6xYuNq41U/AApOtxbEb7qqJZuEkhC7dYx/G5aCY9mxlKAtC/pfki0ixs

qt5lTYOFezD6LBsLlHnE+hahWZCTXcRlhx3Sl3jAsYDSpRlP23ogsVPqz9uV/UhnUST1VzIskUYnqI8p

tlFwHB7p8IPafVUQTZJjQdGfdlfgA48xnjdBfMw+5y
xjmMobpc4i/O/1jNylY/CUiZ/8+9Lc6CwTlA44BnQ+KrWsvFomNz9T1R9eZKuSCaF2vV+jBOMgxNjWm7

A48YMR1Lpgm/fD1W3QxLnWmWqT7IFo55ZeZz6Cesk0R3yn2HJHvYL3B/0LKX1ClfvpR3dwc0vW3I5ZXu

nrvkMR3Ybwq3RD5zdZvXN2RAEG4EO5kq1FE0pLTYws
G/bXHf7vq+xFd8cbH4ZOm2lYkcA2kIEdutKNWfvMYRdrfNf5B726f37/YYRnx6rTpwAPdafGK1bczFy9

oFWkf2kb/hPSEmN7bVqJ5EAG1tFM/Qf8NEqvMBzg2Gw75sxW1Rb4XJ/7h7Ky4QdEXYx3xlv4LkD1KmEw

WyUKZrtP+CPN9n0MDfLrwuN4WwkWJwmwO6oyVcfAEN
GJ7M0zhsZAJK2dO4iPLYg/MD85rTwDxWsVnp9mD7hV78yhz8ao2chhX+U/n+8rRf/O2hEOylaXBJXUh/

L9qf5lNC70kx1TBUHe5UvnHRWJ7uxtUdxZ0a3S1+ji81skZ5flCmWv1Ctq8SUwZfc3e3eDML7n0oTLh+

3Tw57pu+ltd/7CEUfun3D22vJceOEVdaPhUdyEvK/H
VoKJFY9PboaswimwR/2lkZ1ppKW66cad4y1ZkSpaldoMb0Tm1p0Bz4slxn+gHgoYhVaMqlSFWtq3kPSh

0D195KDzLq8xJij11ht/x8PJUGV6bubeIjjhaiue+XkdC6smCLlyWsxp17AUhf3lPalbHLxa3x5VUnfG

aPblCM5bFdz6q3dMOdOKTyMMsrwyMaG0Y5S5oOCVZl
ApUFFRpFQ0ctEzwEozJ033Kien52A7GrF8ld2tIfEJBhv9MiFjm+NGn7bMQYFIsngab70Dl6eF8XOPlu

OV6r9X0l2DTpQ6EK9jzNj5ne2HqfsqxMm37TAREDNwat1SMeVllScZ7piy47FHhEkdO1GKfytU/8OUrz

v2qqSAjn7ZQYujGVX6V+UqwMp7/IpBpiS/T3BAIAgA
xHUHH2fLfchPTM6M9xHL8WTpEnxAFHe72XzzDicyd9S+JxOxGcEgMLrLx/mL5KA7aPSwdhq+DrucIcUK

iJr2nxzowo5yzduoo/W4Di/kkPzNQGi6yOKM5XDlhXtsT5NWNJCALyIY1ifnfjRjNFXF3WkB4KrzhHuy

kfrL4sVXjQRPDozbrecsURq+zyNPo1FaJsgAHOy7YZ
ZwJxDnSCidpkk55Bs/Xm6Kp923TCa6LIK6ezGmru52Rr2UyNo7y3EerCniLOhODMYJviADky5dDTmL23

TNVglnk9nGDD7jtO8CAR2HaADs387ZNFY0HH0dmSvu/BhDtKPl9l29mMCVCA0shjEO8Bw3PbdaitZY9B

Qxj7cKQ1Xuqzrp/UjUwbX03dUbiUsEG44l36jqv1iu
qQ2j5qBEtHcARNNmYlEUG9oCN/Ah6iFec4lvOLh/0FKQ7HvukBF6UuML/828VCk6JV0WMMLI4bmhtJ8C

44L/c1b1IpDq0yHTcxfeOvPkhOLGG5G5HJGiAuMKI6t1d6Iz4QMue6FNyzVBzTrAzKyhVERa1kMQHoAd

yIyNEmgx2dNVsUET8xJ9eyf+b5IN3tUGsldCQYb7H+
qcK3bnq3b8QCZqZ5rU+ZPJWJ1xrwlctYbW0Dutbhtv5KKfvEDYKIz4w8AOiJwSD488rZ1zq1LwwOVC2e

GOwxtbfkxabekV8tz0w5cZ+4DHThAdbxRzlzmtD9fiKvpHcIi8sS9qMfG2JxZrhEyx5OePdnkqemS5d/

67omR27okVlC98Q3f9K3rBxp+PS1osD6uJuW2SrFiQ
/iQO2sJed1kMfsY6vYPXqZD9VGVyCOiJDI2+xZ/bdb3e8XtPMlK+iHn+WMA/8orWqzP+y27GPQmhy4Uc

itObx7G0XozcFgqtFisXi/HFPzTw3PEe912698CZPpF3Yq/ZyV27Gzq6Uv6KJTu50UusK8Uly+pzG+v8

eWHSKPIY4vl9FbbNUTS/yLkIgPqWTQzj8oMnOiu9oI
5hXvcE76m36ziSf3BBRTilPiVyZQi+F2OhRueEwBlLeDCDUQot5rJtJnDG0ySkTEfJeOUEtZiaxok97o

aHZU+w3A0+nNaibfUCr7AbsJJx0vYFNj3y2rSqlW6dDR6mpiXLwU5AkvoijT2KjK6zMrWuXgTQzgGuCb

LlopXa8O0PbJ9xpRXKzxnSmm/pqZ5bMY/I1q17eYd+
db4vVZ/COabgpat2+gjWUM9zpY+MnpsVGBbWzOl+RDMV0R/oshDnJb6Mn3A99ZUsNsZ4Dqb0tO40Ftxs

ATr/nXcbMdWwef2wmuE7CVKvrk7TOW6/gsxkrAAv2ElZmPCqvdq3wgyW1jBX7X0zqZ0q73mPgQn6Heao

3AZxm3V0t0j94cE0FJ2ttThiE5QE2U1ErWBo+c+a4A
fmUvbYz9jljfyUT7Q5EPZLKmO7x64ds3migrl6Jsn0G8DJdqYygTOc2ZvBUv04G+dsUrJKl7680n0A8w

2PENXu68BuG51BL7sCXN8GSDxEp30HO87W8xp4GL/6I9UzdqoctUw1FMGrDPDy/99nqInZwUppIOByYm

pqsfAPjLBlKXOKEPAFkBugBuZGBURaUVIiClSDDZik
+aS+IOdwZ38sp81jG4taC229mAsYmv1OBdlaF2TFbFY6N6aaEqFVJLf7gk5M+0KyIBNJrAzvAeXNn3RP

4v8DgkRmpo8m/8tRFnCtHDMlCEq0O68uEzP0CtclTfZJC15oRLegGsU3x6ZuXfUO8iL2WirRtv6euno4

At4Vkv13Qv2+4BnaZosmadS3YFphPGN+l0jVp+joQO
x6NGjSPZaNwzxTgFPhmJW3VAEAh1LlQsGJQXXzkqoqwHoRXvvTvwFRxIEYUbP5HbmCZxTidsDKByf5VW

TiOjRd5MvDCM2lwZfG6szTA2I0i524PRFhpVN0utG2NUhx3+R6YkLNTEFpda61pPv9cmZ5DmJD8v8xgW

y2hXzCMmcHQEXdfGxnys7iEhw8r22uAI1K43TyS0bL
nx6BB9QWsNtCZ+q22LxwuFI4umTao79ruE6ypVptln5FUQJslAHSWXhDONlSE5Rz7iaS1mM/KslfY+u0

R/WB3WSDZkBtC6FQzdPJkR+2qrUEpXnxTasTQbLZu8uilLjiVaoUn4bb+XNMB/wUmt4OdIPLVbZkeVTL

aqhZQ9YfC34HhNOQoQjMBUxf7+INFvmTafXIg3V/eC
aSMeAGF/08IkHADeldzvC2Xqc1S443ajJRPf/h9vikHDmpY0CeRv3nrxnhL/qmubgC2tMq4hSToYJA6z

2/guPKMgfnprwPzX2MUA993SNjDsGXsfoKJnBWeiic5IGcGsQrE/R4uiemIWQqP7YInE7qFjLBP1Po/1

MEo/ghhQbPjoxotbOG+z1Q6yocxXL7VDMTYH//mDfR
xzT5JC99gqBw+uYxNz/laylcDBJJm4NcWBRn5idVpGN6fRIrxvJ4Hzw0894jNXtA+cbzfsJWz3+nEsUd

wUIgcALK08lPh4S1XLw627K3QmZIMnJwRcGZgKxB5iFusQjRgWemNw/lXabfRBzxt7Pk7LMi4tH14xTH

6wbkibDR0UHw4LPNaFgML39s24O6A2+3nUQq3243Ij
TX+O18DyZNr91B50JYbhEMLigOG8+gsHxJ3tZXceBcQDT6jhdbsis09i47Ml9bHISdFEZ1BnxD3ZUFQd

TBj3vgZYcqHf4Yz5xitheoAA4mrRU7YzOoAfeDtam2hYVR5PhFFHWqWhHHLgsw5nC1RDOzEjEGM6lk6W

PyDAXnwjvMoNP/w1ieBeePwUlFsCOl+/6AWT+BXYUX
krMDpYSnPEGcjrx90+ACgxEoqr/rKtNgSfpCSLMvh9BuR8w51VUSMrZdyK7I/OaY9+B6dU351K6Tvu7b

Zl7JskhPc2guwk4A+fQdfcLev7unRL/z+IzLcJZqpePV066CADdlf9o6TyjxLT1AWrf09lErOiFvOBka

C7nC2t7XK9qy1H2YJXpTxEKk0jqFc/62NHMZ3ZaRCg
NpsB8ia+fyCDAyUSv4eeuyF2+FQYbCOgUKcV19tcoDsj2KosYsxPqd77SBybM+gxKOL981sMzEY3G8fm

OoifFwcP+/dHexlWRJ6vjjalWG98C1IKu/oIVKvILqVdxmQ6RucEIO9d8JLFeGNatEotqzMENYnNQjGZ

DJZdLb+ARYftyp1c7026Ny15d0s6AAYkHFYY+s9uwX
LS9w3td0yq5Qwm0BPKq0zGeY4TbjbnsMzGQqnjkgI+16+4eETHqn00sbhj+l9zVlUbb725NTFxSjHpCR

RtDDEX70ARLPVuaqjCrF2KtlWdiSFHg9QEb/zWWBsnSBVu/BxGLI5jc94u4SF7pKw/3595NlJdU6bXmY

s4RAT1331P2FR8in3p+naXsv2wepvT7zPsNgJkqOw6
cIM4cEdzR6e+yGuEyTGOdpWNR7EWyhvEcuM8O4RdQv+dFR23viDTuaZ4qX7acc8IO20WVV8agq2DM86X

NJhaFIMrE4yy9zJxlmcjSbWj1wx1A2ChS5+UmS0rImb/aG6IllIqKqhnui4NeSdMqei0YYem2K+5BD0J

s9IAT1nExDGzrjOpAfQPmbm8X0vVo0mCEpXh0OCkwu
F+X2kcA9fwN3AsIWEU1a/8oT70kLI6kUGq7BG/gUfnf704Efb/7sIhasOHhE5F+7Zh5ydYFCT6foXu85

N8fJ+nzxHDgSnJgMFzcZlP3G59nc3spuzuxf0hhUYGb0YlfQnEgis++UANmptfX4RD4WkUaipc8xt0Ky

qlW2NB/2cKixlov8BY+0lgiPYROTdPrm700jsFQq50
OsF0RaESv17INfJTIAu35fvZCIxPlFI5tBI1w0Of0Osy/TNEOxFw3yWX1QtogJ1raCXfiNwkixVbQMzT

akDcuGKRV43yqdrqSc//mlxnrzhS2uRi79AXYVdTkKA+mx9GBF22iT4BAMExpXjFbrVpUwoIgUKHrWm1

RVn4AsI9EObaGPVtL7Wa32KsT2cGBp6/Z8izpamvST
0yG6DjsrOuZZe+gNfNu4+x5PvSxm6JpYt6NkUGO/4xO1c6u745Tg9X65Tj35uelRn6YW95vCRmA6qxeE

1ziy8VLxLHTd2ncb0OWDVG8YRAy3luvuhDl9Gm71D+gIIAIvG9wl5TYJTh3aEhkUdujfxAqVKpvW8YIb

cfMIyt9UAMiwJPSphAsVnMSbOaaxdJdEU47oGF438a
7RLRCIkMvraeLS6RT2c4LfXg1Ol0xH9LczvgEWy/DkQ+e0eVQAxYdTxka8FPqJetRNzBRieanr7P8ay3

NerjH2/VS30rjIOYQECQltsni6/5iLvb3+JfgFBs0wotqUHRXWl2sCnuiD/qm96/uGiH1yEaFxw9yB15

cBYAspukdWu0Oea9j9xIRL0bCVbtmN/EPg3a+JScpA
j9BC0o2/FUhTDfaeyB1ze55Qs19fMDUYM/kpRqkLhPgBerEMDzw2PVgC6vXezVWzxNeepTthfX59mU0Q

DgEeTt5ACll8vL7Y/hjCZvGhV4TBcKW3/8faPzRhV8Mq9U0eYQ9rFlYen5aj1/ucjYjSgudwHZPuurAa

brZwMBWWY/Zy3CLoFPHfpLECWCmJh+hM4/tFsR9q9w
vcvAAp1RyFh68oKKWAZelcF3s1lmneyd3Nm76jJTaccP3hDfs8PEclHreELVATgefi1PtYhLlz73Rmkh

iQHLZYYrtgRUGxQeYi7+kerejHUIeWIDftJiKM8Pm9slEqz4hRcP396+GJzjyiMlLV2550ubUI74B9d3

AtrOuf9xI/u+LHOjGNQ3Zq5tkw0ipHtaxi8erKX2E+
BsOszp4D4DtGYybdl+vpwc0a64kLkR8CH4mBfd0ZChT9giNe6R9v+OX/9yBM06KH19YvGhlQ7WFujz0V

HXRZwerDAE9PnFaIiAbSkbueAUQoVgnKG2z3PIXYmDgIfQxWBQ718MPIJ22UrGqTAV1EML4CN5E6MW7M

dcmUMjL6BzH4ZyFFG1+RFfQEijl2w09oRcpGTsCQ9B
44YjCcmHzJIm8iH/U861+W7AZreciHDXER7t/YgOUE4Wxfblx6dv/BIlPTuwOAsQn21hMCWap3awU3s9

8agAaVrSTj7WfY2DhN9FS6k48wGzKQ57O4JkY2xcsXzkS1arh2LJnn+gXdBjbUudniq9JBszFUtC1q84

fROiZw8y8WkUBry1IxCImlzfdvF20b5df+8QtwNzHO
yJMWxgDUso9netYDRJSxsOwnElexyv3Lv1cH1YOjZgE1KGoSfADAs1FMSPUNF4vE5XRPAFMYtaMnH0jo

+UIVn5jISYel/kESVRFQ+ALyaUf6FAPeFYqLDPZAJBk1Z1NUWFPBi5iD0ReJcVly4xqn+wJspnTXGRUh

jRiE3aXl0zHe0Zn1Pl3iYXsqBBLbzVeugPfqzW3Z9z
AEBSX7bqIBKu8AFxsqluyf8X97QhdEB3bWZTxsnAxJbldfkB79kQpaY9bUnmUAs93sGG/cA/3Z4bXPP9

RZsAtrpZewOcjjmunVgKPD/bQ1tPoGYZAJjsCD/7EOOKKrvRbjg2Y5xZ1c7Os/FX2acsgn+DU7Vq21/w

/mQGMCC3YYyEjn4yPzvzr5Wf9XnSGXKMUCgwZDbeHQ
khZt0epODuSAjwmdGk93b3q0wiXpL15+V35bw/as3XRPGpnj2ItnK2UY46RGb/27jA7PCwV6JmyRmQfK

HNNvIoXur1+IYiML6U0dfWKr0BheIiRD94CRoWS33/5e+OF51XRw1ppxU3B644Zvy3EegMiMnTp/00xk

MJ2kImaq2VsNR4/8ZcltKcOf32yFOLIFBPwyjOrRSC
7R2hYVoFJAZ8IDfPkmdaLlwkYUNDBZpra1eu7LkVbz43Mi2Xq0bhnY8Qxec/zMPc+T/sa8BtnGlncc6E

+/bf6ZneupnmAxfnqgGCHp66u4lLG6wKt+z2Jemla1eZVj72b2eY8CAwv/+UX3jAjsYXLExfrfI/C+dy

pjkZdYj+1B1PRSKW+mAxnxrlxTV1pcrqzEWvPH/KfL
FDZygVLQ5wqGU8bCqdzF33ornjfWcvOp3scrn2JlUM7XwSoPTfZakVQ8jQ/WF8WCBiTX+Xsa8X59NqoD

lUMOpGnaCDyqqPlMSinQfnCpLtfp7q/wRddQVzzvOa6UunaB1L9YVRHquR3mYYL1PEOyK9Ex4hQ+koEA

TQt7YLuyLbvH2CYX5MIaTgv52KGfSj0Rv2fjEsDKMO
tj6IADHau5ZfzOP3xT3nFsa8YIFfeSRPnPFHSuycyw/vAS+lD4GtLSyqbxc+6KwKhtVDdfXYclgtHydW

c8Vshs2vAirSHeM07r8JAr+uMZ9+oLwF6BFVyla9t6rDAU/abMeWpz0WR6Lu/zERCnBKcdmY9nwr+daC

ez7Y4EScvn3MNeaul+s+yBR+sfMypIaS8GYjgGYo9H
FZ4W++EiRI7fQI/8w+maAkA5y5rYN2hXRfDS/AOFSupgUM96m2l+ladhFe/ICxpS6n7/8K8uQiops6hN

Vx/x0B6+IBXj117+xDcOp7BnaOicCUJj8hKIxjFs+uVktOoqqLAx1u8fwk0pnwZoqt1KcRSegliHpqAi

PxcZPKSqTBbbrDShfHiedb+BO90C6oYA9wTnhzs/vS
SoFG8Qs3AeY8TH2kfmU4lTvxUmtR6KPBMQBenG6YlZryC1oHI4CcgJOauOI4tLYqka341VjaL3MKCNwe

T8mdh0EJL3zHGP8Ubvr09TQjkmAAa+BPY/5cIaH+jbKvDeDvk9kSMjMhgHZDwst3wVBv3Q7s0url7fHl

gSCi/iDi6kpbxUqSOGOtR7pSaxRdMZg4jFRgqO0Xks
FDgSMddyDqxiydr6hHfQwdTrOim96zUdwklBWSZQpo0sjN+vuO0PelX4BSkdVEaitGOqlQqL8E7Xoj86

HHuIIw7SswSfOX69k+VFZuTUvxGG4e9icMDj+9og+M6M7Aup2w7/PhBvzwjfPatPXh9gTPAuU4jOQkWv

rS/90/CYky7eDQe6jDlT1ltmE934Ge61W4ufdEz+ux
c6KotgHWIuk/tWMms83GQQ6EFRjJGFpjd2Gg0op67Ceb9Q4oRiaFuOXlHvUTpV8iRNSYCAgeRKnfAadh

/nvB/5/jCC2Prcm0cLfZTSpnBnfmJ1MSTT/rdVpQJeppy+Cbf7/xvw3pTbBWuCcmn+9togyMuAqD5Jl8

dj33u/rRldCcBWk3t956PDGGKSOSLD2yAiw6NJTak7
i+N2EJyZSGyiOO9hbI5HHHK2ar3kS+SP23pbRHIR3PQkicz7xU80H+///4HV3/lf92dGmdmtZHBsKj8W

FELtzcVNRa5cojeZ5niq/WzVrLwqoBg9g6K1TIfLBlkK39NudLege4t6dUVgYtUJfa6yVLDBMnMoyohM

OLSlXi/bER2ohOj8pSNMgAiIkWArPJsVU/uEgj+n6C
wUoRQFBPFxc0yo3w0mg2JfTv8aB/yLWFsF4OL8NIVgcs4Ls3wl6sWh473NgNV/nk6O1SAVH+xBGlkY9D

25zIkWkVn2BgH7Sp/mORkhwnlp+MAqRiU6zvOGE0jXKnzeWgffn1ZtM7smhfRN5tnw93CxWBW1x11jE0

ElweOyXF+lgIykn3ym69v38LLp+/IdQT/Pad8iffEs
j+84O0Va492FnV8LJuAk7TTiOM0M8f7J4veKghSrwTRd4N3O6obfkOhltu1bFlCUr0ZoR+Lpoa4p5DKw

KYviIOmQBSoKBBm+ysjRN27+ZjvHWdz6gULdvMAfv4N7n2l7c8raLDnHCWOumWprWWEHB64YAHPy+Ryn

HR8fcQoVGIojaRKkpW6Y09FgpNkAgfNNvI+/KI58mA
2IfWP1ZktangAa0zCdf77jAjvOUm0Xvz2u7vSGulqrm37BSJQ++1XtGlS0hNDN/dvdgXWjaCGOsJmax1

oXGHKOwZL5JwXo6B806YE0uYUgkyVH3DcEpJNjrtzdLKRQNKc5CmswAH3hJOCxo69brJhiCUKsyBle8d

OZidGvVfgDo0kWq9tvCpHlOY2UZ4QdzNwIaVEUU0PR
g4NVoWjRp7oKo2cr8jwgZ3eCH8MIT398++QSKCqKut46atS/jO1muwA+ygviWIYCoNGS2E6yopN/fkQT

ipf1+9/iOom9mxsHlf0X3gucrO63EZa5uz7V2DTHnvEKfF+DV969f3P1Ut2c3c309cADblL0WnvC1WzF

iocooVVuVuJZsXASfM82szP/FqCKXtloZXu0K15Rfq
NXfNxbG9sW8kOw6W4oZxQ17XVi7tLwafj7ukqEIEqb1MuQwCxFDIfSuZK10vrkIKjw1YqWlgHbwr8yl/

g2yzLrPiwdVbM+hbc5voQFeacxDY19fW6bVKHwKswJW0QsdzToRoe2zV7TOboqgp2b88beDff1SjiFVJ

+H91j+JA7EZIcU3iziXtrpwY6LMANOb1n6cbnuYz62
GUr9z2pyl1GMYJekUnPsttlabdSAV8e/NHBtcWOQK8rvR6oMzeKvuN401RpKmD6ALowGSSErMxwaazS/

gOQ5v/TSve9FW3EThiqcPkdVGAplti6zDUobAip4gnSg/kBq0qlfDZWL9oTBQ8EpU2DFB0+5fYctVgB6

Y9wTtpoaltqnUPFc3fe7tkxv9Pr8yTlEcDekIFMgNQ
ULVEB8kjKH2gf93nRLZNZlw5gLXXmq1hAL18UcE0FqMdJEf3j9rUOP+FmKv2+C3MovfFsakAOHY1sYqu

l0E6D2mXuk05NcYfEqvYlC+RQI1obBderBLvmHLO3NDybrJyw/LJ9X5TI97rgEQ8RPUsnQIf2ArZNl0/

/CDQPxnoRMGyMvIhfLiAaAmt5cbIJAQdxwtomOFuwz
V2pecA6A1u+6wKRAKgkr3e2PnY5H/RDzeor7mXB+D4tSsla6i0AVMlopl7zfWzMHW9KMZKedGiCCn0Zq

MYfRse37uCqEP3haZ48dMyZgHkciVk31WigxU3OvRgESKnwGJrdsino4Wp5KzEyGplv7rew0j1iwM55f

gC2i9fV4nT0wZdMCJ4E5OTCy/wT7mbJQJXG8Kzf9mY
hkIRU0bT5pQhIeJi/zwoEie5vZBxvikJ4bWB8qTUrXviWBMljwzNO6MinDMDQ1QLr2OVnUmQu4nNygLO

i8boElWP1DqVoY8BMDwu+4eC196+X3dvbWTZdJXjjqfq0iJglkEE95aAQT8y0i5xffAzT0xY9OBe3HPq

NiDQZbbH/xWjd5axErl1LcUKTWttuv6WzOxrpsAmE4
uNQAhF/7gclxB0JAHSzC1KcNkXYJl4fG0GX7edhA8YYb8s+3IVkpohcvCGOIhc3g2Fal7OX7a86EMIjP

FF9fhEuozEa96FwmN5DsY4nsfaSX4Ze3xCizHGXruqSZHd+iEIiC79ziaZyFn86e+/mlZ5oQ2TXKmrKl

womw5FlgmsPFIxcgZ5qGBvrpL/mpNvj9EN27zwhWqb
0SR4R9S71q3BAjK+2D9swxUDOCM5HMU2tZEIIC867vEcMTfn30piejhzqHr51MNNiIQvrN7iaznkcyk6

UNf4ZJndbgRwIfDkLdTkGGtXDd3owejjDO1krAlZlI3FdNJqLVP0uNWRWrprfHQLuxmHar8YmGgBo2rE

9Am8mxPnWACI+Xcjs3SeQhl52bxzKQh9/n6R49BYk8
QfR5pPxoitkKMl6+NoLZZqNqP/vNnFyIOkFd5I3fqrdnLrDDMohu8Hyt/mTbZ+6N93hlSClV42WEfAgW

iJv9obHGwb8sAaHuMLb1P+w/un3zpOwwMkqvFtmm+xKrFDRpZycP5iwe4xItv3tizCFHFXlD9WgBZ0RK

biHHOhl+fumTf0lWd1gy7dj+8WRhC+Z2RCd4HLcmnc
78lvo1pLEZOARrvFRP0s1OGc7AYPlHQe9NR99Wp3zS70vQu7MesDrp49ojGtBlweZuiUaZPm0T+Kax/j

fwvgGhCbB8axyzucYJTaDVFKsAhEzlbuEpSnDWl8Dg+LdDh5Z/cPmAXxaZkMZAjMEpSJlWq7bDiHzp6w

wJy/l+a4pf8UHjfiWg1uaxJ0SSdqTwZR//9cDQPEhs
vnPb3Z7Dg3MKLC0B3jJPO6vaaX0qmkB7qk7ZeLcbR3MNGIaa/vXplCVfBf0em7a81JVfi6MNpKHN/2WV

5YvV8me15yfRJg0vOgRIQwlYYYITfpAP1C59kEwzHjA5lQpO6ekLtHRGcUrwpvVIiz49kvmbj7jwG3bf

qkVgiy3410YdQE4ZyWK5KpomYA648eUQzP8gIPPf0+
5dH/gAiBjbJ054hYZ9jjV77rNHS57KcjuSJ6IVieeXAZ5p6Qd7Jp++rw/Wku6OvMjZl5RfZ8D4s4eVJ4

jkU708wK1x77SAcia34neNaoNSx8iKu5j+q9Ad2HDkGkktPMimLB+oNmyhQNkq1U5rgV212+K5BeLXXl

s0YXvUJ81dBKHKoAHPPWy3pq9FrIyQFvimP/q5jHRU
l0matS6C0RHN7gdmMiIDQCFJza1VGX80pvQrVpMwiY6KhSKnPO0mjfrS9cMQjiymVxQ1hIhmll4QFA3w

if6vJ/1waYTKt1OwswQSoDo/NENl0H9E79YgnV2zwbFIK+br6iBGYs5KZmMoZ7IZ40dQCNvNTONWv9kM

LkjKoVBu4WvTQPpoCpXgzTls9h3WbEHy208syDbe2g
I/retuZhDuVufF0duMxrbWBB9lXnZW6L8gM9lu6b/GChTN3CMaQTmTgmL502yW5VWA9htr2hxcY5IluA

HnL585L1k+8QrLJ7r3LIL8KVZnlQz2WAxreJSzEzjPuOlE+8G1s8xW6+z0pdD1oQmvwMIcY0us6PD056

X9OpQqyMzQEyWVXyq/1gX8rBrtyn+GQd7vMbEUOxcV
AtuIz9s5kLQIksYsGEjpMwV+2IfMg3O+OPBJb8yy7PgcJJHNBtk5YI2mKWsaZslcBU6u0aq4iwO60e4v

n2/5gNZ3acVBg14qEbtDTdkhst4wzThKOx71+jyDc4p+6C233GAXLU1Vf0KYK3eF8Ym0FPZyc0jYed2S

rP+cOoq9H7JL77/DmFkoZXHIpSUnF62CCdINSlcLXj
fy6TmfHcapqp0PQq244IULvYABeZJ19QGbkqfWg45dT9dHGZqLxNfGQ2gk8GwdOLK750j52jaPnHn0UX

xFP5dnkHgEwKYj94RKXzyr1RENklRnMx1w8GU2wV9xCNb9HnZsRwyRdXTQslnpqgLBGuLO6nBHyKc8h/

PAL2b8H97DtLaIjTAaoyx9Dg5Qa9neLNU6G4pbRVO/
K4/An/xCRDPc5yGCNnQqQq6gSbcRWrMNVJsyKNu/pbU2jppmf9WqQ8irmnJ2brL03BmIGJACl9+oj3Ab

5gHAOMQ3AHarkhLMzH2dyP2fKe0+7RJGWXI+WBze4qRTfKbq+6wscrNNpe3Q3a/CU6JB0tB/JDNKpc0y

ouEQ6iOtJR1N3QpkSLpG+h/pe3PBVoe5/I+MY0K0Xs
Pl6lESZQwtAolIKeGwAiAcU95yIT17UtR4mejvOyPmJ3ay8B3M3RmlpGE+n8YjexIxJdP4boeGkxiKLl

WZUR8dLLZeylHYHtFBjo9CQIVU6YUjqjhU1xVNRY9bjy6iXlch8QY4BRzj7pmVYE10EsYY0hPd83DjY9

HA7FZu+JiIfVNBdkfQU2AOn3VeA9MmuLGMfinD3D8I
te/dniCO+Vs09breBYJR545UBwa+U0g/Y25KNfAWaTUXufcRN4b2aguV9H77xObvub0goMmtsk1QbWOw

8fW0hNVk8PITjzyefWSiwJhc7QzFdcLibvwffRpdNs01MausTWkwnDOizS8n7nuzzjGIPc/2k6pA4BLg

R9tzcb4UBmz4pn9K86ilLOaLzubOESrVHjnv0wRxPN
kv0osziKxvI/FEEF8OKCfDMhw10rb8hiJjitIu0fCWZFmL7XUGGxaUQ8h4hrhdY35Ny1n22SvFJfoUGZ

gz8KniA4kkigP2r5PW+4cUTFZVguTvCKvKVK9hFo4+Yu1ZRU3ZirdDphrqvJG0vl0nZMxGqa40QOv1Og

+jkrIv8pJ8EAoKp91BRgaaH2tz4qsINalDdJXMzHqx
9XqM97dNj8bXJt1iPsEY4B2KhRPdGqKAsJbidVL7W1oA8HF/lj83ms4ImTsx81Lie7WDMAa03wgpyXi5

JksyVRbc9d0ZXy30H6p1XRMoeRgXN4i41oEF7ZshfZNEaYs6mreAhULUermCd8AuuhxsNm9IHNlakrtz

gpnYGp1eWMy5E1dWrTkkwdK84VMIHF8s86jJ867fQs
vZBklRmQk/Podek8udjs+M+kXfwx0drj/yjRkbefmvJ73HfjqTW5+H5fL84hquzB6mpvDNbptF94gv/6

b/tv+2/3j7fK7OoVaMX4luSM4t3O0QIam/1gFq7dIlgL8QbT3FuU85dLf/c3ET629S4ZMXkFrkm8DIC7

xR0hmGMpuyx8fcABeH4JeFBcb9m35E6RbmdictZhh9
x3T4X7y+566geiox2f/I8A0DLyoyh0XGrg1jWelil+X2XLnDNasw23e0bWt2Mz+sh4BvvPokcaWGnA22

68n3Yam/kImdMzMn9IJTMA1u1a44Z0It7Bes4/IgOIZSp++3Nt6qLaFQtn4UjCrSr5DHrkdP61wDQ7+U

k8V9He5221XfkSCoTDY34mp09oMMXF5lGe4L6vxoC+
vKx/RkmDpePpb7PXyKoiX7T3UhIZYk6zBR0zsIkjaM6WX0ajYHWM1DcJ5tHX9ZY6HXzbu/f01Txa7YVJ

M1Q6KKwmw0jkeB4PlNTu2Ljo2GUMS03/Tu1yhKqAG9MoSmrJQkpb5haue8zBw/V74Cme9bgGKKvOp5Mw

bmk6Eklld8PJ7sMPqd92CpvLhVpc8IOppRC6wJUX2f
rbGi2tErYziRcffafuK8/TZNOPDukIae/Fq22uuA1T9U75oDriaWo7iaqJIoGXRn6VUqrmHkvjjP91MR

nqlGVPva1rj1Uk2wRng5WKMZt2+MMbUy3dWnMjRRbGRYtHtvuAykgXcAJkHcxXePwRWGF/yBf2s9emtt

mE9Ggc0pg2GZilWx74vPHnITvHepKYtVVLDU+dqTCv
mk4TMAogX2pKO65RduhG4x1/9tlDY51q9rTsVWryrLi0/DAVWEk0/J9MjhG7wNSTSgFPNeRYScw0c51X

H+Y7li4NpjceJGncW+20JeUcNbAG4/NS9s7pTZ+SC45jqRh1fBmcaq+vawcajbDd/kNDne5UiIwRs9lq

IDrx3gbSQK4WFVvUo0a8kZBI0H5BnGrz5Z2hvXZnJ8
s5ZFEz0gz7DYQfP+6EZAslZBxMnZuGR0IDx7zgdG2/fneL50aF9OoGDNwWKOpUWzHzmD6VIx+4yg1nW9

t2+CRSNBut67ZkVUGWMWLwqe40CdEcTr+vrpRmrZpzrqnezAbCT5VpeqRd4qMIMUL7Js6hbRPRV4Xfrf

hXa0j7bW1ymzCF4IaqGG/+KqhSwc6+kqNw2/1ZDOdv
48GrJ4fjkzbQe5umhu46zodqJMqvy25pk9vSr20q4IjDg9R/4s1Pa//gY0UUj0LnWjtoE5AmA/e81+zN

oB51t4TQazMG9mxdt4GN558Tm2WExOSfzwuAGcND2lHzuDU3VGToe0HCFjJ/pVhwO27fHAJ3ZyKPTqEJ

TaP/BxDqCs9msXCc5gT68wk3j+sIQi3jmyT7vlc1Z9
/GZqK5wUCH86lCumrQ4tMdVYWnVzCZpOu6psuriOne1WpnP189b+NpDZqsCs8dq18E9Uz/xG3vpYS5jy

LU8mXFkjytrnnOq6J6qt2fwyFD34pfbuubypOIcg3+9NN+h1pBXMuNgxBiGElJewCLJFGjaGhS24Q0MG

HROSPNnPI88IU9A5PHMI2cHclbyTg/OYo1sdbKB89+
EyrQ8SRPLq/uT2wWuIlP0AGzE9uTvotmlUMhTVPQ3X2a4w0ETfbx0TeIGZ1Z40sNL87+O7i+DAu727Cl

pU3F9j+KDrTJXBw6zyAl1QhKQnSyEpiIE15YluPv3QDZmkO7IN9MgmrczW6fm1I+1bXjkaDFOnc7OLBL

bToxb5YQkNu87W++aygWBVPYbCNGS+1Vxn7dyb28Uo
QYe766DomB6hMdkXJV2Bj37GRW5XfksHw44SYcUShlez8A/UUOmvcM5pf6Izwrotn64MU8PuXoK8WYne

fDedis0EZb4bdoZXThWRFpkVzdoYpYiFRvULZE7ULLGpwd3Hvj/qnuxwEv4lHQXddxBal0NK6fiMRkTY

uaNHQuG1dy5IT0+oh2QQFSTERKU3uBFJ5U+SmJ9WMD
/jvVINFiK4+XbNfYnJOuw5rXJij5bpveUgeWOlz+jlODR3yFuxSz50+RVwECiVZGab8lgOg/201mpO12

mm7nGbFYHm+8AMC14qlNR4441nDnS26/e8nv7VwAmxZ0S5M0df1NJgyCtyCSDBSn8xgVk3JI7B29buXN

RWxUPBqDq/RzxdxOQcdwgFigALQ8ZeY/ChbFQUVqG6
StzYrXjrAVazZgbiOI50loz+ug9aHiuQyYiyeRKCBzNiaN0c8vLXo/l/z1nwLx0+5WFtgEBrwRxbcqok

rwFhvirDVccVlgTyyvACb0TxmXqGDf90aefpufIFPezOP8Ab67kwWlu+rAAPNlYOaBnZbzMNQb3HbvNQ

qxZV505eivEsVwLhGZex4afLp28stLGnLWIlGk50D+
1yl2ED7XHtI0tdCcWi/zQJ7j48grFQSuIq1AciCWVjC4D6sbkvsagGdFRlqDdoh26IXRwsUy0SoLAHIY

dPG1WDEfTJIwl4D+ah37QW/b52qFxKGE67PBdFlhjC52R2VxiZYAoSGaBwfq1yOB3VexJCZqV9xxWhRG

5zDe6RAOfZ+s6bsZJONArcIiRUqUz6StRcK8t7gn2N
bimbYylljMsQPHscKNPX+T83YCRXatiY3ERfNykrSqxl0Uj9QeKy3a5JGpim8kPjyvNPrUm2RnOhK2Cz

9pCWoY9I9gQpTOcp0dX1Sp1sxg+lWzoMdAFJ16hxGrcuu0/6HrtmeeR9PJeVqWFmPlBqui2haE6yzq8l

wX/2Wn6fkfKs/nzzvo9N3mh6X85GxveYAoXdhigmfS
MCth0EVoM6uyv0RYMDHgAGrR+/jaJyVixqb9rjd9xVMHsYPyH5dwe8UWeykBEmroD6ryRH0S1T5Ah0C4

Ka32CP4Yf3buk6z4WphhpozStX8HTivGkWx6b0+TjgStk5nAE2KT7lJ/+TLhWRgVNbX2F/maygq57KXm

J4tpwZiNQ1++Hky07jbb5gzxbsHJSpCC6d8aLD8Yp1
S038S5rr6D1D1mWPucbMrK67NL2j+SwBJ6OC3jVgmPTYXF5Ge6lldmToeQHk+FKYaYjgF5UI8PkZjG0w

lP7f+d3dq60N1mM2NVPrVnwXeALC/Ttzds+6S6C/HwFpuszvSxqi9/zsg1/ENZrTy4wRhAdUviIO7wET

R3f+pPWJaH9RJpHCaZIqfIeOh1+nFN50ErlduEreaB
aBjquZNuHfVWV4QY6hefRvI5kcz5DkIYU8AWcW9ZdnhGDbe+bfR/ciA2KSzVd8PB3fZaY7C2/eRxZV38

K/1vqzA2HyjQGdAyrxcFH+l+LKQC/lOSbJqwLNtST2/MI2PfvCyi01UEGhgSv00HbT3lvxV/z6Tsv+ni

WyTpOaOwRTXvPdBD1qJLdZ3OYY26TD8wgDdcLWQg1e
d8WHe/8i6OBAI3k1kovbsMvjeeTpgxJ0SumhumIfWYNIEpyrLdPFZfZuxoIGAY4D1Pa1sDFAA9brek3+

ZeS2OgzRblZ0TFZBGYxZaVR422EOCf08Ot+JdZ0rRsPX3KrvuIgTwHyQ9aSCS6BJpV7ct7vuqQSEETJR

eBv27E6y/hZyXASSE/eW1zwKJWTshX5RPazPsmFiE1
rfZlQ1S43EaKQJ/mo4OB4wQfLSZnVXSGweeMohDHJyHxAFefxcjtXuiwS89md4AaOxnC+Eef4WM+R98I

WcI8fIDR/fOodJLcOhlM4XFkNq54fPbZ7IIcNNAi6WPYQY756OltNwI6S7TLNW0Tjfm5f695I616P+ae

LxwD2iJd2Hv5zRz3NCoDe9DNfx22Ly/q4vNqbO5Txt
cZWJ+e4HL7dZTXJh0+isd/3t0bvSzK8bNujPOX8172FZKffGp1lF59lTRxaR+lzgEgucKkzW0y8PEBja

csquT1KG4w3nt6DItfegx400mPQcmNRg9RwSkHYq1aR2scIoIlqN3rrdxwbIOisldcm3WN8hLCUhyQP/

W99ziJoaxtSy9sAaH3nbPsgZwq2NcH1hTCroNMvTgh
VtO42tW7vKhd1Zt87kLRjzdvXlv/oel3nsNfkkl680T87DeVGON4nD3rccwYB8mgGbPLpRSl6hRNzPZj

6YtEYtYg7JGDRsGz2kYAZTOEdK+jose guadalupe+6QMaLzuMUhCE7aVjTfopu+zzB6rOdGoAxZtu7zK4rhuDrIyl2m

MJJ6l4ooHhSPj5dRkl83eKhVKPJjd+/dT0aCV2SDES
T1mltEkbghYZttfxDb9gjZiY0QmZnAtts3R1equy3hkiD/fiBNru7xx4YsZyMzlLC0PqP2LNGX314KKx

UE4ExYGauP7bxYlB8hMoFsKSJm4ub58UDswP4N2Oo0ll50ffKVylEh+mXOVe/bbpYZpcIh1qLRGwKsg1

1SwlbXaSznpWUHsPxU4XFx851aBlLdzacf6lK1exEZ
ttdnNGlbE5dtD6nPKhcHrlROaooWS1rBSOpc4x0L7Kq4Ptol1ro6KwjgCvnF5vWWWExqBhUvbDAMD4Mi

19N2nJPNV2Nwr1CBO6DnTg2eNckwtL5+GnJKNVutzaC3syrT1A8TsyaayuvNQ9TNwOBsPeYFK8Kq9vBU

y1TdWJrLPaQam7/Muy7yse+a1AxpTMwe967mS2l1RQ
iTv9p+KXHrqMdl5UN+xmgkVmppGY/KNNbfjAQE7t8fP5jb+DizO/XrckHzV+Ty6/zOJ1FQi+nn3VBH/E

2qPGjy+M3d2Nxirlg15U3+JM+24S7sDEOSC4lyrYJ7Xe0ypx9r1qvssj/C7oda3uDW5Td/AS16cbv0uZ

q/b4TsE7ToBqqjIAXi+B/C8ACgF9aK5+rzSdBjuzx0
yrlFfnl0LQTcZy4i89mMelRb8w7URhYl9ZZdMmeugU1TWK6C0oUvuArJRXlx6b8tcX4hOK/l3mjIUMcn

uM1WyZB18i0RshfuRVCS/jYVzqz5aShnFwP25SX019OdSLPdukvjklfAi5TuCvJ0rfpXACzj7h1W6qlQ

Y9tfDboWLBz0V2XFOnT5cNRjx7Bpbc60fOOfDzUOzm
77v1tqoAuENznkEn3IA8vYdmaKRSClcF/b/TzSAPE8ZCfu0dfQeM+UKsJRf54E2ulgGLVDxsb9nYrPDF

jo95maalQtWIGPr5KAJ3w+C1/Hhnt5NaXOuThF/5ZHh90BX8dNnAK/OQ/1y+Ee4NiyA67SAKL7w8gjZM

K1qsUk1XnFNb2wn1r03yr5349utUQV4v37r52nHw70
qap3wNYsZemxxwpKF4MDm12FCH+jLrsc7tWu5+VmpzckSckc3uyK6GXPXdhMaFers9mDJACFF0Oz6J6H

nJlwJmsoq8cdOjXokj85IQPXzT/l/aDSEytbjjFlq9yikS5Y+mE5e5sPmHhj8PURpgVWtHQHFusoOEYJ

AA/C8u8uZ6kAVOu7FMyEd71Sx9r2RFKVebOdLkcLle
WRRiP52ep8ldeD0gibtsn/G7HH8aJT6F8t9WmiKmVDSS8bGfguvb5lVONseZgTYJB4xGxo/3Ti/zpdIH

1Z9FhT0hCCHceZpNiGV1fsHFoIL/5lyT5nS/1Quml3DNqky94+jR94uBLjIOe0bmEf5AOv3S01YayvjQ

32kwt1Fch8bOxcq9VUB//SRF/7NLsSZyQRhaN23Vcs
REmvcm2AqyPWvgedFJIka9YpPNH33v5W8uawZ+d27IOqLRdNZQSTU7cXWRIJqE7d2yWkaHbKthPuyEO6

e88YHA/jZaK97589Fs8IKM/bHLTXYaTENKiKvXI7EYVr7iSinMpS3trFWyXeDaQX2KY7l78oQPLIJwN9

vJPgIfpPhn9SqtVhbUnvAORVF+fxxb/Ph74ptWTKw0
t9PaF1WdR7rvhi3PUFXTn+vNDhEEJyf/kWVbGwo7zZuFvqeLx6qL1RsR/YI4B/PNRCArgGl9DL+XFzVW

Gr7BQZSAFOIJalQas3PQxE6OtBGRnhGTuxhlzzuJUD8bX1o2WME29JW5EiBCT8pN3z6BKvDXO/d81sNp

oBWL0+kkyKN9XALEQHUV3xnfXwo2w5muno/AWm2F+g
axSmZljAmCvWGnSQeR9ClT11eTFb2PC2Omsh79i9+VXCIyvXjnT8E+2zxw1SuWzHfnK9uQafqsbF+BjR

/fNySQWq04+biPhK/vBXkvRvm8tpXoDSGPqoBTfpoZUfH4+d+fuyZ54s4Hh+lGo2D45J9Oj/nHOOIugf

/ILVGJx1QxT+F2KbLSsZsMLl3wRf3wfrTs05zdMGbO
YmWLIOXFPRFhNZBOa7V3d2EYNLAGbWOHEms1GBJI6Tx18xwNENHn/r2gNWZC8326+m94j2dkuOJyW8yK

bQx4UgniAJVODCZNZAcp8sMgqIbhug/KxFOP1fL2y8VJ4sBJ6zjamZATyDhc69C9sY/XIyQTnjA++Xpp

QlGZ4aMp0PbNmIIMQLpB9aj831n0lGYB2dBZ6P/8cD
ix/z09KnAkdlyp3pasAp1irem/wIPZ+0IvwYzrO6RBW0sf+C2CYVfIEMkqS3f6J+lR84KaCnUH7QtaG+

VmWD8d0TN5OeX6591cyZA81XgcCAIZMJE0cAwo989P3+W8CfmS+6d948rE0vgBzJu66gnsFlhkJwKE3u

IGGb0Tga1WiRlsnqSpf0AreltvhPzu04GIY5Oj5SZP
9qqZ7C0VCdpgwMsT5wpoNb1qfcIrWx0TipRHwugj8/fLk3FdtoC2tbFz/D7gilyKmFjy8LVpcf44z9u5

FzA9rBbm8ZyjkyVJWzGG6LqUYial1TinWupRukAOGjYwDOiZY3Z9ECJjQtyjrXxxEbJcXZb2H0h+S0I3

aawM2HwyWfvsIrZi/q/WskvBrdyNW6Q4tsGsJ/nvOJ
ryrIWjyU15LP5fkrIP9Bpd8GKzXgWjQ+vzLwd9t+Mwlh2z7jdszJ9z+rdTRik4Dc80O6xSAI/yTWa6w/

4a7tt+WjSce3p/+r9DeYA7rXj88KN7Nrr4+cBdP8QsPj5XTqa91h8MGMVxbNOI/9Z6f1qMQRLm3AbwBp

Unq5xKDYGzRmvuaUFn2BAGNRyVXjeY6bznGe/2mMC2
G6iqWjVRCDRyFla3TQQKMwAOVjqCCp+Dp2j4pzztjouOkZ9gYh/Da5nhe4PmsR5xvj8MUtzfaJqBEh8r

1qKrY0Q12Bt/jC4nJgFnowlz8+z6AkSOntSADVh0wpQFo4GmRzN/XMIOC4E584xJ8fup9C36RCpq2IBg

OMzTk0T9o2PAcq7TDK20qT0xSHsOPnT5cHV1IrxvaE
zQDfa/p41cvr9u/XdruVepg5y6ARK7nfvOqlTcExkd+tpm8HRMX3mzniHRGXBYBmEY/MBjLnznzgTB5e

tgsjyVZ3YXsUZnB412CL/QApIvsdCGeNNs3uP9zd01ocIlYU3P2pdjMbLbLD1e1WsSej/CJC2eFRS073

6Tx7A3VvdDVLGSPY43NBdM2iPrfajttthEWdaBO60K
TvoC6oRIjEtbtqEmrw03u00WFEl8/wEVyFbW3606kSwCxwGx2GryooezAgyTajtNmcA6REH09RiHiIei

xjGr5UfIdNLpsJiE+u9JdCE1Wxy5yAQw+Z/5JQB43XcX/4gCGN2YFibtbLMS91qsyX3nQ2yZHy25W68N

mUz0DkPrG+Iw/eH9awAGOW1PAZqHryp+8ij+JFTVOE
9LwMwuqm4ulemxbs26Tu3tOJwd8coijUMBCOnXUk38Vxj8Eh2zmPAQJl94YiqQ8ex8wupXK2KYHMT19D

8kHcmW4ezvOn4VpCnldW7GUjNBmm7t+1qBc1calxNU4W2Nl52OGxbbeI8vc3WtariQXn6BRnAAhk4NmM

E6mp72KnsttH4NRm35UpmmfTeRAWbRl4jKE6Z9ovAd
Hh5djAEJ/kdet9uH7NPmCu/Z5Ch799HCfCjNvhnUbE4rQ0ijEXrMCa6DObstOM4COSB3wCcYeaSElXWh

3b3j7eS8WBeOO9aCTc2chL/m8x7t6dlFR9cpB7xMR3K+nMf83KfeARn7m5jctTb7rxc2CDbrW0kG5Spa

GAKKbA0IEJF6hBnTmO1rw9n6Md2RzUSIQZe6yQcRKL
GTT7YbbxgRj2lcSKxya8peOKTf+x0xJthPznbVDLM6+EFkXVbhJQWCeDTZElZ6wnJXTLX984HKuPm/1U

IgVdenEc1bX2KAyJmW2QfgKd4SNpeRMsRlxLZmas4i8iTrJjSwIuFJGcq2qnSMC7PaSzs/ZFoVxkjtEL

I11R3luKi5nnuYYr0AuiEooB780+EQIg/oSlrGv2XQ
nw4anpU+DcJYSLPs3fXrXHzGV/kmAGsWldJbjui2kO1GEiSbSfEJVSL0g4pOJaMr3MzfXPSDE5YqsU+X

PqowrKjQDCifDMgz5x93nl5ygtzs41UzC93crZ85EU510QYNuSCRP7qy887J6UBbZ5m4GFT7ImG5jM/y

FexBhARm/00AqF8/jKkMG6Rp0yhfc6KkPFuClgHEYE
g/DnM1ZhXPTmjhAF1gI9SN/r9gE23HundCNp+a9m5J+ynNhoa0bzWybSA7az5Tvv+E42LJ4/X8caNjTc

QrYNA+QU8jRLYxi6eysEwE4wS8mvY+ko15p90LxnHlYSoI74/sve1WvdGPop2ImPgQJ7Gl4B25V32kGw

fVdKp5E3u8K8KS8ceGpdob9TZAhEeAOGU5Lcv6TMAt
fLLc2JKiFpBiBmZjMG6B4262GoIcN80fW7vtwGPlpr6s2fNz5+iBdIIXTMaQL+0UJ7S9Ol4Wp9OxX+er

wYA4SbhAU8P7dkwlcpjYGvsiwf+i1PBmYjUykNHhZ0K59RxiB2QBBAB3B+Yw9ccBsUSUXA5TqwvJl+U0

ZJ12oAkJA6xo/ZZgXoyaUBPmTXdh/1qIHUkwyyyPKU
QOJIg5vuDrlcCEYlnCLoYtnZgWJ01htygnfuZYIpdry44K6OiOvgaDp2+O8P80A/cR3qx5cuhkOm9V89

BeKboFbKpQiVA3pFp8OkrZqjKQewk+6CbSchBvA+NMsFwQxertnWLtlMjtgD8xkJOaXeyneySdcCIl3p

RH5ByE3u1Z6pS7Yxja1Z4qobYz1Bs6n6iwhsv/86aj
JnzGj/ZkzazrjOMPac+qmRSmZEshFgN0ni1DYtvsKvD9+Sqv8ZT6V5YuCMkQWcSQw0qUh/JhsRylVf5X

sC+wYP0FY0+1C0v6gqEZJvpN89W3/691ZTVY3M/nrGdyLxj+oFYF4OZst0dNZzyABPkbaokfPTtyQk+l

bWB9RfQSnRI4IUjayYcZnXL/PonfzKAqOzIQCApYV5
vPp09zXqcm30zDpnU0IueebelSNa6F/syIQ+fRpThfUJ2nUVGOfJUSarT4mpBhMtiMCBoHLDkKR+rLGQ

i9Rq3hQFujZ7C1rvJt+dhJ+FrWJKRGB2WTa7A8Yc4pk8GOVvTL32QSBMnMsv1wG88sUp+KgxZVaXm+UT

lvdBtx+lqIc3pum3vDrDcwGM2OAbxswrnaC1uTN/lP
12mVi9tC0xC/2/whAt6CIVqZmUd/EKRVlCkiIc9+ny59U0ej5tiWwcUYnmS3KnavSgv0U5rsDLSitaHL

VEo5xd2XCfI1zgH2CYhsgVrqrIN97lWbQkbNNveYpPNbnMLQekw9c7AJzzd0L+fh4D97UKRQosCgnn3B

CFwvj53UsSXtFkUnRtQ7orJjOzt/E2s6blAM7SXpC0
qOxgkUOMmVw/rJQpzpZVWmYp+J+GyFbgHfm+5ke8V/L6YJ7nU06nqB2lb2CJte6EyiwjEFtY0I/9vdun

83nUeAvuuBNFqU+vIb0fH9vahMg2QuE5D0XFIWYYoUfN5ERrxxuEiesGcOQgi12WlVVmszIGQFbvIU/3

yDRR+E9Z4ERsvUt4JKzNLR61vj5aNv7XLofstrhG5L
kIaiB7RJ4RZz7SKR32J3i4N+0EZu+Wh9h8cSrfgulOTirbJ3WFMG7o8v0Q0UjH3pRmTcvKK/I04nsyAv

ob/iKGMlfpnSi/HLpzxux3tv0b7zguxr6eI47aj8wZL8461jXoZhMsFuqsTi2mi5vGZNJuw/3WjdfnJz

JxbySC8AvdCJcMHpCh8n5ijJBND0nZlsya1dOu5HqO
K2I60GnIPBizOguJ2rOC9ICh9/wSgB+nS9wEo96lhnsQUIqSRmhwrwY5Y8J//jHrKlid9thtoCkGY+w0

MN6bZUuiNLgKihYmrH1R7jk/ttdkIDJNldod2lxjSIn65RjJMhKyKtUrEgtAdAYwI5a0g59LoxZsYovV

RnppSLjaURomS0VodzUnYFupTaSsQ6HdSk7ee2hP/0
dYirpPxznZsgofFpdjiPLOQqznZLUcZfaK2kt2l8dfs1JkXJ2Nt3vCaqBfd1tO/n3pVvbRmwr6NSJWbs

YSCXQrdqFkwRYTMCqz0coY03U3/8gqVCqnX+9OGwcB1rvic3yOWSGVzzigX9Liuw4dQSP9aaqXm4cNdb

aU337diexeDZ0f3EOPZaRxjbA8Fn8eXqr8ZF5Ceu5B
Yp1veV5n8iL5H+/PKYOCwfcxKIoptlHHWEvTS3nYkA2i62GwIWS7LHKn2lLGin/64m68/kYuf/DD1JFN

XX8KK3m01S14mgCIHiyyNw5wf6q4YS/uhB/R9mY+Igy/EsbQTJWZl4O4XtSaHfVg9Uqhyz1v3CYTFU1+

fh0N6hpcSY6L7thhxWY/h+sfFvOTjcq+yWpJViuaX2
KvguNyDJRs/gxhWwq8towGOJd2ffF//bw7P/w5n4VfuSH508X12SXd3sGVfBuP0s7YnuyLJ4zLB06P3k

SOxm1m460PA4rutKN1Ih52/BCyg536VQUsaSHccUQkFAHfCBZOF3n5xxHMHpnBishQedj5iyndCSM3e/

ZOIGVhSiXXdhKtY9t9Di+K66AkarpwhL8FnMiSd3+v
Q123oYr+fGsjGZ47y12Z6QfRVD6tBJ+PtiZKNdoVDx4AASuB56BrcYyNJVb2Uf/Sc4ZYFKGjDQz70eD3

rodZX4nOjOuXNuQ5hJteeAjIaoUw424WrH+cTpGSPWkca1sRFCv+D1J3wOg1lRvWutdxuuDlyWAdG2Cs

hsefncjljvVrI9WjfxNcgWTF6Gx7FFcdkMHOP7Ps3G
FGmf/vCnr0GHACq6aS0Pds4l8LWB4FNIJlVayN4opIkyjhHRICFIPtzV/ja0mllNvXt9OXjCodElGHTM

aLu5I2sfnLtiiN+IyGnnJjjk4cDs4k2vuAd6s1BiN7Wa5enZS4bwVltaxmiKoYBVsPmUdxBWTspDohnU

35HQQ6kauXjNqZgi6H9Vo89QKUTP1d4U3QsJKTBTtw
p5M+bTihsOqCvqNjMi6vNlQy1h2tvXw5EIZc5NdukD93mctgeV1kho/bLwew1+u5eszWxBH2VM8LqC+Q

bipEo0d3die82zJ/ASv4/0A7r9vuSpQ0laiRZzFnjPndRxhiPYpchtc/0c1+kLN3v7kz5TCPrtgvkhhC

E/DbMa386zIzijGLK0+2250K9HegOnfpTg9Vzsek5K
4zGmACcNsDm59o9OsoDSlwxkV+3QOCHgIZsrYvEMPTpDqzaDDIGq/i9g6KE81UCpraGE8+H4fU7twZNi

ZJbNklwOXiYd7P4KqaesHaZ8jA26knLevY1KJ/3TEehKc30YrW2/DPdrGvU6R+QeFbU93oSS6fut5/Gt

f8I1Yr8azNv5C/3M+G1aHXDkdcdNYzD3dv9/Aj5MEr
1chKB2mrK8hvCmiZOWZAGwkWl8qYnJWrTLBheSs2bszyuZnFeSDJoClt+hxPth1NffXZoK3cfZI7o/OI

qJExiRrnYFLYwF5ue1V2OBqLwqFgXH45XMvEg/Oc5pIMpS2liI5cBjov47jYmp/UWGiZ/9KuT73UYi9p

L8/xB/q8eymMWaH2oy/A3Av8EVPkPOmCgLEQCrT51/
9dIT6lIsB732vlNxfGxmvgNrpLq+idFFpiRuhNXRiXgWE1lsBb87q1eeqKNDNNzfeqtmUVlQy1uiWI9F

SEmh0QD5E2vCMqxRIThQMV2CkiFSgAbaujtTb1BwlghpGM8eYf0Vwa8eRNbaxaooJ+6UJlW/A28ryz5d

FtiSV/tu4DvDog2ilvZSmcW5iSSCFfNo+bEfr0iplj
OSeacPjk383R8GUVmnrSfC8fmIPKcz8Yrhq61Jbq7LTxdKGAXVS/NOpDj50Mdo1QhrkYiL6AiECgTJ5g

j0GAmklR4h4wUdannkmuH40KNPQQL8Q9Lk6bwnBiH1vejn/4Lkyscomd4tiwdyllgx/JzgfxBfyJzZ6N

ui0n0Q/cIQSuaBLqFI5qi2WhT4g0c+N1Rz6la2vInp
yjki7culAsFQGgaW7Ad0ej4iIfFN/xPDleieptTnRewvinDpCwFmpZfzC+330MLdtTpe79OpnwJ7mO8K

Lr+bhQsr6FN7htyjoraYa5neR9Orp25aQ01E3WSMsXg7Q4Ily73J4yfchDQM9cJE374m3os2YI50tbdJ

50d0W0g+Wkl8e93VH1HC+reeGWWJjDxNQr5I/jNZfD
b/AvqjzJ2gM/XalUTn+surinder+suW9cZYqDqyWk3jefgo7hgh+bBtvho+u7F2t/QnlbJjU+TO3q/t96y707

Zfp8U/TbNjRDHuYUmTPmffAXMGyHJXsxnC6rNsKlks8q/BdWjXOOzSe14+0GqrupxbdjWUG2rf0xRQjx

Ig1y3gsv7D1R9f3LgGG3xfSjzWRGo39XoaqxA0z88o
DF2Egjtr51wyrvz5K0XPCWmIxr/JQNOwFpHsR1/xqOXDp8ZWjNv4EZkkuvcewHBOUeOdxEDXH8Rvc4oa

IgC/YfPOKil1j9Zei8mhMepPD1fIwqXqvd8UddSqatQ2VxfsLPyntfZdocR0hU3HxLDLVzBAfn/PPxcj

7yFdv/O9cjGlaW2d/r8BQDSnFybyUKqHtEzDrFePGZ
P70V0puh0bi/krj6hhTaPHOubcA99mtr9O1CrGmCBl8Q6rGzQEipg+bb3nrmDC+jODpK70szqs2sy/np

mYC9Bl3SeMdUhINQjzaPdbyaS0+9EVuMRu4hq280XmeNckbWXeWpW3ZpTIDR9E7/yjEuzBRWvW3rWCoP

WeeHTYF/zv+rJNkhmfXL6Cr+Md7vRhWBiGQLrIB5iQ
0oEIYBRpTszLxZW72jvNplIK8bsyKnro/OnEyoSJZYAweHBMa92TAXm61Tq0HPBseGugAJnJlW81Sd7P

UxRe3NbhsjxXQBpjXkMKd8RfyNHqBXToSVbKmCiY35al2/J3nxGkimorNyNPxJ4oDg69zH92rHIU3zIC

lZqCdj+AWKvX97BS8I/ZVNgEoefcRYeCzsGJO9nDUd
AawRCH+nNpDhbrJthlvrRm/w6JkDD5l2jszbs9rgVNAXPaxq+hEX3ZCcOnyqldb2NH0AOCjxrJALVnAL

UVUTrAfAWwmFi1ylid8/75n1FPH6/MVH7UremPLXlA/c5z9pgWtkcBjjFCoRt/yvkX4FD7SF0uJdcgGU

OLcYaapgFcrZaqjTPXkd+Lf6lt6vfM2hPH7pq+CsQH
pglyOJd2vlFpI8DSdpTumQEz7dwfyFdilgsqMLcx3o4k3LoebWTjQyMd3GaXn2mioV8fZ2GP778QWjS5

VpTNALiLP6ovH+GmTWRdyTTOF2CsFO00AQyWFsyU5+w3dtFIrgenoKoIth9tIIhBvaSqvfKWRg7UAq2Y

SBQ4vjC+G7UkN7QjOf5+zSiJj2EDT3m+mjmRspRxjv
JhBVHxKPTYQ6vCxb/T3r79H3peQy25sYPS5p+NXqBT4OHdOS2ZLVcTe4lBLbaeMJJDEtEcXSUZBRyXNy

+VPzPMLfV91toD0TIYjhc7Ossr17zYllCi11XGEWbvoOq7gonIpWfM4UQfX5X2EhOfJjGVRg003qOWGj

PQjN6Jbs77esnD/9kcDKeRQbCGZea8iDsX9XenlJi6
W3oW2oWgo6X0fkMQVSu8hKHnqESwsXChLABJaajzUpxU8iHOmmk48OdS6ntvXfxsygOwqVT7aBTHSinI

guwnbQnHlCfHar46GKa69pW22nGGduEdAomDAExJ+EtC5ZikOj953A/7H06jtPINpHAWEbw/6RZoCUWg

ssopvjS4gkIjrCxwU3cMOWI74l2RVDpm6UnwImMq0I
Q49bleaJ9ZW+punqEV9a/wq6mO9E+IO8CbS1WTxImE3wc+z6w+Kn3+p6PJ9EEW6vSUXuUVi7qlqDyA/U

/MRePdur59bJ0uhS64cgrF78Nngfee4pY7WKXddc3IiVm8qvAO4AhdSvwlckFL2Xtd62ebAUgMmxgQV4

QtjUKsPji2XVjNnBnEXFnHhOsgNWDmYgk1MAJUjULm
S1hcRT5peeXNoAURFeWNUmwbV6SxDrqCeOkC59qcUdbGNQ22o7A3UZy7AkaQgFex525nWkjjPRS+8uXP

HTZzqa2yg8f8n6aZ8QtOuYsFo8Bl2ZDuMp+DRv2fb+8LyZLssPLBvj/Ymc7Fz7KRPTh1qgD5GKyk4Zq3

gpSEcagQBCU4E0dZgR0SMgeFJp1G1FUsSyuTESmhgD
IPLnHKjKSgmysOY73Eg47oTqfySHuK7DoEOlpXcV/4Qm05C0P2F1n3jyj/uy7TGJbvNF3niLwOVWVZZS

0UMhD80ngB+RqIxtr7EJT4f0rUd4RCf5hPjws/l+7sTYgQCnGfh9RgC3MSEs6ugLElQZnvutBEZNf31N

ntU3vgyJmo826nLDWsQjpN07Og+nlSWxssMOP4yf4n
yArVXN0xEGrSBJDmeMr2mvjhQpWppM9EVwT425YX3X8a2AMrQoS4UiwffStTMJ1nTotb3ZSZoe6qYFio

Y6GsQNTbFo1Yp5RN+C8lH0JP87WciCFEYQe+Cp/Obm2G/o2V2Ea3/rQ/Lw5xtL7qVv4O6UQ0EDJj+qZB

Ib9Q3j5L+LABjBR3ndfUvkFQr2CtUJaYp/jPAuORTr
DQycuFrUlA7q3JsUmQW62M9Jr/EDZKHiTMibRi7lsTz1cUl8KDw/bNaVG2/OKj2we7QsO17ewX3kk997

T2SYag33IHcUmcbuKdj2na07EjkQ9y90uItF4+pHfecuwTbU8ge62ztpj5SuL+cFogSjxHWTeVQXrR75

vAYJQ0yFWoV9V3Vfd2mSpcAVMon+829dUsqf26eryn
9u+UCadC/js5HLMgNzud1R68Ed/+X17OONKzNUuul2tOCDOvCLz9S6pJvQr87m1V0g14yDhvsbp2lhO8

VlXkl1KocphPZod1AbrcblexPCMHlxHUoDTy9O9+bu5Ptw9TzqILQ0JyZ3tojhfRTT+PTg07eKgwKUfg

eRmOME7zKiXfCEwrgYRKL2snG44lTfVl/OkQP2gwST
llm4NA8r9S0QIQ9/LxrWsJfcPSDeAS51gzdyD8ii2TBuQAcWeIlWoR7X5eZ1WJoUyvFZ5iz3mwWZ4nG/

KMTVrZCEdZ3yPOC00rkh+lrtiFu7O0T/VtMCVjst3PBOwB5rwR6vw3X/lTLdYRO2Ohn1alWXwsvoWCjk

piSxsLnuekA49HgyJjWRVZ9TduyL4paiDtlx/+R49S
x91skYYijyndTnau7sLzfAKrLm6tyhA3PAIuQwZ1UnAK2VZyExlizljg5ltu+e5gUAT3ewN2Pqf1Edy9

MeRFrx6tbGV5kM5+P/h928ETnnm7GBX5LFCUWVHOLxGFl7eRoVlIFxuHRS5lHKmv54UsWTGbfuonOoYB

Vir4hcBUufRgrIvAiWzlL0YsJyLmpeplldjy4x++ve
U+fsc/lMi0Rxh+6jDy1vx7tVCRrVrHuDTq/VH47ujWu9PLWGcYPa3yfsXJyR4S7uDEzH2Ri2xB8uUU3p

tzCCBCQJD/8yb5jLEqRd/dTk8hwcHEouzWqIkNSEgJe3bCVMJZTTczI/aTnpo2gIwRSaveh8iHAWXB5/

AUpB+ZAziRnOtLrVTQidvMbdowUKw4lB4V1m83+xqj
uOflqf38nTu11IvjexRornBt3yFilLYexTj30X3nf82cfZe4wlF5e35aioLMn3OLwOPd/pfp/yXGvwrq

zMsMi9u0V3KLGsUGSk+PnqJoVNKHYZgsKG49hGeyMnTbhmByh9Eo6NskQoI7pzlAMtvms11/BuFshNIQ

iBQYlf8n9zHTSgHFI6ZO/1S8/97YZq12Q1a4qwS3z5
93byo3jUZhXaGrkZ6FNn8sSkN5Wt/zAutQQl3HslIyRGZnZ2y1CB3TGtwqgXvwcdJGeolYYEUfGHfkLU

RFwx5axabATsv5RMvSPHClhKJZxWoxmLx8+lrsBQo4LqhfJ2BmT/uqwGdWyubnNDeXRrzWgl1Ro27qH1

hozOknmnyawUhqN5764zM7jC0ZHH3zCSPZn0Xvz/3P
MIaf30wJ7AJjQnI57D7iR5oOrLl/J6bZ8v4uapvZgz548Y1IbWhbqInsGZwpQnwPhhMEk8l8ZCa6FWBe

jRhNDlKZuy/F4yaFGGREi+fJgYOf0kpveJ2Qi1r/fCi69/Oo1SP+mvU0wSDe9cY53wSbpUfBoDPNGW37

71kHGWXgR7kLsR1oxRegix5qUwbc6gtV5G8Jz9o6/Z
r8A262PYm/vSq1KwcKEzxx4XQp94UzGjuc/XqPhwZdpycQiE/rX++JuhP4T4f3gMGPjGQvFC9gzI1Zhj

frCXg6+n6UHgR8IowmyTbNzrNZ55G15z7UrcEqzszP3EpE1YpMrGmQIy1dfVHdiwgTfmK0x1C/KsNhFJ

UrxU6W3krB0/YOYE1HJUuoKSgUKHS+TftainK2R7vr
ebkwI69ZWnjatozhwhIn2khrtbTme7csQTof6lTD8lfL54alLVqSB2KChjUa5SbUx+UGUhXrtY6QRzrO

XiZKhNK7uMMOMSCr0VvKYoOJPuFov10X33Ec5GGEcScs3h7Aq+8kCTD44KmeBU/s8iMY4M/NqtpG2sXi

Ch0sLiQ9qJdum4SrRPmsa83lwfhl4RTTbP7inZ9+Up
ApJMwJQxc9So9KVIuj85r5RO52eouwHT6wY7aOAKxOFlFjZsJUPpYqsnI0QSJ7zt34PRStCBT7y/K8LZ

TJ9AgoVJznEqjTKbMpc+idofVIBetzBS4L1y6S8KHRfVvGou6+3izKacrmHwa3+K+/Gge797b8H/N5L8

E+T3NJ0zbo+Ge3Bh+sawNc7KK9ng8q2moCD5RNd2/g
Iyqh92J1sBxlCF7/pYkyQa8N/qdC69VP2SLcJjpcJvZJUBxGJ5gdK6Rzda5x7KAo/RZoIswf4/tFK2qM

EBXVxidWKCFekR9Hecxw5yHriMyRBM+OO35MG9E31rUtLadya7Et/4p3DRWecJJZpxTtH1uNLR7FMncD

pftWcs4zeg2d3EC9j5qK/Bw74+vucbn3Yse5E2VDA6
3pcZ6+vnijCe/pFuy7eITv3FossFr18lzC7ohUxBJJtu0Orcl5Y0hW3bd33OHLV74LVE7i2rsv2kgyQG

a4tDTr6jBE396R0gIlm2Z5CLJF3L3XxVNNqoF/N4z1MmiBcQo3mQAolo00jlFu2wgyh+RFt5S0nJmFWA

p362+fDGtJqwLzRIjePNYn8KUOxFliD4UvHm8ejPzK
ayaIPrqYJ+CLaft0/n4LT7J+uSJxXDj5wd+ieZLenzTYZVM9Wv2aroFTr2rDLHp6+T3eOD15pgY/Fhrl

Trx9xusSdMVqDyNfuX+mS/zTvlwJ3k6l7fK2W4F6yMppEBtycBKMsTYw7d9ncfCbHvbPb6Oy9t9yLz69

DLNSsxRVs7ufPhW2lwMVDPeKVpquJ35HulF6SO2aKn
uTpc7jTlRZZfoUxOC3E9W2oJQ4ChBQrfNakT4CQrCyhB2JZIgf65q6uYtdi57WyvcqePreoPSYRMTE7U

XFEmxDJ1Q/iyX0QIYZAPLlPaebr6WMw+K5tXU+Z813YdNd3N5dXa+TL2EGibjr+PVx8sjx4opo2fxOD3

4Z5Dvfg4WX2jgCHIGbwuh8sWNsxAhma0Dxw0voJgNp
gFqfsKarSAIyX3LGBiquYHUEXWtsi+SVGJZVupJONN0zt2jPR8Wq0lRzpmkjf0726xXIp4LuTva28VWy

NoQzxQkUIGMZCpM0Z469oCuBknm5kpBdHTRec5uEjdb1fLGAvNP5irN7ablRYevX6Q8R6lgzdjQ7pmVi

GxtqrU09fDWPHfLlcIiu1scyMmS1mW6x4XHdbzSOul
u71DFqOa40GjiiwPj53SV3J2V2WIAwiTZ/Lznq9yx6ICJQG2mNQ/99GNWI2J9uP6/okE6krX9J2ycpSc

Hvct8lOXJOsrKCRRoDCVjJJqiQ0LJ7HSY0yelPpozxQD9NZ6I2KyteChyD+plcAi7RLKV+wVmKyndEuF

RbN2y/zpT5Cmf+YD1ofXkCygfj8OGu2Q6jZ8Q0hyEr
ikkC6w43WNtzLA5Y9acE4qotRri0VSmwdu+Qez7Nex61Uhj2Hn0CYOnGFPcs1tEL7WgcbTtv0C5B9hJs

HJ+gzpJujy3TOI5e4XCwBgponD7C2KZikbwO2jrLrfKt2Re9Ime+Xfn5SUCj1K0ifXXzh+24yQQ4yNSA

XiMJqyqU0qlgfvTNfHLjq+u639A9EynjhDbJwBOX5C
WQNl3KR25jsUIY8cRkUoUYrSVt4YmodUoi3OdmWorayMZRqsnri3Q1wvtmFtqe0hkr9iLzY4f6AxuGLU

f5t6tO2HAkuWRy+59Jyf5MLGgvM4BwevF/39LkFuCuqA1aMIx9FcDfJy0LYXGmNJhDx0rXYJ6cGsoZap

rz2pL3G++ouClWrvI8ocT6s1Mg+1EgiKOCLMhu4XGO
8NvgQUL3AQOl+8YZsgAy9HTzP9uoHK+TsYSq33Qwr+7OreaG1p4STGnb2twhCzZ/KR34aXn5hRBnEEil

rW4RZO8c0+1uAUsT6Uv56Q9IZ0LRSQYoRle0pfvXUT9pW3tiN1gxmIp/0AJW+tL5W2MfFfDkte3sqg4m

vSlwZnZi9fFf7d/+vMYISVqZj0y2JasPBMvIejiG4o
gSytOnwRHTj6SrThhsgDZYRYw2wdhwBU+fcLY8KuXyNFYIsQ7LY3cy628Lgm9UqKucGNmQvqu1wB6IgH

tmLVSvEcQaStEa/ED9tweiDqDK+wLS2c2/SEHeLcOuU+gi8P12fVQm77QhaiP8Ycuxa0zs5zwHha5/5Z

uXnvjg2G6UzY0kJ1Fx9ITc7R+uS4mvnWJlnQwxjEQc
07A10p6t/dtQGdvH795DhWMEK3OjAeUUclbdlUGnuwRqoD4CjOdW6mNhIR7WPoYfqWJd7ivkBuvOUP7k

3kZCiR7lK40kMt172PxKExvYKZS7XIprrhglPnJsBf9leB+noVSZn9WD024C4GgeHxoUrTP4mtfMC685

EdfFO75hI+QwiEos2YbMtXfkMaIRsgf7jIQlDlCYIr
sBiJvFznFMtVBvJ/TfDL6Kfsqc0z7khEoC39axdci+ZZVwl5hOyBU5gkswfXWk3cy7Xbrcn4l0QFS0B6

D52HUwN349dADTrCogm+rKp3TxlbnY8vGQ2hRX3fkf1Nzz6ZRtrwJ9eZACZWrAi6LTbYfxyvpVv64DO6

jSweD2P+4C8Czlqmu+zSqeHX38gtJvmY8vfk5g27o2
+ZesIeXosn/eZks/A5HPKQt6m6l89Y+RCLuXWO6UV4sKZRvNGio4TcEeFu2ZL5o9yRvsxycymyjWxLdK

Uc596nJpcCgnPTEIhXkyaz/BcAOJEYGlxKUWw0n840CP7rf/1x1cbm5fFzpPS7DEwprifLOMAd1ehNv6

Ua14V27adR8DtpWt4m751eKjv/Wu7j/mLGWREQTwO1
u7WY8H9MgvX88GjhX6JczHroxvOLWMjCUhRPuxy8JzC8Dqm1o96YFD+LZlK/k/0UU9bxA8Z0o8s0O7Ap

hqXd6+T8Yl/HKYpOrDDtZPRjxbk6yYy3AFTL8SsQF06cxN234aXECAs7OM/ol2Bv18PE/s6jRetZWVE5

zjL2dntjKZj+lOEs44JX6UZr/YoOFuK7rGDKhTxbQF
xA7Y1CKAAaCkzuV8+ISU2RLDkv2mIln9OoFmlMuW5ptkgKGkhQ+vCrBzJmAS7osNR6+L67YG1O5fIH4E

44eF0hrxcYc683H6HW/BYQwegJRVfOzd/1aPE96pwndfTl2FqtEOt0IuI9bmEAib63/AVpC80t0frC8J

e+grT93jE5N5/9tuONhIlG2JvRmQZgUfuGGoGJas7A
dhLA7vP//xI1yXmg85ffGPdXJIx7eaPRBgt0FvTLFWjxEeliXaAprOuvqVyUOz1PJfx1SxgfxtyYrFfY

22A8QeHfMI9eWjrbwy6C7fEPQ7pZ3rdTr7Php+5TQU19wyWhc+4L70c/z058l5ns61RdD5WWJQ2OXEN4

t1sN2oxEyYWpvoLfQOco0iXzUtdcsrSiFew3cGgzd9
a19kuA3k65GLwhB60mExpWZ+QFwSygH7EzjpNlZeAZCqfhxYyZRKdafQ0qbZ6IYunXcd64dPBA0KXoVT

nkjmuswFPE7LW0gIxr8InHTa2dmdpNHgZsIDxl019VzCytGsZjI31LZFjKRd5W4fecTgnKOmCXfbUl7Y

wVsCN9+zeJIG7Sor6GKl1gmXBPKePoxVrtILwMn9wy
u7CiKInU/KK0VFZ1+/b/BhMxxen+PGEs1+j5tYNqzBPooH2TYKiwKYoafMQai28aQc72EhBNe/Yz+VdH

JCL3F+WKLK0mA3CKjf64NiEotnpQ0bGAeDHy00u3P1VvPQB/b+c3knvrvejrnRe0rsBh+23HbbdUbelR

QSQvGLLOUvpmF3MrNPGr+6fI9EbmCpLCZvUsML8Qt5
xZdPedlOEtpG3R0vjl7NGTH5DefEmxfcyfYiLEQ3wMbh02pbkiaVhr+2jteHGHzmb4b6U52mUnFCGutz

y3eWwTbbcBeivtBlgE6+vZ7RNOzPSXHRr9pJJ08mtX0JXlR/cQyF30d7N/o1SnTFFzmYsMqJ7a6f5PpT

2ix+6H+CvbKffhgAKgNAm4cSsKmkiifpThbX9JOuk0
/Sx471UnKphVFHpxA5Qx+OV67r8K5Uy8LwceMTgkquHR6tBQ2oAos5ifI2C5/NTHeCrzc8CBLl678uHU

dKXvAlyCDonBMjM3QI22gFnlOlRH5mFprhpeuyL15NhAPxEkFejsgVOYRni9+IGc/9ej0RaLyY6DzPT6

lBQZeobri8kJr67T4UHiJOk7FdS5AsBQiOiU5bwnrL
YFMfjbVn6xxSarlxIGJ9TDHhUuCz0pYJsY5tdpKuun+bV2sgiOjVsfgp437UbA/sXnSMbaQPr0P38sXu

TPzKq2wrLlvCjMofoALfOVrr5sAAy2BCouFr1CTwcRnrF2LqoNMAGiOdcT9yVhqmGRqpB+dfXjKJ7Eue

KtBjLxVFBBk0qAaiVb6V+PR8eu7yY2Te/Y5i/v3w7J
EObfwMA+ko8k9LWOgGEu/rzQ5wQIH5jYdx0m2c1Nfk4DhBl6xPHWs/QIPjgDsW6dTQMiTeUsYLI9Nmxx

4jw2iHByQY8okakWiv5FRKU5j+vSIQt5JEQsZQC9Sn+e9XUdyg9vEGWH5kqpPiBs3FpkgO5oUBaVVydr

35iLetU4+S6RsJkoLYYmWdKOitcoJlJ+dWHYKPxZ3l
jX3cw2CTxb5NUBzD1/++T/J9A6rz7+9tyfbbiC8WFISyWfEhEoO9D+aZ1pbYaHr64XOoWQmyYn6AWgaL

GmxtDeEWQXIN44R7MuKxjcag+ZEE74oWJWa3BGF0MsQCu2aYTsZShG4r8Iar8ZVE8gKnzza5j/t+0W0F

KAw2X8m/F6YA8HvmgJvGkOp2Db8c6kEeBrEIs0CN0+
QXMuZ3oiOTG0Ztahq2BGfjmBNHonj7EpPe7pSRIb7ei+sW4BBNrI7l+tBnbqSg6EG+rOgYs575nFOh/b

O0cXvQXytFhgb7EvJqajsR8sB8vTCOfGYtMMb0YjEE0rkv46K6m/Ou8b8RYscRi3hqIp/R1y/ZLwwJWq

1AFvt8I97/dMQwoQv3CLCbJtzyJC7Qpc/Xls8/K+5T
vK1edN7F4SjmaRvSpOHo3hgU2r92pnHE/w+0YVq34cLPlolIjmmCkyH8bqp2DDznrIzeTgbs5X7790v6

T+H7kHOn2+JkvayOxB7hokTLYW4c+mNU4AT84ob+Rt6uu+hJxJSoPpjROyEaneWLoB+KyikfpZ6GJRXt

QOdb6AA38ro8usNswN2W2tjaXNqfcyiLmbtRD8b+fI
8k6BQ0nNUmU3J7g8opGRtxNyrfxuo7/0hF2mVNVR1bHs9/6zCsfJWLsY+SijEQtrV23zWGyNAJi6otN4

VdVip38VLoZ/6PP3GUbf0AXqe2gUvUB0iq4hF9Peqm9HRl+QyJpf7MfaUS17g9R+Ve+om4IgaH++WuI1

Wo6558j7hXgs/yebrsnyKstpFxKgAvSDwCuRuCBY1d
YI2j9WL7CsOwP7rgmu73cwaxjP6EQ5zVRR0OdJuVcXy43//+uPPK/jhei19cHuJYL/7bqpeJ4EaVFkTE

7Ul8lzdrgtQn4UDpDp6UD+LoUX+TsRc5+p/C8Jzln9Rkgw+/fA8a26voyGxGh5q2Hzha853i9D1HC3ky

6X5mbKQ2qC3cnDXX1sxMUDMcG8P+R5VKAPNfBkEqd6
2yMj757XMhhgxssPKvhi5XwiZSmXSb8CgNf/NiXtuFoJ6kqjAa1m/coi/YMqJXmp54MPvkewt5f3Z04B

vfw5nYZAugPLYfO0uXwLVse4LxgGjqWFInV8YMv4OuuFOxSthjn7h/cqOTLVkH1v9aIMGbXXpjfcQe42

O55oQXZSi5QYNl3LxoSv2fWfNmwBvL+MZ21KXCJyKH
S7q1/j4bz/fpmy5VYdnXLqBAHFn8WcMC6wyVGzQCRdHf/NMkNZDdCE18GA185W4Hz3XSAA+PAVbXSD7o

mf0xy3A/p38YympV5NbWRiQYB2BWiOYMSu4r4Hu1fEK0qNrhiyBbYUCpR2hb3TMJt/YcJypadieqfNI8

EigDlJPid6KcfFq/s1EVImQJpqt5/Vvjmaf7OLQL9n
gjuPRLt8n4PHMF5FF9RxNPyqvOjDo+rfJj3t85hJwJYEjdhZUmCPLtkA/1Uw+ZwFimfP9Y8XjOX55NJv

9FAQcl0DsbXiS2kZi7Qng2BhEIR1zW7WtWtGBF1yY7aKaojxfxiajxzNm+VWNyyphmiFcdx4GibD+bFp

2PdsGFiLrAtHNcKl+lO16SrcUJq6rDRrUVjv3u+hW+
3TRoBqaukOlJnORli8aqN8qmIxBvWgyjvJwGsB4+nFDGwdBRoLnhJw5nOdFNPhUvKwSwYa0R6pbg8Nhg

Lt95M6x0TCwmNj+m9pdABya43dyzweQYNj9zI0+M3tTY1L7mJBC21/h7MfbgXbvddKL437UQns18YFzE

eKmbvw77F5yFqt3g7/o9hk5iraLAEEyrL3uhj7x7Ks
NgL1XABlvB8fWBN+TPCZ3+K/LZjASbXucSHX48gPht9kF+pBqwNOoCa2IHb8CAUvyvf3om62cK/ksx5+

R40x7B/kfN9wUYj/W6ms47wm03iJ7K1Tsh1Z3UacG3c2gM0oz+65DFli4bIIQ0PT3iYFMiknyBwXM9Py

JdR42BnDemT3gKqC0hMiMaELwnnk/6xD0v7uUEivqe
fn8IXdDV+UFx2mNrAHrInw9mByx2g1xaNRFX3mvwWn5Fb08Nb6nasZ85A3GhQEgr44nQUqARJc4Ox8H1

WGc25l9qfRpzP1vVPyHfAXVZxuHFxKXbex7oD0QX8+Liwd15kX4GTTwFwCaYx60sQsTz2QFDDd3bqi4s

plOP/4fkNJxpnMzxTb42Zx2L+/ci5SK2I8giMCLxmr
EGTFDd868nIBwT/rE82S7DYz30VsssmBSiaK4wjm6O3I/julsZZIDWPEdUGUEpDmazfE1hHOHnXeqqnM

qelAtySJgtoOv36KKDynNb2JzzDif4sUH9eR1w5/8dt7R6iDLDc68fraVKF7kO0zvdw+CaVUjghUTmlW

akMkqnXT3MvGMU6wdg2spm/6Aw40Co/N0/ThFpDuLP
c62mzUQ6bch6eJBATNu7RYkojR2OO+/aBRSwYQfCb+Vf/C3SA9UCnjbgkGSFe3fVPvMK7f59u3nuwypA

sTr/d0tXwdbnd8fDABhhwWxWw6AmgA4U3O5ajZHKz2ZFDO2NCyX4sxkrGMIvBfomOpy7K1MDgamkO3cJ

/hJSSRkdFTePBhO6u3M+2drBeZP7ijFNCUyZZtixlw
Lf5zT/T1bHwkym5XZoW3TdtGJeThBi6cMpkVNEy1lQP8KN51Du2iXpZfmEsq2u64XpN1X047+SApX5Wc

Pn1+sr29p63wxIw0wcn+3j4wEQysUOqL5dt2Urv/hu7dm1PdIAOAboXabYANhHqZGdz4lnqosciynhNz

l/lj8AZVs0O0hAc+zyEg8gEIPAVgRAaTa/Wlp+4HSe
ceYhliWszPBudX7IoOEQbyvm83/RkoXjaL4RS0UCcPFsjN/W0ikEc7kHHj6mqXkO+hVfz/d23v/w/+D/

7fgrH/5L0rvD4jO9sqKFNYdVSaHorFGNINObw/7z6AAx0wpILIfdKq7yWtdwCjuyUW6r0CcVVYR5svjZ

qPXk7guY8bg5Te6zzlwl+WXMtJkHZbanERLdV0Kjs4
Shd4Ide2/qJoElraSjrg3B3iKdWT+Qq8oRAMi7Ekdza6IMfup5uww8yLnuLl40X3/Uw4X2pI+XBKAcZC

sdPsyJ16145+8uuR36ZGqtvfhJ0DWJ8YlcMuI0EbDMMiylMCUkUPl9GZ+KQ/HWLdeTF8qmTBIZ2t+1n+

H5sv/tdRLzH+kCIHsShKoebTSgAyK70tQGFrhFjdZS
AaJH93SNhe8sYkF9YLbxQaAcoRRuOIOV4PhYnubXCbiPLeBOcarrV3Qw9wdw5Dz3opjewMsnQQ7jM0pX

GJyQkCNRqa/o7dQTNkOsdQa6rPMRxXXbWIXqyoEDszyLzgJSoW80yk//31KTU6z+iCBVb+U4GgPjDwkC

z89NJqkAwVRfjRAFRCw8nYK3m49j5W/9q42HJ00e/r
BCmBfCUZPxZAFgGml+PZc9TC7ff4ZYSxfxW9suPT/6cwGbmMKavMicp0pfYj4MOeBgPlY4gxhu6VP5ag

/wIFHi83yBwaRm6nOOliN1AIeWJOe/L+Z2agwKTDCo92KrgAbiauwpmtVTQ6Vc37Ag+ovglOcseXERZ/

l64iA5tQ5rxZW9s8zDtT1xmiWNuquwLeg2f/O8vvtY
buXvZbOUSUpgcdHwU5CnHceoSna6XDjV2QD4PF//noGw4cvaMmRVWEBwMQOwF0f+hk6gliymCkTA0nDI

8ia1I9jjEQumGpqpRlQGAiSJtJavCVzRQrbe7LJMkqNhcgp8/Z8W/yeDyOo5CO+lOZPJ82nSR0bUrGKa

QXLCCj1OMwCcER2JAHulp5KKb20x+ZlQVM/Tdfnf81
UBuYZcSx1fDmWKhJxOxDBio1ibHyQQn6HwvGH9xjX11JH5ai5s7lnAdzIBC3k2/sQ7PlurHZZ3JZ88+2

9Ymv6jVElmBURHTHcMmV3RFvo1MKl6S1SSm7QxK+vd3JB29vBVlzw7h6buh8/ts6ps4iJBqfZFksClZ+

Wo6x1C6ojRP+1/fzCc7f9FoW0xvT/Z7KG8mEjGzDYL
HhN9iz/OdcVRgJkyyC5tLeX4FNIgFLtGSao3xmiR95AqC2F6IGHmhj/pjBZLK2wfP0LBz2tuRxKXyBbS

9+rlmArw2n8Zt0pKH0iSquonZ9VhhKs/eHjRdkNeBcWutCUqh9pVWDXLMPRvHA+76bsfU0x2VigYh+dC

4H9Uwq71oCBWhjFI8gnb/6YhVFaEcg8q3YUsh6c/PI
7MMtkxAFAngcTaw3pVR4TTPoH+ov42De6cUYZpqIoQfvnqU6kX/k0dAT/gw4sc2cDAc7TscI0w7IgqRj

zDhBy9nevllfD9r/2fbG+tLbeNeMs+yMtmy8s88OlxygTomWOdD+RvBXg1INJy2rcbABxwJQEviP/CdL

re6v9OMWqw8yoybb4HsM3iFUhgX9yG3a6CilEAEeVQ
stQDjo+PPXljLrdoQviekz9fp+U5mFiXAVDGN877EvYxQcg0Iiu/X08y8gHKTXY71ZV9PbkmxN/WGL4p

4vh0QjlMMFZq+kwjG7u3/npzpy/X+gSJBWFA6KGVBSmrsHmRiOjwavpPoaLkVWYUT+4AKh+3TesueRVc

xUl9jhMpUdMLIx3vuZj8dicJfkaWAPuM5pqNL5gJZ6
lMF02YHu1LFt/a+yytMGXIzRTrWT6aiUrbFoHzKK59Xf/ffKHlx97H8KJ9usSGC1xmYM4HNpg8BAV66p

0gTgzAZJbRs4pHbBDb8jucreVvg5oK0gKAhIVxTiclDSo7Mc6tx+ISuG72IJBg79wJUP1d5qCPrJj4C0

BfGK/UxiRDzX6L6APDs6s+FDsWaUV+UjjiKquxAmqz
t+YJhw7LixH+I1SjnAQelYuhxYR8xJOfT4GxQa1fzhRNZzE+rsacMUOfcYuDW6xd60nzMmdYwCa91WBA

XyigCq/Y3pOJUxbeT+cm/0iE9ovpRaFxd1oEpNUspYAAZBivI8zh+VNB6iziFFf/kkmEUCYdmyEdChLj

bUeceHZWxr/7lXkwhQ1pJk0JmhUrcxzVfLdj9jclMw
ZuUDkIoUcsatyHeahBEsZ7rc/dSngHAWaWIysjb5Sb/PZ8OyOMQZRRGHmH5fOoOqMu2ked7mRLZS893U

H2+cgGitwWQ3nV444K6XJwnKXt+jJHgXKX7Nd0c7uCIpKM8h/E3PR1xM6b4SlwuFPhJiP674pHgzosFJ

bSFm+jTLbWbfc/HNuZVbwE+JVOjQ8wlQpepvcc0+PB
HoszYXChVfu/NMCZnvJsvd6/8Ed+r7n+hQUK507I94n+u1CqjVTxcPsA+AnWnvJih/3cBuWd2vKnJKkf

cOBYKz7iuCOPMi7UJF1/KvTpeh6MZCREGw+qmajfQzkT6ZQHFwqz7Z6ZebC2z0Ke0WVg39CJuHqT0oay

1zILgifTqSgR8HS676tMEfLGBY8SCGamFXpkhYhxlX
JtZQmoY5fAmFdBlQ3K94NhWgwASB8EiW6YG7Mw/JzBDjibQyQUv97pGyB6yVWtuFc+WiX03qeemxztz7

jYtkeHmpzTkXhE6accjrD24cwIyzIRhHD8rLQsljdhfFdHY2dWRljGqDLXhf5QAMYdoornucZ6G1wwJJ

y8xtdo+DmH6SycjvbBxRyEn36f8IY5dpHfnizgpBi1
CDA1XuHdPyLEiq+CkWF3hfN6w3ISFOdkmlgSNxSlk5G+3jOh/bhPT9mYu9l452pBhqqXrFy+rbBrJBnr

SkdGFCp0n7UrcEahS+Hv/plBLX5MYGK4IL0FcOFt+1BIH3ojbQ+mP9kCdUzZGTZMeF0Z/zVNd5DJukiY

4sDUilLjpJslJ+9EjmvWnFTp8/ZiBjvT4AP7yxSqya
fZMP4o6sphCYoRxRZLG8isx4SUhg/8phZzIbIKsM0+GxWx5/dWiF8ypEOIbqG/PH2NoDMcRYUktY8eRr

9Bio4tkkhx9hHRX6DpTrgkIsBxg7x5k8euyxtPK2pTN3Sk+I/xxGhDvIWAsaxcvE2n0bUD661EZnke9f

qKUYJMZkdeSUpNC+5TvxGtKWE0RtE9LD5dhgY80VEF
Hwq8lbbavfj142xAA0jmrd9xJvSD/mHub9OVD5be4CmsGA4cI5JiA1gsUvRYKAsNqaLbAqcNu+ShsDyH

JSHU4pC6+62t05gKY44TL4O7yffg+7WThmd5F+hAdf5mzuWS9xBFFoK80gYfUvyW20a9aOn5sOxLD1+a

qZh52S+CybMk1FviMbkZ8OZGZ8rGxsC8eNhSlSsHGs
K49vaxLO0thLH0ey0/hsDjxJkCvdSsKrczQ4WKLHMoJ9w9ZQ8GqDGHzcc4pRfgQ+V0mBJtS0k5WHS/ZU

R/Z8iBPycJ74yQGQ+gGlYyy2K62HjXhdS+Icx7ZavK9moc2/4rxyt+oAxln8ow0fqyL+oab8gBj/s/KI

vuZmEXk0Wb/EvzQlfIx6qkqykwbgDaas6eBugD3TQf
C9mFjPgbvCzSOPN04Vuk128ViMl5oMsQVMi9et7lLTbxLdu60omgRpP9IFDF0LxSh1c0XjO7GdHsByBt

fyFz22HKQiRaAp1RzoqxzqQnRD+wXhYp6Wmq248e/A4zBvhoro5cZK60WGtqMBUnX7DD2nQJTYShClQX

rHH4k+V2r8ARTSRS7y5Ug95dmOUF8qa4YfFueQkuOm
u1Dur0kJw7dpI+u0ggW0fE5N8Ico7ze6S+pWYFGQxrijG1A3jPn/+fF6PljyQv0wBkld3fFUP6/hO3sW

Tw3hsFSJTsO9MXg/hHXB6N3o1YSPlVE0AtglXPXpEqb2vs7ZKdbB/47HnYKb6z4BfC9uYH4HVwwyyuCC

vMAxTqMQB2As8Vdlom3TQSSRdxx6+MjJ/T3/gPImAw
yqFnzUhR+Ejj67kG1c4u45HCjOGTdvkp7aOYhJFcYgghjwnIV/vOglwe7YnnljhgRZmZkBCrsp31PA9Q

6BTfAtwGBJzv/a90fXQ/M7LB7SQZdhGSeUbyQfIooKkGL1Ex4AUZVgmxksuZPXt0OXhOy/wnIvayw99G

+rxF1+2N8o+/IXViArTJFhpnjq/g9vcA9Kst7enWy/
0v/3js9oXlqPpg0f3jiSA25QYlOkWOjNdhiCbpEdk3hI+zXP+13smd3TOxuu8cY5oHlxC2FvPXAB79aZ

zPeIjchCvl7RnT1oErWXjlMWlMHdwFB1lf32AvARj0TFxrbYnTXi1kfNz6PGxUeFBKHA8QYTH0FaEsrL

90VrCNEJlNcVyxpUHeOOop7W4DtVpuixF+9OjS7wfn
tCizmoWdqzyM/bQT6PNxUFE3ho2DXY3UFCCwbxDPLx4MQX83Tu3mlYS0xMzCbNKQzlhr++BY8o5VC9q9

O5iO3M/kR387xGc6pfto7Ol4TI9A2dtgWxTN6tTwruE+snDtGOgLQvWZuUW4Al6vnZbZ/j0yqi3SDTFg

+78gWugYWeIv08ejtJBfBQsw0jnZRLnxCdGAjlSh2A
HbvZhpfUKbSZlhqCH2lq6A6C5wM5w4iu7g4ftTM7Ex+dlbMMKAY7oA/HSKlzqNzw+/PKH1nmoGUeo53E

/XO5XNaMBtU//eJS0ZA9lQsOma4xQzAeHpR9FZRUiomEhUsSkME8cE+b54kyqhvVPeNJU0FhugkgrtGG

QzKVk2dO7dUgGnGDb3f30BvmSRT5y+eDfO1ZFtiZfs
M9ewD49o+QGAmO0slVi1rznwQ0rdGu5y67rfpIRHCSFBi2aHja6rEWnYTX/sx79Gq/Iw/ZKhkGMD5si5

hJK3AVHMGDW4wA+hFOswFsPIfgCRkW+wKMemrt0KCdagXl8HmN3ndlxknZBVS3VEbdCXuMoR4YBYitOC

Fwtmxoc7uJ7RCabAanUl6guVYeV2l1X5d4HzLATCPh
rte2/5hllaYnSuaI9YKoUStVxf4/3+wwCfzAIWR6TexVfp6rzaAbBnJ5lZjc0PrgXJ6mTBcnqU8yGy1T

ZUZqYnxP243/+EAAy4pSusHtCkp5tPPbC/SY2zJAM7T0e+9xZ3q0ZxK/i020/SJJnTs8CkgUfO2sAw8k

ANwNqgCdX2Y4jvTwkIJxO89aL3EgB/Lg3chAGA79Zm
RT4WjtdXz2mwOUt4syqkiALAtdELi9oVLwmC8H7/0o5YzT0fd1w8bOpN+0BH5LPzVKt2b1EKWd5k0i9c

oOlcfuVT0dAxFHUE2VzF3t/YF+CuBQQabaNB7/j1r5VX4zDZ7HyyaAIumDJiT8RejcTndLbXUfbf2GdG

5faqlrMd8jw7HS31vohDdN7a8E1maVWuczvPeub4HG
uevzhEJswWX5yzA2O4Hjg2i554iiGaYy/44ntOFwt9Qzf+3JdNOJqseURsFsGPHPQbhDQbSEiflTKVJa

3AUHZfuzA+EbB1s1GqkdTuwFNtGLpUM1CsX5WttDN8aSZbchak9rt3B3RG0oYzB8KHRpMxqYRspsh434

sy9OidRgDz4qPL/G6Wf29bKIsHwHm7SG9+heYg8SXR
7zLDIRDBmIx6JCV5Ad5bd5x1G5nFTZeEeEU4PkydXoRqYTcE22dtq4+0SFDHUfghYAA1N5sxiQmeyvd6

7co/N7vc+6dPDz+AS3fGjgNFRsUiwRl4KYZyTzC35wU2+JzbnnD6hSd5i2P7zg5sHSg3la8mQaSD6Skz

gksPmaWQHAnnismJGV8hWAmzWFltq5XRaUTbEaiNpn
fbqysj9cWL3oaPb8ZGn7b86YPS25WhToC77qGv9bUaoCjlF8vZzb13Ip0AoqELAQ6hM+kULEJu/QyZwi

MrUNmc80dXBD0ibhthIlVp/HXkuHGP482ZXgRHOuwz7mXoxTbPPeKcj3sBo/ECpWlW85JqmHd/Kfnf48

APebW7McqxnCoRvNSu1V5bk8jarKX4ztNcwoabpMNd
0c32AQP32Iqo+Koj4GzMXF12pwQTXMMJtbctW7cWvgUu1o/aelQaU9iG5F2hhsbtHhk57gjPAAOWOk19

ESrzwSBJ2B1coyj/5NlFgWiN0Ko51kcoacFmCTEFu2cPiZIgrTnO9daL47ZtcYke2FQER0NCPkZZAhXj

Rg1MRVIc3yt7b05YqXAkl6tQa+2ny0OJz6zUt39pCO
/rzOg7qXCURg7kn7aRbIZxsvCsGITNSa0WmbPLdrPaNFjRePVBdJCslXZyGG6biwMbYD+V2Q0a3Tukls

6IGiG2DIecSnOf4nEtx6FlpyN6H2IV91DOA2dcx6FJeVviDZXtr+qwhdAOIaAuIzIylzqndHE5w9n9uk

TkEgB049AX17bNy4gueoOta1Yob6nJBpVVSDchrNBc
abkA45LKrjE17eqPHogpyCACIoN774HkLYcdeQQNc9NBsVTprZKn71WD8XtvhMABGTLEu1sZ+UnjQdCp

Pdhu93rRApbY0hZEZPDTho52lgM39rjIxggTcTs+O0wuTRvh6sb0ehb1g5YDN1o5l4pv+pc063n5/hU5

IE3/X0KMNyLI5uPKaJD+L9fW/vdLY+Y1Gy7Vodx5u0
8gN8+tj2S5Wr7S6gwZBKtojMvJpyQJKtbq14ZoBdNZoWve8j7/auX1A2CTRPe6UdNSQw2KEijEagWaRK

xbzoDyX1LIaEgvGMJslM9c+DP6OCgvcqsVMzr9jjbCA9VbwOn8dqte51ifu8SiDr23QZakp0g5MJdiaa

WhRje0JTwIjpcVDnWm0MLfHxX42E6fO3CfB+VaUZ2P
8FlUlGNquKcjkPELReXJ6YccsiVkg5QNS83wB2ycXpI3rhK6gHj80sasWiWBgoy18Zei9Shb3rIFiQYN

P75BxVoHEi2spHi9pkg0Aqb5uXxC282gYPmMwmAigS/3CD/GM3Szydg4pMs+gKJBBk6AmoX7eEVlDSz1

igcFpCeOXigUVzoezgTJ8Gxs3NZcwXrVDOenmmAFiu
7sSh2ccn8iE+EIaXAIxuMWBErChMzEonSY6azuUZ/1NRaO2hhCGtP/XUfKlkwfaMJMv2a6RoLV0jL3sR

hkvmX6T8+K2H1sarH4ZayK6XKuquYZi4Q1BU3C4JbWkBGbVMpT6NqLeu0m9qhaUFkhDSinoZSIWUpErT

VQxEcmQY0AahEwVctLbo3a7VrU+TZ9dQ9aqMiFsFll
SZLvP0gyk4gBP3n0++8pSw+K7AunX7WXkHa/6bpfiGXNiJv0pycJRLwRxPkKpjaC5nT997XdvlQFI6WO

MD22Jm7PbXMtdLoPFRPqeB6dQxgZbPgczCq+fD3a1qiCTUxf+7uZMkZUQFMIqtpJE3VgCb91PqcNrkGc

s5VNHXAt+xrI+dh2Ivun1LaFkRZi9of5HnspkMFdq8
VoyMCe8hQLPzuaD07plkXGuGrUpUwJE5QOJDyt38LqtGOV25NxDG7ojRsRf3V9PK44TLBgi61iculsPT

Hp1nbI7jMbD+LDMSIhNeLyT7gs1snB48esj02KbPb6ctdxIdIu0upIbQ9nR7sUSgs0UIU1tI7LHk+eH4

18VPs/fLz7KUaSDKyuDsvLOyhTdHp4EPREs5SqYGOA
FV/o78GSuezK/1RNFfpzh7Y56LnCBBo9A9zWMcEebs64CmhvmwVkJtgowyshKxhmVJ/qzsF6z8uWokUy

0+Ag1uhrQZS/lGkfT1JF/0fyNzRAh9VvtB/lXSD6cASSn6An09vXXB/OVgOOSH+p88zyJpegY1Pg6RN2

HIixTCO/ywbz3Szi/05Amm31iIzKxir1Ugr2O0rxrA
ape/sb+onNwVZwkgakgdVwfIC5GM1m16ugqK1Uk9FcCBJOoGEx2DDxAHkY0YxhvGnoA1gVywfz5a+WNN

+xT4I7Ba4NfFLH0hLBDWYTJh3el9HLYWIbsweyQj8agkRbQyEgbXAuvumFvKh302NTRpyYUBDi+PBCQS

AXv/Ws25fb1qGsfDSgg0T06avqZA9NCOCmSQ9xkM1x
t7yotSF4R8z7ZU/6IctlrYkSVCYktMIYAng0TyyuUkLVcw0ab+2uFC6O+cC65owG3XcE4dgErpcLnMKd

A3CPcOlrsauO6Yh9D4J8ht6exY+BPdBBT/sBgrAug7YKjFPpXigVsTHKr/mnGuhduVQwavOqmV3TOahw

YUBImqqVULHlLQiNcnXdwn1pvmjRmT4KAtht9+xSu+
HQ0bDYD21/36KdLN+d5M4X6gddJ9nfqVkRDo/q3s4Cgp6cC8R0j22th5y3YKX4c93ZVDO/RUmkIA6FZt

EKUL+TBNfJbogcAk0ZPum1YffPXER0zrKvbQCeYajBugTlIJD0NrUzMIrDWjGeDE2XOcX3BZmErIyDip

dbyP7R90DZdWDtCd4krXYEc5wrfZluJiSeY/TNeD0x
E0R8+8wHCDbPuQ7J2g+yg3J7EHTI7SlJRwy2ipqcxCLfzXQ+mfMtVg6jc4q+vdTXcDfmNL7QjrXZK026

beVEgfLj5Nnkw44l3aSkawvG4qwd/Du19XRRE/ijaIUMU8JHbfGw0xyq7jLIIZCC0Os8VXgYJF/NGREQ

Hyuodo6K/Qaw29wyXkE0H+BQyFWIadHjR93TIZoiML
1ITePwmIpXROr8ZNpeC6U4EW9z0XZ99HZuStnmM7vL2Dg02VZsEGZAWJI/IGbkht/05z2WEkvkdSjunC

KrDdjW6bmEeXGUxPRK31edjnfu7oosvXK1uLGgNoe7uumk/t9uy+7zy5yvgXHFfpV9RAEWvuSh0uF8JV

fdU0ghJdaepU1gskXw54BluaUHlwDXJq3lZBXnAJT+
V5d91cfs+IQW/+qL8zbBgiM18IuXfjIpVnDhtaxmKH9FuwY9k3nwtYc372zSKvNjmKyvFGspBt10kYjE

cMLwNhSU/7PaBQrXsvi9aWehT/xqbIDN/yBGEF4pXtUqosQ+1PETqmfXPnY+iyce/7jQkO5+vqHJyHMg

cZqNZXkANa5imNHA8JvA8WspRQDO1jUTwGxNbmHsnK
/B/7cljY8s6eXQAmtyNbLzvkWx0CgX1flO7m6Zd3Ubar1qxuYOHWnEB2zNWuzl4Bs/lkjRHpI8bU7dui

s5A3ZT8+Jf2/43ulWCWVd/Qjgxsu93yztZkzj2OuGzXqpV1KLGXvE+5l+Mp67QbWrjuO8Q/3Rwu0C0gq

dMHltEVK5Z4kVRguKjF8N00CX5RTLP/RW/Zb9GMi+z
aYFnKcBtbasokGp1wh5MdSl566SEgON1zeeFEiIoDgUqEcwMma7z1P15PnODx8NVuzELgsY0JRvTRN/Y

0bdd9t92Pug5h546iBP3JMT/snALTAtvpT6YX7AW/dR1h8e3IhNhY7y1CrGyLBvLNKdv25PffbOe85QQ

wt3kc7IuQqzIVgHcgnnLb/fDIkxvA1B0LrN7yTO/Sf
dRg0/r9ebvswL9Bz8XHUx3qF65TnbGbyJWGpGgEux1EzSPiDx225i7fpzUKR+xlCa1YbGoI0+E1QC2r3

9Wuu3M4EAf4PAjrfbbjz7DBGaqFzhKHAQAPMWya75tVf2hs3yF+xeVSxYOKMkT/tiqJoONBEeDh9HMNn

GBnZvB1f7rbHumroYz9UI3tGXGYqZw0fQpT3Daggce
NmOoiy8cKDZTYacpD4nBTlCc2U7OBzU8Ce5jN3UBu7F5K/LrUl+Ma7o0hwlKTHRyIQ/EHhDL1F0Lv+LD

jM+cQEZ1rb381Pu82xNLiIRgvRy76Dr/yjXJbbE7Y8yo114C4XnfeT+XB6P+4+4P5H0ezUlRDmBBTWuc

HmESs27lnivYk9nNS92F9+O7p6AO/Cxs2qkVC0a18F
ETB/CkY5mou6LN4/QYyBZ8ldDAFrakWTulRHtFv12qnA1xlwDoIN1msceCfTeT9KJ48W0Viqs4XPU+zq

yPTeVK2jEthV0EZEk95OrX7jMw1oteTEaUhQXeJCxoaHOifvXMn78r9veGU/tL7P+kOIV5noX9GByOO0

wID2fV4yWa3CmseeKjeesSzJ/1IdKPtBUZrFkDqU2+
GezNboDk32tE8MSFCFB7ZHiiUgOO08+FFFtY9wtyRJOFOTGTsEoTdNgUAyJy4+MrwHbzjWOiQMieBx6n

90aFcnifPIw3BhUqCEE2JedbeCovLPfI6dmtPxYgmte1SzQBhd4oBYGa+ihgRR4xel5Hu0A28gze4a+F

vHI9iAA8Gr40lHYbbDzHY8EF4NzQPAT9KBOJGgndPO
u05NWTtXh0dzSVRmCoGqA5GlK2QYRD6Dro5am93FkatAvhTqwiZPzo84YFZ0TeuFsBIHSEPnQg7dI08W

IooyVY6toYnnWPveuLsMkAL3eOMFVydEFQ0/qjl+kH7vHi8ZHB6KP/yvgGRfzbBfhwO3dQvQY8ga2MAc

+jeTslod8U30lI54jJJ8Ph82G+CpBpYZrCC6BLY2vM
uM9Byk/7rqkpCp1+ph7BxQSq/XIUn8d6DD3OEG013qSKOF8gwE4jOUZmriSbnYKe2TScykKAyy8kQcjb

NzIidgqOETzqJf+UK2spiQPSsSgJDGva3evMnvNvwpwlmf+Z28TO/BPPJEa0Yxw3Ubo9x0NhL7piCroR

5dywxn9sn4EuJL79hVURaan5jAUs+dFGgZeQjI1Aq2
tlKPMscCCLZKJow/YZ6ZgzazCI982aH+EJeHsk4/hF+pzIj7t+tYx9d9hbhm4u2cRtPXeuj1ZaBNbHkO

a5efOqgSoHZaOLyaBk4XU1T/d67soIYdFuT7WHGg2T0i7jh5BJgwDcyGhR5lSWeZi80gYH9LLAhGbmqt

i3vMHLZqY+6QIVfTInIHf1wmeCi2fVTbaFc+lGmNAq
hAwBCJx6wuTKLV1rO13Vd7SxOG9DxfNJtSyHV/s5PNRDZt6ox4aM61mguQc+57Oe7yRKw6JUVbSkziQ7

wcYm7Ls/1y0YucEpI+8Lop28XJH8pvHdYaprMfyvbTHMG8IQxLOtijc7eIyHdh84+1CWJ6coJBCfcL65

z8T/Bgtmz1jXbp0Or+gFeoa6mY4uUfDPG5XLhqbbAb
+9H5RBTrnw2UWsd+53MhZK2tnqP2m8g5TlL7U5A999TiZ4WqXvwhh2ZcODUk27SK0RKa60C2X4af85sA

2NNBv2wMMtc12e8Gfawztx+WCMQAc6F1r8MWgrYomENhAeEN7gZw/JTMyxO2pahRvk/QY02wP/I7+4VE

aGnAi6/0zn9vxqCulvm+15oR7xv6DyjaK+ZB+yzdZ9
/VkRTzpM1h/8pD+RPgts0YZTz44et03IJ67o7sCzSfq+CeBNcM9rHG8yyGFBKgyWX6QpOzRf9sqCuAxK

pof+G5x74yGdykd7jVvA8oXCbdntRY/FBT+dY+3ZA+ZXX/N4AwQQD06I+ucgyTl54C2ogvYY1iompNT0

Kq+kATgUXuZKvWbJvGJcLboyCYutvNkp7AEhqWgcbl
DSG/Prmg5l0pFgMMofDRzAsroF4Qn1/SyGtKJXod9tEhJYbr3LAflcuRTyBglqBMCrazMv1Mpqm4553U

aD9vohv/mw5NXHEsnHYA8FAT2w59Xi0HqZp3Z9rn1HJw+dIacgNonF0dafFSW4LVWs2qXgiO2mxQV0/l

UdSbdZ3ZJtWu/RNrPWijKMhb3SqDzANN+BGWF8/XcK
BhkPWgq9ddvIum+UUJSkiIsojuBHpvROhr74nBxI8oDkdaM4yOVq9ndp1OCNLYsyNS1dz6rRXcQRLiQH

ewtMDPr1bujeL//fm4Kp60Mkjxu6KmmDvEeptgMyxMippaze2t+t6+d+y4yS6TOdoB/jeB6G/5+fQyHI

l+pk4jGPd37H8vRCRnYAseTkHAu+zc6zVupu2/86Fq
ie8vNRPzk8t/GsMDimLyd459O42RAa+4P7NCjYnaGmVAU6idNJfXK+lGqTkyPRPKhSRvA3czD7TlAGfx

jzQ1Jqm+Wraae1M8cOx/zyFH+h5QH4MdN+0ND68hL7KQ+SDoQ/x7SrVxwuxNj5xgUelcSPazcVbPL8l0

wk0P0ttuQRSe7otUoFOPJoYdjEVDn4KwLBS64gQtb1
aHl4xO/1175U+pvt2MNX5TYu+rPeMhaE8jk+yKPoTQVloRFo/Qe2iB0/sK2oBbjwm6mQoJ+dElXUNGYO

FoIuhOE++8t9SHBxutBkdJg0QUGQRS1dVWWbN8wf9giWVQYJc/qFTnd4koSm0b8j4kv0R2rIupQW9BKA

Q5NjT2WfzPh/SP0U77PUxC/TsCcoXesUmKqoc+T6ap
u2WCAKsLHt65+PgjRxLrCRePatiIgxBKRHzis/yy13HtMJOixpozrvWpNC+2WcBH88yfwRDtCZ8mcFE/

hNz1/w/mOC08O1UrlYQ6MOr3LWaGw5EZYO5qoiQLGLWQGaB38L4faxchqEWNDP8XiciVxNF+AN6myLGa

kwQNhWwpaZ7gHIWpWpPHqtjOGl6Scbf3yAzBEwZPTf
cIsj9aMLMCUElTkYiP2MK5LsvNxg6mYlf7p+9sn3gaXDfAlSjB107TuVWs9VlgZliRxd95Yks5E1os2r

bnCDfIKQNV5xFIuSpNz3RuKOnVUX4QIp06sdubAO4PEb5g2xTlZ1NKCYx9EfNNR+jLU8ynHAaRhM+Gn7

AIXoB4BZnfUJf+622HhHHH1IK61g61W4y1PcG9Q9tR
6fr1op0gNu1FFouaIWjQyjM2ewOwBcsQOZE6k/T4WKokfHYYFv5WiSJVSTefFt0yqY31qDZQDrDrMrOK

n1M8pTs7bLdkozq5+tSSAr/9Cdr9IlhyHIlyP/jg8KylALdZiUT61VTbm1bpBSVvWyUSxN1K99zD3/E8

J1nhI9UWRFGEgImz9oQ0N+aggSOc7buAzqEUiRqUt2
cRSm+MldYeAfyLQw0IcXuErLiCtjGPiT7WkF1VtpAWFC9XnPSnVJgCY07GHOBh0VqakPU6UZovQCI+3G

9w3Akd1jkyqthkoQSaonI+y3k+BPY59KvaI/ExiYj7k/YOcOmfWvXCurLugvvpFC+ShcTaWuWO5pWp7q

gCL1yJK7whTXtRa+QwDfXxP4cT4o6cr2LZK1W12FVY
tXKZDE+Kw7Va6VHszB4PqQgTf8TS6o3omnNxU1w1vn85YiDMJy/yM5LBAwjwI28u+BaF5XQc5v4tix+0

zKBnBeWCH1BXOpfZs/udgJnaghsPgRnErils8h+sX/nG/uxG9njx5M7nFIRBl3Iu9cRwhOINbtv/t1J3

S8NM/61KM1L1HcVSpODp09leqU/30YF0fxo7ENZ/YO
+hmyYZuiuXLnfOQ2BO4t6IKwfoSXDpOsQFibvz3CbAxBnxIUawzcVzd7MUrlTmWiib3Vb/H+IuygwA7T

a8EOV3Bf5bPKH5ASo+3m9oapfbMfs6da5QYLi3k8CqnKgMsXm/60xkp1xC9eVUUOb5Kmk4OF4qPB2OwX

of0qvoib7e0Q+vnzA7zGQbpo+ADpe52/nmBNaEwhTd
4VB2NNoI+p+mWE6HLn9u6yOCGUflnMyveiiJbAQEfPtnoR1xfxHCu69kMSdnsfczPoRbJ5FJCPnx1YUU

6NxEA57sw7itVMr87OXWqzxGBQ4ZhYJVJt9Y9MLqgV5QLYUzHBs2gY/6l4eEkyWBOTz0nQFR2ODGZc6R

wfGFj59jDDP7uSwhFNJiWRqTk2hqRRiH5GpeAbAHt8
y35Oh36sdVeZRrTLBDCQfy11+HuYCzBy9wvifOQVBBssWdutG/qpbldGskzMsDtgMs1Bi0w1iEv3yUvY

6ODFnXP4pZ0p52FIqLcurNYjcLF/B+/SUQpqU6sWKtc3UJIUEzdgHmc0ydqS0VAAUNaFdVpYv0MqeHht

x34LgU6FAVedrjrKzAzoTLQ5r1b92dfgKPFfj1BBAy
aOMuERstUtpvnj/jAs7wV2x2yY4koEavtwCVYkF0LuWh0BkYXOFWrdemTENFSo9MC8L3AKceX928/j5y

LQdpUIoysoXavZDoJb5Ou3rX4xCgVP5UY1d8ecVQSMcVQQuMINqcpvNfkeUdhUk8n4gQdBxmdEzg2sMV

ex10HVlzZbug8xy/E6m7W86vyF0qzYuTW0X0vlYwW4
penEOa6Fk1aY4SLaj2ws1UvCdu5zi+yYMBgSBU52E+Vp1BCN0koaCteoybQb4qHLLwH4hPN9U8uisZ76

HpkW0lXrBy1g6EtuW8xf+xyQSyCHu/LlB3fSvvqIphSltN6SCJxYr8xsT0i4kB4T5ttkEzCzmBEqa2M3

AE5l3eAchx6bgYj0iES9N8oXwRGzz5ylUZr4O5dnIy
jD7t4L+N65hDbFwwD0wP0Alm195sm3eBjHsQ1WvXnbdQN2Dpt5Snog1nels119a6qdsQUuU/0GDSGqZt

cckLk0hTG+VLu//YOXeEFHdnyn7zNjq1h2kwqUHIEHLO7ehDgHtr95bv0GoBDwJlGVRgsLoKkMjYtVTE

3aiz15B7T3Lk86tsx0e+tqcCQuRyypuyaMznFQzkd7
o/7jdYJmzfNVo9KO+6yfPIDr8P1jgfdWk9o9T1ll4LYirXgONzTytyfiJdgiQwvXsPi2kcJ881j5L32m

5RDuX237tZ0ZHpL3kvo83LLjAI2+GsJhIeNHnyYvIYnMOvp4eCL8I1UEYL/mOAWuI4h8e7zt5XO2EIsj

lT4Zgk8rJPqZl/wrtHUYszSqrQdEnSu3vX/PCufv+d
2jR8g5/nPoYsCYk0oz54YcVnKP53PqEPr4Yc+M13uOHC1nuBgYzuh64G/rJWZH/jUjaSTyuvjyPn15KJ

CkqZ0kt59pVfQ99y73Qm6nMMKur1VLftdTHxMtgGtMpjqh+xevY8N7CxmZpExGvEOfjb6smapCHQ/rD3

n2Gcygrpeb+1f826QqXRj/X17TNhXgi8RyduJz2sXu
boqn4+Zma9y8CPj9dNxNd0ZG9dt1dr5Yw697FSaBmnV033/4z5ena75+dwoPOEYA87CO1OyzLuHtOSrt

KKqyF2YEC/Wt/djmclIYPHQAoTWdpdwCIExq6J/OknEsz94YdGFCNPNDn7ZJKujbo8y97q8IofP7voQH

DBwJn2kH6EHV5zLnAKg7tGEUOBo8USdpHPvN4x07+V
LLEP7mtKxqoFwe8f4+PN5JVj0IQgc73+Ux8ZbmAL7WVAw+WDE2soY2v2BIixS3w+WpvdFPYP/FOiQFxp

r+/7kHEPByt+NYJlY9G7e/erK5L6QQZ0CWz2msCafmXrj95yJQOq4U4ABcrg/IfzRD/cvBI0Dc/U0FZP

y7l75mvXNjUu1EgU1iifkY5HvoeC/B1FYn3fcdi3m9
PmdeL+jTufD2YmFSOJs1cPKlkGZ6U7S6Sy0fp0GqZ9J7a+3fXQsqjF36vmjtMojAtMjgb2uH1zwpHNc/

ekimtyeYLYCitW/tZVV+VFA33BN6lHJQcbUzR9NjhkHzYQ3IDE3FZr48PkXUu1j2jeew6EHSW5s3rqJ7

cGq3fY6PaioTinunPkeoT2H8Xrx4fBw6ls96jOHMrd
e2sFAvi5sbzMqjGBeL0GUJapxzR2EKkSxGXAgmAwUZGpp+hncruyfXsV/S8+0OFiI/yuRkbH2iOXsPpo

1zsZ4g51tx8rqre6cnkYl6adjsNlCc5+TNZjXQlYJhWKC7xL9qBSkmu0Ctd6hMHdX3Pf7g/izoykp3XY

O88xREFt9OI5j8q2OnPFdoOQXHn7MZzDrn2iOxkvx7
/NzA5z09XA4a/aA2oxcivjLt0jvUzoS6u0YErj1tds70A2CizsjoHH6/m1Cn5B9IAPoVR/zum4riUVPg

zRBVgXwBeapCDVuREl+OT10zfSX9vU28Lis8m0BoFfArJ9oyyLn+6oEy6UggaMlmK//65rbkk1+UBmqg

Te4Gwnk43CPW9TWA6dDNxR3RY3QPL7QlDxrtnejcRa
ly8rdAnnwmpxuBkVcrahgmCYHb9wStWuYY7Cdyngczbcm1w1kHgh4iKEOE/mQt1ptFBT43M8WV2Xti2p

XclS5J55/9tf2BFYZELqKz7yeG9CU+DgLw+TupajLCP5R/i7rNtL5B4DIYjRLMklZ0ysGfhTZKF6Mpys

jJUpQi/QhCT/OVxjO0AoVpRmDPJqdYWFJxuwR6czEk
8nyRIQPu6eqtwG9tfwo6nRgANfvKbr0AFEvOKChdOGkN6W2hOg7mw1JqD+SIquap2gyx+dDQnsi581+T

7Nw50NN1Wu5csUy5vvbMf971sR9BOsu9W29F1AIk3roggmXVMcklfsVKyZKSpyehD82GnJZHDAOUDW9F

SmeG+nD+tR6XWS0mBzI+9RdqDAY0JVKAL5ii/rv8Co
ddNxmzSp0hyRajEt75QyLZ8J3B38u3SuxOadeyBblzoxKE6rpgbYbqDfaG7rdXlzwRVv3gYiP7QFoYcj

1HOd+zbxFBHA3CQlHe8fGV9eEk9o04czp5t1qy0r+GvRAyiCe5qZ7baHqZq12gyt2mpwa9qZWRp0Gu3h

+iHSmu/IJvOl8PvGh66RBhAJG1G3+ENnqY8fm8Ky4C
C/7gjy0f0ynq//5rugI2l3eKlNcbPTkqVCUDqY5hkN/03U/gEiblA9NdJ5g3Z4bC+KsgC1B1JX7Q7vZR

JgPiuYkxaWkcbbVDArPALgVYd9S9owniZaCwMaBrjgwhWJgtn6gGQJiKL9svlT/wQ20oEYu6uZCyo6eR

oMxUc2rJdS+gR+jCO5VO8cjxkDt6Nkvkvack3KKgKa
vta9QrYhfUOkG5G4bJbRmcpHSF52D9Ch2RVzZNkBZI/FN719VMMGOIUWzrci1AYbBWn/LFBhsl7Z6NEw

ctxxQ/xde/vrbU5C804Kj0pxHwnQfpBwKLvW3yst8d+373x+FJhh8QRbPXl0nNOUXjOt4kQMf/DmeaZ6

A4xHde1DhAJHHYhkrKo2a1UGlfQk3irpAhA6HptPgL
wyp+Kjqzs+wX74ypVwajZNh2d16cSQRSWUew4o9FM0wOjgnb1xwv/pORSzMIRYALWnxTrxxngjCrD6Tg

dDps33i06rpxaZc6Ci+/v0g4LwTR0ezwbe+25WvMMdmy5hBvp9IObQutswVVX9UoqGgzfszi9aYI0nDl

mpfOZ5GmnAALUqyb2DBGwSvSj4J8zV5tKrjsbjgqB2
So8TA2G2YTMb/zXyq7lxNBiJ6lhskExBpm1hAf8nfD0BVby6C6JC7BnhrQ8MV7GnKY5hSTSn6M7Tdv/8

QhbKHI8wBfaSsETD8lvqm6XhHiuwzyDMXFlezFUifCTEYcxcPf2il4GVsaFxPA+NBcjBbE+ui0Mprmj4

d2nRm1o26GpwMaLFWTOl4vQ49eOYts13yegaz8vYPn
1Ouu86lmmrOkD2fOYxXMysqa02elNJcmCUkWsm65wvfO3yMIt5cQ4o5BtfJjdm+b2vgGbbrd8be1zlH8

qfbuGGHXuMVhtcNNeagoXtCKKEZGMCU918QsOoVDeC4ApgZk82RjaJ7Ne/CUXon5LOOoKifLdZsWc0U4

wqoMc5IhdOzfQ50Cn9qIhnTZyqaAV27vxd1j9Z0j+I
zQCHKugNe0AchnzQFUf3QQ344uW8/HTfzXSFJJYggB31M1JD/dHnWWyJJBHua3iKtCs3msw9jSLLsYUI

ce7PzBb/7qiwPDxiFoUsfHj+Yw/2ur7xDV6nN5bb5VkBGgB82ASNsty5fBmLOjZTUA0MSmutW0nt0Pe7

poeDnQKNOmUF3jH7ogzO8nEtBZivAxkd8ItVlmLwJF
Q3hiQZZPGP1/Ws4jzvO5I6XJOnWzrnWT+Ip/fRd4rDIgLt0Nf+Jv2Ds2n1YToAzocc1bgLdfzS57Ru3E

ic//YtJuJ/mOtQ+9M2CalAgugZ0ox1Vp1b+dy97ow1XFoBWrIN819sSJsYOgeDpKRNpRJhlXDxU+Dsxr

2gY+8zq6gOzddWN/pkX3uwEGEt6z//Zaj5X3mIKURg
wyOSF9SgoXcchKREa/j4XFaJPz1Cq98ligUnryjIeTlay/yZivp6csmmfY50y8EX1tUs/SBSGHHt01lY

QEzzoUU5GcKCSyM/hpYLJGnXM560SUiz2qnWaPHo2Q9CHi/HZgmWkhFdyYisSSsz1OLQBWqiqq46X231

RadKsEb4JnxHIPZnjAQI21z7Qd7YBuwPRgHkz6xz77
B8F/qcxR1M8y11XsCGA7WV2PFN9hgVo6YHcuDyKssQsN7z88kUBwhxTokMQ/mIvvezIkWNtgXteTYzsI

/FnnK4gKUcqCEkC39jmi36UkuH/Vax//h2lZe+O9ufzfBR+JmD0LsDkX5ktJ118ii2PAuygFnUnBuhTy

UL1Z4mqEn1fBRA6nVRSeKxnlzXsqh9BhPnp4y/ezb7
XNpSny2apScBvz46DNkyBgUmZp2sJ0iELwguPE2EFmLJ8cBCalByP4moU4ycUPZNF0Gf2PSeIzWXCVdr

sNA4Gp29PhGgb1ZphEIKzyWNZYukdCgBbv/p3F62cGMYDYd4RZpNZ5qzirsiFgBiYpFxkxKamoLPVJSY

Yh45qxJCIkK2W6Gf4EBNRzepXPBFJUM0A/roYRcb8B
+OIYWq9vNSegHg2QxuXp7RWHfn5rbluBv2PbxtDLaKJ0iB69VnmhYvciQMkA4DKJtjaPzzTTXliPBs/r

8TmLDd1trqmLn4M8RQ3za8wNbJFgTVuAbNHGKCW+X5jGTUk68zvGY6kPX9F/XeqK/LoQog2ZHZEnQQb+

hW1YpD2bZB1zqepHs2e7Bxz82Jb5b4WzSJtmwPrZTp
U9DQgvB9YLyRZSU+oW8aVtAfrZsd9vYaXxl1lCyWvz5jA2bobvTB0wFo+hrN740yiOgRuwGYaSGeOyD+

DxO9AYf/MnnJQPWvU+GxnYg1wLET15cFDAVshmDMu5sBIMkzODQded3PGD/J3/P0PPYFmu8Phf1njSSA

wRpmPTbqTbbVqN+8l7ysN6q3ckMMUPdC+EpnTqLe91
8YkWtUOT4VrWcNbA9myEbl81LfSE8N7TTwiCDgn/dByfNhnSTLwFmmFyAchG+q9CUMdfbAVzKCQg1U6E

202hfTg8b5bvaZsIuMHiSS+mQXpjwjDTw/Wi8jj+GmO2fQiwt8nUjA3klv1AosIa9R+a4LzUhmZ7sR75

ccurLIAc6OvY96sL5rBUMAgkm/rcQMD5pwR8QK12xF
sVuj9bUL2IslMq0R9Z8WMiveIOspUq6nbMVTUXgCor//DIiHN6PKcshGd7znj48+0prj2Fkj9EU8wmX+

xNT6V2xfZBn0zNaoRhBjTB+bYDXRghaLrh82bMQ+wxhH1Txvt9kLjfp6JBoU44Rgevf1SxBKYe0Xmcir

RwNgq0huCnIb9qU3JOJadAnenmMxDV1sqiV8DfjgIo
QDvmvCKtw0lEka3JugRk57Ca9GLrLzJYuaJHd2hkqo28emiDYRUSeBvGqXVqDMkVYnX76cTt/T3htptj

0ODnRaFBvpXOfQqgPk17nJjAZpg7vwX5BF76DPYX3sSl0e2IOAbpd06KIBCb75cph/c0g+HxnnwIai9T

cX2Kyzbe/84ptKFX0li6XyKw9qC4aYSBAn/fVDeOo8
WRIGuF0BwHMgP/s/CQy4UU0h1I0qEXIpcgRXPJL1+eBXrgt506AHnu8zc2B+Nh5Yo9wvo/oIyCCdvNXA

6S2fJDQUCT0rG8NDbJ36RWtWGhu4r9iu6XthGD9xgyx9NUDXdlX4vJWCAvM9UIlF6HGb5wZh0V9jwCZ7

HGe1Cz9OHoDeoOFxb0RGmmw1b100xDnHO1w7kE8QhD
JP230fJlV7V+UHLuXij4gRbVo5tZud/ZRU9YmfMIdcT7Rv7GVSF5u9wvWdR349mjez2vlflvG5+egpEP

2czJyapsxntrG0pCwl0V1EZoSaMofEkIpIh1keV3wHrVBupnei9vzKTve9RTi0utGmJor5ncpzjPDV+8

dj4xOKtBpYmgx7KR2DgetWHqTzgNuN2Wez2G0sIp9d
ChRpV5FqKN6r2ZNtfahEpq5pswuc3TWXEQSMCbYH3W/W8JFu9Q7v/7oE3Q7jTei9oIm2/7p8EhLscieY

dfyDp5mwd7q+9Ek+xi/ODzdUVxmwcXe3dejBPTWKHIyRugaB9B9bTVK5izXJbfr1CmJqQODx4lpxb4dH

xJzFgLGmBTs85B78jthSXKRA3qPcv+N1CJD2CY30AX
dhjbyVdjSIwq7VDRam0rZ5gkx/Ba2nzJvssC0z8DUGTuKJZZR++ObDhCyASw1vNJYfbr76lDQe0bK0d3

ko3SVXxnenFNS6glp7YObSBkG42t73jo8Kjfc+KkjD6XcxUDpvLIVTlcT+Fc01n3rFjmPA0PS6+ppub+

nxuZDSAkMDkiQ3gGK0QFmJG7D+sRsPBLgq4CJ9AiSS
vacb0Y9u7S675ZEkQuYPYA5/OCGT2unroL8kKxRvzIKBcuSKmI4aR7T707Gxd5+C93gdVIhJ5YCCjJRE

zfxXhjdILAOZtUfQ1DkmmRgi4uviwGAyxTLMyWEAaxvTqwmX9iGDgI2h6j+HAr9M5ThBqFu9rmKclpG5

AUw6tedxR0Khoud6+d/MjTlsNhCneMlvxlkjBf4RjK
R2RisL6u7uFvHsKYFfvhsmjlvRrVfOzZHwpYtdpVkxxDHM0vXB9mdu+bxlr1ViJJAjHQMnF09kulX3uR

fIJHWmzzdJ0mgN7dXrzDf5Gp90IfMeLNJ4HMLQ5K/f64ZAGQ2WpUPfyXjZ5YLmKVCN4b+NoEA/9+XYZz

0Uvnlgx2C4478/5EkanQi+EkArfydO1As9+A+NuOTY
DxMHNYb1O1xX/7ZFfID/1eyJQsz+DXMec0WnklPnSvL6g/NZCnhfswjMQLyRk41yBlGjvjpla8LA4zH4

Rl17OGgUpopJZJKHd3X37e6Dlh6CE5wj3ymzdNopp6lE3cZmpvSbZns0LiH6sh/dCnWcCfcl3bhJ4qrG

rWaZjjnqVubIvQC4vDF7edgtk3qJH9Z7ai5xXXkSOc
rfKQ6OIsnHG+YPKlI+szDyuo6Px9wFV+Dm/lbwZOOR0zeHZnyo0DA7wp1No7f3a29fCpPHx7Y/dn5Dry

hxLGpK296ydhPmxqN7VKogZdWgNy1gvSAaKmotZjQxwHhPL9yWEM/b20R3X4qj7NK7SB4vPD/lKY9Nn3

vex7TVVtznsGyo6E7zlEo/4kyy3UOC90wEqN+trPch
0KaIfJVeZbMB6zJBfn6+YCen6Ncuv7hLaDopyzdE83vYZ94YzJgr1Bg7c51StoTYDoGLtAthjRD6ul/M

XCblwEP+Gu0t+O96ZNhxMeP8RkDkK4wtVfF2eykrS1JiDHMEXREnWNWYdmpm207Ep55oda5TsTnyPBg+

4+IrZUysNHMvx9EscM3vn7A/uBmbbd2BTIsvDe14rV
jNS4j11K53G0wYdkbMvKYyeZypbnMCr4hSeTInuQ8+HzwlJZDCicMdrWHsMdvH37RxMqusnHtRr0dJrb

TRT31QkGEZipbBUHhjL5A11WAkj/NHQG3/qQe+nptt26nzsTOZhadzlYljw3LOGm5rzqGW5e6MF5f29g

iKGAUoieaA6J1eH5IUV00z2rPZ4AZEyuJ78LYjEUte
tMo95CXPtwvJJT0JRNSlaBB9RQHmc3u+VTcKY4S4RvJ2BrpeIM8pl/tV8H0pav4l37DsSMuXD2oKWGiv

lqtZFRE+hVrpbyIgiU2c0v9kH3e7xfG3Rb/9FYXz22Wt0+/H/viNY1u5EBM3sOiGoHxm/+BlOa9X1Ijy

r3pgB6vOadiluHDIqDOvCss02wwKzlJH0IbAmsZryo
4taHzTWI8W3JmUepa0YGfcowmzvI55e6za08PD78ikz4Sr+qhaHWF7KDuzh0lpaNSX2Mq3zw8oe6PVRY

8IpBDDzg0VJlDSriPs3pa7yqOS9OQ5canG0mAXK5ZtcqQqu5wqc72yL7mzyOm/fjah3eb7kUJmElabmJ

OH9aZ1eucw8UuDJU5yttdOIuWndNQqxoTGwxnTFVyp
inN4l/IGcFz3emaxEUqNQoaZwtvVLOBxV3i87y1FzToTo0mIOFlhEHaxHfR3ZEEozwN/4HISogreBE4g

YRROl/hp7bMlAUZV5gtVA2Hdf6f+yEYl1RBqR53ZtW6tdpaIicavRW4XkA4SZrKSOMzpRo4wRg1D1yoF

o7lNJYOgdfTrpxGCJ4gygyeOX7xg19ZnqF99KoE3CG
vnGAy6kgt5KERa4qTsCsU4fU+QeWU2Sd6dI9H8nxSszMEoxov7Dcf+8a80OtZtSrhyZLhF2NyGjNrmES

HlnKgdpRn3wsc3lYHx0a4BidfVeOyDlg5RVhmc86IgcQ5chHvIlD4NOBAfI1bd9fwM0ztpOdZvOa+MPO

0IA4NBuzIMbP+rfUNu7dJKUH8XLrWEoJqknHd/7GGD
ovJ59IAFBlc2cFlazg7I0xiQLXLK81wHyRK5BOZGG3rgxHOXNTzcvtaITwJeGZdOcsO7vCXO7ylO9/TX

j4TaJbppHjJXVq8KAGcVN5usN1zCt6Ka0oJ2ZfFa2N44r/5csJW1pCr67TAHvuNVzGwaASp6l0Xmgg3M

pX1W1oOCiWT7KxBuVB0D5We99e90f1hUnprJTL5ji6
3YeVSCHpoE/W5av5wfhtZsnT1OiQz6F6dLeW8pmRPqPgzEtNmr6SGu1QLDsevgljkTskr+wEqmjQzbiB

t0WfpuPjURG7c/lYSOyxFiTAMyl60+8PKcfgfMeydISy/dcIFrh7QtkzHbDSIh1Ni9vqeG/rtL78l2PN

0PpaptCxAgSN3FxOxFPLxxwAcZzb9COzqmGjPFUfl/
fZepJgf0utn8aoO5DM//jKlbamXO74Zfay4aoJ5s8pO1b7qah0CbaUqU3OJSasvETQPLsivLlQ6+Elj0

rOFpPu+i3PWhi8OTogm7Q+MExLprDxLYfpXbH3Fd7d4yDOmkmScXbQ3qtd/nnikbsN46evu8mp34K/nw

wpGtttUikOsyHBvfTGhfppIEc98LMKAzQ/Zc1ffhih
JhIDCdsl+2R/fCuf1INU01dJgdapRdvCzx71j4MTkcSW/7CpPSxWd6PnZzC0oD2DMftzrSqeQjQJtY4e

db9dNjXYdzHC8SOIxhZmTdPy8lgW3/sYlwLvAkEcTKbDh58tptHrmRqSc9hZE6Lj4uNXqlsZtbzbHoHr

FMvLFrlwagKn5TLExAgXfLGJZDXy0GnxJQyEaXlzS7
OJ4mCwV5xRa7nv9Y8pGDIVf81tKXKolg8kQg7szxdYkRhg5htoqsSm7TYaZoTv4tH+LuwH0fxFzwSIRZ

s85vke/8n3zOBN/HBwx4Qhkw5KbtVcw0EFW1WN9uXCP0JrkMTy4XLEqp1cUS5eHMo1LO8laicP900aWN

RpA5gttJ43njtyKJ5pILXHb0S4DdOSX2Z+0RLg9bE1
klccICEZrQPpIUtKIkYhWRS1JtuNTGthGQol5sAVsMxpDeRkKMdk9xoB5kC8DhLS1o45x04NNMZQK98m

tVH2n5+YDD0G7q23erzch2DiEagkcs/Fp4IaMnQW+aulvWJWE9hxIocuCBIfmrOsS2IHKc2+h7R8Q6kS

LpxV70A7Ni4GvuHwqheDAXiCWBx+aW1BIZcLe7EsgW
Jz+lHGGOP+1csPrZyTRxUy8wZkWmIs37eWeLPkMJtjdNRFskk7my1Oq6LaU/HTtZGdFgixIlO2afdE1k

syRfiH5cNlgdh1waNpXOWoh5GrlfDaG3p9saj8Vi8NESx4OsEmsk85adaYDuaUnH3gZo8ZhNHu/2dQ5c

7Fmjbvl34gJ4ojOgo44S5d/o+MaQ0C2zdXFgDlK64N
Hdjc245kWT+cijpuHEE93V+p8+2ck3sFJ+XTvbBGDyTzM4Tf8OigjLFv5He8etxoM4ELbVZtria5v1fF

nLE0P5aw7ogiYnE/bGLVLCExy7Ha6hm8X0aMNdk4pNm8+UeNQwV1m5dl/FEPgfzUdTSBjS9yb73aqEiy

rkqChP/oS0O5k6g75YmBDiMV/KM0e10yV5qqOQEvbj
W3NZ8wsolEBGS+U6Fa9GCZ197rdlmfUiGV/IfRlGC6pNtyHOER5c3orx9iZMt8mvpNwFNBbwDCtaGkSN

6QfsrDV/QLKuf9F62OBTEEfUiCX46+d3NRcQAufudwH9fdI7e7H7AS+EsKG0tZ8T3IAdz61NzZyYonI1

8XqzW1bqph+MIX3aGp8gttvnX0XekkLEew19sV/3vz
U6eXRaqFBYXpni8QOUXAcPfMKmXm8qtGMLwUxs9aHlgIbHLNAQj4mQ6zgQbuXaSsdJRHccreLo8qD8Lo

54To/PWckVb9lQkWC/NdBEQbqEcjMTWx9TXCEVv0S1UTjKw8dre1qOgwFj+WwDrIwinrMXZrcbu9187b

EbJd9KoOt7IKksLUfSdyLwKurTxclPkVqRF8ffRSEC
xn+8G9S0PRtNxq/6Bs+h6uCIsbf0lK0l6iB1gpo6qbG9ty+X9lxaI3dXOK9/r1PTzUI7hnO5nl5+Nd0b

2cfY/cPAc1NKybAJAVlJlQqyukmjA1z972vFz6Ww4Hw3HR5X9TtW3+xycW/Nv7O8HpiNPoMEsqTEcLXp

Iye9G/t01r4RPR4uwRrFDmBN4fWUOeK3f8KXbWupwM
giq1J2+xRJFNMPxs06vQ6WMIXljqV8xIVf/gz+jEoONdCEubVERTcpt11cEunT0eglhysReZLURPpKgf

TamDEqwx+mmFaVPm9x1hPuOanjVCH77v7rUConxF5nWflvF8Bk7i5LilXGW98WgWH+f2gQT5uYG1bfR2

D8uIuP4lzOxtNZRi0YzLmjSKwNL5fv0c2KLjwC7xAD
9rxnrVgJBW7QmkAphy4imsSgw56LtRO1evOEoOHRn2CUxI6ddRetDjcPz9SOmy6f4adX+YGXx/O0H5Qc

s1/TXT8RA0CixW56cKRd8vxBqyzcwrMGmjUjP0ArZB2ITUP1zlkGJ9JM7xclYWNGhEjzkBbEZNOKAl/L

LPYiDZKDM6LggQzEfXREVPZEMrlWyrB5Ocgzbs00aZ
jmxZOsXKuhoNswBSIGdmTmMlWKxWow80OZjUgCqiuVFd2ta9msfDhDp50p6Rz5+RrBHnzCe/tFeR5hes

9l/z3k/JSTT79uxMwubllqzTfhBImonuN6Qx0PSUNPPFwGDyBwCQLQ1ALI+LDHIyZ5/XgFSdS3tOm+EH

mkCnRtV4BYv5mSTEhwnh9tNpb3JRpinlDBNs8NV6qS
otn+bjA4JGIHsPvKAVy+sus5ZYQ7Ei+ogL8Zr8m/YY7KZcz4ZFRGF7dSXOnI1zRRZ/y74JI2GYHfzfrj

yyfrL1l06CCTpQ721psUsdYEJ6Lro7S2UnUcnrqWz0gvcMJ3ky1A+i8NBCfJcdRmBRX6+dYFFVxWgLu9

QWdIubeHuYwi15VsSe7wZyBwChyoIkv9/f1eHfT/kw
NN13US+j6u56hUYzXQiXM8GNPxGsaZ7DMe28Z6Bv6kv8QP8BNC72yqm81Su5QMXwaHL62p1KnGX1SsWR

QgIkloF2KfY7PEHw0+Ox7ohD+sTwboGur2QRltBVgo/iGih0MRI5b6dI5eraGJLk4Mk4Pxm5ppGqma4N

WyLB0UzetHFzEhuL77G7B8apC9h0vN6sTUWT1TUXfB
MU8soIx+IGiMthAetZVgA2J8fzUEfv5HMeRSKEs4NM4JEzV+fq46a+2M9ZvM+z/pY/UeaAhVbwyZJ4mH

O1a2a+RQzb6E5sZvo3ncPtUonHqjIGEEl/Eimkjq9JBZpgeHnzyRkDBt6FGhltwlYoyzUR2jpPbO+0gM

N5lgEERoorR7MKmsi+7R0jsIgxOVlKGmqj4vChIJ9N
lRKb8fAkYLPoso8rQJHPahFmrWH3JKHRGNuekfo1faHnNKj8msA/mizJG4+koB4EOs/PnzTju4D37hMv

6R5+xNCWAxwvsfiGBNzgiqJpn7ui+5UeR3kd7doscvVv35JzN4Rr+g+295B2aWR8/hXrrees23U+zjgO

efnaMUouuSQduvfbSCCDBMGQR9JmNdTIAOzfylxF8u
9vl8uGsH1QOn8XW/VyTeehKUXRLYNBnNmKrfHiYCs2xUdwHYM5dFAC9l5uvwlNgDOnNrc3TeGLQzdFXU

hQvz6DsI/ZF03bMEGsj3ETyikoRrsmtzSoTnEsHjzZ/kwFk1dhdNfT46XPBiUk/e9vFaVTXxIv/xX808

KGnvHeBU6GBfWhkKe0LjE19RGlXKJImQ7BXLl+s9zx
kM7PZPIrfEjaR90wE0aneRRQUXnrmptVfhb+G9+ECR2bQXFylCNxHXM8KhmALslLugav2QG8qVCbMFxd

bJ0c3RipSTcB1oyvjJv/VpeFjLleo5lZYuePI4JKmNPOR6k+KhHFMrO5sZmugKib10egKn8a/YPdCW/2

GKfYZ3/mrj72of3Rz1fdpr7aHV4g/XQg/YW9DjrWKl
dS/93ZsBaUNlVBENakrzmIMu3FKcEleAhbI/sBFgsN4G9pD3FhIguMMzQzVMn2anZvQKfvAB6DDOH3ch

O9lEsQn42rM07j8CZTFz5VdRAAfXOlRrhgt8svBfoB2p4QIgIFuYn5MS4cbUiqsDJVC8V+jnugKTb3Q+

dErVx/K//VEuJHWxFq+63JrM8nitauBiueZmDS9/HG
GJkjfFuzf8yG6bFa9cAlK060n8/NyPTYuk+M85R5aU2cd9OYVKBViYdHfPwHnpQ47uI7/73nXuF4Dg1n

aKQHCc7wZwtpq6aztYGATwgntZkw8HgvdtFitz+pgMn1KEmxpqG2qxlRMNPhXXEr+mRxgE6lFA4AVA82

Jm9/TbPK9qTmt6qYQNxxXiov4cY5zwH1u4tfr/tyQ3
IVAm/XxKt7efGv/Wd30kjcdLsC8Zzr7xOfk9jRsW6p3rBkNwUPRL8WtdUcXr9mTY3ElYPrYcDarrafwN

dWHKgAwMFcocnIThI4Ja4nwzc+qt1lszIe8irz5zit32rbmQRDVnCFUQcCo3NK0DM1JeCvoU8Yy9BGNN

20K314kkc6H8C0lIrsCdKivNHSmFmtgMsmOITz7c0i
7Aq6Sqw2BmKn1K21ecdeiPt/38w5EjpIHuTb3NHZKjkMX7rxGYtMK07oLUijrnuQ70lYEOfDBYs/SncH

z+vMNgcKrPllt5teqxA9ARj5N/x4tRLgS08H6pQU/mP4S90J9EM63MaS3mS/KXTVubLXIdqnE+fHXIJx

ltQVyKpje2SbazuWIsaRSn79ZsNIJhljAODIYssUl+
Z7wQSV+aBBIDa8UahxccJTal0LmM+ZgrZ7Fsax3rVcRErTBChRkuY+ZDI/n3jL08z1n5B74+SWMnzql5

R8Ff3NylmaZeb21VsiZxPvrACPYEBINcWxDxIZKN2c1rvIs0gLqIroag/TC96bwJy5H/7pjzgkJnvObk

rd1kuh590O8zZ9LRcU70aHHZjwtI6XoyYg5lQrmbLJ
i5NqRjNrzHfRpx8torI8Qw0nMKuM0mG+i3OHHJV94HNw2B0H47WgOoY1GjYiKdabaY1KwzQa1ShCAMAA

fvvVHkc17dVtNPyCby9M8h6L4IdJgkxOyuBk3VfgURaAksds8mLx/4sa12cA2WzzhhVPMDFze2uBtayX

bctqnvD6Z6b8no9m3+zj0FGRbOqq/Ugt4DbwxbvapL
h3Uw3+PryO/FtZNrYpsmkcS2bftI7eIOP8sM23BvP5soT0MIkXZZodgKppMFPKUr0IJRHTRUb8Gwm/2B

peNj8UAuYBXnqyYkzxwvgjbhemjaEPbcZvv+4YUEq26t0/u4y5Cj3xHwb5nIFfAXtbGdVQbnTaR9yTmS

hFZAIF5Baria/6xK2Yaptzl/c04DYu4q3eMWUObW5M
LOo08P2IRFVoejamOyW2X/sblpv0wtxHGzyx9F9Aify5hhzBjt7dpC/PAos6T++s4qX6X+DGjQEjMubv

AVcZQiN5NcnXYo+JVvQ1D1LuPMM1+T6dUi++HH65iwr6Xj6oNEACjUc3JWWlJn26R7tvio2Dp6z91Mic

SMjYhB8VZ9Vwp4uells67um9+Mk3rcdOfsNdA2o4ZB
ELNmUpa47/j40duF1/OeD1RUD5Q6dgjewfRIOQaHur07vT+r44kKVrZWVsFfNQVvPQ613+8li1FLq9kn

oGUp7SLCXcP9DjwRm37vnFLFAjcn6H2aC6sq769iPlCnu9hCVZccosI0YIjHXjh1I8hTfzGeId9vU50d

rLNvq0z794MhIpuBXj4VmNg5hZVn/Msi/noT0skabL
tbcrZ2boLjltrrvgjm8F89pkBk2OxH87lBuqNwSPasaI9JVcwXvz/+mTs0vV12dgXwbuDxv2l32cZOH5

vhXA44qlCSaT/1xlOzG00VOz9wg5cWtxuAAtWUfNr8x72fmjj+M8OmtZH3wkndIKTupFhjEky4FzulZg

+ipT3o46BdXJbwCQJ16HHbSO8PR2YCPtF1Sac7/PEl
ysX3wwv7vXoP/S1ivXGL1/W6H4gr7zLlsm6uCQt/SZoXzgQe8d83NG4MKUrfediDc3W280YpFPqNyNGi

37L432RYfdXgLIKw8MKT9h3XnghA+zb9e7hVo4WRxSDQeBndn5OQDC9tsuw+yQIpGs6XAMZBqRJLeV0L

RH5I4fUwyPWDj4cTv0zRDWBuV0s1Ik3Sl0TVhkmpi1
QlJERtyshoVltpqrak0fDTGC/vuFg7rnRRFqs2eonUgqFeCLMTiF04ktHf9C0/xhoQqqiClcgfT24BK4

wZvBIo9uB+qR9knS15KWeiys+Q2V9RNfjAIuaX9kzpPI4CJ5Q+zMB0SI5rs2G8HK/Lst3mltzuGcRKGE

jPL7IxxoSDvohqh73/+aUn7YOg0cdvSDQGSN9oJMqD
k+LL8g47eqpLXlPR+7+advC/2n1eEzIZdGX/ds+n2mVbwixXOssSP/8+W4x/GezsfVlN/T8//MDkXB7y

1r3y/lMXcUiG1KabheF20zNbTfn3XueFPxl0/++fNPxv+I2LZEBf/u+PVFx126kI5+dMIbrWJnNd6FOT

khOnM+l/IvKfYIXp4Yc+3/V1EXm/oKc115NjUaI+EV
uju0mobFzwiosdl/V8CEYKZjBN/izTr+9iM/QgH2SXmzYL5/RxSl+m1RnqMvKg7S4M7sCvLZIl034G7O

ry8tTvLT53QGm+0pss+xQYbTvAxnjPmhc3iAhSjF6/tyTjxIHIVYekuj8D4bWgI+rCEz4NVymW3dZTEm

Ef//CoGQZ9lcF5wucrgqraNYeLAVag0QwhGbWIwMY6
artkbpKkgEZtElLLZOzyn/ZMR4tvUuQEmc/GnFp7yAA+jkLPTFmNY7iXGyfjJ/bATKXWzjbXDCF+Zcev

+3QfZC9GwlzEMdgj5HXke6+rL51X7KvuLiPXdNTcqNz2nk6Y5mg8Hd5/T1l6Mlke7cZPNy3cra5Kc2Y3

5hNfI9AmDq9Qohc8SbmEpLtca9nTlJg0qPk3lvYW2v
LYkT83jOedWOvKi4pKQUZ0mL8SqGpdXzixcKK5RcNyb/4IKhaAdBbAiAODOGVYJCJdFe+NvChh2rUE/3

THU2Pe8vVDyrdqzvx1Fj9B+BOrVwKYEEdoEdzJrSgn4oxFR6lniM3yXFRdrc9OLf+Nq78Fb0mua809KV

Pq/vCjCiTBkhBUaC92APCG3dXE3gpcndvufuO0417P
7QVRX/gZf3fi/clNoUkNG1cEGXWkBVG4/l3Qb9fcpjD5+BWDMCqYsLbD32mLGe6orDNnB+1AHcBLqquu

cHdfs66NCw5e9rn7x+E3DFYV61B6MZBYSH5z1wuKfNstL+UC2oga8FqqXU5OAx41LtO1f+fVW3NgBQGF

C7iuN3i1bovXVA/UCl/Exiwu6WvlL0cVXet1zMLrEj
ZtSiorA/1Vs6W3NTqMlTzT+7aVOwjjw1LTp0khLmybxwhNIrdjuIYbe0/B1bP5b1FRdZQyWOjVAQAy06

eUjhPSsZwV3p22D4Mr3s0AsIb4egi7BDYelKyz+OXavvK5gJHPzk6npQbygyAEGizPEOahW4S4mzw87U

l/8e1ieuJLKEoHC0aaJ8ouZ7W8XGPFgMb4TBAhKF1W
956RkIvrJfSTzM3HwXWxyult1OZV8qR8UwEOefpRRd1men8NhWcNyNLrkvVC9ibwQCg9RCyPveQElpsT

cGgSb9q+2bGOJ16FOzTeIbVhpLaTD3WWSjnmW9QslUC/5H2wHWjZU1S5sGIRIer+pfw8xYRHFagkwWJx

KBhAusCMk95eBMVKgdbn+ZCvg2BCjupVKq3vpRcW/d
+ZMB35Qhy5r8OW824r9XB2QNAUeNgQiykN/a3uQ5/tjwSTqtd4RSJ2AvBeBVl05UhoPZnLhh08PSHGT1

lxuKEAj2cw72RB2r8P+FOU2+slD/YwyAFL42czrRUFQS8Ta4kiLxeUHkOykJGmd7d/8+Pz9gzN3SUg41

EIOgSEBnHrdmHZt2QxvpSzDXuY8o/oLzMFBz5qIC5a
OARB4orUu6YI1bLjx1/Cchbp7c/JSV/AQkHKD/4MFwfQwAgYygjMwDPX9xxX3fhMbvsjkuRQr/UnkbLi

vBzV8e0fU1GwRNZg/sN6p45a/de+Q3H+ldZfLZtAxIlmmHGwZm58ZholAsy75Xut5p7fCg8KsXgn9yAd

gJEW/JAWBOAiCihSZoTpjTbJ4Q0Jzf58eC7Sxz7sMj
pDakc9d4iPZZLPpHAquJDxleNj+1M96RaZ7WBJKIuq/0vT4a476Wsh8tssi0pZh9kCDRXVxifpQTjFfZ

+bXza2ae77jZVSE4y1CrAJY7c7O231Chw6Xh4WWuBNgd8B9Aimx3NZvSxiVG6dOKTjf7q4ggAtzqHids

/hkO/Bf77mf+ya9/+UmGHfAoOGuqPk1/L/lSj6E0LC
OpKWB5TEBKrh+HlBXYNW/LqD41PCKGCwW/vlAVhJAkinOPAT7/Gtx2LNS/re4GHxfaEhIYQpdZCWM4dU

smQtohp+DRU2vQaX2meF80caKJBmhq3EygoxPyWU0QxNrp+IhBJa3M1NfWl/rXJW4sW9DLyfan7QsRb4

nG86tFmYiRiN/EfX9LAbcjlfwr9zub3Rk+Tpko74hJ
514Ae02Wuosqza1snSzAAAzMmTDb716VJvnOOhteVTAgwlxXaq9Kk3tnVo9WCk58JM5LRvvoJLl8y9qs

hNfrS/ORaf1sJZ+9KUfInxHsbEKUBKQvACg+kMrhfXuW7REEE5orbCy24RWiVKSy13BoqFrKC0xePgDh

dqOklvNR9OTdTQUCQvE6cbRxptogGlhhuMH2y6m4yQ
TssjFB2/U0dUr/zPyJaWvuXNg3bMH3SoS21jCZSFAO7dv1QgiBI+l3mwBvon/SN1KYKa1gjAsIKdIH4m

Pl0H4DVLyAbBjd3pe5A6gYJepIWxLqd/rqJUzD12Bpe/UatAEjP7yrV+e+/+iWpVA78B1P6XdW9+gpXO

N4ygawwOJ8cfPDO43I4mBohPlz1bvklaOpv/SEv7ke
lrunBpjYzyhyXBfvhI3KF9lsfKqMeyKl8DTbgCaQWLpbG+4jPZfNRyV2snBuLBraKcvwi93OXugI6v+/

sD6wrq+yThktUs+bDSBKJXfelikNODIAp/y4aGpnR5JBwUT9rK1Vg5cnEMjq8X+lMZSqA4ScOAON9n5U

Q/nXZ0DiXEI8owQyNUrnXZ23uQYZ2xDLwoZAgvGIAP
pqsYCMmvQRWaMrcAnA5cvyi57/hfdXky61ZECNzv7qhzjweZdJF6QPAOJYMm8KaUty4qQdlP9euButjU

iBsvaIMlZSVLwid0+tbIZu/ZF3qeFvz5e2xduep2qR+7Cy1WUvHgQhOB8uS+C+zDf557UQp9+5KGth7m

JBSsf+dx/1oTv3Qe5a8NVRLSmq587I2AqWU5CYJOfK
c6fBOOh3nbgZ9Cbl+ZWODnPQLeaeJ6/7baOe9rJ1jNw85dCuZyszX/NcK7iuzD+WRimpZ5uT2lwzsZNq

zQdx6WPQljDzld9jcFmCIS33984V2RrxDtYB6X0P2ea34mNjW0v0iXpEPF6yX7sqA5/4lHhhiLm+Mz6I

WGKQhgYaD7dUOJVaLGzr8E0Rfa8p04LsM6I7IwKtPb
Gu5TvClIDGbHabKGkdV6OFkqiyJKdUiWJPXOgy4eHHefTvsiPVulrmQiysfBoUdXio1If3cMO5UML0/Q

jDcCz58UnACmjZloNqkDi1CNnn6RUi8i8MZEwh4SV5Cp5igXjQhlShaddGvTH8PNcP1h+vHbildDZLqN

maXesN2zjghXgHkbXz6vuMKD5QVmqESIfJ+WcWQ8So
efTPlc2+IXfI9r+9ax7+x22wJ1adkB/TULcuefyz5daz+emHgkNGgIPj47R1S7pqUXmy5Gd6xm3oq9dW

xRPIGgz5B1+AOttKWnbJ0+8I4UtEMTSSt4EisudaSrSMUEpLd1Tt8ZWfX1Zi5osvrocef9ZvbIDcg/PQ

K+NKg4xH/BVO7ridK4sBDvgJ5htWCrM4uVPiq48REh
+2sQT2WJsZL4UR99r1JI2ATr/qdJ/vI8jmezpkFHSPflhCIOaFry0zSkN9MhinGLhB1muB+jKVUT1VZv

u3wq3OMIBQYLvv2HCs3xhhjPP99bzr44ay9/cO89dWIH8YUPlqH7182GAeEjA2plypYIbWgr5KcblXW6

2A1L1f3E+Ou4+UERt9LIk6BtORmp4copd48Eggd90X
WYTL6pmforpvQ31WxYYJkycZ+1C2SQOmLMENuXo7WDjzo06DUiOikly1i17HYc/nUwYAjTw6931EuqBO

FKRNI9CyiqjvnyWOVcILVRyG2czlHQEqEcl6q3Ng1GE7B9EajT8fcTW72pUDepRtKdzooZOq7xNIhwzS

fKie4GStJ4FcR7LqvgNf+HXWUr6iUFSvg0GFFgxk3k
/NvmkxuiUoW/x+GAFE5oVZg+ruPXCygkSBzENoOlQYnstYt4xjw5Fl0m1eAQMUOneHqFvOlbcVWtTe9E

Ip011b4vF1u9FEmKwCIs3Avu7TMk4vz7gPmMijOduC/YXn8Ua05Kk2pCcXxR0FH568ryD1LRQdVvpeSI

yrbaqOPLWlwTG/0ldrJvr6kYIxETfFa0IC7qD8QnlE
5pIpkl8vnKJWkPhtmKL/fEqo8L67CQklnClOXrdpWtWfnOPi2hVYrMJHpoSycoeEXhpiK2mTSDUQ3/Ml

e9safFHdb31O9zz1gtuKAenlUCuj3JUxPG6ImSDSbQroJFhMXgB9CR3OBgWYiZZbwkI4GSLLyXpc/xEZ

Dg9iCN+JeSq06ANh49taxyus9hDSoiUReJED5I9USQ
iVQ5VpPyLx9QlaWZ4cDuofB0WW7msaOoHuwHDTRFdfWlkU+HEpimawtgKJtgIqZFs2X7b+x4U0o3ePwp

74iM89eYrlR4B3FUpB60/MEuTC/6uAp7dRd0Ce6lKYfrmw4+PlPxbD3Rt2pBGX/2KN0XL+ex0WgcdWKR

+mZiSnKg593x1QgmsdytdZgephH76Gz9hbpRw/i0xZ
uOMTuZalGkC2a6+Jq8Svil/NmBnS67Vm6di8/bzS20hhshkSgqhNvT/GV+ooziQlMjsBMutwJ5HY2LJO

h58UXyWzOmOdE7d8NlinNCOiXuw4DeetB4UxzGfcwrjYJJ1icg3paCLOhOoxwvU9DQN+v5TPY/Av7he1

C80D5TVRtwA4BWWwKQ15Q8j72Hq5ArUEA6H42bYNJH
rZRE6a+OeQ1bsvOyAEmlRccHBh8Bewkyranw68478te7ylETwodxTWNcB6sw0nHzQGkSFd9WWywJaoXF

qPmamCrauv04sRHjjOOInN15E4SA/tWUZewJEf+LashxzmUicWosPEo9nW+MWKO2y736uksYJMSkd6QR

BznszGMMkU4tok9NBmT61Adcx/hIWJ3wS7muRoy6Lb
kL52B88lcWUscyuFzeQQT3RUy1Ojt4KgTdt9qLpottG/Jw+CXvmp4Q7C9Tv8u6BrSt1geC1eDyzURsl/

gajpl3oy/9adXthBxKor9bzC8sKA2o9ZLijZJ7OuAaMTVOePfOWPu45C8PAd41w/FoPUD3h/2Iu2u+g8

qCYG5CXh4M7EHHR6G4IH6N/zpKyJygnJRzJjytQ1IU
JmqgXNF5YIyoggyX9g83czn2fE4PM5Na+luvM3xSWTvGE5R/efn+qXzl1x9Slod9aTr+2gxDmgTS0EVH

/ziHEmGo02zPNZHMfIeeFjiO6+uk8lNxbfYjxnIW5908gfQLAQvSFChURJ6pHa9+ZUJlp00vDYZuWA1w

KnKj24cVUM/pCqo8gJFh3HA9CxsYTnvGwdKyKyIWM5
n4xiIi6U7OFW9v9CbrCjHdqwOsn4w0V8hjehPQOhmlei5jdjzgwsD3NBrBk+Hrv+1wqngWnQc1wwjMtP

gELwb4JkyigfqMjrwP92S5sH3zP9kic5CXCy0fiGsVgf+sAOuWfqelk0Ew9rVYe7fIS7YCUXy+kZHGOB

DlSQz9lf5bqkh8cpurjUB+5GSRhcSjfzZ/e6rIK1v7
QZHMQjIIHQ79n/Jtsap1ut7C9+WfBiAZ0JiFJ1yiE5YaTlY9tp1eDBhnJQMyxOi5q1cwTjs4FojvohFz

sIXcT1VUQwr1LheGlvUo+LWnyhdoAGK8p3JSbY8SRdDqoDm95FcmEtH1GfXyJvc7S02b5ehDO4CT84Q8

P0D4ToLI8UJUQwcNjOTHlvdBvMHBd+7HjbmnJaVit7
SsNQT++DuCXVwYqZom66bO02NU54he0msX4LWdY9sVuYnkbR//tmBwxy4Xy+eA7i1MaJB2AjcB10Ughl

aZrFd9HdSDCqPDy1/fTllymQYLhjb2AeCK5r9xgc2Tu9YIlE25bmtuCB52vxhC5FkJBEHwyqy8DMccFb

08FVxUAsw8oQ1YRGZTO8Ab0lm1qWr55Abx0A4jufRq
LEGMESFh73QJfCQSV5BCZs1wlQHUbBw/IgDtkrR5/mZS2qMkEou0ARBesBqzHshe2sF2BR23xxMCoGkK

5+IPW1AxwM+P1ndcNVqKPIOYmvPy1f14X1JmLTszk9xIjKD2enRcxi/MWRQ7rRpQ3E9iFOM2NvDSejKD

xD38hXUei5jfeXmHtBgHUurE4P0CiCwwV0IN0/Buzo
pBCbI8RaI+c/1WjPQ7VCvqa/LoULgiEY+Si587W4HSaWmvlzqIuVrmna+Fuyb4tXrvek95NOaXB8cSgA

/33xBTvYzHhix/7jk6A5GieqqA5bVEn9Y79JJZMA8I/YWQ85+vAfWnsszY5Go+HIZR6tHnirYwTEd/zg

p3hVUGdHBFs2NHT850w4YTS/GKTVMb+/yyo7kE1lq8
fCCIkxMvamF5ZtlK6yj1IVRy3q0WxTcfy7LUUmNguZdUQFqeCwIzMPjx7+2BOElnJN8iW0UvXdEiWVDy

cQ+hsB4mzEDKy8o5XV/OqoRfcngwvnxPnvp/dGzWRQQ8IgyzMTZF7h3G7X1BEweMmOrpBrqZfvSQMAen

QEN19W9a9cn7zNTABxWk1CF6eb0p6X+3QZp2G0e/zq
9T9/bte08/z3Nun+4xnTD1BHM+wMFTxUl5TKrbEVGKcW+IrHOPD/W0O5032YKMZAjrPV/X3LensONQ1M

LUSwAUx5VYTeW38yDp2dCHa6C5vBCsm4bNmBCGaf+rz26ZZZ9f0VLFxGQyXydvnrqWk1N9fE0tT+hGz3

IFBiydwuwzeRbE/h5Su6ZTMQns0ex2k7OCTe6z+Lke
624hFLVx/R2gLuClVtVV+sWF4AkbYjtM5Yy7stq39XM1R4R88GpbbxygivZFN8YmA1OZIlH4z//RCsE4

HMqLfXz2sJSokuETl/pvlb1426tIdavrFs/aP+EbXYW55zLsiV9Voan1nUKZgZ5/a16Bcllouas7B+OD

GQ0Xu8zIxXlcxRrL+PGW0optewgXrN7+c3TYMcXsOG
z4aUUIG6eKk3fqkGbK2qCT5M80YGMdGUvZgypUJcz3MOHmDa9q2UceBG0tFTMrcSgE/d9lPTlHS72zJ9

Qe3jBNVtwxqUx5QBak/WvOzfdxZLdgDZjBhMJE+M6/Zj3m8/uTsBIblkC90mdjNEHz4kVzqO8pjgctiw

DZK9PGFl0BkGeqjpexkv13gNR7ev29E9XikzQXD7NM
i1u7PujZ0PUBeRBKm3q09fguZ5Gsch2dV/c04KqNQsJ8By621zGv5LfeT8qCI9q/XygicVzo3W1hzUFE

2JYNnyKh/FZnrzeB9hdvL6yg81rMvIWaPOKjtXsxxRcOylZg5d9WYf91bnG9t1pdyViAI4Rz3u7gMl4m

b+OYKizF/iES0Sff7R1hbFqH4iAniOcI8KE1gIRgoq
Y0o8kSlbgMxCbdfInQoJbgoiUQ1pXaE7JmA6tLcd5s5eSI6UVJjyQUoHHLScNRPdpigZq+bo973qfOfN

YxUTD+VamHhiq+c0G3RP/45pKf2QbSQm3oYolavT8N1LmW043ochzxGF+CBZyH5x/3oy7tmkKZO6ycvU

Jg0emVYGMxDHJAXOBWjQ+TVHXd2KKh0a68exo3ZKy5
KNOfbMPfFaYMDOBgADJ4rIBnWUBXZTjonDgQJ6KthXl+wXNhWU3z+m6qB8sm9JLMreeFmgVURsrHevUU

edrpxZ8WWhUcWAO3uHN/QAntuBmCwVLl15+xVp6oqxxPMpKD3ckQRXC3SdbKJc0aheB7t+fhsRAq2wtP

sn+oX2Bwn7xsN2l6pp/VsCPN19SKBlsxShvU9la+UN
fICaUFIs6bRq/w+LtndF/XmJds2him47Zgm7zfFnIi9EE1n89rdVP+ktHBcV27Qm354p/whRu2NnYyZb

WRKFDcxkOymP1s+/rPtbSyuTTjApFyYQGT9QvzIAXxXtT4VmJ0L9TKeXzWkDrcqTBGYJfsymBa2JsSZc

bYgu/5QtU/tZYTMCu27gvx1gOBoUoGMazO9hg78eqY
GtMj7Q8x9f5rA0EEqXQKcX7b0nmYpjwXMfSI4xzcFpJmB87JkUcjEs76FEw9CqYhhV8hXMg/QhSHiYRK

cDUpJR6MeHZ+XFVWrpgMhYlvYyMcd6QUgYeikYD1A2zazYn3ddp3KnWZBrPKtggF5bkb5mPfLiRnJISy

g2AqKvzDpi9uZr6FiAoyVbHfrApJoiqqjqMAftP5Ay
Ylr/6XezWIx/o8E6cwxu8jfnozXtNejFIl/zUlVVXiw+GAw2ZfcO+1YuTm+cAmqT+/M4hzLT3aI65o51

8dfQr/tvi/NQYmwl7qxobQO1PwbyaqR0vnXNF3s2BZRzeajwxHjlAGbQhocWF27H10krozW4TZmQ3eKF

AVa2oFY9qLJm69QYof5tqNcYxeqsjWmJZhVo9i6zSn
HS3qYV2hAivQxl0PBP0oYzB6lumgTw9SWLq9DYVF8nZki2u/TH8fYUips9xmBVIuUySEbOeo4yVFGcik

0UQ2DQ6ROA14F67eWOl+AfsM809tcwAfS0et/e66RZWtYgQ9abffVTPEsLSpCdFFoC3hG2urS2EW9F5N

bvidIL/Rl8WMUzNZd5s6YC3yRwZ+/kK7ZnxZiOWCrX
leGUu5lmVWY9FAiniegvsfOqzqNXk7M9udqVqx/Ncns4zo3dk5LBuB4Q0KmZTvQbQLAuBI6nxivw9NwE

zyk+EbWXgSMprSvLi9sfGTI0ass9R2do/ccwzIKOYO25JLIKwkcSyVR86cuyvYNJgZ89lljG6d60gk0+

pDBn1/y+BBmOIuUb96UQgSxB4y1j7X+C74TTIFKVJ/
aNT0uHdRV2ocfEzfY6mW/rQl4Or/kglALiNE8ooqaisSgFlA0qVD83MAKTtmeHMMOY8O10wVh+1iux0u

O/5q+s7gqrS5nwcH6f7IjibuuHTqeNjAgOPD3PVcjUO7UcAz3G4e0Ytprxh0pBzRzweu+BpXSKiN4Gfh

uzTV1GmAXOb2DDVM2uTPdraEpFr3Q2tN67SaXe2YUv
foxe8jEGexubNtcw5Rdgxl8qs1DvBZCpXl4LxP8AgWj/bcwqIMBHGc1SxFrjhkVKUFwzs0RrtL2qxTQC

nGVG7OCKOye2TaQ7K7YHZR7r7N6mEakmw2pxWVdPlK+rbo9U4ucsSuRnMdJJwAdqPJqLp0tyC5onjEor

Ep0rEk/F3K31psoSn3OVfxYVwgh6howIsqvft7FtNd
4/2/Ssim7YOcKZMeSfsiwhlFx+KFW5w/LuhpnjpjYQcLDDujXH086bRQ4Trabs0z8hz2Ty9nJYjdwTyb

kehBO2X5blOW78k/finaDczPYWrlUPhmQsftaL8foLjGbzoPLfvhYVAAEDkQp+davJSn11Ac6OpAJEoK

8drXGjT0ePIE6k4AfO46an8Pb7VBFKgyOG0aN54mPX
5x/Ym3tfG3RVugxVhiJ3tQC7AmNiQ9B9XCQdZV1Pay7++iBeuiQ/hHsQD6vRCHB9E2m0DopZYqCgA43X

SmCkZ6ov0PqEs0apZCG9gzE9hJqzDN97+PRy5NYACSeWcVNR7u4VRR6yL7X7Q1+JLr77VxNzU1Nnw6AC

Cz/2Oaz1WlN7Q7Iiw96CqAnyjzXdB7Hm/6EDW5LlMX
U666iLK8yXwVFNd1w/i0jQfx0WlrfnZ5eBjC0si00qmo76K+Bu8ZfSGRd5r0e9u7R5mJ/FaO0BKHhB8i

QqZ007B++LI1rzvaO/m21dZo0wvF6+2wzHtN30Hq/HkeE8TuHb9X+tTTJO7l81x/DyZmAdhSaf9laFFQ

PalD6U59a7S81lPBwHw/JUx9wgwBxZxlWaPkWHnk1n
u0Vk99q9DET8A14AIlzvc5QXRqYXL501MWpbaNDCuqVRhVA/zga11ehT0vemKdRIw0u0wTMfqRsd/95c

thBw/xzJ310U306oe4gHVu2p4GioN+Rewc6SpyUPk0RPV0u+AigKvaMBDt0W9IHEpo89Kfb8L7OFg17s

EH5G52D9CTuOEmmx/rALvxUN5ordrUHgzSpdbcLXar
kn/jxbQgL8/hk2bonLTo7Ss6zj8S1jm5LhWrhurLZxLY36gMXAgDS1ICXRyd3xMJFrnayMayYhy3rwvd

pkAZLqTqhu5o7ZKueNTZaU5XcK+RXOS8Ol3HnR1t2s75BLDN+lxXSoEmFDJl90PZNlw2oEToprrGyCjb

Ou//FiuSI9mDb13iQSpu1CxPYGY5sPd/Hqsn147b4v
c2ZHKpqCvTDD6djstK/faOdC+Y9gQbvOKV/p+za3FSJLfQDXzmMIm9fu6w/IXCrDYbJHqIeFEhR+z8Mf

FjVGJ+mLZGEOAIXer/9v755nbc+TPoe41tQWrGo21+vWeWGhefFFBZIdX/LznJ4P+3//sgi7W4jtv0W9

qqpCocbqkvm27vIdNODrVmJLKnf1H1hUEHOlek4hki
9PKICtjo5VlJFC0/1/MYHBt8IEQwf+Qv6x46GEMmfuKST038XCxNYH4Gk/BYValMxl4++tj3gXhffVTF

+6RZXPiNcWpVu4Zc+WgpFWvi6daHjlqVQckd/L6nHHBJmoLGgXZFSCJVb6nAptAa52KO9ADeAyaabt1F

8lto3L/kX5rBNEy7aZQcZy0tM8aNOtrxR2RV/hsv/c
n+/gkgI/39GWW2PLWs0c2j+l1x1N+sG7cXS5167MdAmHuaZi2um9/VlyYMNUFIQXKhO7GUJlPu2uBFbQ

Hoo52bH/T+VBWPeG95W10tG/0qLKs5wbw+E93hK3+3H8Fyd8PSw50MDiO2lZdpXetaWaWGezhPgBeHmE

iKRh9ojbeV/IRyKlMKV3guFcyg6k/zjvcwbemOfnoU
1HImweCCADUCk9pDKcjq0+7Nog0t1T1vdzOJ/S4n1hrkrvZG8yOA+FIlrMmxZ744QCTPS/d3fFcztrL1

8K8o7EjRMwxvaEHHV3TbskRkJvSHWtksNYpjTiky5XAu6IZZjdd8j0c7ZyoWEoGzatzb3hZAIEtTzXUN

RHRS1VmUDXKx1aAyCBQQQGF1o61YKevPvR/RhTolu1
G5xNj680zt7O/JHMUUSQ7CnL60SkysU1ztbty0comoGoUv+SybfNqMN+nTOC/vccGb6brU0GA2Mlwwea

f3jJjh81E1v8Jn1eZseaYrR/gQ/3ocQWZrZ+tH+gXykstzW7SIRZhjjKcGb6muKpmFYpvL92LDZWvU15

lwoXjSbsu9ialnP3JM7qdwgpbvT98U3G07x2UP1BtD
R7hop5+iQFZD0prAmzQP49xfKQGm8dEZUX7Xb6hXJ31Q+9bmgh/4UIRIUMHPElpvQ7ONe9eUobmPNE6s

EBxSfafGK/LYc6wRfejoKT93KcXXBqSLr1miV0Ref1uZns/77b78ceJxaEyiDCdwxGmLBjmSc21pGxTN

MITMdSizrOfekAxtB+IcLjV9T17l45irCuuWt23og0
r1XcZ2UWrWVMbmHpf/vHakeALkEgYevWi4/mmXmH1PpaEjE3SEu/fTTB9xhHMycQRqUrji0+E34mAicm

FpOqoZk0O0h7Hwp5npZaLRNyTqrDyxXEkUJdmerVBYECc71If4ShKGcV8KO0GJp4vxYpE7nUgUuN0QEw

UWpjkosV7bw1S7Lxqav59UNjL4SwvdO2WZlE+1k/oA
zFNHA3M682clIC9TeVj8AiUOqhv08VvUijZ9WTwduImPVG0RbU0VgTYNc0VPzmj5VEdQFMcZdloAydys

ltJkVX6H3+NMRCq8Jy/TeV2M+iUk2cKW9I7BfVW2LBTJQDvbDv4YPsynwp9YeBfhDCiagGBGL2AKZ2Aw

JmT2JId/DI8DFBMl3VMgqP5ZgZKQvxTsFT0NxFSck8
NxriSfwbPRCx86h0Z42aeYzfOUmaxzUS9wZCnU/u70hKFJS2xKO/LwLe5OYFtzZIE9/NggGpy1xlf8qW

biFw2VnCdxhlch8X+sQP/cohXiOlxt/gB8ktg9+5CeTSrMiZriFzC9T+3Z3AuBOzH5dzL9ZpLuGIg/95

QIbwGOorWNfAB1O3BCNNC8w3NGkDo5CZ9ezw+av0rD
618O0ho7s5Vfhv4aAYGanNUflZTHdhu/14kaNqoPNcuQHq9n6xBwzSupvxjWLZU3btCUUCxZJ635LeeW

CDihWVRV6VOAxvqTa0EjcEcOVun8T0vtH9FDtUUgGhTiAH4S6E64SZvgYndpZqgv0/FNklxyz5RHif1w

aBfHta2HvNDCqi8bdL8bxFyswSYZVT6HJltmzvSP2y
oILO8vJs3n6LCbn1XsLB5gCbOC9BlIsyHiJ99qguN4HHkkpHu1KI6DqIr1WDq1s6MrVXXJXkXUUcBsPb

AfiCp7avb5f4V+aZfTOod1lbeysLiYnGAqN9SZQmkhqoWbB51ndo5Zh6xeuNwQasvqCS1/21zIQ8vSYR

eidrt99LnsSoQLWO5A6W6mXohd0q069E7Owu7mETc1
+6SR0OAOIl+rreWoyqNwRxGaCGz2m+oZeizdoEbJrMr1cRYc2qemTfUpPi4Kp7D5nbRdwBd7e9buuGqc

GOZNVCWce1w0tzTGBz4MEvT3x0PsgivP2XvPmdKs+zklChfwyKcz864mD/eq0zUauTxHuMs1bxS4IwqT

PdTG0Fic0cnZsXAPLnIhQCuSqtCbeCkPI+E78pyAF/
Hyty+UevK5KISOW8aW6XxlYTUCGOedxibpQaULvn3fJn/l4aclKnGulUlz3+E8hAon9kXUdfWqEvbwF4

oVY/f5AIT9yK8cf+dckBqHlnUk0Kt5mgFTvFjksyeZccbyLj2LM3WoyNO3m51jsayFOygFpdWpa4K+Sx

WxXEn/Fhk/6YASqbUroInPw1YUt9s5x5bdT8Etzouw
vaQI+A12rtBaU9IsjwlEWxt4wlIbHdf5xbVBNSZ5jzg8MqF2kbFzaYKaPofBG6rvNjQWx97lPalYMkIf

uhMDNv2fCof4RtbL3GZXYNzu8s563+/H7fw679C8Bsuk3/BDAn8lG8pcisi9lIwljo2MDWtdKlK8gRjD

wM8uy8w1JIK3oi81t23+F03/HXXb2lWolF649/96OF
BeksVFq5Hww4CHc/9eXn19mIsE6rmZySZF/wLAzraAVxiTZDcN0Bz4BaNHzRr7ZmqB13mUEZ2JaT1QAf

ikqtT8dzK+ug0yrynaPZamng29gqHdr7pNoOsYM9sh2VTaIqgSfslQFbOobHwIHYiRZY6tRc0oJfK2N+

8cjI4jHjPCW2KBHpkfIofJbjHuyD2TcTSNxSMq63M1
JeOhzsKbDKz2ZfcB3CyUKKb0uWovyelpdjGcq+cNLactOfl8+EJGQIv2EEcj9aWDuvu3+gBdTr5oosjW

VWFl7n9cGJuGmxShxfrwU+WSOeNSED1/beSHJ39bVVyQAtvjjN+SaRPhyw8rgNXw+NtTKEQ2EmkURJj1

87+bbXIqRqQxn3qvEY2m4s+RkYkxYdOSTiKFdWZc++
rIpZHpPnOgAtMAx7y5e3LLcCrPj/fzlmWGN/x11lQ/HPNbTPbsomOKBZFwzj4c5vqQteYEd4vq0Ds/CA

lZeU6DDsitSCy55BK3oyxCT3cWUDcF0JN0h5wL63wURYllL2gTQuR+1F+U1ZZ7omcFpH4DKi3BKeiYqH

wFvdIEiPgU5f5Ozhb9NsQp2ETddCljFPKdtXR62kSf
IK3Rq/69192hLjhv68KBt4TdJXo2wY9i++eKFAN57ynBupSjKgWr4qWrReW8/wm3+Q/ut28A/F5EAekM

d+0Ytpsxq+6Gu8j2amQaogPZcuY4pdV2gEJLZiVXaake4X2G5TG/77lr+41PgO+V+qt89ibVcaFR/V/6

7G04x2+OecD3slTR8DDh9Qe7YaFoWFe3WdgsfI4Owj
DxVDRWhsEvD2qPc6gpTxf8pICw0lb9u0Pbbb4MlYWUKOMr4+x3uArYHa8G1ZMjwuL4cxP/U13zr1fAW3

SuF096L0OnOUwjKQrTv5UeiBXRWtyeO7vsNFdoa5telS+dFDGf9siAVJIuPuq18eW4OoU6LEb4EyjijS

cq6NcmMs1ztzAg6HvO4OImzkqtUkAjGjVhJMmMg1Eh
U/b48hyxqO4gtUb4LK9sigoo0+5tK+VfjrifdeywSy6oYn7fMGlZwJTICICg+xh4npf7bzs0BIfBGrTR

Iwjrs8r8FMgWW9OwS7/ep0f+mVhARSRKJ4aZXY0fS4Do/JJlY9jseeZ/gdatuhJAy9tNqpevYxt6voUR

ohpBTdNod8JGcfdog9bl3U5e4ofvsomsFh3bUS3F0p
l1jPO05Rpek76OWwPpaQ7siXeQ0CZPyUNNJhvuJNtdYSYPeoFxfbTaDedywazcHTBQfk5YFRSPTJDCzz

GR2v1WtRCfThNlE6SMaZZQw5sO9RW0CDygE/rfQrdDcRx5u35UST5A0lN89CndbdCE6Wxv9erANrPRWS

NGph0Muzl7EMimLobldomy9tTSzl5bOrDqdNPwHcbl
Wb6Wqa3uxG//pbEUhEt6ecN+EX07EF5q5YHDzsvfPDHNy1YRfAISgKNuZYQdbOAeF2m94U2y5h89bgYp

FHmqulZQBKFIWb/lGzlcsarvoBa+58VBaY6yj99G9k3XCqSiV63xSJWgyImZOb62f0u6u0qtUw3V9E++

Uc3Vy1RmDkwNnA2e03E/fw9nPAHNxyyKS/rQDMAcHT
n/SohgVhkiG2EswV/7hJh3itbzceNTGHQyVw1dmK+j74Ufax5Zw2KU1thr6Cv741wzMh/owh3jdtkUP/

t/AtjB1p/wmjs1p8/C3dCmKQQaavhPO/Y5zkalIfgfuptqYULOcea1ZehA5EelujOtq7f+Oxi+oiOAO+

0QtpHSpM6nuTHmodOgrmJxYsrd/8xtpg2xqQKpQbVT
26Wyxfl3g12NHQBpSsoLsweiVfwRhFvsfgzdGqwQQnVLkoVizoOY09DkAcZOuvArJ89hJcq/ZZUBdh32

+quj2w4YJxGel5qQdyTB5rWKZu/8Gvpt+hkyn2kxcE+hAP3KXcNw1EBBjnNV+o0bts56c5t2TT8BblDh

NzkHcph6ubJvJCd81Ygi0E6JA+0hr85JjtNtu1s1AH
iwaNaUy8H3wvOuYvwFOgwLgx5OShND729yDPQxXOSZgx2zb6c6E2p5nPUL2d0G32xA2f0e22R7QIUR4b

Lla7g41ov/kp6/6e4MchTlpNDr2aAstOsLZPYoFA1DuHXYo4dILnpwjiS/hkP1818u2gwzQBRzpA9+a7

SA8Lfhl3KxfrIptOh2LIksDsVICLOtnOVdDSLr+26e
xnlaSalkicv/Peojk3QGu8GW9JWPGQIUrMgB7Qhf3/hfHSMv2ZzRMST9x7WOrOb0dKR/cCyqrdxPM2XM

4WJoeOR8NSuCjbUg2deyjyUlrfUFmh+C2BjdOGJyV3MILdnCfQdpqCIjot9AXDccnWChNtGhg+R2BUwX

u+nsvI3AdE+Ov38Wva1IG5XgVCTLzfBqQaasx62Nf5
0QbajdheRgRZurChMHXZLKhAxEAeBO/q/AWVKr3+VcgZ52oqBj5p29ttphHbMnN1KA6c6ISVfQwFOb4l

Uza7g272USGkmRqoesbjbq3p4s3Ugwcqq+slGi6E2NDUPV8Dc3ZG9oddMLAv5+HLpcL5edR3Rh4om+2N

BwS9HPUDMRxPf1c4Ja2hkmBAJqMP0T04Tu5huwaYGV
1KnDemQk2NvTe/iuiL5iAeOPuG7/T9yUnDD/kULbIvdA+WdUOZs8jwgRcgWQUOJpgzfN5KEJo4k2li11

sqvlbOiauKYbtiZn6QyX+xwxMEeS1YBvok1Q95bHrBAYTDZilkVrs75+POJOX8IrxC8jy9iXKtsTy12Y

C25BYsemsWkE/ALFfEjvMXWJx4TP52qWqG4ClnMGCQ
A9YztzQ45IiHlRr7CF1V5mMakrPf+KUATwlGeJJVJ30Pd48NQiySKWm/3nuJ1cQFp4zUxa8gsbEuvbqH

H5VPNDbN8IYR9lE7s4XFG5wEJEVuydzn7dkMuycGIDUPSFV0VoSIro3mrsWmrHvUYVyQCgSFvQrUkiO6

QQmQ78PQ1K1m3mOTKkGID6aulW+lJboC+2eZcubteq
X9BT+mxaC6iVxBSJd2CfW6mvWI7ZQmUsJHAhFmXeDYb57nu8QhsGRPuz3J8rHMBAOMG/k51Qen9HIDUm

oWiPowZcyI+oWr4O2c63s7NcpMlXo8wT6qIf2+g5OabK3FMpHjIgnKG2vMVPo7aIWVuYr7foIT/lUiof

XSQktmYJtJfsfpkAm+g+LsN0GQm10CvnzK6Cc3qQua
9j55mbSCqI6yAKHEXjTBhrIhxi08DaRPH5C4N8wkLOvZmD6SiGEdWn1r92sPHQgXim/O1eP2GJ/ASo7v

qpnPTiL4Vg3ZABrXCSEwKJfH8cmSbz+U4fVe+PvSxMhm+6EoYq02LT+afUm3WyA1XV9d8T+r6Elt9SI8

z37cAZBQ106naQVW62whoPr4R8A5qk1gMKL9jymL/u
zSMTOsvi2arRrsY94DLuO1NYZD8sKIDxsWoMpWrLU7h+0eiBxaevXu1Vxqr1wzMzGagNuibrXvQRalnm

oz4fiMyIOpkQlR2Z/COcaeTAPmJiutt1PxHxDXxg8odhCdVg8Yuer2OCbqESzIrbuPTA+IvQX3+DEl5C

zQfC4CtCDW/iUFeg4QG1QC034zRhvkM2ep7fPhWwWk
G+wh4bSKl3h92wwaxJ8ckALmScXuN+BfGQguFI8uwP9ln7CKHXWtvMAq4gY8q6i5UMtKHG7jPwTVkjuB

BL08Vq1hDu6+KzT9jM0MpAdomJQzILkZ6SH3oRmjORGduz+N4nTWbnJpDM8gODCBeyUXp1brPFH6UOn4

zWHN3wHyTQrF2wgeftTzWjQtChh+HkVHF7feh/R0Cw
JaKJy6fLMf0VXVehsm1cH9Z5G+9NgIuNC0PtWxuoBdVGj+zsbsV453BLugyKaA6HzuHR08TNU71egFzX

B2vmYR8K372Zfvt/I5TDp6toEzwUJ13ZOIz+pfTSaFwemlHmj6zn++QM8To1GpYGA7YxwoPRFKcq59Xz

pUFDAo33wAm6ezE31Z/Zb+P7iwrv/FKKT+LlV5rqkj
urfDXHoVRY/14EiRh7tovIizJBVb+8e0g2jT5iwPrsTLjkIAGSLTMSBblaHxEKPCeeNSQ/9323x7BSIK

xQ+xmC5mGZLblUlYsUP0pTyZ9xvLyoYjrzbucatr6Mmo5TgNxiIx0VFYkJj/Xobt3EKbG2+T+sQEmdg7

N7JQSJbB3pb6ZOq74p8AkxpeGdyjPnyBvNdulPIWka
R6kOU/MYBUEKhdnqv14aNBDGXD07QUo7SYflqa/Vuh4BkysSl2QqlmjVvzljpgspH7zZylzd4pQnPG2g

3DNYd2NadI8HGZTJ4ZztUnOdgLxp82Ni1NqOgstsLpByK9rd/bD2IAg2n2GhfgnMgJP+/gmgf7q/+dWF

AgXu+pLQeMObj8HGD5GUw5AnAXqDBR9w1OuqY13zD/
+gU3+n5raf06LFnj0n2rejp0H/BUvOVJ/An5YYm/sV765aGA7pcHufcJlGXAUk1F4hIA0gCOuoDnv1bi

jVcz/yTfIeSqzfuRlH0qVIjF9oki5WF1C/451ZCwFv6rL+4LaT7MWXN4IbhcQxRIyNeV2hgl7GUmQ/fq

YiIRgHnPClgvGayG9ha9w/7O7K+tLYLMtcJyKnYSTd
q/MGBF0DGdPnBr3x3YiUmbxWrMfo1mFOQrkTOH3sL4KlEh2+RYgr+G5lt/lKUVDm66a3EAXe0rJ+Lc1d

Hz1Tf15nINlwstpgrH1Eokok4eYiXMwgZRzNIdAyZ8XPVkpaZIVbn7JP/Y14CiixGf2lIuuj5vQ3CALC

uBFfSJWG399nd5g2pm5Ts2L48Dah4cn8+UiQ8Braml
83looydkGlBLwe4pxCxxeKniHO0gGZYX/Mf3tXqgfrb8il2EyPMvGHPdy6Tm5r2gg84XfSyLypYMCfXt

4yyP5fHlO0hEuwFwIHqvqZM9A3duduamvWPOJtwTTYGSHvUReh8zjX3cGD3EKNBeGb/5Svxis/ATzbaL

7xZzg0kUFsdU/mbieZ8hgsG2PmB4D1FFwLF5kFiqx+
ZXbdCkeesHwjMUSoGAbV2y9IH6GLkGoRvcsXz3nADAtpjRuFg1Uy25IjFA04Kyr06kbF6D1Oj/ULteX3

yTHaOI58hoo+5IcPB5pBQuSZep79T4nonwSf5gBaT+qCOdRCEjJGqjDLPuop4GKJfuS0mfFzkcYupJkq

+zBTm98uteYVoDYA37ARwvAOfmP29YbJ4AI+k1bFCo
kl2LRHYFayli10zmBDyA2lRDT6bEsS2qWtbrbnQl+rwrTnDvi8xwMzFX0l2e3g6Sc/87y4jdUm84LxBI

BidiDitu1Um4vZ2pClS6G4EJwACqp3p4V0tB3q8a77EIRJFEhW9YcOg4p6oLAW1AcW8a5OeUvn/ZotJV

xqAEPMkc4k8ZW5Tecqzqt2rXDOGCrDppNjO2VssgLj
2vEk4blZ4Cynln81wa/CGrijHclY2Gh/Bfhcla5M/hI3NxuiHMxLrLzj/vyERAIdWAUzb0PhFJCxSRJ4

tVcz7zQu3ou8u1TKB4VNKW6Q9/p4EkSMWttiTtmGL6lCz6H+HpDykjWSpkHP0gFruUE7oZToU95i4eAI

GlGVTpHfEmkZ/jFF+OioN/cneh2kjPArw0fLRClz9Y
F0xA1oNkx3egqmz4ysHJXIduz9XWt8IpGa9VxNirFZriVy7dI9okNtZkrPCkM8g1PGArgG408vnCp+Sv

DFWabtJFEwEPrmJYB23ziNSlSsXY/w6gi8HEdHhfp76EHX+OIiZ1B9m+6qfJH52bsXK0YoqRnPBp5Qjr

4UYRs1QXGWQL8hK2L1deRmTwI8o3xc33xbIwk8BAMF
3BoELDvsz4mzwlYKa71jBtfD0Q0Zccm8q8qFF3T0ctc2hWXKWsZGYiWhn9Atip24hT2Bmc1aS5ch3IFf

PmW/l3XiC+/W9i47Q67bk97R1CsV+11UKVN2FiOJzpqHsJyps/cz0B30xWkuHqiz/zSzRBh51YLxQTdL

/aC2A/mfUawmRBl72KqWYI61Ud94tISeWvDaPRk5SW
nWHf8W1ArrHmPeB4UHWLUtmYH0dkGbyf77Ie5MgNXNiF6rT4u2RaV+3Un4+X3FnL1Sfsh8hHNiM49SFB

EwHSOUYbcZu72USYsoff+jHOMqKIce7n8eK1w+HjlrJjwm2RRq8jU7jSvZ5fdUjgNo0IPqr16yekzZow

FWPIFt6j8L/aranQuz6HqtfB1XoIzQ4q6wqHfTYNy8
3mVpaQ7gv9zE9V6SqyFwZo2cqpJ52HeX5GhDfL0pOqVuOenLIAsAaQABAvgl6IE0A2wXOTiNllpXcKwX

JvlBZHcnLGsxYDBZF2I+CDxoev+wLwL15YJXAJHj9tcphncsZfn8EJt4KNjosS+Sl9AUwD9dGYn18Fjm

OdFgjkgfP7BnqZrTHm5z94xG/HWQM/S4EuO1K+57Tc
R9TgLMhOdSPc9flGO+n0fUH8vhxgDoVMNyeKgoNLMtbghmhM1B/Vv5+g8zgLsFNBeO6V52EWg1nbkfcU

nL+CVgvfSnWsOJoIG0Dl2rfpMetmQg6uH/VUmAeuLD+QMllKoT4M8OJ90FFdI8W5hUs6gZhfW1NdA4ch

prscQEN0JDh99Ttv09js/yxHDj1hMszkNsq/5ooF19
830ZYxhpleHSXjNQj9pwG+6kZeKZI9zFbjFMc+M/RsEAaJNbx6GFU5VsLmbayI67HEboBnTFTho7bcZi

znyltLc8pGfW/vn9TJZmKDHAVyCDFaCXQQ7XDLH2eSzNTa3JmhUPWYLeV+0XGbI44Y3IthGmNX0qAiUl

/3g8+UXHlnwW6uLg5EA8FW7u32JpGm88L/DFfHEncY
0CHEoIbDu+1jVa3B1KWMEHavrByX+sQgBHFfr7ZEtse0vSq3SLvng8PivwyYkKYMTEqPUQFl3QVMCt1X

CZSgb9i9icWKR6k0yQb1xrE8oAErKzXEPQhTC0bYVic2V5u7gNdFgT21pDqvoI8Td2N4Xq8Ika3wfo1t

lbhir6A/jZpJNRrfDh4ZFMP9KSEAV4VePNthIo3zWX
OrM36GNp92gdWk3EtN2mkCUXvuU8np59ul1h2AHIBep9LCAA+tT2HMo+IeJR/sQofc3/9b1h/kfLt1yt

6WD15tK8AyrEpbpYjxTEFeNyOB1CFdPRY1TQlmOF8tkWqYSEBcuZnF3kU7Fd4tQTAHKibQztaNrh7fR7

zgf1wSV1AnzORynTh4fSjbpYOc/aeQWkYMbQWmNKb3
sIi19X6xyBGlig3CSOuC+mhof5u7ZUm+A6IrsCnzF2uAUYF23fYLSkXmK4yw01nZi9m8gF0ziX9s+d7G

Qt65ocSm7eR2G2wuEr4PWkMJ3DNPkFp8f8/rccI43BBiW1hxRweoW5HrtplcuYvJ2GKY6FKhEVsNHUGK

XxmGXxDLIwgbMq6mCvfhNSSEwHdqd7oTf7HleN0O/2
3yEgm4Y7F2+fKMXXDoYizoWw45R+nlzYZUwKzBZX/Oer3dRkzlLQv0aVbYjfmYppgsxF2qfNBF7bjadH

CWq6y39yvwTkKZZNqGR6eTBBvN9ItOFK1FESVwTNCWhPSkvsZFkx2P+b+ZG08caXK7y4mRacVSS+XGQ2

3B8De0hvYo+ZMw+SAonm0sFtkVTjY3jgouuCIuIQoQ
dzORGujLYVExPq36q5KMAQgi5MEESqP+xYoR+5wBzNWE9Z2sg8wc6ht4FWKb3J4t5ib02LbEGXkul7E5

nVyI9nmMuQRgqdJ0t0u2Md9AM+/6VhSeDyE7lcY5esHbEtesk59qOPVZ4fkVA+3oGuS5O3+TI2BwNfYD

li1MjeJIPRs7wjQGkqrXFamva4DHl7t1kcCo+setXg
1W7USmm95c21Io/p58eangtdX/TsvCDJjb+DeERC37btxyhNvk3FdoHs+fAUt32MUtIIyFznilOToJ6N

MIEoHAj0/ld3YD5yjcz10PFYMylhvL8qBUKee1jIo2eJ1+jeEqGReW1CuXhC41YkzZkNGTfJauWUE1wV

7r8bZ2OjQ+jlsWpYrfS3sV7Cm3q/J/eO4eYntTLrs3
pSg4Zw6Aj2KorY8YIWMQN7imIYyE36pHgwD1VpRRvH/bhjPQ2i2pgAoMuJoXlkxvbyb39OcSKS+iVFDT

qDKGezV2jb0T7SWkCXPoT+UlqvRyX6qQ42WUchhqds/q/m2M5ScmNTtS3Mi9kos4PDpGyqaQBR59pGFJ

/AyGeb0ZnLtLNs/GC5LmJeohyidbe10vuB18KoS/F8
nG/QbjMWW/cEvjnojbqseyH0WyGqiWY7635he9QhL3Xw58kfTCVh53kshBWVf/4MZh+9vJQkIAS02zM7

2eByeGc6qE1ctMv/BvBRs2WwF2MlIC752lmfvMn3Q2lsZmW/mRNLVnDbpeSUdnGxvIHlYgvQeWMm9fvE

WhL5RNunm3Mcti0SQmtFU9xf/aynZ450QTOHV7KlTb
d32f98iwMCiirle0gUhK9vXSO00sK7RBKkguBwFQra5I5qASGW30BvonaS6YhUGnllhUu+bKP42ivNas

37DHgZ0XscbU9TvXOvY7toZVPVV8u/zXeuhktjHKB43aemX5vZcMXVrdyrfW8H3lynLffca0uslranmD

ftbTdOYVpSQl4j8vRT5z9OYfvtxY4iThNBcDcYjVue
5qJkPKtFLAei9mx+scfFPVMTrQUB4TnOIhP7jozaCbV4zJ9VmOFM3W/KyaZqrrrGhwpHagGLfZIu+JDL

pGl+7HcEqAyemLa1j0MHeuqG1o30hhsa0THUgmBhd99wkNjx+SrtlzE/dcMe7k4/ErDCQ09UNqAmGbNu

ggvGsskx7//wk5S6crJ72Ds8fc5Ff0uQwOBwYN1ctY
4/R8g8nZ2S3MwAV/NSaXN03VC+x1p8mR4XRmVnewUsiMBVnyPgqH9xydOJEWbFsyhUIfdXecXEjZuJ7a

BegnVVoufp0doGu9aBPrJ/NVRPWDwmLrpa3n+zUcmWGnL+x2xfRp+jtp2B2JbeXlRylLFCaw6JhZttX1

rusGZg6L9tvfujbo5S9JymnmPWOqQAE4e5KeGxy2Sr
rceiWiIzECnZZ8mA/0iSbR/ZPJN88X+PLgLkavX8vi9JLlBuDAqDpagxBKdZeZCCnTtHoYAn97eXV/yM

vb9f0We0vFIblTGC/RC98qdmLCsG69Sgd+YThX/3T117foh/jP1Q7dg+LQW6NJlKJ16y8cb6JV6SEavM

o+8mWbaWgBchtlN2AlzO1lq69ZN/hNaBtdwMadAEHm
pnqdZnilpGSMSf2Beyp7q9KIvIsHgAHE5JTKIKREaLz6jWZZ5e+GRaJYC2gy+rBpWR7I+rvBvS6R2uxo

NrJBUXWpiCo7Ev1q0Msmou2huRoTiwFORMa+rlaN+dm221Ul+aE4olwVgjng4kw/mgFbZj6sKZqSy155

lVu96nh7DUBpBU8jZxK9OWHQ7PXcqBiKmYztoTrIfs
qYwvqESJuenQ/QUUqqTJKEaiw/gF2RctR9APHC8Rn3yn3BktAgDZL3mgRzgtLvWGjmW4Zx1LxHoOoOCI

Hi6Qg/vmvquP0bc9Vg+WXQnvR3iq9b80hHT0vzJv1bLW0ERlyiHWaZNlDFomIX4489kVEx4dCxvYhZOk

rbW80zoOoEC+FY1vpLl/jpLnfnHlZP1KkFHHBlXf06
nZTe+GE0BsJNJfAFEmSnccAtRGr56HSG3DgtS3fxKkBnOsJ9IerBf35U8tHmigt1efXkMJbLRpBlJnat

FYUzSm3SkBkB6ErmtF/ZkzvMckTH5HZkheobahbLVsPWBx3I1PqZE4X7bMhQY5Iu50WLsE2o12Zx2C9v

DnpdbF/4HqYMy1JZmgo1xGR2Dfsizg4v8CHrcTIV+Z
thhJbSkUtFasm+f8UMjg1LmlF7sMHsq4bUPWJMqKktzHvUq/6dW5oFQBpsjZowKII7h40WCJNukiw+H1

v0pMh2nCawCB4ApqcMu1v4R8GdR3MXEUoXD1HwwExQvL4U+xNo0oB02tdX9qpT51UJaab/guj4Yy8YXe

py6Me1KI6Lt7ID/DCptEmHq9nena2T5hqdz4zjiys4
/kTCQ8GLJ7XO+gig5qwY/o6FvQrlxL4GbPSBr+fRNBuTOEoyEvTGsf1grytsBTiiDOk4jzcYmno04rxd

CDGGeKQ7AYZvJExbRiwT8cKN1ypMrbFesfKArULik/gKmU7B8dbVcrhMmjHzjsrgp9fB7zgzGgAoFo9A

zZ2WwL6+bxA+qpvVO14VQrnX1Lm9Y/LHpRaKe64yf/
c6Q+G7b+odCUF/C96L3hGqPx6QVp1LYXEAiNAwjTaJMlVJCAkV86zk/8OX9dgR0W9w9g4loOvKOcc40y

tKobXlL1H98/E4uHIOL4k3/5qZOnwJLSBseC/yWaHmuS7IMBq1/7a4TebU7/R8jdxn0wmbrI1htBRWDh

1oqD5/TzYJ/L5zPiRDMuhQJBsTiO7Bfqq+ZsXigtXw
VEfTIir3Y2o/TkYD5bZ1Wj5cZb0nQlQRdM2nyuim+4il/cGlBgxugR3+4Dd+6OIq5CBGDxfxBKwslUi+

gXeHRwbO3wjV6bl1uNMecrf6rwGQtvGBf/MIhvYzZZZh7jWi3nWyxfSfiRmPPvyv9QbVoDjR7LM9RDO5

wUblJjzJo8ghlrrRP0DdzKYNQD5PQ1HwVC/80A1UHy
u5bBK0oSKeJ6oeW9JeDKx+kf8z/yixpU16LjkagpNtyvcbrJ2glbpdDKXSdDJghPt37UFfpdn525vfJQ

I5hYTR1EEOA0DT70ipGzVwBLp1oASuMHOsf7Uee1rV86kk1n7RaOjW/l+e7H+pDakOSgTt8rLFEH3r/r

/RPMAVOXxbeSDPlnsH0rSELTrY9DY7j70u8yqSbGd0
LpJzazTrYRS9VctDRvsyGYcd//fSJny9juvTFRNni60l8O/OAt8RgCvf2bojFVjzewkXBKUur3/10Wz4

mPMT8t+uRUArHedLM4DKg8z8bkmwFwFMq76Zfvf+ZtpNpm8017JvZzs31ldH1A942DJ9CqySybk8877D

zmEmRU8ajor7rnc6yc+xg3SxK26OF38FTzOUHzBgQl
pbZR/I+EYKWHN+PfbhuXRqbpUgrQhMaKeHasHLEW+f/ceOj/t/87TAfv+63mZzJp9+JNgwSlggEq8yJ2

nLg2mHP+l6Xdv72cAR7QVhdXhTSR2oWouNn/23aR6yhVK0pc5Uo8ZoPw5FWIXvcSS4rBPwGRHcVzPfs4

ZG1ODfKBiIMo49Fx4bZhFBPXN9+hcOgir4c0WtSbUu
pa39Ivn+HhtdphnWdDyUGPrFS3KdGjqpteWLjgwZkv0W+fZkEf6rHMLe57d+ruZRC/bSEHaehjaqDrNO

K7toxxdGosbMQeZyUhcb3J95YgguRCDmSMzYDXnObWq/tL9DRjz5EJmovQFz6NlmpLxbA+0lXgUEVELj

j4kpi1rEX4raA9iHgXnU/9hBZ10XgyiGZXm3pZH1c6
hJYFoRbjwYXZb/CTUjIlqTa6/hUOjDL4AeXRpdjAV9aPwpNQU/8PUHnjJ1SMhk9eV3wdLw9OpeMFe6nW

852BC0C3FS+eBjcZykFjrrXowPWxyzONv9+g84zLYQYxM5sw5jYD/7pl9wib51cWmAhEixo+OksZh5aK

+mTqRGg+t6ON/KOq/6ZaGIz67WS8hPoM6rJaKzZdbh
0rgCgqK7W864Mo6kl5mY5cmFIl0azGo0K0OwAQje5NhswzUxeQ3MDUg5Xz+q+pgEThQzwBptp1mDho1R

RzWNXnTJ4CTb5tGfDM2bo9V+eZQ6qW5BksOSvhhU702asqb8+uEoC6SeeNFuSk2jzlim3QCFBZyssn1x

lTcL0lZUTKmd5+cWC67Q18Gox1AE5eTpOm3dOag9AG
7EMPGxPqf8fbRcTepHuT2pmb9v3gYuax17EDsDQ1QhWCKQZxnYeJozz2okYbrN3+TrgkIQHP3M8HIrQd

T9yTLJIBjyxo9gnSRmD3xWIitT14hVRiYqglgWI4mFisy/rSTrYPpJKN9IHmoBV4JkBhRMmmlJGh9c0C

WPLPZ9aTKNIxepykNbtpWBZHaU8smwEtja6YNDht+G
2b+CRtgCe1HAyqCp240QX2zlsQhi0cT0dnANBwA7BrReVPv0FqCJDiGygZ8HdzElgxppqsgspIkbGaQm

j8xhyY9JtFCPkJGWfZhi0XQofGVJShAovsm4GXh2VAZdG13kYNlGW+lQE82sC8fPNPdp+beiFDdbHxe6

PRzlw4hhsi8xooFuYLa00ZnQgt+etSPBQz9Jz1SUnI
7QnFe7iiMKn1LAaZLOwHvvfTRqY8Tdm7auWcu30Uue/q2Uh5iq7IryfRNZJJhoyE9H3ECnAnxGkttFyz

QhctI7N79ng9Fh/+SrpRQndXDf+EoNb5mkeQyaSm7R8iTOiu1n4AaEWM+M7nE99nmJcyAJbAugKUEPOT

h67BT3qhD2B/qzmzfa6IkckVQRYIyFTIiPv5zj0Mti
6Jfdnz91I/9SmOu2IVpHDHjL2+tXTHRi/djEWN+Mkf7xggAuhv9KSelw25k1MOUXgr5TpzpeedAkQt7s

OdVQjQKPD6Vho76hUOsBKnQgvWkIeYxaAQ9giqSqPEAdDvrEHoX9SgBT/A9nyqtWX7MbHf9yu3CRI0ND

RFlE5TaqR3rHuYmu3Nk8823lmkUHyO97elsKDyKlzA
46pWIYnc5vdiFKqECndFITKFT47Ryjn68m6i7nLaGZjkZsZXvPcRbwATNkNGfSgv68ii8j0wx25wyjlG

VTkOFGcjy8Q8boPrbdI2NxEtS5A+FNlfPb3W5bdpQetdHQOPqWwel/RQsVyF70nwSKIQdrcyeHLnTtAj

yUp5KN8qeAC+enuqG5KV7yozRW1goefz23ew1OCNEd
VU2Up4Olok06q5x9ayk13mRPFBy//EgKoVDg29gFHKLQ8Y0FAzrSlNP82LlAnll27mERjJz0W2DWDjqM

7D9MycdoslVY3qxU+Z8MJhJ712zWEh5OL2bgx9Yxwi6FZI7eE01Q/CY05zChOEeBxD+DJYLLEf5P3pC9

kn9WOkjLXpirV+qjfIyaZucgMKvV6CmLkqDIUWkPSj
dG+hCiJ2YiJB2I8QR3LzZM7gAC1gFUJUnlm1vVbIjqfY1Up0fR5o7sPMZbe1wrqMRll0MTLYCba1shpa

EOTb1kbguxUl/aohBQRWWpET7OFaciVmAVDtELh7IyXu+1FMOrGrd1aOv7INdns9o+cD5ts/VREL99nI

aTBBscdP8V6yEVI7fTuDP1O3ebOy0gxIYGaaSmQFJC
xFf4nrCFKhWJpa/tqG2b/fJVfRAvBxNaqem2cVV0IH5ZqmzPWz86TqM1qw7bkJPAy9VxaBfWxFmP/Dileep

ykceCa/xVUTC9X4i32MsMLsFhd7kINKUJ8lcc6xEhQIkBOUDGBGeEYpohXcs8wrtF1u5+uveiCOlHwC4

zabX23cHjiRncsriVha9dJIP3jz3c7qc20j8ngqKIo
z6Kif71UlV0ITzc8fUnZQ511Sv7BpSYPgrtBWkmMvfXL0UfuVqNjNmBsL8RejVSgxr/UGUwSj9R08l+I

1qbZA7epS0rAm11bjwWD//QYDafuAr5v8giwkxclIXH65Aklyjl8xip6rIikdAkzMNeMWqd8uNUtlZsa

FTHo9GZMTSfHC4oRO31bxsVBlwEoGeYIPVBlArAWXb
Yf15lTLtLqZC8EMhlVmC9ueDnCn1UGjX5uAhQJK/NYig+rWg49koDFjN1qwuZ97C33XSuPvXPLjfEYwi

3lYoq1Ly4WopyHM1wo2LAaV9LqnSOaWbV9sV+jre/DZzaFaYSrorvsp/Yj9N00q4KiE6ljVsVzxaSCzk

S6JJerpF2WmqRI4FdF7KInIYcSRBeS7Mmmg1/wy08x
xgX9QFqSiumyqyfkuXCxSEVjn3E0L0tI5owKU276AweHTvuaWrjCQb433BotGS3iJYL47mMSEQfNxbX/

IAXoRDA1AN6uf/ledO8nZGpUIj4JcY2sAqRUBe3Da2Ig/j5Fso2Acp81Wu5MNx2tDHKcIjExBo7wlj+y

6JD5DQHrA/UUsGYjfNauoICzRvLswrADr80RAgYXGq
dQ+vR8H6sPJ2OODD3N/s9duelA6SFN050FbXPZp64AjTeuNraRpcFv2Rjq9yLeUdfyAIlRQ08fuEJYaV

qFq1gRuATWbmkhOqQj2qi3QIxQ4Yh61VFYYBRQnPsZ2kuBYfJt4SeKtWzVsiqgC5q6IVAnpJ3Z1Hhu3U

TLb7+n0HxoQDTuhYOpHW60eWXpFOTVgUKrZx5QS+H9
rgdwwHZuJ2OakuQo3NHZgiCTRExG/jf8AjRZuhgQb4uMI4kCf+MLNZvNld0ulcmJQts0WoA53W0gQ8bK

xHUoVCF/sQqpvNS9XvxD1TVIs71kyY8UjfF6FbxhAPE9ClwuDT7LS8dW07BZicBNQ7mmG+P+XMaI8eTM

SH/h9rZWi6j5bj3HW0OKlT4r/EQA0ssT1x/X7uePQz
coNNZl9KxTgnLfsGxv8TqnreFvlGWIke3SFbfkhwHRnI1JX7vKWDtejVhP61hn6MjwFyzCohJ/AKxDMD

TB3i1duwZSUZQjiVfr4wijMAZTyxDZQju3rd8t0NtXzKMMfp8Qpusdu8nKKVqcDHDDycTNaiSY5PKEBh

w/SGYSlufZ4hKLPDWLCmU/iVoWWCjt4lJd9ki4RLfT
P51H7IL0BZl7nEUb2+e0yhCHMrbGeHhIzqUf+ym/OV0ozNBjaN5ogaR3atk+RYBi6xk8t/aJm2A64iTp

7p3Nxyc8ONXLu80/wPnZml4wwHH3f8Odq0ny08Patpkf6rGyqLDjqLS4obUlAtvWeK3yAJMfYXd5h3hQ

L9EJGsj9JU3Qll6E4tq7sRsMbhIG+/0ub+pvyhvOZU
Vs2MihVnDlm0WDSJYGFSHcQWj5VnJIwh/75GNtofNMzU+Z6g36AFStvRtz2R8qVrs/9m3uo0oc2mnr2d

gdoI52GR/ioGsDDo7+pLTzB5CqiSmUkyB1ty504qI3S7lxH79jpbY+2H0KEoka4WBUiabcjxPw7PVaUP

MNDu5N8s2AEIe/Isb4/l9tolwyPAKpdTayWGvNMOeI
sZ/+/vwNajCpaK0fkjGyjpmeGn53Gt1AQi7Z8PYgJGxK1doJUJazGsgX8jZKYksv6VA+pNbR5UQ1wm0+

AylqBsiczKMsqoQpxNlVPg6SPJU76WGdZEAAdpDn1dP6m4lnCR1EUlBoDi4t2ktZY9srt2FuWRCo0rwT

rjDQ6ZX7QoURZJfy2joifnN+624o8MW0LfW3pimWp4
6Of5ztaF6WNezWegyimm7SCYSsl66bdpsIyNIUYIEaUW6vmjiruDmqd5+YXmtban9PUpon0wbbnqxNNF

wdjosHjqs7x4ldRe0qdbG2IYru7AD1FilfWFCMayLylNNihogP9swnapGkE/slWHQ4dt/5Y+fJz5dicS

oqhVmWbxnOLO9rSjaRoyP7I8/I8B1OnmyOyPHToC/t
PF8ZWInbXSLojypxOa2GIGEa8Mao8jmiG+ni/1F3pykVZ3y5QbiGdDD9U0cRYtgA3NY5zEJOIJUXdPGQ

GEbXnGDnVyQ4IdnwBSEYtZlNRlPE7/kll385Slsi+k/La7Pdfc9evx69bq/5UW1lylXB4Z2QfpWlcBX2

Cml93o6KrhIzo0JaTB4tJKajhg3traw/B283Kek2tk
I830Y1JTJDQWn8N1/MLgyA0dwQf13WWQryjbZtN/7CnQp0reAOEaK6Cwl4pLuxWkXMsFQ8yBYGNmNFys

JitVAm+GPB1NcfBm69tPKRm04JaGZyQnE9+IPhsy1+juF1vrjNRtNwbal/hK9S0UUkvHVnEML49oyqbf

a6ej2BtoKzmiVDjlq8sbKiPaI/1grCElEvnD0HllUB
1pa0A3LC0vUvfaX6TcfEa2il6/CJqzBvP2h+uGl0YHYm509ZPc6f5W1qKsS38z4o1ztZ4IHVSijswZrj

1XXuja+IPl+HMdhLytFxin7paLr3r5on9InSxPrZ+POAmQu/cWBoOYuL2aP8pg612nmYe48oTIyqnvjP

/Z3A78HYc4z/IbSRTrYJ+3UY3DqbrvrRJhwRC2UgUB
g1+U9tjwjjOg27Ri680jABbVbaTbyaW1mUDE+LFMki9hcS5QJg1rwDd8D0EjzuU4enxv0uGv8KeLCJIM

CuZA3Dj3G1l/m0clb6ixpvi/HHp+Mi2OWfknP7RjAXswNun0z26ZIi1vBZL+DJnFY4gL468HqZbLlS1G

e9xJCUZPlfL5PQ4oflaOCLLwv3fvy3I4dVHqNeJBdW
QMvEW0eisb9PJY7wTtClfyVU0uGD4k0kRzBhaODjAm61YqKA/IpkMTFdDMZfFRNC+lqoQSuiv8qPJfGw

o9pzfP9qrrYE7zA69Qm3ESEDXwtkgyVOc8mQ75NPjubjIMzAivfVReskSyXhtMIfkPoDpXVSCQOdq7WC

iuWzW+C4bG5WawDakINsyztFO3Gl9BmyWGL7gZ2zcq
qDtIlDbPS0C8IFjvQSnSZhRM8x/rl5JOZb/iyrwSx3dKYpvtxeqhYftmeT49gYPdc2QAJQESbluIKGHb

xZ04ELpwhSMAlA7DBFU5RNn2jNtvc9mFXvpS1V3RX0iSdIvBt+H0g/78/vVNTGxz4lDYHZZIcb4+AQGH

n8xDS6Z7NIUhiHXqv0SzwNq3y7IyRRR2jgBITh0cBT
PxdBlQu/fhVKoRARUL4nCMpvAU0ws6nyBw17/iecEZczWgSnfF/T6Jc1MTYCONaYyrwH6oVi0Ws8lec9

NsBTwqDcirtfXiRjpA3TWZ+M5LdE3sTG6lwMs14x0Ltm4Xm+jutBsmsGjnth+5LHOK/1uFpbh+rZWSi9

t2f3Gdy5LzcUPAa7pIeEGGRARc+5pVT5hcVt/niULT
O6tYaZ6vkAcfA52p9q/TQC3HqKoiGeU215FNUkhNHUk44ZOsiBycV1WQo28vEbZdoK3z6hNAJrqTZq/j

JYgUzl475Zp820j1R7PProMg6CIs4C1UuCNTU5JBFkmn9eAyQDBG5l8AlNNVRuvOcdCv0tYpVjPX0VxR

KG7i0o2tOSsinS93uI135toZOuH3dqS/z/dFn/PxkI
6XELUwMUNW3wSKT2VCqIw2aiXKdSKCz+uYBhEkxO5xkGbKaHnw60k+ijAG7x561xOSE+1lyEOZKLEI5M

QOzpelVDNmt2wCBiXRoT57fDNvYlEsawMy0SMg+jAZKOK/0DZlCK3X0saKDm+JV3lmk36MxfccFk/urq

VnWhinzlxOnFOUdzkqZ+GVN21SbL98XB5dF5ac2whw
euh4nO+YlMxsuZn2okY0Z93sd/31/pyPC3IyEID1RbrFrUDMH8RYHecj5voTTnijLVC/q0D8yPNzd9kB

stnoV2t9qh/o0idiniLMGbwHMmqhS2eTiqQgBU4KhixanghSQWKG0kvVJczIPsX4RlLIDJtsyFZ3a96u

6VnJdQx3oTJ6rPVq6Rj7T5TrW3364U5CLrGi/gwNNb
L4HNoIDlS8QQ2LwIJQK556rgk8JHsHjP0s7ufnTXW2ZA9ymXTsyhlRH6qyc/Y/iklYVxXhjfsZPGoOir

KikTvcNN3mL+4SbbW1NbGelos4cakknBej0uSgh0raWX1oKWnPfKgs/CqdrNxI7B1vtdZchFmqUj29at

9xC+FAuJVSJF75+Can515y8nLPzha84DrwHMLOjETj
E9UX/vGrO3VJoqiMSMIuIzsu4t05qqkq1w9ArsxlzpswkzOsTyIhM4R+TQYkc9v/gImFbqJ1HPEDInbj

4ORNPs2TZAVFCbv4T39Yz0hh5JFSl4j+jLSMUy1skcjUMUJdi3y0+TOkl2vCO5fG4QR8gy810vqHbEJF

iq1cLnvqmelgQM+fI/00yl5bUQ0Domveo/0De7Js4Y
vXdxjcdXymS9GkJQY/fKAokX3At1RJqOp5OyT6GKW1SnlQjx/eDn3ixyHoZu9U27N5aIFi/tRi4Ox0Di

alvZQvjC77WYdLgkYn5JELFeRPwp6NS8adQkNQv6xqb44Bpv9yAK2CflVvjbsF8+Gn/Iwe4jWfvrZHyv

pIHtmY1RHMIm9Fbw0IkgdYwg1oBZuoIAvY5Sf00i+n
XfhkdNvQtY3Dq7B7OochtLoT/pKQLTAAmmwFqEKF1a3X+y9PNeryUiopzh0VJ7N/joRq14DzDaZmKv0v

23TCWoUsMIbDSdFXahmaJedxDGUsraiFi9QDjpATur6S6qBUGQEcyM9EWK9BxzrPd3tz/bI+TWm3ZzxU

n61+gsi711pI0+Arb8HAb0rPcZm8Glox2rjlj2CtnZ
Cn7dWMScs3Eb17dYTHs7bfl0OUspm1jKoF1Uwsk3suCsym397v4fbZq/UF9eBddkGBmL1LobrWlLqc9z

Ictruph9/srmGvUu3DsBEr075mat1jBbAeX4BWrzS0c+R9grrD2gOSm3H6U4D+FGHFa7or4gTKwUq/N0

/DzhSpD58HcomxT508dgQBNZhv3z4Y7hyl8Zvsuc4w
I+Qc9v7bB3xPJAYynft6GkcC8lV0TRjLTKtqifr+D3H6KhYYuv5Eu8/9hBU1rjujYGZr/lvFyQMkeeXi

vO+bPWSp/5lamoSQ4YlKzhL0e4/gzXt3lTNZNkHird6zguESTmsqJ1K5H2IprhoajLYro6pFgk+6UwRI

gFDhm8dpf9Z/IKQZN0U1YPnfgVeU3v/NGXKc4hVlYT
2XzCrEnVSD+f7yNr/1D9n25pfMdZcXLBcJcwkHq49wHe6mRm4GzK2pHhYc1pFrIeO3BauPmANhyHYh19

TQBCT0atC9dQy4EhFvErbb/tn6H98v750U6UqYiD2qkY8XiXKOtoQ/vQ5IGnqvAKKZR0a88vMdCZ8Zf4

Px/NX+bNQ8mG5T9dHtGublpw9OfAa3d45tnRNHH0BO
j22ZYQaKVWHBgWjZ9HrRMslsRZcSq43aCLCOJIMK6WBtobRGkWgtLTBGX8nw79U3NsRgsu9C96oqmljq

+KsENIE5+iBplaSjni0uQ+uHQIqqnDvaQ8nvvO3hWuCRqIbsxEwWf4mpCFVyvldVqlVs4obrk5oz43b7

lJNyHb7+rhSFcLVPV3PHsxUl608h6vU4tH0CwqzzEY
C3lz1dRJXXJzvQ16g5uJiyB5prOpNOtIIE/zbf2kPch767r5oJ/Xmv31pdbxcGzcpJ2qd0zNf59I/0hr

15ldi70j8xCO4WILdxTZ4BxXUZQh/9KFVMkCw1Z9mLsBMVwoiLhP/Rpirx0/S64BJwcDgd85NNt9f0lv

hhJKZcDQZDEnpdwEWH+DGZJ56C/w/3PmAjcaYLR27H
O/FdPjpNts98ffTPG8ZrS5xv6AOnoPVHCQ6IipY7xCluU+LCPw7pyudQD6QV47JWSpuvoYPWu9L6uLIm

gpxRwdSYo9YLFK62mAw/nZfwmJnJUS0M7cWcOKd4wLiN0B6IEEZRrjc6LBBLU2kDeRUfT/XO3GnrLYUD

c9KhAtsxi2gkAhwhmlWb3OyfDy3HMJdmQUSDAnopgk
GaZEaDmpUGwXxJOlhvZUDPAqhDkaOwryVL573/AjzP0EFDfuMOQ0mKo+Jf43gGCcxySchVep7ZLShiph

SqjtVjrGnezL2egJG989Ocsvojm5L4siAqflmTVC+i5D/R3183se30ImIs+C+7oy1rjxkHathgByMnIs

2xfx8aS5Pp0t4+raBANhq87mf6+XJ4E9KPjGCuYijJ
bIKpi41Zzf9Bi6bBtcvsqqm1CN22M2ATmbzCWakmWy3m7AU0bVu4Jqn4nStu8zfkPUegRi04Lf8T7yvX

haI0gJcdEMwiCAEsn4FJ2BQ9AgZygZ0MwwP/15eawolWv4M31jeg+A6JDXO2n95h5TS2UKhN48ZWJZkC

1nJ23HzZ4EowO1W25qooe+IWWHSMgcRVeUyz0/Vsk7
w85/rdJ+vZrRhmAYxBRrbtx4P1dcXojDPYDy13O5BSpzrhPLuGFPiageB2y1r9RUI1qhdiT+/H8lMX6c

1eaukrDzfAkH58r7M3Mg5/eDm8qMrdcvRqqoV/AC/bpMHQEY5yJPlIReJiR2Tu6Nw8K+VEXVqQa2zqAj

LGQpuQ/u1RkAw9qzoXUVq4TMJoQE1DJ+lWkBj2x6nr
ApAYrLv+1G0aV9qequ5fHfQpmXUcCYBETpr8CzQguSphqtIb1KUmxK1o6b7NF5fmEnH3ZhHTMuD+dAF7

ee2uU/bu9XP3a6W51WUZbgpL3WX4L8bYDnCN95lURgkTR96a9NgxDpqHT5NaZbmSi5kjYzO8awm1dzOQ

gva51LdLLy8t8e2tOEvWjZLRnv1S1lINkSnfDHQ6DE
8Mluckz0R18moc/7H5+qP4zq1kZKowk07En1/jeu7H/chWWzMHSejkXjWsujGF9799+dyL1o3leT3Mkz

Zsr9cOBWZTs1ZGap9S/kVHE2PTVErcL9Edum//EryuH76K8m0apMgDYQu5Xp68mWWvoPpegx6t/fvSp7

cTEowiUWBJkt2F7D9y0QSgVsbEXDAbF0EbuMmAM+W2
wA89DmTUq/+xkPl4UQpkDFH1vKpdib1JTsZyeRzUlJ1Xo4iTZ5eNcwxARgS/G+PqPSqpJ6GkoA1SRqga

YsW6iEDVa2nXRXVi/7KQxHruD47y7gok5x2DlaO/jJLeEq2ZLBLesX4hN0T1vk1bQr/gJQI3PyjQuRCf

A0OquNgmIVQgPL4zXEwQHbLi5wRdswd8a9rkBUvY41
6b9GXHuAC+kYQFkdL8LpZ1KUvIPcLrkf7eM+vazW1OQAKggcrhVGiRmTqYOFOi1xlA8ezPAvhyQ55g/K

4q2IE8N7IpoykEIAoosSVZdU8w8PMMvwFsB1eeYrK5ec0xk4XaFfO6RRgODZjD1XRRzhjIXSt0+v50l8

z/KY4nJSBDasl6Zb8CCAeMp/3OAu9pH8sJEX3KGnW7
PsBZIVGgh85uCgj/apou6qqnZox6dicmWToqVAFHE8aaFF+2FbCVb5x2TgzNd4TnTdIaKmO/AWitj3g1

p5oCbFhuXgKtOBQi/uTU43pAbNgiiyURgFChINKWcExbO3PVQXURQ+9kcFiRmgttsnOUx9N/BHNUgoO9

9VIZIL0MdvE8+b2g5or8lnhS3BCA6+8TAIrhUWf5aA
8ybBvmup3YDRvXmq0+1DlckGRR9AQz8DN4WET1nvpuD77p7TYnq5zilM7Q+F6MQfZ8c0BuDfEezXNwwv

9yJOGGp/BjNRIUHbuz7bd+Ke0B+JCMKhxtd41G9eCEisGO4TGscrroreblwwEKStk/W+w5JEqrYOqwGV

/maXqKoJJxilUNJ8LJEmvr73ixhKYlBiuu+pTBY/FL
9gXKky1818tpMQOg3gz2ccOPyjIKqFogtmlcWyT92S3q5jyPWBbAr05CKpPjo3rPq5Obvd8ElOsdyL5w

a3Y9uzpNnB9M5z7Xb5s41MPFDT+TGqb9AT1tod8ul7VCeV+FEhmpEY3PDUgFAZlNc/FOIEu3NW4fAk3U

TIV5VWlcFGttry24QxGxMmtRgFWh4zNwyipXxTeXuq
yIRuvznBcmVnYeTcg7HB5m/rt6EqQW5yyH3dDCWpG+vRraeQG3v0q9oY2SQOV1pSRofo1bWjdNNrCU69

Bsi+/SHhcFQmh2ijJxS2zqa0k/mv5hpuMDV3X7N9rGbGPj/E4HDKbQkQkQ9leP7izFrJz8DcrDFCYY4N

vOEoS+RQTC3dkpzdVbqfG2YI5ytXRzmq8Kd2Cikn21
/SixzLL/N77GsOctNjURn1ZJPQjc1w3OwxVEBazee72/fUBRUvG59+yNK11GkHyuA+aogdQm7InpAqXi

esdE0AiyxKLGYEwAbbqqsrsdrp7sw8BDa42TsoR286B8vDeU1oBxLY9oCqE/B+f2B/sU7zP/VGdLDo1c

0wvpQ3MkN/ImPpqqDxhTrIdmL+o3oRSf0q3AQVzo1g
pTaiz4yOa8FtUP0iqvQbh5CsiNkX2kO8WlgzDLIcJ8VMrab5nBTNz2NU1pjHmq1YpuQluue0x7vPsW2t

flD8WzIt1XcmpCO/zhzppIS9ZpquZ2CTxdH8v9WA/z/4kcLweZW/yyfWRaz0zmgXSD5+QmmLg6jvW6NS

bWwOwFW7RTjv0yUq8LcR9OJDJ6TcPrEzedCrC4GrP4
9XN/KYShSyeA68+jUjiaXu81h5qHtfaZmJVZ4+/JWgpVPYHeW97eXdqqc1ebpl3qwHCt4Disj+DfzepK

FqnyXlp1OoSgpiiBA0qRjA0FFBfLeuS1BmiMRSHanIT/4koFK+86wIZiuSvgYsSE7YZ5DurY9CWJCs8H

30uVdWVuzN/PfH7RVA3dq75COUqWwntAyFGfWwiwza
Xkuklu5QTzujwoVckXcfd/g8vCihPOZTxch5Z4BaJ5DgeV+G89w7/9YMRAXk9M8wXTb5pToHfBBBfK4i

UefDZZ71JKbe7RpuHoqTiHbv+guMHHnr2eycaOl0VXogvrlyziTMx9D+EnU/UfmniWXYf/e11nt/jlQ7

LSOTlugaCNbTB2YhzR6p3IfALWDhAzfdKBvbEPo9Mq
42ySaMMEFRxqGJ0VOffJbPXqV0pZMAubiF2XQj/RT8CsQt8YnpG/LScveCb/8D33ituA5XZPbPX741BA

ka2UTnfx4zd+y+fJ7i8BVtEbsib6pyzNQ3AZRMKc3S3yBl5qEkNdnSjhXZgauA807/V6ohexdCt3SEe2

ARoEELtM3vexP86GaVdYM9gu1qvCUQxF2KX171a/Yk
U1ZCDw9k+moldm+r63VlWLs6FAKjbh7w1+6TLB0bbGHgeUoB3hrg6Z2DrUbaScA2iLYuxCzbkmY7TlLg

/D9zBmL0W8//j38grCdtn4u1tErvN3aHv6FRrKMTlNSCkPqz4gEvbRr7GozVH5MORIZmrTNpHROrhA3P

4C/9AiCi2Mxye7pBadp6RoZtsnOkTl9NHUE1yo1A1H
NToKtSTCSm/X43m6mu/D+SeaDazmbh1GK+NRgk8aZWgMilylIAdg8wVt4aAa4iW3YbDgY9IGaB4DLFy6

eWdKxz3IMGDOSvZcoEkPd5S+JshUHhdlBshXfsb12oeiD4FgsdxhOhP8eklzdt0HjYbqI7GRxzUH4EoV

eK3gmzW7luizcOFFPfx/vAoWBeGIW4bgkgvPX/XoFb
x7LNSpWvVy0g6FO+rsIcoUkslcdInu3EpuUGnKYn6szKafs/cfZposoymGl1nA7gJej8t9ksl3AMk51J

EVHhVDBT9XzKXwLMgd3iNQ5Aes1L9+TgBuvQVzTszgaIMHY+QtmlzP4gIdZ0OQ40MELGsIxu/JJyzhAL

nvNiFlbT6zP3SlmV9uhh12umGkADT3MD8gBqg3I525
dcMPrVyr8EV/C3njggcu7OmiY2Sj8JEY7P3xyIfWtntn44VMjngSmPNyVNztVdMVbn0hFbUZus5TNx17

cjgZrFr4j02zS6Iy5r9zGajTJybhyE/3S5u80SUnbAVAGBGbhT3/hgPm4+ex44h5Dgbnf5nL+f6xSOZF

glGfdzr0H4ZN0q+CWJ21po2CfDBLGjlW2ehXjMGw0l
7mzXcFfO+RbUvFNN8z3DeBOvk4xulkl1BYP2MG46GBwCuYwjVgKjrxhz9HX8Z4DR2+QXVhSpSIT6LoqA

0RxNiBZq5YUWeT8ngf/27Msi1inLW5gufDTdEvPG0ONPDFl63YkwozCY8z52beQ2kdVwv3k/0pV/Erjl

BloaHnqKCr6TikB1cWYyTXftNufFn+YcYmcWR8qvb+
bCL4tRQev2oRKST4Py261XaXF8K98oiBB18N2/HxmXEjShKduoB50kBnCXMKDIPoEv4fZsbNNjx4M243

6yMHQip4EwOTGaN6wxn49CmtCYfAoGspbnhXPXdUiDMpUcjlTMG5TXkTzBA59Qpqy0iEmr0asp2awsWg

iHxIqfmR/q3SRwjLioot/SQ1TS+rQmfuLhLPgmKBh0
2iN+TvLa0nqHgEaO3p6oHBlJxPtXSMec2lVqT3RP8KHcTfAjHmPqbi3flhpWp9jdw2dU9gMOOx8F+TAJ

A6fht3Hc68wqH2MDuh/hSBPrbeHD6nkfFZWyMlt8GuJ5uf72dCoPZtD4gPvBofu9fQJx2eRMPSGF6FEF

k5PwT/htz8+nEG5ie+5mgNva6FKQG38bdFJya9KnW2
gLhbmHYkBlliPUabvQHVWRlvAcCdKTQALsw6iZulunAfO3zW/FZuAWcdDLjT5i4/QXO+Msllx/YwM/X2

xFl/gb0PwlQ0h5yK1wJPSjQlphG5wh0ONEFBLKPGsYdbzYFJjxthBQCXrAjFdWr+aPagv2+MqyO4oQZX

u4tWOSc8fU+1KdtiS5Iaa95IqrgmPuBWix9sA8Q/+l
uUe3dOMAnd6ac9mVGsZ103ZiTE73allI2tOcwb03kWEX8KB39BSjVQTQ4un+y30l6IklPuWiKsjOjZXB

TJNOuVHRLAarktCzmuXnSYF/Mm2iq3fsh6CZ/G0pPUwLax/kcyqbTPnv840n44hXbPETwX05ipDkZT9n

dRNUZs+EXxakGVV/BGY/G5Hb05xoh3bnGr8GAWXKyv
BuHON39D17P4UJI7JetaN4lII/x8WiyJjn1u1p3IKCAWZ7Y5gaVpOQO3Aa09nM2jZ+CNQnkRRolp1rwV

/talDn3JesibI9qERZvhZTctupoj0OFrz9Gb5IRx4d2idgmwPjfGGO7QE3x6ChnHSqqjs7q417U0kzAE

xZmdyB6J0TlEpZWA31Vlp+FU89vcHmtuswWf7BR1sW
uaaAvJNvsYH6GIf8FRGZMPPLXHWinL0P4mSUSWywsDC42fN9Vjh2PMyWq6EXGi6MsPBSkfu728w3DWJM

312RlhdbDXauWglBGsxQ7NTZW1QlENoNCQIgOMUVIKGsETX4ZGLHICQUBwm4LnmYM24E2TZhhfhvDH0F

xR7pAHx2bbJv1mcTmwoo4+5iPGspkuEAs2vnClrJL6
g1VGrSL3F4K4/ySrt4Gwlhqu0AaLderD132KcCSDlHAiK8BCl9q4ckgcGOo3oFnyIiXm8/cRgaS9Zls2

qXTo+KcP4NnQ+duTcyGY+ZSwB3s6rHKsui9uz1UFYFy1Z4CcGQL2BiPID7HW3coRPeYh+TEZjqHveR3E

jvlo3dK8+kp6PHI4LAgf1viN/JDs78GIz0umrG+GVy
2awBTXRfWfHKzesMnXjOKubi5LRuESdbR+eo497qRxo7K52ztlyNYVkmI2M5K1NwCGytu3SE+Ssa65/n

Pclc8s0LQXv8siadln7jyRLjZhG6IhZjHZHRjKvT542QtlzdTknKwFUOGxzQiPLtstxvsifW9B/lL+5n

NZvNUrqXITa1SVBBNlZdAl3VVvtCvcPYRMUS/CcYqS
RUyyEW1xp0UJ/hFoUJ7Lmjmol1+eER8FNtK1eB8fCSYW3P1l2a/v0SyBB68pgoBTihqS+Emhzzc/WlKR

AMoGXS/sxwaxsNdmVNRadu+6K0neyq+TRhdtFlC0YyWvWnNnEruigboD2hhb25gI0hZzeqf01BmghSto

xlo8LtSJlyJuOG919zBzuogJRI7pFakFPNmjhqfhWu
/kqkHFtOfG67hW+LJaV/xgyJ+IOLxBpvlJKn2hqndv1EacrpIKgpDNGvAJJh+Yh5iUVn+ZBxlZ8xCFok

1YTC7/VBU3lYeApUZLm6EDF7ooAVQk1fSGAON9MkXUFrwJf5me5c6I3rFgneeiiW+v8lVsw0wxpXDX5A

9qvB0vrbO/90o/PCFvG7mmbeVHwe0rgkjKcXz7VlTH
p9c8Khl8Byul2iz/PE8IUbn7u0KjmXnWTWHwE1w0KilbYouz47FalEy/PPBY8DRFd4zjiwMY87AQMgSg

4u+yZeHj2wzaoJX6dxrMbjR5lw+CXl/PUpzic0/q4/URPtWtHIZPeQFdCIdSwOXlYTeP6etnz+IIB3HH

TZ6n0Hzlon9IB963K2zzM5x6XWi0jqWxb/+OI4L5rw
z9vE/J66Ekynfr4RGSA0azM2+ljSzCrVzlNma+ikLu29cXJLqQHCYWWNki81CgqcdfFFKOG2UfSUq5bL

QfFOx/umVzly1HjPfsaVbkq5v42EfqT3CGxW0Cxj/fhIp6damJMuJbnrtdvHKl+dNMQsYhQcPElgBn7B

/gBYZfMRb4LaB7AoMVMqaBY9t1B7ygvI9hk0I30UTY
XhNZOQFsE+OTrcAF4QsHTez8cNm/UIYlvp6Gao5w2deWw3y4Fszk7DhnibrmjHZpM/fNdtN55nuontqW

s7HLmtzij1b0yRD2e0XGfDog+n1whYJXSkTLJUFrwlOQrIVlFmIu95HKNEpLhFZspvRULeb0qn7gJ4vk

VuoWghqxPkF36krWAnOv0KA3GypkEnhbi87bVhu8nC
TrMVat6BJRhtRrtLdFEHzCUGl4tbKSqQKM8ys9Ri4XDyjlOgq9dp0/rRizLkmTvbZz0WL9zYfggYykYY

idqGHVkumkwsD/AZvr18t6mmiTcrQObHP3vd001zqionuMC+zPf3DvIzek3wI9xms8vhjVL2EzjZv8SJ

ig0tPfyohFBkx4Wn21R8b3uzpYnBRSHL8S1aWgf5FS
LW1QZwmYoGZnzP45BoseQBgTOvQbrYUHenuiQdvTA3zLW6zMQ8oQ8sFZHByLM0FChcWzN46SDKexH2T8

us9wpYabslVzfpt4CQni7dba5kGh4AhgtSYf+EicBqc/vV2vuFVpkTUjVZOj4XczCt6QwPDM6ADIrKLb

toHWEZtY3erXaq6EJ5IafG3a3vlkyaLRWpZABSeI4H
CKsLFmHIHRcwfWz5bVziRkkQmUmXk3Ml5CFRrDxaCve5e9teKe/WJHxULyvPkwqq35WwS6JOV4DOV3tc

nqqphY61e7Y2Rrr185FHuiOCCrENxYHzevg+/6H9ujd1rKfA+mHA2FwjEHz1DL5WdBy/e+NIFNUnG0vj

kd3NXxD7T27Cy2TXD+KD88a74I1MENgQrE0zyC7Y1P
EfftDGyYoqGzd4MHSUZT4r4ZmQ3cTBt3yjra7+ShSPWbP2hu0xzzMvxApTxe2+yg4rAMixzFpik1DuDC

1W1KaLKABPZQ4I/2kBMYyzWml4b/Iw+gE32oFA4fOpEpI49N9GzFIZyIiI+CZ+nVn29NckJnyBD1yuhK

zXOqAQWzTqkIJul4eHnRJhEKobyaw/dGKcxubYKHbx
q84j0pS1fDsnhVWyVCsF6xIeKNC2mb+jU3B5vKXgo1gYh+zS8cTjbsqgmKNMOCXwgAim3qf+GqPIGIFq

cWqJRxhmyO/c2yAuidHGXrXm5aivCWtC0OS7Vf0PGUjOzzDlKaV6qpkpDw9MlSbgeYj65+9xZyhL2MpA

zM9qYrqfXUuv2vVzACDsHoVTLm3w21j7aYhZUrkoUZ
/GdUhegAn1Nv0ZnOBCik9Ig7pnCneTAtgnQda/kh2pZ94Odg9dxGNTFaXeyHwrRK3YUgWyCZbvvCcaSA

YsJHf8EAVdWSsxN1HBgfCsIjEkyufpR/BN/AmAv98sIMsISOsi6GJ/g8DGakH/FY2xsROuyrenyKydWo

1W49tkZ6L4+6MJUV4aRZKWRrVIDRgrzD15fxnXnpuQ
U3vRkW0Lv6l94Rla5p9sYS6yq1yt5EatsDMlDK5WThsS8x5twvfRjjizpFb5v8TMd/MF96M+j/N60y1H

h2uXA0m213Od+JaaEFbbd6atSboi8CKHCTEa8UY0Rf0XQ2Z0wAVWLopKn7mkvvQUiSGkSu6dzQtoqE8x

yXKH6/+1KyXXzeSg1d4jzyelEitIHCFkBcP8TID1R/
12Ftu5/nZiqukzdYTHPvwouKHIsHgXmyJAvS0trEjnWLH2CRic8x2r+s9WL4N4wmX18fcnYMLvpc6DBr

XrF+2+FKns38jiw1WOGmuqSQ8nXfBBRZ+pS78qdhKw+huj15kNfg3zUIb4QJ28ZN3eCH7kUSBGfQB2Uh

XHbXg1z+3jIrgrbqu8jgfet+qqFJ/4lIS90KhRcds1
XBa8BU3GYW4na51wOcQJs7aTWyPbEERVG7XbS/tLEB/b2EJBiUvLPuJ1jLX1KBMUBtbippLzPnC0t8f9

IN3HYvAQUxnuFzosp3t2uLPsRuXpj6vWYmz5wx2vhskg/8qhAUtIypVMDrvGlMRATGsQ9WSl4PoyIgPA

Va+ON8xP0gCCNHXleoSFxyY0QycPFo2Jnp9/wv02Gl
WvzcsNtcugQNXC4EncHg3a4L64xHDh2qEX4sx+z0+LSkVv7/P/1J3Ih3jvwR1ZXq3FsTTsPCl/AoqcPc

eLGEDwbj4obtkyqlf4bDR42aiZI7tp8a8YHU2oRt+4qPHvTh6i5QWPmG4vfsWs/Maz1YET27RmA7Ptiu

eLO5rNY94JP4Ofp8PUp+FZ/2tDoYW0JXo33QcQbSN5
cWEp7UxKJ/bzvgO61vpquv6EtQBbEC62CPZ85mPUoNzHNgmoAhNnJxKh8xFvkiIfngRxe2xd8051ATEv

Qr3tl7xfhpN5GdZ29dNXf3J+fxcpZMf1Z2+9gwERqe2yJa7vp9HlUzaJxqRbZbTeMviauWef7o1V1soh

g7TNuF7ywgSLhMj9QpqhmDckVxTF4leTWKNITGXNjz
OJaJjQU/74uNkU4ETjr8/0H5EGQ93LMIt4tSxXDSiVtGdnZd4ir2JdSv/c0vuSU023wgpthZDvqJpaj/

92GhhfdqVSIwETHTiXdWAVg0so73cqalIEmg8Kx/hJDkVYdZeg+3jQx8RkJFasAGJjpXheqUDl/uFhcf

m/MyT+4iZcSWNNeHuraRy55x818uBiRYgvwKYQqfQy
2wvamk7uHufow99/4NmCyVb1ReXo/R45GxIzjt8kscoUpytn7dOHUqkmjDGDZdE95n5pfle12BxfsnYK

gtSliHFWxkFa5vaGqMqFvTRd7LJUC7JyCrbGrtbECWcozxqBiAy8ACQz90UfSA9UIvzGiXSp0OgU2LDu

UmC6egiCnxTg6uLKTiV6E8klXRa8+ImekqZBpHl1Fo
uLKxjFb4hqY7DR0AyHp0hWtIAU1E266fGUBAet8MCEvSXupOEP5yoy3WxKV/co3bIFvG+QVyApsjmlxM

ESBbculrbowtKAp8Nr5dR3MKJDh6H4ADcndX8PaNY16ZD+SWrtMpEX991XtKKHud8LcnsFwEn16OW0Jq

7n4hb0UZJ4TKtM3+h3bEHkBylqQiaxzWM6X7OkPr12
ooyNqi10Tc+7sbGrLoa192AD/ENogXzd7vhMpyd0RByCtGvbLnOegsa+OKh30vkZ3XS8kr2Bgt2HWreK

brXfeb4EUzao8wAht0iQcKw8e2jMZs54AQVB/HUS6fpmLioy8JOypjOYZdTFPGKQLnavC+znFPxmqFhf

UfEA88M+ybs/dMB0PJE+uzAIJYwuIXAUGs8WbVLmzT
tVNcphq5DNJbRZjekgFle9h/29ezKxYd9qaAZGn/470UXmM577Z7Seym+TVSb3/jTv3Mj7yFE/DO38RQ

VEzKl1IDUqvnJ+0kraekzshb7Ozspt2Frkgnu7xi7y/K1V2OY1y05sgSVHADvNz0gxQ9gNfwHKLGPwtF

3vjmsfmI+rZL8horcPQVTN4h3Ea10NS7dOXKkzOwAu
doeP904PQeKkru9irkiIu8C3ecqmydB39mhPxQRqwP+TM8nzradHU5LUresfV8UQcLe326TC4z0N90zQ

weEbL4VYsB5lG8cwXUfgYsNjnsVUw8P74QQAU3alApPFnmYOkG4I4bCbC03+fofIbhh00s0nB1A2uLzm

Ghq8O4xBGGmZup/RtXpqQsJzmhFSq53yOtcioA4L/x
onv2PhEuT9n0rtpVjgy/o8lydy/abVQYyi66u1bz+oEaiN/ipadACaNtzAjUA2mD+iemtxbg8wPiUyNJ

Am+BEBtJGhL7HW/pAnK0ls0UzIUgIiYf6A83iBSFsHKJJLUKtgFgqcfzBM7OEKPfoVWcRG4vV+vuEea3

zkRDiIT0s/nwGFrVHCbvb4xj0tPvnlvuGJluGY5DXq
yN+4qiEufmzy9VFWYYAPOHf9w+RUt9kst1vZkyNKJxvwB9q34vLfsaiynkPHuzN13gmJqN2Zr1BlcFR7

2NnRED+PW9yhKFhMZDEfGxlr8XkctLxxz1ny4/gNg1Jtb9LtcxDR/hSeku1O6lh0rzTr3e/AuUldLXCo

nezmuTw4HQbWssQUyPpX2u7x8gJIzfOxnHAOONNtbd
MPoHwP33p9bDH5l9IP0tJch7g7mZKLqgDYxahn4T5pNWznnxJ5iaD4zz9uGHzqpc0Rb1zS4ji4eOv/tz

QaUGN6Mzt1BiX8j7mKiZtGdFzyoZPcbpf+gTMvHMPMbGk+AMf8jtyjGjADPA2J0wqHHnCfCV0tKjZsJD

o3XjCUCOfMco0bOQ0OQRZzxIAHRe2nyhUwEB1+A9UC
PN1jhdRmCK7SsJFTVfh/csB8CbparaYSOJx+EnZY3aN8pa8f79d1YJP8qWr01HEwVF/l+TEAb5AezniY

l5/Dw4nvDzt1FQCxwwO5x9TYL7XcufxGm0IfaWrT0v4gcaSizXoS44fT0DRUEF6VfAsZWaoAcSfrJqRh

aYvI+ERJxaZ/3g0uCB6Lz0P7o1GLsaZKpfctMvU57A
p1Z0oWC8HrhEaNasIL9PDhbz/PFi/OMNLx2UeUvtbB6Gc9Xk95IxkhUI0uIvdcxBeSxwgRo8F2pY5zll

viXqIueEsFYTNmARyooUxc6gdmCZrREwry3RJc+KZUOxLNOmaQ1eTkc3A1onjWWShnAGlZgSYnaH2Zsn

akKNlINgOy4N3MTI72+isExzzt9o61+0eomgXM923U
rBKUn6ppv8gn1+J542Yvh210Kjwi8uhP4fp/rRv//+cEcNlOl70/jllBNVU0FNks9lM5lTUUwEOUpshl

R8KRDFjcu0uWPNYygDaO7tNncW85RwHN5ST9HZ6+1hUZ7X93dXOt5XNNDKy/gJ/SP6IlCtw0RLiuAaXP

UKEIdwBscwIY6cqh0QkOW8OOM9jJ0xuznTso2Sgc14
VQvg0dMLpgDX+ZGkCBWn6pm2Li2JqpuzMnRRAw+d5v/YlIyrLvdjWzDG/Fzii1l9kFeBE2KFxQjFuuTx

17i4cE5Vq4PcS3tqb7df/SomPhV+Po+8L7nRdFy8dgnbN5GMxo2CMgfzZAM5sA6XE92NMoEcOE4Z4Elj

Gfcm94FjQEI1soj/2XBWRj+LkZolihIyyPgESMfSHD
LPNNSMSwjPCihzmzsRO1+uxXoKsrITfb/RTTG8Y7sl5nybCgVWNAEx8xVsVLUUWPYykUUy1DTqboOLIx

aJ6unGNkTGhEGUpQ8wvCtg3xm0QWdPQ0sRJ7sndIGXpclG5Ok4XWxqfx0KiKPAUt3Vpr/d96pFDSSUoy

AqaeqnFfMwPRR1ujc+ygjvwMmOcOE5483KDy7F1a5E
+3wplCBWRCijS44WxgaegDlvEyEYo4cHCWiHxFnTt7Y08n3aokFsn0gRzNb+XekQR3/8qX+mvTpMwYm2

llu9x1Dp3LfZDDqgXZY26MZsaAYZNwvAXTsikWK+1zt0sxj6thPicWq1Vt+rsWp9/XKOiiWiIuGE3+DG

+PqZ4ngeyhUzl1PbMB87QdxQSQnvEflDDA5cvrIeXD
qSyDLlP3S7A0bbXAzg7mBZ2X9V0XCrfPS+AhDgOiQMuxGOfoMH2JjTxxkItRx5sz5aoyAq+/QwphWTw8

s4XtHOli6cOIAYdmE4LmNRqYi4sp5jweB+HjWrulsYjY3zlOTonOAZVOkGiN/JymvzB/sMbDjEXCAfH6

lmypx0UFrof//Y1VMyC93ir1NGg7FiC3Rtuc3Ork36
34IARApu9GBrdRnwuFG3BB+5Ep6Az7Ncu7QWPMNLIMwSDG6kSFsZ0Q1khkd61jmxmxP1Fsjp7VO3vBFG

0lswer2cb/L4r/s91pM6XPWOlCTOQCGRyp8pC7mWJz8ZhsVIbxxvYLa/k30fXRAYaH1MOl09zG7b7Wwk

8Tb69Xd7+kTTe5f8cLukNzBwvHnOmSlYOGXZUzxERd
IpijwJkDvjXVNtU+3ZyQpSixWH1g29V+HK31dya+z6FaF9hFVf1RXPjhEV3e/xbu4ibDf/9uvLfDhtj7

ephth02JkYsOdjyn0fsXMWYI5pbiDxbYuL5CuSTozwB41Ye196+66KIB5+FVgyjVnK3rUhPjM1EFhR3d

puJ8WErv/MBIU6yu/SNApymikLh0IAvgH0JPSTevnx
iMACdqRZMFFm5awyp2byztyx7J59mrusnS6Yy46kGYIAr1PH4Vy7yegJNm56zBwANfp4kamt2FDBlRni

Y95ZbXufSVQxJP6FtqrIpF51uT2heyi3tH8NZ37UgrbqbYMRuI+gG9aSK7HRWPYqsSVK8cX9vnuHAHyJ

M4i04tV91SmY+fifSb31oFZk5DULRekUPmstVk7IKL
9p/mjfO42ai1ltUlcs+M+jbqI9sABVnLv4PgHiqpiVItnuKn5MiK6XC9I2afje6xj2868YWpxQySH7B8

ynHxC14Jy2jGrTwKLZnGfucN99OLst3poXtWRATPGMyZy1Q51tQPNhfEJvSUqzQjMo1GyJ4ShpO5OFqV

VCaS6THt2a+OeYscfurd76MBp/Rz9Nhd3su72Bq321
euTHff5WL8xpIlKgi9BDYAEdvQEJMlw+T/zcTN/+L/OTANs/nnxmUIzOSaFK5kt6atBok/MUYRsCCUaC

936/iufUI5fgd5qFGqFLvNrokp7oHjjtCmQNhy3QOb6AaHC+T9Y6YGj4EajHAakI5oYQ2u5Pn/YW6NKU

oJMRSG78mdkSAtQDtxVhoqDWtHGOOyEB1N1F2UPYb+
5NKSFiGDHKLOgoMKQ1CJhl26ampLw3EeSHxQAF1vowS1S3vLVzAIAfqLh49nGJTikYw1cw5+H/hc4YEK

6CsvOCJbAILvPJNB2V/tON/dZX4sXSoa/Z0El8jUAcdnIGdI/paDqt/bvK85wPXj7ZsLXZi3rMcJeGXH

MPBNGtQHt7S6w7nkFTJjUG8F3t2C0s5R8MsVlXz6kq
JfvsffsXiTynzYkT3iOd8PpgoEcPj9h/RGiGPGxn8uo5zcXxVYSuzEW5cQOkMsGUls37p4N5yvIKyZZS

cnWeqfgE9R2kJtJYHjEroQT2choGwCq5P4M7/B8r3qIo8qBOcFy8BB5tOAQJT4rD7Dy8w7ETqwn/KfR4

KatYGXr6bPvjSHohFGQkcgExO0fsewq82Jukv478pi
h+fpUpSeVUnyprTYyA5oL9W6M5/VtwhFnvlMoV3I2iEEIN4ylYwvy3/3YUpoSr9pvW+fclX6Gh5Eb8c4

yqAXu7bjS9OE/U9uIwcIjx58S76uBhOF/xx5+2JxqLuoSdWcDoUEfdWVPRJb2HhZinn2v3ke8h8qygQ0

N9+uKtgNX6djvgRLXGjXR3x29rfWZ/OC4i1p9snGIc
fbz2be7KzCAnj7+PrIgy7EDYz8iYgBQmYYDIVIbD7umkTM+cYh9aOuDlEeHaMRihMpj65vW+nGUFWJ9r

39orx3EB069X7vOV9puA+tBcYqsyahpFnwO1Ckr13X34MxS4GeVA7rK7OeXelfR9F/p2Qak72jR4MQnb

vG5jvNM/OWucpEUa5DBfG+qX7GYQOL8tbRSBhj8yDd
DDY8yHq6k0yAgaQbTqH99UrNlYOUQOd7Ia5IryLO8LRKII5m9pPEG7KIDQRIxaVlkDwcJ5Axk17G/Zy0

TWmpXux7ro0dzAxoKOEK2CqOzXL7jUh0iYqZWEldo7TWUh4G8GqJ9vKA0AQWRW8f4yicLetzlRhCoEKX

u02TvbA9vL4fgI4M9jTt2l7QOAy595PNuxUvB+rUWh
Zf2gyN2yyRwMQtqfgtOcAeKTSacnGrcb9RYi+lj0wt1fn3ZUOAcYCMIkJk/yPj1Romibqhp+g2nBr13Y

V2a0BN5YA0mQ/J2C0dlLsQ3E7b2Rkk0tyhZ+tvJylwY/3M6fLedN7yTQjrc6bcCgR71GpCClhhpVl0sT

hfAhFP8Lnri7gCcftsIIF4i0ujomjq0PM1K/ki/HVk
c2kIHuKbfgRWojoieHeWJH+G1IXZJ7YstyTCi6sjpjzIbZ3SOFYhtp+t4UA0wrjbYnb1km9WFc+YAaG9

D77CJJCrwTyD38AcB4YEvTM59QNp1H/o1LHbqUmQUoO/HKNtFr6PZZCqV3t3n7nuEkGwpinY5dLvNc8X

9ckMnv/IJW3M6kPPf0/KsqmQaygXzsHIG6lrF9UdTd
y0suh4ou7dE4mlwSl30Cl9XFrEOJ0LXwf0nr+72Gkyruk6r41KFqlkamwOu7s3XYHENfm7UzppurpmOI

wnTEAMK6BOj1I/3hVVQ7dHYM0jKdZNEi+cbm5FIbydhkxtEbFROornKOrvBDxjiiyrbE1+YeP23Z+BwX

4R4XzCZK5dgDni7u0viwuLgWuNenP2Di6fV6I4mkW7
5qGyVBA/PFReQBnTtjZcQRCVd1PS8eyEd+n+UAPJvTNPBrGnmNKCd48p10mgPjhrDLMAWIc9+MmSEEZh

tC7pDBuvOHld7t+zI4mhcQRiLmt8M6uc61WZrb6lOm4hYiuBKyrqByN5B/v859OZ3fIwg/Akp+Z592r5

0it2MeVqNU/miAy1XPP5tplw1gVy9eFcQs9CS50pVQ
DXNGZN9MHVCV/EQMamPVw//BCSFLLKBa8AWAs5rONaTFyxefdivNnACptTsqzBh9pI8hEQZwyu7ncFi1

XS0AUqkJAywLkZpFyircy3S16cico6doavEIo0ipGo59ab/OYW+a/AWS3fB7Wjr5IYEN9F72LVfoOGIm

A3kiVK1LPbJ76kvivmSvctOo3OKisXSlOOTgLAPcGv
2E1KTCslqwcWdKEN9PGodRWw6DkAbb4s5YUeI5oynd+xGdq7rBkYuG5qNgNWs2n1MkjRPcZuRVIJkIDd

j8/ncQI+1Rij1dP+fjWMbz8K98re0dVWqjORiPwlOF0+u+EaR1uOL1NSjtIRXAZYUPneokKVK5UoFGAQ

pTI+y3DGBnLmeh/L8nk3MYHafLGAtxWMH52lnDH396
KLJ+tEethva+873cJ/pc7/Lwg5abMXnRbMbNk/lBARr6Q/Vi55BStdxbqO6/ZB/4X4BZKZyAEwQXvYQK

9WPMSZT9zpl9T+/Hh5rPPsq0ta+AZGMgcEiVfeA3iobCHHFM6qA8MrO789UhyEBYCBO+y0Pv0iPr+BzH

8x38YqosbqTvmjLny1Z/TupdPWPcmtBEGUs/mez15r
Oo9xQjp5g60DDIAG/mcTnjOSZF9QpnlRa2fdnEfsE76Ty1uoNDocU3SKjKel8p6T4YpGieYNxoSpbZRF

tvVIindqPHibsReI+pOzXZRixteNZ+jFsiNFlFGSuqKihhV0FhFRTx5QzX7gfwVFmKpSKA2qHlTvSUOC

u00E59MFBvKlMdKBach/1jbHfpgyLHmLp7SGbDmZSO
k0czAjxkkr7sxfgalhs1huoHuPUmnM67o+O4wNpvLhqytFi1ccuIMMe9f4wlx/yEGScNywC+8elHHLW0

ZV0fg7DY2XgZGurYMqsVSjJj/LWLmib3ZK2Ms99QzH01a9V5ojuTvhkaSwd/lqNTgzwQCcmXiosM+dfd

Y3G99dTiR3g+IhUGY9o9DNi1wp8O8TykB0bn0zl/H5
AN1Ba4j+IilZ02sAJWXVR+O48jQRCkwFmyz1OE5VCiHSvprFa782LWPFy/hY5EVL8k8+KaO5aBgDqUmA

p76OpDim5WwmqGWluI6uyxMSaxZJEDv+1R/AleEYyU69Goyn+/exXMmWJoLCKeIrZj0FzYVOIpDRR29k

CZne0TEr1vRnQTU4SlLoHPWvZ0e2jrilDP7ojkC0D2
E990JNFY44s2Qm2VB8pbOcWuvsyP35Kiwb39p6UufWTqc1GfFXG+KkcPTydxeRMKIOL9+5w2uXy0Z1O/

1KXgAxYuATIxPHtx2iDWFfSavL0yipU7OoD2Yt4lY1AI9YnDWCRItOYupNV29i+uk2d+MfsMbD5MUVof

N/EDBFhyQd69UGyGEP7cAQrlbtXWoSRAOmh0JDuN25
6l6QYDLFUQdKDKnuuRAwgBJkx3PZ6VRzkfy4RNhLO/bmAQqbJ/oiKM3R8AWsniolOxkP1GQzBNnLVwJS

jiKHSnOyiVNGEMvQyq9HKPgV8c9s9RWy85iYLHiUzyu3WQ1FsLr7lnUG9Jcvdui0b5ez8ZmKIcIIiAol

cOkbCvtGuV75WQhxhF3CXx7bY3565097GJU7jqWgzT
DzXiVpRGuO7UjRwj94ukyPbVcnXHZAofXhrcrbNeoy6ujd29SC7w/YIhEhLAK+VQz26baPJDPtNKfNtf

5SAmdndrGUsEIljBil9FWxC+b+T27a/J4g5DxhbwkpRX/TaxGYq/E0bm28vDx142i05UpviYqDCLjSrB

PJaU2aQdRztgqTw5LUciyjqYEbMASkatZkkaa3E5ZR
5h/d1OqIBw3NlIZgeOOjjzIZ9j5x5DzRpKMeSYin5Fq0dPZ/oXHDqaaAfKTLxSvnB+V2S/u4/2r4FbTo

2Ny0/Gyx4e4x89xB9yp0e5z3SAmaI2u7HKniSAKcdcrErUKm12dq8os7xSnOpV/xiB7Bx6A23Rlizeh4

FPawJu3X0+gpchCBf6maK265x4vrzD23fMiL5EgBU/
51I3aaNGByH6MANffBPa+nxSRQPAvVTosUTDum7Uk5s3VsYBZLKyKb+c6AuAKu6kwLihp4OfMO0rEP62

tAOx3KSF6euCEx5X91T+c2cbb6K7WzHkW28vZs/5nZ7p6cYxzv9G806ZzkzqBkdZxVrGPkt/KBbpAInc

YIA6biwRL0kNEJaLnvGPbuQCS/LmdKKQXeFtscnoZn
RjSlRn1Dhi08UfsXz9580AefmP9sfOS8/4TLvBVlWKkmx2QGQfmNnoHtJ9jjXMbMei9Fd2v47A0yA5a5

qIrodkSR6wOzfMThb6hUHS0mopCDlFtUlqKwKYajAdOG4mkK1dQy9nIERl6PjAZFTSz/ruxK2Tc0vR7E

L0SgqSmAn+7Eu+IypEMx2tk4HWMNclURxJvJUgkJx3
EyAaqsOF7bZ02C5dvJk59ukQGnVfijzd17hTs6zwPAyoOO9pAvjfIEV9YPlkKJrVYy2mWHHfoxGkVZTf

ERNdq4OGNtwgpqT8nQ/pKYLnHs7koeE/OCKWW2MfsMt+ljTE2zfWX81vh4KFQdDuNbw5bCo9CbHXSBdW

W+sO7T0hWUvci2+YJj+G7ZW3xN5qiWKd8BszOeTp91
pP4Z5edqlTrkxZzCgtzN54PeUXyF0+gxX18aO3m3NP41Dp+InP/Zb9zjSKjEdP9M/1vAHReqsKCHuNxo

nbUacZxwcn3utGmcm4BK7fHlqj/Lq1+3BZKB7dcNRK26Xzh8+94IX0vMnhNhap2obtJUgG9qeDT+JV7D

UFCe7OCeyVjlO+pi64BiW3WNl3OqY17RVYF+/k/R+U
UrhBVHjAW4v3wxlSkcRNGyzkEi4N+jDtueE7ffvCsj4PXaG/0alY28jAjg6jg7d3j0lyqiFkoxRQ79YJ

NtGv27BNo7Y44uaYdLv7E7/U3EPoE8FqSHtAzk7K8o8t0acpfiy+4cYkrBmEvEAq8jloFgXzsRmzOAhf

z83VcXayUVALC5Zx4RlwkyGEUYTuRqSo0WEB5/9z3P
PH/RSVLXH24T59TeG6jebDfdu/1Z++i7Mkyoo0YmMSEROvSawdi+IFPLoajoBpyl5vXj1KYF3c/r98m4

iijHoC9tDLAVZrQjSwdXr6Rz+SZU3oXF3AEV+PqUHjToJgZ1KAQD6Kr+dcPNTQ40aLV5sy2Hzt4ubqUG

zCd6PdtGA7PUml9KpTnQCadWJ/Hq+xjObfP2BpKW+u
DMLRqr4PjrdeDApaYiK0140e5M/sEoScaNm42UN4+zx9YaO82I4jS6aWjTkSt1mD6z8CJeeTWJlCX7dL

WJm+LNQ9GhK7nqk/JCGA9h0+ds/id3Iq66DNAieRabD/WBRZz8Gzx4svdkVrSBfLqcMN5SOlcG9AsVpv

NruYE6O4BSGdt/frhViF8dBBTWYIu7PXjbXICWH/aE
HG0PF21hPC64loi3h8K2BlU3eqeufl9tt/qM9RPzhEEibfV+9fXipfBOU7qW3PrKOHqE+v5VAG81VbBu

o6k+84dY81fxIHu3gyLtImfRNTabNcIcT8SLcrOFxauyu6jyx9IdQmquo2faDmHyoHDPoqU7K/cGqsKs

xX23ELHg68QH9IclwutWbpaQ9BUogrxmj+mXbhk+vt
xX+/zpsnDW9fmYvaOVbRdN5qo0ihk+P9ardvh0nCV7mbfA+ptwcI951s8BNa7SaG6+f5XKdQOOKZB9B3

/CMcYinjaE/39+Hs7gt49KWt+qtcnBxpD4w0MaXlm3OJtSqdMNsLY7eAzKwtrts4R0VVJC7Xdt1t/56i

MwGcjLNEhfcB94wQvKL6O9wE+VhD+qgAaR25ImTgIu
AupHa9bzQWDyeMezMJjKz4H3i9C0v2/7eM5Vb76LW3g4yNZSlVFa/iMDBQJ0liw/nLPhUwLrKwJu7r27

4zh3TCFqfto8dC2Zc1WO8YJh5k0r0nQpcqiil/dVZry2C1/rVbbDAGH+M1Z/yKlbEXv54+AigtINoDqz

+ope8Bs+JW2pI5hdWFGN6LoE/Q6/ygRSBk6R1Ajd41
n2H37Gon+XJzAqO3PiT9l7n9XJIkYOAcFQtN/mcNbZ7P7AA4sMJXvJo19OsXCt0vWTIcWYbWMHwHk2IW

QR6IxzpTxQP+Vd63DRUFjOfxtiCU/BtJ5HtVJQojvw5m3wHxtkVA8cQI2y0eQmlyfqYn6F9F9MQl41rs

Wuf8/+QrahOr4tXt8fBdc+4JU1XBcyLV0P6BhXBOV1
ThldyJEKjwSjXzH4jdZNTMjW7o26+dL3wxL+o3EzRwH62ITqNNy5tuJkYIK0B2gvC14hklDG953YGwdU

tYuu4goe2ZGNKNof5OW3ZmI2OdjqQOXIYUUcPX7ioe+tavaRdJkqE9zVBV5IfXX5W+tiH+CK2drF8+2d

h7DpJTnyv30P5U9Y5+w7LN1xtXhaQEOx4IkLNyMdV/
XuDeQX301Vqj5duPKsS2sNCtZQZ48PNI3vMBfjm1gDvCxoaJaL45wifwHgrHm1KLBXCnsnNx9YVu2e8+

LSAmyyT44N+4GJGKB1CMrpPkYWsN5Xb3BVDU/y4obPuBl36DOorgcFnIqoeHXN4Ah1/SHedMhuPkfwbh

dafWkxluv4tF7o1myl6dHrkZ5R92kwWJkqj3q12Y7/
uc3bV5CX5loi7+ivHVriVkYHdYHO2480YtCECzM4KyUepYCeEVOAzH++c0Txk0Qa6em/mj1A9jKz3GRJ

uS4XnYdbw2cPIz4ARnbWnXGxQobOBOv+KlcrsWmsmMm7qWG97uiFXT9wbkhRXXTfacw53O3GOr38TFEl

dsnXgvM98aL5PtaaK0H8irDE8j0m4G3kLqvyb7n5oi
UVuoUIgMVyzZoZCjP6Z1mvuplQWNxqEicS1CMkeeP8qIhasPyFKM2/tdd8j3cHoFDhdiRTNaUOXzb53G

bc7eAgLJzvDxOoQfdZVsMlfMiZkSwsb7Tl/0z9QXjGR0mJ/txpMc9xJvz0oyav+EzNLD+Up3EhcV2qPP

KHrlSoqawBrr8Tc/OPcna1GnmJ9MF7nTk9u9/WiQSF
v/7hZ6vMGKdpZn1XyghfVSwV9WJF7v6jK1KivXXFIM1intNuhKDRQqXzwT07qHi/5XENA9K0GpYe8WnJ

zfxJxGClAsVdljfYH7/z8UR6iceln/kVXx5IRJoEuFPcw9g7vsDsqpPFpXS/UA9sUbNbMdIzrhZZOWUq

+rMqMxTCiBqOytyasf79LFknBaZGYVGD2E7NfT4LWn
mm1QLJ4KnDvBwKZWoKkdjdJv+nOOWPIq2Vgdgr/h1831v5C9a2mw3LsncjVQlAE+xpY5UxfCQUSMlmmv

u4GvqWztAyZiahmRhBJZkTNEcM1AQayw4n56eN4rxFQ1EwP2Z3zkkyH72DB4d/CGcJkb74p4AOwgLIYM

6lVvS7W+e5/yf+ex3wVAW85oHXAXDz8NLAuhUTPuXQ
+2vLEt1v3UARyoXBWJ+JtfzhDBydPlFY03mg9ky6hkIQ0uX0r55NUThS/7X7ZoRrouybEsoxCYMoSCa7

/b1zlISFlAnItEKkwirzZcCpAXbGYOp7Kz1p+47jhhNCbI8/hk2trESliZpO6vw5RhuyUyTUs5+srieZ

4BcjGcJY2MF//LZtzQs5pLi9pQOtu55+BJ+/GJyu1L
Fc3WL7X3oaCm1y6OAdRqCUosgZ8W2WePCA6oeFdkwTU6yNCQ+K3kJAqMA5zPYOtOao1Ron45i2r6bSnG

g9KVKz2SnndMLj9lKL19RDu+kJpMySSxCTdzmLAixZYO+kymBntBFgwoXRnfkue8lQSuJ0g8ZFOlgpFT

tmOQpArZCEZ27r7VkKPIBLq/6rXM7u0/Swkjq5qnuW
GR9EJ2lkL7U2mph5SNQRrI2Lx53cXItlIRAkH5Ky9a/H16AZosqn15ylbTVNHCtEMyEW2s9q+hfZDmpJ

UcrTd3n3vNhN+Y0Xn4LgUVYlrfrEol71j2fTttf8z9iwLBIq0GZA6M8WXv5zoUBuLn2DAae2BTCVkC99

mPzOe5dPn/8TttdzIIy6V5J2Kjl706hmNEMJI0wCMC
mn092aEN7VlZrrqqxjU3npKbjWITWoTHX0zlhHo1C4Q9fORVexQxSHTtC2UpVi1Syi59lo5RlnNUBHkR

q7AyhtwOdWGONCxt4betIEWBvtCsRbv8GzDA+0akgw93Oza4ehkhmgZ2Smq5vb65fNkZ/Xzp6ceqleyl

+uWo5ifyfrGNv6KmyhY71W/hrW1bgYpKmV0QrtfVL7
0UAQiJbmJBNXnsGS3lc7OKuDp3IVOB3yhjhbuQoU5dO6r3WXzwppRLf6fGjwIfnttrX1nH/f0qFkqKU0

vivF7VhMN7hJESDoix8isxE/kqvx+R7dc6H+wiDEyGh8g/pZH7CsutOTQALsLb72z0gtQJ/i8xSrzkmH

yS2NVcRVkn6ZkMkWV/Wxe13UJUsRG+OqR25C4Iho1g
WnTFa3tqSMzVrNULt3nLRjVqVv4PNFqZxIbbfCoQdVQZKMdjzHAQfTdImKteKo51Q2K7wl4RLehmLcpg

tPVMxAzlPRkUAgPjc7GbzZ6mA8d3zLoqgM7ZWWLSUhZA8SKW90F+9b/IdanDM0YyUU1V79yK3zH9gKyq

YUn3CTSyXUDoWCaO8rJOpTEYQPhHV9H3DMn3Oyd23/
Gwlw9O+8QevfDEPEhEupsLS8w6j/GV0Oh/+GgUw/vcD7f+wiqyJI+njR+vBMtQxilMMa5pT5Gxy2E7B4

BpSY83v3liVT0DFsX1i8ee9Vc4FcTMIiPdpjxlvFAhbddqM+APWKfb7uPRD7DJOUkvUu4U1NuADJ5T8C

/VZOk0kYes3LJLM+jt3bneAZ6pGSkObQVI4Qu/4Lx1
qRt7JjRiYRWQEGmW2x4umpx3DcHdQwrfzHgartP91cae4Fq9s33qngro91Djxfn+PsE49dYZ2Zn2WdFp

zYfelsMK8Dbg0+kvJer2e7TSGRyXzcBgn14GedqGkrWQgN8H1ANYkLTDkXBcdROyAGhzwZw/hWNwSwDa

ocB6D04PCS3CSyZSV4eySHN/+NU9ASV0KfZLo24FM9
Ocd9p211S2i5XGzT3a5XYku1ah8/L/mA6ufkZByb81d6cN/+G9J1Ai7U7Z+PG0Q2pW1iUs8/qZ9AIQhj

d2SLz1rAKD++o4wafUWzwg0FIrVIr8OPB3wJhqa87y7RtZeKUb/0PdfMErU76P+dpe7sydg87FqXP7l5

ruWV2l0BFwNV2nxB5nk70G2TeFp9JYN6cEsmWPHj4+
Qs6fOhw3/CcdS7tbi/fFYpevW9B7LDXxLjW2RgqTp9B0r7aWgJbOgDFwi+c2THQ5cy+o70Ei/A6K+Rs4

yz92Oin5rzN0uqfeq45PuKLDgSCBSVHFGr87oREMqe/ygmCkYMVLRI0iHSYEJLxtT3Vk74io49ZNNL1g

N3uS/bjs9qYyksGVKQB7tVKL2AtaABbHPTlhsSkojV
neDYmZJaVbnDu/o6yMWj89Ys1R8k1Mc6C1V/2ZVrQAg1OQb1n0VQMQhaWx5DAZo0bXhE2gxYvTpR6jd8

d1cmjU9BTW0bMcOtQ39qgbXwgD/9kjPI114Y99fIp5e19e+bJ/e2LVLoljse4w7iMo5N104q2ATXJQat

NjZV5++2MS+3JDCdyos+2xRZuBmKeiKSpwHQ7afZlr
FDsMgA1wI3k6UNHm8mBp0wKBCmMMAXTc1+MmECpWO+E9IZkYrI/9EOrpKSlf+XMLNdupu8/1SBAWY3hQ

IWZJIOtQse6rXb3/LEwmDGbz45yOFrtAyhew+rDTaR4f8NxI3cclZ+WqyRO5qECoPLl+raenxugZiHAd

aVRExDQa4/jRGQYC0Fknj8htdDSp6UsbmOdB5eTIz6
ysum2bN5MDG6mMMw1ptAgKAaU9TiSqJct3fF8zL5GNu40yNlOdA4cV7nxcMQf1qlPm4p/nx6oeZSa42g

UkYJEe9NBc+54+UXpSfL4Zexxy3xPRmSfCto6hVg5rl0MfIY7cB8C1rjfst59GWffrOGDc7i5LkA5Z0o

Wr+oUM43L4BO2+oOMGq1VPB0RqwH8z30ZZlBxSYmlt
2Q4Od78r2iu88x8UmiJXQYSU+e65wR5IoQZwjKOooLTpWg9uGPDEMt/QUjf0V5YDsJOAkF4GkjxE6VeO

Abigail+zyhOi1mzjEmCqSJar/iXC6Uemqg7kEIWJdb2PwaYPp8Vs4NLb2+qTV3mPHj1VEl5W0x4WLe2mPj

vSTTqzg6oevmUEIBonslzOtm+7n0dqu45+G1CfCrjf
CxJ5WoXg7xHjtPDsqk1gujEISPFl91qvTa64fymCq6U34BRoM8Bn4yRta2DZLrsEXp3Mbc7yd9UWcyUC

36s2pM9bsnFTeJHAbmPQ0JSTTbBaj0H0hrm83W2Ghj6+9pp1a/fGlky71jL2oT4laUtE1W8XrPtVayK4

IpEszaZSXmWvYVIZYdFEAn9FuVSz2FkxV3eMI5pgLP
ZuJ/sCJDHATDu6bjvxPq+oFgsOdzhlkWttErFa3xG6fzQhNTPGThYgtdfudiZeZRHpiPfWu2A1j85Xzh

1dTktrM48flGJ8IpgSmVWNjjHyy+vfdIRa0wDpPJlTpX4U7jjbhF76R5flV+ES0+Wx9M7gaeDDh9SKbS

3Xg/TDE8u8vNTjZRlCrEwFx8yXyhIrx92V3ZLZYv/q
qT8t4UZpQLlELzhUiR4gljsp5m4wzh+/5TPB+yv1mvoEsvCZMql10SO/kse6C0lhDH1ulavymX9gRepJ

RCHr9sp+ySQiUEyfyOZfcrD0jtAohCQtoLIDb4p4W1qmyIghKEXk1GAnRGv2/d3P79I/QojQEbt0JLS9

qDOQuWIObDyeWlwl0KeBNzJtqF607g/Suo/3ixnHi/
5ZcYK4Z+ROjVFunxnaw2z2kJDx0Q6a3xIcFl5WotkIjfRN3GBvmpbli5ZKaOCDoC01XUJ2tQmh/2jl11

EsVslD1CWAKPE2l+bxVJSD/k9iyFElVL0gmACyXy/6FJj5sDx0fD9Pbp4nBG5lycL1pvYE2rF997v0l8

kpcsdd21lt5sqnE0dBQbRacRF2p79jlEcHmXga2TcL
LzAar0kCoNG5KCZT1FDPWmyibWOtO+GKLEA9wCQs1Q7uElaYeuGadhWvrcEUfpP6fOKZyj/5cW/8++9P

m6SmvnVvXdZV1Cp2aW7sVUajmFTrzIrCRHpH5BnkJlhJVkDzq4q5v4b0DZZpyMFa3CuG0A1cVTve2g8P

8hh9JoEzyKFs3SaWpaos1j2saWl71CajAE0dlH//Hs
Li3L1b3za4w4IxeCeln4RZvvOfG5ZFyi2YP6rCBFatfLlc+V3/pTAFG+h40W2pjyc3KLXS+XnZCmihc5

DYZIDymfijufCtiWOxTV/R3oE36xHyo/askCrF6IUFxsqnrbh8j33NM5NO0ydu9lEzE2cXqX+oPiqLwj

8I51qeyCUNX0R9WEyibJfFHnBwvVjdlIC4529wcTxk
1c9SZjsmKzfIftkvFh3nw1QC19Z8ZiL8E4PpZ+bd6hJYQgBz344vWK9W7vjGYDRX8/6fmk1DR2eIG96U

7LjOzt+Ovqfg/tMms0r12gJlbYQt85TMkqgGwMFDzUZVLxQnnc6g9C2+f3NwrkIbGfNPDSCvgOh7KZzu

pS48nwkDzgAGNVund/9XfnIxuunXif63KoPiCdYaS5
pJQ+bXXdQoSANNM6dFaEEeSRX90yWoI2Z7ed3PcNFoSk5vI5D+Em9fI2582hui0VULDjF98z3ckOdeew

GpcyRBRUxFWXtff493L95jrdYT3wzX+It/H+DfEp4nS/E4GzZZccfhL/g1kvuNkY8UY0hU/xN2ECOeA8

yr88P0Ee+O45HNjfLnLjm87f81Up+fWLextW9abM3Y
ZXaX+3GMecM4n4yhDur3XbXVc5iGjsnVsOwEqMkWBOInFnlwtM355rTrnKC3wtO268p6+9RUMBTLXlTW

puqWlWqzTxdtFMhIzQByLcL4gcpfcfLa8WF2Nl8lrB33/PweoebLM/S8rlyYkfsJ6TcD+bXKTcH4fxXg

/uNeyqHZ9cwFuJSfTYuahP2GGh0G9B7Ofq+8MD1dzF
p0zC20ZmsYR/B8ao+ngUSXWi72yOGzmV0CV84uIbzkij+Djol8SH61OW/PhOF2XUPmlx41zFOIh+Qqnp

C57Hz7BWsRouVcJnqtB5CU48yiCS24twvGLzc8cI/6sy3ETX25LKU0rjmeq+yOGAOTnyULuHnzH25kUI

cbqGu/jidzKgV/y5X+joewRej38dmEf2sphWUb1gq0
NGqgP1LffInRz89/HF+xKrGwO9AeuCVmn1tCozahrYPdw93N80jGt/a2mvlMJxG04gEuMGVvjqX08GkM

ff7qP0roH+hb6wHp5WogvH1o9HlFEMwqUTzXWZ1YaFNeAn0nexr+zcPyWxwjV0dBPncEB4dFPfmsLUYW

y9zt7j37CF7bTMbQKvdXi4THg+F3IFgX4KONM7lOcU
zwl1KxzIudC8jLTm/kx4jDivp0a53Tu2ydEDTUqWhyT0KNaxR5dm/eJUyWDy+Yw7U0JNCjII2GMkqxqp

ROKSxj5sueXRp8EORrq7PcsuJ849g9wiDMdezlpgqfYapn4ogPbUJKWEb3rOTq/bZdDPaUWpJEOvAuR/

yjgtkHn/JrttVdk+qGNC4vF2kxHnqkm+Dsu3wuSnJL
Ql8yS6PTTHHBTLdplPLQ17suTe5N7QCaIKw3mXzunnB9jt7npHbr5DjrzD8bS1qm/BR1f4fIuRyn1cdj

Kr+jpOIzhIi3chIFb7thm67kvWy41sz79IN94WeAUF+SSGY/2ahTqH2Cln8v7BbE5b3CC+SgqsBBSVag

VoPA0EJtyq6H0mCe8OknnTKTNRYofA/7LmhLx7qG4U
IDsu/cg9jqFOJ+vbC49q2IO/PBkpCfIb/vnbxaV+H6xrLYB1ONzCOSQWAKh/DenWbMjEydwJYyEYK+RH

x5IQ9Wjfoc3Mi7RNaYoaeX7qLxTJkAGGVLVXGG6271WzlJ0pByFybb8W8i8EwaE2wbT+mWAoWQSyUp0v

X9q8wtIw75fLMyat4HQN7tW4DjJLcMA/nB/kZwJDPh
uaOa4psLatI8Si5Z3iPw23/FaQ5fNSfh34CeynJJh8bVwwUBD95GjSgRJtW3l5vZrdDcWQxHpiSY6dVM

Kdl7xyHrYykteDDc4Y+dUEKMUWUF8xcsp0+epZZm2pDNi8KZ1G6w/LpUXDPSF0lf2Yhsr3QYTVMQkqg0

gtw9taTqw7FTciNQx+CkT9aMEwJqFkn+JiJuYR0B5H
9zTeiI/TOqXaXGhdgdZyhCeIR94xnGecSUST0jQxAZFbA1ssi/RHAVHfxpj6/FXi++tLjG2eiFwmZwYF

2PICqwSCMmMsxnWfFGj0xFYV1HUU6KPSGg0hfj4ExbMZP3sHc/bzEkgejMNNFWI259Fo2lkyZz020WZX

nzEraaS8HZf/h6gszdGycJF38Z7DSMvF7qayGLHG23
95XvGxOOdWGDeJjrIi3x7Ayef0MoxaqVPuzsfLiCwQ5LlMI7tlNbrowZ+klonoqbh6yjqaOTqOScz+sa

88uuK1dI4RZ4yuse0cAeZjhP6ceS103wE1bkG6jcx6sbRutyJkqya5zc3o9QIh0pHZwaqx8Jysw/wuyA

b0DLxSc+hpoth97CgPR0CqiNr1XCavEq+8G86u2Bwj
1fI2QaBMXldPdMgRSsOkt8zV8ga2CeU2wKaj9ATXjSAZ7WvjSxamX3LergxAeJM5ajgF+2QDq+Skwz6Z

ixs5uA/8/xiTa5zISHdsGxHIUm2IVdwCznAA1GW5wcdWCpsUURqxP9BetPmujglwS5idTdqxLBDZlYaw

J3raMjl3NMV6/U3YnegYLAviYaYGJF8cczp4Jl9v+N
a9N4fx6tykm0yK+FFvhZZOZOUfq9wWucMocgDQmj1eSbYu9h6eFmrxF4mO2i+klgvia1oeq6JKhk9kAL

m8Xfk+XgpgaIEDOTcgD1eNzE2IWSET7w7Fx5Due7IpCfSOyao6avX/Au6G/JbYcnIuBYth48dOpJr23n

gkBd1IK5Ks5ocNRKhweJ/gAQrlPebtA9Gyqb5+pQNc
yTMZhk0xriTYGeDVMCE4ds76Mlx+Ip+Mkulfpp/eD3k4KaBTkIXeCdficqNozbhEw7ngsPTnp7Kgksin

nAhzKsdnd+TMgVOpnYkYCoS6frsRKK8jEXTiT0Fm47vHabTvGDmp5gYePZWFHZaK4qSx9P3d29BMsxPw

4lUtogPNNRdgZDLuaHqGHBMPEuW00HfnebRLcl+b0m
AXdv/IQO5bgrjmZtKBC+2xiEioUtsTwcLcBkRFbQqdFaTY4ZUFNZmqnbCWOG/6oU5KzwhJTFl6+t2bN7

abWqiXl14CHhiU0pLDPGdu/I3Rq+e+2XeWnohpq0CA+G//5037XmOnNHqJyjSgu13IkGawhf652M/fjR

32HPgwPSlj24mYDgEY4wPYQln8dQlSkhqR30ZId/Oh
X1n1DRFYXKwl/wpjMqQs+Tz47O+Awf938y9sjpxy5oG594g6IExb8OopAg8tQ0pwWqbeXYs27S/4xZI1

0XvEVOIPqNM1mRGhPt2t0xrWK+Jkv/wpUKEq2jCmDDIT/w646jaH74blvg011VSKq1zmCsqTYxTlrGe7

pu5NDL09Xch86eWMGrtXK66Xyrym0Q8tZWWMdjAJkp
sbQH2TzGrE5P52OmPJDlvZr639Djq44avfveii27bwJHZgsZnjWcRRR6U8fVy3gmqeDerfwtM3OnjcX4

YjaKAweM3v++PNSmel5sh+6Ts9HtNohoByZbQK2E69dl7L6JUihKuudNkCRp1RyKNZ9w5uLIxpgweCLb

PUEHy3mAPgYwwZe36fjpCQ+GknNHX6efejgTiLO5DL
szc7qJuizmRf9+tAg6imC2Uex19zxZlvN8r2k8OX3C82SscZq8TwP8yNAAegDttj9zvuCUkwUc6+zmDJ

69mFI/bPQn1KS/22DWRVd/BLGQG7VUlivgd3/bO4rvjhuNBrpqtTp0jEn3Zc0w2XCeO5VWRfBJvKjeUm

+7p+e1mkY/150ebeezoAbdJMHmATruOiz7mSUH+wrn
syafswv/RBkUfughCD53zKAEZoPxmx191vif/BxSN/yN/9jK7IdF5Kz9lLOYcdgdU46x54fHV4i+xADW

JeA5uJBJuv2Dz3BUpCoT6ce0I/oxpoYTM3g0B8GTj/H0EhiIDoqGI2YqCEKvO14uqXhhHTTb0JO3WMO6

w78dUjrRZPuYBJ79pYPThFGvUIv76kYJMpMQgSl0Db
/AF6ZJTALBaGNhqTXiKM8GpwEwSI0EOUF8XFABQG2d63Uiq7MqJi9i0Y03xY2sH46fqwZJbAiP4I5brw

3lvdXV1Qjby2+iMzx2vn2+r08co/fa+uddNCWo3etNsasJp4IiokkhZ3bim1ZDTl7nw3dzxWL5LuzOOA

WoRcm2SjpddoG1w0xtC5fUYw5t0lpoyYYsLxETE039
dMnPK/+dsdwnAkGQndhoBMEXfTz2EH1SmOR6LCQjcL6tmN+I9e+w3S/QBVWH8GZU/PSN5JS5vOdTWDzV

yyC2O90gwfuZcztWUPri9iM7gJicvcpFCOaclrohtdF1sW++1+nMwsWso13hkUk7scO8Xc2IxyB8nMUw

gokvRrJT97M4UYGhhkRmVTYCeqvxmeFUov2wBWEOLz
t4xei6ijTkbF8gaLXfDgABuvK1E4g9sf9oSUNDjBoW4A0b8kz+UwY8CPwPmo5cd435OQN0t+O/dkxqNE

lIKP0LLq0hHwEBDivUR+tPgnk0BlnlvukHufNj8rdae0slSANA8nq5BO9VCVH9eaU1o+XwB53tmyXKUX

f4JUhTfKOU1aRapqBqWEkK3zeRKtgfvikrztRSnf59
rwyGl4N6mf4Qznb4N4uWM3WS5dDQ2NaDDjHQcrUVwCMuP+hFK28x1awPJtPS8gK0nG4TKCbr2zXOxlp8

UV+DvAkXJTjCrvePL7o7FGp/YChm85uoGbHU3oxU/M6jUQUo4EPD6m4w/dHZXMl5BvYr5uAFSysOLo51

c1CsB9lZOvvRtRppC6t0FBNEIV6cCFZK4rQGQ16mqj
HLESfsgqBh6mLGRmQhGmW171SwbKjCLJYODtSiY0B20fBqWJEoHpPD+HTM4fsaogm7shHxwRpyfZsVQD

VcY7STfEXSeqnDtWocbH4V8fLCyYDyLOeoVIFDjVH+2gLRjMIknGSYiY1gB6bnlDTmP4Tw60y9kzbWOZ

MKoqW4O9Py2qpQx00CbpiNHJ4Mzeg9j1MWlKq86v6O
QCNU1/dV+6/d3sN2ykdoJ21PaDXm3MHTu/gQc3j+Tsj9UR/JsYFVZ7ADBAkMG7zPT1LqBM0wKB0aFtKF

bTfokIquQ+16HFs0CqI7QQmS+RlmIMlAwuCL86s8vd/GBdPwseuw+hmb1XnU36wgH6km0t1gwpIS/O5I

6xAwlbOum00eyPTbQX2J4ikK1f5gofYbttO5+P9WNB
B2RSZp74EpL6Dw5vSezkfIQgn2G9jkzWPkLKuqnGtfZX1S+A9q7oXDim5pPU+d25y5pqk8RaEVIgGFmB

SwslA08DaCAANB5SAuSklQ+7/paO6a4+nLVXU685ZkDOlsOl4gn/SaNuefGDV2dnx5HmXjW+zUflYe0b

9mbhuZwqNirMFJCUcxRBHW+GJ0/1OTwRyOaXgjafrH
S7xyIx4wLfsPClzl7ZXpH8dWLVJSlaX/dNLRM0pqQV8Qgy7HqIv+vmRZAwknUg0i7iQ+x5pJDMiHwcFR

mW1zEVWDYY6AcQOLZCNoTKa8YeDTiJ6H33NmwHUcVcqFa5DSRhAB5ztb52JRzXpszdF+z+aUKKTsmqzR

TP7BJ3fdS7ydNPdMXMP+KIetcnUsXHwkUfStflnpD6
OCpJF6Rlwfi4MKFe2sNfZpCXsWXFnnmy8jYCn+9vN52yYRRMQ4kQombZwZtJuqaqVtMMGNI1J+V5EVnm

m8erWUq8l9n9ziHSDbscRgP6oQ3uPHtEyX8Ob7NIGf9l08mivLEm8QG20q5LIdjLaQunFbQkQVdTIMEc

rlKGFqcn00sPm7ae9sBXmg6/BhgchawiBgjr95zhPo
gSxyBEiSrUX5xQalEaBPqdnWa8GWSVHNgBH5EZmaGVneZh87vney4c06fcBw++sTULo1V3dshafAtNOK

fkLLrriyyS3icU2EzZytrHochulnnULO/mZbWw+8DXwgIhv4dWGUrQQIvjwjknwx6hxPYWWbC1hAr553

8c2nq7uqJ0EliWwYmJKBTU5cNvzxqW92Fg8m+Htmuu
xbM/gn2XrIgsAzlsMYjp66GKMGwYc/4ALoACxEWuhX2yJFxaiWVz3evV8T8TjCE+x+x0z7rhbXg/9NR2

O63ZuakljFz81jQTXej4xO6xqQJ3rDffE9yaSwgeM8ZI3t3LQelhbRKsryI7oOFNBIMnsOGQt9yRQT4g

M5M8lHpR7mo78Q3kZA7fxiGFFqAUWDHkFgLc56d9Pq
RP6Fu6ECUgbuq7/6a4uMN697Utihd89qQvkFvgefvHm07y/IK3IYIVlbL6t+sjP+k78i/MRAW/b04yUX

j+tpK5nu2MTBsk5/YFTQ/7qQ+7TBOXsiv1lpckd/s+Le+1+O1y6975QtJiIR++yhJuw9pYMODiLAHyMU

L/d8JbJQyAJ4/rvgqOV1sCT5BYS10gU/flEK+9v7mC
z4cZKv0ZHZNguOdyjHjzAxrEgVfcYMrSgFQ0Y3TCWHHf7eUvcW1MO5OY8whLFtVdhEbttRzU2Sksdzuh

9CDZelknEdMKA633vs5rmJ4eN6bZfFbxQ1PV9NBdl3di+6jb0bFYwJUNXg9tBD+15sfsnZYCljFX6RSr

IaN8UYw1vYQ9t5FLfias6P8G6b2kUz3WHZbDg8wSLH
a7oiRfKEqBswLbAVHqvd72/90VXvwso9nLLPFinXwJwTJEvhV2Py+6phUUpB1bM52p5pVEZyRgoXP53Z

T2jwiRRiwQDpVBIb2Q3l2YTi2arSFQmNGDgypD1KLra0+v3wcgOD+S+HP0cL5s7WXq9/tZFv3q1kKxTa

NO5QD5Nvs484Gq5DOB50ahoOw5Rmn7celRleGHZLs1
k6no5bvqmCTW0PMOW2OQgtrunbTRfReV5BI+R7Hx394WunyzC36wj4o80dKt/gw5Dypybr9wbxwss4GT

AS9rg75oetfyj+jqOEoRMx5j2+d2csm8tEMRSEcLX2ll4Z4Yuujfi4B6kH9dLUj38VhL5B/CvaMQRl1y

4EbU034F9eGCXl+fg45aJY7FZcXoi7+gd7HfRJCBzf
T55z/vf10qJk0OauIi3PYI9OhEVlK5Id6vQS3bNzYiRdkpwqnZb/9aMZsCfSgcOxKRIimeo+fsZKk/tq

DO+6sa/zm5MxB60/NTARPTHl06KJxvxlWGBJVWofjNKfY1EaD2K0fwAS+CktkbDvUP+++MA1z/TBCSWf

Vm3ttyrZbtouLFe74/67PJqORngVMHQBEfEtc7q5/g
n155nceB994mx8Nnvw8o13pMXQsDU8y/xorp50UA+6KxaH3D1dzvZB2Q7pV6D/now8KozCo/WCf/eDzB

ox/lGhwfhl6ExDz7zzj3KC3F9gBXmbwOWGHO9C/78RclQfQua38F3Y5za8Sl+BviCq5YArD/K6O++IuD

9q/C62SwFT2XoL2+V6wbAuEwVh83bIwn/M5kf0zBhc
IGeKzD9NgjXEHZdvlCU8OsSjupiEXbkHMWjg0RPshfIn6iK7Lrk83YchZrbXr9oy9yioFTIp1PM34U7i

CD7jqdepCoLRNO5XIM/05nAPJIvLUp17mIyfS9SdQs7eVmBnLThUrMIytaELYvRLlDHXhThct7NKzkOa

ldIZXUuGL+3shccmfud24i2rqgP7bXxaGI9JpWzi7J
5TKbe6hiABqbKxNCdSxXiJ4tswD8B9vnxkh/N+txI1kXQcddgLUBCMIcVCuhpCtG0Fs8HTNqKiswyAiA

/CV30gubUiLV6AHwU8f3BfAeRj+iGjkaGFJgfJqP8emf2TcNt5pylYcD7r4MwYZy4wERjc7QqN7m2LrX

4lUCUQqZFYBWxxBO8vM6rEndd/Wcn//RnNDN5i+cxv
CCdJv1V8SHfT2Tw71omf+/t+7AKa5feO9HGPzmMaDzrXBLcolrsik8uys173uIbyCLhA/fSI+5jXetCv

7pbYWWCsTsdadx3a8vDIGPU63VLFZU5w/atr8YvlygKNvFmmtad4IlCDPUXwh2srAZlFtFdOO2RwtXyZ

n/jwsf5iPlc8niBCrDhcz94wYSSfXoRIjuEROCSYY7
WPsNeqoskuXYr0EpsMOfOS0Ip6KTLAhpBjMtlf/X7H7DlkyEO5M4zusrpYLJzoyPLm13Q1zAnZdNZmiw

r0ERu5oBbFB0ypS+PP/vyAMjZnVeKQlMDFYc/bfRj005lXcCoL1B/WpjzQrXgbFTEKLzsm4cBHnfPRU3

MSNuwZLpMM4BdcGqVlRepMcKJkUyOSwxqRVUyEH61t
nYFU16xn++k3FKWmKGr9hFD/zf+UEr4tKxO6Oe6fQE6VLHRminX9nGnB4QeO5MTzBtrxJS836GYz+WCA

ijHUtsa70r8mCDVNdSwFfdk/sb7ZozatJmFZ+bx5NjkodktESU+hxtE1iI4Wxx26BHuaoWU4W6neEfcK

EOmd6zSj+oLqGqrKxR0AkKJtB6y4J8HTWCvRSTK9UZ
Vf6/Ocxp69Pj0OSXnkT8bnWAqf1gCD/y9IvP0Pcdqb12FGeTgBLSlH+4nXCXPCgkSjeFGNWU/nbAxs2Z

+2wXwy88AXkV5jaHxIOuD6yzXUTCdWtxxgru/5Sl5+tY97b28gqyzKFTrgM6vvFzb52yuYeoch9549ls

UtfWUDZc6Z2r9qS7I6Td7Addol/FW+HrWC2VCig/Pj
HHSdTZjQq1467dcUq0nEM0LwhCEDqalfSqpbAxEn6S8keRFTd4/7r2ClYHtPC+igi5XzcnlC6NtK4iT6

Vc+7/VaswqXkVoT52Hf81gm+t9B8hF0ZEaKdCdqqv8rRCF+jGGFREPgyNSyG/7vN/785DIeYht93CQmO

c7dNkw9NqfgVli9HCgRRRZpOD4vFKar0SpwSJ37MVi
QbwII5Mkg6dF769NnkIDYTiEEWkIGcwNT0wiSi4QQXP5JHkWxzvs1sqtamZ0fVTQWLfInQndHShOTBpR

fEjbE/fZmtkNpXhGhpwXVugabxBudkiIqrbPF1lweKLuXXLNwDP+Lmry6dPzkkVd7AiINdBpa9bVYSyu

bduiCHjoyqstOXrpmuPlAwpJKJFK1ecWcbuCrOY8i+
SDyQmTkWCs9qesZ6p0sNcPr5MSBdGYaCtvK/21w1x2jeqLkOHI0KjS5+HH/le1AIsy288xFmohvqCp0V

YINhIZcxfYlLRx5cEN8Viuf49p0p8JdAOy45XsaGFsZk8VaKuqQur5Y7t5uh4BzMpUORAJJUDXCsio2h

d/OWb8s6DNYEXPpYOo8zumKa8NaKsXGcVMOKWGUidY
lagd+i2AdxJf6VeXj2GaJm/XnyRPuPQLFFOA5HaMU4QJfsuo7r1NElE6ETSb6tFsbgSfH5fcTByrDWye

wlP6QwRBcc3k01fZZxzFA1uy+IgWEHkSstJ1O7/znvLkX3arqiy/9jXFCeFSHQTucO4LLkT2aZBRQQqA

eFR1sqC/1By07C8SUelNAnsxT6X8jpwXenYhJ4kh3P
az+kRh/PKAtG9LvR9gnYy/gxwzLep/z4n74eZu+4aSG1uCyPuyKfWBoaTcwSuEVO8TdVA6wnnIbivHBc

WDtEO7WaSPFg8tBgrg0gXAc/Dyg4qWbVYTHnOrw71xL3Q2jn9+I45wrKq1gYiz4HudjSQHcYqoacaCTz

xspYY/ZkrmFHvavmSb+9tCM098bWMHyYWvmhnE0uoI
RHBLNUPNpL+q21DmI4uBUQNfvpy68m08ZQtiecVuCivDmuPkqpX70oydv7bU2xBijSAeuXxteksy7jQ6

nH3AGAaUWvhaXg4u+GdYdlejMeSpFP3HSNZ5fqRJ2Wvc3D1j3ACJfO0r1x2SUJISaPRLty1ox8nqCyZK

D3DJ74rCw5YTczdKyLuJYNYIMotfpTLozxWFRmkk2k
vXl3UXkGmyWV6eNNR1ri9MSmIGBVR/Oq+NQ+nLM2/WF7dLf52qrIKTr8w/mWf3xOWZOVFdgg8gjCb9Mf

4j541U1vEOEvxN6gGtYn1iJ4CiErsoOebglSv+0z+566tIdaZevLA2QJR92fOfi33e6lqCp6I3UjOBYw

ke6cw9211gEMpy6s6xSyK8PoiK/9+ynrDv4EKzGb2k
SGNglCqLnx11a/1ybMCygfSi8Ofk6gFewsZNGttK0gKMSJ2AyKD09NSTJkwgYNMtpT3Go3WUPvksyOI+

Lz1X7j+TuS3NaXVcUHqptkKA023pATe7lNU3NA9HEZSyx/AGFQNQG1BhM63kcEdmIEuTs022vHmMVUOt

97YzrsEmCVzdikoLyPoZcA3MlgJ2HLH2Ow5wf/HllE
2qJZI0+WT4CwR4oH+IptP+3s/QI5y1amt/R6Z3xgtBmgXVlF0E00SG4vE9RAWZD2mxI3MtdBv2KgN4RO

H0AVkrvfd1Jr7Cx7RljFU1Vvsr4TC8cvT6YQkSlAP3J5Qvl2uduRzGiny8gD+3s20C/JHbifT7YyiMan

YqNicHHz1gorw/lrmcvmG2jp88O1l5XAYNWjT2v8Pe
hAELzEekNaB6H9W2COJAXP8VDziZNLBqDTr0TM5wzLmUy4oi4lNK0gKk1p6Rj6hCq6hz6aW69HQWAxHj

fbKf54qiNL/XYxy9DniE+/53wDiMbiz1dUI19xzesK1zI8S2aYmsH2D1nOiLC1K/ejkpF3/YqBM7J6AP

EqrII5p83bKSWUUXBn5qY5ThXYM3oovmhGa4bU5W2j
zkxnvr7gYZ569Hb5Y5Y2bq2HPeGGzon9TwKARGRYY5JKss2ts6CpQy5/GwdeorCxaT7ZnPDJ7PEhzWgj

tElHtNaeCYBj/DtWtJ+vzuewTd22m2lS1T8hC7mRFqFC+CrisWvye00xX+D49bbzL0bnIBwNlg9eT0gy

qf4QnyNReTNgHtZrvW693+GnOUmPP7o5i3zm1qhzjw
JUwmty9CG6hp6ET/ssYdgMfuPc9jvrBqeuBS1DPnXJy8S1TPfAK3ckh7zNZXp0bbC/ksfyYtPB1chrNz

VAzEQtOYBfHPy0CMpqbX5ctlzCri7XK7MbyIddSwqZCU2CxwSKY8KFF/6JVDD9tFjohI6FBhHUkNWb7W

E68mZ8gcdl/MlTL7G2WSCa05A1tLdENl9fFyIiR8js
kMh6mBdwkrgABfgvB64mCqwv9EYKgX2TiUXYglPuFE2AQzBjUOy5UAa1pFj/hJZtDbr+FNP6tPSUQCG2

kwm75sQpscxP83MJC5frDTmPUNLG8Y8kEiGqsczRdkH6oaH7L9kJvPCYi4oDcRYi7/WxU0UGMjUZcBbL

iokOHSg9exENC7SZzmJryvfECL0NW81hgKlO1Ixi8n
Mn+xbcOQrTBViKIJF+nGYD8VXRKc/Bgru1eQ+ZHu8BCBSsnN+iUe+SNYY5ysNqIAMpSLHNW0W32/si4i

NoxScJBetjptS/xyGhstcNAdCW10ZeMxOMEce5iopwba1htmc4H27+3XCMi7Akj0eGRJMl3bhbxsvXak

JvWIHoGxvOsHXOtHggseYq1x/KyezTx0MgH2I+tSGL
V8TMyT9m7NH33jxa1xl1oql5ed/HM0rKzOJ322CpmIqYRAHTl1CxJZDTYz4U0evgXHpORdQcic26jO0K

sce4+4Q9m1QAPDmxcma3BhekyAVh9+BD12d4dVFshI1YIkanev0L1XgRFPelAL0CHGxBp7/KtMq/v0Gj

tdBTLNpoEtHt6mzwEowXnLC0b9g1PDMtu1UNr3Loa/
3TMZ+6yKkYW2RpkrRaMoVaJyWdDP2QZWMuw8ycAjAjCth2xiA5hnOnQNlUWOYtQnlyOg1VPvmK2RsGbZ

xxHyAPii2TX1RjO5d+QQ1ZXncL9KWCOQJa065T/n9q13AkvUfsM2hVH0cAAETX9M0UnitAV9bcWxpwXm

DEhgl+HxP3Fxmfzicjzd0LrZRFe3+pM+d6XzSnKGso
cOsjNfUdzTl2Hcje8wC0/a50f08bbp1rHHcS2dtbc7HM9UKQclD0FTk+hd7OCmFsfakpHhRqaBkKpkGG

1ZkpRG96SlxF0jaEbJy8+jLYtstmX7W1ajC0A1vTT6FPbKFA3ix2pyQCm5Nkq6tLGw7sIbdLinyJ1XeA

3ldqeUwHJTOrVmBsmczqzuZgst2Gk1KV9dhpMHrwjq
YCV6yakK6T31KNUVG9hLgTaHaLc3S31OxR9z6pRWDmcwFDpCw3fpOmdUfnq+Ohhptx9rSfta421IP6ve

kxgmZ/MyUpsdtjAm+YIIwBNIWaFUgGm9wik29fa+cc+WWg7XnRWshmCsfDiJqsbpXawYEXT9VDqYJktR

ajr5eyeduZkmy3Wr0n4eQaZa0Esj3igQ/yBkD/I6Op
4bc6Dtchb5vhR58BrvG8DrEfzwLN07AyX3lZfqicjcFboW4q/LSzU25Cgc46r/ODAT76ZdjQ4uoJCsh0

5WSsGrLI1BuuYhhmyQDNZ2bZoHoL6vQPEkzSTOtSYIwAT04eqQmUCv+AvzoG18TPrKaiiJQK5QHFLvbz

+1BPwLz5jlj44NLrfjdjyTdMr70qH0xekgwTuYPNcV
abhishek/ytpA8u9TPHLhSSYxXl89fktrCogSviTU0KvTVRB1KU8KFO/WLXlFEnPmcH0iowtvN2mEB8fJdhrLl

ZritAFYZl+Kk4NLiR8cnl4sBnIgnOVcf9AAjvoqiKRyE0MDE6/2vxldOH2GBX85qU9izBct3/nXR7ars

hJiKDsb/FEIn08XN2MdkPODzpfD5XlpBRdEQGM5wYU
kEj51ne4Y/jzDMoDjvMzcy9VxT4YAcoBfJmRxw1QArdrIWHXqD++xa5EZTjL1sHELdUe39C4/yEtIZg9

WG/+lVkH6Qm9ThHSI23Mxg7cV/0R0Ld/VMbXYBzFZvrRjEN8zjtzXMapMDAxbVucs6PTU9TQJlWacLW1

LZSrUIFvSvpyBANtLeYbzJbUGjqnS+2y16uEglAAnp
E2GzXa6ZtO6Qi31HkyoPzpMvY1xoF+hMaI1Ud/JiAKqu9fThYvuhjTnrClcMZzlwvILAXfShI5o6in4A

cacttOjnMii+cZZXvgQLnGzs9QO9R1nWmv5BqgprwRyTS/tdQC1AgOUwhIb/cP3w1rGVytB/TsM/fT+r

8kFoeTD44Ypt50FfK83pyuDyb7zBBtsEv+t7I9RZT3
+Ne0zGkt+XtNgF7FmAK3fzC55XhKwO00fy6MuF9jDKWoI1ai5mwpdDSjHwgLAj4pmrEG1qxrZPrSFSba

iJCR7umm0KwnuNtkz13YGF6wKdf/4sM1ljY7JZgaFBXgYEpOauR6Y+Q6n73VJ/JYnnVUgiX4qt1TRSpR

YWEZ8Z1qcpYCb0XrG+tVfhTpZFjcWz8HPNoixtZ4/2
/HAtj/jsgVzTmcsbM5XppF4fZXEIk+xtnUYQeK+dK6bFrBDl+SnRqxvxc8PEqrmQoaEy8Wmh+dSaVJko

808KPfJtvQulPLJB+ceICmkHTWLWJdxj4QuwHYq1gkQZYMw7rp5vNMExjsOJbyBiXy+B5k3L757gQjDF

JuNkWJUqf9mQpT/HtXRZAiBJD/Ss1FDoCea8S+14dm
1IGeAP2hx3zfmYfClbmIg6DNmviSkcbKnLK+m9r0h4dDqlp7TAzenldNJsb52Ho5wHGoe2Tx5Cs//uGw

PCCFaUZFSXIxYKqRTl3cvtV8sIjw0kuRiyGvwM6+0tnE1q4SBKXlhpkisG0IaZDJfsT4tVHEZmZtKZc/

kibf9JC/9HcDqNZaGCw2a7zlvd9Keub597ycFq5j21
j2nFKp7saR2sDKZY2ie3VRSiPutfhf8bQPOzzHTAemaOl2i1+T3aJk61Xoe3yEuo9vOlTmHbS7tEEvB8

RRc7kZHfCxMNZlXXedxRAg27x/Z4lRTXcFJt02DRmhh5u8zhCA8qdPrCjKUvsMI2cJpI0iUcbvQ/rp35

rrrtt+98KvvDSWGwbGglhg6CLHkLivcvYtC/6bb0fu
xvsLASFFB6p2EwTXepWQkHcDW63k6iIjobBW+gnpUN8YI7027hVbNVYrmX/uhDcEfvPcugQyuZGncGc+

/Ul+iEYfBacff84hG/icpuT2o7A69VKIQwAOf62IALgwsxA2EpKngU6eGfQeknWrMXObiF8U5ZMsrFmA

gAzhe/PltqGaV20NX2OcELvE2CU4MeiBSQEw0lcJSX
WWVJ21W2GCJVPFzcMYbaESSxQ26paOzSA775D9XSW8QbCqofokwODLcVul6litNeI1w9cqbgDGu1ZwIp

LjiyprfHgGl0Wha6671giDtzvkFre0O0g1dgAjhsBN/mRnGgwJhSH4GC4mn44a9jclISzw0373EgUwWU

mOfzwCmhEZFAFfD/NYthv/VOM3SzPNDUOuBUX+wP/I
LE6Hym2Pn3p8gpJz20bCNcSEeWrNyvKrgz6rz3BiL2mAkWWUcYG7QQDAXvScQT0MH+/yrxUGdfLtoo+K

mmJq4cypVFYq/nSKC1kRe3LbHyyb/iC/fTlylwrj7jjzl5fJs2ONDXpKP51ccBQySE5rxjn4lCQ2YYQ1

vMr/4IXWk/KTpED4gROl3QzZ1G2Wl7vCc7okOCZKAm
siYS1Ev2JdlTRIRXsS6hYNO2dnN1JEmnnR5XHvy5zb36TK0jbqUbRSXxdpesr3WpFgOQA2I4CEvKJqBH

s/5sQlPAIw0rV7ITP9ch+elAXxXEaj62H3ZhOeQBk8eSugRqCe56ybt6L3ZC26Da40A/dgPha564GZaI

VJoEGvJhezUftX3tRlUD3ZzIBgxBNqlk6Fqpt2ejYp
u7KJY1T8ncFz9TH4KD/5CDSeBaSyMeGDQYdblmE5ljivFjb5WZkGVSX7Fh4ozgKZ3Nx2TB4VGQxQh1tW

QA3CT8RXef6nIdT9sa4Ga4wB84TAheebQJpn7Xb2cg7vojQZwbttWufGMochbCaUzDmuTawQ0A/0kW6G

IP+tELE1pnf7JOlKToNHirH2rK4lPvAoxWWojCPJnj
+c6Np71y+WcIlHdZ2OlWQ8C7Ysg7Lzd19BLPR6YiFDofZdEbEye52/qP57U60Ta7Ek8ExD6bt4PWQir+

rn6APKC+N2Aw2FdtU9xtQvWHz+WYP1BYnDc4w8xmbj6on+AP8QlNP6mJsiQlYmMJZaksRbrE9nRg0Bdd

I+1GQJJsJhvT4zQ31c/3dnKro2+m4gEHAQ6DaXreRq
jGWgSYkZr+sSooSjn8fgXdd1nUD3xkuyf6q6X6BF0X1VMefI6qLPyMWG7m4nQzR1YW/g3IbxHAdzk4vy

2eh3jAxZEhZHYm8zVVqN6Tb6YZobDp3EcF2BjHrKWmiidOQrYk80Be43FFvskwiXEy9tMKVCSJ4Phc1F

Bw87kbji2c7rQR2r/iyaSyS+Kj9pnOAeeKI5SWVja+
27p3hYhHNtoAEguh1jTr7qpMW4Gqs4O6a9wswAbsW8+eJUaCoSuz7v6ELKR55TG03+rKBYxOJ/uQCnBw

EMQ3U7VjFj91eDlnJI3zCDjpkyOk35AZif5KrISL+wY2wTFIwxQdX/tnI4qJJJyi2ndYFCnB9U0kT2n5

nSvxu5a6lGc0DuLrAh5K9sCJHkyQxpABKw6dxFKgcC
MFRGh5vvqX42jNc0dP8sVB21GuMC96gHdX6oW3cFqwymZCoROp5rubROJcRlXS3SYozMbgq3W9rNRWdb

wRIcd/UDc5wGS+u4cXmzzLX3UT/RKl+2KX3yWV05Qr6uUBXFJ0IxrNGwJjqhnmkl1hGnQ1OlaTi1IqQ9

9oSj+l2u7h3C2md7zfKtJONRXi1Zdce3Rxam4sQit+
A9JfFH1oWDF4UID8BiVGpocqM3ZIqgrp0daYFQ9/ysay9a6tr9aj/Grjw146729vhsfNUf76rFOYVbrA

igOy2TYAa8tubkOhBzMV4royqfPM+cXUrC3dxH2w3omk5YHM33aUg3eUjo85/K5ETqt3vCjuvrXlu6PS

6XDngAy9xnCsmtoL457E7/QPJtHpVsa+x+KPI4NpxU
pj9wdc9j2e4h2XKl85x2jKGryrelpHe9tBI9lyw5yNQE9OXO+LFZIonTHR+e4YHAxyyjQGQgqR+2afZ9

D44lG7ni2bpctVIOWvP349yLrI4xzIm7kxTccgloBoAnz4nMNVDWoUpx20upPshzpum1Tz3AEz6RId6A

kfFHTk8Cd70IyBUPfuKLSv4cHP7miRIL5zL6Ld7SUR
/GQB0a9aT+mFHlHVF2ZoDGziuFT9kVDAUTUGnuiBbVPsnWxnacm2pfjU8Z0iNuYUf+FNcqMVBtbACbB7

dwG2yzCcxrND5UuIfea1ZZ7a9s2ocPS9zFNjizxyB3Kv4xF3nWUv9ithuiJPcgma9LaKA1+FcTfN2Pwz

Xp8zt1trXczodP7HYMm6vP/GKsN5KgnskAHiEJ/pCp
lYrOukUMTqQVPqldthxUPFbYATxly7ngisHYliuh/0TOPiEHfA7GeM8TtBDvmMFeLtJdmg3QiG0jlcfC

cdLi3YTQpwfQd+SCSL0xYHhx9Su9HV5UIwizJW3iA7iTE/dpD5q5mw9ex7LlGVESpdWOs8C67iv//rBg

Zv+FL0O+dEAG3aAFpuHhNe1AONoKhul/Zo8vrAosGf
pFukYCfSgEjoA0DpoxE1slJJ4fz4Fuin87Qm7aCwRzw8qY5+zexBJl2pur4zajwjDIftBC/aZhkpOjhe

lQKubRkB1JN1UH41nYKWMN2SzFizCMxNcrjm1xj0JCMXS1Ux1/WmDjXsdVSthsZJ+u/ltYUgfA5hp+Iv

dnxbTpSYACIwu9Z5FyQV6ohJ3R98DrPUwPcnCpXzw7
/d+PQN8ZLY02cjkvDVQrE250nFY1iPu7xSVI2ZDzI6O4RjMSQVgoAfRZ0rgN8qxjyGPbaIGNi960blVM

g68HilG72e7i3oiv2axkOo5tzaClkHh34s1QlYUW09Iqh5l8kSd+azIO/HO9/jZCoV9uM6eeeRfN4kv9

RGXx8zSi8GM+CiejPumIkxEeP/CsJPLh7HLZ08lj+Q
iRZxhaWMeAPx6zEcJ68Ie23CmHRzsO4PPQfIOBN/gqU5gMjBmg+yFy9AfFto3fM+q6swgtc9uNtdt6Gu

Kw+dgE/32SwraYRDTEy+xJl5vgYg8JkyzXG7XhSjn3LRo7iUeQYSDW9g/plM9sdtnqNwEm+0KWxVKfdc

fC/TOTCfMqj9pIS0OtGOhZCf+YtmxGgWp40eg0xG95
a7zTRItvrzVz4jO+n970z4CVWQPiglH2vRTJRTvtRxOL+eI106wAg+4YvZuPxz47q9vhHuOqMtg2EYrA

OLmwoFgwDoyxyPZbN0XUmycUjiqy2j1Sl7KkGOmFiTLG742ro+fFG/Dqmy87dGTgbcfjvgU4dFxwwJIK

3DN/FnLCrpNNZBbS5fR8XZbC67hlPm5F5lN2P3WOQY
4gvRYfg1C3rhZi7+/mdFuxBJNTfBP+4zf+4R0cz33aD9+9jmpitIttiaqPiZTO0ITn6t2am+9uNjnsl6

aREtYdy2/VhoFEvOKCklBpvuMSXCmqMiSe4OFdTiKZyhx+myp1+U3t0uBZ9d+jhk37fdfNd+PKsI4un5

d+3vhfUDzIAadv0zT4J9fbgxTz/H3VAUSapFZSKW5z
p2glJDzdRJaas7IoTwsnzYCuDc6KH5SqrohQwByB5bgSNzpDwJQuZjkhx0fHovB8lHhcjQKPf0o4/67G

U3t0pwVG4gt2o9fV5xhjM0Tzg/OIuqBOLzeH6phbzAAm+ylXz/FWbFOmWlKDQObpxldR9QhYE348v3Nz

FUPcR4QIYBXFso0ZvdQs3CmBSDmAegirFKs7XKIsOC
gf5RXQWzf6/2qjgy+q68Aiea/QjZ/D7AL6A0nL1C1NHxfUi/w8KR9FkgJD3Z0DQsL3+c4JYVbqjmIGKp

QfWsoZhpNukgrV6VfWewNptjdiac7NlYR9XfDHF8aVXO8pgzGFymyOP4/Z3u8+Fsd0WLE1YBXD2AM3f7

z/I0H45Pn2TX+Mckenzei/6ZX/DI84cnheCP7thfOdaY6oab
/EnuZsSsA0E3QXIRz3gZFE49ZuPyijkGDFM2HgEs+M+sWjgbW20G/F+lgROygN9Uhz/VuYiwn1FCm7rq

D8QJpcPF0gMgHw4d/fYNRhL34+xavX/sQGYnMSpOemiQs95xRqKOoHL7qP5/PEERbmO0v9BFvit2oL1L

0+lK/PuOYf2gVyd75OGPvLQ3Y55WFF/CosoMEyeF90
FhyblJncu+xBtadxD5i/wrK7dEsSi6HqZOoak/dy93HzGb1QTw9XLRW5VKB7Y5g9WvqWq/X0Zk0FhuR9

goCK7uCTbb0oNr+MYZvd5YgveByENIXze6xyVlPwxpYBBNhjVaGq57y4z3c3UvFsXR8WeqwF5UBuGzch

1ZZMHqaKNo6B00JEl1t372mMcbwdEgTp+qL8FYYJ0t
9xsj9X/Phil/eUga5TwjH2sstGK5KYeo0rdCwz/Olt6ZEs8ylX7W8VyZ08RKhC6Eox/oXAsd9fLxfA57L

ChDrlFRHfN1hy7HkYJAgxs5d/Moore/rJTNA3oclo7U6rVuZ35RE4yoQrhTZ04eq8D3oegjP4F4s1Gb7FQt

iN0T8LRCvX7fpDMqooEIJe5tmmpTk4zBM7XbhnbjQS
+z3j4xQRfte10UtevPsZJV/OkTD06VugPIgLfbix9LADOnncw0PUqMOmDF41qGP8u0a7J35wwpVG0hRd

UeyAr2/mHK71gnlgO5AAct+47G5N/8tAU7J20rmQuUgT6K/TdT+a7S4KclLDcNBIxQ8u7qJWNtVH6oWY

oeo2uPCTj+PcbZ3JuC8xitqHF7tqcOTCeoEhcZa0Ww
2om1oOgoN0vBNmLijqXzT+HOO4CX9fZIdwA3vAwQ7am6X+Np58xPe7lvDbRPOeHmaw1ZojK2v3ree8nw

GYo8tA6IHhovmmgkQ8PqP1mlZW0Tb1TDJu/MivrW6ypWKROjZ+NQv+Zg85DzJX2ogQPHbjNurcVZPCTZ

LcuW9YAGhUKrshGEPsqB1qA+HMznanDrLjExWxDrM0
zWD4eoubmJ8K3hRBRbbfI5E2FFZGyvbJtjQLfAoAyEQaKUhfBIdRI2c/R2SI16eRbPgr6lI5YMiqpJFV

QCDt+7I3NQjF3cygIHzqMtQI6ZFt9GsG5gqCdwaT9b85mKrCDIybARi/VqnLq1qnWt3wqpqC/HGno0Se

DwZCO3LoNc7icAigBc61Scl7lIZMWspoYqWA51p/NT
NEXyUKmUi+wZdCh18ubtCJzpLVJHX2b2zSWyS1yIV6PF8e3U1W4kWtsP0YIVmTz6yeBOTQDytEVZ4TzR

TdLTwndheIHo0/Zz0beqG11dsDgTqZlSLgfzOzVjgiMJSWirR/IY49hsQfR+4CUNk2kHF7jRk1FgWIf7

fUesW/Q005jH2vkbq74sQfv/p1MHeOKrnIpzVYv2xL
CP1rOJlq5CPG1Gp0+UeE0ejA4NYJrrhRcnddE3pysxEZ3pAut8K6KaxaVqCrBMN+VB5eHMEVoqllbvsI

vYMhgvkOQIihT08OHGY1QOjdIFZMeZaO2Ig/KnqahJmg/m1DeGSplHmWsBC031+r8CuIkwnePFnE36zz

/++cYnOYutH10ajkJpKuwlj4ecbAr7Eu9LzfENygSG
yvApexPO1ZmH+TuVgIlg9H6b0+wktEbmtoQ1o1X/4VTxdnQlppXyKl2zoqMnE96I7dFzfjEf9U0Y6G/P

vONnq5cni0GV3UD+dXO/AbptgUkiQoXDr1DYzUKw/NoppP7TukHBj4BVY5I0dcyIM/Wo5c/3smE2VRMJ

1pQFWMwGDU3o9+fluiM2QqnXuQp7N0+0xfFepR/Cjc
fbUJMuYJ5dTJZfkkBh4E6Qiw2g8YQlbt2lFbmQS+8QItUbVE8k0l+BCdk32Gpm0wNFYT6/bLJXlPzVGI

EkgFIqj255V6Nh8PQTbxZHGg40gVISqJHxjNH4dhI49BS0YPOV0TXImroLVCSB/5D6kY0e7+4UmpXHwC

pKRfaAOcbVcZreteBYGG+KR573P+/PqGPiM/2sUcAm
MqG8UjStpoOWme/22UKzwIUhGtvHSNKfo2T5zg9es1odWbcnHGxDYucJbcGEGMGeuc1lyiUWBNuZyVPb

NYMaA3k/GbqcQ5OnmgGPmSncA7T9d7z9XIPsegmhzJcClgFRqy36u2+I/j9pUnJjWi9mQEjfo+zo85CP

Jql/dNT+EON3b+zXoE76fJHszbZaOl5vj5xJO1SZ9c
I/WUeKW8IqD0/skWHOpOjxNl2Nl4uFv6KnOnJlZgWTSmLs2zGQRHQlwCbydI1eh9NNDpltNLJLxyWOVB

CelzGQGZnP5JOj7FIW/v9xVFYbCXqpzWwyEHPqJogp9ojytAGySvVtkIwlKv67Z+deavEXjkUXBLO7pK

2jioRca0v/wiVfTwrJNTW9B8N+/fBD5NoalUprhbOK
RdnzqOXZJHsePamhzIaZ3N2JA6dWz+k2+QO5ZUQSwfgroK3kj1zgQjpYhH3Jh+Ps5Wcjl42mZ7rQb5kD

QEmgru6soYBaWM8TxuPQdMoCWjqMEx9fCvJpaZZaM0Hbrw1kJp+QldLwXIbAf51xn8Z5UISNDHeCDkG2

pGNnSX13+eO3QOGe5wUR7Tkya+EFSQpAgEXC0Bhy7S
YDlgOD6lRv8zhtTQ9lQpy8tgf/pyec7cCAoQXUkqSMERMOUnU3EIGJz1+INq5+SE55B61ZCljFqo/1AZ

p3M6g1hdQcW+d+UtMMJAooSx6q4jaVPFKuQD6dHT0A/JLeZ4K+4MuY4y8IT7uZMwXzlQl0vicyuevr47

Stdfud1UO8DFfrSONQcNPuUsKMFHbKxqtwFoQ2GKw3
oa/59hsRlCA2yp+TUvyRp6rxj8w2w2ktHfIRYQr8+ul763n3omURebbUA6HwoPFOUPYcIIZJjwPEjrDs

zNOaAbqzwfBFkxAkd0PcNEJbamzFJYCTR8xEE5iaHR4IsNSd+dK1nsyChs4JtTBZHkeccT1uui1ZpMC7

3bJT8h7U4rVlZUejk/kKwoPbqx7B+vgb/FB19IH1Fa
5wLtdl4fCNhIasggrBYGV29UlCvuKPZOeVG+MnIqppOJwQrv0UdgRsiqEvQd5Xd90n6I6dBeI/5710H0

I99LDd36zRiYnXlCuB76VEU1dugLQQCJkvYENnXQBBMCp51DYdkIoIa9rzG1xmd8wtMZomFtQdXI5k1I

cMLLn5169Ph9u+wPOgKoPCWFuKJQlfL21oVZDudJ7V
0STk0bAm7uWO4qfPlRc3dXQ+NZ2NIliK3mp6zojRiK2HUXoi8Ff2dY5hPy828ZB8Dkmsu3j6rXoQWNc9

bprADxGXRRFr2KjYN3E1S/Fi45hZQ6Nw7pX4C1kf2qT/iY2exNuPh0w50UT8RIK2nNrquJbj06CLzBnd

rZoeg95Wxbi0xHPGB3sRkOhqhbzpVN8Z3+5xDMhFb2
X0Ls2Dl5+8kZjGmEdkKazMXSRBK4wVpsMrJzCXYkjut6hYXBZ2SqkUMjbzSzgly5gbkRCTLCPJfT5zL4

VIFD/PbugmIO1i7//CzfhHaNwchtWY5IXmKw/hkQjgDIxvgy3Q0BCpUuyHc13mJgdQeW8HUAQBxX+zzq

qY7SVxpQCdGfWa61bDY9UAE6aT9+8B/06te5awy2Ak
U5zfTvYgjmOgeKSpFR6yhzW+w8DQlq9rCb59/GxdPhpFiLyqTixW+dgiUFCFsR+E31HCG+sdP94m6UAH

Rh0jIyRKYsPeYIKqqCUlZKAPnfXYy6x+x5+hOTWrr+zo9Lnt3HgpFtDyTdNfnMotRpdZFGXBA9Pqf8KZ

XwCpbaFb7nQwtx+d6SxSVW1tyxhGu+RY9eF41SCLe0
qgMs7WhzfE5DhVbuGGbV4WFlhzy3q+r4BOzCmIU0/dudRqYIWeyZTetI2GzlgVc85OgyovCrgPP8/Fcy

pSuObq2jkIFpTEdqTpbChfrPjXBrkTv+JiRYR2c28l06edJLu7SCxXvosstGHEAzGk+y3ktZBDbc+130

qByfLO4I60aY7K59kb8A/8UzYxxlFo+wYnfSlrLtuw
cf0gMmvjqvMk5ZjyqgUM+wMjXm+zIlPZooZSQ3y7K2mzdZsRcBhClaPDFr1vIqOmxf2C9gUg+DdsVfDI

tkZgx9DxlorLiI+OZkvO7YCZzYvbfT5BV7y5f+8Eu7i9f3Hcxt4nEwEUkq3HWDumjwsODlbI+CYqwWZW

02jLzA7sWx6JDTisrE+7Yqa+QpSOf4wIoyx5Lf1jk5
9zmudrSPRNpSe2qTsHMBW+wft0AThZ6LclEFUCfdDYr4nrp2c62xHQNVtfyfBfIVYHvLa6zvrSX80fRx

USMNyDoSrnyBS5Hj88WN4QRJ/BpwJdGP7NCU83oq8j9ZGdepJufle3eALD+WMANuApiP+katelyn+vF82+pV

FV/mgHw4U1on7IZ9AVgiYMbOEki7w17adEOicZjr+8
YVRosXcuTiezN+gYDYntcsLeiSFou8wfeTIJe8qX2LLbQBmlA2pI93qD2QfU/PNRx8qqwXHJM9BHljWN

JH2zHunEl2LZKSbShhBfSokknnRuonc/dHWZQ4n4f88AXi7aHUiGvmi1wa3vg8cSHbFJ37hy6AXyv6NX

fNc1kIs3ZoUT89nc/juig4BRDYg7NNb7niseCu/R2P
lAzwW5a39kWygIxMKYYJoMESV8xIfcsJlrBSDLGAqWSYJfn8E0xbs2tboBPTOe4Rx+KfKLsJF3sTTYQ7

89J4kj8DZh5tkVehwj3iSDrjhxhcd+sheiy6PUM0v6ohOmZj0kReCtFh+D/UgoAXSuD7as6bv0s33msM

7ClLghsyxIH4Mtt58EwmUqhNmMAMUuMX+nZyNZOx5M
JLT4Lzojhbo8DeaRQgiBWtASp1523DNfm7xBT/MYAGFD/StxjWxQEWedZlbXXRNXDm3BuB8Nbk3EhQIY

5a7R50GENcqwn15HiTcD5yhye5DOwjCR5A9MlVIlmLZyiwhjOcHffmL7QOmpk9bm0XbqjQYJ9kCNH50L

NfKISmOzEBhZ4L1L8M8eOGVmmLtd4htk0EC0ByiJ1B
Q8ls3QLZbKvJ4Q+mHUrnrEKq8AR9sYRlU59P8mMvwsH7ZByq9pHPd6sNflQbd3OWxUPPfuM1PkJA6LSB

oJZ53AUXepFnkOx6gcEheCD8p/fGH9FdQac/QrN4BAcb86EGLDuQstKaXSNbjO+m8oA9XLlDqoBbozsc

tUar2RO5ffpQWiWL64c5DyKeXHgOMmVrBRMXsQIJ0o
39yBpfONuBdAyH42l//Fa0KrIlrbY2cGQekuREfE3YANmfa4oBdZCvRO27yXqUHqH4yLZCZLzr5G/def

h3JD4wRj7afYuz3iGiIx6DyuE7ReiIugg2R+Hxfgv1llnfxleyDGjr9JemIvR45ytIUfduYqYeuzCZr1

Xtxmc6pkNQDM6pyRcy246hdzxQqOMGNfwqizgHZ5Cg
fJwidzwW2YR8iVJgOIkB3KcD6XhqbEvaa+3Xm1TaHmUvVJWVES+CciPxZ889ZtNKlWBNi/XcDqHhraNU

xsrLPyPJkxTz1U+x1jFVCyUNNcdWAxa4jWNB90smvkV+Fq0GPFI9bKF6ebcffBbcjj+GtieNb/9q//UW

5dSqTvSAUWXj0AeP2FgmURpEI18hwfuOZy9jbaB2wc
unW2jjYVJv1XavX41DYpVQz9JBFkoUCCSzsDV+E9Y/20/PwsMFUp2//JrPYdexdVCQRN0wt4ZfutIv3C

kpH4lI3+I6RAghCLQiqFH3yB5r1gZmavaqWbhjLxTs0HRZJXM+U1ke3qnzUiVX64Mod6zZr0VGLV+2e4

0eFOqU1iiLlXuRAZxGRkSTT/RFtWxZ/f+xdYJ3tzsD
/oB5hJSqglmP+VqVFYZUOgmxksnbluYMoK+TeV/FLcMqN7tPbUAhTTtvpQNUz5FjFNwZ7XBoVKwU19BH

pHm8UfU9B07CChnyALxvY2CHOywdqzCwiyUu20B2c8RaOwYQvc0HWH12B82gxkwHxTal5nRXyZ5jAmw0

kcrQ+VN49UZHy+dQBa3GW9rY5x5TSv2j1byn3o0wUC
AM+CMZTxXFnomzuX2ZQqxaot3B41xyhY+1O4ZoEk7xpX1J7t1xTwt3EyknqQNIGrVWdfeZvz4fVEaVaS

yN4D+Nw2rB/OuSJzQqzR7Yc0XUqi/h/O9xyzg1A1XlX3jl8o2LxSAZFBBjChTgb70JZ72lre8c0pktOB

tJhwIIamSgZg4Mv0DiK6CAJYv2EYLOlYh4Xr12J/6P
0+MsKvgqCPVNk6cmMOkoVUmJ1sQK8RZf5sbRZ8iiRD3ecTeKkolHXLDX6aC07i7B+UnCrBFRb51iXYqu

MaP/RbrigGAKERH3lxpKhtj9ktRjEvSRkmclu/od1iZkJx/7Mwztb6vEzjf/j5jCEtxxi+aMJUTsyG2k

5q2cCQR4ZnQY2JP3PUre2AXoZX7fniqJytxM359X12
mR2X6vfXYwEgsExxm106AAk3UOLbMTLb/MRC0VlpzKBA4ZAbixqJaEGDIf+twInl6zOfYHuQVDYvtmfU

+vl/02W7ZOp9xd7fAM6CVk0xAdCLmI26qvSrXevCFCbLWdUpjqmlOaDceA6HmI5ccbkAoxDFjlU/h3XA

XBnaVuS0MA8xf/NWJY5qf6gAP+x/hjsAXAzUvzw1r+
iIWJ7li9GAu9u7jkJ3yVAXgSzdjYGDsTf7H1eV5NqYeKymlmsl0I67OGRJWD1DQDNH7yVU6+U5Cqx/k+

ch8W58sGFnuMZJ88vUmVveOteUEXg7mwKNn0ZvLeJ0TM/KK8y+PtwL+IlfsmZytLR6EB8bvD1ClwYC4D

/6x4SDsT2IYuuzViY59b3U62X/7vDwQKkpv5mWkpaL
lvt7HhFtWessKeKy8VUh1kEvk3T4HG9Us/Fb4d6uUElInDgPZRT8otVnmG0EZwHz0QrnM0EVZbcsbzhc

oIkt8MqC13D3vEsxYkUVchOhxYXtabqsKQmZVW7aCPvSRib/obNd/DJBL6fuCqOM7f1IIDptOdljpouR

CR+b51TRQ3xPdn2imfmSmFqdIyJKkmkhN+UPIB3EEK
mPMF6363jiFTYAW5tf6W35MHtw/8a6/BnHDvb6+MlXDW8MGvtdo9K0cCHr8RdYrpGDnhGbcVBKuKp8Wc

fxJr5/hSJFVlkQPDvrKl4PSziTbZLLN9nfAtFiSWqe+s5TbdOHcsPsY1r25mGLiDfPA5c2NvUgT01RKB

GQeudNqPJ1V4Iz5S91u24A7Y5r7rmokfbazpGe1HKJ
tVwcSVKQKxDdtEIJiHzTQQFMV7i2FyFZ0t6ehl3oCQ9xD3t353T5Nx4WVolVZqbxNtWcga7bsD4pgIjP

h5P2AixrwU8ykZ7xkj6f4ghIXDzAl2YsU+tN9jRPk67/vjsfotWh3brkFzfGbxxWbwdrNIJOUhJuq+A3

WwqfxeDusNfLzGBhixdYjIjdK39vdTOfHZooupbwai
nTxL6VMi39X7pTfgpm8FcVMKtIDkgbg/s/Vwf1TQWS44Y29WwqFsKWla2wq9OrmDJztFAeuIki2joVKG

cplyMkU3M1TPh4oi8tKnegMbyZ2FOHWxIiqZprD1k3n1OVWrAmc+WKFQQIafwxVaNlxJEug3MW9TIzAR

uQ7P/4BPdKMmWwRm+x6fbCgqg6IQQutuKVSkJdWuYw
ZEvz34YnA2UXmoCkXxYsMuBQN8jf12UnCaw1wlsyk5j0jvLbYvjrrgcUvosulXYyms14IlS5cgfsaK10

cYBkvsHEcM26i8EMxqhpUKB19lmA58fOq3NaXrS5dGBxJkR4+tQAf5rxXH9vSnMR/KY6tr9NQdAZFogb

ZnZh9ViYh49vewpngqdvS2sf4TmE41gFvg7Y7zAgM4
U4ogzhSpPpE5X0A6qU8HhDF6HY9Zi5tkl8WgCvI3pIm2wWqwc3l53rP8LbPqBvrRu99PkzUCuXSVYVqr

hEHL3Mlml6f28iDoMkFzd7opN25/lKtocQlU3bhPLd5Awcn/fAct+tFAxKLserurxGgkPqb2INP19C5D

+TYIl49r7feE32kWN/TdtjX5FG1F33Oexr+u9RI3BJ
w3FMs64I2GCg/3Iynu7zBSFds9R5afoyhrvFCCY2o7/RVj0XygHwWB41Fr4ozF6P7wCVblwQzu/NkNJu

BKbfa3AxJTWpsUcT07uoBe/ceHbvjHsgYlZOCb9C/CG2ZpwrPKE/CyHqW5WBR7MuO7u75x8Dj06/VRab

TOFAbAmgMOe9LJvJADxg5qxIXb13wmoN+NM2Dd2Pdn
UDvTesXyb+lim6ZGWj3itdKXbc5iR4of4AKx/yfc7zZWDo+x7zA/2bVFilxx6wIyL+u0PlAmFc5vY6TN

pZdNTDAM5/k8ssUMwa0vya1CbZQo+9Am/HmjnJ+7JIJSzH0X364VEg+BFgceLTN09BJE3XW4c6xibBRU

sTosIY2UTclhSoiGggAaUcuXk4mcIKL1P7Se0U1SRJ
jAjz4f9amqiksQiVhwCGCeQpl4Cc3raTuRnjPAazW0xnZE5gpTA9IpWgTOuzI+RQ2rkkeMFAoxB0xdHl

+w2gYZoBfJS4b5jxbOYerySlIaBSFOyuuHuIC0zvq2lal6CyzmPJJgEkVJkhXkmn85UgxDb378L+5Ndz

mhsmf9WW4rMk/QLQ38kTVDND8imLyKS0OmrKB8FDhV
QmEP7I3s+xdOI623z4TYe/LDspNZJh3zYmHOddF4IEtIqtsclqoKldnTgG9tMxgFpdrWb3AL4d38bgy9

BFOD6cvaimrp4GSBeuwQmBb0RU2ASdWdiJ8704b8yAjL0c7M9vqwXIxF0wv2W/lcM6Bb2iWLEEjMX85A

iIUaGjvMh/jtsZ/njSL1Y3RH1k0paTYYB8j8f51nVm
yuNTVZ6wZgQXFWz1nnt2yqwc/5nGedCLfsEPd0MXzjT9u2ijUFuQBksjGGhLedbR0mq8pZYrOIUPmSHw

SpgUYecjKZ8w24Mgvro4pxJrYkffGLex1pVlrNpXqF9b2dvNIb/34fR20RYCBgLUGczuBKwTcxXNQ8pI

fHoby8Y0N07eCyRAjMRtB7QiI6YCJd9nfB3cg6kF2M
yQ56uq5uUWE9/pieKg3Fj3DL9ra+DdkTNTYFL7/KmiAcCKO2v26qyTHDGJs+DnEbJ4hQtb1l38fH3ILg

WiuWwylmV18yq++NYNpyDVWKCH3bjDSvj1HW6XDj0JY5ne16h2Isdj6JNOczNksggPxLHzvHFbnkckPG

fPkR0IhCfmvKiOQFFZf6z01bsSHPjuFbGnhotE1L+5
4iTM0Mz3RhAJ9PJT00Y++0ZiuXmPEclJZSkN7TN3ZMM+wAFdh7CbHMix3svgULn/efQAQN55e9pir7nX

zfg1Laui/tApAXc0Pw1YE9y7d1Wj9gdSKh2O5lOnMs4T5KzsfA8oNm31ZCxBNuv5HRvvl7QEsASC0oZq

LVQHqkelJ94vLN3qtKgDxcF7pbJu5dZ4R2ov12U7aK
KSCoSwWA9xClJphoALzosXOg4zQhfCzhjwcumPFbZB5Y0I/0877RypNH55CT//XfN+zCbkR5Vb+5Ko3i

wv1A2spKmtv8YLnYs0ntzQJ6lEOxq3anjTxXi3TMo8kT7VYibGpSBAykUly7A1IX5SI0ZbFrh3fxLWu0

2bpRovlaHA7dTTbEirR5o/PMEpTxi6N/Js3LsjT62i
Nblch9+urMkqA5qU6dRz/Ag98tDW+iFCfjCBv78yPOCQvkMi1lOH7Ph6J4n581o9HCKYet0yM2mSeaN9

MfQhcz2uyYY0TfzGXNox5p4Z81rL5tmmXqEyJS5t793vKh1fvAiq6fSQDOh/ZLXzHtlHwdEZJLT42CO9

lb5TgeUhoeHVi1bSK61RE96Kk1p80Qi96DMehR8nRf
qjT78HcRNf2eq4TWepMp3+5gPt34P5e/kYn25/cIomDecNoubNFkrE0vFpHwJzRPrItNac41iozdRt1N

IO/QQqz+h9MIst6qe1K/AaAxI/AL8X/VFRLKXOk6R9nAMEwrFYPWMDwKkxaL0uvMxbUH3imCMAaLXd0m

ROggEoWaN4vOuNp2COw8j2CDPpSEun+s3d631nXxvl
0cu27ucjKG28Fovo7xQl9TUhDc2VcpRpRuLTCL4NWEIdd62Ugfx/qh9eku8HCKKmmuyqpZb0vIDxjk1k

RrVkDI3+DlTiDs7NdytRAhu9B9npkR0kMFqKdr4RuwWId/A7fraNELkh1kjWdWlE8lngxrBLJ6X+XZqz

9Kx4l8/VT69PvRK+nFOZx5mbySvh4C1BLRdnFBjuP2
TicCFedZ1hQ4OGyPWYTSCL071sa5P2XeSwKw1bIKv+WhbbhqXuYZ/mQ03KHe1m2MdQtIBGkYWl6ftGdN

FzRtJMMRyf0K+PEp4g0tnZ3p0Cr0mrPbbcR3HpDU2Yc8a9GKvY8R55maSnmXgutJW7KI7CywcSf/Mcnr

GIvhxvBfCG7ggASb66cxuOEFIbcjLJAjAXsZZgukzb
OC5WstuMxsZaE6/QjvphTlHnhB3H9oQ0YFJpO375hx+36+BE8+jvoSV0idF9nk/rVQLn45JEyimt3mgz

mDF5epDgpmburWRvfxAoSuZLVBrrx+lmYy7W7NhttFiAU6wWMF8RPoM1LB2zvib//65c9lZ/LE6kusRL

u/5L4BjJMehw68WU9a7LA04R0fz99K/LEEaACEfcwV
1pV9r4t5PiCb3+a69CujzpB2BwtIll3Ggu0zF3ISGs2caO7rfUW5q68JUuqGXEVJVDhhK1vGXwe8bozu

dqxsclsMdxFGGl2y2KoAX04jt5wut2ezXbcXSKpwjPDvxAemJPKncQEQSY0W2gMG7wx4Bk5cN2zpvmP4

L3HxrtG5w5fv/aFcxyz1O1B3X1xtBPwOlOKUVm98zH
OS68L9HiJpiOx+o6QhFAzdUOUlDYR4w27t8qUOEVFI+2F5IAF/sRK6VUilsgQQarAlZcNeSdAaMiGbrh

zGrmFRfRZ4xaYsdqvw9bk1eQ/nlQe1HH/JDweTZ4VdQKknqlr6mLBXC1prkRyOSXNj+zshoNJBpNqQnT

0gonM1/oTfDqz4K/m9hKJjG4RoyxA/eQ2gVfy4S7C3
Xr8lnnkyqw3ARxVUopZFxQu3xz2xfmboYLPFJgj8oDEMONCjKX0eY+rw1ZQzVTUew4icYkJdD+RndlUa

GwoMx5YonlyQ/O7pEaT4oh+rve242JBOWQan4CxDO1I03i5z5TyQK9sdMcccRLkMntwtt+aKZtG9rPD5

kJTgp6G9CmAPOpvdnvK64Dm4mEsfa26szMbJKIivZs
YiDrXefN3lWxdaRLo+XAKMWUQVcfNagiWltCcz82MUiXFgafyPtDV2DX9z90LjGIaZ6bIpIYm0x2z0Ku

9ydPfNCcx0njnMCwe53k20m5g0teqOWlCsBgDudt3yOklCZ3Ms6uOvOggBO1t4Gkxuj+HSI2eZm72Cs8

0+S0tvQFKTN+06WHHHzZ1UnRAMkm4SWCHyhEhF/gwd
/G8250tvdkwUvM4u1VIRC2WzDj0uPgDryewD3H9G0A/ryPYlx2cdDxqVazHxEjnyBVTGUKcHsRzQCB2i

du3BcQJK3BS0H9nprc3xWvpMrfs7J3Ru9QdDPe1CCa3HRjrB8uz2/9Y4N+HMJygX8AXQEWLucIQ144lB

bmHv5ZDVTxNCmSD/G//qqabLspRE/I5IsY7MvesqG8
CfEhW0Orp5BS4kwfFVDPq7qMHe2JM3n0pSXI66MXqxy+Gk8ViLq7cybpk9pF9A11j4vx1TdMxPv43it9

IYHSh53clhYK9r5e2v8873aK+IuH9CIOvb8IXeDzLDXgswUpX3SgIsPXZV4ll+jZ/quAD4eAVJlG5TnD

VAckbes68t0v/bE2VX6Y3EV2H9mKmVgNGmIK1zwFnm
dxGHE+3esH5d5Scxl3Jl62FNxx57GB57a9l0qqDLmiqqyKD5v0DO8zwQ3/+9IBXlxVvmP+06mP3WoJJ6

R1GTyirpYQdtWC3j2ga4Oh4kl6j+52Je0zf1S/gnG7S/yPFVEO6wDn8jLuo0LX02mWAO3/g3BPQY/42d

xii2Mc4dQ50I1Kr8J8GN+vCwUeu8+Gzfp83jq/iJq1
c14E/8hJTQtikQOmWwfKC9npx6kECBm+gMDO9SYTcaQHAQQdp6Nful783oqCo8L694XQre+1k/yeR8S9

R45Yi6HTvo+Hc38S0zGRBL/0A7izJA/5ci4ZfaZ91t9LVGeXUwcZOm9xG3Bi1KavK5HhN9kdl4SIVTTt

DkIFe96cOJOqwULE2G29GjJNtKja/FYHHuRhVbXnot
GH0gbggFz0qsF3BN6e6MzQYs+7oCVqNONLve/yfLDsitTEi0UdMhbHq7O6yg7u48CHm9fm/J1mgNOfRs

ILgK8Rxvk2jGA9noQJ0cK4ccxunxSCWQFGU+73E72/49QfGKAjbZT2uQZR4y4neYaPl1kdbibtUYZCED

gQdJIVwjLxNXqUE80YgHfEu0ymv4QteV8pwDW+GRf5
dnr1hRB1W83aHmSPTfIYLEIqJ4sEqLRH9iR2739oVUiqckQO8SWO+WGf6x4D74p+x9NIcJ6sT6yFjHz6

RPM9mKJqcgzPHy6Wj8KQ6ovXB1U0CSStBvhz6gq8ZPBFTubZohijnhc7seUo1d1dKEnAAFagjsddh518

9HdsdTk/R344rlZ7wMOU4RRbm5U+11FF6iqzq1Q64l
j6F7A2DUHI1vjSFcBer7YJiY5VbyCE7oEKuo4+GMPzfuBxQ2EL2WKkIfdz7vJPn0oXu2MvqkkI7zqPo4

086OJaESo/EBScOrLYWlIVMNGCvX/rLcWhEnse+dEOC6T3gMDKJMvOVXis80KqTtLY9uRO6gezumEXl5

/vAf/rJng7I+lV9sV/ohrZz7EszuCYg/mflaY9kfHC
iZTE8ZZeSpQKV5546nhBI14btqCrGMSl9Y904nLzhzPrjLgJXxwnTAdhO+BBMJiBZw42aLaUMjSAhE/B

bklkR1Y2AN5QgqT0HtEoOJp1BxODzCEFoGmw3qJyoKS0x3Rbc7t7EO1t4TMyBAdebRTsu4MGKcGQ8Z3z

AfldluHY3Ot2Mcs1f/oz88q0c++Wk2678t45gQqP7o
G2/OwtQz0f7q+zBH32TE3kGQBwm/mgkqQDgF90muL6XfkLa45EB4lt3M4ohO2XMgeko/YqJe8JYLwICp

D2XvTV45clj9oXWpmdaCnB8OTDoTEWQRKSk2UvInXCMMYz3XEiNzzfGnECUzxN4hm03DGZEwbWk4fJ0r

kabdMs/5GsYsEliQuwFq57EQIyKAaED21pH21BgtM9
GANzJeHhPkcckNA6lg7YusLNDZ7VJOfsVL84RBKAideicl4si+HYEg5CtuhAwwdELvNUmcrZ3v0QNoeL

ztHfI1OtmENUlvo+vsnY5LYcW0grhfRQejTfp6aZIzNyq5bv74KV/2PBMmj04a3VLbqFJSg4i6/nstTY

5xYaKjdsuG7KoFy1i5twHK5kQCk1C1qrAZJcGS9VyY
Op5gTmZ8yE98pJEDDy5qLtq/CjKmxkVZ1MzRVQsIst1xUvEx8rGd3S59lBriXWKRohHQH6DvMM0u6D8l

o/TgFAkn/YJFokZx3UVQR/d09SqRK4mgz0kt4jZbbtprdRcEM7MKKMw5I4oG9j1o8oHjGAPOsv0/rk/i

PloxO8X6qPMtoCm234WO5t6LeW3o/Uldjkfvg0RlAc
pwayKS9YfT9vmoab9DDDpjQHoeQDfTLriE948Eocd/SqlNx2MPGqgzJkuGWY5Ng1zF0blczXcUH/QwDj

BJJ4LZ4rEFk6ZiMQz4xlOZCroJw/aq+gCq1kWSXdFKSCgElLO+1LafuL7k68SU8U4YtkbLf1EmMWmzee

VkR17dUyYhU/yfVOgvH44oozwCBUI+4mekVV1KYjit
S9qA9b0si7SqoqE1+lLxSHwqF6gGkpH5pbVV2a6WhlTD4wwc6vjPdqOpX8I34vZqBLdAXVTj2hGX6cKO

6zzZ8D3cv0Cb01WD80fzd7yMGIrMCK87/TqX1HWnEgRoBp26zVyYndEtsx4ZCiY42IqmX7NOTq1ooMdk

B3Oe9u36vZhN69VxERy5G2BfdNZV1eF+iyr4tnFFE7
QqNMI/B/kFU1GVA5bH8xji+q5+cgOrjLgzUiuf/AubfixRdSkkaNm/2ddnNf3X64YwOofYLua8ftebNM

GVDCNUokwNZgjgfO3M1kr5YF3+2Co+LXVgTX/Bb4lDKkrwLQ+P+7hZTx/3xDNjV25a/xyV32P4FvuC8S

bNQh361BcVyibhVk1sNLQzX8Qpz/SNjzmhuIdAFCZX
qZlx1LuR9OsMRQXqFdEqYhd1sfju4BqxZ5aNZyyi4yd/G8Rg2pnL2ckMniVYofHuSOa/hER33+qwu0F8

g9250mhfacIgvoXqSThXX11Pi7cUd/Fw/K3h0Bjblkfk/hm+f+R74qJRXYPa5kUpT7QyAplb1JTvNQvy

tMb9B3xtM6bE3421EN4I2OBC9bsbsmtF2s7AZ8BYeT
nWSf1ysGUbGz7TAEfZvP2h3YHf7vCbhAfcJgUIUTZxRjCREgsWmmxa8GOQJ6GXi7WVRb07tkXsXKgTG8

w2l3Rmo+mbEwgVkeGSZhXxMvRxp8/Y029MGlppokWbMzTrVDIu4LTNpCq6TUH+blVMb1Hwcpwlgeo3xw

IZn2eJUDVDSlzW5bnkHO/oRGVNeUbPDfuPKy+/7VnA
/K33g1xpEHGPnMSvwS1Wyqm9Qyy2Tq98U2mpKed1UeQNwv1uV3s2elzNKXinAgUxEjk/JYB0exK1SGoM

PObeUCevVH9ugFNKCiYZTiyvyfVLcamtNxvJcY3JU47forARRIh3IufrUrDPqaYUUPZ+u0fKJPsWk4j2

llToa9PCwYRE4PyQ1djR3jXBsC7XeJePOHG23HAcNJ
w+ez8MWcfhUkhzUJC2Heb6LhOqhYgu3CIM9mdZWm/iz+SzEz8ndMvL1P//t76b+/2ng3b5AOMMA8wrpz

KkA8KH+c+XudRFuKLc17Hji4QapBPdaWrB0B2eJRkOGuSAqgCn8v1jITDihWKgy35pTofc/dbKOYO4Y6

bZuhVZMwhrwkN2hE3H7BANR/uYK77BC124pzw23cg2
D0BO+TZL5qEbunyFl0LSEsxCA/PyV1w7ysyAfK3KXZfV/T8Xg+i64IDmMGe6S3Lilv2V4jnJFiI7O0H3

RPCo6QH994yOGtMAfzzkLdpQxTgvrzKE4L//7wtKKCPm0IBJZlswG2G1vIWY3VNbW6vI1NceGNTTyI0k

nngvHr0rnOlbaUusO/9a6DR+iXGe5xhEbpl06k9RQb
qMIRQ7kYfjtqXA56XiV5Xm+fC0iL8qSaaeIoBIcmSDDySNA+Kj+5fVCZUDNtk6kItkNsS2qZnuPdgFu8

bLTAjimitf3ZlFkqr046Q7D+Yb+rEhkRENDQUP4dJU8VY1XP0S6jViC5YojdnZjHi4jHfgBj/pISJUS2

La9VD641XSyB9w1S1a3uYk4Y2XhTnNLOusmfJsdG1n
YmYROlZmBQmRmO1Ajb7KwIM9/xR7qScYKkDp8LGkAhCiQWs5DGnk/3gSgpVQspv2GKwQ5n5guazSvZkK

2U7DA01ZyqQufx+znX8uXInGjB49K06tWv5DXqt1LD1XkfPp49G055B61wcIL3pU2xK9yakIM+SDsSsE

3Ch2bfMwcrOrIjnYVeIZSpdusP+AWX+Zs5LwYPmpD4
4V2lOcclaXPX+V6lAUlS15ivfceTKPKKyYWwfzYsUsy94ZOEroimdcB5FabeL2Hl64cNuqjs2wRJDv9M

QMJkhY5a+/uTZikRi6B+I7n3JJ0gyyMvNpTt9XPL2Ta48QLD6g+npdx1VaBIuUd+6UsJnAOAcUE3MNgk

t1nTNxVzhp8V3pwPXTNPxhkXTKOU3CcOVuoQtHJJC3
p4EH5aXOHtI4mbGihxxLAG5VvHhskqs/7tvKlgQr9yOWrN+vkB02j7J0YVxU5p1B5w0Ets7eViOlPLd9

hGuqqxaY3zAbJwPXYTO/rMvKAmucXMRVp0vDvbJIv97Iw2BaKFRV4m908ukud+6LT2nTlLegcvrbIOEC

X/wg5dK9t6cA5Z0q4O7x+Ey7FDNvNM6j/v6AdxXNCz
kDMFCf5u1L3YynoIz6azpmV7WUP1B9Hq2tnorJa655yjXg+vCP5qUo1z84/QO3DEI4gNC/aEabNrpQj3

0uXCRBrxPrXKo6PUbWCAJOifK+Y3GaPkaJWbm10scqtygZs0uHDaiw0KJXUjFSNQEUmuT7+EWoHXqv2h

tDXSscN7Kz+zJPS6K53QFBmp6Lw8oaWiAhWsY7YppI
CGtQZMM89Y8ffgOQDGJ2mZCbbz73eiI1vZ90It9KzIS2E5UJEwCCT4MLlrpuFsBPNYXy7/V6/dxzNYF/

WRq241x9J+O6ZyYOUMZkuGP5TsJezuT8lrhO3O1WJpj3QJGVfCO90/fVhaCaTZ/+Hk6i9BJDjSop7koW

MRWql0b8vPJPkNGIH1NdGonvAfsAIiuor9558np2G+
VIwZaO3JLYieovwHOffAGYNTatqUqHu1IskLH6fVPdl+c6fE91hzimK8NC3WTP/v316LPqEIiXn8SYba

nofsC7i4JyEkPoyPhuHOw7if5QlUTsCQ6I51Bmtu7J2Tw2d1xUzXXLhUQ9ZDKtRc/XyDY8oyOqyCNCuJ

XPbJvI/uxYhVnhgi8E2Got7S7jFOaRs4fNJMdDIcVF
+NL52ddnSeJzKo6LLGMzZVVVZoG5sPJ0t1LdU9UKQz8Mq+4r3cQXkw8hKsGyN5Kaz9dO3wn62Cn667O+

Y9uPWZXwhvQCtF96lEGeM4awBK0z4LlmROOvKR2i07nWnuMoY9xgwswMVC+TYIVwIyi/L3Uval+Tt+yO

v7jQh0YDgkJjxn8tI1BCEY92HHTK0HNbWXUQ4YG3ob
4pnElJNnG6N14eJpKab7pasKXjNqWbM9SM3Bbk7QH6n8+WjLA2xcTX2dTjNYHulM7s9jyho9Up3UoNl5

KtIy4bivFVrsbSiysNwiHvMv4ed8Y+C2zWx8jR7mpGPDDuR1LixpeODxhjVGoUkGZhzuXVh7NV5Fjsyx

N+Wq7+A+u10A7MKAMIk8RFrnt2RzaXaUItvnnIsjUK
DuYPsDBrgJHUHMVy01A0WDalT2eOawKGIdiR1xEjxetrvchzDtJx6WYhiQZTTaj/YlA4SpzieWizKAlO

OaOcO4U7yU5Tcz5WIgDsMd0wtmsF/3vwobEGF7nvl6gpNb3VsJ/qKVxX9k3fPBtigyp3jVAc2krm/RyJ

OhR7VtMyjQV0xN/E+8h7suU0s18XfulGWvWY+PGV/T
ItADEPSgC3ILU34JPgk4AJOzGCnrLBbtTFUA2Ux5cUK0d4kWZYAYCMIB+pTsytXFnuBdtsx6cDb1dh0M

RdVp4HRG/UyBSBQN6Ujx14ACrvSewtn7r3AkZiL6chjeoUF406G82wwkvChifJ0en8BbyBEqboqX9iP3

Q7oeRmF9Uby/3W+lqLwAxEcPXUiRP1PXMo9NT9b57/
hnJ2NfBvG97VZhNG2prk780wh+NgvEv5c73IoKX6gUBvaWaLFOGSpgNw7G2CZi8EhekvcyMmws3FVPHj

6gNFHv0pXfg5f5xxVdO2s8by0gjUvLTbuTe4i5QioXt5onuzKn3vRSKaGbzf/qv3W9DnJWafuACzwvXg

x4j1ih0vUY/xWyltN7VpKe4H+11YO/D0vhu1wnGBWp
GjuepMVQWkasdQk96f913GhkxzUGLmJ3EjCTAOapymAdCnW1i/cVlu5aQ0u/Ql8f2IWBfqJX01F3aB8j

S/RFlyGjoBnOIU795f28Hg0yqSf6GCoOytTH2gDv5NLfMj0hkak8CDqoqinS4NZFjfrSEE6NlW1DIJcK

p8y0OsHSz/iFo/jDcXUkrpQpppj6Hu8mdh454GsVaP
ViX0pRYv6nlACoGPTfqaCVnjqnummL9NsIHpCm7An/qsE+yX7DGwNrP+cqfz5+3muKzr6PqQvG6HCAgK

GMvgfYf/LPub5NjHI0mvfLt2qTtvykVyz/IRe8HJMiYSuuO+g7IdKgt3PuCzfrMYuLhsWCXZbxdCpXIg

6z9hrrJbdJWIN0kXLu45/XNlqYssOALeEb0+zsUpuG
2foI0J3DakX+Wi5/ydgm2lhvVaY9RWzzk3sUGEnu6o2USn3JcJMm0cAYrP5S7nQvRH2poOzqizEZRH+3

ghdC6iyH4mzWDKcF2bqdA+bclzg2zgCn9xPbnwwMRsyK0enGwGXcquwfYjCe8qSovpogStmaYKf9xlcp

blKwtycQBZQK7HBwQsQsbu0RkbVqQoKCik/s3ucswj
dMEGP5Cs76pWSZ3onQgsn+lux57z5Z4ScjpKUmwDKXxA0Iofe0v5iwTp9wYk0XXWO1IPrteW+reKl3B9

cO4fRag14IdViM3mlR/9eleaUElpFhn0mf5I6BtGWk8vDjbDg61pZMXVWZ5376bi3hI9xoTsAUPkg9Hl

Z76my6t4mvykgV9YhYQhkX9w7ZLs4FO/gjEk8bHyJr
6dfQdTOs3fWj6H8IwXuSL9ozLioKYNNiy9ex6ltQnpHzSVA0Gkf/qG700Z+ESR6IvgGgXYgE8pv41frI

9Q+Ho6ftZe9QUmJDBEzHkKHCDklaXVDpsdgcDjS6uaSyKh/0hEDFq9274gp+f9EhmJ4v0Sfrqv869sX4

PYDj/OSuf+MoUEs0W6hU6o8LOj4PhIz7nhg+9um085
xddcZl1bZ/ygiR4a60w8x92lfm6a6eVrXMECOOVY4DaRMUUKuneWT9TRabfQuxLXm/5NRdQUbtvYiB8a

wUpImSR8hzG+J07WvDnQXPft1O+cMxVgR8dTOC5wWnQejjFP2dIZgGw+a50UROt2b+Gih349IZUIwjWz

n0S/W4tSlQu1DbaVuFgOs1kNKJ5CVrRnaLBYeHR8Qe
YgBoZKx1LHBbE2tLzl/UJvB8ZtWrfjio88YiUH44ARPRYCtlvkLDeDA4G0Sxk81JV81ATPCvJ06M0BcG

d804SGbgp+V0ncSK6l52oYNvFIq6eVM1JBwd23v3tgMNfOGzxUQd1LHOFDbeWvkS/sMGzVeH92WxqIgE

RKMpKnLDOHMJVr94erN1oE1gPF1W5BOM/EPk9k5TX0
I6HytQgvdF4JX668KCSejR/2rmVIzbuDKizXeoQItfGM+J7e+aLRBbzXcPDU6qF0yv6jEnX+JY1pXhax

7M2GJ6RpF6qcMPuTQazwNeaLLlzucy085sMaQBSVwnO4CoUcFp7aEf32Qsv1wy2ySs25yQW+pKDX/X6n

BnAUB0KMP3XI3NR9snRKBfGuMFdXT3Ou1axoodDoyE
rvxNscpKgqDU10T8yQ3JWZ+9u3sSNv1WxYpqRBUpsy0HxqeHCbE803Fzf0myVweL/FjTMd0jYMLVGyh3

l4D3Op/H8o1QA+yk7m0tWK5GZWik64BHk26W6LYaIPQhPVSoHscxOk9W4eqJyl66zAfbXAGObvr8i00M

t9Al88tbBNM0m6e69nDcEokqLRZui79+aOgfK0jGEj
IAWWdTz5xD81ciwoz8SpxnUF5HHPAHbWvp0EmfmF7kmICGcCR4GrBnKRNOk8jkmDwKjnARvr4jlzYznY

m8Qsj9MPOOkZWA8W3omUPq5nQknE5GN65GcEavQlr1QTssL2pXkJqGy0PWumuuK4Ebim6Xe9f53moVl9

KZUOEuWM5FaGFXcp8PaOubU8B8s6rav8hIUKspuRvk
6rnxCwlWs8TvSljWH9x7F7HeYhgAQbe57KlRydVKnHOmP36er/M/kTypjethfWo5OWYUH2hHTcDCzK9q

zBLO6XQZV999YsP2K4Ix3w8DkVJB2eGxnNSsKOjhsBo0J4/5B9/HlMiwV18lothqMW58YrCmSOiEDWK9

LignfuPZ63lpwHpm2YNd3rlM0qvmHiqAwsZja+Tpb6
LfM/5+YWID00zOpaQXgVIVSs0y2P0GhpTEl7COOJDHwkTfZL/uKrfeX9wdh4JMHxgkS5o39B1btvN9eF

r3okq2CmN/gW59RKEC/MzNAA2fcJodCVUxmcXzaB07CnyVsx/4XCk6O9akq7gANOgzFGHYPOdEovQpwF

VnxkU1Kqpsd3x9zr3ssxv1FZQ/cQTzNKnSk+pjf8+v
DvpJ6ekj5DvYWoXRRjC2gn+03mPrXRel0k0ij9c/qdwa+dbgsF75xESOnt5V6l28jOnX0bUygeBXfPZ2

f4phxkXJwyGiv/Je5zpO4cSL3zBp3TiRJaTPNRHtSgQX6Aoa2gMYDhG/StXDjsh/OxdLxbDSgsACoSFx

RJ6RNwf+3++ZJZ3zKfjN9t0IbHC3KMmCFrJfFlcJa9
AD6ai0bAB98i3cI58hHSQiOctj1ZSKXZL27KHx6z3CJkzNgdwAKU43b6Lb1LCrf15shNXP5Un/tbzvUJ

qCaPooJVWgzSEshoQfqOA8gnz3O8lZaUKKokDb69x/ckbCYCJuwSwO2h3vPQB9ZsxnBocqc0bt2v1WIm

M+IC/sc+KCZisQPaqVVM2ruYAqn1XfBQY8rU10aYTy
yuftlRElMbgTny3jjuYlBk8v4nyrbLTS5K3+w9iYNW7zkuTDzqc8X3XhUnaSYZRMzazXlPM1Xbj5hKZ0

fB4NLSnsUjI33WcUAxBiRyVKR1yV4iX+biH4vgHaUTLhm6eopAgp9iZDAZ5jI7uWizjiPixSKwGn+kUy

TWYEPkAtCiZDVGYnnNghD6lFyDpAo1gRuhc+vH7sZl
XJYMMqAUKizozBVglF0lwBbO/t70/f7ECApZoEaVqi2D/b5zhAGU1CBPgeGhWAC0rDuK7sanQgDJe9rE

Erttcp+9jQ4aDsaA5CV5m2AirxvoBMGjoXScRNT90WZJyX47U9MvcsqxdusRjsqlrNgd0buTsOi2+o/F

jnDecF7e36aoc51THNin0J8q3Cxlue9dsFmf2TvwFD
bHOHM9CnmklHOW2PJwwjRsoQh3Xdu1vgphNSHBZbZW8Io6UV7XQuuDW/C+GVkMwQ2Lv9JZj99NK4FSj3

JPkr8BtDvxq2QQ97psEbIwh81e5GMx78zcPRr4hfZ/ApIuBlzoNfsUUrg1WjcvisN2m5XTxzu/3eu5fk

r8lX/FrF3euubBSdD9alFFCiKUKUJUrkIqVcwanIN4
wBcgAKfuRX9xlgbYEnJTIW4SL7do/wSyNMNgLc4DoosxBDsCtagT24WbV1ar9Z7/frbo2HvumHe1dNZW

rTmtTotBYyiVNX8qTs850vGilEBYYdS3/De5s6GLUkT2a05zOkf8AonMjIddnFlFyxwA7sj0IknJec5O

t7vjnW/MgvRJpygP+WQqtpnSVmXwTYem0bjAknAQ+w
iT8wfFhBMnnSGbU09Xolvdb/gj0OOZduLPxV5ADQGfg0tBxDDDasto8QjHVyRfr9Pi4zHHEM40WRoq3y

LGGnBBV4BFhiJ+vsj6952JTjMf+4UuU6L49YZ1Hy6z7Vkr2ZpqJD7rp0I8wwyE1o5zNZuZ61x0bWa4mP

c8eaDCcPGWqrtZL5X+lUKPquYrop4NF46qV1CeToK/
BEhkqakAAl5AiJVslLaRAVCaBmCmmQvbIqgD3j3luLYPlgzWpT+sUNmnE5NsUdCBQ2RHweK73pljsgPt

v3bbAEVJkw/JinTdxbAzqFkDMq5eev6RjNVjt6/t7nqAqpaKme0tdje/TCrFnUgfWe2kVg9FWZErKz7I

QwoQ1opjXkejUno+B909c4VpCM56KbrHlYbQ8YAlht
SgsSNINtq9IvuWFuD0U3iVy21XVUtK1LgNzOKFKWdQhM3O9BC83j/WqhDjkmzSv3uBbkopC4glAnNY3w

JgaNLVH7yawjYfCvAhbXyMizI10tdXFKFldCLBMgJJcG40qN8pyvCvKfdU2ZChkP7QmxmaxkUluFLQn8

THO2/BHtFTvyZ/tnccl9CteLn6hNN5y9tdtaSzkk6s
9ExhdzcmK8RMVZ+OXWBh3B0ELNvQ7CdeudSe+fzOVtaaH7RZ4HJEESduVtWtSH0MXR7opXKp85oizi3R

80D3R+qHt1+/7vpK65+QkKkWKhU8Q9zDY4lrNsnAKNpMljv0QK2P/I4gApCSTbUK6tR0E67L2M5EA9GJ

bVvnwIOllse4IBV2jjjqafuZs+QsGZWmnj17TSFPv1
uPeDxnSjiWj/tzMeY2tE50FNfvI19bHXC9ZJWDQLhMqmKd+zKtw9HME3ubp3P7ipvn8UQgfYsSVJrA78

QzPdvocKfHvBd5CkLMTvrTIbFn2UrcMWBSqgLZGGB2LVxAKJJQfcVfmLlIEcwumHZ7SxOsQmh3YBKh0x

SbbLB+bfUNrgxuupEw7+zoI3u1hCsxcjqwBZH+IElw
icBsckQOi33JAQzNrOCcNpnl5IRw147cdapBXj7o8EQec1kkQsJr1IKhLFjsCZdx8OsgYeINEzTt37jg

iCAghvfLlLD5Q74BeAB+D2S3FQalx5DPSd7DIwZYP7I5EOtWnvW3WNQLr2dTvYzDwqtdKHVj7quik8p4

6jlMMuSyvT8ul+o6wLyFiB/ii/ANmuGafLXZO+aWHT
TncHQ3kd3JxWgZWpUggo+gQzpcLfpH1Pe1q6owPJd15K5ftojVmEFq687suzJRmKYOmrXV9BNCUP52px

EnIPuobmccBtgn31mkzBhictQ3Dnzm9mpagFecYxiPHPo3h1h9/Gb7qxuMv5SSQLNekbB9DjUlOEIhAd

AA0VZLJ0QTDbGvAaY/tLmQqhdsmkshAo+5sKxMzn24
rd8uI9Gy19grpwkTjx/saqtNrVaQeoSF57Lbdx3DWLNQpPMWiIpvI8CPyqKt1Xv5aAxd7g+a9ygne5wn

aGauxjtOKeWJU0miYGXDo81uGDFbNfmTQ1I7J8sbpwWDAfyvTbdARep3k+eSRL198UpXCa5gojo7dgd0

3XbO+woUisbROF/gA8+SWARnyyl4RnB1GcbeQKukgv
IcOI7g1wgg9rF+H5p/HeKcsH+4Iq4tbbMrOv4Iwync/3iQUwtnEATh7rmGz4ZjysvZH5VNsBrvmVnXGb

qpNx2ylRd1zIwT0ez2UJ0M//0sqsy/vrvLHLOi8wWItdEeJWnI266ZTtuDeyTv9qNRePrWIR4cOJcSt+

RwJR6hhuIQ8OVg+uqYFAXOCMOVRodNm2WyYSk4a+Zo
uE6L5UV2BhEOuqIAMMRx4YhzFCiC9PTsUCvAZ4taBSJmmQpdKlrMVcnAfoeWBxGuijUw//AcWYWrAFrT

GyA+6G2/N/0oCtts/Q3W6vjgiUf0wXkDQsO3E/9AH2kxnmGzyp/lucía+71+VXoxiLzTH4nv3aNlIbX7Cww

b+Dm2TkC/yQfZz3Sh46pd+8D8A9lYIeW367QTQYKRB
dzgki8aINnIG5DiYb+yDrPCwmtLewVI9dtrxstalO0dHSqqm8WPCDBJkHg5KaXTsmv6IvWxxkSYTYRv+

lnNqD6TLgH+5ElIEfzFWS3ZcIMpjAmBrBtM5FAO40fTT3yz7BUtldExcWg+++LFwxePtwjJJUePYAm2b

WHc7PC21grElR0UwCIx1tqDm9y+1Rlm8aW1/Y9qE2C
pN8m1uBB7ofiLTTC8vfCiqvcV7VZD1gKobqNN9u16jHqtZUOSakOBJhvfqGz+zGIo4WtKoKxcRU76NO8

tAM2xFYvnzdv5puutHVVh5AJ7zPrkfhCloUe9pQz79AmCCAzVgiCwR1ot1A9Lp9hiwqH38HZZ1eWs7Ar

HwgzqRaDEjklC22/rTtS/kR3IvgLdiWpM1ov1P6VAO
9oBN+T+jjOiI9pNRcel805fbmV6TIyBIcYLDk6aC6zZz+0UFqQ35ZnUp16JXH0FL/iPhaYBe+iRp8abs

eZN+P/W+7dt6nl/7C+07BlYuvu1EGvyvhEV+vZLG8OahSIs9GKBiI6aNIPpw72eZvWAyxL+afPSQeDvn

O1lqoLv1+lDh+ADx2dA4DhXYWnnjaY+0yoTSaLZTur
a4v0kqpseklR3ETYY0Pb0Y5kbZNK2NunausY398bObjVS4GC/d5mlHVEOiMcGABeOlF9WACJjvcIwdcT

qsi1bWCH2jdb/2PjLFC+8LXHBanzAaIOU3ENzAqpdm5iT2KzHxVvROwT2C+il7G/t/7LKcUVbjfnwhnG

gpKCQCG5d+bX7ThMD7LhH/IU5EAv6WQbBk0TmUSRwH
OfU3FsHCDyeHmHFfAvOqYTE3f10IO7ppOb59f/7q5MXXDa68hL33o4x8pcQkI3zTlx47vDPEDViie0ht

reXfbdtfp1ZDC9C5RqKbn1d87vHDff/GAV68V1OK8upoqaMEkZSXyChz7dxFwGQ8kVSf5FylFG97Mjys

/+qcK7j436IT6+7n5hG9EkV/n+sxxixE7Jc2yVbf8E
eH8+xm0SB3qYwg+VpNVWhUKfCy6bE4QyVsOzYCJBoM7uFFOB5WpBAV1x5n/QbRp4j+1fn7CxIHnKqqur

lmCZWnK7S+fDdYNpzELv/ezzHocFX4tEWppGjaQlJJm6nCkNgsL4Ry43GgBIbzet0T7b2AlAOaMHq8Eo

eSScDLI7MpavpED1gSQXT/VWkvtCH18sG3a705AeXZ
Wx/8BOSLG3gi36/bAQI0jxBPdKK3E5Jf8tQF2u9CzcsRseY8WLdWYpnMssQYgMwBL2NmDUArV535BjYj

04FGG4FEJ9x/8NVshb+00v2pRjx+6kn9+UPL66NN8W53H1QrUj1oE6mtufIaSyMZqP28KdxrllmKAXAW

O6AJ808nwhdlaNL4xJ75K9/FKM/Vzn2vCOPZwXK0RI
3uqeX1EoVkO7ySD0ayLyjUOTR+nhmesrL9wZfUiKd255xFa6syOdv0bmi9jAyLtGuuEj6kv+EiN4+W9x

oXhq1L27iqUNep1L9xmNq/iyM1bH9COyifp6iwK8mL4wCSJrK9lL4XYHxmb+eI0BFUfg5FeMQeZwFDId

TuttVbOSQ7m5yy+gCRjAfcCfpAuXZTB01VwCTWX5Bu
Iu1aUxDX0myfphVSUK/sHWjzqYez2wP4sBAm6bnq1U2ACiFxlAah0Nu4zKdwiO9cVrqCk3o28iCsQkTY

kvPOhtY8WM/O+A5Wx/JCqAqd3qUCFY4g6NFqozLg18e4T5sDxMMBXCBIC4hGM8EYvQl5gpTYVoRPphx8

bTvq1JF5UNuRU+K0wkTaP/KKw3axTMGZ1hsPSHgmjR
p64SHHJCYl68u77olpZrNztU2X0608dr+0jcYq9krj6lBNolpTGPdUfdAaivUlGjOW8x7koYnOv4lZcD

oH8VG4EW3p3UaoT60gOzCymdIjyJwsnSmWZwViw8mRRoZ9LXw0kSwXyN4D9Ym7i+4JHKVTaW3zdFilzf

Ag/mSV/WHSESLqg7od+2LdKvfnuxOyOvfjD09nq2ri
kAmr5BjU/lpEO1XGEJ5hvxdM+nW6HmrClG+5kvqJWgJMkOPVilVfYnuGQUkEfwtpitjHy95x0KhJ1Hzz

8V20Bz3MBo522ZTx+8FO/M3JOSL1+E3nbpClD1AI/FLUF4YoQSUrKOf9Sbvvg/CVgJ1+B843RHICQBQq

gYQFl27grMHMcFyMV7uRTsXAmcZQl60ko/1IZWtRFi
i0VK2L2I1a5eYADB3JG9S1Sk7lVnMflq9ogq/yOf9usEJpALOCdpX5s0SmVl8wbGbZjeYx4EKC53Uau3

9idwj5JBQrjeaTBi7bdphzlN1In5FVGReGUsDDJ+o1gbGX1e0C39fOPksXkrmw8wdY3F6svu19pd7t1Q

kbXJYW7nFLdcTJ7nCfS3Na+uhFldGTJGeBdWQWR/KA
lrv0VLQlvMUZePAM5rv5cdXYEctoCWf91PfJPlLj2pOoUkXVqm2xaIFkPv18PsUDdPMtqvnQgoOjsz82

X3mUSm6nhZFHlME+lZ/80buewvgxRjD2RGYQOGZa8gXfFewwKh+MIY3ormmEVReEaCOEupQhePh2MUaE

9hiZN5uCVEacBXoN8z8BxDXrGXrWYa2BHXo5DENh8w
bc+eZZCrvCCdsyXOj1ojOf6IwlXlEYBQ5hO8/7D2XgoJodLElsyLQP5fJ1Qhyy1kcTOghimHaK1Ur0oc

aVJV45XGoo1+ejLeIbFcG6NiUw/UZLrCwnE6Lnk6wAf6UL1WXoujbhFovWx7M/tBsfv20Wo6YPYtOUb/

f4HtKM2mzp64+zfO7O2dSwOCOudd/QeE3LzOSkMXgA
Y5nQraD24Q4Iaj40x34xhMRpwn2Gppt1D9jTX5CTFU3jv0NxFxnU7S9/Nj1/Xf7tduUJWl6YiUbeVoGg

KmuL5PJe8UoD6gb62b5h1gjOlASiHDWJ1Tf9OQ76+SDbPgUMXT21aHP9MXFcsfpUiQ+cDQF6d+8+HwN/

cnMWtDZlC2/KBqkWxscrgoTsxvXnFOlMmL25Jj33dh
CqhSsWQw/NR3skFNwUGQyFzESV6PydDC8oSAXykyFipligiq+1MHUSsePNYkluoYPwKlJw8sOv+lM33s

Mi958tFZT45veBgga9BDun6Gd9fyOkJDpfKLryM4ng4ka6V/zOZyYGWVhTEfmCG+OPs9yTPjZCvN155I

Z3dbINR71Y7UrQFa8q/ske3YehedPp4zfjhP++Dhd8
JjQzHCGQn0vItjpKnX7GQOw6hTriLz+kcSYb78U6dwF92UMkAeuCuDXh9sl3Lz87GwJ1fbieg7W3iAj5

iW+qHSgcCu5lhGW4FIzj6VKL73uIXlL4kMuySVFMm1qlW8nRjkiCGkRxS7gPw9MHwVvOgiu1rEgPswaV

zuszUGtqrZj7D66iCdmITdJZBLueO5oi5gIpuY9SmV
N9R/BWQFAjI3ooKvkR30XC+jcGGOCFO2r5iG2SvsUKw+IXhYdi5cm+onMvRoqfY5K85D3OUqylgIR+w5

PZSUIEBHSEgEuh4qg2dt3U4P32Cux6Tlnr7NDoCNt/RlphNmuozmKlAJQlZVf97znBoZ8yLRQoM8jrqQ

/8l84NE8ob0ktQ0DbkJhkZBjkIlfWZGHsdavhDtW04
D5pQ9jffuFpMLw4mx/eHcbZj+pnnZo9VmP9p8hOP8kTgHDtyLPp5kSwWS8ypu7mdqFA9U8OMhMZN+zbs

79qZ7VuqxoInQPNi4nD6xnmbxm1Fpa7abF0Afg4VWZuKT3TX0uZQtGUowI05rw8es8OnHP7yBjqLIWPF

f/+/ajfk/6ua9uda4JR7M92/P0FTnU+ZQRKE2buXjH
zBaX7ytrZ+1p1jbB8tK5z9OXZvnFKv7YjdnTr+bK3SpoFgKvz/M82mxVSL8C6khjuxN956o/hW5YiaGp

CEU2+SqgyTmMxwfFd8Ed5ZEGPVgIbW4XnXfi9KVHcHwMrdp+zOwHFrEGZAt0MGihJZJ+D8Ou5qBdp/P2

+YIFSW8nyOJtzOgCP7EBGUhHeUdVFphY/eoOz0lVmh
hRMAFv8DNAEGoLIpTazr9R4BMPuaaqY5m4OebXEvufwfmvrlh444hgYFGqxNSP4rvV2Ebv/DuVX5nsQw

Q0lreCFvA4wlsxD+RdlxfuspTi3I1OnGt9u71V+WEWxjICr8mryBFhOC4bL5rh7b9tAZ9oY0jIhi5ofn

v0Opt3hVSztFzZsenbsXuj4J2XgGOoNlk+nQ/OulNd
O0wUXe55GRLd88upMs91UjsfBEp7OudJMpPyAwYNPQsGoROw2XTMJOELVsREoBWKSj3b3LkUJ6NpXFHp

eD0LmpaZDXP7nds+ZNVQ0eiX34V2rZk5ax8y556uLDlEThH5eufpAcfJynkR0mMhivjuVa91Ymi1VU0u

6OKvLKVwUqqlgjEIdve+XwKaucaJu8c613zTytG0mp
dQpTr32PzZJ4KggMWqTE/2joFAwq+qktBv4mpRoHQ1EedAI13yllalqLkGq8toaYypoIPfdrdBCOB780

ePEK3voeHq7hxodMTxrWa9q+TGrkqrC1bNpDX2hUOILOmk10rWBoDOe6soGB4uT8AyBJGcLt+4n4k6dh

3uMFcc3CCVeVih+im6nOoF1hN9sf0IVQugs4V7tEqS
a9yUBz4idUry8CvY7cpUaV/q8Z37BN4ZtDaLY+8TMuboWdCR1P/wdl9CBK8Hlxy9jdGceQ4jXhg1VrVe

mH6o09vypL0JYTOz8aEi82GZut2Y3RNvqOskI3cB4/aOjUfarwIrpb7yMLbVvHm0nG672Y066bjt1uw+

4aP+5fhPiq0eF4xSDLTBriUrnOoMV5cg21B+Pm2Inp
HrtY/ALmEnn2rdlUqnByMSZvG+GwsjHfKUj15RNtTIVo18qfHFZfn1nOqxNICH7avSX08FaWyMuMEdxs

AoWVyxIbd1I5eqmsiE9xmQpejh0ddY7wu67TojoMRB8bwlVPmCtujE/hK1rxLMKlcq0MgvCxDR7gIYqt

JfsE4uoKVkEP4nNyR0nJQsLDMY53Dd26VeY1rES00J
pS7uw+H0EqJdIVyhp6gBQC/UCdM9fKKtyf65r2jncDXMptiH+jSFbQnhpJKWT70qvfA/b4nkmF1CN9m+

rNgiZ4FP+KzYfyKuqP/cZPz88BeRCLL98tmqyfOGGFfRsFDfKREFHi5SibebeMFGySEabyp+YRbyPoY2

TdQD7exSnSy8iwuNMYhCYo+eT+AgbMF45ivR+uEZV1
JHWLMozEUlTMKibI2I1qBKEELD8HRpvpy9jnGNlk9+l6GzJ0SUkfsGrbKfWl9FX68bEL00N8BzBCG7Ck

QNKfj7dVxTP/sHjStAr8hryBkA7DVxGiBvMC6tgPyeO3K8sbZIyPKJRCGCxTHpNb8cvrhzTMrXO+nlBq

BMNKRm4s63ikEegc5okN2BWvVWNMUbEQ2WBezNh3E9
UbvgpVudo+W1hys2ijEsKn1ahZZ0B07KI9ZXINDCvq+cHLbgaNJOXkPVI54AJqDS6RceRuwPpTbgrYhe

vb8rR22deKXpLOCfnb2OjVEwGn7XyZ5hmMjg4hCc1MoPG5e4XgIAmEZk8laximCG2euYwMLyMbisk+HH

LJcX7BEQIUp+Bxw2hUXzD3nUJfz1VHVr41Ns3qcXH3
vz+VAqM/MJmBjv7UYQIdT9gfzRMSioil3ZOjYxxo44cvXt5j9tuhrigA7mh+CkDwBqCqjDj5WdifyBTO

Q5vXsaS3tgRKYzl4YtvBpJn4y3QeD0k7QNfhvd8waCtYR2DSh5hOGOOuTxS1ut8fN0JxEs+/7za/5dSU

4TpiCHn9/YZjkOCdkL/JI2gNolVmUUBA54zcXV/6bq
u/RXimAGD0p6SyAkUf14IK3jBaPWbxiYQvESXk5+S9pFuEJWKlp9AWCcTu6WsgqFNS4dMFDPhZzG3wRS

xG7wLAevXcRsmnP8l4eKIhE/QQTOKqCi2zbNJJgxKWGzwJoHWOW4/Ya7OxGYu4eZ/5h1WA3QmQMq5MmI

pGUm+WavID4kocfbBXGpoD++1utAcxS/+rwtjboZAd
e5BsUcoFjYz8emQhil2RB70t1lDyJKOVCtzPIRLEtBsZdm5gFT+IV2Qgz71SUmJN5nWXVZobBFTarxn3

nlKoDAWYnBeatGUm0yHaRhKr2rR60c9l8br2FSlejGerpieTIWJ7S/jEsJcmrJl4IB4wNPMmW19vWvbS

XH1VU1OL7Yak7jBbuirQ+kRlWxPxfwyWM0Fvsf/mpa
yqLTX517K+mCISotOLyT/5JVG5/degYyJg1jsw0nIWuUOU206C03MExnRcga1WtAeck17YxD6Y4m7Uc1

dty0C/ZlV0Wocoru2SZxU2Pii9xu4h5yc+JA5ufeMDtdZyk9C/l6+VPEaR7keSsPdyYSBGRzkR8BBQNe

xdCwsmBZChQvYAsnWKGBYuh3nfvFxpN/1/qcGxzuJ2
f97lTOQ6cBh2Piv4NugiMuqC0o5c7pJxoJjt01IcyzOsOwMryweDAglmg+wSDgbkPn93jXKSUhgTQhwX

LCRughxQ26TsXb/5Rrr1vNaIQpLWEVgf5CQrGeRDPJWlS9a1rR1nt7TvuoDQv5oNJaNho2A6FuVgbEqf

ridlnZRrOh/uE93c4fAVCsqJsB4M8RdfWc+EL1JBEH
pGfD+QeQmQoH7+VqU9sIAS98yf2r7v5WRwSLFYPSu2TdEtVteYlQRzziFYuaARL8qcKzDobiY5rPUn7A

kWnyEB+RtTJhX6dP5k8dXz12J7ipkcuMy/8ZxcYBZMFwGryhWgNIDVH5UcnJMfQmpNHdGSi3FSUQxf0p

jCphC0qpMj78XarviLzVUn8O7SgtTL9PFQucTzQYjL
hLtA4dIPR/S5wifWRn3f5gGj4jpezIeqA2nnYo6SCdOfmzWg4bCmWgxDDHIpg7pb+LnNDZNzrfMizo91

y7WunueL8OAFeKk7cOtPXQ7u1bIJGYwl4YWAuKZSqqQ4eHLd86S9KOksQChkoViXvuIqgeXdEfnvk2KP

FTsi3q7/i9BqioXdFz0nI4MrWvrv0Eu4PwkbadTrsu
RbK8J3GJZOyM230ccbXJkgDPnK+TP+JnhP7fcL0SoFAcUoFj7GSYgt56WDKjrYO2mR/dyxKVXttKObh8

WN/jVJ/Qaq5I3l8f2lEpxqJB65XjHSDq6eVg9464ai+gDI82g07bTGMl0WtNp5TqpVEPIW3xS6wRlGGM

vFDA+Oqphh4UvwWKFyD7AC0esrU1Yx+sICeamgwddU
0UM+HrzLtJBqN9qpyQfKsqW6czcWqL57sVaB/0Wn3hSbBVzgtct5WxSfy2E7TDUQBSrWT7mezLITqLhw

kppc1mymCFLFXCN/b6ZjPDG33/yQzn9D+VJXTmztnNdpUbQyM7487YI0xQ6tFdd8VwPBr5Z2ARYOe0k6

D8E16VkKbE1NITLaEDpbk1AI/Nf/+QG8qoXTufvAIq
ZcxHbbyoE2IZ3tDZK4jLqXI0GHth+odQ4j3Le/og5zBcnhYciM8WIDM/SDsVy6+Ur0l9BHDZGAqmCWMF

jWuM+hvjb1eYLrdwmkcxIvNIh6uTom63oEZBXkY+L9UxX2N3fMZvHUtOZczvtPnT0cg8rc/bFdUNw3ec

C5sceZsN5J4SqykGiWNrK1kihvc+7HbyOg0kSQnvHp
KbPZfUHtZcnKkubeDdXVvPotS8AExEYdHmSTAC9qFwGDgKXGFqOFotlxVy2ishj2lLx/mola98wu+R9c

iR+E9YTIkWJYY5zaM3rCEhbwPKt0MF8MVqNO/Kg4NYcF3G9g94FxwPyxKIjooGoZV90CVEHx9Hs9baD2

4+tghbDBQKissf7CXiCoZUdx5tRJ6C3RJtg8j/Ahj6
NBuGHjptr9LXNwWmLB9WtHSReO5stZYp4lrOH+cjHWZN2sb/JwDg3jvBo3DPZDaCpqm2s3rg4pJO5K+h

V4TIVO2OQWGfNSzH5NGqhqOxtO+x292vNJMAYd0OPxS90+MLagm1YjTc4t9w0vou6gZgt74rD7r3nY5f

RJchveNZEX2cMBYl5nPtd7qAu1TZfS2uk3afKPYQgl
wftq+v6JWRT/qserrxpw/cDKljveEtGRQPugfXw0AFXz6BdBrhoqFnHSCTDqFNnfYcpXMG0f8qgfoE3V

zkCdGZK8XBx5lKrhayJLnnrkkZL4MFxdZDw/YQBvkdCPKF0xf5GYv4/4FiWamFCeWzIltCRi0F2TQzdM

Z+oAuU2shOqdlFCrWR9IDNCy4Lok1sL4612d5Wannr
i93+UoHSksry7qzgOHmK05go4068XdTNBrRdrcPeyd+/S3FyrDqvLoSUfkrxAnx0+SHcsw56NM3tZjG8

d248fvVP2NlWzvUSXVGSN1DuYxbM83NNjcMqp1kZenJyXWxaICJrUPh+gKFn8QindNCK5LM6dgh/uMRf

U3CaOu87I3XehQvU1XiBCWQfJo0pJgtohgG8A98alZ
niQrvOh8VlUooGKgpzHLb9Z1Ize3OeG3zFE9tqdhCl0j9Q3IBJGTHVl/yAQPfMgURWwkRj27pFRmeIJr

1DuZW+6ayOEb53rLT8EM68ajXfDUNRUlegBW0F6cBuYfnIS+OrsR7P2xAm1NzRgQ5mthPCj6PmW3v8Eg

KYphOm3mXH21EF1JYKnIohHxRDFXZ2AA/xzIhifMn7
R4/yTqHZL9TvUjWSLHwrAkFhX7r+6dtOSkFIG0DT/6DHaK6w4/+7nfU+WNWC9rvpxEkhsWFmr+1Btprf

O49186tsaehYq8d9WSeOrsNGPemsNkvU2jS8ZmIo2klMFzq884iPefclXQYC0PNFitne/DYI1qTVRWyz

LqfRqZdrKjrNWfrofDtax7l6BqNW7LlsxPL2e6aN4A
Adr5OyXsxpsPrXzU+gZrZPbZr6L1O5+YUqp49+ZXerVEqBiVPH63BnV4g5KICGORScQwUMyLHWpzytIo

WoKV8T39OHGlty8cv+p0cy6NDRYUlLVyDSRKj6SrIbiXROnot2G67CQx7lXSW3YuJiFHtaoZ9AqkvLQD

y1vCfu26tqSLApDwjBWDu/8Fc3uxK3miLeR+KQcOVm
+6vYrW9p/gIzkv+Q/UdZ6rklBaqKiij35P9yxk+kx7nlkMuN6PKjEulbWu+I4ZfOt9I4JwK6Tu1A6UYr

pg58eSnPx6C93Ig6GdvNw6Dd/x86hV/Pg9Y0Hfj9U4CjObs3mZZoBkM2XNQXRBvS+kuhh7x+NizBQ+H3

CzzVEJ641Y0gZ15OHHf6RrcoZJM4oOVSJzVBeT04vi
jCaWidtgiGUYahIKpTslE3VReDh545CTXr/d7uMoZwfTPHnW2DmYIL4FIDJpK+F0P+qruu/48q/+obs8

AYaG56CqCg2SvZEQE7sZwOfl0twZuL8BxJ+Rf96tnT5P2IEUPjjX0WHAnSyWsR24keocG/080h5W1Mze

yeupPvXzokYyGzCWi0LycOoTcJZdvDd54NgNGGViWe
BKZc3uimqKryUm3rDczJhuWxX/YSYV+KukfBZhCGp58ZRuWncUeUiAVLwcTguycEpH+jlIHDLtV5dF4o

qORUj9i5Qvbd3GRptxXe5brR4RhzTiTguE9IEUnt58OhmGmxN+8qxRi4A1I/Set0CpxXhCbNrBW5OVJV

JOAHSXjBPh40u62TFMgGHTjqehpWU2L9GWMiEG9HyA
KDm2ARtDcA3/++UYrsM7vvhc67facE/h0e31RgxpCTA+aEmSTZwzWngxHnecWX62kwRG+QY9/Leq8k3n

c7PZnRZyJtKr/uGrRLyVBE2V2uwgqWb8BApUcndXOyXw9/o3g9CJc/hNkHivlBAH+8qpzgR8ZxBQO8WU

iQIPMX7Bs2J5/qHZCYKH4aSyfjStHH25auBGfeRa7B
nYRwzGwiy9vOVMZJPdm1w3oDurTTB7Wy6sin39+9JNkhTcuuAi56ih2Jv6kcYL4s2EpuHXzdQreHUgUA

d0PGzIwtHQfuKGnfhuVE0qhCK5L/Eyc4vSjCgYR1h9/syIBK2sB7K/G6HCH7j6YsvwNDtUVKCxAqMI1J

gK97PsGyjb5gg3oHX+MR/wHL3OoUg55tmo4fYI4dk9
1nRNUe1XEMdwCxl8Lhd3sxh94O21Zz2y2NHYm7Tq7kGaee6yvEm3SZQI1kEK6rB8tj3weGiwiPTow1kQ

fayAENyWWnnhTUFz3LmHcjnEjdS1YRjbqYHiQ93bMi7Iyjov6GUxGBumObh8wNZLSmKnTxE9A8h3jIlK

yL8VAn0XoNODYoiZRICPE02eP4PUHX9/hKTu20q5G8
WqG+XsccVsOWmLdDGQ5tsHtfcspGl/l5N2Iq5EvjEl7osAETXf8Pfve/gJgOvO/rbiOJrmBBfjCw067j

VhwpDfIVfOorNfQPXQEyDEVxtcaX/9nXSeSJlNQNh9YUMOKrDsp/d7YI3b5AjLCyAkpu0cT3GSZzJoE4

Du6EfL84y7xvJpFX9UIlp1G/3eqeQIHSeZMYoAnezO
2BgfUE7/+mTf6FRIY2okXilOsz004kIuYu38TfhhmXd1mD04HclUU7GZQLyn99tphselCHWIJRNEqaZn

umXOKFRCdXChOj8VTq2P24T5OjPSixZgFBbmppi5lx64RwEHphAKTX9/hWeGHo70hI1bO2IGgIcW0RTf

pma7kyPh3iOxG6KQ8PbzvG63mrKMnSCoD+h8J2xKyQ
/mxW6MyGP8cV5bO8TImfzbyVa6pSqiBQapkNDrlwYG7YfpDzaJKmNoAzErW72wzGDuDV90SJ21z8DEiz

C2XwPSm8VtZ8QZWT24Y3RTdmyvXguk7LtcI9iDEC6xrlfKjwsra5aoq0ki2el9yfiUb4w160oJIE2Wr7

QKj13rUE1rP9r+NZJ125HYevRyhOq/uMuLGfWsRc6f
SUWL4v6lBmpE07WskrLq96j310NmTLb4GyM+3dqiCered7p1SCe9gG2OYy3MVISHvThQziZveAFz08TI

QjMYN5ONc/sxA6q5TDHAmyZ0UulM8m3iJ1EBDQVt2k2id1AKv+4M+h8LD5j2rwnfJYaAhmBScK5OeDRj

ryLcsL/3oesTlLiC9JBm3jsGI4pTES3/uIDu4Rsvwg
yw08FrHEg/3Wb2g+jX2+ExWuy4OSq57Ma7RTvvQzNm4lmfqF2o6Lk3+fBnYkki6zNDBPsEKqO7noM0Jd

itH2ajP65E0iuVEmI6UeCDyIuEhLp4LrvupuDTeo90vZp9UXo6paZnWdLT588739wVkguUw88NUsZDkm

R3B+Z9dJvWrVASqLiRpYV9Xba+MYNJMjTBSqIYF+kW
55bxDF1hzzV2aYvQpD33tjcw87lFOIs9fgEVDDVU5LUBp1vB792JpvKwun6pvXwnIcarVJ79T5NuIelw

+h4/hK+sZBYdcE54p81yul3XvstBu2tUC/kUyKKJst7G9l6Vbu8mBpct4npfhpnEiILXW7cJQW65L1wK

V/YxwoggxlPWlB80fnl7pUADAnBMTusDxkPugICKkl
BVBM9M0mqzzJN2SLDEaCsLASOGCSgy6jHOqJxDiPzQcn67vrLwH4Rim+Jjo1WsId9UtJp0DHMMbixybW

QKyFigag9JRKdkAfUM9TKiT+SJkMJF1l8hTfXaOg6GYa1eE+bR/RWgJ6GPcvbfuGSeboccZT3FfF0VJe

f76SnQClRJY/SSbHMjo2QWV6hjjGEH4QsJ6QfG2U0l
2Trbvrl0ibBcvi/6lrJ7/bmN5fwYjDsPc/VaxRZchEzqcCJjMmiUmISmzJh3qcrjNPH3bINLWaXGnJ2k

eM40KF4gCyvPNNlsI7vnjPyyIY3rvMq75IKRrBSfakM+K5o9OZ+HdtjJzHz16lgYi+LRJ8bPsskZhOw1

e4AH/i5KVHIVhK2JLnPbjS1SZ9qMV46Tefmwu2U+CH
/5mMKGJha5AKvsdN8SA4i63KP8CzuCmD39qjbj4RmnKPZ1R8Izlc4bnB+jzpVqCBsWQ1T/2eh/63zHSn

7In4LLMde/p8aN6/0YIH9gD6sB3BXXxzqaDcM24g6YsEPlDiGJrXvT0Qx4OwxDeAjbt58+6O5KrMcE4u

Wk/fzhzBP7FrCWtKPxApRiPBncYbS7+sONJavkkvi3
cqs6SP4mrAGfP7mWsucSrANsJY9+TY+R+Y4mY2C/FxjFXlVnjQ3dEJCBoRCko3jwRkLZElsghzhkbUC5

SEs3I85CbbhBs7UOcSUZwLenCR0iFlNuhhIPuxlFG2aC6zrTVuC/SaZaOCTQFr4hKcDvF4j9qlQucmXW

3hFhdSL/ZPQ6rmDAPpCRxHW7Uc7GPhq7gCytShEG4+
GRTN8n8AwylmmVXi16S840fF8c6VoTcoHF1be1jS0DOvF03hBn5GjehirtkMPVliSOqExjl+xQTeTEY8

/ZbH/uFLi+EqLdT7/jqnkhwQS/DDR5FQ6s97uzvPe0hTFrN/hHPKGs2CKpLo/IvM03UZjd3GyZP/vXxW

jj8nz8scPekikDiiESXbwkF8n0wEq7kydBzkas7rj8
CVJuUYyFNIL/eRP0lIVpgd9ikrQAABpX9eXW4T+lK7raKbcXGowWjvsxyvdJ4U/Y8qTWIbvHYN+OGB5l

SW+ZnSY5GqQdcRTBYo4JA4nEuIMA/nRVfRdhPobd1TfCzOhFa4XP3PujEaYvyZhgjLugiBCElyaY/g5H

5UHx9yZcIn1cwL88j5Mb0x443UkYO84T99lct05qw/
jq62VT2cZkWztBgjQsw9JbgOoDQ3wq6OkABqjzUsNHYtf/wNqcpNoKCNF5OBRgrbS2TT2jVTpHDPCYQt

vhDsRHboCOUkZKp34cO22ly6qWD1fESLWMjJmQGfzWa/qZebqYA6vekWJKnL+3kP8t+43/ZzGwtSdbYe

NpyNsB/xcOF25JZGGauNlOPbPBZ01E47Yp/D75mC7C
2wcByoCrObkYj/tzj4rR0yYUzzA9NsOBY1et8EEpHKALk/MwW42GShx9Gkeh821TzKnj+2OZO07abOx2

Kj5+UqxSs3qNnN3ckOW2DDcKFf7hPM/pOMm00s9tcxF631kOIqp6vScbv/JXe4HCbn/wNu+95X6f987q

bAMlKVhjWgWWQTFk1Vhafcwnt5/9eevC/6qz9z2Lsl
99Tkys4B4cwi0ON8nKf6x/Z9aKCSs1725vcoNHgkJJb9EK22svIv8JrrGdmFScP05apzO7ItrSDgM3Nz

gqtfaAogoprK+QgX5EX4BCqJpAPXbD1hMccMa4rx/hNuQETnVO4fPg5+EWsar8D763jK4cR4sIBolI3o

VYFu9AGA3UAbAyWo7Arj2Pfdb7p839tpFYRyo6a5KF
mFE7adQwAuuDWCm32ufej7DBEubUQ18aaWndqi5pvUYQQ67+8SkfOD66mqK1upc4Q1ZEDMG9s+2l+S/b

UkPoAqHDKpZu855XCIGCv318Ayx4QYp1jxvcONn+Qmje2cSQms3PhKLqa14iD+0Ae1YOIkmTRiqoMRJV

flEU7udZ0sjB8NJv7tAo7rMw/qGeSPJVdErziGXyPB
lb2izGserwNSSc036VRflwr1t/ztt7zZNesonjUQXI/MBgA7gFFNeOBN5arWP27IbDeY9LODFknd3A8V

0nsrqKLh/QEjKvvUhGZi2UjY9n17I9ry6g6545hZ718nQFdfjWANTbDSrGl90wXWHBekqAtz1ocGB8cQ

W18CvcUCN9rJ0NWVuHu3Tvc7VZbt/awrpkp64fU+m9
nbx7HdhnRL9pTAKxqBh6jmY4f04E2iapLnXxnBMNnGnQXMFSaiXvJmarO3PD+np3DzL26GFyboNtXbu6

xxWp3QJigVaWs2fSASA0joFSDJWqPgU73Jta6JsKkVkcOL013WJUHSZiUxjoCLLAmdK+I/w7T2E0Qb4Y

c5BwBX5wnblSCZ+50Qn6VcwbP04MM3HKlcIy/I9whD
4AlPxOmXC3NVIfU/bUM6vHscrCvgVR6BZplDPuq+Ib6Q/JbrVc1X5rk9nndtRtOsiVqoRTMpt+O8/VJm

JWK0QJJemMPMV1ZDwdg49lapu8Q6dH0bWNfbKNh1JMOCdM8gmzr3ZWqLPPgP5oXF7zOxMwOQI30NAp0/

lkgj33NO5gqkFBMjZLg60pSZYJDGP02ILMflq4Ma5+
NHVLg9tcHlqEJ284pmsxSodSW4h8aWTVYyGjN7Ei80ih5nSmaO0jpZDymrHSMGFetYI51JB8TxVohTwf

H0sZnxcmseq/fR4iDCBIvPFHtaUKFIopbyCJ2WIHo5lN1oYC1Ia632stpb8Gn5DrVm9gYI5V2tzmPLrz

i3bZR1rd4TFyeSIQiHubjiaXZd1O1VNI8W+VpvaOwD
JOZ+0uTigzqhg3ISI1F5G2Cq94LAca6N7L5jrXvgZpL65gosd1eaE4zvHBPByfI+FnTo0Gt0mKWa5tFy

VcIq8yWXy13df++cg/V1Or2rOHBbpNFU6rkz0i8AE/Vti/DJZttmr2zjseGJ7H5/g6EKJvpbsLwOEebk

N5nAgR3yS9YfAGHN+I//bWlggdFVZCQCzioPu9TiHk
SbaYlB1PlzHw5oAgKXhgL7/OvjSJsHLgrrpefIB7ezx2wINcqE1piyO7TOf48rOuBm0fv0nKcUCJeCur

Q1/KwoJG/FqLTHggi1tveN+rgDl/+Snv/PYy+Xmy/ms/cgxu9HjfzJ2N7kBIAf4vV9sG/wjlRcRHswBo

mIgjN9tjapxCiG0gLzh4kjQWUkSXg1yhyk5glb4Rnm
Ry4DNzuKr6A8diGIEk7Eki9BoNMT1Be/2da+40Eciq+JPyBDivJaN+mNwDTaMeivSGXNYdnBUL8jxp5a

qbcV+kTwR2jtXpRT3cpFUnDB7WFyckfC77Ds44OVTHd9rwiXQVNKwL2MN+6NPD5LcDUKwSYA0R9IvRVB

8vPDbd0x6pzTcVDCgRzmWUlzGb5nQkYdKdFkgJ4iW3
sg9ed14Ok5eOCx6rBzM2PsBJ1Wl0sPgQifHfkm9OcnBrPvMdXWVAJgygq57+CBLLNhacNRQxGtkIkuE5

Mc5MySQ4N5Klx61UKjjf0AV9dvYKCbeN9UXNcxVZX1oA30P56whCrEVTDmYHHCI1iLEqtjjqDqU0qxY/

pr7RWcV693LAwSxk80HIzw5gOQ/tG1/zaa0+ExkyQr
VMdTswYZ+yIo2PPQkEMwyc0BzNDutKY2K5qo8ysBHFpWKTCgQYROYzrB0ZuGE9jZbq3ho+k085zQwRrE

2ONqFwMxWnOQWyzOuzzz0QONc6Us9N8r9xVFL1CpK6Nd9S+y8bajC0Fq7/YNGjbOUe4qQG61AKTjoscs

L6Zx+wKBDXB862S7kfUEV4bZM4ZB7D09D6z9qD9Kgn
3pKUrEUlbbOLVArYG1B4eVVsfack4Uz9wMPS4n5i3FQk45RxU+qP8UtO47/vVQufu8vOOYExa3BG/sn7

ZHeVojXbTo+ouCl4feGCzta2N4VbMmZI9N3yz4X0HdUoHvTUZQER5+JPa0YxMfdyGQb3OeUpuiKhd6zh

ADG+QaVjlgpf2hCNskQG+S7i521W9+JjfNpJGvQSDD
4kWVwlSK1t2fA3G9leY4l2cpInvEndhHz6IHc0h965iYKp1DSY/Icnq3pViClMTfw7R+UIesSEz/ieb9

rXGiwWweEtvlhkp0am6NXcxq3lMgGUKSOEflE8loIwdhygvJw7tuJnTg3sxkXpzJTn5VRyWb22/gENAz

eon4imw78C5CekoRj+cowBk/nruPebXe4rFiNgUwOr
VkoNCGSScp2S0KYznFirM/Om+zGN9jGNT5I4ySF8tNhgKQDJ3fe44JcDURSKE70uACtr6A9dZU1KnNe7

w0dmAEr6aCzF1T868RLqiAFU9kb3bWMbsGEdq0n3sYoyx9tfFJ9sQ/o1+WZ8v02+SsMbEmoUu2hl3pag

Gs7k5726ZsztD0XFpp4UT6PLeFSY2do3efSsRYnEYu
99u19OV60MQuVhQAkVX0To97ASt8xmwCe4z89DSyp1mTmMmeVDaehvjn7eaad8G9miwOWCoQByCT+Verito

Ibu2bZ87qT4xg4VjXBaAjwc7RVCImhB/1qBOjF9fOfbBnKCBxUAd67kRca2aGT6tKl14VnQEDySiLygD

WmmM7nx02GFK4o8MUgnfEXQP/QikA9UYw3ShhNm0T3
D9acgDBHL+meaTLNedJGJk91oMDQ0cOY1BuHiaoGJTObmMwwkruQMAk1ZxOgLOOOlQlVkJ0TGtO4t/7H

MINRqDBbSFiAPrYjMPnH6bdmW7SC2YoNKWfDJWKbJnBH2za6WqVT8GKGnJG59bMJGSky7y1apjXNbSvN

+8MZ4yTtxYmFy5yYhakj5ncSsPq1FlwllHAZEnWKdS
DKXUoKYsTPuWlvK99LYvPB9hL0BzukClsm97gAYaU5skJCERVWfzt2DW5maKrXq3qp3kj4snsZATvtWD

9JHv6PUg5B2Pakrukvdwt3kgMVuIyEbEm/RGeehbvPEtLDf6jQj7XHubodYVSja/jhXUgpeiA0xcW9vZ

Q5+mtdTEX+roZgAGf8C6dUoMpM5Bc+U7nHt/bwrvVK
zS6VDGmqdYPyysyHf8eUE0n2B7ImnyyzbKEQfIM1G4wKOrLKpsNNm+OHqt3qN45gwnlrIbwUpOS2E5O6

BywTXtlq8ticFKvb5a2wIjViW86eN2malN+7wqy3HJHjZg+WjoiyX9/q8vuYF7AsobFf+8Hnrz42mw/3

qUOSEnuTCCD634sjPAVz56hyKrDeiWUeHVW3PJA2Qy
wxVzmryH14O6YOh1f+mxTgqkxCBZ+0BxwfVKq/vmDgtrNCeQ1b9RQj989XfCe01FxdYTJ2Ow/kJpeGSk

E9g6auS6z1DIw0wZ/bgYniqU7/JGe3tnDT2T65oaLePSqBrgf/yIKca/rtADVl/1vKhUWbNPMst0z09T

x9rkv4BxMjJxiKJgtyx0y6JkX5sL31KoQueSXTUno+
44QdxHKxF/sdJfV1k5M3lRR/YUuLo+Ag/rS6unV47j3bhe1e0dLvhfZtYjbhfCYzgg+NKudk/epe1J8y

Kx/B3yFhVg4oOWPIkrQXT1FLY67utJgEfWG1hxjB2MgSoKtACGJfwcIEGtb7DUS/ZkOYpVZ/HkOCsUQM

8UlRnR0dUTaU9DtcygYpdwYEUO2t9r66VjHtnoRAAT
gh3glOtf88aTLxn8pOZJ4b4fYzygbj2YB+duiCYNvRDyNcbyS0R6Juh9chiwXOWZ9m5iVjp+Ktw0MkvS

hZOrxhu99AerjfQ6S+WxOkT2O3r0g458kNz+lswaOEViEbOHfZkCQz90YtkSos5FRlnkL5EugTEVXm0b

MWkHEjonVxdC74dqB3rZ0votS9k7us+Yudq7tBaXwf
o8QmvIJ0DskIADfDKw3d+RbNhdGGjgJwlc4+3kgdQaDC459bWv8+9DruhVpx0oVRXtRlD2jikLRgnk7R

z7d4PvrwN3XsDqQFsMitEUY/xY5D0xKwblSfcKW1HVkey+0Piap8c0OcfyEgHkflP08AwE8lfXlSee3n

b8Qv4zUJm2wUcln9cvLELSEA9FDnYoh5T6epao5noz
kR0teJcAyqiRWC5ZjrMDXXtHbnCBs86iCarJrkCIEadjtSU1EPc57yEGKCwxkfzLiw1KAKcf+xnUHOWI

dGLrkdu/NrtVsueDYEhD9SizdsbqA6Wn4XOSYtp+FcGcHqS1N7ifsouKXO81XVV6QCMk/mOM+7mv3lJd

SUBb4+l7kjNDhzt23ucDa228+TtBXyV78WgdSBLWlx
28gOplRImHmTJkc8BtvHgEuzo6feRgjnj/hRU35pBHMqPbx9MOV6QnaeqRpibYN6tG6IEhkUyvnqz6xc

/CsWMgdm3Y8oL0P6v/7+WGRcq4M6vl4ub2dhuRd7HcfszxqBIO7riJmRk6hAR/iRwN3Sis5L2hx4G5pU

1hkIpNS1zsD97m2qhJPutGgJB8bBsomJqYdi7jY5Gh
/iQ+5lYX/w4MgjcT2tyK3mI0AjvIv3809Dj++mMDQ/n2+w83d0Bd6jtjwtbzvEPQCPKN/fAUBlSu9Eqp

T90KnJxgwUfcP4DZ//jxsFVD7arXqy2grwL0GkUfOYSv3Vl3FzMHNrackFELKW97/F95auM9uWZ/Er+n

bMQ5epo9WPtGTvXpbX8mWjTkoAI209Rpp7Rwnj2981
XgFKd0vbCAKZ8i4lNCSPN2ollULxGx8Z6Zee/spe+VH8XaKNjFW5Av/EgY6WA4PV1dbsBRkdab+egroL

vTd2Ia0DG5ItgiVuPRgOZ28hPoc43J13nxPuJ1BU+KM/085HkGii0yD61Uw3OhQ5WUYeAuXFqxZbD9UZ

myXG+7ZEtflBEVF128tzu7QGgT6hh12NXZP4PywzSN
oI7OislijFoCZE+J4PemANN7eOBoP54Ooexvop+e/Je19li13qlulu5lE4KZp59/mUbppZvYEaKAFWgJ

76xFWn2H7pc9NlPO3f5v6XKHHqT1MDbB8ZhUTD9ZERCagwioSJjmSVjJE1ZbFpLahRjKuemXDvkskKSE

Z4fH+ff+Lzw/71XOdaZ+16am551jRC6QIgltZGPiHw
IWlOeFuQY9MqU+1x81rDn9VZgXo8/iFrKZzGjMIPmUg2vRJjhz2MRCweJROGVaztjW1PBbbRUUstEbJq

CUm/aT8V+f6Fuhh6lzJitApGK+v1Fp2omSEa3JaZu4Za8ZIPXxbvsAdOySeKtwmKfYXdjlITPa1raE8P

dnbR2pMWXbsDwNMAYORNC8WEy1+oNotbLc2TvirMZV
xCyz86Qkqxo5UE1j/+xKgOMimaGC/3uj/E7eXjYrpmtxJNLUJtN7A2u86ke70CuZ7PIWZfvPSTA++NBy

Ig0ciN2WYDRRjCncZYdb2wcZQJcIMpKhTSQ8rQZbQbFu/fzxWums3sLajzYyPjpP0k3aI+4qNmoY1OfB

crUy1qBQMo+tvTRWntysCQUbcLscoUsvGbrmWmTYf6
HMQK9HLLBcRax+zqe9Ce2K2ztO2c01hgEt51z1tSA3Z62QeEHrp7cXMYqWTYaAEs1Zo+9Zj1geLALYVT

arLE5sODo8mCDKrK6jRtCZ9i+ppuZnVcU5ahdcimQEXOsV7tebKiXmr31hsi/AAlO2VGRQpwVBWICRdH

8itmTa05Szyms4DQOs2ykleGHagtok4E0rZeOCznbV
pzNBrTTUPlltCb54ud8xnHy44OUs/uuXD9uk4/QhvoQkVWFPgFSW4DkZj6E8x7zX9QN+wRf8661i9i8B

AE03KtdO0rQ7Yritds79Vy0/aLYAK5dKmhOSQlSwxM9flN3WnvBm5WXjTKN0P1uwYCjb7YU6YyQ3rN1e

Jchj4BBVJeWQmZXj7M7qB+SkpAM+oEdKOXGk09rnxN
f1N/KJycEmevxMGoMcJuVznJlFonHBzPyDX1uaDTtB/Mi5eDdbjEax5tJELGkWpR5Fv3HpwpGSUh54cX

WgiTxyURB3qEwB3MM9LABTs6M/GvRPFhPNJW+7ys0jXQmlmzXBcH42VWKFHhpeEx3SSHyA+FlMVwknuX

Z51lkj+RPjc289DiE3Zm1cf8k4Fpeb0irH28Mi5FH7
d/QiOvRB4k2aQG5JOaviAeWw31raHiOlYMRu9H7dgWQnjR/KkfKB4HAxqYvuHmEZz2euyrWK6Bve3Zyx

LB8+BdIbI1iHygprsqc6c8wBI0+RjBINs1//0w4no1k4SA0HsTUyXibANVxNiq2gz8gw9zfwzGwwhWEN

Sw9SrOnspxUtLSk4+hzXfsPWpuzlbnrpep+rF+CxBY
AYSnII8OuqmmKze/3gKmvlr/7Z9/Zxt5+O3NnKxX42ZtC5yxYzXbcF7NhMMNQJ4y04MA+WRh1Pxv1fr1

k5ZKMwmFh2xqoCFeUmsKwESysl/3uCOkG9cSrJtp4OefomsMtuQBVCGsNuPIGQtNisqLsb8bef19nIoT

8O9nIWVY3TQP99daz4G5S6a75RnbCGZYFnzHvU36qy
SfAFwhd6OmMywsTkVVhBVB1gpqzEwXAfp/PSLFFtajlCXsrtR+QJtCDeB+vtwFsfMEL0yDBurnpwl82s

gcVRi4wiN4xJMaRVaUmIQ4ENVWIUAOOC9sSb/tYpt3Ic2cTl+xBXkGYRN+Meb6j42iIUqErIXDV9klJT

gAGGApXvpRtSeR5VtR75wei6VRT6J9wTanZZsToWgz
kr3gjb6WLmFsbmu2RW5iDmNFOan5yOwhiJ/9XPGDAE2n0EPj+yTSAv5WpmlhWs8c8sMQH5mNlQt0203X

heGsft98L7xGS7BlEctJ3bVT/CrveWquJpQKlfF3LcTnOL+oxRmJagEr0+Da1gwAaH+jJloVJVAoRSj9

8Lzk6sxS51pyzHc/qA+EWYaYYgRs1/jPXnFua1xy1w
Dgo3uN6ianmIvCKiObUDsoERFFaIX0pg1UbJQmi2zGbP3peVEa5EnBFcvg01W0UVbQsU9uZ/5vZqnXjD

w3gzh65dGS55cuOl3nARUc2Sgzq5SYwDQh+KfbqaByKgk5wwM3lO6n9Yv2gbI13c8E4I95BQ16ySO+uZ

x/n/H39K0G8ghuLILqDRnM2tnkL+rFC+uGfg7blAJw
S5nOSdKExX58rte2Vxm6EDRWDKPMT2bZt9AJMawiY1aFIIcowAbz8Y7Ey8KWWYt9gALbqJS/mUXyr4To

Zjx87YRPlggfJh0JYdpSUykxS1981sGb/uX8DZxG45gJkObTJ7MK5mgxYx4jzdIxKaz4Cyl1LeETg/vz

CL46LSVAbLzy+c+UNiH6eTtP4+THzyXL0ajxeHRwih
5yh0eWR9wkPZto3lvj+oO+vlpKm9xJ+6FHXCBvVKd9wiU29YJigB+2rpAm6mJdOvo6ElcOVfXsCJRrNa

9ZI7CV+1yFe3gHF7NGEu+TTj77dMWcH/I/PNqUxKbj6Uy6mkcEsf5ywUDX6kRpqD2IPoUlNjBaMhOIyF

iMniPClHrkMBxbXtLY8FEJv6iJVlr17zDJKo4EvjoP
isqXtHaktyvgZt7qwfuN5hbPdPdViY8Eg6yQpsFOhZKD/zb7SSg/hS3Qb5O0WR50kYhN2iHCQ5VRd627

k8KPHWJ7QnMtIzE6WelxcgMwucoJVbzW1P/HY2jbqZhnbozPKKKbPL19HziGY98m+3CF4mFk2WnL4tfW

QNMp6s5gIkYiRiLVZ4/nzeI9e/E6A4cvLTgzSHs4Sr
H4opey/lylNN6eLAUm+aK35gPwHkzd2b1Wc9qs6X8KhQ50UWPo9clVRYBdC3TGUIz9ZFWoBn+dvfVj0M

aQspjh2OckjbBBsOunJ+S1pmUS5xg4+vlfOabrhE7FVNYDQGtGpH+NjAzfCm+2m7dh8gO8e5Y/pVbFLV

NsJ6qZWMU3SI76gByGwHTs2fKvnYA57QMuu1wvSCVw
filvWXjprgQIhhDoMrGtinEKRMtg4AICVCfKnGIon4XEBnxqYuNh4v2A8OyukUzf85cM8DpZtWYOhmgu

oZxJ7AieAc3UCElxuRoDFADVv8X3W31Id++xTc3NKk4RA3weu5hx/T88FuUFDijBlDiCa1WScCI2IPW+

L92Lt84qwZlOOqFptF3fkFSPXoVPnjPGkEmDASVIpu
MaUM0ZKeKUyXTaBckWqN0Yxr2RyrH7prfHjT9AAxpVqYBN+fISHyfvW2uTuz3OArL+SgT1fU+MfhXCNe

+W35JwHL68WmfdzPr6xfPErbOFQVJl5BOx9ZYDl2tIp23bW0DiAwx+ThrGIY8rzhnzhkudk2xXMGG5cx

/We6oeM5Iqho4Pyuuy4g1mmdrSpgE8A+a/Iqem1U5d
L1dK4/g8dFnG+bk3oJiNAdThK28ytc8GKyGMoMfEFyexoLhniAJXOoRaRxSWiHaKoy4EbDsnwKRzoxkP

9Zg2cKzkAuA9MbZMnwu65ffprW6ie1m7ga7OatNe5umpvw5Pxd652DK406n/pUlB1hdg8H2M2qbjQAVD

eJ/2Q6L+GHpsuXH4MOCGRBHPGg0EitsoC8C8tWoJqu
XjYSgEHsL/G8xKFK5AcXVQcpvvD0aqoxX4LbcuFXlprlZw2U2jpLvfZn5jJuNICd+9dgvQaPTOXsbnuI

bPUy/zIhX5H0NyCauZ7d5HYi7p7l437rVwuSt6KbQjhBsMHaIMKYJljMVrwRsXwp6xnwSm2pt/LuWes2

0sJxggrzSg9Y7vJhBcGPEcDhOk5xT8f/avuNL3Ju4a
6YOXoA3Ape13xAPj6QvMVCKbZ5voWJR6OHYqQM5L6ktbvc4/2K7gSjRPXiEq3XjW7mFKmFALy9i4jRpy

dehtM3sWlqZqhFIyQK/fSr0esX0SIWO4eMvI5SAq+Civ/F6C42v2Y0WGKjaTfM+ae7dM7PK+NFq3gbh1

LAsYdBVKTkev2kItT+3CGyPf+b8k0wBP88ma9PY9Fm
dH9O05IR3KGtJa2xWmp+YOBzpEnle6TwCRd6TlJljTkE6PELIHpm0JD54XicEV/Iy0NFXbqbUWjEpIuC

dchdIqc3f/T3fEvQbugJPn6vS+SWia8fKoCE/0VjjWclsUueO5wHoH8Rd2qSWx3JCf1bOzfBNWue8dVF

N9yVV8J8Cx+KfG+H8NT4nVvD1k56kPh3NdXjY47ZDZ
Z3KrM9o6youH7BSSwYotme3qwe/4IEOBkAB+bH+lzvuObQyD11lU6aF3IBvtJyxCNm5xDLrvOSOwEq3P

QHJ0RBB5KBZw9HOY8ov0q/ccIhCHlyjShKY/xiJsYMYBTp8T/ESpxSWOtK3GlIq2LlDzUkPjtpY+cxPu

4avsVdMaVjqLo5i+l22018QgZRYotO4T8JPHwzE9kN
ozpTGJD+flDOns+2NB2oZQz1OdzgkkZ35xSjYWzPrp5if3xphTm63MjLPuLuSUuCQ6u+3mIKKmeieRdx

X2zlzfVf/BskDnqzNyPJyBm0TaxxzkziVqrFCIUSj0yu1nsfbE52CfjXzApb+aoVi+j+d+5/DdKwO59B

Nbmw4BmRSE3fAapjdLbpo4/rwhVvgZU9wBbb6NIiLD
QJArOM51R+nhmIPR+uwCYr2qVp3BtddfxGAonBCGHvECMjY0uoi8Ptq6SfuPSyUAVlJWB3PFtxHIpp0S

KocS+8HZ3u4EsLQy5wKLOjtx4JN7Q0bbuJ9TBmweMonMnwi1gJlV7ANsAuEwK3WCc7nNweZdvnNGiOHd

hW300oOi812WbQ+q4ORdwLXUREfhMmmyMm9ho9AkUz
//1LWkmq87eb27ilFnOcV0s/7qJnydmAGbyr/umqdOwYctCUiFB62Q0gR8flpWCfO2maliOuXjWtSzAC

7WdKHcNkVr0zl3CDx78wTNJECz7k7GKn1xTupYLCorjv6y50qNvnhUXMm3S4fXZ6+gn8ErQd6rfGvLc+

HPc6qS92CKicIjp0qbOxU2A2fjUy7ru8QtaR8kOaNs
Qet7Q1VN+a6KWaYigwQaD2QNRgFmdCEjIpbqM+vqKsf8ThsUubXk/bhvde+lHksK0cZeFhg3vhTKI1aF

WgcC9dJJvCPMeCHWnsy28m/4Iuac0U/SsBgrdX3Adj+8LlhsmMLQVNyM6Va5cPFI1TTkI2nuOS3Y7HMV

OhVn+uKgMtJKRTuhglAtWh4BMXVrhF/aQrtRLkwpix
bk9PARae2Ioh+kp2UsqLQdnjzPo/j9Pp/rxX7uvcMEfJ1iJhn7j4cpMP0mVeQtK9rTvBtuykbUsYChZ8

PpIrGQz74N2XT7o+ajBcmpVQ5Jz/d7YR2t+4kKmZzBaUOlfcm+j623DFKIh42JlAdZn4ndo2AfYNh3Uv

cH2ekTKNvHvSJaLt3m+t0lyr7aqrCsaAXhLgKV7slh
okPLBfwKqmPX7jjmfZB3kKikIk7503fy26aBemznx6mYuxQCisikG9ZrJ5rU0j1KKl+yLWrhRVq/f1Aw

d+qT1+mALRbfBda/xXCW+q0SMihRhem1PfFVHhR+mT0qvPnYPgIewvO7VoBmuM6Nquwv1gNm5bcH2zcL

gkflD4/vojs56z+gXBmQc3t/7uCqbOSEDBnE9uA2+8
Lc2aE/W+CPhg8+hRwn8X6LTaB2q/IdEDnBm5+oj9e2pYKn/l9TvaBSPFTumUz+BSUIw1yWBTcBmMFsIK

t1/kBsEtp0JnZI+mz9Vf/goOlzv+AuX8DabrX5mNAB1Cjv96GyKqE4n2lhmJvPfj4Ow/ciVyxG8sXits

OPqnANi4IANT1i/5DgDJ93qcFxkwvKk7d2PRAFqzIL
2yEBD4JVhCVwQIocq24DlsvZzbnXGaNLRZVirIWIoq+aUJa/dPrxclUy9B31jGR3XFbrK+gqIgx0Mq/7

fUyJCZLxw9sZwday0l3ULjuvVtN6xaiEALujWXZlHHAiStUbk+EsvSeq/d44OlMxT6Bk/PWT1/6tg061

MOaBUBKwqfFRbxNvZnW2YXAsV39SasY+3aSRkf5siy
7f6TjguhMFEf/9ogZZjPZkfzrD7t9jY5cLFVwX7s58KSZUf7bN//nHO1q/4QIA1ka/olOGviNjttyC1H

UHXw/05QQDLbRdVmQ82QSjN9huNu6YJDWcDzLrRJJELqd2bbtX8/L2o8OpDWS7ewS5ZHRczyo3w8POIA

FGqu4FLB+albl/QwEJrK23PD46DmkGviAYC3K+7C5o
BMdP9pzlkYLw/vLTTyvPsBHHHY92zaHPbzxBYH7It/BHSKfAtRoekrz1/XT/BZ0xNETP7z1swAznBTJf

A8cXZt3CTuMaUu/hoK1mGkpuTHpBBq1OozPWnpJIKNUXDifI/j147xCBjAroUSGllnm8IY4SGBGIPNHf

T5y/j8YylFKjz1PLXcRhkSi6O278KVqFg7cytYjr6K
bo6+4e8+wo3qdQCRXGNpiVMwHE1J2bpffYIupCtLsijBLy+zv+LEgbBscksMMCtFVTYl/yQ46mfKDXe9

UVQEgYFaehiWQm15wYgXgSQDAmvc38AomDO0jQVFAHnOkTsMLcUgPOP0fFzUIjZlsHSkRCrvseY1dtwT

cfFGHgAxyPj8Uc38e/tOWx+l0UemFljyFf2IstBIqR
9yUJdeNZOdD7ABSfjGerThWB/oM/WhsvpdQ+BUzp+lI+A6CMjYvigrVvrvcWnfVGd0N+zjttEYlnrVxa

TEWw9Rf9Wjuv5ShHTOZUp1GwHtZdtI0cdoFZEgBYO1O28/n7Da0+puJwN01k0AvV0RJpJ3B+Z6x9kzyO

5zErY1HHGDoKaczZ6V3BNPciyh2YXi7Tn4SgYw7bWN
5anhRa2gvfz802yGpRsFFNu4SfKwP6y2+mkSXBvnV+HhUJqIfQ1MMlCtVKNm+kGARxXGAU7m8MNjU1Ph

se3yUb/UW9dqkczVEoiz/Q7KvGFBGrJCQreyD8gHSTpCFMh3XDA3bYA5dfPtkM09hptY2vIzUj14A2wX

m837K4KjeW49chwIokXE68kv0cYUBV4SgU4LbkQw92
Vq9+Gtb4+MdvJ8Vcn4d5o4y536UxY5fLLy8FuoR2S5EuYYlk7o5+TM/nxCUyRH6+2ZoYuHY749wcs6L1

zv66Xd0y7osnTercHil/lav5r0lOi3Z3DCtRy44WuCYtB7iKgEo35NDgis0CA9Jd8r4O1AjEPrfJPb5N

q4087S5b5PGbVBixllWHT2ClsURmjtLuQzp+wa+vpW
2i7HJY++Si0syLvRlUGahkkzBJ0QKkyliEqth033gwz5lDY/9w5oxnzC3pSLxNlvYqMtX2qR00YNv6mT

Ceteje2LoUSRmU5Qv9VGl2NCRP55nRbayM2AJcwDoc2sgTs+ntPcHVnOtrocc/4tdkVRfjZBE0jrA2H1

eW6ugROIZ7I4MJRwQ88ZXTgRGhuLSPW9CZnjKBQytX
5qCUWTGmOMWyDbQ6KEWCTak+JS3aNlgkvpnLYxA+MH4CUNhofRnBtIVEkJgfraaBnwOuiBSnl5DbJuZX

FU0HN5z1hV8ki4J0W/eY6T+RjH7pDHkrpNr1bAR/XEN0cH3j3phzlq8TBH5Mp6XMkI0ji20OU6OBWX9X

ATFC5Dlm9uajS2jOJdyhSfFV2EFQxC0Spi6QgJgh2z
28GX/kj8P1lZ/pRuZ6lWT5E93r0R/S9p0AT0nFrt1IN88Sr7X3PrZa5zvZ3T7ezQz1ZN/q2YBIhB8iCl

I+U3eMY47BVByT4KylWsx4a7oxzbcFnATfv0+ni5H8lvkJS28qWjg1pUfUxTBMUp/aWNA6G4Xc0pROfX

3LjM2DWX8a4z9PEJfaXEHlMcJ2TxdbtmRMHVebq24c
rkydQIKHTD3IBHSXqacdgflMbbOc+g8O+i2pSfb7qdtD02Vff10Q2YShDHrlZaSJGpAfGXy1QxncFWbk

Ksl1jKcsk9D/ANF+Le2apnPcbBPXCyGSRswWkwnw5uqp5xj/FWSvx20VVZYbFhRduD8cm8aAO/auB5kc

7mw7PO9p93KoW2Z4TblrcRul8m+RB5drMFqT49r7ut
dyT/JbNgssY/VALgq6K1HjehmeINZxyMpEDcLEa8Lm4dffwpygMhp/wIs5gB2/pU2l6T9k+eotgMQKJM

6Nqjhp3dKb0ibJdkWZDbhkcysuolu62gsPxAPn6Cky5Cs/Fjdc2mT6L+/8L61m9/BF5UH5NWlMSxyIpn

hzlhyndocY3enNtjP4BQWXGNWsIuRm6RmZggkJl/ms
EXEuYo2AfLe69856cf2lABlqpGmyDd3DynBQTV7Q6et4F4mFxR5GykS3egzpw0DU2YHDgqHn7EHQgBxN

PrrAXR3euLDC4YF5WrX5hDRu3ZEcNA84ODXYZRooLhEG5b9dUjv1WMyS7Rz3MtiY0nTb1ocqtLwztRyb

jQ+ub8Y4uPciafOktHuQntUzQUj0o6Yp5WddTdmCnI
LoTO1ure1Mrevs0mzTexVR9vdAxUCrJ9uJwnU07NukZY3HIE3BKXoq+QG1PDf742+50DzJ+4F5J4M/oO

qMuQCjfEttblPydVb6Hhlle4dzTQomKpmyRfB6KjSvuJyJwT3c4G7yoMTLB/VK7WJoBqEA5TxJUVv5H9

+pKcOlnRX4qlWWh0Fu5BESl6sc6XG5zbFhCT66IQhO
nGDAKw1o3NNpo84BM5PMGvzeD9ZTQ0fkMxhTQ9+ae1ujMr/+17/MEdbKDyVZpGP2v7Zl7qWr6OmyVqz7

WnredU5F7ZN/oF1NjGsSyfVwjsVGlZ2HWFm0Ivk1KVyDnt/F6dbDMnNh84UixpPCpHRSg153vE8TxJZ4

I6bX6Mpkwn2YvAgYJlwOWw4y1bQlm/kX07UjmbQ9wz
Kyzh9QCjL3zzWYaPHwdl6JArLYXmzaQPJIX2pI+oVcmsmP3BUHFiW8D9uaCruWe67+Wmz+Jq9WE4ZHWx

wMtdnmtk5H0hKls8+Ti/Saw6ftojti1QpCDuEtwLwgPvX2q0GXln8ysMtplh61XFKx13JsnmwiVTvgt/

5hg20bnpFTujTvHayqqIzVgWgyPTPkVQd6DeeSykon
89A5GDFM1CwUxxf0TfOoq1Oa9vqOue6tM6gjc9NiWWpj2Cj8h4BslrtniQUQiHGpvVAdge6NUkO2525E

6xkgjfEvLvnuUUFcrB9zSqko/2hR4qw0j+iX1D2CXQ5G0ahqtc+YtgFNUlXrDe538/10PdHtYsYrPEzv

7s8eeVZgHHByl3uZG5UqTCIpP97Cc+3+vZLz+1icOs
XDc4P9fH6Y+zzUdTn2xldWjSRjsxqA7JB4EgjBd4cvEJnecMj5bouQydG3U1L4/R1ayoT5FC38OKy4L+

0xzqJh9JpO8EKXHYO0xVks+tRVtj3xcdJmpGqbyvGZbY0XtU2uSR5VtZ/VxGuuNeZu7dcW9UnRVYE8pk

MhGSYGmT88Cdfkyo0eBsDOx/q/naRGY5xQAysOPaZD
PTFClSJnKWiqQWxhAn6JLB8vsW217lzwk2Qz/+M6Hyhsj8AXqtrr2UrdxDi5p5olyoX3qEEUD3Wn8zkF

t6Nor5ZVbwbEgQtePSlpy3csP/yLHUAFH3e8nHUqTp55Y4MRCqjMUwM158nWW4C7jrX93wr/LXZmqhuj

HFc7UScLk4fPwL/f3HOYbo20SMtp2SDo1OCO6haTW9
6vtU+eSZkPPBlBZ9VhKD1l+ekDbuP5gvXSqdRzP5q88VsVkxsPCjP1lUnguVWTs5sjNc3v8+aGE/OJ1o

mpCI0qBqejzT5Lm/qm6j3Rc79D1vorZlv8ATIg+EHKlmYQDtorzmj9u3eT65IR2LUCc0qcT66SaL5nWF

jGYWTVi/kjRO7BwblqrLt5FBV75U4wPVbLwKiWzsf9
LbCLeZKhBxL6+370z2loOH6xO/Zi9UxeVzu4BoOTEw/KR5opWIrToqnJcGnK3pNfrIkAlnj3EialR/7W

5DN9UzqL079Wh72TgHTq9dkx6qgkm2upJsfasnSu6+V29ux4GBiSvn5JgDOQ2BqBUw++H6Ug9xqg9ypM

DXFAFyB09ny6E0kUL0y4o1e9ez8hLI7BNj7Yr8UgMu
Av0HvH7jPB/AmOQl56Zy2m9eAwHqdUVwYC+/j5vVzpW9Q6LBLxKcXocSf+l2bin9LDE0X9heqo2X10H4

qFGEzQsBrq0A0HnE4HorYIw2j4p+DuzKsuK1aJmK5j0hTaOGawVRASej3Hln5/zDd4KcJEOwJ9cSnWMA

xztJvhCQuw3zNG2sjNKoaAB9I/NNlHk4v+t43zzQHw
yYIFwTR3CiLpuUSYKwI3WbzaxbAOzoeQptXHFFXdK/+agavJ1K/xVEjOLpPxIJWUm5NnYPmAVwEVTsRn

WCQMAZCIQP9ypX5hiKIepn4vGqhJKjBDYXsTMUDnBuLzZXwS81ffU/N2uGinVPFKmMqP5u8qNENhIrxO

9VVmaOMs6Zy6sAJgMH+U5O0XZ0fZsJ8Rgm/Q33T6C9
kyIWHQ1I8L5n+wE12Blec0r/Rac1R5arcqrEBPfJ/B2wNi8LYBlm87U/nFSIsIiM1xJptiy7UGHMN8zk

8YSxJqnlrds2rPyeWodPT4D+tPcmBkK7+5E+VSqvzhHi0/2OQw9ctQO+TgEXD3tui2aga7F8sPw6xYN6

AdXnf/Nms1GkGIgHAZMCj6rQasB6H9mNc5pR9nw3pH
CeWl59SDa9lu60A5F6Rvp+8rNxNmb/c0tqUn18Byl40jKErxNa4CmDxVMYzzDlLvoqsnAgkEhsmc8cVq

7sycZwvmYii++xvzoMW80v+N+Ep9Y+QTUwnyOc2nm1t+m7Je7sgZAKj3hkek17AsJ2I3RFsw2X3NvyYf

7N1MLJ0O0m4n191Hsfo20G2V2PFeUI2Tk7SMK3EaQt
wIBoXf0DI2O8NvnPv1EYIJBvEczzWcWl0YA93ECKgPUZukqM1nJfptraIwdZR34u7cwKX1HRQEvmi9Gb

7An2PA3O6uxmfnwwQWH1T+uFIhNEW/MtCoFdkzB5yUxCfmn3piagFtCCh4Jqb2LTQcDx9nG2lTv3zgM+

GVJxr+YQROepgmfMPaVlHcsyMleamkmRvju/iasH2q
sYBRvcIceHn+gjWYJAiBKUbWk7q5B92uSaVXRy4V7KiizEZrNPnWqeNt42NADTzyooPjhVJbTka59TZJ

k5SIvRIrjSmq8926eKJinBRxJfVlBNEl4MnD4MB2FbM6qouh8smsSOQZcCBecfK9Rz1hKukQSQklaH5F

4mBVCF953jcrU8YP4vSfuU47M4HbCA1jPGLjNHLyFv
EvmZJXXQEicqVEHkwnNRD6ZZO1/rTSU9a/ShdUYWmra71nHQzsyuNPluPfTWMeHsl2Syuydf+cSskAdp

22pTShzMxxpttbAFVSiohZgB5e+gf1bPs36CNbi+berGXzx2prze+DwUFxRNocx4qsslLLuJctQ8uPaM

CCTxzLY3I97eEd2atYQr2LlMGT79+Z+d63tZy2uTK5
PdauzqgMQUR2gHEF9Hl90/DZa4WPZsLGUq7xMLAXJ0MdjjCouf7jFOdHfF5j9RbWZQ09op9IizBwJVc9

6fC1f5r4Az/E1OEozZlkVrbRvl19ITNtBeg9DFj77Iis7ZIt/BcqsmwFZquJXyxz9ofZ4WU1+Fu5ogRV

oA58o0CG0VPlJmlQPdDn0DXgkAk7o8j+ITxHkbwICj
aFHSY0LqtWYoqQEylnRQIo2MNpVNzS8ZuEs21s7Bcxz1ZJG0uPlpJzA0M27mMw3dFliSNcmWlkoSlGD9

nKXqia7zou80wExSiGVNVFnAuC5TU5aQtXCBkiFjarlYGh6yBPuO7pAIwESkAgDf+GbHfb2z+HP8+nfk

0WS1u+/RNYe6eI1CAudVjXLGl4CYre4C8xSzZuX+mO
v+/o6sUsldZXEIrjFv6qOKdwbYcqU64RqtceJr9dXvZ8Tqj07tYzWjuWf//bqKArqLwVicfXDovTKFFI

ADBNslJGO8v39WqCNp0CeOs1SYsMMo2At6tMcn/rlh8lSEXmHSoA/EQQsRb8j7NBbPWKkSl8c0V1icFv

ZxFPGmrJO3+wpbEQg6cAxI4RnQe92CHlglfxrAyoIG
o9dh60flDMGpYM4DsZWbgeSoLI/a452BX9sI7bIw860nh/Xb74j9bOXIP3vFFjsPbD4pb3S+mDHtB5l4

EMtGe7wMoKt3MU/AIPirk6IO9eowTMESG04KRQ+/Lau5NRn4EpQotVmU6conmBDJfwtWn83PRSmkgTQU

jhIcQoghTADdqjFvg438Cm4D7uelq7rEjX6mRfL8Tr
Hw4wbj1QtiXvUm/pvJeusnV7ir5JcOY1c14Ofl71mlVPWEefH2fEJlrrFFjTOoW2G9q3Emugngd3iNqs

r6TBQ9xRDpnD5Sj4YbIRpOIN0wDhNlBSx6VBk+6YXrOVZtoWmFhWhO9GVvCdkfQZ406yaxMh97n3Uudn

a9iJBnaV+wlNpuu65CwVss0psiGJhC4IBvu/I57xEg
j2VZCHzuUI5n5FEQ1y3vcYUJc82ulYebMlM/FhlrpDS0pXOIL7jNc0bER6GxwDnU57PSwcYWfuDEahHX

Y7v7O8T30m9wsySEtjwG6XisoNPIds+2FMUwOBr6Hx7ArMVQ5078K2l+3WoRWu+UKYuXgpBKjlcWPyGF

YolDjQsZ4MnbGw4DEFgPkPQy+LLS5UPv4i9pxVWk2y
4xb0p21L367F+K2xbVjwRmt+1pv/YMxxIs7g842aE8eqtvMWZBEkn1OSTZBcEhy4gsd5ojEgjCPhp3Ah

u5UjsP2fIH8bnHjMm9SHakf18pxmgN7q/JG30cN6OyolQoNOBTDEeHxMleW/VXlGjTXwWb9nTEgMsZVq

7IfLq+af6aa7wh/lxEELBbt1Sql/ybLnenROxmqfNk
03+d/8kid7yugU7aadoSTn8LwYOb91m/K3O4S6Kz54SRb6m1XmhTeb1UJ0BQmxihP+ehJIMWi/uswS7o

C7t4esLRUn/EvLYFKkgM8VvlGEcGxuOryJg01lLk1JGhqlepQyWEM8QEmug3RczCLObSLp95V4aEz6Mi

c9ghmtmjGD3uxIETZBkntvj+5G22PUC14xPTPFNWqN
4N4rR0Zh2pW7Tqjehnl+3oBWhzvkhLxj2xok2ZR3gW1HuvaFZkwTr4CqF+ONfhnHo+jaxNd/hoRiDbBs

8DxAAI/V7iUH0xEDBUgreHA5id1szSgdmPcxjRaecPSn4Wve0xKF172hpJsv44yDsCxc02c458LHveC2

SjX5VN22lKIMBkLt81UfR6qPT+gke9uCPE/5kNHleY
RqTVbJLQIzncN9u1M/T5Y7GQPle6ZI6qlTBt8Cz68uhuaIwVwDvrn1AsxF/RufEhEFzzdRRrNc4lrsZH

/Y5R7MkppS4JvmyaERoIPNQHUrzyeuyWKTQn2qXmNKPvegLBUoiMTVoU+0hfxoBH26j4nFnz6bGQb6fj

c5rtEZc1Uu9+IWEOlxemE/Z8hKMEUG8C0ryCnNzCS4
d/Eo+7MkQr0z4cjxrnv8b9jcgoWX0iGmBe7bc4ty7nXeNwRpWGyHGLL+6bhoEv6RkD4/OYPD6NxVhhns

Mv5OrXp/qMBlhGDb+jE97kwyD/6+ADdws3VH63uybxQ4AhfG//VTP+eWxqapFiZkJbuc60NXEZESHuP0

HP7CNWP3aMT34M4R8421+ViLQmnEizh2FDYX/WlgW/
BIQ9zvkPoz6Pmw1KTo4omFiv+zRJcuX4rKlditwDrm0h4Jhv4HYpOhm2FBGNpiXS+bIwQO5B395aBHiK

3V1bk2MD5p5IKOAFzCpX+EmzkzRbQGhYbpls1senP+0H2n8o5N0DlD85RQAemhfpEY6OkiznNdTRTsus

aMyPavfjmFmT+KWJUaCgQdAjk/nUTttfwr3C63PspX
1ft+S4+pKlANHs/ak/z1aN8NjtcYxl/Yo8Yq/j2ldSfUmgktt7oLEA7wNa9fFHriAIJyxbFCjFnbJ6oU

viielzYfndiHQHyMGndtsVTtqWYL72Jbq+2O6Co5Z/U4El0cjaCzzjBtIjVmq5Eh9rvp5LTZMaJpnV57

SC6dyaNWjURni9Ks71orq5YV+6ddKvfi+IkosaJfbR
Cmf/3oE3eoXM7hIpo/s/gvVPWv+xhLxJqNDljJBoQc7lSGQDAsll2vS+prwhgjxG5u3RN3g0Um/X50rN

h+SWoNecG+McBBIYKfVGMjMeN9LlgiS1i/ixokq5ef2C3nut+Pg0l0I9DOIrQE1O7jj6FMy+61tfsUBY

hOe6K5Ph7LvJgTuHI5NaR1jEGT9/OsmQdhByObA35t
CUbkG6DuRxIGDigESL74lIu3mZmT6TGZgtcmt+pT59m5kd3pHsSD3X30ec7YFdl1WXW3oZ3OEFrYvren

oivIMwFxstDNZaqcT4UsMdON2pAaqdCWwrPZSO30rWbclTDz/xAjS59ErT4iNNBuKpmV4g1PCAPmuRVT

Vk8qpHOQ+1NJxkT2aCUa9+4AHhEDrwhrk1RaNl6hky
259NcbROhTn0n/T8CBNy6gzAc5iGHw01qJIKyaT8Zuw81ClHRq1Ov+KqBAIqQWnJ5KynEb4zh+tP+Fqt

tWlLCYsy2jXGhP87hVY9OfMM6NBLYW5Q5N7XLM2nTE7oLQ5Qi9JMQzfCvtJJFxx8Wvfd7qesM/NPZFcr

NaEaTquBl3CEupIpcaDWCqCHcP33EyOjrbcsPmaWve
mxAXievEovh74lIErKJHjRd5PbbjmBubuJQrBqo3i3axONHGyHTNwqX9qJh0JIKYbPEaK5fyKPyw8r/S

LWiAr83K8ksiz7ZbYW0Whar/npHWgG8ycnADbgM5mlg6Vau3xLYTEBKvqNxOhqY0/6U/tPUC88KapExa

HIsKSHz1cNwR9HrZyeKR9s/JlUXX9YOfD89vwdkTcC
wJ3qk1PvR2DtJSEVq7++iZ/RBO8uwH8+rCrcGLU1kUDlf2IKXeeH4R6lJyKMDmwJYFHnMMxLedPaCPAl

sP1YNe6VyGy86S0vERQE7LcxEfVBS8clKZ6/vgznMFi4DR+AWExgTou4fRgib4qXaM3VkR9RRqgDe4WC

h1cvttCZ8U8gI16vhfIh5f1wuN20Q/gdDga8hj7s4R
oxBpyroa33yrszNkbgIAQG01zHjdfK5M55LUrVbf2zzpKX4gphohTPczlHUy0k282eqw2O+qR7Wpcpnb

KE68wRbSD4qJk7rlz5tEe6L75/7cNSdBEWoNQnOp5GePdCYls41r9ojF8ojQhTVSPS+O8lE9/0nD3KOh

s/eLq+QT7qTFgqFSAIY9QEHdAtl8X8svwvoN4T5ag5
9c7x+QtigCUpkAFSy9CSOGNqe6KjxYtxWYF9AoVydg+w5M6Er7XDDUFpQ9kmM7xwhX73iFeYRpTsCpi1

3dEWE4OHqJhh8SeM5pNodcuBQ0pMqotwSRzShL8p68irX6uDR4oM93bX8yadBn1V7vRwShYAx6xM66EO

demp/Rdd5GdTigX6b3K7BS+MsQz8flB0zm6jE6JSke
XZNJHLSmzUuUAm9BSsCvyeyxaCe/f0zzKt7RtByablF5WYPg+17zqMh80kSKGyP5SP53ejaJM+uyUi2+

3MApIQHYrMeCja65I3amXI7ZbdN1xdfr+E9HTADxIPH63mZ9HhiyXiRDbroLGG0bnUAgrJDm6F6YAwMP

Jw1Cpx2vkYR/OhQIbuQaV4/U7nMVmeyj1eVY7T3Aoi
faTvqOySRuqltql4B41ErCj1TTCV6eaeQh7sYe9GBU52/5Qq81MibGclQ116EKMmM/4LtXzco57R01D9

NKpJ/90KsB1tmXxzChUKgTWKsifVT602LZi7MrQzEMzGfRzEH5Li+gx9BB/NW06ySrJ0fOB4lhPF0lf8

YF5rkvf9aeMbFXLYwzfF+jn03tLhRPI517Qw8UZweR
8ogIOJtzvqPKdeN2R8wVkUXGkWpTjFvf891kdxZEVD9qA9MFB2nWwxkeQcDH9aQptblyF0rP0Fuf4hNP

7YvDbSBqHyf1nxjbmQ/mE/+z5AUJRTAwh0MJk4JjOAkSdaUclRH9g7Nq53rksvUC8JUctg+tcFjR8ms8

CF1srmyVfHuLYqdEcIfIu3G639oJNCTd2s6n9g73xw
QCXU9uPQ/bM6Q7ekhitksXdE5c1S4AU2TlkzY2I4zRdPOf0Ek3J8969xQlRQZTek/MLq1VLNd7vSD2LN

ZxkqaGuow/l0Po03HljsVeK9pAuqXFVb7gvCV9gqyztBLGrsGi4T8Hl2/pU9eNibdBgdADEU0DzQ6+vb

pZB9BMsCix6DH7Eftl3fDqwR6mEWsYAW80zfTljgY/
1E1eam9DPzAFAOV+bMiQN6CKA2fnXm1r+9vy+QVODv/vxYNsBLV2lCMxDgeHTwrzQKiNX8ntSh72268Q

TCYuAiZU/ahET0w2MswEZaDCXrTAoGikAvpy7wFM/U4gaWU/tbXlLs+xpvlS0guPyEDdaR+Msw84gTq6

6VHcq7Dbd+oWjSGqpH8lVyT5qtzcOBlx0CrpbILsAr
T0JY3IXkGAzX0CH4/s5TW6Y8PV1C8zjvzdRozwqn7DRnh2L/v+lZp4MY6h4v7HHtRn9NJ5g+JyBI7e6U

ZuqwbCtEaCzpDRKfSz4op1wikAMUW6gaSa3x7W+YqcbldRWB4CYCu7mxW7i4xyuvQp8xPzW2yd3iWEj/

jv5Zzw4Lj9QDiSmaq6btnWXm3exJi9hV1k8N3ucPH2
jVyCRIkHyBS2x+piOxsmoQkPlFhhEjYT7rUf9jnENAp/HbKUT853tFXJczD4Cx3rtgjS1/tso3bCeXDT

8gtBbhzQcAAmSY5/gs2fvO816kIewynlnz8n2hMLeni0OMJLCMUjFGQzVe7Blhd4d1jmpQh2hJzfoaO4

F+wZWd7owttsjTcRGHKZcRXsPyV2dlGwj56zcxziO2
bVnuXjEbsbTq5ROwm44s92OHLe+LUh2VAcGNpFNnFtzRECQuIqZNVhiDcvQyAdIhF4rOlvFQtCpQaqzL

L6G6uAbn90McBR6qm/4I3Ocdtz05zAWEUaOce8TeNPQN2e/8HkQnh+A94jw2ZY4yAcvxsPRufaka6XiU

LyGWL+qibox/BPYWkDbGvoZmyh0gmP/3dmtrKS2j74
WbCGfUCzdPChITgLy4/s2+ajnpulAY5/772OZvzOkDLNOL4jSozBITpcl+rDdcWqgdajZl4wYoccMrid

/ev1HpeKB9YDmIOg4JlUpiPZGtlfv6F18Ok6cSi24594UB/5AHymeNRdmHzFbJ68GJvVM9CkuoOjuBf/

16kWRwJBO9CHqXI/n2eKpgjfQMw++TaMTiRpNZwcPI
G9fud4rZAoqqd6L7Aii/t0/FV3v+drqpEQNUz/xaS9q9KapzAGJ4Oy8jRTJSy0UZp+rAyb0k239Y1lEp

F/D9IsZIsGdTU2TQLNNYcLA13a0Gp2Khiy/JuMr0KAWFZWJzMFlHzNzMwLQTBwMrrk0mGK/OgjmadT1q

Fbd+bZqjBzOTbOD7qjt4Mj/2peOyjO+MKTdWz7PGq9
ilNHo1jSpzMnRajd/ly7ahBTU2kA0r8Xh4PhNWW6I9H1DcbIMxjLH9JxTnoYN2/w9ZdFK+Ad1leE81PE

SiBfkeftMOUNiGFNrzyNlCD8g+U7cpzM7DpDyEeAltTqFdYsB8uhnMb7NhtI44knw6JaDu3uYe2b0Y68

w1zWyKI4+dJ0zIZczO3XKQTkS9W5QC1lIA9RV1zOSa
cL1GRQVylABSG9bVw0JhTza828fbznSyCOGxMPP+i5L/r9hE6yhUtLpx90B8OUbfkjQKR76IHeiPuI7m

Ft0IN1LeNkPdcOKw1vr+NiUitE4DECxpto3lEZVJ+TU1w1IbCwRFUZAiaGMw4/C4iuXNsv76LlCX7QiO

cBu8g4QEcVIxEfMl/XRpxhHvN9qx1IsHS78+c2kf+l
Xq6qdZCyY94Ik7LGz2+Q1KyokkgQmhMoN17iJ2nUlEEBV1zBK8yEo61c2FmuHGuRfgu/argzN+eHU+k5

vBSYDbuNd9h1G9LheusMdZ9dUf+MEy4otvvMzALfqyHMdwaAVpxp7Dli9L5zr4JtEJ4TQw/VuZBdCXMJ

0gpuecUbCi+rDuGXKRxg0m226R4xG7Wa3lpSz0d9N2
2F1s/veUuUqPiNqeGh6V2Ou51UJsaSZxc/XdxNrwdB3dazuPVn1h5C/YvK4kQxjcZmRbxfgp90w8dH0v

d7Ia2YZkNZJ4Zv4VoAPBPH9/asg2B2tENZqDZB/Rn3R/+Bz7AGcQLxlp8qOlRbYSLJ8VxRYGEaWMxFP7

fWMIgl6BxnhUBJxfwrpp991gSXXJ2ksbIkjFAbNsZB
p/8jqnyFVZqKN5qJaRc8xePAXsIgaJdudOt07wBV5Z1azB0tHWXvEOI+Ja9gpy+pwCBs3btkjIZU4yRq

GsG+xTeSF10W8wLdkcsklArEeDT/JV7rzjCG1JxrVWpwzXZfh2/rglMYXDqJMxnN18tBsArIdtyz5uDR

FsKEtMb2rcxpUjQIFGt4OB5gH/78Q+QzyVKf6kVuxL
PqqWVqKpTln4U77A1/PMZOwYN0zBbsP6Y9aghOREzyRFovEHb7oSlj0oE8W4JMlpav33mbecLO648SnN

eklY7oGpPqZhWx7HjZjXSQixkBwVI0Kd7jqtI3FmJSXa6XGA/7I/BDCAWkulsuNSf2rBC57n4oipR5Vb

5K7/V7IHggFCdkWMSCeqArWK/y6KjdIbdvfIX0oCuS
zPvU9WbYih3tEdpG2GfLiATHlwPYqMXW8dQ9zdG/RRKr7q4VQ4TTdCzRydiuUxrGexfkmbS6/vCD4kYq

FZqTtqB/jTgunDnlNPGMDxa/fWEd/YOqlqPvjnjVe1FYcleb48w3+DV+5Gg/pj2UyVj3Tl05LgKjv7Kx

Qurwjf9NooGD4Ghx8H14fXlXTVRU7MoL4bYOXJJcua
tqfEemhDQM5Q1ZLd50Fp4zlRQIL+KEcfevydX2388elJKau8DTzOyBFtMLoHYQ36gv0q/tDVhv5/imsw

Y1cCXnZ+zEkCMiyQCYFyc35ZMxzOibuEron8q1BGv6oVUwMwCrqpa86zD+fJP3U2lXYLBZ6KlbuTrVHR

Rzw82fdN8NyiSC12NLKl0uesMXaX79FTXylWR0Kldk
81Uoe5I8RlqifNnTPTeuFyMA3soaBgG2Dq5leXSPiBUHUpZprC365xLpPX/Wi61CZsadnLnUCh5j+PL3

iQZnkC+tPQ99WVPRHgOuizNzN/4G6vj8ifarSEOC791iKIV2tDIzMSk36nO4UuRxF6neM0IUOPyQi9QO

o8BThmsHJ5j4A6ROdtkh2CXa6HMBEVHhZtM1w6LgZ0
mO9P+VfT3rN93skU+B2IPpFBdMpvfY+/bWBXnkd3i2P8b8WzMiHU6obJXhN9zkF+ZR2uDjeD1i2L/NQi

Ny2ZydAuxMhKznnuGvCPbTHQVz+cnPYkqr6KLia21ovwfKbXwsxcg3NMYqGyC9O0xFE/Gjnn181MkniW

ZXh+RDXvtXHZi+ojW643mPzzZYBzPu0WNuPfzqigbo
21fVYNyjlOlL5Q2f4vgRO/tYZqGsD5RiTnXvbuUQPQRCGF0PVbGvfnpa82HG5fOY4lwkjdRCYrmgU4d2

SV+mLO1a6YNKxxLHiSPGA/acMDTUosqyoLFrWCZmIFtUwih4xi+MI3qC480Zpd8DimDZNBoYBBh8MQwG

yc5tmPyCPx6zV/nhajufXxLz/AtCD/jYMzHXHoVv63
hO8rPjH7cQJ24QteDxUgpmv99JeoYsxm2dZDnjqQLk+2N5iwit0UwsH7L7QDXQ9cxdMX+k0BvMp+/A8h

vWxCM9toQpB9Gxq5z1lql8u5jeR9EHbBoHuEXiX/0ljMfZ6ZP3NawqbdU5BvrM4RFI674bo56RWMphq+

7yqzqCs/elJZBshefRe9awK7h36fdypVjVPDEsedoJ
k6q7maO4Xj1A9LIGKLFkh+/Sjw5SPkEQ4JT2JuN2RmYA954hy6yFC+9oFxQBfMdXiczi+VfiHsfRcWk8

zAlQICx3dKiT19HYE8MKXwAI8Ew2BN4LvZUKmXmvEqA9AGp7IksfeDx7yHigZj7gEtS/qBntScKhY6ou

dm3ceyv6O55q+pKYxAW6bgAZZH4dGkoFWf5Oq0aVET
aMTe+NKyk9DPZhHvI5eKqsmyStxc6zd4A4xzhMSN0/WbX56gKgP8MtL/odThA5hf5UDKAFQhkKHsHVvt

5znh5tQbWiJAuNKBDqOokQESUfDdvDNAwyPrNyGWXslFRmKMfqUZNSD/OTqsCRz2+GpkW/6uKw7CkI+G

6SqHzU+qXC9n3oKhpbYszYllMsV4NcavBdfSKGEfrH
+QR32QIYnst01GSMGsySJrWlQrteuUpIu2qR0jqPCw3864N5z2w2UWLtIyqpBe3fYBVrN0KQqtSKXyNr

dTvhQTqOyU0RYgI1IR+y9RkW8xuZ6iITZtl44nX8fWboNVtVvINqb7Yg0Zmw5vsONfWsu10XCMJEmBPf

z4IndJNTO/+Oggv1u3eE8aZ9ldljhFB83ojYHfu38Z
ik+um7Qe15HLOoapOupuJix6Psdblky/EgUD5b/cgM9y8lUV0y/vKwwL4QC2ROYK2I7nRilGLB9ByNmx

AsXmYVJjPIkM+PlPt5Ti9RbqaQZv/5I4f+gcfxtjnhOdcbYiKVc2p33bza2LCzzQzvTmqywxN/JzYGOw

8uH+unXJcxjAk59s5PAO3kXajh9QCtwsg8hVhJ5P6b
mgcpDplGZHg+prd8XstRBGhGblAdC8vWnM6kqVhx5aCdZUOOoFbF6O9WrNlmdMbCmo5TcT8lVTrhc8Lr

GJaK6J8nKz+3+P+LWXhSYcJ7el9uh+bNpJVVT7csuV3R4nGVn1c6ewuQEyFr0K/bCFsF2l9lfsL5Hluh

jWRKExsG5TjbkF3hFbSW699xc04InCn/DMAN/Iv7rF
LYoRUMWBnwwOrV9COyQIycGwYm8kwJ/fkmobXS5B9AGMhDZYp+8TKUfm5QYUMs99mPB6cMNbNGDZDmEu

sj9B55u5nfoVyTczsv/sjAijIBsd7IM7a/T8zNv+JcC62j8sl5wjVFvOQz/+WWrpMtVFlca9TTBJ90k8

tBn3usQaDoFifmEwWwGP7FDTU9U83QEFtRzo9b9FtK
lyAZYXUs/sRp5kUe+ktKEb9ZBBL4Tb5ByCp7eI4Au0PzPapO81/dDsQmxuGn7rBqL8e7AVp99sCZKmka

Hx4Q2zbZa+BQJeaNuxsoewUq+RjSIyjNEYMFCKhc33YtbPK/nHyTta80qq7vtfnzyaMesDdgHY1JOBta

wo1+2PyBMrmk9gu0zIkn7Kq90oxtUyXaLm1wZH20U5
u6s+eUQWn/IUcW0vAC/ZZ/L/o1EgBRfiQBw+DyZZ8iiy65IQgh/fbZ+sxYnoiHGaUvTf8v1ek5s5xCgD

6o7QwKxJJ6Rs/QCXZh2HLstt1BtUin/iTvttGHqW6z07Q50ba3bJT4vH6mn8JoROBbzmZsIX4c9XR1TB

Cm9vKfCypxKB/jFP6CulfgkEKEvLm3xrOcrxEhIh+0
1aRrrVa3OvD5XXbGdkqy0tSpXPF53zCAJK6R+XuTapzHFbNoqy5+Vu146UvvuaGIffVEvlDLptYw/Ocm

qRPm3G7efXeGbabH9neNlww3ifc544uOwZXMBT7/vUp90aNjjHWZS/QR9hzGG+9RCKVeerykiaWSD7sU

6BCOY46mSlTaYFfA1KWIt3l7j/VEaD5ThOqNQBCIfV
Sr7y1UPoiSevGoLBDXTn3MTtw+s3kk7ZE0UGYxMSo68BYq/m7SzPNl6yTaiKioycar3CiIju4CSdGNm8

NzR+nF7MzB8JhdpWRNINEiMoCCWM+8l3EZ9mlOWf7jWCQh0cmpBFa/lJVbvCkmzNhdnqO9gBs6oMTQru

LeVxUZjwdLI3zoKeudRek4csP83+QXxtKxtJXYDqxY
BAZehpimWSo+VflaAR1n1rv0Bf9CIPwb+6fL3NhwsPS9E8PHw69DAIy/U/qVPSPc3lvn1OO/6dtt4q8I

njTkqDGbzZZe/T/72met8jgpJ6jAVnmX8Q3g1G3gEJf2MK0dEEEObl1LLT2OZQPgosSw5E6SgNucartu

7DX06UD6064El+6w93gnS8OH9Ild1mT3n18bHse5tj
5J7xzp3jB//NeN22vrGYhMdZ7TtYYXTIwW9Na155ubPNC9qbfw5O/SsyddKCEcD9SxHjcs6okKK/gRND

Vzl9RG9asufMFDsnQPyjhuVP8yu5NvES6MsZA3TBPMUOGGuKwhokeS2Hz9hfipQ1oQ7gl+RAjx93kTVi

wMtzMAGKpBllmZTBOSd6Q6vh2b5eAcz3OmBUSGThnJ
XWz0Bn40JU+VDYmw6ui9ikDXV3NAwm2XK4Sja+cGFBMO2i2jfKHxLVS2FCpSonT9G0MNF2Q7ISTqKDmP

XxPcB/Oa9zfM8cQhsUGtbAigfF5qSgOdpLxeIHVF04lpvEF22w1nGH4DtVG4WLwch3e6gxC0kamlpenA

8KNY/+6bX2D0xKQEx5Oh0rIbZsPjX6NuAhc2Cyxa80
bMu/j00+aL82ja9OJlgFWLyHa3PWyU+6nm9TsdGCqeusaHCGtM91qJB0YM3hkhKqghLVWH0dGNRqYZri

g/e5yetlXt/nWYqjvlBjBu6f0G0QHixM3nNgyyJlH/bQwRyTd6jLX8+JNnz2WTfiVRKS7CQNAJO21TMa

zzUBD7ZhZ4Q/A7q8EZ+bV3rJGR1uKyjlUHBBt3keLu
xERQ57qry4Boj2y9fg5eVxP1KQaZ/Yild9bOJ67Cq+6WlOllaMYmvX5eZrbjCoqRTkADUJBtB+vpWYA2

4z8o+ClYb6wvz96eIzNjcJhkrf5kyVIqyz2cs++d3+ehrsZpLbdeV6qVppywn20fyIkrp8uTSx0NcAwA

UMCGPOmoradLWkyAow+IOlbGpjHVZl+xTOD+51wycK
dIzVs3s4mLBeTou+LaEvRNWMFmnPwGe7mLY3yW37puHOq+Cgn1rEn/Mu161y0oq5fnygtsxudi+F0Rgf

woOoNLjnK/Evc569wOz7SN0jmjllEdxV02cl8MsX8ti0Y4uMTvXQCf4n7UVhaFiKt0/MlSUTID/CnNOd

9cobjW8xf3o0tIHgimCyg+vqXVPLOq/O0vreM6G+abhishek
R9gqXIUe/MKAb+jh/RAccZsRpYQkJv1JRpOx11cj7dgOPY2qVNPP3md4ENaZjG0m1AAgC8dkph9o54Ye

NgtZRmIQKjIjclE6nUTGu+iKP6ddrtMDAi7GYdMsBlWDjUDNZHMvIQLryLelcudPx4QBZgVQuNH6UEK7

YObcF/kb645ZxK9sE3i/U7qj9+Cfka3eNoJiFxthoa
aiogzzKhRx/+Lb+4pVLKFkZzqUvTgMSsyq/asnXR9C5rxjSQCNSNr2gAK3t5VHi3eI8vfydImruvaI91

WJtbr7T6dubiT4sz+N8M5jak2k7yuH5w9sgwNVmi67XOaUojKAZf3V/NCODJ1+2rfk5jXiTOK803ouU/

1Fo39wPcFrNkS9ihX/tTieHUUSFuY+kn6kZO1lcn4m
aE1kHhAdQnah9hrarEvc05kF+LmMQF8GQUkzrslskUDPy0gHOAgGRJ7D977MOSn9SnVOL6sApc9/a4ay

gFQujw7k0aG+iyUNfbusWrHLrXQAGprVt8u+vZPowlmLcI9VH4aTGlhw0Lz77ygpeDnHNpngOfSQf/v8

JuNU3163jX3sXhvQAh3Kii1C0qNfHw3F13MiAmOEOI
dxhUQrTofHIy7Z5Ep06qfmKiU7ZfBg1cdlgODVAXySQLFmpwDVNzmB6uBH2rz9dD0WtP/4E4xnerFHik

Y/dt1jszi2hsxcGQDpbp8DzKKIGcv5Hpv7UvkyJnoATKDJ1WHjcOZDDvckadGfh+G8ZK/hoteuNp3Zl4

gw5hhsVLtp9JNg+zxUjZWOgkz7UflXl6yDNuJIeV2A
jiyONiraIy8HpUQGfvAlpzs/9FV8X4W1thvd+2jzkXVcPTz1WsWHRLqQjto8d0Yl/nhD5W4vjJW3dtF0

vKlk40kB2ijwnW4fHplajVm+xdGQDFj9euqTTnnKaJaiJhVlJlqKfOxPlh0dheiGKDnxL2uI2p5yZLX4

/tCFSwK3oJZW4zAIIYBxVmb29zNVAPqnSOCHEQyTUr
NsP0kooGwPEYlHfunvqUHGR9Y3P4lzItTkkNS5IaowKn1fB4dKgD3aRRLntd0kvKBJBElDlMfnvEdeGk

gR3h2bOIy9q2zkFcjUqECwy6JJgbCPNaL0bdC+p6uQiqwFHoSjGS1ETJM3M4stwVMaZt5GJ9SMap7Uo1

1b1TCso1DVbYzkR74AaHZHlNIzbJYF3ZOm0/dr1ayN
4gP2DA9Xh8jYBWZhwbtageo6qF2JZR815tKpMOUjbJ+HpNaQXaUn9AChq2CtdHitiCfda3ugFlGzM4Fj

CzY9MCwZAlO+l+ixBcGsPoZVo8Zoi+i3jF2lwXECaBpk1PW2eL1gpmVRvSq3RzVC+bhJzkzZxgkc4qHz

yIk98+3IYvZVDjbIgBvOyLBz+8T5sCVWwroYoypr7A
ShzeekkmMrPjEzaNoihwMCf41XM9quiAQeg0+PRig1MZO3ANMPtv3I4r3fBkFjZZDgsEv9Yv1tfsAwXO

gMRQp5arpwmM8xQ+9c+zGdodROCPnNueYq540ewqHdAzvwQR5t9SK1D2yydPU9QhQtazWuvjS2xNVC6m

/ae1uaKOMbO1XzExlhQIFp2ju+ha7RGVckYotHYKf/
EPSKVhMpsNcOeleTB0FUjJp5nl44yvzAr/Otresc8V5Ui96cAX2Gt8i706IdM4SmaOFWNyxQd9S3e2qB

WNjQmH/2OFppLm2dDxLNysAC7gyacHMCtLIwgQIWI/dm9YECVnvBd8wnlMX9mlnA3CY9wDUZu0CGkiZC

83Qn7kkoA/UEa/S+slDmSjoEcrH0zgJWQwJnsVTwup
RXu8gvYW4udz+O8ONbuE/Mn6LjvO7bHS/5mq6+oE6eUTVoDEA0Qk2iznttp4/S7yAgfmoMHp3AwZasWb

e9crm/TEp/UfEtfRLjU5fSqrjY5mxtGS4xd0+WtM5PxyEI45UiSWgAbdcCKDjfEDt4pqodqba+qbabyV

3YvFEH97OpRjcUVjQ+TxDuAuLqSX+3sqdW2DDkzdma
WK9VPZ+i3oymzF+FWJqVUwqjTAeY1qWmQ4UXZmh95+cH77Kt8osptiV6ENbsoj4ZsEs0j7ywjCXmIaxG

n73WM4b9VbRCwgXEX5y/w1chnVr8AhQ+SmTJ5AKHhz/SPiIma9h/QeURZa1OESvdYHKd1CfQaD5VbQuO

csCVJrrPPxtc35P+RTCn3Ih1lflYsj8XIIWyyYNzc3
dsRPJWtnTgn6TZ3Lo70HNlj1b6Zv7o7dnhcfg4hV+B2iWr8lu2X7UsVU4F3OzNdvmmVxIg7rlRz+Nvj3

XIDlhIt6lLreycMRw1bOOz9jxa2fXX+9P4cPA6uI8N0xiRx7BT1m8x0vdWp5wcczn48+yZM0+fasqrKs

16U5I0dMepjIl5dPc6e8V+tIy9vjHjTensHIvkH4E3
0lFH+Z+iAN86qRYC7p+QChvDLoXEeQSxBMglod6amFTZFCKSjqQdos38paYOf6TlDOZlep2+UyajwYpb

+5Dq2V89q7LgKCahJ8r0rFYNvlmylU6EKdJnVsEDaUHx4WHMGtpFh3TPG6dhKiM8phv9sn+Xk9An9/S+

dZhW6IL3BMLot/a8Lb3wBOE2kRf28OSnQ8dRp+SJ/S
F2BIEUiYQEJKyYkH8ghUWm43qoCocsNB5Y4udMTgPPWQm50gHZH1YpV//A2K/oICVlDUwW2EYwWvHrTu

lhXqqAGA+eTeToKT3Tgd7DM/Tz0Gwy7/p58EogzLZMjxTlZiwIPG+RWfMBfPCPihZsaoIjF7Rc137t8+

zPgyPZpEOV4lD+GRB8H8pb1oJeuezKOl+WymSi9+07
e1Wj/sxBsrc6li6VIBJXz1Ze5qObf+LqBvlQZaNHdQaxuKVTRmBFhNbJN3AOAMceJkw9G/vKzbLKm+Pv

4/7xNLMzrSlLvPk8dlrOT/q+ysBtQGOUFgTlAjFwLnOwvQo5jODaqBv+Au5Vm4pvVMmO4ao9RjoBgF2Z

ShzT5YUyuBrSEr/oeiP96hRvG6zM3nRivDwnRWHetN
Qt+Duo3DagW/3p9whGM748cBLIUxww1cvbC+ntpfIIJ4086+hCL8z6BwJB1Ayj1E9bEAVhs1YospT68g

goSQzhm4ty2mZKb4lrE8UnkEkI8MyyWgu+oKMmx6KfhuHmsVNyTcD2fkfK+ag2os8QI0MtiUFcl5dY0T

jBTvCBpNlsFNJwarC0u4+RUrlwbEL6jLf2M5vweUrR
9fJWWPS9hmkScjEWtvXVHfKgO5GHwVbba3jpZfghM5ze2/ukod43ponThvzHDNvDWwtC3Z/vmjmK9Dg8

BUpVPPWik0M7LheCORgiLvwU8W/DqqA3YYnXN3k7JbwddMKxq5s3Iz1tKB9/NYfbXHUTrHNNWBzzXdM3

kTWaDjK32b9u1yVxd1BY8HPmLxGRGwGU3v0nT3344l
P3UscQMipbwpnOjeA8OP1zFFqA5UAzerlD94QwAVeVxwpJH8JHlTiGEfqjiuOctIKDCOzI1ajtqQwX6o

rCwXyOeKUbP8SEBpOXM9dz/rV4u/RebmIHVfwRxoCFc2FPtefvLKdh55siq78Y1XHFLbiOgoHljDD1j9

NQWenYmiWMf3vWX/9O5ANGD9IQ5idNILH9odDXHHoY
P+BwPORNK/h7hMlT0VawTcfh75HjsfZQxBgnVtXCE31vUylRS6SjjnYkgBPPCzt2zU8ChrPEpFwHqqe8

fhLdMMbbLxWe/o0pn4KU3tG2z1PEy/vZrKCuRJo1KIqTlxrtazz3T4nM3Sg7JcdBp/PrJUf70KAR9EQ6

W8kL1u+p7sJBxKJ1ZNjSY4STwNaHm5ivps3bSJ5J85
OPQ0Cd3GBk5kNpeRBFXYhsPj9zhUk6t/z3iXABpck4xOJGIuUCurXRMuaIZLPhUvdXTGINK2APFIE5Rn

nOREllBns5YCq3D8EDK9srL+YUFi7IyCqi2Q7Najh8/Ku/8TKALNYyOPjDWIFdLyNezbKhBAdBhO/mXk

WlDotUecaBy8pwSN7loDnV7zy6YBfT38NS+Nzlvqev
rPJfwxmPFp6gAdPmDeRjPedN3djf0Dc8Z6K1YLnoFSiOwauqE2T1TkpAgYRE78kpt764YhMcgEmy8aik

uv6bQ+tAx/BUCmTqXob3ieg6dyNp/XzT82AYeX+wznJFd9GEOKKGOrbRzf5H4cIJWfJ7FqoZtjivBodq

SgjTbk1ELyqM5jAGAp/CbAQfM2K5MhyzY/O4nXhpjV
feIQCh8M4MMpwkjMOUydajwUHF9HB1P4KaUi4C/PQrSlLmlEIw+SVH3xkQ3qD2PIEcK7O4gAr1iPA6lb

UbIJ3RijnliZ2IMMtX8b9RkFUr2BLKq6dgv8ZL49IQ6ky5YJpSphbMjpZN3odRZcvdHLIX2q2Kb95mto

bqX4kh6QCum4Fxx8W9JZzxcIyx0D7q0Ty48mrB/LKp
jmRKmX2RBp7ELYgoZJE1Ameju43ZNSeV7Hp4HYX6Cw0mJ5ycwIiMmvJo1tqYl8Dll2rk2X8Tz9/1/w/6

xbHWy8DtWBRmZJNlEFCVR2BrtwZTlaSCbNDITwENu0B5qTd85YMm8a36je5pfbP+Xs15zmTjWanGQIj5

L19rXkZUvKrYPtTozxvP5O/m5WNF1+p/OllI6Rii3T
d83Q6Zi/xUpx1F+VkeSO8NOqbAapFRKcFYnX9qzzbG/+0vFSq9xa1DWW7GdQrmwAbemXxc50JS9aD8D/

3FnKPZQ7MykD20huqUf72310S9JLRamP00fYmzbN7ZYz5B5TgTw9svGBYZspYssqfHpt71/HW+gUMkbc

IGH83UoK9Inqb8NAl4Oeg06n/PDwGQCP1+ok/pDWXy
XAi81V540Y1t+ob0rZmKPIPHMrtP+5/0T/sV7TK6aEKBRpCSKgmf+1YKsKjPf6wC++MVNvhUtbzYRZYh

+uQOgO3/ZpMrSbwgJaUeAy3jCidjX6JOwaYi4y3RuDF45ELkCYmAKI0c3kbbreqOsy3i02iZjMkjJbpd

D22Oz3G6bGMEJp1p1DxBtStApattumgV/7kDeGTNCl
Kjv/gyjaxs3YjJWu4AqyeJu2lCuQItMGngdANHPMBRwl2Ydmu5yPL/ogfjzrg42bGgUqLEp41oWn+YpH

tpRJmh7Z+08OeaXCgyOn86gl7oBJK7fjostfFJ0ooyfEmenlGy/cNRZE5kTYnUvtj5GHl9Se1tNGGzmW

JIHbpUWCvHXmnTWNiCUV9x08H/Xf1QEnSV2K7CNlb2
/+ECxZAiTaVAvQ8oN503Hkd74nu50eyJzhFfqDuwT7iPxQU7aaiC9lrrSd0QcHoUbi9G0Q3NNDluippe

XYM8dfWo+9yvOZ3tLzCfFzk4D4QZldqVIOF1HgJrQBJIOnleWhWvuzdX58I/wK5VPC9vZ5B8N8vfhUu6

xnmD/QkrciXO/ID4bBF8Bwl+Y6S/5zxTp3sdqu1139
1+xeML67W9RigCslPUOA338sUE9WQlZMEd+3qeUaOE4rzurJQwVY95YVIKeEDprYrRtNvMpQlhmHdBTU

QP3GxPIkG3/Q0yBhlrEhtvCbm9vbv2O+7n7x5DOdmmCaeSnt0/c9YRMQ3NPsAIvlZsyS3wLN2YD4Vbj+

PID7VjSRtN0cuI7SbarRW03mInP8/0/jPerpq3rimj
x/0L6i+JvZtysNhG1+8Cezsb3Tt7NLEbG5eQpqN0RR5Z0VNawmyMAeMt6mBJJJhASu3/bow1bm8HHZ8X

mvKWFChq0qDCrZo8mYivEU0ekW1m5M2CJCvrm/SFJ+XpfKmMdPXeZoXMVG8gouNRr5YtuVCNT9k+iyxs

UwjHGPZ3pxEqnhE/Zn+9EkhHf4efUbRau1KH9Iew6A
MLkx5xgofsjLC2qr7Actaqu5+xLjR0f7889al7STHCDVuuVZMFp3jKKbyBwlGYGiXCM0RE63HzRShzst

xFeg8hDWurYLX6V9lfwzpnqRWwySRxGBM0IKUZPhQBww1scjHB/fWMt0Q4IX5KH0JosEXFsrhylN3BhW

HcX55z4xFJ01zbkjNxH4RNyItmIUkMF9TMAD0zJZea
0odRUEB+RdusDI90alUIydm/4vUAKFDWJgmdo9VYc1weou138FJiGn+ipW1CnQbMXhgRItH35cE+Lk+y

PE9pPA2rMWxQ5CrVaANCMyk1QEXlQnLzTqupE7d7c/My3SIuMYodhghZkCuIRZ2rq2gZVEBq2oWR7yiA

MtZgpRJi0q5A5DngcuvtxsXvA72utRigj6LWc0KvIr
naaZ/xam8ZLykkrs15phbLIU0gGJg7TtLHSqh2flAH0u0zg9TIxQp//E0tpF15snglExfvMF1Be/Dzwl

pZOIOhsSpI19lyjuslRu6Ewwf/o4eku8cIKyPK9AfYNOeBKGkyIcQ0g7mUgoFCfV816AEGd5uL3onBwy

Jv9RFi4KZ2Y6Kio6h8g8ZUOb4DsO+nrV0806IXvwwE
VDh6y2SAKPFzxGsDvlJxJzLALp3Ikb/nd0YmL7UdjptoDceUmpGlUwMZ/Pq2JAO/ezAa4btR1E2PSX7Y

AiiKHARftTKOTo1RyPYAwSPX7efUR2IO4yIAwY+6GU2szJNXEVx9NwDRT1A3e3SdIKKCpmlnkRfBwISD

EGz01TK7v0UCrzdibgh3JZX0GmNvpiyuRd1AqGxIHi
WVierJbJpNxhQwqo9d9xFQuSCeLP0hSuGjXAZ9miR+L8Fyw6c2oilLnAI/TJ6LHCrtW613wpMapblSWQ

E4f+GEZ08Ka8Pm0RXysrOflQBsF47zPEGXij+V3BkhCc5IMpWTGjOP66bBERDvAOCSDEJhlafr2POpjE

vLt6S/qVKcYQLHgTVgQwZXzwK6mRVKTa9+hTTPBWGP
DUVWtpef/DptO4jPGfMQpmAPBoDfr+NEzLc9zLxRv5kWirLQNZIKigmYlbBa0t/DZjDic1Cwwltdn4uI

w/gwZLGOuPOETeP1cDqzNx7uCjrLFcZGEYlqUzEIotiSKIR43Vnsyt5HITagCh6LT+KmTFqqjoYf9SBY

yiQNYiRSglu6DJkuTKbjdksWIPgPoJkXV0XtCMCAxD
W2GRH6aGwip0fIG80PA90MwZ0/DCWgDPTn2QNEmlyGcmO1wWXlkvQJeot4miBg5cxmqUtgCBUz0v4ags

FDBgO5bVpiI2aXqchiBA1n03PTmh+JV7mxGwy/1hN4V82SbRjix5O6jftk3vP4gzQERcKN+NeN1pJZC6

KugZx3UJPozzvZrk/r0aCvvGBhEUFTqwvsjDRIYNkC
EH+Y4FU97I+4gdHn4tuh0sZN9R5R+C35KpZx5R8WeOZDCHXnkBs/1Ln8O6mHk4YJLxAeL6MKU0y0U5Tr

xRI7Yzj+iuNkBfKdQdKlSKsN0nJymspM9TIdKuUk9EhGUuM2FO24MyOQwmzbKF9Yht+wNbjLF7gxXbOF

qVjRPBV03CCmkTtoc177e13/iq2ss9lZFSF1C+mHEu
/iVnp9BahxbJjRpjJudh991Cna98yTqoAJkejTIPHDE3Omrj5FgYE2RDTyxV5ZClYcoXfOH2gAsJveTU

/ucdoGO+VwzviH3LNg3Zf+S5fb/ZlFHeL7cApDAXbnJBcFz151BeUrGFR4UwrWB/vyNKuet7jDfUloys

6AAw/ngISIpWv9ZQelQbIyRV6NMpF5ZvvKI0XFfTS4
Lz9QUr3NyjXs2uSL9omLkAgyLkAMKgbqNqOzqJU3brn3zXUcCbfQc/BYLdvenJMxMlKwOZsnn1ROELXr

UcWBYCjBWw0c7es/clcF/Q51HGXQ9vrFeZmhL8Mf9idQ19ptZYwCzPICuYpExxmyaz1hdjR238qx1IA/

nt9kdjeViKLiK2dygE8pME94Ea9+IWFGN6cVx1dY+p
uXipCaAOtXH3Ik09ayitkCYWhwaoPpVAHllR7Gl09WFDaZNBSjGk3nXXLwdSrhWk/CCiGxxxPX0Xi1DQ

qT87ZNu+f/wHPHaB7qzGCgJJr96Ndw50bP/Z8uWHIr+8I2B8clBlLRpa4JU0EzsHbmahD2PO8JJE3km6

9xDWR8uzkHv9zyoOd9ylY+wCWhumwcoOx2I10+mhhp
t3WosvVlDMvaYpR1Mm4zHswZj+Frz1s+jHyYTHbxjafCHDL/emTWmIucUJnpZDQDP3+SPtymcnIIPmFQ

SsidiSUgVlTaxc//WY5VmrRIyqlO2lefyDcAM/RJo7EXlscqXiuZJBDXy7G3kP1Bq63S1BnLure3s7+9

fq4sOp1VUYgxBbJKdgJFfI+AOZ1hCkly9Mn55reZdN
F8ZmQkbhrG7bcAo2KkRDglrdqLuMBzIg7UA+2KCR9AcZi49++rBMU08728pgxA/JCn1/V56EV4F3Ojp9

uZR8qvYIIhPi1ujDInQGZUaEXYMX7IRm1c+Ue3NvnlkjD9x7OPqwRKnulzPcCiyLv9OTZ45+buMJpe5Q

RMW1y3NuwyKuxJauhAr9rVdVyOP7QHejgN1j9RSt56
wvCh/zPN9o3TMqlg73/K9BvL7KqRqSF3ApOeA9Ym5JGRJPBuL3/LQM1N/JHkgR8ehQLnSon+jMSX6+vu

0xTwr2Sk7zL4pK0mMCiF/qNnupirws5VbjxDT6lUok6aq3PABVnYpmWpn2efL7TJV5CaqAX7UJDgYuUP

gV0kA2ftHKmLHNjBFd0gU95nsO0B/rqJ5X+MMAdkpO
RdyKOPmSrVIl7pIm7WTFlLt9Idg/TNNd8naEn9bCnzQLzPkc3mt0zOdMaCrdSTZDN1HvLpn16QiFnGBO

C6pVcBdZIvPk8v69LqeREYMuLz0yfpWc1OLYxv6OS7cB6I/FeRcw3al0okJlBX8txTsBsysZhlukz79k

VhveRIHqiSV9BlD/Kn0Iidfpwl5Ziv6RTOojX/UbGd
C+e1ycx9x6YorY79NAiQcj8wV1KSDjd6LbhUgxt79xBq2Z4bUERfR02PlOcvGIE5RgsGaM740tNRLFCC

RgO6UAoGw75CYp1Mu18Dbnx146V8aauPah7vM6soearZ6z2b2eD8sal4cVgNHwAhdF5u9vc+rLcIGQON

QUXeCcJHKRl2t50J5m5pXK2lnJ+ZmpL7xentxSOy6M
odVPct8lZKkrWlhp0ZM4u3XDfKpeJqoYHhRfJAJCQ0NPi37OFNjG7y0xGlVAyexTWkEtolpVXAbDV02u

E7Tmr+2GNVxQNYi+X6xu7duA5bCXyxDxK1RGZd81gLl8K7mZWHjTmknPdKRzjB3C+RmaAz+5x8raPtVO

iNXn1/+C0QgfvI+m4h1p7jctvvpiAmxOoWYilfVRTd
TS1Ys0muC31zduuDJCpvILPDeTk8sJOb0+aDgiopqPx4FdQTn9prQXHzX8gFjFTAkwWfZ4ACzhdXJzE2

bvUHwGHuYcZndUnAdRSu8yZV4WdChDcgGIqfInO9ia6ypqwA9qqv6oEdjrRssNn3fDKfFWYziqy6b6L1

aJXIEb8//zGrraBHrT0xPT1qXHqGm7VLw0c1c40jHn
tVkwCszEtDi8jBgt1fU7041CMakDrpi32M1+M7GF1N9m2Bc4DT7+YuwoP6zhIx7grOeQqMYqDHPVKQnz

AznYCoh1aStz13IL1l+LY/ihYZjUAnBl7FFbYNFSIpf0BGBoyRaMPk6NPzq2L6cnD6JsTuNj1VES++aR

AfrK02spwqme6SmJagMwQRf7uTPR/Ep/75CDCxDj9U
pR3GbxaXhAh7SrRUcxnPgSgkmPmbLoFmwhfevEuX57Ny776SmCdjNFj41Sip9lsogYWgZJc7Urn9SKfW

q3KjqpgxnDPgbCxs/jRRrfs6Dsxw3F9ZnwS35lw2lApPA+hJ/uu9Rga6NKEq0zLZPUdv4UkqGynbhJHm

kxyeeJfRuw8+50KBMVC3AWcd/iBCioorJ3Z1sVN0pF
3iZqTX1TN0y7U0kb5U/2bl7DWmmRYqaJF38Ai4aCiVSii26G9+L1cB21XGF7WEQYd0BjsgsWDQIrYJf2

UyvLZF+SQu7i71kzihH8sSWRPB5OVfj+poEAg1ZSWDT4zLf50R4pbo6TT+Ospo5qG9gN/qzdsPj0FBNW

KjFbxYdTJjWERb3ssW/dIwlJTPwOmlwZtFwHyG8aiP
W+4hPmi0ut22VVn2POT4W9RpbidV73GddiFyySGzPQVQiT570zKwX3usBtyQdYEEgKgo6ufps8dCCmA1

sLcKM4J0bLbwkN8d9r8OG+E0jewdIfaxIGWyyOx7zyEDtfeauKmvRZamc9yoY6YUvED2pSVB1m1iol36

fyj2NOERjd7Zh3fCdYaza5NyeEiZlPYV7Iz0rt+Nqx
xnDRsI9X2nyKUN5wNje0+DjhDkTt5lZ4UkUdwsJ5OnvUbHJ/alzw0wBurUgn9VrKGM/llvt2EKPKy58b

bU73p93UoFxeCNeWABKo06ALM/yljHLAZStqVkGzMRuRFrNtMFS/grS5GH/Wq9CIdZj87wR1Fp1uDqXG

ka9CbNmFORxxXETv+VJUvkoPCuLM/sZaB6QKlZjasd
CN+ijej8ZTSSmnza5cRnbzyTeX90xQukgtgxzVj6uWOxFrhqA5uOzkUrxIo4JeDWs5++BceW6Qz6KI4v

p8bYEvrQEQZL7INb5WL8Ju56+PlUSh2zsGmQBV/QsKKO1wv1qSkJI8B4ul7n3Vg1VS5Tpu4kqT7nAcQ6

rvzKMzrxHNeTg40Fs+8AfO5HlX7sXRer0gDShTCPH1
XA9ZPvokFZ7WrkeW0EwMdJsHuV5bRPWmHvvjuS4DQO4FvOOk2GXT6sgbRtm0tUOEU1bCN2CXwKdfu5At

kkW1z5D27inqDNJTtapbMmHV3uuJQFEXsOeZYLDK2JUVuMAgluUIwA6Cvf6rlIuZvBvKQGUfVCNA6/WL

48sceUgzD1Pk5Zy8JEtNPcnboCTC15KIrfKjtFQsr5
QoIGzAnS+xrMEfjBcmfjb3MCprbA+drESNfAjs0mkQzYOFGyOBHFuvatfzH083q05TWBErxmnsanavAH

0CBiBRnF2taquFSQXzQ9Mt/LLxMLjT9zKkeSWBFAw+CSLqHKobtKcLIvUegENGB0uezIhyVnPZKcTIOg

h4o9cycIfAzw16zOe64JAjPgnlhKQZnZj3p3nP5bg1
rItc+F3KGbyqYoXjG7eMldlISXiUchKjyiMx8x0XhvD8i9Mve2N/Tr+cBpF9VUpJ/8PX79LAvMnhtgxS

JLCQ6uYx1nRe58KOOJgJCyAISdsWWAAZzUTldDjvtgpVV+G2uSfoeuxVK32/BdcQdtIRJWEOM8JiKbHd

K0X0POoLWEsvDvGoJ24wYuKg4902RrWdv46RF/99LA
Kr9ysjtT7Tb9VEMPD9YD8uf14CSMDsWdXHmTVq6zWgdKLM6NC2sNmfVNaVmF78yQ66LBuBGN1VGGxLmu

EZz51xQi3E682iHZoN5wHwnLyJkx7hpGxpjmn26ACSVX4BmNC1SCizIAbODdXZJYXj//n2ZLqpTqL8X4

CS1K6DvPu56n7xEj2uyEfeH80hrOGG8ddHdv+YkxBO
VYNZ7ZveP0QLanLj5Yn29YCvfrIeDYY0IkIV62r8We76dOuN7oqG3dJqXBwFdw9m4gN1Q7BrQ0fWnqPm

4mlaR1cM7g3nfnDTeEEKYrj+2pUzjs2g66dsWxxZ8GQULZWeHnwM85tnz5Xfsns3eaDWWBUzSaymBdo8

yUTrpMF7A9jzUE8BwH6MJfHFaqKyQwPiKkaVgFKrtD
QgAmk2ZlAUK8emImXquZZthP0rNV6KLaQSdwRsl9XuMZC8gDhYgNLoxpsiCAGM4IhRS+YQ8r+Wd0egRm

oFkSGQkxa7lIupRUeLNs92Uz8HakweSk/CYpsZejhpDlde/ffMcDB9fKb8F4oj1xfZdtW4v1T55cwDwc

RIJDq3LDzJdSo9R+0j222QJXqrCgkOSAs51Cj70+ip
t+krQ5yqhROJVYQx0ABCFZjtyRsXqluhePW6M1CDx1uTZZK2qFJ8D4kO7WyH144IJXKpvgdHH7SzWBJC

W7GxLhcn+TKI+esoZQ0+wMpNCHkUiRgwOq2KhGnJlCv0ZeozJhb5+ZYSM4GB+HAEX0NPyjEHqOsr60wV

9a7BiE/XxrtRhlv2eZd1Sz1y8F0ImCUOtoyjFzwLOw
DTid20jbewuQWieCxzDd53EIOb+RYHwdbB8M4i7UhmkZXEu1kCgsrotdEkWFzxgB2KQrme46lR5+2VDi

zuVT7A2Q6QEbxOWGwwXqzdCz+bbJURdXaz8h5KBPyaOWtR/58NQGs/aV/ZaQGN4hGUbCC8SYC9VWDg4L

szwKgi1wi41kFkD3ZkuOYBx3fY8I3Mh+VrpWYKSMk/
s0qd5DCaRFC2D0P2zQt9q8hqu3duE4X7R2jlg2QkGa6rRqFc+79nkt/7pEppYy2yboSnGX0MeesKdpqi

CZURTzg+O6E/SJzR10Rh/gG8YlornJWONarnUfbhJVhVwpzeoEZmByYE6vkL6CZAENfFJ3fCJfGOc8R/

K75Cr68l1JfuJxqv5cNdbIy2gtZvQgtfr+yPZJlVp1
52DdocFlkq78nItTH4VL7FJzXHpY4pZselpJTfJWYPnRmd1PDwsA8c546j85mCfmlegZNmeZ/n4QZ5XI

hokoaWDqIMbFrjOUyl7rvRoIrkfx39p19o7muSWbFEP7Fy3+dxGWtQa7A0TksBxMRGOY/2xg0z8XsXX+

h0AmGIY1tyHQ0PPuzlsaXYU+N1kpyt/Sp/IXkOQxaT
jbsWaq1IyAtABRAjBrdSCkubbjb/kLfRv61XT6HQSQPNe1Qyd3454V5iR/UfF9ex9QObyDlxtiVHphhW

63r8BXmtBXySFqvf/+vmPDgh8XHJFN2N0VSNgKvBgc39t55EJOh0qktv1n4+FpaiOdloY6wZXAYJvfwZ

P4pa+SUZCh9nItkPFzbW+VLPjiugps2+5wE9bWnkOj
7XD/Zma+sNd4Jg8haTSW655UrKYENBqIk8W2haMUrRhEGuaEuBLoyjQeSUZeMnoOdsO80tEStUAnUqFr

pBbYtYeMZah2EaMA4d+q7m2QcJdvW/ntDFqrbzpIqWljz9wjIt96vXQkJ5aIj9K3g+r9Agd6eSMDif9X

nh4I4JkNw1BYD65R3KCDEfHQXSUfEid4P8mt6cgNsB
yQdrXy6q0LF1sGXRyhUlXHz6108ThfiHyZMSWlyyoAqk6Ka2gm2O86uG98rRFssf/4pxC+mzWC+6OFDb

SWLyHNbMk2WQUDIoCEggjkdiuOg8l0Lj2aPsYnfSBBMXEZUWlBN/rqV231Wbehbe3X1w709Zq4Auc2rR

jeMw00U+sF3kHmdEAzXL3ive8Cb5Cqw+NTA96/IuLt
Pv4yFf/wGqQZrBAGZYSB5rtwfQM4Q2VvZK6zewH6DsayMI1IOqQdGvp7rBmFkHjaqeCrlUbkbm0dIkPE

xwbRn8ijBDBLtTWTFbkdEPqf5r25JXNw9BYyq/DarkD+AkYRngPGu6CtSOqZija30/xmZB7AFsH01Rdm

gR+IvqXpGEnmJLmS4+cvHD6uDTyrb9AmcjxceEegI2
8ciZ9zCRvqRRL8aQFAb3jYVykhCp2ZIf7loSX/lbohHEzY9O32Im8BR725tg7ZoIK+4SEhdbPD2EvL4O

QHbS3plpnNHv/0KY4+ig9Qhfb4KvV5DjUeoNgR1iWbg78uh1v7G17hQe4X0fOTbuJBfaUAZrE8h/sqH3

NnLBgorRKReCk52/npgDf52p6GcCM88pOaVuEkLahC
XsDFNs4y0uDrz1hTezmZA/n+51nJU+k/lJnE+xhitoXh9DqoqcSSMFsJiGbB7BUwEXx6J7fQS+ookE5p

G4hReD4KJ6Lz/hobTY4PmVjk+8hRJrfTdqEU90nAuoXNyGXQ1GYBfXmZbpV+lqo2pMd1oSwl3iByiaRy

/wRRmB/6XfbLDF3DEJ6YaGjz1UfWPhrISo8hyQsVSe
qrbid6/pAwdjPJub0qb8J1R9yA17rccQqydMgvfqwje2r3M6Y6IAGxznWKZ99qg6IifSTabwpuFT+tOy

2Rn9WqG53GWBb1eX+aA33FQ9h7OwQyhRqKwPz4qqg4YybTrduIMBJNwc1gNqNJz/q/dJyaGGMI1Prloo

rYnvEmcxartdLHThOxTehml7BFfa3/V8fsxCZWJM8L
X/C133R8yBWuhzvvYzFgHukVNueuX2TsFQjTXxnoDh6ucJ60wkDjQKaPWsVyJWgfvNhMzVvt1iW7w/b2

N+JAHLZpPOTQVsxXp20bwh4iZzwJ+IAH9XJCUMVE7CtRk09SlX7Ywn1tSLWuofYXQXHt8w8SUR9ZMM1K

sFP97zrjECuSo47g4ZFKe+tjSG5T0Mwt/w/LyQ/Jiq
Uysrmh/9ytcE+6mhdxGtriqyKAz//Mi0k2JuQzpxTjteCLpT/jQfXZt7OeP+bd01gG2bip++HeOlDMyU

W69EkKBMd1Yj12mMMi6N/zUgJm9Pju6jKmCK/WSy9ceGD1d+Y9/GakQniQIXvU7FIkcgvds3iggnIlVB

Og4S8/NtD+6uixlxm+Q/lyvze0D7K6h0g4+mOmUbVM
slV9qqnZuhw6zerzhpfLLXaJH31sfviAClmwGhbSMf8xiZGY7CKRzfF3WwbfoSluPD+McI4cho5HEFXK

ix5Y7EDF5S1+BJcT3Aae1vDkIN3CQ+46dCmI3oscRVcmrv2fuqcY39SdmWsQZw47GPo1Nj20rjG3Kb0o

WvAD3yovpan3D5kmBPgxsKrGMMWzLwf03ua2axlX7l
1wHzzoaxxNhIIJ2Wq1C5SN68/t/dNkyFVWx7Y/xwoFzNQ5EO4ZbFD1E6bgOC8A9JiVZnSG/zu/2Fg9pI

SYeZjKwU9G6NIbvrPZ6T0WKfLmOAZAteKhSq9oI1px+SStziDuk09j4DmuikBrOD0cM+QTbEa1HYaKBv

g5gXoPQf8WpJ3OlSvVqvLQ/tB2CxQji+VRABx3P9r8
ltlUBmJ126Pwb5i1ejizgtirFjQ93yPJs82C4gdBnroRyWncuSMFq8SBgK8wtXeRo9m0N+GRAj02+dfu

AlcfkSgPV0Qqbo55X+W7DzoDqXGlGmdh1jRkSrOs6R+AiDYqatrT2WSKDiGtPaF7Z0SbUG5+PpWk9wS5

SqaflXVBEKKpp6p4kg8XoTKm59Qj62liGAZnMOHjj2
oTxta1DHRWr0fSmHert20nXWvicMs1HRfLK8r3BO0IkLff23WEih74UhgF3O8nS8Dmz42X/6vDAmABsC

imiaJNfFCM0esI5/CBMcVPdNpAjacZv/ZPJ+mr0o9TPVRQoTO8B4Lopod1CoK9Ov8R4Rf3A58Wq9yHhK

VQke8CK+Xzkgk439/2A0V/IEuKFYwOBTUz4pLBDGyH
KmReng9AQMsq4alc0VlGrn29peG+8EHcKTKGMlvUPkVxdU5fJXKLHA4a2mrac0NaOoSD5OBBRStlN1gr

1A6uBlHZUn4HPdJazG1FJ34wvCPFoeD9P7Ss/6JMlbqI5CV9W6XY2K2n3A8+XDlcmp/lIkf78Po3pUQt

dQIKmRLzwFvBccLdCJNUkHFwInqc4Xa2wu1Bj1/gH7
b5EKpkcCgwel4bWKqspBG1Xp+092ULcILVUonkGdF5cxZ51kL7XpmGgAXrljmNvkCN+kubqdlPd/7+rH

o/BuPHZXgmarDaQtQ2sdhkBzrWqb4D8l2X8gDBktyOE/HRWRCJ/XeguxtL+M8/n5oslk1MfrsqcE9dQf

o52+H0ylW1Qyw4KqcVfv4pzTEkRRnvzhEree+cokP5
onbc9HqFchsRGqS5AS5U3JzbrJKD3RWQ0AtVotAJ0gIEctBzpSUuMEDw9ZZcQSY5dQgClcFl3QdN4xGT

1Lo9Tk4r/RFo21eqlTbA/U7kwvAG8P3PffTycKV0hkWpa5jQlpxgB1G2bCfkJkmkTwqmqUhaSgncu1I4

K+8lRGygtcoUuTWxx0qNyLucmPvkdX5zaDNKm3T/dr
bFhT2GkAw8u23zyCpkbqyZK1y/llMoMulQwIgcfA2mN8rXN/IuvmyeQDqQaU3yyvr2FCNVwVeOelsNLI

oOtV8P4rHzlNHk//EKRuRyyEaR8gzGvDdbLxHlLKbdeu4jeT5sSUTbUcDuaS9dPX2/kNigHNq1k/BpgN

tUnwkoXM/ij40rg+lPjYiyAy3h+TBEDot8APp15G3v
kviVrYVN0DZui+jsxzi7Gbiyy2SSVcz/PErfNa9v3rjvbCtbnQn51QH1XvPehE3uXV89bA33xrnzUVdb

4luBQBtaeIw7zbW12DnJ7Lw5rK2xz5j+5VvHtM9qlPuHN+nVtUFsafQKotgImkl/+QDGNWQxXVeTxf87

2MYN2NiTk2Wq2Keodu86t8njNEdovDL7zavK0HI2mw
YwWZ+6gpjsxorLf3h5IJQC20fv+9wjqrwLjqPxwnX73dRGp+cWCpCNWgGoRofah6Q37GMe0InEHRCaIV

cF+sFCPA8yHXENf0Ryd6Q7qUuwzQXIGYnvQ7HmVNZocw9p5MeNQ5jkagjJJwGx+ggUzlfI6f2iYSufr0

Iqr8tH8j9oOhgvQiXbLWw4kK3oKH7R8NeJfRVYrS17
xnFMg/LAJ8Q/QlGiY/dpSwsan+uEvr1gmW+juK5iywoiq4vrkXiNDZjp4bw8Zw97IYjRMvzkujDAIz+t

D8nnTN33iY+T5pzYt2SUF3E/pazPVUZRliX+CHdY3dcoUrZSKE4kUDYZtjQ2PDSHt3rxAqOO5aos/RyH

UpjX9JiatDX5h3gh19l25bB4gqnlgijG7fyEOqdOzT
xA+VL2sXbb1L6PZmH2heJeOZtkKol/ePzxt/lrF6AascewbuFKue9xrY+4np7iGF4Q9pCN3IBk6VqD9x

H8ALKX8DB3Vciv264pofmIirgjwKjo7lh+t6uISuzaUKOv1Gusw1uqnDa6gX1gAxu5i013ie6jmLseIz

Rfhu9BodjEDRtsE9FO7I6NnBWz3pY/z6gRPdPsK36z
OJtPDTcVjbFwQ6GcJkdR/fLI4wgBszin9iVgZ1KBRE16IFOPTNE/SYu8W0LfTnlhqYSpFYNwu1yLbvdq

Fb1HRzEmrbioV8Dsk9RljShrAarkwyu/jIreofGrc/K74dqOD3mAh7aSmiEKuOBgpAvQrx+i6fi7Wo8K

kTqsWk2KmTW2b6nviGvY1Ar48w5BQdOrhO+1+0Hykj
ZeG3538pUiH32N2T8OBSiH2+ppH2UzGyDVwM2R/wO7nmvXEZSz9f9VFDcBVE1itTQsw1NtUHMRxkUQUu

8eC5xs5w+HHDiaIGVM3yPXg53M5zaN3B6+u8jOjC3Dfdo7l1GoEVdTX9hnH5KJR8qdYuYngCpApTt32F

ATxn+tRXuOtkDoWhduFjJBj2VukhZQFyrkqSP7iWiq
JbQBj0h6QAr5fKouhBS2UKwtOZoa4TEtZ+swD03IFBmBtkpyNcwsNY67ImUgveCu3v5BhgzJ7yuAos8j

7mnmrNJ++hgaiqj3J99NO23/HvaAWUOmlz7hdxwt7wuHmz9Dlnyu9jZ72wr5orQptkVJcbYH8NLZL0QG

3ikTC+CIeL1ukfvIMMYwqbT738pEGXcZ1l+xLeY5lc
imTeGRd0SmEc0T/mw9m6dwymuxJc+XwhabiSet2ploNEr9In2uFncoNbrLrrZ/ehkv/6rJ0EYiapE5+B

0p147X4nla9QItFpnuN7XPQUoeFo20mzL3WUjhpMx3m2dbLI/Sc+zX8sWl/0zpCYbrqFtjN7Jd+Z5O1u

lfkkwyJwzFJWpyz1tGdD823ITVg22p7kLTVHZvuGrh
LW5IhFg1pjeHy1KmXcVrbbelAge+8yS7fR07s7tlH3hm5tQzl6+YlGn5QT1uQxgf7mhXUwaZKn5tPFPR

4/Q+AardowzojtY/Ydk0wfun6g3OemjJkRy/tswJPztX14KRupfM034N6vw1Fy8VqQ+/xhXF/ZRDcTzY

Oj7fCZrpgz8sgCBSXC6/yDUbsyExdx36GhCd7l8xM8
amW7O2CROZB8VWh2DY9vK+xLT6l+a9rvpaVV6mzB3mOe8Eqae8bEDnmmshd7cI4EA2cvbUH/hOffwL+N

tGH7qeOFZq5igdFzCrzAKu2fCs/wiHK+iRrWFYXROWx5GqB5adRaWlA7CugWeIjnoTrCDaoue1gpBG/r

x8JNi56SslT3RAno4uqQMW+hkrIxlfw8+GF4uk/+EI
YtXDjeYYY1nV+3399Z63/Dt0/yia1pD/VlkLicHdI9DcjAK0qE4cFeH/IeB/1pb/vcohlOq7OdRzhJ6B

SQPloAAlXoo2H1Cxu/HnlOMlyxe6B9nRHhyIvxCZsiDa/d9xEDkreWO1p//5nRAdfB8ZYODHwptNrNcy

Y6I0i45/6cfsJRWprdIPBhamQbH+JkFFDsbmoflGSt
5Vu2E/WTZ+9JEyjUonjVwSB/U/VBoQwA3K/KevGUTZCm6Y5nT247r65U6g1Q3nUPd2yConQ9xAn1vsEA

GZYf7+bINe28Od8FB8tu6KgR3o/OoGkqcOcpEV4lIbqW4uFrcal9A+oUny0A3mdoi46k4c7hlSppBc0P

mWFpM7IcarOhOZpxgxG1vtjFrN+nekB8Hvytw4uG+p
cnFEI8wcsWPvsTIVheIb+bs/Rs0xIGiVoA3uI4UZw/yGmeNqV+Q3KfHk6Rpj6w81OYg3FlPvfiOqZPvB

AEeP3UjZxGfCpbq2HaYoF6gNbC0Z34tv/0zIbFcH1l7KtH61MMq8Ta3bisQsH3KlalMc/HSAfXZqGOw/

nLP1GyoCbVZ/2cmphy5tpbHdf/3o6o/7UfnQE7QG1z
yZ9nV56aWJZL+BPmF9Smkyt5SPh0zWg6FcAo1Ghllj0IH4vE70gjXsqG3Rhn9Fe9zsSMg4QuZAvVhdEH

URnEQDfv+mgR95iM/TR8i9snRwn3J5naaknDP3XdqN2n5GT4c1K1v1+edVQ1wWRRa/PLCS2vx9XlK2f3

PsRdtH+zVevomuyfYhBSqffPfPwc3GA3SBrb0a5kJY
KHDJbSzJQuqsgn/tsLYigjH+433h8kiyXOuODDX3pFRay+hPLT3XC5FcdAWI/XiF6Sm7hzxBdbbt6SBF

EctKkK+rXma1XUsX5zhm2/TrvieTEtClHKTtSlCvrmPhonNMaiSh+mEveoahM9rknTbTWFim7vTplQpb

LfyzAb94/cSv796A7RvdNo1ngS+5KpJUL417dgT6Wu
0sBniCn77TQdg9p3aXglEWAOnfSuttoC+zJnF5/0yXIZWrFid8WmTS5UIAYy8KRJWevJv1U+XHQKibYD

jHIIgv2+9RvvWUVbbb4ZtlOnuTak6FdXF9ZUEHtZ8e+KXY2R81fpdROMZ1R81IRAx8XUsY6kc9prg3J5

qN27/MZ9mq2e3ZFkWXCtvb9xbn89pYibd546+r0QPh
EnlEvH8/ItW/s5oufibr9z02ti+tHCNQIojY9z8gQCwL5+Bb6FlVIq2cccpKk/5t9cMWINh+rd4+LRNQ

jXpUgBsd0sN006QGdCN5qQRTyzgugrRx4F5Wlup+Q/kf2+K9DrlX+VDAqA/q5eFr211CEaS3t1RAMz/z

FKhLZ6Eo1ex8E4H8LLnMEhTRD7qI5uASQsuLQejtQA
rSfnb5iLnI+f78jVn/uKK1d4qZzROx5wBV8jaSFrM99eh+7wlafRkNWPf/L5Le6VzVyyC8VgXh+DsFHZ

XlEeH+p2xwn4A/H4g5GW6s4Zqn53EErBmZMc8AjQzsQCd3ed8Xprk1W+Fq2v/wmY/VaFsQ8Do890841R

3bZNms8zpGY7kVK3hM7y7r+FIKY8wOfIuda2r1mTxK
m6yj1PdxNdvLMTHZ5vQosx6//HUYWZUHFkonSOi3F7L5vNSUzIBZOkUWrKatAZXYJN63fugEUOgDzO71

tfJmAKx9tIvqBtbEXoxywHDU9wvskVfMjBc/gRm1WjIlvJi9zkQV6ULCCmltZ/pp1XApBfIDxh6SxdUI

lI/Lz3En6jZASbcD/K5SSwwEv1Y+pJmnYvFbfe80Td
xW4EzRfb0P2L8G2tO34HEA3hoKuh3d/1O3HQUcGBCwXS9giBuCO17Xz4yfjuwhsrxh31oCRZZ/xYtbon

uifVEbjwFvDSJGk+6po/dM5pTdHp3vkBmuj72orw/j+tNn8+HuPnB8Fs2FHjdJZX1SVsDQJO0NEzyFAM

S+f/XaIChwg8t+xIwAq3Q7j1QOr6Bx34xWRpcqMmqV
UBvleufiv5XM+94nH2ayqi59NHUuznCB44LXEAQyVHGgEkKCLWeb8OSkR/amPjx0WjTKuSsxpboPr22j

hBJRP9F/wgfE78G46pBuTxt57YWF4CeQ9HgbnV1GKGK0jPMhpugOXASxTyh7W9zqyqOoPwC7DBcX89bX

EMVQEvWtGQXzgMHUixI6k5RJnOqLt6y3F2QmsMkqKy
s/Ubv2kY+Wgw38+m4iWL+/HswuiexNnH1VLTXf8iXUPM2sM5XX3PjYH2xd1gq3EXFjwypvcAPS8cFEtZ

lD22WPJqsMWm5/1lEYXwKK1nJbDNBatj/5Bi3jjOP5gVgySdsCSZIbSexe+kgQCZfH6/umiRG/KEF5Lt

DSA3J0piKqGy1HfQqc7H3TFBGL79vTNDdvr5toeaTi
lFsOsc5czHY1g4osluGrw5rFV3cb4xZCm47LPVnPtrO6LSsysJWW0Aq913iH507DXbdJ0U6O3DRXlWe+

KH26habZppJb8ODIpG866k3+C8j5qzh+o+hTQkpmBOhzZW8Q9smkzXGZg03tihMociS52KRO+liLE6pY

0HTr/DUnYskuLItBXr/OAuTc56O9O/5h/3quu/5btG
Wo9rmNhmN/yvIo2+PoqYaUHP4P3KdATLbn4IwwEqV//hw7bDqPyVE2w1w4fWS4sOnLTGQShpFpj7z109

r7FPXOxPcZGuBZux/QtE5oIFYjLef+D1UlafGjyy5apgIhIgb43YX1YPHELUrKKbhAg5URGqX4HotIcK

WRhPYI0ySrpkVMea+vMEigma2UxodapXI8SKrJgjho
wxdBYEGDJWRD7qEJ4egI1w7nyz9pCcHaXcWe7AZKQCLxNWh49v4pIIKTaKs9HHVmvBJyyXBs3EKKFwVB

Ud43hFe5ZYucWrrteQAbu3vbRtvcYKlb3fBqu6ZItyophxHKBRyV+XzVoh8wGwuog1FS8vEU2VNSjPSI

G0rAgnR6/4lfMr4H3Afwo+gSZAkaSEhwCRAITnAnOC
A5D1Mw2y6iYFDYG28hjZfYx+BOBds5C3s18Ui1GQ0/5U9sdvu9Sk8vl3q3o8mySXOqER+w/TnW5okqxG

3Dksy+2oKR1rFdoIs0mfkXIK/+wdAcm8gOReB+3w20MK5zHKpqtxxxoiegCGVIDWzlHl7bXR5dfwebmn

qspHAwjsY2Zz2Hqg3gLGHZKnkq4TBW7H4r7yf4TenN
kMXQ3e+R7DK7aQ1L/V/NB0Z08dp899e8585bSJBzyLwBCVxfmUMzS9sksGQJJutwcsy9opg2cy+JuQ/f

sdv2h4CHhC9ztzefXhLYUxrKnQa1Gpa8M1G1uwtBUCnqaHPXvGsx0GOqYv8zoc3snoT3ns7WFm5LhFgB

eQWjmag9jY8T0EyutPE7/Kodp0lGpjQ3Hyhf3vttHz
/WR6fbbQ9/2kLaatS5F1KV4Hw5L2a9yfD03MJOWbOpYXB88ftg11Uow10jLqonr5UsXtsdyo4gGbRW4Q

jOOLQuMJU2Q0EJqKwunR75ioqBiLc8UUOW8vEyp1BwRZOwwS1ojagAhOeaDvsqf+1m3jBypeBvlo9pNK

3miuHRxithRbh+aau+1a4ZLT3FDMeA7Ikof0s8WMSY
UchX/Dbr6mYFobMTsUvVaeN5IlBUnb9yGOWV+X81nEFTf0gsgkzCMfLZsCFWU3XDIzH7bcMZFLirrt+W

SGjN/DSgv0R6S1h4n0x3TvHPXcSACFyeJXz+Rux19BbqQ19lyitkjukHsN18xzFGNfy7TsTaC4mHo51X

YPNWAm0GWOvnK8Yz+dbUPbpBEJf+TxCZTsNmHHo4Cl
JgfiGwphVoyH25EG0Eff5ukHa6fcTd/K9vn7gL8r5k1M+zp6BMKsM3FB4WN3HJAZcXzWWD5hPyR8H7k2

RYdgdeVUFR9WHfqTGIUT5hKDGfQl0ov2XAoKptOtrWWfCreRZN9dZvGE8U+Ng2DqpR+Of/Lx8s/b8d1P

ymc4+Ex+gh0OijD+Lzyzy5Nmd69G8NXXCANIrht1qC
kvYXtkwTWy+MFRxYHYyajsj/gn1GDsziXN8qbbunAA1N9jhZ6LCq2LaTnCvvBmDO3fM0Kx/vIDm+qE+R

5qO9yfCJF6hgWR8ogkbtKkYnPnwmA6rgBbXBfDDcy+zSTczMBTEtlVw+ZuXQMpHnyTjtP2ZK6wr+8nbl

J2MSzbeQZ3gLYvs+YYgK5hlJV/21HLOkRQ4I+90QWJ
SWpUpvw+IeGj8JxOG96yAB/TKdEzagM9uS6vK0asUkm706ojUqOk7MdGvr0t52QHs2gKuq9sitqg68gS

HCScLs1m3RSJacmWXHt579CjPzJz/UTpvcDV3o+1Tsw+uqsOff6oF/9e4dqntZUxr5k8qL1sueVqnVqz

Zw5AzFbyG2rLLYyfDtPc8+QfnOBBAaldV3+oCmJD3U
Jr+jXzZAR3Zkfl27cE74HFhPtQg0chLVXnP9L166tXN+AsBqM2rQ/FHptJ+SQL+o5E22VhEW7AAUJbwx

8h6dOUaLmGpXO+WtRpZxmOyjJTGxyWAnPuXEmRHTi4jvoFQDNRGdO6gW0LAqppbcMhxtsKaMlYTwd9HL

EzaddKYPWvDEtDP+vfLVJKeiMb7Y2WZtYEsgdN8CzS
qZMwhcynPVaVJ16dUj9n9hBgIIiwSeCuYfb+NTl2UXUXGxfLrsg4ljYzAfh7Q/8wjRn7pRM+1PPaavwY

RR/nmJqk6Ip6NfskXtkd44smO8GUcK0ayh1VvXr59sTwwQhKvAz6yKHDRXNA7jvSaYyKpR54EGF1QG3c

NjVIFmmKIFXaweo6zbghuulwNE5bC09gW7D3Y9TvKv
ORA6My85jIhuSDW/Pjqwoa/xyzKkP0tj440wrdIZcNcHCpv8KrUtZIhdoBctfFZf39lyyBku3bOCIBjO

QOXcvFHd25hQJgj2MBp/n//j6ZW1EUVbwXkguuTh452kR3ezBncDHp8PRZI4YunXblk6IAvM+6s7/a9D

dF3LYVHbkxCGzTsaSl4cnLEYGIprMfpdg3hxZ2s931
7VqlhyDCxwTNCpEuLJImgKPUkPbN2DTW8QLM6nP6l7kkyGmcaDfPx3G5DR8ErnUKotLyJhPPo59i2Npf

jUvpqsCNeM01c4dsRIt7xwgatcCUh1aF+PkVcAg1E6mpieZBe9LtgP4ZyEcstYx5aNJBmPxG2qVO2HOR

CViCTO2Qog9qEel91epEr5ygffmkRlClpXipvgNBzx
Pht7LyKZx9G7Zdb9d04L9XPiIvsCGzuimvSD3KpAssScFhRjIkVe3iGNfvm6AIArlSSz0YB7W8441HIk

HwQwtjJSZpE4agKol0Xw6yQx3p8ERiR4nroNxesF4ZuWPYedngO/lQQq5KcGcLmFBIzNft0qxif2Teo7

GtJzYyZYVOMN0ExBywe3LF3RujX9UWmN99xeATeohz
ZI+VyZfvk1aqZivtiue30exB01qO7ybar/jm+z9D+Hnx837ALLW8/0J8gGXqcmOpyjfSexS0vGPEKR1W

M0GHRZQY89mPNVvmXLXMJlFk5mb2JFTowAm83ho1EEBOdv/KOiWvT3eV4gqp8LroXww/koqHOTyO0QXh

svJD/M6BJzyxLGS239KDI21FAat2vRTp4Rg40HyoBN
/FNr0QlW6LMRguP7gaCyfJZsso4O2KpGCl+22D017ylSzhFX2dHU1vPsxG4Cps7heVHopMn2P7u823rV

JzTY/AfPY9KIoVkFmWQLNEeh0zM2a4pqwmDmQP5S1HDPuGW/CdNHTJE4HchxsyLC9ryzPO0PrfuU7bcl

BBIgX9aYSdgeiiGlRAuc/vDYnTx0D3q0m74I2/0nQ/
n48qKOd57EJXg4o06Fd8ME00pLHDWkZ3X8hXuvP/rqHnEH3QlI5JgHU1asRn80o3ctSsPje970peSS7V

P1u99aANmO3J4CXdrl+YRClm3R35CIy62/JvEtD+lvV7MZIDSRtG58aeqIbK6o+B8oHnWoshA1yu17ia

hr/lugnQ25s+aFc7uZtRgrMKtb91WOw6t0wtHJCsaH
aJ0tNprzkd6MEk7AF4zgQGj7qdXm9R86zswdvmRTFcjuu6jpuZ7aeEot/53teVV4gGHfXiajiK1GBO4d

UOLCwDnyxN83XcfgfRo9Bjzni7pUnoftiGHao/5Jfy6AI+KYeQymoUpxWUNLyNpKODpiw4TYqS86CSpU

9WL8gL7yFgEOCsFNJQrFT8unKpolaZ43YEE4j0ncjP
OboBzTlBt4jz/ilemwgwbGdDWr0v5yA29pAHgwgStB+qtTEZuMlCWFCU3JToG6fVf0x+GN+fG0WFvwdp

t2a4c6Lo+uBF00aFo4OdemKoZ6q+BijL5kxKYL+BAyjv+WV1BB3DYclzpqPEjyuVtaFjWAAyBW645shr

QGiuE1uw++2zrWC1B/ONGeD2utNhgeTNxVWiV0uklX
7QjS/y7tTaI9cKTF1kFF6JsJxqysgrHvQQslJ+wYqZXptbZU+dL+ehvC/UirFaVX/u0mwE6xiXCRAqa+

qli4ZOMiuL0wGH1weII571tdSLIQrFqUS9DkdyCPIYqOqySO/Z9NVtOwddWPm2J6o28nCRtzmIGwIq1m

u/dWf+3jKOpcjUmWGSpEDv+W3VWv7WWWFmi4VzETwr
CI4YFHRx+NPxSvBHDJbSqvZx67IJLjAIbQOmt/kuJg/Q4WGJL/fQSBaF3CFQuDsxzqW5QIKTUOfOnVai

uD9ghSxJBZ/OFX9m10m9usjMrljYpep4V6y3erQFU4ywoebI86dcy+5Z+ktlEu/qwdBAi3Q2tvoR8vX7

WyHWsoma/nLoZgo03ncJ3eEFETVpoUfnE/LdYMhp5x
RVK25TaQ2rBYJHP3+JMj2zUkKj2igJTzSaquTrnGAi72XesOIE6eRNXD+MqgR4jpHx+1gn+i341/UQpU

kYBT4M6O3lUmQ5C/6JRMtA+rukdorrLADZQ1UpigzrPWrVQwwMoBMjMAofbBXFjtFdCOn39Vu36Q20hI

75pyy5VwaCaxa93aMfYw/NBsqwMyI8dI8brWn408h0
ZKA0OnWy+Sodc9iO/QK/qXpXCZIjmob+jRyWnyjMHacVURZUjR6FI2R9yrFHx1/hhk7PmUVqUshXWbPb

DAWyPyeoXx3dyBSzrqfgJ/rsvHWQ40MBiIDSocrs1risQXlb/Xhy2M/FZJkBsslDFcd1+7iRIZRX7Qhx

41mSQcHLlZtXNzus9aFPVAuE7+CWw547ZzQpXcVDf3
jLtE8krI6g23Lwi6i1NuiRsh1CEXYVtN4v8WwJkcG5dbcFsFKqp8d0cieZr2EcfajlmHDy+AOGVnbboX

qGX/IOewJXEht4+u8cQkjA+O/hICCD5wBB1hOl9nxDsmhDBQO/MBfCcE6o/gqkTc+t3JpUx1ON7pAcfi

BkwtyG7w/z677EyI6vj2yzcEBwFBCyo65tYcrDaE3i
vOeH4QbRIglI1bJeZVcNS6kfzWyJ65oac8Mc4gllQX31hBGpKqtxETEsuz4h1IjnL/+8Mo6htrN1IDkb

gmVCcG2TlUmoVqsqmH8ecCnI6RFz9yMJnsmbck6Upba/aBxQfNEqQFGK/fze4UJgSdhvncN9OV6f4H03

qaCh+ANIyf349HGqtXsqOc6cqp6IzgoNrHJZpbIx2n
86i42sQa4WJsy8OeolkhJJ30zDz5tCPJQNVPX5Tu+71IGnNxA8za7GwSzOCUSH8GelAPe35ut20Dse5m

PscV0Rxr8ooEMyg0M29i0MyC0vzjuqEPJXpDe8FdM5gLnAcA7D2/8nSRxcdZ77Yf4Cdb1F/D4OXEduz8

tMeihA3N3rFM8O8xgZuqU0mso7rOngGVsNhZO2mce5
j/dHq9LrljesXTk+z1Hgcrv0eqevlWX1XHhG7mm0mWKKZV5QcyA+5HpTl1+exZtSepSJLPaiXkgC/J4t

QmXLM56j/9Ief9PDcO2VqcYOqMrMLWsHqBzLpgW8+0GnbgsKzG0xktDxrs5i44+blWt7CXCjsmYFNI7G

hN3yAphyq1ByM6uTa1ZSEQts30KC/LTNI2ZRgJTsKR
2EaK4Qo2jxabkpxPoAC0CVNxjyF81PthZ1w70C4VraDemdvCXcSuSliQoTQ91GpCPvYbvP+9p4cGQs4a

W3YRQAm8wjBr4mh4H+Zpo/Np9YMFDUk1E42oKdmTrcHL4AbFHiT60QC+I4ZjCVwB8hGlVJ6YeujuxX6Y

Sx2POdehoXoe+4+GDOo8LAQWoet93XGdQ54pCrusRs
z4xiwqbFjbLfTJSEAP0RPlThxZ+Ub8tseuZHJ2laJomgwv0VeiNQU8kw6REXAUvKet+l9DBnPZ0D0sDe

YtkK+YnbTaKrAddNfUfcK4o+Dq3Hk8z8EQnCNfnabnaxblL/eopqWDfr8hL6oRpcUZMBuCgY8/gpS5wo

PPI+WAZ388KW2D/d9nGEQVlvsfwMP43+kvCIuQ+0Q+
FxQEAfdgn84hacfGlwLpE1ElUoZo7u3Eg/MVYTT0SITr1mDr5G/TWQLA4Eflg4YIbwd9REj1nEdl5+2B

MddnMhcnS+LGSolM6fdAsmmvvBGanGh61YSt2LSBAGikz55ezJx4x+woqlPdCRQ9D2psBfMkW2H6M+hO

8AKhoxfg5KoUCW9IlWSjyNtHeKjzdT0PEd/sz8FM0H
D+/fWgkUapsU3sUhZ6TxYo64/JFMIoTBz7/8k9cP5/OQTUJztYATiXln4fFJWO1jERZNEWCoeb268sCE

T1Rqvte1d27x5v7HAqn2wEcsrmeSHUTVzS69/hHe/rX1KfTSQr6VCCec45lfc0u2sUgfEzmIs+u514wN

hLWJQrCiR/kZQfQKiWtIs5dGjoRCNq7w+6HqKddJFs
M2DhISVXRseK0EycFOtQnopx6RuBg/0soQ7gLutp4QeSfjgHWx17JCk1dAgk5+0P06mfrIUA1VgYyX3Q

M4vCRSLRi/WZwk2/8H7I1z62XoTcbG1lA+r29hQO8Bw8Q3m9webPleTBC9IjnAYYTKB5IJUxeeun5816

hYzjfpFSV+LDXhXIEG1c3ol4hrEd9OnsWh+y1Fmu3Q
ew1bcb0LBArTIsnOlKlyKH+0xrxyH6et18Dqd2el8VBPXgeOKVvCUP3SKEK4ufOlWheAs2Iek3VU/iwV

0Dhw1R37Qb8WB0s1jUrScZbwwv5+o78OZXaaD2vYISjvO+Wcn4t0iUr+an7gVTTCdbnzcI1u86EVdsEY

Y31WtULJBgh5TRuqc00fOc+fLqrQCObiXXW45daeSU
K4J+YO9bKLG4dT6e7Zyr3UWxIiWbphH/Hm34cnt+k91nE1DKhkgLQd9yIXukNf2hM4OiUlRo+2X/+hmA

k0a5aWXI13RhvkiKjhCx1FXdhDgDbZMCdTVv9E4216AUsT+njCFbbgN5+x5ifxsAwOR0lFhH6aAOfU2m

Pk6TWTsYLB/R/3OuezO3HCG4fclmDv5GnviWWYVra0
wcuXRdRk0uQU2cq8E41SENMCGuLV5BUtx/GUuBjMIGezusr1t3FvVa4Q/9lLem8cM5AlxcnhD+R+rart

b4zYwEkXwqvRFKVUK8X4JgAwhmAlxMZSfcow+P3UXQqsQBzwpTkJeincy9PfwLebrF3sqDuomI08tdGH

HaHDMj+RUWjzLuTiEvYmcZK+1o0k6oFMcRvT5H/fKf
FBn+eCe5eiF3u4f/S+e20o4d/vQyrHRLbx4UuWVMf+nyiHzOD4d2QOtfzgZQ4PsYsgjDdN/0O06fFOh2

cy+U+G/ZhhMD38Ya1fy/uP+lfY8N3CIZzcTMMXa+kJLPxblSGE4KUsD9nd0hzC03JAb0z36JnpNksC74

L6Bkg9VORtf5b3b+H7ja0D6JovhLdl8HVhyQko70W/
7FOI7dnwTKSrSbQr69/JUjPeDNx+ezkfvmvE5WF44vHd7Jw5SUnWiZB6CBCqDpnrxX+b9UIYDKcye/bt

DnsUIZK0PfXEERh4IKK3vhEjhUjP6wjcYS9lCEyfXnQ+UmdHmpzKZ95fflolkxVQw1mSHkxZGTypXIYE

QIderrBzSzBfXojlWHfCar3Z3H1yYwQToqU0GwFZSH
P/2SdJ9HieI4jC2wy7idUQ34FGPaj5ai/EcM9UbTEVp7Xyl/p42LnWqLuNb0KJNWMIyrB0OMv7ETXkxF

du3dsSDtAjecfnESPWnnB4CKwIYRjLD8GYNAtL+DimL3Jf2iIs5e8QGtoj5tvK1xBqQrULUQYuAeq5Ej

dD6d/11ZvR4WwtZj2Xco7fMKruKcyNuXvV3rDidUeT
fcmVpfeezcCoG9ZXIwKd45jYEcPakuUwCgj2EMx2ptYy0CzFEigeTfhmlhMA+sMe3ags7GzTH/jMZTPU

Mcsch89U5QVmzXbDSjSqIMhqz6EMKZVXQMBXlYN0IPrAvueyl2kx2YgZmfmf2oc+lkzUT1SXMe0yHTDu

+qrLgmQwYJvFrXeU8PR/M+QWLo/31B6s1TUQO5vzEb
b3r8Aj+3OPr4CudkPOQoSnh4AEKx22ZmLnOzo1sr6uMD8CzKmy7jxlGYZJWNxDZxf13qnPwpa0Um7kDR

qRpKrn5d/GsPJAwqGDGhJzCm4ZfRZfUPfXL8vd5TDmCZjlmkw4oWUpfHfs7zce7WxAFRC5vW3bcWRCM2

ejzxSu7VxbafGrwgAoFkzdY+HAgSognt0KkjKFp7TZ
NUB4JA9eiJ7xL5bzFDDmzJxGiVcaI41X1OUfy5Hq1D+ZbCjJAJDtELhLT3xLagVZaG7vkXdNJ02oPRxZ

9kcJwfGjLDgAhdWECunbx5ek1+zN2RgjIB7Vig93N3w8fv5gVLaL4JotN+nlDx4CXykftJbYKgi8z1Mi

r9xvO4TYFU484bMHCbxu2TAZvv9GIkL7b32ZoP5aVn
Z2385pjarRJ4JrgHK7CSy7K9VAb45prAwH1u0pQR+yui2fgV4FI528cNh1pl/bjMDyhzucCFe6ESf6VY

wjuWaLrKckCgDZUb308JjLKAJexD1e6L3oKKIH97fWzQu2ovCJDcP1jM/TCIWmidmCUil7IkpyDEObLy

JGfo4WgWPTAhME6Wqi+J9ZIHjbc1V/3jiDq18x44g2
rUMO4nBMKwu9BW1dcgIAp2+5PYRgnMpkUHyeJn11HDyrbxtkD6J+FUTh/d1G1T9JoPiYIMKwZ1TCruxJ

GqkbRaS0zjmfxPoP+M32ErUCynIQGTFxuLreD6pdJE9JrXI/4FCem1O/igI8O9XGXQrd0Kla5+IyAPzR

kop0Cb8sy+9HRfo6fDYygaHLh+FfHppO9+z4t/SYSn
hQ+b5c6z5vhy9mTbvL+XrQm4bdQV+J9HCEYOJGI30/iHwEZFCr35D06Axhq52Pv+7Lb+8YkYvdmgAfw7

cq40ST/mwYeNuhhxpIq518nm44V5CZXmnABOrUrE1KL/QJEVQcXNgGeJWS0DsW/2nTMv8JcTR2b4aKv+

fzvRD2L7q+ajOkU6UB2ZZldjw2lPLXnfHYxcJbGwFc
zV1OZspBnrDinlp4bzM4FkF+eOREwI42+xrB1Lzh3Ma+k8LmyLus0vZmCIFo9HMsWHKtBeuSJj4shOoo

kuLnp7eQwNgvQliS8DHuBcVIpYj3NmDBUtoAx2+wKxQK39FkBw0Qcocr3l49fVWBiuTjS//Md//Md//M

d//P+WrnBIliUIzUwmLiT4qhmVxCfUZfMmMDTqsW6h
Vx64e5akxFXBXv9Lq28rsnMa1wzAHUujGk50IFQ1bQp+VDnSND8vhCgub7odAXvqH16YusY/YB5+4Czx

vlc7Wy/it8mcJrVy/NXyoVrf44Afjx1yyyXBJKqIvefNauivbIc6LFsVmRkxoLjI9cErfM89nueSzCsj

v97Z04DScI9In6l/d0GU/CvBI1OfMm/n42Te4mJhRp
b6aXJKqsAded90CRFhPDHj05uqtAPf4n0rMjiT4Fld/NyZqfOXdtdKDH3HSRah7S9SnGdRI8c3936hhM

U/ztz6ick0hn+17r+7K5YetYFpvdqDnj23Cm2fj82lrP5jLoy4y6jifDmf0nheJ3iF0cK+Ab9TjnLp73

Kd0A10nQ3aiBidP7vo8gMnvvhC0u/ZL6SqGcnEnSAv
yUcJ5xvaaHjQ0bGMvqxGelKN50TOPBFblzMrJLZpRCgY6H5Nvq0j3IzlcBNRfvmqX2LRAx4mgqrqQ4Hn

QwQucp3tdzZCV8ATug7yxbHmwGhgP2obh9qPt0Vww+lpGdE1Jf3zpTz6Bc0IGpNlw6eWb6gDqK2wvD2o

Cz1FghbYNWpxNPlVYdxlldvtv4ZMWuMZQmBL+N76W6
lEPdCZofLK7Et0R/1MPNV/BFf6kWqGQgFXrcJ5xJO9ylT9B7673vRZGyvMwmkjr45qdenSbFBfHGC0cH

0QxbKpfVZw/5dLz3jDazosl7l4l4BfZA7vpH0WG9vvVsJuD5T7ufRa3YwnJ3N4qKQHgXp24POsulJksf

ZaPPgefDaJKAuKZDSbBYlZV8AvSN+enmT7ki01xm7k
21DoadaTdyI87Zk5TdH7x89VbPeek5pH9v2LOSY28GRvIIqSyqumxZdDDVAyIka7mfkasf8i90oSAmiA

fwgrPGj+3V8gpSDEdPyaxGqqPY5Vply6zGfvGeCy0hlHO+2u1t7tcimTKNKRr5vHV9kDhOQZsDQsbdo0

U7q/5Fb8WYrtirz0I0qegXgbuQtp0IIyLfxI917DYs
9PWz4mYNvvvkP98p/Smg9sN0zHEY4/gQ0OAfql6XnGBQcv0gf0ChgGQpdDxssxdT06h+23OUEurWkqwT

cjlGvCQq9QiPGA2zzYDZsuEae2SvhBntGBkZyqxwU8awGG7niCbqjXdzHNm1Qnj7g1zTM28MtWlU9W5X

ndVybSVubyC6qEkcAv0VE8CBdlBdZL4TvGTkCZCvRT
+73JOdNuvA2hfWj1XZ8G/2KZPPRBDI+3PfMIenpACK158gGu+qUG5wb0zZPdDrAMU7gPvQBEobOh+syB

JBYJG06jADVxNCLbp2GelWdvLdW+yzhA1M7om0cgn+e7yCaRWOJEhLNN+mgmVdTJRECkUftBYHsCe/Dd

5pblqqL7BRt5D76xmN6YrXhC3IYe7/zG9XbSRPXMXx
8lVpV/QDsuUxSiKstexlqCug3tpDcQuUMc7KId6vHpujgXhmXR8sUG5hWuLd1EUEETP7VbXuBTEWj/5+

rxxP1wO/Q70mc1Bc3O4RzG6iBYfdZGc0WhvOxMzL7O2Qbw12uAqc05RvFJDd/f8UP+fwj7nzhFTtlKOh

ie2aHggZMVxwyyS2/Z1GuISnKMrOHvzqIC+Qd3O74T
VJxNwl/5D/jQSRh6VzV0nBxDs904drWHs26STCmiNdEpBY8UPz92dEe0X+U1djB4fBM+lOLRYxYRTeNn

x83Ofq+ybRE6u8h3/dkx19svgQiujEt85Iv7mUSjPkCZkL2ArMvATlG0/0Luu9eL2pkboMjnC7By6dq/

zyeHYfSwyG5etKZFEdh7L3rbA6FBY3GJ2tXuvXk6Qf
Nusi286wV0/P7GKTS9Scl6vqvhYqesfszx+9vkaWOwy+MiG53k8sOJTDF0PU5JvOiHR7JJjpG5BxcqR4

EzXm9rdDTu4yHX5alyQDfpFWp0y/9vfWjlAH8fgqK0x9ezW+O5oNX9UxQE3CYqmVGGLMvXvO5bDlC5rT

zSk0VNSdMUFnTdsqmIQ4v9Tzu0yFsz1Io1E9wcbd7+
QWMt08JKBOOHBpnBHjR+OsQ2Jy62N6f7eiI5xtYwf/yKkQOcAhXY6sOFwG22XB2cdtu6EP1KujX+K4Ly

WqZjkSKdH1MiRqf4wrrKveLOTL9P04tCu0M72k7xlcvvsOTHMT2QzHtGe2mLjphRAfmq9+cherY9tKYa

ouTFLs6DzT2fNgLQPmlVxcw7r2Dd3b0Na6joC4N+KH
nmPV4Nc5GCAIRlI6MSNWjLMCa1wPTkd1mkgCmMlMFcurSR+qedUAWNTkmsTq+tDVhKxON8b4cxYP5z/f

XcKpuQ9s0yBfN0POUSjJtgV2cCLWAY09GxwKIXfxW65f6LbWLotlqaKB28N1arCXmK2WCaul200A/SdA

+Kfy+UtmS4D2G2RLxj57KKxwsT0UlsnvU2PbLbeHlw
6/QcvtF9pSWRp3prTQNlyw9GzJiPwqusI0z6JwANv1+rvFWzto2x/TCzkwBE6KUvDWBHZ4gFO0/2TlJB

XnXuB1Yz0bcgO3uBBlDtam8LukSa/hinndzI5b8yCbYeHsJjDAT9qIswxDf/VZFRCqgLP+t8AFoRnRFT

7zCxzwXCt0tms2DwufJ/3wBiHRduyYYMPufBi5Xffh
Lneeyi36eKPt+B3HFg44MF2iOJ782vl6xyaYvPBVVkN3i2hWMYOAChhcaKFClg1avtfT9TdSAuDz9EJm

GoVlt+3im8UuS5rGLvbz1NJ/+bAXfFputY5jWWsSgTjIITmbnilBnb/WNdi1r5J3a/J4ftZ6Pp/fzD9n

3c9DHsLH0ZPHgHMPK8Db2K3By01KxB7fH+NNpVGQvz
Zoz3E++rlx5rVynnoqxOYofi1fKBpdIrpE0RKeouJRV0XRu6bsYnn9F/rViCLhtX/sfwiO2hc7zyn1tf

Vl+tGahz9JZ9OrMg2crhOkPtrM79bKS5aVsUPsA+6A/dIPTHH0xzJuLoB88sDDUy7bVRqIwSxzgsbfhm

/w7zSGnfilizTpFsNvOoIVQxcsUT4lSU+D0kJGQfUE
uuXmNcPekIxuCj5H514pJJqKaUqieK2nj8sd4RejyiRStM54c5IzF9aT0KEavb4QgVGbDToJI68BXeo5

AlkJoSCnpJse0lTbw5/WItSjrn/hWTrYF2nfnsimkbB79mynm6r6PKkiMzIc6++L0rlkFoYw43RgM5Ou

kFG+9n/WyFK07z4vEanD55kId81C/xYEOqOK5LE7lM
n+jxASrM6l2OXv47Tw599d0jC0Gn+OqBJsGK4onQUyqr5FabCIq/scj1vuPzzUx/yW0Z7KWZNzvi5opN

pk1E3x/qGZo6FAvO7srWZQMFAhOZnMj2uxZRDQ/lhevcvBZwNxv2fXUI/ODuzGw2WdNPB9Y9clJSOvw0

93xle8buRGDZDsVq0SXbmcOBLE7qXL2uc5ryhzGJYj
88oAJLMTeKyxI5vptNrd42urafrnlwLznsgbYM73MsLuaTjY/LRlCzyhPlooIYJiMH5ZTBrDycSsTQ2t

WEHdWO9iEUCJHZzXlM5YRGzuETTXcPsoFZFe7weDuvKf5UcV8FTOIgx7si8ecBtL83BT9CMVgPe0ld0R

GJ0IkHCqZ3S50GQcVqtGLmnH7sy0Kr2/22czXDPXqQ
Z2sVgyC6eM1zHE1UKellPzq8S5J3HBui+H9RewiVbI0DQo4eZrKMfvapIguMLRui9V3+UEpaj2MoQzeq

8mzfSh3zSkQyO4HM5wUl++a0n4uZD+7ynBzim+EfM6TSNE9L2RmbVkI0dJJChsdoQkoWTZw1gVp0RUJb

b/bWAqFniOvag8xOPld1fAWFEuEXI5BHXR0FWjDRYt
Y7H6sBGlgxuKWswRkgD/Hke/rvrX8kcZI0qOEa0+w32y8F6WcNvdot35epuGl/BSdg1j4lb48Fl6rfho

+td/U33e1gRVEaZ2Olm7V5lr6xYFjW0iq8opFVqP4jUB8fpwaaF0GjsXSAkwjmeFrI11mojUpC6/Vsxy

xJBRnQhcUCMSWRanIdkVTdizGmuR03SNZnTcMs3cQk
Chf95fevQQ1VSo4vnRlL1K6oRH1P0mP75ZL0ojlj5fkVQWQNDJIfiP8Ur0vCWMNAWKozuIWc2pii3Ub9

+vtCwqi/Y0bAvJkykmKp+8FaEhj0y6SYF+/YNtqSeFkHxZanyohWN/72nLVVP+Ob81vaMAZ+nnKbXVf0

ELPppdbN2it5KU4eeTmdsCAHvOb+ivyaxvVZcFu996
I7mDiIEdIDLikrRWviotWC7twmeZHcAYsrxbHfLGu2IIU5KlCiFq70pFemEWF1QDviIEiqpuTmpIMEyU

rflTu145yl3sSj3FXPDU7AePo6S5fuI0+le6dGNn4T9Qo0+0cahsKgGc8hCVhK6FG8OUowSlx2y1KtxG

thaXDMJZJNjHdAFMjbiAgDmAozXsph8lXdxpdCI+Ti
QJ2/pSLDGyXoOZG68FfmGuKpxSvc+FmeytDMtgHacJdqn07ed42Ab7kHb0CVxWUp6254xYw7S3Yv2mhB

NNxMWCaTQdPArdaFDdbfDg1Q9W6tl30FoIPnW007/f0LoeX57ImTXVNbGi7ApBm0/hMMOdfLl/Yq47BC

/zEWQpekvbejcjFal3YmR6sj2eAvzkkCwa2zspt0Xm
6NhcSjk0sjScfd57i4amvuIY5+tuYDe41B8YFcmX328ZC1/SJIo2uWSd2nNsAKzvWpEuoFkzkpGYBSh9

ihl/Bu76uSGaSF5DJZgfSkUfHsICr23pLnNNqaa/emh7AIWJ/M8//pH0D01IL6jTSdm1C+ZD3LQOLqpX

asCFaINQ9Vg/TRh8/t4TzKiyKQEa3Vl9rDr0v9YLzA
FImtkpsARIPKUPzGgOO0UdlvCXRAYGE6iU0E+ZZDuePWYdj+vTCdHg4mM+YjFieM+aKojplRhaKby0FX

AvxHTe+nDOH+B9dxG6U1UK0H5tDd2u/rfhzjQNPiyco4j8LzNbEAWK560X3x1xSJ0FWhVseKUBQsX8/+

FspFdg2SOj1fr6hVO3/IkE3jhfrXuux2wdK0dChK5W
/oWvL1Gq7qjLdbPrW5kr5ro7/BphoTj2z9KOSJ3DUMUscNk6a9c1gkFQkf+HewYgPVPwrQtQgFXVdmPr

QQ0gX04kAq1VRpmJAwONWGuCfphYkU0krZiZdc3XzTXxTgXrD3pQZxA1ZwGax3n0zUFI+YIMiXMaiLwd

GZytGY2CniPR+7K6vOR3dkscGarKjvrDOV8z0TZ78p
Ey/6BqV/8sHK4YYfA7z+0Ajj36vsOEJ48M3N1lY+g57Tg3yo7LpicdaxQkr/xVbQ3LO+qYIOlrw1zw7u

QEFRQrtFWy4i2FkpBjyXfVYw0Ci2K/OZ0c0w/zMkV0n3wrYkGk825vbhxkwIsbElNQz9pv+VuE11keTF

OE0fZvuZUiwtt1B8PkM9YjB8ijf26ebaWMbfrntmH3
oF7c+Jg3tt/dAhaYWQVLWrPD/JsBlSw/is3kP/HFTY5wM0O7wxPe0umbr13/Zve7daEXNUNpsafX87u8

KbVHAsSjB0X8zXQCIMghGWIazbMXxd6xLDyZGpfFZYHha2gDBpFq7Th5LGeGEpHgw3XAPj4jvljxN8nO

/hInJvKb3uWit/vAsHZ2u9U7X+GbEqg5pKUi0ARlYE
puPHsgR/SzynIIbPceBcuRDC5noa7oyx25Z7335891HdYLs8xgFiZ+pGLChE4iK2riNrk5XWYT16XDCX

FHte8Jj6GCAHzfa9QG7km/ofJMjUTXDeKuelL3w5LNMAscMOo5aiHbL3ru2dsFmvICQ6PGCoBeWjHl99

qwQSgBQuXZm6jpTTgxnXTgl3RbcoXJnaRGlYYWMS/G
HfKcumr0+xEf8nqbPP4XHImiDv83kaS+RwWDXiyKZhm7Dvq+06osLtLtshpzC+xfoJskE/00bYjnEu7U

m2ZCux0NPybgT67aD72lu3fNLWIrxUgUhALXczvSbojEtPcZmWueSy8u1tY3YjOK5cn/XR/0/+2rexee

Mn3PAaaBQ9+AiGhI52PBJtQshDJbdFokKXze3xjBsY
CRIhTSxVrzxIQX4vOR0+GLX3nxMtchI6J4g0pql1WB9oTeyJ0HLDYJAbLRdGaK6917NuADwhEJ9EGmGW

vFimzSxqaX1690wU2ZT9yVi7N2gSKrBEdKT7260PH+srPmXXAVeITIUQy5rhT6sO+dJcWD3l9akZHB29

MgX2Nb8irtyhYrXJKCY7CawsEwYvuGpoWAB2tMpDiO
tpLxesm6CnFga/cqEi16kN8fEru+hBdriA9miRocJ0oDsl49TcWXeu7RWlSOmUF8d9cnsCaLGJ55PTxR

h/fJHqqfk1HBBugKGRJD1TEgIHLOKMe/bjhzcfuNC+alQdCLgoVXEu2dSaVAIsW+LkPTHCwJ/BRKWb4S

e4SIBI18+JU+FPtxSVsOljKVr9Cy5tB0l2uSkduOIW
CgWRmXzUFDu9z9D/47QVI6CBvPWD2tpJp8MqDxX5i5+BTRqEfbX904O4RxvX/gi6xyMvbw9JFSxHiJ2y

IJuJc80arh4lRBoMtjlQ8qIe+Xd2U66uJ9HAREub88EFLREkbIK9eUZkXzBHl1WhLoRFZHnxJxoJ8bqb

pQG9NJhdMD1AQ2eVrDfc6THPMQrf37vEZmOC7SAnG4
95Dbq2KPJYQOlXWYaQjh+ld7nFBTq1s8aOaBtF2hD/DrEuwJmQ/Wc442QXh9jCv9pyCKQyHAFUhUQrMf

RrhXpkpA3aDHBZk+9MfOG8+nF40vSB4QFpPEyhwtYd0hhvNtIFeiWzQQLY5K37ckmXQP1MIoOH/Z48an

69EAnjf92dXRZKAY1nFxy7jtPRVFOaB0D+Lsy/Qcmp
PNAi/R6cu1es2J5lcxDwvjAkMEigRKoGpUE8aoq4c89cRtI866vhwRBO3Q6EAQzckFxCQL4c2g+uFQU3

cXgnciBzp92i3JJ/MxaGCT7vjrEFNxoW/gcqW3HxxKy6iLYTzvtfjHj9cG/HlJZr2olfAIaervUSc1Nl

e707iJ87xz+ZfJ/hDDJnMl+PU+39CDbqVBKUj++/f8
f2zbCHandudKt1wCWKAVlgfGjyFBLqNU3B7V01d9yMNvlJhaRfY53Cy8unZLeQadMobaNeqcBLmQ+2gz

EelRUzrDZ9S/crM5ne6cNW3XVJiiojMURoTx0Cdy4FXyi57ZgrSBqJb6NBSW0dbshMLCvA9qUhe1olDR

XXkfTM3Lbmz0rYCvaBGBOtpv5k1VVG1vCG9urEOZrU
1Q7fq9/OsHCGukqU9+11xSztZYoRHMLKGYpbJIjHmoTCsxHi7IrfNGOHvv9wGx67FQEy4mKf4Nwu23Ja

tjWDPFtz8B1cEsRwYSdyUkM2UAPlaT61jC5TjV3F8KbKL1zTe2a69X+3K+zvpAn+0uai0nKsbDr1HAfM

8mPWE0VB5fqTnv7XHs+Op6xGfMNqVL5v8h7ZWsdM57
8G4CGz/zpdY/QkukdqK6HbwrvENWExg0NwSpvWGccWNAt019qaifzCKsm658zgJ48xnsk5ACg6wca9zq

hI5FCKUE0XIT4pfcsfExw7+twpIhm4vZSqBFLnpXMky+fA/mjk98eCkjzMN6lDAOC8+yJa9QOzdKqE9r

eOESrRBhb533I8hxblmTUEW6u5ykS9ozNT7fH4w6/G
Ay3KaGodIYV/Hmp/dtgCDj33sXBiY+1T9VXDcAlEgCCDTCf8KYB06Ub/zNeAh0sYQKDJJUWczLPy72tw

vzWJsY7gms4Uzxu4NiBllAoCWaTVhFVNF3RR/QgNVea7eyjO3Y47D7YJNG3YE1MU3/bReXYKlVT2X9Xd

UQfTv/nxNINmWpFzvpaLDUA0nslPaGaIFYBW75MWQB
bbzoiju/wj8emwG+HIBkLbItbWqEZlDLynKk4HDA9cumSbhalRSXytbSuIyBHT9bqhnuwZPw/xk6Dk7Q

sNlhg0eTeGk2+c4+ixRgVbZZB5aj8080G7vpxJyhbE/RxfnvrFZ5lksUiPeR6OQ2ts0e4vhZ0Sw4O4Dw

PBn2oklfcdV5KmmOcYowGkJLGG2oEyU5ssytcxl9aY
+Ec4Xc0zjW99idrm0ghNaOQf8OhrpzXeY7O0jIXU0dbkvQK0zTXFY10cMNzrs4pHHeBrM3GEeA66GK6D

TjW9MJ4kfp7EbjQIAmFfgIqgr9IbwR5IIwLufcL3KcTmR89IOA7OjBF6dsBW8UVMNQfUA4m60MWGOZFB

hI6LgT5sIcsxiJzrbEaIhg78fY4Vq42A8WhnMLOtmU
Eg7a8UQNjVxdy0EKGHHPURewwqZr79Qi4FXqfD79ZR7r4DQGjjcLcM8MEVHkb7nPyZ14QOXs5+Q3mi+e

L4l602ulwqAwPq7Y87dqUTqvIG6O7CiwQuf6IScfPGXcXVkJfxe39G3k6Kkn4KNIQNQ+ipGYGLBMlgUW

35xbx0idLDiqrHPKFzBisFy4QqUipaqouVEQqA8TKY
hBeaAjm6kso4kSo1yhzVFB1p/PI3sqvMXoiv8BuYyLWOpY3QQjKsjAqd/bFmu0I3/KmEOhWhpmR4klh8

wi463kFYurz+bKkK64gDW5Qji3qupFYIDW2cHcaz9OQWt48r/PtF0VmmFPTAXZnlal524eShzfXlmGv6

cgM60f20HNiBCAXmkfoIYllqLbbbkmR7eO5zicI2zu
ZJdA5tl9gyKHHoepkwS9Cto94yzl1NQEx8HyBFbhLwLl3ZM065LD4FOyTMCv14VJhLy9Xix8vDxZYaqw

sbYLCbNshoJUtfdex65MHBCE0BO2kAk7RWuo1ZvgCCqK5JfUB7Xt2N2PrsWZiZbMrPC1T1SldhiNT7dQ

g/5f/1I/yP//g/UUzx4n9IReutGz5HXF3hf7AtSNFv
KDVyZJ8kyg2pArJbZW7mrq24YM9LSN6clElcHgE+LlG4uxAkdN0ZbQg9MKZcTMHnRUq5BoXuFIAgY07I

E1ewTtEjEY9RAT9CVU5ls8LmPAGnQEFkQX7npc9qCcQpDZ4nni54KX7VrFs5ALOrW2ZdPDDkBZAbf4Xl

E1sskex2ySG3ZX4OWFFtKLB9CrT2Z9RxDZA1PZVpUi
IIOsPBIVt4QKOYLiSNXsqDXjO2IPOrDeTXVFKUCKpCErLEMzCQWITBGSVPS6GCN4NWU8G+SS5Es294JZ

GlKOMBU1kvAk8lRvMlAHCuJ7i6ETClZV9ZdGCqQZ5KIuGbD7wgJrZxKGThEL0+c6RkOMJfEPx96eVtXM

NoRYFJvovf9cAQafZTxXJl9hVZUIuuEADuzDZOBrGQ
+fMLiPxVCCYPAUneP+aoejiIDXTBXzbHVvWeJHJ2flAxeM9LCZ5ge3UcQT5St3KmyqR3wcNgSXp7UVV8

EeGMAmKhDL0W









                    ID                  Date                Data Source

 

                    [id number]          2020 04:18:00 PM EDT [facility] ([facility])









          Name      Value     Range     Interpretation Code Description Data Felicita

rce(s) Supporting 

Document(s)

 

           Laboratory test finding (navigational concept) 43.0 %     38.0-51.0  

                      [facility] 

([facility])                   

 

           Laboratory test finding (navigational concept) 96 mg/dL        

                      [facility] 

([facility])                   

 

           Laboratory test finding (navigational concept) 4.0 meq/L  3.5-5.1    

                      [facility] 

([facility])                   

 

           Laboratory test finding (navigational concept) 140 meq/L  136-145    

                      [facility] 

([facility])                   

 

           Laboratory test finding (navigational concept) 101 meq/L       

                      [facility] 

([facility])                   

 

           Laboratory test finding (navigational concept) 4.6 mg/dL  4.5-5.3    

                      [facility] 

([facility])                   

 

           Laboratory test finding (navigational concept) 14 mg/dL   8-26       

                      [facility] 

([facility])                   

 

           Laboratory test finding (navigational concept) 0.7 mg/dL  0.6-1.3    

                      [facility] 

([facility])                   

 

           Laboratory test finding (navigational concept) 28.0 MM/L  23.0-27.0  

                      [facility] 

([facility])                   







                                        Procedure

 

                                          



                                                                                
                                                                                
        



Vital Signs

          



                    ID                  Date                Data Source

 

                    UNK                                      









           Name       Value      Range      Interpretation Code Description Data

 Source(s)

 

           Body mass index (BMI) [Ratio] 45.2 kg/m2                       45.2 k

g/m2 [facility] ([facility])

 

           Oxygen saturation in Arterial blood by Pulse oximetry 99 %           

                  99 %       [facility] 

([facility])

 

           Body weight 304.12 [lb_av]                       304.12 [facility]_av] [facility]

 

           Body height 68.75 [in_i]                       68.75 [in_i] [facility] ([facility])

 

                                        5'8.75" 

 

           Heart rate 89 /min                          89 /min    [facility] ([facility])

 

           Diastolic blood pressure 82 mm[Hg]                        82 mm[Hg]  

[facility] ([facility])

 

           Systolic blood pressure 126 mm[Hg]                       126 mm[Hg] [facility]

 

           Body weight 135.173 kg                       135.173 kg [facility] ([facility])

 

           Body mass index (BMI) [Ratio] 45.3 kg/m2                       45.3 k

g/m2 [facility] ([facility])

 

           Body weight 298.00 [lb_av]                       298.00 [lb_av] [facility]

 ([facility])

 

           Body height 68 [in_i]                        68 [in_i]  [facility] ([facility], )

 

                                        5'8" 

 

           Body temperature 96.7 [degF]                       96.7 [degF] [facility]

 ([facility],

 )

 

           Diastolic blood pressure 96 mm[Hg]                        96 mm[Hg]  

[facility] ([facility], )

 

           Systolic blood pressure 154 mm[Hg]                       154 mm[Hg] [facility] ([facility])

 

           Body mass index (BMI) [Ratio] 45.1 kg/m2                       45.1 k

g/m2 [facility]

 

           Oxygen saturation in Arterial blood by Pulse oximetry 97 %           

                  97 %       [facility] 

([facility])

 

                                        [employer] [facility] 

 

           Body weight 303.00 [lb_av]                       303.00 [lb_av] [facility]

T ([facility])

 

           Body height 68.75 [in_i]                       68.75 [in_i] [facility] ([facility]

 

                                        5'8.75" 

 

           Heart rate 83 /min                          83 /min    [facility]

 

           Diastolic blood pressure 80 mm[Hg]                        80 mm[Hg]  

[facility] ([facility])

 

           Systolic blood pressure 122 mm[Hg]                       122 mm[Hg] [facility] ([facility])



                                                                                
                  



Patient Treatment Plan of Care

          



             Planned Activity Planned Date Details      Description  Data Source

(s)

 

             montelukast 10 MG Oral Tablet 08/15/2019 09:06:21 AM YESSENIA GLASGOW ([facility])

## 2021-04-09 ENCOUNTER — HOSPITAL ENCOUNTER (OUTPATIENT)
Dept: HOSPITAL 53 - M LABSMTC | Age: 31
End: 2021-04-09
Attending: PEDIATRICS
Payer: SELF-PAY

## 2021-04-09 DIAGNOSIS — Z11.52: Primary | ICD-10-CM

## 2022-07-20 ENCOUNTER — HOSPITAL ENCOUNTER (OUTPATIENT)
Dept: HOSPITAL 53 - M LAB REF | Age: 32
End: 2022-07-20
Attending: PHYSICIAN ASSISTANT
Payer: COMMERCIAL

## 2022-07-20 DIAGNOSIS — R05.9: Primary | ICD-10-CM

## 2022-07-20 DIAGNOSIS — R50.9: ICD-10-CM

## 2024-07-17 ENCOUNTER — HOSPITAL ENCOUNTER (OUTPATIENT)
Dept: HOSPITAL 53 - M SFHCDERM | Age: 34
End: 2024-07-17
Attending: PHYSICIAN ASSISTANT
Payer: COMMERCIAL

## 2024-07-17 DIAGNOSIS — D22.4: Primary | ICD-10-CM

## 2024-09-12 ENCOUNTER — HOSPITAL ENCOUNTER (OUTPATIENT)
Dept: HOSPITAL 53 - M SFHCDERM | Age: 34
End: 2024-09-12
Attending: PHYSICIAN ASSISTANT
Payer: COMMERCIAL

## 2024-09-12 DIAGNOSIS — D48.9: Primary | ICD-10-CM
